# Patient Record
Sex: FEMALE | Race: WHITE | NOT HISPANIC OR LATINO | Employment: OTHER | URBAN - METROPOLITAN AREA
[De-identification: names, ages, dates, MRNs, and addresses within clinical notes are randomized per-mention and may not be internally consistent; named-entity substitution may affect disease eponyms.]

---

## 2017-01-02 ENCOUNTER — APPOINTMENT (OUTPATIENT)
Dept: ULTRASOUND IMAGING | Facility: CLINIC | Age: 52
DRG: 407 | End: 2017-01-02
Attending: TRANSPLANT SURGERY
Payer: COMMERCIAL

## 2017-01-02 ENCOUNTER — APPOINTMENT (OUTPATIENT)
Dept: GENERAL RADIOLOGY | Facility: CLINIC | Age: 52
DRG: 407 | End: 2017-01-02
Attending: TRANSPLANT SURGERY
Payer: COMMERCIAL

## 2017-01-02 ENCOUNTER — APPOINTMENT (OUTPATIENT)
Dept: PHYSICAL THERAPY | Facility: CLINIC | Age: 52
DRG: 407 | End: 2017-01-02
Attending: SURGERY
Payer: COMMERCIAL

## 2017-01-02 PROCEDURE — 40000940 XR ABDOMEN 1 VW

## 2017-01-02 PROCEDURE — 76700 US EXAM ABDOM COMPLETE: CPT

## 2017-01-03 ENCOUNTER — APPOINTMENT (OUTPATIENT)
Dept: PHYSICAL THERAPY | Facility: CLINIC | Age: 52
DRG: 407 | End: 2017-01-03
Attending: SURGERY
Payer: COMMERCIAL

## 2017-01-05 ENCOUNTER — APPOINTMENT (OUTPATIENT)
Dept: PHYSICAL THERAPY | Facility: CLINIC | Age: 52
DRG: 407 | End: 2017-01-05
Attending: SURGERY
Payer: COMMERCIAL

## 2017-01-06 ENCOUNTER — TELEPHONE (OUTPATIENT)
Dept: TRANSPLANT | Facility: CLINIC | Age: 52
End: 2017-01-06

## 2017-01-06 NOTE — TELEPHONE ENCOUNTER
Went to visit Dinora on 7A to review discharge teaching and planning for when she is discharged from acute care. Dinora was c/o feeling groggy and not up to teaching at this time. She requested to wait until her  was present. Will wait for their call when Dinora is feeling better.

## 2017-01-09 ENCOUNTER — APPOINTMENT (OUTPATIENT)
Dept: LAB | Facility: CLINIC | Age: 52
DRG: 407 | End: 2017-01-09
Attending: INTERNAL MEDICINE
Payer: COMMERCIAL

## 2017-01-09 ENCOUNTER — TELEPHONE (OUTPATIENT)
Dept: PHARMACY | Facility: CLINIC | Age: 52
End: 2017-01-09

## 2017-01-09 ENCOUNTER — CARE COORDINATION (OUTPATIENT)
Dept: CARE COORDINATION | Facility: CLINIC | Age: 52
End: 2017-01-09

## 2017-01-09 ENCOUNTER — TELEPHONE (OUTPATIENT)
Dept: TRANSPLANT | Facility: CLINIC | Age: 52
End: 2017-01-09

## 2017-01-09 ENCOUNTER — INFUSION THERAPY VISIT (OUTPATIENT)
Dept: INFUSION THERAPY | Facility: CLINIC | Age: 52
DRG: 407 | End: 2017-01-09
Attending: SURGERY
Payer: COMMERCIAL

## 2017-01-09 VITALS
OXYGEN SATURATION: 100 % | WEIGHT: 143.6 LBS | BODY MASS INDEX: 21.84 KG/M2 | TEMPERATURE: 96.7 F | SYSTOLIC BLOOD PRESSURE: 114 MMHG | DIASTOLIC BLOOD PRESSURE: 63 MMHG

## 2017-01-09 DIAGNOSIS — Z90.410 POST-PANCREATECTOMY DIABETES (H): Primary | ICD-10-CM

## 2017-01-09 DIAGNOSIS — E13.9 POST-PANCREATECTOMY DIABETES (H): Primary | ICD-10-CM

## 2017-01-09 DIAGNOSIS — Z90.410 ACQUIRED TOTAL ABSENCE OF PANCREAS: Primary | ICD-10-CM

## 2017-01-09 DIAGNOSIS — K86.89 PANCREATIC INSUFFICIENCY: ICD-10-CM

## 2017-01-09 DIAGNOSIS — Z90.410 HISTORY OF PANCREATECTOMY: ICD-10-CM

## 2017-01-09 DIAGNOSIS — Z90.81 ACQUIRED ASPLENIA: ICD-10-CM

## 2017-01-09 DIAGNOSIS — E89.1 POST-PANCREATECTOMY DIABETES (H): Primary | ICD-10-CM

## 2017-01-09 DIAGNOSIS — E89.1 POST-PANCREATECTOMY DIABETES (H): ICD-10-CM

## 2017-01-09 DIAGNOSIS — Z48.298 AFTERCARE FOLLOWING ORGAN TRANSPLANT: Primary | ICD-10-CM

## 2017-01-09 DIAGNOSIS — R79.89 ELEVATED PLATELET COUNT: Primary | ICD-10-CM

## 2017-01-09 DIAGNOSIS — E13.9 POST-PANCREATECTOMY DIABETES (H): ICD-10-CM

## 2017-01-09 DIAGNOSIS — Z90.410 POST-PANCREATECTOMY DIABETES (H): ICD-10-CM

## 2017-01-09 LAB
AMYLASE SERPL-CCNC: 28 U/L (ref 30–110)
ANION GAP SERPL CALCULATED.3IONS-SCNC: 7 MMOL/L (ref 3–14)
BASOPHILS # BLD AUTO: 0.1 10E9/L (ref 0–0.2)
BASOPHILS NFR BLD AUTO: 0.9 %
BUN SERPL-MCNC: 26 MG/DL (ref 7–30)
CALCIUM SERPL-MCNC: 9.2 MG/DL (ref 8.5–10.1)
CHLORIDE SERPL-SCNC: 97 MMOL/L (ref 94–109)
CO2 SERPL-SCNC: 33 MMOL/L (ref 20–32)
CREAT SERPL-MCNC: 0.48 MG/DL (ref 0.52–1.04)
DIFFERENTIAL METHOD BLD: ABNORMAL
EOSINOPHIL # BLD AUTO: 0.9 10E9/L (ref 0–0.7)
EOSINOPHIL NFR BLD AUTO: 6.3 %
ERYTHROCYTE [DISTWIDTH] IN BLOOD BY AUTOMATED COUNT: 13.7 % (ref 10–15)
GFR SERPL CREATININE-BSD FRML MDRD: ABNORMAL ML/MIN/1.7M2
GLUCOSE BLDC GLUCOMTR-MCNC: 110 MG/DL (ref 70–99)
GLUCOSE SERPL-MCNC: 144 MG/DL (ref 70–99)
HCT VFR BLD AUTO: 35.8 % (ref 35–47)
HGB BLD-MCNC: 11.3 G/DL (ref 11.7–15.7)
IMM GRANULOCYTES # BLD: 0.1 10E9/L (ref 0–0.4)
IMM GRANULOCYTES NFR BLD: 0.5 %
LIPASE SERPL-CCNC: 36 U/L (ref 73–393)
LYMPHOCYTES # BLD AUTO: 1.9 10E9/L (ref 0.8–5.3)
LYMPHOCYTES NFR BLD AUTO: 13.3 %
MAGNESIUM SERPL-MCNC: 2.8 MG/DL (ref 1.6–2.3)
MCH RBC QN AUTO: 28.9 PG (ref 26.5–33)
MCHC RBC AUTO-ENTMCNC: 31.6 G/DL (ref 31.5–36.5)
MCV RBC AUTO: 92 FL (ref 78–100)
MONOCYTES # BLD AUTO: 1.3 10E9/L (ref 0–1.3)
MONOCYTES NFR BLD AUTO: 8.7 %
NEUTROPHILS # BLD AUTO: 10.1 10E9/L (ref 1.6–8.3)
NEUTROPHILS NFR BLD AUTO: 70.3 %
NRBC # BLD AUTO: 0.1 10*3/UL
NRBC BLD AUTO-RTO: 0 /100
PHOSPHATE SERPL-MCNC: 4.6 MG/DL (ref 2.5–4.5)
PLATELET # BLD AUTO: 1420 10E9/L (ref 150–450)
POTASSIUM SERPL-SCNC: 4.3 MMOL/L (ref 3.4–5.3)
RBC # BLD AUTO: 3.91 10E12/L (ref 3.8–5.2)
SODIUM SERPL-SCNC: 137 MMOL/L (ref 133–144)
WBC # BLD AUTO: 14.3 10E9/L (ref 4–11)

## 2017-01-09 PROCEDURE — 80048 BASIC METABOLIC PNL TOTAL CA: CPT | Performed by: SURGERY

## 2017-01-09 PROCEDURE — 83690 ASSAY OF LIPASE: CPT | Performed by: SURGERY

## 2017-01-09 PROCEDURE — 00000146 ZZHCL STATISTIC GLUCOSE BY METER IP

## 2017-01-09 PROCEDURE — 82150 ASSAY OF AMYLASE: CPT | Performed by: SURGERY

## 2017-01-09 PROCEDURE — 96360 HYDRATION IV INFUSION INIT: CPT | Mod: ZF

## 2017-01-09 PROCEDURE — 99215 OFFICE O/P EST HI 40 MIN: CPT | Mod: 25,ZF

## 2017-01-09 PROCEDURE — 85025 COMPLETE CBC W/AUTO DIFF WBC: CPT | Performed by: SURGERY

## 2017-01-09 PROCEDURE — 96361 HYDRATE IV INFUSION ADD-ON: CPT | Mod: ZF

## 2017-01-09 PROCEDURE — 25000125 ZZHC RX 250: Mod: ZF | Performed by: SURGERY

## 2017-01-09 PROCEDURE — 84100 ASSAY OF PHOSPHORUS: CPT | Performed by: SURGERY

## 2017-01-09 PROCEDURE — 25800025 ZZH RX 258: Mod: ZF | Performed by: SURGERY

## 2017-01-09 PROCEDURE — 25000132 ZZH RX MED GY IP 250 OP 250 PS 637: Mod: ZF | Performed by: SURGERY

## 2017-01-09 PROCEDURE — 83735 ASSAY OF MAGNESIUM: CPT | Performed by: SURGERY

## 2017-01-09 RX ORDER — ASPIRIN 325 MG
325 TABLET ORAL ONCE
Status: COMPLETED | OUTPATIENT
Start: 2017-01-09 | End: 2017-01-09

## 2017-01-09 RX ORDER — HEPARIN SODIUM (PORCINE) LOCK FLUSH IV SOLN 100 UNIT/ML 100 UNIT/ML
5 SOLUTION INTRAVENOUS DAILY PRN
Status: DISCONTINUED | OUTPATIENT
Start: 2017-01-09 | End: 2017-01-09 | Stop reason: HOSPADM

## 2017-01-09 RX ORDER — ASPIRIN 325 MG
325 TABLET ORAL DAILY
Qty: 180 TABLET | Refills: 0 | Status: SHIPPED
Start: 2017-01-09 | End: 2017-01-31 | Stop reason: DRUGHIGH

## 2017-01-09 RX ADMIN — SODIUM CHLORIDE, POTASSIUM CHLORIDE, SODIUM LACTATE AND CALCIUM CHLORIDE 2000 ML: 600; 310; 30; 20 INJECTION, SOLUTION INTRAVENOUS at 12:39

## 2017-01-09 RX ADMIN — SODIUM CHLORIDE, PRESERVATIVE FREE 5 ML: 5 INJECTION INTRAVENOUS at 11:11

## 2017-01-09 RX ADMIN — ASPIRIN 325 MG ORAL TABLET 325 MG: 325 PILL ORAL at 12:10

## 2017-01-09 NOTE — Clinical Note
January 9, 2017      TO: Dinora Mcghee  [address] [facility] [id number]         Dear Ms. Dinora Mcghee,    It was a pleasure speaking with you recently regarding your genetic testing results. You were previously referred for genetic counseling and testing by Dr. Phoenix due to your history of pancreatitis.  We discussed that your genetic testing results were negative, meaning that no mutations or unknown variants were found in the genes examined.     As we reviewed, you had genetic testing for four genes associated with chronic pancreatitis or recurrent acute pancreatitis: the cationic trypsinogen gene (PRSS1), the pancreatic secretory trypsin inhibitor gene (SPINK1), chymotrypcin C (CTRC), and the gene for cystic fibrosis (CFTR). This test is expected to detect 99% of mutations in the PRSS1, SPINK1, and CTRC gene, and 97-98% in the CFTR gene. Since no mutations or variants were detected, this significantly reduces the liklihood that your symptoms are related to mutations in any of these genes. However, while these results reduce the chance that there is a genetic component, they do not eliminate the possibility entirely as we are still learning about the genetics of pancreatitis.  It would still be important for other relatives to be aware of the family history of pancreatitis. We may have better or additional genetic testing for pancreatitis in the future.     If you have any additional questions about these results, please don't hesitate to contact me at [phone]. Best wishes to you.     Sincerely,     April Clifford MS [facility]  Genetic Counselor  [facility]    CC: Alban BURLESON, Ty

## 2017-01-09 NOTE — PROGRESS NOTES
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      Discharge date: 1/8  Transplant coordinator: Mecca   Phone number patient can be reached at: [phone] (M)      Physical Assessment:  See physical assessment located under \"Document Flowsheets\".  Incision site: c/d/i steri-strips. No s/s infection.   Lines: G/J tube. G tube with ~650-700 mls out put since discharge from hospital.   Piedra: N/A  Urine clarity: clear/yellow, reports no issues with urination.   Hydration: 30 ml/hr free water increased to 40/ml per Dr. Phoenix.   Nutrition: TF 60 ml/hr   Last BM: Diarrhea, 3 x since yesterday. Bowel meds adjusted through coordinator, per orders from Alban.  Pain: Upper RQ abd pain, and incisional pain.      Laboratory tests: Standard labs drawn.    Plan of care for today: Labs drawn and pt assessed. Post education checklist reviewed with pt and family present; spouse, and mother. Coordinator Mecca at bedside and in communication with MD. Orders received for 2 L LR bolus. Pt medication card reviewed, specialty pharmacist present and assisting with extensive medication review. Per request, BG was checked with pt's meter and with SIPC meter; pt's meter resulting 93 and SIPC meter resulting 110. Diabetic educators unavailable. Aspirin administered per orders, script sent to pharmacy per Mecca. Port flushed with heparin and deaccessed, pt to follow up thurs with Dr. Phoenix.         Medications administered:  Administrations This Visit     aspirin tablet 325 mg     Admin Date Action Dose Route Administered By             01/09/2017 Given 325 mg Oral Paty Polanco, MONICA                    heparin 100 UNIT/ML injection 5 mL     Admin Date Action Dose Route Administered By             01/09/2017 Given 5 mL Intracatheter Sonja Mcgrath RN                    lactated ringers BOLUS 2,000 mL     Admin Date Action Dose Route Administered By             01/09/2017 New Bag 2000 mL " Intravenous Paty Polanco, MONICA                    sodium chloride (PF) 0.9% PF flush 20 mL     Admin Date Action Dose Route Administered By             01/09/2017 Given 20 mL Intracatheter Sonja Mcgrath RN                          Patient education:    The following teaching topics were addressed: Medications (purposes, doses and times of administration), Phone numbers to call with concenrs (Transplant coordinator, [facility] and [facility]) and Plan of care   Patient, Mother and spouse3. verbalized understanding and all questions answered.      Discharge Plan    Pt will follow up with appt thurs with Dr. Phoenix  Discharge instructions reviewed with patient: YES  Patient/Representative verbalized understanding, all questions answered: YES    Discharged from unit at 1500 with whom: family to home.    Paty Polanco RN

## 2017-01-09 NOTE — TELEPHONE ENCOUNTER
Called to notify patient of her pancreatitis genetic testing results, which were negative for mutations or variants in all four of the tested genes (CFTR, SPINK1, CTRC, PRSS1). No answer, so left message asking that she call me back.     April Clifford MS, [facility]  Genetic Counselor  [facility]

## 2017-01-09 NOTE — TELEPHONE ENCOUNTER
Received call back from patient and shared test results. Answered questions and she expressed understanding. Will send her a copy of the results and results letter for her records.    April Clifford MS, [facility]  Genetic Counselor  [facility]

## 2017-01-09 NOTE — TELEPHONE ENCOUNTER
"Reviewed labs for Dinora with Dr. Phoenix. Bicarb was 33. Dinora c/o 3 bouts of brown watery diarrhea. Dinora is c/o feeling \"wiped out\". She says she slept well. Decision was to discontinue the mineral oil, and MOM 30 mg, and senna/colace 1 time per day. Also to give her 2 liters of LR. Kye right chest port has been accessed so this was used for fluid therapy. Increase free water to 40 cc/hour along with her tube feeds at 60 cc/hour. There was 650-700 cc in the gastric bag.  Mom took the first night caring for Dinora. Mom (Tegan), is asking for a way to document who gave what medication at what time to avoid a potential missed dose or give too much medication. This RN provided several options and printed multiple medication cards. We came up with a solution that they will try. Mother was sharing that the Brand name is written in very small letters and is hard to see. [facility] Pharmacist, Raeann was in the room doing patient teaching and sorting out Dinora's medications.    Family verbalized understanding of medication teaching. Dinora has been concerned that her glucometer isn't working correctly and that she had many highs during the night. We did a comparison blood sugar check with Dinora's meter and the meter from [facility]. Dinora's meter 98, [facility] glucometer 110. Dr. Melissa notified of Dinora's blood sugars since she has been home. Dinora, Shaan, and mother-Tegan all verbalized understanding and felt comfortable with the plan and managing her care at home. No additional questions.  "

## 2017-01-09 NOTE — NURSING NOTE
Chief Complaint   Patient presents with     Port Draw     port accessed by RN, labs collected and sent, line flushed with NS and heparin and left accessed just in case. Only vitals done was pt weight.     Sonja Mcgrath

## 2017-01-09 NOTE — TELEPHONE ENCOUNTER
Dinora Mcghee is a 51 year old TP-IAT patient. This pharmacist saw patient prior to discharge to review medications, perform medication education and offer Specialty Pharmacy program.  Current Outpatient Prescriptions   Medication Sig Dispense Refill     aspirin 325 MG tablet Take 1 tablet (325 mg) by mouth daily Take 1 aspirin, 325 mg. Crush the aspirin and administer via jejunal tube. Flush with 30 cc of free water. 180 tablet 0     mineral oil oil 30 mLs by Per Feeding Tube route daily 420 mL 2     insulin aspart (NOVOLOG PEN) 100 UNIT/ML injection Inject 1-6 Units Subcutaneous every 4 hours 15 mL 1     insulin glargine (LANTUS) 100 UNIT/ML injection Inject 13 Units Subcutaneous every 24 hours 15 mL 1     HYDROmorphone (DILAUDID) 1 MG/ML LIQD liquid 3 mLs (3 mg) by Per Feeding Tube route every 3 hours 336 mL 0     LORazepam (ATIVAN) 2 MG/ML (HIGH CONC) solution Take 0.25 mLs (0.5 mg) by mouth every 6 hours as needed for anxiety, nausea or vomiting 14 mL 1     sodium bicarbonate 325 MG tablet 1 tablet (325 mg) by Per J Tube route every 4 hours 84 tablet 2     amylase-lipase-protease (CREON 24) 06826 UNITS CPEP per EC capsule 1-2 capsules (24,000-48,000 Units) by Per J Tube route every 4 hours 168 capsule 2     pregabalin (LYRICA) 20 MG/ML solution 2.5 mLs (50 mg) by Per J Tube route 2 times daily 70 mL 1     amitriptyline (ELAVIL) 2 MG/ML solution 10 mLs (20 mg) by Per J Tube route At Bedtime 14 mL 2     glucose 40 % GEL gel Take 15-30 g by mouth every 15 minutes as needed for low blood sugar 3 Tube 2     promethazine (PHENERGAN) 6.25 MG/5ML syrup 10 mLs (12.5 mg) by Per Feeding Tube route every 4 hours as needed for nausea 560 mL 2     prochlorperazine (COMPAZINE) 5 MG tablet 1 tablet (5 mg) by Per J Tube route every 6 hours 56 tablet 2     docusate (COLACE) 50 MG/5ML liquid 10 mLs (100 mg) by Per J Tube route 2 times daily 280 mL 2     magnesium hydroxide (MILK OF MAGNESIA) 400 MG/5ML suspension 30 mLs by  Per J Tube route 2 times daily 840 mL 2     sennosides (SENOKOT) 8.8 MG/5ML syrup 10 mLs by Per Feeding Tube route 2 times daily 280 mL 2     multivitamins with minerals (CERTAVITE/CEROVITE) LIQD liquid 15 mLs by Per Feeding Tube route daily (with lunch) 210 mL 2     levothyroxine (SYNTHROID) 25 mcg/mL 5 mLs (125 mcg) by Per J Tube route every morning (before breakfast) 70 mL 2     pantoprazole (PROTONIX) 2 mg/mL suspension 20 mLs (40 mg) by Per Feeding Tube route daily 280 mL 2     Sharps Container (BD SHARPS ) MISC 1 Container as needed 1 each 1     insulin pen needle (BD KEN U/F) 32G X 4 MM Use 4-6 times per day 100 each prn     Alcohol Swabs PADS 1 pad as needed 100 each 3     blood glucose monitoring (ACCU-CHEK MULTICLIX) lancets Use up to 6 times daily 1 Box 3     blood glucose monitoring (ACCU-CHEK EMMY PLUS) meter device kit 1 kit by In Vitro route every 4 hours Use to test blood sugar 6 times daily or as directed.    Or whatever is covered 1 kit 1     blood glucose monitoring (ACCU-CHEK EMMY) test strip Use to test blood sugar 6 times daily or as directed. 200 strip prn     blood glucose monitoring (PAT CONTOUR NEXT) test strip Use to test blood sugar 6-8 times daily or as directed. 200 strip prn     blood glucose monitoring (PAT MICROLET) lancets Use to test blood sugar 6-8 times daily or as directed. 1 Box prn     Date of Transplant:2016  Medication education/ DUR performed, including medication uses, doses and side effects? Yes  How are you feeling today? Tired, thirsty/dehydrated, lots of diarrhea  Are you experiencing any side effects? Yes  Are you experiencing any pain? Yes Pain ratin  Which pain medications are you taking? Hydromorphone Is it working? Yes  Are you taking any other medications (including herbals, vitamins, and OTC products)? No  Taking oral medications? No  Liquids? Yes Solids? No  Glucose monitor name: Contour Next  How often are you checking your blood  glucose? Q4H  What's your blood glucose reading? Low 160's    Medication Adherence Plan  Setup: pillbox  Reminder: smart phone , Mom and  are helping around the clock  Dosing: medication card  Assistance: {Assistance: [id number]) Mom (Tegan) and Norris ()    Patient enrolled in Specialty Pharmacy Program? Yes  Pill  given: Yes    Comments: I visited Tegan Farias (mom), and Norris () in [facility]. Mecca (coordinator) was frequently in the room and part of the conversations also. I demonstrated how to draw up doses with an oral syringe. I informed them how to store each medication, and the insulin expiration dates. (Only one med was being stored incorrectly.) At their request I gave them many more blue caps and oral syringes. I added the brand name to their medication labels. (That was confusing to them.) I verified they have a supply of all medications. I printed out how to dose the sodium bicarbonate and enzymes. I gave information that are specific to when she's taking meds orally. They inquired if some of her tablets and capsules come in a liquid form. I got verbal orders from Mecca for Levothyroxine and Lyrica in liquid form. They seemed pretty comfortable with the medications. I recommended [email or web address] to help chart that doses have been given. They inquired about specific meds being taken together, and how to dose the Aspirin. I reviewed how to reorder medications from [facility]. I informed them of the various pharmacies on and near campus.    Raeann Rowell [facility]  [phone]

## 2017-01-09 NOTE — PROGRESS NOTES
Patient is a transplant patient and will be followed by the transplant team for follow up      Islets (Transplant 1)   Last reviewed by Cora Richardson on 12/29/2016    Phase: Transplanted (12/28/2016)   Status: Active Follow-up    Next review:     POD: 12 days                           Protocols: Auto Islet         Native organs dx:               Phase history: Effective Phase Status Reason Report   12/28/2016 Transplanted Active Follow-up     11/30/2016 Evaluation Active Ready for Committee    12/2/2016 Referral Active                                           Events      Referred: 8/15/2016 Evaluated: 11/30/2016 Committee: 11/30/2016 UNOS qualified:      Center waitlisted:  Admitted:  Transplanted: 12/28/2016

## 2017-01-10 ENCOUNTER — TELEPHONE (OUTPATIENT)
Dept: TRANSPLANT | Facility: CLINIC | Age: 52
End: 2017-01-10

## 2017-01-10 ENCOUNTER — MYC MEDICAL ADVICE (OUTPATIENT)
Dept: TRANSPLANT | Facility: CLINIC | Age: 52
End: 2017-01-10

## 2017-01-10 DIAGNOSIS — Z90.410 HISTORY OF PANCREATECTOMY: Primary | ICD-10-CM

## 2017-01-10 NOTE — TELEPHONE ENCOUNTER
Dinora's blood sugar results for 1/9/a dn early 1/10/17.    Bs from1/9/17 4:00pm 130 , gave 1 unit N; 8:15pm 115 no correction.     1/10/17 12:30am 143, gave 1 unit N; 3:40am 78 called Dr Melissa. Instructed to wait 20 min then retest, if it's over 80 no further action needed. 4:20am 84, no correction.; 8:10am 160 gave 1 unit N.       Please email or call with questions. Thank you!     Dinora Loo   [phone]

## 2017-01-10 NOTE — TELEPHONE ENCOUNTER
Spoke with Dinora. She reports feeling good today. Pain is a 3/10. During the 4:00 AM feeding time, Dinora's mother prepared and hung the feeding bag but had forgotten to open the valve on the free water and formula. Dinora woke up 90 minutes later to the alarm sounding. She checked her blood sugar and it was 78. She called Dr. Melissa who told her to check her blood sugar in 15 minutes and if lower to put some juice down her jejunal port. Her blood sugar had not gone down so she did not administer any additional insulin. Dinora's blood sugar at 8:00 AM today was 160. She began her new Lantus dose of 16 units and also gave herself 1 unit of Novolog.  This RN requested Dinora to e-mail her daily blood sugars to me after she checks her 8 AM blood sugar and to talk with me prior to giving her morning Lantus to make sure no changes need to be made. Dinora verbalized understanding.

## 2017-01-11 ENCOUNTER — RESULTS ONLY (OUTPATIENT)
Dept: LAB | Age: 52
End: 2017-01-11

## 2017-01-11 ENCOUNTER — PRE VISIT (OUTPATIENT)
Dept: GASTROENTEROLOGY | Facility: CLINIC | Age: 52
End: 2017-01-11

## 2017-01-11 ENCOUNTER — TELEPHONE (OUTPATIENT)
Dept: TRANSPLANT | Facility: CLINIC | Age: 52
End: 2017-01-11

## 2017-01-11 DIAGNOSIS — E89.1 POST-PANCREATECTOMY DIABETES (H): Primary | ICD-10-CM

## 2017-01-11 DIAGNOSIS — Z90.410 POST-PANCREATECTOMY DIABETES (H): Primary | ICD-10-CM

## 2017-01-11 DIAGNOSIS — E13.9 POST-PANCREATECTOMY DIABETES (H): Primary | ICD-10-CM

## 2017-01-11 LAB
ALBUMIN SERPL-MCNC: 3.5 G/DL (ref 3.4–5)
ALP SERPL-CCNC: 321 U/L (ref 40–150)
ALT SERPL W P-5'-P-CCNC: 45 U/L (ref 0–50)
ANION GAP SERPL CALCULATED.3IONS-SCNC: 8 MMOL/L (ref 3–14)
AST SERPL W P-5'-P-CCNC: 32 U/L (ref 0–45)
BASOPHILS # BLD AUTO: 0.1 10E9/L (ref 0–0.2)
BASOPHILS NFR BLD AUTO: 1 %
BILIRUB SERPL-MCNC: 0.3 MG/DL (ref 0.2–1.3)
BUN SERPL-MCNC: 27 MG/DL (ref 7–30)
CALCIUM SERPL-MCNC: 9.1 MG/DL (ref 8.5–10.1)
CHLORIDE SERPL-SCNC: 99 MMOL/L (ref 94–109)
CO2 SERPL-SCNC: 33 MMOL/L (ref 20–32)
CREAT SERPL-MCNC: 0.59 MG/DL (ref 0.52–1.04)
D DIMER PPP FEU-MCNC: 3.4 UG/ML FEU (ref 0–0.5)
DIFFERENTIAL METHOD BLD: ABNORMAL
EOSINOPHIL # BLD AUTO: 1.2 10E9/L (ref 0–0.7)
EOSINOPHIL NFR BLD AUTO: 10.8 %
ERYTHROCYTE [DISTWIDTH] IN BLOOD BY AUTOMATED COUNT: 13.8 % (ref 10–15)
GFR SERPL CREATININE-BSD FRML MDRD: ABNORMAL ML/MIN/1.7M2
GLUCOSE SERPL-MCNC: 102 MG/DL (ref 70–99)
HCT VFR BLD AUTO: 36.3 % (ref 35–47)
HGB BLD-MCNC: 11.1 G/DL (ref 11.7–15.7)
IMM GRANULOCYTES # BLD: 0 10E9/L (ref 0–0.4)
IMM GRANULOCYTES NFR BLD: 0.3 %
LYMPHOCYTES # BLD AUTO: 2.1 10E9/L (ref 0.8–5.3)
LYMPHOCYTES NFR BLD AUTO: 18.5 %
MCH RBC QN AUTO: 28.5 PG (ref 26.5–33)
MCHC RBC AUTO-ENTMCNC: 30.6 G/DL (ref 31.5–36.5)
MCV RBC AUTO: 93 FL (ref 78–100)
MONOCYTES # BLD AUTO: 1.1 10E9/L (ref 0–1.3)
MONOCYTES NFR BLD AUTO: 10 %
NEUTROPHILS # BLD AUTO: 6.8 10E9/L (ref 1.6–8.3)
NEUTROPHILS NFR BLD AUTO: 59.4 %
NRBC # BLD AUTO: 0.1 10*3/UL
NRBC BLD AUTO-RTO: 1 /100
PLATELET # BLD AUTO: 1394 10E9/L (ref 150–450)
POTASSIUM SERPL-SCNC: 3.8 MMOL/L (ref 3.4–5.3)
PROT SERPL-MCNC: 7.6 G/DL (ref 6.8–8.8)
RBC # BLD AUTO: 3.89 10E12/L (ref 3.8–5.2)
SODIUM SERPL-SCNC: 140 MMOL/L (ref 133–144)
WBC # BLD AUTO: 11.4 10E9/L (ref 4–11)

## 2017-01-11 NOTE — TELEPHONE ENCOUNTER
Spoke with Dinora and asked her to increase her daily Lantus to 17 units per Dr. Melissa. Dinora verbalized understanding. Dinora reports feeling tired but also hungry for the first time in many years. She began to experience hunger 4 days ago. Continues to have loose stools. Dinora held her bowel regimen med's today due to the travel from [place] to the [place] for appointments. Will take them when she returns home today. Dinora has been stretching out the time between her pain medications. She was able to go 4 hour and 5 hours between doses 1/10/17. She is also requiring less of her nausea medication but did take her compazine/Phenergan with her vitamins as suggested carissa to difficulty tolerating her MVI. She said this helped eliminate the nausea quite well.She reports taking an Ativan to tolerate the long drive here today. She reports 300-400 cc out of her g-tube but was unsure as was in the car and needed to verify exact output.

## 2017-01-12 ENCOUNTER — INFUSION THERAPY VISIT (OUTPATIENT)
Dept: INFUSION THERAPY | Facility: CLINIC | Age: 52
End: 2017-01-12
Attending: SURGERY
Payer: COMMERCIAL

## 2017-01-12 ENCOUNTER — OFFICE VISIT (OUTPATIENT)
Dept: TRANSPLANT | Facility: CLINIC | Age: 52
End: 2017-01-12
Attending: SURGERY
Payer: COMMERCIAL

## 2017-01-12 ENCOUNTER — TELEPHONE (OUTPATIENT)
Dept: TRANSPLANT | Facility: CLINIC | Age: 52
End: 2017-01-12

## 2017-01-12 VITALS
SYSTOLIC BLOOD PRESSURE: 109 MMHG | TEMPERATURE: 98.4 F | HEART RATE: 79 BPM | OXYGEN SATURATION: 100 % | DIASTOLIC BLOOD PRESSURE: 70 MMHG | RESPIRATION RATE: 16 BRPM

## 2017-01-12 VITALS
RESPIRATION RATE: 16 BRPM | OXYGEN SATURATION: 100 % | TEMPERATURE: 98.4 F | DIASTOLIC BLOOD PRESSURE: 76 MMHG | SYSTOLIC BLOOD PRESSURE: 116 MMHG | HEART RATE: 96 BPM

## 2017-01-12 DIAGNOSIS — Z90.410 POST-PANCREATECTOMY DIABETES (H): Primary | ICD-10-CM

## 2017-01-12 DIAGNOSIS — E89.1 POST-PANCREATECTOMY DIABETES (H): Primary | ICD-10-CM

## 2017-01-12 DIAGNOSIS — K86.81 EXOCRINE PANCREATIC INSUFFICIENCY: Primary | ICD-10-CM

## 2017-01-12 DIAGNOSIS — E86.0 DEHYDRATION: Primary | ICD-10-CM

## 2017-01-12 DIAGNOSIS — G89.18 ACUTE POST-OPERATIVE PAIN: ICD-10-CM

## 2017-01-12 DIAGNOSIS — G89.29 CHRONIC ABDOMINAL PAIN: ICD-10-CM

## 2017-01-12 DIAGNOSIS — Z90.410 ACQUIRED TOTAL ABSENCE OF PANCREAS: ICD-10-CM

## 2017-01-12 DIAGNOSIS — R10.9 CHRONIC ABDOMINAL PAIN: ICD-10-CM

## 2017-01-12 DIAGNOSIS — R79.89 ELEVATED PLATELET COUNT: ICD-10-CM

## 2017-01-12 DIAGNOSIS — E13.9 POST-PANCREATECTOMY DIABETES (H): Primary | ICD-10-CM

## 2017-01-12 LAB
A1AT SERPL-MCNC: 248 MG/DL (ref 80–200)
ALBUMIN UR-MCNC: NEGATIVE MG/DL
AMORPH CRY #/AREA URNS HPF: ABNORMAL /HPF
APPEARANCE UR: ABNORMAL
BACTERIA #/AREA URNS HPF: ABNORMAL /HPF
BILIRUB UR QL STRIP: NEGATIVE
COLOR UR AUTO: YELLOW
GLUCOSE UR STRIP-MCNC: NEGATIVE MG/DL
HGB UR QL STRIP: NEGATIVE
KETONES UR STRIP-MCNC: NEGATIVE MG/DL
LEUKOCYTE ESTERASE UR QL STRIP: ABNORMAL
MUCOUS THREADS #/AREA URNS LPF: PRESENT /LPF
NITRATE UR QL: NEGATIVE
PH UR STRIP: 7 PH (ref 5–7)
RBC #/AREA URNS AUTO: 1 /HPF (ref 0–2)
SP GR UR STRIP: 1.02 (ref 1–1.03)
SQUAMOUS #/AREA URNS AUTO: 4 /HPF (ref 0–1)
URN SPEC COLLECT METH UR: ABNORMAL
UROBILINOGEN UR STRIP-MCNC: 0 MG/DL (ref 0–2)
WBC #/AREA URNS AUTO: 5 /HPF (ref 0–2)

## 2017-01-12 PROCEDURE — 96361 HYDRATE IV INFUSION ADD-ON: CPT | Mod: ZF

## 2017-01-12 PROCEDURE — 25800025 ZZH RX 258: Mod: ZF | Performed by: SURGERY

## 2017-01-12 PROCEDURE — 96360 HYDRATION IV INFUSION INIT: CPT | Mod: ZF

## 2017-01-12 PROCEDURE — 81001 URINALYSIS AUTO W/SCOPE: CPT | Performed by: SURGERY

## 2017-01-12 RX ADMIN — SODIUM CHLORIDE, POTASSIUM CHLORIDE, SODIUM LACTATE AND CALCIUM CHLORIDE 2000 ML: 600; 310; 30; 20 INJECTION, SOLUTION INTRAVENOUS at 15:15

## 2017-01-12 NOTE — PROGRESS NOTES
Chronic Pancreatitis Group Progress Note    I had the pleasure of seeing Ms. Dinora Mcghee today. 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   Interval events since last visit with me:   ER visits = 0, reasons: n/a  Inpatient admissions = 0, reasons n/a  The patient reports their current symptoms to be:   GI function:   Nausea:   []  none    [x]  rare    []  often    []  daily  Comment: compazine prn 1-2 x day  Vomiting: [x]  none    []  rare    []  often    []  daily   Comment: GT not clamped.  /day - thin/clear/tf flecked  Last BM: Today.  Stools are liquid, frequency : TID.   Appetite: good - HUNGRY! But still NPO x ice chips  Tube feeds at 60 cc/hr, plus 40 cc/hr H20,  24 hr cycle.  Pancreatic exocrine insufficiency:   Takes (Creon 24), 2 with 4 hr TF cycle  Greasy stools: [x]  none   []  rarely    []  several times/wk   []  daily      Comment:   Diabetes management:   Long acting insulin: Lantus--20 units/day  Short acting insulin: Novolog--3-6 units/day (so increased lantus today from 17 to 20)  Blood sugars typically range from 128 to 150.   A1C      4.5   12/19/2016  A1C      4.5   11/29/2016   Pain management:   Pain rating (0=no pain, 10=unbearable): 6  Long acting agent: none   Short acting agent: dilaudid 3 mg every 3 hr prn, about 5 doses per day. Pain worse at night, but ativan and tylenol helps.  Has not had ibuprofen.  Lyrica 50 BID  Daily 'Uptime':  Napping and some dozing periods.  Walking: distance not regular yet.    Prescription Medications as of 1/12/2017             insulin glargine (LANTUS) 100 UNIT/ML injection Inject 20 units subcutaneously every day at 8:00 AM.    study - etanercept (IDS# [id number]) 25 MG/0.5ML subcutaneous injection Inject 25 mg Subcutaneous once for 1 dose    aspirin 325 MG tablet Take 1 tablet (325 mg) by mouth daily Take 1 aspirin, 325 mg. Crush the aspirin and administer via jejunal tube. Flush with 30 cc of free water.    magnesium  hydroxide (MILK OF MAGNESIA) 400 MG/5ML suspension 30 mLs by Per J Tube route daily    sennosides (SENOKOT) 8.8 MG/5ML syrup 10 mLs by Per Feeding Tube route At Bedtime    pantoprazole (PROTONIX) 2 mg/mL suspension 20 mLs (40 mg) by Per Feeding Tube route 2 times daily    insulin aspart (NOVOLOG PEN) 100 UNIT/ML injection Inject 1-6 Units Subcutaneous every 4 hours    HYDROmorphone (DILAUDID) 1 MG/ML LIQD liquid 3 mLs (3 mg) by Per Feeding Tube route every 3 hours    LORazepam (ATIVAN) 2 MG/ML (HIGH CONC) solution Take 0.25 mLs (0.5 mg) by mouth every 6 hours as needed for anxiety, nausea or vomiting    sodium bicarbonate 325 MG tablet 1 tablet (325 mg) by Per J Tube route every 4 hours    amylase-lipase-protease (CREON 24) 56392 UNITS CPEP per EC capsule 1-2 capsules (24,000-48,000 Units) by Per J Tube route every 4 hours    pregabalin (LYRICA) 20 MG/ML solution 2.5 mLs (50 mg) by Per J Tube route 2 times daily    amitriptyline (ELAVIL) 2 MG/ML solution 10 mLs (20 mg) by Per J Tube route At Bedtime    glucose 40 % GEL gel Take 15-30 g by mouth every 15 minutes as needed for low blood sugar    promethazine (PHENERGAN) 6.25 MG/5ML syrup 10 mLs (12.5 mg) by Per Feeding Tube route every 4 hours as needed for nausea    prochlorperazine (COMPAZINE) 5 MG tablet 1 tablet (5 mg) by Per J Tube route every 6 hours    docusate (COLACE) 50 MG/5ML liquid 10 mLs (100 mg) by Per J Tube route 2 times daily    multivitamins with minerals (CERTAVITE/CEROVITE) LIQD liquid 15 mLs by Per Feeding Tube route daily (with lunch)    levothyroxine (SYNTHROID) 25 mcg/mL 5 mLs (125 mcg) by Per J Tube route every morning (before breakfast)    Sharps Container (BD SHARPS ) MISC 1 Container as needed    insulin pen needle (BD KEN U/F) 32G X 4 MM Use 4-6 times per day    Alcohol Swabs PADS 1 pad as needed    blood glucose monitoring (ACCU-CHEK MULTICLIX) lancets Use up to 6 times daily    blood glucose monitoring (ACCU-CHEK EMMY PLUS)  meter device kit 1 kit by In Vitro route every 4 hours Use to test blood sugar 6 times daily or as directed.    Or whatever is covered    blood glucose monitoring (ACCU-CHEK EMMY) test strip Use to test blood sugar 6 times daily or as directed.    blood glucose monitoring (PAT CONTOUR NEXT) test strip Use to test blood sugar 6-8 times daily or as directed.    blood glucose monitoring (PAT MICROLET) lancets Use to test blood sugar 6-8 times daily or as directed.        Physical exam:   General Appearance: in no apparent distress.   Temp:  [98.4  F (36.9  C)] 98.4  F (36.9  C)  Pulse:  [96] 96  Resp:  [16] 16  BP: (116)/(76) 116/76 mmHg  SpO2:  [100 %] 100 %   Skin: Normal, no rashes or jaundice  Heart: Regular rhythm.  Lungs: no audible wheezes or increased work of breathing.  Abdomen: The abdomen is flat, and the wound is healing well, without hernia.  Tube exit site is clean. The abdomen is non-tender.    Edema: absent.    Chronic Pancreatitis Latest Ref Rng 1/6/2017 1/7/2017 1/8/2017 1/9/2017 1/11/2017   Weight - 68.539 kg 67.813 kg 66.951 kg 65.137 kg -   AMYLASE 30 - 110 U/L - - - 28(L) -   LIPASE 73 - 393 U/L - - - 36(L) -   HEMOGLOBIN A1C 4.3 - 6.0 % - - - - -   C PEPTIDE 0.9 - 6.9 ng/mL - - - - -   GLUCOSE 70 - 99 mg/dL - - - 144(H) 102(H)   HGB 11.7 - 15.7 g/dL - - - 11.3(L) 11.1(L)    - 450 10e9/L - - - 1420(HH) 1394(HH)   ALBUMIN 3.4 - 5.0 g/dL - - - - 3.5   PREALBUMIN 15 - 45 mg/dL - - - - -       Assessment and Plan: She is doing well s/p TP-IAT.  Interval progress has been good.  Postoperative gastric ileus: resolving. Will start clamping and report to [facility] on Saturday morning.  Pancreatic exocrine insufficiency: well controlled  Malnutrition: not present.  Continue TF at current rate.  Continue free water at current rate.  Diabetes mellitus: improving control - will benefit from IV hydration today and will check urine for UA/Cx to r/o cause for recent increased lantus needs, although I  would favor cause to be better TF absorption given decrease in diarrhea.  Abdominal pain management: will decrease dilaudid to 2 mg as needed (may take 2-3mg prn q3) and will interject tylenol at 650 mg QID prn to increase interval between dilaudid.  Casual wean is started, with caution to not sharply reduce daily dose and precipitate more pain - I'd rather have her able to increase activity and get better sleep this week.  Bowel regimen: decrease colace to 50 mg QAM, senna 8.8 mg QAM, and continue 30 ml MOM QAM  Followup: I will see her again in clinic in 1  week (next Tuesday)    Total time: 25 min, Counselling Time: 20 min.        Nestor Phoenix MD  Department of Surgery

## 2017-01-12 NOTE — Clinical Note
1/12/2017      RE: Dinora Mcghee  [address]  [facility] [id number]       Chronic Pancreatitis Group Progress Note    I had the pleasure of seeing Ms. Dinora Mcghee today. 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   Interval events since last visit with me:   ER visits = 0, reasons: n/a  Inpatient admissions = 0, reasons n/a  The patient reports their current symptoms to be:   GI function:   Nausea:   []  none    [x]  rare    []  often    []  daily  Comment: compazine prn 1-2 x day  Vomiting: [x]  none    []  rare    []  often    []  daily   Comment: GT not clamped.  /day - thin/clear/tf flecked  Last BM: Today.  Stools are liquid, frequency : TID.   Appetite: good - HUNGRY! But still NPO x ice chips  Tube feeds at 60 cc/hr, plus 40 cc/hr H20,  24 hr cycle.  Pancreatic exocrine insufficiency:   Takes (Creon 24), 2 with 4 hr TF cycle  Greasy stools: [x]  none   []  rarely    []  several times/wk   []  daily      Comment:   Diabetes management:   Long acting insulin: Lantus--20 units/day  Short acting insulin: Novolog--3-6 units/day (so increased lantus today from 17 to 20)  Blood sugars typically range from 128 to 150.   A1C      4.5   12/19/2016  A1C      4.5   11/29/2016   Pain management:   Pain rating (0=no pain, 10=unbearable): 6  Long acting agent: none   Short acting agent: dilaudid 3 mg every 3 hr prn, about 5 doses per day. Pain worse at night, but ativan and tylenol helps.  Has not had ibuprofen.  Lyrica 50 BID  Daily 'Uptime':  Napping and some dozing periods.  Walking: distance not regular yet.    Prescription Medications as of 1/12/2017             insulin glargine (LANTUS) 100 UNIT/ML injection Inject 20 units subcutaneously every day at 8:00 AM.    study - etanercept (IDS# 4454) 25 MG/0.5ML subcutaneous injection Inject 25 mg Subcutaneous once for 1 dose    aspirin 325 MG tablet Take 1 tablet (325 mg) by mouth daily Take 1 aspirin, 325 mg. Crush the  aspirin and administer via jejunal tube. Flush with 30 cc of free water.    magnesium hydroxide (MILK OF MAGNESIA) 400 MG/5ML suspension 30 mLs by Per J Tube route daily    sennosides (SENOKOT) 8.8 MG/5ML syrup 10 mLs by Per Feeding Tube route At Bedtime    pantoprazole (PROTONIX) 2 mg/mL suspension 20 mLs (40 mg) by Per Feeding Tube route 2 times daily    insulin aspart (NOVOLOG PEN) 100 UNIT/ML injection Inject 1-6 Units Subcutaneous every 4 hours    HYDROmorphone (DILAUDID) 1 MG/ML LIQD liquid 3 mLs (3 mg) by Per Feeding Tube route every 3 hours    LORazepam (ATIVAN) 2 MG/ML (HIGH CONC) solution Take 0.25 mLs (0.5 mg) by mouth every 6 hours as needed for anxiety, nausea or vomiting    sodium bicarbonate 325 MG tablet 1 tablet (325 mg) by Per J Tube route every 4 hours    amylase-lipase-protease (CREON 24) 21111 UNITS CPEP per EC capsule 1-2 capsules (24,000-48,000 Units) by Per J Tube route every 4 hours    pregabalin (LYRICA) 20 MG/ML solution 2.5 mLs (50 mg) by Per J Tube route 2 times daily    amitriptyline (ELAVIL) 2 MG/ML solution 10 mLs (20 mg) by Per J Tube route At Bedtime    glucose 40 % GEL gel Take 15-30 g by mouth every 15 minutes as needed for low blood sugar    promethazine (PHENERGAN) 6.25 MG/5ML syrup 10 mLs (12.5 mg) by Per Feeding Tube route every 4 hours as needed for nausea    prochlorperazine (COMPAZINE) 5 MG tablet 1 tablet (5 mg) by Per J Tube route every 6 hours    docusate (COLACE) 50 MG/5ML liquid 10 mLs (100 mg) by Per J Tube route 2 times daily    multivitamins with minerals (CERTAVITE/CEROVITE) LIQD liquid 15 mLs by Per Feeding Tube route daily (with lunch)    levothyroxine (SYNTHROID) 25 mcg/mL 5 mLs (125 mcg) by Per J Tube route every morning (before breakfast)    Sharps Container (BD SHARPS ) MISC 1 Container as needed    insulin pen needle (BD KEN U/F) 32G X 4 MM Use 4-6 times per day    Alcohol Swabs PADS 1 pad as needed    blood glucose monitoring (ACCU-CHEK MULTICLIX)  lancets Use up to 6 times daily    blood glucose monitoring (ACCU-CHEK EMMY PLUS) meter device kit 1 kit by In Vitro route every 4 hours Use to test blood sugar 6 times daily or as directed.    Or whatever is covered    blood glucose monitoring (ACCU-CHEK EMMY) test strip Use to test blood sugar 6 times daily or as directed.    blood glucose monitoring (PAT CONTOUR NEXT) test strip Use to test blood sugar 6-8 times daily or as directed.    blood glucose monitoring (PAT MICROLET) lancets Use to test blood sugar 6-8 times daily or as directed.        Physical exam:   General Appearance: in no apparent distress.   Temp:  [98.4  F (36.9  C)] 98.4  F (36.9  C)  Pulse:  [96] 96  Resp:  [16] 16  BP: (116)/(76) 116/76 mmHg  SpO2:  [100 %] 100 %   Skin: Normal, no rashes or jaundice  Heart: Regular rhythm.  Lungs: no audible wheezes or increased work of breathing.  Abdomen: The abdomen is flat, and the wound is healing well, without hernia.  Tube exit site is clean. The abdomen is non-tender.    Edema: absent.    Chronic Pancreatitis Latest Ref Rng 1/6/2017 1/7/2017 1/8/2017 1/9/2017 1/11/2017   Weight - 68.539 kg 67.813 kg 66.951 kg 65.137 kg -   AMYLASE 30 - 110 U/L - - - 28(L) -   LIPASE 73 - 393 U/L - - - 36(L) -   HEMOGLOBIN A1C 4.3 - 6.0 % - - - - -   C PEPTIDE 0.9 - 6.9 ng/mL - - - - -   GLUCOSE 70 - 99 mg/dL - - - 144(H) 102(H)   HGB 11.7 - 15.7 g/dL - - - 11.3(L) 11.1(L)    - 450 10e9/L - - - 1420(HH) 1394(HH)   ALBUMIN 3.4 - 5.0 g/dL - - - - 3.5   PREALBUMIN 15 - 45 mg/dL - - - - -       Assessment and Plan: She is doing well s/p TP-IAT.  Interval progress has been good.  Postoperative gastric ileus: resolving. Will start clamping and report to [place] on Saturday morning.  Pancreatic exocrine insufficiency: well controlled  Malnutrition: not present.  Continue TF at current rate.  Continue free water at current rate.  Diabetes mellitus: improving control - will benefit from IV hydration today and will  check urine for UA/Cx to r/o cause for recent increased lantus needs, although I would favor cause to be better TF absorption given decrease in diarrhea.  Abdominal pain management: will decrease dilaudid to 2 mg as needed (may take 2-3mg prn q3) and will interject tylenol at 650 mg QID prn to increase interval between dilaudid.  Casual wean is started, with caution to not sharply reduce daily dose and precipitate more pain - I'd rather have her able to increase activity and get better sleep this week.  Bowel regimen: decrease colace to 50 mg QAM, senna 8.8 mg QAM, and continue 30 ml MOM QAM  Followup: I will see her again in clinic in 1  week (next Tuesday)    Total time: 25 min, Counselling Time: 20 min.        Nestor Phoenix MD  Department of Surgery

## 2017-01-12 NOTE — PROGRESS NOTES
Infusion Nursing Note:  Dinora Mcghee presents today to [facility] for a Lactated Ringers infusion.  During today's [facility] appointment orders from Dr. Phoenix were completed.  Frequency: once    Progress note:  ID verified by name and .  Assessment completed. Vitals were stable throughout time in [facility]. Patient education regarding infusion and possible side effects provided.  Patient verbalized understanding.   Patient reports ongoing diarrhea.    Premedications: were not ordered.    Infusion Rates: Infusion given over approximately 2 hours. Each bag of LR infused over 1 hour.     Labs: Urine was collected as ordered.    Vascular access: Port accessed today, then heparin locked and de accessed at completion of appointment.    Treatment Conditions:   No treatment conditions.    Patient tolerated infusion well.    Discharge Plan:   Follow up plan of care with: Primary Medical Doctor  Discharge instructions were reviewed with patient.  Patient/representative verbalized understanding of discharge instructions and all questions answered.    Patient discharged from [facility] in stable condition.    Ericka Severson, RN    Administrations This Visit     lactated ringers BOLUS 2,000 mL     Admin Date Action Dose Route Administered By             2017 New Bag 2000 mL Intravenous Severson, Ericka, RN                          /76 mmHg  Pulse 96  Temp(Src) 98.4  F (36.9  C) (Oral)  Resp 16  SpO2 100%

## 2017-01-12 NOTE — TELEPHONE ENCOUNTER
Spoke with Dinora to let her know I contacted Celina [phone] at the [facility] for an infusion appointment on Saturday, 1/14 @ 10:30 am for fluids. There are orders in their system for 1,500 ml's of LR.

## 2017-01-12 NOTE — TELEPHONE ENCOUNTER
"Dinora reports feeling dehydrated, 3 diarrhea stools yesterday, that she reports \"is draining me\", urinating large amounts \"400-600\" each void that is a dark karime in color. She reported waking up at 1:00 AM this morning in severe pain. She stated that it was too early for another dose of pain medication so she took ativan and 1,000 mg of Tylenol and was able to get back to sleep. She described the pain as \"surgical\" pain. Dinora has been able to lengthen the time between her pain medications and \"nausea med's\" so was concerned about this event. Further discussion she thinks this may have been due to the long drive yesterday to come here for lab/study appointment. On their way home her formula bag began leaking so they needed to return back here to get an new bag. She said the drive was long and very bumpy and they stopped in [place] on their way home to visit a friend. Upon leaving there was when she noted the leak in her formula bag. She is able to sleep mostly while in the car. Dinora began navigating her stairs at home with assistance and noticed that her heart rate increased. When she returned to rest she said her heart rate was still 98. This was concerning to her. At rest during our morning conversation her HR was 88.   She clamped her gastrostomy tube 1/11/17 at 15 minute intervals without any nausea. During her daily shower she is clamped for 25 minutes.   She is not experiencing severe pain today. She would like to have some fluids while here to her follow up with Dr. Phoenix. Therapy plan for 2 liters of LR has been placed. Lantus dose increased to 20 units by Dr. Melissa this morning.  "

## 2017-01-12 NOTE — NURSING NOTE
"Chief Complaint   Patient presents with     TP-IAT Transplant     POD 15       Initial /76 mmHg  Pulse 96  Temp(Src) 98.4  F (36.9  C) (Oral)  Resp 16  SpO2 100% Estimated body mass index is 21.84 kg/(m^2) as calculated from the following:    Height as of 12/28/16: 1.727 m (5' 8\").    Weight as of 1/9/17: 65.137 kg (143 lb 9.6 oz).  BP completed using cuff size: regular    "

## 2017-01-12 NOTE — TELEPHONE ENCOUNTER
"Message below received through My chart 06.58    Sent to Dr Melissa who advised to increase Lantus to 20 units. Dinora informed and repeated this back. Discussed her low BP( 95/63) and pulse racing when she walks upstairs. She still has diarrhea and feels \"dry : Suggested she plan for possible fluids in ATC after her appt with Dr Phoenix who can make this decision.      1/11/17: gave 17 units long acting per [place].   11:15am 118; 3:10 5am 131 gave 1N; 7:00 107; 11:00 152 HAVE 1N; 3:00 150, gave 1N; 6:45am 136 gave 1N.   "

## 2017-01-13 ENCOUNTER — TELEPHONE (OUTPATIENT)
Dept: TRANSPLANT | Facility: CLINIC | Age: 52
End: 2017-01-13

## 2017-01-13 DIAGNOSIS — Z90.410 HISTORY OF PANCREATECTOMY: Primary | ICD-10-CM

## 2017-01-13 NOTE — TELEPHONE ENCOUNTER
"Dinora's most recent blood sugar values:  Thursday 1/12/17:  3 am: 150. Correction 1 unit Novolog  6:45 AM: 136. Correction 1 unit Novolog  10:56 AM: 128. Correction 1 unit Novolog  3:30 PM: 131. Correction 1 unit Novolog  7:30 PM: 117. No correction  11:30 PM: 112. No correction     Friday 1/13/17:  3:30 AM: 117. No correction  7:30 AM: 115. No correction    Notified Dinora no changes to morning Lantus. Continue 20 units. Dinora had her g-tube capped from 2:30 PM to bedtime at 10:30 1/12/17, where she re-connected to drainage bag. While in the ATC 1/12/17 she was feeling full so did uncap briefly to vent. Total of 100 cc were drained along with a \"large gas bubble\". Dinora re-capped and had no further needs to vent until bedtime. She reports feeling very sore today and had \"a rough night\". She is describing the pain along the right upper abdomen above where the drain site was. She thinks it is rib pain. Stated couldn't get comfortable. She took 650 mg Tylenol and 3 mg Dilaudid. Had 7 total diarrhea stools 1/12/17. Urine remains dark karime. She got up to urinate 3 times during the night. Dinora's formula and water bags have been coming apart at the junction where the tubes meet and have been leaking all over. This RN called [facility], spoke with Candis ANDERSON who will f/u with staff that are managing her tube feeds. Dinora reports feeling better after receiving 2 liters LR in the ATC 1/12/17. Will get additional fluids in [place] 1/14/17.  "

## 2017-01-14 ENCOUNTER — TELEPHONE (OUTPATIENT)
Dept: TRANSPLANT | Facility: CLINIC | Age: 52
End: 2017-01-14

## 2017-01-15 ENCOUNTER — TELEPHONE (OUTPATIENT)
Dept: TRANSPLANT | Facility: CLINIC | Age: 52
End: 2017-01-15

## 2017-01-15 DIAGNOSIS — Z90.410 ACQUIRED TOTAL ABSENCE OF PANCREAS: Primary | ICD-10-CM

## 2017-01-15 RX ORDER — SODIUM BICARBONATE 325 MG/1
325 TABLET ORAL EVERY 4 HOURS
Qty: 84 TABLET | Refills: 2 | Status: SHIPPED | OUTPATIENT
Start: 2017-01-15 | End: 2017-01-16

## 2017-01-15 RX ORDER — PREGABALIN 20 MG/ML
50 SOLUTION ORAL 2 TIMES DAILY
Qty: 70 ML | Refills: 1 | Status: SHIPPED | OUTPATIENT
Start: 2017-01-15 | End: 2017-01-16

## 2017-01-15 NOTE — TELEPHONE ENCOUNTER
Dinora called asking about her Lautus dose. Asked her to stay at 20 units. She remained capped all day until 2 AM today when she vented and got 750 cc of thin brownish-green flecked fluid. Slept well until 4 AM and needed to get up in the chair and fall back to sleep.Reports her pain is being managed with Dilaudid 2 mg every 3 hours and Tylenol 650 mg. Friday night she only took Tylenol and slept well. Dinora reported that due to the dual feed bags being defective she is now flushing water 80 mls every 4 hours. She had 3 episodes where the bags leaked at the junction where the two tubes would connect to the pump. [facility] is managing. Med's reordered for her. She has no complaints or additional questions at this time. Will check with her throughout the day.

## 2017-01-16 DIAGNOSIS — Z90.410 ACQUIRED TOTAL ABSENCE OF PANCREAS: Primary | ICD-10-CM

## 2017-01-16 DIAGNOSIS — K86.89 PANCREATIC INSUFFICIENCY: Primary | ICD-10-CM

## 2017-01-16 RX ORDER — SODIUM BICARBONATE 325 MG/1
325 TABLET ORAL EVERY 4 HOURS
Qty: 84 TABLET | Refills: 2 | Status: SHIPPED | OUTPATIENT
Start: 2017-01-16 | End: 2017-02-10

## 2017-01-16 RX ORDER — PREGABALIN 20 MG/ML
50 SOLUTION ORAL 2 TIMES DAILY
Qty: 70 ML | Refills: 1 | Status: SHIPPED | OUTPATIENT
Start: 2017-01-16 | End: 2017-02-21

## 2017-01-17 ENCOUNTER — OFFICE VISIT (OUTPATIENT)
Dept: TRANSPLANT | Facility: CLINIC | Age: 52
End: 2017-01-17
Attending: SURGERY
Payer: COMMERCIAL

## 2017-01-17 ENCOUNTER — TELEPHONE (OUTPATIENT)
Dept: TRANSPLANT | Facility: CLINIC | Age: 52
End: 2017-01-17

## 2017-01-17 ENCOUNTER — RESULTS ONLY (OUTPATIENT)
Dept: LAB | Age: 52
End: 2017-01-17

## 2017-01-17 VITALS
OXYGEN SATURATION: 97 % | DIASTOLIC BLOOD PRESSURE: 66 MMHG | SYSTOLIC BLOOD PRESSURE: 107 MMHG | HEART RATE: 93 BPM | RESPIRATION RATE: 14 BRPM | TEMPERATURE: 97.9 F

## 2017-01-17 DIAGNOSIS — Z90.410 POST-PANCREATECTOMY DIABETES (H): ICD-10-CM

## 2017-01-17 DIAGNOSIS — Z90.410 HISTORY OF PANCREATECTOMY: ICD-10-CM

## 2017-01-17 DIAGNOSIS — E89.1 POST-PANCREATECTOMY DIABETES (H): ICD-10-CM

## 2017-01-17 DIAGNOSIS — K86.89 PANCREATIC INSUFFICIENCY: ICD-10-CM

## 2017-01-17 DIAGNOSIS — Z90.410 HISTORY OF PANCREATECTOMY: Primary | ICD-10-CM

## 2017-01-17 DIAGNOSIS — E13.9 POST-PANCREATECTOMY DIABETES (H): Primary | ICD-10-CM

## 2017-01-17 DIAGNOSIS — G89.18 ACUTE POST-OPERATIVE PAIN: ICD-10-CM

## 2017-01-17 DIAGNOSIS — Z90.410 ACQUIRED TOTAL ABSENCE OF PANCREAS: Primary | ICD-10-CM

## 2017-01-17 DIAGNOSIS — E89.1 POST-PANCREATECTOMY DIABETES (H): Primary | ICD-10-CM

## 2017-01-17 DIAGNOSIS — K86.89 PANCREATIC INSUFFICIENCY: Primary | ICD-10-CM

## 2017-01-17 DIAGNOSIS — Z90.410 POST-PANCREATECTOMY DIABETES (H): Primary | ICD-10-CM

## 2017-01-17 DIAGNOSIS — E13.9 POST-PANCREATECTOMY DIABETES (H): ICD-10-CM

## 2017-01-17 PROBLEM — K86.1 CHRONIC PANCREATITIS (H): Status: ACTIVE | Noted: 2017-01-17

## 2017-01-17 LAB
ALBUMIN SERPL-MCNC: 3.4 G/DL (ref 3.4–5)
ALP SERPL-CCNC: 350 U/L (ref 40–150)
ALT SERPL W P-5'-P-CCNC: 52 U/L (ref 0–50)
ANION GAP SERPL CALCULATED.3IONS-SCNC: 4 MMOL/L (ref 3–14)
AST SERPL W P-5'-P-CCNC: 43 U/L (ref 0–45)
BASOPHILS # BLD AUTO: 0.1 10E9/L (ref 0–0.2)
BASOPHILS NFR BLD AUTO: 1.3 %
BILIRUB SERPL-MCNC: 0.6 MG/DL (ref 0.2–1.3)
BUN SERPL-MCNC: 27 MG/DL (ref 7–30)
CALCIUM SERPL-MCNC: 9.6 MG/DL (ref 8.5–10.1)
CHLORIDE SERPL-SCNC: 103 MMOL/L (ref 94–109)
CO2 SERPL-SCNC: 36 MMOL/L (ref 20–32)
CREAT SERPL-MCNC: 0.58 MG/DL (ref 0.52–1.04)
D DIMER PPP FEU-MCNC: 2.7 UG/ML FEU (ref 0–0.5)
DIFFERENTIAL METHOD BLD: ABNORMAL
EOSINOPHIL # BLD AUTO: 1.4 10E9/L (ref 0–0.7)
EOSINOPHIL NFR BLD AUTO: 13.6 %
ERYTHROCYTE [DISTWIDTH] IN BLOOD BY AUTOMATED COUNT: 13.7 % (ref 10–15)
GFR SERPL CREATININE-BSD FRML MDRD: ABNORMAL ML/MIN/1.7M2
GLUCOSE SERPL-MCNC: 98 MG/DL (ref 70–99)
HCT VFR BLD AUTO: 35.2 % (ref 35–47)
HGB BLD-MCNC: 10.5 G/DL (ref 11.7–15.7)
IMM GRANULOCYTES # BLD: 0 10E9/L (ref 0–0.4)
IMM GRANULOCYTES NFR BLD: 0.1 %
LYMPHOCYTES # BLD AUTO: 1.9 10E9/L (ref 0.8–5.3)
LYMPHOCYTES NFR BLD AUTO: 18.9 %
MCH RBC QN AUTO: 28 PG (ref 26.5–33)
MCHC RBC AUTO-ENTMCNC: 29.8 G/DL (ref 31.5–36.5)
MCV RBC AUTO: 94 FL (ref 78–100)
MONOCYTES # BLD AUTO: 1.4 10E9/L (ref 0–1.3)
MONOCYTES NFR BLD AUTO: 13.4 %
NEUTROPHILS # BLD AUTO: 5.3 10E9/L (ref 1.6–8.3)
NEUTROPHILS NFR BLD AUTO: 52.7 %
NRBC # BLD AUTO: 0 10*3/UL
NRBC BLD AUTO-RTO: 0 /100
PLATELET # BLD AUTO: 1234 10E9/L (ref 150–450)
POTASSIUM SERPL-SCNC: 3.6 MMOL/L (ref 3.4–5.3)
PREALB SERPL IA-MCNC: 25 MG/DL (ref 15–45)
PROT SERPL-MCNC: 7.8 G/DL (ref 6.8–8.8)
RBC # BLD AUTO: 3.75 10E12/L (ref 3.8–5.2)
SODIUM SERPL-SCNC: 143 MMOL/L (ref 133–144)
WBC # BLD AUTO: 10.1 10E9/L (ref 4–11)

## 2017-01-17 PROCEDURE — 84134 ASSAY OF PREALBUMIN: CPT | Performed by: SURGERY

## 2017-01-17 PROCEDURE — 17250 CHEM CAUT OF GRANLTJ TISSUE: CPT

## 2017-01-17 PROCEDURE — 99211 OFF/OP EST MAY X REQ PHY/QHP: CPT | Mod: ZF

## 2017-01-17 RX ORDER — OXYCODONE HYDROCHLORIDE 5 MG/1
5 TABLET ORAL EVERY 4 HOURS PRN
Qty: 210 TABLET | Refills: 0 | Status: SHIPPED | OUTPATIENT
Start: 2017-01-17 | End: 2017-02-10

## 2017-01-17 RX ORDER — AMITRIPTYLINE HCL 25 MG
20 TABLET ORAL AT BEDTIME
Qty: 80 ML | Refills: 2 | Status: SHIPPED
Start: 2017-01-17 | End: 2017-02-21

## 2017-01-17 NOTE — PROGRESS NOTES
Chronic Pancreatitis Group Progress Note    I had the pleasure of seeing Ms. Dinora Mcghee today. 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    Interval events since last visit with me:   ER visits = 0, reasons: n/a  Inpatient admissions = 0, reasons n/a  The patient reports their current symptoms to be: needed IVF a couple times since last week.  GI function:   Nausea:   []  none    [x]  rare    []  often    []  daily  Comment: previtamin or at night - compazine prn  Vomiting: [x]  none    []  rare    []  often    []  daily   Comment:   Last BM:2 days.  Stools are liquid, frequency : senna/colace 1 QD, MOM QD.   Appetite: good  Tube feeds at 60 cc/hr, 0 hr cycle.  60 cc free water flush  Oral food intake: very little to none per day - ice chips  Pancreatic exocrine insufficiency:   Takes (Creon 24), 2 with 4h TF supply  Greasy stools: [x]  none   []  rarely    []  several times/wk   []  daily      Comment:  Sticky  Diabetes management:   Long acting insulin: Lantus--24 units/day  Short acting insulin: Novolog-- 1-3 units/day  Blood sugars typically range from 90 to 162.   A1C      4.5   12/19/2016  A1C      4.5   11/29/2016   Pain management:   Pain rating (0=no pain, 10=unbearable): 5  Long acting agent: none   Short acting agent: dilaudid 2-3 mg q3hr.  Daily 'Uptime': most of the day, still napping.  Walking: distance 1 mi, 2 times per week    Prescription Medications as of 1/17/2017             insulin glargine (LANTUS) 100 UNIT/ML injection Inject 24 units subcutaneously every day at 8:00 AM.    amitriptyline (ELAVIL) 2 MG/ML solution 10 mLs (20 mg) by Per J Tube route At Bedtime    levothyroxine (SYNTHROID) 25 mcg/mL 5 mLs (125 mcg) by Per J Tube route every morning (before breakfast)    pantoprazole (PROTONIX) 2 mg/mL suspension 20 mLs (40 mg) by Per J Tube route 2 times daily    sodium bicarbonate 325 MG tablet 1 tablet (325 mg) by Per J Tube route every 4 hours    pregabalin  (LYRICA) 20 MG/ML solution 2.5 mLs (50 mg) by Per J Tube route 2 times daily    study - etanercept (IDS# 4454) 25 MG/0.5ML subcutaneous injection Inject 25 mg Subcutaneous once for 1 dose On 1/17/17, day 21 dose    sennosides (SENOKOT) 8.8 MG/5ML syrup 10 mLs by Per Feeding Tube route every morning    docusate (COLACE) 50 MG/5ML liquid 5 mLs (50 mg) by Per J Tube route daily    aspirin 325 MG tablet Take 1 tablet (325 mg) by mouth daily Take 1 aspirin, 325 mg. Crush the aspirin and administer via jejunal tube. Flush with 30 cc of free water.    magnesium hydroxide (MILK OF MAGNESIA) 400 MG/5ML suspension 30 mLs by Per J Tube route daily    insulin aspart (NOVOLOG PEN) 100 UNIT/ML injection Inject 1-6 Units Subcutaneous every 4 hours    HYDROmorphone (DILAUDID) 1 MG/ML LIQD liquid 3 mLs (3 mg) by Per Feeding Tube route every 3 hours    LORazepam (ATIVAN) 2 MG/ML (HIGH CONC) solution Take 0.25 mLs (0.5 mg) by mouth every 6 hours as needed for anxiety, nausea or vomiting    amylase-lipase-protease (CREON 24) 18026 UNITS CPEP per EC capsule 1-2 capsules (24,000-48,000 Units) by Per J Tube route every 4 hours    glucose 40 % GEL gel Take 15-30 g by mouth every 15 minutes as needed for low blood sugar    promethazine (PHENERGAN) 6.25 MG/5ML syrup 10 mLs (12.5 mg) by Per Feeding Tube route every 4 hours as needed for nausea    prochlorperazine (COMPAZINE) 5 MG tablet 1 tablet (5 mg) by Per J Tube route every 6 hours    multivitamins with minerals (CERTAVITE/CEROVITE) LIQD liquid 15 mLs by Per Feeding Tube route daily (with lunch)    Sharps Container (BD SHARPS ) MISC 1 Container as needed    insulin pen needle (BD KEN U/F) 32G X 4 MM Use 4-6 times per day    Alcohol Swabs PADS 1 pad as needed    blood glucose monitoring (ACCU-CHEK MULTICLIX) lancets Use up to 6 times daily    blood glucose monitoring (ACCU-CHEK EMMY PLUS) meter device kit 1 kit by In Vitro route every 4 hours Use to test blood sugar 6 times  daily or as directed.    Or whatever is covered    blood glucose monitoring (ACCU-CHEK EMMY) test strip Use to test blood sugar 6 times daily or as directed.    blood glucose monitoring (PAT CONTOUR NEXT) test strip Use to test blood sugar 6-8 times daily or as directed.    blood glucose monitoring (PAT MICROLET) lancets Use to test blood sugar 6-8 times daily or as directed.        Physical exam:   General Appearance: in no apparent distress.   Temp:  [97.9  F (36.6  C)] 97.9  F (36.6  C)  Pulse:  [93] 93  Resp:  [14] 14  BP: (107)/(66) 107/66 mmHg  SpO2:  [97 %] 97 %   Skin: Normal, no rashes or jaundice  Heart: Regular rhythm.  Lungs: no audible wheezes or increased work of breathing.  Abdomen: The abdomen is rounded, and the wound is Healing well, without hernia.  Tube exit site is clean. The abdomen is non-tender.    Edema: absent.    Chronic Pancreatitis Latest Ref Rng 1/7/2017 1/8/2017 1/9/2017 1/11/2017 1/17/2017   Weight - 67.813 kg 66.951 kg 65.137 kg - -   AMYLASE 30 - 110 U/L - - 28(L) - -   LIPASE 73 - 393 U/L - - 36(L) - -   HEMOGLOBIN A1C 4.3 - 6.0 % - - - - -   C PEPTIDE 0.9 - 6.9 ng/mL - - - - -   GLUCOSE 70 - 99 mg/dL - - 144(H) 102(H) 98   HGB 11.7 - 15.7 g/dL - - 11.3(L) 11.1(L) 10.5(L)    - 450 10e9/L - - 1420(HH) 1394(HH) 1234(HH)   ALBUMIN 3.4 - 5.0 g/dL - - - 3.5 3.4   PREALBUMIN 15 - 45 mg/dL - - - - 25       Assessment and Plan: She is doing well s/p TP-IAT.  Interval progress has been good.  Postoperative gastric ileus: resolving.  Clears to full sugar free liquids. Will keep food diary.  GT site treated with silver nitrate for small hypertrophic granulation tissue.  Will change 2x2 daily or more often to promote dryness.   Zipline removed.   Pancreatic exocrine insufficiency: well controlled but sticky stools.  Continue same creon with TF but take 1-2 oral with po intake  Alkaline phosphatase: elevated, unclear etiology.  Appears to be plateauing, will follow.  Malnutrition:  not present.  Diabetes mellitus: well controlled, appreciate endocrine recs  Abdominal pain management: will change dilaudid to oxycodone for better coverage (based on past experience).  5 mg per dose, Q4-6 prn.  Followup: I will see her again in clinic in 2  weeks    Total time: 25 min, Counselling Time: 20 min.        Nestor Phoenix MD  Department of Surgery

## 2017-01-17 NOTE — Clinical Note
1/17/2017      RE: Dinora Mcghee  [address]  [facility] [address]       Chronic Pancreatitis Group Progress Note    I had the pleasure of seeing Ms. Dinora Mcghee today. 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    Interval events since last visit with me:   ER visits = 0, reasons: n/a  Inpatient admissions = 0, reasons n/a  The patient reports their current symptoms to be: needed IVF a couple times since last week.  GI function:   Nausea:   []  none    [x]  rare    []  often    []  daily  Comment: previtamin or at night - compazine prn  Vomiting: [x]  none    []  rare    []  often    []  daily   Comment:   Last BM:2 days.  Stools are liquid, frequency : senna/colace 1 QD, MOM QD.   Appetite: good  Tube feeds at 60 cc/hr, 0 hr cycle.  60 cc free water flush  Oral food intake: very little to none per day - ice chips  Pancreatic exocrine insufficiency:   Takes (Creon 24), 2 with 4h TF supply  Greasy stools: [x]  none   []  rarely    []  several times/wk   []  daily      Comment:  Sticky  Diabetes management:   Long acting insulin: Lantus--24 units/day  Short acting insulin: Novolog-- 1-3 units/day  Blood sugars typically range from 90 to 162.   A1C      4.5   12/19/2016  A1C      4.5   11/29/2016   Pain management:   Pain rating (0=no pain, 10=unbearable): 5  Long acting agent: none   Short acting agent: dilaudid 2-3 mg q3hr.  Daily 'Uptime': most of the day, still napping.  Walking: distance 1 mi, 2 times per week    Prescription Medications as of 1/17/2017             insulin glargine (LANTUS) 100 UNIT/ML injection Inject 24 units subcutaneously every day at 8:00 AM.    amitriptyline (ELAVIL) 2 MG/ML solution 10 mLs (20 mg) by Per J Tube route At Bedtime    levothyroxine (SYNTHROID) 25 mcg/mL 5 mLs (125 mcg) by Per J Tube route every morning (before breakfast)    pantoprazole (PROTONIX) 2 mg/mL suspension 20 mLs (40 mg) by Per J Tube route 2 times daily    sodium  bicarbonate 325 MG tablet 1 tablet (325 mg) by Per J Tube route every 4 hours    pregabalin (LYRICA) 20 MG/ML solution 2.5 mLs (50 mg) by Per J Tube route 2 times daily    study - etanercept (IDS# 4454) 25 MG/0.5ML subcutaneous injection Inject 25 mg Subcutaneous once for 1 dose On 1/17/17, day 21 dose    sennosides (SENOKOT) 8.8 MG/5ML syrup 10 mLs by Per Feeding Tube route every morning    docusate (COLACE) 50 MG/5ML liquid 5 mLs (50 mg) by Per J Tube route daily    aspirin 325 MG tablet Take 1 tablet (325 mg) by mouth daily Take 1 aspirin, 325 mg. Crush the aspirin and administer via jejunal tube. Flush with 30 cc of free water.    magnesium hydroxide (MILK OF MAGNESIA) 400 MG/5ML suspension 30 mLs by Per J Tube route daily    insulin aspart (NOVOLOG PEN) 100 UNIT/ML injection Inject 1-6 Units Subcutaneous every 4 hours    HYDROmorphone (DILAUDID) 1 MG/ML LIQD liquid 3 mLs (3 mg) by Per Feeding Tube route every 3 hours    LORazepam (ATIVAN) 2 MG/ML (HIGH CONC) solution Take 0.25 mLs (0.5 mg) by mouth every 6 hours as needed for anxiety, nausea or vomiting    amylase-lipase-protease (CREON 24) 54402 UNITS CPEP per EC capsule 1-2 capsules (24,000-48,000 Units) by Per J Tube route every 4 hours    glucose 40 % GEL gel Take 15-30 g by mouth every 15 minutes as needed for low blood sugar    promethazine (PHENERGAN) 6.25 MG/5ML syrup 10 mLs (12.5 mg) by Per Feeding Tube route every 4 hours as needed for nausea    prochlorperazine (COMPAZINE) 5 MG tablet 1 tablet (5 mg) by Per J Tube route every 6 hours    multivitamins with minerals (CERTAVITE/CEROVITE) LIQD liquid 15 mLs by Per Feeding Tube route daily (with lunch)    Sharps Container (BD SHARPS ) MISC 1 Container as needed    insulin pen needle (BD KEN U/F) 32G X 4 MM Use 4-6 times per day    Alcohol Swabs PADS 1 pad as needed    blood glucose monitoring (ACCU-CHEK MULTICLIX) lancets Use up to 6 times daily    blood glucose monitoring (ACCU-CHEK EMMY PLUS)  meter device kit 1 kit by In Vitro route every 4 hours Use to test blood sugar 6 times daily or as directed.    Or whatever is covered    blood glucose monitoring (ACCU-CHEK EMMY) test strip Use to test blood sugar 6 times daily or as directed.    blood glucose monitoring (PAT CONTOUR NEXT) test strip Use to test blood sugar 6-8 times daily or as directed.    blood glucose monitoring (PAT MICROLET) lancets Use to test blood sugar 6-8 times daily or as directed.        Physical exam:   General Appearance: in no apparent distress.   Temp:  [97.9  F (36.6  C)] 97.9  F (36.6  C)  Pulse:  [93] 93  Resp:  [14] 14  BP: (107)/(66) 107/66 mmHg  SpO2:  [97 %] 97 %   Skin: Normal, no rashes or jaundice  Heart: Regular rhythm.  Lungs: no audible wheezes or increased work of breathing.  Abdomen: The abdomen is rounded, and the wound is Healing well, without hernia.  Tube exit site is clean. The abdomen is non-tender.    Edema: absent.    Chronic Pancreatitis Latest Ref Rng 1/7/2017 1/8/2017 1/9/2017 1/11/2017 1/17/2017   Weight - 67.813 kg 66.951 kg 65.137 kg - -   AMYLASE 30 - 110 U/L - - 28(L) - -   LIPASE 73 - 393 U/L - - 36(L) - -   HEMOGLOBIN A1C 4.3 - 6.0 % - - - - -   C PEPTIDE 0.9 - 6.9 ng/mL - - - - -   GLUCOSE 70 - 99 mg/dL - - 144(H) 102(H) 98   HGB 11.7 - 15.7 g/dL - - 11.3(L) 11.1(L) 10.5(L)    - 450 10e9/L - - 1420(HH) 1394(HH) 1234(HH)   ALBUMIN 3.4 - 5.0 g/dL - - - 3.5 3.4   PREALBUMIN 15 - 45 mg/dL - - - - 25       Assessment and Plan: She is doing well s/p TP-IAT.  Interval progress has been good.  Postoperative gastric ileus: resolving.  Clears to full sugar free liquids. Will keep food diary.  GT site treated with silver nitrate for small hypertrophic granulation tissue.  Will change 2x2 daily or more often to promote dryness.   Zipline removed.   Pancreatic exocrine insufficiency: well controlled but sticky stools.  Continue same creon with TF but take 1-2 oral with po intake  Alkaline  phosphatase: elevated, unclear etiology.  Appears to be plateauing, will follow.  Malnutrition: not present.  Diabetes mellitus: well controlled, appreciate endocrine recs  Abdominal pain management: will change dilaudid to oxycodone for better coverage (based on past experience).  5 mg per dose, Q4-6 prn.  Followup: I will see her again in clinic in 2  weeks    Total time: 25 min, Counselling Time: 20 min.        Nestor Phoenix MD  Department of Surgery

## 2017-01-17 NOTE — NURSING NOTE
"Chief Complaint   Patient presents with     TP-IAT Transplant     POD 20       Initial /66 mmHg  Pulse 93  Temp(Src) 97.9  F (36.6  C) (Oral)  Resp 14  SpO2 97% Estimated body mass index is 21.84 kg/(m^2) as calculated from the following:    Height as of 12/28/16: 1.727 m (5' 8\").    Weight as of 1/9/17: 65.137 kg (143 lb 9.6 oz).  BP completed using cuff size: regular    "

## 2017-01-18 ENCOUNTER — TELEPHONE (OUTPATIENT)
Dept: TRANSPLANT | Facility: CLINIC | Age: 52
End: 2017-01-18

## 2017-01-18 DIAGNOSIS — Z90.410 POST-PANCREATECTOMY DIABETES (H): Primary | ICD-10-CM

## 2017-01-18 DIAGNOSIS — E89.1 POST-PANCREATECTOMY DIABETES (H): Primary | ICD-10-CM

## 2017-01-18 DIAGNOSIS — E13.9 POST-PANCREATECTOMY DIABETES (H): Primary | ICD-10-CM

## 2017-01-18 NOTE — TELEPHONE ENCOUNTER
Spoke with Dinora. She reports that changing her med from Dilaudid to Oxycodone helped everyone have a much better night's sleep as she was able to move her treatments to every 4 hours for consistency. She stated that her head is more clear with the oxycodone. She sounded very bright and happy as her son just arrived in [facility] from [place]. He has not seen his mother since before her surgery.  She reported that her tube feeding pump malfunctioned again, 5th time, during the night and that she woke up in a pool of formula. She checked her blood sugar and it was in the 70's..   Dinora has consumed 1/4 cup chicken broth and 1 entire bottle of vitamin sugar-free water. We reviewed her carb coverage ratio of 1 unit to every 15 grams of CHO. Dinora has a very good understanding of how to cover her CHO and how to correct an elevated blood sugar reading. She knows to take 1 enzyme with these small bites. She is craving cream of wheat. Encouraged her to try a few bites and see how she does. She understands she can only manage a few bites at a time or she gets too full.   She describes having a huge formed stool today that coated the toilet bowl, was pasty and sticky like an oil slick. Did not float. Was dark green in color.  She is c/o burning around her GJ tube entrance site that was treated with silver nitrate in clinic 1/17/17.

## 2017-01-19 ENCOUNTER — TELEPHONE (OUTPATIENT)
Dept: TRANSPLANT | Facility: CLINIC | Age: 52
End: 2017-01-19

## 2017-01-19 NOTE — TELEPHONE ENCOUNTER
Dinora had a rough night. Terrible night sweats that drenched her. Not like hormonal night sweats. Blood sugars were normal and no fever. Her 8:20 sugar was 146. Will follow her with this. She is c/o significant pain under her GJ site and along the incision.   No N/V.   Large rigoberto-colored with darker rigoberto color sticky, splattery, formed with some loose stool.   Ate 1/2 cup cream of wheat last night. Felt really full. Burping. Bit bloated.   Had a small pudding cup this morning. Had 1 1/2 cups of vitamin water.   Urine darker today.   Pain with oxy is higher last night. 5-6 level. She is thinking of going back on the dilaudid at noon when due. Decided Tylenol not helping.   No itching.   Thinking maybe related to the 12 hour day her Tuesday. Feels exhausted.   Told her to stick with clears and drink small amounts throughout the day.

## 2017-01-19 NOTE — TELEPHONE ENCOUNTER
"Spoke with Dinora. Asked her to increase her Creon from 2 to 3 capsules per every 4 hour tube feed. She verbalized understanding. She has resumed using the Dilaudid as she is feeling this is providing better pain coverage. She reports one diarrhea stool today, that she continues to have the sweating episodes (she is using a heating pad on her tummy). No fever. She is checking her temperature. She reports that she just \"feels yucky\" today. Will follow her.  "

## 2017-01-20 ENCOUNTER — TELEPHONE (OUTPATIENT)
Dept: TRANSPLANT | Facility: CLINIC | Age: 52
End: 2017-01-20

## 2017-01-22 ENCOUNTER — APPOINTMENT (OUTPATIENT)
Dept: GENERAL RADIOLOGY | Facility: CLINIC | Age: 52
End: 2017-01-22
Attending: FAMILY MEDICINE
Payer: COMMERCIAL

## 2017-01-22 ENCOUNTER — APPOINTMENT (OUTPATIENT)
Dept: GENERAL RADIOLOGY | Facility: CLINIC | Age: 52
End: 2017-01-22
Payer: COMMERCIAL

## 2017-01-22 ENCOUNTER — HOSPITAL ENCOUNTER (OUTPATIENT)
Facility: CLINIC | Age: 52
Setting detail: OBSERVATION
Discharge: HOME | End: 2017-01-23
Attending: FAMILY MEDICINE | Admitting: SURGERY
Payer: COMMERCIAL

## 2017-01-22 ENCOUNTER — APPOINTMENT (OUTPATIENT)
Dept: INTERVENTIONAL RADIOLOGY/VASCULAR | Facility: CLINIC | Age: 52
End: 2017-01-22
Attending: RADIOLOGY
Payer: COMMERCIAL

## 2017-01-22 DIAGNOSIS — Z78.9 ENCOUNTER FOR GASTROJEJUNAL (GJ) TUBE PLACEMENT: ICD-10-CM

## 2017-01-22 DIAGNOSIS — E13.9 POST-PANCREATECTOMY DIABETES (H): Primary | ICD-10-CM

## 2017-01-22 DIAGNOSIS — E89.1 POST-PANCREATECTOMY DIABETES (H): Primary | ICD-10-CM

## 2017-01-22 DIAGNOSIS — Z90.410 POST-PANCREATECTOMY DIABETES (H): Primary | ICD-10-CM

## 2017-01-22 DIAGNOSIS — Z90.410 ACQUIRED TOTAL ABSENCE OF PANCREAS: ICD-10-CM

## 2017-01-22 PROBLEM — K94.13 MALFUNCTIONING JEJUNOSTOMY TUBE (H): Status: ACTIVE | Noted: 2017-01-22

## 2017-01-22 LAB
ALBUMIN SERPL-MCNC: 3.3 G/DL (ref 3.4–5)
ALBUMIN UR-MCNC: NEGATIVE MG/DL
ALP SERPL-CCNC: 427 U/L (ref 40–150)
ALT SERPL W P-5'-P-CCNC: 84 U/L (ref 0–50)
AMYLASE SERPL-CCNC: 54 U/L (ref 30–110)
ANION GAP SERPL CALCULATED.3IONS-SCNC: 5 MMOL/L (ref 3–14)
APPEARANCE UR: CLEAR
AST SERPL W P-5'-P-CCNC: 102 U/L (ref 0–45)
BASOPHILS # BLD AUTO: 0.2 10E9/L (ref 0–0.2)
BASOPHILS NFR BLD AUTO: 1.9 %
BILIRUB SERPL-MCNC: 0.3 MG/DL (ref 0.2–1.3)
BILIRUB UR QL STRIP: NEGATIVE
BUN SERPL-MCNC: 23 MG/DL (ref 7–30)
CALCIUM SERPL-MCNC: 8.7 MG/DL (ref 8.5–10.1)
CHLORIDE SERPL-SCNC: 102 MMOL/L (ref 94–109)
CO2 SERPL-SCNC: 31 MMOL/L (ref 20–32)
COLOR UR AUTO: ABNORMAL
CREAT SERPL-MCNC: 0.52 MG/DL (ref 0.52–1.04)
DIFFERENTIAL METHOD BLD: ABNORMAL
EOSINOPHIL # BLD AUTO: 1.5 10E9/L (ref 0–0.7)
EOSINOPHIL NFR BLD AUTO: 18.9 %
ERYTHROCYTE [DISTWIDTH] IN BLOOD BY AUTOMATED COUNT: 13.4 % (ref 10–15)
GFR SERPL CREATININE-BSD FRML MDRD: ABNORMAL ML/MIN/1.7M2
GLUCOSE BLDC GLUCOMTR-MCNC: 107 MG/DL (ref 70–99)
GLUCOSE BLDC GLUCOMTR-MCNC: 108 MG/DL (ref 70–99)
GLUCOSE BLDC GLUCOMTR-MCNC: 112 MG/DL (ref 70–99)
GLUCOSE BLDC GLUCOMTR-MCNC: 116 MG/DL (ref 70–99)
GLUCOSE BLDC GLUCOMTR-MCNC: 62 MG/DL (ref 70–99)
GLUCOSE BLDC GLUCOMTR-MCNC: 64 MG/DL (ref 70–99)
GLUCOSE BLDC GLUCOMTR-MCNC: 67 MG/DL (ref 70–99)
GLUCOSE SERPL-MCNC: 95 MG/DL (ref 70–99)
GLUCOSE UR STRIP-MCNC: NEGATIVE MG/DL
HCT VFR BLD AUTO: 35.2 % (ref 35–47)
HGB BLD-MCNC: 11.2 G/DL (ref 11.7–15.7)
HGB UR QL STRIP: NEGATIVE
IMM GRANULOCYTES # BLD: 0 10E9/L (ref 0–0.4)
IMM GRANULOCYTES NFR BLD: 0.1 %
KETONES UR STRIP-MCNC: NEGATIVE MG/DL
LEUKOCYTE ESTERASE UR QL STRIP: NEGATIVE
LIPASE SERPL-CCNC: 32 U/L (ref 73–393)
LYMPHOCYTES # BLD AUTO: 2.6 10E9/L (ref 0.8–5.3)
LYMPHOCYTES NFR BLD AUTO: 33.7 %
MAGNESIUM SERPL-MCNC: 2.4 MG/DL (ref 1.6–2.3)
MCH RBC QN AUTO: 28.6 PG (ref 26.5–33)
MCHC RBC AUTO-ENTMCNC: 31.8 G/DL (ref 31.5–36.5)
MCV RBC AUTO: 90 FL (ref 78–100)
MONOCYTES # BLD AUTO: 1 10E9/L (ref 0–1.3)
MONOCYTES NFR BLD AUTO: 12.4 %
NEUTROPHILS # BLD AUTO: 2.6 10E9/L (ref 1.6–8.3)
NEUTROPHILS NFR BLD AUTO: 33 %
NITRATE UR QL: NEGATIVE
NRBC # BLD AUTO: 0 10*3/UL
NRBC BLD AUTO-RTO: 0 /100
PH UR STRIP: 8 PH (ref 5–7)
PLATELET # BLD AUTO: 834 10E9/L (ref 150–450)
POTASSIUM SERPL-SCNC: 4 MMOL/L (ref 3.4–5.3)
PROT SERPL-MCNC: 7.3 G/DL (ref 6.8–8.8)
RBC # BLD AUTO: 3.91 10E12/L (ref 3.8–5.2)
SODIUM SERPL-SCNC: 138 MMOL/L (ref 133–144)
SP GR UR STRIP: 1.01 (ref 1–1.03)
URN SPEC COLLECT METH UR: ABNORMAL
UROBILINOGEN UR STRIP-MCNC: NORMAL MG/DL (ref 0–2)
WBC # BLD AUTO: 7.8 10E9/L (ref 4–11)

## 2017-01-22 PROCEDURE — 49452 REPLACE G-J TUBE PERC: CPT

## 2017-01-22 PROCEDURE — G0378 HOSPITAL OBSERVATION PER HR: HCPCS

## 2017-01-22 PROCEDURE — 83690 ASSAY OF LIPASE: CPT | Performed by: FAMILY MEDICINE

## 2017-01-22 PROCEDURE — 81003 URINALYSIS AUTO W/O SCOPE: CPT | Performed by: FAMILY MEDICINE

## 2017-01-22 PROCEDURE — 82150 ASSAY OF AMYLASE: CPT | Performed by: FAMILY MEDICINE

## 2017-01-22 PROCEDURE — C1769 GUIDE WIRE: HCPCS

## 2017-01-22 PROCEDURE — 80053 COMPREHEN METABOLIC PANEL: CPT | Performed by: FAMILY MEDICINE

## 2017-01-22 PROCEDURE — 25800025 ZZH RX 258: Performed by: STUDENT IN AN ORGANIZED HEALTH CARE EDUCATION/TRAINING PROGRAM

## 2017-01-22 PROCEDURE — 00000146 ZZHCL STATISTIC GLUCOSE BY METER IP

## 2017-01-22 PROCEDURE — 99285 EMERGENCY DEPT VISIT HI MDM: CPT | Mod: Z6 | Performed by: FAMILY MEDICINE

## 2017-01-22 PROCEDURE — 25000125 ZZHC RX 250: Performed by: STUDENT IN AN ORGANIZED HEALTH CARE EDUCATION/TRAINING PROGRAM

## 2017-01-22 PROCEDURE — C1887 CATHETER, GUIDING: HCPCS

## 2017-01-22 PROCEDURE — 96376 TX/PRO/DX INJ SAME DRUG ADON: CPT | Performed by: FAMILY MEDICINE

## 2017-01-22 PROCEDURE — 27210806 ZZH SHEATH CR5

## 2017-01-22 PROCEDURE — 27210905 ZZH KIT CR7

## 2017-01-22 PROCEDURE — 74000 XR ABDOMEN 1 VW: CPT

## 2017-01-22 PROCEDURE — 85025 COMPLETE CBC W/AUTO DIFF WBC: CPT | Performed by: FAMILY MEDICINE

## 2017-01-22 PROCEDURE — 27210995 ZZH RX 272: Performed by: RADIOLOGY

## 2017-01-22 PROCEDURE — 96374 THER/PROPH/DIAG INJ IV PUSH: CPT | Performed by: FAMILY MEDICINE

## 2017-01-22 PROCEDURE — 96361 HYDRATE IV INFUSION ADD-ON: CPT | Performed by: FAMILY MEDICINE

## 2017-01-22 PROCEDURE — 40000940 XR ABDOMEN PORT F1 VW

## 2017-01-22 PROCEDURE — 25000125 ZZHC RX 250: Performed by: RADIOLOGY

## 2017-01-22 PROCEDURE — 25000128 H RX IP 250 OP 636: Performed by: FAMILY MEDICINE

## 2017-01-22 PROCEDURE — 27210814 ZZ H TUBE GASTRO CR12

## 2017-01-22 PROCEDURE — 25000132 ZZH RX MED GY IP 250 OP 250 PS 637: Performed by: STUDENT IN AN ORGANIZED HEALTH CARE EDUCATION/TRAINING PROGRAM

## 2017-01-22 PROCEDURE — 99285 EMERGENCY DEPT VISIT HI MDM: CPT | Mod: 25 | Performed by: FAMILY MEDICINE

## 2017-01-22 PROCEDURE — 83735 ASSAY OF MAGNESIUM: CPT | Performed by: FAMILY MEDICINE

## 2017-01-22 PROCEDURE — 25000125 ZZHC RX 250: Performed by: FAMILY MEDICINE

## 2017-01-22 RX ORDER — NICOTINE POLACRILEX 4 MG
15-30 LOZENGE BUCCAL
Status: DISCONTINUED | OUTPATIENT
Start: 2017-01-22 | End: 2017-01-23

## 2017-01-22 RX ORDER — AMITRIPTYLINE HCL 25 MG
20 TABLET ORAL AT BEDTIME
Status: DISCONTINUED | OUTPATIENT
Start: 2017-01-22 | End: 2017-01-23 | Stop reason: HOSPADM

## 2017-01-22 RX ORDER — HYDROMORPHONE HYDROCHLORIDE 1 MG/ML
0.5 INJECTION, SOLUTION INTRAMUSCULAR; INTRAVENOUS; SUBCUTANEOUS
Status: COMPLETED | OUTPATIENT
Start: 2017-01-22 | End: 2017-01-22

## 2017-01-22 RX ORDER — SODIUM CHLORIDE 9 MG/ML
1000 INJECTION, SOLUTION INTRAVENOUS CONTINUOUS
Status: DISCONTINUED | OUTPATIENT
Start: 2017-01-22 | End: 2017-01-22

## 2017-01-22 RX ORDER — LORAZEPAM 2 MG/ML
0.5 CONCENTRATE ORAL EVERY 6 HOURS PRN
Status: DISCONTINUED | OUTPATIENT
Start: 2017-01-22 | End: 2017-01-23 | Stop reason: HOSPADM

## 2017-01-22 RX ORDER — ASPIRIN 325 MG
325 TABLET ORAL DAILY
Status: DISCONTINUED | OUTPATIENT
Start: 2017-01-22 | End: 2017-01-23 | Stop reason: HOSPADM

## 2017-01-22 RX ORDER — DEXTROSE MONOHYDRATE 25 G/50ML
25-50 INJECTION, SOLUTION INTRAVENOUS
Status: DISCONTINUED | OUTPATIENT
Start: 2017-01-22 | End: 2017-01-23 | Stop reason: HOSPADM

## 2017-01-22 RX ORDER — PREGABALIN 20 MG/ML
50 SOLUTION ORAL 2 TIMES DAILY
Status: DISCONTINUED | OUTPATIENT
Start: 2017-01-22 | End: 2017-01-23 | Stop reason: HOSPADM

## 2017-01-22 RX ORDER — DEXTROSE MONOHYDRATE 100 MG/ML
INJECTION, SOLUTION INTRAVENOUS CONTINUOUS
Status: DISCONTINUED | OUTPATIENT
Start: 2017-01-22 | End: 2017-01-23 | Stop reason: HOSPADM

## 2017-01-22 RX ORDER — SODIUM BICARBONATE 325 MG/1
325 TABLET ORAL EVERY 4 HOURS
Status: DISCONTINUED | OUTPATIENT
Start: 2017-01-22 | End: 2017-01-23 | Stop reason: HOSPADM

## 2017-01-22 RX ORDER — LIDOCAINE 40 MG/G
CREAM TOPICAL
Status: DISCONTINUED | OUTPATIENT
Start: 2017-01-22 | End: 2017-01-23 | Stop reason: HOSPADM

## 2017-01-22 RX ORDER — SODIUM CHLORIDE 9 MG/ML
INJECTION, SOLUTION INTRAVENOUS CONTINUOUS
Status: DISCONTINUED | OUTPATIENT
Start: 2017-01-22 | End: 2017-01-22

## 2017-01-22 RX ORDER — OXYCODONE HYDROCHLORIDE 5 MG/1
5 TABLET ORAL EVERY 4 HOURS PRN
Status: DISCONTINUED | OUTPATIENT
Start: 2017-01-22 | End: 2017-01-23 | Stop reason: HOSPADM

## 2017-01-22 RX ORDER — HYDROMORPHONE HYDROCHLORIDE 1 MG/ML
3 SOLUTION ORAL
Status: DISCONTINUED | OUTPATIENT
Start: 2017-01-22 | End: 2017-01-23 | Stop reason: HOSPADM

## 2017-01-22 RX ORDER — NALOXONE HYDROCHLORIDE 0.4 MG/ML
.1-.4 INJECTION, SOLUTION INTRAMUSCULAR; INTRAVENOUS; SUBCUTANEOUS
Status: DISCONTINUED | OUTPATIENT
Start: 2017-01-22 | End: 2017-01-23 | Stop reason: HOSPADM

## 2017-01-22 RX ORDER — PROCHLORPERAZINE MALEATE 5 MG
5 TABLET ORAL EVERY 6 HOURS
Status: DISCONTINUED | OUTPATIENT
Start: 2017-01-22 | End: 2017-01-23 | Stop reason: HOSPADM

## 2017-01-22 RX ORDER — NICOTINE POLACRILEX 4 MG
15-30 LOZENGE BUCCAL
Status: DISCONTINUED | OUTPATIENT
Start: 2017-01-22 | End: 2017-01-23 | Stop reason: HOSPADM

## 2017-01-22 RX ADMIN — SODIUM CHLORIDE 1000 ML: 9 INJECTION, SOLUTION INTRAVENOUS at 16:18

## 2017-01-22 RX ADMIN — PANTOPRAZOLE SODIUM 40 MG: 40 TABLET, DELAYED RELEASE ORAL at 21:47

## 2017-01-22 RX ADMIN — DEXTROSE 15 G: 15 GEL ORAL at 20:04

## 2017-01-22 RX ADMIN — HYDROMORPHONE HYDROCHLORIDE 0.5 MG: 10 INJECTION, SOLUTION INTRAMUSCULAR; INTRAVENOUS; SUBCUTANEOUS at 18:22

## 2017-01-22 RX ADMIN — LIDOCAINE HYDROCHLORIDE 10 ML: 20 JELLY TOPICAL at 18:08

## 2017-01-22 RX ADMIN — DEXTROSE MONOHYDRATE 25 ML: 500 INJECTION PARENTERAL at 21:30

## 2017-01-22 RX ADMIN — LORAZEPAM 0.5 MG: 2 SOLUTION, CONCENTRATE ORAL at 21:24

## 2017-01-22 RX ADMIN — LIDOCAINE HYDROCHLORIDE 8 ML: 10 INJECTION, SOLUTION EPIDURAL; INFILTRATION; INTRACAUDAL; PERINEURAL at 18:08

## 2017-01-22 RX ADMIN — HYDROMORPHONE HYDROCHLORIDE 0.5 MG: 10 INJECTION, SOLUTION INTRAMUSCULAR; INTRAVENOUS; SUBCUTANEOUS at 16:17

## 2017-01-22 RX ADMIN — ASPIRIN 325 MG ORAL TABLET 325 MG: 325 PILL ORAL at 21:45

## 2017-01-22 RX ADMIN — HYDROMORPHONE HYDROCHLORIDE 0.5 MG: 10 INJECTION, SOLUTION INTRAMUSCULAR; INTRAVENOUS; SUBCUTANEOUS at 20:04

## 2017-01-22 RX ADMIN — Medication 20 MG: at 22:33

## 2017-01-22 RX ADMIN — DEXTROSE MONOHYDRATE: 100 INJECTION, SOLUTION INTRAVENOUS at 23:25

## 2017-01-22 RX ADMIN — DIATRIZOATE MEGLUMINE AND DIATRIZOATE SODIUM 30 ML: 660; 100 SOLUTION ORAL; RECTAL at 20:47

## 2017-01-22 RX ADMIN — PREGABALIN 50 MG: 20 SOLUTION ORAL at 22:32

## 2017-01-22 RX ADMIN — DEXTROSE MONOHYDRATE: 100 INJECTION, SOLUTION INTRAVENOUS at 22:38

## 2017-01-22 RX ADMIN — DEXTROSE AND SODIUM CHLORIDE: 5; 900 INJECTION, SOLUTION INTRAVENOUS at 22:17

## 2017-01-22 ASSESSMENT — ENCOUNTER SYMPTOMS
EYE REDNESS: 0
FEVER: 0
FLANK PAIN: 0
COLOR CHANGE: 0
ABDOMINAL PAIN: 1
DECREASED CONCENTRATION: 0
DIFFICULTY URINATING: 0
VOMITING: 0
NECK STIFFNESS: 0
NAUSEA: 0
SHORTNESS OF BREATH: 0
WOUND: 1
HEADACHES: 0
BRUISES/BLEEDS EASILY: 0
ARTHRALGIAS: 0
CONFUSION: 0
DYSPHORIC MOOD: 0

## 2017-01-22 NOTE — ED NOTES
Arrived to ED d/t need for GJ tube placement check, woke up choking on her tube feeding formula, 3 weeks post-op pancreatectomy and islet cell txp, having abdominal pain, she is followed by Dr. Phoenix and Xavier, was directed to come to ED, VSS upon arrival

## 2017-01-22 NOTE — PROGRESS NOTES
Interventional Radiology Intra-procedural Nursing Note    Patient Name: Dinora Mcghee  Medical Record Number: [id number]  Today's Date: January 22, 2017    Start Time:  1705  End of procedure time:  1811  Procedure:  Gastrojejunostomy tube change  Report given to:  KACEY Bolivar RN  Time pt departs:   1815  :  N/A    Other Notes:   Patient into IR#4 at 1645. Informed consent obtained for procedure by MD Rosalinda.  Patient prepped and draped in supine position with 2% Chlorhexidine.  GJ tube changed by MD.  No sedation used.  8cc injectible Lido used for procedure.  16 Fr tube placed instead of 18 Fr.  Transported back to ED at 1815 by ED staff in stable condition.     Yuliya العلي, RN, BSN, CCRN

## 2017-01-22 NOTE — PHARMACY-ADMISSION MEDICATION HISTORY
"Admission medication history interview status for the 1/22/2017 admission is complete. See Epic admission navigator for allergy information, pharmacy, prior to admission medications and immunization status.     Medication history interview sources:  Patient    Changes made to PTA medication list (reason)  Added: None  Deleted: Multivitamin (Certovite) liquid. Patient confirms she is taking solid form only.  Changed: Lorazepam 2 mg/mL solution - changed from \"by mouth\" to \"Per J-Tube\"    Additional medication history information (including reliability of information, actions taken by pharmacist):  Patient was alert, pleasant to speak with, and an excellent historian of her medications.     Allergy information lists prochlorperazine as an allergy, but also a PTA medication. She states she had an unpleasant twitching reaction but has been using it since without issues, though she sometimes needs to take it with diphenhydramine.    Patient is part of a 2-year study with etanercept 25 mg/0.5 mL sc injections. Study is managed by her endocrinologist, Dr. Gloria Melissa, [phone]. Last dose received was Tuesday, 1/17/17.    Prior to Admission medications    Medication Sig Last Dose Taking? Auth Provider   insulin glargine (LANTUS) 100 UNIT/ML injection Inject 24 units subcutaneously every day at 8:00 AM. 1/21/2017 at am Yes Gloria Melissa MD   amitriptyline (ELAVIL) 2 MG/ML solution 10 mLs (20 mg) by Per J Tube route At Bedtime 1/21/2017 at 2130 Yes Nestor Phoenix MD   levothyroxine (SYNTHROID) 25 mcg/mL 5 mLs (125 mcg) by Per J Tube route every morning (before breakfast) 1/22/2017 at am Yes Nestor Phoenix MD   oxyCODONE (ROXICODONE) 5 MG IR tablet Take 1 tablet (5 mg) by mouth every 4 hours as needed for moderate to severe pain 1/22/2017 at 1330 Yes Nestor Phoenix MD   pantoprazole (PROTONIX) 2 mg/mL suspension 20 mLs (40 mg) by Per J Tube route 2 times daily 1/22/2017 at am Yes Nestor Phoenix MD   sodium " bicarbonate 325 MG tablet 1 tablet (325 mg) by Per J Tube route every 4 hours 1/22/2017 at am Yes Nestor Phoenix MD   pregabalin (LYRICA) 20 MG/ML solution 2.5 mLs (50 mg) by Per J Tube route 2 times daily 1/22/2017 at am Yes Nestor Phoenix MD   sennosides (SENOKOT) 8.8 MG/5ML syrup 10 mLs by Per Feeding Tube route every morning 1/21/2017 at pm Yes Nestor Phoenix MD   docusate (COLACE) 50 MG/5ML liquid 5 mLs (50 mg) by Per J Tube route daily 1/21/2017 at pm Yes Nestor Phoenix MD   aspirin 325 MG tablet Take 1 tablet (325 mg) by mouth daily Take 1 aspirin, 325 mg. Crush the aspirin and administer via jejunal tube. Flush with 30 cc of free water. 1/22/2017 at Unknown time Yes Nestor Phoenix MD   magnesium hydroxide (MILK OF MAGNESIA) 400 MG/5ML suspension 30 mLs by Per J Tube route daily 1/22/2017 at am Yes Nestor Phoenix MD   HYDROmorphone (DILAUDID) 1 MG/ML LIQD liquid 3 mLs (3 mg) by Per Feeding Tube route every 3 hours Past Week at ~2 days Yes Barry Bennett MD   LORazepam (ATIVAN) 2 MG/ML (HIGH CONC) solution Take 0.25 mLs (0.5 mg) by mouth every 6 hours as needed for anxiety, nausea or vomiting  Patient taking differently: 0.5 mg by Per J Tube route every 6 hours as needed for anxiety, nausea or vomiting  1/21/2017 at pm Yes Barry Bennett MD   amylase-lipase-protease (CREON 24) 91758 UNITS CPEP per EC capsule 1-2 capsules (24,000-48,000 Units) by Per J Tube route every 4 hours  Patient taking differently: 3 capsules by Per J Tube route 3 times daily (with meals)  1/22/2017 at am Yes Melissa Sood PA-C   promethazine (PHENERGAN) 6.25 MG/5ML syrup 10 mLs (12.5 mg) by Per Feeding Tube route every 4 hours as needed for nausea Past Week at ~5 days Yes Melissa Sood PA-C   insulin aspart (NOVOLOG PEN) 100 UNIT/ML injection Inject 1-6 Units Subcutaneous every 4 hours Insulin to carbohydrate ratio: 1 unit per 15 grams CHO.   Gloria Melissa MD   multivitamins CF formula (MVW COMPLETE  FORMULATION) CAPS capsule Take 1 capsule by mouth daily   Nestor Phoenix MD   study - etanercept (IDS# 4454) 25 MG/0.5ML subcutaneous injection Inject 25 mg Subcutaneous once for 1 dose On 1/17/17, day 21 dose   Gloria Melissa MD   glucose 40 % GEL gel Take 15-30 g by mouth every 15 minutes as needed for low blood sugar Unknown at never  Melissa Sood PA-C   prochlorperazine (COMPAZINE) 5 MG tablet 1 tablet (5 mg) by Per J Tube route every 6 hours  Patient taking differently: 5 mg by Per J Tube route every 6 hours as needed    Melissa Sood PA-C   Sharps Container (BD SHARPS ) MISC 1 Container as needed   Melissa Sood PA-C   insulin pen needle (BD KEN U/F) 32G X 4 MM Use 4-6 times per day   Melissa Sood PA-C   blood glucose monitoring (ACCU-CHEK MULTICLIX) lancets Use up to 6 times daily   Melissa Sood PA-C   blood glucose monitoring (ACCU-CHEK EMMY PLUS) meter device kit 1 kit by In Vitro route every 4 hours Use to test blood sugar 6 times daily or as directed.    Or whatever is covered   Melissa Sood PA-C   blood glucose monitoring (ACCU-CHEK EMMY) test strip Use to test blood sugar 6 times daily or as directed.   Melissa Sood PA-C   blood glucose monitoring (PAT CONTOUR NEXT) test strip Use to test blood sugar 6-8 times daily or as directed.   Nestor Phoenix MD   blood glucose monitoring (PAT MICROLET) lancets Use to test blood sugar 6-8 times daily or as directed.   Nestor Phoenix MD         Medication history completed by: Darwin العلي PD3, Pharmacy Student

## 2017-01-22 NOTE — LETTER
Transition Communication Hand-off for Care Transitions to Next Level of Care Provider    Name: Dinora Mcghee  MRN #: [id number]  Primary Care Provider: Justyna Lawson     [facility] [address]  [place] [address]     Reason for Hospitalization:  Encounter for gastrojejunal (GJ) tube placement [Z78.9]  Admit Date/Time: 1/22/2017 12:51 PM  Discharge Date: 1.23.17  Payor Source: Payor: BCBS / Plan: [place] / Product Type: Indemnity /       Reason for Communication Hand-off Referral: Other see dx    Discharge Plan:       Concern for non-adherence with plan of care:   Y/N n  Discharge Needs Assessment:        Follow-up specialty is recommended: Yes    Follow-up plan:  Future Appointments  Date Time Provider Department Center   1/31/2017 2:40 PM Nestor Phoenix MD [facility]       Any outstanding tests or procedures:        Referrals     Future Labs/Procedures    Home infusion referral     Comments:    Resumption       Your provider has referred you to: [facility]: [facility] [phone]   [email or web address]  Fax: [phone]  Enteral Feeds    Local Address (if different from home address): N/A    Anticipated Length of Therapy: 2-8 weeks    Home Infusion Pharmacist to adjust therapy based on labs and clinical assessments: No (Home Infusion will call for order)    Labs:  May draw labs from Venous Catheter: No  Home Infusion Pharmacist to order labs based on therapy type and clinical assessments: No     Call/Fax Lab Results to: Outpatient Care Coordinator: Mecca Watkins    Main Ph: [phone]  Ph: [phone]    Agency Staff to assess nursing needs for Infusion Therapy.    Access Device Management:  IV Access Type: n/a  Flush with Heparin and Normal Saline IVP PRN and routine site care (per agency protocol) to maintain access device? No    GJT     Tanya IBANEZ   Main Ph: [phone]  [facility] referral coordinator:  [phone]  Fax: [phone]    RN skilled nursing visits  RN to assess vital signs, nutrition, hydration, elimination, respiratory status  RN to provide continued diabetic education  RN to provide oversight with enteral feeds, medication administration via JT  RN to assess pain management and assist with follow up appointments  RN to provide lab draws, per MD orders, and report results to Outpatient Care Coordinator: Mecca Watkins--see # above               Iraheta Recommendations:    Adult [facility] Follow-up and recommended labs and tests       Follow up with labs and appointments as previously scheduled.   Diabetic eduction will be rescheduled.                Anitra Soria    AVS/Discharge Summary is the source of truth; this is a helpful guide for improved communication of patient story

## 2017-01-22 NOTE — ED PROVIDER NOTES
History     Chief Complaint   Patient presents with     Gtube Problem     HPI  Dinora Mcghee is a 51 year old female who Presents emergency room for concern of GJ tube placement.  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  Patient has had this exchange and replaced because of this they believe last time in October.  Patient noted today having some abdominal pain but noted with feedings that she was choking on formula and also when they drained the G portion of the tube formula was noted concern therefore that the end of the tube is migrated back into the stomach and now presents here for an xray.  Patient really denies any shortness of breath no fevers  His incision is healing well in the mid abdomen from her recent surgery 3 weeks ago.      I have reviewed the Medications, Allergies, Past Medical and Surgical History, and Social History in the Epic system.    Review of Systems   Constitutional: Negative for fever.   HENT: Negative for congestion.    Eyes: Negative for redness.   Respiratory: Negative for shortness of breath.    Cardiovascular: Negative for chest pain.   Gastrointestinal: Positive for abdominal pain (patient with tenderness at GJ tube site without erythema or drainage.). Negative for nausea and vomiting.   Genitourinary: Negative for flank pain and difficulty urinating.   Musculoskeletal: Negative for arthralgias, gait problem and neck stiffness.   Skin: Positive for wound (Incision healing). Negative for color change.   Neurological: Negative for headaches.   Hematological: Does not bruise/bleed easily.   Psychiatric/Behavioral: Negative for confusion, dysphoric mood and decreased concentration.   All other systems reviewed and are negative.      Physical Exam   BP: 109/69 mmHg  Pulse: 102  Temp: 98.2  F (36.8  C)  Resp: 16  Weight: 63.504 kg (140 lb)  SpO2: 99 %  Physical Exam    Constitutional: She is oriented to person, place, and time. She appears well-developed and well-nourished. She appears distressed.   Patient pleasant here with  sitting up in no respiratory distress alert and oriented not confused   HENT:   Head: Normocephalic and atraumatic.   Eyes: Conjunctivae and EOM are normal. Pupils are equal, round, and reactive to light. No scleral icterus.   Neck: Normal range of motion. Neck supple.   Cardiovascular: Regular rhythm.    Pulmonary/Chest: No respiratory distress.   Abdominal: She exhibits no distension and no mass. There is tenderness (Minimal tenderness to the abdomen is soft midline incision healing very well mild tenderness over the GJ tube site on the left but no drainage). There is no guarding.   Musculoskeletal: She exhibits no edema or tenderness.   Neurological: She is alert and oriented to person, place, and time. She has normal reflexes. No cranial nerve deficit. Coordination normal.   Skin: Skin is warm and dry. No rash noted. She is not diaphoretic. No erythema. No pallor.   Psychiatric: She has a normal mood and affect. Her behavior is normal. Judgment and thought content normal.   Nursing note and vitals reviewed.      ED Course     [unfilled]  Procedures           In the ER initially talked interventional radiology they were unable to do the procedure initially today I talked to the pancreas transplant fellow agreed at this point patient needs continued hydration and receives most of her supplementations through the GJ tube.  IV established fluids given normal saline bolus in the ER Dilaudid IV for pain as needed Zofran for nausea.    Laboratory testing done glucose 116 urinalysis negative lipase 32 amylase 54 ALT 84  alk phos 427 total bili 0.3.  White count 7.8 hemoglobin 11.2 platelets a 34.  Patient be admitted to the transfer service.  Family and patient agree with plan.    Noted then later that interventional radiology was able to  reposition the tubeand patient to return back to the ER.  Patient noted a fair amount of discomfort as reported there was a fair amount of inflammation she agrees staying overnight is most appropriate this point with her diabetes etc. We did give her a popsicle to eat initially right now  I talked to the transplant fellow again he is awarethat the tube has been replaced/reposition but with fair amount of pain etc. Patient will be admitted and continue to monitor her symptoms.  Patient and  agree with plan.     Patient Evaluated in the ER.  X-ray ordered.    Critical Care time:  none               Labs Ordered and Resulted from Time of ED Arrival Up to the Time of Departure from the ED   GLUCOSE BY METER - Abnormal; Notable for the following:     Glucose 116 (*)     All other components within normal limits   CBC WITH PLATELETS DIFFERENTIAL - Abnormal; Notable for the following:     Hemoglobin 11.2 (*)     Platelet Count 834 (*)     Absolute Eosinophils 1.5 (*)     All other components within normal limits   COMPREHENSIVE METABOLIC PANEL - Abnormal; Notable for the following:     Albumin 3.3 (*)     Alkaline Phosphatase 427 (*)     ALT 84 (*)      (*)     All other components within normal limits   MAGNESIUM - Abnormal; Notable for the following:     Magnesium 2.4 (*)     All other components within normal limits   LIPASE - Abnormal; Notable for the following:     Lipase 32 (*)     All other components within normal limits   UA MACROSCOPIC WITH REFLEX TO MICRO AND CULTURE - Abnormal; Notable for the following:     pH Urine 8.0 (*)     All other components within normal limits   AMYLASE   BASIC METABOLIC PANEL   MAGNESIUM   PHOSPHORUS   CBC WITH PLATELETS DIFFERENTIAL   GLUCOSE MONITOR NURSING POCT   GLUCOSE MONITOR NURSING POCT   PERIPHERAL IV CATHETER   IP ASSIGN PROVIDER TEAM TO TREATMENT TEAM   OBSERVATION GOALS   ASSESS   MEASURE HEIGHT AND WEIGHT   VITAL SIGNS   ACTIVITY   PATIENT EDUCATION    ASSESS FOR HYPOGLYCEMIA SYMPTOMS     Results for orders placed or performed during the hospital encounter of 01/22/17 (from the past 24 hour(s))   Glucose by meter   Result Value Ref Range    Glucose 116 (H) 70 - 99 mg/dL   XR Abdomen 1 View    Narrative    XR ABDOMEN 1 VW  1/22/2017 2:44 PM      HISTORY: gj tube now with formula in stomach  eval for coiling of GJ  in stomach    COMPARISON: 1/2/2017    FINDINGS: GJ tube coiled in the stomach with tip projecting over the  pylorus. Postsurgical changes with surgical clips over the upper  abdomen. Nonobstructive bowel gas pattern with air in the rectum. No  free air.      Impression    IMPRESSION:    GJ tube with tip projecting over the area of the pylorus.  Coiling in  the stomach. Recommend repositioning.    I have personally reviewed the examination and initial interpretation  and I agree with the findings.    RK TINSLEY MD   CBC with platelets differential   Result Value Ref Range    WBC 7.8 4.0 - 11.0 10e9/L    RBC Count 3.91 3.8 - 5.2 10e12/L    Hemoglobin 11.2 (L) 11.7 - 15.7 g/dL    Hematocrit 35.2 35.0 - 47.0 %    MCV 90 78 - 100 fl    MCH 28.6 26.5 - 33.0 pg    MCHC 31.8 31.5 - 36.5 g/dL    RDW 13.4 10.0 - 15.0 %    Platelet Count 834 (H) 150 - 450 10e9/L    Diff Method Automated Method     % Neutrophils 33.0 %    % Lymphocytes 33.7 %    % Monocytes 12.4 %    % Eosinophils 18.9 %    % Basophils 1.9 %    % Immature Granulocytes 0.1 %    Nucleated RBCs 0 0 /100    Absolute Neutrophil 2.6 1.6 - 8.3 10e9/L    Absolute Lymphocytes 2.6 0.8 - 5.3 10e9/L    Absolute Monocytes 1.0 0.0 - 1.3 10e9/L    Absolute Eosinophils 1.5 (H) 0.0 - 0.7 10e9/L    Absolute Basophils 0.2 0.0 - 0.2 10e9/L    Abs Immature Granulocytes 0.0 0 - 0.4 10e9/L    Absolute Nucleated RBC 0.0    Comprehensive metabolic panel   Result Value Ref Range    Sodium 138 133 - 144 mmol/L    Potassium 4.0 3.4 - 5.3 mmol/L    Chloride 102 94 - 109 mmol/L    Carbon Dioxide 31 20 - 32 mmol/L     Anion Gap 5 3 - 14 mmol/L    Glucose 95 70 - 99 mg/dL    Urea Nitrogen 23 7 - 30 mg/dL    Creatinine 0.52 0.52 - 1.04 mg/dL    GFR Estimate >90  Non  GFR Calc   >60 mL/min/1.7m2    GFR Estimate If Black >90   GFR Calc   >60 mL/min/1.7m2    Calcium 8.7 8.5 - 10.1 mg/dL    Bilirubin Total 0.3 0.2 - 1.3 mg/dL    Albumin 3.3 (L) 3.4 - 5.0 g/dL    Protein Total 7.3 6.8 - 8.8 g/dL    Alkaline Phosphatase 427 (H) 40 - 150 U/L    ALT 84 (H) 0 - 50 U/L     (H) 0 - 45 U/L   Magnesium   Result Value Ref Range    Magnesium 2.4 (H) 1.6 - 2.3 mg/dL   Amylase   Result Value Ref Range    Amylase 54 30 - 110 U/L   Lipase   Result Value Ref Range    Lipase 32 (L) 73 - 393 U/L   UA reflex to Microscopic and Culture   Result Value Ref Range    Color Urine Light Yellow     Appearance Urine Clear     Glucose Urine Negative NEG mg/dL    Bilirubin Urine Negative NEG    Ketones Urine Negative NEG mg/dL    Specific Gravity Urine 1.012 1.003 - 1.035    Blood Urine Negative NEG    pH Urine 8.0 (H) 5.0 - 7.0 pH    Protein Albumin Urine Negative NEG mg/dL    Urobilinogen mg/dL Normal 0.0 - 2.0 mg/dL    Nitrite Urine Negative NEG    Leukocyte Esterase Urine Negative NEG    Source Midstream Urine    IR Gastro Jejunostomy Tube Change    Narrative    Procedure 1/22/2017:  Fluoroscopic guided exchange gastrojejunostomy tube    History: Malposition, retracted gastrojejunostomy tube. 1 month status  post total pancreatectomy and on islet cell transplantation.    Comparison: Abdominal x-ray 1/2/2017    Operators: Ortiz STANLEY, Dr. Blue Dowell, performed the entire procedure with the  assistance of Dr. Gilbert.    Medications:   No intravenous sedation administered.     Fluoroscopy time: 20 minutes.    Findings/procedure:   Prior to the procedure both verbal and written informed consent  obtained from the patient. Patient placed supine on the table. The  existing tube retracted into the stomach. The  existing tube prepped  and draped.    Existing tube balloon deflated and removed over wire. The jejunum  catheterize using a combination of a 7 [place] MPA and 5 [place]ian ELSIE 1  catheter is. There was difficulty passing the wire and catheter  through the duodenum, which appeared narrowed, possibly due to edema.    We attempted to place an 18 [place] x 45 cm gastrojejunostomy tube,  however the last 5 to 10 cm of tubing would not track more distal and  would begin to loop into the stomach. 20 [place]vian peel-away sheath  placed and a 16 Latvian x30 cm gastrojejunostomy tube placed. The tip  was at the location of the ligament of Treitz.     New tube balloon inflated and tube secured. Guidewire removed.  Adequate placement of the jejunal and gastric ports documented with  contrast. Tube flushed with saline. Sterile dressing applied. No  immediate complication.      Impression    Impression:  Existing gastrojejunostomy tube retracted and coiled in the stomach.  Difficulty catheterizing past the duodenum which appears slightly  narrow, possibly due to edema. Eventually, a 16 Latvian by 30 cm  transgastric jejunostomy tube placed.    AGUSTÍN GUERRERO   Glucose by meter   Result Value Ref Range    Glucose 67 (L) 70 - 99 mg/dL   Glucose by meter   Result Value Ref Range    Glucose 64 (L) 70 - 99 mg/dL   Glucose by meter   Result Value Ref Range    Glucose 108 (H) 70 - 99 mg/dL         Assessments & Plan (with Medical Decision Making)  51-year-old female who is 3 weeks status post total pancreatectomywith auto islet cell transplant who has a GJ tube now presents as noting choking with some vomiting of the formula and also noting formula coming out of the gastric portion of her GJ tube.  Concern for coiling of the tube.  X-ray done in the ER showing coiling noted I talked interventional radiology initially were unable to today.  Discussed with transplantIV established patient received IV fluids in ER blood sugar initially was around  100 here in the ER.  Planning to admit to transport service and admission radiology was able to reposition the tube [place] of pain still afterwards patient will be admitted their service did take oral popsicle blood sugar initially was in the 60s now 108 patient admitted to the transplant service.  Patient  agree with plan.         I have reviewed the nursing notes.    I have reviewed the findings, diagnosis, plan and need for follow up with the patient.    Current Discharge Medication List          Final diagnoses:   Encounter for gastrojejunal (GJ) tube placement   Acquired total absence of pancreas       1/22/2017   [facility], EMERGENCY DEPARTMENT      This note was created at least in part by the use of dragon voice dictation system. Inadvertent typographical errors may still exist.  Bakari Ray MD.        Bakari Ray MD  01/22/17 [id number]

## 2017-01-22 NOTE — IP AVS SNAPSHOT
MRN:[id number]                      After Visit Summary   1/22/2017    Dinora Mcghee    MRN: [id number]           Thank you!     Thank you for choosing [facility] for your care. Our goal is always to provide you with excellent care. Hearing back from our patients is one way we can continue to improve our services. Please take a few minutes to complete the written survey that you may receive in the mail after you visit with us. Thank you!        Patient Information     Date Of Birth          1965        About your hospital stay     You were admitted on:  January 22, 2017 You last received care in the:  [facility]    You were discharged on:  January 23, 2017        Reason for your hospital stay       Malpositioned gastric jejunostomy tube  16 [place] by 30 cm transgastric jejunostomy tube placed by IR                  Who to Call     For medical emergencies, please call 911.  For non-urgent questions about your medical care, please call your primary care provider or clinic, [phone]          Attending Provider     Provider    Bakari Ray MD Dunn, Nestor Raines MD       Primary Care Provider Office Phone # Fax #    Justyna Lawson [phone]       [facility] [address]  [place] [facility] [address]         When to contact your care team       Please contact transplant coordinator if you have increased nausea or have vomiting, increased abdominal pain or distension, have signs of dehydration-increased thirst, decreased urine output, lightheaded, unable to medications or have a temperature > 100.5.    Your transplant coordinator is Mecca Watkins. Phone: [phone], Fax: [phone]                  After Care Instructions     Activity       Your activity upon discharge: Continue lifting restriction. Ambulate daily.            Diet       Follow this diet upon discharge:   Adult Formula Drip Feeding: Continuous Impact Peptide 1.5; Jejunostomy;  Goal Rate: 60; mL/hr; Medication  Full Liquid Diet  Free water flush before and after medications.            IV access       You are going home with the following vascular access device: Port-a-Cath.            Tubes and drains       You are going home with the following tubes or drains:     -G tube clamped. Vent to gravity if needed due to nausea, vomiting or abdominal pain, distension.   -Continue continuous tube feeding with hourly free water flushes. -Medications per J tube. Flush with 30 cc NS before and after medications.                  Follow-up Appointments     Adult [facility] Follow-up and recommended labs and tests       Follow up with labs and appointments as previously scheduled.  Diabetic eduction will be rescheduled.     Appointments on [facility] and/or [facility] (with [facility] or [facility] provider or service). Call [phone] if you haven't heard regarding these appointments within 7 days of discharge.                  Your next 10 appointments already scheduled     Jan 31, 2017  2:40 PM   (Arrive by 2:25 PM)   Return [place] with Nestor Phoenix MD   [facility] ([facility])    [address] [place] [address] [phone]              Additional Services     Home infusion referral       Resumption       Your provider has referred you to: [facility]: [facility] [place] [phone]   [email or web address]  Fax: [phone]  Enteral Feeds    Local Address (if different from home address): N/A    Anticipated Length of Therapy: 2-8 weeks    Home Infusion Pharmacist to adjust therapy based on labs and clinical assessments: No (Home Infusion will call for order)    Labs:  May draw labs from Venous Catheter: No  Home Infusion Pharmacist to order labs based on therapy type and clinical assessments: No     Call/Fax Lab Results to: Outpatient Care Coordinator: Mecca Rodriguez Ph:  [phone]  Ph: [phone]    Agency Staff to assess nursing needs for Infusion Therapy.    Access Device Management:  IV Access Type: n/a  Flush with Heparin and Normal Saline IVP PRN and routine site care (per agency protocol) to maintain access device? No    [facility]     Tanya    Main Ph: [phone]  [facility] referral coordinator: [phone]  Fax: [phone]    RN skilled nursing visits  RN to assess vital signs, nutrition, hydration, elimination, respiratory status  RN to provide continued diabetic education  RN to provide oversight with enteral feeds, medication administration via JT  RN to assess pain management and assist with follow up appointments  RN to provide lab draws, per MD orders, and report results to Outpatient Care Coordinator: Mecca Watkins--see # above                     Further instructions from your care team       ________________________________________________________  Discharge RN please fax discharge orders to home care agency: [facility]  ________________________________________________________    Tube Feeding Regimen:  Total pump rate = 60 ml/hr     Store daily TF volume in empty milk jug container or closed pitcher in the refrigerator.      Tube Feeding Administration:  Every 4 hours:    1. Pour 240 ml tube feeding to graduated cylinder.    2. Add prepared enzymes (see below) and mix into solution well.   3. Add mixture to the TF bag.     Overnight -> okay to try doubling the above amounts (for an 8 hour increment).  Monitor for bloating, excessive gas, oily/floating diarrhea.     Enzyme preparation:   1) Crush 325 mg sodium bicarbonate and add/mix into 15 ml (1 tbsp) warm water.  2) Open 3 Creon capsules and add beads to the sodium bicarbonate/water solution.  Let this sit for ~30 minutes.  3) When solution completely dissolved (no clumps/chunks), then add and mix well into the tube feeding formula in the bag    Vitamin/Mineral Recommendations:  Multivitamin with minerals  (Certavite) through feeding tube daily  When okay to switch to oral medications -> okay to take oral forms of these medications.     Recommend checking vitamin A, D, E, K and B12 every 1-2 years following surgery due to risk for deficiencies.     Pending Results     No orders found for last 2 day(s).            Statement of Approval     Ordered          01/23/17 1051  I have reviewed and agree with all the recommendations and orders detailed in this document.     Approved and electronically signed by:  Melissa Sood PA-C             Admission Information        Provider Department Dept Phone    1/22/2017 Nestor Phoenix MD [facility] [phone]      Your Vitals Were     Blood Pressure Pulse Temperature Respirations Weight Pulse Oximetry    128/76 mmHg 64 98  F (36.7  C) (Oral) 16 68.13 kg (150 lb 3.2 oz) 100%      MyChart Information     [employer] gives you secure access to your electronic health record. If you see a primary care provider, you can also send messages to your care team and make appointments. If you have questions, please call your primary care clinic.  If you do not have a primary care provider, please call [phone] and they will assist you.        Care EveryWhere ID     This is your Care EveryWhere ID. This could be used by other organizations to access your [facility] medical records  [id number]           Review of your medicines      CONTINUE these medicines which may have CHANGED, or have new prescriptions. If we are uncertain of the size of tablets/capsules you have at home, strength may be listed as something that might have changed.        Dose / Directions    * amylase-lipase-protease 31334 UNITS Cpep per EC capsule   Commonly known as:  CREON 24   Indication:  Pt dissolves 3 capsules per can of food.   This may have changed:    - how much to take  - when to take this   Used for:  Acquired total absence of pancreas        Dose:  3 capsule   3 capsules (72,000 Units) by Per J Tube route 3  times daily (with meals)   Quantity:  168 capsule   Refills:  2       * amylase-lipase-protease 63409 UNITS Cpep per EC capsule   Commonly known as:  CREON 24   This may have changed:  You were already taking a medication with the same name, and this prescription was added. Make sure you understand how and when to take each.   Used for:  Acquired total absence of pancreas        Dose:  1 capsule   Take 1 capsule (24,000 Units) by mouth as needed   Quantity:  180 capsule   Refills:  3       * insulin aspart 100 UNIT/ML injection   Commonly known as:  NovoLOG PEN   This may have changed:    - how much to take  - how to take this  - when to take this   Used for:  Post-pancreatectomy diabetes (H)        Insulin to carbohydrate ratio: 1 unit per 15 grams CHO.   Quantity:  15 mL   Refills:  1       * insulin aspart 100 UNIT/ML injection   Commonly known as:  NovoLOG PEN   This may have changed:  You were already taking a medication with the same name, and this prescription was added. Make sure you understand how and when to take each.   Used for:  Acquired total absence of pancreas        aspart medium correction 1 unit per 50 > 120 every 4 hours ? For Pre-Meal  - 169 give 1 unit.  For Pre-Meal  - 219 give 2 units.  For Pre-Meal  - 269 give 3 units.  For Pre-Meal  - 319 give 4 units.  For Pre-Meal -369 give 5 units. For Pre-Meal BG > 370 give 6 units.   Refills:  3       insulin glargine 100 UNIT/ML injection   Commonly known as:  LANTUS   This may have changed:    - how much to take  - how to take this  - when to take this  - additional instructions   Used for:  Post-pancreatectomy diabetes (H)        Dose:  22 Units   Inject 22 Units Subcutaneous daily   Quantity:  3 mL   Refills:  3       LORazepam 2 MG/ML (HIGH CONC) solution   Commonly known as:  ATIVAN   This may have changed:  how to take this   Used for:  Acquired total absence of pancreas        Dose:  0.5 mg   Take 0.25 mLs (0.5 mg)  by mouth every 6 hours as needed for anxiety, nausea or vomiting   Quantity:  14 mL   Refills:  1       prochlorperazine 5 MG tablet   Commonly known as:  COMPAZINE   This may have changed:    - when to take this  - reasons to take this   Used for:  Acquired total absence of pancreas        Dose:  5 mg   1 tablet (5 mg) by Per J Tube route every 6 hours   Quantity:  56 tablet   Refills:  2       * Notice:  This list has 4 medication(s) that are the same as other medications prescribed for you. Read the directions carefully, and ask your doctor or other care provider to review them with you.      CONTINUE these medicines which have NOT CHANGED        Dose / Directions    amitriptyline 2 MG/ML solution   Commonly known as:  ELAVIL   Used for:  Acquired total absence of pancreas        Dose:  20 mg   10 mLs (20 mg) by Per J Tube route At Bedtime   Quantity:  80 mL   Refills:  2       aspirin 325 MG tablet   Used for:  Elevated platelet count (H), Acquired asplenia, Post-pancreatectomy diabetes (H), History of pancreatectomy, Pancreatic insufficiency        Dose:  325 mg   Take 1 tablet (325 mg) by mouth daily Take 1 aspirin, 325 mg. Crush the aspirin and administer via jejunal tube. Flush with 30 cc of free water.   Quantity:  180 tablet   Refills:  0       BD SHARPS  Misc   Used for:  Post-pancreatectomy diabetes (H)        Dose:  1 Container   1 Container as needed   Quantity:  1 each   Refills:  1       * blood glucose monitoring lancets   Used for:  Acute on chronic pancreatitis (H)        Use to test blood sugar 6-8 times daily or as directed.   Quantity:  1 Box   Refills:  prn       * blood glucose monitoring lancets   Used for:  Acquired total absence of pancreas        Use up to 6 times daily   Quantity:  1 Box   Refills:  3       blood glucose monitoring meter device kit   Used for:  Post-pancreatectomy diabetes (H)        Dose:  1 kit   1 kit by In Vitro route every 4 hours Use to test blood sugar 6  times daily or as directed.  Or whatever is covered   Quantity:  1 kit   Refills:  1       * blood glucose monitoring test strip   Commonly known as:  PAT CONTOUR NEXT   Used for:  Acute on chronic pancreatitis (H)        Use to test blood sugar 6-8 times daily or as directed.   Quantity:  200 strip   Refills:  prn       * blood glucose monitoring test strip   Commonly known as:  ACCU-CHEK EMMY   Used for:  Post-pancreatectomy diabetes (H)        Use to test blood sugar 6 times daily or as directed.   Quantity:  200 strip   Refills:  prn       docusate 50 MG/5ML liquid   Commonly known as:  COLACE   Used for:  Acquired total absence of pancreas        Dose:  50 mg   5 mLs (50 mg) by Per J Tube route daily   Quantity:  280 mL   Refills:  2       glucose 40 % Gel gel   Used for:  Acquired total absence of pancreas        Dose:  15-30 g   Take 15-30 g by mouth every 15 minutes as needed for low blood sugar   Quantity:  3 Tube   Refills:  2       HYDROmorphone 1 MG/ML Liqd liquid   Commonly known as:  DILAUDID   Used for:  Acquired total absence of pancreas        Dose:  3 mg   3 mLs (3 mg) by Per Feeding Tube route every 3 hours   Quantity:  336 mL   Refills:  0       insulin pen needle 32G X 4 MM   Commonly known as:  BD KEN U/F   Used for:  Acquired total absence of pancreas        Use 4-6 times per day   Quantity:  100 each   Refills:  prn       levothyroxine 25 mcg/mL   Commonly known as:  SYNTHROID   Used for:  Acquired total absence of pancreas        Dose:  125 mcg   5 mLs (125 mcg) by Per J Tube route every morning (before breakfast)   Quantity:  70 mL   Refills:  2       magnesium hydroxide 400 MG/5ML suspension   Commonly known as:  MILK OF MAGNESIA   Used for:  Acquired total absence of pancreas        Dose:  30 mL   30 mLs by Per J Tube route daily   Quantity:  840 mL   Refills:  2       multivitamins CF formula Caps capsule   Used for:  History of pancreatectomy        Dose:  1 capsule   Take 1 capsule  by mouth daily   Quantity:  30 capsule   Refills:  0       oxyCODONE 5 MG IR tablet   Commonly known as:  ROXICODONE   Used for:  Acute post-operative pain        Dose:  5 mg   Take 1 tablet (5 mg) by mouth every 4 hours as needed for moderate to severe pain   Quantity:  210 tablet   Refills:  0       pantoprazole 2 mg/mL suspension   Commonly known as:  PROTONIX   Used for:  Acquired total absence of pancreas        Dose:  40 mg   20 mLs (40 mg) by Per J Tube route 2 times daily   Quantity:  280 mL   Refills:  1       pregabalin 20 MG/ML solution   Commonly known as:  LYRICA   Used for:  Acquired total absence of pancreas        Dose:  50 mg   2.5 mLs (50 mg) by Per J Tube route 2 times daily   Quantity:  70 mL   Refills:  1       promethazine 6.25 MG/5ML syrup   Commonly known as:  PHENERGAN   Used for:  Acquired total absence of pancreas        Dose:  12.5 mg   10 mLs (12.5 mg) by Per Feeding Tube route every 4 hours as needed for nausea   Quantity:  560 mL   Refills:  2       sennosides 8.8 MG/5ML syrup   Commonly known as:  SENOKOT   Used for:  Acquired total absence of pancreas        Dose:  10 mL   10 mLs by Per Feeding Tube route every morning   Quantity:  280 mL   Refills:  2       sodium bicarbonate 325 MG tablet   Used for:  Acquired total absence of pancreas        Dose:  325 mg   1 tablet (325 mg) by Per J Tube route every 4 hours   Quantity:  84 tablet   Refills:  2       * Notice:  This list has 4 medication(s) that are the same as other medications prescribed for you. Read the directions carefully, and ask your doctor or other care provider to review them with you.      STOP taking     study - etanercept 25 MG/0.5ML subcutaneous injection   Commonly known as:  IDS# 4454                Where to get your medicines      These medications were sent to [facility] - [place], [place] - [address]  [address], [facility] [address]     Phone:  [phone] -  amylase-lipase-protease 32782 UNITS Cpep per EC capsule  - amylase-lipase-protease 05274 UNITS Cpep per EC capsule      Some of these will need a paper prescription and others can be bought over the counter. Ask your nurse if you have questions.     You don't need a prescription for these medications    - insulin aspart 100 UNIT/ML injection  - insulin aspart 100 UNIT/ML injection  - insulin glargine 100 UNIT/ML injection             Protect others around you: Learn how to safely use, store and throw away your medicines at www.disposemymeds.org.             Medication List: This is a list of all your medications and when to take them. Check marks below indicate your daily home schedule. Keep this list as a reference.      Medications           Morning Afternoon Evening Bedtime As Needed    amitriptyline 2 MG/ML solution   Commonly known as:  ELAVIL   10 mLs (20 mg) by Per J Tube route At Bedtime   Last time this was given:  20 mg on 1/22/2017 10:33 PM                                * amylase-lipase-protease 33182 UNITS Cpep per EC capsule   Commonly known as:  CREON 24   3 capsules (72,000 Units) by Per J Tube route 3 times daily (with meals)   Last time this was given:  72,000 Units on 1/23/2017 12:31 PM                                * amylase-lipase-protease 42341 UNITS Cpep per EC capsule   Commonly known as:  CREON 24   Take 1 capsule (24,000 Units) by mouth as needed   Last time this was given:  72,000 Units on 1/23/2017 12:31 PM                                aspirin 325 MG tablet   Take 1 tablet (325 mg) by mouth daily Take 1 aspirin, 325 mg. Crush the aspirin and administer via jejunal tube. Flush with 30 cc of free water.   Last time this was given:  325 mg on 1/22/2017  9:45 PM                                BD SHARPS  Misc   1 Container as needed                                * blood glucose monitoring lancets   Use to test blood sugar 6-8 times daily or as directed.                                 * blood glucose monitoring lancets   Use up to 6 times daily                                blood glucose monitoring meter device kit   1 kit by In Vitro route every 4 hours Use to test blood sugar 6 times daily or as directed.  Or whatever is covered                                * blood glucose monitoring test strip   Commonly known as:  PAT CONTOUR NEXT   Use to test blood sugar 6-8 times daily or as directed.                                * blood glucose monitoring test strip   Commonly known as:  ACCU-CHEK EMMY   Use to test blood sugar 6 times daily or as directed.                                docusate 50 MG/5ML liquid   Commonly known as:  COLACE   5 mLs (50 mg) by Per J Tube route daily                                glucose 40 % Gel gel   Take 15-30 g by mouth every 15 minutes as needed for low blood sugar   Last time this was given:  15 g on 1/22/2017  8:04 PM                                HYDROmorphone 1 MG/ML Liqd liquid   Commonly known as:  DILAUDID   3 mLs (3 mg) by Per Feeding Tube route every 3 hours   Last time this was given:  2 mg on 1/23/2017  3:46 AM                                * insulin aspart 100 UNIT/ML injection   Commonly known as:  NovoLOG PEN   Insulin to carbohydrate ratio: 1 unit per 15 grams CHO.                                * insulin aspart 100 UNIT/ML injection   Commonly known as:  NovoLOG PEN   aspart medium correction 1 unit per 50 > 120 every 4 hours ? For Pre-Meal  - 169 give 1 unit.  For Pre-Meal  - 219 give 2 units.  For Pre-Meal  - 269 give 3 units.  For Pre-Meal  - 319 give 4 units.  For Pre-Meal -369 give 5 units. For Pre-Meal BG > 370 give 6 units.                                insulin glargine 100 UNIT/ML injection   Commonly known as:  LANTUS   Inject 22 Units Subcutaneous daily                                insulin pen needle 32G X 4 MM   Commonly known as:  BD KEN U/F   Use 4-6 times per day                                 levothyroxine 25 mcg/mL   Commonly known as:  SYNTHROID   5 mLs (125 mcg) by Per J Tube route every morning (before breakfast)   Last time this was given:  125 mcg on 1/23/2017  8:46 AM                                LORazepam 2 MG/ML (HIGH CONC) solution   Commonly known as:  ATIVAN   Take 0.25 mLs (0.5 mg) by mouth every 6 hours as needed for anxiety, nausea or vomiting   Last time this was given:  0.5 mg on 1/22/2017  9:24 PM                                magnesium hydroxide 400 MG/5ML suspension   Commonly known as:  MILK OF MAGNESIA   30 mLs by Per J Tube route daily   Last time this was given:  30 mLs on 1/23/2017  9:39 AM                                multivitamins CF formula Caps capsule   Take 1 capsule by mouth daily                                oxyCODONE 5 MG IR tablet   Commonly known as:  ROXICODONE   Take 1 tablet (5 mg) by mouth every 4 hours as needed for moderate to severe pain   Last time this was given:  5 mg on 1/23/2017  6:23 AM                                pantoprazole 2 mg/mL suspension   Commonly known as:  PROTONIX   20 mLs (40 mg) by Per J Tube route 2 times daily   Last time this was given:  40 mg on 1/23/2017  8:45 AM                                pregabalin 20 MG/ML solution   Commonly known as:  LYRICA   2.5 mLs (50 mg) by Per J Tube route 2 times daily   Last time this was given:  50 mg on 1/23/2017 11:23 AM                                prochlorperazine 5 MG tablet   Commonly known as:  COMPAZINE   1 tablet (5 mg) by Per J Tube route every 6 hours   Last time this was given:  5 mg on 1/23/2017  7:58 AM                                promethazine 6.25 MG/5ML syrup   Commonly known as:  PHENERGAN   10 mLs (12.5 mg) by Per Feeding Tube route every 4 hours as needed for nausea                                sennosides 8.8 MG/5ML syrup   Commonly known as:  SENOKOT   10 mLs by Per Feeding Tube route every morning                                sodium bicarbonate 325 MG tablet    1 tablet (325 mg) by Per J Tube route every 4 hours   Last time this was given:  325 mg on 1/23/2017 12:31 PM                                * Notice:  This list has 8 medication(s) that are the same as other medications prescribed for you. Read the directions carefully, and ask your doctor or other care provider to review them with you.
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
## 2017-01-22 NOTE — IP AVS SNAPSHOT
[address] [address]    Phone:  [phone]                                       After Visit Summary   1/22/2017    Dinora Mcghee    MRN: [id number]           After Visit Summary Signature Page     I have received my discharge instructions, and my questions have been answered. I have discussed any challenges I see with this plan with the nurse or doctor.    ..........................................................................................................................................  Patient/Patient Representative Signature      ..........................................................................................................................................  Patient Representative Print Name and Relationship to Patient    ..................................................               ................................................  Date                                            Time    ..........................................................................................................................................  Reviewed by Signature/Title    ...................................................              ..............................................  Date                                                            Time

## 2017-01-23 ENCOUNTER — TELEPHONE (OUTPATIENT)
Dept: TRANSPLANT | Facility: CLINIC | Age: 52
End: 2017-01-23

## 2017-01-23 VITALS
OXYGEN SATURATION: 100 % | SYSTOLIC BLOOD PRESSURE: 128 MMHG | HEART RATE: 64 BPM | WEIGHT: 150.2 LBS | TEMPERATURE: 98 F | BODY MASS INDEX: 22.84 KG/M2 | DIASTOLIC BLOOD PRESSURE: 76 MMHG | RESPIRATION RATE: 16 BRPM

## 2017-01-23 LAB
AMYLASE SERPL-CCNC: 45 U/L (ref 30–110)
ANION GAP SERPL CALCULATED.3IONS-SCNC: 6 MMOL/L (ref 3–14)
BUN SERPL-MCNC: 12 MG/DL (ref 7–30)
CALCIUM SERPL-MCNC: 8.2 MG/DL (ref 8.5–10.1)
CHLORIDE SERPL-SCNC: 107 MMOL/L (ref 94–109)
CO2 SERPL-SCNC: 27 MMOL/L (ref 20–32)
CREAT SERPL-MCNC: 0.47 MG/DL (ref 0.52–1.04)
ERYTHROCYTE [DISTWIDTH] IN BLOOD BY AUTOMATED COUNT: 13.4 % (ref 10–15)
GFR SERPL CREATININE-BSD FRML MDRD: ABNORMAL ML/MIN/1.7M2
GLUCOSE BLDC GLUCOMTR-MCNC: 138 MG/DL (ref 70–99)
GLUCOSE BLDC GLUCOMTR-MCNC: 81 MG/DL (ref 70–99)
GLUCOSE BLDC GLUCOMTR-MCNC: 85 MG/DL (ref 70–99)
GLUCOSE SERPL-MCNC: 80 MG/DL (ref 70–99)
HCT VFR BLD AUTO: 30.9 % (ref 35–47)
HGB BLD-MCNC: 9.4 G/DL (ref 11.7–15.7)
LIPASE SERPL-CCNC: 35 U/L (ref 73–393)
MAGNESIUM SERPL-MCNC: 2 MG/DL (ref 1.6–2.3)
MCH RBC QN AUTO: 27.8 PG (ref 26.5–33)
MCHC RBC AUTO-ENTMCNC: 30.4 G/DL (ref 31.5–36.5)
MCV RBC AUTO: 91 FL (ref 78–100)
PHOSPHATE SERPL-MCNC: 3.6 MG/DL (ref 2.5–4.5)
PLATELET # BLD AUTO: 674 10E9/L (ref 150–450)
POTASSIUM SERPL-SCNC: 3.6 MMOL/L (ref 3.4–5.3)
RBC # BLD AUTO: 3.38 10E12/L (ref 3.8–5.2)
SODIUM SERPL-SCNC: 140 MMOL/L (ref 133–144)
WBC # BLD AUTO: 7.2 10E9/L (ref 4–11)

## 2017-01-23 PROCEDURE — 25000125 ZZHC RX 250: Performed by: STUDENT IN AN ORGANIZED HEALTH CARE EDUCATION/TRAINING PROGRAM

## 2017-01-23 PROCEDURE — 25000132 ZZH RX MED GY IP 250 OP 250 PS 637: Performed by: STUDENT IN AN ORGANIZED HEALTH CARE EDUCATION/TRAINING PROGRAM

## 2017-01-23 PROCEDURE — 36415 COLL VENOUS BLD VENIPUNCTURE: CPT | Performed by: STUDENT IN AN ORGANIZED HEALTH CARE EDUCATION/TRAINING PROGRAM

## 2017-01-23 PROCEDURE — 82150 ASSAY OF AMYLASE: CPT | Performed by: STUDENT IN AN ORGANIZED HEALTH CARE EDUCATION/TRAINING PROGRAM

## 2017-01-23 PROCEDURE — 00000146 ZZHCL STATISTIC GLUCOSE BY METER IP

## 2017-01-23 PROCEDURE — 80048 BASIC METABOLIC PNL TOTAL CA: CPT | Performed by: STUDENT IN AN ORGANIZED HEALTH CARE EDUCATION/TRAINING PROGRAM

## 2017-01-23 PROCEDURE — 84100 ASSAY OF PHOSPHORUS: CPT | Performed by: STUDENT IN AN ORGANIZED HEALTH CARE EDUCATION/TRAINING PROGRAM

## 2017-01-23 PROCEDURE — 83690 ASSAY OF LIPASE: CPT | Performed by: STUDENT IN AN ORGANIZED HEALTH CARE EDUCATION/TRAINING PROGRAM

## 2017-01-23 PROCEDURE — 85027 COMPLETE CBC AUTOMATED: CPT | Performed by: STUDENT IN AN ORGANIZED HEALTH CARE EDUCATION/TRAINING PROGRAM

## 2017-01-23 PROCEDURE — 83735 ASSAY OF MAGNESIUM: CPT | Performed by: STUDENT IN AN ORGANIZED HEALTH CARE EDUCATION/TRAINING PROGRAM

## 2017-01-23 PROCEDURE — G0378 HOSPITAL OBSERVATION PER HR: HCPCS

## 2017-01-23 PROCEDURE — 25000132 ZZH RX MED GY IP 250 OP 250 PS 637: Performed by: PHYSICIAN ASSISTANT

## 2017-01-23 RX ORDER — HEPARIN SODIUM (PORCINE) LOCK FLUSH IV SOLN 100 UNIT/ML 100 UNIT/ML
5 SOLUTION INTRAVENOUS
Status: DISCONTINUED | OUTPATIENT
Start: 2017-01-23 | End: 2017-01-23 | Stop reason: HOSPADM

## 2017-01-23 RX ORDER — HEPARIN SODIUM,PORCINE 10 UNIT/ML
5-10 VIAL (ML) INTRAVENOUS
Status: DISCONTINUED | OUTPATIENT
Start: 2017-01-23 | End: 2017-01-23 | Stop reason: HOSPADM

## 2017-01-23 RX ORDER — HEPARIN SODIUM,PORCINE 10 UNIT/ML
5-10 VIAL (ML) INTRAVENOUS EVERY 24 HOURS
Status: DISCONTINUED | OUTPATIENT
Start: 2017-01-23 | End: 2017-01-23 | Stop reason: HOSPADM

## 2017-01-23 RX ORDER — LIDOCAINE 40 MG/G
CREAM TOPICAL
Status: DISCONTINUED | OUTPATIENT
Start: 2017-01-23 | End: 2017-01-23 | Stop reason: HOSPADM

## 2017-01-23 RX ADMIN — LEVOTHYROXINE SODIUM 125 MCG: 300 TABLET ORAL at 08:46

## 2017-01-23 RX ADMIN — DEXTROSE AND SODIUM CHLORIDE: 5; 900 INJECTION, SOLUTION INTRAVENOUS at 08:41

## 2017-01-23 RX ADMIN — PROCHLORPERAZINE MALEATE 5 MG: 5 TABLET, FILM COATED ORAL at 00:15

## 2017-01-23 RX ADMIN — PREGABALIN 50 MG: 20 SOLUTION ORAL at 11:23

## 2017-01-23 RX ADMIN — HYDROMORPHONE HYDROCHLORIDE 2 MG: 1 SOLUTION ORAL at 03:46

## 2017-01-23 RX ADMIN — PROCHLORPERAZINE MALEATE 5 MG: 5 TABLET, FILM COATED ORAL at 07:58

## 2017-01-23 RX ADMIN — PANCRELIPASE 72000 UNITS: 24000; 76000; 120000 CAPSULE, DELAYED RELEASE PELLETS ORAL at 12:31

## 2017-01-23 RX ADMIN — MAGNESIUM HYDROXIDE 30 ML: 400 SUSPENSION ORAL at 09:39

## 2017-01-23 RX ADMIN — OXYCODONE HYDROCHLORIDE 5 MG: 5 TABLET ORAL at 06:23

## 2017-01-23 RX ADMIN — SODIUM BICARBONATE 325 MG: 325 TABLET ORAL at 12:31

## 2017-01-23 RX ADMIN — INSULIN GLARGINE 22 UNITS: 100 INJECTION, SOLUTION SUBCUTANEOUS at 13:14

## 2017-01-23 RX ADMIN — HYDROMORPHONE HYDROCHLORIDE 3 MG: 1 SOLUTION ORAL at 00:15

## 2017-01-23 RX ADMIN — PANTOPRAZOLE SODIUM 40 MG: 40 TABLET, DELAYED RELEASE ORAL at 08:45

## 2017-01-23 RX ADMIN — OXYCODONE HYDROCHLORIDE 5 MG: 5 TABLET ORAL at 02:31

## 2017-01-23 NOTE — PLAN OF CARE
"Problem: Goal Outcome Summary  Goal: Goal Outcome Summary  Outcome: Adequate for Discharge Date Met:  01/23/17  Vital signs stable. Patient alert, oriented and up independently. Complains of \"shearing\" pain in abdomen; declining analgesics at this time. Complains of intermittent nausea; has PRN compazine and phenergan available. Port-a-cath de-accessed. AVS reviewed with patient. Patient left unit ambulating with         "

## 2017-01-23 NOTE — H&P
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 Patient awoke this am choking on her tube feeds. She vented her G tube and had moderate sized output. She did not vomit. She presented to the ED to check GJ tube positioning. IR replaced GJ tube. Patient continued to feel nauseous despite repositioning with proper placement of GJ tube. Has been having normal bowel movements and urinating without dysuria.      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      Choledocholithiasis      long after cholecystectomy     Chronic abdominal pain      Migraines      PONV (postoperative nausea and vomiting)      Gastroparesis      Arthritis      left knee     Allergy to other foods      strawberries, apples, celeries, alice, watermelon     History of pituitary adenoma      s/p resection     Chronic pancreatitis (H)      On tube feeding diet      presence of GJ tube     Mild hyperlipidemia      Hiatal hernia      Chronic nausea      Portacath in place      Chronic constipation      Degeneration of lumbar or  lumbosacral intervertebral disc        Past Surgical History:  Past Surgical History   Procedure Laterality Date     C total abdom hysterectomy       w/ bilateral salpingoophorectomy      Laparoscopic pineda       Appendectomy       C excis pituitary,transnasal/septal       pituitary tumor removed for diabetes insipidus     C  delivery only       C  delivery only       repeat c section with incidental cystotomy with repair     Hc ugi endoscopy diag w biopsy  08     Hc ugi endoscopy w esophageal dilation balloon <30mm  08     C wrist arthroscop,release xvers lig Bilateral 08     Hc ugi endoscopy diag w biopsy  12     Hc ugi endoscopy w eus N/A 2015     Procedure: COMBINED ENDOSCOPIC ULTRASOUND, ESOPHAGOSCOPY, GASTROSCOPY, DUODENOSCOPY (EGD);  Surgeon: Wm Dueñas MD;  Location: [facility]     Endoscopic retrograde cholangiopancreatogram N/A 2015     Procedure: ENDOSCOPIC RETROGRADE CHOLANGIOPANCREATOGRAM;  Surgeon: Mandeep Park MD;  Location: [facility]     Esophagoscopy, gastroscopy, duodenoscopy (egd), combined N/A 2015     Procedure: COMBINED ESOPHAGOSCOPY, GASTROSCOPY, DUODENOSCOPY (EGD), REMOVE FOREIGN BODY;  Surgeon: Mandeep Park MD;  Location: [facility]     Esophagoscopy, gastroscopy, duodenoscopy (egd), dilatation, combined       Esophagoscopy, gastroscopy, duodenoscopy (egd), combined N/A 10/25/2015     Procedure: COMBINED ESOPHAGOSCOPY, GASTROSCOPY, DUODENOSCOPY (EGD);  Surgeon: Sammy Amaro MD;  Location: [facility]     Esophagoscopy, gastroscopy, duodenoscopy (egd), combined N/A 10/25/2015     Procedure: COMBINED ESOPHAGOSCOPY, GASTROSCOPY, DUODENOSCOPY (EGD), BIOPSY SINGLE OR MULTIPLE;  Surgeon: Sammy Amaro MD;  Location: [facility]     Hc esoph/gas reflux test w nasal imped >1 hr N/A 2015     Procedure: ESOPHAGEAL IMPEDENCE FUNCTION TEST WITH 24 HOUR PH GREATER THAN 1 HOUR;  Surgeon: Thiago Apple  MD;  Location: [facility]     Endoscopic retrograde cholangiopancreatogram N/A 2016     Procedure: COMBINED ENDOSCOPIC RETROGRADE CHOLANGIOPANCREATOGRAPHY, PLACE TUBE/STENT;  Surgeon: Mandeep Park MD;  [facility]     Endoscopic retrograde cholangiopancreatogram N/A 3/17/2016     Procedure: COMBINED ENDOSCOPIC RETROGRADE CHOLANGIOPANCREATOGRAPHY, REMOVE FOREIGN BODY OR STENT/TUBE;  Surgeon: Mandeep Park MD;  [facility]     Endoscopic retrograde cholangiopancreatogram N/A 2016     Procedure: COMBINED ENDOSCOPIC RETROGRADE CHOLANGIOPANCREATOGRAPHY, PLACE TUBE/STENT;  Surgeon: Mandeep Park MD;  [facility]     Endoscopic retrograde cholangiopancreatogram N/A 2016     Procedure: COMBINED ENDOSCOPIC RETROGRADE CHOLANGIOPANCREATOGRAPHY, REMOVE FOREIGN BODY OR STENT/TUBE;  Surgeon: Mandeep Park MD;  [facility]     Inject transversus abdominis plane (tap) block bilateral Left 2016     Procedure: INJECT TRANSVERSUS ABDOMINIS PLANE (TAP) BLOCK BILATERAL;  Surgeon: Dickson Corrigan MD;  [facility]     Endoscopic ultrasound upper gastrointestinal tract (gi) N/A 10/3/2016     Procedure: ENDOSCOPIC ULTRASOUND, ESOPHAGOSCOPY / UPPER GASTROINTESTINAL TRACT (GI);  Surgeon: Guru Jose Rodas MD;  Location: [facility]     Abdomen surgery       c sections: 93, 96, 98. endometriosis growth     Colonoscopy       Transphenoidal pituitary resection        section       Laparoscopic pancreatectomy, transplant auto islet cell N/A 2016     Procedure: LAPAROSCOPIC PANCREATECTOMY, TRANSPLANT AUTO ISLET CELL;  Surgeon: Nestor Phoenix MD;  Location: [facility]       Allergies:     Allergies   Allergen Reactions     Apple Anaphylaxis     Depakote [Divalproex Sodium] Other (See Comments)     Chest pain     Zithromax [Azithromycin Dihydrate] Anaphylaxis     Noted in 08 ER     Darvocet [Propoxyphene N-Apap] Itching      Morphine Nausea and Vomiting and Rash     Nalbuphine Hcl Rash     RASH :nubaine     [place] Hives     Zosyn [Piperacillin-Tazobactam In D5w] Rash     Possible allergy, did have a diffuse rash that seemed drug related but could have also been related to soap in the hospital.      Bactrim [Sulfamethoxazole W-Trimethoprim] Other (See Comments) and Nausea and Vomiting     Severely low liver function.     Cats      Compazine [Prochlorperazine] Other (See Comments)     Twitching. Takes Benedryl and is fine     Corticosteroids Other (See Comments)     All oral,IV and injectable steroids are contraindicated (unless in life threatening situations) in Islet Auto transplant recipients. They can cause irreversible loss of islet cell function. Please contact patients transplant care coordinator Mecca Watkins RN BSN @ [phone]/Pager [phone], and Endocrinologist prior to administration.     Dust Mites      Grass      Prednisone Other (See Comments)     Insomnia       Ragweeds      Tape [Adhesive Tape] Blisters     Trees      Zofran [Ondansetron] Other (See Comments)     migraine       Medications:    No current facility-administered medications on file prior to encounter.  Current Outpatient Prescriptions on File Prior to Encounter:  insulin glargine (LANTUS) 100 UNIT/ML injection Inject 24 units subcutaneously every day at 8:00 AM.   amitriptyline (ELAVIL) 2 MG/ML solution 10 mLs (20 mg) by Per J Tube route At Bedtime   levothyroxine (SYNTHROID) 25 mcg/mL 5 mLs (125 mcg) by Per J Tube route every morning (before breakfast)   oxyCODONE (ROXICODONE) 5 MG IR tablet Take 1 tablet (5 mg) by mouth every 4 hours as needed for moderate to severe pain   pantoprazole (PROTONIX) 2 mg/mL suspension 20 mLs (40 mg) by Per J Tube route 2 times daily   sodium bicarbonate 325 MG tablet 1 tablet (325 mg) by Per J Tube route every 4 hours   pregabalin (LYRICA) 20 MG/ML solution 2.5 mLs (50 mg) by Per J Tube route 2 times daily    sennosides (SENOKOT) 8.8 MG/5ML syrup 10 mLs by Per Feeding Tube route every morning   docusate (COLACE) 50 MG/5ML liquid 5 mLs (50 mg) by Per J Tube route daily   aspirin 325 MG tablet Take 1 tablet (325 mg) by mouth daily Take 1 aspirin, 325 mg. Crush the aspirin and administer via jejunal tube. Flush with 30 cc of free water.   magnesium hydroxide (MILK OF MAGNESIA) 400 MG/5ML suspension 30 mLs by Per J Tube route daily   HYDROmorphone (DILAUDID) 1 MG/ML LIQD liquid 3 mLs (3 mg) by Per Feeding Tube route every 3 hours   LORazepam (ATIVAN) 2 MG/ML (HIGH CONC) solution Take 0.25 mLs (0.5 mg) by mouth every 6 hours as needed for anxiety, nausea or vomiting (Patient taking differently: 0.5 mg by Per J Tube route every 6 hours as needed for anxiety, nausea or vomiting )   amylase-lipase-protease (CREON 24) 51828 UNITS CPEP per EC capsule 1-2 capsules (24,000-48,000 Units) by Per J Tube route every 4 hours (Patient taking differently: 3 capsules by Per J Tube route 3 times daily (with meals) )   promethazine (PHENERGAN) 6.25 MG/5ML syrup 10 mLs (12.5 mg) by Per Feeding Tube route every 4 hours as needed for nausea   insulin aspart (NOVOLOG PEN) 100 UNIT/ML injection Inject 1-6 Units Subcutaneous every 4 hours Insulin to carbohydrate ratio: 1 unit per 15 grams CHO.   multivitamins CF formula (MVW COMPLETE FORMULATION) CAPS capsule Take 1 capsule by mouth daily   study - etanercept (IDS# 4454) 25 MG/0.5ML subcutaneous injection Inject 25 mg Subcutaneous once for 1 dose On 1/17/17, day 21 dose   glucose 40 % GEL gel Take 15-30 g by mouth every 15 minutes as needed for low blood sugar   prochlorperazine (COMPAZINE) 5 MG tablet 1 tablet (5 mg) by Per J Tube route every 6 hours (Patient taking differently: 5 mg by Per J Tube route every 6 hours as needed )   Sharps Container (BD SHARPS ) MISC 1 Container as needed   insulin pen needle (BD KEN U/F) 32G X 4 MM Use 4-6 times per day   blood glucose monitoring  (ACCU-CHEK MULTICLIX) lancets Use up to 6 times daily   blood glucose monitoring (ACCU-CHEK EMMY PLUS) meter device kit 1 kit by In Vitro route every 4 hours Use to test blood sugar 6 times daily or as directed.Or whatever is covered   blood glucose monitoring (ACCU-CHEK EMMY) test strip Use to test blood sugar 6 times daily or as directed.   blood glucose monitoring (PAT CONTOUR NEXT) test strip Use to test blood sugar 6-8 times daily or as directed.   blood glucose monitoring (PAT MICROLET) lancets Use to test blood sugar 6-8 times daily or as directed.       Social History:  Social History     Social History     Marital Status:      Spouse Name: Norris     Number of Children: 3     Years of Education: 16     Occupational History     director [employer]     Social History Main Topics     Smoking status: Former Smoker -- 0.50 packs/day for 6 years     Types: Cigarettes     Start date: 09/01/1985     Quit date: 01/01/1992     Smokeless tobacco: Never Used      Comment: no 2nd hand     Alcohol Use: No      Comment: last drink 6/2016  - moderate social     Drug Use: No     Sexual Activity:     Partners: Male     Birth Control/ Protection: None      Comment: Hystectomy 1999     Other Topics Concern     Parent/Sibling W/ Cabg, Mi Or Angioplasty Before 65f 55m? No     Blood Transfusions No     Seat Belt Yes     Social History Narrative     with 3 children and a dog.  No smoking, etoh or drug use.  Worked as a  for [email or web address] in [place] in the past.  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       Family History:  Family History   Problem Relation Age of Onset     Eye Disorder Paternal Grandmother      cataract     Eye Disorder Paternal Grandfather      cataract     Eye Disorder Maternal Grandmother      cataract     Eye Disorder Father      cataract, detached retina     Eye Disorder Son      ptosis     Myocardial Infarction Father 60     Coronary Artery  Disease Sister      Lipids Mother      Lipids Father      Depression Sister      Depression Son      Anxiety Disorder Son      Thyroid Disease Sister      Thyroid Disease Maternal Grandmother      DIABETES Maternal Grandfather      DIABETES Paternal Grandfather      Hypertension Mother      CEREBROVASCULAR DISEASE Father      Depression Father      Depression Nephew      Anxiety Disorder Nephew      Substance Abuse Father      Anesthesia Reaction Father      stroke right after surgery     Thyroid Disease Mother      Thyroid Disease Nephew      Type 2 Diabetes Cousin      paternal cousin       ROS:  The remainder of the complete ROS was negative unless noted in the HPI.    Exam:  /72 mmHg  Pulse 100  Temp(Src) 97.6  F (36.4  C) (Oral)  Resp 16  Wt 63.504 kg (140 lb)  SpO2 100%    General: Alert, interactive, NAD  Resp: CTAB, no crackles or wheezes  Cardiac: RRR, NS1,S2, No m/r/g  Abdomen: Scar from prior incision healing well. Soft, mild tenderness noted along the bilateral upper quadrants, nondistended. No HSM or masses, no rebound or guarding.  Extremities: No LE edema or obvious joint abnormalities  Skin: Warm and dry, no jaundice or rash    Labs:  Labs reviewed  WBC 7.8  Hgb 11.2     Imaging:  Bedside AXR with gastrograffin read pending      Shiraz Trujillo MD PGY-1..................1/22/2017   8:22 PM  General Surgery Resident  P: [id number]    Discussed with Dr. Bennett on 1/22/17

## 2017-01-23 NOTE — PROGRESS NOTES
Observation Goals:  -Repeat imaging completed: Not met  -Improvement in nausea: Partially met; pt states she is slightly nauseated but declines intervention  -Improvement in pain: Not met, prn oxy administered x1 and scheduled dilaudid administered (However pt requested to have 2mg of dilaudid instead of ordered 3mg; pt stated she takes 2mg at home which works well and 3mg makes her groggy)  -Tolerates restart of Tube feeds: Not met- TF not restarted.    -MD to notify when ready to discharge

## 2017-01-23 NOTE — PROGRESS NOTES
Observation Goals:  -Repeat imaging completed: Not met  -Improvement in nausea: WENDI, pt sleeping. Scheduled compazine given.   -Improvement in pain: Not met, scheduled dilaudid given  -Tolerates restart of Tube feeds: Not met- TF not restarted.    -MD to notify when ready to discharge

## 2017-01-23 NOTE — PLAN OF CARE
Problem: Discharge Planning  Goal: Discharge Planning (Adult, OB, Behavioral, Peds)  Repeat imaging completed: YES  Improvement in nausea: NO, ativan given with relief  Tolerates restart of Tube feeds: PENDING, to be started in morning    Pt A&Ox4, VSS. Pt c/o sharp pain around new GJ tube, dilaudid given with relief.  Pt c/o nausea, ativan given with relief.  Pt BS fluctuating, lows of 64 with high 108, Dextrose 15 gel given and D5 IV given with BS increase, physician notified and D5 NS 100mL/hour ordered and Dextrose 10% through J-tube ordered. Physician paged to clarify Dextrose 10% via J-tube order.  Pt to begin tube feedings in morning per physician, pt can receive daily medications through J-tube tonight.     MD to notify when ready to discharge

## 2017-01-23 NOTE — PROGRESS NOTES
"CLINICAL NUTRITION SERVICES - ASSESSMENT NOTE    Nutrition Prescription    RECOMMENDATIONS FOR MDs/PROVIDERS TO ORDER:  None at this time    Malnutrition:    Patient does not meet two of the above criteria necessary for diagnosing malnutrition in the context    Recommendations already ordered by Registered Dietitian (RD):  1. Ordered Impact Peptide @ goal 60 ml/hr (1440 ml/day) to provide 2160 kcals, 135 g PRO, 1109 ml free H2O, 92 g Fat (50% from MCTs), 202 g CHO and no Fiber daily.  - 60 ml H2O Flush q 4 hrs   2.Order Creon 24 3 tabs q 4 hrs with TF     Future/Additional Recommendations:  Tolerance to TF     REASON FOR ASSESSMENT  Dinora Mcghee is a/an 51 year old female assessed by the dietitian for Provider Order - Registered Dietitian to Assess and Order TF per Medical Nutrition protocol  - restart PTA tube feed at goal rate. Continuous Impact Peptide at 60 ml/hr     NUTRITION HISTORY  Per chart review patient is on Impact Peptide at 60 ml/hr with Creon 24 3 tabs q 4 hrs at home. Team increased Creon as patient reported sticky stools. IR replaced GJ tube. Per patient TF was going well at home. Patient reports she was taking clear/full liquids at home PTA. She likes ensure clear but can only take the blueberry pomegranate flavor due to allergies to apple and strawberry    CURRENT NUTRITION ORDERS  Diet:  NPO     LABS  Labs reviewed    MEDICATIONS  Medications reviewed  Creon 24 2 tabs q 4 hr this provides 3130 units of lipase/gm fat/day  Certavite    ANTHROPOMETRICS  Height: 5'8\"  Most Recent Weight: 150 lbs 3.2 oz   IBW: 63.6 kg  BMI: Normal BMI  Weight History:   Wt Readings from Last 10 Encounters:   01/23/17 68.13 kg (150 lb 3.2 oz)   01/09/17 65.137 kg (143 lb 9.6 oz)   01/08/17 66.951 kg (147 lb 9.6 oz)   12/20/16 69.582 kg (153 lb 6.4 oz)   12/07/16 67.586 kg (149 lb)   12/02/16 68.04 kg (150 lb)   11/29/16 68.493 kg (151 lb)   11/29/16 68.493 kg (151 lb)   11/29/16 68.9 kg (151 lb 14.4 oz) "   10/18/16 67.631 kg (149 lb 1.6 oz)   weight appears stable   Dosing Weight: 68 kg (admit wt)    ASSESSED NUTRITION NEEDS  Estimated Energy Needs: 4010-2645 kcals/day (25-30 Kcal/Kg)  Justification: post-op and pending EN absorption post total pancreatectomy  Estimated Protein Needs:  grams protein/day (1.3-1.8 g pro/Kg)  Justification: post-op and pending EN absorption post total pancreatectomy  Estimated Fluid Needs: 1 mL/Kcal   Justification: per provider pending fluid status    PHYSICAL FINDINGS  See malnutrition section below.    MALNUTRITION  % Intake:  Decreased intake does not meet criteria for malnutrition   % Weight Loss:  None noted  Subcutaneous Fat Loss:  None observed  Muscle Loss:  None observed  Fluid Accumulation/Edema:  None noted  Malnutrition Diagnosis: Patient does not meet two of the above criteria necessary for diagnosing malnutrition in the context    NUTRITION DIAGNOSIS  Altered GI function related to post-op total pancreatectomy as evidenced by NPO with TF to meet 100% of estimated nutritional needs      INTERVENTIONS  Implementation  1. Nutrition education: Provided education on TF start   2. EN Composition - Ordered TF  3. Wrote discharge TF instructions as follows:  Tube Feeding Regimen:  Total pump rate = 60 ml/hr     Store daily TF volume in empty milk jug container or closed pitcher in the refrigerator.      Tube Feeding Administration:  Every 4 hours:    1. Pour 240 ml tube feeding to graduated cylinder.    2. Add prepared enzymes (see below) and mix into solution well.   3. Add mixture to the TF bag.     Overnight -> okay to try doubling the above amounts (for an 8 hour increment).  Monitor for bloating, excessive gas, oily/floating diarrhea.     Enzyme preparation:   1) Crush 325 mg sodium bicarbonate and add/mix into 15 ml (1 tbsp) warm water.  2) Open 3 Creon capsules and add beads to the sodium bicarbonate/water solution.  Let this sit for ~30 minutes.  3) When solution  completely dissolved (no clumps/chunks), then add and mix well into the tube feeding formula in the bag    Vitamin/Mineral Recommendations:  Multivitamin with minerals (Certavite) through feeding tube daily  When okay to switch to oral medications -> okay to take oral forms of these medications.     Recommend checking vitamin A, D, E, K and B12 every 1-2 years following surgery due to risk for deficiencies.     Goals  TF to meet a minimum of 25 kcals/kg and 1.3 g/kg protein per dosing weight 68 kg    Monitoring/Evaluation  Progress toward goals will be monitored and evaluated per protocol.    Angelika Feliciano MS/RD/LD/CNSC  [address] RD Pager: [phone]

## 2017-01-23 NOTE — TELEPHONE ENCOUNTER
Received a call from Dinora 1/22/17 that she woke up at 8 AM today gagging and choking on formula. She vented her g-tube for 5 minutes with bile-smelling formula slowly coming out. She recapped her g-tube and was able to take fluids PO. She described belching formula continually despite taking Mylicon. She stated her blood pressure was 92/71. She stated her stomach feels so full. Urine smells like strong formula and is dark karime.She requested this RN to call the [place] ED so she could go and get fluids. She had already taken her Lantus 22 units this morning.  This RN instructed her not to lie down, but to remain sitting up, not to shut her tube feeds off, not take any additional PO fluids, and to begin gathering her things as she needed to be evaluated. Call placed to Dr. Phoenix. Unable to reach. This RN requested she come to the ED at the [facility] to have her G-J tube evaluated for potential misplacement of her jejunal tube. Dinora stated she thinks her jejunal tube is also in her stomach as she is complaining of abdominal pain and that she had this happen with previous G-J tubes.   Dinora verbalized understanding. Dr. Phoenix returned this RN's call and updated on plan. She notified the pancreas fellow and agreed with this RN's plan of care.

## 2017-01-23 NOTE — PLAN OF CARE
Problem: Goal Outcome Summary  Goal: Goal Outcome Summary  Outcome: No Change  -Repeat imaging completed: Not met  -Improvement in nausea: Partially met - patient received compazine before AM meds, and took time between AM meds  -Improvement in pain: Partially met, prn oxy administered @ 0623. Pt declined scheduled dilaudid due at 0900  -Tolerates restart of Tube feeds: Not met- TF not restarted.    -MD to notify when ready to discharge

## 2017-01-23 NOTE — DISCHARGE INSTRUCTIONS
________________________________________________________  Discharge RN please fax discharge orders to home care agency: [facility]  ________________________________________________________    Tube Feeding Regimen:  Total pump rate = 60 ml/hr     Store daily TF volume in empty milk jug container or closed pitcher in the refrigerator.      Tube Feeding Administration:  Every 4 hours:    1. Pour 240 ml tube feeding to graduated cylinder.    2. Add prepared enzymes (see below) and mix into solution well.   3. Add mixture to the TF bag.     Overnight -> okay to try doubling the above amounts (for an 8 hour increment).  Monitor for bloating, excessive gas, oily/floating diarrhea.     Enzyme preparation:   1) Crush 325 mg sodium bicarbonate and add/mix into 15 ml (1 tbsp) warm water.  2) Open 3 Creon capsules and add beads to the sodium bicarbonate/water solution.  Let this sit for ~30 minutes.  3) When solution completely dissolved (no clumps/chunks), then add and mix well into the tube feeding formula in the bag    Vitamin/Mineral Recommendations:  Multivitamin with minerals (Certavite) through feeding tube daily  When okay to switch to oral medications -> okay to take oral forms of these medications.     Recommend checking vitamin A, D, E, K and B12 every 1-2 years following surgery due to risk for deficiencies.

## 2017-01-23 NOTE — PHARMACY-CONSULT NOTE
Pharmacy Tube Feeding Consult    Medication reviewed for administration by feeding tube and for potential food/drug interactions.    Recommendation: No changes are needed at this time.     Pharmacy will continue to follow as new medications are ordered.    Lea Rangel Pharm.D., [facility]  Pager [phone]

## 2017-01-23 NOTE — PLAN OF CARE
Problem: Goal Outcome Summary  Goal: Goal Outcome Summary  Outcome: No Change  -Repeat imaging completed: Not met  -Improvement in nausea: Partially met - patient received compazine before AM meds, and took time between AM meds, planning to take an antiemetic before afternoon meds  -Improvement in pain: Partially met, prn oxy administered @ 0623. Pt declined scheduled dilaudid due at 0900 and 1200  -Tolerates restart of Tube feeds: TF restarted at 1230  -MD to notify when ready to discharge

## 2017-01-23 NOTE — PROGRESS NOTES
-Repeat imaging completed: Not met  -Improvement in nausea: Partially met - pt states she is slightly nauseated but declines intervention. Pt declined scheduled compazine.  -Improvement in pain: Partially met, prn oxy administered @ 0623. Pt declined scheduled dilaudid stating she doesn't like the way it makes her feel.  -Tolerates restart of Tube feeds: Not met- TF not restarted.    -MD to notify when ready to discharge

## 2017-01-23 NOTE — PROVIDER NOTIFICATION
Dr. Trujillo text paged re: pt's BG down to 62 again, RN gave 25 ml of IV D50 per MAR. Pt requesting to have dextrose added to her IV fluids. Will continue to monitor BG closely.     Addendum: New IV fluids ordered with dextrose and Q4 hour BG checks.

## 2017-01-23 NOTE — PROGRESS NOTES
"Care Coordinator  D/I: Pt is known to me from her TPAIT surgery and I met with Dinora, in her room, and she is \"having a headache\". She is glad to be discharging to home today. She has her enteral feeding pump with her. She is happy with [facility] services--I have resumed. She is happy with her follow up care.  P: Resume TF. Bedside nurse, Mamta, to hook her up to TF to discharge to home.  "

## 2017-01-23 NOTE — PLAN OF CARE
Problem: Discharge Planning  Goal: Discharge Planning (Adult, OB, Behavioral, Peds)  Outcome: No Change  Observation Goals:   -Repeat imaging completed: No   -Improvement in nausea: Yes   -Improvement in pain: No   -Tolerates restart of Tube feeds: No    -MD to notify when ready to discharge    Pt transferred from  at 2300. D 10% fluids started in J tube per orders. D 51/2 % NS infusing in IV. Blood glucose was 112 at 2315.

## 2017-01-23 NOTE — DISCHARGE SUMMARY
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 She presented to the ED to check GJ tube positioning due to emesis. IR replaced GJ tube. Patient continued to feel nauseous despite repositioning with proper placement of GJ tube. Has been having normal bowel movements and urinating without dysuria.      Hospital Course  Dinora Mcghee was admitted on 1/22/2017.  The following problems were addressed during her hospitalization:    TP AIT, POD 26    GI/nutrition: GJ tube replaced by IR on 1/22/17. Restart tube feeding regimen of Impact peptide 1.5 continuous at 60 cc/hr. May start full liquids as tolerated. Start Creon 24, 1 capsule with meals/snacks. Continue PTA anti emetic and bowel regimen. G tube clamped, vent as needed.   Post pancreatectomy diabetes: Patient seen with Dr. Melissa. Recommendations from Dr. Melissa, restart lantus 22 units daily  (titrate to am dosing by moving up insulin 3 hours each day). Continue PTA novolog 1 unit per 50 > 120 every 4 hours every 4 hours. Start 1 unit Novolog per 15 grams carbohydrate with meals.   Electrolytes: WNL. Start liquids. Free water pre and post flush.       Melissa Sood PA-C      Code Status  Full Code    Primary Care Physician  Justyna Lawson    Exam:  Constitutional: NAD  Cardiovascular: Well perfused  Respiratory: NLB on RA  Gastrointestinal: soft, benign, mild ttp epigastric. Midline incision well healed. GJ  tube  : no rose  Musculoskeletal: nt x 4  Neurologic: axox3      Time Spent on This Encounter  I, Melissa Sood PA-C, personally saw the patient today and spent less than or equal to 30 minutes discharging this patient.    Discharge Disposition  Discharged to home  Condition at discharge: Stable    Consultations This Hospital Stay  INTERVENTIONAL RADIOLOGY IP CONSULT  MEDICATION HISTORY IP PHARMACY CONSULT  ENDOCRINOLOGY IP CONSULT  NUTRITION SERVICES ADULT IP CONSULT  NUTRITION SERVICES ADULT IP CONSULT    Discharge Orders    Home infusion referral       Discharge Medications  Current Discharge Medication List      CONTINUE these medications which have CHANGED    Details   !! amylase-lipase-protease (CREON 24) 88752 UNITS CPEP per EC capsule 3 capsules (72,000 Units) by Per J Tube route 3 times daily (with meals)  Qty: 168 capsule, Refills: 2    Comments: To be added to tube feeding formula  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      !! 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 insulin aspart (NOVOLOG PEN) 100 UNIT/ML injection Insulin to carbohydrate ratio: 1 unit per 15 grams CHO.  Qty: 15 mL, Refills: 1    Associated Diagnoses: Post-pancreatectomy diabetes (H)      insulin glargine (LANTUS) 100 UNIT/ML injection Inject 22 Units Subcutaneous daily  Qty: 3 mL, Refills: 3    Associated Diagnoses: Post-pancreatectomy diabetes (H)      !! insulin aspart (NOVOLOG PEN) 100 UNIT/ML injection aspart medium correction 1 unit per 50 > 120 every 4 hours    For Pre-Meal  - 169 give 1 unit.   For Pre-Meal  - 219 give 2 units.   For Pre-Meal  - 269 give 3 units.   For Pre-Meal  - 319 give 4 units.   For Pre-Meal -369 give 5 units.  For Pre-Meal BG > 370 give 6 units.  Refills: 3    Associated Diagnoses:  Acquired total absence of pancreas       !! - Potential duplicate medications found. Please discuss with provider.      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      oxyCODONE (ROXICODONE) 5 MG IR tablet Take 1 tablet (5 mg) by mouth every 4 hours as needed for moderate to severe pain  Qty: 210 tablet, Refills: 0    Associated Diagnoses: Acute post-operative pain      pantoprazole (PROTONIX) 2 mg/mL suspension 20 mLs (40 mg) by Per J Tube route 2 times daily  Qty: 280 mL, Refills: 1    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      sodium bicarbonate 325 MG tablet 1 tablet (325 mg) by Per J Tube route every 4 hours  Qty: 84 tablet, Refills: 2    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      pregabalin (LYRICA) 20 MG/ML solution 2.5 mLs (50 mg) by Per J Tube route 2 times daily  Qty: 70 mL, Refills: 1    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      sennosides (SENOKOT) 8.8 MG/5ML syrup 10 mLs by Per Feeding Tube route every morning  Qty: 280 mL, Refills: 2    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      docusate (COLACE) 50 MG/5ML liquid 5 mLs (50 mg) by Per J Tube route daily  Qty: 280 mL, Refills: 2    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      aspirin 325 MG tablet Take 1 tablet (325 mg) by mouth daily Take 1 aspirin, 325 mg. Crush the aspirin and administer via jejunal tube. Flush with 30 cc of free water.  Qty: 180 tablet, Refills: 0    Associated Diagnoses: Elevated platelet count (H); Acquired asplenia; Post-pancreatectomy diabetes (H); History of  pancreatectomy; Pancreatic insufficiency      magnesium hydroxide (MILK OF MAGNESIA) 400 MG/5ML suspension 30 mLs by Per J Tube route daily  Qty: 840 mL, Refills: 2    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      HYDROmorphone (DILAUDID) 1 MG/ML LIQD liquid 3 mLs (3 mg) by Per Feeding Tube route every 3 hours  Qty: 336 mL, Refills: 0    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      LORazepam (ATIVAN) 2 MG/ML (HIGH CONC) solution Take 0.25 mLs (0.5 mg) by mouth every 6 hours as needed for anxiety, nausea or vomiting  Qty: 14 mL, Refills: 1    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      promethazine (PHENERGAN) 6.25 MG/5ML syrup 10 mLs (12.5 mg) by Per Feeding Tube route every 4 hours as needed for nausea  Qty: 560 mL, Refills: 2    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      multivitamins CF formula (MVW COMPLETE FORMULATION) CAPS capsule Take 1 capsule by mouth daily  Qty: 30 capsule, Refills: 0    Associated Diagnoses: History of pancreatectomy      glucose 40 % GEL gel Take 15-30 g by mouth every 15 minutes as needed for low blood sugar  Qty: 3 Tube, Refills: 2    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      prochlorperazine (COMPAZINE) 5 MG tablet 1 tablet (5 mg) by Per J Tube route every 6 hours  Qty: 56 tablet, Refills: 2    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      Sharps Container (BD SHARPS ) MISC 1 Container as needed  Qty: 1 each, Refills: 1    Associated Diagnoses: Post-pancreatectomy diabetes (H)      insulin pen needle (BD KEN U/F) 32G X 4 MM Use 4-6 times per day  Qty: 100 each, Refills: prn    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      !! blood glucose monitoring (ACCU-CHEK MULTICLIX) lancets Use up to 6 times daily  Qty: 1 Box, Refills: 3    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      blood glucose monitoring (ACCU-CHEK EMMY PLUS) meter device kit 1  kit by In Vitro route every 4 hours Use to test blood sugar 6 times daily or as directed.    Or whatever is covered  Qty: 1 kit, Refills: 1    Associated Diagnoses: Post-pancreatectomy diabetes (H)      !! blood glucose monitoring (ACCU-CHEK EMMY) test strip Use to test blood sugar 6 times daily or as directed.  Qty: 200 strip, Refills: prn    Associated Diagnoses: Post-pancreatectomy diabetes (H)      !! blood glucose monitoring (PAT CONTOUR NEXT) test strip Use to test blood sugar 6-8 times daily or as directed.  Qty: 200 strip, Refills: prn    Associated Diagnoses: Acute on chronic pancreatitis (H)      !! blood glucose monitoring (PAT MICROLET) lancets Use to test blood sugar 6-8 times daily or as directed.  Qty: 1 Box, Refills: prn    Associated Diagnoses: Acute on chronic pancreatitis (H)       !! - Potential duplicate medications found. Please discuss with provider.      STOP taking these medications       study - etanercept (IDS# [id number]) 25 MG/0.5ML subcutaneous injection Comments:   Reason for Stopping:         multivitamins with minerals (CERTAVITE/CEROVITE) LIQD liquid Comments:   Reason for Stopping:             Allergies  Allergies   Allergen Reactions     Apple Anaphylaxis     Depakote [Divalproex Sodium] Other (See Comments)     Chest pain     Zithromax [Azithromycin Dihydrate] Anaphylaxis     Noted in 4/7/08 ER     Darvocet [Propoxyphene N-Apap] Itching     Morphine Nausea and Vomiting and Rash     Nalbuphine Hcl Rash     RASH :nubaine     Fentress Hives     Zosyn [Piperacillin-Tazobactam In D5w] Rash     Possible allergy, did have a diffuse rash that seemed drug related but could have also been related to soap in the hospital.      Bactrim [Sulfamethoxazole W-Trimethoprim] Other (See Comments) and Nausea and Vomiting     Severely low liver function.     Cats      Compazine [Prochlorperazine] Other (See Comments)     Twitching. Takes Benedryl and is fine     Corticosteroids Other (See Comments)      All oral,IV and injectable steroids are contraindicated (unless in life threatening situations) in Islet Auto transplant recipients. They can cause irreversible loss of islet cell function. Please contact patients transplant care coordinator Mecca Watkins RN BSN @ [phone]/Pager [phone], and Endocrinologist prior to administration.     Dust Mites      Grass      Prednisone Other (See Comments)     Insomnia       Ragweeds      Tape [Adhesive Tape] Blisters     Trees      Zofran [Ondansetron] Other (See Comments)     migraine     Data  Most Recent 3 CBC's:  Recent Labs   Lab Test  01/23/17   0813  01/22/17   1617  01/17/17   0942   WBC  7.2  7.8  10.1   HGB  9.4*  11.2*  10.5*   MCV  91  90  94   PLT  674*  834*  1234*      Most Recent 3 BMP's:  Recent Labs   Lab Test  01/23/17   0813  01/22/17   1617  01/17/17   0942   NA  140  138  143   POTASSIUM  3.6  4.0  3.6   CHLORIDE  107  102  103   CO2  27  31  36*   BUN  12  23  27   CR  0.47*  0.52  0.58   ANIONGAP  6  5  4   KASSI  8.2*  8.7  9.6   GLC  80  95  98     Most Recent 2 LFT's:  Recent Labs   Lab Test  01/22/17   1617  01/17/17   0942   AST  102*  43   ALT  84*  52*   ALKPHOS  427*  350*   BILITOTAL  0.3  0.6     Most Recent INR's and Anticoagulation Dosing History:  Anticoagulation Dose History     Recent Dosing and Labs Latest Ref Rng 8/2/2016 8/26/2016 9/7/2016 10/3/2016 10/18/2016 12/19/2016 12/28/2016    INR 0.86 - 1.14 1.15(H) 1.11 1.10 1.07 1.22(H) 1.05 1.31(H)        Most Recent 3 Troponin's:  Recent Labs   Lab Test  10/23/15   1554  03/01/10   1952   TROPI  <0.015  The 99th percentile for upper reference range is 0.045 ug/L.  Troponin values in   the range of 0.045 - 0.120 ug/L may be associated with risks of adverse   clinical events.    <0.012     Most Recent Cholesterol Panel:  Recent Labs   Lab Test  12/19/16   0827   CHOL  197   LDL  132*   HDL  52   TRIG  67     Most Recent 6 Bacteria Isolates From Any Culture (See EPIC Reports for  Culture Details):  Recent Labs   Lab Test  12/28/16   1518  12/28/16   1254  12/02/16   1554  06/03/15   2049  06/03/15   2043  04/04/13   1527   CULT  No growth after 14 days  No growth  No growth after 14 days  No growth  No growth  No growth  No growth  No Beta Streptococcus isolated     Most Recent TSH, T4 and A1c Labs:  Recent Labs   Lab Test  12/20/16   1121  12/19/16   0827   TSH  0.13*   --    T4  1.17   --    A1C   --   4.5

## 2017-01-23 NOTE — PLAN OF CARE
Problem: Goal Outcome Summary  Goal: Goal Outcome Summary  Outcome: No Change  BPs soft. OVSS on RA. Alert and oriented. Pt received scheduled dilaudid x2. At 0346 pt requested only 2mg of dilaudid stating that she takes 2mg at home which works well and 3mg makes her feel groggy. Pt declined 0600 dose of dilaudid stating she doesn't like the way it makes her feel. 5mg oxy given x2 for pain. Pt received scheduled compazine x1 for nausea. Pt declined 0600 dose of scheduled compazine stating her nausea is tolerable. Pt requests to receive antiemetic 30 mins before AM med administration.  and 85. Pt requesting to discuss insulin coverage/parameters with MD's. R port with D5NS @ 100ml/hr. G clamped, J with D10 @ 10ml/hr. Voiding, no BM this shift. Will continue to monitor and notify of any changes.

## 2017-01-24 ENCOUNTER — TELEPHONE (OUTPATIENT)
Dept: TRANSPLANT | Facility: CLINIC | Age: 52
End: 2017-01-24

## 2017-01-24 ENCOUNTER — CARE COORDINATION (OUTPATIENT)
Dept: CARDIOLOGY | Facility: CLINIC | Age: 52
End: 2017-01-24

## 2017-01-24 DIAGNOSIS — Z90.410 POST-PANCREATECTOMY DIABETES (H): Primary | ICD-10-CM

## 2017-01-24 DIAGNOSIS — E89.1 POST-PANCREATECTOMY DIABETES (H): Primary | ICD-10-CM

## 2017-01-24 DIAGNOSIS — E13.9 POST-PANCREATECTOMY DIABETES (H): Primary | ICD-10-CM

## 2017-01-24 NOTE — PROGRESS NOTES
Patient is a transplant patient and will be followed by the transplant team for follow up      Islets (Transplant 1)   Last reviewed by Cora Richardson on 12/29/2016    Phase: Transplanted (12/28/2016)   Status: Active Follow-up    Next review:     POD: 27 days             Care team: Mecca Watkins RN (Islet Coord) Self, MD Yves (Referring Physician)        Transplant Center: [facility] ([place], [place])       Protocols: Auto Islet     Forms      SOT EPISODE PROTOCOLS            Native organs dx:               Phase history: Effective Phase Status Reason Report   12/28/2016 Transplanted Active Follow-up  Visit Summary   11/30/2016 Evaluation Active Ready for Committee    12/2/2016 Referral Active  Visit Summary                 Transplant Note      Transplant note is empty.                       Events      Referred: 8/15/2016 Evaluated: 11/30/2016 Committee: 11/30/2016 UNOS qualified:      Center waitlisted:

## 2017-01-25 ENCOUNTER — TELEPHONE (OUTPATIENT)
Dept: TRANSPLANT | Facility: CLINIC | Age: 52
End: 2017-01-25

## 2017-01-25 ENCOUNTER — RESULTS ONLY (OUTPATIENT)
Dept: LAB | Age: 52
End: 2017-01-25

## 2017-01-25 LAB
ALBUMIN SERPL-MCNC: 3.3 G/DL (ref 3.4–5)
ALP SERPL-CCNC: 387 U/L (ref 40–150)
ALT SERPL W P-5'-P-CCNC: 99 U/L (ref 0–50)
ANION GAP SERPL CALCULATED.3IONS-SCNC: 6 MMOL/L (ref 3–14)
AST SERPL W P-5'-P-CCNC: 74 U/L (ref 0–45)
BASOPHILS # BLD AUTO: 0.1 10E9/L (ref 0–0.2)
BASOPHILS NFR BLD AUTO: 1.1 %
BILIRUB SERPL-MCNC: 0.3 MG/DL (ref 0.2–1.3)
BUN SERPL-MCNC: 24 MG/DL (ref 7–30)
CALCIUM SERPL-MCNC: 9 MG/DL (ref 8.5–10.1)
CHLORIDE SERPL-SCNC: 103 MMOL/L (ref 94–109)
CO2 SERPL-SCNC: 30 MMOL/L (ref 20–32)
CREAT SERPL-MCNC: 0.54 MG/DL (ref 0.52–1.04)
D DIMER PPP FEU-MCNC: 1.3 UG/ML FEU (ref 0–0.5)
DIFFERENTIAL METHOD BLD: ABNORMAL
EOSINOPHIL # BLD AUTO: 2.3 10E9/L (ref 0–0.7)
EOSINOPHIL NFR BLD AUTO: 29 %
ERYTHROCYTE [DISTWIDTH] IN BLOOD BY AUTOMATED COUNT: 13.4 % (ref 10–15)
GFR SERPL CREATININE-BSD FRML MDRD: ABNORMAL ML/MIN/1.7M2
GLUCOSE SERPL-MCNC: 103 MG/DL (ref 70–99)
HCT VFR BLD AUTO: 35.4 % (ref 35–47)
HGB BLD-MCNC: 11 G/DL (ref 11.7–15.7)
IMM GRANULOCYTES # BLD: 0 10E9/L (ref 0–0.4)
IMM GRANULOCYTES NFR BLD: 0.1 %
LYMPHOCYTES # BLD AUTO: 2.7 10E9/L (ref 0.8–5.3)
LYMPHOCYTES NFR BLD AUTO: 33.4 %
MCH RBC QN AUTO: 28.2 PG (ref 26.5–33)
MCHC RBC AUTO-ENTMCNC: 31.1 G/DL (ref 31.5–36.5)
MCV RBC AUTO: 91 FL (ref 78–100)
MONOCYTES # BLD AUTO: 0.9 10E9/L (ref 0–1.3)
MONOCYTES NFR BLD AUTO: 11.8 %
NEUTROPHILS # BLD AUTO: 2 10E9/L (ref 1.6–8.3)
NEUTROPHILS NFR BLD AUTO: 24.6 %
NRBC # BLD AUTO: 0 10*3/UL
NRBC BLD AUTO-RTO: 1 /100
PLATELET # BLD AUTO: 689 10E9/L (ref 150–450)
POTASSIUM SERPL-SCNC: 3.7 MMOL/L (ref 3.4–5.3)
PROT SERPL-MCNC: 7 G/DL (ref 6.8–8.8)
RBC # BLD AUTO: 3.9 10E12/L (ref 3.8–5.2)
SODIUM SERPL-SCNC: 140 MMOL/L (ref 133–144)
WBC # BLD AUTO: 8 10E9/L (ref 4–11)

## 2017-01-25 NOTE — TELEPHONE ENCOUNTER
Visited Dinora while she was in the [facility]. Attended to her GJ tube and was able to obtain new connectors from  that eliminated the leaking. Dinora is complaining of not being able to get good sleep at night. She wakes up for the 4 AM feeding and is unable to fall back to sleep. She has tried to solve this but is now requesting some help. Told her she can take Benadryl 25 mg as she says this has helped her in the past. Dinora stated that she wants to be able to lie down flat and stretch out but cannot as her reflux continues to be bothersome. She is currently taking Protonix 40 mg BID. Will talk with Dr. Phoenix for any potential intervention. Dinora reports having eaten a scrambled egg, some green bean, dry cereal that she chewed very well. She is asking if she can have meat. I told her lean meat and just a few bites to start. We reviewed taking enzymes PO with meals. She stated that she feels so much better now with the new GJ in but that she now has pain in her stomach area from the procedure. Explained that this should resolve itself within the next week and to monitor. Lantus dose today was 24 units. Dinora has no additional concerns.

## 2017-01-26 ENCOUNTER — TELEPHONE (OUTPATIENT)
Dept: TRANSPLANT | Facility: CLINIC | Age: 52
End: 2017-01-26

## 2017-01-26 DIAGNOSIS — K85.90 PANCREATITIS: ICD-10-CM

## 2017-01-26 DIAGNOSIS — Z90.410 HISTORY OF PANCREATECTOMY: Primary | ICD-10-CM

## 2017-01-26 LAB — A1AT SERPL-MCNC: 190 MG/DL (ref 80–200)

## 2017-01-27 ENCOUNTER — TELEPHONE (OUTPATIENT)
Dept: TRANSPLANT | Facility: CLINIC | Age: 52
End: 2017-01-27

## 2017-01-27 DIAGNOSIS — Z90.410 POST-PANCREATECTOMY DIABETES (H): Primary | ICD-10-CM

## 2017-01-27 DIAGNOSIS — E13.9 POST-PANCREATECTOMY DIABETES (H): Primary | ICD-10-CM

## 2017-01-27 DIAGNOSIS — E89.1 POST-PANCREATECTOMY DIABETES (H): Primary | ICD-10-CM

## 2017-01-27 DIAGNOSIS — Z90.410 ACQUIRED TOTAL ABSENCE OF PANCREAS: Primary | ICD-10-CM

## 2017-01-27 NOTE — TELEPHONE ENCOUNTER
Spoke with Stanley in the [facility] pharmacy regarding the Echo pen device prescription for Dinora. He will designate the device for Dinora as they have no additional pens.

## 2017-01-30 ENCOUNTER — TELEPHONE (OUTPATIENT)
Dept: TRANSPLANT | Facility: CLINIC | Age: 52
End: 2017-01-30

## 2017-01-30 DIAGNOSIS — E89.1 POST-PANCREATECTOMY DIABETES (H): Primary | ICD-10-CM

## 2017-01-30 DIAGNOSIS — K86.89 PANCREATIC INSUFFICIENCY: ICD-10-CM

## 2017-01-30 DIAGNOSIS — Z90.410 POST-PANCREATECTOMY DIABETES (H): Primary | ICD-10-CM

## 2017-01-30 DIAGNOSIS — E13.9 POST-PANCREATECTOMY DIABETES (H): Primary | ICD-10-CM

## 2017-01-30 NOTE — TELEPHONE ENCOUNTER
Spoke with Dinora to review appointments for tomorrow. She has no questions or concerns and is doing well at home.

## 2017-01-31 ENCOUNTER — OFFICE VISIT (OUTPATIENT)
Dept: TRANSPLANT | Facility: CLINIC | Age: 52
End: 2017-01-31
Attending: SURGERY
Payer: COMMERCIAL

## 2017-01-31 ENCOUNTER — TELEPHONE (OUTPATIENT)
Dept: TRANSPLANT | Facility: CLINIC | Age: 52
End: 2017-01-31

## 2017-01-31 ENCOUNTER — OFFICE VISIT (OUTPATIENT)
Dept: EDUCATION SERVICES | Facility: CLINIC | Age: 52
End: 2017-01-31

## 2017-01-31 VITALS
OXYGEN SATURATION: 99 % | SYSTOLIC BLOOD PRESSURE: 106 MMHG | WEIGHT: 153.2 LBS | DIASTOLIC BLOOD PRESSURE: 72 MMHG | HEIGHT: 68 IN | BODY MASS INDEX: 23.22 KG/M2 | TEMPERATURE: 98.2 F | HEART RATE: 92 BPM | RESPIRATION RATE: 14 BRPM

## 2017-01-31 DIAGNOSIS — Z90.410 POST-PANCREATECTOMY DIABETES (H): Primary | ICD-10-CM

## 2017-01-31 DIAGNOSIS — Z53.9 ERRONEOUS ENCOUNTER--DISREGARD: Primary | ICD-10-CM

## 2017-01-31 DIAGNOSIS — Z90.410 POST-PANCREATECTOMY DIABETES (H): ICD-10-CM

## 2017-01-31 DIAGNOSIS — K86.89 PANCREATIC INSUFFICIENCY: ICD-10-CM

## 2017-01-31 DIAGNOSIS — E89.1 POST-PANCREATECTOMY DIABETES (H): Primary | ICD-10-CM

## 2017-01-31 DIAGNOSIS — K85.90 PANCREATITIS: ICD-10-CM

## 2017-01-31 DIAGNOSIS — Z90.410 HISTORY OF PANCREATECTOMY: ICD-10-CM

## 2017-01-31 DIAGNOSIS — E13.9 POST-PANCREATECTOMY DIABETES (H): Primary | ICD-10-CM

## 2017-01-31 DIAGNOSIS — E13.9 POST-PANCREATECTOMY DIABETES (H): ICD-10-CM

## 2017-01-31 DIAGNOSIS — Z90.81 THROMBOCYTOSIS AFTER SPLENECTOMY: ICD-10-CM

## 2017-01-31 DIAGNOSIS — G89.18 POST-OPERATIVE PAIN: ICD-10-CM

## 2017-01-31 DIAGNOSIS — E89.1 POST-PANCREATECTOMY DIABETES (H): ICD-10-CM

## 2017-01-31 DIAGNOSIS — K86.89 PANCREATIC INSUFFICIENCY: Primary | ICD-10-CM

## 2017-01-31 DIAGNOSIS — R79.89 HIGH PLATELET COUNT: Primary | ICD-10-CM

## 2017-01-31 DIAGNOSIS — D75.838 THROMBOCYTOSIS AFTER SPLENECTOMY: ICD-10-CM

## 2017-01-31 LAB
ALBUMIN SERPL-MCNC: 3.4 G/DL (ref 3.4–5)
ALP SERPL-CCNC: 398 U/L (ref 40–150)
ALT SERPL W P-5'-P-CCNC: 126 U/L (ref 0–50)
ANION GAP SERPL CALCULATED.3IONS-SCNC: 7 MMOL/L (ref 3–14)
AST SERPL W P-5'-P-CCNC: 108 U/L (ref 0–45)
BASOPHILS # BLD AUTO: 0.1 10E9/L (ref 0–0.2)
BASOPHILS NFR BLD AUTO: 1.3 %
BILIRUB SERPL-MCNC: 0.3 MG/DL (ref 0.2–1.3)
BUN SERPL-MCNC: 27 MG/DL (ref 7–30)
CALCIUM SERPL-MCNC: 8.4 MG/DL (ref 8.5–10.1)
CHLORIDE SERPL-SCNC: 102 MMOL/L (ref 94–109)
CO2 SERPL-SCNC: 31 MMOL/L (ref 20–32)
CREAT SERPL-MCNC: 0.53 MG/DL (ref 0.52–1.04)
DIFFERENTIAL METHOD BLD: ABNORMAL
EOSINOPHIL # BLD AUTO: 1.2 10E9/L (ref 0–0.7)
EOSINOPHIL NFR BLD AUTO: 17.1 %
ERYTHROCYTE [DISTWIDTH] IN BLOOD BY AUTOMATED COUNT: 13 % (ref 10–15)
GFR SERPL CREATININE-BSD FRML MDRD: ABNORMAL ML/MIN/1.7M2
GLUCOSE SERPL-MCNC: 116 MG/DL (ref 70–99)
HCT VFR BLD AUTO: 34 % (ref 35–47)
HGB BLD-MCNC: 10.7 G/DL (ref 11.7–15.7)
IMM GRANULOCYTES # BLD: 0 10E9/L (ref 0–0.4)
IMM GRANULOCYTES NFR BLD: 0.1 %
LYMPHOCYTES # BLD AUTO: 2 10E9/L (ref 0.8–5.3)
LYMPHOCYTES NFR BLD AUTO: 29.3 %
MCH RBC QN AUTO: 28.4 PG (ref 26.5–33)
MCHC RBC AUTO-ENTMCNC: 31.5 G/DL (ref 31.5–36.5)
MCV RBC AUTO: 90 FL (ref 78–100)
MONOCYTES # BLD AUTO: 0.7 10E9/L (ref 0–1.3)
MONOCYTES NFR BLD AUTO: 10.3 %
NEUTROPHILS # BLD AUTO: 2.9 10E9/L (ref 1.6–8.3)
NEUTROPHILS NFR BLD AUTO: 41.9 %
NRBC # BLD AUTO: 0 10*3/UL
NRBC BLD AUTO-RTO: 0 /100
PLATELET # BLD AUTO: 476 10E9/L (ref 150–450)
POTASSIUM SERPL-SCNC: 4.2 MMOL/L (ref 3.4–5.3)
PREALB SERPL IA-MCNC: 25 MG/DL (ref 15–45)
PROT SERPL-MCNC: 6.9 G/DL (ref 6.8–8.8)
RBC # BLD AUTO: 3.77 10E12/L (ref 3.8–5.2)
SODIUM SERPL-SCNC: 140 MMOL/L (ref 133–144)
WBC # BLD AUTO: 6.8 10E9/L (ref 4–11)

## 2017-01-31 PROCEDURE — 84134 ASSAY OF PREALBUMIN: CPT | Performed by: SURGERY

## 2017-01-31 PROCEDURE — 82787 IGG 1 2 3 OR 4 EACH: CPT | Performed by: SURGERY

## 2017-01-31 PROCEDURE — 82784 ASSAY IGA/IGD/IGG/IGM EACH: CPT | Performed by: SURGERY

## 2017-01-31 PROCEDURE — 36415 COLL VENOUS BLD VENIPUNCTURE: CPT | Performed by: SURGERY

## 2017-01-31 PROCEDURE — 85025 COMPLETE CBC W/AUTO DIFF WBC: CPT | Performed by: SURGERY

## 2017-01-31 PROCEDURE — 80053 COMPREHEN METABOLIC PANEL: CPT | Performed by: SURGERY

## 2017-01-31 PROCEDURE — 99211 OFF/OP EST MAY X REQ PHY/QHP: CPT | Mod: ZF

## 2017-01-31 PROCEDURE — 17250 CHEM CAUT OF GRANLTJ TISSUE: CPT

## 2017-01-31 RX ORDER — LANSOPRAZOLE 30 MG/1
30 CAPSULE, DELAYED RELEASE ORAL DAILY
Qty: 30 CAPSULE | Refills: 11 | Status: SHIPPED | OUTPATIENT
Start: 2017-01-31 | End: 2018-01-12

## 2017-01-31 NOTE — Clinical Note
1/31/2017      RE: Dinora Mcghee  [address]  [facility] [id number]       Chronic Pancreatitis Group Progress Note    I had the pleasure of seeing Ms. Dinora Mcghee today. 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    Interval events since last visit with me:   ER visits = 1, reasons: GT retroflip  Inpatient admissions = 1, reasons same  The patient reports their current symptoms to be:   GI function:   Nausea:   [x]  none    []  rare    []  often    []  daily  Comment: nothing since Wednesday.  Vomiting: [x]  none    []  rare    []  often    []  daily   Comment:   Last BM: Today.  Stools are formed, frequency : daily.   Appetite: good  Tube feeds at 60 cc/hr, 24 hr cycle.  Oral food intake: 3 per day plus snack.  Unsure of calorie intake.  Yesterday: , soup, pudding, yogurt w fruit, hot cocoa, ensure clear.  Pancreatic exocrine insufficiency:   Takes (Creon 24), 1 with meals, 0 with snacks, 3 with 4 hr TF supply.  Greasy stools: [x]  none   []  rarely    []  several times/wk   []  daily      Comment: very sticky  Diabetes management:   Long acting insulin: Lantus--22 units/day  Short acting insulin: Novolog--1-3 units/day  Blood sugars typically range from 62 to 165.   A1C      4.5   12/19/2016  A1C      4.5   11/29/2016   Pain management:   Pain rating (0=no pain, 10=unbearable): 3 in day, 5 at night  Long acting agent: none   Short acting agent: oxycodone 5 mg every 5-6h prn.  Averaging about 5 per day  Daily 'Uptime': all day - no naps.    Walking: distance 1 miles, 5 times per week.  Learning to pace.      Prescription Medications as of 1/31/2017             insulin glargine (LANTUS) 100 UNIT/ML injection Inject 22 units subcutaneously every 8 AM.    Injection Device for insulin (NOVOPEN ECHO) MARCO 1 each daily as needed    blood glucose monitoring (ACCU-CHEK EMMY) test strip Use to test blood sugar 6 times daily or as directed.    insulin pen needle (BD  KEN U/F) 32G X 4 MM Use 6-8 times per day    insulin aspart (NOVOLOG PENFILL) 100 UNIT/ML injection Administer subcutaneously 0.5 unit per 15 grams of carbohydrates. Total 40-65 grams of carbohydrates per day.    amylase-lipase-protease (CREON 24) 48876 UNITS CPEP per EC capsule 3 capsules (72,000 Units) by Per J Tube route 3 times daily (with meals)    amylase-lipase-protease (CREON 24) 51588 UNITS CPEP per EC capsule Take 1 capsule (24,000 Units) by mouth as needed    insulin aspart (NOVOLOG PEN) 100 UNIT/ML injection Insulin to carbohydrate ratio: 1 unit per 15 grams CHO.    insulin aspart (NOVOLOG PEN) 100 UNIT/ML injection aspart medium correction 1 unit per 50 > 120 every 4 hours    For Pre-Meal  - 169 give 1 unit.   For Pre-Meal  - 219 give 2 units.   For Pre-Meal  - 269 give 3 units.   For Pre-Meal  - 319 give 4 units.   For Pre-Meal -369 give 5 units.  For Pre-Meal BG > 370 give 6 units.    amitriptyline (ELAVIL) 2 MG/ML solution 10 mLs (20 mg) by Per J Tube route At Bedtime    levothyroxine (SYNTHROID) 25 mcg/mL 5 mLs (125 mcg) by Per J Tube route every morning (before breakfast)    multivitamins CF formula (MVW COMPLETE FORMULATION) CAPS capsule Take 1 capsule by mouth daily    pantoprazole (PROTONIX) 2 mg/mL suspension 20 mLs (40 mg) by Per J Tube route 2 times daily    sodium bicarbonate 325 MG tablet 1 tablet (325 mg) by Per J Tube route every 4 hours    pregabalin (LYRICA) 20 MG/ML solution 2.5 mLs (50 mg) by Per J Tube route 2 times daily    sennosides (SENOKOT) 8.8 MG/5ML syrup 10 mLs by Per Feeding Tube route every morning    docusate (COLACE) 50 MG/5ML liquid 5 mLs (50 mg) by Per J Tube route daily    aspirin 325 MG tablet Take 1 tablet (325 mg) by mouth daily Take 1 aspirin, 325 mg. Crush the aspirin and administer via jejunal tube. Flush with 30 cc of free water.    magnesium hydroxide (MILK OF MAGNESIA) 400 MG/5ML suspension 30 mLs by Per J Tube route daily     HYDROmorphone (DILAUDID) 1 MG/ML LIQD liquid 3 mLs (3 mg) by Per Feeding Tube route every 3 hours    LORazepam (ATIVAN) 2 MG/ML (HIGH CONC) solution Take 0.25 mLs (0.5 mg) by mouth every 6 hours as needed for anxiety, nausea or vomiting    glucose 40 % GEL gel Take 15-30 g by mouth every 15 minutes as needed for low blood sugar    promethazine (PHENERGAN) 6.25 MG/5ML syrup 10 mLs (12.5 mg) by Per Feeding Tube route every 4 hours as needed for nausea    prochlorperazine (COMPAZINE) 5 MG tablet 1 tablet (5 mg) by Per J Tube route every 6 hours    Sharps Container (BD SHARPS ) MISC 1 Container as needed    blood glucose monitoring (ACCU-CHEK MULTICLIX) lancets Use up to 6 times daily    blood glucose monitoring (ACCU-CHEK EMMY PLUS) meter device kit 1 kit by In Vitro route every 4 hours Use to test blood sugar 6 times daily or as directed.    Or whatever is covered    blood glucose monitoring ([place] CONTOUR NEXT) test strip Use to test blood sugar 6-8 times daily or as directed.    blood glucose monitoring (PAT MICROLET) lancets Use to test blood sugar 6-8 times daily or as directed.        Physical exam:   General Appearance: in no apparent distress.   Temp:  [98.2  F (36.8  C)] 98.2  F (36.8  C)  Pulse:  [92] 92  Resp:  [14] 14  BP: (106)/(72) 106/72 mmHg  SpO2:  [99 %] 99 %   Skin: Normal, no rashes or jaundice  Heart: Regular rhythm.  Lungs: no audible wheezes or increased work of breathing.  Abdomen: The abdomen is rounded, and the wound is Healing well, without hernia.  Tube exit site is clean. The abdomen is non-tender, generalized.    Edema: absent.    Chronic Pancreatitis Latest Ref Rng 1/17/2017 1/22/2017 1/23/2017 1/25/2017 1/31/2017   Weight - - 63.504 kg 68.13 kg - 69.491 kg   AMYLASE 30 - 110 U/L - 54 45 - -   LIPASE 73 - 393 U/L - 32(L) 35(L) - -   HEMOGLOBIN A1C 4.3 - 6.0 % - - - - -   C PEPTIDE 0.9 - 6.9 ng/mL - - - - -   GLUCOSE 70 - 99 mg/dL 98 95 80 103(H) 116(H)   HGB 11.7 - 15.7 g/dL  10.5(L) 11.2(L) 9.4(L) 11.0(L) 10.7(L)    - 450 10e9/L 1234(HH) 834(H) 674(H) 689(H) 476(H)   ALBUMIN 3.4 - 5.0 g/dL 3.4 3.3(L) - 3.3(L) 3.4   PREALBUMIN 15 - 45 mg/dL 25 - - - 25       Assessment and Plan: She is doing well s/p TP-IAT.  Interval progress has been good.  Postoperative gastric ileus: resolving.  Will advance diet as tolerated.    Pancreatic exocrine insufficiency: fairly controlled.  Will increase oral creon to 2-3 with meals, 1 with snacks, and retain 3 per 4 hr TF cycle.  Malnutrition: not a problem.  Will decrease tube feeds to nocturnal.  Silver nitrate applied to small area of symptomatic hypertrophic granulation tissue.  OK to finish out liquid meds and new refills will be tabs.  Diabetes mellitus: Will start NPH per Dr. Melissa and need to adjust Lantus.  Abdominal pain management: continue oxycodone, but ok to take 2 tabs at bedtime.  Will wean ativan to off if not helpful in combo with oxycodone.  Thrombocytosis: switch to baby aspirin.   Followup: I will see her again in clinic in 2  weeks with chem 20, prealbumin, CBC w platelets.    Total time: 25 min, Counselling Time: 20 min.        Nestor Phoenix MD  Department of Surgery

## 2017-01-31 NOTE — PROGRESS NOTES
[facility] Progress Note    I had the pleasure of seeing Ms. Dinora Mcghee today. 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    Interval events since last visit with me:   ER visits = 1, reasons: GT retroflip  Inpatient admissions = 1, reasons same  The patient reports their current symptoms to be:   GI function:   Nausea:   [x]  none    []  rare    []  often    []  daily  Comment: nothing since Wednesday.  Vomiting: [x]  none    []  rare    []  often    []  daily   Comment:   Last BM: Today.  Stools are formed, frequency : daily.   Appetite: good  Tube feeds at 60 cc/hr, 24 hr cycle.  Oral food intake: 3 per day plus snack.  Unsure of calorie intake.  Yesterday: , soup, pudding, yogurt w fruit, hot cocoa, ensure clear.  Pancreatic exocrine insufficiency:   Takes (Creon 24), 1 with meals, 0 with snacks, 3 with 4 hr TF supply.  Greasy stools: [x]  none   []  rarely    []  several times/wk   []  daily      Comment: very sticky  Diabetes management:   Long acting insulin: Lantus--22 units/day  Short acting insulin: Novolog--1-3 units/day  Blood sugars typically range from 62 to 165.   A1C      4.5   12/19/2016  A1C      4.5   11/29/2016   Pain management:   Pain rating (0=no pain, 10=unbearable): 3 in day, 5 at night  Long acting agent: none   Short acting agent: oxycodone 5 mg every 5-6h prn.  Averaging about 5 per day  Daily 'Uptime': all day - no naps.    Walking: distance 1 miles, 5 times per week.  Learning to pace.      Prescription Medications as of 1/31/2017             insulin glargine (LANTUS) 100 UNIT/ML injection Inject 22 units subcutaneously every 8 AM.    Injection Device for insulin (NOVOPEN ECHO) MARCO 1 each daily as needed    blood glucose monitoring (ACCU-CHEK EMMY) test strip Use to test blood sugar 6 times daily or as directed.    insulin pen needle (BD KEN U/F) 32G X 4 MM Use 6-8 times per day    insulin aspart (NOVOLOG PENFILL) 100  UNIT/ML injection Administer subcutaneously 0.5 unit per 15 grams of carbohydrates. Total 40-65 grams of carbohydrates per day.    amylase-lipase-protease (CREON 24) 49818 UNITS CPEP per EC capsule 3 capsules (72,000 Units) by Per J Tube route 3 times daily (with meals)    amylase-lipase-protease (CREON 24) 32489 UNITS CPEP per EC capsule Take 1 capsule (24,000 Units) by mouth as needed    insulin aspart (NOVOLOG PEN) 100 UNIT/ML injection Insulin to carbohydrate ratio: 1 unit per 15 grams CHO.    insulin aspart (NOVOLOG PEN) 100 UNIT/ML injection aspart medium correction 1 unit per 50 > 120 every 4 hours    For Pre-Meal  - 169 give 1 unit.   For Pre-Meal  - 219 give 2 units.   For Pre-Meal  - 269 give 3 units.   For Pre-Meal  - 319 give 4 units.   For Pre-Meal -369 give 5 units.  For Pre-Meal BG > 370 give 6 units.    amitriptyline (ELAVIL) 2 MG/ML solution 10 mLs (20 mg) by Per J Tube route At Bedtime    levothyroxine (SYNTHROID) 25 mcg/mL 5 mLs (125 mcg) by Per J Tube route every morning (before breakfast)    multivitamins CF formula (MVW COMPLETE FORMULATION) CAPS capsule Take 1 capsule by mouth daily    pantoprazole (PROTONIX) 2 mg/mL suspension 20 mLs (40 mg) by Per J Tube route 2 times daily    sodium bicarbonate 325 MG tablet 1 tablet (325 mg) by Per J Tube route every 4 hours    pregabalin (LYRICA) 20 MG/ML solution 2.5 mLs (50 mg) by Per J Tube route 2 times daily    sennosides (SENOKOT) 8.8 MG/5ML syrup 10 mLs by Per Feeding Tube route every morning    docusate (COLACE) 50 MG/5ML liquid 5 mLs (50 mg) by Per J Tube route daily    aspirin 325 MG tablet Take 1 tablet (325 mg) by mouth daily Take 1 aspirin, 325 mg. Crush the aspirin and administer via jejunal tube. Flush with 30 cc of free water.    magnesium hydroxide (MILK OF MAGNESIA) 400 MG/5ML suspension 30 mLs by Per J Tube route daily    HYDROmorphone (DILAUDID) 1 MG/ML LIQD liquid 3 mLs (3 mg) by Per Feeding Tube route  every 3 hours    LORazepam (ATIVAN) 2 MG/ML (HIGH CONC) solution Take 0.25 mLs (0.5 mg) by mouth every 6 hours as needed for anxiety, nausea or vomiting    glucose 40 % GEL gel Take 15-30 g by mouth every 15 minutes as needed for low blood sugar    promethazine (PHENERGAN) 6.25 MG/5ML syrup 10 mLs (12.5 mg) by Per Feeding Tube route every 4 hours as needed for nausea    prochlorperazine (COMPAZINE) 5 MG tablet 1 tablet (5 mg) by Per J Tube route every 6 hours    Sharps Container (BD SHARPS ) MISC 1 Container as needed    blood glucose monitoring (ACCU-CHEK MULTICLIX) lancets Use up to 6 times daily    blood glucose monitoring (ACCU-CHEK EMMY PLUS) meter device kit 1 kit by In Vitro route every 4 hours Use to test blood sugar 6 times daily or as directed.    Or whatever is covered    blood glucose monitoring ([employer] CONTOUR NEXT) test strip Use to test blood sugar 6-8 times daily or as directed.    blood glucose monitoring (PAT MICROLET) lancets Use to test blood sugar 6-8 times daily or as directed.        Physical exam:   General Appearance: in no apparent distress.   Temp:  [98.2  F (36.8  C)] 98.2  F (36.8  C)  Pulse:  [92] 92  Resp:  [14] 14  BP: (106)/(72) 106/72 mmHg  SpO2:  [99 %] 99 %   Skin: Normal, no rashes or jaundice  Heart: Regular rhythm.  Lungs: no audible wheezes or increased work of breathing.  Abdomen: The abdomen is rounded, and the wound is Healing well, without hernia.  Tube exit site is clean. The abdomen is non-tender, generalized.    Edema: absent.    Chronic Pancreatitis Latest Ref Rng 1/17/2017 1/22/2017 1/23/2017 1/25/2017 1/31/2017   Weight - - 63.504 kg 68.13 kg - 69.491 kg   AMYLASE 30 - 110 U/L - 54 45 - -   LIPASE 73 - 393 U/L - 32(L) 35(L) - -   HEMOGLOBIN A1C 4.3 - 6.0 % - - - - -   C PEPTIDE 0.9 - 6.9 ng/mL - - - - -   GLUCOSE 70 - 99 mg/dL 98 95 80 103(H) 116(H)   HGB 11.7 - 15.7 g/dL 10.5(L) 11.2(L) 9.4(L) 11.0(L) 10.7(L)    - 450 10e9/L 1234(HH) 834(H) 674(H)  689(H) 476(H)   ALBUMIN 3.4 - 5.0 g/dL 3.4 3.3(L) - 3.3(L) 3.4   PREALBUMIN 15 - 45 mg/dL 25 - - - 25       Assessment and Plan: She is doing well s/p TP-IAT.  Interval progress has been good.  Postoperative gastric ileus: resolving.  Will advance diet as tolerated.    Pancreatic exocrine insufficiency: fairly controlled.  Will increase oral creon to 2-3 with meals, 1 with snacks, and retain 3 per 4 hr TF cycle.  Malnutrition: not a problem.  Will decrease tube feeds to nocturnal.  Silver nitrate applied to small area of symptomatic hypertrophic granulation tissue.  OK to finish out liquid meds and new refills will be tabs.  Diabetes mellitus: Will start NPH per Dr. Melissa and need to adjust Lantus.  Abdominal pain management: continue oxycodone, but ok to take 2 tabs at bedtime.  Will wean ativan to off if not helpful in combo with oxycodone.  Thrombocytosis: switch to baby aspirin.   Followup: I will see her again in clinic in 2  weeks with chem 20, prealbumin, CBC w platelets.    Total time: 25 min, Counselling Time: 20 min.        Nestor Phoenix MD  Department of Surgery

## 2017-01-31 NOTE — NURSING NOTE
"Chief Complaint   Patient presents with     TP-IAT Transplant     POD 34       Initial /72 mmHg  Pulse 92  Temp(Src) 98.2  F (36.8  C) (Oral)  Resp 14  Ht 1.727 m (5' 8\")  Wt 69.491 kg (153 lb 3.2 oz)  BMI 23.30 kg/m2  SpO2 99% Estimated body mass index is 23.3 kg/(m^2) as calculated from the following:    Height as of this encounter: 1.727 m (5' 8\").    Weight as of this encounter: 69.491 kg (153 lb 3.2 oz).  BP completed using cuff size: regular    "

## 2017-02-01 ENCOUNTER — TELEPHONE (OUTPATIENT)
Dept: TRANSPLANT | Facility: CLINIC | Age: 52
End: 2017-02-01

## 2017-02-01 NOTE — TELEPHONE ENCOUNTER
Received a call from Dinora. She reports feeling a bit nauseated and clammy thinking it maybe related to her med's being transitioned to oral. She started her new PPI today and adjusted ASA dose from 325 mg to 81 mg. She reports eating her usual breakfast of yogurt, granola, and pears. This RN requested she check her blood sugar, which was 118. She will lie down and take something for nausea to see if this helps. She reports having slept really well last night and woke up feeling refreshed. Will follow. Her  is with her.

## 2017-02-01 NOTE — PROGRESS NOTES
Dinora Mcghee presents today for education related to diabetes secondary to total pancreatectomy performed 5 weeks ago  Dinora Mcghee is accompanied by self    ASSESSMENT:  Patient glucose self monitoring as follows: Every four hours       Current Insulin Regimen:  Lantus:  22units  With Novolog ICR of 0.5 units per 15 grams CHO  Novolog correction scale of 1/50/120        EDUCATION and INSTRUCTION PROVIDED AT THIS VISIT:       Reviewed blood glucoses and answered patient questions  Reviewed injection technique and made corrections as needed.  Discussed how an insulin to carbohydrate ratio works along with a correction scale.    Discussed how to use NPH insulin, which may be prescribed for her night time tube feedings. Shown how to use the Humulin N insulin pen. Explained that once she is switched to night time tube feedings, she will also have her Lantus dose reduced as well.    Reviewed carb counting with her and discussed coverage with insulin.  She is currently taking her insulin after her meal, rather than before, because she is unsure about how much insulin to take.  She was also wondering if she needs to restrict carbohydrates and buy all sugar free stuff.  Discussed healthy diet, and importance of covering carbs eaten, rather than trying to restrict carbs.  She also has a follow up appointment with Diana Gutierrez on Feb 6 to discuss.     Patient-stated goal written and given to Dinora Mcghee.  Verbalized and demonstrated understanding of instructions.     PLAN:  See patient instructions  AVS printed and given to patient    FOLLOW-UP:        Time spent with patient at today's visit was 60 minutes.      Any diabetes medication dose changes were made via the CDE Protocol and Collaborative Practice Agreement with  physicians..  A copy of this encounter was provided to patient's referring provider.

## 2017-02-01 NOTE — TELEPHONE ENCOUNTER
Spoke with Dinora. She is feeling much better after taking a 2 hour nap this morning. She then went to the gym and worked out on a treadmill. She reports no issues or concerns at this time. Felt it may have been related to taking oral medications after transitioning from liquid medications.

## 2017-02-02 DIAGNOSIS — E89.1 POST-PANCREATECTOMY DIABETES (H): Primary | ICD-10-CM

## 2017-02-02 DIAGNOSIS — Z90.410 POST-PANCREATECTOMY DIABETES (H): Primary | ICD-10-CM

## 2017-02-02 DIAGNOSIS — E13.9 POST-PANCREATECTOMY DIABETES (H): Primary | ICD-10-CM

## 2017-02-02 DIAGNOSIS — K86.89 PANCREATIC INSUFFICIENCY: ICD-10-CM

## 2017-02-03 ENCOUNTER — TELEPHONE (OUTPATIENT)
Dept: TRANSPLANT | Facility: CLINIC | Age: 52
End: 2017-02-03

## 2017-02-03 LAB
IGG SERPL-MCNC: 770 MG/DL (ref 695–1620)
IGG1 SER-MCNC: 394 MG/DL (ref 300–856)
IGG2 SER-MCNC: 324 MG/DL (ref 158–761)
IGG3 SER-MCNC: 75 MG/DL (ref 24–192)
IGG4 SER-MCNC: 106 MG/DL (ref 11–86)

## 2017-02-03 NOTE — TELEPHONE ENCOUNTER
LVM for Dinora asking her to call and let me know how her overnight tube feeds/new Insulin/BS were last night. Left call back number.

## 2017-02-03 NOTE — TELEPHONE ENCOUNTER
Return voice mail from Dinora. No issues overnight, she has been in touch with Dr Melissa this am.

## 2017-02-06 ENCOUNTER — OFFICE VISIT (OUTPATIENT)
Dept: EDUCATION SERVICES | Facility: CLINIC | Age: 52
End: 2017-02-06

## 2017-02-06 VITALS — HEIGHT: 68 IN | WEIGHT: 154.5 LBS | BODY MASS INDEX: 23.42 KG/M2

## 2017-02-06 DIAGNOSIS — E13.9 POST-PANCREATECTOMY DIABETES (H): Primary | ICD-10-CM

## 2017-02-06 DIAGNOSIS — E89.1 POST-PANCREATECTOMY DIABETES (H): Primary | ICD-10-CM

## 2017-02-06 DIAGNOSIS — Z90.410 POST-PANCREATECTOMY DIABETES (H): Primary | ICD-10-CM

## 2017-02-06 NOTE — PATIENT INSTRUCTIONS
1. Healthy low saturated/trans fat diet review  2. Carbohydrate counting review  3. Low blood sugar review  4. Exercise and carb replacement review  5. Follow up as needed  Diana Gutierrez RD, LD, CDE  Diabetes Care  [address]  [address] [place], [place] [id number]  Phone: [phone]  Appointment line: [phone]  Email: [email or web address]

## 2017-02-06 NOTE — PROGRESS NOTES
DIABETES EDUCATION NOTE:    Dinora Mcghee presents today for education related to post-pancreatectomy diabetes  Patient is being treated with:  insulin  She is accompanied by self and  Shaan    PATIENT CONCERNS RELATED TO DIABETES SELF MANAGEMENT:     Here for instruction in the use of the Glucagon emergency kit.  She is currently taking 5 units of Lantus in the AM, 8 units of NPH in the evening with tube feeding, and Novolog 1/2 unit per 15 grams CHO, and a correction scale of 1/50/150.                               EDUCATION and INSTRUCTION PROVIDED AT THIS VISIT:      Discussed the following aspects of glucagon administration:        Appropriate situation in which it should be administered        Try to check BG first and call 911 before getting glucagon kit        Drawing up glucagon, mixing diluent and medication        Administration into a large muscle, deltoid or quadriceps        Expected effects and aspiration precautions.    Given instructions for the phone application and given written instructions as well.             Patient-stated goal written and given to Dinora Mcghee.  Verbalized and demonstrated understanding of instructions.     PLAN:  See patient instructions  AVS printed and given to patient    FOLLOW-UP:        Time spent with patient at today's visit was 30 minutes.      Any diabetes medication dose changes were made via the CDE Protocol and Collaborative Practice Agreement with [place] and [facility].  A copy of this encounter was provided to patient's referring provider.

## 2017-02-06 NOTE — PROGRESS NOTES
"  Diabetes Self Management Training: Individual Review Visit    Dinora Mcghee presents today for education related to post pancreatectomy diabetes, s/p TPAIT 12/29/16.    She is accompanied by spouse    Patient Problem List and Family Medical History reviewed for relevant medical history, current medical status, and diabetes risk factors.    Current Diabetes Management per Patient:  Taking diabetes medications?   yes:     Diabetes Medication(s)     Diabetic Other Sig    glucagon (GLUCAGON EMERGENCY) 1 MG kit Inject 1 mg into the muscle once for 1 dose    glucose 40 % GEL gel Take 15-30 g by mouth every 15 minutes as needed for low blood sugar    Insulin Sig    insulin glargine (LANTUS) 100 UNIT/ML injection Starting 2/3/17, inject 7 units Lantus subcutaneously every 8 AM after turning off your tube feeds.    insulin isophane human (HUMULIN N PEN) 100 UNIT/ML injection Starting 2/2/17: Administer 7 units of NPH insulin subcutaneously each night at 8PM, the beginning of your 12 hour tube feeds. Make sure your tube feeds are running properly before giving your 7 units of Humulin N.    insulin aspart (NOVOLOG PENFILL) 100 UNIT/ML injection Administer subcutaneously 0.5 unit per 15 grams of carbohydrates. Total 40-65 grams of carbohydrates per day.    insulin aspart (NOVOLOG PEN) 100 UNIT/ML injection Insulin to carbohydrate ratio: 1 unit per 15 grams CHO.    insulin aspart (NOVOLOG PEN) 100 UNIT/ML injection aspart medium correction 1 unit per 50 > 120 every 4 hours    For Pre-Meal  - 169 give 1 unit.   For Pre-Meal  - 219 give 2 units.   For Pre-Meal  - 269 give 3 units.   For Pre-Meal  - 319 give 4 units.   For Pre-Meal -369 give 5 units.  For Pre-Meal BG > 370 give 6 units.          Past Diabetes Education: Newly diagnosed    Patient glucose self monitoring as follows: All bg results reviewed by Dr. Melissa today, see her note for summary.       Vitals:  Ht 1.727 m (5' 8\")  Wt " "70.081 kg (154 lb 8 oz)  BMI 23.50 kg/m2  Estimated body mass index is 23.5 kg/(m^2) as calculated from the following:    Height as of this encounter: 1.727 m (5' 8\").    Weight as of this encounter: 70.081 kg (154 lb 8 oz).   Last 3 BP:   BP Readings from Last 3 Encounters:   17 106/72   17 128/76   17 107/66     History   Smoking status     Former Smoker -- 0.50 packs/day for 6 years     Types: Cigarettes     Start date: 1985     Quit date: 1992   Smokeless tobacco     Never Used     Comment: no 2nd hand       Labs:  A1C      4.5   2016  GLC      116   2017  LDL      132   2016  HDL CHOLESTEROL   Date Value Ref Range Status   2016 52 >49 mg/dL Final   ]  GFR ESTIMATE   Date Value Ref Range Status   2017 >90  Non  GFR Calc   >60 mL/min/1.7m2 Final     GFR ESTIMATE IF BLACK   Date Value Ref Range Status   2017 >90   GFR Calc   >60 mL/min/1.7m2 Final     CR     0.53   2017  No results found for this basename: microalbumin    Nutrition Review:  MEt with Dinora and her  today for diabetes education for s/p TPAIT 16.Dinora is now on nocturnal tube feedings and is eating a regular diet. She brings in food records for review today. She takes 5 units Lantus, Novolo.5/15 grams carbohydrate and 8 units NPH prior to her tube feeding. She was previously on tube feedings prior to her TPAIT since 2016.    Diet Recall:   Breakfast:granola/pear/yogurt or egg/s ramos/toast or rice chex/eggs  Lunch:bread with pb and jelly or tortilla/turkey/lettuc or soup or Ensure clear , sugar free cranberry juice  PM snack:fruit or saeed crackers/pb  Dinner:steak/potato/sour cream/buttter or veggie burger, cheese curds, turkey taco, cookie, prunes, hot cocoa      Physical Activity:    Walking 40 minutes at the [facility] or outside recently      Gave Dinora written and verbal information on general healthy eating, low sat/trans fat " & carbohydrate counting. Reviewed how to access nutrition information/carbohydrates when eating out in restaurants using phone apps and other web sites. Discussed low fat low fiber diet for gastroparesis if she should need to follow the diet, however currently she tells me she is not having any symptoms since eating again. Reviewed low bg rx and exercise and carbohydrate replacement rec's. Asked many questions.      Education provided today on:  AADE Self-Care Behaviors:  Healthy Eating: carbohydrate counting, heart healthy diet, eating out and label reading  Being Active: relationship to blood glucose, describe appropriate activity program and precautions to take  Problem Solving: low blood glucose - causes, signs/symptoms, treatment and prevention and carrying a carbohydrate source at all times    Pt verbalized understanding of concepts discussed and recommendations provided today.         ASSESSMENT: Verbalized recommendations today. Dinora is tolerating a regular diet without GI symptoms per report and per her report will most likely d/c her nocturnal tube feedings in a few days. Will f/u as needed.       PLAN:  See Patient Instructions for co-developed, patient-stated behavior change goals.  AVS printed and provided to patient today.    FOLLOW-UP:  Follow-up as needed.   Chart routed to referring provider.    Time Spent: 60 minutes  Encounter Type: Individual    Any diabetes medication dose changes were made via the CDE Protocol and Collaborative Practice Agreement with the patient's referring provider. A copy of this encounter was shared with the provider.

## 2017-02-07 ENCOUNTER — TELEPHONE (OUTPATIENT)
Dept: TRANSPLANT | Facility: CLINIC | Age: 52
End: 2017-02-07

## 2017-02-09 ENCOUNTER — TELEPHONE (OUTPATIENT)
Dept: TRANSPLANT | Facility: CLINIC | Age: 52
End: 2017-02-09

## 2017-02-10 ENCOUNTER — OFFICE VISIT (OUTPATIENT)
Dept: TRANSPLANT | Facility: CLINIC | Age: 52
End: 2017-02-10
Attending: SURGERY
Payer: COMMERCIAL

## 2017-02-10 ENCOUNTER — TELEPHONE (OUTPATIENT)
Dept: TRANSPLANT | Facility: CLINIC | Age: 52
End: 2017-02-10

## 2017-02-10 VITALS
DIASTOLIC BLOOD PRESSURE: 65 MMHG | HEART RATE: 92 BPM | RESPIRATION RATE: 16 BRPM | SYSTOLIC BLOOD PRESSURE: 105 MMHG | OXYGEN SATURATION: 100 % | TEMPERATURE: 98 F

## 2017-02-10 DIAGNOSIS — E13.9 POST-PANCREATECTOMY DIABETES (H): ICD-10-CM

## 2017-02-10 DIAGNOSIS — K86.89 PANCREATIC INSUFFICIENCY: ICD-10-CM

## 2017-02-10 DIAGNOSIS — E89.1 POST-PANCREATECTOMY DIABETES (H): Primary | ICD-10-CM

## 2017-02-10 DIAGNOSIS — E13.9 POST-PANCREATECTOMY DIABETES (H): Primary | ICD-10-CM

## 2017-02-10 DIAGNOSIS — Z93.4 JEJUNOSTOMY TUBE PRESENT (H): ICD-10-CM

## 2017-02-10 DIAGNOSIS — Z90.410 POST-PANCREATECTOMY DIABETES (H): ICD-10-CM

## 2017-02-10 DIAGNOSIS — K86.89 PANCREATIC INSUFFICIENCY: Primary | ICD-10-CM

## 2017-02-10 DIAGNOSIS — Z90.410 POST-PANCREATECTOMY DIABETES (H): Primary | ICD-10-CM

## 2017-02-10 DIAGNOSIS — R10.10 PAIN OF UPPER ABDOMEN: ICD-10-CM

## 2017-02-10 DIAGNOSIS — G89.18 ACUTE POST-OPERATIVE PAIN: ICD-10-CM

## 2017-02-10 DIAGNOSIS — E89.1 POST-PANCREATECTOMY DIABETES (H): ICD-10-CM

## 2017-02-10 LAB
ALBUMIN SERPL-MCNC: 3.2 G/DL (ref 3.4–5)
ALP SERPL-CCNC: 546 U/L (ref 40–150)
ALT SERPL W P-5'-P-CCNC: 248 U/L (ref 0–50)
ANION GAP SERPL CALCULATED.3IONS-SCNC: 8 MMOL/L (ref 3–14)
AST SERPL W P-5'-P-CCNC: 175 U/L (ref 0–45)
BASOPHILS # BLD AUTO: 0.1 10E9/L (ref 0–0.2)
BASOPHILS NFR BLD AUTO: 1.3 %
BILIRUB SERPL-MCNC: 0.4 MG/DL (ref 0.2–1.3)
BUN SERPL-MCNC: 21 MG/DL (ref 7–30)
CALCIUM SERPL-MCNC: 9 MG/DL (ref 8.5–10.1)
CHLORIDE SERPL-SCNC: 101 MMOL/L (ref 94–109)
CO2 SERPL-SCNC: 31 MMOL/L (ref 20–32)
CREAT SERPL-MCNC: 0.61 MG/DL (ref 0.52–1.04)
DIFFERENTIAL METHOD BLD: ABNORMAL
EOSINOPHIL # BLD AUTO: 0.7 10E9/L (ref 0–0.7)
EOSINOPHIL NFR BLD AUTO: 10.6 %
ERYTHROCYTE [DISTWIDTH] IN BLOOD BY AUTOMATED COUNT: 12.8 % (ref 10–15)
GFR SERPL CREATININE-BSD FRML MDRD: ABNORMAL ML/MIN/1.7M2
GLUCOSE SERPL-MCNC: 87 MG/DL (ref 70–99)
HCT VFR BLD AUTO: 35 % (ref 35–47)
HGB BLD-MCNC: 10.8 G/DL (ref 11.7–15.7)
IMM GRANULOCYTES # BLD: 0 10E9/L (ref 0–0.4)
IMM GRANULOCYTES NFR BLD: 0.1 %
LYMPHOCYTES # BLD AUTO: 2.3 10E9/L (ref 0.8–5.3)
LYMPHOCYTES NFR BLD AUTO: 34.8 %
MCH RBC QN AUTO: 27.8 PG (ref 26.5–33)
MCHC RBC AUTO-ENTMCNC: 30.9 G/DL (ref 31.5–36.5)
MCV RBC AUTO: 90 FL (ref 78–100)
MONOCYTES # BLD AUTO: 1 10E9/L (ref 0–1.3)
MONOCYTES NFR BLD AUTO: 14.6 %
NEUTROPHILS # BLD AUTO: 2.6 10E9/L (ref 1.6–8.3)
NEUTROPHILS NFR BLD AUTO: 38.6 %
NRBC # BLD AUTO: 0 10*3/UL
NRBC BLD AUTO-RTO: 0 /100
PLATELET # BLD AUTO: 627 10E9/L (ref 150–450)
POTASSIUM SERPL-SCNC: 4 MMOL/L (ref 3.4–5.3)
PREALB SERPL IA-MCNC: 20 MG/DL (ref 15–45)
PROT SERPL-MCNC: 6.9 G/DL (ref 6.8–8.8)
RBC # BLD AUTO: 3.89 10E12/L (ref 3.8–5.2)
SODIUM SERPL-SCNC: 141 MMOL/L (ref 133–144)
WBC # BLD AUTO: 6.7 10E9/L (ref 4–11)

## 2017-02-10 PROCEDURE — 36415 COLL VENOUS BLD VENIPUNCTURE: CPT | Performed by: SURGERY

## 2017-02-10 PROCEDURE — 84134 ASSAY OF PREALBUMIN: CPT | Performed by: SURGERY

## 2017-02-10 PROCEDURE — 80053 COMPREHEN METABOLIC PANEL: CPT | Performed by: SURGERY

## 2017-02-10 PROCEDURE — 85025 COMPLETE CBC W/AUTO DIFF WBC: CPT | Performed by: SURGERY

## 2017-02-10 PROCEDURE — 99211 OFF/OP EST MAY X REQ PHY/QHP: CPT | Mod: ZF

## 2017-02-10 RX ORDER — OXYCODONE HYDROCHLORIDE 5 MG/1
5 TABLET ORAL EVERY 4 HOURS PRN
Qty: 210 TABLET | Refills: 0
Start: 2017-02-10 | End: 2017-02-21

## 2017-02-10 NOTE — TELEPHONE ENCOUNTER
Received a text message from Dinora this morning at 6:11 AM. She felt that her Jejunal tube was in the process of flipping. She was not tasting formula or venting formula yet. She is c/o significant itching and pain in the same upper abdomen and back as the previous occasions. She is c/o nausea and no appetite and a lot of pain. Dinora did contact Shantell the on-call Coordinator at 4:00 AM today and the decision was for Dinora to drive to the [place] with the hope of getting into clinic to see Dr. Phoenix.  This RN notified Dr. Phoenix and the plan was for Dinora to have an abdominal x-ray first, (order placed by Justa Rodriguez NP), then to go to clinic 3A. This RN called Alonzo on 3A. He will obtain a 16 french gastrostomy tube and get her scheduled and roomed.  Her GJ was removed and replaced with the 16 french gastrostomy tube.

## 2017-02-10 NOTE — TELEPHONE ENCOUNTER
Patient called this morning with complaints she thinks her GJ tube has flipped again, experiencing very similar symptoms to when this has happened before. (Increased pain, nausea etc). She said she's had a feeling it has been happening the past couple days. Her options were to either go to the ED or attempt to come to clinic. She thinks she can wait it out and come to clinic knowing that ED is always an option if the pain is too great or if she is not feeling well enough. She's hoping to either just get her GJ tube pulled today, it is due to be removed in 4 days, or at least an x-ray to confirm if it is in the correct position.     For right now she is turning off her tube feeds as Dr. Phoenix has instructed her to do that in the past and she is feeling increasingly nauseated. She will be checking her blood sugar every 30 minutes and treating it as necessary. She will also text coordinator at 7AM to inform her of what is going on.

## 2017-02-10 NOTE — PROGRESS NOTES
[facility] Progress Note    I had the pleasure of seeing Ms. Dinora Mcghee today. 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    Interval events since last visit with me:  GJ tube flipped again with sensation of reflux - came urgently to clinic this morning for symptoms.  ER visits = none, reasons: n/a  Inpatient admissions = 0 reasons    The patient reports their current symptoms to be:   GI function:   Nausea:   [x]  none    []  rare    []  often    []  daily  Comment: except the last couple   Vomiting: [x]  none    []  rare    []  often    []  daily   Comment:   Last BM: 2 days.  Stools are formed, frequency: daily until 2 days ago.   Appetite: good  Tube feeds at 60 cc/hr, 12 hr cycle at night only  Oral food intake: 3 meals per day  Pancreatic exocrine insufficiency:   Takes (Creon 24), 3-5 with meals, 1-2 with snacks.  Greasy stools: [x]  none   []  rarely    []  several times/wk   []  daily      Comment: none  Diabetes management:   Long acting insulin: Lantus--5 units/day, NPH 6  Short acting insulin: minimal units/day  Blood sugars typically range from 46 to 160.   A1C      4.5   12/19/2016  A1C      4.5   11/29/2016   Pain management:   Pain rating (0=no pain, 10=unbearable): 3-5 except a little more in last 2 days.  Long acting agent: none   Short acting agent: oxycodone 5-10 mg 4-5x/day. Night only is 10 mg dose.  Daily 'Uptime': most of the day.   Walking: distance 2227-8760 steps.  Prescription Medications as of 2/10/2017             glucagon (GLUCAGON EMERGENCY) 1 MG kit Inject 1 mg into the muscle once for 1 dose    insulin glargine (LANTUS) 100 UNIT/ML injection Starting 2/3/17, inject 7 units Lantus subcutaneously every 8 AM after turning off your tube feeds.    insulin isophane human (HUMULIN N PEN) 100 UNIT/ML injection Starting 2/2/17: Administer 7 units of NPH insulin subcutaneously each night at 8PM, the beginning of your 12 hour tube feeds. Make  sure your tube feeds are running properly before giving your 7 units of Humulin N.    LANsoprazole (PREVACID) 30 MG CR capsule Take 1 capsule (30 mg) by mouth daily Take 30-60 minutes before a meal.    aspirin 81 MG EC tablet Take 1 tablet (81 mg) by mouth daily    Injection Device for insulin (NOVOPEN ECHO) MARCO 1 each daily as needed    blood glucose monitoring (ACCU-CHEK EMMY) test strip Use to test blood sugar 6 times daily or as directed.    insulin pen needle (BD KEN U/F) 32G X 4 MM Use 6-8 times per day    insulin aspart (NOVOLOG PENFILL) 100 UNIT/ML injection Administer subcutaneously 0.5 unit per 15 grams of carbohydrates. Total 40-65 grams of carbohydrates per day.    amylase-lipase-protease (CREON 24) 10707 UNITS CPEP per EC capsule 3 capsules (72,000 Units) by Per J Tube route 3 times daily (with meals)    amylase-lipase-protease (CREON 24) 05194 UNITS CPEP per EC capsule Take 1 capsule (24,000 Units) by mouth as needed    insulin aspart (NOVOLOG PEN) 100 UNIT/ML injection Insulin to carbohydrate ratio: 1 unit per 15 grams CHO.    insulin aspart (NOVOLOG PEN) 100 UNIT/ML injection aspart medium correction 1 unit per 50 > 120 every 4 hours    For Pre-Meal  - 169 give 1 unit.   For Pre-Meal  - 219 give 2 units.   For Pre-Meal  - 269 give 3 units.   For Pre-Meal  - 319 give 4 units.   For Pre-Meal -369 give 5 units.  For Pre-Meal BG > 370 give 6 units.    amitriptyline (ELAVIL) 2 MG/ML solution 10 mLs (20 mg) by Per J Tube route At Bedtime    levothyroxine (SYNTHROID) 25 mcg/mL 5 mLs (125 mcg) by Per J Tube route every morning (before breakfast)    multivitamins CF formula (MVW COMPLETE FORMULATION) CAPS capsule Take 1 capsule by mouth daily    pantoprazole (PROTONIX) 2 mg/mL suspension 20 mLs (40 mg) by Per J Tube route 2 times daily    sodium bicarbonate 325 MG tablet 1 tablet (325 mg) by Per J Tube route every 4 hours    pregabalin (LYRICA) 20 MG/ML solution 2.5 mLs (50  mg) by Per J Tube route 2 times daily    sennosides (SENOKOT) 8.8 MG/5ML syrup 10 mLs by Per Feeding Tube route every morning    docusate (COLACE) 50 MG/5ML liquid 5 mLs (50 mg) by Per J Tube route daily    magnesium hydroxide (MILK OF MAGNESIA) 400 MG/5ML suspension 30 mLs by Per J Tube route daily    HYDROmorphone (DILAUDID) 1 MG/ML LIQD liquid 3 mLs (3 mg) by Per Feeding Tube route every 3 hours    LORazepam (ATIVAN) 2 MG/ML (HIGH CONC) solution Take 0.25 mLs (0.5 mg) by mouth every 6 hours as needed for anxiety, nausea or vomiting    glucose 40 % GEL gel Take 15-30 g by mouth every 15 minutes as needed for low blood sugar    promethazine (PHENERGAN) 6.25 MG/5ML syrup 10 mLs (12.5 mg) by Per Feeding Tube route every 4 hours as needed for nausea    prochlorperazine (COMPAZINE) 5 MG tablet 1 tablet (5 mg) by Per J Tube route every 6 hours    Sharps Container (BD SHARPS ) MISC 1 Container as needed    blood glucose monitoring (ACCU-CHEK MULTICLIX) lancets Use up to 6 times daily    blood glucose monitoring (ACCU-CHEK EMMY PLUS) meter device kit 1 kit by In Vitro route every 4 hours Use to test blood sugar 6 times daily or as directed.    Or whatever is covered    blood glucose monitoring (PAT CONTOUR NEXT) test strip Use to test blood sugar 6-8 times daily or as directed.    blood glucose monitoring (PAT MICROLET) lancets Use to test blood sugar 6-8 times daily or as directed.        Physical exam:   General Appearance: in no apparent distress.   Temp:  [98  F (36.7  C)] 98  F (36.7  C)  Pulse:  [92] 92  Resp:  [16] 16  BP: (105)/(65) 105/65 mmHg  SpO2:  [100 %] 100 %   Skin: Normal, no rashes or jaundice  Heart: Regular rhythm.  Lungs: no audible wheezes or increased work of breathing.  Abdomen: The abdomen is protuberant, and the wound is well healed, without hernia.  Tube exit site is clean. The abdomen is non-tender, epigastric.    Edema: none    Chronic Pancreatitis Latest Ref Rng 1/22/2017  1/23/2017 1/25/2017 1/31/2017 2/6/2017   Weight - 63.504 kg 68.13 kg - 69.491 kg 70.081 kg   AMYLASE 30 - 110 U/L 54 45 - - -   LIPASE 73 - 393 U/L 32(L) 35(L) - - -   HEMOGLOBIN A1C 4.3 - 6.0 % - - - - -   C PEPTIDE 0.9 - 6.9 ng/mL - - - - -   GLUCOSE 70 - 99 mg/dL 95 80 103(H) 116(H) -   HGB 11.7 - 15.7 g/dL 11.2(L) 9.4(L) 11.0(L) 10.7(L) -    - 450 10e9/L 834(H) 674(H) 689(H) 476(H) -   ALBUMIN 3.4 - 5.0 g/dL 3.3(L) - 3.3(L) 3.4 -   PREALBUMIN 15 - 45 mg/dL - - - 25 -       Assessment and Plan: She is doing fairly well s/p TP-IAT.  Interval progress has been good.  Postoperative gastric ileus: all meds now oral.  Changed GJ to G tube to maintain gastric access.  Pancreatic exocrine insufficiency: well controlled  Malnutrition:  Not present.  Will stop nocturnal feeds due to loss of GT and monitor progress.  Diabetes mellitus: stop NPH.  Will update Dr. Melissa.  Continue Lantus and SS plan.  Abdominal pain management: wean as tolerated this week.  Followup: I will see her again in clinic in 10 days .    Total time: 25 min, Counselling Time: 20 min.        Nestor Phoenix MD  Department of Surgery

## 2017-02-10 NOTE — MR AVS SNAPSHOT
After Visit Summary   2/10/2017    Dinora Mcghee    MRN: [id number]           Patient Information     Date Of Birth          1965        Visit Information        Provider Department      2/10/2017 7:00 AM Nestor Phoenix MD [facility]        Today's Diagnoses     Pancreatic insufficiency    -  1    Jejunostomy tube present (H)        Post-pancreatectomy diabetes (H)        Pain of upper abdomen           Follow-ups after your visit        Follow-up notes from your care team     Return in about 2 weeks (around 2/24/2017).      Your next 10 appointments already scheduled     Feb 21, 2017  1:15 PM CST   Lab with  LAB   [facility] ([facility])    [address] [place] [id number] [phone]            Feb 21, 2017  2:00 PM CST   (Arrive by 1:45 PM)   Return Auto Islet with Nestor Phoenix MD   [facility] ([facility])    [address] [place] [address] [phone]              Who to contact     If you have questions or need follow up information about today's clinic visit or your schedule please contact [facility] directly at [phone].  Normal or non-critical lab and imaging results will be communicated to you by MyChart, letter or phone within 4 business days after the clinic has received the results. If you do not hear from us within 7 days, please contact the clinic through [facility] or phone. If you have a critical or abnormal lab result, we will notify you by phone as soon as possible.  Submit refill requests through [employer] or call your pharmacy and they will forward the refill request to us. Please allow 3 business days for your refill to be completed.          Additional Information About Your Visit        [facility] Information     [employer] gives you secure access to your electronic health record. If you see a  primary care provider, you can also send messages to your care team and make appointments. If you have questions, please call your primary care clinic.  If you do not have a primary care provider, please call [phone] and they will assist you.        Care EveryWhere ID     This is your Care EveryWhere ID. This could be used by other organizations to access your [place] medical records  [id number]        Your Vitals Were     Pulse Temperature Respirations Pulse Oximetry          92 98  F (36.7  C) (Oral) 16 100%         Blood Pressure from Last 3 Encounters:   02/10/17 105/65   01/31/17 106/72   01/23/17 128/76    Weight from Last 3 Encounters:   02/06/17 70.1 kg (154 lb 8 oz)   01/31/17 69.5 kg (153 lb 3.2 oz)   01/23/17 68.1 kg (150 lb 3.2 oz)              Today, you had the following     No orders found for display         Today's Medication Changes          These changes are accurate as of: 2/10/17 11:59 PM.  If you have any questions, ask your nurse or doctor.               Start taking these medicines.        Dose/Directions    oxyCODONE 5 MG IR tablet   Commonly known as:  ROXICODONE   Used for:  Acute post-operative pain   Started by:  Mecca Watkins RN        Dose:  5 mg   Take 1 tablet (5 mg) by mouth every 4 hours as needed for moderate to severe pain   Quantity:  210 tablet   Refills:  0         These medicines have changed or have updated prescriptions.        Dose/Directions    amylase-lipase-protease 93190 UNITS Cpep per EC capsule   Commonly known as:  CREON 24   This may have changed:  Another medication with the same name was removed. Continue taking this medication, and follow the directions you see here.   Used for:  Acquired total absence of pancreas        Dose:  1 capsule   Take 1 capsule (24,000 Units) by mouth as needed   Quantity:  180 capsule   Refills:  3       * insulin aspart 100 UNIT/ML injection   Commonly known as:  NovoLOG PEN   This may have changed:  Another  medication with the same name was removed. Continue taking this medication, and follow the directions you see here.   Used for:  Acquired total absence of pancreas        aspart medium correction 1 unit per 50 > 120 every 4 hours ? For Pre-Meal  - 169 give 1 unit.  For Pre-Meal  - 219 give 2 units.  For Pre-Meal  - 269 give 3 units.  For Pre-Meal  - 319 give 4 units.  For Pre-Meal -369 give 5 units. For Pre-Meal BG > 370 give 6 units.   Refills:  3       * insulin aspart 100 UNIT/ML injection   Commonly known as:  NovoLOG PENFILL   This may have changed:  Another medication with the same name was removed. Continue taking this medication, and follow the directions you see here.   Used for:  Post-pancreatectomy diabetes (H)   Changed by:  Mecca Watkins RN        Administer subcutaneously 0.5 unit per 15 grams of carbohydrates. Total 40-65 grams of carbohydrates per day.   Quantity:  3 mL   Refills:  3       insulin glargine 100 UNIT/ML injection   Commonly known as:  LANTUS   This may have changed:  additional instructions   Used for:  Post-pancreatectomy diabetes (H)   Changed by:  Mecca Watkins RN        Inject 5 units subcutaneously every morning.   Quantity:  3 mL   Refills:  3       LORazepam 2 MG/ML (HIGH CONC) solution   Commonly known as:  ATIVAN   This may have changed:  how to take this   Used for:  Acquired total absence of pancreas        Dose:  0.5 mg   Take 0.25 mLs (0.5 mg) by mouth every 6 hours as needed for anxiety, nausea or vomiting   Quantity:  14 mL   Refills:  1       prochlorperazine 5 MG tablet   Commonly known as:  COMPAZINE   This may have changed:    - when to take this  - reasons to take this   Used for:  Acquired total absence of pancreas        Dose:  5 mg   1 tablet (5 mg) by Per J Tube route every 6 hours   Quantity:  56 tablet   Refills:  2       * Notice:  This list has 2 medication(s) that are the same as other medications prescribed for  you. Read the directions carefully, and ask your doctor or other care provider to review them with you.         Where to get your medicines      Some of these will need a paper prescription and others can be bought over the counter.  Ask your nurse if you have questions.     You don't need a prescription for these medications     insulin glargine 100 UNIT/ML injection    oxyCODONE 5 MG IR tablet                Primary Care Provider Office Phone # Fax #    Justyna Lawson [phone]       [facility] [address]  [place] [facility] [address]        Thank you!     Thank you for choosing [facility]  for your care. Our goal is always to provide you with excellent care. Hearing back from our patients is one way we can continue to improve our services. Please take a few minutes to complete the written survey that you may receive in the mail after your visit with us. Thank you!             Your Updated Medication List - Protect others around you: Learn how to safely use, store and throw away your medicines at www.disposemymeds.org.          This list is accurate as of: 2/10/17 11:59 PM.  Always use your most recent med list.                   Brand Name Dispense Instructions for use    amitriptyline 2 MG/ML solution    ELAVIL    80 mL    10 mLs (20 mg) by Per J Tube route At Bedtime       amylase-lipase-protease 78639 UNITS Cpep per EC capsule    CREON 24    180 capsule    Take 1 capsule (24,000 Units) by mouth as needed       aspirin 81 MG EC tablet     90 tablet    Take 1 tablet (81 mg) by mouth daily       BD SHARPS  Misc     1 each    1 Container as needed       * blood glucose monitoring lancets     1 Box    Use to test blood sugar 6-8 times daily or as directed.       * blood glucose monitoring lancets     1 Box    Use up to 6 times daily       blood glucose monitoring meter device kit     1 kit    1 kit by In Vitro route every 4 hours Use to test blood sugar 6  times daily or as directed.  Or whatever is covered       * blood glucose monitoring test strip    PAT CONTOUR NEXT    200 strip    Use to test blood sugar 6-8 times daily or as directed.       * blood glucose monitoring test strip    ACCU-CHEK EMMY    200 strip    Use to test blood sugar 6 times daily or as directed.       docusate 50 MG/5ML liquid    COLACE    280 mL    5 mLs (50 mg) by Per J Tube route daily       glucagon 1 MG kit    GLUCAGON EMERGENCY    1 mg    Inject 1 mg into the muscle once for 1 dose       glucose 40 % Gel gel     3 Tube    Take 15-30 g by mouth every 15 minutes as needed for low blood sugar       HYDROmorphone 1 MG/ML Liqd liquid    DILAUDID    336 mL    3 mLs (3 mg) by Per Feeding Tube route every 3 hours       Injection Device for insulin Tika    NOVOPEN ECHO    1 each    1 each daily as needed       * insulin aspart 100 UNIT/ML injection    NovoLOG PEN     aspart medium correction 1 unit per 50 > 120 every 4 hours ? For Pre-Meal  - 169 give 1 unit.  For Pre-Meal  - 219 give 2 units.  For Pre-Meal  - 269 give 3 units.  For Pre-Meal  - 319 give 4 units.  For Pre-Meal -369 give 5 units. For Pre-Meal BG > 370 give 6 units.       * insulin aspart 100 UNIT/ML injection    NovoLOG PENFILL    3 mL    Administer subcutaneously 0.5 unit per 15 grams of carbohydrates. Total 40-65 grams of carbohydrates per day.       insulin glargine 100 UNIT/ML injection    LANTUS    3 mL    Inject 5 units subcutaneously every morning.       insulin pen needle 32G X 4 MM    BD KEN U/F    100 each    Use 6-8 times per day       LANsoprazole 30 MG CR capsule    PREVACID    30 capsule    Take 1 capsule (30 mg) by mouth daily Take 30-60 minutes before a meal.       levothyroxine 25 mcg/mL    SYNTHROID    70 mL    5 mLs (125 mcg) by Per J Tube route every morning (before breakfast)       LORazepam 2 MG/ML (HIGH CONC) solution    ATIVAN    14 mL    Take 0.25 mLs (0.5 mg) by mouth  every 6 hours as needed for anxiety, nausea or vomiting       magnesium hydroxide 400 MG/5ML suspension    MILK OF MAGNESIA    840 mL    30 mLs by Per J Tube route daily       multivitamins CF formula Caps capsule     30 capsule    Take 1 capsule by mouth daily       oxyCODONE 5 MG IR tablet    ROXICODONE    210 tablet    Take 1 tablet (5 mg) by mouth every 4 hours as needed for moderate to severe pain       pantoprazole suspension    PROTONIX    280 mL    20 mLs (40 mg) by Per J Tube route 2 times daily       pregabalin 20 MG/ML solution    LYRICA    70 mL    2.5 mLs (50 mg) by Per J Tube route 2 times daily       prochlorperazine 5 MG tablet    COMPAZINE    56 tablet    1 tablet (5 mg) by Per J Tube route every 6 hours       promethazine 6.25 MG/5ML syrup    PHENERGAN    560 mL    10 mLs (12.5 mg) by Per Feeding Tube route every 4 hours as needed for nausea       sennosides 8.8 MG/5ML syrup    SENOKOT    280 mL    10 mLs by Per Feeding Tube route every morning       * Notice:  This list has 6 medication(s) that are the same as other medications prescribed for you. Read the directions carefully, and ask your doctor or other care provider to review them with you.

## 2017-02-10 NOTE — LETTER
2/10/2017      RE: Dinora Mcghee  [address]  [facility] [address]       Chronic Pancreatitis Group Progress Note    I had the pleasure of seeing Ms. Dinora Mcghee today. 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    Interval events since last visit with me:  GJ tube flipped again with sensation of reflux - came urgently to clinic this morning for symptoms.  ER visits = none, reasons: n/a  Inpatient admissions = 0 reasons    The patient reports their current symptoms to be:   GI function:   Nausea:   [x]  none    []  rare    []  often    []  daily  Comment: except the last couple   Vomiting: [x]  none    []  rare    []  often    []  daily   Comment:   Last BM: 2 days.  Stools are formed, frequency: daily until 2 days ago.   Appetite: good  Tube feeds at 60 cc/hr, 12 hr cycle at night only  Oral food intake: 3 meals per day  Pancreatic exocrine insufficiency:   Takes (Creon 24), 3-5 with meals, 1-2 with snacks.  Greasy stools: [x]  none   []  rarely    []  several times/wk   []  daily      Comment: none  Diabetes management:   Long acting insulin: Lantus--5 units/day, NPH 6  Short acting insulin: minimal units/day  Blood sugars typically range from 46 to 160.   A1C      4.5   12/19/2016  A1C      4.5   11/29/2016   Pain management:   Pain rating (0=no pain, 10=unbearable): 3-5 except a little more in last 2 days.  Long acting agent: none   Short acting agent: oxycodone 5-10 mg 4-5x/day. Night only is 10 mg dose.  Daily 'Uptime': most of the day.   Walking: distance [phone] steps.  Prescription Medications as of 2/10/2017             glucagon (GLUCAGON EMERGENCY) 1 MG kit Inject 1 mg into the muscle once for 1 dose    insulin glargine (LANTUS) 100 UNIT/ML injection Starting 2/3/17, inject 7 units Lantus subcutaneously every 8 AM after turning off your tube feeds.    insulin isophane human (HUMULIN N PEN) 100 UNIT/ML injection Starting 2/2/17: Administer 7 units of NPH  insulin subcutaneously each night at 8PM, the beginning of your 12 hour tube feeds. Make sure your tube feeds are running properly before giving your 7 units of Humulin N.    LANsoprazole (PREVACID) 30 MG CR capsule Take 1 capsule (30 mg) by mouth daily Take 30-60 minutes before a meal.    aspirin 81 MG EC tablet Take 1 tablet (81 mg) by mouth daily    Injection Device for insulin (NOVOPEN ECHO) MARCO 1 each daily as needed    blood glucose monitoring (ACCU-CHEK EMMY) test strip Use to test blood sugar 6 times daily or as directed.    insulin pen needle (BD KEN U/F) 32G X 4 MM Use 6-8 times per day    insulin aspart (NOVOLOG PENFILL) 100 UNIT/ML injection Administer subcutaneously 0.5 unit per 15 grams of carbohydrates. Total 40-65 grams of carbohydrates per day.    amylase-lipase-protease (CREON 24) 53175 UNITS CPEP per EC capsule 3 capsules (72,000 Units) by Per J Tube route 3 times daily (with meals)    amylase-lipase-protease (CREON 24) 83710 UNITS CPEP per EC capsule Take 1 capsule (24,000 Units) by mouth as needed    insulin aspart (NOVOLOG PEN) 100 UNIT/ML injection Insulin to carbohydrate ratio: 1 unit per 15 grams CHO.    insulin aspart (NOVOLOG PEN) 100 UNIT/ML injection aspart medium correction 1 unit per 50 > 120 every 4 hours    For Pre-Meal  - 169 give 1 unit.   For Pre-Meal  - 219 give 2 units.   For Pre-Meal  - 269 give 3 units.   For Pre-Meal  - 319 give 4 units.   For Pre-Meal -369 give 5 units.  For Pre-Meal BG > 370 give 6 units.    amitriptyline (ELAVIL) 2 MG/ML solution 10 mLs (20 mg) by Per J Tube route At Bedtime    levothyroxine (SYNTHROID) 25 mcg/mL 5 mLs (125 mcg) by Per J Tube route every morning (before breakfast)    multivitamins CF formula (MVW COMPLETE FORMULATION) CAPS capsule Take 1 capsule by mouth daily    pantoprazole (PROTONIX) 2 mg/mL suspension 20 mLs (40 mg) by Per J Tube route 2 times daily    sodium bicarbonate 325 MG tablet 1 tablet (325 mg)  by Per J Tube route every 4 hours    pregabalin (LYRICA) 20 MG/ML solution 2.5 mLs (50 mg) by Per J Tube route 2 times daily    sennosides (SENOKOT) 8.8 MG/5ML syrup 10 mLs by Per Feeding Tube route every morning    docusate (COLACE) 50 MG/5ML liquid 5 mLs (50 mg) by Per J Tube route daily    magnesium hydroxide (MILK OF MAGNESIA) 400 MG/5ML suspension 30 mLs by Per J Tube route daily    HYDROmorphone (DILAUDID) 1 MG/ML LIQD liquid 3 mLs (3 mg) by Per Feeding Tube route every 3 hours    LORazepam (ATIVAN) 2 MG/ML (HIGH CONC) solution Take 0.25 mLs (0.5 mg) by mouth every 6 hours as needed for anxiety, nausea or vomiting    glucose 40 % GEL gel Take 15-30 g by mouth every 15 minutes as needed for low blood sugar    promethazine (PHENERGAN) 6.25 MG/5ML syrup 10 mLs (12.5 mg) by Per Feeding Tube route every 4 hours as needed for nausea    prochlorperazine (COMPAZINE) 5 MG tablet 1 tablet (5 mg) by Per J Tube route every 6 hours    Sharps Container (BD SHARPS ) MISC 1 Container as needed    blood glucose monitoring (ACCU-CHEK MULTICLIX) lancets Use up to 6 times daily    blood glucose monitoring (ACCU-CHEK EMMY PLUS) meter device kit 1 kit by In Vitro route every 4 hours Use to test blood sugar 6 times daily or as directed.    Or whatever is covered    blood glucose monitoring (PAT CONTOUR NEXT) test strip Use to test blood sugar 6-8 times daily or as directed.    blood glucose monitoring (PAT MICROLET) lancets Use to test blood sugar 6-8 times daily or as directed.        Physical exam:   General Appearance: in no apparent distress.   Temp:  [98  F (36.7  C)] 98  F (36.7  C)  Pulse:  [92] 92  Resp:  [16] 16  BP: (105)/(65) 105/65 mmHg  SpO2:  [100 %] 100 %   Skin: Normal, no rashes or jaundice  Heart: Regular rhythm.  Lungs: no audible wheezes or increased work of breathing.  Abdomen: The abdomen is protuberant, and the wound is well healed, without hernia.  Tube exit site is clean. The abdomen is  non-tender, epigastric.    Edema: none    Chronic Pancreatitis Latest Ref Rng 1/22/2017 1/23/2017 1/25/2017 1/31/2017 2/6/2017   Weight - 63.504 kg 68.13 kg - 69.491 kg 70.081 kg   AMYLASE 30 - 110 U/L 54 45 - - -   LIPASE 73 - 393 U/L 32(L) 35(L) - - -   HEMOGLOBIN A1C 4.3 - 6.0 % - - - - -   C PEPTIDE 0.9 - 6.9 ng/mL - - - - -   GLUCOSE 70 - 99 mg/dL 95 80 103(H) 116(H) -   HGB 11.7 - 15.7 g/dL 11.2(L) 9.4(L) 11.0(L) 10.7(L) -    - 450 10e9/L 834(H) 674(H) 689(H) 476(H) -   ALBUMIN 3.4 - 5.0 g/dL 3.3(L) - 3.3(L) 3.4 -   PREALBUMIN 15 - 45 mg/dL - - - 25 -       Assessment and Plan: She is doing fairly well s/p TP-IAT.  Interval progress has been good.  Postoperative gastric ileus: all meds now oral.  Changed GJ to G tube to maintain gastric access.  Pancreatic exocrine insufficiency: well controlled  Malnutrition:  Not present.  Will stop nocturnal feeds due to loss of GT and monitor progress.  Diabetes mellitus: stop NPH.  Will update Dr. Melissa.  Continue Lantus and SS plan.  Abdominal pain management: wean as tolerated this week.  Followup: I will see her again in clinic in 10 days .    Total time: 25 min, Counselling Time: 20 min.        Nestor Phoenix MD  Department of Surgery

## 2017-02-10 NOTE — NURSING NOTE
"Chief Complaint   Patient presents with     TP-IAT Transplant     POD 44       Initial /65 mmHg  Pulse 92  Temp(Src) 98  F (36.7  C) (Oral)  Resp 16  SpO2 100% Estimated body mass index is 23.50 kg/(m^2) as calculated from the following:    Height as of 2/6/17: 1.727 m (5' 8\").    Weight as of 2/6/17: 70.081 kg (154 lb 8 oz).  Medication Reconciliation: complete    "

## 2017-02-10 NOTE — Clinical Note
2/10/2017       RE: Dinora Mcghee  [address] [facility] [address]     Dear Colleague,    Thank you for referring your patient, Dinora Mcghee, to the [facility] SOLID ORGAN TRANSPLANT at [facility]. Please see a copy of my visit note below.    No notes on file    Again, thank you for allowing me to participate in the care of your patient.      Sincerely,    Nestor Phoenix MD

## 2017-02-13 ENCOUNTER — TELEPHONE (OUTPATIENT)
Dept: TRANSPLANT | Facility: CLINIC | Age: 52
End: 2017-02-13

## 2017-02-15 ENCOUNTER — TELEPHONE (OUTPATIENT)
Dept: TRANSPLANT | Facility: CLINIC | Age: 52
End: 2017-02-15

## 2017-02-15 DIAGNOSIS — E89.1 POST-PANCREATECTOMY DIABETES (H): ICD-10-CM

## 2017-02-15 DIAGNOSIS — Z90.410 POST-PANCREATECTOMY DIABETES (H): ICD-10-CM

## 2017-02-15 DIAGNOSIS — E13.9 POST-PANCREATECTOMY DIABETES (H): ICD-10-CM

## 2017-02-17 ENCOUNTER — TELEPHONE (OUTPATIENT)
Dept: ENDOCRINOLOGY | Facility: CLINIC | Age: 52
End: 2017-02-17

## 2017-02-17 ENCOUNTER — TELEPHONE (OUTPATIENT)
Dept: TRANSPLANT | Facility: CLINIC | Age: 52
End: 2017-02-17

## 2017-02-17 NOTE — TELEPHONE ENCOUNTER
Spoke with Dinora. She is doing well. Describes regaining her stamina and walking at least 30 minutes daily (around 1.5 miles). She is very bothered by the gastrostomy tube and is c/o much blistering around the stoma from silver nitrate treatment. She has noted times where she has gone 7 hours without requiring any oxycodone. For today last dose was at 4 AM. She has taken some Tylenol. She is c/o many hot flashes. She will contact her PCP and she if there are any options for treatment that won't interfere with her current postoperative management.    She asked what she soul do if she develops a fever of flu symptoms. Her mother has contracted the flu. This writer explained that ALL fevers need to be evaluated now that she is asplenic. If she becomes ill this weekend requested she contact the on-call Coordinators. They will facilitate as needed. Dinora has this number. She can also contact me. She has all of my contact information.

## 2017-02-20 ENCOUNTER — TELEPHONE (OUTPATIENT)
Dept: TRANSPLANT | Facility: CLINIC | Age: 52
End: 2017-02-20

## 2017-02-20 DIAGNOSIS — Z90.410 POST-PANCREATECTOMY DIABETES (H): ICD-10-CM

## 2017-02-20 DIAGNOSIS — E89.1 POST-PANCREATECTOMY DIABETES (H): ICD-10-CM

## 2017-02-20 DIAGNOSIS — E13.9 POST-PANCREATECTOMY DIABETES (H): ICD-10-CM

## 2017-02-20 NOTE — TELEPHONE ENCOUNTER
Dinora sent an e-mail describing her waking up today with a head cold, post nasal drip, sore throat, and sinus pressure. She asked if she should be seen today or if tomorrow was soon enough. This writer told her she should be all right unless she spikes a temperature or if her symptoms escalate. Dinora verbalized understanding. She shared that she has discussed with her PCP regarding her hot flashes and hormones if there is anything that can help make her more comfortable. Dinora is also c/o increasing difficulty with her left shoulder which began right after her surgery. She stated that her ROM is significantly decreased due to pain.

## 2017-02-21 ENCOUNTER — OFFICE VISIT (OUTPATIENT)
Dept: TRANSPLANT | Facility: CLINIC | Age: 52
End: 2017-02-21
Attending: SURGERY
Payer: COMMERCIAL

## 2017-02-21 VITALS
HEIGHT: 68 IN | BODY MASS INDEX: 22.81 KG/M2 | DIASTOLIC BLOOD PRESSURE: 75 MMHG | WEIGHT: 150.5 LBS | HEART RATE: 91 BPM | SYSTOLIC BLOOD PRESSURE: 112 MMHG | TEMPERATURE: 97.7 F

## 2017-02-21 DIAGNOSIS — Z90.410 ACQUIRED TOTAL ABSENCE OF PANCREAS: ICD-10-CM

## 2017-02-21 DIAGNOSIS — E13.9 POST-PANCREATECTOMY DIABETES (H): ICD-10-CM

## 2017-02-21 DIAGNOSIS — E89.1 POST-PANCREATECTOMY DIABETES (H): ICD-10-CM

## 2017-02-21 DIAGNOSIS — L29.9 ITCHING: ICD-10-CM

## 2017-02-21 DIAGNOSIS — Z90.410 HISTORY OF PANCREATECTOMY: ICD-10-CM

## 2017-02-21 DIAGNOSIS — K86.89 PANCREATIC INSUFFICIENCY: Primary | ICD-10-CM

## 2017-02-21 DIAGNOSIS — K86.89 PANCREATIC INSUFFICIENCY: ICD-10-CM

## 2017-02-21 DIAGNOSIS — Z90.410 POST-PANCREATECTOMY DIABETES (H): ICD-10-CM

## 2017-02-21 DIAGNOSIS — G47.09 OTHER INSOMNIA: ICD-10-CM

## 2017-02-21 DIAGNOSIS — L29.9 ITCHING: Primary | ICD-10-CM

## 2017-02-21 DIAGNOSIS — G89.18 ACUTE POST-OPERATIVE PAIN: ICD-10-CM

## 2017-02-21 LAB
ALBUMIN SERPL-MCNC: 3.7 G/DL (ref 3.4–5)
ALP SERPL-CCNC: 612 U/L (ref 40–150)
ALT SERPL W P-5'-P-CCNC: 189 U/L (ref 0–50)
ANION GAP SERPL CALCULATED.3IONS-SCNC: 7 MMOL/L (ref 3–14)
AST SERPL W P-5'-P-CCNC: 116 U/L (ref 0–45)
BASOPHILS # BLD AUTO: 0.1 10E9/L (ref 0–0.2)
BASOPHILS NFR BLD AUTO: 1.9 %
BILIRUB SERPL-MCNC: 0.4 MG/DL (ref 0.2–1.3)
BUN SERPL-MCNC: 13 MG/DL (ref 7–30)
CALCIUM SERPL-MCNC: 9.1 MG/DL (ref 8.5–10.1)
CHLORIDE SERPL-SCNC: 104 MMOL/L (ref 94–109)
CO2 SERPL-SCNC: 29 MMOL/L (ref 20–32)
CREAT SERPL-MCNC: 0.72 MG/DL (ref 0.52–1.04)
DIFFERENTIAL METHOD BLD: ABNORMAL
EOSINOPHIL # BLD AUTO: 0.3 10E9/L (ref 0–0.7)
EOSINOPHIL NFR BLD AUTO: 7 %
ERYTHROCYTE [DISTWIDTH] IN BLOOD BY AUTOMATED COUNT: 13 % (ref 10–15)
GFR SERPL CREATININE-BSD FRML MDRD: 86 ML/MIN/1.7M2
GLUCOSE SERPL-MCNC: 122 MG/DL (ref 70–99)
HCT VFR BLD AUTO: 37.1 % (ref 35–47)
HGB BLD-MCNC: 11.6 G/DL (ref 11.7–15.7)
IMM GRANULOCYTES # BLD: 0 10E9/L (ref 0–0.4)
IMM GRANULOCYTES NFR BLD: 0.2 %
LYMPHOCYTES # BLD AUTO: 1.5 10E9/L (ref 0.8–5.3)
LYMPHOCYTES NFR BLD AUTO: 32.1 %
MCH RBC QN AUTO: 27.1 PG (ref 26.5–33)
MCHC RBC AUTO-ENTMCNC: 31.3 G/DL (ref 31.5–36.5)
MCV RBC AUTO: 87 FL (ref 78–100)
MONOCYTES # BLD AUTO: 0.7 10E9/L (ref 0–1.3)
MONOCYTES NFR BLD AUTO: 14.3 %
NEUTROPHILS # BLD AUTO: 2.1 10E9/L (ref 1.6–8.3)
NEUTROPHILS NFR BLD AUTO: 44.5 %
NRBC # BLD AUTO: 0 10*3/UL
NRBC BLD AUTO-RTO: 0 /100
PLATELET # BLD AUTO: 733 10E9/L (ref 150–450)
POTASSIUM SERPL-SCNC: 4.3 MMOL/L (ref 3.4–5.3)
PREALB SERPL IA-MCNC: 16 MG/DL (ref 15–45)
PROT SERPL-MCNC: 7.6 G/DL (ref 6.8–8.8)
RBC # BLD AUTO: 4.28 10E12/L (ref 3.8–5.2)
SODIUM SERPL-SCNC: 140 MMOL/L (ref 133–144)
WBC # BLD AUTO: 4.7 10E9/L (ref 4–11)

## 2017-02-21 PROCEDURE — 36415 COLL VENOUS BLD VENIPUNCTURE: CPT | Performed by: SURGERY

## 2017-02-21 PROCEDURE — 84134 ASSAY OF PREALBUMIN: CPT | Performed by: SURGERY

## 2017-02-21 PROCEDURE — 80053 COMPREHEN METABOLIC PANEL: CPT | Performed by: SURGERY

## 2017-02-21 PROCEDURE — 85025 COMPLETE CBC W/AUTO DIFF WBC: CPT | Performed by: SURGERY

## 2017-02-21 PROCEDURE — 99211 OFF/OP EST MAY X REQ PHY/QHP: CPT

## 2017-02-21 RX ORDER — POLYETHYLENE GLYCOL 3350 17 G/17G
17 POWDER, FOR SOLUTION ORAL DAILY PRN
Qty: 7 PACKET | Refills: 11
Start: 2017-02-21 | End: 2017-12-20

## 2017-02-21 RX ORDER — DOCUSATE SODIUM 100 MG/1
CAPSULE, LIQUID FILLED ORAL
Qty: 60 CAPSULE | Refills: 0
Start: 2017-02-21 | End: 2021-06-16 | Stop reason: ALTCHOICE

## 2017-02-21 RX ORDER — AMOXICILLIN 250 MG
1 CAPSULE ORAL 2 TIMES DAILY PRN
Qty: 100 TABLET
Start: 2017-02-21 | End: 2018-05-15

## 2017-02-21 RX ORDER — LEVOTHYROXINE SODIUM 125 UG/1
TABLET ORAL
Qty: 1 TABLET | Refills: 0
Start: 2017-02-21 | End: 2018-05-15

## 2017-02-21 RX ORDER — OXYCODONE HYDROCHLORIDE 5 MG/1
5-10 TABLET ORAL EVERY 4 HOURS PRN
Qty: 150 TABLET | Refills: 0 | Status: SHIPPED | OUTPATIENT
Start: 2017-02-21 | End: 2017-03-02

## 2017-02-21 RX ORDER — PREGABALIN 75 MG/1
75 CAPSULE ORAL 2 TIMES DAILY
Qty: 150 CAPSULE | Refills: 3 | Status: SHIPPED | OUTPATIENT
Start: 2017-02-21 | End: 2017-06-19

## 2017-02-21 RX ORDER — PREGABALIN 50 MG/1
50 CAPSULE ORAL 2 TIMES DAILY
Qty: 90 CAPSULE | Refills: 3 | Status: SHIPPED | OUTPATIENT
Start: 2017-02-21 | End: 2017-02-21

## 2017-02-21 RX ORDER — DOCUSATE SODIUM 100 MG/1
100 CAPSULE, LIQUID FILLED ORAL 2 TIMES DAILY
Qty: 60 CAPSULE | Refills: 0
Start: 2017-02-21 | End: 2017-02-21

## 2017-02-21 RX ORDER — PREGABALIN 50 MG/1
CAPSULE ORAL
Qty: 90 CAPSULE | Refills: 3
Start: 2017-02-21 | End: 2017-02-21

## 2017-02-21 RX ORDER — LEVOTHYROXINE SODIUM 125 UG/1
125 TABLET ORAL DAILY
Qty: 1 TABLET
Start: 2017-02-21 | End: 2017-02-21

## 2017-02-21 RX ORDER — PROCHLORPERAZINE MALEATE 5 MG
TABLET ORAL
Qty: 90 TABLET | Refills: 3
Start: 2017-02-21 | End: 2018-11-21

## 2017-02-21 RX ORDER — LORAZEPAM 2 MG/ML
0.5 CONCENTRATE ORAL EVERY 6 HOURS PRN
Qty: 14 ML | Refills: 1
Start: 2017-02-21 | End: 2017-02-21

## 2017-02-21 RX ORDER — CELECOXIB 100 MG/1
100 CAPSULE ORAL 2 TIMES DAILY
Qty: 60 CAPSULE | Refills: 1 | Status: SHIPPED | OUTPATIENT
Start: 2017-02-21 | End: 2017-05-03

## 2017-02-21 RX ORDER — LORAZEPAM 0.5 MG/1
0.25 TABLET ORAL EVERY 6 HOURS PRN
Qty: 20 TABLET | Refills: 0 | Status: SHIPPED | OUTPATIENT
Start: 2017-02-21 | End: 2017-03-24

## 2017-02-21 RX ORDER — DIPHENHYDRAMINE HCL 25 MG
25 CAPSULE ORAL EVERY 6 HOURS PRN
Qty: 56 CAPSULE | Refills: 0 | Status: ON HOLD
Start: 2017-02-21 | End: 2023-06-07

## 2017-02-21 RX ORDER — AMITRIPTYLINE HYDROCHLORIDE 10 MG/1
20 TABLET ORAL AT BEDTIME
Qty: 60 TABLET | Refills: 3
Start: 2017-02-21 | End: 2018-01-16

## 2017-02-21 RX ORDER — PROCHLORPERAZINE MALEATE 5 MG
TABLET ORAL
Qty: 56 TABLET | Refills: 2
Start: 2017-02-21 | End: 2017-04-17

## 2017-02-21 NOTE — NURSING NOTE
"Chief Complaint   Patient presents with     TP-IAT Transplant     POD 55       Initial /75 (BP Location: Right arm, Patient Position: Chair, Cuff Size: Adult Regular)  Pulse 91  Temp 97.7  F (36.5  C) (Oral)  Ht 1.727 m (5' 8\")  Wt 68.3 kg (150 lb 8 oz)  BMI 22.88 kg/m2 Estimated body mass index is 22.88 kg/(m^2) as calculated from the following:    Height as of this encounter: 1.727 m (5' 8\").    Weight as of this encounter: 68.3 kg (150 lb 8 oz).  Medication Reconciliation: complete    "

## 2017-02-21 NOTE — PROGRESS NOTES
Chronic Pancreatitis Group Progress Note    I had the pleasure of seeing Ms. Dinora Mcghee today. 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 12/28/2017   Interval events since last visit with me:   ER visits = 0, reasons: NA  Inpatient admissions = 0, reasons NA  The patient reports their current symptoms to be:   GI function:   Nausea:   []  none    [x]  rare    []  often    []  daily  Comment:Resolved.  Likely related to driving.  After removal of J tube.  Vomiting: [x]  none    []  rare    []  often    []  daily   Comment:   Last BM: Today.  Stools are soft, frequency : Brown. Yesterday and today.  Shooting for daily. Has constipation and diarrhea alerting.   Appetite: good    Oral food intake: 3 meals and 2 snacks  Pancreatic exocrine insufficiency:   Takes (Creon 24), 3-4 with large meals 1-2 with snacks.  Greasy stools: []  none   []  rarely    []  several times/wk   []  daily      Comment: Stools are sticky   Diabetes management:   Long acting insulin: Lantus--6 units/day in the am  Short acting insulin: Novolog--2-6 units/day  Blood sugars typically range from 100 to 110.   Lab Results   Component Value Date    A1C 4.5 12/19/2016    A1C 4.5 11/29/2016      Pain management:   Pain rating (0=no pain, 10=unbearable): At night it is bad. Tried ativan and oxycodone at night. Most of the pain is below the left rib cage.  She is unsure if it is the tube.   Long acting agent: oxycodonemg 4 times daily.  Short acting agent: oxycodone 5-10 mg 4 times daily.  Lyrica 50 mg BID  20mg of amitriptyline QHS   Daily 'Uptime': All day.  Up at 7:30am and up until 10  Walking: distance 2.5-3 miles, at least a 30 minute walk , 5 times per week  Prescription Medications as of 2/21/2017             LORazepam (ATIVAN) 2 MG/ML (HIGH CONC) solution Take 0.25 mLs (0.5 mg) by mouth every 6 hours as needed for anxiety, nausea or vomiting    prochlorperazine (COMPAZINE) 5 MG tablet Take 2 tabs by mouth  every 6 hours as needed for nausea.    insulin aspart (NOVOLOG PENFILL) 100 UNIT/ML injection Administer subcutaneously 0.5 unit per 15 grams of carbohydrates.    amylase-lipase-protease (CREON 24) 81216 UNITS CPEP per EC capsule Take 3-4 capsules by mouth with meals and 1-2 with snacks. Maximum 15 capsules per day.    insulin aspart (NOVOLOG PEN) 100 UNIT/ML injection aspart medium correction 1 unit per 50 > 130 every 4 hours    For Pre-Meal  - 179 give 1 unit.   For Pre-Meal  - 230 give 2 units.   For Pre-Meal  - 281 give 3 units.   For Pre-Meal  - 332 give 4 units.    amitriptyline (ELAVIL) 10 MG tablet Take 2 tablets (20 mg) by mouth At Bedtime    senna-docusate (SENOKOT-S;PERICOLACE) 8.6-50 MG per tablet Take 1 tablet by mouth 2 times daily as needed for constipation    prochlorperazine (COMPAZINE) 5 MG tablet Take two 5 mg tablets by mouth every six hours as needed for nausea.    polyethylene glycol (MIRALAX/GLYCOLAX) Packet Take 17 g by mouth daily as needed for constipation    diphenhydrAMINE (BENADRYL ALLERGY) 25 MG capsule Take 1 capsule (25 mg) by mouth every 6 hours as needed for itching or allergies    levothyroxine (SYNTHROID/LEVOTHROID) 125 MCG tablet Take 1 tablet (125 mcg), by mouth daily before breakfast.    docusate sodium (COLACE) 100 MG capsule Take 1 capsule (100 mg) by mouth 2 times daily as needed for constipation,    magnesium hydroxide (MILK OF MAGNESIA) 400 MG/5ML suspension Take 30-60 mLs by mouth daily as needed for constipation.    pregabalin (LYRICA) 50 MG capsule Take 1 capsule (50 mg), by mouth in the morning and at bedtime.    multivitamin CF formula (CHOICEFUL) capsule Take 1 tablet by mouth daily    Blood Glucose Monitoring Suppl (CONTOUR NEXT EZ MONITOR) W/DEVICE KIT 1 Device 6 times daily    insulin glargine (LANTUS) 100 UNIT/ML injection Inject 6 units subcutaneously every morning.    oxyCODONE (ROXICODONE) 5 MG IR tablet Take 1 tablet (5 mg) by mouth  every 4 hours as needed for moderate to severe pain    glucagon (GLUCAGON EMERGENCY) 1 MG kit Inject 1 mg into the muscle once for 1 dose    LANsoprazole (PREVACID) 30 MG CR capsule Take 1 capsule (30 mg) by mouth daily Take 30-60 minutes before a meal.    aspirin 81 MG EC tablet Take 1 tablet (81 mg) by mouth daily    Injection Device for insulin (NOVOPEN ECHO) MARCO 1 each daily as needed    insulin pen needle (BD KEN U/F) 32G X 4 MM Use 6-8 times per day    glucose 40 % GEL gel Take 15-30 g by mouth every 15 minutes as needed for low blood sugar    Sharps Container (BD SHARPS ) MISC 1 Container as needed    blood glucose monitoring (Sanguine CONTOUR NEXT) test strip Use to test blood sugar 6-8 times daily or as directed.    blood glucose monitoring (PAT MICROLET) lancets Use to test blood sugar 6-8 times daily or as directed.        Physical exam:   General Appearance: in no apparent distress.   Temp:  [97.7  F (36.5  C)] 97.7  F (36.5  C)  Pulse:  [91] 91  BP: (112)/(75) 112/75   Skin: Normal, no rashes or jaundice  Heart: Regular rhythm.  Lungs: no audible wheezes or increased work of breathing.  Abdomen: The abdomen is flat, and the wound is Healing well, without hernia.  Tube exit site is clean with small granulation tissue.  GT removed. The abdomen is mildly-tender, epigastric.    Edema: absent.    Chronic Pancreatitis Latest Ref Rng & Units 1/25/2017 1/31/2017 2/6/2017 2/10/2017 2/21/2017   Weight - - 69.491 kg 70.081 kg - 68.266 kg   AMYLASE 30 - 110 U/L - - - - -   LIPASE 73 - 393 U/L - - - - -   GLUCOSE 70 - 99 mg/dL 103(H) 116(H) - 87 122(H)   HGB 11.7 - 15.7 g/dL 11.0(L) 10.7(L) - 10.8(L) 11.6(L)    - 450 10e9/L 689(H) 476(H) - 627(H) 733(H)   ALBUMIN 3.4 - 5.0 g/dL 3.3(L) 3.4 - 3.2(L) 3.7   PREALBUMIN 15 - 45 mg/dL - 25 - 20 16     Assessment and Plan: She is doing well s/p TP-IAT.  Interval progress has been good.  Postoperative gastric ileus: resolved, d/c'd GT today.  Pancreatic  exocrine insufficiency:   Malnutrition: not an issue  Diabetes mellitus: doing well on minimal lantus, jaziel do MMT at 3 mo.  Abdominal pain management: will increase lyrica to 75 bid. Consider starting celebrex at 100 mg BID.   Will refer for physical therapy - to focus on abdominal wall relaxation/stretching now that GT is out, as well as upper extremity ROM.  Followup: I will see her again in clinic in 3-4 weeks    Total time: 25 min, Counselling Time: 20 min.          Nestor Phoenix MD  Department of Surgery

## 2017-02-21 NOTE — MR AVS SNAPSHOT
After Visit Summary   2/21/2017    Dinora Mcghee    MRN: [id number]           Patient Information     Date Of Birth          1965        Visit Information        Provider Department      2/21/2017 2:00 PM Nestor Phoenix MD [facility] Solid Organ Transplant        Today's Diagnoses     Pancreatic insufficiency    -  1    Post-pancreatectomy diabetes (H)        Acute post-operative pain        Itching        History of pancreatectomy        Acquired total absence of pancreas        Other insomnia           Follow-ups after your visit        Follow-up notes from your care team     Return in about 4 weeks (around 3/21/2017).      Your next 10 appointments already scheduled     Mar 21, 2017  7:30 AM CDT   Lab with  LAB   [facility] Lab ([facility])    [address] [place] [address]  [address] [facility] [address]   [phone]            Mar 21, 2017  8:30 AM CDT   (Arrive by 8:15 AM)   New General Liver with Ara Lugo MD   [facility] Hepatology ([facility])    [address] [place] [address]  [address] [facility] [address]   [phone]            Apr 04, 2017  2:20 PM CDT   (Arrive by 2:05 PM)   Return Auto Islet with Nestor Phoenix MD   [facility] Solid Organ Transplant ([facility])    [address] [place] [address]  [address] [facility] [address]   [phone]            Apr 10, 2017 11:00 AM CDT   (Arrive by 10:45 AM)   Return Visit with Sebastian López DO   [facility] ([facility])    [address] [place] [address]  4th Floor  [facility] [id number] [phone]              Future tests that were ordered for you today     Open Future Orders        Priority Expected Expires Ordered    Hepatic panel Routine 2/28/2017 5/29/2017 2/28/2017    Basic metabolic panel Routine 2/28/2017 5/29/2017 2/28/2017    CBC with platelets Routine 2/28/2017 5/29/2017  "2/28/2017    INR Routine 2/28/2017 5/29/2017 2/28/2017    Transplant Hepatology Referral Routine 2/28/2017 5/28/2017 2/28/2017            Who to contact     If you have questions or need follow up information about today's clinic visit or your schedule please contact [facility] SOLID ORGAN TRANSPLANT directly at [phone].  Normal or non-critical lab and imaging results will be communicated to you by [employer], letter or phone within 4 business days after the clinic has received the results. If you do not hear from us within 7 days, please contact the clinic through [employer] or phone. If you have a critical or abnormal lab result, we will notify you by phone as soon as possible.  Submit refill requests through [employer] or call your pharmacy and they will forward the refill request to us. Please allow 3 business days for your refill to be completed.          Additional Information About Your Visit        [employer] Information     [employer] gives you secure access to your electronic health record. If you see a primary care provider, you can also send messages to your care team and make appointments. If you have questions, please call your primary care clinic.  If you do not have a primary care provider, please call [phone] and they will assist you.        Care EveryWhere ID     This is your Care EveryWhere ID. This could be used by other organizations to access your [place] medical records  [id number]        Your Vitals Were     Pulse Temperature Height BMI (Body Mass Index)          91 97.7  F (36.5  C) (Oral) 1.727 m (5' 8\") 22.88 kg/m2         Blood Pressure from Last 3 Encounters:   02/21/17 112/75   02/10/17 105/65   01/31/17 106/72    Weight from Last 3 Encounters:   02/21/17 68.3 kg (150 lb 8 oz)   02/06/17 70.1 kg (154 lb 8 oz)   01/31/17 69.5 kg (153 lb 3.2 oz)              Today, you had the following     No orders found for display         Today's Medication Changes          These changes are accurate as " of: 2/21/17 11:59 PM.  If you have any questions, ask your nurse or doctor.               Start taking these medicines.        Dose/Directions    amitriptyline 10 MG tablet   Commonly known as:  ELAVIL   Used for:  Itching, Post-pancreatectomy diabetes (H), History of pancreatectomy, Pancreatic insufficiency   Started by:  Mecca Watkins RN        Dose:  20 mg   Take 2 tablets (20 mg) by mouth At Bedtime   Quantity:  60 tablet   Refills:  3       celecoxib 100 MG capsule   Commonly known as:  celeBREX   Used for:  Acute post-operative pain   Started by:  Nestor Phoenix MD        Dose:  100 mg   Take 1 capsule (100 mg) by mouth 2 times daily   Quantity:  60 capsule   Refills:  1       CONTOUR NEXT EZ MONITOR W/DEVICE Kit   Used for:  Itching, Post-pancreatectomy diabetes (H), History of pancreatectomy, Pancreatic insufficiency   Started by:  Mecca Watkins RN        Dose:  1 Device   1 Device 6 times daily   Refills:  0       diphenhydrAMINE 25 MG capsule   Commonly known as:  BENADRYL ALLERGY   Used for:  Itching, Post-pancreatectomy diabetes (H), History of pancreatectomy, Pancreatic insufficiency   Started by:  Mecca Watkins RN        Dose:  25 mg   Take 1 capsule (25 mg) by mouth every 6 hours as needed for itching or allergies   Quantity:  56 capsule   Refills:  0       docusate sodium 100 MG capsule   Commonly known as:  COLACE   Used for:  Itching, Post-pancreatectomy diabetes (H), History of pancreatectomy, Pancreatic insufficiency   Started by:  Mecca Watkins RN        Take 1 capsule (100 mg) by mouth 2 times daily as needed for constipation,   Quantity:  60 capsule   Refills:  0       levothyroxine 125 MCG tablet   Commonly known as:  SYNTHROID/LEVOTHROID   Used for:  Itching, Post-pancreatectomy diabetes (H), History of pancreatectomy, Pancreatic insufficiency   Started by:  Mecca Watkins RN        Take 1 tablet (125 mcg), by mouth daily before breakfast.   Quantity:   1 tablet   Refills:  0       LORazepam 0.5 MG tablet   Commonly known as:  ATIVAN   Used for:  History of pancreatectomy   Replaces:  LORazepam 2 MG/ML (HIGH CONC) solution   Started by:  Nestor Phoenix MD        Dose:  0.25 mg   Take 0.5 tablets (0.25 mg) by mouth every 6 hours as needed for anxiety   Quantity:  20 tablet   Refills:  0       magnesium hydroxide 400 MG/5ML suspension   Commonly known as:  MILK OF MAGNESIA   Used for:  Itching, Post-pancreatectomy diabetes (H), History of pancreatectomy, Pancreatic insufficiency   Started by:  Mecca Watkins RN        Take 30-60 mLs by mouth daily as needed for constipation.   Quantity:  105 mL   Refills:  0       multivitamin CF formula capsule   Commonly known as:  CHOICEFUL   Used for:  Itching, Post-pancreatectomy diabetes (H), History of pancreatectomy, Pancreatic insufficiency   Started by:  Mecca Watkins RN        Dose:  1 tablet   Take 1 tablet by mouth daily   Refills:  11       polyethylene glycol Packet   Commonly known as:  MIRALAX/GLYCOLAX   Used for:  Itching, Post-pancreatectomy diabetes (H), History of pancreatectomy, Pancreatic insufficiency   Started by:  Mecca Watkins RN        Dose:  17 g   Take 17 g by mouth daily as needed for constipation   Quantity:  7 packet   Refills:  11       pregabalin 75 MG capsule   Commonly known as:  LYRICA   Used for:  Itching, Post-pancreatectomy diabetes (H), History of pancreatectomy, Pancreatic insufficiency   Started by:  Nestor Phoenix MD        Dose:  75 mg   Take 1 capsule (75 mg) by mouth 2 times daily   Quantity:  150 capsule   Refills:  3       senna-docusate 8.6-50 MG per tablet   Commonly known as:  SENOKOT-S;PERICOLACE   Used for:  Itching, Post-pancreatectomy diabetes (H), History of pancreatectomy, Pancreatic insufficiency   Started by:  Mecca Watkins RN        Dose:  1 tablet   Take 1 tablet by mouth 2 times daily as needed for constipation   Quantity:  100 tablet    Refills:  0         These medicines have changed or have updated prescriptions.        Dose/Directions    amylase-lipase-protease 15606 UNITS Cpep per EC capsule   Commonly known as:  CREON 24   This may have changed:    - how much to take  - how to take this  - when to take this  - reasons to take this  - additional instructions   Used for:  Acquired total absence of pancreas   Changed by:  Mecca Watkins RN        Take 3-4 capsules by mouth with meals and 1-2 with snacks. Maximum 15 capsules per day.   Quantity:  180 capsule   Refills:  3       * insulin aspart 100 UNIT/ML injection   Commonly known as:  NovoLOG PENFILL   This may have changed:  additional instructions   Used for:  Post-pancreatectomy diabetes (H)   Changed by:  Mecca Watkins RN        Administer subcutaneously 0.5 unit per 15 grams of carbohydrates.   Quantity:  3 mL   Refills:  3       * insulin aspart 100 UNIT/ML injection   Commonly known as:  NovoLOG PEN   This may have changed:  additional instructions   Used for:  Acquired total absence of pancreas   Changed by:  Mecca Watkins RN        aspart medium correction 1 unit per 50 > 130 every 4 hours ? For Pre-Meal  - 179 give 1 unit.  For Pre-Meal  - 230 give 2 units.  For Pre-Meal  - 281 give 3 units.  For Pre-Meal  - 332 give 4 units.   Refills:  3       oxyCODONE 5 MG IR tablet   Commonly known as:  ROXICODONE   This may have changed:  how much to take   Used for:  Acute post-operative pain   Changed by:  Nestor Phoenix MD        Dose:  5-10 mg   Take 1-2 tablets (5-10 mg) by mouth every 4 hours as needed for moderate to severe pain   Quantity:  150 tablet   Refills:  0       * prochlorperazine 5 MG tablet   Commonly known as:  COMPAZINE   This may have changed:    - how much to take  - how to take this  - when to take this  - additional instructions   Used for:  Acquired total absence of pancreas   Changed by:  Mecca Watkins RN         Take 2 tabs by mouth every 6 hours as needed for nausea.   Quantity:  56 tablet   Refills:  2       * prochlorperazine 5 MG tablet   Commonly known as:  COMPAZINE   This may have changed:  You were already taking a medication with the same name, and this prescription was added. Make sure you understand how and when to take each.   Used for:  Itching, Post-pancreatectomy diabetes (H), History of pancreatectomy, Pancreatic insufficiency   Changed by:  Mecca Watkins RN        Take two 5 mg tablets by mouth every six hours as needed for nausea.   Quantity:  90 tablet   Refills:  3       * Notice:  This list has 4 medication(s) that are the same as other medications prescribed for you. Read the directions carefully, and ask your doctor or other care provider to review them with you.      Stop taking these medicines if you haven't already. Please contact your care team if you have questions.     LORazepam 2 MG/ML (HIGH CONC) solution   Commonly known as:  ATIVAN   Replaced by:  LORazepam 0.5 MG tablet   Stopped by:  Mecca Watkins RN                Where to get your medicines      Some of these will need a paper prescription and others can be bought over the counter.  Ask your nurse if you have questions.     Bring a paper prescription for each of these medications     celecoxib 100 MG capsule    LORazepam 0.5 MG tablet    oxyCODONE 5 MG IR tablet    pregabalin 75 MG capsule       You don't need a prescription for these medications     amitriptyline 10 MG tablet    amylase-lipase-protease 34054 UNITS Cpep per EC capsule    CONTOUR NEXT EZ MONITOR W/DEVICE Kit    diphenhydrAMINE 25 MG capsule    docusate sodium 100 MG capsule    insulin aspart 100 UNIT/ML injection    insulin aspart 100 UNIT/ML injection    levothyroxine 125 MCG tablet    magnesium hydroxide 400 MG/5ML suspension    multivitamin CF formula capsule    polyethylene glycol Packet    prochlorperazine 5 MG tablet    prochlorperazine 5 MG tablet     senna-docusate 8.6-50 MG per tablet                Primary Care Provider Office Phone # Fax #    Justyna Lawson [phone]       [facility] [address] [facility] [address]  [place] [address]        Thank you!     Thank you for choosing [facility] SOLID ORGAN TRANSPLANT  for your care. Our goal is always to provide you with excellent care. Hearing back from our patients is one way we can continue to improve our services. Please take a few minutes to complete the written survey that you may receive in the mail after your visit with us. Thank you!             Your Updated Medication List - Protect others around you: Learn how to safely use, store and throw away your medicines at www.disposemymeds.org.          This list is accurate as of: 2/21/17 11:59 PM.  Always use your most recent med list.                   Brand Name Dispense Instructions for use    amitriptyline 10 MG tablet    ELAVIL    60 tablet    Take 2 tablets (20 mg) by mouth At Bedtime       amylase-lipase-protease 92533 UNITS Cpep per EC capsule    CREON 24    180 capsule    Take 3-4 capsules by mouth with meals and 1-2 with snacks. Maximum 15 capsules per day.       aspirin 81 MG EC tablet     90 tablet    Take 1 tablet (81 mg) by mouth daily       BD SHARPS  Misc     1 each    1 Container as needed       blood glucose monitoring lancets     1 Box    Use to test blood sugar 6-8 times daily or as directed.       blood glucose monitoring test strip    PAT CONTOUR NEXT    200 strip    Use to test blood sugar 6-8 times daily or as directed.       celecoxib 100 MG capsule    celeBREX    60 capsule    Take 1 capsule (100 mg) by mouth 2 times daily       CONTOUR NEXT EZ MONITOR W/DEVICE Kit      1 Device 6 times daily       diphenhydrAMINE 25 MG capsule    BENADRYL ALLERGY    56 capsule    Take 1 capsule (25 mg) by mouth every 6 hours as needed for itching or allergies       docusate sodium 100 MG capsule    COLACE    60 capsule     Take 1 capsule (100 mg) by mouth 2 times daily as needed for constipation,       glucagon 1 MG kit    GLUCAGON EMERGENCY    1 mg    Inject 1 mg into the muscle once for 1 dose       glucose 40 % Gel gel     3 Tube    Take 15-30 g by mouth every 15 minutes as needed for low blood sugar       Injection Device for insulin Tika    NOVOPEN ECHO    1 each    1 each daily as needed       * insulin aspart 100 UNIT/ML injection    NovoLOG PENFILL    3 mL    Administer subcutaneously 0.5 unit per 15 grams of carbohydrates.       * insulin aspart 100 UNIT/ML injection    NovoLOG PEN     aspart medium correction 1 unit per 50 > 130 every 4 hours ? For Pre-Meal  - 179 give 1 unit.  For Pre-Meal  - 230 give 2 units.  For Pre-Meal  - 281 give 3 units.  For Pre-Meal  - 332 give 4 units.       insulin pen needle 32G X 4 MM    BD KEN U/F    100 each    Use 6-8 times per day       LANsoprazole 30 MG CR capsule    PREVACID    30 capsule    Take 1 capsule (30 mg) by mouth daily Take 30-60 minutes before a meal.       levothyroxine 125 MCG tablet    SYNTHROID/LEVOTHROID    1 tablet    Take 1 tablet (125 mcg), by mouth daily before breakfast.       LORazepam 0.5 MG tablet    ATIVAN    20 tablet    Take 0.5 tablets (0.25 mg) by mouth every 6 hours as needed for anxiety       magnesium hydroxide 400 MG/5ML suspension    MILK OF MAGNESIA    105 mL    Take 30-60 mLs by mouth daily as needed for constipation.       multivitamin CF formula capsule    CHOICEFUL     Take 1 tablet by mouth daily       oxyCODONE 5 MG IR tablet    ROXICODONE    150 tablet    Take 1-2 tablets (5-10 mg) by mouth every 4 hours as needed for moderate to severe pain       polyethylene glycol Packet    MIRALAX/GLYCOLAX    7 packet    Take 17 g by mouth daily as needed for constipation       pregabalin 75 MG capsule    LYRICA    150 capsule    Take 1 capsule (75 mg) by mouth 2 times daily       * prochlorperazine 5 MG tablet    COMPAZINE    56  tablet    Take 2 tabs by mouth every 6 hours as needed for nausea.       * prochlorperazine 5 MG tablet    COMPAZINE    90 tablet    Take two 5 mg tablets by mouth every six hours as needed for nausea.       senna-docusate 8.6-50 MG per tablet    SENOKOT-S;PERICOLACE    100 tablet    Take 1 tablet by mouth 2 times daily as needed for constipation       * Notice:  This list has 4 medication(s) that are the same as other medications prescribed for you. Read the directions carefully, and ask your doctor or other care provider to review them with you.

## 2017-02-21 NOTE — LETTER
2/21/2017       RE: Dinora Mcghee  [address]  [facility] [address]     Dear Colleague,    Thank you for referring your patient, Dinora Mcghee, to the [facility] SOLID ORGAN TRANSPL[facility] at [facility]. Please see a copy of my visit note below.    Chronic Pancreatitis Group Progress Note    I had the pleasure of seeing Ms. Dinora Mcghee today. 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 12/28/2017   Interval events since last visit with me:   ER visits = 0, reasons: NA  Inpatient admissions = 0, reasons NA  The patient reports their current symptoms to be:   GI function:   Nausea:   []  none    [x]  rare    []  often    []  daily  Comment:Resolved.  Likely related to driving.  After removal of J tube.  Vomiting: [x]  none    []  rare    []  often    []  daily   Comment:   Last BM: Today.  Stools are soft, frequency : Brown. Yesterday and today.  Shooting for daily. Has constipation and diarrhea alerting.   Appetite: good    Oral food intake: 3 meals and 2 snacks  Pancreatic exocrine insufficiency:   Takes (Creon 24), 3-4 with large meals 1-2 with snacks.  Greasy stools: []  none   []  rarely    []  several times/wk   []  daily      Comment: Stools are sticky   Diabetes management:   Long acting insulin: Lantus--6 units/day in the am  Short acting insulin: Novolog--2-6 units/day  Blood sugars typically range from 100 to 110.   Lab Results   Component Value Date    A1C 4.5 12/19/2016    A1C 4.5 11/29/2016      Pain management:   Pain rating (0=no pain, 10=unbearable): At night it is bad. Tried ativan and oxycodone at night. Most of the pain is below the left rib cage.  She is unsure if it is the tube.   Long acting agent: oxycodonemg 4 times daily.  Short acting agent: oxycodone 5-10 mg 4 times daily.  Lyrica 50 mg BID  20mg of amitriptyline QHS   Daily 'Uptime': All day.  Up at 7:30am and up until 10  Walking: distance 2.5-3 miles, at  least a 30 minute walk , 5 times per week  Prescription Medications as of 2/21/2017             LORazepam (ATIVAN) 2 MG/ML (HIGH CONC) solution Take 0.25 mLs (0.5 mg) by mouth every 6 hours as needed for anxiety, nausea or vomiting    prochlorperazine (COMPAZINE) 5 MG tablet Take 2 tabs by mouth every 6 hours as needed for nausea.    insulin aspart (NOVOLOG PENFILL) 100 UNIT/ML injection Administer subcutaneously 0.5 unit per 15 grams of carbohydrates.    amylase-lipase-protease (CREON 24) 82929 UNITS CPEP per EC capsule Take 3-4 capsules by mouth with meals and 1-2 with snacks. Maximum 15 capsules per day.    insulin aspart (NOVOLOG PEN) 100 UNIT/ML injection aspart medium correction 1 unit per 50 > 130 every 4 hours    For Pre-Meal  - 179 give 1 unit.   For Pre-Meal  - 230 give 2 units.   For Pre-Meal  - 281 give 3 units.   For Pre-Meal  - 332 give 4 units.    amitriptyline (ELAVIL) 10 MG tablet Take 2 tablets (20 mg) by mouth At Bedtime    senna-docusate (SENOKOT-S;PERICOLACE) 8.6-50 MG per tablet Take 1 tablet by mouth 2 times daily as needed for constipation    prochlorperazine (COMPAZINE) 5 MG tablet Take two 5 mg tablets by mouth every six hours as needed for nausea.    polyethylene glycol (MIRALAX/GLYCOLAX) Packet Take 17 g by mouth daily as needed for constipation    diphenhydrAMINE (BENADRYL ALLERGY) 25 MG capsule Take 1 capsule (25 mg) by mouth every 6 hours as needed for itching or allergies    levothyroxine (SYNTHROID/LEVOTHROID) 125 MCG tablet Take 1 tablet (125 mcg), by mouth daily before breakfast.    docusate sodium (COLACE) 100 MG capsule Take 1 capsule (100 mg) by mouth 2 times daily as needed for constipation,    magnesium hydroxide (MILK OF MAGNESIA) 400 MG/5ML suspension Take 30-60 mLs by mouth daily as needed for constipation.    pregabalin (LYRICA) 50 MG capsule Take 1 capsule (50 mg), by mouth in the morning and at bedtime.    multivitamin CF formula (CHOICEFUL)  capsule Take 1 tablet by mouth daily    Blood Glucose Monitoring Suppl (CONTOUR NEXT EZ MONITOR) W/DEVICE KIT 1 Device 6 times daily    insulin glargine (LANTUS) 100 UNIT/ML injection Inject 6 units subcutaneously every morning.    oxyCODONE (ROXICODONE) 5 MG IR tablet Take 1 tablet (5 mg) by mouth every 4 hours as needed for moderate to severe pain    glucagon (GLUCAGON EMERGENCY) 1 MG kit Inject 1 mg into the muscle once for 1 dose    LANsoprazole (PREVACID) 30 MG CR capsule Take 1 capsule (30 mg) by mouth daily Take 30-60 minutes before a meal.    aspirin 81 MG EC tablet Take 1 tablet (81 mg) by mouth daily    Injection Device for insulin (NOVOPEN ECHO) MARCO 1 each daily as needed    insulin pen needle (BD KEN U/F) 32G X 4 MM Use 6-8 times per day    glucose 40 % GEL gel Take 15-30 g by mouth every 15 minutes as needed for low blood sugar    Sharps Container ([employer] SHARPS ) MISC 1 Container as needed    blood glucose monitoring ([employer] CONTOUR NEXT) test strip Use to test blood sugar 6-8 times daily or as directed.    blood glucose monitoring ([employer] MICROLET) lancets Use to test blood sugar 6-8 times daily or as directed.        Physical exam:   General Appearance: in no apparent distress.   Temp:  [97.7  F (36.5  C)] 97.7  F (36.5  C)  Pulse:  [91] 91  BP: (112)/(75) 112/75   Skin: Normal, no rashes or jaundice  Heart: Regular rhythm.  Lungs: no audible wheezes or increased work of breathing.  Abdomen: The abdomen is flat, and the wound is Healing well, without hernia.  Tube exit site is clean with small granulation tissue.  GT removed. The abdomen is mildly-tender, epigastric.    Edema: absent.    Chronic Pancreatitis Latest Ref Rng & Units 1/25/2017 1/31/2017 2/6/2017 2/10/2017 2/21/2017   Weight - - 69.491 kg 70.081 kg - 68.266 kg   AMYLASE 30 - 110 U/L - - - - -   LIPASE 73 - 393 U/L - - - - -   GLUCOSE 70 - 99 mg/dL 103(H) 116(H) - 87 122(H)   HGB 11.7 - 15.7 g/dL 11.0(L) 10.7(L) - 10.8(L) 11.6(L)     - 450 10e9/L 689(H) 476(H) - 627(H) 733(H)   ALBUMIN 3.4 - 5.0 g/dL 3.3(L) 3.4 - 3.2(L) 3.7   PREALBUMIN 15 - 45 mg/dL - 25 - 20 16     Assessment and Plan: She is doing well s/p TP-IAT.  Interval progress has been good.  Postoperative gastric ileus: resolved, d/c'd GT today.  Pancreatic exocrine insufficiency:   Malnutrition: not an issue  Diabetes mellitus: doing well on minimal lantus, jaziel do MMT at 3 mo.  Abdominal pain management: will increase lyrica to 75 bid. Consider starting celebrex at 100 mg BID.   Will refer for physical therapy - to focus on abdominal wall relaxation/stretching now that GT is out, as well as upper extremity ROM.  Followup: I will see her again in clinic in 3-4 weeks    Total time: 25 min, Counselling Time: 20 min.          Nestor Phoenix MD  Department of Surgery

## 2017-02-27 ENCOUNTER — TELEPHONE (OUTPATIENT)
Dept: TRANSPLANT | Facility: CLINIC | Age: 52
End: 2017-02-27

## 2017-02-27 DIAGNOSIS — E89.1 POST-PANCREATECTOMY DIABETES (H): ICD-10-CM

## 2017-02-27 DIAGNOSIS — Z90.410 POST-PANCREATECTOMY DIABETES (H): ICD-10-CM

## 2017-02-27 DIAGNOSIS — E13.9 POST-PANCREATECTOMY DIABETES (H): ICD-10-CM

## 2017-02-27 NOTE — TELEPHONE ENCOUNTER
E-mail from Dinora sent 2/27/17.    I had a very rough Fri night and Saturday with severe upper abdominal pain and nausea. At 4am Fri I took some a dose of liquid dilaudid and got some relief did that (again without oxy) at bedtime Saturday as well.      Sunday morning things began moving again and I was able to function well all day. Today I feel pretty good so far too!

## 2017-02-28 ENCOUNTER — TELEPHONE (OUTPATIENT)
Dept: TRANSPLANT | Facility: CLINIC | Age: 52
End: 2017-02-28

## 2017-02-28 DIAGNOSIS — Z90.410 HISTORY OF PANCREATECTOMY: ICD-10-CM

## 2017-02-28 DIAGNOSIS — R74.8 ELEVATED LIVER ENZYMES: Primary | ICD-10-CM

## 2017-02-28 DIAGNOSIS — E89.1 POST-PANCREATECTOMY DIABETES (H): ICD-10-CM

## 2017-02-28 DIAGNOSIS — Z90.410 POST-PANCREATECTOMY DIABETES (H): ICD-10-CM

## 2017-02-28 DIAGNOSIS — K86.89 PANCREATIC INSUFFICIENCY: ICD-10-CM

## 2017-02-28 DIAGNOSIS — R94.5 ABNORMAL RESULTS OF LIVER FUNCTION STUDIES: Primary | ICD-10-CM

## 2017-02-28 DIAGNOSIS — E13.9 POST-PANCREATECTOMY DIABETES (H): ICD-10-CM

## 2017-03-02 DIAGNOSIS — G89.18 ACUTE POST-OPERATIVE PAIN: ICD-10-CM

## 2017-03-02 RX ORDER — OXYCODONE HYDROCHLORIDE 5 MG/1
5-10 TABLET ORAL EVERY 4 HOURS PRN
Qty: 150 TABLET | Refills: 0 | Status: SHIPPED | OUTPATIENT
Start: 2017-03-02 | End: 2018-01-16

## 2017-03-03 ENCOUNTER — TELEPHONE (OUTPATIENT)
Dept: TRANSPLANT | Facility: CLINIC | Age: 52
End: 2017-03-03

## 2017-03-03 DIAGNOSIS — M62.838 MUSCLE SPASM: Primary | ICD-10-CM

## 2017-03-03 DIAGNOSIS — M62.838 MUSCLE SPASM: ICD-10-CM

## 2017-03-03 RX ORDER — CYCLOBENZAPRINE HCL 5 MG
TABLET ORAL
Qty: 42 TABLET | Refills: 3 | Status: SHIPPED | OUTPATIENT
Start: 2017-03-03 | End: 2017-03-03

## 2017-03-03 RX ORDER — CYCLOBENZAPRINE HCL 5 MG
TABLET ORAL
Qty: 42 TABLET | Refills: 3 | Status: SHIPPED | OUTPATIENT
Start: 2017-03-03 | End: 2017-08-04

## 2017-03-03 NOTE — TELEPHONE ENCOUNTER
I had terrible pain and intense muscle spasms around the G-tube site. TJ the physical therapist said i has hyper engaged abs in that area. It hurt sooo bad I couldn't sleep and resorted to taking dilaudid. It started around 8:45pm and lasted until around 4am when i was finally able to fall asleep with the meds and heating pad. It is a very sharp relentless pain.  A couple questions for you:    1. Is this normal for the abs to be so engaged even after the tube is removed?    2. If it is, how long does it last?    3. I used all the body-mind tools I've learned to no avail and meds barely touched it. I do have some Flexeril here, is that something I can try if this happens again?    Sorry to barrage you with questions but this was 8/10 pain that nearly sent me to the ED.     Thanks for your help,  Dinora    Spoke with Dinora. She is using a heating pad and taking her pain medications and also meds for nausea with some relief. She has Flexeril 5 mg tabs from a previous provider. She will take a 5 mg tab and see if that helps. This RN will contact Dr. Phoenix to see if there is another medication that may help her. Dinora describes the pain as all within her abdominal muscles, nothing deep. She describes no additional symptoms. Will follow.

## 2017-03-06 ENCOUNTER — TELEPHONE (OUTPATIENT)
Dept: TRANSPLANT | Facility: CLINIC | Age: 52
End: 2017-03-06

## 2017-03-06 NOTE — TELEPHONE ENCOUNTER
Dinora reports that her abdominal wall pain is better with using the heating pad and the additional of Flexeril 5-10 mg as needed. She took the 10 mg dose over the weekend and now the 5 mg dose appears to be adequate. She reports feeling better.

## 2017-03-07 DIAGNOSIS — Z90.410 POST-PANCREATECTOMY DIABETES (H): ICD-10-CM

## 2017-03-07 DIAGNOSIS — E89.1 POST-PANCREATECTOMY DIABETES (H): ICD-10-CM

## 2017-03-07 DIAGNOSIS — E13.9 POST-PANCREATECTOMY DIABETES (H): ICD-10-CM

## 2017-03-14 ENCOUNTER — TELEPHONE (OUTPATIENT)
Dept: TRANSPLANT | Facility: CLINIC | Age: 52
End: 2017-03-14

## 2017-03-16 ENCOUNTER — TELEPHONE (OUTPATIENT)
Dept: TRANSPLANT | Facility: CLINIC | Age: 52
End: 2017-03-16

## 2017-03-16 NOTE — TELEPHONE ENCOUNTER
Mailed Dinora instructions about caring for her power port from the [facility] access device web site. Flushing and heparin instructions included. Explained this to Dinora and she verbalized understanding of normal saline 10 ml flush followed by 5 ml of Heparin 100 units/ml administration. Explained this needs to be completed q/4 weeks. She had her power port flushed and heparin locked at her [facility] in [place].

## 2017-03-20 ENCOUNTER — TELEPHONE (OUTPATIENT)
Dept: TRANSPLANT | Facility: CLINIC | Age: 52
End: 2017-03-20

## 2017-03-24 DIAGNOSIS — E89.1 POST-PANCREATECTOMY DIABETES (H): ICD-10-CM

## 2017-03-24 DIAGNOSIS — Z90.410 POST-PANCREATECTOMY DIABETES (H): Primary | ICD-10-CM

## 2017-03-24 DIAGNOSIS — Z90.410 HISTORY OF PANCREATECTOMY: ICD-10-CM

## 2017-03-24 DIAGNOSIS — E13.9 POST-PANCREATECTOMY DIABETES (H): Primary | ICD-10-CM

## 2017-03-24 DIAGNOSIS — Z90.410 POST-PANCREATECTOMY DIABETES (H): ICD-10-CM

## 2017-03-24 DIAGNOSIS — L29.9 ITCHING: ICD-10-CM

## 2017-03-24 DIAGNOSIS — E13.9 POST-PANCREATECTOMY DIABETES (H): ICD-10-CM

## 2017-03-24 DIAGNOSIS — F41.9 ANXIETY: Primary | ICD-10-CM

## 2017-03-24 DIAGNOSIS — K86.89 PANCREATIC INSUFFICIENCY: ICD-10-CM

## 2017-03-24 DIAGNOSIS — E89.1 POST-PANCREATECTOMY DIABETES (H): Primary | ICD-10-CM

## 2017-03-24 DIAGNOSIS — K59.00 CONSTIPATION: Primary | ICD-10-CM

## 2017-03-24 RX ORDER — LORAZEPAM 0.5 MG/1
0.25 TABLET ORAL EVERY 6 HOURS PRN
Qty: 20 TABLET | Refills: 0 | Status: SHIPPED | OUTPATIENT
Start: 2017-03-24 | End: 2017-04-10

## 2017-03-28 ENCOUNTER — DOCUMENTATION ONLY (OUTPATIENT)
Dept: OTHER | Facility: CLINIC | Age: 52
End: 2017-03-28

## 2017-03-28 ENCOUNTER — TELEPHONE (OUTPATIENT)
Dept: TRANSPLANT | Facility: CLINIC | Age: 52
End: 2017-03-28

## 2017-03-28 DIAGNOSIS — Z71.89 ACP (ADVANCE CARE PLANNING): Chronic | ICD-10-CM

## 2017-03-28 DIAGNOSIS — E89.1 POST-PANCREATECTOMY DIABETES (H): ICD-10-CM

## 2017-03-28 DIAGNOSIS — E13.9 POST-PANCREATECTOMY DIABETES (H): ICD-10-CM

## 2017-03-28 DIAGNOSIS — Z90.410 POST-PANCREATECTOMY DIABETES (H): ICD-10-CM

## 2017-03-29 ENCOUNTER — TRANSFERRED RECORDS (OUTPATIENT)
Dept: HEALTH INFORMATION MANAGEMENT | Facility: CLINIC | Age: 52
End: 2017-03-29

## 2017-03-29 DIAGNOSIS — Z53.9 ERRONEOUS ENCOUNTER--DISREGARD: Primary | ICD-10-CM

## 2017-03-29 ASSESSMENT — ANXIETY QUESTIONNAIRES
7. FEELING AFRAID AS IF SOMETHING AWFUL MIGHT HAPPEN: 0 = NOT AT ALL
GAD7 TOTAL SCORE: 2
GAD7 TOTAL SCORE: 2

## 2017-03-30 ENCOUNTER — TELEPHONE (OUTPATIENT)
Dept: TRANSPLANT | Facility: CLINIC | Age: 52
End: 2017-03-30

## 2017-03-30 ASSESSMENT — ENCOUNTER SYMPTOMS
FATIGUE: 0
STIFFNESS: 1
BACK PAIN: 1
RECTAL PAIN: 0
SINUS PAIN: 0
JAUNDICE: 0
DECREASED LIBIDO: 0
HOARSE VOICE: 0
ABDOMINAL PAIN: 1
MYALGIAS: 1
CONSTIPATION: 1
WEIGHT GAIN: 0
POLYPHAGIA: 0
DECREASED APPETITE: 0
EYE IRRITATION: 0
NIGHT SWEATS: 1
EYE PAIN: 0
JOINT SWELLING: 0
WEIGHT LOSS: 0
EYE REDNESS: 0
POLYDIPSIA: 0
HEARTBURN: 1
VOMITING: 1
INCREASED ENERGY: 0
RECTAL BLEEDING: 1
SMELL DISTURBANCE: 0
NECK PAIN: 1
HOT FLASHES: 1
ARTHRALGIAS: 1
SINUS CONGESTION: 1
MUSCLE WEAKNESS: 1
HALLUCINATIONS: 0
DOUBLE VISION: 0
CHILLS: 0
EYE WATERING: 0
DIARRHEA: 1
SORE THROAT: 0
TROUBLE SWALLOWING: 0
NAUSEA: 1
BLOATING: 1
TASTE DISTURBANCE: 0
FEVER: 0
NECK MASS: 0
BOWEL INCONTINENCE: 0
ALTERED TEMPERATURE REGULATION: 0
MUSCLE CRAMPS: 1
BLOOD IN STOOL: 0

## 2017-03-30 ASSESSMENT — ANXIETY QUESTIONNAIRES: GAD7 TOTAL SCORE: 2

## 2017-03-30 NOTE — TELEPHONE ENCOUNTER
Spoke with Dinora who is in the process of doing the bowel clean out. She reports have 3 bowel movements yesterday, 3/29 and 1 large stool this morning. She took the 2 Dulcolax tabs before bed last night. Dinora stated she didn't feel like eating. She will check her blood glucose every 2 hours until she was finished drinking the clean out solution of Gatorade zero and Miralax 9.6 ounce bottle. She will keep me posted on how she is doing.

## 2017-03-31 ENCOUNTER — TELEPHONE (OUTPATIENT)
Dept: TRANSPLANT | Facility: CLINIC | Age: 52
End: 2017-03-31

## 2017-03-31 NOTE — TELEPHONE ENCOUNTER
"Spoke to Dinora, no pain pills yesterday until 2 am. Probably just due to the \"active belly \"resulting from the bowel clean out.  She has ongoing shoulder pain and RUQ pain around the G tube site. She has F/U with Dr Phoenix Turoseyday and does not feel she needs to be seen before then.  "

## 2017-04-03 ENCOUNTER — TELEPHONE (OUTPATIENT)
Dept: TRANSPLANT | Facility: CLINIC | Age: 52
End: 2017-04-03

## 2017-04-03 DIAGNOSIS — K86.89 PANCREATIC INSUFFICIENCY: Primary | ICD-10-CM

## 2017-04-03 NOTE — TELEPHONE ENCOUNTER
Blood Sugar  Concentration Event Notes Date/Time   165 mg/dL AfterDinner Apr 2, 2017 9:26:52 PM   119 mg/dL BeforeDinner  Apr 2, 2017 5:45:00 PM   103 mg/dL BeforeLunch  Apr 2, 2017 12:31:44 PM   98 mg/dL AfterSleep  Apr 2, 2017 8:24:00 AM   117 mg/dL BeforeSleep  Apr 1, 2017 11:07:16 PM   92 mg/dL BeforeSleep  Apr 1, 2017 9:53:00 PM   102 mg/dL BeforeDinner Apr 1, 2017 7:10:00 PM   92 mg/dL Other Pre snack Apr 1, 2017 4:03:45 PM   103 mg/dL BeforeLunch  Apr 1, 2017 12:40:00 PM   95 mg/dL AfterSleep  Apr 1, 2017 10:10:00 AM   109 mg/dL BeforeSleep  Mar 31, 2017 10:30:00 PM   106 mg/dL BeforeDinner  Mar 31, 2017 6:45:00 PM   114 mg/dL Other Before snack Mar 31, 2017 4:31:53 PM   150 mg/dL BeforeLunch Correction  Mar 31, 2017 12:05:00 PM   110 mg/dL AfterSleep  Mar 31, 2017 8:55:00 AM   155 mg/dL BeforeSleep Corrected  Mar 30, 2017 10:09:44 PM   113 mg/dL BeforeDinner  Mar 30, 2017 6:20:00 PM   134 mg/dL Other Correction. Before snack Mar 30, 2017 2:58:22 PM   95 mg/dL BeforeLunch  Mar 30, 2017 11:54:20 AM   99 mg/dL AfterSleep  Mar 30, 2017 8:10:00 AM   139 mg/dL BeforeSleep Corrected Mar 29, 2017 9:35:00 PM   105 mg/dL BeforeDinner  Mar 29, 2017 6:30:55 PM   90 mg/dL BeforeLunch Post 2.5 mile in 45 min walk Mar 29, 2017 12:11:55 PM   90 mg/dL AfterSleep  Mar 29, 2017 8:24:16 AM   92 mg/dL BeforeSleep  Mar 28, 2017 10:50:45 PM   122 mg/dL BeforeDinner  Mar 28, 2017 6:31:42 PM   77 mg/dL Other Before snack prior to exercise  Mar 28, 2017 3:58:57 PM   82 mg/dL BeforeLunch  Mar 28, 2017 12:24:46 PM   117 mg/dL AfterSleep  Mar 28, 2017 7:40:14 AM   120 mg/dL Other Middle of night- up with pain & nausea Mar 28, 2017 2:55:00 AM   Food  Food Grams Carbs (g) Date/Time Fiber (g) Sugar (g) Notes   hamburger, lettuce, tomato, mushrooms, cheese, bun, chips, unsweetened iced tea, 3 prunes & fat free low carb brownie 1200 95 Apr 2, 2017 6:15:00 PM 0 0 [place], dessert at home   chips-multi grain scoops & tomatillo salsa, 2 slices  turkey 180 23 Apr 2, 2017 12:31:54 PM 2 0    Greek yogurt 1/3 c, fresh fruit 20g, granola 1/8  240 26 Apr 2, 2017 9:48:46 AM 5 0    special k bar & skim milk 320 0 Apr 1, 2017 11:05:37 PM 0 0    walleye, red potato, cabbage salad, cheese & meat appetizer, fat free brownie 1200 0 Apr 1, 2017 7:15:00 PM 0 0    chips-multi grain scoops & tomatillo salsa 160 21 Apr 1, 2017 4:04:05 PM 2 0    [place], 4 Ritz crackers, pistachio nuts 250 0 Apr 1, 2017 12:54:41 PM 0 0    Greek yogurt 1/3 c, protein pwdr, fresh fruit 18g, granola 1T  250 27 Apr 1, 2017 10:10:09 AM 6 0    chips-multi grain scoops & tomatillo salsa 160 21 Mar 31, 2017 4:29:39 PM 2 0    oat Blenders 3/4 cup, Fairlife milk .5 cup 210 28 Mar 31, 2017 12:47:48 PM 0 0    Greek yogurt 1/3 c, protein pwdr, fresh fruit 20g, granola 1/8  350 38 Mar 31, 2017 9:15:57 AM 8 0    sherbet & cake 350 0 Mar 30, 2017 8:00:00 PM 0 0 Covered   egg, cheese, ham 350 0 Mar 30, 2017 6:05:00 PM 0 0    peanut butter fudgre1/2 piece 75 0 Mar 30, 2017 3:00:44 PM 0 0    Greek yogurt, protein, fresh veggies, granola  350 32 Mar 30, 2017 2:59:32 PM 7 0    grilled ham & cheese 350 0 Mar 30, 2017 11:54:31 AM 0 0    humans & kwesi chips 100 28 Mar 29, 2017 10:30:00 PM 0 0    Greek yogurt, fresh berries 110 16 Mar 29, 2017 10:30:00 PM 4 0    bean & tomato broth soup 1 cup 350 0 Mar 29, 2017 6:41:28 PM 0 0    poweraid zero 1 0 Mar 29, 2017 12:20:24 PM 0 0    oatmeal 75 12 Mar 29, 2017 12:18:53 PM 2 0    breakfast essentials with added whey pwdr & Fairlife milk 320 25 Mar 29, 2017 10:03:05 AM 0 25    milk 1 cup 120 0 Mar 28, 2017 10:30:00 PM 0 0    sugar free popsicle 2 20 8 Mar 28, 2017 9:15:00 PM 0 0    green split pea Progresso soup 210 38 Mar 28, 2017 6:31:54 PM 6 3    Greek yogurt, protein, fresh berries, 1T Iss[place] seeds, flax seed ground 2/3 of cup 175 12 Mar 28, 2017 3:59:47 PM 3 0    green split pea Progresso soup, fresh fruit, cheddar cheese 260 47 Mar 28, 2017 12:25:55 PM 7 4    [place]  yogurt, protein, fresh berries, 1T Issa seeds, flax seed ground  320 22 Mar 28, 2017 8:00:00 AM 5 0    Medication  Name Dosage Unit Notes Date/Time   Novalog 1.0 [id number] Correction - after dinner Apr 2, 2017 9:27:19 PM   Novalog 1.0 [id number] Coverage for dinner Apr 2, 2017 6:00:00 PM   Lantus 4.0 [id number]  Apr 2, 2017 8:50:00 AM   Novalog 1.0 [id number] Coverage Apr 1, 2017 10:35:00 PM   Novalog 1.0 [id number] Cover dinner Apr 1, 2017 7:00:00 PM   Lantus 4.0 [id number]  Apr 1, 2017 9:47:19 AM   Novalog 1.0 [id number] Correction  Mar 31, 2017 12:10:00 PM   Lantus 4.0 [id number]  Mar 31, 2017 8:55:00 AM   Novalog 1.0 [id number]  Mar 30, 2017 10:10:03 PM   Novalog 1.0 [id number]  Mar 30, 2017 8:05:00 PM   Novalog 1.0 [id number] Correction 1.0, Coverage. 5 Mar 30, 2017 2:59:00 PM   Lantus 4.0 [id number]  Mar 30, 2017 8:15:00 AM   Novalog 1.0 [id number] 1.0 correction, .5 coverage for snack Mar 29, 2017 10:00:00 PM   Lantus 4.0 [id number]  Mar 29, 2017 8:15:00 AM   Lantus 4.0 [id number]  Mar 28, 2017 7:30:00 AM   Weight  Weight Notes Date/Time   146 lb  Apr 1, 2017 10:11:51 AM   145.7 lb  Mar 31, 2017 9:15:41 AM   148 lb  Mar 29, 2017 8:00:00 AM   148 lb  Mar 28, 2017 7:30:00 AM     Blood Sugar  Concentration Event Notes Date/Time   165 mg/dL AfterDinner  Apr 2, 2017 9:26:52 PM   119 mg/dL BeforeDinner  Apr 2, 2017 5:45:00 PM   103 mg/dL BeforeLunch  Apr 2, 2017 12:31:44 PM   98 mg/dL AfterSleep  Apr 2, 2017 8:24:00 AM   117 mg/dL BeforeSleep  Apr 1, 2017 11:07:16 PM   92 mg/dL BeforeSleep  Apr 1, 2017 9:53:00 PM   102 mg/dL BeforeDinner  Apr 1, 2017 7:10:00 PM   92 mg/dL Other Pre snack Apr 1, 2017 4:03:45 PM   103 mg/dL BeforeLunch  Apr 1, 2017 12:40:00 PM   95 mg/dL AfterSleep  Apr 1, 2017 10:10:00 AM   109 mg/dL BeforeSleep  Mar 31, 2017 10:30:00 PM   106 mg/dL BeforeDinner  Mar 31, 2017 6:45:00 PM   114 mg/dL Other Before snack Mar 31, 2017 4:31:53 PM   150 mg/dL BeforeLunch Correction  Mar 31, 2017 12:05:00  PM   110 mg/dL AfterSleep  Mar 31, 2017 8:55:00 AM   155 mg/dL BeforeSleep Corrected  Mar 30, 2017 10:09:44 PM   113 mg/dL BeforeDinner  Mar 30, 2017 6:20:00 PM   134 mg/dL Other Correction. Before snack Mar 30, 2017 2:58:22 PM   95 mg/dL BeforeLunch  Mar 30, 2017 11:54:20 AM   99 mg/dL AfterSleep  Mar 30, 2017 8:10:00 AM   139 mg/dL BeforeSleep Corrected Mar 29, 2017 9:35:00 PM   105 mg/dL BeforeDinner  Mar 29, 2017 6:30:55 PM   90 mg/dL BeforeLunch Post 2.5 mile in 45 min walk Mar 29, 2017 12:11:55 PM   90 mg/dL AfterSleep  Mar 29, 2017 8:24:16 AM   92 mg/dL BeforeSleep  Mar 28, 2017 10:50:45 PM   122 mg/dL BeforeDinner  Mar 28, 2017 6:31:42 PM   77 mg/dL Other Before snack prior to exercise  Mar 28, 2017 3:58:57 PM   82 mg/dL BeforeLunch  Mar 28, 2017 12:24:46 PM   117 mg/dL AfterSleep  Mar 28, 2017 7:40:14 AM   120 mg/dL Other Middle of night- up with pain & nausea Mar 28, 2017 2:55:00 AM   Food  Food Grams Carbs (g) Date/Time Fiber (g) Sugar (g) Notes   hamburger, lettuce, tomato, mushrooms, cheese, bun, chips, unsweetened iced tea, 3 prunes & fat free low carb brownie 1200 95 Apr 2, 2017 6:15:00 PM 0 0 [place], dessert at home   chips-multi grain scoops & tomatillo salsa, 2 slices turkey 180 23 Apr 2, 2017 12:31:54 PM 2 0    Greek yogurt 1/3 c, fresh fruit 20g, granola 1/8  240 26 Apr 2, 2017 9:48:46 AM 5 0    special k bar & skim milk 320 0 Apr 1, 2017 11:05:37 PM 0 0    walleye, red potato, cabbage salad, cheese & meat appetizer, fat free brownie 1200 0 Apr 1, 2017 7:15:00 PM 0 0    chips-multi grain scoops & tomatillo salsa 160 21 Apr 1, 2017 4:04:05 PM 2 0    [place], 4 Ritz crackers, pistachio nuts 250 0 Apr 1, 2017 12:54:41 PM 0 0    Greek yogurt 1/3 c, protein pwdr, fresh fruit 18g, granola 1T  250 27 Apr 1, 2017 10:10:09 AM 6 0    chips-multi grain scoops & tomatillo salsa 160 21 Mar 31, 2017 4:29:39 PM 2 0    oat Blenders 3/4 cup, Fairlife milk .5 cup 210 28 Mar 31, 2017 12:47:48 PM 0 0    Greek yogurt  1/3 c, protein pwdr, fresh fruit 20g, granola 1/8  350 38 Mar 31, 2017 9:15:57 AM 8 0    sherbet & cake 350 0 Mar 30, 2017 8:00:00 PM 0 0 Covered   egg, cheese, ham 350 0 Mar 30, 2017 6:05:00 PM 0 0    peanut butter fudgre1/2 piece 75 0 Mar 30, 2017 3:00:44 PM 0 0    Greek yogurt, protein, fresh veggies, granola  350 32 Mar 30, 2017 2:59:32 PM 7 0    grilled ham & cheese 350 0 Mar 30, 2017 11:54:31 AM 0 0    humans & kwesi chips 100 28 Mar 29, 2017 10:30:00 PM 0 0    Greek yogurt, fresh berries 110 16 Mar 29, 2017 10:30:00 PM 4 0    bean & tomato broth soup 1 cup 350 0 Mar 29, 2017 6:41:28 PM 0 0    poweraid zero 1 0 Mar 29, 2017 12:20:24 PM 0 0    oatmeal 75 12 Mar 29, 2017 12:18:53 PM 2 0    breakfast essentials with added whey pwdr & Fairlife milk 320 25 Mar 29, 2017 10:03:05 AM 0 25    milk 1 cup 120 0 Mar 28, 2017 10:30:00 PM 0 0    sugar free popsicle 2 20 8 Mar 28, 2017 9:15:00 PM 0 0    green split pea Progresso soup 210 38 Mar 28, 2017 6:31:54 PM 6 3    Greek yogurt, protein, fresh berries, 1T Issa seeds, flax seed ground 2/3 of cup 175 12 Mar 28, 2017 3:59:47 PM 3 0    green split pea Progresso soup, fresh fruit, cheddar cheese 260 47 Mar 28, 2017 12:25:55 PM 7 4    Greek yogurt, protein, fresh berries, 1T Issa seeds, flax seed ground  320 22 Mar 28, 2017 8:00:00 AM 5 0    Medication  Name Dosage Unit Notes Date/Time   Novalog 1.0 [id number] Correction - after dinner Apr 2, 2017 9:27:19 PM   Novalog 1.0 [id number] Coverage for dinner Apr 2, 2017 6:00:00 PM   Lantus 4.0 [id number]  Apr 2, 2017 8:50:00 AM   Novalog 1.0 [id number] Coverage Apr 1, 2017 10:35:00 PM   Novalog 1.0 [id number] Cover dinner Apr 1, 2017 7:00:00 PM   Lantus 4.0 [id number]  Apr 1, 2017 9:47:19 AM   Novalog 1.0 [id number] Correction  Mar 31, 2017 12:10:00 PM   Lantus 4.0 [id number]  Mar 31, 2017 8:55:00 AM   Novalog 1.0 [id number]  Mar 30, 2017 10:10:03 PM   Novalog 1.0 [id number]  Mar 30, 2017 8:05:00 PM   Novalog 1.0 [id number]  Correction 1.0, Coverage. 5 Mar 30, 2017 2:59:00 PM   Lantus 4.0 [id number]  Mar 30, 2017 8:15:00 AM   Novalog 1.0 [id number] 1.0 correction, .5 coverage for snack Mar 29, 2017 10:00:00 PM   Lantus 4.0 [id number]  Mar 29, 2017 8:15:00 AM   Lantus 4.0 [id number]  Mar 28, 2017 7:30:00 AM   Weight  Weight Notes Date/Time   146 lb  Apr 1, 2017 10:11:51 AM   145.7 lb  Mar 31, 2017 9:15:41 AM   148 lb  Mar 29, 2017 8:00:00 AM   148 lb  Mar 28, 2017 7:30:00 AM

## 2017-04-04 ENCOUNTER — RESULTS ONLY (OUTPATIENT)
Dept: LAB | Age: 52
End: 2017-04-04

## 2017-04-04 ENCOUNTER — OFFICE VISIT (OUTPATIENT)
Dept: TRANSPLANT | Facility: CLINIC | Age: 52
End: 2017-04-04
Attending: SURGERY
Payer: COMMERCIAL

## 2017-04-04 VITALS
BODY MASS INDEX: 23.13 KG/M2 | HEART RATE: 63 BPM | DIASTOLIC BLOOD PRESSURE: 70 MMHG | TEMPERATURE: 98.2 F | OXYGEN SATURATION: 96 % | SYSTOLIC BLOOD PRESSURE: 106 MMHG | HEIGHT: 68 IN | RESPIRATION RATE: 16 BRPM | WEIGHT: 152.6 LBS

## 2017-04-04 DIAGNOSIS — R10.9 ABDOMINAL WALL PAIN: Primary | ICD-10-CM

## 2017-04-04 DIAGNOSIS — E13.9 POST-PANCREATECTOMY DIABETES (H): ICD-10-CM

## 2017-04-04 DIAGNOSIS — E89.1 POST-PANCREATECTOMY DIABETES (H): ICD-10-CM

## 2017-04-04 DIAGNOSIS — Z90.410 HISTORY OF PANCREATECTOMY: ICD-10-CM

## 2017-04-04 DIAGNOSIS — Z90.410 POST-PANCREATECTOMY DIABETES (H): ICD-10-CM

## 2017-04-04 DIAGNOSIS — K59.09 CHRONIC CONSTIPATION: ICD-10-CM

## 2017-04-04 DIAGNOSIS — K86.89 PANCREATIC INSUFFICIENCY: ICD-10-CM

## 2017-04-04 DIAGNOSIS — R94.5 ABNORMAL RESULTS OF LIVER FUNCTION STUDIES: ICD-10-CM

## 2017-04-04 DIAGNOSIS — R74.8 ELEVATED LIVER ENZYMES: ICD-10-CM

## 2017-04-04 DIAGNOSIS — R10.9 ABDOMINAL PAIN: Primary | ICD-10-CM

## 2017-04-04 LAB
ALBUMIN SERPL-MCNC: 3.2 G/DL (ref 3.4–5)
ALP SERPL-CCNC: 438 U/L (ref 40–150)
ALT SERPL W P-5'-P-CCNC: 88 U/L (ref 0–50)
ANION GAP SERPL CALCULATED.3IONS-SCNC: 8 MMOL/L (ref 3–14)
AST SERPL W P-5'-P-CCNC: 55 U/L (ref 0–45)
BASOPHILS # BLD AUTO: 0.1 10E9/L (ref 0–0.2)
BASOPHILS NFR BLD AUTO: 2.6 %
BILIRUB DIRECT SERPL-MCNC: 0.1 MG/DL (ref 0–0.2)
BILIRUB SERPL-MCNC: 1 MG/DL (ref 0.2–1.3)
BUN SERPL-MCNC: 17 MG/DL (ref 7–30)
CALCIUM SERPL-MCNC: 8.9 MG/DL (ref 8.5–10.1)
CHLORIDE SERPL-SCNC: 107 MMOL/L (ref 94–109)
CHOLEST SERPL-MCNC: 146 MG/DL
CO2 SERPL-SCNC: 26 MMOL/L (ref 20–32)
CREAT SERPL-MCNC: 0.66 MG/DL (ref 0.52–1.04)
DIFFERENTIAL METHOD BLD: ABNORMAL
EOSINOPHIL # BLD AUTO: 0.6 10E9/L (ref 0–0.7)
EOSINOPHIL NFR BLD AUTO: 11.7 %
ERYTHROCYTE [DISTWIDTH] IN BLOOD BY AUTOMATED COUNT: 15.8 % (ref 10–15)
FERRITIN SERPL-MCNC: 5 NG/ML (ref 8–252)
GFR SERPL CREATININE-BSD FRML MDRD: NORMAL ML/MIN/1.7M2
GLUCOSE SERPL-MCNC: 91 MG/DL (ref 70–99)
HBA1C MFR BLD: 5.6 % (ref 4.3–6)
HCT VFR BLD AUTO: 33.4 % (ref 35–47)
HDLC SERPL-MCNC: 51 MG/DL
HGB BLD-MCNC: 10.4 G/DL (ref 11.7–15.7)
IMM GRANULOCYTES # BLD: 0 10E9/L (ref 0–0.4)
IMM GRANULOCYTES NFR BLD: 0 %
INR PPP: 1.07 (ref 0.86–1.14)
IRON SATN MFR SERPL: 7 % (ref 15–46)
IRON SERPL-MCNC: 22 UG/DL (ref 35–180)
LDLC SERPL CALC-MCNC: 85 MG/DL
LYMPHOCYTES # BLD AUTO: 2.2 10E9/L (ref 0.8–5.3)
LYMPHOCYTES NFR BLD AUTO: 43.3 %
MCH RBC QN AUTO: 25.7 PG (ref 26.5–33)
MCHC RBC AUTO-ENTMCNC: 31.1 G/DL (ref 31.5–36.5)
MCV RBC AUTO: 83 FL (ref 78–100)
MONOCYTES # BLD AUTO: 0.7 10E9/L (ref 0–1.3)
MONOCYTES NFR BLD AUTO: 13.7 %
NEUTROPHILS # BLD AUTO: 1.4 10E9/L (ref 1.6–8.3)
NEUTROPHILS NFR BLD AUTO: 28.7 %
NONHDLC SERPL-MCNC: 96 MG/DL
NRBC # BLD AUTO: 0 10*3/UL
NRBC BLD AUTO-RTO: 0 /100
PLATELET # BLD AUTO: 482 10E9/L (ref 150–450)
POTASSIUM SERPL-SCNC: 3.6 MMOL/L (ref 3.4–5.3)
PREALB SERPL IA-MCNC: 15 MG/DL (ref 15–45)
PROT SERPL-MCNC: 6.5 G/DL (ref 6.8–8.8)
RBC # BLD AUTO: 4.04 10E12/L (ref 3.8–5.2)
SODIUM SERPL-SCNC: 142 MMOL/L (ref 133–144)
TIBC SERPL-MCNC: 332 UG/DL (ref 240–430)
TRIGL SERPL-MCNC: 55 MG/DL
VIT B12 SERPL-MCNC: 1196 PG/ML (ref 193–986)
WBC # BLD AUTO: 5 10E9/L (ref 4–11)

## 2017-04-04 PROCEDURE — 83550 IRON BINDING TEST: CPT | Performed by: INTERNAL MEDICINE

## 2017-04-04 PROCEDURE — 80076 HEPATIC FUNCTION PANEL: CPT | Performed by: INTERNAL MEDICINE

## 2017-04-04 PROCEDURE — 82728 ASSAY OF FERRITIN: CPT | Performed by: INTERNAL MEDICINE

## 2017-04-04 PROCEDURE — 85014 HEMATOCRIT: CPT | Performed by: INTERNAL MEDICINE

## 2017-04-04 PROCEDURE — 84590 ASSAY OF VITAMIN A: CPT | Performed by: INTERNAL MEDICINE

## 2017-04-04 PROCEDURE — 84446 ASSAY OF VITAMIN E: CPT | Performed by: INTERNAL MEDICINE

## 2017-04-04 PROCEDURE — 82747 ASSAY OF FOLIC ACID RBC: CPT | Performed by: INTERNAL MEDICINE

## 2017-04-04 PROCEDURE — 82306 VITAMIN D 25 HYDROXY: CPT | Performed by: INTERNAL MEDICINE

## 2017-04-04 PROCEDURE — 80061 LIPID PANEL: CPT | Performed by: INTERNAL MEDICINE

## 2017-04-04 PROCEDURE — 85025 COMPLETE CBC W/AUTO DIFF WBC: CPT | Performed by: INTERNAL MEDICINE

## 2017-04-04 PROCEDURE — 99211 OFF/OP EST MAY X REQ PHY/QHP: CPT

## 2017-04-04 PROCEDURE — 82607 VITAMIN B-12: CPT | Performed by: INTERNAL MEDICINE

## 2017-04-04 PROCEDURE — 84134 ASSAY OF PREALBUMIN: CPT | Performed by: INTERNAL MEDICINE

## 2017-04-04 PROCEDURE — 83540 ASSAY OF IRON: CPT | Performed by: INTERNAL MEDICINE

## 2017-04-04 PROCEDURE — 85610 PROTHROMBIN TIME: CPT | Performed by: INTERNAL MEDICINE

## 2017-04-04 PROCEDURE — 80048 BASIC METABOLIC PNL TOTAL CA: CPT | Performed by: INTERNAL MEDICINE

## 2017-04-04 PROCEDURE — 84630 ASSAY OF ZINC: CPT | Performed by: INTERNAL MEDICINE

## 2017-04-04 NOTE — PROGRESS NOTES
"Transplant Surgery Progress Note    Medical record number: [id number]  YOB: 1965,   Date of Visit:  04/04/2017    S: Dinora comes to clinic for routine followup, with a specific finding of a tender palpable 'bump' in the left upper quadrant skin.    Transplant History:  Transplant: 12/28/2016 ([facility])   Donor Type: autologous     Transplant Coordinator: Mecca Watkins     Transplant Office Phone Number: [phone]     O: Vitals: /70 (BP Location: Right arm, Patient Position: Chair, Cuff Size: Adult Regular)  Pulse 63  Temp 98.2  F (36.8  C) (Oral)  Resp 16  Ht 1.727 m (5' 8\")  Wt 69.2 kg (152 lb 9.6 oz)  SpO2 96%  BMI 23.2 kg/m2  BMI= Body mass index is 23.2 kg/(m^2).  Focussed exam:    Abdomen: point specific tenderness in the left upper quadrant abdominal wall, localized to area of palpable 'nodule'. Much more apparent on exam when in standing position. Near old G tube exit site.    A/P:   1. LUQ subq nodule - US to determine if foreign body, neuroma or cystic lesion. Treatment will be either nerve block or excision depending on findings.    2. Chronic constipation: Amitiza trial.  Dose escalation q 5 days to max of 24 mcg po BID. Report back to coordinator for side effects/effectiveness.  3. 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  4. Pain and anxiety: will discuss with Dr. López.  May benefit from medication adjustment.    Total time: 25 minutes  Counselling time: 20 minutes            Nestor Phoenix MD  Department of Surgery    "

## 2017-04-04 NOTE — LETTER
"4/4/2017      RE: Dinora Mcghee  [address] [facility] [address]       Transplant Surgery Progress Note    Medical record number: [id number]  YOB: 1965,   Date of Visit:  04/04/2017    S: Dinora comes to clinic for routine followup, with a specific finding of a tender palpable 'bump' in the left upper quadrant skin.    Transplant History:  Transplant: 12/28/2016 ([facility])   Donor Type: autologous     Transplant Coordinator: Mecca Watkins     Transplant Office Phone Number: [phone]     O: Vitals: /70 (BP Location: Right arm, Patient Position: Chair, Cuff Size: Adult Regular)  Pulse 63  Temp 98.2  F (36.8  C) (Oral)  Resp 16  Ht 1.727 m (5' 8\")  Wt 69.2 kg (152 lb 9.6 oz)  SpO2 96%  BMI 23.2 kg/m2  BMI= Body mass index is 23.2 kg/(m^2).  Focussed exam:    Abdomen: point specific tenderness in the left upper quadrant abdominal wall, localized to area of palpable 'nodule'. Much more apparent on exam when in standing position. Near old G tube exit site.    A/P:   1. LUQ subq nodule - US to determine if foreign body, neuroma or cystic lesion. Treatment will be either nerve block or excision depending on findings.    2. Chronic constipation: Amitiza trial.  Dose escalation q 5 days to max of 24 mcg po BID. Report back to coordinator for side effects/effectiveness.  3. 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  4. Pain and anxiety: will discuss with Dr. López.  May benefit from medication adjustment.    Total time: 25 minutes  Counselling time: 20 minutes            Nestor Phoenxi MD  Department of Surgery      "

## 2017-04-04 NOTE — NURSING NOTE
"Chief Complaint   Patient presents with     TP-IAT Transplant     POD 97       Initial /70 (BP Location: Right arm, Patient Position: Chair, Cuff Size: Adult Regular)  Pulse 63  Temp 98.2  F (36.8  C) (Oral)  Resp 16  Ht 1.727 m (5' 8\")  Wt 69.2 kg (152 lb 9.6 oz)  SpO2 96%  BMI 23.2 kg/m2 Estimated body mass index is 23.2 kg/(m^2) as calculated from the following:    Height as of this encounter: 1.727 m (5' 8\").    Weight as of this encounter: 69.2 kg (152 lb 9.6 oz).  Medication Reconciliation: complete    "

## 2017-04-04 NOTE — LETTER
"4/4/2017       RE: Dinora Mcghee  [address] [facility] [address]     Dear Colleague,    Thank you for referring your patient, Dinora Mcghee, to the [facility] at [facility]. Please see a copy of my visit note below.    Transplant Surgery Progress Note    Medical record number: [id number]  YOB: 1965,   Date of Visit:  04/04/2017    S: ***  Transplant History:  Transplant: 12/28/2016 ([facility])   Donor Type:         Transplant Coordinator: Mecca Watkins     Transplant Office Phone Number: [phone]     O: Vitals: /70 (BP Location: Right arm, Patient Position: Chair, Cuff Size: Adult Regular)  Pulse 63  Temp 98.2  F (36.8  C) (Oral)  Resp 16  Ht 1.727 m (5' 8\")  Wt 69.2 kg (152 lb 9.6 oz)  SpO2 96%  BMI 23.2 kg/m2  BMI= Body mass index is 23.2 kg/(m^2).  Focussed exam:    Abdomen: ***    A/P:   1. LUQ subq nodule - US to determine if foreign body, neuroma or cystic lesion. Treatment will be either nerve block or excision depending on findings.    2. Chronic constipation: Amitiza trial.  Dose escalation q 5 days to max of 24 mcg po BID. Report back to coordinator for side effects/effectiveness.  3. 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  4. Pain and anxiety: will discuss with Dr. López.  May benefit from medication adjustment.    Total time: 25 minutes  Counselling time: 20 minutes            Nestor Phoenix MD  Department of Surgery      Again, thank you for allowing me to participate in the care of your patient.      Sincerely,    Nestor Phoenix MD      "

## 2017-04-04 NOTE — MR AVS SNAPSHOT
After Visit Summary   4/4/2017    Dinora Mcghee    MRN: [id number]           Patient Information     Date Of Birth          1965        Visit Information        Provider Department      4/4/2017 2:20 PM Nestor Phoenix MD [facility] Solid Organ Transplant        Today's Diagnoses     Abdominal wall pain    -  1    Pancreatic insufficiency        Post-pancreatectomy diabetes (H)        Chronic constipation           Follow-ups after your visit        Your next 10 appointments already scheduled     May 12, 2017  7:30 AM CDT   (Arrive by 7:15 AM)   New Patient Visit with Jesse Richardson [facility] ([facility])    [address] [place] [address]  3rd [facility] [address]   [phone]            Jun 19, 2017  7:00 AM CDT   (Arrive by 6:45 AM)   Return Visit with Sebastian López [facility] ([facility])    [address] [place] [address]  4th [facility] [address]   [phone]            Jun 20, 2017  8:30 AM CDT   LAB with  LAB   [facility] ([facility])    [address] [place] [address]   [phone]           Patient must bring picture ID.  Patient should be prepared to give a urine specimen  Please do not eat 10-12 hours before your appointment if you are coming in fasting for labs on lipids, cholesterol, or glucose (sugar).  Pregnant women should follow their Care Team instructions. Water with medications is okay. Do not drink coffee or other fluids.   If you have concerns about taking  your medications, please ask at office or if scheduling via [employer], send a message by clicking on Secure Messaging, Message Your Care Team.            Jun 20, 2017  9:00 AM CDT   Nurse Visit with [facility] Nurse   [facility] Solid Organ Transplant ([facility])    [address] [place] [address]  [address]   [phone]            Jun 20, 2017  2:00 PM CDT   (Arrive by 1:45 PM)   Return [place] with Nestor Phoenix MD   [facility] Solid Organ Transplant ([facility])    [address] [place] [address]  3rd [facility] [address]   [phone]            Aug 14, 2017  7:45 AM CDT   Lab with  LAB   [facility] ([facility])    [address] [facility]  1st [facility] [address]   [phone]            Aug 14, 2017  8:50 AM CDT   (Arrive by 8:35 AM)   Return General Liver with Ara Lugo MD   [facility] Hepatology ([facility])    [address] [place] [address]   [phone]              Future tests that were ordered for you today     Open Future Orders        Priority Expected Expires Ordered    Glucose Routine 6/20/2017 7/2/2017 5/2/2017    Glucose Routine 6/20/2017 7/2/2017 5/2/2017    Glucose Routine 6/20/2017 7/2/2017 5/2/2017    C-peptide Level Routine 6/20/2017 7/2/2017 5/2/2017    C-peptide Level Routine 6/20/2017 7/2/2017 5/2/2017    C-peptide Level Routine 6/20/2017 7/2/2017 5/2/2017    Hemoglobin A1c Routine 6/20/2017 7/2/2017 5/2/2017    Vitamin A Routine 6/20/2017 7/2/2017 5/2/2017    Vitamin B12 Routine 6/20/2017 7/2/2017 5/2/2017    Vitamin E Routine 6/20/2017 7/2/2017 5/2/2017    25 Hydroxyvitamin D2 and D3 Routine 6/20/2017 7/2/2017 5/2/2017    Comprehensive Metabolic Panel Routine 6/20/2017 7/2/2017 5/2/2017    CBC with platelets differential Routine 6/20/2017 7/2/2017 5/2/2017    Albumin Level Routine 6/20/2017 7/2/2017 5/2/2017    Prealbumin Routine 6/20/2017 7/2/2017 5/2/2017    IRON Routine 6/20/2017 7/2/2017 5/2/2017    IRON AND IRON BINDING CAPACITY Routine 6/20/2017 7/2/2017 5/2/2017    FERRITIN Routine 6/20/2017 7/2/2017 5/2/2017    Folate RBC Routine 6/20/2017 7/2/2017 5/2/2017    Hematocrit Routine 6/20/2017 7/2/2017 5/2/2017    Zinc Routine 6/20/2017  "7/2/2017 5/2/2017            Who to contact     If you have questions or need follow up information about today's clinic visit or your schedule please contact [facility]GAN TRANSPL[facility] directly at [phone].  Normal or non-critical lab and imaging results will be communicated to you by [employer], letter or phone within 4 business days after the clinic has received the results. If you do not hear from us within 7 days, please contact the clinic through [employer] or phone. If you have a critical or abnormal lab result, we will notify you by phone as soon as possible.  Submit refill requests through [employer] or call your pharmacy and they will forward the refill request to us. Please allow 3 business days for your refill to be completed.          Additional Information About Your Visit        [employer] Information     [employer] gives you secure access to your electronic health record. If you see a primary care provider, you can also send messages to your care team and make appointments. If you have questions, please call your primary care clinic.  If you do not have a primary care provider, please call [phone] and they will assist you.        Care EveryWhere ID     This is your Care EveryWhere ID. This could be used by other organizations to access your [facility] medical records  [id number]        Your Vitals Were     Pulse Temperature Respirations Height Pulse Oximetry BMI (Body Mass Index)    63 98.2  F (36.8  C) (Oral) 16 1.727 m (5' 8\") 96% 23.2 kg/m2       Blood Pressure from Last 3 Encounters:   04/25/17 114/78   04/17/17 107/67   04/10/17 101/62    Weight from Last 3 Encounters:   04/25/17 68.4 kg (150 lb 12.8 oz)   04/17/17 68.3 kg (150 lb 9.6 oz)   04/10/17 68.3 kg (150 lb 9.6 oz)              Today, you had the following     No orders found for display         Today's Medication Changes          These changes are accurate as of: 4/4/17 11:59 PM.  If you have any questions, ask your nurse or doctor.    "            Start taking these medicines.        Dose/Directions    Lubiprostone 8 MCG Caps capsule   Used for:  Chronic constipation   Started by:  Nestor Phoenix MD        Dose:  8 mcg   Take 1 capsule (8 mcg) by mouth 2 times daily   Quantity:  90 capsule   Refills:  3            Where to get your medicines      Some of these will need a paper prescription and others can be bought over the counter.  Ask your nurse if you have questions.     Bring a paper prescription for each of these medications     Lubiprostone 8 MCG Caps capsule                Primary Care Provider Office Phone # Fax #    Justyna Lawson [phone]       [facility] [place] [address]  [place] [address]        Thank you!     Thank you for choosing [facility]ANSPL[facility]  for your care. Our goal is always to provide you with excellent care. Hearing back from our patients is one way we can continue to improve our services. Please take a few minutes to complete the written survey that you may receive in the mail after your visit with us. Thank you!             Your Updated Medication List - Protect others around you: Learn how to safely use, store and throw away your medicines at www.disposemymeds.org.          This list is accurate as of: 4/4/17 11:59 PM.  Always use your most recent med list.                   Brand Name Dispense Instructions for use    amitriptyline 10 MG tablet    ELAVIL    60 tablet    Take 2 tablets (20 mg) by mouth At Bedtime       amylase-lipase-protease 41001 UNITS Cpep per EC capsule    CREON 24    180 capsule    Take 3-4 capsules by mouth with meals and 1-2 with snacks. Maximum 15 capsules per day.       aspirin 81 MG EC tablet     90 tablet    Take 1 tablet (81 mg) by mouth daily       BD SHARPS  Misc     1 each    1 Container as needed       blood glucose monitoring lancets     1 Box    Use to test blood sugar 6-8 times daily or as directed.       blood glucose monitoring  test strip    PAT CONTOUR NEXT    200 strip    Use to test blood sugar 6-8 times daily or as directed.       BOOST HIGH PROTEIN Liqd      After above baseline labs are drawn, give: 6 mL/kg to maximum of 360 mL; the beverage is to be consumed within 5 minutes.       celecoxib 100 MG capsule    celeBREX    60 capsule    Take 1 capsule (100 mg) by mouth 2 times daily       CONTOUR NEXT EZ MONITOR W/DEVICE Kit      1 Device 6 times daily       cyclobenzaprine 5 MG tablet    FLEXERIL    42 tablet    Take 5-10 mg by mouth three times per day as needed for muscle spasms.       diphenhydrAMINE 25 MG capsule    BENADRYL ALLERGY    56 capsule    Take 1 capsule (25 mg) by mouth every 6 hours as needed for itching or allergies       docusate sodium 100 MG capsule    COLACE    60 capsule    Take 1 capsule (100 mg) by mouth 2 times daily as needed for constipation,       glucagon 1 MG kit    GLUCAGON EMERGENCY    1 mg    Inject 1 mg into the muscle once for 1 dose       glucose 40 % Gel gel     3 Tube    Take 15-30 g by mouth every 15 minutes as needed for low blood sugar       Injection Device for insulin Tika    NOVOPEN ECHO    1 each    1 each daily as needed       * insulin aspart 100 UNIT/ML injection    NovoLOG PEN     aspart medium correction 1 unit per 50 > 130 every 4 hours ? For Pre-Meal  - 179 give 1 unit.  For Pre-Meal  - 230 give 2 units.  For Pre-Meal  - 281 give 3 units.  For Pre-Meal  - 332 give 4 units.       * insulin aspart 100 UNIT/ML injection    NovoLOG PENFILL    3 mL    Administer subcutaneously 0.5 unit per 45 grams of carbohydrates.       insulin pen needle 32G X 4 MM    BD KEN U/F    100 each    Use 6-8 times per day       LANsoprazole 30 MG CR capsule    PREVACID    30 capsule    Take 1 capsule (30 mg) by mouth daily Take 30-60 minutes before a meal.       levothyroxine 125 MCG tablet    SYNTHROID/LEVOTHROID    1 tablet    Take 1 tablet (125 mcg), by mouth daily before  breakfast.       Lubiprostone 8 MCG Caps capsule     90 capsule    Take 1 capsule (8 mcg) by mouth 2 times daily       multivitamin CF formula capsule    CHOICEFUL     Take 1 tablet by mouth daily       oxyCODONE 5 MG IR tablet    ROXICODONE    150 tablet    Take 1-2 tablets (5-10 mg) by mouth every 4 hours as needed for moderate to severe pain       polyethylene glycol Packet    MIRALAX/GLYCOLAX    7 packet    Take 17 g by mouth daily as needed for constipation       pregabalin 75 MG capsule    LYRICA    150 capsule    Take 1 capsule (75 mg) by mouth 2 times daily       prochlorperazine 5 MG tablet    COMPAZINE    90 tablet    Take two 5 mg tablets by mouth every six hours as needed for nausea.       senna-docusate 8.6-50 MG per tablet    SENOKOT-S;PERICOLACE    100 tablet    Take 1 tablet by mouth 2 times daily as needed for constipation       * Notice:  This list has 2 medication(s) that are the same as other medications prescribed for you. Read the directions carefully, and ask your doctor or other care provider to review them with you.

## 2017-04-05 ENCOUNTER — HOSPITAL ENCOUNTER (OUTPATIENT)
Facility: CLINIC | Age: 52
Setting detail: SPECIMEN
Discharge: HOME | End: 2017-04-05
Attending: PEDIATRICS | Admitting: PEDIATRICS
Payer: COMMERCIAL

## 2017-04-05 DIAGNOSIS — K59.01 SLOW TRANSIT CONSTIPATION: ICD-10-CM

## 2017-04-05 DIAGNOSIS — E89.1 POST-PANCREATECTOMY DIABETES (H): ICD-10-CM

## 2017-04-05 DIAGNOSIS — K86.89 PANCREATIC INSUFFICIENCY: ICD-10-CM

## 2017-04-05 DIAGNOSIS — E89.1 POST-PANCREATECTOMY DIABETES (H): Primary | ICD-10-CM

## 2017-04-05 DIAGNOSIS — E13.9 POST-PANCREATECTOMY DIABETES (H): ICD-10-CM

## 2017-04-05 DIAGNOSIS — E13.9 POST-PANCREATECTOMY DIABETES (H): Primary | ICD-10-CM

## 2017-04-05 DIAGNOSIS — Z90.410 POST-PANCREATECTOMY DIABETES (H): ICD-10-CM

## 2017-04-05 DIAGNOSIS — K59.00 CONSTIPATION: Primary | ICD-10-CM

## 2017-04-05 DIAGNOSIS — Z90.410 POST-PANCREATECTOMY DIABETES (H): Primary | ICD-10-CM

## 2017-04-05 DIAGNOSIS — Z90.410 HISTORY OF PANCREATECTOMY: ICD-10-CM

## 2017-04-05 LAB
C PEPTIDE SERPL-MCNC: 1.2 NG/ML (ref 0.9–6.9)
C PEPTIDE SERPL-MCNC: 2 NG/ML (ref 0.9–6.9)
C PEPTIDE SERPL-MCNC: 2.4 NG/ML (ref 0.9–6.9)
DEPRECATED CALCIDIOL+CALCIFEROL SERPL-MC: NORMAL UG/L (ref 20–75)
FOLATE RBC-MCNC: 815 NG/ML
GLUCOSE SERPL-MCNC: 100 MG/DL (ref 70–99)
GLUCOSE SERPL-MCNC: 109 MG/DL (ref 70–99)
GLUCOSE SERPL-MCNC: 109 MG/DL (ref 70–99)
HCT VFR BLD CALC: 33.4 %
VITAMIN D2 SERPL-MCNC: <5 UG/L
VITAMIN D3 SERPL-MCNC: 43 UG/L
ZINC SERPL-MCNC: 64 UG/ML

## 2017-04-05 PROCEDURE — 82947 ASSAY GLUCOSE BLOOD QUANT: CPT | Performed by: PEDIATRICS

## 2017-04-05 PROCEDURE — 84681 ASSAY OF C-PEPTIDE: CPT | Mod: 91 | Performed by: PEDIATRICS

## 2017-04-05 RX ORDER — LUBIPROSTONE 8 UG/1
8 CAPSULE ORAL 2 TIMES DAILY
Qty: 60 CAPSULE | Refills: 0 | Status: SHIPPED | OUTPATIENT
Start: 2017-04-05 | End: 2017-04-07 | Stop reason: DRUGHIGH

## 2017-04-06 LAB
A-TOCOPHEROL VIT E SERPL-MCNC: 8.8
ANNOTATION COMMENT IMP: ABNORMAL
BETA+GAMMA TOCOPHEROL SERPL-MCNC: 0.2 MG/DL
RETINYL PALMITATE SERPL-MCNC: ABNORMAL UG/ML
VIT A SERPL-MCNC: 0.24 UG/ML

## 2017-04-06 RX ORDER — LUBIPROSTONE 8 UG/1
8 CAPSULE ORAL 2 TIMES DAILY
Qty: 90 CAPSULE | Refills: 3 | Status: SHIPPED | OUTPATIENT
Start: 2017-04-06 | End: 2017-04-07

## 2017-04-07 ENCOUNTER — TELEPHONE (OUTPATIENT)
Dept: TRANSPLANT | Facility: CLINIC | Age: 52
End: 2017-04-07

## 2017-04-07 DIAGNOSIS — Z90.81 ACQUIRED ASPLENIA: ICD-10-CM

## 2017-04-07 DIAGNOSIS — E13.9 POST-PANCREATECTOMY DIABETES (H): ICD-10-CM

## 2017-04-07 DIAGNOSIS — K59.09 CHRONIC CONSTIPATION: ICD-10-CM

## 2017-04-07 DIAGNOSIS — K86.89 PANCREATIC INSUFFICIENCY: ICD-10-CM

## 2017-04-07 DIAGNOSIS — Z90.410 POST-PANCREATECTOMY DIABETES (H): ICD-10-CM

## 2017-04-07 DIAGNOSIS — K59.01 CONSTIPATION BY DELAYED COLONIC TRANSIT: Primary | ICD-10-CM

## 2017-04-07 DIAGNOSIS — S30.851A FOREIGN BODY OF ABDOMINAL WALL, INITIAL ENCOUNTER: Primary | ICD-10-CM

## 2017-04-07 DIAGNOSIS — E89.1 POST-PANCREATECTOMY DIABETES (H): ICD-10-CM

## 2017-04-07 RX ORDER — LUBIPROSTONE 8 UG/1
CAPSULE ORAL
Qty: 90 CAPSULE | Refills: 3 | Status: SHIPPED | OUTPATIENT
Start: 2017-04-07 | End: 2017-09-01

## 2017-04-07 RX ORDER — LUBIPROSTONE 8 UG/1
8 CAPSULE ORAL 2 TIMES DAILY
Qty: 90 CAPSULE | Refills: 3 | Status: SHIPPED | OUTPATIENT
Start: 2017-04-07 | End: 2017-04-07

## 2017-04-07 NOTE — TELEPHONE ENCOUNTER
Left M and contact information for Dinora to return my call. Also sent e-mail regarding her 4/17/17 [facility] 1:00 surgery time with Dr. Phoenix for removal of the abdominal foreign body. Notified Dr. Melissa as well for insulin/NPO management.

## 2017-04-10 ENCOUNTER — OFFICE VISIT (OUTPATIENT)
Dept: ANESTHESIOLOGY | Facility: CLINIC | Age: 52
End: 2017-04-10

## 2017-04-10 ENCOUNTER — OFFICE VISIT (OUTPATIENT)
Dept: GASTROENTEROLOGY | Facility: CLINIC | Age: 52
End: 2017-04-10
Attending: INTERNAL MEDICINE
Payer: COMMERCIAL

## 2017-04-10 VITALS
SYSTOLIC BLOOD PRESSURE: 101 MMHG | WEIGHT: 150.6 LBS | HEIGHT: 68 IN | OXYGEN SATURATION: 98 % | TEMPERATURE: 97.9 F | DIASTOLIC BLOOD PRESSURE: 62 MMHG | BODY MASS INDEX: 22.82 KG/M2 | HEART RATE: 85 BPM

## 2017-04-10 VITALS
OXYGEN SATURATION: 98 % | HEIGHT: 68 IN | SYSTOLIC BLOOD PRESSURE: 101 MMHG | TEMPERATURE: 97.9 F | BODY MASS INDEX: 22.82 KG/M2 | HEART RATE: 85 BPM | WEIGHT: 150.6 LBS | DIASTOLIC BLOOD PRESSURE: 62 MMHG

## 2017-04-10 DIAGNOSIS — G89.29 CHRONIC ABDOMINAL PAIN: ICD-10-CM

## 2017-04-10 DIAGNOSIS — F41.9 ANXIETY: ICD-10-CM

## 2017-04-10 DIAGNOSIS — G89.29 COPING WITH CHRONIC PAIN: Primary | ICD-10-CM

## 2017-04-10 DIAGNOSIS — Z90.410 HISTORY OF PANCREATECTOMY: ICD-10-CM

## 2017-04-10 DIAGNOSIS — R10.9 CHRONIC ABDOMINAL PAIN: ICD-10-CM

## 2017-04-10 DIAGNOSIS — R74.8 ELEVATED LIVER ENZYMES: Primary | ICD-10-CM

## 2017-04-10 DIAGNOSIS — F11.90 CHRONIC, CONTINUOUS USE OF OPIOIDS: ICD-10-CM

## 2017-04-10 DIAGNOSIS — M75.02 ADHESIVE CAPSULITIS OF LEFT SHOULDER: ICD-10-CM

## 2017-04-10 DIAGNOSIS — R76.8 IGG4 SELECTIVELY HIGH IN PLASMA: ICD-10-CM

## 2017-04-10 DIAGNOSIS — K86.1 CHRONIC PANCREATITIS, UNSPECIFIED PANCREATITIS TYPE (H): ICD-10-CM

## 2017-04-10 DIAGNOSIS — H04.123 DRY EYES: ICD-10-CM

## 2017-04-10 DIAGNOSIS — R68.2 DRY MOUTH: ICD-10-CM

## 2017-04-10 LAB
C3 SERPL-MCNC: 98 MG/DL (ref 76–169)
C4 SERPL-MCNC: 17 MG/DL (ref 15–50)
IGG SERPL-MCNC: 1190 MG/DL (ref 695–1620)
IGG1 SER-MCNC: NORMAL MG/DL (ref 300–856)
IGG2 SER-MCNC: NORMAL MG/DL (ref 158–761)
IGG3 SER-MCNC: NORMAL MG/DL (ref 24–192)
IGG4 SER-MCNC: NORMAL MG/DL (ref 11–86)
RHEUMATOID FACT SER NEPH-ACNC: <20 IU/ML (ref 0–20)

## 2017-04-10 PROCEDURE — 86431 RHEUMATOID FACTOR QUANT: CPT | Performed by: INTERNAL MEDICINE

## 2017-04-10 PROCEDURE — 82787 IGG 1 2 3 OR 4 EACH: CPT | Performed by: INTERNAL MEDICINE

## 2017-04-10 PROCEDURE — 82784 ASSAY IGA/IGD/IGG/IGM EACH: CPT | Performed by: INTERNAL MEDICINE

## 2017-04-10 PROCEDURE — 36415 COLL VENOUS BLD VENIPUNCTURE: CPT | Performed by: INTERNAL MEDICINE

## 2017-04-10 PROCEDURE — 99212 OFFICE O/P EST SF 10 MIN: CPT | Mod: ZF

## 2017-04-10 PROCEDURE — 84165 PROTEIN E-PHORESIS SERUM: CPT | Performed by: INTERNAL MEDICINE

## 2017-04-10 PROCEDURE — 82595 ASSAY OF CRYOGLOBULIN: CPT | Performed by: INTERNAL MEDICINE

## 2017-04-10 PROCEDURE — 86235 NUCLEAR ANTIGEN ANTIBODY: CPT | Performed by: INTERNAL MEDICINE

## 2017-04-10 PROCEDURE — 86039 ANTINUCLEAR ANTIBODIES (ANA): CPT | Performed by: INTERNAL MEDICINE

## 2017-04-10 PROCEDURE — 86160 COMPLEMENT ANTIGEN: CPT | Performed by: INTERNAL MEDICINE

## 2017-04-10 PROCEDURE — 00000402 ZZHCL STATISTIC TOTAL PROTEIN: Performed by: INTERNAL MEDICINE

## 2017-04-10 RX ORDER — LORAZEPAM 0.5 MG/1
0.5 TABLET ORAL EVERY 6 HOURS PRN
Qty: 20 TABLET | Refills: 0 | Status: SHIPPED | OUTPATIENT
Start: 2017-04-10 | End: 2017-08-04

## 2017-04-10 RX ORDER — CETIRIZINE HYDROCHLORIDE 10 MG/1
10 TABLET ORAL DAILY
COMMUNITY
End: 2020-08-26

## 2017-04-10 ASSESSMENT — PAIN SCALES - GENERAL
PAINLEVEL: MILD PAIN (2)
PAINLEVEL: MILD PAIN (2)

## 2017-04-10 NOTE — PROGRESS NOTES
PATIENT CONSULTATION       REASON FOR CONSULTATION:  Abnormal liver biopsy.      REFERRING PROVIDER:  Nestor Phoenix MD.      SUBJECTIVE:  Dinora Loo is a 52-year-old woman who underwent TPAIT on 12/28/2016 for chronic pancreatitis.  She had a previously noted liver lesion that was biopsied during that time, and this showed liver with noticeable proliferation and acute and chronic inflammation and fibrosis.  There was focally increased IgG4-positive plasma cells, consistent with IgG4 pseudotumor.  Her IgG4 level on 01/31/2017 was mildly elevated at 106 (upper limits of normal 86).      Prior to her TPAIT, she had chronic pancreatitis, having ERCPs and MRCPs last year.  ERCP was 08/16/2016.  Her last MRCP was 09/28/2016.  There was mild prominence in the intrahepatic bile ducts related to possible cholangitis previously.  It appeared similar to 04/29/2016. Her liver tests have been elevated, predominately alk phos, and these are improving. Most elevations of ALT and AST have been in the 1.5-2x above normal. Alk phos has been as high as 600s, but mostly 200-300.      Dinora is recovering from her TPAIT.  She had complications after the surgery requiring a feeding tube, which is now out.  There is also apparently some foreign body still embedded, and that needs to be removed.  She is quite fatigued, but is getting a little bit better.  She said she sleep very poorly, as she had some problems with her shoulder due to the surgery.      She also describes that she has had severe and longstanding food and seasonal allergies.  She is also allergic to cats.  She has not seen an allergist and years.  She takes Zyrtec in the summer, because her allergies are worse then.  She has severe dry eyes and dry mouth as well.  She has not seen a rheumatologist.  She said she had some eyedrops for the dry eyes, and she also needs to chew gum constantly.      She has also been diagnosed with hypothyroidism.  In addition, post TPIAT she has  significant constipation.      PAST MEDICAL HISTORY:  Other than what is listed above, status post RORY/BSO, appendectomy, and history of a pituitary adenoma.      MEDICATIONS:  Reviewed.      FAMILY HISTORY:  Parents are both still alive.  Father has some sort of immune joint destructive condition that is not rheumatoid arthritis, psoriatic arthritis or ankylosing spondylitis.  She does not know what it is exactly.  There is thyroid disease in her sister and her mother.  She has healthy children (3).      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  She is .  Her , Dusty, is here with her today.  She does not smoke or drink alcohol.  She was previously a .      REVIEW OF SYSTEMS:  Comprehensive review of systems is otherwise negative, except what is listed above in the history of present illness.      PHYSICAL EXAMINATION:   VITAL SIGNS:  Her blood pressure is 101/62, temperature 97.9, pulse 85, O2 sats 98%.  Weight is 150.  BMI is 23.   GENERAL:  Very pleasant, well-appearing, in no acute distress.   HEENT:  No icterus, no oral lesions.   LYMPH:  No supraclavicular or cervical lymphadenopathy.   CARDIOVASCULAR:  Regular rate and rhythm.   CHEST:  Lungs are clear.   ABDOMEN:  Bowel sounds are present.  Abdomen is soft, nontender, nondistended.  Scars are well-healed.  Liver is not enlarged.   EXTREMITIES:  No edema.   SKIN:  No rash.   NEUROLOGIC:  Speech is fluent and clear.  No asterixis or tremor.      LABORATORY DATA:  Reviewed.  Her liver tests are noted above.  In addition, INR is normal at 1.07.  Kidney function is normal, and UA is unremarkable.      ASSESSMENT AND PLAN:  A 52-year-old woman, status post TPAIT for chronic pancreatitis and recovering from this.      Lesion was seen in the liver and was biopsied, and was found to be consistent with an IgG4 pseudotumor.  Mildly elevated IgG4 level in her serum.  I am going  to recheck it today.  Whether she has other sites of IgG4 disease is questionable.  Certainly, her dry eyes and dry mouth may be related, and I would like her to see Rheumatology for this.  I did talk personally with Dr. Becerra about getting her in to be seen, and I to Dr Becerra talked about blood testing, which I have ordered.      In terms of liver disease,IgG 4 disease can cause a sclerosing cholangitis.  I would like her to have another MRI and MRCP to assess her bile ducts now post-transplant.  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  If she were to require treatment for IgG4 disease,typically  it would be with steroids, but I would discuss first with Dr. Phoenix to ensure that this was okay with her.  Rituximab is another option.  I would first like her to see Rheumatology and help tie some of these things together to determine whether she, in fact, she does have IgG4 disease, which is a highly variable and complex condition.  I will plan to see her back in 3-4 months after she has had the above evaluations.      Dinora's slides were reviewed at our pathology conference on Friday and I discussed her case with my hepatology colleagues     This was a 1 hour visit, over 50% counseling and coordination of care, and also I discussed with multiple other physicians.

## 2017-04-10 NOTE — LETTER
4/10/2017      RE: Dinora Mcghee  [address] [facility] [address]       PATIENT CONSULTATION       REASON FOR CONSULTATION:  Abnormal liver biopsy.      REFERRING PROVIDER:  Nestor Phoenix MD.      SUBJECTIVE:  Dinora Loo is a 52-year-old woman who underwent TPAIT on 12/28/2016 for chronic pancreatitis.  She had a previously noted liver lesion that was biopsied during that time, and this showed liver with noticeable proliferation and acute and chronic inflammation and fibrosis.  There was focally increased IgG4-positive plasma cells, consistent with IgG4 pseudotumor.  Her IgG4 level on 01/31/2017 was mildly elevated at 106 (upper limits of normal 86).      Prior to her TPAIT, she had chronic pancreatitis, having ERCPs and MRCPs last year.  ERCP was 08/16/2016.  Her last MRCP was 09/28/2016.  There was mild prominence in the intrahepatic bile ducts related to possible cholangitis previously.  It appeared similar to 04/29/2016. Her liver tests have been elevated, predominately alk phos, and these are improving. Most elevations of ALT and AST have been in the 1.5-2x above normal. Alk phos has been as high as 600s, but mostly 200-300.      Dinora is recovering from her TPAIT.  She had complications after the surgery requiring a feeding tube, which is now out.  There is also apparently some foreign body still embedded, and that needs to be removed.  She is quite fatigued, but is getting a little bit better.  She said she sleep very poorly, as she had some problems with her shoulder due to the surgery.      She also describes that she has had severe and longstanding food and seasonal allergies.  She is also allergic to cats.  She has not seen an allergist and years.  She takes Zyrtec in the summer, because her allergies are worse then.  She has severe dry eyes and dry mouth as well.  She has not seen a rheumatologist.  She said she had some eyedrops for the dry eyes, and she also needs to chew gum constantly.       She has also been diagnosed with hypothyroidism.  In addition, post TPIAT she has significant constipation.      PAST MEDICAL HISTORY:  Other than what is listed above, status post RORY/BSO, appendectomy, and history of a pituitary adenoma.      MEDICATIONS:  Reviewed.      FAMILY HISTORY:  Parents are both still alive.  Father has some sort of immune joint destructive condition that is not rheumatoid arthritis, psoriatic arthritis or ankylosing spondylitis.  She does not know what it is exactly.  There is thyroid disease in her sister and her mother.  She has healthy children (3).      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  She is .  Her , Dusty, is here with her today.  She does not smoke or drink alcohol.  She was previously a .      REVIEW OF SYSTEMS:  Comprehensive review of systems is otherwise negative, except what is listed above in the history of present illness.      PHYSICAL EXAMINATION:   VITAL SIGNS:  Her blood pressure is 101/62, temperature 97.9, pulse 85, O2 sats 98%.  Weight is 150.  BMI is 23.   GENERAL:  Very pleasant, well-appearing, in no acute distress.   HEENT:  No icterus, no oral lesions.   LYMPH:  No supraclavicular or cervical lymphadenopathy.   CARDIOVASCULAR:  Regular rate and rhythm.   CHEST:  Lungs are clear.   ABDOMEN:  Bowel sounds are present.  Abdomen is soft, nontender, nondistended.  Scars are well-healed.  Liver is not enlarged.   EXTREMITIES:  No edema.   SKIN:  No rash.   NEUROLOGIC:  Speech is fluent and clear.  No asterixis or tremor.      LABORATORY DATA:  Reviewed.  Her liver tests are noted above.  In addition, INR is normal at 1.07.  Kidney function is normal, and UA is unremarkable.      ASSESSMENT AND PLAN:  A 52-year-old woman, status post TPAIT for chronic pancreatitis and recovering from this.      Lesion was seen in the liver and was biopsied, and was found to be  consistent with an IgG4 pseudotumor.  Mildly elevated IgG4 level in her serum.  I am going to recheck it today.  Whether she has other sites of IgG4 disease is questionable.  Certainly, her dry eyes and dry mouth may be related, and I would like her to see Rheumatology for this.  I did talk personally with Dr. Becerra about getting her in to be seen, and I to Dr Becerra talked about blood testing, which I have ordered.      In terms of liver disease,IgG 4 disease can cause a sclerosing cholangitis.  I would like her to have another MRI and MRCP to assess her bile ducts now post-transplant.  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  If she were to require treatment for IgG4 disease,typically  it would be with steroids, but I would discuss first with Dr. Phoenix to ensure that this was okay with her.  Rituximab is another option.  I would first like her to see Rheumatology and help tie some of these things together to determine whether she, in fact, she does have IgG4 disease, which is a highly variable and complex condition.  I will plan to see her back in 3-4 months after she has had the above evaluations.      Dinora's slides were reviewed at our pathology conference on Friday and I discussed her case with my hepatology colleagues     This was a 1 hour visit, over 50% counseling and coordination of care, and also I discussed with multiple other physicians.           Ara Lugo MD

## 2017-04-10 NOTE — NURSING NOTE
LPN reviewed AVS with Pt includin. Trigger point injection to left shoulder. Call us with any concerns for infection: redness and drainage at the injection site, fever, chills, sweats     2. Continue physical therapy.    3. Continue to see psychologist to help manage anxiety.    4.  Recommend lidocaine spray or menthol ointment (Icy Hot) over the counter as needed to left shoulder.    5.  Prescription given for lorazepam 0.5mg every 6 hours as needed for anxiety.    DO NOT TAKE WITH OXYCODONE OR ALCOHOL      Follow up: 2 months     Pt verbalized an understanding of information, and was asked to contact clinic with questions.    Rossi Gomez LPN

## 2017-04-10 NOTE — MR AVS SNAPSHOT
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 [phone]   [email or web address]    Please be aware that coverage of these services is subject to the terms and limitations of your health insurance plan.  Call member services at your health plan with any benefit or coverage questions.      Please bring the following with you to your appointment:    (1) Any X-Rays, CTs or MRIs which have been performed.  Contact the facility where they were done to arrange for  prior to your scheduled appointment.    (2) List of current medications   (3) This referral request   (4) Any documents/labs given to you for this referral                  Your next 10 appointments already scheduled     Apr 17, 2017   Procedure with Nestor Phoenix MD   [facility] ([facility])    [address] [place] [address]  [address]th [place] [address]   [phone]           Located in the Clinics and Surgery Center at [address] [place] [address].   parking is very convenient and highly recommended.  is a $6 flat rate fee.  Both  and self parkers should enter the main arrival plaza from [facility]; parking attendants will direct you based on your parking preference.            Apr 20, 2017  3:00 PM CDT   (Arrive by 2:45 PM)   Return Visit with  Maria Elena Abdalla, PhD   [facility] ([facility])    [address]  4th Floor  [facility] [id number] [phone]            Apr 22, 2017  8:30 AM CDT   (Arrive by 8:15 AM)   MR ABDOMEN MRCP W/O & W CONTRAST with [id number]   [facility] MRI ([facility])    [address]  1st Floor  [facility] [id number] [phone]           Take your medicines as usual, unless your doctor tells you not to. Bring a list of your current medicines to your exam (including vitamins, minerals and over-the-counter drugs). Also bring the results of similar scans you may have had.    The day before your exam, drink extra fluids at least six 8-ounce glasses (unless your doctor tells you to restrict your fluids).   Have a blood test (creatinine test) within 30 days of your exam. Go to your clinic or Diagnostic Imaging Department for this test.   Do not eat or drink for 6 hours prior to exam.  The MRI machine uses a strong magnet. Please wear clothes without metal (snaps, zippers). A sweatsuit works well, or we may give you a hospital gown.  Please remove any body piercings and hair extensions before you arrive. You will also remove watches, jewelry, hairpins, wallets, dentures, partial dental plates and hearing aids. You may wear contact lenses, and you may be able to wear your rings. We have a safe place to keep your personal items, but it is safer to leave them at home.   **IMPORTANT** THE INSTRUCTIONS BELOW ARE ONLY FOR THOSE PATIENTS WHO HAVE BEEN TOLD THEY WILL RECEIVE SEDATION OR GENERAL ANESTHESIA DURING THEIR MRI PROCEDURE:  IF YOU WILL RECEIVE SEDATION (take medicine to help you relax during your exam):   You must get the medicine from your doctor before you arrive. Bring the medicine to the exam. Do not take it at home.   Arrive one hour early. Bring someone who can take you home after the test. Your  medicine will make you sleepy. After the exam, you may not drive, take a bus or take a taxi by yourself.   No eating 8 hours before your exam. You may have clear liquids up until 4 hours before your exam. (Clear liquids include water, clear tea, black coffee and fruit juice without pulp.)  IF YOU WILL RECEIVE ANESTHESIA (be asleep for your exam):   Arrive 1 1/2 hours early. Bring someone who can take you home after the test. You may not drive, take a bus or take a taxi by yourself.   No eating 8 hours before your exam. You may have clear liquids up until 4 hours before your exam. (Clear liquids include water, clear tea, black coffee and fruit juice without pulp.)  If you have any questions, please contact your Imaging Department exam site.            Jun 19, 2017  7:00 AM CDT   (Arrive by 6:45 AM)   Return Visit with Sebastian López DO   [facility] ([facility])    [address] [place] [address]  4th Floor  [facility] [id number] [phone]            Aug 14, 2017  7:45 AM CDT   Lab with  LAB   [facility] ([facility])    [address]  1st [facility] [id number] [phone]            Aug 14, 2017  8:50 AM CDT   (Arrive by 8:35 AM)   Return General Liver with Ara Lugo MD   [facility] Hepatology ([facility])    [address] [place] [address]  3rd [facility] [id number] [phone]              Who to contact     If you have questions or need follow up information about today's clinic visit or your schedule please contact [facility] HEPATOLOGY directly at [phone].  Normal or non-critical lab and imaging results will be communicated to you by MyChart, letter or phone within 4 business days after the clinic has received the results. If you do not hear from us within 7 days, please contact the clinic through MyChart or phone. If you have a critical or  "abnormal lab result, we will notify you by phone as soon as possible.  Submit refill requests through [employer] or call your pharmacy and they will forward the refill request to us. Please allow 3 business days for your refill to be completed.          Additional Information About Your Visit        [employer] Information     [employer] gives you secure access to your electronic health record. If you see a primary care provider, you can also send messages to your care team and make appointments. If you have questions, please call your primary care clinic.  If you do not have a primary care provider, please call [phone] and they will assist you.        Care EveryWhere ID     This is your Care EveryWhere ID. This could be used by other organizations to access your [facility] medical records  [id number]        Your Vitals Were     Pulse Temperature Height Pulse Oximetry BMI (Body Mass Index)       85 97.9  F (36.6  C) (Oral) 1.727 m (5' 8\") 98% 22.9 kg/m2        Blood Pressure from Last 3 Encounters:   04/10/17 101/62   04/10/17 101/62   04/04/17 106/70    Weight from Last 3 Encounters:   04/10/17 68.3 kg (150 lb 9.6 oz)   04/10/17 68.3 kg (150 lb 9.6 oz)   04/04/17 69.2 kg (152 lb 9.6 oz)              We Performed the Following     [facility] Miscellaneous Test     Complement C3     Complement C4     Cryoglobulin quantitative     IgG Subclasses     Protein electrophoresis     Rheumatoid factor     RHEUMATOLOGY REFERRAL          Today's Medication Changes          These changes are accurate as of: 4/10/17 11:59 PM.  If you have any questions, ask your nurse or doctor.               These medicines have changed or have updated prescriptions.        Dose/Directions    LORazepam 0.5 MG tablet   Commonly known as:  ATIVAN   This may have changed:  how much to take   Used for:  History of pancreatectomy, Anxiety   Changed by:  Sebastian López, DO        Dose:  0.5 mg   Take 1 tablet (0.5 mg) by mouth every 6 hours as needed " for anxiety   Quantity:  20 tablet   Refills:  0            Where to get your medicines      Some of these will need a paper prescription and others can be bought over the counter.  Ask your nurse if you have questions.     Bring a paper prescription for each of these medications     LORazepam 0.5 MG tablet                Primary Care Provider Office Phone # Fax #    Justyna Lawson [phone]       [facility] [place] [address]  [place] [address]        Thank you!     Thank you for choosing [facility] HEPATOLOGY  for your care. Our goal is always to provide you with excellent care. Hearing back from our patients is one way we can continue to improve our services. Please take a few minutes to complete the written survey that you may receive in the mail after your visit with us. Thank you!             Your Updated Medication List - Protect others around you: Learn how to safely use, store and throw away your medicines at www.disposemymeds.org.          This list is accurate as of: 4/10/17 11:59 PM.  Always use your most recent med list.                   Brand Name Dispense Instructions for use    amitriptyline 10 MG tablet    ELAVIL    60 tablet    Take 2 tablets (20 mg) by mouth At Bedtime       amylase-lipase-protease 65091 UNITS Cpep per EC capsule    CREON 24    180 capsule    Take 3-4 capsules by mouth with meals and 1-2 with snacks. Maximum 15 capsules per day.       aspirin 81 MG EC tablet     90 tablet    Take 1 tablet (81 mg) by mouth daily       BD SHARPS  Misc     1 each    1 Container as needed       blood glucose monitoring lancets     1 Box    Use to test blood sugar 6-8 times daily or as directed.       blood glucose monitoring test strip    PTA CONTOUR NEXT    200 strip    Use to test blood sugar 6-8 times daily or as directed.       BOOST HIGH PROTEIN Liqd      After above baseline labs are drawn, give: 6 mL/kg to maximum of 360 mL; the beverage is to be consumed  within 5 minutes.       celecoxib 100 MG capsule    celeBREX    60 capsule    Take 1 capsule (100 mg) by mouth 2 times daily       cetirizine 10 MG tablet    zyrTEC     Take 10 mg by mouth daily       CONTOUR NEXT EZ MONITOR W/DEVICE Kit      1 Device 6 times daily       cyclobenzaprine 5 MG tablet    FLEXERIL    42 tablet    Take 5-10 mg by mouth three times per day as needed for muscle spasms.       diphenhydrAMINE 25 MG capsule    BENADRYL ALLERGY    56 capsule    Take 1 capsule (25 mg) by mouth every 6 hours as needed for itching or allergies       docusate sodium 100 MG capsule    COLACE    60 capsule    Take 1 capsule (100 mg) by mouth 2 times daily as needed for constipation,       glucagon 1 MG kit    GLUCAGON EMERGENCY    1 mg    Inject 1 mg into the muscle once for 1 dose       glucose 40 % Gel gel     3 Tube    Take 15-30 g by mouth every 15 minutes as needed for low blood sugar       Injection Device for insulin Tika    NOVOPEN ECHO    1 each    1 each daily as needed       * insulin aspart 100 UNIT/ML injection    NovoLOG PEN     aspart medium correction 1 unit per 50 > 130 every 4 hours ? For Pre-Meal  - 179 give 1 unit.  For Pre-Meal  - 230 give 2 units.  For Pre-Meal  - 281 give 3 units.  For Pre-Meal  - 332 give 4 units.       * insulin aspart 100 UNIT/ML injection    NovoLOG PENFILL    3 mL    Administer subcutaneously 0.5 unit per 45 grams of carbohydrates.       insulin glargine 100 UNIT/ML injection    LANTUS    3 mL    Inject 4 units subcutaneously every morning.       insulin pen needle 32G X 4 MM    BD KEN U/F    100 each    Use 6-8 times per day       LANsoprazole 30 MG CR capsule    PREVACID    30 capsule    Take 1 capsule (30 mg) by mouth daily Take 30-60 minutes before a meal.       levothyroxine 125 MCG tablet    SYNTHROID/LEVOTHROID    1 tablet    Take 1 tablet (125 mcg), by mouth daily before breakfast.       LORazepam 0.5 MG tablet    ATIVAN    20 tablet     Take 1 tablet (0.5 mg) by mouth every 6 hours as needed for anxiety       Lubiprostone 8 MCG Caps capsule     90 capsule    Take 1 capsule (8 mcg) by mouth 2 times daily. Take 1 capsule by mouth po BID x 5 days. If no effect, increase to 2 capsules po BID x 5 days. If no effect, increase to 3 capsules po BID and continue until further instruction.       magnesium hydroxide 400 MG/5ML suspension    MILK OF MAGNESIA    480 mL    Take 30-60 mLs by mouth daily as needed for constipation.       multivitamin CF formula capsule    CHOICEFUL     Take 1 tablet by mouth daily       oxyCODONE 5 MG IR tablet    ROXICODONE    150 tablet    Take 1-2 tablets (5-10 mg) by mouth every 4 hours as needed for moderate to severe pain       polyethylene glycol Packet    MIRALAX/GLYCOLAX    7 packet    Take 17 g by mouth daily as needed for constipation       pregabalin 75 MG capsule    LYRICA    150 capsule    Take 1 capsule (75 mg) by mouth 2 times daily       * prochlorperazine 5 MG tablet    COMPAZINE    56 tablet    Take 2 tabs by mouth every 6 hours as needed for nausea.       * prochlorperazine 5 MG tablet    COMPAZINE    90 tablet    Take two 5 mg tablets by mouth every six hours as needed for nausea.       senna-docusate 8.6-50 MG per tablet    SENOKOT-S;PERICOLACE    100 tablet    Take 1 tablet by mouth 2 times daily as needed for constipation       * Notice:  This list has 4 medication(s) that are the same as other medications prescribed for you. Read the directions carefully, and ask your doctor or other care provider to review them with you.

## 2017-04-10 NOTE — NURSING NOTE
Chief Complaint   Patient presents with     RECHECK     Elevated Liver Count   Pt roomed, vitals, meds, and allergies reviewed with pt. Pt ready for provider.  Douglas Devries, CMA

## 2017-04-10 NOTE — MR AVS SNAPSHOT
After Visit Summary   4/10/2017    Dinora Mcghee    MRN: [id number]           Patient Information     Date Of Birth          1965        Visit Information        Provider Department      4/10/2017 11:00 AM Sebastian López [facility]        Today's Diagnoses     History of pancreatectomy        Anxiety          Care Instructions    1. Trigger point injection to left shoulder. Call us with any concerns for infection: redness and drainage at the injection site, fever, chills, sweats     2. Continue physical therapy.    3. Continue to see psychologist to help manage anxiety.    4.  Recommend lidocaine spray or menthol ointment (Icy Hot) over the counter as needed to left shoulder.    5.  Prescription given for lorazepam 0.5mg every 6 hours as needed for anxiety.    DO NOT TAKE WITH OXYCODONE OR ALCOHOL      Follow up: 2 months       To speak with a nurse, schedule/reschedule/cancel a clinic appointment, or request a medication refill call: [phone]     You can also reach us by [employer]: [email or web address]    For refills, please call on Monday, 1 week before your medication runs out. The doctors are not always in clinic, so this gives us time to get your prescriptions ready.  Please let us know the name of the medication you are requesting a refill of.                                   Follow-ups after your visit        Your next 10 appointments already scheduled     Apr 17, 2017   Procedure with Nestor Phoenix MD   [facility] ([facility])    [address] [place], [place] [id number] [phone]           Located in the Clinics and Surgery Center at [address] [place] [address].   parking is very convenient and highly recommended.  is a $6 flat rate fee.  Both  and self parkers should enter the main arrival plaza from [address]; parking attendants will direct you based on your parking preference.            Apr 22, 2017  8:30 AM CDT   (Arrive by 8:15 AM)   MR ABDOMEN MRCP W/O & W CONTRAST with [id number]   [facility] ([facility])    [address]  1st Floor  [facility] [address] [phone]           Take your medicines as usual, unless your doctor tells you not to. Bring a list of your current medicines to your exam (including vitamins, minerals and over-the-counter drugs). Also bring the results of similar scans you may have had.    The day before your exam, drink extra fluids at least six 8-ounce glasses (unless your doctor tells you to restrict your fluids).   Have a blood test (creatinine test) within 30 days of your exam. Go to your clinic or Diagnostic Imaging Department for this test.   Do not eat or drink for 6 hours prior to exam.  The MRI machine uses a strong magnet. Please wear clothes without metal (snaps, zippers). A sweatsuit works well, or we may give you a hospital gown.  Please remove any body piercings and hair extensions before you arrive. You will also remove watches, jewelry, hairpins, wallets, dentures, partial dental plates and hearing aids. You may wear contact lenses, and you may be able to wear your rings. We have a safe place to keep your personal items, but it is safer to leave them at home.   **IMPORTANT** THE INSTRUCTIONS BELOW ARE ONLY FOR THOSE PATIENTS WHO HAVE BEEN TOLD THEY WILL RECEIVE SEDATION OR GENERAL ANESTHESIA DURING THEIR MRI PROCEDURE:  IF YOU WILL RECEIVE SEDATION (take medicine to help you relax during your exam):   You must get the medicine from your doctor before you arrive. Bring the medicine to the exam. Do not take it at home.   Arrive one hour early. Bring someone who can take you home after the test. Your medicine will make you sleepy. After the exam, you may not drive, take a bus or take a taxi by yourself.   No eating 8  hours before your exam. You may have clear liquids up until 4 hours before your exam. (Clear liquids include water, clear tea, black coffee and fruit juice without pulp.)  IF YOU WILL RECEIVE ANESTHESIA (be asleep for your exam):   Arrive 1 1/2 hours early. Bring someone who can take you home after the test. You may not drive, take a bus or take a taxi by yourself.   No eating 8 hours before your exam. You may have clear liquids up until 4 hours before your exam. (Clear liquids include water, clear tea, black coffee and fruit juice without pulp.)  If you have any questions, please contact your Imaging Department exam site.            Jun 19, 2017  7:00 AM CDT   (Arrive by 6:45 AM)   Return Visit with Sebastian López DO   [facility] ([facility])    [address] [place] [address]  4th [facility] [id number] [phone]            Aug 14, 2017  7:45 AM CDT   Lab with  LAB   [facility] ([facility])    [address] [place] [address]  1st [facility] [id number] [phone]            Aug 14, 2017  8:50 AM CDT   (Arrive by 8:35 AM)   Return General Liver with Ara Lugo MD   [facility] ([facility])    [address] [place] [address]  3rd [facility] [id number] [phone]              Future tests that were ordered for you today     Open Standing Orders        Priority Remaining Interval Expires Ordered    SSA Ro SHANEL Antibody IgG Routine 1/1 AM DRAW  4/10/2017    SSB La SHANEL Antibody IgG Routine 1/1 AM DRAW  4/10/2017    Nuclear Antibody AMARI by IFA IgG Routine 1/1 AM DRAW  4/10/2017          Open Future Orders        Priority Expected Expires Ordered    MRI Abdomen w & w/o contrast mrcp Routine 4/10/2017 5/10/2017 4/10/2017            Who to contact     Please call your clinic at [phone] to:    Ask questions about your health    Make or cancel  "appointments    Discuss your medicines    Learn about your test results    Speak to your doctor   If you have compliments or concerns about an experience at your clinic, or if you wish to file a complaint, please contact [facility] Patient Relations at [phone] or email us at [email or web address]         Additional Information About Your Visit        ilabhart Information     [employer] gives you secure access to your electronic health record. If you see a primary care provider, you can also send messages to your care team and make appointments. If you have questions, please call your primary care clinic.  If you do not have a primary care provider, please call [phone] and they will assist you.      [employer] is an electronic gateway that provides easy, online access to your medical records. With [employer], you can request a clinic appointment, read your test results, renew a prescription or communicate with your care team.     To access your existing account, please contact your [facility] or call [phone] for assistance.        Care EveryWhere ID     This is your Care EveryWhere ID. This could be used by other organizations to access your [facility] medical records  [id number]        Your Vitals Were     Pulse Temperature Height Pulse Oximetry BMI (Body Mass Index)       85 97.9  F (36.6  C) (Oral) 1.727 m (5' 7.99\") 98% 22.9 kg/m2        Blood Pressure from Last 3 Encounters:   04/10/17 101/62   04/10/17 101/62   04/04/17 106/70    Weight from Last 3 Encounters:   04/10/17 68.3 kg (150 lb 9.6 oz)   04/10/17 68.3 kg (150 lb 9.6 oz)   04/04/17 69.2 kg (152 lb 9.6 oz)              Today, you had the following     No orders found for display         Today's Medication Changes          These changes are accurate as of: 4/10/17 12:08 PM.  If you have any questions, ask your nurse or doctor.               These medicines have changed or have " updated prescriptions.        Dose/Directions    LORazepam 0.5 MG tablet   Commonly known as:  ATIVAN   This may have changed:  how much to take   Used for:  History of pancreatectomy, Anxiety   Changed by:  Sebastian López DO        Dose:  0.5 mg   Take 1 tablet (0.5 mg) by mouth every 6 hours as needed for anxiety   Quantity:  20 tablet   Refills:  0            Where to get your medicines      Some of these will need a paper prescription and others can be bought over the counter.  Ask your nurse if you have questions.     Bring a paper prescription for each of these medications     LORazepam 0.5 MG tablet                Primary Care Provider Office Phone # Fax #    Justyna Lawson [phone]       [facility] [address]  [address] [facility] [address]        Thank you!     Thank you for choosing [facility]  for your care. Our goal is always to provide you with excellent care. Hearing back from our patients is one way we can continue to improve our services. Please take a few minutes to complete the written survey that you may receive in the mail after your visit with us. Thank you!             Your Updated Medication List - Protect others around you: Learn how to safely use, store and throw away your medicines at www.disposemymeds.org.          This list is accurate as of: 4/10/17 12:08 PM.  Always use your most recent med list.                   Brand Name Dispense Instructions for use    amitriptyline 10 MG tablet    ELAVIL    60 tablet    Take 2 tablets (20 mg) by mouth At Bedtime       amylase-lipase-protease 01470 UNITS Cpep per EC capsule    CREON 24    180 capsule    Take 3-4 capsules by mouth with meals and 1-2 with snacks. Maximum 15 capsules per day.       aspirin 81 MG EC tablet     90 tablet    Take 1 tablet (81 mg) by mouth daily       BD SHARPS  Misc     1 each    1 Container as needed       blood glucose monitoring  lancets     1 Box    Use to test blood sugar 6-8 times daily or as directed.       blood glucose monitoring test strip    PAT CONTOUR NEXT    200 strip    Use to test blood sugar 6-8 times daily or as directed.       BOOST HIGH PROTEIN Liqd      After above baseline labs are drawn, give: 6 mL/kg to maximum of 360 mL; the beverage is to be consumed within 5 minutes.       celecoxib 100 MG capsule    celeBREX    60 capsule    Take 1 capsule (100 mg) by mouth 2 times daily       cetirizine 10 MG tablet    zyrTEC     Take 10 mg by mouth daily       CONTOUR NEXT EZ MONITOR W/DEVICE Kit      1 Device 6 times daily       cyclobenzaprine 5 MG tablet    FLEXERIL    42 tablet    Take 5-10 mg by mouth three times per day as needed for muscle spasms.       diphenhydrAMINE 25 MG capsule    BENADRYL ALLERGY    56 capsule    Take 1 capsule (25 mg) by mouth every 6 hours as needed for itching or allergies       docusate sodium 100 MG capsule    COLACE    60 capsule    Take 1 capsule (100 mg) by mouth 2 times daily as needed for constipation,       glucagon 1 MG kit    GLUCAGON EMERGENCY    1 mg    Inject 1 mg into the muscle once for 1 dose       glucose 40 % Gel gel     3 Tube    Take 15-30 g by mouth every 15 minutes as needed for low blood sugar       Injection Device for insulin Tika    NOVOPEN ECHO    1 each    1 each daily as needed       * insulin aspart 100 UNIT/ML injection    NovoLOG PEN     aspart medium correction 1 unit per 50 > 130 every 4 hours ? For Pre-Meal  - 179 give 1 unit.  For Pre-Meal  - 230 give 2 units.  For Pre-Meal  - 281 give 3 units.  For Pre-Meal  - 332 give 4 units.       * insulin aspart 100 UNIT/ML injection    NovoLOG PENFILL    3 mL    Administer subcutaneously 0.5 unit per 45 grams of carbohydrates.       insulin glargine 100 UNIT/ML injection    LANTUS    3 mL    Inject 4 units subcutaneously every morning.       insulin pen needle 32G X 4 MM    BD KEN U/F    100 each     Use 6-8 times per day       LANsoprazole 30 MG CR capsule    PREVACID    30 capsule    Take 1 capsule (30 mg) by mouth daily Take 30-60 minutes before a meal.       levothyroxine 125 MCG tablet    SYNTHROID/LEVOTHROID    1 tablet    Take 1 tablet (125 mcg), by mouth daily before breakfast.       LORazepam 0.5 MG tablet    ATIVAN    20 tablet    Take 1 tablet (0.5 mg) by mouth every 6 hours as needed for anxiety       Lubiprostone 8 MCG Caps capsule     90 capsule    Take 1 capsule (8 mcg) by mouth 2 times daily. Take 1 capsule by mouth po BID x 5 days. If no effect, increase to 2 capsules po BID x 5 days. If no effect, increase to 3 capsules po BID and continue until further instruction.       magnesium hydroxide 400 MG/5ML suspension    MILK OF MAGNESIA    480 mL    Take 30-60 mLs by mouth daily as needed for constipation.       multivitamin CF formula capsule    CHOICEFUL     Take 1 tablet by mouth daily       oxyCODONE 5 MG IR tablet    ROXICODONE    150 tablet    Take 1-2 tablets (5-10 mg) by mouth every 4 hours as needed for moderate to severe pain       polyethylene glycol Packet    MIRALAX/GLYCOLAX    7 packet    Take 17 g by mouth daily as needed for constipation       pregabalin 75 MG capsule    LYRICA    150 capsule    Take 1 capsule (75 mg) by mouth 2 times daily       * prochlorperazine 5 MG tablet    COMPAZINE    56 tablet    Take 2 tabs by mouth every 6 hours as needed for nausea.       * prochlorperazine 5 MG tablet    COMPAZINE    90 tablet    Take two 5 mg tablets by mouth every six hours as needed for nausea.       senna-docusate 8.6-50 MG per tablet    SENOKOT-S;PERICOLACE    100 tablet    Take 1 tablet by mouth 2 times daily as needed for constipation       * Notice:  This list has 4 medication(s) that are the same as other medications prescribed for you. Read the directions carefully, and ask your doctor or other care provider to review them with you.

## 2017-04-10 NOTE — PATIENT INSTRUCTIONS
1. Trigger point injection to left shoulder. Call us with any concerns for infection: redness and drainage at the injection site, fever, chills, sweats     2. Continue physical therapy.    3. Continue to see psychologist to help manage anxiety.    4.  Recommend lidocaine spray or menthol ointment (Icy Hot) over the counter as needed to left shoulder.    5.  Prescription given for lorazepam 0.5mg every 6 hours as needed for anxiety.    DO NOT TAKE WITH OXYCODONE OR ALCOHOL      Follow up: 2 months       To speak with a nurse, schedule/reschedule/cancel a clinic appointment, or request a medication refill call: [phone]     You can also reach us by [employer]: [email or web address]    For refills, please call on Monday, 1 week before your medication runs out. The doctors are not always in clinic, so this gives us time to get your prescriptions ready.  Please let us know the name of the medication you are requesting a refill of.

## 2017-04-10 NOTE — PROGRESS NOTES
Chronic Pain Management Outpatient Clinic Progress Note    Interval history:  Dinora Mcghee is a 51 year old female last seen by me on 12/7/16.      Recommendations/plan at the last visit included:  1. Physical Therapy: Continue HEP and abdominal exercises  2. Clinical Health Psychologist to address issues of relaxation, behavioral change, coping style, and other factors important to improvement. She is continuing her therapy.  3. Diagnostic Studies: None  4. Medication Management: Refill Oxycodone - will discuss wean after the perioperative period and recovery. Lyrica increased to 50mg BID, continue celebrex. Can increase Tylenol to 2gm daily to alternate with celebrex.  5. Further procedures recommended: None at this time  6. Recommendations to PCP:Recommend inpatient pain management consult if needed to plan opioid dosing schedule on discharge after surgery.  7. Follow up: Early February      Patient Name:  Dinora Mcghee  Primary Provider:  Justyna Lawson    Chief Complaint: follow up, shoulder pain    Interim History:  Dinora Mcghee 52 year old female returns with her  to discuss pain management. She was last seen in pain clinic 12/7/16 prior to total pancreatectomy w/ islet cell transplantation.    She is doing quite well since her surgery. However, she has struggled with a left frozen shoulder since right after surgery. She's currently in PT (now 5th week). Pain and mobility generally improving, but still having restrictions in primarily abduction and internal rotation. She's using ice, heat, tylenol PRN, oxycodone PRN, and flexeril every once and a while (about 4 times per week). She's not using any topical creams or ointments.    A foreign body was found next to feeding tube site, and this is planned for removal next Monday. She has occasional pain at this site, otherwise having twinges of abdominal pain at incision sites. Overall, her abdominal pain is minimal. She is also  "eating by mouth, no tube feeds! She is having a \"weird pattern\" of bowel movements. Will go to the bathroom many times in a row, then no BM for 3 days. Planning to start lubiprostone soon through Dr. Phoenix.    For pain, she's currently taking on Lyrica 75 mg BID and Celebrex 100 mg BID with tylenol 1000 mg PRN (although she says this isn't that helpful). Last opioid use was 2 nights ago (2.5 mg) for frozen shoulder related pain. She's not taking opioids for stomach pain since over 2 weeks ago.    She is taking lorazepam for anxiety and sleep, 0.25mg once or twice per day. She is anxious about her pain, but also about a liver biopsy result. She's waking up a few nights during the week in \"panic\". Her PCP recently rx an SNRI, but she has not started this yet. She is still seeing a therapist and doing EMDR.     She is still on amitriptyline 20 mg (has been on for years for migraines).      Social History:  Pertinent changes to social history/social situation since last visit: Still not working, trying to get \"back into life\". Exploring other job opportunities.    Social History   Substance Use Topics     Smoking status: Former Smoker     Packs/day: 0.50     Years: 6.00     Types: Cigarettes     Start date: 9/1/1985     Quit date: 1/1/1992     Smokeless tobacco: Never Used      Comment: no 2nd hand     Alcohol use No      Comment: last drink 6/2016  - moderate social         Review of Systems:   ROS: 10 point ROS neg other than the symptoms noted above in the HPI.            Physical Exam:   Vitals were reviewed  /62  Pulse 85  Temp 97.9  F (36.6  C) (Oral)  Ht 1.727 m (5' 7.99\")  Wt 68.3 kg (150 lb 9.6 oz)  SpO2 98%  BMI 22.9 kg/m2  General: Alert, comfortable appearing female in no acute distress.   Eyes: Pupils equal, sclera clear.   ENT: MMM. No ulcerations or plaques.   Cardiac: Normal rate, well perfused  Resp: Unlabored on room air.   Abd: Soft, non-distended, nontender multiple incisional scars   Ext: " Left shoulder- grossly normal appearing, limited passive and active abduction, internal rotation, and flexion. +mild tenderness palpated over AC joint.   Neuro: No facial asymmetry.  Spontaneous movements grossly non-focal.  Normal gait.   Neuropsych: Alert, appropriately interactive, full affect.       Impression & Recommendations & Counselin.) Chronic Abdominal Pain  2.) Chronic Biliary Pancreatitis s/p TP-IATT 2016  3.) Left shoulder pain 2/2 adhesive capsulitis   4.) Anxiety and insomnia     Dinora Mcghee is a 51 year old female who is here for pain management s/p TP-IAT in Dec 2016. She is overall doing very well from her surgery and has minimal abdominal pain. She is, however, having ongoing pain and limited mobility in her left shoulder due to adhesive capsulitis, which is slowly improving. She is also struggling with anxiety and insomnia. Plan today is as follows:    8. Physical Therapy: Continue therapy for shoulder.   9. Clinical Health Psychologist to address issues of relaxation, behavioral change, coping style, and other factors important to improvement. She is continuing her therapy, using EMDR.  10. Diagnostic Studies: None.  11. Medication Management: Refill for Lorazepam 0.5mg q 6 PRN primarily for sleep anxiety (20 tabs provided)- instructed to talk with PCP about SNRI use and long term anxiety management. Instructed to not use opioids while taking lorazepam. Will continue Lyrica, Celebrex, tylenol PRN. Also, instructed to keep using heat and ice.  We discussed that her Lorazepam usage is a temporary therapy until her shoulder improves and her liver work-up is complete - which are the main contributors to her anxiety.  Overall long term management of anxiety would be best achieved by therapy and her SNRI.    12. Procedures: Left Shoulder SA bursa injection today with local only - will avoid steroids in this transplant patient.  13. Recommendations to PCP: Anxiety and insomnia follow  up.  14. Follow up: 2-3 months    Patient seen and examined by Dr. Marshal SOLER  Hospice and Palliative Medicine Fellow  [facility]          Additional information reviewed today:   Allergies   Allergen Reactions     Apple Anaphylaxis     Depakote [Divalproex Sodium] Other (See Comments)     Chest pain     Zithromax [Azithromycin Dihydrate] Anaphylaxis     Noted in 4/7/08 ER     Darvocet [Propoxyphene N-Apap] Itching     Morphine Nausea and Vomiting and Rash     Nalbuphine Hcl Rash     RASH :nubaine     [place] Hives     Zosyn [Piperacillin-Tazobactam In D5w] Rash     Possible allergy, did have a diffuse rash that seemed drug related but could have also been related to soap in the hospital.      Bactrim [Sulfamethoxazole W-Trimethoprim] Other (See Comments) and Nausea and Vomiting     Severely low liver function.     Cats      Compazine [Prochlorperazine] Other (See Comments)     Twitching. Takes Benedryl and is fine     Corticosteroids Other (See Comments)     All oral,IV and injectable steroids are contraindicated (unless in life threatening situations) in Islet Auto transplant recipients. They can cause irreversible loss of islet cell function. Please contact patients transplant care coordinator Mecca Watkins RN BSFRANCHESKA @ [phone]/Pager [phone], and Endocrinologist prior to administration.     Dust Mites      Grass      Prednisone Other (See Comments)     Insomnia       Ragweeds      Tape [Adhesive Tape] Blisters     Trees      Zofran [Ondansetron] Other (See Comments)     migraine     Current Outpatient Prescriptions   Medication Sig Dispense Refill     cetirizine (ZYRTEC) 10 MG tablet Take 10 mg by mouth daily       Lubiprostone 8 MCG CAPS capsule Take 1 capsule (8 mcg) by mouth 2 times daily.  Take 1 capsule by mouth po BID x 5 days.  If no effect, increase to 2 capsules po BID x 5 days.  If no effect, increase to 3 capsules po BID and continue until further  instruction. 90 capsule 3     insulin aspart (NOVOLOG PENFILL) 100 UNIT/ML injection Administer subcutaneously 0.5 unit per 45 grams of carbohydrates. 3 mL 3     Nutritional Supplements (BOOST HIGH PROTEIN) LIQD After above baseline labs are drawn, give: 6 mL/kg to maximum of 360 mL; the beverage is to be consumed within 5 minutes.  0     magnesium hydroxide (MILK OF MAGNESIA) 400 MG/5ML suspension Take 30-60 mLs by mouth daily as needed for constipation. 480 mL 11     LORazepam (ATIVAN) 0.5 MG tablet Take 0.5 tablets (0.25 mg) by mouth every 6 hours as needed for anxiety 20 tablet 0     insulin glargine (LANTUS) 100 UNIT/ML injection Inject 4 units subcutaneously every morning. 3 mL 3     cyclobenzaprine (FLEXERIL) 5 MG tablet Take 5-10 mg by mouth three times per day as needed for muscle spasms. 42 tablet 3     prochlorperazine (COMPAZINE) 5 MG tablet Take 2 tabs by mouth every 6 hours as needed for nausea. 56 tablet 2     amylase-lipase-protease (CREON 24) 38639 UNITS CPEP per EC capsule Take 3-4 capsules by mouth with meals and 1-2 with snacks. Maximum 15 capsules per day. 180 capsule 3     insulin aspart (NOVOLOG PEN) 100 UNIT/ML injection aspart medium correction 1 unit per 50 > 130 every 4 hours    For Pre-Meal  - 179 give 1 unit.   For Pre-Meal  - 230 give 2 units.   For Pre-Meal  - 281 give 3 units.   For Pre-Meal  - 332 give 4 units.  3     amitriptyline (ELAVIL) 10 MG tablet Take 2 tablets (20 mg) by mouth At Bedtime 60 tablet 3     senna-docusate (SENOKOT-S;PERICOLACE) 8.6-50 MG per tablet Take 1 tablet by mouth 2 times daily as needed for constipation 100 tablet      prochlorperazine (COMPAZINE) 5 MG tablet Take two 5 mg tablets by mouth every six hours as needed for nausea. 90 tablet 3     polyethylene glycol (MIRALAX/GLYCOLAX) Packet Take 17 g by mouth daily as needed for constipation 7 packet 11     diphenhydrAMINE (BENADRYL ALLERGY) 25 MG capsule Take 1 capsule (25 mg) by  mouth every 6 hours as needed for itching or allergies 56 capsule 0     levothyroxine (SYNTHROID/LEVOTHROID) 125 MCG tablet Take 1 tablet (125 mcg), by mouth daily before breakfast. 1 tablet 0     docusate sodium (COLACE) 100 MG capsule Take 1 capsule (100 mg) by mouth 2 times daily as needed for constipation, 60 capsule 0     multivitamin CF formula (CHOICEFUL) capsule Take 1 tablet by mouth daily  11     Blood Glucose Monitoring Suppl (CONTOUR NEXT EZ MONITOR) W/DEVICE KIT 1 Device 6 times daily       pregabalin (LYRICA) 75 MG capsule Take 1 capsule (75 mg) by mouth 2 times daily 150 capsule 3     celecoxib (CELEBREX) 100 MG capsule Take 1 capsule (100 mg) by mouth 2 times daily 60 capsule 1     LANsoprazole (PREVACID) 30 MG CR capsule Take 1 capsule (30 mg) by mouth daily Take 30-60 minutes before a meal. 30 capsule 11     aspirin 81 MG EC tablet Take 1 tablet (81 mg) by mouth daily 90 tablet 3     Injection Device for insulin (NOVOPEN ECHO) MARCO 1 each daily as needed 1 each 0     insulin pen needle (Orexo KEN U/F) 32G X 4 MM Use 6-8 times per day 100 each 3     glucose 40 % GEL gel Take 15-30 g by mouth every 15 minutes as needed for low blood sugar 3 Tube 2     Sharps Container (BD SHARPS ) MISC 1 Container as needed 1 each 1     blood glucose monitoring (Nokori CONTOUR NEXT) test strip Use to test blood sugar 6-8 times daily or as directed. 200 strip prn     blood glucose monitoring (PAT MICROLET) lancets Use to test blood sugar 6-8 times daily or as directed. 1 Box prn     oxyCODONE (ROXICODONE) 5 MG IR tablet Take 1-2 tablets (5-10 mg) by mouth every 4 hours as needed for moderate to severe pain (Patient not taking: Reported on 4/10/2017) 150 tablet 0     glucagon (GLUCAGON EMERGENCY) 1 MG kit Inject 1 mg into the muscle once for 1 dose 1 mg 1     Past Medical History:   Diagnosis Date     Allergic rhinitis, cause unspecified      Allergy to other foods     strawberries, apples, celeries, alice,  watermelon     Arthritis     left knee     Choledocholithiasis     long after cholecystectomy     Chronic abdominal pain      Chronic constipation      Chronic nausea      Chronic pancreatitis (H)      Degeneration of lumbar or lumbosacral intervertebral disc      Esophageal reflux     w/ hiatal hernia     Gastroparesis      Hiatal hernia      History of pituitary adenoma     s/p resection     Hypothyroidism      Migraines      Mild hyperlipidemia      On tube feeding diet     presence of GJ tube     Pancreatic disease     PD stricture, 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 endometriosis growth     APPENDECTOMY       C  DELIVERY ONLY       C  DELIVERY ONLY      repeat c section with incidental cystotomy with repair     C EXCIS PITUITARY,TRANSNASAL/SEPTAL      pituitary tumor removed for diabetes insipidus     C TOTAL ABDOM HYSTERECTOMY      w/ bilateral salpingoophorectomy      C WRIST ARTHROSCOP,RELEASE XVERS LIG Bilateral 08      SECTION       COLONOSCOPY       ENDOSCOPIC RETROGRADE CHOLANGIOPANCREATOGRAM N/A 2015    Procedure: ENDOSCOPIC RETROGRADE CHOLANGIOPANCREATOGRAM;  Surgeon: Mandeep Park MD;  [facility]     ENDOSCOPIC RETROGRADE CHOLANGIOPANCREATOGRAM N/A 2016    Procedure: COMBINED ENDOSCOPIC RETROGRADE CHOLANGIOPANCREATOGRAPHY, PLACE TUBE/STENT;  Surgeon: Mandeep Park MD;  Location:  OR     ENDOSCOPIC RETROGRADE CHOLANGIOPANCREATOGRAM N/A 3/17/2016    Procedure: COMBINED ENDOSCOPIC RETROGRADE CHOLANGIOPANCREATOGRAPHY, REMOVE FOREIGN BODY OR STENT/TUBE;  Surgeon: Mandeep Park MD;  Location:  OR     ENDOSCOPIC RETROGRADE CHOLANGIOPANCREATOGRAM N/A 2016     Procedure: COMBINED ENDOSCOPIC RETROGRADE CHOLANGIOPANCREATOGRAPHY, PLACE TUBE/STENT;  Surgeon: Mandeep Park MD;  [facility]     ENDOSCOPIC RETROGRADE CHOLANGIOPANCREATOGRAM N/A 8/26/2016    Procedure: COMBINED ENDOSCOPIC RETROGRADE CHOLANGIOPANCREATOGRAPHY, REMOVE FOREIGN BODY OR STENT/TUBE;  Surgeon: Mandeep Park MD;  [facility]     ENDOSCOPIC ULTRASOUND UPPER GASTROINTESTINAL TRACT (GI) N/A 10/3/2016    Procedure: ENDOSCOPIC ULTRASOUND, ESOPHAGOSCOPY / UPPER GASTROINTESTINAL TRACT (GI);  Surgeon: Guru Joes Rodas MD;  [facility]     ESOPHAGOSCOPY, GASTROSCOPY, DUODENOSCOPY (EGD), COMBINED N/A 6/24/2015    Procedure: COMBINED ESOPHAGOSCOPY, GASTROSCOPY, DUODENOSCOPY (EGD), REMOVE FOREIGN BODY;  Surgeon: Mandeep Park MD;  [facility]     ESOPHAGOSCOPY, GASTROSCOPY, DUODENOSCOPY (EGD), COMBINED N/A 10/25/2015    Procedure: COMBINED ESOPHAGOSCOPY, GASTROSCOPY, DUODENOSCOPY (EGD);  Surgeon: Sammy Amaro MD;  [facility]     ESOPHAGOSCOPY, GASTROSCOPY, DUODENOSCOPY (EGD), COMBINED N/A 10/25/2015    Procedure: COMBINED ESOPHAGOSCOPY, GASTROSCOPY, DUODENOSCOPY (EGD), BIOPSY SINGLE OR MULTIPLE;  Surgeon: Sammy Amaro MD;  [facility]     ESOPHAGOSCOPY, GASTROSCOPY, DUODENOSCOPY (EGD), DILATATION, COMBINED       HC ESOPH/GAS REFLUX TEST W NASAL IMPED >1 HR N/A 11/19/2015    Procedure: ESOPHAGEAL IMPEDENCE FUNCTION TEST WITH 24 HOUR PH GREATER THAN 1 HOUR;  Surgeon: Thiago Apple MD;  [facility]     HC UGI ENDOSCOPY DIAG W BIOPSY  9/17/08     HC UGI ENDOSCOPY DIAG W BIOPSY  9/27/12     HC UGI ENDOSCOPY W ESOPHAGEAL DILATION BALLOON <30MM  9/17/08     HC UGI ENDOSCOPY W EUS N/A 5/5/2015    Procedure: COMBINED ENDOSCOPIC ULTRASOUND, ESOPHAGOSCOPY, GASTROSCOPY, DUODENOSCOPY (EGD);  Surgeon: Wm Dueñas MD;  [facility]     INJECT TRANSVERSUS ABDOMINIS PLANE (TAP) BLOCK BILATERAL Left 9/22/2016    Procedure:  INJECT TRANSVERSUS ABDOMINIS PLANE (TAP) BLOCK BILATERAL;  Surgeon: Dickson Corrigan MD;  [facility]     laparoscopic pineda  1995     LAPAROSCOPIC PANCREATECTOMY, TRANSPLANT AUTO ISLET CELL N/A 12/28/2016    Procedure: LAPAROSCOPIC PANCREATECTOMY, TRANSPLANT AUTO ISLET CELL;  Surgeon: Nestor Phoenix MD;  [facility]     transphenoidal pituitary resection  1990       Key Data Reviewed:  LABS:   Lab Results   Component Value Date    WBC 5.0 04/04/2017    HGB 10.4 (L) 04/04/2017    HCT 33.4 (L) 04/04/2017     (H) 04/04/2017     04/04/2017    POTASSIUM 3.6 04/04/2017    CHLORIDE 107 04/04/2017    CO2 26 04/04/2017    BUN 17 04/04/2017    CR 0.66 04/04/2017     (H) 04/05/2017    SED 11 10/23/2012    DD 1.3 (H) 01/25/2017    TROPI  10/23/2015     <0.015  The 99th percentile for upper reference range is 0.045 ug/L.  Troponin values in   the range of 0.045 - 0.120 ug/L may be associated with risks of adverse   clinical events.      AST 55 (H) 04/04/2017    ALT 88 (H) 04/04/2017    ALKPHOS 438 (H) 04/04/2017    BILITOTAL 1.0 04/04/2017    INR 1.07 04/04/2017     I saw the patient with Dr. Carrion, palliative care fellow and agree with the findings and the plan of care as documented in the  fellow's note.     Total time spent was 20 minutes, and more than 50% of face to face time was spent in counseling and/or coordination of care regarding the above plan.    Sebastian López DO    [facility]  Pain Management  Department of Anesthesiology    SUBACROMIAL BURSA INJECTION    PROCEDURE:  1) LEFT subacromial bursa injections    REASON FOR PROCEDURE:Chronic left shoulder pain secondary to adhesive capsulitis    PHYSICIAN: Sebastian López DO    MEDICATIONS INJECTED: A 3mL solution containing 3mL 0.5% Bupivacaine.  LOCAL ANESTHETIC INJECTED: None    TECHNIQUE: Time-out was taken to identify the correct patient, procedure and side prior to starting the procedure.  The  target site was determined by direct palpation and the left shoulder was prepped with alcohol.  A 25-gauge, 1.5-inch straight needle was then used to advanced to the subacromial space and the above medication was injected after negative aspiration for heme.  She noticed an immediate increase in her range of motion.    The procedure was completed without complications and was tolerated well. The patient was monitored after the procedure. The patient (or responsible party) was given post-procedure and discharge instructions to follow at home. The patient was discharged in stable condition.   A follow-up phone call will be made within 1 week.    Sebastian López DO    [facility]  Pain Management  Department of Anesthesiology

## 2017-04-10 NOTE — LETTER
4/10/2017       RE: Dinora Mcghee  [address]  [facility] [address]     Dear Colleague,    Thank you for referring your patient, Dinora Mcghee, to the [facility] FOR COMPREHENSIVE PAIN MANAGEMENT at [facility]. Please see a copy of my visit note below.    Chronic Pain Management Outpatient Clinic Progress Note    Interval history:  Dinora Mcghee is a 51 year old female last seen by me on 12/7/16.      Recommendations/plan at the last visit included:  1. Physical Therapy: Continue HEP and abdominal exercises  2. Clinical Health Psychologist to address issues of relaxation, behavioral change, coping style, and other factors important to improvement. She is continuing her therapy.  3. Diagnostic Studies: None  4. Medication Management: Refill Oxycodone - will discuss wean after the perioperative period and recovery. Lyrica increased to 50mg BID, continue celebrex. Can increase Tylenol to 2gm daily to alternate with celebrex.  5. Further procedures recommended: None at this time  6. Recommendations to PCP:Recommend inpatient pain management consult if needed to plan opioid dosing schedule on discharge after surgery.  7. Follow up: Early February    Patient Name:  Dinora Mcghee  Primary Provider:  Justyna Lawson    Chief Complaint: follow up, shoulder pain    Interim History:  Dinora Mcghee 52 year old female returns with her  to discuss pain management. She was last seen in pain clinic 12/7/16 prior to total pancreatectomy w/ islet cell transplantation.    She is doing quite well since her surgery. However, she has struggled with a left frozen shoulder since right after surgery. She's currently in PT (now 5th week). Pain and mobility generally improving, but still having restrictions in primarily abduction and internal rotation. She's using ice, heat, tylenol PRN, oxycodone PRN, and flexeril every once and a while (about 4 times per  "week). She's not using any topical creams or ointments.    A foreign body was found next to feeding tube site, and this is planned for removal next Monday. She has occasional pain at this site, otherwise having twinges of abdominal pain at incision sites. Overall, her abdominal pain is minimal. She is also eating by mouth, no tube feeds! She is having a \"weird pattern\" of bowel movements. Will go to the bathroom many times in a row, then no BM for 3 days. Planning to start lubiprostone soon through Dr. Phoenix.    For pain, she's currently taking on Lyrica 75 mg BID and Celebrex 100 mg BID with tylenol 1000 mg PRN (although she says this isn't that helpful). Last opioid use was 2 nights ago (2.5 mg) for frozen shoulder related pain. She's not taking opioids for stomach pain since over 2 weeks ago.    She is taking lorazepam for anxiety and sleep, 0.25mg once or twice per day. She is anxious about her pain, but also about a liver biopsy result. She's waking up a few nights during the week in \"panic\". Her PCP recently rx an SNRI, but she has not started this yet. She is still seeing a therapist and doing EMDR.     She is still on amitriptyline 20 mg (has been on for years for migraines).    Social History:  Pertinent changes to social history/social situation since last visit: Still not working, trying to get \"back into life\". Exploring other job opportunities.    Social History   Substance Use Topics     Smoking status: Former Smoker     Packs/day: 0.50     Years: 6.00     Types: Cigarettes     Start date: 9/1/1985     Quit date: 1/1/1992     Smokeless tobacco: Never Used      Comment: no 2nd hand     Alcohol use No      Comment: last drink 6/2016  - moderate social     Review of Systems:   ROS: 10 point ROS neg other than the symptoms noted above in the HPI.            Physical Exam:   Vitals were reviewed  /62  Pulse 85  Temp 97.9  F (36.6  C) (Oral)  Ht 1.727 m (5' 7.99\")  Wt 68.3 kg (150 lb 9.6 oz)  SpO2 " 98%  BMI 22.9 kg/m2  General: Alert, comfortable appearing female in no acute distress.   Eyes: Pupils equal, sclera clear.   ENT: MMM. No ulcerations or plaques.   Cardiac: Normal rate, well perfused  Resp: Unlabored on room air.   Abd: Soft, non-distended, nontender multiple incisional scars   Ext: Left shoulder- grossly normal appearing, limited passive and active abduction, internal rotation, and flexion. +mild tenderness palpated over AC joint.   Neuro: No facial asymmetry.  Spontaneous movements grossly non-focal.  Normal gait.   Neuropsych: Alert, appropriately interactive, full affect.       Impression & Recommendations & Counselin.) Chronic Abdominal Pain  2.) Chronic Biliary Pancreatitis s/p TP-IATT 2016  3.) Left shoulder pain 2/2 adhesive capsulitis   4.) Anxiety and insomnia     Dinora Mcghee is a 51 year old female who is here for pain management s/p TP-IAT in Dec 2016. She is overall doing very well from her surgery and has minimal abdominal pain. She is, however, having ongoing pain and limited mobility in her left shoulder due to adhesive capsulitis, which is slowly improving. She is also struggling with anxiety and insomnia. Plan today is as follows:    8. Physical Therapy: Continue therapy for shoulder.   9. Clinical Health Psychologist to address issues of relaxation, behavioral change, coping style, and other factors important to improvement. She is continuing her therapy, using EMDR.  10. Diagnostic Studies: None.  11. Medication Management: Refill for Lorazepam 0.5mg q 6 PRN primarily for sleep anxiety (20 tabs provided)- instructed to talk with PCP about SNRI use and long term anxiety management. Instructed to not use opioids while taking lorazepam. Will continue Lyrica, Celebrex, tylenol PRN. Also, instructed to keep using heat and ice.  We discussed that her Lorazepam usage is a temporary therapy until her shoulder improves and her liver work-up is complete - which are the  main contributors to her anxiety.  Overall long term management of anxiety would be best achieved by therapy and her SNRI.    12. Procedures: Left Shoulder SA bursa injection today with local only - will avoid steroids in this transplant patient.  13. Recommendations to PCP: Anxiety and insomnia follow up.  14. Follow up: 2-3 months    Patient seen and examined by Dr. Marshal SOLER  Hospice and Palliative Medicine Fellow  [facility]          Additional information reviewed today:   Allergies   Allergen Reactions     Apple Anaphylaxis     Depakote [Divalproex Sodium] Other (See Comments)     Chest pain     Zithromax [Azithromycin Dihydrate] Anaphylaxis     Noted in 4/7/08 ER     Darvocet [Propoxyphene N-Apap] Itching     Morphine Nausea and Vomiting and Rash     Nalbuphine Hcl Rash     RASH :nubaine     [place] Hives     Zosyn [Piperacillin-Tazobactam In D5w] Rash     Possible allergy, did have a diffuse rash that seemed drug related but could have also been related to soap in the hospital.      Bactrim [Sulfamethoxazole W-Trimethoprim] Other (See Comments) and Nausea and Vomiting     Severely low liver function.     Cats      Compazine [Prochlorperazine] Other (See Comments)     Twitching. Takes Benedryl and is fine     Corticosteroids Other (See Comments)     All oral,IV and injectable steroids are contraindicated (unless in life threatening situations) in Islet Auto transplant recipients. They can cause irreversible loss of islet cell function. Please contact patients transplant care coordinator Mecca ANGEL @ [phone]/Pager [phone], and Endocrinologist prior to administration.     Dust Mites      Grass      Prednisone Other (See Comments)     Insomnia       Ragweeds      Tape [Adhesive Tape] Blisters     Trees      Zofran [Ondansetron] Other (See Comments)     migraine     Current Outpatient Prescriptions   Medication Sig Dispense Refill     cetirizine  (ZYRTEC) 10 MG tablet Take 10 mg by mouth daily       Lubiprostone 8 MCG CAPS capsule Take 1 capsule (8 mcg) by mouth 2 times daily.  Take 1 capsule by mouth po BID x 5 days.  If no effect, increase to 2 capsules po BID x 5 days.  If no effect, increase to 3 capsules po BID and continue until further instruction. 90 capsule 3     insulin aspart (NOVOLOG PENFILL) 100 UNIT/ML injection Administer subcutaneously 0.5 unit per 45 grams of carbohydrates. 3 mL 3     Nutritional Supplements (BOOST HIGH PROTEIN) LIQD After above baseline labs are drawn, give: 6 mL/kg to maximum of 360 mL; the beverage is to be consumed within 5 minutes.  0     magnesium hydroxide (MILK OF MAGNESIA) 400 MG/5ML suspension Take 30-60 mLs by mouth daily as needed for constipation. 480 mL 11     LORazepam (ATIVAN) 0.5 MG tablet Take 0.5 tablets (0.25 mg) by mouth every 6 hours as needed for anxiety 20 tablet 0     insulin glargine (LANTUS) 100 UNIT/ML injection Inject 4 units subcutaneously every morning. 3 mL 3     cyclobenzaprine (FLEXERIL) 5 MG tablet Take 5-10 mg by mouth three times per day as needed for muscle spasms. 42 tablet 3     prochlorperazine (COMPAZINE) 5 MG tablet Take 2 tabs by mouth every 6 hours as needed for nausea. 56 tablet 2     amylase-lipase-protease (CREON 24) 47870 UNITS CPEP per EC capsule Take 3-4 capsules by mouth with meals and 1-2 with snacks. Maximum 15 capsules per day. 180 capsule 3     insulin aspart (NOVOLOG PEN) 100 UNIT/ML injection aspart medium correction 1 unit per 50 > 130 every 4 hours    For Pre-Meal  - 179 give 1 unit.   For Pre-Meal  - 230 give 2 units.   For Pre-Meal  - 281 give 3 units.   For Pre-Meal  - 332 give 4 units.  3     amitriptyline (ELAVIL) 10 MG tablet Take 2 tablets (20 mg) by mouth At Bedtime 60 tablet 3     senna-docusate (SENOKOT-S;PERICOLACE) 8.6-50 MG per tablet Take 1 tablet by mouth 2 times daily as needed for constipation 100 tablet      prochlorperazine  (COMPAZINE) 5 MG tablet Take two 5 mg tablets by mouth every six hours as needed for nausea. 90 tablet 3     polyethylene glycol (MIRALAX/GLYCOLAX) Packet Take 17 g by mouth daily as needed for constipation 7 packet 11     diphenhydrAMINE (BENADRYL ALLERGY) 25 MG capsule Take 1 capsule (25 mg) by mouth every 6 hours as needed for itching or allergies 56 capsule 0     levothyroxine (SYNTHROID/LEVOTHROID) 125 MCG tablet Take 1 tablet (125 mcg), by mouth daily before breakfast. 1 tablet 0     docusate sodium (COLACE) 100 MG capsule Take 1 capsule (100 mg) by mouth 2 times daily as needed for constipation, 60 capsule 0     multivitamin CF formula (CHOICEFUL) capsule Take 1 tablet by mouth daily  11     Blood Glucose Monitoring Suppl (CONTOUR NEXT EZ MONITOR) W/DEVICE KIT 1 Device 6 times daily       pregabalin (LYRICA) 75 MG capsule Take 1 capsule (75 mg) by mouth 2 times daily 150 capsule 3     celecoxib (CELEBREX) 100 MG capsule Take 1 capsule (100 mg) by mouth 2 times daily 60 capsule 1     LANsoprazole (PREVACID) 30 MG CR capsule Take 1 capsule (30 mg) by mouth daily Take 30-60 minutes before a meal. 30 capsule 11     aspirin 81 MG EC tablet Take 1 tablet (81 mg) by mouth daily 90 tablet 3     Injection Device for insulin (NOVOPEN ECHO) MARCO 1 each daily as needed 1 each 0     insulin pen needle (Congo KEN U/F) 32G X 4 MM Use 6-8 times per day 100 each 3     glucose 40 % GEL gel Take 15-30 g by mouth every 15 minutes as needed for low blood sugar 3 Tube 2     Sharps Container (BD SHARPS ) MISC 1 Container as needed 1 each 1     blood glucose monitoring (Bench CONTOUR NEXT) test strip Use to test blood sugar 6-8 times daily or as directed. 200 strip prn     blood glucose monitoring (PAT MICROLET) lancets Use to test blood sugar 6-8 times daily or as directed. 1 Box prn     oxyCODONE (ROXICODONE) 5 MG IR tablet Take 1-2 tablets (5-10 mg) by mouth every 4 hours as needed for moderate to severe pain (Patient  not taking: Reported on 4/10/2017) 150 tablet 0     glucagon (GLUCAGON EMERGENCY) 1 MG kit Inject 1 mg into the muscle once for 1 dose 1 mg 1     Past Medical History:   Diagnosis Date     Allergic rhinitis, cause unspecified      Allergy to other foods     strawberries, apples, celeries, alice, watermelon     Arthritis     left knee     Choledocholithiasis     long after cholecystectomy     Chronic abdominal pain      Chronic constipation      Chronic nausea      Chronic pancreatitis (H)      Degeneration of lumbar or lumbosacral intervertebral disc      Esophageal reflux     w/ hiatal hernia     Gastroparesis      Hiatal hernia      History of pituitary adenoma     s/p resection     Hypothyroidism      Migraines      Mild hyperlipidemia      On tube feeding diet     presence of GJ tube     Pancreatic disease     PD stricture, 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 endometriosis growth     APPENDECTOMY       C  DELIVERY ONLY       C  DELIVERY ONLY      repeat c section with incidental cystotomy with repair     C EXCIS PITUITARY,TRANSNASAL/SEPTAL      pituitary tumor removed for diabetes insipidus     C TOTAL ABDOM HYSTERECTOMY      w/ bilateral salpingoophorectomy      C WRIST ARTHROSCOP,RELEASE XVERS LIG Bilateral 08      SECTION       COLONOSCOPY       ENDOSCOPIC RETROGRADE CHOLANGIOPANCREATOGRAM N/A 2015    Procedure: ENDOSCOPIC RETROGRADE CHOLANGIOPANCREATOGRAM;  Surgeon: Mandeep Park MD;  Location:  OR     ENDOSCOPIC RETROGRADE CHOLANGIOPANCREATOGRAM N/A 2016    Procedure: COMBINED ENDOSCOPIC RETROGRADE CHOLANGIOPANCREATOGRAPHY, PLACE TUBE/STENT;  Surgeon: Mandeep Park MD;  Location:  [facility] OR     ENDOSCOPIC RETROGRADE CHOLANGIOPANCREATOGRAM N/A 3/17/2016    Procedure: COMBINED ENDOSCOPIC RETROGRADE CHOLANGIOPANCREATOGRAPHY, REMOVE FOREIGN BODY OR STENT/TUBE;  Surgeon: Mandeep Park MD;  Location: [facility] OR     ENDOSCOPIC RETROGRADE CHOLANGIOPANCREATOGRAM N/A 8/2/2016    Procedure: COMBINED ENDOSCOPIC RETROGRADE CHOLANGIOPANCREATOGRAPHY, PLACE TUBE/STENT;  Surgeon: Mandeep Park MD;  Location: [facility] OR     ENDOSCOPIC RETROGRADE CHOLANGIOPANCREATOGRAM N/A 8/26/2016    Procedure: COMBINED ENDOSCOPIC RETROGRADE CHOLANGIOPANCREATOGRAPHY, REMOVE FOREIGN BODY OR STENT/TUBE;  Surgeon: Mandeep Park MD;  [facility]: [facility] OR     ENDOSCOPIC ULTRASOUND UPPER GASTROINTESTINAL TRACT (GI) N/A 10/3/2016    Procedure: ENDOSCOPIC ULTRASOUND, ESOPHAGOSCOPY / UPPER GASTROINTESTINAL TRACT (GI);  Surgeon: Guru Jose Rodas MD;  Location: [facility] OR     ESOPHAGOSCOPY, GASTROSCOPY, DUODENOSCOPY (EGD), COMBINED N/A 6/24/2015    Procedure: COMBINED ESOPHAGOSCOPY, GASTROSCOPY, DUODENOSCOPY (EGD), REMOVE FOREIGN BODY;  Surgeon: Mandeep Park MD;  Location: [facility] GI     ESOPHAGOSCOPY, GASTROSCOPY, DUODENOSCOPY (EGD), COMBINED N/A 10/25/2015    Procedure: COMBINED ESOPHAGOSCOPY, GASTROSCOPY, DUODENOSCOPY (EGD);  Surgeon: Sammy Amaro MD;  [facility]: [facility] GI     ESOPHAGOSCOPY, GASTROSCOPY, DUODENOSCOPY (EGD), COMBINED N/A 10/25/2015    Procedure: COMBINED ESOPHAGOSCOPY, GASTROSCOPY, DUODENOSCOPY (EGD), BIOPSY SINGLE OR MULTIPLE;  Surgeon: Sammy Amaro MD;  Location: [facility] GI     ESOPHAGOSCOPY, GASTROSCOPY, DUODENOSCOPY (EGD), DILATATION, COMBINED       HC ESOPH/GAS REFLUX TEST W NASAL IMPED >1 HR N/A 11/19/2015    Procedure: ESOPHAGEAL IMPEDENCE FUNCTION TEST WITH 24 HOUR PH GREATER THAN 1 HOUR;  Surgeon: Thiago Apple MD;  Location: [facility] GI     HC UGI ENDOSCOPY DIAG W BIOPSY  9/17/08     HC UGI ENDOSCOPY DIAG W BIOPSY  9/27/12     HC UGI ENDOSCOPY W ESOPHAGEAL DILATION  BALLOON <30MM  9/17/08     HC UGI ENDOSCOPY W EUS N/A 5/5/2015    Procedure: COMBINED ENDOSCOPIC ULTRASOUND, ESOPHAGOSCOPY, GASTROSCOPY, DUODENOSCOPY (EGD);  Surgeon: Wm Dueñas MD;  [facility] GI     INJECT TRANSVERSUS ABDOMINIS PLANE (TAP) BLOCK BILATERAL Left 9/22/2016    Procedure: INJECT TRANSVERSUS ABDOMINIS PLANE (TAP) BLOCK BILATERAL;  Surgeon: Dickson Corrigan MD;  [facility]: [facility] OR     laparoscopic pineda  1995     LAPAROSCOPIC PANCREATECTOMY, TRANSPLANT AUTO ISLET CELL N/A 12/28/2016    Procedure: LAPAROSCOPIC PANCREATECTOMY, TRANSPLANT AUTO ISLET CELL;  Surgeon: Nestor Phoenix MD;  Location: [facility] OR     transphenoidal pituitary resection  1990       Key Data Reviewed:  LABS:   Lab Results   Component Value Date    WBC 5.0 04/04/2017    HGB 10.4 (L) 04/04/2017    HCT 33.4 (L) 04/04/2017     (H) 04/04/2017     04/04/2017    POTASSIUM 3.6 04/04/2017    CHLORIDE 107 04/04/2017    CO2 26 04/04/2017    BUN 17 04/04/2017    CR 0.66 04/04/2017     (H) 04/05/2017    SED 11 10/23/2012    DD 1.3 (H) 01/25/2017    TROPI  10/23/2015     <0.015  The 99th percentile for upper reference range is 0.045 ug/L.  Troponin values in   the range of 0.045 - 0.120 ug/L may be associated with risks of adverse   clinical events.      AST 55 (H) 04/04/2017    ALT 88 (H) 04/04/2017    ALKPHOS 438 (H) 04/04/2017    BILITOTAL 1.0 04/04/2017    INR 1.07 04/04/2017     I saw the patient with Dr. Carrion, palliative care fellow and agree with the findings and the plan of care as documented in the  fellow's note.     Total time spent was 20 minutes, and more than 50% of face to face time was spent in counseling and/or coordination of care regarding the above plan.    Sebastian López DO    [facility]  Pain Management  Department of Anesthesiology    SUBACROMIAL BURSA INJECTION    PROCEDURE:  1) LEFT subacromial bursa injections    REASON FOR PROCEDURE:Chronic left shoulder pain  secondary to adhesive capsulitis    PHYSICIAN: Sebastian López DO    MEDICATIONS INJECTED: A 3mL solution containing 3mL 0.5% Bupivacaine.  LOCAL ANESTHETIC INJECTED: None    TECHNIQUE: Time-out was taken to identify the correct patient, procedure and side prior to starting the procedure.  The target site was determined by direct palpation and the left shoulder was prepped with alcohol.  A 25-gauge, 1.5-inch straight needle was then used to advanced to the subacromial space and the above medication was injected after negative aspiration for heme.  She noticed an immediate increase in her range of motion.    The procedure was completed without complications and was tolerated well. The patient was monitored after the procedure. The patient (or responsible party) was given post-procedure and discharge instructions to follow at home. The patient was discharged in stable condition.   A follow-up phone call will be made within 1 week.    Sebastian López DO    [facility]  Pain Management  Department of Anesthesiology

## 2017-04-11 LAB
ALBUMIN SERPL ELPH-MCNC: 4.2 G/DL (ref 3.7–5.1)
ALPHA1 GLOB SERPL ELPH-MCNC: 0.3 G/DL (ref 0.2–0.4)
ALPHA2 GLOB SERPL ELPH-MCNC: 0.7 G/DL (ref 0.5–0.9)
B-GLOBULIN SERPL ELPH-MCNC: 1 G/DL (ref 0.6–1)
GAMMA GLOB SERPL ELPH-MCNC: 1.1 G/DL (ref 0.7–1.6)
M PROTEIN SERPL ELPH-MCNC: 0 G/DL
PROT PATTERN SERPL ELPH-IMP: NORMAL
RESULT: NORMAL
SEND OUTS MISC TEST CODE: NORMAL
SEND OUTS MISC TEST SPECIMEN: NORMAL
TEST NAME: NORMAL

## 2017-04-12 ENCOUNTER — TRANSFERRED RECORDS (OUTPATIENT)
Dept: HEALTH INFORMATION MANAGEMENT | Facility: CLINIC | Age: 52
End: 2017-04-12

## 2017-04-12 LAB — NUCLEAR IGG TITR SER IF: NORMAL {TITER}

## 2017-04-13 ENCOUNTER — TELEPHONE (OUTPATIENT)
Dept: TRANSPLANT | Facility: CLINIC | Age: 52
End: 2017-04-13

## 2017-04-13 LAB
ENA SS-A IGG SER IA-ACNC: NORMAL AI (ref 0–0.9)
ENA SS-B IGG SER IA-ACNC: NORMAL AI (ref 0–0.9)

## 2017-04-13 NOTE — TELEPHONE ENCOUNTER
Sent e-mail to Dinora with her 4/25/17 2:40 PM appointment with Dr. Phoenix for follow up post SDS procedure 4/17/17.

## 2017-04-13 NOTE — TELEPHONE ENCOUNTER
Left [facility] and contact information for Dinora to return my call. Wanting to see how she is doing.

## 2017-04-13 NOTE — TELEPHONE ENCOUNTER
Dinora returned my call. She just returned from having PT on her shoulder. She says it helps a lot and that the lidocaine injection done by Dr. Bustos helped for a few days. Her major concern is the pain in her abdomen where the foreign body is located. She reports not being able to get comfortable at night due to this area and her shoulder. She also shared that her anxiety medications were increased by Dr. Bustos and that she is going to resume counseling with Dr. Adballa as she is having difficulty keeping her anxiety under control with the above mentioned and the autoimmune/ liver/rheumatology, and testing as to what is really wrong with her. She notes poor sleep most nights. She also has completed her 3rd Kyrgyz.   We discussed the addition of the Amitiza to her medications. She is still on the 8 mcg/day BID. She stated that the morning after her first dose the night before, that she woke up and had a huge bowel movement. Soft/ formed, with a little loose consistency.   This RN did review Dinora's Monday surgery scheduled at 1:00PM at the [facility] with Dr. Phoenix to remove the foreign body. Explained that she should be receiving a call today or tomorrow from the [facility] SDS unit about preparations for Monday. Explained to be prepared to arrive 2 hours prior and to be NPO, but the SDS will provide their expectations. Her PCP has held her Celebrex until after surgery. Told Dinora not to take any Ibuprofen either.  Dinora is asking when she will receive a call from Rheumatology. This RN directed her back to Dr. Lugo's team to ask about when she will be receiving a call to schedule an appointment.    Dinora verbalized understanding and had no further questions.

## 2017-04-14 ENCOUNTER — TELEPHONE (OUTPATIENT)
Dept: RHEUMATOLOGY | Facility: CLINIC | Age: 52
End: 2017-04-14

## 2017-04-14 NOTE — TELEPHONE ENCOUNTER
----- Message from Anita Becerra MD sent at 4/11/2017 12:36 PM CDT -----  Juan Jose, this is a good pt for fellows' clinic, could you schedule her with fellows on the day i staff? She has IgG-4 disease and there is concern for sjogren's.    Thank you  ----- Message -----     From: Ara Lugo MD     Sent: 4/10/2017   7:38 PM       To: Anita Becerra MD    This is the patient I told you about today in clinic with dry eyes and dry mouth and question of  IgG 4 disease. Thanks for seeing her.

## 2017-04-14 NOTE — TELEPHONE ENCOUNTER
Contacted pt and offered appointment with Dr Richardson on 5/12/17 at 7:30 am, which was accepted. Pt saw a rheumatologist ~30 yrs ago as she had been dx with Fibromyalgia, none since. Her PCP is at the [facility] in [facility]. It appears some notes have been scanned into EMR.    Staff message sent to the pool asking for assistance in scheduling this appointment.    MEDARDO ArmstrongN RN  Rheumatology RN Coordinator   [place]

## 2017-04-17 ENCOUNTER — TELEPHONE (OUTPATIENT)
Dept: TRANSPLANT | Facility: CLINIC | Age: 52
End: 2017-04-17

## 2017-04-17 ENCOUNTER — SURGERY (OUTPATIENT)
Age: 52
End: 2017-04-17

## 2017-04-17 ENCOUNTER — HOSPITAL ENCOUNTER (OUTPATIENT)
Facility: AMBULATORY SURGERY CENTER | Age: 52
End: 2017-04-17
Attending: SURGERY

## 2017-04-17 VITALS
HEIGHT: 68 IN | SYSTOLIC BLOOD PRESSURE: 107 MMHG | OXYGEN SATURATION: 100 % | BODY MASS INDEX: 22.82 KG/M2 | RESPIRATION RATE: 16 BRPM | TEMPERATURE: 97.4 F | DIASTOLIC BLOOD PRESSURE: 67 MMHG | WEIGHT: 150.6 LBS

## 2017-04-17 DIAGNOSIS — S31.131A: Primary | ICD-10-CM

## 2017-04-17 DIAGNOSIS — K85.10 ACUTE BILIARY PANCREATITIS WITHOUT INFECTION OR NECROSIS: ICD-10-CM

## 2017-04-17 LAB — CRYOGLOB SER QL: NORMAL %

## 2017-04-17 RX ORDER — BUPIVACAINE HYDROCHLORIDE 2.5 MG/ML
INJECTION, SOLUTION INFILTRATION; PERINEURAL PRN
Status: DISCONTINUED | OUTPATIENT
Start: 2017-04-17 | End: 2017-04-17 | Stop reason: HOSPADM

## 2017-04-17 RX ORDER — LIDOCAINE HYDROCHLORIDE 10 MG/ML
INJECTION, SOLUTION EPIDURAL; INFILTRATION; INTRACAUDAL; PERINEURAL PRN
Status: DISCONTINUED | OUTPATIENT
Start: 2017-04-17 | End: 2017-04-17 | Stop reason: HOSPADM

## 2017-04-17 RX ORDER — CLINDAMYCIN PHOSPHATE 900 MG/50ML
900 INJECTION, SOLUTION INTRAVENOUS EVERY 8 HOURS
Status: DISCONTINUED | OUTPATIENT
Start: 2017-04-17 | End: 2017-04-18 | Stop reason: HOSPADM

## 2017-04-17 RX ADMIN — LIDOCAINE HYDROCHLORIDE 15 ML: 10 INJECTION, SOLUTION EPIDURAL; INFILTRATION; INTRACAUDAL; PERINEURAL at 13:44

## 2017-04-17 RX ADMIN — BUPIVACAINE HYDROCHLORIDE 15 ML: 2.5 INJECTION, SOLUTION INFILTRATION; PERINEURAL at 13:43

## 2017-04-17 NOTE — IP AVS SNAPSHOT
[facility]    [address] [place] [id number]    Phone:  [phone]    Fax:  [phone]                                       After Visit Summary   4/17/2017    Dinora Mcghee    MRN: [id number]           After Visit Summary Signature Page     I have received my discharge instructions, and my questions have been answered. I have discussed any challenges I see with this plan with the nurse or doctor.    ..........................................................................................................................................  Patient/Patient Representative Signature      ..........................................................................................................................................  Patient Representative Print Name and Relationship to Patient    ..................................................               ................................................  Date                                            Time    ..........................................................................................................................................  Reviewed by Signature/Title    ...................................................              ..............................................  Date                                                            Time

## 2017-04-17 NOTE — TELEPHONE ENCOUNTER
Spoke with Dinora about her procedure today. She has been NPO since last night, has taken her 4 units of Lantus, Levothyroxine, PPI, and Amitiza this morning. She has taken her two showers with dial soap last night and this morning. She is concerned that she never received a call about when to arrive today so she decided to arrive at 10:00. She is concerned about nausea with anesthesia and wants to wear a scopolamine patch. This RN told her that was fine. This RN did tell Dinora that she is scheduled for local anesthesia and that most likely she would not receive any IV sedation or require anesthesia care. She is appropriately NPO.

## 2017-04-17 NOTE — TELEPHONE ENCOUNTER
Food  Food Grams Carbs (g) Date/Time Fiber (g) Sugar (g) Notes   chips-multi grain scoops & tomatillo salsa, 2 T peanut butter, Domo crackers 350 129 Apr 13, 2017 10:30:00 PM 10 0    salad, rice, chicken, veggies 500 50 Apr 13, 2017 6:07:37 PM 0 0    egg salad sandwich & multi grain chips 260 0 Apr 13, 2017 12:58:42 PM 0 0    Greek yogurt 1/2 cup, krystyna seeds, flax & pumpkin seeds, blueberries  320 0 Apr 13, 2017 9:05:48 AM 0 0    granola, then scoops & cheese & topanade  350 30 Apr 13, 2017 2:26:57 AM 0 0 Covered   tomato basil soup 1 cup and buster bar 750 85 Apr 12, 2017 8:01:28 PM 0 0    sugar free popsicle & sourdough toast  195 40 Apr 12, 2017 5:22:23 PM 0 0    salad - culvers 420 0 Apr 12, 2017 12:30:00 PM 0 0    Greek yogurt 1/2 cup, krystyna seeds, flax & pumpkin seeds, blueberries  320 0 Apr 12, 2017 10:15:00 AM 0 0    popcorn 6 cups 300 48 Apr 11, 2017 8:42:47 PM 0 0 Covered   salad, coconut shrimp, rice 450 0 Apr 11, 2017 7:00:42 PM 0 0    protein bar 125 40 Apr 11, 2017 3:44:36 PM 0 0    Greek yogurt 1/2 cup, 1 mandarins orange, 1T & krystyna seeds. pistachio 7 310 40 Apr 11, 2017 11:11:34 AM 7 0    eggs, hardboiled with toast 2 slices ramos 380 0 Apr 11, 2017 8:55:12 AM 0 0    salad 250 0 Apr 10, 2017 8:08:12 PM 0 0    triscuit w/cheddar, peppers & olive toponad  200 164 Apr 10, 2017 5:19:55 PM 0 0    salad - culvers 420 0 Apr 10, 2017 12:58:43 PM 0 0    Medication  Name Dosage Unit Notes Date/Time   Lantus 4.0 [id number]  Apr 14, 2017 7:41:00 AM   Novalog 1.0 [id number] Correction Apr 14, 2017 3:15:00 AM   Novalog 0.5 [id number] Coverage Apr 13, 2017 10:45:00 PM   Novalog 0.5 [id number]  Apr 13, 2017 6:07:29 PM   Lantus 4.0 [id number]  Apr 13, 2017 8:30:00 AM   Novalog 0.5 [id number]  Apr 13, 2017 2:28:05 AM   Novalog 1.0 [id number] Voverage Apr 12, 2017 8:01:09 PM   Lantus 4.0 [id number]  Apr 12, 2017 10:40:00 AM   Novalog 0.5 [id number] Coverage  Apr 11, 2017 8:43:19 PM   Lantus 4.0 [id number]  Apr 11,  2017 8:15:57 AM   Novalog 1.5 [id number]  Apr 10, 2017 9:10:00 PM   Blood Sugar  Concentration Event Notes Date/Time   98 mg/dL BeforeBreakfast  Apr 14, 2017 7:32:00 AM   148 mg/dL Fasting Woke with migraine, nauseated & sweating  Apr 14, 2017 3:11:00 AM   111 mg/dL BeforeSleep  Apr 13, 2017 11:13:13 PM   110 mg/dL BeforeDinner  Apr 13, 2017 6:06:00 PM   111 mg/dL BeforeLunch  Apr 13, 2017 12:38:00 PM   109 mg/dL AfterSleep  Apr 13, 2017 8:20:00 AM   100 mg/dL BeforeSleep  Apr 12, 2017 10:07:04 PM   110 mg/dL BeforeDinner Apr 12, 2017 6:45:00 PM   90 mg/dL Other After exercize  Apr 12, 2017 5:05:00 PM   101 mg/dL BeforeLunch  Apr 12, 2017 12:28:00 PM   83 mg/dL BeforeBreakfast  Apr 12, 2017 10:40:00 AM   103 mg/dL AfterSleep  Apr 12, 2017 5:50:00 AM   120 mg/dL BeforeSleep  Apr 11, 2017 8:42:33 PM   108 mg/dL AfterDinner  Apr 11, 2017 7:00:20 PM   102 mg/dL AfterLunch Pre snack Apr 11, 2017 3:43:41 PM   120 mg/dL BeforeLunch  Apr 11, 2017 11:05:52 AM   123 mg/dL AfterSleep  Apr 11, 2017 8:07:00 AM   147 mg/dL BeforeSleep  Apr 10, 2017 10:27:00 PM   107 mg/dL BeforeDinner  Apr 10, 2017 8:07:57 PM   97 mg/dL Other Recheck pre-exercise Apr 10, 2017 5:22:09 PM   80 mg/dL Other Before exercise. Ate a snack to boost it up. Apr 10, 2017 4:59:57 PM   118 mg/dL BeforeLunch  Apr 10, 2017 12:57:34 PM   Weight  Weight Notes Date/Time   148 lb  Apr 11, 2017 7:45:00 AM

## 2017-04-17 NOTE — DISCHARGE INSTRUCTIONS
Caring for Your Incision    You ll need to care for your incision after surgery and certain medical procedures. To close an incision, your doctor used sutures (stitches), steri-strips, staples, or dermabond. Follow the tips on this sheet to help heal and prevent infection of your incision.   Types of Incision Closure:    Surgical Sutures (stitches) are placed by sewing the edges of an incision together with surgical thread. Sutures are either absorbable or non-absorbable. Absorbable sutures break down in the body over time. Non-absorbable sutures need to be removed.     Steri-strips are made of adhesive (sticky) material to help hold the edges of an incision together. Steri-strips usually fall off by themselves in 7 to 10 days.     Surgical Staples are made or steel or titanium. They are often used to close shallow incisions. They are not used on certain body areas, such as the face and hands. This is because these areas have nerves that are close to the surface. Staples are usually removed within a week.     Dermabond (skin glue) is used to close a cut or small incision. The skin glue is less painful than stitches (sutures). In some cases, a lower layer of skin may be sutured before Dermabond is applied. The skin glue closes the cut/incision within a few minutes. It also provides a water-resistant covering. No bandage is required. Dermabond peels off on its own within 5 to 10 days.  Home Care for Your  Incision:    Keep the incision clean and dry. You should bathe only as directed by the doctor. It is okay to wash around the incision, but don t spray water directly on it.     Check the incision site daily for pain, redness, drainage, swelling, or separation of the incision edges.     If you have a dressing over the incision, change the dressing as directed by the doctor.    Make sure any clothing that touches the incision is loose fitting. This will prevent rubbing. If the incision is on the head, avoid wearing  caps or other head coverings. These may rub against the incision.    Avoid rough play, contact sports, or physical activities. This can put you at risk of opening an incision.     As your incision heals, the skin may appear pink or red. It may also feel slightly bumpy or raised. This is called a healing ridge. Over time, the color should fade and the raised skin will become less noticeable.   Call the doctor right away if you have any of the following:    Increased pain, redness, drainage, swelling or bleeding at the incision site    Numbness, coldness or tingling around the incision site  Fever of 101 F degrees or higher

## 2017-04-17 NOTE — OP NOTE
Transplant Surgery  Operative Note    Preop Dx:  Foreign body, left abdominal wall   Postop Dx: same  Procedure: Excision of left abdominal wall foreign body. Intraoperative ultrasound.  Surgeon: Nestor Phoenix M.D., M.S.  Assistant: NATALY Musa.  Anesthesia: Local  EBL: 1 ml  Fluids: none  UO: not measured  Drains: none  Specimen: subcutaneous tissue with small metal foreign body.  Complications: None  Findings:  Small tubular metal object, c/w hardware associated with percutaneous G tube placement.  Indication: The patient has persistent pain with apparent foreign body in left upper abdomenal wall subcutaneous tissue corresponding with 'nodule' and point tenderness.  After discussing the risks and benefits of surgery and potential complications, the patient provided informed consent.     DETAILS OF PROCEDURE:  The patient was brought to the operating room, placed in a supine position. The left upperabdomen was prepped and draped in the usual sterile fashion.  Time out was performed. Local anesthetic field block with 1% plain lido and 0.25% marcaine 50:50, total 30 ml was given via a skin wheal in the left upper quadrant at the site of her previous GT scar.      An elliptical incision was made around the old G tube scar.  The subcutaneous space was explored and some suspicious subcutaneous fat and scar tissue was excised, with no recovery of the foreign body. I then used intraoperative ultrasound to locate the mass to the left margin of the operative site.  The incision was extended laterally and the foreign body was excised with associated fat.  Hemostasis was obtained and the wound closed in layers after a final application of subfascial local anesthetic was applied.  Dermabond was used for wound dressing.    All needle and sponge counts were correct.  The patient tolerated the procedure well and was transferred to the recovery room for further care.    All needle, sponge, and instrument counts were  accurate.  The patient tolerated the procedure well without apparent complications and was trasfered to the PACU in good condition.  Faculty was present for critical portions of the procedure.

## 2017-04-17 NOTE — IP AVS SNAPSHOT
MRN:[id number]                      After Visit Summary   4/17/2017    Dinora Mcghee    MRN: [id number]           Thank you!     Thank you for choosing [facility] for your care. Our goal is always to provide you with excellent care. Hearing back from our patients is one way we can continue to improve our services. Please take a few minutes to complete the written survey that you may receive in the mail after you visit with us. Thank you!        Patient Information     Date Of Birth          1965        About your hospital stay     You were admitted on:  April 17, 2017 You last received care in the:  [facility]    You were discharged on:  April 17, 2017       Who to Call     For medical emergencies, please call 911.  For non-urgent questions about your medical care, please call your primary care provider or clinic, [phone]  For questions related to your surgery, please call your surgery clinic        Attending Provider     Provider Specialty    Nestor Phoenix MD Transplant       Primary Care Provider Office Phone # Fax #    Justyna Lawson [phone]       [facility] [address]  [facility] [address]        Your next 10 appointments already scheduled     Apr 20, 2017  3:00 PM CDT   (Arrive by 2:45 PM)   Return Visit with Maria Elena Abdalla, PhD   [facility] ([facility])    [address] [place] [address]  4th [facility] [id number] [phone]            Apr 22, 2017  8:30 AM CDT   (Arrive by 8:15 AM)   MR ABDOMEN MRCP W/O & W CONTRAST with [id number]   [facility] ([facility])    [address] [place] [id number] [phone]           Take your medicines as usual, unless your doctor tells you not to. Bring a list of your current medicines to your exam (including vitamins, minerals and  over-the-counter drugs). Also bring the results of similar scans you may have had.    The day before your exam, drink extra fluids at least six 8-ounce glasses (unless your doctor tells you to restrict your fluids).   Have a blood test (creatinine test) within 30 days of your exam. Go to your clinic or Diagnostic Imaging Department for this test.   Do not eat or drink for 6 hours prior to exam.  The MRI machine uses a strong magnet. Please wear clothes without metal (snaps, zippers). A sweatsuit works well, or we may give you a hospital gown.  Please remove any body piercings and hair extensions before you arrive. You will also remove watches, jewelry, hairpins, wallets, dentures, partial dental plates and hearing aids. You may wear contact lenses, and you may be able to wear your rings. We have a safe place to keep your personal items, but it is safer to leave them at home.   **IMPORTANT** THE INSTRUCTIONS BELOW ARE ONLY FOR THOSE PATIENTS WHO HAVE BEEN TOLD THEY WILL RECEIVE SEDATION OR GENERAL ANESTHESIA DURING THEIR MRI PROCEDURE:  IF YOU WILL RECEIVE SEDATION (take medicine to help you relax during your exam):   You must get the medicine from your doctor before you arrive. Bring the medicine to the exam. Do not take it at home.   Arrive one hour early. Bring someone who can take you home after the test. Your medicine will make you sleepy. After the exam, you may not drive, take a bus or take a taxi by yourself.   No eating 8 hours before your exam. You may have clear liquids up until 4 hours before your exam. (Clear liquids include water, clear tea, black coffee and fruit juice without pulp.)  IF YOU WILL RECEIVE ANESTHESIA (be asleep for your exam):   Arrive 1 1/2 hours early. Bring someone who can take you home after the test. You may not drive, take a bus or take a taxi by yourself.   No eating 8 hours before your exam. You may have clear liquids up until 4 hours before your exam. (Clear liquids include  water, clear tea, black coffee and fruit juice without pulp.)  If you have any questions, please contact your Imaging Department exam site.            Apr 25, 2017  2:40 PM CDT   (Arrive by 2:25 PM)   Return [place] with Nestor Phoenix MD   [facility] ([facility])    [address] [place] [id number]   [phone]            May 12, 2017  7:30 AM CDT   (Arrive by 7:15 AM)   New Patient Visit with Jesse Richardson, [facility] ([facility])    [address] [place] [phone]   [phone]            Jun 19, 2017  7:00 AM CDT   (Arrive by 6:45 AM)   Return Visit with Sebastian López [facility] ([facility])    [address] [place] [id number]   [phone]            Aug 14, 2017  7:45 AM CDT   Lab with  LAB   [facility] ([facility])    [address] [place] [phone]   [phone]            Aug 14, 2017  8:50 AM CDT   (Arrive by 8:35 AM)   Return General Liver with Ara Lugo MD   [facility] ([facility])    [address] [place] [phone]   [phone]              Further instructions from your care team       Caring for Your Incision    You ll need to care for your incision after surgery and certain medical procedures. To close an incision, your doctor used sutures (stitches), steri-strips, staples, or dermabond. Follow the tips on this sheet to help heal and prevent infection of your incision.   Types of Incision Closure:    Surgical Sutures (stitches) are placed by sewing the edges of an incision together with surgical thread. Sutures are either absorbable or non-absorbable. Absorbable sutures break down in the body over time.  Non-absorbable sutures need to be removed.     Steri-strips are made of adhesive (sticky) material to help hold the edges of an incision together. Steri-strips usually fall off by themselves in 7 to 10 days.     Surgical Staples are made or steel or titanium. They are often used to close shallow incisions. They are not used on certain body areas, such as the face and hands. This is because these areas have nerves that are close to the surface. Staples are usually removed within a week.     Dermabond (skin glue) is used to close a cut or small incision. The skin glue is less painful than stitches (sutures). In some cases, a lower layer of skin may be sutured before Dermabond is applied. The skin glue closes the cut/incision within a few minutes. It also provides a water-resistant covering. No bandage is required. Dermabond peels off on its own within 5 to 10 days.  Home Care for Your  Incision:    Keep the incision clean and dry. You should bathe only as directed by the doctor. It is okay to wash around the incision, but don t spray water directly on it.     Check the incision site daily for pain, redness, drainage, swelling, or separation of the incision edges.     If you have a dressing over the incision, change the dressing as directed by the doctor.    Make sure any clothing that touches the incision is loose fitting. This will prevent rubbing. If the incision is on the head, avoid wearing caps or other head coverings. These may rub against the incision.    Avoid rough play, contact sports, or physical activities. This can put you at risk of opening an incision.     As your incision heals, the skin may appear pink or red. It may also feel slightly bumpy or raised. This is called a healing ridge. Over time, the color should fade and the raised skin will become less noticeable.   Call the doctor right away if you have any of the following:    Increased pain, redness, drainage, swelling or bleeding at the incision  "site    Numbness, coldness or tingling around the incision site  Fever of 101 F degrees or higher          Pending Results     No orders found from 4/15/2017 to 4/18/2017.            Admission Information     Date & Time Provider Department Dept. Phone    4/17/2017 Nestor Phoenix MD [facility] [phone]      Your Vitals Were     Blood Pressure Temperature Respirations Height Weight Pulse Oximetry    105/67 97.5  F (36.4  C) (Oral) 15 1.727 m (5' 7.99\") 68.3 kg (150 lb 9.6 oz) 99%    BMI (Body Mass Index)                   22.9 kg/m2           [email or web address] Information     [employer] gives you secure access to your electronic health record. If you see a primary care provider, you can also send messages to your care team and make appointments. If you have questions, please call your primary care clinic.  If you do not have a primary care provider, please call [phone] and they will assist you.      [employer] is an electronic gateway that provides easy, online access to your medical records. With [employer], you can request a clinic appointment, read your test results, renew a prescription or communicate with your care team.     To access your existing account, please contact your [facility] or call [phone] for assistance.        Care EveryWhere ID     This is your Care EveryWhere ID. This could be used by other organizations to access your [facility] medical records  [id number]           Review of your medicines      UNREVIEWED medicines. Ask your doctor about these medicines        Dose / Directions    amitriptyline 10 MG tablet   Commonly known as:  ELAVIL   Used for:  Itching, Post-pancreatectomy diabetes (H), History of pancreatectomy, Pancreatic insufficiency        Dose:  20 mg   Take 2 tablets (20 mg) by mouth At Bedtime   Quantity:  60 tablet   Refills:  3       amylase-lipase-protease 64325 UNITS Cpep per EC capsule   Commonly known as:  CREON 24   Used " for:  Acquired total absence of pancreas        Take 3-4 capsules by mouth with meals and 1-2 with snacks. Maximum 15 capsules per day.   Quantity:  180 capsule   Refills:  3       aspirin 81 MG EC tablet   Used for:  High platelet count (H)        Dose:  81 mg   Take 1 tablet (81 mg) by mouth daily   Quantity:  90 tablet   Refills:  3       BOOST HIGH PROTEIN Liqd   Used for:  Post-pancreatectomy diabetes (H), Pancreatic insufficiency, History of pancreatectomy        After above baseline labs are drawn, give: 6 mL/kg to maximum of 360 mL; the beverage is to be consumed within 5 minutes.   Refills:  0       celecoxib 100 MG capsule   Commonly known as:  celeBREX   Used for:  Acute post-operative pain        Dose:  100 mg   Take 1 capsule (100 mg) by mouth 2 times daily   Quantity:  60 capsule   Refills:  1       cetirizine 10 MG tablet   Commonly known as:  zyrTEC   Used for:  Elevated liver enzymes        Dose:  10 mg   Take 10 mg by mouth daily   Refills:  0       cyclobenzaprine 5 MG tablet   Commonly known as:  FLEXERIL   Used for:  Muscle spasm        Take 5-10 mg by mouth three times per day as needed for muscle spasms.   Quantity:  42 tablet   Refills:  3       diphenhydrAMINE 25 MG capsule   Commonly known as:  BENADRYL ALLERGY   Used for:  Itching, Post-pancreatectomy diabetes (H), History of pancreatectomy, Pancreatic insufficiency        Dose:  25 mg   Take 1 capsule (25 mg) by mouth every 6 hours as needed for itching or allergies   Quantity:  56 capsule   Refills:  0       docusate sodium 100 MG capsule   Commonly known as:  COLACE   Used for:  Itching, Post-pancreatectomy diabetes (H), History of pancreatectomy, Pancreatic insufficiency        Take 1 capsule (100 mg) by mouth 2 times daily as needed for constipation,   Quantity:  60 capsule   Refills:  0       glucagon 1 MG kit   Commonly known as:  GLUCAGON EMERGENCY   Used for:  Post-pancreatectomy diabetes (H)        Dose:  1 mg   Inject 1 mg into  the muscle once for 1 dose   Quantity:  1 mg   Refills:  1       glucose 40 % Gel gel   Used for:  Acquired total absence of pancreas        Dose:  15-30 g   Take 15-30 g by mouth every 15 minutes as needed for low blood sugar   Quantity:  3 Tube   Refills:  2       * insulin aspart 100 UNIT/ML injection   Commonly known as:  NovoLOG PEN   Used for:  Acquired total absence of pancreas        aspart medium correction 1 unit per 50 > 130 every 4 hours ? For Pre-Meal  - 179 give 1 unit.  For Pre-Meal  - 230 give 2 units.  For Pre-Meal  - 281 give 3 units.  For Pre-Meal  - 332 give 4 units.   Refills:  3       * insulin aspart 100 UNIT/ML injection   Commonly known as:  NovoLOG PENFILL   Used for:  Post-pancreatectomy diabetes (H)        Administer subcutaneously 0.5 unit per 45 grams of carbohydrates.   Quantity:  3 mL   Refills:  3       insulin glargine 100 UNIT/ML injection   Commonly known as:  LANTUS   Used for:  Post-pancreatectomy diabetes (H)        Inject 4 units subcutaneously every morning.   Quantity:  3 mL   Refills:  3       LANsoprazole 30 MG CR capsule   Commonly known as:  PREVACID   Used for:  Post-pancreatectomy diabetes (H), Pancreatic insufficiency        Dose:  30 mg   Take 1 capsule (30 mg) by mouth daily Take 30-60 minutes before a meal.   Quantity:  30 capsule   Refills:  11       levothyroxine 125 MCG tablet   Commonly known as:  SYNTHROID/LEVOTHROID   Used for:  Itching, Post-pancreatectomy diabetes (H), History of pancreatectomy, Pancreatic insufficiency        Take 1 tablet (125 mcg), by mouth daily before breakfast.   Quantity:  1 tablet   Refills:  0       LORazepam 0.5 MG tablet   Commonly known as:  ATIVAN   Used for:  History of pancreatectomy, Anxiety        Dose:  0.5 mg   Take 1 tablet (0.5 mg) by mouth every 6 hours as needed for anxiety   Quantity:  20 tablet   Refills:  0       Lubiprostone 8 MCG Caps capsule   Used for:  Chronic constipation        Take 1  capsule (8 mcg) by mouth 2 times daily. Take 1 capsule by mouth po BID x 5 days. If no effect, increase to 2 capsules po BID x 5 days. If no effect, increase to 3 capsules po BID and continue until further instruction.   Quantity:  90 capsule   Refills:  3       multivitamin CF formula capsule   Commonly known as:  CHOICEFUL   Used for:  Itching, Post-pancreatectomy diabetes (H), History of pancreatectomy, Pancreatic insufficiency        Dose:  1 tablet   Take 1 tablet by mouth daily   Refills:  11       oxyCODONE 5 MG IR tablet   Commonly known as:  ROXICODONE   Used for:  Acute post-operative pain        Dose:  5-10 mg   Take 1-2 tablets (5-10 mg) by mouth every 4 hours as needed for moderate to severe pain   Quantity:  150 tablet   Refills:  0       polyethylene glycol Packet   Commonly known as:  MIRALAX/GLYCOLAX   Used for:  Itching, Post-pancreatectomy diabetes (H), History of pancreatectomy, Pancreatic insufficiency        Dose:  17 g   Take 17 g by mouth daily as needed for constipation   Quantity:  7 packet   Refills:  11       pregabalin 75 MG capsule   Commonly known as:  LYRICA   Used for:  Itching, Post-pancreatectomy diabetes (H), History of pancreatectomy, Pancreatic insufficiency        Dose:  75 mg   Take 1 capsule (75 mg) by mouth 2 times daily   Quantity:  150 capsule   Refills:  3       prochlorperazine 5 MG tablet   Commonly known as:  COMPAZINE   Used for:  Itching, Post-pancreatectomy diabetes (H), History of pancreatectomy, Pancreatic insufficiency        Take two 5 mg tablets by mouth every six hours as needed for nausea.   Quantity:  90 tablet   Refills:  3       senna-docusate 8.6-50 MG per tablet   Commonly known as:  SENOKOT-S;PERICOLACE   Used for:  Itching, Post-pancreatectomy diabetes (H), History of pancreatectomy, Pancreatic insufficiency        Dose:  1 tablet   Take 1 tablet by mouth 2 times daily as needed for constipation   Quantity:  100 tablet   Refills:  0       * Notice:   This list has 2 medication(s) that are the same as other medications prescribed for you. Read the directions carefully, and ask your doctor or other care provider to review them with you.      CONTINUE these medicines which have NOT CHANGED        Dose / Directions    BD SHARPS  Misc   Used for:  Post-pancreatectomy diabetes (H)        Dose:  1 Container   1 Container as needed   Quantity:  1 each   Refills:  1       blood glucose monitoring lancets   Used for:  Acute on chronic pancreatitis (H)        Use to test blood sugar 6-8 times daily or as directed.   Quantity:  1 Box   Refills:  prn       blood glucose monitoring test strip   Commonly known as:  PAT CONTOUR NEXT   Used for:  Acute on chronic pancreatitis (H)        Use to test blood sugar 6-8 times daily or as directed.   Quantity:  200 strip   Refills:  prn       CONTOUR NEXT EZ MONITOR W/DEVICE Kit   Used for:  Itching, Post-pancreatectomy diabetes (H), History of pancreatectomy, Pancreatic insufficiency        Dose:  1 Device   1 Device 6 times daily   Refills:  0       Injection Device for insulin Tika   Commonly known as:  NOVOPEN ECHO   Used for:  Post-pancreatectomy diabetes (H)        Dose:  1 Device   1 each daily as needed   Quantity:  1 each   Refills:  0       insulin pen needle 32G X 4 MM   Commonly known as:  BD KEN U/F   Used for:  Acquired total absence of pancreas        Use 6-8 times per day   Quantity:  100 each   Refills:  3                Protect others around you: Learn how to safely use, store and throw away your medicines at www.disposemymeds.org.             Medication List: This is a list of all your medications and when to take them. Check marks below indicate your daily home schedule. Keep this list as a reference.      Medications           Morning Afternoon Evening Bedtime As Needed    amitriptyline 10 MG tablet   Commonly known as:  ELAVIL   Take 2 tablets (20 mg) by mouth At Bedtime                                 amylase-lipase-protease 11829 UNITS Cpep per EC capsule   Commonly known as:  CREON 24   Take 3-4 capsules by mouth with meals and 1-2 with snacks. Maximum 15 capsules per day.                                aspirin 81 MG EC tablet   Take 1 tablet (81 mg) by mouth daily                                BD SHARPS  Misc   1 Container as needed                                blood glucose monitoring lancets   Use to test blood sugar 6-8 times daily or as directed.                                blood glucose monitoring test strip   Commonly known as:  ZAP CONTOUR NEXT   Use to test blood sugar 6-8 times daily or as directed.                                BOOST HIGH PROTEIN Liqd   After above baseline labs are drawn, give: 6 mL/kg to maximum of 360 mL; the beverage is to be consumed within 5 minutes.                                celecoxib 100 MG capsule   Commonly known as:  celeBREX   Take 1 capsule (100 mg) by mouth 2 times daily                                cetirizine 10 MG tablet   Commonly known as:  zyrTEC   Take 10 mg by mouth daily                                CONTOUR NEXT EZ MONITOR W/DEVICE Kit   1 Device 6 times daily                                cyclobenzaprine 5 MG tablet   Commonly known as:  FLEXERIL   Take 5-10 mg by mouth three times per day as needed for muscle spasms.                                diphenhydrAMINE 25 MG capsule   Commonly known as:  BENADRYL ALLERGY   Take 1 capsule (25 mg) by mouth every 6 hours as needed for itching or allergies                                docusate sodium 100 MG capsule   Commonly known as:  COLACE   Take 1 capsule (100 mg) by mouth 2 times daily as needed for constipation,                                glucagon 1 MG kit   Commonly known as:  GLUCAGON EMERGENCY   Inject 1 mg into the muscle once for 1 dose                                glucose 40 % Gel gel   Take 15-30 g by mouth every 15 minutes as needed for low blood sugar                                 Injection Device for insulin Tika   Commonly known as:  NOVOPEN ECHO   1 each daily as needed                                * insulin aspart 100 UNIT/ML injection   Commonly known as:  NovoLOG PEN   aspart medium correction 1 unit per 50 > 130 every 4 hours ? For Pre-Meal  - 179 give 1 unit.  For Pre-Meal  - 230 give 2 units.  For Pre-Meal  - 281 give 3 units.  For Pre-Meal  - 332 give 4 units.                                * insulin aspart 100 UNIT/ML injection   Commonly known as:  NovoLOG PENFILL   Administer subcutaneously 0.5 unit per 45 grams of carbohydrates.                                insulin glargine 100 UNIT/ML injection   Commonly known as:  LANTUS   Inject 4 units subcutaneously every morning.                                insulin pen needle 32G X 4 MM   Commonly known as:  BD KEN U/F   Use 6-8 times per day                                LANsoprazole 30 MG CR capsule   Commonly known as:  PREVACID   Take 1 capsule (30 mg) by mouth daily Take 30-60 minutes before a meal.                                levothyroxine 125 MCG tablet   Commonly known as:  SYNTHROID/LEVOTHROID   Take 1 tablet (125 mcg), by mouth daily before breakfast.                                LORazepam 0.5 MG tablet   Commonly known as:  ATIVAN   Take 1 tablet (0.5 mg) by mouth every 6 hours as needed for anxiety                                Lubiprostone 8 MCG Caps capsule   Take 1 capsule (8 mcg) by mouth 2 times daily. Take 1 capsule by mouth po BID x 5 days. If no effect, increase to 2 capsules po BID x 5 days. If no effect, increase to 3 capsules po BID and continue until further instruction.                                multivitamin CF formula capsule   Commonly known as:  CHOICEFUL   Take 1 tablet by mouth daily                                oxyCODONE 5 MG IR tablet   Commonly known as:  ROXICODONE   Take 1-2 tablets (5-10 mg) by mouth every 4 hours as needed for moderate to  severe pain                                polyethylene glycol Packet   Commonly known as:  MIRALAX/GLYCOLAX   Take 17 g by mouth daily as needed for constipation                                pregabalin 75 MG capsule   Commonly known as:  LYRICA   Take 1 capsule (75 mg) by mouth 2 times daily                                prochlorperazine 5 MG tablet   Commonly known as:  COMPAZINE   Take two 5 mg tablets by mouth every six hours as needed for nausea.                                senna-docusate 8.6-50 MG per tablet   Commonly known as:  SENOKOT-S;PERICOLACE   Take 1 tablet by mouth 2 times daily as needed for constipation                                * Notice:  This list has 2 medication(s) that are the same as other medications prescribed for you. Read the directions carefully, and ask your doctor or other care provider to review them with you.

## 2017-04-17 NOTE — LETTER
4/7/2017      RE: Dinora Clement Taz  [address]  [place] [address]       No notes on file    No name on file.

## 2017-04-18 ENCOUNTER — TELEPHONE (OUTPATIENT)
Dept: TRANSPLANT | Facility: CLINIC | Age: 52
End: 2017-04-18

## 2017-04-18 ASSESSMENT — ACTIVITIES OF DAILY LIVING (ADL): I_KEEP_THINKING_ABOUT_HOW_BADLY_I_WANT_THE_PAIN_TO_STOP: 3 = TO A GREAT DEGREE

## 2017-04-18 NOTE — TELEPHONE ENCOUNTER
Dinora is complaining of sever itching from last night. Took Benadryl and slept most of the day. Dinora stated that the reddened area around the incision is hard to the touch. Dr. Phoenix notified.

## 2017-04-19 LAB — COPATH REPORT: NORMAL

## 2017-04-20 ENCOUNTER — OFFICE VISIT (OUTPATIENT)
Dept: ANESTHESIOLOGY | Facility: CLINIC | Age: 52
End: 2017-04-20

## 2017-04-20 DIAGNOSIS — F43.89 ADJUSTMENT REACTION TO CHRONIC STRESS: Primary | ICD-10-CM

## 2017-04-20 NOTE — LETTER
4/20/2017       RE: Dinora Mcghee  [address] [facility] [address]     Dear Colleague,    Thank you for referring your patient, Dinora Mcghee, to the [facility] FOR COMPREHENSIVE PAIN MANAGEMENT at [facility]. Please see a copy of my visit note below.    [facility]   Comprehensive Pain Program   Psychology Progress Note    Patient Name: Dinora Mcghee    YOB: 1965   Medical Record Number: [id number]  Date: 4/20/2017              SUBJECTIVE     Psychology Department of Chronic Pain Clinic Return Patient Questionnaire  Psychology Department of Chronic Pain Clinic Return Patient Questionnaire 4/18/2017   Your exercise has been: better   Your imagery breathing practice has been: better   Your use of bilateral music (auditory EMDR) with headphones has been: regular   Your use of once a day guided imagery is: regular   Your level of pain has been? slightly better   Your activity level has been? slightly better   Your stress level has been? slightly better   Your sleep has been? worse               Interval history: has had a few complications post op and now is being worked up for a liver auto immune disease.  Feels labile and anxious.  Confused about reentering life and making choices that disappoint others       OBJECTIVE:              Length of Visit: 55 minutes        Mental status: Mild Distress and Tearful         Session objective:   Continued behavioral pain management skill improvement         Behavioral inventions and response:                           CBT  On change and boundaries  And people pleasing.                                 ASSESSMENT          Diagnoses: }                               F43.8      Adjustment reaction to chronic stress                                                                 Psychosocial and Contextual Factors: had fluctuating course         Progress toward goals: as expected.     PLAN:                "Next Appointment: Dinoraalma Mcghee will schedule a follow-up appointment in 1 weeks.         Assignment: Utilize  Guided imagery  \"EASE  PAIN\"  at least once a day and auditory EMDR         Objectives / interventions for next session:          Improve pain management skills: Goals include:  Healthy breathing skills  for daily use, Learn to pace and moderate physical activity and Resource Brainspotting/Eye Position therapy       Maria Elena Abdalla, Ph.D., L.P. ...............4/20/2017 3:04 PM                Medical Psychologist     "

## 2017-04-20 NOTE — PROGRESS NOTES
"[facility]   Comprehensive Pain Program   Psychology Progress Note    Patient Name: Dinora Mcghee    YOB: 1965   Medical Record Number: [id number]  Date: 4/20/2017              SUBJECTIVE         Psychology Department of Chronic Pain Clinic Return Patient Questionnaire  Psychology Department of Chronic Pain Clinic Return Patient Questionnaire 4/18/2017   Your exercise has been: better   Your imagery breathing practice has been: better   Your use of bilateral music (auditory EMDR) with headphones has been: regular   Your use of once a day guided imagery is: regular   Your level of pain has been? slightly better   Your activity level has been? slightly better   Your stress level has been? slightly better   Your sleep has been? worse               Interval history: has had a few complications post op and now is being worked up for a liver auto immune disease.  Feels labile and anxious.  Confused about reentering life and making choices that disappoint others       OBJECTIVE:              Length of Visit: 55 minutes        Mental status: Mild Distress and Tearful         Session objective:   Continued behavioral pain management skill improvement         Behavioral inventions and response:                           CBT  On change and boundaries  And people pleasing.                                             ASSESSMENT          Diagnoses: }                               F43.8      Adjustment reaction to chronic stress                                                                 Psychosocial and Contextual Factors: had fluctuating course         Progress toward goals: as expected.                PLAN:               Next Appointment: Dinora Mcghee will schedule a follow-up appointment in 1 weeks.         Assignment: Utilize  Guided imagery  \"EASE  PAIN\"  at least once a day and auditory EMDR         Objectives / interventions for next session:          Improve pain management skills: Goals " include:  Healthy breathing skills  for daily use, Learn to pace and moderate physical activity and Resource Brainspotting/Eye Position therapy               Maria Elena Abdalla, Ph.D., L.P. ...............4/20/2017 3:04 PM                Medical Psychologist

## 2017-04-20 NOTE — MR AVS SNAPSHOT
After Visit Summary   4/20/2017    Dinora Mcghee    MRN: [id number]           Patient Information     Date Of Birth          1965        Visit Information        Provider Department      4/20/2017 3:00 PM Maria Elena Abdalla, PhD [facility] for Comprehensive Pain Management        Today's Diagnoses     Adjustment reaction to chronic stress    -  1       Follow-ups after your visit        Your next 10 appointments already scheduled     Apr 25, 2017  2:40 PM CDT   (Arrive by 2:25 PM)   Return Auto Islet with Nestor Phoenix MD   [facility] Solid Organ Transplant ([facility])    [address] [place] [address]  [address]rd [facility] [address]   [phone]            May 01, 2017  6:00 PM CDT   (Arrive by 5:45 PM)   Return Visit with Maria Elena Abdalla, PhD   [facility] for Comprehensive Pain Management ([facility])    [address] [place] [address]  [address] [facility] [address]   [phone]            May 12, 2017  7:30 AM CDT   (Arrive by 7:15 AM)   New Patient Visit with Jesse Richardson DO   [facility] Rheumatology ([facility])    [address] [place] [address]   [phone]            Jun 19, 2017  7:00 AM CDT   (Arrive by 6:45 AM)   Return Visit with Sebastian López DO   [facility] for Comprehensive Pain Management ([facility])    [address] [place] [address]  4th [facility] [address]   [phone]            Aug 14, 2017  7:45 AM CDT   Lab with  LAB   [facility] Lab ([facility])    [address] [place] [address]  1st [facility] [address]   [phone]            Aug 14, 2017  8:50 AM CDT   (Arrive by 8:35 AM)   Return General Liver with Ara Lugo MD   [facility] Hepatology ([facility])    [address] [place] [address]   [phone]               Who to contact     Please call your clinic at [phone] to:    Ask questions about your health    Make or cancel appointments    Discuss your medicines    Learn about your test results    Speak to your doctor   If you have compliments or concerns about an experience at your clinic, or if you wish to file a complaint, please contact [facility] Patient Relations at [phone] or email us at [email or web address]         Additional Information About Your Visit        MyChart Information     [employer] gives you secure access to your electronic health record. If you see a primary care provider, you can also send messages to your care team and make appointments. If you have questions, please call your primary care clinic.  If you do not have a primary care provider, please call [phone] and they will assist you.      [employer] is an electronic gateway that provides easy, online access to your medical records. With [employer], you can request a clinic appointment, read your test results, renew a prescription or communicate with your care team.     To access your existing account, please contact your [facility] Clinic or call [phone] for assistance.        Care EveryWhere ID     This is your Care EveryWhere ID. This could be used by other organizations to access your [facility] medical records  [id number]         Blood Pressure from Last 3 Encounters:   04/17/17 107/67   04/10/17 101/62   04/10/17 101/62    Weight from Last 3 Encounters:   04/17/17 68.3 kg (150 lb 9.6 oz)   04/10/17 68.3 kg (150 lb 9.6 oz)   04/10/17 68.3 kg (150 lb 9.6 oz)              Today, you had the following     No orders found for display       Primary Care Provider Office Phone # Fax #    Justyna Lawson [phone]       [facility] [address] [facility] [address]  [place] [facility] [address]        Thank you!     Thank you for choosing [facility] FOR  COMPREHENSIVE PAIN MANAGEMENT  for your care. Our goal is always to provide you with excellent care. Hearing back from our patients is one way we can continue to improve our services. Please take a few minutes to complete the written survey that you may receive in the mail after your visit with us. Thank you!             Your Updated Medication List - Protect others around you: Learn how to safely use, store and throw away your medicines at www.disposemymeds.org.          This list is accurate as of: 4/20/17 11:59 PM.  Always use your most recent med list.                   Brand Name Dispense Instructions for use    amitriptyline 10 MG tablet    ELAVIL    60 tablet    Take 2 tablets (20 mg) by mouth At Bedtime       amylase-lipase-protease 98718 UNITS Cpep per EC capsule    CREON 24    180 capsule    Take 3-4 capsules by mouth with meals and 1-2 with snacks. Maximum 15 capsules per day.       aspirin 81 MG EC tablet     90 tablet    Take 1 tablet (81 mg) by mouth daily       BD SHARPS  Misc     1 each    1 Container as needed       blood glucose monitoring lancets     1 Box    Use to test blood sugar 6-8 times daily or as directed.       blood glucose monitoring test strip    PAT CONTOUR NEXT    200 strip    Use to test blood sugar 6-8 times daily or as directed.       BOOST HIGH PROTEIN Liqd      After above baseline labs are drawn, give: 6 mL/kg to maximum of 360 mL; the beverage is to be consumed within 5 minutes.       celecoxib 100 MG capsule    celeBREX    60 capsule    Take 1 capsule (100 mg) by mouth 2 times daily       cetirizine 10 MG tablet    zyrTEC     Take 10 mg by mouth daily       CONTOUR NEXT EZ MONITOR W/DEVICE Kit      1 Device 6 times daily       cyclobenzaprine 5 MG tablet    FLEXERIL    42 tablet    Take 5-10 mg by mouth three times per day as needed for muscle spasms.       diphenhydrAMINE 25 MG capsule    BENADRYL ALLERGY    56 capsule    Take 1 capsule (25 mg) by mouth every 6  hours as needed for itching or allergies       docusate sodium 100 MG capsule    COLACE    60 capsule    Take 1 capsule (100 mg) by mouth 2 times daily as needed for constipation,       glucagon 1 MG kit    GLUCAGON EMERGENCY    1 mg    Inject 1 mg into the muscle once for 1 dose       glucose 40 % Gel gel     3 Tube    Take 15-30 g by mouth every 15 minutes as needed for low blood sugar       Injection Device for insulin Tika    NOVOPEN ECHO    1 each    1 each daily as needed       * insulin aspart 100 UNIT/ML injection    NovoLOG PEN     aspart medium correction 1 unit per 50 > 130 every 4 hours ? For Pre-Meal  - 179 give 1 unit.  For Pre-Meal  - 230 give 2 units.  For Pre-Meal  - 281 give 3 units.  For Pre-Meal  - 332 give 4 units.       * insulin aspart 100 UNIT/ML injection    NovoLOG PENFILL    3 mL    Administer subcutaneously 0.5 unit per 45 grams of carbohydrates.       insulin glargine 100 UNIT/ML injection    LANTUS    3 mL    Inject 4 units subcutaneously every morning.       insulin pen needle 32G X 4 MM    BD KEN U/F    100 each    Use 6-8 times per day       LANsoprazole 30 MG CR capsule    PREVACID    30 capsule    Take 1 capsule (30 mg) by mouth daily Take 30-60 minutes before a meal.       levothyroxine 125 MCG tablet    SYNTHROID/LEVOTHROID    1 tablet    Take 1 tablet (125 mcg), by mouth daily before breakfast.       LORazepam 0.5 MG tablet    ATIVAN    20 tablet    Take 1 tablet (0.5 mg) by mouth every 6 hours as needed for anxiety       Lubiprostone 8 MCG Caps capsule     90 capsule    Take 1 capsule (8 mcg) by mouth 2 times daily. Take 1 capsule by mouth po BID x 5 days. If no effect, increase to 2 capsules po BID x 5 days. If no effect, increase to 3 capsules po BID and continue until further instruction.       multivitamin CF formula capsule    CHOICEFUL     Take 1 tablet by mouth daily       oxyCODONE 5 MG IR tablet    ROXICODONE    150 tablet    Take 1-2 tablets  (5-10 mg) by mouth every 4 hours as needed for moderate to severe pain       polyethylene glycol Packet    MIRALAX/GLYCOLAX    7 packet    Take 17 g by mouth daily as needed for constipation       pregabalin 75 MG capsule    LYRICA    150 capsule    Take 1 capsule (75 mg) by mouth 2 times daily       prochlorperazine 5 MG tablet    COMPAZINE    90 tablet    Take two 5 mg tablets by mouth every six hours as needed for nausea.       senna-docusate 8.6-50 MG per tablet    SENOKOT-S;PERICOLACE    100 tablet    Take 1 tablet by mouth 2 times daily as needed for constipation       * Notice:  This list has 2 medication(s) that are the same as other medications prescribed for you. Read the directions carefully, and ask your doctor or other care provider to review them with you.

## 2017-04-24 ENCOUNTER — TELEPHONE (OUTPATIENT)
Dept: TRANSPLANT | Facility: CLINIC | Age: 52
End: 2017-04-24

## 2017-04-24 DIAGNOSIS — E13.9 POST-PANCREATECTOMY DIABETES (H): ICD-10-CM

## 2017-04-24 DIAGNOSIS — E89.1 POST-PANCREATECTOMY DIABETES (H): ICD-10-CM

## 2017-04-24 DIAGNOSIS — Z90.410 POST-PANCREATECTOMY DIABETES (H): ICD-10-CM

## 2017-04-24 DIAGNOSIS — K86.89 PANCREATIC INSUFFICIENCY: Primary | ICD-10-CM

## 2017-04-24 NOTE — TELEPHONE ENCOUNTER
"Spoke with Dinora and she continues the 38 mcg Amitiza (24 mcg), BID.    Dinora states that about every 5 days her bowels \"just stop\". During this time Dinora will take Miralax BID until she has a bowel movement, usually 3 days after. She stated that she has been like this for years. She has not taken any pain medication since last Wednesday and reports how much clearer she is feeling. Her foreign body surgical site is healing well and is not bothering her any longer. She has also not taken any of her anxiety medication for several days. She stated that if her should would get better than she would feel great. Dinora enjoyed meeting with Dr. Maria Elena Abdalla last week and that this helped her a lot and that she will be seeing her again soon. Dinora is trying not to worry so much about not sleeping. She has no complaints at this time.  "

## 2017-04-24 NOTE — TELEPHONE ENCOUNTER
Food  Food Grams Carbs (g) Date/Time Fiber (g) Sugar (g) Notes   salad - culvers 650 20 Apr 22, 2017 9:10:12 PM 0 0    cheese, crackers, sausage and grapes 375 22 Apr 22, 2017 7:00:00 PM 0 0    sunmaid yogurt raisins 45 0 Apr 22, 2017 4:55:17 PM 0 0    pizza 550 66 Apr 22, 2017 2:35:32 PM 0 0    cheese, crackers, sausage 350 20 Apr 22, 2017 1:42:23 PM 0 0    egg, sausage, cheese muffin 750 28 Apr 22, 2017 10:00:00 AM 0 0    popcorn 6 cups & ice cream cone _tony 450 72 Apr 21, 2017 8:52:00 PM 0 0    beef, bread, butter, salad  150 0 Apr 21, 2017 8:04:26 PM 0 0    greek yogurt & 1/4 scoop protein powder 150 0 Apr 21, 2017 5:34:25 PM 0 0    sugar free popsicle  10 4 Apr 21, 2017 2:25:15 PM 0 0    tomato basil soup 1 cup and crackers & cheddar cheese 300 45 Apr 21, 2017 12:31:50 PM 0 0    Greek yogurt 2/3 cup, krystyna seeds, flax, raspberries  325 23 Apr 21, 2017 8:30:00 AM 0 0    6 crackers & 1T cream cheese 150 18 Apr 20, 2017 11:00:32 PM 0 0    Hamlin salad, cherry pie 1200 120 Apr 20, 2017 5:45:00 PM 0 0    stonefire aydin crisps 120 18 Apr 20, 2017 2:16:42 PM 1 2    tomato basil soup 1 cup and crackers & cream cheese 300 45 Apr 20, 2017 12:29:55 PM 0 0    Greek yogurt 2/3 cup, krystyna seeds, flax, raspberries  325 23 Apr 20, 2017 12:18:30 PM 0 0    cereal 350 45 Apr 19, 2017 11:30:00 PM 0 0 Covered 1.0   sunmaid yogurt raisins 45 0 Apr 19, 2017 10:25:55 PM 0 0    sugar free popsicle  10 4 Apr 19, 2017 10:25:43 PM 0 0    2 beef oh, 1 bun, mustard, big salad, low fat, st dressing 1200 40 Apr 19, 2017 6:51:06 PM 0 0    white bean hummus & chips, 3 whoppers 220 0 Apr 19, 2017 4:00:00 PM 0 0    salad wrap 350 40 Apr 19, 2017 12:40:00 PM 0 0    Greek yogurt 1/2 cup, krystyna seeds, flax, raspberries & protein powder 400 26 Apr 19, 2017 8:05:00 AM 0 0    ice cream & popcorn 350 60 Apr 18, 2017 8:30:00 PM 0 0 Covered   2 beef wieners & 1 bun 350 0 Apr 18, 2017 6:57:03 PM 0 0    chicken soup with rice, carrots & potato 500 35 Apr  18, 2017 6:56:14 PM 0 0    chips & salsa 100 40 Apr 18, 2017 3:30:00 PM 0 0    Greek yogurt 1/2 cup, krystyna seeds, flax, blueberries & protein powder 400 0 Apr 18, 2017 1:50:00 PM 0 0    chicken soup with rice, avocado, carrots & potato 750 0 Apr 17, 2017 10:45:36 PM 0 0    chipotle salad, 1/3 order chips & guacamole 650 125 Apr 17, 2017 3:11:00 PM 0 0    Blood Sugar  Concentration Event Notes Date/Time   119 mg/dL AfterSleep  Apr 23, 2017 9:30:58 AM   136 mg/dL BeforeSleep Corrected Apr 22, 2017 10:53:00 PM   114 mg/dL AfterDinner  Apr 22, 2017 8:59:20 PM   94 mg/dL BeforeDinner Apr 22, 2017 4:54:25 PM   133 mg/dL AfterLunch Corrected Apr 22, 2017 2:34:47 PM   114 mg/dL BeforeLunch  Apr 22, 2017 1:41:14 PM   122 mg/dL AfterSleep  Apr 22, 2017 7:05:00 AM   131 mg/dL BeforeSleep Corrected Apr 21, 2017 11:10:00 PM   104 mg/dL AfterDinner  Apr 21, 2017 8:04:15 PM   166 mg/dL AfterLunch Corrected Apr 21, 2017 2:24:06 PM   105 mg/dL BeforeLunch  Apr 21, 2017 12:34:00 PM   112 mg/dL AfterSleep  Apr 21, 2017 7:57:14 AM   137 mg/dL BeforeSleep Corrected Apr 20, 2017 10:52:55 PM   97 mg/dL AfterDinner  Apr 20, 2017 7:54:00 PM   102 mg/dL BeforeDinner  Apr 20, 2017 5:22:00 PM   123 mg/dL BeforeLunch  Apr 20, 2017 12:18:02 PM   116 mg/dL AfterSleep  Apr 20, 2017 9:37:00 AM   132 mg/dL BeforeSleep  Apr 20, 2017 12:03:50 AM   117 mg/dL AfterDinner  Apr 19, 2017 8:58:00 PM   161 mg/dL BeforeDinner  Apr 19, 2017 5:41:02 PM   115 mg/dL BeforeLunch  Apr 19, 2017 12:40:00 PM   108 mg/dL AfterSleep  Apr 19, 2017 7:41:00 AM   84 mg/dL BeforeSleep  Apr 18, 2017 10:27:00 PM   122 mg/dL Other Recheck Apr 18, 2017 8:57:00 PM   151 mg/dL AfterDinner Corrected for overeating carbs to raise b.g. Novalog 1.0 given Apr 18, 2017 6:59:05 PM   75 mg/dL BeforeDinner  Apr 18, 2017 6:10:00 PM   85 mg/dL BeforeLunch  Apr 18, 2017 1:33:00 PM   114 mg/dL AfterSleep  Apr 18, 2017 6:47:00 AM   110 mg/dL BeforeSleep  Apr 17, 2017 10:43:35 PM   82 mg/dL  BeforeDinner  Apr 17, 2017 6:46:00 PM   89 mg/dL BeforeLunc  Apr 17, 2017 2:05:00 PM   Medication  Name Dosage Unit Notes Date/Time   Lantus 4.0 [id number]  Apr 23, 2017 9:32:24 AM   Novalog 1.0 [id number] Correction Apr 22, 2017 10:55:00 PM   Novalog 3.0 [id number] Correction & coverage Apr 22, 2017 2:35:08 PM   Lantus 4.0 [id number]  Apr 22, 2017 7:05:00 AM   Novalog 1.0 [id number]  Apr 21, 2017 11:10:40 PM   Novalog 1.0 [id number]  Apr 21, 2017 8:51:48 PM   Novalog 2.0 [id number]  per chart 2u novalog Apr 21, 2017 2:24:27 PM   Lantus 4.0 [id number]  Apr 21, 2017 7:57:05 AM   Novalog 1.0 [id number] Corrected  Apr 20, 2017 10:53:49 PM   Novalog 1.5 [id number]  Apr 20, 2017 5:30:00 PM   Lantus 4.0 [id number]  Apr 20, 2017 9:40:00 AM   Novalog 1.0 [id number]  Apr 19, 2017 11:30:00 PM   Novalog 1.0 [id number]  Apr 19, 2017 5:41:11 PM   Lantus 4.0 [id number]  Apr 19, 2017 7:45:00 AM   Novalog 1.0 [id number] Coverage Apr 18, 2017 8:30:00 PM   Novalog 1.0 [id number] Correction Apr 18, 2017 7:00:19 PM   Lantus 4.0 [id number]  Apr 18, 2017 7:00:00 AM   Novalog 1.5 [id number] Lunch coverage Apr 17, 2017 3:19:10 PM   Weight

## 2017-04-25 ENCOUNTER — OFFICE VISIT (OUTPATIENT)
Dept: TRANSPLANT | Facility: CLINIC | Age: 52
End: 2017-04-25
Attending: SURGERY
Payer: COMMERCIAL

## 2017-04-25 VITALS
OXYGEN SATURATION: 99 % | HEART RATE: 101 BPM | RESPIRATION RATE: 16 BRPM | TEMPERATURE: 98.1 F | WEIGHT: 150.8 LBS | DIASTOLIC BLOOD PRESSURE: 78 MMHG | BODY MASS INDEX: 23.67 KG/M2 | SYSTOLIC BLOOD PRESSURE: 114 MMHG | HEIGHT: 67 IN

## 2017-04-25 DIAGNOSIS — K86.89 PANCREATIC INSUFFICIENCY: ICD-10-CM

## 2017-04-25 DIAGNOSIS — E13.9 POST-PANCREATECTOMY DIABETES (H): ICD-10-CM

## 2017-04-25 DIAGNOSIS — E89.1 POST-PANCREATECTOMY DIABETES (H): ICD-10-CM

## 2017-04-25 DIAGNOSIS — R10.9 ABDOMINAL WALL PAIN: Primary | ICD-10-CM

## 2017-04-25 DIAGNOSIS — D50.8 IRON DEFICIENCY ANEMIA DUE TO DIETARY CAUSES: ICD-10-CM

## 2017-04-25 DIAGNOSIS — Z90.410 POST-PANCREATECTOMY DIABETES (H): ICD-10-CM

## 2017-04-25 LAB
ALBUMIN SERPL-MCNC: 3.7 G/DL (ref 3.4–5)
ALP SERPL-CCNC: 363 U/L (ref 40–150)
ALT SERPL W P-5'-P-CCNC: 85 U/L (ref 0–50)
ANION GAP SERPL CALCULATED.3IONS-SCNC: 4 MMOL/L (ref 3–14)
AST SERPL W P-5'-P-CCNC: 58 U/L (ref 0–45)
BASOPHILS # BLD AUTO: 0.1 10E9/L (ref 0–0.2)
BASOPHILS NFR BLD AUTO: 2.5 %
BILIRUB SERPL-MCNC: 0.9 MG/DL (ref 0.2–1.3)
BUN SERPL-MCNC: 16 MG/DL (ref 7–30)
CALCIUM SERPL-MCNC: 9.3 MG/DL (ref 8.5–10.1)
CHLORIDE SERPL-SCNC: 107 MMOL/L (ref 94–109)
CO2 SERPL-SCNC: 31 MMOL/L (ref 20–32)
CREAT SERPL-MCNC: 0.62 MG/DL (ref 0.52–1.04)
DIFFERENTIAL METHOD BLD: ABNORMAL
EOSINOPHIL # BLD AUTO: 0.5 10E9/L (ref 0–0.7)
EOSINOPHIL NFR BLD AUTO: 11.2 %
ERYTHROCYTE [DISTWIDTH] IN BLOOD BY AUTOMATED COUNT: 17.5 % (ref 10–15)
GFR SERPL CREATININE-BSD FRML MDRD: ABNORMAL ML/MIN/1.7M2
GLUCOSE SERPL-MCNC: 111 MG/DL (ref 70–99)
HCT VFR BLD AUTO: 36.1 % (ref 35–47)
HGB BLD-MCNC: 11.1 G/DL (ref 11.7–15.7)
IMM GRANULOCYTES # BLD: 0 10E9/L (ref 0–0.4)
IMM GRANULOCYTES NFR BLD: 0 %
LYMPHOCYTES # BLD AUTO: 2 10E9/L (ref 0.8–5.3)
LYMPHOCYTES NFR BLD AUTO: 44.5 %
MCH RBC QN AUTO: 25.3 PG (ref 26.5–33)
MCHC RBC AUTO-ENTMCNC: 30.7 G/DL (ref 31.5–36.5)
MCV RBC AUTO: 82 FL (ref 78–100)
MONOCYTES # BLD AUTO: 0.7 10E9/L (ref 0–1.3)
MONOCYTES NFR BLD AUTO: 15.1 %
NEUTROPHILS # BLD AUTO: 1.2 10E9/L (ref 1.6–8.3)
NEUTROPHILS NFR BLD AUTO: 26.7 %
NRBC # BLD AUTO: 0 10*3/UL
NRBC BLD AUTO-RTO: 0 /100
PLATELET # BLD AUTO: 543 10E9/L (ref 150–450)
POTASSIUM SERPL-SCNC: 4.4 MMOL/L (ref 3.4–5.3)
PREALB SERPL IA-MCNC: 16 MG/DL (ref 15–45)
PROT SERPL-MCNC: 7.7 G/DL (ref 6.8–8.8)
RBC # BLD AUTO: 4.38 10E12/L (ref 3.8–5.2)
SODIUM SERPL-SCNC: 142 MMOL/L (ref 133–144)
WBC # BLD AUTO: 4.4 10E9/L (ref 4–11)

## 2017-04-25 PROCEDURE — 36415 COLL VENOUS BLD VENIPUNCTURE: CPT | Performed by: SURGERY

## 2017-04-25 PROCEDURE — 85025 COMPLETE CBC W/AUTO DIFF WBC: CPT | Performed by: SURGERY

## 2017-04-25 PROCEDURE — 80053 COMPREHEN METABOLIC PANEL: CPT | Performed by: SURGERY

## 2017-04-25 PROCEDURE — 84134 ASSAY OF PREALBUMIN: CPT | Performed by: SURGERY

## 2017-04-25 NOTE — NURSING NOTE
"Chief Complaint   Patient presents with     TP-IAT Transplant            Initial /78 (BP Location: Right arm, Patient Position: Chair, Cuff Size: Adult Regular)  Pulse 101  Temp 98.1  F (36.7  C) (Oral)  Resp 16  Ht 1.702 m (5' 7\")  Wt 68.4 kg (150 lb 12.8 oz)  SpO2 99%  BMI 23.62 kg/m2 Estimated body mass index is 23.62 kg/(m^2) as calculated from the following:    Height as of this encounter: 1.702 m (5' 7\").    Weight as of this encounter: 68.4 kg (150 lb 12.8 oz).  Medication Reconciliation: complete    "

## 2017-04-25 NOTE — LETTER
4/25/2017      RE: Dinora Mcghee  [address] [facility] [address]       Chronic Pancreatitis Group Progress Note    I had the pleasure of seeing Ms. Dinora Mcghee today. 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 Interval events since last visit with me:   ER visits = 0, reasons: n/a  Inpatient admissions = 0, reasons n/a  The patient reports their current symptoms to be:   GI function:   Nausea:   [x]  none    []  rare    []  often    []  daily  Comment:   Vomiting: [x]  none    []  rare    []  often    []  daily   Comment:   Last BM: mostly regular, now taking amitiza 24 mcg/day.  Miralax most days.  Stools are soft, frequency : mostly daily.  Very large volume.   Appetite: good  Oral food intake: 3 per day, with night time snack.  Pancreatic exocrine insufficiency:   Takes (Creon 24), 3-5 with meals, 1-2 with snacks.  Greasy stools: [x]  none   []  rarely    []  several times/wk   []  daily      Comment:   Diabetes management:   Long acting insulin: Lantus--5 units/day  Short acting insulin: Novolog--1-3 units/day  Blood sugars typically range from 91 to 166.   Lab Results   Component Value Date    A1C 5.6 04/04/2017    A1C 4.5 12/19/2016    A1C 4.5 11/29/2016      Pain management:   Pain rating (0=no pain, 10=unbearable): 0 (except arm)  Long acting agent: none.  Celebrex 100 mg BID. Lyrica BID  Short acting agent: none. Tylenol prn for shoulder.  Daily 'Uptime': most days up but no endurance.     Walking: distance 3 miles per day times per week  Prescription Medications as of 4/25/2017             cetirizine (ZYRTEC) 10 MG tablet Take 10 mg by mouth daily    LORazepam (ATIVAN) 0.5 MG tablet Take 1 tablet (0.5 mg) by mouth every 6 hours as needed for anxiety    Lubiprostone 8 MCG CAPS capsule Take 1 capsule (8 mcg) by mouth 2 times daily.  Take 1 capsule by mouth po BID x 5 days.  If no effect, increase to 2 capsules po BID x 5 days.  If no effect, increase to 3  capsules po BID and continue until further instruction.    insulin aspart (NOVOLOG PENFILL) 100 UNIT/ML injection Administer subcutaneously 0.5 unit per 45 grams of carbohydrates.    Nutritional Supplements (BOOST HIGH PROTEIN) LIQD After above baseline labs are drawn, give: 6 mL/kg to maximum of 360 mL; the beverage is to be consumed within 5 minutes.    insulin glargine (LANTUS) 100 UNIT/ML injection Inject 4 units subcutaneously every morning.    cyclobenzaprine (FLEXERIL) 5 MG tablet Take 5-10 mg by mouth three times per day as needed for muscle spasms.    oxyCODONE (ROXICODONE) 5 MG IR tablet Take 1-2 tablets (5-10 mg) by mouth every 4 hours as needed for moderate to severe pain    amylase-lipase-protease (CREON 24) 96296 UNITS CPEP per EC capsule Take 3-4 capsules by mouth with meals and 1-2 with snacks. Maximum 15 capsules per day.    insulin aspart (NOVOLOG PEN) 100 UNIT/ML injection aspart medium correction 1 unit per 50 > 130 every 4 hours    For Pre-Meal  - 179 give 1 unit.   For Pre-Meal  - 230 give 2 units.   For Pre-Meal  - 281 give 3 units.   For Pre-Meal  - 332 give 4 units.    amitriptyline (ELAVIL) 10 MG tablet Take 2 tablets (20 mg) by mouth At Bedtime    senna-docusate (SENOKOT-S;PERICOLACE) 8.6-50 MG per tablet Take 1 tablet by mouth 2 times daily as needed for constipation    prochlorperazine (COMPAZINE) 5 MG tablet Take two 5 mg tablets by mouth every six hours as needed for nausea.    polyethylene glycol (MIRALAX/GLYCOLAX) Packet Take 17 g by mouth daily as needed for constipation    diphenhydrAMINE (BENADRYL ALLERGY) 25 MG capsule Take 1 capsule (25 mg) by mouth every 6 hours as needed for itching or allergies    levothyroxine (SYNTHROID/LEVOTHROID) 125 MCG tablet Take 1 tablet (125 mcg), by mouth daily before breakfast.    docusate sodium (COLACE) 100 MG capsule Take 1 capsule (100 mg) by mouth 2 times daily as needed for constipation,    multivitamin CF formula  (CHOICEFUL) capsule Take 1 tablet by mouth daily    Blood Glucose Monitoring Suppl (CONTOUR NEXT EZ MONITOR) W/DEVICE KIT 1 Device 6 times daily    pregabalin (LYRICA) 75 MG capsule Take 1 capsule (75 mg) by mouth 2 times daily    celecoxib (CELEBREX) 100 MG capsule Take 1 capsule (100 mg) by mouth 2 times daily    glucagon (GLUCAGON EMERGENCY) 1 MG kit Inject 1 mg into the muscle once for 1 dose    LANsoprazole (PREVACID) 30 MG CR capsule Take 1 capsule (30 mg) by mouth daily Take 30-60 minutes before a meal.    aspirin 81 MG EC tablet Take 1 tablet (81 mg) by mouth daily    Injection Device for insulin (NOVOPEN ECHO) MARCO 1 each daily as needed    insulin pen needle (BD KEN U/F) 32G X 4 MM Use 6-8 times per day    glucose 40 % GEL gel Take 15-30 g by mouth every 15 minutes as needed for low blood sugar    Sharps Container (BD SHARPS ) MISC 1 Container as needed    blood glucose monitoring ([employer] CONTOUR NEXT) test strip Use to test blood sugar 6-8 times daily or as directed.    blood glucose monitoring (PAT MICROLET) lancets Use to test blood sugar 6-8 times daily or as directed.        Physical exam:   General Appearance: in no apparent distress.   Temp:  [98.1  F (36.7  C)] 98.1  F (36.7  C)  Pulse:  [101] 101  Resp:  [16] 16  BP: (114)/(78) 114/78  SpO2:  [99 %] 99 %   Skin: Normal, no rashes or jaundice  Heart: Regular rhythm.  Lungs: no audible wheezes or increased work of breathing.  Abdomen: The abdomen is rounded, and the wound is healing well, without hernia.  Tube exit site isclean. The abdomen is non-tender.  Edema: absent.    Chronic Pancreatitis Latest Ref Rng & Units 4/5/2017 4/10/2017 4/10/2017 4/17/2017 4/25/2017   Weight - - 68.312 kg 68.312 kg 68.312 kg 68.402 kg   AMYLASE 30 - 110 U/L - - - - -   LIPASE 73 - 393 U/L - - - - -   HEMOGLOBIN A1C 4.3 - 6.0 % - - - - -   C PEPTIDE 0.9 - 6.9 ng/mL 2.0 - - - -   GLUCOSE 70 - 99 mg/dL 109(H) - - - 111(H)   HGB 11.7 - 15.7 g/dL - - - -  11.1(L)    - 450 10e9/L - - - - 543(H)   ALBUMIN 3.4 - 5.0 g/dL - - - - 3.7   PREALBUMIN 15 - 45 mg/dL - - - - 16     Assessment and Plan: She is doing well s/p TP-IAT.  Interval progress has been good.  Pancreatic exocrine insufficiency: well controlled.  Malnutrition: not present  Diabetes mellitus: well controlled, making progress.  Abdominal pain management: not an issue. Will continue to work with physical therapy on shoulder pain.    Anxiety/Sleep: working with Maria Elena Abdalla on insomnia, with some recent progress.  Followup: I will see her again in clinic in 2 months    Total time: 25 min, Counselling Time: 20 min.          Nestor Phoenix MD  Department of Surgery

## 2017-04-25 NOTE — MR AVS SNAPSHOT
After Visit Summary   4/25/2017    Dinora Mcghee    MRN: [id number]           Patient Information     Date Of Birth          1965        Visit Information        Provider Department      4/25/2017 2:40 PM Nestor Phoenix MD [facility]        Today's Diagnoses     Abdominal wall pain    -  1    Pancreatic insufficiency        Post-pancreatectomy diabetes (H)        Iron deficiency anemia due to dietary causes           Follow-ups after your visit        Follow-up notes from your care team     Return in about 2 months (around 6/25/2017).      Your next 10 appointments already scheduled     May 12, 2017  7:30 AM CDT   (Arrive by 7:15 AM)   New Patient Visit with Jesse Richardson, [facility] ([facility])    [address] [place] [address]  [address] [facility] [address]   [phone]            May 31, 2017 10:00 AM CDT   (Arrive by 9:45 AM)   Return Visit with Maria Elena Abdalla,    [facility] ([facility])    [address] [place] [address]  [address] [facility] [address]   [phone]            Jun 19, 2017  7:00 AM CDT   (Arrive by 6:45 AM)   Return Visit with Sebastian López [facility] ([facility])    [address] [place] [address]   [phone]            Jun 20, 2017  8:30 AM CDT   LAB with  LAB   [facility] ([facility])    [address] [place] [address]  [address] [facility] [address]   [phone]           Patient must bring picture ID.  Patient should be prepared to give a urine specimen  Please do not eat 10-12 hours before your appointment if you are coming in fasting for labs on lipids, cholesterol, or glucose (sugar).  Pregnant women should follow their Care Team instructions. Water with medications is okay. Do not drink  coffee or other fluids.   If you have concerns about taking  your medications, please ask at office or if scheduling via Synthace, send a message by clicking on Secure Messaging, Message Your Care Team.            Jun 20, 2017  9:00 AM CDT   Nurse Visit with [facility] Nurse   [facility] [facility] ([facility])    [address] [place] [address]  3[address] [facility] [address] [phone]            Jun 20, 2017  2:00 PM CDT   (Arrive by 1:45 PM)   Return Auto Islet with Nestor Phoenix MD   [facility] ([facility])    [address] [place] [address]  [address] [facility] [id number] [phone]            Aug 14, 2017  7:45 AM CDT   Lab with  LAB   [facility] ([facility])    [address] [place] [id number] [phone]            Aug 14, 2017  8:50 AM CDT   (Arrive by 8:35 AM)   Return General Liver with Ara Lugo MD   [facility] ([facility])    [address] [place] [id number] [phone]              Who to contact     If you have questions or need follow up information about today's clinic visit or your schedule please contact [facility] directly at [phone].  Normal or non-critical lab and imaging results will be communicated to you by Cloudstaffhart, letter or phone within 4 business days after the clinic has received the results. If you do not hear from us within 7 days, please contact the clinic through Cloudstaffhart or phone. If you have a critical or abnormal lab result, we will notify you by phone as soon as possible.  Submit refill requests through Synthace or call your pharmacy and they will forward the refill request to us. Please allow 3 business days for your refill to be completed.          Additional Information About Your Visit        Synthace Information     Synthace gives you secure  "access to your electronic health record. If you see a primary care provider, you can also send messages to your care team and make appointments. If you have questions, please call your primary care clinic.  If you do not have a primary care provider, please call [phone] and they will assist you.        Care EveryWhere ID     This is your Care EveryWhere ID. This could be used by other organizations to access your [place] medical records  [id number]        Your Vitals Were     Pulse Temperature Respirations Height Pulse Oximetry BMI (Body Mass Index)    101 98.1  F (36.7  C) (Oral) 16 1.702 m (5' 7\") 99% 23.62 kg/m2       Blood Pressure from Last 3 Encounters:   04/25/17 114/78   04/17/17 107/67   04/10/17 101/62    Weight from Last 3 Encounters:   04/25/17 68.4 kg (150 lb 12.8 oz)   04/17/17 68.3 kg (150 lb 9.6 oz)   04/10/17 68.3 kg (150 lb 9.6 oz)              Today, you had the following     No orders found for display       Primary Care Provider Office Phone # Fax #    Justyna Lawson [phone]       [facility] [address] [facility] [address]  [place] [facility] [address]        Thank you!     Thank you for choosing [facility]  for your care. Our goal is always to provide you with excellent care. Hearing back from our patients is one way we can continue to improve our services. Please take a few minutes to complete the written survey that you may receive in the mail after your visit with us. Thank you!             Your Updated Medication List - Protect others around you: Learn how to safely use, store and throw away your medicines at www.disposemymeds.org.          This list is accurate as of: 4/25/17 11:59 PM.  Always use your most recent med list.                   Brand Name Dispense Instructions for use    amitriptyline 10 MG tablet    ELAVIL    60 tablet    Take 2 tablets (20 mg) by mouth At Bedtime       amylase-lipase-protease 94729 UNITS Cpep per EC capsule    CREON 24 "    180 capsule    Take 3-4 capsules by mouth with meals and 1-2 with snacks. Maximum 15 capsules per day.       aspirin 81 MG EC tablet     90 tablet    Take 1 tablet (81 mg) by mouth daily       BD SHARPS  Misc     1 each    1 Container as needed       blood glucose monitoring lancets     1 Box    Use to test blood sugar 6-8 times daily or as directed.       blood glucose monitoring test strip    PAT CONTOUR NEXT    200 strip    Use to test blood sugar 6-8 times daily or as directed.       BOOST HIGH PROTEIN Liqd      After above baseline labs are drawn, give: 6 mL/kg to maximum of 360 mL; the beverage is to be consumed within 5 minutes.       cetirizine 10 MG tablet    zyrTEC     Take 10 mg by mouth daily       CONTOUR NEXT EZ MONITOR W/DEVICE Kit      1 Device 6 times daily       cyclobenzaprine 5 MG tablet    FLEXERIL    42 tablet    Take 5-10 mg by mouth three times per day as needed for muscle spasms.       diphenhydrAMINE 25 MG capsule    BENADRYL ALLERGY    56 capsule    Take 1 capsule (25 mg) by mouth every 6 hours as needed for itching or allergies       docusate sodium 100 MG capsule    COLACE    60 capsule    Take 1 capsule (100 mg) by mouth 2 times daily as needed for constipation,       glucagon 1 MG kit    GLUCAGON EMERGENCY    1 mg    Inject 1 mg into the muscle once for 1 dose       glucose 40 % Gel gel     3 Tube    Take 15-30 g by mouth every 15 minutes as needed for low blood sugar       Injection Device for insulin Tika    NOVOPEN ECHO    1 each    1 each daily as needed       * insulin aspart 100 UNIT/ML injection    NovoLOG PEN     aspart medium correction 1 unit per 50 > 130 every 4 hours ? For Pre-Meal  - 179 give 1 unit.  For Pre-Meal  - 230 give 2 units.  For Pre-Meal  - 281 give 3 units.  For Pre-Meal  - 332 give 4 units.       * insulin aspart 100 UNIT/ML injection    NovoLOG PENFILL    3 mL    Administer subcutaneously 0.5 unit per 45 grams of  carbohydrates.       insulin pen needle 32G X 4 MM    BD KEN U/F    100 each    Use 6-8 times per day       LANsoprazole 30 MG CR capsule    PREVACID    30 capsule    Take 1 capsule (30 mg) by mouth daily Take 30-60 minutes before a meal.       levothyroxine 125 MCG tablet    SYNTHROID/LEVOTHROID    1 tablet    Take 1 tablet (125 mcg), by mouth daily before breakfast.       LORazepam 0.5 MG tablet    ATIVAN    20 tablet    Take 1 tablet (0.5 mg) by mouth every 6 hours as needed for anxiety       Lubiprostone 8 MCG Caps capsule     90 capsule    Take 1 capsule (8 mcg) by mouth 2 times daily. Take 1 capsule by mouth po BID x 5 days. If no effect, increase to 2 capsules po BID x 5 days. If no effect, increase to 3 capsules po BID and continue until further instruction.       multivitamin CF formula capsule    CHOICEFUL     Take 1 tablet by mouth daily       oxyCODONE 5 MG IR tablet    ROXICODONE    150 tablet    Take 1-2 tablets (5-10 mg) by mouth every 4 hours as needed for moderate to severe pain       polyethylene glycol Packet    MIRALAX/GLYCOLAX    7 packet    Take 17 g by mouth daily as needed for constipation       pregabalin 75 MG capsule    LYRICA    150 capsule    Take 1 capsule (75 mg) by mouth 2 times daily       prochlorperazine 5 MG tablet    COMPAZINE    90 tablet    Take two 5 mg tablets by mouth every six hours as needed for nausea.       senna-docusate 8.6-50 MG per tablet    SENOKOT-S;PERICOLACE    100 tablet    Take 1 tablet by mouth 2 times daily as needed for constipation       * Notice:  This list has 2 medication(s) that are the same as other medications prescribed for you. Read the directions carefully, and ask your doctor or other care provider to review them with you.

## 2017-04-25 NOTE — PROGRESS NOTES
Chronic Pancreatitis Group Progress Note    I had the pleasure of seeing Ms. Dinora Mcghee today. 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 Interval events since last visit with me:   ER visits = 0, reasons: n/a  Inpatient admissions = 0, reasons n/a  The patient reports their current symptoms to be:   GI function:   Nausea:   [x]  none    []  rare    []  often    []  daily  Comment:   Vomiting: [x]  none    []  rare    []  often    []  daily   Comment:   Last BM: mostly regular, now taking amitiza 24 mcg/day.  Miralax most days.  Stools are soft, frequency : mostly daily.  Very large volume.   Appetite: good  Oral food intake: 3 per day, with night time snack.  Pancreatic exocrine insufficiency:   Takes (Creon 24), 3-5 with meals, 1-2 with snacks.  Greasy stools: [x]  none   []  rarely    []  several times/wk   []  daily      Comment:   Diabetes management:   Long acting insulin: Lantus--5 units/day  Short acting insulin: Novolog--1-3 units/day  Blood sugars typically range from 91 to 166.   Lab Results   Component Value Date    A1C 5.6 04/04/2017    A1C 4.5 12/19/2016    A1C 4.5 11/29/2016      Pain management:   Pain rating (0=no pain, 10=unbearable): 0 (except arm)  Long acting agent: none.  Celebrex 100 mg BID. Lyrica BID  Short acting agent: none. Tylenol prn for shoulder.  Daily 'Uptime': most days up but no endurance.     Walking: distance 3 miles per day times per week  Prescription Medications as of 4/25/2017             cetirizine (ZYRTEC) 10 MG tablet Take 10 mg by mouth daily    LORazepam (ATIVAN) 0.5 MG tablet Take 1 tablet (0.5 mg) by mouth every 6 hours as needed for anxiety    Lubiprostone 8 MCG CAPS capsule Take 1 capsule (8 mcg) by mouth 2 times daily.  Take 1 capsule by mouth po BID x 5 days.  If no effect, increase to 2 capsules po BID x 5 days.  If no effect, increase to 3 capsules po BID and continue until further instruction.    insulin aspart (NOVOLOG  PENFILL) 100 UNIT/ML injection Administer subcutaneously 0.5 unit per 45 grams of carbohydrates.    Nutritional Supplements (BOOST HIGH PROTEIN) LIQD After above baseline labs are drawn, give: 6 mL/kg to maximum of 360 mL; the beverage is to be consumed within 5 minutes.    insulin glargine (LANTUS) 100 UNIT/ML injection Inject 4 units subcutaneously every morning.    cyclobenzaprine (FLEXERIL) 5 MG tablet Take 5-10 mg by mouth three times per day as needed for muscle spasms.    oxyCODONE (ROXICODONE) 5 MG IR tablet Take 1-2 tablets (5-10 mg) by mouth every 4 hours as needed for moderate to severe pain    amylase-lipase-protease (CREON 24) 97267 UNITS CPEP per EC capsule Take 3-4 capsules by mouth with meals and 1-2 with snacks. Maximum 15 capsules per day.    insulin aspart (NOVOLOG PEN) 100 UNIT/ML injection aspart medium correction 1 unit per 50 > 130 every 4 hours    For Pre-Meal  - 179 give 1 unit.   For Pre-Meal  - 230 give 2 units.   For Pre-Meal  - 281 give 3 units.   For Pre-Meal  - 332 give 4 units.    amitriptyline (ELAVIL) 10 MG tablet Take 2 tablets (20 mg) by mouth At Bedtime    senna-docusate (SENOKOT-S;PERICOLACE) 8.6-50 MG per tablet Take 1 tablet by mouth 2 times daily as needed for constipation    prochlorperazine (COMPAZINE) 5 MG tablet Take two 5 mg tablets by mouth every six hours as needed for nausea.    polyethylene glycol (MIRALAX/GLYCOLAX) Packet Take 17 g by mouth daily as needed for constipation    diphenhydrAMINE (BENADRYL ALLERGY) 25 MG capsule Take 1 capsule (25 mg) by mouth every 6 hours as needed for itching or allergies    levothyroxine (SYNTHROID/LEVOTHROID) 125 MCG tablet Take 1 tablet (125 mcg), by mouth daily before breakfast.    docusate sodium (COLACE) 100 MG capsule Take 1 capsule (100 mg) by mouth 2 times daily as needed for constipation,    multivitamin CF formula (CHOICEFUL) capsule Take 1 tablet by mouth daily    Blood Glucose Monitoring Suppl  (CONTOUR NEXT EZ MONITOR) W/DEVICE KIT 1 Device 6 times daily    pregabalin (LYRICA) 75 MG capsule Take 1 capsule (75 mg) by mouth 2 times daily    celecoxib (CELEBREX) 100 MG capsule Take 1 capsule (100 mg) by mouth 2 times daily    glucagon (GLUCAGON EMERGENCY) 1 MG kit Inject 1 mg into the muscle once for 1 dose    LANsoprazole (PREVACID) 30 MG CR capsule Take 1 capsule (30 mg) by mouth daily Take 30-60 minutes before a meal.    aspirin 81 MG EC tablet Take 1 tablet (81 mg) by mouth daily    Injection Device for insulin (NOVOPEN ECHO) MARCO 1 each daily as needed    insulin pen needle (BD KEN U/F) 32G X 4 MM Use 6-8 times per day    glucose 40 % GEL gel Take 15-30 g by mouth every 15 minutes as needed for low blood sugar    Sharps Container ([employer] SHARPS ) MISC 1 Container as needed    blood glucose monitoring ([employer] CONTOUR NEXT) test strip Use to test blood sugar 6-8 times daily or as directed.    blood glucose monitoring ([employer] MICROLET) lancets Use to test blood sugar 6-8 times daily or as directed.        Physical exam:   General Appearance: in no apparent distress.   Temp:  [98.1  F (36.7  C)] 98.1  F (36.7  C)  Pulse:  [101] 101  Resp:  [16] 16  BP: (114)/(78) 114/78  SpO2:  [99 %] 99 %   Skin: Normal, no rashes or jaundice  Heart: Regular rhythm.  Lungs: no audible wheezes or increased work of breathing.  Abdomen: The abdomen is rounded, and the wound is healing well, without hernia.  Tube exit site isclean. The abdomen is non-tender.  Edema: absent.    Chronic Pancreatitis Latest Ref Rng & Units 4/5/2017 4/10/2017 4/10/2017 4/17/2017 4/25/2017   Weight - - 68.312 kg 68.312 kg 68.312 kg 68.402 kg   AMYLASE 30 - 110 U/L - - - - -   LIPASE 73 - 393 U/L - - - - -   HEMOGLOBIN A1C 4.3 - 6.0 % - - - - -   C PEPTIDE 0.9 - 6.9 ng/mL 2.0 - - - -   GLUCOSE 70 - 99 mg/dL 109(H) - - - 111(H)   HGB 11.7 - 15.7 g/dL - - - - 11.1(L)    - 450 10e9/L - - - - 543(H)   ALBUMIN 3.4 - 5.0 g/dL - - - - 3.7    PREALBUMIN 15 - 45 mg/dL - - - - 16     Assessment and Plan: She is doing well s/p TP-IAT.  Interval progress has been good.  Pancreatic exocrine insufficiency: well controlled.  Malnutrition: not present  Diabetes mellitus: well controlled, making progress.  Abdominal pain management: not an issue. Will continue to work with physical therapy on shoulder pain.    Anxiety/Sleep: working with Maria Elena Abdalla on insomnia, with some recent progress.  Followup: I will see her again in clinic in 2 months    Total time: 25 min, Counselling Time: 20 min.          Nestor Phoenix MD  Department of Surgery

## 2017-04-27 ENCOUNTER — TELEPHONE (OUTPATIENT)
Dept: TRANSPLANT | Facility: CLINIC | Age: 52
End: 2017-04-27

## 2017-04-27 DIAGNOSIS — E89.1 POST-PANCREATECTOMY DIABETES (H): ICD-10-CM

## 2017-04-27 DIAGNOSIS — Z90.410 POST-PANCREATECTOMY DIABETES (H): ICD-10-CM

## 2017-04-27 DIAGNOSIS — E13.9 POST-PANCREATECTOMY DIABETES (H): ICD-10-CM

## 2017-04-27 DIAGNOSIS — K59.09 CHRONIC CONSTIPATION: ICD-10-CM

## 2017-04-27 RX ORDER — LUBIPROSTONE 24 UG/1
24 CAPSULE ORAL 2 TIMES DAILY WITH MEALS
Qty: 60 CAPSULE | Refills: 3 | Status: SHIPPED | OUTPATIENT
Start: 2017-04-27 | End: 2017-05-27

## 2017-04-27 NOTE — TELEPHONE ENCOUNTER
Called to let Dinora know that her Amitiza is ready for  at the [facility] pharmacy and that iron infusion orders have been faxed to [facility]. Dinora verbalized understanding.

## 2017-04-29 ASSESSMENT — ACTIVITIES OF DAILY LIVING (ADL): I_KEEP_THINKING_ABOUT_HOW_BADLY_I_WANT_THE_PAIN_TO_STOP: 2 = TO A MODERATE DEGREE

## 2017-05-01 ENCOUNTER — TELEPHONE (OUTPATIENT)
Dept: TRANSPLANT | Facility: CLINIC | Age: 52
End: 2017-05-01

## 2017-05-01 ENCOUNTER — OFFICE VISIT (OUTPATIENT)
Dept: ANESTHESIOLOGY | Facility: CLINIC | Age: 52
End: 2017-05-01

## 2017-05-01 DIAGNOSIS — F43.89 ADJUSTMENT REACTION TO CHRONIC STRESS: Primary | ICD-10-CM

## 2017-05-01 NOTE — MR AVS SNAPSHOT
After Visit Summary   5/1/2017    Dinora Mcghee    MRN: [id number]           Patient Information     Date Of Birth          1965        Visit Information        Provider Department      5/1/2017 6:00 PM Maria Elena Abdalla, PhD [facility]        Today's Diagnoses     Adjustment reaction to chronic stress    -  1       Follow-ups after your visit        Your next 10 appointments already scheduled     May 12, 2017  7:30 AM CDT   (Arrive by 7:15 AM)   New Patient Visit with Jesse Richardson [facility] Rheumatology ([facility])    [address] [place] [address]  3rd [facility] [address]   [phone]            Jun 19, 2017  7:00 AM CDT   (Arrive by 6:45 AM)   Return Visit with Sebastian López DO   [facility] ([facility])    [address] [place] [address]  4th [facility] [id number] [phone]            Jun 20, 2017  2:00 PM CDT   (Arrive by 1:45 PM)   Return Auto [place]let with Nestor Phoenix MD   [facility] Solid Organ Transplant ([facility])    [address] [place] [address]  3rd [facility] [id number] [phone]            Aug 14, 2017  7:45 AM CDT   Lab with  LAB   [facility] Lab ([facility])    [address] [place] [id number]   [phone]            Aug 14, 2017  8:50 AM CDT   (Arrive by 8:35 AM)   Return General Liver with Ara Lugo MD   [facility] Hepatology ([facility])    [address] [place] [address] [phone]              Who to contact     Please call your clinic at [phone] to:    Ask questions about your health    Make or cancel appointments    Discuss your medicines    Learn about your test results    Speak to your doctor   If you have compliments or concerns about an  experience at your clinic, or if you wish to file a complaint, please contact [facility] Patient Relations at [phone] or email us at [email or web address]         Additional Information About Your Visit        [employer]atial[employer] Information     [employer] gives you secure access to your electronic health record. If you see a primary care provider, you can also send messages to your care team and make appointments. If you have questions, please call your primary care clinic.  If you do not have a primary care provider, please call [phone] and they will assist you.      [email or web address] is an electronic gateway that provides easy, online access to your medical records. With [email or web address], you can request a clinic appointment, read your test results, renew a prescription or communicate with your care team.     To access your existing account, please contact your [facility] or call [phone] for assistance.        Care EveryWhere ID     This is your Care EveryWhere ID. This could be used by other organizations to access your [facility] medical records  [id number]         Blood Pressure from Last 3 Encounters:   04/25/17 114/78   04/17/17 107/67   04/10/17 101/62    Weight from Last 3 Encounters:   04/25/17 68.4 kg (150 lb 12.8 oz)   04/17/17 68.3 kg (150 lb 9.6 oz)   04/10/17 68.3 kg (150 lb 9.6 oz)              Today, you had the following     No orders found for display       Primary Care Provider Office Phone # Fax #    Justyna Lawson [phone]       [facility] [place] [address]  [facility] [address]        Thank you!     Thank you for choosing [facility]  for your care. Our goal is always to provide you with excellent care. Hearing back from our patients is one way we can continue to improve our services. Please take a few minutes to complete the written survey that you may receive in the mail after  your visit with us. Thank you!             Your Updated Medication List - Protect others around you: Learn how to safely use, store and throw away your medicines at www.disposemymeds.org.          This list is accurate as of: 5/1/17 11:59 PM.  Always use your most recent med list.                   Brand Name Dispense Instructions for use    amitriptyline 10 MG tablet    ELAVIL    60 tablet    Take 2 tablets (20 mg) by mouth At Bedtime       amylase-lipase-protease 50636 UNITS Cpep per EC capsule    CREON 24    180 capsule    Take 3-4 capsules by mouth with meals and 1-2 with snacks. Maximum 15 capsules per day.       aspirin 81 MG EC tablet     90 tablet    Take 1 tablet (81 mg) by mouth daily       BD SHARPS  Misc     1 each    1 Container as needed       blood glucose monitoring lancets     1 Box    Use to test blood sugar 6-8 times daily or as directed.       blood glucose monitoring test strip    PAT CONTOUR NEXT    200 strip    Use to test blood sugar 6-8 times daily or as directed.       BOOST HIGH PROTEIN Liqd      After above baseline labs are drawn, give: 6 mL/kg to maximum of 360 mL; the beverage is to be consumed within 5 minutes.       celecoxib 100 MG capsule    celeBREX    60 capsule    Take 1 capsule (100 mg) by mouth 2 times daily       cetirizine 10 MG tablet    zyrTEC     Take 10 mg by mouth daily       CONTOUR NEXT EZ MONITOR W/DEVICE Kit      1 Device 6 times daily       cyclobenzaprine 5 MG tablet    FLEXERIL    42 tablet    Take 5-10 mg by mouth three times per day as needed for muscle spasms.       diphenhydrAMINE 25 MG capsule    BENADRYL ALLERGY    56 capsule    Take 1 capsule (25 mg) by mouth every 6 hours as needed for itching or allergies       docusate sodium 100 MG capsule    COLACE    60 capsule    Take 1 capsule (100 mg) by mouth 2 times daily as needed for constipation,       glucagon 1 MG kit    GLUCAGON EMERGENCY    1 mg    Inject 1 mg into the muscle once for 1 dose        glucose 40 % Gel gel     3 Tube    Take 15-30 g by mouth every 15 minutes as needed for low blood sugar       Injection Device for insulin Tika    NOVOPEN ECHO    1 each    1 each daily as needed       * insulin aspart 100 UNIT/ML injection    NovoLOG PEN     aspart medium correction 1 unit per 50 > 130 every 4 hours ? For Pre-Meal  - 179 give 1 unit.  For Pre-Meal  - 230 give 2 units.  For Pre-Meal  - 281 give 3 units.  For Pre-Meal  - 332 give 4 units.       * insulin aspart 100 UNIT/ML injection    NovoLOG PENFILL    3 mL    Administer subcutaneously 0.5 unit per 45 grams of carbohydrates.       insulin glargine 100 UNIT/ML injection    LANTUS    3 mL    Inject 5 units subcutaneously every morning.       insulin pen needle 32G X 4 MM    BD KEN U/F    100 each    Use 6-8 times per day       LANsoprazole 30 MG CR capsule    PREVACID    30 capsule    Take 1 capsule (30 mg) by mouth daily Take 30-60 minutes before a meal.       levothyroxine 125 MCG tablet    SYNTHROID/LEVOTHROID    1 tablet    Take 1 tablet (125 mcg), by mouth daily before breakfast.       LORazepam 0.5 MG tablet    ATIVAN    20 tablet    Take 1 tablet (0.5 mg) by mouth every 6 hours as needed for anxiety       * Lubiprostone 8 MCG Caps capsule     90 capsule    Take 1 capsule (8 mcg) by mouth 2 times daily. Take 1 capsule by mouth po BID x 5 days. If no effect, increase to 2 capsules po BID x 5 days. If no effect, increase to 3 capsules po BID and continue until further instruction.       * lubiprostone 24 MCG capsule    AMITIZA    60 capsule    Take 1 capsule (24 mcg) by mouth 2 times daily (with meals)       multivitamin CF formula capsule    CHOICEFUL     Take 1 tablet by mouth daily       oxyCODONE 5 MG IR tablet    ROXICODONE    150 tablet    Take 1-2 tablets (5-10 mg) by mouth every 4 hours as needed for moderate to severe pain       polyethylene glycol Packet    MIRALAX/GLYCOLAX    7 packet    Take 17 g by mouth  daily as needed for constipation       pregabalin 75 MG capsule    LYRICA    150 capsule    Take 1 capsule (75 mg) by mouth 2 times daily       prochlorperazine 5 MG tablet    COMPAZINE    90 tablet    Take two 5 mg tablets by mouth every six hours as needed for nausea.       senna-docusate 8.6-50 MG per tablet    SENOKOT-S;PERICOLACE    100 tablet    Take 1 tablet by mouth 2 times daily as needed for constipation       * Notice:  This list has 4 medication(s) that are the same as other medications prescribed for you. Read the directions carefully, and ask your doctor or other care provider to review them with you.

## 2017-05-01 NOTE — TELEPHONE ENCOUNTER
This RN immediately called Dinora after reading her e-mail. Asked Dinora to check the prescription bottle and read back to me. She had not read the instructions and missed that the dosing was 24 mcg per capsule and not 8 mcg per capsule. She is feeling much better with no more diarrhea. She will skip the two doses today and start back up tomorrow. She stated that she was able to keep herself well hydrated. She verbally stated that she understands the dosage and frequency now. Dinora had no additional questions or concerns.      Ap Wing,    Sorry to write to tell you I had a terrible time increasing the Amitiza dose from 3 twice a day with food to 4 twice a day with food. Lots and lots of loose watery stools and horrible double me over abdominal pain as well as terrible joint pain and nausea. This started after the first increased dose and got increasingly worse with the 2nd and 3rd doses.    I dropped down to 3 capsules with food last night to titrate down but don't see a reason to keep at it with this drug. It did not eliminate those days where everything stops at the lower dose.    After all of your work to get this medication I feel terrible saying this but i want to stop taking it as it makes me sick.    My hope is now that I'm off the opiates and getting more active things may improve and the Colace, Senna, Milk of Mag & Mirilax Taylor will work.    Please advise if I should wean off or can just stop taking the Amatiza.    Thanks for your help and understanding.     Dinora

## 2017-05-02 ENCOUNTER — TELEPHONE (OUTPATIENT)
Dept: TRANSPLANT | Facility: CLINIC | Age: 52
End: 2017-05-02

## 2017-05-02 DIAGNOSIS — E89.1 POST-PANCREATECTOMY DIABETES (H): Primary | ICD-10-CM

## 2017-05-02 DIAGNOSIS — K86.89 PANCREATIC INSUFFICIENCY: ICD-10-CM

## 2017-05-02 DIAGNOSIS — E13.9 POST-PANCREATECTOMY DIABETES (H): Primary | ICD-10-CM

## 2017-05-02 DIAGNOSIS — Z90.410 POST-PANCREATECTOMY DIABETES (H): Primary | ICD-10-CM

## 2017-05-02 NOTE — TELEPHONE ENCOUNTER
Updated Dinora regarding her Celebrex management. Per Dr. Phoenix, she needs to contact Dr. López's office and request assistance with her Celebrex. Dinora verbalized understanding.

## 2017-05-02 NOTE — TELEPHONE ENCOUNTER
Received a call from Dinora regarding her Celebrex. She has not been able to get any refills from [facility]. This RN spoke with Rory at the [facility] [employer] in [place]. The last prescription was from 2/21/17. Dionra would like to stop taking the Celebrex. Will discuss with Dr. Phoenix and then contact Dinora.

## 2017-05-02 NOTE — PROGRESS NOTES
"[facility]   Comprehensive Pain Program   Psychology Progress Note    Patient Name: Dinora Mcghee    YOB: 1965   Medical Record Number: [id number]  Date: 5/2/2017              SUBJECTIVE         Psychology Department of Chronic Pain Clinic Return Patient Questionnaire  Psychology Department of Chronic Pain Clinic Return Patient Questionnaire 4/29/2017   Your exercise has been: better   Your imagery breathing practice has been: better   Your use of bilateral music (auditory EMDR) with headphones has been: regular   Your use of once a day guided imagery is: needs improvement   Your level of pain has been? better   Your activity level has been? better   Your stress level has been? better   Your sleep has been? slightly better               Interval history:  The ad koffi sleeping plan has allowed improved sleep.  6-8 hours     She had another discussion with former boss and they clarified she would not return.  And now she is considering options and trying not to hurry       OBJECTIVE:              Length of Visit:  60 minutes       Mental status: Marked Distress, Tearful and  During processing         Session objective:   Continued behavioral pain management skill improvment         Behavioral inventions and response:                            CBT  On career choices and decisions            Breathing Imagery   Along with                Resourced [place]/Eye position therapy [employer]  is fear of insomnia  And this led to fear of failure which led to large pocket of grief  \"my body failed me\"  And how do I trust it again                                   Resource spot  3 pt body resource   .          Beginning  SUDS =  4 up to 7              Ending SUDS  =  1                                                              ASSESSMENT          Diagnoses:                                F43.8      Other stress related disorder                                Psychosocial and Contextual Factors: " "improved         Progress toward goals: good.              Pain status since onset of pain services: improved           Emotional status since onset of pain services: improved       Medication / chemical use concerns: None        PLAN:               Next Appointment: Dinora Mcghee will schedule a follow-up appointment in 2-3 weeks.         Assignment: Establish a daily routine of stretching and exercise, Healthy breathing skills  for daily use, Utilize  Guided imagery  \"EASE  PAIN\"  at least once a day and auditory EMDR         Objectives / interventions for next session:          Improve pain management skills: Goals include:  Constructively grieve losses which include  Trusting her body and herself               Maria Elena Abdalla, Ph.D., L.P. ...............5/2/2017 10:03 AM                Medical Psychologist                       "

## 2017-05-03 DIAGNOSIS — G89.18 ACUTE POST-OPERATIVE PAIN: ICD-10-CM

## 2017-05-03 RX ORDER — CELECOXIB 100 MG/1
100 CAPSULE ORAL 2 TIMES DAILY
Qty: 60 CAPSULE | Refills: 1 | Status: SHIPPED | OUTPATIENT
Start: 2017-05-03 | End: 2017-08-08

## 2017-05-03 NOTE — TELEPHONE ENCOUNTER
"Call back to pt regarding medication refill and pain management. Pt reported \"feeling great since yesterday\" and inquired about needing to continue celebrex as scheduled medication versus PRN.  RN care coordinator spoke with Dr. López who advised pt to use medication PRN and authorized RN to refill medication.  Pt updated by RN with Dr. López's recommendations. Authorization for 2 refills sent electronically to pt's pharmacy.  Pt advised to contact clinic with questions or concerns.     Ivy Albarran RN, BSN     "

## 2017-05-04 ENCOUNTER — PRE VISIT (OUTPATIENT)
Dept: RHEUMATOLOGY | Facility: CLINIC | Age: 52
End: 2017-05-04

## 2017-05-04 NOTE — TELEPHONE ENCOUNTER
1.  Date/reason for appt:  5/12/17   Dry Eyes/Mouth; Elevated IgG4    2.  Referring provider:  Dr Lugo    3.  Call to patient (Yes / No - short description):  No, established pt; referred.    Records reviewed.  All records are in [facility] and imaging is in PACS.

## 2017-05-12 ENCOUNTER — OFFICE VISIT (OUTPATIENT)
Dept: RHEUMATOLOGY | Facility: CLINIC | Age: 52
End: 2017-05-12
Attending: INTERNAL MEDICINE
Payer: COMMERCIAL

## 2017-05-12 VITALS
HEIGHT: 68 IN | HEART RATE: 92 BPM | WEIGHT: 153 LBS | SYSTOLIC BLOOD PRESSURE: 103 MMHG | OXYGEN SATURATION: 98 % | DIASTOLIC BLOOD PRESSURE: 62 MMHG | TEMPERATURE: 97.8 F | BODY MASS INDEX: 23.19 KG/M2

## 2017-05-12 DIAGNOSIS — D89.84 IGG4-RELATED SCLEROSING DISEASE (H): ICD-10-CM

## 2017-05-12 DIAGNOSIS — M35.00 SICCA SYNDROME (H): Primary | ICD-10-CM

## 2017-05-12 PROCEDURE — 99212 OFFICE O/P EST SF 10 MIN: CPT | Mod: ZF

## 2017-05-12 ASSESSMENT — PAIN SCALES - GENERAL: PAINLEVEL: WORST PAIN (10)

## 2017-05-12 NOTE — PROGRESS NOTES
Rheumatology Clinic Visit-Fellow Note     Dinora Mcghee MRN# [id number]   YOB: 1965 Age: 52 year old     Date of Visit: 05/12/2017  Primary care provider: Justyna Lawson  Referring Provider: Dr. Ara Lugo with GI  Reason for Referral: Further evaluation and management for possible Sjogren's vs IgG4 related disease in patient with sicca symptoms and evidence of isolated? IgG4 in liver/pancreas.           Assessment and Plan:   Diagnosis:  # Sicca Symptoms  # Elevated transaminases with Hepatic Dome Lesion (improving)  # Suspicious for IgG4 related disease, IgG4 elevation in serum and on liver biopsy (> 60 HPF)  # Chronic Pancreatitis s/p pancreatectomy with islet autotransplant 12/2016.   # h/o endometriosis  # h/o pituitary mass with 2/2 DI now s/p transsphenoidal resection in 1990    Discussion:  51 y/o female with longstanding history of chronic pancreatitis s/p pancreatectomy with islet autotransplant 12/2016 who is overall improving but found to have elevated serum and > 60 HPF cells on liver biopsy staining. She has sicca symptoms for the past 5 years and this brings up the possibility of Sjogren's vs IgG4 related disease. No other evidence of IgG4 related activity with no RPF, aortitis, orbital disease, thyroid, pulmonary, or kidney disease. Interestingly, IgG4 disease can impact the hypophysis and she does have a history of pituitary issues back in 1990. Recent meta analysis assessing IgG4 serum sensitivity is 87.2% with 82% specificity (1). Her biopsy containing > 50 IgG4 staining plasma cells is highly suggestive of IgG4 related disease as well and meets recent proposed pathologic recommendations of IgG4 related disease (2). She meets many of the features for IgG4 related disease and given her largely unremarkable Sjogren's serologic evaluation we are most suspicious for IgG4 related disease as her primary etiology of her sicca symptoms. Unfortunately, there is a significant  percentage of patients who are seronegative for Sjogren's and thus further evaluation with lip biopsy is warranted.     1. Daniel Barber et al.  Diagnostic Value of Serum IgG4 for IgG4-Related Disease: A MADELYN-Compliant Systematic Review and [place]-Analysis.  Ed. Mattykelsey Quinn. Medicine 95.21 (2016): e3785. [employer] [facility]. Web. 17 May 2017.  2. Annabel V, Ever Y, Donovan JK, et al. Consensus statement on the pathology of IgG4-RD. Mod Pathol 2012;64:3061-7.    Plan:  -- recommend ENT lip Biopsy with Dian Armijo (stable symptoms no non urgent) to further stain/assess for IgG4 related disease vs Sjogren's (less likely).  -- therapeutic options for IgG4 related disease would normally be Rituximab preferred (but has steroids with infusion) vs MMF (no steroids but not ideal agent).  -- will need to coordinate with GI regarding if isolated GI manifestations or addition of sicca enough to treat vs watching given the SE of the therapies.   -- recommend Hep B core Ab and quantiferon gold in anticipation of possible Rituximab use  -- Patient deferred initiating Evoxac or restasis at this point in time.  -- discussed various lozenges that help sicca symptoms    Follow up:  RTC in 3 months, after lip biopsy and discussion with GI.    Prevention:  Pneumovax PPSV23: 12/2016  PCV13 (Prevnar): not in [facility]  Tdap: 1/7/2008?  Shingles vaccine: not recieved  Reminded if on biologic no live vaccine (flu mist, shingles): n/a  HBVsAg: negative  HBVcore: ordered  HCV: negative   Q gold (or T spot): ordered  Bactrim prophylaxis: n/a  Fungal prophylaxis: n/a  Vitamin D and calcium/bone health: Ca and Vitamin D    Immunizations:  Immunization History   Administered Date(s) Administered     HIB 12/01/2016     Influenza (IIV3) 09/29/2011, 11/15/2014, 10/21/2016     Influenza (intradermal) 11/01/2003, 09/29/2011     Influenza Vaccine IM 3yrs+ 4 Valent IIV4 10/24/2015, 10/21/2016     Influenza Vaccine, 3 YRS +, IM (QUADRIVALENT W/PRESERVATIVES) 11/04/2013      Meningococcal (Bexsero ) 12/09/2016     Meningococcal (Menactra ) 12/01/2016     Pneumococcal 23 valent 12/01/2016     TD (ADULT, 7+) 07/09/1998     TDAP Vaccine (Adacel) 01/07/2008     Discussed with attending Dr. Becerra, who agrees with the above assessment and plan.    Jesse Richardson DO  Rheumatology Fellow  Pager: [phone]    Orders Placed This Encounter   Procedures     Hepatitis B core antibody     M Tuberculosis by Quantiferon Gold     OTOLARYNGOLOGY REFERRAL     Attending Note: I saw and evaluated the patient with Dr. Richardson. I agree with the assessment and plan.    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  Document previously scanned on 1/12/17.  Validation form completed and scanned.  Code Status reflects choices in most recent ACP document..  Confirmed/documented designated decision maker(s).  Added by May Baires   Advance Care Planning Liaison               Acute pancreatitis 06/03/2015     Abdominal pain  "06/02/2015     S/P ERCP 06/02/2015     History of ERCP 04/20/2015     Other type of intractable migraine      Diagnosis updated by automated process. Provider to review and confirm.       GERD (gastroesophageal reflux disease) 12/01/2010     Lumbago 04/18/2005     History of L5-S1 degenerative disk disease.         Sprain and strain of other specified sites of hip and thigh 12/09/2002     Chondromalacia of patella 12/09/2002     Need for prophylactic immunotherapy      trees, grass, srw, dust mites, cat       Allergic rhinitis due to other allergen 12/21/2001     Hypothyroidism      Problem list name updated by automated process. Provider to review       Sensorineural hearing loss 01/10/2005     Problem list name updated by automated process. Provider to review       Vertiginous syndrome and labyrinthine disorder 01/10/2005     Problem list name updated by automated process. Provider to review              History of Present Illness:   Dinora Mcghee is a 52 year old female with a past medical history of hypothyroidism, h/o pituitary mass and secondary DI s/p transphenoidal resection 1990, HLD, left knee OA, lumbar DDD, chronic pancreatitis for 30 years, now s/p islet cell transplantation who presents today for further evaluation of elevated IGG4 found both in serum and liver biopsy as well as sicca symptoms.     She notes that she has had longstanding issues with pancreatitis without any mention of aortitis, RPF, urinary obstruction. She denies any history of cancers, lymphoma or leukemia. No family history of autoimmune conditions other than possible RA in her dad. She had distant DI secondary from pituitary mass (s/p removal and not needing hormones) and joint wise has longstanding OA of left knee. She mentions that for the past 5 years she has had dry eye requiring eye drops. Sometimes related to her allergies she has to now regularly use eye drops. \"Feels like sand\" is rubbing in eyes. She also noticed ~ 5 " years ago dry mouth where a cracker would not dissolve. She has to constantly carry water and drink it to keep her mouth dry. She uses sugar free gum and lozenges. Most of her clinical symptom burden has been related to her pancreas with frequent ERCP and concern for Sphincter of Oddi dysfunction. She had issues with significant pancreatic insuffiencey with malnutrition and weight loss. She underwent islet cell autotransplantation 12/28/2017 and was found to have elevated IgG4 cells in her liver biopsy. Subsequent IgG4 levels were elevated            Review of Systems:   Constitutional: denies fevers/chills, fatigue, patient is having weight gain s/p transplant.  Skin: denies rash, raynauds or alopecia  Eyes: denies hx of uvetitis, double vision, positive dry eyes  Ears/Nose/Throat:  denies recurrent URI or sinusitis, positive dry mouth, denies any oral or nasal ulcers  Respiratory: No shortness of breath, dyspnea on exertion, cough, or hemoptysis  Cardiovascular:  denies chest pain, palpitations, hx pleurisy  Gastrointestinal: denies diarrhea, blood in stool, hx of IBD, positive past pancreatitis.   Genitourinary: denies hematuria. Denies any RPF.   Musculoskeletal: denies red, tender, swollen joints, left shoulder frozen shoulder since surgery.   Neurologic:  denies headaches, seizures, some numbness or tingling s/p abdominal surgery.  Psychiatric:  denies history of depression or mental illness  Hematologic/Lymphatic/Immunologic:  denies history of blood clots, easy bruising, bleeding.  Endocrine:  Positive hypothyroidism.          Past Medical History:     Past Medical History:   Diagnosis Date     Allergic rhinitis, cause unspecified      Allergy to other foods     strawberries, apples, celeries, alice, watermelon     Arthritis     left knee     Choledocholithiasis     long after cholecystectomy     Chronic abdominal pain      Chronic constipation      Chronic nausea      Chronic pancreatitis (H)       Degeneration of lumbar or lumbosacral intervertebral disc      Esophageal reflux     w/ hiatal hernia     Gastroparesis      Hiatal hernia      History of pituitary adenoma     s/p resection     Hypothyroidism      Migraines      Mild hyperlipidemia      On tube feeding diet     presence of GJ tube     Pancreatic disease     PD stricture, 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    Past Surgical History  Past Surgical History:   Procedure Laterality Date     ABDOMEN SURGERY      c sections: 93, 96, 98. endometriosis growth     APPENDECTOMY       C  DELIVERY ONLY       C  DELIVERY ONLY      repeat c section with incidental cystotomy with repair     C EXCIS PITUITARY,TRANSNASAL/SEPTAL      pituitary tumor removed for diabetes insipidus     C TOTAL ABDOM HYSTERECTOMY      w/ bilateral salpingoophorectomy      C WRIST ARTHROSCOP,RELEASE XVERS LIG Bilateral 08      SECTION       COLONOSCOPY       ENDOSCOPIC RETROGRADE CHOLANGIOPANCREATOGRAM N/A 2015    Procedure: ENDOSCOPIC RETROGRADE CHOLANGIOPANCREATOGRAM;  Surgeon: Mandeep Park MD;  Location: [facility]     ENDOSCOPIC RETROGRADE CHOLANGIOPANCREATOGRAM N/A 2016    Procedure: COMBINED ENDOSCOPIC RETROGRADE CHOLANGIOPANCREATOGRAPHY, PLACE TUBE/STENT;  Surgeon: Mandeep Park MD;  Location: [facility]     ENDOSCOPIC RETROGRADE CHOLANGIOPANCREATOGRAM N/A 3/17/2016    Procedure: COMBINED ENDOSCOPIC RETROGRADE CHOLANGIOPANCREATOGRAPHY, REMOVE FOREIGN BODY OR STENT/TUBE;  Surgeon: Mandeep Park MD;  Location: [facility]     ENDOSCOPIC RETROGRADE CHOLANGIOPANCREATOGRAM N/A 2016    Procedure: COMBINED ENDOSCOPIC RETROGRADE CHOLANGIOPANCREATOGRAPHY, PLACE TUBE/STENT;  Surgeon: Mandeep Park MD;  Location: [facility]     ENDOSCOPIC  RETROGRADE CHOLANGIOPANCREATOGRAM N/A 8/26/2016    Procedure: COMBINED ENDOSCOPIC RETROGRADE CHOLANGIOPANCREATOGRAPHY, REMOVE FOREIGN BODY OR STENT/TUBE;  Surgeon: Mandeep Park MD;  Location: [facility]     ENDOSCOPIC ULTRASOUND UPPER GASTROINTESTINAL TRACT (GI) N/A 10/3/2016    Procedure: ENDOSCOPIC ULTRASOUND, ESOPHAGOSCOPY / UPPER GASTROINTESTINAL TRACT (GI);  Surgeon: Guru Jose Rodas MD;  Location: [facility]     ESOPHAGOSCOPY, GASTROSCOPY, DUODENOSCOPY (EGD), COMBINED N/A 6/24/2015    Procedure: COMBINED ESOPHAGOSCOPY, GASTROSCOPY, DUODENOSCOPY (EGD), REMOVE FOREIGN BODY;  Surgeon: Mandeep Park MD;  [facility]: [facility]     ESOPHAGOSCOPY, GASTROSCOPY, DUODENOSCOPY (EGD), COMBINED N/A 10/25/2015    Procedure: COMBINED ESOPHAGOSCOPY, GASTROSCOPY, DUODENOSCOPY (EGD);  Surgeon: Sammy Amaro MD;  [facility]: [facility]     ESOPHAGOSCOPY, GASTROSCOPY, DUODENOSCOPY (EGD), COMBINED N/A 10/25/2015    Procedure: COMBINED ESOPHAGOSCOPY, GASTROSCOPY, DUODENOSCOPY (EGD), BIOPSY SINGLE OR MULTIPLE;  Surgeon: Sammy Amaro MD;  Location: [facility]     ESOPHAGOSCOPY, GASTROSCOPY, DUODENOSCOPY (EGD), DILATATION, COMBINED       EXCISE LESION TRUNK N/A 4/17/2017    Procedure: EXCISE LESION TRUNK;  Removal of Abdominal Foreign Body;  Surgeon: Nestor Phoenix MD;  [facility]     HC ESOPH/GAS REFLUX TEST W NASAL IMPED >1 HR N/A 11/19/2015    Procedure: ESOPHAGEAL IMPEDENCE FUNCTION TEST WITH 24 HOUR PH GREATER THAN 1 HOUR;  Surgeon: Thiago Apple MD;  Location: [facility]     HC [facility] ENDOSCOPY DIAG W BIOPSY  9/17/08     HC UGI ENDOSCOPY DIAG W BIOPSY  9/27/12     HC UGI ENDOSCOPY W ESOPHAGEAL DILATION BALLOON <30MM  9/17/08     HC UGI ENDOSCOPY W EUS N/A 5/5/2015    Procedure: COMBINED ENDOSCOPIC ULTRASOUND, ESOPHAGOSCOPY, GASTROSCOPY, DUODENOSCOPY (EGD);  Surgeon: Wm Dueñas MD;  Location: [facility]     INJECT TRANSVERSUS ABDOMINIS PLANE (TAP) BLOCK BILATERAL Left 9/22/2016    Procedure:  INJECT TRANSVERSUS ABDOMINIS PLANE (TAP) BLOCK BILATERAL;  Surgeon: Dickson Corrigan MD;  [facility]: [facility]     laparoscopic pineda  1995     LAPAROSCOPIC PANCREATECTOMY, TRANSPLANT AUTO ISLET CELL N/A 12/28/2016    Procedure: LAPAROSCOPIC PANCREATECTOMY, TRANSPLANT AUTO ISLET CELL;  Surgeon: Nestor Phoenix MD;  Location: [facility]     transphenoidal pituitary resection  1990            Social History:     Social History     Occupational History     director [employer]     Social History Main Topics     Smoking status: Former Smoker     Packs/day: 0.50     Years: 6.00     Types: Cigarettes     Start date: 9/1/1985     Quit date: 1/1/1992     Smokeless tobacco: Never Used      Comment: no 2nd hand     Alcohol use No      Comment: last drink 6/2016  - moderate social     Drug use: No     Sexual activity: Yes     Partners: Male     Birth control/ protection: None      Comment: Hystectomy 1999   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 6 years of smoking and quit in 1992. Denies any ETOH.  and lives in the [facility].          Family History:     Family History   Problem Relation Age of Onset     Eye Disorder Father      cataract, detached retina     Myocardial Infarction Father 60     Lipids Father      CEREBROVASCULAR DISEASE Father      Depression Father      Substance Abuse Father      Anesthesia Reaction Father      stroke right after surgery     Lipids Mother      Hypertension Mother      Thyroid Disease Mother      Eye Disorder Son      ptosis     Eye Disorder Paternal Grandmother      cataract     Eye Disorder Paternal Grandfather      cataract     DIABETES Paternal Grandfather      Eye Disorder Maternal Grandmother      cataract     Thyroid Disease Maternal Grandmother      Coronary Artery Disease Sister      Depression Sister      Depression Son      Anxiety Disorder Son      Thyroid Disease Sister      DIABETES Maternal Grandfather      Depression Nephew      Anxiety Disorder Nephew       Thyroid Disease Nephew      Type 2 Diabetes Cousin      paternal cousin     Father with likely RA following with multiple rheumatologists. 3 children healthy. 1 sister with thyroid issues. Denies any Sjogren's SLE, Scleroderma.            Allergies:     Allergies   Allergen Reactions     Apple Anaphylaxis     Depakote [Divalproex Sodium] Other (See Comments)     Chest pain     Zithromax [Azithromycin Dihydrate] Anaphylaxis     Noted in 4/7/08 ER     Darvocet [Propoxyphene N-Apap] Itching     Morphine Nausea and Vomiting and Rash     Nalbuphine Hcl Rash     RASH :nubaine     Beacon Hives     Zosyn [Piperacillin-Tazobactam In D5w] Rash     Possible allergy, did have a diffuse rash that seemed drug related but could have also been related to soap in the hospital.      Bactrim [Sulfamethoxazole W-Trimethoprim] Other (See Comments) and Nausea and Vomiting     Severely low liver function.     Cats      Compazine [Prochlorperazine] Other (See Comments)     Twitching. Takes Benedryl and is fine     Corticosteroids Other (See Comments)     All oral,IV and injectable steroids are contraindicated (unless in life threatening situations) in Islet Auto transplant recipients. They can cause irreversible loss of islet cell function. Please contact patients transplant care coordinator Mecca ANGEL @ [phone]/Pager [phone], and Endocrinologist prior to administration.     Dust Mites      Grass      Prednisone Other (See Comments)     Insomnia       Ragweeds      Tape [Adhesive Tape] Blisters     Trees      Zofran [Ondansetron] Other (See Comments)     migraine            Medications:     Current Outpatient Prescriptions   Medication Sig Dispense Refill     Nutritional Supplements (BOOST HIGH PROTEIN) LIQD After above baseline labs are drawn, give: 6 mL/kg to maximum of 360 mL; the beverage is to be consumed within 5 minutes.  0     lubiprostone (AMITIZA) 24 MCG capsule Take 1 capsule (24 mcg) by mouth 2 times  daily (with meals) 60 capsule 3     insulin glargine (LANTUS) 100 UNIT/ML injection Inject 5 units subcutaneously every morning. 3 mL 3     cetirizine (ZYRTEC) 10 MG tablet Take 10 mg by mouth daily       LORazepam (ATIVAN) 0.5 MG tablet Take 1 tablet (0.5 mg) by mouth every 6 hours as needed for anxiety 20 tablet 0     Lubiprostone 8 MCG CAPS capsule Take 1 capsule (8 mcg) by mouth 2 times daily.  Take 1 capsule by mouth po BID x 5 days.  If no effect, increase to 2 capsules po BID x 5 days.  If no effect, increase to 3 capsules po BID and continue until further instruction. 90 capsule 3     insulin aspart (NOVOLOG PENFILL) 100 UNIT/ML injection Administer subcutaneously 0.5 unit per 45 grams of carbohydrates. 3 mL 3     Nutritional Supplements (BOOST HIGH PROTEIN) LIQD After above baseline labs are drawn, give: 6 mL/kg to maximum of 360 mL; the beverage is to be consumed within 5 minutes.  0     cyclobenzaprine (FLEXERIL) 5 MG tablet Take 5-10 mg by mouth three times per day as needed for muscle spasms. 42 tablet 3     amylase-lipase-protease (CREON 24) 19789 UNITS CPEP per EC capsule Take 3-4 capsules by mouth with meals and 1-2 with snacks. Maximum 15 capsules per day. 180 capsule 3     insulin aspart (NOVOLOG PEN) 100 UNIT/ML injection aspart medium correction 1 unit per 50 > 130 every 4 hours    For Pre-Meal  - 179 give 1 unit.   For Pre-Meal  - 230 give 2 units.   For Pre-Meal  - 281 give 3 units.   For Pre-Meal  - 332 give 4 units.  3     amitriptyline (ELAVIL) 10 MG tablet Take 2 tablets (20 mg) by mouth At Bedtime 60 tablet 3     senna-docusate (SENOKOT-S;PERICOLACE) 8.6-50 MG per tablet Take 1 tablet by mouth 2 times daily as needed for constipation 100 tablet      prochlorperazine (COMPAZINE) 5 MG tablet Take two 5 mg tablets by mouth every six hours as needed for nausea. 90 tablet 3     polyethylene glycol (MIRALAX/GLYCOLAX) Packet Take 17 g by mouth daily as needed for  constipation 7 packet 11     diphenhydrAMINE (BENADRYL ALLERGY) 25 MG capsule Take 1 capsule (25 mg) by mouth every 6 hours as needed for itching or allergies 56 capsule 0     levothyroxine (SYNTHROID/LEVOTHROID) 125 MCG tablet Take 1 tablet (125 mcg), by mouth daily before breakfast. 1 tablet 0     docusate sodium (COLACE) 100 MG capsule Take 1 capsule (100 mg) by mouth 2 times daily as needed for constipation, 60 capsule 0     multivitamin CF formula (CHOICEFUL) capsule Take 1 tablet by mouth daily  11     Blood Glucose Monitoring Suppl (CONTOUR NEXT EZ MONITOR) W/DEVICE KIT 1 Device 6 times daily       pregabalin (LYRICA) 75 MG capsule Take 1 capsule (75 mg) by mouth 2 times daily 150 capsule 3     LANsoprazole (PREVACID) 30 MG CR capsule Take 1 capsule (30 mg) by mouth daily Take 30-60 minutes before a meal. 30 capsule 11     aspirin 81 MG EC tablet Take 1 tablet (81 mg) by mouth daily 90 tablet 3     Injection Device for insulin (NOVOPEN ECHO) MARCO 1 each daily as needed 1 each 0     insulin pen needle ([employer] KEN U/F) 32G X 4 MM Use 6-8 times per day 100 each 3     glucose 40 % GEL gel Take 15-30 g by mouth every 15 minutes as needed for low blood sugar 3 Tube 2     Sharps Container (BD SHARPS ) MISC 1 Container as needed 1 each 1     blood glucose monitoring ([employer] CONTOUR NEXT) test strip Use to test blood sugar 6-8 times daily or as directed. 200 strip prn     blood glucose monitoring (PAT MICROLET) lancets Use to test blood sugar 6-8 times daily or as directed. 1 Box prn     celecoxib (CELEBREX) 100 MG capsule Take 1 capsule (100 mg) by mouth 2 times daily (Patient not taking: Reported on 5/12/2017) 60 capsule 1     oxyCODONE (ROXICODONE) 5 MG IR tablet Take 1-2 tablets (5-10 mg) by mouth every 4 hours as needed for moderate to severe pain (Patient not taking: Reported on 4/10/2017) 150 tablet 0     glucagon (GLUCAGON EMERGENCY) 1 MG kit Inject 1 mg into the muscle once for 1 dose 1 mg 1             "Physical Exam:   Height 1.727 m (5' 8\"), weight 69.4 kg (153 lb), not currently breastfeeding.  Wt Readings from Last 4 Encounters:   05/12/17 69.4 kg (153 lb)   04/25/17 68.4 kg (150 lb 12.8 oz)   04/17/17 68.3 kg (150 lb 9.6 oz)   04/10/17 68.3 kg (150 lb 9.6 oz)     Ideal body weight: 63.9 kg (140 lb 14 oz)  Adjusted ideal body weight: 66.1 kg (145 lb 11.6 oz)    Constitutional: well-developed, appearing stated age; cooperative  Eyes: nl EOM, conjunctiva, sclera  ENT: nl external ears, nose, lips, teeth, gums, throat  No mucous membrane lesions, dry saliva pool, no parotid enlargement  Neck: no mass or thyroid enlargement  Resp: lungs clear to auscultation, nl to palpation  CV: RRR, no murmurs, rubs or gallops, no edema  GI: no ABD mass or tenderness  Lymph: no cervical, supraclavicular, epitrochlear nodes    MS: All TMJ, neck, shoulder, elbow, wrist, MCP/PIP/DIP, spine, hip, knee, ankle, and foot MTP/IP joints were examined.     No dactylitis,  tenosynovitis, enthesopathy  Soft tissue exam - No specific tenderness to palpation of soft tissue points on examination today   Neck - normal range of motion  Shoulder -  Right shoulder with normal ROM in full abduction/adduction and internal/external rotation without pain. Left shoulder with decreased abduction and IR and ER on both passive and active ROM. No glenohumeral effusion/warmth, no tenderness bilaterally. SC joints without effusion or tenderness on palpation.  Elbow - full ROM and no joint effusion on exam; nontender bilaterally   Wrist - full ROM and no joint effusion on exam; nontender bilaterally  Hand - T0S0 of bilateral DIPs, PIPs, MCP joints. Normal hand- without pain.   Back - no specific spinal or paraspinal tenderness present  Pelvic - no tenderness along the SI joints, bilaterally.  Hip - full ROM; nontender bilaterally  Knee - no joint effusion or joint line tenderness on exam; normal range of motion.  Ankle -  Left and right ankle joint lines " without swelling or tenderness. Non-tender to palpation  Foot - no focal pain or synovitis on palpation of the MTPs bilaterally    Skin: no nail pitting, alopecia, rash, nodules or lesions  Neuro: nl cranial nerves, strength in both proximal and distal UE and LE.   Psych: nl judgement, orientation, memory, affect.         Data:     Results for orders placed or performed in visit on 04/25/17   CBC with platelets differential   Result Value Ref Range    WBC 4.4 4.0 - 11.0 10e9/L    RBC Count 4.38 3.8 - 5.2 10e12/L    Hemoglobin 11.1 (L) 11.7 - 15.7 g/dL    Hematocrit 36.1 35.0 - 47.0 %    MCV 82 78 - 100 fl    MCH 25.3 (L) 26.5 - 33.0 pg    MCHC 30.7 (L) 31.5 - 36.5 g/dL    RDW 17.5 (H) 10.0 - 15.0 %    Platelet Count 543 (H) 150 - 450 10e9/L    Diff Method Automated Method     % Neutrophils 26.7 %    % Lymphocytes 44.5 %    % Monocytes 15.1 %    % Eosinophils 11.2 %    % Basophils 2.5 %    % Immature Granulocytes 0.0 %    Nucleated RBCs 0 0 /100    Absolute Neutrophil 1.2 (L) 1.6 - 8.3 10e9/L    Absolute Lymphocytes 2.0 0.8 - 5.3 10e9/L    Absolute Monocytes 0.7 0.0 - 1.3 10e9/L    Absolute Eosinophils 0.5 0.0 - 0.7 10e9/L    Absolute Basophils 0.1 0.0 - 0.2 10e9/L    Abs Immature Granulocytes 0.0 0 - 0.4 10e9/L    Absolute Nucleated RBC 0.0    Comprehensive metabolic panel   Result Value Ref Range    Sodium 142 133 - 144 mmol/L    Potassium 4.4 3.4 - 5.3 mmol/L    Chloride 107 94 - 109 mmol/L    Carbon Dioxide 31 20 - 32 mmol/L    Anion Gap 4 3 - 14 mmol/L    Glucose 111 (H) 70 - 99 mg/dL    Urea Nitrogen 16 7 - 30 mg/dL    Creatinine 0.62 0.52 - 1.04 mg/dL    GFR Estimate >90  Non  GFR Calc   >60 mL/min/1.7m2    GFR Estimate If Black >90   GFR Calc   >60 mL/min/1.7m2    Calcium 9.3 8.5 - 10.1 mg/dL    Bilirubin Total 0.9 0.2 - 1.3 mg/dL    Albumin 3.7 3.4 - 5.0 g/dL    Protein Total 7.7 6.8 - 8.8 g/dL    Alkaline Phosphatase 363 (H) 40 - 150 U/L    ALT 85 (H) 0 - 50 U/L    AST 58 (H)  0 - 45 U/L   Prealbumin   Result Value Ref Range    Prealbumin 16 15 - 45 mg/dL       Recent Labs   Lab Test  04/25/17   1355  04/04/17   0757  02/21/17   1315   12/29/16   0620   06/06/15   1903   10/23/12   1451   WBC  4.4  5.0  4.7   < >  10.7   < >   --    < >   --    RBC  4.38  4.04  4.28   < >  3.13*   < >   --    < >   --    HGB  11.1*  10.4*  11.6*   < >  9.1*   < >   --    < >   --    HCT  36.1  33.4*  37.1   < >  27.7*   < >   --    < >   --    MCV  82  83  87   < >  89   < >   --    < >   --    RDW  17.5*  15.8*  13.0   < >  13.2   < >   --    < >   --    PLT  543*  482*  733*   < >  107*   < >   --    < >   --    ALBUMIN  3.7  3.2*  3.7   < >  2.9*   < >  2.8*   < >  4.4   CRP   --    --    --    --   90.0*   --   71.0*   --   7.2   BUN  16  17  13   < >  7   < >  2*   < >   --     < > = values in this interval not displayed.      Recent Labs   Lab Test  12/20/16   1121  10/23/15   1554  10/09/13   0721   06/25/09   1423   TSH  0.13*  0.57  1.86   < >  0.37*   T4  1.17   --    --    --   1.17    < > = values in this interval not displayed.     Hemoglobin   Date Value Ref Range Status   04/25/2017 11.1 (L) 11.7 - 15.7 g/dL Final   04/04/2017 10.4 (L) 11.7 - 15.7 g/dL Final   02/21/2017 11.6 (L) 11.7 - 15.7 g/dL Final     Urea Nitrogen   Date Value Ref Range Status   04/25/2017 16 7 - 30 mg/dL Final   04/04/2017 17 7 - 30 mg/dL Final   02/21/2017 13 7 - 30 mg/dL Final     Sed Rate   Date Value Ref Range Status   10/23/2012 11 0 - 20 mm/h Final   07/27/2006 9 0 - 20 mm/h Final     CRP Inflammation   Date Value Ref Range Status   12/29/2016 90.0 (H) 0.0 - 8.0 mg/L Final   06/06/2015 71.0 (H) 0.0 - 8.0 mg/L Final   10/23/2012 7.2 0.0 - 8.0 mg/L Final     AST   Date Value Ref Range Status   04/25/2017 58 (H) 0 - 45 U/L Final   04/04/2017 55 (H) 0 - 45 U/L Final   02/21/2017 116 (H) 0 - 45 U/L Final     Albumin   Date Value Ref Range Status   04/25/2017 3.7 3.4 - 5.0 g/dL Final   04/04/2017 3.2 (L) 3.4 - 5.0  g/dL Final   02/21/2017 3.7 3.4 - 5.0 g/dL Final     Alkaline Phosphatase   Date Value Ref Range Status   04/25/2017 363 (H) 40 - 150 U/L Final   04/04/2017 438 (H) 40 - 150 U/L Final   02/21/2017 612 (H) 40 - 150 U/L Final     ALT   Date Value Ref Range Status   04/25/2017 85 (H) 0 - 50 U/L Final   04/04/2017 88 (H) 0 - 50 U/L Final   02/21/2017 189 (H) 0 - 50 U/L Final     Rheumatoid Factor   Date Value Ref Range Status   04/10/2017 <20 <20 IU/mL Final     Recent Labs   Lab Test  04/25/17   1355  04/04/17   0757  02/21/17   1315   12/20/16   1121   10/23/15   1554   10/09/13   0721   WBC  4.4  5.0  4.7   < >   --    < >  4.8   < >   --    HGB  11.1*  10.4*  11.6*   < >   --    < >  13.3   < >   --    HCT  36.1  33.4*  37.1   < >   --    < >  39.1   < >   --    MCV  82  83  87   < >   --    < >  89   < >   --    PLT  543*  482*  733*   < >   --    < >  212   < >   --    BUN  16  17  13   < >   --    < >  10   < >   --    TSH   --    --    --    --   0.13*   --   0.57   --   1.86   AST  58*  55*  116*   < >   --    < >  56*   < >  53*   ALT  85*  88*  189*   < >   --    < >  79*   < >  64*   ALKPHOS  363*  438*  612*   < >   --    < >  206*   < >  130    < > = values in this interval not displayed.     AMARI undetected  SSA, SSB, cryoglobulin all negative  Normal C3, C4  RF undetected  IGG subclasses with mild IGG4 elevation to 146H  SPEP with normal pattern and no monoclonal protein seen.    Other studies:   12/28/2016:  FINAL DIAGNOSIS:   A. Spleen, splenectomy:   - Splenic tissue with no significant histologic abnormality     B. Duodenum and pancreas, resection:   - Chronic pancreatitis   - Duodenum with no significant histologic abnormality     C. Pancreas, uncinate process, biopsy:   - Chronic pancreatitis     D. Liver, dome lesion, needle core biopsy:   - Liver with spindle cell proliferation/lesion, acute and chronic   inflammation and fibrosis   - Focally increased IgG4 positive plasma cells (upto 60/HPF)   -  See comment     E. Pancreas, head, biopsy:   - Chronic pancreatitis     F. Pancreas, tail, biopsy:   - Chronic pancreatitis     G. Pancreas, body, biopsy:   - Chronic pancreatitis with fat necrosis     COMMENT:   Sections of the liver lesion show stromal proliferation composed of   bland spindle cell bundle and whorls with associated fibrosis   infiltrated by neutrophils, lymphocytes, plasma cells and macrophages.   Occasional lymphoid aggregates are noted. The surrounding liver   parenchymal tissue show diffuse moderate portal inflammation composed of   lymphocytes and plasma cells.  There is also a mild to moderate   infiltrate of neutrophils.  Reticulin stain shows preserved reticulin   framework.  Trichrome stains highlight the spindle cell proliferation   and show mild portal fibrosis.  Immunostain are performed with   appropriate controls. The spindle cell proliferation is negative for ALK   and show patchy infiltrate of IgG4 positive plasma cells (upto 60/HPF).   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 Possibility of IgG4-related disease cannot be ruled out.   Clinical and radiologic correlation is recommended.     MRI Abdomen:4/22/2017  IMPRESSION:  1. 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  2. No significant change in small wedge-shaped peripheral areas of  persistent enhancement surrounding mildly dilated biliary radicles,  findings most compatible with cholangitis or sequela of previous  infection.  3. Nonspecific subcutaneous fluid collection in the left upper  quadrant amidst the abdominal wall postsurgical changes.    Reviewed Rheumatology lab flowsheet

## 2017-05-12 NOTE — PATIENT INSTRUCTIONS
-- can consider restasis eye drops to help with the eyes  -- evoxac is a medicine to help with the mouth glands, we can   -- biotin can be used to help keep he mouth moist.  -- ACT lozenges are good for mouth dry.  -- to further assess between Sjogren's or IgG4 disease involving eyes and mouth and possibly liver we would need the lip biopsy. We will put a referral to the ENT doctor for this biopsy.   -- we will talk with GI regarding if all this information is enough to treat vs watching and if we want to treat (Rituximab (with prednisone) vs CellCept).  -- we will want to see you after the biopsy results, 3 months or sooner if needed.

## 2017-05-12 NOTE — LETTER
5/12/2017       RE: Dinora Mcghee  [address] [facility] [address]     Dear Colleague,    Thank you for referring your patient, Dinora Mcghee, to the [facility] RHEUMATOLOGY at [facility]. Please see a copy of my visit note below.    Rheumatology Clinic Visit-Fellow Note     Dinora Mcghee MRN# [id number]   YOB: 1965 Age: 52 year old     Date of Visit: 05/12/2017  Primary care provider: Justyna Lawson  Referring Provider: Dr. Ara Lugo with GI  Reason for Referral: Further evaluation and management for possible Sjogren's vs IgG4 related disease in patient with sicca symptoms and evidence of isolated? IgG4 in liver/pancreas.           Assessment and Plan:   Diagnosis:  # Sicca Symptoms  # Elevated transaminases with Hepatic Dome Lesion (improving)  # Suspicious for IgG4 related disease, IgG4 elevation in serum and on liver biopsy (> 60 HPF)  # Chronic Pancreatitis s/p pancreatectomy with islet autotransplant 12/2016.   # h/o endometriosis  # h/o pituitary mass with 2/2 DI now s/p transsphenoidal resection in 1990    Discussion:  53 y/o female with longstanding history of chronic pancreatitis s/p pancreatectomy with islet autotransplant 12/2016 who is overall improving but found to have elevated serum and > 60 HPF cells on liver biopsy staining. She has sicca symptoms for the past 5 years and this brings up the possibility of Sjogren's vs IgG4 related disease. No other evidence of IgG4 related activity with no RPF, aortitis, orbital disease, thyroid, pulmonary, or kidney disease. Interestingly, IgG4 disease can impact the hypophysis and she does have a history of pituitary issues back in 1990. Recent meta analysis assessing IgG4 serum sensitivity is 87.2% with 82% specificity (1). Her biopsy containing > 50 IgG4 staining plasma cells is highly suggestive of IgG4 related disease as well and meets recent proposed pathologic recommendations  of IgG4 related disease (2). She meets many of the features for IgG4 related disease and given her largely unremarkable Sjogren's serologic evaluation we are most suspicious for IgG4 related disease as her primary etiology of her sicca symptoms. Unfortunately, there is a significant percentage of patients who are seronegative for Sjogren's and thus further evaluation with lip biopsy is warranted.     1. Daniel Barber et al.  Diagnostic Value of Serum IgG4 for IgG4-Related Disease: A MADELYN-Compliant Systematic Review and [place]-Analysis.  Ed. Lj Quinn. Medicine 95.21 (2016): e3785. [employer]. Web. 17 May 2017.  2. Annabel V, Ever Y, Donovan JK, et al. Consensus statement on the pathology of IgG4-RD. Mod Pathol 2012;64:3061-7.    Plan:  -- recommend ENT lip Biopsy with Dian Armijo (stable symptoms no non urgent) to further stain/assess for IgG4 related disease vs Sjogren's (less likely).  -- therapeutic options for IgG4 related disease would normally be Rituximab preferred (but has steroids with infusion) vs MMF (no steroids but not ideal agent).  -- will need to coordinate with GI regarding if isolated GI manifestations or addition of sicca enough to treat vs watching given the SE of the therapies.   -- recommend Hep B core Ab and quantiferon gold in anticipation of possible Rituximab use  -- Patient deferred initiating Evoxac or restasis at this point in time.  -- discussed various lozenges that help sicca symptoms    Follow up:  RTC in 3 months, after lip biopsy and discussion with GI.    Prevention:  Pneumovax PPSV23: 12/2016  PCV13 (Prevnar): not in [facility]  Tdap: 1/7/2008?  Shingles vaccine: not recieved  Reminded if on biologic no live vaccine (flu mist, shingles): n/a  HBVsAg: negative  HBVcore: ordered  HCV: negative   Q gold (or T spot): ordered  Bactrim prophylaxis: n/a  Fungal prophylaxis: n/a  Vitamin D and calcium/bone health: Ca and Vitamin D    Immunizations:  Immunization History   Administered Date(s)  Administered     HIB 12/01/2016     Influenza (IIV3) 09/29/2011, 11/15/2014, 10/21/2016     Influenza (intradermal) 11/01/2003, 09/29/2011     Influenza Vaccine IM 3yrs+ 4 Valent IIV4 10/24/2015, 10/21/2016     Influenza Vaccine, 3 YRS +, IM (QUADRIVALENT W/PRESERVATIVES) 11/04/2013     Meningococcal (Bexsero ) 12/09/2016     Meningococcal (Menactra ) 12/01/2016     Pneumococcal 23 valent 12/01/2016     TD (ADULT, 7+) 07/09/1998     TDAP Vaccine (Adacel) 01/07/2008     Discussed with attending Dr. Becerra, who agrees with the above assessment and plan.    Jesse Richardson DO  Rheumatology Fellow  Pager: [phone]    Orders Placed This Encounter   Procedures     Hepatitis B core antibody     M Tuberculosis by Quantiferon Gold     OTOLARYNGOLOGY REFERRAL     Attending Note: I saw and evaluated the patient with Dr. Richardson. I agree with the assessment and plan.    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  Document previously scanned on  1/12/17.  Validation form completed and scanned.  Code Status reflects choices in most recent ACP document..  Confirmed/documented designated decision maker(s).  Added by May Baires   Advance Care Planning Liaison               Acute pancreatitis 06/03/2015     Abdominal pain 06/02/2015     S/P ERCP 06/02/2015     History of ERCP 04/20/2015     Other type of intractable migraine      Diagnosis updated by automated process. Provider to review and confirm.       GERD (gastroesophageal reflux disease) 12/01/2010     Lumbago 04/18/2005     History of L5-S1 degenerative disk disease.         Sprain and strain of other specified sites of hip and thigh 12/09/2002     Chondromalacia of patella 12/09/2002     Need for prophylactic immunotherapy      trees, grass, srw, dust mites, cat       Allergic rhinitis due to other allergen 12/21/2001     Hypothyroidism      Problem list name updated by automated process. Provider to review       Sensorineural hearing loss 01/10/2005     Problem list name updated by automated process. Provider to review       Vertiginous syndrome and labyrinthine disorder 01/10/2005     Problem list name updated by automated process. Provider to review              History of Present Illness:   Dinora Mcghee is a 52 year old female with a past medical history of hypothyroidism, h/o pituitary mass and secondary DI s/p transphenoidal resection 1990, HLD, left knee OA, lumbar DDD, chronic pancreatitis for 30 years, now s/p islet cell transplantation who presents today for further evaluation of elevated IGG4 found both in serum and liver biopsy as well as sicca symptoms.     She notes that she has had longstanding issues with pancreatitis without any mention of aortitis, RPF, urinary obstruction. She denies any history of cancers, lymphoma or leukemia. No family history of autoimmune conditions other than possible RA in her dad. She had distant DI secondary from pituitary mass (s/p removal  "and not needing hormones) and joint wise has longstanding OA of left knee. She mentions that for the past 5 years she has had dry eye requiring eye drops. Sometimes related to her allergies she has to now regularly use eye drops. \"Feels like sand\" is rubbing in eyes. She also noticed ~ 5 years ago dry mouth where a cracker would not dissolve. She has to constantly carry water and drink it to keep her mouth dry. She uses sugar free gum and lozenges. Most of her clinical symptom burden has been related to her pancreas with frequent ERCP and concern for Sphincter of Oddi dysfunction. She had issues with significant pancreatic insuffiencey with malnutrition and weight loss. She underwent islet cell autotransplantation 12/28/2017 and was found to have elevated IgG4 cells in her liver biopsy. Subsequent IgG4 levels were elevated            Review of Systems:   Constitutional: denies fevers/chills, fatigue, patient is having weight gain s/p transplant.  Skin: denies rash, raynauds or alopecia  Eyes: denies hx of uvetitis, double vision, positive dry eyes  Ears/Nose/Throat:  denies recurrent URI or sinusitis, positive dry mouth, denies any oral or nasal ulcers  Respiratory: No shortness of breath, dyspnea on exertion, cough, or hemoptysis  Cardiovascular:  denies chest pain, palpitations, hx pleurisy  Gastrointestinal: denies diarrhea, blood in stool, hx of IBD, positive past pancreatitis.   Genitourinary: denies hematuria. Denies any RPF.   Musculoskeletal: denies red, tender, swollen joints, left shoulder frozen shoulder since surgery.   Neurologic:  denies headaches, seizures, some numbness or tingling s/p abdominal surgery.  Psychiatric:  denies history of depression or mental illness  Hematologic/Lymphatic/Immunologic:  denies history of blood clots, easy bruising, bleeding.  Endocrine:  Positive hypothyroidism.          Past Medical History:     Past Medical History:   Diagnosis Date     Allergic rhinitis, cause " unspecified      Allergy to other foods     strawberries, apples, celeries, alice, watermelon     Arthritis     left knee     Choledocholithiasis     long after cholecystectomy     Chronic abdominal pain      Chronic constipation      Chronic nausea      Chronic pancreatitis (H)      Degeneration of lumbar or lumbosacral intervertebral disc      Esophageal reflux     w/ hiatal hernia     Gastroparesis      Hiatal hernia      History of pituitary adenoma     s/p resection     Hypothyroidism      Migraines      Mild hyperlipidemia      On tube feeding diet     presence of GJ tube     Pancreatic disease     PD stricture, 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    Past Surgical History  Past Surgical History:   Procedure Laterality Date     ABDOMEN SURGERY      c sections: 93, 96, 98. endometriosis growth     APPENDECTOMY       C  DELIVERY ONLY       C  DELIVERY ONLY      repeat c section with incidental cystotomy with repair     C EXCIS PITUITARY,TRANSNASAL/SEPTAL      pituitary tumor removed for diabetes insipidus     C TOTAL ABDOM HYSTERECTOMY      w/ bilateral salpingoophorectomy      C WRIST ARTHROSCOP,RELEASE XVERS LIG Bilateral 08      SECTION       COLONOSCOPY       ENDOSCOPIC RETROGRADE CHOLANGIOPANCREATOGRAM N/A 2015    Procedure: ENDOSCOPIC RETROGRADE CHOLANGIOPANCREATOGRAM;  Surgeon: Mandeep Park MD;  Location: [facility]     ENDOSCOPIC RETROGRADE CHOLANGIOPANCREATOGRAM N/A 2016    Procedure: COMBINED ENDOSCOPIC RETROGRADE CHOLANGIOPANCREATOGRAPHY, PLACE TUBE/STENT;  Surgeon: Mandeep Park MD;  Location: [facility]     ENDOSCOPIC RETROGRADE CHOLANGIOPANCREATOGRAM N/A 3/17/2016    Procedure: COMBINED ENDOSCOPIC RETROGRADE CHOLANGIOPANCREATOGRAPHY, REMOVE FOREIGN BODY OR  STENT/TUBE;  Surgeon: Mandeep Park MD;  Location: [facility]     ENDOSCOPIC RETROGRADE CHOLANGIOPANCREATOGRAM N/A 8/2/2016    Procedure: COMBINED ENDOSCOPIC RETROGRADE CHOLANGIOPANCREATOGRAPHY, PLACE TUBE/STENT;  Surgeon: Mandeep Park MD;  Location: [facility]     ENDOSCOPIC RETROGRADE CHOLANGIOPANCREATOGRAM N/A 8/26/2016    Procedure: COMBINED ENDOSCOPIC RETROGRADE CHOLANGIOPANCREATOGRAPHY, REMOVE FOREIGN BODY OR STENT/TUBE;  Surgeon: Mandeep Park MD;  Location: [facility]     ENDOSCOPIC ULTRASOUND UPPER GASTROINTESTINAL TRACT (GI) N/A 10/3/2016    Procedure: ENDOSCOPIC ULTRASOUND, ESOPHAGOSCOPY / UPPER GASTROINTESTINAL TRACT (GI);  Surgeon: Guru Jose Rodas MD;  Location: [facility]     ESOPHAGOSCOPY, GASTROSCOPY, DUODENOSCOPY (EGD), COMBINED N/A 6/24/2015    Procedure: COMBINED ESOPHAGOSCOPY, GASTROSCOPY, DUODENOSCOPY (EGD), REMOVE FOREIGN BODY;  Surgeon: Mandeep Park MD;  Location: [facility]     ESOPHAGOSCOPY, GASTROSCOPY, DUODENOSCOPY (EGD), COMBINED N/A 10/25/2015    Procedure: COMBINED ESOPHAGOSCOPY, GASTROSCOPY, DUODENOSCOPY (EGD);  Surgeon: Sammy Amaro MD;  Location: [facility]     ESOPHAGOSCOPY, GASTROSCOPY, DUODENOSCOPY (EGD), COMBINED N/A 10/25/2015    Procedure: COMBINED ESOPHAGOSCOPY, GASTROSCOPY, DUODENOSCOPY (EGD), BIOPSY SINGLE OR MULTIPLE;  Surgeon: Sammy Amaro MD;  Location: [facility]     ESOPHAGOSCOPY, GASTROSCOPY, DUODENOSCOPY (EGD), DILATATION, COMBINED       EXCISE LESION TRUNK N/A 4/17/2017    Procedure: EXCISE LESION TRUNK;  Removal of Abdominal Foreign Body;  Surgeon: Nestor Phoenix MD;  [facility]     HC ESOPH/GAS REFLUX TEST W NASAL IMPED >1 HR N/A 11/19/2015    Procedure: ESOPHAGEAL IMPEDENCE FUNCTION TEST WITH 24 HOUR PH GREATER THAN 1 HOUR;  Surgeon: Thiago Apple MD;  Location: [facility]     HC UGI ENDOSCOPY DIAG W BIOPSY  9/17/08     HC UGI ENDOSCOPY DIAG W BIOPSY  9/27/12     HC UGI ENDOSCOPY W ESOPHAGEAL DILATION BALLOON  <30MM  9/17/08     HC UGI ENDOSCOPY W EUS N/A 5/5/2015    Procedure: COMBINED ENDOSCOPIC ULTRASOUND, ESOPHAGOSCOPY, GASTROSCOPY, DUODENOSCOPY (EGD);  Surgeon: Wm Dueñas MD;  Location: [facility]     INJECT TRANSVERSUS ABDOMINIS PLANE (TAP) BLOCK BILATERAL Left 9/22/2016    Procedure: INJECT TRANSVERSUS ABDOMINIS PLANE (TAP) BLOCK BILATERAL;  Surgeon: Dickson Corrigan MD;  Location: [facility]     laparoscopic pineda  1995     LAPAROSCOPIC PANCREATECTOMY, TRANSPLANT AUTO ISLET CELL N/A 12/28/2016    Procedure: LAPAROSCOPIC PANCREATECTOMY, TRANSPLANT AUTO ISLET CELL;  Surgeon: Nestor Phoenix MD;  Location: [facility]     transphenoidal pituitary resection  1990            Social History:     Social History     Occupational History     director [employer]     Social History Main Topics     Smoking status: Former Smoker     Packs/day: 0.50     Years: 6.00     Types: Cigarettes     Start date: 9/1/1985     Quit date: 1/1/1992     Smokeless tobacco: Never Used      Comment: no 2nd hand     Alcohol use No      Comment: last drink 6/2016  - moderate social     Drug use: No     Sexual activity: Yes     Partners: Male     Birth control/ protection: None      Comment: Hystectomy 1999   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, has been on disability since 8/1/2016. 6 years of smoking and quit in 1992. Denies any ETOH.  and lives in the [facility].          Family History:     Family History   Problem Relation Age of Onset     Eye Disorder Father      cataract, detached retina     Myocardial Infarction Father 60     Lipids Father      CEREBROVASCULAR DISEASE Father      Depression Father      Substance Abuse Father      Anesthesia Reaction Father      stroke right after surgery     Lipids Mother      Hypertension Mother      Thyroid Disease Mother      Eye Disorder Son      ptosis     Eye Disorder Paternal Grandmother      cataract     Eye Disorder Paternal Grandfather      cataract     DIABETES Paternal Grandfather      Eye Disorder Maternal  Grandmother      cataract     Thyroid Disease Maternal Grandmother      Coronary Artery Disease Sister      Depression Sister      Depression Son      Anxiety Disorder Son      Thyroid Disease Sister      DIABETES Maternal Grandfather      Depression Nephew      Anxiety Disorder Nephew      Thyroid Disease Nephew      Type 2 Diabetes Cousin      paternal cousin     Father with likely RA following with multiple rheumatologists. 3 children healthy. 1 sister with thyroid issues. Denies any Sjogren's SLE, Scleroderma.            Allergies:     Allergies   Allergen Reactions     Apple Anaphylaxis     Depakote [Divalproex Sodium] Other (See Comments)     Chest pain     Zithromax [Azithromycin Dihydrate] Anaphylaxis     Noted in 4/7/08 ER     Darvocet [Propoxyphene N-Apap] Itching     Morphine Nausea and Vomiting and Rash     Nalbuphine Hcl Rash     RASH :nubaine     Sinclair Hives     Zosyn [Piperacillin-Tazobactam In D5w] Rash     Possible allergy, did have a diffuse rash that seemed drug related but could have also been related to soap in the hospital.      Bactrim [Sulfamethoxazole W-Trimethoprim] Other (See Comments) and Nausea and Vomiting     Severely low liver function.     Cats      Compazine [Prochlorperazine] Other (See Comments)     Twitching. Takes Benedryl and is fine     Corticosteroids Other (See Comments)     All oral,IV and injectable steroids are contraindicated (unless in life threatening situations) in Islet Auto transplant recipients. They can cause irreversible loss of islet cell function. Please contact patients transplant care coordinator Mecca ANGEL @ [phone]/Pager [phone], and Endocrinologist prior to administration.     Dust Mites      Grass      Prednisone Other (See Comments)     Insomnia       Ragweeds      Tape [Adhesive Tape] Blisters     Trees      Zofran [Ondansetron] Other (See Comments)     migraine            Medications:     Current Outpatient Prescriptions    Medication Sig Dispense Refill     Nutritional Supplements (BOOST HIGH PROTEIN) LIQD After above baseline labs are drawn, give: 6 mL/kg to maximum of 360 mL; the beverage is to be consumed within 5 minutes.  0     lubiprostone (AMITIZA) 24 MCG capsule Take 1 capsule (24 mcg) by mouth 2 times daily (with meals) 60 capsule 3     insulin glargine (LANTUS) 100 UNIT/ML injection Inject 5 units subcutaneously every morning. 3 mL 3     cetirizine (ZYRTEC) 10 MG tablet Take 10 mg by mouth daily       LORazepam (ATIVAN) 0.5 MG tablet Take 1 tablet (0.5 mg) by mouth every 6 hours as needed for anxiety 20 tablet 0     Lubiprostone 8 MCG CAPS capsule Take 1 capsule (8 mcg) by mouth 2 times daily.  Take 1 capsule by mouth po BID x 5 days.  If no effect, increase to 2 capsules po BID x 5 days.  If no effect, increase to 3 capsules po BID and continue until further instruction. 90 capsule 3     insulin aspart (NOVOLOG PENFILL) 100 UNIT/ML injection Administer subcutaneously 0.5 unit per 45 grams of carbohydrates. 3 mL 3     Nutritional Supplements (BOOST HIGH PROTEIN) LIQD After above baseline labs are drawn, give: 6 mL/kg to maximum of 360 mL; the beverage is to be consumed within 5 minutes.  0     cyclobenzaprine (FLEXERIL) 5 MG tablet Take 5-10 mg by mouth three times per day as needed for muscle spasms. 42 tablet 3     amylase-lipase-protease (CREON 24) 64939 UNITS CPEP per EC capsule Take 3-4 capsules by mouth with meals and 1-2 with snacks. Maximum 15 capsules per day. 180 capsule 3     insulin aspart (NOVOLOG PEN) 100 UNIT/ML injection aspart medium correction 1 unit per 50 > 130 every 4 hours    For Pre-Meal  - 179 give 1 unit.   For Pre-Meal  - 230 give 2 units.   For Pre-Meal  - 281 give 3 units.   For Pre-Meal  - 332 give 4 units.  3     amitriptyline (ELAVIL) 10 MG tablet Take 2 tablets (20 mg) by mouth At Bedtime 60 tablet 3     senna-docusate (SENOKOT-S;PERICOLACE) 8.6-50 MG per tablet Take  1 tablet by mouth 2 times daily as needed for constipation 100 tablet      prochlorperazine (COMPAZINE) 5 MG tablet Take two 5 mg tablets by mouth every six hours as needed for nausea. 90 tablet 3     polyethylene glycol (MIRALAX/GLYCOLAX) Packet Take 17 g by mouth daily as needed for constipation 7 packet 11     diphenhydrAMINE (BENADRYL ALLERGY) 25 MG capsule Take 1 capsule (25 mg) by mouth every 6 hours as needed for itching or allergies 56 capsule 0     levothyroxine (SYNTHROID/LEVOTHROID) 125 MCG tablet Take 1 tablet (125 mcg), by mouth daily before breakfast. 1 tablet 0     docusate sodium (COLACE) 100 MG capsule Take 1 capsule (100 mg) by mouth 2 times daily as needed for constipation, 60 capsule 0     multivitamin CF formula (CHOICEFUL) capsule Take 1 tablet by mouth daily  11     Blood Glucose Monitoring Suppl (CONTOUR NEXT EZ MONITOR) W/DEVICE KIT 1 Device 6 times daily       pregabalin (LYRICA) 75 MG capsule Take 1 capsule (75 mg) by mouth 2 times daily 150 capsule 3     LANsoprazole (PREVACID) 30 MG CR capsule Take 1 capsule (30 mg) by mouth daily Take 30-60 minutes before a meal. 30 capsule 11     aspirin 81 MG EC tablet Take 1 tablet (81 mg) by mouth daily 90 tablet 3     Injection Device for insulin (NOVOPEN ECHO) MARCO 1 each daily as needed 1 each 0     insulin pen needle (Mobincube KEN U/F) 32G X 4 MM Use 6-8 times per day 100 each 3     glucose 40 % GEL gel Take 15-30 g by mouth every 15 minutes as needed for low blood sugar 3 Tube 2     Sharps Container (BD SHARPS ) MISC 1 Container as needed 1 each 1     blood glucose monitoring (Xierkang CONTOUR NEXT) test strip Use to test blood sugar 6-8 times daily or as directed. 200 strip prn     blood glucose monitoring (PAT MICROLET) lancets Use to test blood sugar 6-8 times daily or as directed. 1 Box prn     celecoxib (CELEBREX) 100 MG capsule Take 1 capsule (100 mg) by mouth 2 times daily (Patient not taking: Reported on 5/12/2017) 60 capsule 1      "oxyCODONE (ROXICODONE) 5 MG IR tablet Take 1-2 tablets (5-10 mg) by mouth every 4 hours as needed for moderate to severe pain (Patient not taking: Reported on 4/10/2017) 150 tablet 0     glucagon (GLUCAGON EMERGENCY) 1 MG kit Inject 1 mg into the muscle once for 1 dose 1 mg 1            Physical Exam:   Height 1.727 m (5' 8\"), weight 69.4 kg (153 lb), not currently breastfeeding.  Wt Readings from Last 4 Encounters:   05/12/17 69.4 kg (153 lb)   04/25/17 68.4 kg (150 lb 12.8 oz)   04/17/17 68.3 kg (150 lb 9.6 oz)   04/10/17 68.3 kg (150 lb 9.6 oz)     Ideal body weight: 63.9 kg (140 lb 14 oz)  Adjusted ideal body weight: 66.1 kg (145 lb 11.6 oz)    Constitutional: well-developed, appearing stated age; cooperative  Eyes: nl EOM, conjunctiva, sclera  ENT: nl external ears, nose, lips, teeth, gums, throat  No mucous membrane lesions, dry saliva pool, no parotid enlargement  Neck: no mass or thyroid enlargement  Resp: lungs clear to auscultation, nl to palpation  CV: RRR, no murmurs, rubs or gallops, no edema  GI: no ABD mass or tenderness  Lymph: no cervical, supraclavicular, epitrochlear nodes    MS: All TMJ, neck, shoulder, elbow, wrist, MCP/PIP/DIP, spine, hip, knee, ankle, and foot MTP/IP joints were examined.     No dactylitis,  tenosynovitis, enthesopathy  Soft tissue exam - No specific tenderness to palpation of soft tissue points on examination today   Neck - normal range of motion  Shoulder -  Right shoulder with normal ROM in full abduction/adduction and internal/external rotation without pain. Left shoulder with decreased abduction and IR and ER on both passive and active ROM. No glenohumeral effusion/warmth, no tenderness bilaterally. SC joints without effusion or tenderness on palpation.  Elbow - full ROM and no joint effusion on exam; nontender bilaterally   Wrist - full ROM and no joint effusion on exam; nontender bilaterally  Hand - T0S0 of bilateral DIPs, PIPs, MCP joints. Normal hand- without " pain.   Back - no specific spinal or paraspinal tenderness present  Pelvic - no tenderness along the SI joints, bilaterally.  Hip - full ROM; nontender bilaterally  Knee - no joint effusion or joint line tenderness on exam; normal range of motion.  Ankle -  Left and right ankle joint lines without swelling or tenderness. Non-tender to palpation  Foot - no focal pain or synovitis on palpation of the MTPs bilaterally    Skin: no nail pitting, alopecia, rash, nodules or lesions  Neuro: nl cranial nerves, strength in both proximal and distal UE and LE.   Psych: nl judgement, orientation, memory, affect.         Data:     Results for orders placed or performed in visit on 04/25/17   CBC with platelets differential   Result Value Ref Range    WBC 4.4 4.0 - 11.0 10e9/L    RBC Count 4.38 3.8 - 5.2 10e12/L    Hemoglobin 11.1 (L) 11.7 - 15.7 g/dL    Hematocrit 36.1 35.0 - 47.0 %    MCV 82 78 - 100 fl    MCH 25.3 (L) 26.5 - 33.0 pg    MCHC 30.7 (L) 31.5 - 36.5 g/dL    RDW 17.5 (H) 10.0 - 15.0 %    Platelet Count 543 (H) 150 - 450 10e9/L    Diff Method Automated Method     % Neutrophils 26.7 %    % Lymphocytes 44.5 %    % Monocytes 15.1 %    % Eosinophils 11.2 %    % Basophils 2.5 %    % Immature Granulocytes 0.0 %    Nucleated RBCs 0 0 /100    Absolute Neutrophil 1.2 (L) 1.6 - 8.3 10e9/L    Absolute Lymphocytes 2.0 0.8 - 5.3 10e9/L    Absolute Monocytes 0.7 0.0 - 1.3 10e9/L    Absolute Eosinophils 0.5 0.0 - 0.7 10e9/L    Absolute Basophils 0.1 0.0 - 0.2 10e9/L    Abs Immature Granulocytes 0.0 0 - 0.4 10e9/L    Absolute Nucleated RBC 0.0    Comprehensive metabolic panel   Result Value Ref Range    Sodium 142 133 - 144 mmol/L    Potassium 4.4 3.4 - 5.3 mmol/L    Chloride 107 94 - 109 mmol/L    Carbon Dioxide 31 20 - 32 mmol/L    Anion Gap 4 3 - 14 mmol/L    Glucose 111 (H) 70 - 99 mg/dL    Urea Nitrogen 16 7 - 30 mg/dL    Creatinine 0.62 0.52 - 1.04 mg/dL    GFR Estimate >90  Non  GFR Calc   >60 mL/min/1.7m2     GFR Estimate If Black >90   GFR Calc   >60 mL/min/1.7m2    Calcium 9.3 8.5 - 10.1 mg/dL    Bilirubin Total 0.9 0.2 - 1.3 mg/dL    Albumin 3.7 3.4 - 5.0 g/dL    Protein Total 7.7 6.8 - 8.8 g/dL    Alkaline Phosphatase 363 (H) 40 - 150 U/L    ALT 85 (H) 0 - 50 U/L    AST 58 (H) 0 - 45 U/L   Prealbumin   Result Value Ref Range    Prealbumin 16 15 - 45 mg/dL       Recent Labs   Lab Test  04/25/17   1355  04/04/17   0757  02/21/17   1315   12/29/16   0620   06/06/15   1903   10/23/12   1451   WBC  4.4  5.0  4.7   < >  10.7   < >   --    < >   --    RBC  4.38  4.04  4.28   < >  3.13*   < >   --    < >   --    HGB  11.1*  10.4*  11.6*   < >  9.1*   < >   --    < >   --    HCT  36.1  33.4*  37.1   < >  27.7*   < >   --    < >   --    MCV  82  83  87   < >  89   < >   --    < >   --    RDW  17.5*  15.8*  13.0   < >  13.2   < >   --    < >   --    PLT  543*  482*  733*   < >  107*   < >   --    < >   --    ALBUMIN  3.7  3.2*  3.7   < >  2.9*   < >  2.8*   < >  4.4   CRP   --    --    --    --   90.0*   --   71.0*   --   7.2   BUN  16  17  13   < >  7   < >  2*   < >   --     < > = values in this interval not displayed.      Recent Labs   Lab Test  12/20/16   1121  10/23/15   1554  10/09/13   0721   06/25/09   1423   TSH  0.13*  0.57  1.86   < >  0.37*   T4  1.17   --    --    --   1.17    < > = values in this interval not displayed.     Hemoglobin   Date Value Ref Range Status   04/25/2017 11.1 (L) 11.7 - 15.7 g/dL Final   04/04/2017 10.4 (L) 11.7 - 15.7 g/dL Final   02/21/2017 11.6 (L) 11.7 - 15.7 g/dL Final     Urea Nitrogen   Date Value Ref Range Status   04/25/2017 16 7 - 30 mg/dL Final   04/04/2017 17 7 - 30 mg/dL Final   02/21/2017 13 7 - 30 mg/dL Final     Sed Rate   Date Value Ref Range Status   10/23/2012 11 0 - 20 mm/h Final   07/27/2006 9 0 - 20 mm/h Final     CRP Inflammation   Date Value Ref Range Status   12/29/2016 90.0 (H) 0.0 - 8.0 mg/L Final   06/06/2015 71.0 (H) 0.0 - 8.0 mg/L Final    10/23/2012 7.2 0.0 - 8.0 mg/L Final     AST   Date Value Ref Range Status   04/25/2017 58 (H) 0 - 45 U/L Final   04/04/2017 55 (H) 0 - 45 U/L Final   02/21/2017 116 (H) 0 - 45 U/L Final     Albumin   Date Value Ref Range Status   04/25/2017 3.7 3.4 - 5.0 g/dL Final   04/04/2017 3.2 (L) 3.4 - 5.0 g/dL Final   02/21/2017 3.7 3.4 - 5.0 g/dL Final     Alkaline Phosphatase   Date Value Ref Range Status   04/25/2017 363 (H) 40 - 150 U/L Final   04/04/2017 438 (H) 40 - 150 U/L Final   02/21/2017 612 (H) 40 - 150 U/L Final     ALT   Date Value Ref Range Status   04/25/2017 85 (H) 0 - 50 U/L Final   04/04/2017 88 (H) 0 - 50 U/L Final   02/21/2017 189 (H) 0 - 50 U/L Final     Rheumatoid Factor   Date Value Ref Range Status   04/10/2017 <20 <20 IU/mL Final     Recent Labs   Lab Test  04/25/17   1355  04/04/17   0757  02/21/17   1315   12/20/16   1121   10/23/15   1554   10/09/13   0721   WBC  4.4  5.0  4.7   < >   --    < >  4.8   < >   --    HGB  11.1*  10.4*  11.6*   < >   --    < >  13.3   < >   --    HCT  36.1  33.4*  37.1   < >   --    < >  39.1   < >   --    MCV  82  83  87   < >   --    < >  89   < >   --    PLT  543*  482*  733*   < >   --    < >  212   < >   --    BUN  16  17  13   < >   --    < >  10   < >   --    TSH   --    --    --    --   0.13*   --   0.57   --   1.86   AST  58*  55*  116*   < >   --    < >  56*   < >  53*   ALT  85*  88*  189*   < >   --    < >  79*   < >  64*   ALKPHOS  363*  438*  612*   < >   --    < >  206*   < >  130    < > = values in this interval not displayed.     AMARI undetected  SSA, SSB, cryoglobulin all negative  Normal C3, C4  RF undetected  IGG subclasses with mild IGG4 elevation to 146H  SPEP with normal pattern and no monoclonal protein seen.    Other studies:   12/28/2016:  FINAL DIAGNOSIS:   A. Spleen, splenectomy:   - Splenic tissue with no significant histologic abnormality     B. Duodenum and pancreas, resection:   - Chronic pancreatitis   - Duodenum with no significant  histologic abnormality     C. Pancreas, uncinate process, biopsy:   - Chronic pancreatitis     D. Liver, dome lesion, needle core biopsy:   - Liver with spindle cell proliferation/lesion, acute and chronic   inflammation and fibrosis   - Focally increased IgG4 positive plasma cells (upto 60/HPF)   - See comment     E. Pancreas, head, biopsy:   - Chronic pancreatitis     F. Pancreas, tail, biopsy:   - Chronic pancreatitis     G. Pancreas, body, biopsy:   - Chronic pancreatitis with fat necrosis     COMMENT:   Sections of the liver lesion show stromal proliferation composed of   bland spindle cell bundle and whorls with associated fibrosis   infiltrated by neutrophils, lymphocytes, plasma cells and macrophages.   Occasional lymphoid aggregates are noted. The surrounding liver   parenchymal tissue show diffuse moderate portal inflammation composed of   lymphocytes and plasma cells.  There is also a mild to moderate   infiltrate of neutrophils.  Reticulin stain shows preserved reticulin   framework.  Trichrome stains highlight the spindle cell proliferation   and show mild portal fibrosis.  Immunostain are performed with   appropriate controls. The spindle cell proliferation is negative for ALK   and show patchy infiltrate of IgG4 positive plasma cells (upto 60/HPF).   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 Possibility of IgG4-related disease cannot be ruled out.   Clinical and radiologic correlation is recommended.     MRI Abdomen:4/22/2017  IMPRESSION:  1. 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  2. No significant change in small wedge-shaped peripheral areas of  persistent enhancement surrounding mildly dilated biliary radicles,  findings most compatible with cholangitis or sequela of previous  infection.  3. Nonspecific subcutaneous fluid collection in the left upper  quadrant amidst the abdominal wall postsurgical  changes.    Reviewed Rheumatology lab flowsheet    Again, thank you for allowing me to participate in the care of your patient.      Sincerely,    Jesse Richardson, DO

## 2017-05-12 NOTE — MR AVS SNAPSHOT
After Visit Summary   5/12/2017    Dinora Mcghee    MRN: [id number]           Patient Information     Date Of Birth          1965        Visit Information        Provider Department      5/12/2017 7:30 AM Jesse Richardson DO M [facility] Rheumatology        Today's Diagnoses     Sicca syndrome (H)    -  1    IgG4-related sclerosing disease (H)        Elevation of level of transaminase or lactic acid dehydrogenase (LDH)          Care Instructions    -- can consider restasis eye drops to help with the eyes  -- evoxac is a medicine to help with the mouth glands, we can   -- biotin can be used to help keep he mouth moist.  -- ACT lozenges are good for mouth dry.  -- to further assess between Sjogren's or IgG4 disease involving eyes and mouth and possibly liver we would need the lip biopsy. We will put a referral to the ENT doctor for this biopsy.   -- we will talk with GI regarding if all this information is enough to treat vs watching and if we want to treat (Rituximab (with prednisone) vs CellCept).  -- we will want to see you after the biopsy results, 3 months or sooner if needed.        Follow-ups after your visit        Additional Services     OTOLARYNGOLOGY REFERRAL       Your provider has referred you to: [facility]: [facility] (Otolaryngology) - [place] [phone]  [email or web address]    Specifically with Dr. Dian roy for IgG4 related disease of mouth vs Sjogren's.    Please be aware that coverage of these services is subject to the terms and limitations of your health insurance plan.  Call member services at your health plan with any benefit or coverage questions.      Please bring the following with you to your appointment:    (1) Any X-Rays, CTs or MRIs which have been performed.  Contact the facility where they were done to arrange for  prior to your scheduled appointment.   (2) List of current  medications  (3) This referral request   (4) Any documents/labs given to you for this referral                  Follow-up notes from your care team     Return in about 3 months (around 8/12/2017).      Your next 10 appointments already scheduled     May 31, 2017 10:00 AM CDT   (Arrive by 9:45 AM)   Return Visit with Maria Elena Abdalla,    [facility] ([facility])    [address] [place] [id number]   [phone]            Jun 19, 2017  7:00 AM CDT   (Arrive by 6:45 AM)   Return Visit with Sebastian López DO   [facility] ([facility])    [address] [place] [id number]   [phone]            Jun 20, 2017  8:30 AM CDT   LAB with  LAB   [facility] ([facility])    [address] [place] [id number] [phone]           Patient must bring picture ID.  Patient should be prepared to give a urine specimen  Please do not eat 10-12 hours before your appointment if you are coming in fasting for labs on lipids, cholesterol, or glucose (sugar).  Pregnant women should follow their Care Team instructions. Water with medications is okay. Do not drink coffee or other fluids.   If you have concerns about taking  your medications, please ask at office or if scheduling via [employer], send a message by clicking on Secure Messaging, Message Your Care Team.            Jun 20, 2017  9:00 AM CDT   Nurse Visit with [facility] Nurse   [facility] Solid Organ Transplant ([facility])    [address] [place] [phone]   [phone]            Jun 20, 2017  2:00 PM CDT   (Arrive by 1:45 PM)   Return Auto Islet with Nestor Phoenix MD   [facility] Solid Organ Transplant ([facility])    [address] [place] [address]  [place] [id number] [phone]            Aug 14, 2017  7:45 AM CDT   Lab with  LAB   [facility] ([facility])    [address]  1st Floor  [facility] [id number] [phone]            Aug 14, 2017  8:50 AM CDT   (Arrive by 8:35 AM)   Return General Liver with Ara Lugo MD   [facility] ([facility])    [address]  3rd Floor  [facility] [id number] [phone]              Future tests that were ordered for you today     Open Future Orders        Priority Expected Expires Ordered    Hepatitis B core antibody Routine  5/12/2018 5/12/2017    M Tuberculosis by Quantiferon Gold Routine  5/12/2018 5/12/2017            Who to contact     If you have questions or need follow up information about today's clinic visit or your schedule please contact [facility] RHEUMATOLOGY directly at [phone].  Normal or non-critical lab and imaging results will be communicated to you by [employer], letter or phone within 4 business days after the clinic has received the results. If you do not hear from us within 7 days, please contact the clinic through [employer] or phone. If you have a critical or abnormal lab result, we will notify you by phone as soon as possible.  Submit refill requests through [employer] or call your pharmacy and they will forward the refill request to us. Please allow 3 business days for your refill to be completed.          Additional Information About Your Visit        [employer] Information     [employer] gives you secure access to your electronic health record. If you see a primary care provider, you can also send messages to your care team and make appointments. If you have questions, please call your primary care clinic.  If you do not have a primary care provider, please call [phone] and they will assist you.        Care EveryWhere ID     This is your Care EveryWhere ID. This could be used by other organizations  "to access your [facility] medical records  [id number]        Your Vitals Were     Pulse Temperature Height Pulse Oximetry BMI (Body Mass Index)       92 97.8  F (36.6  C) (Oral) 1.727 m (5' 8\") 98% 23.26 kg/m2        Blood Pressure from Last 3 Encounters:   05/12/17 103/62   04/25/17 114/78   04/17/17 107/67    Weight from Last 3 Encounters:   05/12/17 69.4 kg (153 lb)   04/25/17 68.4 kg (150 lb 12.8 oz)   04/17/17 68.3 kg (150 lb 9.6 oz)              We Performed the Following     OTOLARYNGOLOGY REFERRAL        Primary Care Provider Office Phone # Fax #    Justyna Lawson [phone]       [facility] [address]  [place] [address]        Thank you!     Thank you for choosing [facility]  for your care. Our goal is always to provide you with excellent care. Hearing back from our patients is one way we can continue to improve our services. Please take a few minutes to complete the written survey that you may receive in the mail after your visit with us. Thank you!             Your Updated Medication List - Protect others around you: Learn how to safely use, store and throw away your medicines at www.disposemymeds.org.          This list is accurate as of: 5/12/17  9:08 AM.  Always use your most recent med list.                   Brand Name Dispense Instructions for use    amitriptyline 10 MG tablet    ELAVIL    60 tablet    Take 2 tablets (20 mg) by mouth At Bedtime       amylase-lipase-protease 42470 UNITS Cpep per EC capsule    CREON 24    180 capsule    Take 3-4 capsules by mouth with meals and 1-2 with snacks. Maximum 15 capsules per day.       aspirin 81 MG EC tablet     90 tablet    Take 1 tablet (81 mg) by mouth daily       BD SHARPS  Misc     1 each    1 Container as needed       blood glucose monitoring lancets     1 Box    Use to test blood sugar 6-8 times daily or as directed.       blood glucose monitoring test strip    PAT CONTOUR NEXT    200 strip "    Use to test blood sugar 6-8 times daily or as directed.       * BOOST HIGH PROTEIN Liqd      After above baseline labs are drawn, give: 6 mL/kg to maximum of 360 mL; the beverage is to be consumed within 5 minutes.       * BOOST HIGH PROTEIN Liqd      After above baseline labs are drawn, give: 6 mL/kg to maximum of 360 mL; the beverage is to be consumed within 5 minutes.       celecoxib 100 MG capsule    celeBREX    60 capsule    Take 1 capsule (100 mg) by mouth 2 times daily       cetirizine 10 MG tablet    zyrTEC     Take 10 mg by mouth daily       CONTOUR NEXT EZ MONITOR W/DEVICE Kit      1 Device 6 times daily       cyclobenzaprine 5 MG tablet    FLEXERIL    42 tablet    Take 5-10 mg by mouth three times per day as needed for muscle spasms.       diphenhydrAMINE 25 MG capsule    BENADRYL ALLERGY    56 capsule    Take 1 capsule (25 mg) by mouth every 6 hours as needed for itching or allergies       docusate sodium 100 MG capsule    COLACE    60 capsule    Take 1 capsule (100 mg) by mouth 2 times daily as needed for constipation,       glucagon 1 MG kit    GLUCAGON EMERGENCY    1 mg    Inject 1 mg into the muscle once for 1 dose       glucose 40 % Gel gel     3 Tube    Take 15-30 g by mouth every 15 minutes as needed for low blood sugar       Injection Device for insulin Tika    NOVOPEN ECHO    1 each    1 each daily as needed       * insulin aspart 100 UNIT/ML injection    NovoLOG PEN     aspart medium correction 1 unit per 50 > 130 every 4 hours ? For Pre-Meal  - 179 give 1 unit.  For Pre-Meal  - 230 give 2 units.  For Pre-Meal  - 281 give 3 units.  For Pre-Meal  - 332 give 4 units.       * insulin aspart 100 UNIT/ML injection    NovoLOG PENFILL    3 mL    Administer subcutaneously 0.5 unit per 45 grams of carbohydrates.       insulin glargine 100 UNIT/ML injection    LANTUS    3 mL    Inject 5 units subcutaneously every morning.       insulin pen needle 32G X 4 MM    BD KEN U/F     100 each    Use 6-8 times per day       LANsoprazole 30 MG CR capsule    PREVACID    30 capsule    Take 1 capsule (30 mg) by mouth daily Take 30-60 minutes before a meal.       levothyroxine 125 MCG tablet    SYNTHROID/LEVOTHROID    1 tablet    Take 1 tablet (125 mcg), by mouth daily before breakfast.       LORazepam 0.5 MG tablet    ATIVAN    20 tablet    Take 1 tablet (0.5 mg) by mouth every 6 hours as needed for anxiety       * Lubiprostone 8 MCG Caps capsule     90 capsule    Take 1 capsule (8 mcg) by mouth 2 times daily. Take 1 capsule by mouth po BID x 5 days. If no effect, increase to 2 capsules po BID x 5 days. If no effect, increase to 3 capsules po BID and continue until further instruction.       * lubiprostone 24 MCG capsule    AMITIZA    60 capsule    Take 1 capsule (24 mcg) by mouth 2 times daily (with meals)       multivitamin CF formula capsule    CHOICEFUL     Take 1 tablet by mouth daily       oxyCODONE 5 MG IR tablet    ROXICODONE    150 tablet    Take 1-2 tablets (5-10 mg) by mouth every 4 hours as needed for moderate to severe pain       polyethylene glycol Packet    MIRALAX/GLYCOLAX    7 packet    Take 17 g by mouth daily as needed for constipation       pregabalin 75 MG capsule    LYRICA    150 capsule    Take 1 capsule (75 mg) by mouth 2 times daily       prochlorperazine 5 MG tablet    COMPAZINE    90 tablet    Take two 5 mg tablets by mouth every six hours as needed for nausea.       senna-docusate 8.6-50 MG per tablet    SENOKOT-S;PERICOLACE    100 tablet    Take 1 tablet by mouth 2 times daily as needed for constipation       * Notice:  This list has 6 medication(s) that are the same as other medications prescribed for you. Read the directions carefully, and ask your doctor or other care provider to review them with you.

## 2017-05-18 DIAGNOSIS — Z90.410 ACQUIRED TOTAL ABSENCE OF PANCREAS: ICD-10-CM

## 2017-05-20 ENCOUNTER — HOSPITAL ENCOUNTER (EMERGENCY)
Facility: CLINIC | Age: 52
Discharge: HOME | End: 2017-05-20
Attending: EMERGENCY MEDICINE | Admitting: EMERGENCY MEDICINE
Payer: COMMERCIAL

## 2017-05-20 ENCOUNTER — TELEPHONE (OUTPATIENT)
Dept: TRANSPLANT | Facility: CLINIC | Age: 52
End: 2017-05-20

## 2017-05-20 ENCOUNTER — APPOINTMENT (OUTPATIENT)
Dept: CT IMAGING | Facility: CLINIC | Age: 52
End: 2017-05-20
Attending: EMERGENCY MEDICINE
Payer: COMMERCIAL

## 2017-05-20 VITALS
OXYGEN SATURATION: 99 % | HEART RATE: 84 BPM | SYSTOLIC BLOOD PRESSURE: 110 MMHG | HEIGHT: 68 IN | WEIGHT: 150 LBS | BODY MASS INDEX: 22.73 KG/M2 | TEMPERATURE: 97.8 F | DIASTOLIC BLOOD PRESSURE: 68 MMHG | RESPIRATION RATE: 18 BRPM

## 2017-05-20 DIAGNOSIS — R10.12 LUQ ABDOMINAL PAIN: ICD-10-CM

## 2017-05-20 LAB
ALBUMIN SERPL-MCNC: 3.5 G/DL (ref 3.4–5)
ALBUMIN UR-MCNC: NEGATIVE MG/DL
ALP SERPL-CCNC: 410 U/L (ref 40–150)
ALT SERPL W P-5'-P-CCNC: 113 U/L (ref 0–50)
ANION GAP SERPL CALCULATED.3IONS-SCNC: 7 MMOL/L (ref 3–14)
APPEARANCE UR: CLEAR
AST SERPL W P-5'-P-CCNC: 55 U/L (ref 0–45)
BASOPHILS # BLD AUTO: 0.1 10E9/L (ref 0–0.2)
BASOPHILS NFR BLD AUTO: 2.6 %
BILIRUB SERPL-MCNC: 1.2 MG/DL (ref 0.2–1.3)
BILIRUB UR QL STRIP: NEGATIVE
BUN SERPL-MCNC: 10 MG/DL (ref 7–30)
CALCIUM SERPL-MCNC: 9.3 MG/DL (ref 8.5–10.1)
CHLORIDE SERPL-SCNC: 105 MMOL/L (ref 94–109)
CO2 SERPL-SCNC: 29 MMOL/L (ref 20–32)
COLOR UR AUTO: NORMAL
CREAT BLD-MCNC: 0.6 MG/DL (ref 0.52–1.04)
CREAT SERPL-MCNC: 0.66 MG/DL (ref 0.52–1.04)
DIFFERENTIAL METHOD BLD: ABNORMAL
EOSINOPHIL # BLD AUTO: 0.3 10E9/L (ref 0–0.7)
EOSINOPHIL NFR BLD AUTO: 6.8 %
ERYTHROCYTE [DISTWIDTH] IN BLOOD BY AUTOMATED COUNT: 21.8 % (ref 10–15)
GFR SERPL CREATININE-BSD FRML MDRD: >90 ML/MIN/1.7M2
GFR SERPL CREATININE-BSD FRML MDRD: ABNORMAL ML/MIN/1.7M2
GLUCOSE SERPL-MCNC: 86 MG/DL (ref 70–99)
GLUCOSE UR STRIP-MCNC: NEGATIVE MG/DL
HCT VFR BLD AUTO: 36.3 % (ref 35–47)
HGB BLD-MCNC: 11.2 G/DL (ref 11.7–15.7)
HGB UR QL STRIP: NEGATIVE
IMM GRANULOCYTES # BLD: 0 10E9/L (ref 0–0.4)
IMM GRANULOCYTES NFR BLD: 0.3 %
INR PPP: 1.05 (ref 0.86–1.14)
KETONES UR STRIP-MCNC: NEGATIVE MG/DL
LEUKOCYTE ESTERASE UR QL STRIP: NEGATIVE
LIPASE SERPL-CCNC: 40 U/L (ref 73–393)
LYMPHOCYTES # BLD AUTO: 1.2 10E9/L (ref 0.8–5.3)
LYMPHOCYTES NFR BLD AUTO: 31.6 %
MCH RBC QN AUTO: 26.5 PG (ref 26.5–33)
MCHC RBC AUTO-ENTMCNC: 30.9 G/DL (ref 31.5–36.5)
MCV RBC AUTO: 86 FL (ref 78–100)
MONOCYTES # BLD AUTO: 0.6 10E9/L (ref 0–1.3)
MONOCYTES NFR BLD AUTO: 14.5 %
NEUTROPHILS # BLD AUTO: 1.7 10E9/L (ref 1.6–8.3)
NEUTROPHILS NFR BLD AUTO: 44.2 %
NITRATE UR QL: NEGATIVE
NRBC # BLD AUTO: 0 10*3/UL
NRBC BLD AUTO-RTO: 0 /100
PH UR STRIP: 7 PH (ref 5–7)
PLATELET # BLD AUTO: 583 10E9/L (ref 150–450)
POTASSIUM SERPL-SCNC: 3.8 MMOL/L (ref 3.4–5.3)
PROT SERPL-MCNC: 7.4 G/DL (ref 6.8–8.8)
RBC # BLD AUTO: 4.22 10E12/L (ref 3.8–5.2)
RBC #/AREA URNS AUTO: <1 /HPF (ref 0–2)
SODIUM SERPL-SCNC: 142 MMOL/L (ref 133–144)
SP GR UR STRIP: 1 (ref 1–1.03)
URN SPEC COLLECT METH UR: NORMAL
UROBILINOGEN UR STRIP-MCNC: NORMAL MG/DL (ref 0–2)
WBC # BLD AUTO: 3.8 10E9/L (ref 4–11)
WBC #/AREA URNS AUTO: 0 /HPF (ref 0–2)

## 2017-05-20 PROCEDURE — 96361 HYDRATE IV INFUSION ADD-ON: CPT | Performed by: EMERGENCY MEDICINE

## 2017-05-20 PROCEDURE — 85610 PROTHROMBIN TIME: CPT | Performed by: EMERGENCY MEDICINE

## 2017-05-20 PROCEDURE — 82565 ASSAY OF CREATININE: CPT

## 2017-05-20 PROCEDURE — 25000128 H RX IP 250 OP 636

## 2017-05-20 PROCEDURE — 25000128 H RX IP 250 OP 636: Performed by: EMERGENCY MEDICINE

## 2017-05-20 PROCEDURE — 25000125 ZZHC RX 250: Performed by: STUDENT IN AN ORGANIZED HEALTH CARE EDUCATION/TRAINING PROGRAM

## 2017-05-20 PROCEDURE — 80053 COMPREHEN METABOLIC PANEL: CPT | Performed by: EMERGENCY MEDICINE

## 2017-05-20 PROCEDURE — 83690 ASSAY OF LIPASE: CPT | Performed by: EMERGENCY MEDICINE

## 2017-05-20 PROCEDURE — 85025 COMPLETE CBC W/AUTO DIFF WBC: CPT | Performed by: EMERGENCY MEDICINE

## 2017-05-20 PROCEDURE — 96360 HYDRATION IV INFUSION INIT: CPT | Performed by: EMERGENCY MEDICINE

## 2017-05-20 PROCEDURE — 25000128 H RX IP 250 OP 636: Performed by: STUDENT IN AN ORGANIZED HEALTH CARE EDUCATION/TRAINING PROGRAM

## 2017-05-20 PROCEDURE — 81001 URINALYSIS AUTO W/SCOPE: CPT | Performed by: EMERGENCY MEDICINE

## 2017-05-20 PROCEDURE — 99285 EMERGENCY DEPT VISIT HI MDM: CPT | Mod: 25 | Performed by: EMERGENCY MEDICINE

## 2017-05-20 PROCEDURE — 74177 CT ABD & PELVIS W/CONTRAST: CPT

## 2017-05-20 PROCEDURE — 99285 EMERGENCY DEPT VISIT HI MDM: CPT | Mod: Z6 | Performed by: EMERGENCY MEDICINE

## 2017-05-20 RX ORDER — HEPARIN SODIUM (PORCINE) LOCK FLUSH IV SOLN 100 UNIT/ML 100 UNIT/ML
5 SOLUTION INTRAVENOUS ONCE
Status: COMPLETED | OUTPATIENT
Start: 2017-05-20 | End: 2017-05-20

## 2017-05-20 RX ORDER — IOPAMIDOL 755 MG/ML
92 INJECTION, SOLUTION INTRAVASCULAR ONCE
Status: COMPLETED | OUTPATIENT
Start: 2017-05-20 | End: 2017-05-20

## 2017-05-20 RX ORDER — HEPARIN SODIUM (PORCINE) LOCK FLUSH IV SOLN 100 UNIT/ML 100 UNIT/ML
SOLUTION INTRAVENOUS
Status: COMPLETED
Start: 2017-05-20 | End: 2017-05-20

## 2017-05-20 RX ADMIN — SODIUM CHLORIDE, PRESERVATIVE FREE 5 ML: 5 INJECTION INTRAVENOUS at 14:46

## 2017-05-20 RX ADMIN — HEPARIN SODIUM (PORCINE) LOCK FLUSH IV SOLN 100 UNIT/ML 5 ML: 100 SOLUTION at 14:46

## 2017-05-20 RX ADMIN — IOPAMIDOL 92 ML: 755 INJECTION, SOLUTION INTRAVENOUS at 12:37

## 2017-05-20 RX ADMIN — SODIUM CHLORIDE 1000 ML: 9 INJECTION, SOLUTION INTRAVENOUS at 11:51

## 2017-05-20 RX ADMIN — SODIUM CHLORIDE, PRESERVATIVE FREE 73 ML: 5 INJECTION INTRAVENOUS at 12:37

## 2017-05-20 ASSESSMENT — ENCOUNTER SYMPTOMS
NAUSEA: 0
APPETITE CHANGE: 1
VOMITING: 0
FEVER: 0
ABDOMINAL PAIN: 1
BACK PAIN: 0
COLOR CHANGE: 0
NECK STIFFNESS: 0
DYSURIA: 0
CONSTIPATION: 0
COUGH: 0
FREQUENCY: 0
LIGHT-HEADEDNESS: 0
HEMATURIA: 0
AGITATION: 0
POLYDIPSIA: 0
NECK PAIN: 0
CHILLS: 0
ADENOPATHY: 0
DIARRHEA: 0

## 2017-05-20 NOTE — ED PROVIDER NOTES
History     Chief Complaint   Patient presents with     Abdominal Pain     HPI  Dinora Mcghee is a 52 year old female with a history of chronic pancreatitis s/p TPAIT (12/28/2016), choledocholithiasis, gastroparesis, GERD and chronic abdominal pain who presents to the emergency department today with complaints of abdominal pain. Patient reported waking up this morning with sharp, stabbing, moderate, non-radiating LUQ abdominal pain. She states the pain is localized to the area of her prior J-tube, which was removed in March, 2017. She denies ever having had pain like this in the past. Patient does report doing some heavy lifting last night. Patient denies any nausea or vomiting, though does report a poor appetite. She denies diarrhea, reports having had a normal bowel movement yesterday afternoon. Movement makes the pain worse, rest improves it. No urinary complaints. Otherwise, no complaints of fever or cough. No other concerns or complaints.     Of note, patient underwent an MRI about a month ago which showed a nonspecific subcutaneous fluid collection in the left upper quadrant amidst the abdominal wall postsurgical changes. She has a past surgical history significant for TPAIT, splenectomy, appendectomy, TAHBSO and Vera-en-Y gastric bypass.     I have reviewed the Medications, Allergies, Past Medical and Surgical History, and Social History in the [facility] system.    Past Medical History:   Diagnosis Date     Allergic rhinitis, cause unspecified      Allergy to other foods     strawberries, apples, celeries, alice, watermelon     Arthritis     left knee     Choledocholithiasis     long after cholecystectomy     Chronic abdominal pain      Chronic constipation      Chronic nausea      Chronic pancreatitis (H)      Degeneration of lumbar or lumbosacral intervertebral disc      Esophageal reflux     w/ hiatal hernia     Gastroparesis      Hiatal hernia      History of pituitary adenoma     s/p resection      Hypothyroidism      Migraines      Mild hyperlipidemia      On tube feeding diet     presence of GJ tube     Pancreatic disease     PD stricture, 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 endometriosis growth     APPENDECTOMY       C  DELIVERY ONLY       C  DELIVERY ONLY      repeat c section with incidental cystotomy with repair     C EXCIS PITUITARY,TRANSNASAL/SEPTAL      pituitary tumor removed for diabetes insipidus     C TOTAL ABDOM HYSTERECTOMY      w/ bilateral salpingoophorectomy      C WRIST ARTHROSCOP,RELEASE XVERS LIG Bilateral 08      SECTION       COLONOSCOPY       ENDOSCOPIC RETROGRADE CHOLANGIOPANCREATOGRAM N/A 2015    Procedure: ENDOSCOPIC RETROGRADE CHOLANGIOPANCREATOGRAM;  Surgeon: Mandeep Park MD;  [facility]     ENDOSCOPIC RETROGRADE CHOLANGIOPANCREATOGRAM N/A 2016    Procedure: COMBINED ENDOSCOPIC RETROGRADE CHOLANGIOPANCREATOGRAPHY, PLACE TUBE/STENT;  Surgeon: Mandeep Park MD;  [facility]     ENDOSCOPIC RETROGRADE CHOLANGIOPANCREATOGRAM N/A 3/17/2016    Procedure: COMBINED ENDOSCOPIC RETROGRADE CHOLANGIOPANCREATOGRAPHY, REMOVE FOREIGN BODY OR STENT/TUBE;  Surgeon: Mandeep Park MD;  [facility]     ENDOSCOPIC RETROGRADE CHOLANGIOPANCREATOGRAM N/A 2016    Procedure: COMBINED ENDOSCOPIC RETROGRADE CHOLANGIOPANCREATOGRAPHY, PLACE TUBE/STENT;  Surgeon: Mandeep Park MD;  [facility]     ENDOSCOPIC RETROGRADE CHOLANGIOPANCREATOGRAM N/A 2016    Procedure: COMBINED ENDOSCOPIC RETROGRADE CHOLANGIOPANCREATOGRAPHY, REMOVE FOREIGN BODY OR STENT/TUBE;  Surgeon: Mandeep Park MD;  [facility]     ENDOSCOPIC ULTRASOUND UPPER  GASTROINTESTINAL TRACT (GI) N/A 10/3/2016    Procedure: ENDOSCOPIC ULTRASOUND, ESOPHAGOSCOPY / UPPER GASTROINTESTINAL TRACT (GI);  Surgeon: Guru Jose Rodas MD;  [facility]     ESOPHAGOSCOPY, GASTROSCOPY, DUODENOSCOPY (EGD), COMBINED N/A 6/24/2015    Procedure: COMBINED ESOPHAGOSCOPY, GASTROSCOPY, DUODENOSCOPY (EGD), REMOVE FOREIGN BODY;  Surgeon: Mandeep Park MD;  [facility]     ESOPHAGOSCOPY, GASTROSCOPY, DUODENOSCOPY (EGD), COMBINED N/A 10/25/2015    Procedure: COMBINED ESOPHAGOSCOPY, GASTROSCOPY, DUODENOSCOPY (EGD);  Surgeon: Sammy Amaro MD;  [facility]     ESOPHAGOSCOPY, GASTROSCOPY, DUODENOSCOPY (EGD), COMBINED N/A 10/25/2015    Procedure: COMBINED ESOPHAGOSCOPY, GASTROSCOPY, DUODENOSCOPY (EGD), BIOPSY SINGLE OR MULTIPLE;  Surgeon: Sammy Amaro MD;  [facility]     ESOPHAGOSCOPY, GASTROSCOPY, DUODENOSCOPY (EGD), DILATATION, COMBINED       EXCISE LESION TRUNK N/A 4/17/2017    Procedure: EXCISE LESION TRUNK;  Removal of Abdominal Foreign Body;  Surgeon: Nestor Phoenix MD;  [facility]     HC ESOPH/GAS REFLUX TEST W NASAL IMPED >1 HR N/A 11/19/2015    Procedure: ESOPHAGEAL IMPEDENCE FUNCTION TEST WITH 24 HOUR PH GREATER THAN 1 HOUR;  Surgeon: Thiago Apple MD;  [facility]     HC UGI ENDOSCOPY DIAG W BIOPSY  9/17/08     HC UGI ENDOSCOPY DIAG W BIOPSY  9/27/12     HC UGI ENDOSCOPY W ESOPHAGEAL DILATION BALLOON <30MM  9/17/08     HC UGI ENDOSCOPY W EUS N/A 5/5/2015    Procedure: COMBINED ENDOSCOPIC ULTRASOUND, ESOPHAGOSCOPY, GASTROSCOPY, DUODENOSCOPY (EGD);  Surgeon: Wm Dueñas MD;  [facility]     INJECT TRANSVERSUS ABDOMINIS PLANE (TAP) BLOCK BILATERAL Left 9/22/2016    Procedure: INJECT TRANSVERSUS ABDOMINIS PLANE (TAP) BLOCK BILATERAL;  Surgeon: Dickson Corrigan MD;  [facility]     laparoscopic pineda  1995     LAPAROSCOPIC PANCREATECTOMY, TRANSPLANT AUTO ISLET CELL N/A 12/28/2016    Procedure: LAPAROSCOPIC  PANCREATECTOMY, TRANSPLANT AUTO ISLET CELL;  Surgeon: Nestor Phoenix MD;  [facility]     transphenoidal pituitary resection  1990       Family History   Problem Relation Age of Onset     Eye Disorder Father      cataract, detached retina     Myocardial Infarction Father 60     Lipids Father      CEREBROVASCULAR DISEASE Father      Depression Father      Substance Abuse Father      Anesthesia Reaction Father      stroke right after surgery     Lipids Mother      Hypertension Mother      Thyroid Disease Mother      Eye Disorder Son      ptosis     Eye Disorder Paternal Grandmother      cataract     Eye Disorder Paternal Grandfather      cataract     DIABETES Paternal Grandfather      Eye Disorder Maternal Grandmother      cataract     Thyroid Disease Maternal Grandmother      Coronary Artery Disease Sister      Depression Sister      Depression Son      Anxiety Disorder Son      Thyroid Disease Sister      DIABETES Maternal Grandfather      Depression Nephew      Anxiety Disorder Nephew      Thyroid Disease Nephew      Type 2 Diabetes Cousin      paternal cousin       Social History   Substance Use Topics     Smoking status: Former Smoker     Packs/day: 0.50     Years: 6.00     Types: Cigarettes     Start date: 9/1/1985     Quit date: 1/1/1992     Smokeless tobacco: Never Used      Comment: no 2nd hand     Alcohol use No      Comment: last drink 6/2016  - moderate social     No current facility-administered medications for this encounter.      Current Outpatient Prescriptions   Medication     insulin pen needle (BD KEN U/F) 32G X 4 MM     celecoxib (CELEBREX) 100 MG capsule     Nutritional Supplements (BOOST HIGH PROTEIN) LIQD     lubiprostone (AMITIZA) 24 MCG capsule     insulin glargine (LANTUS) 100 UNIT/ML injection     cetirizine (ZYRTEC) 10 MG tablet     LORazepam (ATIVAN) 0.5 MG tablet     Lubiprostone 8 MCG CAPS capsule     insulin aspart (NOVOLOG PENFILL) 100 UNIT/ML injection     Nutritional  Supplements (BOOST HIGH PROTEIN) LIQD     cyclobenzaprine (FLEXERIL) 5 MG tablet     oxyCODONE (ROXICODONE) 5 MG IR tablet     amylase-lipase-protease (CREON 24) 13867 UNITS CPEP per EC capsule     insulin aspart (NOVOLOG PEN) 100 UNIT/ML injection     amitriptyline (ELAVIL) 10 MG tablet     senna-docusate (SENOKOT-S;PERICOLACE) 8.6-50 MG per tablet     prochlorperazine (COMPAZINE) 5 MG tablet     polyethylene glycol (MIRALAX/GLYCOLAX) Packet     diphenhydrAMINE (BENADRYL ALLERGY) 25 MG capsule     levothyroxine (SYNTHROID/LEVOTHROID) 125 MCG tablet     docusate sodium (COLACE) 100 MG capsule     multivitamin CF formula (CHOICEFUL) capsule     Blood Glucose Monitoring Suppl (CONTOUR NEXT EZ MONITOR) W/DEVICE KIT     pregabalin (LYRICA) 75 MG capsule     glucagon (GLUCAGON EMERGENCY) 1 MG kit     LANsoprazole (PREVACID) 30 MG CR capsule     aspirin 81 MG EC tablet     Injection Device for insulin (NOVOPEN ECHO) MARCO     glucose 40 % GEL gel     Sharps Container (BD SHARPS ) MISC     blood glucose monitoring ([employer] CONTOUR NEXT) test strip     blood glucose monitoring (PAT MICROLET) lancets        Allergies   Allergen Reactions     Apple Anaphylaxis     Depakote [Divalproex Sodium] Other (See Comments)     Chest pain     Zithromax [Azithromycin Dihydrate] Anaphylaxis     Noted in 4/7/08 ER     Darvocet [Propoxyphene N-Apap] Itching     Morphine Nausea and Vomiting and Rash     Nalbuphine Hcl Rash     RASH :nubaine     [place] Hives     Zosyn [Piperacillin-Tazobactam In D5w] Rash     Possible allergy, did have a diffuse rash that seemed drug related but could have also been related to soap in the hospital.      Bactrim [Sulfamethoxazole W-Trimethoprim] Other (See Comments) and Nausea and Vomiting     Severely low liver function.     Cats      Compazine [Prochlorperazine] Other (See Comments)     Twitching. Takes Benedryl and is fine     Corticosteroids Other (See Comments)     All oral,IV and injectable  "steroids are contraindicated (unless in life threatening situations) in [facility] transplant recipients. They can cause irreversible loss of islet cell function. Please contact patients transplant care coordinator Mecca Watkins RN BSN @ [phone]/Pager [phone], and Endocrinologist prior to administration.     Dust Mites      Grass      Prednisone Other (See Comments)     Insomnia       Ragweeds      Tape [Adhesive Tape] Blisters     Trees      Zofran [Ondansetron] Other (See Comments)     migraine       Review of Systems   Constitutional: Positive for appetite change. Negative for chills and fever.   HENT: Negative for congestion.    Eyes: Negative for visual disturbance.   Respiratory: Negative for cough.    Gastrointestinal: Positive for abdominal pain (LUQ). Negative for constipation, diarrhea, nausea and vomiting.   Endocrine: Negative for polydipsia and polyuria.   Genitourinary: Negative for dysuria, frequency, hematuria and urgency.   Musculoskeletal: Negative for back pain, neck pain and neck stiffness.   Skin: Negative for color change.   Neurological: Negative for light-headedness.   Hematological: Negative for adenopathy.   Psychiatric/Behavioral: Negative for agitation and behavioral problems.   All other systems reviewed and are negative.      Physical Exam   BP: 112/60  Pulse: 84  Temp: 97.8  F (36.6  C)  Resp: 18  Height: 172.7 cm (5' 8\")  Weight: 68 kg (150 lb)  SpO2: 98 %  Physical Exam   Constitutional: She is oriented to person, place, and time. She appears well-developed and well-nourished. No distress.   HENT:   Head: Normocephalic and atraumatic.   Mouth/Throat: Oropharynx is clear and moist. No oropharyngeal exudate.   Eyes: Conjunctivae and EOM are normal. No scleral icterus.   Neck: Normal range of motion.   Cardiovascular: Normal rate, normal heart sounds and intact distal pulses.    Pulmonary/Chest: Effort normal and breath sounds normal. No respiratory distress. She has no " wheezes. She has no rales.   Abdominal: Soft. Bowel sounds are normal. She exhibits no distension. There is tenderness in the left upper quadrant. There is no rebound, no guarding, no CVA tenderness, no tenderness at McBurney's point and negative Campbell's sign.   Musculoskeletal: Normal range of motion. She exhibits no edema or tenderness.   Neurological: She is alert and oriented to person, place, and time. No cranial nerve deficit. She exhibits normal muscle tone. Coordination normal.   Skin: Skin is warm. No rash noted. She is not diaphoretic.   Psychiatric: She has a normal mood and affect. Her behavior is normal. Judgment and thought content normal.   Nursing note and vitals reviewed.      ED Course     ED Course     Procedures   11:23 AM  The patient was seen and examined by Dr. Holman in Room ED07.              Critical Care time:  none               Labs Ordered and Resulted from Time of ED Arrival Up to the Time of Departure from the ED   CBC WITH PLATELETS DIFFERENTIAL - Abnormal; Notable for the following:        Result Value    WBC 3.8 (*)     Hemoglobin 11.2 (*)     MCHC 30.9 (*)     RDW 21.8 (*)     Platelet Count 583 (*)     All other components within normal limits   COMPREHENSIVE METABOLIC PANEL - Abnormal; Notable for the following:     Alkaline Phosphatase 410 (*)      (*)     AST 55 (*)     All other components within normal limits   LIPASE - Abnormal; Notable for the following:     Lipase 40 (*)     All other components within normal limits   INR   ROUTINE [facility] WITH MICROSCOPIC   CREATININE POCT   PERIPHERAL IV CATHETER   ISTAT CREATININE NURSING POCT   CENTRAL VENOUS CATHETER            Assessments & Plan (with Medical Decision Making)   52 year old woman presenting with left upper quadrant pain after heavy lifting. Differential diagnosis: muscle strain, abdominal wall hematoma, ruptured intraabdominal fluid collection.    After thorough history and physical exam patient appears to be in  no acute distress. She declined pain medications and nausea medications at this point stating that she is rather comfortable. I will obtain laboratory studies and CT abdomen and pelvis for further diagnostic evaluation and hydrate the patient with a bolus of IV saline. She and  agree with the plan.     Patient s laboratory studies returned with slight leukopenia with WBC of 3800. There is mild anemia with hemoglobin of 11.2, however, this is at patient s baseline. She also has thrombocytosis with chronically elevated platelets, currently at 583,000. Electrolytes show no evidence of dehydration, creatinine is normal at 0.66. LFTs are elevated which is also not a new finding for the patient. Lipase is not elevated, there is no evidence of chronic pancreatitis. UA shows no evidence of infection.    I reviewed patient s CT of the abdomen/pelvis and I read the radiology report; there is fat stranding of the left anterior abdominal wall which is nonspecific. On reexamination of the patient there is no evidence of abdominal wall cellulitis whatsoever. CT scan also shows no evidence of bowel obstruction. She does have diverticulosis, however, no evidence of diverticulitis. On reexamination patient s abdomen is soft, nontender, nondistended. She and her  were informed about these findings and at this point she is stable for discharge. they agree with the plan to return if her symptoms worsen of she develops a fever.     I have reviewed the nursing notes.    I have reviewed the findings, diagnosis, plan and need for follow up with the patient.    Discharge Medication List as of 5/20/2017  2:38 PM          Final diagnoses:   LUQ abdominal pain   Ghada STANLEY, am serving as a trained medical scribe to document services personally performed by Mika Holman MD, based on the provider's statements to me.      IMika MD, was physically present and have reviewed and verified the accuracy of this note  documented by Ghada Dukes.       5/20/2017   [facility], [place], EMERGENCY DEPARTMENT     Mika Holman MD  05/20/17 5309

## 2017-05-20 NOTE — ED AVS SNAPSHOT
[facility], [place], Emergency Department    [address] [place] [id number]    Phone:  [phone]                                       Dinora Mcghee   MRN: [id number]    Department:  [facility], Emergency Department   Date of Visit:  5/20/2017           After Visit Summary Signature Page     I have received my discharge instructions, and my questions have been answered. I have discussed any challenges I see with this plan with the nurse or doctor.    ..........................................................................................................................................  Patient/Patient Representative Signature      ..........................................................................................................................................  Patient Representative Print Name and Relationship to Patient    ..................................................               ................................................  Date                                            Time    ..........................................................................................................................................  Reviewed by Signature/Title    ...................................................              ..............................................  Date                                                            Time

## 2017-05-20 NOTE — TELEPHONE ENCOUNTER
Received triage call from patient re: new onset sharp abdominal pain, started today.  Denies fevers, nausea, vomiting.  Reports feeling bloated and not able to pass much flatus today.   Did have normal BM x2 yesterday.  Patient reports pain around area of old J-tube site.    Discussed with Dr. Sabrina chicas bring patient to ED for imaging, report called to Yuliya in ED with ETA around 1100.

## 2017-05-20 NOTE — ED NOTES
"Triage Assessment & Note:    /60  Pulse 84  Temp 97.8  F (36.6  C) (Oral)  Resp 18  Ht 1.727 m (5' 8\")  Wt 68 kg (150 lb)  SpO2 98%  BMI 22.81 kg/m2    Patient presents with: LQU abdominal pain. PT reports lifting heavy objects yesterday and now has abdominal pain.     Home Treatments/Remedies: none    Febrile / Afebrile? afebrile    Duration of C/o:  0700 this AM    Neftaly Engle  May 20, 2017      "

## 2017-05-20 NOTE — ED NOTES
Bed: IN02  Expected date: 17  Expected time: 11:00 AM  Means of arrival: Car  Comments:  Dinora Larson [birth year] hx of Islet tx Dec '16.  Pt c/o new onset acute abdominal pain, bloating and decreased passing gas today. Normal BM x 2 yesterday.  Transplant MD requesting imaging.

## 2017-05-20 NOTE — ED AVS SNAPSHOT
[facility], Emergency Department    [address], [place] [address]    Phone:  [phone]                                       Dinora Mcghee   MRN: [id number]    Department:  [facility], Emergency Department   Date of Visit:  5/20/2017           Patient Information     Date Of Birth          1965        Your diagnoses for this visit were:     LUQ abdominal pain        You were seen by Mika Holman MD.        Discharge Instructions       Please make an appointment to follow up with Your Primary Care Provider in 2-3 days if you have any concerns. If your symptoms worsen or you develop a fever of 100.4 degrees Farenheit or higher please come back to the emergency department for evaluation. Please take pain medications that you have at home, if needed.      *Abdominal Pain, Unknown Cause (Female)    The exact cause of your abdominal (stomach) pain is not certain. This does not mean that this is something to worry about, or the right tests were not done. Everyone likes to know the exact cause of the problem, but sometimes with abdominal pain, there is no clear-cut cause, and this could be a good thing. The good news is that your symptoms can be treated, and you will feel better.   Your condition does not seem serious now; however, sometimes the signs of a serious problem may take more time to appear. For this reason, it is important for you to watch for any new symptoms, problems, or worsening of your condition.  Over the next few days, the abdominal pain may come and go, or be continuous. Other common symptoms can include nausea and vomiting. Sometimes it can be difficult to tell if you feel nauseous, you may just feel bad and not associate that feeling with nausea. Constipation, diarrhea, and a fever may go along with the pain.  The pain may continue even if treated correctly over the following days. Depending on how things go, sometimes the cause can become clear and may require further  or different treatment. Additional evaluations, medications, or tests may be needed.  Home care  Your health care provider may prescribe medications for pain, symptoms, or an infection.  Follow the health care provider's instructions for taking these medications.  General care    Rest until your next exam. No strenuous activities.    Try to find positions that ease discomfort. A small pillow placed on the abdomen may help relieve pain.    Something warm on your abdomen (such as a heating pad) may help, but be careful not to burn yourself.  Diet    Do not force yourself to eat, especially if having cramps, vomiting, or diarrhea.    Water is important so you do not get dehydrated. Soup may also be good. Sports drinks may also help, especially if they are not too acidic. Make sure you don't drink sugary drinks as this can make things worse. Take liquids in small amounts. Do not guzzle them.    Caffeine sometimes makes the pain and cramping worse.    Avoid dairy products if you have vomiting or diarrhea.    Don't eat large amounts at a time. Wait a few minutes between bites.    Eat a diet low in fiber (called a low-residue diet). Foods allowed include refined breads, white rice, fruit and vegetable juices without pulp, tender meats. These foods will pass more easily through the intestine.    Avoid fried or fatty foods, dairy, alcohol and spicy foods until your symptoms go away.  Follow-up care  Follow up with your health care provider as instructed, or if your pain does not begin to improve in the next 24 hours.  When to seek medical care  Seek prompt medical care if any of the following occur:    Pain gets worse or moves to the right lower abdomen    New or worsening vomiting or diarrhea    Swelling of the abdomen    Unable to pass stool for more than three days    New fever over 101  F (38.3 C), or rising fever    Blood in vomit or bowel movements (dark red or black color)    Jaundice (yellow color of eyes and  skin)    Weakness, dizziness    Chest, arm, back, neck or jaw pain    Unexpected vaginal bleeding or missed period  Call 911  Call emergency services if any of the following occur:    Trouble breathing    Confusion    Fainting or loss of consciousness    Rapid heart rate    Seizure    [id number] [employer], [address] [place] [address], [place] [address]. All rights reserved. This information is not intended as a substitute for professional medical care. Always follow your healthcare professional's instructions.            Future Appointments        Provider Department Dept Phone Center    5/31/2017 10:00 AM Maria Elena Abdalla PhD [facility] [phone] [facility]    5/31/2017 11:30 AM Dian Armijo MD [facility] Ear Nose and Throat [phone] [facility]    6/19/2017 7:00 AM Sebastian López [facility] [phone] [facility]    6/20/2017 8:30 AM Lab [facility] Lab [phone] [facility]    6/20/2017 9:00 AM Transplant Nurse [facility] Solid Organ Transplant [phone] [facility]    6/20/2017 2:00 PM Nestor [place] MD [facility] Solid Organ Transplant [phone] [facility]    8/4/2017 9:00 AM Jesse Richardson [facility] Rheumatology [phone] [facility]    8/14/2017 7:45 AM Lab [facility] Lab [phone] [facility]    8/14/2017 8:50 AM Ara Lugo MD [facility] Hepatology [phone] [facility]      24 Hour Appointment Hotline       To make an appointment at any [facility], call [phone] ([phone]). If you don't have a family doctor or clinic, we will help you find one. [facility] clinics are conveniently located to serve the needs of you and your family.             Review of your medicines      Our records show that you are taking the medicines listed below. If these are incorrect, please call your family doctor or clinic.        Dose / Directions Last dose taken    amitriptyline 10 MG tablet   Commonly known as:  ELAVIL   Dose:  20 mg    Quantity:  60 tablet        Take 2 tablets (20 mg) by mouth At Bedtime   Refills:  3        amylase-lipase-protease 60177 UNITS Cpep per EC capsule   Commonly known as:  CREON 24   Quantity:  180 capsule        Take 3-4 capsules by mouth with meals and 1-2 with snacks. Maximum 15 capsules per day.   Refills:  3        aspirin 81 MG EC tablet   Dose:  81 mg   Quantity:  90 tablet        Take 1 tablet (81 mg) by mouth daily   Refills:  3        BD SHARPS  Misc   Dose:  1 Container   Quantity:  1 each        1 Container as needed   Refills:  1        blood glucose monitoring lancets   Quantity:  1 Box        Use to test blood sugar 6-8 times daily or as directed.   Refills:  prn        blood glucose monitoring test strip   Commonly known as:  PAT CONTOUR NEXT   Quantity:  200 strip        Use to test blood sugar 6-8 times daily or as directed.   Refills:  prn        * BOOST HIGH PROTEIN Liqd        After above baseline labs are drawn, give: 6 mL/kg to maximum of 360 mL; the beverage is to be consumed within 5 minutes.   Refills:  0        * BOOST HIGH PROTEIN Liqd        After above baseline labs are drawn, give: 6 mL/kg to maximum of 360 mL; the beverage is to be consumed within 5 minutes.   Refills:  0        celecoxib 100 MG capsule   Commonly known as:  celeBREX   Dose:  100 mg   Quantity:  60 capsule        Take 1 capsule (100 mg) by mouth 2 times daily   Refills:  1        cetirizine 10 MG tablet   Commonly known as:  zyrTEC   Dose:  10 mg        Take 10 mg by mouth daily   Refills:  0        CONTOUR NEXT EZ MONITOR W/DEVICE Kit   Dose:  1 Device        1 Device 6 times daily   Refills:  0        cyclobenzaprine 5 MG tablet   Commonly known as:  FLEXERIL   Quantity:  42 tablet        Take 5-10 mg by mouth three times per day as needed for muscle spasms.   Refills:  3        diphenhydrAMINE 25 MG capsule   Commonly known as:  BENADRYL ALLERGY   Dose:  25 mg   Quantity:  56 capsule        Take 1  capsule (25 mg) by mouth every 6 hours as needed for itching or allergies   Refills:  0        docusate sodium 100 MG capsule   Commonly known as:  COLACE   Quantity:  60 capsule        Take 1 capsule (100 mg) by mouth 2 times daily as needed for constipation,   Refills:  0        glucagon 1 MG kit   Commonly known as:  GLUCAGON EMERGENCY   Dose:  1 mg   Quantity:  1 mg        Inject 1 mg into the muscle once for 1 dose   Refills:  1        glucose 40 % Gel gel   Dose:  15-30 g   Quantity:  3 Tube        Take 15-30 g by mouth every 15 minutes as needed for low blood sugar   Refills:  2        Injection Device for insulin Tika   Commonly known as:  NOVOPEN ECHO   Dose:  1 Device   Quantity:  1 each        1 each daily as needed   Refills:  0        * insulin aspart 100 UNIT/ML injection   Commonly known as:  NovoLOG PEN        aspart medium correction 1 unit per 50 > 130 every 4 hours ? For Pre-Meal  - 179 give 1 unit.  For Pre-Meal  - 230 give 2 units.  For Pre-Meal  - 281 give 3 units.  For Pre-Meal  - 332 give 4 units.   Refills:  3        * insulin aspart 100 UNIT/ML injection   Commonly known as:  NovoLOG PENFILL   Quantity:  3 mL        Administer subcutaneously 0.5 unit per 45 grams of carbohydrates.   Refills:  3        insulin glargine 100 UNIT/ML injection   Commonly known as:  LANTUS   Quantity:  3 mL        Inject 5 units subcutaneously every morning.   Refills:  3        insulin pen needle 32G X 4 MM   Commonly known as:  BD KEN U/F   Quantity:  100 each        Use 6-8 times per day   Refills:  11        LANsoprazole 30 MG CR capsule   Commonly known as:  PREVACID   Dose:  30 mg   Quantity:  30 capsule        Take 1 capsule (30 mg) by mouth daily Take 30-60 minutes before a meal.   Refills:  11        levothyroxine 125 MCG tablet   Commonly known as:  SYNTHROID/LEVOTHROID   Quantity:  1 tablet        Take 1 tablet (125 mcg), by mouth daily before breakfast.   Refills:  0         LORazepam 0.5 MG tablet   Commonly known as:  ATIVAN   Dose:  0.5 mg   Quantity:  20 tablet        Take 1 tablet (0.5 mg) by mouth every 6 hours as needed for anxiety   Refills:  0        * Lubiprostone 8 MCG Caps capsule   Quantity:  90 capsule        Take 1 capsule (8 mcg) by mouth 2 times daily. Take 1 capsule by mouth po BID x 5 days. If no effect, increase to 2 capsules po BID x 5 days. If no effect, increase to 3 capsules po BID and continue until further instruction.   Refills:  3        * lubiprostone 24 MCG capsule   Commonly known as:  AMITIZA   Dose:  24 mcg   Quantity:  60 capsule        Take 1 capsule (24 mcg) by mouth 2 times daily (with meals)   Refills:  3        multivitamin CF formula capsule   Commonly known as:  CHOICEFUL   Dose:  1 tablet        Take 1 tablet by mouth daily   Refills:  11        oxyCODONE 5 MG IR tablet   Commonly known as:  ROXICODONE   Dose:  5-10 mg   Quantity:  150 tablet        Take 1-2 tablets (5-10 mg) by mouth every 4 hours as needed for moderate to severe pain   Refills:  0        polyethylene glycol Packet   Commonly known as:  MIRALAX/GLYCOLAX   Dose:  17 g   Quantity:  7 packet        Take 17 g by mouth daily as needed for constipation   Refills:  11        pregabalin 75 MG capsule   Commonly known as:  LYRICA   Dose:  75 mg   Quantity:  150 capsule        Take 1 capsule (75 mg) by mouth 2 times daily   Refills:  3        prochlorperazine 5 MG tablet   Commonly known as:  COMPAZINE   Quantity:  90 tablet        Take two 5 mg tablets by mouth every six hours as needed for nausea.   Refills:  3        senna-docusate 8.6-50 MG per tablet   Commonly known as:  SENOKOT-S;PERICOLACE   Dose:  1 tablet   Quantity:  100 tablet        Take 1 tablet by mouth 2 times daily as needed for constipation   Refills:  0        * Notice:  This list has 6 medication(s) that are the same as other medications prescribed for you. Read the directions carefully, and ask your doctor or other  care provider to review them with you.            Procedures and tests performed during your visit     CBC with platelets differential    CT Abdomen Pelvis w Contrast    Comprehensive metabolic panel    Creatinine POCT    INR    ISTAT creatinine  POCT    Lipase    Peripheral IV catheter    UA with Microscopic      Orders Needing Specimen Collection     None      Pending Results     No orders found from 5/18/2017 to 5/21/2017.            Pending Culture Results     No orders found from 5/18/2017 to 5/21/2017.            Pending Results Instructions     If you had any lab results that were not finalized at the time of your Discharge, you can call the ED Lab Result RN at [phone]. You will be contacted by this team for any positive Lab results or changes in treatment. The nurses are available 7 days a week from 10A to 6:30P.  You can leave a message 24 hours per day and they will return your call.        Thank you for choosing [facility]       Thank you for choosing [facility] for your care. Our goal is always to provide you with excellent care. Hearing back from our patients is one way we can continue to improve our services. Please take a few minutes to complete the written survey that you may receive in the mail after you visit with us. Thank you!        [employer] Information     [employer] gives you secure access to your electronic health record. If you see a primary care provider, you can also send messages to your care team and make appointments. If you have questions, please call your primary care clinic.  If you do not have a primary care provider, please call [phone] and they will assist you.        Care EveryWhere ID     This is your Care EveryWhere ID. This could be used by other organizations to access your [facility] medical records  [id number]        After Visit Summary       This is your record. Keep this with you and show to your community pharmacist(s) and doctor(s) at your next visit.

## 2017-05-20 NOTE — DISCHARGE INSTRUCTIONS
Please make an appointment to follow up with Your Primary Care Provider in 2-3 days if you have any concerns. If your symptoms worsen or you develop a fever of 100.4 degrees Farenheit or higher please come back to the emergency department for evaluation. Please take pain medications that you have at home, if needed.      *Abdominal Pain, Unknown Cause (Female)    The exact cause of your abdominal (stomach) pain is not certain. This does not mean that this is something to worry about, or the right tests were not done. Everyone likes to know the exact cause of the problem, but sometimes with abdominal pain, there is no clear-cut cause, and this could be a good thing. The good news is that your symptoms can be treated, and you will feel better.   Your condition does not seem serious now; however, sometimes the signs of a serious problem may take more time to appear. For this reason, it is important for you to watch for any new symptoms, problems, or worsening of your condition.  Over the next few days, the abdominal pain may come and go, or be continuous. Other common symptoms can include nausea and vomiting. Sometimes it can be difficult to tell if you feel nauseous, you may just feel bad and not associate that feeling with nausea. Constipation, diarrhea, and a fever may go along with the pain.  The pain may continue even if treated correctly over the following days. Depending on how things go, sometimes the cause can become clear and may require further or different treatment. Additional evaluations, medications, or tests may be needed.  Home care  Your health care provider may prescribe medications for pain, symptoms, or an infection.  Follow the health care provider's instructions for taking these medications.  General care    Rest until your next exam. No strenuous activities.    Try to find positions that ease discomfort. A small pillow placed on the abdomen may help relieve pain.    Something warm on your  abdomen (such as a heating pad) may help, but be careful not to burn yourself.  Diet    Do not force yourself to eat, especially if having cramps, vomiting, or diarrhea.    Water is important so you do not get dehydrated. Soup may also be good. Sports drinks may also help, especially if they are not too acidic. Make sure you don't drink sugary drinks as this can make things worse. Take liquids in small amounts. Do not guzzle them.    Caffeine sometimes makes the pain and cramping worse.    Avoid dairy products if you have vomiting or diarrhea.    Don't eat large amounts at a time. Wait a few minutes between bites.    Eat a diet low in fiber (called a low-residue diet). Foods allowed include refined breads, white rice, fruit and vegetable juices without pulp, tender meats. These foods will pass more easily through the intestine.    Avoid fried or fatty foods, dairy, alcohol and spicy foods until your symptoms go away.  Follow-up care  Follow up with your health care provider as instructed, or if your pain does not begin to improve in the next 24 hours.  When to seek medical care  Seek prompt medical care if any of the following occur:    Pain gets worse or moves to the right lower abdomen    New or worsening vomiting or diarrhea    Swelling of the abdomen    Unable to pass stool for more than three days    New fever over 101  F (38.3 C), or rising fever    Blood in vomit or bowel movements (dark red or black color)    Jaundice (yellow color of eyes and skin)    Weakness, dizziness    Chest, arm, back, neck or jaw pain    Unexpected vaginal bleeding or missed period  Call 911  Call emergency services if any of the following occur:    Trouble breathing    Confusion    Fainting or loss of consciousness    Rapid heart rate    Seizure    [id number] [employer], [address] [facility], [place] [address]. All rights reserved. This information is not intended as a substitute for professional medical care. Always follow  your healthcare professional's instructions.

## 2017-05-22 ENCOUNTER — TELEPHONE (OUTPATIENT)
Dept: TRANSPLANT | Facility: CLINIC | Age: 52
End: 2017-05-22

## 2017-05-22 NOTE — TELEPHONE ENCOUNTER
Medication  Name Dosage Unit Notes Date/Time Tags   Lantus 5.0 [id number]  May 20, 2017 8:00:57 AM    Novalog 0.5 [id number] Cover Chinese food May 19, 2017 5:20:00 PM    Lantus 5.0 [id number]  May 19, 2017 9:35:00 AM    Novalog 1.0 [id number] Correction May 19, 2017 12:05:00 AM    Novalog 1.5 [id number] Rice, stir jackson, fruit & dq May 18, 2017 7:30:00 PM    Lantus 5.0 [id number] Slept 10 hours! Missed alarm. May 18, 2017 11:05:00 AM    Lantus 5.0 [id number]  May 17, 2017 8:56:29 AM    Novalog 1.0 [id number] Other 1/2 salad and fresh fruit coverage May 16, 2017 8:39:49 PM    Novalog 0.5 [id number] 1/2 Chipotle Chicken Salad May 16, 2017 5:30:00 PM    Lantus 5.0 [id number]  May 16, 2017 8:37:28 AM    Blood Sugar  Concentration Event Notes Date/Time Tags   116 mg/dL BeforeSleep  May 20, 2017 10:44:59 PM    111 mg/dL BeforeDinner  May 20, 2017 6:51:00 PM    87 mg/dL Fasting After ED visit, before 1st food of the day May 20, 2017 2:52:00 PM    104 mg/dL Fasting  May 20, 2017 11:45:00 AM    111 mg/dL AfterSleep Severe abdominal pain.  May 20, 2017 7:57:00 AM    125 mg/dL BeforeSleep  May 19, 2017 9:31:00 PM    122 mg/dL BeforeDinner  May 19, 2017 5:30:00 PM    90 mg/dL BeforeLunch  May 19, 2017 12:59:00 PM    93 mg/dL AfterSleep  May 19, 2017 9:27:47 AM    143 mg/dL BeforeSleep Correction  May 18, 2017 11:58:00 PM    97 mg/dL BeforeDinner  May 18, 2017 7:37:00 PM    89 mg/dL BeforeLunch  May 18, 2017 3:51:00 PM    93 mg/dL AfterSleep  May 18, 2017 11:10:00 AM    104 mg/dL BeforeSleep  May 17, 2017 9:15:00 PM    115 mg/dL BeforeDinner  May 17, 2017 5:50:00 PM    108 mg/dL BeforeLunch  May 17, 2017 1:03:00 PM    119 mg/dL BeforeBreakfast  May 17, 2017 8:56:19 AM    117 mg/dL BeforeSleep  May 16, 2017 10:08:00 PM    94 mg/dL BeforeDinner  May 16, 2017 5:22:00 PM    89 mg/dL BeforeLunch  May 16, 2017 12:29:00 PM    115 mg/dL BeforeBreakfast  May 16, 2017 8:36:00 AM    114 mg/dL BeforeSleep  May 15, 2017 10:20:00 PM    [facility]  May 15, 2017 6:10:00 PM    125 [facility]  May 15, 2017 11:42:00 AM

## 2017-05-22 NOTE — TELEPHONE ENCOUNTER
Followed up with Dinora this morning to see how she is feeling after her ED visit for abdominal pain. She reports feeling much better and thinks she just lifted too many heavier boxes as she was working in the attic with her . She has felt twinges in the same area but nothing severe. She experienced some twinges in her abdominal area Sunday while doing dishes and decided to go lie down and rest. Flexeril has helped with this pain as does the heating pad.    She decided to limit her activity for a few days to see if this all resolves. She will discuss with her PT Wednesday. This RN suggested she try her abdominal binder when doing activity to see if this helps provide support to her abdominal muscles. She will consider this.     Dinora voiced no additional concerns or questions.

## 2017-05-24 ASSESSMENT — ACTIVITIES OF DAILY LIVING (ADL): I_KEEP_THINKING_ABOUT_HOW_BADLY_I_WANT_THE_PAIN_TO_STOP: 0 = NOT AT ALL

## 2017-05-30 ENCOUNTER — TELEPHONE (OUTPATIENT)
Dept: TRANSPLANT | Facility: CLINIC | Age: 52
End: 2017-05-30

## 2017-05-30 NOTE — TELEPHONE ENCOUNTER
Food  Nothing.  Blood Sugar  Concentration Event Notes Date/Time Tags   97 mg/dL BeforeSleep  May 29, 2017 10:04:00 PM    95 mg/dL BeforeDinner  May 29, 2017 5:56:00 PM    109 mg/dL BeforeLunch  May 29, 2017 1:08:00 PM    103 mg/dL AfterSleep  May 29, 2017 9:29:57 AM    125 mg/dL Other  May 28, 2017 10:01:00 PM    105 mg/dL BeforeDinner  May 28, 2017 5:32:00 PM    129 mg/dL BeforeLunch  May 28, 2017 1:43:07 PM    122 mg/dL AfterSleep  May 28, 2017 9:46:00 AM    107 mg/dL BeforeSleep  May 27, 2017 9:41:00 PM    99 mg/dL BeforeDinner  May 27, 2017 4:09:00 PM    101 mg/dL BeforeLunch  May 27, 2017 1:34:00 PM    110 mg/dL AfterSleep  May 27, 2017 8:59:00 AM    102 mg/dL Other Snack popcorn May 26, 2017 9:31:00 PM    105 mg/dL BeforeDinner  May 26, 2017 4:16:00 PM    122 mg/dL BeforeLunch  May 26, 2017 12:34:00 PM    99 mg/dL AfterSleep  May 26, 2017 8:44:00 AM    127 mg/dL BeforeSleep  May 26, 2017 12:23:00 AM    85 mg/dL BeforeDinner Crackers & cheese, Steak, potato, peas  May 25, 2017 9:41:40 PM    136 mg/dL BeforeDinner  May 25, 2017 4:57:29 PM    123 mg/dL BeforeLunch  May 25, 2017 12:05:00 PM    115 mg/dL AfterSleep  May 25, 2017 8:23:40 AM    150 mg/dL BeforeSleep  May 25, 2017 12:06:59 AM    90 mg/dL BeforeDinner *had dry needling this afternoon, was advised that it can change my chemistry and to watch my blood sugars. May 24, 2017 5:00:00 PM    123 mg/dL BeforeLunch  May 24, 2017 1:24:00 PM    105 mg/dL AfterSleep  May 24, 2017 9:08:48 AM    123 mg/dL BeforeSleep  May 24, 2017 12:37:00 AM    104 mg/dL BeforeDinner  May 23, 2017 7:34:40 PM    102 mg/dL Other Before exercize  May 23, 2017 4:52:00 PM    123 mg/dL BeforeLunch  May 23, 2017 11:49:00 AM    116 mg/dL AfterSleep  May 23, 2017 8:06:00 AM    117 mg/dL BeforeSleep  May 22, 2017 11:36:12 PM    Medication  Name Dosage Unit Notes Date/Time Tags   Lantus 5.0 [id number]  May 29, 2017 9:33:03 AM    Lantus 5.0 [id number]  May 28, 2017 9:40:00 AM    Novalog 1.0  [id number] Boasted chicken, carrots & fruit salad May 27, 2017 7:16:04 PM    Lantus 5.0 [id number]  May 27, 2017 8:57:00 AM    Novalog 0.5 [id number] Cover strawberry & raspberry yogurt parfait  May 26, 2017 11:26:46 PM    Novalog 0.5 [id number] Cover BLT & fruit May 26, 2017 7:30:00 PM    Lantus 5.0 [id number]  May 26, 2017 8:30:00 AM    Novalog 1.0 [id number] Coverage: Crackers & cheese, Steak, potato, peas, carrots, zero water, 2 small coconut almond chocolates May 25, 2017 8:30:00 PM    Novalog 1.0 [id number]  May 25, 2017 4:57:43 PM    Lantus 5.0 [id number]  May 25, 2017 8:23:53 AM    Novalog 1.0 [id number]  May 25, 2017 12:08:42 AM    Lantus 5.0 [id number]  May 24, 2017 9:09:00 AM    Lantus 5.0 [id number]  May 23, 2017 8:09:35 AM    Weight  Weight Notes Date/Time Tags   147 lb  May 29, 2017 9:30:00 AM    147.4 lb  May 25, 2017 8:24:09 AM

## 2017-05-31 ENCOUNTER — OFFICE VISIT (OUTPATIENT)
Dept: ANESTHESIOLOGY | Facility: CLINIC | Age: 52
End: 2017-05-31

## 2017-05-31 ENCOUNTER — OFFICE VISIT (OUTPATIENT)
Dept: OTOLARYNGOLOGY | Facility: CLINIC | Age: 52
End: 2017-05-31

## 2017-05-31 DIAGNOSIS — F43.89 ADJUSTMENT REACTION TO CHRONIC STRESS: Primary | ICD-10-CM

## 2017-05-31 DIAGNOSIS — M35.00 SJOGREN'S SYNDROME (H): Primary | ICD-10-CM

## 2017-05-31 ASSESSMENT — PAIN SCALES - GENERAL: PAINLEVEL: NO PAIN (0)

## 2017-05-31 NOTE — PROGRESS NOTES
Patient seen for lip biopsy to evaluate for IgG4 or Sjogren's syndrome.    Procedure discussed and signed consent obtained.    Lower lip anesthetized with 1% lidocaine with 1:100,000 epinephrine.  Small superficial incision made in inner aspect of lower lip and several salivary glands removed.  Incision closed with vicryl.      Follow up with rheumatologist for results interpretation.

## 2017-05-31 NOTE — LETTER
5/31/2017       RE: Dinora Mcghee  [address] [facility] [address]     Dear Colleague,    Thank you for referring your patient, Dinora Mcghee, to the [facility] at [facility]. Please see a copy of my visit note below.    Patient seen for lip biopsy to evaluate for IgG4 or Sjogren's syndrome.    Procedure discussed and signed consent obtained.    Lower lip anesthetized with 1% lidocaine with 1:100,000 epinephrine.  Small superficial incision made in inner aspect of lower lip and several salivary glands removed.  Incision closed with vicryl.      Follow up with rheumatologist for results interpretation.   Please see results and inform patient    Again, thank you for allowing me to participate in the care of your patient.      Sincerely,    Dian Armijo MD
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
## 2017-05-31 NOTE — PROGRESS NOTES
[facility]   Comprehensive Pain Program   Psychology Progress Note    Patient Name: Dinora Mcghee    YOB: 1965   Medical Record Number: [id number]  Date: 5/31/2017              SUBJECTIVE             Interval history: sleeping much better,  Normal.  Had assertive talk with  about not rushing her return to work or applying for jobs.  Still easily fatigued.      Her islets are working  And she is thrilled.   But  Today has Bx to diagnose  Auto immune disease.  and now worrying about  Her liver as a result.        OBJECTIVE:              Length of Visit: 45 minutes        Mental status: Mild Distress         Session objective:   Continued behavioral pain management skill improvement         Behavioral inventions and response:                            CBT  Pacing and how to achieve in a adrenalin junkie.   Also how to help structure to achieve exercise                                                  ASSESSMENT          Diagnoses:                              F54      Psychological Factors affecting other medical condition                                                                 Psychosocial and Contextual Factors: stayed the same         Progress toward goals: good.              Pain status since onset of pain services: improved           Emotional status since onset of pain services: improved       Medication / chemical use concerns: None        PLAN:               Next Appointment: Dinora Mcghee will schedule a follow-up appointment in 4 weeks.         Assignment: Establish a daily routine of stretching and exercise and Healthy breathing skills  for daily use         Objectives / interventions for next session:          Improve pain management skills: Goals include:  Cognitive therapy: create constructive thought processes and beliefs regarding pain               Maria Elena Abdalla, Ph.D., L.P. ...............5/31/2017 3:40 PM                Medical Psychologist

## 2017-05-31 NOTE — NURSING NOTE
Chief Complaint   Patient presents with     Minor Procedure     Procedure Sjogrens Lip Biopsy      Pt states no pain today.    N Zia EUCEDAN

## 2017-05-31 NOTE — PATIENT INSTRUCTIONS
Today you had a biopsy of your lower lip:  1. The numbness will wear off over the next 60-90 minutes  2. When numb, be careful eating, you could bite your lip   3. The sutures will dissolve in 7-10 days, sometimes longer. If they don't dissolve by then, and they bother you, call and the RN can remove them.  4. The biopsy site (inside your lip) might be uncomfortable, red, sore for a few days-this is normal. Take Tylenol every 3-4 hours as needed  5. Call if redness/swelling persists for more than 5 days  6. The referring doctor will call you with test results    Clinic contact information:  1. To schedule an appointment call [phone], option 1  2. To talk to the Triage RN call [phone], option 3  3. If you need to speak to Maria Elena or get a message to your doctor on a Friday, call the triage RN  4. Maria ElenaRN: [phone]  5. Surgery scheduling:      Ella Macedo: [phone]      Racquel Delgado: [phone]  6. Fax: [phone]  7. [phone]
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
## 2017-06-01 LAB — COPATH REPORT: NORMAL

## 2017-06-02 ENCOUNTER — TELEPHONE (OUTPATIENT)
Dept: TRANSPLANT | Facility: CLINIC | Age: 52
End: 2017-06-02

## 2017-06-02 DIAGNOSIS — E13.9 POST-PANCREATECTOMY DIABETES (H): ICD-10-CM

## 2017-06-02 DIAGNOSIS — E89.1 POST-PANCREATECTOMY DIABETES (H): ICD-10-CM

## 2017-06-02 DIAGNOSIS — Z53.9 ERRONEOUS ENCOUNTER--DISREGARD: Primary | ICD-10-CM

## 2017-06-02 DIAGNOSIS — Z90.410 POST-PANCREATECTOMY DIABETES (H): ICD-10-CM

## 2017-06-14 DIAGNOSIS — Z90.410 ACQUIRED TOTAL ABSENCE OF PANCREAS: ICD-10-CM

## 2017-06-15 DIAGNOSIS — E89.1 POST-PANCREATECTOMY DIABETES (H): Primary | ICD-10-CM

## 2017-06-15 DIAGNOSIS — Z90.410 POST-PANCREATECTOMY DIABETES (H): Primary | ICD-10-CM

## 2017-06-15 DIAGNOSIS — K85.90 ACUTE ON CHRONIC PANCREATITIS (H): ICD-10-CM

## 2017-06-15 DIAGNOSIS — E13.9 POST-PANCREATECTOMY DIABETES (H): Primary | ICD-10-CM

## 2017-06-15 DIAGNOSIS — K86.1 ACUTE ON CHRONIC PANCREATITIS (H): ICD-10-CM

## 2017-06-17 ASSESSMENT — ENCOUNTER SYMPTOMS
EYE REDNESS: 1
DIARRHEA: 1
EXERCISE INTOLERANCE: 1
NAUSEA: 1
HEADACHES: 1
HYPOTENSION: 0
MYALGIAS: 1
SYNCOPE: 0
NECK MASS: 0
HOARSE VOICE: 1
SINUS PAIN: 0
TACHYCARDIA: 1
MUSCLE CRAMPS: 0
EYE PAIN: 1
BLOATING: 1
BACK PAIN: 0
SORE THROAT: 1
MEMORY LOSS: 0
DOUBLE VISION: 0
VOMITING: 0
DIZZINESS: 1
CONSTIPATION: 1
PALPITATIONS: 1
RECTAL BLEEDING: 1
WEAKNESS: 1
STIFFNESS: 1
SWOLLEN GLANDS: 0
BOWEL INCONTINENCE: 0
TREMORS: 0
EYE IRRITATION: 1
ARTHRALGIAS: 1
PARALYSIS: 0
BLOOD IN STOOL: 0
TASTE DISTURBANCE: 0
DISTURBANCES IN COORDINATION: 0
JOINT SWELLING: 0
SMELL DISTURBANCE: 0
EYE WATERING: 0
HYPERTENSION: 0
TROUBLE SWALLOWING: 0
HOT FLASHES: 1
BRUISES/BLEEDS EASILY: 0
CLAUDICATION: 0
NECK PAIN: 1
SINUS CONGESTION: 1
MUSCLE WEAKNESS: 1
TINGLING: 1
SPEECH CHANGE: 0
ABDOMINAL PAIN: 1
RECTAL PAIN: 1
DECREASED LIBIDO: 1
SEIZURES: 0
HEARTBURN: 1
LIGHT-HEADEDNESS: 1
LEG SWELLING: 1
JAUNDICE: 0
LEG PAIN: 0
ORTHOPNEA: 0
LOSS OF CONSCIOUSNESS: 0
SLEEP DISTURBANCES DUE TO BREATHING: 0
NUMBNESS: 1

## 2017-06-17 ASSESSMENT — ANXIETY QUESTIONNAIRES
7. FEELING AFRAID AS IF SOMETHING AWFUL MIGHT HAPPEN: NOT AT ALL
GAD7 TOTAL SCORE: 4
GAD7 TOTAL SCORE: 0
2. NOT BEING ABLE TO STOP OR CONTROL WORRYING: NOT AT ALL
GAD7 TOTAL SCORE: 0
6. BECOMING EASILY ANNOYED OR IRRITABLE: SEVERAL DAYS
1. FEELING NERVOUS, ANXIOUS, OR ON EDGE: SEVERAL DAYS
4. TROUBLE RELAXING: SEVERAL DAYS
7. FEELING AFRAID AS IF SOMETHING AWFUL MIGHT HAPPEN: NOT AT ALL
5. BEING SO RESTLESS THAT IT IS HARD TO SIT STILL: SEVERAL DAYS
3. WORRYING TOO MUCH ABOUT DIFFERENT THINGS: NOT AT ALL

## 2017-06-19 ENCOUNTER — OFFICE VISIT (OUTPATIENT)
Dept: ANESTHESIOLOGY | Facility: CLINIC | Age: 52
End: 2017-06-19

## 2017-06-19 VITALS
OXYGEN SATURATION: 100 % | HEART RATE: 84 BPM | SYSTOLIC BLOOD PRESSURE: 120 MMHG | BODY MASS INDEX: 23.11 KG/M2 | WEIGHT: 152.5 LBS | DIASTOLIC BLOOD PRESSURE: 76 MMHG | HEIGHT: 68 IN

## 2017-06-19 DIAGNOSIS — M75.02 ADHESIVE CAPSULITIS OF LEFT SHOULDER: ICD-10-CM

## 2017-06-19 DIAGNOSIS — M79.2 NEUROPATHIC PAIN: ICD-10-CM

## 2017-06-19 DIAGNOSIS — R10.9 CHRONIC ABDOMINAL PAIN: ICD-10-CM

## 2017-06-19 DIAGNOSIS — G89.29 CHRONIC ABDOMINAL PAIN: ICD-10-CM

## 2017-06-19 DIAGNOSIS — Z90.410 HISTORY OF PANCREATECTOMY: ICD-10-CM

## 2017-06-19 DIAGNOSIS — G89.29 COPING WITH CHRONIC PAIN: Primary | ICD-10-CM

## 2017-06-19 DIAGNOSIS — F41.9 ANXIETY: ICD-10-CM

## 2017-06-19 RX ORDER — PREGABALIN 25 MG/1
75 CAPSULE ORAL 2 TIMES DAILY
Qty: 180 CAPSULE | Refills: 0 | Status: SHIPPED | OUTPATIENT
Start: 2017-06-19 | End: 2017-06-19

## 2017-06-19 RX ORDER — PREGABALIN 25 MG/1
75 CAPSULE ORAL 2 TIMES DAILY
Qty: 180 CAPSULE | Refills: 0 | Status: SHIPPED | OUTPATIENT
Start: 2017-06-19 | End: 2017-08-04

## 2017-06-19 ASSESSMENT — PAIN SCALES - GENERAL: PAINLEVEL: MILD PAIN (2)

## 2017-06-19 NOTE — NURSING NOTE
LPN reviewed AVS with Pt includin. [facility] [phone]  Prabhjot Snowden   [address] [place], [place]    2.Trial a taper of Lyrica 25mg a week.   Week 1: 75 mg in AM, 50 mg in PM  Week 2: 50 mg in AM, 50 mg in PM  Week 3: 50 mg in AM, 25 mg in PM  Week 4: 25 mg in AM, 25 mg in PM  Week 5: 25 mg in AM, 0 in PM  Week 6: Off.     Call the clinic if your pain increases.     3. Continue Physical Therapy.     4.Use Celebrex As needed.     5. We performed a shoulder left shoulder injection, Call us with any concerns for infection: redness and drainage at the injection site, fever, chills, sweats.      Follow up: Schedule an appointment with [facility] MD in 6-8 weeks for Follow up.     Pt verbalized an understanding of information, and was asked to contact clinic with questions.    Rossi Gomez LPN
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
## 2017-06-19 NOTE — PATIENT INSTRUCTIONS
1. [facility] [phone]  Prabhjot Snowden   [address], [place]    2.Trial a taper of Lyrica 25mg a week.   Week 1: 75 mg in AM, 50 mg in PM  Week 2: 50 mg in AM, 50 mg in PM  Week 3: 50 mg in AM, 25 mg in PM  Week 4: 25 mg in AM, 25 mg in PM  Week 5: 25 mg in AM, 0 in PM  Week 6: Off.     Call the clinic if your pain increases.     3. Continue Physical Therapy.     4.Use Celebrex As needed.     Follow up: Schedule an appointment with [facility] MD in 6-8 weeks for Follow up.       To speak with a nurse, schedule/reschedule/cancel a clinic appointment, or request a medication refill call: [phone]     You can also reach us by [place]: [email or web address]    For refills, please call on Monday, 1 week before your medication runs out. The doctors are not always in clinic, so this gives us time to get your prescriptions ready.  Please let us know the name of the medication you are requesting a refill of.

## 2017-06-19 NOTE — PROGRESS NOTES
"Date of visit: 6/19/2017    Chief complaint:   Chief Complaint   Patient presents with     Pain Management     Follow up        Interval history:  Dinora Mcghee is a 52 year old female last seen by me on 4/10/17.      Recommendations/plan at the last visit included:  8. Physical Therapy: Continue therapy for shoulder.   9. Clinical Health Psychologist to address issues of relaxation, behavioral change, coping style, and other factors important to improvement. She is continuing her therapy, using EMDR.  10. Diagnostic Studies: None.  11. Medication Management: Refill for Lorazepam 0.5mg q 6 PRN primarily for sleep anxiety (20 tabs provided)- instructed to talk with PCP about SNRI use and long term anxiety management. Instructed to not use opioids while taking lorazepam. Will continue Lyrica, Celebrex, tylenol PRN. Also, instructed to keep using heat and ice.  We discussed that her Lorazepam usage is a temporary therapy until her shoulder improves and her liver work-up is complete - which are the main contributors to her anxiety.  Overall long term management of anxiety would be best achieved by therapy and her SNRI.    12. Procedures: Left Shoulder SA bursa injection today with local only - will avoid steroids in this transplant patient.  13. Recommendations to PCP: Anxiety and insomnia follow up.  14. Follow up: 2-3 months    Since her last visit, Dinora Mcghee reports:  Still dealing with left shoulder pain 2/2 adhesive capsulitis + minor \"liver pain\".   Roll on lidocaine is not effective.  She is seeing PT for dry needling and myofascial release which is mildly effective.  She would like another bursa injection today.   Her coping skills have improved greatly with the aide of Dr. Abdalla and her sleep is moderately improved as well.  Overall her anxiety is decreased.   She would like to try to wean off of Lyrica as she feels it may harm her liver or be causing nausea.     Pain scores:  Pain intensity on " average is 4 on a scale of 0-10.     Current pain treatments:   Creon Supplements  Flexeril 5-10mg TID prn  Elavil 20mg QHS  Lyrica 75mg BID  Celebrex prn - not taken in several weeks.    Side Effects: no side effect    Medications:  Current Outpatient Prescriptions   Medication Sig Dispense Refill     blood glucose monitoring (PAT CONTOUR NEXT) test strip Use to test blood sugar 6-8 times daily or as directed. 200 strip 3     amylase-lipase-protease (CREON 24) 25489 UNITS CPEP per EC capsule Take 3-4 capsules by mouth with meals and 1-2 with snacks. Maximum 15 capsules per day. 450 capsule 6     insulin glargine (LANTUS) 100 UNIT/ML injection Inject 5 units subcutaneously every morning. 3 mL 3     insulin pen needle (BD KEN U/F) 32G X 4 MM Use 6-8 times per day 100 each 11     celecoxib (CELEBREX) 100 MG capsule Take 1 capsule (100 mg) by mouth 2 times daily 60 capsule 1     Nutritional Supplements (BOOST HIGH PROTEIN) LIQD After above baseline labs are drawn, give: 6 mL/kg to maximum of 360 mL; the beverage is to be consumed within 5 minutes.  0     cetirizine (ZYRTEC) 10 MG tablet Take 10 mg by mouth daily       LORazepam (ATIVAN) 0.5 MG tablet Take 1 tablet (0.5 mg) by mouth every 6 hours as needed for anxiety 20 tablet 0     Lubiprostone 8 MCG CAPS capsule Take 1 capsule (8 mcg) by mouth 2 times daily.  Take 1 capsule by mouth po BID x 5 days.  If no effect, increase to 2 capsules po BID x 5 days.  If no effect, increase to 3 capsules po BID and continue until further instruction. 90 capsule 3     insulin aspart (NOVOLOG PENFILL) 100 UNIT/ML injection Administer subcutaneously 0.5 unit per 45 grams of carbohydrates. 3 mL 3     Nutritional Supplements (BOOST HIGH PROTEIN) LIQD After above baseline labs are drawn, give: 6 mL/kg to maximum of 360 mL; the beverage is to be consumed within 5 minutes.  0     cyclobenzaprine (FLEXERIL) 5 MG tablet Take 5-10 mg by mouth three times per day as needed for muscle  spasms. 42 tablet 3     oxyCODONE (ROXICODONE) 5 MG IR tablet Take 1-2 tablets (5-10 mg) by mouth every 4 hours as needed for moderate to severe pain 150 tablet 0     insulin aspart (NOVOLOG PEN) 100 UNIT/ML injection aspart medium correction 1 unit per 50 > 130 every 4 hours    For Pre-Meal  - 179 give 1 unit.   For Pre-Meal  - 230 give 2 units.   For Pre-Meal  - 281 give 3 units.   For Pre-Meal  - 332 give 4 units.  3     amitriptyline (ELAVIL) 10 MG tablet Take 2 tablets (20 mg) by mouth At Bedtime 60 tablet 3     senna-docusate (SENOKOT-S;PERICOLACE) 8.6-50 MG per tablet Take 1 tablet by mouth 2 times daily as needed for constipation 100 tablet      prochlorperazine (COMPAZINE) 5 MG tablet Take two 5 mg tablets by mouth every six hours as needed for nausea. 90 tablet 3     polyethylene glycol (MIRALAX/GLYCOLAX) Packet Take 17 g by mouth daily as needed for constipation 7 packet 11     diphenhydrAMINE (BENADRYL ALLERGY) 25 MG capsule Take 1 capsule (25 mg) by mouth every 6 hours as needed for itching or allergies 56 capsule 0     levothyroxine (SYNTHROID/LEVOTHROID) 125 MCG tablet Take 1 tablet (125 mcg), by mouth daily before breakfast. 1 tablet 0     docusate sodium (COLACE) 100 MG capsule Take 1 capsule (100 mg) by mouth 2 times daily as needed for constipation, 60 capsule 0     multivitamin CF formula (CHOICEFUL) capsule Take 1 tablet by mouth daily  11     Blood Glucose Monitoring Suppl (CONTOUR NEXT EZ MONITOR) W/DEVICE KIT 1 Device 6 times daily       pregabalin (LYRICA) 75 MG capsule Take 1 capsule (75 mg) by mouth 2 times daily 150 capsule 3     glucagon (GLUCAGON EMERGENCY) 1 MG kit Inject 1 mg into the muscle once for 1 dose 1 mg 1     LANsoprazole (PREVACID) 30 MG CR capsule Take 1 capsule (30 mg) by mouth daily Take 30-60 minutes before a meal. 30 capsule 11     aspirin 81 MG EC tablet Take 1 tablet (81 mg) by mouth daily 90 tablet 3     Injection Device for insulin (NOVOPEN  "ECHO) MARCO 1 each daily as needed 1 each 0     glucose 40 % GEL gel Take 15-30 g by mouth every 15 minutes as needed for low blood sugar 3 Tube 2     Sharps Container (BD SHARPS ) MISC 1 Container as needed 1 each 1     blood glucose monitoring (PAT MICROLET) lancets Use to test blood sugar 6-8 times daily or as directed. 1 Box prn       Medical History: any changes in medical history since they were last seen? foreign body removed in april by Dr. Phoenix    Review of Systems:  The 14 system ROS was reviewed from the intake questionnaire, and is positive for: left shoulder pain, abdominal pain  Any bowel or bladder problems: none  Mood: stable    Physical Exam:  Blood pressure 120/76, pulse 84, height 1.727 m (5' 8\"), weight 69.2 kg (152 lb 8 oz), SpO2 100 %, not currently breastfeeding.  General: NAD  Gait: Normal  MSK exam: Left Shoulder - no visible lesions on inspection, +TTP at anterior GH joint.   ROM limited to about 80 degrees in abduction in both passive and active ROM due to pain.   SILT, 5/5 strength.  No signs of rotator cuff impingement.       Assessment:   1.) Chronic Abdominal Pain  2.) Chronic Biliary Pancreatitis s/p TP-IATT 12/2016  3.) Left shoulder pain 2/2 adhesive capsulitis   4.) Anxiety and insomnia    Dinora Mcghee is a 52 year old female who is seen at the pain clinic for the above complaints.    Plan:  1. Interventions: Left shoulder AC bursa injection today with local only (no steroids secondary to transplant history)  2. Medication Management: wean lyrica 25mg a week until off - I am willing to trial a wean but warned her that her abdominal pain may worsen once off lyrica and that if it does we should restart.   It would be unusual for lyrica to cause continue nausea after months of stable therapy.    3. Physical Therapy:  Continue PT and HEP for her shoulder - I will refer her to acupuncture as well.   She is not interested in surgical options at this time.  4. Need for " Clinical Health Psychologist.continue to follow with Maria Elena Abdalla  5. Diagnostic Studies:  None  6. Follow up: 2 months and then prn.    Total time spent was 30 minutes, and more than 50% of face to face time was spent in counseling and/or coordination of care regarding the above plan.    Sebastian López DO    [facility]  Pain Management  Department of Anesthesiology      SUBACROMIAL BURSA INJECTION     PROCEDURE:  1) LEFT subacromial bursa injections     REASON FOR PROCEDURE:Chronic left shoulder pain secondary to adhesive capsulitis     PHYSICIAN: Sebastian López DO     MEDICATIONS INJECTED: A 3mL solution containing 3mL 0.5% Bupivacaine.  LOCAL ANESTHETIC INJECTED: None    Pre-procedure pain score 5/10  Post-procedure pain score 3/10 with slightly improved ROM.     TECHNIQUE: Time-out was taken to identify the correct patient, procedure and side prior to starting the procedure.  The target site was determined by direct palpation and the left shoulder was prepped with alcohol.  A 25-gauge, 1.5-inch straight needle was then used to advanced to the subacromial space and the above medication was injected after negative aspiration for heme.  She noticed an immediate increase in her range of motion.     The procedure was completed without complications and was tolerated well. The patient was monitored after the procedure. The patient (or responsible party) was given post-procedure and discharge instructions to follow at home. The patient was discharged in stable condition.   A follow-up phone call will be made within 1 week.     Sebastian López DO    [facility]  Pain Management  Department of Anesthesiology      Answers for HPI/ROS submitted by the patient on 6/17/2017   DARCY 7 TOTAL SCORE: 0  PHQ-2 Score: 0  General Symptoms: No  Skin Symptoms: No  HENT Symptoms: Yes  EYE SYMPTOMS: Yes  HEART SYMPTOMS: Yes  LUNG SYMPTOMS: No  INTESTINAL SYMPTOMS:  Yes  URINARY SYMPTOMS: No  GYNECOLOGIC SYMPTOMS: Yes  BREAST SYMPTOMS: No  SKELETAL SYMPTOMS: Yes  BLOOD SYMPTOMS: Yes  NERVOUS SYSTEM SYMPTOMS: Yes  MENTAL HEALTH SYMPTOMS: No  Ear pain: No  Ear discharge: No  Hearing loss: No  Tinnitus: No  Nosebleeds: No  Congestion: Yes  Sinus pain: No  Trouble swallowing: No   Voice hoarseness: Yes  Mouth sores: No  Sore throat: Yes  Tooth pain: No  Gum tenderness: No  Bleeding gums: No  Change in taste: No  Change in sense of smell: No  Dry mouth: Yes  Neck lump: No  Eye pain: Yes  Vision loss: No  Dry eyes: Yes  Watery eyes: No  Eye bulging: No  Double vision: No  Flashing of lights: No  Spots: No  Floaters: No  Redness: Yes  Crossed eyes: No  Tunnel Vision: No  Yellowing of eyes: No  Eye irritation: Yes  Chest pain or pressure: No  Fast or irregular heartbeat: Yes  Pain in legs with walking: No  Swelling in feet or ankles: Yes  Trouble breathing while lying down: No  Fingers or Toes appear blue: No  High blood pressure: No  Low blood pressure: No  Fainting: No  Murmurs: No  Chest pain on exertion: No  Chest pain at rest: No  Cramping pain in leg during exercise: No  Pacemaker: No  Varicose veins: Yes  Edema or swelling: Yes  Fast heart beat: Yes  Wake up at night with shortness of breath: No  Heart flutters: Yes  Light-headedness: Yes  Exercise intolerance: Yes  Heart burn or indigestion: Yes  Nausea: Yes  Vomiting: No  Abdominal pain: Yes  Bloating: Yes  Constipation: Yes  Diarrhea: Yes  Blood in stool: No  Black stools: No  Rectal or Anal pain: Yes  Fecal incontinence: No  Rectal bleeding: Yes  Yellowing of skin or eyes: No  Vomit with blood: No  Change in stools: Yes  Hemorrhoids: No  Back pain: No  Muscle aches: Yes  Neck pain: Yes  Swollen joints: No  Joint pain: Yes  Bone pain: No  Muscle cramps: No  Muscle weakness: Yes  Joint stiffness: Yes  Bone fracture: No  Anemia: Yes  Swollen glands: No  Easy bleeding or bruising: No  Trouble with coordination: No  Dizziness or  trouble with balance: Yes  Fainting or black-out spells: No  Memory loss: No  Headache: Yes  Seizures: No  Speech problems: No  Tingling: Yes  Tremor: No  Weakness: Yes  Difficulty walking: No  Paralysis: No  Numbness: Yes  Bleeding or spotting between periods: No  Heavy or painful periods: No  Irregular periods: No  Vaginal discharge: No  Hot flashes: Yes  Vaginal dryness: Yes  Genital ulcers: No  Reduced libido: Yes  Painful intercourse: Yes  Difficulty with sexual arousal: Yes  Post-menopausal bleeding: No

## 2017-06-19 NOTE — LETTER
"6/19/2017       RE: Dinora Mcghee  [address] [place] [facility] [address]     Dear Colleague,    Thank you for referring your patient, Dinora Mcghee, to the [facility] at [facility]. Please see a copy of my visit note below.    Date of visit: 6/19/2017    Chief complaint:   Chief Complaint   Patient presents with     Pain Management     Follow up        Interval history:  Dinora Mcghee is a 52 year old female last seen by me on 4/10/17.      Recommendations/plan at the last visit included:  8. Physical Therapy: Continue therapy for shoulder.   9. Clinical Health Psychologist to address issues of relaxation, behavioral change, coping style, and other factors important to improvement. She is continuing her therapy, using EMDR.  10. Diagnostic Studies: None.  11. Medication Management: Refill for Lorazepam 0.5mg q 6 PRN primarily for sleep anxiety (20 tabs provided)- instructed to talk with PCP about SNRI use and long term anxiety management. Instructed to not use opioids while taking lorazepam. Will continue Lyrica, Celebrex, tylenol PRN. Also, instructed to keep using heat and ice.  We discussed that her Lorazepam usage is a temporary therapy until her shoulder improves and her liver work-up is complete - which are the main contributors to her anxiety.  Overall long term management of anxiety would be best achieved by therapy and her SNRI.    12. Procedures: Left Shoulder SA bursa injection today with local only - will avoid steroids in this transplant patient.  13. Recommendations to PCP: Anxiety and insomnia follow up.  14. Follow up: 2-3 months    Since her last visit, Dinora Mcghee reports:  Still dealing with left shoulder pain 2/2 adhesive capsulitis + minor \"liver pain\".   Roll on lidocaine is not effective.  She is seeing PT for dry needling and myofascial release which is mildly effective.  She would like " another bursa injection today.   Her coping skills have improved greatly with the aide of Dr. Abdalla and her sleep is moderately improved as well.  Overall her anxiety is decreased.   She would like to try to wean off of Lyrica as she feels it may harm her liver or be causing nausea.     Pain scores:  Pain intensity on average is 4 on a scale of 0-10.     Current pain treatments:   Creon Supplements  Flexeril 5-10mg TID prn  Elavil 20mg QHS  Lyrica 75mg BID  Celebrex prn - not taken in several weeks.    Side Effects: no side effect    Medications:  Current Outpatient Prescriptions   Medication Sig Dispense Refill     blood glucose monitoring ([employer] CONTOUR NEXT) test strip Use to test blood sugar 6-8 times daily or as directed. 200 strip 3     amylase-lipase-protease (CREON 24) 54450 UNITS CPEP per EC capsule Take 3-4 capsules by mouth with meals and 1-2 with snacks. Maximum 15 capsules per day. 450 capsule 6     insulin glargine (LANTUS) 100 UNIT/ML injection Inject 5 units subcutaneously every morning. 3 mL 3     insulin pen needle (BD KEN U/F) 32G X 4 MM Use 6-8 times per day 100 each 11     celecoxib (CELEBREX) 100 MG capsule Take 1 capsule (100 mg) by mouth 2 times daily 60 capsule 1     Nutritional Supplements (BOOST HIGH PROTEIN) LIQD After above baseline labs are drawn, give: 6 mL/kg to maximum of 360 mL; the beverage is to be consumed within 5 minutes.  0     cetirizine (ZYRTEC) 10 MG tablet Take 10 mg by mouth daily       LORazepam (ATIVAN) 0.5 MG tablet Take 1 tablet (0.5 mg) by mouth every 6 hours as needed for anxiety 20 tablet 0     Lubiprostone 8 MCG CAPS capsule Take 1 capsule (8 mcg) by mouth 2 times daily.  Take 1 capsule by mouth po BID x 5 days.  If no effect, increase to 2 capsules po BID x 5 days.  If no effect, increase to 3 capsules po BID and continue until further instruction. 90 capsule 3     insulin aspart (NOVOLOG PENFILL) 100 UNIT/ML injection Administer subcutaneously 0.5 unit per 45  grams of carbohydrates. 3 mL 3     Nutritional Supplements (BOOST HIGH PROTEIN) LIQD After above baseline labs are drawn, give: 6 mL/kg to maximum of 360 mL; the beverage is to be consumed within 5 minutes.  0     cyclobenzaprine (FLEXERIL) 5 MG tablet Take 5-10 mg by mouth three times per day as needed for muscle spasms. 42 tablet 3     oxyCODONE (ROXICODONE) 5 MG IR tablet Take 1-2 tablets (5-10 mg) by mouth every 4 hours as needed for moderate to severe pain 150 tablet 0     insulin aspart (NOVOLOG PEN) 100 UNIT/ML injection aspart medium correction 1 unit per 50 > 130 every 4 hours    For Pre-Meal  - 179 give 1 unit.   For Pre-Meal  - 230 give 2 units.   For Pre-Meal  - 281 give 3 units.   For Pre-Meal  - 332 give 4 units.  3     amitriptyline (ELAVIL) 10 MG tablet Take 2 tablets (20 mg) by mouth At Bedtime 60 tablet 3     senna-docusate (SENOKOT-S;PERICOLACE) 8.6-50 MG per tablet Take 1 tablet by mouth 2 times daily as needed for constipation 100 tablet      prochlorperazine (COMPAZINE) 5 MG tablet Take two 5 mg tablets by mouth every six hours as needed for nausea. 90 tablet 3     polyethylene glycol (MIRALAX/GLYCOLAX) Packet Take 17 g by mouth daily as needed for constipation 7 packet 11     diphenhydrAMINE (BENADRYL ALLERGY) 25 MG capsule Take 1 capsule (25 mg) by mouth every 6 hours as needed for itching or allergies 56 capsule 0     levothyroxine (SYNTHROID/LEVOTHROID) 125 MCG tablet Take 1 tablet (125 mcg), by mouth daily before breakfast. 1 tablet 0     docusate sodium (COLACE) 100 MG capsule Take 1 capsule (100 mg) by mouth 2 times daily as needed for constipation, 60 capsule 0     multivitamin CF formula (CHOICEFUL) capsule Take 1 tablet by mouth daily  11     Blood Glucose Monitoring Suppl (CONTOUR NEXT EZ MONITOR) W/DEVICE KIT 1 Device 6 times daily       pregabalin (LYRICA) 75 MG capsule Take 1 capsule (75 mg) by mouth 2 times daily 150 capsule 3     glucagon (GLUCAGON  "EMERGENCY) 1 MG kit Inject 1 mg into the muscle once for 1 dose 1 mg 1     LANsoprazole (PREVACID) 30 MG CR capsule Take 1 capsule (30 mg) by mouth daily Take 30-60 minutes before a meal. 30 capsule 11     aspirin 81 MG EC tablet Take 1 tablet (81 mg) by mouth daily 90 tablet 3     Injection Device for insulin (NOVOPEN ECHO) MARCO 1 each daily as needed 1 each 0     glucose 40 % GEL gel Take 15-30 g by mouth every 15 minutes as needed for low blood sugar 3 Tube 2     Sharps Container (BD SHARPS ) MISC 1 Container as needed 1 each 1     blood glucose monitoring (PAT MICROLET) lancets Use to test blood sugar 6-8 times daily or as directed. 1 Box prn       Medical History: any changes in medical history since they were last seen? foreign body removed in april by Dr. Phoenix    Review of Systems:  The 14 system ROS was reviewed from the intake questionnaire, and is positive for: left shoulder pain, abdominal pain  Any bowel or bladder problems: none  Mood: stable    Physical Exam:  Blood pressure 120/76, pulse 84, height 1.727 m (5' 8\"), weight 69.2 kg (152 lb 8 oz), SpO2 100 %, not currently breastfeeding.  General: NAD  Gait: Normal  MSK exam: Left Shoulder - no visible lesions on inspection, +TTP at anterior GH joint.   ROM limited to about 80 degrees in abduction in both passive and active ROM due to pain.   SILT, 5/5 strength.  No signs of rotator cuff impingement.       Assessment:   1.) Chronic Abdominal Pain  2.) Chronic Biliary Pancreatitis s/p TP-IATT 12/2016  3.) Left shoulder pain 2/2 adhesive capsulitis   4.) Anxiety and insomnia    Dinora Mcghee is a 52 year old female who is seen at the pain clinic for the above complaints.    Plan:  1. Interventions: Left shoulder AC bursa injection today with local only (no steroids secondary to transplant history)  2. Medication Management: wean lyrica 25mg a week until off - I am willing to trial a wean but warned her that her abdominal pain may worsen " once off lyrica and that if it does we should restart.   It would be unusual for lyrica to cause continue nausea after months of stable therapy.    3. Physical Therapy:  Continue PT and HEP for her shoulder - I will refer her to acupuncture as well.   She is not interested in surgical options at this time.  4. Need for Clinical Health Psychologist.continue to follow with Maria Elena Abdalla  5. Diagnostic Studies:  None  6. Follow up: 2 months and then prn.    Total time spent was 30 minutes, and more than 50% of face to face time was spent in counseling and/or coordination of care regarding the above plan.    Sebastian López DO    [facility]  Pain Management  Department of Anesthesiology      SUBACROMIAL BURSA INJECTION     PROCEDURE:  1) LEFT subacromial bursa injections     REASON FOR PROCEDURE:Chronic left shoulder pain secondary to adhesive capsulitis     PHYSICIAN: Sebastian López DO     MEDICATIONS INJECTED: A 3mL solution containing 3mL 0.5% Bupivacaine.  LOCAL ANESTHETIC INJECTED: None    Pre-procedure pain score 5/10  Post-procedure pain score 3/10 with slightly improved ROM.     TECHNIQUE: Time-out was taken to identify the correct patient, procedure and side prior to starting the procedure.  The target site was determined by direct palpation and the left shoulder was prepped with alcohol.  A 25-gauge, 1.5-inch straight needle was then used to advanced to the subacromial space and the above medication was injected after negative aspiration for heme.  She noticed an immediate increase in her range of motion.     The procedure was completed without complications and was tolerated well. The patient was monitored after the procedure. The patient (or responsible party) was given post-procedure and discharge instructions to follow at home. The patient was discharged in stable condition.   A follow-up phone call will be made within 1 week.     Again, thank you for allowing me to  participate in the care of your patient.      Sincerely,    Sebastian López, DO

## 2017-06-20 ENCOUNTER — ALLIED HEALTH/NURSE VISIT (OUTPATIENT)
Dept: TRANSPLANT | Facility: CLINIC | Age: 52
End: 2017-06-20
Attending: SURGERY
Payer: COMMERCIAL

## 2017-06-20 ENCOUNTER — OFFICE VISIT (OUTPATIENT)
Dept: TRANSPLANT | Facility: CLINIC | Age: 52
End: 2017-06-20
Attending: SURGERY
Payer: COMMERCIAL

## 2017-06-20 VITALS
BODY MASS INDEX: 22.49 KG/M2 | WEIGHT: 148.37 LBS | SYSTOLIC BLOOD PRESSURE: 102 MMHG | DIASTOLIC BLOOD PRESSURE: 71 MMHG | HEIGHT: 68 IN | TEMPERATURE: 97.8 F | OXYGEN SATURATION: 97 % | RESPIRATION RATE: 16 BRPM | HEART RATE: 95 BPM

## 2017-06-20 VITALS — BODY MASS INDEX: 22.48 KG/M2 | WEIGHT: 148.3 LBS | HEIGHT: 68 IN

## 2017-06-20 DIAGNOSIS — Z90.410 POST-PANCREATECTOMY DIABETES (H): ICD-10-CM

## 2017-06-20 DIAGNOSIS — E13.9 POST-PANCREATECTOMY DIABETES (H): ICD-10-CM

## 2017-06-20 DIAGNOSIS — E89.1 POST-PANCREATECTOMY DIABETES (H): ICD-10-CM

## 2017-06-20 DIAGNOSIS — K86.89 PANCREATIC INSUFFICIENCY: ICD-10-CM

## 2017-06-20 DIAGNOSIS — E13.9 POST-PANCREATECTOMY DIABETES (H): Primary | ICD-10-CM

## 2017-06-20 DIAGNOSIS — Z90.81 ACQUIRED ASPLENIA: ICD-10-CM

## 2017-06-20 DIAGNOSIS — S30.851A FOREIGN BODY OF ABDOMINAL WALL, INITIAL ENCOUNTER: ICD-10-CM

## 2017-06-20 DIAGNOSIS — K86.89 PANCREATIC INSUFFICIENCY: Primary | ICD-10-CM

## 2017-06-20 DIAGNOSIS — E89.1 POST-PANCREATECTOMY DIABETES (H): Primary | ICD-10-CM

## 2017-06-20 DIAGNOSIS — Z90.410 POST-PANCREATECTOMY DIABETES (H): Primary | ICD-10-CM

## 2017-06-20 DIAGNOSIS — M35.00 SICCA SYNDROME (H): ICD-10-CM

## 2017-06-20 DIAGNOSIS — K63.89 BACTERIAL OVERGROWTH SYNDROME: ICD-10-CM

## 2017-06-20 DIAGNOSIS — D89.84 IGG4-RELATED SCLEROSING DISEASE (H): ICD-10-CM

## 2017-06-20 LAB
ALBUMIN SERPL-MCNC: 3.7 G/DL (ref 3.4–5)
ALP SERPL-CCNC: 475 U/L (ref 40–150)
ALT SERPL W P-5'-P-CCNC: 216 U/L (ref 0–50)
ANION GAP SERPL CALCULATED.3IONS-SCNC: 7 MMOL/L (ref 3–14)
AST SERPL W P-5'-P-CCNC: 127 U/L (ref 0–45)
BASOPHILS # BLD AUTO: 0.1 10E9/L (ref 0–0.2)
BASOPHILS NFR BLD AUTO: 1.4 %
BILIRUB SERPL-MCNC: 1 MG/DL (ref 0.2–1.3)
BUN SERPL-MCNC: 11 MG/DL (ref 7–30)
C PEPTIDE SERPL-MCNC: 0.6 NG/ML (ref 0.9–6.9)
C PEPTIDE SERPL-MCNC: 3.2 NG/ML (ref 0.9–6.9)
C PEPTIDE SERPL-MCNC: 3.7 NG/ML (ref 0.9–6.9)
CALCIUM SERPL-MCNC: 9.2 MG/DL (ref 8.5–10.1)
CHLORIDE SERPL-SCNC: 105 MMOL/L (ref 94–109)
CO2 SERPL-SCNC: 27 MMOL/L (ref 20–32)
CREAT SERPL-MCNC: 0.59 MG/DL (ref 0.52–1.04)
DIFFERENTIAL METHOD BLD: ABNORMAL
EOSINOPHIL # BLD AUTO: 0.3 10E9/L (ref 0–0.7)
EOSINOPHIL NFR BLD AUTO: 5.9 %
ERYTHROCYTE [DISTWIDTH] IN BLOOD BY AUTOMATED COUNT: 20.4 % (ref 10–15)
FERRITIN SERPL-MCNC: 119 NG/ML (ref 8–252)
GFR SERPL CREATININE-BSD FRML MDRD: ABNORMAL ML/MIN/1.7M2
GLUCOSE SERPL-MCNC: 103 MG/DL (ref 70–99)
GLUCOSE SERPL-MCNC: 178 MG/DL (ref 70–99)
GLUCOSE SERPL-MCNC: 80 MG/DL (ref 70–99)
HBA1C MFR BLD: 5.2 % (ref 4.3–6)
HBV CORE AB SERPL QL IA: NONREACTIVE
HCT VFR BLD AUTO: 40.1 % (ref 35–47)
HGB BLD-MCNC: 12.7 G/DL (ref 11.7–15.7)
IMM GRANULOCYTES # BLD: 0 10E9/L (ref 0–0.4)
IMM GRANULOCYTES NFR BLD: 0.2 %
IRON SATN MFR SERPL: 35 % (ref 15–46)
IRON SERPL-MCNC: 86 UG/DL (ref 35–180)
LYMPHOCYTES # BLD AUTO: 1.8 10E9/L (ref 0.8–5.3)
LYMPHOCYTES NFR BLD AUTO: 41.4 %
MCH RBC QN AUTO: 28.3 PG (ref 26.5–33)
MCHC RBC AUTO-ENTMCNC: 31.7 G/DL (ref 31.5–36.5)
MCV RBC AUTO: 89 FL (ref 78–100)
MONOCYTES # BLD AUTO: 0.6 10E9/L (ref 0–1.3)
MONOCYTES NFR BLD AUTO: 13 %
NEUTROPHILS # BLD AUTO: 1.6 10E9/L (ref 1.6–8.3)
NEUTROPHILS NFR BLD AUTO: 38.1 %
NRBC # BLD AUTO: 0 10*3/UL
NRBC BLD AUTO-RTO: 0 /100
PLATELET # BLD AUTO: 447 10E9/L (ref 150–450)
POTASSIUM SERPL-SCNC: 4.1 MMOL/L (ref 3.4–5.3)
PREALB SERPL IA-MCNC: 18 MG/DL (ref 15–45)
PROT SERPL-MCNC: 7.4 G/DL (ref 6.8–8.8)
RBC # BLD AUTO: 4.49 10E12/L (ref 3.8–5.2)
SODIUM SERPL-SCNC: 139 MMOL/L (ref 133–144)
TIBC SERPL-MCNC: 246 UG/DL (ref 240–430)
VIT B12 SERPL-MCNC: 1454 PG/ML (ref 193–986)
WBC # BLD AUTO: 4.2 10E9/L (ref 4–11)

## 2017-06-20 PROCEDURE — 25000128 H RX IP 250 OP 636: Performed by: SURGERY

## 2017-06-20 PROCEDURE — 83036 HEMOGLOBIN GLYCOSYLATED A1C: CPT | Performed by: PEDIATRICS

## 2017-06-20 PROCEDURE — 85014 HEMATOCRIT: CPT | Performed by: PEDIATRICS

## 2017-06-20 PROCEDURE — 84630 ASSAY OF ZINC: CPT | Performed by: PEDIATRICS

## 2017-06-20 PROCEDURE — 82947 ASSAY GLUCOSE BLOOD QUANT: CPT | Performed by: PEDIATRICS

## 2017-06-20 PROCEDURE — 84446 ASSAY OF VITAMIN E: CPT | Performed by: PEDIATRICS

## 2017-06-20 PROCEDURE — 84590 ASSAY OF VITAMIN A: CPT | Performed by: PEDIATRICS

## 2017-06-20 PROCEDURE — 82728 ASSAY OF FERRITIN: CPT | Performed by: PEDIATRICS

## 2017-06-20 PROCEDURE — 82747 ASSAY OF FOLIC ACID RBC: CPT | Performed by: PEDIATRICS

## 2017-06-20 PROCEDURE — 83550 IRON BINDING TEST: CPT | Performed by: PEDIATRICS

## 2017-06-20 PROCEDURE — 85025 COMPLETE CBC W/AUTO DIFF WBC: CPT | Performed by: PEDIATRICS

## 2017-06-20 PROCEDURE — 86704 HEP B CORE ANTIBODY TOTAL: CPT | Performed by: PEDIATRICS

## 2017-06-20 PROCEDURE — 82306 VITAMIN D 25 HYDROXY: CPT | Performed by: PEDIATRICS

## 2017-06-20 PROCEDURE — 84681 ASSAY OF C-PEPTIDE: CPT | Performed by: PEDIATRICS

## 2017-06-20 PROCEDURE — 82607 VITAMIN B-12: CPT | Performed by: PEDIATRICS

## 2017-06-20 PROCEDURE — 86480 TB TEST CELL IMMUN MEASURE: CPT | Performed by: INTERNAL MEDICINE

## 2017-06-20 PROCEDURE — 83540 ASSAY OF IRON: CPT | Performed by: PEDIATRICS

## 2017-06-20 PROCEDURE — 36415 COLL VENOUS BLD VENIPUNCTURE: CPT | Performed by: PEDIATRICS

## 2017-06-20 PROCEDURE — 84134 ASSAY OF PREALBUMIN: CPT | Performed by: PEDIATRICS

## 2017-06-20 PROCEDURE — 80053 COMPREHEN METABOLIC PANEL: CPT | Performed by: PEDIATRICS

## 2017-06-20 RX ORDER — METRONIDAZOLE 500 MG/1
250 TABLET ORAL 3 TIMES DAILY
Qty: 15 TABLET | Refills: 0 | Status: SHIPPED | OUTPATIENT
Start: 2017-06-20 | End: 2017-10-20

## 2017-06-20 RX ORDER — HEPARIN SODIUM (PORCINE) LOCK FLUSH IV SOLN 100 UNIT/ML 100 UNIT/ML
5 SOLUTION INTRAVENOUS ONCE
Status: COMPLETED | OUTPATIENT
Start: 2017-06-20 | End: 2017-06-20

## 2017-06-20 RX ADMIN — SODIUM CHLORIDE, PRESERVATIVE FREE 5 ML: 5 INJECTION INTRAVENOUS at 08:44

## 2017-06-20 NOTE — NURSING NOTE
Chief Complaint   Patient presents with     Port Draw     Labs collected via port by RN.      Port accessed x2, return slowed mid draw, labs collected, flushed and locked with normal saline and heparin.     Manju sEparza RN

## 2017-06-20 NOTE — NURSING NOTE
Dinora Mcghee arrived fasting for labs, vitals obtained, instructions provided for mixed meal test.  Patient stated understanding of the plan.  Fasting labs drawn, patient consumed 360 ml of BOOST per providers instruction.  Patient instructed to return to the lab at 1hr (10:35 AM) and 2hr (11:35 AM) post prandial and remain fasting until completion of the test with exception of enzymes needed or pain medication.

## 2017-06-20 NOTE — MR AVS SNAPSHOT
After Visit Summary   6/20/2017    Dinora Mcghee    MRN: [id number]           Patient Information     Date Of Birth          1965        Visit Information        Provider Department      6/20/2017 9:00 AM Nurse, [facility] Solid [facility] Transplant        Today's Diagnoses     Post-pancreatectomy diabetes (H)    -  1       Follow-ups after your visit        Your next 10 appointments already scheduled     Jun 20, 2017  2:00 PM CDT   (Arrive by 1:45 PM)   Return Auto Islet with Nestor Phoenix MD   [facility] Solid Organ Transplant ([facility])    [address] [place] [address]  3rd [facility] [address]   [phone]            Jun 26, 2017 12:00 PM CDT   (Arrive by 11:45 AM)   Return Visit with Maria Elena Abdalla,    [facility] ([facility])    [address] [place] [address]   [phone]            Aug 04, 2017  9:00 AM CDT   (Arrive by 8:45 AM)   Return Visit with Jesse Richardsno DO   [facility] Rheumatology ([facility])    [address] [place] [id number]   [phone]            Aug 08, 2017  2:20 PM CDT   (Arrive by 2:05 PM)   Return Visit with Martinez Arteaga MD   [facility] ([facility])    [address] [place] [address]  4th [facility] [address]   [phone]            Aug 14, 2017  7:45 AM CDT   Lab with  LAB   [facility] Lab ([facility])    [address] [place] [address]   [phone]            Aug 14, 2017  8:50 AM CDT   (Arrive by 8:35 AM)   Return General Liver with Ara Lugo MD   [facility] Hepatology ([facility])    [address] [place] [address]   [phone]              Who to contact     If you have questions  "or need follow up information about today's clinic visit or your schedule please contact [facility] SOLID ORGAN TRANSPLANT directly at [phone].  Normal or non-critical lab and imaging results will be communicated to you by MyChart, letter or phone within 4 business days after the clinic has received the results. If you do not hear from us within 7 days, please contact the clinic through [employer] or phone. If you have a critical or abnormal lab result, we will notify you by phone as soon as possible.  Submit refill requests through [employer] or call your pharmacy and they will forward the refill request to us. Please allow 3 business days for your refill to be completed.          Additional Information About Your Visit        [employer] Information     [employer] gives you secure access to your electronic health record. If you see a primary care provider, you can also send messages to your care team and make appointments. If you have questions, please call your primary care clinic.  If you do not have a primary care provider, please call [phone] and they will assist you.        Care EveryWhere ID     This is your Care EveryWhere ID. This could be used by other organizations to access your [facility] medical records  [id number]        Your Vitals Were     Height BMI (Body Mass Index)                1.727 m (5' 8\") 22.55 kg/m2           Blood Pressure from Last 3 Encounters:   06/19/17 120/76   05/20/17 110/68   05/12/17 103/62    Weight from Last 3 Encounters:   06/20/17 67.3 kg (148 lb 4.8 oz)   06/19/17 69.2 kg (152 lb 8 oz)   05/20/17 68 kg (150 lb)              Today, you had the following     No orders found for display       Primary Care Provider Office Phone # Fax #    Justyna Lawson [phone]       [facility] [place] [address]  [place] [facility] [address]        Thank you!     Thank you for choosing [facility] SOL[facility] ORGAN TRANSPL[facility]  for your care. Our goal is always to provide you with " excellent care. Hearing back from our patients is one way we can continue to improve our services. Please take a few minutes to complete the written survey that you may receive in the mail after your visit with us. Thank you!             Your Updated Medication List - Protect others around you: Learn how to safely use, store and throw away your medicines at www.disposemymeds.org.          This list is accurate as of: 6/20/17  9:42 AM.  Always use your most recent med list.                   Brand Name Dispense Instructions for use    amitriptyline 10 MG tablet    ELAVIL    60 tablet    Take 2 tablets (20 mg) by mouth At Bedtime       amylase-lipase-protease 54369 UNITS Cpep per EC capsule    CREON 24    450 capsule    Take 3-4 capsules by mouth with meals and 1-2 with snacks. Maximum 15 capsules per day.       aspirin 81 MG EC tablet     90 tablet    Take 1 tablet (81 mg) by mouth daily       BD SHARPS  Misc     1 each    1 Container as needed       blood glucose monitoring lancets     1 Box    Use to test blood sugar 6-8 times daily or as directed.       blood glucose monitoring test strip    PAT CONTOUR NEXT    200 strip    Use to test blood sugar 6-8 times daily or as directed.       * BOOST HIGH PROTEIN Liqd      After above baseline labs are drawn, give: 6 mL/kg to maximum of 360 mL; the beverage is to be consumed within 5 minutes.       * BOOST HIGH PROTEIN Liqd      After above baseline labs are drawn, give: 6 mL/kg to maximum of 360 mL; the beverage is to be consumed within 5 minutes.       celecoxib 100 MG capsule    celeBREX    60 capsule    Take 1 capsule (100 mg) by mouth 2 times daily       cetirizine 10 MG tablet    zyrTEC     Take 10 mg by mouth daily       CONTOUR NEXT EZ MONITOR W/DEVICE Kit      1 Device 6 times daily       cyclobenzaprine 5 MG tablet    FLEXERIL    42 tablet    Take 5-10 mg by mouth three times per day as needed for muscle spasms.       diphenhydrAMINE 25 MG capsule     BENADRYL ALLERGY    56 capsule    Take 1 capsule (25 mg) by mouth every 6 hours as needed for itching or allergies       docusate sodium 100 MG capsule    COLACE    60 capsule    Take 1 capsule (100 mg) by mouth 2 times daily as needed for constipation,       glucagon 1 MG kit    GLUCAGON EMERGENCY    1 mg    Inject 1 mg into the muscle once for 1 dose       glucose 40 % Gel gel     3 Tube    Take 15-30 g by mouth every 15 minutes as needed for low blood sugar       Injection Device for insulin Tika    NOVOPEN ECHO    1 each    1 each daily as needed       * insulin aspart 100 UNIT/ML injection    NovoLOG PEN     aspart medium correction 1 unit per 50 > 130 every 4 hours ? For Pre-Meal  - 179 give 1 unit.  For Pre-Meal  - 230 give 2 units.  For Pre-Meal  - 281 give 3 units.  For Pre-Meal  - 332 give 4 units.       * insulin aspart 100 UNIT/ML injection    NovoLOG PENFILL    3 mL    Administer subcutaneously 0.5 unit per 45 grams of carbohydrates.       insulin glargine 100 UNIT/ML injection    LANTUS    3 mL    Inject 5 units subcutaneously every morning.       insulin pen needle 32G X 4 MM    BD KEN U/F    100 each    Use 6-8 times per day       LANsoprazole 30 MG CR capsule    PREVACID    30 capsule    Take 1 capsule (30 mg) by mouth daily Take 30-60 minutes before a meal.       levothyroxine 125 MCG tablet    SYNTHROID/LEVOTHROID    1 tablet    Take 1 tablet (125 mcg), by mouth daily before breakfast.       LORazepam 0.5 MG tablet    ATIVAN    20 tablet    Take 1 tablet (0.5 mg) by mouth every 6 hours as needed for anxiety       Lubiprostone 8 MCG Caps capsule     90 capsule    Take 1 capsule (8 mcg) by mouth 2 times daily. Take 1 capsule by mouth po BID x 5 days. If no effect, increase to 2 capsules po BID x 5 days. If no effect, increase to 3 capsules po BID and continue until further instruction.       multivitamin CF formula capsule    CHOICEFUL     Take 1 tablet by mouth daily        oxyCODONE 5 MG IR tablet    ROXICODONE    150 tablet    Take 1-2 tablets (5-10 mg) by mouth every 4 hours as needed for moderate to severe pain       polyethylene glycol Packet    MIRALAX/GLYCOLAX    7 packet    Take 17 g by mouth daily as needed for constipation       pregabalin 25 MG capsule    LYRICA    180 capsule    Take 3 capsules (75 mg) by mouth 2 times daily       prochlorperazine 5 MG tablet    COMPAZINE    90 tablet    Take two 5 mg tablets by mouth every six hours as needed for nausea.       senna-docusate 8.6-50 MG per tablet    SENOKOT-S;PERICOLACE    100 tablet    Take 1 tablet by mouth 2 times daily as needed for constipation       * Notice:  This list has 4 medication(s) that are the same as other medications prescribed for you. Read the directions carefully, and ask your doctor or other care provider to review them with you.

## 2017-06-20 NOTE — PROGRESS NOTES
Chronic Pancreatitis Group Progress Note    I had the pleasure of seeing Ms. Dinora Mcghee today. 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   Interval events since last visit with me:  She has had an uptick in itching (ear, head, and upper arm/back)   ER visits = 1, reasons: abdominal pain.   Inpatient admissions = 0, reasons n/a  The patient reports their current symptoms to be:   GI function:   Nausea:   [x]  none    []  rare    []  often    []  daily  Comment: mornings/x 3 weeks  Vomiting: [x]  none    []  rare    []  often    []  daily   Comment:   Last BM: Today.  Stools are soft, frequency : floaty lately. Undigested greens yesterday.   Appetite: good  Oral food intake: 3 per day  Pancreatic exocrine insufficiency:   Takes (Creon 24), 3-5 with meals, 2 with snacks.  Greasy stools: []  none   [x]  rarely    []  several times/wk   []  daily      Comment: very floaty, hard to flush  Diabetes management:   Long acting insulin: Lantus--3 units/day  Short acting insulin: Novolog--0-2 units/day  Blood sugars typically range from 80 to 188.   Lab Results   Component Value Date    A1C 5.2 06/20/2017    A1C 5.6 04/04/2017    A1C 4.5 12/19/2016      Pain management:   Pain rating (0=no pain, 10=unbearable): 4  Short acting agent: none.  Weaning from Lyrica  Daily 'Uptime': all day, starting to wonder if could do part time work.  Walking: Y exercise, ~5 times per week    Prescription Medications as of 6/20/2017             pregabalin (LYRICA) 25 MG capsule Take 3 capsules (75 mg) by mouth 2 times daily    blood glucose monitoring (PAT CONTOUR NEXT) test strip Use to test blood sugar 6-8 times daily or as directed.    amylase-lipase-protease (CREON 24) 51450 UNITS CPEP per EC capsule Take 3-4 capsules by mouth with meals and 1-2 with snacks. Maximum 15 capsules per day.    insulin glargine (LANTUS) 100 UNIT/ML injection Inject 5 units subcutaneously every morning.    insulin pen needle  (BD KEN U/F) 32G X 4 MM Use 6-8 times per day    celecoxib (CELEBREX) 100 MG capsule Take 1 capsule (100 mg) by mouth 2 times daily    Nutritional Supplements (BOOST HIGH PROTEIN) LIQD After above baseline labs are drawn, give: 6 mL/kg to maximum of 360 mL; the beverage is to be consumed within 5 minutes.    cetirizine (ZYRTEC) 10 MG tablet Take 10 mg by mouth daily    LORazepam (ATIVAN) 0.5 MG tablet Take 1 tablet (0.5 mg) by mouth every 6 hours as needed for anxiety    Lubiprostone 8 MCG CAPS capsule Take 1 capsule (8 mcg) by mouth 2 times daily.  Take 1 capsule by mouth po BID x 5 days.  If no effect, increase to 2 capsules po BID x 5 days.  If no effect, increase to 3 capsules po BID and continue until further instruction.    insulin aspart (NOVOLOG PENFILL) 100 UNIT/ML injection Administer subcutaneously 0.5 unit per 45 grams of carbohydrates.    Nutritional Supplements (BOOST HIGH PROTEIN) LIQD After above baseline labs are drawn, give: 6 mL/kg to maximum of 360 mL; the beverage is to be consumed within 5 minutes.    cyclobenzaprine (FLEXERIL) 5 MG tablet Take 5-10 mg by mouth three times per day as needed for muscle spasms.    oxyCODONE (ROXICODONE) 5 MG IR tablet Take 1-2 tablets (5-10 mg) by mouth every 4 hours as needed for moderate to severe pain    insulin aspart (NOVOLOG PEN) 100 UNIT/ML injection aspart medium correction 1 unit per 50 > 130 every 4 hours    For Pre-Meal  - 179 give 1 unit.   For Pre-Meal  - 230 give 2 units.   For Pre-Meal  - 281 give 3 units.   For Pre-Meal  - 332 give 4 units.    amitriptyline (ELAVIL) 10 MG tablet Take 2 tablets (20 mg) by mouth At Bedtime    senna-docusate (SENOKOT-S;PERICOLACE) 8.6-50 MG per tablet Take 1 tablet by mouth 2 times daily as needed for constipation    prochlorperazine (COMPAZINE) 5 MG tablet Take two 5 mg tablets by mouth every six hours as needed for nausea.    polyethylene glycol (MIRALAX/GLYCOLAX) Packet Take 17 g by mouth  daily as needed for constipation    diphenhydrAMINE (BENADRYL ALLERGY) 25 MG capsule Take 1 capsule (25 mg) by mouth every 6 hours as needed for itching or allergies    levothyroxine (SYNTHROID/LEVOTHROID) 125 MCG tablet Take 1 tablet (125 mcg), by mouth daily before breakfast.    docusate sodium (COLACE) 100 MG capsule Take 1 capsule (100 mg) by mouth 2 times daily as needed for constipation,    multivitamin CF formula (CHOICEFUL) capsule Take 1 tablet by mouth daily    Blood Glucose Monitoring Suppl (CONTOUR NEXT EZ MONITOR) W/DEVICE KIT 1 Device 6 times daily    glucagon (GLUCAGON EMERGENCY) 1 MG kit Inject 1 mg into the muscle once for 1 dose    LANsoprazole (PREVACID) 30 MG CR capsule Take 1 capsule (30 mg) by mouth daily Take 30-60 minutes before a meal.    aspirin 81 MG EC tablet Take 1 tablet (81 mg) by mouth daily    Injection Device for insulin (NOVOPEN ECHO) MARCO 1 each daily as needed    glucose 40 % GEL gel Take 15-30 g by mouth every 15 minutes as needed for low blood sugar    Sharps Container ([email or web address].es SHARPS ) MISC 1 Container as needed    blood glucose monitoring ([employer] MICROLET) lancets Use to test blood sugar 6-8 times daily or as directed.      Facility Administered Medications as of 6/20/2017             heparin 100 UNIT/ML injection 5 mL 5 mLs by Intracatheter route once        Physical exam:   General Appearance: in no apparent distress.   Temp:  [97.8  F (36.6  C)] 97.8  F (36.6  C)  Pulse:  [95] 95  Resp:  [16] 16  BP: (102)/(71) 102/71  SpO2:  [97 %] 97 %   Skin: Normal, no rashes or jaundice  Heart: Regular rhythm.  Lungs: no audible wheezes or increased work of breathing.  Abdomen: The abdomen is flat, and the wound is welll healed, without hernia. The abdomen is mildly tender, RUQ.    Edema: absent.    Chronic Pancreatitis Latest Ref Rng & Units 6/20/2017 6/20/2017 6/20/2017 6/20/2017 6/20/2017   Weight - - 67.268 kg - - 67.3 kg   AMYLASE 30 - 110 U/L - - - - -   LIPASE 73 - 393 U/L -  - - - -   HEMOGLOBIN A1C 4.3 - 6.0 % 5.2 - - - -   C PEPTIDE 0.9 - 6.9 ng/mL 0.6(L) - 3.2 3.7 -   GLUCOSE 70 - 99 mg/dL 80 - 178(H) 103(H) -   HGB 11.7 - 15.7 g/dL 12.7 - - - -    - 450 10e9/L 447 - - - -   ALBUMIN 3.4 - 5.0 g/dL 3.7 - - - -   PREALBUMIN 15 - 45 mg/dL 18 - - - -       Assessment and Plan: She is doing fairly well s/p TP-IAT.  Interval progress has been good.  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   Pancreatic exocrine insufficiency: taking appropriate Creon but emergence of MCKENNA.  Sounds consistent with bacterial overgrowth.  Would do an empiric treatment with flagyl 250 mg TID x 10 days.  Malnutrition: not an issue  Diabetes mellitus: Excellent MMT today.  Further insulin weaning recs per Endo.  Abdominal pain management: working with Dr. Cox, and focussing on L shoulder issues.  Activity: I would be comfortable with a trial of return to work part time (no more than 4 hours per day) as long as no lifting or twisting is required.  Followup: I will see her again in clinic in 6  months, or as needed.    Total time: 25 min, Counselling Time: 20 min.          Nestor Phoenix MD  Department of Surgery

## 2017-06-20 NOTE — LETTER
6/20/2017      RE: Dinora Mcghee  [address]  [facility] [address]       Chronic Pancreatitis Group Progress Note    I had the pleasure of seeing Ms. Dinora Mcghee today. 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   Interval events since last visit with me:  She has had an uptick in itching (ear, head, and upper arm/back)   ER visits = 1, reasons: abdominal pain.   Inpatient admissions = 0, reasons n/a  The patient reports their current symptoms to be:   GI function:   Nausea:   [x]  none    []  rare    []  often    []  daily  Comment: mornings/x 3 weeks  Vomiting: [x]  none    []  rare    []  often    []  daily   Comment:   Last BM: Today.  Stools are soft, frequency : floaty lately. Undigested greens yesterday.   Appetite: good  Oral food intake: 3 per day  Pancreatic exocrine insufficiency:   Takes (Creon 24), 3-5 with meals, 2 with snacks.  Greasy stools: []  none   [x]  rarely    []  several times/wk   []  daily      Comment: very floaty, hard to flush  Diabetes management:   Long acting insulin: Lantus--3 units/day  Short acting insulin: Novolog--0-2 units/day  Blood sugars typically range from 80 to 188.   Lab Results   Component Value Date    A1C 5.2 06/20/2017    A1C 5.6 04/04/2017    A1C 4.5 12/19/2016      Pain management:   Pain rating (0=no pain, 10=unbearable): 4  Short acting agent: none.  Weaning from Lyrica  Daily 'Uptime': all day, starting to wonder if could do part time work.  Walking: Y exercise, ~5 times per week    Prescription Medications as of 6/20/2017             pregabalin (LYRICA) 25 MG capsule Take 3 capsules (75 mg) by mouth 2 times daily    blood glucose monitoring (PAT CONTOUR NEXT) test strip Use to test blood sugar 6-8 times daily or as directed.    amylase-lipase-protease (CREON 24) 49057 UNITS CPEP per EC capsule Take 3-4 capsules by mouth with meals and 1-2 with snacks. Maximum 15 capsules per day.    insulin glargine (LANTUS) 100  UNIT/ML injection Inject 5 units subcutaneously every morning.    insulin pen needle (BD KEN U/F) 32G X 4 MM Use 6-8 times per day    celecoxib (CELEBREX) 100 MG capsule Take 1 capsule (100 mg) by mouth 2 times daily    Nutritional Supplements (BOOST HIGH PROTEIN) LIQD After above baseline labs are drawn, give: 6 mL/kg to maximum of 360 mL; the beverage is to be consumed within 5 minutes.    cetirizine (ZYRTEC) 10 MG tablet Take 10 mg by mouth daily    LORazepam (ATIVAN) 0.5 MG tablet Take 1 tablet (0.5 mg) by mouth every 6 hours as needed for anxiety    Lubiprostone 8 MCG CAPS capsule Take 1 capsule (8 mcg) by mouth 2 times daily.  Take 1 capsule by mouth po BID x 5 days.  If no effect, increase to 2 capsules po BID x 5 days.  If no effect, increase to 3 capsules po BID and continue until further instruction.    insulin aspart (NOVOLOG PENFILL) 100 UNIT/ML injection Administer subcutaneously 0.5 unit per 45 grams of carbohydrates.    Nutritional Supplements (BOOST HIGH PROTEIN) LIQD After above baseline labs are drawn, give: 6 mL/kg to maximum of 360 mL; the beverage is to be consumed within 5 minutes.    cyclobenzaprine (FLEXERIL) 5 MG tablet Take 5-10 mg by mouth three times per day as needed for muscle spasms.    oxyCODONE (ROXICODONE) 5 MG IR tablet Take 1-2 tablets (5-10 mg) by mouth every 4 hours as needed for moderate to severe pain    insulin aspart (NOVOLOG PEN) 100 UNIT/ML injection aspart medium correction 1 unit per 50 > 130 every 4 hours    For Pre-Meal  - 179 give 1 unit.   For Pre-Meal  - 230 give 2 units.   For Pre-Meal  - 281 give 3 units.   For Pre-Meal  - 332 give 4 units.    amitriptyline (ELAVIL) 10 MG tablet Take 2 tablets (20 mg) by mouth At Bedtime    senna-docusate (SENOKOT-S;PERICOLACE) 8.6-50 MG per tablet Take 1 tablet by mouth 2 times daily as needed for constipation    prochlorperazine (COMPAZINE) 5 MG tablet Take two 5 mg tablets by mouth every six hours as  needed for nausea.    polyethylene glycol (MIRALAX/GLYCOLAX) Packet Take 17 g by mouth daily as needed for constipation    diphenhydrAMINE (BENADRYL ALLERGY) 25 MG capsule Take 1 capsule (25 mg) by mouth every 6 hours as needed for itching or allergies    levothyroxine (SYNTHROID/LEVOTHROID) 125 MCG tablet Take 1 tablet (125 mcg), by mouth daily before breakfast.    docusate sodium (COLACE) 100 MG capsule Take 1 capsule (100 mg) by mouth 2 times daily as needed for constipation,    multivitamin CF formula (CHOICEFUL) capsule Take 1 tablet by mouth daily    Blood Glucose Monitoring Suppl (CONTOUR NEXT EZ MONITOR) W/DEVICE KIT 1 Device 6 times daily    glucagon (GLUCAGON EMERGENCY) 1 MG kit Inject 1 mg into the muscle once for 1 dose    LANsoprazole (PREVACID) 30 MG CR capsule Take 1 capsule (30 mg) by mouth daily Take 30-60 minutes before a meal.    aspirin 81 MG EC tablet Take 1 tablet (81 mg) by mouth daily    Injection Device for insulin (NOVOPEN ECHO) MARCO 1 each daily as needed    glucose 40 % GEL gel Take 15-30 g by mouth every 15 minutes as needed for low blood sugar    Sharps Container (BD SHARPS ) MISC 1 Container as needed    blood glucose monitoring (PAT MICROLET) lancets Use to test blood sugar 6-8 times daily or as directed.      Facility Administered Medications as of 6/20/2017             heparin 100 UNIT/ML injection 5 mL 5 mLs by Intracatheter route once        Physical exam:   General Appearance: in no apparent distress.   Temp:  [97.8  F (36.6  C)] 97.8  F (36.6  C)  Pulse:  [95] 95  Resp:  [16] 16  BP: (102)/(71) 102/71  SpO2:  [97 %] 97 %   Skin: Normal, no rashes or jaundice  Heart: Regular rhythm.  Lungs: no audible wheezes or increased work of breathing.  Abdomen: The abdomen is flat, and the wound is welll healed, without hernia. The abdomen is mildly tender, RUQ.    Edema: absent.    Chronic Pancreatitis Latest Ref Rng & Units 6/20/2017 6/20/2017 6/20/2017 6/20/2017 6/20/2017    Weight - - 67.268 kg - - 67.3 kg   AMYLASE 30 - 110 U/L - - - - -   LIPASE 73 - 393 U/L - - - - -   HEMOGLOBIN A1C 4.3 - 6.0 % 5.2 - - - -   C PEPTIDE 0.9 - 6.9 ng/mL 0.6(L) - 3.2 3.7 -   GLUCOSE 70 - 99 mg/dL 80 - 178(H) 103(H) -   HGB 11.7 - 15.7 g/dL 12.7 - - - -    - 450 10e9/L 447 - - - -   ALBUMIN 3.4 - 5.0 g/dL 3.7 - - - -   PREALBUMIN 15 - 45 mg/dL 18 - - - -       Assessment and Plan: She is doing fairly well s/p TP-IAT.  Interval progress has been good.  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   Pancreatic exocrine insufficiency: taking appropriate Creon but emergence of MCKENNA.  Sounds consistent with bacterial overgrowth.  Would do an empiric treatment with flagyl 250 mg TID x 10 days.  Malnutrition: not an issue  Diabetes mellitus: Excellent MMT today.  Further insulin weaning recs per Endo.  Abdominal pain management: working with Dr. Cox, and focussing on L shoulder issues.  Activity: I would be comfortable with a trial of return to work part time (no more than 4 hours per day) as long as no lifting or twisting is required.  Followup: I will see her again in clinic in 6  months, or as needed.    Total time: 25 min, Counselling Time: 20 min.          Nestor Phoenix MD  Department of Surgery

## 2017-06-21 ENCOUNTER — MYC MEDICAL ADVICE (OUTPATIENT)
Dept: RHEUMATOLOGY | Facility: CLINIC | Age: 52
End: 2017-06-21

## 2017-06-21 LAB
FOLATE RBC-MCNC: 993 NG/ML
HCT VFR BLD CALC: NORMAL %
M TB TUBERC IFN-G BLD QL: NEGATIVE
M TB TUBERC IFN-G/MITOGEN IGNF BLD: 0 IU/ML

## 2017-06-21 NOTE — LETTER
July 12, 2017    Dinora Larson  [address] [facility] [address]        To Whom it May Concern:    I am writing to you today to request that Rituximab be approved for Ms. Larson, who is priscila woman who likely has IgG4 disease.   Her biopsy containing > 50 IgG4 staining plasma cells is highly suggestive of IgG4 related disease as well and meets recent proposed pathologic recommendations of IgG4 related disease.  This patient has IgG4 disease affecting liver and pancreas. Unfortunately her liver enzymes are getting worse and we highly recommend rituximab as next step to treat IgG4 disease.     I do have 2 studies that I would like to provide to help you approved the use of Rituximab in this disease.   1. Rituximab therapy leads to rapid decline of serum IgG4 levels and prompt clinical improvement                    in IgG4-related systemic disease.  AU Khosroshahi A, Bloch DB, Annabel HITCHCOCK, Aristides GREEN SO                   Arthritis Rheum. 2010;62(6):1755.     2. Rituximab for the treatment of IgG4-related disease: lessons from 10 consecutive patients.  Guerda French, Annabel HITCHCOCK, Mino S, Bloch DB, Aristides GREEN SO Medicine                        ([facility]. 2012;91(1):57.      Thank you for your time and consideration of use of Rituximab.  I will await your decision.      Sincerely,    Anita Becerra MD    Division of Rheumatic and Autoimmune Diseases  [address] [place] [address] [phone]

## 2017-06-22 LAB — ZINC SERPL-MCNC: 72 UG/ML

## 2017-06-23 LAB
A-TOCOPHEROL VIT E SERPL-MCNC: 11.2
ANNOTATION COMMENT IMP: NORMAL
BETA+GAMMA TOCOPHEROL SERPL-MCNC: 0.3 MG/DL
DEPRECATED CALCIDIOL+CALCIFEROL SERPL-MC: NORMAL UG/L (ref 20–75)
RETINYL PALMITATE SERPL-MCNC: NORMAL UG/ML
VIT A SERPL-MCNC: 0.36 UG/ML
VITAMIN D2 SERPL-MCNC: <5 UG/L
VITAMIN D3 SERPL-MCNC: 46 UG/L

## 2017-06-23 ASSESSMENT — ACTIVITIES OF DAILY LIVING (ADL): I_KEEP_THINKING_ABOUT_HOW_BADLY_I_WANT_THE_PAIN_TO_STOP: 0 = NOT AT ALL

## 2017-06-26 ENCOUNTER — OFFICE VISIT (OUTPATIENT)
Dept: ANESTHESIOLOGY | Facility: CLINIC | Age: 52
End: 2017-06-26

## 2017-06-26 DIAGNOSIS — F43.89 ADJUSTMENT REACTION TO CHRONIC STRESS: Primary | ICD-10-CM

## 2017-06-26 PROBLEM — R76.8 IGG4 SELECTIVELY HIGH IN PLASMA: Status: ACTIVE | Noted: 2017-06-26

## 2017-06-26 PROBLEM — D80.3: Status: ACTIVE | Noted: 2017-06-26

## 2017-06-26 RX ORDER — METHYLPREDNISOLONE SODIUM SUCCINATE 125 MG/2ML
125 INJECTION, POWDER, LYOPHILIZED, FOR SOLUTION INTRAMUSCULAR; INTRAVENOUS ONCE
Status: CANCELLED | OUTPATIENT
Start: 2017-06-26

## 2017-06-26 RX ORDER — ACETAMINOPHEN 325 MG/1
650 TABLET ORAL ONCE
Status: CANCELLED
Start: 2017-06-26

## 2017-06-26 NOTE — TELEPHONE ENCOUNTER
----- Message from Anita Becerra MD sent at 6/23/2017 11:36 PM CDT -----  Regarding: RE: IV Rituximab?  Agree, did you notify Dr. Lugo? Radha, could you please place tx plan for rituximab 1 gram q2wk x 2 doses, Dx IgG-4 disease (we might have hard time approving it by insurance). Thanks   ----- Message -----     From: Jesse Richardson DO     Sent: 6/23/2017  12:18 PM       To: Anita Becerra MD  Subject: IV Rituximab?                                    I have been getting [employer] message about this patient that we saw together in the past.     We had recommended IV rituximab if GI felt that her liver manifestations of IgG4 warranted more than prednisone. It appears that her LFTs went up again and they wanted to escalate therapies.     I dont think she needs to come see us again before setting up a therapy plan for rituximab. We checked her Hep B, core, and TB, Hep C and she is all good to go.    Do you agree? I figured I should ask before starting someone on Rituximab.     Jesse Richardson

## 2017-06-26 NOTE — PROGRESS NOTES
[facility]   Comprehensive Pain Program   Psychology Progress Note    Patient Name: Dinora Mcghee    YOB: 1965   Medical Record Number: [id number]  Date: 6/26/2017              SUBJECTIVE                 Interval history: left shoulder really bothering her but pleased re her post op reports   worried about the infusion for the IGE  syndrome       OBJECTIVE:              Length of Visit: 60 minutes        Mental status: Marked Distress and Tearful         Session objective:   Continued behavioral pain management skill improvement         Behavioral inventions and response:                 Auditory EMDR/Bilateral sound during session   Along with            CBT   On differences in   Coping strategies among people            Breathing Imagery    Taught heart                Resourced Brainspotting/Eye position therapy Target  is  l shoulder pain                                   Resource spot   Sensory pleasure   .          Beginning  SUDS =  5              Ending SUDS  =  1    Processed some traumatic situations where she felt trapped  In her post op period  And loneliness of hospital  And how family has returned to baseline and she is confused about how to ask for help as a person not a patient                                                              ASSESSMENT          Diagnoses:                              F54      Psychological Factors affecting other medical condition                                                                 Psychosocial and Contextual Factors: had fluctuating course         Progress toward goals: as expected.              Pain status since onset of pain services: improved           Emotional status since onset of pain services: improved       Medication / chemical use concerns: None        PLAN:               Next Appointment: Dinora Mcghee will schedule a follow-up appointment in 2 weeks.         Assignment: Establish a daily routine of stretching and  exercise, Healthy breathing skills  for daily use and Utilize Bibliotherapy         Objectives / interventions for next session:          Improve pain management skills: Goals include:  Cognitive therapy: create constructive thought processes and beliefs regarding pain and Resource Brainspotting/Eye Position therapy               Maria Elena Abdalla, Ph.D., L.P. ...............6/26/2017 12:00 PM                Medical Psychologist

## 2017-06-26 NOTE — MR AVS SNAPSHOT
After Visit Summary   6/26/2017    Dinora Mcghee    MRN: [id number]           Patient Information     Date Of Birth          1965        Visit Information        Provider Department      6/26/2017 12:00 PM Maria Elena Abdalla, PhD [facility]        Today's Diagnoses     Adjustment reaction to chronic stress    -  1      Care Instructions       Imagery Breathing        Ocean  Breathing:  To reduce sympathetic nervous system activity while staying alert      Instructions-    Gently,  Gently,  With low intensity    Eyes  Open or closed -- personal preference      Breathe in through your nose to the count of 5    Breathe out through you mouth to the count of 5      do 5 cycles and then scan your body and notice any changes    Repeat as desired      *   Extra credit for placing hands on abdomen and noticing the rise  And fall of your hands  As you shift toward  Abdominal breathing and away from chest breathing    Imagery Breathing for home use   When you need soothing,  Feeling sad,  Tired,  hurt      Gently,  Gently and with soft intensity -    Imagine you are inhaling from the germán and earth ( at the same time)    Breathe in from germán and down into your head and into your chest   AND  Breathe in from the earth, up through your legs and body into the chest.    As the breath ( from germán and earth) reaches your chest see the breath going into your heart,  The 4 chambered organ that pumps blood     see the breath in your pumping heart for 4 seconds    Exhale out the front and back of your heart to the areas of pain  OR  To your whole body.              Follow-ups after your visit        Your next 10 appointments already scheduled     Aug 04, 2017  9:00 AM CDT   (Arrive by 8:45 AM)   Return Visit with Jesse Richardson DO   [facility] ([facility])    [address] [place] [address] [phone]             Aug 08, 2017  2:20 PM CDT   (Arrive by 2:05 PM)   Return Visit with Martinez Arteaga MD   [facility] ([facility])    [address]  4th [address]  [facility] [address] [phone]            Aug 14, 2017  7:45 AM CDT   Lab with [facility]EY[facility]   [facility] ([facility])    [address]  1st Floor  [facility] [address] [phone]            Aug 14, 2017  8:50 AM CDT   (Arrive by 8:35 AM)   Return General Liver with Ara Lugo MD   [facility] Hepat[facility] ([facility])    [address]  3rd Floor  [facility] [address] [phone]              Who to contact     Please call your clinic at [phone] to:    Ask questions about your health    Make or cancel appointments    Discuss your medicines    Learn about your test results    Speak to your doctor   If you have compliments or concerns about an experience at your clinic, or if you wish to file a complaint, please contact [facility] Patient Relations at [phone] or email us at [email or web address]         Additional Information About Your Visit        [employer] Information     [employer] gives you secure access to your electronic health record. If you see a primary care provider, you can also send messages to your care team and make appointments. If you have questions, please call your primary care clinic.  If you do not have a primary care provider, please call [phone] and they will assist you.      [employer] is an electronic gateway that provides easy, online access to your medical records. With [employer], you can request a clinic appointment, read your test results, renew a prescription or communicate with your care team.     To access your existing account, please contact your [facility] or call [phone] for assistance.         Care EveryWhere ID     This is your Care EveryWhere ID. This could be used by other organizations to access your [place] medical records  [id number]         Blood Pressure from Last 3 Encounters:   06/20/17 102/71   06/19/17 120/76   05/20/17 110/68    Weight from Last 3 Encounters:   06/20/17 67.3 kg (148 lb 5.9 oz)   06/20/17 67.3 kg (148 lb 4.8 oz)   06/19/17 69.2 kg (152 lb 8 oz)              Today, you had the following     No orders found for display       Primary Care Provider Office Phone # Fax #    Justyna Lawson [phone]       [facility] [place] [address] [facility] [address] [facility] [address]  [place] [address]        Equal Access to Services     ABHAY HUITRON : Hadii aad ku hadasho Soomaali, waaxda luqadaha, qaybta kaalmada adeegyada, waxay hannain haycandyn adedonavan wallace . So [facility] [phone].    ATENCIÓN: Si habla español, tiene a muñoz disposición servicios gratuitos de asistencia lingüística. Llame al [phone].    We comply with applicable federal civil rights laws and [place] laws. We do not discriminate on the basis of race, color, national origin, age, disability sex, sexual orientation or gender identity.            Thank you!     Thank you for choosing [facility]  for your care. Our goal is always to provide you with excellent care. Hearing back from our patients is one way we can continue to improve our services. Please take a few minutes to complete the written survey that you may receive in the mail after your visit with us. Thank you!             Your Updated Medication List - Protect others around you: Learn how to safely use, store and throw away your medicines at www.disposemymeds.org.          This list is accurate as of: 6/26/17  1:04 PM.  Always use your most recent med list.                   Brand Name Dispense Instructions for use Diagnosis    amitriptyline 10 MG tablet    ELAVIL    60 tablet    Take 2 tablets (20 mg) by mouth At Bedtime     Itching, Post-pancreatectomy diabetes (H), History of pancreatectomy, Pancreatic insufficiency       amylase-lipase-protease 68083 UNITS Cpep per EC capsule    CREON 24    450 capsule    Take 3-4 capsules by mouth with meals and 1-2 with snacks. Maximum 15 capsules per day.    Acquired total absence of pancreas       aspirin 81 MG EC tablet     90 tablet    Take 1 tablet (81 mg) by mouth daily    High platelet count (H)       BD SHARPS  Misc     1 each    1 Container as needed    Post-pancreatectomy diabetes (H)       blood glucose monitoring lancets     1 Box    Use to test blood sugar 6-8 times daily or as directed.    Acute on chronic pancreatitis (H)       blood glucose monitoring test strip    PAT CONTOUR NEXT    200 strip    Use to test blood sugar 6-8 times daily or as directed.    Acute on chronic pancreatitis (H), Post-pancreatectomy diabetes (H)       * BOOST HIGH PROTEIN Liqd      After above baseline labs are drawn, give: 6 mL/kg to maximum of 360 mL; the beverage is to be consumed within 5 minutes.    Post-pancreatectomy diabetes (H), Pancreatic insufficiency, History of pancreatectomy       * BOOST HIGH PROTEIN Liqd      After above baseline labs are drawn, give: 6 mL/kg to maximum of 360 mL; the beverage is to be consumed within 5 minutes.    Post-pancreatectomy diabetes (H), Pancreatic insufficiency       celecoxib 100 MG capsule    celeBREX    60 capsule    Take 1 capsule (100 mg) by mouth 2 times daily    Acute post-operative pain       cetirizine 10 MG tablet    zyrTEC     Take 10 mg by mouth daily    Elevated liver enzymes       CONTOUR NEXT EZ MONITOR W/DEVICE Kit      1 Device 6 times daily    Itching, Post-pancreatectomy diabetes (H), History of pancreatectomy, Pancreatic insufficiency       cyclobenzaprine 5 MG tablet    FLEXERIL    42 tablet    Take 5-10 mg by mouth three times per day as needed for muscle spasms.    Muscle spasm       diphenhydrAMINE 25 MG capsule    BENADRYL  ALLERGY    56 capsule    Take 1 capsule (25 mg) by mouth every 6 hours as needed for itching or allergies    Itching, Post-pancreatectomy diabetes (H), History of pancreatectomy, Pancreatic insufficiency       docusate sodium 100 MG capsule    COLACE    60 capsule    Take 1 capsule (100 mg) by mouth 2 times daily as needed for constipation,    Itching, Post-pancreatectomy diabetes (H), History of pancreatectomy, Pancreatic insufficiency       glucagon 1 MG kit    GLUCAGON EMERGENCY    1 mg    Inject 1 mg into the muscle once for 1 dose    Post-pancreatectomy diabetes (H)       glucose 40 % Gel gel     3 Tube    Take 15-30 g by mouth every 15 minutes as needed for low blood sugar    Acquired total absence of pancreas       Injection Device for insulin Tika    NOVOPEN ECHO    1 each    1 each daily as needed    Post-pancreatectomy diabetes (H)       * insulin aspart 100 UNIT/ML injection    NovoLOG PEN     aspart medium correction 1 unit per 50 > 130 every 4 hours ? For Pre-Meal  - 179 give 1 unit.  For Pre-Meal  - 230 give 2 units.  For Pre-Meal  - 281 give 3 units.  For Pre-Meal  - 332 give 4 units.    Acquired total absence of pancreas       * insulin aspart 100 UNIT/ML injection    NovoLOG PENFILL    3 mL    Administer subcutaneously 0.5 unit per 45 grams of carbohydrates.    Post-pancreatectomy diabetes (H)       insulin glargine 100 UNIT/ML injection    LANTUS    3 mL    Inject 5 units subcutaneously every morning.    Post-pancreatectomy diabetes (H)       insulin pen needle 32G X 4 MM    BD KEN U/F    100 each    Use 6-8 times per day    Acquired total absence of pancreas       LANsoprazole 30 MG CR capsule    PREVACID    30 capsule    Take 1 capsule (30 mg) by mouth daily Take 30-60 minutes before a meal.    Post-pancreatectomy diabetes (H), Pancreatic insufficiency       levothyroxine 125 MCG tablet    SYNTHROID/LEVOTHROID    1 tablet    Take 1 tablet (125 mcg), by mouth daily before  breakfast.    Itching, Post-pancreatectomy diabetes (H), History of pancreatectomy, Pancreatic insufficiency       LORazepam 0.5 MG tablet    ATIVAN    20 tablet    Take 1 tablet (0.5 mg) by mouth every 6 hours as needed for anxiety    History of pancreatectomy, Anxiety       Lubiprostone 8 MCG Caps capsule     90 capsule    Take 1 capsule (8 mcg) by mouth 2 times daily. Take 1 capsule by mouth po BID x 5 days. If no effect, increase to 2 capsules po BID x 5 days. If no effect, increase to 3 capsules po BID and continue until further instruction.    Chronic constipation       metroNIDAZOLE 500 MG tablet    FLAGYL    15 tablet    Take 0.5 tablets (250 mg) by mouth 3 times daily    Bacterial overgrowth syndrome       multivitamin CF formula capsule    CHOICEFUL     Take 1 tablet by mouth daily    Itching, Post-pancreatectomy diabetes (H), History of pancreatectomy, Pancreatic insufficiency       oxyCODONE 5 MG IR tablet    ROXICODONE    150 tablet    Take 1-2 tablets (5-10 mg) by mouth every 4 hours as needed for moderate to severe pain    Acute post-operative pain       polyethylene glycol Packet    MIRALAX/GLYCOLAX    7 packet    Take 17 g by mouth daily as needed for constipation    Itching, Post-pancreatectomy diabetes (H), History of pancreatectomy, Pancreatic insufficiency       pregabalin 25 MG capsule    LYRICA    180 capsule    Take 3 capsules (75 mg) by mouth 2 times daily    Neuropathic pain, History of pancreatectomy, Chronic abdominal pain       prochlorperazine 5 MG tablet    COMPAZINE    90 tablet    Take two 5 mg tablets by mouth every six hours as needed for nausea.    Itching, Post-pancreatectomy diabetes (H), History of pancreatectomy, Pancreatic insufficiency       senna-docusate 8.6-50 MG per tablet    SENOKOT-S;PERICOLACE    100 tablet    Take 1 tablet by mouth 2 times daily as needed for constipation    Itching, Post-pancreatectomy diabetes (H), History of pancreatectomy, Pancreatic  insufficiency       * Notice:  This list has 4 medication(s) that are the same as other medications prescribed for you. Read the directions carefully, and ask your doctor or other care provider to review them with you.

## 2017-06-26 NOTE — TELEPHONE ENCOUNTER
Rituximab infusion plan cued up.  Routed to Dr. Becerra for review and signature.    Radha Morales RN  Rheumatology Clinic

## 2017-07-04 ENCOUNTER — TELEPHONE (OUTPATIENT)
Dept: TRANSPLANT | Facility: CLINIC | Age: 52
End: 2017-07-04

## 2017-07-04 NOTE — TELEPHONE ENCOUNTER
Call yesterday from Dinora. She states she did very well on her course of Flagly . She finished it 2-3 days ago and her sx have returned with increased diarrhea. 6-8 times a day-pale stools.  She would like Dr Laguerre thoughts,

## 2017-07-07 ENCOUNTER — DOCUMENTATION ONLY (OUTPATIENT)
Dept: TRANSPLANT | Facility: CLINIC | Age: 52
End: 2017-07-07

## 2017-07-07 NOTE — PROGRESS NOTES
Dr Phoenix's Epic message today Please repeat LFT's and remind me if she got her Rituximab and what her itching symtpoms are doing.     If persistently itching or increasing LFTs, will rec gettting MRCP (MRI last done in spring), but now need MRCP to eval for any duct obstruction....   Can try ursodiol in the interim - I believe dose is 300 BID      Just spoke to Dinora Franks    She has NOT yet had her rituximab, waiting for insurance approval    She continues to have head & upper body itching   Increased generalized abdominal pain  2 days ago had 17 small, pale , loose . floating stools.  Dr Richardson is aware.  [place] [facility] [place]do you want labs before then?    I will schedule MRCP if further elevated    Ursodiol 300 -BID-I will call in.

## 2017-07-08 ENCOUNTER — TELEPHONE (OUTPATIENT)
Dept: TRANSPLANT | Facility: CLINIC | Age: 52
End: 2017-07-08

## 2017-07-08 ENCOUNTER — DOCUMENTATION ONLY (OUTPATIENT)
Dept: TRANSPLANT | Facility: CLINIC | Age: 52
End: 2017-07-08

## 2017-07-08 DIAGNOSIS — R79.89 ELEVATED LFTS: ICD-10-CM

## 2017-07-08 DIAGNOSIS — R79.89 ELEVATED LFTS: Primary | ICD-10-CM

## 2017-07-08 DIAGNOSIS — Z53.9 ERRONEOUS ENCOUNTER--DISREGARD: Primary | ICD-10-CM

## 2017-07-08 NOTE — TELEPHONE ENCOUNTER
Called Dinora  Plan per Dr Phoenix's emails to get LFT's locally on Monday, will fax orders to her PCP per Dinora's request    MRCP Thursday to evaluate for possible stricture/obstruction in biliary tree,  Port access prior to procedure.    Awaiting response from Dr Phoenix re Actigallrx as it appears there is a cross reaction/relation with pipercillin which she is allergic too.

## 2017-07-08 NOTE — PROGRESS NOTES
E-mail from Dr Phoenix.      yes, let's get labs and MRCP prior to Ritux, even if we have to postop.  Need to make sure there's not a stricture in progress due to the pale stools and recent sharp uptick in LFTs

## 2017-07-10 RX ORDER — URSODIOL 300 MG/1
300 CAPSULE ORAL 2 TIMES DAILY
Qty: 60 CAPSULE | Refills: 0 | Status: SHIPPED | OUTPATIENT
Start: 2017-07-10 | End: 2017-08-09

## 2017-07-11 ASSESSMENT — ACTIVITIES OF DAILY LIVING (ADL): I_KEEP_THINKING_ABOUT_HOW_BADLY_I_WANT_THE_PAIN_TO_STOP: 0 = NOT AT ALL

## 2017-07-12 ENCOUNTER — DOCUMENTATION ONLY (OUTPATIENT)
Dept: TRANSPLANT | Facility: CLINIC | Age: 52
End: 2017-07-12

## 2017-07-12 NOTE — PROGRESS NOTES
Medication  Name Dosage Unit Notes Date/Time Tags   Lantus 3.0 [id number]  Jul 8, 2017 9:30:54 AM    Novalog 2.0 [id number]  Jul 7, 2017 10:56:24 PM    Novalog 1.0 [id number]  Jul 7, 2017 12:53:48 PM    Lantus 3.0 [id number]  Jul 7, 2017 8:55:38 AM    Lantus 3.0 [id number] Jul 6, 2017 9:20:00 AM    Novalog 1.0 [id number] Jul 5, 2017 10:50:00 PM    Lantus 3.0 [id number]  Jul 5, 2017 7:50:00 AM    Lantus 3.0 [id number]  Jul 4, 2017 7:50:00 AM    Lantus 3.0 [id number]  Jul 3, 2017 9:39:38 AM    Weight  Nothing.  Blood Sugar  Concentration Event Notes Date/Time Tags   106 mg/dL BeforeSleep  Jul 9, 2017 1:07:55 AM    102 mg/dL BeforeDinner Jul 8, 2017 6:00:00 PM    108 mg/dL BeforeLunch  Jul 8, 2017 1:59:26 PM    107 mg/dL BeforeBreakfast  Jul 8, 2017 9:46:00 AM    166 mg/dL BeforeSleep corrected Jul 7, 2017 10:56:08 PM    86 mg/dL BeforeDinner Jul 7, 2017 5:48:00 PM    149 mg/dL AfterLunch 1.5 hour after lunch Jul 7, 2017 12:53:54 PM    112 mg/dL BeforeBreakfast  Jul 7, 2017 8:55:30 AM    112 mg/dL Fasting itchy & urq pain woke me up Jul 7, 2017 4:18:00 AM    97 mg/dL BeforeSleep  Jul 7, 2017 12:11:00 AM    126 mg/dL BeforeDinner Jul 6, 2017 8:23:00 PM    128 mg/dL Other  Jul 6, 2017 6:39:00 PM    87 mg/dL Other before exercize Jul 6, 2017 3:35:00 PM    124 mg/dL BeforeLunch  Jul 6, 2017 11:49:00 AM    118 mg/dL BeforeBreakfast  Jul 6, 2017 9:18:00 AM    131 mg/dL BeforeSleep corrected Jul 5, 2017 10:42:00 PM    93 mg/dL BeforeDinner Jul 5, 2017 5:08:00 PM    110 mg/dL AfterLunch  Jul 5, 2017 12:41:00 PM    116 mg/dL BeforeBreakfast  Jul 5, 2017 7:44:00 AM    96 mg/dL Other up in lots of pain Jul 5, 2017 2:35:00 AM    111 mg/dL BeforeSleep  Jul 4, 2017 10:51:00 PM    115 mg/dL BeforeDinner  Jul 4, 2017 5:24:00 PM    126 mg/dL BeforeLunch  Jul 4, 2017 11:56:00 AM    116 mg/dL BeforeBreakfast  Jul 4, 2017 7:48:00 AM    90 mg/dL BeforeSleep  Jul 3, 2017 11:05:39 PM    139 mg/dL BeforeSleep  Jul 3, 2017 9:39:00 PM     99 mg/dL Other  Jul 3, 2017 7:02:15 PM    49 mg/dL BeforeDinner  Jul 3, 2017 6:45:00 PM    99 mg/dL BeforeLunch  Jul 3, 2017 12:43:00 PM    98 mg/dL BeforeBreakfast  Jul 3, 2017 9:39:28 AM

## 2017-07-12 NOTE — PROGRESS NOTES
Blood Sugar  Concentration Event Notes Date/Time Tags   106 mg/dL BeforeBreakfast  Jul 2, 2017 9:15:00 AM    114 mg/dL BeforeSleep  Jul 1, 2017 11:02:00 PM    109 mg/dL BeforeDinner Jul 1, 2017 5:52:00 PM    140 mg/dL Other 3 hours post brun Jul 1, 2017 3:03:00 PM    96 mg/dL BeforeBreakfast  Jul 1, 2017 8:54:00 AM    112 mg/dL BeforeSleep  Jun 30, 2017 11:12:00 PM    106 mg/dL AfterDinner Jun 30, 2017 7:19:58 PM    103 mg/dL BeforeLunch  Jun 30, 2017 12:00:00 PM    108 mg/dL AfterSleep  Jun 30, 2017 9:06:00 AM    102 mg/dL BeforeSleep  Jun 29, 2017 11:39:27 PM    127 mg/dL BeforeDinner Jun 29, 2017 7:17:46 PM    121 mg/dL BeforeLunch Jun 29, 2017 12:33:00 PM    97 mg/dL AfterSleep  Jun 29, 2017 8:45:00 AM    133 mg/dL BeforeSleep 1U Novalog  Jun 28, 2017 9:50:00 PM    105 mg/dL BeforeDinner Jun 28, 2017 6:26:00 PM    92 mg/dL BeforeLunch Jun 28, 2017 1:10:00 PM    116 mg/dL AfterSleep  Jun 28, 2017 8:25:00 AM    124 mg/dL BeforeSleep  Jun 27, 2017 9:33:00 PM    108 mg/dL BeforeDinner Jun 27, 2017 6:54:00 PM    110 mg/dL Other  Jun 27, 2017 3:45:33 PM    125 mg/dL BeforeLunch Jun 27, 2017 1:41:00 PM    117 mg/dL BeforeBreakfast  Jun 27, 2017 7:46:00 AM    107 mg/dL Other  Jun 27, 2017 12:53:00 AM    120 mg/dL BeforeSleep  Jun 26, 2017 10:24:00 PM    107 mg/dL BeforeDinner Jun 26, 2017 5:39:00 PM    121 mg/dL BeforeLunch Jun 26, 2017 11:39:00 AM    Medication  Name Dosage Unit Notes Date/Time Tags   Lantus 3.0 [id number]  Jul 2, 2017 9:20:00 AM    Novalog 1.0 [id number]  Jul 1, 2017 3:05:00 PM    Lantus 3.0 [id number]  Jul 1, 2017 9:00:44 AM    Lantus 3.0 [id number]  Jun 30, 2017 9:10:00 AM    Lantus 3.0 [id number]  Jun 29, 2017 8:50:00 AM    Novalog 1.0 [id number]  Jun 28, 2017 9:53:00 PM    Lantus 3.0 [id number]  Jun 27, 2017 7:49:14 AM    Weight  Weight Notes Date/Time Tags   148 lb  Jun 27, 2017 9:05:56 PM

## 2017-07-12 NOTE — PROGRESS NOTES
Weight  Weight Notes Date/Time Tags   146 lb  Jun 23, 2017 8:12:29 AM    Medication  Name Dosage Unit Notes Date/Time Tags   Lantus 3.0 [id number]  Jun 24, 2017 10:50:00 AM    Lantus 3.0 [id number]  Jun 23, 2017 8:16:47 AM    Lantus 3.0 [id number] Jun 22, 2017 8:30:00 AM    Lantus 3.0 [id number]  Jun 21, 2017 8:00:44 AM    Lantus 3.0 [id number] Jun 20, 2017 9:45:00 AM    Novalog 2.0 [id number]  Jun 19, 2017 9:13:19 PM    Lantus 4.0 [id number]  Jun 19, 2017 8:13:14 AM    Blood Sugar  Concentration Event Notes Date/Time Tags   127 mg/dL BeforeSleep  Jun 24, 2017 10:47:00 PM    119 mg/dL BeforeDinner  Jun 24, 2017 5:05:00 PM    121 mg/dL BeforeLunch Jun 24, 2017 1:51:00 PM    96 mg/dL BeforeBreakfast  Jun 24, 2017 10:48:00 AM    101 mg/dL Other Awake with pain & headache  Jun 24, 2017 4:00:00 AM    118 mg/dL BeforeSleep  Jun 23, 2017 9:51:00 PM    101 mg/dL BeforeDinner  Jun 23, 2017 4:49:00 PM    111 mg/dL BeforeLunch Jun 23, 2017 12:19:00 PM    123 mg/dL BeforeBreakfast  Jun 23, 2017 8:14:09 AM    120 mg/dL BeforeSleep  Jun 22, 2017 9:09:00 PM    95 mg/dL BeforeDinner  Jun 22, 2017 5:35:00 PM    88 mg/dL BeforeLunch Jun 22, 2017 12:49:48 PM    94 mg/dL AfterSleep  Jun 22, 2017 7:57:00 AM    132 mg/dL AfterDinner Going to bed earlyð Jun 21, 2017 7:45:00 PM    101 mg/dL BeforeDinner  Jun 21, 2017 5:23:00 PM    110 mg/dL BeforeLunch  Jun 21, 2017 12:21:00 PM    111 mg/dL BeforeBreakfast  Jun 21, 2017 8:00:33 AM    122 mg/dL BeforeSleep  Jun 20, 2017 10:39:00 PM    168 mg/dL AfterDinner 1U Novalog  Jun 20, 2017 7:26:00 PM    88 mg/dL Other  Jun 20, 2017 3:31:00 PM    99 mg/dL Fasting  Jun 20, 2017 11:55:00 AM    128 mg/dL Other  Jun 20, 2017 10:01:00 AM    98 mg/dL AfterSleep  Jun 20, 2017 6:03:00 AM    188 mg/dL Other 2u novalog  Jun 19, 2017 9:07:00 PM    183 mg/dL BeforeSleep Dinner late? Hot shower? Long day? Painful shoulder? What the heck? Jun 19, 2017 9:06:00 PM    98 mg/dL BeforeDinner  Jun 19, 2017  4:46:00 PM    115 mg/dL BeforeLunch  Jun 19, 2017 10:55:00 AM    131 mg/dL Other  Jun 19, 2017 10:01:00 AM

## 2017-07-13 ENCOUNTER — OFFICE VISIT (OUTPATIENT)
Dept: ANESTHESIOLOGY | Facility: CLINIC | Age: 52
End: 2017-07-13

## 2017-07-13 ENCOUNTER — HOSPITAL ENCOUNTER (OUTPATIENT)
Dept: MRI IMAGING | Facility: CLINIC | Age: 52
Discharge: HOME | End: 2017-07-13
Attending: SURGERY | Admitting: SURGERY
Payer: COMMERCIAL

## 2017-07-13 ENCOUNTER — OFFICE VISIT (OUTPATIENT)
Dept: LAB | Facility: CLINIC | Age: 52
End: 2017-07-13
Attending: SURGERY
Payer: COMMERCIAL

## 2017-07-13 DIAGNOSIS — F43.89 ADJUSTMENT REACTION TO CHRONIC STRESS: Primary | ICD-10-CM

## 2017-07-13 DIAGNOSIS — R74.8 ELEVATED LIVER ENZYMES: Primary | ICD-10-CM

## 2017-07-13 DIAGNOSIS — R79.89 ELEVATED LFTS: ICD-10-CM

## 2017-07-13 PROCEDURE — A9585 GADOBUTROL INJECTION: HCPCS | Performed by: SURGERY

## 2017-07-13 PROCEDURE — 25000128 H RX IP 250 OP 636: Performed by: SURGERY

## 2017-07-13 PROCEDURE — 74183 MRI ABD W/O CNTR FLWD CNTR: CPT

## 2017-07-13 RX ORDER — HEPARIN SODIUM (PORCINE) LOCK FLUSH IV SOLN 100 UNIT/ML 100 UNIT/ML
5 SOLUTION INTRAVENOUS
Status: COMPLETED | OUTPATIENT
Start: 2017-07-13 | End: 2017-07-13

## 2017-07-13 RX ORDER — GADOBUTROL 604.72 MG/ML
7.5 INJECTION INTRAVENOUS ONCE
Status: COMPLETED | OUTPATIENT
Start: 2017-07-13 | End: 2017-07-13

## 2017-07-13 RX ORDER — HEPARIN SODIUM (PORCINE) LOCK FLUSH IV SOLN 100 UNIT/ML 100 UNIT/ML
5 SOLUTION INTRAVENOUS ONCE
Status: COMPLETED | OUTPATIENT
Start: 2017-07-13 | End: 2017-07-13

## 2017-07-13 RX ADMIN — SODIUM CHLORIDE, PRESERVATIVE FREE 5 ML: 5 INJECTION INTRAVENOUS at 09:53

## 2017-07-13 RX ADMIN — SODIUM CHLORIDE, PRESERVATIVE FREE 5 ML: 5 INJECTION INTRAVENOUS at 07:59

## 2017-07-13 RX ADMIN — SODIUM CHLORIDE 50 ML: 9 INJECTION, SOLUTION INTRAVENOUS at 09:02

## 2017-07-13 RX ADMIN — GADOBUTROL 7.5 ML: 604.72 INJECTION INTRAVENOUS at 08:58

## 2017-07-13 NOTE — MR AVS SNAPSHOT
After Visit Summary   7/13/2017    Dinora Mcghee    MRN: [id number]           Patient Information     Date Of Birth          1965        Visit Information        Provider Department      7/13/2017 1:00 PM Maria Elena Abdalla, PhD [facility]        Today's Diagnoses     Adjustment reaction to chronic stress    -  1       Follow-ups after your visit        Follow-up notes from your care team     Return in about 2 weeks (around 7/27/2017).      Your next 10 appointments already scheduled     Jul 28, 2017  2:00 PM CDT   (Arrive by 1:45 PM)   Return Visit with Maria Elena Abdalla, PhD   [facility] ([facility])    [address] [place] [address]  [address] [facility] [address] [phone]            Aug 04, 2017  9:00 AM CDT   (Arrive by 8:45 AM)   Return Visit with Jesse Richardson DO   [facility] ([facility])    [address] [place] [address]  [address] [facility] [id number] [phone]            Aug 08, 2017  2:20 PM CDT   (Arrive by 2:05 PM)   Return Visit with Martinez Arteaga MD   [facility] ([facility])    [address] [place] [address]  [address] [facility] [id number] [phone]            Aug 14, 2017  7:45 AM CDT   Lab with  LAB   [facility] Lab ([facility])    [address] [place] [address]  [address] [facility] [address] [phone]            Aug 14, 2017  8:50 AM CDT   (Arrive by 8:35 AM)   Return General Liver with Ara Lugo MD   [facility] Hepatology ([facility])    [address] [place] [address]  [address] [facility] [id number] [phone]              Who to contact     Please call your clinic at [phone] to:    Ask questions about your health    Make or cancel appointments    Discuss your medicines    Learn  about your test results    Speak to your doctor   If you have compliments or concerns about an experience at your clinic, or if you wish to file a complaint, please contact [facility] Patient Relations at [phone] or email us at [email or web address]         Additional Information About Your Visit        [employer] Information     [employer] gives you secure access to your electronic health record. If you see a primary care provider, you can also send messages to your care team and make appointments. If you have questions, please call your primary care clinic.  If you do not have a primary care provider, please call [phone] and they will assist you.      [employer] is an electronic gateway that provides easy, online access to your medical records. With [employer], you can request a clinic appointment, read your test results, renew a prescription or communicate with your care team.     To access your existing account, please contact your [facility] or call [phone] for assistance.        Care EveryWhere ID     This is your Care EveryWhere ID. This could be used by other organizations to access your [facility] medical records  [id number]         Blood Pressure from Last 3 Encounters:   06/20/17 102/71   06/19/17 120/76   05/20/17 110/68    Weight from Last 3 Encounters:   06/20/17 67.3 kg (148 lb 5.9 oz)   06/20/17 67.3 kg (148 lb 4.8 oz)   06/19/17 69.2 kg (152 lb 8 oz)              Today, you had the following     No orders found for display       Primary Care Provider Office Phone # Fax #    Justyna Lawson [phone]       [facility] [address]  [place] [address]        Equal Access to Services     NIGEL HUITRON : Hadii monty Helton, luci suarez, ivan marina. So [facility] [phone].    ATENCIÓN: Si habla español, tiene a muñoz disposición  servicios gratuitos de asistencia lingüística. Howard yanez [phone].    We comply with applicable federal civil rights laws and [place] laws. We do not discriminate on the basis of race, color, national origin, age, disability sex, sexual orientation or gender identity.            Thank you!     Thank you for choosing [facility]  for your care. Our goal is always to provide you with excellent care. Hearing back from our patients is one way we can continue to improve our services. Please take a few minutes to complete the written survey that you may receive in the mail after your visit with us. Thank you!             Your Updated Medication List - Protect others around you: Learn how to safely use, store and throw away your medicines at www.disposemymeds.org.          This list is accurate as of: 7/13/17  2:18 PM.  Always use your most recent med list.                   Brand Name Dispense Instructions for use Diagnosis    amitriptyline 10 MG tablet    ELAVIL    60 tablet    Take 2 tablets (20 mg) by mouth At Bedtime    Itching, Post-pancreatectomy diabetes (H), History of pancreatectomy, Pancreatic insufficiency       amylase-lipase-protease 79341 UNITS Cpep per EC capsule    CREON 24    450 capsule    Take 3-4 capsules by mouth with meals and 1-2 with snacks. Maximum 15 capsules per day.    Acquired total absence of pancreas       aspirin 81 MG EC tablet     90 tablet    Take 1 tablet (81 mg) by mouth daily    High platelet count (H)       BD SHARPS  Misc     1 each    1 Container as needed    Post-pancreatectomy diabetes (H)       blood glucose monitoring lancets     1 Box    Use to test blood sugar 6-8 times daily or as directed.    Acute on chronic pancreatitis (H)       blood glucose monitoring test strip    PAT CONTOUR NEXT    200 strip    Use to test blood sugar 6-8 times daily or as directed.    Acute on chronic pancreatitis (H), Post-pancreatectomy  diabetes (H)       * BOOST HIGH PROTEIN Liqd      After above baseline labs are drawn, give: 6 mL/kg to maximum of 360 mL; the beverage is to be consumed within 5 minutes.    Post-pancreatectomy diabetes (H), Pancreatic insufficiency, History of pancreatectomy       * BOOST HIGH PROTEIN Liqd      After above baseline labs are drawn, give: 6 mL/kg to maximum of 360 mL; the beverage is to be consumed within 5 minutes.    Post-pancreatectomy diabetes (H), Pancreatic insufficiency       celecoxib 100 MG capsule    celeBREX    60 capsule    Take 1 capsule (100 mg) by mouth 2 times daily    Acute post-operative pain       cetirizine 10 MG tablet    zyrTEC     Take 10 mg by mouth daily    Elevated liver enzymes       CONTOUR NEXT EZ MONITOR W/DEVICE Kit      1 Device 6 times daily    Itching, Post-pancreatectomy diabetes (H), History of pancreatectomy, Pancreatic insufficiency       cyclobenzaprine 5 MG tablet    FLEXERIL    42 tablet    Take 5-10 mg by mouth three times per day as needed for muscle spasms.    Muscle spasm       diphenhydrAMINE 25 MG capsule    BENADRYL ALLERGY    56 capsule    Take 1 capsule (25 mg) by mouth every 6 hours as needed for itching or allergies    Itching, Post-pancreatectomy diabetes (H), History of pancreatectomy, Pancreatic insufficiency       docusate sodium 100 MG capsule    COLACE    60 capsule    Take 1 capsule (100 mg) by mouth 2 times daily as needed for constipation,    Itching, Post-pancreatectomy diabetes (H), History of pancreatectomy, Pancreatic insufficiency       glucagon 1 MG kit    GLUCAGON EMERGENCY    1 mg    Inject 1 mg into the muscle once for 1 dose    Post-pancreatectomy diabetes (H)       glucose 40 % Gel gel     3 Tube    Take 15-30 g by mouth every 15 minutes as needed for low blood sugar    Acquired total absence of pancreas       Injection Device for insulin Tika    NOVOPEN ECHO    1 each    1 each daily as needed    Post-pancreatectomy diabetes (H)       * insulin  aspart 100 UNIT/ML injection    NovoLOG PEN     aspart medium correction 1 unit per 50 > 130 every 4 hours ? For Pre-Meal  - 179 give 1 unit.  For Pre-Meal  - 230 give 2 units.  For Pre-Meal  - 281 give 3 units.  For Pre-Meal  - 332 give 4 units.    Acquired total absence of pancreas       * insulin aspart 100 UNIT/ML injection    NovoLOG PENFILL    3 mL    Administer subcutaneously 0.5 unit per 45 grams of carbohydrates.    Post-pancreatectomy diabetes (H)       insulin glargine 100 UNIT/ML injection    LANTUS    3 mL    Inject 5 units subcutaneously every morning.    Post-pancreatectomy diabetes (H)       insulin pen needle 32G X 4 MM    BD KEN U/F    100 each    Use 6-8 times per day    Acquired total absence of pancreas       LANsoprazole 30 MG CR capsule    PREVACID    30 capsule    Take 1 capsule (30 mg) by mouth daily Take 30-60 minutes before a meal.    Post-pancreatectomy diabetes (H), Pancreatic insufficiency       levothyroxine 125 MCG tablet    SYNTHROID/LEVOTHROID    1 tablet    Take 1 tablet (125 mcg), by mouth daily before breakfast.    Itching, Post-pancreatectomy diabetes (H), History of pancreatectomy, Pancreatic insufficiency       LORazepam 0.5 MG tablet    ATIVAN    20 tablet    Take 1 tablet (0.5 mg) by mouth every 6 hours as needed for anxiety    History of pancreatectomy, Anxiety       Lubiprostone 8 MCG Caps capsule     90 capsule    Take 1 capsule (8 mcg) by mouth 2 times daily. Take 1 capsule by mouth po BID x 5 days. If no effect, increase to 2 capsules po BID x 5 days. If no effect, increase to 3 capsules po BID and continue until further instruction.    Chronic constipation       metroNIDAZOLE 500 MG tablet    FLAGYL    15 tablet    Take 0.5 tablets (250 mg) by mouth 3 times daily    Bacterial overgrowth syndrome       multivitamin CF formula capsule    CHOICEFUL     Take 1 tablet by mouth daily    Itching, Post-pancreatectomy diabetes (H), History of  pancreatectomy, Pancreatic insufficiency       oxyCODONE 5 MG IR tablet    ROXICODONE    150 tablet    Take 1-2 tablets (5-10 mg) by mouth every 4 hours as needed for moderate to severe pain    Acute post-operative pain       polyethylene glycol Packet    MIRALAX/GLYCOLAX    7 packet    Take 17 g by mouth daily as needed for constipation    Itching, Post-pancreatectomy diabetes (H), History of pancreatectomy, Pancreatic insufficiency       pregabalin 25 MG capsule    LYRICA    180 capsule    Take 3 capsules (75 mg) by mouth 2 times daily    Neuropathic pain, History of pancreatectomy, Chronic abdominal pain       prochlorperazine 5 MG tablet    COMPAZINE    90 tablet    Take two 5 mg tablets by mouth every six hours as needed for nausea.    Itching, Post-pancreatectomy diabetes (H), History of pancreatectomy, Pancreatic insufficiency       senna-docusate 8.6-50 MG per tablet    SENOKOT-S;PERICOLACE    100 tablet    Take 1 tablet by mouth 2 times daily as needed for constipation    Itching, Post-pancreatectomy diabetes (H), History of pancreatectomy, Pancreatic insufficiency       ursodiol 300 MG capsule    ACTIGALL    60 capsule    Take 1 capsule (300 mg) by mouth 2 times daily    Elevated LFTs       * Notice:  This list has 4 medication(s) that are the same as other medications prescribed for you. Read the directions carefully, and ask your doctor or other care provider to review them with you.

## 2017-07-13 NOTE — MR AVS SNAPSHOT
After Visit Summary   7/13/2017    Dinora Mcghee    MRN: [id number]           Patient Information     Date Of Birth          1965        Visit Information        Provider Department      7/13/2017 8:00 AM  [facility]        Today's Diagnoses     Elevated liver enzymes    -  1       Follow-ups after your visit        Your next 10 appointments already scheduled     Aug 08, 2017  2:20 PM CDT   (Arrive by 2:05 PM)   Return Visit with Martinez Arteaga MD   [facility] ([facility])    [address] [place] [address]  [address] [facility] [id number] [phone]            Aug 14, 2017  7:45 AM CDT   Lab with  LAB   [facility] Lab ([facility])    [address] [place] [address]  1st [facility] [id number] [phone]            Aug 14, 2017  8:50 AM CDT   (Arrive by 8:35 AM)   Return General Liver with Ara Lugo MD   [facility] Hepatology ([facility])    [address] [place] [address]  3rd [facility] [id number] [phone]              Who to contact     If you have questions or need follow up information about today's clinic visit or your schedule please contact [facility] directly at [phone].  Normal or non-critical lab and imaging results will be communicated to you by MyChart, letter or phone within 4 business days after the clinic has received the results. If you do not hear from us within 7 days, please contact the clinic through MyChart or phone. If you have a critical or abnormal lab result, we will notify you by phone as soon as possible.  Submit refill requests through [employer] or call your pharmacy and they will forward the refill request to us. Please allow 3 business days for your refill to be completed.          Additional Information About Your Visit        [employer] Information     [employer] [employer] gives you secure  access to your electronic health record. If you see a primary care provider, you can also send messages to your care team and make appointments. If you have questions, please call your primary care clinic.  If you do not have a primary care provider, please call [phone] and they will assist you.        Care EveryWhere ID     This is your Care EveryWhere ID. This could be used by other organizations to access your [facility] medical records  [id number]         Blood Pressure from Last 3 Encounters:   08/04/17 103/69   06/20/17 102/71   06/19/17 120/76    Weight from Last 3 Encounters:   08/04/17 67.2 kg (148 lb 3.2 oz)   06/20/17 67.3 kg (148 lb 5.9 oz)   06/20/17 67.3 kg (148 lb 4.8 oz)              Today, you had the following     No orders found for display       Primary Care Provider Office Phone # Fax #    Justyna Lawson [phone]       [facility] [address] [address]  [place] [address]        Equal Access to Services     : Hadii aad ku hadasho Soomaali, waaxda luqadaha, qaybta kaalmada adeegyada, waxdorian wallace . So [facility] [phone].    ATENCIÓN: Si habla español, tiene a muñoz disposición servicios gratuitos de asistencia lingüística. Llame al [phone].    We comply with applicable federal civil rights laws and [place] laws. We do not discriminate on the basis of race, color, national origin, age, disability sex, sexual orientation or gender identity.            Thank you!     Thank you for choosing [facility]  for your care. Our goal is always to provide you with excellent care. Hearing back from our patients is one way we can continue to improve our services. Please take a few minutes to complete the written survey that you may receive in the mail after your visit with us. Thank you!             Your Updated Medication List - Protect others around you: Learn how to safely use, store and throw away your medicines at  www.disposemymeds.org.          This list is accurate as of: 7/13/17 11:59 PM.  Always use your most recent med list.                   Brand Name Dispense Instructions for use Diagnosis    amitriptyline 10 MG tablet    ELAVIL    60 tablet    Take 2 tablets (20 mg) by mouth At Bedtime    Itching, Post-pancreatectomy diabetes (H), History of pancreatectomy, Pancreatic insufficiency       amylase-lipase-protease 65683 UNITS Cpep per EC capsule    CREON 24    450 capsule    Take 3-4 capsules by mouth with meals and 1-2 with snacks. Maximum 15 capsules per day.    Acquired total absence of pancreas       aspirin 81 MG EC tablet     90 tablet    Take 1 tablet (81 mg) by mouth daily    High platelet count (H)       BD SHARPS  Misc     1 each    1 Container as needed    Post-pancreatectomy diabetes (H)       blood glucose monitoring lancets     1 Box    Use to test blood sugar 6-8 times daily or as directed.    Acute on chronic pancreatitis (H)       blood glucose monitoring test strip    PAT CONTOUR NEXT    200 strip    Use to test blood sugar 6-8 times daily or as directed.    Acute on chronic pancreatitis (H), Post-pancreatectomy diabetes (H)       * BOOST HIGH PROTEIN Liqd      After above baseline labs are drawn, give: 6 mL/kg to maximum of 360 mL; the beverage is to be consumed within 5 minutes.    Post-pancreatectomy diabetes (H), Pancreatic insufficiency, History of pancreatectomy       * BOOST HIGH PROTEIN Liqd      After above baseline labs are drawn, give: 6 mL/kg to maximum of 360 mL; the beverage is to be consumed within 5 minutes.    Post-pancreatectomy diabetes (H), Pancreatic insufficiency       celecoxib 100 MG capsule    celeBREX    60 capsule    Take 1 capsule (100 mg) by mouth 2 times daily    Acute post-operative pain       cetirizine 10 MG tablet    zyrTEC     Take 10 mg by mouth daily    Elevated liver enzymes       CONTOUR NEXT EZ MONITOR W/DEVICE Kit      1 Device 6 times daily     Itching, Post-pancreatectomy diabetes (H), History of pancreatectomy, Pancreatic insufficiency       cyclobenzaprine 5 MG tablet    FLEXERIL    42 tablet    Take 5-10 mg by mouth three times per day as needed for muscle spasms.    Muscle spasm       diphenhydrAMINE 25 MG capsule    BENADRYL ALLERGY    56 capsule    Take 1 capsule (25 mg) by mouth every 6 hours as needed for itching or allergies    Itching, Post-pancreatectomy diabetes (H), History of pancreatectomy, Pancreatic insufficiency       docusate sodium 100 MG capsule    COLACE    60 capsule    Take 1 capsule (100 mg) by mouth 2 times daily as needed for constipation,    Itching, Post-pancreatectomy diabetes (H), History of pancreatectomy, Pancreatic insufficiency       glucagon 1 MG kit    GLUCAGON EMERGENCY    1 mg    Inject 1 mg into the muscle once for 1 dose    Post-pancreatectomy diabetes (H)       glucose 40 % Gel gel     3 Tube    Take 15-30 g by mouth every 15 minutes as needed for low blood sugar    Acquired total absence of pancreas       Injection Device for insulin Tika    NOVOPEN ECHO    1 each    1 each daily as needed    Post-pancreatectomy diabetes (H)       * insulin aspart 100 UNIT/ML injection    NovoLOG PEN     aspart medium correction 1 unit per 50 > 130 every 4 hours ? For Pre-Meal  - 179 give 1 unit.  For Pre-Meal  - 230 give 2 units.  For Pre-Meal  - 281 give 3 units.  For Pre-Meal  - 332 give 4 units.    Acquired total absence of pancreas       * insulin aspart 100 UNIT/ML injection    NovoLOG PENFILL    3 mL    Administer subcutaneously 0.5 unit per 45 grams of carbohydrates.    Post-pancreatectomy diabetes (H)       insulin glargine 100 UNIT/ML injection    LANTUS    3 mL    Inject 5 units subcutaneously every morning.    Post-pancreatectomy diabetes (H)       insulin pen needle 32G X 4 MM    BD KEN U/F    100 each    Use 6-8 times per day    Acquired total absence of pancreas       LANsoprazole 30 MG CR  capsule    PREVACID    30 capsule    Take 1 capsule (30 mg) by mouth daily Take 30-60 minutes before a meal.    Post-pancreatectomy diabetes (H), Pancreatic insufficiency       levothyroxine 125 MCG tablet    SYNTHROID/LEVOTHROID    1 tablet    Take 1 tablet (125 mcg), by mouth daily before breakfast.    Itching, Post-pancreatectomy diabetes (H), History of pancreatectomy, Pancreatic insufficiency       LORazepam 0.5 MG tablet    ATIVAN    20 tablet    Take 1 tablet (0.5 mg) by mouth every 6 hours as needed for anxiety    History of pancreatectomy, Anxiety       Lubiprostone 8 MCG Caps capsule     90 capsule    Take 1 capsule (8 mcg) by mouth 2 times daily. Take 1 capsule by mouth po BID x 5 days. If no effect, increase to 2 capsules po BID x 5 days. If no effect, increase to 3 capsules po BID and continue until further instruction.    Chronic constipation       metroNIDAZOLE 500 MG tablet    FLAGYL    15 tablet    Take 0.5 tablets (250 mg) by mouth 3 times daily    Bacterial overgrowth syndrome       multivitamin CF formula capsule    CHOICEFUL     Take 1 tablet by mouth daily    Itching, Post-pancreatectomy diabetes (H), History of pancreatectomy, Pancreatic insufficiency       oxyCODONE 5 MG IR tablet    ROXICODONE    150 tablet    Take 1-2 tablets (5-10 mg) by mouth every 4 hours as needed for moderate to severe pain    Acute post-operative pain       polyethylene glycol Packet    MIRALAX/GLYCOLAX    7 packet    Take 17 g by mouth daily as needed for constipation    Itching, Post-pancreatectomy diabetes (H), History of pancreatectomy, Pancreatic insufficiency       pregabalin 25 MG capsule    LYRICA    180 capsule    Take 3 capsules (75 mg) by mouth 2 times daily    Neuropathic pain, History of pancreatectomy, Chronic abdominal pain       prochlorperazine 5 MG tablet    COMPAZINE    90 tablet    Take two 5 mg tablets by mouth every six hours as needed for nausea.    Itching, Post-pancreatectomy diabetes (H),  History of pancreatectomy, Pancreatic insufficiency       senna-docusate 8.6-50 MG per tablet    SENOKOT-S;PERICOLACE    100 tablet    Take 1 tablet by mouth 2 times daily as needed for constipation    Itching, Post-pancreatectomy diabetes (H), History of pancreatectomy, Pancreatic insufficiency       ursodiol 300 MG capsule    ACTIGALL    60 capsule    Take 1 capsule (300 mg) by mouth 2 times daily    Elevated LFTs       * Notice:  This list has 4 medication(s) that are the same as other medications prescribed for you. Read the directions carefully, and ask your doctor or other care provider to review them with you.

## 2017-07-13 NOTE — NURSING NOTE
Chief Complaint   Patient presents with     Port Flush     Port accessed for MRI     There were no vitals taken for this visit.    Port accessed by RN. Line flushed with NS & Heparin. Pt tolerated well.  Kaylie Ponce RN

## 2017-07-13 NOTE — PROGRESS NOTES
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    And feeling still fragile and abandoned, without a guide.    B/c she has trouble  asserting for herself    OBJECTIVE:              Length of Visit: 60 minutes        Mental status: Marked Distress         Session objective:   Continued behavioral pain management skill improvment         Behavioral inventions and response:                        CBT  On all nothing and reality testing some of her assumptions   Along with             Breathing Imagery                                                                              ASSESSMENT          Diagnoses:                             F54      Psychological Factors affecting other medical condition                                                                 Psychosocial and Contextual Factors: worsened         Progress toward goals: fair.              Pain status since onset of pain services: improved           Emotional status since onset of pain services: had fluctuating course       Medication / chemical use concerns: None        PLAN:               Next Appointment: Dinora Mcghee will schedule a follow-up appointment in 2 weeks.         Assignment: Establish a daily routine of stretching and exercise, Healthy breathing skills  for daily use and auditory EMDR         Objectives / interventions for next session:          Improve pain management skills: Goals include:  Constructively grieve losses which include  Discovering she has  another serious illness               Maria Elena Abdalla, Ph.D., CATRINA.P. ...............7/13/2017 2:06 PM                Medical Psychologist

## 2017-07-24 ENCOUNTER — TELEPHONE (OUTPATIENT)
Dept: RHEUMATOLOGY | Facility: CLINIC | Age: 52
End: 2017-07-24

## 2017-07-24 NOTE — TELEPHONE ENCOUNTER
Received call from Diana from [facility]. Requested clarification on rituximab orders. Needs to know dose and frequency, as well as whether patient was tested for hepatitis B and TB. Reviewed information over the phone. She is requesting documentation (dose / frequency, test results) be faxed to her at [phone].    Adela Quesada RN

## 2017-07-26 ENCOUNTER — TRANSFERRED RECORDS (OUTPATIENT)
Dept: HEALTH INFORMATION MANAGEMENT | Facility: CLINIC | Age: 52
End: 2017-07-26

## 2017-07-26 ASSESSMENT — ANXIETY QUESTIONNAIRES
1. FEELING NERVOUS, ANXIOUS, OR ON EDGE: SEVERAL DAYS
7. FEELING AFRAID AS IF SOMETHING AWFUL MIGHT HAPPEN: SEVERAL DAYS
6. BECOMING EASILY ANNOYED OR IRRITABLE: SEVERAL DAYS
3. WORRYING TOO MUCH ABOUT DIFFERENT THINGS: SEVERAL DAYS
2. NOT BEING ABLE TO STOP OR CONTROL WORRYING: SEVERAL DAYS
GAD7 TOTAL SCORE: 7
7. FEELING AFRAID AS IF SOMETHING AWFUL MIGHT HAPPEN: SEVERAL DAYS
GAD7 TOTAL SCORE: 7
GAD7 TOTAL SCORE: 7
4. TROUBLE RELAXING: SEVERAL DAYS
5. BEING SO RESTLESS THAT IT IS HARD TO SIT STILL: SEVERAL DAYS

## 2017-07-26 ASSESSMENT — ENCOUNTER SYMPTOMS
DECREASED APPETITE: 0
RECTAL PAIN: 0
CHILLS: 0
VOMITING: 0
TREMORS: 0
POLYPHAGIA: 1
ARTHRALGIAS: 1
MUSCLE CRAMPS: 1
DECREASED CONCENTRATION: 1
CONSTIPATION: 1
RECTAL BLEEDING: 0
INCREASED ENERGY: 1
HEARTBURN: 0
WEAKNESS: 0
DIZZINESS: 0
NECK MASS: 0
SPEECH CHANGE: 0
PARALYSIS: 0
NERVOUS/ANXIOUS: 1
ALTERED TEMPERATURE REGULATION: 1
WEIGHT GAIN: 0
EYE REDNESS: 1
EYE PAIN: 1
NIGHT SWEATS: 1
SEIZURES: 0
TINGLING: 1
HEADACHES: 1
JAUNDICE: 0
BLOOD IN STOOL: 0
EYE WATERING: 1
DISTURBANCES IN COORDINATION: 0
POLYDIPSIA: 0
EYE IRRITATION: 1
INSOMNIA: 1
HOT FLASHES: 1
BACK PAIN: 1
SMELL DISTURBANCE: 0
MUSCLE WEAKNESS: 0
DIARRHEA: 1
HALLUCINATIONS: 0
SORE THROAT: 1
NAUSEA: 1
MYALGIAS: 1
PANIC: 0
HOARSE VOICE: 1
DOUBLE VISION: 0
ABDOMINAL PAIN: 1
LOSS OF CONSCIOUSNESS: 0
FATIGUE: 1
FEVER: 0
BOWEL INCONTINENCE: 0
TASTE DISTURBANCE: 0
WEIGHT LOSS: 0
NECK PAIN: 1
SINUS CONGESTION: 1
TROUBLE SWALLOWING: 1
MEMORY LOSS: 1
BLOATING: 1
DEPRESSION: 0
SINUS PAIN: 1
STIFFNESS: 1
NUMBNESS: 1
DECREASED LIBIDO: 1
JOINT SWELLING: 0

## 2017-07-26 ASSESSMENT — ACTIVITIES OF DAILY LIVING (ADL): I_KEEP_THINKING_ABOUT_HOW_BADLY_I_WANT_THE_PAIN_TO_STOP: 0 = NOT AT ALL

## 2017-07-27 ASSESSMENT — ANXIETY QUESTIONNAIRES: GAD7 TOTAL SCORE: 7

## 2017-07-28 ENCOUNTER — OFFICE VISIT (OUTPATIENT)
Dept: ANESTHESIOLOGY | Facility: CLINIC | Age: 52
End: 2017-07-28

## 2017-07-28 DIAGNOSIS — F43.89 ADJUSTMENT REACTION TO CHRONIC STRESS: Primary | ICD-10-CM

## 2017-07-28 DIAGNOSIS — R94.5 ABNORMAL RESULTS OF LIVER FUNCTION STUDIES: Primary | ICD-10-CM

## 2017-07-28 NOTE — MR AVS SNAPSHOT
After Visit Summary   7/28/2017    Dinora Mcghee    MRN: [id number]           Patient Information     Date Of Birth          1965        Visit Information        Provider Department      7/28/2017 2:00 PM Maria Elena Abdalla, PhD [facility]        Today's Diagnoses     Adjustment reaction to chronic stress    -  1       Follow-ups after your visit        Follow-up notes from your care team     Return in about 1 month (around 8/28/2017).      Your next 10 appointments already scheduled     Aug 04, 2017  9:00 AM CDT   (Arrive by 8:45 AM)   Return Visit with Jesse Richardson DO   [facility] ([facility])    [address]  3rd [address]  [facility] [address] [phone]            Aug 08, 2017  2:20 PM CDT   (Arrive by 2:05 PM)   Return Visit with Martinez Arteaga MD   [facility] ([facility])    [address] [place] [address]  4th Floor  [facility] [id number] [phone]            Aug 14, 2017  7:45 AM CDT   Lab with  LAB   [facility] ([facility])    [address]  1st [address]  [facility] [address]   [phone]            Aug 14, 2017  8:50 AM CDT   (Arrive by 8:35 AM)   Return General Liver with Ara Lugo MD   [facility] ([facility])    [address] [place] [address]  3rd [facility] [id number] [phone]              Future tests that were ordered for you today     Open Future Orders        Priority Expected Expires Ordered    Hepatic panel Routine 7/28/2017 10/26/2017 7/28/2017    Basic metabolic panel Routine 7/28/2017 10/26/2017 7/28/2017    CBC with platelets Routine 7/28/2017 10/26/2017 7/28/2017    INR Routine 7/28/2017 10/26/2017 7/28/2017            Who to contact     Please call your clinic at [phone] to:    Ask questions about your  health    Make or cancel appointments    Discuss your medicines    Learn about your test results    Speak to your doctor   If you have compliments or concerns about an experience at your clinic, or if you wish to file a complaint, please contact [facility] Patient Relations at [phone] or email us at [email or web address]         Additional Information About Your Visit        MyChart Information     [employer] gives you secure access to your electronic health record. If you see a primary care provider, you can also send messages to your care team and make appointments. If you have questions, please call your primary care clinic.  If you do not have a primary care provider, please call [phone] and they will assist you.      [employer] is an electronic gateway that provides easy, online access to your medical records. With [employer], you can request a clinic appointment, read your test results, renew a prescription or communicate with your care team.     To access your existing account, please contact your [facility] or call [phone] for assistance.        Care EveryWhere ID     This is your Care EveryWhere ID. This could be used by other organizations to access your [place] medical records  [id number]         Blood Pressure from Last 3 Encounters:   06/20/17 102/71   06/19/17 120/76   05/20/17 110/68    Weight from Last 3 Encounters:   06/20/17 67.3 kg (148 lb 5.9 oz)   06/20/17 67.3 kg (148 lb 4.8 oz)   06/19/17 69.2 kg (152 lb 8 oz)              Today, you had the following     No orders found for display       Primary Care Provider Office Phone # Fax #    Justyna Lawson [phone]       [facility] [address]  [place] [address]        Equal Access to Services     ABHAY HUITRON : Romario Helton, luci suarez, erin hopkins, ivan guerra. So [facility]  [phone].    ATENCIÓN: Si freda champagne, tiene a muñoz disposición servicios gratuitos de asistencia lingüística. Howard yanez [phone].    We comply with applicable federal civil rights laws and [place] laws. We do not discriminate on the basis of race, color, national origin, age, disability sex, sexual orientation or gender identity.            Thank you!     Thank you for choosing [facility]  for your care. Our goal is always to provide you with excellent care. Hearing back from our patients is one way we can continue to improve our services. Please take a few minutes to complete the written survey that you may receive in the mail after your visit with us. Thank you!             Your Updated Medication List - Protect others around you: Learn how to safely use, store and throw away your medicines at www.disposemymeds.org.          This list is accurate as of: 7/28/17  3:01 PM.  Always use your most recent med list.                   Brand Name Dispense Instructions for use Diagnosis    amitriptyline 10 MG tablet    ELAVIL    60 tablet    Take 2 tablets (20 mg) by mouth At Bedtime    Itching, Post-pancreatectomy diabetes (H), History of pancreatectomy, Pancreatic insufficiency       amylase-lipase-protease 78497 UNITS Cpep per EC capsule    CREON 24    450 capsule    Take 3-4 capsules by mouth with meals and 1-2 with snacks. Maximum 15 capsules per day.    Acquired total absence of pancreas       aspirin 81 MG EC tablet     90 tablet    Take 1 tablet (81 mg) by mouth daily    High platelet count (H)       BD SHARPS  Misc     1 each    1 Container as needed    Post-pancreatectomy diabetes (H)       blood glucose monitoring lancets     1 Box    Use to test blood sugar 6-8 times daily or as directed.    Acute on chronic pancreatitis (H)       blood glucose monitoring test strip    PAT CONTOUR NEXT    200 strip    Use to test blood sugar 6-8 times daily or as  directed.    Acute on chronic pancreatitis (H), Post-pancreatectomy diabetes (H)       * BOOST HIGH PROTEIN Liqd      After above baseline labs are drawn, give: 6 mL/kg to maximum of 360 mL; the beverage is to be consumed within 5 minutes.    Post-pancreatectomy diabetes (H), Pancreatic insufficiency, History of pancreatectomy       * BOOST HIGH PROTEIN Liqd      After above baseline labs are drawn, give: 6 mL/kg to maximum of 360 mL; the beverage is to be consumed within 5 minutes.    Post-pancreatectomy diabetes (H), Pancreatic insufficiency       celecoxib 100 MG capsule    celeBREX    60 capsule    Take 1 capsule (100 mg) by mouth 2 times daily    Acute post-operative pain       cetirizine 10 MG tablet    zyrTEC     Take 10 mg by mouth daily    Elevated liver enzymes       CONTOUR NEXT EZ MONITOR W/DEVICE Kit      1 Device 6 times daily    Itching, Post-pancreatectomy diabetes (H), History of pancreatectomy, Pancreatic insufficiency       cyclobenzaprine 5 MG tablet    FLEXERIL    42 tablet    Take 5-10 mg by mouth three times per day as needed for muscle spasms.    Muscle spasm       diphenhydrAMINE 25 MG capsule    BENADRYL ALLERGY    56 capsule    Take 1 capsule (25 mg) by mouth every 6 hours as needed for itching or allergies    Itching, Post-pancreatectomy diabetes (H), History of pancreatectomy, Pancreatic insufficiency       docusate sodium 100 MG capsule    COLACE    60 capsule    Take 1 capsule (100 mg) by mouth 2 times daily as needed for constipation,    Itching, Post-pancreatectomy diabetes (H), History of pancreatectomy, Pancreatic insufficiency       glucagon 1 MG kit    GLUCAGON EMERGENCY    1 mg    Inject 1 mg into the muscle once for 1 dose    Post-pancreatectomy diabetes (H)       glucose 40 % Gel gel     3 Tube    Take 15-30 g by mouth every 15 minutes as needed for low blood sugar    Acquired total absence of pancreas       Injection Device for insulin Tika    NOVOPEN ECHO    1 each    1 each  daily as needed    Post-pancreatectomy diabetes (H)       * insulin aspart 100 UNIT/ML injection    NovoLOG PEN     aspart medium correction 1 unit per 50 > 130 every 4 hours ? For Pre-Meal  - 179 give 1 unit.  For Pre-Meal  - 230 give 2 units.  For Pre-Meal  - 281 give 3 units.  For Pre-Meal  - 332 give 4 units.    Acquired total absence of pancreas       * insulin aspart 100 UNIT/ML injection    NovoLOG PENFILL    3 mL    Administer subcutaneously 0.5 unit per 45 grams of carbohydrates.    Post-pancreatectomy diabetes (H)       insulin glargine 100 UNIT/ML injection    LANTUS    3 mL    Inject 5 units subcutaneously every morning.    Post-pancreatectomy diabetes (H)       insulin pen needle 32G X 4 MM    BD KEN U/F    100 each    Use 6-8 times per day    Acquired total absence of pancreas       LANsoprazole 30 MG CR capsule    PREVACID    30 capsule    Take 1 capsule (30 mg) by mouth daily Take 30-60 minutes before a meal.    Post-pancreatectomy diabetes (H), Pancreatic insufficiency       levothyroxine 125 MCG tablet    SYNTHROID/LEVOTHROID    1 tablet    Take 1 tablet (125 mcg), by mouth daily before breakfast.    Itching, Post-pancreatectomy diabetes (H), History of pancreatectomy, Pancreatic insufficiency       LORazepam 0.5 MG tablet    ATIVAN    20 tablet    Take 1 tablet (0.5 mg) by mouth every 6 hours as needed for anxiety    History of pancreatectomy, Anxiety       Lubiprostone 8 MCG Caps capsule     90 capsule    Take 1 capsule (8 mcg) by mouth 2 times daily. Take 1 capsule by mouth po BID x 5 days. If no effect, increase to 2 capsules po BID x 5 days. If no effect, increase to 3 capsules po BID and continue until further instruction.    Chronic constipation       metroNIDAZOLE 500 MG tablet    FLAGYL    15 tablet    Take 0.5 tablets (250 mg) by mouth 3 times daily    Bacterial overgrowth syndrome       multivitamin CF formula capsule    CHOICEFUL     Take 1 tablet by mouth daily     Itching, Post-pancreatectomy diabetes (H), History of pancreatectomy, Pancreatic insufficiency       oxyCODONE 5 MG IR tablet    ROXICODONE    150 tablet    Take 1-2 tablets (5-10 mg) by mouth every 4 hours as needed for moderate to severe pain    Acute post-operative pain       polyethylene glycol Packet    MIRALAX/GLYCOLAX    7 packet    Take 17 g by mouth daily as needed for constipation    Itching, Post-pancreatectomy diabetes (H), History of pancreatectomy, Pancreatic insufficiency       pregabalin 25 MG capsule    LYRICA    180 capsule    Take 3 capsules (75 mg) by mouth 2 times daily    Neuropathic pain, History of pancreatectomy, Chronic abdominal pain       prochlorperazine 5 MG tablet    COMPAZINE    90 tablet    Take two 5 mg tablets by mouth every six hours as needed for nausea.    Itching, Post-pancreatectomy diabetes (H), History of pancreatectomy, Pancreatic insufficiency       senna-docusate 8.6-50 MG per tablet    SENOKOT-S;PERICOLACE    100 tablet    Take 1 tablet by mouth 2 times daily as needed for constipation    Itching, Post-pancreatectomy diabetes (H), History of pancreatectomy, Pancreatic insufficiency       ursodiol 300 MG capsule    ACTIGALL    60 capsule    Take 1 capsule (300 mg) by mouth 2 times daily    Elevated LFTs       * Notice:  This list has 4 medication(s) that are the same as other medications prescribed for you. Read the directions carefully, and ask your doctor or other care provider to review them with you.

## 2017-07-28 NOTE — PROGRESS NOTES
[facility]   Comprehensive Pain Program   Psychology Progress Note    Patient Name: Dinora Mcghee    YOB: 1965   Medical Record Number: [id number]  Date: 7/28/2017              SUBJECTIVE            Interval history:  Up to walking 5 mi most days.   And off Lyrica 3 weeks and  finally feeling like herself with her usual coping strategies.   Mostly unhappy re her frozen shoulder   Things are better with her son with psych issues    OBJECTIVE:              Length of Visit: 55 minutes        Mental status: No Distress - Normal Mental Status          Session objective:   Continued behavioral pain management skill improvement         Behavioral inventions and response:                     CBT  On pacing and            Disc use of guided imagery for auto immune  benefit                                      ASSESSMENT          Diagnoses:                              F54      Psychological Factors affecting other medical condition                                                                 Psychosocial and Contextual Factors: improved         Progress toward goals: good.              Pain status since onset of pain services: improved           Emotional status since onset of pain services: improved       Medication / chemical use concerns: None        PLAN:               Next Appointment: Dinora Mcghee will schedule a follow-up appointment in 4 weeks.         Assignment:    Guided imagery   And check with Malcolm practioner         Objectives / interventions for next session:          Improve pain management skills: Goals include:  Learn to pace and moderate physical activity           Maria Elena Abdalla, Ph.D., L.P. ...............7/28/2017 1:58 PM                Medical Psychologist

## 2017-08-04 ENCOUNTER — MEDICAL CORRESPONDENCE (OUTPATIENT)
Dept: HEALTH INFORMATION MANAGEMENT | Facility: CLINIC | Age: 52
End: 2017-08-04

## 2017-08-04 ENCOUNTER — OFFICE VISIT (OUTPATIENT)
Dept: RHEUMATOLOGY | Facility: CLINIC | Age: 52
End: 2017-08-04
Attending: INTERNAL MEDICINE
Payer: COMMERCIAL

## 2017-08-04 VITALS
BODY MASS INDEX: 22.46 KG/M2 | HEART RATE: 83 BPM | DIASTOLIC BLOOD PRESSURE: 69 MMHG | WEIGHT: 148.2 LBS | SYSTOLIC BLOOD PRESSURE: 103 MMHG | HEIGHT: 68 IN | OXYGEN SATURATION: 99 %

## 2017-08-04 DIAGNOSIS — D89.84 IGG4-RELATED SCLEROSING DISEASE (H): ICD-10-CM

## 2017-08-04 DIAGNOSIS — D89.84 IGG4-RELATED SCLEROSING DISEASE (H): Primary | ICD-10-CM

## 2017-08-04 PROBLEM — D80.3: Status: RESOLVED | Noted: 2017-06-26 | Resolved: 2017-08-04

## 2017-08-04 LAB
ALBUMIN SERPL-MCNC: 3.9 G/DL (ref 3.4–5)
ALP SERPL-CCNC: 229 U/L (ref 40–150)
ALT SERPL W P-5'-P-CCNC: 28 U/L (ref 0–50)
ANION GAP SERPL CALCULATED.3IONS-SCNC: 6 MMOL/L (ref 3–14)
AST SERPL W P-5'-P-CCNC: 21 U/L (ref 0–45)
BASOPHILS # BLD AUTO: 0.1 10E9/L (ref 0–0.2)
BASOPHILS NFR BLD AUTO: 1.1 %
BILIRUB DIRECT SERPL-MCNC: 0.2 MG/DL (ref 0–0.2)
BILIRUB SERPL-MCNC: 0.8 MG/DL (ref 0.2–1.3)
BUN SERPL-MCNC: 17 MG/DL (ref 7–30)
CALCIUM SERPL-MCNC: 9.8 MG/DL (ref 8.5–10.1)
CHLORIDE SERPL-SCNC: 102 MMOL/L (ref 94–109)
CO2 SERPL-SCNC: 31 MMOL/L (ref 20–32)
CREAT SERPL-MCNC: 0.77 MG/DL (ref 0.52–1.04)
DIFFERENTIAL METHOD BLD: ABNORMAL
EOSINOPHIL # BLD AUTO: 0.2 10E9/L (ref 0–0.7)
EOSINOPHIL NFR BLD AUTO: 3.6 %
ERYTHROCYTE [DISTWIDTH] IN BLOOD BY AUTOMATED COUNT: 15.9 % (ref 10–15)
GFR SERPL CREATININE-BSD FRML MDRD: 79 ML/MIN/1.7M2
GLUCOSE SERPL-MCNC: 95 MG/DL (ref 70–99)
HCT VFR BLD AUTO: 44.1 % (ref 35–47)
HGB BLD-MCNC: 13.9 G/DL (ref 11.7–15.7)
IMM GRANULOCYTES # BLD: 0 10E9/L (ref 0–0.4)
IMM GRANULOCYTES NFR BLD: 0.2 %
INR PPP: 1.13 (ref 0.86–1.14)
LYMPHOCYTES # BLD AUTO: 1.1 10E9/L (ref 0.8–5.3)
LYMPHOCYTES NFR BLD AUTO: 25.2 %
MCH RBC QN AUTO: 29.1 PG (ref 26.5–33)
MCHC RBC AUTO-ENTMCNC: 31.5 G/DL (ref 31.5–36.5)
MCV RBC AUTO: 92 FL (ref 78–100)
MONOCYTES # BLD AUTO: 0.6 10E9/L (ref 0–1.3)
MONOCYTES NFR BLD AUTO: 13.9 %
NEUTROPHILS # BLD AUTO: 2.5 10E9/L (ref 1.6–8.3)
NEUTROPHILS NFR BLD AUTO: 56 %
NRBC # BLD AUTO: 0 10*3/UL
NRBC BLD AUTO-RTO: 0 /100
PLATELET # BLD AUTO: 435 10E9/L (ref 150–450)
POTASSIUM SERPL-SCNC: 4.9 MMOL/L (ref 3.4–5.3)
PROT SERPL-MCNC: 7.8 G/DL (ref 6.8–8.8)
RBC # BLD AUTO: 4.78 10E12/L (ref 3.8–5.2)
SODIUM SERPL-SCNC: 139 MMOL/L (ref 133–144)
WBC # BLD AUTO: 4.5 10E9/L (ref 4–11)

## 2017-08-04 PROCEDURE — 80048 BASIC METABOLIC PNL TOTAL CA: CPT | Performed by: INTERNAL MEDICINE

## 2017-08-04 PROCEDURE — 36415 COLL VENOUS BLD VENIPUNCTURE: CPT | Performed by: INTERNAL MEDICINE

## 2017-08-04 PROCEDURE — 80076 HEPATIC FUNCTION PANEL: CPT | Performed by: INTERNAL MEDICINE

## 2017-08-04 PROCEDURE — 85610 PROTHROMBIN TIME: CPT | Performed by: INTERNAL MEDICINE

## 2017-08-04 PROCEDURE — 85025 COMPLETE CBC W/AUTO DIFF WBC: CPT | Performed by: INTERNAL MEDICINE

## 2017-08-04 PROCEDURE — 99212 OFFICE O/P EST SF 10 MIN: CPT | Mod: ZF

## 2017-08-04 ASSESSMENT — PAIN SCALES - GENERAL: PAINLEVEL: EXTREME PAIN (8)

## 2017-08-04 NOTE — NURSING NOTE
"Chief Complaint   Patient presents with     RECHECK     3 month follow up for dry eyes and mouth        Initial /69  Pulse 83  Ht 1.727 m (5' 8\")  Wt 67.2 kg (148 lb 3.2 oz)  SpO2 99%  BMI 22.53 kg/m2 Estimated body mass index is 22.53 kg/(m^2) as calculated from the following:    Height as of this encounter: 1.727 m (5' 8\").    Weight as of this encounter: 67.2 kg (148 lb 3.2 oz).  Medication Reconciliation: complete   Meli Bruner CMA    "

## 2017-08-04 NOTE — PATIENT INSTRUCTIONS
-- we are continuing the re appeal process and if needed will do a peer to peer discussion with the insurance.  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 This is by mouth.  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

## 2017-08-04 NOTE — MR AVS SNAPSHOT
After Visit Summary   8/4/2017    Dinora Mcghee    MRN: [id number]           Patient Information     Date Of Birth          1965        Visit Information        Provider Department      8/4/2017 9:00 AM Jesse Richardson DO [facility] Rheumatology        Today's Diagnoses     IgG4-related sclerosing disease (H)    -  1    Elevation of level of transaminase or lactic acid dehydrogenase (LDH)          Care Instructions    -- we are continuing the re appeal process and if needed will do a peer to peer discussion with the insurance.  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 This is by mouth.  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          Follow-ups after your visit        Follow-up notes from your care team     Return in about 10 weeks (around 10/13/2017).      Your next 10 appointments already scheduled     Aug 08, 2017  2:20 PM CDT   (Arrive by 2:05 PM)   Return Visit with Martinez Arteaga MD   [facility] ([facility])    [address] [place] [id number] [phone]            Aug 14, 2017  7:45 AM CDT   Lab with  LAB   [facility] Lab ([facility])    [address] [place] [id number] [phone]            Aug 14, 2017  8:50 AM CDT   (Arrive by 8:35 AM)   Return General Liver with Ara Lugo MD   [facility] Hepatology ([facility])    [address] [place] [id number] [phone]            Oct 20, 2017  9:00 AM CDT   (Arrive by 8:45 AM)   Return Visit with Jesse Richardson DO   [facility] Rheumatology ([facility])    [address] [place] [id number] [phone]              Who  "to contact     If you have questions or need follow up information about today's clinic visit or your schedule please contact [facility] RHEUMATOLOGY directly at [phone].  Normal or non-critical lab and imaging results will be communicated to you by [facility], letter or phone within 4 business days after the clinic has received the results. If you do not hear from us within 7 days, please contact the clinic through [facility] or phone. If you have a critical or abnormal lab result, we will notify you by phone as soon as possible.  Submit refill requests through [facility] or call your pharmacy and they will forward the refill request to us. Please allow 3 business days for your refill to be completed.          Additional Information About Your Visit        [facility] Information     [facility] gives you secure access to your electronic health record. If you see a primary care provider, you can also send messages to your care team and make appointments. If you have questions, please call your primary care clinic.  If you do not have a primary care provider, please call [phone] and they will assist you.        Care EveryWhere ID     This is your Care EveryWhere ID. This could be used by other organizations to access your [facility] medical records  [id number]        Your Vitals Were     Pulse Height Pulse Oximetry BMI (Body Mass Index)          83 1.727 m (5' 8\") 99% 22.53 kg/m2         Blood Pressure from Last 3 Encounters:   08/04/17 103/69   06/20/17 102/71   06/19/17 120/76    Weight from Last 3 Encounters:   08/04/17 67.2 kg (148 lb 3.2 oz)   06/20/17 67.3 kg (148 lb 5.9 oz)   06/20/17 67.3 kg (148 lb 4.8 oz)                 Today's Medication Changes          These changes are accurate as of: 8/4/17 11:59 PM.  If you have any questions, ask your nurse or doctor.               Stop taking these medicines if you haven't already. Please contact your care team if you have questions.     BOOST HIGH PROTEIN Liqd   Stopped " by:  Jesse Richardson, DO           cyclobenzaprine 5 MG tablet   Commonly known as:  FLEXERIL   Stopped by:  Jesse Richardson, DO           LORazepam 0.5 MG tablet   Commonly known as:  ATIVAN   Stopped by:  Jesse Richardson, DO           pregabalin 25 MG capsule   Commonly known as:  LYRICA   Stopped by:  Jesse Richardson, DO                    Primary Care Provider Office Phone # Fax #    Justyna Lawson [phone]       [facility] [address] [facility] [address]  [place] [facility] [address]        Equal Access to Services     [facility]: Hadii aad ku hadasho Soomaali, waaxda luqadaha, qaybta kaalmada adeegyada, waxay idiin hayaan adeeg blane wallace . So [facility] [phone].    ATENCIÓN: Si habla español, tiene a muñoz disposición servicios gratuitos de asistencia lingüística. [facility] [phone].    We comply with applicable federal civil rights laws and [place] laws. We do not discriminate on the basis of race, color, national origin, age, disability sex, sexual orientation or gender identity.            Thank you!     Thank you for choosing [facility]  for your care. Our goal is always to provide you with excellent care. Hearing back from our patients is one way we can continue to improve our services. Please take a few minutes to complete the written survey that you may receive in the mail after your visit with us. Thank you!             Your Updated Medication List - Protect others around you: Learn how to safely use, store and throw away your medicines at www.disposemymeds.org.          This list is accurate as of: 8/4/17 11:59 PM.  Always use your most recent med list.                   Brand Name Dispense Instructions for use Diagnosis    amitriptyline 10 MG tablet    ELAVIL    60 tablet    Take 2 tablets (20 mg) by mouth At Bedtime    Itching, Post-pancreatectomy diabetes (H), History of pancreatectomy, Pancreatic insufficiency       amylase-lipase-protease 39501 UNITS  Cpep per EC capsule    CREON 24    450 capsule    Take 3-4 capsules by mouth with meals and 1-2 with snacks. Maximum 15 capsules per day.    Acquired total absence of pancreas       aspirin 81 MG EC tablet     90 tablet    Take 1 tablet (81 mg) by mouth daily    High platelet count (H)       BD SHARPS  Misc     1 each    1 Container as needed    Post-pancreatectomy diabetes (H)       blood glucose monitoring lancets     1 Box    Use to test blood sugar 6-8 times daily or as directed.    Acute on chronic pancreatitis (H)       blood glucose monitoring test strip    PAT CONTOUR NEXT    200 strip    Use to test blood sugar 6-8 times daily or as directed.    Acute on chronic pancreatitis (H), Post-pancreatectomy diabetes (H)       celecoxib 100 MG capsule    celeBREX    60 capsule    Take 1 capsule (100 mg) by mouth 2 times daily    Acute post-operative pain       cetirizine 10 MG tablet    zyrTEC     Take 10 mg by mouth daily    Elevated liver enzymes       CONTOUR NEXT EZ MONITOR W/DEVICE Kit      1 Device 6 times daily    Itching, Post-pancreatectomy diabetes (H), History of pancreatectomy, Pancreatic insufficiency       diphenhydrAMINE 25 MG capsule    BENADRYL ALLERGY    56 capsule    Take 1 capsule (25 mg) by mouth every 6 hours as needed for itching or allergies    Itching, Post-pancreatectomy diabetes (H), History of pancreatectomy, Pancreatic insufficiency       docusate sodium 100 MG capsule    COLACE    60 capsule    Take 1 capsule (100 mg) by mouth 2 times daily as needed for constipation,    Itching, Post-pancreatectomy diabetes (H), History of pancreatectomy, Pancreatic insufficiency       glucagon 1 MG kit    GLUCAGON EMERGENCY    1 mg    Inject 1 mg into the muscle once for 1 dose    Post-pancreatectomy diabetes (H)       glucose 40 % Gel gel     3 Tube    Take 15-30 g by mouth every 15 minutes as needed for low blood sugar    Acquired total absence of pancreas       Injection Device for  insulin Tika    NOVOPEN ECHO    1 each    1 each daily as needed    Post-pancreatectomy diabetes (H)       * insulin aspart 100 UNIT/ML injection    NovoLOG PEN     aspart medium correction 1 unit per 50 > 130 every 4 hours ? For Pre-Meal  - 179 give 1 unit.  For Pre-Meal  - 230 give 2 units.  For Pre-Meal  - 281 give 3 units.  For Pre-Meal  - 332 give 4 units.    Acquired total absence of pancreas       * insulin aspart 100 UNIT/ML injection    NovoLOG PENFILL    3 mL    Administer subcutaneously 0.5 unit per 45 grams of carbohydrates.    Post-pancreatectomy diabetes (H)       insulin glargine 100 UNIT/ML injection    LANTUS    3 mL    Inject 5 units subcutaneously every morning.    Post-pancreatectomy diabetes (H)       insulin pen needle 32G X 4 MM    BD KEN U/F    100 each    Use 6-8 times per day    Acquired total absence of pancreas       LANsoprazole 30 MG CR capsule    PREVACID    30 capsule    Take 1 capsule (30 mg) by mouth daily Take 30-60 minutes before a meal.    Post-pancreatectomy diabetes (H), Pancreatic insufficiency       levothyroxine 125 MCG tablet    SYNTHROID/LEVOTHROID    1 tablet    Take 1 tablet (125 mcg), by mouth daily before breakfast.    Itching, Post-pancreatectomy diabetes (H), History of pancreatectomy, Pancreatic insufficiency       Lubiprostone 8 MCG Caps capsule     90 capsule    Take 1 capsule (8 mcg) by mouth 2 times daily. Take 1 capsule by mouth po BID x 5 days. If no effect, increase to 2 capsules po BID x 5 days. If no effect, increase to 3 capsules po BID and continue until further instruction.    Chronic constipation       metroNIDAZOLE 500 MG tablet    FLAGYL    15 tablet    Take 0.5 tablets (250 mg) by mouth 3 times daily    Bacterial overgrowth syndrome       multivitamin CF formula capsule    CHOICEFUL     Take 1 tablet by mouth daily    Itching, Post-pancreatectomy diabetes (H), History of pancreatectomy, Pancreatic insufficiency        oxyCODONE 5 MG IR tablet    ROXICODONE    150 tablet    Take 1-2 tablets (5-10 mg) by mouth every 4 hours as needed for moderate to severe pain    Acute post-operative pain       polyethylene glycol Packet    MIRALAX/GLYCOLAX    7 packet    Take 17 g by mouth daily as needed for constipation    Itching, Post-pancreatectomy diabetes (H), History of pancreatectomy, Pancreatic insufficiency       prochlorperazine 5 MG tablet    COMPAZINE    90 tablet    Take two 5 mg tablets by mouth every six hours as needed for nausea.    Itching, Post-pancreatectomy diabetes (H), History of pancreatectomy, Pancreatic insufficiency       senna-docusate 8.6-50 MG per tablet    SENOKOT-S;PERICOLACE    100 tablet    Take 1 tablet by mouth 2 times daily as needed for constipation    Itching, Post-pancreatectomy diabetes (H), History of pancreatectomy, Pancreatic insufficiency       ursodiol 300 MG capsule    ACTIGALL    60 capsule    Take 1 capsule (300 mg) by mouth 2 times daily    Elevated LFTs       * Notice:  This list has 2 medication(s) that are the same as other medications prescribed for you. Read the directions carefully, and ask your doctor or other care provider to review them with you.

## 2017-08-04 NOTE — LETTER
8/4/2017      RE: Dinora Mcghee  [address] [facility] [address]       Rheumatology Clinic Visit-Fellow Note     Dinora Mcghee MRN# [id number]   YOB: 1965 Age: 52 year old     Date of Visit: 08/04/2017  Primary care provider: Justyna Lawson  Referring Provider: Dr. Ara Lugo with GI  Reason for Referral: Further evaluation and management for possible Sjogren's vs IgG4 related disease in patient with sicca symptoms and evidence of isolated? IgG4 in liver/pancreas.           Assessment and Plan:   Diagnosis:  # Sicca Symptoms  # Elevated transaminases with Hepatic Dome Lesion (improving)  # Suspicious for IgG4 related disease, IgG4 elevation in serum and on liver biopsy (> 60 HPF)  # Chronic Pancreatitis s/p pancreatectomy with islet autotransplant 12/2016.   # h/o endometriosis  # h/o pituitary mass with 2/2 DI now s/p transsphenoidal resection in 1990    Discussion:  51 y/o female with longstanding history of chronic pancreatitis s/p pancreatectomy with islet autotransplant 12/2016 who is overall improving but found to have elevated serum and > 60 HPF cells on liver biopsy staining. She has sicca symptoms for the past 5 years and this brings up the possibility of Sjogren's vs IgG4 related disease. No other evidence of IgG4 related activity with no RPF, aortitis, orbital disease, thyroid, pulmonary, or kidney disease. Interestingly, IgG4 disease can impact the hypophysis and she does have a history of pituitary issues back in 1990. Recent meta analysis assessing IgG4 serum sensitivity is 87.2% with 82% specificity (1). Her biopsy containing > 50 IgG4 staining plasma cells is highly suggestive of IgG4 related disease as well and meets recent proposed pathologic recommendations of IgG4 related disease (2). She meets many of the features for IgG4 related disease and given her largely unremarkable Sjogren's serologic evaluation we are most suspicious for IgG4 related disease as  her primary etiology of her sicca symptoms. Unfortunately, there is a significant percentage of patients who are seronegative for Sjogren's and thus further evaluation with lip biopsy is warranted.     1. Daniel Barber et al.  Diagnostic Value of Serum IgG4 for IgG4-Related Disease: A MADELYN-Compliant Systematic Review and [place]-Analysis.  Ed. Lj Quinn. Medicine 95.21 (2016): e3785. [employer] [facility]. Web. 17 May 2017.  2. Annabel V, Ever Y, Donovan HOLMANK, et al. Consensus statement on the pathology of IgG4-RD. Mod Pathol 2012;64:3061-7.    Plan:  -- recommend ENT lip Biopsy with Dian Armijo (stable symptoms no non urgent) to further stain/assess for IgG4 related disease vs Sjogren's (less likely).  -- therapeutic options for IgG4 related disease would normally be Rituximab preferred (but has steroids with infusion) vs MMF (no steroids but not ideal agent).  -- will need to coordinate with GI regarding if isolated GI manifestations or addition of sicca enough to treat vs watching given the SE of the therapies.   -- recommend Hep B core Ab and quantiferon gold in anticipation of possible Rituximab use  -- Patient deferred initiating Evoxac or restasis at this point in time.  -- discussed various lozenges that help sicca symptoms    Follow up:  RTC in 3 months, after lip biopsy and discussion with GI.    Prevention:  Pneumovax PPSV23: 12/2016  PCV13 (Prevnar): not in [facility]  Tdap: 1/7/2008?  Shingles vaccine: not recieved  Reminded if on biologic no live vaccine (flu mist, shingles): n/a  HBVsAg: negative  HBVcore: ordered  HCV: negative   Q gold (or T spot): ordered  Bactrim prophylaxis: n/a  Fungal prophylaxis: n/a  Vitamin D and calcium/bone health: Ca and Vitamin D    Immunizations:  Immunization History   Administered Date(s) Administered     HIB 12/01/2016     Influenza (IIV3) 09/29/2011, 11/15/2014, 10/21/2016     Influenza (intradermal) 11/01/2003, 09/29/2011     Influenza Vaccine IM 3yrs+ 4 Valent IIV4 10/24/2015, 10/21/2016      Influenza Vaccine, 3 YRS +, IM (QUADRIVALENT W/PRESERVATIVES) 11/04/2013     Meningococcal (Bexsero ) 12/09/2016     Meningococcal (Menactra ) 12/01/2016     Pneumococcal 23 valent 12/01/2016     TD (ADULT, 7+) 07/09/1998     TDAP Vaccine (Adacel) 01/07/2008     Discussed with attending Dr. Newman, who agrees with the above assessment and plan.    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  Document previously scanned on 1/12/17.  Validation form completed and scanned.  Code Status reflects choices in most recent ACP document..  Confirmed/documented designated decision maker(s).  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     Priority: Medium     Acute on chronic pancreatitis (H) 09/03/2016     Priority: Medium     Acute abdominal pain 08/02/2016     Priority: Medium     Abdominal pain, epigastric 10/23/2015     Priority: Medium     Severe protein-calorie malnutrition (H) 06/09/2015     Priority: Medium     Acute pancreatitis 06/03/2015     Priority: Medium     Abdominal pain 06/02/2015     Priority: Medium     S/P ERCP 06/02/2015     Priority: Medium     Abdominal  pain, generalized 04/20/2015     Priority: Medium     History of ERCP 04/20/2015     Priority: Medium     Other type of intractable migraine      Priority: Medium     Diagnosis updated by automated process. Provider to review and confirm.       GERD (gastroesophageal reflux disease) 12/01/2010     Priority: Medium     Lumbago 04/18/2005     Priority: Medium     History of L5-S1 degenerative disk disease.         Sprain and strain of other specified sites of hip and thigh 12/09/2002     Priority: Medium     Chondromalacia of patella 12/09/2002     Priority: Medium     Need for prophylactic immunotherapy      Priority: Medium     trees, grass, srw, dust mites, cat       Allergic rhinitis due to other allergen 12/21/2001     Priority: Medium     Hypothyroidism      Priority: Medium     Problem list name updated by automated process. Provider to review       Sensorineural hearing loss 01/10/2005     Priority: Medium     Problem list name updated by automated process. Provider to review       Vertiginous syndrome and labyrinthine disorder 01/10/2005     Priority: Medium     Problem list name updated by automated process. Provider to review              History of Present Illness:   Dinora Mcghee is a 52 year old female with a past medical history of hypothyroidism, h/o pituitary mass and secondary DI s/p transphenoidal resection 1990, HLD, left knee OA, lumbar DDD, chronic pancreatitis for 30 years, now s/p islet cell transplantation who presents today for further evaluation of elevated IgG4 found both in serum and liver biopsy as well as sicca symptoms.     She notes that she has had longstanding issues with pancreatitis without any mention of aortitis, RPF, urinary obstruction. She denies any history of cancers, lymphoma or leukemia. No family history of autoimmune conditions other than possible RA in her dad. She had distant DI secondary from pituitary mass (s/p removal and not needing hormones) and joint wise  "has longstanding OA of left knee. She mentions that for the past 5 years she has had dry eye requiring eye drops. Sometimes related to her allergies she has to now regularly use eye drops. \"Feels like sand\" is rubbing in eyes. She also noticed ~ 5 years ago dry mouth where a cracker would not dissolve. She has to constantly carry water and drink it to keep her mouth dry. She uses sugar free gum and lozenges. Most of her clinical symptom burden has been related to her pancreas with frequent ERCP and concern for Sphincter of Oddi dysfunction. She had issues with significant pancreatic insuffiencey with malnutrition and weight loss. She underwent islet cell autotransplantation 12/28/2017 and was found to have elevated IgG4 cells in her liver biopsy. Subsequent IgG4 levels were elevated.    Interim history: 6/20/2017 - 8/4/2017             Review of Systems:   Constitutional: denies fevers/chills, fatigue, patient is having weight gain s/p transplant.  Skin: denies rash, raynauds or alopecia  Eyes: denies hx of uvetitis, double vision, positive dry eyes  Ears/Nose/Throat:  denies recurrent URI or sinusitis, positive dry mouth, denies any oral or nasal ulcers  Respiratory: No shortness of breath, dyspnea on exertion, cough, or hemoptysis  Cardiovascular:  denies chest pain, palpitations, hx pleurisy  Gastrointestinal: denies diarrhea, blood in stool, hx of IBD, positive past pancreatitis.   Genitourinary: denies hematuria. Denies any RPF.   Musculoskeletal: denies red, tender, swollen joints, left shoulder frozen shoulder since surgery.   Neurologic:  denies headaches, seizures, some numbness or tingling s/p abdominal surgery.  Psychiatric:  denies history of depression or mental illness  Hematologic/Lymphatic/Immunologic:  denies history of blood clots, easy bruising, bleeding.  Endocrine:  Positive hypothyroidism.          Past Medical History:     Past Medical History:   Diagnosis Date     Allergic rhinitis, cause " unspecified      Allergy to other foods     strawberries, apples, celeries, alice, watermelon     Arthritis     left knee     Choledocholithiasis     long after cholecystectomy     Chronic abdominal pain      Chronic constipation      Chronic nausea      Chronic pancreatitis (H)      Degeneration of lumbar or lumbosacral intervertebral disc      Esophageal reflux     w/ hiatal hernia     Gastroparesis      Hiatal hernia      History of pituitary adenoma     s/p resection     Hypothyroidism      Migraines      Mild hyperlipidemia      On tube feeding diet     presence of GJ tube     Pancreatic disease     PD stricture, 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    Past Surgical History  Past Surgical History:   Procedure Laterality Date     ABDOMEN SURGERY      c sections: 93, 96, 98. endometriosis growth     APPENDECTOMY       C  DELIVERY ONLY       C  DELIVERY ONLY      repeat c section with incidental cystotomy with repair     C EXCIS PITUITARY,TRANSNASAL/SEPTAL      pituitary tumor removed for diabetes insipidus     C TOTAL ABDOM HYSTERECTOMY      w/ bilateral salpingoophorectomy      C WRIST ARTHROSCOP,RELEASE XVERS LIG Bilateral 08      SECTION       COLONOSCOPY       ENDOSCOPIC RETROGRADE CHOLANGIOPANCREATOGRAM N/A 2015    Procedure: ENDOSCOPIC RETROGRADE CHOLANGIOPANCREATOGRAM;  Surgeon: Mandeep Park MD;  Location: [facility]     ENDOSCOPIC RETROGRADE CHOLANGIOPANCREATOGRAM N/A 2016    Procedure: COMBINED ENDOSCOPIC RETROGRADE CHOLANGIOPANCREATOGRAPHY, PLACE TUBE/STENT;  Surgeon: Mandeep Park MD;  Location: [facility]     ENDOSCOPIC RETROGRADE CHOLANGIOPANCREATOGRAM N/A 3/17/2016    Procedure: COMBINED ENDOSCOPIC RETROGRADE CHOLANGIOPANCREATOGRAPHY, REMOVE FOREIGN BODY OR  STENT/TUBE;  Surgeon: Mandeep Park MD;  Location: [facility]     ENDOSCOPIC RETROGRADE CHOLANGIOPANCREATOGRAM N/A 8/2/2016    Procedure: COMBINED ENDOSCOPIC RETROGRADE CHOLANGIOPANCREATOGRAPHY, PLACE TUBE/STENT;  Surgeon: Mandeep Park MD;  Location: [facility]     ENDOSCOPIC RETROGRADE CHOLANGIOPANCREATOGRAM N/A 8/26/2016    Procedure: COMBINED ENDOSCOPIC RETROGRADE CHOLANGIOPANCREATOGRAPHY, REMOVE FOREIGN BODY OR STENT/TUBE;  Surgeon: Mandeep Park MD;  Location: [facility]     ENDOSCOPIC ULTRASOUND UPPER GASTROINTESTINAL TRACT (GI) N/A 10/3/2016    Procedure: ENDOSCOPIC ULTRASOUND, ESOPHAGOSCOPY / UPPER GASTROINTESTINAL TRACT (GI);  Surgeon: Guru Jose Rodas MD;  Location: [facility]     ESOPHAGOSCOPY, GASTROSCOPY, DUODENOSCOPY (EGD), COMBINED N/A 6/24/2015    Procedure: COMBINED ESOPHAGOSCOPY, GASTROSCOPY, DUODENOSCOPY (EGD), REMOVE FOREIGN BODY;  Surgeon: Mandeep Park MD;  [facility]: [facility]     ESOPHAGOSCOPY, GASTROSCOPY, DUODENOSCOPY (EGD), COMBINED N/A 10/25/2015    Procedure: COMBINED ESOPHAGOSCOPY, GASTROSCOPY, DUODENOSCOPY (EGD);  Surgeon: Sammy Amaro MD;  [facility]: [facility]     ESOPHAGOSCOPY, GASTROSCOPY, DUODENOSCOPY (EGD), COMBINED N/A 10/25/2015    Procedure: COMBINED ESOPHAGOSCOPY, GASTROSCOPY, DUODENOSCOPY (EGD), BIOPSY SINGLE OR MULTIPLE;  Surgeon: Sammy Amaro MD;  Location: [facility]     ESOPHAGOSCOPY, GASTROSCOPY, DUODENOSCOPY (EGD), DILATATION, COMBINED       EXCISE LESION TRUNK N/A 4/17/2017    Procedure: EXCISE LESION TRUNK;  Removal of Abdominal Foreign Body;  Surgeon: Nestor Phoenix MD;  [facility]     HC ESOPH/GAS REFLUX TEST W NASAL IMPED >1 HR N/A 11/19/2015    Procedure: ESOPHAGEAL IMPEDENCE FUNCTION TEST WITH 24 HOUR PH GREATER THAN 1 HOUR;  Surgeon: Thiago Apple MD;  Location: [facility]     HC [facility] ENDOSCOPY DIAG W BIOPSY  9/17/08     HC [facility] ENDOSCOPY DIAG W BIOPSY  9/27/12     HC UGI ENDOSCOPY W ESOPHAGEAL DILATION BALLOON  <30MM  9/17/08     HC UGI ENDOSCOPY W EUS N/A 5/5/2015    Procedure: COMBINED ENDOSCOPIC ULTRASOUND, ESOPHAGOSCOPY, GASTROSCOPY, DUODENOSCOPY (EGD);  Surgeon: Wm Dueñas MD;  Location: [facility]     INJECT TRANSVERSUS ABDOMINIS PLANE (TAP) BLOCK BILATERAL Left 9/22/2016    Procedure: INJECT TRANSVERSUS ABDOMINIS PLANE (TAP) BLOCK BILATERAL;  Surgeon: Dickson Corrigan MD;  [facility]: [facility]     laparoscopic pineda  1995     LAPAROSCOPIC PANCREATECTOMY, TRANSPLANT AUTO ISLET CELL N/A 12/28/2016    Procedure: LAPAROSCOPIC PANCREATECTOMY, TRANSPLANT AUTO ISLET CELL;  Surgeon: Nestor Phoenix MD;  Location: [facility]     transphenoidal pituitary resection  1990            Social History:     Social History     Occupational History     director [employer]     Social History Main Topics     Smoking status: Former Smoker     Packs/day: 0.50     Years: 6.00     Types: Cigarettes     Start date: 9/1/1985     Quit date: 1/1/1992     Smokeless tobacco: Never Used      Comment: no 2nd hand     Alcohol use No      Comment: last drink 6/2016  - moderate social     Drug use: No     Sexual activity: Yes     Partners: Male     Birth control/ protection: None      Comment: Hystectomy 1999   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 6 years of smoking and quit in 1992. Denies any ETOH.  and lives in the [facility].          Family History:     Family History   Problem Relation Age of Onset     Eye Disorder Father      cataract, detached retina     Myocardial Infarction Father 60     Lipids Father      CEREBROVASCULAR DISEASE Father      Depression Father      Substance Abuse Father      Anesthesia Reaction Father      stroke right after surgery     Lipids Mother      Hypertension Mother      Thyroid Disease Mother      Eye Disorder Son      ptosis     Eye Disorder Paternal Grandmother      cataract     Eye Disorder Paternal Grandfather      cataract     DIABETES Paternal Grandfather      Eye Disorder Maternal  Grandmother      cataract     Thyroid Disease Maternal Grandmother      Coronary Artery Disease Sister      Depression Sister      Depression Son      Anxiety Disorder Son      Thyroid Disease Sister      DIABETES Maternal Grandfather      Depression Nephew      Anxiety Disorder Nephew      Thyroid Disease Nephew      Type 2 Diabetes Cousin      paternal cousin     Father with likely RA following with multiple rheumatologists. 3 children healthy. 1 sister with thyroid issues. Denies any Sjogren's SLE, Scleroderma.            Allergies:     Allergies   Allergen Reactions     Apple Anaphylaxis     Depakote [Divalproex Sodium] Other (See Comments)     Chest pain     Zithromax [Azithromycin Dihydrate] Anaphylaxis     Noted in 4/7/08 ER     Darvocet [Propoxyphene N-Apap] Itching     Morphine Nausea and Vomiting and Rash     Nalbuphine Hcl Rash     RASH :nubaine     Zosyn [Piperacillin-Tazobactam In D5w] Rash     Possible allergy, did have a diffuse rash that seemed drug related but could have also been related to soap in the hospital.      Bactrim [Sulfamethoxazole W-Trimethoprim] Other (See Comments) and Nausea and Vomiting     Severely low liver function.     Cats      Compazine [Prochlorperazine] Other (See Comments)     Twitching. Takes Benedryl and is fine     Corticosteroids Other (See Comments)     All oral,IV and injectable steroids are contraindicated (unless in life threatening situations) in Islet Auto transplant recipients. They can cause irreversible loss of islet cell function. Please contact patients transplant care coordinator Mecca ANGEL @ [phone]/Pager [phone], and Endocrinologist prior to administration.     Dust Mites      Grass      Prednisone Other (See Comments)     Insomnia       Ragweeds      Tape [Adhesive Tape] Blisters     Trees      Zofran [Ondansetron] Other (See Comments)     migraine            Medications:     Current Outpatient Prescriptions   Medication Sig Dispense  Refill     ursodiol (ACTIGALL) 300 MG capsule Take 1 capsule (300 mg) by mouth 2 times daily 60 capsule 0     metroNIDAZOLE (FLAGYL) 500 MG tablet Take 0.5 tablets (250 mg) by mouth 3 times daily 15 tablet 0     pregabalin (LYRICA) 25 MG capsule Take 3 capsules (75 mg) by mouth 2 times daily 180 capsule 0     blood glucose monitoring (PAT CONTOUR NEXT) test strip Use to test blood sugar 6-8 times daily or as directed. 200 strip 3     amylase-lipase-protease (CREON 24) 41130 UNITS CPEP per EC capsule Take 3-4 capsules by mouth with meals and 1-2 with snacks. Maximum 15 capsules per day. 450 capsule 6     insulin glargine (LANTUS) 100 UNIT/ML injection Inject 5 units subcutaneously every morning. 3 mL 3     insulin pen needle (BD KEN U/F) 32G X 4 MM Use 6-8 times per day 100 each 11     celecoxib (CELEBREX) 100 MG capsule Take 1 capsule (100 mg) by mouth 2 times daily (Patient not taking: Reported on 6/19/2017) 60 capsule 1     Nutritional Supplements (BOOST HIGH PROTEIN) LIQD After above baseline labs are drawn, give: 6 mL/kg to maximum of 360 mL; the beverage is to be consumed within 5 minutes.  0     cetirizine (ZYRTEC) 10 MG tablet Take 10 mg by mouth daily       LORazepam (ATIVAN) 0.5 MG tablet Take 1 tablet (0.5 mg) by mouth every 6 hours as needed for anxiety (Patient not taking: Reported on 6/19/2017) 20 tablet 0     Lubiprostone 8 MCG CAPS capsule Take 1 capsule (8 mcg) by mouth 2 times daily.  Take 1 capsule by mouth po BID x 5 days.  If no effect, increase to 2 capsules po BID x 5 days.  If no effect, increase to 3 capsules po BID and continue until further instruction. 90 capsule 3     insulin aspart (NOVOLOG PENFILL) 100 UNIT/ML injection Administer subcutaneously 0.5 unit per 45 grams of carbohydrates. 3 mL 3     Nutritional Supplements (BOOST HIGH PROTEIN) LIQD After above baseline labs are drawn, give: 6 mL/kg to maximum of 360 mL; the beverage is to be consumed within 5 minutes.  0      cyclobenzaprine (FLEXERIL) 5 MG tablet Take 5-10 mg by mouth three times per day as needed for muscle spasms. 42 tablet 3     oxyCODONE (ROXICODONE) 5 MG IR tablet Take 1-2 tablets (5-10 mg) by mouth every 4 hours as needed for moderate to severe pain (Patient not taking: Reported on 6/19/2017) 150 tablet 0     insulin aspart (NOVOLOG PEN) 100 UNIT/ML injection aspart medium correction 1 unit per 50 > 130 every 4 hours    For Pre-Meal  - 179 give 1 unit.   For Pre-Meal  - 230 give 2 units.   For Pre-Meal  - 281 give 3 units.   For Pre-Meal  - 332 give 4 units.  3     amitriptyline (ELAVIL) 10 MG tablet Take 2 tablets (20 mg) by mouth At Bedtime 60 tablet 3     senna-docusate (SENOKOT-S;PERICOLACE) 8.6-50 MG per tablet Take 1 tablet by mouth 2 times daily as needed for constipation 100 tablet      prochlorperazine (COMPAZINE) 5 MG tablet Take two 5 mg tablets by mouth every six hours as needed for nausea. 90 tablet 3     polyethylene glycol (MIRALAX/GLYCOLAX) Packet Take 17 g by mouth daily as needed for constipation 7 packet 11     diphenhydrAMINE (BENADRYL ALLERGY) 25 MG capsule Take 1 capsule (25 mg) by mouth every 6 hours as needed for itching or allergies 56 capsule 0     levothyroxine (SYNTHROID/LEVOTHROID) 125 MCG tablet Take 1 tablet (125 mcg), by mouth daily before breakfast. 1 tablet 0     docusate sodium (COLACE) 100 MG capsule Take 1 capsule (100 mg) by mouth 2 times daily as needed for constipation, 60 capsule 0     multivitamin CF formula (CHOICEFUL) capsule Take 1 tablet by mouth daily  11     Blood Glucose Monitoring Suppl (CONTOUR NEXT EZ MONITOR) W/DEVICE KIT 1 Device 6 times daily       glucagon (GLUCAGON EMERGENCY) 1 MG kit Inject 1 mg into the muscle once for 1 dose 1 mg 1     LANsoprazole (PREVACID) 30 MG CR capsule Take 1 capsule (30 mg) by mouth daily Take 30-60 minutes before a meal. 30 capsule 11     aspirin 81 MG EC tablet Take 1 tablet (81 mg) by mouth daily 90  tablet 3     Injection Device for insulin (NOVOPEN ECHO) MARCO 1 each daily as needed 1 each 0     glucose 40 % GEL gel Take 15-30 g by mouth every 15 minutes as needed for low blood sugar 3 Tube 2     Sharps Container (BD SHARPS ) MISC 1 Container as needed 1 each 1     blood glucose monitoring (PAT MICROLET) lancets Use to test blood sugar 6-8 times daily or as directed. 1 Box prn            Physical Exam:   not currently breastfeeding.  Wt Readings from Last 4 Encounters:   06/20/17 67.3 kg (148 lb 5.9 oz)   06/20/17 67.3 kg (148 lb 4.8 oz)   06/19/17 69.2 kg (152 lb 8 oz)   05/20/17 68 kg (150 lb)     Ideal body weight: 63.9 kg (140 lb 14 oz)  Adjusted ideal body weight: 65.3 kg (143 lb 14 oz)    Constitutional: well-developed, appearing stated age; cooperative  Eyes: nl EOM, conjunctiva, sclera  ENT: nl external ears, nose, lips, teeth, gums, throat  No mucous membrane lesions, dry saliva pool, no parotid enlargement  Neck: no mass or thyroid enlargement  Resp: lungs clear to auscultation, nl to palpation  CV: RRR, no murmurs, rubs or gallops, no edema  GI: no ABD mass or tenderness  Lymph: no cervical, supraclavicular, epitrochlear nodes    MS: All TMJ, neck, shoulder, elbow, wrist, MCP/PIP/DIP, spine, hip, knee, ankle, and foot MTP/IP joints were examined.     No dactylitis,  tenosynovitis, enthesopathy  Soft tissue exam - No specific tenderness to palpation of soft tissue points on examination today   Neck - normal range of motion  Shoulder -  Right shoulder with normal ROM in full abduction/adduction and internal/external rotation without pain. Left shoulder with decreased abduction and IR and ER on both passive and active ROM. No glenohumeral effusion/warmth, no tenderness bilaterally. SC joints without effusion or tenderness on palpation.  Elbow - full ROM and no joint effusion on exam; nontender bilaterally   Wrist - full ROM and no joint effusion on exam; nontender bilaterally  Hand - T0S0 of  bilateral DIPs, PIPs, MCP joints. Normal hand- without pain.   Back - no specific spinal or paraspinal tenderness present  Pelvic - no tenderness along the SI joints, bilaterally.  Hip - full ROM; nontender bilaterally  Knee - no joint effusion or joint line tenderness on exam; normal range of motion.  Ankle -  Left and right ankle joint lines without swelling or tenderness. Non-tender to palpation  Foot - no focal pain or synovitis on palpation of the MTPs bilaterally    Skin: no nail pitting, alopecia, rash, nodules or lesions  Neuro: nl cranial nerves, strength in both proximal and distal UE and LE.   Psych: nl judgement, orientation, memory, affect.         Data:     Results for orders placed or performed in visit on 07/17/17   TXP External Lab Result   Result Value Ref Range    Sodium (External) 141 135 - 145 mmol/L    Potassium (External) 4.4 3.6 - 5.2 mmol/L    Chloride (External) 102 98 - 107 mmol/L    CO2 (External) 28 22 - 29 mmol/L    Anion Gap (External) 12 10 - 20 mmol/L    Alk Phosphatase (External) 290 (H) 41 - 108 U/L    Glucose (External) 109 70 - 139 mg/dl    Creatinine (External) 0.73 0.59 - 1.04 mg/dl    GFR Estimated (External) >60 >=60 ml/min/1.73 m(2)    GFR Est if Black (External) >60 >=60 ml/min/1.73 m(2)    Urea Nitrogen (External) 22 (H) 6 - 21 mg/dl    Calcium (External) 9.5 8.6 - 10.3 mg/dl    Protein Total (External) 7.1 6.3 - 7.9 g/dl    Albumin (External) 4.1 3.5 - 5.0 g/dl    AST (External) 62 (H) 8 - 43 U/L    ALT (External) 76 (H) 7 - 45 U/L    Bilirubin Total (External) 0.7 0.1 - 1.0 mg/dl    Narrative    Verified by Monica Villarreal on 07/17/2017.       Recent Labs   Lab Test  04/25/17   1355  04/04/17   0757  02/21/17   1315   12/29/16   0620   06/06/15   1903   10/23/12   1451   WBC  4.4  5.0  4.7   < >  10.7   < >   --    < >   --    RBC  4.38  4.04  4.28   < >  3.13*   < >   --    < >   --    HGB  11.1*  10.4*  11.6*   < >  9.1*   < >   --    < >   --    HCT  36.1   33.4*  37.1   < >  27.7*   < >   --    < >   --    MCV  82  83  87   < >  89   < >   --    < >   --    RDW  17.5*  15.8*  13.0   < >  13.2   < >   --    < >   --    PLT  543*  482*  733*   < >  107*   < >   --    < >   --    ALBUMIN  3.7  3.2*  3.7   < >  2.9*   < >  2.8*   < >  4.4   CRP   --    --    --    --   90.0*   --   71.0*   --   7.2   BUN  16  17  13   < >  7   < >  2*   < >   --     < > = values in this interval not displayed.      Recent Labs   Lab Test  12/20/16   1121  10/23/15   1554  10/09/13   0721   06/25/09   1423   TSH  0.13*  0.57  1.86   < >  0.37*   T4  1.17   --    --    --   1.17    < > = values in this interval not displayed.     Hemoglobin   Date Value Ref Range Status   06/20/2017 12.7 11.7 - 15.7 g/dL Final   05/20/2017 11.2 (L) 11.7 - 15.7 g/dL Final   04/25/2017 11.1 (L) 11.7 - 15.7 g/dL Final     Urea Nitrogen   Date Value Ref Range Status   06/20/2017 11 7 - 30 mg/dL Final   05/20/2017 10 7 - 30 mg/dL Final   04/25/2017 16 7 - 30 mg/dL Final     Creatinine   Date Value Ref Range Status   05/20/2017 0.6 0.52 - 1.04 mg/dL Final     Sed Rate   Date Value Ref Range Status   10/23/2012 11 0 - 20 mm/h Final   07/27/2006 9 0 - 20 mm/h Final     CRP Inflammation   Date Value Ref Range Status   12/29/2016 90.0 (H) 0.0 - 8.0 mg/L Final   06/06/2015 71.0 (H) 0.0 - 8.0 mg/L Final   10/23/2012 7.2 0.0 - 8.0 mg/L Final     AST   Date Value Ref Range Status   06/20/2017 127 (H) 0 - 45 U/L Final   05/20/2017 55 (H) 0 - 45 U/L Final   04/25/2017 58 (H) 0 - 45 U/L Final     Albumin   Date Value Ref Range Status   06/20/2017 3.7 3.4 - 5.0 g/dL Final   05/20/2017 3.5 3.4 - 5.0 g/dL Final   04/25/2017 3.7 3.4 - 5.0 g/dL Final     Alkaline Phosphatase   Date Value Ref Range Status   06/20/2017 475 (H) 40 - 150 U/L Final   05/20/2017 410 (H) 40 - 150 U/L Final   04/25/2017 363 (H) 40 - 150 U/L Final     ALT   Date Value Ref Range Status   06/20/2017 216 (H) 0 - 50 U/L Final   05/20/2017 113 (H) 0 - 50 U/L  Final   04/25/2017 85 (H) 0 - 50 U/L Final     Rheumatoid Factor   Date Value Ref Range Status   04/10/2017 <20 <20 IU/mL Final     Recent Labs   Lab Test  06/20/17   0929  05/20/17   1144  04/25/17   1355   12/20/16   1121   10/23/15   1554   10/09/13   0721   WBC  4.2  3.8*  4.4   < >   --    < >  4.8   < >   --    HGB  12.7  11.2*  11.1*   < >   --    < >  13.3   < >   --    HCT  40.1  36.3  36.1   < >   --    < >  39.1   < >   --    MCV  89  86  82   < >   --    < >  89   < >   --    PLT  447  583*  543*   < >   --    < >  212   < >   --    BUN  11  10  16   < >   --    < >  10   < >   --    TSH   --    --    --    --   0.13*   --   0.57   --   1.86   AST  127*  55*  58*   < >   --    < >  56*   < >  53*   ALT  216*  113*  85*   < >   --    < >  79*   < >  64*   ALKPHOS  475*  410*  363*   < >   --    < >  206*   < >  130    < > = values in this interval not displayed.     AMARI undetected  SSA, SSB, cryoglobulin all negative  Normal C3, C4  RF undetected  IGG subclasses with mild IGG4 elevation to 146H  SPEP with normal pattern and no monoclonal protein seen.    Recent LFT elevation from 100s -> 200s  Alk 400s.     Other studies:   Tissue biopsy 12/28/2016:  FINAL DIAGNOSIS:   A. Spleen, splenectomy:   - Splenic tissue with no significant histologic abnormality     B. Duodenum and pancreas, resection:   - Chronic pancreatitis   - Duodenum with no significant histologic abnormality     C. Pancreas, uncinate process, biopsy:   - Chronic pancreatitis     D. Liver, dome lesion, needle core biopsy:   - Liver with spindle cell proliferation/lesion, acute and chronic   inflammation and fibrosis   - Focally increased IgG4 positive plasma cells (upto 60/HPF)   - See comment     E. Pancreas, head, biopsy:   - Chronic pancreatitis     F. Pancreas, tail, biopsy:   - Chronic pancreatitis     G. Pancreas, body, biopsy:   - Chronic pancreatitis with fat necrosis     COMMENT:   Sections of the liver lesion show stromal  proliferation composed of   bland spindle cell bundle and whorls with associated fibrosis   infiltrated by neutrophils, lymphocytes, plasma cells and macrophages.   Occasional lymphoid aggregates are noted. The surrounding liver   parenchymal tissue show diffuse moderate portal inflammation composed of   lymphocytes and plasma cells.  There is also a mild to moderate   infiltrate of neutrophils.  Reticulin stain shows preserved reticulin   framework.  Trichrome stains highlight the spindle cell proliferation   and show mild portal fibrosis.  Immunostain are performed with   appropriate controls. The spindle cell proliferation is negative for ALK   and show patchy infiltrate of IgG4 positive plasma cells (upto 60/HPF).   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 Possibility of IgG4-related disease cannot be ruled out.   Clinical and radiologic correlation is recommended.     MRI Abdomen:4/22/2017  IMPRESSION:  1. 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  2. No significant change in small wedge-shaped peripheral areas of  persistent enhancement surrounding mildly dilated biliary radicles,  findings most compatible with cholangitis or sequela of previous  infection.  3. Nonspecific subcutaneous fluid collection in the left upper  quadrant amidst the abdominal wall postsurgical changes.    Reviewed Rheumatology lab flowsheet          Rheumatology Clinic Visit-Fellow Note     Dinora Mcghee MRN# [id number]   YOB: 1965 Age: 52 year old     Date of Visit: 08/04/2017  Primary care provider: Justyna Lawson  Referring Provider: Dr. Ara Lugo with GI  Reason for Referral: Further evaluation and management for possible Sjogren's vs IgG4 related disease in patient with sicca symptoms and evidence of isolated? IgG4 in liver/pancreas.           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   # h/o endometriosis  # h/o pituitary mass with 2/2 DI now s/p transsphenoidal resection in 1990    Discussion:  51 y/o female with longstanding history of chronic pancreatitis s/p pancreatectomy with islet autotransplant 12/2016 who is overall improving but found to have elevated serum and > 60 HPF cells on liver biopsy staining. She has sicca symptoms for the past 5 years and this brings up the possibility of Sjogren's vs IgG4 related disease. No other evidence of IgG4 related activity with no RPF, aortitis, orbital disease, thyroid, pulmonary, or kidney disease. Interestingly, IgG4 disease can impact the hypophysis and she does have a history of pituitary issues back in 1990. Recent meta analysis assessing IgG4 serum sensitivity is 87.2% with 82% specificity (1). Her biopsy containing > 50 IgG4 staining plasma cells is highly suggestive of IgG4 related disease as well and meets recent proposed pathologic recommendations of IgG4 related disease (2). She meets many of the features for IgG4 related disease including tissue biopsy. Unfortunately, her serologies and lip biopsy were negative for any evidence of IgG4-Related disease so at least her sicca symptoms are less likely from IgG4 RD involvement of her salivary and/or lacrimal glands.    In regards to management of her IgG4 Related disease there are no randomized control trials and all medicines are investigational. The most recent evidence highlights that the IgG4 antibodies are pathologic and that medications that suppress B lymphocytes like CD-20 cells (Rituximab) creating the antibodies are the preferred therapy. This is especially important if the organ dysfunction can be a critical organ like the liver in a patient with recent surgeries and transplant. If her GI  evaluation regarding her worsening fibrosis and involvement of liver dysfunction is thought to be caused by IgG4 related disease this adds even more impetus due to the potential irreversible damage from steroids on her auto islet cell transplant.     1. Daniel Barber et al.  Diagnostic Value of Serum IgG4 for IgG4-Related Disease: A MADELYN-Compliant Systematic Review and [place]-Analysis.  Ed. Lj Mikejerald. Medicine 95.21 (2016): e3785. [employer]. Web. 17 May 2017.  2. Annabel V, Ever Y, Donovan HOLMANK, et al. Consensus statement on the pathology of IgG4-RD. Mod Pathol 2012;64:3061-7.    Plan:  -- recheck BMP, Hepatic panel, INR, and CBC w/ diff  -- therapeutic options for IgG4 related disease would normally be Rituximab preferred (but has steroids with infusion) vs MMF (no steroids but not ideal agent).  -- do not recommend steroids at this time given the potential irreversible damage that can occur with islet cell transplants. May need to reconsider if liver dysfunction progresses.   -- appreciate GI input regarding worsening liver dysfunction and if thought to be due to IgG4 related disease then more impetus to treat given the potential severe morbidity with liver fibrosis.   -- Patient deferred initiating Evoxac or restasis at this point in time.  -- discussed various lozenges that help sicca symptoms    Follow up:  RTC in 10 weeks, unless new symptoms emerge. If needing to initiate MMF, then would want follow up 1 month after.    Prevention:  Pneumovax PPSV23: 12/2016  PCV13 (Prevnar): not in [facility]  Tdap: 1/7/2008?  Shingles vaccine: not recieved  Reminded if on biologic no live vaccine (flu mist, shingles): n/a  HBVsAg: negative  HBVcore: ordered  HCV: negative   Q gold (or T spot): ordered  Bactrim prophylaxis: n/a  Fungal prophylaxis: n/a  Vitamin D and calcium/bone health: Ca and Vitamin D    Immunizations:  Immunization History   Administered Date(s) Administered     HIB 12/01/2016     Influenza (IIV3) 09/29/2011,  11/15/2014, 10/21/2016     Influenza (intradermal) 11/01/2003, 09/29/2011     Influenza Vaccine IM 3yrs+ 4 Valent IIV4 10/24/2015, 10/21/2016     Influenza Vaccine, 3 YRS +, IM (QUADRIVALENT W/PRESERVATIVES) 11/04/2013     Meningococcal (Bexsero ) 12/09/2016     Meningococcal (Menactra ) 12/01/2016     Pneumococcal 23 valent 12/01/2016     TD (ADULT, 7+) 07/09/1998     TDAP Vaccine (Adacel) 01/07/2008     Discussed with attending Dr. Newman, who agrees with the above assessment and plan.    Jesse Richardson, DO  Rheumatology Fellow  Pager: [phone]    I saw this patient with the Rheumatology Fellow. I agree with the findings and recommendations.    Marlo Newman M.D.  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  Document previously scanned on 1/12/17.  Validation form completed and scanned.  Code Status reflects choices in most recent ACP document..  Confirmed/documented designated decision maker(s).  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     Priority: Medium     Acute on chronic pancreatitis (H) 09/03/2016     Priority: Medium     Acute abdominal pain 08/02/2016      Priority: Medium     Abdominal pain, epigastric 10/23/2015     Priority: Medium     Severe protein-calorie malnutrition (H) 06/09/2015     Priority: Medium     Acute pancreatitis 06/03/2015     Priority: Medium     Abdominal pain 06/02/2015     Priority: Medium     S/P ERCP 06/02/2015     Priority: Medium     Abdominal pain, generalized 04/20/2015     Priority: Medium     History of ERCP 04/20/2015     Priority: Medium     Other type of intractable migraine      Priority: Medium     Diagnosis updated by automated process. Provider to review and confirm.       GERD (gastroesophageal reflux disease) 12/01/2010     Priority: Medium     Lumbago 04/18/2005     Priority: Medium     History of L5-S1 degenerative disk disease.         Sprain and strain of other specified sites of hip and thigh 12/09/2002     Priority: Medium     Chondromalacia of patella 12/09/2002     Priority: Medium     Need for prophylactic immunotherapy      Priority: Medium     trees, grass, srw, dust mites, cat       Allergic rhinitis due to other allergen 12/21/2001     Priority: Medium     Hypothyroidism      Priority: Medium     Problem list name updated by automated process. Provider to review       Sensorineural hearing loss 01/10/2005     Priority: Medium     Problem list name updated by automated process. Provider to review       Vertiginous syndrome and labyrinthine disorder 01/10/2005     Priority: Medium     Problem list name updated by automated process. Provider to review              History of Present Illness:   Dinora Mcghee is a 52 year old female with a past medical history of hypothyroidism, h/o pituitary mass and secondary DI s/p transphenoidal resection 1990, HLD, left knee OA, lumbar DDD, chronic pancreatitis for 30 years, now s/p islet cell transplantation who presents today for further evaluation of elevated IgG4 found both in serum and liver biopsy as well as sicca symptoms.     She notes that she has had longstanding  "issues with pancreatitis without any mention of aortitis, RPF, urinary obstruction. She denies any history of cancers, lymphoma or leukemia. No family history of autoimmune conditions other than possible RA in her dad. She had distant DI secondary from pituitary mass (s/p removal and not needing hormones) and joint wise has longstanding OA of left knee. She mentions that for the past 5 years she has had dry eye requiring eye drops. Sometimes related to her allergies she has to now regularly use eye drops. \"Feels like sand\" is rubbing in eyes. She also noticed ~ 5 years ago dry mouth where a cracker would not dissolve. She has to constantly carry water and drink it to keep her mouth dry. She uses sugar free gum and lozenges. Most of her clinical symptom burden has been related to her pancreas with frequent ERCP and concern for Sphincter of Oddi dysfunction. She had issues with significant pancreatic insuffiencey with malnutrition and weight loss. She underwent islet cell autotransplantation 12/28/2017 and was found to have elevated IgG4 cells in her liver biopsy. Subsequent IgG4 levels were elevated.    Interim history: 6/20/2017 - 8/4/2017  Comes in today frustrated about the multiple denials from the insurance about Rituximab for her IgG4 Related disease. She has had recent elevations of her LFTs and mentions that the last 3 days she has started to feel worse like before her pancreatectomy. Some nausea, no vomiting, but her appetite is lower. She denies any jaundice, but does not itching of her skin. She denies any fevers, chills, but has had 1 day of watery, nonbloody diarrhea. She had a recent MRCP with signs of biliary dilatation at ducts within the liver. She has a new hyperenhancement lesion in her liver with stability of other lesions. She is following with Dr. Lugo within the next week. She mentions that her dry eyes are getting worse.            Review of Systems:   Constitutional: denies fevers/chills, " fatigue, patient is having weight gain s/p transplant.  Skin: denies rash, raynauds or alopecia  Eyes: denies hx of uvetitis, double vision, positive dry eyes  Ears/Nose/Throat:  denies recurrent URI or sinusitis, positive dry mouth, denies any oral or nasal ulcers  Respiratory: No shortness of breath, dyspnea on exertion, cough, or hemoptysis  Cardiovascular:  denies chest pain, palpitations, hx pleurisy  Gastrointestinal: denies diarrhea, blood in stool, hx of IBD, positive past pancreatitis, positive nausea, no vomiting, positive RUQ pain, decreased appetite.   Genitourinary: denies hematuria.  Musculoskeletal: denies red, tender, swollen joints, left shoulder frozen shoulder since surgery improving.  Neurologic:  denies headaches, seizures, some numbness or tingling s/p abdominal surgery.  Psychiatric:  denies history of depression or mental illness  Hematologic/Lymphatic/Immunologic:  denies history of blood clots, easy bruising, bleeding.  Endocrine:  Positive hypothyroidism.          Past Medical History:     Past Medical History:   Diagnosis Date     Allergic rhinitis, cause unspecified      Allergy to other foods     strawberries, apples, celeries, alice, watermelon     Arthritis     left knee     Choledocholithiasis     long after cholecystectomy     Chronic abdominal pain      Chronic constipation      Chronic nausea      Chronic pancreatitis (H)      Degeneration of lumbar or lumbosacral intervertebral disc      Esophageal reflux     w/ hiatal hernia     Gastroparesis      Hiatal hernia      History of pituitary adenoma     s/p resection     Hypothyroidism      Migraines      Mild hyperlipidemia      On tube feeding diet     presence of GJ tube     Pancreatic disease     PD stricture, 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    Past Surgical History  Past  Surgical History:   Procedure Laterality Date     ABDOMEN SURGERY      c sections: 93, 96, 98. endometriosis growth     APPENDECTOMY       C  DELIVERY ONLY       C  DELIVERY ONLY      repeat c section with incidental cystotomy with repair     C EXCIS PITUITARY,TRANSNASAL/SEPTAL      pituitary tumor removed for diabetes insipidus     C TOTAL ABDOM HYSTERECTOMY      w/ bilateral salpingoophorectomy      C WRIST ARTHROSCOP,RELEASE XVERS LIG Bilateral 08      SECTION       COLONOSCOPY       ENDOSCOPIC RETROGRADE CHOLANGIOPANCREATOGRAM N/A 2015    Procedure: ENDOSCOPIC RETROGRADE CHOLANGIOPANCREATOGRAM;  Surgeon: Mandeep Park MD;  Location: [facility]     ENDOSCOPIC RETROGRADE CHOLANGIOPANCREATOGRAM N/A 2016    Procedure: COMBINED ENDOSCOPIC RETROGRADE CHOLANGIOPANCREATOGRAPHY, PLACE TUBE/STENT;  Surgeon: Mandeep Park MD;  Location: [facility]     ENDOSCOPIC RETROGRADE CHOLANGIOPANCREATOGRAM N/A 3/17/2016    Procedure: COMBINED ENDOSCOPIC RETROGRADE CHOLANGIOPANCREATOGRAPHY, REMOVE FOREIGN BODY OR STENT/TUBE;  Surgeon: Mandeep Park MD;  Location: [facility]     ENDOSCOPIC RETROGRADE CHOLANGIOPANCREATOGRAM N/A 2016    Procedure: COMBINED ENDOSCOPIC RETROGRADE CHOLANGIOPANCREATOGRAPHY, PLACE TUBE/STENT;  Surgeon: Mandeep Park MD;  Location: [facility]     ENDOSCOPIC RETROGRADE CHOLANGIOPANCREATOGRAM N/A 2016    Procedure: COMBINED ENDOSCOPIC RETROGRADE CHOLANGIOPANCREATOGRAPHY, REMOVE FOREIGN BODY OR STENT/TUBE;  Surgeon: Mandeep Park MD;  Location: [facility]     ENDOSCOPIC ULTRASOUND UPPER GASTROINTESTINAL TRACT (GI) N/A 10/3/2016    Procedure: ENDOSCOPIC ULTRASOUND, ESOPHAGOSCOPY / UPPER GASTROINTESTINAL TRACT (GI);  Surgeon: Guru Jose Rodas MD;  Location: [facility]     ESOPHAGOSCOPY, GASTROSCOPY, DUODENOSCOPY (EGD), COMBINED N/A 2015    Procedure: COMBINED ESOPHAGOSCOPY, GASTROSCOPY,  DUODENOSCOPY (EGD), REMOVE FOREIGN BODY;  Surgeon: Mandeep Park MD;  Location: [facility]     ESOPHAGOSCOPY, GASTROSCOPY, DUODENOSCOPY (EGD), COMBINED N/A 10/25/2015    Procedure: COMBINED ESOPHAGOSCOPY, GASTROSCOPY, DUODENOSCOPY (EGD);  Surgeon: Sammy Amaro MD;  Location: [facility]     ESOPHAGOSCOPY, GASTROSCOPY, DUODENOSCOPY (EGD), COMBINED N/A 10/25/2015    Procedure: COMBINED ESOPHAGOSCOPY, GASTROSCOPY, DUODENOSCOPY (EGD), BIOPSY SINGLE OR MULTIPLE;  Surgeon: Sammy Amaro MD;  Location: [facility]     ESOPHAGOSCOPY, GASTROSCOPY, DUODENOSCOPY (EGD), DILATATION, COMBINED       EXCISE LESION TRUNK N/A 4/17/2017    Procedure: EXCISE LESION TRUNK;  Removal of Abdominal Foreign Body;  Surgeon: Nestor Phoenix MD;  [facility]     HC ESOPH/GAS REFLUX TEST W NASAL IMPED >1 HR N/A 11/19/2015    Procedure: ESOPHAGEAL IMPEDENCE FUNCTION TEST WITH 24 HOUR PH GREATER THAN 1 HOUR;  Surgeon: Thiago Apple MD;  Location: [facility]     HC UGI ENDOSCOPY DIAG W BIOPSY  9/17/08     HC UGI ENDOSCOPY DIAG W BIOPSY  9/27/12     HC UGI ENDOSCOPY W ESOPHAGEAL DILATION BALLOON <30MM  9/17/08     HC UGI ENDOSCOPY W EUS N/A 5/5/2015    Procedure: COMBINED ENDOSCOPIC ULTRASOUND, ESOPHAGOSCOPY, GASTROSCOPY, DUODENOSCOPY (EGD);  Surgeon: Wm Dueñas MD;  Location: [facility]     INJECT TRANSVERSUS ABDOMINIS PLANE (TAP) BLOCK BILATERAL Left 9/22/2016    Procedure: INJECT TRANSVERSUS ABDOMINIS PLANE (TAP) BLOCK BILATERAL;  Surgeon: Dickson Corrigan MD;  [facility]: [facility]     laparoscopic pineda  1995     LAPAROSCOPIC PANCREATECTOMY, TRANSPLANT AUTO ISLET CELL N/A 12/28/2016    Procedure: LAPAROSCOPIC PANCREATECTOMY, TRANSPLANT AUTO ISLET CELL;  Surgeon: Nestor Phoenix MD;  Location: [facility]     transphenoidal pituitary resection  1990            Social History:     Social History     Occupational History     director [employer]     Social History Main Topics     Smoking status: Former Smoker     Packs/day: 0.50     Years: 6.00      Types: Cigarettes     Start date: 9/1/1985     Quit date: 1/1/1992     Smokeless tobacco: Never Used      Comment: no 2nd hand     Alcohol use No      Comment: last drink 6/2016  - moderate social     Drug use: No     Sexual activity: Yes     Partners: Male     Birth control/ protection: None      Comment: Hystectomy 1999   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 6 years of smoking and quit in 1992. Denies any ETOH.  and lives in the [facility].          Family History:     Family History   Problem Relation Age of Onset     Eye Disorder Father      cataract, detached retina     Myocardial Infarction Father 60     Lipids Father      CEREBROVASCULAR DISEASE Father      Depression Father      Substance Abuse Father      Anesthesia Reaction Father      stroke right after surgery     Lipids Mother      Hypertension Mother      Thyroid Disease Mother      Eye Disorder Son      ptosis     Eye Disorder Paternal Grandmother      cataract     Eye Disorder Paternal Grandfather      cataract     DIABETES Paternal Grandfather      Eye Disorder Maternal Grandmother      cataract     Thyroid Disease Maternal Grandmother      Coronary Artery Disease Sister      Depression Sister      Depression Son      Anxiety Disorder Son      Thyroid Disease Sister      DIABETES Maternal Grandfather      Depression Nephew      Anxiety Disorder Nephew      Thyroid Disease Nephew      Type 2 Diabetes Cousin      paternal cousin     Father with likely RA following with multiple rheumatologists. 3 children healthy. 1 sister with thyroid issues. Denies any Sjogren's SLE, Scleroderma.            Allergies:     Allergies   Allergen Reactions     Apple Anaphylaxis     Depakote [Divalproex Sodium] Other (See Comments)     Chest pain     Zithromax [Azithromycin Dihydrate] Anaphylaxis     Noted in 4/7/08 ER     Darvocet [Propoxyphene N-Apap] Itching     Morphine Nausea and Vomiting and Rash     Nalbuphine Hcl Rash      RASH :nubaine     Zosyn [Piperacillin-Tazobactam In D5w] Rash     Possible allergy, did have a diffuse rash that seemed drug related but could have also been related to soap in the hospital.      Bactrim [Sulfamethoxazole W-Trimethoprim] Other (See Comments) and Nausea and Vomiting     Severely low liver function.     Cats      Compazine [Prochlorperazine] Other (See Comments)     Twitching. Takes Benedryl and is fine     Corticosteroids Other (See Comments)     All oral,IV and injectable steroids are contraindicated (unless in life threatening situations) in Islet Auto transplant recipients. They can cause irreversible loss of islet cell function. Please contact patients transplant care coordinator Mecca Watkins RN BSN @ [phone]/Pager [phone], and Endocrinologist prior to administration.     Dust Mites      Grass      Prednisone Other (See Comments)     Insomnia       Ragweeds      Tape [Adhesive Tape] Blisters     Trees      Zofran [Ondansetron] Other (See Comments)     migraine            Medications:     Current Outpatient Prescriptions   Medication Sig Dispense Refill     ursodiol (ACTIGALL) 300 MG capsule Take 1 capsule (300 mg) by mouth 2 times daily 60 capsule 0     metroNIDAZOLE (FLAGYL) 500 MG tablet Take 0.5 tablets (250 mg) by mouth 3 times daily 15 tablet 0     pregabalin (LYRICA) 25 MG capsule Take 3 capsules (75 mg) by mouth 2 times daily 180 capsule 0     blood glucose monitoring (PAT CONTOUR NEXT) test strip Use to test blood sugar 6-8 times daily or as directed. 200 strip 3     amylase-lipase-protease (CREON 24) 49052 UNITS CPEP per EC capsule Take 3-4 capsules by mouth with meals and 1-2 with snacks. Maximum 15 capsules per day. 450 capsule 6     insulin glargine (LANTUS) 100 UNIT/ML injection Inject 5 units subcutaneously every morning. 3 mL 3     insulin pen needle (BD KEN U/F) 32G X 4 MM Use 6-8 times per day 100 each 11     celecoxib (CELEBREX) 100 MG capsule Take 1 capsule (100  mg) by mouth 2 times daily (Patient not taking: Reported on 6/19/2017) 60 capsule 1     Nutritional Supplements (BOOST HIGH PROTEIN) LIQD After above baseline labs are drawn, give: 6 mL/kg to maximum of 360 mL; the beverage is to be consumed within 5 minutes.  0     cetirizine (ZYRTEC) 10 MG tablet Take 10 mg by mouth daily       LORazepam (ATIVAN) 0.5 MG tablet Take 1 tablet (0.5 mg) by mouth every 6 hours as needed for anxiety (Patient not taking: Reported on 6/19/2017) 20 tablet 0     Lubiprostone 8 MCG CAPS capsule Take 1 capsule (8 mcg) by mouth 2 times daily.  Take 1 capsule by mouth po BID x 5 days.  If no effect, increase to 2 capsules po BID x 5 days.  If no effect, increase to 3 capsules po BID and continue until further instruction. 90 capsule 3     insulin aspart (NOVOLOG PENFILL) 100 UNIT/ML injection Administer subcutaneously 0.5 unit per 45 grams of carbohydrates. 3 mL 3     Nutritional Supplements (BOOST HIGH PROTEIN) LIQD After above baseline labs are drawn, give: 6 mL/kg to maximum of 360 mL; the beverage is to be consumed within 5 minutes.  0     cyclobenzaprine (FLEXERIL) 5 MG tablet Take 5-10 mg by mouth three times per day as needed for muscle spasms. 42 tablet 3     oxyCODONE (ROXICODONE) 5 MG IR tablet Take 1-2 tablets (5-10 mg) by mouth every 4 hours as needed for moderate to severe pain (Patient not taking: Reported on 6/19/2017) 150 tablet 0     insulin aspart (NOVOLOG PEN) 100 UNIT/ML injection aspart medium correction 1 unit per 50 > 130 every 4 hours    For Pre-Meal  - 179 give 1 unit.   For Pre-Meal  - 230 give 2 units.   For Pre-Meal  - 281 give 3 units.   For Pre-Meal  - 332 give 4 units.  3     amitriptyline (ELAVIL) 10 MG tablet Take 2 tablets (20 mg) by mouth At Bedtime 60 tablet 3     senna-docusate (SENOKOT-S;PERICOLACE) 8.6-50 MG per tablet Take 1 tablet by mouth 2 times daily as needed for constipation 100 tablet      prochlorperazine (COMPAZINE) 5 MG  tablet Take two 5 mg tablets by mouth every six hours as needed for nausea. 90 tablet 3     polyethylene glycol (MIRALAX/GLYCOLAX) Packet Take 17 g by mouth daily as needed for constipation 7 packet 11     diphenhydrAMINE (BENADRYL ALLERGY) 25 MG capsule Take 1 capsule (25 mg) by mouth every 6 hours as needed for itching or allergies 56 capsule 0     levothyroxine (SYNTHROID/LEVOTHROID) 125 MCG tablet Take 1 tablet (125 mcg), by mouth daily before breakfast. 1 tablet 0     docusate sodium (COLACE) 100 MG capsule Take 1 capsule (100 mg) by mouth 2 times daily as needed for constipation, 60 capsule 0     multivitamin CF formula (CHOICEFUL) capsule Take 1 tablet by mouth daily  11     Blood Glucose Monitoring Suppl (CONTOUR NEXT EZ MONITOR) W/DEVICE KIT 1 Device 6 times daily       glucagon (GLUCAGON EMERGENCY) 1 MG kit Inject 1 mg into the muscle once for 1 dose 1 mg 1     LANsoprazole (PREVACID) 30 MG CR capsule Take 1 capsule (30 mg) by mouth daily Take 30-60 minutes before a meal. 30 capsule 11     aspirin 81 MG EC tablet Take 1 tablet (81 mg) by mouth daily 90 tablet 3     Injection Device for insulin (NOVOPEN ECHO) MARCO 1 each daily as needed 1 each 0     glucose 40 % GEL gel Take 15-30 g by mouth every 15 minutes as needed for low blood sugar 3 Tube 2     Sharps Container (BD SHARPS ) MISC 1 Container as needed 1 each 1     blood glucose monitoring (PAT MICROLET) lancets Use to test blood sugar 6-8 times daily or as directed. 1 Box prn            Physical Exam:   not currently breastfeeding.  Wt Readings from Last 4 Encounters:   06/20/17 67.3 kg (148 lb 5.9 oz)   06/20/17 67.3 kg (148 lb 4.8 oz)   06/19/17 69.2 kg (152 lb 8 oz)   05/20/17 68 kg (150 lb)     Ideal body weight: 63.9 kg (140 lb 14 oz)  Adjusted ideal body weight: 65.3 kg (143 lb 14 oz)    Constitutional: well-developed, appearing stated age; cooperative  Eyes: nl EOM, conjunctiva, sclera. No icterus.   ENT: nl external ears, nose, lips,  teeth, gums, throat  No mucous membrane lesions, dry saliva pool, no parotid enlargement  Neck: no mass or thyroid enlargement  Resp: lungs clear to auscultation, nl to palpation  CV: RRR, no murmurs, rubs or gallops, no edema  GI: no ABD mass but new RUQ tenderness with palpation as well as rebound tenderness.   Lymph: no cervical, supraclavicular, epitrochlear nodes    MS: All TMJ, neck, shoulder, elbow, wrist, MCP/PIP/DIP, spine, hip, knee, ankle, and foot MTP/IP joints were examined.     No dactylitis,  tenosynovitis, enthesopathy  Soft tissue exam - No specific tenderness to palpation of soft tissue points on examination today   Neck - normal range of motion  Shoulder -  Right shoulder with normal ROM in full abduction/adduction and internal/external rotation without pain. Left shoulder with decreased abduction and IR and ER on both passive and active ROM. No glenohumeral effusion/warmth, no tenderness bilaterally. SC joints without effusion or tenderness on palpation.  Elbow - full ROM and no joint effusion on exam; nontender bilaterally   Wrist - full ROM and no joint effusion on exam; nontender bilaterally  Hand - T0S0 of bilateral DIPs, PIPs, MCP joints. Normal hand- without pain.   Back - no specific spinal or paraspinal tenderness present  Pelvic - no tenderness along the SI joints, bilaterally.  Hip - full ROM; nontender bilaterally  Knee - no joint effusion or joint line tenderness on exam; normal range of motion.  Ankle -  Left and right ankle joint lines without swelling or tenderness. Non-tender to palpation  Foot - no focal pain or synovitis on palpation of the MTPs bilaterally    Skin: no nail pitting, alopecia, rash, nodules or lesions  Neuro: nl cranial nerves, strength in both proximal and distal UE and LE.   Psych: nl judgement, orientation, memory, affect.         Data:     Results for orders placed or performed in visit on 07/17/17   TXP External Lab Result   Result Value Ref Range     Sodium (External) 141 135 - 145 mmol/L    Potassium (External) 4.4 3.6 - 5.2 mmol/L    Chloride (External) 102 98 - 107 mmol/L    CO2 (External) 28 22 - 29 mmol/L    Anion Gap (External) 12 10 - 20 mmol/L    Alk Phosphatase (External) 290 (H) 41 - 108 U/L    Glucose (External) 109 70 - 139 mg/dl    Creatinine (External) 0.73 0.59 - 1.04 mg/dl    GFR Estimated (External) >60 >=60 ml/min/1.73 m(2)    GFR Est if Black (External) >60 >=60 ml/min/1.73 m(2)    Urea Nitrogen (External) 22 (H) 6 - 21 mg/dl    Calcium (External) 9.5 8.6 - 10.3 mg/dl    Protein Total (External) 7.1 6.3 - 7.9 g/dl    Albumin (External) 4.1 3.5 - 5.0 g/dl    AST (External) 62 (H) 8 - 43 U/L    ALT (External) 76 (H) 7 - 45 U/L    Bilirubin Total (External) 0.7 0.1 - 1.0 mg/dl    Narrative    Verified by Monica Villarreal on 07/17/2017.       Recent Labs   Lab Test  04/25/17   1355  04/04/17   0757  02/21/17   1315   12/29/16   0620   06/06/15   1903   10/23/12   1451   WBC  4.4  5.0  4.7   < >  10.7   < >   --    < >   --    RBC  4.38  4.04  4.28   < >  3.13*   < >   --    < >   --    HGB  11.1*  10.4*  11.6*   < >  9.1*   < >   --    < >   --    HCT  36.1  33.4*  37.1   < >  27.7*   < >   --    < >   --    MCV  82  83  87   < >  89   < >   --    < >   --    RDW  17.5*  15.8*  13.0   < >  13.2   < >   --    < >   --    PLT  543*  482*  733*   < >  107*   < >   --    < >   --    ALBUMIN  3.7  3.2*  3.7   < >  2.9*   < >  2.8*   < >  4.4   CRP   --    --    --    --   90.0*   --   71.0*   --   7.2   BUN  16  17  13   < >  7   < >  2*   < >   --     < > = values in this interval not displayed.      Recent Labs   Lab Test  12/20/16   1121  10/23/15   1554  10/09/13   0721   06/25/09   1423   TSH  0.13*  0.57  1.86   < >  0.37*   T4  1.17   --    --    --   1.17    < > = values in this interval not displayed.     Hemoglobin   Date Value Ref Range Status   06/20/2017 12.7 11.7 - 15.7 g/dL Final   05/20/2017 11.2 (L) 11.7 - 15.7 g/dL Final    04/25/2017 11.1 (L) 11.7 - 15.7 g/dL Final     Urea Nitrogen   Date Value Ref Range Status   06/20/2017 11 7 - 30 mg/dL Final   05/20/2017 10 7 - 30 mg/dL Final   04/25/2017 16 7 - 30 mg/dL Final     Creatinine   Date Value Ref Range Status   05/20/2017 0.6 0.52 - 1.04 mg/dL Final     Sed Rate   Date Value Ref Range Status   10/23/2012 11 0 - 20 mm/h Final   07/27/2006 9 0 - 20 mm/h Final     CRP Inflammation   Date Value Ref Range Status   12/29/2016 90.0 (H) 0.0 - 8.0 mg/L Final   06/06/2015 71.0 (H) 0.0 - 8.0 mg/L Final   10/23/2012 7.2 0.0 - 8.0 mg/L Final     AST   Date Value Ref Range Status   06/20/2017 127 (H) 0 - 45 U/L Final   05/20/2017 55 (H) 0 - 45 U/L Final   04/25/2017 58 (H) 0 - 45 U/L Final     Albumin   Date Value Ref Range Status   06/20/2017 3.7 3.4 - 5.0 g/dL Final   05/20/2017 3.5 3.4 - 5.0 g/dL Final   04/25/2017 3.7 3.4 - 5.0 g/dL Final     Alkaline Phosphatase   Date Value Ref Range Status   06/20/2017 475 (H) 40 - 150 U/L Final   05/20/2017 410 (H) 40 - 150 U/L Final   04/25/2017 363 (H) 40 - 150 U/L Final     ALT   Date Value Ref Range Status   06/20/2017 216 (H) 0 - 50 U/L Final   05/20/2017 113 (H) 0 - 50 U/L Final   04/25/2017 85 (H) 0 - 50 U/L Final     Rheumatoid Factor   Date Value Ref Range Status   04/10/2017 <20 <20 IU/mL Final     Recent Labs   Lab Test  06/20/17   0929  05/20/17   1144  04/25/17   1355   12/20/16   1121   10/23/15   1554   10/09/13   0721   WBC  4.2  3.8*  4.4   < >   --    < >  4.8   < >   --    HGB  12.7  11.2*  11.1*   < >   --    < >  13.3   < >   --    HCT  40.1  36.3  36.1   < >   --    < >  39.1   < >   --    MCV  89  86  82   < >   --    < >  89   < >   --    PLT  447  583*  543*   < >   --    < >  212   < >   --    BUN  11  10  16   < >   --    < >  10   < >   --    TSH   --    --    --    --   0.13*   --   0.57   --   1.86   AST  127*  55*  58*   < >   --    < >  56*   < >  53*   ALT  216*  113*  85*   < >   --    < >  79*   < >  64*   ALKPHOS  475*   410*  363*   < >   --    < >  206*   < >  130    < > = values in this interval not displayed.     AMARI undetected  SSA, SSB, cryoglobulin all negative  Normal C3, C4  RF undetected  IGG subclasses with mild IGG4 elevation to 146H  SPEP with normal pattern and no monoclonal protein seen.    Recent LFT elevation from 100s -> 200s  Alk 400s.     Other studies:   Tissue biopsy 12/28/2016:  FINAL DIAGNOSIS:   A. Spleen, splenectomy:   - Splenic tissue with no significant histologic abnormality     B. Duodenum and pancreas, resection:   - Chronic pancreatitis   - Duodenum with no significant histologic abnormality     C. Pancreas, uncinate process, biopsy:   - Chronic pancreatitis     D. Liver, dome lesion, needle core biopsy:   - Liver with spindle cell proliferation/lesion, acute and chronic   inflammation and fibrosis   - Focally increased IgG4 positive plasma cells (upto 60/HPF)   - See comment     E. Pancreas, head, biopsy:   - Chronic pancreatitis     F. Pancreas, tail, biopsy:   - Chronic pancreatitis     G. Pancreas, body, biopsy:   - Chronic pancreatitis with fat necrosis     COMMENT:   Sections of the liver lesion show stromal proliferation composed of   bland spindle cell bundle and whorls with associated fibrosis   infiltrated by neutrophils, lymphocytes, plasma cells and macrophages.   Occasional lymphoid aggregates are noted. The surrounding liver   parenchymal tissue show diffuse moderate portal inflammation composed of   lymphocytes and plasma cells.  There is also a mild to moderate   infiltrate of neutrophils.  Reticulin stain shows preserved reticulin   framework.  Trichrome stains highlight the spindle cell proliferation   and show mild portal fibrosis.  Immunostain are performed with   appropriate controls. The spindle cell proliferation is negative for ALK   and show patchy infiltrate of IgG4 positive plasma cells (upto 60/HPF).   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 Possibility of IgG4-related disease cannot be ruled out.   Clinical and radiologic correlation is recommended.     MRI Abdomen:4/22/2017  IMPRESSION:  1. 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  2. No significant change in small wedge-shaped peripheral areas of  persistent enhancement surrounding mildly dilated biliary radicles,  findings most compatible with cholangitis or sequela of previous  infection.  3. Nonspecific subcutaneous fluid collection in the left upper  quadrant amidst the abdominal wall postsurgical changes.    Reviewed Rheumatology lab flowsheet    MRCP 7/13/2017  IMPRESSION:  1. Findings consistent with hemosiderin deposition within the liver.  2. There is improvement in the heterogeneous enhancement seen on  comparison exam, though patchy areas of increased T2 signal and  persistent enhancement are seen in the liver suggestive of regions of  fibrosis. Mildly dilated bile ducts in the regions of the liver which  appears somewhat fibrotic, overall similar to prior exam.  3. Postsurgical changes of pancreatectomy, splenectomy and  cholecystectomy.    Lip Biopsy 5/31/2017:  FINAL DIAGNOSIS:   Lip biopsy, lower:   -  Benign minor salivary glands with normal lobular architecture, see   comment     COMMENT:   There is focal mild chronic inflammation.  There is no morphologic   evidence of IgG-4 related disease or Sjogren's disease.         Jesse Richardson, DO

## 2017-08-04 NOTE — PROGRESS NOTES
Rheumatology Clinic Visit-Fellow Note     Dinora Mcghee MRN# [id number]   YOB: 1965 Age: 52 year old     Date of Visit: 08/04/2017  Primary care provider: Justyna Lawson  Referring Provider: Dr. Ara Lugo with GI  Reason for Referral: Further evaluation and management for possible Sjogren's vs IgG4 related disease in patient with sicca symptoms and evidence of isolated? IgG4 in liver/pancreas.           Assessment and Plan:   Diagnosis:  # Sicca Symptoms  # Elevated transaminases with Hepatic Dome Lesion (improving)  # Suspicious for IgG4 related disease, IgG4 elevation in serum and on liver biopsy (> 60 HPF)  # Chronic Pancreatitis s/p pancreatectomy with islet autotransplant 12/2016.   # h/o endometriosis  # h/o pituitary mass with 2/2 DI now s/p transsphenoidal resection in 1990    Discussion:  51 y/o female with longstanding history of chronic pancreatitis s/p pancreatectomy with islet autotransplant 12/2016 who is overall improving but found to have elevated serum and > 60 HPF cells on liver biopsy staining. She has sicca symptoms for the past 5 years and this brings up the possibility of Sjogren's vs IgG4 related disease. No other evidence of IgG4 related activity with no RPF, aortitis, orbital disease, thyroid, pulmonary, or kidney disease. Interestingly, IgG4 disease can impact the hypophysis and she does have a history of pituitary issues back in 1990. Recent meta analysis assessing IgG4 serum sensitivity is 87.2% with 82% specificity (1). Her biopsy containing > 50 IgG4 staining plasma cells is highly suggestive of IgG4 related disease as well and meets recent proposed pathologic recommendations of IgG4 related disease (2). She meets many of the features for IgG4 related disease and given her largely unremarkable Sjogren's serologic evaluation we are most suspicious for IgG4 related disease as her primary etiology of her sicca symptoms. Unfortunately, there is a significant  percentage of patients who are seronegative for Sjogren's and thus further evaluation with lip biopsy is warranted.     1. Daniel Barber et al.  Diagnostic Value of Serum IgG4 for IgG4-Related Disease: A MADELYN-Compliant Systematic Review and [place]-Analysis.  Ed. Mattykelsey Quinn. Medicine 95.21 (2016): e3785. Notorious. Web. 17 May 2017.  2. Annabel V, Ever Y, Donovan JK, et al. Consensus statement on the pathology of IgG4-RD. Mod Pathol 2012;64:3061-7.    Plan:  -- recommend ENT lip Biopsy with Dian Armijo (stable symptoms no non urgent) to further stain/assess for IgG4 related disease vs Sjogren's (less likely).  -- therapeutic options for IgG4 related disease would normally be Rituximab preferred (but has steroids with infusion) vs MMF (no steroids but not ideal agent).  -- will need to coordinate with GI regarding if isolated GI manifestations or addition of sicca enough to treat vs watching given the SE of the therapies.   -- recommend Hep B core Ab and quantiferon gold in anticipation of possible Rituximab use  -- Patient deferred initiating Evoxac or restasis at this point in time.  -- discussed various lozenges that help sicca symptoms    Follow up:  RTC in 3 months, after lip biopsy and discussion with GI.    Prevention:  Pneumovax PPSV23: 12/2016  PCV13 (Prevnar): not in [facility]  Tdap: 1/7/2008?  Shingles vaccine: not recieved  Reminded if on biologic no live vaccine (flu mist, shingles): n/a  HBVsAg: negative  HBVcore: ordered  HCV: negative   Q gold (or T spot): ordered  Bactrim prophylaxis: n/a  Fungal prophylaxis: n/a  Vitamin D and calcium/bone health: Ca and Vitamin D    Immunizations:  Immunization History   Administered Date(s) Administered     HIB 12/01/2016     Influenza (IIV3) 09/29/2011, 11/15/2014, 10/21/2016     Influenza (intradermal) 11/01/2003, 09/29/2011     Influenza Vaccine IM 3yrs+ 4 Valent IIV4 10/24/2015, 10/21/2016     Influenza Vaccine, 3 YRS +, IM (QUADRIVALENT W/PRESERVATIVES) 11/04/2013      Meningococcal (Bexsero ) 12/09/2016     Meningococcal (Menactra ) 12/01/2016     Pneumococcal 23 valent 12/01/2016     TD (ADULT, 7+) 07/09/1998     TDAP Vaccine (Adacel) 01/07/2008     Discussed with attending Dr. Newman, who agrees with the above assessment and plan.    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  Document previously scanned on 1/12/17.  Validation form completed and scanned.  Code Status reflects choices in most recent ACP document..  Confirmed/documented designated decision maker(s).  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     Priority: Medium     Acute on chronic pancreatitis (H) 09/03/2016     Priority: Medium     Acute abdominal pain 08/02/2016     Priority: Medium     Abdominal pain, epigastric 10/23/2015     Priority: Medium     Severe protein-calorie malnutrition (H) 06/09/2015     Priority: Medium     Acute pancreatitis 06/03/2015     Priority: Medium     Abdominal pain 06/02/2015     Priority: Medium     S/P ERCP 06/02/2015     Priority: Medium     Abdominal pain, generalized 04/20/2015     Priority: Medium     History of ERCP 04/20/2015      Priority: Medium     Other type of intractable migraine      Priority: Medium     Diagnosis updated by automated process. Provider to review and confirm.       GERD (gastroesophageal reflux disease) 12/01/2010     Priority: Medium     Lumbago 04/18/2005     Priority: Medium     History of L5-S1 degenerative disk disease.         Sprain and strain of other specified sites of hip and thigh 12/09/2002     Priority: Medium     Chondromalacia of patella 12/09/2002     Priority: Medium     Need for prophylactic immunotherapy      Priority: Medium     trees, grass, srw, dust mites, cat       Allergic rhinitis due to other allergen 12/21/2001     Priority: Medium     Hypothyroidism      Priority: Medium     Problem list name updated by automated process. Provider to review       Sensorineural hearing loss 01/10/2005     Priority: Medium     Problem list name updated by automated process. Provider to review       Vertiginous syndrome and labyrinthine disorder 01/10/2005     Priority: Medium     Problem list name updated by automated process. Provider to review              History of Present Illness:   Dinora Mcghee is a 52 year old female with a past medical history of hypothyroidism, h/o pituitary mass and secondary DI s/p transphenoidal resection 1990, HLD, left knee OA, lumbar DDD, chronic pancreatitis for 30 years, now s/p islet cell transplantation who presents today for further evaluation of elevated IgG4 found both in serum and liver biopsy as well as sicca symptoms.     She notes that she has had longstanding issues with pancreatitis without any mention of aortitis, RPF, urinary obstruction. She denies any history of cancers, lymphoma or leukemia. No family history of autoimmune conditions other than possible RA in her dad. She had distant DI secondary from pituitary mass (s/p removal and not needing hormones) and joint wise has longstanding OA of left knee. She mentions that for the past 5 years she has  "had dry eye requiring eye drops. Sometimes related to her allergies she has to now regularly use eye drops. \"Feels like sand\" is rubbing in eyes. She also noticed ~ 5 years ago dry mouth where a cracker would not dissolve. She has to constantly carry water and drink it to keep her mouth dry. She uses sugar free gum and lozenges. Most of her clinical symptom burden has been related to her pancreas with frequent ERCP and concern for Sphincter of Oddi dysfunction. She had issues with significant pancreatic insuffiencey with malnutrition and weight loss. She underwent islet cell autotransplantation 12/28/2017 and was found to have elevated IgG4 cells in her liver biopsy. Subsequent IgG4 levels were elevated.    Interim history: 6/20/2017 - 8/4/2017             Review of Systems:   Constitutional: denies fevers/chills, fatigue, patient is having weight gain s/p transplant.  Skin: denies rash, raynauds or alopecia  Eyes: denies hx of uvetitis, double vision, positive dry eyes  Ears/Nose/Throat:  denies recurrent URI or sinusitis, positive dry mouth, denies any oral or nasal ulcers  Respiratory: No shortness of breath, dyspnea on exertion, cough, or hemoptysis  Cardiovascular:  denies chest pain, palpitations, hx pleurisy  Gastrointestinal: denies diarrhea, blood in stool, hx of IBD, positive past pancreatitis.   Genitourinary: denies hematuria. Denies any RPF.   Musculoskeletal: denies red, tender, swollen joints, left shoulder frozen shoulder since surgery.   Neurologic:  denies headaches, seizures, some numbness or tingling s/p abdominal surgery.  Psychiatric:  denies history of depression or mental illness  Hematologic/Lymphatic/Immunologic:  denies history of blood clots, easy bruising, bleeding.  Endocrine:  Positive hypothyroidism.          Past Medical History:     Past Medical History:   Diagnosis Date     Allergic rhinitis, cause unspecified      Allergy to other foods     strawberries, apples, celeries, alice, " watermelon     Arthritis     left knee     Choledocholithiasis     long after cholecystectomy     Chronic abdominal pain      Chronic constipation      Chronic nausea      Chronic pancreatitis (H)      Degeneration of lumbar or lumbosacral intervertebral disc      Esophageal reflux     w/ hiatal hernia     Gastroparesis      Hiatal hernia      History of pituitary adenoma     s/p resection     Hypothyroidism      Migraines      Mild hyperlipidemia      On tube feeding diet     presence of GJ tube     Pancreatic disease     PD stricture, 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    Past Surgical History  Past Surgical History:   Procedure Laterality Date     ABDOMEN SURGERY      c sections: 93, 96, 98. endometriosis growth     APPENDECTOMY       C  DELIVERY ONLY       C  DELIVERY ONLY      repeat c section with incidental cystotomy with repair     C EXCIS PITUITARY,TRANSNASAL/SEPTAL      pituitary tumor removed for diabetes insipidus     C TOTAL ABDOM HYSTERECTOMY      w/ bilateral salpingoophorectomy      C WRIST ARTHROSCOP,RELEASE XVERS LIG Bilateral 08      SECTION       COLONOSCOPY       ENDOSCOPIC RETROGRADE CHOLANGIOPANCREATOGRAM N/A 2015    Procedure: ENDOSCOPIC RETROGRADE CHOLANGIOPANCREATOGRAM;  Surgeon: Mandeep Park MD;  Location:  OR     ENDOSCOPIC RETROGRADE CHOLANGIOPANCREATOGRAM N/A 2016    Procedure: COMBINED ENDOSCOPIC RETROGRADE CHOLANGIOPANCREATOGRAPHY, PLACE TUBE/STENT;  Surgeon: Mandeep Park MD;  Location:  OR     ENDOSCOPIC RETROGRADE CHOLANGIOPANCREATOGRAM N/A 3/17/2016    Procedure: COMBINED ENDOSCOPIC RETROGRADE CHOLANGIOPANCREATOGRAPHY, REMOVE FOREIGN BODY OR STENT/TUBE;  Surgeon: Mandeep Park MD;  Location:  OR     ENDOSCOPIC  RETROGRADE CHOLANGIOPANCREATOGRAM N/A 8/2/2016    Procedure: COMBINED ENDOSCOPIC RETROGRADE CHOLANGIOPANCREATOGRAPHY, PLACE TUBE/STENT;  Surgeon: Mandeep Park MD;  Location: [facility]     ENDOSCOPIC RETROGRADE CHOLANGIOPANCREATOGRAM N/A 8/26/2016    Procedure: COMBINED ENDOSCOPIC RETROGRADE CHOLANGIOPANCREATOGRAPHY, REMOVE FOREIGN BODY OR STENT/TUBE;  Surgeon: Mandeep Park MD;  Location: [facility]     ENDOSCOPIC ULTRASOUND UPPER GASTROINTESTINAL TRACT (GI) N/A 10/3/2016    Procedure: ENDOSCOPIC ULTRASOUND, ESOPHAGOSCOPY / UPPER GASTROINTESTINAL TRACT (GI);  Surgeon: Guru Jose Rodas MD;  Location: [facility]     ESOPHAGOSCOPY, GASTROSCOPY, DUODENOSCOPY (EGD), COMBINED N/A 6/24/2015    Procedure: COMBINED ESOPHAGOSCOPY, GASTROSCOPY, DUODENOSCOPY (EGD), REMOVE FOREIGN BODY;  Surgeon: Mandeep Park MD;  Location: [facility]     ESOPHAGOSCOPY, GASTROSCOPY, DUODENOSCOPY (EGD), COMBINED N/A 10/25/2015    Procedure: COMBINED ESOPHAGOSCOPY, GASTROSCOPY, DUODENOSCOPY (EGD);  Surgeon: Sammy Amaro MD;  Location: [facility]     ESOPHAGOSCOPY, GASTROSCOPY, DUODENOSCOPY (EGD), COMBINED N/A 10/25/2015    Procedure: COMBINED ESOPHAGOSCOPY, GASTROSCOPY, DUODENOSCOPY (EGD), BIOPSY SINGLE OR MULTIPLE;  Surgeon: Sammy Amaro MD;  Location: [facility]     ESOPHAGOSCOPY, GASTROSCOPY, DUODENOSCOPY (EGD), DILATATION, COMBINED       EXCISE LESION TRUNK N/A 4/17/2017    Procedure: EXCISE LESION TRUNK;  Removal of Abdominal Foreign Body;  Surgeon: Nestor Phoenix MD;  [facility]: [facility]     HC ESOPH/GAS REFLUX TEST W NASAL IMPED >1 HR N/A 11/19/2015    Procedure: ESOPHAGEAL IMPEDENCE FUNCTION TEST WITH 24 HOUR PH GREATER THAN 1 HOUR;  Surgeon: Thiago Apple MD;  Location: [facility]     HC UGI ENDOSCOPY DIAG W BIOPSY  9/17/08     HC UGI ENDOSCOPY DIAG W BIOPSY  9/27/12     HC UGI ENDOSCOPY W ESOPHAGEAL DILATION BALLOON <30MM  9/17/08     HC UGI ENDOSCOPY W EUS N/A 5/5/2015    Procedure: COMBINED  ENDOSCOPIC ULTRASOUND, ESOPHAGOSCOPY, GASTROSCOPY, DUODENOSCOPY (EGD);  Surgeon: Wm Dueñas MD;  Location: [facility]     INJECT TRANSVERSUS ABDOMINIS PLANE (TAP) BLOCK BILATERAL Left 9/22/2016    Procedure: INJECT TRANSVERSUS ABDOMINIS PLANE (TAP) BLOCK BILATERAL;  Surgeon: Dickson Corrigan MD;  Location: [facility]     laparoscopic pineda  1995     LAPAROSCOPIC PANCREATECTOMY, TRANSPLANT AUTO ISLET CELL N/A 12/28/2016    Procedure: LAPAROSCOPIC PANCREATECTOMY, TRANSPLANT AUTO ISLET CELL;  Surgeon: Nestor Phoenix MD;  Location: [facility]     transphenoidal pituitary resection  1990            Social History:     Social History     Occupational History     director [employer]     Social History Main Topics     Smoking status: Former Smoker     Packs/day: 0.50     Years: 6.00     Types: Cigarettes     Start date: 9/1/1985     Quit date: 1/1/1992     Smokeless tobacco: Never Used      Comment: no 2nd hand     Alcohol use No      Comment: last drink 6/2016  - moderate social     Drug use: No     Sexual activity: Yes     Partners: Male     Birth control/ protection: None      Comment: Hystectomy 1999   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, has been on disability since 8/1/2016. 6 years of smoking and quit in 1992. Denies any ETOH.  and lives in the [facility].          Family History:     Family History   Problem Relation Age of Onset     Eye Disorder Father      cataract, detached retina     Myocardial Infarction Father 60     Lipids Father      CEREBROVASCULAR DISEASE Father      Depression Father      Substance Abuse Father      Anesthesia Reaction Father      stroke right after surgery     Lipids Mother      Hypertension Mother      Thyroid Disease Mother      Eye Disorder Son      ptosis     Eye Disorder Paternal Grandmother      cataract     Eye Disorder Paternal Grandfather      cataract     DIABETES Paternal Grandfather      Eye Disorder Maternal Grandmother      cataract     Thyroid Disease Maternal Grandmother       Coronary Artery Disease Sister      Depression Sister      Depression Son      Anxiety Disorder Son      Thyroid Disease Sister      DIABETES Maternal Grandfather      Depression Nephew      Anxiety Disorder Nephew      Thyroid Disease Nephew      Type 2 Diabetes Cousin      paternal cousin     Father with likely RA following with multiple rheumatologists. 3 children healthy. 1 sister with thyroid issues. Denies any Sjogren's SLE, Scleroderma.            Allergies:     Allergies   Allergen Reactions     Apple Anaphylaxis     Depakote [Divalproex Sodium] Other (See Comments)     Chest pain     Zithromax [Azithromycin Dihydrate] Anaphylaxis     Noted in 4/7/08 ER     Darvocet [Propoxyphene N-Apap] Itching     Morphine Nausea and Vomiting and Rash     Nalbuphine Hcl Rash     RASH :nubaine     Zosyn [Piperacillin-Tazobactam In D5w] Rash     Possible allergy, did have a diffuse rash that seemed drug related but could have also been related to soap in the hospital.      Bactrim [Sulfamethoxazole W-Trimethoprim] Other (See Comments) and Nausea and Vomiting     Severely low liver function.     Cats      Compazine [Prochlorperazine] Other (See Comments)     Twitching. Takes Benedryl and is fine     Corticosteroids Other (See Comments)     All oral,IV and injectable steroids are contraindicated (unless in life threatening situations) in Islet Auto transplant recipients. They can cause irreversible loss of islet cell function. Please contact patients transplant care coordinator Mecca ANGEL @ [phone]/Pager [phone], and Endocrinologist prior to administration.     Dust Mites      Grass      Prednisone Other (See Comments)     Insomnia       Ragweeds      Tape [Adhesive Tape] Blisters     Trees      Zofran [Ondansetron] Other (See Comments)     migraine            Medications:     Current Outpatient Prescriptions   Medication Sig Dispense Refill     ursodiol (ACTIGALL) 300 MG capsule Take 1 capsule (300 mg)  by mouth 2 times daily 60 capsule 0     metroNIDAZOLE (FLAGYL) 500 MG tablet Take 0.5 tablets (250 mg) by mouth 3 times daily 15 tablet 0     pregabalin (LYRICA) 25 MG capsule Take 3 capsules (75 mg) by mouth 2 times daily 180 capsule 0     blood glucose monitoring (PAT CONTOUR NEXT) test strip Use to test blood sugar 6-8 times daily or as directed. 200 strip 3     amylase-lipase-protease (CREON 24) 79784 UNITS CPEP per EC capsule Take 3-4 capsules by mouth with meals and 1-2 with snacks. Maximum 15 capsules per day. 450 capsule 6     insulin glargine (LANTUS) 100 UNIT/ML injection Inject 5 units subcutaneously every morning. 3 mL 3     insulin pen needle (BD KEN U/F) 32G X 4 MM Use 6-8 times per day 100 each 11     celecoxib (CELEBREX) 100 MG capsule Take 1 capsule (100 mg) by mouth 2 times daily (Patient not taking: Reported on 6/19/2017) 60 capsule 1     Nutritional Supplements (BOOST HIGH PROTEIN) LIQD After above baseline labs are drawn, give: 6 mL/kg to maximum of 360 mL; the beverage is to be consumed within 5 minutes.  0     cetirizine (ZYRTEC) 10 MG tablet Take 10 mg by mouth daily       LORazepam (ATIVAN) 0.5 MG tablet Take 1 tablet (0.5 mg) by mouth every 6 hours as needed for anxiety (Patient not taking: Reported on 6/19/2017) 20 tablet 0     Lubiprostone 8 MCG CAPS capsule Take 1 capsule (8 mcg) by mouth 2 times daily.  Take 1 capsule by mouth po BID x 5 days.  If no effect, increase to 2 capsules po BID x 5 days.  If no effect, increase to 3 capsules po BID and continue until further instruction. 90 capsule 3     insulin aspart (NOVOLOG PENFILL) 100 UNIT/ML injection Administer subcutaneously 0.5 unit per 45 grams of carbohydrates. 3 mL 3     Nutritional Supplements (BOOST HIGH PROTEIN) LIQD After above baseline labs are drawn, give: 6 mL/kg to maximum of 360 mL; the beverage is to be consumed within 5 minutes.  0     cyclobenzaprine (FLEXERIL) 5 MG tablet Take 5-10 mg by mouth three times per day  as needed for muscle spasms. 42 tablet 3     oxyCODONE (ROXICODONE) 5 MG IR tablet Take 1-2 tablets (5-10 mg) by mouth every 4 hours as needed for moderate to severe pain (Patient not taking: Reported on 6/19/2017) 150 tablet 0     insulin aspart (NOVOLOG PEN) 100 UNIT/ML injection aspart medium correction 1 unit per 50 > 130 every 4 hours    For Pre-Meal  - 179 give 1 unit.   For Pre-Meal  - 230 give 2 units.   For Pre-Meal  - 281 give 3 units.   For Pre-Meal  - 332 give 4 units.  3     amitriptyline (ELAVIL) 10 MG tablet Take 2 tablets (20 mg) by mouth At Bedtime 60 tablet 3     senna-docusate (SENOKOT-S;PERICOLACE) 8.6-50 MG per tablet Take 1 tablet by mouth 2 times daily as needed for constipation 100 tablet      prochlorperazine (COMPAZINE) 5 MG tablet Take two 5 mg tablets by mouth every six hours as needed for nausea. 90 tablet 3     polyethylene glycol (MIRALAX/GLYCOLAX) Packet Take 17 g by mouth daily as needed for constipation 7 packet 11     diphenhydrAMINE (BENADRYL ALLERGY) 25 MG capsule Take 1 capsule (25 mg) by mouth every 6 hours as needed for itching or allergies 56 capsule 0     levothyroxine (SYNTHROID/LEVOTHROID) 125 MCG tablet Take 1 tablet (125 mcg), by mouth daily before breakfast. 1 tablet 0     docusate sodium (COLACE) 100 MG capsule Take 1 capsule (100 mg) by mouth 2 times daily as needed for constipation, 60 capsule 0     multivitamin CF formula (CHOICEFUL) capsule Take 1 tablet by mouth daily  11     Blood Glucose Monitoring Suppl (CONTOUR NEXT EZ MONITOR) W/DEVICE KIT 1 Device 6 times daily       glucagon (GLUCAGON EMERGENCY) 1 MG kit Inject 1 mg into the muscle once for 1 dose 1 mg 1     LANsoprazole (PREVACID) 30 MG CR capsule Take 1 capsule (30 mg) by mouth daily Take 30-60 minutes before a meal. 30 capsule 11     aspirin 81 MG EC tablet Take 1 tablet (81 mg) by mouth daily 90 tablet 3     Injection Device for insulin (NOVOPEN ECHO) MARCO 1 each daily as needed  1 each 0     glucose 40 % GEL gel Take 15-30 g by mouth every 15 minutes as needed for low blood sugar 3 Tube 2     Sharps Container (BD SHARPS ) MISC 1 Container as needed 1 each 1     blood glucose monitoring (PAT MICROLET) lancets Use to test blood sugar 6-8 times daily or as directed. 1 Box prn            Physical Exam:   not currently breastfeeding.  Wt Readings from Last 4 Encounters:   06/20/17 67.3 kg (148 lb 5.9 oz)   06/20/17 67.3 kg (148 lb 4.8 oz)   06/19/17 69.2 kg (152 lb 8 oz)   05/20/17 68 kg (150 lb)     Ideal body weight: 63.9 kg (140 lb 14 oz)  Adjusted ideal body weight: 65.3 kg (143 lb 14 oz)    Constitutional: well-developed, appearing stated age; cooperative  Eyes: nl EOM, conjunctiva, sclera  ENT: nl external ears, nose, lips, teeth, gums, throat  No mucous membrane lesions, dry saliva pool, no parotid enlargement  Neck: no mass or thyroid enlargement  Resp: lungs clear to auscultation, nl to palpation  CV: RRR, no murmurs, rubs or gallops, no edema  GI: no ABD mass or tenderness  Lymph: no cervical, supraclavicular, epitrochlear nodes    MS: All TMJ, neck, shoulder, elbow, wrist, MCP/PIP/DIP, spine, hip, knee, ankle, and foot MTP/IP joints were examined.     No dactylitis,  tenosynovitis, enthesopathy  Soft tissue exam - No specific tenderness to palpation of soft tissue points on examination today   Neck - normal range of motion  Shoulder -  Right shoulder with normal ROM in full abduction/adduction and internal/external rotation without pain. Left shoulder with decreased abduction and IR and ER on both passive and active ROM. No glenohumeral effusion/warmth, no tenderness bilaterally. SC joints without effusion or tenderness on palpation.  Elbow - full ROM and no joint effusion on exam; nontender bilaterally   Wrist - full ROM and no joint effusion on exam; nontender bilaterally  Hand - T0S0 of bilateral DIPs, PIPs, MCP joints. Normal hand- without pain.   Back - no specific  spinal or paraspinal tenderness present  Pelvic - no tenderness along the SI joints, bilaterally.  Hip - full ROM; nontender bilaterally  Knee - no joint effusion or joint line tenderness on exam; normal range of motion.  Ankle -  Left and right ankle joint lines without swelling or tenderness. Non-tender to palpation  Foot - no focal pain or synovitis on palpation of the MTPs bilaterally    Skin: no nail pitting, alopecia, rash, nodules or lesions  Neuro: nl cranial nerves, strength in both proximal and distal UE and LE.   Psych: nl judgement, orientation, memory, affect.         Data:     Results for orders placed or performed in visit on 07/17/17   TXP External Lab Result   Result Value Ref Range    Sodium (External) 141 135 - 145 mmol/L    Potassium (External) 4.4 3.6 - 5.2 mmol/L    Chloride (External) 102 98 - 107 mmol/L    CO2 (External) 28 22 - 29 mmol/L    Anion Gap (External) 12 10 - 20 mmol/L    Alk Phosphatase (External) 290 (H) 41 - 108 U/L    Glucose (External) 109 70 - 139 mg/dl    Creatinine (External) 0.73 0.59 - 1.04 mg/dl    GFR Estimated (External) >60 >=60 ml/min/1.73 m(2)    GFR Est if Black (External) >60 >=60 ml/min/1.73 m(2)    Urea Nitrogen (External) 22 (H) 6 - 21 mg/dl    Calcium (External) 9.5 8.6 - 10.3 mg/dl    Protein Total (External) 7.1 6.3 - 7.9 g/dl    Albumin (External) 4.1 3.5 - 5.0 g/dl    AST (External) 62 (H) 8 - 43 U/L    ALT (External) 76 (H) 7 - 45 U/L    Bilirubin Total (External) 0.7 0.1 - 1.0 mg/dl    Narrative    Verified by Monica Villarreal on 07/17/2017.       Recent Labs   Lab Test  04/25/17   1355  04/04/17   0757  02/21/17   1315   12/29/16   0620   06/06/15   1903   10/23/12   1451   WBC  4.4  5.0  4.7   < >  10.7   < >   --    < >   --    RBC  4.38  4.04  4.28   < >  3.13*   < >   --    < >   --    HGB  11.1*  10.4*  11.6*   < >  9.1*   < >   --    < >   --    HCT  36.1  33.4*  37.1   < >  27.7*   < >   --    < >   --    MCV  82  83  87   < >  89   < >   --     < >   --    RDW  17.5*  15.8*  13.0   < >  13.2   < >   --    < >   --    PLT  543*  482*  733*   < >  107*   < >   --    < >   --    ALBUMIN  3.7  3.2*  3.7   < >  2.9*   < >  2.8*   < >  4.4   CRP   --    --    --    --   90.0*   --   71.0*   --   7.2   BUN  16  17  13   < >  7   < >  2*   < >   --     < > = values in this interval not displayed.      Recent Labs   Lab Test  12/20/16   1121  10/23/15   1554  10/09/13   0721   06/25/09   1423   TSH  0.13*  0.57  1.86   < >  0.37*   T4  1.17   --    --    --   1.17    < > = values in this interval not displayed.     Hemoglobin   Date Value Ref Range Status   06/20/2017 12.7 11.7 - 15.7 g/dL Final   05/20/2017 11.2 (L) 11.7 - 15.7 g/dL Final   04/25/2017 11.1 (L) 11.7 - 15.7 g/dL Final     Urea Nitrogen   Date Value Ref Range Status   06/20/2017 11 7 - 30 mg/dL Final   05/20/2017 10 7 - 30 mg/dL Final   04/25/2017 16 7 - 30 mg/dL Final     Creatinine   Date Value Ref Range Status   05/20/2017 0.6 0.52 - 1.04 mg/dL Final     Sed Rate   Date Value Ref Range Status   10/23/2012 11 0 - 20 mm/h Final   07/27/2006 9 0 - 20 mm/h Final     CRP Inflammation   Date Value Ref Range Status   12/29/2016 90.0 (H) 0.0 - 8.0 mg/L Final   06/06/2015 71.0 (H) 0.0 - 8.0 mg/L Final   10/23/2012 7.2 0.0 - 8.0 mg/L Final     AST   Date Value Ref Range Status   06/20/2017 127 (H) 0 - 45 U/L Final   05/20/2017 55 (H) 0 - 45 U/L Final   04/25/2017 58 (H) 0 - 45 U/L Final     Albumin   Date Value Ref Range Status   06/20/2017 3.7 3.4 - 5.0 g/dL Final   05/20/2017 3.5 3.4 - 5.0 g/dL Final   04/25/2017 3.7 3.4 - 5.0 g/dL Final     Alkaline Phosphatase   Date Value Ref Range Status   06/20/2017 475 (H) 40 - 150 U/L Final   05/20/2017 410 (H) 40 - 150 U/L Final   04/25/2017 363 (H) 40 - 150 U/L Final     ALT   Date Value Ref Range Status   06/20/2017 216 (H) 0 - 50 U/L Final   05/20/2017 113 (H) 0 - 50 U/L Final   04/25/2017 85 (H) 0 - 50 U/L Final     Rheumatoid Factor   Date Value Ref Range  Status   04/10/2017 <20 <20 IU/mL Final     Recent Labs   Lab Test  06/20/17   0929  05/20/17   1144  04/25/17   1355   12/20/16   1121   10/23/15   1554   10/09/13   0721   WBC  4.2  3.8*  4.4   < >   --    < >  4.8   < >   --    HGB  12.7  11.2*  11.1*   < >   --    < >  13.3   < >   --    HCT  40.1  36.3  36.1   < >   --    < >  39.1   < >   --    MCV  89  86  82   < >   --    < >  89   < >   --    PLT  447  583*  543*   < >   --    < >  212   < >   --    BUN  11  10  16   < >   --    < >  10   < >   --    TSH   --    --    --    --   0.13*   --   0.57   --   1.86   AST  127*  55*  58*   < >   --    < >  56*   < >  53*   ALT  216*  113*  85*   < >   --    < >  79*   < >  64*   ALKPHOS  475*  410*  363*   < >   --    < >  206*   < >  130    < > = values in this interval not displayed.     AMARI undetected  SSA, SSB, cryoglobulin all negative  Normal C3, C4  RF undetected  IGG subclasses with mild IGG4 elevation to 146H  SPEP with normal pattern and no monoclonal protein seen.    Recent LFT elevation from 100s -> 200s  Alk 400s.     Other studies:   Tissue biopsy 12/28/2016:  FINAL DIAGNOSIS:   A. Spleen, splenectomy:   - Splenic tissue with no significant histologic abnormality     B. Duodenum and pancreas, resection:   - Chronic pancreatitis   - Duodenum with no significant histologic abnormality     C. Pancreas, uncinate process, biopsy:   - Chronic pancreatitis     D. Liver, dome lesion, needle core biopsy:   - Liver with spindle cell proliferation/lesion, acute and chronic   inflammation and fibrosis   - Focally increased IgG4 positive plasma cells (upto 60/HPF)   - See comment     E. Pancreas, head, biopsy:   - Chronic pancreatitis     F. Pancreas, tail, biopsy:   - Chronic pancreatitis     G. Pancreas, body, biopsy:   - Chronic pancreatitis with fat necrosis     COMMENT:   Sections of the liver lesion show stromal proliferation composed of   bland spindle cell bundle and whorls with associated fibrosis    infiltrated by neutrophils, lymphocytes, plasma cells and macrophages.   Occasional lymphoid aggregates are noted. The surrounding liver   parenchymal tissue show diffuse moderate portal inflammation composed of   lymphocytes and plasma cells.  There is also a mild to moderate   infiltrate of neutrophils.  Reticulin stain shows preserved reticulin   framework.  Trichrome stains highlight the spindle cell proliferation   and show mild portal fibrosis.  Immunostain are performed with   appropriate controls. The spindle cell proliferation is negative for ALK   and show patchy infiltrate of IgG4 positive plasma cells (upto 60/HPF).   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 Possibility of IgG4-related disease cannot be ruled out.   Clinical and radiologic correlation is recommended.     MRI Abdomen:4/22/2017  IMPRESSION:  1. 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  2. No significant change in small wedge-shaped peripheral areas of  persistent enhancement surrounding mildly dilated biliary radicles,  findings most compatible with cholangitis or sequela of previous  infection.  3. Nonspecific subcutaneous fluid collection in the left upper  quadrant amidst the abdominal wall postsurgical changes.    Reviewed Rheumatology lab flowsheet

## 2017-08-04 NOTE — PROGRESS NOTES
Rheumatology Clinic Visit-Fellow Note     Dinora Mcghee MRN# [id number]   YOB: 1965 Age: 52 year old     Date of Visit: 08/04/2017  Primary care provider: Justyna Lawson  Referring Provider: Dr. Ara Lugo with GI  Reason for Referral: Further evaluation and management for possible Sjogren's vs IgG4 related disease in patient with sicca symptoms and evidence of isolated? IgG4 in liver/pancreas.           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   # h/o endometriosis  # h/o pituitary mass with 2/2 DI now s/p transsphenoidal resection in 1990    Discussion:  53 y/o female with longstanding history of chronic pancreatitis s/p pancreatectomy with islet autotransplant 12/2016 who is overall improving but found to have elevated serum and > 60 HPF cells on liver biopsy staining. She has sicca symptoms for the past 5 years and this brings up the possibility of Sjogren's vs IgG4 related disease. No other evidence of IgG4 related activity with no RPF, aortitis, orbital disease, thyroid, pulmonary, or kidney disease. Interestingly, IgG4 disease can impact the hypophysis and she does have a history of pituitary issues back in 1990. Recent meta analysis assessing IgG4 serum sensitivity is 87.2% with 82% specificity (1). Her biopsy containing > 50 IgG4 staining plasma cells is highly suggestive of IgG4 related disease as well and meets recent proposed pathologic recommendations of IgG4 related disease (2). She meets many of the features for IgG4 related disease including tissue biopsy. Unfortunately, her serologies and lip biopsy were negative for any evidence of IgG4-Related disease so at least her sicca symptoms are less likely from IgG4 RD involvement of her salivary  and/or lacrimal glands.    In regards to management of her IgG4 Related disease there are no randomized control trials and all medicines are investigational. The most recent evidence highlights that the IgG4 antibodies are pathologic and that medications that suppress B lymphocytes like CD-20 cells (Rituximab) creating the antibodies are the preferred therapy. This is especially important if the organ dysfunction can be a critical organ like the liver in a patient with recent surgeries and transplant. If her GI evaluation regarding her worsening fibrosis and involvement of liver dysfunction is thought to be caused by IgG4 related disease this adds even more impetus due to the potential irreversible damage from steroids on her auto islet cell transplant.     1. Daniel Barber et al.  Diagnostic Value of Serum IgG4 for IgG4-Related Disease: A MADELYN-Compliant Systematic Review and Rousseau-Analysis.  Ed. Lj Quinn. Medicine 95.21 (2016): e3785. [employer]. Web. 17 May 2017.  2. Annabel V, Ever Y, Donovan HOLMANK, et al. Consensus statement on the pathology of IgG4-RD. Mod Pathol 2012;64:3061-7.    Plan:  -- recheck BMP, Hepatic panel, INR, and CBC w/ diff  -- therapeutic options for IgG4 related disease would normally be Rituximab preferred (but has steroids with infusion) vs MMF (no steroids but not ideal agent).  -- do not recommend steroids at this time given the potential irreversible damage that can occur with islet cell transplants. May need to reconsider if liver dysfunction progresses.   -- appreciate GI input regarding worsening liver dysfunction and if thought to be due to IgG4 related disease then more impetus to treat given the potential severe morbidity with liver fibrosis.   -- Patient deferred initiating Evoxac or restasis at this point in time.  -- discussed various lozenges that help sicca symptoms    Follow up:  RTC in 10 weeks, unless new symptoms emerge. If needing to initiate MMF, then would want follow up 1  month after.    Prevention:  Pneumovax PPSV23: 12/2016  PCV13 (Prevnar): not in [facility]  Tdap: 1/7/2008?  Shingles vaccine: not recieved  Reminded if on biologic no live vaccine (flu mist, shingles): n/a  HBVsAg: negative  HBVcore: ordered  HCV: negative   Q gold (or T spot): ordered  Bactrim prophylaxis: n/a  Fungal prophylaxis: n/a  Vitamin D and calcium/bone health: Ca and Vitamin D    Immunizations:  Immunization History   Administered Date(s) Administered     HIB 12/01/2016     Influenza (IIV3) 09/29/2011, 11/15/2014, 10/21/2016     Influenza (intradermal) 11/01/2003, 09/29/2011     Influenza Vaccine IM 3yrs+ 4 Valent IIV4 10/24/2015, 10/21/2016     Influenza Vaccine, 3 YRS +, IM (QUADRIVALENT W/PRESERVATIVES) 11/04/2013     Meningococcal (Bexsero ) 12/09/2016     Meningococcal (Menactra ) 12/01/2016     Pneumococcal 23 valent 12/01/2016     TD (ADULT, 7+) 07/09/1998     TDAP Vaccine (Adacel) 01/07/2008     Discussed with attending Dr. Newman, who agrees with the above assessment and plan.    Jesse Richardson,   Rheumatology Fellow  Pager: [phone]    I saw this patient with the Rheumatology Fellow. I agree with the findings and recommendations.    Marlo Newman M.D.  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  Document previously scanned on 1/12/17.  Validation form completed and scanned.  Code Status reflects choices in most recent ACP document..  Confirmed/documented designated decision maker(s).  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     Priority: Medium     Acute on chronic pancreatitis (H) 09/03/2016     Priority: Medium     Acute abdominal pain 08/02/2016     Priority: Medium     Abdominal pain, epigastric 10/23/2015     Priority: Medium     Severe protein-calorie malnutrition (H) 06/09/2015     Priority: Medium     Acute pancreatitis 06/03/2015     Priority: Medium     Abdominal pain 06/02/2015     Priority: Medium     S/P ERCP 06/02/2015     Priority: Medium     Abdominal pain, generalized 04/20/2015     Priority: Medium     History of ERCP 04/20/2015     Priority: Medium     Other type of intractable migraine      Priority: Medium     Diagnosis updated by automated process. Provider to review and confirm.       GERD (gastroesophageal reflux disease) 12/01/2010     Priority: Medium     Lumbago 04/18/2005     Priority: Medium     History of L5-S1 degenerative disk disease.         Sprain and strain of other specified sites of hip and thigh 12/09/2002     Priority: Medium     Chondromalacia of patella 12/09/2002     Priority: Medium     Need for prophylactic immunotherapy      Priority: Medium     trees, grass, srw, dust mites, cat       Allergic rhinitis due to other allergen 12/21/2001     Priority: Medium     Hypothyroidism      Priority: Medium     Problem list name updated by automated process. Provider to review       Sensorineural hearing loss 01/10/2005     Priority: Medium     Problem list name updated by automated process. Provider to review       Vertiginous syndrome and labyrinthine disorder 01/10/2005     Priority: Medium     Problem list name updated by  "automated process. Provider to review              History of Present Illness:   Dinora Mcghee is a 52 year old female with a past medical history of hypothyroidism, h/o pituitary mass and secondary DI s/p transphenoidal resection 1990, HLD, left knee OA, lumbar DDD, chronic pancreatitis for 30 years, now s/p islet cell transplantation who presents today for further evaluation of elevated IgG4 found both in serum and liver biopsy as well as sicca symptoms.     She notes that she has had longstanding issues with pancreatitis without any mention of aortitis, RPF, urinary obstruction. She denies any history of cancers, lymphoma or leukemia. No family history of autoimmune conditions other than possible RA in her dad. She had distant DI secondary from pituitary mass (s/p removal and not needing hormones) and joint wise has longstanding OA of left knee. She mentions that for the past 5 years she has had dry eye requiring eye drops. Sometimes related to her allergies she has to now regularly use eye drops. \"Feels like sand\" is rubbing in eyes. She also noticed ~ 5 years ago dry mouth where a cracker would not dissolve. She has to constantly carry water and drink it to keep her mouth dry. She uses sugar free gum and lozenges. Most of her clinical symptom burden has been related to her pancreas with frequent ERCP and concern for Sphincter of Oddi dysfunction. She had issues with significant pancreatic insuffiencey with malnutrition and weight loss. She underwent islet cell autotransplantation 12/28/2017 and was found to have elevated IgG4 cells in her liver biopsy. Subsequent IgG4 levels were elevated.    Interim history: 6/20/2017 - 8/4/2017  Comes in today frustrated about the multiple denials from the insurance about Rituximab for her IgG4 Related disease. She has had recent elevations of her LFTs and mentions that the last 3 days she has started to feel worse like before her pancreatectomy. Some nausea, no " vomiting, but her appetite is lower. She denies any jaundice, but does not itching of her skin. She denies any fevers, chills, but has had 1 day of watery, nonbloody diarrhea. She had a recent MRCP with signs of biliary dilatation at ducts within the liver. She has a new hyperenhancement lesion in her liver with stability of other lesions. She is following with Dr. Lugo within the next week. She mentions that her dry eyes are getting worse.            Review of Systems:   Constitutional: denies fevers/chills, fatigue, patient is having weight gain s/p transplant.  Skin: denies rash, raynauds or alopecia  Eyes: denies hx of uvetitis, double vision, positive dry eyes  Ears/Nose/Throat:  denies recurrent URI or sinusitis, positive dry mouth, denies any oral or nasal ulcers  Respiratory: No shortness of breath, dyspnea on exertion, cough, or hemoptysis  Cardiovascular:  denies chest pain, palpitations, hx pleurisy  Gastrointestinal: denies diarrhea, blood in stool, hx of IBD, positive past pancreatitis, positive nausea, no vomiting, positive RUQ pain, decreased appetite.   Genitourinary: denies hematuria.  Musculoskeletal: denies red, tender, swollen joints, left shoulder frozen shoulder since surgery improving.  Neurologic:  denies headaches, seizures, some numbness or tingling s/p abdominal surgery.  Psychiatric:  denies history of depression or mental illness  Hematologic/Lymphatic/Immunologic:  denies history of blood clots, easy bruising, bleeding.  Endocrine:  Positive hypothyroidism.          Past Medical History:     Past Medical History:   Diagnosis Date     Allergic rhinitis, cause unspecified      Allergy to other foods     strawberries, apples, celeries, alice, watermelon     Arthritis     left knee     Choledocholithiasis     long after cholecystectomy     Chronic abdominal pain      Chronic constipation      Chronic nausea      Chronic pancreatitis (H)      Degeneration of lumbar or lumbosacral  intervertebral disc      Esophageal reflux     w/ hiatal hernia     Gastroparesis      Hiatal hernia      History of pituitary adenoma     s/p resection     Hypothyroidism      Migraines      Mild hyperlipidemia      On tube feeding diet     presence of GJ tube     Pancreatic disease     PD stricture, 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    Past Surgical History  Past Surgical History:   Procedure Laterality Date     ABDOMEN SURGERY      c sections: 93, 96, 98. endometriosis growth     APPENDECTOMY       C  DELIVERY ONLY       C  DELIVERY ONLY      repeat c section with incidental cystotomy with repair     C EXCIS PITUITARY,TRANSNASAL/SEPTAL      pituitary tumor removed for diabetes insipidus     C TOTAL ABDOM HYSTERECTOMY      w/ bilateral salpingoophorectomy      C WRIST ARTHROSCOP,RELEASE XVERS LIG Bilateral 08      SECTION       COLONOSCOPY       ENDOSCOPIC RETROGRADE CHOLANGIOPANCREATOGRAM N/A 2015    Procedure: ENDOSCOPIC RETROGRADE CHOLANGIOPANCREATOGRAM;  Surgeon: Mandeep Park MD;  Location: [facility]     ENDOSCOPIC RETROGRADE CHOLANGIOPANCREATOGRAM N/A 2016    Procedure: COMBINED ENDOSCOPIC RETROGRADE CHOLANGIOPANCREATOGRAPHY, PLACE TUBE/STENT;  Surgeon: Mandeep Park MD;  Location: [facility]     ENDOSCOPIC RETROGRADE CHOLANGIOPANCREATOGRAM N/A 3/17/2016    Procedure: COMBINED ENDOSCOPIC RETROGRADE CHOLANGIOPANCREATOGRAPHY, REMOVE FOREIGN BODY OR STENT/TUBE;  Surgeon: Mandeep Park MD;  Location: [facility]     ENDOSCOPIC RETROGRADE CHOLANGIOPANCREATOGRAM N/A 2016    Procedure: COMBINED ENDOSCOPIC RETROGRADE CHOLANGIOPANCREATOGRAPHY, PLACE TUBE/STENT;  Surgeon: Mandeep Park MD;  Location: [facility]     ENDOSCOPIC RETROGRADE CHOLANGIOPANCREATOGRAM N/A  8/26/2016    Procedure: COMBINED ENDOSCOPIC RETROGRADE CHOLANGIOPANCREATOGRAPHY, REMOVE FOREIGN BODY OR STENT/TUBE;  Surgeon: Mandeep Park MD;  Location: [facility]     ENDOSCOPIC ULTRASOUND UPPER GASTROINTESTINAL TRACT (GI) N/A 10/3/2016    Procedure: ENDOSCOPIC ULTRASOUND, ESOPHAGOSCOPY / UPPER GASTROINTESTINAL TRACT (GI);  Surgeon: Guru Jose Rodas MD;  Location: [facility]     ESOPHAGOSCOPY, GASTROSCOPY, DUODENOSCOPY (EGD), COMBINED N/A 6/24/2015    Procedure: COMBINED ESOPHAGOSCOPY, GASTROSCOPY, DUODENOSCOPY (EGD), REMOVE FOREIGN BODY;  Surgeon: Mandeep Park MD;  Location: [facility]     ESOPHAGOSCOPY, GASTROSCOPY, DUODENOSCOPY (EGD), COMBINED N/A 10/25/2015    Procedure: COMBINED ESOPHAGOSCOPY, GASTROSCOPY, DUODENOSCOPY (EGD);  Surgeon: Sammy Amaro MD;  Location: [facility]     ESOPHAGOSCOPY, GASTROSCOPY, DUODENOSCOPY (EGD), COMBINED N/A 10/25/2015    Procedure: COMBINED ESOPHAGOSCOPY, GASTROSCOPY, DUODENOSCOPY (EGD), BIOPSY SINGLE OR MULTIPLE;  Surgeon: Sammy Amaro MD;  Location: [facility]     ESOPHAGOSCOPY, GASTROSCOPY, DUODENOSCOPY (EGD), DILATATION, COMBINED       EXCISE LESION TRUNK N/A 4/17/2017    Procedure: EXCISE LESION TRUNK;  Removal of Abdominal Foreign Body;  Surgeon: Nestor Phoenix MD;  [facility]     HC ESOPH/GAS REFLUX TEST W NASAL IMPED >1 HR N/A 11/19/2015    Procedure: ESOPHAGEAL IMPEDENCE FUNCTION TEST WITH 24 HOUR PH GREATER THAN 1 HOUR;  Surgeon: Thiago Apple MD;  Location: [facility]     HC U[facility] ENDOSCOPY DIAG W BIOPSY  9/17/08     HC UGI ENDOSCOPY DIAG W BIOPSY  9/27/12     HC UGI ENDOSCOPY W ESOPHAGEAL DILATION BALLOON <30MM  9/17/08     HC UGI ENDOSCOPY W EUS N/A 5/5/2015    Procedure: COMBINED ENDOSCOPIC ULTRASOUND, ESOPHAGOSCOPY, GASTROSCOPY, DUODENOSCOPY (EGD);  Surgeon: Wm Dueñas MD;  Location: [facility]     INJECT TRANSVERSUS ABDOMINIS PLANE (TAP) BLOCK BILATERAL Left 9/22/2016    Procedure: INJECT TRANSVERSUS ABDOMINIS PLANE  (TAP) BLOCK BILATERAL;  Surgeon: Dickson Corrigan MD;  Location: [facility]     laparoscopic pineda  1995     LAPAROSCOPIC PANCREATECTOMY, TRANSPLANT AUTO ISLET CELL N/A 12/28/2016    Procedure: LAPAROSCOPIC PANCREATECTOMY, TRANSPLANT AUTO ISLET CELL;  Surgeon: Nestor Phoenix MD;  Location: [facility]     transphenoidal pituitary resection  1990            Social History:     Social History     Occupational History     director [employer]     Social History Main Topics     Smoking status: Former Smoker     Packs/day: 0.50     Years: 6.00     Types: Cigarettes     Start date: 9/1/1985     Quit date: 1/1/1992     Smokeless tobacco: Never Used      Comment: no 2nd hand     Alcohol use No      Comment: last drink 6/2016  - moderate social     Drug use: No     Sexual activity: Yes     Partners: Male     Birth control/ protection: None      Comment: Hystectomy 1999   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 6 years of smoking and quit in 1992. Denies any ETOH.  and lives in the [facility].          Family History:     Family History   Problem Relation Age of Onset     Eye Disorder Father      cataract, detached retina     Myocardial Infarction Father 60     Lipids Father      CEREBROVASCULAR DISEASE Father      Depression Father      Substance Abuse Father      Anesthesia Reaction Father      stroke right after surgery     Lipids Mother      Hypertension Mother      Thyroid Disease Mother      Eye Disorder Son      ptosis     Eye Disorder Paternal Grandmother      cataract     Eye Disorder Paternal Grandfather      cataract     DIABETES Paternal Grandfather      Eye Disorder Maternal Grandmother      cataract     Thyroid Disease Maternal Grandmother      Coronary Artery Disease Sister      Depression Sister      Depression Son      Anxiety Disorder Son      Thyroid Disease Sister      DIABETES Maternal Grandfather      Depression Nephew      Anxiety Disorder Nephew      Thyroid Disease Nephew      Type 2  Diabetes Cousin      paternal cousin     Father with likely RA following with multiple rheumatologists. 3 children healthy. 1 sister with thyroid issues. Denies any Sjogren's SLE, Scleroderma.            Allergies:     Allergies   Allergen Reactions     Apple Anaphylaxis     Depakote [Divalproex Sodium] Other (See Comments)     Chest pain     Zithromax [Azithromycin Dihydrate] Anaphylaxis     Noted in 4/7/08 ER     Darvocet [Propoxyphene N-Apap] Itching     Morphine Nausea and Vomiting and Rash     Nalbuphine Hcl Rash     RASH :nubaine     Zosyn [Piperacillin-Tazobactam In D5w] Rash     Possible allergy, did have a diffuse rash that seemed drug related but could have also been related to soap in the hospital.      Bactrim [Sulfamethoxazole W-Trimethoprim] Other (See Comments) and Nausea and Vomiting     Severely low liver function.     Cats      Compazine [Prochlorperazine] Other (See Comments)     Twitching. Takes Benedryl and is fine     Corticosteroids Other (See Comments)     All oral,IV and injectable steroids are contraindicated (unless in life threatening situations) in Islet Auto transplant recipients. They can cause irreversible loss of islet cell function. Please contact patients transplant care coordinator Mecca ANGEL @ [phone]/Pager [phone], and Endocrinologist prior to administration.     Dust Mites      Grass      Prednisone Other (See Comments)     Insomnia       Ragweeds      Tape [Adhesive Tape] Blisters     Trees      Zofran [Ondansetron] Other (See Comments)     migraine            Medications:     Current Outpatient Prescriptions   Medication Sig Dispense Refill     ursodiol (ACTIGALL) 300 MG capsule Take 1 capsule (300 mg) by mouth 2 times daily 60 capsule 0     metroNIDAZOLE (FLAGYL) 500 MG tablet Take 0.5 tablets (250 mg) by mouth 3 times daily 15 tablet 0     pregabalin (LYRICA) 25 MG capsule Take 3 capsules (75 mg) by mouth 2 times daily 180 capsule 0     blood glucose  monitoring (PAT CONTOUR NEXT) test strip Use to test blood sugar 6-8 times daily or as directed. 200 strip 3     amylase-lipase-protease (CREON 24) 25438 UNITS CPEP per EC capsule Take 3-4 capsules by mouth with meals and 1-2 with snacks. Maximum 15 capsules per day. 450 capsule 6     insulin glargine (LANTUS) 100 UNIT/ML injection Inject 5 units subcutaneously every morning. 3 mL 3     insulin pen needle (BD KEN U/F) 32G X 4 MM Use 6-8 times per day 100 each 11     celecoxib (CELEBREX) 100 MG capsule Take 1 capsule (100 mg) by mouth 2 times daily (Patient not taking: Reported on 6/19/2017) 60 capsule 1     Nutritional Supplements (BOOST HIGH PROTEIN) LIQD After above baseline labs are drawn, give: 6 mL/kg to maximum of 360 mL; the beverage is to be consumed within 5 minutes.  0     cetirizine (ZYRTEC) 10 MG tablet Take 10 mg by mouth daily       LORazepam (ATIVAN) 0.5 MG tablet Take 1 tablet (0.5 mg) by mouth every 6 hours as needed for anxiety (Patient not taking: Reported on 6/19/2017) 20 tablet 0     Lubiprostone 8 MCG CAPS capsule Take 1 capsule (8 mcg) by mouth 2 times daily.  Take 1 capsule by mouth po BID x 5 days.  If no effect, increase to 2 capsules po BID x 5 days.  If no effect, increase to 3 capsules po BID and continue until further instruction. 90 capsule 3     insulin aspart (NOVOLOG PENFILL) 100 UNIT/ML injection Administer subcutaneously 0.5 unit per 45 grams of carbohydrates. 3 mL 3     Nutritional Supplements (BOOST HIGH PROTEIN) LIQD After above baseline labs are drawn, give: 6 mL/kg to maximum of 360 mL; the beverage is to be consumed within 5 minutes.  0     cyclobenzaprine (FLEXERIL) 5 MG tablet Take 5-10 mg by mouth three times per day as needed for muscle spasms. 42 tablet 3     oxyCODONE (ROXICODONE) 5 MG IR tablet Take 1-2 tablets (5-10 mg) by mouth every 4 hours as needed for moderate to severe pain (Patient not taking: Reported on 6/19/2017) 150 tablet 0     insulin aspart (NOVOLOG  PEN) 100 UNIT/ML injection aspart medium correction 1 unit per 50 > 130 every 4 hours    For Pre-Meal  - 179 give 1 unit.   For Pre-Meal  - 230 give 2 units.   For Pre-Meal  - 281 give 3 units.   For Pre-Meal  - 332 give 4 units.  3     amitriptyline (ELAVIL) 10 MG tablet Take 2 tablets (20 mg) by mouth At Bedtime 60 tablet 3     senna-docusate (SENOKOT-S;PERICOLACE) 8.6-50 MG per tablet Take 1 tablet by mouth 2 times daily as needed for constipation 100 tablet      prochlorperazine (COMPAZINE) 5 MG tablet Take two 5 mg tablets by mouth every six hours as needed for nausea. 90 tablet 3     polyethylene glycol (MIRALAX/GLYCOLAX) Packet Take 17 g by mouth daily as needed for constipation 7 packet 11     diphenhydrAMINE (BENADRYL ALLERGY) 25 MG capsule Take 1 capsule (25 mg) by mouth every 6 hours as needed for itching or allergies 56 capsule 0     levothyroxine (SYNTHROID/LEVOTHROID) 125 MCG tablet Take 1 tablet (125 mcg), by mouth daily before breakfast. 1 tablet 0     docusate sodium (COLACE) 100 MG capsule Take 1 capsule (100 mg) by mouth 2 times daily as needed for constipation, 60 capsule 0     multivitamin CF formula (CHOICEFUL) capsule Take 1 tablet by mouth daily  11     Blood Glucose Monitoring Suppl (CONTOUR NEXT EZ MONITOR) W/DEVICE KIT 1 Device 6 times daily       glucagon (GLUCAGON EMERGENCY) 1 MG kit Inject 1 mg into the muscle once for 1 dose 1 mg 1     LANsoprazole (PREVACID) 30 MG CR capsule Take 1 capsule (30 mg) by mouth daily Take 30-60 minutes before a meal. 30 capsule 11     aspirin 81 MG EC tablet Take 1 tablet (81 mg) by mouth daily 90 tablet 3     Injection Device for insulin (NOVOPEN ECHO) MARCO 1 each daily as needed 1 each 0     glucose 40 % GEL gel Take 15-30 g by mouth every 15 minutes as needed for low blood sugar 3 Tube 2     Sharps Container (BD SHARPS ) MISC 1 Container as needed 1 each 1     blood glucose monitoring (PAT MICROLET) lancets Use to test  blood sugar 6-8 times daily or as directed. 1 Box prn            Physical Exam:   not currently breastfeeding.  Wt Readings from Last 4 Encounters:   06/20/17 67.3 kg (148 lb 5.9 oz)   06/20/17 67.3 kg (148 lb 4.8 oz)   06/19/17 69.2 kg (152 lb 8 oz)   05/20/17 68 kg (150 lb)     Ideal body weight: 63.9 kg (140 lb 14 oz)  Adjusted ideal body weight: 65.3 kg (143 lb 14 oz)    Constitutional: well-developed, appearing stated age; cooperative  Eyes: nl EOM, conjunctiva, sclera. No icterus.   ENT: nl external ears, nose, lips, teeth, gums, throat  No mucous membrane lesions, dry saliva pool, no parotid enlargement  Neck: no mass or thyroid enlargement  Resp: lungs clear to auscultation, nl to palpation  CV: RRR, no murmurs, rubs or gallops, no edema  GI: no ABD mass but new RUQ tenderness with palpation as well as rebound tenderness.   Lymph: no cervical, supraclavicular, epitrochlear nodes    MS: All TMJ, neck, shoulder, elbow, wrist, MCP/PIP/DIP, spine, hip, knee, ankle, and foot MTP/IP joints were examined.     No dactylitis,  tenosynovitis, enthesopathy  Soft tissue exam - No specific tenderness to palpation of soft tissue points on examination today   Neck - normal range of motion  Shoulder -  Right shoulder with normal ROM in full abduction/adduction and internal/external rotation without pain. Left shoulder with decreased abduction and IR and ER on both passive and active ROM. No glenohumeral effusion/warmth, no tenderness bilaterally. SC joints without effusion or tenderness on palpation.  Elbow - full ROM and no joint effusion on exam; nontender bilaterally   Wrist - full ROM and no joint effusion on exam; nontender bilaterally  Hand - T0S0 of bilateral DIPs, PIPs, MCP joints. Normal hand- without pain.   Back - no specific spinal or paraspinal tenderness present  Pelvic - no tenderness along the SI joints, bilaterally.  Hip - full ROM; nontender bilaterally  Knee - no joint effusion or joint line  tenderness on exam; normal range of motion.  Ankle -  Left and right ankle joint lines without swelling or tenderness. Non-tender to palpation  Foot - no focal pain or synovitis on palpation of the MTPs bilaterally    Skin: no nail pitting, alopecia, rash, nodules or lesions  Neuro: nl cranial nerves, strength in both proximal and distal UE and LE.   Psych: nl judgement, orientation, memory, affect.         Data:     Results for orders placed or performed in visit on 07/17/17   TXP External Lab Result   Result Value Ref Range    Sodium (External) 141 135 - 145 mmol/L    Potassium (External) 4.4 3.6 - 5.2 mmol/L    Chloride (External) 102 98 - 107 mmol/L    CO2 (External) 28 22 - 29 mmol/L    Anion Gap (External) 12 10 - 20 mmol/L    Alk Phosphatase (External) 290 (H) 41 - 108 U/L    Glucose (External) 109 70 - 139 mg/dl    Creatinine (External) 0.73 0.59 - 1.04 mg/dl    GFR Estimated (External) >60 >=60 ml/min/1.73 m(2)    GFR Est if Black (External) >60 >=60 ml/min/1.73 m(2)    Urea Nitrogen (External) 22 (H) 6 - 21 mg/dl    Calcium (External) 9.5 8.6 - 10.3 mg/dl    Protein Total (External) 7.1 6.3 - 7.9 g/dl    Albumin (External) 4.1 3.5 - 5.0 g/dl    AST (External) 62 (H) 8 - 43 U/L    ALT (External) 76 (H) 7 - 45 U/L    Bilirubin Total (External) 0.7 0.1 - 1.0 mg/dl    Narrative    Verified by Monica Villarreal on 07/17/2017.       Recent Labs   Lab Test  04/25/17   1355  04/04/17   0757  02/21/17   1315   12/29/16   0620   06/06/15   1903   10/23/12   1451   WBC  4.4  5.0  4.7   < >  10.7   < >   --    < >   --    RBC  4.38  4.04  4.28   < >  3.13*   < >   --    < >   --    HGB  11.1*  10.4*  11.6*   < >  9.1*   < >   --    < >   --    HCT  36.1  33.4*  37.1   < >  27.7*   < >   --    < >   --    MCV  82  83  87   < >  89   < >   --    < >   --    RDW  17.5*  15.8*  13.0   < >  13.2   < >   --    < >   --    PLT  543*  482*  733*   < >  107*   < >   --    < >   --    ALBUMIN  3.7  3.2*  3.7   < >  2.9*   <  >  2.8*   < >  4.4   CRP   --    --    --    --   90.0*   --   71.0*   --   7.2   BUN  16  17  13   < >  7   < >  2*   < >   --     < > = values in this interval not displayed.      Recent Labs   Lab Test  12/20/16   1121  10/23/15   1554  10/09/13   0721   06/25/09   1423   TSH  0.13*  0.57  1.86   < >  0.37*   T4  1.17   --    --    --   1.17    < > = values in this interval not displayed.     Hemoglobin   Date Value Ref Range Status   06/20/2017 12.7 11.7 - 15.7 g/dL Final   05/20/2017 11.2 (L) 11.7 - 15.7 g/dL Final   04/25/2017 11.1 (L) 11.7 - 15.7 g/dL Final     Urea Nitrogen   Date Value Ref Range Status   06/20/2017 11 7 - 30 mg/dL Final   05/20/2017 10 7 - 30 mg/dL Final   04/25/2017 16 7 - 30 mg/dL Final     Creatinine   Date Value Ref Range Status   05/20/2017 0.6 0.52 - 1.04 mg/dL Final     Sed Rate   Date Value Ref Range Status   10/23/2012 11 0 - 20 mm/h Final   07/27/2006 9 0 - 20 mm/h Final     CRP Inflammation   Date Value Ref Range Status   12/29/2016 90.0 (H) 0.0 - 8.0 mg/L Final   06/06/2015 71.0 (H) 0.0 - 8.0 mg/L Final   10/23/2012 7.2 0.0 - 8.0 mg/L Final     AST   Date Value Ref Range Status   06/20/2017 127 (H) 0 - 45 U/L Final   05/20/2017 55 (H) 0 - 45 U/L Final   04/25/2017 58 (H) 0 - 45 U/L Final     Albumin   Date Value Ref Range Status   06/20/2017 3.7 3.4 - 5.0 g/dL Final   05/20/2017 3.5 3.4 - 5.0 g/dL Final   04/25/2017 3.7 3.4 - 5.0 g/dL Final     Alkaline Phosphatase   Date Value Ref Range Status   06/20/2017 475 (H) 40 - 150 U/L Final   05/20/2017 410 (H) 40 - 150 U/L Final   04/25/2017 363 (H) 40 - 150 U/L Final     ALT   Date Value Ref Range Status   06/20/2017 216 (H) 0 - 50 U/L Final   05/20/2017 113 (H) 0 - 50 U/L Final   04/25/2017 85 (H) 0 - 50 U/L Final     Rheumatoid Factor   Date Value Ref Range Status   04/10/2017 <20 <20 IU/mL Final     Recent Labs   Lab Test  06/20/17   0929  05/20/17   1144  04/25/17   1355   12/20/16   1121   10/23/15   1554   10/09/13   0721   WBC   4.2  3.8*  4.4   < >   --    < >  4.8   < >   --    HGB  12.7  11.2*  11.1*   < >   --    < >  13.3   < >   --    HCT  40.1  36.3  36.1   < >   --    < >  39.1   < >   --    MCV  89  86  82   < >   --    < >  89   < >   --    PLT  447  583*  543*   < >   --    < >  212   < >   --    BUN  11  10  16   < >   --    < >  10   < >   --    TSH   --    --    --    --   0.13*   --   0.57   --   1.86   AST  127*  55*  58*   < >   --    < >  56*   < >  53*   ALT  216*  113*  85*   < >   --    < >  79*   < >  64*   ALKPHOS  475*  410*  363*   < >   --    < >  206*   < >  130    < > = values in this interval not displayed.     AMARI undetected  SSA, SSB, cryoglobulin all negative  Normal C3, C4  RF undetected  IGG subclasses with mild IGG4 elevation to 146H  SPEP with normal pattern and no monoclonal protein seen.    Recent LFT elevation from 100s -> 200s  Alk 400s.     Other studies:   Tissue biopsy 12/28/2016:  FINAL DIAGNOSIS:   A. Spleen, splenectomy:   - Splenic tissue with no significant histologic abnormality     B. Duodenum and pancreas, resection:   - Chronic pancreatitis   - Duodenum with no significant histologic abnormality     C. Pancreas, uncinate process, biopsy:   - Chronic pancreatitis     D. Liver, dome lesion, needle core biopsy:   - Liver with spindle cell proliferation/lesion, acute and chronic   inflammation and fibrosis   - Focally increased IgG4 positive plasma cells (upto 60/HPF)   - See comment     E. Pancreas, head, biopsy:   - Chronic pancreatitis     F. Pancreas, tail, biopsy:   - Chronic pancreatitis     G. Pancreas, body, biopsy:   - Chronic pancreatitis with fat necrosis     COMMENT:   Sections of the liver lesion show stromal proliferation composed of   bland spindle cell bundle and whorls with associated fibrosis   infiltrated by neutrophils, lymphocytes, plasma cells and macrophages.   Occasional lymphoid aggregates are noted. The surrounding liver   parenchymal tissue show diffuse moderate  portal inflammation composed of   lymphocytes and plasma cells.  There is also a mild to moderate   infiltrate of neutrophils.  Reticulin stain shows preserved reticulin   framework.  Trichrome stains highlight the spindle cell proliferation   and show mild portal fibrosis.  Immunostain are performed with   appropriate controls. The spindle cell proliferation is negative for ALK   and show patchy infiltrate of IgG4 positive plasma cells (upto 60/HPF).   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 Possibility of IgG4-related disease cannot be ruled out.   Clinical and radiologic correlation is recommended.     MRI Abdomen:4/22/2017  IMPRESSION:  1. 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  2. No significant change in small wedge-shaped peripheral areas of  persistent enhancement surrounding mildly dilated biliary radicles,  findings most compatible with cholangitis or sequela of previous  infection.  3. Nonspecific subcutaneous fluid collection in the left upper  quadrant amidst the abdominal wall postsurgical changes.    Reviewed Rheumatology lab flowsheet    MRCP 7/13/2017  IMPRESSION:  1. Findings consistent with hemosiderin deposition within the liver.  2. There is improvement in the heterogeneous enhancement seen on  comparison exam, though patchy areas of increased T2 signal and  persistent enhancement are seen in the liver suggestive of regions of  fibrosis. Mildly dilated bile ducts in the regions of the liver which  appears somewhat fibrotic, overall similar to prior exam.  3. Postsurgical changes of pancreatectomy, splenectomy and  cholecystectomy.    Lip Biopsy 5/31/2017:  FINAL DIAGNOSIS:   Lip biopsy, lower:   -  Benign minor salivary glands with normal lobular architecture, see   comment     COMMENT:   There is focal mild chronic inflammation.  There is no morphologic   evidence of IgG-4 related disease or Sjogren's disease.

## 2017-08-07 ENCOUNTER — TELEPHONE (OUTPATIENT)
Dept: ANESTHESIOLOGY | Facility: CLINIC | Age: 52
End: 2017-08-07

## 2017-08-08 ENCOUNTER — OFFICE VISIT (OUTPATIENT)
Dept: ANESTHESIOLOGY | Facility: CLINIC | Age: 52
End: 2017-08-08

## 2017-08-08 VITALS
SYSTOLIC BLOOD PRESSURE: 113 MMHG | OXYGEN SATURATION: 99 % | DIASTOLIC BLOOD PRESSURE: 75 MMHG | HEIGHT: 68 IN | HEART RATE: 90 BPM | WEIGHT: 147 LBS | BODY MASS INDEX: 22.28 KG/M2

## 2017-08-08 DIAGNOSIS — M75.102 TEAR OF LEFT ROTATOR CUFF, UNSPECIFIED TEAR EXTENT: Primary | ICD-10-CM

## 2017-08-08 DIAGNOSIS — R10.9 CHRONIC ABDOMINAL PAIN: ICD-10-CM

## 2017-08-08 DIAGNOSIS — G89.29 CHRONIC ABDOMINAL PAIN: ICD-10-CM

## 2017-08-08 RX ORDER — CELECOXIB 100 MG/1
100 CAPSULE ORAL 2 TIMES DAILY
Qty: 60 CAPSULE | Refills: 1 | Status: SHIPPED | OUTPATIENT
Start: 2017-08-08 | End: 2017-11-06

## 2017-08-08 ASSESSMENT — ENCOUNTER SYMPTOMS
DIZZINESS: 0
SORE THROAT: 1
NERVOUS/ANXIOUS: 1
HALLUCINATIONS: 0
DEPRESSION: 0
DIARRHEA: 1
WEIGHT LOSS: 0
SEIZURES: 0
ABDOMINAL PAIN: 1
MEMORY LOSS: 1
MYALGIAS: 1
POLYDIPSIA: 0
FEVER: 0
EYE PAIN: 1
HEARTBURN: 0
TINGLING: 1
CHILLS: 0
NECK PAIN: 1
LOSS OF CONSCIOUSNESS: 0
WEAKNESS: 0
SPEECH CHANGE: 0
EYE REDNESS: 1
TREMORS: 0
CONSTIPATION: 1
INSOMNIA: 1
BLOOD IN STOOL: 0
NAUSEA: 1
DOUBLE VISION: 0
HEADACHES: 1
VOMITING: 0
BACK PAIN: 1

## 2017-08-08 ASSESSMENT — PAIN SCALES - GENERAL: PAINLEVEL: MILD PAIN (3)

## 2017-08-08 NOTE — MR AVS SNAPSHOT
After Visit Summary   8/8/2017    Dinora Mcghee    MRN: [id number]           Patient Information     Date Of Birth          1965        Visit Information        Provider Department      8/8/2017 2:20 PM Martinez Arteaga MD [facility]        Today's Diagnoses     Acute post-operative pain          Care Instructions    1. Your doctor has ordered a physical therapy referral today for Tens unit If the physical therapy department does not contact you 2 business days call [phone] to schedule your appointment.     2. Schedule a left suprascapular nerve block if shoulder pain returns    Follow up:  6 months    To speak with a nurse, schedule/reschedule/cancel a clinic appointment, or request a medication refill call: [phone]     You can also reach us by [employer]: [email or web address]    For refills, please call on Monday, 1 week before your medication runs out. The doctors are not always in clinic, so this gives us time to get your prescriptions ready.  Please let us know the name of the medication you are requesting a refill of.                                   Follow-ups after your visit        Additional Services     PHYSICAL THERAPY REFERRAL       *This therapy referral will be filtered to a centralized scheduling office at [facility] and the patient will receive a call to schedule an appointment at a [facility] location most convenient for them. *     [facility] provides Physical Therapy evaluation and treatment and many specialty services across the [facility] system.  If requesting a specialty program, please choose from the list below.    If you have not heard from the scheduling office within 2 business days, please call [phone] for all locations, with the exception of [place], please call [phone].  Treatment: Evaluation & Treatment  Special Instructions/Modalities: Tens  "unit  Special Programs: {Tens unit evaluation and treatment    Please be aware that coverage of these services is subject to the terms and limitations of your health insurance plan.  Call member services at your health plan with any benefit or coverage questions.      **Note to Provider:  If you are referring outside of [place] for the therapy appointment, please list the name of the location in the \"special instructions\" above, print the referral and give to the patient to schedule the appointment.                  Your next 10 appointments already scheduled     Aug 14, 2017  7:45 AM CDT   Lab with  LAB   [facility] ([facility])    [address] [place] [address] [id number] [phone]            Aug 14, 2017  8:50 AM CDT   (Arrive by 8:35 AM)   Return General Liver with Ara Lugo MD   [facility] Hepatology ([facility])    [address] [place] [address] [id number] [phone]            Oct 20, 2017  9:00 AM CDT   (Arrive by 8:45 AM)   Return Visit with Jesse Richardson DO   [facility] Rheumatology ([facility])    [address] [place] [id number] [phone]              Who to contact     Please call your clinic at [phone] to:    Ask questions about your health    Make or cancel appointments    Discuss your medicines    Learn about your test results    Speak to your doctor   If you have compliments or concerns about an experience at your clinic, or if you wish to file a complaint, please contact [facility] Patient Relations at [phone] or email us at [email or web address]         Additional Information About Your Visit        MyChart Information     [employer] gives you secure access to your electronic health record. If you see a primary care provider, you can also send messages to your care team and make " "appointments. If you have questions, please call your primary care clinic.  If you do not have a primary care provider, please call [phone] and they will assist you.      [employer] is an electronic gateway that provides easy, online access to your medical records. With [employer], you can request a clinic appointment, read your test results, renew a prescription or communicate with your care team.     To access your existing account, please contact your [facility] or call [phone] for assistance.        Care EveryWhere ID     This is your Care EveryWhere ID. This could be used by other organizations to access your [facility] medical records  [id number]        Your Vitals Were     Pulse Height Pulse Oximetry BMI (Body Mass Index)          90 1.727 m (5' 8\") 99% 22.35 kg/m2         Blood Pressure from Last 3 Encounters:   08/08/17 113/75   08/04/17 103/69   06/20/17 102/71    Weight from Last 3 Encounters:   08/08/17 66.7 kg (147 lb)   08/04/17 67.2 kg (148 lb 3.2 oz)   06/20/17 67.3 kg (148 lb 5.9 oz)              We Performed the Following     PHYSICAL THERAPY REFERRAL          Where to get your medicines      These medications were sent to [id number] - [place], [place] - [address]  [address] [facility] [address]     Phone:  [phone]     celecoxib 100 MG capsule          Primary Care Provider Office Phone # Fax #    Justyna Lawson [phone]       [address]  [facility] [address]        Equal Access to Services     [facility]: Hadii aad ku hadasho Soomaali, waaxda luqadaha, qaybta kaalmada adeeggold, ivan wallace . So [facility] [phone].    ATENCIÓN: Si habla español, tiene a muñoz disposición servicios gratuitos de asistencia lingüística. Llame al [phone].    We comply with applicable federal civil rights laws and [place] laws. We do not discriminate on the basis of race, " color, national origin, age, disability sex, sexual orientation or gender identity.            Thank you!     Thank you for choosing [facility]  for your care. Our goal is always to provide you with excellent care. Hearing back from our patients is one way we can continue to improve our services. Please take a few minutes to complete the written survey that you may receive in the mail after your visit with us. Thank you!             Your Updated Medication List - Protect others around you: Learn how to safely use, store and throw away your medicines at www.disposemymeds.org.          This list is accurate as of: 8/8/17  3:31 PM.  Always use your most recent med list.                   Brand Name Dispense Instructions for use Diagnosis    amitriptyline 10 MG tablet    ELAVIL    60 tablet    Take 2 tablets (20 mg) by mouth At Bedtime    Itching, Post-pancreatectomy diabetes (H), History of pancreatectomy, Pancreatic insufficiency       amylase-lipase-protease 92678 UNITS Cpep per EC capsule    CREON 24    450 capsule    Take 3-4 capsules by mouth with meals and 1-2 with snacks. Maximum 15 capsules per day.    Acquired total absence of pancreas       aspirin 81 MG EC tablet     90 tablet    Take 1 tablet (81 mg) by mouth daily    High platelet count (H)       BD SHARPS  Misc     1 each    1 Container as needed    Post-pancreatectomy diabetes (H)       blood glucose monitoring lancets     1 Box    Use to test blood sugar 6-8 times daily or as directed.    Acute on chronic pancreatitis (H)       blood glucose monitoring test strip    PAT CONTOUR NEXT    200 strip    Use to test blood sugar 6-8 times daily or as directed.    Acute on chronic pancreatitis (H), Post-pancreatectomy diabetes (H)       celecoxib 100 MG capsule    celeBREX    60 capsule    Take 1 capsule (100 mg) by mouth 2 times daily    Acute post-operative pain       cetirizine 10 MG tablet    zyrTEC     Take  10 mg by mouth daily    Elevated liver enzymes       CONTOUR NEXT EZ MONITOR W/DEVICE Kit      1 Device 6 times daily    Itching, Post-pancreatectomy diabetes (H), History of pancreatectomy, Pancreatic insufficiency       diphenhydrAMINE 25 MG capsule    BENADRYL ALLERGY    56 capsule    Take 1 capsule (25 mg) by mouth every 6 hours as needed for itching or allergies    Itching, Post-pancreatectomy diabetes (H), History of pancreatectomy, Pancreatic insufficiency       docusate sodium 100 MG capsule    COLACE    60 capsule    Take 1 capsule (100 mg) by mouth 2 times daily as needed for constipation,    Itching, Post-pancreatectomy diabetes (H), History of pancreatectomy, Pancreatic insufficiency       glucagon 1 MG kit    GLUCAGON EMERGENCY    1 mg    Inject 1 mg into the muscle once for 1 dose    Post-pancreatectomy diabetes (H)       glucose 40 % Gel gel     3 Tube    Take 15-30 g by mouth every 15 minutes as needed for low blood sugar    Acquired total absence of pancreas       Injection Device for insulin Tika    NOVOPEN ECHO    1 each    1 each daily as needed    Post-pancreatectomy diabetes (H)       * insulin aspart 100 UNIT/ML injection    NovoLOG PEN     aspart medium correction 1 unit per 50 > 130 every 4 hours ? For Pre-Meal  - 179 give 1 unit.  For Pre-Meal  - 230 give 2 units.  For Pre-Meal  - 281 give 3 units.  For Pre-Meal  - 332 give 4 units.    Acquired total absence of pancreas       * insulin aspart 100 UNIT/ML injection    NovoLOG PENFILL    3 mL    Administer subcutaneously 0.5 unit per 45 grams of carbohydrates.    Post-pancreatectomy diabetes (H)       insulin glargine 100 UNIT/ML injection    LANTUS    3 mL    Inject 5 units subcutaneously every morning.    Post-pancreatectomy diabetes (H)       insulin pen needle 32G X 4 MM    BD KEN U/F    100 each    Use 6-8 times per day    Acquired total absence of pancreas       LANsoprazole 30 MG CR capsule    PREVACID    30  capsule    Take 1 capsule (30 mg) by mouth daily Take 30-60 minutes before a meal.    Post-pancreatectomy diabetes (H), Pancreatic insufficiency       levothyroxine 125 MCG tablet    SYNTHROID/LEVOTHROID    1 tablet    Take 1 tablet (125 mcg), by mouth daily before breakfast.    Itching, Post-pancreatectomy diabetes (H), History of pancreatectomy, Pancreatic insufficiency       Lubiprostone 8 MCG Caps capsule     90 capsule    Take 1 capsule (8 mcg) by mouth 2 times daily. Take 1 capsule by mouth po BID x 5 days. If no effect, increase to 2 capsules po BID x 5 days. If no effect, increase to 3 capsules po BID and continue until further instruction.    Chronic constipation       metroNIDAZOLE 500 MG tablet    FLAGYL    15 tablet    Take 0.5 tablets (250 mg) by mouth 3 times daily    Bacterial overgrowth syndrome       multivitamin CF formula capsule    CHOICEFUL     Take 1 tablet by mouth daily    Itching, Post-pancreatectomy diabetes (H), History of pancreatectomy, Pancreatic insufficiency       oxyCODONE 5 MG IR tablet    ROXICODONE    150 tablet    Take 1-2 tablets (5-10 mg) by mouth every 4 hours as needed for moderate to severe pain    Acute post-operative pain       polyethylene glycol Packet    MIRALAX/GLYCOLAX    7 packet    Take 17 g by mouth daily as needed for constipation    Itching, Post-pancreatectomy diabetes (H), History of pancreatectomy, Pancreatic insufficiency       prochlorperazine 5 MG tablet    COMPAZINE    90 tablet    Take two 5 mg tablets by mouth every six hours as needed for nausea.    Itching, Post-pancreatectomy diabetes (H), History of pancreatectomy, Pancreatic insufficiency       senna-docusate 8.6-50 MG per tablet    SENOKOT-S;PERICOLACE    100 tablet    Take 1 tablet by mouth 2 times daily as needed for constipation    Itching, Post-pancreatectomy diabetes (H), History of pancreatectomy, Pancreatic insufficiency       ursodiol 300 MG capsule    ACTIGALL    60 capsule    Take 1  capsule (300 mg) by mouth 2 times daily    Elevated LFTs       * Notice:  This list has 2 medication(s) that are the same as other medications prescribed for you. Read the directions carefully, and ask your doctor or other care provider to review them with you.

## 2017-08-08 NOTE — LETTER
8/8/2017       RE: Dinora Mcghee  [address] [facility] [address]     Dear Colleague,    Thank you for referring your patient, Dinora Mcghee, to the [facility] at [facility]. Please see a copy of my visit note below.    [facility] : Consultation Note    Patient: Dinora Mcghee Age: 52 year old   MRN: [id number] Referred by:  Patient     Date of Visit: August 8, 2017    Reason for consultation:    Dinora Mcghee is a 52 year old female who is seen in consultation today at the request of her provider,Dr. Verde for a comprehensive evaluation and management of pain.  Primary Care Provider is Justyna Lawson.  Opiate pain medications are being prescribed by - none.    Chief complaints:  Left shoulder pain and abdominal pain    History of Present illness:   The patient has a medical history significant for  chronic pancreatitis s/p pancreatectomy on 4/17/17. She still reports chronic abdominal pain of unknown etiology. In addition, she reports shoulder pain secondary to rotator cuff tear. She has seen my colleague Dr. Soto  for multifocal pain syndromes. She is currently taking Celebrex 200 mg qd. Aspercreme. Her pain is stable. She came today to establish care with me. The patient denies any side effects associated with medications, including hypersomnolence, cognitive changes, pruritis, or constipation. The patients pain is not associated with sensations of numbness and tingling or with weakness with prolonged weight bearing and walking.     [place] Prescription Monitoring Program:   Reviewed. No concerns    Review of Systems:  Review of Systems   Constitutional: Negative for chills, fever and weight loss.   HENT: Positive for ear discharge, ear pain and sore throat. Negative for hearing loss, nosebleeds and tinnitus.    Eyes: Positive  for pain and redness. Negative for double vision.   Gastrointestinal: Positive for abdominal pain, constipation, diarrhea and nausea. Negative for blood in stool, heartburn, melena and vomiting.   Musculoskeletal: Positive for back pain, myalgias and neck pain.   Neurological: Positive for tingling and headaches. Negative for dizziness, tremors, speech change, seizures, loss of consciousness and weakness.   Endo/Heme/Allergies: Negative for polydipsia.   Psychiatric/Behavioral: Positive for memory loss. Negative for depression and hallucinations. The patient is nervous/anxious and has insomnia.        Past Medical History:  Past Medical History:   Diagnosis Date     Allergic rhinitis, cause unspecified      Allergy to other foods     strawberries, apples, celeries, alice, watermelon     Arthritis     left knee     Choledocholithiasis     long after cholecystectomy     Chronic abdominal pain      Chronic constipation      Chronic nausea      Chronic pancreatitis (H)      Degeneration of lumbar or lumbosacral intervertebral disc      Esophageal reflux     w/ hiatal hernia     Gastroparesis      Hiatal hernia      History of pituitary adenoma     s/p resection     Hypothyroidism      Migraines      Mild hyperlipidemia      On tube feeding diet     presence of GJ tube     Pancreatic disease     PD stricture, 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 endometriosis growth     APPENDECTOMY       C  DELIVERY ONLY       C  DELIVERY ONLY      repeat c section with incidental cystotomy with repair     C EXCIS PITUITARY,TRANSNASAL/SEPTAL      pituitary tumor removed for diabetes insipidus     C TOTAL ABDOM HYSTERECTOMY      w/  bilateral salpingoophorectomy      C WRIST ARTHROSCOP,RELEASE XVERS LIG Bilateral 08      SECTION       COLONOSCOPY       ENDOSCOPIC RETROGRADE CHOLANGIOPANCREATOGRAM N/A 2015    Procedure: ENDOSCOPIC RETROGRADE CHOLANGIOPANCREATOGRAM;  Surgeon: Mandeep Park MD;  [facility]: [facility]     ENDOSCOPIC RETROGRADE CHOLANGIOPANCREATOGRAM N/A 2016    Procedure: COMBINED ENDOSCOPIC RETROGRADE CHOLANGIOPANCREATOGRAPHY, PLACE TUBE/STENT;  Surgeon: Mandeep Park MD;  [facility]     ENDOSCOPIC RETROGRADE CHOLANGIOPANCREATOGRAM N/A 3/17/2016    Procedure: COMBINED ENDOSCOPIC RETROGRADE CHOLANGIOPANCREATOGRAPHY, REMOVE FOREIGN BODY OR STENT/TUBE;  Surgeon: Mandeep Park MD;  [facility]     ENDOSCOPIC RETROGRADE CHOLANGIOPANCREATOGRAM N/A 2016    Procedure: COMBINED ENDOSCOPIC RETROGRADE CHOLANGIOPANCREATOGRAPHY, PLACE TUBE/STENT;  Surgeon: Mandeep Park MD;  [facility]     ENDOSCOPIC RETROGRADE CHOLANGIOPANCREATOGRAM N/A 2016    Procedure: COMBINED ENDOSCOPIC RETROGRADE CHOLANGIOPANCREATOGRAPHY, REMOVE FOREIGN BODY OR STENT/TUBE;  Surgeon: Mandeep Park MD;  [facility]     ENDOSCOPIC ULTRASOUND UPPER GASTROINTESTINAL TRACT (GI) N/A 10/3/2016    Procedure: ENDOSCOPIC ULTRASOUND, ESOPHAGOSCOPY / UPPER GASTROINTESTINAL TRACT (GI);  Surgeon: Guru Jose Rodas MD;  [facility]     ESOPHAGOSCOPY, GASTROSCOPY, DUODENOSCOPY (EGD), COMBINED N/A 2015    Procedure: COMBINED ESOPHAGOSCOPY, GASTROSCOPY, DUODENOSCOPY (EGD), REMOVE FOREIGN BODY;  Surgeon: Mandeep Park MD;  [facility]     ESOPHAGOSCOPY, GASTROSCOPY, DUODENOSCOPY (EGD), COMBINED N/A 10/25/2015    Procedure: COMBINED ESOPHAGOSCOPY, GASTROSCOPY, DUODENOSCOPY (EGD);  Surgeon: Sammy Amaro MD;  [facility]     ESOPHAGOSCOPY, GASTROSCOPY, DUODENOSCOPY (EGD), COMBINED N/A 10/25/2015    Procedure: COMBINED ESOPHAGOSCOPY, GASTROSCOPY,  DUODENOSCOPY (EGD), BIOPSY SINGLE OR MULTIPLE;  Surgeon: Sammy Amaro MD;  [facility]     ESOPHAGOSCOPY, GASTROSCOPY, DUODENOSCOPY (EGD), DILATATION, COMBINED       EXCISE LESION TRUNK N/A 4/17/2017    Procedure: EXCISE LESION TRUNK;  Removal of Abdominal Foreign Body;  Surgeon: Nestor Phoenix MD;  [facility]     HC ESOPH/GAS REFLUX TEST W NASAL IMPED >1 HR N/A 11/19/2015    Procedure: ESOPHAGEAL IMPEDENCE FUNCTION TEST WITH 24 HOUR PH GREATER THAN 1 HOUR;  Surgeon: Thiago Apple MD;  Location: [facility]     HC UGI ENDOSCOPY DIAG W BIOPSY  9/17/08     HC UGI ENDOSCOPY DIAG W BIOPSY  9/27/12     HC UGI ENDOSCOPY W ESOPHAGEAL DILATION BALLOON <30MM  9/17/08     HC UGI ENDOSCOPY W EUS N/A 5/5/2015    Procedure: COMBINED ENDOSCOPIC ULTRASOUND, ESOPHAGOSCOPY, GASTROSCOPY, DUODENOSCOPY (EGD);  Surgeon: Wm Dueñas MD;  [facility]     INJECT TRANSVERSUS ABDOMINIS PLANE (TAP) BLOCK BILATERAL Left 9/22/2016    Procedure: INJECT TRANSVERSUS ABDOMINIS PLANE (TAP) BLOCK BILATERAL;  Surgeon: Dickson Corrigan MD;  [facility]     laparoscopic pnieda  1995     LAPAROSCOPIC PANCREATECTOMY, TRANSPLANT AUTO ISLET CELL N/A 12/28/2016    Procedure: LAPAROSCOPIC PANCREATECTOMY, TRANSPLANT AUTO ISLET CELL;  Surgeon: Nestor Phoenix MD;  [facility]: [facility]     transphenoidal pituitary resection  1990     Medications:  Current Outpatient Prescriptions   Medication Sig Dispense Refill     ursodiol (ACTIGALL) 300 MG capsule Take 1 capsule (300 mg) by mouth 2 times daily 60 capsule 0     metroNIDAZOLE (FLAGYL) 500 MG tablet Take 0.5 tablets (250 mg) by mouth 3 times daily 15 tablet 0     blood glucose monitoring (PAT CONTOUR NEXT) test strip Use to test blood sugar 6-8 times daily or as directed. 200 strip 3     amylase-lipase-protease (CREON 24) 90777 UNITS CPEP per EC capsule Take 3-4 capsules by mouth with meals and 1-2 with snacks. Maximum 15 capsules per day. 450 capsule 6     insulin glargine  (LANTUS) 100 UNIT/ML injection Inject 5 units subcutaneously every morning. 3 mL 3     insulin pen needle (BD KEN U/F) 32G X 4 MM Use 6-8 times per day 100 each 11     celecoxib (CELEBREX) 100 MG capsule Take 1 capsule (100 mg) by mouth 2 times daily 60 capsule 1     cetirizine (ZYRTEC) 10 MG tablet Take 10 mg by mouth daily       Lubiprostone 8 MCG CAPS capsule Take 1 capsule (8 mcg) by mouth 2 times daily.  Take 1 capsule by mouth po BID x 5 days.  If no effect, increase to 2 capsules po BID x 5 days.  If no effect, increase to 3 capsules po BID and continue until further instruction. 90 capsule 3     insulin aspart (NOVOLOG PENFILL) 100 UNIT/ML injection Administer subcutaneously 0.5 unit per 45 grams of carbohydrates. 3 mL 3     oxyCODONE (ROXICODONE) 5 MG IR tablet Take 1-2 tablets (5-10 mg) by mouth every 4 hours as needed for moderate to severe pain 150 tablet 0     insulin aspart (NOVOLOG PEN) 100 UNIT/ML injection aspart medium correction 1 unit per 50 > 130 every 4 hours    For Pre-Meal  - 179 give 1 unit.   For Pre-Meal  - 230 give 2 units.   For Pre-Meal  - 281 give 3 units.   For Pre-Meal  - 332 give 4 units.  3     amitriptyline (ELAVIL) 10 MG tablet Take 2 tablets (20 mg) by mouth At Bedtime 60 tablet 3     senna-docusate (SENOKOT-S;PERICOLACE) 8.6-50 MG per tablet Take 1 tablet by mouth 2 times daily as needed for constipation 100 tablet      prochlorperazine (COMPAZINE) 5 MG tablet Take two 5 mg tablets by mouth every six hours as needed for nausea. 90 tablet 3     polyethylene glycol (MIRALAX/GLYCOLAX) Packet Take 17 g by mouth daily as needed for constipation 7 packet 11     diphenhydrAMINE (BENADRYL ALLERGY) 25 MG capsule Take 1 capsule (25 mg) by mouth every 6 hours as needed for itching or allergies 56 capsule 0     levothyroxine (SYNTHROID/LEVOTHROID) 125 MCG tablet Take 1 tablet (125 mcg), by mouth daily before breakfast. 1 tablet 0     docusate sodium (COLACE) 100 MG  capsule Take 1 capsule (100 mg) by mouth 2 times daily as needed for constipation, 60 capsule 0     multivitamin CF formula (CHOICEFUL) capsule Take 1 tablet by mouth daily  11     Blood Glucose Monitoring Suppl (CONTOUR NEXT EZ MONITOR) W/DEVICE KIT 1 Device 6 times daily       LANsoprazole (PREVACID) 30 MG CR capsule Take 1 capsule (30 mg) by mouth daily Take 30-60 minutes before a meal. 30 capsule 11     aspirin 81 MG EC tablet Take 1 tablet (81 mg) by mouth daily 90 tablet 3     Injection Device for insulin (NOVOPEN ECHO) MARCO 1 each daily as needed 1 each 0     glucose 40 % GEL gel Take 15-30 g by mouth every 15 minutes as needed for low blood sugar 3 Tube 2     Sharps Container (Sakti3 SHARPS ) MISC 1 Container as needed 1 each 1     blood glucose monitoring (The [employer] MICROLET) lancets Use to test blood sugar 6-8 times daily or as directed. 1 Box prn     glucagon (GLUCAGON EMERGENCY) 1 MG kit Inject 1 mg into the muscle once for 1 dose 1 mg 1     Allergies:  Allergies   Allergen Reactions     Apple Anaphylaxis     Depakote [Divalproex Sodium] Other (See Comments)     Chest pain     Zithromax [Azithromycin Dihydrate] Anaphylaxis     Noted in 4/7/08 ER     Darvocet [Propoxyphene N-Apap] Itching     Morphine Nausea and Vomiting and Rash     Nalbuphine Hcl Rash     RASH :nubaine     Zosyn [Piperacillin-Tazobactam In D5w] Rash     Possible allergy, did have a diffuse rash that seemed drug related but could have also been related to soap in the hospital.      Bactrim [Sulfamethoxazole W-Trimethoprim] Other (See Comments) and Nausea and Vomiting     Severely low liver function.     Cats      Compazine [Prochlorperazine] Other (See Comments)     Twitching. Takes Benedryl and is fine     Corticosteroids Other (See Comments)     All oral,IV and injectable steroids are contraindicated (unless in life threatening situations) in [facility] transplant recipients. They can cause irreversible loss of islet cell function.  Please contact patients transplant care coordinator Mecca ANGEL @ [phone]/Pager [phone], and Endocrinologist prior to administration.     Dust Mites      Grass      Prednisone Other (See Comments)     Insomnia       Ragweeds      Tape [Adhesive Tape] Blisters     Trees      Zofran [Ondansetron] Other (See Comments)     migraine     Social History:  Social History     Social History     Marital status:      Spouse name: Norris     Number of children: 3     Years of education: 16     Occupational History     director [employer]     Social History Main Topics     Smoking status: Former Smoker     Packs/day: 0.50     Years: 6.00     Types: Cigarettes     Start date: 9/1/1985     Quit date: 1/1/1992     Smokeless tobacco: Never Used      Comment: no 2nd hand     Alcohol use No      Comment: last drink 6/2016  - moderate social     Drug use: No     Sexual activity: Yes     Partners: Male     Birth control/ protection: None      Comment: Hystectomy 1999     Other Topics Concern     Parent/Sibling W/ Cabg, Mi Or Angioplasty Before 65f 55m? No     Blood Transfusions No     Seat Belt Yes     Social History Narrative     with 3 children and a dog.  No smoking, etoh or drug use.  Worked as a  for [employer] in [email or web address] in the past.  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     Social History     Social History Narrative     with 3 children and a dog.  No smoking, etoh or drug use.  Worked as a  for [employer] in [email or web address] in the past.  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     Family history:  Family History   Problem Relation Age of Onset     Eye Disorder Father      cataract, detached retina     Myocardial Infarction Father 60     Lipids Father      CEREBROVASCULAR DISEASE Father      Depression Father      Substance Abuse Father      Anesthesia Reaction Father      stroke right after  "surgery     Lipids Mother      Hypertension Mother      Thyroid Disease Mother      Eye Disorder Son      ptosis     Eye Disorder Paternal Grandmother      cataract     Eye Disorder Paternal Grandfather      cataract     DIABETES Paternal Grandfather      Eye Disorder Maternal Grandmother      cataract     Thyroid Disease Maternal Grandmother      Coronary Artery Disease Sister      Depression Sister      Depression Son      Anxiety Disorder Son      Thyroid Disease Sister      DIABETES Maternal Grandfather      Depression Nephew      Anxiety Disorder Nephew      Thyroid Disease Nephew      Type 2 Diabetes Cousin      paternal cousin   Physical Exam:  Vitals:    08/08/17 1458   BP: 113/75   Pulse: 90   SpO2: 99%   Weight: 66.7 kg (147 lb)   Height: 1.727 m (5' 8\")     General: Awake in no apparent distress. This patient is accompanied in the office by patient's .  Eyes: Sclerae are anicteric. PERRLA, EOMI   Neck: supple, no masses.   Lungs: unlabored.   Heart: regular rate and rhythm   Abdomen: soft non tender.  Extremities: Pulses are well palpable, no peripheral edema.   Musculoskeletal: All muscle groups are normal in bulk and tone. The patient changes position without pain behavior. The patient walks with a normal gait. Posture is normal.   Moderate tenderness noted over left suprascapular region and anterior aspect of left shoulder joint.  Strength 5/5 for bilateral dorsiflexion, plantarflexion, great toe extension, knee extension, hip flexion. Sensation to light touch intact throughout all dermatomes bilateral upper extremities and lower extremities  Psychiatric; Normal affect.   Skin: Warm and Dry.     LABORATORY VALUES:   Recent Labs   Lab Test  08/04/17   1054  06/20/17   1141   06/20/17   0929   NA  139   --    --   139   POTASSIUM  4.9   --    --   4.1   CHLORIDE  102   --    --   105   CO2  31   --    --   27   ANIONGAP  6   --    --   7   GLC  95  103*   < >  80   BUN  17   --    --   11   CR  0.77   -- "    --   0.59   KASSI  9.8   --    --   9.2    < > = values in this interval not displayed.     CBC RESULTS:   Recent Labs   Lab Test  08/04/17   1054   WBC  4.5   RBC  4.78   HGB  13.9   HCT  44.1   MCV  92   MCH  29.1   MCHC  31.5   RDW  15.9*   PLT  435       Most Recent 3 INR's:  Recent Labs   Lab Test  08/04/17   1054  05/20/17   1144  04/04/17   0757   INR  1.13  1.05  1.07     Diagnostic tests:      ASSESSMENT/PLAN:                           ASSESSMENT:  - Tear of left rotator cuff, unspecified tear extent  - Chronic abdominal pain    PLAN:  1. Medications.     a. Continue lidocaine 4% Aspercreme   b. Celebrex 200 mg qd.   c. tyelnol 1 gm tid prn       2. Interventional procedures:  Discussed about US guided suprascapular nerve block with plain lidocaine.     3. Labs and imaging: None needed for pain management.      4. Rehab: Will refer to PT for TENS unit. The patient is encouraged to stay active and to perform exercise as tolerated.      5. Psychology: f/u with dr sinha    6. Integrated medicine: We will refer the patient for acupuncture therapy.     7. Disposition:  The patient is advised to call clinic for follow up appointment in 3 months or earlier clinically indicated. We will see the patient for above mentioned procedure.     Assessment will be ongoing with changes in treatment as indicated.  Benefits/risks/alternatives to treatment have been reviewed and the patient has been instructed to contact this office if they have any questions or concerns.  This plan of care has been discussed with the patient and the patient is in agreement.   TOTAL TIME: I spent  60minutes including greater than 50% face-to-face time counseling her about her diagnosis and treatment options.     Again, thank you for allowing me to participate in the care of your patient.    Sincerely,  Martinez Arteaga MD

## 2017-08-08 NOTE — PATIENT INSTRUCTIONS
1. Your doctor has ordered a physical therapy referral today for Tens unit If the physical therapy department does not contact you 2 business days call [phone] to schedule your appointment.     2. Schedule a left suprascapular nerve block if shoulder pain returns    Follow up:  6 months    To speak with a nurse, schedule/reschedule/cancel a clinic appointment, or request a medication refill call: [phone]     You can also reach us by [employer]: [email or web address]    For refills, please call on Monday, 1 week before your medication runs out. The doctors are not always in clinic, so this gives us time to get your prescriptions ready.  Please let us know the name of the medication you are requesting a refill of.

## 2017-08-08 NOTE — PROGRESS NOTES
[facility] : Consultation Note    Patient: Dinora Mcghee Age: 52 year old   MRN: [id number] Referred by:  Patient     Date of Visit: August 8, 2017    Reason for consultation:    Dinora Mcghee is a 52 year old female who is seen in consultation today at the request of her provider,Dr. Verde for a comprehensive evaluation and management of pain.  Primary Care Provider is Justyna Lawson.  Opiate pain medications are being prescribed by - none.    Chief complaints:  Left shoulder pain and abdominal pain    History of Present illness:   The patient has a medical history significant for  chronic pancreatitis s/p pancreatectomy on 4/17/17. She still reports chronic abdominal pain of unknown etiology. In addition, she reports shoulder pain secondary to rotator cuff tear. She has seen my colleague Dr. Soto  for multifocal pain syndromes. She is currently taking Celebrex 200 mg qd. Aspercreme. Her pain is stable. She came today to establish care with me. The patient denies any side effects associated with medications, including hypersomnolence, cognitive changes, pruritis, or constipation. The patients pain is not associated with sensations of numbness and tingling or with weakness with prolonged weight bearing and walking.     [place] Prescription Monitoring Program:   Reviewed. No concerns    Review of Systems:  Review of Systems   Constitutional: Negative for chills, fever and weight loss.   HENT: Positive for ear discharge, ear pain and sore throat. Negative for hearing loss, nosebleeds and tinnitus.    Eyes: Positive for pain and redness. Negative for double vision.   Gastrointestinal: Positive for abdominal pain, constipation, diarrhea and nausea. Negative for blood in stool, heartburn, melena and vomiting.   Musculoskeletal: Positive for back pain, myalgias and neck pain.   Neurological: Positive for tingling and  headaches. Negative for dizziness, tremors, speech change, seizures, loss of consciousness and weakness.   Endo/Heme/Allergies: Negative for polydipsia.   Psychiatric/Behavioral: Positive for memory loss. Negative for depression and hallucinations. The patient is nervous/anxious and has insomnia.        Past Medical History:  Past Medical History:   Diagnosis Date     Allergic rhinitis, cause unspecified      Allergy to other foods     strawberries, apples, celeries, alice, watermelon     Arthritis     left knee     Choledocholithiasis     long after cholecystectomy     Chronic abdominal pain      Chronic constipation      Chronic nausea      Chronic pancreatitis (H)      Degeneration of lumbar or lumbosacral intervertebral disc      Esophageal reflux     w/ hiatal hernia     Gastroparesis      Hiatal hernia      History of pituitary adenoma     s/p resection     Hypothyroidism      Migraines      Mild hyperlipidemia      On tube feeding diet     presence of GJ tube     Pancreatic disease     PD stricture, 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 endometriosis growth     APPENDECTOMY       C  DELIVERY ONLY       C  DELIVERY ONLY      repeat c section with incidental cystotomy with repair     C EXCIS PITUITARY,TRANSNASAL/SEPTAL      pituitary tumor removed for diabetes insipidus     C TOTAL ABDOM HYSTERECTOMY      w/ bilateral salpingoophorectomy      C WRIST ARTHROSCOP,RELEASE XVERS LIG Bilateral 08      SECTION       COLONOSCOPY       ENDOSCOPIC RETROGRADE CHOLANGIOPANCREATOGRAM N/A 2015    Procedure: ENDOSCOPIC RETROGRADE CHOLANGIOPANCREATOGRAM;  Surgeon: Mandeep Park MD;   Location: [facility]     ENDOSCOPIC RETROGRADE CHOLANGIOPANCREATOGRAM N/A 2/9/2016    Procedure: COMBINED ENDOSCOPIC RETROGRADE CHOLANGIOPANCREATOGRAPHY, PLACE TUBE/STENT;  Surgeon: Mandeep Park MD;  Location: [facility]     ENDOSCOPIC RETROGRADE CHOLANGIOPANCREATOGRAM N/A 3/17/2016    Procedure: COMBINED ENDOSCOPIC RETROGRADE CHOLANGIOPANCREATOGRAPHY, REMOVE FOREIGN BODY OR STENT/TUBE;  Surgeon: Mandeep Park MD;  Location: [facility]     ENDOSCOPIC RETROGRADE CHOLANGIOPANCREATOGRAM N/A 8/2/2016    Procedure: COMBINED ENDOSCOPIC RETROGRADE CHOLANGIOPANCREATOGRAPHY, PLACE TUBE/STENT;  Surgeon: Mandeep Park MD;  [facility]: [facility]     ENDOSCOPIC RETROGRADE CHOLANGIOPANCREATOGRAM N/A 8/26/2016    Procedure: COMBINED ENDOSCOPIC RETROGRADE CHOLANGIOPANCREATOGRAPHY, REMOVE FOREIGN BODY OR STENT/TUBE;  Surgeon: Mandeep Park MD;  Location: [facility]     ENDOSCOPIC ULTRASOUND UPPER GASTROINTESTINAL TRACT (GI) N/A 10/3/2016    Procedure: ENDOSCOPIC ULTRASOUND, ESOPHAGOSCOPY / UPPER GASTROINTESTINAL TRACT (GI);  Surgeon: Guru Jose Rodas MD;  Location: [facility]     ESOPHAGOSCOPY, GASTROSCOPY, DUODENOSCOPY (EGD), COMBINED N/A 6/24/2015    Procedure: COMBINED ESOPHAGOSCOPY, GASTROSCOPY, DUODENOSCOPY (EGD), REMOVE FOREIGN BODY;  Surgeon: Mandeep Park MD;  [facility]: [facility]     ESOPHAGOSCOPY, GASTROSCOPY, DUODENOSCOPY (EGD), COMBINED N/A 10/25/2015    Procedure: COMBINED ESOPHAGOSCOPY, GASTROSCOPY, DUODENOSCOPY (EGD);  Surgeon: Sammy Amaro MD;  [facility]     ESOPHAGOSCOPY, GASTROSCOPY, DUODENOSCOPY (EGD), COMBINED N/A 10/25/2015    Procedure: COMBINED ESOPHAGOSCOPY, GASTROSCOPY, DUODENOSCOPY (EGD), BIOPSY SINGLE OR MULTIPLE;  Surgeon: Sammy Amaro MD;  Location: [facility]     ESOPHAGOSCOPY, GASTROSCOPY, DUODENOSCOPY (EGD), DILATATION, COMBINED       EXCISE LESION TRUNK N/A 4/17/2017    Procedure: EXCISE LESION TRUNK;  Removal of Abdominal Foreign Body;  Surgeon: Alban  Nestor Raines MD;  [facility]     HC ESOPH/GAS REFLUX TEST W NASAL IMPED >1 HR N/A 11/19/2015    Procedure: ESOPHAGEAL IMPEDENCE FUNCTION TEST WITH 24 HOUR PH GREATER THAN 1 HOUR;  Surgeon: Thiago Apple MD;  Location: [facility]     HC UGI ENDOSCOPY DIAG W BIOPSY  9/17/08     HC UGI ENDOSCOPY DIAG W BIOPSY  9/27/12     HC UGI ENDOSCOPY W ESOPHAGEAL DILATION BALLOON <30MM  9/17/08     HC UGI ENDOSCOPY W EUS N/A 5/5/2015    Procedure: COMBINED ENDOSCOPIC ULTRASOUND, ESOPHAGOSCOPY, GASTROSCOPY, DUODENOSCOPY (EGD);  Surgeon: Wm Dueñas MD;  Location: [facility]     INJECT TRANSVERSUS ABDOMINIS PLANE (TAP) BLOCK BILATERAL Left 9/22/2016    Procedure: INJECT TRANSVERSUS ABDOMINIS PLANE (TAP) BLOCK BILATERAL;  Surgeon: Dickson Corrigan MD;  [facility]     laparoscopic pineda  1995     LAPAROSCOPIC PANCREATECTOMY, TRANSPLANT AUTO ISLET CELL N/A 12/28/2016    Procedure: LAPAROSCOPIC PANCREATECTOMY, TRANSPLANT AUTO ISLET CELL;  Surgeon: Nestor Phoenix MD;  Location: [facility]     transphenoidal pituitary resection  1990       Medications:  Current Outpatient Prescriptions   Medication Sig Dispense Refill     ursodiol (ACTIGALL) 300 MG capsule Take 1 capsule (300 mg) by mouth 2 times daily 60 capsule 0     metroNIDAZOLE (FLAGYL) 500 MG tablet Take 0.5 tablets (250 mg) by mouth 3 times daily 15 tablet 0     blood glucose monitoring (PAT CONTOUR NEXT) test strip Use to test blood sugar 6-8 times daily or as directed. 200 strip 3     amylase-lipase-protease (CREON 24) 85636 UNITS CPEP per EC capsule Take 3-4 capsules by mouth with meals and 1-2 with snacks. Maximum 15 capsules per day. 450 capsule 6     insulin glargine (LANTUS) 100 UNIT/ML injection Inject 5 units subcutaneously every morning. 3 mL 3     insulin pen needle (BD KEN U/F) 32G X 4 MM Use 6-8 times per day 100 each 11     celecoxib (CELEBREX) 100 MG capsule Take 1 capsule (100 mg) by mouth 2 times daily 60 capsule 1     cetirizine (ZYRTEC) 10 MG tablet  Take 10 mg by mouth daily       Lubiprostone 8 MCG CAPS capsule Take 1 capsule (8 mcg) by mouth 2 times daily.  Take 1 capsule by mouth po BID x 5 days.  If no effect, increase to 2 capsules po BID x 5 days.  If no effect, increase to 3 capsules po BID and continue until further instruction. 90 capsule 3     insulin aspart (NOVOLOG PENFILL) 100 UNIT/ML injection Administer subcutaneously 0.5 unit per 45 grams of carbohydrates. 3 mL 3     oxyCODONE (ROXICODONE) 5 MG IR tablet Take 1-2 tablets (5-10 mg) by mouth every 4 hours as needed for moderate to severe pain 150 tablet 0     insulin aspart (NOVOLOG PEN) 100 UNIT/ML injection aspart medium correction 1 unit per 50 > 130 every 4 hours    For Pre-Meal  - 179 give 1 unit.   For Pre-Meal  - 230 give 2 units.   For Pre-Meal  - 281 give 3 units.   For Pre-Meal  - 332 give 4 units.  3     amitriptyline (ELAVIL) 10 MG tablet Take 2 tablets (20 mg) by mouth At Bedtime 60 tablet 3     senna-docusate (SENOKOT-S;PERICOLACE) 8.6-50 MG per tablet Take 1 tablet by mouth 2 times daily as needed for constipation 100 tablet      prochlorperazine (COMPAZINE) 5 MG tablet Take two 5 mg tablets by mouth every six hours as needed for nausea. 90 tablet 3     polyethylene glycol (MIRALAX/GLYCOLAX) Packet Take 17 g by mouth daily as needed for constipation 7 packet 11     diphenhydrAMINE (BENADRYL ALLERGY) 25 MG capsule Take 1 capsule (25 mg) by mouth every 6 hours as needed for itching or allergies 56 capsule 0     levothyroxine (SYNTHROID/LEVOTHROID) 125 MCG tablet Take 1 tablet (125 mcg), by mouth daily before breakfast. 1 tablet 0     docusate sodium (COLACE) 100 MG capsule Take 1 capsule (100 mg) by mouth 2 times daily as needed for constipation, 60 capsule 0     multivitamin CF formula (CHOICEFUL) capsule Take 1 tablet by mouth daily  11     Blood Glucose Monitoring Suppl (CONTOUR NEXT EZ MONITOR) W/DEVICE KIT 1 Device 6 times daily       LANsoprazole  (PREVACID) 30 MG CR capsule Take 1 capsule (30 mg) by mouth daily Take 30-60 minutes before a meal. 30 capsule 11     aspirin 81 MG EC tablet Take 1 tablet (81 mg) by mouth daily 90 tablet 3     Injection Device for insulin (NOVOPEN ECHO) MARCO 1 each daily as needed 1 each 0     glucose 40 % GEL gel Take 15-30 g by mouth every 15 minutes as needed for low blood sugar 3 Tube 2     Sharps Container (Targeter App SHARPS ) MISC 1 Container as needed 1 each 1     blood glucose monitoring (PAT MICROLET) lancets Use to test blood sugar 6-8 times daily or as directed. 1 Box prn     glucagon (GLUCAGON EMERGENCY) 1 MG kit Inject 1 mg into the muscle once for 1 dose 1 mg 1       Allergies:       Allergies   Allergen Reactions     Apple Anaphylaxis     Depakote [Divalproex Sodium] Other (See Comments)     Chest pain     Zithromax [Azithromycin Dihydrate] Anaphylaxis     Noted in 4/7/08 ER     Darvocet [Propoxyphene N-Apap] Itching     Morphine Nausea and Vomiting and Rash     Nalbuphine Hcl Rash     RASH :nubaine     Zosyn [Piperacillin-Tazobactam In D5w] Rash     Possible allergy, did have a diffuse rash that seemed drug related but could have also been related to soap in the hospital.      Bactrim [Sulfamethoxazole W-Trimethoprim] Other (See Comments) and Nausea and Vomiting     Severely low liver function.     Cats      Compazine [Prochlorperazine] Other (See Comments)     Twitching. Takes Benedryl and is fine     Corticosteroids Other (See Comments)     All oral,IV and injectable steroids are contraindicated (unless in life threatening situations) in [facility] transplant recipients. They can cause irreversible loss of islet cell function. Please contact patients transplant care coordinator Mecca ANGEL @ [phone]/Pager [phone], and Endocrinologist prior to administration.     Dust Mites      Grass      Prednisone Other (See Comments)     Insomnia       Ragweeds      Tape [Adhesive Tape] Blisters      Trees      Zofran [Ondansetron] Other (See Comments)     migraine       Social History:    Social History     Social History     Marital status:      Spouse name: Norris     Number of children: 3     Years of education: 16     Occupational History     director [employer]     Social History Main Topics     Smoking status: Former Smoker     Packs/day: 0.50     Years: 6.00     Types: Cigarettes     Start date: 9/1/1985     Quit date: 1/1/1992     Smokeless tobacco: Never Used      Comment: no 2nd hand     Alcohol use No      Comment: last drink 6/2016  - moderate social     Drug use: No     Sexual activity: Yes     Partners: Male     Birth control/ protection: None      Comment: Hystectomy 1999     Other Topics Concern     Parent/Sibling W/ Cabg, Mi Or Angioplasty Before 65f 55m? No     Blood Transfusions No     Seat Belt Yes     Social History Narrative     with 3 children and a dog.  No smoking, etoh or drug use.  Worked as a  for [employer] in [place] in the past.  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     Social History     Social History Narrative     with 3 children and a dog.  No smoking, etoh or drug use.  Worked as a  for [employer] in [place] in the past.  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         Family history:  Family History   Problem Relation Age of Onset     Eye Disorder Father      cataract, detached retina     Myocardial Infarction Father 60     Lipids Father      CEREBROVASCULAR DISEASE Father      Depression Father      Substance Abuse Father      Anesthesia Reaction Father      stroke right after surgery     Lipids Mother      Hypertension Mother      Thyroid Disease Mother      Eye Disorder Son      ptosis     Eye Disorder Paternal Grandmother      cataract     Eye Disorder Paternal Grandfather      cataract     DIABETES Paternal Grandfather      Eye Disorder Maternal  "Grandmother      cataract     Thyroid Disease Maternal Grandmother      Coronary Artery Disease Sister      Depression Sister      Depression Son      Anxiety Disorder Son      Thyroid Disease Sister      DIABETES Maternal Grandfather      Depression Nephew      Anxiety Disorder Nephew      Thyroid Disease Nephew      Type 2 Diabetes Cousin      paternal cousin         Physical Exam:  Vitals:    08/08/17 1458   BP: 113/75   Pulse: 90   SpO2: 99%   Weight: 66.7 kg (147 lb)   Height: 1.727 m (5' 8\")       General: Awake in no apparent distress. This patient is accompanied in the office by patient's .  Eyes: Sclerae are anicteric. PERRLA, EOMI   Neck: supple, no masses.   Lungs: unlabored.   Heart: regular rate and rhythm   Abdomen: soft non tender.  Extremities: Pulses are well palpable, no peripheral edema.   Musculoskeletal: All muscle groups are normal in bulk and tone. The patient changes position without pain behavior. The patient walks with a normal gait. Posture is normal.   Moderate tenderness noted over left suprascapular region and anterior aspect of left shoulder joint.  Strength 5/5 for bilateral dorsiflexion, plantarflexion, great toe extension, knee extension, hip flexion. Sensation to light touch intact throughout all dermatomes bilateral upper extremities and lower extremities  Psychiatric; Normal affect.   Skin: Warm and Dry.       LABORATORY VALUES:   Recent Labs   Lab Test  08/04/17   1054  06/20/17   1141   06/20/17   0929   NA  139   --    --   139   POTASSIUM  4.9   --    --   4.1   CHLORIDE  102   --    --   105   CO2  31   --    --   27   ANIONGAP  6   --    --   7   GLC  95  103*   < >  80   BUN  17   --    --   11   CR  0.77   --    --   0.59   KASSI  9.8   --    --   9.2    < > = values in this interval not displayed.       CBC RESULTS:   Recent Labs   Lab Test  08/04/17   1054   WBC  4.5   RBC  4.78   HGB  13.9   HCT  44.1   MCV  92   MCH  29.1   MCHC  31.5   RDW  15.9*   PLT  435       Most " Recent 3 INR's:  Recent Labs   Lab Test  08/04/17   1054  05/20/17   1144  04/04/17   0757   INR  1.13  1.05  1.07         Diagnostic tests:              ASSESSMENT/PLAN:                             ASSESSMENT:       - Tear of left rotator cuff, unspecified tear extent  - Chronic abdominal pain         PLAN:    1. Medications.     a. Continue lidocaine 4% Aspercreme   b. Celebrex 200 mg qd.   c. tyelnol 1 gm tid prn       2. Interventional procedures:  Discussed about US guided suprascapular nerve block with plain lidocaine.     3. Labs and imaging: None needed for pain management.      4. Rehab: Will refer to PT for TENS unit. The patient is encouraged to stay active and to perform exercise as tolerated.      5. Psychology: f/u with dr sinha    6. Integrated medicine: We will refer the patient for acupuncture therapy.     7. Disposition:  The patient is advised to call clinic for follow up appointment in 3 months or earlier clinically indicated. We will see the patient for above mentioned procedure.       Assessment will be ongoing with changes in treatment as indicated.  Benefits/risks/alternatives to treatment have been reviewed and the patient has been instructed to contact this office if they have any questions or concerns.  This plan of care has been discussed with the patient and the patient is in agreement.     Martinez Arteaga MD, PHD      TOTAL TIME: I spent  60minutes including greater than 50% face-to-face time counseling her about her diagnosis and treatment options.

## 2017-08-09 DIAGNOSIS — R79.89 ELEVATED LFTS: ICD-10-CM

## 2017-08-09 RX ORDER — URSODIOL 300 MG/1
300 CAPSULE ORAL 2 TIMES DAILY
Qty: 28 CAPSULE | Refills: 0 | Status: SHIPPED | OUTPATIENT
Start: 2017-08-09 | End: 2017-08-22

## 2017-08-11 ENCOUNTER — MYC MEDICAL ADVICE (OUTPATIENT)
Dept: ANESTHESIOLOGY | Facility: CLINIC | Age: 52
End: 2017-08-11

## 2017-08-11 DIAGNOSIS — M25.519 SHOULDER PAIN: Primary | ICD-10-CM

## 2017-08-14 ENCOUNTER — OFFICE VISIT (OUTPATIENT)
Dept: GASTROENTEROLOGY | Facility: CLINIC | Age: 52
End: 2017-08-14
Attending: INTERNAL MEDICINE
Payer: COMMERCIAL

## 2017-08-14 VITALS
BODY MASS INDEX: 22.43 KG/M2 | HEART RATE: 66 BPM | TEMPERATURE: 97.9 F | HEIGHT: 68 IN | DIASTOLIC BLOOD PRESSURE: 65 MMHG | SYSTOLIC BLOOD PRESSURE: 109 MMHG | WEIGHT: 148 LBS | OXYGEN SATURATION: 99 %

## 2017-08-14 DIAGNOSIS — R79.89 ELEVATED LIVER FUNCTION TESTS: Primary | ICD-10-CM

## 2017-08-14 DIAGNOSIS — R94.5 ABNORMAL RESULTS OF LIVER FUNCTION STUDIES: ICD-10-CM

## 2017-08-14 LAB
ALBUMIN SERPL-MCNC: 3.3 G/DL (ref 3.4–5)
ALP SERPL-CCNC: 198 U/L (ref 40–150)
ALT SERPL W P-5'-P-CCNC: 26 U/L (ref 0–50)
ANION GAP SERPL CALCULATED.3IONS-SCNC: 7 MMOL/L (ref 3–14)
AST SERPL W P-5'-P-CCNC: 20 U/L (ref 0–45)
BILIRUB DIRECT SERPL-MCNC: 0.1 MG/DL (ref 0–0.2)
BILIRUB SERPL-MCNC: 0.6 MG/DL (ref 0.2–1.3)
BUN SERPL-MCNC: 13 MG/DL (ref 7–30)
CALCIUM SERPL-MCNC: 8.6 MG/DL (ref 8.5–10.1)
CHLORIDE SERPL-SCNC: 107 MMOL/L (ref 94–109)
CO2 SERPL-SCNC: 26 MMOL/L (ref 20–32)
CREAT SERPL-MCNC: 0.66 MG/DL (ref 0.52–1.04)
ERYTHROCYTE [DISTWIDTH] IN BLOOD BY AUTOMATED COUNT: 15.2 % (ref 10–15)
GFR SERPL CREATININE-BSD FRML MDRD: NORMAL ML/MIN/1.7M2
GLUCOSE SERPL-MCNC: 85 MG/DL (ref 70–99)
HCT VFR BLD AUTO: 40 % (ref 35–47)
HGB BLD-MCNC: 12.7 G/DL (ref 11.7–15.7)
INR PPP: 1.11 (ref 0.86–1.14)
MCH RBC QN AUTO: 29.2 PG (ref 26.5–33)
MCHC RBC AUTO-ENTMCNC: 31.8 G/DL (ref 31.5–36.5)
MCV RBC AUTO: 92 FL (ref 78–100)
PLATELET # BLD AUTO: 436 10E9/L (ref 150–450)
POTASSIUM SERPL-SCNC: 3.9 MMOL/L (ref 3.4–5.3)
PROT SERPL-MCNC: 7.1 G/DL (ref 6.8–8.8)
RBC # BLD AUTO: 4.35 10E12/L (ref 3.8–5.2)
SODIUM SERPL-SCNC: 140 MMOL/L (ref 133–144)
WBC # BLD AUTO: 6.2 10E9/L (ref 4–11)

## 2017-08-14 PROCEDURE — 85027 COMPLETE CBC AUTOMATED: CPT | Performed by: INTERNAL MEDICINE

## 2017-08-14 PROCEDURE — 80076 HEPATIC FUNCTION PANEL: CPT | Performed by: INTERNAL MEDICINE

## 2017-08-14 PROCEDURE — 99212 OFFICE O/P EST SF 10 MIN: CPT | Mod: ZF

## 2017-08-14 PROCEDURE — 80048 BASIC METABOLIC PNL TOTAL CA: CPT | Performed by: INTERNAL MEDICINE

## 2017-08-14 PROCEDURE — 25000128 H RX IP 250 OP 636: Performed by: INTERNAL MEDICINE

## 2017-08-14 PROCEDURE — 85610 PROTHROMBIN TIME: CPT | Performed by: INTERNAL MEDICINE

## 2017-08-14 RX ORDER — HEPARIN SODIUM (PORCINE) LOCK FLUSH IV SOLN 100 UNIT/ML 100 UNIT/ML
5 SOLUTION INTRAVENOUS EVERY 8 HOURS
Status: DISCONTINUED | OUTPATIENT
Start: 2017-08-14 | End: 2017-08-22 | Stop reason: HOSPADM

## 2017-08-14 RX ADMIN — SODIUM CHLORIDE, PRESERVATIVE FREE 5 ML: 5 INJECTION INTRAVENOUS at 07:51

## 2017-08-14 ASSESSMENT — PAIN SCALES - GENERAL: PAINLEVEL: MILD PAIN (3)

## 2017-08-14 NOTE — MR AVS SNAPSHOT
After Visit Summary   8/14/2017    Dinora Mcghee    MRN: [id number]           Patient Information     Date Of Birth          1965        Visit Information        Provider Department      8/14/2017 8:50 AM Ara Lugo MD [facility] Hepatology        Today's Diagnoses     Elevated liver function tests    -  1       Follow-ups after your visit        Your next 10 appointments already scheduled     Oct 20, 2017  9:00 AM CDT   (Arrive by 8:45 AM)   Return Visit with Jesse Richardson DO   [facility] Rheumatology ([facility])    [address] [place] [address] [phone]              Who to contact     If you have questions or need follow up information about today's clinic visit or your schedule please contact [facility] HEPATOLOGY directly at [phone].  Normal or non-critical lab and imaging results will be communicated to you by [employer], letter or phone within 4 business days after the clinic has received the results. If you do not hear from us within 7 days, please contact the clinic through [employer] or phone. If you have a critical or abnormal lab result, we will notify you by phone as soon as possible.  Submit refill requests through compareit4me or call your pharmacy and they will forward the refill request to us. Please allow 3 business days for your refill to be completed.          Additional Information About Your Visit        MyChart Information     [email or web address] gives you secure access to your electronic health record. If you see a primary care provider, you can also send messages to your care team and make appointments. If you have questions, please call your primary care clinic.  If you do not have a primary care provider, please call [phone] and they will assist you.        Care EveryWhere ID     This is your Care EveryWhere ID. This could be used by other organizations to access your [facility]  "records  [id number]        Your Vitals Were     Pulse Temperature Height Pulse Oximetry BMI (Body Mass Index)       66 97.9  F (36.6  C) (Oral) 1.727 m (5' 8\") 99% 22.5 kg/m2        Blood Pressure from Last 3 Encounters:   08/14/17 109/65   08/08/17 113/75   08/04/17 103/69    Weight from Last 3 Encounters:   08/14/17 67.1 kg (148 lb)   08/08/17 66.7 kg (147 lb)   08/04/17 67.2 kg (148 lb 3.2 oz)              Today, you had the following     No orders found for display       Primary Care Provider Office Phone # Fax #    Justyna Lawson [phone]       [facility] [place] [address]  [place] [address]        Equal Access to Services     [facility]: Hadii monty ku hadasho Sopooja, waaxda luqadaha, qaybta kaalmada adeegyada, ivan yates haybharati wallace . So [facility] [phone].    ATENCIÓN: Si habla español, tiene a muñoz disposición servicios gratuitos de asistencia lingüística. Llame al [phone].    We comply with applicable federal civil rights laws and [place] laws. We do not discriminate on the basis of race, color, national origin, age, disability sex, sexual orientation or gender identity.            Thank you!     Thank you for choosing [facility] HEPATOLOGY  for your care. Our goal is always to provide you with excellent care. Hearing back from our patients is one way we can continue to improve our services. Please take a few minutes to complete the written survey that you may receive in the mail after your visit with us. Thank you!             Your Updated Medication List - Protect others around you: Learn how to safely use, store and throw away your medicines at www.disposemymeds.org.          This list is accurate as of: 8/14/17 11:59 PM.  Always use your most recent med list.                   Brand Name Dispense Instructions for use Diagnosis    amitriptyline 10 MG tablet    ELAVIL    60 tablet    Take 2 tablets (20 mg) by mouth At Bedtime    Itching, " Post-pancreatectomy diabetes (H), History of pancreatectomy, Pancreatic insufficiency       amylase-lipase-protease 54664 UNITS Cpep per EC capsule    CREON 24    450 capsule    Take 3-4 capsules by mouth with meals and 1-2 with snacks. Maximum 15 capsules per day.    Acquired total absence of pancreas       aspirin 81 MG EC tablet     90 tablet    Take 1 tablet (81 mg) by mouth daily    High platelet count (H)       BD SHARPS  Misc     1 each    1 Container as needed    Post-pancreatectomy diabetes (H)       blood glucose monitoring lancets     1 Box    Use to test blood sugar 6-8 times daily or as directed.    Acute on chronic pancreatitis (H)       blood glucose monitoring test strip    PAT CONTOUR NEXT    200 strip    Use to test blood sugar 6-8 times daily or as directed.    Acute on chronic pancreatitis (H), Post-pancreatectomy diabetes (H)       celecoxib 100 MG capsule    celeBREX    60 capsule    Take 1 capsule (100 mg) by mouth 2 times daily        cetirizine 10 MG tablet    zyrTEC     Take 10 mg by mouth daily    Elevated liver enzymes       CONTOUR NEXT EZ MONITOR W/DEVICE Kit      1 Device 6 times daily    Itching, Post-pancreatectomy diabetes (H), History of pancreatectomy, Pancreatic insufficiency       diphenhydrAMINE 25 MG capsule    BENADRYL ALLERGY    56 capsule    Take 1 capsule (25 mg) by mouth every 6 hours as needed for itching or allergies    Itching, Post-pancreatectomy diabetes (H), History of pancreatectomy, Pancreatic insufficiency       docusate sodium 100 MG capsule    COLACE    60 capsule    Take 1 capsule (100 mg) by mouth 2 times daily as needed for constipation,    Itching, Post-pancreatectomy diabetes (H), History of pancreatectomy, Pancreatic insufficiency       glucagon 1 MG kit    GLUCAGON EMERGENCY    1 mg    Inject 1 mg into the muscle once for 1 dose    Post-pancreatectomy diabetes (H)       glucose 40 % Gel gel     3 Tube    Take 15-30 g by mouth every 15 minutes  as needed for low blood sugar    Acquired total absence of pancreas       Injection Device for insulin Tika    NOVOPEN ECHO    1 each    1 each daily as needed    Post-pancreatectomy diabetes (H)       * insulin aspart 100 UNIT/ML injection    NovoLOG PEN     aspart medium correction 1 unit per 50 > 130 every 4 hours ? For Pre-Meal  - 179 give 1 unit.  For Pre-Meal  - 230 give 2 units.  For Pre-Meal  - 281 give 3 units.  For Pre-Meal  - 332 give 4 units.    Acquired total absence of pancreas       * insulin aspart 100 UNIT/ML injection    NovoLOG PENFILL    3 mL    Administer subcutaneously 0.5 unit per 45 grams of carbohydrates.    Post-pancreatectomy diabetes (H)       insulin glargine 100 UNIT/ML injection    LANTUS    3 mL    Inject 5 units subcutaneously every morning.    Post-pancreatectomy diabetes (H)       insulin pen needle 32G X 4 MM    BD KEN U/F    100 each    Use 6-8 times per day    Acquired total absence of pancreas       LANsoprazole 30 MG CR capsule    PREVACID    30 capsule    Take 1 capsule (30 mg) by mouth daily Take 30-60 minutes before a meal.    Post-pancreatectomy diabetes (H), Pancreatic insufficiency       levothyroxine 125 MCG tablet    SYNTHROID/LEVOTHROID    1 tablet    Take 1 tablet (125 mcg), by mouth daily before breakfast.    Itching, Post-pancreatectomy diabetes (H), History of pancreatectomy, Pancreatic insufficiency       Lubiprostone 8 MCG Caps capsule     90 capsule    Take 1 capsule (8 mcg) by mouth 2 times daily. Take 1 capsule by mouth po BID x 5 days. If no effect, increase to 2 capsules po BID x 5 days. If no effect, increase to 3 capsules po BID and continue until further instruction.    Chronic constipation       metroNIDAZOLE 500 MG tablet    FLAGYL    15 tablet    Take 0.5 tablets (250 mg) by mouth 3 times daily    Bacterial overgrowth syndrome       multivitamin CF formula capsule    CHOICEFUL     Take 1 tablet by mouth daily    Itching,  Post-pancreatectomy diabetes (H), History of pancreatectomy, Pancreatic insufficiency       order for DME     1 Device    TENS Unit    Shoulder pain       oxyCODONE 5 MG IR tablet    ROXICODONE    150 tablet    Take 1-2 tablets (5-10 mg) by mouth every 4 hours as needed for moderate to severe pain    Acute post-operative pain       polyethylene glycol Packet    MIRALAX/GLYCOLAX    7 packet    Take 17 g by mouth daily as needed for constipation    Itching, Post-pancreatectomy diabetes (H), History of pancreatectomy, Pancreatic insufficiency       prochlorperazine 5 MG tablet    COMPAZINE    90 tablet    Take two 5 mg tablets by mouth every six hours as needed for nausea.    Itching, Post-pancreatectomy diabetes (H), History of pancreatectomy, Pancreatic insufficiency       senna-docusate 8.6-50 MG per tablet    SENOKOT-S;PERICOLACE    100 tablet    Take 1 tablet by mouth 2 times daily as needed for constipation    Itching, Post-pancreatectomy diabetes (H), History of pancreatectomy, Pancreatic insufficiency       ursodiol 300 MG capsule    ACTIGALL    28 capsule    Take 1 capsule (300 mg) by mouth 2 times daily    Elevated LFTs       * Notice:  This list has 2 medication(s) that are the same as other medications prescribed for you. Read the directions carefully, and ask your doctor or other care provider to review them with you.

## 2017-08-14 NOTE — NURSING NOTE
Chief Complaint   Patient presents with     RECHECK     Elevated liver Enzymes   Pt roomed, vitals, meds, and allergies reviewed with pt. Pt ready for provider.  Douglas Devries, CMA

## 2017-08-14 NOTE — LETTER
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 She is here with her  today.    Recent liver tests (today and 10 days ago) are near normal. Her liver synthetic function has been normal afterTPAIT. ALT is currently 26, AST 20 Alk phos 198, albumin 3.3. Hgb 12.7, Plt 436    Rheumatology (Dr. Richardson and Dr. 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 Considerations for treatments are rituximab vs MMF.    Recent lip biopsy: normal    Multiple physicians have weighed in on her case. Recent exchange of several messages within her care team: Group consensus is currently NO TREATMENT indicated.    Per Dr. Park: \"Dr Guru Garcia has reviewed this carefully. 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 The pancreas showed no signs of AIP. Doubt the hepatic lesion has any connection to her post TPIAT symptoms, which are pretty common.     We feel pretty firmly that no treatment for this is indicated right now. Rituximab, Cellcept and especially any steroids are likely to impair islet function and probably not lead to any symptomatic improvement. Dr BARGER has experience treating IgG4 pancreatitis with Rituximab, is in contact w Dr Vo Chair at [place] who is perhaps the world's leading authority on autoimmune pancreatitis.\"    Dinora has had other issues, chronic abdominal pain and just saw pain clinic, occasional episodes of sweating and nausea followed by and episode of diarrhea and then complete resolution of the feeling of nausea and sweating. Occasional itching. Overall she feels like she is doing better. She is supportive of the group's decision to not pursue any " "treatment. She has had a lot of doctor appointments and she is tired of all of the visits.    Vitals: /65  Pulse 66  Temp 97.9  F (36.6  C) (Oral)  Ht 1.727 m (5' 8\")  Wt 67.1 kg (148 lb)  SpO2 99%  BMI 22.5 kg/m2  BMI= Body mass index is 22.5 kg/(m^2).   GEN: Very well appearing, NAD,   PSYCH: Pleasant, appropriate    A/P   Elevated liver tests, IgG4 hepatic dome lesion. Liver tests have returned to normal. She continues to feel better and better all the time. No treatment for IgG4 disease is indicated at this time. If treatment were pursued, we agreed on MMF as the likley choice.  I have talked today with Dr. Richardson and Dr. Middleton. I summarized our discussions for the patient, and she was already informed and felt this was consistent with what she has heard from her other doctors.  We are all in agreement to just watch her lab-wise and symptomatically.  I will defer lab monitoring to rheumatology and transplant. Dinora and I did not make a follow up appointment, but she will return when needed.     This was a 30 minute visit, over 50% counseling and coordination of care.     Ara Lugo MD      "

## 2017-08-14 NOTE — PROGRESS NOTES
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 She is here with her  today.    Recent liver tests (today and 10 days ago) are near normal. Her liver synthetic function has been normal afterTPAIT. ALT is currently 26, AST 20 Alk phos 198, albumin 3.3. Hgb 12.7, Plt 436    Rheumatology (Dr. Richardson and Dr. 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 Considerations for treatments are rituximab vs MMF.    Recent lip biopsy: normal    Multiple physicians have weighed in on her case. Recent exchange of several messages within her care team: Group consensus is currently NO TREATMENT indicated.    Per Dr. Park: \"Dr Guru Garcia has reviewed this carefully. 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 The pancreas showed no signs of AIP. Doubt the hepatic lesion has any connection to her post TPIAT symptoms, which are pretty common.     We feel pretty firmly that no treatment for this is indicated right now. Rituximab, Cellcept and especially any steroids are likely to impair islet function and probably not lead to any symptomatic improvement. Dr BARGER has experience treating IgG4 pancreatitis with Rituximab, is in contact w Dr Tavo Beck at [place] who is perhaps the world's leading authority on autoimmune pancreatitis.\"    Dinora has had other issues, chronic abdominal pain and just saw pain clinic, occasional episodes of sweating and nausea followed by and episode of diarrhea and then complete resolution of the feeling of nausea and sweating. Occasional itching. Overall she feels like she is doing better. She is supportive of the group's decision to not pursue any treatment. She has had a lot of doctor appointments and she is tired of all of the " "visits.    Vitals: /65  Pulse 66  Temp 97.9  F (36.6  C) (Oral)  Ht 1.727 m (5' 8\")  Wt 67.1 kg (148 lb)  SpO2 99%  BMI 22.5 kg/m2  BMI= Body mass index is 22.5 kg/(m^2).   GEN: Very well appearing, NAD,   PSYCH: Pleasant, appropriate    A/P   Elevated liver tests, IgG4 hepatic dome lesion. Liver tests have returned to normal. She continues to feel better and better all the time. No treatment for IgG4 disease is indicated at this time. If treatment were pursued, we agreed on MMF as the likley choice.  I have talked today with Dr. Richardson and Dr. Middleton. I summarized our discussions for the patient, and she was already informed and felt this was consistent with what she has heard from her other doctors.  We are all in agreement to just watch her lab-wise and symptomatically.  I will defer lab monitoring to rheumatology and transplant. Dinora and I did not make a follow up appointment, but she will return when needed.     This was a 30 minute visit, over 50% counseling and coordination of care.     "

## 2017-08-17 ENCOUNTER — TELEPHONE (OUTPATIENT)
Dept: TRANSPLANT | Facility: CLINIC | Age: 52
End: 2017-08-17

## 2017-08-17 NOTE — TELEPHONE ENCOUNTER
Hi there. I m glad to see that you are doing better and decisions made about this IgG4 diagnosis.     I was reviewing your immunizations and you need to have a few more immunizations to complete the series.  I don t think you have had the Pneumococcal Prevnar 13. You have had the Pneumovax 23.  I sent you an immunization letter 12/1/2016. Do you still have it?    Let me know so we can review and you can review with your PCP. I also sent Dr. Lawson the same letter.    Thank you!    Mecca Watkins RN BSN  Phone [phone]. Pager: [phone]  Toll free [phone]. Fax    Transplant Coordinator for Chronic pancreatitis /Islet Auto Transplant Program  [facility] Room [address]. [address] [address]

## 2017-08-22 DIAGNOSIS — R79.89 ELEVATED LFTS: ICD-10-CM

## 2017-08-22 RX ORDER — URSODIOL 300 MG/1
300 CAPSULE ORAL 2 TIMES DAILY
Qty: 60 CAPSULE | Refills: 1 | Status: SHIPPED | OUTPATIENT
Start: 2017-08-22 | End: 2018-01-16

## 2017-08-24 NOTE — TELEPHONE ENCOUNTER
Providers have decided not to pursue Rituxan at this time.    Juan Jose Wiseman, MEDARDON RN  Rheumatology RN Coordinator   [place]

## 2017-08-30 ENCOUNTER — TELEPHONE (OUTPATIENT)
Dept: TRANSPLANT | Facility: CLINIC | Age: 52
End: 2017-08-30

## 2017-08-30 NOTE — TELEPHONE ENCOUNTER
Dinora is asking for help with her bowel regimen. Currently she is prescribed Amitiza 24 mcg BID. She is also taking 200 mg Colace BID, Senna 2 pills BID, and Miralax IF she has not stooled for 1-2 consecutive days. Lately she has been holding her morning Amitiza dose.   She described having a BM most every day but then about every 4th day she has a major blow out accompanied by major cramping, sweats, nausea. She said these blow outs are worse than a bowel clean out. She will have a huge stool output.   She wants to try and go back to work and is applying at various places but does not want this kind of bowel activity occurring in the work place.   To start with, this RN suggested she only take the Amitiza daily and that I would provide an update to Dr. Phoenix and await her recommendations.   Otherwise she feels good and can do mostly what she wants and is feeling like she is in a good place overall.   She does not want to stop the Colace or Senna. We discussed that Amitiza is also available in smaller doses.

## 2017-09-01 DIAGNOSIS — R79.89 ELEVATED LFTS: ICD-10-CM

## 2017-09-01 DIAGNOSIS — K59.09 CHRONIC CONSTIPATION: ICD-10-CM

## 2017-09-01 RX ORDER — LUBIPROSTONE 8 UG/1
CAPSULE ORAL
Qty: 180 CAPSULE | Refills: 0 | Status: SHIPPED | OUTPATIENT
Start: 2017-09-01 | End: 2017-10-20

## 2017-09-03 ENCOUNTER — TELEPHONE (OUTPATIENT)
Dept: TRANSPLANT | Facility: CLINIC | Age: 52
End: 2017-09-03

## 2017-09-03 NOTE — TELEPHONE ENCOUNTER
"  Dinora called  On Friday 9/1/17,requesting refill of .Amitiza--see previous note by Mecca Watkins. Dinora  said that the daily dose of 24mcg did not work at all. She will resume twice daily dosing but try reducing by 8 mcg per one dose for now. I refilled it for the full dose of 24 BID  with 8 mcg caps so she can make minor tweaks to optimize \"results \"   Above plan approved by Dr Phoenix  "

## 2017-10-06 ENCOUNTER — MYC MEDICAL ADVICE (OUTPATIENT)
Dept: RHEUMATOLOGY | Facility: CLINIC | Age: 52
End: 2017-10-06

## 2017-10-06 DIAGNOSIS — K76.9 LIVER LESION: Primary | ICD-10-CM

## 2017-10-10 ENCOUNTER — TELEPHONE (OUTPATIENT)
Dept: TRANSPLANT | Facility: CLINIC | Age: 52
End: 2017-10-10

## 2017-10-10 NOTE — TELEPHONE ENCOUNTER
Thanks Lidya,    Dr Melissa asked me to talk with you about my continued gut pain.    And, as you may recall I've been playing with the dosage of Amatiza. I take 2 Colace & 2 Senna daily and walk at least 3 miles a day. None of this seems to stop the bouts of watery stool vs days of constipation. No matter what I do I continue to have gut pain up under my ribs. It ranges in discomfort from 1 to 5. Heating pad helps the most.    Additionally, I've been battling nausea again. I've removed uncooked veggies, Used lavender, mint tea, sea bands, acupuncture, mint oil, visualization, TENS, benadryl, compazine and scopalamine patch with moderate success. I only vomit occasionally. Have maintained my weight at 145 and blood glucose is doing well.     I continue taking Creon 3-4 with meals or 12-15 per day. My stool often has undigested food in it and ranges in color from light pale tan to light brown.     The stool issue has been going on for months. The nausea started up about 6 weeks ago or so. Hopefully this gives you a clear picture but if I missed something, please ask.    I see Dr Richardson on Oct 20th. Should I be doing something else? Seeing Dr. Park or another gi?    Thanks,  Dinora

## 2017-10-16 ASSESSMENT — ACTIVITIES OF DAILY LIVING (ADL): I_KEEP_THINKING_ABOUT_HOW_BADLY_I_WANT_THE_PAIN_TO_STOP: 2 = TO A MODERATE DEGREE

## 2017-10-20 ENCOUNTER — OFFICE VISIT (OUTPATIENT)
Dept: RHEUMATOLOGY | Facility: CLINIC | Age: 52
End: 2017-10-20
Attending: INTERNAL MEDICINE
Payer: COMMERCIAL

## 2017-10-20 ENCOUNTER — OFFICE VISIT (OUTPATIENT)
Dept: ANESTHESIOLOGY | Facility: CLINIC | Age: 52
End: 2017-10-20

## 2017-10-20 VITALS
OXYGEN SATURATION: 96 % | WEIGHT: 150.9 LBS | HEIGHT: 68 IN | SYSTOLIC BLOOD PRESSURE: 107 MMHG | DIASTOLIC BLOOD PRESSURE: 70 MMHG | HEART RATE: 81 BPM | BODY MASS INDEX: 22.87 KG/M2

## 2017-10-20 DIAGNOSIS — Z23 NEED FOR INFLUENZA VACCINATION: ICD-10-CM

## 2017-10-20 DIAGNOSIS — K59.09 CHRONIC CONSTIPATION: ICD-10-CM

## 2017-10-20 DIAGNOSIS — R76.8 IGG4 SELECTIVELY HIGH IN PLASMA: ICD-10-CM

## 2017-10-20 DIAGNOSIS — R76.8 IGG4 SELECTIVELY HIGH IN PLASMA: Primary | ICD-10-CM

## 2017-10-20 DIAGNOSIS — F43.89 ADJUSTMENT REACTION TO CHRONIC STRESS: Primary | ICD-10-CM

## 2017-10-20 DIAGNOSIS — K76.9 LIVER LESION: ICD-10-CM

## 2017-10-20 LAB
ALBUMIN SERPL-MCNC: 3.7 G/DL (ref 3.4–5)
ALP SERPL-CCNC: 161 U/L (ref 40–150)
ALT SERPL W P-5'-P-CCNC: 22 U/L (ref 0–50)
AST SERPL W P-5'-P-CCNC: 23 U/L (ref 0–45)
BILIRUB DIRECT SERPL-MCNC: 0.1 MG/DL (ref 0–0.2)
BILIRUB SERPL-MCNC: 0.7 MG/DL (ref 0.2–1.3)
IGG SERPL-MCNC: 1070 MG/DL (ref 695–1620)
IGG1 SER-MCNC: 429 MG/DL (ref 300–856)
IGG2 SER-MCNC: 370 MG/DL (ref 158–761)
IGG3 SER-MCNC: 83 MG/DL (ref 24–192)
IGG4 SER-MCNC: 89 MG/DL (ref 11–86)
INR PPP: 1.04 (ref 0.86–1.14)
PROT SERPL-MCNC: 7.4 G/DL (ref 6.8–8.8)

## 2017-10-20 PROCEDURE — 85610 PROTHROMBIN TIME: CPT | Performed by: INTERNAL MEDICINE

## 2017-10-20 PROCEDURE — 82787 IGG 1 2 3 OR 4 EACH: CPT | Performed by: INTERNAL MEDICINE

## 2017-10-20 PROCEDURE — 25000128 H RX IP 250 OP 636: Mod: ZF | Performed by: INTERNAL MEDICINE

## 2017-10-20 PROCEDURE — 99212 OFFICE O/P EST SF 10 MIN: CPT | Mod: 25,ZF

## 2017-10-20 PROCEDURE — G0008 ADMIN INFLUENZA VIRUS VAC: HCPCS | Mod: ZF

## 2017-10-20 PROCEDURE — 80076 HEPATIC FUNCTION PANEL: CPT | Performed by: INTERNAL MEDICINE

## 2017-10-20 PROCEDURE — 90686 IIV4 VACC NO PRSV 0.5 ML IM: CPT | Mod: ZF | Performed by: INTERNAL MEDICINE

## 2017-10-20 PROCEDURE — 82784 ASSAY IGA/IGD/IGG/IGM EACH: CPT | Performed by: INTERNAL MEDICINE

## 2017-10-20 PROCEDURE — 25000128 H RX IP 250 OP 636: Performed by: INTERNAL MEDICINE

## 2017-10-20 RX ORDER — HEPARIN SODIUM (PORCINE) LOCK FLUSH IV SOLN 100 UNIT/ML 100 UNIT/ML
5 SOLUTION INTRAVENOUS
Status: COMPLETED | OUTPATIENT
Start: 2017-10-20 | End: 2017-10-20

## 2017-10-20 RX ORDER — LUBIPROSTONE 8 UG/1
CAPSULE ORAL
Qty: 180 CAPSULE | Refills: 0 | Status: SHIPPED | OUTPATIENT
Start: 2017-10-20 | End: 2017-11-13

## 2017-10-20 RX ORDER — SCOLOPAMINE TRANSDERMAL SYSTEM 1 MG/1
1 PATCH, EXTENDED RELEASE TRANSDERMAL
COMMUNITY
End: 2018-01-16

## 2017-10-20 RX ADMIN — INFLUENZA A VIRUS A/MICHIGAN/45/2015 X-275 (H1N1) ANTIGEN (FORMALDEHYDE INACTIVATED), INFLUENZA A VIRUS A/HONG KONG/4801/2014 X-263B (H3N2) ANTIGEN (FORMALDEHYDE INACTIVATED), INFLUENZA B VIRUS B/PHUKET/3073/2013 ANTIGEN (FORMALDEHYDE INACTIVATED), AND INFLUENZA B VIRUS B/BRISBANE/60/2008 ANTIGEN (FORMALDEHYDE INACTIVATED) 0.5 ML: 15; 15; 15; 15 INJECTION, SUSPENSION INTRAMUSCULAR at 10:38

## 2017-10-20 RX ADMIN — SODIUM CHLORIDE, PRESERVATIVE FREE 5 ML: 5 INJECTION INTRAVENOUS at 08:32

## 2017-10-20 ASSESSMENT — PAIN SCALES - GENERAL: PAINLEVEL: MODERATE PAIN (4)

## 2017-10-20 NOTE — LETTER
10/20/2017       RE: Dinora Mcghee  [address] [facility] [address]     Dear Colleague,    Thank you for referring your patient, Dinora Mcghee, to the [facility] CLINIC FOR COMPREHENSIVE PAIN MANAGEMENT at [facility]. Please see a copy of my visit note below.    [facility]   Comprehensive Pain Program   Psychology Progress Note    Patient Name: Dinora Mcghee    YOB: 1965   Medical Record Number: [id number]  Date: 10/20/2017              SUBJECTIVE        Interval history:   Shoulder resolved.  No benefit from Lyrica  So weaned  But coming off Lyrica seems to have triggered migraines increased to q day but usually has one every q 2 weeks.    The migraines are associated with N& V  And she knows this but it reminds her CP  And she has flash backs to pancreatitis days.     Dr. Phoenix has recommend RD. Consult re food sensitivities  Its been 12 weeks since last Lyrica dose.    And separate from the migraines she has had some upper abd pain similar to the old pancreatitis pain and ongoing queasiness.  She has been exercising and obtained an exciting new job and the resurgence of sx has really depressed her and triggered anxiety       OBJECTIVE:              Length of Visit: 60 minutes        Mental status: depressed affect         Session objective:   Continued behavioral pain management skill improvement         Behavioral inventions and response:                 Auditory EMDR/Bilateral sound during session   Along with            CBT  On  Trauma and pain patterns in CNS            Breathing Imagery   Along with                Resourced Brainspotting/Eye position therapy [employer]  is pain and fear of relapsing symptoms                                   Resource spot=   Special place and then 5 pt grid   .          Beginning  SUDS =  5              Ending SUDS  =  1  And no nausea                                                              ASSESSMENT               Diagnoses:                                             F43.8      Other stress related disorder                                                               Psychosocial and Contextual Factors: had fluctuating course         Progress toward goals: as expected.              Pain status since onset of pain services: had fluctuating course           Emotional status since onset of pain services: had fluctuating course       Medication / chemical use concerns: None        PLAN:               Next Appointment: Dinora Mcghee will schedule a follow-up appointment in 1 weeks.         Assignment: Establish a daily routine of stretching and exercise, Learn to understand and follow physical limitations  and auditory EMDR         Objectives / interventions for next session:          Improve pain management skills: Goals include:  Cognitive therapy: create constructive thought processes and beliefs regarding pain and Resource Brainspotting/Eye Position therapy       Maria Elena Abdalla, Ph.D., L.P. ...............10/20/2017 10:56 AM                Medical Psychologist       Again, thank you for allowing me to participate in the care of your patient.      Sincerely,    Maria Elena Abdalla, PhD

## 2017-10-20 NOTE — PROGRESS NOTES
Rheumatology Clinic Visit-Fellow Note     Dinora Mcghee MRN# [id number]   YOB: 1965 Age: 52 year old     Date of Visit: 10/20/2017   Last seen: 8/4/2017  Primary care provider: Justyna Lawson  Referring Provider: Dr. Ara Lugo with GI  Reason for Referral: Further evaluation and management for possible Sjogren's vs IgG4 related disease in patient with sicca symptoms and evidence of isolated? IgG4 in liver/pancreas.           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   # h/o endometriosis  # h/o pituitary mass with 2/2 DI now s/p transsphenoidal resection in 1990    Discussion:  53 y/o female with longstanding history of chronic pancreatitis s/p pancreatectomy with islet autotransplant 12/2016 who is overall improving but found to have elevated serum and > 60 HPF cells on liver biopsy staining. She has sicca symptoms for the past 5 years but no evidence of Sjogren's vs IgG4 related disease on lip biopsy. No other evidence of IgG4 related activity with no RPF, aortitis, orbital disease, thyroid, pulmonary, or kidney disease. Interestingly, IgG4 disease can impact the hypophysis and she does have a history of pituitary issues back in 1990. Recent meta analysis assessing IgG4 serum sensitivity is 87.2% with 82% specificity (1). Her biopsy containing > 50 IgG4 staining plasma cells is highly suggestive of IgG4 related disease as well and meets recent proposed pathologic recommendations of IgG4 related disease (2). She meets many of the features for IgG4 related disease including tissue biopsy.     In regards to management of her IgG4 Related disease there are no randomized control trials and all medicines are investigational. The most recent evidence highlights  that the IgG4 antibodies are pathologic and that medications that suppress B lymphocytes like CD-20 cells (Rituximab) creating the antibodies are the preferred therapy. This is especially important if the organ dysfunction can be a critical organ like the liver in a patient with recent surgeries and transplant. Currently her liver dysfunction has normalized and there is no evidence of ongoing IgG4 related disease at this time.     1. Daniel Barber et al.  Diagnostic Value of Serum IgG4 for IgG4-Related Disease: A MADELYN-Compliant Systematic Review and Badger-Analysis.  Ed. Lj Quinn. Medicine 95.21 (2016): e3785. [employer]. Web. 17 May 2017.  2. Annabel V, Ever Y, Donovan JK, et al. Consensus statement on the pathology of IgG4-RD. Mod Pathol 2012;64:3061-7.    Plan:  -- BMP, Hepatic panel, INR, and CBC w/ diff labs reviewed with the patient in the room.  -- agree with plan to continue to monitor off immune suppressives given ongoing stability in liver labs and IgG4 levels.   -- If liver dysfunction returns would need to decide with GI if we suspect related to IgG4 and if so then therapeutic options for IgG4 related disease would normally be Rituximab preferred (but has steroids (can cause irreversible damage to islet cells) with infusion) vs MMF (no steroids but not ideal agent).  -- Patient deferred initiating Evoxac or restasis at this point in time.  -- discussed various lozenges that help sicca symptoms    Follow up:  RTC in 5 months, unless new symptoms emerge. Labs in 2-3 months to monitor for possible IgG4 involvement.    Prevention:  Pneumovax PPSV23: 12/2016  PCV13 (Prevnar): not in [facility]  Tdap: 1/7/2008?  Influenza today received 10/20/17  Shingles vaccine: not recieved  Reminded if on biologic no live vaccine (flu mist, shingles): n/a  HBVsAg: negative  HBVcore: ordered  HCV: negative   Q gold (or T spot): ordered  Bactrim prophylaxis: n/a  Fungal prophylaxis: n/a  Vitamin D and calcium/bone health: Ca and  Vitamin D    Immunizations:  Immunization History   Administered Date(s) Administered     HIB 12/01/2016     Influenza (IIV3) 09/29/2011, 11/15/2014, 10/21/2016     Influenza (intradermal) 11/01/2003, 09/29/2011     Influenza Vaccine IM 3yrs+ 4 Valent IIV4 10/24/2015, 10/21/2016     Influenza Vaccine, 3 YRS +, IM (QUADRIVALENT W/PRESERVATIVES) 11/04/2013     Meningococcal (Bexsero ) 12/09/2016     Meningococcal (Menactra ) 12/01/2016     Pneumococcal 23 valent 12/01/2016     TD (ADULT, 7+) 07/09/1998     TDAP Vaccine (Adacel) 01/07/2008     Discussed with attending Dr. Becerra, who agrees with the above assessment and plan.    Jesse Richardson DO  Rheumatology Fellow  Pager: [phone]    Attending Note: I saw and evaluated the patient with Dylan. I agree with the assessment and plan.    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  Document previously scanned on 1/12/17.  Validation form completed and scanned.  Code Status reflects choices in most recent ACP document..  Confirmed/documented designated decision maker(s).  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     Priority: Medium     Acute on chronic pancreatitis (H) 09/03/2016     Priority: Medium     Acute abdominal  pain 08/02/2016     Priority: Medium     Abdominal pain, epigastric 10/23/2015     Priority: Medium     Severe protein-calorie malnutrition (H) 06/09/2015     Priority: Medium     Acute pancreatitis 06/03/2015     Priority: Medium     Abdominal pain 06/02/2015     Priority: Medium     S/P ERCP 06/02/2015     Priority: Medium     Abdominal pain, generalized 04/20/2015     Priority: Medium     History of ERCP 04/20/2015     Priority: Medium     Other type of intractable migraine      Priority: Medium     Diagnosis updated by automated process. Provider to review and confirm.       GERD (gastroesophageal reflux disease) 12/01/2010     Priority: Medium     Lumbago 04/18/2005     Priority: Medium     History of L5-S1 degenerative disk disease.         Sprain and strain of other specified sites of hip and thigh 12/09/2002     Priority: Medium     Chondromalacia of patella 12/09/2002     Priority: Medium     Need for prophylactic immunotherapy      Priority: Medium     trees, grass, srw, dust mites, cat       Allergic rhinitis due to other allergen 12/21/2001     Priority: Medium     Hypothyroidism      Priority: Medium     Problem list name updated by automated process. Provider to review       Sensorineural hearing loss 01/10/2005     Priority: Medium     Problem list name updated by automated process. Provider to review       Vertiginous syndrome and labyrinthine disorder 01/10/2005     Priority: Medium     Problem list name updated by automated process. Provider to review              History of Present Illness:   Dinora Mcghee is a 52 year old female with a past medical history of hypothyroidism, h/o pituitary mass and secondary DI s/p transphenoidal resection 1990, HLD, left knee OA, lumbar DDD, chronic pancreatitis for 30 years, now s/p islet cell transplantation who presents today for further evaluation of elevated IgG4 found both in serum and liver biopsy as well as sicca symptoms.     She notes that she has  "had longstanding issues with pancreatitis without any mention of aortitis, RPF, urinary obstruction. She denies any history of cancers, lymphoma or leukemia. No family history of autoimmune conditions other than possible RA in her dad. She had distant DI secondary from pituitary mass (s/p removal and not needing hormones) and joint wise has longstanding OA of left knee. She mentions that for the past 5 years she has had dry eye requiring eye drops. Sometimes related to her allergies she has to now regularly use eye drops. \"Feels like sand\" is rubbing in eyes. She also noticed ~ 5 years ago dry mouth where a cracker would not dissolve. She has to constantly carry water and drink it to keep her mouth dry. She uses sugar free gum and lozenges. Most of her clinical symptom burden has been related to her pancreas with frequent ERCP and concern for Sphincter of Oddi dysfunction. She had issues with significant pancreatic insuffiencey with malnutrition and weight loss. She underwent islet cell autotransplantation 12/28/2017 and was found to have elevated IgG4 cells in her liver biopsy. Subsequent IgG4 levels were elevated.    Interim history: 6/20/2017 - 8/4/2017  Comes in today frustrated about the multiple denials from the insurance about Rituximab for her IgG4 Related disease. She has had recent elevations of her LFTs and mentions that the last 3 days she has started to feel worse like before her pancreatectomy. Some nausea, no vomiting, but her appetite is lower. She denies any jaundice, but does not itching of her skin. She denies any fevers, chills, but has had 1 day of watery, nonbloody diarrhea. She had a recent MRCP with signs of biliary dilatation at ducts within the liver. She has a new hyperenhancement lesion in her liver with stability of other lesions. She is following with Dr. Lugo within the next week. She mentions that her dry eyes are getting worse.     Interim history: 8/4/2017 - 10/20/2017  After " further discussion with GI and SOT regarding therapies Dinora's LFTs normalized and the consensus was to monitor and hold on immune suppressive therapies given the side effect profile. She has continued to improve and is now off insulin. She wonders about her known slow emptying an gastroparesis has been acting up and she has had issues with N/V the past 2 weeks particularly after she lays down (worse in AM, but best midday). Otherwise things have been going well, denies any infections and her shoulder is almost back to normal.          Review of Systems:   Constitutional: denies fevers/chills, fatigue, patient is having weight gain s/p transplant.  Skin: denies rash, raynauds or alopecia  Eyes: denies hx of uvetitis, double vision, positive dry eyes  Ears/Nose/Throat:  denies recurrent URI or sinusitis, positive dry mouth, denies any oral or nasal ulcers  Respiratory: No shortness of breath, dyspnea on exertion, cough, or hemoptysis  Cardiovascular:  denies chest pain, palpitations, hx pleurisy  Gastrointestinal: denies diarrhea, blood in stool, hx of IBD, positive past pancreatitis, positive nausea, rare vomiting, positive RUQ pain.  Genitourinary: denies hematuria.  Musculoskeletal: denies red, tender, swollen joints, left shoulder frozen shoulder since surgery almost normal.  Neurologic:  denies headaches, seizures, some numbness or tingling s/p abdominal surgery.  Psychiatric:  denies history of depression or mental illness  Hematologic/Lymphatic/Immunologic:  denies history of blood clots, easy bruising, bleeding.  Endocrine:  Positive hypothyroidism.          Past Medical History:     Past Medical History:   Diagnosis Date     Allergic rhinitis, cause unspecified      Allergy to other foods     strawberries, apples, celeries, alice, watermelon     Arthritis     left knee     Choledocholithiasis     long after cholecystectomy     Chronic abdominal pain      Chronic constipation      Chronic nausea      Chronic  pancreatitis (H)      Degeneration of lumbar or lumbosacral intervertebral disc      Esophageal reflux     w/ hiatal hernia     Gastroparesis      Hiatal hernia      History of pituitary adenoma     s/p resection     Hypothyroidism      Migraines      Mild hyperlipidemia      On tube feeding diet     presence of GJ tube     Pancreatic disease     PD stricture, 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    Past Surgical History  Past Surgical History:   Procedure Laterality Date     ABDOMEN SURGERY      c sections: 93, 96, 98. endometriosis growth     APPENDECTOMY       C  DELIVERY ONLY       C  DELIVERY ONLY      repeat c section with incidental cystotomy with repair     C EXCIS PITUITARY,TRANSNASAL/SEPTAL      pituitary tumor removed for diabetes insipidus     C TOTAL ABDOM HYSTERECTOMY      w/ bilateral salpingoophorectomy      C WRIST ARTHROSCOP,RELEASE XVERS LIG Bilateral 08      SECTION       COLONOSCOPY       ENDOSCOPIC RETROGRADE CHOLANGIOPANCREATOGRAM N/A 2015    Procedure: ENDOSCOPIC RETROGRADE CHOLANGIOPANCREATOGRAM;  Surgeon: Mandeep Park MD;  Location:  OR     ENDOSCOPIC RETROGRADE CHOLANGIOPANCREATOGRAM N/A 2016    Procedure: COMBINED ENDOSCOPIC RETROGRADE CHOLANGIOPANCREATOGRAPHY, PLACE TUBE/STENT;  Surgeon: Mandeep Park MD;  Location:  OR     ENDOSCOPIC RETROGRADE CHOLANGIOPANCREATOGRAM N/A 3/17/2016    Procedure: COMBINED ENDOSCOPIC RETROGRADE CHOLANGIOPANCREATOGRAPHY, REMOVE FOREIGN BODY OR STENT/TUBE;  Surgeon: Mandeep Park MD;  Location:  OR     ENDOSCOPIC RETROGRADE CHOLANGIOPANCREATOGRAM N/A 2016    Procedure: COMBINED ENDOSCOPIC RETROGRADE CHOLANGIOPANCREATOGRAPHY, PLACE TUBE/STENT;  Surgeon: Mandeep Park MD;  Location:   OR     ENDOSCOPIC RETROGRADE CHOLANGIOPANCREATOGRAM N/A 8/26/2016    Procedure: COMBINED ENDOSCOPIC RETROGRADE CHOLANGIOPANCREATOGRAPHY, REMOVE FOREIGN BODY OR STENT/TUBE;  Surgeon: Mandeep Park MD;  Location: [facility]     ENDOSCOPIC ULTRASOUND UPPER GASTROINTESTINAL TRACT (GI) N/A 10/3/2016    Procedure: ENDOSCOPIC ULTRASOUND, ESOPHAGOSCOPY / UPPER GASTROINTESTINAL TRACT (GI);  Surgeon: Guru Jose Rodas MD;  Location: [facility] OR     ESOPHAGOSCOPY, GASTROSCOPY, DUODENOSCOPY (EGD), COMBINED N/A 6/24/2015    Procedure: COMBINED ESOPHAGOSCOPY, GASTROSCOPY, DUODENOSCOPY (EGD), REMOVE FOREIGN BODY;  Surgeon: Mandeep Park MD;  Location: [facility] GI     ESOPHAGOSCOPY, GASTROSCOPY, DUODENOSCOPY (EGD), COMBINED N/A 10/25/2015    Procedure: COMBINED ESOPHAGOSCOPY, GASTROSCOPY, DUODENOSCOPY (EGD);  Surgeon: Sammy Amaro MD;  Location: [facility]     ESOPHAGOSCOPY, GASTROSCOPY, DUODENOSCOPY (EGD), COMBINED N/A 10/25/2015    Procedure: COMBINED ESOPHAGOSCOPY, GASTROSCOPY, DUODENOSCOPY (EGD), BIOPSY SINGLE OR MULTIPLE;  Surgeon: Sammy Amaro MD;  Location: [facility] GI     ESOPHAGOSCOPY, GASTROSCOPY, DUODENOSCOPY (EGD), DILATATION, COMBINED       EXCISE LESION TRUNK N/A 4/17/2017    Procedure: EXCISE LESION TRUNK;  Removal of Abdominal Foreign Body;  Surgeon: Nestor Phoenix MD;  Location: [facility]     HC ESOPH/GAS REFLUX TEST W NASAL IMPED >1 HR N/A 11/19/2015    Procedure: ESOPHAGEAL IMPEDENCE FUNCTION TEST WITH 24 HOUR PH GREATER THAN 1 HOUR;  Surgeon: Thiago Apple MD;  Location: [facility] GI     HC UGI ENDOSCOPY DIAG W BIOPSY  9/17/08     HC UGI ENDOSCOPY DIAG W BIOPSY  9/27/12     HC UGI ENDOSCOPY W ESOPHAGEAL DILATION BALLOON <30MM  9/17/08     HC UGI ENDOSCOPY W EUS N/A 5/5/2015    Procedure: COMBINED ENDOSCOPIC ULTRASOUND, ESOPHAGOSCOPY, GASTROSCOPY, DUODENOSCOPY (EGD);  Surgeon: Wm Dueñas MD;  Location: [facility]     INJECT TRANSVERSUS ABDOMINIS PLANE (TAP) BLOCK BILATERAL Left  9/22/2016    Procedure: INJECT TRANSVERSUS ABDOMINIS PLANE (TAP) BLOCK BILATERAL;  Surgeon: Dickson Corrigan MD;  Location: [facility] OR     laparoscopic pineda  1995     LAPAROSCOPIC PANCREATECTOMY, TRANSPLANT AUTO ISLET CELL N/A 12/28/2016    Procedure: LAPAROSCOPIC PANCREATECTOMY, TRANSPLANT AUTO ISLET CELL;  Surgeon: Nestor Phoenix MD;  Location: [facility] OR     transphenoidal pituitary resection  1990            Social History:     Social History     Occupational History     director [employer]     Social History Main Topics     Smoking status: Former Smoker     Packs/day: 0.50     Years: 6.00     Types: Cigarettes     Start date: 9/1/1985     Quit date: 1/1/1992     Smokeless tobacco: Never Used      Comment: no 2nd hand     Alcohol use No      Comment: last drink 6/2016  - moderate social     Drug use: No     Sexual activity: Yes     Partners: Male     Birth control/ protection: None      Comment: Hystectomy 1999   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 6 years of smoking and quit in 1992. Denies any ETOH.  and lives in the [facility].          Family History:     Family History   Problem Relation Age of Onset     Eye Disorder Father      cataract, detached retina     Myocardial Infarction Father 60     Lipids Father      CEREBROVASCULAR DISEASE Father      Depression Father      Substance Abuse Father      Anesthesia Reaction Father      stroke right after surgery     Lipids Mother      Hypertension Mother      Thyroid Disease Mother      Eye Disorder Son      ptosis     Eye Disorder Paternal Grandmother      cataract     Eye Disorder Paternal Grandfather      cataract     DIABETES Paternal Grandfather      Eye Disorder Maternal Grandmother      cataract     Thyroid Disease Maternal Grandmother      Coronary Artery Disease Sister      Depression Sister      Depression Son      Anxiety Disorder Son      Thyroid Disease Sister      DIABETES Maternal Grandfather      Depression Nephew       Anxiety Disorder Nephew      Thyroid Disease Nephew      Type 2 Diabetes Cousin      paternal cousin     Father with likely RA following with multiple rheumatologists. 3 children healthy. 1 sister with thyroid issues. Denies any Sjogren's SLE, Scleroderma.            Allergies:     Allergies   Allergen Reactions     Apple Anaphylaxis     Depakote [Divalproex Sodium] Other (See Comments)     Chest pain     Zithromax [Azithromycin Dihydrate] Anaphylaxis     Noted in 4/7/08 ER     Darvocet [Propoxyphene N-Apap] Itching     Morphine Nausea and Vomiting and Rash     Nalbuphine Hcl Rash     RASH :nubaine     Zosyn [Piperacillin-Tazobactam In D5w] Rash     Possible allergy, did have a diffuse rash that seemed drug related but could have also been related to soap in the hospital.      Bactrim [Sulfamethoxazole W-Trimethoprim] Other (See Comments) and Nausea and Vomiting     Severely low liver function.     Cats      Compazine [Prochlorperazine] Other (See Comments)     Twitching. Takes Benedryl and is fine     Corticosteroids Other (See Comments)     All oral,IV and injectable steroids are contraindicated (unless in life threatening situations) in Islet Auto transplant recipients. They can cause irreversible loss of islet cell function. Please contact patients transplant care coordinator Mecca ANGEL @ [phone]/Pager [phone], and Endocrinologist prior to administration.     Dust Mites      Grass      Prednisone Other (See Comments)     Insomnia       Ragweeds      Tape [Adhesive Tape] Blisters     Trees      Zofran [Ondansetron] Other (See Comments)     migraine            Medications:     Current Outpatient Prescriptions   Medication Sig Dispense Refill     scopolamine (TRANSDERM-SCOP, 1.5 MG,) 72 hr patch Place 1 patch onto the skin every 72 hours       Lubiprostone 8 MCG CAPS capsule Take 3 capsules (24mcg ) twice a day or as directed. 180 capsule 0     ursodiol (ACTIGALL) 300 MG capsule Take 1 capsule  (300 mg) by mouth 2 times daily 60 capsule 1     order for DME TENS Unit 1 Device 0     celecoxib (CELEBREX) 100 MG capsule Take 1 capsule (100 mg) by mouth 2 times daily 60 capsule 1     blood glucose monitoring (PAT CONTOUR NEXT) test strip Use to test blood sugar 6-8 times daily or as directed. 200 strip 3     amylase-lipase-protease (CREON 24) 00081 UNITS CPEP per EC capsule Take 3-4 capsules by mouth with meals and 1-2 with snacks. Maximum 15 capsules per day. 450 capsule 6     insulin pen needle (BD KEN U/F) 32G X 4 MM Use 6-8 times per day 100 each 11     cetirizine (ZYRTEC) 10 MG tablet Take 10 mg by mouth daily       insulin aspart (NOVOLOG PENFILL) 100 UNIT/ML injection Administer subcutaneously 0.5 unit per 45 grams of carbohydrates. 3 mL 3     insulin aspart (NOVOLOG PEN) 100 UNIT/ML injection aspart medium correction 1 unit per 50 > 130 every 4 hours    For Pre-Meal  - 179 give 1 unit.   For Pre-Meal  - 230 give 2 units.   For Pre-Meal  - 281 give 3 units.   For Pre-Meal  - 332 give 4 units.  3     amitriptyline (ELAVIL) 10 MG tablet Take 2 tablets (20 mg) by mouth At Bedtime 60 tablet 3     senna-docusate (SENOKOT-S;PERICOLACE) 8.6-50 MG per tablet Take 1 tablet by mouth 2 times daily as needed for constipation 100 tablet      prochlorperazine (COMPAZINE) 5 MG tablet Take two 5 mg tablets by mouth every six hours as needed for nausea. 90 tablet 3     polyethylene glycol (MIRALAX/GLYCOLAX) Packet Take 17 g by mouth daily as needed for constipation 7 packet 11     diphenhydrAMINE (BENADRYL ALLERGY) 25 MG capsule Take 1 capsule (25 mg) by mouth every 6 hours as needed for itching or allergies 56 capsule 0     levothyroxine (SYNTHROID/LEVOTHROID) 125 MCG tablet Take 1 tablet (125 mcg), by mouth daily before breakfast. 1 tablet 0     docusate sodium (COLACE) 100 MG capsule Take 1 capsule (100 mg) by mouth 2 times daily as needed for constipation, 60 capsule 0     multivitamin CF  "formula (CHOICEFUL) capsule Take 1 tablet by mouth daily  11     Blood Glucose Monitoring Suppl (CONTOUR NEXT EZ MONITOR) W/DEVICE KIT 1 Device 6 times daily       LANsoprazole (PREVACID) 30 MG CR capsule Take 1 capsule (30 mg) by mouth daily Take 30-60 minutes before a meal. 30 capsule 11     aspirin 81 MG EC tablet Take 1 tablet (81 mg) by mouth daily 90 tablet 3     Injection Device for insulin (NOVOPEN ECHO) MARCO 1 each daily as needed 1 each 0     glucose 40 % GEL gel Take 15-30 g by mouth every 15 minutes as needed for low blood sugar 3 Tube 2     Sharps Container (Chatosity SHARPS ) MISC 1 Container as needed 1 each 1     blood glucose monitoring (PAT MICROLET) lancets Use to test blood sugar 6-8 times daily or as directed. 1 Box prn     insulin glargine (LANTUS) 100 UNIT/ML injection Inject 5 units subcutaneously every morning. (Patient not taking: Reported on 10/20/2017) 3 mL 3     oxyCODONE (ROXICODONE) 5 MG IR tablet Take 1-2 tablets (5-10 mg) by mouth every 4 hours as needed for moderate to severe pain (Patient not taking: Reported on 10/20/2017) 150 tablet 0     glucagon (GLUCAGON EMERGENCY) 1 MG kit Inject 1 mg into the muscle once for 1 dose 1 mg 1            Physical Exam:   Blood pressure 107/70, pulse 81, height 1.727 m (5' 8\"), weight 68.4 kg (150 lb 14.4 oz), SpO2 96 %, not currently breastfeeding.  Wt Readings from Last 4 Encounters:   10/20/17 68.4 kg (150 lb 14.4 oz)   08/14/17 67.1 kg (148 lb)   08/08/17 66.7 kg (147 lb)   08/04/17 67.2 kg (148 lb 3.2 oz)     Ideal body weight: 63.9 kg (140 lb 14 oz)  Adjusted ideal body weight: 65.7 kg (144 lb 14.2 oz)    Constitutional: well-developed, appearing stated age; cooperative  Eyes: nl EOM, conjunctiva, sclera. No icterus.   ENT: nl external ears, nose, lips, teeth, gums, throat  No mucous membrane lesions, dry saliva pool, no parotid enlargement  Neck: no mass or thyroid enlargement  Resp: lungs clear to auscultation,  CV: RRR, no murmurs, " rubs or gallops, no edema  GI: no ABD mass but epigastric and RUQ tenderness with palpation.  Lymph: no cervical, supraclavicular, epitrochlear nodes    MS: All TMJ, neck, shoulder, elbow, wrist, MCP/PIP/DIP, spine, hip, knee, ankle, and foot MTP/IP joints were examined.   Soft tissue exam - No specific tenderness to palpation of soft tissue points on examination today     Neck - normal range of motion  Shoulder -  Right shoulder with normal ROM in full abduction/adduction and internal/external rotation without pain. Left shoulder with improved abduction and IR and ER on both passive and active ROM. No glenohumeral effusion/warmth, no tenderness bilaterally.   Elbow - full ROM and no joint effusion on exam; nontender bilaterally   Wrist - full ROM and no joint effusion on exam; nontender bilaterally  Hand - T0S0 of bilateral DIPs, PIPs, MCP joints.   Back - no specific spinal or paraspinal tenderness present  Hip - full ROM; nontender bilaterally  Knee - no joint effusion or joint line tenderness on exam; normal range of motion.  Ankle -  Left and right ankle joint lines without swelling or tenderness. Non-tender to palpation  Foot - no focal pain or synovitis on palpation of the MTPs bilaterally    Skin: no nail pitting, alopecia, rash, nodules or lesions  Neuro: nl cranial nerves, strength in both proximal and distal UE and LE.   Psych: nl judgement, orientation, memory, affect.         Data:     Results for orders placed or performed in visit on 10/20/17   Hepatic panel   Result Value Ref Range    Bilirubin Direct 0.1 0.0 - 0.2 mg/dL    Bilirubin Total 0.7 0.2 - 1.3 mg/dL    Albumin 3.7 3.4 - 5.0 g/dL    Protein Total 7.4 6.8 - 8.8 g/dL    Alkaline Phosphatase 161 (H) 40 - 150 U/L    ALT 22 0 - 50 U/L    AST 23 0 - 45 U/L   INR   Result Value Ref Range    INR 1.04 0.86 - 1.14     *Note: Due to a large number of results and/or encounters for the requested time period, some results have not been displayed. A  complete set of results can be found in Results Review.       Recent Labs   Lab Test  04/25/17   1355  04/04/17   0757  02/21/17   1315   12/29/16   0620   06/06/15   1903   10/23/12   1451   WBC  4.4  5.0  4.7   < >  10.7   < >   --    < >   --    RBC  4.38  4.04  4.28   < >  3.13*   < >   --    < >   --    HGB  11.1*  10.4*  11.6*   < >  9.1*   < >   --    < >   --    HCT  36.1  33.4*  37.1   < >  27.7*   < >   --    < >   --    MCV  82  83  87   < >  89   < >   --    < >   --    RDW  17.5*  15.8*  13.0   < >  13.2   < >   --    < >   --    PLT  543*  482*  733*   < >  107*   < >   --    < >   --    ALBUMIN  3.7  3.2*  3.7   < >  2.9*   < >  2.8*   < >  4.4   CRP   --    --    --    --   90.0*   --   71.0*   --   7.2   BUN  16  17  13   < >  7   < >  2*   < >   --     < > = values in this interval not displayed.      Recent Labs   Lab Test  12/20/16   1121  10/23/15   1554  10/09/13   0721   06/25/09   1423   TSH  0.13*  0.57  1.86   < >  0.37*   T4  1.17   --    --    --   1.17    < > = values in this interval not displayed.     Hemoglobin   Date Value Ref Range Status   08/14/2017 12.7 11.7 - 15.7 g/dL Final   08/04/2017 13.9 11.7 - 15.7 g/dL Final   06/20/2017 12.7 11.7 - 15.7 g/dL Final     Urea Nitrogen   Date Value Ref Range Status   08/14/2017 13 7 - 30 mg/dL Final   08/04/2017 17 7 - 30 mg/dL Final   06/20/2017 11 7 - 30 mg/dL Final     Creatinine   Date Value Ref Range Status   05/20/2017 0.6 0.52 - 1.04 mg/dL Final     Sed Rate   Date Value Ref Range Status   10/23/2012 11 0 - 20 mm/h Final   07/27/2006 9 0 - 20 mm/h Final     CRP Inflammation   Date Value Ref Range Status   12/29/2016 90.0 (H) 0.0 - 8.0 mg/L Final   06/06/2015 71.0 (H) 0.0 - 8.0 mg/L Final   10/23/2012 7.2 0.0 - 8.0 mg/L Final     AST   Date Value Ref Range Status   10/20/2017 23 0 - 45 U/L Final   08/14/2017 20 0 - 45 U/L Final   08/04/2017 21 0 - 45 U/L Final     Albumin   Date Value Ref Range Status   10/20/2017 3.7 3.4 - 5.0 g/dL  Final   08/14/2017 3.3 (L) 3.4 - 5.0 g/dL Final   08/04/2017 3.9 3.4 - 5.0 g/dL Final     Alkaline Phosphatase   Date Value Ref Range Status   10/20/2017 161 (H) 40 - 150 U/L Final   08/14/2017 198 (H) 40 - 150 U/L Final   08/04/2017 229 (H) 40 - 150 U/L Final     ALT   Date Value Ref Range Status   10/20/2017 22 0 - 50 U/L Final   08/14/2017 26 0 - 50 U/L Final   08/04/2017 28 0 - 50 U/L Final     Rheumatoid Factor   Date Value Ref Range Status   04/10/2017 <20 <20 IU/mL Final     Recent Labs   Lab Test  10/20/17   0823  08/14/17   0749  08/04/17   1054  06/20/17   0929   12/20/16   1121   10/23/15   1554   10/09/13   0721   WBC   --   6.2  4.5  4.2   < >   --    < >  4.8   < >   --    HGB   --   12.7  13.9  12.7   < >   --    < >  13.3   < >   --    HCT   --   40.0  44.1  40.1   < >   --    < >  39.1   < >   --    MCV   --   92  92  89   < >   --    < >  89   < >   --    PLT   --   436  435  447   < >   --    < >  212   < >   --    BUN   --   13  17  11   < >   --    < >  10   < >   --    TSH   --    --    --    --    --   0.13*   --   0.57   --   1.86   AST  23  20  21  127*   < >   --    < >  56*   < >  53*   ALT  22  26  28  216*   < >   --    < >  79*   < >  64*   ALKPHOS  161*  198*  229*  475*   < >   --    < >  206*   < >  130    < > = values in this interval not displayed.     AMARI undetected  SSA, SSB, cryoglobulin all negative  Normal C3, C4  RF undetected  IGG subclasses with mild IGG4 elevation to 146H -> 89  SPEP with normal pattern and no monoclonal protein seen.    Recent LFT normal with declining Alk Phos. Lowest in past year  IgG4 normalizing    Other studies:   Tissue biopsy 12/28/2016:  FINAL DIAGNOSIS:   A. Spleen, splenectomy:   - Splenic tissue with no significant histologic abnormality     B. Duodenum and pancreas, resection:   - Chronic pancreatitis   - Duodenum with no significant histologic abnormality     C. Pancreas, uncinate process, biopsy:   - Chronic pancreatitis     D. Liver, dome  lesion, needle core biopsy:   - Liver with spindle cell proliferation/lesion, acute and chronic   inflammation and fibrosis   - Focally increased IgG4 positive plasma cells (upto 60/HPF)   - See comment     E. Pancreas, head, biopsy:   - Chronic pancreatitis     F. Pancreas, tail, biopsy:   - Chronic pancreatitis     G. Pancreas, body, biopsy:   - Chronic pancreatitis with fat necrosis     COMMENT:   Sections of the liver lesion show stromal proliferation composed of   bland spindle cell bundle and whorls with associated fibrosis   infiltrated by neutrophils, lymphocytes, plasma cells and macrophages.   Occasional lymphoid aggregates are noted. The surrounding liver   parenchymal tissue show diffuse moderate portal inflammation composed of   lymphocytes and plasma cells.  There is also a mild to moderate   infiltrate of neutrophils.  Reticulin stain shows preserved reticulin   framework.  Trichrome stains highlight the spindle cell proliferation   and show mild portal fibrosis.  Immunostain are performed with   appropriate controls. The spindle cell proliferation is negative for ALK   and show patchy infiltrate of IgG4 positive plasma cells (upto 60/HPF).   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 Possibility of IgG4-related disease cannot be ruled out.   Clinical and radiologic correlation is recommended.     MRI Abdomen:4/22/2017  IMPRESSION:  1. 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  2. No significant change in small wedge-shaped peripheral areas of  persistent enhancement surrounding mildly dilated biliary radicles,  findings most compatible with cholangitis or sequela of previous  infection.  3. Nonspecific subcutaneous fluid collection in the left upper  quadrant amidst the abdominal wall postsurgical changes.    Reviewed Rheumatology lab flowsheet    MRCP 7/13/2017  IMPRESSION:  1. 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  2. There is improvement in the heterogeneous enhancement seen on  comparison exam, though patchy areas of increased T2 signal and  persistent enhancement are seen in the liver suggestive of regions of  fibrosis. Mildly dilated bile ducts in the regions of the liver which  appears somewhat fibrotic, overall similar to prior exam.  3. Postsurgical changes of pancreatectomy, splenectomy and  cholecystectomy.    Lip Biopsy 5/31/2017:  FINAL DIAGNOSIS:   Lip biopsy, lower:   -  Benign minor salivary glands with normal lobular architecture, see   comment     COMMENT:   There is focal mild chronic inflammation.  There is no morphologic   evidence of IgG-4 related disease or Sjogren's disease.

## 2017-10-20 NOTE — NURSING NOTE
Dinora Mcghee      1.  Has the patient received the information for the influenza vaccine? YES    2.  Does the patient have any of the following contraindications?     Allergy to eggs? No     Allergic reaction to previous influenza vaccines? No     Any other problems to previous influenza vaccines? No     Paralyzed by Guillain-Lenora syndrome? No     Currently pregnant? NO     Current moderate or severe illness? No     Allergy to contact lens solution? No    3.  The vaccine has been administered in the usual fashion and the patient was instructed to wait 20 minutes before leaving the building in the event of an allergic reaction: YES    Vaccination given by Meli Bruner CMA.  Recorded by Meli Bruner

## 2017-10-20 NOTE — PROGRESS NOTES
[facility]   Comprehensive Pain Program   Psychology Progress Note    Patient Name: Dinora Mcghee    YOB: 1965   Medical Record Number: [id number]  Date: 10/20/2017              SUBJECTIVE        Interval history:   Shoulder resolved.  No benefit from Lyrica  So weaned  But coming off Lyrica seems to have triggered migraines increased to q day but usually has one every q 2 weeks.    The migraines are associated with N& V  And she knows this but it reminds her CP  And she has flash backs to pancreatitis days.     Dr. Phoenix has recommend RD. Consult re food sensitivities  Its been 12 weeks since last Lyrica dose.    And separate from the migraines she has had some upper abd pain similar to the old pancreatitis pain and ongoing queasiness.  She has been exercising and obtained an exciting new job and the resurgence of sx has really depressed her and triggered anxiety       OBJECTIVE:              Length of Visit: 60 minutes        Mental status: depressed affect         Session objective:   Continued behavioral pain management skill improvement         Behavioral inventions and response:                 Auditory EMDR/Bilateral sound during session   Along with            CBT  On  Trauma and pain patterns in CNS            Breathing Imagery   Along with                Resourced Brainspotting/Eye position therapy [employer]  is pain and fear of relapsing symptoms                                   Resource spot=   Special place and then 5 pt grid   .          Beginning  SUDS =  5              Ending SUDS  =  1  And no nausea                                                              ASSESSMENT              Diagnoses:                                             F43.8      Other stress related disorder                                                               Psychosocial and Contextual Factors: had fluctuating course         Progress toward goals: as expected.              Pain status since onset of  pain services: had fluctuating course           Emotional status since onset of pain services: had fluctuating course       Medication / chemical use concerns: None        PLAN:               Next Appointment: Dinora Thorntona Taz will schedule a follow-up appointment in 1 weeks.         Assignment: Establish a daily routine of stretching and exercise, Learn to understand and follow physical limitations  and auditory EMDR         Objectives / interventions for next session:          Improve pain management skills: Goals include:  Cognitive therapy: create constructive thought processes and beliefs regarding pain and Resource Brainspotting/Eye Position therapy               Maria Elena Abdalla, Ph.D., L.P. ...............10/20/2017 10:56 AM                Medical Psychologist

## 2017-10-20 NOTE — LETTER
10/20/2017      RE: Dinora Mcghee  [address]  [facility] [address]       Rheumatology Clinic Visit-Fellow Note     Dinora Mcghee MRN# [id number]   YOB: 1965 Age: 52 year old     Date of Visit: 10/20/2017   Last seen: 8/4/2017  Primary care provider: Justyna Lawson  Referring Provider: Dr. Ara Lugo with GI  Reason for Referral: Further evaluation and management for possible Sjogren's vs IgG4 related disease in patient with sicca symptoms and evidence of isolated? IgG4 in liver/pancreas.           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   # h/o endometriosis  # h/o pituitary mass with 2/2 DI now s/p transsphenoidal resection in 1990    Discussion:  51 y/o female with longstanding history of chronic pancreatitis s/p pancreatectomy with islet autotransplant 12/2016 who is overall improving but found to have elevated serum and > 60 HPF cells on liver biopsy staining. She has sicca symptoms for the past 5 years but no evidence of Sjogren's vs IgG4 related disease on lip biopsy. No other evidence of IgG4 related activity with no RPF, aortitis, orbital disease, thyroid, pulmonary, or kidney disease. Interestingly, IgG4 disease can impact the hypophysis and she does have a history of pituitary issues back in 1990. Recent meta analysis assessing IgG4 serum sensitivity is 87.2% with 82% specificity (1). Her biopsy containing > 50 IgG4 staining plasma cells is highly suggestive of IgG4 related disease as well and meets recent proposed pathologic recommendations of IgG4 related disease (2). She meets many of the features for IgG4 related disease including tissue biopsy.     In regards to management of her IgG4 Related disease there are no randomized control  trials and all medicines are investigational. The most recent evidence highlights that the IgG4 antibodies are pathologic and that medications that suppress B lymphocytes like CD-20 cells (Rituximab) creating the antibodies are the preferred therapy. This is especially important if the organ dysfunction can be a critical organ like the liver in a patient with recent surgeries and transplant. Currently her liver dysfunction has normalized and there is no evidence of ongoing IgG4 related disease at this time.     1. Daniel Barber et al.  Diagnostic Value of Serum IgG4 for IgG4-Related Disease: A MADELYN-Compliant Systematic Review and Carroll-Analysis.  Ed. Lj Quinn. Medicine 95.21 (2016): e3785. [employer]. Web. 17 May 2017.  2. Annabel V, Ever Y, Donovan HOLMANK, et al. Consensus statement on the pathology of IgG4-RD. Mod Pathol 2012;64:3061-7.    Plan:  -- BMP, Hepatic panel, INR, and CBC w/ diff labs reviewed with the patient in the room.  -- agree with plan to continue to monitor off immune suppressives given ongoing stability in liver labs and IgG4 levels.   -- If liver dysfunction returns would need to decide with GI if we suspect related to IgG4 and if so then therapeutic options for IgG4 related disease would normally be Rituximab preferred (but has steroids (can cause irreversible damage to islet cells) with infusion) vs MMF (no steroids but not ideal agent).  -- Patient deferred initiating Evoxac or restasis at this point in time.  -- discussed various lozenges that help sicca symptoms    Follow up:  RTC in 5 months, unless new symptoms emerge. Labs in 2-3 months to monitor for possible IgG4 involvement.    Prevention:  Pneumovax PPSV23: 12/2016  PCV13 (Prevnar): not in [facility]  Tdap: 1/7/2008?  Influenza today received 10/20/17  Shingles vaccine: not recieved  Reminded if on biologic no live vaccine (flu mist, shingles): n/a  HBVsAg: negative  HBVcore: ordered  HCV: negative   Q gold (or T spot): ordered  Bactrim  prophylaxis: n/a  Fungal prophylaxis: n/a  Vitamin D and calcium/bone health: Ca and Vitamin D    Immunizations:  Immunization History   Administered Date(s) Administered     HIB 12/01/2016     Influenza (IIV3) 09/29/2011, 11/15/2014, 10/21/2016     Influenza (intradermal) 11/01/2003, 09/29/2011     Influenza Vaccine IM 3yrs+ 4 Valent IIV4 10/24/2015, 10/21/2016     Influenza Vaccine, 3 YRS +, IM (QUADRIVALENT W/PRESERVATIVES) 11/04/2013     Meningococcal (Bexsero ) 12/09/2016     Meningococcal (Menactra ) 12/01/2016     Pneumococcal 23 valent 12/01/2016     TD (ADULT, 7+) 07/09/1998     TDAP Vaccine (Adacel) 01/07/2008     Discussed with attending Dr. Becerra, who agrees with the above assessment and plan.    Jesse Richardson DO  Rheumatology Fellow  Pager: [phone]    Attending Note: I saw and evaluated the patient with Dylan. I agree with the assessment and plan.    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  Document previously scanned on 1/12/17.  Validation form completed and scanned.  Code Status reflects choices in most recent ACP document..  Confirmed/documented designated decision maker(s).  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     Priority: Medium      Acute on chronic pancreatitis (H) 09/03/2016     Priority: Medium     Acute abdominal pain 08/02/2016     Priority: Medium     Abdominal pain, epigastric 10/23/2015     Priority: Medium     Severe protein-calorie malnutrition (H) 06/09/2015     Priority: Medium     Acute pancreatitis 06/03/2015     Priority: Medium     Abdominal pain 06/02/2015     Priority: Medium     S/P ERCP 06/02/2015     Priority: Medium     Abdominal pain, generalized 04/20/2015     Priority: Medium     History of ERCP 04/20/2015     Priority: Medium     Other type of intractable migraine      Priority: Medium     Diagnosis updated by automated process. Provider to review and confirm.       GERD (gastroesophageal reflux disease) 12/01/2010     Priority: Medium     Lumbago 04/18/2005     Priority: Medium     History of L5-S1 degenerative disk disease.         Sprain and strain of other specified sites of hip and thigh 12/09/2002     Priority: Medium     Chondromalacia of patella 12/09/2002     Priority: Medium     Need for prophylactic immunotherapy      Priority: Medium     trees, grass, srw, dust mites, cat       Allergic rhinitis due to other allergen 12/21/2001     Priority: Medium     Hypothyroidism      Priority: Medium     Problem list name updated by automated process. Provider to review       Sensorineural hearing loss 01/10/2005     Priority: Medium     Problem list name updated by automated process. Provider to review       Vertiginous syndrome and labyrinthine disorder 01/10/2005     Priority: Medium     Problem list name updated by automated process. Provider to review              History of Present Illness:   Dinora Mcghee is a 52 year old female with a past medical history of hypothyroidism, h/o pituitary mass and secondary DI s/p transphenoidal resection 1990, HLD, left knee OA, lumbar DDD, chronic pancreatitis for 30 years, now s/p islet cell transplantation who presents today for further evaluation of elevated IgG4  "found both in serum and liver biopsy as well as sicca symptoms.     She notes that she has had longstanding issues with pancreatitis without any mention of aortitis, RPF, urinary obstruction. She denies any history of cancers, lymphoma or leukemia. No family history of autoimmune conditions other than possible RA in her dad. She had distant DI secondary from pituitary mass (s/p removal and not needing hormones) and joint wise has longstanding OA of left knee. She mentions that for the past 5 years she has had dry eye requiring eye drops. Sometimes related to her allergies she has to now regularly use eye drops. \"Feels like sand\" is rubbing in eyes. She also noticed ~ 5 years ago dry mouth where a cracker would not dissolve. She has to constantly carry water and drink it to keep her mouth dry. She uses sugar free gum and lozenges. Most of her clinical symptom burden has been related to her pancreas with frequent ERCP and concern for Sphincter of Oddi dysfunction. She had issues with significant pancreatic insuffiencey with malnutrition and weight loss. She underwent islet cell autotransplantation 12/28/2017 and was found to have elevated IgG4 cells in her liver biopsy. Subsequent IgG4 levels were elevated.    Interim history: 6/20/2017 - 8/4/2017  Comes in today frustrated about the multiple denials from the insurance about Rituximab for her IgG4 Related disease. She has had recent elevations of her LFTs and mentions that the last 3 days she has started to feel worse like before her pancreatectomy. Some nausea, no vomiting, but her appetite is lower. She denies any jaundice, but does not itching of her skin. She denies any fevers, chills, but has had 1 day of watery, nonbloody diarrhea. She had a recent MRCP with signs of biliary dilatation at ducts within the liver. She has a new hyperenhancement lesion in her liver with stability of other lesions. She is following with Dr. Lugo within the next week. She " mentions that her dry eyes are getting worse.     Interim history: 8/4/2017 - 10/20/2017  After further discussion with GI and SOT regarding therapies Dinora's LFTs normalized and the consensus was to monitor and hold on immune suppressive therapies given the side effect profile. She has continued to improve and is now off insulin. She wonders about her known slow emptying an gastroparesis has been acting up and she has had issues with N/V the past 2 weeks particularly after she lays down (worse in AM, but best midday). Otherwise things have been going well, denies any infections and her shoulder is almost back to normal.          Review of Systems:   Constitutional: denies fevers/chills, fatigue, patient is having weight gain s/p transplant.  Skin: denies rash, raynauds or alopecia  Eyes: denies hx of uvetitis, double vision, positive dry eyes  Ears/Nose/Throat:  denies recurrent URI or sinusitis, positive dry mouth, denies any oral or nasal ulcers  Respiratory: No shortness of breath, dyspnea on exertion, cough, or hemoptysis  Cardiovascular:  denies chest pain, palpitations, hx pleurisy  Gastrointestinal: denies diarrhea, blood in stool, hx of IBD, positive past pancreatitis, positive nausea, rare vomiting, positive RUQ pain.  Genitourinary: denies hematuria.  Musculoskeletal: denies red, tender, swollen joints, left shoulder frozen shoulder since surgery almost normal.  Neurologic:  denies headaches, seizures, some numbness or tingling s/p abdominal surgery.  Psychiatric:  denies history of depression or mental illness  Hematologic/Lymphatic/Immunologic:  denies history of blood clots, easy bruising, bleeding.  Endocrine:  Positive hypothyroidism.          Past Medical History:     Past Medical History:   Diagnosis Date     Allergic rhinitis, cause unspecified      Allergy to other foods     strawberries, apples, celeries, alice, watermelon     Arthritis     left knee     Choledocholithiasis     long after  cholecystectomy     Chronic abdominal pain      Chronic constipation      Chronic nausea      Chronic pancreatitis (H)      Degeneration of lumbar or lumbosacral intervertebral disc      Esophageal reflux     w/ hiatal hernia     Gastroparesis      Hiatal hernia      History of pituitary adenoma     s/p resection     Hypothyroidism      Migraines      Mild hyperlipidemia      On tube feeding diet     presence of GJ tube     Pancreatic disease     PD stricture, 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    Past Surgical History  Past Surgical History:   Procedure Laterality Date     ABDOMEN SURGERY      c sections: 93, 96, 98. endometriosis growth     APPENDECTOMY       C  DELIVERY ONLY       C  DELIVERY ONLY      repeat c section with incidental cystotomy with repair     C EXCIS PITUITARY,TRANSNASAL/SEPTAL      pituitary tumor removed for diabetes insipidus     C TOTAL ABDOM HYSTERECTOMY      w/ bilateral salpingoophorectomy      C WRIST ARTHROSCOP,RELEASE XVERS LIG Bilateral 08      SECTION       COLONOSCOPY       ENDOSCOPIC RETROGRADE CHOLANGIOPANCREATOGRAM N/A 2015    Procedure: ENDOSCOPIC RETROGRADE CHOLANGIOPANCREATOGRAM;  Surgeon: Mandeep Park MD;  [facility]: [facility]     ENDOSCOPIC RETROGRADE CHOLANGIOPANCREATOGRAM N/A 2016    Procedure: COMBINED ENDOSCOPIC RETROGRADE CHOLANGIOPANCREATOGRAPHY, PLACE TUBE/STENT;  Surgeon: Mandeep Park MD;  [facility]     ENDOSCOPIC RETROGRADE CHOLANGIOPANCREATOGRAM N/A 3/17/2016    Procedure: COMBINED ENDOSCOPIC RETROGRADE CHOLANGIOPANCREATOGRAPHY, REMOVE FOREIGN BODY OR STENT/TUBE;  Surgeon: Mandeep Park MD;  [facility]     ENDOSCOPIC RETROGRADE CHOLANGIOPANCREATOGRAM N/A 2016    Procedure: COMBINED ENDOSCOPIC  RETROGRADE CHOLANGIOPANCREATOGRAPHY, PLACE TUBE/STENT;  Surgeon: Mandeep Park MD;  [facility]     ENDOSCOPIC RETROGRADE CHOLANGIOPANCREATOGRAM N/A 8/26/2016    Procedure: COMBINED ENDOSCOPIC RETROGRADE CHOLANGIOPANCREATOGRAPHY, REMOVE FOREIGN BODY OR STENT/TUBE;  Surgeon: Mandeep Park MD;  [facility]     ENDOSCOPIC ULTRASOUND UPPER GASTROINTESTINAL TRACT (GI) N/A 10/3/2016    Procedure: ENDOSCOPIC ULTRASOUND, ESOPHAGOSCOPY / UPPER GASTROINTESTINAL TRACT (GI);  Surgeon: Guru Jose Rodas MD;  [facility]     ESOPHAGOSCOPY, GASTROSCOPY, DUODENOSCOPY (EGD), COMBINED N/A 6/24/2015    Procedure: COMBINED ESOPHAGOSCOPY, GASTROSCOPY, DUODENOSCOPY (EGD), REMOVE FOREIGN BODY;  Surgeon: Mandeep Park MD;  [facility]     ESOPHAGOSCOPY, GASTROSCOPY, DUODENOSCOPY (EGD), COMBINED N/A 10/25/2015    Procedure: COMBINED ESOPHAGOSCOPY, GASTROSCOPY, DUODENOSCOPY (EGD);  Surgeon: Sammy Amaro MD;  [facility]     ESOPHAGOSCOPY, GASTROSCOPY, DUODENOSCOPY (EGD), COMBINED N/A 10/25/2015    Procedure: COMBINED ESOPHAGOSCOPY, GASTROSCOPY, DUODENOSCOPY (EGD), BIOPSY SINGLE OR MULTIPLE;  Surgeon: Sammy Amaro MD;  [facility]: [facility]     ESOPHAGOSCOPY, GASTROSCOPY, DUODENOSCOPY (EGD), DILATATION, COMBINED       EXCISE LESION TRUNK N/A 4/17/2017    Procedure: EXCISE LESION TRUNK;  Removal of Abdominal Foreign Body;  Surgeon: Nestor Phoenix MD;  [facility]     HC ESOPH/GAS REFLUX TEST W NASAL IMPED >1 HR N/A 11/19/2015    Procedure: ESOPHAGEAL IMPEDENCE FUNCTION TEST WITH 24 HOUR PH GREATER THAN 1 HOUR;  Surgeon: Thiago Apple MD;  Location: [facility]     HC [facility] ENDOSCOPY DIAG W BIOPSY  9/17/08     HC UGI ENDOSCOPY DIAG W BIOPSY  9/27/12     HC UGI ENDOSCOPY W ESOPHAGEAL DILATION BALLOON <30MM  9/17/08     HC UGI ENDOSCOPY W EUS N/A 5/5/2015    Procedure: COMBINED ENDOSCOPIC ULTRASOUND, ESOPHAGOSCOPY, GASTROSCOPY, DUODENOSCOPY (EGD);  Surgeon: Spenser  Wm Berg MD;  Location: [facility]     INJECT TRANSVERSUS ABDOMINIS PLANE (TAP) BLOCK BILATERAL Left 9/22/2016    Procedure: INJECT TRANSVERSUS ABDOMINIS PLANE (TAP) BLOCK BILATERAL;  Surgeon: Dickson Corrigan MD;  [facility]     laparoscopic pineda  1995     LAPAROSCOPIC PANCREATECTOMY, TRANSPLANT AUTO ISLET CELL N/A 12/28/2016    Procedure: LAPAROSCOPIC PANCREATECTOMY, TRANSPLANT AUTO ISLET CELL;  Surgeon: Nestor Phoenix MD;  [facility]: [facility]     transphenoidal pituitary resection  1990            Social History:     Social History     Occupational History     director [employer]     Social History Main Topics     Smoking status: Former Smoker     Packs/day: 0.50     Years: 6.00     Types: Cigarettes     Start date: 9/1/1985     Quit date: 1/1/1992     Smokeless tobacco: Never Used      Comment: no 2nd hand     Alcohol use No      Comment: last drink 6/2016  - moderate social     Drug use: No     Sexual activity: Yes     Partners: Male     Birth control/ protection: None      Comment: Hystectomy 1999   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 6 years of smoking and quit in 1992. Denies any ETOH.  and lives in the [facility].          Family History:     Family History   Problem Relation Age of Onset     Eye Disorder Father      cataract, detached retina     Myocardial Infarction Father 60     Lipids Father      CEREBROVASCULAR DISEASE Father      Depression Father      Substance Abuse Father      Anesthesia Reaction Father      stroke right after surgery     Lipids Mother      Hypertension Mother      Thyroid Disease Mother      Eye Disorder Son      ptosis     Eye Disorder Paternal Grandmother      cataract     Eye Disorder Paternal Grandfather      cataract     DIABETES Paternal Grandfather      Eye Disorder Maternal Grandmother      cataract     Thyroid Disease Maternal Grandmother      Coronary Artery Disease Sister      Depression Sister      Depression Son      Anxiety Disorder  Son      Thyroid Disease Sister      DIABETES Maternal Grandfather      Depression Nephew      Anxiety Disorder Nephew      Thyroid Disease Nephew      Type 2 Diabetes Cousin      paternal cousin     Father with likely RA following with multiple rheumatologists. 3 children healthy. 1 sister with thyroid issues. Denies any Sjogren's SLE, Scleroderma.            Allergies:     Allergies   Allergen Reactions     Apple Anaphylaxis     Depakote [Divalproex Sodium] Other (See Comments)     Chest pain     Zithromax [Azithromycin Dihydrate] Anaphylaxis     Noted in 4/7/08 ER     Darvocet [Propoxyphene N-Apap] Itching     Morphine Nausea and Vomiting and Rash     Nalbuphine Hcl Rash     RASH :nubaine     Zosyn [Piperacillin-Tazobactam In D5w] Rash     Possible allergy, did have a diffuse rash that seemed drug related but could have also been related to soap in the hospital.      Bactrim [Sulfamethoxazole W-Trimethoprim] Other (See Comments) and Nausea and Vomiting     Severely low liver function.     Cats      Compazine [Prochlorperazine] Other (See Comments)     Twitching. Takes Benedryl and is fine     Corticosteroids Other (See Comments)     All oral,IV and injectable steroids are contraindicated (unless in life threatening situations) in Islet Auto transplant recipients. They can cause irreversible loss of islet cell function. Please contact patients transplant care coordinator Mecca Watkins RN BSN @ [phone]/Pager [phone], and Endocrinologist prior to administration.     Dust Mites      Grass      Prednisone Other (See Comments)     Insomnia       Ragweeds      Tape [Adhesive Tape] Blisters     Trees      Zofran [Ondansetron] Other (See Comments)     migraine            Medications:     Current Outpatient Prescriptions   Medication Sig Dispense Refill     scopolamine (TRANSDERM-SCOP, 1.5 MG,) 72 hr patch Place 1 patch onto the skin every 72 hours       Lubiprostone 8 MCG CAPS capsule Take 3 capsules (24mcg )  twice a day or as directed. 180 capsule 0     ursodiol (ACTIGALL) 300 MG capsule Take 1 capsule (300 mg) by mouth 2 times daily 60 capsule 1     order for DME TENS Unit 1 Device 0     celecoxib (CELEBREX) 100 MG capsule Take 1 capsule (100 mg) by mouth 2 times daily 60 capsule 1     blood glucose monitoring (PAT CONTOUR NEXT) test strip Use to test blood sugar 6-8 times daily or as directed. 200 strip 3     amylase-lipase-protease (CREON 24) 71202 UNITS CPEP per EC capsule Take 3-4 capsules by mouth with meals and 1-2 with snacks. Maximum 15 capsules per day. 450 capsule 6     insulin pen needle (BD KEN U/F) 32G X 4 MM Use 6-8 times per day 100 each 11     cetirizine (ZYRTEC) 10 MG tablet Take 10 mg by mouth daily       insulin aspart (NOVOLOG PENFILL) 100 UNIT/ML injection Administer subcutaneously 0.5 unit per 45 grams of carbohydrates. 3 mL 3     insulin aspart (NOVOLOG PEN) 100 UNIT/ML injection aspart medium correction 1 unit per 50 > 130 every 4 hours    For Pre-Meal  - 179 give 1 unit.   For Pre-Meal  - 230 give 2 units.   For Pre-Meal  - 281 give 3 units.   For Pre-Meal  - 332 give 4 units.  3     amitriptyline (ELAVIL) 10 MG tablet Take 2 tablets (20 mg) by mouth At Bedtime 60 tablet 3     senna-docusate (SENOKOT-S;PERICOLACE) 8.6-50 MG per tablet Take 1 tablet by mouth 2 times daily as needed for constipation 100 tablet      prochlorperazine (COMPAZINE) 5 MG tablet Take two 5 mg tablets by mouth every six hours as needed for nausea. 90 tablet 3     polyethylene glycol (MIRALAX/GLYCOLAX) Packet Take 17 g by mouth daily as needed for constipation 7 packet 11     diphenhydrAMINE (BENADRYL ALLERGY) 25 MG capsule Take 1 capsule (25 mg) by mouth every 6 hours as needed for itching or allergies 56 capsule 0     levothyroxine (SYNTHROID/LEVOTHROID) 125 MCG tablet Take 1 tablet (125 mcg), by mouth daily before breakfast. 1 tablet 0     docusate sodium (COLACE) 100 MG capsule Take 1  "capsule (100 mg) by mouth 2 times daily as needed for constipation, 60 capsule 0     multivitamin CF formula (CHOICEFUL) capsule Take 1 tablet by mouth daily  11     Blood Glucose Monitoring Suppl (CONTOUR NEXT EZ MONITOR) W/DEVICE KIT 1 Device 6 times daily       LANsoprazole (PREVACID) 30 MG CR capsule Take 1 capsule (30 mg) by mouth daily Take 30-60 minutes before a meal. 30 capsule 11     aspirin 81 MG EC tablet Take 1 tablet (81 mg) by mouth daily 90 tablet 3     Injection Device for insulin (NOVOPEN ECHO) MARCO 1 each daily as needed 1 each 0     glucose 40 % GEL gel Take 15-30 g by mouth every 15 minutes as needed for low blood sugar 3 Tube 2     Sharps Container (Storybird SHARPS ) MISC 1 Container as needed 1 each 1     blood glucose monitoring (Netseer MICROLET) lancets Use to test blood sugar 6-8 times daily or as directed. 1 Box prn     insulin glargine (LANTUS) 100 UNIT/ML injection Inject 5 units subcutaneously every morning. (Patient not taking: Reported on 10/20/2017) 3 mL 3     oxyCODONE (ROXICODONE) 5 MG IR tablet Take 1-2 tablets (5-10 mg) by mouth every 4 hours as needed for moderate to severe pain (Patient not taking: Reported on 10/20/2017) 150 tablet 0     glucagon (GLUCAGON EMERGENCY) 1 MG kit Inject 1 mg into the muscle once for 1 dose 1 mg 1            Physical Exam:   Blood pressure 107/70, pulse 81, height 1.727 m (5' 8\"), weight 68.4 kg (150 lb 14.4 oz), SpO2 96 %, not currently breastfeeding.  Wt Readings from Last 4 Encounters:   10/20/17 68.4 kg (150 lb 14.4 oz)   08/14/17 67.1 kg (148 lb)   08/08/17 66.7 kg (147 lb)   08/04/17 67.2 kg (148 lb 3.2 oz)     Ideal body weight: 63.9 kg (140 lb 14 oz)  Adjusted ideal body weight: 65.7 kg (144 lb 14.2 oz)    Constitutional: well-developed, appearing stated age; cooperative  Eyes: nl EOM, conjunctiva, sclera. No icterus.   ENT: nl external ears, nose, lips, teeth, gums, throat  No mucous membrane lesions, dry saliva pool, no parotid " enlargement  Neck: no mass or thyroid enlargement  Resp: lungs clear to auscultation,  CV: RRR, no murmurs, rubs or gallops, no edema  GI: no ABD mass but epigastric and RUQ tenderness with palpation.  Lymph: no cervical, supraclavicular, epitrochlear nodes    MS: All TMJ, neck, shoulder, elbow, wrist, MCP/PIP/DIP, spine, hip, knee, ankle, and foot MTP/IP joints were examined.   Soft tissue exam - No specific tenderness to palpation of soft tissue points on examination today     Neck - normal range of motion  Shoulder -  Right shoulder with normal ROM in full abduction/adduction and internal/external rotation without pain. Left shoulder with improved abduction and IR and ER on both passive and active ROM. No glenohumeral effusion/warmth, no tenderness bilaterally.   Elbow - full ROM and no joint effusion on exam; nontender bilaterally   Wrist - full ROM and no joint effusion on exam; nontender bilaterally  Hand - T0S0 of bilateral DIPs, PIPs, MCP joints.   Back - no specific spinal or paraspinal tenderness present  Hip - full ROM; nontender bilaterally  Knee - no joint effusion or joint line tenderness on exam; normal range of motion.  Ankle -  Left and right ankle joint lines without swelling or tenderness. Non-tender to palpation  Foot - no focal pain or synovitis on palpation of the MTPs bilaterally    Skin: no nail pitting, alopecia, rash, nodules or lesions  Neuro: nl cranial nerves, strength in both proximal and distal UE and LE.   Psych: nl judgement, orientation, memory, affect.         Data:     Results for orders placed or performed in visit on 10/20/17   Hepatic panel   Result Value Ref Range    Bilirubin Direct 0.1 0.0 - 0.2 mg/dL    Bilirubin Total 0.7 0.2 - 1.3 mg/dL    Albumin 3.7 3.4 - 5.0 g/dL    Protein Total 7.4 6.8 - 8.8 g/dL    Alkaline Phosphatase 161 (H) 40 - 150 U/L    ALT 22 0 - 50 U/L    AST 23 0 - 45 U/L   INR   Result Value Ref Range    INR 1.04 0.86 - 1.14     *Note: Due to a large  number of results and/or encounters for the requested time period, some results have not been displayed. A complete set of results can be found in Results Review.       Recent Labs   Lab Test  04/25/17   1355  04/04/17   0757  02/21/17   1315   12/29/16   0620   06/06/15   1903   10/23/12   1451   WBC  4.4  5.0  4.7   < >  10.7   < >   --    < >   --    RBC  4.38  4.04  4.28   < >  3.13*   < >   --    < >   --    HGB  11.1*  10.4*  11.6*   < >  9.1*   < >   --    < >   --    HCT  36.1  33.4*  37.1   < >  27.7*   < >   --    < >   --    MCV  82  83  87   < >  89   < >   --    < >   --    RDW  17.5*  15.8*  13.0   < >  13.2   < >   --    < >   --    PLT  543*  482*  733*   < >  107*   < >   --    < >   --    ALBUMIN  3.7  3.2*  3.7   < >  2.9*   < >  2.8*   < >  4.4   CRP   --    --    --    --   90.0*   --   71.0*   --   7.2   BUN  16  17  13   < >  7   < >  2*   < >   --     < > = values in this interval not displayed.      Recent Labs   Lab Test  12/20/16   1121  10/23/15   1554  10/09/13   0721   06/25/09   1423   TSH  0.13*  0.57  1.86   < >  0.37*   T4  1.17   --    --    --   1.17    < > = values in this interval not displayed.     Hemoglobin   Date Value Ref Range Status   08/14/2017 12.7 11.7 - 15.7 g/dL Final   08/04/2017 13.9 11.7 - 15.7 g/dL Final   06/20/2017 12.7 11.7 - 15.7 g/dL Final     Urea Nitrogen   Date Value Ref Range Status   08/14/2017 13 7 - 30 mg/dL Final   08/04/2017 17 7 - 30 mg/dL Final   06/20/2017 11 7 - 30 mg/dL Final     Creatinine   Date Value Ref Range Status   05/20/2017 0.6 0.52 - 1.04 mg/dL Final     Sed Rate   Date Value Ref Range Status   10/23/2012 11 0 - 20 mm/h Final   07/27/2006 9 0 - 20 mm/h Final     CRP Inflammation   Date Value Ref Range Status   12/29/2016 90.0 (H) 0.0 - 8.0 mg/L Final   06/06/2015 71.0 (H) 0.0 - 8.0 mg/L Final   10/23/2012 7.2 0.0 - 8.0 mg/L Final     AST   Date Value Ref Range Status   10/20/2017 23 0 - 45 U/L Final   08/14/2017 20 0 - 45 U/L Final    08/04/2017 21 0 - 45 U/L Final     Albumin   Date Value Ref Range Status   10/20/2017 3.7 3.4 - 5.0 g/dL Final   08/14/2017 3.3 (L) 3.4 - 5.0 g/dL Final   08/04/2017 3.9 3.4 - 5.0 g/dL Final     Alkaline Phosphatase   Date Value Ref Range Status   10/20/2017 161 (H) 40 - 150 U/L Final   08/14/2017 198 (H) 40 - 150 U/L Final   08/04/2017 229 (H) 40 - 150 U/L Final     ALT   Date Value Ref Range Status   10/20/2017 22 0 - 50 U/L Final   08/14/2017 26 0 - 50 U/L Final   08/04/2017 28 0 - 50 U/L Final     Rheumatoid Factor   Date Value Ref Range Status   04/10/2017 <20 <20 IU/mL Final     Recent Labs   Lab Test  10/20/17   0823  08/14/17   0749  08/04/17   1054  06/20/17   0929   12/20/16   1121   10/23/15   1554   10/09/13   0721   WBC   --   6.2  4.5  4.2   < >   --    < >  4.8   < >   --    HGB   --   12.7  13.9  12.7   < >   --    < >  13.3   < >   --    HCT   --   40.0  44.1  40.1   < >   --    < >  39.1   < >   --    MCV   --   92  92  89   < >   --    < >  89   < >   --    PLT   --   436  435  447   < >   --    < >  212   < >   --    BUN   --   13  17  11   < >   --    < >  10   < >   --    TSH   --    --    --    --    --   0.13*   --   0.57   --   1.86   AST  23  20  21  127*   < >   --    < >  56*   < >  53*   ALT  22  26  28  216*   < >   --    < >  79*   < >  64*   ALKPHOS  161*  198*  229*  475*   < >   --    < >  206*   < >  130    < > = values in this interval not displayed.     AMARI undetected  SSA, SSB, cryoglobulin all negative  Normal C3, C4  RF undetected  IGG subclasses with mild IGG4 elevation to 146H -> 89  SPEP with normal pattern and no monoclonal protein seen.    Recent LFT normal with declining Alk Phos. Lowest in past year  IgG4 normalizing    Other studies:   Tissue biopsy 12/28/2016:  FINAL DIAGNOSIS:   A. Spleen, splenectomy:   - Splenic tissue with no significant histologic abnormality     B. Duodenum and pancreas, resection:   - Chronic pancreatitis   - Duodenum with no significant  histologic abnormality     C. Pancreas, uncinate process, biopsy:   - Chronic pancreatitis     D. Liver, dome lesion, needle core biopsy:   - Liver with spindle cell proliferation/lesion, acute and chronic   inflammation and fibrosis   - Focally increased IgG4 positive plasma cells (upto 60/HPF)   - See comment     E. Pancreas, head, biopsy:   - Chronic pancreatitis     F. Pancreas, tail, biopsy:   - Chronic pancreatitis     G. Pancreas, body, biopsy:   - Chronic pancreatitis with fat necrosis     COMMENT:   Sections of the liver lesion show stromal proliferation composed of   bland spindle cell bundle and whorls with associated fibrosis   infiltrated by neutrophils, lymphocytes, plasma cells and macrophages.   Occasional lymphoid aggregates are noted. The surrounding liver   parenchymal tissue show diffuse moderate portal inflammation composed of   lymphocytes and plasma cells.  There is also a mild to moderate   infiltrate of neutrophils.  Reticulin stain shows preserved reticulin   framework.  Trichrome stains highlight the spindle cell proliferation   and show mild portal fibrosis.  Immunostain are performed with   appropriate controls. The spindle cell proliferation is negative for ALK   and show patchy infiltrate of IgG4 positive plasma cells (upto 60/HPF).   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 Possibility of IgG4-related disease cannot be ruled out.   Clinical and radiologic correlation is recommended.     MRI Abdomen:4/22/2017  IMPRESSION:  1. 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  2. No significant change in small wedge-shaped peripheral areas of  persistent enhancement surrounding mildly dilated biliary radicles,  findings most compatible with cholangitis or sequela of previous  infection.  3. Nonspecific subcutaneous fluid collection in the left upper  quadrant amidst the abdominal wall postsurgical  changes.    Reviewed Rheumatology lab flowsheet    MRCP 7/13/2017  IMPRESSION:  1. Findings consistent with hemosiderin deposition within the liver.  2. There is improvement in the heterogeneous enhancement seen on  comparison exam, though patchy areas of increased T2 signal and  persistent enhancement are seen in the liver suggestive of regions of  fibrosis. Mildly dilated bile ducts in the regions of the liver which  appears somewhat fibrotic, overall similar to prior exam.  3. Postsurgical changes of pancreatectomy, splenectomy and  cholecystectomy.    Lip Biopsy 5/31/2017:  FINAL DIAGNOSIS:   Lip biopsy, lower:   -  Benign minor salivary glands with normal lobular architecture, see   comment     COMMENT:   There is focal mild chronic inflammation.  There is no morphologic   evidence of IgG-4 related disease or Sjogren's disease.         Jesse Richardson, DO

## 2017-10-20 NOTE — NURSING NOTE
"Chief Complaint   Patient presents with     Port Draw     labs drawn from port by rn     Port accessed with 20 gauge 3/4\" gripper needle and labs drawn by rn.  Port flushed with NS and heparin and port de-accessed.  Pt tolerated well.   Jaja Russell RN    "

## 2017-10-20 NOTE — PATIENT INSTRUCTIONS
-- lets get labs in 2-3 months and see each other again in around 5 months  -- We agree with and recommend the continued GI evaluation for the nausea and vomiting.  -- great news your liver labs are looking the best the past 1 year.

## 2017-10-20 NOTE — NURSING NOTE
"Chief Complaint   Patient presents with     RECHECK     Follow up for dry eyes and mouth/IgG4       Initial /70  Pulse 81  Ht 1.727 m (5' 8\")  Wt 68.4 kg (150 lb 14.4 oz)  SpO2 96%  BMI 22.94 kg/m2 Estimated body mass index is 22.94 kg/(m^2) as calculated from the following:    Height as of this encounter: 1.727 m (5' 8\").    Weight as of this encounter: 68.4 kg (150 lb 14.4 oz).  Medication Reconciliation: complete   Meli Bruner CMA    "

## 2017-10-20 NOTE — MR AVS SNAPSHOT
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  -- great news your liver labs are looking the best the past 1 year.          Follow-ups after your visit        Follow-up notes from your care team     Return in about 5 months (around 3/20/2018).      Your next 10 appointments already scheduled     Oct 20, 2017 11:00 AM CDT   (Arrive by 10:45 AM)   Return Visit with Maria Elena Abdalla, PhD   [facility] ([facility])    [address] [place] [address]  [address] [facility] [address] [phone]            Mar 16, 2018  9:00 AM CDT   (Arrive by 8:45 AM)   Return Visit with Jesse Richardson DO   [facility] Rheumatology ([facility])    [address] [place] [address]  [address] [facility] [address] [phone]              Future tests that were ordered for you today     Open Standing Orders        Priority Remaining Interval Expires Ordered    Immunoglobulin G subclasses Routine 9/9 2 months 10/20/2018 10/20/2017            Who to contact     If you have questions or need follow up information about today's clinic visit or your schedule please contact [facility] RHEUMATOLOGY directly at [phone].  Normal or non-critical lab and imaging results will be communicated to you by MyChart, letter or phone within 4 business days after the clinic has received the results. If you do not hear from us within 7 days, please  "contact the clinic through [facility] or phone. If you have a critical or abnormal lab result, we will notify you by phone as soon as possible.  Submit refill requests through [facility] or call your pharmacy and they will forward the refill request to us. Please allow 3 business days for your refill to be completed.          Additional Information About Your Visit        [facility] Information     [facility] gives you secure access to your electronic health record. If you see a primary care provider, you can also send messages to your care team and make appointments. If you have questions, please call your primary care clinic.  If you do not have a primary care provider, please call [phone] and they will assist you.        Care EveryWhere ID     This is your Care EveryWhere ID. This could be used by other organizations to access your [facility] medical records  [id number]        Your Vitals Were     Pulse Height Pulse Oximetry BMI (Body Mass Index)          81 1.727 m (5' 8\") 96% 22.94 kg/m2         Blood Pressure from Last 3 Encounters:   10/20/17 107/70   08/14/17 109/65   08/08/17 113/75    Weight from Last 3 Encounters:   10/20/17 68.4 kg (150 lb 14.4 oz)   08/14/17 67.1 kg (148 lb)   08/08/17 66.7 kg (147 lb)               Primary Care Provider Office Phone # Fax #    Justyna Lawson [phone]       [facility] [place] [address]  [place] [address]        Equal Access to Services     [facility] AH: Hadii aad ku hadasho Soomaali, waaxda luqadaha, qaybta kaalmada adeegyada, ivan yates haycandyn azucena arguelles la'bharati . So [facility] [phone].    ATENCIÓN: Si habla español, tiene a muñoz disposición servicios gratuitos de asistencia lingüística. Llame al [phone].    We comply with applicable federal civil rights laws and [place] laws. We do not discriminate on the basis of race, color, national origin, age, disability, sex, sexual orientation, or gender identity.            Thank you!     " Thank you for choosing [facility] RHEUMATOLOGY  for your care. Our goal is always to provide you with excellent care. Hearing back from our patients is one way we can continue to improve our services. Please take a few minutes to complete the written survey that you may receive in the mail after your visit with us. Thank you!             Your Updated Medication List - Protect others around you: Learn how to safely use, store and throw away your medicines at www.disposemymeds.org.          This list is accurate as of: 10/20/17 10:09 AM.  Always use your most recent med list.                   Brand Name Dispense Instructions for use Diagnosis    amitriptyline 10 MG tablet    ELAVIL    60 tablet    Take 2 tablets (20 mg) by mouth At Bedtime    Itching, Post-pancreatectomy diabetes (H), History of pancreatectomy, Pancreatic insufficiency       amylase-lipase-protease 97097-97518 UNITS Cpep per EC capsule    CREON 24    450 capsule    Take 3-4 capsules by mouth with meals and 1-2 with snacks. Maximum 15 capsules per day.    Acquired total absence of pancreas       aspirin 81 MG EC tablet     90 tablet    Take 1 tablet (81 mg) by mouth daily    High platelet count (H)       BD SHARPS  Misc     1 each    1 Container as needed    Post-pancreatectomy diabetes (H)       blood glucose monitoring lancets     1 Box    Use to test blood sugar 6-8 times daily or as directed.    Acute on chronic pancreatitis (H)       blood glucose monitoring test strip    PAT CONTOUR NEXT    200 strip    Use to test blood sugar 6-8 times daily or as directed.    Acute on chronic pancreatitis (H), Post-pancreatectomy diabetes (H)       celecoxib 100 MG capsule    celeBREX    60 capsule    Take 1 capsule (100 mg) by mouth 2 times daily        cetirizine 10 MG tablet    zyrTEC     Take 10 mg by mouth daily    Elevated liver enzymes       CONTOUR NEXT EZ MONITOR W/DEVICE Kit      1 Device 6 times daily    Itching, Post-pancreatectomy  diabetes (H), History of pancreatectomy, Pancreatic insufficiency       diphenhydrAMINE 25 MG capsule    BENADRYL ALLERGY    56 capsule    Take 1 capsule (25 mg) by mouth every 6 hours as needed for itching or allergies    Itching, Post-pancreatectomy diabetes (H), History of pancreatectomy, Pancreatic insufficiency       docusate sodium 100 MG capsule    COLACE    60 capsule    Take 1 capsule (100 mg) by mouth 2 times daily as needed for constipation,    Itching, Post-pancreatectomy diabetes (H), History of pancreatectomy, Pancreatic insufficiency       glucagon 1 MG kit    GLUCAGON EMERGENCY    1 mg    Inject 1 mg into the muscle once for 1 dose    Post-pancreatectomy diabetes (H)       glucose 40 % Gel gel     3 Tube    Take 15-30 g by mouth every 15 minutes as needed for low blood sugar    Acquired total absence of pancreas       Injection Device for insulin Tika    NOVOPEN ECHO    1 each    1 each daily as needed    Post-pancreatectomy diabetes (H)       * insulin aspart 100 UNIT/ML injection    NovoLOG PEN     aspart medium correction 1 unit per 50 > 130 every 4 hours ? For Pre-Meal  - 179 give 1 unit.  For Pre-Meal  - 230 give 2 units.  For Pre-Meal  - 281 give 3 units.  For Pre-Meal  - 332 give 4 units.    Acquired total absence of pancreas       * insulin aspart 100 UNIT/ML injection    NovoLOG PENFILL    3 mL    Administer subcutaneously 0.5 unit per 45 grams of carbohydrates.    Post-pancreatectomy diabetes (H)       insulin glargine 100 UNIT/ML injection    LANTUS    3 mL    Inject 5 units subcutaneously every morning.    Post-pancreatectomy diabetes (H)       insulin pen needle 32G X 4 MM    BD KEN U/F    100 each    Use 6-8 times per day    Acquired total absence of pancreas       LANsoprazole 30 MG CR capsule    PREVACID    30 capsule    Take 1 capsule (30 mg) by mouth daily Take 30-60 minutes before a meal.    Post-pancreatectomy diabetes (H), Pancreatic insufficiency        levothyroxine 125 MCG tablet    SYNTHROID/LEVOTHROID    1 tablet    Take 1 tablet (125 mcg), by mouth daily before breakfast.    Itching, Post-pancreatectomy diabetes (H), History of pancreatectomy, Pancreatic insufficiency       Lubiprostone 8 MCG Caps capsule     180 capsule    Take 3 capsules (24mcg ) twice a day or as directed.    Chronic constipation       multivitamin CF formula capsule    CHOICEFUL     Take 1 tablet by mouth daily    Itching, Post-pancreatectomy diabetes (H), History of pancreatectomy, Pancreatic insufficiency       order for DME     1 Device    TENS Unit    Shoulder pain       oxyCODONE 5 MG IR tablet    ROXICODONE    150 tablet    Take 1-2 tablets (5-10 mg) by mouth every 4 hours as needed for moderate to severe pain    Acute post-operative pain       polyethylene glycol Packet    MIRALAX/GLYCOLAX    7 packet    Take 17 g by mouth daily as needed for constipation    Itching, Post-pancreatectomy diabetes (H), History of pancreatectomy, Pancreatic insufficiency       prochlorperazine 5 MG tablet    COMPAZINE    90 tablet    Take two 5 mg tablets by mouth every six hours as needed for nausea.    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 Read the directions carefully, and ask your doctor or  other care provider to review them with you.

## 2017-10-20 NOTE — MR AVS SNAPSHOT
After Visit Summary   10/20/2017    Dinora Mcghee    MRN: [id number]           Patient Information     Date Of Birth          1965        Visit Information        Provider Department      10/20/2017 11:00 AM Maria Elena Abdalla,  [facility]        Today's Diagnoses     Adjustment reaction to chronic stress    -  1       Follow-ups after your visit        Your next 10 appointments already scheduled     Nov 06, 2017  1:00 PM CST   (Arrive by 12:45 PM)   Return Visit with Maria Elena Abdalla PhD   [facility] ([facility])    [address] [place] [address]  [facility] [address]   [phone]            Nov 13, 2017  5:00 PM CST   (Arrive by 4:45 PM)   Return Visit with Maria Elena Abdalla,    [facility] ([facility])    [address] [place] [address]   [phone]            Nov 20, 2017  5:00 PM CST   (Arrive by 4:45 PM)   Return Visit with Maria Elena Abdalla,    [facility] ([facility])    [address] [place] [address]   [phone]            Mar 16, 2018  9:00 AM CDT   (Arrive by 8:45 AM)   Return Visit with Jesse Richardson DO   [facility] ([facility])    [address] [place] [id number] [phone]              Future tests that were ordered for you today     Open Standing Orders        Priority Remaining Interval Expires Ordered    Immunoglobulin G subclasses Routine 9/9 2 months 10/20/2018 10/20/2017            Who to contact     Please call your clinic at [phone] to:    Ask questions about your health    Make or cancel appointments    Discuss your medicines    Learn about your test results    Speak to your  doctor   If you have compliments or concerns about an experience at your clinic, or if you wish to file a complaint, please contact [facility] Patient Relations at [phone] or email us at [email or web address]         Additional Information About Your Visit        MyChart Information     [employer] gives you secure access to your electronic health record. If you see a primary care provider, you can also send messages to your care team and make appointments. If you have questions, please call your primary care clinic.  If you do not have a primary care provider, please call [phone] and they will assist you.      [employer] is an electronic gateway that provides easy, online access to your medical records. With [employer] you can request a clinic appointment, read your test results, renew a prescription or communicate with your care team.     To access your existing account, please contact your [facility] or call [phone] for assistance.        Care EveryWhere ID     This is your Care EveryWhere ID. This could be used by other organizations to access your [place] medical records  [id number]         Blood Pressure from Last 3 Encounters:   10/20/17 107/70   08/14/17 109/65   08/08/17 113/75    Weight from Last 3 Encounters:   10/20/17 68.4 kg (150 lb 14.4 oz)   08/14/17 67.1 kg (148 lb)   08/08/17 66.7 kg (147 lb)              Today, you had the following     No orders found for display         Where to get your medicines      These medications were sent to [address] [place] [address] [address] [address]    Hours:  TRANSPLANT PHONE NUMBER [phone] Phone:  [phone]     Lubiprostone 8 MCG Caps capsule          Primary Care Provider Office Phone # Fax #    Justyna Lawson [phone]       [facility] [place] [address]  [place]  [place] [address]        Equal Access to Services     [facility] : Hadii monty sotelo dinesh [place], waaxda luqadaha, qaybta kaalmada faybatshevaedin, waxdorian trudy cresporuletty guerra. So [facility] [phone].    ATENCIÓN: Si habla español, tiene a muñoz disposición servicios gratuitos de asistencia lingüística. Jomarame al [phone].    We comply with applicable federal civil rights laws and [place] laws. We do not discriminate on the basis of race, color, national origin, age, disability, sex, sexual orientation, or gender identity.            Thank you!     Thank you for choosing [facility]  for your care. Our goal is always to provide you with excellent care. Hearing back from our patients is one way we can continue to improve our services. Please take a few minutes to complete the written survey that you may receive in the mail after your visit with us. Thank you!             Your Updated Medication List - Protect others around you: Learn how to safely use, store and throw away your medicines at www.disposemymeds.org.          This list is accurate as of: 10/20/17  1:20 PM.  Always use your most recent med list.                   Brand Name Dispense Instructions for use Diagnosis    amitriptyline 10 MG tablet    ELAVIL    60 tablet    Take 2 tablets (20 mg) by mouth At Bedtime    Itching, Post-pancreatectomy diabetes (H), History of pancreatectomy, Pancreatic insufficiency       amylase-lipase-protease [address] UNITS Cpep per EC capsule    CREON 24    450 capsule    Take 3-4 capsules by mouth with meals and 1-2 with snacks. Maximum 15 capsules per day.    Acquired total absence of pancreas       aspirin 81 MG EC tablet     90 tablet    Take 1 tablet (81 mg) by mouth daily    High platelet count (H)       BD SHARPS  Misc     1 each    1 Container as needed    Post-pancreatectomy diabetes (H)       blood glucose monitoring lancets     1 Box    Use to test blood sugar 6-8  times daily or as directed.    Acute on chronic pancreatitis (H)       blood glucose monitoring test strip    PAT CONTOUR NEXT    200 strip    Use to test blood sugar 6-8 times daily or as directed.    Acute on chronic pancreatitis (H), Post-pancreatectomy diabetes (H)       celecoxib 100 MG capsule    celeBREX    60 capsule    Take 1 capsule (100 mg) by mouth 2 times daily        cetirizine 10 MG tablet    zyrTEC     Take 10 mg by mouth daily    Elevated liver enzymes       CONTOUR NEXT EZ MONITOR W/DEVICE Kit      1 Device 6 times daily    Itching, Post-pancreatectomy diabetes (H), History of pancreatectomy, Pancreatic insufficiency       diphenhydrAMINE 25 MG capsule    BENADRYL ALLERGY    56 capsule    Take 1 capsule (25 mg) by mouth every 6 hours as needed for itching or allergies    Itching, Post-pancreatectomy diabetes (H), History of pancreatectomy, Pancreatic insufficiency       docusate sodium 100 MG capsule    COLACE    60 capsule    Take 1 capsule (100 mg) by mouth 2 times daily as needed for constipation,    Itching, Post-pancreatectomy diabetes (H), History of pancreatectomy, Pancreatic insufficiency       glucagon 1 MG kit    GLUCAGON EMERGENCY    1 mg    Inject 1 mg into the muscle once for 1 dose    Post-pancreatectomy diabetes (H)       glucose 40 % Gel gel     3 Tube    Take 15-30 g by mouth every 15 minutes as needed for low blood sugar    Acquired total absence of pancreas       Injection Device for insulin Tika    NOVOPEN ECHO    1 each    1 each daily as needed    Post-pancreatectomy diabetes (H)       * insulin aspart 100 UNIT/ML injection    NovoLOG PEN     aspart medium correction 1 unit per 50 > 130 every 4 hours ? For Pre-Meal  - 179 give 1 unit.  For Pre-Meal  - 230 give 2 units.  For Pre-Meal  - 281 give 3 units.  For Pre-Meal  - 332 give 4 units.    Acquired total absence of pancreas       * insulin aspart 100 UNIT/ML injection    NovoLOG PENFILL    3 mL     Administer subcutaneously 0.5 unit per 45 grams of carbohydrates.    Post-pancreatectomy diabetes (H)       insulin glargine 100 UNIT/ML injection    LANTUS    3 mL    Inject 5 units subcutaneously every morning.    Post-pancreatectomy diabetes (H)       insulin pen needle 32G X 4 MM    BD KEN U/F    100 each    Use 6-8 times per day    Acquired total absence of pancreas       LANsoprazole 30 MG CR capsule    PREVACID    30 capsule    Take 1 capsule (30 mg) by mouth daily Take 30-60 minutes before a meal.    Post-pancreatectomy diabetes (H), Pancreatic insufficiency       levothyroxine 125 MCG tablet    SYNTHROID/LEVOTHROID    1 tablet    Take 1 tablet (125 mcg), by mouth daily before breakfast.    Itching, Post-pancreatectomy diabetes (H), History of pancreatectomy, Pancreatic insufficiency       Lubiprostone 8 MCG Caps capsule     180 capsule    Take 3 capsules (24mcg ) twice a day or as directed.    Chronic constipation       multivitamin CF formula capsule    CHOICEFUL     Take 1 tablet by mouth daily    Itching, Post-pancreatectomy diabetes (H), History of pancreatectomy, Pancreatic insufficiency       order for DME     1 Device    TENS Unit    Shoulder pain       oxyCODONE 5 MG IR tablet    ROXICODONE    150 tablet    Take 1-2 tablets (5-10 mg) by mouth every 4 hours as needed for moderate to severe pain    Acute post-operative pain       polyethylene glycol Packet    MIRALAX/GLYCOLAX    7 packet    Take 17 g by mouth daily as needed for constipation    Itching, Post-pancreatectomy diabetes (H), History of pancreatectomy, Pancreatic insufficiency       prochlorperazine 5 MG tablet    COMPAZINE    90 tablet    Take two 5 mg tablets by mouth every six hours as needed for nausea.    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 Read the directions carefully, and ask your doctor or other care provider to review them with you.

## 2017-10-24 ENCOUNTER — TRANSFERRED RECORDS (OUTPATIENT)
Dept: HEALTH INFORMATION MANAGEMENT | Facility: CLINIC | Age: 52
End: 2017-10-24

## 2017-10-25 NOTE — TELEPHONE ENCOUNTER
"APPT INFO    Date /Time: 11/1/17 11:40AM    Reason for Appt: Nausea, mobility ,issues post pancreatectomy..Per DR Park   Ref Provider/Clinic: [facility]   Patient Contact (Y/N) & Call Details: No, pt was referred.    Action: None      RECORDS CLINIC NAME  (\"None\" if no records ) RECEIVED RECS & IMG? Y/N   (may include other helpful notes)   Internal Clinics: [facility] (referring)  Recs & referral in Epic / Images in PACS     [facility] Alban BURLESON Recs are in Epic / Images in PACS    [facility] Pain Management Maren BURLESON & Reese Vaca MD  Recs are in [facility]  ED Note 8/2/16     [facility] GI George Flores MD Recs are in [facility] Renny BURLESON & Milo BURLESON  Recs are in [facility] 9/26/12   External Clinics: None        "

## 2017-10-29 ASSESSMENT — ENCOUNTER SYMPTOMS
BLOATING: 1
FLANK PAIN: 1
BLOOD IN STOOL: 0
POLYDIPSIA: 0
TROUBLE SWALLOWING: 1
HALLUCINATIONS: 0
EYE WATERING: 0
CHILLS: 0
FEVER: 0
BOWEL INCONTINENCE: 0
CONSTIPATION: 1
SPEECH CHANGE: 0
HEADACHES: 1
NERVOUS/ANXIOUS: 1
WEIGHT GAIN: 0
DIARRHEA: 1
LOSS OF CONSCIOUSNESS: 0
DYSURIA: 1
PANIC: 1
TASTE DISTURBANCE: 0
NIGHT SWEATS: 1
EYE REDNESS: 0
WEAKNESS: 0
SMELL DISTURBANCE: 0
RECTAL PAIN: 0
DIZZINESS: 1
MEMORY LOSS: 1
HEMATURIA: 0
SINUS CONGESTION: 1
VOMITING: 1
SINUS PAIN: 0
INSOMNIA: 1
POLYPHAGIA: 0
EYE IRRITATION: 1
JAUNDICE: 0
RECTAL BLEEDING: 0
FATIGUE: 1
DISTURBANCES IN COORDINATION: 0
TINGLING: 0
NAUSEA: 1
HOARSE VOICE: 1
TREMORS: 0
PARALYSIS: 0
INCREASED ENERGY: 1
DIFFICULTY URINATING: 0
HEARTBURN: 1
ABDOMINAL PAIN: 1
NUMBNESS: 0
ALTERED TEMPERATURE REGULATION: 1
DOUBLE VISION: 0
DEPRESSION: 0
DECREASED APPETITE: 0
EYE PAIN: 1
WEIGHT LOSS: 0
SORE THROAT: 1
DECREASED CONCENTRATION: 0
SEIZURES: 0
NECK MASS: 0

## 2017-10-29 ASSESSMENT — ACTIVITIES OF DAILY LIVING (ADL): I_KEEP_THINKING_ABOUT_HOW_BADLY_I_WANT_THE_PAIN_TO_STOP: 1 = TO A SLIGHT DEGREE

## 2017-11-01 ENCOUNTER — OFFICE VISIT (OUTPATIENT)
Dept: GASTROENTEROLOGY | Facility: CLINIC | Age: 52
End: 2017-11-01

## 2017-11-01 VITALS
WEIGHT: 149.4 LBS | HEIGHT: 68 IN | DIASTOLIC BLOOD PRESSURE: 63 MMHG | OXYGEN SATURATION: 100 % | SYSTOLIC BLOOD PRESSURE: 114 MMHG | HEART RATE: 123 BPM | BODY MASS INDEX: 22.64 KG/M2

## 2017-11-01 DIAGNOSIS — K59.00 CONSTIPATION, UNSPECIFIED CONSTIPATION TYPE: ICD-10-CM

## 2017-11-01 DIAGNOSIS — G43.909 MIGRAINE WITHOUT STATUS MIGRAINOSUS, NOT INTRACTABLE, UNSPECIFIED MIGRAINE TYPE: ICD-10-CM

## 2017-11-01 DIAGNOSIS — R11.2 NAUSEA AND VOMITING, INTRACTABILITY OF VOMITING NOT SPECIFIED, UNSPECIFIED VOMITING TYPE: Primary | ICD-10-CM

## 2017-11-01 DIAGNOSIS — R10.11 RUQ ABDOMINAL PAIN: ICD-10-CM

## 2017-11-01 ASSESSMENT — PAIN SCALES - GENERAL: PAINLEVEL: MILD PAIN (3)

## 2017-11-01 NOTE — NURSING NOTE
"Chief Complaint   Patient presents with     Clinic Care Coordination - Initial     abdominal pain       Vitals:    11/01/17 1138   BP: 114/63   BP Location: Left arm   Patient Position: Chair   Cuff Size: Adult Regular   Pulse: 123   SpO2: 100%   Weight: 67.8 kg (149 lb 6.4 oz)   Height: 1.727 m (5' 8\")       Body mass index is 22.72 kg/(m^2).    Missy Vee RN                          "

## 2017-11-01 NOTE — PROGRESS NOTES
GI CLINIC VISIT    CC/REFERRING MD:  Dr. Park  REASON FOR CONSULTATION:   Ms. Loo is a 52 year old female who I was asked to see in consultation at the request of Dr. Park for   Chief Complaint   Patient presents with     Clinic Care Coordination - Initial     abdominal pain       ASSESSMENT/PLAN:  (R11.2) Nausea and vomiting, intractability of vomiting not specified, unspecified vomiting type  (primary encounter diagnosis)  (G43.909) Migraine without status migrainosus, not intractable, unspecified migraine type  Comment:     Currently n/v which began with severe migraine and reported increase in migraine frequency despite prophylactic medications. Failure of abortive medications as well necessitating re-evaluation of current interventions. Will refer to neurology.    Unclear if any GI contributors to nausea, will need to optimize migraine management and continue to follow. Unclear if true diagnosis of gastroparesis with study done potentially on pain meds. Patient also tolerating a full regular diet without issue through the spring and summer until the past 6 weeks.    Nausea certainly can be worsened by constipation and a number of her medications. Will attempt to stabilize stooling pattern as outlined below.    Noted pt concern that pain episodes are a return to her pre-TPIAT baseline and anxiety that this represents something more sinister. Reviewed recent imaging and discussion from her care team members regarding hepatic dome lesion. Difficult for her to characterize her pain given infrequency (3-4 episodes over the last 6 months) and we discussed symptom tracking as well as concerning signs to prompt repeat ED visit.   Plan:   - Referral to neurology for further migraine management.  - Symptom tracking for severe pain episodes - document symptoms, what you were doing at the time, diet in prior 24 hours, what bowels were doing, etc    (K59.00) Constipation, unspecified constipation type  (R10.11)  "RUQ abdominal pain  Comment:     As above, unclear if any particular triggers for her pain and if it tracks with periods of constipation. She is stooling well with Amitiza though her irregular pattern causes some distress. Unclear if constipation driven by backing off of meds on loose stool days versus emptying of bowels on loose stool days and not needing to have a BM the following day. Pt feels she has some \"bloating\" and, perhaps, nausea on day 1 of no BM, which prompt her to then aggressively treat with additional laxatives when its been only 24 hours between BMs.     Will try to maintain a steady dose of Amitiza with titration of her other medications (particularly senna which can lead to cramping, pain, and more pronounced nausea). Will stop Miralax which seems to worsen bloating at treat with prn bisacodyl. Bowel pattern complicated by need for pancreatic enzymes.   Plan:   - On loose days, hold your senna and colace. Continue your base dose of Amitiza (3 tabs in the morning and 3 at night).   - Stop Miralax altogether.  - On constipated days, use 2 bisacodyl as opposed to Miralax     **    Check with your father about what types of colon polyps he had and what ages they were found as it may affect when you need a colonoscopy.     RTC 4 months    Thank you for this consultation.  It was a pleasure to participate in the care of this patient; please contact us with any further questions.  A total of 60 face-to-face minutes was spent with this patient, >50% of which was counseling regarding the above delineated issues.    Vijaya Genao MD   of Medicine  Division of Gastroenterology, Hepatology and Nutrition  [facility]    ---------------------------------------------------------------------------------------------------  HPI:   Ms. Loo is a 52 year old female with chronic pancreatitis disease s/p TPIAT (12/2016), with prior elevated LFTs and hepatic dome lesion with " "focally increased IgG 4 followed in pancreas txplt clinic, rheumatology, and consulted with hepatology (with no current treatment and improvement on recent imaging) reported gastroparesis, migraine HAs, hypothyroidism, and history of pituitary adenoma s/p resection who presents to luminal GI clinic for management of her other GI concerns as outlined below.      1. Abdominal pain    Ms. Loo had issues with severe daily abdominal pain prior to her TPIAT. Overall she felt she was doing well after surgery and was able to be weaned off of narcotics though she states she still follows in the pain clinic. Three or four times since April 2017 (about once every other month), she has experienced pronounced abdominal pain similar in nature to her prior chronic pancreatic pain. The pain is localized in the epigastric region and just under her right 12th rib; the pain does not radiate beyond that point and is described as \"sharp.\" It did wake her from sleep on one occasion prompting an ED visit on 5/20/17. At that time pain was reported in the LUQ and she was seen to on CT have left abdominal wall fat stranding but with no associated cellulitis or intra-abdominal pathology. An MR abd done a month prior demonstrated a subQ fluid collection in this area. She was discharged to home from the ED. An MRI done thereafter in July 2017 did not show persistance of this finding and heterogenous area of enhancement in the dome of the liver (initially seen on 5/20/17 CT) was improved.    She expresses much concern that this is a recurrence of her prior pancreas pain, though she feels it shouldn't happen since \"my pancreas is gone.\" In our discussion today, she shares that she has been working with her psychologist in pain clinic on mind-body interventions; they have apparently discussed that this could be a reactivation of prior pain pathways. Ms. Loo finds this interventions helpful sometimes but not always. She cannot provide much " "more in the way of details of the pain during episodes, what she'd been doing, eating, or how it related to her stool pattern.       Ms. Loo also notes a much less pronounced pain which comes on in the same area, but only with intense exercise. This seems to get better with stopping or walking and has not occurred at other times.    2. Gastroparesis  3. Nausea, vomiting    GES pursued in the setting of chronic nausea and vomiting during her chronic pancreatitis and TPIAT evaluation. She states she had two-hour study at [facility] that may have been consistent with gastroparesis. A follow-up four-hour study was done here on 6/27/16 with 49% seen remaining at 4 hours. Ms. Loo is not sure if she was on narcotics at that time. Per her recollection she didn't start daily pain medications until Fall 2016, but she did use them intermittently prior to that time. A G-J tube was placed in fall 2016 with G-tube used for venting and jejunal extension for tube feeds. After her TPIAT she was able to wean off her TFs and felt she no longer required venting. By spring 2017 she was tolerating a regular diet with no nausea or vomiting and in March 2017 her G-J tube was able to be removed.  Throughout the spring and summer she was on a regular diet and had no bloating, nausea, or vomiting.     Unfortunately about 6 weeks ago she had a \"severe\" migraine HA with associated vomiting. She describes 12 hours of emesis and retching with 12+ hours of HA pain similar to her prior migraines. Since then has had frequent HAs with both prolonged migraines (lasting 3 days per report) and frequent migraines (up to 3-5 days out of a week). Since that time she feels she has nausea every day, often waking with it. While nauseated she isn't hungry, but by evening feels well and is able to eat an evening meal.  Ms. Loo reports 5 episodes of nonbloody, nonbilious emesis in the last 6 weeks, each of these episodes has occurred during a " migraine HA.     She is on chronic amitriptyline for migraine prophylaxis and wonders if her dose should be changed. She has had previously used sumatriptan for migraines, but this last efficacy and some time ago she was switched to zolmitriptan. Unfortunately she does not feel the zolmitriptan is working any longer. She has seen neurology in the past (she thinks several years). For her nausea and vomiting she uses scopolamine patch, procholorperazine, or promethazine all with equal effect. She likes the scopolamine but limits it use due to side effects.        4. Constipation   Ms. Loo reports that she's had constipation ongoing for years which began after she had her first child (about 24 years ago). She denies any issues with her bowels as an infant or child, and reports her baseline pattern was moving her bowels every day to every other day up until her pregnancy.     She had a bowel cleanout around that time and has been on Miralax intermittently since then. She began more regular senna use about 18 months ago and then colace was started a couple months before her TPIAT (this was in the setting of increased pain medication dosing). She has intermittently done bowel cleanouts through her primary care clinic or as recommended by Dr. Phoenix; these cleanouts were generally with Miralax/Gatorade prep and she reported good results.  Post-TPIAT she did require intermittent enemas and suppositories while still on opiates but has not continued these medications.     Currently she is on senna twice daily and colace twice daily since her TPIAT. She had been on Milk of Mg BID and Miralax as needed, but was still experiencing constipation. A few months after her surgery she was started on Linzess. She reported severed diarrhea after 1-2 doses and thus discontinued the medication. She was then placed, ultimately titrating to 24 mcg in morning and night. Intially she was having BMs daily, but has since had longer periods of  "time without BMs.     Her current stooling pattern is as follows:  On loose days will have a cluster of 1-2 stools before breakfast and another 1-3 BMs until noon, McClure 6-7. On these days she has a lot of urgency which is quite bothersome.  On good days will have just two BMs in the morning, McClure 4-5.   On constipated days will have no BMs.      Her pattern is typically several \"good\" days in a row (4-7 good days), then 1-2 loose days (during which she may take 16 mcg Amitiza in the morning as opposed to 24 mcg), and then several constipated days. The first day she has no BM in the morning she will take Miralax in the afternoon, this never works, then she takes a second one at night. She will typically need to take 3-5 doses of Miralax over a couple days and a bisacodyl before she stools. She experiences bloating and increased nausea with Miralax use. She doesn't feel its worked on its own and feels bisacodyl is always needed. When she ultimately moves her bowels, she will have a very large amount of soft stool, with return to her baseline pattern (good day --> loose day --> constipation). Constipated days are most bothersome (compared with loose stools) as she feels pain in RUQ, back, bloating, and nausea (though she's not sure how much nausea is related to her migraines as detailed above).      She denies any issues with nocturnal stools or overt GI bleeding. She notes that she works hard to stay hydrated. She is on Creon, typically 15 tabs per day. On more loose or constipated days she has not done any titrating of her Creon. She denies noting fatty stools/grease on loose stool days.      Her last colonoscopy was in 2014 with minimal diverticulosis and no polyps. She denies any prior hx of polyps and was told she needed a repeat colonoscopy in 2024. There is no known family history of colon CA though she states her father had an unknown type of polyp in the setting of colitis, found, possibly, when he was " in his 40s. She has never undergone any prior anorectal testing.    GERD  Dysphagia    Her GERD is experienced as substernal and throat burning as well as brining up off nocturnal refluxant (would awaken choking) and refluxant with bending over. Her initial evaluation was in, then eventually with Dr. George Flores here. She reports a prior Schatzki's ring which required dilation in the past. Esophageal manometry done here was normal; pH impedence revealed a DeMeester of 24 with positive symptom association.  Recommendations at her most recent esophageal clinic visit in Jan 2016 was for BID PPI with consideration of BID H2 blocker if still symptomatic. A trial of carafate did not improve her GERD. Ms. Loo states that pursuit of Linx procedure was discussed with Dr. George Flores. She has done well with daily lansoprazole and lifestyle/behavioural changes and thus has not pursued it yet.       ROS:    Please see bottom of this note.     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  Document previously scanned on 1/12/17.  Validation form completed and scanned.  Code Status reflects choices in most recent ACP document..  Confirmed/documented designated decision maker(s).  Added by May Baires   Advance Care Planning Liaison               Malfunctioning jejunostomy tube (H) 01/22/2017     Priority: Medium     Acquired total absence of pancreas 01/17/2017     Priority: Medium     Acute post-operative pain 01/17/2017     Priority: Medium     Chronic pancreatitis (H) 01/17/2017     Priority: Medium     Dehydration 01/12/2017     Priority: Medium     Post-pancreatectomy diabetes (H)  12/28/2016     Priority: Medium     Acute on chronic pancreatitis (H) 09/03/2016     Priority: Medium     Acute abdominal pain 08/02/2016     Priority: Medium     Abdominal pain, epigastric 10/23/2015     Priority: Medium     Severe protein-calorie malnutrition (H) 06/09/2015     Priority: Medium     Acute pancreatitis 06/03/2015     Priority: Medium     Abdominal pain 06/02/2015     Priority: Medium     S/P ERCP 06/02/2015     Priority: Medium     Abdominal pain, generalized 04/20/2015     Priority: Medium     History of ERCP 04/20/2015     Priority: Medium     Other type of intractable migraine      Priority: Medium     Diagnosis updated by automated process. Provider to review and confirm.       GERD (gastroesophageal reflux disease) 12/01/2010     Priority: Medium     Lumbago 04/18/2005     Priority: Medium     History of L5-S1 degenerative disk disease.         Sprain and strain of other specified sites of hip and thigh 12/09/2002     Priority: Medium     Chondromalacia of patella 12/09/2002     Priority: Medium     Need for prophylactic immunotherapy      Priority: Medium     trees, grass, srw, dust mites, cat       Allergic rhinitis due to other allergen 12/21/2001     Priority: Medium     Hypothyroidism      Priority: Medium     Problem list name updated by automated process. Provider to review       Sensorineural hearing loss 01/10/2005     Priority: Medium     Problem list name updated by automated process. Provider to review       Vertiginous syndrome and labyrinthine disorder 01/10/2005     Priority: Medium     Problem list name updated by automated process. Provider to review         PERTINENT PAST MEDICAL HISTORY:  Past Medical History:   Diagnosis Date     Allergic rhinitis, cause unspecified      Allergy to other foods     strawberries, apples, celeries, alice, watermelon     Arthritis     left knee     Choledocholithiasis     long after cholecystectomy     Chronic abdominal pain      Chronic  constipation      Chronic nausea      Chronic pancreatitis (H)      Degeneration of lumbar or lumbosacral intervertebral disc      Esophageal reflux     w/ hiatal hernia     Gastroparesis      Hiatal hernia      History of pituitary adenoma     s/p resection     Hypothyroidism      Migraines      Mild hyperlipidemia      On tube feeding diet     presence of GJ tube     Pancreatic disease     PD stricture, 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 endometriosis growth     APPENDECTOMY       C  DELIVERY ONLY       C  DELIVERY ONLY      repeat c section with incidental cystotomy with repair     C EXCIS PITUITARY,TRANSNASAL/SEPTAL      pituitary tumor removed for diabetes insipidus     C TOTAL ABDOM HYSTERECTOMY      w/ bilateral salpingoophorectomy      C WRIST ARTHROSCOP,RELEASE XVERS LIG Bilateral 08      SECTION       COLONOSCOPY       ENDOSCOPIC RETROGRADE CHOLANGIOPANCREATOGRAM N/A 2015    Procedure: ENDOSCOPIC RETROGRADE CHOLANGIOPANCREATOGRAM;  Surgeon: Mandeep Park MD;  Location: [facility]     ENDOSCOPIC RETROGRADE CHOLANGIOPANCREATOGRAM N/A 2016    Procedure: COMBINED ENDOSCOPIC RETROGRADE CHOLANGIOPANCREATOGRAPHY, PLACE TUBE/STENT;  Surgeon: Mandeep Park MD;  Location: [facility]     ENDOSCOPIC RETROGRADE CHOLANGIOPANCREATOGRAM N/A 3/17/2016    Procedure: COMBINED ENDOSCOPIC RETROGRADE CHOLANGIOPANCREATOGRAPHY, REMOVE FOREIGN BODY OR STENT/TUBE;  Surgeon: Mandeep Park MD;  Location: [facility]     ENDOSCOPIC RETROGRADE CHOLANGIOPANCREATOGRAM N/A 2016    Procedure: COMBINED ENDOSCOPIC RETROGRADE CHOLANGIOPANCREATOGRAPHY, PLACE TUBE/STENT;  Surgeon: Mandeep Park  MD Eduardo;  Location: [facility]     ENDOSCOPIC RETROGRADE CHOLANGIOPANCREATOGRAM N/A 8/26/2016    Procedure: COMBINED ENDOSCOPIC RETROGRADE CHOLANGIOPANCREATOGRAPHY, REMOVE FOREIGN BODY OR STENT/TUBE;  Surgeon: Mandeep Park MD;  Location: [facility]     ENDOSCOPIC ULTRASOUND UPPER GASTROINTESTINAL TRACT (GI) N/A 10/3/2016    Procedure: ENDOSCOPIC ULTRASOUND, ESOPHAGOSCOPY / UPPER GASTROINTESTINAL TRACT (GI);  Surgeon: Guru Jose Rodas MD;  Location: [facility]     ESOPHAGOSCOPY, GASTROSCOPY, DUODENOSCOPY (EGD), COMBINED N/A 6/24/2015    Procedure: COMBINED ESOPHAGOSCOPY, GASTROSCOPY, DUODENOSCOPY (EGD), REMOVE FOREIGN BODY;  Surgeon: Mandeep Park MD;  Location: [facility]     ESOPHAGOSCOPY, GASTROSCOPY, DUODENOSCOPY (EGD), COMBINED N/A 10/25/2015    Procedure: COMBINED ESOPHAGOSCOPY, GASTROSCOPY, DUODENOSCOPY (EGD);  Surgeon: Sammy Amaro MD;  [facility]: [facility]     ESOPHAGOSCOPY, GASTROSCOPY, DUODENOSCOPY (EGD), COMBINED N/A 10/25/2015    Procedure: COMBINED ESOPHAGOSCOPY, GASTROSCOPY, DUODENOSCOPY (EGD), BIOPSY SINGLE OR MULTIPLE;  Surgeon: Sammy Amaro MD;  Location: [facility]     ESOPHAGOSCOPY, GASTROSCOPY, DUODENOSCOPY (EGD), DILATATION, COMBINED       EXCISE LESION TRUNK N/A 4/17/2017    Procedure: EXCISE LESION TRUNK;  Removal of Abdominal Foreign Body;  Surgeon: Nestor Phoenix MD;  [facility]     HC ESOPH/GAS REFLUX TEST W NASAL IMPED >1 HR N/A 11/19/2015    Procedure: ESOPHAGEAL IMPEDENCE FUNCTION TEST WITH 24 HOUR PH GREATER THAN 1 HOUR;  Surgeon: Thiago Apple MD;  Location: [facility]     HC UGI ENDOSCOPY DIAG W BIOPSY  9/17/08     HC UGI ENDOSCOPY DIAG W BIOPSY  9/27/12     HC UGI ENDOSCOPY W ESOPHAGEAL DILATION BALLOON <30MM  9/17/08     HC UGI ENDOSCOPY W EUS N/A 5/5/2015    Procedure: COMBINED ENDOSCOPIC ULTRASOUND, ESOPHAGOSCOPY, GASTROSCOPY, DUODENOSCOPY (EGD);  Surgeon: Wm Dueñas MD;  Location: [facility]     INJECT TRANSVERSUS ABDOMINIS PLANE (TAP)  BLOCK BILATERAL Left 9/22/2016    Procedure: INJECT TRANSVERSUS ABDOMINIS PLANE (TAP) BLOCK BILATERAL;  Surgeon: Dickson Corrigan MD;  [facility]     laparoscopic pineda  1995     LAPAROSCOPIC PANCREATECTOMY, TRANSPLANT AUTO ISLET CELL N/A 12/28/2016    Procedure: LAPAROSCOPIC PANCREATECTOMY, TRANSPLANT AUTO ISLET CELL;  Surgeon: Nestor Phoenix MD;  Location: [facility]     transphenoidal pituitary resection  1990       ALLERGIES:  Allergies   Allergen Reactions     Apple Anaphylaxis     Depakote [Divalproex Sodium] Other (See Comments)     Chest pain     Zithromax [Azithromycin Dihydrate] Anaphylaxis     Noted in 4/7/08 ER     Darvocet [Propoxyphene N-Apap] Itching     Morphine Nausea and Vomiting and Rash     Nalbuphine Hcl Rash     RASH :nubaine     Zosyn [Piperacillin-Tazobactam In D5w] Rash     Possible allergy, did have a diffuse rash that seemed drug related but could have also been related to soap in the hospital.      Bactrim [Sulfamethoxazole W-Trimethoprim] Other (See Comments) and Nausea and Vomiting     Severely low liver function.     Cats      Compazine [Prochlorperazine] Other (See Comments)     Twitching. Takes Benedryl and is fine     Corticosteroids Other (See Comments)     All oral,IV and injectable steroids are contraindicated (unless in life threatening situations) in [facility] transplant recipients. They can cause irreversible loss of islet cell function. Please contact patients transplant care coordinator Mecca ANGEL @ [phone]/Pager [phone], and Endocrinologist prior to administration.     Dust Mites      Grass      Prednisone Other (See Comments)     Insomnia       Ragweeds      Tape [Adhesive Tape] Blisters     Trees      Zofran [Ondansetron] Other (See Comments)     migraine       PERTINENT MEDICATIONS:  Current Outpatient Prescriptions   Medication     scopolamine (TRANSDERM-SCOP, 1.5 MG,) 72 hr patch     Lubiprostone 8 MCG CAPS capsule     ursodiol (ACTIGALL) 300 MG  capsule     order for DME     celecoxib (CELEBREX) 100 MG capsule     blood glucose monitoring (PAT CONTOUR NEXT) test strip     amylase-lipase-protease (CREON 24) 03953 UNITS CPEP per EC capsule     insulin glargine (LANTUS) 100 UNIT/ML injection     insulin pen needle (BD KEN U/F) 32G X 4 MM     cetirizine (ZYRTEC) 10 MG tablet     insulin aspart (NOVOLOG PENFILL) 100 UNIT/ML injection     oxyCODONE (ROXICODONE) 5 MG IR tablet     insulin aspart (NOVOLOG PEN) 100 UNIT/ML injection     amitriptyline (ELAVIL) 10 MG tablet     senna-docusate (SENOKOT-S;PERICOLACE) 8.6-50 MG per tablet     prochlorperazine (COMPAZINE) 5 MG tablet     polyethylene glycol (MIRALAX/GLYCOLAX) Packet     diphenhydrAMINE (BENADRYL ALLERGY) 25 MG capsule     levothyroxine (SYNTHROID/LEVOTHROID) 125 MCG tablet     docusate sodium (COLACE) 100 MG capsule     multivitamin CF formula (CHOICEFUL) capsule     Blood Glucose Monitoring Suppl (CONTOUR NEXT EZ MONITOR) W/DEVICE KIT     LANsoprazole (PREVACID) 30 MG CR capsule     aspirin 81 MG EC tablet     Injection Device for insulin (NOVOPEN ECHO) MARCO     glucose 40 % GEL gel     Sharps Container (BD SHARPS ) MISC     blood glucose monitoring (PAT MICROLET) lancets     glucagon (GLUCAGON EMERGENCY) 1 MG kit     No current facility-administered medications for this visit.        SOCIAL HISTORY:  Social History     Social History     Marital status:      Spouse name: Norris     Number of children: 3     Years of education: 16     Occupational History     director [employer]     Social History Main Topics     Smoking status: Former Smoker     Packs/day: 0.50     Years: 6.00     Types: Cigarettes     Start date: 9/1/1985     Quit date: 1/1/1992     Smokeless tobacco: Never Used      Comment: no 2nd hand     Alcohol use No      Comment: last drink 6/2016  - moderate social     Drug use: No     Sexual activity: Yes     Partners: Male     Birth control/ protection: None      Comment:  "Hystectomy 1999     Other Topics Concern     Parent/Sibling W/ Cabg, Mi Or Angioplasty Before 65f 55m? No     Blood Transfusions No     Seat Belt Yes     Social History Narrative     with 3 children and a dog.  No smoking, etoh or drug use.  Worked as a  for [employer] in [place] in the past.  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       FAMILY HISTORY:  Family History   Problem Relation Age of Onset     Eye Disorder Father      cataract, detached retina     Myocardial Infarction Father 60     Lipids Father      CEREBROVASCULAR DISEASE Father      Depression Father      Substance Abuse Father      Anesthesia Reaction Father      stroke right after surgery     Lipids Mother      Hypertension Mother      Thyroid Disease Mother      Eye Disorder Son      ptosis     Eye Disorder Paternal Grandmother      cataract     Eye Disorder Paternal Grandfather      cataract     DIABETES Paternal Grandfather      Eye Disorder Maternal Grandmother      cataract     Thyroid Disease Maternal Grandmother      Coronary Artery Disease Sister      Depression Sister      Depression Son      Anxiety Disorder Son      Thyroid Disease Sister      DIABETES Maternal Grandfather      Depression Nephew      Anxiety Disorder Nephew      Thyroid Disease Nephew      Type 2 Diabetes Cousin      paternal cousin     Past/family/social history reviewed and no changes    PHYSICAL EXAMINATION:  Vitals /63 (BP Location: Left arm, Patient Position: Chair, Cuff Size: Adult Regular)  Pulse 123  Ht 1.727 m (5' 8\")  Wt 67.8 kg (149 lb 6.4 oz)  SpO2 100%  BMI 22.72 kg/m2   Wt   Wt Readings from Last 2 Encounters:   11/01/17 67.8 kg (149 lb 6.4 oz)   10/20/17 68.4 kg (150 lb 14.4 oz)   Gen: Pt sitting in NAD, interactive and cooperative on exam  Eyes: sclerae anicteric, no injection  ENT:  OP clear, MMM  Cardiac: RRR, nl S1, S2, no peripheral edema  Resp: Clear on anterior exam  GI: " Normoactive BS, abd soft, flat, nontender  Skin: Warm, dry, no jaundice, nails appear healthy  Lymph: no submandibular, no cervical, and no supraclavicular LAD  Neuro: alert, oriented, answers questions appropriately      PERTINENT STUDIES:    Orders Only on 10/20/2017   Component Date Value Ref Range Status     Bilirubin Direct 10/20/2017 0.1  0.0 - 0.2 mg/dL Final     Bilirubin Total 10/20/2017 0.7  0.2 - 1.3 mg/dL Final     Albumin 10/20/2017 3.7  3.4 - 5.0 g/dL Final     Protein Total 10/20/2017 7.4  6.8 - 8.8 g/dL Final     Alkaline Phosphatase 10/20/2017 161* 40 - 150 U/L Final     ALT 10/20/2017 22  0 - 50 U/L Final     AST 10/20/2017 23  0 - 45 U/L Final     INR 10/20/2017 1.04  0.86 - 1.14 Final     IGG 10/20/2017 1070  695 - 1620 mg/dL Final     IgG1 10/20/2017 429  300 - 856 mg/dL Final     IgG2 10/20/2017 370  158 - 761 mg/dL Final     IgG3 10/20/2017 83  24 - 192 mg/dL Final     IgG4 10/20/2017 89* 11 - 86 mg/dL Final         Answers for HPI/ROS submitted by the patient on 10/29/2017   General Symptoms: Yes  Skin Symptoms: No  HENT Symptoms: Yes  EYE SYMPTOMS: Yes  HEART SYMPTOMS: No  LUNG SYMPTOMS: No  INTESTINAL SYMPTOMS: Yes  URINARY SYMPTOMS: Yes  GYNECOLOGIC SYMPTOMS: No  BREAST SYMPTOMS: No  SKELETAL SYMPTOMS: No  BLOOD SYMPTOMS: No  NERVOUS SYSTEM SYMPTOMS: Yes  MENTAL HEALTH SYMPTOMS: Yes  Fever: No  Loss of appetite: No  Weight loss: No  Weight gain: No  Fatigue: Yes  Night sweats: Yes  Chills: No  Increased stress: Yes  Excessive hunger: No  Excessive thirst: No  Feeling hot or cold when others believe the temperature is normal: Yes  Loss of height: No  Post-operative complications: No  Surgical site pain: No  Hallucinations: No  Change in or Loss of Energy: Yes  Hyperactivity: No  Confusion: No  Ear pain: Yes  Ear discharge: Yes  Hearing loss: No  Tinnitus: No  Nosebleeds: No  Congestion: Yes  Sinus pain: No  Trouble swallowing: Yes   Voice hoarseness: Yes  Mouth sores: No  Sore throat:  Yes  Tooth pain: No  Gum tenderness: No  Bleeding gums: No  Change in taste: No  Change in sense of smell: No  Dry mouth: Yes  Hearing aid used: No  Neck lump: No  Eye pain: Yes  Vision loss: No  Dry eyes: Yes  Watery eyes: No  Eye bulging: No  Double vision: No  Flashing of lights: No  Spots: No  Floaters: No  Redness: No  Crossed eyes: No  Tunnel Vision: No  Yellowing of eyes: No  Eye irritation: Yes  Heart burn or indigestion: Yes  Nausea: Yes  Vomiting: Yes  Abdominal pain: Yes  Bloating: Yes  Constipation: Yes  Diarrhea: Yes  Blood in stool: No  Black stools: No  Rectal or Anal pain: No  Fecal incontinence: No  Rectal bleeding: No  Yellowing of skin or eyes: No  Vomit with blood: No  Change in stools: No  Hemorrhoids: No  Trouble holding urine or incontinence: No  Pain or burning: Yes  Trouble starting or stopping: Yes  Increased frequency of urination: No  Blood in urine: No  Decreased frequency of urination: No  Frequent nighttime urination: Yes  Flank pain: Yes  Difficulty emptying bladder: No  Trouble with coordination: No  Dizziness or trouble with balance: Yes  Fainting or black-out spells: No  Memory loss: Yes  Headache: Yes  Seizures: No  Speech problems: No  Tingling: No  Tremor: No  Weakness: No  Difficulty walking: No  Paralysis: No  Numbness: No  Nervous or Anxious: Yes  Depression: No  Trouble sleeping: Yes  Trouble thinking or concentrating: No  Mood changes: Yes  Panic attacks: Yes

## 2017-11-01 NOTE — PATIENT INSTRUCTIONS
1. Check with your father about what types of colon polyps he had and what ages they were found as it may affect when you need a colonoscopy.   2. On loose days, hold your senna and colace. Continue your base dose of Amitiza (3 tabs in the morning and 3 at night).   3. Stop Miralax altogether.  4. On constipated days, use 2 bisacodyl.   5. Referral to neurology for further migraine management.  6. Symptom tracking for severe pain episodes - document symptoms, what you were doing at the time, diet in prior 24 hours, what bowels were doing, etc  7  If you have any questions, please don't hesitate to contact our GI RN Clinic Coordinators at [phone] (select option #3 for nurse line).   7. Follow-up in GI clinic in ~ 4 months

## 2017-11-01 NOTE — LETTER
11/1/2017       RE: Dinora Mcghee  [address] [facility] [address]     Dear Colleague,    Thank you for referring your patient, Dinora Mcghee, to the [facility] at [facility]. Please see a copy of my visit note below.    GI CLINIC VISIT    CC/REFERRING MD:  Dr. Park  REASON FOR CONSULTATION:   Ms. Loo is a 52 year old female who I was asked to see in consultation at the request of Dr. Park for   Chief Complaint   Patient presents with     Clinic Care Coordination - Initial     abdominal pain       ASSESSMENT/PLAN:  (R11.2) Nausea and vomiting, intractability of vomiting not specified, unspecified vomiting type  (primary encounter diagnosis)  (G43.709) Migraine without status migrainosus, not intractable, unspecified migraine type  Comment:     Currently n/v which began with severe migraine and reported increase in migraine frequency despite prophylactic medications. Failure of abortive medications as well necessitating re-evaluation of current interventions. Will refer to neurology.    Unclear if any GI contributors to nausea, will need to optimize migraine management and continue to follow. Unclear if true diagnosis of gastroparesis with study done potentially on pain meds. Patient also tolerating a full regular diet without issue through the spring and summer until the past 6 weeks.    Nausea certainly can be worsened by constipation and a number of her medications. Will attempt to stabilize stooling pattern as outlined below.    Noted pt concern that pain episodes are a return to her pre-TPIAT baseline and anxiety that this represents something more sinister. Reviewed recent imaging and discussion from her care team members regarding hepatic dome lesion. Difficult for her to characterize her pain given infrequency (3-4 episodes over the last 6 months) and we discussed symptom tracking as well as concerning signs to  "prompt repeat ED visit.   Plan:   - Referral to neurology for further migraine management.  - Symptom tracking for severe pain episodes - document symptoms, what you were doing at the time, diet in prior 24 hours, what bowels were doing, etc    (K59.00) Constipation, unspecified constipation type  (R10.11) RUQ abdominal pain  Comment:     As above, unclear if any particular triggers for her pain and if it tracks with periods of constipation. She is stooling well with Amitiza though her irregular pattern causes some distress. Unclear if constipation driven by backing off of meds on loose stool days versus emptying of bowels on loose stool days and not needing to have a BM the following day. Pt feels she has some \"bloating\" and, perhaps, nausea on day 1 of no BM, which prompt her to then aggressively treat with additional laxatives when its been only 24 hours between BMs.     Will try to maintain a steady dose of Amitiza with titration of her other medications (particularly senna which can lead to cramping, pain, and more pronounced nausea). Will stop Miralax which seems to worsen bloating at treat with prn bisacodyl. Bowel pattern complicated by need for pancreatic enzymes.   Plan:   - On loose days, hold your senna and colace. Continue your base dose of Amitiza (3 tabs in the morning and 3 at night).   - Stop Miralax altogether.  - On constipated days, use 2 bisacodyl as opposed to Miralax     **    Check with your father about what types of colon polyps he had and what ages they were found as it may affect when you need a colonoscopy.     RTC 4 months    Thank you for this consultation.  It was a pleasure to participate in the care of this patient; please contact us with any further questions.  A total of 60 face-to-face minutes was spent with this patient, >50% of which was counseling regarding the above delineated issues.    Vijaya Genao MD   of Medicine  Division of Gastroenterology, " "Hepatology and Nutrition  [facility]    ---------------------------------------------------------------------------------------------------  HPI:   Ms. 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      1. Abdominal pain    Ms. Loo had issues with severe daily abdominal pain prior to her TPIAT. Overall she felt she was doing well after surgery and was able to be weaned off of narcotics though she states she still follows in the pain clinic. Three or four times since April 2017 (about once every other month), she has experienced pronounced abdominal pain similar in nature to her prior chronic pancreatic pain. The pain is localized in the epigastric region and just under her right 12th rib; the pain does not radiate beyond that point and is described as \"sharp.\" It did wake her from sleep on one occasion prompting an ED visit on 5/20/17. At that time pain was reported in the LUQ and she was seen to on CT have left abdominal wall fat stranding but with no associated cellulitis or intra-abdominal pathology. An MR abd done a month prior demonstrated a subQ fluid collection in this area. She was discharged to home from the ED. An MRI done thereafter in July 2017 did not show persistance of this finding and heterogenous area of enhancement in the dome of the liver (initially seen on 5/20/17 CT) was improved.    She expresses much concern that this is a recurrence of her prior pancreas pain, though she feels it shouldn't happen since \"my pancreas is gone.\" In " "our discussion today, she shares that she has been working with her psychologist in pain clinic on mind-body interventions; they have apparently discussed that this could be a reactivation of prior pain pathways. Ms. Loo finds this interventions helpful sometimes but not always. She cannot provide much more in the way of details of the pain during episodes, what she'd been doing, eating, or how it related to her stool pattern.       Ms. Loo also notes a much less pronounced pain which comes on in the same area, but only with intense exercise. This seems to get better with stopping or walking and has not occurred at other times.    2. Gastroparesis  3. Nausea, vomiting    GES pursued in the setting of chronic nausea and vomiting during her chronic pancreatitis and TPIAT evaluation. She states she had two-hour study at [facility] that may have been consistent with gastroparesis. A follow-up four-hour study was done here on 6/27/16 with 49% seen remaining at 4 hours. Ms. Loo is not sure if she was on narcotics at that time. Per her recollection she didn't start daily pain medications until Fall 2016, but she did use them intermittently prior to that time. A G-J tube was placed in fall 2016 with G-tube used for venting and jejunal extension for tube feeds. After her TPIAT she was able to wean off her TFs and felt she no longer required venting. By spring 2017 she was tolerating a regular diet with no nausea or vomiting and in March 2017 her G-J tube was able to be removed.  Throughout the spring and summer she was on a regular diet and had no bloating, nausea, or vomiting.     Unfortunately about 6 weeks ago she had a \"severe\" migraine HA with associated vomiting. She describes 12 hours of emesis and retching with 12+ hours of HA pain similar to her prior migraines. Since then has had frequent HAs with both prolonged migraines (lasting 3 days per report) and frequent migraines (up to 3-5 days out of a " week). Since that time she feels she has nausea every day, often waking with it. While nauseated she isn't hungry, but by evening feels well and is able to eat an evening meal.  Ms. Loo reports 5 episodes of nonbloody, nonbilious emesis in the last 6 weeks, each of these episodes has occurred during a migraine HA.     She is on chronic amitriptyline for migraine prophylaxis and wonders if her dose should be changed. She has had previously used sumatriptan for migraines, but this last efficacy and some time ago she was switched to zolmitriptan. Unfortunately she does not feel the zolmitriptan is working any longer. She has seen neurology in the past (she thinks several years). For her nausea and vomiting she uses scopolamine patch, procholorperazine, or promethazine all with equal effect. She likes the scopolamine but limits it use due to side effects.        4. Constipation   Ms. Loo reports that she's had constipation ongoing for years which began after she had her first child (about 24 years ago). She denies any issues with her bowels as an infant or child, and reports her baseline pattern was moving her bowels every day to every other day up until her pregnancy.     She had a bowel cleanout around that time and has been on Miralax intermittently since then. She began more regular senna use about 18 months ago and then colace was started a couple months before her TPIAT (this was in the setting of increased pain medication dosing). She has intermittently done bowel cleanouts through her primary care clinic or as recommended by Dr. Phoenix; these cleanouts were generally with Miralax/Gatorade prep and she reported good results.  Post-TPIAT she did require intermittent enemas and suppositories while still on opiates but has not continued these medications.     Currently she is on senna twice daily and colace twice daily since her TPIAT. She had been on Milk of Mg BID and Miralax as needed, but was still  "experiencing constipation. A few months after her surgery she was started on Linzess. She reported severed diarrhea after 1-2 doses and thus discontinued the medication. She was then placed, ultimately titrating to 24 mcg in morning and night. Intially she was having BMs daily, but has since had longer periods of time without BMs.     Her current stooling pattern is as follows:  On loose days will have a cluster of 1-2 stools before breakfast and another 1-3 BMs until noon, Cerrillos 6-7. On these days she has a lot of urgency which is quite bothersome.  On good days will have just two BMs in the morning, Cerrillos 4-5.   On constipated days will have no BMs.      Her pattern is typically several \"good\" days in a row (4-7 good days), then 1-2 loose days (during which she may take 16 mcg Amitiza in the morning as opposed to 24 mcg), and then several constipated days. The first day she has no BM in the morning she will take Miralax in the afternoon, this never works, then she takes a second one at night. She will typically need to take 3-5 doses of Miralax over a couple days and a bisacodyl before she stools. She experiences bloating and increased nausea with Miralax use. She doesn't feel its worked on its own and feels bisacodyl is always needed. When she ultimately moves her bowels, she will have a very large amount of soft stool, with return to her baseline pattern (good day --> loose day --> constipation). Constipated days are most bothersome (compared with loose stools) as she feels pain in RUQ, back, bloating, and nausea (though she's not sure how much nausea is related to her migraines as detailed above).      She denies any issues with nocturnal stools or overt GI bleeding. She notes that she works hard to stay hydrated. She is on Creon, typically 15 tabs per day. On more loose or constipated days she has not done any titrating of her Creon. She denies noting fatty stools/grease on loose stool days.      Her last " colonoscopy was in 2014 with minimal diverticulosis and no polyps. She denies any prior hx of polyps and was told she needed a repeat colonoscopy in 2024. There is no known family history of colon CA though she states her father had an unknown type of polyp in the setting of colitis, found, possibly, when he was in his 40s. She has never undergone any prior anorectal testing.    GERD  Dysphagia    Her GERD is experienced as substernal and throat burning as well as brining up off nocturnal refluxant (would awaken choking) and refluxant with bending over. Her initial evaluation was in, then eventually with Dr. George Flores here. She reports a prior Schatzki's ring which required dilation in the past. Esophageal manometry done here was normal; pH impedence revealed a DeMeester of 24 with positive symptom association.  Recommendations at her most recent esophageal clinic visit in Jan 2016 was for BID PPI with consideration of BID H2 blocker if still symptomatic. A trial of carafate did not improve her GERD. Ms. Loo states that pursuit of Linx procedure was discussed with Dr. George Flores. She has done well with daily lansoprazole and lifestyle/behavioural changes and thus has not pursued it yet.       ROS:    Please see bottom of this note.     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  Document previously scanned on 1/12/17.  Validation form completed and scanned.  Code Status reflects choices in most recent ACP document..  Confirmed/documented designated decision maker(s).  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     Priority: Medium     Acute on chronic pancreatitis (H) 09/03/2016     Priority: Medium     Acute abdominal pain 08/02/2016     Priority: Medium     Abdominal pain, epigastric 10/23/2015     Priority: Medium     Severe protein-calorie malnutrition (H) 06/09/2015     Priority: Medium     Acute pancreatitis 06/03/2015     Priority: Medium     Abdominal pain 06/02/2015     Priority: Medium     S/P ERCP 06/02/2015     Priority: Medium     Abdominal pain, generalized 04/20/2015     Priority: Medium     History of ERCP 04/20/2015     Priority: Medium     Other type of intractable migraine      Priority: Medium     Diagnosis updated by automated process. Provider to review and confirm.       GERD (gastroesophageal reflux disease) 12/01/2010     Priority: Medium     Lumbago 04/18/2005     Priority: Medium     History of L5-S1 degenerative disk disease.         Sprain and strain of other specified sites of hip and thigh 12/09/2002     Priority: Medium     Chondromalacia of patella 12/09/2002     Priority: Medium     Need for prophylactic immunotherapy      Priority: Medium     trees, grass, srw, dust mites, cat       Allergic rhinitis due to other allergen 12/21/2001     Priority: Medium     Hypothyroidism      Priority: Medium     Problem list name updated by automated process. Provider to review       Sensorineural hearing loss 01/10/2005     Priority: Medium     Problem list name updated by automated process. Provider to review       Vertiginous syndrome and labyrinthine disorder 01/10/2005     Priority: Medium     Problem list name updated by automated process. Provider to review         PERTINENT PAST  MEDICAL HISTORY:  Past Medical History:   Diagnosis Date     Allergic rhinitis, cause unspecified      Allergy to other foods     strawberries, apples, celeries, alice, watermelon     Arthritis     left knee     Choledocholithiasis     long after cholecystectomy     Chronic abdominal pain      Chronic constipation      Chronic nausea      Chronic pancreatitis (H)      Degeneration of lumbar or lumbosacral intervertebral disc      Esophageal reflux     w/ hiatal hernia     Gastroparesis      Hiatal hernia      History of pituitary adenoma     s/p resection     Hypothyroidism      Migraines      Mild hyperlipidemia      On tube feeding diet     presence of GJ tube     Pancreatic disease     PD stricture, 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 endometriosis growth     APPENDECTOMY       C  DELIVERY ONLY       C  DELIVERY ONLY      repeat c section with incidental cystotomy with repair     C EXCIS PITUITARY,TRANSNASAL/SEPTAL      pituitary tumor removed for diabetes insipidus     C TOTAL ABDOM HYSTERECTOMY      w/ bilateral salpingoophorectomy      C WRIST ARTHROSCOP,RELEASE XVERS LIG Bilateral 08      SECTION       COLONOSCOPY       ENDOSCOPIC RETROGRADE CHOLANGIOPANCREATOGRAM N/A 2015    Procedure: ENDOSCOPIC RETROGRADE CHOLANGIOPANCREATOGRAM;  Surgeon: Mandeep Park MD;  Location: [facility] OR     ENDOSCOPIC RETROGRADE CHOLANGIOPANCREATOGRAM N/A 2016    Procedure: COMBINED ENDOSCOPIC RETROGRADE CHOLANGIOPANCREATOGRAPHY, PLACE TUBE/STENT;  Surgeon: Mandeep Park MD;  Location:  OR     ENDOSCOPIC RETROGRADE CHOLANGIOPANCREATOGRAM N/A 3/17/2016    Procedure: COMBINED ENDOSCOPIC  RETROGRADE CHOLANGIOPANCREATOGRAPHY, REMOVE FOREIGN BODY OR STENT/TUBE;  Surgeon: Mandeep Park MD;  Location: [facility]     ENDOSCOPIC RETROGRADE CHOLANGIOPANCREATOGRAM N/A 8/2/2016    Procedure: COMBINED ENDOSCOPIC RETROGRADE CHOLANGIOPANCREATOGRAPHY, PLACE TUBE/STENT;  Surgeon: Mandeep Park MD;  Location: [facility]     ENDOSCOPIC RETROGRADE CHOLANGIOPANCREATOGRAM N/A 8/26/2016    Procedure: COMBINED ENDOSCOPIC RETROGRADE CHOLANGIOPANCREATOGRAPHY, REMOVE FOREIGN BODY OR STENT/TUBE;  Surgeon: Mandeep Park MD;  Location: [facility]     ENDOSCOPIC ULTRASOUND UPPER GASTROINTESTINAL TRACT (GI) N/A 10/3/2016    Procedure: ENDOSCOPIC ULTRASOUND, ESOPHAGOSCOPY / UPPER GASTROINTESTINAL TRACT (GI);  Surgeon: Guru Jose Rodas MD;  Location: [facility] OR     ESOPHAGOSCOPY, GASTROSCOPY, DUODENOSCOPY (EGD), COMBINED N/A 6/24/2015    Procedure: COMBINED ESOPHAGOSCOPY, GASTROSCOPY, DUODENOSCOPY (EGD), REMOVE FOREIGN BODY;  Surgeon: Mandeep Park MD;  Location: [facility]     ESOPHAGOSCOPY, GASTROSCOPY, DUODENOSCOPY (EGD), COMBINED N/A 10/25/2015    Procedure: COMBINED ESOPHAGOSCOPY, GASTROSCOPY, DUODENOSCOPY (EGD);  Surgeon: Sammy Amaro MD;  Location: [facility]     ESOPHAGOSCOPY, GASTROSCOPY, DUODENOSCOPY (EGD), COMBINED N/A 10/25/2015    Procedure: COMBINED ESOPHAGOSCOPY, GASTROSCOPY, DUODENOSCOPY (EGD), BIOPSY SINGLE OR MULTIPLE;  Surgeon: Sammy Amaro MD;  Location: [facility]     ESOPHAGOSCOPY, GASTROSCOPY, DUODENOSCOPY (EGD), DILATATION, COMBINED       EXCISE LESION TRUNK N/A 4/17/2017    Procedure: EXCISE LESION TRUNK;  Removal of Abdominal Foreign Body;  Surgeon: Nestor Phoenix MD;  [facility]     HC ESOPH/GAS REFLUX TEST W NASAL IMPED >1 HR N/A 11/19/2015    Procedure: ESOPHAGEAL IMPEDENCE FUNCTION TEST WITH 24 HOUR PH GREATER THAN 1 HOUR;  Surgeon: Thiago Apple MD;  Location: [facility] GI     HC UGI ENDOSCOPY DIAG W BIOPSY  9/17/08     HC UGI ENDOSCOPY DIAG W BIOPSY   9/27/12     HC UGI ENDOSCOPY W ESOPHAGEAL DILATION BALLOON <30MM  9/17/08     HC UGI ENDOSCOPY W EUS N/A 5/5/2015    Procedure: COMBINED ENDOSCOPIC ULTRASOUND, ESOPHAGOSCOPY, GASTROSCOPY, DUODENOSCOPY (EGD);  Surgeon: Wm Dueñas MD;  Location: [facility]     INJECT TRANSVERSUS ABDOMINIS PLANE (TAP) BLOCK BILATERAL Left 9/22/2016    Procedure: INJECT TRANSVERSUS ABDOMINIS PLANE (TAP) BLOCK BILATERAL;  Surgeon: Dickson Corrigan MD;  [facility]: [facility]     laparoscopic pineda  1995     LAPAROSCOPIC PANCREATECTOMY, TRANSPLANT AUTO ISLET CELL N/A 12/28/2016    Procedure: LAPAROSCOPIC PANCREATECTOMY, TRANSPLANT AUTO ISLET CELL;  Surgeon: Nestor Phoenix MD;  Location: [facility]     transphenoidal pituitary resection  1990       ALLERGIES:  Allergies   Allergen Reactions     Apple Anaphylaxis     Depakote [Divalproex Sodium] Other (See Comments)     Chest pain     Zithromax [Azithromycin Dihydrate] Anaphylaxis     Noted in 4/7/08 ER     Darvocet [Propoxyphene N-Apap] Itching     Morphine Nausea and Vomiting and Rash     Nalbuphine Hcl Rash     RASH :nubaine     Zosyn [Piperacillin-Tazobactam In D5w] Rash     Possible allergy, did have a diffuse rash that seemed drug related but could have also been related to soap in the hospital.      Bactrim [Sulfamethoxazole W-Trimethoprim] Other (See Comments) and Nausea and Vomiting     Severely low liver function.     Cats      Compazine [Prochlorperazine] Other (See Comments)     Twitching. Takes Benedryl and is fine     Corticosteroids Other (See Comments)     All oral,IV and injectable steroids are contraindicated (unless in life threatening situations) in [facility] [employer] transplant recipients. They can cause irreversible loss of islet cell function. Please contact patients transplant care coordinator Mecca Watkins RN BSN @ [phone]/Pager [phone], and Endocrinologist prior to administration.     Dust Mites      Grass      Prednisone Other (See Comments)     Insomnia        Ragweeds      Tape [Adhesive Tape] Blisters     Trees      Zofran [Ondansetron] Other (See Comments)     migraine       PERTINENT MEDICATIONS:  Current Outpatient Prescriptions   Medication     scopolamine (TRANSDERM-SCOP, 1.5 MG,) 72 hr patch     Lubiprostone 8 MCG CAPS capsule     ursodiol (ACTIGALL) 300 MG capsule     order for DME     celecoxib (CELEBREX) 100 MG capsule     blood glucose monitoring (PAT CONTOUR NEXT) test strip     amylase-lipase-protease (CREON 24) 39622 UNITS CPEP per EC capsule     insulin glargine (LANTUS) 100 UNIT/ML injection     insulin pen needle (BD KEN U/F) 32G X 4 MM     cetirizine (ZYRTEC) 10 MG tablet     insulin aspart (NOVOLOG PENFILL) 100 UNIT/ML injection     oxyCODONE (ROXICODONE) 5 MG IR tablet     insulin aspart (NOVOLOG PEN) 100 UNIT/ML injection     amitriptyline (ELAVIL) 10 MG tablet     senna-docusate (SENOKOT-S;PERICOLACE) 8.6-50 MG per tablet     prochlorperazine (COMPAZINE) 5 MG tablet     polyethylene glycol (MIRALAX/GLYCOLAX) Packet     diphenhydrAMINE (BENADRYL ALLERGY) 25 MG capsule     levothyroxine (SYNTHROID/LEVOTHROID) 125 MCG tablet     docusate sodium (COLACE) 100 MG capsule     multivitamin CF formula (CHOICEFUL) capsule     Blood Glucose Monitoring Suppl (CONTOUR NEXT EZ MONITOR) W/DEVICE KIT     LANsoprazole (PREVACID) 30 MG CR capsule     aspirin 81 MG EC tablet     Injection Device for insulin (NOVOPEN ECHO) MARCO     glucose 40 % GEL gel     Sharps Container (BD SHARPS ) MISC     blood glucose monitoring (PAT MICROLET) lancets     glucagon (GLUCAGON EMERGENCY) 1 MG kit     No current facility-administered medications for this visit.        SOCIAL HISTORY:  Social History     Social History     Marital status:      Spouse name: Norris     Number of children: 3     Years of education: 16     Occupational History     director [employer]     Social History Main Topics     Smoking status: Former Smoker     Packs/day: 0.50     Years: 6.00      "Types: Cigarettes     Start date: 9/1/1985     Quit date: 1/1/1992     Smokeless tobacco: Never Used      Comment: no 2nd hand     Alcohol use No      Comment: last drink 6/2016  - moderate social     Drug use: No     Sexual activity: Yes     Partners: Male     Birth control/ protection: None      Comment: Hystectomy 1999     Other Topics Concern     Parent/Sibling W/ Cabg, Mi Or Angioplasty Before 65f 55m? No     Blood Transfusions No     Seat Belt Yes     Social History Narrative     with 3 children and a dog.  No smoking, etoh or drug use.  Worked as a  for [employer] in August in the past.  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       FAMILY HISTORY:  Family History   Problem Relation Age of Onset     Eye Disorder Father      cataract, detached retina     Myocardial Infarction Father 60     Lipids Father      CEREBROVASCULAR DISEASE Father      Depression Father      Substance Abuse Father      Anesthesia Reaction Father      stroke right after surgery     Lipids Mother      Hypertension Mother      Thyroid Disease Mother      Eye Disorder Son      ptosis     Eye Disorder Paternal Grandmother      cataract     Eye Disorder Paternal Grandfather      cataract     DIABETES Paternal Grandfather      Eye Disorder Maternal Grandmother      cataract     Thyroid Disease Maternal Grandmother      Coronary Artery Disease Sister      Depression Sister      Depression Son      Anxiety Disorder Son      Thyroid Disease Sister      DIABETES Maternal Grandfather      Depression Nephew      Anxiety Disorder Nephew      Thyroid Disease Nephew      Type 2 Diabetes Cousin      paternal cousin     Past/family/social history reviewed and no changes    PHYSICAL EXAMINATION:  Vitals /63 (BP Location: Left arm, Patient Position: Chair, Cuff Size: Adult Regular)  Pulse 123  Ht 1.727 m (5' 8\")  Wt 67.8 kg (149 lb 6.4 oz)  SpO2 100%  BMI 22.72 kg/m2   Wt   Wt " Readings from Last 2 Encounters:   11/01/17 67.8 kg (149 lb 6.4 oz)   10/20/17 68.4 kg (150 lb 14.4 oz)   Gen: Pt sitting in NAD, interactive and cooperative on exam  Eyes: sclerae anicteric, no injection  ENT:  OP clear, MMM  Cardiac: RRR, nl S1, S2, no peripheral edema  Resp: Clear on anterior exam  GI: Normoactive BS, abd soft, flat, nontender  Skin: Warm, dry, no jaundice, nails appear healthy  Lymph: no submandibular, no cervical, and no supraclavicular LAD  Neuro: alert, oriented, answers questions appropriately      PERTINENT STUDIES:    Orders Only on 10/20/2017   Component Date Value Ref Range Status     Bilirubin Direct 10/20/2017 0.1  0.0 - 0.2 mg/dL Final     Bilirubin Total 10/20/2017 0.7  0.2 - 1.3 mg/dL Final     Albumin 10/20/2017 3.7  3.4 - 5.0 g/dL Final     Protein Total 10/20/2017 7.4  6.8 - 8.8 g/dL Final     Alkaline Phosphatase 10/20/2017 161* 40 - 150 U/L Final     ALT 10/20/2017 22  0 - 50 U/L Final     AST 10/20/2017 23  0 - 45 U/L Final     INR 10/20/2017 1.04  0.86 - 1.14 Final     IGG 10/20/2017 1070  695 - 1620 mg/dL Final     IgG1 10/20/2017 429  300 - 856 mg/dL Final     IgG2 10/20/2017 370  158 - 761 mg/dL Final     IgG3 10/20/2017 83  24 - 192 mg/dL Final     IgG4 10/20/2017 89* 11 - 86 mg/dL Final         Again, thank you for allowing me to participate in the care of your patient.      Sincerely,    Vijaya Genao MD

## 2017-11-01 NOTE — MR AVS SNAPSHOT
After Visit Summary   11/1/2017    Dinora Mcghee    MRN: [id number]           Patient Information     Date Of Birth          1965        Visit Information        Provider Department      11/1/2017 11:40 AM Vijaya Genao MD [facility]        Today's Diagnoses     Migraine without status migrainosus, not intractable, unspecified migraine type    -  1      Care Instructions    1. Check with your father about what types of colon polyps he had and what ages they were found as it may affect when you need a colonoscopy.   2. On loose days, hold your senna and colace. Continue your base dose of Amitiza (3 tabs in the morning and 3 at night).   3. Stop Miralax altogether.  4. On constipated days, use 2 bisacodyl.   5. Referral to neurology for further migraine management.  6. Symptom tracking for severe pain episodes - document symptoms, what you were doing at the time, diet in prior 24 hours, what bowels were doing, etc  7  If you have any questions, please don't hesitate to contact our GI RN Clinic Coordinators at [phone] (select option #3 for nurse line).   7. Follow-up in GI clinic in ~ 4 months            Follow-ups after your visit        Additional Services     NEUROLOGY ADULT REFERRAL       Your provider has referred you for the following:   Consult at [facility]: Neurology Clinic [facility] [phone]   [email or web address]  Migraine Management    Please be aware that coverage of these services is subject to the terms and limitations of your health insurance plan.  Call member services at your health plan with any benefit or coverage questions.      Please bring the following with you to your appointment:    (1) Any X-Rays, CTs or MRIs which have been performed.  Contact the facility where they were done to arrange for  prior to your scheduled appointment.    (2) List of current medications  (3) This  referral request   (4) Any documents/labs given to you for this referral                  Your next 10 appointments already scheduled     Nov 06, 2017  1:00 PM CST   (Arrive by 12:45 PM)   Return Visit with Maria Elena Abdalla, PhD   [facility] ([facility])    [address] [place] [id number]   [phone]            Nov 13, 2017  5:00 PM CST   (Arrive by 4:45 PM)   Return Visit with Maria Elena Abdalla, PhD   [facility] ([facility])    [address] [place] [id number]   [phone]            Nov 20, 2017  5:00 PM CST   (Arrive by 4:45 PM)   Return Visit with Maria Elena Abdalla,    [facility] ([facility])    [address] [place] [address]   [phone]            Jan 16, 2018  2:00 PM CST   (Arrive by 1:45 PM)   Return [place] with Nestor Phoenix MD   [facility] ([facility])    [address] [place] [id number]   [phone]            Feb 21, 2018  9:40 AM CST   (Arrive by 9:25 AM)   Return Visit with Vijaya Genao MD   [facility] ([facility])    [address] [place] [id number]   [phone]            Mar 16, 2018  9:00 AM CDT   (Arrive by 8:45 AM)   Return Visit with Jesse Richardson DO   [facility]umat[facility] ([facility])    [address] [place] [id number] [phone]              Who to contact     Please call your clinic at [phone] to:    Ask questions about your health    Make or cancel appointments    Discuss your medicines    Learn about your test results    Speak to your doctor   If you have  "compliments or concerns about an experience at your clinic, or if you wish to file a complaint, please contact [facility] Patient Relations at [phone] or email us at [email or web address]         Additional Information About Your Visit        MyChart Information     i[employer] gives you secure access to your electronic health record. If you see a primary care provider, you can also send messages to your care team and make appointments. If you have questions, please call your primary care clinic.  If you do not have a primary care provider, please call [phone] and they will assist you.      Vet[employer] is an electronic gateway that provides easy, online access to your medical records. With [employer]et[employer], you can request a clinic appointment, read your test results, renew a prescription or communicate with your care team.     To access your existing account, please contact your [facility] or call [phone] for assistance.        Care EveryWhere ID     This is your Care EveryWhere ID. This could be used by other organizations to access your [facility] medical records  [id number]        Your Vitals Were     Pulse Height Pulse Oximetry BMI (Body Mass Index)          123 1.727 m (5' 8\") 100% 22.72 kg/m2         Blood Pressure from Last 3 Encounters:   11/01/17 114/63   10/20/17 107/70   08/14/17 109/65    Weight from Last 3 Encounters:   11/01/17 67.8 kg (149 lb 6.4 oz)   10/20/17 68.4 kg (150 lb 14.4 oz)   08/14/17 67.1 kg (148 lb)              We Performed the Following     NEUROLOGY ADULT REFERRAL        Primary Care Provider Office Phone # Fax #    Justyna Lawson [phone]       [facility] [address]  [place] [address]        Equal Access to Services     ABHAY HUITRON AH: Hadii monty Helton, waaxda ayalaadaha, qaybta kaalmaedin hopkins, ivan guerra. So wa " [phone].    ATENCIÓN: Si freda champagne, tiene a muñoz disposición servicios gratuitos de asistencia lingüística. Howard yanez [phone].    We comply with applicable federal civil rights laws and [place] laws. We do not discriminate on the basis of race, color, national origin, age, disability, sex, sexual orientation, or gender identity.            Thank you!     Thank you for choosing [facility]  for your care. Our goal is always to provide you with excellent care. Hearing back from our patients is one way we can continue to improve our services. Please take a few minutes to complete the written survey that you may receive in the mail after your visit with us. Thank you!             Your Updated Medication List - Protect others around you: Learn how to safely use, store and throw away your medicines at www.disposemymeds.org.          This list is accurate as of: 11/1/17 12:58 PM.  Always use your most recent med list.                   Brand Name Dispense Instructions for use Diagnosis    amitriptyline 10 MG tablet    ELAVIL    60 tablet    Take 2 tablets (20 mg) by mouth At Bedtime    Itching, Post-pancreatectomy diabetes (H), History of pancreatectomy, Pancreatic insufficiency       amylase-lipase-protease 62738-63926 UNITS Cpep per EC capsule    CREON 24    450 capsule    Take 3-4 capsules by mouth with meals and 1-2 with snacks. Maximum 15 capsules per day.    Acquired total absence of pancreas       aspirin 81 MG EC tablet     90 tablet    Take 1 tablet (81 mg) by mouth daily    High platelet count (H)       BD SHARPS  Misc     1 each    1 Container as needed    Post-pancreatectomy diabetes (H)       blood glucose monitoring lancets     1 Box    Use to test blood sugar 6-8 times daily or as directed.    Acute on chronic pancreatitis (H)       blood glucose monitoring test strip    PAT CONTOUR NEXT    200 strip    Use to test blood sugar 6-8 times daily or as  directed.    Acute on chronic pancreatitis (H), Post-pancreatectomy diabetes (H)       celecoxib 100 MG capsule    celeBREX    60 capsule    Take 1 capsule (100 mg) by mouth 2 times daily        cetirizine 10 MG tablet    zyrTEC     Take 10 mg by mouth daily    Elevated liver enzymes       CONTOUR NEXT EZ MONITOR W/DEVICE Kit      1 Device 6 times daily    Itching, Post-pancreatectomy diabetes (H), History of pancreatectomy, Pancreatic insufficiency       diphenhydrAMINE 25 MG capsule    BENADRYL ALLERGY    56 capsule    Take 1 capsule (25 mg) by mouth every 6 hours as needed for itching or allergies    Itching, Post-pancreatectomy diabetes (H), History of pancreatectomy, Pancreatic insufficiency       docusate sodium 100 MG capsule    COLACE    60 capsule    Take 1 capsule (100 mg) by mouth 2 times daily as needed for constipation,    Itching, Post-pancreatectomy diabetes (H), History of pancreatectomy, Pancreatic insufficiency       glucagon 1 MG kit    GLUCAGON EMERGENCY    1 mg    Inject 1 mg into the muscle once for 1 dose    Post-pancreatectomy diabetes (H)       glucose 40 % Gel gel     3 Tube    Take 15-30 g by mouth every 15 minutes as needed for low blood sugar    Acquired total absence of pancreas       Injection Device for insulin Tika    NOVOPEN ECHO    1 each    1 each daily as needed    Post-pancreatectomy diabetes (H)       * insulin aspart 100 UNIT/ML injection    NovoLOG PEN     aspart medium correction 1 unit per 50 > 130 every 4 hours ? For Pre-Meal  - 179 give 1 unit.  For Pre-Meal  - 230 give 2 units.  For Pre-Meal  - 281 give 3 units.  For Pre-Meal  - 332 give 4 units.    Acquired total absence of pancreas       * insulin aspart 100 UNIT/ML injection    NovoLOG PENFILL    3 mL    Administer subcutaneously 0.5 unit per 45 grams of carbohydrates.    Post-pancreatectomy diabetes (H)       insulin glargine 100 UNIT/ML injection    LANTUS    3 mL    Inject 5 units  subcutaneously every morning.    Post-pancreatectomy diabetes (H)       insulin pen needle 32G X 4 MM    BD KEN U/F    100 each    Use 6-8 times per day    Acquired total absence of pancreas       LANsoprazole 30 MG CR capsule    PREVACID    30 capsule    Take 1 capsule (30 mg) by mouth daily Take 30-60 minutes before a meal.    Post-pancreatectomy diabetes (H), Pancreatic insufficiency       levothyroxine 125 MCG tablet    SYNTHROID/LEVOTHROID    1 tablet    Take 1 tablet (125 mcg), by mouth daily before breakfast.    Itching, Post-pancreatectomy diabetes (H), History of pancreatectomy, Pancreatic insufficiency       Lubiprostone 8 MCG Caps capsule     180 capsule    Take 3 capsules (24mcg ) twice a day or as directed.    Chronic constipation       multivitamin CF formula capsule    CHOICEFUL     Take 1 tablet by mouth daily    Itching, Post-pancreatectomy diabetes (H), History of pancreatectomy, Pancreatic insufficiency       order for DME     1 Device    TENS Unit    Shoulder pain       oxyCODONE 5 MG IR tablet    ROXICODONE    150 tablet    Take 1-2 tablets (5-10 mg) by mouth every 4 hours as needed for moderate to severe pain    Acute post-operative pain       polyethylene glycol Packet    MIRALAX/GLYCOLAX    7 packet    Take 17 g by mouth daily as needed for constipation    Itching, Post-pancreatectomy diabetes (H), History of pancreatectomy, Pancreatic insufficiency       prochlorperazine 5 MG tablet    COMPAZINE    90 tablet    Take two 5 mg tablets by mouth every six hours as needed for nausea.    Itching, Post-pancreatectomy diabetes (H), History of pancreatectomy, Pancreatic insufficiency       senna-docusate 8.6-50 MG per tablet    SENOKOT-S;PERICOLACE    100 tablet    Take 1 tablet by mouth 2 times daily as needed for constipation    Itching, Post-pancreatectomy diabetes (H), History of pancreatectomy, Pancreatic insufficiency       TRANSDERM-SCOP (1.5 MG) 72 hr patch   Generic drug:  scopolamine       Place 1 patch onto the skin every 72 hours    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, Need for influenza vaccination       ursodiol 300 MG capsule    ACTIGALL    60 capsule    Take 1 capsule (300 mg) by mouth 2 times daily    Elevated LFTs       * Notice:  This list has 2 medication(s) that are the same as other medications prescribed for you. Read the directions carefully, and ask your doctor or other care provider to review them with you.

## 2017-11-03 ASSESSMENT — ACTIVITIES OF DAILY LIVING (ADL): I_KEEP_THINKING_ABOUT_HOW_BADLY_I_WANT_THE_PAIN_TO_STOP: 1 = TO A SLIGHT DEGREE

## 2017-11-06 ENCOUNTER — OFFICE VISIT (OUTPATIENT)
Dept: ANESTHESIOLOGY | Facility: CLINIC | Age: 52
End: 2017-11-06

## 2017-11-06 DIAGNOSIS — G89.29 CHRONIC ABDOMINAL PAIN: Primary | ICD-10-CM

## 2017-11-06 DIAGNOSIS — R10.9 CHRONIC ABDOMINAL PAIN: Primary | ICD-10-CM

## 2017-11-06 DIAGNOSIS — F43.89 ADJUSTMENT REACTION TO CHRONIC STRESS: Primary | ICD-10-CM

## 2017-11-06 RX ORDER — CELECOXIB 100 MG/1
100 CAPSULE ORAL 2 TIMES DAILY
Qty: 60 CAPSULE | Refills: 1 | Status: SHIPPED | OUTPATIENT
Start: 2017-11-06 | End: 2017-12-22

## 2017-11-06 NOTE — MR AVS SNAPSHOT
After Visit Summary   11/6/2017    Dinora Mcghee    MRN: [id number]           Patient Information     Date Of Birth          1965        Visit Information        Provider Department      11/6/2017 1:00 PM Maria Elena Abdalla,  [facility]        Today's Diagnoses     Adjustment reaction to chronic stress    -  1       Follow-ups after your visit        Follow-up notes from your care team     Return in about 1 week (around 11/13/2017) for pain management psychotherapy.      Your next 10 appointments already scheduled     Nov 13, 2017  5:00 PM CST   (Arrive by 4:45 PM)   Return Visit with Maria Elena Abdalla PhD   [facility] ([facility])    [address] [place] [address]   [phone]            Nov 20, 2017  5:00 PM CST   (Arrive by 4:45 PM)   Return Visit with Maria Elena Abdalla PhD   [facility] ([facility])    [address] [place] [address]   [phone]            Dec 20, 2017  8:00 AM CST   (Arrive by 7:45 AM)   New Patient Visit with Ruiz Canchola MD   [facility] Neurology ([facility])    [address] [place] [address]   [phone]            Jan 16, 2018  2:00 PM CST   (Arrive by 1:45 PM)   Return [place] with Nestor Phoenix MD   [facility] Solid Organ Transplant ([facility])    [address] [place] [address]   [phone]            Feb 21, 2018  9:40 AM CST   (Arrive by 9:25 AM)   Return Visit with Vijaya Genao MD   [facility] Gastroenterology [facility] IBD [facility] ([facility])    [address] [place] [address]   [phone]            Mar 16, 2018  9:00 AM CDT   (Arrive by  8:45 AM)   Return Visit with Jesse Richardson,    [facility] Rheumatology ([facility])    [address] [facility]  [address] [facility] [address] [phone]              Who to contact     Please call your clinic at [phone] to:    Ask questions about your health    Make or cancel appointments    Discuss your medicines    Learn about your test results    Speak to your doctor   If you have compliments or concerns about an experience at your clinic, or if you wish to file a complaint, please contact [facility] Patient Relations at [phone] or email us at [email or web address]         Additional Information About Your Visit        Responsible [employer] Information     [employer] gives you secure access to your electronic health record. If you see a primary care provider, you can also send messages to your care team and make appointments. If you have questions, please call your primary care clinic.  If you do not have a primary care provider, please call [phone] and they will assist you.      [employer] is an electronic gateway that provides easy, online access to your medical records. With [employer], you can request a clinic appointment, read your test results, renew a prescription or communicate with your care team.     To access your existing account, please contact your [facility] or call [phone] for assistance.        Care EveryWhere ID     This is your Care EveryWhere ID. This could be used by other organizations to access your [place] medical records  [id number]         Blood Pressure from Last 3 Encounters:   11/01/17 114/63   10/20/17 107/70   08/14/17 109/65    Weight from Last 3 Encounters:   11/01/17 67.8 kg (149 lb 6.4 oz)   10/20/17 68.4 kg (150 lb 14.4 oz)   08/14/17 67.1 kg (148 lb)              Today, you had the following     No orders found for display         Where to get your medicines      These  medications were sent to [id number] - [place], [place] - [address]  [address] [facility], [place] [address]     Phone:  [phone]     celecoxib 100 MG capsule          Primary Care Provider Office Phone # Fax #    Justyna Lawson [phone]       [facility] [address]  [facility] [address]        Equal Access to Services     [facility]: Hadii aad ku hadasho Soomaali, waaxda luqadaha, qaybta kaalmada adeegyada, waxay idiin hayaan adeeg kharash la'aan ah. So [facility] [phone].    ATENCIÓN: Si ismala espcyrus, tiene a muñoz disposición servicios gratuitos de asistencia lingüística. Llame al [phone].    We comply with applicable federal civil rights laws and [place] laws. We do not discriminate on the basis of race, color, national origin, age, disability, sex, sexual orientation, or gender identity.            Thank you!     Thank you for choosing [facility]  for your care. Our goal is always to provide you with excellent care. Hearing back from our patients is one way we can continue to improve our services. Please take a few minutes to complete the written survey that you may receive in the mail after your visit with us. Thank you!             Your Updated Medication List - Protect others around you: Learn how to safely use, store and throw away your medicines at www.disposemymeds.org.          This list is accurate as of: 11/6/17  4:59 PM.  Always use your most recent med list.                   Brand Name Dispense Instructions for use Diagnosis    amitriptyline 10 MG tablet    ELAVIL    60 tablet    Take 2 tablets (20 mg) by mouth At Bedtime    Itching, Post-pancreatectomy diabetes (H), History of pancreatectomy, Pancreatic insufficiency       amylase-lipase-protease 39943-[address] UNITS Cpep per EC capsule    CREON 24    450 capsule    Take 3-4 capsules by mouth with meals and 1-2 with snacks. Maximum 15 capsules per day.     Acquired total absence of pancreas       aspirin 81 MG EC tablet     90 tablet    Take 1 tablet (81 mg) by mouth daily    High platelet count (H)       BD SHARPS  Misc     1 each    1 Container as needed    Post-pancreatectomy diabetes (H)       blood glucose monitoring lancets     1 Box    Use to test blood sugar 6-8 times daily or as directed.    Acute on chronic pancreatitis (H)       blood glucose monitoring test strip    PAT CONTOUR NEXT    200 strip    Use to test blood sugar 6-8 times daily or as directed.    Acute on chronic pancreatitis (H), Post-pancreatectomy diabetes (H)       celecoxib 100 MG capsule    celeBREX    60 capsule    Take 1 capsule (100 mg) by mouth 2 times daily    Chronic abdominal pain       cetirizine 10 MG tablet    zyrTEC     Take 10 mg by mouth daily    Elevated liver enzymes       CONTOUR NEXT EZ MONITOR W/DEVICE Kit      1 Device 6 times daily    Itching, Post-pancreatectomy diabetes (H), History of pancreatectomy, Pancreatic insufficiency       diphenhydrAMINE 25 MG capsule    BENADRYL ALLERGY    56 capsule    Take 1 capsule (25 mg) by mouth every 6 hours as needed for itching or allergies    Itching, Post-pancreatectomy diabetes (H), History of pancreatectomy, Pancreatic insufficiency       docusate sodium 100 MG capsule    COLACE    60 capsule    Take 1 capsule (100 mg) by mouth 2 times daily as needed for constipation,    Itching, Post-pancreatectomy diabetes (H), History of pancreatectomy, Pancreatic insufficiency       glucagon 1 MG kit    GLUCAGON EMERGENCY    1 mg    Inject 1 mg into the muscle once for 1 dose    Post-pancreatectomy diabetes (H)       glucose 40 % Gel gel     3 Tube    Take 15-30 g by mouth every 15 minutes as needed for low blood sugar    Acquired total absence of pancreas       Injection Device for insulin Tika    NOVOPEN ECHO    1 each    1 each daily as needed    Post-pancreatectomy diabetes (H)       * insulin aspart 100 UNIT/ML injection     NovoLOG PEN     aspart medium correction 1 unit per 50 > 130 every 4 hours ? For Pre-Meal  - 179 give 1 unit.  For Pre-Meal  - 230 give 2 units.  For Pre-Meal  - 281 give 3 units.  For Pre-Meal  - 332 give 4 units.    Acquired total absence of pancreas       * insulin aspart 100 UNIT/ML injection    NovoLOG PENFILL    3 mL    Administer subcutaneously 0.5 unit per 45 grams of carbohydrates.    Post-pancreatectomy diabetes (H)       insulin glargine 100 UNIT/ML injection    LANTUS    3 mL    Inject 5 units subcutaneously every morning.    Post-pancreatectomy diabetes (H)       insulin pen needle 32G X 4 MM    BD KEN U/F    100 each    Use 6-8 times per day    Acquired total absence of pancreas       LANsoprazole 30 MG CR capsule    PREVACID    30 capsule    Take 1 capsule (30 mg) by mouth daily Take 30-60 minutes before a meal.    Post-pancreatectomy diabetes (H), Pancreatic insufficiency       levothyroxine 125 MCG tablet    SYNTHROID/LEVOTHROID    1 tablet    Take 1 tablet (125 mcg), by mouth daily before breakfast.    Itching, Post-pancreatectomy diabetes (H), History of pancreatectomy, Pancreatic insufficiency       Lubiprostone 8 MCG Caps capsule     180 capsule    Take 3 capsules (24mcg ) twice a day or as directed.    Chronic constipation       multivitamin CF formula capsule    CHOICEFUL     Take 1 tablet by mouth daily    Itching, Post-pancreatectomy diabetes (H), History of pancreatectomy, Pancreatic insufficiency       order for DME     1 Device    TENS Unit    Shoulder pain       oxyCODONE IR 5 MG tablet    ROXICODONE    150 tablet    Take 1-2 tablets (5-10 mg) by mouth every 4 hours as needed for moderate to severe pain    Acute post-operative pain       polyethylene glycol Packet    MIRALAX/GLYCOLAX    7 packet    Take 17 g by mouth daily as needed for constipation    Itching, Post-pancreatectomy diabetes (H), History of pancreatectomy, Pancreatic insufficiency        prochlorperazine 5 MG tablet    COMPAZINE    90 tablet    Take two 5 mg tablets by mouth every six hours as needed for nausea.    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 Read the directions carefully, and ask your doctor or other care provider to review them with you.

## 2017-11-06 NOTE — PROGRESS NOTES
[facility]   Comprehensive Pain Program   Psychology Progress Note    Patient Name: Dinora Mcghee    YOB: 1965   Medical Record Number: [id number]  Date: 11/6/2017              SUBJECTIVE              Interval history:  Relieved by GI visit but still anxious and  mental about herself and her body.   Why am I not OK  Now!   Identified anger at her body.   Stress/ guilt due to sons mental illness  And stress to deaths of several acquaintances    had alternating constipation and diarrhea    OBJECTIVE:              Length of Visit: 60 minutes        Mental status: Mild Distress         Session objective:   Continued behavioral pain management skill improvement         Behavioral inventions and response:                          CBT    On her negative self talk   And its role     And psycho ed re SNS and gut ( in light of recent work ups)   Likely  IBS            Breathing Imagery                                 reframe that body is supporting a change away from workaholism                                                ASSESSMENT              Diagnoses:                                               F43.8      Other stress related disorder                                                               Psychosocial and Contextual Factors: worsened         Progress toward goals: as expected.              Pain status since onset of pain services: had fluctuating course           Emotional status since onset of pain services: had fluctuating course       Medication / chemical use concerns: None        PLAN:               Next Appointment: Dinora Mcghee will schedule a follow-up appointment in 1 weeks.         Assignment: Establish a daily routine of stretching and exercise and Healthy breathing skills  for daily use         Objectives / interventions for next session:          Improve pain management skills: Goals include:  Learn constructive cognitive response to pain triggers and Cognitive  therapy: create constructive thought processes and beliefs regarding pain               Maria Elena Abdalla, Ph.D., L.P. ...............11/6/2017 1:05 PM                Medical Psychologist

## 2017-11-13 ENCOUNTER — OFFICE VISIT (OUTPATIENT)
Dept: ANESTHESIOLOGY | Facility: CLINIC | Age: 52
End: 2017-11-13

## 2017-11-13 DIAGNOSIS — K59.09 CHRONIC CONSTIPATION: ICD-10-CM

## 2017-11-13 DIAGNOSIS — F43.89 ADJUSTMENT REACTION TO CHRONIC STRESS: Primary | ICD-10-CM

## 2017-11-13 NOTE — PROGRESS NOTES
[facility]   Comprehensive Pain Program   Psychology Progress Note    Patient Name: Dinora Mcghee    YOB: 1965   Medical Record Number: [id number]  Date: 11/13/2017              SUBJECTIVE                 Interval history:  Only had one migraine this week and colon is behaving better.    And was able to eat most things.   No nausea med in 2 weeks    Realized this was time frame is 3 year anniversary of severe pancreatitis attack which was the beginning of her CP.   That awareness triggers some increase in pain  Parents moving back in    OBJECTIVE:              Length of Visit: 55 minutes        Mental status: No Distress - Normal Mental Status upon arrival         Session objective:   Continued behavioral pain management skill improvement         Behavioral inventions and response:                           CBT  Reminders re neg self talk            Breathing Imagery   Along with                Resourced Brainspotting/Eye position therapy [facility]  is memory of ED arrival and 20, 000 Lipase.   And she thought she was dying                                   Resource spot   Special place  And then mini attachment with special person   .          Beginning  SUDS = re memory-  8   General distress   But as she processed her abdominal pain with radiation to back appeared and she processed this and then intense nausea appeared  And she processed              Ending SUDS  =  1       And able to remember that night without any physical distress and minimal emotional distress    She was shocked to discover how much a memory could trigger intense physical experiences.   Happy with the change                                                              ASSESSMENT              Diagnoses:                                                F43.8      Other stress related disorder                                                               Psychosocial and Contextual Factors: had fluctuating course         Progress  "toward goals: good.              Pain status since onset of pain services: improved           Emotional status since onset of pain services: improved       Medication / chemical use concerns: None        PLAN:               Next Appointment: Dinora Mcghee will schedule a follow-up appointment in 1-2 weeks.         Assignment: Establish a daily routine of stretching and exercise, Healthy breathing skills  for daily use, Utilize  Guided imagery  \"EASE  PAIN\"  at least once a day and Utilize Bibliotherapy    Self Esteem by Patel         Objectives / interventions for next session:          Improve pain management skills: Goals include:  Resource Brainspotting/Eye Position therapy               Maria Elena Abdalla, Ph.D., L.P. ...............11/13/2017 5:02 PM                Medical Psychologist                       "
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
## 2017-11-14 RX ORDER — LUBIPROSTONE 8 UG/1
CAPSULE ORAL
Qty: 180 CAPSULE | Refills: 0 | Status: SHIPPED | OUTPATIENT
Start: 2017-11-14 | End: 2017-12-26

## 2017-11-16 DIAGNOSIS — L29.9 ITCHING: ICD-10-CM

## 2017-11-16 DIAGNOSIS — K86.89 PANCREATIC INSUFFICIENCY: ICD-10-CM

## 2017-11-16 DIAGNOSIS — E89.1 POST-PANCREATECTOMY DIABETES (H): ICD-10-CM

## 2017-11-16 DIAGNOSIS — E13.9 POST-PANCREATECTOMY DIABETES (H): ICD-10-CM

## 2017-11-16 DIAGNOSIS — Z90.410 HISTORY OF PANCREATECTOMY: ICD-10-CM

## 2017-11-16 DIAGNOSIS — Z90.410 POST-PANCREATECTOMY DIABETES (H): ICD-10-CM

## 2017-11-17 ENCOUNTER — MYC MEDICAL ADVICE (OUTPATIENT)
Dept: GASTROENTEROLOGY | Facility: CLINIC | Age: 52
End: 2017-11-17

## 2017-11-17 ASSESSMENT — ACTIVITIES OF DAILY LIVING (ADL): I_KEEP_THINKING_ABOUT_HOW_BADLY_I_WANT_THE_PAIN_TO_STOP: 1 = TO A SLIGHT DEGREE

## 2017-11-20 ENCOUNTER — OFFICE VISIT (OUTPATIENT)
Dept: ANESTHESIOLOGY | Facility: CLINIC | Age: 52
End: 2017-11-20

## 2017-11-20 DIAGNOSIS — F43.89 ADJUSTMENT REACTION TO CHRONIC STRESS: Primary | ICD-10-CM
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
## 2017-11-30 ASSESSMENT — ACTIVITIES OF DAILY LIVING (ADL): I_KEEP_THINKING_ABOUT_HOW_BADLY_I_WANT_THE_PAIN_TO_STOP: 0 = NOT AT ALL

## 2017-12-01 ENCOUNTER — OFFICE VISIT (OUTPATIENT)
Dept: ANESTHESIOLOGY | Facility: CLINIC | Age: 52
End: 2017-12-01

## 2017-12-01 DIAGNOSIS — F43.89 ADJUSTMENT REACTION TO CHRONIC STRESS: Primary | ICD-10-CM

## 2017-12-01 NOTE — PROGRESS NOTES
[facility]   Comprehensive Pain Program   Psychology Progress Note    Patient Name: Dinora Mcghee    YOB: 1965   Medical Record Number: [id number]  Date: 12/1/2017              SUBJECTIVE              Interval history: mostly pleased with her progress.   Still a struggle to juggle her part time  job and the  Needs of her body.   Still has occasional bouts of diarrhea and  In midst of a community speech had to cope with a migraine.    And is exhausted when she has mandatory longer days at work.  She learning to have healthy boundaries, but saying no is a new thing for her.  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    Good communication with       OBJECTIVE:              Length of Visit: 55 minutes        Mental status: No Distress - Normal Mental Status          Session objective:   Continued behavioral pain management skill improvement         Behavioral inventions and response:                          CBT  On boundaries and positive self talk and change from her former workaholism            Breathing Imagery   Taught abdominal breathing to use                                         ASSESSMENT              Diagnoses:                                                F43.8      Other stress related disorder                                                               Psychosocial and Contextual Factors: had fluctuating course         Progress toward goals: satisfactory.              Pain status since onset of pain services: improved           Emotional status since onset of pain services: improved       Medication / chemical use concerns: None        PLAN:               Next Appointment: Dinora Mcghee will schedule a follow-up appointment in 6 weeks.         Assignment: Establish a daily routine of stretching and exercise, Healthy breathing skills  for daily use and auditory EMDR         Objectives / interventions for next session:          Improve pain management skills:  Goals include:  Learn to pace and moderate physical activity, Learn constructive cognitive response to pain triggers and Cognitive therapy: create constructive thought processes and beliefs regarding pain    I discussed that I think she needs a year of adjustment to her part time job before she goes full time.   She needs slow adjustments or she sill have excess SNS reactivity and her body will respond negatively.           Maria Elena Abdalla, Ph.D., L.P. ...............12/1/2017 2:03 PM                Medical Psychologist

## 2017-12-01 NOTE — MR AVS SNAPSHOT
After Visit Summary   12/1/2017    Dinora Mcghee    MRN: [id number]           Patient Information     Date Of Birth          1965        Visit Information        Provider Department      12/1/2017 2:00 PM Maria Elena Abdalla, PhD [facility] for Comprehensive Pain Management        Today's Diagnoses     Adjustment reaction to chronic stress    -  1       Follow-ups after your visit        Follow-up notes from your care team     Return in about 1 month (around 1/1/2018).      Your next 10 appointments already scheduled     Dec 20, 2017  8:00 AM CST   (Arrive by 7:45 AM)   New Patient Visit with Ruiz Canchola MD   [facility] ([facility])    [address] [place] [address]  [facility] [id number] [phone]            Jan 16, 2018  2:00 PM CST   (Arrive by 1:45 PM)   Return [place] with Nestor Phoenix MD   [facility] ([facility])    [address] [place] [address]  [facility] [address]   [phone]            Feb 28, 2018 11:00 AM CST   (Arrive by 10:45 AM)   Return Visit with Vijaya Genao MD   [facility] ([facility])    [address] [place] [address]  [facility] [address] [phone]            Mar 16, 2018  9:00 AM CDT   (Arrive by 8:45 AM)   Return Visit with Jesse Richardson DO   [facility] ([facility])    [address] [place] [address]  [facility] [id number] [phone]              Who to contact     Please call your clinic at [phone] to:    Ask questions about your health    Make or cancel appointments    Discuss your medicines    Learn about your test results    Speak to your doctor   If you have compliments or concerns about an experience at your clinic, or if you wish to file a complaint, please contact [facility] Patient  Relations at [phone] or email us at [email or web address]         Additional Information About Your Visit        [employer] Information     GRAY[employer] gives you secure access to your electronic health record. If you see a primary care provider, you can also send messages to your care team and make appointments. If you have questions, please call your primary care clinic.  If you do not have a primary care provider, please call [phone] and they will assist you.      G[employer]L is an electronic gateway that provides easy, online access to your medical records. With GRAYL, you can request a clinic appointment, read your test results, renew a prescription or communicate with your care team.     To access your existing account, please contact your [facility] or call [phone] for assistance.        Care EveryWhere ID     This is your Care EveryWhere ID. This could be used by other organizations to access your [place] medical records  [id number]         Blood Pressure from Last 3 Encounters:   11/01/17 114/63   10/20/17 107/70   08/14/17 109/65    Weight from Last 3 Encounters:   11/01/17 67.8 kg (149 lb 6.4 oz)   10/20/17 68.4 kg (150 lb 14.4 oz)   08/14/17 67.1 kg (148 lb)              Today, you had the following     No orders found for display       Primary Care Provider Office Phone # Fax #    Justyna Lawson [phone]       [facility] [place] [address]  [place] [address]        Equal Access to Services     NIGEL HUITRON AH: Hadii aad ku hadasho Soomaali, waaxda luqadaha, qaybta kaalmada adeegyada, ivan yates haybharati wallace . So [facility] [phone].    ATENCIÓN: Si habla español, tiene a muoñz disposición servicios gratuitos de asistencia lingüística. Llame al [phone].    We comply with applicable federal civil rights laws and [place] laws. We do not discriminate on the basis of race, color, national origin, age,  disability, sex, sexual orientation, or gender identity.            Thank you!     Thank you for choosing [facility]  for your care. Our goal is always to provide you with excellent care. Hearing back from our patients is one way we can continue to improve our services. Please take a few minutes to complete the written survey that you may receive in the mail after your visit with us. Thank you!             Your Updated Medication List - Protect others around you: Learn how to safely use, store and throw away your medicines at www.disposemymeds.org.          This list is accurate as of: 12/1/17  3:16 PM.  Always use your most recent med list.                   Brand Name Dispense Instructions for use Diagnosis    amitriptyline 10 MG tablet    ELAVIL    60 tablet    Take 2 tablets (20 mg) by mouth At Bedtime    Itching, Post-pancreatectomy diabetes (H), History of pancreatectomy, Pancreatic insufficiency       amylase-lipase-protease 24825-[address] UNITS Cpep per EC capsule    CREON 24    450 capsule    Take 3-4 capsules by mouth with meals and 1-2 with snacks. Maximum 15 capsules per day.    Acquired total absence of pancreas       aspirin 81 MG EC tablet     90 tablet    Take 1 tablet (81 mg) by mouth daily    High platelet count (H)       BD SHARPS  Misc     1 each    1 Container as needed    Post-pancreatectomy diabetes (H)       blood glucose monitoring lancets     1 Box    Use to test blood sugar 6-8 times daily or as directed.    Acute on chronic pancreatitis (H)       * blood glucose monitoring test strip    PAT CONTOUR NEXT    200 strip    Use to test blood sugar 6-8 times daily or as directed.    Acute on chronic pancreatitis (H), Post-pancreatectomy diabetes (H)       * blood glucose monitoring test strip    PAT CONTOUR NEXT    2 Box    Use to test blood sugar 6 times daily or as directed.    Post-pancreatectomy diabetes (H)       celecoxib 100 MG capsule     celeBREX    60 capsule    Take 1 capsule (100 mg) by mouth 2 times daily    Chronic abdominal pain       cetirizine 10 MG tablet    zyrTEC     Take 10 mg by mouth daily    Elevated liver enzymes       CONTOUR NEXT EZ MONITOR W/DEVICE Kit      1 Device 6 times daily    Itching, Post-pancreatectomy diabetes (H), History of pancreatectomy, Pancreatic insufficiency       diphenhydrAMINE 25 MG capsule    BENADRYL ALLERGY    56 capsule    Take 1 capsule (25 mg) by mouth every 6 hours as needed for itching or allergies    Itching, Post-pancreatectomy diabetes (H), History of pancreatectomy, Pancreatic insufficiency       docusate sodium 100 MG capsule    COLACE    60 capsule    Take 1 capsule (100 mg) by mouth 2 times daily as needed for constipation,    Itching, Post-pancreatectomy diabetes (H), History of pancreatectomy, Pancreatic insufficiency       glucagon 1 MG kit    GLUCAGON EMERGENCY    1 mg    Inject 1 mg into the muscle once for 1 dose    Post-pancreatectomy diabetes (H)       glucose 40 % Gel gel     3 Tube    Take 15-30 g by mouth every 15 minutes as needed for low blood sugar    Acquired total absence of pancreas       Injection Device for insulin Tika    NOVOPEN ECHO    1 each    1 each daily as needed    Post-pancreatectomy diabetes (H)       * insulin aspart 100 UNIT/ML injection    NovoLOG PEN     aspart medium correction 1 unit per 50 > 130 every 4 hours ? For Pre-Meal  - 179 give 1 unit.  For Pre-Meal  - 230 give 2 units.  For Pre-Meal  - 281 give 3 units.  For Pre-Meal  - 332 give 4 units.    Acquired total absence of pancreas       * insulin aspart 100 UNIT/ML injection    NovoLOG PENFILL    3 mL    Administer subcutaneously 0.5 unit per 45 grams of carbohydrates.    Post-pancreatectomy diabetes (H)       insulin glargine 100 UNIT/ML injection    LANTUS    3 mL    Inject 5 units subcutaneously every morning.    Post-pancreatectomy diabetes (H)       insulin pen needle 32G X 4  MM    BD KEN U/F    100 each    Use 6-8 times per day    Acquired total absence of pancreas       LANsoprazole 30 MG CR capsule    PREVACID    30 capsule    Take 1 capsule (30 mg) by mouth daily Take 30-60 minutes before a meal.    Post-pancreatectomy diabetes (H), Pancreatic insufficiency       levothyroxine 125 MCG tablet    SYNTHROID/LEVOTHROID    1 tablet    Take 1 tablet (125 mcg), by mouth daily before breakfast.    Itching, Post-pancreatectomy diabetes (H), History of pancreatectomy, Pancreatic insufficiency       Lubiprostone 8 MCG Caps capsule     180 capsule    Take 3 capsules (24mcg ) twice a day or as directed.    Chronic constipation       multivitamin CF formula capsule    CHOICEFUL     Take 1 tablet by mouth daily    Itching, Post-pancreatectomy diabetes (H), History of pancreatectomy, Pancreatic insufficiency       order for DME     1 Device    TENS Unit    Shoulder pain       oxyCODONE IR 5 MG tablet    ROXICODONE    150 tablet    Take 1-2 tablets (5-10 mg) by mouth every 4 hours as needed for moderate to severe pain    Acute post-operative pain       polyethylene glycol Packet    MIRALAX/GLYCOLAX    7 packet    Take 17 g by mouth daily as needed for constipation    Itching, Post-pancreatectomy diabetes (H), History of pancreatectomy, Pancreatic insufficiency       prochlorperazine 5 MG tablet    COMPAZINE    90 tablet    Take two 5 mg tablets by mouth every six hours as needed for nausea.    Itching, Post-pancreatectomy diabetes (H), History of pancreatectomy, Pancreatic insufficiency       senna-docusate 8.6-50 MG per tablet    SENOKOT-S;PERICOLACE    100 tablet    Take 1 tablet by mouth 2 times daily as needed for constipation    Itching, Post-pancreatectomy diabetes (H), History of pancreatectomy, Pancreatic insufficiency       TRANSDERM-SCOP (1.5 MG) 72 hr patch   Generic drug:  scopolamine      Place 1 patch onto the skin every 72 hours    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, Need for influenza  vaccination       ursodiol 300 MG capsule    ACTIGALL    60 capsule    Take 1 capsule (300 mg) by mouth 2 times daily    Elevated LFTs       * Notice:  This list has 4 medication(s) that are the same as other medications prescribed for you. Read the directions carefully, and ask your doctor or other care provider to review them with you.

## 2017-12-15 NOTE — TELEPHONE ENCOUNTER
APPT INFO    Date /Time: 12/20/17,8am    Reason for Appt: Migraines    Ref Provider/Clinic: [facility]   Are there internal records? Yes/No?  IF YES, list clinic names:  GI Medicine    Are there outside records? Yes/No? No   Patient Contact (Y/N) & Call Details: No, referred    Action:      OUTSIDE RECORDS CHECKLIST     CLINIC NAME COMMENTS REC (x) IMG (x)

## 2017-12-18 ASSESSMENT — ENCOUNTER SYMPTOMS
SNORES LOUDLY: 0
INCREASED ENERGY: 1
VOMITING: 0
HYPOTENSION: 0
SEIZURES: 0
ORTHOPNEA: 1
HALLUCINATIONS: 0
CHILLS: 1
NUMBNESS: 0
EYE PAIN: 1
SPUTUM PRODUCTION: 1
CONSTIPATION: 1
EXERCISE INTOLERANCE: 1
LEG PAIN: 0
JAUNDICE: 0
NIGHT SWEATS: 1
EYE WATERING: 0
BLOATING: 1
COUGH DISTURBING SLEEP: 1
ALTERED TEMPERATURE REGULATION: 1
DECREASED APPETITE: 1
WEIGHT GAIN: 0
TREMORS: 0
SHORTNESS OF BREATH: 1
NERVOUS/ANXIOUS: 1
NAUSEA: 1
PARALYSIS: 0
DECREASED CONCENTRATION: 1
POLYDIPSIA: 0
PALPITATIONS: 0
TASTE DISTURBANCE: 0
NECK MASS: 0
BOWEL INCONTINENCE: 0
BLOOD IN STOOL: 0
WEAKNESS: 0
INSOMNIA: 1
FATIGUE: 1
POSTURAL DYSPNEA: 1
ABDOMINAL PAIN: 1
MEMORY LOSS: 1
LIGHT-HEADEDNESS: 1
DIARRHEA: 1
WEIGHT LOSS: 0
HOARSE VOICE: 1
RECTAL PAIN: 0
SPEECH CHANGE: 0
COUGH: 1
POLYPHAGIA: 0
TROUBLE SWALLOWING: 1
DISTURBANCES IN COORDINATION: 0
SYNCOPE: 0
SLEEP DISTURBANCES DUE TO BREATHING: 0
EYE REDNESS: 0
EYE IRRITATION: 0
HEADACHES: 1
PANIC: 0
DIZZINESS: 0
SORE THROAT: 1
LOSS OF CONSCIOUSNESS: 0
DOUBLE VISION: 0
TINGLING: 0
FEVER: 0
DYSPNEA ON EXERTION: 0
WHEEZING: 0
SINUS PAIN: 1
SINUS CONGESTION: 1
HYPERTENSION: 0
DEPRESSION: 1
HEARTBURN: 0
SMELL DISTURBANCE: 0
HEMOPTYSIS: 0

## 2017-12-20 ENCOUNTER — PRE VISIT (OUTPATIENT)
Dept: NEUROLOGY | Facility: CLINIC | Age: 52
End: 2017-12-20

## 2017-12-20 ENCOUNTER — OFFICE VISIT (OUTPATIENT)
Dept: NEUROLOGY | Facility: CLINIC | Age: 52
End: 2017-12-20
Payer: COMMERCIAL

## 2017-12-20 VITALS — HEART RATE: 92 BPM | DIASTOLIC BLOOD PRESSURE: 74 MMHG | SYSTOLIC BLOOD PRESSURE: 115 MMHG | HEIGHT: 68 IN

## 2017-12-20 DIAGNOSIS — R51.9 HEADACHE DISORDER: Primary | ICD-10-CM

## 2017-12-20 RX ORDER — ESTRADIOL 10 UG/1
INSERT VAGINAL
COMMUNITY
Start: 2017-07-27 | End: 2019-09-17

## 2017-12-20 RX ORDER — AMITRIPTYLINE HYDROCHLORIDE 10 MG/1
10 TABLET ORAL AT BEDTIME
Qty: 120 TABLET | Refills: 3 | Status: SHIPPED | OUTPATIENT
Start: 2017-12-20 | End: 2018-02-02

## 2017-12-20 NOTE — LETTER
2017       RE: Dinora Mcghee  [address] [facility] [address]     Dear Colleague,    Thank you for referring your patient, Dinora Mcghee, to the [facility] at [facility]. Please see a copy of my visit note below.    2017      RE: Dinora Loo   MRN: [id number]   : 1965      Dear Dr. Genao:      Thank you for referring Dinora Loo.  The patient's chief complaint is that of chronic headache.      The patient has had headaches for over 25 years.  This came with one of her pregnancies.  She said that in the last 2 years, her headaches have become much worse.  She has had a general headache for at least 20 years that she has most days.  In November, she had what she described as 6 migraines and so far this month she has had 4.  She had worsening headaches about 10 years ago.  She was given amitriptyline and Imitrex.  She was having up to 4 headaches per week and that did decrease the frequency to 1-2 per month.  She ended up having a followup with another neurologist here, probably sometime after that and she was switched to Zomig which seemed to work better.  She was told to continue amitriptyline.  The patient has had aura before most of her migraines.  She said she has had scintillating scotomata off to the right.  She does not recall it to the left.  Most of the time she has severe right retro-orbital headache but sometimes the left.  She has had a number of inciting factors.  In particular, she has had trouble with glare and fluorescent lights.  She does wear sunglasses in general outside.  She was not aware of the FL-40 type of filters that can be purchased now, but will be speaking with her ophthalmologist about this.  Flashing lights will also bring on a headache.  She has noted headaches with full moon but also with change in barometric pressure.  She says patterns will set off her headache.  She has probably  "had it with poor sleep in general and that has been an ongoing issue with her her entire adult life.  She has had headaches with change in schedule and with stress.  She did not think that smoke and perfume would bring on headaches.  She has generally drank 1 cup of caffeinated coffee per day but can drink decaf.  The patient went through foods and beverages that could lead to headache and does not use any of them.  She notes that she will awaken at least once during the night.  She feels rested much of the time getting up in the morning.  She does not snore herself.  Her  snores and refuses to use his CPAP machine.  She was exercising more, but she has returned to work the last 9 weeks.  She said that her headache can just awaken her in the morning.  She has never had any cough, sneeze or strain effect to her headache, nor with lifting or intercourse as well as bending.  She gets nauseated and vomits with her headaches.  She has suffered from chronic pain and has had a pancreatectomy for chronic pancreatitis.  She did use up to 1500 mg of gabapentin in the evening in 2016.  She said she hated it and just felt \"terrible.\"  She tried Lyrica and had the same experience.  When she did have her pancreatectomy, she ended up having a Vera-en-Y anastomosis.  She has had surgical followup and what sounds like nutritional followup and has received iron infusions.  The patient did have voluminous records which I reviewed with her.  She has had multiple issues including known gastric paresis diagnosed about a year and a half ago, chronic constipation for years, as well as esophagitis and dysphasia.  She has had ERCPs done at least 13 times.  She also has had a Schatzki's ring, which was dilated.  She has not really had significant issues with swallowing since.  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  This has been associated evidently with pancreatitis and liver issues.  She has " "had post-pancreatectomy diabetes.  The patient also has had previous history of low back problems and L5-S1, but it is not a current problem.  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  The patient has had treated hypothyroidism for years.  She has some hearing loss in the right ear many years ago.  It has not gotten worse.  She had vertigo diagnosed then also, but that has not been a current problem.  She did have a cholecystectomy and then had gallstones following it.  Cholecystectomy was done in 1995 and she ended up having stones in 2014.  She does suffer from arthritis of her left knee.  The patient has had a hiatal hernia.  She had a pituitary adenoma resected in 1989.  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  She recovered and has pituitary function.  The patient has had 3 caesarean sections.  She has had a total abdominal hysterectomy in 1999 for endometriosis.  In addition, she has had carpal tunnel release.        ALLERGIES:   She has multiple allergies or intolerance to apples, Depakote, Zithromax, Darvocet, morphine, nalbuphine, Zosyn, Bactrim, cats, corticosteroids, dust mites, grass, prednisone causing insomnia, ragweed, tape, trees and Zofran.  She said Zofran leads to blinding headaches.  The patient has had some intolerance to Compazine, but can take it when she uses Benadryl.  She has listed \"twitching\" from it.        CURRENT MEDICATIONS:      1.  She has used scopolamine skin patch but not in the last 6 weeks.  This has helped her chronic nausea.     2.  She is on lubiprostone for gastric paresis.     3.  Actigall.     4.  Celebrex 100 mg for pain management 4 out of 7 days per week.     5.  Creon.     6.  Insulin.     7.  Zyrtec.   8.  Roxicodone 5 mg IR tablets.   9.  Amitriptyline 20 mg daily.   10.  Compazine 5 mg, which she has not taken for 6 weeks; she takes it with Benadryl but sometimes uses Benadryl, otherwise, up to 2 times per week.     11.  She is on levothyroxine.   "   12.  Colace.     13.  Multivitamin.     14.  Prevacid.     15.  Aspirin.   16.  Has glucagon available, which she has not used.       She said that her pain specialist increased her amitriptyline to 25 mg but it did not help her stomach.  She does not recall having any issues with it.  She worked as a  years ago.  She has worked at the [employer].  She did discuss with me her most recent work.      The patient quit smoking in 1992.  She did note she had blinding headaches when she used red wine and has not drank for years.  The patient reviewed her family history.  Her father is alive at 74, but has had myocardial infarctions.  Her mother is alive at 72 and has had thyroid disease.  Both had migraines.  She has one sister who has had migraines and thyroid problems.  Her 3 children are fine.  One does take gabapentin and tolerates it well.  She has had a number of scans done.  She had these for followup and had them after her pituitary surgery.  Her last scan of her brain appears to be an MRI scan done on 01/19/2005.  There is no evidence of a pituitary cyst.  The pituitary gland was relatively small.  There were no abnormalities noted in the brain or brainstem.  She had an MRA that showed bilateral fetal origin of the posterior cerebral arteries from the internal carotids, a normal variant, and was thought to have a normal angiogram of the head.      The patient reviewed with me her prior pituitary surgery and she said she never had a visual field deficit and no endocrine malfunction from and after the surgery.      Neurologic examination revealed a pleasant woman.  Her blood pressure is 115/74 with a pulse of 92.  Gait, station, cerebellar testing, muscle stretch reflexes, plantar stimulation, strength, cranial nerve examination, superficial and cortical sensory testing are unremarkable.  I could not auscultate cervical or cerebral bruits.  She had a regular cardiac rhythm without gallops or murmurs.       IMPRESSION:  Classic migraine.      The patient has classic migraine and has had chronic daily migraine for years.  I have talked with her about various treatments that could be tried.  She is first going to increase her dose of amitriptyline, possibly up to 50 mg a day.  I went over cardiovascular side effects and dry mouth that could lead to caries.  I did review other medicines that could be tried.  She is going to have neurologic followup with me in the next 8 weeks and on a p.r.n. basis.  I went over the use of the Cefaly.  I went over injections such as Botox that could be tried, but she would first have to try other medicines for prophylaxis.  I described lifestyle changes that may be helpful.  I went over gabapentin that could be used for pain to help avoid analgesic rebound.  I did tell her to try to avoid taking more than 8 Zomig tablets per month if possible.      Sincerely yours,      Gary Canchola MD      I spent 1 hour with the patient.  Over 50% of the time this involved counseling and coordination of care.  A complete review of medical systems was done and a positive review is listed in the report above.         GARY CANCHOLA MD             D: 2017 18:23   T: 2017 14:11   MT: AKA      Name:     MALINI DAVIS   MRN:      [id number]        Account:      [id number]   :      1965           Service Date: 2017      Document: [id number]       Again, thank you for allowing me to participate in the care of your patient.      Sincerely,    Gary Canchola MD    cc:   Justyna Lawson MD   [facility]   [address]   [place] [address]     Vijaya Genao MD   [facility]    [address]   [place] [address]

## 2017-12-20 NOTE — MR AVS SNAPSHOT
After Visit Summary   12/20/2017    Dinora Mcghee    MRN: [id number]           Patient Information     Date Of Birth          1965        Visit Information        Provider Department      12/20/2017 8:00 AM Ruiz Canchola MD [facility] Neurology        Today's Diagnoses     Headache disorder    -  1       Follow-ups after your visit        Follow-up notes from your care team     Return in about 2 months (around 2/28/2018).      Your next 10 appointments already scheduled     Jan 16, 2018  2:00 PM CST   (Arrive by 1:45 PM)   Return [place] with Nestor Phoenix MD   [facility] Solid Organ Transplant ([facility])    [address]  [address]  [facility] [address]   [phone]            Jan 23, 2018  8:00 AM CST   (Arrive by 7:45 AM)   Return Visit with Martinez Arteaga MD   [facility] ([facility])    [address]  4th Floor  [place] [facility] [address]   [phone]            Jan 25, 2018 11:00 AM CST   (Arrive by 10:45 AM)   Return Visit with Maria Elena Abdalla,    [facility] ([facility])    [address]  4th Floor  [place] [facility] [address]   [phone]            Feb 21, 2018  1:30 PM CST   (Arrive by 1:15 PM)   Return Visit with Ruiz Canchola MD   [facility] Neurology ([facility])    [address]  3rd Floor  [facility] [address]   [phone]            Feb 28, 2018 11:00 AM CST   (Arrive by 10:45 AM)   Return Visit with Vijaya Genao MD   [facility] Gastroenterology and IBD Clinic ([facility])    [address]  4th Floor  [facility] [address]   [phone]            Mar 16, 2018  9:00 AM CDT   (Arrive by 8:45 AM)   Return Visit with Jesse Richardson DO   [facility] Rheumatology ([facility])    [address]  [address]  [facility] [address] [phone]              Who to contact     Please call your clinic at [phone] to:    Ask questions about your health    Make or cancel appointments    Discuss your medicines    Learn about your test results    Speak to your doctor   If you have compliments or concerns about an experience at your clinic, or if you wish to file a complaint, please contact [facility] Patient Relations at [phone] or email us at [email or web address]         Additional Information About Your Visit        [email or web address] Information     [employer] gives you secure access to your electronic health record. If you see a primary care provider, you can also send messages to your care team and make appointments. If you have questions, please call your primary care clinic.  If you do not have a primary care provider, please call [phone] and they will assist you.      [employer] is an electronic gateway that provides easy, online access to your medical records. With [employer], you can request a clinic appointment, read your test results, renew a prescription or communicate with your care team.     To access your existing account, please contact your [facility] or call [phone] for assistance.        Care EveryWhere ID     This is your Care EveryWhere ID. This could be used by other organizations to access your [place] medical records  [id number]        Your Vitals Were     Pulse Height                92 1.727 m (5' 8\")           Blood Pressure from Last 3 Encounters:   12/20/17 115/74   11/01/17 114/63   10/20/17 107/70    Weight from Last 3 Encounters:   11/01/17 67.8 kg (149 lb 6.4 oz)   10/20/17 68.4 kg (150 lb 14.4 oz)   08/14/17 67.1 kg (148 lb)              Today, you had the following     No orders found for display         Today's Medication Changes          These changes are accurate " as of: 12/20/17 11:59 PM.  If you have any questions, ask your nurse or doctor.               These medicines have changed or have updated prescriptions.        Dose/Directions    * amitriptyline 10 MG tablet   Commonly known as:  ELAVIL   This may have changed:  Another medication with the same name was added. Make sure you understand how and when to take each.   Used for:  Itching, Post-pancreatectomy diabetes (H), History of pancreatectomy, Pancreatic insufficiency   Changed by:  Mecca Watkins RN        Dose:  20 mg   Take 2 tablets (20 mg) by mouth At Bedtime   Quantity:  60 tablet   Refills:  3       * amitriptyline 10 MG tablet   Commonly known as:  ELAVIL   This may have changed:  You were already taking a medication with the same name, and this prescription was added. Make sure you understand how and when to take each.   Used for:  Headache disorder   Changed by:  Ruiz Canchola MD        Dose:  10 mg   Take 1 tablet (10 mg) by mouth At Bedtime   Quantity:  120 tablet   Refills:  3       insulin aspart 100 UNIT/ML injection   Commonly known as:  NovoLOG PENFILL   This may have changed:  Another medication with the same name was removed. Continue taking this medication, and follow the directions you see here.   Used for:  Post-pancreatectomy diabetes (H)   Changed by:  Mecca Watkins RN        Administer subcutaneously 0.5 unit per 45 grams of carbohydrates.   Quantity:  3 mL   Refills:  3       * Notice:  This list has 2 medication(s) that are the same as other medications prescribed for you. Read the directions carefully, and ask your doctor or other care provider to review them with you.      Stop taking these medicines if you haven't already. Please contact your care team if you have questions.     polyethylene glycol Packet   Commonly known as:  MIRALAX/GLYCOLAX   Stopped by:  Ruiz Canchola MD                Where to get your medicines      These medications were sent to  [id number] - [place], [place] - [address]  [address] [facility] [address]     Phone:  [phone]     amitriptyline 10 MG tablet                Primary Care Provider Office Phone # Fax #    Justyna Lawson [phone]       [facility] [address]  [facility] [address]        Equal Access to Services     [facility] CenterSRINIVAS : Hadii aad ku hadasho Soomaali, waaxda luqadaha, qaybta kaalmada adeegyada, waxay idiin hayaan adeeg kharash la'aan ah. So [facility] [phone].    ATENCIÓN: Si habla español, tiene a muñoz disposición servicios gratuitos de asistencia lingüística. [facility] [phone].    We comply with applicable federal civil rights laws and [place] laws. We do not discriminate on the basis of race, color, national origin, age, disability, sex, sexual orientation, or gender identity.            Thank you!     Thank you for choosing [facility] NEUROLOGY  for your care. Our goal is always to provide you with excellent care. Hearing back from our patients is one way we can continue to improve our services. Please take a few minutes to complete the written survey that you may receive in the mail after your visit with us. Thank you!             Your Updated Medication List - Protect others around you: Learn how to safely use, store and throw away your medicines at www.disposemymeds.org.          This list is accurate as of: 12/20/17 11:59 PM.  Always use your most recent med list.                   Brand Name Dispense Instructions for use Diagnosis    * amitriptyline 10 MG tablet    ELAVIL    60 tablet    Take 2 tablets (20 mg) by mouth At Bedtime    Itching, Post-pancreatectomy diabetes (H), History of pancreatectomy, Pancreatic insufficiency       * amitriptyline 10 MG tablet    ELAVIL    120 tablet    Take 1 tablet (10 mg) by mouth At Bedtime    Headache disorder       amylase-lipase-protease [address]9727-[address] UNITS Cpep per EC capsule    CREON 24    450 capsule    Take 3-4  capsules by mouth with meals and 1-2 with snacks. Maximum 15 capsules per day.    Acquired total absence of pancreas       aspirin 81 MG EC tablet     90 tablet    Take 1 tablet (81 mg) by mouth daily    High platelet count (H)       BD SHARPS  Misc     1 each    1 Container as needed    Post-pancreatectomy diabetes (H)       blood glucose monitoring lancets     1 Box    Use to test blood sugar 6-8 times daily or as directed.    Acute on chronic pancreatitis (H)       * blood glucose monitoring test strip    PAT CONTOUR NEXT    200 strip    Use to test blood sugar 6-8 times daily or as directed.    Acute on chronic pancreatitis (H), Post-pancreatectomy diabetes (H)       * blood glucose monitoring test strip    PAT CONTOUR NEXT    2 Box    Use to test blood sugar 6 times daily or as directed.    Post-pancreatectomy diabetes (H)       cetirizine 10 MG tablet    zyrTEC     Take 10 mg by mouth daily    Elevated liver enzymes       CONTOUR NEXT EZ MONITOR W/DEVICE Kit      1 Device 6 times daily    Itching, Post-pancreatectomy diabetes (H), History of pancreatectomy, Pancreatic insufficiency       diphenhydrAMINE 25 MG capsule    BENADRYL ALLERGY    56 capsule    Take 1 capsule (25 mg) by mouth every 6 hours as needed for itching or allergies    Itching, Post-pancreatectomy diabetes (H), History of pancreatectomy, Pancreatic insufficiency       docusate sodium 100 MG capsule    COLACE    60 capsule    Take 1 capsule (100 mg) by mouth 2 times daily as needed for constipation,    Itching, Post-pancreatectomy diabetes (H), History of pancreatectomy, Pancreatic insufficiency       glucagon 1 MG kit    GLUCAGON EMERGENCY    1 mg    Inject 1 mg into the muscle once for 1 dose    Post-pancreatectomy diabetes (H)       glucose 40 % Gel gel     3 Tube    Take 15-30 g by mouth every 15 minutes as needed for low blood sugar    Acquired total absence of pancreas       Injection Device for insulin Tika    NOVOPEN ECHO     1 each    1 each daily as needed    Post-pancreatectomy diabetes (H)       insulin aspart 100 UNIT/ML injection    NovoLOG PENFILL    3 mL    Administer subcutaneously 0.5 unit per 45 grams of carbohydrates.    Post-pancreatectomy diabetes (H)       insulin glargine 100 UNIT/ML injection    LANTUS    3 mL    Inject 5 units subcutaneously every morning.    Post-pancreatectomy diabetes (H)       insulin pen needle 32G X 4 MM    BD KEN U/F    100 each    Use 6-8 times per day    Acquired total absence of pancreas       LANsoprazole 30 MG CR capsule    PREVACID    30 capsule    Take 1 capsule (30 mg) by mouth daily Take 30-60 minutes before a meal.    Post-pancreatectomy diabetes (H), Pancreatic insufficiency       levothyroxine 125 MCG tablet    SYNTHROID/LEVOTHROID    1 tablet    Take 1 tablet (125 mcg), by mouth daily before breakfast.    Itching, Post-pancreatectomy diabetes (H), History of pancreatectomy, Pancreatic insufficiency       multivitamin CF formula capsule    CHOICEFUL     Take 1 tablet by mouth daily    Itching, Post-pancreatectomy diabetes (H), History of pancreatectomy, Pancreatic insufficiency       order for DME     1 Device    TENS Unit    Shoulder pain       oxyCODONE IR 5 MG tablet    ROXICODONE    150 tablet    Take 1-2 tablets (5-10 mg) by mouth every 4 hours as needed for moderate to severe pain    Acute post-operative pain       prochlorperazine 5 MG tablet    COMPAZINE    90 tablet    Take two 5 mg tablets by mouth every six hours as needed for nausea.    Itching, Post-pancreatectomy diabetes (H), History of pancreatectomy, Pancreatic insufficiency       senna-docusate 8.6-50 MG per tablet    SENOKOT-S;PERICOLACE    100 tablet    Take 1 tablet by mouth 2 times daily as needed for constipation    Itching, Post-pancreatectomy diabetes (H), History of pancreatectomy, Pancreatic insufficiency       TRANSDERM-SCOP (1.5 MG) 72 hr patch   Generic drug:  scopolamine      Place 1 patch onto the  skin every 72 hours    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, Need for influenza vaccination       ursodiol 300 MG capsule    ACTIGALL    60 capsule    Take 1 capsule (300 mg) by mouth 2 times daily    Elevated LFTs       VAGIFEM 10 MCG Tabs vaginal tablet   Generic drug:  estradiol           * Notice:  This list has 4 medication(s) that are the same as other medications prescribed for you. Read the directions carefully, and ask your doctor or other care provider to review them with you.

## 2017-12-20 NOTE — NURSING NOTE
Chief Complaint   Patient presents with     Consult     Consult migraines     Antoinette ESPINO MA

## 2017-12-21 NOTE — PROGRESS NOTES
2017      Vijaya Genao MD   [facility]    [address]   [place] [address]      RE: Dinora Loo   MRN: [id number]   : 1965      Dear Dr. Genao:      Thank you for referring Dinora Loo.  The patient's chief complaint is that of chronic headache.      The patient has had headaches for over 25 years.  This came with one of her pregnancies.  She said that in the last 2 years, her headaches have become much worse.  She has had a general headache for at least 20 years that she has most days.  In November, she had what she described as 6 migraines and so far this month she has had 4.  She had worsening headaches about 10 years ago.  She was given amitriptyline and Imitrex.  She was having up to 4 headaches per week and that did decrease the frequency to 1-2 per month.  She ended up having a followup with another neurologist here, probably sometime after that and she was switched to Zomig which seemed to work better.  She was told to continue amitriptyline.  The patient has had aura before most of her migraines.  She said she has had scintillating scotomata off to the right.  She does not recall it to the left.  Most of the time she has severe right retro-orbital headache but sometimes the left.  She has had a number of inciting factors.  In particular, she has had trouble with glare and fluorescent lights.  She does wear sunglasses in general outside.  She was not aware of the FL-40 type of filters that can be purchased now, but will be speaking with her ophthalmologist about this.  Flashing lights will also bring on a headache.  She has noted headaches with full moon but also with change in barometric pressure.  She says patterns will set off her headache.  She has probably had it with poor sleep in general and that has been an ongoing issue with her her entire adult life.  She has had headaches with change in schedule and with stress.  She did not think that  "smoke and perfume would bring on headaches.  She has generally drank 1 cup of caffeinated coffee per day but can drink decaf.  The patient went through foods and beverages that could lead to headache and does not use any of them.  She notes that she will awaken at least once during the night.  She feels rested much of the time getting up in the morning.  She does not snore herself.  Her  snores and refuses to use his CPAP machine.  She was exercising more, but she has returned to work the last 9 weeks.  She said that her headache can just awaken her in the morning.  She has never had any cough, sneeze or strain effect to her headache, nor with lifting or intercourse as well as bending.  She gets nauseated and vomits with her headaches.  She has suffered from chronic pain and has had a pancreatectomy for chronic pancreatitis.  She did use up to 1500 mg of gabapentin in the evening in 2016.  She said she hated it and just felt \"terrible.\"  She tried Lyrica and had the same experience.  When she did have her pancreatectomy, she ended up having a Vera-en-Y anastomosis.  She has had surgical followup and what sounds like nutritional followup and has received iron infusions.  The patient did have voluminous records which I reviewed with her.  She has had multiple issues including known gastric paresis diagnosed about a year and a half ago, chronic constipation for years, as well as esophagitis and dysphasia.  She has had ERCPs done at least 13 times.  She also has had a Schatzki's ring, which was dilated.  She has not really had significant issues with swallowing since.  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  This has been associated evidently with pancreatitis and liver issues.  She has had post-pancreatectomy diabetes.  The patient also has had previous history of low back problems and L5-S1, but it is not a current problem.  She has had chondromalacia of the patella in " "the left knee.  The patient has had treated hypothyroidism for years.  She has some hearing loss in the right ear many years ago.  It has not gotten worse.  She had vertigo diagnosed then also, but that has not been a current problem.  She did have a cholecystectomy and then had gallstones following it.  Cholecystectomy was done in 1995 and she ended up having stones in 2014.  She does suffer from arthritis of her left knee.  The patient has had a hiatal hernia.  She had a pituitary adenoma resected in 1989.  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  She recovered and has pituitary function.  The patient has had 3 caesarean sections.  She has had a total abdominal hysterectomy in 1999 for endometriosis.  In addition, she has had carpal tunnel release.        ALLERGIES:   She has multiple allergies or intolerance to apples, Depakote, Zithromax, Darvocet, morphine, nalbuphine, Zosyn, Bactrim, cats, corticosteroids, dust mites, grass, prednisone causing insomnia, ragweed, tape, trees and Zofran.  She said Zofran leads to blinding headaches.  The patient has had some intolerance to Compazine, but can take it when she uses Benadryl.  She has listed \"twitching\" from it.        CURRENT MEDICATIONS:      1.  She has used scopolamine skin patch but not in the last 6 weeks.  This has helped her chronic nausea.     2.  She is on lubiprostone for gastric paresis.     3.  Actigall.     4.  Celebrex 100 mg for pain management 4 out of 7 days per week.     5.  Creon.     6.  Insulin.     7.  Zyrtec.   8.  Roxicodone 5 mg IR tablets.   9.  Amitriptyline 20 mg daily.   10.  Compazine 5 mg, which she has not taken for 6 weeks; she takes it with Benadryl but sometimes uses Benadryl, otherwise, up to 2 times per week.     11.  She is on levothyroxine.     12.  Colace.     13.  Multivitamin.     14.  Prevacid.     15.  Aspirin.   16.  Has glucagon available, which she has not used.       She said that her pain specialist increased her " amitriptyline to 25 mg but it did not help her stomach.  She does not recall having any issues with it.  She worked as a  years ago.  She has worked at the [employer].  She did discuss with me her most recent work.      The patient quit smoking in 1992.  She did note she had blinding headaches when she used red wine and has not drank for years.  The patient reviewed her family history.  Her father is alive at 74, but has had myocardial infarctions.  Her mother is alive at 72 and has had thyroid disease.  Both had migraines.  She has one sister who has had migraines and thyroid problems.  Her 3 children are fine.  One does take gabapentin and tolerates it well.  She has had a number of scans done.  She had these for followup and had them after her pituitary surgery.  Her last scan of her brain appears to be an MRI scan done on 01/19/2005.  There is no evidence of a pituitary cyst.  The pituitary gland was relatively small.  There were no abnormalities noted in the brain or brainstem.  She had an MRA that showed bilateral fetal origin of the posterior cerebral arteries from the internal carotids, a normal variant, and was thought to have a normal angiogram of the head.      The patient reviewed with me her prior pituitary surgery and she said she never had a visual field deficit and no endocrine malfunction from and after the surgery.      Neurologic examination revealed a pleasant woman.  Her blood pressure is 115/74 with a pulse of 92.  Gait, station, cerebellar testing, muscle stretch reflexes, plantar stimulation, strength, cranial nerve examination, superficial and cortical sensory testing are unremarkable.  I could not auscultate cervical or cerebral bruits.  She had a regular cardiac rhythm without gallops or murmurs.      IMPRESSION:  Classic migraine.      The patient has classic migraine and has had chronic daily migraine for years.  I have talked with her about various treatments that could be tried.   She is first going to increase her dose of amitriptyline, possibly up to 50 mg a day.  I went over cardiovascular side effects and dry mouth that could lead to caries.  I did review other medicines that could be tried.  She is going to have neurologic followup with me in the next 8 weeks and on a p.r.n. basis.  I went over the use of the Cefaly.  I went over injections such as Botox that could be tried, but she would first have to try other medicines for prophylaxis.  I described lifestyle changes that may be helpful.  I went over gabapentin that could be used for pain to help avoid analgesic rebound.  I did tell her to try to avoid taking more than 8 Zomig tablets per month if possible.      Sincerely yours,      Gary Canchola MD      I spent 1 hour with the patient.  Over 50% of the time this involved counseling and coordination of care.  A complete review of medical systems was done and a positive review is listed in the report above.      cc:   Justyna Lawson MD   [address]   [place] [address]         GARY CANCHOLA MD             D: 2017 18:23   T: 2017 14:11   MT: AKA      Name:     MALINI DAVIS   MRN:      [id number]        Account:      [id number]   :      1965           Service Date: 2017      Document: [id number]

## 2017-12-22 DIAGNOSIS — R10.9 CHRONIC ABDOMINAL PAIN: ICD-10-CM

## 2017-12-22 DIAGNOSIS — G89.29 CHRONIC ABDOMINAL PAIN: ICD-10-CM

## 2017-12-22 RX ORDER — CELECOXIB 100 MG/1
100 CAPSULE ORAL 2 TIMES DAILY
Qty: 60 CAPSULE | Refills: 1 | Status: SHIPPED | OUTPATIENT
Start: 2017-12-22 | End: 2018-07-31

## 2017-12-22 NOTE — TELEPHONE ENCOUNTER
Fax received from: [facility] in [place], [place]  Requesting refill for: Celebrex 100 mg BID  Medication is currently being prescribed by Doctor Chandler GOSS reviewed Patient's chart.  This is a standard refill request with no adjustments made to the pt's dose.  Medication refilled and E-prescribed to pharmacy.  Last seen in clinic on 8/8/17 by Dr. Arteaga, who stated in note to continue medications.   -0- follow up appointment scheduled.  Pt was called and asked to Schedule a follow up appointment.       Rossi Gomez LPN

## 2017-12-26 DIAGNOSIS — K59.09 CHRONIC CONSTIPATION: ICD-10-CM

## 2017-12-26 RX ORDER — LUBIPROSTONE 8 UG/1
CAPSULE ORAL
Qty: 180 CAPSULE | Refills: 3 | Status: SHIPPED | OUTPATIENT
Start: 2017-12-26 | End: 2018-02-21

## 2018-01-10 DIAGNOSIS — K86.89 PANCREATIC INSUFFICIENCY: Primary | ICD-10-CM

## 2018-01-12 DIAGNOSIS — E13.9 POST-PANCREATECTOMY DIABETES (H): ICD-10-CM

## 2018-01-12 DIAGNOSIS — Z90.410 POST-PANCREATECTOMY DIABETES (H): ICD-10-CM

## 2018-01-12 DIAGNOSIS — E89.1 POST-PANCREATECTOMY DIABETES (H): ICD-10-CM

## 2018-01-12 DIAGNOSIS — K86.89 PANCREATIC INSUFFICIENCY: ICD-10-CM

## 2018-01-12 RX ORDER — LANSOPRAZOLE 30 MG/1
30 CAPSULE, DELAYED RELEASE ORAL DAILY
Qty: 30 CAPSULE | Refills: 11 | Status: SHIPPED | OUTPATIENT
Start: 2018-01-12 | End: 2018-07-31

## 2018-01-16 ENCOUNTER — OFFICE VISIT (OUTPATIENT)
Dept: TRANSPLANT | Facility: CLINIC | Age: 53
End: 2018-01-16
Attending: SURGERY
Payer: COMMERCIAL

## 2018-01-16 VITALS
WEIGHT: 149.1 LBS | SYSTOLIC BLOOD PRESSURE: 119 MMHG | TEMPERATURE: 98.1 F | HEART RATE: 96 BPM | BODY MASS INDEX: 22.6 KG/M2 | HEIGHT: 68 IN | RESPIRATION RATE: 14 BRPM | DIASTOLIC BLOOD PRESSURE: 72 MMHG | OXYGEN SATURATION: 97 %

## 2018-01-16 DIAGNOSIS — Z23 NEED FOR INFLUENZA VACCINATION: ICD-10-CM

## 2018-01-16 DIAGNOSIS — K76.9 LIVER LESION: ICD-10-CM

## 2018-01-16 DIAGNOSIS — K86.89 PANCREATIC INSUFFICIENCY: ICD-10-CM

## 2018-01-16 DIAGNOSIS — R76.8 IGG4 SELECTIVELY HIGH IN PLASMA: ICD-10-CM

## 2018-01-16 DIAGNOSIS — Z90.410 HISTORY OF PANCREATECTOMY: ICD-10-CM

## 2018-01-16 DIAGNOSIS — K63.89 BACTERIAL OVERGROWTH SYNDROME: Primary | ICD-10-CM

## 2018-01-16 LAB
ALBUMIN SERPL-MCNC: 3.5 G/DL (ref 3.4–5)
ALP SERPL-CCNC: 113 U/L (ref 40–150)
ALT SERPL W P-5'-P-CCNC: 21 U/L (ref 0–50)
ANION GAP SERPL CALCULATED.3IONS-SCNC: 7 MMOL/L (ref 3–14)
AST SERPL W P-5'-P-CCNC: 14 U/L (ref 0–45)
BASOPHILS # BLD AUTO: 0.1 10E9/L (ref 0–0.2)
BASOPHILS NFR BLD AUTO: 1.6 %
BILIRUB SERPL-MCNC: 0.8 MG/DL (ref 0.2–1.3)
BUN SERPL-MCNC: 13 MG/DL (ref 7–30)
C PEPTIDE SERPL-MCNC: 1.1 NG/ML (ref 0.9–6.9)
C PEPTIDE SERPL-MCNC: 2.3 NG/ML (ref 0.9–6.9)
C PEPTIDE SERPL-MCNC: 4.3 NG/ML (ref 0.9–6.9)
CALCIUM SERPL-MCNC: 9.1 MG/DL (ref 8.5–10.1)
CHLORIDE SERPL-SCNC: 105 MMOL/L (ref 94–109)
CO2 SERPL-SCNC: 29 MMOL/L (ref 20–32)
CREAT SERPL-MCNC: 0.64 MG/DL (ref 0.52–1.04)
DIFFERENTIAL METHOD BLD: NORMAL
EOSINOPHIL # BLD AUTO: 0.1 10E9/L (ref 0–0.7)
EOSINOPHIL NFR BLD AUTO: 2.6 %
ERYTHROCYTE [DISTWIDTH] IN BLOOD BY AUTOMATED COUNT: 14.2 % (ref 10–15)
FERRITIN SERPL-MCNC: 17 NG/ML (ref 8–252)
GFR SERPL CREATININE-BSD FRML MDRD: >90 ML/MIN/1.7M2
GLUCOSE SERPL-MCNC: 134 MG/DL (ref 70–99)
GLUCOSE SERPL-MCNC: 79 MG/DL (ref 70–99)
GLUCOSE SERPL-MCNC: 96 MG/DL (ref 70–99)
GLUCOSE SERPL-MCNC: 97 MG/DL (ref 70–99)
HBA1C MFR BLD: 5.9 % (ref 4.3–6)
HCT VFR BLD AUTO: 40.1 % (ref 35–47)
HGB BLD-MCNC: 13.3 G/DL (ref 11.7–15.7)
IGG SERPL-MCNC: 970 MG/DL (ref 695–1620)
IGG1 SER-MCNC: 410 MG/DL (ref 300–856)
IGG2 SER-MCNC: 350 MG/DL (ref 158–761)
IGG3 SER-MCNC: 79 MG/DL (ref 24–192)
IGG4 SER-MCNC: 73 MG/DL (ref 11–86)
IMM GRANULOCYTES # BLD: 0 10E9/L (ref 0–0.4)
IMM GRANULOCYTES NFR BLD: 0.2 %
INR PPP: 1.05 (ref 0.86–1.14)
IRON SATN MFR SERPL: 20 % (ref 15–46)
IRON SERPL-MCNC: 60 UG/DL (ref 35–180)
LYMPHOCYTES # BLD AUTO: 2.3 10E9/L (ref 0.8–5.3)
LYMPHOCYTES NFR BLD AUTO: 45.6 %
MCH RBC QN AUTO: 31.3 PG (ref 26.5–33)
MCHC RBC AUTO-ENTMCNC: 33.2 G/DL (ref 31.5–36.5)
MCV RBC AUTO: 94 FL (ref 78–100)
MONOCYTES # BLD AUTO: 0.7 10E9/L (ref 0–1.3)
MONOCYTES NFR BLD AUTO: 15 %
NEUTROPHILS # BLD AUTO: 1.7 10E9/L (ref 1.6–8.3)
NEUTROPHILS NFR BLD AUTO: 35 %
NRBC # BLD AUTO: 0 10*3/UL
NRBC BLD AUTO-RTO: 0 /100
PLATELET # BLD AUTO: 372 10E9/L (ref 150–450)
PLATELET # BLD EST: NORMAL 10*3/UL
POTASSIUM SERPL-SCNC: 4 MMOL/L (ref 3.4–5.3)
PREALB SERPL IA-MCNC: 16 MG/DL (ref 15–45)
PROT SERPL-MCNC: 7.1 G/DL (ref 6.8–8.8)
RBC # BLD AUTO: 4.25 10E12/L (ref 3.8–5.2)
SODIUM SERPL-SCNC: 140 MMOL/L (ref 133–144)
TIBC SERPL-MCNC: 294 UG/DL (ref 240–430)
VIT B12 SERPL-MCNC: 932 PG/ML (ref 193–986)
WBC # BLD AUTO: 4.9 10E9/L (ref 4–11)

## 2018-01-16 PROCEDURE — 83540 ASSAY OF IRON: CPT | Performed by: PEDIATRICS

## 2018-01-16 PROCEDURE — 84446 ASSAY OF VITAMIN E: CPT | Performed by: PEDIATRICS

## 2018-01-16 PROCEDURE — 82306 VITAMIN D 25 HYDROXY: CPT | Performed by: PEDIATRICS

## 2018-01-16 PROCEDURE — 83550 IRON BINDING TEST: CPT | Performed by: PEDIATRICS

## 2018-01-16 PROCEDURE — 82784 ASSAY IGA/IGD/IGG/IGM EACH: CPT | Performed by: PEDIATRICS

## 2018-01-16 PROCEDURE — 85610 PROTHROMBIN TIME: CPT | Performed by: PEDIATRICS

## 2018-01-16 PROCEDURE — 82728 ASSAY OF FERRITIN: CPT | Performed by: PEDIATRICS

## 2018-01-16 PROCEDURE — 83036 HEMOGLOBIN GLYCOSYLATED A1C: CPT | Performed by: PEDIATRICS

## 2018-01-16 PROCEDURE — 84630 ASSAY OF ZINC: CPT | Performed by: PEDIATRICS

## 2018-01-16 PROCEDURE — 85025 COMPLETE CBC W/AUTO DIFF WBC: CPT | Performed by: PEDIATRICS

## 2018-01-16 PROCEDURE — 80053 COMPREHEN METABOLIC PANEL: CPT | Performed by: PEDIATRICS

## 2018-01-16 PROCEDURE — 82747 ASSAY OF FOLIC ACID RBC: CPT | Performed by: PEDIATRICS

## 2018-01-16 PROCEDURE — 82607 VITAMIN B-12: CPT | Performed by: PEDIATRICS

## 2018-01-16 PROCEDURE — 84134 ASSAY OF PREALBUMIN: CPT | Performed by: PEDIATRICS

## 2018-01-16 PROCEDURE — 84590 ASSAY OF VITAMIN A: CPT | Performed by: PEDIATRICS

## 2018-01-16 PROCEDURE — 82787 IGG 1 2 3 OR 4 EACH: CPT | Performed by: PEDIATRICS

## 2018-01-16 PROCEDURE — 82947 ASSAY GLUCOSE BLOOD QUANT: CPT | Performed by: PEDIATRICS

## 2018-01-16 PROCEDURE — 84681 ASSAY OF C-PEPTIDE: CPT | Mod: 91 | Performed by: PEDIATRICS

## 2018-01-16 RX ORDER — AZELASTINE 1 MG/ML
1 SPRAY, METERED NASAL 2 TIMES DAILY
COMMUNITY
End: 2019-09-17

## 2018-01-16 RX ORDER — ZOLMITRIPTAN 5 MG/1
5 TABLET, FILM COATED ORAL
COMMUNITY
End: 2019-02-01

## 2018-01-16 RX ORDER — METRONIDAZOLE 250 MG/1
250 TABLET ORAL 3 TIMES DAILY
Qty: 30 TABLET | Refills: 0 | Status: SHIPPED | OUTPATIENT
Start: 2018-01-16 | End: 2018-02-21

## 2018-01-16 ASSESSMENT — PAIN SCALES - GENERAL: PAINLEVEL: NO PAIN (0)

## 2018-01-16 NOTE — MR AVS SNAPSHOT
After Visit Summary   1/16/2018    Dinora Mcghee    MRN: [id number]           Patient Information     Date Of Birth          1965        Visit Information        Provider Department      1/16/2018 2:00 PM Nestor Phoenix MD [facility] Solid Organ Transplant        Today's Diagnoses     Bacterial overgrowth syndrome    -  1    Pancreatic insufficiency        History of pancreatectomy           Follow-ups after your visit        Follow-up notes from your care team     Return in about 1 year (around 1/16/2019).      Your next 10 appointments already scheduled     Jan 23, 2018  8:00 AM CST   (Arrive by 7:45 AM)   Return Visit with Martinez Arteaga MD   [facility] ([facility])    [address]  4th Floor  [facility] [address]   [phone]            Jan 25, 2018 11:00 AM CST   (Arrive by 10:45 AM)   Return Visit with Maria Elena Abdalla,    [facility] ([facility])    [address] [place] [address]  4th Floor  [facility] [address]   [phone]            Feb 21, 2018  1:30 PM CST   (Arrive by 1:15 PM)   Return Visit with Ruiz Canchola MD   [facility] Neurology ([facility])    [address]  3rd Floor  [facility] [address]   [phone]            Feb 28, 2018 11:00 AM CST   (Arrive by 10:45 AM)   Return Visit with Vijaya Genao MD   [facility] ([facility])    [address] [place] [address]  4th Floor  [facility] [address]   [phone]            Mar 16, 2018  9:00 AM CDT   (Arrive by 8:45 AM)   Return Visit with Jesse Richardson DO   [facility] Rheumatology ([facility])    [address] [place] [address]  [address]  [facility] [address] [phone]              Who to contact     If you have questions or need follow up  "information about today's clinic visit or your schedule please contact [facility] directly at [phone].  Normal or non-critical lab and imaging results will be communicated to you by Tilerahart, letter or phone within 4 business days after the clinic has received the results. If you do not hear from us within 7 days, please contact the clinic through iZocat or phone. If you have a critical or abnormal lab result, we will notify you by phone as soon as possible.  Submit refill requests through PlaceFull or call your pharmacy and they will forward the refill request to us. Please allow 3 business days for your refill to be completed.          Additional Information About Your Visit        [employer] Information     PlaceFull gives you secure access to your electronic health record. If you see a primary care provider, you can also send messages to your care team and make appointments. If you have questions, please call your primary care clinic.  If you do not have a primary care provider, please call [phone] and they will assist you.        Care EveryWhere ID     This is your Care EveryWhere ID. This could be used by other organizations to access your [place] medical records  [id number]        Your Vitals Were     Pulse Temperature Respirations Height Pulse Oximetry BMI (Body Mass Index)    96 98.1  F (36.7  C) (Oral) 14 1.727 m (5' 8\") 97% 22.67 kg/m2       Blood Pressure from Last 3 Encounters:   01/16/18 119/72   12/20/17 115/74   11/01/17 114/63    Weight from Last 3 Encounters:   01/16/18 67.6 kg (149 lb 1.6 oz)   11/01/17 67.8 kg (149 lb 6.4 oz)   10/20/17 68.4 kg (150 lb 14.4 oz)              Today, you had the following     No orders found for display         Today's Medication Changes          These changes are accurate as of: 1/16/18 11:59 PM.  If you have any questions, ask your nurse or doctor.               Start taking these medicines.        Dose/Directions    metroNIDAZOLE 250 MG " tablet   Commonly known as:  FLAGYL   Used for:  Bacterial overgrowth syndrome   Started by:  Nestor Phoenix MD        Dose:  250 mg   Take 1 tablet (250 mg) by mouth 3 times daily   Quantity:  30 tablet   Refills:  0         These medicines have changed or have updated prescriptions.        Dose/Directions    amitriptyline 10 MG tablet   Commonly known as:  ELAVIL   This may have changed:  how much to take   Used for:  Headache disorder        Dose:  10 mg   Take 1 tablet (10 mg) by mouth At Bedtime   Quantity:  120 tablet   Refills:  3       levothyroxine 125 MCG tablet   Commonly known as:  SYNTHROID/LEVOTHROID   This may have changed:    - how much to take  - how to take this  - additional instructions   Used for:  Itching, Post-pancreatectomy diabetes (H), History of pancreatectomy, Pancreatic insufficiency        Take 1 tablet (125 mcg), by mouth daily before breakfast.   Quantity:  1 tablet   Refills:  0         Stop taking these medicines if you haven't already. Please contact your care team if you have questions.     oxyCODONE IR 5 MG tablet   Commonly known as:  ROXICODONE   Stopped by:  Nestor Phoenix MD           TRANSDERM-SCOP (1.5 MG) 72 hr patch   Generic drug:  scopolamine   Stopped by:  Nestor Phoenix MD           ursodiol 300 MG capsule   Commonly known as:  ACTIGALL   Stopped by:  Nestor Phoenix MD                Where to get your medicines      These medications were sent to [id number] - [place], [place] - [address]  [address] [facility] [address]     Phone:  [phone]     metroNIDAZOLE 250 MG tablet                Primary Care Provider Office Phone # Fax #    Justyna Lawson [phone]       [facility] [address]  [facility] [address]        Equal Access to Services     [facility] CenterSRINIVAS AH: Hadii monty montiel Sopooja, waaxda luqadaha, qaybta kaalvianey hopkins, ivan wallace ah. So [facility]  [phone].    ATENCIÓN: Si freda champagne, tiene a muñoz disposición servicios gratuitos de asistencia lingüística. Howard yanez [phone].    We comply with applicable federal civil rights laws and [place] laws. We do not discriminate on the basis of race, color, national origin, age, disability, sex, sexual orientation, or gender identity.            Thank you!     Thank you for choosing [facility]  for your care. Our goal is always to provide you with excellent care. Hearing back from our patients is one way we can continue to improve our services. Please take a few minutes to complete the written survey that you may receive in the mail after your visit with us. Thank you!             Your Updated Medication List - Protect others around you: Learn how to safely use, store and throw away your medicines at www.disposemymeds.org.          This list is accurate as of: 1/16/18 11:59 PM.  Always use your most recent med list.                   Brand Name Dispense Instructions for use Diagnosis    acetaminophen 500 MG Caps      Take 2 mg by mouth 2 times daily        amitriptyline 10 MG tablet    ELAVIL    120 tablet    Take 1 tablet (10 mg) by mouth At Bedtime    Headache disorder       amylase-lipase-protease 85990-71042 UNITS Cpep per EC capsule    CREON 24    450 capsule    Take 3-4 capsules by mouth with meals and 1-2 with snacks. Maximum 15 capsules per day.    Acquired total absence of pancreas       aspirin 81 MG EC tablet     90 tablet    Take 1 tablet (81 mg) by mouth daily    High platelet count (H)       azelastine 0.1 % spray    ASTELIN     Spray 1 spray into both nostrils 2 times daily        BD SHARPS  Misc     1 each    1 Container as needed    Post-pancreatectomy diabetes (H)       blood glucose monitoring lancets     1 Box    Use to test blood sugar 6-8 times daily or as directed.    Acute on chronic pancreatitis (H)       * blood glucose monitoring test strip    PAT  CONTOUR NEXT    200 strip    Use to test blood sugar 6-8 times daily or as directed.    Acute on chronic pancreatitis (H), Post-pancreatectomy diabetes (H)       * blood glucose monitoring test strip    PAT CONTOUR NEXT    2 Box    Use to test blood sugar 6 times daily or as directed.    Post-pancreatectomy diabetes (H)       celecoxib 100 MG capsule    celeBREX    60 capsule    Take 1 capsule (100 mg) by mouth 2 times daily    Chronic abdominal pain       cetirizine 10 MG tablet    zyrTEC     Take 10 mg by mouth daily    Elevated liver enzymes       CONTOUR NEXT EZ MONITOR W/DEVICE Kit      1 Device 6 times daily    Itching, Post-pancreatectomy diabetes (H), History of pancreatectomy, Pancreatic insufficiency       diphenhydrAMINE 25 MG capsule    BENADRYL ALLERGY    56 capsule    Take 1 capsule (25 mg) by mouth every 6 hours as needed for itching or allergies    Itching, Post-pancreatectomy diabetes (H), History of pancreatectomy, Pancreatic insufficiency       docusate sodium 100 MG capsule    COLACE    60 capsule    Take 1 capsule (100 mg) by mouth 2 times daily as needed for constipation,    Itching, Post-pancreatectomy diabetes (H), History of pancreatectomy, Pancreatic insufficiency       glucagon 1 MG kit    GLUCAGON EMERGENCY    1 mg    Inject 1 mg into the muscle once for 1 dose    Post-pancreatectomy diabetes (H)       glucose 40 % Gel gel     3 Tube    Take 15-30 g by mouth every 15 minutes as needed for low blood sugar    Acquired total absence of pancreas       Injection Device for insulin Tika    NOVOPEN ECHO    1 each    1 each daily as needed    Post-pancreatectomy diabetes (H)       insulin aspart 100 UNIT/ML injection    NovoLOG PENFILL    3 mL    Administer subcutaneously 0.5 unit per 45 grams of carbohydrates.    Post-pancreatectomy diabetes (H)       insulin pen needle 32G X 4 MM    BD KEN U/F    100 each    Use 6-8 times per day    Acquired total absence of pancreas       LANsoprazole 30 MG  CR capsule    PREVACID    30 capsule    Take 1 capsule (30 mg) by mouth daily Take 30-60 minutes before a meal.    Post-pancreatectomy diabetes (H), Pancreatic insufficiency       levothyroxine 125 MCG tablet    SYNTHROID/LEVOTHROID    1 tablet    Take 1 tablet (125 mcg), by mouth daily before breakfast.    Itching, Post-pancreatectomy diabetes (H), History of pancreatectomy, Pancreatic insufficiency       Lubiprostone 8 MCG Caps capsule     180 capsule    Take 3 capsules (24mcg ) twice a day or as directed.    Chronic constipation       metroNIDAZOLE 250 MG tablet    FLAGYL    30 tablet    Take 1 tablet (250 mg) by mouth 3 times daily    Bacterial overgrowth syndrome       multivitamin CF formula capsule    CHOICEFUL     Take 1 tablet by mouth daily    Itching, Post-pancreatectomy diabetes (H), History of pancreatectomy, Pancreatic insufficiency       order for DME     1 Device    TENS Unit    Shoulder pain       prochlorperazine 5 MG tablet    COMPAZINE    90 tablet    Take two 5 mg tablets by mouth every six hours as needed for nausea.    Itching, Post-pancreatectomy diabetes (H), History of pancreatectomy, Pancreatic insufficiency       senna-docusate 8.6-50 MG per tablet    SENOKOT-S;PERICOLACE    100 tablet    Take 1 tablet by mouth 2 times daily as needed for constipation    Itching, Post-pancreatectomy diabetes (H), History of pancreatectomy, Pancreatic insufficiency       VAGIFEM 10 MCG Tabs vaginal tablet   Generic drug:  estradiol           ZOMIG 5 MG tablet   Generic drug:  ZOLMitriptan      Take 5 mg by mouth at onset of headache for migraine        * Notice:  This list has 2 medication(s) that are the same as other medications prescribed for you. Read the directions carefully, and ask your doctor or other care provider to review them with you.

## 2018-01-16 NOTE — PROGRESS NOTES
Chronic Pancreatitis Group Progress Note    I had the pleasure of seeing Ms. Dinora Mcghee today. 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    Interval events since last visit with me:   ER visits = 1, reasons: in June for abdominal pain though to be muscular.  Inpatient admissions = 0, reasons n/a  The patient reports their current symptoms to be:   GI function:   Nausea:   [x]  none    []  rare    []  often    []  daily  Comment: not for about a month  Vomiting: [x]  none    []  rare    []  often    []  daily   Comment:   Last BM: Today.  Stools are soft, frequency : about daily, sometimes 5 x per day.   On amitiza 24 QHS, 16 QAM.  Senna / Colace 1 BID.  Appetite: good.  Weight stable.  Oral food intake: 3 per day, plus evening snack.  Pancreatic exocrine insufficiency:   Takes (Creon 24), 3-4 with meals, 2-3 with snacks.  Greasy stools: []  none   [x]  rarely    []  several times/wk   []  daily      Comment: with dietary indiscretion.   Has flatulence.   Diabetes management:   Long acting insulin: Lantus--0 units/day  Short acting insulin: Novolog--1 units every 7-10 days on average.  Blood sugars typically range from 70 to 150 (rarely).  Senses lows with emotional lability.  Usually in afternoon.  Lab Results   Component Value Date    A1C 5.9 01/16/2018    A1C 5.2 06/20/2017    A1C 5.6 04/04/2017    Pain management:   Pain rating (0=no pain, 10=unbearable): 0 pancreas pain.  Has some shoulder pain, some migraine issues.  Ran a 5 K.  Naps regularly, with tired in early evening.  Long acting agent:  0  Short acting agent: tylenol and celebrex (for shoulder) no opioids.   Daily 'Uptime': most of the day, takes naps.     Walking: distance about 1-3 miles, most every day per week.  Takes weight lifting class.     Prescription Medications as of 1/16/2018             ZOLMitriptan (ZOMIG) 5 MG tablet Take 5 mg by mouth at onset of headache for migraine    azelastine (ASTELIN) 0.1 %  spray Spray 1 spray into both nostrils 2 times daily    acetaminophen 500 MG CAPS Take 2 mg by mouth 2 times daily    amitriptyline (ELAVIL) 10 MG tablet Take 1 tablet (10 mg) by mouth At Bedtime    amylase-lipase-protease (CREON 24) 47033 UNITS CPEP per EC capsule Take 3-4 capsules by mouth with meals and 1-2 with snacks. Maximum 15 capsules per day.    levothyroxine (SYNTHROID/LEVOTHROID) 125 MCG tablet Take 1 tablet (125 mcg), by mouth daily before breakfast.    aspirin 81 MG EC tablet Take 1 tablet (81 mg) by mouth daily    LANsoprazole (PREVACID) 30 MG CR capsule Take 1 capsule (30 mg) by mouth daily Take 30-60 minutes before a meal.    Lubiprostone 8 MCG CAPS capsule Take 3 capsules (24mcg ) twice a day or as directed.    celecoxib (CELEBREX) 100 MG capsule Take 1 capsule (100 mg) by mouth 2 times daily    estradiol (VAGIFEM) 10 MCG TABS vaginal tablet     blood glucose monitoring (PAT CONTOUR NEXT) test strip Use to test blood sugar 6 times daily or as directed.    scopolamine (TRANSDERM-SCOP, 1.5 MG,) 72 hr patch Place 1 patch onto the skin every 72 hours    ursodiol (ACTIGALL) 300 MG capsule Take 1 capsule (300 mg) by mouth 2 times daily    order for DME TENS Unit    blood glucose monitoring (PAT CONTOUR NEXT) test strip Use to test blood sugar 6-8 times daily or as directed.    insulin pen needle (BD KEN U/F) 32G X 4 MM Use 6-8 times per day    cetirizine (ZYRTEC) 10 MG tablet Take 10 mg by mouth daily    insulin aspart (NOVOLOG PENFILL) 100 UNIT/ML injection Administer subcutaneously 0.5 unit per 45 grams of carbohydrates.    senna-docusate (SENOKOT-S;PERICOLACE) 8.6-50 MG per tablet Take 1 tablet by mouth 2 times daily as needed for constipation    prochlorperazine (COMPAZINE) 5 MG tablet Take two 5 mg tablets by mouth every six hours as needed for nausea.    diphenhydrAMINE (BENADRYL ALLERGY) 25 MG capsule Take 1 capsule (25 mg) by mouth every 6 hours as needed for itching or allergies    docusate  sodium (COLACE) 100 MG capsule Take 1 capsule (100 mg) by mouth 2 times daily as needed for constipation,    multivitamin CF formula (CHOICEFUL) capsule Take 1 tablet by mouth daily    Blood Glucose Monitoring Suppl (CONTOUR NEXT EZ MONITOR) W/DEVICE KIT 1 Device 6 times daily    glucagon (GLUCAGON EMERGENCY) 1 MG kit Inject 1 mg into the muscle once for 1 dose    Injection Device for insulin (NOVOPEN ECHO) MARCO 1 each daily as needed    glucose 40 % GEL gel Take 15-30 g by mouth every 15 minutes as needed for low blood sugar    Sharps Container ([employer] SHARPS ) MISC 1 Container as needed    blood glucose monitoring (PAT MICROLET) lancets Use to test blood sugar 6-8 times daily or as directed.        Physical exam:   General Appearance: in no apparent distress.   Temp:  [98.1  F (36.7  C)] 98.1  F (36.7  C)  Pulse:  [96] 96  Resp:  [14] 14  BP: (119)/(72) 119/72  SpO2:  [97 %] 97 %   Skin: Normal, no rashes or jaundice  Heart: Regular rhythm.  Lungs: no audible wheezes or increased work of breathing.  Abdomen: The abdomen is rounded, and the wound is well healed, without hernia. The abdomen is non-tender, epigastric.    Edema: absent.    Chronic Pancreatitis Latest Ref Rng & Units 1/16/2018 1/16/2018 1/16/2018 1/16/2018 1/16/2018   Weight - - - - - 67.631 kg   AMYLASE 30 - 110 U/L - - - - -   LIPASE 73 - 393 U/L - - - - -   HEMOGLOBIN A1C 4.3 - 6.0 % 5.9 - - - -   C PEPTIDE 0.9 - 6.9 ng/mL 1.1 - 4.3 2.3 -   GLUCOSE 70 - 99 mg/dL 96 97 134(H) 79 -   HGB 11.7 - 15.7 g/dL 13.3 - - - -    - 450 10e9/L 372 - - - -   ALBUMIN 3.4 - 5.0 g/dL 3.5 - - - -   PREALBUMIN 15 - 45 mg/dL - - - - -       Assessment and Plan: She is doing well s/p TP-IAT.  Interval progress has been good.  Pancreatic exocrine insufficiency: seems very well controlled.  Probable bacterial overgrowth: will treat with flagyl 250 TID x 10 days.   Malnutrition: not present.  Diabetes mellitus: well controlled.  Excellent C peptide response.  A1C in range.  Not at risk for diabetic complications unless islet function decreases.  Abdominal pain management: not on narcotics.  Fatigue/Migraine: significant contributer to decreased sense of well being and sleep disturbances.  While she is working half time, she is totally maxed out as a result of participating in her recommended activities (daily exercise and walking plan).  I do not think that she will do well increasing her work hours at this time.  Further recommendations I would defer to her pain management and medical team.    Hypothyroidism: recent increase in synthroid, further management by PCP.  Implanted venous access port. OK to schedule removal.   Hepatic pseudotumor: alkaline phos and transaminases are totally normal, so we will stop ursodiol and observe.  Will get repeat chem 20 in 1-2 months.   Followup: I will see her again in clinic in 1 year    Total time: 25 min, Counselling Time: 20 min.          Nestor Phoenix MD  Department of Surgery

## 2018-01-16 NOTE — LETTER
1/16/2018       RE: Dinora Mcghee  [address]  [facility] [address]       Chronic Pancreatitis Group Progress Note    I had the pleasure of seeing Ms. Dinora Mcghee today. 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    Interval events since last visit with me:   ER visits = 1, reasons: in June for abdominal pain though to be muscular.  Inpatient admissions = 0, reasons n/a  The patient reports their current symptoms to be:   GI function:   Nausea:   [x]  none    []  rare    []  often    []  daily  Comment: not for about a month  Vomiting: [x]  none    []  rare    []  often    []  daily   Comment:   Last BM: Today.  Stools are soft, frequency : about daily, sometimes 5 x per day.   On amitiza 24 QHS, 16 QAM.  Senna / Colace 1 BID.  Appetite: good.  Weight stable.  Oral food intake: 3 per day, plus evening snack.  Pancreatic exocrine insufficiency:   Takes (Creon 24), 3-4 with meals, 2-3 with snacks.  Greasy stools: []  none   [x]  rarely    []  several times/wk   []  daily      Comment: with dietary indiscretion.   Has flatulence.   Diabetes management:   Long acting insulin: Lantus--0 units/day  Short acting insulin: Novolog--1 units every 7-10 days on average.  Blood sugars typically range from 70 to 150 (rarely).  Senses lows with emotional lability.  Usually in afternoon.  Lab Results   Component Value Date    A1C 5.9 01/16/2018    A1C 5.2 06/20/2017    A1C 5.6 04/04/2017    Pain management:   Pain rating (0=no pain, 10=unbearable): 0 pancreas pain.  Has some shoulder pain, some migraine issues.  Ran a 5 K.  Naps regularly, with tired in early evening.  Long acting agent:  0  Short acting agent: tylenol and celebrex (for shoulder) no opioids.   Daily 'Uptime': most of the day, takes naps.     Walking: distance about 1-3 miles, most every day per week.  Takes weight lifting class.     Prescription Medications as of 1/16/2018             ZOLMitriptan (ZOMIG) 5 MG tablet  Take 5 mg by mouth at onset of headache for migraine    azelastine (ASTELIN) 0.1 % spray Spray 1 spray into both nostrils 2 times daily    acetaminophen 500 MG CAPS Take 2 mg by mouth 2 times daily    amitriptyline (ELAVIL) 10 MG tablet Take 1 tablet (10 mg) by mouth At Bedtime    amylase-lipase-protease (CREON 24) 35958 UNITS CPEP per EC capsule Take 3-4 capsules by mouth with meals and 1-2 with snacks. Maximum 15 capsules per day.    levothyroxine (SYNTHROID/LEVOTHROID) 125 MCG tablet Take 1 tablet (125 mcg), by mouth daily before breakfast.    aspirin 81 MG EC tablet Take 1 tablet (81 mg) by mouth daily    LANsoprazole (PREVACID) 30 MG CR capsule Take 1 capsule (30 mg) by mouth daily Take 30-60 minutes before a meal.    Lubiprostone 8 MCG CAPS capsule Take 3 capsules (24mcg ) twice a day or as directed.    celecoxib (CELEBREX) 100 MG capsule Take 1 capsule (100 mg) by mouth 2 times daily    estradiol (VAGIFEM) 10 MCG TABS vaginal tablet     blood glucose monitoring (PAT CONTOUR NEXT) test strip Use to test blood sugar 6 times daily or as directed.    scopolamine (TRANSDERM-SCOP, 1.5 MG,) 72 hr patch Place 1 patch onto the skin every 72 hours    ursodiol (ACTIGALL) 300 MG capsule Take 1 capsule (300 mg) by mouth 2 times daily    order for DME TENS Unit    blood glucose monitoring (PAT CONTOUR NEXT) test strip Use to test blood sugar 6-8 times daily or as directed.    insulin pen needle (BD KEN U/F) 32G X 4 MM Use 6-8 times per day    cetirizine (ZYRTEC) 10 MG tablet Take 10 mg by mouth daily    insulin aspart (NOVOLOG PENFILL) 100 UNIT/ML injection Administer subcutaneously 0.5 unit per 45 grams of carbohydrates.    senna-docusate (SENOKOT-S;PERICOLACE) 8.6-50 MG per tablet Take 1 tablet by mouth 2 times daily as needed for constipation    prochlorperazine (COMPAZINE) 5 MG tablet Take two 5 mg tablets by mouth every six hours as needed for nausea.    diphenhydrAMINE (BENADRYL ALLERGY) 25 MG capsule Take 1  capsule (25 mg) by mouth every 6 hours as needed for itching or allergies    docusate sodium (COLACE) 100 MG capsule Take 1 capsule (100 mg) by mouth 2 times daily as needed for constipation,    multivitamin CF formula (CHOICEFUL) capsule Take 1 tablet by mouth daily    Blood Glucose Monitoring Suppl (CONTOUR NEXT EZ MONITOR) W/DEVICE KIT 1 Device 6 times daily    glucagon (GLUCAGON EMERGENCY) 1 MG kit Inject 1 mg into the muscle once for 1 dose    Injection Device for insulin (NOVOPEN ECHO) MARCO 1 each daily as needed    glucose 40 % GEL gel Take 15-30 g by mouth every 15 minutes as needed for low blood sugar    Sharps Container (Biomode - Biomolecular Determination SHARPS ) MISC 1 Container as needed    blood glucose monitoring (PAT MICROLET) lancets Use to test blood sugar 6-8 times daily or as directed.        Physical exam:   General Appearance: in no apparent distress.   Temp:  [98.1  F (36.7  C)] 98.1  F (36.7  C)  Pulse:  [96] 96  Resp:  [14] 14  BP: (119)/(72) 119/72  SpO2:  [97 %] 97 %   Skin: Normal, no rashes or jaundice  Heart: Regular rhythm.  Lungs: no audible wheezes or increased work of breathing.  Abdomen: The abdomen is rounded, and the wound is well healed, without hernia. The abdomen is non-tender, epigastric.    Edema: absent.    Chronic Pancreatitis Latest Ref Rng & Units 1/16/2018 1/16/2018 1/16/2018 1/16/2018 1/16/2018   Weight - - - - - 67.631 kg   AMYLASE 30 - 110 U/L - - - - -   LIPASE 73 - 393 U/L - - - - -   HEMOGLOBIN A1C 4.3 - 6.0 % 5.9 - - - -   C PEPTIDE 0.9 - 6.9 ng/mL 1.1 - 4.3 2.3 -   GLUCOSE 70 - 99 mg/dL 96 97 134(H) 79 -   HGB 11.7 - 15.7 g/dL 13.3 - - - -    - 450 10e9/L 372 - - - -   ALBUMIN 3.4 - 5.0 g/dL 3.5 - - - -   PREALBUMIN 15 - 45 mg/dL - - - - -       Assessment and Plan: She is doing well s/p TP-IAT.  Interval progress has been good.  Pancreatic exocrine insufficiency: seems very well controlled.  Probable bacterial overgrowth: will treat with flagyl 250 TID x 10 days.    Malnutrition: not present.  Diabetes mellitus: well controlled.  Excellent C peptide response. A1C in range.  Not at risk for diabetic complications unless islet function decreases.  Abdominal pain management: not on narcotics.  Fatigue/Migraine: significant contributer to decreased sense of well being and sleep disturbances.  While she is working half time, she is totally maxed out as a result of participating in her recommended activities (daily exercise and walking plan).  I do not think that she will do well increasing her work hours at this time.  Further recommendations I would defer to her pain management and medical team.    Hypothyroidism: recent increase in synthroid, further management by PCP.  Implanted venous access port. OK to schedule removal.   Hepatic pseudotumor: alkaline phos and transaminases are totally normal, so we will stop ursodiol and observe.  Will get repeat chem 20 in 1-2 months.   Followup: I will see her again in clinic in 1 year    Total time: 25 min, Counselling Time: 20 min.          Nestor Phoenix MD  Department of Surgery      =

## 2018-01-17 LAB
FOLATE RBC-MCNC: 665 NG/ML
HCT VFR BLD CALC: 40.1 %
ZINC SERPL-MCNC: 55 UG/DL (ref 60–120)

## 2018-01-18 LAB
A-TOCOPHEROL VIT E SERPL-MCNC: 12.2 MG/L (ref 5.5–18)
ANNOTATION COMMENT IMP: NORMAL
BETA+GAMMA TOCOPHEROL SERPL-MCNC: 0.4 MG/L (ref 0–6)
RETINYL PALMITATE SERPL-MCNC: <0.02 MG/L (ref 0–0.1)
VIT A SERPL-MCNC: 0.35 MG/L (ref 0.3–1.2)

## 2018-01-18 ASSESSMENT — ACTIVITIES OF DAILY LIVING (ADL): I_KEEP_THINKING_ABOUT_HOW_BADLY_I_WANT_THE_PAIN_TO_STOP: 1 = TO A SLIGHT DEGREE

## 2018-01-19 LAB
DEPRECATED CALCIDIOL+CALCIFEROL SERPL-MC: <47 UG/L (ref 20–75)
VITAMIN D2 SERPL-MCNC: <5 UG/L
VITAMIN D3 SERPL-MCNC: 42 UG/L

## 2018-01-25 ENCOUNTER — MYC MEDICAL ADVICE (OUTPATIENT)
Dept: TRANSPLANT | Facility: CLINIC | Age: 53
End: 2018-01-25

## 2018-01-25 ENCOUNTER — OFFICE VISIT (OUTPATIENT)
Dept: ANESTHESIOLOGY | Facility: CLINIC | Age: 53
End: 2018-01-25
Payer: COMMERCIAL

## 2018-01-25 DIAGNOSIS — F43.89 ADJUSTMENT REACTION TO CHRONIC STRESS: Primary | ICD-10-CM

## 2018-01-25 NOTE — MR AVS SNAPSHOT
After Visit Summary   1/25/2018    Dinora Mcghee    MRN: [id number]           Patient Information     Date Of Birth          1965        Visit Information        Provider Department      1/25/2018 11:00 AM Maria Elena Abdalla,  [facility]        Today's Diagnoses     Adjustment reaction to chronic stress    -  1       Follow-ups after your visit        Follow-up notes from your care team     Return in about 2 weeks (around 2/8/2018).      Your next 10 appointments already scheduled     Feb 05, 2018  9:00 AM CST   (Arrive by 8:45 AM)   Return Visit with Martinez Arteaga MD   [facility] ([facility])    [address] [place] [address]  4th Floor  [facility] [id number] [phone]            Feb 21, 2018  1:30 PM CST   (Arrive by 1:15 PM)   Return Visit with Ruiz Canchola MD   [facility] Neurology ([facility])    [address] [place] [address]  3rd Floor  [facility] [id number] [phone]            Feb 28, 2018 11:00 AM CST   (Arrive by 10:45 AM)   Return Visit with Vijaya Genao MD   [facility] ([facility])    [address] [place] [address]  4th Floor  [facility] [id number] [phone]            Mar 16, 2018  9:00 AM CDT   (Arrive by 8:45 AM)   Return Visit with Jesse Richardson DO   [facility] Rheumatology ([facility])    [address] [place] [address]  [address]  [facility] [id number] [phone]              Who to contact     Please call your clinic at [phone] to:    Ask questions about your health    Make or cancel appointments    Discuss your medicines    Learn about your test results    Speak to your doctor   If you have compliments or concerns about an experience at your clinic, or if you wish to file a complaint, please contact [facility] Patient Relations at [phone] or email us at [email or web address]         Additional Information About Your Visit        [employer] Information     [employer] gives you secure access to your electronic health record. If you see a primary care provider, you can also send messages to your care team and make appointments. If you have questions, please call your primary care clinic.  If you do not have a primary care provider, please call [phone] and they will assist you.      [employer] is an electronic gateway that provides easy, online access to your medical records. With [employer], you can request a clinic appointment, read your test results, renew a prescription or communicate with your care team.     To access your existing account, please contact your [facility] or call [phone] for assistance.        Care EveryWhere ID     This is your Care EveryWhere ID. This could be used by other organizations to access your [place] medical records  [id number]         Blood Pressure from Last 3 Encounters:   01/16/18 119/72   12/20/17 115/74   11/01/17 114/63    Weight from Last 3 Encounters:   01/16/18 67.6 kg (149 lb 1.6 oz)   11/01/17 67.8 kg (149 lb 6.4 oz)   10/20/17 68.4 kg (150 lb 14.4 oz)              Today, you had the following     No orders found for display         Today's Medication Changes          These changes are accurate as of 1/25/18  1:53 PM.  If you have any questions, ask your nurse or doctor.               These medicines have changed or have updated prescriptions.        Dose/Directions    amitriptyline 10 MG tablet   Commonly known as:  ELAVIL   This may have changed:  how much to take   Used for:  Headache disorder        Dose:  10 mg   Take 1 tablet (10 mg) by mouth At Bedtime   Quantity:  120 tablet   Refills:  3       levothyroxine 125 MCG tablet   Commonly known as:  SYNTHROID/LEVOTHROID   This may have changed:    - how much to take  -  how to take this  - additional instructions   Used for:  Itching, Post-pancreatectomy diabetes (H), History of pancreatectomy, Pancreatic insufficiency        Take 1 tablet (125 mcg), by mouth daily before breakfast.   Quantity:  1 tablet   Refills:  0                Primary Care Provider Office Phone # Fax #    Justyna Lawson [phone]       [facility] [place] [address] [address]  [place] [facility] [address]        Equal Access to Services     [facility] : Hadii aad ku hadasho Soomaali, waaxda luqadaha, qaybta kaalmada adeegyada, waxay idiin hayaan adeeg kharash laaaron . So [facility] [phone].    ATENCIÓN: Si ismala espcyrus, tiene a muñoz disposición servicios gratuitos de asistencia lingüística. LlSelect [facility] - [facility]ull [phone].    We comply with applicable federal civil rights laws and [place] laws. We do not discriminate on the basis of race, color, national origin, age, disability, sex, sexual orientation, or gender identity.            Thank you!     Thank you for choosing [facility] [[email or web address]] [facility]  for your care. Our goal is always to provide you with excellent care. Hearing back from our patients is one way we can continue to improve our services. Please take a few minutes to complete the written survey that you may receive in the mail after your visit with us. Thank you!             Your Updated Medication List - Protect others around you: Learn how to safely use, store and throw away your medicines at www.disposemymeds.org.          This list is accurate as of 1/25/18  1:53 PM.  Always use your most recent med list.                   Brand Name Dispense Instructions for use Diagnosis    acetaminophen 500 MG Caps      Take 2 mg by mouth 2 times daily        amitriptyline 10 MG tablet    ELAVIL    120 tablet    Take 1 tablet (10 mg) by mouth At Bedtime    Headache disorder       amylase-lipase-protease 70410-14721 UNITS Cpep per EC capsule    CREON 24    450 capsule    Take 3-4  capsules by mouth with meals and 1-2 with snacks. Maximum 15 capsules per day.    Acquired total absence of pancreas       aspirin 81 MG EC tablet     90 tablet    Take 1 tablet (81 mg) by mouth daily    High platelet count (H)       azelastine 0.1 % spray    ASTELIN     Spray 1 spray into both nostrils 2 times daily        BD SHARPS  Misc     1 each    1 Container as needed    Post-pancreatectomy diabetes (H)       blood glucose monitoring lancets     1 Box    Use to test blood sugar 6-8 times daily or as directed.    Acute on chronic pancreatitis (H)       * blood glucose monitoring test strip    PAT CONTOUR NEXT    200 strip    Use to test blood sugar 6-8 times daily or as directed.    Acute on chronic pancreatitis (H), Post-pancreatectomy diabetes (H)       * blood glucose monitoring test strip    PAT CONTOUR NEXT    2 Box    Use to test blood sugar 6 times daily or as directed.    Post-pancreatectomy diabetes (H)       celecoxib 100 MG capsule    celeBREX    60 capsule    Take 1 capsule (100 mg) by mouth 2 times daily    Chronic abdominal pain       cetirizine 10 MG tablet    zyrTEC     Take 10 mg by mouth daily    Elevated liver enzymes       CONTOUR NEXT EZ MONITOR W/DEVICE Kit      1 Device 6 times daily    Itching, Post-pancreatectomy diabetes (H), History of pancreatectomy, Pancreatic insufficiency       diphenhydrAMINE 25 MG capsule    BENADRYL ALLERGY    56 capsule    Take 1 capsule (25 mg) by mouth every 6 hours as needed for itching or allergies    Itching, Post-pancreatectomy diabetes (H), History of pancreatectomy, Pancreatic insufficiency       docusate sodium 100 MG capsule    COLACE    60 capsule    Take 1 capsule (100 mg) by mouth 2 times daily as needed for constipation,    Itching, Post-pancreatectomy diabetes (H), History of pancreatectomy, Pancreatic insufficiency       glucagon 1 MG kit    GLUCAGON EMERGENCY    1 mg    Inject 1 mg into the muscle once for 1 dose     Post-pancreatectomy diabetes (H)       glucose 40 % Gel gel     3 Tube    Take 15-30 g by mouth every 15 minutes as needed for low blood sugar    Acquired total absence of pancreas       Injection Device for insulin Tika    NOVOPEN ECHO    1 each    1 each daily as needed    Post-pancreatectomy diabetes (H)       insulin aspart 100 UNIT/ML injection    NovoLOG PENFILL    3 mL    Administer subcutaneously 0.5 unit per 45 grams of carbohydrates.    Post-pancreatectomy diabetes (H)       insulin pen needle 32G X 4 MM    BD KEN U/F    100 each    Use 6-8 times per day    Acquired total absence of pancreas       LANsoprazole 30 MG CR capsule    PREVACID    30 capsule    Take 1 capsule (30 mg) by mouth daily Take 30-60 minutes before a meal.    Post-pancreatectomy diabetes (H), Pancreatic insufficiency       levothyroxine 125 MCG tablet    SYNTHROID/LEVOTHROID    1 tablet    Take 1 tablet (125 mcg), by mouth daily before breakfast.    Itching, Post-pancreatectomy diabetes (H), History of pancreatectomy, Pancreatic insufficiency       Lubiprostone 8 MCG Caps capsule     180 capsule    Take 3 capsules (24mcg ) twice a day or as directed.    Chronic constipation       metroNIDAZOLE 250 MG tablet    FLAGYL    30 tablet    Take 1 tablet (250 mg) by mouth 3 times daily    Bacterial overgrowth syndrome       multivitamin CF formula capsule    CHOICEFUL     Take 1 tablet by mouth daily    Itching, Post-pancreatectomy diabetes (H), History of pancreatectomy, Pancreatic insufficiency       order for DME     1 Device    TENS Unit    Shoulder pain       prochlorperazine 5 MG tablet    COMPAZINE    90 tablet    Take two 5 mg tablets by mouth every six hours as needed for nausea.    Itching, Post-pancreatectomy diabetes (H), History of pancreatectomy, Pancreatic insufficiency       senna-docusate 8.6-50 MG per tablet    SENOKOT-S;PERICOLACE    100 tablet    Take 1 tablet by mouth 2 times daily as needed for constipation    Itching,  Post-pancreatectomy diabetes (H), History of pancreatectomy, Pancreatic insufficiency       VAGIFEM 10 MCG Tabs vaginal tablet   Generic drug:  estradiol           ZOMIG 5 MG tablet   Generic drug:  ZOLMitriptan      Take 5 mg by mouth at onset of headache for migraine        * Notice:  This list has 2 medication(s) that are the same as other medications prescribed for you. Read the directions carefully, and ask your doctor or other care provider to review them with you.

## 2018-01-25 NOTE — PROGRESS NOTES
"[facility]   Comprehensive Pain Program   Psychology Progress Note    Patient Name: Dinora Mcghee    YOB: 1965   Medical Record Number: [id number]  Date: 1/25/2018              SUBJECTIVE              Interval history:   Doing well with post op issues  Though still with fatigue.   Still frequent migraines,  That being her primary problem at this point.   Has had increased stress dealing with parents who have moved back to live near by  and are difficult people    OBJECTIVE:              Length of Visit: 75 minutes        Mental status: No Distress - Normal Mental Status          Session objective:   Continued behavioral pain management skill improvement         Behavioral inventions and response:                          CBT   On guilt and boundaries and fears of being like mom and repeating those patterns with her daughter              Breathing Imagery   Taught earth               Resourced Brainspotting/Eye position therapy [employer]  is  \"pushing through\"  The guilt push to ignore pain and her body and  Please others                                   Resource spot   7 pt grid  And did  ment spot with rabbit imagery   .          Beginning  SUDS =  7              Ending SUDS  =  2       And processed several layers of shame b/c she does not like her parents and is furious at her mothers  Rude behaviors                                                 [place]  I am not like my mother and I deserve to be loved                              ASSESSMENT              Diagnoses:                                                F43.8      Other stress related disorder                                                               Psychosocial and Contextual Factors: had fluctuating course         Progress toward goals: as expected.              Pain status since onset of pain services: improved           Emotional status since onset of pain services: improved       Medication / chemical use concerns: " "None        PLAN:               Next Appointment: Dinora Mcghee will schedule a follow-up appointment in 2 weeks.         Assignment: Establish a daily routine of stretching and exercise, Healthy breathing skills  for daily use, Constructively grieve losses which include   \"good mom\" and auditory EMDR         Objectives / interventions for next session:          Improve pain management skills: Goals include:  Resource Brainspotting/Eye Position therapy   To focus on issues that trigger and support migraines/ SNS activation    Do imagery guide for attachment next time           Maria Elena Abdalla, Ph.D., L.P. ...............1/25/2018 10:59 AM                Medical Psychologist                       "

## 2018-02-02 DIAGNOSIS — R51.9 HEADACHE DISORDER: ICD-10-CM

## 2018-02-02 RX ORDER — AMITRIPTYLINE HYDROCHLORIDE 10 MG/1
50 TABLET ORAL AT BEDTIME
Qty: 150 TABLET | Refills: 3 | Status: SHIPPED | OUTPATIENT
Start: 2018-02-02 | End: 2018-05-15

## 2018-02-12 ASSESSMENT — ENCOUNTER SYMPTOMS
EYE REDNESS: 1
SPEECH CHANGE: 0
DISTURBANCES IN COORDINATION: 0
BOWEL INCONTINENCE: 0
JAUNDICE: 0
MEMORY LOSS: 0
INCREASED ENERGY: 0
BLOOD IN STOOL: 0
EYE WATERING: 0
DIZZINESS: 1
DIARRHEA: 1
NERVOUS/ANXIOUS: 0
TREMORS: 0
NECK MASS: 0
SINUS CONGESTION: 1
POLYPHAGIA: 0
ALTERED TEMPERATURE REGULATION: 0
DEPRESSION: 1
FATIGUE: 1
VOMITING: 1
DOUBLE VISION: 0
CHILLS: 0
PARALYSIS: 0
HEARTBURN: 0
TROUBLE SWALLOWING: 0
WEIGHT GAIN: 0
HEADACHES: 1
DECREASED APPETITE: 0
SINUS PAIN: 1
TASTE DISTURBANCE: 0
PANIC: 0
WEAKNESS: 0
FEVER: 0
SMELL DISTURBANCE: 0
EYE PAIN: 1
RECTAL PAIN: 0
POLYDIPSIA: 0
SEIZURES: 0
ABDOMINAL PAIN: 1
EYE IRRITATION: 1
LOSS OF CONSCIOUSNESS: 0
TINGLING: 1
HOARSE VOICE: 0
NUMBNESS: 0
BLOATING: 1
WEIGHT LOSS: 0
INSOMNIA: 1
SORE THROAT: 0
CONSTIPATION: 1
DECREASED CONCENTRATION: 1
HALLUCINATIONS: 0
NAUSEA: 1
NIGHT SWEATS: 1

## 2018-02-12 ASSESSMENT — ACTIVITIES OF DAILY LIVING (ADL): I_KEEP_THINKING_ABOUT_HOW_BADLY_I_WANT_THE_PAIN_TO_STOP: 0 = NOT AT ALL

## 2018-02-13 ENCOUNTER — OFFICE VISIT (OUTPATIENT)
Dept: ANESTHESIOLOGY | Facility: CLINIC | Age: 53
End: 2018-02-13
Payer: COMMERCIAL

## 2018-02-13 DIAGNOSIS — F43.89 ADJUSTMENT REACTION TO CHRONIC STRESS: Primary | ICD-10-CM

## 2018-02-13 NOTE — MR AVS SNAPSHOT
After Visit Summary   2/13/2018    Dinora Mcghee    MRN: [id number]           Patient Information     Date Of Birth          1965        Visit Information        Provider Department      2/13/2018 9:00 AM Maria Elena Abdalla,  [facility]        Today's Diagnoses     Adjustment reaction to chronic stress    -  1       Follow-ups after your visit        Follow-up notes from your care team     Return in about 2 weeks (around 2/27/2018).      Your next 10 appointments already scheduled     Feb 21, 2018  1:30 PM CST   (Arrive by 1:15 PM)   Return Visit with Ruiz Canchola MD   [facility] ([facility])    [address]  3rd Floor  [place] [facility] [place] [id number] [phone]            Feb 28, 2018 11:00 AM CST   (Arrive by 10:45 AM)   Return Visit with Vijaya Genao MD   [facility] ([facility])    [address]  4th Floor  [place] [id number] [phone]            Mar 23, 2018  8:30 AM CDT   (Arrive by 8:15 AM)   Return Visit with Jesse Richardson DO   [facility] ([facility])    [address]  [address]  [place] [id number] [phone]              Who to contact     Please call your clinic at [phone] to:    Ask questions about your health    Make or cancel appointments    Discuss your medicines    Learn about your test results    Speak to your doctor            Additional Information About Your Visit        MyChart Information     [employer] gives you secure access to your electronic health record. If you see a primary care provider, you can also send messages to your care team and make appointments. If you have questions, please call your primary care clinic.  If you do not have a primary care provider, please call [phone] and they will assist you.      [employer]  is an electronic gateway that provides easy, online access to your medical records. With [employer], you can request a clinic appointment, read your test results, renew a prescription or communicate with your care team.     To access your existing account, please contact your [facility] or call [phone] for assistance.        Care EveryWhere ID     This is your Care EveryWhere ID. This could be used by other organizations to access your [place] medical records  [id number]         Blood Pressure from Last 3 Encounters:   01/16/18 119/72   12/20/17 115/74   11/01/17 114/63    Weight from Last 3 Encounters:   01/16/18 67.6 kg (149 lb 1.6 oz)   11/01/17 67.8 kg (149 lb 6.4 oz)   10/20/17 68.4 kg (150 lb 14.4 oz)              Today, you had the following     No orders found for display         Today's Medication Changes          These changes are accurate as of 2/13/18 10:48 AM.  If you have any questions, ask your nurse or doctor.               These medicines have changed or have updated prescriptions.        Dose/Directions    levothyroxine 125 MCG tablet   Commonly known as:  SYNTHROID/LEVOTHROID   This may have changed:    - how much to take  - how to take this  - additional instructions   Used for:  Itching, Post-pancreatectomy diabetes (H), History of pancreatectomy, Pancreatic insufficiency        Take 1 tablet (125 mcg), by mouth daily before breakfast.   Quantity:  1 tablet   Refills:  0                Primary Care Provider Office Phone # Fax #    Justyna Lawson [phone]       [address] [address]        Equal Access to Services     [facility] AH: Hadii monty sotelo hadasho Soomaali, waaxda luqadaha, qaybta kaalmada kellie, ivan guerra. So [facility] [place] [facility] [phone].    ATENCIÓN: Si habla español, tiene a muñoz disposición servicios gratuitos de asistencia lingüística. Llame al [phone].    We comply  with applicable federal civil rights laws and [place] laws. We do not discriminate on the basis of race, color, national origin, age, disability, sex, sexual orientation, or gender identity.            Thank you!     Thank you for choosing [facility]  for your care. Our goal is always to provide you with excellent care. Hearing back from our patients is one way we can continue to improve our services. Please take a few minutes to complete the written survey that you may receive in the mail after your visit with us. Thank you!             Your Updated Medication List - Protect others around you: Learn how to safely use, store and throw away your medicines at www.disposemymeds.org.          This list is accurate as of 2/13/18 10:48 AM.  Always use your most recent med list.                   Brand Name Dispense Instructions for use Diagnosis    acetaminophen 500 MG Caps      Take 2 mg by mouth 2 times daily        amitriptyline 10 MG tablet    ELAVIL    150 tablet    Take 5 tablets (50 mg) by mouth At Bedtime    Headache disorder       amylase-lipase-protease [id number] UNITS Cpep per EC capsule    CREON 24    450 capsule    Take 3-4 capsules by mouth with meals and 1-2 with snacks. Maximum 15 capsules per day.    Acquired total absence of pancreas       aspirin 81 MG EC tablet     90 tablet    Take 1 tablet (81 mg) by mouth daily    High platelet count (H)       azelastine 0.1 % spray    ASTELIN     Spray 1 spray into both nostrils 2 times daily        BD SHARPS  Misc     1 each    1 Container as needed    Post-pancreatectomy diabetes (H)       blood glucose monitoring lancets     1 Box    Use to test blood sugar 6-8 times daily or as directed.    Acute on chronic pancreatitis (H)       * blood glucose monitoring test strip    PAT CONTOUR NEXT    200 strip    Use to test blood sugar 6-8 times daily or as directed.    Acute on chronic pancreatitis (H),  Post-pancreatectomy diabetes (H)       * blood glucose monitoring test strip    PAT CONTOUR NEXT    2 Box    Use to test blood sugar 6 times daily or as directed.    Post-pancreatectomy diabetes (H)       celecoxib 100 MG capsule    celeBREX    60 capsule    Take 1 capsule (100 mg) by mouth 2 times daily    Chronic abdominal pain       cetirizine 10 MG tablet    zyrTEC     Take 10 mg by mouth daily    Elevated liver enzymes       CONTOUR NEXT EZ MONITOR W/DEVICE Kit      1 Device 6 times daily    Itching, Post-pancreatectomy diabetes (H), History of pancreatectomy, Pancreatic insufficiency       diphenhydrAMINE 25 MG capsule    BENADRYL ALLERGY    56 capsule    Take 1 capsule (25 mg) by mouth every 6 hours as needed for itching or allergies    Itching, Post-pancreatectomy diabetes (H), History of pancreatectomy, Pancreatic insufficiency       docusate sodium 100 MG capsule    COLACE    60 capsule    Take 1 capsule (100 mg) by mouth 2 times daily as needed for constipation,    Itching, Post-pancreatectomy diabetes (H), History of pancreatectomy, Pancreatic insufficiency       glucagon 1 MG kit    GLUCAGON EMERGENCY    1 mg    Inject 1 mg into the muscle once for 1 dose    Post-pancreatectomy diabetes (H)       glucose 40 % Gel gel     3 Tube    Take 15-30 g by mouth every 15 minutes as needed for low blood sugar    Acquired total absence of pancreas       Injection Device for insulin Tika    NOVOPEN ECHO    1 each    1 each daily as needed    Post-pancreatectomy diabetes (H)       insulin aspart 100 UNIT/ML injection    NovoLOG PENFILL    3 mL    Administer subcutaneously 0.5 unit per 45 grams of carbohydrates.    Post-pancreatectomy diabetes (H)       insulin pen needle 32G X 4 MM    BD KEN U/F    100 each    Use 6-8 times per day    Acquired total absence of pancreas       LANsoprazole 30 MG CR capsule    PREVACID    30 capsule    Take 1 capsule (30 mg) by mouth daily Take 30-60 minutes before a meal.     Post-pancreatectomy diabetes (H), Pancreatic insufficiency       levothyroxine 125 MCG tablet    SYNTHROID/LEVOTHROID    1 tablet    Take 1 tablet (125 mcg), by mouth daily before breakfast.    Itching, Post-pancreatectomy diabetes (H), History of pancreatectomy, Pancreatic insufficiency       Lubiprostone 8 MCG Caps capsule     180 capsule    Take 3 capsules (24mcg ) twice a day or as directed.    Chronic constipation       metroNIDAZOLE 250 MG tablet    FLAGYL    30 tablet    Take 1 tablet (250 mg) by mouth 3 times daily    Bacterial overgrowth syndrome       multivitamin CF formula capsule    CHOICEFUL     Take 1 tablet by mouth daily    Itching, Post-pancreatectomy diabetes (H), History of pancreatectomy, Pancreatic insufficiency       order for DME     1 Device    TENS Unit    Shoulder pain       prochlorperazine 5 MG tablet    COMPAZINE    90 tablet    Take two 5 mg tablets by mouth every six hours as needed for nausea.    Itching, Post-pancreatectomy diabetes (H), History of pancreatectomy, Pancreatic insufficiency       senna-docusate 8.6-50 MG per tablet    SENOKOT-S;PERICOLACE    100 tablet    Take 1 tablet by mouth 2 times daily as needed for constipation    Itching, Post-pancreatectomy diabetes (H), History of pancreatectomy, Pancreatic insufficiency       VAGIFEM 10 MCG Tabs vaginal tablet   Generic drug:  estradiol           ZOMIG 5 MG tablet   Generic drug:  ZOLMitriptan      Take 5 mg by mouth at onset of headache for migraine        * Notice:  This list has 2 medication(s) that are the same as other medications prescribed for you. Read the directions carefully, and ask your doctor or other care provider to review them with you.
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
## 2018-02-20 ASSESSMENT — ENCOUNTER SYMPTOMS
INCREASED ENERGY: 0
EYE REDNESS: 1
NIGHT SWEATS: 1
ABDOMINAL PAIN: 1
ALTERED TEMPERATURE REGULATION: 0
EYE PAIN: 1
INSOMNIA: 1
DIZZINESS: 0
DOUBLE VISION: 0
WEIGHT LOSS: 1
NERVOUS/ANXIOUS: 1
PARALYSIS: 0
VOMITING: 1
NAUSEA: 1
CHILLS: 0
DISTURBANCES IN COORDINATION: 0
SINUS PAIN: 1
POLYDIPSIA: 0
DEPRESSION: 1
SPEECH CHANGE: 0
FATIGUE: 1
BLOATING: 1
TROUBLE SWALLOWING: 0
TINGLING: 1
HEARTBURN: 0
TREMORS: 0
TASTE DISTURBANCE: 0
NECK MASS: 0
DIARRHEA: 1
DECREASED CONCENTRATION: 1
WEAKNESS: 0
FEVER: 0
POLYPHAGIA: 0
HEADACHES: 1
WEIGHT GAIN: 0
SORE THROAT: 0
SMELL DISTURBANCE: 0
HOARSE VOICE: 0
EYE IRRITATION: 1
CONSTIPATION: 1
HALLUCINATIONS: 0
NUMBNESS: 0
SEIZURES: 0
PANIC: 0
BOWEL INCONTINENCE: 0
EYE WATERING: 1
LOSS OF CONSCIOUSNESS: 0
RECTAL PAIN: 0
SINUS CONGESTION: 1
DECREASED APPETITE: 0
MEMORY LOSS: 0
JAUNDICE: 0
BLOOD IN STOOL: 0

## 2018-02-21 ENCOUNTER — OFFICE VISIT (OUTPATIENT)
Dept: NEUROLOGY | Facility: CLINIC | Age: 53
End: 2018-02-21
Payer: COMMERCIAL

## 2018-02-21 VITALS
HEART RATE: 72 BPM | WEIGHT: 148.1 LBS | HEIGHT: 68 IN | BODY MASS INDEX: 22.45 KG/M2 | DIASTOLIC BLOOD PRESSURE: 70 MMHG | SYSTOLIC BLOOD PRESSURE: 116 MMHG

## 2018-02-21 DIAGNOSIS — H51.11 CONVERGENCE INSUFFICIENCY: ICD-10-CM

## 2018-02-21 DIAGNOSIS — R51.9 HEADACHE DISORDER: ICD-10-CM

## 2018-02-21 DIAGNOSIS — H53.8 BLURRED VISION: ICD-10-CM

## 2018-02-21 DIAGNOSIS — H04.123 DRY EYES: Primary | ICD-10-CM

## 2018-02-21 RX ORDER — AMITRIPTYLINE HYDROCHLORIDE 10 MG/1
5 TABLET ORAL
COMMUNITY
Start: 2017-07-26 | End: 2018-05-15

## 2018-02-21 RX ORDER — AMITRIPTYLINE HYDROCHLORIDE 10 MG/1
10 TABLET ORAL AT BEDTIME
Qty: 60 TABLET | Refills: 3 | Status: ON HOLD | OUTPATIENT
Start: 2018-02-21 | End: 2018-08-24

## 2018-02-21 RX ORDER — LUBIPROSTONE 24 UG/1
2-3 CAPSULE ORAL
COMMUNITY
Start: 2017-05-04 | End: 2018-05-15

## 2018-02-21 RX ORDER — AMITRIPTYLINE HYDROCHLORIDE 50 MG/1
50 TABLET ORAL AT BEDTIME
Qty: 60 TABLET | Refills: 3 | Status: SHIPPED | OUTPATIENT
Start: 2018-02-21 | End: 2019-09-17

## 2018-02-21 ASSESSMENT — PAIN SCALES - GENERAL: PAINLEVEL: MODERATE PAIN (4)

## 2018-02-21 NOTE — LETTER
2018       RE: Dinora Mcghee  [address]  [place] [address]     Dear Colleague,    Thank you for referring your patient, Dinora Mcghee, to the [facility] at [facility]. Please see a copy of my visit note below.    Service Date: 2018      Justyna Lawson MD    [facility] [place]   [address]   [place] [address]      RE: Dinora Loo   MRN: [id number]   : 1965      Dear Dr. Lawson:      Thank you for referring Dinora Loo back for neurologic followup on 2018.  The patient comes with a chief complaint of classic migraine.      The patient was earlier seen on 2017 for migraine.  She increased her dose of amitriptyline at my suggestion.  She said she has had a good response from the higher dose.  She said that she has had probably 1 or 2 headaches per week  since that time.  They have been less intense.  The patient has had some dry mouth, but she has been able to keep her mouth moist and she had dry mouth before starting the medication.  She has been chewing sugarless gum.  She did discuss with me bloating and gas issues as well as diarrhea that came for a few days.  She had earlier been started on Flagyl for this.  She had a rash with it and stopped it and rash left.  Her symptoms improved.  She has not gone ahead to consider FL-40 lenses to see if this would help her chronic headaches.  Glare has been a major issue as well as fluorescent light.  The patient does have an interest regarding this.  She has also noted her headaches do occur when she has been attempting to focus.  She did note that she has had chronic dry eye issues and she does use hot packs.  She has not had any orthostatic complaints nor has she had any worsening of chronic constipation.  She has a prescription for Amitiza if needed. We discussed amitriptyline dosage and side effects.  She would like to  consider going to a higher dosage if possible.  She is on the 50 mg dose.  She did have a normal EKG on 01/17/2018.  The patient did discuss with me the use of Cefaly device today.  She is interested in having formal neural eye examination to consider special lenses to help with her light sensitivity but also because of her problems with near vision.  She has interest in purchasing the Cefaly machine and I did go over its use and its treatment, chronic migraine.  She is aware that if she fails treatment that has been recommended she may be a candidate for Botox injections.  She could conceivably try other medications that are preventative orally.      Neurologic examination showed the patient's blood pressure was 116/70 with a pulse of 72.  She had normal cardiac rhythm without any gallops or murmurs.  She did have a supple neck.  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  She had good extraocular movements.      IMPRESSION:  Chronic migraine.      The patient has had chronic migraine and I did give her further suggestions for treatment.  She will cautiously increase her dose of amitriptyline to 60 mg.  I have warned her about worsening dry mouth as well as cardiovascular issues including orthostatic hypotension.  The patient is going to be seen in followup in the Neurology Department in 6-12 months and on a p.r.n. basis.  She said she would keep in touch by phone.  I have referred her to the Neuro-Ophthalmologist here.      Thank you for allowing me to see this patient.      Sincerely yours,      Ruiz Canchola MD      I spent 20 minutes with the patient.  Over 50% of the time this involved counseling and coordination of care.      cc:   Vijaya Wilcox MD   [address]   [place] [address]     D: 03/05/2018   T: 03/05/2018   MT: AKA      Name:     SUSANMALINI   MRN:       [id number]        Account:      [id number]   :      1965           Service Date: 2018      Document: [id number]

## 2018-02-21 NOTE — MR AVS SNAPSHOT
After Visit Summary   2/21/2018    Dinora Mcghee    MRN: [id number]           Patient Information     Date Of Birth          1965        Visit Information        Provider Department      2/21/2018 1:30 PM Ruiz Canchola MD [facility] Neurology        Today's Diagnoses     Dry eyes    -  1    Blurred vision        Convergence insufficiency        Headache disorder           Follow-ups after your visit        Additional Services     OPHTHALMOLOGY ADULT REFERRAL       Your provider has referred you to: [facility]: Eye Clinic - [place] [phone]   [email or web address] [email or web address]    Please be aware that coverage of these services is subject to the terms and limitations of your health insurance plan.  Call member services at your health plan with any benefit or coverage questions.      Please bring the following with you to your appointment:    (1) Any X-Rays, CTs or MRIs which have been performed.  Contact the facility where they were done to arrange for  prior to your scheduled appointment.    (2) List of current medications  (3) This referral request   (4) Any documents/labs given to you for this referral                  Follow-up notes from your care team     Return in about 1 year (around 2/21/2019).      Your next 10 appointments already scheduled     Mar 23, 2018  8:30 AM CDT   (Arrive by 8:15 AM)   Return Visit with Jesse Richardson DO   [facility] Rheumatology ([facility] [place])    [address] [place] [address]  [address] [place] [address] [phone]            Jun 06, 2018  4:20 PM CDT   (Arrive by 4:05 PM)   Return Visit with Vijaya Genao MD   [facility]astroenterology [facility] ([facility])    [address] [place] [address]  4th Floor  [facility] [address] [phone]              Who to contact     Please call your clinic at [phone] to:    Ask questions about  "your health    Make or cancel appointments    Discuss your medicines    Learn about your test results    Speak to your doctor            Additional Information About Your Visit        [employer] Information     [employer] gives you secure access to your electronic health record. If you see a primary care provider, you can also send messages to your care team and make appointments. If you have questions, please call your primary care clinic.  If you do not have a primary care provider, please call [phone] and they will assist you.      [employer] is an electronic gateway that provides easy, online access to your medical records. With [employer], you can request a clinic appointment, read your test results, renew a prescription or communicate with your care team.     To access your existing account, please contact your [facility] or call [phone] for assistance.        Care EveryWhere ID     This is your Care EveryWhere ID. This could be used by other organizations to access your [facility] medical records  [id number]        Your Vitals Were     Pulse Height BMI (Body Mass Index)             72 1.727 m (5' 8\") 22.52 kg/m2          Blood Pressure from Last 3 Encounters:   02/28/18 111/64   02/21/18 116/70   01/16/18 119/72    Weight from Last 3 Encounters:   02/28/18 66.8 kg (147 lb 3.2 oz)   02/21/18 67.2 kg (148 lb 1.6 oz)   01/16/18 67.6 kg (149 lb 1.6 oz)              We Performed the Following     OPHTHALMOLOGY ADULT REFERRAL          Today's Medication Changes          These changes are accurate as of 2/21/18 11:59 PM.  If you have any questions, ask your nurse or doctor.               These medicines have changed or have updated prescriptions.        Dose/Directions    * amitriptyline 10 MG tablet   Commonly known as:  ELAVIL   This may have changed:  Another medication with the same name was added. Make sure you understand how and when to take each.   Changed by:  Ruiz Canchola " MD Eugene        Dose:  5 tablet   Take 5 tablets by mouth   Refills:  0       * amitriptyline 10 MG tablet   Commonly known as:  ELAVIL   This may have changed:  Another medication with the same name was added. Make sure you understand how and when to take each.   Used for:  Headache disorder   Changed by:  Ruiz Canchola MD        Dose:  50 mg   Take 5 tablets (50 mg) by mouth At Bedtime   Quantity:  150 tablet   Refills:  3       * amitriptyline 50 MG tablet   Commonly known as:  ELAVIL   This may have changed:  You were already taking a medication with the same name, and this prescription was added. Make sure you understand how and when to take each.   Used for:  Headache disorder   Changed by:  Ruiz Canchola MD        Dose:  50 mg   Take 1 tablet (50 mg) by mouth At Bedtime   Quantity:  60 tablet   Refills:  3       * amitriptyline 10 MG tablet   Commonly known as:  ELAVIL   This may have changed:  You were already taking a medication with the same name, and this prescription was added. Make sure you understand how and when to take each.   Used for:  Headache disorder   Changed by:  Ruiz Canchola MD        Dose:  10 mg   Take 1 tablet (10 mg) by mouth At Bedtime   Quantity:  60 tablet   Refills:  3       blood glucose monitoring test strip   Commonly known as:  PAT CONTOUR NEXT   This may have changed:  Another medication with the same name was removed. Continue taking this medication, and follow the directions you see here.   Used for:  Post-pancreatectomy diabetes (H)   Changed by:  Ruiz Canchola MD        Use to test blood sugar 6 times daily or as directed.   Quantity:  2 Box   Refills:  11       levothyroxine 125 MCG tablet   Commonly known as:  SYNTHROID/LEVOTHROID   This may have changed:    - how much to take  - how to take this  - additional instructions   Used for:  Itching, Post-pancreatectomy diabetes (H), History of pancreatectomy, Pancreatic insufficiency         Take 1 tablet (125 mcg), by mouth daily before breakfast.   Quantity:  1 tablet   Refills:  0       lubiprostone 24 MCG capsule   Commonly known as:  AMITIZA   This may have changed:  Another medication with the same name was removed. Continue taking this medication, and follow the directions you see here.   Changed by:  Ruiz Canchola MD        Dose:  2-3 capsule   Take 2-3 capsules by mouth   Refills:  0       order for DME   This may have changed:  additional instructions   Used for:  Headache disorder   Changed by:  Ruiz Canchola MD        Equipment being ordered:Cefaly   Quantity:  1 Device   Refills:  0       * Notice:  This list has 4 medication(s) that are the same as other medications prescribed for you. Read the directions carefully, and ask your doctor or other care provider to review them with you.      Stop taking these medicines if you haven't already. Please contact your care team if you have questions.     metroNIDAZOLE 250 MG tablet   Commonly known as:  FLAGYL   Stopped by:  Ruiz Canchola MD                Where to get your medicines      These medications were sent to [id number] - [place], [place] - [address]  [address] [facility] [address]     Phone:  [phone]     amitriptyline 10 MG tablet    amitriptyline 50 MG tablet         Some of these will need a paper prescription and others can be bought over the counter.  Ask your nurse if you have questions.     Bring a paper prescription for each of these medications     order for DME                Primary Care Provider Office Phone # Fax #    Justyna Lawson [phone]       [facility] [address] [place] [address]  [facility] [address]        Equal Access to Services     [facility]: Hadii monty sotelo hademilieo Sojesseali, waaxda luqadaha, qaybta kaalmada adekarla, ivan wallace . [facility] [phone].    ATENCIÓN: claudia Samuels  disposición servicios gratuitos de asistencia lingüística. Howard yanez [phone].    We comply with applicable federal civil rights laws and [place] laws. We do not discriminate on the basis of race, color, national origin, age, disability, sex, sexual orientation, or gender identity.            Thank you!     Thank you for choosing [facility] NEUROLOGY  for your care. Our goal is always to provide you with excellent care. Hearing back from our patients is one way we can continue to improve our services. Please take a few minutes to complete the written survey that you may receive in the mail after your visit with us. Thank you!             Your Updated Medication List - Protect others around you: Learn how to safely use, store and throw away your medicines at www.disposemymeds.org.          This list is accurate as of 2/21/18 11:59 PM.  Always use your most recent med list.                   Brand Name Dispense Instructions for use Diagnosis    acetaminophen 500 MG Caps      Take 2 mg by mouth 2 times daily        * amitriptyline 10 MG tablet    ELAVIL     Take 5 tablets by mouth        * amitriptyline 10 MG tablet    ELAVIL    150 tablet    Take 5 tablets (50 mg) by mouth At Bedtime    Headache disorder       * amitriptyline 50 MG tablet    ELAVIL    60 tablet    Take 1 tablet (50 mg) by mouth At Bedtime    Headache disorder       * amitriptyline 10 MG tablet    ELAVIL    60 tablet    Take 1 tablet (10 mg) by mouth At Bedtime    Headache disorder       amylase-lipase-protease 45469-65809 UNITS Cpep per EC capsule    CREON 24    450 capsule    Take 3-4 capsules by mouth with meals and 1-2 with snacks. Maximum 15 capsules per day.    Acquired total absence of pancreas       aspirin 81 MG EC tablet     90 tablet    Take 1 tablet (81 mg) by mouth daily    High platelet count (H)       azelastine 0.1 % spray    ASTELIN     Spray 1 spray into both nostrils 2 times daily        BD SHARPS  Misc     1 each    1  Container as needed    Post-pancreatectomy diabetes (H)       blood glucose monitoring lancets     1 Box    Use to test blood sugar 6-8 times daily or as directed.    Acute on chronic pancreatitis (H)       blood glucose monitoring test strip    PAT CONTOUR NEXT    2 Box    Use to test blood sugar 6 times daily or as directed.    Post-pancreatectomy diabetes (H)       celecoxib 100 MG capsule    celeBREX    60 capsule    Take 1 capsule (100 mg) by mouth 2 times daily    Chronic abdominal pain       cetirizine 10 MG tablet    zyrTEC     Take 10 mg by mouth daily    Elevated liver enzymes       CONTOUR NEXT EZ MONITOR W/DEVICE Kit      1 Device 6 times daily    Itching, Post-pancreatectomy diabetes (H), History of pancreatectomy, Pancreatic insufficiency       diphenhydrAMINE 25 MG capsule    BENADRYL ALLERGY    56 capsule    Take 1 capsule (25 mg) by mouth every 6 hours as needed for itching or allergies    Itching, Post-pancreatectomy diabetes (H), History of pancreatectomy, Pancreatic insufficiency       docusate sodium 100 MG capsule    COLACE    60 capsule    Take 1 capsule (100 mg) by mouth 2 times daily as needed for constipation,    Itching, Post-pancreatectomy diabetes (H), History of pancreatectomy, Pancreatic insufficiency       glucagon 1 MG kit    GLUCAGON EMERGENCY    1 mg    Inject 1 mg into the muscle once for 1 dose    Post-pancreatectomy diabetes (H)       glucose 40 % Gel gel     3 Tube    Take 15-30 g by mouth every 15 minutes as needed for low blood sugar    Acquired total absence of pancreas       Injection Device for insulin Tika    NOVOPEN ECHO    1 each    1 each daily as needed    Post-pancreatectomy diabetes (H)       insulin aspart 100 UNIT/ML injection    NovoLOG PENFILL    3 mL    Administer subcutaneously 0.5 unit per 45 grams of carbohydrates.    Post-pancreatectomy diabetes (H)       insulin pen needle 32G X 4 MM    BD KEN U/F    100 each    Use 6-8 times per day    Acquired total  absence of pancreas       LANsoprazole 30 MG CR capsule    PREVACID    30 capsule    Take 1 capsule (30 mg) by mouth daily Take 30-60 minutes before a meal.    Post-pancreatectomy diabetes (H), Pancreatic insufficiency       levothyroxine 125 MCG tablet    SYNTHROID/LEVOTHROID    1 tablet    Take 1 tablet (125 mcg), by mouth daily before breakfast.    Itching, Post-pancreatectomy diabetes (H), History of pancreatectomy, Pancreatic insufficiency       lubiprostone 24 MCG capsule    AMITIZA     Take 2-3 capsules by mouth        multivitamin CF formula capsule    CHOICEFUL     Take 1 tablet by mouth daily    Itching, Post-pancreatectomy diabetes (H), History of pancreatectomy, Pancreatic insufficiency       order for DME     1 Device    Equipment being ordered:Cefaly    Headache disorder       prochlorperazine 5 MG tablet    COMPAZINE    90 tablet    Take two 5 mg tablets by mouth every six hours as needed for nausea.    Itching, Post-pancreatectomy diabetes (H), History of pancreatectomy, Pancreatic insufficiency       senna-docusate 8.6-50 MG per tablet    SENOKOT-S;PERICOLACE    100 tablet    Take 1 tablet by mouth 2 times daily as needed for constipation    Itching, Post-pancreatectomy diabetes (H), History of pancreatectomy, Pancreatic insufficiency       VAGIFEM 10 MCG Tabs vaginal tablet   Generic drug:  estradiol           ZOMIG 5 MG tablet   Generic drug:  ZOLMitriptan      Take 5 mg by mouth at onset of headache for migraine        * Notice:  This list has 4 medication(s) that are the same as other medications prescribed for you. Read the directions carefully, and ask your doctor or other care provider to review them with you.

## 2018-02-23 ENCOUNTER — TELEPHONE (OUTPATIENT)
Dept: GASTROENTEROLOGY | Facility: CLINIC | Age: 53
End: 2018-02-23

## 2018-02-28 ENCOUNTER — TELEPHONE (OUTPATIENT)
Dept: GASTROENTEROLOGY | Facility: CLINIC | Age: 53
End: 2018-02-28

## 2018-02-28 ENCOUNTER — OFFICE VISIT (OUTPATIENT)
Dept: GASTROENTEROLOGY | Facility: CLINIC | Age: 53
End: 2018-02-28
Payer: COMMERCIAL

## 2018-02-28 VITALS
SYSTOLIC BLOOD PRESSURE: 111 MMHG | WEIGHT: 147.2 LBS | HEIGHT: 68 IN | DIASTOLIC BLOOD PRESSURE: 64 MMHG | TEMPERATURE: 98.4 F | OXYGEN SATURATION: 97 % | HEART RATE: 90 BPM | BODY MASS INDEX: 22.31 KG/M2

## 2018-02-28 DIAGNOSIS — Z90.410 HISTORY OF PANCREATECTOMY: ICD-10-CM

## 2018-02-28 DIAGNOSIS — K63.8219 SMALL INTESTINAL BACTERIAL OVERGROWTH: Primary | ICD-10-CM

## 2018-02-28 DIAGNOSIS — K59.09 OTHER CONSTIPATION: ICD-10-CM

## 2018-02-28 DIAGNOSIS — R11.2 NON-INTRACTABLE VOMITING WITH NAUSEA, UNSPECIFIED VOMITING TYPE: ICD-10-CM

## 2018-02-28 RX ORDER — LUBIPROSTONE 8 UG/1
5 CAPSULE ORAL DAILY
Qty: 150 CAPSULE | Refills: 3 | Status: SHIPPED | OUTPATIENT
Start: 2018-02-28 | End: 2018-02-28

## 2018-02-28 RX ORDER — LUBIPROSTONE 8 UG/1
5 CAPSULE ORAL DAILY
Qty: 150 CAPSULE | Refills: 3 | Status: SHIPPED | OUTPATIENT
Start: 2018-02-28 | End: 2018-07-11

## 2018-02-28 ASSESSMENT — PAIN SCALES - GENERAL: PAINLEVEL: MILD PAIN (2)

## 2018-02-28 NOTE — TELEPHONE ENCOUNTER
Central Prior Authorization Team   Phone: [phone]      Prior Authorization Not Needed per Insurance    Medication: rifaximin 550mg-PA not needed  Insurance Company: [employer] - Phone [phone] Fax [phone]  Expected CoPay: $0.00    Pharmacy Filling the Rx: [facility] [place]  Pharmacy Notified: Yes  Patient Notified: No    Per pharmacy, they were able to process script through insurance with $0 co-pay.

## 2018-02-28 NOTE — MR AVS SNAPSHOT
After Visit Summary   2/28/2018    Dinora Mcghee    MRN: [id number]           Patient Information     Date Of Birth          1965        Visit Information        Provider Department      2/28/2018 11:00 AM Vijaya Genao MD [facility]        Today's Diagnoses     Small intestinal bacterial overgrowth    -  1    History of pancreatectomy          Care Instructions    1. Schedule a meeting with our dietitian for a refresher on Creon dosing.   2. Trial of rifaximin for SIBO.   3. Recommend low gas diet  4. Recommend lifestyle changes to limit aerophagia (no carbonated beverages, no straws, no gum, no hard candy). Eat slowly.   5. Continue your efforts for daily exercise, goal of walking at least 3 miles a day.   6. Continue your Amitiza 3 tabs at night and 2 tabs in the morning.   7. Continue daily senna and colace.   8. Continue bisacodyl as needed if three days without a BM.   9. Continue your efforts to have 5-6 small meals/snacks a day.   10. Limit high fiber and high fat loads to your stomach.   11. Follow-up in GI clinic in 3-4 months with Dr. Genao or ANA Rojas.       Excess Gas  Certain foods produce gas when digested. In some people, these foods make an excessive amount of gas. This may cause bloating, burping, or increased gas passing through the rectum (flatulence).     Foods that cause gas  The following foods are more likely to cause this problem. Limit them, or remove them from your diet:    Broccoli    Cauliflower    [place] sprouts    Cabbage    Cooked dried beans    Fizzy (carbonated) drinks, such as sparkling water, soda, beer, and champagne  Other causes  Other causes of excess gas include:    Eating too fast or talking while you chew. This may cause you to swallow air. This increases the amount of gas in your stomach. And it may make your symptoms worse. Chew each mouthful completely before you swallow. Take your  time.    Chewing on gum or sucking on hard candy. These cause you to swallow more often. And some of what you are swallowing is air. This leads to more gas in your stomach. Avoid chewing gum and hard candy.    Overeating. This may increase the feeling of being bloated and cause more gas. When you are full, stop eating.     Being constipated. This can increase the amount of normal intestinal gas. Avoid constipation by getting more fiber in your diet. Good sources of fiber include whole-grain cereal, fresh vegetables (except those in the above list), and fresh fruits. High-fiber foods absorb water and carry it out of the body. When adding more fiber to your diet, you also need to drink more water. You should drink at least 8, 8-ounce glasses of water (2 quarts) per day.  Date Last Reviewed: 8/1/2016 2000-2017 [employer]. [address] [place] [address]. All rights reserved. This information is not intended as a substitute for professional medical care. Always follow your healthcare professional's instructions.                Follow-ups after your visit        Additional Services     NUTRITION REFERRAL                 Your next 10 appointments already scheduled     Mar 09, 2018 10:00 AM CST   (Arrive by 9:45 AM)   Return Visit with Maria Elena Abdalla,    [facility] ([facility])    [address] [place] [id number]   [phone]            Mar 23, 2018  8:30 AM CDT   (Arrive by 8:15 AM)   Return Visit with Jesse Richardson DO   [facility] ([facility])    [address] [place] [id number]   [phone]            Jun 06, 2018  4:20 PM CDT   (Arrive by 4:05 PM)   Return Visit with Vijaya Genao MD   [facility] ([facility])    [address] [place] [address]  "[facility] [address] [phone]              Who to contact     Please call your clinic at [phone] to:    Ask questions about your health    Make or cancel appointments    Discuss your medicines    Learn about your test results    Speak to your doctor            Additional Information About Your Visit        MyChart Information     [facility] gives you secure access to your electronic health record. If you see a primary care provider, you can also send messages to your care team and make appointments. If you have questions, please call your primary care clinic.  If you do not have a primary care provider, please call [phone] and they will assist you.      [employer] is an electronic gateway that provides easy, online access to your medical records. With [employer], you can request a clinic appointment, read your test results, renew a prescription or communicate with your care team.     To access your existing account, please contact your [facility] or call [phone] for assistance.        Care EveryWhere ID     This is your Care EveryWhere ID. This could be used by other organizations to access your [facility] medical records  [id number]        Your Vitals Were     Pulse Temperature Height Pulse Oximetry BMI (Body Mass Index)       90 98.4  F (36.9  C) (Oral) 1.727 m (5' 8\") 97% 22.38 kg/m2        Blood Pressure from Last 3 Encounters:   02/28/18 111/64   02/21/18 116/70   01/16/18 119/72    Weight from Last 3 Encounters:   02/28/18 66.8 kg (147 lb 3.2 oz)   02/21/18 67.2 kg (148 lb 1.6 oz)   01/16/18 67.6 kg (149 lb 1.6 oz)              We Performed the Following     NUTRITION REFERRAL          Today's Medication Changes          These changes are accurate as of 2/28/18 11:57 AM.  If you have any questions, ask your nurse or doctor.               Start taking these medicines.        Dose/Directions    rifaximin 550 MG Tabs tablet   Commonly known as:  XIFAXAN "   Used for:  Small intestinal bacterial overgrowth   Started by:  Vijaya Genao MD        Dose:  550 mg   Take 1 tablet (550 mg) by mouth 3 times daily for 10 days   Quantity:  30 tablet   Refills:  0         These medicines have changed or have updated prescriptions.        Dose/Directions    levothyroxine 125 MCG tablet   Commonly known as:  SYNTHROID/LEVOTHROID   This may have changed:    - how much to take  - how to take this  - additional instructions   Used for:  Itching, Post-pancreatectomy diabetes (H), History of pancreatectomy, Pancreatic insufficiency        Take 1 tablet (125 mcg), by mouth daily before breakfast.   Quantity:  1 tablet   Refills:  0            Where to get your medicines      These medications were sent to [id number] - [place], [place] - [address]  [address] [facility] [address]     Phone:  [phone]     rifaximin 550 MG Tabs tablet                Primary Care Provider Office Phone # Fax #    Justyna Lawson [phone]       [facility] [address] [facility] [address]  [facility] [address]        Equal Access to Services     [facility]: Hadii aad ku hadasho Soomaali, waaxda luqadaha, qaybta kaalmada adeegyada, waxay idiin haybharati wallace . So [facility] [phone].    ATENCIÓN: Si habla español, tiene a muñoz disposición servicios gratuitos de asistencia lingüística. [facility] [phone].    We comply with applicable federal civil rights laws and [place] laws. We do not discriminate on the basis of race, color, national origin, age, disability, sex, sexual orientation, or gender identity.            Thank you!     Thank you for choosing [facility]  for your care. Our goal is always to provide you with excellent care. Hearing back from our patients is one way we can continue to improve our services. Please take a few minutes to complete the written survey that you may receive in the mail after  your visit with us. Thank you!             Your Updated Medication List - Protect others around you: Learn how to safely use, store and throw away your medicines at www.disposemymeds.org.          This list is accurate as of 2/28/18 11:57 AM.  Always use your most recent med list.                   Brand Name Dispense Instructions for use Diagnosis    acetaminophen 500 MG Caps      Take 2 mg by mouth 2 times daily        * amitriptyline 10 MG tablet    ELAVIL     Take 5 tablets by mouth        * amitriptyline 10 MG tablet    ELAVIL    150 tablet    Take 5 tablets (50 mg) by mouth At Bedtime    Headache disorder       * amitriptyline 50 MG tablet    ELAVIL    60 tablet    Take 1 tablet (50 mg) by mouth At Bedtime    Headache disorder       * amitriptyline 10 MG tablet    ELAVIL    60 tablet    Take 1 tablet (10 mg) by mouth At Bedtime    Headache disorder       amylase-lipase-protease [address]288-96855 UNITS Cpep per EC capsule    CREON 24    450 capsule    Take 3-4 capsules by mouth with meals and 1-2 with snacks. Maximum 15 capsules per day.    Acquired total absence of pancreas       aspirin 81 MG EC tablet     90 tablet    Take 1 tablet (81 mg) by mouth daily    High platelet count (H)       azelastine 0.1 % spray    ASTELIN     Spray 1 spray into both nostrils 2 times daily        BD SHARPS  Misc     1 each    1 Container as needed    Post-pancreatectomy diabetes (H)       blood glucose monitoring lancets     1 Box    Use to test blood sugar 6-8 times daily or as directed.    Acute on chronic pancreatitis (H)       blood glucose monitoring test strip    PAT CONTOUR NEXT    2 Box    Use to test blood sugar 6 times daily or as directed.    Post-pancreatectomy diabetes (H)       celecoxib 100 MG capsule    celeBREX    60 capsule    Take 1 capsule (100 mg) by mouth 2 times daily    Chronic abdominal pain       cetirizine 10 MG tablet    zyrTEC     Take 10 mg by mouth daily    Elevated liver enzymes        CONTOUR NEXT EZ MONITOR W/DEVICE Kit      1 Device 6 times daily    Itching, Post-pancreatectomy diabetes (H), History of pancreatectomy, Pancreatic insufficiency       diphenhydrAMINE 25 MG capsule    BENADRYL ALLERGY    56 capsule    Take 1 capsule (25 mg) by mouth every 6 hours as needed for itching or allergies    Itching, Post-pancreatectomy diabetes (H), History of pancreatectomy, Pancreatic insufficiency       docusate sodium 100 MG capsule    COLACE    60 capsule    Take 1 capsule (100 mg) by mouth 2 times daily as needed for constipation,    Itching, Post-pancreatectomy diabetes (H), History of pancreatectomy, Pancreatic insufficiency       glucagon 1 MG kit    GLUCAGON EMERGENCY    1 mg    Inject 1 mg into the muscle once for 1 dose    Post-pancreatectomy diabetes (H)       glucose 40 % Gel gel     3 Tube    Take 15-30 g by mouth every 15 minutes as needed for low blood sugar    Acquired total absence of pancreas       Injection Device for insulin Tika    NOVOPEN ECHO    1 each    1 each daily as needed    Post-pancreatectomy diabetes (H)       insulin aspart 100 UNIT/ML injection    NovoLOG PENFILL    3 mL    Administer subcutaneously 0.5 unit per 45 grams of carbohydrates.    Post-pancreatectomy diabetes (H)       insulin pen needle 32G X 4 MM    BD KEN U/F    100 each    Use 6-8 times per day    Acquired total absence of pancreas       LANsoprazole 30 MG CR capsule    PREVACID    30 capsule    Take 1 capsule (30 mg) by mouth daily Take 30-60 minutes before a meal.    Post-pancreatectomy diabetes (H), Pancreatic insufficiency       levothyroxine 125 MCG tablet    SYNTHROID/LEVOTHROID    1 tablet    Take 1 tablet (125 mcg), by mouth daily before breakfast.    Itching, Post-pancreatectomy diabetes (H), History of pancreatectomy, Pancreatic insufficiency       lubiprostone 24 MCG capsule    AMITIZA     Take 2-3 capsules by mouth        multivitamin CF formula capsule    CHOICEFUL     Take 1 tablet by mouth  daily    Itching, Post-pancreatectomy diabetes (H), History of pancreatectomy, Pancreatic insufficiency       order for DME     1 Device    Equipment being ordered:Cefaly    Headache disorder       prochlorperazine 5 MG tablet    COMPAZINE    90 tablet    Take two 5 mg tablets by mouth every six hours as needed for nausea.    Itching, Post-pancreatectomy diabetes (H), History of pancreatectomy, Pancreatic insufficiency       rifaximin 550 MG Tabs tablet    XIFAXAN    30 tablet    Take 1 tablet (550 mg) by mouth 3 times daily for 10 days    Small intestinal bacterial overgrowth       senna-docusate 8.6-50 MG per tablet    SENOKOT-S;PERICOLACE    100 tablet    Take 1 tablet by mouth 2 times daily as needed for constipation    Itching, Post-pancreatectomy diabetes (H), History of pancreatectomy, Pancreatic insufficiency       VAGIFEM 10 MCG Tabs vaginal tablet   Generic drug:  estradiol           ZOMIG 5 MG tablet   Generic drug:  ZOLMitriptan      Take 5 mg by mouth at onset of headache for migraine        * Notice:  This list has 4 medication(s) that are the same as other medications prescribed for you. Read the directions carefully, and ask your doctor or other care provider to review them with you.

## 2018-02-28 NOTE — PATIENT INSTRUCTIONS
1. Schedule a meeting with our dietitian for a refresher on Creon dosing.   2. Trial of rifaximin for SIBO.   3. Recommend low gas diet  4. Recommend lifestyle changes to limit aerophagia (no carbonated beverages, no straws, no gum, no hard candy). Eat slowly.   5. Continue your efforts for daily exercise, goal of walking at least 3 miles a day.   6. Continue your Amitiza 3 tabs at night and 2 tabs in the morning.   7. Continue daily senna and colace.   8. Continue bisacodyl as needed if three days without a BM.   9. Continue your efforts to have 5-6 small meals/snacks a day.   10. Limit high fiber and high fat loads to your stomach.   11. Follow-up in GI clinic in 3-4 months with Dr. Genao or ANA Rojas.       Excess Gas  Certain foods produce gas when digested. In some people, these foods make an excessive amount of gas. This may cause bloating, burping, or increased gas passing through the rectum (flatulence).     Foods that cause gas  The following foods are more likely to cause this problem. Limit them, or remove them from your diet:    Broccoli    Cauliflower    [place] sprouts    Cabbage    Cooked dried beans    Fizzy (carbonated) drinks, such as sparkling water, soda, beer, and champagne  Other causes  Other causes of excess gas include:    Eating too fast or talking while you chew. This may cause you to swallow air. This increases the amount of gas in your stomach. And it may make your symptoms worse. Chew each mouthful completely before you swallow. Take your time.    Chewing on gum or sucking on hard candy. These cause you to swallow more often. And some of what you are swallowing is air. This leads to more gas in your stomach. Avoid chewing gum and hard candy.    Overeating. This may increase the feeling of being bloated and cause more gas. When you are full, stop eating.     Being constipated. This can increase the amount of normal intestinal gas. Avoid constipation by getting more fiber in  your diet. Good sources of fiber include whole-grain cereal, fresh vegetables (except those in the above list), and fresh fruits. High-fiber foods absorb water and carry it out of the body. When adding more fiber to your diet, you also need to drink more water. You should drink at least 8, 8-ounce glasses of water (2 quarts) per day.  Date Last Reviewed: 8/1/2016 2000-2017 [employer]. [address] [place], [place] [address]. All rights reserved. This information is not intended as a substitute for professional medical care. Always follow your healthcare professional's instructions.

## 2018-02-28 NOTE — NURSING NOTE
"Chief Complaint   Patient presents with     Clinic Care Coordination - Follow-up     return       Vitals:    02/28/18 1059   BP: 111/64   Pulse: 90   Temp: 98.4  F (36.9  C)   TempSrc: Oral   SpO2: 97%   Weight: 147 lb 3.2 oz   Height: 5' 8\"       Body mass index is 22.38 kg/(m^2).    Maria Elena GARCIA LPN                          "

## 2018-02-28 NOTE — PROGRESS NOTES
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 Still with some bringing up of liquid (refluxant vs vomiting) with overfilling of stomach - discussed some dietary modifications   Bowel pattern evening out - far less constipation days, though still with periods of loose stools. May need to consider extra Creon with certain meals. Will re-refer to GI dietitian to address her many questions.     Increased bloating, flatus, and continued periods of loose stools --> rifaximin for SIBO (reaction to previously tried abx). Will also institute some other interventions to limit luminal gas distention and related discomfort.   Plan:   1. Schedule a meeting with our dietitian for a refresher on Creon dosing.   2. Trial of rifaximin for SIBO.   3. Recommend low gas diet  4. Recommend lifestyle changes to limit aerophagia (no carbonated beverages, no straws, no gum, no hard candy). Eat slowly.   5. Continue your efforts for daily exercise, goal of walking at least 3 miles a day.   6. Continue your Amitiza 3 tabs at night and 2 tabs in the morning.   7. Continue daily senna and colace.   8. Continue bisacodyl as needed if three days without a BM.   9. Continue your efforts to have 5-6 small meals/snacks a day.   10. Limit high fiber and high fat loads to your stomach.   11. Follow-up in GI clinic in 3-4 months with Dr. Genao or ANA Rojas       It was a pleasure to participate in the care of this patient; please contact us with any further questions.  A total of 45 face-to-face minutes was spent with this patient, >50% of which was counseling regarding the above delineated issues.    Vijaya Genao MD  Assistant  "Professor of Medicine  Division of Gastroenterology, Hepatology and Nutrition  [facility]    ---------------------------------------------------------------------------------------------------  HPI:  Ms. 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 She returns today for follow-up.     Overall she feels she has been doing well. Her primary concern today is abdominal bloating (see Bloating below).       1. Abdominal pain  Taken/summarized from my prior documentation:    Ms. Loo had issues with severe daily abdominal pain prior to her TPIAT. Overall she felt she was doing well after surgery and was able to be weaned off of narcotics though she states she still follows in the pain clinic. Three or four times since April 2017 (about once every other month), she has experienced pronounced abdominal pain similar in nature to her prior chronic pancreatic pain. The pain is localized in the epigastric region and just under her right 12th rib; the pain does not radiate beyond that point and is described as \"sharp.\" It did wake her from sleep on one occasion prompting an ED visit on 5/20/17. At that time pain was reported in the LUQ and she was seen to on CT have left abdominal wall fat stranding but with no associated cellulitis or intra-abdominal pathology. An MR abd done a month prior demonstrated a subQ fluid collection in this area. She was discharged to home from the ED. An MRI done thereafter in July 2017 did not show persistance of this finding and heterogenous " "area of enhancement in the dome of the liver (initially seen on 5/20/17 CT) was improved.    She expresses much concern that this is a recurrence of her prior pancreas pain, though she feels it shouldn't happen since \"my pancreas is gone.\" She shares that she has been working with her psychologist in pain clinic on mind-body interventions; they have apparently discussed that this could be a reactivation of prior pain pathways. She cannot provide much more in the way of details of the pain during episodes, what she'd been doing, eating, or how it related to her stool pattern.        Ms. Loo also notes a much less pronounced pain which comes on in the same area, but only with intense exercise. This seems to get better with stopping or walking and has not occurred at other times.     Today,  Ms. Loo shares she hasn't had any recent episodes of her \"deep\" pain which she equates with her prior pancreas pain. She finds this does improve with breathing exercises and relaxation techniques that she's been working on.      2. Gastroparesis  3. Nausea, vomiting  Taken/summarized from my prior documentation:    GES pursued in the setting of chronic nausea and vomiting during her chronic pancreatitis and TPIAT evaluation. She states she had two-hour study at [facility] that may have been consistent with gastroparesis. A follow-up four-hour study was done here on 6/27/16 with 49% seen remaining at 4 hours. Ms. Loo is not sure if she was on narcotics at that time. Per her recollection she didn't start daily pain medications until Fall 2016, but she did use them intermittently prior to that time. A G-J tube was placed in fall 2016 with G-tube used for venting and jejunal extension for tube feeds. After her TPIAT she was able to wean off her TFs and felt she no longer required venting. By spring 2017 she was tolerating a regular diet with no nausea or vomiting and in March 2017 her G-J tube was able to be removed.  " "Throughout the spring and summer she was on a regular diet and had no bloating, nausea, or vomiting.     Prior to her initial GI consultation she developed \"severe\" migraine HAs with associated vomiting. She describes 12 hours of emesis and retching with 12+ hours of HA pain similar to her prior migraines. Since then has had frequent HAs with both prolonged migraines (lasting 3 days per report) and frequent migraines (up to 3-5 days out of a week).    She is on chronic amitriptyline for migraine prophylaxis. She has had previously used sumatriptan for migraines, but this last efficacy and some time ago she was switched to zolmitriptan. Unfortunately she does not feel the zolmitriptan is working any longer. She had not seen a neurologist for several years. For her nausea and vomiting she uses scopolamine patch, procholorperazine, or promethazine all with equal effect. She likes the scopolamine but limits it use due to side effects. We did refer her to neurology for additional evaluation.     Today, Ms. Loo feels this is markedly improved. She states she's only vomited 6 times in the last four months (since her last GI visit). Of these episode at least a few were noted that after large liquid ingestions where she will feel the need to burp and will bring up some of the liquid with eructation.     She was able to see a neurologist recently who recommended a dose increase in her amitriptyline. Ms. Loo feels her migraines have decreased from 10-12 each month to 4-5 in a month.     4. Constipation  5. Bloating  Taken/summarized from my prior documentation:   Ms. Loo reports that she's had constipation ongoing for years which began after she had her first child (20+ years ago). She denies any issues with her bowels as an infant or child, and reports her baseline pattern was moving her bowels every day to every other day up until her pregnancy.      She had a bowel cleanout around the time her symptoms began and " "has been on Miralax intermittently since then. She began more regular senna use around 2015/2016 and then colace was started a couple months before her TPIAT (2016, in the setting of increased pain medication dosing). She has intermittently done bowel cleanouts through her primary care clinic or as recommended by Dr. Phoenix; these cleanouts were generally with Miralax/Gatorade prep and she reported good results.  Post-TPIAT she did require intermittent enemas and suppositories while still on opiates but has not continued these medications.      By Nov 2017 she was on senna twice daily and colace twice daily since her TPIAT. She had been on Milk of Mg BID and Miralax as needed, but was still experiencing constipation. A few months after her surgery she was started on Linzess. She reported severed diarrhea after 1-2 doses and thus discontinued the medication. She was then placed on Amitiza, ultimately titrating to 24 mcg in morning and night. Intially she was having BMs daily, but has since had longer periods of time without BMs.     When we first met,  her pattern was quite variable with she treated with varying her Amitiza dose which we felt was perpetuating the pattern (please see initial GI consultation Nov 2017 for further details). We worked on a steady Amitiza dose with decreasing amounts of senna and colace on days of looser stools. Her Creon dose has remained stable at about 15 capsules each day, with no noted steatorrhea.       Since her last GI clinic visit, Ms. Loo feels her pattern has improved with fewer \"loose\" days and fewer \"constipated\" days. She is taking Amitiza 3 tabs, 1 senna, and 1 colace before bed. In the morning she takes another senna and colace. Currently she stools 6 days each week. Generally she will have a morning cluster of 2-3 stools, [place] 3-4. She may have some urgency, but no fecal incontinence. She may have another stool after exercising. She has only required bisacodyl a few " "times for \"constipated\" days/unsatisfactory BMs.     About 2-3 days each month she notes looser stools. Stools are passed as a few clusters during the day, [place] 5-6 with urgency. The stools are \"floaty\" but she hasn't noted any grease in the toilet bowl. She does have increased flatus. She has not tried increasing her Creon on these days and expresses a lot of concern about adjusting Creon dosing. She also shares concern about bloating discomfort with increased belching and flatus. This bloating does improve after passing flatus or stooling. Through Dr. Phoenix in pancreas txplt clinic, Ms. Loo was given metronidazole for SIBO to see if this led to any improvement. Unfortunately, Ms. Loo developed a \"severe rash\" while on the metronidazole. This resolved shortly after the medication was discontinued.        6. GERD  7. Dysphagia  Taken/summarized from my prior documentation:    Her GERD is experienced as substernal and throat burning as well as bringing up of nocturnal refluxant (would awaken choking) and refluxant with bending over. This was worked up by several GI providers and, most recently, she was seen by Dr. George Flores here at [facility]. She reports a prior Schatzki's ring which required dilation in the past. Esophageal manometry done here was normal; pH impedence revealed a [place] of 24 with positive symptom association.  Recommendations at her most recent esophageal clinic visit in Jan 2016 were for BID PPI with consideration of BID H2 blocker if still symptomatic. A trial of carafate did not improve her GERD. Ms. Loo states that pursuit of Linx procedure was discussed with Dr. George Flores. She has done well with daily lansoprazole and lifestyle/behavioural changes and thus has not pursued it yet.     Today, she continues to feel that her GERD is under good control. She states its \"fine\" with no marked changes and no current issues with dysphagia.         ROS:    Please see bottom of this " note.    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  Document previously scanned on 1/12/17.  Validation form completed and scanned.  Code Status reflects choices in most recent ACP document..  Confirmed/documented designated decision maker(s).  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     Priority: Medium     Acute on chronic pancreatitis (H) 09/03/2016     Priority: Medium     Acute abdominal pain 08/02/2016     Priority: Medium     Abdominal pain, epigastric 10/23/2015     Priority: Medium     Severe protein-calorie malnutrition (H) 06/09/2015     Priority: Medium     Acute pancreatitis 06/03/2015     Priority: Medium     Abdominal pain 06/02/2015     Priority: Medium     S/P ERCP 06/02/2015     Priority: Medium     Abdominal pain, generalized 04/20/2015     Priority: Medium     History of ERCP 04/20/2015     Priority: Medium     Other type of intractable migraine      Priority: Medium     Diagnosis updated by automated process. Provider to review and confirm.       GERD (gastroesophageal reflux disease) 12/01/2010     Priority: Medium     Lumbago 04/18/2005     Priority: Medium     History of L5-S1 degenerative disk disease.         Sprain and strain of other specified sites of hip and thigh  12/09/2002     Priority: Medium     Chondromalacia of patella 12/09/2002     Priority: Medium     Need for prophylactic immunotherapy      Priority: Medium     trees, grass, srw, dust mites, cat       Allergic rhinitis due to other allergen 12/21/2001     Priority: Medium     Hypothyroidism      Priority: Medium     Problem list name updated by automated process. Provider to review       Sensorineural hearing loss 01/10/2005     Priority: Medium     Problem list name updated by automated process. Provider to review       Vertiginous syndrome and labyrinthine disorder 01/10/2005     Priority: Medium     Problem list name updated by automated process. Provider to review         PERTINENT MEDICATIONS:  Current Outpatient Prescriptions   Medication     amitriptyline (ELAVIL) 10 MG tablet     lubiprostone (AMITIZA) 24 MCG capsule     amitriptyline (ELAVIL) 50 MG tablet     amitriptyline (ELAVIL) 10 MG tablet     order for DME     amitriptyline (ELAVIL) 10 MG tablet     ZOLMitriptan (ZOMIG) 5 MG tablet     azelastine (ASTELIN) 0.1 % spray     acetaminophen 500 MG CAPS     LANsoprazole (PREVACID) 30 MG CR capsule     celecoxib (CELEBREX) 100 MG capsule     estradiol (VAGIFEM) 10 MCG TABS vaginal tablet     blood glucose monitoring (PAT CONTOUR NEXT) test strip     amylase-lipase-protease (CREON 24) 70144 UNITS CPEP per EC capsule     insulin pen needle (BD KEN U/F) 32G X 4 MM     cetirizine (ZYRTEC) 10 MG tablet     insulin aspart (NOVOLOG PENFILL) 100 UNIT/ML injection     senna-docusate (SENOKOT-S;PERICOLACE) 8.6-50 MG per tablet     prochlorperazine (COMPAZINE) 5 MG tablet     diphenhydrAMINE (BENADRYL ALLERGY) 25 MG capsule     levothyroxine (SYNTHROID/LEVOTHROID) 125 MCG tablet     docusate sodium (COLACE) 100 MG capsule     multivitamin CF formula (CHOICEFUL) capsule     Blood Glucose Monitoring Suppl (CONTOUR NEXT EZ MONITOR) W/DEVICE KIT     aspirin 81 MG EC tablet     Injection Device for insulin (NOVOPEN ECHO)  "MARCO     glucose 40 % GEL gel     Sharps Container (BD SHARPS ) MISC     blood glucose monitoring (PAT MICROLET) lancets     glucagon (GLUCAGON EMERGENCY) 1 MG kit     No current facility-administered medications for this visit.          PHYSICAL EXAMINATION:  Vitals /64  Pulse 90  Temp 98.4  F (36.9  C) (Oral)  Ht 1.727 m (5' 8\")  Wt 66.8 kg (147 lb 3.2 oz)  SpO2 97%  BMI 22.38 kg/m2   Wt   Wt Readings from Last 2 Encounters:   02/28/18 66.8 kg (147 lb 3.2 oz)   02/21/18 67.2 kg (148 lb 1.6 oz)   Gen: Pt sitting up in NAD, interactive and cooperative on exam  Eyes: sclerae anicteric, no injection  ENT: MMM  Cardiac: RRR, nl S1, S2  Resp: Clear on anterior exam  GI: Normoactive BS, abd soft,nontender  Skin: Warm, dry, no jaundice  Neuro: alert, oriented, answers questions appropriately      PERTINENT STUDIES:    Orders Only on 01/16/2018   Component Date Value Ref Range Status     Glucose 01/16/2018 97  70 - 99 mg/dL Final     C Peptide 01/16/2018 1.1  0.9 - 6.9 ng/mL Final     Glucose 01/16/2018 134* 70 - 99 mg/dL Final     C Peptide 01/16/2018 4.3  0.9 - 6.9 ng/mL Final     Glucose 01/16/2018 79  70 - 99 mg/dL Final     C Peptide 01/16/2018 2.3  0.9 - 6.9 ng/mL Final     Answers for HPI/ROS submitted by the patient on 2/20/2018   General Symptoms: Yes  Skin Symptoms: No  HENT Symptoms: Yes  EYE SYMPTOMS: Yes  HEART SYMPTOMS: No  LUNG SYMPTOMS: No  INTESTINAL SYMPTOMS: Yes  URINARY SYMPTOMS: No  GYNECOLOGIC SYMPTOMS: No  BREAST SYMPTOMS: No  SKELETAL SYMPTOMS: No  BLOOD SYMPTOMS: No  NERVOUS SYSTEM SYMPTOMS: Yes  MENTAL HEALTH SYMPTOMS: Yes  Fever: No  Loss of appetite: No  Weight loss: Yes  Weight gain: No  Fatigue: Yes  Night sweats: Yes  Chills: No  Increased stress: Yes  Excessive hunger: No  Excessive thirst: No  Feeling hot or cold when others believe the temperature is normal: No  Loss of height: No  Post-operative complications: No  Surgical site pain: No  Hallucinations: No  Change " in or Loss of Energy: No  Hyperactivity: No  Confusion: No  Ear pain: Yes  Ear discharge: No  Hearing loss: No  Tinnitus: Yes  Nosebleeds: No  Congestion: Yes  Sinus pain: Yes  Trouble swallowing: No   Voice hoarseness: No  Mouth sores: No  Sore throat: No  Tooth pain: No  Gum tenderness: No  Bleeding gums: No  Change in taste: No  Change in sense of smell: No  Dry mouth: No  Hearing aid used: No  Neck lump: No  Eye pain: Yes  Vision loss: No  Dry eyes: Yes  Watery eyes: Yes  Eye bulging: No  Double vision: No  Flashing of lights: Yes  Spots: Yes  Floaters: No  Redness: Yes  Crossed eyes: No  Tunnel Vision: No  Yellowing of eyes: No  Eye irritation: Yes  Heart burn or indigestion: No  Nausea: Yes  Vomiting: Yes  Abdominal pain: Yes  Bloating: Yes  Constipation: Yes  Diarrhea: Yes  Blood in stool: No  Black stools: No  Rectal or Anal pain: No  Fecal incontinence: No  Yellowing of skin or eyes: No  Vomit with blood: No  Change in stools: Yes  Trouble with coordination: No  Dizziness or trouble with balance: No  Fainting or black-out spells: No  Memory loss: No  Headache: Yes  Seizures: No  Speech problems: No  Tingling: Yes  Tremor: No  Weakness: No  Difficulty walking: No  Paralysis: No  Numbness: No  Nervous or Anxious: Yes  Depression: Yes  Trouble sleeping: Yes  Trouble thinking or concentrating: Yes  Mood changes: Yes  Panic attacks: No

## 2018-02-28 NOTE — TELEPHONE ENCOUNTER
Prior Authorization Specialty Medication Request    Medication/Dose: rifaximin   Diagnosis and ICD: SIBO  New/Renewal/Insurance Change PA: NEW    Important Lab Values:     Previously Tried and Failed Therapies: Allergies to Flagyl, bactrim, azithromycin,zosyn    Rationale: LASHAY    Would you like to include any research articles?    If yes please include the hyperlink(s) below or fax @ [phone].    (Include Name and MRN)    If you received a fax notification from an outside Pharmacy;

## 2018-02-28 NOTE — LETTER
2/28/2018       RE: Dinora Mcghee  [address] [facility] [address]     Dear Colleague,    Thank you for referring your patient, Dinora Mcghee, to the [facility] GASTROENTEROLOGY AND IBD CLINIC at [facility]. Please see a copy of my visit note below.    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 Still with some bringing up of liquid (refluxant vs vomiting) with overfilling of stomach - discussed some dietary modifications   Bowel pattern evening out - far less constipation days, though still with periods of loose stools. May need to consider extra Creon with certain meals. Will re-refer to GI dietitian to address her many questions.     Increased bloating, flatus, and continued periods of loose stools --> rifaximin for SIBO (reaction to previously tried abx). Will also institute some other interventions to limit luminal gas distention and related discomfort.   Plan:   1. Schedule a meeting with our dietitian for a refresher on Creon dosing.   2. Trial of rifaximin for SIBO.   3. Recommend low gas diet  4. Recommend lifestyle changes to limit aerophagia (no carbonated beverages, no straws, no gum, no hard candy). Eat slowly.   5. Continue your efforts for daily exercise, goal of walking at least 3 miles a day.   6. Continue your Amitiza 3 tabs at night and 2 tabs in the morning.   7. Continue daily senna and colace.   8. Continue bisacodyl as needed if three days without a BM.   9. Continue your efforts to have 5-6 small meals/snacks a day.   10. Limit high fiber and high fat loads to your stomach.   11. Follow-up in GI clinic in 3-4 months with Dr. Genao  "or ANA Rojas       It was a pleasure to participate in the care of this patient; please contact us with any further questions.  A total of 45 face-to-face minutes was spent with this patient, >50% of which was counseling regarding the above delineated issues.    Vijaya Genao MD   of Medicine  Division of Gastroenterology, Hepatology and Nutrition  [facility]    ---------------------------------------------------------------------------------------------------  HPI:  Ms. 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 She returns today for follow-up.     Overall she feels she has been doing well. Her primary concern today is abdominal bloating (see Bloating below).       1. Abdominal pain  Taken/summarized from my prior documentation:    Ms. Loo had issues with severe daily abdominal pain prior to her TPIAT. Overall she felt she was doing well after surgery and was able to be weaned off of narcotics though she states she still follows in the pain clinic. Three or four times since April 2017 (about once every other month), she has experienced pronounced abdominal pain similar in nature to her prior chronic pancreatic pain. The pain is localized in the epigastric region and just under her right 12th rib; the pain does not radiate beyond that point and is described as \"sharp.\" It did wake her from sleep on one occasion prompting an ED visit on 5/20/17. At that time pain was reported in the LUQ and she was seen to on CT have left " "abdominal wall fat stranding but with no associated cellulitis or intra-abdominal pathology. An MR abd done a month prior demonstrated a subQ fluid collection in this area. She was discharged to home from the ED. An MRI done thereafter in July 2017 did not show persistance of this finding and heterogenous area of enhancement in the dome of the liver (initially seen on 5/20/17 CT) was improved.    She expresses much concern that this is a recurrence of her prior pancreas pain, though she feels it shouldn't happen since \"my pancreas is gone.\" She shares that she has been working with her psychologist in pain clinic on mind-body interventions; they have apparently discussed that this could be a reactivation of prior pain pathways. She cannot provide much more in the way of details of the pain during episodes, what she'd been doing, eating, or how it related to her stool pattern.        Ms. Loo also notes a much less pronounced pain which comes on in the same area, but only with intense exercise. This seems to get better with stopping or walking and has not occurred at other times.     Today,  Ms. Loo shares she hasn't had any recent episodes of her \"deep\" pain which she equates with her prior pancreas pain. She finds this does improve with breathing exercises and relaxation techniques that she's been working on.      2. Gastroparesis  3. Nausea, vomiting  Taken/summarized from my prior documentation:    GES pursued in the setting of chronic nausea and vomiting during her chronic pancreatitis and TPIAT evaluation. She states she had two-hour study at [facility] that may have been consistent with gastroparesis. A follow-up four-hour study was done here on 6/27/16 with 49% seen remaining at 4 hours. Ms. Loo is not sure if she was on narcotics at that time. Per her recollection she didn't start daily pain medications until Fall 2016, but she did use them intermittently prior to that time. A G-J tube was placed " "in fall 2016 with G-tube used for venting and jejunal extension for tube feeds. After her TPIAT she was able to wean off her TFs and felt she no longer required venting. By spring 2017 she was tolerating a regular diet with no nausea or vomiting and in March 2017 her G-J tube was able to be removed.  Throughout the spring and summer she was on a regular diet and had no bloating, nausea, or vomiting.      Prior to her initial GI consultation she developed \"severe\" migraine HAs with associated vomiting. She describes 12 hours of emesis and retching with 12+ hours of HA pain similar to her prior migraines. Since then has had frequent HAs with both prolonged migraines (lasting 3 days per report) and frequent migraines (up to 3-5 days out of a week).    She is on chronic amitriptyline for migraine prophylaxis. She has had previously used sumatriptan for migraines, but this last efficacy and some time ago she was switched to zolmitriptan. Unfortunately she does not feel the zolmitriptan is working any longer. She had not seen a neurologist for several years. For her nausea and vomiting she uses scopolamine patch, procholorperazine, or promethazine all with equal effect. She likes the scopolamine but limits it use due to side effects. We did refer her to neurology for additional evaluation.     Today, Ms. Loo feels this is markedly improved. She states she's only vomited 6 times in the last four months (since her last GI visit). Of these episode at least a few were noted that after large liquid ingestions where she will feel the need to burp and will bring up some of the liquid with eructation.     She was able to see a neurologist recently who recommended a dose increase in her amitriptyline. Ms. Loo feels her migraines have decreased from 10-12 each month to 4-5 in a month.     4. Constipation  5. Bloating  Taken/summarized from my prior documentation:   Ms. Loo reports that she's had constipation ongoing for " "years which began after she had her first child (20+ years ago). She denies any issues with her bowels as an infant or child, and reports her baseline pattern was moving her bowels every day to every other day up until her pregnancy.      She had a bowel cleanout around the time her symptoms began and has been on Miralax intermittently since then. She began more regular senna use around 2015/2016 and then colace was started a couple months before her TPIAT (2016, in the setting of increased pain medication dosing). She has intermittently done bowel cleanouts through her primary care clinic or as recommended by Dr. Phoenix; these cleanouts were generally with Miralax/Gatorade prep and she reported good results.  Post-TPIAT she did require intermittent enemas and suppositories while still on opiates but has not continued these medications.       By Nov 2017 she was on senna twice daily and colace twice daily since her TPIAT. She had been on Milk of Mg BID and Miralax as needed, but was still experiencing constipation. A few months after her surgery she was started on Linzess. She reported severed diarrhea after 1-2 doses and thus discontinued the medication. She was then placed on Amitiza, ultimately titrating to 24 mcg in morning and night. Intially she was having BMs daily, but has since had longer periods of time without BMs.     When we first met,  h er pattern was quite variable with she treated with varying her Amitiza dose which we felt was perpetuating the pattern (please see initial GI consultation Nov 2017 for further details). We worked on a steady Amitiza dose with decreasing amounts of senna and colace on days of looser stools. Her Creon dose has remained stable at about 15 capsules each day, with no noted steatorrhea.       Since her last GI clinic visit, Ms. Loo feels her pattern has improved with fewer \"loose\" days and fewer \"constipated\" days. She is taking Amitiza 3 tabs, 1 senna, and 1 colace " "before bed. In the morning she takes another senna and colace. Currently she stools 6 days each week. Generally she will have a morning cluster of 2-3 stools, [place] 3-4. She may have some urgency, but no fecal incontinence. She may have another stool after exercising. She has only required bisacodyl a few times for \"constipated\" days/unsatisfactory BMs.     About 2-3 days each month she notes looser stools. Stools are passed as a few clusters during the day, [place] 5-6 with urgency. The stools are \"floaty\" but she hasn't noted any grease in the toilet bowl. She does have increased flatus. She has not tried increasing her Creon on these days and expresses a lot of concern about adjusting Creon dosing. She also shares concern about bloating discomfort with increased belching and flatus. This bloating does improve after passing flatus or stooling. Through Dr. Phoenix in pancreas txplt clinic, Ms. Loo was given metronidazole for SIBO to see if this led to any improvement. Unfortunately, Ms. Loo developed a \"severe rash\" while on the metronidazole. This resolved shortly after the medication was discontinued.        6. GERD  7. Dysphagia  Taken/summarized from my prior documentation:    Her GERD is experienced as substernal and throat burning as well as bringing up of nocturnal refluxant (would awaken choking) and refluxant with bending over. This was worked up by several GI providers and, most recently, she was seen by Dr. George Flores here at [facility]. She reports a prior Schatzki's ring which required dilation in the past. Esophageal manometry done here was normal; pH impedence revealed a [place] of 24 with positive symptom association.  Recommendations at her most recent esophageal clinic visit in Jan 2016 were for BID PPI with consideration of BID H2 blocker if still symptomatic. A trial of carafate did not improve her GERD. Ms. Loo states that pursuit of Linx procedure was discussed with Dr. George Flores. " "She has done well with daily lansoprazole and lifestyle/behavioural changes and thus has not pursued it yet.     Today, she continues to feel that her GERD is under good control. She states its \"fine\" with no marked changes and no current issues with dysphagia.         ROS:    Please see bottom of this note.    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  Document previously scanned on 1/12/17.  Validation form completed and scanned.  Code Status reflects choices in most recent ACP document..  Confirmed/documented designated decision maker(s).  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     Priority: Medium     Acute on chronic pancreatitis (H) 09/03/2016     Priority: Medium     Acute abdominal pain 08/02/2016     Priority: Medium     Abdominal pain, epigastric 10/23/2015     Priority: Medium     Severe protein-calorie malnutrition (H) 06/09/2015     Priority: Medium     Acute pancreatitis 06/03/2015     Priority: Medium     Abdominal pain 06/02/2015     Priority: Medium     S/P ERCP 06/02/2015     Priority: Medium     Abdominal pain, generalized 04/20/2015     Priority: Medium     History of ERCP 04/20/2015     Priority: Medium     Other type of intractable migraine      Priority: Medium     Diagnosis " updated by automated process. Provider to review and confirm.       GERD (gastroesophageal reflux disease) 12/01/2010     Priority: Medium     Lumbago 04/18/2005     Priority: Medium     History of L5-S1 degenerative disk disease.         Sprain and strain of other specified sites of hip and thigh 12/09/2002     Priority: Medium     Chondromalacia of patella 12/09/2002     Priority: Medium     Need for prophylactic immunotherapy      Priority: Medium     trees, grass, srw, dust mites, cat       Allergic rhinitis due to other allergen 12/21/2001     Priority: Medium     Hypothyroidism      Priority: Medium     Problem list name updated by automated process. Provider to review       Sensorineural hearing loss 01/10/2005     Priority: Medium     Problem list name updated by automated process. Provider to review       Vertiginous syndrome and labyrinthine disorder 01/10/2005     Priority: Medium     Problem list name updated by automated process. Provider to review         PERTINENT MEDICATIONS:  Current Outpatient Prescriptions   Medication     amitriptyline (ELAVIL) 10 MG tablet     lubiprostone (AMITIZA) 24 MCG capsule     amitriptyline (ELAVIL) 50 MG tablet     amitriptyline (ELAVIL) 10 MG tablet     order for DME     amitriptyline (ELAVIL) 10 MG tablet     ZOLMitriptan (ZOMIG) 5 MG tablet     azelastine (ASTELIN) 0.1 % spray     acetaminophen 500 MG CAPS     LANsoprazole (PREVACID) 30 MG CR capsule     celecoxib (CELEBREX) 100 MG capsule     estradiol (VAGIFEM) 10 MCG TABS vaginal tablet     blood glucose monitoring (PAT CONTOUR NEXT) test strip     amylase-lipase-protease (CREON 24) 93344 UNITS CPEP per EC capsule     insulin pen needle (BD KEN U/F) 32G X 4 MM     cetirizine (ZYRTEC) 10 MG tablet     insulin aspart (NOVOLOG PENFILL) 100 UNIT/ML injection     senna-docusate (SENOKOT-S;PERICOLACE) 8.6-50 MG per tablet     prochlorperazine (COMPAZINE) 5 MG tablet     diphenhydrAMINE (BENADRYL ALLERGY) 25 MG  "capsule     levothyroxine (SYNTHROID/LEVOTHROID) 125 MCG tablet     docusate sodium (COLACE) 100 MG capsule     multivitamin CF formula (CHOICEFUL) capsule     Blood Glucose Monitoring Suppl (CONTOUR NEXT EZ MONITOR) W/DEVICE KIT     aspirin 81 MG EC tablet     Injection Device for insulin (NOVOPEN ECHO) MARCO     glucose 40 % GEL gel     Sharps Container (BD SHARPS ) MISC     blood glucose monitoring (PAT MICROLET) lancets     glucagon (GLUCAGON EMERGENCY) 1 MG kit     No current facility-administered medications for this visit.          PHYSICAL EXAMINATION:  Vitals /64  Pulse 90  Temp 98.4  F (36.9  C) (Oral)  Ht 1.727 m (5' 8\")  Wt 66.8 kg (147 lb 3.2 oz)  SpO2 97%  BMI 22.38 kg/m2   Wt   Wt Readings from Last 2 Encounters:   02/28/18 66.8 kg (147 lb 3.2 oz)   02/21/18 67.2 kg (148 lb 1.6 oz)   Gen: Pt sitting up in NAD, interactive and cooperative on exam  Eyes: sclerae anicteric, no injection  ENT: MMM  Cardiac: RRR, nl S1, S2  Resp: Clear on anterior exam  GI: Normoactive BS, abd soft,nontender  Skin: Warm, dry, no jaundice  Neuro: alert, oriented, answers questions appropriately      PERTINENT STUDIES:    Orders Only on 01/16/2018   Component Date Value Ref Range Status     Glucose 01/16/2018 97  70 - 99 mg/dL Final     C Peptide 01/16/2018 1.1  0.9 - 6.9 ng/mL Final     Glucose 01/16/2018 134* 70 - 99 mg/dL Final     C Peptide 01/16/2018 4.3  0.9 - 6.9 ng/mL Final     Glucose 01/16/2018 79  70 - 99 mg/dL Final     C Peptide 01/16/2018 2.3  0.9 - 6.9 ng/mL Final     Again, thank you for allowing me to participate in the care of your patient.      Sincerely,    Vijaya Genao MD      "

## 2018-03-05 NOTE — PROGRESS NOTES
Service Date: 2018      Justyna Lawson MD    [facility] [place]   [address],  [address]   [place] [address]      RE: Dinora Loo   MRN: [id number]   : 1965      Dear Dr. Lawson:      Thank you for referring Dinora Loo back for neurologic followup on 2018.  The patient comes with a chief complaint of classic migraine.      The patient was earlier seen on 2017 for migraine.  She increased her dose of amitriptyline at my suggestion.  She said she has had a good response from the higher dose.  She said that she has had probably 1 or 2 headaches per week  since that time.  They have been less intense.  The patient has had some dry mouth, but she has been able to keep her mouth moist and she had dry mouth before starting the medication.  She has been chewing sugarless gum.  She did discuss with me bloating and gas issues as well as diarrhea that came for a few days.  She had earlier been started on Flagyl for this.  She had a rash with it and stopped it and rash left.  Her symptoms improved.  She has not gone ahead to consider FL-40 lenses to see if this would help her chronic headaches.  Glare has been a major issue as well as fluorescent light.  The patient does have an interest regarding this.  She has also noted her headaches do occur when she has been attempting to focus.  She did note that she has had chronic dry eye issues and she does use hot packs.  She has not had any orthostatic complaints nor has she had any worsening of chronic constipation.  She has a prescription for Amitiza if needed. We discussed amitriptyline dosage and side effects.  She would like to consider going to a higher dosage if possible.  She is on the 50 mg dose.  She did have a normal EKG on 2018.  The patient did discuss with me the use of Cefaly device today.  She is interested in having formal neural eye examination to consider special lenses to help with her  light sensitivity but also because of her problems with near vision.  She has interest in purchasing the Cefaly machine and I did go over its use and its treatment, chronic migraine.  She is aware that if she fails treatment that has been recommended she may be a candidate for Botox injections.  She could conceivably try other medications that are preventative orally.      Neurologic examination showed the patient's blood pressure was 116/70 with a pulse of 72.  She had normal cardiac rhythm without any gallops or murmurs.  She did have a supple neck.  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  She had good extraocular movements.      IMPRESSION:  Chronic migraine.      The patient has had chronic migraine and I did give her further suggestions for treatment.  She will cautiously increase her dose of amitriptyline to 60 mg.  I have warned her about worsening dry mouth as well as cardiovascular issues including orthostatic hypotension.  The patient is going to be seen in followup in the Neurology Department in 6-12 months and on a p.r.n. basis.  She said she would keep in touch by phone.  I have referred her to the Neuro-Ophthalmologist here.      Thank you for allowing me to see this patient.      Sincerely yours,      Gary Canchola MD      I spent 20 minutes with the patient.  Over 50% of the time this involved counseling and coordination of care.      cc:   Vijaya Wilcox MD   [address] [address]         GARY CANCHOLA MD             D: 2018   T: 2018   MT: AKA      Name:     MALINI DAVIS   MRN:      [id number]        Account:      [id number]   :      1965           Service Date: 2018      Document: [id number]

## 2018-03-06 ENCOUNTER — CARE COORDINATION (OUTPATIENT)
Dept: NEUROLOGY | Facility: CLINIC | Age: 53
End: 2018-03-06

## 2018-03-06 NOTE — PROGRESS NOTES
----- Message -----      From: Gina Ervin RN      Sent: 3/5/2018        To: Gina Ervin RN     Patient would like last 2 office visit notes mailed to her. Waiting for 2/21 note to be dictated/transcribed. Mail both to her when ready.                     3/6/18:  Last two clinic notes mailed to the patient as requested.

## 2018-03-08 ASSESSMENT — ACTIVITIES OF DAILY LIVING (ADL): I_KEEP_THINKING_ABOUT_HOW_BADLY_I_WANT_THE_PAIN_TO_STOP: 1 = TO A SLIGHT DEGREE

## 2018-03-09 ENCOUNTER — OFFICE VISIT (OUTPATIENT)
Dept: ANESTHESIOLOGY | Facility: CLINIC | Age: 53
End: 2018-03-09
Payer: COMMERCIAL

## 2018-03-09 DIAGNOSIS — F43.89 ADJUSTMENT REACTION TO CHRONIC STRESS: Primary | ICD-10-CM

## 2018-03-09 DIAGNOSIS — Z90.410 ACQUIRED TOTAL ABSENCE OF PANCREAS: ICD-10-CM

## 2018-03-09 RX ORDER — PANCRELIPASE 24000; 76000; 120000 [USP'U]/1; [USP'U]/1; [USP'U]/1
CAPSULE, DELAYED RELEASE PELLETS ORAL
Qty: 450 CAPSULE | Refills: 6 | OUTPATIENT
Start: 2018-03-09

## 2018-03-09 NOTE — MR AVS SNAPSHOT
After Visit Summary   3/9/2018    Dinora Mcghee    MRN: [id number]           Patient Information     Date Of Birth          1965        Visit Information        Provider Department      3/9/2018 10:00 AM Maria Elena Abdalla, PhD [facility]        Today's Diagnoses     Adjustment reaction to chronic stress    -  1       Follow-ups after your visit        Follow-up notes from your care team     Return in about 2 weeks (around 3/23/2018).      Your next 10 appointments already scheduled     Mar 23, 2018  8:30 AM CDT   (Arrive by 8:15 AM)   Return Visit with Jesse Richardson DO   [facility] ([facility])    [address]  [address]  [place] [address] [phone]            Jun 06, 2018  4:20 PM CDT   (Arrive by 4:05 PM)   Return Visit with Vijaya Genao MD   [facility] ([facility])    [address]  4th [address]  [place] [id number] [phone]              Who to contact     Please call your clinic at [phone] to:    Ask questions about your health    Make or cancel appointments    Discuss your medicines    Learn about your test results    Speak to your doctor            Additional Information About Your Visit        [employer] Information     [employer] gives you secure access to your electronic health record. If you see a primary care provider, you can also send messages to your care team and make appointments. If you have questions, please call your primary care clinic.  If you do not have a primary care provider, please call [phone] and they will assist you.      [employer] is an electronic gateway that provides easy, online access to your medical records. With [employer], you can request a clinic appointment, read your test results, renew a prescription or communicate with your care team.     To access your  existing account, please contact your [facility] or call [phone] for assistance.        Care EveryWhere ID     This is your Care EveryWhere ID. This could be used by other organizations to access your [facility] medical records  [id number]         Blood Pressure from Last 3 Encounters:   02/28/18 111/64   02/21/18 116/70   01/16/18 119/72    Weight from Last 3 Encounters:   02/28/18 66.8 kg (147 lb 3.2 oz)   02/21/18 67.2 kg (148 lb 1.6 oz)   01/16/18 67.6 kg (149 lb 1.6 oz)              Today, you had the following     No orders found for display         Today's Medication Changes          These changes are accurate as of 3/9/18 11:59 PM.  If you have any questions, ask your nurse or doctor.               These medicines have changed or have updated prescriptions.        Dose/Directions    levothyroxine 125 MCG tablet   Commonly known as:  SYNTHROID/LEVOTHROID   This may have changed:    - how much to take  - how to take this  - additional instructions   Used for:  Itching, Post-pancreatectomy diabetes (H), History of pancreatectomy, Pancreatic insufficiency        Take 1 tablet (125 mcg), by mouth daily before breakfast.   Quantity:  1 tablet   Refills:  0                Primary Care Provider Office Phone # Fax #    Justyna Lawson [phone]       [facility] [place] [address]  [place] [facility] [address]        Equal Access to Services     [facility] : Hadii monty johnsono Sopooja, waaxda luqadaha, qaybta kaalmada kellie, ivan wallace . So [facility] [phone].    ATENCIÓN: Si habla español, tiene a muñoz disposición servicios gratuitos de asistencia lingüística. Llame al [phone].    We comply with applicable federal civil rights laws and [place] laws. We do not discriminate on the basis of race, color, national origin, age, disability, sex, sexual orientation, or gender identity.            Thank you!     Thank you for  [facility]  for your care. Our goal is always to provide you with excellent care. Hearing back from our patients is one way we can continue to improve our services. Please take a few minutes to complete the written survey that you may receive in the mail after your visit with us. Thank you!             Your Updated Medication List - Protect others around you: Learn how to safely use, store and throw away your medicines at www.disposemymeds.org.          This list is accurate as of 3/9/18 11:59 PM.  Always use your most recent med list.                   Brand Name Dispense Instructions for use Diagnosis    acetaminophen 500 MG Caps      Take 2 mg by mouth 2 times daily        * amitriptyline 10 MG tablet    ELAVIL     Take 5 tablets by mouth        * amitriptyline 10 MG tablet    ELAVIL    150 tablet    Take 5 tablets (50 mg) by mouth At Bedtime    Headache disorder       * amitriptyline 50 MG tablet    ELAVIL    60 tablet    Take 1 tablet (50 mg) by mouth At Bedtime    Headache disorder       * amitriptyline 10 MG tablet    ELAVIL    60 tablet    Take 1 tablet (10 mg) by mouth At Bedtime    Headache disorder       amylase-lipase-protease [id number] UNITS Cpep per EC capsule    CREON 24    450 capsule    Take 3-4 capsules by mouth with meals and 1-2 with snacks. Maximum 15 capsules per day.    Acquired total absence of pancreas       aspirin 81 MG EC tablet     90 tablet    Take 1 tablet (81 mg) by mouth daily    High platelet count (H)       azelastine 0.1 % spray    ASTELIN     Spray 1 spray into both nostrils 2 times daily        BD SHARPS  Misc     1 each    1 Container as needed    Post-pancreatectomy diabetes (H)       blood glucose monitoring lancets     1 Box    Use to test blood sugar 6-8 times daily or as directed.    Acute on chronic pancreatitis (H)       blood glucose monitoring test strip    PAT CONTOUR NEXT    2 Box    Use to test blood sugar  6 times daily or as directed.    Post-pancreatectomy diabetes (H)       celecoxib 100 MG capsule    celeBREX    60 capsule    Take 1 capsule (100 mg) by mouth 2 times daily    Chronic abdominal pain       cetirizine 10 MG tablet    zyrTEC     Take 10 mg by mouth daily    Elevated liver enzymes       CONTOUR NEXT EZ MONITOR W/DEVICE Kit      1 Device 6 times daily    Itching, Post-pancreatectomy diabetes (H), History of pancreatectomy, Pancreatic insufficiency       diphenhydrAMINE 25 MG capsule    BENADRYL ALLERGY    56 capsule    Take 1 capsule (25 mg) by mouth every 6 hours as needed for itching or allergies    Itching, Post-pancreatectomy diabetes (H), History of pancreatectomy, Pancreatic insufficiency       docusate sodium 100 MG capsule    COLACE    60 capsule    Take 1 capsule (100 mg) by mouth 2 times daily as needed for constipation,    Itching, Post-pancreatectomy diabetes (H), History of pancreatectomy, Pancreatic insufficiency       glucagon 1 MG kit    GLUCAGON EMERGENCY    1 mg    Inject 1 mg into the muscle once for 1 dose    Post-pancreatectomy diabetes (H)       glucose 40 % Gel gel     3 Tube    Take 15-30 g by mouth every 15 minutes as needed for low blood sugar    Acquired total absence of pancreas       Injection Device for insulin Tika    NOVOPEN ECHO    1 each    1 each daily as needed    Post-pancreatectomy diabetes (H)       insulin aspart 100 UNIT/ML injection    NovoLOG PENFILL    3 mL    Administer subcutaneously 0.5 unit per 45 grams of carbohydrates.    Post-pancreatectomy diabetes (H)       insulin pen needle 32G X 4 MM    BD KEN U/F    100 each    Use 6-8 times per day    Acquired total absence of pancreas       LANsoprazole 30 MG CR capsule    PREVACID    30 capsule    Take 1 capsule (30 mg) by mouth daily Take 30-60 minutes before a meal.    Post-pancreatectomy diabetes (H), Pancreatic insufficiency       levothyroxine 125 MCG tablet    SYNTHROID/LEVOTHROID    1 tablet    Take 1  tablet (125 mcg), by mouth daily before breakfast.    Itching, Post-pancreatectomy diabetes (H), History of pancreatectomy, Pancreatic insufficiency       * lubiprostone 24 MCG capsule    AMITIZA     Take 2-3 capsules by mouth        * Lubiprostone 8 MCG Caps capsule    AMITIZA    150 capsule    Take 5 capsules (40 mcg) by mouth daily Take 3 capsules in AM and 2 capsules in PM    Other constipation       multivitamin CF formula capsule    CHOICEFUL     Take 1 tablet by mouth daily    Itching, Post-pancreatectomy diabetes (H), History of pancreatectomy, Pancreatic insufficiency       order for DME     1 Device    Equipment being ordered:Cefaly    Headache disorder       prochlorperazine 5 MG tablet    COMPAZINE    90 tablet    Take two 5 mg tablets by mouth every six hours as needed for nausea.    Itching, Post-pancreatectomy diabetes (H), History of pancreatectomy, Pancreatic insufficiency       rifaximin 550 MG Tabs tablet    XIFAXAN    30 tablet    Take 1 tablet (550 mg) by mouth 3 times daily for 10 days    Small intestinal bacterial overgrowth       senna-docusate 8.6-50 MG per tablet    SENOKOT-S;PERICOLACE    100 tablet    Take 1 tablet by mouth 2 times daily as needed for constipation    Itching, Post-pancreatectomy diabetes (H), History of pancreatectomy, Pancreatic insufficiency       VAGIFEM 10 MCG Tabs vaginal tablet   Generic drug:  estradiol           ZOMIG 5 MG tablet   Generic drug:  ZOLMitriptan      Take 5 mg by mouth at onset of headache for migraine        * Notice:  This list has 6 medication(s) that are the same as other medications prescribed for you. Read the directions carefully, and ask your doctor or other care provider to review them with you.
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
## 2018-03-13 ENCOUNTER — TELEPHONE (OUTPATIENT)
Dept: NEUROLOGY | Facility: CLINIC | Age: 53
End: 2018-03-13

## 2018-03-13 NOTE — TELEPHONE ENCOUNTER
Better taking Elavil; sleeping up to 7 and half hours per night ; discussed convergence insuffieciency; to see Amna

## 2018-03-19 ENCOUNTER — MYC MEDICAL ADVICE (OUTPATIENT)
Dept: GASTROENTEROLOGY | Facility: CLINIC | Age: 53
End: 2018-03-19

## 2018-03-19 DIAGNOSIS — Z90.410 ACQUIRED TOTAL ABSENCE OF PANCREAS: ICD-10-CM

## 2018-03-23 ENCOUNTER — OFFICE VISIT (OUTPATIENT)
Dept: RHEUMATOLOGY | Facility: CLINIC | Age: 53
End: 2018-03-23
Attending: INTERNAL MEDICINE
Payer: COMMERCIAL

## 2018-03-23 VITALS
BODY MASS INDEX: 22.46 KG/M2 | TEMPERATURE: 97.5 F | OXYGEN SATURATION: 99 % | DIASTOLIC BLOOD PRESSURE: 70 MMHG | WEIGHT: 148.2 LBS | HEIGHT: 68 IN | HEART RATE: 78 BPM | SYSTOLIC BLOOD PRESSURE: 114 MMHG

## 2018-03-23 DIAGNOSIS — R07.89 CHEST WALL PAIN: ICD-10-CM

## 2018-03-23 DIAGNOSIS — E03.9 HYPOTHYROIDISM, UNSPECIFIED TYPE: ICD-10-CM

## 2018-03-23 DIAGNOSIS — R76.8 IGG4 SELECTIVELY HIGH IN PLASMA: Primary | ICD-10-CM

## 2018-03-23 DIAGNOSIS — R76.8 IGG4 SELECTIVELY HIGH IN PLASMA: ICD-10-CM

## 2018-03-23 DIAGNOSIS — K76.9 LIVER LESION: ICD-10-CM

## 2018-03-23 LAB
ALBUMIN SERPL-MCNC: 3.9 G/DL (ref 3.4–5)
ALP SERPL-CCNC: 160 U/L (ref 40–150)
ALT SERPL W P-5'-P-CCNC: 64 U/L (ref 0–50)
ANION GAP SERPL CALCULATED.3IONS-SCNC: 6 MMOL/L (ref 3–14)
AST SERPL W P-5'-P-CCNC: 44 U/L (ref 0–45)
BASOPHILS # BLD AUTO: 0.1 10E9/L (ref 0–0.2)
BASOPHILS NFR BLD AUTO: 2.9 %
BILIRUB DIRECT SERPL-MCNC: 0.2 MG/DL (ref 0–0.2)
BILIRUB SERPL-MCNC: 0.8 MG/DL (ref 0.2–1.3)
BUN SERPL-MCNC: 12 MG/DL (ref 7–30)
CALCIUM SERPL-MCNC: 9 MG/DL (ref 8.5–10.1)
CHLORIDE SERPL-SCNC: 104 MMOL/L (ref 94–109)
CO2 SERPL-SCNC: 29 MMOL/L (ref 20–32)
CREAT SERPL-MCNC: 0.68 MG/DL (ref 0.52–1.04)
DIFFERENTIAL METHOD BLD: ABNORMAL
EOSINOPHIL # BLD AUTO: 0.2 10E9/L (ref 0–0.7)
EOSINOPHIL NFR BLD AUTO: 4.6 %
ERYTHROCYTE [DISTWIDTH] IN BLOOD BY AUTOMATED COUNT: 13.8 % (ref 10–15)
GFR SERPL CREATININE-BSD FRML MDRD: >90 ML/MIN/1.7M2
GLUCOSE SERPL-MCNC: 101 MG/DL (ref 70–99)
HCT VFR BLD AUTO: 41.7 % (ref 35–47)
HGB BLD-MCNC: 13.9 G/DL (ref 11.7–15.7)
IGG SERPL-MCNC: 1200 MG/DL (ref 695–1620)
IGG1 SER-MCNC: 408 MG/DL (ref 300–856)
IGG2 SER-MCNC: 397 MG/DL (ref 158–761)
IGG3 SER-MCNC: 92 MG/DL (ref 24–192)
IGG4 SER-MCNC: 80 MG/DL (ref 11–86)
IMM GRANULOCYTES # BLD: 0 10E9/L (ref 0–0.4)
IMM GRANULOCYTES NFR BLD: 0 %
LYMPHOCYTES # BLD AUTO: 1.7 10E9/L (ref 0.8–5.3)
LYMPHOCYTES NFR BLD AUTO: 41.8 %
MCH RBC QN AUTO: 31.2 PG (ref 26.5–33)
MCHC RBC AUTO-ENTMCNC: 33.3 G/DL (ref 31.5–36.5)
MCV RBC AUTO: 94 FL (ref 78–100)
MONOCYTES # BLD AUTO: 0.6 10E9/L (ref 0–1.3)
MONOCYTES NFR BLD AUTO: 14.8 %
NEUTROPHILS # BLD AUTO: 1.5 10E9/L (ref 1.6–8.3)
NEUTROPHILS NFR BLD AUTO: 35.9 %
NRBC # BLD AUTO: 0 10*3/UL
NRBC BLD AUTO-RTO: 0 /100
PLATELET # BLD AUTO: 382 10E9/L (ref 150–450)
POTASSIUM SERPL-SCNC: 4.1 MMOL/L (ref 3.4–5.3)
PROT SERPL-MCNC: 7.7 G/DL (ref 6.8–8.8)
RBC # BLD AUTO: 4.46 10E12/L (ref 3.8–5.2)
SODIUM SERPL-SCNC: 138 MMOL/L (ref 133–144)
T4 FREE SERPL-MCNC: 1.13 NG/DL (ref 0.76–1.46)
TSH SERPL DL<=0.005 MIU/L-ACNC: 0.74 MU/L (ref 0.4–4)
WBC # BLD AUTO: 4.1 10E9/L (ref 4–11)

## 2018-03-23 PROCEDURE — 82784 ASSAY IGA/IGD/IGG/IGM EACH: CPT | Performed by: INTERNAL MEDICINE

## 2018-03-23 PROCEDURE — G0463 HOSPITAL OUTPT CLINIC VISIT: HCPCS | Mod: ZF

## 2018-03-23 PROCEDURE — 80048 BASIC METABOLIC PNL TOTAL CA: CPT | Performed by: INTERNAL MEDICINE

## 2018-03-23 PROCEDURE — 84443 ASSAY THYROID STIM HORMONE: CPT | Performed by: INTERNAL MEDICINE

## 2018-03-23 PROCEDURE — 85025 COMPLETE CBC W/AUTO DIFF WBC: CPT | Performed by: INTERNAL MEDICINE

## 2018-03-23 PROCEDURE — 80076 HEPATIC FUNCTION PANEL: CPT | Performed by: INTERNAL MEDICINE

## 2018-03-23 PROCEDURE — 82787 IGG 1 2 3 OR 4 EACH: CPT | Performed by: INTERNAL MEDICINE

## 2018-03-23 PROCEDURE — 36415 COLL VENOUS BLD VENIPUNCTURE: CPT | Performed by: INTERNAL MEDICINE

## 2018-03-23 PROCEDURE — 84439 ASSAY OF FREE THYROXINE: CPT | Performed by: INTERNAL MEDICINE

## 2018-03-23 ASSESSMENT — PAIN SCALES - GENERAL: PAINLEVEL: MILD PAIN (2)

## 2018-03-23 NOTE — PROGRESS NOTES
Rheumatology Clinic Visit-Fellow Note     Dinora Mcghee MRN# [id number]   YOB: 1965 Age: 52 year old     Date of Visit: 03/23/2018   Last seen: 10/20/2017  Primary care provider: Justyna Lawson  Referring Provider: Dr. Ara Lugo with GI  Reason for Referral: Further evaluation and management for possible Sjogren's vs IgG4 related disease in patient with sicca symptoms and evidence of isolated? IgG4 in liver/pancreas.           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   # h/o endometriosis  # h/o pituitary mass with 2/2 DI now s/p transsphenoidal resection in 1990  # hypothyroidism on synthroid.  # Fatigue in ill state  # left chest wall pain    Discussion:  53 y/o female with longstanding history of chronic pancreatitis s/p pancreatectomy with islet autotransplant 12/2016 who is overall improving but found to have elevated serum and > 60 HPF cells on liver biopsy staining. She has sicca symptoms for the past 5 years but no evidence of Sjogren's vs IgG4 related disease on lip biopsy. No other evidence of IgG4 related activity with no RPF, aortitis, orbital disease, thyroid, pulmonary, or kidney disease. Interestingly, IgG4 disease can impact the hypophysis and she does have a history of pituitary issues back in 1990. Recent meta analysis assessing IgG4 serum sensitivity is 87.2% with 82% specificity (1). Her biopsy containing > 50 IgG4 staining plasma cells is highly suggestive of IgG4 related disease as well and meets recent proposed pathologic recommendations of IgG4 related disease (2). She meets many of the features for IgG4 related disease including tissue biopsy.     In regards to management of her IgG4 Related disease there are no randomized control  trials and all medicines are investigational. The most recent evidence highlights that the IgG4 antibodies are pathologic and that medications that suppress B lymphocytes like CD-20 cells (Rituximab) creating the antibodies are the preferred therapy. This is especially important if the organ dysfunction can be a critical organ like the liver in a patient with recent surgeries and transplant. Currently her liver dysfunction has normalized and there is no evidence of ongoing IgG4 related disease at this time.     Overall, we agree with the high levels of stress and degree of fatigue with worsening HA during active work we recommend she go very slow with her part time work. She has been severely ill and it will take time to regain and retrain her stamina. She has ongoing left chest wall pain that has been tolerable.     1. Daniel Barber et al.  Diagnostic Value of Serum IgG4 for IgG4-Related Disease: A MADELYN-Compliant Systematic Review and [place]-Analysis.  Ed. Lj Quinn. Medicine 95.21 (2016): e3785. [employer]. Web. 17 May 2017.  2. Annabel V, Ever Y, Donovan HOLMANK, et al. Consensus statement on the pathology of IgG4-RD. Mod Pathol 2012;64:3061-7.    Plan:  -- LFTs, TSH, T4, and IgG subclasses  -- agree with plan to continue to monitor off immune suppressives given ongoing stability in liver labs and IgG4 levels.   -- If liver dysfunction returns would need to decide with GI if we suspect related to IgG4 and if so then therapeutic options for IgG4 related disease would normally be Rituximab preferred (but has steroids (can cause irreversible damage to islet cells) with infusion) vs MMF (no steroids but not ideal agent).  -- Patient deferred initiating Evoxac or restasis at this point in time.  -- discussed various lozenges that help sicca symptoms  -- for her left chest wall, if persistent then agents to aid could be lidocaine patch, topical voltaren, or topical lidocaine.  -- she will need slow reintroduction into work as  she was severely ill.    Follow up:  RTC in 6 months, unless new symptoms emerge. Dr. Becerra clinic    Prevention:  Pneumovax PPSV23: 12/2016  PCV13 (Prevnar): not in [facility]  Tdap: 1/7/2008?  Influenza received 10/20/17  Shingles vaccine: not recieved  Reminded if on biologic no live vaccine (flu mist, shingles): n/a  HBVsAg: negative  HBVcore: ordered  HCV: negative   Q gold (or T spot): ordered  Bactrim prophylaxis: n/a  Fungal prophylaxis: n/a  Vitamin D and calcium/bone health: Ca and Vitamin D    Immunizations:  Immunization History   Administered Date(s) Administered     Hib (PRP-T) 12/01/2016     Influenza (IIV3) PF 09/29/2011, 11/15/2014, 10/21/2016     Influenza (intradermal) 11/01/2003, 09/29/2011     Influenza Vaccine IM 3yrs+ 4 Valent IIV4 10/24/2015, 10/21/2016, 10/20/2017     Influenza Vaccine, 3 YRS +, IM (QUADRIVALENT W/PRESERVATIVES) 11/04/2013     Meningococcal (Bexsero ) 12/09/2016     Meningococcal (Menactra ) 12/01/2016     Pneumococcal 23 valent 12/01/2016     TD (ADULT, 7+) 07/09/1998     TDAP Vaccine (Adacel) 01/07/2008     Discussed with attending Dr. Becerra, who agrees with the above assessment and plan.    Jesse Richardson DO  Rheumatology Fellow  Pager: [phone]      Attending Note: I saw and evaluated the patient with Dr. Richardson. I agree with the assessment and plan.    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  Document previously scanned on 1/12/17.  Validation form completed and scanned.  Code Status reflects choices in most recent ACP document..  Confirmed/documented designated decision maker(s).  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     Priority: Medium     Acute on chronic pancreatitis (H) 09/03/2016     Priority: Medium     Acute abdominal pain 08/02/2016     Priority: Medium     Abdominal pain, epigastric 10/23/2015     Priority: Medium     Severe protein-calorie malnutrition (H) 06/09/2015     Priority: Medium     Acute pancreatitis 06/03/2015     Priority: Medium     Abdominal pain 06/02/2015     Priority: Medium     S/P ERCP 06/02/2015     Priority: Medium     Abdominal pain, generalized 04/20/2015     Priority: Medium     History of ERCP 04/20/2015     Priority: Medium     Other type of intractable migraine      Priority: Medium     Diagnosis updated by automated process. Provider to review and confirm.       GERD (gastroesophageal reflux disease) 12/01/2010     Priority: Medium     Lumbago 04/18/2005     Priority: Medium     History of L5-S1 degenerative disk disease.         Sprain and strain of other specified sites of hip and thigh 12/09/2002     Priority: Medium     Chondromalacia of patella 12/09/2002     Priority: Medium     Need for prophylactic immunotherapy      Priority: Medium     trees, grass, srw, dust mites, cat       Allergic rhinitis due to other allergen 12/21/2001     Priority: Medium     Hypothyroidism      Priority: Medium     Problem list name updated by automated process. Provider to review       Sensorineural hearing loss 01/10/2005     Priority: Medium     Problem list name updated by automated process. Provider to review       Vertiginous syndrome and labyrinthine disorder 01/10/2005     Priority: Medium      "Problem list name updated by automated process. Provider to review              History of Present Illness:   Dinora Mcghee is a 52 year old female with a past medical history of hypothyroidism, h/o pituitary mass and secondary DI s/p transphenoidal resection 1990, HLD, left knee OA, lumbar DDD, chronic pancreatitis for 30 years, now s/p islet cell transplantation who presents today for further evaluation of elevated IgG4 found both in serum and liver biopsy as well as sicca symptoms.     She notes that she has had longstanding issues with pancreatitis without any mention of aortitis, RPF, urinary obstruction. She denies any history of cancers, lymphoma or leukemia. No family history of autoimmune conditions other than possible RA in her dad. She had distant DI secondary from pituitary mass (s/p removal and not needing hormones) and joint wise has longstanding OA of left knee. She mentions that for the past 5 years she has had dry eye requiring eye drops. Sometimes related to her allergies she has to now regularly use eye drops. \"Feels like sand\" is rubbing in eyes. She also noticed ~ 5 years ago dry mouth where a cracker would not dissolve. She has to constantly carry water and drink it to keep her mouth dry. She uses sugar free gum and lozenges. Most of her clinical symptom burden has been related to her pancreas with frequent ERCP and concern for Sphincter of Oddi dysfunction. She had issues with significant pancreatic insuffiencey with malnutrition and weight loss. She underwent islet cell autotransplantation 12/28/2017 and was found to have elevated IgG4 cells in her liver biopsy. Subsequent IgG4 levels were elevated.    Interim history: 6/20/2017 - 8/4/2017  Comes in today frustrated about the multiple denials from the insurance about Rituximab for her IgG4 Related disease. She has had recent elevations of her LFTs and mentions that the last 3 days she has started to feel worse like before her " pancreatectomy. Some nausea, no vomiting, but her appetite is lower. She denies any jaundice, but does not itching of her skin. She denies any fevers, chills, but has had 1 day of watery, nonbloody diarrhea. She had a recent MRCP with signs of biliary dilatation at ducts within the liver. She has a new hyperenhancement lesion in her liver with stability of other lesions. She is following with Dr. Lugo within the next week. She mentions that her dry eyes are getting worse.     Interim history: 8/4/2017 - 10/20/2017  After further discussion with GI and SOT regarding therapies Dinora's LFTs normalized and the consensus was to monitor and hold on immune suppressive therapies given the side effect profile. She has continued to improve and is now off insulin. She wonders about her known slow emptying an gastroparesis has been acting up and she has had issues with N/V the past 2 weeks particularly after she lays down (worse in AM, but best midday). Otherwise things have been going well, denies any infections and her shoulder is almost back to normal.     Interim History: 10/20/2017 - 3/23/2018  Returns today feeling better. She has adjusted her diet with great improvement. Has noticed some fatigue the last several weeks, but denies any infectious symptoms. Has intermittent sharp pains in her left lower rib chest wall. This has been responding to tylenol. Denies any fevers or chills. She has noticed fatigue and tries to be very active with walking 3 miles. She mentions that she has to take naps now she never had before. She has been back part time as well that has worsened her control over her HA as well as her stamina levels.          Review of Systems:   Constitutional: denies fevers/chills, positive fatigue, patient is having weight gain s/p transplant.  Skin: denies rash, raynauds or alopecia  Eyes: denies hx of uvetitis, double vision, positive dry eyes  Ears/Nose/Throat:  denies recurrent URI or sinusitis,  positive dry mouth, denies any oral or nasal ulcers  Respiratory: No shortness of breath, dyspnea on exertion, cough, or hemoptysis  Cardiovascular:  denies chest pain, palpitations, hx pleurisy  Gastrointestinal: denies diarrhea, blood in stool, hx of IBD, positive past pancreatitis, positive nausea, rare vomiting, positive RUQ pain.  Genitourinary: denies hematuria.  Musculoskeletal: denies red, tender, swollen joints, left shoulder frozen shoulder since surgery almost normal.  Neurologic:  denies headaches, seizures, some numbness or tingling s/p abdominal surgery.  Psychiatric:  denies history of depression or mental illness  Hematologic/Lymphatic/Immunologic:  denies history of blood clots, easy bruising, bleeding.  Endocrine:  Positive hypothyroidism.          Past Medical History:     Past Medical History:   Diagnosis Date     Allergic rhinitis, cause unspecified      Allergy to other foods     strawberries, apples, celeries, alice, watermelon     Arthritis     left knee     Choledocholithiasis     long after cholecystectomy     Chronic abdominal pain      Chronic constipation      Chronic nausea      Chronic pancreatitis (H)      Degeneration of lumbar or lumbosacral intervertebral disc      Esophageal reflux     w/ hiatal hernia     Gastroparesis      Hiatal hernia      History of pituitary adenoma     s/p resection     Hypothyroidism      Migraines      Mild hyperlipidemia      On tube feeding diet     presence of GJ tube     Pancreatic disease     PD stricture, 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    Past Surgical History  Past Surgical History:   Procedure Laterality Date     ABDOMEN SURGERY      c sections: 93, 96, 98. endometriosis growth     APPENDECTOMY       C  DELIVERY ONLY       C  DELIVERY ONLY       repeat c section with incidental cystotomy with repair     C EXCIS PITUITARY,TRANSNASAL/SEPTAL      pituitary tumor removed for diabetes insipidus     C TOTAL ABDOM HYSTERECTOMY      w/ bilateral salpingoophorectomy      C WRIST ARTHROSCOP,RELEASE XVERS LIG Bilateral 08      SECTION       COLONOSCOPY       ENDOSCOPIC RETROGRADE CHOLANGIOPANCREATOGRAM N/A 2015    Procedure: ENDOSCOPIC RETROGRADE CHOLANGIOPANCREATOGRAM;  Surgeon: Mandeep Park MD;  Location: [facility]     ENDOSCOPIC RETROGRADE CHOLANGIOPANCREATOGRAM N/A 2016    Procedure: COMBINED ENDOSCOPIC RETROGRADE CHOLANGIOPANCREATOGRAPHY, PLACE TUBE/STENT;  Surgeon: Mandeep Park MD;  Location: [facility]     ENDOSCOPIC RETROGRADE CHOLANGIOPANCREATOGRAM N/A 3/17/2016    Procedure: COMBINED ENDOSCOPIC RETROGRADE CHOLANGIOPANCREATOGRAPHY, REMOVE FOREIGN BODY OR STENT/TUBE;  Surgeon: Mandeep Park MD;  Location: [facility]     ENDOSCOPIC RETROGRADE CHOLANGIOPANCREATOGRAM N/A 2016    Procedure: COMBINED ENDOSCOPIC RETROGRADE CHOLANGIOPANCREATOGRAPHY, PLACE TUBE/STENT;  Surgeon: Mandeep Park MD;  [facility]     ENDOSCOPIC RETROGRADE CHOLANGIOPANCREATOGRAM N/A 2016    Procedure: COMBINED ENDOSCOPIC RETROGRADE CHOLANGIOPANCREATOGRAPHY, REMOVE FOREIGN BODY OR STENT/TUBE;  Surgeon: Mandeep Park MD;  Location: [facility]     ENDOSCOPIC ULTRASOUND UPPER GASTROINTESTINAL TRACT (GI) N/A 10/3/2016    Procedure: ENDOSCOPIC ULTRASOUND, ESOPHAGOSCOPY / UPPER GASTROINTESTINAL TRACT (GI);  Surgeon: Guru Jose Rodas MD;  Location: [facility]     ESOPHAGOSCOPY, GASTROSCOPY, DUODENOSCOPY (EGD), COMBINED N/A 2015    Procedure: COMBINED ESOPHAGOSCOPY, GASTROSCOPY, DUODENOSCOPY (EGD), REMOVE FOREIGN BODY;  Surgeon: Mandeep Park MD;  [facility]     ESOPHAGOSCOPY, GASTROSCOPY, DUODENOSCOPY (EGD), COMBINED N/A 10/25/2015    Procedure: COMBINED ESOPHAGOSCOPY, GASTROSCOPY,  DUODENOSCOPY (EGD);  Surgeon: Sammy Amaro MD;  [facility]     ESOPHAGOSCOPY, GASTROSCOPY, DUODENOSCOPY (EGD), COMBINED N/A 10/25/2015    Procedure: COMBINED ESOPHAGOSCOPY, GASTROSCOPY, DUODENOSCOPY (EGD), BIOPSY SINGLE OR MULTIPLE;  Surgeon: Sammy Amaro MD;  Location: [facility]     ESOPHAGOSCOPY, GASTROSCOPY, DUODENOSCOPY (EGD), DILATATION, COMBINED       EXCISE LESION TRUNK N/A 4/17/2017    Procedure: EXCISE LESION TRUNK;  Removal of Abdominal Foreign Body;  Surgeon: Nestor Phoenix MD;  [facility]     HC ESOPH/GAS REFLUX TEST W NASAL IMPED >1 HR N/A 11/19/2015    Procedure: ESOPHAGEAL IMPEDENCE FUNCTION TEST WITH 24 HOUR PH GREATER THAN 1 HOUR;  Surgeon: Thiago Apple MD;  Location: [facility]     HC UGI ENDOSCOPY DIAG W BIOPSY  9/17/08     HC UGI ENDOSCOPY DIAG W BIOPSY  9/27/12     HC UGI ENDOSCOPY W ESOPHAGEAL DILATION BALLOON <30MM  9/17/08     HC UGI ENDOSCOPY W EUS N/A 5/5/2015    Procedure: COMBINED ENDOSCOPIC ULTRASOUND, ESOPHAGOSCOPY, GASTROSCOPY, DUODENOSCOPY (EGD);  Surgeon: Wm Dueñas MD;  Location: [facility]     INJECT TRANSVERSUS ABDOMINIS PLANE (TAP) BLOCK BILATERAL Left 9/22/2016    Procedure: INJECT TRANSVERSUS ABDOMINIS PLANE (TAP) BLOCK BILATERAL;  Surgeon: Dickson Corrigan MD;  [facility]     laparoscopic pineda  1995     LAPAROSCOPIC PANCREATECTOMY, TRANSPLANT AUTO ISLET CELL N/A 12/28/2016    Procedure: LAPAROSCOPIC PANCREATECTOMY, TRANSPLANT AUTO ISLET CELL;  Surgeon: Nestor Phoenix MD;  Location: [facility]     transphenoidal pituitary resection  1990            Social History:     Social History     Occupational History     director [employer]     Social History Main Topics     Smoking status: Former Smoker     Packs/day: 0.50     Years: 6.00     Types: Cigarettes     Start date: 9/1/1985     Quit date: 1/1/1992     Smokeless tobacco: Never Used      Comment: no 2nd hand     Alcohol use No      Comment: last drink 6/2016  - moderate social     Drug use: No      Sexual activity: Yes     Partners: Male     Birth control/ protection: None      Comment: Hystectomy 1999   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 6 years of smoking and quit in 1992. Denies any ETOH.  and lives in the [facility].          Family History:     Family History   Problem Relation Age of Onset     Eye Disorder Father      cataract, detached retina     Myocardial Infarction Father 60     Lipids Father      CEREBROVASCULAR DISEASE Father      Depression Father      Substance Abuse Father      Anesthesia Reaction Father      stroke right after surgery     Lipids Mother      Hypertension Mother      Thyroid Disease Mother      Eye Disorder Son      ptosis     Eye Disorder Paternal Grandmother      cataract     Eye Disorder Paternal Grandfather      cataract     DIABETES Paternal Grandfather      Eye Disorder Maternal Grandmother      cataract     Thyroid Disease Maternal Grandmother      Coronary Artery Disease Sister      Depression Sister      Depression Son      Anxiety Disorder Son      Thyroid Disease Sister      DIABETES Maternal Grandfather      Depression Nephew      Anxiety Disorder Nephew      Thyroid Disease Nephew      Type 2 Diabetes Cousin      paternal cousin     Father with likely RA following with multiple rheumatologists. 3 children healthy. 1 sister with thyroid issues. Denies any Sjogren's SLE, Scleroderma.            Allergies:     Allergies   Allergen Reactions     Apple Anaphylaxis     Depakote [Divalproex Sodium] Other (See Comments)     Chest pain     Zithromax [Azithromycin Dihydrate] Anaphylaxis     Noted in 4/7/08 ER     Darvocet [Propoxyphene N-Apap] Itching     Morphine Nausea and Vomiting and Rash     Nalbuphine Hcl Rash     RASH :nubaine     Zosyn [Piperacillin-Tazobactam In D5w] Rash     Possible allergy, did have a diffuse rash that seemed drug related but could have also been related to soap in the hospital.      Bactrim [Sulfamethoxazole  W-Trimethoprim] Other (See Comments) and Nausea and Vomiting     Severely low liver function.     Cats      Compazine [Prochlorperazine] Other (See Comments)     Twitching. Takes Benedryl and is fine     Corticosteroids Other (See Comments)     All oral,IV and injectable steroids are contraindicated (unless in life threatening situations) in Islet Auto transplant recipients. They can cause irreversible loss of islet cell function. Please contact patients transplant care coordinator Mecca Watkins RN BSN @ [phone]/Pager [phone], and Endocrinologist prior to administration.     Dust Mites      Grass      Prednisone Other (See Comments)     Insomnia       Ragweeds      Tape [Adhesive Tape] Blisters     Trees      Zofran [Ondansetron] Other (See Comments)     migraine     Flagyl [Metronidazole] Hives and Rash            Medications:     Current Outpatient Prescriptions   Medication Sig Dispense Refill     amylase-lipase-protease (CREON 24) 84043-43777 UNITS CPEP per EC capsule Take 3-4 capsules by mouth with meals and 1-2 with snacks. Maximum 15 capsules per day. 450 capsule 6     Lubiprostone (AMITIZA) 8 MCG CAPS capsule Take 5 capsules (40 mcg) by mouth daily Take 3 capsules in AM and 2 capsules in  capsule 3     amitriptyline (ELAVIL) 10 MG tablet Take 5 tablets by mouth       lubiprostone (AMITIZA) 24 MCG capsule Take 2-3 capsules by mouth       amitriptyline (ELAVIL) 50 MG tablet Take 1 tablet (50 mg) by mouth At Bedtime 60 tablet 3     amitriptyline (ELAVIL) 10 MG tablet Take 1 tablet (10 mg) by mouth At Bedtime 60 tablet 3     order for DME Equipment being ordered:Cefaly 1 Device 0     amitriptyline (ELAVIL) 10 MG tablet Take 5 tablets (50 mg) by mouth At Bedtime 150 tablet 3     ZOLMitriptan (ZOMIG) 5 MG tablet Take 5 mg by mouth at onset of headache for migraine       azelastine (ASTELIN) 0.1 % spray Spray 1 spray into both nostrils 2 times daily       acetaminophen 500 MG CAPS Take 2 mg by  mouth 2 times daily       LANsoprazole (PREVACID) 30 MG CR capsule Take 1 capsule (30 mg) by mouth daily Take 30-60 minutes before a meal. 30 capsule 11     celecoxib (CELEBREX) 100 MG capsule Take 1 capsule (100 mg) by mouth 2 times daily 60 capsule 1     estradiol (VAGIFEM) 10 MCG TABS vaginal tablet        blood glucose monitoring (PAT CONTOUR NEXT) test strip Use to test blood sugar 6 times daily or as directed. 2 Box 11     insulin pen needle (BD KEN U/F) 32G X 4 MM Use 6-8 times per day 100 each 11     cetirizine (ZYRTEC) 10 MG tablet Take 10 mg by mouth daily       insulin aspart (NOVOLOG PENFILL) 100 UNIT/ML injection Administer subcutaneously 0.5 unit per 45 grams of carbohydrates. 3 mL 3     senna-docusate (SENOKOT-S;PERICOLACE) 8.6-50 MG per tablet Take 1 tablet by mouth 2 times daily as needed for constipation 100 tablet      prochlorperazine (COMPAZINE) 5 MG tablet Take two 5 mg tablets by mouth every six hours as needed for nausea. 90 tablet 3     diphenhydrAMINE (BENADRYL ALLERGY) 25 MG capsule Take 1 capsule (25 mg) by mouth every 6 hours as needed for itching or allergies 56 capsule 0     levothyroxine (SYNTHROID/LEVOTHROID) 125 MCG tablet Take 1 tablet (125 mcg), by mouth daily before breakfast. (Patient taking differently: Take 137 mcg by mouth Take 1 tablet (125 mcg), by mouth daily before breakfast.) 1 tablet 0     docusate sodium (COLACE) 100 MG capsule Take 1 capsule (100 mg) by mouth 2 times daily as needed for constipation, 60 capsule 0     multivitamin CF formula (CHOICEFUL) capsule Take 1 tablet by mouth daily  11     Blood Glucose Monitoring Suppl (CONTOUR NEXT EZ MONITOR) W/DEVICE KIT 1 Device 6 times daily       aspirin 81 MG EC tablet Take 1 tablet (81 mg) by mouth daily 90 tablet 3     Injection Device for insulin (NOVOPEN ECHO) MARCO 1 each daily as needed 1 each 0     glucose 40 % GEL gel Take 15-30 g by mouth every 15 minutes as needed for low blood sugar 3 Tube 2     Sharps  "Container (BD SHARPS ) MISC 1 Container as needed 1 each 1     blood glucose monitoring (PAT MICROLET) lancets Use to test blood sugar 6-8 times daily or as directed. 1 Box prn     glucagon (GLUCAGON EMERGENCY) 1 MG kit Inject 1 mg into the muscle once for 1 dose 1 mg 1            Physical Exam:   Blood pressure 114/70, pulse 78, temperature 97.5  F (36.4  C), temperature source Oral, height 1.727 m (5' 8\"), weight 67.2 kg (148 lb 3.2 oz), SpO2 99 %, not currently breastfeeding.  Wt Readings from Last 4 Encounters:   03/23/18 67.2 kg (148 lb 3.2 oz)   02/28/18 66.8 kg (147 lb 3.2 oz)   02/21/18 67.2 kg (148 lb 1.6 oz)   01/16/18 67.6 kg (149 lb 1.6 oz)     Ideal body weight: 63.9 kg (140 lb 14 oz)  Adjusted ideal body weight: 65.2 kg (143 lb 12.9 oz)    Constitutional: well-developed, appearing stated age; cooperative  Eyes: nl EOM, conjunctiva, sclera. No icterus.   ENT: nl external ears, nose, lips, teeth, gums, throat  No mucous membrane lesions, dry saliva pool, no parotid enlargement  Neck: no mass or thyroid enlargement  Resp: lungs clear to auscultation,  CV: RRR, no murmurs, rubs or gallops, no edema  GI: no ABD mass but Left chest wall pain per MSK below. No Right sided or Lower quadrant pain.  Lymph: no cervical, supraclavicular, epitrochlear nodes    MS: All TMJ, neck, shoulder, elbow, wrist, MCP/PIP/DIP, spine, hip, knee, ankle, and foot MTP/IP joints were examined.   Soft tissue exam - No specific tenderness to palpation of soft tissue points on examination today     Neck - normal range of motion  Shoulder -  Right shoulder with normal ROM in full abduction/adduction and internal/external rotation without pain. Left shoulder much improved abduction and IR and ER on both passive and active ROM. No glenohumeral effusion/warmth, no tenderness bilaterally.   Chest Wall: left inner rib chest wall pain with reproducible, sharp tenderness over healed incision site.   Elbow - full ROM and no joint " effusion on exam; nontender bilaterally   Wrist - full ROM and no joint effusion on exam; nontender bilaterally  Hand - T0S0 of bilateral DIPs, PIPs, MCP joints.   Back - no specific spinal or paraspinal tenderness present  Hip - full ROM; nontender bilaterally  Knee - no joint effusion or joint line tenderness on exam; normal range of motion.  Ankle -  Left and right ankle joint lines without swelling or tenderness. Non-tender to palpation  Foot - no focal pain or synovitis on palpation of the MTPs bilaterally    Skin: no nail pitting, alopecia, rash, nodules or lesions  Neuro: nl cranial nerves, strength in both proximal and distal UE and LE.   Psych: nl judgement, orientation, memory, affect.         Data:     Results for orders placed or performed in visit on 01/16/18   Glucose   Result Value Ref Range    Glucose 97 70 - 99 mg/dL   C-peptide Level   Result Value Ref Range    C Peptide 1.1 0.9 - 6.9 ng/mL   Glucose   Result Value Ref Range    Glucose 134 (H) 70 - 99 mg/dL   C-peptide Level   Result Value Ref Range    C Peptide 4.3 0.9 - 6.9 ng/mL   Glucose   Result Value Ref Range    Glucose 79 70 - 99 mg/dL   C-peptide Level   Result Value Ref Range    C Peptide 2.3 0.9 - 6.9 ng/mL     *Note: Due to a large number of results and/or encounters for the requested time period, some results have not been displayed. A complete set of results can be found in Results Review.       Recent Labs   Lab Test  04/25/17   1355  04/04/17   0757  02/21/17   1315   12/29/16   0620   06/06/15   1903   10/23/12   1451   WBC  4.4  5.0  4.7   < >  10.7   < >   --    < >   --    RBC  4.38  4.04  4.28   < >  3.13*   < >   --    < >   --    HGB  11.1*  10.4*  11.6*   < >  9.1*   < >   --    < >   --    HCT  36.1  33.4*  37.1   < >  27.7*   < >   --    < >   --    MCV  82  83  87   < >  89   < >   --    < >   --    RDW  17.5*  15.8*  13.0   < >  13.2   < >   --    < >   --    PLT  543*  482*  733*   < >  107*   < >   --    < >   --     ALBUMIN  3.7  3.2*  3.7   < >  2.9*   < >  2.8*   < >  4.4   CRP   --    --    --    --   90.0*   --   71.0*   --   7.2   BUN  16  17  13   < >  7   < >  2*   < >   --     < > = values in this interval not displayed.      Recent Labs   Lab Test  12/20/16   1121  10/23/15   1554  10/09/13   0721   06/25/09   1423   TSH  0.13*  0.57  1.86   < >  0.37*   T4  1.17   --    --    --   1.17    < > = values in this interval not displayed.     Hemoglobin   Date Value Ref Range Status   01/16/2018 13.3 11.7 - 15.7 g/dL Final   08/14/2017 12.7 11.7 - 15.7 g/dL Final   08/04/2017 13.9 11.7 - 15.7 g/dL Final     Urea Nitrogen   Date Value Ref Range Status   01/16/2018 13 7 - 30 mg/dL Final   08/14/2017 13 7 - 30 mg/dL Final   08/04/2017 17 7 - 30 mg/dL Final     Creatinine   Date Value Ref Range Status   05/20/2017 0.6 0.52 - 1.04 mg/dL Final     Sed Rate   Date Value Ref Range Status   10/23/2012 11 0 - 20 mm/h Final   07/27/2006 9 0 - 20 mm/h Final     CRP Inflammation   Date Value Ref Range Status   12/29/2016 90.0 (H) 0.0 - 8.0 mg/L Final   06/06/2015 71.0 (H) 0.0 - 8.0 mg/L Final   10/23/2012 7.2 0.0 - 8.0 mg/L Final     AST   Date Value Ref Range Status   01/16/2018 14 0 - 45 U/L Final   10/20/2017 23 0 - 45 U/L Final   08/14/2017 20 0 - 45 U/L Final     Albumin   Date Value Ref Range Status   01/16/2018 3.5 3.4 - 5.0 g/dL Final   10/20/2017 3.7 3.4 - 5.0 g/dL Final   08/14/2017 3.3 (L) 3.4 - 5.0 g/dL Final     Alkaline Phosphatase   Date Value Ref Range Status   01/16/2018 113 40 - 150 U/L Final   10/20/2017 161 (H) 40 - 150 U/L Final   08/14/2017 198 (H) 40 - 150 U/L Final     ALT   Date Value Ref Range Status   01/16/2018 21 0 - 50 U/L Final   10/20/2017 22 0 - 50 U/L Final   08/14/2017 26 0 - 50 U/L Final     Rheumatoid Factor   Date Value Ref Range Status   04/10/2017 <20 <20 IU/mL Final     Recent Labs   Lab Test  01/16/18   0816  10/20/17   0823  08/14/17   0749  08/04/17   1054   12/20/16   1121   10/23/15   1554    10/09/13   0721   WBC  4.9   --   6.2  4.5   < >   --    < >  4.8   < >   --    HGB  13.3   --   12.7  13.9   < >   --    < >  13.3   < >   --    HCT  40.1   --   40.0  44.1   < >   --    < >  39.1   < >   --    MCV  94   --   92  92   < >   --    < >  89   < >   --    PLT  372   --   436  435   < >   --    < >  212   < >   --    BUN  13   --   13  17   < >   --    < >  10   < >   --    TSH   --    --    --    --    --   0.13*   --   0.57   --   1.86   AST  14  23  20  21   < >   --    < >  56*   < >  53*   ALT  21  22  26  28   < >   --    < >  79*   < >  64*   ALKPHOS  113  161*  198*  229*   < >   --    < >  206*   < >  130    < > = values in this interval not displayed.     AMARI undetected  SSA, SSB, cryoglobulin all negative  Normal C3, C4  RF undetected  IGG subclasses with mild IGG4 elevation to 146H -> 89 -> 70s  SPEP with normal pattern and no monoclonal protein seen.    Recent LFT normal with declining Alk Phos. Lowest in past year  IgG4 normalizing    LFTs, CBC, IgG subclasses normal 1/2018.    Other studies:   Tissue biopsy 12/28/2016:  FINAL DIAGNOSIS:   A. Spleen, splenectomy:   - Splenic tissue with no significant histologic abnormality     B. Duodenum and pancreas, resection:   - Chronic pancreatitis   - Duodenum with no significant histologic abnormality     C. Pancreas, uncinate process, biopsy:   - Chronic pancreatitis     D. Liver, dome lesion, needle core biopsy:   - Liver with spindle cell proliferation/lesion, acute and chronic   inflammation and fibrosis   - Focally increased IgG4 positive plasma cells (upto 60/HPF)   - See comment     E. Pancreas, head, biopsy:   - Chronic pancreatitis     F. Pancreas, tail, biopsy:   - Chronic pancreatitis     G. Pancreas, body, biopsy:   - Chronic pancreatitis with fat necrosis     COMMENT:   Sections of the liver lesion show stromal proliferation composed of   bland spindle cell bundle and whorls with associated fibrosis   infiltrated by neutrophils,  lymphocytes, plasma cells and macrophages.   Occasional lymphoid aggregates are noted. The surrounding liver   parenchymal tissue show diffuse moderate portal inflammation composed of   lymphocytes and plasma cells.  There is also a mild to moderate   infiltrate of neutrophils.  Reticulin stain shows preserved reticulin   framework.  Trichrome stains highlight the spindle cell proliferation   and show mild portal fibrosis.  Immunostain are performed with   appropriate controls. The spindle cell proliferation is negative for ALK   and show patchy infiltrate of IgG4 positive plasma cells (upto 60/HPF).   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 Possibility of IgG4-related disease cannot be ruled out.   Clinical and radiologic correlation is recommended.     MRI Abdomen:4/22/2017  IMPRESSION:  1. 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  2. No significant change in small wedge-shaped peripheral areas of  persistent enhancement surrounding mildly dilated biliary radicles,  findings most compatible with cholangitis or sequela of previous  infection.  3. Nonspecific subcutaneous fluid collection in the left upper  quadrant amidst the abdominal wall postsurgical changes.    Reviewed Rheumatology lab flowsheet    MRCP 7/13/2017  IMPRESSION:  1. Findings consistent with hemosiderin deposition within the liver.  2. There is improvement in the heterogeneous enhancement seen on  comparison exam, though patchy areas of increased T2 signal and  persistent enhancement are seen in the liver suggestive of regions of  fibrosis. Mildly dilated bile ducts in the regions of the liver which  appears somewhat fibrotic, overall similar to prior exam.  3. Postsurgical changes of pancreatectomy, splenectomy and  cholecystectomy.    Lip Biopsy 5/31/2017:  FINAL DIAGNOSIS:   Lip biopsy, lower:   -  Benign minor salivary glands with normal lobular architecture,  see   comment     COMMENT:   There is focal mild chronic inflammation.  There is no morphologic   evidence of IgG-4 related disease or Sjogren's disease.

## 2018-03-23 NOTE — MR AVS SNAPSHOT
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 We suspect chest wall pain.   -- see you again in 6 months. Dr. Becerra clinic          Follow-ups after your visit        Follow-up notes from your care team     Return in about 6 months (around 9/23/2018) for IgG4 return.      Your next 10 appointments already scheduled     Mar 28, 2018  9:00 AM CDT   (Arrive by 8:45 AM)   Return Visit with Maria Elena Abdalla, PhD   [facility] ([facility])    [address] [place] [address] [id number] [phone]            Jun 06, 2018  4:20 PM CDT   (Arrive by 4:05 PM)   Return Visit with Vijaya Genao MD   [facility] ([facility])    [address] [place] [address] [id number] [phone]              Who to contact     If you have questions or need follow up information about today's clinic visit or your schedule please contact [facility] RHEUMATOLOGY directly at [phone].  Normal or non-critical lab and imaging results will be communicated to you by MyChart, letter or phone within 4 business days after the clinic has received the results. If you do not hear from us within 7 days, please contact the clinic through MyChart or phone. If you have a critical or abnormal lab result, we will notify you by phone as soon as possible.  Submit refill requests through [employer] or call your pharmacy and they will forward the refill  "request to us. Please allow 3 business days for your refill to be completed.          Additional Information About Your Visit        [employer] Information     [employer] gives you secure access to your electronic health record. If you see a primary care provider, you can also send messages to your care team and make appointments. If you have questions, please call your primary care clinic.  If you do not have a primary care provider, please call [phone] and they will assist you.        Care EveryWhere ID     This is your Care EveryWhere ID. This could be used by other organizations to access your [facility] medical records  [id number]        Your Vitals Were     Pulse Temperature Height Pulse Oximetry BMI (Body Mass Index)       78 97.5  F (36.4  C) (Oral) 1.727 m (5' 8\") 99% 22.53 kg/m2        Blood Pressure from Last 3 Encounters:   03/23/18 114/70   02/28/18 111/64   02/21/18 116/70    Weight from Last 3 Encounters:   03/23/18 67.2 kg (148 lb 3.2 oz)   02/28/18 66.8 kg (147 lb 3.2 oz)   02/21/18 67.2 kg (148 lb 1.6 oz)                 Today's Medication Changes          These changes are accurate as of 3/23/18 10:43 AM.  If you have any questions, ask your nurse or doctor.               These medicines have changed or have updated prescriptions.        Dose/Directions    levothyroxine 125 MCG tablet   Commonly known as:  SYNTHROID/LEVOTHROID   This may have changed:    - how much to take  - how to take this  - additional instructions   Used for:  Itching, Post-pancreatectomy diabetes (H), History of pancreatectomy, Pancreatic insufficiency        Take 1 tablet (125 mcg), by mouth daily before breakfast.   Quantity:  1 tablet   Refills:  0                Primary Care Provider Office Phone # Fax #    Justyna Lawson [phone]       [facility] [place] [address]  [place] [facility] [address]        Equal Access to Services     ABHAY HUITRON AH: Hadii monty Helton, luci suarez, qaybta " ivan seodonavan wallace ah. Rosenda [facility] [phone].    ATENCIÓN: Si freda champagne, tiene a muñoz disposición servicios gratuitos de asistencia lingüística. Howard al [phone].    We comply with applicable federal civil rights laws and [place] laws. We do not discriminate on the basis of race, color, national origin, age, disability, sex, sexual orientation, or gender identity.            Thank you!     Thank you for choosing [facility] RHEUMATOLOGY  for your care. Our goal is always to provide you with excellent care. Hearing back from our patients is one way we can continue to improve our services. Please take a few minutes to complete the written survey that you may receive in the mail after your visit with us. Thank you!             Your Updated Medication List - Protect others around you: Learn how to safely use, store and throw away your medicines at www.disposemymeds.org.          This list is accurate as of 3/23/18 10:43 AM.  Always use your most recent med list.                   Brand Name Dispense Instructions for use Diagnosis    acetaminophen 500 MG Caps      Take 2 mg by mouth 2 times daily        * amitriptyline 10 MG tablet    ELAVIL     Take 5 tablets by mouth        * amitriptyline 10 MG tablet    ELAVIL    150 tablet    Take 5 tablets (50 mg) by mouth At Bedtime    Headache disorder       * amitriptyline 50 MG tablet    ELAVIL    60 tablet    Take 1 tablet (50 mg) by mouth At Bedtime    Headache disorder       * amitriptyline 10 MG tablet    ELAVIL    60 tablet    Take 1 tablet (10 mg) by mouth At Bedtime    Headache disorder       amylase-lipase-protease [id number] UNITS Cpep per EC capsule    CREON 24    450 capsule    Take 3-4 capsules by mouth with meals and 1-2 with snacks. Maximum 15 capsules per day.    Acquired total absence of pancreas       aspirin 81 MG EC tablet     90 tablet    Take 1 tablet (81 mg) by mouth daily    High platelet count (H)        azelastine 0.1 % spray    ASTELIN     Spray 1 spray into both nostrils 2 times daily        BD SHARPS  Misc     1 each    1 Container as needed    Post-pancreatectomy diabetes (H)       blood glucose monitoring lancets     1 Box    Use to test blood sugar 6-8 times daily or as directed.    Acute on chronic pancreatitis (H)       blood glucose monitoring test strip    PAT CONTOUR NEXT    2 Box    Use to test blood sugar 6 times daily or as directed.    Post-pancreatectomy diabetes (H)       celecoxib 100 MG capsule    celeBREX    60 capsule    Take 1 capsule (100 mg) by mouth 2 times daily    Chronic abdominal pain       cetirizine 10 MG tablet    zyrTEC     Take 10 mg by mouth daily    Elevated liver enzymes       CONTOUR NEXT EZ MONITOR W/DEVICE Kit      1 Device 6 times daily    Itching, Post-pancreatectomy diabetes (H), History of pancreatectomy, Pancreatic insufficiency       diphenhydrAMINE 25 MG capsule    BENADRYL ALLERGY    56 capsule    Take 1 capsule (25 mg) by mouth every 6 hours as needed for itching or allergies    Itching, Post-pancreatectomy diabetes (H), History of pancreatectomy, Pancreatic insufficiency       docusate sodium 100 MG capsule    COLACE    60 capsule    Take 1 capsule (100 mg) by mouth 2 times daily as needed for constipation,    Itching, Post-pancreatectomy diabetes (H), History of pancreatectomy, Pancreatic insufficiency       glucagon 1 MG kit    GLUCAGON EMERGENCY    1 mg    Inject 1 mg into the muscle once for 1 dose    Post-pancreatectomy diabetes (H)       glucose 40 % Gel gel     3 Tube    Take 15-30 g by mouth every 15 minutes as needed for low blood sugar    Acquired total absence of pancreas       Injection Device for insulin Tika    NOVOPEN ECHO    1 each    1 each daily as needed    Post-pancreatectomy diabetes (H)       insulin aspart 100 UNIT/ML injection    NovoLOG PENFILL    3 mL    Administer subcutaneously 0.5 unit per 45 grams of carbohydrates.     Post-pancreatectomy diabetes (H)       insulin pen needle 32G X 4 MM    BD KEN U/F    100 each    Use 6-8 times per day    Acquired total absence of pancreas       LANsoprazole 30 MG CR capsule    PREVACID    30 capsule    Take 1 capsule (30 mg) by mouth daily Take 30-60 minutes before a meal.    Post-pancreatectomy diabetes (H), Pancreatic insufficiency       levothyroxine 125 MCG tablet    SYNTHROID/LEVOTHROID    1 tablet    Take 1 tablet (125 mcg), by mouth daily before breakfast.    Itching, Post-pancreatectomy diabetes (H), History of pancreatectomy, Pancreatic insufficiency       * lubiprostone 24 MCG capsule    AMITIZA     Take 2-3 capsules by mouth        * Lubiprostone 8 MCG Caps capsule    AMITIZA    150 capsule    Take 5 capsules (40 mcg) by mouth daily Take 3 capsules in AM and 2 capsules in PM    Other constipation       multivitamin CF formula capsule    CHOICEFUL     Take 1 tablet by mouth daily    Itching, Post-pancreatectomy diabetes (H), History of pancreatectomy, Pancreatic insufficiency       order for DME     1 Device    Equipment being ordered:Cefaly    Headache disorder       prochlorperazine 5 MG tablet    COMPAZINE    90 tablet    Take two 5 mg tablets by mouth every six hours as needed for nausea.    Itching, Post-pancreatectomy diabetes (H), History of pancreatectomy, Pancreatic insufficiency       senna-docusate 8.6-50 MG per tablet    SENOKOT-S;PERICOLACE    100 tablet    Take 1 tablet by mouth 2 times daily as needed for constipation    Itching, Post-pancreatectomy diabetes (H), History of pancreatectomy, Pancreatic insufficiency       VAGIFEM 10 MCG Tabs vaginal tablet   Generic drug:  estradiol           ZOMIG 5 MG tablet   Generic drug:  ZOLMitriptan      Take 5 mg by mouth at onset of headache for migraine        * Notice:  This list has 6 medication(s) that are the same as other medications prescribed for you. Read the directions carefully, and ask your doctor or other care  provider to review them with you.

## 2018-03-23 NOTE — LETTER
3/23/2018       RE: Dinora Mcghee  [address] [facility] [address]     Dear Colleague,    Thank you for referring your patient, Dinora Mcghee, to the [facility] RHEUMATOLOGY at [facility]. Please see a copy of my visit note below.    Rheumatology Clinic Visit-Fellow Note     Dinora Mcghee MRN# [id number]   YOB: 1965 Age: 52 year old     Date of Visit: 03/23/2018   Last seen: 10/20/2017  Primary care provider: Justyna Lawson  Referring Provider: Dr. Ara Lugo with GI  Reason for Referral: Further evaluation and management for possible Sjogren's vs IgG4 related disease in patient with sicca symptoms and evidence of isolated? IgG4 in liver/pancreas.           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   # h/o endometriosis  # h/o pituitary mass with 2/2 DI now s/p transsphenoidal resection in 1990  # hypothyroidism on synthroid.  # Fatigue in ill state  # left chest wall pain    Discussion:  53 y/o female with longstanding history of chronic pancreatitis s/p pancreatectomy with islet autotransplant 12/2016 who is overall improving but found to have elevated serum and > 60 HPF cells on liver biopsy staining. She has sicca symptoms for the past 5 years but no evidence of Sjogren's vs IgG4 related disease on lip biopsy. No other evidence of IgG4 related activity with no RPF, aortitis, orbital disease, thyroid, pulmonary, or kidney disease. Interestingly, IgG4 disease can impact the hypophysis and she does have a history of pituitary issues back in 1990. Recent meta analysis assessing IgG4 serum sensitivity is 87.2% with 82% specificity (1). Her biopsy containing > 50 IgG4 staining plasma cells is highly suggestive  of IgG4 related disease as well and meets recent proposed pathologic recommendations of IgG4 related disease (2). She meets many of the features for IgG4 related disease including tissue biopsy.     In regards to management of her IgG4 Related disease there are no randomized control trials and all medicines are investigational. The most recent evidence highlights that the IgG4 antibodies are pathologic and that medications that suppress B lymphocytes like CD-20 cells (Rituximab) creating the antibodies are the preferred therapy. This is especially important if the organ dysfunction can be a critical organ like the liver in a patient with recent surgeries and transplant. Currently her liver dysfunction has normalized and there is no evidence of ongoing IgG4 related disease at this time.     Overall, we agree with the high levels of stress and degree of fatigue with worsening HA during active work we recommend she go very slow with her part time work. She has been severely ill and it will take time to regain and retrain her stamina. She has ongoing left chest wall pain that has been tolerable.     1. Daniel Barber et al.  Diagnostic Value of Serum IgG4 for IgG4-Related Disease: A MADELYN-Compliant Systematic Review and [place]-Analysis.  Ed. Lj Quinn. Medicine 95.21 (2016): e3785. MyClasses. Web. 17 May 2017.  2. Annabel V, Ever Y, Donovan JK, et al. Consensus statement on the pathology of IgG4-RD. Mod Pathol 2012;64:3061-7.    Plan:  -- LFTs, TSH, T4, and IgG subclasses  -- agree with plan to continue to monitor off immune suppressives given ongoing stability in liver labs and IgG4 levels.   -- If liver dysfunction returns would need to decide with GI if we suspect related to IgG4 and if so then therapeutic options for IgG4 related disease would normally be Rituximab preferred (but has steroids (can cause irreversible damage to islet cells) with infusion) vs MMF (no steroids but not ideal agent).  -- Patient deferred  initiating Evoxac or restasis at this point in time.  -- discussed various lozenges that help sicca symptoms  -- for her left chest wall, if persistent then agents to aid could be lidocaine patch, topical voltaren, or topical lidocaine.  -- she will need slow reintroduction into work as she was severely ill.    Follow up:  RTC in 6 months, unless new symptoms emerge. Dr. Becerra clinic    Prevention:  Pneumovax PPSV23: 12/2016  PCV13 (Prevnar): not in [facility]  Tdap: 1/7/2008?  Influenza received 10/20/17  Shingles vaccine: not recieved  Reminded if on biologic no live vaccine (flu mist, shingles): n/a  HBVsAg: negative  HBVcore: ordered  HCV: negative   Q gold (or T spot): ordered  Bactrim prophylaxis: n/a  Fungal prophylaxis: n/a  Vitamin D and calcium/bone health: Ca and Vitamin D    Immunizations:  Immunization History   Administered Date(s) Administered     Hib (PRP-T) 12/01/2016     Influenza (IIV3) PF 09/29/2011, 11/15/2014, 10/21/2016     Influenza (intradermal) 11/01/2003, 09/29/2011     Influenza Vaccine IM 3yrs+ 4 Valent IIV4 10/24/2015, 10/21/2016, 10/20/2017     Influenza Vaccine, 3 YRS +, IM (QUADRIVALENT W/PRESERVATIVES) 11/04/2013     Meningococcal (Bexsero ) 12/09/2016     Meningococcal (Menactra ) 12/01/2016     Pneumococcal 23 valent 12/01/2016     TD (ADULT, 7+) 07/09/1998     TDAP Vaccine (Adacel) 01/07/2008     Discussed with attending Dr. Becerra, who agrees with the above assessment and plan.    Jesse Richardson DO  Rheumatology Fellow  Pager: [phone]      Attending Note: I saw and evaluated the patient with Dr. Richardson. I agree with the assessment and plan.    Anita Becerra MD            Active Problem List:     Patient Active Problem List    Diagnosis Date Noted     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 06/26/2017     Priority: Medium     Gastroparesis      Priority: Medium     ACP (advance care planning) 03/28/2017     Priority: Medium     Advance Care Planning 3/28/2017: Receipt of ACP document:   Received: Health Care Directive which was witnessed or notarized on 12/8/16.  Document previously scanned on 1/12/17.  Validation form completed and scanned.  Code Status reflects choices in most recent ACP document..  Confirmed/documented designated decision maker(s).  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     Priority: Medium     Acute on chronic pancreatitis (H) 09/03/2016     Priority: Medium     Acute abdominal pain 08/02/2016     Priority: Medium     Abdominal pain, epigastric 10/23/2015     Priority: Medium     Severe protein-calorie malnutrition (H) 06/09/2015     Priority: Medium     Acute pancreatitis 06/03/2015     Priority: Medium     Abdominal pain 06/02/2015     Priority: Medium     S/P ERCP 06/02/2015     Priority: Medium     Abdominal pain, generalized 04/20/2015     Priority: Medium     History of ERCP 04/20/2015     Priority: Medium     Other type of intractable migraine      Priority: Medium     Diagnosis updated by automated process. Provider to review and confirm.       GERD (gastroesophageal reflux disease) 12/01/2010     Priority: Medium     Lumbago 04/18/2005     Priority: Medium     History of L5-S1 degenerative disk disease.         Sprain and strain of other specified sites of hip and thigh 12/09/2002     Priority: Medium     Chondromalacia of patella 12/09/2002     Priority: Medium     Need for prophylactic immunotherapy      Priority: Medium     trees, grass, srw, dust mites, cat       Allergic rhinitis due to other allergen 12/21/2001     Priority: Medium     Hypothyroidism      Priority: Medium      "Problem list name updated by automated process. Provider to review       Sensorineural hearing loss 01/10/2005     Priority: Medium     Problem list name updated by automated process. Provider to review       Vertiginous syndrome and labyrinthine disorder 01/10/2005     Priority: Medium     Problem list name updated by automated process. Provider to review              History of Present Illness:   Dinora Mcghee is a 52 year old female with a past medical history of hypothyroidism, h/o pituitary mass and secondary DI s/p transphenoidal resection 1990, HLD, left knee OA, lumbar DDD, chronic pancreatitis for 30 years, now s/p islet cell transplantation who presents today for further evaluation of elevated IgG4 found both in serum and liver biopsy as well as sicca symptoms.     She notes that she has had longstanding issues with pancreatitis without any mention of aortitis, RPF, urinary obstruction. She denies any history of cancers, lymphoma or leukemia. No family history of autoimmune conditions other than possible RA in her dad. She had distant DI secondary from pituitary mass (s/p removal and not needing hormones) and joint wise has longstanding OA of left knee. She mentions that for the past 5 years she has had dry eye requiring eye drops. Sometimes related to her allergies she has to now regularly use eye drops. \"Feels like sand\" is rubbing in eyes. She also noticed ~ 5 years ago dry mouth where a cracker would not dissolve. She has to constantly carry water and drink it to keep her mouth dry. She uses sugar free gum and lozenges. Most of her clinical symptom burden has been related to her pancreas with frequent ERCP and concern for Sphincter of Oddi dysfunction. She had issues with significant pancreatic insuffiencey with malnutrition and weight loss. She underwent islet cell autotransplantation 12/28/2017 and was found to have elevated IgG4 cells in her liver biopsy. Subsequent IgG4 levels were " elevated.    Interim history: 6/20/2017 - 8/4/2017  Comes in today frustrated about the multiple denials from the insurance about Rituximab for her IgG4 Related disease. She has had recent elevations of her LFTs and mentions that the last 3 days she has started to feel worse like before her pancreatectomy. Some nausea, no vomiting, but her appetite is lower. She denies any jaundice, but does not itching of her skin. She denies any fevers, chills, but has had 1 day of watery, nonbloody diarrhea. She had a recent MRCP with signs of biliary dilatation at ducts within the liver. She has a new hyperenhancement lesion in her liver with stability of other lesions. She is following with Dr. Lugo within the next week. She mentions that her dry eyes are getting worse.     Interim history: 8/4/2017 - 10/20/2017  After further discussion with GI and SOT regarding therapies Dinora's LFTs normalized and the consensus was to monitor and hold on immune suppressive therapies given the side effect profile. She has continued to improve and is now off insulin. She wonders about her known slow emptying an gastroparesis has been acting up and she has had issues with N/V the past 2 weeks particularly after she lays down (worse in AM, but best midday). Otherwise things have been going well, denies any infections and her shoulder is almost back to normal.     Interim History: 10/20/2017 - 3/23/2018  Returns today feeling better. She has adjusted her diet with great improvement. Has noticed some fatigue the last several weeks, but denies any infectious symptoms. Has intermittent sharp pains in her left lower rib chest wall. This has been responding to tylenol. Denies any fevers or chills. She has noticed fatigue and tries to be very active with walking 3 miles. She mentions that she has to take naps now she never had before. She has been back part time as well that has worsened her control over her HA as well as her stamina levels.           Review of Systems:   Constitutional: denies fevers/chills, positive fatigue, patient is having weight gain s/p transplant.  Skin: denies rash, raynauds or alopecia  Eyes: denies hx of uvetitis, double vision, positive dry eyes  Ears/Nose/Throat:  denies recurrent URI or sinusitis, positive dry mouth, denies any oral or nasal ulcers  Respiratory: No shortness of breath, dyspnea on exertion, cough, or hemoptysis  Cardiovascular:  denies chest pain, palpitations, hx pleurisy  Gastrointestinal: denies diarrhea, blood in stool, hx of IBD, positive past pancreatitis, positive nausea, rare vomiting, positive RUQ pain.  Genitourinary: denies hematuria.  Musculoskeletal: denies red, tender, swollen joints, left shoulder frozen shoulder since surgery almost normal.  Neurologic:  denies headaches, seizures, some numbness or tingling s/p abdominal surgery.  Psychiatric:  denies history of depression or mental illness  Hematologic/Lymphatic/Immunologic:  denies history of blood clots, easy bruising, bleeding.  Endocrine:  Positive hypothyroidism.          Past Medical History:     Past Medical History:   Diagnosis Date     Allergic rhinitis, cause unspecified      Allergy to other foods     strawberries, apples, celeries, alice, watermelon     Arthritis     left knee     Choledocholithiasis     long after cholecystectomy     Chronic abdominal pain      Chronic constipation      Chronic nausea      Chronic pancreatitis (H)      Degeneration of lumbar or lumbosacral intervertebral disc      Esophageal reflux     w/ hiatal hernia     Gastroparesis      Hiatal hernia      History of pituitary adenoma     s/p resection     Hypothyroidism      Migraines      Mild hyperlipidemia      On tube feeding diet     presence of GJ tube     Pancreatic disease     PD stricture, 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    Past Surgical History  Past Surgical History:   Procedure Laterality Date     ABDOMEN SURGERY      c sections: 93, 96, 98. endometriosis growth     APPENDECTOMY       C  DELIVERY ONLY       C  DELIVERY ONLY      repeat c section with incidental cystotomy with repair     C EXCIS PITUITARY,TRANSNASAL/SEPTAL      pituitary tumor removed for diabetes insipidus     C TOTAL ABDOM HYSTERECTOMY      w/ bilateral salpingoophorectomy      C WRIST ARTHROSCOP,RELEASE XVERS LIG Bilateral 08      SECTION       COLONOSCOPY       ENDOSCOPIC RETROGRADE CHOLANGIOPANCREATOGRAM N/A 2015    Procedure: ENDOSCOPIC RETROGRADE CHOLANGIOPANCREATOGRAM;  Surgeon: Mandeep Park MD;  Location: [facility]     ENDOSCOPIC RETROGRADE CHOLANGIOPANCREATOGRAM N/A 2016    Procedure: COMBINED ENDOSCOPIC RETROGRADE CHOLANGIOPANCREATOGRAPHY, PLACE TUBE/STENT;  Surgeon: Mandeep Park MD;  Location: [facility]     ENDOSCOPIC RETROGRADE CHOLANGIOPANCREATOGRAM N/A 3/17/2016    Procedure: COMBINED ENDOSCOPIC RETROGRADE CHOLANGIOPANCREATOGRAPHY, REMOVE FOREIGN BODY OR STENT/TUBE;  Surgeon: Mandeep Park MD;  Location: [facility]     ENDOSCOPIC RETROGRADE CHOLANGIOPANCREATOGRAM N/A 2016    Procedure: COMBINED ENDOSCOPIC RETROGRADE CHOLANGIOPANCREATOGRAPHY, PLACE TUBE/STENT;  Surgeon: Mandeep Park MD;  Location: [facility]     ENDOSCOPIC RETROGRADE CHOLANGIOPANCREATOGRAM N/A 2016    Procedure: COMBINED ENDOSCOPIC RETROGRADE CHOLANGIOPANCREATOGRAPHY, REMOVE FOREIGN BODY OR STENT/TUBE;  Surgeon: Mandeep Park MD;  Location: [facility]     ENDOSCOPIC ULTRASOUND UPPER GASTROINTESTINAL TRACT (GI) N/A 10/3/2016    Procedure: ENDOSCOPIC ULTRASOUND, ESOPHAGOSCOPY / UPPER GASTROINTESTINAL TRACT (GI);  Surgeon: Guru Jose Rodas MD;  Location: [facility]     ESOPHAGOSCOPY, GASTROSCOPY, DUODENOSCOPY (EGD), COMBINED N/A  6/24/2015    Procedure: COMBINED ESOPHAGOSCOPY, GASTROSCOPY, DUODENOSCOPY (EGD), REMOVE FOREIGN BODY;  Surgeon: Mandeep Park MD;  Location: [facility]     ESOPHAGOSCOPY, GASTROSCOPY, DUODENOSCOPY (EGD), COMBINED N/A 10/25/2015    Procedure: COMBINED ESOPHAGOSCOPY, GASTROSCOPY, DUODENOSCOPY (EGD);  Surgeon: Sammy Amaro MD;  Location: [facility]     ESOPHAGOSCOPY, GASTROSCOPY, DUODENOSCOPY (EGD), COMBINED N/A 10/25/2015    Procedure: COMBINED ESOPHAGOSCOPY, GASTROSCOPY, DUODENOSCOPY (EGD), BIOPSY SINGLE OR MULTIPLE;  Surgeon: Sammy Amaro MD;  Location: [facility]     ESOPHAGOSCOPY, GASTROSCOPY, DUODENOSCOPY (EGD), DILATATION, COMBINED       EXCISE LESION TRUNK N/A 4/17/2017    Procedure: EXCISE LESION TRUNK;  Removal of Abdominal Foreign Body;  Surgeon: Nestor Phoenix MD;  [facility]     HC ESOPH/GAS REFLUX TEST W NASAL IMPED >1 HR N/A 11/19/2015    Procedure: ESOPHAGEAL IMPEDENCE FUNCTION TEST WITH 24 HOUR PH GREATER THAN 1 HOUR;  Surgeon: Thiago Apple MD;  Location: [facility]     HC U[facility] ENDOSCOPY DIAG W BIOPSY  9/17/08     HC UGI ENDOSCOPY DIAG W BIOPSY  9/27/12     HC UGI ENDOSCOPY W ESOPHAGEAL DILATION BALLOON <30MM  9/17/08     HC UGI ENDOSCOPY W EUS N/A 5/5/2015    Procedure: COMBINED ENDOSCOPIC ULTRASOUND, ESOPHAGOSCOPY, GASTROSCOPY, DUODENOSCOPY (EGD);  Surgeon: Wm Dueñas MD;  Location: [facility]     INJECT TRANSVERSUS ABDOMINIS PLANE (TAP) BLOCK BILATERAL Left 9/22/2016    Procedure: INJECT TRANSVERSUS ABDOMINIS PLANE (TAP) BLOCK BILATERAL;  Surgeon: Dickson Corrigan MD;  [facility]     laparoscopic pineda  1995     LAPAROSCOPIC PANCREATECTOMY, TRANSPLANT AUTO ISLET CELL N/A 12/28/2016    Procedure: LAPAROSCOPIC PANCREATECTOMY, TRANSPLANT AUTO ISLET CELL;  Surgeon: Nestor Phoenix MD;  Location: [facility]     transphenoidal pituitary resection  1990            Social History:     Social History     Occupational History     director [employer]     Social History Main Topics      Smoking status: Former Smoker     Packs/day: 0.50     Years: 6.00     Types: Cigarettes     Start date: 9/1/1985     Quit date: 1/1/1992     Smokeless tobacco: Never Used      Comment: no 2nd hand     Alcohol use No      Comment: last drink 6/2016  - moderate social     Drug use: No     Sexual activity: Yes     Partners: Male     Birth control/ protection: None      Comment: Hystectomy 1999   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 6 years of smoking and quit in 1992. Denies any ETOH.  and lives in the [facility].          Family History:     Family History   Problem Relation Age of Onset     Eye Disorder Father      cataract, detached retina     Myocardial Infarction Father 60     Lipids Father      CEREBROVASCULAR DISEASE Father      Depression Father      Substance Abuse Father      Anesthesia Reaction Father      stroke right after surgery     Lipids Mother      Hypertension Mother      Thyroid Disease Mother      Eye Disorder Son      ptosis     Eye Disorder Paternal Grandmother      cataract     Eye Disorder Paternal Grandfather      cataract     DIABETES Paternal Grandfather      Eye Disorder Maternal Grandmother      cataract     Thyroid Disease Maternal Grandmother      Coronary Artery Disease Sister      Depression Sister      Depression Son      Anxiety Disorder Son      Thyroid Disease Sister      DIABETES Maternal Grandfather      Depression Nephew      Anxiety Disorder Nephew      Thyroid Disease Nephew      Type 2 Diabetes Cousin      paternal cousin     Father with likely RA following with multiple rheumatologists. 3 children healthy. 1 sister with thyroid issues. Denies any Sjogren's SLE, Scleroderma.            Allergies:     Allergies   Allergen Reactions     Apple Anaphylaxis     Depakote [Divalproex Sodium] Other (See Comments)     Chest pain     Zithromax [Azithromycin Dihydrate] Anaphylaxis     Noted in 4/7/08 ER     Darvocet [Propoxyphene N-Apap] Itching      Morphine Nausea and Vomiting and Rash     Nalbuphine Hcl Rash     RASH :nubaine     Zosyn [Piperacillin-Tazobactam In D5w] Rash     Possible allergy, did have a diffuse rash that seemed drug related but could have also been related to soap in the hospital.      Bactrim [Sulfamethoxazole W-Trimethoprim] Other (See Comments) and Nausea and Vomiting     Severely low liver function.     Cats      Compazine [Prochlorperazine] Other (See Comments)     Twitching. Takes Benedryl and is fine     Corticosteroids Other (See Comments)     All oral,IV and injectable steroids are contraindicated (unless in life threatening situations) in Islet Auto transplant recipients. They can cause irreversible loss of islet cell function. Please contact patients transplant care coordinator Mecca Watkins RN BSN @ [phone]/Pager [phone], and Endocrinologist prior to administration.     Dust Mites      Grass      Prednisone Other (See Comments)     Insomnia       Ragweeds      Tape [Adhesive Tape] Blisters     Trees      Zofran [Ondansetron] Other (See Comments)     migraine     Flagyl [Metronidazole] Hives and Rash            Medications:     Current Outpatient Prescriptions   Medication Sig Dispense Refill     amylase-lipase-protease (CREON 24) 67342-28642 UNITS CPEP per EC capsule Take 3-4 capsules by mouth with meals and 1-2 with snacks. Maximum 15 capsules per day. 450 capsule 6     Lubiprostone (AMITIZA) 8 MCG CAPS capsule Take 5 capsules (40 mcg) by mouth daily Take 3 capsules in AM and 2 capsules in  capsule 3     amitriptyline (ELAVIL) 10 MG tablet Take 5 tablets by mouth       lubiprostone (AMITIZA) 24 MCG capsule Take 2-3 capsules by mouth       amitriptyline (ELAVIL) 50 MG tablet Take 1 tablet (50 mg) by mouth At Bedtime 60 tablet 3     amitriptyline (ELAVIL) 10 MG tablet Take 1 tablet (10 mg) by mouth At Bedtime 60 tablet 3     order for DME Equipment being ordered:Cefaly 1 Device 0     amitriptyline (ELAVIL) 10 MG  tablet Take 5 tablets (50 mg) by mouth At Bedtime 150 tablet 3     ZOLMitriptan (ZOMIG) 5 MG tablet Take 5 mg by mouth at onset of headache for migraine       azelastine (ASTELIN) 0.1 % spray Spray 1 spray into both nostrils 2 times daily       acetaminophen 500 MG CAPS Take 2 mg by mouth 2 times daily       LANsoprazole (PREVACID) 30 MG CR capsule Take 1 capsule (30 mg) by mouth daily Take 30-60 minutes before a meal. 30 capsule 11     celecoxib (CELEBREX) 100 MG capsule Take 1 capsule (100 mg) by mouth 2 times daily 60 capsule 1     estradiol (VAGIFEM) 10 MCG TABS vaginal tablet        blood glucose monitoring (PAT CONTOUR NEXT) test strip Use to test blood sugar 6 times daily or as directed. 2 Box 11     insulin pen needle (BD KEN U/F) 32G X 4 MM Use 6-8 times per day 100 each 11     cetirizine (ZYRTEC) 10 MG tablet Take 10 mg by mouth daily       insulin aspart (NOVOLOG PENFILL) 100 UNIT/ML injection Administer subcutaneously 0.5 unit per 45 grams of carbohydrates. 3 mL 3     senna-docusate (SENOKOT-S;PERICOLACE) 8.6-50 MG per tablet Take 1 tablet by mouth 2 times daily as needed for constipation 100 tablet      prochlorperazine (COMPAZINE) 5 MG tablet Take two 5 mg tablets by mouth every six hours as needed for nausea. 90 tablet 3     diphenhydrAMINE (BENADRYL ALLERGY) 25 MG capsule Take 1 capsule (25 mg) by mouth every 6 hours as needed for itching or allergies 56 capsule 0     levothyroxine (SYNTHROID/LEVOTHROID) 125 MCG tablet Take 1 tablet (125 mcg), by mouth daily before breakfast. (Patient taking differently: Take 137 mcg by mouth Take 1 tablet (125 mcg), by mouth daily before breakfast.) 1 tablet 0     docusate sodium (COLACE) 100 MG capsule Take 1 capsule (100 mg) by mouth 2 times daily as needed for constipation, 60 capsule 0     multivitamin CF formula (CHOICEFUL) capsule Take 1 tablet by mouth daily  11     Blood Glucose Monitoring Suppl (CONTOUR NEXT EZ MONITOR) W/DEVICE KIT 1 Device 6 times  "daily       aspirin 81 MG EC tablet Take 1 tablet (81 mg) by mouth daily 90 tablet 3     Injection Device for insulin (NOVOPEN ECHO) MARCO 1 each daily as needed 1 each 0     glucose 40 % GEL gel Take 15-30 g by mouth every 15 minutes as needed for low blood sugar 3 Tube 2     Sharps Container (BD SHARPS ) MISC 1 Container as needed 1 each 1     blood glucose monitoring (PAT MICROLET) lancets Use to test blood sugar 6-8 times daily or as directed. 1 Box prn     glucagon (GLUCAGON EMERGENCY) 1 MG kit Inject 1 mg into the muscle once for 1 dose 1 mg 1            Physical Exam:   Blood pressure 114/70, pulse 78, temperature 97.5  F (36.4  C), temperature source Oral, height 1.727 m (5' 8\"), weight 67.2 kg (148 lb 3.2 oz), SpO2 99 %, not currently breastfeeding.  Wt Readings from Last 4 Encounters:   03/23/18 67.2 kg (148 lb 3.2 oz)   02/28/18 66.8 kg (147 lb 3.2 oz)   02/21/18 67.2 kg (148 lb 1.6 oz)   01/16/18 67.6 kg (149 lb 1.6 oz)     Ideal body weight: 63.9 kg (140 lb 14 oz)  Adjusted ideal body weight: 65.2 kg (143 lb 12.9 oz)    Constitutional: well-developed, appearing stated age; cooperative  Eyes: nl EOM, conjunctiva, sclera. No icterus.   ENT: nl external ears, nose, lips, teeth, gums, throat  No mucous membrane lesions, dry saliva pool, no parotid enlargement  Neck: no mass or thyroid enlargement  Resp: lungs clear to auscultation,  CV: RRR, no murmurs, rubs or gallops, no edema  GI: no ABD mass but Left chest wall pain per MSK below. No Right sided or Lower quadrant pain.  Lymph: no cervical, supraclavicular, epitrochlear nodes    MS: All TMJ, neck, shoulder, elbow, wrist, MCP/PIP/DIP, spine, hip, knee, ankle, and foot MTP/IP joints were examined.   Soft tissue exam - No specific tenderness to palpation of soft tissue points on examination today     Neck - normal range of motion  Shoulder -  Right shoulder with normal ROM in full abduction/adduction and internal/external rotation without pain. " Left shoulder much improved abduction and IR and ER on both passive and active ROM. No glenohumeral effusion/warmth, no tenderness bilaterally.   Chest Wall: left inner rib chest wall pain with reproducible, sharp tenderness over healed incision site.   Elbow - full ROM and no joint effusion on exam; nontender bilaterally   Wrist - full ROM and no joint effusion on exam; nontender bilaterally  Hand - T0S0 of bilateral DIPs, PIPs, MCP joints.   Back - no specific spinal or paraspinal tenderness present  Hip - full ROM; nontender bilaterally  Knee - no joint effusion or joint line tenderness on exam; normal range of motion.  Ankle -  Left and right ankle joint lines without swelling or tenderness. Non-tender to palpation  Foot - no focal pain or synovitis on palpation of the MTPs bilaterally    Skin: no nail pitting, alopecia, rash, nodules or lesions  Neuro: nl cranial nerves, strength in both proximal and distal UE and LE.   Psych: nl judgement, orientation, memory, affect.         Data:     Results for orders placed or performed in visit on 01/16/18   Glucose   Result Value Ref Range    Glucose 97 70 - 99 mg/dL   C-peptide Level   Result Value Ref Range    C Peptide 1.1 0.9 - 6.9 ng/mL   Glucose   Result Value Ref Range    Glucose 134 (H) 70 - 99 mg/dL   C-peptide Level   Result Value Ref Range    C Peptide 4.3 0.9 - 6.9 ng/mL   Glucose   Result Value Ref Range    Glucose 79 70 - 99 mg/dL   C-peptide Level   Result Value Ref Range    C Peptide 2.3 0.9 - 6.9 ng/mL     *Note: Due to a large number of results and/or encounters for the requested time period, some results have not been displayed. A complete set of results can be found in Results Review.       Recent Labs   Lab Test  04/25/17   1355  04/04/17   0757  02/21/17   1315   12/29/16   0620   06/06/15   1903   10/23/12   1451   WBC  4.4  5.0  4.7   < >  10.7   < >   --    < >   --    RBC  4.38  4.04  4.28   < >  3.13*   < >   --    < >   --    HGB  11.1*  10.4*   11.6*   < >  9.1*   < >   --    < >   --    HCT  36.1  33.4*  37.1   < >  27.7*   < >   --    < >   --    MCV  82  83  87   < >  89   < >   --    < >   --    RDW  17.5*  15.8*  13.0   < >  13.2   < >   --    < >   --    PLT  543*  482*  733*   < >  107*   < >   --    < >   --    ALBUMIN  3.7  3.2*  3.7   < >  2.9*   < >  2.8*   < >  4.4   CRP   --    --    --    --   90.0*   --   71.0*   --   7.2   BUN  16  17  13   < >  7   < >  2*   < >   --     < > = values in this interval not displayed.      Recent Labs   Lab Test  12/20/16   1121  10/23/15   1554  10/09/13   0721   06/25/09   1423   TSH  0.13*  0.57  1.86   < >  0.37*   T4  1.17   --    --    --   1.17    < > = values in this interval not displayed.     Hemoglobin   Date Value Ref Range Status   01/16/2018 13.3 11.7 - 15.7 g/dL Final   08/14/2017 12.7 11.7 - 15.7 g/dL Final   08/04/2017 13.9 11.7 - 15.7 g/dL Final     Urea Nitrogen   Date Value Ref Range Status   01/16/2018 13 7 - 30 mg/dL Final   08/14/2017 13 7 - 30 mg/dL Final   08/04/2017 17 7 - 30 mg/dL Final     Creatinine   Date Value Ref Range Status   05/20/2017 0.6 0.52 - 1.04 mg/dL Final     Sed Rate   Date Value Ref Range Status   10/23/2012 11 0 - 20 mm/h Final   07/27/2006 9 0 - 20 mm/h Final     CRP Inflammation   Date Value Ref Range Status   12/29/2016 90.0 (H) 0.0 - 8.0 mg/L Final   06/06/2015 71.0 (H) 0.0 - 8.0 mg/L Final   10/23/2012 7.2 0.0 - 8.0 mg/L Final     AST   Date Value Ref Range Status   01/16/2018 14 0 - 45 U/L Final   10/20/2017 23 0 - 45 U/L Final   08/14/2017 20 0 - 45 U/L Final     Albumin   Date Value Ref Range Status   01/16/2018 3.5 3.4 - 5.0 g/dL Final   10/20/2017 3.7 3.4 - 5.0 g/dL Final   08/14/2017 3.3 (L) 3.4 - 5.0 g/dL Final     Alkaline Phosphatase   Date Value Ref Range Status   01/16/2018 113 40 - 150 U/L Final   10/20/2017 161 (H) 40 - 150 U/L Final   08/14/2017 198 (H) 40 - 150 U/L Final     ALT   Date Value Ref Range Status   01/16/2018 21 0 - 50 U/L Final    10/20/2017 22 0 - 50 U/L Final   08/14/2017 26 0 - 50 U/L Final     Rheumatoid Factor   Date Value Ref Range Status   04/10/2017 <20 <20 IU/mL Final     Recent Labs   Lab Test  01/16/18   0816  10/20/17   0823  08/14/17   0749  08/04/17   1054   12/20/16   1121   10/23/15   1554   10/09/13   0721   WBC  4.9   --   6.2  4.5   < >   --    < >  4.8   < >   --    HGB  13.3   --   12.7  13.9   < >   --    < >  13.3   < >   --    HCT  40.1   --   40.0  44.1   < >   --    < >  39.1   < >   --    MCV  94   --   92  92   < >   --    < >  89   < >   --    PLT  372   --   436  435   < >   --    < >  212   < >   --    BUN  13   --   13  17   < >   --    < >  10   < >   --    TSH   --    --    --    --    --   0.13*   --   0.57   --   1.86   AST  14  23  20  21   < >   --    < >  56*   < >  53*   ALT  21  22  26  28   < >   --    < >  79*   < >  64*   ALKPHOS  113  161*  198*  229*   < >   --    < >  206*   < >  130    < > = values in this interval not displayed.     AMARI undetected  SSA, SSB, cryoglobulin all negative  Normal C3, C4  RF undetected  IGG subclasses with mild IGG4 elevation to 146H -> 89 -> 70s  SPEP with normal pattern and no monoclonal protein seen.    Recent LFT normal with declining Alk Phos. Lowest in past year  IgG4 normalizing    LFTs, CBC, IgG subclasses normal 1/2018.    Other studies:   Tissue biopsy 12/28/2016:  FINAL DIAGNOSIS:   A. Spleen, splenectomy:   - Splenic tissue with no significant histologic abnormality     B. Duodenum and pancreas, resection:   - Chronic pancreatitis   - Duodenum with no significant histologic abnormality     C. Pancreas, uncinate process, biopsy:   - Chronic pancreatitis     D. Liver, dome lesion, needle core biopsy:   - Liver with spindle cell proliferation/lesion, acute and chronic   inflammation and fibrosis   - Focally increased IgG4 positive plasma cells (upto 60/HPF)   - See comment     E. Pancreas, head, biopsy:   - Chronic pancreatitis     F. Pancreas, tail,  biopsy:   - Chronic pancreatitis     G. Pancreas, body, biopsy:   - Chronic pancreatitis with fat necrosis     COMMENT:   Sections of the liver lesion show stromal proliferation composed of   bland spindle cell bundle and whorls with associated fibrosis   infiltrated by neutrophils, lymphocytes, plasma cells and macrophages.   Occasional lymphoid aggregates are noted. The surrounding liver   parenchymal tissue show diffuse moderate portal inflammation composed of   lymphocytes and plasma cells.  There is also a mild to moderate   infiltrate of neutrophils.  Reticulin stain shows preserved reticulin   framework.  Trichrome stains highlight the spindle cell proliferation   and show mild portal fibrosis.  Immunostain are performed with   appropriate controls. The spindle cell proliferation is negative for ALK   and show patchy infiltrate of IgG4 positive plasma cells (upto 60/HPF).   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 Possibility of IgG4-related disease cannot be ruled out.   Clinical and radiologic correlation is recommended.     MRI Abdomen:4/22/2017  IMPRESSION:  1. 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  2. No significant change in small wedge-shaped peripheral areas of  persistent enhancement surrounding mildly dilated biliary radicles,  findings most compatible with cholangitis or sequela of previous  infection.  3. Nonspecific subcutaneous fluid collection in the left upper  quadrant amidst the abdominal wall postsurgical changes.    Reviewed Rheumatology lab flowsheet    MRCP 7/13/2017  IMPRESSION:  1. Findings consistent with hemosiderin deposition within the liver.  2. There is improvement in the heterogeneous enhancement seen on  comparison exam, though patchy areas of increased T2 signal and  persistent enhancement are seen in the liver suggestive of regions of  fibrosis. Mildly dilated bile ducts in the regions of the  liver which  appears somewhat fibrotic, overall similar to prior exam.  3. Postsurgical changes of pancreatectomy, splenectomy and  cholecystectomy.    Lip Biopsy 5/31/2017:  FINAL DIAGNOSIS:   Lip biopsy, lower:   -  Benign minor salivary glands with normal lobular architecture, see   comment     COMMENT:   There is focal mild chronic inflammation.  There is no morphologic   evidence of IgG-4 related disease or Sjogren's disease.       Sincerely,    Jesse Richardson, DO

## 2018-03-23 NOTE — NURSING NOTE
"Chief Complaint   Patient presents with     RECHECK     IgG4       Initial /70 (BP Location: Right arm, Patient Position: Sitting, Cuff Size: Adult Regular)  Pulse 78  Temp 97.5  F (36.4  C) (Oral)  Ht 1.727 m (5' 8\")  Wt 67.2 kg (148 lb 3.2 oz)  SpO2 99%  BMI 22.53 kg/m2 Estimated body mass index is 22.53 kg/(m^2) as calculated from the following:    Height as of this encounter: 1.727 m (5' 8\").    Weight as of this encounter: 67.2 kg (148 lb 3.2 oz).  Medication Reconciliation: complete     Chun Kearns MA    "

## 2018-03-23 NOTE — PATIENT INSTRUCTIONS
-- labs today  -- continue with the cares you are doing. We suspect chest wall pain.   -- see you again in 6 months. Dr. Becerra clinic

## 2018-03-28 ENCOUNTER — OFFICE VISIT (OUTPATIENT)
Dept: ANESTHESIOLOGY | Facility: CLINIC | Age: 53
End: 2018-03-28
Payer: COMMERCIAL

## 2018-03-28 DIAGNOSIS — F43.89 ADJUSTMENT REACTION TO CHRONIC STRESS: Primary | ICD-10-CM

## 2018-03-28 ASSESSMENT — ACTIVITIES OF DAILY LIVING (ADL): I_KEEP_THINKING_ABOUT_HOW_BADLY_I_WANT_THE_PAIN_TO_STOP: 0 = NOT AT ALL

## 2018-03-28 NOTE — PROGRESS NOTES
"[facility]   Comprehensive Pain Program   Psychology Progress Note    Patient Name: Dinora Mcghee    YOB: 1965   Medical Record Number: [id number]  Date: 3/28/2018              SUBJECTIVE              Interval history:   No migraines for 2 weeks on vacation ,  But has had 2 since she has returned to work and is feeling the beginnings of one since last night.  Using breathing to help keep it away but is persisting.   Recent rheumatology visit validated her need to allow body to recover longer and the job is not a good match,  To many hours.    She did talk with boss and reduce the hours to original contract but the pressure is still there and she also has long history of believing she should never surrender.      OBJECTIVE:              Length of Visit: 60 minutes        Mental status: Mild Distress and sad         Session objective:   Continued behavioral pain management skill improvement         Behavioral inventions and response:                 Auditory EMDR/Bilateral sound during session   Along with            CBT   On guilt and shame            Breathing Imagery   Along with                Resourced Brainspotting/Eye position therapy [employer]  is  Migraine pain and tight TMJ                                    Resource spot   Nature place -  Grid  - imagery attachment   .          Beginning  SUDS =  5              Ending S  UDS  =  .5       But processed issues around survivors guilt and [facility]        Also noted jaws better but body \"numb\"                                                              ASSESSMENT              Diagnoses:                                             F43.8      Other stress related disorder                                                               Psychosocial and Contextual Factors: had fluctuating course         Progress toward goals: as expected.              Pain status since onset of pain services: improved           Emotional status " "since onset of pain services: improved       Medication / chemical use concerns: None        PLAN:               Next Appointment: Dinora Mcghee will schedule a follow-up appointment in 2 weeks.         Assignment: Establish a daily routine of stretching and exercise, Healthy breathing skills  for daily use, Utilize  Guided imagery  \"EASE  PAIN\"  at least once a day and auditory EMDR         Objectives / interventions for next session:          Improve pain management skills: Goals include:  Learn constructive cognitive response to pain triggers and Cognitive therapy: create constructive thought processes and beliefs regarding pain               Maria Elena Abdalla, Ph.D., L.P. ...............3/28/2018 9:01 AM                Medical Psychologist                       "

## 2018-03-28 NOTE — MR AVS SNAPSHOT
After Visit Summary   3/28/2018    Dinora Mcghee    MRN: [id number]           Patient Information     Date Of Birth          1965        Visit Information        Provider Department      3/28/2018 9:00 AM Maria Elena Abdalla, PhD [facility]        Today's Diagnoses     Adjustment reaction to chronic stress    -  1       Follow-ups after your visit        Follow-up notes from your care team     Return in about 2 weeks (around 4/11/2018).      Your next 10 appointments already scheduled     Jun 06, 2018  4:20 PM CDT   (Arrive by 4:05 PM)   Return Visit with Vijaya Genao MD   [facility] ([facility])    [address]  [address]th [address]  [facility] [id number] [phone]            Sep 14, 2018 11:30 AM CDT   (Arrive by 11:15 AM)   Return Visit with Anita Becerra MD   [facility] ([facility])    [address]  [address]  [facility] [address] [phone]              Who to contact     Please call your clinic at [phone] to:    Ask questions about your health    Make or cancel appointments    Discuss your medicines    Learn about your test results    Speak to your doctor            Additional Information About Your Visit        [employer] Information     [facility] gives you secure access to your electronic health record. If you see a primary care provider, you can also send messages to your care team and make appointments. If you have questions, please call your primary care clinic.  If you do not have a primary care provider, please call [phone] and they will assist you.      [facility] is an electronic gateway that provides easy, online access to your medical records. With [facility], you can request a clinic appointment, read your test results, renew a prescription or communicate with your care team.     To access your existing  account, please contact your [facility] or call [phone] for assistance.        Care EveryWhere ID     This is your Care EveryWhere ID. This could be used by other organizations to access your [facility] medical records  [id number]         Blood Pressure from Last 3 Encounters:   03/23/18 114/70   02/28/18 111/64   02/21/18 116/70    Weight from Last 3 Encounters:   03/23/18 67.2 kg (148 lb 3.2 oz)   02/28/18 66.8 kg (147 lb 3.2 oz)   02/21/18 67.2 kg (148 lb 1.6 oz)              Today, you had the following     No orders found for display         Today's Medication Changes          These changes are accurate as of 3/28/18 11:02 AM.  If you have any questions, ask your nurse or doctor.               These medicines have changed or have updated prescriptions.        Dose/Directions    levothyroxine 125 MCG tablet   Commonly known as:  SYNTHROID/LEVOTHROID   This may have changed:    - how much to take  - how to take this  - additional instructions   Used for:  Itching, Post-pancreatectomy diabetes (H), History of pancreatectomy, Pancreatic insufficiency        Take 1 tablet (125 mcg), by mouth daily before breakfast.   Quantity:  1 tablet   Refills:  0                Primary Care Provider Office Phone # Fax #    Justyna Lawson [phone]       [facility] [place] [address]  [facility] [address]        Equal Access to Services     [facility] : Hadii monty johnsono Sopooja, waaxda luqadaha, qaybta kaalmada kellie, ivan wallace . So [facility] [phone].    ATENCIÓN: Si habla español, tiene a muñoz disposición servicios gratuitos de asistencia lingüística. Llame al [phone].    We comply with applicable federal civil rights laws and [place] laws. We do not discriminate on the basis of race, color, national origin, age, disability, sex, sexual orientation, or gender identity.            Thank you!     Thank you for choosing [facility]  for your care. Our goal is always to provide you with excellent care. Hearing back from our patients is one way we can continue to improve our services. Please take a few minutes to complete the written survey that you may receive in the mail after your visit with us. Thank you!             Your Updated Medication List - Protect others around you: Learn how to safely use, store and throw away your medicines at www.disposemymeds.org.          This list is accurate as of 3/28/18 11:02 AM.  Always use your most recent med list.                   Brand Name Dispense Instructions for use Diagnosis    acetaminophen 500 MG Caps      Take 2 mg by mouth 2 times daily        * amitriptyline 10 MG tablet    ELAVIL     Take 5 tablets by mouth        * amitriptyline 10 MG tablet    ELAVIL    150 tablet    Take 5 tablets (50 mg) by mouth At Bedtime    Headache disorder       * amitriptyline 50 MG tablet    ELAVIL    60 tablet    Take 1 tablet (50 mg) by mouth At Bedtime    Headache disorder       * amitriptyline 10 MG tablet    ELAVIL    60 tablet    Take 1 tablet (10 mg) by mouth At Bedtime    Headache disorder       amylase-lipase-protease 12454-24109 UNITS Cpep per EC capsule    CREON 24    450 capsule    Take 3-4 capsules by mouth with meals and 1-2 with snacks. Maximum 15 capsules per day.    Acquired total absence of pancreas       aspirin 81 MG EC tablet     90 tablet    Take 1 tablet (81 mg) by mouth daily    High platelet count (H)       azelastine 0.1 % spray    ASTELIN     Spray 1 spray into both nostrils 2 times daily        BD SHARPS  Misc     1 each    1 Container as needed    Post-pancreatectomy diabetes (H)       blood glucose monitoring lancets     1 Box    Use to test blood sugar 6-8 times daily or as directed.    Acute on chronic pancreatitis (H)       blood glucose monitoring test strip    PAT CONTOUR NEXT    2 Box    Use to test blood sugar 6 times  daily or as directed.    Post-pancreatectomy diabetes (H)       celecoxib 100 MG capsule    celeBREX    60 capsule    Take 1 capsule (100 mg) by mouth 2 times daily    Chronic abdominal pain       cetirizine 10 MG tablet    zyrTEC     Take 10 mg by mouth daily    Elevated liver enzymes       CONTOUR NEXT EZ MONITOR W/DEVICE Kit      1 Device 6 times daily    Itching, Post-pancreatectomy diabetes (H), History of pancreatectomy, Pancreatic insufficiency       diphenhydrAMINE 25 MG capsule    BENADRYL ALLERGY    56 capsule    Take 1 capsule (25 mg) by mouth every 6 hours as needed for itching or allergies    Itching, Post-pancreatectomy diabetes (H), History of pancreatectomy, Pancreatic insufficiency       docusate sodium 100 MG capsule    COLACE    60 capsule    Take 1 capsule (100 mg) by mouth 2 times daily as needed for constipation,    Itching, Post-pancreatectomy diabetes (H), History of pancreatectomy, Pancreatic insufficiency       glucagon 1 MG kit    GLUCAGON EMERGENCY    1 mg    Inject 1 mg into the muscle once for 1 dose    Post-pancreatectomy diabetes (H)       glucose 40 % Gel gel     3 Tube    Take 15-30 g by mouth every 15 minutes as needed for low blood sugar    Acquired total absence of pancreas       Injection Device for insulin Tika    NOVOPEN ECHO    1 each    1 each daily as needed    Post-pancreatectomy diabetes (H)       insulin aspart 100 UNIT/ML injection    NovoLOG PENFILL    3 mL    Administer subcutaneously 0.5 unit per 45 grams of carbohydrates.    Post-pancreatectomy diabetes (H)       insulin pen needle 32G X 4 MM    BD KEN U/F    100 each    Use 6-8 times per day    Acquired total absence of pancreas       LANsoprazole 30 MG CR capsule    PREVACID    30 capsule    Take 1 capsule (30 mg) by mouth daily Take 30-60 minutes before a meal.    Post-pancreatectomy diabetes (H), Pancreatic insufficiency       levothyroxine 125 MCG tablet    SYNTHROID/LEVOTHROID    1 tablet    Take 1 tablet  (125 mcg), by mouth daily before breakfast.    Itching, Post-pancreatectomy diabetes (H), History of pancreatectomy, Pancreatic insufficiency       * lubiprostone 24 MCG capsule    AMITIZA     Take 2-3 capsules by mouth        * Lubiprostone 8 MCG Caps capsule    AMITIZA    150 capsule    Take 5 capsules (40 mcg) by mouth daily Take 3 capsules in AM and 2 capsules in PM    Other constipation       multivitamin CF formula capsule    CHOICEFUL     Take 1 tablet by mouth daily    Itching, Post-pancreatectomy diabetes (H), History of pancreatectomy, Pancreatic insufficiency       order for DME     1 Device    Equipment being ordered:Cefaly    Headache disorder       prochlorperazine 5 MG tablet    COMPAZINE    90 tablet    Take two 5 mg tablets by mouth every six hours as needed for nausea.    Itching, Post-pancreatectomy diabetes (H), History of pancreatectomy, Pancreatic insufficiency       senna-docusate 8.6-50 MG per tablet    SENOKOT-S;PERICOLACE    100 tablet    Take 1 tablet by mouth 2 times daily as needed for constipation    Itching, Post-pancreatectomy diabetes (H), History of pancreatectomy, Pancreatic insufficiency       VAGIFEM 10 MCG Tabs vaginal tablet   Generic drug:  estradiol           ZOMIG 5 MG tablet   Generic drug:  ZOLMitriptan      Take 5 mg by mouth at onset of headache for migraine        * Notice:  This list has 6 medication(s) that are the same as other medications prescribed for you. Read the directions carefully, and ask your doctor or other care provider to review them with you.

## 2018-04-09 ENCOUNTER — TELEPHONE (OUTPATIENT)
Dept: GASTROENTEROLOGY | Facility: CLINIC | Age: 53
End: 2018-04-09

## 2018-04-09 NOTE — TELEPHONE ENCOUNTER
Medication: Amitiza 8mcg Caps      Other Information:        [facility]  Phone: [phone]  Fax: [phone]

## 2018-04-10 NOTE — TELEPHONE ENCOUNTER
Central Prior Authorization Team   Phone: [phone]      PA Initiation    Medication: Amitiza 8mcg Caps  Insurance Company: [employer] - Phone [phone] Fax [phone]  Pharmacy Filling the Rx: [facility] - [place], [place] - [address]  Filling Pharmacy Phone: [phone]  Filling Pharmacy Fax:   Start Date: 4/10/2018

## 2018-04-11 NOTE — TELEPHONE ENCOUNTER
Central Prior Authorization Team   Phone: [phone]    Patient called following up on PA. She would like this PA expedited due to she is almost out of medication.  Patient would also like a call back with update, thanks.

## 2018-04-11 NOTE — TELEPHONE ENCOUNTER
Called [facility] [phone], asked if the P/A process could be expedited as patient is almost out of medication. They have moved the P/A request up in their queue.

## 2018-04-12 NOTE — TELEPHONE ENCOUNTER
Prior Authorization Approval    Authorization Effective Date: 4/9/2018  Authorization Expiration Date: 4/9/2019  Medication: Amitiza 8mcg Caps - P/A APPROVED  Approved Dose/Quantity: 150  Reference #: [id number] [id number]   Insurance Company: [employer] - Phone [phone] Fax [phone]  Expected CoPay: No Copay     CoPay Card Available:      Foundation Assistance Needed:    Which Pharmacy is filling the prescription (Not needed for infusion/clinic administered): [facility] - [place], [place] - [facility] [address]   Pharmacy Notified: Yes  Patient Notified: No - Mail Order pharmacy will contact patient and send out asap

## 2018-05-02 ENCOUNTER — OFFICE VISIT (OUTPATIENT)
Dept: PSYCHOLOGY | Facility: CLINIC | Age: 53
End: 2018-05-02
Payer: COMMERCIAL

## 2018-05-02 DIAGNOSIS — F43.89 STRESS-RELATED PHYSIOLOGICAL RESPONSE AFFECTING PHYSICAL CONDITION: Primary | ICD-10-CM

## 2018-05-02 NOTE — PROGRESS NOTES
[facility]   Comprehensive Pain Program   Psychology Progress Note    Patient Name: Dinora Mcghee    YOB: 1965   Medical Record Number: [id number]  Date: 5/2/2018              SUBJECTIVE            Interval history:  Migraine reduced by half.   Hay  Fever  Struggles and that is causing some HA  Today.  Has been more assertive with boundaries and has worked out better work environment and having effective compromises.  Setting limits with parents and overall feeling better and not guilt.      Having early migraine now  OBJECTIVE:              Length of Visit: 55 minutes        Mental status: Mild Distress         Session objective:   Continued behavioral pain management skill improvment         Behavioral inventions and response:                        CBT  On perfectionism             Breathing Imagery   Along with                Resourced Brainspotting/Eye position therapy [employer]  is  Pain mingraine                                   Resource spot:  Beach  And color   .          Beginning  SUDS =  6              Ending SUDS  =  1     Processing some shame  And jaw pain   And then gave herself permission for fire breaths                                                                 ASSESSMENT              Diagnoses:                                                F43.8      Other stress related disorder                                                               Psychosocial and Contextual Factors: stayed the same         Progress toward goals: satisfactory.              Pain status since onset of pain services: improved           Emotional status since onset of pain services: improved       Medication / chemical use concerns: None        PLAN:               Next Appointment: Dinora Mcghee will schedule a follow-up appointment in 1 weeks.         Assignment: Establish a daily routine of stretching and exercise and Healthy breathing skills  for daily use         Objectives / interventions  for next session:          Improve pain management skills: Goals include:  Learn to pace and moderate physical activity and Learn constructive cognitive response to pain triggers               Maria Elena Abdalla, Ph.D., L.P. ...............5/2/2018 9:02 AM                Medical Psychologist

## 2018-05-02 NOTE — MR AVS SNAPSHOT
After Visit Summary   5/2/2018    Dinora Mcghee    MRN: [id number]           Patient Information     Date Of Birth          1965        Visit Information        Provider Department      5/2/2018 9:00 AM Maria Elena Abdalla, PhD [facility]        Today's Diagnoses     Stress-related physiological response affecting physical condition    -  1       Follow-ups after your visit        Follow-up notes from your care team     Return in about 2 weeks (around 5/16/2018).      Your next 10 appointments already scheduled     Jun 06, 2018  4:20 PM CDT   (Arrive by 4:05 PM)   Return Visit with Vijaya Genao MD   [facility] ([facility])    [address]  4th Floor  [facility] [id number] [phone]            Sep 14, 2018 11:30 AM CDT   (Arrive by 11:15 AM)   Return Visit with Anita Becerra MD   [facility] ([facility])    [address]  [address]  [facility] [address] [phone]              Who to contact     Please call your clinic at [phone] to:    Ask questions about your health    Make or cancel appointments    Discuss your medicines    Learn about your test results    Speak to your doctor            Additional Information About Your Visit        [employer] Information     irisnote gives you secure access to your electronic health record. If you see a primary care provider, you can also send messages to your care team and make appointments. If you have questions, please call your primary care clinic.  If you do not have a primary care provider, please call [phone] and they will assist you.      irisnote is an electronic gateway that provides easy, online access to your medical records. With irisnote, you can request a clinic appointment, read your test results, renew a prescription or communicate with your care team.     To access your  existing account, please contact your [facility] or call [phone] for assistance.        Care EveryWhere ID     This is your Care EveryWhere ID. This could be used by other organizations to access your [facility] medical records  [id number]         Blood Pressure from Last 3 Encounters:   03/23/18 114/70   02/28/18 111/64   02/21/18 116/70    Weight from Last 3 Encounters:   03/23/18 67.2 kg (148 lb 3.2 oz)   02/28/18 66.8 kg (147 lb 3.2 oz)   02/21/18 67.2 kg (148 lb 1.6 oz)              Today, you had the following     No orders found for display         Today's Medication Changes          These changes are accurate as of 5/2/18 10:00 AM.  If you have any questions, ask your nurse or doctor.               These medicines have changed or have updated prescriptions.        Dose/Directions    levothyroxine 125 MCG tablet   Commonly known as:  SYNTHROID/LEVOTHROID   This may have changed:    - how much to take  - how to take this  - additional instructions   Used for:  Itching, Post-pancreatectomy diabetes (H), History of pancreatectomy, Pancreatic insufficiency        Take 1 tablet (125 mcg), by mouth daily before breakfast.   Quantity:  1 tablet   Refills:  0                Primary Care Provider Office Phone # Fax #    Justyna Lawson [phone]       [facility] [place] [address]  [place] [address]        Equal Access to Services     [facility] Center[facility] : Hadii monty johnsono Sopooja, waaxda luqadaha, qaybta kaalmada kellie, ivan wallace . So [facility] [phone].    ATENCIÓN: Si habla español, tiene a muñoz disposición servicios gratuitos de asistencia lingüística. Llame al [phone].    We comply with applicable federal civil rights laws and [place] laws. We do not discriminate on the basis of race, color, national origin, age, disability, sex, sexual orientation, or gender identity.            Thank you!     Thank you for  [facility]  for your care. Our goal is always to provide you with excellent care. Hearing back from our patients is one way we can continue to improve our services. Please take a few minutes to complete the written survey that you may receive in the mail after your visit with us. Thank you!             Your Updated Medication List - Protect others around you: Learn how to safely use, store and throw away your medicines at www.disposemymeds.org.          This list is accurate as of 5/2/18 10:00 AM.  Always use your most recent med list.                   Brand Name Dispense Instructions for use Diagnosis    acetaminophen 500 MG Caps      Take 2 mg by mouth 2 times daily        * amitriptyline 10 MG tablet    ELAVIL     Take 5 tablets by mouth        * amitriptyline 10 MG tablet    ELAVIL    150 tablet    Take 5 tablets (50 mg) by mouth At Bedtime    Headache disorder       * amitriptyline 50 MG tablet    ELAVIL    60 tablet    Take 1 tablet (50 mg) by mouth At Bedtime    Headache disorder       * amitriptyline 10 MG tablet    ELAVIL    60 tablet    Take 1 tablet (10 mg) by mouth At Bedtime    Headache disorder       amylase-lipase-protease 64589-26822 units Cpep per EC capsule    CREON 24    450 capsule    Take 3-4 capsules by mouth with meals and 1-2 with snacks. Maximum 15 capsules per day.    Acquired total absence of pancreas       aspirin 81 MG EC tablet     90 tablet    Take 1 tablet (81 mg) by mouth daily    High platelet count (H)       azelastine 0.1 % spray    ASTELIN     Spray 1 spray into both nostrils 2 times daily        BD SHARPS  Misc     1 each    1 Container as needed    Post-pancreatectomy diabetes (H)       blood glucose monitoring lancets     1 Box    Use to test blood sugar 6-8 times daily or as directed.    Acute on chronic pancreatitis (H)       blood glucose monitoring test strip    PAT CONTOUR NEXT    2 Box    Use to test blood sugar 6 times daily or as  directed.    Post-pancreatectomy diabetes (H)       celecoxib 100 MG capsule    celeBREX    60 capsule    Take 1 capsule (100 mg) by mouth 2 times daily    Chronic abdominal pain       cetirizine 10 MG tablet    zyrTEC     Take 10 mg by mouth daily    Elevated liver enzymes       CONTOUR NEXT EZ MONITOR w/Device Kit      1 Device 6 times daily    Itching, Post-pancreatectomy diabetes (H), History of pancreatectomy, Pancreatic insufficiency       diphenhydrAMINE 25 MG capsule    BENADRYL ALLERGY    56 capsule    Take 1 capsule (25 mg) by mouth every 6 hours as needed for itching or allergies    Itching, Post-pancreatectomy diabetes (H), History of pancreatectomy, Pancreatic insufficiency       docusate sodium 100 MG capsule    COLACE    60 capsule    Take 1 capsule (100 mg) by mouth 2 times daily as needed for constipation,    Itching, Post-pancreatectomy diabetes (H), History of pancreatectomy, Pancreatic insufficiency       glucagon 1 MG kit    GLUCAGON EMERGENCY    1 mg    Inject 1 mg into the muscle once for 1 dose    Post-pancreatectomy diabetes (H)       glucose 40 % Gel gel     3 Tube    Take 15-30 g by mouth every 15 minutes as needed for low blood sugar    Acquired total absence of pancreas       Injection Device for insulin Tika    NOVOPEN ECHO    1 each    1 each daily as needed    Post-pancreatectomy diabetes (H)       insulin aspart 100 UNIT/ML injection    NovoLOG PENFILL    3 mL    Administer subcutaneously 0.5 unit per 45 grams of carbohydrates.    Post-pancreatectomy diabetes (H)       insulin pen needle 32G X 4 MM    BD KEN U/F    100 each    Use 6-8 times per day    Acquired total absence of pancreas       LANsoprazole 30 MG CR capsule    PREVACID    30 capsule    Take 1 capsule (30 mg) by mouth daily Take 30-60 minutes before a meal.    Post-pancreatectomy diabetes (H), Pancreatic insufficiency       levothyroxine 125 MCG tablet    SYNTHROID/LEVOTHROID    1 tablet    Take 1 tablet (125 mcg), by  mouth daily before breakfast.    Itching, Post-pancreatectomy diabetes (H), History of pancreatectomy, Pancreatic insufficiency       * lubiprostone 24 MCG capsule    AMITIZA     Take 2-3 capsules by mouth        * Lubiprostone 8 MCG Caps capsule    AMITIZA    150 capsule    Take 5 capsules (40 mcg) by mouth daily Take 3 capsules in AM and 2 capsules in PM    Other constipation       multivitamin CF formula capsule    CHOICEFUL     Take 1 tablet by mouth daily    Itching, Post-pancreatectomy diabetes (H), History of pancreatectomy, Pancreatic insufficiency       order for DME     1 Device    Equipment being ordered:Cefaly    Headache disorder       prochlorperazine 5 MG tablet    COMPAZINE    90 tablet    Take two 5 mg tablets by mouth every six hours as needed for nausea.    Itching, Post-pancreatectomy diabetes (H), History of pancreatectomy, Pancreatic insufficiency       senna-docusate 8.6-50 MG per tablet    SENOKOT-S;PERICOLACE    100 tablet    Take 1 tablet by mouth 2 times daily as needed for constipation    Itching, Post-pancreatectomy diabetes (H), History of pancreatectomy, Pancreatic insufficiency       VAGIFEM 10 MCG Tabs vaginal tablet   Generic drug:  estradiol           ZOMIG 5 MG tablet   Generic drug:  ZOLMitriptan      Take 5 mg by mouth at onset of headache for migraine        * Notice:  This list has 6 medication(s) that are the same as other medications prescribed for you. Read the directions carefully, and ask your doctor or other care provider to review them with you.

## 2018-05-11 ENCOUNTER — TELEPHONE (OUTPATIENT)
Dept: TRANSPLANT | Facility: CLINIC | Age: 53
End: 2018-05-11

## 2018-05-11 NOTE — TELEPHONE ENCOUNTER
"Spoke to Dinora today. She has a possible umbilical hernia. Has a bulge \" bigger than a marble, smaller than a golf ball \" protruding on the lower end of her surgical scar.It is mildly painful, she can push it back and it falls back when she lies down. Scheduled to see dr Phoenix for assessment 5/15/2018. She is aware to seek emergency attention if it becomes painful and /or does not retract.  "

## 2018-05-15 ENCOUNTER — OFFICE VISIT (OUTPATIENT)
Dept: TRANSPLANT | Facility: CLINIC | Age: 53
End: 2018-05-15
Attending: SURGERY
Payer: COMMERCIAL

## 2018-05-15 VITALS
HEIGHT: 68 IN | BODY MASS INDEX: 22.88 KG/M2 | RESPIRATION RATE: 20 BRPM | OXYGEN SATURATION: 99 % | SYSTOLIC BLOOD PRESSURE: 114 MMHG | TEMPERATURE: 98.2 F | WEIGHT: 151 LBS | DIASTOLIC BLOOD PRESSURE: 76 MMHG | HEART RATE: 94 BPM

## 2018-05-15 DIAGNOSIS — K86.89 PANCREATIC INSUFFICIENCY: ICD-10-CM

## 2018-05-15 DIAGNOSIS — Z90.410 POST-PANCREATECTOMY DIABETES (H): ICD-10-CM

## 2018-05-15 DIAGNOSIS — K43.2 INCISIONAL HERNIA, WITHOUT OBSTRUCTION OR GANGRENE: Primary | ICD-10-CM

## 2018-05-15 DIAGNOSIS — Z90.410 ACQUIRED TOTAL ABSENCE OF PANCREAS: ICD-10-CM

## 2018-05-15 DIAGNOSIS — E89.1 POST-PANCREATECTOMY DIABETES (H): ICD-10-CM

## 2018-05-15 DIAGNOSIS — E13.9 POST-PANCREATECTOMY DIABETES (H): ICD-10-CM

## 2018-05-15 PROCEDURE — G0463 HOSPITAL OUTPT CLINIC VISIT: HCPCS | Mod: ZF

## 2018-05-15 RX ORDER — LEVOTHYROXINE SODIUM 137 UG/1
137 TABLET ORAL DAILY
COMMUNITY
End: 2019-02-19

## 2018-05-15 ASSESSMENT — PATIENT HEALTH QUESTIONNAIRE - PHQ9
10. IF YOU CHECKED OFF ANY PROBLEMS, HOW DIFFICULT HAVE THESE PROBLEMS MADE IT FOR YOU TO DO YOUR WORK, TAKE CARE OF THINGS AT HOME, OR GET ALONG WITH OTHER PEOPLE: SOMEWHAT DIFFICULT
SUM OF ALL RESPONSES TO PHQ QUESTIONS 1-9: 4
SUM OF ALL RESPONSES TO PHQ QUESTIONS 1-9: 4

## 2018-05-15 ASSESSMENT — PAIN SCALES - GENERAL: PAINLEVEL: NO PAIN (0)

## 2018-05-15 NOTE — LETTER
"5/15/2018      RE: Dinora Mcghee  [address] [facility] [address]       Chronic Pancreatitis Group Progress Note    I had the pleasure of seeing Ms. Dinora Mcghee today. 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    Interval events since last visit with me:   ER visits = 0, reasons: na  Inpatient admissions = 0, reasons na  The patient reports their current symptoms to be: small \"squishy\" area noted on patients abdomen.  She states she noticed it when she had to go to the bathroom.  The area became hard and uncomfortable to her.  She states it bulges out when she sneezes.  She is able to push the area back in  when she lies flat   GI function:   Nausea:   [x]  none    []  rare    []  often    []  daily  Comment:   Vomiting: [x]  none    []  rare    []  often    []  daily   Comment:   Last BM: 1-2 days.  Stools are soft, frequency : 1.   Appetite: good  Tube feeds: NA  Oral food intake: 4-6 per day  Pancreatic exocrine insufficiency:   Takes (Creon 24), 12-15 per day  Greasy stools: [x]  none   []  rarely    []  several times/wk   []  daily      Comment:   Diabetes management:   Long acting insulin: none units/day  Short acting insulin: none units/day  Blood sugars typically range from 110 average   Lab Results   Component Value Date    A1C 5.9 01/16/2018    A1C 5.2 06/20/2017    A1C 5.6 04/04/2017      Pain management:   Pain rating (0=no pain, 10=unbearable): 0;  On no pain medications    Daily 'Uptime': all day  Walking: distance 3 miles, 7 times per week  Prescription Medications as of 5/15/2018             acetaminophen 500 MG CAPS Take 2 mg by mouth 2 times daily    amitriptyline (ELAVIL) 10 MG tablet Take 1 tablet (10 mg) by mouth At Bedtime    amitriptyline (ELAVIL) 50 MG tablet Take 1 tablet (50 mg) by mouth At Bedtime    amylase-lipase-protease (CREON 24) 53899-21882 UNITS CPEP per EC capsule Take 3-4 capsules by mouth with meals and 1-2 with snacks. Maximum 15 " capsules per day.    aspirin 81 MG EC tablet Take 1 tablet (81 mg) by mouth daily    azelastine (ASTELIN) 0.1 % spray Spray 1 spray into both nostrils 2 times daily    blood glucose monitoring (PAT CONTOUR NEXT) test strip Use to test blood sugar 6 times daily or as directed.    blood glucose monitoring (PAT MICROLET) lancets Use to test blood sugar 6-8 times daily or as directed.    cetirizine (ZYRTEC) 10 MG tablet Take 10 mg by mouth daily    diphenhydrAMINE (BENADRYL ALLERGY) 25 MG capsule Take 1 capsule (25 mg) by mouth every 6 hours as needed for itching or allergies    docusate sodium (COLACE) 100 MG capsule Take 1 capsule (100 mg) by mouth 2 times daily as needed for constipation,    estradiol (VAGIFEM) 10 MCG TABS vaginal tablet     FOLIC ACID PO Take 1 mg by mouth daily    glucose 40 % GEL gel Take 15-30 g by mouth every 15 minutes as needed for low blood sugar    insulin aspart (NOVOLOG PENFILL) 100 UNIT/ML injection Administer subcutaneously 0.5 unit per 45 grams of carbohydrates.    insulin pen needle (BD KEN U/F) 32G X 4 MM Use 6-8 times per day    LANsoprazole (PREVACID) 30 MG CR capsule Take 1 capsule (30 mg) by mouth daily Take 30-60 minutes before a meal.    levothyroxine (SYNTHROID) 137 MCG tablet Take 137 mcg by mouth daily    Lubiprostone (AMITIZA) 8 MCG CAPS capsule Take 5 capsules (40 mcg) by mouth daily Take 3 capsules in AM and 2 capsules in PM    multivitamin CF formula (CHOICEFUL) capsule Take 1 tablet by mouth daily    order for DME Equipment being ordered:Cefaly    prochlorperazine (COMPAZINE) 5 MG tablet Take two 5 mg tablets by mouth every six hours as needed for nausea.    Sharps Container (BD SHARPS ) MISC 1 Container as needed    ZOLMitriptan (ZOMIG) 5 MG tablet Take 5 mg by mouth at onset of headache for migraine    celecoxib (CELEBREX) 100 MG capsule Take 1 capsule (100 mg) by mouth 2 times daily        Physical exam:   General Appearance: in no apparent distress.    Temp:  [98.2  F (36.8  C)] 98.2  F (36.8  C)  Pulse:  [94] 94  Resp:  [20] 20  BP: (114)/(76) 114/76  SpO2:  [99 %] 99 %   Skin: Normal, no rashes or jaundice  Heart: Regular rhythm.  Lungs: no audible wheezes or increased work of breathing.  Abdomen: The abdomen is flat, and the wound is healed, with small (1 cm) incisional reducible hernia at the lower extent of her upper midline incision.The hernia contains only omentum.The abdomen is mildly tender around the umbilicus to palpation.    Edema: absent.    Chronic Pancreatitis Latest Ref Rng & Units 1/16/2018 2/21/2018 2/28/2018 3/23/2018 5/15/2018   Weight - 67.631 kg 67.178 kg 66.769 kg 67.223 kg 68.493 kg   AMYLASE 30 - 110 U/L - - - - -   LIPASE 73 - 393 U/L - - - - -   HEMOGLOBIN A1C 4.3 - 6.0 % - - - - -   C PEPTIDE 0.9 - 6.9 ng/mL - - - - -   GLUCOSE 70 - 99 mg/dL - - - 101(H) -   HGB 11.7 - 15.7 g/dL - - - 13.9 -    - 450 10e9/L - - - 382 -   ALBUMIN 3.4 - 5.0 g/dL - - - 3.9 -   PREALBUMIN 15 - 45 mg/dL - - - - -       Assessment and Plan: She is doing well s/p TP-IAT.  Interval progress has been good.  Postoperative gastric ileus: having regular bowel movements  Pancreatic exocrine insufficiency: continue on creon 24, as she seems to have good control.  Malnutrition: none  Diabetes mellitus:  On no insulin  Abdominal pain management: not on opioids.   Small incisional hernia can be repaired electively.  Patient wishes to avoid mesh due to allergies.  We discussed indications for mesh, durability of repair with and without.  She would like to defer repair until after some family events this summer.  Educated regarding signs/symptoms of incarceration.  Followup: I will see her again in clinic in prn      Total time: 25 min, Counselling Time: 20 min.          Nestor Phoenix MD  Department of Surgery    Nestor Phoenix MD

## 2018-05-15 NOTE — PROGRESS NOTES
"[facility] Progress Note    I had the pleasure of seeing Ms. Dinora Mcghee today. 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    Interval events since last visit with me:   ER visits = 0, reasons: na  Inpatient admissions = 0, reasons na  The patient reports their current symptoms to be: small \"squishy\" area noted on patients abdomen.  She states she noticed it when she had to go to the bathroom.  The area became hard and uncomfortable to her.  She states it bulges out when she sneezes.  She is able to push the area back in  when she lies flat   GI function:   Nausea:   [x]  none    []  rare    []  often    []  daily  Comment:   Vomiting: [x]  none    []  rare    []  often    []  daily   Comment:   Last BM: 1-2 days.  Stools are soft, frequency : 1.   Appetite: good  Tube feeds: NA  Oral food intake: 4-6 per day  Pancreatic exocrine insufficiency:   Takes (Creon 24), 12-15 per day  Greasy stools: [x]  none   []  rarely    []  several times/wk   []  daily      Comment:   Diabetes management:   Long acting insulin: none units/day  Short acting insulin: none units/day  Blood sugars typically range from 110 average   Lab Results   Component Value Date    A1C 5.9 01/16/2018    A1C 5.2 06/20/2017    A1C 5.6 04/04/2017      Pain management:   Pain rating (0=no pain, 10=unbearable): 0;  On no pain medications    Daily 'Uptime': all day  Walking: distance 3 miles, 7 times per week  Prescription Medications as of 5/15/2018             acetaminophen 500 MG CAPS Take 2 mg by mouth 2 times daily    amitriptyline (ELAVIL) 10 MG tablet Take 1 tablet (10 mg) by mouth At Bedtime    amitriptyline (ELAVIL) 50 MG tablet Take 1 tablet (50 mg) by mouth At Bedtime    amylase-lipase-protease (CREON 24) 57289-66618 UNITS CPEP per EC capsule Take 3-4 capsules by mouth with meals and 1-2 with snacks. Maximum 15 capsules per day.    aspirin 81 MG EC tablet Take 1 tablet (81 mg) by mouth daily    " azelastine (ASTELIN) 0.1 % spray Spray 1 spray into both nostrils 2 times daily    blood glucose monitoring (PAT CONTOUR NEXT) test strip Use to test blood sugar 6 times daily or as directed.    blood glucose monitoring (PAT MICROLET) lancets Use to test blood sugar 6-8 times daily or as directed.    cetirizine (ZYRTEC) 10 MG tablet Take 10 mg by mouth daily    diphenhydrAMINE (BENADRYL ALLERGY) 25 MG capsule Take 1 capsule (25 mg) by mouth every 6 hours as needed for itching or allergies    docusate sodium (COLACE) 100 MG capsule Take 1 capsule (100 mg) by mouth 2 times daily as needed for constipation,    estradiol (VAGIFEM) 10 MCG TABS vaginal tablet     FOLIC ACID PO Take 1 mg by mouth daily    glucose 40 % GEL gel Take 15-30 g by mouth every 15 minutes as needed for low blood sugar    insulin aspart (NOVOLOG PENFILL) 100 UNIT/ML injection Administer subcutaneously 0.5 unit per 45 grams of carbohydrates.    insulin pen needle (BD KEN U/F) 32G X 4 MM Use 6-8 times per day    LANsoprazole (PREVACID) 30 MG CR capsule Take 1 capsule (30 mg) by mouth daily Take 30-60 minutes before a meal.    levothyroxine (SYNTHROID) 137 MCG tablet Take 137 mcg by mouth daily    Lubiprostone (AMITIZA) 8 MCG CAPS capsule Take 5 capsules (40 mcg) by mouth daily Take 3 capsules in AM and 2 capsules in PM    multivitamin CF formula (CHOICEFUL) capsule Take 1 tablet by mouth daily    order for DME Equipment being ordered:Cefaly    prochlorperazine (COMPAZINE) 5 MG tablet Take two 5 mg tablets by mouth every six hours as needed for nausea.    Sharps Container (BD SHARPS ) MISC 1 Container as needed    ZOLMitriptan (ZOMIG) 5 MG tablet Take 5 mg by mouth at onset of headache for migraine    celecoxib (CELEBREX) 100 MG capsule Take 1 capsule (100 mg) by mouth 2 times daily        Physical exam:   General Appearance: in no apparent distress.   Temp:  [98.2  F (36.8  C)] 98.2  F (36.8  C)  Pulse:  [94] 94  Resp:  [20] 20  BP:  (114)/(76) 114/76  SpO2:  [99 %] 99 %   Skin: Normal, no rashes or jaundice  Heart: Regular rhythm.  Lungs: no audible wheezes or increased work of breathing.  Abdomen: The abdomen is flat, and the wound is healed, with small (1 cm) incisional reducible hernia at the lower extent of her upper midline incision.The hernia contains only omentum.The abdomen is mildly tender around the umbilicus to palpation.    Edema: absent.    Chronic Pancreatitis Latest Ref Rng & Units 1/16/2018 2/21/2018 2/28/2018 3/23/2018 5/15/2018   Weight - 67.631 kg 67.178 kg 66.769 kg 67.223 kg 68.493 kg   AMYLASE 30 - 110 U/L - - - - -   LIPASE 73 - 393 U/L - - - - -   HEMOGLOBIN A1C 4.3 - 6.0 % - - - - -   C PEPTIDE 0.9 - 6.9 ng/mL - - - - -   GLUCOSE 70 - 99 mg/dL - - - 101(H) -   HGB 11.7 - 15.7 g/dL - - - 13.9 -    - 450 10e9/L - - - 382 -   ALBUMIN 3.4 - 5.0 g/dL - - - 3.9 -   PREALBUMIN 15 - 45 mg/dL - - - - -       Assessment and Plan: She is doing well s/p TP-IAT.  Interval progress has been good.  Postoperative gastric ileus: having regular bowel movements  Pancreatic exocrine insufficiency: continue on creon 24, as she seems to have good control.  Malnutrition: none  Diabetes mellitus:  On no insulin  Abdominal pain management: not on opioids.   Small incisional hernia can be repaired electively.  Patient wishes to avoid mesh due to allergies.  We discussed indications for mesh, durability of repair with and without.  She would like to defer repair until after some family events this summer.  Educated regarding signs/symptoms of incarceration.  Followup: I will see her again in clinic in prn      Total time: 25 min, Counselling Time: 20 min.          Nestor Phoenix MD  Department of Surgery

## 2018-05-15 NOTE — MR AVS SNAPSHOT
After Visit Summary   5/15/2018    Dinora Mcghee    MRN: [id number]           Patient Information     Date Of Birth          1965        Visit Information        Provider Department      5/15/2018 1:40 PM Nestor Phoenix MD [facility]        Today's Diagnoses     Incisional hernia, without obstruction or gangrene    -  1    Acquired total absence of pancreas        Post-pancreatectomy diabetes (H)        Pancreatic insufficiency           Follow-ups after your visit        Your next 10 appointments already scheduled     Jun 06, 2018  4:20 PM CDT   (Arrive by 4:05 PM)   Return Visit with Vijaya Genao MD   [facility] ([facility])    [address] [place] [address]  4th Floor  [facility] [address] [phone]            Sep 14, 2018 11:30 AM CDT   (Arrive by 11:15 AM)   Return Visit with Anita Becerra MD   [facility] ([facility])    [address] [place] [address]  [address]  [facility] [address] [phone]              Who to contact     If you have questions or need follow up information about today's clinic visit or your schedule please contact [facility] directly at [phone].  Normal or non-critical lab and imaging results will be communicated to you by [employer], letter or phone within 4 business days after the clinic has received the results. If you do not hear from us within 7 days, please contact the clinic through [employer] or phone. If you have a critical or abnormal lab result, we will notify you by phone as soon as possible.  Submit refill requests through [employer] or call your pharmacy and they will forward the refill request to us. Please allow 3 business days for your refill to be completed.          Additional Information About Your Visit        [employer] Information     [employer] gives you secure access to your electronic health record.  "If you see a primary care provider, you can also send messages to your care team and make appointments. If you have questions, please call your primary care clinic.  If you do not have a primary care provider, please call [phone] and they will assist you.        Care EveryWhere ID     This is your Care EveryWhere ID. This could be used by other organizations to access your [facility] medical records  [id number]        Your Vitals Were     Pulse Temperature Respirations Height Pulse Oximetry BMI (Body Mass Index)    94 98.2  F (36.8  C) (Oral) 20 1.727 m (5' 8\") 99% 22.96 kg/m2       Blood Pressure from Last 3 Encounters:   05/15/18 114/76   03/23/18 114/70   02/28/18 111/64    Weight from Last 3 Encounters:   05/15/18 68.5 kg (151 lb)   03/23/18 67.2 kg (148 lb 3.2 oz)   02/28/18 66.8 kg (147 lb 3.2 oz)              Today, you had the following     No orders found for display         Today's Medication Changes          These changes are accurate as of 5/15/18 11:59 PM.  If you have any questions, ask your nurse or doctor.               These medicines have changed or have updated prescriptions.        Dose/Directions    * amitriptyline 50 MG tablet   Commonly known as:  ELAVIL   This may have changed:  Another medication with the same name was removed. Continue taking this medication, and follow the directions you see here.   Used for:  Headache disorder   Changed by:  Nestor Phoenix MD        Dose:  50 mg   Take 1 tablet (50 mg) by mouth At Bedtime   Quantity:  60 tablet   Refills:  3       * amitriptyline 10 MG tablet   Commonly known as:  ELAVIL   This may have changed:  Another medication with the same name was removed. Continue taking this medication, and follow the directions you see here.   Used for:  Headache disorder   Changed by:  Nestor Phoenix MD        Dose:  10 mg   Take 1 tablet (10 mg) by mouth At Bedtime   Quantity:  60 tablet   Refills:  3       Lubiprostone 8 MCG Caps capsule   Commonly " known as:  AMITIZA   This may have changed:  Another medication with the same name was removed. Continue taking this medication, and follow the directions you see here.   Used for:  Other constipation   Changed by:  Nestor Phoenix MD        Dose:  5 capsule   Take 5 capsules (40 mcg) by mouth daily Take 3 capsules in AM and 2 capsules in PM   Quantity:  150 capsule   Refills:  3       SYNTHROID 137 MCG tablet   This may have changed:  Another medication with the same name was removed. Continue taking this medication, and follow the directions you see here.   Generic drug:  levothyroxine   Changed by:  Nestor Phoenix MD        Dose:  137 mcg   Take 137 mcg by mouth daily   Refills:  0       * Notice:  This list has 2 medication(s) that are the same as other medications prescribed for you. Read the directions carefully, and ask your doctor or other care provider to review them with you.      Stop taking these medicines if you haven't already. Please contact your care team if you have questions.     CONTOUR NEXT EZ MONITOR w/Device Kit   Stopped by:  Nestor Phoenix MD           glucagon 1 MG kit   Commonly known as:  GLUCAGON EMERGENCY   Stopped by:  Nestor Phoenix MD           Injection Device for insulin Tika   Commonly known as:  NOVOPEN ECHO   Stopped by:  Nestor Phoenix MD           senna-docusate 8.6-50 MG per tablet   Commonly known as:  SENOKOT-S;PERICOLACE   Stopped by:  Nestor Phoenix MD                    Primary Care Provider Office Phone # Fax #    Justyna Lawson [phone]       [facility] [address] [address]  [place] [facility] [address]        Equal Access to Services     [facility]: Hadii monty sotelo hadasho Soomaali, waaxda luqadaha, qaybta kaalmada adeegyada, ivan wallace . So [facility] [phone].    ATENCIÓN: Si habla español, tiene a muñoz disposición servicios gratuitos de asistencia lingüística. Llame al [phone].    We comply with applicable  federal civil rights laws and [place] laws. We do not discriminate on the basis of race, color, national origin, age, disability, sex, sexual orientation, or gender identity.            Thank you!     Thank you for choosing [facility]  for your care. Our goal is always to provide you with excellent care. Hearing back from our patients is one way we can continue to improve our services. Please take a few minutes to complete the written survey that you may receive in the mail after your visit with us. Thank you!             Your Updated Medication List - Protect others around you: Learn how to safely use, store and throw away your medicines at www.disposemymeds.org.          This list is accurate as of 5/15/18 11:59 PM.  Always use your most recent med list.                   Brand Name Dispense Instructions for use Diagnosis    acetaminophen 500 MG Caps      Take 2 mg by mouth 2 times daily        * amitriptyline 50 MG tablet    ELAVIL    60 tablet    Take 1 tablet (50 mg) by mouth At Bedtime    Headache disorder       * amitriptyline 10 MG tablet    ELAVIL    60 tablet    Take 1 tablet (10 mg) by mouth At Bedtime    Headache disorder       amylase-lipase-protease 67991-61884 units Cpep per EC capsule    CREON 24    450 capsule    Take 3-4 capsules by mouth with meals and 1-2 with snacks. Maximum 15 capsules per day.    Acquired total absence of pancreas       aspirin 81 MG EC tablet     90 tablet    Take 1 tablet (81 mg) by mouth daily    High platelet count (H)       azelastine 0.1 % spray    ASTELIN     Spray 1 spray into both nostrils 2 times daily        BD SHARPS  Misc     1 each    1 Container as needed    Post-pancreatectomy diabetes (H)       blood glucose monitoring lancets     1 Box    Use to test blood sugar 6-8 times daily or as directed.    Acute on chronic pancreatitis (H)       blood glucose monitoring test strip    PAT CONTOUR NEXT    2 Box    Use to test blood sugar  6 times daily or as directed.    Post-pancreatectomy diabetes (H)       celecoxib 100 MG capsule    celeBREX    60 capsule    Take 1 capsule (100 mg) by mouth 2 times daily    Chronic abdominal pain       cetirizine 10 MG tablet    zyrTEC     Take 10 mg by mouth daily    Elevated liver enzymes       diphenhydrAMINE 25 MG capsule    BENADRYL ALLERGY    56 capsule    Take 1 capsule (25 mg) by mouth every 6 hours as needed for itching or allergies    Itching, Post-pancreatectomy diabetes (H), History of pancreatectomy, Pancreatic insufficiency       docusate sodium 100 MG capsule    COLACE    60 capsule    Take 1 capsule (100 mg) by mouth 2 times daily as needed for constipation,    Itching, Post-pancreatectomy diabetes (H), History of pancreatectomy, Pancreatic insufficiency       FOLIC ACID PO      Take 1 mg by mouth daily        glucose 40 % Gel gel     3 Tube    Take 15-30 g by mouth every 15 minutes as needed for low blood sugar    Acquired total absence of pancreas       insulin aspart 100 UNIT/ML injection    NovoLOG PENFILL    3 mL    Administer subcutaneously 0.5 unit per 45 grams of carbohydrates.    Post-pancreatectomy diabetes (H)       insulin pen needle 32G X 4 MM    BD KEN U/F    100 each    Use 6-8 times per day    Acquired total absence of pancreas       LANsoprazole 30 MG CR capsule    PREVACID    30 capsule    Take 1 capsule (30 mg) by mouth daily Take 30-60 minutes before a meal.    Post-pancreatectomy diabetes (H), Pancreatic insufficiency       Lubiprostone 8 MCG Caps capsule    AMITIZA    150 capsule    Take 5 capsules (40 mcg) by mouth daily Take 3 capsules in AM and 2 capsules in PM    Other constipation       multivitamin CF formula capsule    CHOICEFUL     Take 1 tablet by mouth daily    Itching, Post-pancreatectomy diabetes (H), History of pancreatectomy, Pancreatic insufficiency       order for DME     1 Device    Equipment being ordered:Cefaly    Headache disorder       prochlorperazine 5  MG tablet    COMPAZINE    90 tablet    Take two 5 mg tablets by mouth every six hours as needed for nausea.    Itching, Post-pancreatectomy diabetes (H), History of pancreatectomy, Pancreatic insufficiency       SYNTHROID 137 MCG tablet   Generic drug:  levothyroxine      Take 137 mcg by mouth daily        VAGIFEM 10 MCG Tabs vaginal tablet   Generic drug:  estradiol           ZOMIG 5 MG tablet   Generic drug:  ZOLMitriptan      Take 5 mg by mouth at onset of headache for migraine        * Notice:  This list has 2 medication(s) that are the same as other medications prescribed for you. Read the directions carefully, and ask your doctor or other care provider to review them with you.

## 2018-05-16 ENCOUNTER — OFFICE VISIT (OUTPATIENT)
Dept: PSYCHOLOGY | Facility: CLINIC | Age: 53
End: 2018-05-16
Payer: COMMERCIAL

## 2018-05-16 DIAGNOSIS — F43.89 STRESS-RELATED PHYSIOLOGICAL RESPONSE AFFECTING PHYSICAL CONDITION: Primary | ICD-10-CM

## 2018-05-16 ASSESSMENT — PATIENT HEALTH QUESTIONNAIRE - PHQ9: SUM OF ALL RESPONSES TO PHQ QUESTIONS 1-9: 4

## 2018-05-16 NOTE — MR AVS SNAPSHOT
After Visit Summary   5/16/2018    Dinora Mcghee    MRN: [id number]           Patient Information     Date Of Birth          1965        Visit Information        Provider Department      5/16/2018 10:00 AM Maria Elena Abdalla, PhD [facility]        Today's Diagnoses     Stress-related physiological response affecting physical condition    -  1       Follow-ups after your visit        Follow-up notes from your care team     Return in about 3 weeks (around 6/6/2018).      Your next 10 appointments already scheduled     Jun 06, 2018  4:20 PM CDT   (Arrive by 4:05 PM)   Return Visit with Vijaya Genao MD   [facility] ([facility])    [address]  4th Floor  [facility] [id number] [phone]            Sep 14, 2018 11:30 AM CDT   (Arrive by 11:15 AM)   Return Visit with Anita Becerra MD   [facility] Rhe[facility] ([facility])    [address]  [address]  [place] [facility] [address] [phone]              Who to contact     Please call your clinic at [phone] to:    Ask questions about your health    Make or cancel appointments    Discuss your medicines    Learn about your test results    Speak to your doctor            Additional Information About Your Visit        [employer] Information     [employer] gives you secure access to your electronic health record. If you see a primary care provider, you can also send messages to your care team and make appointments. If you have questions, please call your primary care clinic.  If you do not have a primary care provider, please call [phone] and they will assist you.      [employer] is an electronic gateway that provides easy, online access to your medical records. With [employer], you can request a clinic appointment, read your test results, renew a prescription or communicate with your care team.     To access your  existing account, please contact your [facility] or call [phone] for assistance.        Care EveryWhere ID     This is your Care EveryWhere ID. This could be used by other organizations to access your [place] medical records  [id number]         Blood Pressure from Last 3 Encounters:   05/15/18 114/76   03/23/18 114/70   02/28/18 111/64    Weight from Last 3 Encounters:   05/15/18 68.5 kg (151 lb)   03/23/18 67.2 kg (148 lb 3.2 oz)   02/28/18 66.8 kg (147 lb 3.2 oz)              Today, you had the following     No orders found for display       Primary Care Provider Office Phone # Fax #    Justyna Lawson [phone]       [facility] [address] [facility] [address]  [place] [address]        Equal Access to Services     [facility]: Hadii aad ku hadasho Soomaali, waaxda luqadaha, qaybta kaalmada adeegyada, waxay idiin hayaan adeeg kharaletty wallace . So [facility] [phone].    ATENCIÓN: Si habla español, tiene a muñoz disposición servicios gratuitos de asistencia lingüística. Llame al [phone].    We comply with applicable federal civil rights laws and [place] laws. We do not discriminate on the basis of race, color, national origin, age, disability, sex, sexual orientation, or gender identity.            Thank you!     Thank you for choosing [facility]  for your care. Our goal is always to provide you with excellent care. Hearing back from our patients is one way we can continue to improve our services. Please take a few minutes to complete the written survey that you may receive in the mail after your visit with us. Thank you!             Your Updated Medication List - Protect others around you: Learn how to safely use, store and throw away your medicines at www.disposemymeds.org.          This list is accurate as of 5/16/18 11:23 AM.  Always use your most recent med list.                   Brand Name Dispense Instructions for use Diagnosis     acetaminophen 500 MG Caps      Take 2 mg by mouth 2 times daily        * amitriptyline 50 MG tablet    ELAVIL    60 tablet    Take 1 tablet (50 mg) by mouth At Bedtime    Headache disorder       * amitriptyline 10 MG tablet    ELAVIL    60 tablet    Take 1 tablet (10 mg) by mouth At Bedtime    Headache disorder       amylase-lipase-protease 75656-95785 units Cpep per EC capsule    CREON 24    450 capsule    Take 3-4 capsules by mouth with meals and 1-2 with snacks. Maximum 15 capsules per day.    Acquired total absence of pancreas       aspirin 81 MG EC tablet     90 tablet    Take 1 tablet (81 mg) by mouth daily    High platelet count (H)       azelastine 0.1 % spray    ASTELIN     Spray 1 spray into both nostrils 2 times daily        BD SHARPS  Misc     1 each    1 Container as needed    Post-pancreatectomy diabetes (H)       blood glucose monitoring lancets     1 Box    Use to test blood sugar 6-8 times daily or as directed.    Acute on chronic pancreatitis (H)       blood glucose monitoring test strip    PAT CONTOUR NEXT    2 Box    Use to test blood sugar 6 times daily or as directed.    Post-pancreatectomy diabetes (H)       celecoxib 100 MG capsule    celeBREX    60 capsule    Take 1 capsule (100 mg) by mouth 2 times daily    Chronic abdominal pain       cetirizine 10 MG tablet    zyrTEC     Take 10 mg by mouth daily    Elevated liver enzymes       diphenhydrAMINE 25 MG capsule    BENADRYL ALLERGY    56 capsule    Take 1 capsule (25 mg) by mouth every 6 hours as needed for itching or allergies    Itching, Post-pancreatectomy diabetes (H), History of pancreatectomy, Pancreatic insufficiency       docusate sodium 100 MG capsule    COLACE    60 capsule    Take 1 capsule (100 mg) by mouth 2 times daily as needed for constipation,    Itching, Post-pancreatectomy diabetes (H), History of pancreatectomy, Pancreatic insufficiency       FOLIC ACID PO      Take 1 mg by mouth daily        glucose 40 % Gel gel      3 Tube    Take 15-30 g by mouth every 15 minutes as needed for low blood sugar    Acquired total absence of pancreas       insulin aspart 100 UNIT/ML injection    NovoLOG PENFILL    3 mL    Administer subcutaneously 0.5 unit per 45 grams of carbohydrates.    Post-pancreatectomy diabetes (H)       insulin pen needle 32G X 4 MM    BD KEN U/F    100 each    Use 6-8 times per day    Acquired total absence of pancreas       LANsoprazole 30 MG CR capsule    PREVACID    30 capsule    Take 1 capsule (30 mg) by mouth daily Take 30-60 minutes before a meal.    Post-pancreatectomy diabetes (H), Pancreatic insufficiency       Lubiprostone 8 MCG Caps capsule    AMITIZA    150 capsule    Take 5 capsules (40 mcg) by mouth daily Take 3 capsules in AM and 2 capsules in PM    Other constipation       multivitamin CF formula capsule    CHOICEFUL     Take 1 tablet by mouth daily    Itching, Post-pancreatectomy diabetes (H), History of pancreatectomy, Pancreatic insufficiency       order for DME     1 Device    Equipment being ordered:Cefaly    Headache disorder       prochlorperazine 5 MG tablet    COMPAZINE    90 tablet    Take two 5 mg tablets by mouth every six hours as needed for nausea.    Itching, Post-pancreatectomy diabetes (H), History of pancreatectomy, Pancreatic insufficiency       SYNTHROID 137 MCG tablet   Generic drug:  levothyroxine      Take 137 mcg by mouth daily        VAGIFEM 10 MCG Tabs vaginal tablet   Generic drug:  estradiol           ZOMIG 5 MG tablet   Generic drug:  ZOLMitriptan      Take 5 mg by mouth at onset of headache for migraine        * Notice:  This list has 2 medication(s) that are the same as other medications prescribed for you. Read the directions carefully, and ask your doctor or other care provider to review them with you.

## 2018-05-16 NOTE — PROGRESS NOTES
[facility]   Medical    Psychology Progress Note    Patient Name: Dinora Mcghee    YOB: 1965   Medical Record Number: [id number]  Date: 5/16/2018              SUBJECTIVE            Interval history:   RD  Recommended adding folate and since then her migraines  Markedly  reduced  And GI  Is better and more energy    Learning more re micro nutrients   Overall doing so much better setting boundaries.  Still trouble sleeping  With initial insomnia and waking up frequently.  Attributes this to worry.    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      OBJECTIVE:              Length of Visit: 45 minutes        Mental status: No Distress - Normal Mental Status          Session objective:   Continued behavioral pain management skill improvement         Behavioral inventions and response:                            CBT   On classical conditioning patterns    And self talk      Re worry situations                                                                ASSESSMENT              Diagnoses:                                                F43.8      Other stress related disorder                                                               Psychosocial and Contextual Factors: had fluctuating course         Progress toward goals: good.            2  PLAN:               Next Appointment: Dinora Mcghee will schedule a follow-up appointment in 3 weeks.         Assignment: Establish a daily routine of stretching and exercise, Healthy breathing skills  for daily use and auditory EMDR         Objectives / interventions for next session:          Management of Anxiety. Goals include: CBT (adapt constructive thought processes and beliefs regarding fears) and Stress management: address sleep and patterns of anticipatory anxiety re insomnia        Last treatment plan signed:  Today   Treatment plan due:          August 16                        Maria Elena Abdalla, Ph.D., LZAHEER. ...............5/16/2018 10:06 AM   Time out  10: 52                Medical Psychologist

## 2018-05-20 PROBLEM — K94.13 MALFUNCTIONING JEJUNOSTOMY TUBE (H): Status: RESOLVED | Noted: 2017-01-22 | Resolved: 2018-05-20

## 2018-05-20 PROBLEM — K86.1 CHRONIC PANCREATITIS (H): Status: RESOLVED | Noted: 2017-01-17 | Resolved: 2018-05-20

## 2018-05-20 PROBLEM — K86.89 PANCREATIC INSUFFICIENCY: Status: ACTIVE | Noted: 2017-01-17

## 2018-05-20 PROBLEM — K43.2 INCISIONAL HERNIA, WITHOUT OBSTRUCTION OR GANGRENE: Status: ACTIVE | Noted: 2018-05-20

## 2018-05-20 PROBLEM — G89.18 ACUTE POST-OPERATIVE PAIN: Status: RESOLVED | Noted: 2017-01-17 | Resolved: 2018-05-20

## 2018-05-20 PROBLEM — E86.0 DEHYDRATION: Status: RESOLVED | Noted: 2017-01-12 | Resolved: 2018-05-20

## 2018-06-04 ASSESSMENT — ENCOUNTER SYMPTOMS
NAUSEA: 1
VOMITING: 1
SORE THROAT: 0
TASTE DISTURBANCE: 0
SINUS CONGESTION: 1
NIGHT SWEATS: 1
DECREASED CONCENTRATION: 1
HEADACHES: 1
POLYDIPSIA: 0
SMELL DISTURBANCE: 0
SINUS PAIN: 1
WEIGHT LOSS: 0
NECK MASS: 0
NUMBNESS: 0
INCREASED ENERGY: 0
MEMORY LOSS: 0
CHILLS: 0
CONSTIPATION: 0
NERVOUS/ANXIOUS: 1
DIARRHEA: 1
JAUNDICE: 0
DISTURBANCES IN COORDINATION: 0
TINGLING: 1
RECTAL PAIN: 0
ALTERED TEMPERATURE REGULATION: 1
LOSS OF CONSCIOUSNESS: 0
ABDOMINAL PAIN: 1
FATIGUE: 1
WEIGHT GAIN: 1
PARALYSIS: 0
HALLUCINATIONS: 0
TREMORS: 0
WEAKNESS: 0
FEVER: 0
DECREASED APPETITE: 0
EYE REDNESS: 1
POLYPHAGIA: 0
EYE IRRITATION: 1
DEPRESSION: 0
DIZZINESS: 0
PANIC: 0
BOWEL INCONTINENCE: 0
TROUBLE SWALLOWING: 0
BLOOD IN STOOL: 0
DOUBLE VISION: 0
SEIZURES: 0
HOARSE VOICE: 0
EYE WATERING: 0
EYE PAIN: 1
BLOATING: 1
HEARTBURN: 0
INSOMNIA: 1
SPEECH CHANGE: 0

## 2018-06-05 ENCOUNTER — TELEPHONE (OUTPATIENT)
Dept: GASTROENTEROLOGY | Facility: CLINIC | Age: 53
End: 2018-06-05

## 2018-06-05 NOTE — TELEPHONE ENCOUNTER
Left message for patient reminding her of her appt with Dr. Genao in GI clinic on 6/6/18 @ 4:20pm. Left clinic number for patient to call back if she has any questions or needs to cancel/reschedule appt.    Martha WRIGHT LPN

## 2018-06-06 ENCOUNTER — OFFICE VISIT (OUTPATIENT)
Dept: GASTROENTEROLOGY | Facility: CLINIC | Age: 53
End: 2018-06-06
Payer: COMMERCIAL

## 2018-06-06 VITALS
BODY MASS INDEX: 22.78 KG/M2 | WEIGHT: 150.3 LBS | TEMPERATURE: 98.5 F | DIASTOLIC BLOOD PRESSURE: 74 MMHG | HEART RATE: 106 BPM | SYSTOLIC BLOOD PRESSURE: 107 MMHG | HEIGHT: 68 IN | OXYGEN SATURATION: 100 %

## 2018-06-06 DIAGNOSIS — K21.9 GASTROESOPHAGEAL REFLUX DISEASE WITHOUT ESOPHAGITIS: Primary | ICD-10-CM

## 2018-06-06 DIAGNOSIS — R14.0 BLOATING: ICD-10-CM

## 2018-06-06 DIAGNOSIS — K59.09 OTHER CONSTIPATION: ICD-10-CM

## 2018-06-06 DIAGNOSIS — K31.84 GASTROPARESIS: ICD-10-CM

## 2018-06-06 DIAGNOSIS — R11.2 NON-INTRACTABLE VOMITING WITH NAUSEA, UNSPECIFIED VOMITING TYPE: ICD-10-CM

## 2018-06-06 ASSESSMENT — PAIN SCALES - GENERAL: PAINLEVEL: NO PAIN (0)

## 2018-06-06 NOTE — NURSING NOTE
"Chief Complaint   Patient presents with     Gastrointestinal Problem     f/u        Vitals:    06/06/18 1616   BP: 107/74   Pulse: 106   Temp: 98.5  F (36.9  C)   TempSrc: Oral   SpO2: 100%   Weight: 68.2 kg (150 lb 4.8 oz)   Height: 1.727 m (5' 8\")       Body mass index is 22.85 kg/(m^2).      WOUND EVALUATION:                        "

## 2018-06-06 NOTE — LETTER
6/6/2018       RE: Dinora Mcghee  [address] [facility] [address]     Dear Colleague,    Thank you for referring your patient, Dinora Mcghee, to the [facility] GASTRO[facility]OLOGY AND IBD CLINIC at [facility]. Please see a copy of my visit note below.    GI CLINIC VISIT    CC:  Follow-up      ASSESSMENT/PLAN:  (K21.9) Gastroesophageal reflux disease without esophagitis  (primary encounter diagnosis)  (K31.84) Gastroparesis  (R11.2) Non-intractable vomiting with nausea, unspecified vomiting type  (K59.09) Other constipation  (R14.0) Bloating  Comment: **  Plan:   1. Continue on the low gas diet  2. Continue lifestyle changes to limit aerophagia (no carbonated beverages, no straws, no gum, no hard candy). Eat slowly.   3. Continue your efforts for daily exercise, goal of walking at least 3 miles a day. Continue your efforts to work on improving your core strength.   4. Continue your Amitiza 3 tabs at night and 2 tabs in the morning.   5. Continue daily senna and colace.   6 Continue bisacodyl as needed if three days without a BM.   7. Continue your efforts to have 5-6 small meals/snacks a day.   8. Limit high fiber and high fat loads to your stomach.   9. Continue your pancreatic enzymes.  10. Ok for a trial off of lansoprazole. Ok to stop this and start ranitidine 150 mg daily. If having increased heartburn immediately after the switch, ok for Tums during that time.   11. Update GI clinic in 2-3 weeks on your reflux.   12. Follow-up in GI clinic in 3-4 months with Dr. Genao or ANA Rojas.        It was a pleasure to participate in the care of this patient; please contact us with any further questions.  A total of 30 face-to-face minutes was spent with this patient, >50% of which was counseling regarding the above delineated issues.    Vijaya Genao MD   of Medicine  Division of Gastroenterology, Hepatology and Nutrition  [facility]  "[place]    ---------------------------------------------------------------------------------------------------  HPI:    Ms. 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 She returns today for follow-up.          1. Abdominal pain  Taken/summarized from my prior documentation:    Ms. Loo had issues with severe daily abdominal pain prior to her TPIAT. Overall she felt she was doing well after surgery and was able to be weaned off of narcotics though she states she still follows in the pain clinic. Three or four times since April 2017 (about once every other month), she has experienced pronounced abdominal pain similar in nature to her prior chronic pancreatic pain. The pain is localized in the epigastric region and just under her right 12th rib; the pain does not radiate beyond that point and is described as \"sharp.\" It did wake her from sleep on one occasion prompting an ED visit on 5/20/17. At that time pain was reported in the LUQ and she was seen to on CT have left abdominal wall fat stranding but with no associated cellulitis or intra-abdominal pathology. An MR abd done a month prior demonstrated a subQ fluid collection in this area. She was discharged to home from the ED. An MRI done thereafter in July 2017 did not show persistance of this finding and heterogenous area of enhancement in the dome of the liver (initially seen on 5/20/17 CT) was improved.    She expresses much concern that this is a recurrence of her prior pancreas pain, though she feels it shouldn't " "happen since \"my pancreas is gone.\" She shares that she has been working with her psychologist in pain clinic on mind-body interventions; they have apparently discussed that this could be a reactivation of prior pain pathways. She cannot provide much more in the way of details of the pain during episodes, what she'd been doing, eating, or how it related to her stool pattern.         Ms. Loo also notes a much less pronounced pain which comes on in the same area, but only with intense exercise. This seems to get better with stopping or walking and has not occurred at other times.       At her last visit she reported some improvement in her deep pain with breathing exercises and relaxation techniques that she's been working on. Today, she feels her pain is stable with no marked change.        2. Gastroparesis  3. Nausea, vomiting  Taken/summarized from my prior documentation:    GES pursued in the setting of chronic nausea and vomiting during her chronic pancreatitis and TPIAT evaluation. She states she had two-hour study at [facility] that may have been consistent with gastroparesis. A follow-up four-hour study was done here on 6/27/16 with 49% seen remaining at 4 hours. Ms. Loo is not sure if she was on narcotics at that time. Per her recollection she didn't start daily pain medications until Fall 2016, but she did use them intermittently prior to that time. A G-J tube was placed in fall 2016 with G-tube used for venting and jejunal extension for tube feeds. After her TPIAT she was able to wean off her TFs and felt she no longer required venting. By spring 2017 she was tolerating a regular diet with no nausea or vomiting and in March 2017 her G-J tube was able to be removed.  Throughout the spring and summer she was on a regular diet and had no bloating, nausea, or vomiting.     Prior to her initial GI consultation she developed \"severe\" migraine HAs with associated vomiting. She describes 12 hours of emesis " and retching with 12+ hours of HA pain similar to her prior migraines. Since then has had frequent HAs with both prolonged migraines (lasting 3 days per report) and frequent migraines (up to 3-5 days out of a week).    She is on chronic amitriptyline for migraine prophylaxis. She has had previously used sumatriptan for migraines, but this last efficacy and some time ago she was switched to zolmitriptan. Unfortunately she does not feel the zolmitriptan is working any longer. She had not seen a neurologist for several years. For her nausea and vomiting she uses scopolamine patch, procholorperazine, or promethazine all with equal effect. She likes the scopolamine but limits it use due to side effects. We did refer her to neurology for additional evaluation.     At her last GI visit Ms. Loo reported 6 episodes of emesis in the preceding four months, Of these episode at least a few were noted that after large liquid ingestions where she will feel the need to burp and will bring up some of the liquid with eructation.     She was able to see a neurologist recently who recommended a dose increase in her amitriptyline. Ms. Loo feels her migraines have decreased from 10-12 each month to 4-5 in a month.      Today, Maybe vomited three times since last visit. Reports this was mostly with migraines. Migraines much better - now on 60 of amitriptyline. Sleep is still difficult - continues to work with Dr. Canchola of neurology and Maria Elena Abdalla psych with pain clinic on this. She worries that maybe fear of pain keeps her up.    Energy is somewhat better.      4. Constipation  5. Bloating  Taken/summarized from my prior documentation:   Ms. Loo reports that she's had constipation ongoing for years which began after she had her first child (20+ years ago). She denies any issues with her bowels as an infant or child, and reports her baseline pattern was moving her bowels every day to every other day up until her  "pregnancy.       She had a bowel cleanout around the time her symptoms began and has been on Miralax intermittently since then. She began more regular senna use around 2015/2016 and then colace was started a couple months before her TPIAT (2016, in the setting of increased pain medication dosing). She has intermittently done bowel cleanouts through her primary care clinic or as recommended by Dr. Phoenix; these cleanouts were generally with Miralax/Gatorade prep and she reported good results.  Post-TPIAT she did require intermittent enemas and suppositories while still on opiates but has not continued these medications.       By Nov 2017 she was on senna twice daily and colace twice daily since her TPIAT. She had been on Milk of Mg BID and Miralax as needed, but was still experiencing constipation. A few months after her surgery she was started on Linzess. She reported severed diarrhea after 1-2 doses and thus discontinued the medication. She was then placed on Amitiza, ultimately titrating to 24 mcg in morning and night. Intially she was having BMs daily, but has since had longer periods of time without BMs.      When we first met,  her pattern was quite variable with she treated with varying her Amitiza dose which we felt was perpetuating the pattern (please see initial GI consultation Nov 2017 for further details). We worked on a steady Amitiza dose with decreasing amounts of senna and colace on days of looser stools. Her Creon dose has remained stable at about 15 capsules each day, with no noted steatorrhea.        Since her last GI clinic visit, Ms. Loo feels her pattern has improved with fewer \"loose\" days and fewer \"constipated\" days. She is taking Amitiza 3 tabs, 1 senna, and 1 colace before bed. In the morning she takes another senna and colace. Currently she stools 6 days each week. Generally she will have a morning cluster of 2-3 stools, [place] 3-4. She may have some urgency, but no fecal incontinence. " "She may have another stool after exercising. She has only required bisacodyl a few times for \"constipated\" days/unsatisfactory BMs.     About 2-3 days each month she notes looser stools. Stools are passed as a few clusters during the day, Butler 5-6 with urgency. The stools are \"floaty\" but she hasn't noted any grease in the toilet bowl. She does have increased flatus. She has not tried increasing her Creon on these days and expresses a lot of concern about adjusting Creon dosing. She also shares concern about bloating discomfort with increased belching and flatus. This bloating does improve after passing flatus or stooling. Through Dr. Phoenix in pancreas txplt clinic, Ms. Loo was given metronidazole for SIBO to see if this led to any improvement. Unfortunately, Ms. Loo developed a \"severe rash\" while on the metronidazole. This resolved shortly after the medication was discontinued.       **Today  Saw RD at [place], doubled-multivitamin and started folate.  Wondering about testing for micronutrients.       Took rifaximin. Bloating improved right away, felt stool improved towards end of course (less floaty, less greasy, more formed, brown color).  OVerall feels much better. Has had intermittent bloating since then - much shorter lastly.  Stools, typically 3 daily (right away in morning, after breakfast, and after exercise). No stress incontinence - not having leakage. Still on Amitiza and colace. Did stop nightly senna, still on a.m. Senna.     Feeling heaviness, like things shift into low abd/low pelvis. This happens at end of day. Wonders if full of stool. Doesn't really have rectal symptoms, may infrequently have rectal fullness. Sometimes passes flatus and this improves rectal pressure. Hasn't felt need to use suppositories. Feels low gas diet does really help with bloating.       Has incisional hernia - planned for repair with Dr. Phoenix        6. GERD  7. Dysphagia  Taken/summarized from my prior " documentation:    Her GERD is experienced as substernal and throat burning as well as bringing up of nocturnal refluxant (would awaken choking) and refluxant with bending over. This was worked up by several GI providers and, most recently, she was seen by Dr. George Flores here at [facility]. She reports a prior Schatzki's ring which required dilation in the past. Esophageal manometry done here was normal; pH impedence revealed a DeMeester of 24 with positive symptom association.  Recommendations at her most recent esophageal clinic visit in Jan 2016 were for BID PPI with consideration of BID H2 blocker if still symptomatic. A trial of carafate did not improve her GERD. Ms. Loo states that pursuit of Linx procedure was discussed with Dr. George Flores. She has done well with daily lansoprazole and lifestyle/behavioural changes and thus has not pursued it yet.a.      Today, doesn't feel like had symptoms since TPIAT. Wonders if needs to continue lifelong.       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  Document previously scanned on 1/12/17.  Validation form completed and scanned.  Code Status reflects choices in most recent ACP document..  Confirmed/documented designated decision maker(s).  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 Provider to review and confirm.       GERD (gastroesophageal reflux disease) 12/01/2010     Priority: Medium     Lumbago 04/18/2005     Priority: Medium     History of L5-S1 degenerative disk disease.         Sprain and strain of other specified sites of hip and thigh 12/09/2002     Priority: Medium     Chondromalacia of patella 12/09/2002     Priority: Medium     Need for prophylactic immunotherapy      Priority: Medium     trees, grass, srw, dust mites, cat       Allergic rhinitis due to other allergen 12/21/2001     Priority: Medium     Hypothyroidism      Priority: Medium     Problem list name updated by automated process. Provider to review       Sensorineural hearing loss 01/10/2005     Priority: Medium     Problem list name updated by automated process. Provider to review       Vertiginous syndrome and labyrinthine disorder 01/10/2005     Priority: Medium     Problem list name updated by automated process. Provider to review         PERTINENT MEDICATIONS:  Current Outpatient Prescriptions   Medication     acetaminophen 500 MG CAPS     amitriptyline (ELAVIL) 10 MG tablet     amitriptyline (ELAVIL) 50 MG tablet     amylase-lipase-protease (CREON 24) 06816-44040 UNITS CPEP per EC capsule     aspirin 81 MG EC tablet     azelastine (ASTELIN) 0.1 % spray     blood glucose monitoring (PAT CONTOUR NEXT) test strip     blood glucose monitoring (PAT MICROLET) lancets     celecoxib (CELEBREX) 100 MG capsule     cetirizine (ZYRTEC) 10 MG tablet     diphenhydrAMINE (BENADRYL ALLERGY) 25 MG capsule     docusate sodium (COLACE) 100 MG capsule     estradiol (VAGIFEM) 10 MCG TABS vaginal tablet     FOLIC ACID PO     glucose 40 % GEL gel     insulin aspart (NOVOLOG PENFILL) 100 UNIT/ML injection     insulin pen needle (BD KEN  "U/F) 32G X 4 MM     LANsoprazole (PREVACID) 30 MG CR capsule     levothyroxine (SYNTHROID) 137 MCG tablet     Lubiprostone (AMITIZA) 8 MCG CAPS capsule     multivitamin CF formula (CHOICEFUL) capsule     order for DME     prochlorperazine (COMPAZINE) 5 MG tablet     Sharps Container (BD SHARPS ) MISC     ZOLMitriptan (ZOMIG) 5 MG tablet     No current facility-administered medications for this visit.          PHYSICAL EXAMINATION:  Vitals /74  Pulse 106  Temp 98.5  F (36.9  C) (Oral)  Ht 1.727 m (5' 8\")  Wt 68.2 kg (150 lb 4.8 oz)  SpO2 100%  BMI 22.85 kg/m2   Wt   Wt Readings from Last 2 Encounters:   06/06/18 68.2 kg (150 lb 4.8 oz)   05/15/18 68.5 kg (151 lb)   Gen: Pt sitting up in NAD, interactive and cooperative on exam  Eyes: sclerae anicteric, no injection  ENT:  OP clear, MMM  Cardiac: RRR, nl S1, S2  Resp: Clear on anterior exam  GI: Normoactive BS, abd soft, flat, nontender  Skin: Warm, dry, no jaundice  Neuro: alert, oriented, answers questions appropriately      PERTINENT STUDIES:    Orders Only on 03/23/2018   Component Date Value Ref Range Status     WBC 03/23/2018 4.1  4.0 - 11.0 10e9/L Final     RBC Count 03/23/2018 4.46  3.8 - 5.2 10e12/L Final     Hemoglobin 03/23/2018 13.9  11.7 - 15.7 g/dL Final     Hematocrit 03/23/2018 41.7  35.0 - 47.0 % Final     MCV 03/23/2018 94  78 - 100 fl Final     MCH 03/23/2018 31.2  26.5 - 33.0 pg Final     MCHC 03/23/2018 33.3  31.5 - 36.5 g/dL Final     RDW 03/23/2018 13.8  10.0 - 15.0 % Final     Platelet Count 03/23/2018 382  150 - 450 10e9/L Final     Diff Method 03/23/2018 Automated Method   Final     % Neutrophils 03/23/2018 35.9  % Final     % Lymphocytes 03/23/2018 41.8  % Final     % Monocytes 03/23/2018 14.8  % Final     % Eosinophils 03/23/2018 4.6  % Final     % Basophils 03/23/2018 2.9  % Final     % Immature Granulocytes 03/23/2018 0.0  % Final     Nucleated RBCs 03/23/2018 0  0 /100 Final     Absolute Neutrophil 03/23/2018 1.5* " 1.6 - 8.3 10e9/L Final     Absolute Lymphocytes 03/23/2018 1.7  0.8 - 5.3 10e9/L Final     Absolute Monocytes 03/23/2018 0.6  0.0 - 1.3 10e9/L Final     Absolute Eosinophils 03/23/2018 0.2  0.0 - 0.7 10e9/L Final     Absolute Basophils 03/23/2018 0.1  0.0 - 0.2 10e9/L Final     Abs Immature Granulocytes 03/23/2018 0.0  0 - 0.4 10e9/L Final     Absolute Nucleated RBC 03/23/2018 0.0   Final     Sodium 03/23/2018 138  133 - 144 mmol/L Final     Potassium 03/23/2018 4.1  3.4 - 5.3 mmol/L Final     Chloride 03/23/2018 104  94 - 109 mmol/L Final     Carbon Dioxide 03/23/2018 29  20 - 32 mmol/L Final     Anion Gap 03/23/2018 6  3 - 14 mmol/L Final     Glucose 03/23/2018 101* 70 - 99 mg/dL Final     Urea Nitrogen 03/23/2018 12  7 - 30 mg/dL Final     Creatinine 03/23/2018 0.68  0.52 - 1.04 mg/dL Final     GFR Estimate 03/23/2018 >90  >60 mL/min/1.7m2 Final     GFR Estimate If Black 03/23/2018 >90  >60 mL/min/1.7m2 Final     Calcium 03/23/2018 9.0  8.5 - 10.1 mg/dL Final     Bilirubin Direct 03/23/2018 0.2  0.0 - 0.2 mg/dL Final     Bilirubin Total 03/23/2018 0.8  0.2 - 1.3 mg/dL Final     Albumin 03/23/2018 3.9  3.4 - 5.0 g/dL Final     Protein Total 03/23/2018 7.7  6.8 - 8.8 g/dL Final     Alkaline Phosphatase 03/23/2018 160* 40 - 150 U/L Final     ALT 03/23/2018 64* 0 - 50 U/L Final     AST 03/23/2018 44  0 - 45 U/L Final     IGG 03/23/2018 1200  695 - 1620 mg/dL Final     IgG1 03/23/2018 408  300 - 856 mg/dL Final     IgG2 03/23/2018 397  158 - 761 mg/dL Final     IgG3 03/23/2018 92  24 - 192 mg/dL Final     IgG4 03/23/2018 80  11 - 86 mg/dL Final     TSH 03/23/2018 0.74  0.40 - 4.00 mU/L Final     T4 Free 03/23/2018 1.13  0.76 - 1.46 ng/dL Final       Again, thank you for allowing me to participate in the care of your patient.      Sincerely,    Vijaya Genao MD

## 2018-06-06 NOTE — MR AVS SNAPSHOT
After Visit Summary   6/6/2018    Dinora Mcghee    MRN: [id number]           Patient Information     Date Of Birth          1965        Visit Information        Provider Department      6/6/2018 4:20 PM Vijaya Genao MD [facility]        Today's Diagnoses     Gastroesophageal reflux disease without esophagitis    -  1      Care Instructions    1. Continue on the low gas diet  2. Continue lifestyle changes to limit aerophagia (no carbonated beverages, no straws, no gum, no hard candy). Eat slowly.   3. Continue your efforts for daily exercise, goal of walking at least 3 miles a day. Continue your efforts to work on improving your core strength.   4. Continue your Amitiza 3 tabs at night and 2 tabs in the morning.   5. Continue daily senna and colace.   6 Continue bisacodyl as needed if three days without a BM.   7. Continue your efforts to have 5-6 small meals/snacks a day.   8. Limit high fiber and high fat loads to your stomach.   9. Continue your pancreatic enzymes.  10. Ok for a trial off of lansoprazole. Ok to stop this and start ranitidine 150 mg daily. If having increased heartburn immediately after the switch, ok for Tums during that time.   11. Update GI clinic in 2-3 weeks on your reflux.   12. Follow-up in GI clinic in 3-4 months with Dr. Genao or ANA Rojas.     GI clinic fax- [phone]    Just[facility]- [phone]          Follow-ups after your visit        Your next 10 appointments already scheduled     Cong 15, 2018  4:00 PM CDT   (Arrive by 3:45 PM)   Return Visit with Maria Elena Abdalla, PhD   [facility] ([facility])    [address]  [address] [place] [address] [phone]            Aug 23, 2018   Procedure with Nestor Phoenix MD   [facility], [place], Same Day Surgery (--)    [address] [id number] [phone]            Sep 05, 2018  4:20 PM CDT  "  (Arrive by 4:05 PM)   Return Visit with Vijaya Genao MD   [facility] ([facility])    [address]  4th [address]  [place] [facility] [id number] [phone]            Sep 14, 2018 11:30 AM CDT   (Arrive by 11:15 AM)   Return Visit with Anita Becerra MD   [facility] ([facility])    [address]  [address]  [place] [id number] [phone]              Who to contact     Please call your clinic at [phone] to:    Ask questions about your health    Make or cancel appointments    Discuss your medicines    Learn about your test results    Speak to your doctor            Additional Information About Your Visit        [employer] Information     [email or web address] gives you secure access to your electronic health record. If you see a primary care provider, you can also send messages to your care team and make appointments. If you have questions, please call your primary care clinic.  If you do not have a primary care provider, please call [phone] and they will assist you.      [email or web address] is an electronic gateway that provides easy, online access to your medical records. With [email or web address], you can request a clinic appointment, read your test results, renew a prescription or communicate with your care team.     To access your existing account, please contact your [facility] or call [phone] for assistance.        Care EveryWhere ID     This is your Care EveryWhere ID. This could be used by other organizations to access your [facility] medical records  [id number]        Your Vitals Were     Pulse Temperature Height Pulse Oximetry BMI (Body Mass Index)       106 98.5  F (36.9  C) (Oral) 1.727 m (5' 8\") 100% 22.85 kg/m2        Blood Pressure from Last 3 Encounters:   06/06/18 107/74   05/15/18 114/76   03/23/18 114/70    Weight from Last 3 Encounters:   06/06/18 68.2 kg (150 lb " 4.8 oz)   05/15/18 68.5 kg (151 lb)   03/23/18 67.2 kg (148 lb 3.2 oz)              Today, you had the following     No orders found for display         Today's Medication Changes          These changes are accurate as of 6/6/18  5:25 PM.  If you have any questions, ask your nurse or doctor.               Start taking these medicines.        Dose/Directions    ranitidine 150 MG tablet   Commonly known as:  ZANTAC   Used for:  Gastroesophageal reflux disease without esophagitis   Started by:  Vijaya Genao MD        Dose:  150 mg   Take 1 tablet (150 mg) by mouth At Bedtime   Quantity:  90 tablet   Refills:  3            Where to get your medicines      These medications were sent to [id number] - [place], [place] - [address]  [address] [facility] [address]     Phone:  [phone]     ranitidine 150 MG tablet                Primary Care Provider Office Phone # Fax #    Justyna Lawson [phone]       [facility] [address]  [place] [address]        Equal Access to Services     [facility] AH: Hadii aad ku hadasho Soomaali, waaxda luqadaha, qaybta kaalmada adeegyada, waxdorian wallace . So [facility] [phone].    ATENCIÓN: Si habla español, tiene a muñoz disposición servicios gratuitos de asistencia lingüística. [facility] [phone].    We comply with applicable federal civil rights laws and [place] laws. We do not discriminate on the basis of race, color, national origin, age, disability, sex, sexual orientation, or gender identity.            Thank you!     Thank you for choosing [facility]  for your care. Our goal is always to provide you with excellent care. Hearing back from our patients is one way we can continue to improve our services. Please take a few minutes to complete the written survey that you may receive in the mail after your visit with us. Thank you!             Your Updated  Medication List - Protect others around you: Learn how to safely use, store and throw away your medicines at www.disposemymeds.org.          This list is accurate as of 6/6/18  5:25 PM.  Always use your most recent med list.                   Brand Name Dispense Instructions for use Diagnosis    acetaminophen 500 MG Caps      Take 2 mg by mouth 2 times daily        * amitriptyline 50 MG tablet    ELAVIL    60 tablet    Take 1 tablet (50 mg) by mouth At Bedtime    Headache disorder       * amitriptyline 10 MG tablet    ELAVIL    60 tablet    Take 1 tablet (10 mg) by mouth At Bedtime    Headache disorder       amylase-lipase-protease 07608-85532 units Cpep per EC capsule    CREON 24    450 capsule    Take 3-4 capsules by mouth with meals and 1-2 with snacks. Maximum 15 capsules per day.    Acquired total absence of pancreas       aspirin 81 MG EC tablet     90 tablet    Take 1 tablet (81 mg) by mouth daily    High platelet count (H)       azelastine 0.1 % spray    ASTELIN     Spray 1 spray into both nostrils 2 times daily        BD SHARPS  Misc     1 each    1 Container as needed    Post-pancreatectomy diabetes (H)       blood glucose monitoring lancets     1 Box    Use to test blood sugar 6-8 times daily or as directed.    Acute on chronic pancreatitis (H)       blood glucose monitoring test strip    PAT CONTOUR NEXT    2 Box    Use to test blood sugar 6 times daily or as directed.    Post-pancreatectomy diabetes (H)       celecoxib 100 MG capsule    celeBREX    60 capsule    Take 1 capsule (100 mg) by mouth 2 times daily    Chronic abdominal pain       cetirizine 10 MG tablet    zyrTEC     Take 10 mg by mouth daily    Elevated liver enzymes       diphenhydrAMINE 25 MG capsule    BENADRYL ALLERGY    56 capsule    Take 1 capsule (25 mg) by mouth every 6 hours as needed for itching or allergies    Itching, Post-pancreatectomy diabetes (H), History of pancreatectomy, Pancreatic insufficiency       docusate  sodium 100 MG capsule    COLACE    60 capsule    Take 1 capsule (100 mg) by mouth 2 times daily as needed for constipation,    Itching, Post-pancreatectomy diabetes (H), History of pancreatectomy, Pancreatic insufficiency       FOLIC ACID PO      Take 1 mg by mouth daily        glucose 40 % Gel gel     3 Tube    Take 15-30 g by mouth every 15 minutes as needed for low blood sugar    Acquired total absence of pancreas       insulin aspart 100 UNIT/ML injection    NovoLOG PENFILL    3 mL    Administer subcutaneously 0.5 unit per 45 grams of carbohydrates.    Post-pancreatectomy diabetes (H)       insulin pen needle 32G X 4 MM    BD KEN U/F    100 each    Use 6-8 times per day    Acquired total absence of pancreas       LANsoprazole 30 MG CR capsule    PREVACID    30 capsule    Take 1 capsule (30 mg) by mouth daily Take 30-60 minutes before a meal.    Post-pancreatectomy diabetes (H), Pancreatic insufficiency       Lubiprostone 8 MCG Caps capsule    AMITIZA    150 capsule    Take 5 capsules (40 mcg) by mouth daily Take 3 capsules in AM and 2 capsules in PM    Other constipation       multivitamin CF formula capsule    CHOICEFUL     Take 1 tablet by mouth daily    Itching, Post-pancreatectomy diabetes (H), History of pancreatectomy, Pancreatic insufficiency       order for DME     1 Device    Equipment being ordered:Cefaly    Headache disorder       prochlorperazine 5 MG tablet    COMPAZINE    90 tablet    Take two 5 mg tablets by mouth every six hours as needed for nausea.    Itching, Post-pancreatectomy diabetes (H), History of pancreatectomy, Pancreatic insufficiency       ranitidine 150 MG tablet    ZANTAC    90 tablet    Take 1 tablet (150 mg) by mouth At Bedtime    Gastroesophageal reflux disease without esophagitis       SYNTHROID 137 MCG tablet   Generic drug:  levothyroxine      Take 137 mcg by mouth daily        VAGIFEM 10 MCG Tabs vaginal tablet   Generic drug:  estradiol           ZOMIG 5 MG tablet    Generic drug:  ZOLMitriptan      Take 5 mg by mouth at onset of headache for migraine        * Notice:  This list has 2 medication(s) that are the same as other medications prescribed for you. Read the directions carefully, and ask your doctor or other care provider to review them with you.

## 2018-06-06 NOTE — PROGRESS NOTES
GI CLINIC VISIT    CC:  Follow-up      ASSESSMENT/PLAN:  (K21.9) Gastroesophageal reflux disease without esophagitis  (primary encounter diagnosis)  (K31.84) Gastroparesis  (R11.2) Non-intractable vomiting with nausea, unspecified vomiting type  (K59.09) Other constipation  (R14.0) Bloating  Comment: **  Plan:   1. Continue on the low gas diet  2. Continue lifestyle changes to limit aerophagia (no carbonated beverages, no straws, no gum, no hard candy). Eat slowly.   3. Continue your efforts for daily exercise, goal of walking at least 3 miles a day. Continue your efforts to work on improving your core strength.   4. Continue your Amitiza 3 tabs at night and 2 tabs in the morning.   5. Continue daily senna and colace.   6 Continue bisacodyl as needed if three days without a BM.   7. Continue your efforts to have 5-6 small meals/snacks a day.   8. Limit high fiber and high fat loads to your stomach.   9. Continue your pancreatic enzymes.  10. Ok for a trial off of lansoprazole. Ok to stop this and start ranitidine 150 mg daily. If having increased heartburn immediately after the switch, ok for Tums during that time.   11. Update GI clinic in 2-3 weeks on your reflux.   12. Follow-up in GI clinic in 3-4 months with Dr. Genao or ANA Rojas.        It was a pleasure to participate in the care of this patient; please contact us with any further questions.  A total of 30 face-to-face minutes was spent with this patient, >50% of which was counseling regarding the above delineated issues.    Vijaya Genao MD   of Medicine  Division of Gastroenterology, Hepatology and Nutrition  [facility]    ---------------------------------------------------------------------------------------------------  HPI:    Ms. 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 She returns today for follow-up.          1. Abdominal pain  Taken/summarized from my prior documentation:    Ms. Loo had issues with severe daily abdominal pain prior to her TPIAT. Overall she felt she was doing well after surgery and was able to be weaned off of narcotics though she states she still follows in the pain clinic. Three or four times since April 2017 (about once every other month), she has experienced pronounced abdominal pain similar in nature to her prior chronic pancreatic pain. The pain is localized in the epigastric region and just under her right 12th rib; the pain does not radiate beyond that point and is described as \"sharp.\" It did wake her from sleep on one occasion prompting an ED visit on 5/20/17. At that time pain was reported in the LUQ and she was seen to on CT have left abdominal wall fat stranding but with no associated cellulitis or intra-abdominal pathology. An MR abd done a month prior demonstrated a subQ fluid collection in this area. She was discharged to home from the ED. An MRI done thereafter in July 2017 did not show persistance of this finding and heterogenous area of enhancement in the dome of the liver (initially seen on 5/20/17 CT) was improved.    She expresses much concern that this is a recurrence of her prior pancreas pain, though she feels it shouldn't happen since \"my pancreas is gone.\" She shares that she has been working with her psychologist in pain clinic on mind-body interventions; they have apparently discussed that this could be a reactivation of prior pain pathways. She cannot provide much more in the way of details of the pain during " "episodes, what she'd been doing, eating, or how it related to her stool pattern.         Ms. Loo also notes a much less pronounced pain which comes on in the same area, but only with intense exercise. This seems to get better with stopping or walking and has not occurred at other times.      At her last visit she reported some improvement in her deep pain with breathing exercises and relaxation techniques that she's been working on. Today, she feels her pain is stable with no marked change.        2. Gastroparesis  3. Nausea, vomiting  Taken/summarized from my prior documentation:    GES pursued in the setting of chronic nausea and vomiting during her chronic pancreatitis and TPIAT evaluation. She states she had two-hour study at [facility] that may have been consistent with gastroparesis. A follow-up four-hour study was done here on 6/27/16 with 49% seen remaining at 4 hours. Ms. Loo is not sure if she was on narcotics at that time. Per her recollection she didn't start daily pain medications until Fall 2016, but she did use them intermittently prior to that time. A G-J tube was placed in fall 2016 with G-tube used for venting and jejunal extension for tube feeds. After her TPIAT she was able to wean off her TFs and felt she no longer required venting. By spring 2017 she was tolerating a regular diet with no nausea or vomiting and in March 2017 her G-J tube was able to be removed.  Throughout the spring and summer she was on a regular diet and had no bloating, nausea, or vomiting.     Prior to her initial GI consultation she developed \"severe\" migraine HAs with associated vomiting. She describes 12 hours of emesis and retching with 12+ hours of HA pain similar to her prior migraines. Since then has had frequent HAs with both prolonged migraines (lasting 3 days per report) and frequent migraines (up to 3-5 days out of a week).    She is on chronic amitriptyline for migraine prophylaxis. She has had " previously used sumatriptan for migraines, but this last efficacy and some time ago she was switched to zolmitriptan. Unfortunately she does not feel the zolmitriptan is working any longer. She had not seen a neurologist for several years. For her nausea and vomiting she uses scopolamine patch, procholorperazine, or promethazine all with equal effect. She likes the scopolamine but limits it use due to side effects. We did refer her to neurology for additional evaluation.     At her last GI visit Ms. Loo reported 6 episodes of emesis in the preceding four months, Of these episode at least a few were noted that after large liquid ingestions where she will feel the need to burp and will bring up some of the liquid with eructation.     She was able to see a neurologist recently who recommended a dose increase in her amitriptyline. Ms. Loo feels her migraines have decreased from 10-12 each month to 4-5 in a month.      Today, Maybe vomited three times since last visit. Reports this was mostly with migraines. Migraines much better - now on 60 of amitriptyline. Sleep is still difficult - continues to work with Dr. Canchola of neurology and Maria Elena Abdalla psych with pain clinic on this. She worries that maybe fear of pain keeps her up.    Energy is somewhat better.      4. Constipation  5. Bloating  Taken/summarized from my prior documentation:   Ms. Loo reports that she's had constipation ongoing for years which began after she had her first child (20+ years ago). She denies any issues with her bowels as an infant or child, and reports her baseline pattern was moving her bowels every day to every other day up until her pregnancy.       She had a bowel cleanout around the time her symptoms began and has been on Miralax intermittently since then. She began more regular senna use around 2015/2016 and then colace was started a couple months before her TPIAT (2016, in the setting of increased pain medication dosing).  "She has intermittently done bowel cleanouts through her primary care clinic or as recommended by Dr. Phoenix; these cleanouts were generally with Miralax/Gatorade prep and she reported good results.  Post-TPIAT she did require intermittent enemas and suppositories while still on opiates but has not continued these medications.       By Nov 2017 she was on senna twice daily and colace twice daily since her TPIAT. She had been on Milk of Mg BID and Miralax as needed, but was still experiencing constipation. A few months after her surgery she was started on Linzess. She reported severed diarrhea after 1-2 doses and thus discontinued the medication. She was then placed on Amitiza, ultimately titrating to 24 mcg in morning and night. Intially she was having BMs daily, but has since had longer periods of time without BMs.      When we first met,  her pattern was quite variable with she treated with varying her Amitiza dose which we felt was perpetuating the pattern (please see initial GI consultation Nov 2017 for further details). We worked on a steady Amitiza dose with decreasing amounts of senna and colace on days of looser stools. Her Creon dose has remained stable at about 15 capsules each day, with no noted steatorrhea.        Since her last GI clinic visit, Ms. Loo feels her pattern has improved with fewer \"loose\" days and fewer \"constipated\" days. She is taking Amitiza 3 tabs, 1 senna, and 1 colace before bed. In the morning she takes another senna and colace. Currently she stools 6 days each week. Generally she will have a morning cluster of 2-3 stools, Alcorn 3-4. She may have some urgency, but no fecal incontinence. She may have another stool after exercising. She has only required bisacodyl a few times for \"constipated\" days/unsatisfactory BMs.     About 2-3 days each month she notes looser stools. Stools are passed as a few clusters during the day, Alcorn 5-6 with urgency. The stools are \"floaty\" but she " "hasn't noted any grease in the toilet bowl. She does have increased flatus. She has not tried increasing her Creon on these days and expresses a lot of concern about adjusting Creon dosing. She also shares concern about bloating discomfort with increased belching and flatus. This bloating does improve after passing flatus or stooling. Through Dr. Phoenix in pancreas txplt clinic, Ms. Loo was given metronidazole for SIBO to see if this led to any improvement. Unfortunately, Ms. Loo developed a \"severe rash\" while on the metronidazole. This resolved shortly after the medication was discontinued.       **Today  Saw RD at [facility], doubled-multivitamin and started folate.  Wondering about testing for micronutrients.       Took rifaximin. Bloating improved right away, felt stool improved towards end of course (less floaty, less greasy, more formed, brown color).  OVerall feels much better. Has had intermittent bloating since then - much shorter lastly.  Stools, typically 3 daily (right away in morning, after breakfast, and after exercise). No stress incontinence - not having leakage. Still on Amitiza and colace. Did stop nightly senna, still on a.m. Senna.     Feeling heaviness, like things shift into low abd/low pelvis. This happens at end of day. Wonders if full of stool. Doesn't really have rectal symptoms, may infrequently have rectal fullness. Sometimes passes flatus and this improves rectal pressure. Hasn't felt need to use suppositories. Feels low gas diet does really help with bloating.       Has incisional hernia - planned for repair with Dr. Phoenix        6. GERD  7. Dysphagia  Taken/summarized from my prior documentation:    Her GERD is experienced as substernal and throat burning as well as bringing up of nocturnal refluxant (would awaken choking) and refluxant with bending over. This was worked up by several GI providers and, most recently, she was seen by Dr. George Flores here at [facility]. She reports a " prior Schatzki's ring which required dilation in the past. Esophageal manometry done here was normal; pH impedence revealed a DeMeester of 24 with positive symptom association.  Recommendations at her most recent esophageal clinic visit in Jan 2016 were for BID PPI with consideration of BID H2 blocker if still symptomatic. A trial of carafate did not improve her GERD. Ms. Loo states that pursuit of Linx procedure was discussed with Dr. George Flores. She has done well with daily lansoprazole and lifestyle/behavioural changes and thus has not pursued it yet.a.      Today, doesn't feel like had symptoms since TPIAT. Wonders if needs to continue lifelong.       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  Document previously scanned on 1/12/17.  Validation form completed and scanned.  Code Status reflects choices in most recent ACP document..  Confirmed/documented designated decision maker(s).  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 Provider to review and confirm.       GERD (gastroesophageal reflux disease) 12/01/2010     Priority:  Medium     Lumbago 04/18/2005     Priority: Medium     History of L5-S1 degenerative disk disease.         Sprain and strain of other specified sites of hip and thigh 12/09/2002     Priority: Medium     Chondromalacia of patella 12/09/2002     Priority: Medium     Need for prophylactic immunotherapy      Priority: Medium     trees, grass, srw, dust mites, cat       Allergic rhinitis due to other allergen 12/21/2001     Priority: Medium     Hypothyroidism      Priority: Medium     Problem list name updated by automated process. Provider to review       Sensorineural hearing loss 01/10/2005     Priority: Medium     Problem list name updated by automated process. Provider to review       Vertiginous syndrome and labyrinthine disorder 01/10/2005     Priority: Medium     Problem list name updated by automated process. Provider to review         PERTINENT MEDICATIONS:  Current Outpatient Prescriptions   Medication     acetaminophen 500 MG CAPS     amitriptyline (ELAVIL) 10 MG tablet     amitriptyline (ELAVIL) 50 MG tablet     amylase-lipase-protease (CREON 24) 35318-19803 UNITS CPEP per EC capsule     aspirin 81 MG EC tablet     azelastine (ASTELIN) 0.1 % spray     blood glucose monitoring (PAT CONTOUR NEXT) test strip     blood glucose monitoring (PAT MICROLET) lancets     celecoxib (CELEBREX) 100 MG capsule     cetirizine (ZYRTEC) 10 MG tablet     diphenhydrAMINE (BENADRYL ALLERGY) 25 MG capsule     docusate sodium (COLACE) 100 MG capsule     estradiol (VAGIFEM) 10 MCG TABS vaginal tablet     FOLIC ACID PO     glucose 40 % GEL gel     insulin aspart (NOVOLOG PENFILL) 100 UNIT/ML injection     insulin pen needle (BD KEN U/F) 32G X 4 MM     LANsoprazole (PREVACID) 30 MG CR capsule     levothyroxine (SYNTHROID) 137 MCG tablet     Lubiprostone (AMITIZA) 8 MCG CAPS capsule     multivitamin CF formula (CHOICEFUL) capsule     order for DME     prochlorperazine (COMPAZINE) 5 MG tablet     Sharps Container (BD SHARPS  ") MISC     ZOLMitriptan (ZOMIG) 5 MG tablet     No current facility-administered medications for this visit.          PHYSICAL EXAMINATION:  Vitals /74  Pulse 106  Temp 98.5  F (36.9  C) (Oral)  Ht 1.727 m (5' 8\")  Wt 68.2 kg (150 lb 4.8 oz)  SpO2 100%  BMI 22.85 kg/m2   Wt   Wt Readings from Last 2 Encounters:   06/06/18 68.2 kg (150 lb 4.8 oz)   05/15/18 68.5 kg (151 lb)   Gen: Pt sitting up in NAD, interactive and cooperative on exam  Eyes: sclerae anicteric, no injection  ENT:  OP clear, MMM  Cardiac: RRR, nl S1, S2  Resp: Clear on anterior exam  GI: Normoactive BS, abd soft, flat, nontender  Skin: Warm, dry, no jaundice  Neuro: alert, oriented, answers questions appropriately      PERTINENT STUDIES:    Orders Only on 03/23/2018   Component Date Value Ref Range Status     WBC 03/23/2018 4.1  4.0 - 11.0 10e9/L Final     RBC Count 03/23/2018 4.46  3.8 - 5.2 10e12/L Final     Hemoglobin 03/23/2018 13.9  11.7 - 15.7 g/dL Final     Hematocrit 03/23/2018 41.7  35.0 - 47.0 % Final     MCV 03/23/2018 94  78 - 100 fl Final     MCH 03/23/2018 31.2  26.5 - 33.0 pg Final     MCHC 03/23/2018 33.3  31.5 - 36.5 g/dL Final     RDW 03/23/2018 13.8  10.0 - 15.0 % Final     Platelet Count 03/23/2018 382  150 - 450 10e9/L Final     Diff Method 03/23/2018 Automated Method   Final     % Neutrophils 03/23/2018 35.9  % Final     % Lymphocytes 03/23/2018 41.8  % Final     % Monocytes 03/23/2018 14.8  % Final     % Eosinophils 03/23/2018 4.6  % Final     % Basophils 03/23/2018 2.9  % Final     % Immature Granulocytes 03/23/2018 0.0  % Final     Nucleated RBCs 03/23/2018 0  0 /100 Final     Absolute Neutrophil 03/23/2018 1.5* 1.6 - 8.3 10e9/L Final     Absolute Lymphocytes 03/23/2018 1.7  0.8 - 5.3 10e9/L Final     Absolute Monocytes 03/23/2018 0.6  0.0 - 1.3 10e9/L Final     Absolute Eosinophils 03/23/2018 0.2  0.0 - 0.7 10e9/L Final     Absolute Basophils 03/23/2018 0.1  0.0 - 0.2 10e9/L Final     Abs Immature " Granulocytes 03/23/2018 0.0  0 - 0.4 10e9/L Final     Absolute Nucleated RBC 03/23/2018 0.0   Final     Sodium 03/23/2018 138  133 - 144 mmol/L Final     Potassium 03/23/2018 4.1  3.4 - 5.3 mmol/L Final     Chloride 03/23/2018 104  94 - 109 mmol/L Final     Carbon Dioxide 03/23/2018 29  20 - 32 mmol/L Final     Anion Gap 03/23/2018 6  3 - 14 mmol/L Final     Glucose 03/23/2018 101* 70 - 99 mg/dL Final     Urea Nitrogen 03/23/2018 12  7 - 30 mg/dL Final     Creatinine 03/23/2018 0.68  0.52 - 1.04 mg/dL Final     GFR Estimate 03/23/2018 >90  >60 mL/min/1.7m2 Final     GFR Estimate If Black 03/23/2018 >90  >60 mL/min/1.7m2 Final     Calcium 03/23/2018 9.0  8.5 - 10.1 mg/dL Final     Bilirubin Direct 03/23/2018 0.2  0.0 - 0.2 mg/dL Final     Bilirubin Total 03/23/2018 0.8  0.2 - 1.3 mg/dL Final     Albumin 03/23/2018 3.9  3.4 - 5.0 g/dL Final     Protein Total 03/23/2018 7.7  6.8 - 8.8 g/dL Final     Alkaline Phosphatase 03/23/2018 160* 40 - 150 U/L Final     ALT 03/23/2018 64* 0 - 50 U/L Final     AST 03/23/2018 44  0 - 45 U/L Final     IGG 03/23/2018 1200  695 - 1620 mg/dL Final     IgG1 03/23/2018 408  300 - 856 mg/dL Final     IgG2 03/23/2018 397  158 - 761 mg/dL Final     IgG3 03/23/2018 92  24 - 192 mg/dL Final     IgG4 03/23/2018 80  11 - 86 mg/dL Final     TSH 03/23/2018 0.74  0.40 - 4.00 mU/L Final     T4 Free 03/23/2018 1.13  0.76 - 1.46 ng/dL Final         Answers for HPI/ROS submitted by the patient on 6/4/2018   General Symptoms: Yes  Skin Symptoms: No  HENT Symptoms: Yes  EYE SYMPTOMS: Yes  HEART SYMPTOMS: No  LUNG SYMPTOMS: No  INTESTINAL SYMPTOMS: Yes  URINARY SYMPTOMS: No  GYNECOLOGIC SYMPTOMS: No  BREAST SYMPTOMS: No  SKELETAL SYMPTOMS: No  BLOOD SYMPTOMS: No  NERVOUS SYSTEM SYMPTOMS: Yes  MENTAL HEALTH SYMPTOMS: Yes  Fever: No  Loss of appetite: No  Weight loss: No  Weight gain: Yes  Fatigue: Yes  Night sweats: Yes  Chills: No  Increased stress: Yes  Excessive hunger: No  Excessive thirst: No  Feeling  hot or cold when others believe the temperature is normal: Yes  Loss of height: No  Post-operative complications: No  Surgical site pain: No  Hallucinations: No  Change in or Loss of Energy: No  Hyperactivity: No  Confusion: No  Ear pain: Yes  Ear discharge: Yes  Hearing loss: No  Tinnitus: Yes  Nosebleeds: No  Congestion: Yes  Sinus pain: Yes  Trouble swallowing: No   Voice hoarseness: No  Mouth sores: No  Sore throat: No  Tooth pain: No  Gum tenderness: No  Bleeding gums: No  Change in taste: No  Change in sense of smell: No  Dry mouth: Yes  Hearing aid used: No  Neck lump: No  Eye pain: Yes  Vision loss: No  Dry eyes: Yes  Watery eyes: No  Eye bulging: No  Double vision: No  Flashing of lights: Yes  Spots: No  Floaters: No  Redness: Yes  Crossed eyes: No  Tunnel Vision: No  Yellowing of eyes: No  Eye irritation: Yes  Heart burn or indigestion: No  Nausea: Yes  Vomiting: Yes  Abdominal pain: Yes  Bloating: Yes  Constipation: No  Diarrhea: Yes  Blood in stool: No  Black stools: No  Rectal or Anal pain: No  Fecal incontinence: No  Yellowing of skin or eyes: No  Vomit with blood: No  Change in stools: Yes  Trouble with coordination: No  Dizziness or trouble with balance: No  Fainting or black-out spells: No  Memory loss: No  Headache: Yes  Seizures: No  Speech problems: No  Tingling: Yes  Tremor: No  Weakness: No  Difficulty walking: No  Paralysis: No  Numbness: No  Nervous or Anxious: Yes  Depression: No  Trouble sleeping: Yes  Trouble thinking or concentrating: Yes  Mood changes: No  Panic attacks: No

## 2018-06-06 NOTE — PATIENT INSTRUCTIONS
1. Continue on the low gas diet  2. Continue lifestyle changes to limit aerophagia (no carbonated beverages, no straws, no gum, no hard candy). Eat slowly.   3. Continue your efforts for daily exercise, goal of walking at least 3 miles a day. Continue your efforts to work on improving your core strength.   4. Continue your Amitiza 3 tabs at night and 2 tabs in the morning.   5. Continue daily senna and colace.   6 Continue bisacodyl as needed if three days without a BM.   7. Continue your efforts to have 5-6 small meals/snacks a day.   8. Limit high fiber and high fat loads to your stomach.   9. Continue your pancreatic enzymes.  10. Ok for a trial off of lansoprazole. Ok to stop this and start ranitidine 150 mg daily. If having increased heartburn immediately after the switch, ok for Tums during that time.   11. Update GI clinic in 2-3 weeks on your reflux.   12. Follow-up in GI clinic in 3-4 months with Dr. Genao or ANA Rojas.     GI clinic fax- [phone]    [facility]- [phone]

## 2018-06-15 ENCOUNTER — OFFICE VISIT (OUTPATIENT)
Dept: PSYCHOLOGY | Facility: CLINIC | Age: 53
End: 2018-06-15
Payer: COMMERCIAL

## 2018-06-15 DIAGNOSIS — F43.89 STRESS-RELATED PHYSIOLOGICAL RESPONSE AFFECTING PHYSICAL CONDITION: Primary | ICD-10-CM

## 2018-06-15 NOTE — MR AVS SNAPSHOT
After Visit Summary   6/15/2018    Dinora Mcghee    MRN: [id number]           Patient Information     Date Of Birth          1965        Visit Information        Provider Department      6/15/2018 4:00 PM Maria Elena Abdalla, PhD [facility]        Today's Diagnoses     Stress-related physiological response affecting physical condition    -  1       Follow-ups after your visit        Your next 10 appointments already scheduled     Aug 23, 2018   Procedure with Nestor Phoenix MD   [facility] [place], Same Day Surgery (--)    [address]  [facility] [address]   [phone]            Sep 05, 2018  4:20 PM CDT   (Arrive by 4:05 PM)   Return Visit with Vijaya Genao MD   [facility] ([facility])    [address] [place] [address]  4th Floor  [facility] [id number] [phone]            Sep 14, 2018 11:30 AM CDT   (Arrive by 11:15 AM)   Return Visit with Anita Becerra MD   [facility] ([facility])    [address] [place] [address]  [address]  [facility] [id number] [phone]              Who to contact     Please call your clinic at [phone] to:    Ask questions about your health    Make or cancel appointments    Discuss your medicines    Learn about your test results    Speak to your doctor            Additional Information About Your Visit        [employer] Information     [employer] gives you secure access to your electronic health record. If you see a primary care provider, you can also send messages to your care team and make appointments. If you have questions, please call your primary care clinic.  If you do not have a primary care provider, please call [phone] and they will assist you.      [employer] is an electronic gateway that provides easy, online access to your medical records. With [employer], you can request a clinic appointment, read your test results, renew a  prescription or communicate with your care team.     To access your existing account, please contact your [facility] or call [phone] for assistance.        Care EveryWhere ID     This is your Care EveryWhere ID. This could be used by other organizations to access your [facility] medical records  [id number]         Blood Pressure from Last 3 Encounters:   06/06/18 107/74   05/15/18 114/76   03/23/18 114/70    Weight from Last 3 Encounters:   06/06/18 68.2 kg (150 lb 4.8 oz)   05/15/18 68.5 kg (151 lb)   03/23/18 67.2 kg (148 lb 3.2 oz)              Today, you had the following     No orders found for display       Primary Care Provider Office Phone # Fax #    Justyna Lawson [phone]       [facility] [address]  [place] [address]        Equal Access to Services     [facility]: Hadii aad ku hadasho Soomaali, waaxda luqadaha, qaybta kaalmada adeegyada, waxay idiin hayaan adeeg kharaletty la'candyn . So [facility] [phone].    ATENCIÓN: Si habla español, tiene a muñoz disposición servicios gratuitos de asistencia lingüística. [facility] [phone].    We comply with applicable federal civil rights laws and [place] laws. We do not discriminate on the basis of race, color, national origin, age, disability, sex, sexual orientation, or gender identity.            Thank you!     Thank you for choosing [facility]  for your care. Our goal is always to provide you with excellent care. Hearing back from our patients is one way we can continue to improve our services. Please take a few minutes to complete the written survey that you may receive in the mail after your visit with us. Thank you!             Your Updated Medication List - Protect others around you: Learn how to safely use, store and throw away your medicines at www.disposemymeds.org.          This list is accurate as of 6/15/18 11:59 PM.  Always use your most recent med list.                    Brand Name Dispense Instructions for use Diagnosis    acetaminophen 500 MG Caps      Take 2 mg by mouth 2 times daily        * amitriptyline 50 MG tablet    ELAVIL    60 tablet    Take 1 tablet (50 mg) by mouth At Bedtime    Headache disorder       * amitriptyline 10 MG tablet    ELAVIL    60 tablet    Take 1 tablet (10 mg) by mouth At Bedtime    Headache disorder       amylase-lipase-protease 35596-04880 units Cpep per EC capsule    CREON 24    450 capsule    Take 3-4 capsules by mouth with meals and 1-2 with snacks. Maximum 15 capsules per day.    Acquired total absence of pancreas       aspirin 81 MG EC tablet     90 tablet    Take 1 tablet (81 mg) by mouth daily    High platelet count (H)       azelastine 0.1 % spray    ASTELIN     Spray 1 spray into both nostrils 2 times daily        BD SHARPS  Misc     1 each    1 Container as needed    Post-pancreatectomy diabetes (H)       blood glucose monitoring lancets     1 Box    Use to test blood sugar 6-8 times daily or as directed.    Acute on chronic pancreatitis (H)       blood glucose monitoring test strip    PAT CONTOUR NEXT    2 Box    Use to test blood sugar 6 times daily or as directed.    Post-pancreatectomy diabetes (H)       celecoxib 100 MG capsule    celeBREX    60 capsule    Take 1 capsule (100 mg) by mouth 2 times daily    Chronic abdominal pain       cetirizine 10 MG tablet    zyrTEC     Take 10 mg by mouth daily    Elevated liver enzymes       diphenhydrAMINE 25 MG capsule    BENADRYL ALLERGY    56 capsule    Take 1 capsule (25 mg) by mouth every 6 hours as needed for itching or allergies    Itching, Post-pancreatectomy diabetes (H), History of pancreatectomy, Pancreatic insufficiency       docusate sodium 100 MG capsule    COLACE    60 capsule    Take 1 capsule (100 mg) by mouth 2 times daily as needed for constipation,    Itching, Post-pancreatectomy diabetes (H), History of pancreatectomy, Pancreatic insufficiency       FOLIC  ACID PO      Take 1 mg by mouth daily        glucose 40 % Gel gel     3 Tube    Take 15-30 g by mouth every 15 minutes as needed for low blood sugar    Acquired total absence of pancreas       insulin aspart 100 UNIT/ML injection    NovoLOG PENFILL    3 mL    Administer subcutaneously 0.5 unit per 45 grams of carbohydrates.    Post-pancreatectomy diabetes (H)       insulin pen needle 32G X 4 MM    BD KEN U/F    100 each    Use 6-8 times per day    Acquired total absence of pancreas       LANsoprazole 30 MG CR capsule    PREVACID    30 capsule    Take 1 capsule (30 mg) by mouth daily Take 30-60 minutes before a meal.    Post-pancreatectomy diabetes (H), Pancreatic insufficiency       Lubiprostone 8 MCG Caps capsule    AMITIZA    150 capsule    Take 5 capsules (40 mcg) by mouth daily Take 3 capsules in AM and 2 capsules in PM    Other constipation       multivitamin CF formula capsule    CHOICEFUL     Take 1 tablet by mouth daily    Itching, Post-pancreatectomy diabetes (H), History of pancreatectomy, Pancreatic insufficiency       order for DME     1 Device    Equipment being ordered:Cefaly    Headache disorder       prochlorperazine 5 MG tablet    COMPAZINE    90 tablet    Take two 5 mg tablets by mouth every six hours as needed for nausea.    Itching, Post-pancreatectomy diabetes (H), History of pancreatectomy, Pancreatic insufficiency       ranitidine 150 MG tablet    ZANTAC    90 tablet    Take 1 tablet (150 mg) by mouth At Bedtime    Gastroesophageal reflux disease without esophagitis       SYNTHROID 137 MCG tablet   Generic drug:  levothyroxine      Take 137 mcg by mouth daily        VAGIFEM 10 MCG Tabs vaginal tablet   Generic drug:  estradiol           ZOMIG 5 MG tablet   Generic drug:  ZOLMitriptan      Take 5 mg by mouth at onset of headache for migraine        * Notice:  This list has 2 medication(s) that are the same as other medications prescribed for you. Read the directions carefully, and ask your  doctor or other care provider to review them with you.

## 2018-06-15 NOTE — PROGRESS NOTES
[facility]   Medical    Psychology Progress Note    Patient Name: Dinora Mcghee    YOB: 1965   Medical Record Number: [id number]  Date: 6/15/2018              SUBJECTIVE            Interval history:  Having recurrence of the pancreas flares she had prior to surgery,  These are waking her up in middle of the night, with severe  chest pain radiating around her R rib cage and through to her back.   Disc with GI provider last week who agreed likely phantom pain process   also has residual migraine right now    OBJECTIVE:              Length of Visit: 60 minutes        Mental status: Marked Distress and Tearful         Session objective:  Pain mangement         Behavioral inventions and response:                            CBT   On review of trauma and phantom pain.  The event that she is relieving is one of the inpatients events before the feeding tube.            Breathing Imagery   Along with                Resourced Brainspotting/Eye position therapy [employer]  is trauma of pancreas pain                                     Resource spot     Body grid   .          Beginning  SUDS =   3  But went up to 9 as she recalled the pain episodes and remembered the trauma              Ending SUDS  =  1      Was able to recall the trauma with out distress                                                              ASSESSMENT              Diagnoses:                                                                 F45.42   Pain condition with medical and psychological factors                                                                          Psychosocial and Contextual Factors: stayed the same         Progress toward goals: satisfactory.              Pain status since onset of services: had fluctuating course           Emotional status since onset of services: had fluctuating course       Medication / chemical use concerns: None        PLAN:               Next Appointment: Dinora Mcghee will schedule a  "follow-up appointment in 2 weeks.         Assignment: Establish a daily routine of stretching and exercise, Healthy breathing skills  for daily use, Utilize  Guided imagery  \"EASE  PAIN\"  at least once a day and auditory EMDR         Objectives / interventions for next session:          Improve pain management skills: Goals include:  Resource Brainspotting/Eye Position therapy               Maria Elena Abdalla, Ph.D., L.P. ...............6/15/2018 4:04 PM                Medical Psychologist                       "

## 2018-06-29 ENCOUNTER — OFFICE VISIT (OUTPATIENT)
Dept: PSYCHOLOGY | Facility: CLINIC | Age: 53
End: 2018-06-29
Payer: COMMERCIAL

## 2018-06-29 DIAGNOSIS — F43.89 STRESS-RELATED PHYSIOLOGICAL RESPONSE AFFECTING PHYSICAL CONDITION: Primary | ICD-10-CM

## 2018-06-29 NOTE — MR AVS SNAPSHOT
After Visit Summary   6/29/2018    Dinora Mcghee    MRN: [id number]           Patient Information     Date Of Birth          1965        Visit Information        Provider Department      6/29/2018 3:00 PM Maria Elena Abdalla, PhD [facility]        Today's Diagnoses     Stress-related physiological response affecting physical condition    -  1       Follow-ups after your visit        Follow-up notes from your care team     Return in about 1 month (around 7/29/2018).      Your next 10 appointments already scheduled     Aug 23, 2018   Procedure with Nestor Phoenix MD   [facility] [place], Same Day Surgery (--)    [address] [facility] [address]   [phone]            Sep 05, 2018  4:20 PM CDT   (Arrive by 4:05 PM)   Return Visit with Vijaya Genao MD   [facility] ([facility])    [address]  4th Floor  [facility] [place] [facility] [address] [phone]            Sep 14, 2018 11:30 AM CDT   (Arrive by 11:15 AM)   Return Visit with Anita Becerra MD   [facility] ([facility])    [address] [facility] [address]  [address]  [facility] [place] [facility] [address] [phone]              Who to contact     Please call your clinic at [phone] to:    Ask questions about your health    Make or cancel appointments    Discuss your medicines    Learn about your test results    Speak to your doctor            Additional Information About Your Visit        MyChart Information     [employer] gives you secure access to your electronic health record. If you see a primary care provider, you can also send messages to your care team and make appointments. If you have questions, please call your primary care clinic.  If you do not have a primary care provider, please call [phone] and they will assist you.      [employer] is an electronic gateway that provides easy, online access to your medical records.  With [employer], you can request a clinic appointment, read your test results, renew a prescription or communicate with your care team.     To access your existing account, please contact your [facility] or call [phone] for assistance.        Care EveryWhere ID     This is your Care EveryWhere ID. This could be used by other organizations to access your [facility] medical records  [id number]         Blood Pressure from Last 3 Encounters:   06/06/18 107/74   05/15/18 114/76   03/23/18 114/70    Weight from Last 3 Encounters:   06/06/18 68.2 kg (150 lb 4.8 oz)   05/15/18 68.5 kg (151 lb)   03/23/18 67.2 kg (148 lb 3.2 oz)              Today, you had the following     No orders found for display       Primary Care Provider Office Phone # Fax #    Justyna Lawson [phone]       [facility] [address]  [place] [address]        Equal Access to Services     [facility] : Hadii aad ku hadasho Soomaali, waaxda luqadaha, qaybta kaalmada adeegyada, waxay idiin hayaan adeeg kharaletty la'bharati . So [facility] [phone].    ATENCIÓN: Si habla español, tiene a muñoz disposición servicios gratuitos de asistencia lingüística. [facility] [phone].    We comply with applicable federal civil rights laws and [place] laws. We do not discriminate on the basis of race, color, national origin, age, disability, sex, sexual orientation, or gender identity.            Thank you!     Thank you for choosing [facility]  for your care. Our goal is always to provide you with excellent care. Hearing back from our patients is one way we can continue to improve our services. Please take a few minutes to complete the written survey that you may receive in the mail after your visit with us. Thank you!             Your Updated Medication List - Protect others around you: Learn how to safely use, store and throw away your medicines at www.disposemymeds.org.          This  list is accurate as of 6/29/18 11:59 PM.  Always use your most recent med list.                   Brand Name Dispense Instructions for use Diagnosis    acetaminophen 500 MG Caps      Take 2 mg by mouth 2 times daily        * amitriptyline 50 MG tablet    ELAVIL    60 tablet    Take 1 tablet (50 mg) by mouth At Bedtime    Headache disorder       * amitriptyline 10 MG tablet    ELAVIL    60 tablet    Take 1 tablet (10 mg) by mouth At Bedtime    Headache disorder       amylase-lipase-protease 97272-04767 units Cpep per EC capsule    CREON 24    450 capsule    Take 3-4 capsules by mouth with meals and 1-2 with snacks. Maximum 15 capsules per day.    Acquired total absence of pancreas       aspirin 81 MG EC tablet     90 tablet    Take 1 tablet (81 mg) by mouth daily    High platelet count (H)       azelastine 0.1 % spray    ASTELIN     Spray 1 spray into both nostrils 2 times daily        BD SHARPS  Misc     1 each    1 Container as needed    Post-pancreatectomy diabetes (H)       blood glucose monitoring lancets     1 Box    Use to test blood sugar 6-8 times daily or as directed.    Acute on chronic pancreatitis (H)       blood glucose monitoring test strip    PAT CONTOUR NEXT    2 Box    Use to test blood sugar 6 times daily or as directed.    Post-pancreatectomy diabetes (H)       celecoxib 100 MG capsule    celeBREX    60 capsule    Take 1 capsule (100 mg) by mouth 2 times daily    Chronic abdominal pain       cetirizine 10 MG tablet    zyrTEC     Take 10 mg by mouth daily    Elevated liver enzymes       diphenhydrAMINE 25 MG capsule    BENADRYL ALLERGY    56 capsule    Take 1 capsule (25 mg) by mouth every 6 hours as needed for itching or allergies    Itching, Post-pancreatectomy diabetes (H), History of pancreatectomy, Pancreatic insufficiency       docusate sodium 100 MG capsule    COLACE    60 capsule    Take 1 capsule (100 mg) by mouth 2 times daily as needed for constipation,    Itching,  Post-pancreatectomy diabetes (H), History of pancreatectomy, Pancreatic insufficiency       FOLIC ACID PO      Take 1 mg by mouth daily        glucose 40 % (400 mg/mL) Gel gel     3 Tube    Take 15-30 g by mouth every 15 minutes as needed for low blood sugar    Acquired total absence of pancreas       insulin aspart 100 UNIT/ML injection    NovoLOG PENFILL    3 mL    Administer subcutaneously 0.5 unit per 45 grams of carbohydrates.    Post-pancreatectomy diabetes (H)       insulin pen needle 32G X 4 MM    BD KEN U/F    100 each    Use 6-8 times per day    Acquired total absence of pancreas       LANsoprazole 30 MG CR capsule    PREVACID    30 capsule    Take 1 capsule (30 mg) by mouth daily Take 30-60 minutes before a meal.    Post-pancreatectomy diabetes (H), Pancreatic insufficiency       Lubiprostone 8 MCG Caps capsule    AMITIZA    150 capsule    Take 5 capsules (40 mcg) by mouth daily Take 3 capsules in AM and 2 capsules in PM    Other constipation       multivitamin CF formula capsule    CHOICEFUL     Take 1 tablet by mouth daily    Itching, Post-pancreatectomy diabetes (H), History of pancreatectomy, Pancreatic insufficiency       order for DME     1 Device    Equipment being ordered:Cefaly    Headache disorder       prochlorperazine 5 MG tablet    COMPAZINE    90 tablet    Take two 5 mg tablets by mouth every six hours as needed for nausea.    Itching, Post-pancreatectomy diabetes (H), History of pancreatectomy, Pancreatic insufficiency       ranitidine 150 MG tablet    ZANTAC    90 tablet    Take 1 tablet (150 mg) by mouth At Bedtime    Gastroesophageal reflux disease without esophagitis       SYNTHROID 137 MCG tablet   Generic drug:  levothyroxine      Take 137 mcg by mouth daily        VAGIFEM 10 MCG Tabs vaginal tablet   Generic drug:  estradiol           ZOMIG 5 MG tablet   Generic drug:  ZOLMitriptan      Take 5 mg by mouth at onset of headache for migraine        * Notice:  This list has 2  medication(s) that are the same as other medications prescribed for you. Read the directions carefully, and ask your doctor or other care provider to review them with you.

## 2018-06-29 NOTE — PROGRESS NOTES
[facility]   Medical    Psychology Progress Note    Patient Name: Dinora Mcghee    YOB: 1965   Medical Record Number: [id number]  Date: 6/29/2018              SUBJECTIVE            Interval history:  Yesterday had best day in 5 years.   Nighttime panic/pain has stopped and  Also no abd daytime pain.  Migraines  still  a problem.  But overall feeling better      OBJECTIVE:              Length of Visit: 45 minutes        Mental status: No Distress - Normal Mental Status          Session objective:  Pain management         Behavioral inventions and response:                         CBT   On use of pacing and tools to help reduce migraines   And self talk        And coping with changes in her treatment team     Dr. Phoenix leaving                                               ASSESSMENT              Diagnoses:                                                             F45.42   Pain condition with medical and psychological factors     PLAN:               Next Appointment: Dinora Mcghee will schedule a follow-up appointment in 4 weeks.         Assignment: Establish a daily routine of stretching and exercise, Healthy breathing skills  for daily use and Learn to pace and moderate physical activity         Objectives / interventions for next session:          Improve pain management skills: Goals include:  Learn to pace and moderate physical activity, Learn constructive cognitive response to pain triggers and Cognitive therapy: create constructive thought processes and beliefs regarding pain               Maria Elena Abdalla, Ph.D., L.P. ...............6/29/2018 3:01 PM                Medical Psychologist

## 2018-07-02 ENCOUNTER — TELEPHONE (OUTPATIENT)
Dept: TRANSPLANT | Facility: CLINIC | Age: 53
End: 2018-07-02

## 2018-07-02 NOTE — TELEPHONE ENCOUNTER
I called [facility] to let them know that I will be moving to the [facility] at the end of August. The patient thanked me for personally letting them know.    She is going to need a referral for a new PCP at the , and most likely for PAC for preop. Her Incisional hernia repair will be Aug 23rd. She knows that Yinka will be her transition surgeon if any needs, and remembers her fondly from tpiat postop care.

## 2018-07-11 DIAGNOSIS — K59.09 OTHER CONSTIPATION: ICD-10-CM

## 2018-07-12 RX ORDER — LUBIPROSTONE 8 UG/1
5 CAPSULE ORAL DAILY
Qty: 150 CAPSULE | Refills: 3 | Status: SHIPPED | OUTPATIENT
Start: 2018-07-12 | End: 2018-11-12

## 2018-07-12 NOTE — TELEPHONE ENCOUNTER
Lubiprostone (AMITIZA) 8 MCG CAPS capsule   Last Written Prescription Date:  2/28/18  Last Fill Quantity: 150,   # refills: 3  Last Office Visit : 6/6/18  Future Office visit:  9/5/18    Routed because: dosing alert

## 2018-07-31 ENCOUNTER — OFFICE VISIT (OUTPATIENT)
Dept: INTERNAL MEDICINE | Facility: CLINIC | Age: 53
End: 2018-07-31
Payer: COMMERCIAL

## 2018-07-31 VITALS
DIASTOLIC BLOOD PRESSURE: 72 MMHG | WEIGHT: 150.6 LBS | HEIGHT: 68 IN | HEART RATE: 83 BPM | SYSTOLIC BLOOD PRESSURE: 106 MMHG | BODY MASS INDEX: 22.82 KG/M2 | OXYGEN SATURATION: 98 %

## 2018-07-31 DIAGNOSIS — E23.2 DIABETES INSIPIDUS (H): ICD-10-CM

## 2018-07-31 DIAGNOSIS — Z23 NEED FOR TDAP VACCINATION: ICD-10-CM

## 2018-07-31 DIAGNOSIS — Z12.31 ENCOUNTER FOR SCREENING MAMMOGRAM FOR BREAST CANCER: ICD-10-CM

## 2018-07-31 DIAGNOSIS — M75.00 ADHESIVE CAPSULITIS OF SHOULDER, UNSPECIFIED LATERALITY: ICD-10-CM

## 2018-07-31 DIAGNOSIS — Z01.810 PRE-OPERATIVE CARDIOVASCULAR EXAMINATION: Primary | ICD-10-CM

## 2018-07-31 DIAGNOSIS — Z01.810 PRE-OPERATIVE CARDIOVASCULAR EXAMINATION: ICD-10-CM

## 2018-07-31 LAB
ALBUMIN SERPL-MCNC: 3.7 G/DL (ref 3.4–5)
ALBUMIN UR-MCNC: NEGATIVE MG/DL
ALP SERPL-CCNC: 170 U/L (ref 40–150)
ALT SERPL W P-5'-P-CCNC: 77 U/L (ref 0–50)
ANION GAP SERPL CALCULATED.3IONS-SCNC: 5 MMOL/L (ref 3–14)
APPEARANCE UR: CLEAR
AST SERPL W P-5'-P-CCNC: 69 U/L (ref 0–45)
BACTERIA #/AREA URNS HPF: ABNORMAL /HPF
BILIRUB SERPL-MCNC: 0.7 MG/DL (ref 0.2–1.3)
BILIRUB UR QL STRIP: NEGATIVE
BUN SERPL-MCNC: 14 MG/DL (ref 7–30)
CALCIUM SERPL-MCNC: 9 MG/DL (ref 8.5–10.1)
CHLORIDE SERPL-SCNC: 105 MMOL/L (ref 94–109)
CO2 SERPL-SCNC: 28 MMOL/L (ref 20–32)
COLOR UR AUTO: ABNORMAL
CREAT SERPL-MCNC: 0.68 MG/DL (ref 0.52–1.04)
ERYTHROCYTE [DISTWIDTH] IN BLOOD BY AUTOMATED COUNT: 13.9 % (ref 10–15)
GFR SERPL CREATININE-BSD FRML MDRD: >90 ML/MIN/1.7M2
GLUCOSE SERPL-MCNC: 91 MG/DL (ref 70–99)
GLUCOSE UR STRIP-MCNC: NEGATIVE MG/DL
HCT VFR BLD AUTO: 41.7 % (ref 35–47)
HGB BLD-MCNC: 13.7 G/DL (ref 11.7–15.7)
HGB UR QL STRIP: NEGATIVE
INTERPRETATION ECG - MUSE: NORMAL
KETONES UR STRIP-MCNC: NEGATIVE MG/DL
LEUKOCYTE ESTERASE UR QL STRIP: NEGATIVE
MCH RBC QN AUTO: 30.9 PG (ref 26.5–33)
MCHC RBC AUTO-ENTMCNC: 32.9 G/DL (ref 31.5–36.5)
MCV RBC AUTO: 94 FL (ref 78–100)
MUCOUS THREADS #/AREA URNS LPF: PRESENT /LPF
NITRATE UR QL: NEGATIVE
PH UR STRIP: 7 PH (ref 5–7)
PLATELET # BLD AUTO: 377 10E9/L (ref 150–450)
POTASSIUM SERPL-SCNC: 3.9 MMOL/L (ref 3.4–5.3)
PROT SERPL-MCNC: 7.6 G/DL (ref 6.8–8.8)
RBC # BLD AUTO: 4.43 10E12/L (ref 3.8–5.2)
RBC #/AREA URNS AUTO: <1 /HPF (ref 0–2)
SODIUM SERPL-SCNC: 138 MMOL/L (ref 133–144)
SOURCE: ABNORMAL
SP GR UR STRIP: 1 (ref 1–1.03)
UROBILINOGEN UR STRIP-MCNC: 0 MG/DL (ref 0–2)
WBC # BLD AUTO: 5.6 10E9/L (ref 4–11)
WBC #/AREA URNS AUTO: <1 /HPF (ref 0–5)

## 2018-07-31 ASSESSMENT — ENCOUNTER SYMPTOMS
SINUS CONGESTION: 1
MUSCLE WEAKNESS: 0
SINUS PAIN: 1
SPEECH CHANGE: 0
RECTAL PAIN: 1
BLOOD IN STOOL: 1
BREAST PAIN: 0
JOINT SWELLING: 1
EYE REDNESS: 0
TASTE DISTURBANCE: 0
BRUISES/BLEEDS EASILY: 1
HEARTBURN: 1
WEIGHT GAIN: 0
EYE WATERING: 0
TREMORS: 0
CONSTIPATION: 1
MEMORY LOSS: 0
CHILLS: 0
DECREASED CONCENTRATION: 1
NECK MASS: 0
STIFFNESS: 1
HEADACHES: 1
ALTERED TEMPERATURE REGULATION: 1
PANIC: 0
BACK PAIN: 0
WEAKNESS: 0
FEVER: 0
SWOLLEN GLANDS: 0
NERVOUS/ANXIOUS: 1
MYALGIAS: 1
INCREASED ENERGY: 0
FATIGUE: 1
TINGLING: 1
NECK PAIN: 1
SEIZURES: 0
POLYDIPSIA: 0
DOUBLE VISION: 0
NUMBNESS: 0
EYE IRRITATION: 1
JAUNDICE: 0
ARTHRALGIAS: 1
POLYPHAGIA: 1
SMELL DISTURBANCE: 0
INSOMNIA: 1
HALLUCINATIONS: 0
BLOATING: 1
BOWEL INCONTINENCE: 0
HOARSE VOICE: 1
VOMITING: 0
DIARRHEA: 1
SORE THROAT: 0
TROUBLE SWALLOWING: 0
NAUSEA: 1
EYE PAIN: 0
PARALYSIS: 0
MUSCLE CRAMPS: 1
NIGHT SWEATS: 1
LOSS OF CONSCIOUSNESS: 0
DISTURBANCES IN COORDINATION: 0
WEIGHT LOSS: 0
BREAST MASS: 0
DIZZINESS: 0
DECREASED APPETITE: 0
DEPRESSION: 0
ABDOMINAL PAIN: 1

## 2018-07-31 ASSESSMENT — PAIN SCALES - GENERAL: PAINLEVEL: NO PAIN (0)

## 2018-07-31 NOTE — PATIENT INSTRUCTIONS
Please go to the lab on the first floor before you leave today.     Breast Center ([facility]) [phone] ([facility] 2nd Floor)  Breast Center ([facility]) [phone] ([address])     Primary Care Center [phone] ([facility], 4th Floor N)       --Approval given to proceed with proposed procedure, without further diagnostic evaluation    ASA class 2 - Mild systemic disease  No evidence of functionally compromising cardiac or pulmonary status  Clear for anesthesia and surgery  The patient is recommended to hold aspirin or NSAIDS for 10 days prior to surgery.  The patient is instructed as to which medications to take with sips of water the morning of surgery    Pre-Op Plan:   Proceed with surgery as planned.  No food for 8 hours before surgery.  No liquid for 2 hours before surgery.   Call surgeon  If you develop a fever, respiratory illness or other symptoms.  Hold the following medications the morning of  Surgery:  All meds    Take the following medications the morning of surgery with a sip of water: none      Letter sent to requesting surgeon  listed above  Written pre-operative instructions given.    Laboratory studies:  Dinora was seen today for pre-op exam.    Diagnoses and all orders for this visit:    Pre-operative cardiovascular examination  -     EKG Performed in Clinic w/ Provider Reading Fee  -     CBC with platelets; Future  -     Comprehensive metabolic panel; Future  -     UA with Micro reflex to Culture; Future    Need for Tdap vaccination  -     TDAP VACCINE (BOOSTRIX)    Encounter for screening mammogram for breast cancer  -     Mammogram, routine screening; Future          Cardiovascular: EKG was indicated based on risk assessment.  Pt over age 50.     Total time spent 25 minutes.  More than 50% of the time spent with Ms. Mcghee on counseling / coordinating her care      Please contact our office if there are any further questions or information required about this  patient.

## 2018-07-31 NOTE — PROGRESS NOTES
"[facility]   Latasha Paul, APRN CNP  07/31/2018     Chief Complaint:   Pre-Op Exam     History of Present Illness:   Dinora Mcghee is 53 year old female here at the request of Dr. Nestor Phoenix for cardiovascular, pulmonary, and perioperative risk assessment prior to surgery.  The intended surgical procedure is a laparoscopic herniorrhaphy incisional.  A copy of this note will be sent to the surgeon.     Reason for surgery:   The patient originally noticed a \"small, squishy\" area on her abdomen when she had to get up to go to the bathroom. It has become more hard and uncomfortable over time, and it bulges when she sneezes and when she is up on her feet for prolonged time.The area is reducible. She has had no vomiting or nausea with this, and it is not painful. Per surgeon's note, her pain was rated at a 0/10.    Other concerns discussed:  1) New PCP - She would like to establish care here; she was a patient of the [facility], and was unhappy with her care there, as they have not understood her surgeries or medical history well, she states. For example, she is no longer taking insulin and they \"cannot seem to wrap their heads around this.\" She has not had to use insulin since January, and checks her sugars daily.     2) Migraines - She has had nine migraines this month. Her Amitriptyline was increased, but it has not seemed to work for her. She has had migraines for 25 years.     3) Exercise and Diet - She is on the FODMAP diet, and walks and lifts weights a lot for exercise. She walks to work, for shopping, to get to [place], etc. This is easy for her, considering where she lives.     Cardiovascular Risk:  This patient does not have chest pain with ambulation or walking up a flight of stairs.  There is not any chest pain with exercise.  She does have a history of smoker and high cholesterol.  There is a history of stroke or valvular disease.    Pulmonary Risk:  The patient does not " have any history of lung problems.    Perioperative Complications:  The patient does not have a history of bleeding or clotting problems in the past, specifically has no history of DVT, PE, or bleeding disorder.  They are not currently anticoagulated.  She has not had complications from past surgeries.  The patient does have a family history of any anesthesia or surgical complications; her father had a stroke secondary to anesthesia. She has a history of post-operative nausea and vomiting, but wears a scopolamine patch prior to surgery now, and this is no longer a problem.    Review of Systems:   Pertinent items are noted in HPI and below, remainder of complete ROS is negative.    Answers for HPI/ROS submitted by the patient on 7/31/2018   General Symptoms: Yes  Skin Symptoms: No  HENT Symptoms: Yes  EYE SYMPTOMS: Yes  HEART SYMPTOMS: No  LUNG SYMPTOMS: No  INTESTINAL SYMPTOMS: Yes  URINARY SYMPTOMS: No  GYNECOLOGIC SYMPTOMS: No  BREAST SYMPTOMS: Yes  SKELETAL SYMPTOMS: Yes  BLOOD SYMPTOMS: Yes  NERVOUS SYSTEM SYMPTOMS: Yes  MENTAL HEALTH SYMPTOMS: Yes  Fever: No  Loss of appetite: No  Weight loss: No  Weight gain: No  Fatigue: Yes  Night sweats: Yes  Chills: No  Increased stress: Yes  Excessive hunger: Yes  Excessive thirst: No  Feeling hot or cold when others believe the temperature is normal: Yes  Loss of height: No  Post-operative complications: Yes  Surgical site pain: Yes  Hallucinations: No  Change in or Loss of Energy: No  Hyperactivity: No  Confusion: No  Ear pain: Yes  Ear discharge: No  Hearing loss: No  Tinnitus: Yes  Nosebleeds: No  Congestion: Yes  Sinus pain: Yes  Trouble swallowing: No   Voice hoarseness: Yes  Mouth sores: No  Sore throat: No  Tooth pain: Yes  Gum tenderness: No  Bleeding gums: No  Change in taste: No  Change in sense of smell: No  Dry mouth: Yes  Hearing aid used: No  Neck lump: No  Eye pain: No  Vision loss: No  Dry eyes: Yes  Watery eyes: No  Eye bulging: No  Double vision:  No  Flashing of lights: Yes  Spots: No  Floaters: No  Redness: No  Crossed eyes: No  Tunnel Vision: No  Yellowing of eyes: No  Eye irritation: Yes  Heart burn or indigestion: Yes  Nausea: Yes  Vomiting: No  Abdominal pain: Yes  Bloating: Yes  Constipation: Yes  Diarrhea: Yes  Blood in stool: Yes  Black stools: No  Rectal or Anal pain: Yes  Fecal incontinence: No  Yellowing of skin or eyes: No  Vomit with blood: No  Change in stools: No  Back pain: No  Muscle aches: Yes  Neck pain: Yes  Swollen joints: Yes  Joint pain: Yes  Bone pain: No  Muscle cramps: Yes  Muscle weakness: No  Joint stiffness: Yes  Bone fracture: No  Anemia: No  Swollen glands: No  Easy bleeding or bruising: Yes  Edema or swelling: Yes  Trouble with coordination: No  Dizziness or trouble with balance: No  Fainting or black-out spells: No  Memory loss: No  Headache: Yes  Seizures: No  Speech problems: No  Tingling: Yes  Tremor: No  Weakness: No  Difficulty walking: No  Paralysis: No  Numbness: No  Discharge: No  Lumps: No  Pain: No  Nipple retraction: No  Nervous or Anxious: Yes  Depression: No  Trouble sleeping: Yes  Trouble thinking or concentrating: Yes  Mood changes: No  Panic attacks: No    Current Outpatient Prescriptions   Medication     acetaminophen 500 MG CAPS     amitriptyline (ELAVIL) 10 MG tablet     amitriptyline (ELAVIL) 50 MG tablet     amylase-lipase-protease (CREON 24) 16116-77488 UNITS CPEP per EC capsule     aspirin 81 MG EC tablet     azelastine (ASTELIN) 0.1 % spray     blood glucose monitoring (PAT CONTOUR NEXT) test strip     blood glucose monitoring (PAT MICROLET) lancets     cetirizine (ZYRTEC) 10 MG tablet     diphenhydrAMINE (BENADRYL ALLERGY) 25 MG capsule     docusate sodium (COLACE) 100 MG capsule     estradiol (VAGIFEM) 10 MCG TABS vaginal tablet     FOLIC ACID PO     glucose 40 % GEL gel     levothyroxine (SYNTHROID) 137 MCG tablet     Lubiprostone (AMITIZA) 8 MCG CAPS capsule     multivitamin CF formula  (CHOICEFUL) capsule     order for DME     prochlorperazine (COMPAZINE) 5 MG tablet     ranitidine (ZANTAC) 150 MG tablet     Sharps Container (BD SHARPS ) MISC     ZOLMitriptan (ZOMIG) 5 MG tablet     No current facility-administered medications for this visit.         Allergies:   Apple; Depakote [divalproex sodium]; Zithromax [azithromycin dihydrate]; Darvocet [propoxyphene n-apap]; Morphine; Nalbuphine hcl; Zosyn [piperacillin-tazobactam in d5w]; Bactrim [sulfamethoxazole w-trimethoprim]; Cats; Compazine [prochlorperazine]; Corticosteroids; Dust mites; Grass; Prednisone; Ragweeds; Tape [adhesive tape]; Trees; Zofran [ondansetron]; and Flagyl [metronidazole]      Past Medical History:  Allergic rhinitis  Allergy to other foods  Arthritis  Choledocholithiasis  History of chronic abdominal pain  Chronic constipation  History of chronic nausea   History of chronic pancreatitis  Degeneration of lumbar or lumbosacral intervertebral disc  Esophageal reflux   Hiatal hernia  History of pituitary adenoma  Hypothyroidism  Migraines  Hyperlipidemia   History of being on tube feeding diet; no longer  Pancreatic disease  Post-operative nausea and vomiting   Portacath in place  Hearing loss  Need for prophylactic immunotherapy  Chondromalacia of patella  Vertiginous syndrome and labyrinthine disorder  Lumbago  Gastroesophageal reflux disease  History of ERCP  Post-pancreatectomy diabetes   Gastroparesis  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      Past Surgical History:  Total abdominal hysterectomy  Appendectomy   Laparoscopic cholecystectomy   Excise pituitary, transnasal septal   delivery only x2  UGI endoscopy Diag with Biospy  UGI Endoscopy w EUS  Endoscopic retrograde cholangiopancreatogram x5  EGD Combined x4  Esoph/gas reflux test with nasal imped >1 hr  Inject transversus abdominis plane TAP block bilateral   Endoscopic ultrasound upper gastrointestinal tract   Abdomen surgery   Colonoscopy  Transphenoidal  "pituitary resection   section  Laparoscopic pancreatectomy, transplant auto islet cell  Excise lesion trunk     Family History:   Eye disorder - Father   Myocardial infarction  - Father   Lipids - Father, Mother   Cerebrovascular disease - Father   Depression - Father, Sister, Son, Nephew   Substance abuse - Father   Anesthesia reaction, stroke - Father   Hypertension - Mother   Thyroid disease - Mother, Maternal grandmother, Sister, Nephew   Eye disorder - Son, Paternal grandmother, Paternal grandfather, Maternal grandmother   Diabetes - Paternal grandfather, Maternal grandfather   Coronary artery disease  - Sister  Anxiety disorder - Nephew       Social History:   The patient was alone.  Smoking Status: Former, 0.5 PPD for 6 years   Smokeless Tobacco: Never   Alcohol Use: No       Physical Exam:   /72  Pulse 83  Ht 1.727 m (5' 8\")  Wt 68.3 kg (150 lb 9.6 oz)  SpO2 98%  BMI 22.9 kg/m2     Wt Readings from Last 1 Encounters:   18 68.3 kg (150 lb 9.6 oz)     Constitutional: no distress, comfortable, pleasant   Eyes: anicteric, conjunctiva pink, normal extra-ocular movements   Ears, Nose and Throat: tympanic membranes clear, nose clear and free of lesions, throat clear.   Neck: supple with full range of motion, no thyromegaly.   Cardiovascular: regular rate and rhythm, normal S1 and S2, no murmurs, rubs or gallops, peripheral pulses full and symmetric   Respiratory: clear to auscultation, no wheezes or crackles, normal breath sounds   Gastrointestinal: positive bowel sounds, nontender, no hepatosplenomegaly, no masses   Musculoskeletal: full range of motion, no edema.  Hernia not palpable.   Skin: no concerning lesions, no jaundice, temp normal   Neurological: normal gait, normal speech, no tremor. A and O x 3, good historian.  Psychological: appropriate mood, good eye contact, normal affect   Lymph: no  cervical,  supraclavicular, infraclavicular  nodes.     Immunizations:  Immunization " History   Administered Date(s) Administered     Hib (PRP-T) 12/01/2016     Influenza (IIV3) PF 09/29/2011, 11/15/2014, 10/21/2016     Influenza (intradermal) 11/01/2003, 09/29/2011     Influenza Vaccine IM 3yrs+ 4 Valent IIV4 10/24/2015, 10/21/2016, 10/20/2017     Influenza Vaccine, 3 YRS +, IM (QUADRIVALENT W/PRESERVATIVES) 11/04/2013     Meningococcal (Bexsero ) 12/09/2016     Meningococcal (Menactra ) 12/01/2016     Pneumococcal 23 valent 12/01/2016     TD (ADULT, 7+) 07/09/1998     TDAP Vaccine (Adacel) 01/07/2008     Future Labs:  - CBC with platelets  - Comprehensive metabolic panel  - UA with Micro reflex to Culture    EKG:  EKG was indicated based on risk assessment. NSR    Pregnancy test needed: No     Assessment and Plan:  Pre-operative cardiovascular examination  - EKG Performed in Clinic w/ Provider Reading Fee  - CBC with platelets  - Comprehensive metabolic panel  - UA with Micro reflex to Culture    Need for Tdap vaccination  She is due for a tetanus, so she will receive this today in clinic.   - TDAP VACCINE (BOOSTRIX)    Encounter for screening mammogram for breast cancer  Dinora is due for a mammogram, so this will be ordered for her today.   - Mammogram, routine screening  Results for DINORA DAVIS (MRN [id number]) as of 8/2/2018 14:42   Ref. Range 7/31/2018 15:09 7/31/2018 15:52 7/31/2018 15:53   Sodium Latest Ref Range: 133 - 144 mmol/L  138    Potassium Latest Ref Range: 3.4 - 5.3 mmol/L  3.9    Chloride Latest Ref Range: 94 - 109 mmol/L  105    Carbon Dioxide Latest Ref Range: 20 - 32 mmol/L  28    Urea Nitrogen Latest Ref Range: 7 - 30 mg/dL  14    Creatinine Latest Ref Range: 0.52 - 1.04 mg/dL  0.68    GFR Estimate Latest Ref Range: >60 mL/min/1.7m2  >90    GFR Estimate If Black Latest Ref Range: >60 mL/min/1.7m2  >90    Calcium Latest Ref Range: 8.5 - 10.1 mg/dL  9.0    Anion Gap Latest Ref Range: 3 - 14 mmol/L  5    Albumin Latest Ref Range: 3.4 - 5.0 g/dL  3.7    Protein Total  Latest Ref Range: 6.8 - 8.8 g/dL  7.6    Bilirubin Total Latest Ref Range: 0.2 - 1.3 mg/dL  0.7    Alkaline Phosphatase Latest Ref Range: 40 - 150 U/L  170 (H)    ALT Latest Ref Range: 0 - 50 U/L  77 (H)    AST Latest Ref Range: 0 - 45 U/L  69 (H)    Glucose Latest Ref Range: 70 - 99 mg/dL  91    WBC Latest Ref Range: 4.0 - 11.0 10e9/L  5.6    Hemoglobin Latest Ref Range: 11.7 - 15.7 g/dL  13.7    Hematocrit Latest Ref Range: 35.0 - 47.0 %  41.7    Platelet Count Latest Ref Range: 150 - 450 10e9/L  377    RBC Count Latest Ref Range: 3.8 - 5.2 10e12/L  4.43    MCV Latest Ref Range: 78 - 100 fl  94    MCH Latest Ref Range: 26.5 - 33.0 pg  30.9    MCHC Latest Ref Range: 31.5 - 36.5 g/dL  32.9    RDW Latest Ref Range: 10.0 - 15.0 %  13.9    Color Urine Unknown   Straw   Appearance Urine Unknown   Clear   Glucose Urine Latest Ref Range: NEG^Negative mg/dL   Negative   Bilirubin Urine Latest Ref Range: NEG^Negative    Negative   Ketones Urine Latest Ref Range: NEG^Negative mg/dL   Negative   Specific Gravity Urine Latest Ref Range: 1.003 - 1.035    1.003   pH Urine Latest Ref Range: 5.0 - 7.0 pH   7.0   Protein Albumin Urine Latest Ref Range: NEG^Negative mg/dL   Negative   Urobilinogen mg/dL Latest Ref Range: 0.0 - 2.0 mg/dL   0.0   Nitrite Urine Latest Ref Range: NEG^Negative    Negative   Blood Urine Latest Ref Range: NEG^Negative    Negative   Leukocyte Esterase Urine Latest Ref Range: NEG^Negative    Negative   Source Unknown   Midstream Urine   WBC Urine Latest Ref Range: 0 - 5 /HPF   <1   RBC Urine Latest Ref Range: 0 - 2 /HPF   <1   Bacteria Urine Latest Ref Range: NEG^Negative /HPF   Few (A)   Mucous Urine Latest Ref Range: NEG^Negative /LPF   Present (A)   EKG 12-LEAD CLINIC READ Unknown Rpt         1. Pre-operative assessment for a laparoscopic herniorrhaphy   The patient is at LOW risk for cardiovascular complications and at LOW risk for pulmonary complications of this LOW risk surgery.    --Approval given to  proceed with proposed procedure, without further diagnostic evaluation    The patient has been told to not take any aspirin or NSAIDs for 10 days prior to surgery. The patient has been instructed as to what to do with medications prior to surgery.        Scribe Disclosure:   I, Yovana Hunt, am serving as a scribe to document services personally performed by NATALY Somers CNP at this visit, based upon the provider's statements to me. All documentation has been reviewed by the aforementioned provider prior to being entered into the official medical record.     Portions of this medical record were completed by a scribe. UPON MY REVIEW AND AUTHENTICATION BY ELECTRONIC SIGNATURE, this confirms (a) I performed the applicable clinical services, and (b) the record is accurate.      Total time spent 25 minutes.  More than 50% of the time spent with Ms. Mcghee on counseling / coordinating her care.    Latasha INGRAM, SHANIQUE

## 2018-07-31 NOTE — LETTER
"7/31/2018      RE: Dinora Mcghee  [address] [facility] [address]       [facility]  Primary Care Center   Latasha Paul, NATALY BAY  07/31/2018     Chief Complaint:   Pre-Op Exam     History of Present Illness:   Dinora Mcghee is 53 year old female here at the request of Dr. Nestor Phoenix for cardiovascular, pulmonary, and perioperative risk assessment prior to surgery.  The intended surgical procedure is a laparoscopic herniorrhaphy incisional.  A copy of this note will be sent to the surgeon.     Reason for surgery:   The patient originally noticed a \"small, squishy\" area on her abdomen when she had to get up to go to the bathroom. It has become more hard and uncomfortable over time, and it bulges when she sneezes and when she is up on her feet for prolonged time.The area is reducible. She has had no vomiting or nausea with this, and it is not painful. Per surgeon's note, her pain was rated at a 0/10.    Other concerns discussed:  1) New PCP - She would like to establish care here; she was a patient of the [facility], and was unhappy with her care there, as they have not understood her surgeries or medical history well, she states. For example, she is no longer taking insulin and they \"cannot seem to wrap their heads around this.\" She has not had to use insulin since January, and checks her sugars daily.     2) Migraines - She has had nine migraines this month. Her Amitriptyline was increased, but it has not seemed to work for her. She has had migraines for 25 years.     3) Exercise and Diet - She is on the FODMAP diet, and walks and lifts weights a lot for exercise. She walks to work, for shopping, to get to [employer], etc. This is easy for her, considering where she lives.     Cardiovascular Risk:  This patient does not have chest pain with ambulation or walking up a flight of stairs.  There is not any chest pain with exercise.  She does have a history of smoker and high cholesterol.  There is " a history of stroke or valvular disease.    Pulmonary Risk:  The patient does not have any history of lung problems.    Perioperative Complications:  The patient does not have a history of bleeding or clotting problems in the past, specifically has no history of DVT, PE, or bleeding disorder.  They are not currently anticoagulated.  She has not had complications from past surgeries.  The patient does have a family history of any anesthesia or surgical complications; her father had a stroke secondary to anesthesia. She has a history of post-operative nausea and vomiting, but wears a scopolamine patch prior to surgery now, and this is no longer a problem.    Review of Systems:   Pertinent items are noted in HPI and below, remainder of complete ROS is negative.    Answers for HPI/ROS submitted by the patient on 7/31/2018   General Symptoms: Yes  Skin Symptoms: No  HENT Symptoms: Yes  EYE SYMPTOMS: Yes  HEART SYMPTOMS: No  LUNG SYMPTOMS: No  INTESTINAL SYMPTOMS: Yes  URINARY SYMPTOMS: No  GYNECOLOGIC SYMPTOMS: No  BREAST SYMPTOMS: Yes  SKELETAL SYMPTOMS: Yes  BLOOD SYMPTOMS: Yes  NERVOUS SYSTEM SYMPTOMS: Yes  MENTAL HEALTH SYMPTOMS: Yes  Fever: No  Loss of appetite: No  Weight loss: No  Weight gain: No  Fatigue: Yes  Night sweats: Yes  Chills: No  Increased stress: Yes  Excessive hunger: Yes  Excessive thirst: No  Feeling hot or cold when others believe the temperature is normal: Yes  Loss of height: No  Post-operative complications: Yes  Surgical site pain: Yes  Hallucinations: No  Change in or Loss of Energy: No  Hyperactivity: No  Confusion: No  Ear pain: Yes  Ear discharge: No  Hearing loss: No  Tinnitus: Yes  Nosebleeds: No  Congestion: Yes  Sinus pain: Yes  Trouble swallowing: No   Voice hoarseness: Yes  Mouth sores: No  Sore throat: No  Tooth pain: Yes  Gum tenderness: No  Bleeding gums: No  Change in taste: No  Change in sense of smell: No  Dry mouth: Yes  Hearing aid used: No  Neck lump: No  Eye pain:  No  Vision loss: No  Dry eyes: Yes  Watery eyes: No  Eye bulging: No  Double vision: No  Flashing of lights: Yes  Spots: No  Floaters: No  Redness: No  Crossed eyes: No  Tunnel Vision: No  Yellowing of eyes: No  Eye irritation: Yes  Heart burn or indigestion: Yes  Nausea: Yes  Vomiting: No  Abdominal pain: Yes  Bloating: Yes  Constipation: Yes  Diarrhea: Yes  Blood in stool: Yes  Black stools: No  Rectal or Anal pain: Yes  Fecal incontinence: No  Yellowing of skin or eyes: No  Vomit with blood: No  Change in stools: No  Back pain: No  Muscle aches: Yes  Neck pain: Yes  Swollen joints: Yes  Joint pain: Yes  Bone pain: No  Muscle cramps: Yes  Muscle weakness: No  Joint stiffness: Yes  Bone fracture: No  Anemia: No  Swollen glands: No  Easy bleeding or bruising: Yes  Edema or swelling: Yes  Trouble with coordination: No  Dizziness or trouble with balance: No  Fainting or black-out spells: No  Memory loss: No  Headache: Yes  Seizures: No  Speech problems: No  Tingling: Yes  Tremor: No  Weakness: No  Difficulty walking: No  Paralysis: No  Numbness: No  Discharge: No  Lumps: No  Pain: No  Nipple retraction: No  Nervous or Anxious: Yes  Depression: No  Trouble sleeping: Yes  Trouble thinking or concentrating: Yes  Mood changes: No  Panic attacks: No    Current Outpatient Prescriptions   Medication     acetaminophen 500 MG CAPS     amitriptyline (ELAVIL) 10 MG tablet     amitriptyline (ELAVIL) 50 MG tablet     amylase-lipase-protease (CREON 24) 19081-96813 UNITS CPEP per EC capsule     aspirin 81 MG EC tablet     azelastine (ASTELIN) 0.1 % spray     blood glucose monitoring (PAT CONTOUR NEXT) test strip     blood glucose monitoring (PAT MICROLET) lancets     cetirizine (ZYRTEC) 10 MG tablet     diphenhydrAMINE (BENADRYL ALLERGY) 25 MG capsule     docusate sodium (COLACE) 100 MG capsule     estradiol (VAGIFEM) 10 MCG TABS vaginal tablet     FOLIC ACID PO     glucose 40 % GEL gel     levothyroxine (SYNTHROID) 137 MCG  tablet     Lubiprostone (AMITIZA) 8 MCG CAPS capsule     multivitamin CF formula (CHOICEFUL) capsule     order for DME     prochlorperazine (COMPAZINE) 5 MG tablet     ranitidine (ZANTAC) 150 MG tablet     Sharps Container (BD SHARPS ) MISC     ZOLMitriptan (ZOMIG) 5 MG tablet     No current facility-administered medications for this visit.         Allergies:   Apple; Depakote [divalproex sodium]; Zithromax [azithromycin dihydrate]; Darvocet [propoxyphene n-apap]; Morphine; Nalbuphine hcl; Zosyn [piperacillin-tazobactam in d5w]; Bactrim [sulfamethoxazole w-trimethoprim]; Cats; Compazine [prochlorperazine]; Corticosteroids; Dust mites; Grass; Prednisone; Ragweeds; Tape [adhesive tape]; Trees; Zofran [ondansetron]; and Flagyl [metronidazole]      Past Medical History:  Allergic rhinitis  Allergy to other foods  Arthritis  Choledocholithiasis  History of chronic abdominal pain  Chronic constipation  History of chronic nausea   History of chronic pancreatitis  Degeneration of lumbar or lumbosacral intervertebral disc  Esophageal reflux   Hiatal hernia  History of pituitary adenoma  Hypothyroidism  Migraines  Hyperlipidemia   History of being on tube feeding diet; no longer  Pancreatic disease  Post-operative nausea and vomiting   Portacath in place  Hearing loss  Need for prophylactic immunotherapy  Chondromalacia of patella  Vertiginous syndrome and labyrinthine disorder  Lumbago  Gastroesophageal reflux disease  History of ERCP  Post-pancreatectomy diabetes   Gastroparesis  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      Past Surgical History:  Total abdominal hysterectomy  Appendectomy   Laparoscopic cholecystectomy   Excise pituitary, transnasal septal   delivery only x2  UGI endoscopy Diag with Biospy  UGI Endoscopy w EUS  Endoscopic retrograde cholangiopancreatogram x5  EGD Combined x4  Esoph/gas reflux test with nasal imped >1 hr  Inject transversus abdominis plane TAP block bilateral   Endoscopic  "ultrasound upper gastrointestinal tract   Abdomen surgery   Colonoscopy  Transphenoidal pituitary resection   section  Laparoscopic pancreatectomy, transplant auto islet cell  Excise lesion trunk     Family History:   Eye disorder - Father   Myocardial infarction  - Father   Lipids - Father, Mother   Cerebrovascular disease - Father   Depression - Father, Sister, Son, Nephew   Substance abuse - Father   Anesthesia reaction, stroke - Father   Hypertension - Mother   Thyroid disease - Mother, Maternal grandmother, Sister, Nephew   Eye disorder - Son, Paternal grandmother, Paternal grandfather, Maternal grandmother   Diabetes - Paternal grandfather, Maternal grandfather   Coronary artery disease  - Sister  Anxiety disorder - Nephew       Social History:   The patient was alone.  Smoking Status: Former, 0.5 PPD for 6 years   Smokeless Tobacco: Never   Alcohol Use: No       Physical Exam:   /72  Pulse 83  Ht 1.727 m (5' 8\")  Wt 68.3 kg (150 lb 9.6 oz)  SpO2 98%  BMI 22.9 kg/m2     Wt Readings from Last 1 Encounters:   18 68.3 kg (150 lb 9.6 oz)     Constitutional: no distress, comfortable, pleasant   Eyes: anicteric, conjunctiva pink, normal extra-ocular movements   Ears, Nose and Throat: tympanic membranes clear, nose clear and free of lesions, throat clear.   Neck: supple with full range of motion, no thyromegaly.   Cardiovascular: regular rate and rhythm, normal S1 and S2, no murmurs, rubs or gallops, peripheral pulses full and symmetric   Respiratory: clear to auscultation, no wheezes or crackles, normal breath sounds   Gastrointestinal: positive bowel sounds, nontender, no hepatosplenomegaly, no masses   Musculoskeletal: full range of motion, no edema.  Hernia not palpable.   Skin: no concerning lesions, no jaundice, temp normal   Neurological: normal gait, normal speech, no tremor. A and O x 3, good historian.  Psychological: appropriate mood, good eye contact, normal affect   Lymph: no  " cervical,  supraclavicular, infraclavicular  nodes.     Immunizations:  Immunization History   Administered Date(s) Administered     Hib (PRP-T) 12/01/2016     Influenza (IIV3) PF 09/29/2011, 11/15/2014, 10/21/2016     Influenza (intradermal) 11/01/2003, 09/29/2011     Influenza Vaccine IM 3yrs+ 4 Valent IIV4 10/24/2015, 10/21/2016, 10/20/2017     Influenza Vaccine, 3 YRS +, IM (QUADRIVALENT W/PRESERVATIVES) 11/04/2013     Meningococcal (Bexsero ) 12/09/2016     Meningococcal (Menactra ) 12/01/2016     Pneumococcal 23 valent 12/01/2016     TD (ADULT, 7+) 07/09/1998     TDAP Vaccine (Adacel) 01/07/2008     Future Labs:  - CBC with platelets  - Comprehensive metabolic panel  - UA with Micro reflex to Culture    EKG:  EKG was indicated based on risk assessment. NSR    Pregnancy test needed: No     Assessment and Plan:  Pre-operative cardiovascular examination  - EKG Performed in Clinic w/ Provider Reading Fee  - CBC with platelets  - Comprehensive metabolic panel  - UA with Micro reflex to Culture    Need for Tdap vaccination  She is due for a tetanus, so she will receive this today in clinic.   - TDAP VACCINE (BOOSTRIX)    Encounter for screening mammogram for breast cancer  Dinora is due for a mammogram, so this will be ordered for her today.   - Mammogram, routine screening  Results for DINORA DAVIS (MRN [id number]) as of 8/2/2018 14:42   Ref. Range 7/31/2018 15:09 7/31/2018 15:52 7/31/2018 15:53   Sodium Latest Ref Range: 133 - 144 mmol/L  138    Potassium Latest Ref Range: 3.4 - 5.3 mmol/L  3.9    Chloride Latest Ref Range: 94 - 109 mmol/L  105    Carbon Dioxide Latest Ref Range: 20 - 32 mmol/L  28    Urea Nitrogen Latest Ref Range: 7 - 30 mg/dL  14    Creatinine Latest Ref Range: 0.52 - 1.04 mg/dL  0.68    GFR Estimate Latest Ref Range: >60 mL/min/1.7m2  >90    GFR Estimate If Black Latest Ref Range: >60 mL/min/1.7m2  >90    Calcium Latest Ref Range: 8.5 - 10.1 mg/dL  9.0    Anion Gap Latest Ref Range: 3  - 14 mmol/L  5    Albumin Latest Ref Range: 3.4 - 5.0 g/dL  3.7    Protein Total Latest Ref Range: 6.8 - 8.8 g/dL  7.6    Bilirubin Total Latest Ref Range: 0.2 - 1.3 mg/dL  0.7    Alkaline Phosphatase Latest Ref Range: 40 - 150 U/L  170 (H)    ALT Latest Ref Range: 0 - 50 U/L  77 (H)    AST Latest Ref Range: 0 - 45 U/L  69 (H)    Glucose Latest Ref Range: 70 - 99 mg/dL  91    WBC Latest Ref Range: 4.0 - 11.0 10e9/L  5.6    Hemoglobin Latest Ref Range: 11.7 - 15.7 g/dL  13.7    Hematocrit Latest Ref Range: 35.0 - 47.0 %  41.7    Platelet Count Latest Ref Range: 150 - 450 10e9/L  377    RBC Count Latest Ref Range: 3.8 - 5.2 10e12/L  4.43    MCV Latest Ref Range: 78 - 100 fl  94    MCH Latest Ref Range: 26.5 - 33.0 pg  30.9    MCHC Latest Ref Range: 31.5 - 36.5 g/dL  32.9    RDW Latest Ref Range: 10.0 - 15.0 %  13.9    Color Urine Unknown   Straw   Appearance Urine Unknown   Clear   Glucose Urine Latest Ref Range: NEG^Negative mg/dL   Negative   Bilirubin Urine Latest Ref Range: NEG^Negative    Negative   Ketones Urine Latest Ref Range: NEG^Negative mg/dL   Negative   Specific Gravity Urine Latest Ref Range: 1.003 - 1.035    1.003   pH Urine Latest Ref Range: 5.0 - 7.0 pH   7.0   Protein Albumin Urine Latest Ref Range: NEG^Negative mg/dL   Negative   Urobilinogen mg/dL Latest Ref Range: 0.0 - 2.0 mg/dL   0.0   Nitrite Urine Latest Ref Range: NEG^Negative    Negative   Blood Urine Latest Ref Range: NEG^Negative    Negative   Leukocyte Esterase Urine Latest Ref Range: NEG^Negative    Negative   Source Unknown   Midstream Urine   WBC Urine Latest Ref Range: 0 - 5 /HPF   <1   RBC Urine Latest Ref Range: 0 - 2 /HPF   <1   Bacteria Urine Latest Ref Range: NEG^Negative /HPF   Few (A)   Mucous Urine Latest Ref Range: NEG^Negative /LPF   Present (A)   EKG 12-LEAD CLINIC READ Unknown Rpt         1. Pre-operative assessment for a laparoscopic herniorrhaphy   The patient is at LOW risk for cardiovascular complications and at LOW  risk for pulmonary complications of this LOW risk surgery.    --Approval given to proceed with proposed procedure, without further diagnostic evaluation    The patient has been told to not take any aspirin or NSAIDs for 10 days prior to surgery. The patient has been instructed as to what to do with medications prior to surgery.        Scribe Disclosure:   I, Yovana Hunt, am serving as a scribe to document services personally performed by NATALY Somers CNP at this visit, based upon the provider's statements to me. All documentation has been reviewed by the aforementioned provider prior to being entered into the official medical record.     Portions of this medical record were completed by a scribe. UPON MY REVIEW AND AUTHENTICATION BY ELECTRONIC SIGNATURE, this confirms (a) I performed the applicable clinical services, and (b) the record is accurate.      Total time spent 25 minutes.  More than 50% of the time spent with Ms. Mcghee on counseling / coordinating her care.    Latasha INGRAM, NATALY Rachel CNP

## 2018-07-31 NOTE — MR AVS SNAPSHOT
After Visit Summary   7/31/2018    Dinora Mcghee    MRN: [id number]           Patient Information     Date Of Birth          1965        Visit Information        Provider Department      7/31/2018 2:15 PM Latasha Paul, APRN CNP [facility]        Today's Diagnoses     Pre-operative cardiovascular examination    -  1    Need for Tdap vaccination        Encounter for screening mammogram for breast cancer          Care Instructions    Please go to the lab on the first floor before you leave today.     Breast Center ([facility]) [phone] ([facility] 2nd Floor)  Breast Center ([facility]) [phone] ([address])     Primary Care Center [phone] ([facility], 4th Floor N)       --Approval given to proceed with proposed procedure, without further diagnostic evaluation    ASA class 2 - Mild systemic disease  No evidence of functionally compromising cardiac or pulmonary status  Clear for anesthesia and surgery  The patient is recommended to hold aspirin or NSAIDS for 10 days prior to surgery.  The patient is instructed as to which medications to take with sips of water the morning of surgery    Pre-Op Plan:   Proceed with surgery as planned.  No food for 8 hours before surgery.  No liquid for 2 hours before surgery.   Call surgeon  If you develop a fever, respiratory illness or other symptoms.  Hold the following medications the morning of  Surgery:  All meds    Take the following medications the morning of surgery with a sip of water: none      Letter sent to requesting surgeon  listed above  Written pre-operative instructions given.    Laboratory studies:  Dinora was seen today for pre-op exam.    Diagnoses and all orders for this visit:    Pre-operative cardiovascular examination  -     EKG Performed in Clinic w/ Provider Reading Fee  -     CBC with platelets; Future  -     Comprehensive metabolic panel; Future  -     UA with Micro reflex to Culture;  Future    Need for Tdap vaccination  -     TDAP VACCINE (BOOSTRIX)    Encounter for screening mammogram for breast cancer  -     Mammogram, routine screening; Future          Cardiovascular: EKG was indicated based on risk assessment.  Pt over age 50.     Total time spent 25 minutes.  More than 50% of the time spent with Ms. Mcghee on counseling / coordinating her care      Please contact our office if there are any further questions or information required about this patient.            Follow-ups after your visit        Your next 10 appointments already scheduled     Aug 23, 2018   Procedure with Nestor Phoenix MD   [facility], [place], Same Day Surgery (--)    [address]  [facility] [address]   [phone]            Sep 05, 2018  4:20 PM CDT   (Arrive by 4:05 PM)   Return Visit with Vijaya Genao MD   [facility] ([facility])    [address]  4th Floor  [facility] [id number] [phone]            Sep 14, 2018 11:30 AM CDT   (Arrive by 11:15 AM)   Return Visit with Anita Becerra MD   [facility] Rheumatology ([facility])    [address]  [address]  [facility] [id number] [phone]              Future tests that were ordered for you today     Open Future Orders        Priority Expected Expires Ordered    Mammogram, routine screening Routine  7/31/2019 7/31/2018    CBC with platelets Routine 7/31/2018 8/14/2018 7/31/2018    Comprehensive metabolic panel Routine 7/31/2018 8/14/2018 7/31/2018    UA with Micro reflex to Culture Routine 7/31/2018 7/31/2019 7/31/2018            Who to contact     Please call your clinic at [phone] to:    Ask questions about your health    Make or cancel appointments    Discuss your medicines    Learn about your test results    Speak to your doctor            Additional Information About Your Visit        [employer] Information     [employer] gives you secure access  "to your electronic health record. If you see a primary care provider, you can also send messages to your care team and make appointments. If you have questions, please call your primary care clinic.  If you do not have a primary care provider, please call [phone] and they will assist you.      [employer] is an electronic gateway that provides easy, online access to your medical records. With [employer], you can request a clinic appointment, read your test results, renew a prescription or communicate with your care team.     To access your existing account, please contact your [facility] or call [phone] for assistance.        Care EveryWhere ID     This is your Care EveryWhere ID. This could be used by other organizations to access your [place] medical records  [id number]        Your Vitals Were     Pulse Height Pulse Oximetry BMI (Body Mass Index)          83 1.727 m (5' 8\") 98% 22.9 kg/m2         Blood Pressure from Last 3 Encounters:   07/31/18 106/72   06/06/18 107/74   05/15/18 114/76    Weight from Last 3 Encounters:   07/31/18 68.3 kg (150 lb 9.6 oz)   06/06/18 68.2 kg (150 lb 4.8 oz)   05/15/18 68.5 kg (151 lb)              We Performed the Following     EKG Performed in Clinic w/ Provider Reading Fee     TDAP VACCINE (BOOSTRIX)          Today's Medication Changes          These changes are accurate as of 7/31/18  3:24 PM.  If you have any questions, ask your nurse or doctor.               Stop taking these medicines if you haven't already. Please contact your care team if you have questions.     LANsoprazole 30 MG CR capsule   Commonly known as:  PREVACID   Stopped by:  Latasha Paul APRN CNP                    Primary Care Provider Office Phone # Fax #    Justyna Lawson [phone]       [facility] [address] [address]  [place] [facility] [address]        Equal Access to Services     ABHAY HUITRON AH: luci Jones, " ivan marina. So [facility] [phone].    ATENCIÓN: Si freda champagne, tiene a muñoz disposición servicios gratuitos de asistencia lingüística. [facility] al [phone].    We comply with applicable federal civil rights laws and [place] laws. We do not discriminate on the basis of race, color, national origin, age, disability, sex, sexual orientation, or gender identity.            Thank you!     Thank you for choosing [facility]  for your care. Our goal is always to provide you with excellent care. Hearing back from our patients is one way we can continue to improve our services. Please take a few minutes to complete the written survey that you may receive in the mail after your visit with us. Thank you!             Your Updated Medication List - Protect others around you: Learn how to safely use, store and throw away your medicines at www.disposemymeds.org.          This list is accurate as of 7/31/18  3:24 PM.  Always use your most recent med list.                   Brand Name Dispense Instructions for use Diagnosis    acetaminophen 500 MG Caps      Take 2 mg by mouth 2 times daily        * amitriptyline 50 MG tablet    ELAVIL    60 tablet    Take 1 tablet (50 mg) by mouth At Bedtime    Headache disorder       * amitriptyline 10 MG tablet    ELAVIL    60 tablet    Take 1 tablet (10 mg) by mouth At Bedtime    Headache disorder       amylase-lipase-protease 32061-27418 units Cpep per EC capsule    CREON 24    450 capsule    Take 3-4 capsules by mouth with meals and 1-2 with snacks. Maximum 15 capsules per day.    Acquired total absence of pancreas       aspirin 81 MG EC tablet     90 tablet    Take 1 tablet (81 mg) by mouth daily    High platelet count (H)       azelastine 0.1 % spray    ASTELIN     Spray 1 spray into both nostrils 2 times daily        BD SHARPS  Misc     1 each    1 Container as needed    Post-pancreatectomy diabetes (H)        blood glucose monitoring lancets     1 Box    Use to test blood sugar 6-8 times daily or as directed.    Acute on chronic pancreatitis (H)       blood glucose monitoring test strip    PAT CONTOUR NEXT    2 Box    Use to test blood sugar 6 times daily or as directed.    Post-pancreatectomy diabetes (H)       cetirizine 10 MG tablet    zyrTEC     Take 10 mg by mouth daily    Elevated liver enzymes       diphenhydrAMINE 25 MG capsule    BENADRYL ALLERGY    56 capsule    Take 1 capsule (25 mg) by mouth every 6 hours as needed for itching or allergies    Itching, Post-pancreatectomy diabetes (H), History of pancreatectomy, Pancreatic insufficiency       docusate sodium 100 MG capsule    COLACE    60 capsule    Take 1 capsule (100 mg) by mouth 2 times daily as needed for constipation,    Itching, Post-pancreatectomy diabetes (H), History of pancreatectomy, Pancreatic insufficiency       FOLIC ACID PO      Take 1 mg by mouth daily        glucose 40 % (400 mg/mL) Gel gel     3 Tube    Take 15-30 g by mouth every 15 minutes as needed for low blood sugar    Acquired total absence of pancreas       insulin aspart 100 UNIT/ML injection    NovoLOG PENFILL    3 mL    Administer subcutaneously 0.5 unit per 45 grams of carbohydrates.    Post-pancreatectomy diabetes (H)       insulin pen needle 32G X 4 MM    BD KEN U/F    100 each    Use 6-8 times per day    Acquired total absence of pancreas       Lubiprostone 8 MCG Caps capsule    AMITIZA    150 capsule    Take 5 capsules (40 mcg) by mouth daily Take 3 capsules in AM and 2 capsules in PM    Other constipation       multivitamin CF formula capsule    CHOICEFUL     Take 1 tablet by mouth daily    Itching, Post-pancreatectomy diabetes (H), History of pancreatectomy, Pancreatic insufficiency       order for DME     1 Device    Equipment being ordered:Cefaly    Headache disorder       prochlorperazine 5 MG tablet    COMPAZINE    90 tablet    Take two 5 mg tablets by mouth every  six hours as needed for nausea.    Itching, Post-pancreatectomy diabetes (H), History of pancreatectomy, Pancreatic insufficiency       ranitidine 150 MG tablet    ZANTAC    90 tablet    Take 1 tablet (150 mg) by mouth At Bedtime    Gastroesophageal reflux disease without esophagitis       SYNTHROID 137 MCG tablet   Generic drug:  levothyroxine      Take 137 mcg by mouth daily        VAGIFEM 10 MCG Tabs vaginal tablet   Generic drug:  estradiol           ZOMIG 5 MG tablet   Generic drug:  ZOLMitriptan      Take 5 mg by mouth at onset of headache for migraine        * Notice:  This list has 2 medication(s) that are the same as other medications prescribed for you. Read the directions carefully, and ask your doctor or other care provider to review them with you.

## 2018-07-31 NOTE — NURSING NOTE
Surgeon (please enter first and last name):  Dr Nestor Phoenix  Fax number for Preop Evaluation:  [facility]  Location of Surgery: [facility]  Date of Surgery:  8.23.18  Procedure:  Laparoscopic Incisional Hernia Repair, Anesthesia Block  History of reaction to anesthesia?  Yes, migraines

## 2018-08-02 PROBLEM — E23.2 DIABETES INSIPIDUS (H): Status: ACTIVE | Noted: 2018-08-02

## 2018-08-02 PROBLEM — M75.00 ADHESIVE CAPSULITIS OF SHOULDER, UNSPECIFIED LATERALITY: Status: ACTIVE | Noted: 2018-08-02

## 2018-08-17 ENCOUNTER — OFFICE VISIT (OUTPATIENT)
Dept: PSYCHOLOGY | Facility: CLINIC | Age: 53
End: 2018-08-17
Payer: COMMERCIAL

## 2018-08-17 ENCOUNTER — TELEPHONE (OUTPATIENT)
Dept: INTERNAL MEDICINE | Facility: CLINIC | Age: 53
End: 2018-08-17

## 2018-08-17 DIAGNOSIS — F43.22 ADJUSTMENT DISORDER WITH ANXIOUS MOOD: Primary | ICD-10-CM

## 2018-08-17 DIAGNOSIS — F43.89 STRESS-RELATED PHYSIOLOGICAL RESPONSE AFFECTING PHYSICAL CONDITION: Primary | ICD-10-CM

## 2018-08-17 NOTE — MR AVS SNAPSHOT
"              After Visit Summary   8/17/2018    Dinora Mcghee    MRN: [id number]           Patient Information     Date Of Birth          1965        Visit Information        Provider Department      8/17/2018 4:00 PM Maria Elena Abdalla, PhD [facility]        Today's Diagnoses     Stress-related physiological response affecting physical condition    -  1       Follow-ups after your visit        Follow-up notes from your care team     Return in about 3 weeks (around 9/7/2018).      Your next 10 appointments already scheduled     Aug 23, 2018   Procedure with Nestor Phoenix MD   [facility], [place], Same Day Surgery (--)    [address] [address]   [phone]            Sep 05, 2018  2:30 PM CDT   MA SCREENING DIGITAL BILATERAL with [id number]   [facility] ([facility])    [address] [id number] [phone]           Do not use any powder, lotion or deodorant under your arms or on your breast. If you do, we will ask you to remove it before your exam.  Wear comfortable, two-piece clothing.  If you have any allergies, tell your care team.  Bring any previous mammograms from other facilities or have them mailed to the breast center. Three-dimensional (3D) mammograms are available at [place] locations in [facility], [facility], [place], [place], and [place]. [facility] locations include [place] and [facility] in [place]. Benefits of 3D mammograms include: - Improved rate of cancer detection - Decreases your chance of having to go back for more tests, which means fewer: - \"False-positive\" results (This means that there is an abnormal area but it isn't cancer.) - Invasive testing procedures, such as a biopsy or surgery - Can provide clearer images of the breast if you have dense breast tissue. 3D mammography is an optional exam that anyone can have " with a 2D mammogram. It doesn't replace or take the place of a 2D mammogram. 2D mammograms remain an effective screening test for all women.  Not all insurance companies cover the cost of a 3D mammogram. Check with your insurance.            Sep 05, 2018  4:20 PM CDT   (Arrive by 4:05 PM)   Return Visit with Vijaya Genao MD   [facility] ([facility])    [address]  4th Floor  [facility] [id number] [phone]            Sep 14, 2018 11:30 AM CDT   (Arrive by 11:15 AM)   Return Visit with Anita Becerra MD   [facility] Rheumatology ([facility])    [address]  [address]  [facility] [id number] [phone]              Who to contact     Please call your clinic at [phone] to:    Ask questions about your health    Make or cancel appointments    Discuss your medicines    Learn about your test results    Speak to your doctor            Additional Information About Your Visit        [employer] Information     [employer] gives you secure access to your electronic health record. If you see a primary care provider, you can also send messages to your care team and make appointments. If you have questions, please call your primary care clinic.  If you do not have a primary care provider, please call [phone] and they will assist you.      [employer] is an electronic gateway that provides easy, online access to your medical records. With [employer], you can request a clinic appointment, read your test results, renew a prescription or communicate with your care team.     To access your existing account, please contact your [facility] or call [phone] for assistance.        Care EveryWhere ID     This is your Care EveryWhere ID. This could be used by other organizations to access your [place] medical records  [id number]         Blood Pressure from Last 3 Encounters:    07/31/18 106/72   06/06/18 107/74   05/15/18 114/76    Weight from Last 3 Encounters:   07/31/18 68.3 kg (150 lb 9.6 oz)   06/06/18 68.2 kg (150 lb 4.8 oz)   05/15/18 68.5 kg (151 lb)              Today, you had the following     No orders found for display       Primary Care Provider Office Phone # Fax #    Justyna Lawson [phone]       [facility] [address]  [place] [address]        Equal Access to Services     [facility]: Hadii aad ku hadasho Soomaali, waaxda luqadaha, qaybta kaalmada adeegyada, ivan wallace . So [facility] [phone].    ATENCIÓN: Si habla español, tiene a muñoz disposición servicios gratuitos de asistencia lingüística. [facility] [phone].    We comply with applicable federal civil rights laws and [place] laws. We do not discriminate on the basis of race, color, national origin, age, disability, sex, sexual orientation, or gender identity.            Thank you!     Thank you for choosing [facility]  for your care. Our goal is always to provide you with excellent care. Hearing back from our patients is one way we can continue to improve our services. Please take a few minutes to complete the written survey that you may receive in the mail after your visit with us. Thank you!             Your Updated Medication List - Protect others around you: Learn how to safely use, store and throw away your medicines at www.disposemymeds.org.          This list is accurate as of 8/17/18  5:10 PM.  Always use your most recent med list.                   Brand Name Dispense Instructions for use Diagnosis    acetaminophen 500 MG Caps      Take 2 mg by mouth 2 times daily        * amitriptyline 50 MG tablet    ELAVIL    60 tablet    Take 1 tablet (50 mg) by mouth At Bedtime    Headache disorder       * amitriptyline 10 MG tablet    ELAVIL    60 tablet    Take 1 tablet (10 mg) by mouth At Bedtime    Headache disorder        amylase-lipase-protease 76606-78478 units Cpep per EC capsule    CREON 24    450 capsule    Take 3-4 capsules by mouth with meals and 1-2 with snacks. Maximum 15 capsules per day.    Acquired total absence of pancreas       aspirin 81 MG EC tablet     90 tablet    Take 1 tablet (81 mg) by mouth daily    High platelet count (H)       azelastine 0.1 % nasal spray    ASTELIN     Spray 1 spray into both nostrils 2 times daily        BD SHARPS  Misc     1 each    1 Container as needed    Post-pancreatectomy diabetes (H)       blood glucose monitoring lancets     1 Box    Use to test blood sugar 6-8 times daily or as directed.    Acute on chronic pancreatitis (H)       blood glucose monitoring test strip    APT CONTOUR NEXT    2 Box    Use to test blood sugar 6 times daily or as directed.    Post-pancreatectomy diabetes (H)       cetirizine 10 MG tablet    zyrTEC     Take 10 mg by mouth daily    Elevated liver enzymes       diphenhydrAMINE 25 MG capsule    BENADRYL ALLERGY    56 capsule    Take 1 capsule (25 mg) by mouth every 6 hours as needed for itching or allergies    Itching, Post-pancreatectomy diabetes (H), History of pancreatectomy, Pancreatic insufficiency       docusate sodium 100 MG capsule    COLACE    60 capsule    Take 1 capsule (100 mg) by mouth 2 times daily as needed for constipation,    Itching, Post-pancreatectomy diabetes (H), History of pancreatectomy, Pancreatic insufficiency       FOLIC ACID PO      Take 1 mg by mouth daily        glucose 40 % (400 mg/mL) Gel gel     3 Tube    Take 15-30 g by mouth every 15 minutes as needed for low blood sugar    Acquired total absence of pancreas       Lubiprostone 8 MCG Caps capsule    AMITIZA    150 capsule    Take 5 capsules (40 mcg) by mouth daily Take 3 capsules in AM and 2 capsules in PM    Other constipation       multivitamin CF formula capsule    CHOICEFUL     Take 1 tablet by mouth daily    Itching, Post-pancreatectomy diabetes (H), History of  pancreatectomy, Pancreatic insufficiency       order for DME     1 Device    Equipment being ordered:Cefaly    Headache disorder       prochlorperazine 5 MG tablet    COMPAZINE    90 tablet    Take two 5 mg tablets by mouth every six hours as needed for nausea.    Itching, Post-pancreatectomy diabetes (H), History of pancreatectomy, Pancreatic insufficiency       ranitidine 150 MG tablet    ZANTAC    90 tablet    Take 1 tablet (150 mg) by mouth At Bedtime    Gastroesophageal reflux disease without esophagitis       SYNTHROID 137 MCG tablet   Generic drug:  levothyroxine      Take 137 mcg by mouth daily        VAGIFEM 10 MCG Tabs vaginal tablet   Generic drug:  estradiol           ZOMIG 5 MG tablet   Generic drug:  ZOLMitriptan      Take 5 mg by mouth at onset of headache for migraine        * Notice:  This list has 2 medication(s) that are the same as other medications prescribed for you. Read the directions carefully, and ask your doctor or other care provider to review them with you.

## 2018-08-17 NOTE — TELEPHONE ENCOUNTER
Shahzad Pagan walked in to the clinic after she had an appt with psychology. She states that she has not been sleeping well due to the anxiety from upcoming surgery on 8/23/18. She was recommended by the psychologist to ask you for lorazepam, wondering if she could have Rx from you. Please advise. Thanks.    Soon-Mi

## 2018-08-17 NOTE — Clinical Note
Suggest you consider a  Few ativan for her this week prior to surgery.   Its triggering insomnia and worry due to previous medical procedures that did not go as planned.

## 2018-08-17 NOTE — PROGRESS NOTES
"[facility]   Medical    Psychology Progress Note    Patient Name: Dinora Mcghee    YOB: 1965   Medical Record Number: [id number]  Date: 8/17/2018              SUBJECTIVE   Reviewed treatment plan            Interval history:  Up to 30 hours a week.     Still 2 or more migraines a week   And lots of anxiety  And insomnia  re upcoming surgery has been triggered.   reminds of the \"simple\" ERCP that resulted in 10 day inpatient and another occasion with atypical  complications  from another simple surgery.     She is using her tools,  Breathing and aud EMDR.      OBJECTIVE:              Length of Visit: 58 minutes        Mental status: Anxious         Session objective:  Anxiety management and pain         Behavioral inventions and response:                          CBT  On SNS  And pain and migraines   And   Tools for helping sleep            Rec write  down worry thoughts                                        ASSESSMENT              Diagnoses:                                                            F45.42   Pain condition with medical and psychological factors                                                                            PLAN:               Next Appointment: Dinora Mcghee will schedule a follow-up appointment in 3 weeks.         Assignment: Establish a daily routine of stretching and exercise, Healthy breathing skills  for daily use, Utilize  Guided imagery  \"EASE  PAIN\"  at least once a day and auditory EMDR         Objectives / interventions for next session:          Management of Anxiety. Goals include: Awareness of and desensitization of triggering events and CBT (adapt constructive thought processes and beliefs regarding fears)   Meanwhile surgery next week.  Suggest provider RX a few benzodiazapine  Q HS prior to surgery so as to improve sleep and reduce tensions    Also get weighted blanket   And order temp biofeedback device for home use for migraine prevention  Read " Russ the Wild elephant                Maria Elena Abdalla, Ph.D., L.P. ...............8/17/2018 4:05 PM                Medical Psychologist

## 2018-08-18 RX ORDER — LORAZEPAM 1 MG/1
1 TABLET ORAL EVERY 6 HOURS PRN
Qty: 3 TABLET | Refills: 0 | Status: ON HOLD | OUTPATIENT
Start: 2018-08-18 | End: 2018-08-24

## 2018-08-21 NOTE — TELEPHONE ENCOUNTER
For some reason, this encounter was closed and it was removed from my inbasket & Erna's inbasket in [facility], so no one could take care of this till I just remembered today. I called the pt and she already got the Rx of Lorazepam from Dr. Phoenix and it helped her sleep.

## 2018-08-22 ENCOUNTER — ANESTHESIA EVENT (OUTPATIENT)
Dept: SURGERY | Facility: CLINIC | Age: 53
End: 2018-08-22
Payer: COMMERCIAL

## 2018-08-23 ENCOUNTER — HOSPITAL ENCOUNTER (OUTPATIENT)
Facility: CLINIC | Age: 53
Setting detail: OBSERVATION
Discharge: HOME | End: 2018-08-24
Attending: SURGERY | Admitting: SURGERY
Payer: COMMERCIAL

## 2018-08-23 ENCOUNTER — ANESTHESIA (OUTPATIENT)
Dept: SURGERY | Facility: CLINIC | Age: 53
End: 2018-08-23
Payer: COMMERCIAL

## 2018-08-23 DIAGNOSIS — E89.1 POST-PANCREATECTOMY DIABETES (H): ICD-10-CM

## 2018-08-23 DIAGNOSIS — F43.22 ADJUSTMENT DISORDER WITH ANXIOUS MOOD: ICD-10-CM

## 2018-08-23 DIAGNOSIS — Z90.410 POST-PANCREATECTOMY DIABETES (H): ICD-10-CM

## 2018-08-23 DIAGNOSIS — K43.2 INCISIONAL HERNIA, WITHOUT OBSTRUCTION OR GANGRENE: Primary | ICD-10-CM

## 2018-08-23 DIAGNOSIS — G89.18 ACUTE POST-OPERATIVE PAIN: ICD-10-CM

## 2018-08-23 DIAGNOSIS — Z71.89 ACP (ADVANCE CARE PLANNING): Chronic | ICD-10-CM

## 2018-08-23 DIAGNOSIS — K21.00 GASTROESOPHAGEAL REFLUX DISEASE WITH ESOPHAGITIS: ICD-10-CM

## 2018-08-23 DIAGNOSIS — E13.9 POST-PANCREATECTOMY DIABETES (H): ICD-10-CM

## 2018-08-23 PROBLEM — Z98.890 S/P HERNIA REPAIR: Status: ACTIVE | Noted: 2018-08-23

## 2018-08-23 PROBLEM — Z87.19 S/P HERNIA REPAIR: Status: ACTIVE | Noted: 2018-08-23

## 2018-08-23 LAB
GLUCOSE BLDC GLUCOMTR-MCNC: 104 MG/DL (ref 70–99)
GLUCOSE BLDC GLUCOMTR-MCNC: 87 MG/DL (ref 70–99)
GLUCOSE BLDC GLUCOMTR-MCNC: 94 MG/DL (ref 70–99)
HCT VFR BLD AUTO: 39.6 % (ref 35–47)
HCT VFR BLD AUTO: 39.7 % (ref 35–47)
HGB BLD-MCNC: 13 G/DL (ref 11.7–15.7)
HGB BLD-MCNC: 13 G/DL (ref 11.7–15.7)

## 2018-08-23 PROCEDURE — 71000014 ZZH RECOVERY PHASE 1 LEVEL 2 FIRST HR: Performed by: SURGERY

## 2018-08-23 PROCEDURE — 25000128 H RX IP 250 OP 636: Performed by: ANESTHESIOLOGY

## 2018-08-23 PROCEDURE — 25000132 ZZH RX MED GY IP 250 OP 250 PS 637: Performed by: PHYSICIAN ASSISTANT

## 2018-08-23 PROCEDURE — 36000057 ZZH SURGERY LEVEL 3 1ST 30 MIN - UMMC: Performed by: SURGERY

## 2018-08-23 PROCEDURE — 71000015 ZZH RECOVERY PHASE 1 LEVEL 2 EA ADDTL HR: Performed by: SURGERY

## 2018-08-23 PROCEDURE — 37000008 ZZH ANESTHESIA TECHNICAL FEE, 1ST 30 MIN: Performed by: SURGERY

## 2018-08-23 PROCEDURE — 85018 HEMOGLOBIN: CPT | Performed by: STUDENT IN AN ORGANIZED HEALTH CARE EDUCATION/TRAINING PROGRAM

## 2018-08-23 PROCEDURE — C9399 UNCLASSIFIED DRUGS OR BIOLOG: HCPCS | Performed by: NURSE ANESTHETIST, CERTIFIED REGISTERED

## 2018-08-23 PROCEDURE — 27210794 ZZH OR GENERAL SUPPLY STERILE: Performed by: SURGERY

## 2018-08-23 PROCEDURE — 25000128 H RX IP 250 OP 636: Performed by: SURGERY

## 2018-08-23 PROCEDURE — 12000001 ZZH R&B MED SURG/OB UMMC

## 2018-08-23 PROCEDURE — 25000125 ZZHC RX 250: Performed by: NURSE ANESTHETIST, CERTIFIED REGISTERED

## 2018-08-23 PROCEDURE — 36415 COLL VENOUS BLD VENIPUNCTURE: CPT | Performed by: STUDENT IN AN ORGANIZED HEALTH CARE EDUCATION/TRAINING PROGRAM

## 2018-08-23 PROCEDURE — 25000132 ZZH RX MED GY IP 250 OP 250 PS 637: Performed by: STUDENT IN AN ORGANIZED HEALTH CARE EDUCATION/TRAINING PROGRAM

## 2018-08-23 PROCEDURE — 25000125 ZZHC RX 250: Performed by: ANESTHESIOLOGY

## 2018-08-23 PROCEDURE — 37000009 ZZH ANESTHESIA TECHNICAL FEE, EACH ADDTL 15 MIN: Performed by: SURGERY

## 2018-08-23 PROCEDURE — 00000146 ZZHCL STATISTIC GLUCOSE BY METER IP

## 2018-08-23 PROCEDURE — 85014 HEMATOCRIT: CPT | Performed by: STUDENT IN AN ORGANIZED HEALTH CARE EDUCATION/TRAINING PROGRAM

## 2018-08-23 PROCEDURE — 25000125 ZZHC RX 250: Performed by: NURSE PRACTITIONER

## 2018-08-23 PROCEDURE — C9290 INJ, BUPIVACAINE LIPOSOME: HCPCS | Performed by: SURGERY

## 2018-08-23 PROCEDURE — 40000141 ZZH STATISTIC PERIPHERAL IV START W/O US GUIDANCE

## 2018-08-23 PROCEDURE — 36000059 ZZH SURGERY LEVEL 3 EA 15 ADDTL MIN UMMC: Performed by: SURGERY

## 2018-08-23 PROCEDURE — 25000565 ZZH ISOFLURANE, EA 15 MIN: Performed by: SURGERY

## 2018-08-23 PROCEDURE — 25000128 H RX IP 250 OP 636: Performed by: STUDENT IN AN ORGANIZED HEALTH CARE EDUCATION/TRAINING PROGRAM

## 2018-08-23 PROCEDURE — 25000128 H RX IP 250 OP 636: Performed by: PHYSICIAN ASSISTANT

## 2018-08-23 PROCEDURE — 25000128 H RX IP 250 OP 636

## 2018-08-23 PROCEDURE — 25000128 H RX IP 250 OP 636: Performed by: NURSE ANESTHETIST, CERTIFIED REGISTERED

## 2018-08-23 PROCEDURE — G0378 HOSPITAL OBSERVATION PER HR: HCPCS

## 2018-08-23 PROCEDURE — 25000128 H RX IP 250 OP 636: Performed by: NURSE PRACTITIONER

## 2018-08-23 PROCEDURE — C1781 MESH (IMPLANTABLE): HCPCS | Performed by: SURGERY

## 2018-08-23 PROCEDURE — 40000171 ZZH STATISTIC PRE-PROCEDURE ASSESSMENT III: Performed by: SURGERY

## 2018-08-23 PROCEDURE — 25000132 ZZH RX MED GY IP 250 OP 250 PS 637: Performed by: SURGERY

## 2018-08-23 DEVICE — MESH SYMBOTEX COMPOSITE STEX ROUND 12CM SYM12: Type: IMPLANTABLE DEVICE | Site: UMBILICAL | Status: FUNCTIONAL

## 2018-08-23 RX ORDER — ONDANSETRON 4 MG/1
4 TABLET, ORALLY DISINTEGRATING ORAL EVERY 6 HOURS PRN
Status: DISCONTINUED | OUTPATIENT
Start: 2018-08-23 | End: 2018-08-24 | Stop reason: HOSPADM

## 2018-08-23 RX ORDER — SODIUM CHLORIDE, SODIUM LACTATE, POTASSIUM CHLORIDE, CALCIUM CHLORIDE 600; 310; 30; 20 MG/100ML; MG/100ML; MG/100ML; MG/100ML
INJECTION, SOLUTION INTRAVENOUS CONTINUOUS
Status: DISCONTINUED | OUTPATIENT
Start: 2018-08-23 | End: 2018-08-23 | Stop reason: HOSPADM

## 2018-08-23 RX ORDER — NALOXONE HYDROCHLORIDE 0.4 MG/ML
.1-.4 INJECTION, SOLUTION INTRAMUSCULAR; INTRAVENOUS; SUBCUTANEOUS
Status: ACTIVE | OUTPATIENT
Start: 2018-08-23 | End: 2018-08-24

## 2018-08-23 RX ORDER — HYDROMORPHONE HYDROCHLORIDE 2 MG/1
2 TABLET ORAL
Status: DISCONTINUED | OUTPATIENT
Start: 2018-08-23 | End: 2018-08-23

## 2018-08-23 RX ORDER — HYDROMORPHONE HYDROCHLORIDE 1 MG/ML
0.3 INJECTION, SOLUTION INTRAMUSCULAR; INTRAVENOUS; SUBCUTANEOUS
Status: DISCONTINUED | OUTPATIENT
Start: 2018-08-23 | End: 2018-08-23

## 2018-08-23 RX ORDER — PEDIATRIC MULTIVIT 61/D3/VIT K 1500-800
1 CAPSULE ORAL DAILY
Status: DISCONTINUED | OUTPATIENT
Start: 2018-08-23 | End: 2018-08-24 | Stop reason: HOSPADM

## 2018-08-23 RX ORDER — LUBIPROSTONE 8 UG/1
5 CAPSULE ORAL DAILY
Status: DISCONTINUED | OUTPATIENT
Start: 2018-08-23 | End: 2018-08-24 | Stop reason: HOSPADM

## 2018-08-23 RX ORDER — LIDOCAINE 40 MG/G
CREAM TOPICAL
Status: DISCONTINUED | OUTPATIENT
Start: 2018-08-23 | End: 2018-08-23 | Stop reason: HOSPADM

## 2018-08-23 RX ORDER — ZOLMITRIPTAN 5 MG/1
5 TABLET, FILM COATED ORAL
Status: DISCONTINUED | OUTPATIENT
Start: 2018-08-23 | End: 2018-08-24 | Stop reason: HOSPADM

## 2018-08-23 RX ORDER — SODIUM CHLORIDE, SODIUM LACTATE, POTASSIUM CHLORIDE, CALCIUM CHLORIDE 600; 310; 30; 20 MG/100ML; MG/100ML; MG/100ML; MG/100ML
INJECTION, SOLUTION INTRAVENOUS CONTINUOUS
Status: DISCONTINUED | OUTPATIENT
Start: 2018-08-23 | End: 2018-08-24 | Stop reason: HOSPADM

## 2018-08-23 RX ORDER — AZELASTINE 1 MG/ML
1 SPRAY, METERED NASAL 2 TIMES DAILY
Status: DISCONTINUED | OUTPATIENT
Start: 2018-08-23 | End: 2018-08-24 | Stop reason: HOSPADM

## 2018-08-23 RX ORDER — LEVOFLOXACIN 5 MG/ML
500 INJECTION, SOLUTION INTRAVENOUS EVERY 24 HOURS
Status: DISCONTINUED | OUTPATIENT
Start: 2018-08-23 | End: 2018-08-23 | Stop reason: HOSPADM

## 2018-08-23 RX ORDER — AMOXICILLIN 250 MG
2 CAPSULE ORAL 2 TIMES DAILY
Status: DISCONTINUED | OUTPATIENT
Start: 2018-08-23 | End: 2018-08-24 | Stop reason: HOSPADM

## 2018-08-23 RX ORDER — HYDROMORPHONE HYDROCHLORIDE 1 MG/ML
INJECTION, SOLUTION INTRAMUSCULAR; INTRAVENOUS; SUBCUTANEOUS
Status: COMPLETED
Start: 2018-08-23 | End: 2018-08-23

## 2018-08-23 RX ORDER — PROCHLORPERAZINE MALEATE 5 MG
5-10 TABLET ORAL EVERY 6 HOURS PRN
Status: DISCONTINUED | OUTPATIENT
Start: 2018-08-23 | End: 2018-08-24 | Stop reason: HOSPADM

## 2018-08-23 RX ORDER — BUPIVACAINE HYDROCHLORIDE 2.5 MG/ML
INJECTION, SOLUTION EPIDURAL; INFILTRATION; INTRACAUDAL PRN
Status: DISCONTINUED | OUTPATIENT
Start: 2018-08-23 | End: 2018-08-23

## 2018-08-23 RX ORDER — GLYCOPYRROLATE 0.2 MG/ML
INJECTION, SOLUTION INTRAMUSCULAR; INTRAVENOUS PRN
Status: DISCONTINUED | OUTPATIENT
Start: 2018-08-23 | End: 2018-08-23

## 2018-08-23 RX ORDER — DIPHENHYDRAMINE HCL 25 MG
25 CAPSULE ORAL EVERY 6 HOURS PRN
Status: DISCONTINUED | OUTPATIENT
Start: 2018-08-23 | End: 2018-08-24 | Stop reason: HOSPADM

## 2018-08-23 RX ORDER — ONDANSETRON 2 MG/ML
4 INJECTION INTRAMUSCULAR; INTRAVENOUS EVERY 6 HOURS PRN
Status: DISCONTINUED | OUTPATIENT
Start: 2018-08-23 | End: 2018-08-24 | Stop reason: HOSPADM

## 2018-08-23 RX ORDER — LABETALOL HYDROCHLORIDE 5 MG/ML
10 INJECTION, SOLUTION INTRAVENOUS
Status: DISCONTINUED | OUTPATIENT
Start: 2018-08-23 | End: 2018-08-23 | Stop reason: HOSPADM

## 2018-08-23 RX ORDER — NALOXONE HYDROCHLORIDE 0.4 MG/ML
.1-.4 INJECTION, SOLUTION INTRAMUSCULAR; INTRAVENOUS; SUBCUTANEOUS
Status: DISCONTINUED | OUTPATIENT
Start: 2018-08-23 | End: 2018-08-24 | Stop reason: HOSPADM

## 2018-08-23 RX ORDER — FLUMAZENIL 0.1 MG/ML
0.2 INJECTION, SOLUTION INTRAVENOUS
Status: DISCONTINUED | OUTPATIENT
Start: 2018-08-23 | End: 2018-08-23 | Stop reason: HOSPADM

## 2018-08-23 RX ORDER — NICOTINE POLACRILEX 4 MG
15-30 LOZENGE BUCCAL
Status: DISCONTINUED | OUTPATIENT
Start: 2018-08-23 | End: 2018-08-24 | Stop reason: HOSPADM

## 2018-08-23 RX ORDER — PROMETHAZINE HYDROCHLORIDE 25 MG/ML
25 INJECTION, SOLUTION INTRAMUSCULAR; INTRAVENOUS EVERY 6 HOURS PRN
Status: DISCONTINUED | OUTPATIENT
Start: 2018-08-23 | End: 2018-08-23 | Stop reason: HOSPADM

## 2018-08-23 RX ORDER — AMITRIPTYLINE HYDROCHLORIDE 50 MG/1
50 TABLET ORAL AT BEDTIME
Status: DISCONTINUED | OUTPATIENT
Start: 2018-08-23 | End: 2018-08-24 | Stop reason: HOSPADM

## 2018-08-23 RX ORDER — CLINDAMYCIN PHOSPHATE 900 MG/50ML
900 INJECTION, SOLUTION INTRAVENOUS EVERY 8 HOURS
Status: DISCONTINUED | OUTPATIENT
Start: 2018-08-23 | End: 2018-08-23 | Stop reason: HOSPADM

## 2018-08-23 RX ORDER — NALOXONE HYDROCHLORIDE 0.4 MG/ML
.1-.4 INJECTION, SOLUTION INTRAMUSCULAR; INTRAVENOUS; SUBCUTANEOUS
Status: DISCONTINUED | OUTPATIENT
Start: 2018-08-23 | End: 2018-08-23 | Stop reason: HOSPADM

## 2018-08-23 RX ORDER — CETIRIZINE HYDROCHLORIDE 10 MG/1
10 TABLET ORAL DAILY
Status: DISCONTINUED | OUTPATIENT
Start: 2018-08-23 | End: 2018-08-24 | Stop reason: HOSPADM

## 2018-08-23 RX ORDER — ASPIRIN 81 MG/1
81 TABLET ORAL DAILY
Status: DISCONTINUED | OUTPATIENT
Start: 2018-08-23 | End: 2018-08-24 | Stop reason: HOSPADM

## 2018-08-23 RX ORDER — HYDROMORPHONE HYDROCHLORIDE 1 MG/ML
.3-.5 INJECTION, SOLUTION INTRAMUSCULAR; INTRAVENOUS; SUBCUTANEOUS
Status: DISCONTINUED | OUTPATIENT
Start: 2018-08-23 | End: 2018-08-23 | Stop reason: HOSPADM

## 2018-08-23 RX ORDER — SCOLOPAMINE TRANSDERMAL SYSTEM 1 MG/1
PATCH, EXTENDED RELEASE TRANSDERMAL PRN
Status: DISCONTINUED | OUTPATIENT
Start: 2018-08-23 | End: 2018-08-23

## 2018-08-23 RX ORDER — AMOXICILLIN 250 MG
1 CAPSULE ORAL 2 TIMES DAILY
Status: DISCONTINUED | OUTPATIENT
Start: 2018-08-23 | End: 2018-08-24 | Stop reason: HOSPADM

## 2018-08-23 RX ORDER — LORAZEPAM 1 MG/1
1 TABLET ORAL EVERY 6 HOURS PRN
Status: DISCONTINUED | OUTPATIENT
Start: 2018-08-23 | End: 2018-08-24 | Stop reason: HOSPADM

## 2018-08-23 RX ORDER — FENTANYL CITRATE 50 UG/ML
25-50 INJECTION, SOLUTION INTRAMUSCULAR; INTRAVENOUS
Status: DISCONTINUED | OUTPATIENT
Start: 2018-08-23 | End: 2018-08-23 | Stop reason: HOSPADM

## 2018-08-23 RX ORDER — PROPOFOL 10 MG/ML
INJECTION, EMULSION INTRAVENOUS PRN
Status: DISCONTINUED | OUTPATIENT
Start: 2018-08-23 | End: 2018-08-23

## 2018-08-23 RX ORDER — KETOROLAC TROMETHAMINE 30 MG/ML
INJECTION, SOLUTION INTRAMUSCULAR; INTRAVENOUS PRN
Status: DISCONTINUED | OUTPATIENT
Start: 2018-08-23 | End: 2018-08-23

## 2018-08-23 RX ORDER — HYDROMORPHONE HCL/0.9% NACL/PF 0.2MG/0.2
0.2 SYRINGE (ML) INTRAVENOUS
Status: DISCONTINUED | OUTPATIENT
Start: 2018-08-23 | End: 2018-08-24 | Stop reason: HOSPADM

## 2018-08-23 RX ORDER — KETOROLAC TROMETHAMINE 30 MG/ML
30 INJECTION, SOLUTION INTRAMUSCULAR; INTRAVENOUS EVERY 6 HOURS PRN
Status: DISCONTINUED | OUTPATIENT
Start: 2018-08-23 | End: 2018-08-24

## 2018-08-23 RX ORDER — EPHEDRINE SULFATE 50 MG/ML
INJECTION, SOLUTION INTRAMUSCULAR; INTRAVENOUS; SUBCUTANEOUS PRN
Status: DISCONTINUED | OUTPATIENT
Start: 2018-08-23 | End: 2018-08-23

## 2018-08-23 RX ORDER — KETOROLAC TROMETHAMINE 30 MG/ML
15 INJECTION, SOLUTION INTRAMUSCULAR; INTRAVENOUS EVERY 8 HOURS
Status: DISCONTINUED | OUTPATIENT
Start: 2018-08-23 | End: 2018-08-23

## 2018-08-23 RX ORDER — FENTANYL CITRATE 50 UG/ML
INJECTION, SOLUTION INTRAMUSCULAR; INTRAVENOUS PRN
Status: DISCONTINUED | OUTPATIENT
Start: 2018-08-23 | End: 2018-08-23

## 2018-08-23 RX ADMIN — FENTANYL CITRATE 50 MCG: 50 INJECTION, SOLUTION INTRAMUSCULAR; INTRAVENOUS at 06:41

## 2018-08-23 RX ADMIN — ROCURONIUM BROMIDE 20 MG: 10 INJECTION INTRAVENOUS at 08:21

## 2018-08-23 RX ADMIN — BUPIVACAINE HYDROCHLORIDE 20 ML: 2.5 INJECTION, SOLUTION EPIDURAL; INFILTRATION; INTRACAUDAL at 06:40

## 2018-08-23 RX ADMIN — MIDAZOLAM 2 MG: 1 INJECTION INTRAMUSCULAR; INTRAVENOUS at 07:24

## 2018-08-23 RX ADMIN — CLINDAMYCIN PHOSPHATE 900 MG: 18 INJECTION, SOLUTION INTRAVENOUS at 07:50

## 2018-08-23 RX ADMIN — Medication 0.3 MG: at 14:39

## 2018-08-23 RX ADMIN — PROMETHAZINE HYDROCHLORIDE 25 MG: 25 INJECTION INTRAMUSCULAR; INTRAVENOUS at 09:35

## 2018-08-23 RX ADMIN — LEVOTHYROXINE SODIUM 137 MCG: 25 TABLET ORAL at 14:37

## 2018-08-23 RX ADMIN — SCOLOPAMINE TRANSDERMAL SYSTEM 1 PATCH: 1 PATCH, EXTENDED RELEASE TRANSDERMAL at 07:45

## 2018-08-23 RX ADMIN — SODIUM CHLORIDE, POTASSIUM CHLORIDE, SODIUM LACTATE AND CALCIUM CHLORIDE: 600; 310; 30; 20 INJECTION, SOLUTION INTRAVENOUS at 19:13

## 2018-08-23 RX ADMIN — Medication 1 MG: at 18:07

## 2018-08-23 RX ADMIN — PHENYLEPHRINE HYDROCHLORIDE 200 MCG: 10 INJECTION, SOLUTION INTRAMUSCULAR; INTRAVENOUS; SUBCUTANEOUS at 09:13

## 2018-08-23 RX ADMIN — SODIUM CHLORIDE, POTASSIUM CHLORIDE, SODIUM LACTATE AND CALCIUM CHLORIDE: 600; 310; 30; 20 INJECTION, SOLUTION INTRAVENOUS at 23:43

## 2018-08-23 RX ADMIN — KETOROLAC TROMETHAMINE 30 MG: 30 INJECTION, SOLUTION INTRAMUSCULAR at 23:50

## 2018-08-23 RX ADMIN — PHENYLEPHRINE HYDROCHLORIDE 50 MCG: 10 INJECTION, SOLUTION INTRAMUSCULAR; INTRAVENOUS; SUBCUTANEOUS at 08:26

## 2018-08-23 RX ADMIN — ROCURONIUM BROMIDE 40 MG: 10 INJECTION INTRAVENOUS at 07:34

## 2018-08-23 RX ADMIN — FENTANYL CITRATE 50 MCG: 50 INJECTION, SOLUTION INTRAMUSCULAR; INTRAVENOUS at 11:30

## 2018-08-23 RX ADMIN — Medication 0.5 MG: at 11:00

## 2018-08-23 RX ADMIN — ROCURONIUM BROMIDE 10 MG: 10 INJECTION INTRAVENOUS at 08:32

## 2018-08-23 RX ADMIN — MIDAZOLAM 1 MG: 1 INJECTION INTRAMUSCULAR; INTRAVENOUS at 06:41

## 2018-08-23 RX ADMIN — ROCURONIUM BROMIDE 10 MG: 10 INJECTION INTRAVENOUS at 08:00

## 2018-08-23 RX ADMIN — SODIUM CHLORIDE, POTASSIUM CHLORIDE, SODIUM LACTATE AND CALCIUM CHLORIDE: 600; 310; 30; 20 INJECTION, SOLUTION INTRAVENOUS at 07:24

## 2018-08-23 RX ADMIN — LEVOFLOXACIN 500 MG: 5 INJECTION, SOLUTION INTRAVENOUS at 06:24

## 2018-08-23 RX ADMIN — PHENYLEPHRINE HYDROCHLORIDE 100 MCG: 10 INJECTION, SOLUTION INTRAMUSCULAR; INTRAVENOUS; SUBCUTANEOUS at 08:12

## 2018-08-23 RX ADMIN — SODIUM CHLORIDE, POTASSIUM CHLORIDE, SODIUM LACTATE AND CALCIUM CHLORIDE: 600; 310; 30; 20 INJECTION, SOLUTION INTRAVENOUS at 11:14

## 2018-08-23 RX ADMIN — GLYCOPYRROLATE 0.2 MG: 0.2 INJECTION, SOLUTION INTRAMUSCULAR; INTRAVENOUS at 08:13

## 2018-08-23 RX ADMIN — SENNOSIDES AND DOCUSATE SODIUM 1 TABLET: 8.6; 5 TABLET ORAL at 20:51

## 2018-08-23 RX ADMIN — KETOROLAC TROMETHAMINE 15 MG: 30 INJECTION, SOLUTION INTRAMUSCULAR at 09:45

## 2018-08-23 RX ADMIN — SODIUM CHLORIDE 500 ML: 9 INJECTION, SOLUTION INTRAVENOUS at 17:20

## 2018-08-23 RX ADMIN — FENTANYL CITRATE 25 MCG: 50 INJECTION, SOLUTION INTRAMUSCULAR; INTRAVENOUS at 10:53

## 2018-08-23 RX ADMIN — ROCURONIUM BROMIDE 10 MG: 10 INJECTION INTRAVENOUS at 08:05

## 2018-08-23 RX ADMIN — LUBIPROSTONE 3 CAPSULE: 8 CAPSULE, GELATIN COATED ORAL at 17:16

## 2018-08-23 RX ADMIN — Medication 0.3 MG: at 12:45

## 2018-08-23 RX ADMIN — PHENYLEPHRINE HYDROCHLORIDE 150 MCG: 10 INJECTION, SOLUTION INTRAMUSCULAR; INTRAVENOUS; SUBCUTANEOUS at 08:54

## 2018-08-23 RX ADMIN — PHENYLEPHRINE HYDROCHLORIDE 200 MCG: 10 INJECTION, SOLUTION INTRAMUSCULAR; INTRAVENOUS; SUBCUTANEOUS at 08:56

## 2018-08-23 RX ADMIN — ROCURONIUM BROMIDE 10 MG: 10 INJECTION INTRAVENOUS at 08:49

## 2018-08-23 RX ADMIN — ZOLMITRIPTAN 5 MG: 5 TABLET, FILM COATED ORAL at 22:59

## 2018-08-23 RX ADMIN — Medication 5 MG: at 08:58

## 2018-08-23 RX ADMIN — FENTANYL CITRATE 100 MCG: 50 INJECTION, SOLUTION INTRAMUSCULAR; INTRAVENOUS at 07:34

## 2018-08-23 RX ADMIN — RANITIDINE 150 MG: 150 TABLET, FILM COATED ORAL at 20:52

## 2018-08-23 RX ADMIN — AMITRIPTYLINE HYDROCHLORIDE 50 MG: 50 TABLET, FILM COATED ORAL at 20:52

## 2018-08-23 RX ADMIN — PANCRELIPASE 3 CAPSULE: 24000; 76000; 120000 CAPSULE, DELAYED RELEASE PELLETS ORAL at 17:15

## 2018-08-23 RX ADMIN — KETOROLAC TROMETHAMINE 30 MG: 30 INJECTION, SOLUTION INTRAMUSCULAR at 15:58

## 2018-08-23 RX ADMIN — SUGAMMADEX 200 MG: 100 INJECTION, SOLUTION INTRAVENOUS at 09:39

## 2018-08-23 RX ADMIN — BUPIVACAINE 20 ML: 13.3 INJECTION, SUSPENSION, LIPOSOMAL INFILTRATION at 06:40

## 2018-08-23 RX ADMIN — ROCURONIUM BROMIDE 10 MG: 10 INJECTION INTRAVENOUS at 09:09

## 2018-08-23 RX ADMIN — SODIUM CHLORIDE, POTASSIUM CHLORIDE, SODIUM LACTATE AND CALCIUM CHLORIDE: 600; 310; 30; 20 INJECTION, SOLUTION INTRAVENOUS at 11:08

## 2018-08-23 RX ADMIN — PROPOFOL 80 MG: 10 INJECTION, EMULSION INTRAVENOUS at 07:34

## 2018-08-23 RX ADMIN — Medication 2 MG: at 21:03

## 2018-08-23 RX ADMIN — PROCHLORPERAZINE MALEATE 5 MG: 5 TABLET, FILM COATED ORAL at 13:43

## 2018-08-23 RX ADMIN — PROPOFOL 30 MG: 10 INJECTION, EMULSION INTRAVENOUS at 09:40

## 2018-08-23 ASSESSMENT — PAIN DESCRIPTION - DESCRIPTORS
DESCRIPTORS: SHARP

## 2018-08-23 ASSESSMENT — ACTIVITIES OF DAILY LIVING (ADL)
CHANGE_IN_FUNCTIONAL_STATUS_SINCE_ONSET_OF_CURRENT_ILLNESS/INJURY: NO
FALL_HISTORY_WITHIN_LAST_SIX_MONTHS: NO
RETIRED_EATING: 0-->INDEPENDENT
DRESS: 0 - INDEPENDENT
AMBULATION: 0-->INDEPENDENT
AMBULATION: 2 - ASSISTIVE PERSON
ADLS_ACUITY_SCORE: 12
EATING: 0 - INDEPENDENT
COMMUNICATION: 0 - UNDERSTANDS/COMMUNICATES WITHOUT DIFFICULTY
DRESS: 0-->INDEPENDENT
SWALLOWING: 0 - SWALLOWS FOODS/LIQUIDS WITHOUT DIFFICULTY
TOILETING: 2 - ASSISTIVE PERSON
BATHING: 0-->INDEPENDENT
RETIRED_COMMUNICATION: 0-->UNDERSTANDS/COMMUNICATES WITHOUT DIFFICULTY
BATHING: 0 - INDEPENDENT
COGNITION: 0 - NO COGNITION ISSUES REPORTED
TOILETING: 0-->INDEPENDENT
TRANSFERRING: 2 - ASSISTIVE PERSON
TRANSFERRING: 0-->INDEPENDENT
ADLS_ACUITY_SCORE: 12
SWALLOWING: 0-->SWALLOWS FOODS/LIQUIDS WITHOUT DIFFICULTY
ADLS_ACUITY_SCORE: 12

## 2018-08-23 NOTE — IP AVS SNAPSHOT
[address] [address]    Phone:  [phone]                                       After Visit Summary   8/23/2018    Dinora Mcghee    MRN: [id number]           After Visit Summary Signature Page     I have received my discharge instructions, and my questions have been answered. I have discussed any challenges I see with this plan with the nurse or doctor.    ..........................................................................................................................................  Patient/Patient Representative Signature      ..........................................................................................................................................  Patient Representative Print Name and Relationship to Patient    ..................................................               ................................................  Date                                            Time    ..........................................................................................................................................  Reviewed by Signature/Title    ...................................................              ..............................................  Date                                                            Time          22EPIC Rev 08/18

## 2018-08-23 NOTE — ANESTHESIA CARE TRANSFER NOTE
Patient: Dinora Mcghee    Procedure(s):  Laparoscopic Incisional Hernia Repair with Symbotex Mesh Implant - Wound Class: I-Clean    Diagnosis: Incisional Hernia   Diagnosis Additional Information: No value filed.    Anesthesia Type:   General     Note:  Airway :Face Mask  Patient transferred to:PACU  Comments: To PACU on supplemental O2 with + air exchange, placed on monitor. Report given to RN, questions answered, patient sleepy, VSS, SpO2 100%.Handoff Report: Identifed the Patient, Identified the Reponsible Provider, Reviewed the pertinent medical history, Discussed the surgical course, Reviewed Intra-OP anesthesia mangement and issues during anesthesia, Set expectations for post-procedure period and Allowed opportunity for questions and acknowledgement of understanding      Vitals: (Last set prior to Anesthesia Care Transfer)    CRNA VITALS  8/23/2018 0924 - 8/23/2018 1000      8/23/2018             Pulse: 85    SpO2: 100 %                Electronically Signed By: NATALY Bass CRNA  August 23, 2018  10:00 AM

## 2018-08-23 NOTE — PLAN OF CARE
Problem: Patient Care Overview  Goal: Plan of Care/Patient Progress Review  Outcome: No Change  Patient arrived on 7C from PACU at appx noon. Patient was teary eyed due to right abdominal pain. Gave IV dilaudid and ice packs with relief, pain increases with activity. Ate a small amount of a popcycle and got nauseous, compazine was effective. [place] has good urine volumes. Spouse at bedside. Continue post op cares, capno in place.

## 2018-08-23 NOTE — ANESTHESIA PREPROCEDURE EVALUATION
Anesthesia Evaluation     . Pt has had prior anesthetic. Type: General    History of anesthetic complications   - PONV        ROS/MED HX    ENT/Pulmonary:  - neg pulmonary ROS     Neurologic:     (+)migraines, other neuro hearing loss, vertigo    Cardiovascular:  - neg cardiovascular ROS       METS/Exercise Tolerance:     Hematologic:  - neg hematologic  ROS       Musculoskeletal:   (+) , , other musculoskeletal- lumbago, chrondromalacia, stiff shoulder      GI/Hepatic:     (+) GERD Asymptomatic on medication, hiatal hernia, Other GI/Hepatic gastroparesis      Renal/Genitourinary:  - ROS Renal section negative       Endo:     (+) thyroid problem hypothyroidism, Other Endocrine Disorder post-pancreatectomy diabetes; pancreatic insufficiency.      Psychiatric:  - neg psychiatric ROS       Infectious Disease:  - neg infectious disease ROS       Malignancy:      - no malignancy   Other:    (+) No chance of pregnancy C-spine cleared: N/A, other significant disability Other (comment)                   Physical Exam  Normal systems: cardiovascular, pulmonary and dental    Airway   Mallampati: II  TM distance: >3 FB  Neck ROM: full    Dental     Cardiovascular       Pulmonary                     Anesthesia Plan      History & Physical Review  History and physical reviewed and following examination; no interval change.    ASA Status:  3 .    NPO Status:  > 8 hours    Plan for General with Intravenous induction. Maintenance will be Balanced.    PONV prophylaxis:  Scopolamine patch and Promethazine or metoclopramide  Additional equipment: 2nd IV      Postoperative Care  Postoperative pain management:  IV analgesics and Multi-modal analgesia.      Consents  Anesthetic plan, risks, benefits and alternatives discussed with:  Patient..                          .

## 2018-08-23 NOTE — ANESTHESIA POSTPROCEDURE EVALUATION
Patient: Dinora Mcghee    Procedure(s):  Laparoscopic Incisional Hernia Repair with Symbotex Mesh Implant - Wound Class: I-Clean    Diagnosis:Incisional Hernia   Diagnosis Additional Information: No value filed.    Anesthesia Type:  General    Note:  Anesthesia Post Evaluation    Patient location during evaluation: PACU  Patient participation: Able to fully participate in evaluation  Level of consciousness: awake  Pain management: adequate  Airway patency: patent  Cardiovascular status: acceptable  Respiratory status: acceptable  Hydration status: acceptable  PONV: none     Anesthetic complications: None          Last vitals:  Vitals:    08/23/18 1000 08/23/18 1015 08/23/18 1035   BP: 91/55 (!) 89/54 98/50   Pulse:      Resp: 8 10 12   Temp: 35.4  C (95.7  F) 35.3  C (95.5  F) 35.4  C (95.7  F)   SpO2: 100% 100% 100%         Electronically Signed By: Sebastian Howe MD  August 23, 2018  11:04 AM   60

## 2018-08-23 NOTE — BRIEF OP NOTE
[facility], [place]    Pre-operative diagnosis: Incisional Hernia    Post-operative diagnosis Same   Procedure: Procedure(s):  Laparoscopic Incisional Hernia Repair with Parietex Mesh Implant - Wound Class: I-Clean   Surgeon: Nestor Phoenix   Assistants(s): Joselo Evans   Anesthesia: Combined General with Block    Estimated blood loss: Less than 50 ml    Total IV fluids: (See anesthesia record)  Minimal   Blood transfusion: No transfusion was given during surgery   Total urine output: (See anesthesia record)  600ml   Drains: None   Specimens: None   Implants: Parietex mesh 12 cm    Findings: umbilical hernia 2 cm defect .   Complications: None.   Condition: Extubated  Transferred to post-anesthesia recovery   Comments: See dictated operative report for full details

## 2018-08-23 NOTE — IP AVS SNAPSHOT
"                  MRN:[id number]                      After Visit Summary   8/23/2018    Dinora Mcghee    MRN: [id number]           Thank you!     Thank you for choosing [facility] for your care. Our goal is always to provide you with excellent care. Hearing back from our patients is one way we can continue to improve our services. Please take a few minutes to complete the written survey that you may receive in the mail after you visit with us. Thank you!        Patient Information     Date Of Birth          1965        Designated Caregiver       Most Recent Value    Caregiver    Will someone help with your care after discharge? yes    Name of designated caregiver Yoselyn \"daughter\"    Phone number of caregiver [phone]    Caregiver address daughter will be living with pt      About your hospital stay     You were admitted on:  August 23, 2018 You last received care in the:  [address] [place] [address]    You were discharged on:  August 24, 2018        Reason for your hospital stay       Patient underwent a laparoscopic incisional hernia with mesh on 8/23/18.                  Who to Call     For medical emergencies, please call 911.  For non-urgent questions about your medical care, please call your primary care provider or clinic, [phone]  For questions related to your surgery, please call your surgery clinic        Attending Provider     Provider Specialty    Nestor Phoenix MD Transplant       Primary Care Provider Office Phone # Fax #    NATALY Pelaez [phone]       When to contact your care team       Call your transplant coordinator at [phone] if you have any of the following: sustained temperature greater than 100.4 or isolated fever spike to 101.5, increased pain or redness at surgical site.    If it is outside of office hours, please call the hospital switchboard at [phone] and ask to have the transplant surgery fellow paged for urgent medical questions.   "                After Care Instructions     Activity       Your activity upon discharge: Walk at least four times a day, lift no greater than 10 pounds for 6 weeks from the time of surgery.  After 6 weeks time please return to your normal exercise routine.  No driving while taking narcotics or until 3 weeks after surgery.            Diet       Follow this diet upon discharge: Regular Diet Adult            Wound care and dressings       Instructions to care for your wound at home: Keep wound clean and dry.  Pt may shower but do not soak incision in pool, hot tub, or bath for 3 weeks                  Follow-up Appointments     Adult [facility] Follow-up and recommended labs and tests       Resume previously scheduled appointments with GI.    Will not schedule routine post-op follow up, If not healing well, or increased pain call your coordinator to see Dr. Honeycutt in clinic.    Appointments on [place] and/or [facility] (with [facility] or [facility] provider or service). Call [phone] if you haven't heard regarding these appointments within 7 days of discharge.                  Your next 10 appointments already scheduled     Sep 05, 2018  2:30 PM CDT   MA SCREENING DIGITAL BILATERAL with [id number]   [facility] ([facility])    [address] [place] [id number] [phone]           Do not use any powder, lotion or deodorant under your arms or on your breast. If you do, we will ask you to remove it before your exam.  Wear comfortable, two-piece clothing.  If you have any allergies, tell your care team.  Bring any previous mammograms from other facilities or have them mailed to the breast center. Three-dimensional (3D) mammograms are available at [facility] locations in [facility], [facility], [facility], and [place]. [facility] locations include [facility] in [place]. Benefits of 3D  "mammograms include: - Improved rate of cancer detection - Decreases your chance of having to go back for more tests, which means fewer: - \"False-positive\" results (This means that there is an abnormal area but it isn't cancer.) - Invasive testing procedures, such as a biopsy or surgery - Can provide clearer images of the breast if you have dense breast tissue. 3D mammography is an optional exam that anyone can have with a 2D mammogram. It doesn't replace or take the place of a 2D mammogram. 2D mammograms remain an effective screening test for all women.  Not all insurance companies cover the cost of a 3D mammogram. Check with your insurance.            Sep 05, 2018  4:20 PM CDT   (Arrive by 4:05 PM)   Return Visit with Vijaya Genao MD   [facility] ([facility])    [address] [place] [address]  4th Floor  [facility] [id number] [phone]            Sep 14, 2018 11:30 AM CDT   (Arrive by 11:15 AM)   Return Visit with Anita Becerra MD   [facility] ([facility])    [address] [place] [address]  [address]  [facility] [id number] [phone]              Additional Information     If you use hormonal birth control (such as the pill, patch, ring or implants): You'll need a second form of birth control for 7 days (condoms, a diaphragm or contraceptive foam). While in the hospital, you received a medicine called Bridion. Your normal birth control will not work as well for a week after taking this medicine.          Pending Results     No orders found for last 3 day(s).            Statement of Approval     Ordered          08/24/18 9262  I have reviewed and agree with all the recommendations and orders detailed in this document.  EFFECTIVE NOW     Approved and electronically signed by:  Altagracia Rush PA-C             Admission Information     Date & Time Provider Department Dept. Phone    8/23/2018 Nestor Phoenix, " MD [address] [facility] [phone]      Your Vitals Were     Blood Pressure Pulse Temperature Respirations Weight Pulse Oximetry    97/50 (BP Location: Left arm) 75 99.1  F (37.3  C) (Oral) 16 70.4 kg (155 lb 3.2 oz) 98%    BMI (Body Mass Index)                   23.6 kg/m2           [employer] Information     [facility] gives you secure access to your electronic health record. If you see a primary care provider, you can also send messages to your care team and make appointments. If you have questions, please call your primary care clinic.  If you do not have a primary care provider, please call [phone] and they will assist you.        Care EveryWhere ID     This is your Care EveryWhere ID. This could be used by other organizations to access your [facility] medical records  [id number]        Equal Access to Services     ABHAY HUITRON : Romario Helton, luci suarez, ivan marina. So [facility] [phone].    ATENCIÓN: Si habla español, tiene a muñoz disposición servicios gratuitos de asistencia lingüística. Howard al [phone].    We comply with applicable federal civil rights laws and [place] laws. We do not discriminate on the basis of race, color, national origin, age, disability, sex, sexual orientation, or gender identity.               Review of your medicines      START taking        Dose / Directions    HYDROmorphone 2 MG tablet   Commonly known as:  DILAUDID   Used for:  Acute post-operative pain        Dose:  1-2 mg   Take 0.5-1 tablets (1-2 mg) by mouth every 3 hours as needed for moderate to severe pain   Quantity:  15 tablet   Refills:  0       ibuprofen 400 MG tablet   Commonly known as:  ADVIL/MOTRIN   Used for:  Acute post-operative pain        Dose:  400 mg   Take 1 tablet (400 mg) by mouth every 6 hours as needed for moderate pain   Quantity:  30 tablet   Refills:  0       senna-docusate 8.6-50 MG per tablet   Commonly known  as:  SENOKOT-S;PERICOLACE   Used for:  Incisional hernia, without obstruction or gangrene        Dose:  1-2 tablet   Take 1-2 tablets by mouth 2 times daily   Quantity:  100 tablet   Refills:  0         CONTINUE these medicines which may have CHANGED, or have new prescriptions. If we are uncertain of the size of tablets/capsules you have at home, strength may be listed as something that might have changed.        Dose / Directions    amitriptyline 50 MG tablet   Commonly known as:  ELAVIL   This may have changed:  Another medication with the same name was removed. Continue taking this medication, and follow the directions you see here.   Used for:  Headache disorder        Dose:  50 mg   Take 1 tablet (50 mg) by mouth At Bedtime   Quantity:  60 tablet   Refills:  3         CONTINUE these medicines which have NOT CHANGED        Dose / Directions    acetaminophen 500 MG Caps        Dose:  2 mg   Take 2 mg by mouth 2 times daily   Refills:  0       amylase-lipase-protease 33436-09962 units Cpep per EC capsule   Commonly known as:  CREON 24   Used for:  Acquired total absence of pancreas        Take 3-4 capsules by mouth with meals and 1-2 with snacks. Maximum 15 capsules per day.   Quantity:  450 capsule   Refills:  6       aspirin 81 MG EC tablet   Used for:  High platelet count (H)        Dose:  81 mg   Take 1 tablet (81 mg) by mouth daily   Quantity:  90 tablet   Refills:  3       azelastine 0.1 % nasal spray   Commonly known as:  ASTELIN        Dose:  1 spray   Spray 1 spray into both nostrils 2 times daily   Refills:  0       BD SHARPS  Misc   Used for:  Post-pancreatectomy diabetes (H)        Dose:  1 Container   1 Container as needed   Quantity:  1 each   Refills:  1       blood glucose monitoring lancets   Used for:  Acute on chronic pancreatitis (H)        Use to test blood sugar 6-8 times daily or as directed.   Quantity:  1 Box   Refills:  prn       blood glucose monitoring test strip   Commonly  known as:  PAT CONTOUR NEXT   Used for:  Post-pancreatectomy diabetes (H)        Use to test blood sugar 6 times daily or as directed.   Quantity:  2 Box   Refills:  11       cetirizine 10 MG tablet   Commonly known as:  zyrTEC   Used for:  Elevated liver enzymes        Dose:  10 mg   Take 10 mg by mouth daily   Refills:  0       diphenhydrAMINE 25 MG capsule   Commonly known as:  BENADRYL ALLERGY   Used for:  Itching, Post-pancreatectomy diabetes (H), History of pancreatectomy, Pancreatic insufficiency        Dose:  25 mg   Take 1 capsule (25 mg) by mouth every 6 hours as needed for itching or allergies   Quantity:  56 capsule   Refills:  0       docusate sodium 100 MG capsule   Commonly known as:  COLACE   Used for:  Itching, Post-pancreatectomy diabetes (H), History of pancreatectomy, Pancreatic insufficiency        Take 1 capsule (100 mg) by mouth 2 times daily as needed for constipation,   Quantity:  60 capsule   Refills:  0       FOLIC ACID PO        Dose:  1 mg   Take 1 mg by mouth daily   Refills:  0       glucose 40 % (400 mg/mL) Gel gel   Used for:  Acquired total absence of pancreas        Dose:  15-30 g   Take 15-30 g by mouth every 15 minutes as needed for low blood sugar   Quantity:  3 Tube   Refills:  2       LORazepam 1 MG tablet   Commonly known as:  ATIVAN   Used for:  Adjustment disorder with anxious mood        Dose:  1 mg   Take 1 tablet (1 mg) by mouth every 6 hours as needed for anxiety   Quantity:  10 tablet   Refills:  0       Lubiprostone 8 MCG Caps capsule   Commonly known as:  AMITIZA   Used for:  Other constipation        Dose:  5 capsule   Take 5 capsules (40 mcg) by mouth daily Take 3 capsules in AM and 2 capsules in PM   Quantity:  150 capsule   Refills:  3       multivitamin CF formula capsule   Commonly known as:  CHOICEFUL   Used for:  Itching, Post-pancreatectomy diabetes (H), History of pancreatectomy, Pancreatic insufficiency        Dose:  1 tablet   Take 1 tablet by mouth  "daily   Refills:  11       order for DME   Used for:  Headache disorder        Equipment being ordered:Cefaly   Quantity:  1 Device   Refills:  0       prochlorperazine 5 MG tablet   Commonly known as:  COMPAZINE   Used for:  Itching, Post-pancreatectomy diabetes (H), History of pancreatectomy, Pancreatic insufficiency        Take two 5 mg tablets by mouth every six hours as needed for nausea.   Quantity:  90 tablet   Refills:  3       ranitidine 150 MG tablet   Commonly known as:  ZANTAC   Used for:  Gastroesophageal reflux disease without esophagitis        Dose:  150 mg   Take 1 tablet (150 mg) by mouth At Bedtime   Quantity:  90 tablet   Refills:  3       SYNTHROID 137 MCG tablet   Generic drug:  levothyroxine        Dose:  137 mcg   Take 137 mcg by mouth daily   Refills:  0       VAGIFEM 10 MCG Tabs vaginal tablet   Generic drug:  estradiol        Refills:  0       ZOMIG 5 MG tablet   Generic drug:  ZOLMitriptan        Dose:  5 mg   Take 5 mg by mouth at onset of headache for migraine   Refills:  0            Where to get your medicines      These medications were sent to [facility] [address] [address]     Phone:  [phone]     ibuprofen 400 MG tablet    senna-docusate 8.6-50 MG per tablet         Some of these will need a paper prescription and others can be bought over the counter. Ask your nurse if you have questions.     Bring a paper prescription for each of these medications     HYDROmorphone 2 MG tablet    LORazepam 1 MG tablet                Protect others around you: Learn how to safely use, store and throw away your medicines at www.disposemymeds.org.        Information about your nerve block     Today you received a block to numb the nerves near your surgery site.    This is a block using local anesthetic or \"numbing\" medication injected around the nerves to anesthetize or \"numb\" the area supplied by those nerves. " This block is injected into the muscle layer near your surgical site. The type of anesthesia (Exparel) your anesthesia team used to numb your abdomen may give you relief for up to 72 hours.     Diet: There are no diet restrictions, but you should drink plenty of fluids, unless you are on a fluid-restricted diet.     Activity: If your surgical site is an arm or leg you should be careful with your affected limb, since it is possible to injure your limb without being aware of it due to the numbing. Until full feeling returns, you should guard against bumping or hitting your limb, and avoid extreme hot or cold temperatures on the skin.    Pain Medication: As the block wears off, the feeling will return as a tingling or prickly sensation near your surgical site. You will experience more discomfort from your incisions as the feeling returns. You may want to take a pain pill (a narcotic or Tylenol if this was prescribed by your surgeon) when you start to experience mild pain, before the pain becomes more severe. If your pain medications do not control your pain, you should notify your surgeon. If you are taking narcotics for pain management, do not drink alcohol, drive a car, or perform hazardous activities.  If you have questions or concerns you may call your surgeon at the number provided with your discharge instructions.     Call your surgeon if you experience blurry vision, ringing in the ears or metallic taste in your mouth.         Information about OPIOIDS     PRESCRIPTION OPIOIDS: WHAT YOU NEED TO KNOW   We gave you an opioid (narcotic) pain medicine. It is important to manage your pain, but opioids are not always the best choice. You should first try all the other options your care team gave you. Take this medicine for as short a time (and as few doses) as possible.    Some activities can increase your pain, such as bandage changes or therapy sessions. It may help to take your pain medicine 30 to 60 minutes before  these activities. Reduce your stress by getting enough sleep, working on hobbies you enjoy and practicing relaxation or meditation. Talk to your care team about ways to manage your pain beyond prescription opioids.    These medicines have risks:    DO NOT drive when on new or higher doses of pain medicine. These medicines can affect your alertness and reaction times, and you could be arrested for driving under the influence (DUI). If you need to use opioids long-term, talk to your care team about driving.    DO NOT operate heavy machinery    DO NOT do any other dangerous activities while taking these medicines.    DO NOT drink any alcohol while taking these medicines.     If the opioid prescribed includes acetaminophen, DO NOT take with any other medicines that contain acetaminophen. Read all labels carefully. Look for the word  acetaminophen  or  Tylenol.  Ask your pharmacist if you have questions or are unsure.    You can get addicted to pain medicines, especially if you have a history of addiction (chemical, alcohol or substance dependence). Talk to your care team about ways to reduce this risk.    All opioids tend to cause constipation. Drink plenty of water and eat foods that have a lot of fiber, such as fruits, vegetables, prune juice, apple juice and high-fiber cereal. Take a laxative (Miralax, milk of magnesia, Colace, Senna) if you don t move your bowels at least every other day. Other side effects include upset stomach, sleepiness, dizziness, throwing up, tolerance (needing more of the medicine to have the same effect), physical dependence and slowed breathing.    Store your pills in a secure place, locked if possible. We will not replace any lost or stolen medicine. If you don t finish your medicine, please throw away (dispose) as directed by your pharmacist. The [place] Pollution Control Agency has more information about safe disposal:  https://www.pca.[facility].mn.us/living-green/managing-unwanted-medications             Medication List: This is a list of all your medications and when to take them. Check marks below indicate your daily home schedule. Keep this list as a reference.      Medications           Morning Afternoon Evening Bedtime As Needed    acetaminophen 500 MG Caps   Take 2 mg by mouth 2 times daily                                amitriptyline 50 MG tablet   Commonly known as:  ELAVIL   Take 1 tablet (50 mg) by mouth At Bedtime   Last time this was given:  50 mg on 8/23/2018  8:52 PM                                amylase-lipase-protease 27741-38013 units Cpep per EC capsule   Commonly known as:  CREON 24   Take 3-4 capsules by mouth with meals and 1-2 with snacks. Maximum 15 capsules per day.   Last time this was given:  72,000 Units on 8/24/2018 11:54 AM                                aspirin 81 MG EC tablet   Take 1 tablet (81 mg) by mouth daily   Last time this was given:  81 mg on 8/24/2018  7:37 AM                                azelastine 0.1 % nasal spray   Commonly known as:  ASTELIN   Spray 1 spray into both nostrils 2 times daily                                BD SHARPS  Misc   1 Container as needed                                blood glucose monitoring lancets   Use to test blood sugar 6-8 times daily or as directed.                                blood glucose monitoring test strip   Commonly known as:  PAT CONTOUR NEXT   Use to test blood sugar 6 times daily or as directed.                                cetirizine 10 MG tablet   Commonly known as:  zyrTEC   Take 10 mg by mouth daily                                diphenhydrAMINE 25 MG capsule   Commonly known as:  BENADRYL ALLERGY   Take 1 capsule (25 mg) by mouth every 6 hours as needed for itching or allergies   Last time this was given:  25 mg on 8/24/2018 10:30 AM                                docusate sodium 100 MG capsule   Commonly known as:  COLACE    Take 1 capsule (100 mg) by mouth 2 times daily as needed for constipation,   Last time this was given:  100 mg on 8/24/2018 10:30 AM                                FOLIC ACID PO   Take 1 mg by mouth daily                                glucose 40 % (400 mg/mL) Gel gel   Take 15-30 g by mouth every 15 minutes as needed for low blood sugar                                HYDROmorphone 2 MG tablet   Commonly known as:  DILAUDID   Take 0.5-1 tablets (1-2 mg) by mouth every 3 hours as needed for moderate to severe pain   Last time this was given:  1 mg on 8/24/2018  2:23 PM                                ibuprofen 400 MG tablet   Commonly known as:  ADVIL/MOTRIN   Take 1 tablet (400 mg) by mouth every 6 hours as needed for moderate pain                                LORazepam 1 MG tablet   Commonly known as:  ATIVAN   Take 1 tablet (1 mg) by mouth every 6 hours as needed for anxiety                                Lubiprostone 8 MCG Caps capsule   Commonly known as:  AMITIZA   Take 5 capsules (40 mcg) by mouth daily Take 3 capsules in AM and 2 capsules in PM   Last time this was given:  3 capsules on 8/23/2018  5:16 PM                                multivitamin CF formula capsule   Commonly known as:  CHOICEFUL   Take 1 tablet by mouth daily                                order for DME   Equipment being ordered:Cefaly                                prochlorperazine 5 MG tablet   Commonly known as:  COMPAZINE   Take two 5 mg tablets by mouth every six hours as needed for nausea.   Last time this was given:  5 mg on 8/23/2018  1:43 PM                                ranitidine 150 MG tablet   Commonly known as:  ZANTAC   Take 1 tablet (150 mg) by mouth At Bedtime   Last time this was given:  150 mg on 8/23/2018  8:52 PM                                senna-docusate 8.6-50 MG per tablet   Commonly known as:  SENOKOT-S;PERICOLACE   Take 1-2 tablets by mouth 2 times daily   Last time this was given:  2 tablets on 8/24/2018   7:36 AM                                SYNTHROID 137 MCG tablet   Take 137 mcg by mouth daily   Last time this was given:  137 mcg on 8/24/2018  7:36 AM   Generic drug:  levothyroxine                                VAGIFEM 10 MCG Tabs vaginal tablet   Generic drug:  estradiol                                ZOMIG 5 MG tablet   Take 5 mg by mouth at onset of headache for migraine   Last time this was given:  5 mg on 8/23/2018 10:59 PM   Generic drug:  ZOLMitriptan

## 2018-08-23 NOTE — ANESTHESIA PROCEDURE NOTES
Peripheral Nerve Block Procedure Note    Staff:     Anesthesiologist:  ARELY CROWE    Resident/CRNA:  RINA FAROOQ    Block performed by resident/CRNA in the presence of a teaching physician    Location: Pre-op  Procedure Start/Stop TImes:      8/23/2018 6:35 AM     8/23/2018 6:47 AM    patient identified, IV checked, site marked, risks and benefits discussed, informed consent, monitors and equipment checked, pre-op evaluation, at physician/surgeon's request and post-op pain management      Correct Patient: Yes      Correct Position: Yes      Correct Site: Yes      Correct Procedure: Yes      Correct Laterality:  Yes    Site Marked:  Yes  Procedure details:     Procedure:  TAP    ASA:  3    Diagnosis:  Post operative pain control    Laterality:  Bilateral    Position:  Supine    Sterile Prep: chloraprep, mask and sterile gloves      Needle:  Short bevel    Needle gauge:  21    Needle length (mm):  110    Ultrasound: Yes      Ultrasound used to identify targeted nerve, plexus, or vascular structure and placed a needle adjacent to it      Permanent Image entered into patiient's record      Abnormal pain on injection: No      Blood Aspirated: No      Paresthesias:  No    Bleeding at site: No      Infusion Method:  Single Shot    Complications:  None

## 2018-08-23 NOTE — OP NOTE
Transplant Surgery  Operative Note    Preop Dx:  Symptomatic Incisional Hernia  Postop Dx: Same  Procedure: Laparoscopic incisional hernia repair with mesh  Surgeon: Nestor Phoenix M.D., M.S.  ASSISTANT:  Joselo Evans fellow. There was no qualified general surgery resident available to assist during this procedure.   Anesthesia: General  EBL: 15 ml  Fluids: 800 ml  UO: 890 ml  Drains: no drain  Specimen: none.  Complications: None  Findings:  2 cm umbilical hernia, omentum adherent to anterior abdominal wall  Complications: None.    Indication: The patient has had a TPAIT in 2016. She complains of umbilical pain and a mass getting larger with physical activity. After discussing the risks and benefits of surgery and potential complications, the patient provided informed consent.     DETAILS OF PROCEDURE:  The patient was brought to the operating room, placed in a supine position.  Perioperative prophylactic IV antibiotics were given.  Anesthesia was adminisitered. The abdomen was prepped and draped in the usual sterile fashion.  Time out was performed.    Under general anesthesia, a LUQ incision was made and a Veress needle was inserted and pneumoperitoneum was established using carbon dioxide gas to a maximum pressure of 15 mmHg. A 10 mm port was inserted. 3 additional 5 mm ports were placed under direct vision. Abdominal inspection revealed transverse colon and omentum adherent to anterior abdominal wall. The hernial defect was inspected and measured to be 2 cm in diameter close to umbilicum with an increase area of transillumination just cephalad to the umbilicus. Therefore we planned to cover the defect with a 12 cm round Parietex mesh to provide good coverage of the defect and potential defect. The mesh was prepared after placing 1-0 PDS stitches in the mesh at 3,6,9, and 12 o'clock. Skin was marked in the areas identified to fix the mesh on to the fascia. The mesh was then rolled and introduced through the 10 mm  port and positioned in place. Stab incisons were placed on the marked areas on the skin and a PMI grasper was used to grab the stay sutures on the mesh and pulled through the stab incision in the skin. We were satisfied with the lay of the mesh when the sutures were tied. Additional absorbable tackers were placed circumferentially and in a quilting pattern to affix the mesh to the fascia. The mesh lay was good. Hemostasis was confirmed. The abdomen was defalted and all ports were removed under direct vision. All skin incisions were closed using 4-0 monocryl suture and dermabond was applied. Faculty was present for all critical components of the operation    All needle, sponge, and instrument counts were accurate.  The patient tolerated the procedure well without apparent complications and was trasfered to the PACU in good condition.  Faculty was present for critical portions of the procedure.    Physician Attestation   I was present for the entire procedure between opening and closing.    Nestor Phoenix  Date of Service (when I saw the patient): 08/23/18

## 2018-08-24 VITALS
BODY MASS INDEX: 23.6 KG/M2 | DIASTOLIC BLOOD PRESSURE: 50 MMHG | WEIGHT: 155.2 LBS | TEMPERATURE: 99.1 F | OXYGEN SATURATION: 98 % | SYSTOLIC BLOOD PRESSURE: 97 MMHG | RESPIRATION RATE: 16 BRPM | HEART RATE: 75 BPM

## 2018-08-24 LAB
GLUCOSE BLDC GLUCOMTR-MCNC: 85 MG/DL (ref 70–99)
HCT VFR BLD AUTO: 41.7 % (ref 35–47)
HGB BLD-MCNC: 13.5 G/DL (ref 11.7–15.7)

## 2018-08-24 PROCEDURE — G0378 HOSPITAL OBSERVATION PER HR: HCPCS

## 2018-08-24 PROCEDURE — 25000128 H RX IP 250 OP 636: Performed by: SURGERY

## 2018-08-24 PROCEDURE — 00000146 ZZHCL STATISTIC GLUCOSE BY METER IP

## 2018-08-24 PROCEDURE — 85018 HEMOGLOBIN: CPT | Performed by: STUDENT IN AN ORGANIZED HEALTH CARE EDUCATION/TRAINING PROGRAM

## 2018-08-24 PROCEDURE — 25000132 ZZH RX MED GY IP 250 OP 250 PS 637: Performed by: STUDENT IN AN ORGANIZED HEALTH CARE EDUCATION/TRAINING PROGRAM

## 2018-08-24 PROCEDURE — 25000132 ZZH RX MED GY IP 250 OP 250 PS 637: Performed by: PHYSICIAN ASSISTANT

## 2018-08-24 PROCEDURE — 25000128 H RX IP 250 OP 636: Performed by: PHYSICIAN ASSISTANT

## 2018-08-24 PROCEDURE — 99231 SBSQ HOSP IP/OBS SF/LOW 25: CPT | Performed by: NURSE PRACTITIONER

## 2018-08-24 PROCEDURE — 85014 HEMATOCRIT: CPT | Performed by: STUDENT IN AN ORGANIZED HEALTH CARE EDUCATION/TRAINING PROGRAM

## 2018-08-24 PROCEDURE — 25000128 H RX IP 250 OP 636: Performed by: STUDENT IN AN ORGANIZED HEALTH CARE EDUCATION/TRAINING PROGRAM

## 2018-08-24 PROCEDURE — 36415 COLL VENOUS BLD VENIPUNCTURE: CPT | Performed by: STUDENT IN AN ORGANIZED HEALTH CARE EDUCATION/TRAINING PROGRAM

## 2018-08-24 RX ORDER — LORAZEPAM 1 MG/1
1 TABLET ORAL EVERY 6 HOURS PRN
Qty: 10 TABLET | Refills: 0 | Status: SHIPPED | OUTPATIENT
Start: 2018-08-24 | End: 2019-02-19

## 2018-08-24 RX ORDER — HYDROMORPHONE HYDROCHLORIDE 2 MG/1
1-2 TABLET ORAL
Qty: 15 TABLET | Refills: 0 | Status: SHIPPED | OUTPATIENT
Start: 2018-08-24 | End: 2019-02-19

## 2018-08-24 RX ORDER — DOCUSATE SODIUM 100 MG/1
100 CAPSULE, LIQUID FILLED ORAL 2 TIMES DAILY
Status: DISCONTINUED | OUTPATIENT
Start: 2018-08-24 | End: 2018-08-24 | Stop reason: HOSPADM

## 2018-08-24 RX ORDER — IBUPROFEN 200 MG
400 TABLET ORAL EVERY 6 HOURS PRN
Status: DISCONTINUED | OUTPATIENT
Start: 2018-08-24 | End: 2018-08-24 | Stop reason: HOSPADM

## 2018-08-24 RX ORDER — IBUPROFEN 400 MG/1
400 TABLET, FILM COATED ORAL EVERY 6 HOURS PRN
Qty: 30 TABLET | Refills: 0 | Status: ON HOLD | OUTPATIENT
Start: 2018-08-24 | End: 2023-06-07

## 2018-08-24 RX ORDER — AMOXICILLIN 250 MG
1-2 CAPSULE ORAL 2 TIMES DAILY
Qty: 100 TABLET | Refills: 0 | Status: SHIPPED | OUTPATIENT
Start: 2018-08-24 | End: 2022-09-08

## 2018-08-24 RX ADMIN — Medication 2 MG: at 17:09

## 2018-08-24 RX ADMIN — PANCRELIPASE 72000 UNITS: 24000; 76000; 120000 CAPSULE, DELAYED RELEASE PELLETS ORAL at 11:54

## 2018-08-24 RX ADMIN — Medication 2 MG: at 07:36

## 2018-08-24 RX ADMIN — SENNOSIDES AND DOCUSATE SODIUM 2 TABLET: 8.6; 5 TABLET ORAL at 07:36

## 2018-08-24 RX ADMIN — DIPHENHYDRAMINE HYDROCHLORIDE 25 MG: 25 CAPSULE ORAL at 10:30

## 2018-08-24 RX ADMIN — Medication 1 MG: at 12:51

## 2018-08-24 RX ADMIN — ASPIRIN 81 MG: 81 TABLET, COATED ORAL at 07:37

## 2018-08-24 RX ADMIN — KETOROLAC TROMETHAMINE 30 MG: 30 INJECTION, SOLUTION INTRAMUSCULAR at 11:54

## 2018-08-24 RX ADMIN — SODIUM CHLORIDE, POTASSIUM CHLORIDE, SODIUM LACTATE AND CALCIUM CHLORIDE: 600; 310; 30; 20 INJECTION, SOLUTION INTRAVENOUS at 09:02

## 2018-08-24 RX ADMIN — Medication 2 MG: at 02:06

## 2018-08-24 RX ADMIN — DOCUSATE SODIUM 100 MG: 100 CAPSULE, LIQUID FILLED ORAL at 10:30

## 2018-08-24 RX ADMIN — Medication 1 CAPSULE: at 07:36

## 2018-08-24 RX ADMIN — SODIUM CHLORIDE 500 ML: 9 INJECTION, SOLUTION INTRAVENOUS at 10:31

## 2018-08-24 RX ADMIN — PANCRELIPASE 72000 UNITS: 24000; 76000; 120000 CAPSULE, DELAYED RELEASE PELLETS ORAL at 07:36

## 2018-08-24 RX ADMIN — LEVOTHYROXINE SODIUM 137 MCG: 25 TABLET ORAL at 07:36

## 2018-08-24 RX ADMIN — Medication 1 MG: at 14:23

## 2018-08-24 RX ADMIN — KETOROLAC TROMETHAMINE 30 MG: 30 INJECTION, SOLUTION INTRAMUSCULAR at 06:02

## 2018-08-24 ASSESSMENT — ACTIVITIES OF DAILY LIVING (ADL)
ADLS_ACUITY_SCORE: 11

## 2018-08-24 ASSESSMENT — PAIN DESCRIPTION - DESCRIPTORS
DESCRIPTORS: ACHING
DESCRIPTORS: SHARP
DESCRIPTORS: ACHING
DESCRIPTORS: SHARP
DESCRIPTORS: SHARP
DESCRIPTORS: ACHING

## 2018-08-24 NOTE — PLAN OF CARE
Problem: Patient Care Overview  Goal: Plan of Care/Patient Progress Review  Outcome: Adequate for Discharge Date Met: 08/24/18  VSS. Pain managed with PO dilaudid, Toradol, and cold packs. PIV infusing. Passing flatus. Ambulating independently and voiding spontaneously. Tolerating diet. Requesting to discharge today. Waiting for orders,     Adequate for discharge. Discharge education and paperwork complete, no further questions. All belongings with patient. PIV removed. Pt refused transport and left unit at 1715 for discharge pharmacy and then lobby.

## 2018-08-24 NOTE — PROGRESS NOTES
REGIONAL ANESTHESIA PAIN SERVICE  SUBJECTIVE  Interval history: Patient reports pain much better than after TPIAT, but only moderately controlled with current analgesics (see below) and nerve block.  Currently rating pain 8/10 at rest and 8/10 with activity.  Tolerating regular diet,  denies nausea.  Ambulating in halls today.  Denies any weakness, paresthesias, circumoral numbness, metallic taste or tinnitus.  voiding without difficulty.      Medications related to Pain Management (Future)    Start     Dose/Rate Route Frequency Ordered Stop    08/24/18 1800  ibuprofen (ADVIL/MOTRIN) tablet 400 mg      400 mg Oral EVERY 6 HOURS PRN 08/24/18 1156      08/24/18 1030  docusate sodium (COLACE) capsule 100 mg      100 mg Oral 2 TIMES DAILY 08/24/18 1023      08/24/18 0000  senna-docusate (SENOKOT-S;PERICOLACE) 8.6-50 MG per tablet      1-2 tablet Oral 2 TIMES DAILY 08/24/18 1214      08/24/18 0000  HYDROmorphone (DILAUDID) 2 MG tablet      1-2 mg Oral EVERY 3 HOURS PRN 08/24/18 1214      08/24/18 0000  ibuprofen (ADVIL/MOTRIN) 400 MG tablet      400 mg Oral EVERY 6 HOURS PRN 08/24/18 1214      08/24/18 0000  LORazepam (ATIVAN) 1 MG tablet      1 mg Oral EVERY 6 HOURS PRN 08/24/18 1315      08/23/18 2200  amitriptyline (ELAVIL) tablet 50 mg      50 mg Oral AT BEDTIME 08/23/18 1209      08/23/18 1706  HYDROmorphone (DILAUDID) injection 0.2 mg      0.2 mg Intravenous EVERY 2 HOURS PRN 08/23/18 1706      08/23/18 1706  HYDROmorphone (DILAUDID) half-tab 1-2 mg      1-2 mg Oral EVERY 3 HOURS PRN 08/23/18 1706      08/23/18 1215  aspirin EC tablet 81 mg      81 mg Oral DAILY 08/23/18 1209      08/23/18 1209  LORazepam (ATIVAN) tablet 1 mg      1 mg Oral EVERY 6 HOURS PRN 08/23/18 1209      08/23/18 1209  diphenhydrAMINE (BENADRYL) capsule 25 mg      25 mg Oral EVERY 6 HOURS PRN 08/23/18 1209      08/23/18 1208  bupivacaine liposome (EXPAREL) LONG ACTING injection was administered into the infiltration site to produce  "postsurgical analgesia. Duration of action is up to 72 hours, and other \"sy\" medications should not be given for 96 hours with the exception of the lidocaine 5% patch (LIDODERM) and the lidocaine 10mg in potassium infusions. This entry is for INFORMATION ONLY.       Does not apply CONTINUOUS PRN 08/23/18 1208 08/27/18 1207    08/23/18 1208  senna-docusate (SENOKOT-S;PERICOLACE) 8.6-50 MG per tablet 1 tablet      1 tablet Oral 2 TIMES DAILY 08/23/18 1208      08/23/18 1208  senna-docusate (SENOKOT-S;PERICOLACE) 8.6-50 MG per tablet 2 tablet      2 tablet Oral 2 TIMES DAILY 08/23/18 1208            OBJECTIVE:    /48 (BP Location: Left arm)  Pulse 75  Temp 97  F (36.1  C) (Oral)  Resp 16  Wt 70.4 kg (155 lb 3.2 oz)  SpO2 98%  BMI 23.6 kg/m2  Exam:  General: alert and no distress  Neuro: Strength 5/5 BLE    ASSESSMENT/PLAN:    Dinora Mcghee is a 53 year old female with incisional hernia, now POD #1 s/p  LAPAROSCOPIC HERNIORRHAPHY INCISIONAL with single shot injection bilateral transversus abdominis plane (TAP) nerve block.  Total bupivacaine 0.25% 20 mL and liposomal (long-acting) bupivacaine (Exparel) 1.3% 20 mL administered 8/23/18 for postop pain control.  Pt is ambulating without difficulty.  No weakness or paresthesias.  No evidence of adverse side effects associated with nerve block injections.  Pt acheiving moderate pain control, with nerve block, opioid and nonopioid analgesics.  Anticipate 48-72 hours of pain control with long-acting local anesthetic. Additionally, pt will continue to require multimodal analgesia for visceral/muscle/musculoskeletal pain not controlled with long-acting local anesthetic.      - NO other local anesthetic use within 96 hours of liposomal bupivacaine (Exparel), unless approved by [facility]  - patient received verbal and written instructions about liposomal bupivacaine and counseling about pharmacologic and nonpharmacologic measures for acute postoperative pain " management  - please call [facility] if questions or concerns    NATALY Paul [facility]  Regional Anesthesia Pain Service ([facility])  8/24/2018 1:43 PM    [facility] Contact Info (for in-house use only):  Job code ID: [id number]   [employer] [id number]  [facility] 0602  [employer] phone: dial [phone], enter jobcode ID, then enter call-back number.    Text: Use [employer] on the Intranet <Paging/Directory> tab and enter Jobcode ID.   If no call back at any time, contact the hospital  and ask for [facility] attending or backup

## 2018-08-24 NOTE — PLAN OF CARE
Problem: Patient Care Overview  Goal: Plan of Care/Patient Progress Review  Outcome: Therapy, progress towards functional goals is fair  Pain meds were given around the clock and pain control was better than in the evening, BPs in the upper 80s systolic this morning and CO2 spikes to the lower 50s intermittently, MD notified. Patient is asymptomatic, urine output is adequate, no new orders placed. Migrane headache resolved overnight after prn Zomig was given @[facility]. Incisions CDI, tolerating diet.

## 2018-08-24 NOTE — PLAN OF CARE
Problem: Patient Care Overview  Goal: Plan of Care/Patient Progress Review  Outcome: No Change    POD# 0 s/p Hernioplasty. Patient reports good pain control with PO Dilaudid and Toradol, intermittent nausea with relief after intervention, tolerating regular diet, she ate 80% of her dinner meal. Pt hypotensive during the shift, MD notified and IVF bolus given with good results. Pt has new PIV, IVF infusing. Patient dangled and stood at bedside, up in chair x 1, ambulated in the hallway, pt tolerated well. Piedra catheter patent with good urine output. Encouraged to use IS, PCDs in place, pt on continuous capnography,call light in reach.     Update: Admission profile completed.

## 2018-08-24 NOTE — DISCHARGE SUMMARY
[facility], [place]    Discharge Summary  Transplant Surgery    Date of Admission:  8/23/2018  Date of Discharge:  8/24/2018  5:30 PM  Discharging Provider: Altagracia Rush  Date of Service (when I saw the patient): 8/24/18    Discharge Diagnoses   Active Problems:    S/P hernia repair      Procedure/Surgery Information   Procedure: Procedure(s):  Laparoscopic Incisional Hernia Repair with Symbotex Mesh Implant - Wound Class: I-Clean   Surgeon(s): Surgeon(s) and Role:     * Nestor Phoenix MD - Primary     * Joselo Evans MD - Assisting         History of Present Illness   Dinora Mcghee is a 53 year old female who presents for a laparoscopic incisional hernia repair with mesh.    Hospital Course   Dinora Mcghee underwent a laparoscopic incisional hernia repair with mesh on 8/23/2018.  She was admitted afterwards for post-op monitoring.  By the time of discharge, she had adequate pain control on oral medications, was voiding s/p rose removal, ambulating well and passing flatus.    Post-operative pain control: included oral dialudid, ibuprofen and tylenol on discharge    Discharge Disposition   Discharged to home   Condition at discharge: Stable    Pending Results   None    Primary Care Physician   Latasha Paul    Physical Exam                      Vitals:    08/24/18 0930   Weight: 70.4 kg (155 lb 3.2 oz)     Constitutional: Awake, alert, cooperative, no apparent distress, and appears stated age.  Eyes: Lids and lashes normal, pupils equal, round and reactive to light, extra ocular muscles intact, sclera clear, conjunctiva normal.  ENT: Normocephalic, without obvious abnormality, atraumatic  Respiratory: Nonlabored resps on RA  Cardiovascular: RRR  GI: Soft, non-distended, incisions c/d/i, healing well  Genitourinary:  Voiding prior to discharge  Skin: No bruising or bleeding, normal skin color, texture  Musculoskeletal: There is no redness, warmth, or swelling of  the joints.   Neurologic: Awake, alert, oriented to name, place and time.   Neuropsychiatric: Calm, normal eye contact, alert, normal affect, oriented to self, place, time and situation, memory for past and recent events intact and thought process normal.    Consultations This Hospital Stay   ANESTHESIOLOGY IP CONSULT  PHARMACY IP CONSULT  VASCULAR ACCESS CARE ADULT IP CONSULT    Time Spent on this Encounter   I have spent less than 30 minutes on this discharge.    I evaluated the patient today.  I reviewed the discharge plan with the fellow, and agree with the final assessment and plan for discharge as noted in the discharge summary.  I personally spent  30 minutes or less  today on discharge activities for this patient.            Nestor Phoenix MD  Department of Surgery      Discharge Orders   Discharge Medications   Current Discharge Medication List      START taking these medications    Details   HYDROmorphone (DILAUDID) 2 MG tablet Take 0.5-1 tablets (1-2 mg) by mouth every 3 hours as needed for moderate to severe pain  Qty: 15 tablet, Refills: 0    Associated Diagnoses: Acute post-operative pain      ibuprofen (ADVIL/MOTRIN) 400 MG tablet Take 1 tablet (400 mg) by mouth every 6 hours as needed for moderate pain  Qty: 30 tablet, Refills: 0    Associated Diagnoses: Acute post-operative pain      senna-docusate (SENOKOT-S;PERICOLACE) 8.6-50 MG per tablet Take 1-2 tablets by mouth 2 times daily  Qty: 100 tablet, Refills: 0    Associated Diagnoses: Incisional hernia, without obstruction or gangrene         CONTINUE these medications which have CHANGED    Details   LORazepam (ATIVAN) 1 MG tablet Take 1 tablet (1 mg) by mouth every 6 hours as needed for anxiety  Qty: 10 tablet, Refills: 0    Associated Diagnoses: Adjustment disorder with anxious mood         CONTINUE these medications which have NOT CHANGED    Details   acetaminophen 500 MG CAPS Take 2 mg by mouth 2 times daily      amitriptyline (ELAVIL) 50 MG tablet  Take 1 tablet (50 mg) by mouth At Bedtime  Qty: 60 tablet, Refills: 3    Associated Diagnoses: Headache disorder      amylase-lipase-protease (CREON 24) 13167-52982 UNITS CPEP per EC capsule Take 3-4 capsules by mouth with meals and 1-2 with snacks. Maximum 15 capsules per day.  Qty: 450 capsule, Refills: 6    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      azelastine (ASTELIN) 0.1 % spray Spray 1 spray into both nostrils 2 times daily      docusate sodium (COLACE) 100 MG capsule Take 1 capsule (100 mg) by mouth 2 times daily as needed for constipation,  Qty: 60 capsule, Refills: 0    Associated Diagnoses: Itching; Post-pancreatectomy diabetes (H); History of pancreatectomy; Pancreatic insufficiency      estradiol (VAGIFEM) 10 MCG TABS vaginal tablet       FOLIC ACID PO Take 1 mg by mouth daily      levothyroxine (SYNTHROID) 137 MCG tablet Take 137 mcg by mouth daily      Lubiprostone (AMITIZA) 8 MCG CAPS capsule Take 5 capsules (40 mcg) by mouth daily Take 3 capsules in AM and 2 capsules in PM  Qty: 150 capsule, Refills: 3    Associated Diagnoses: Other constipation      multivitamin CF formula (CHOICEFUL) capsule Take 1 tablet by mouth daily  Refills: 11    Associated Diagnoses: Itching; Post-pancreatectomy diabetes (H); History of pancreatectomy; Pancreatic insufficiency      ranitidine (ZANTAC) 150 MG tablet Take 1 tablet (150 mg) by mouth At Bedtime  Qty: 90 tablet, Refills: 3    Associated Diagnoses: Gastroesophageal reflux disease without esophagitis      aspirin 81 MG EC tablet Take 1 tablet (81 mg) by mouth daily  Qty: 90 tablet, Refills: 3    Associated Diagnoses: High platelet count (H)      blood glucose monitoring (PAT CONTOUR NEXT) test strip Use to test blood sugar 6 times daily or as directed.  Qty: 2 Box, Refills: 11    Associated Diagnoses: Post-pancreatectomy diabetes (H)      blood glucose monitoring (PAT MICROLET) lancets Use to test blood sugar 6-8 times daily or as  directed.  Qty: 1 Box, Refills: prn    Associated Diagnoses: Acute on chronic pancreatitis (H)      cetirizine (ZYRTEC) 10 MG tablet Take 10 mg by mouth daily    Associated Diagnoses: Elevated liver enzymes      diphenhydrAMINE (BENADRYL ALLERGY) 25 MG capsule Take 1 capsule (25 mg) by mouth every 6 hours as needed for itching or allergies  Qty: 56 capsule, Refills: 0    Associated Diagnoses: Itching; Post-pancreatectomy diabetes (H); History of pancreatectomy; Pancreatic insufficiency      glucose 40 % GEL gel Take 15-30 g by mouth every 15 minutes as needed for low blood sugar  Qty: 3 Tube, Refills: 2    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  Qty: 90 tablet, Refills: 3    Associated Diagnoses: Itching; Post-pancreatectomy diabetes (H); History of pancreatectomy; Pancreatic insufficiency      Sharps Container (BD SHARPS ) MISC 1 Container as needed  Qty: 1 each, Refills: 1    Associated Diagnoses: Post-pancreatectomy diabetes (H)      ZOLMitriptan (ZOMIG) 5 MG tablet Take 5 mg by mouth at onset of headache for migraine               Reason for your hospital stay   Patient underwent a laparoscopic incisional hernia with mesh on 8/23/18.     Adult [facility] Follow-up and recommended labs and tests   Resume previously scheduled appointments with GI.    Will not schedule routine post-op follow up, If not healing well, or increased pain call your coordinator to see Dr. Honeycutt in clinic.    Appointments on [place] and/or [facility] (with [facility] or [facility] provider or service). Call [phone] if you haven't heard regarding these appointments within 7 days of discharge.     Activity   Your activity upon discharge: Walk at least four times a day, lift no  greater than 10 pounds for 6 weeks from the time of surgery.  After 6 weeks time please return to your normal exercise routine.  No driving while taking narcotics or until 3 weeks after surgery.     When to contact your care team   Call your transplant coordinator at [phone] if you have any of the following: sustained temperature greater than 100.4 or isolated fever spike to 101.5, increased pain or redness at surgical site.    If it is outside of office hours, please call the hospital switchboard at [phone] and ask to have the transplant surgery fellow paged for urgent medical questions.     Wound care and dressings   Instructions to care for your wound at home: Keep wound clean and dry.  Pt may shower but do not soak incision in pool, hot tub, or bath for 3 weeks     Diet   Follow this diet upon discharge: Regular Diet Adult

## 2018-09-04 ENCOUNTER — TELEPHONE (OUTPATIENT)
Dept: GASTROENTEROLOGY | Facility: CLINIC | Age: 53
End: 2018-09-04

## 2018-09-04 ASSESSMENT — ENCOUNTER SYMPTOMS
ALTERED TEMPERATURE REGULATION: 1
WEIGHT GAIN: 0
DIARRHEA: 0
EYE REDNESS: 0
PALPITATIONS: 0
TINGLING: 0
EXERCISE INTOLERANCE: 0
DIZZINESS: 0
TREMORS: 0
NERVOUS/ANXIOUS: 1
BLOATING: 1
RECTAL PAIN: 0
LOSS OF CONSCIOUSNESS: 0
EYE WATERING: 0
DECREASED CONCENTRATION: 1
HYPOTENSION: 1
LIGHT-HEADEDNESS: 0
DOUBLE VISION: 0
NIGHT SWEATS: 1
MEMORY LOSS: 0
BOWEL INCONTINENCE: 0
SEIZURES: 0
SPEECH CHANGE: 0
PANIC: 0
FEVER: 0
CHILLS: 0
CONSTIPATION: 0
SLEEP DISTURBANCES DUE TO BREATHING: 0
HEARTBURN: 1
NUMBNESS: 0
HYPERTENSION: 0
VOMITING: 1
DECREASED LIBIDO: 0
HALLUCINATIONS: 0
JAUNDICE: 0
WEIGHT LOSS: 0
DEPRESSION: 1
ABDOMINAL PAIN: 0
SYNCOPE: 0
FATIGUE: 1
INSOMNIA: 1
BLOOD IN STOOL: 0
POLYPHAGIA: 0
ORTHOPNEA: 0
HOT FLASHES: 1
DECREASED APPETITE: 0
EYE PAIN: 0
INCREASED ENERGY: 0
POLYDIPSIA: 0
LEG PAIN: 0
HEADACHES: 1
DISTURBANCES IN COORDINATION: 0
NAUSEA: 1
WEAKNESS: 0
PARALYSIS: 0
EYE IRRITATION: 1

## 2018-09-05 ENCOUNTER — OFFICE VISIT (OUTPATIENT)
Dept: GASTROENTEROLOGY | Facility: CLINIC | Age: 53
End: 2018-09-05
Payer: COMMERCIAL

## 2018-09-05 ENCOUNTER — RADIANT APPOINTMENT (OUTPATIENT)
Dept: MAMMOGRAPHY | Facility: CLINIC | Age: 53
End: 2018-09-05
Payer: COMMERCIAL

## 2018-09-05 VITALS
WEIGHT: 151.4 LBS | SYSTOLIC BLOOD PRESSURE: 110 MMHG | OXYGEN SATURATION: 97 % | RESPIRATION RATE: 16 BRPM | HEIGHT: 68 IN | BODY MASS INDEX: 22.94 KG/M2 | DIASTOLIC BLOOD PRESSURE: 71 MMHG | TEMPERATURE: 98.3 F | HEART RATE: 94 BPM

## 2018-09-05 DIAGNOSIS — R11.2 NON-INTRACTABLE VOMITING WITH NAUSEA, UNSPECIFIED VOMITING TYPE: ICD-10-CM

## 2018-09-05 DIAGNOSIS — K59.00 CONSTIPATION, UNSPECIFIED CONSTIPATION TYPE: ICD-10-CM

## 2018-09-05 DIAGNOSIS — Z12.31 ENCOUNTER FOR SCREENING MAMMOGRAM FOR BREAST CANCER: ICD-10-CM

## 2018-09-05 DIAGNOSIS — R14.0 BLOATING: ICD-10-CM

## 2018-09-05 DIAGNOSIS — R10.84 ABDOMINAL PAIN, GENERALIZED: Primary | ICD-10-CM

## 2018-09-05 DIAGNOSIS — K21.9 GASTROESOPHAGEAL REFLUX DISEASE WITHOUT ESOPHAGITIS: ICD-10-CM

## 2018-09-05 DIAGNOSIS — Z12.31 VISIT FOR SCREENING MAMMOGRAM: ICD-10-CM

## 2018-09-05 RX ORDER — OMEPRAZOLE 40 MG/1
40 CAPSULE, DELAYED RELEASE ORAL DAILY
Qty: 90 CAPSULE | Refills: 3 | Status: SHIPPED | OUTPATIENT
Start: 2018-09-05 | End: 2019-09-17

## 2018-09-05 RX ORDER — AMITRIPTYLINE HYDROCHLORIDE 10 MG/1
TABLET ORAL
Refills: 0 | COMMUNITY
Start: 2018-08-21 | End: 2019-01-29

## 2018-09-05 ASSESSMENT — PAIN SCALES - GENERAL: PAINLEVEL: NO PAIN (0)

## 2018-09-05 NOTE — PROGRESS NOTES
GI CLINIC VISIT    CC:   Follow-up       ASSESSMENT/PLAN:    1. Continue on the low gas diet  2. Continue lifestyle changes to limit aerophagia (no carbonated beverages, no straws, no gum, no hard candy). Eat slowly.   3.Continue your Amitiza  2 tabs in the morning and 3 tabs at night.   4. Continue daily colace.   5. Ok for senna as needed, agree with slowly backing off this medication as able.   6. Continue bisacodyl as needed if three days without a BM.   7. Continue your efforts to have 5-6 small meals/snacks a day.   8. Limit high fiber and high fat loads to your stomach.   9. Continue your pancreatic enzymes.  10. Will restart PPI (omeprazole) given your heartburn symptoms. Stop ranitidine.   11. If having issues with pronounced bloating, increased flatus, fatty stools, etc despite diet changes, contact GI clinic. We can consider whether a course of rifaximin would be useful.   12. Follow-up in GI clinic in 6 months with Dr. Genao.       It was a pleasure to participate in the care of this patient; please contact us with any further questions.  A total of 20 face-to-face minutes was spent with this patient, >50% of which was counseling regarding the above delineated issues.    Vijaya Genao MD   of Medicine  Division of Gastroenterology, Hepatology and Nutrition  [facility] [place]    ---------------------------------------------------------------------------------------------------  HPI:        Hernia repair last week - ended up having to be in hospital for 24 hours due to BP. Was quite sleepy after anesthesia. Now behind on work. Incisions are healing well.  Weight restrictions, so not exercising/daily walking      Ms. 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 She returns today for follow-up.          1. Abdominal pain  Taken/summarized from my prior documentation:    Ms. Loo had issues with severe daily abdominal pain prior to her TPIAT. Overall she felt she was doing well after surgery and was able to be weaned off of narcotics though she states she still follows in the pain clinic. Three or four times since April 2017 (about once every other month), she has experienced pronounced abdominal pain similar in nature to her prior chronic pancreatic pain. The pain is localized in the epigastric region and just under her right 12th rib; the pain does not radiate beyond that point and is described as \"sharp.\" It did wake her from sleep on one occasion prompting an ED visit on 5/20/17. At that time pain was reported in the LUQ and she was seen to on CT have left abdominal wall fat stranding but with no associated cellulitis or intra-abdominal pathology. An MR abd done a month prior demonstrated a subQ fluid collection in this area. She was discharged to home from the ED. An MRI done thereafter in July 2017 did not show persistance of this finding and heterogenous area of enhancement in the dome of the liver (initially seen on 5/20/17 CT) was improved.    She expresses much concern that this is a recurrence of her prior pancreas pain, though she feels it shouldn't happen since \"my pancreas is gone.\" She shares that she has been working with her psychologist in pain clinic on mind-body interventions; they have apparently discussed that this could be a reactivation of prior pain pathways. She cannot provide much more in the way of details of the pain during episodes, what she'd been doing, eating, or how it related to her stool pattern. " "        Ms. Loo also notes a much less pronounced pain which comes on in the same area, but only with intense exercise. This seems to get better with stopping or walking and has not occurred at other times.       At her last visit she reported some improvement in her deep pain with breathing exercises and relaxation techniques that she's been working on. Today, she feels her pain is stable with no marked change.    Today,    abdominal pain is \"zero\" even nothing from her recent surgery          2. Gastroparesis  3. Nausea, vomiting  Taken/summarized from my prior documentation:    GES pursued in the setting of chronic nausea and vomiting during her chronic pancreatitis and TPIAT evaluation. She states she had two-hour study at [facility] that may have been consistent with gastroparesis. A follow-up four-hour study was done here on 6/27/16 with 49% seen remaining at 4 hours. Ms. Loo is not sure if she was on narcotics at that time. Per her recollection she didn't start daily pain medications until Fall 2016, but she did use them intermittently prior to that time. A G-J tube was placed in fall 2016 with G-tube used for venting and jejunal extension for tube feeds. After her TPIAT she was able to wean off her TFs and felt she no longer required venting. By spring 2017 she was tolerating a regular diet with no nausea or vomiting and in March 2017 her G-J tube was able to be removed.  Throughout the spring and summer she was on a regular diet and had no bloating, nausea, or vomiting.     Prior to her initial GI consultation she developed \"severe\" migraine HAs with associated vomiting. She describes 12 hours of emesis and retching with 12+ hours of HA pain similar to her prior migraines. Since then has had frequent HAs with both prolonged migraines (lasting 3 days per report) and frequent migraines (up to 3-5 days out of a week).    She is on chronic amitriptyline for migraine prophylaxis. She has had previously " used sumatriptan for migraines, but this last efficacy and some time ago she was switched to zolmitriptan. Unfortunately she does not feel the zolmitriptan is working any longer. She had not seen a neurologist for several years. For her nausea and vomiting she uses scopolamine patch, procholorperazine, or promethazine all with equal effect. She likes the scopolamine but limits it use due to side effects. We did refer her to neurology for additional evaluation.     At her last GI visit Ms. Loo reported 6 episodes of emesis in the preceding four months, Of these episode at least a few were noted that after large liquid ingestions where she will feel the need to burp and will bring up some of the liquid with eructation.     She was able to see a neurologist recently who recommended a dose increase in her amitriptyline. Ms. Loo feels her migraines have decreased from 10-12 each month to 4-5 in a month.       Today, Maybe vomited three times since last visit. Reports this was mostly with migraines. Migraines much better - now on 60 of amitriptyline. Sleep is still difficult - continues to work with Dr. Canchola of neurology and Maria Elena Abdalla psych with pain clinic on this. She worries that maybe fear of pain keeps her up.    Energy is somewhat better.   Today,    has worked with dietitian to increase caloric intake and now eating 6 meals a day and now maintaining weight around 150 lbs, no longer losing       Just occasional nausea, but no real vomiting. Maybe had an episode last month (reports a lot of anxiety at that time regarding surgery). GOt ativan and that helped.       4. Constipation  5. Bloating  Taken/summarized from my prior documentation:   Ms. Loo reports that she's had constipation ongoing for years which began after she had her first child (20+ years ago). She denies any issues with her bowels as an infant or child, and reports her baseline pattern was moving her bowels every day to every other  "day up until her pregnancy.       She had a bowel cleanout around the time her symptoms began and has been on Miralax intermittently since then. She began more regular senna use around 2015/2016 and then colace was started a couple months before her TPIAT (2016, in the setting of increased pain medication dosing). She has intermittently done bowel cleanouts through her primary care clinic or as recommended by Dr. Phoenix; these cleanouts were generally with Miralax/Gatorade prep and she reported good results.  Post-TPIAT she did require intermittent enemas and suppositories while still on opiates but has not continued these medications.       By Nov 2017 she was on senna twice daily and colace twice daily since her TPIAT. She had been on Milk of Mg BID and Miralax as needed, but was still experiencing constipation. A few months after her surgery she was started on Linzess. She reported severed diarrhea after 1-2 doses and thus discontinued the medication. She was then placed on Amitiza, ultimately titrating to 24 mcg in morning and night. Intially she was having BMs daily, but has since had longer periods of time without BMs.       When we first met,  her pattern was quite variable with she treated with varying her Amitiza dose which we felt was perpetuating the pattern (please see initial GI consultation Nov 2017 for further details). We worked on a steady Amitiza dose with decreasing amounts of senna and colace on days of looser stools. Her Creon dose has remained stable at about 15 capsules each day, with no noted steatorrhea.         Since her last GI clinic visit, Ms. Loo feels her pattern has improved with fewer \"loose\" days and fewer \"constipated\" days. She is taking Amitiza 3 tabs, 1 senna, and 1 colace before bed. In the morning she takes another senna and colace. Currently she stools 6 days each week. Generally she will have a morning cluster of 2-3 stools, [place] 3-4. She may have some urgency, but no " "fecal incontinence. She may have another stool after exercising. She has only required bisacodyl a few times for \"constipated\" days/unsatisfactory BMs.     About 2-3 days each month she notes looser stools. Stools are passed as a few clusters during the day, [place] 5-6 with urgency. The stools are \"floaty\" but she hasn't noted any grease in the toilet bowl. She does have increased flatus. She has not tried increasing her Creon on these days and expresses a lot of concern about adjusting Creon dosing. She also shares concern about bloating discomfort with increased belching and flatus. This bloating does improve after passing flatus or stooling. Through Dr. Phoenix in pancreas txplt clinic, Ms. Loo was given metronidazole for SIBO to see if this led to any improvement. Unfortunately, Ms. Loo developed a \"severe rash\" while on the metronidazole. This resolved shortly after the medication was discontinued.        **Today  Saw RD at [facility], doubled-multivitamin and started folate.  Wondering about testing for micronutrients.         Took rifaximin. Bloating improved right away, felt stool improved towards end of course (less floaty, less greasy, more formed, brown color).  OVerall feels much better. Has had intermittent bloating since then - much shorter lastly.  Stools, typically 3 daily (right away in morning, after breakfast, and after exercise). No stress incontinence - not having leakage. Still on Amitiza and colace. Did stop nightly senna, still on a.m. Senna.      Feeling heaviness, like things shift into low abd/low pelvis. This happens at end of day. Wonders if full of stool. Doesn't really have rectal symptoms, may infrequently have rectal fullness. Sometimes passes flatus and this improves rectal pressure. Hasn't felt need to use suppositories. Feels low gas diet does really help with bloating.        Has incisional hernia - planned for repair with Dr. Phoenix    Today,   Bloating:  Did a SIBO/FODMAP " diet with her friend a [place] dietitian.     Does find avoiding fermented foods (krystyna seeds) and gas foods (broccoli) is very helpful. Feels this has really helped. Has started liberalizing the diet - eating some fresh fruit, not eating raw vegetables, but tolerated cooked vegetables.      Felt the rifaximin previously helped, and hasn't had symptoms enough to require it again. Knows to call if needed.    Constipation:  Still on Amitiza - 2 in the morning, 3 in evening  Daily colace  Decreased senna down to twice a week. Uses if no BM that day, uses it at night or if BM is small.  Uses bisacodyl only if more than 3 days without a BM - estimates has only needed this a few times since last visit.  Moving bowels nearly most days - stools are generally [place] 4         6. GERD  7. Dysphagia  Taken/summarized from my prior documentation:    Her GERD is experienced as substernal and throat burning as well as bringing up of nocturnal refluxant (would awaken choking) and refluxant with bending over. This was worked up by several GI providers and, most recently, she was seen by Dr. George Flores here at [facility]. She reports a prior Schatzki's ring which required dilation in the past. Esophageal manometry done here was normal; pH impedence revealed a [place] of 24 with positive symptom association.  Recommendations at her most recent esophageal clinic visit in Jan 2016 were for BID PPI with consideration of BID H2 blocker if still symptomatic. A trial of carafate did not improve her GERD. Ms. Loo states that pursuit of Linx procedure was discussed with Dr. George Flores. She has done well with daily lansoprazole and lifestyle/behavioural changes and thus has not pursued it yet.a.      Today, doesn't feel like had symptoms since TPIAT. Wonders if needs to continue lifelong.       Today,   Stopped PPI - didn't want to be on med longterm.   Started H2 blocker, ranitidine 150 mg at bedtime.   Takes Tums 2-3 times a day for  breakthrough heartburn/substernal burning.         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  Document previously scanned on 1/12/17.  Validation form completed and scanned.  Code Status reflects choices in most recent ACP document..  Confirmed/documented designated decision maker(s).  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 Provider to review and confirm.       GERD (gastroesophageal reflux disease) 12/01/2010     Priority: Medium     Lumbago 04/18/2005     Priority: Medium     History of L5-S1 degenerative disk disease.         Sprain and strain of other specified sites of hip and thigh 12/09/2002     Priority: Medium     Chondromalacia of patella 12/09/2002     Priority: Medium     Need for prophylactic immunotherapy      Priority: Medium     trees, grass, srw, dust mites, cat       Allergic rhinitis due to other allergen 12/21/2001     Priority: Medium     Hypothyroidism      Priority: Medium     Problem list name updated by automated process.  "Provider to review       Sensorineural hearing loss 01/10/2005     Priority: Medium     Problem list name updated by automated process. Provider to review       Vertiginous syndrome and labyrinthine disorder 01/10/2005     Priority: Medium     Problem list name updated by automated process. Provider to review         PERTINENT MEDICATIONS:  Current Outpatient Prescriptions   Medication     acetaminophen 500 MG CAPS     amitriptyline (ELAVIL) 50 MG tablet     amylase-lipase-protease (CREON 24) 53883-27861 UNITS CPEP per EC capsule     aspirin 81 MG EC tablet     azelastine (ASTELIN) 0.1 % spray     blood glucose monitoring (PAT CONTOUR NEXT) test strip     blood glucose monitoring (PAT MICROLET) lancets     cetirizine (ZYRTEC) 10 MG tablet     diphenhydrAMINE (BENADRYL ALLERGY) 25 MG capsule     docusate sodium (COLACE) 100 MG capsule     estradiol (VAGIFEM) 10 MCG TABS vaginal tablet     FOLIC ACID PO     glucose 40 % GEL gel     ibuprofen (ADVIL/MOTRIN) 400 MG tablet     levothyroxine (SYNTHROID) 137 MCG tablet     Lubiprostone (AMITIZA) 8 MCG CAPS capsule     multivitamin CF formula (CHOICEFUL) capsule     order for DME     prochlorperazine (COMPAZINE) 5 MG tablet     ranitidine (ZANTAC) 150 MG tablet     senna-docusate (SENOKOT-S;PERICOLACE) 8.6-50 MG per tablet     Sharps Container (BD SHARPS ) MISC     ZOLMitriptan (ZOMIG) 5 MG tablet     amitriptyline (ELAVIL) 10 MG tablet     HYDROmorphone (DILAUDID) 2 MG tablet     LORazepam (ATIVAN) 1 MG tablet     No current facility-administered medications for this visit.          PHYSICAL EXAMINATION:  Vitals /71 (BP Location: Left arm, Patient Position: Sitting, Cuff Size: Adult Regular)  Pulse 94  Temp 98.3  F (36.8  C) (Oral)  Resp 16  Ht 1.727 m (5' 8\")  Wt 68.7 kg (151 lb 6.4 oz)  SpO2 97%  BMI 23.02 kg/m2   Wt   Wt Readings from Last 2 Encounters:   09/05/18 68.7 kg (151 lb 6.4 oz)   08/24/18 70.4 kg (155 lb 3.2 oz)   Gen: Pt sitting up  " in NAD, interactive and cooperative on exam  Eyes: sclerae anicteric, no injection  ENT:  MMM  Cardiac: RRR, nl S1, S2  Resp: Clear on anterior exam  GI: Normoactive BS, abd soft, nontender   Incisions well-healed with small scabs, no surrounding erythema, no tenderness  Skin: Warm, dry, no jaundice  Neuro: alert, oriented, answers questions appropriately      PERTINENT STUDIES:    Orders Only on 07/31/2018   Component Date Value Ref Range Status     WBC 07/31/2018 5.6  4.0 - 11.0 10e9/L Final     RBC Count 07/31/2018 4.43  3.8 - 5.2 10e12/L Final     Hemoglobin 07/31/2018 13.7  11.7 - 15.7 g/dL Final     Hematocrit 07/31/2018 41.7  35.0 - 47.0 % Final     MCV 07/31/2018 94  78 - 100 fl Final     MCH 07/31/2018 30.9  26.5 - 33.0 pg Final     MCHC 07/31/2018 32.9  31.5 - 36.5 g/dL Final     RDW 07/31/2018 13.9  10.0 - 15.0 % Final     Platelet Count 07/31/2018 377  150 - 450 10e9/L Final     Sodium 07/31/2018 138  133 - 144 mmol/L Final     Potassium 07/31/2018 3.9  3.4 - 5.3 mmol/L Final     Chloride 07/31/2018 105  94 - 109 mmol/L Final     Carbon Dioxide 07/31/2018 28  20 - 32 mmol/L Final     Anion Gap 07/31/2018 5  3 - 14 mmol/L Final     Glucose 07/31/2018 91  70 - 99 mg/dL Final     Urea Nitrogen 07/31/2018 14  7 - 30 mg/dL Final     Creatinine 07/31/2018 0.68  0.52 - 1.04 mg/dL Final     GFR Estimate 07/31/2018 >90  >60 mL/min/1.7m2 Final     GFR Estimate If Black 07/31/2018 >90  >60 mL/min/1.7m2 Final     Calcium 07/31/2018 9.0  8.5 - 10.1 mg/dL Final     Bilirubin Total 07/31/2018 0.7  0.2 - 1.3 mg/dL Final     Albumin 07/31/2018 3.7  3.4 - 5.0 g/dL Final     Protein Total 07/31/2018 7.6  6.8 - 8.8 g/dL Final     Alkaline Phosphatase 07/31/2018 170* 40 - 150 U/L Final     ALT 07/31/2018 77* 0 - 50 U/L Final     AST 07/31/2018 69* 0 - 45 U/L Final     Color Urine 07/31/2018 Straw   Final     Appearance Urine 07/31/2018 Clear   Final     Glucose Urine 07/31/2018 Negative  NEG^Negative mg/dL Final      Bilirubin Urine 07/31/2018 Negative  NEG^Negative Final     Ketones Urine 07/31/2018 Negative  NEG^Negative mg/dL Final     Specific Gravity Urine 07/31/2018 1.003  1.003 - 1.035 Final     Blood Urine 07/31/2018 Negative  NEG^Negative Final     pH Urine 07/31/2018 7.0  5.0 - 7.0 pH Final     Protein Albumin Urine 07/31/2018 Negative  NEG^Negative mg/dL Final     Urobilinogen mg/dL 07/31/2018 0.0  0.0 - 2.0 mg/dL Final     Nitrite Urine 07/31/2018 Negative  NEG^Negative Final     Leukocyte Esterase Urine 07/31/2018 Negative  NEG^Negative Final     Source 07/31/2018 Midstream Urine   Final     WBC Urine 07/31/2018 <1  0 - 5 /HPF Final     RBC Urine 07/31/2018 <1  0 - 2 /HPF Final     Bacteria Urine 07/31/2018 Few* NEG^Negative /HPF Final     Mucous Urine 07/31/2018 Present* NEG^Negative /LPF Final           Answers for HPI/ROS submitted by the patient on 9/4/2018   General Symptoms: Yes  Skin Symptoms: No  HENT Symptoms: No  EYE SYMPTOMS: Yes  HEART SYMPTOMS: Yes  LUNG SYMPTOMS: No  INTESTINAL SYMPTOMS: Yes  URINARY SYMPTOMS: No  GYNECOLOGIC SYMPTOMS: Yes  BREAST SYMPTOMS: No  SKELETAL SYMPTOMS: No  BLOOD SYMPTOMS: No  NERVOUS SYSTEM SYMPTOMS: Yes  MENTAL HEALTH SYMPTOMS: Yes  Fever: No  Loss of appetite: No  Weight loss: No  Weight gain: No  Fatigue: Yes  Night sweats: Yes  Chills: No  Increased stress: Yes  Excessive hunger: No  Excessive thirst: No  Feeling hot or cold when others believe the temperature is normal: Yes  Loss of height: No  Post-operative complications: No  Surgical site pain: Yes  Hallucinations: No  Change in or Loss of Energy: No  Hyperactivity: No  Confusion: No  Eye pain: No  Vision loss: No  Dry eyes: Yes  Watery eyes: No  Eye bulging: No  Double vision: No  Flashing of lights: No  Spots: No  Floaters: No  Redness: No  Crossed eyes: No  Tunnel Vision: No  Yellowing of eyes: No  Eye irritation: Yes  Chest pain or pressure: No  Fast or irregular heartbeat: No  Pain in legs with walking:  No  Trouble breathing while lying down: No  Fingers or toes appear blue: No  High blood pressure: No  Low blood pressure: Yes  Fainting: No  Murmurs: No  Pacemaker: No  Varicose veins: No  Edema or swelling: No  Wake up at night with shortness of breath: No  Light-headedness: No  Exercise intolerance: No  Heart burn or indigestion: Yes  Nausea: Yes  Vomiting: Yes  Abdominal pain: No  Bloating: Yes  Constipation: No  Diarrhea: No  Blood in stool: No  Black stools: No  Rectal or Anal pain: No  Fecal incontinence: No  Yellowing of skin or eyes: No  Vomit with blood: No  Change in stools: No  Trouble with coordination: No  Dizziness or trouble with balance: No  Fainting or black-out spells: No  Memory loss: No  Headache: Yes  Seizures: No  Speech problems: No  Tingling: No  Tremor: No  Weakness: No  Difficulty walking: No  Paralysis: No  Numbness: No  Bleeding or spotting between periods: No  Heavy or painful periods: No  Irregular periods: No  Vaginal discharge: No  Hot flashes: Yes  Vaginal dryness: No  Genital ulcers: No  Reduced libido: No  Painful intercourse: No  Difficulty with sexual arousal: No  Post-menopausal bleeding: No  Nervous or Anxious: Yes  Depression: Yes  Trouble sleeping: Yes  Trouble thinking or concentrating: Yes  Mood changes: Yes  Panic attacks: No

## 2018-09-05 NOTE — LETTER
9/5/2018       RE: Dinora Mcghee  [address] [facility] [address]     Dear Colleague,    Thank you for referring your patient, Dinora Mcghee, to the [facility] at [facility]. Please see a copy of my visit note below.    GI CLINIC VISIT    CC:   Follow-up       ASSESSMENT/PLAN:    1. Continue on the low gas diet  2. Continue lifestyle changes to limit aerophagia (no carbonated beverages, no straws, no gum, no hard candy). Eat slowly.   3.Continue your Amitiza  2 tabs in the morning and 3 tabs at night.   4. Continue daily colace.   5. Ok for senna as needed, agree with slowly backing off this medication as able.   6. Continue bisacodyl as needed if three days without a BM.   7. Continue your efforts to have 5-6 small meals/snacks a day.   8. Limit high fiber and high fat loads to your stomach.   9. Continue your pancreatic enzymes.  10. Will restart PPI (omeprazole) given your heartburn symptoms. Stop ranitidine.   11. If having issues with pronounced bloating, increased flatus, fatty stools, etc despite diet changes, contact GI clinic. We can consider whether a course of rifaximin would be useful.   12. Follow-up in GI clinic in 6 months with Dr. Genao.       It was a pleasure to participate in the care of this patient; please contact us with any further questions.  A total of 20 face-to-face minutes was spent with this patient, >50% of which was counseling regarding the above delineated issues.    Vijaya Genao MD   of Medicine  Division of Gastroenterology, Hepatology and Nutrition  [facility]    ---------------------------------------------------------------------------------------------------  HPI:        Hernia repair last week - ended up having to be in hospital for 24 hours due to BP. Was quite sleepy after anesthesia. Now behind on work. Incisions are healing well.  Weight restrictions,  "so not exercising/daily walking      Ms. 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 She returns today for follow-up.          1. Abdominal pain  Taken/summarized from my prior documentation:    Ms. Loo had issues with severe daily abdominal pain prior to her TPIAT. Overall she felt she was doing well after surgery and was able to be weaned off of narcotics though she states she still follows in the pain clinic. Three or four times since April 2017 (about once every other month), she has experienced pronounced abdominal pain similar in nature to her prior chronic pancreatic pain. The pain is localized in the epigastric region and just under her right 12th rib; the pain does not radiate beyond that point and is described as \"sharp.\" It did wake her from sleep on one occasion prompting an ED visit on 5/20/17. At that time pain was reported in the LUQ and she was seen to on CT have left abdominal wall fat stranding but with no associated cellulitis or intra-abdominal pathology. An MR abd done a month prior demonstrated a subQ fluid collection in this area. She was discharged to home from the ED. An MRI done thereafter in July 2017 did not show persistance of this finding and heterogenous area of enhancement in the dome of the liver (initially seen on 5/20/17 CT) was improved.    She expresses much concern that this is a recurrence of her prior pancreas pain, though she feels it shouldn't happen since \"my pancreas is gone.\" She shares that she has been working with her " "psychologist in pain clinic on mind-body interventions; they have apparently discussed that this could be a reactivation of prior pain pathways. She cannot provide much more in the way of details of the pain during episodes, what she'd been doing, eating, or how it related to her stool pattern.         Ms. Loo also notes a much less pronounced pain which comes on in the same area, but only with intense exercise. This seems to get better with stopping or walking and has not occurred at other times.       At her last visit she reported some improvement in her deep pain with breathing exercises and relaxation techniques that she's been working on. Today, she feels her pain is stable with no marked change.    Today,    abdominal pain is \"zero\" even nothing from her recent surgery          2. Gastroparesis  3. Nausea, vomiting  Taken/summarized from my prior documentation:    GES pursued in the setting of chronic nausea and vomiting during her chronic pancreatitis and TPIAT evaluation. She states she had two-hour study at [facility] that may have been consistent with gastroparesis. A follow-up four-hour study was done here on 6/27/16 with 49% seen remaining at 4 hours. Ms. Loo is not sure if she was on narcotics at that time. Per her recollection she didn't start daily pain medications until Fall 2016, but she did use them intermittently prior to that time. A G-J tube was placed in fall 2016 with G-tube used for venting and jejunal extension for tube feeds. After her TPIAT she was able to wean off her TFs and felt she no longer required venting. By spring 2017 she was tolerating a regular diet with no nausea or vomiting and in March 2017 her G-J tube was able to be removed.  Throughout the spring and summer she was on a regular diet and had no bloating, nausea, or vomiting.     Prior to her initial GI consultation she developed \"severe\" migraine HAs with associated vomiting. She describes 12 hours of emesis and " retching with 12+ hours of HA pain similar to her prior migraines. Since then has had frequent HAs with both prolonged migraines (lasting 3 days per report) and frequent migraines (up to 3-5 days out of a week).    She is on chronic amitriptyline for migraine prophylaxis. She has had previously used sumatriptan for migraines, but this last efficacy and some time ago she was switched to zolmitriptan. Unfortunately she does not feel the zolmitriptan is working any longer. She had not seen a neurologist for several years. For her nausea and vomiting she uses scopolamine patch, procholorperazine, or promethazine all with equal effect. She likes the scopolamine but limits it use due to side effects. We did refer her to neurology for additional evaluation.     At her last GI visit Ms. Loo reported 6 episodes of emesis in the preceding four months, Of these episode at least a few were noted that after large liquid ingestions where she will feel the need to burp and will bring up some of the liquid with eructation.     She was able to see a neurologist recently who recommended a dose increase in her amitriptyline. Ms. Loo feels her migraines have decreased from 10-12 each month to 4-5 in a month.       Today, Maybe vomited three times since last visit. Reports this was mostly with migraines. Migraines much better - now on 60 of amitriptyline. Sleep is still difficult - continues to work with Dr. Canchola of neurology and Maria Elena Abdalla psych with pain clinic on this. She worries that maybe fear of pain keeps her up.    Energy is somewhat better.   Today,    has worked with dietitian to increase caloric intake and now eating 6 meals a day and now maintaining weight around 150 lbs, no longer losing       Just occasional nausea, but no real vomiting. Maybe had an episode last month (reports a lot of anxiety at that time regarding surgery). GOt ativan and that helped.       4. Constipation  5. Bloating  Taken/summarized  from my prior documentation:   Ms. Loo reports that she's had constipation ongoing for years which began after she had her first child (20+ years ago). She denies any issues with her bowels as an infant or child, and reports her baseline pattern was moving her bowels every day to every other day up until her pregnancy.       She had a bowel cleanout around the time her symptoms began and has been on Miralax intermittently since then. She began more regular senna use around 2015/2016 and then colace was started a couple months before her TPIAT (2016, in the setting of increased pain medication dosing). She has intermittently done bowel cleanouts through her primary care clinic or as recommended by Dr. Phoenix; these cleanouts were generally with Miralax/Gatorade prep and she reported good results.  Post-TPIAT she did require intermittent enemas and suppositories while still on opiates but has not continued these medications.       By Nov 2017 she was on senna twice daily and colace twice daily since her TPIAT. She had been on Milk of Mg BID and Miralax as needed, but was still experiencing constipation. A few months after her surgery she was started on Linzess. She reported severed diarrhea after 1-2 doses and thus discontinued the medication. She was then placed on Amitiza, ultimately titrating to 24 mcg in morning and night. Intially she was having BMs daily, but has since had longer periods of time without BMs.       When we first met,  her pattern was quite variable with she treated with varying her Amitiza dose which we felt was perpetuating the pattern (please see initial GI consultation Nov 2017 for further details). We worked on a steady Amitiza dose with decreasing amounts of senna and colace on days of looser stools. Her Creon dose has remained stable at about 15 capsules each day, with no noted steatorrhea.         Since her last GI clinic visit, Ms. Loo feels her pattern has improved with fewer  "\"loose\" days and fewer \"constipated\" days. She is taking Amitiza 3 tabs, 1 senna, and 1 colace before bed. In the morning she takes another senna and colace. Currently she stools 6 days each week. Generally she will have a morning cluster of 2-3 stools, [place] 3-4. She may have some urgency, but no fecal incontinence. She may have another stool after exercising. She has only required bisacodyl a few times for \"constipated\" days/unsatisfactory BMs.     About 2-3 days each month she notes looser stools. Stools are passed as a few clusters during the day, [place] 5-6 with urgency. The stools are \"floaty\" but she hasn't noted any grease in the toilet bowl. She does have increased flatus. She has not tried increasing her Creon on these days and expresses a lot of concern about adjusting Creon dosing. She also shares concern about bloating discomfort with increased belching and flatus. This bloating does improve after passing flatus or stooling. Through Dr. Phoenix in pancreas txplt clinic, Ms. Loo was given metronidazole for SIBO to see if this led to any improvement. Unfortunately, Ms. Loo developed a \"severe rash\" while on the metronidazole. This resolved shortly after the medication was discontinued.        **Today  Saw RD at [place], doubled-multivitamin and started folate.  Wondering about testing for micronutrients.         Took rifaximin. Bloating improved right away, felt stool improved towards end of course (less floaty, less greasy, more formed, brown color).  OVerall feels much better. Has had intermittent bloating since then - much shorter lastly.  Stools, typically 3 daily (right away in morning, after breakfast, and after exercise). No stress incontinence - not having leakage. Still on Amitiza and colace. Did stop nightly senna, still on a.m. Senna.      Feeling heaviness, like things shift into low abd/low pelvis. This happens at end of day. Wonders if full of stool. Doesn't really have rectal " symptoms, may infrequently have rectal fullness. Sometimes passes flatus and this improves rectal pressure. Hasn't felt need to use suppositories. Feels low gas diet does really help with bloating.        Has incisional hernia - planned for repair with Dr. Phoenix    Today,   Bloating:  Did a SIBO/FODMAP diet with her friend a [place] dietitian.     Does find avoiding fermented foods (krystyna seeds) and gas foods (broccoli) is very helpful. Feels this has really helped. Has started liberalizing the diet - eating some fresh fruit, not eating raw vegetables, but tolerated cooked vegetables.      Felt the rifaximin previously helped, and hasn't had symptoms enough to require it again. Knows to call if needed.    Constipation:  Still on Amitiza - 2 in the morning, 3 in evening  Daily colace  Decreased senna down to twice a week. Uses if no BM that day, uses it at night or if BM is small.  Uses bisacodyl only if more than 3 days without a BM - estimates has only needed this a few times since last visit.  Moving bowels nearly most days - stools are generally [place] 4         6. GERD  7. Dysphagia  Taken/summarized from my prior documentation:    Her GERD is experienced as substernal and throat burning as well as bringing up of nocturnal refluxant (would awaken choking) and refluxant with bending over. This was worked up by several GI providers and, most recently, she was seen by Dr. George Flores here at [facility]. She reports a prior Schatzki's ring which required dilation in the past. Esophageal manometry done here was normal; pH impedence revealed a [place]ter of 24 with positive symptom association.  Recommendations at her most recent esophageal clinic visit in Jan 2016 were for BID PPI with consideration of BID H2 blocker if still symptomatic. A trial of carafate did not improve her GERD. Ms. Loo states that pursuit of Linx procedure was discussed with Dr. George Flores. She has done well with daily lansoprazole and  lifestyle/behavioural changes and thus has not pursued it yet.a.      Today, doesn't feel like had symptoms since TPIAT. Wonders if needs to continue lifelong.       Today,   Stopped PPI - didn't want to be on med longterm.   Started H2 blocker, ranitidine 150 mg at bedtime.   Takes Tums 2-3 times a day for breakthrough heartburn/substernal burning.         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  Document previously scanned on 1/12/17.  Validation form completed and scanned.  Code Status reflects choices in most recent ACP document..  Confirmed/documented designated decision maker(s).  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 Provider to review and confirm.       GERD (gastroesophageal reflux disease) 12/01/2010     Priority: Medium     Lumbago 04/18/2005     Priority: Medium     History of L5-S1 degenerative disk disease.         Sprain and strain of other specified sites of hip and thigh 12/09/2002     Priority: Medium     Chondromalacia of patella  12/09/2002     Priority: Medium     Need for prophylactic immunotherapy      Priority: Medium     trees, grass, srw, dust mites, cat       Allergic rhinitis due to other allergen 12/21/2001     Priority: Medium     Hypothyroidism      Priority: Medium     Problem list name updated by automated process. Provider to review       Sensorineural hearing loss 01/10/2005     Priority: Medium     Problem list name updated by automated process. Provider to review       Vertiginous syndrome and labyrinthine disorder 01/10/2005     Priority: Medium     Problem list name updated by automated process. Provider to review         PERTINENT MEDICATIONS:  Current Outpatient Prescriptions   Medication     acetaminophen 500 MG CAPS     amitriptyline (ELAVIL) 50 MG tablet     amylase-lipase-protease (CREON 24) 83997-75223 UNITS CPEP per EC capsule     aspirin 81 MG EC tablet     azelastine (ASTELIN) 0.1 % spray     blood glucose monitoring (PAT CONTOUR NEXT) test strip     blood glucose monitoring (PAT MICROLET) lancets     cetirizine (ZYRTEC) 10 MG tablet     diphenhydrAMINE (BENADRYL ALLERGY) 25 MG capsule     docusate sodium (COLACE) 100 MG capsule     estradiol (VAGIFEM) 10 MCG TABS vaginal tablet     FOLIC ACID PO     glucose 40 % GEL gel     ibuprofen (ADVIL/MOTRIN) 400 MG tablet     levothyroxine (SYNTHROID) 137 MCG tablet     Lubiprostone (AMITIZA) 8 MCG CAPS capsule     multivitamin CF formula (CHOICEFUL) capsule     order for DME     prochlorperazine (COMPAZINE) 5 MG tablet     ranitidine (ZANTAC) 150 MG tablet     senna-docusate (SENOKOT-S;PERICOLACE) 8.6-50 MG per tablet     Sharps Container (BD SHARPS ) MISC     ZOLMitriptan (ZOMIG) 5 MG tablet     amitriptyline (ELAVIL) 10 MG tablet     HYDROmorphone (DILAUDID) 2 MG tablet     LORazepam (ATIVAN) 1 MG tablet     No current facility-administered medications for this visit.          PHYSICAL EXAMINATION:  Vitals /71 (BP Location: Left arm, Patient  "Position: Sitting, Cuff Size: Adult Regular)  Pulse 94  Temp 98.3  F (36.8  C) (Oral)  Resp 16  Ht 1.727 m (5' 8\")  Wt 68.7 kg (151 lb 6.4 oz)  SpO2 97%  BMI 23.02 kg/m2   Wt   Wt Readings from Last 2 Encounters:   09/05/18 68.7 kg (151 lb 6.4 oz)   08/24/18 70.4 kg (155 lb 3.2 oz)   Gen: Pt sitting up  in NAD, interactive and cooperative on exam  Eyes: sclerae anicteric, no injection  ENT:  MMM  Cardiac: RRR, nl S1, S2  Resp: Clear on anterior exam  GI: Normoactive BS, abd soft, nontender   Incisions well-healed with small scabs, no surrounding erythema, no tenderness  Skin: Warm, dry, no jaundice  Neuro: alert, oriented, answers questions appropriately      PERTINENT STUDIES:    Orders Only on 07/31/2018   Component Date Value Ref Range Status     WBC 07/31/2018 5.6  4.0 - 11.0 10e9/L Final     RBC Count 07/31/2018 4.43  3.8 - 5.2 10e12/L Final     Hemoglobin 07/31/2018 13.7  11.7 - 15.7 g/dL Final     Hematocrit 07/31/2018 41.7  35.0 - 47.0 % Final     MCV 07/31/2018 94  78 - 100 fl Final     MCH 07/31/2018 30.9  26.5 - 33.0 pg Final     MCHC 07/31/2018 32.9  31.5 - 36.5 g/dL Final     RDW 07/31/2018 13.9  10.0 - 15.0 % Final     Platelet Count 07/31/2018 377  150 - 450 10e9/L Final     Sodium 07/31/2018 138  133 - 144 mmol/L Final     Potassium 07/31/2018 3.9  3.4 - 5.3 mmol/L Final     Chloride 07/31/2018 105  94 - 109 mmol/L Final     Carbon Dioxide 07/31/2018 28  20 - 32 mmol/L Final     Anion Gap 07/31/2018 5  3 - 14 mmol/L Final     Glucose 07/31/2018 91  70 - 99 mg/dL Final     Urea Nitrogen 07/31/2018 14  7 - 30 mg/dL Final     Creatinine 07/31/2018 0.68  0.52 - 1.04 mg/dL Final     GFR Estimate 07/31/2018 >90  >60 mL/min/1.7m2 Final     GFR Estimate If Black 07/31/2018 >90  >60 mL/min/1.7m2 Final     Calcium 07/31/2018 9.0  8.5 - 10.1 mg/dL Final     Bilirubin Total 07/31/2018 0.7  0.2 - 1.3 mg/dL Final     Albumin 07/31/2018 3.7  3.4 - 5.0 g/dL Final     Protein Total 07/31/2018 7.6  6.8 - 8.8 " g/dL Final     Alkaline Phosphatase 07/31/2018 170* 40 - 150 U/L Final     ALT 07/31/2018 77* 0 - 50 U/L Final     AST 07/31/2018 69* 0 - 45 U/L Final     Color Urine 07/31/2018 Straw   Final     Appearance Urine 07/31/2018 Clear   Final     Glucose Urine 07/31/2018 Negative  NEG^Negative mg/dL Final     Bilirubin Urine 07/31/2018 Negative  NEG^Negative Final     Ketones Urine 07/31/2018 Negative  NEG^Negative mg/dL Final     Specific Gravity Urine 07/31/2018 1.003  1.003 - 1.035 Final     Blood Urine 07/31/2018 Negative  NEG^Negative Final     pH Urine 07/31/2018 7.0  5.0 - 7.0 pH Final     Protein Albumin Urine 07/31/2018 Negative  NEG^Negative mg/dL Final     Urobilinogen mg/dL 07/31/2018 0.0  0.0 - 2.0 mg/dL Final     Nitrite Urine 07/31/2018 Negative  NEG^Negative Final     Leukocyte Esterase Urine 07/31/2018 Negative  NEG^Negative Final     Source 07/31/2018 Midstream Urine   Final     WBC Urine 07/31/2018 <1  0 - 5 /HPF Final     RBC Urine 07/31/2018 <1  0 - 2 /HPF Final     Bacteria Urine 07/31/2018 Few* NEG^Negative /HPF Final     Mucous Urine 07/31/2018 Present* NEG^Negative /LPF Final       Again, thank you for allowing me to participate in the care of your patient.      Sincerely,    Vijaya Genoa MD

## 2018-09-05 NOTE — MR AVS SNAPSHOT
After Visit Summary   9/5/2018    Dinora Mcghee    MRN: [id number]           Patient Information     Date Of Birth          1965        Visit Information        Provider Department      9/5/2018 4:20 PM Vijaya Genao MD [facility]        Today's Diagnoses     Gastroesophageal reflux disease without esophagitis    -  1      Care Instructions    1. Continue on the low gas diet  2. Continue lifestyle changes to limit aerophagia (no carbonated beverages, no straws, no gum, no hard candy). Eat slowly.   3.Continue your Amitiza  2 tabs in the morning and 3 tabs at night.   4. Continue daily colace.   5. Ok for senna as needed, agree with slowly backing off this medication as able.   6. Continue bisacodyl as needed if three days without a BM.   7. Continue your efforts to have 5-6 small meals/snacks a day.   8. Limit high fiber and high fat loads to your stomach.   9. Continue your pancreatic enzymes.  10. Will restart PPI (omeprazole) given your heartburn symptoms. Stop ranitidine.   11. If having issues with pronounced bloating, increased flatus, fatty stools, etc despite diet changes, contact GI clinic. We can consider whether a course of rifaximin would be useful.   12. Follow-up in GI clinic in 6 months with Dr. Genao.     If you have any questions, please don't hesitate to contact our GI RN Clinic Coordinators at [phone] (select option #3 for nurse line).               Follow-ups after your visit        Your next 10 appointments already scheduled     Sep 14, 2018 11:30 AM CDT   (Arrive by 11:15 AM)   Return Visit with Anita Becerra MD   [facility] Rheumatology ([facility])    [address] [place] [address]  [address]  [facility] [address] [phone]            Sep 21, 2018  3:00 PM CDT   (Arrive by 2:45 PM)   Return Visit with Maria Elena Abdalla, PhD   [facility] ([facility]  "[facility])    [address] [address] [phone]              Who to contact     Please call your clinic at [phone] to:    Ask questions about your health    Make or cancel appointments    Discuss your medicines    Learn about your test results    Speak to your doctor            Additional Information About Your Visit        [facility] Information     [employer] gives you secure access to your electronic health record. If you see a primary care provider, you can also send messages to your care team and make appointments. If you have questions, please call your primary care clinic.  If you do not have a primary care provider, please call [phone] and they will assist you.      [employer] is an electronic gateway that provides easy, online access to your medical records. With [employer], you can request a clinic appointment, read your test results, renew a prescription or communicate with your care team.     To access your existing account, please contact your [facility] or call [phone] for assistance.        Care EveryWhere ID     This is your Care EveryWhere ID. This could be used by other organizations to access your [facility] medical records  [id number]        Your Vitals Were     Pulse Temperature Respirations Height Pulse Oximetry BMI (Body Mass Index)    94 98.3  F (36.8  C) (Oral) 16 1.727 m (5' 8\") 97% 23.02 kg/m2       Blood Pressure from Last 3 Encounters:   09/05/18 110/71   08/24/18 97/50   07/31/18 106/72    Weight from Last 3 Encounters:   09/05/18 68.7 kg (151 lb 6.4 oz)   08/24/18 70.4 kg (155 lb 3.2 oz)   07/31/18 68.3 kg (150 lb 9.6 oz)              Today, you had the following     No orders found for display         Today's Medication Changes          These changes are accurate as of 9/5/18  4:53 PM.  If you have any questions, ask your nurse or doctor.               Start taking these medicines.        Dose/Directions    " omeprazole 40 MG capsule   Commonly known as:  priLOSEC   Used for:  Gastroesophageal reflux disease without esophagitis   Started by:  Vijaya Genao MD        Dose:  40 mg   Take 1 capsule (40 mg) by mouth daily   Quantity:  90 capsule   Refills:  3            Where to get your medicines      These medications were sent to [id number] - [place], [place] - [address] [facility]  [address] [facility] [address]     Phone:  [phone]     omeprazole 40 MG capsule                Primary Care Provider Office Phone # Fax #    Latasha Paul, APRN [phone]       [address] [place] [address] [address]  [facility] [address]        Equal Access to Services     ABHAY HUITRON : Hadii monty johnsono Sopooja, waaxda luqadaha, qaybta kaalmada adedonavanyada, ivan wallace . So [facility] [phone].    ATENCIÓN: Si habla español, tiene a muñoz disposición servicios gratuitos de asistencia lingüística. Llame al [phone].    We comply with applicable federal civil rights laws and [place] laws. We do not discriminate on the basis of race, color, national origin, age, disability, sex, sexual orientation, or gender identity.            Thank you!     Thank you for choosing [facility]STROENTEROLOGY [facility] CLINIC  for your care. Our goal is always to provide you with excellent care. Hearing back from our patients is one way we can continue to improve our services. Please take a few minutes to complete the written survey that you may receive in the mail after your visit with us. Thank you!             Your Updated Medication List - Protect others around you: Learn how to safely use, store and throw away your medicines at www.disposemymeds.org.          This list is accurate as of 9/5/18  4:53 PM.  Always use your most recent med list.                   Brand Name Dispense Instructions for use Diagnosis    acetaminophen 500 MG Caps      Take 2 mg by mouth 2 times daily        *  amitriptyline 50 MG tablet    ELAVIL    60 tablet    Take 1 tablet (50 mg) by mouth At Bedtime    Headache disorder       * amitriptyline 10 MG tablet    ELAVIL          amylase-lipase-protease 20159-65926 units Cpep per EC capsule    CREON 24    450 capsule    Take 3-4 capsules by mouth with meals and 1-2 with snacks. Maximum 15 capsules per day.    Acquired total absence of pancreas       aspirin 81 MG EC tablet     90 tablet    Take 1 tablet (81 mg) by mouth daily    High platelet count (H)       azelastine 0.1 % nasal spray    ASTELIN     Spray 1 spray into both nostrils 2 times daily        BD SHARPS  Misc     1 each    1 Container as needed    Post-pancreatectomy diabetes (H)       blood glucose monitoring lancets     1 Box    Use to test blood sugar 6-8 times daily or as directed.    Acute on chronic pancreatitis (H)       blood glucose monitoring test strip    PAT CONTOUR NEXT    2 Box    Use to test blood sugar 6 times daily or as directed.    Post-pancreatectomy diabetes (H)       cetirizine 10 MG tablet    zyrTEC     Take 10 mg by mouth daily    Elevated liver enzymes       diphenhydrAMINE 25 MG capsule    BENADRYL ALLERGY    56 capsule    Take 1 capsule (25 mg) by mouth every 6 hours as needed for itching or allergies    Itching, Post-pancreatectomy diabetes (H), History of pancreatectomy, Pancreatic insufficiency       docusate sodium 100 MG capsule    COLACE    60 capsule    Take 1 capsule (100 mg) by mouth 2 times daily as needed for constipation,    Itching, Post-pancreatectomy diabetes (H), History of pancreatectomy, Pancreatic insufficiency       FOLIC ACID PO      Take 1 mg by mouth daily        glucose 40 % (400 mg/mL) Gel gel     3 Tube    Take 15-30 g by mouth every 15 minutes as needed for low blood sugar    Acquired total absence of pancreas       HYDROmorphone 2 MG tablet    DILAUDID    15 tablet    Take 0.5-1 tablets (1-2 mg) by mouth every 3 hours as needed for moderate to  severe pain    Acute post-operative pain       ibuprofen 400 MG tablet    ADVIL/MOTRIN    30 tablet    Take 1 tablet (400 mg) by mouth every 6 hours as needed for moderate pain    Acute post-operative pain       LORazepam 1 MG tablet    ATIVAN    10 tablet    Take 1 tablet (1 mg) by mouth every 6 hours as needed for anxiety    Adjustment disorder with anxious mood       Lubiprostone 8 MCG Caps capsule    AMITIZA    150 capsule    Take 5 capsules (40 mcg) by mouth daily Take 3 capsules in AM and 2 capsules in PM    Other constipation       multivitamin CF formula capsule    CHOICEFUL     Take 1 tablet by mouth daily    Itching, Post-pancreatectomy diabetes (H), History of pancreatectomy, Pancreatic insufficiency       omeprazole 40 MG capsule    priLOSEC    90 capsule    Take 1 capsule (40 mg) by mouth daily    Gastroesophageal reflux disease without esophagitis       order for DME     1 Device    Equipment being ordered:Cefaly    Headache disorder       prochlorperazine 5 MG tablet    COMPAZINE    90 tablet    Take two 5 mg tablets by mouth every six hours as needed for nausea.    Itching, Post-pancreatectomy diabetes (H), History of pancreatectomy, Pancreatic insufficiency       ranitidine 150 MG tablet    ZANTAC    90 tablet    Take 1 tablet (150 mg) by mouth At Bedtime    Gastroesophageal reflux disease without esophagitis       senna-docusate 8.6-50 MG per tablet    SENOKOT-S;PERICOLACE    100 tablet    Take 1-2 tablets by mouth 2 times daily    Incisional hernia, without obstruction or gangrene       SYNTHROID 137 MCG tablet   Generic drug:  levothyroxine      Take 137 mcg by mouth daily        VAGIFEM 10 MCG Tabs vaginal tablet   Generic drug:  estradiol           ZOMIG 5 MG tablet   Generic drug:  ZOLMitriptan      Take 5 mg by mouth at onset of headache for migraine        * Notice:  This list has 2 medication(s) that are the same as other medications prescribed for you. Read the directions carefully, and  ask your doctor or other care provider to review them with you.

## 2018-09-05 NOTE — PATIENT INSTRUCTIONS
1. Continue on the low gas diet  2. Continue lifestyle changes to limit aerophagia (no carbonated beverages, no straws, no gum, no hard candy). Eat slowly.   3.Continue your Amitiza  2 tabs in the morning and 3 tabs at night.   4. Continue daily colace.   5. Ok for senna as needed, agree with slowly backing off this medication as able.   6. Continue bisacodyl as needed if three days without a BM.   7. Continue your efforts to have 5-6 small meals/snacks a day.   8. Limit high fiber and high fat loads to your stomach.   9. Continue your pancreatic enzymes.  10. Will restart PPI (omeprazole) given your heartburn symptoms. Stop ranitidine.   11. If having issues with pronounced bloating, increased flatus, fatty stools, etc despite diet changes, contact GI clinic. We can consider whether a course of rifaximin would be useful.   12. Follow-up in GI clinic in 6 months with Dr. Genao.     If you have any questions, please don't hesitate to contact our GI RN Clinic Coordinators at [phone] (select option #3 for nurse line).

## 2018-09-05 NOTE — NURSING NOTE
"Chief Complaint   Patient presents with     Gastrointestinal Problem     GERD and abdominal pain       Vitals:    09/05/18 1558   BP: 110/71   BP Location: Left arm   Patient Position: Sitting   Cuff Size: Adult Regular   Pulse: 94   Resp: 16   Temp: 98.3  F (36.8  C)   TempSrc: Oral   SpO2: 97%   Weight: 68.7 kg (151 lb 6.4 oz)   Height: 1.727 m (5' 8\")       Body mass index is 23.02 kg/(m^2).      Elsie Cunningham LPN                          "

## 2018-09-10 ENCOUNTER — TELEPHONE (OUTPATIENT)
Dept: TRANSPLANT | Facility: CLINIC | Age: 53
End: 2018-09-10

## 2018-09-11 NOTE — TELEPHONE ENCOUNTER
Call from Dinora at 16:30. She was feeling wonderful until this weekend.She was walking 3 miles a day, eating well . Since Saturday she has had a burning,stabbing , patricia horse type pain. At times she can not even roll over, The pain is not constant but comes and goes. She feels flu like,crummy , bloated ,  No BM since Sunday and she is not sure she is passing gas.She ate a few carrot bites and a popsicle today. Advised we would try and see her in clinic for assessment in the day day or so. She will go to ER if things change overnight or she starts vomiting.

## 2018-09-12 ENCOUNTER — TELEPHONE (OUTPATIENT)
Dept: TRANSPLANT | Facility: CLINIC | Age: 53
End: 2018-09-12

## 2018-09-12 NOTE — TELEPHONE ENCOUNTER
Dinora called and has resumption of symptoms described in previous telephone encountyer. Will try and get her assessed on clinic tomorrow or Friday.

## 2018-09-13 ENCOUNTER — RADIANT APPOINTMENT (OUTPATIENT)
Dept: GENERAL RADIOLOGY | Facility: CLINIC | Age: 53
End: 2018-09-13
Payer: COMMERCIAL

## 2018-09-13 DIAGNOSIS — R10.9 ABDOMINAL PAIN: ICD-10-CM

## 2018-09-13 DIAGNOSIS — R76.8 IGG4 SELECTIVELY HIGH IN PLASMA: Primary | ICD-10-CM

## 2018-09-13 DIAGNOSIS — R76.8 IGG4 SELECTIVELY HIGH IN PLASMA: ICD-10-CM

## 2018-09-13 DIAGNOSIS — K76.9 LIVER LESION: ICD-10-CM

## 2018-09-13 DIAGNOSIS — R10.9 ABDOMINAL PAIN: Primary | ICD-10-CM

## 2018-09-13 LAB
ALBUMIN SERPL-MCNC: 3.9 G/DL (ref 3.4–5)
ALP SERPL-CCNC: 145 U/L (ref 40–150)
ALT SERPL W P-5'-P-CCNC: 59 U/L (ref 0–50)
ANION GAP SERPL CALCULATED.3IONS-SCNC: 5 MMOL/L (ref 3–14)
AST SERPL W P-5'-P-CCNC: 39 U/L (ref 0–45)
BASOPHILS # BLD AUTO: 0.1 10E9/L (ref 0–0.2)
BASOPHILS NFR BLD AUTO: 2.1 %
BILIRUB DIRECT SERPL-MCNC: 0.2 MG/DL (ref 0–0.2)
BILIRUB SERPL-MCNC: 0.8 MG/DL (ref 0.2–1.3)
BUN SERPL-MCNC: 12 MG/DL (ref 7–30)
CALCIUM SERPL-MCNC: 9.2 MG/DL (ref 8.5–10.1)
CHLORIDE SERPL-SCNC: 102 MMOL/L (ref 94–109)
CO2 SERPL-SCNC: 30 MMOL/L (ref 20–32)
CREAT SERPL-MCNC: 0.83 MG/DL (ref 0.52–1.04)
DIFFERENTIAL METHOD BLD: NORMAL
EOSINOPHIL # BLD AUTO: 0.6 10E9/L (ref 0–0.7)
EOSINOPHIL NFR BLD AUTO: 9.8 %
ERYTHROCYTE [DISTWIDTH] IN BLOOD BY AUTOMATED COUNT: 13.2 % (ref 10–15)
GFR SERPL CREATININE-BSD FRML MDRD: 72 ML/MIN/1.7M2
GLUCOSE SERPL-MCNC: 119 MG/DL (ref 70–99)
HCT VFR BLD AUTO: 42.1 % (ref 35–47)
HGB BLD-MCNC: 14.1 G/DL (ref 11.7–15.7)
IMM GRANULOCYTES # BLD: 0 10E9/L (ref 0–0.4)
IMM GRANULOCYTES NFR BLD: 0 %
LYMPHOCYTES # BLD AUTO: 2.4 10E9/L (ref 0.8–5.3)
LYMPHOCYTES NFR BLD AUTO: 40.5 %
MCH RBC QN AUTO: 31.3 PG (ref 26.5–33)
MCHC RBC AUTO-ENTMCNC: 33.5 G/DL (ref 31.5–36.5)
MCV RBC AUTO: 93 FL (ref 78–100)
MONOCYTES # BLD AUTO: 0.6 10E9/L (ref 0–1.3)
MONOCYTES NFR BLD AUTO: 10.5 %
NEUTROPHILS # BLD AUTO: 2.2 10E9/L (ref 1.6–8.3)
NEUTROPHILS NFR BLD AUTO: 37.1 %
NRBC # BLD AUTO: 0 10*3/UL
NRBC BLD AUTO-RTO: 0 /100
PLATELET # BLD AUTO: 430 10E9/L (ref 150–450)
POTASSIUM SERPL-SCNC: 4.3 MMOL/L (ref 3.4–5.3)
PROT SERPL-MCNC: 7.8 G/DL (ref 6.8–8.8)
RBC # BLD AUTO: 4.51 10E12/L (ref 3.8–5.2)
SODIUM SERPL-SCNC: 138 MMOL/L (ref 133–144)
WBC # BLD AUTO: 5.8 10E9/L (ref 4–11)

## 2018-09-14 ENCOUNTER — OFFICE VISIT (OUTPATIENT)
Dept: RHEUMATOLOGY | Facility: CLINIC | Age: 53
End: 2018-09-14
Attending: INTERNAL MEDICINE
Payer: COMMERCIAL

## 2018-09-14 VITALS
HEART RATE: 80 BPM | WEIGHT: 148.8 LBS | HEIGHT: 68 IN | OXYGEN SATURATION: 99 % | TEMPERATURE: 98.1 F | SYSTOLIC BLOOD PRESSURE: 109 MMHG | BODY MASS INDEX: 22.55 KG/M2 | DIASTOLIC BLOOD PRESSURE: 73 MMHG

## 2018-09-14 DIAGNOSIS — D89.84 IGG4-RELATED SCLEROSING DISEASE (H): Primary | ICD-10-CM

## 2018-09-14 LAB
IGG SERPL-MCNC: 1160 MG/DL (ref 695–1620)
IGG1 SER-MCNC: 416 MG/DL (ref 300–856)
IGG2 SER-MCNC: 384 MG/DL (ref 158–761)
IGG3 SER-MCNC: 83 MG/DL (ref 24–192)
IGG4 SER-MCNC: 95 MG/DL (ref 11–86)

## 2018-09-14 PROCEDURE — G0463 HOSPITAL OUTPT CLINIC VISIT: HCPCS | Mod: ZF

## 2018-09-14 ASSESSMENT — PAIN SCALES - GENERAL: PAINLEVEL: MODERATE PAIN (4)

## 2018-09-14 NOTE — MR AVS SNAPSHOT
After Visit Summary   9/14/2018    Dinora Mcghee    MRN: [id number]           Patient Information     Date Of Birth          1965        Visit Information        Provider Department      9/14/2018 11:30 AM Anita Becerra MD [facility] Rheumatology        Today's Diagnoses     IgG4-related sclerosing disease (H)    -  1      Care Instructions    Labs every 3 months, next due in December 2018    Recommend shingrix vaccine    Hydris colgate for dry mouth    Return in a year          Follow-ups after your visit        Follow-up notes from your care team     Return in about 1 year (around 9/14/2019).      Your next 10 appointments already scheduled     Sep 21, 2018  3:00 PM CDT   (Arrive by 2:45 PM)   Return Visit with Maria Elena Abdalla, PhD   [facility] ([facility])    [address] [facility]  3rd Floor  [facility] [id number] [phone]            Sep 13, 2019  2:00 PM CDT   (Arrive by 1:45 PM)   Return Visit with Anita Becerra MD   [facility] Rheumatology ([facility])    [address] [place] [address]  [address]  [facility] [address] [phone]              Future tests that were ordered for you today     Open Standing Orders        Priority Remaining Interval Expires Ordered    Comprehensive metabolic panel Routine 5/5 [id number] 9/14/2019 9/14/2018    CBC with platelets differential Routine 5/5 [id number] 9/14/2019 9/14/2018    Immunoglobulin G subclasses Routine 5/5 [id number] 9/14/2019 9/14/2018            Who to contact     If you have questions or need follow up information about today's clinic visit or your schedule please contact [facility] RHEUMATOLOGY directly at [phone].  Normal or non-critical lab and imaging results will be communicated to you by MyChart, letter or phone within 4 business days after the clinic has received the results. If you do not hear from us within 7 days, please contact the clinic  "through [email or web address] or phone. If you have a critical or abnormal lab result, we will notify you by phone as soon as possible.  Submit refill requests through [employer] or call your pharmacy and they will forward the refill request to us. Please allow 3 business days for your refill to be completed.          Additional Information About Your Visit        [email or web address] Information     [employer] gives you secure access to your electronic health record. If you see a primary care provider, you can also send messages to your care team and make appointments. If you have questions, please call your primary care clinic.  If you do not have a primary care provider, please call [phone] and they will assist you.        Care EveryWhere ID     This is your Care EveryWhere ID. This could be used by other organizations to access your [facility] medical records  [id number]        Your Vitals Were     Pulse Temperature Height Pulse Oximetry BMI (Body Mass Index)       80 98.1  F (36.7  C) (Oral) 1.727 m (5' 8\") 99% 22.62 kg/m2        Blood Pressure from Last 3 Encounters:   09/14/18 109/73   09/05/18 110/71   08/24/18 97/50    Weight from Last 3 Encounters:   09/14/18 67.5 kg (148 lb 12.8 oz)   09/05/18 68.7 kg (151 lb 6.4 oz)   08/24/18 70.4 kg (155 lb 3.2 oz)               Primary Care Provider Office Phone # Fax #    Latasha Paul, APRN [phone]       [address] [place] [address] [address] [place] [id number]        Equal Access to Services     ABHAY HUITRON : Hadii monty johnsono Sopooja, waaxda luqadaha, qaybta kaalmada kellie, ivan wallace . So [facility] [phone].    ATENCIÓN: Si habla español, tiene a muñoz disposición servicios gratuitos de asistencia lingüística. Llame al [phone].    We comply with applicable federal civil rights laws and [place] laws. We do not discriminate on the basis of race, color, national origin, age, disability, sex, sexual orientation, or gender identity.       "      Thank you!     Thank you for choosing [facility] RHEUMATOLOGY  for your care. Our goal is always to provide you with excellent care. Hearing back from our patients is one way we can continue to improve our services. Please take a few minutes to complete the written survey that you may receive in the mail after your visit with us. Thank you!             Your Updated Medication List - Protect others around you: Learn how to safely use, store and throw away your medicines at www.disposemymeds.org.          This list is accurate as of 9/14/18 12:43 PM.  Always use your most recent med list.                   Brand Name Dispense Instructions for use Diagnosis    acetaminophen 500 MG Caps      Take 2 mg by mouth 2 times daily        * amitriptyline 50 MG tablet    ELAVIL    60 tablet    Take 1 tablet (50 mg) by mouth At Bedtime    Headache disorder       * amitriptyline 10 MG tablet    ELAVIL          amylase-lipase-protease 07004-10857 units Cpep per EC capsule    CREON 24    450 capsule    Take 3-4 capsules by mouth with meals and 1-2 with snacks. Maximum 15 capsules per day.    Acquired total absence of pancreas       aspirin 81 MG EC tablet     90 tablet    Take 1 tablet (81 mg) by mouth daily    High platelet count (H)       azelastine 0.1 % nasal spray    ASTELIN     Spray 1 spray into both nostrils 2 times daily        BD SHARPS  Misc     1 each    1 Container as needed    Post-pancreatectomy diabetes (H)       blood glucose monitoring lancets     1 Box    Use to test blood sugar 6-8 times daily or as directed.    Acute on chronic pancreatitis (H)       blood glucose monitoring test strip    PAT CONTOUR NEXT    2 Box    Use to test blood sugar 6 times daily or as directed.    Post-pancreatectomy diabetes (H)       cetirizine 10 MG tablet    zyrTEC     Take 10 mg by mouth daily    Elevated liver enzymes       diphenhydrAMINE 25 MG capsule    BENADRYL ALLERGY    56 capsule    Take 1 capsule (25 mg) by  mouth every 6 hours as needed for itching or allergies    Itching, Post-pancreatectomy diabetes (H), History of pancreatectomy, Pancreatic insufficiency       docusate sodium 100 MG capsule    COLACE    60 capsule    Take 1 capsule (100 mg) by mouth 2 times daily as needed for constipation,    Itching, Post-pancreatectomy diabetes (H), History of pancreatectomy, Pancreatic insufficiency       FOLIC ACID PO      Take 1 mg by mouth daily        glucose 40 % (400 mg/mL) Gel gel     3 Tube    Take 15-30 g by mouth every 15 minutes as needed for low blood sugar    Acquired total absence of pancreas       HYDROmorphone 2 MG tablet    DILAUDID    15 tablet    Take 0.5-1 tablets (1-2 mg) by mouth every 3 hours as needed for moderate to severe pain    Acute post-operative pain       ibuprofen 400 MG tablet    ADVIL/MOTRIN    30 tablet    Take 1 tablet (400 mg) by mouth every 6 hours as needed for moderate pain    Acute post-operative pain       LORazepam 1 MG tablet    ATIVAN    10 tablet    Take 1 tablet (1 mg) by mouth every 6 hours as needed for anxiety    Adjustment disorder with anxious mood       Lubiprostone 8 MCG Caps capsule    AMITIZA    150 capsule    Take 5 capsules (40 mcg) by mouth daily Take 3 capsules in AM and 2 capsules in PM    Other constipation       multivitamin CF formula capsule    CHOICEFUL     Take 1 tablet by mouth daily    Itching, Post-pancreatectomy diabetes (H), History of pancreatectomy, Pancreatic insufficiency       omeprazole 40 MG capsule    priLOSEC    90 capsule    Take 1 capsule (40 mg) by mouth daily    Gastroesophageal reflux disease without esophagitis       order for DME     1 Device    Equipment being ordered:Cefaly    Headache disorder       prochlorperazine 5 MG tablet    COMPAZINE    90 tablet    Take two 5 mg tablets by mouth every six hours as needed for nausea.    Itching, Post-pancreatectomy diabetes (H), History of pancreatectomy, Pancreatic insufficiency       ranitidine  150 MG tablet    ZANTAC    90 tablet    Take 1 tablet (150 mg) by mouth At Bedtime    Gastroesophageal reflux disease without esophagitis       senna-docusate 8.6-50 MG per tablet    SENOKOT-S;PERICOLACE    100 tablet    Take 1-2 tablets by mouth 2 times daily    Incisional hernia, without obstruction or gangrene       SYNTHROID 137 MCG tablet   Generic drug:  levothyroxine      Take 137 mcg by mouth daily        VAGIFEM 10 MCG Tabs vaginal tablet   Generic drug:  estradiol           ZOMIG 5 MG tablet   Generic drug:  ZOLMitriptan      Take 5 mg by mouth at onset of headache for migraine        * Notice:  This list has 2 medication(s) that are the same as other medications prescribed for you. Read the directions carefully, and ask your doctor or other care provider to review them with you.

## 2018-09-14 NOTE — LETTER
9/14/2018      RE: Dinora Mcghee  [address] [facility] [address]       Rheumatology Clinic Visit-Fellow Note     Dinora Mcghee MRN# [id number]   YOB: 1965 Age: 53 year old     Date of Visit: 09/14/2018   Last seen: 3/23/2018  Primary care provider: Justyna Lawson  Referring Provider: Dr. Ara Lugo with GI  Reason for Referral: Further evaluation and management for possible Sjogren's vs IgG4 related disease in patient with sicca symptoms and evidence of isolated? IgG4 in liver/pancreas.           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   # h/o endometriosis  # h/o pituitary mass with 2/2 DI now s/p transsphenoidal resection in 1990  # hypothyroidism on synthroid.  # Fatigue in ill state    Discussion:  52 y/o  female with longstanding history of chronic pancreatitis s/p pancreatectomy with islet autotransplant 12/2016 who is overall improving but found to have elevated serum and > 60 HPF cells on liver biopsy staining. She has sicca symptoms for the past 5 years but no evidence of Sjogren's vs IgG4 related disease on lip biopsy. No other evidence of IgG4 related activity with no RPF, aortitis, orbital disease, thyroid, pulmonary, or kidney disease. Interestingly, IgG4 disease can impact the hypophysis and she does have a history of pituitary issues back in 1990. Recent meta analysis assessing IgG4 serum sensitivity is 87.2% with 82% specificity (1). Her biopsy containing > 50 IgG4 staining plasma cells is highly suggestive of IgG4 related disease as well and meets recent proposed pathologic recommendations of IgG4 related disease (2). She meets many of the features for IgG4 related disease including tissue biopsy.     In regards to  management of her IgG4 Related disease there are no randomized control trials and all medicines are investigational. The most recent evidence highlights that the IgG4 antibodies are pathologic and that medications that suppress B lymphocytes like CD-20 cells (Rituximab) creating the antibodies are the preferred therapy. This is especially important if the organ dysfunction can be a critical organ like the liver in a patient with recent surgeries and transplant. Currently her liver dysfunction has normalized and there is no evidence of ongoing IgG4 related disease at this time.     Overall, we agree with the high levels of stress and degree of fatigue with worsening HA during active work we recommend she go very slow with her part time work. She has been severely ill and it will take time to regain and retrain her stamina.        Plan:  -- labs q3mo, due in Dec 2018, LFTs much improved from July to September 2018  -- agree with plan to continue to monitor off immune suppressives given ongoing stability in liver labs and IgG4 levels.   -- If liver dysfunction returns would need to decide with GI if we suspect related to IgG4 and if so then therapeutic options for IgG4 related disease would normally be Rituximab preferred (but has steroids (can cause irreversible damage to islet cells) with infusion) vs MMF (no steroids but not ideal agent).  -- Patient deferred initiating Evoxac or restasis at this point in time.  -- discussed various lozenges that help sicca symptoms, was advised to try hydris colgate toothpaste  -- f/u with GI for N/V  -- she will need slow reintroduction into work as she was severely ill.  --Shingrix vaccine. Recommended shingrix. CDC recommends this vaccine 2 doses,  by 2-6 months for adults 50 years and older. It is killed virus and ok to be used in immunocompromised patients. Shingrix reduces the risk of shingles and PHN by more than 90% in people 50 and older.     AE include: pain,  redness and swelling at the inj site, myalgia, fatigue, headaches, shivering, fever and stomach upset.    Follow up:  RTC in 12 months, unless new symptoms emerge.    Prevention:  Pneumovax PPSV23: 12/2016  PCV13 (Prevnar): not in [facility]  Tdap: 1/7/2008?  Influenza received 10/20/17  Shingles vaccine: not recieved  Reminded if on biologic no live vaccine (flu mist, shingles): n/a  HBVsAg: negative  HBVcore: ordered  HCV: negative   Q gold (or T spot): ordered  Bactrim prophylaxis: n/a  Fungal prophylaxis: n/a  Vitamin D and calcium/bone health: Ca and Vitamin D    Immunizations:  Immunization History   Administered Date(s) Administered     Hib (PRP-T) 12/01/2016     Influenza (IIV3) PF 09/29/2011, 11/15/2014, 10/21/2016     Influenza (intradermal) 11/01/2003, 09/29/2011     Influenza Vaccine IM 3yrs+ 4 Valent IIV4 10/24/2015, 10/21/2016, 10/20/2017     Influenza Vaccine, 3 YRS +, IM (QUADRIVALENT W/PRESERVATIVES) 11/04/2013     Meningococcal (Bexsero ) 12/09/2016     Meningococcal (Menactra ) 12/01/2016     Pneumo Conj 13-V (2010&after) 09/06/2017     Pneumococcal 23 valent 12/01/2016     TD (ADULT, 7+) 07/09/1998     TDAP Vaccine (Adacel) 01/07/2008     TDAP Vaccine (Boostrix) 07/31/2018       Orders Placed This Encounter   Procedures     Comprehensive metabolic panel     CBC with platelets differential     Immunoglobulin G subclasses               Active Problem List:     Patient Active Problem List    Diagnosis Date Noted     S/P hernia repair 08/23/2018     Priority: Medium     Adhesive capsulitis of shoulder, unspecified laterality 08/02/2018     Priority: Medium     Incisional hernia, without obstruction or gangrene 05/20/2018     Priority: Medium     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 06/26/2017     Priority: Medium     Gastroparesis      Priority: Medium     ACP (advance care planning) 03/28/2017     Priority: Medium     Advance Care Planning 3/28/2017: Receipt of ACP document:  Received: Health Care Directive  which was witnessed or notarized on 12/8/16.  Document previously scanned on 1/12/17.  Validation form completed and scanned.  Code Status reflects choices in most recent ACP document..  Confirmed/documented designated decision maker(s).  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 Provider to review and confirm.       GERD (gastroesophageal reflux disease) 12/01/2010     Priority: Medium     Lumbago 04/18/2005     Priority: Medium     History of L5-S1 degenerative disk disease.         Sprain and strain of other specified sites of hip and thigh 12/09/2002     Priority: Medium     Chondromalacia of patella 12/09/2002     Priority: Medium     Need for prophylactic immunotherapy      Priority: Medium     trees, grass, srw, dust mites, cat       Allergic rhinitis due to other allergen 12/21/2001     Priority: Medium     Hypothyroidism      Priority: Medium     Problem list name updated by automated process. Provider to review       Sensorineural hearing loss 01/10/2005     Priority: Medium     Problem list name updated by automated process. Provider to review       Vertiginous syndrome and labyrinthine disorder 01/10/2005     Priority: Medium     Problem list name updated by automated process. Provider to review              History of Present Illness:   Dinora Mcghee is a 52 year old female with a past medical history of hypothyroidism, h/o pituitary mass and secondary DI s/p transphenoidal resection 1990, HLD, left knee OA, lumbar DDD, chronic pancreatitis for 30 years, now s/p islet cell transplantation who  "presents today for further evaluation of elevated IgG4 found both in serum and liver biopsy as well as sicca symptoms.     She notes that she has had longstanding issues with pancreatitis without any mention of aortitis, RPF, urinary obstruction. She denies any history of cancers, lymphoma or leukemia. No family history of autoimmune conditions other than possible RA in her dad. She had distant DI secondary from pituitary mass (s/p removal and not needing hormones) and joint wise has longstanding OA of left knee. She mentions that for the past 5 years she has had dry eye requiring eye drops. Sometimes related to her allergies she has to now regularly use eye drops. \"Feels like sand\" is rubbing in eyes. She also noticed ~ 5 years ago dry mouth where a cracker would not dissolve. She has to constantly carry water and drink it to keep her mouth dry. She uses sugar free gum and lozenges. Most of her clinical symptom burden has been related to her pancreas with frequent ERCP and concern for Sphincter of Oddi dysfunction. She had issues with significant pancreatic insuffiencey with malnutrition and weight loss. She underwent islet cell autotransplantation 12/28/2017 and was found to have elevated IgG4 cells in her liver biopsy. Subsequent IgG4 levels were elevated.    Interim history: 6/20/2017 - 8/4/2017  Comes in today frustrated about the multiple denials from the insurance about Rituximab for her IgG4 Related disease. She has had recent elevations of her LFTs and mentions that the last 3 days she has started to feel worse like before her pancreatectomy. Some nausea, no vomiting, but her appetite is lower. She denies any jaundice, but does not itching of her skin. She denies any fevers, chills, but has had 1 day of watery, nonbloody diarrhea. She had a recent MRCP with signs of biliary dilatation at ducts within the liver. She has a new hyperenhancement lesion in her liver with stability of other lesions. She is " following with Dr. Lugo within the next week. She mentions that her dry eyes are getting worse.     Interim history: 8/4/2017 - 10/20/2017  After further discussion with GI and SOT regarding therapies Dinora's LFTs normalized and the consensus was to monitor and hold on immune suppressive therapies given the side effect profile. She has continued to improve and is now off insulin. She wonders about her known slow emptying an gastroparesis has been acting up and she has had issues with N/V the past 2 weeks particularly after she lays down (worse in AM, but best midday). Otherwise things have been going well, denies any infections and her shoulder is almost back to normal.     Interim History: 10/20/2017 - 3/23/2018  Returns today feeling better. She has adjusted her diet with great improvement. Has noticed some fatigue the last several weeks, but denies any infectious symptoms. Has intermittent sharp pains in her left lower rib chest wall. This has been responding to tylenol. Denies any fevers or chills. She has noticed fatigue and tries to be very active with walking 3 miles. She mentions that she has to take naps now she never had before. She has been back part time as well that has worsened her control over her HA as well as her stamina levels.     Today: Sicca sx are managable on biotene and systane.    Has RUQ pain and nausea x 7days. Has migraines today.          Review of Systems:   Constitutional: denies fevers/chills, positive fatigue, patient is having weight gain s/p transplant.  Skin: denies rash, raynauds or alopecia  Eyes: denies hx of uvetitis, double vision, positive dry eyes  Ears/Nose/Throat:  denies recurrent URI or sinusitis, positive dry mouth, denies any oral or nasal ulcers  Respiratory: No shortness of breath, dyspnea on exertion, cough, or hemoptysis  Cardiovascular:  denies chest pain, palpitations, hx pleurisy  Gastrointestinal: denies diarrhea, blood in stool, hx of IBD, positive past  pancreatitis, positive nausea, rare vomiting, positive RUQ pain.  Genitourinary: denies hematuria.  Musculoskeletal: denies red, tender, swollen joints, left shoulder frozen shoulder since surgery almost normal.  Neurologic:  denies headaches, seizures, some numbness or tingling s/p abdominal surgery.  Psychiatric:  denies history of depression or mental illness  Hematologic/Lymphatic/Immunologic:  denies history of blood clots, easy bruising, bleeding.  Endocrine:  Positive hypothyroidism.          Past Medical History:     Past Medical History:   Diagnosis Date     Allergic rhinitis, cause unspecified      Allergy to other foods     strawberries, apples, celeries, alice, watermelon     Arthritis     left knee     Choledocholithiasis     long after cholecystectomy     Chronic abdominal pain      Chronic constipation      Chronic nausea      Chronic pancreatitis (H)      Degeneration of lumbar or lumbosacral intervertebral disc      Esophageal reflux     w/ hiatal hernia     Gastroparesis      Hiatal hernia      History of pituitary adenoma     s/p resection     Hypothyroidism      Migraines      Mild hyperlipidemia      On tube feeding diet     presence of GJ tube     Pancreatic disease     PD stricture, 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    Past Surgical History  Past Surgical History:   Procedure Laterality Date     ABDOMEN SURGERY      c sections: 93, 96, 98. endometriosis growth     APPENDECTOMY       C  DELIVERY ONLY       C  DELIVERY ONLY      repeat c section with incidental cystotomy with repair     C EXCIS PITUITARY,TRANSNASAL/SEPTAL      pituitary tumor removed for diabetes insipidus     C TOTAL ABDOM HYSTERECTOMY      w/ bilateral salpingoophorectomy      C WRIST ARTHROSCOP,RELEASE XVERS LIG Bilateral  08      SECTION  1993     COLONOSCOPY       ENDOSCOPIC RETROGRADE CHOLANGIOPANCREATOGRAM N/A 2015    Procedure: ENDOSCOPIC RETROGRADE CHOLANGIOPANCREATOGRAM;  Surgeon: Mandeep Park MD;  Location: [facility]     ENDOSCOPIC RETROGRADE CHOLANGIOPANCREATOGRAM N/A 2016    Procedure: COMBINED ENDOSCOPIC RETROGRADE CHOLANGIOPANCREATOGRAPHY, PLACE TUBE/STENT;  Surgeon: Mandeep Park MD;  [facility]: [facility]     ENDOSCOPIC RETROGRADE CHOLANGIOPANCREATOGRAM N/A 3/17/2016    Procedure: COMBINED ENDOSCOPIC RETROGRADE CHOLANGIOPANCREATOGRAPHY, REMOVE FOREIGN BODY OR STENT/TUBE;  Surgeon: Mandeep Park MD;  Location: [facility]     ENDOSCOPIC RETROGRADE CHOLANGIOPANCREATOGRAM N/A 2016    Procedure: COMBINED ENDOSCOPIC RETROGRADE CHOLANGIOPANCREATOGRAPHY, PLACE TUBE/STENT;  Surgeon: Mandeep Park MD;  [facility]: [facility]     ENDOSCOPIC RETROGRADE CHOLANGIOPANCREATOGRAM N/A 2016    Procedure: COMBINED ENDOSCOPIC RETROGRADE CHOLANGIOPANCREATOGRAPHY, REMOVE FOREIGN BODY OR STENT/TUBE;  Surgeon: Mandeep Park MD;  Location: [facility]     ENDOSCOPIC ULTRASOUND UPPER GASTROINTESTINAL TRACT (GI) N/A 10/3/2016    Procedure: ENDOSCOPIC ULTRASOUND, ESOPHAGOSCOPY / UPPER GASTROINTESTINAL TRACT (GI);  Surgeon: Guru Jose Rodas MD;  Location: [facility]     ESOPHAGOSCOPY, GASTROSCOPY, DUODENOSCOPY (EGD), COMBINED N/A 2015    Procedure: COMBINED ESOPHAGOSCOPY, GASTROSCOPY, DUODENOSCOPY (EGD), REMOVE FOREIGN BODY;  Surgeon: Mandeep Park MD;  [facility]     ESOPHAGOSCOPY, GASTROSCOPY, DUODENOSCOPY (EGD), COMBINED N/A 10/25/2015    Procedure: COMBINED ESOPHAGOSCOPY, GASTROSCOPY, DUODENOSCOPY (EGD);  Surgeon: Sammy Amaro MD;  [facility]     ESOPHAGOSCOPY, GASTROSCOPY, DUODENOSCOPY (EGD), COMBINED N/A 10/25/2015    Procedure: COMBINED ESOPHAGOSCOPY, GASTROSCOPY, DUODENOSCOPY (EGD), BIOPSY SINGLE OR MULTIPLE;  Surgeon: Sammy Amaro,  MD;  Location: [facility]     ESOPHAGOSCOPY, GASTROSCOPY, DUODENOSCOPY (EGD), DILATATION, COMBINED       EXCISE LESION TRUNK N/A 4/17/2017    Procedure: EXCISE LESION TRUNK;  Removal of Abdominal Foreign Body;  Surgeon: Nestor Phoenix MD;  [facility]     HC ESOPH/GAS REFLUX TEST W NASAL IMPED >1 HR N/A 11/19/2015    Procedure: ESOPHAGEAL IMPEDENCE FUNCTION TEST WITH 24 HOUR PH GREATER THAN 1 HOUR;  Surgeon: Thiago Apple MD;  Location: [facility]     HC [facility] ENDOSCOPY DIAG W BIOPSY  9/17/08     HC UGI ENDOSCOPY DIAG W BIOPSY  9/27/12     HC UGI ENDOSCOPY W ESOPHAGEAL DILATION BALLOON <30MM  9/17/08     HC UGI ENDOSCOPY W EUS N/A 5/5/2015    Procedure: COMBINED ENDOSCOPIC ULTRASOUND, ESOPHAGOSCOPY, GASTROSCOPY, DUODENOSCOPY (EGD);  Surgeon: Wm Dueñas MD;  Location: [facility]     INJECT TRANSVERSUS ABDOMINIS PLANE (TAP) BLOCK BILATERAL Left 9/22/2016    Procedure: INJECT TRANSVERSUS ABDOMINIS PLANE (TAP) BLOCK BILATERAL;  Surgeon: Dickson Corrigan MD;  [facility]     laparoscopic pineda  1995     LAPAROSCOPIC HERNIORRHAPHY INCISIONAL N/A 8/23/2018    Procedure: LAPAROSCOPIC HERNIORRHAPHY INCISIONAL;  Laparoscopic Incisional Hernia Repair with Symbotex Mesh Implant;  Surgeon: Nestor Phoenix MD;  [facility]     LAPAROSCOPIC PANCREATECTOMY, TRANSPLANT AUTO ISLET CELL N/A 12/28/2016    Procedure: LAPAROSCOPIC PANCREATECTOMY, TRANSPLANT AUTO ISLET CELL;  Surgeon: Nestor Phoenix MD;  [facility]     transphenoidal pituitary resection  1990            Social History:     Social History     Occupational History     director [employer]     Social History Main Topics     Smoking status: Former Smoker     Packs/day: 0.50     Years: 6.00     Types: Cigarettes     Start date: 9/1/1985     Quit date: 1/1/1992     Smokeless tobacco: Never Used      Comment: no 2nd hand     Alcohol use 1.8 - 3.6 oz/week     0 Standard drinks or equivalent, 1 - 2 Glasses of wine, 1 - 2 Cans of beer, 1 - 2 Shots of liquor per week       Comment: occasional     Drug use: No     Sexual activity: Yes     Partners: Male     Birth control/ protection: None      Comment: Hystectomy 1999   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 6 years of smoking and quit in 1992. Denies any ETOH.  and lives in the [facility].          Family History:     Family History   Problem Relation Age of Onset     Eye Disorder Father      cataract, detached retina     Myocardial Infarction Father 60     Lipids Father      Cerebrovascular Disease Father      Depression Father      Substance Abuse Father      Anesthesia Reaction Father      stroke right after surgery     Lipids Mother      Hypertension Mother      Thyroid Disease Mother      Eye Disorder Son      ptosis     Eye Disorder Paternal Grandmother      cataract     Eye Disorder Paternal Grandfather      cataract     Diabetes Paternal Grandfather      Eye Disorder Maternal Grandmother      cataract     Thyroid Disease Maternal Grandmother      Coronary Artery Disease Sister      Depression Sister      Depression Son      Anxiety Disorder Son      Thyroid Disease Sister      Diabetes Maternal Grandfather      Depression Nephew      Anxiety Disorder Nephew      Thyroid Disease Nephew      Type 2 Diabetes Cousin      paternal cousin     Father with likely RA following with multiple rheumatologists. 3 children healthy. 1 sister with thyroid issues. Denies any Sjogren's SLE, Scleroderma.            Allergies:     Allergies   Allergen Reactions     Apple Anaphylaxis     Depakote [Divalproex Sodium] Other (See Comments)     Chest pain     Zithromax [Azithromycin Dihydrate] Anaphylaxis     Noted in 4/7/08 ER     Darvocet [Propoxyphene N-Apap] Itching     Morphine Nausea and Vomiting and Rash     Nalbuphine Hcl Rash     RASH :nubaine     Zosyn [Piperacillin-Tazobactam In D5w] Rash     Possible allergy, did have a diffuse rash that seemed drug related but could have also been related to soap in  the hospital.      Bactrim [Sulfamethoxazole W-Trimethoprim] Other (See Comments) and Nausea and Vomiting     Severely low liver function.     Cats      Compazine [Prochlorperazine] Other (See Comments)     Twitching. Takes Benedryl and is fine     Corticosteroids Other (See Comments)     All oral,IV and injectable steroids are contraindicated (unless in life threatening situations) in Islet Auto transplant recipients. They can cause irreversible loss of islet cell function. Please contact patients transplant care coordinator Mecca Watkins RN BSN @ [phone]/Pager [phone], and Endocrinologist prior to administration.     Dust Mites      Grass      Prednisone Other (See Comments)     Insomnia       Ragweeds      Tape [Adhesive Tape] Blisters     Tramadol      Trees      Zofran [Ondansetron] Other (See Comments)     migraine     Flagyl [Metronidazole] Hives and Rash            Medications:     Current Outpatient Prescriptions   Medication Sig Dispense Refill     acetaminophen 500 MG CAPS Take 2 mg by mouth 2 times daily       amitriptyline (ELAVIL) 10 MG tablet   0     amitriptyline (ELAVIL) 50 MG tablet Take 1 tablet (50 mg) by mouth At Bedtime 60 tablet 3     amylase-lipase-protease (CREON 24) 73724-75955 UNITS CPEP per EC capsule Take 3-4 capsules by mouth with meals and 1-2 with snacks. Maximum 15 capsules per day. 450 capsule 6     aspirin 81 MG EC tablet Take 1 tablet (81 mg) by mouth daily 90 tablet 3     azelastine (ASTELIN) 0.1 % spray Spray 1 spray into both nostrils 2 times daily       blood glucose monitoring (PAT CONTOUR NEXT) test strip Use to test blood sugar 6 times daily or as directed. 2 Box 11     blood glucose monitoring (PAT MICROLET) lancets Use to test blood sugar 6-8 times daily or as directed. 1 Box prn     cetirizine (ZYRTEC) 10 MG tablet Take 10 mg by mouth daily       diphenhydrAMINE (BENADRYL ALLERGY) 25 MG capsule Take 1 capsule (25 mg) by mouth every 6 hours as needed for  "itching or allergies 56 capsule 0     docusate sodium (COLACE) 100 MG capsule Take 1 capsule (100 mg) by mouth 2 times daily as needed for constipation, 60 capsule 0     estradiol (VAGIFEM) 10 MCG TABS vaginal tablet        FOLIC ACID PO Take 1 mg by mouth daily       glucose 40 % GEL gel Take 15-30 g by mouth every 15 minutes as needed for low blood sugar 3 Tube 2     ibuprofen (ADVIL/MOTRIN) 400 MG tablet Take 1 tablet (400 mg) by mouth every 6 hours as needed for moderate pain 30 tablet 0     levothyroxine (SYNTHROID) 137 MCG tablet Take 137 mcg by mouth daily       Lubiprostone (AMITIZA) 8 MCG CAPS capsule Take 5 capsules (40 mcg) by mouth daily Take 3 capsules in AM and 2 capsules in  capsule 3     multivitamin CF formula (CHOICEFUL) capsule Take 1 tablet by mouth daily  11     omeprazole (PRILOSEC) 40 MG capsule Take 1 capsule (40 mg) by mouth daily 90 capsule 3     order for DME Equipment being ordered:Cefaly 1 Device 0     prochlorperazine (COMPAZINE) 5 MG tablet Take two 5 mg tablets by mouth every six hours as needed for nausea. 90 tablet 3     ranitidine (ZANTAC) 150 MG tablet Take 1 tablet (150 mg) by mouth At Bedtime 90 tablet 3     senna-docusate (SENOKOT-S;PERICOLACE) 8.6-50 MG per tablet Take 1-2 tablets by mouth 2 times daily 100 tablet 0     Sharps Container (BD SHARPS ) MISC 1 Container as needed 1 each 1     ZOLMitriptan (ZOMIG) 5 MG tablet Take 5 mg by mouth at onset of headache for migraine       HYDROmorphone (DILAUDID) 2 MG tablet Take 0.5-1 tablets (1-2 mg) by mouth every 3 hours as needed for moderate to severe pain (Patient not taking: Reported on 9/5/2018) 15 tablet 0     LORazepam (ATIVAN) 1 MG tablet Take 1 tablet (1 mg) by mouth every 6 hours as needed for anxiety (Patient not taking: Reported on 9/5/2018) 10 tablet 0            Physical Exam:   Blood pressure 109/73, pulse 80, temperature 98.1  F (36.7  C), temperature source Oral, height 1.727 m (5' 8\"), weight 67.5 kg " (148 lb 12.8 oz), SpO2 99 %, not currently breastfeeding.  Wt Readings from Last 4 Encounters:   09/14/18 67.5 kg (148 lb 12.8 oz)   09/05/18 68.7 kg (151 lb 6.4 oz)   08/24/18 70.4 kg (155 lb 3.2 oz)   07/31/18 68.3 kg (150 lb 9.6 oz)     Ideal body weight: 63.9 kg (140 lb 14 oz)  Adjusted ideal body weight: 65.3 kg (144 lb 0.7 oz)    Constitutional: well-developed, appearing stated age; cooperative  Eyes: nl EOM, conjunctiva, sclera. No icterus.   ENT: nl external ears, nose, lips, teeth, gums, throat  No mucous membrane lesions, dry saliva pool, no parotid enlargement  Neck: no mass or thyroid enlargement  Resp: lungs clear to auscultation,  CV: RRR, no murmurs, rubs or gallops, no edema  GI: No Right sided or Lower quadrant tenderness.  Lymph: no cervical, supraclavicular nodes  MS: no active synovitis or joint tenderness  Skin: no nail pitting, alopecia, rash, nodules or lesions  Neuro: nl cranial nerves, strength in both proximal and distal UE and LE.   Psych: nl affect.         Data:     Results for orders placed or performed in visit on 09/13/18   XR Abdomen 2 Views    Narrative    Exam: XR ABDOMEN 2 VW, 9/13/2018 3:23 PM    Indication: ; Abdominal pain    Comparison: Abdominal MRI 7/13/2017    Findings:   Supine and upright views of the abdomen. Numerous surgical clips in  the upper abdomen. Surgical sutures noted in the right hemiabdomen  adjacent to the spine as well as the left upper quadrant. Moderate  colonic stool burden. Bowel gas pattern is nonobstructive. No  pneumatosis. No portal venous gas. Pelvic phleboliths. Lung bases are  clear.      Impression    Impression: Moderate colonic stool burden. Nonobstructive bowel gas  pattern.    I have personally reviewed the examination and initial interpretation  and I agree with the findings.    AMARI OTERO MD     *Note: Due to a large number of results and/or encounters for the requested time period, some results have not been displayed. A complete  set of results can be found in Results Review.       Recent Labs   Lab Test  04/25/17   1355  04/04/17   0757  02/21/17   1315   12/29/16   0620   06/06/15   1903   10/23/12   1451   WBC  4.4  5.0  4.7   < >  10.7   < >   --    < >   --    RBC  4.38  4.04  4.28   < >  3.13*   < >   --    < >   --    HGB  11.1*  10.4*  11.6*   < >  9.1*   < >   --    < >   --    HCT  36.1  33.4*  37.1   < >  27.7*   < >   --    < >   --    MCV  82  83  87   < >  89   < >   --    < >   --    RDW  17.5*  15.8*  13.0   < >  13.2   < >   --    < >   --    PLT  543*  482*  733*   < >  107*   < >   --    < >   --    ALBUMIN  3.7  3.2*  3.7   < >  2.9*   < >  2.8*   < >  4.4   CRP   --    --    --    --   90.0*   --   71.0*   --   7.2   BUN  16  17  13   < >  7   < >  2*   < >   --     < > = values in this interval not displayed.      Recent Labs   Lab Test  12/20/16   1121  10/23/15   1554  10/09/13   0721   06/25/09   1423   TSH  0.13*  0.57  1.86   < >  0.37*   T4  1.17   --    --    --   1.17    < > = values in this interval not displayed.     Hemoglobin   Date Value Ref Range Status   09/13/2018 14.1 11.7 - 15.7 g/dL Final   08/24/2018 13.5 11.7 - 15.7 g/dL Final   08/23/2018 13.0 11.7 - 15.7 g/dL Final     Urea Nitrogen   Date Value Ref Range Status   09/13/2018 12 7 - 30 mg/dL Final   07/31/2018 14 7 - 30 mg/dL Final   03/23/2018 12 7 - 30 mg/dL Final     Creatinine   Date Value Ref Range Status   05/20/2017 0.6 0.52 - 1.04 mg/dL Final     Sed Rate   Date Value Ref Range Status   10/23/2012 11 0 - 20 mm/h Final   07/27/2006 9 0 - 20 mm/h Final     CRP Inflammation   Date Value Ref Range Status   12/29/2016 90.0 (H) 0.0 - 8.0 mg/L Final   06/06/2015 71.0 (H) 0.0 - 8.0 mg/L Final   10/23/2012 7.2 0.0 - 8.0 mg/L Final     AST   Date Value Ref Range Status   09/13/2018 39 0 - 45 U/L Final   07/31/2018 69 (H) 0 - 45 U/L Final   03/23/2018 44 0 - 45 U/L Final     Albumin   Date Value Ref Range Status   09/13/2018 3.9 3.4 - 5.0 g/dL Final    07/31/2018 3.7 3.4 - 5.0 g/dL Final   03/23/2018 3.9 3.4 - 5.0 g/dL Final     Alkaline Phosphatase   Date Value Ref Range Status   09/13/2018 145 40 - 150 U/L Final   07/31/2018 170 (H) 40 - 150 U/L Final   03/23/2018 160 (H) 40 - 150 U/L Final     ALT   Date Value Ref Range Status   09/13/2018 59 (H) 0 - 50 U/L Final   07/31/2018 77 (H) 0 - 50 U/L Final   03/23/2018 64 (H) 0 - 50 U/L Final     Rheumatoid Factor   Date Value Ref Range Status   04/10/2017 <20 <20 IU/mL Final     Recent Labs   Lab Test  09/13/18   1449  08/24/18   0828  08/23/18   1238   07/31/18   1552  03/23/18   0958   12/20/16   1121   10/23/15   1554   WBC  5.8   --    --    --   5.6  4.1   < >   --    < >  4.8   HGB  14.1  13.5  13.0   < >  13.7  13.9   < >   --    < >  13.3   HCT  42.1  41.7  39.6   < >  41.7  41.7   < >   --    < >  39.1   MCV  93   --    --    --   94  94   < >   --    < >  89   PLT  430   --    --    --   377  382   < >   --    < >  212   BUN  12   --    --    --   14  12   < >   --    < >  10   TSH   --    --    --    --    --   0.74   --   0.13*   --   0.57   AST  39   --    --    --   69*  44   < >   --    < >  56*   ALT  59*   --    --    --   77*  64*   < >   --    < >  79*   ALKPHOS  145   --    --    --   170*  160*   < >   --    < >  206*    < > = values in this interval not displayed.     AMARI undetected  SSA, SSB, cryoglobulin all negative  Normal C3, C4  RF undetected  IGG subclasses with mild IGG4 elevation to 146H -> 89 -> 70s  SPEP with normal pattern and no monoclonal protein seen.    Recent LFT normal with declining Alk Phos. Lowest in past year  IgG4 normalizing    LFTs, CBC, IgG subclasses normal 1/2018.    Other studies:   Tissue biopsy 12/28/2016:  FINAL DIAGNOSIS:   A. Spleen, splenectomy:   - Splenic tissue with no significant histologic abnormality     B. Duodenum and pancreas, resection:   - Chronic pancreatitis   - Duodenum with no significant histologic abnormality     C. Pancreas, uncinate  process, biopsy:   - Chronic pancreatitis     D. Liver, dome lesion, needle core biopsy:   - Liver with spindle cell proliferation/lesion, acute and chronic   inflammation and fibrosis   - Focally increased IgG4 positive plasma cells (upto 60/HPF)   - See comment     E. Pancreas, head, biopsy:   - Chronic pancreatitis     F. Pancreas, tail, biopsy:   - Chronic pancreatitis     G. Pancreas, body, biopsy:   - Chronic pancreatitis with fat necrosis     COMMENT:   Sections of the liver lesion show stromal proliferation composed of   bland spindle cell bundle and whorls with associated fibrosis   infiltrated by neutrophils, lymphocytes, plasma cells and macrophages.   Occasional lymphoid aggregates are noted. The surrounding liver   parenchymal tissue show diffuse moderate portal inflammation composed of   lymphocytes and plasma cells.  There is also a mild to moderate   infiltrate of neutrophils.  Reticulin stain shows preserved reticulin   framework.  Trichrome stains highlight the spindle cell proliferation   and show mild portal fibrosis.  Immunostain are performed with   appropriate controls. The spindle cell proliferation is negative for ALK   and show patchy infiltrate of IgG4 positive plasma cells (upto 60/HPF).   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 Possibility of IgG4-related disease cannot be ruled out.   Clinical and radiologic correlation is recommended.     MRI Abdomen:4/22/2017  IMPRESSION:  1. 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  2. No significant change in small wedge-shaped peripheral areas of  persistent enhancement surrounding mildly dilated biliary radicles,  findings most compatible with cholangitis or sequela of previous  infection.  3. Nonspecific subcutaneous fluid collection in the left upper  quadrant amidst the abdominal wall postsurgical changes.    Reviewed Rheumatology lab flowsheet    MRCP  7/13/2017  IMPRESSION:  1. Findings consistent with hemosiderin deposition within the liver.  2. There is improvement in the heterogeneous enhancement seen on  comparison exam, though patchy areas of increased T2 signal and  persistent enhancement are seen in the liver suggestive of regions of  fibrosis. Mildly dilated bile ducts in the regions of the liver which  appears somewhat fibrotic, overall similar to prior exam.  3. Postsurgical changes of pancreatectomy, splenectomy and  cholecystectomy.    Lip Biopsy 5/31/2017:  FINAL DIAGNOSIS:   Lip biopsy, lower:   -  Benign minor salivary glands with normal lobular architecture, see   comment     COMMENT:   There is focal mild chronic inflammation.  There is no morphologic   evidence of IgG-4 related disease or Sjogren's disease.     Component      Latest Ref Rng & Units 7/31/2018 8/23/2018 8/23/2018 8/23/2018            5:30 AM 10:32 AM 10:35 AM   WBC      4.0 - 11.0 10e9/L 5.6      RBC Count      3.8 - 5.2 10e12/L 4.43      Hemoglobin      11.7 - 15.7 g/dL 13.7  13.0    Hematocrit      35.0 - 47.0 % 41.7  39.7    MCV      78 - 100 fl 94      MCH      26.5 - 33.0 pg 30.9      MCHC      31.5 - 36.5 g/dL 32.9      RDW      10.0 - 15.0 % 13.9      Platelet Count      150 - 450 10e9/L 377      Diff Method             % Neutrophils      %       % Lymphocytes      %       % Monocytes      %       % Eosinophils      %       % Basophils      %       % Immature Granulocytes      %       Nucleated RBCs      0 /100       Absolute Neutrophil      1.6 - 8.3 10e9/L       Absolute Lymphocytes      0.8 - 5.3 10e9/L       Absolute Monocytes      0.0 - 1.3 10e9/L       Absolute Eosinophils      0.0 - 0.7 10e9/L       Absolute Basophils      0.0 - 0.2 10e9/L       Abs Immature Granulocytes      0 - 0.4 10e9/L       Absolute Nucleated RBC             Sodium      133 - 144 mmol/L 138      Potassium      3.4 - 5.3 mmol/L 3.9      Chloride      94 - 109 mmol/L 105      Carbon Dioxide       20 - 32 mmol/L 28      Anion Gap      3 - 14 mmol/L 5      Glucose      70 - 99 mg/dL 91 87  94   Urea Nitrogen      7 - 30 mg/dL 14      Creatinine      0.52 - 1.04 mg/dL 0.68      GFR Estimate      >60 mL/min/1.7m2 >90      GFR Estimate If Black      >60 mL/min/1.7m2 >90      Calcium      8.5 - 10.1 mg/dL 9.0      Bilirubin Total      0.2 - 1.3 mg/dL 0.7      Albumin      3.4 - 5.0 g/dL 3.7      Protein Total      6.8 - 8.8 g/dL 7.6      Alkaline Phosphatase      40 - 150 U/L 170 (H)      ALT      0 - 50 U/L 77 (H)      AST      0 - 45 U/L 69 (H)      Color Urine       Straw      Appearance Urine       Clear      Glucose Urine      NEG:Negative mg/dL Negative      Bilirubin Urine      NEG:Negative Negative      Ketones Urine      NEG:Negative mg/dL Negative      Specific Gravity Urine      1.003 - 1.035 1.003      Blood Urine      NEG:Negative Negative      pH Urine      5.0 - 7.0 pH 7.0      Protein Albumin Urine      NEG:Negative mg/dL Negative      Urobilinogen mg/dL      0.0 - 2.0 mg/dL 0.0      Nitrite Urine      NEG:Negative Negative      Leukocyte Esterase Urine      NEG:Negative Negative      Source       Midstream Urine      WBC Urine      0 - 5 /HPF <1      RBC Urine      0 - 2 /HPF <1      Bacteria Urine      NEG:Negative /HPF Few (A)      Mucous Urine      NEG:Negative /LPF Present (A)      Bilirubin Direct      0.0 - 0.2 mg/dL       IGG      695 - 1620 mg/dL       IgG1      300 - 856 mg/dL       IgG2      158 - 761 mg/dL       IgG3      24 - 192 mg/dL       IgG4      11 - 86 mg/dL         Component      Latest Ref Rng & Units 8/23/2018 8/23/2018 8/24/2018          12:38 PM  2:46 PM    WBC      4.0 - 11.0 10e9/L      RBC Count      3.8 - 5.2 10e12/L      Hemoglobin      11.7 - 15.7 g/dL 13.0  13.5   Hematocrit      35.0 - 47.0 % 39.6  41.7   MCV      78 - 100 fl      MCH      26.5 - 33.0 pg      MCHC      31.5 - 36.5 g/dL      RDW      10.0 - 15.0 %      Platelet Count      150 - 450 10e9/L      Diff  Method            % Neutrophils      %      % Lymphocytes      %      % Monocytes      %      % Eosinophils      %      % Basophils      %      % Immature Granulocytes      %      Nucleated RBCs      0 /100      Absolute Neutrophil      1.6 - 8.3 10e9/L      Absolute Lymphocytes      0.8 - 5.3 10e9/L      Absolute Monocytes      0.0 - 1.3 10e9/L      Absolute Eosinophils      0.0 - 0.7 10e9/L      Absolute Basophils      0.0 - 0.2 10e9/L      Abs Immature Granulocytes      0 - 0.4 10e9/L      Absolute Nucleated RBC            Sodium      133 - 144 mmol/L      Potassium      3.4 - 5.3 mmol/L      Chloride      94 - 109 mmol/L      Carbon Dioxide      20 - 32 mmol/L      Anion Gap      3 - 14 mmol/L      Glucose      70 - 99 mg/dL  104 (H) 85   Urea Nitrogen      7 - 30 mg/dL      Creatinine      0.52 - 1.04 mg/dL      GFR Estimate      >60 mL/min/1.7m2      GFR Estimate If Black      >60 mL/min/1.7m2      Calcium      8.5 - 10.1 mg/dL      Bilirubin Total      0.2 - 1.3 mg/dL      Albumin      3.4 - 5.0 g/dL      Protein Total      6.8 - 8.8 g/dL      Alkaline Phosphatase      40 - 150 U/L      ALT      0 - 50 U/L      AST      0 - 45 U/L      Color Urine            Appearance Urine            Glucose Urine      NEG:Negative mg/dL      Bilirubin Urine      NEG:Negative      Ketones Urine      NEG:Negative mg/dL      Specific Gravity Urine      1.003 - 1.035      Blood Urine      NEG:Negative      pH Urine      5.0 - 7.0 pH      Protein Albumin Urine      NEG:Negative mg/dL      Urobilinogen mg/dL      0.0 - 2.0 mg/dL      Nitrite Urine      NEG:Negative      Leukocyte Esterase Urine      NEG:Negative      Source            WBC Urine      0 - 5 /HPF      RBC Urine      0 - 2 /HPF      Bacteria Urine      NEG:Negative /HPF      Mucous Urine      NEG:Negative /LPF      Bilirubin Direct      0.0 - 0.2 mg/dL      IGG      695 - 1620 mg/dL      IgG1      300 - 856 mg/dL      IgG2      158 - 761 mg/dL      IgG3      24 -  192 mg/dL      IgG4      11 - 86 mg/dL        Component      Latest Ref Rng & Units 9/13/2018             WBC      4.0 - 11.0 10e9/L 5.8   RBC Count      3.8 - 5.2 10e12/L 4.51   Hemoglobin      11.7 - 15.7 g/dL 14.1   Hematocrit      35.0 - 47.0 % 42.1   MCV      78 - 100 fl 93   MCH      26.5 - 33.0 pg 31.3   MCHC      31.5 - 36.5 g/dL 33.5   RDW      10.0 - 15.0 % 13.2   Platelet Count      150 - 450 10e9/L 430   Diff Method       Automated Method   % Neutrophils      % 37.1   % Lymphocytes      % 40.5   % Monocytes      % 10.5   % Eosinophils      % 9.8   % Basophils      % 2.1   % Immature Granulocytes      % 0.0   Nucleated RBCs      0 /100 0   Absolute Neutrophil      1.6 - 8.3 10e9/L 2.2   Absolute Lymphocytes      0.8 - 5.3 10e9/L 2.4   Absolute Monocytes      0.0 - 1.3 10e9/L 0.6   Absolute Eosinophils      0.0 - 0.7 10e9/L 0.6   Absolute Basophils      0.0 - 0.2 10e9/L 0.1   Abs Immature Granulocytes      0 - 0.4 10e9/L 0.0   Absolute Nucleated RBC       0.0   Sodium      133 - 144 mmol/L 138   Potassium      3.4 - 5.3 mmol/L 4.3   Chloride      94 - 109 mmol/L 102   Carbon Dioxide      20 - 32 mmol/L 30   Anion Gap      3 - 14 mmol/L 5   Glucose      70 - 99 mg/dL 119 (H)   Urea Nitrogen      7 - 30 mg/dL 12   Creatinine      0.52 - 1.04 mg/dL 0.83   GFR Estimate      >60 mL/min/1.7m2 72   GFR Estimate If Black      >60 mL/min/1.7m2 87   Calcium      8.5 - 10.1 mg/dL 9.2   Bilirubin Total      0.2 - 1.3 mg/dL 0.8   Albumin      3.4 - 5.0 g/dL 3.9   Protein Total      6.8 - 8.8 g/dL 7.8   Alkaline Phosphatase      40 - 150 U/L 145   ALT      0 - 50 U/L 59 (H)   AST      0 - 45 U/L 39   Color Urine          Appearance Urine          Glucose Urine      NEG:Negative mg/dL    Bilirubin Urine      NEG:Negative    Ketones Urine      NEG:Negative mg/dL    Specific Gravity Urine      1.003 - 1.035    Blood Urine      NEG:Negative    pH Urine      5.0 - 7.0 pH    Protein Albumin Urine      NEG:Negative mg/dL     Urobilinogen mg/dL      0.0 - 2.0 mg/dL    Nitrite Urine      NEG:Negative    Leukocyte Esterase Urine      NEG:Negative    Source          WBC Urine      0 - 5 /HPF    RBC Urine      0 - 2 /HPF    Bacteria Urine      NEG:Negative /HPF    Mucous Urine      NEG:Negative /LPF    Bilirubin Direct      0.0 - 0.2 mg/dL 0.2   IGG      695 - 1620 mg/dL 1160   IgG1      300 - 856 mg/dL 416   IgG2      158 - 761 mg/dL 384   IgG3      24 - 192 mg/dL 83   IgG4      11 - 86 mg/dL 95 (H)       Anita Becerra MD

## 2018-09-14 NOTE — PATIENT INSTRUCTIONS
Labs every 3 months, next due in December 2018    Recommend shingrix vaccine    Hydris [facility]gate for dry mouth    Return in a year

## 2018-09-14 NOTE — PROGRESS NOTES
Rheumatology Clinic Visit-Fellow Note     Dinora Mcghee MRN# [id number]   YOB: 1965 Age: 53 year old     Date of Visit: 09/14/2018   Last seen: 3/23/2018  Primary care provider: Justyna Lawson  Referring Provider: Dr. Ara Lugo with GI  Reason for Referral: Further evaluation and management for possible Sjogren's vs IgG4 related disease in patient with sicca symptoms and evidence of isolated? IgG4 in liver/pancreas.           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   # h/o endometriosis  # h/o pituitary mass with 2/2 DI now s/p transsphenoidal resection in 1990  # hypothyroidism on synthroid.  # Fatigue in ill state    Discussion:  52 y/o  female with longstanding history of chronic pancreatitis s/p pancreatectomy with islet autotransplant 12/2016 who is overall improving but found to have elevated serum and > 60 HPF cells on liver biopsy staining. She has sicca symptoms for the past 5 years but no evidence of Sjogren's vs IgG4 related disease on lip biopsy. No other evidence of IgG4 related activity with no RPF, aortitis, orbital disease, thyroid, pulmonary, or kidney disease. Interestingly, IgG4 disease can impact the hypophysis and she does have a history of pituitary issues back in 1990. Recent meta analysis assessing IgG4 serum sensitivity is 87.2% with 82% specificity (1). Her biopsy containing > 50 IgG4 staining plasma cells is highly suggestive of IgG4 related disease as well and meets recent proposed pathologic recommendations of IgG4 related disease (2). She meets many of the features for IgG4 related disease including tissue biopsy.     In regards to management of her IgG4 Related disease there are no randomized control trials and all  medicines are investigational. The most recent evidence highlights that the IgG4 antibodies are pathologic and that medications that suppress B lymphocytes like CD-20 cells (Rituximab) creating the antibodies are the preferred therapy. This is especially important if the organ dysfunction can be a critical organ like the liver in a patient with recent surgeries and transplant. Currently her liver dysfunction has normalized and there is no evidence of ongoing IgG4 related disease at this time.     Overall, we agree with the high levels of stress and degree of fatigue with worsening HA during active work we recommend she go very slow with her part time work. She has been severely ill and it will take time to regain and retrain her stamina.        Plan:  -- labs q3mo, due in Dec 2018, LFTs much improved from July to September 2018  -- agree with plan to continue to monitor off immune suppressives given ongoing stability in liver labs and IgG4 levels.   -- If liver dysfunction returns would need to decide with GI if we suspect related to IgG4 and if so then therapeutic options for IgG4 related disease would normally be Rituximab preferred (but has steroids (can cause irreversible damage to islet cells) with infusion) vs MMF (no steroids but not ideal agent).  -- Patient deferred initiating Evoxac or restasis at this point in time.  -- discussed various lozenges that help sicca symptoms, was advised to try hydris colgate toothpaste  -- f/u with GI for N/V  -- she will need slow reintroduction into work as she was severely ill.  --Shingrix vaccine. Recommended shingrix. CDC recommends this vaccine 2 doses,  by 2-6 months for adults 50 years and older. It is killed virus and ok to be used in immunocompromised patients. Shingrix reduces the risk of shingles and PHN by more than 90% in people 50 and older.     AE include: pain, redness and swelling at the inj site, myalgia, fatigue, headaches, shivering, fever and  stomach upset.    Follow up:  RTC in 12 months, unless new symptoms emerge.    Prevention:  Pneumovax PPSV23: 12/2016  PCV13 (Prevnar): not in [facility]  Tdap: 1/7/2008?  Influenza received 10/20/17  Shingles vaccine: not recieved  Reminded if on biologic no live vaccine (flu mist, shingles): n/a  HBVsAg: negative  HBVcore: ordered  HCV: negative   Q gold (or T spot): ordered  Bactrim prophylaxis: n/a  Fungal prophylaxis: n/a  Vitamin D and calcium/bone health: Ca and Vitamin D    Immunizations:  Immunization History   Administered Date(s) Administered     Hib (PRP-T) 12/01/2016     Influenza (IIV3) PF 09/29/2011, 11/15/2014, 10/21/2016     Influenza (intradermal) 11/01/2003, 09/29/2011     Influenza Vaccine IM 3yrs+ 4 Valent IIV4 10/24/2015, 10/21/2016, 10/20/2017     Influenza Vaccine, 3 YRS +, IM (QUADRIVALENT W/PRESERVATIVES) 11/04/2013     Meningococcal (Bexsero ) 12/09/2016     Meningococcal (Menactra ) 12/01/2016     Pneumo Conj 13-V (2010&after) 09/06/2017     Pneumococcal 23 valent 12/01/2016     TD (ADULT, 7+) 07/09/1998     TDAP Vaccine (Adacel) 01/07/2008     TDAP Vaccine (Boostrix) 07/31/2018       Orders Placed This Encounter   Procedures     Comprehensive metabolic panel     CBC with platelets differential     Immunoglobulin G subclasses               Active Problem List:     Patient Active Problem List    Diagnosis Date Noted     S/P hernia repair 08/23/2018     Priority: Medium     Adhesive capsulitis of shoulder, unspecified laterality 08/02/2018     Priority: Medium     Incisional hernia, without obstruction or gangrene 05/20/2018     Priority: Medium     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  Document previously scanned on 1/12/17.   Validation form completed and scanned.  Code Status reflects choices in most recent ACP document..  Confirmed/documented designated decision maker(s).  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 Provider to review and confirm.       GERD (gastroesophageal reflux disease) 12/01/2010     Priority: Medium     Lumbago 04/18/2005     Priority: Medium     History of L5-S1 degenerative disk disease.         Sprain and strain of other specified sites of hip and thigh 12/09/2002     Priority: Medium     Chondromalacia of patella 12/09/2002     Priority: Medium     Need for prophylactic immunotherapy      Priority: Medium     trees, grass, srw, dust mites, cat       Allergic rhinitis due to other allergen 12/21/2001     Priority: Medium     Hypothyroidism      Priority: Medium     Problem list name updated by automated process. Provider to review       Sensorineural hearing loss 01/10/2005     Priority: Medium     Problem list name updated by automated process. Provider to review       Vertiginous syndrome and labyrinthine disorder 01/10/2005     Priority: Medium     Problem list name updated by automated process. Provider to review              History of Present Illness:   Dinora Mcghee is a 52 year old female with a past medical history of hypothyroidism, h/o pituitary mass and secondary DI s/p transphenoidal resection 1990, HLD, left knee OA, lumbar DDD, chronic pancreatitis for 30 years, now s/p islet cell transplantation who presents today for further evaluation of elevated IgG4 found both in serum and liver  "biopsy as well as sicca symptoms.     She notes that she has had longstanding issues with pancreatitis without any mention of aortitis, RPF, urinary obstruction. She denies any history of cancers, lymphoma or leukemia. No family history of autoimmune conditions other than possible RA in her dad. She had distant DI secondary from pituitary mass (s/p removal and not needing hormones) and joint wise has longstanding OA of left knee. She mentions that for the past 5 years she has had dry eye requiring eye drops. Sometimes related to her allergies she has to now regularly use eye drops. \"Feels like sand\" is rubbing in eyes. She also noticed ~ 5 years ago dry mouth where a cracker would not dissolve. She has to constantly carry water and drink it to keep her mouth dry. She uses sugar free gum and lozenges. Most of her clinical symptom burden has been related to her pancreas with frequent ERCP and concern for Sphincter of Oddi dysfunction. She had issues with significant pancreatic insuffiencey with malnutrition and weight loss. She underwent islet cell autotransplantation 12/28/2017 and was found to have elevated IgG4 cells in her liver biopsy. Subsequent IgG4 levels were elevated.    Interim history: 6/20/2017 - 8/4/2017  Comes in today frustrated about the multiple denials from the insurance about Rituximab for her IgG4 Related disease. She has had recent elevations of her LFTs and mentions that the last 3 days she has started to feel worse like before her pancreatectomy. Some nausea, no vomiting, but her appetite is lower. She denies any jaundice, but does not itching of her skin. She denies any fevers, chills, but has had 1 day of watery, nonbloody diarrhea. She had a recent MRCP with signs of biliary dilatation at ducts within the liver. She has a new hyperenhancement lesion in her liver with stability of other lesions. She is following with Dr. Lugo within the next week. She mentions that her dry eyes are " getting worse.     Interim history: 8/4/2017 - 10/20/2017  After further discussion with GI and SOT regarding therapies Dinora's LFTs normalized and the consensus was to monitor and hold on immune suppressive therapies given the side effect profile. She has continued to improve and is now off insulin. She wonders about her known slow emptying an gastroparesis has been acting up and she has had issues with N/V the past 2 weeks particularly after she lays down (worse in AM, but best midday). Otherwise things have been going well, denies any infections and her shoulder is almost back to normal.     Interim History: 10/20/2017 - 3/23/2018  Returns today feeling better. She has adjusted her diet with great improvement. Has noticed some fatigue the last several weeks, but denies any infectious symptoms. Has intermittent sharp pains in her left lower rib chest wall. This has been responding to tylenol. Denies any fevers or chills. She has noticed fatigue and tries to be very active with walking 3 miles. She mentions that she has to take naps now she never had before. She has been back part time as well that has worsened her control over her HA as well as her stamina levels.     Today: Sicca sx are managable on biotene and systane.    Has RUQ pain and nausea x 7days. Has migraines today.          Review of Systems:   Constitutional: denies fevers/chills, positive fatigue, patient is having weight gain s/p transplant.  Skin: denies rash, raynauds or alopecia  Eyes: denies hx of uvetitis, double vision, positive dry eyes  Ears/Nose/Throat:  denies recurrent URI or sinusitis, positive dry mouth, denies any oral or nasal ulcers  Respiratory: No shortness of breath, dyspnea on exertion, cough, or hemoptysis  Cardiovascular:  denies chest pain, palpitations, hx pleurisy  Gastrointestinal: denies diarrhea, blood in stool, hx of IBD, positive past pancreatitis, positive nausea, rare vomiting, positive RUQ pain.  Genitourinary:  denies hematuria.  Musculoskeletal: denies red, tender, swollen joints, left shoulder frozen shoulder since surgery almost normal.  Neurologic:  denies headaches, seizures, some numbness or tingling s/p abdominal surgery.  Psychiatric:  denies history of depression or mental illness  Hematologic/Lymphatic/Immunologic:  denies history of blood clots, easy bruising, bleeding.  Endocrine:  Positive hypothyroidism.          Past Medical History:     Past Medical History:   Diagnosis Date     Allergic rhinitis, cause unspecified      Allergy to other foods     strawberries, apples, celeries, alice, watermelon     Arthritis     left knee     Choledocholithiasis     long after cholecystectomy     Chronic abdominal pain      Chronic constipation      Chronic nausea      Chronic pancreatitis (H)      Degeneration of lumbar or lumbosacral intervertebral disc      Esophageal reflux     w/ hiatal hernia     Gastroparesis      Hiatal hernia      History of pituitary adenoma     s/p resection     Hypothyroidism      Migraines      Mild hyperlipidemia      On tube feeding diet     presence of GJ tube     Pancreatic disease     PD stricture, 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    Past Surgical History  Past Surgical History:   Procedure Laterality Date     ABDOMEN SURGERY      c sections: 93, 96, 98. endometriosis growth     APPENDECTOMY       C  DELIVERY ONLY       C  DELIVERY ONLY      repeat c section with incidental cystotomy with repair     C EXCIS PITUITARY,TRANSNASAL/SEPTAL      pituitary tumor removed for diabetes insipidus     C TOTAL ABDOM HYSTERECTOMY      w/ bilateral salpingoophorectomy      C WRIST ARTHROSCOP,RELEASE XVERS LIG Bilateral 08      SECTION       COLONOSCOPY       ENDOSCOPIC  RETROGRADE CHOLANGIOPANCREATOGRAM N/A 6/2/2015    Procedure: ENDOSCOPIC RETROGRADE CHOLANGIOPANCREATOGRAM;  Surgeon: Mandeep Park MD;  Location: [facility]     ENDOSCOPIC RETROGRADE CHOLANGIOPANCREATOGRAM N/A 2/9/2016    Procedure: COMBINED ENDOSCOPIC RETROGRADE CHOLANGIOPANCREATOGRAPHY, PLACE TUBE/STENT;  Surgeon: Mandeep Park MD;  Location: [facility]     ENDOSCOPIC RETROGRADE CHOLANGIOPANCREATOGRAM N/A 3/17/2016    Procedure: COMBINED ENDOSCOPIC RETROGRADE CHOLANGIOPANCREATOGRAPHY, REMOVE FOREIGN BODY OR STENT/TUBE;  Surgeon: Mandeep Park MD;  Location: [facility]     ENDOSCOPIC RETROGRADE CHOLANGIOPANCREATOGRAM N/A 8/2/2016    Procedure: COMBINED ENDOSCOPIC RETROGRADE CHOLANGIOPANCREATOGRAPHY, PLACE TUBE/STENT;  Surgeon: Mandeep Park MD;  Location: [facility]     ENDOSCOPIC RETROGRADE CHOLANGIOPANCREATOGRAM N/A 8/26/2016    Procedure: COMBINED ENDOSCOPIC RETROGRADE CHOLANGIOPANCREATOGRAPHY, REMOVE FOREIGN BODY OR STENT/TUBE;  Surgeon: Mandeep Park MD;  Location: [facility]     ENDOSCOPIC ULTRASOUND UPPER GASTROINTESTINAL TRACT (GI) N/A 10/3/2016    Procedure: ENDOSCOPIC ULTRASOUND, ESOPHAGOSCOPY / UPPER GASTROINTESTINAL TRACT (GI);  Surgeon: Guru Jose Rodas MD;  Location: [facility]     ESOPHAGOSCOPY, GASTROSCOPY, DUODENOSCOPY (EGD), COMBINED N/A 6/24/2015    Procedure: COMBINED ESOPHAGOSCOPY, GASTROSCOPY, DUODENOSCOPY (EGD), REMOVE FOREIGN BODY;  Surgeon: Mandeep Park MD;  Location: [facility]     ESOPHAGOSCOPY, GASTROSCOPY, DUODENOSCOPY (EGD), COMBINED N/A 10/25/2015    Procedure: COMBINED ESOPHAGOSCOPY, GASTROSCOPY, DUODENOSCOPY (EGD);  Surgeon: Sammy Amaro MD;  [facility]: [facility]     ESOPHAGOSCOPY, GASTROSCOPY, DUODENOSCOPY (EGD), COMBINED N/A 10/25/2015    Procedure: COMBINED ESOPHAGOSCOPY, GASTROSCOPY, DUODENOSCOPY (EGD), BIOPSY SINGLE OR MULTIPLE;  Surgeon: Sammy Amaro MD;  Location: [facility]     ESOPHAGOSCOPY, GASTROSCOPY, DUODENOSCOPY (EGD),  DILATATION, COMBINED       EXCISE LESION TRUNK N/A 4/17/2017    Procedure: EXCISE LESION TRUNK;  Removal of Abdominal Foreign Body;  Surgeon: Nestor Phoenix MD;  [facility]     HC ESOPH/GAS REFLUX TEST W NASAL IMPED >1 HR N/A 11/19/2015    Procedure: ESOPHAGEAL IMPEDENCE FUNCTION TEST WITH 24 HOUR PH GREATER THAN 1 HOUR;  Surgeon: Thiago Apple MD;  Location: [facility]     HC [facility] ENDOSCOPY DIAG W BIOPSY  9/17/08     HC [facility] ENDOSCOPY DIAG W BIOPSY  9/27/12     HC UGI ENDOSCOPY W ESOPHAGEAL DILATION BALLOON <30MM  9/17/08     HC UGI ENDOSCOPY W EUS N/A 5/5/2015    Procedure: COMBINED ENDOSCOPIC ULTRASOUND, ESOPHAGOSCOPY, GASTROSCOPY, DUODENOSCOPY (EGD);  Surgeon: Wm Dueñas MD;  Location: [facility]     INJECT TRANSVERSUS ABDOMINIS PLANE (TAP) BLOCK BILATERAL Left 9/22/2016    Procedure: INJECT TRANSVERSUS ABDOMINIS PLANE (TAP) BLOCK BILATERAL;  Surgeon: Dickson Corrigan MD;  [facility]     laparoscopic pineda  1995     LAPAROSCOPIC HERNIORRHAPHY INCISIONAL N/A 8/23/2018    Procedure: LAPAROSCOPIC HERNIORRHAPHY INCISIONAL;  Laparoscopic Incisional Hernia Repair with Symbotex Mesh Implant;  Surgeon: Nestor Phoenix MD;  [facility]: [facility]     LAPAROSCOPIC PANCREATECTOMY, TRANSPLANT AUTO ISLET CELL N/A 12/28/2016    Procedure: LAPAROSCOPIC PANCREATECTOMY, TRANSPLANT AUTO ISLET CELL;  Surgeon: Nestor Phoenix MD;  Location: [facility]     transphenoidal pituitary resection  1990            Social History:     Social History     Occupational History     director [employer]     Social History Main Topics     Smoking status: Former Smoker     Packs/day: 0.50     Years: 6.00     Types: Cigarettes     Start date: 9/1/1985     Quit date: 1/1/1992     Smokeless tobacco: Never Used      Comment: no 2nd hand     Alcohol use 1.8 - 3.6 oz/week     0 Standard drinks or equivalent, 1 - 2 Glasses of wine, 1 - 2 Cans of beer, 1 - 2 Shots of liquor per week      Comment: occasional     Drug use: No     Sexual activity: Yes      Partners: Male     Birth control/ protection: None      Comment: Hystectomy 1999   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 6 years of smoking and quit in 1992. Denies any ETOH.  and lives in the [facility].          Family History:     Family History   Problem Relation Age of Onset     Eye Disorder Father      cataract, detached retina     Myocardial Infarction Father 60     Lipids Father      Cerebrovascular Disease Father      Depression Father      Substance Abuse Father      Anesthesia Reaction Father      stroke right after surgery     Lipids Mother      Hypertension Mother      Thyroid Disease Mother      Eye Disorder Son      ptosis     Eye Disorder Paternal Grandmother      cataract     Eye Disorder Paternal Grandfather      cataract     Diabetes Paternal Grandfather      Eye Disorder Maternal Grandmother      cataract     Thyroid Disease Maternal Grandmother      Coronary Artery Disease Sister      Depression Sister      Depression Son      Anxiety Disorder Son      Thyroid Disease Sister      Diabetes Maternal Grandfather      Depression Nephew      Anxiety Disorder Nephew      Thyroid Disease Nephew      Type 2 Diabetes Cousin      paternal cousin     Father with likely RA following with multiple rheumatologists. 3 children healthy. 1 sister with thyroid issues. Denies any Sjogren's SLE, Scleroderma.            Allergies:     Allergies   Allergen Reactions     Apple Anaphylaxis     Depakote [Divalproex Sodium] Other (See Comments)     Chest pain     Zithromax [Azithromycin Dihydrate] Anaphylaxis     Noted in 4/7/08 ER     Darvocet [Propoxyphene N-Apap] Itching     Morphine Nausea and Vomiting and Rash     Nalbuphine Hcl Rash     RASH :nubaine     Zosyn [Piperacillin-Tazobactam In D5w] Rash     Possible allergy, did have a diffuse rash that seemed drug related but could have also been related to soap in the hospital.      Bactrim [Sulfamethoxazole W-Trimethoprim] Other  (See Comments) and Nausea and Vomiting     Severely low liver function.     Cats      Compazine [Prochlorperazine] Other (See Comments)     Twitching. Takes Benedryl and is fine     Corticosteroids Other (See Comments)     All oral,IV and injectable steroids are contraindicated (unless in life threatening situations) in Islet Auto transplant recipients. They can cause irreversible loss of islet cell function. Please contact patients transplant care coordinator Mecca Watkins RN BSN @ [phone]/Pager [phone], and Endocrinologist prior to administration.     Dust Mites      Grass      Prednisone Other (See Comments)     Insomnia       Ragweeds      Tape [Adhesive Tape] Blisters     Tramadol      Trees      Zofran [Ondansetron] Other (See Comments)     migraine     Flagyl [Metronidazole] Hives and Rash            Medications:     Current Outpatient Prescriptions   Medication Sig Dispense Refill     acetaminophen 500 MG CAPS Take 2 mg by mouth 2 times daily       amitriptyline (ELAVIL) 10 MG tablet   0     amitriptyline (ELAVIL) 50 MG tablet Take 1 tablet (50 mg) by mouth At Bedtime 60 tablet 3     amylase-lipase-protease (CREON 24) 95261-03201 UNITS CPEP per EC capsule Take 3-4 capsules by mouth with meals and 1-2 with snacks. Maximum 15 capsules per day. 450 capsule 6     aspirin 81 MG EC tablet Take 1 tablet (81 mg) by mouth daily 90 tablet 3     azelastine (ASTELIN) 0.1 % spray Spray 1 spray into both nostrils 2 times daily       blood glucose monitoring (PAT CONTOUR NEXT) test strip Use to test blood sugar 6 times daily or as directed. 2 Box 11     blood glucose monitoring (PAT MICROLET) lancets Use to test blood sugar 6-8 times daily or as directed. 1 Box prn     cetirizine (ZYRTEC) 10 MG tablet Take 10 mg by mouth daily       diphenhydrAMINE (BENADRYL ALLERGY) 25 MG capsule Take 1 capsule (25 mg) by mouth every 6 hours as needed for itching or allergies 56 capsule 0     docusate sodium (COLACE) 100  "MG capsule Take 1 capsule (100 mg) by mouth 2 times daily as needed for constipation, 60 capsule 0     estradiol (VAGIFEM) 10 MCG TABS vaginal tablet        FOLIC ACID PO Take 1 mg by mouth daily       glucose 40 % GEL gel Take 15-30 g by mouth every 15 minutes as needed for low blood sugar 3 Tube 2     ibuprofen (ADVIL/MOTRIN) 400 MG tablet Take 1 tablet (400 mg) by mouth every 6 hours as needed for moderate pain 30 tablet 0     levothyroxine (SYNTHROID) 137 MCG tablet Take 137 mcg by mouth daily       Lubiprostone (AMITIZA) 8 MCG CAPS capsule Take 5 capsules (40 mcg) by mouth daily Take 3 capsules in AM and 2 capsules in  capsule 3     multivitamin CF formula (CHOICEFUL) capsule Take 1 tablet by mouth daily  11     omeprazole (PRILOSEC) 40 MG capsule Take 1 capsule (40 mg) by mouth daily 90 capsule 3     order for DME Equipment being ordered:Cefaly 1 Device 0     prochlorperazine (COMPAZINE) 5 MG tablet Take two 5 mg tablets by mouth every six hours as needed for nausea. 90 tablet 3     ranitidine (ZANTAC) 150 MG tablet Take 1 tablet (150 mg) by mouth At Bedtime 90 tablet 3     senna-docusate (SENOKOT-S;PERICOLACE) 8.6-50 MG per tablet Take 1-2 tablets by mouth 2 times daily 100 tablet 0     Sharps Container (BD SHARPS ) MISC 1 Container as needed 1 each 1     ZOLMitriptan (ZOMIG) 5 MG tablet Take 5 mg by mouth at onset of headache for migraine       HYDROmorphone (DILAUDID) 2 MG tablet Take 0.5-1 tablets (1-2 mg) by mouth every 3 hours as needed for moderate to severe pain (Patient not taking: Reported on 9/5/2018) 15 tablet 0     LORazepam (ATIVAN) 1 MG tablet Take 1 tablet (1 mg) by mouth every 6 hours as needed for anxiety (Patient not taking: Reported on 9/5/2018) 10 tablet 0            Physical Exam:   Blood pressure 109/73, pulse 80, temperature 98.1  F (36.7  C), temperature source Oral, height 1.727 m (5' 8\"), weight 67.5 kg (148 lb 12.8 oz), SpO2 99 %, not currently breastfeeding.  Wt " Readings from Last 4 Encounters:   09/14/18 67.5 kg (148 lb 12.8 oz)   09/05/18 68.7 kg (151 lb 6.4 oz)   08/24/18 70.4 kg (155 lb 3.2 oz)   07/31/18 68.3 kg (150 lb 9.6 oz)     Ideal body weight: 63.9 kg (140 lb 14 oz)  Adjusted ideal body weight: 65.3 kg (144 lb 0.7 oz)    Constitutional: well-developed, appearing stated age; cooperative  Eyes: nl EOM, conjunctiva, sclera. No icterus.   ENT: nl external ears, nose, lips, teeth, gums, throat  No mucous membrane lesions, dry saliva pool, no parotid enlargement  Neck: no mass or thyroid enlargement  Resp: lungs clear to auscultation,  CV: RRR, no murmurs, rubs or gallops, no edema  GI: No Right sided or Lower quadrant tenderness.  Lymph: no cervical, supraclavicular nodes  MS: no active synovitis or joint tenderness  Skin: no nail pitting, alopecia, rash, nodules or lesions  Neuro: nl cranial nerves, strength in both proximal and distal UE and LE.   Psych: nl affect.         Data:     Results for orders placed or performed in visit on 09/13/18   XR Abdomen 2 Views    Narrative    Exam: XR ABDOMEN 2 VW, 9/13/2018 3:23 PM    Indication: ; Abdominal pain    Comparison: Abdominal MRI 7/13/2017    Findings:   Supine and upright views of the abdomen. Numerous surgical clips in  the upper abdomen. Surgical sutures noted in the right hemiabdomen  adjacent to the spine as well as the left upper quadrant. Moderate  colonic stool burden. Bowel gas pattern is nonobstructive. No  pneumatosis. No portal venous gas. Pelvic phleboliths. Lung bases are  clear.      Impression    Impression: Moderate colonic stool burden. Nonobstructive bowel gas  pattern.    I have personally reviewed the examination and initial interpretation  and I agree with the findings.    AMARI OTERO MD     *Note: Due to a large number of results and/or encounters for the requested time period, some results have not been displayed. A complete set of results can be found in Results Review.       Recent  Labs   Lab Test  04/25/17   1355  04/04/17   0757  02/21/17   1315   12/29/16   0620   06/06/15   1903   10/23/12   1451   WBC  4.4  5.0  4.7   < >  10.7   < >   --    < >   --    RBC  4.38  4.04  4.28   < >  3.13*   < >   --    < >   --    HGB  11.1*  10.4*  11.6*   < >  9.1*   < >   --    < >   --    HCT  36.1  33.4*  37.1   < >  27.7*   < >   --    < >   --    MCV  82  83  87   < >  89   < >   --    < >   --    RDW  17.5*  15.8*  13.0   < >  13.2   < >   --    < >   --    PLT  543*  482*  733*   < >  107*   < >   --    < >   --    ALBUMIN  3.7  3.2*  3.7   < >  2.9*   < >  2.8*   < >  4.4   CRP   --    --    --    --   90.0*   --   71.0*   --   7.2   BUN  16  17  13   < >  7   < >  2*   < >   --     < > = values in this interval not displayed.      Recent Labs   Lab Test  12/20/16   1121  10/23/15   1554  10/09/13   0721   06/25/09   1423   TSH  0.13*  0.57  1.86   < >  0.37*   T4  1.17   --    --    --   1.17    < > = values in this interval not displayed.     Hemoglobin   Date Value Ref Range Status   09/13/2018 14.1 11.7 - 15.7 g/dL Final   08/24/2018 13.5 11.7 - 15.7 g/dL Final   08/23/2018 13.0 11.7 - 15.7 g/dL Final     Urea Nitrogen   Date Value Ref Range Status   09/13/2018 12 7 - 30 mg/dL Final   07/31/2018 14 7 - 30 mg/dL Final   03/23/2018 12 7 - 30 mg/dL Final     Creatinine   Date Value Ref Range Status   05/20/2017 0.6 0.52 - 1.04 mg/dL Final     Sed Rate   Date Value Ref Range Status   10/23/2012 11 0 - 20 mm/h Final   07/27/2006 9 0 - 20 mm/h Final     CRP Inflammation   Date Value Ref Range Status   12/29/2016 90.0 (H) 0.0 - 8.0 mg/L Final   06/06/2015 71.0 (H) 0.0 - 8.0 mg/L Final   10/23/2012 7.2 0.0 - 8.0 mg/L Final     AST   Date Value Ref Range Status   09/13/2018 39 0 - 45 U/L Final   07/31/2018 69 (H) 0 - 45 U/L Final   03/23/2018 44 0 - 45 U/L Final     Albumin   Date Value Ref Range Status   09/13/2018 3.9 3.4 - 5.0 g/dL Final   07/31/2018 3.7 3.4 - 5.0 g/dL Final   03/23/2018 3.9 3.4 -  5.0 g/dL Final     Alkaline Phosphatase   Date Value Ref Range Status   09/13/2018 145 40 - 150 U/L Final   07/31/2018 170 (H) 40 - 150 U/L Final   03/23/2018 160 (H) 40 - 150 U/L Final     ALT   Date Value Ref Range Status   09/13/2018 59 (H) 0 - 50 U/L Final   07/31/2018 77 (H) 0 - 50 U/L Final   03/23/2018 64 (H) 0 - 50 U/L Final     Rheumatoid Factor   Date Value Ref Range Status   04/10/2017 <20 <20 IU/mL Final     Recent Labs   Lab Test  09/13/18   1449  08/24/18   0828  08/23/18   1238   07/31/18   1552  03/23/18   0958   12/20/16   1121   10/23/15   1554   WBC  5.8   --    --    --   5.6  4.1   < >   --    < >  4.8   HGB  14.1  13.5  13.0   < >  13.7  13.9   < >   --    < >  13.3   HCT  42.1  41.7  39.6   < >  41.7  41.7   < >   --    < >  39.1   MCV  93   --    --    --   94  94   < >   --    < >  89   PLT  430   --    --    --   377  382   < >   --    < >  212   BUN  12   --    --    --   14  12   < >   --    < >  10   TSH   --    --    --    --    --   0.74   --   0.13*   --   0.57   AST  39   --    --    --   69*  44   < >   --    < >  56*   ALT  59*   --    --    --   77*  64*   < >   --    < >  79*   ALKPHOS  145   --    --    --   170*  160*   < >   --    < >  206*    < > = values in this interval not displayed.     AMARI undetected  SSA, SSB, cryoglobulin all negative  Normal C3, C4  RF undetected  IGG subclasses with mild IGG4 elevation to 146H -> 89 -> 70s  SPEP with normal pattern and no monoclonal protein seen.    Recent LFT normal with declining Alk Phos. Lowest in past year  IgG4 normalizing    LFTs, CBC, IgG subclasses normal 1/2018.    Other studies:   Tissue biopsy 12/28/2016:  FINAL DIAGNOSIS:   A. Spleen, splenectomy:   - Splenic tissue with no significant histologic abnormality     B. Duodenum and pancreas, resection:   - Chronic pancreatitis   - Duodenum with no significant histologic abnormality     C. Pancreas, uncinate process, biopsy:   - Chronic pancreatitis     D. Liver, dome lesion,  needle core biopsy:   - Liver with spindle cell proliferation/lesion, acute and chronic   inflammation and fibrosis   - Focally increased IgG4 positive plasma cells (upto 60/HPF)   - See comment     E. Pancreas, head, biopsy:   - Chronic pancreatitis     F. Pancreas, tail, biopsy:   - Chronic pancreatitis     G. Pancreas, body, biopsy:   - Chronic pancreatitis with fat necrosis     COMMENT:   Sections of the liver lesion show stromal proliferation composed of   bland spindle cell bundle and whorls with associated fibrosis   infiltrated by neutrophils, lymphocytes, plasma cells and macrophages.   Occasional lymphoid aggregates are noted. The surrounding liver   parenchymal tissue show diffuse moderate portal inflammation composed of   lymphocytes and plasma cells.  There is also a mild to moderate   infiltrate of neutrophils.  Reticulin stain shows preserved reticulin   framework.  Trichrome stains highlight the spindle cell proliferation   and show mild portal fibrosis.  Immunostain are performed with   appropriate controls. The spindle cell proliferation is negative for ALK   and show patchy infiltrate of IgG4 positive plasma cells (upto 60/HPF).   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 Possibility of IgG4-related disease cannot be ruled out.   Clinical and radiologic correlation is recommended.     MRI Abdomen:4/22/2017  IMPRESSION:  1. 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  2. No significant change in small wedge-shaped peripheral areas of  persistent enhancement surrounding mildly dilated biliary radicles,  findings most compatible with cholangitis or sequela of previous  infection.  3. Nonspecific subcutaneous fluid collection in the left upper  quadrant amidst the abdominal wall postsurgical changes.    Reviewed Rheumatology lab flowsheet    MRCP 7/13/2017  IMPRESSION:  1. Findings consistent with hemosiderin deposition  within the liver.  2. There is improvement in the heterogeneous enhancement seen on  comparison exam, though patchy areas of increased T2 signal and  persistent enhancement are seen in the liver suggestive of regions of  fibrosis. Mildly dilated bile ducts in the regions of the liver which  appears somewhat fibrotic, overall similar to prior exam.  3. Postsurgical changes of pancreatectomy, splenectomy and  cholecystectomy.    Lip Biopsy 5/31/2017:  FINAL DIAGNOSIS:   Lip biopsy, lower:   -  Benign minor salivary glands with normal lobular architecture, see   comment     COMMENT:   There is focal mild chronic inflammation.  There is no morphologic   evidence of IgG-4 related disease or Sjogren's disease.     Component      Latest Ref Rng & Units 7/31/2018 8/23/2018 8/23/2018 8/23/2018            5:30 AM 10:32 AM 10:35 AM   WBC      4.0 - 11.0 10e9/L 5.6      RBC Count      3.8 - 5.2 10e12/L 4.43      Hemoglobin      11.7 - 15.7 g/dL 13.7  13.0    Hematocrit      35.0 - 47.0 % 41.7  39.7    MCV      78 - 100 fl 94      MCH      26.5 - 33.0 pg 30.9      MCHC      31.5 - 36.5 g/dL 32.9      RDW      10.0 - 15.0 % 13.9      Platelet Count      150 - 450 10e9/L 377      Diff Method             % Neutrophils      %       % Lymphocytes      %       % Monocytes      %       % Eosinophils      %       % Basophils      %       % Immature Granulocytes      %       Nucleated RBCs      0 /100       Absolute Neutrophil      1.6 - 8.3 10e9/L       Absolute Lymphocytes      0.8 - 5.3 10e9/L       Absolute Monocytes      0.0 - 1.3 10e9/L       Absolute Eosinophils      0.0 - 0.7 10e9/L       Absolute Basophils      0.0 - 0.2 10e9/L       Abs Immature Granulocytes      0 - 0.4 10e9/L       Absolute Nucleated RBC             Sodium      133 - 144 mmol/L 138      Potassium      3.4 - 5.3 mmol/L 3.9      Chloride      94 - 109 mmol/L 105      Carbon Dioxide      20 - 32 mmol/L 28      Anion Gap      3 - 14 mmol/L 5      Glucose      70  - 99 mg/dL 91 87  94   Urea Nitrogen      7 - 30 mg/dL 14      Creatinine      0.52 - 1.04 mg/dL 0.68      GFR Estimate      >60 mL/min/1.7m2 >90      GFR Estimate If Black      >60 mL/min/1.7m2 >90      Calcium      8.5 - 10.1 mg/dL 9.0      Bilirubin Total      0.2 - 1.3 mg/dL 0.7      Albumin      3.4 - 5.0 g/dL 3.7      Protein Total      6.8 - 8.8 g/dL 7.6      Alkaline Phosphatase      40 - 150 U/L 170 (H)      ALT      0 - 50 U/L 77 (H)      AST      0 - 45 U/L 69 (H)      Color Urine       Straw      Appearance Urine       Clear      Glucose Urine      NEG:Negative mg/dL Negative      Bilirubin Urine      NEG:Negative Negative      Ketones Urine      NEG:Negative mg/dL Negative      Specific Gravity Urine      1.003 - 1.035 1.003      Blood Urine      NEG:Negative Negative      pH Urine      5.0 - 7.0 pH 7.0      Protein Albumin Urine      NEG:Negative mg/dL Negative      Urobilinogen mg/dL      0.0 - 2.0 mg/dL 0.0      Nitrite Urine      NEG:Negative Negative      Leukocyte Esterase Urine      NEG:Negative Negative      Source       Midstream Urine      WBC Urine      0 - 5 /HPF <1      RBC Urine      0 - 2 /HPF <1      Bacteria Urine      NEG:Negative /HPF Few (A)      Mucous Urine      NEG:Negative /LPF Present (A)      Bilirubin Direct      0.0 - 0.2 mg/dL       IGG      695 - 1620 mg/dL       IgG1      300 - 856 mg/dL       IgG2      158 - 761 mg/dL       IgG3      24 - 192 mg/dL       IgG4      11 - 86 mg/dL         Component      Latest Ref Rng & Units 8/23/2018 8/23/2018 8/24/2018          12:38 PM  2:46 PM    WBC      4.0 - 11.0 10e9/L      RBC Count      3.8 - 5.2 10e12/L      Hemoglobin      11.7 - 15.7 g/dL 13.0  13.5   Hematocrit      35.0 - 47.0 % 39.6  41.7   MCV      78 - 100 fl      MCH      26.5 - 33.0 pg      MCHC      31.5 - 36.5 g/dL      RDW      10.0 - 15.0 %      Platelet Count      150 - 450 10e9/L      Diff Method            % Neutrophils      %      % Lymphocytes      %      %  Monocytes      %      % Eosinophils      %      % Basophils      %      % Immature Granulocytes      %      Nucleated RBCs      0 /100      Absolute Neutrophil      1.6 - 8.3 10e9/L      Absolute Lymphocytes      0.8 - 5.3 10e9/L      Absolute Monocytes      0.0 - 1.3 10e9/L      Absolute Eosinophils      0.0 - 0.7 10e9/L      Absolute Basophils      0.0 - 0.2 10e9/L      Abs Immature Granulocytes      0 - 0.4 10e9/L      Absolute Nucleated RBC            Sodium      133 - 144 mmol/L      Potassium      3.4 - 5.3 mmol/L      Chloride      94 - 109 mmol/L      Carbon Dioxide      20 - 32 mmol/L      Anion Gap      3 - 14 mmol/L      Glucose      70 - 99 mg/dL  104 (H) 85   Urea Nitrogen      7 - 30 mg/dL      Creatinine      0.52 - 1.04 mg/dL      GFR Estimate      >60 mL/min/1.7m2      GFR Estimate If Black      >60 mL/min/1.7m2      Calcium      8.5 - 10.1 mg/dL      Bilirubin Total      0.2 - 1.3 mg/dL      Albumin      3.4 - 5.0 g/dL      Protein Total      6.8 - 8.8 g/dL      Alkaline Phosphatase      40 - 150 U/L      ALT      0 - 50 U/L      AST      0 - 45 U/L      Color Urine            Appearance Urine            Glucose Urine      NEG:Negative mg/dL      Bilirubin Urine      NEG:Negative      Ketones Urine      NEG:Negative mg/dL      Specific Gravity Urine      1.003 - 1.035      Blood Urine      NEG:Negative      pH Urine      5.0 - 7.0 pH      Protein Albumin Urine      NEG:Negative mg/dL      Urobilinogen mg/dL      0.0 - 2.0 mg/dL      Nitrite Urine      NEG:Negative      Leukocyte Esterase Urine      NEG:Negative      Source            WBC Urine      0 - 5 /HPF      RBC Urine      0 - 2 /HPF      Bacteria Urine      NEG:Negative /HPF      Mucous Urine      NEG:Negative /LPF      Bilirubin Direct      0.0 - 0.2 mg/dL      IGG      695 - 1620 mg/dL      IgG1      300 - 856 mg/dL      IgG2      158 - 761 mg/dL      IgG3      24 - 192 mg/dL      IgG4      11 - 86 mg/dL        Component      Latest Ref  Rng & Units 9/13/2018             WBC      4.0 - 11.0 10e9/L 5.8   RBC Count      3.8 - 5.2 10e12/L 4.51   Hemoglobin      11.7 - 15.7 g/dL 14.1   Hematocrit      35.0 - 47.0 % 42.1   MCV      78 - 100 fl 93   MCH      26.5 - 33.0 pg 31.3   MCHC      31.5 - 36.5 g/dL 33.5   RDW      10.0 - 15.0 % 13.2   Platelet Count      150 - 450 10e9/L 430   Diff Method       Automated Method   % Neutrophils      % 37.1   % Lymphocytes      % 40.5   % Monocytes      % 10.5   % Eosinophils      % 9.8   % Basophils      % 2.1   % Immature Granulocytes      % 0.0   Nucleated RBCs      0 /100 0   Absolute Neutrophil      1.6 - 8.3 10e9/L 2.2   Absolute Lymphocytes      0.8 - 5.3 10e9/L 2.4   Absolute Monocytes      0.0 - 1.3 10e9/L 0.6   Absolute Eosinophils      0.0 - 0.7 10e9/L 0.6   Absolute Basophils      0.0 - 0.2 10e9/L 0.1   Abs Immature Granulocytes      0 - 0.4 10e9/L 0.0   Absolute Nucleated RBC       0.0   Sodium      133 - 144 mmol/L 138   Potassium      3.4 - 5.3 mmol/L 4.3   Chloride      94 - 109 mmol/L 102   Carbon Dioxide      20 - 32 mmol/L 30   Anion Gap      3 - 14 mmol/L 5   Glucose      70 - 99 mg/dL 119 (H)   Urea Nitrogen      7 - 30 mg/dL 12   Creatinine      0.52 - 1.04 mg/dL 0.83   GFR Estimate      >60 mL/min/1.7m2 72   GFR Estimate If Black      >60 mL/min/1.7m2 87   Calcium      8.5 - 10.1 mg/dL 9.2   Bilirubin Total      0.2 - 1.3 mg/dL 0.8   Albumin      3.4 - 5.0 g/dL 3.9   Protein Total      6.8 - 8.8 g/dL 7.8   Alkaline Phosphatase      40 - 150 U/L 145   ALT      0 - 50 U/L 59 (H)   AST      0 - 45 U/L 39   Color Urine          Appearance Urine          Glucose Urine      NEG:Negative mg/dL    Bilirubin Urine      NEG:Negative    Ketones Urine      NEG:Negative mg/dL    Specific Gravity Urine      1.003 - 1.035    Blood Urine      NEG:Negative    pH Urine      5.0 - 7.0 pH    Protein Albumin Urine      NEG:Negative mg/dL    Urobilinogen mg/dL      0.0 - 2.0 mg/dL    Nitrite Urine      NEG:Negative     Leukocyte Esterase Urine      NEG:Negative    Source          WBC Urine      0 - 5 /HPF    RBC Urine      0 - 2 /HPF    Bacteria Urine      NEG:Negative /HPF    Mucous Urine      NEG:Negative /LPF    Bilirubin Direct      0.0 - 0.2 mg/dL 0.2   IGG      695 - 1620 mg/dL 1160   IgG1      300 - 856 mg/dL 416   IgG2      158 - 761 mg/dL 384   IgG3      24 - 192 mg/dL 83   IgG4      11 - 86 mg/dL 95 (H)

## 2018-09-15 NOTE — NURSING NOTE
"Chief Complaint   Patient presents with     Consult     Follow up for dry eyes and mouth/elevated IGg4       Initial /62  Pulse 92  Temp 97.8  F (36.6  C) (Oral)  Ht 1.727 m (5' 8\")  Wt 69.4 kg (153 lb)  SpO2 98%  BMI 23.26 kg/m2 Estimated body mass index is 23.26 kg/(m^2) as calculated from the following:    Height as of this encounter: 1.727 m (5' 8\").    Weight as of this encounter: 69.4 kg (153 lb).  Medication Reconciliation: complete   Meli Bruner CMA    " Private car

## 2018-09-18 PROBLEM — M75.00 ADHESIVE CAPSULITIS OF SHOULDER, UNSPECIFIED LATERALITY: Status: ACTIVE | Noted: 2017-11-14

## 2018-09-18 PROBLEM — G89.4 CHRONIC PAIN SYNDROME: Status: ACTIVE | Noted: 2018-09-18

## 2018-09-18 PROBLEM — G43.709 HEADACHE, CHRONIC MIGRAINE WITHOUT AURA: Status: ACTIVE | Noted: 2018-09-18

## 2018-09-21 ENCOUNTER — TELEPHONE (OUTPATIENT)
Dept: GASTROENTEROLOGY | Facility: CLINIC | Age: 53
End: 2018-09-21

## 2018-09-21 NOTE — TELEPHONE ENCOUNTER
Patient called due to symptoms that she was experiencing over the past 12 hours.  Patient states that last night she ate dinner and threw up then she threw up about 4 or 5 more times throughout the evening patient states that she feels as though her bones hurt in her legs feel very heavy.  Patient states that she cannot get warm but does not think she is running a fever.  Patient has not had diarrhea at this time but she is having burning in her abdomen consistent with stomach pain.  Patient states that she is very fatigued.    After conversing with patient it sounds like she is experiencing illness she is can monitor herself for the next 24-48 hours and if she has increasing abdominal pain does not have a bowel movement or continues to vomit and unable to keep down fluids she will report to her local ED.  Patient understands and agrees with plan of care.                                                                                                                                                                                                                                                                                                                                                                                                                                                                                                        \

## 2018-10-18 DIAGNOSIS — Z90.410 ACQUIRED TOTAL ABSENCE OF PANCREAS: ICD-10-CM

## 2018-10-19 NOTE — TELEPHONE ENCOUNTER
amylase-lipase-protease (CREON 24) [id number] UNITS CPEP per EC capsule  Last Written Prescription Date:  3/10/18  Last Fill Quantity: 450,   # refills: 6  Last Office Visit : 9/5/18  Future Office visit:  None

## 2018-10-22 ENCOUNTER — TELEPHONE (OUTPATIENT)
Dept: PSYCHOLOGY | Facility: CLINIC | Age: 53
End: 2018-10-22

## 2018-10-24 ENCOUNTER — TELEPHONE (OUTPATIENT)
Dept: INTERNAL MEDICINE | Facility: CLINIC | Age: 53
End: 2018-10-24

## 2018-10-31 ENCOUNTER — TELEPHONE (OUTPATIENT)
Dept: INTERNAL MEDICINE | Facility: CLINIC | Age: 53
End: 2018-10-31

## 2018-10-31 NOTE — TELEPHONE ENCOUNTER
[facility] Call Center    Phone Message    May a detailed message be left on voicemail: yes    Reason for Call: Other: Wants to know if Dr. Paul recieved disability form for pt     Action Taken: Message routed to:  [facility] ([facility]): SHAMIKA

## 2018-11-01 ENCOUNTER — TELEPHONE (OUTPATIENT)
Dept: PSYCHOLOGY | Facility: CLINIC | Age: 53
End: 2018-11-01

## 2018-11-12 DIAGNOSIS — K59.09 OTHER CONSTIPATION: ICD-10-CM

## 2018-11-13 ENCOUNTER — MYC REFILL (OUTPATIENT)
Dept: TRANSPLANT | Facility: CLINIC | Age: 53
End: 2018-11-13

## 2018-11-13 DIAGNOSIS — Z90.410 POST-PANCREATECTOMY DIABETES (H): ICD-10-CM

## 2018-11-13 DIAGNOSIS — L29.9 ITCHING: ICD-10-CM

## 2018-11-13 DIAGNOSIS — Z90.410 HISTORY OF PANCREATECTOMY: ICD-10-CM

## 2018-11-13 DIAGNOSIS — E89.1 POST-PANCREATECTOMY DIABETES (H): ICD-10-CM

## 2018-11-13 DIAGNOSIS — K86.89 PANCREATIC INSUFFICIENCY: ICD-10-CM

## 2018-11-13 DIAGNOSIS — E13.9 POST-PANCREATECTOMY DIABETES (H): ICD-10-CM

## 2018-11-13 RX ORDER — PROCHLORPERAZINE MALEATE 5 MG
TABLET ORAL
Qty: 90 TABLET | Refills: 3 | Status: CANCELLED | OUTPATIENT
Start: 2018-11-13

## 2018-11-15 RX ORDER — LUBIPROSTONE 8 UG/1
5 CAPSULE ORAL DAILY
Qty: 150 CAPSULE | Refills: 3 | Status: SHIPPED | OUTPATIENT
Start: 2018-11-15 | End: 2019-02-19

## 2018-11-19 ENCOUNTER — PATIENT OUTREACH (OUTPATIENT)
Dept: CARE COORDINATION | Facility: CLINIC | Age: 53
End: 2018-11-19

## 2018-11-20 ASSESSMENT — ENCOUNTER SYMPTOMS
FATIGUE: 1
DISTURBANCES IN COORDINATION: 0
ABDOMINAL PAIN: 1
SEIZURES: 0
DIZZINESS: 0
SPEECH CHANGE: 0
SLEEP DISTURBANCES DUE TO BREATHING: 0
SYNCOPE: 0
JOINT SWELLING: 0
POLYDIPSIA: 0
PALPITATIONS: 0
BACK PAIN: 0
EXERCISE INTOLERANCE: 0
BLOATING: 1
BOWEL INCONTINENCE: 0
INCREASED ENERGY: 1
ALTERED TEMPERATURE REGULATION: 1
STIFFNESS: 0
MYALGIAS: 1
HOARSE VOICE: 1
MEMORY LOSS: 0
ORTHOPNEA: 0
MUSCLE CRAMPS: 1
LEG PAIN: 1
MUSCLE WEAKNESS: 0
BLOOD IN STOOL: 0
EYE WATERING: 0
RECTAL PAIN: 0
TASTE DISTURBANCE: 0
WEAKNESS: 0
POLYPHAGIA: 0
WEIGHT GAIN: 0
HEARTBURN: 1
FEVER: 0
NECK PAIN: 1
NUMBNESS: 0
DIARRHEA: 1
ARTHRALGIAS: 1
VOMITING: 1
EYE PAIN: 1
HEADACHES: 1
LOSS OF CONSCIOUSNESS: 0
EYE IRRITATION: 1
EYE REDNESS: 0
LIGHT-HEADEDNESS: 1
CONSTIPATION: 1
DECREASED APPETITE: 1
DOUBLE VISION: 0
SORE THROAT: 1
TINGLING: 0
SMELL DISTURBANCE: 0
JAUNDICE: 0
HALLUCINATIONS: 0
NIGHT SWEATS: 1
NECK MASS: 0
SINUS CONGESTION: 1
WEIGHT LOSS: 0
NAUSEA: 1
SINUS PAIN: 1
HYPERTENSION: 0
TREMORS: 0
HYPOTENSION: 0
PARALYSIS: 0
CHILLS: 1
TROUBLE SWALLOWING: 1

## 2018-11-21 ENCOUNTER — OFFICE VISIT (OUTPATIENT)
Dept: GASTROENTEROLOGY | Facility: CLINIC | Age: 53
End: 2018-11-21
Payer: COMMERCIAL

## 2018-11-21 VITALS
HEIGHT: 68 IN | SYSTOLIC BLOOD PRESSURE: 104 MMHG | BODY MASS INDEX: 23.25 KG/M2 | TEMPERATURE: 98.5 F | HEART RATE: 80 BPM | WEIGHT: 153.4 LBS | DIASTOLIC BLOOD PRESSURE: 68 MMHG | OXYGEN SATURATION: 99 %

## 2018-11-21 DIAGNOSIS — K21.9 GASTROESOPHAGEAL REFLUX DISEASE, ESOPHAGITIS PRESENCE NOT SPECIFIED: Primary | ICD-10-CM

## 2018-11-21 DIAGNOSIS — Z90.410 POST-PANCREATECTOMY DIABETES (H): ICD-10-CM

## 2018-11-21 DIAGNOSIS — R11.0 NAUSEA: ICD-10-CM

## 2018-11-21 DIAGNOSIS — Z90.410 HISTORY OF PANCREATECTOMY: ICD-10-CM

## 2018-11-21 DIAGNOSIS — K86.89 PANCREATIC INSUFFICIENCY: ICD-10-CM

## 2018-11-21 DIAGNOSIS — L29.9 ITCHING: ICD-10-CM

## 2018-11-21 DIAGNOSIS — E13.9 POST-PANCREATECTOMY DIABETES (H): ICD-10-CM

## 2018-11-21 DIAGNOSIS — E89.1 POST-PANCREATECTOMY DIABETES (H): ICD-10-CM

## 2018-11-21 RX ORDER — PROMETHAZINE HYDROCHLORIDE 25 MG/1
25 TABLET ORAL AT BEDTIME
Qty: 60 TABLET | Refills: 3 | Status: SHIPPED | OUTPATIENT
Start: 2018-11-21 | End: 2019-07-02

## 2018-11-21 RX ORDER — PROCHLORPERAZINE MALEATE 5 MG
TABLET ORAL
Qty: 90 TABLET | Refills: 3 | Status: SHIPPED | OUTPATIENT
Start: 2018-11-21 | End: 2019-07-02

## 2018-11-21 ASSESSMENT — PAIN SCALES - GENERAL: PAINLEVEL: MILD PAIN (3)

## 2018-11-21 NOTE — PATIENT INSTRUCTIONS
It was a pleasure taking care of you today.  I've included a brief summary of our discussion and care plan from today's visit below.  Please review this information with your primary care provider.  ______________________________________________________________________    My recommendations are summarized as follows:    -- continue compazine as needed up 2 5mg tablets every 6 hours     -- continue phenergan at night (25 mg)    -- continue Amitiza (5 tablets a day)    -- continue Senna for the time being     -- continue Colace daily    -- can do clean-out or partial clean out with magnesium citrate or golytley     -- continue Creon     -- let us know if you have worsening constipation or loose stools     -- omeprazole 40 a day on an empty stomach    -- start 300 mg of zantac a day     Return to GI Clinic in 3 months to review your progress.    ______________________________________________________________________    Who do I call with any questions after my visit?  Please be in touch if there are any further questions that arise following today's visit.  There are multiple ways to contact your gastroenterology care team.        During business hours, you may reach a Gastroenterology nurse at [phone]      To schedule or reschedule an appointment, please call [phone].       You can always send a secure message through [employer].  [employer] messages are answered by your nurse or doctor typically within 24 hours.  Please allow extra time on weekends and holidays.        For urgent/emergent questions after business hours, you may reach the on-call GI Fellow by contacting the [facility] at [phone].     How will I get the results of any tests ordered?    You will receive all of your results.  If you have signed up for [employer], any tests ordered at your visit will be available to you after your physician reviews them.  Typically this takes 1-2 weeks.  If there are urgent results that require a  change in your care plan, your physician or nurse will call you to discuss the next steps.      What is [employer]?  Swype is a secure way for you to access all of your healthcare records from the [facility].  It is a web based computer program, so you can sign on to it from any location.  It also allows you to send secure messages to your care team.  I recommend signing up for Swype access if you have not already done so and are comfortable with using a computer.      How to I schedule a follow-up visit?  If you did not schedule a follow-up visit today, please call [phone] to schedule a follow-up office visit.        Sincerely,    Rosanne Marti PA-C  Division of Gastroenterology, Hepatology & Nutrition  [facility]

## 2018-11-21 NOTE — MR AVS SNAPSHOT
After Visit Summary   11/21/2018    Dinora Mcghee    MRN: [id number]           Patient Information     Date Of Birth          1965        Visit Information        Provider Department      11/21/2018 10:00 AM Rosanne Marti PA-C [facility]        Today's Diagnoses     Gastroesophageal reflux disease, esophagitis presence not specified    -  1    Itching        Post-pancreatectomy diabetes (H)        History of pancreatectomy        Pancreatic insufficiency        Nausea          Care Instructions    It was a pleasure taking care of you today.  I've included a brief summary of our discussion and care plan from today's visit below.  Please review this information with your primary care provider.  ______________________________________________________________________    My recommendations are summarized as follows:    -- continue compazine as needed up 2 5mg tablets every 6 hours     -- continue phenergan at night (25 mg)    -- continue Amitiza (5 tablets a day)    -- continue Senna for the time being     -- continue Colace daily    -- can do clean-out or partial clean out with magnesium citrate or golytley     -- continue Creon     -- let us know if you have worsening constipation or loose stools     -- omeprazole 40 a day on an empty stomach    -- start 300 mg of zantac a day     Return to GI Clinic in 3 months to review your progress.    ______________________________________________________________________    Who do I call with any questions after my visit?  Please be in touch if there are any further questions that arise following today's visit.  There are multiple ways to contact your gastroenterology care team.        During business hours, you may reach a Gastroenterology nurse at [phone]      To schedule or reschedule an appointment, please call [phone].       You can always send a secure message through [employer]ilix.  [employer] messages are  answered by your nurse or doctor typically within 24 hours.  Please allow extra time on weekends and holidays.        For urgent/emergent questions after business hours, you may reach the on-call GI Fellow by contacting the [facility]  at [phone].     How will I get the results of any tests ordered?    You will receive all of your results.  If you have signed up for Braintechhart, any tests ordered at your visit will be available to you after your physician reviews them.  Typically this takes 1-2 weeks.  If there are urgent results that require a change in your care plan, your physician or nurse will call you to discuss the next steps.      What is Braintechhart?  My[employer]Place is a secure way for you to access all of your healthcare records from the [facility].  It is a web based computer program, so you can sign on to it from any location.  It also allows you to send secure messages to your care team.  I recommend signing up for [employer]winPlace access if you have not already done so and are comfortable with using a computer.      How to I schedule a follow-up visit?  If you did not schedule a follow-up visit today, please call [phone] to schedule a follow-up office visit.        Sincerely,    Rosanne Marti PA-C  Division of Gastroenterology, Hepatology & Nutrition  [facility]              Follow-ups after your visit        Your next 10 appointments already scheduled     Sep 13, 2019  2:00 PM CDT   (Arrive by 1:45 PM)   Return Visit with Anita Becerra MD   [facility] ([facility])    [address]  [address]  [place] [address] [phone]              Who to contact     Please call your clinic at [phone] to:    Ask questions about your health    Make or cancel appointments    Discuss your medicines    Learn about your test results    Speak to your doctor            Additional Information About Your Visit        amaysimt  "Information     Frontback gives you secure access to your electronic health record. If you see a primary care provider, you can also send messages to your care team and make appointments. If you have questions, please call your primary care clinic.  If you do not have a primary care provider, please call [phone] and they will assist you.      Frontback is an electronic gateway that provides easy, online access to your medical records. With Frontback, you can request a clinic appointment, read your test results, renew a prescription or communicate with your care team.     To access your existing account, please contact your [facility] or call [phone] for assistance.        Care EveryWhere ID     This is your Care EveryWhere ID. This could be used by other organizations to access your [facility] medical records  [id number]        Your Vitals Were     Pulse Temperature Height Pulse Oximetry BMI (Body Mass Index)       80 98.5  F (36.9  C) (Oral) 1.727 m (5' 8\") 99% 23.32 kg/m2        Blood Pressure from Last 3 Encounters:   11/21/18 104/68   09/14/18 109/73   09/05/18 110/71    Weight from Last 3 Encounters:   11/21/18 69.6 kg (153 lb 6.4 oz)   09/14/18 67.5 kg (148 lb 12.8 oz)   09/05/18 68.7 kg (151 lb 6.4 oz)              Today, you had the following     No orders found for display         Today's Medication Changes          These changes are accurate as of 11/21/18 11:34 AM.  If you have any questions, ask your nurse or doctor.               Start taking these medicines.        Dose/Directions    promethazine 25 MG tablet   Commonly known as:  PHENERGAN   Used for:  Nausea   Started by:  Rosanne Marti PA-C        Dose:  25 mg   Take 1 tablet (25 mg) by mouth At Bedtime   Quantity:  60 tablet   Refills:  3         These medicines have changed or have updated prescriptions.        Dose/Directions    * ranitidine 150 MG tablet   Commonly known as:  ZANTAC   This may have " changed:  Another medication with the same name was added. Make sure you understand how and when to take each.   Used for:  Gastroesophageal reflux disease without esophagitis   Changed by:  Rosanne Marti PA-C        Dose:  150 mg   Take 1 tablet (150 mg) by mouth At Bedtime   Quantity:  90 tablet   Refills:  3       * ranitidine 150 MG tablet   Commonly known as:  ZANTAC   This may have changed:  You were already taking a medication with the same name, and this prescription was added. Make sure you understand how and when to take each.   Used for:  Gastroesophageal reflux disease, esophagitis presence not specified   Changed by:  Rosanne Marti PA-C        Dose:  300 mg   Take 2 tablets (300 mg) by mouth daily   Quantity:  60 tablet   Refills:  4       * Notice:  This list has 2 medication(s) that are the same as other medications prescribed for you. Read the directions carefully, and ask your doctor or other care provider to review them with you.         Where to get your medicines      These medications were sent to [id number] - [place], [place] - [address]  [address] [facility] [address]     Phone:  [phone]     prochlorperazine 5 MG tablet    promethazine 25 MG tablet    ranitidine 150 MG tablet                Primary Care Provider Office Phone # Fax #    Latasha AVILA Beverly, APRN [phone]       [address] [place] [address]  [facility] [address]        Equal Access to Services     ABHAY HUITRON AH: Hadii monty sotelo hadasho [place], waaxda luqadaha, qaybta kaalmada adekarla, ivan guerra. So [facility] [phone].    ATENCIÓN: Si habla español, tiene a muñoz disposición servicios gratuitos de asistencia lingüística. Howard al [phone].    We comply with applicable federal civil rights laws and [place] laws. We do not discriminate on the basis of race, color, national origin, age, disability, sex, sexual orientation, or gender  identity.            Thank you!     Thank you for choosing [facility]  for your care. Our goal is always to provide you with excellent care. Hearing back from our patients is one way we can continue to improve our services. Please take a few minutes to complete the written survey that you may receive in the mail after your visit with us. Thank you!             Your Updated Medication List - Protect others around you: Learn how to safely use, store and throw away your medicines at www.disposemymeds.org.          This list is accurate as of 11/21/18 11:34 AM.  Always use your most recent med list.                   Brand Name Dispense Instructions for use Diagnosis    acetaminophen 500 MG Caps      Take 2 mg by mouth 2 times daily        * amitriptyline 50 MG tablet    ELAVIL    60 tablet    Take 1 tablet (50 mg) by mouth At Bedtime    Headache disorder       * amitriptyline 10 MG tablet    ELAVIL          amylase-lipase-protease 58796-31780 units Cpep per EC capsule    CREON 24    450 capsule    Take 3-4 capsules by mouth with meals and 1-2 with snacks. Maximum 15 capsules per day.    Acquired total absence of pancreas       aspirin 81 MG EC tablet     90 tablet    Take 1 tablet (81 mg) by mouth daily    High platelet count       azelastine 0.1 % nasal spray    ASTELIN     Spray 1 spray into both nostrils 2 times daily        BD SHARPS  Misc     1 each    1 Container as needed    Post-pancreatectomy diabetes (H)       blood glucose monitoring lancets     1 Box    Use to test blood sugar 6-8 times daily or as directed.    Acute on chronic pancreatitis (H)       blood glucose monitoring test strip    PAT CONTOUR NEXT    2 Box    Use to test blood sugar 6 times daily or as directed.    Post-pancreatectomy diabetes (H)       cetirizine 10 MG tablet    zyrTEC     Take 10 mg by mouth daily    Elevated liver enzymes       diphenhydrAMINE 25 MG capsule    BENADRYL ALLERGY    56  capsule    Take 1 capsule (25 mg) by mouth every 6 hours as needed for itching or allergies    Itching, Post-pancreatectomy diabetes (H), History of pancreatectomy, Pancreatic insufficiency       docusate sodium 100 MG capsule    COLACE    60 capsule    Take 1 capsule (100 mg) by mouth 2 times daily as needed for constipation,    Itching, Post-pancreatectomy diabetes (H), History of pancreatectomy, Pancreatic insufficiency       FOLIC ACID PO      Take 1 mg by mouth daily        glucose 40 % (400 mg/mL) Gel gel     3 Tube    Take 15-30 g by mouth every 15 minutes as needed for low blood sugar    Acquired total absence of pancreas       HYDROmorphone 2 MG tablet    DILAUDID    15 tablet    Take 0.5-1 tablets (1-2 mg) by mouth every 3 hours as needed for moderate to severe pain    Acute post-operative pain       ibuprofen 400 MG tablet    ADVIL/MOTRIN    30 tablet    Take 1 tablet (400 mg) by mouth every 6 hours as needed for moderate pain    Acute post-operative pain       LORazepam 1 MG tablet    ATIVAN    10 tablet    Take 1 tablet (1 mg) by mouth every 6 hours as needed for anxiety    Adjustment disorder with anxious mood       Lubiprostone 8 MCG Caps capsule    AMITIZA    150 capsule    Take 5 capsules (40 mcg) by mouth daily Take 3 capsules in AM and 2 capsules in PM    Other constipation       multivitamin CF formula capsule    CHOICEFUL     Take 1 tablet by mouth daily    Itching, Post-pancreatectomy diabetes (H), History of pancreatectomy, Pancreatic insufficiency       omeprazole 40 MG capsule    priLOSEC    90 capsule    Take 1 capsule (40 mg) by mouth daily    Gastroesophageal reflux disease without esophagitis       order for DME     1 Device    Equipment being ordered:Cefaly    Headache disorder       prochlorperazine 5 MG tablet    COMPAZINE    90 tablet    Take two 5 mg tablets by mouth every six hours as needed for nausea.    Itching, Post-pancreatectomy diabetes (H), History of pancreatectomy,  Pancreatic insufficiency       promethazine 25 MG tablet    PHENERGAN    60 tablet    Take 1 tablet (25 mg) by mouth At Bedtime    Nausea       * ranitidine 150 MG tablet    ZANTAC    90 tablet    Take 1 tablet (150 mg) by mouth At Bedtime    Gastroesophageal reflux disease without esophagitis       * ranitidine 150 MG tablet    ZANTAC    60 tablet    Take 2 tablets (300 mg) by mouth daily    Gastroesophageal reflux disease, esophagitis presence not specified       senna-docusate 8.6-50 MG per tablet    SENOKOT-S;PERICOLACE    100 tablet    Take 1-2 tablets by mouth 2 times daily    Incisional hernia, without obstruction or gangrene       SYNTHROID 137 MCG tablet   Generic drug:  levothyroxine      Take 137 mcg by mouth daily        VAGIFEM 10 MCG Tabs vaginal tablet   Generic drug:  estradiol           ZOMIG 5 MG tablet   Generic drug:  ZOLMitriptan      Take 5 mg by mouth at onset of headache for migraine        * Notice:  This list has 4 medication(s) that are the same as other medications prescribed for you. Read the directions carefully, and ask your doctor or other care provider to review them with you.

## 2018-11-21 NOTE — PROGRESS NOTES
GI CLINIC VISIT    CC/REFERRING MD:  Vijaya Glynn*  REASON FOR CONSULTATION: follow-up     ASSESSMENT/PLAN:  1.  Constipation, nausea, vomiting, and increased bloating  Patient's constipation acutely worsened due to going 4 days without any Amitiza.  It is likely that worsening constipation led to worsening upper GI symptoms including nausea, and vomiting and GERD.  We discussed that she should be aggressive in managing her bowel movements with using Amitiza at 5 tablets a day, and continuing senna as she had been taking in addition to Colace.  We also discussed that partial cleanout with magnesium citrate or GoLYTELY may help improve symptoms.  If patient has increased episodes of loose stools associated with severe bloating, she was instructed to contact our clinic and we can consider retreating for SIBO with rifaximin which has improved symptoms in the past.  She should also continue to take Creon as she has been taking.  For upper GI symptoms, we discussed taking omeprazole 40 mg a day on an empty stomach 30-60 minutes before eating while also taking Zantac as needed.  Symptoms will hopefully improve with adequate regulation of bowels.  Can also optimize anti-nausea regimen with Compazine and Phenergan 25 mg at night.  Would recommend avoiding scopolamine as she experiences side effects with this including drowsiness and dry mouth.  -- continue compazine as needed up 2 5mg tablets every 6 hours   -- continue phenergan at night (25 mg)  -- continue Amitiza (5 tablets a day)  -- continue Senna for the time being   -- continue Colace daily  -- can do clean-out or partial clean out with magnesium citrate or golytley   -- continue Creon   -- let us know if you have worsening constipation or loose stools   -- omeprazole 40 a day on an empty stomach  -- start 300 mg of zantac a day     RTC 3 months     Thank you for this consultation. Patient discussed in brief with Dr. Genao. It was a pleasure to  participate in the care of this patient; please contact us with any further questions.     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     Summarized from previous documentation with Dr. Genao, please see her notes (most recently 9/5/18) for additional details     Abdominal Pain   Patient previously had severe daily abdominal pain prior to TPIAT which she previously followed with the pain clinic and requiring ED visits.  One CT scan showed abdominal wall fat stranding without cellulitis or pathology, and MR abdomen from showed fluid collection in the area.  MRI in July 2017 did not show persistence of this finding but showed heterogeneous area of enhancement in the dome of the liver which had improved.  This specific pain has improved significantly and is not currently bothering the patient.    Gastroparesis  Gastric emptying study with 2-hour study at [facility] have been consistent with gastroparesis, patient had follow-up study here 6/27/2016 with 49% remaining at 4 hours however the patient was not aware she was on narcotics at the time.  Patient had previous GJ tubes in fall 2016, NG tube used for venting with tube feeds and was able to wean off of tube feeds after TPA T.  At initial visit she reported severe migraine headaches associated with vomiting and is on amitriptyline for migraine prophylaxis.  Patient has used several medication for the symptoms including scopolamine patch, pro-chloro para Rufino, promethazine which helped  symptoms.  She has also been seen by neurology and psychology due to concern that she has a difficult time sleeping and with migraines.  At her last visit, patient had been working with dietition to increase caloric intake and was maintaining weight around 150 lbs. She reported occasional nausea without vomiting, with 1 episode in the last month. Symptoms may have been worsened due to anxiety regarding surgery and ativan had helped.      Today the patient reports right that she continues to have dry heaving in the morning occurring after breakfast, occasionally after lunch, and oftentimes after dinner.  She is vomiting on average 1-2 times a week which can occur in the morning with waking up or in the evenings with medications.  She feels nauseated on average 3-5 days a week.  She has been conscientious about following diet including eating less raw vegetables, more cooked vegetables, 5-6 meals a day and limiting fat and sugar.  She notes that symptoms seem to be worse than last visit and worsening symptoms occurred in conjunction with worsening constipation.  She has been using scopolamine patches but has increased dry mouth and drowsiness with this medication.  She is also taking Compazine 5 times a week in addition to Phenergan at night about 4 times a week.  She is working on becoming more active and getting back into movement and exercise.     Constipation/irregular bowel movements/bloating  Patient reports history of ongoing constipation for over 20 years after having her first child.  She has tried multiple interventions including a bowel cleanout, MiraLAX, senna, milk of magnesium, Linzess, and Amitiza for which she was on when she first came to [facility] and  GI clinic.  Patient also takes creon.  Patient has done a course of rifaximin in the past with with improvement in bloating and stool consistency.  Patient has also been on specific SIBO/FODMAP diets to the [facility] while avoiding certain food  triggers.    Today She had stopped nightly senna but notes continued abdomen discomfort which is more painful when she experiences stomach gurgling. Three weeks ago she became very constipated and vomited. She then completed a moviprep which she has done 3 times in the past year. She notes that last week, she ran out of amitiza and was off this medication for four days. When she did not have this, she said stools appeared greasy and loose and occurred up to 20 times a day. She then became constipated and did not have a bowel movement for three days. After she restarted amitiza, she did not have a bowel movement for three days which was associated with bloating, visible distension of her abdomen, and gas. Today she had a substantial bowel movement described as Seattle Scale 4. Patient had also continues senna 1-2 times a week and continued colace BID, and took one bisacodyl pill last night. She notes occasional bright red blood per rectum but states this is rare.     GERD, Dysphagia   Summarized/taken from prior documentation   Patient experiences GERD as substernal and throat burning with nocturnal relaxant causing choking. She had previously followed with Dr. George Flores in out clinic and reports a prior Schatzki's ring requiring previous dilation. Manometry was noral, and pH impedence had a DeMeester of 24 with positive symptoms association. Patient has treated GERD symptoms with BID PPI, Carafate, H2 blocker, and tums.     Today she notes that since her previous visit, she has been experiencing heartburn and has restarted omeprazole 40 mg daily. She notes symptoms are at their worst 2 days a week usually tied to a specific food trigger such as onions. At least once a week, she will feel as through food is stuck and will have to drink water to try and wash it down. Symptoms are worse in the morning after laying down.       ROS:    No fevers or chills  No weight loss  No blurry vision, double vision or change in  vision  No sore throat  No lymphadenopathy  No headache, paraesthesias, or weakness in a limb  No shortness of breath or wheezing  No chest pain or pressure  No arthralgias or myalgias  No rashes or skin changes  No odynophagia or dysphagia  No BRBPR, hematochezia, melena  No dysuria, frequency or urgency  No hot/cold intolerance or polyria  No anxiety or depression    + fingers and feet are swollen     PROBLEM LIST  Patient Active Problem List    Diagnosis Date Noted     Headache, chronic migraine without aura 09/18/2018     Priority: Medium     Chronic pain syndrome 09/18/2018     Priority: Medium     S/P hernia repair 08/23/2018     Priority: Medium     Incisional hernia, without obstruction or gangrene 05/20/2018     Priority: Medium     Adhesive capsulitis of shoulder, unspecified laterality 11/14/2017     Priority: Medium     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  Document previously scanned on 1/12/17.  Validation form completed and scanned.  Code Status reflects choices in most recent ACP document..  Confirmed/documented designated decision maker(s).  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 Provider to review and confirm.       GERD (gastroesophageal reflux disease) 12/01/2010     Priority: Medium     Lumbago 04/18/2005     Priority:  Medium     History of L5-S1 degenerative disk disease.         Sprain and strain of other specified sites of hip and thigh 2002     Priority: Medium     Chondromalacia of patella 2002     Priority: Medium     Need for prophylactic immunotherapy      Priority: Medium     trees, grass, srw, dust mites, cat       Allergic rhinitis due to other allergen 2001     Priority: Medium     Hypothyroidism      Priority: Medium     Problem list name updated by automated process. Provider to review       Sensorineural hearing loss 01/10/2005     Priority: Medium     Problem list name updated by automated process. Provider to review       Vertiginous syndrome and labyrinthine disorder 01/10/2005     Priority: Medium     Problem list name updated by automated process. Provider to review         PERTINENT PAST MEDICAL HISTORY:  Past Medical History:   Diagnosis Date     Allergic rhinitis, cause unspecified      Allergy to other foods     strawberries, apples, celeries, alice, watermelon     Arthritis     left knee     Choledocholithiasis     long after cholecystectomy     Chronic abdominal pain      Chronic constipation      Chronic nausea      Chronic pancreatitis (H)      Degeneration of lumbar or lumbosacral intervertebral disc      Esophageal reflux     w/ hiatal hernia     Gastroparesis      Hiatal hernia      History of pituitary adenoma     s/p resection     Hypothyroidism      Migraines      Mild hyperlipidemia      On tube feeding diet     presence of GJ tube     Pancreatic disease     PD stricture, 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 endometriosis growth     APPENDECTOMY       C  DELIVERY ONLY       C  DELIVERY ONLY       repeat c section with incidental cystotomy with repair     C EXCIS PITUITARY,TRANSNASAL/SEPTAL  1980    pituitary tumor removed for diabetes insipidus     C TOTAL ABDOM HYSTERECTOMY      w/ bilateral salpingoophorectomy      C WRIST ARTHROSCOP,RELEASE XVERS LIG Bilateral 08      SECTION       COLONOSCOPY       ENDOSCOPIC RETROGRADE CHOLANGIOPANCREATOGRAM N/A 2015    Procedure: ENDOSCOPIC RETROGRADE CHOLANGIOPANCREATOGRAM;  Surgeon: Mandeep Park MD;  [facility]     ENDOSCOPIC RETROGRADE CHOLANGIOPANCREATOGRAM N/A 2016    Procedure: COMBINED ENDOSCOPIC RETROGRADE CHOLANGIOPANCREATOGRAPHY, PLACE TUBE/STENT;  Surgeon: Mandeep Park MD;  [facility]     ENDOSCOPIC RETROGRADE CHOLANGIOPANCREATOGRAM N/A 3/17/2016    Procedure: COMBINED ENDOSCOPIC RETROGRADE CHOLANGIOPANCREATOGRAPHY, REMOVE FOREIGN BODY OR STENT/TUBE;  Surgeon: Mandeep Park MD;  [facility]     ENDOSCOPIC RETROGRADE CHOLANGIOPANCREATOGRAM N/A 2016    Procedure: COMBINED ENDOSCOPIC RETROGRADE CHOLANGIOPANCREATOGRAPHY, PLACE TUBE/STENT;  Surgeon: Mandeep Park MD;  [facility]     ENDOSCOPIC RETROGRADE CHOLANGIOPANCREATOGRAM N/A 2016    Procedure: COMBINED ENDOSCOPIC RETROGRADE CHOLANGIOPANCREATOGRAPHY, REMOVE FOREIGN BODY OR STENT/TUBE;  Surgeon: Mandeep Park MD;  [facility]     ENDOSCOPIC ULTRASOUND UPPER GASTROINTESTINAL TRACT (GI) N/A 10/3/2016    Procedure: ENDOSCOPIC ULTRASOUND, ESOPHAGOSCOPY / UPPER GASTROINTESTINAL TRACT (GI);  Surgeon: Guru Jose Rodas MD;  [facility]     ESOPHAGOSCOPY, GASTROSCOPY, DUODENOSCOPY (EGD), COMBINED N/A 2015    Procedure: COMBINED ESOPHAGOSCOPY, GASTROSCOPY, DUODENOSCOPY (EGD), REMOVE FOREIGN BODY;  Surgeon: Mandeep Park MD;  [facility]     ESOPHAGOSCOPY, GASTROSCOPY, DUODENOSCOPY (EGD), COMBINED N/A 10/25/2015    Procedure: COMBINED ESOPHAGOSCOPY,  GASTROSCOPY, DUODENOSCOPY (EGD);  Surgeon: Sammy Amaro MD;  [facility]     ESOPHAGOSCOPY, GASTROSCOPY, DUODENOSCOPY (EGD), COMBINED N/A 10/25/2015    Procedure: COMBINED ESOPHAGOSCOPY, GASTROSCOPY, DUODENOSCOPY (EGD), BIOPSY SINGLE OR MULTIPLE;  Surgeon: Sammy Amaro MD;  [facility]     ESOPHAGOSCOPY, GASTROSCOPY, DUODENOSCOPY (EGD), DILATATION, COMBINED       EXCISE LESION TRUNK N/A 4/17/2017    Procedure: EXCISE LESION TRUNK;  Removal of Abdominal Foreign Body;  Surgeon: Nestor Phoenix MD;  [facility]     HC ESOPH/GAS REFLUX TEST W NASAL IMPED >1 HR N/A 11/19/2015    Procedure: ESOPHAGEAL IMPEDENCE FUNCTION TEST WITH 24 HOUR PH GREATER THAN 1 HOUR;  Surgeon: Thiago Apple MD;  Location: [facility]     HC UGI ENDOSCOPY DIAG W BIOPSY  9/17/08     HC UGI ENDOSCOPY DIAG W BIOPSY  9/27/12     HC UGI ENDOSCOPY W ESOPHAGEAL DILATION BALLOON <30MM  9/17/08     HC UGI ENDOSCOPY W EUS N/A 5/5/2015    Procedure: COMBINED ENDOSCOPIC ULTRASOUND, ESOPHAGOSCOPY, GASTROSCOPY, DUODENOSCOPY (EGD);  Surgeon: Wm Dueñas MD;  [facility]     INJECT TRANSVERSUS ABDOMINIS PLANE (TAP) BLOCK BILATERAL Left 9/22/2016    Procedure: INJECT TRANSVERSUS ABDOMINIS PLANE (TAP) BLOCK BILATERAL;  Surgeon: Dickson Corrigan MD;  [facility]     laparoscopic pineda  1995     LAPAROSCOPIC HERNIORRHAPHY INCISIONAL N/A 8/23/2018    Procedure: LAPAROSCOPIC HERNIORRHAPHY INCISIONAL;  Laparoscopic Incisional Hernia Repair with Symbotex Mesh Implant;  Surgeon: Nestor Phoenix MD;  [facility]     LAPAROSCOPIC PANCREATECTOMY, TRANSPLANT AUTO ISLET CELL N/A 12/28/2016    Procedure: LAPAROSCOPIC PANCREATECTOMY, TRANSPLANT AUTO ISLET CELL;  Surgeon: Nestor Phoenix MD;  [facility]     transphenoidal pituitary resection  1990       ALLERGIES:  Allergies   Allergen Reactions     Apple Anaphylaxis     Depakote [Divalproex Sodium] Other (See Comments)     Chest pain     Zithromax [Azithromycin  Dihydrate] Anaphylaxis     Noted in 4/7/08 ER     Darvocet [Propoxyphene N-Apap] Itching     Morphine Nausea and Vomiting and Rash     Nalbuphine Hcl Rash     RASH :nubaine     Zosyn [Piperacillin-Tazobactam In D5w] Rash     Possible allergy, did have a diffuse rash that seemed drug related but could have also been related to soap in the hospital.      Bactrim [Sulfamethoxazole W-Trimethoprim] Other (See Comments) and Nausea and Vomiting     Severely low liver function.     Cats      Compazine [Prochlorperazine] Other (See Comments)     Twitching. Takes Benedryl and is fine     Corticosteroids Other (See Comments)     All oral,IV and injectable steroids are contraindicated (unless in life threatening situations) in [facility] transplant recipients. They can cause irreversible loss of islet cell function. Please contact patients transplant care coordinator Mecca Watkins RN BSN @ [phone]/Pager [phone], and Endocrinologist prior to administration.     Dust Mites      Grass      Prednisone Other (See Comments)     Insomnia       Ragweeds      Tape [Adhesive Tape] Blisters     Tramadol      Trees      Zofran [Ondansetron] Other (See Comments)     migraine     Flagyl [Metronidazole] Hives and Rash       PERTINENT MEDICATIONS:    Current Outpatient Prescriptions:      acetaminophen 500 MG CAPS, Take 2 mg by mouth 2 times daily, Disp: , Rfl:      amitriptyline (ELAVIL) 10 MG tablet, , Disp: , Rfl: 0     amitriptyline (ELAVIL) 50 MG tablet, Take 1 tablet (50 mg) by mouth At Bedtime, Disp: 60 tablet, Rfl: 3     amylase-lipase-protease (CREON 24) 42049-16946 units CPEP per EC capsule, Take 3-4 capsules by mouth with meals and 1-2 with snacks. Maximum 15 capsules per day., Disp: 450 capsule, Rfl: 11     aspirin 81 MG EC tablet, Take 1 tablet (81 mg) by mouth daily, Disp: 90 tablet, Rfl: 3     azelastine (ASTELIN) 0.1 % spray, Spray 1 spray into both nostrils 2 times daily, Disp: , Rfl:      blood glucose monitoring  (PAT CONTOUR NEXT) test strip, Use to test blood sugar 6 times daily or as directed., Disp: 2 Box, Rfl: 11     blood glucose monitoring (PAT MICROLET) lancets, Use to test blood sugar 6-8 times daily or as directed., Disp: 1 Box, Rfl: prn     cetirizine (ZYRTEC) 10 MG tablet, Take 10 mg by mouth daily, Disp: , Rfl:      diphenhydrAMINE (BENADRYL ALLERGY) 25 MG capsule, Take 1 capsule (25 mg) by mouth every 6 hours as needed for itching or allergies, Disp: 56 capsule, Rfl: 0     docusate sodium (COLACE) 100 MG capsule, Take 1 capsule (100 mg) by mouth 2 times daily as needed for constipation,, Disp: 60 capsule, Rfl: 0     estradiol (VAGIFEM) 10 MCG TABS vaginal tablet, , Disp: , Rfl:      FOLIC ACID PO, Take 1 mg by mouth daily, Disp: , Rfl:      glucose 40 % GEL gel, Take 15-30 g by mouth every 15 minutes as needed for low blood sugar, Disp: 3 Tube, Rfl: 2     HYDROmorphone (DILAUDID) 2 MG tablet, Take 0.5-1 tablets (1-2 mg) by mouth every 3 hours as needed for moderate to severe pain (Patient not taking: Reported on 9/5/2018), Disp: 15 tablet, Rfl: 0     ibuprofen (ADVIL/MOTRIN) 400 MG tablet, Take 1 tablet (400 mg) by mouth every 6 hours as needed for moderate pain, Disp: 30 tablet, Rfl: 0     levothyroxine (SYNTHROID) 137 MCG tablet, Take 137 mcg by mouth daily, Disp: , Rfl:      LORazepam (ATIVAN) 1 MG tablet, Take 1 tablet (1 mg) by mouth every 6 hours as needed for anxiety (Patient not taking: Reported on 9/5/2018), Disp: 10 tablet, Rfl: 0     Lubiprostone (AMITIZA) 8 MCG CAPS capsule, Take 5 capsules (40 mcg) by mouth daily Take 3 capsules in AM and 2 capsules in PM, Disp: 150 capsule, Rfl: 3     multivitamin CF formula (CHOICEFUL) capsule, Take 1 tablet by mouth daily, Disp: , Rfl: 11     omeprazole (PRILOSEC) 40 MG capsule, Take 1 capsule (40 mg) by mouth daily, Disp: 90 capsule, Rfl: 3     order for DME, Equipment being ordered:Cefaly, Disp: 1 Device, Rfl: 0     prochlorperazine (COMPAZINE) 5 MG  tablet, Take two 5 mg tablets by mouth every six hours as needed for nausea., Disp: 90 tablet, Rfl: 3     ranitidine (ZANTAC) 150 MG tablet, Take 1 tablet (150 mg) by mouth At Bedtime, Disp: 90 tablet, Rfl: 3     senna-docusate (SENOKOT-S;PERICOLACE) 8.6-50 MG per tablet, Take 1-2 tablets by mouth 2 times daily, Disp: 100 tablet, Rfl: 0     Sharps Container (BD SHARPS ) MISC, 1 Container as needed, Disp: 1 each, Rfl: 1     ZOLMitriptan (ZOMIG) 5 MG tablet, Take 5 mg by mouth at onset of headache for migraine, Disp: , Rfl:     SOCIAL HISTORY:  Social History     Social History     Marital status:      Spouse name: Norris     Number of children: 3     Years of education: 16     Occupational History     director [employer]     Social History Main Topics     Smoking status: Former Smoker     Packs/day: 0.50     Years: 6.00     Types: Cigarettes     Start date: 9/1/1985     Quit date: 1/1/1992     Smokeless tobacco: Never Used      Comment: no 2nd hand     Alcohol use 1.8 - 3.6 oz/week     0 Standard drinks or equivalent, 1 - 2 Glasses of wine, 1 - 2 Cans of beer, 1 - 2 Shots of liquor per week      Comment: occasional     Drug use: No     Sexual activity: Yes     Partners: Male     Birth control/ protection: None      Comment: Hystectomy 1999     Other Topics Concern     Parent/Sibling W/ Cabg, Mi Or Angioplasty Before 65f 55m? No     Blood Transfusions No     Seat Belt Yes     Social History Narrative     with 3 children and a dog.  No smoking, etoh or drug use.  Worked as a  for [employer] in [employer] in the past.  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       FAMILY HISTORY:  Family History   Problem Relation Age of Onset     Eye Disorder Father      cataract, detached retina     Myocardial Infarction Father 60     Lipids Father      Cerebrovascular Disease Father      Depression Father      Substance Abuse Father      Anesthesia Reaction Father   "    stroke right after surgery     Cataracts Father      Osteoarthritis Father      Ulcerative Colitis Father      Lipids Mother      Hypertension Mother      Thyroid Disease Mother      Depression Mother      Angina Mother      GERD Mother      Skin Cancer Mother      Eye Disorder Son      ptosis     Eye Disorder Paternal Grandmother      cataract     Eye Disorder Paternal Grandfather      cataract     Diabetes Paternal Grandfather      Eye Disorder Maternal Grandmother      cataract     Thyroid Disease Maternal Grandmother      Coronary Artery Disease Sister      angioplasty     GERD Sister      Substance Abuse Sister      Depression Sister      Depression Son      Anxiety Disorder Son      Thyroid Disease Sister      Diabetes Maternal Grandfather      Depression Nephew      Anxiety Disorder Nephew      Thyroid Disease Nephew      Type 2 Diabetes Cousin      paternal cousin     HEART DISEASE Paternal Aunt      Diabetes Paternal Aunt      Diabetes Paternal Uncle      HEART DISEASE Paternal Uncle        Past/family/social history reviewed and no changes    PHYSICAL EXAMINATION:  Constitutional: aaox3, cooperative, pleasant, not dyspneic/diaphoretic, no acute distress  Vitals reviewed: /68  Pulse 80  Temp 98.5  F (36.9  C) (Oral)  Ht 1.727 m (5' 8\")  Wt 69.6 kg (153 lb 6.4 oz)  SpO2 99%  BMI 23.32 kg/m2  Wt:   Wt Readings from Last 2 Encounters:   11/21/18 69.6 kg (153 lb 6.4 oz)   09/14/18 67.5 kg (148 lb 12.8 oz)      Eyes: Sclera anicteric/injected  Ears/nose/mouth/throat: Normal oropharynx without ulcers or exudate, mucus membranes moist, hearing intact  Neck: supple, thyroid normal size  CV: No edema  Respiratory: Unlabored breathing  Skin: warm, perfused, no jaundice  Psych: Normal affect  MSK: Normal gait      PERTINENT STUDIES:  Orders Only on 09/13/2018   Component Date Value Ref Range Status     WBC 09/13/2018 5.8  4.0 - 11.0 10e9/L Final     RBC Count 09/13/2018 4.51  3.8 - 5.2 10e12/L Final "     Hemoglobin 09/13/2018 14.1  11.7 - 15.7 g/dL Final     Hematocrit 09/13/2018 42.1  35.0 - 47.0 % Final     MCV 09/13/2018 93  78 - 100 fl Final     MCH 09/13/2018 31.3  26.5 - 33.0 pg Final     MCHC 09/13/2018 33.5  31.5 - 36.5 g/dL Final     RDW 09/13/2018 13.2  10.0 - 15.0 % Final     Platelet Count 09/13/2018 430  150 - 450 10e9/L Final     Diff Method 09/13/2018 Automated Method   Final     % Neutrophils 09/13/2018 37.1  % Final     % Lymphocytes 09/13/2018 40.5  % Final     % Monocytes 09/13/2018 10.5  % Final     % Eosinophils 09/13/2018 9.8  % Final     % Basophils 09/13/2018 2.1  % Final     % Immature Granulocytes 09/13/2018 0.0  % Final     Nucleated RBCs 09/13/2018 0  0 /100 Final     Absolute Neutrophil 09/13/2018 2.2  1.6 - 8.3 10e9/L Final     Absolute Lymphocytes 09/13/2018 2.4  0.8 - 5.3 10e9/L Final     Absolute Monocytes 09/13/2018 0.6  0.0 - 1.3 10e9/L Final     Absolute Eosinophils 09/13/2018 0.6  0.0 - 0.7 10e9/L Final     Absolute Basophils 09/13/2018 0.1  0.0 - 0.2 10e9/L Final     Abs Immature Granulocytes 09/13/2018 0.0  0 - 0.4 10e9/L Final     Absolute Nucleated RBC 09/13/2018 0.0   Final     Sodium 09/13/2018 138  133 - 144 mmol/L Final     Potassium 09/13/2018 4.3  3.4 - 5.3 mmol/L Final     Chloride 09/13/2018 102  94 - 109 mmol/L Final     Carbon Dioxide 09/13/2018 30  20 - 32 mmol/L Final     Anion Gap 09/13/2018 5  3 - 14 mmol/L Final     Glucose 09/13/2018 119* 70 - 99 mg/dL Final     Urea Nitrogen 09/13/2018 12  7 - 30 mg/dL Final     Creatinine 09/13/2018 0.83  0.52 - 1.04 mg/dL Final     GFR Estimate 09/13/2018 72  >60 mL/min/1.7m2 Final     GFR Estimate If Black 09/13/2018 87  >60 mL/min/1.7m2 Final     Calcium 09/13/2018 9.2  8.5 - 10.1 mg/dL Final     Bilirubin Direct 09/13/2018 0.2  0.0 - 0.2 mg/dL Final     Bilirubin Total 09/13/2018 0.8  0.2 - 1.3 mg/dL Final     Albumin 09/13/2018 3.9  3.4 - 5.0 g/dL Final     Protein Total 09/13/2018 7.8  6.8 - 8.8 g/dL Final      Alkaline Phosphatase 09/13/2018 145  40 - 150 U/L Final     ALT 09/13/2018 59* 0 - 50 U/L Final     AST 09/13/2018 39  0 - 45 U/L Final     IGG 09/13/2018 1160  695 - 1620 mg/dL Final     IgG1 09/13/2018 416  300 - 856 mg/dL Final     IgG2 09/13/2018 384  158 - 761 mg/dL Final     IgG3 09/13/2018 83  24 - 192 mg/dL Final     IgG4 09/13/2018 95* 11 - 86 mg/dL Final         Answers for HPI/ROS submitted by the patient on 11/20/2018   General Symptoms: Yes  Skin Symptoms: No  HENT Symptoms: Yes  EYE SYMPTOMS: Yes  HEART SYMPTOMS: Yes  LUNG SYMPTOMS: No  INTESTINAL SYMPTOMS: Yes  URINARY SYMPTOMS: No  GYNECOLOGIC SYMPTOMS: No  BREAST SYMPTOMS: No  SKELETAL SYMPTOMS: Yes  BLOOD SYMPTOMS: No  NERVOUS SYSTEM SYMPTOMS: Yes  MENTAL HEALTH SYMPTOMS: No  Fever: No  Loss of appetite: Yes  Weight loss: No  Weight gain: No  Fatigue: Yes  Night sweats: Yes  Chills: Yes  Increased stress: Yes  Excessive hunger: No  Excessive thirst: No  Feeling hot or cold when others believe the temperature is normal: Yes  Loss of height: No  Post-operative complications: No  Surgical site pain: No  Hallucinations: No  Change in or Loss of Energy: Yes  Hyperactivity: No  Confusion: No  Ear pain: Yes  Ear discharge: No  Hearing loss: No  Tinnitus: No  Nosebleeds: No  Congestion: Yes  Sinus pain: Yes  Trouble swallowing: Yes   Voice hoarseness: Yes  Mouth sores: No  Sore throat: Yes  Tooth pain: No  Gum tenderness: No  Bleeding gums: No  Change in taste: No  Change in sense of smell: No  Dry mouth: Yes  Hearing aid used: No  Neck lump: No  Eye pain: Yes  Vision loss: No  Dry eyes: Yes  Watery eyes: No  Eye bulging: No  Double vision: No  Flashing of lights: No  Spots: No  Floaters: No  Redness: No  Crossed eyes: No  Tunnel Vision: No  Yellowing of eyes: No  Eye irritation: Yes  Chest pain or pressure: No  Fast or irregular heartbeat: No  Pain in legs with walking: Yes  Trouble breathing while lying down: No  Fingers or toes appear blue: No  High  blood pressure: No  Low blood pressure: No  Fainting: No  Murmurs: No  Pacemaker: No  Varicose veins: No  Edema or swelling: Yes  Wake up at night with shortness of breath: No  Light-headedness: Yes  Exercise intolerance: No  Heart burn or indigestion: Yes  Nausea: Yes  Vomiting: Yes  Abdominal pain: Yes  Bloating: Yes  Constipation: Yes  Diarrhea: Yes  Blood in stool: No  Black stools: No  Rectal or Anal pain: No  Fecal incontinence: No  Yellowing of skin or eyes: No  Vomit with blood: No  Change in stools: Yes  Back pain: No  Muscle aches: Yes  Neck pain: Yes  Swollen joints: No  Joint pain: Yes  Bone pain: No  Muscle cramps: Yes  Muscle weakness: No  Joint stiffness: No  Bone fracture: No  Trouble with coordination: No  Dizziness or trouble with balance: No  Fainting or black-out spells: No  Memory loss: No  Headache: Yes  Seizures: No  Speech problems: No  Tingling: No  Tremor: No  Weakness: No  Difficulty walking: No  Paralysis: No  Numbness: No

## 2018-11-21 NOTE — NURSING NOTE
"Chief Complaint   Patient presents with     RECHECK     follow up constipation       Vitals:    11/21/18 0957   BP: 104/68   Pulse: 80   Temp: 98.5  F (36.9  C)   TempSrc: Oral   SpO2: 99%   Weight: 69.6 kg (153 lb 6.4 oz)   Height: 1.727 m (5' 8\")       Body mass index is 23.32 kg/(m^2).  BLANCA Verdugo                          "

## 2018-11-21 NOTE — LETTER
11/21/2018       RE: Dinora Mcghee  [address] [facility] [address]     Dear Colleague,    Thank you for referring your patient, Dinora Mcghee, to the [facility] GASTROENTEROLOGY AND IBD CLINIC at [facility]. Please see a copy of my visit note below.    GI CLINIC VISIT    CC/REFERRING MD:  Vijaya Glynn*  REASON FOR CONSULTATION: follow-up     ASSESSMENT/PLAN:  1.  Constipation, nausea, vomiting, and increased bloating  Patient's constipation acutely worsened due to going 4 days without any Amitiza.  It is likely that worsening constipation led to worsening upper GI symptoms including nausea, and vomiting and GERD.  We discussed that she should be aggressive in managing her bowel movements with using Amitiza at 5 tablets a day, and continuing senna as she had been taking in addition to Colace.  We also discussed that partial cleanout with magnesium citrate or GoLYTELY may help improve symptoms.  If patient has increased episodes of loose stools associated with severe bloating, she was instructed to contact our clinic and we can consider retreating for SIBO with rifaximin which has improved symptoms in the past.  She should also continue to take Creon as she has been taking.  For upper GI symptoms, we discussed taking omeprazole 40 mg a day on an empty stomach 30-60 minutes before eating while also taking Zantac as needed.  Symptoms will hopefully improve with adequate regulation of bowels.  Can also optimize anti-nausea regimen with Compazine and Phenergan 25 mg at night.  Would recommend avoiding scopolamine as she experiences side effects with this including drowsiness and dry mouth.  -- continue compazine as needed up 2 5mg tablets every 6 hours   -- continue phenergan at night (25 mg)  -- continue Amitiza (5 tablets a day)  -- continue Senna for the time being   -- continue Colace daily  -- can do clean-out or partial clean out with magnesium citrate  or brian   -- continue Creon   -- let us know if you have worsening constipation or loose stools   -- omeprazole 40 a day on an empty stomach  -- start 300 mg of zantac a day     RTC 3 months     Thank you for this consultation. Patient discussed in brief with Dr. Genao. It was a pleasure to participate in the care of this patient; please contact us with any further questions.     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     Summarized from previous documentation with Dr. Genao, please see her notes (most recently 9/5/18) for additional details     Abdominal Pain   Patient previously had severe daily abdominal pain prior to TPIAT which she previously followed with the pain clinic and requiring ED visits.  One CT scan showed abdominal wall fat stranding without cellulitis or pathology, and MR abdomen from showed fluid collection in the area.  MRI in July 2017 did not show persistence of this finding but showed heterogeneous area of enhancement in the dome of the liver which had improved.  This specific pain has improved significantly and is not currently bothering the patient.    Gastroparesis  Gastric emptying study with 2-hour study at [facility] have been consistent with gastroparesis, patient had follow-up study here 6/27/2016 with 49% remaining at 4 hours however the patient was not aware she was on narcotics at the time.  Patient had previous GJ tubes in fall 2016, NG tube used for venting with tube feeds and was able to wean  off of tube feeds after TPA T.  At initial visit she reported severe migraine headaches associated with vomiting and is on amitriptyline for migraine prophylaxis.  Patient has used several medication for the symptoms including scopolamine patch, pro-chloro para Rufino, promethazine which helped symptoms.  She has also been seen by neurology and psychology due to concern that she has a difficult time sleeping and with migraines.  At her last visit, patient had been working with dietition to increase caloric intake and was maintaining weight around 150 lbs. She reported occasional nausea without vomiting, with 1 episode in the last month. Symptoms may have been worsened due to anxiety regarding surgery and ativan had helped.      Today the patient reports right that she continues to have dry heaving in the morning occurring after breakfast, occasionally after lunch, and oftentimes after dinner.  She is vomiting on average 1-2 times a week which can occur in the morning with waking up or in the evenings with medications.  She feels nauseated on average 3-5 days a week.  She has been conscientious about following diet including eating less raw vegetables, more cooked vegetables, 5-6 meals a day and limiting fat and sugar.  She notes that symptoms seem to be worse than last visit and worsening symptoms occurred in conjunction with worsening constipation.  She has been using scopolamine patches but has increased dry mouth and drowsiness with this medication.  She is also taking Compazine 5 times a week in addition to Phenergan at night about 4 times a week.  She is working on becoming more active and getting back into movement and exercise.     Constipation/irregular bowel movements/bloating  Patient reports history of ongoing constipation for over 20 years after having her first child.  She has tried multiple interventions including a bowel cleanout, MiraLAX, senna, milk of magnesium, Linzess, and Amitiza for which she was  on when she first came to [facility] and  GI clinic.  Patient also takes creon.  Patient has done a course of rifaximin in the past with with improvement in bloating and stool consistency.  Patient has also been on specific SIBO/FODMAP diets to the [facility] while avoiding certain food triggers.    Today She had stopped nightly senna but notes continued abdomen discomfort which is more painful when she experiences stomach gurgling. Three weeks ago she became very constipated and vomited. She then completed a moviprep which she has done 3 times in the past year. She notes that last week, she ran out of amitiza and was off this medication for four days. When she did not have this, she said stools appeared greasy and loose and occurred up to 20 times a day. She then became constipated and did not have a bowel movement for three days. After she restarted amitiza, she did not have a bowel movement for three days which was associated with bloating, visible distension of her abdomen, and gas. Today she had a substantial bowel movement described as Sparta Scale 4. Patient had also continues senna 1-2 times a week and continued colace BID, and took one bisacodyl pill last night. She notes occasional bright red blood per rectum but states this is rare.     GERD, Dysphagia   Summarized/taken from prior documentation   Patient experiences GERD as substernal and throat burning with nocturnal relaxant causing choking. She had previously followed with Dr. George Flores in out clinic and reports a prior Schatzki's ring requiring previous dilation. Manometry was noral, and pH impedence had a DeMeester of 24 with positive symptoms association. Patient has treated GERD symptoms with BID PPI, Carafate, H2 blocker, and tums.     Today she notes that since her previous visit, she has been experiencing heartburn and has restarted omeprazole 40 mg daily. She notes symptoms are at their worst 2 days a week usually tied to a specific food  trigger such as onions. At least once a week, she will feel as through food is stuck and will have to drink water to try and wash it down. Symptoms are worse in the morning after laying down.       ROS:    No fevers or chills  No weight loss  No blurry vision, double vision or change in vision  No sore throat  No lymphadenopathy  No headache, paraesthesias, or weakness in a limb  No shortness of breath or wheezing  No chest pain or pressure  No arthralgias or myalgias  No rashes or skin changes  No odynophagia or dysphagia  No BRBPR, hematochezia, melena  No dysuria, frequency or urgency  No hot/cold intolerance or polyria  No anxiety or depression    + fingers and feet are swollen     PROBLEM LIST  Patient Active Problem List    Diagnosis Date Noted     Headache, chronic migraine without aura 09/18/2018     Priority: Medium     Chronic pain syndrome 09/18/2018     Priority: Medium     S/P hernia repair 08/23/2018     Priority: Medium     Incisional hernia, without obstruction or gangrene 05/20/2018     Priority: Medium     Adhesive capsulitis of shoulder, unspecified laterality 11/14/2017     Priority: Medium     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  Document previously scanned on 1/12/17.  Validation form completed and scanned.  Code Status reflects choices in most recent ACP document..  Confirmed/documented designated decision maker(s).  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 Provider to review and confirm.       GERD (gastroesophageal reflux disease) 12/01/2010     Priority: Medium     Lumbago 04/18/2005     Priority: Medium     History of L5-S1 degenerative disk disease.         Sprain and strain of other specified sites of hip and thigh 12/09/2002     Priority: Medium     Chondromalacia of patella 12/09/2002     Priority: Medium     Need for prophylactic immunotherapy      Priority: Medium     trees, grass, srw, dust mites, cat       Allergic rhinitis due to other allergen 12/21/2001     Priority: Medium     Hypothyroidism      Priority: Medium     Problem list name updated by automated process. Provider to review       Sensorineural hearing loss 01/10/2005     Priority: Medium     Problem list name updated by automated process. Provider to review       Vertiginous syndrome and labyrinthine disorder 01/10/2005     Priority: Medium     Problem list name updated by automated process. Provider to review         PERTINENT PAST MEDICAL HISTORY:  Past Medical History:   Diagnosis Date     Allergic rhinitis, cause unspecified      Allergy to other foods     strawberries, apples, celeries, alice, watermelon     Arthritis     left knee     Choledocholithiasis     long after cholecystectomy     Chronic abdominal pain      Chronic constipation      Chronic nausea      Chronic pancreatitis (H)      Degeneration of lumbar or lumbosacral intervertebral disc      Esophageal reflux     w/ hiatal hernia     Gastroparesis      Hiatal hernia      History of pituitary adenoma     s/p resection     Hypothyroidism      Migraines      Mild hyperlipidemia      On tube feeding diet     presence of GJ tube     Pancreatic disease     PD stricture, 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 endometriosis growth     APPENDECTOMY       C  DELIVERY ONLY       C  DELIVERY ONLY      repeat c section with incidental cystotomy with repair     C EXCIS PITUITARY,TRANSNASAL/SEPTAL  1980    pituitary tumor removed for diabetes insipidus     C TOTAL ABDOM HYSTERECTOMY      w/ bilateral salpingoophorectomy      C WRIST ARTHROSCOP,RELEASE XVERS LIG Bilateral 08      SECTION       COLONOSCOPY       ENDOSCOPIC RETROGRADE CHOLANGIOPANCREATOGRAM N/A 2015    Procedure: ENDOSCOPIC RETROGRADE CHOLANGIOPANCREATOGRAM;  Surgeon: Mandeep Park MD;  [facility]: [facility]     ENDOSCOPIC RETROGRADE CHOLANGIOPANCREATOGRAM N/A 2016    Procedure: COMBINED ENDOSCOPIC RETROGRADE CHOLANGIOPANCREATOGRAPHY, PLACE TUBE/STENT;  Surgeon: Mandeep Park MD;  [facility]     ENDOSCOPIC RETROGRADE CHOLANGIOPANCREATOGRAM N/A 3/17/2016    Procedure: COMBINED ENDOSCOPIC RETROGRADE CHOLANGIOPANCREATOGRAPHY, REMOVE FOREIGN BODY OR STENT/TUBE;  Surgeon: Mandeep Park MD;  [facility]     ENDOSCOPIC RETROGRADE CHOLANGIOPANCREATOGRAM N/A 2016    Procedure: COMBINED ENDOSCOPIC RETROGRADE CHOLANGIOPANCREATOGRAPHY, PLACE TUBE/STENT;  Surgeon: Mandeep Park MD;  [facility]     ENDOSCOPIC RETROGRADE CHOLANGIOPANCREATOGRAM N/A 2016    Procedure: COMBINED ENDOSCOPIC RETROGRADE CHOLANGIOPANCREATOGRAPHY, REMOVE FOREIGN BODY OR STENT/TUBE;  Surgeon: Mandeep Park MD;  [facility]     ENDOSCOPIC ULTRASOUND UPPER GASTROINTESTINAL TRACT (GI) N/A 10/3/2016    Procedure: ENDOSCOPIC ULTRASOUND, ESOPHAGOSCOPY / UPPER GASTROINTESTINAL TRACT (GI);  Surgeon: Guru Jose Rodas MD;  [facility]     ESOPHAGOSCOPY, GASTROSCOPY, DUODENOSCOPY (EGD),  COMBINED N/A 6/24/2015    Procedure: COMBINED ESOPHAGOSCOPY, GASTROSCOPY, DUODENOSCOPY (EGD), REMOVE FOREIGN BODY;  Surgeon: Mandeep Park MD;  [facility]     ESOPHAGOSCOPY, GASTROSCOPY, DUODENOSCOPY (EGD), COMBINED N/A 10/25/2015    Procedure: COMBINED ESOPHAGOSCOPY, GASTROSCOPY, DUODENOSCOPY (EGD);  Surgeon: Sammy Amaro MD;  [facility]     ESOPHAGOSCOPY, GASTROSCOPY, DUODENOSCOPY (EGD), COMBINED N/A 10/25/2015    Procedure: COMBINED ESOPHAGOSCOPY, GASTROSCOPY, DUODENOSCOPY (EGD), BIOPSY SINGLE OR MULTIPLE;  Surgeon: Sammy Amaro MD;  [facility]     ESOPHAGOSCOPY, GASTROSCOPY, DUODENOSCOPY (EGD), DILATATION, COMBINED       EXCISE LESION TRUNK N/A 4/17/2017    Procedure: EXCISE LESION TRUNK;  Removal of Abdominal Foreign Body;  Surgeon: Nestor Phoenix MD;  [facility]     HC ESOPH/GAS REFLUX TEST W NASAL IMPED >1 HR N/A 11/19/2015    Procedure: ESOPHAGEAL IMPEDENCE FUNCTION TEST WITH 24 HOUR PH GREATER THAN 1 HOUR;  Surgeon: Thiago Apple MD;  Location: [facility]     HC UGI ENDOSCOPY DIAG W BIOPSY  9/17/08     HC UGI ENDOSCOPY DIAG W BIOPSY  9/27/12     HC UGI ENDOSCOPY W ESOPHAGEAL DILATION BALLOON <30MM  9/17/08     HC UGI ENDOSCOPY W EUS N/A 5/5/2015    Procedure: COMBINED ENDOSCOPIC ULTRASOUND, ESOPHAGOSCOPY, GASTROSCOPY, DUODENOSCOPY (EGD);  Surgeon: Wm Dueñas MD;  [facility]: [facility]     INJECT TRANSVERSUS ABDOMINIS PLANE (TAP) BLOCK BILATERAL Left 9/22/2016    Procedure: INJECT TRANSVERSUS ABDOMINIS PLANE (TAP) BLOCK BILATERAL;  Surgeon: Dickson Corrigan MD;  [facility]     laparoscopic pineda  1995     LAPAROSCOPIC HERNIORRHAPHY INCISIONAL N/A 8/23/2018    Procedure: LAPAROSCOPIC HERNIORRHAPHY INCISIONAL;  Laparoscopic Incisional Hernia Repair with Symbotex Mesh Implant;  Surgeon: Nestor Phoenix MD;  [facility]     LAPAROSCOPIC PANCREATECTOMY, TRANSPLANT AUTO ISLET CELL N/A 12/28/2016    Procedure: LAPAROSCOPIC PANCREATECTOMY, TRANSPLANT  AUTO ISLET CELL;  Surgeon: Nestor Phoenix MD;  Location: [facility]     transphenoidal pituitary resection  1990       ALLERGIES:  Allergies   Allergen Reactions     Apple Anaphylaxis     Depakote [Divalproex Sodium] Other (See Comments)     Chest pain     Zithromax [Azithromycin Dihydrate] Anaphylaxis     Noted in 4/7/08 ER     Darvocet [Propoxyphene N-Apap] Itching     Morphine Nausea and Vomiting and Rash     Nalbuphine Hcl Rash     RASH :nubaine     Zosyn [Piperacillin-Tazobactam In D5w] Rash     Possible allergy, did have a diffuse rash that seemed drug related but could have also been related to soap in the hospital.      Bactrim [Sulfamethoxazole W-Trimethoprim] Other (See Comments) and Nausea and Vomiting     Severely low liver function.     Cats      Compazine [Prochlorperazine] Other (See Comments)     Twitching. Takes Benedryl and is fine     Corticosteroids Other (See Comments)     All oral,IV and injectable steroids are contraindicated (unless in life threatening situations) in [facility] transplant recipients. They can cause irreversible loss of islet cell function. Please contact patients transplant care coordinator Mecca Watkins RN BSN @ [phone]/Pager [phone], and Endocrinologist prior to administration.     Dust Mites      Grass      Prednisone Other (See Comments)     Insomnia       Ragweeds      Tape [Adhesive Tape] Blisters     Tramadol      Trees      Zofran [Ondansetron] Other (See Comments)     migraine     Flagyl [Metronidazole] Hives and Rash       PERTINENT MEDICATIONS:    Current Outpatient Prescriptions:      acetaminophen 500 MG CAPS, Take 2 mg by mouth 2 times daily, Disp: , Rfl:      amitriptyline (ELAVIL) 10 MG tablet, , Disp: , Rfl: 0     amitriptyline (ELAVIL) 50 MG tablet, Take 1 tablet (50 mg) by mouth At Bedtime, Disp: 60 tablet, Rfl: 3     amylase-lipase-protease (CREON 24) 41448-58077 units CPEP per EC capsule, Take 3-4 capsules by mouth with meals and 1-2 with snacks.  Maximum 15 capsules per day., Disp: 450 capsule, Rfl: 11     aspirin 81 MG EC tablet, Take 1 tablet (81 mg) by mouth daily, Disp: 90 tablet, Rfl: 3     azelastine (ASTELIN) 0.1 % spray, Spray 1 spray into both nostrils 2 times daily, Disp: , Rfl:      blood glucose monitoring (PAT CONTOUR NEXT) test strip, Use to test blood sugar 6 times daily or as directed., Disp: 2 Box, Rfl: 11     blood glucose monitoring (PAT MICROLET) lancets, Use to test blood sugar 6-8 times daily or as directed., Disp: 1 Box, Rfl: prn     cetirizine (ZYRTEC) 10 MG tablet, Take 10 mg by mouth daily, Disp: , Rfl:      diphenhydrAMINE (BENADRYL ALLERGY) 25 MG capsule, Take 1 capsule (25 mg) by mouth every 6 hours as needed for itching or allergies, Disp: 56 capsule, Rfl: 0     docusate sodium (COLACE) 100 MG capsule, Take 1 capsule (100 mg) by mouth 2 times daily as needed for constipation,, Disp: 60 capsule, Rfl: 0     estradiol (VAGIFEM) 10 MCG TABS vaginal tablet, , Disp: , Rfl:      FOLIC ACID PO, Take 1 mg by mouth daily, Disp: , Rfl:      glucose 40 % GEL gel, Take 15-30 g by mouth every 15 minutes as needed for low blood sugar, Disp: 3 Tube, Rfl: 2     HYDROmorphone (DILAUDID) 2 MG tablet, Take 0.5-1 tablets (1-2 mg) by mouth every 3 hours as needed for moderate to severe pain (Patient not taking: Reported on 9/5/2018), Disp: 15 tablet, Rfl: 0     ibuprofen (ADVIL/MOTRIN) 400 MG tablet, Take 1 tablet (400 mg) by mouth every 6 hours as needed for moderate pain, Disp: 30 tablet, Rfl: 0     levothyroxine (SYNTHROID) 137 MCG tablet, Take 137 mcg by mouth daily, Disp: , Rfl:      LORazepam (ATIVAN) 1 MG tablet, Take 1 tablet (1 mg) by mouth every 6 hours as needed for anxiety (Patient not taking: Reported on 9/5/2018), Disp: 10 tablet, Rfl: 0     Lubiprostone (AMITIZA) 8 MCG CAPS capsule, Take 5 capsules (40 mcg) by mouth daily Take 3 capsules in AM and 2 capsules in PM, Disp: 150 capsule, Rfl: 3     multivitamin CF formula (CHOICEFUL)  capsule, Take 1 tablet by mouth daily, Disp: , Rfl: 11     omeprazole (PRILOSEC) 40 MG capsule, Take 1 capsule (40 mg) by mouth daily, Disp: 90 capsule, Rfl: 3     order for DME, Equipment being ordered:Cefaly, Disp: 1 Device, Rfl: 0     prochlorperazine (COMPAZINE) 5 MG tablet, Take two 5 mg tablets by mouth every six hours as needed for nausea., Disp: 90 tablet, Rfl: 3     ranitidine (ZANTAC) 150 MG tablet, Take 1 tablet (150 mg) by mouth At Bedtime, Disp: 90 tablet, Rfl: 3     senna-docusate (SENOKOT-S;PERICOLACE) 8.6-50 MG per tablet, Take 1-2 tablets by mouth 2 times daily, Disp: 100 tablet, Rfl: 0     Sharps Container (BD SHARPS ) MISC, 1 Container as needed, Disp: 1 each, Rfl: 1     ZOLMitriptan (ZOMIG) 5 MG tablet, Take 5 mg by mouth at onset of headache for migraine, Disp: , Rfl:     SOCIAL HISTORY:  Social History     Social History     Marital status:      Spouse name: Norris     Number of children: 3     Years of education: 16     Occupational History     director [employer]     Social History Main Topics     Smoking status: Former Smoker     Packs/day: 0.50     Years: 6.00     Types: Cigarettes     Start date: 9/1/1985     Quit date: 1/1/1992     Smokeless tobacco: Never Used      Comment: no 2nd hand     Alcohol use 1.8 - 3.6 oz/week     0 Standard drinks or equivalent, 1 - 2 Glasses of wine, 1 - 2 Cans of beer, 1 - 2 Shots of liquor per week      Comment: occasional     Drug use: No     Sexual activity: Yes     Partners: Male     Birth control/ protection: None      Comment: Hystectomy 1999     Other Topics Concern     Parent/Sibling W/ Cabg, Mi Or Angioplasty Before 65f 55m? No     Blood Transfusions No     Seat Belt Yes     Social History Narrative     with 3 children and a dog.  No smoking, etoh or drug use.  Worked as a  for [employer] in [employer] in the past.  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[place], but currently on [employer]  "HISTORY:  Family History   Problem Relation Age of Onset     Eye Disorder Father      cataract, detached retina     Myocardial Infarction Father 60     Lipids Father      Cerebrovascular Disease Father      Depression Father      Substance Abuse Father      Anesthesia Reaction Father      stroke right after surgery     Cataracts Father      Osteoarthritis Father      Ulcerative Colitis Father      Lipids Mother      Hypertension Mother      Thyroid Disease Mother      Depression Mother      Angina Mother      GERD Mother      Skin Cancer Mother      Eye Disorder Son      ptosis     Eye Disorder Paternal Grandmother      cataract     Eye Disorder Paternal Grandfather      cataract     Diabetes Paternal Grandfather      Eye Disorder Maternal Grandmother      cataract     Thyroid Disease Maternal Grandmother      Coronary Artery Disease Sister      angioplasty     GERD Sister      Substance Abuse Sister      Depression Sister      Depression Son      Anxiety Disorder Son      Thyroid Disease Sister      Diabetes Maternal Grandfather      Depression Nephew      Anxiety Disorder Nephew      Thyroid Disease Nephew      Type 2 Diabetes Cousin      paternal cousin     HEART DISEASE Paternal Aunt      Diabetes Paternal Aunt      Diabetes Paternal Uncle      HEART DISEASE Paternal Uncle        Past/family/social history reviewed and no changes    PHYSICAL EXAMINATION:  Constitutional: aaox3, cooperative, pleasant, not dyspneic/diaphoretic, no acute distress  Vitals reviewed: /68  Pulse 80  Temp 98.5  F (36.9  C) (Oral)  Ht 1.727 m (5' 8\")  Wt 69.6 kg (153 lb 6.4 oz)  SpO2 99%  BMI 23.32 kg/m2  Wt:   Wt Readings from Last 2 Encounters:   11/21/18 69.6 kg (153 lb 6.4 oz)   09/14/18 67.5 kg (148 lb 12.8 oz)      Eyes: Sclera anicteric/injected  Ears/nose/mouth/throat: Normal oropharynx without ulcers or exudate, mucus membranes moist, hearing intact  Neck: supple, thyroid normal size  CV: No edema  Respiratory: " Unlabored breathing  Skin: warm, perfused, no jaundice  Psych: Normal affect  MSK: Normal gait      PERTINENT STUDIES:  Orders Only on 09/13/2018   Component Date Value Ref Range Status     WBC 09/13/2018 5.8  4.0 - 11.0 10e9/L Final     RBC Count 09/13/2018 4.51  3.8 - 5.2 10e12/L Final     Hemoglobin 09/13/2018 14.1  11.7 - 15.7 g/dL Final     Hematocrit 09/13/2018 42.1  35.0 - 47.0 % Final     MCV 09/13/2018 93  78 - 100 fl Final     MCH 09/13/2018 31.3  26.5 - 33.0 pg Final     MCHC 09/13/2018 33.5  31.5 - 36.5 g/dL Final     RDW 09/13/2018 13.2  10.0 - 15.0 % Final     Platelet Count 09/13/2018 430  150 - 450 10e9/L Final     Diff Method 09/13/2018 Automated Method   Final     % Neutrophils 09/13/2018 37.1  % Final     % Lymphocytes 09/13/2018 40.5  % Final     % Monocytes 09/13/2018 10.5  % Final     % Eosinophils 09/13/2018 9.8  % Final     % Basophils 09/13/2018 2.1  % Final     % Immature Granulocytes 09/13/2018 0.0  % Final     Nucleated RBCs 09/13/2018 0  0 /100 Final     Absolute Neutrophil 09/13/2018 2.2  1.6 - 8.3 10e9/L Final     Absolute Lymphocytes 09/13/2018 2.4  0.8 - 5.3 10e9/L Final     Absolute Monocytes 09/13/2018 0.6  0.0 - 1.3 10e9/L Final     Absolute Eosinophils 09/13/2018 0.6  0.0 - 0.7 10e9/L Final     Absolute Basophils 09/13/2018 0.1  0.0 - 0.2 10e9/L Final     Abs Immature Granulocytes 09/13/2018 0.0  0 - 0.4 10e9/L Final     Absolute Nucleated RBC 09/13/2018 0.0   Final     Sodium 09/13/2018 138  133 - 144 mmol/L Final     Potassium 09/13/2018 4.3  3.4 - 5.3 mmol/L Final     Chloride 09/13/2018 102  94 - 109 mmol/L Final     Carbon Dioxide 09/13/2018 30  20 - 32 mmol/L Final     Anion Gap 09/13/2018 5  3 - 14 mmol/L Final     Glucose 09/13/2018 119* 70 - 99 mg/dL Final     Urea Nitrogen 09/13/2018 12  7 - 30 mg/dL Final     Creatinine 09/13/2018 0.83  0.52 - 1.04 mg/dL Final     GFR Estimate 09/13/2018 72  >60 mL/min/1.7m2 Final     GFR Estimate If Black 09/13/2018 87  >60  mL/min/1.7m2 Final     Calcium 09/13/2018 9.2  8.5 - 10.1 mg/dL Final     Bilirubin Direct 09/13/2018 0.2  0.0 - 0.2 mg/dL Final     Bilirubin Total 09/13/2018 0.8  0.2 - 1.3 mg/dL Final     Albumin 09/13/2018 3.9  3.4 - 5.0 g/dL Final     Protein Total 09/13/2018 7.8  6.8 - 8.8 g/dL Final     Alkaline Phosphatase 09/13/2018 145  40 - 150 U/L Final     ALT 09/13/2018 59* 0 - 50 U/L Final     AST 09/13/2018 39  0 - 45 U/L Final     IGG 09/13/2018 1160  695 - 1620 mg/dL Final     IgG1 09/13/2018 416  300 - 856 mg/dL Final     IgG2 09/13/2018 384  158 - 761 mg/dL Final     IgG3 09/13/2018 83  24 - 192 mg/dL Final     IgG4 09/13/2018 95* 11 - 86 mg/dL Final       Again, thank you for allowing me to participate in the care of your patient.      Sincerely,    Rosanne Marti PA-C

## 2018-11-29 ENCOUNTER — TELEPHONE (OUTPATIENT)
Dept: GASTROENTEROLOGY | Facility: CLINIC | Age: 53
End: 2018-11-29

## 2018-11-29 NOTE — TELEPHONE ENCOUNTER
LVM for patient in regards to message received from call center. Left call back number for patient to call and schedule appointment.

## 2018-11-29 NOTE — TELEPHONE ENCOUNTER
[facility] Call Center    Phone Message    May a detailed message be left on voicemail: yes    Reason for Call: Other: PT:  Pt would like to fletcher a follow up appt with Figueroa MAYER, please call pt to further assist thank you. Per pt's req. Please call pt's home phone.    Action Taken: Message routed to:  [facility] ([facility]): TANJA

## 2018-12-17 DIAGNOSIS — D89.84 IGG4-RELATED SCLEROSING DISEASE (H): ICD-10-CM

## 2018-12-17 LAB
ALBUMIN SERPL-MCNC: 3.8 G/DL (ref 3.4–5)
ALP SERPL-CCNC: 142 U/L (ref 40–150)
ALT SERPL W P-5'-P-CCNC: 60 U/L (ref 0–50)
ANION GAP SERPL CALCULATED.3IONS-SCNC: 6 MMOL/L (ref 3–14)
AST SERPL W P-5'-P-CCNC: 42 U/L (ref 0–45)
BASOPHILS # BLD AUTO: 0.1 10E9/L (ref 0–0.2)
BASOPHILS NFR BLD AUTO: 1.7 %
BILIRUB SERPL-MCNC: 0.6 MG/DL (ref 0.2–1.3)
BUN SERPL-MCNC: 9 MG/DL (ref 7–30)
CALCIUM SERPL-MCNC: 8.6 MG/DL (ref 8.5–10.1)
CHLORIDE SERPL-SCNC: 102 MMOL/L (ref 94–109)
CO2 SERPL-SCNC: 31 MMOL/L (ref 20–32)
CREAT SERPL-MCNC: 0.65 MG/DL (ref 0.52–1.04)
DIFFERENTIAL METHOD BLD: NORMAL
EOSINOPHIL # BLD AUTO: 0.4 10E9/L (ref 0–0.7)
EOSINOPHIL NFR BLD AUTO: 6.3 %
ERYTHROCYTE [DISTWIDTH] IN BLOOD BY AUTOMATED COUNT: 13.9 % (ref 10–15)
GFR SERPL CREATININE-BSD FRML MDRD: >90 ML/MIN/1.7M2
GLUCOSE SERPL-MCNC: 140 MG/DL (ref 70–99)
HCT VFR BLD AUTO: 40.6 % (ref 35–47)
HGB BLD-MCNC: 13.1 G/DL (ref 11.7–15.7)
IMM GRANULOCYTES # BLD: 0 10E9/L (ref 0–0.4)
IMM GRANULOCYTES NFR BLD: 0.2 %
LYMPHOCYTES # BLD AUTO: 2.5 10E9/L (ref 0.8–5.3)
LYMPHOCYTES NFR BLD AUTO: 42 %
MCH RBC QN AUTO: 30.4 PG (ref 26.5–33)
MCHC RBC AUTO-ENTMCNC: 32.3 G/DL (ref 31.5–36.5)
MCV RBC AUTO: 94 FL (ref 78–100)
MONOCYTES # BLD AUTO: 0.7 10E9/L (ref 0–1.3)
MONOCYTES NFR BLD AUTO: 11.3 %
NEUTROPHILS # BLD AUTO: 2.2 10E9/L (ref 1.6–8.3)
NEUTROPHILS NFR BLD AUTO: 38.5 %
NRBC # BLD AUTO: 0 10*3/UL
NRBC BLD AUTO-RTO: 0 /100
PLATELET # BLD AUTO: 377 10E9/L (ref 150–450)
POTASSIUM SERPL-SCNC: 3.5 MMOL/L (ref 3.4–5.3)
PROT SERPL-MCNC: 7.3 G/DL (ref 6.8–8.8)
RBC # BLD AUTO: 4.31 10E12/L (ref 3.8–5.2)
SODIUM SERPL-SCNC: 139 MMOL/L (ref 133–144)
WBC # BLD AUTO: 5.8 10E9/L (ref 4–11)

## 2018-12-17 NOTE — LETTER
Patient:  Dinora Mcghee  :   1965  MRN:     [id number]        Ms.Elaine Racquel Mcghee  [address] [place] [address]        2019    Dear ,    We are writing to inform you of your test results. Slight increase in IgG-4 level; otherwise stable labs.      Results for orders placed or performed in visit on 18   Immunoglobulin G subclasses   Result Value Ref Range    IGG 1,060 695 - 1,620 mg/dL    IgG1 458 300 - 856 mg/dL    IgG2 368 158 - 761 mg/dL    IgG3 72 24 - 192 mg/dL    IgG4 107 (H) 11 - 86 mg/dL   CBC with platelets differential   Result Value Ref Range    WBC 5.8 4.0 - 11.0 10e9/L    RBC Count 4.31 3.8 - 5.2 10e12/L    Hemoglobin 13.1 11.7 - 15.7 g/dL    Hematocrit 40.6 35.0 - 47.0 %    MCV 94 78 - 100 fl    MCH 30.4 26.5 - 33.0 pg    MCHC 32.3 31.5 - 36.5 g/dL    RDW 13.9 10.0 - 15.0 %    Platelet Count 377 150 - 450 10e9/L    Diff Method Automated Method     % Neutrophils 38.5 %    % Lymphocytes 42.0 %    % Monocytes 11.3 %    % Eosinophils 6.3 %    % Basophils 1.7 %    % Immature Granulocytes 0.2 %    Nucleated RBCs 0 0 /100    Absolute Neutrophil 2.2 1.6 - 8.3 10e9/L    Absolute Lymphocytes 2.5 0.8 - 5.3 10e9/L    Absolute Monocytes 0.7 0.0 - 1.3 10e9/L    Absolute Eosinophils 0.4 0.0 - 0.7 10e9/L    Absolute Basophils 0.1 0.0 - 0.2 10e9/L    Abs Immature Granulocytes 0.0 0 - 0.4 10e9/L    Absolute Nucleated RBC 0.0    Comprehensive metabolic panel   Result Value Ref Range    Sodium 139 133 - 144 mmol/L    Potassium 3.5 3.4 - 5.3 mmol/L    Chloride 102 94 - 109 mmol/L    Carbon Dioxide 31 20 - 32 mmol/L    Anion Gap 6 3 - 14 mmol/L    Glucose 140 (H) 70 - 99 mg/dL    Urea Nitrogen 9 7 - 30 mg/dL    Creatinine 0.65 0.52 - 1.04 mg/dL    GFR Estimate >90 >60 mL/min/1.7m2    GFR Estimate If Black >90 >60 mL/min/1.7m2    Calcium 8.6 8.5 - 10.1 mg/dL    Bilirubin Total 0.6 0.2 - 1.3 mg/dL    Albumin 3.8 3.4 - 5.0 g/dL    Protein Total 7.3 6.8 - 8.8 g/dL    Alkaline  Phosphatase 142 40 - 150 U/L    ALT 60 (H) 0 - 50 U/L    AST 42 0 - 45 U/L     *Note: Due to a large number of results and/or encounters for the requested time period, some results have not been displayed. A complete set of results can be found in Results Review.     Anita Becerra MD

## 2018-12-18 LAB
IGG SERPL-MCNC: 1060 MG/DL (ref 695–1620)
IGG1 SER-MCNC: 458 MG/DL (ref 300–856)
IGG2 SER-MCNC: 368 MG/DL (ref 158–761)
IGG3 SER-MCNC: 72 MG/DL (ref 24–192)
IGG4 SER-MCNC: 107 MG/DL (ref 11–86)

## 2018-12-19 DIAGNOSIS — K21.9 GASTROESOPHAGEAL REFLUX DISEASE, ESOPHAGITIS PRESENCE NOT SPECIFIED: ICD-10-CM

## 2019-01-14 DIAGNOSIS — E13.9 POST-PANCREATECTOMY DIABETES (H): ICD-10-CM

## 2019-01-14 DIAGNOSIS — K86.89 PANCREATIC INSUFFICIENCY: Primary | ICD-10-CM

## 2019-01-14 DIAGNOSIS — Z90.410 POST-PANCREATECTOMY DIABETES (H): ICD-10-CM

## 2019-01-14 DIAGNOSIS — E89.1 POST-PANCREATECTOMY DIABETES (H): ICD-10-CM

## 2019-01-18 ASSESSMENT — ENCOUNTER SYMPTOMS
BACK PAIN: 0
NAUSEA: 1
BOWEL INCONTINENCE: 0
BLOOD IN STOOL: 0
EYE REDNESS: 0
EYE WATERING: 0
TROUBLE SWALLOWING: 1
TASTE DISTURBANCE: 0
JOINT SWELLING: 0
MUSCLE CRAMPS: 1
DIARRHEA: 1
SINUS PAIN: 0
TASTE DISTURBANCE: 0
JAUNDICE: 0
DOUBLE VISION: 0
ARTHRALGIAS: 0
BOWEL INCONTINENCE: 0
NECK MASS: 0
NECK PAIN: 1
ARTHRALGIAS: 0
SINUS CONGESTION: 1
SORE THROAT: 0
SMELL DISTURBANCE: 0
DOUBLE VISION: 0
MUSCLE CRAMPS: 1
HEARTBURN: 0
EYE PAIN: 1
HEARTBURN: 0
BACK PAIN: 0
ABDOMINAL PAIN: 1
RECTAL PAIN: 0
JOINT SWELLING: 0
SINUS CONGESTION: 1
DIARRHEA: 1
NAUSEA: 1
VOMITING: 1
RECTAL PAIN: 0
EYE IRRITATION: 0
MUSCLE WEAKNESS: 0
MYALGIAS: 0
EYE IRRITATION: 0
BLOOD IN STOOL: 0
HOARSE VOICE: 0
SORE THROAT: 0
ABDOMINAL PAIN: 1
MUSCLE WEAKNESS: 0
NECK MASS: 0
BLOATING: 1
NECK PAIN: 1
STIFFNESS: 0
MYALGIAS: 0
JAUNDICE: 0
HOARSE VOICE: 0
EYE REDNESS: 0
BLOATING: 1
TROUBLE SWALLOWING: 1
SINUS PAIN: 0
CONSTIPATION: 1
EYE PAIN: 1
EYE WATERING: 0
STIFFNESS: 0
SMELL DISTURBANCE: 0
VOMITING: 1
CONSTIPATION: 1

## 2019-01-24 ENCOUNTER — TELEPHONE (OUTPATIENT)
Dept: GASTROENTEROLOGY | Facility: CLINIC | Age: 54
End: 2019-01-24

## 2019-01-24 DIAGNOSIS — K86.89 PANCREATIC INSUFFICIENCY: ICD-10-CM

## 2019-01-24 DIAGNOSIS — E13.9 POST-PANCREATECTOMY DIABETES (H): ICD-10-CM

## 2019-01-24 DIAGNOSIS — E89.1 POST-PANCREATECTOMY DIABETES (H): ICD-10-CM

## 2019-01-24 DIAGNOSIS — Z90.410 POST-PANCREATECTOMY DIABETES (H): ICD-10-CM

## 2019-01-24 LAB
ALBUMIN SERPL-MCNC: 3.4 G/DL (ref 3.4–5)
ALP SERPL-CCNC: 110 U/L (ref 40–150)
ALT SERPL W P-5'-P-CCNC: 48 U/L (ref 0–50)
ANION GAP SERPL CALCULATED.3IONS-SCNC: 9 MMOL/L (ref 3–14)
AST SERPL W P-5'-P-CCNC: 31 U/L (ref 0–45)
BASOPHILS # BLD AUTO: 0.1 10E9/L (ref 0–0.2)
BASOPHILS NFR BLD AUTO: 2 %
BILIRUB SERPL-MCNC: 0.5 MG/DL (ref 0.2–1.3)
BUN SERPL-MCNC: 17 MG/DL (ref 7–30)
CALCIUM SERPL-MCNC: 8.3 MG/DL (ref 8.5–10.1)
CHLORIDE SERPL-SCNC: 107 MMOL/L (ref 94–109)
CO2 SERPL-SCNC: 26 MMOL/L (ref 20–32)
CREAT SERPL-MCNC: 0.81 MG/DL (ref 0.52–1.04)
DIFFERENTIAL METHOD BLD: NORMAL
EOSINOPHIL # BLD AUTO: 0.4 10E9/L (ref 0–0.7)
EOSINOPHIL NFR BLD AUTO: 7 %
ERYTHROCYTE [DISTWIDTH] IN BLOOD BY AUTOMATED COUNT: 14.3 % (ref 10–15)
FERRITIN SERPL-MCNC: 17 NG/ML (ref 8–252)
GFR SERPL CREATININE-BSD FRML MDRD: 83 ML/MIN/{1.73_M2}
GLUCOSE SERPL-MCNC: 103 MG/DL (ref 70–99)
HBA1C MFR BLD: 6 % (ref 0–5.6)
HCT VFR BLD AUTO: 39 % (ref 35–47)
HGB BLD-MCNC: 12.4 G/DL (ref 11.7–15.7)
IRON SATN MFR SERPL: 24 % (ref 15–46)
IRON SERPL-MCNC: 61 UG/DL (ref 35–180)
LYMPHOCYTES # BLD AUTO: 2.1 10E9/L (ref 0.8–5.3)
LYMPHOCYTES NFR BLD AUTO: 38 %
MCH RBC QN AUTO: 30.5 PG (ref 26.5–33)
MCHC RBC AUTO-ENTMCNC: 31.8 G/DL (ref 31.5–36.5)
MCV RBC AUTO: 96 FL (ref 78–100)
MONOCYTES # BLD AUTO: 0.8 10E9/L (ref 0–1.3)
MONOCYTES NFR BLD AUTO: 15 %
NEUTROPHILS # BLD AUTO: 2.1 10E9/L (ref 1.6–8.3)
NEUTROPHILS NFR BLD AUTO: 38 %
PLATELET # BLD AUTO: 399 10E9/L (ref 150–450)
POTASSIUM SERPL-SCNC: 4 MMOL/L (ref 3.4–5.3)
PROT SERPL-MCNC: 6.6 G/DL (ref 6.8–8.8)
RBC # BLD AUTO: 4.07 10E12/L (ref 3.8–5.2)
SODIUM SERPL-SCNC: 141 MMOL/L (ref 133–144)
TIBC SERPL-MCNC: 260 UG/DL (ref 240–430)
VIT B12 SERPL-MCNC: 885 PG/ML (ref 193–986)
WBC # BLD AUTO: 5.5 10E9/L (ref 4–11)

## 2019-01-24 PROCEDURE — 82607 VITAMIN B-12: CPT | Performed by: PEDIATRICS

## 2019-01-24 PROCEDURE — 85014 HEMATOCRIT: CPT | Performed by: PEDIATRICS

## 2019-01-24 PROCEDURE — 83540 ASSAY OF IRON: CPT | Performed by: PEDIATRICS

## 2019-01-24 PROCEDURE — 84590 ASSAY OF VITAMIN A: CPT | Performed by: PEDIATRICS

## 2019-01-24 PROCEDURE — 82747 ASSAY OF FOLIC ACID RBC: CPT | Performed by: PEDIATRICS

## 2019-01-24 PROCEDURE — 82728 ASSAY OF FERRITIN: CPT | Performed by: PEDIATRICS

## 2019-01-24 PROCEDURE — 84630 ASSAY OF ZINC: CPT | Performed by: PEDIATRICS

## 2019-01-24 PROCEDURE — 84134 ASSAY OF PREALBUMIN: CPT | Performed by: PEDIATRICS

## 2019-01-24 PROCEDURE — 83036 HEMOGLOBIN GLYCOSYLATED A1C: CPT | Performed by: PEDIATRICS

## 2019-01-24 PROCEDURE — 85025 COMPLETE CBC W/AUTO DIFF WBC: CPT | Performed by: PEDIATRICS

## 2019-01-24 PROCEDURE — 83550 IRON BINDING TEST: CPT | Performed by: PEDIATRICS

## 2019-01-24 PROCEDURE — 82306 VITAMIN D 25 HYDROXY: CPT | Performed by: PEDIATRICS

## 2019-01-24 PROCEDURE — 84446 ASSAY OF VITAMIN E: CPT | Performed by: PEDIATRICS

## 2019-01-24 PROCEDURE — 80053 COMPREHEN METABOLIC PANEL: CPT | Performed by: PEDIATRICS

## 2019-01-25 DIAGNOSIS — K86.89 PANCREATIC INSUFFICIENCY: ICD-10-CM

## 2019-01-25 DIAGNOSIS — E89.1 POST-PANCREATECTOMY DIABETES (H): ICD-10-CM

## 2019-01-25 DIAGNOSIS — E13.9 POST-PANCREATECTOMY DIABETES (H): ICD-10-CM

## 2019-01-25 DIAGNOSIS — Z90.410 POST-PANCREATECTOMY DIABETES (H): ICD-10-CM

## 2019-01-25 LAB
C PEPTIDE SERPL-MCNC: 1 NG/ML (ref 0.9–6.9)
C PEPTIDE SERPL-MCNC: 2.7 NG/ML (ref 0.9–6.9)
C PEPTIDE SERPL-MCNC: 3.2 NG/ML (ref 0.9–6.9)
DEPRECATED CALCIDIOL+CALCIFEROL SERPL-MC: <38 UG/L (ref 20–75)
GLUCOSE SERPL-MCNC: 103 MG/DL (ref 70–99)
GLUCOSE SERPL-MCNC: 104 MG/DL (ref 70–99)
GLUCOSE SERPL-MCNC: 88 MG/DL (ref 70–99)
PREALB SERPL IA-MCNC: 17 MG/DL (ref 15–45)
VITAMIN D2 SERPL-MCNC: <5 UG/L
VITAMIN D3 SERPL-MCNC: 33 UG/L
ZINC SERPL-MCNC: 60 UG/DL (ref 60–120)

## 2019-01-25 PROCEDURE — 82947 ASSAY GLUCOSE BLOOD QUANT: CPT | Performed by: PEDIATRICS

## 2019-01-25 PROCEDURE — 82947 ASSAY GLUCOSE BLOOD QUANT: CPT | Mod: 91 | Performed by: PEDIATRICS

## 2019-01-25 PROCEDURE — 84681 ASSAY OF C-PEPTIDE: CPT | Performed by: PEDIATRICS

## 2019-01-27 LAB
A-TOCOPHEROL VIT E SERPL-MCNC: 10.1 MG/L (ref 5.5–18)
ANNOTATION COMMENT IMP: NORMAL
BETA+GAMMA TOCOPHEROL SERPL-MCNC: 0.6 MG/L (ref 0–6)
RETINYL PALMITATE SERPL-MCNC: <0.02 MG/L (ref 0–0.1)
VIT A SERPL-MCNC: 0.43 MG/L (ref 0.3–1.2)

## 2019-01-29 DIAGNOSIS — G89.4 CHRONIC PAIN SYNDROME: Primary | ICD-10-CM

## 2019-01-30 RX ORDER — AMITRIPTYLINE HYDROCHLORIDE 10 MG/1
TABLET ORAL
Qty: 150 TABLET | Refills: 0 | Status: SHIPPED | OUTPATIENT
Start: 2019-01-30 | End: 2019-02-01

## 2019-02-01 ENCOUNTER — OFFICE VISIT (OUTPATIENT)
Dept: GASTROENTEROLOGY | Facility: CLINIC | Age: 54
End: 2019-02-01
Payer: COMMERCIAL

## 2019-02-01 ENCOUNTER — OFFICE VISIT (OUTPATIENT)
Dept: NEUROLOGY | Facility: CLINIC | Age: 54
End: 2019-02-01
Payer: COMMERCIAL

## 2019-02-01 VITALS
WEIGHT: 153.9 LBS | SYSTOLIC BLOOD PRESSURE: 104 MMHG | BODY MASS INDEX: 23.33 KG/M2 | HEART RATE: 98 BPM | DIASTOLIC BLOOD PRESSURE: 65 MMHG | HEIGHT: 68 IN | OXYGEN SATURATION: 98 %

## 2019-02-01 VITALS
SYSTOLIC BLOOD PRESSURE: 104 MMHG | HEART RATE: 98 BPM | OXYGEN SATURATION: 98 % | HEIGHT: 68 IN | DIASTOLIC BLOOD PRESSURE: 65 MMHG | WEIGHT: 153.9 LBS | BODY MASS INDEX: 23.33 KG/M2

## 2019-02-01 DIAGNOSIS — R51.9 HEADACHE DISORDER: Primary | ICD-10-CM

## 2019-02-01 DIAGNOSIS — Z98.84 BARIATRIC SURGERY STATUS: ICD-10-CM

## 2019-02-01 DIAGNOSIS — R11.0 NAUSEA: ICD-10-CM

## 2019-02-01 DIAGNOSIS — G89.4 CHRONIC PAIN SYNDROME: ICD-10-CM

## 2019-02-01 DIAGNOSIS — Z91.09 SENSITIVITY TO SUNLIGHT: ICD-10-CM

## 2019-02-01 DIAGNOSIS — K86.1 OTHER CHRONIC PANCREATITIS (H): ICD-10-CM

## 2019-02-01 DIAGNOSIS — K59.00 CONSTIPATION, UNSPECIFIED CONSTIPATION TYPE: Primary | ICD-10-CM

## 2019-02-01 DIAGNOSIS — Z23 NEED FOR PROPHYLACTIC VACCINATION AND INOCULATION AGAINST INFLUENZA: ICD-10-CM

## 2019-02-01 RX ORDER — ZOLMITRIPTAN 5 MG/1
5 TABLET, FILM COATED ORAL
Qty: 9 TABLET | Refills: 3 | Status: SHIPPED | OUTPATIENT
Start: 2019-02-01 | End: 2019-09-17

## 2019-02-01 RX ORDER — AMITRIPTYLINE HYDROCHLORIDE 10 MG/1
40 TABLET ORAL AT BEDTIME
Qty: 120 TABLET | Refills: 11 | Status: SHIPPED | OUTPATIENT
Start: 2019-02-01 | End: 2019-09-17

## 2019-02-01 ASSESSMENT — PAIN SCALES - GENERAL
PAINLEVEL: NO PAIN (0)
PAINLEVEL: NO PAIN (0)

## 2019-02-01 ASSESSMENT — MIFFLIN-ST. JEOR
SCORE: 1356.59
SCORE: 1356.59

## 2019-02-01 NOTE — NURSING NOTE
Chief Complaint   Patient presents with     RECHECK     [facility] RETURN - ANNUAL FOLLOW UP     Preventive Care:    Eye Exam Screening: During our visit today, we discussed that it is recommended you receive eye exam screening. Please call or make an appointment with your primary care provider to discuss this with them. You may also call the [facility] scheduling line ([phone]) to set up an eye exam at one of the [facility].        Stanley Quinteros, EMT

## 2019-02-01 NOTE — LETTER
"2019       RE: Dinora Mcghee  [address] [place] [address]     Dear Colleague,    Thank you for referring your patient, Dinora Mcghee, to the [facility] NEUROLOGY at [facility]. Please see a copy of my visit note below.      Injectable Influenza Immunization Documentation    1.  Has the patient received the information for the injectable influenza vaccine? NO     2. Is the patient 6 months of age or older? YES     3. Does the patient have any of the following contraindications?      Severe allergy to eggs?  No  Egg Allergy Algorithm Link    Severe allergic reaction to previous influenza vaccines?  No    Severe allergy to latex?  No    History of Guillain-[place] syndrome?  No  Currently have a temperature greater than 100.4F?  Did not check      4. Severely egg allergic patients should have flu vaccine eligibility assessed by an MD, RN, or pharmacist, and those who received flu vaccine should be observed for 15 min by an MD, RN, Pharmacist, Medical Technician, or member of clinic staff.\": NO    5. Latex-allergic patients should be given latex-free influenza vaccine No. Please reference the Vaccine latex table to determine if your clinic's product is latex-containing.       Vaccination given by CJB    Service Date: 2019      Latasha Paul, CANDIDO   Primary Care Center    [address] [place] [address], [facility] [address]   [place] [address]      RE: Dinora Loo   MRN: [id number]   : 1965      Dear Ms. Paul:      This is in regard to followup on Dinora Loo.  The patient returned today with a chief complaint of headache, light sensitivity, and restless legs syndrome.      The patient said that her headaches seem to be much better controlled now.  She is taking amitriptyline and her highest dose was 60 mg.  She was able to decrease the dose to 40 mg and she feels her headaches have stabilized on that current prescription.  She denied any " significant problems taking the medication.  She does have some mild orthostatic complaints.  I have talked with her in the past about squeezing her hands and also her legs together.  She said that definitely helps and she has not any adin orthostasis.  She said that she averages now about 1 major headache every 2 weeks.  This is a marked decrease in her prior frequency.  They are not as intense.  She does not feel she has had a full blown headache for at least 4 months.  She still notes that she has visual aura.  She has what she described as sparkles.  She can have these for 10-20 minutes and sometimes they do repeat.  Generally, they herald the headache, although she can wake up with them.  She typically has been using 5 mg Zomig tablets up to 4 times per month.  She did review with me her other issue with restless legs syndrome.  I did go over with her her low ferritin level of 17 from 01/24/2019.  I pointed out to her that patients with restless legs should probably have iron replacement if the level is under 75.  She did review with me her previous history of chronic pancreatitis and her Vera-en-Y anastomosis.  I went over with her her various blood tests that have been done and I do not see a recent copper level, although her zinc level was checked and it was 60, which is normal.  Her last A1C hemoglobin was 6 and her vitamin E level was 10.1 and her E gamma level 0.9.  The patient's vitamin A level was 0.43 and her B12 level 885 picograms.  Her vitamin D3 level was 33.  The patient is going to review with you and her gastroenterologist whether she should have now further oral iron therapy or receive IV iron.  The patient did tell me that her restless legs will keep her up at night and sometimes she has poor sleep because of it.  She is aware that poor sleep can also trigger her headaches.  She reviewed with me too her chronic issues with sun sensitivity.  This has been an inciting factor for her headaches  in the past.  I had reviewed with her in the past FL-41 lenses.  She is interested in having prescription lenses and would like to see one of our ophthalmologists concerning her visual aura as well as sun sensitivity and explore the possibility of these lenses.  She denied any paresthesias nor any trouble controlling urine or stool.  She did not have any other complaints except for dry mouth.  She relates this to her medications including amitriptyline.  She is using Biotene and keeping her mouth moist with liquids.  She has not had any increase in cavities.      Neurologic examination revealed a pleasant woman.  Her blood pressure is 104/65 with a pulse of 98.  She had a regular cardiac rhythm without gallops or murmurs.  She did not have cervical bruits.  Muscle stretch reflexes were present and symmetric except for absent ankle jerks.  Her toes were downward going to plantar stimulation.  Strength testing was normal.  She had normal vibratory and position sense testing as well as cranial nerve examination.      IMPRESSION:   1.  Stable migraine.   2.  Sun sensitivity.   3.  Restless leg syndrome, probably related to chronically low iron stores.      I reviewed with the patient her different issues including her restless leg syndrome.  She is going to be reviewing with you and her gastroenterologist whether she should have further workup for iron deficiency and treatment.  The patient will be referred to one of our neuro-ophthalmologists.  I refilled her medication and she will be seen in this office now on a p.r.n. basis.  I have talked with her about the possibility of trying to taper amitriptyline further if she is headache free over the last year, although she has had headaches for many years and finds that the current dose of amitriptyline is quite helpful.      I have asked that she have followup copper level done if she has further blood tests soon.      Thank you again for allowing me to see this patient.   If you should have any questions, please feel free to contact me.        Sincerely yours,       Ruiz Canchola MD      I spent 30 minutes with the patient.  Over 50% of the time this involved counseling and coordination of care.       D: 2019   T: 2019   MT: AKA      Name:     MALINI DAVIS   MRN:      [id number]        Account:      [id number]   :      1965           Service Date: 2019      Document: [id number]

## 2019-02-01 NOTE — LETTER
"2/1/2019       RE: Dinora Mcghee  [address] [facility] [address]     Dear Colleague,    Thank you for referring your patient, Dinora Mcghee, to the [facility] [place] [facility] IBD CLINIC at [facility]. Please see a copy of my visit note below.    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     1.  Constipation, nausea, vomiting,   Patient reports that she continues to have formed bowel movements followed by 1 day a week in which she will have multiple loose and urgent bowel movements.  Symptoms have improved from last visit and she continues with and Amitiza 5 tablets a day and Colace.  She has been able to cut back on her senna and is not taking on a regular basis.  We discussed that symptoms of \"clearing out \"every couple of days may be due to constipation with overflow loose stools.  Will add magnesium oxide at a low dose of bowel regimen to see if this helps the patient have more satisfying bowel movements and reduces episodes of clearing out.  Upper GI symptoms of nausea most recently well controlled with Phenergan at night, and Compazine as needed. Patient should take anti-nausea medications sparingly due to increased risk of constipation. GERD is well managed with Zantac 150 daily and Prilosec 40 mg daily before meals which she can continue at this point. If her symptoms are controlled in the future, can consider decreasing Prilosec to 20 mg or taper completely.  -- start " magnesium oxide 200 mg daily   -- continue Amitiza, colace as you have been   -- continue zantac 150 mg daily, Prilosec 40 mg daily 3-60 minutes before eating  -- continue phenergan at night as needed for nausea  -- let us know if your symptoms change or worsen  -- can consider calcium supplementation (500 mg)- can discuss this again with primary care doctor     RTC 4-6 months     Thank you for this consultation. Patient discussed in brief with Dr. Genao. It was a pleasure to participate in the care of this patient; please contact us with any further questions.     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     Summarized from previous documentation with Dr. Genao, please see her notes (most recently 9/5/18) for additional details     Gastroparesis  Gastric emptying study with 2-hour study at [facility] have been consistent with gastroparesis, patient had follow-up study here 6/27/2016 with 49% remaining at 4 hours however the patient was not aware she was on narcotics at the time.  Patient had previous GJ tubes in fall 2016, NG tube used for venting with tube feeds and was able to wean off of tube feeds after TPA T.  At initial visit she reported severe migraine headaches associated with vomiting and is on amitriptyline for migraine prophylaxis.  Patient has used several medication for the symptoms including scopolamine patch, pro-chloro para Rufino, promethazine which helped symptoms.  She has also been seen by neurology and psychology  due to concern that she has a difficult time sleeping and with migraines.  At her last visit, patient had been working with dietition to increase caloric intake and was maintaining weight around 150 lbs. She reported occasional nausea without vomiting, with 1 episode in the last month. Symptoms may have been worsened due to anxiety regarding surgery and ativan had helped.  At the patient's last visit: the patient reports right that she continues to have dry heaving in the morning occurring after breakfast, occasionally after lunch, and oftentimes after dinner.  She is vomiting on average 1-2 times a week which can occur in the morning with waking up or in the evenings with medications.  She feels nauseated on average 3-5 days a week.  She has been conscientious about following diet including eating less raw vegetables, more cooked vegetables, 5-6 meals a day and limiting fat and sugar.  She notes that symptoms seem to be worse than last visit and worsening symptoms occurred in conjunction with worsening constipation.  She has been using scopolamine patches but has increased dry mouth and drowsiness with this medication.  She is also taking Compazine 5 times a week in addition to Phenergan at night about 4 times a week.  She is working on becoming more active and getting back into movement and exercise.     Today the patient reports that since taking the Phenergan at night, her nausea and vomiting has improved significantly and she is not waking up as often.  She does report an episode around the holidays in which he vomited 5-6 times however thinks this may be due to a GI bug.  Now she reports that she has nausea about 2 times a week depending on the week.  Is also taking Compazine as needed and no longer taking Zofran.  She also understands that blood sugar may play a role in her nausea therefore she will check her blood sugar when nauseous.    Constipation/irregular bowel movements/bloating  Patient reports  "history of ongoing constipation for over 20 years after having her first child.  She has tried multiple interventions including a bowel cleanout, MiraLAX, senna, milk of magnesium, Linzess, and Amitiza for which she was on when she first came to [facility] and  GI clinic.  Patient also takes creon.  Patient has done a course of rifaximin in the past with with improvement in bloating and stool consistency.  Patient has also been on specific SIBO/FODMAP diets to the [facility] while avoiding certain food triggers. At the patient's last visit: She had stopped nightly senna but notes continued abdomen discomfort which is more painful when she experiences stomach gurgling. Three weeks ago she became very constipated and vomited. She then completed a moviprep which she has done 3 times in the past year. She notes that last week, she ran out of amitiza and was off this medication for four days. When she did not have this, she said stools appeared greasy and loose and occurred up to 20 times a day. She then became constipated and did not have a bowel movement for three days. After she restarted amitiza, she did not have a bowel movement for three days which was associated with bloating, visible distension of her abdomen, and gas. Today she had a substantial bowel movement described as [place] Scale 4. Patient had also continues senna 1-2 times a week and continued colace BID, and took one bisacodyl pill last night. She notes occasional bright red blood per rectum but states this is rare.     Today the patient notes that she continues to have occasional watery stools which she describes occurs on \"clearing days \".  This will happen every couple of days in which she describes urgent bowel movements with a large amount of stool requiring multiple trips to the bathroom.  When this occurs, she feels as though it has drained her energy.  Will also have abdominal discomfort associated with this which is slightly alleviated with a " heating pad.  On other days, she will have a more solid bowel movement which she describes as a coil.  She plans to reschedule around having a bowel movement and her daily routine that involves exercise, drinking hot water, and guided imagery in order to have 1-2 satisfying bowel movements.  Continues to follow low FODMAP diet.  She continues to take amities of 5 tablets a day, occasional senna, and daily Colace. On average, the patient will take 12-15 creon tablets a day.     GERD, Dysphagia   Summarized/taken from prior documentation   Patient experiences GERD as substernal and throat burning with nocturnal relaxant causing choking. She had previously followed with Dr. George Flores in out clinic and reports a prior Schatzki's ring requiring previous dilation. Manometry was noral, and pH impedence had a DeMeester of 24 with positive symptoms association. Patient has treated GERD symptoms with BID PPI, Carafate, H2 blocker, and tums. At her last visit, she had been experiencing heartburn and has restarted omeprazole 40 mg daily with continued symptoms with specific food triggers. At the patient's last visit, she was instructed to start zantac 300 mg a day. Today she notes that since adding zantac, she has no breakthrough symptoms.     ROS:    No fevers or chills  No weight loss, + biking   No blurry vision, double vision or change in vision + dry eyes   No sore throat  No lymphadenopathy  + headache,   paraesthesias, or weakness in a limb  No shortness of breath or wheezing  No chest pain or pressure  No arthralgias or myalgias  No rashes or skin changes  No odynophagia or dysphagia  No BRBPR, hematochezia, melena  No dysuria, frequency or urgency  No hot/cold intolerance or polyria  No anxiety or depression    PROBLEM LIST  Patient Active Problem List    Diagnosis Date Noted     Headache, chronic migraine without aura 09/18/2018     Priority: Medium     Chronic pain syndrome 09/18/2018     Priority: Medium     S/P  hernia repair 08/23/2018     Priority: Medium     Incisional hernia, without obstruction or gangrene 05/20/2018     Priority: Medium     Adhesive capsulitis of shoulder, unspecified laterality 11/14/2017     Priority: Medium     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  Document previously scanned on 1/12/17.  Validation form completed and scanned.  Code Status reflects choices in most recent ACP document..  Confirmed/documented designated decision maker(s).  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 Provider to review and confirm.       GERD (gastroesophageal reflux disease) 12/01/2010     Priority: Medium     Lumbago 04/18/2005     Priority: Medium     History of L5-S1 degenerative disk disease.         Sprain and strain of other specified sites of hip and thigh 12/09/2002     Priority: Medium     Chondromalacia of patella 12/09/2002     Priority: Medium     Need for prophylactic immunotherapy      Priority: Medium     trees, grass, srw, dust mites, cat       Allergic rhinitis due to other allergen 12/21/2001     Priority: Medium     Hypothyroidism      Priority: Medium     Problem list name updated by automated process. Provider to review       Sensorineural hearing loss 01/10/2005     Priority: Medium     Problem list name updated by automated  process. Provider to review       Vertiginous syndrome and labyrinthine disorder 01/10/2005     Priority: Medium     Problem list name updated by automated process. Provider to review         PERTINENT PAST MEDICAL HISTORY:  Past Medical History:   Diagnosis Date     Allergic rhinitis, cause unspecified      Allergy to other foods     strawberries, apples, celeries, alice, watermelon     Arthritis     left knee     Choledocholithiasis     long after cholecystectomy     Chronic abdominal pain      Chronic constipation      Chronic nausea      Chronic pancreatitis (H)      Degeneration of lumbar or lumbosacral intervertebral disc      Esophageal reflux     w/ hiatal hernia     Gastroparesis      Hiatal hernia      History of pituitary adenoma     s/p resection     Hypothyroidism      Migraines      Mild hyperlipidemia      On tube feeding diet     presence of GJ tube     Pancreatic disease     PD stricture, 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 endometriosis growth     APPENDECTOMY       C  DELIVERY ONLY       C  DELIVERY ONLY      repeat c section with incidental cystotomy with repair     C EXCIS PITUITARY,TRANSNASAL/SEPTAL      pituitary tumor removed for diabetes insipidus     C TOTAL ABDOM HYSTERECTOMY      w/ bilateral salpingoophorectomy      C WRIST ARTHROSCOP,RELEASE XVERS LIG Bilateral 08      SECTION       COLONOSCOPY       ENDOSCOPIC RETROGRADE CHOLANGIOPANCREATOGRAM N/A 2015    Procedure: ENDOSCOPIC RETROGRADE CHOLANGIOPANCREATOGRAM;  Surgeon: Mandeep Park MD;  Location: [facility]     ENDOSCOPIC RETROGRADE CHOLANGIOPANCREATOGRAM N/A 2016    Procedure: COMBINED ENDOSCOPIC  RETROGRADE CHOLANGIOPANCREATOGRAPHY, PLACE TUBE/STENT;  Surgeon: Mandeep Park MD;  Location: [facility]     ENDOSCOPIC RETROGRADE CHOLANGIOPANCREATOGRAM N/A 3/17/2016    Procedure: COMBINED ENDOSCOPIC RETROGRADE CHOLANGIOPANCREATOGRAPHY, REMOVE FOREIGN BODY OR STENT/TUBE;  Surgeon: Mandeep Park MD;  Location: [facility]     ENDOSCOPIC RETROGRADE CHOLANGIOPANCREATOGRAM N/A 8/2/2016    Procedure: COMBINED ENDOSCOPIC RETROGRADE CHOLANGIOPANCREATOGRAPHY, PLACE TUBE/STENT;  Surgeon: Mandeep Park MD;  Location: [facility]     ENDOSCOPIC RETROGRADE CHOLANGIOPANCREATOGRAM N/A 8/26/2016    Procedure: COMBINED ENDOSCOPIC RETROGRADE CHOLANGIOPANCREATOGRAPHY, REMOVE FOREIGN BODY OR STENT/TUBE;  Surgeon: Mandeep Park MD;  Location: [facility]     ENDOSCOPIC ULTRASOUND UPPER GASTROINTESTINAL TRACT (GI) N/A 10/3/2016    Procedure: ENDOSCOPIC ULTRASOUND, ESOPHAGOSCOPY / UPPER GASTROINTESTINAL TRACT (GI);  Surgeon: Guru Jose Rodas MD;  Location: [facility]     ESOPHAGOSCOPY, GASTROSCOPY, DUODENOSCOPY (EGD), COMBINED N/A 6/24/2015    Procedure: COMBINED ESOPHAGOSCOPY, GASTROSCOPY, DUODENOSCOPY (EGD), REMOVE FOREIGN BODY;  Surgeon: Mandeep Park MD;  [facility]: [facility]     ESOPHAGOSCOPY, GASTROSCOPY, DUODENOSCOPY (EGD), COMBINED N/A 10/25/2015    Procedure: COMBINED ESOPHAGOSCOPY, GASTROSCOPY, DUODENOSCOPY (EGD);  Surgeon: Sammy Amaro MD;  [facility]     ESOPHAGOSCOPY, GASTROSCOPY, DUODENOSCOPY (EGD), COMBINED N/A 10/25/2015    Procedure: COMBINED ESOPHAGOSCOPY, GASTROSCOPY, DUODENOSCOPY (EGD), BIOPSY SINGLE OR MULTIPLE;  Surgeon: Sammy Amaro MD;  Location: [facility]     ESOPHAGOSCOPY, GASTROSCOPY, DUODENOSCOPY (EGD), DILATATION, COMBINED       EXCISE LESION TRUNK N/A 4/17/2017    Procedure: EXCISE LESION TRUNK;  Removal of Abdominal Foreign Body;  Surgeon: Nestor Phoenix MD;  [facility]     HC ESOPH/GAS REFLUX TEST W NASAL IMPED >1 HR N/A 11/19/2015    Procedure:  ESOPHAGEAL IMPEDENCE FUNCTION TEST WITH 24 HOUR PH GREATER THAN 1 HOUR;  Surgeon: Thiago Apple MD;  Location: [facility] GI     HC UGI ENDOSCOPY DIAG W BIOPSY  9/17/08     HC UGI ENDOSCOPY DIAG W BIOPSY  9/27/12     HC UGI ENDOSCOPY W ESOPHAGEAL DILATION BALLOON <30MM  9/17/08     HC UGI ENDOSCOPY W EUS N/A 5/5/2015    Procedure: COMBINED ENDOSCOPIC ULTRASOUND, ESOPHAGOSCOPY, GASTROSCOPY, DUODENOSCOPY (EGD);  Surgeon: Wm Dueñas MD;  Location: [facility]     INJECT TRANSVERSUS ABDOMINIS PLANE (TAP) BLOCK BILATERAL Left 9/22/2016    Procedure: INJECT TRANSVERSUS ABDOMINIS PLANE (TAP) BLOCK BILATERAL;  Surgeon: Dickson Corrigan MD;  Location: [facility]     laparoscopic pineda  1995     LAPAROSCOPIC HERNIORRHAPHY INCISIONAL N/A 8/23/2018    Procedure: LAPAROSCOPIC HERNIORRHAPHY INCISIONAL;  Laparoscopic Incisional Hernia Repair with Symbotex Mesh Implant;  Surgeon: Nestor Phoenix MD;  Location: [facility]     LAPAROSCOPIC PANCREATECTOMY, TRANSPLANT AUTO ISLET CELL N/A 12/28/2016    Procedure: LAPAROSCOPIC PANCREATECTOMY, TRANSPLANT AUTO ISLET CELL;  Surgeon: Nestor Phoenix MD;  Location: [facility]     transphenoidal pituitary resection  1990       ALLERGIES:  Allergies   Allergen Reactions     Apple Anaphylaxis     Depakote [Divalproex Sodium] Other (See Comments)     Chest pain     Zithromax [Azithromycin Dihydrate] Anaphylaxis     Noted in 4/7/08 ER     Darvocet [Propoxyphene N-Apap] Itching     Morphine Nausea and Vomiting and Rash     Nalbuphine Hcl Rash     RASH :nubaine     Zosyn [Piperacillin-Tazobactam In D5w] Rash     Possible allergy, did have a diffuse rash that seemed drug related but could have also been related to soap in the hospital.      Bactrim [Sulfamethoxazole W-Trimethoprim] Other (See Comments) and Nausea and Vomiting     Severely low liver function.     Cats      Compazine [Prochlorperazine] Other (See Comments)     Twitching. Takes Benedryl and is fine     Corticosteroids Other (See Comments)     All  oral,IV and injectable steroids are contraindicated (unless in life threatening situations) in [facility] transplant recipients. They can cause irreversible loss of islet cell function. Please contact patients transplant care coordinator Mecca BATRESN @ [phone]/Pager [phone], and Endocrinologist prior to administration.     Dust Mites      Grass      Prednisone Other (See Comments)     Insomnia       Ragweeds      Tape [Adhesive Tape] Blisters     Tramadol      Trees      Zofran [Ondansetron] Other (See Comments)     migraine     Flagyl [Metronidazole] Hives and Rash       PERTINENT MEDICATIONS:    Current Outpatient Medications:      acetaminophen 500 MG CAPS, Take 2 mg by mouth 2 times daily, Disp: , Rfl:      amitriptyline (ELAVIL) 50 MG tablet, Take 1 tablet (50 mg) by mouth At Bedtime (Patient taking differently: Take 40 mg by mouth At Bedtime ), Disp: 60 tablet, Rfl: 3     amylase-lipase-protease (CREON 24) 73965-64875 units CPEP per EC capsule, Take 3-4 capsules by mouth with meals and 1-2 with snacks. Maximum 15 capsules per day., Disp: 450 capsule, Rfl: 11     aspirin 81 MG EC tablet, Take 1 tablet (81 mg) by mouth daily, Disp: 90 tablet, Rfl: 3     azelastine (ASTELIN) 0.1 % spray, Spray 1 spray into both nostrils 2 times daily, Disp: , Rfl:      blood glucose monitoring (PAT CONTOUR NEXT) test strip, Use to test blood sugar 6 times daily or as directed., Disp: 2 Box, Rfl: 11     blood glucose monitoring (PAT MICROLET) lancets, Use to test blood sugar 6-8 times daily or as directed., Disp: 1 Box, Rfl: prn     cetirizine (ZYRTEC) 10 MG tablet, Take 10 mg by mouth daily, Disp: , Rfl:      diphenhydrAMINE (BENADRYL ALLERGY) 25 MG capsule, Take 1 capsule (25 mg) by mouth every 6 hours as needed for itching or allergies, Disp: 56 capsule, Rfl: 0     docusate sodium (COLACE) 100 MG capsule, Take 1 capsule (100 mg) by mouth 2 times daily as needed for constipation,, Disp: 60 capsule, Rfl:  0     estradiol (VAGIFEM) 10 MCG TABS vaginal tablet, , Disp: , Rfl:      FOLIC ACID PO, Take 1 mg by mouth daily, Disp: , Rfl:      glucose 40 % GEL gel, Take 15-30 g by mouth every 15 minutes as needed for low blood sugar, Disp: 3 Tube, Rfl: 2     ibuprofen (ADVIL/MOTRIN) 400 MG tablet, Take 1 tablet (400 mg) by mouth every 6 hours as needed for moderate pain, Disp: 30 tablet, Rfl: 0     levothyroxine (SYNTHROID) 137 MCG tablet, Take 137 mcg by mouth daily, Disp: , Rfl:      LORazepam (ATIVAN) 1 MG tablet, Take 1 tablet (1 mg) by mouth every 6 hours as needed for anxiety, Disp: 10 tablet, Rfl: 0     Lubiprostone (AMITIZA) 8 MCG CAPS capsule, Take 5 capsules (40 mcg) by mouth daily Take 3 capsules in AM and 2 capsules in PM, Disp: 150 capsule, Rfl: 3     lubiprostone (AMITIZA) 8 MCG capsule, Take 5 capsules (40 mcg) by mouth daily Take 3 capsules in AM and 2 capsules in PM, Disp: 150 capsule, Rfl: 3     multivitamin CF formula (CHOICEFUL) capsule, Take 1 tablet by mouth daily, Disp: , Rfl: 11     omeprazole (PRILOSEC) 40 MG capsule, Take 1 capsule (40 mg) by mouth daily, Disp: 90 capsule, Rfl: 3     order for DME, Equipment being ordered:Cefaly, Disp: 1 Device, Rfl: 0     prochlorperazine (COMPAZINE) 5 MG tablet, Take two 5 mg tablets by mouth every six hours as needed for nausea., Disp: 90 tablet, Rfl: 3     promethazine (PHENERGAN) 25 MG tablet, Take 1 tablet (25 mg) by mouth At Bedtime, Disp: 60 tablet, Rfl: 3     ranitidine (ZANTAC) 150 MG tablet, Take 2 tablets (300 mg) by mouth daily, Disp: 180 tablet, Rfl: 3     senna-docusate (SENOKOT-S;PERICOLACE) 8.6-50 MG per tablet, Take 1-2 tablets by mouth 2 times daily, Disp: 100 tablet, Rfl: 0     Sharps Container (BD SHARPS ) MISC, 1 Container as needed, Disp: 1 each, Rfl: 1     ZOLMitriptan (ZOMIG) 5 MG tablet, Take 5 mg by mouth at onset of headache for migraine, Disp: , Rfl:      amitriptyline (ELAVIL) 10 MG tablet, TAKE 5 TABLETS BY MOUTH EVERY NIGHT  AT BEDTIME (Patient not taking: Reported on 2019), Disp: 150 tablet, Rfl: 0     HYDROmorphone (DILAUDID) 2 MG tablet, Take 0.5-1 tablets (1-2 mg) by mouth every 3 hours as needed for moderate to severe pain (Patient not taking: Reported on 2018), Disp: 15 tablet, Rfl: 0     ranitidine (ZANTAC) 150 MG tablet, Take 1 tablet (150 mg) by mouth At Bedtime (Patient not taking: Reported on 2018), Disp: 90 tablet, Rfl: 3    SOCIAL HISTORY:  Social History     Socioeconomic History     Marital status:      Spouse name: Norris     Number of children: 3     Years of education: 16     Highest education level: Not on file   Social Needs     Financial resource strain: Not on file     Food insecurity - worry: Not on file     Food insecurity - inability: Not on file     Transportation needs - medical: Not on file     Transportation needs - non-medical: Not on file   Occupational History     Occupation: director     Employer: [employer]   Tobacco Use     Smoking status: Former Smoker     Packs/day: 0.50     Years: 6.00     Pack years: 3.00     Types: Cigarettes     Start date: 1985     Last attempt to quit: 1992     Years since quittin.1     Smokeless tobacco: Never Used     Tobacco comment: no 2nd hand   Substance and Sexual Activity     Alcohol use: Yes     Alcohol/week: 1.8 - 3.6 oz     Types: 1 - 2 Glasses of wine, 1 - 2 Cans of beer, 1 - 2 Shots of liquor per week     Comment: occasional     Drug use: No     Sexual activity: Yes     Partners: Male     Birth control/protection: None     Comment: Hystectomy    Other Topics Concern     Parent/sibling w/ CABG, MI or angioplasty before 65F 55M? No      Service Not Asked     Blood Transfusions No     Caffeine Concern Not Asked     Occupational Exposure Not Asked     Hobby Hazards Not Asked     Sleep Concern Not Asked     Stress Concern Not Asked     Weight Concern Not Asked     Special Diet Not Asked     Back Care Not Asked     Exercise Not  "Asked     Bike Helmet Not Asked     Seat Belt Yes     Self-Exams Not Asked   Social History Narrative     with 3 children and a dog.  No smoking, etoh or drug use.  Worked as a  for [employer] in [place] in the past.  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       FAMILY HISTORY:  Family History   Problem Relation Age of Onset     Eye Disorder Father         cataract, detached retina     Myocardial Infarction Father 60     Lipids Father      Cerebrovascular Disease Father      Depression Father      Substance Abuse Father      Anesthesia Reaction Father         stroke right after surgery     Cataracts Father      Osteoarthritis Father      Ulcerative Colitis Father      Lipids Mother      Hypertension Mother      Thyroid Disease Mother      Depression Mother      Angina Mother      GERD Mother      Skin Cancer Mother      Eye Disorder Son         ptosis     Eye Disorder Paternal Grandmother         cataract     Eye Disorder Paternal Grandfather         cataract     Diabetes Paternal Grandfather      Eye Disorder Maternal Grandmother         cataract     Thyroid Disease Maternal Grandmother      Coronary Artery Disease Sister         angioplasty     GERD Sister      Substance Abuse Sister      Depression Sister      Depression Son      Anxiety Disorder Son      Thyroid Disease Sister      Diabetes Maternal Grandfather      Depression Nephew      Anxiety Disorder Nephew      Thyroid Disease Nephew      Diabetes Type 2  Cousin         paternal cousin     Heart Disease Paternal Aunt      Diabetes Paternal Aunt      Diabetes Paternal Uncle      Heart Disease Paternal Uncle      Past/family/social history reviewed and no changes    PHYSICAL EXAMINATION:  Constitutional: aaox3, cooperative, pleasant, not dyspneic/diaphoretic, no acute distress  Vitals reviewed: /65   Pulse 98   Ht 1.727 m (5' 8\")   Wt 69.8 kg (153 lb 14.4 oz)   SpO2 98%   BMI 23.40 kg/m  "    Wt:   Wt Readings from Last 2 Encounters:   02/01/19 69.8 kg (153 lb 14.4 oz)   11/21/18 69.6 kg (153 lb 6.4 oz)      Eyes: Sclera anicteric/injected  Ears/nose/mouth/throat: Normal oropharynx without ulcers or exudate, mucus membranes moist, hearing intact  Neck: supple, thyroid normal size  CV: No edema  Respiratory: Unlabored breathing  Abdominal: soft non-tender to palpation, no hepatosplenomegaly   Skin: warm, perfused, no jaundice  Psych: Normal affect  MSK: Normal gait      PERTINENT STUDIES:  Orders Only on 09/13/2018   Component Date Value Ref Range Status     WBC 09/13/2018 5.8  4.0 - 11.0 10e9/L Final     RBC Count 09/13/2018 4.51  3.8 - 5.2 10e12/L Final     Hemoglobin 09/13/2018 14.1  11.7 - 15.7 g/dL Final     Hematocrit 09/13/2018 42.1  35.0 - 47.0 % Final     MCV 09/13/2018 93  78 - 100 fl Final     MCH 09/13/2018 31.3  26.5 - 33.0 pg Final     MCHC 09/13/2018 33.5  31.5 - 36.5 g/dL Final     RDW 09/13/2018 13.2  10.0 - 15.0 % Final     Platelet Count 09/13/2018 430  150 - 450 10e9/L Final     Diff Method 09/13/2018 Automated Method   Final     % Neutrophils 09/13/2018 37.1  % Final     % Lymphocytes 09/13/2018 40.5  % Final     % Monocytes 09/13/2018 10.5  % Final     % Eosinophils 09/13/2018 9.8  % Final     % Basophils 09/13/2018 2.1  % Final     % Immature Granulocytes 09/13/2018 0.0  % Final     Nucleated RBCs 09/13/2018 0  0 /100 Final     Absolute Neutrophil 09/13/2018 2.2  1.6 - 8.3 10e9/L Final     Absolute Lymphocytes 09/13/2018 2.4  0.8 - 5.3 10e9/L Final     Absolute Monocytes 09/13/2018 0.6  0.0 - 1.3 10e9/L Final     Absolute Eosinophils 09/13/2018 0.6  0.0 - 0.7 10e9/L Final     Absolute Basophils 09/13/2018 0.1  0.0 - 0.2 10e9/L Final     Abs Immature Granulocytes 09/13/2018 0.0  0 - 0.4 10e9/L Final     Absolute Nucleated RBC 09/13/2018 0.0   Final     Sodium 09/13/2018 138  133 - 144 mmol/L Final     Potassium 09/13/2018 4.3  3.4 - 5.3 mmol/L Final     Chloride 09/13/2018 102  94  - 109 mmol/L Final     Carbon Dioxide 09/13/2018 30  20 - 32 mmol/L Final     Anion Gap 09/13/2018 5  3 - 14 mmol/L Final     Glucose 09/13/2018 119* 70 - 99 mg/dL Final     Urea Nitrogen 09/13/2018 12  7 - 30 mg/dL Final     Creatinine 09/13/2018 0.83  0.52 - 1.04 mg/dL Final     GFR Estimate 09/13/2018 72  >60 mL/min/1.7m2 Final     GFR Estimate If Black 09/13/2018 87  >60 mL/min/1.7m2 Final     Calcium 09/13/2018 9.2  8.5 - 10.1 mg/dL Final     Bilirubin Direct 09/13/2018 0.2  0.0 - 0.2 mg/dL Final     Bilirubin Total 09/13/2018 0.8  0.2 - 1.3 mg/dL Final     Albumin 09/13/2018 3.9  3.4 - 5.0 g/dL Final     Protein Total 09/13/2018 7.8  6.8 - 8.8 g/dL Final     Alkaline Phosphatase 09/13/2018 145  40 - 150 U/L Final     ALT 09/13/2018 59* 0 - 50 U/L Final     AST 09/13/2018 39  0 - 45 U/L Final     IGG 09/13/2018 1160  695 - 1620 mg/dL Final     IgG1 09/13/2018 416  300 - 856 mg/dL Final     IgG2 09/13/2018 384  158 - 761 mg/dL Final     IgG3 09/13/2018 83  24 - 192 mg/dL Final     IgG4 09/13/2018 95* 11 - 86 mg/dL Final     Rosanne Marti PA-C

## 2019-02-01 NOTE — PATIENT INSTRUCTIONS
It was a pleasure taking care of you today.  I've included a brief summary of our discussion and care plan from today's visit below.  Please review this information with your primary care provider.  ______________________________________________________________________    My recommendations are summarized as follows:    -- start magnesium oxide 200 mg daily     -- continue Amitiza, colace as you have been     -- continue zantac 150 mg daily, Prilosec 40 mg daily 3-60 minutes before eating    -- continue phenergan at night as needed for nausea    -- let us know if your symptoms change or worsen    -- can consider calcium supplementation (500 mg)- can discuss this again with primary care doctor     Return to GI Clinic in 4-6 months to review your progress.    ______________________________________________________________________    Who do I call with any questions after my visit?  Please be in touch if there are any further questions that arise following today's visit.  There are multiple ways to contact your gastroenterology care team.        During business hours, you may reach a Gastroenterology nurse at [phone]      To schedule or reschedule an appointment, please call [phone].       You can always send a secure message through [email or web address]  [email or web address] messages are answered by your nurse or doctor typically within 24 hours.  Please allow extra time on weekends and holidays.        For urgent/emergent questions after business hours, you may reach the on-call GI Fellow by contacting the [facility] at [phone].     How will I get the results of any tests ordered?    You will receive all of your results.  If you have signed up for [email or web address], any tests ordered at your visit will be available to you after your physician reviews them.  Typically this takes 1-2 weeks.  If there are urgent results that require a change in your care plan, your physician or nurse will call you to discuss the next  steps.      What is [employer]?  [employer] is a secure way for you to access all of your healthcare records from the [facility].  It is a web based computer program, so you can sign on to it from any location.  It also allows you to send secure messages to your care team.  I recommend signing up for [employer] access if you have not already done so and are comfortable with using a computer.      How to I schedule a follow-up visit?  If you did not schedule a follow-up visit today, please call [phone] to schedule a follow-up office visit.        Sincerely,    Rosanne Marti PA-C  Division of Gastroenterology, Hepatology & Nutrition  [facility]

## 2019-02-01 NOTE — NURSING NOTE
"Chief Complaint   Patient presents with     RECHECK     Return patient, follow up       Vitals:    02/01/19 0851   BP: 104/65   Pulse: 98   SpO2: 98%   Weight: 69.8 kg (153 lb 14.4 oz)   Height: 1.727 m (5' 8\")       Body mass index is 23.4 kg/m .  BLANCA Verdugo                          "

## 2019-02-01 NOTE — PROGRESS NOTES
"  Injectable Influenza Immunization Documentation    1.  Has the patient received the information for the injectable influenza vaccine? NO     2. Is the patient 6 months of age or older? YES     3. Does the patient have any of the following contraindications?      Severe allergy to eggs?  No  Egg Allergy Algorithm Link    Severe allergic reaction to previous influenza vaccines?  No    Severe allergy to latex?  No    History of Guillain-[place] syndrome?  No  Currently have a temperature greater than 100.4F?  Did not check      4. Severely egg allergic patients should have flu vaccine eligibility assessed by an MD, RN, or pharmacist, and those who received flu vaccine should be observed for 15 min by an MD, RN, Pharmacist, Medical Technician, or member of clinic staff.\": NO    5. Latex-allergic patients should be given latex-free influenza vaccine No. Please reference the Vaccine latex table to determine if your clinic's product is latex-containing.       Vaccination given by CJB  "

## 2019-02-01 NOTE — PROGRESS NOTES
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     1.  Constipation, nausea, vomiting,   Patient reports that she continues to have formed bowel movements followed by 1 day a week in which she will have multiple loose and urgent bowel movements.  Symptoms have improved from last visit and she continues with and Amitiza 5 tablets a day and Colace.  She has been able to cut back on her senna and is not taking on a regular basis.  We discussed that symptoms of \"clearing out \"every couple of days may be due to constipation with overflow loose stools.  Will add magnesium oxide at a low dose of bowel regimen to see if this helps the patient have more satisfying bowel movements and reduces episodes of clearing out.  Upper GI symptoms of nausea most recently well controlled with Phenergan at night, and Compazine as needed. Patient should take anti-nausea medications sparingly due to increased risk of constipation. GERD is well managed with Zantac 150 daily and Prilosec 40 mg daily before meals which she can continue at this point. If her symptoms are controlled in the future, can consider decreasing Prilosec to 20 mg or taper completely.  -- start magnesium oxide 200 mg daily   -- continue Amitiza, colace as you have been   -- continue zantac 150 mg daily, Prilosec 40 mg daily 3-60 minutes before eating  -- continue phenergan at night as needed for nausea  -- let us know if your symptoms change or worsen  -- can consider calcium supplementation " (500 mg)- can discuss this again with primary care doctor     RTC 4-6 months     Thank you for this consultation. Patient discussed in brief with Dr. Genao. It was a pleasure to participate in the care of this patient; please contact us with any further questions.     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     Summarized from previous documentation with Dr. Genao, please see her notes (most recently 9/5/18) for additional details     Gastroparesis  Gastric emptying study with 2-hour study at [facility] have been consistent with gastroparesis, patient had follow-up study here 6/27/2016 with 49% remaining at 4 hours however the patient was not aware she was on narcotics at the time.  Patient had previous GJ tubes in fall 2016, NG tube used for venting with tube feeds and was able to wean off of tube feeds after TPA T.  At initial visit she reported severe migraine headaches associated with vomiting and is on amitriptyline for migraine prophylaxis.  Patient has used several medication for the symptoms including scopolamine patch, pro-chloro para Rufino, promethazine which helped symptoms.  She has also been seen by neurology and psychology due to concern that she has a difficult time sleeping and with migraines.  At her last visit, patient had been working with dietition to increase caloric intake and was maintaining weight around 150 lbs. She reported occasional nausea without vomiting, with 1 episode in the last month. Symptoms may  have been worsened due to anxiety regarding surgery and ativan had helped.  At the patient's last visit: the patient reports right that she continues to have dry heaving in the morning occurring after breakfast, occasionally after lunch, and oftentimes after dinner.  She is vomiting on average 1-2 times a week which can occur in the morning with waking up or in the evenings with medications.  She feels nauseated on average 3-5 days a week.  She has been conscientious about following diet including eating less raw vegetables, more cooked vegetables, 5-6 meals a day and limiting fat and sugar.  She notes that symptoms seem to be worse than last visit and worsening symptoms occurred in conjunction with worsening constipation.  She has been using scopolamine patches but has increased dry mouth and drowsiness with this medication.  She is also taking Compazine 5 times a week in addition to Phenergan at night about 4 times a week.  She is working on becoming more active and getting back into movement and exercise.     Today the patient reports that since taking the Phenergan at night, her nausea and vomiting has improved significantly and she is not waking up as often.  She does report an episode around the holidays in which he vomited 5-6 times however thinks this may be due to a GI bug.  Now she reports that she has nausea about 2 times a week depending on the week.  Is also taking Compazine as needed and no longer taking Zofran.  She also understands that blood sugar may play a role in her nausea therefore she will check her blood sugar when nauseous.    Constipation/irregular bowel movements/bloating  Patient reports history of ongoing constipation for over 20 years after having her first child.  She has tried multiple interventions including a bowel cleanout, MiraLAX, senna, milk of magnesium, Linzess, and Amitiza for which she was on when she first came to [facility] and  GI clinic.  Patient also takes creon.  Patient  "has done a course of rifaximin in the past with with improvement in bloating and stool consistency.  Patient has also been on specific SIBO/FODMAP diets to the [facility] while avoiding certain food triggers. At the patient's last visit: She had stopped nightly senna but notes continued abdomen discomfort which is more painful when she experiences stomach gurgling. Three weeks ago she became very constipated and vomited. She then completed a moviprep which she has done 3 times in the past year. She notes that last week, she ran out of amitiza and was off this medication for four days. When she did not have this, she said stools appeared greasy and loose and occurred up to 20 times a day. She then became constipated and did not have a bowel movement for three days. After she restarted amitiza, she did not have a bowel movement for three days which was associated with bloating, visible distension of her abdomen, and gas. Today she had a substantial bowel movement described as Whiting Scale 4. Patient had also continues senna 1-2 times a week and continued colace BID, and took one bisacodyl pill last night. She notes occasional bright red blood per rectum but states this is rare.     Today the patient notes that she continues to have occasional watery stools which she describes occurs on \"clearing days \".  This will happen every couple of days in which she describes urgent bowel movements with a large amount of stool requiring multiple trips to the bathroom.  When this occurs, she feels as though it has drained her energy.  Will also have abdominal discomfort associated with this which is slightly alleviated with a heating pad.  On other days, she will have a more solid bowel movement which she describes as a coil.  She plans to reschedule around having a bowel movement and her daily routine that involves exercise, drinking hot water, and guided imagery in order to have 1-2 satisfying bowel movements.  Continues to " follow low FODMAP diet.  She continues to take amities of 5 tablets a day, occasional senna, and daily Colace. On average, the patient will take 12-15 creon tablets a day.     GERD, Dysphagia   Summarized/taken from prior documentation   Patient experiences GERD as substernal and throat burning with nocturnal relaxant causing choking. She had previously followed with Dr. George Flores in out clinic and reports a prior Schatzki's ring requiring previous dilation. Manometry was noral, and pH impedence had a DeMeester of 24 with positive symptoms association. Patient has treated GERD symptoms with BID PPI, Carafate, H2 blocker, and tums. At her last visit, she had been experiencing heartburn and has restarted omeprazole 40 mg daily with continued symptoms with specific food triggers. At the patient's last visit, she was instructed to start zantac 300 mg a day. Today she notes that since adding zantac, she has no breakthrough symptoms.     ROS:    No fevers or chills  No weight loss, + biking   No blurry vision, double vision or change in vision + dry eyes   No sore throat  No lymphadenopathy  + headache,   paraesthesias, or weakness in a limb  No shortness of breath or wheezing  No chest pain or pressure  No arthralgias or myalgias  No rashes or skin changes  No odynophagia or dysphagia  No BRBPR, hematochezia, melena  No dysuria, frequency or urgency  No hot/cold intolerance or polyria  No anxiety or depression    PROBLEM LIST  Patient Active Problem List    Diagnosis Date Noted     Headache, chronic migraine without aura 09/18/2018     Priority: Medium     Chronic pain syndrome 09/18/2018     Priority: Medium     S/P hernia repair 08/23/2018     Priority: Medium     Incisional hernia, without obstruction or gangrene 05/20/2018     Priority: Medium     Adhesive capsulitis of shoulder, unspecified laterality 11/14/2017     Priority: Medium     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 06/26/2017     Priority: Medium      Gastroparesis      Priority: Medium     ACP (advance care planning) 03/28/2017     Priority: Medium     Advance Care Planning 3/28/2017: Receipt of ACP document:  Received: Health Care Directive which was witnessed or notarized on 12/8/16.  Document previously scanned on 1/12/17.  Validation form completed and scanned.  Code Status reflects choices in most recent ACP document..  Confirmed/documented designated decision maker(s).  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 Provider to review and confirm.       GERD (gastroesophageal reflux disease) 12/01/2010     Priority: Medium     Lumbago 04/18/2005     Priority: Medium     History of L5-S1 degenerative disk disease.         Sprain and strain of other specified sites of hip and thigh 12/09/2002     Priority: Medium     Chondromalacia of patella 12/09/2002     Priority: Medium     Need for prophylactic immunotherapy      Priority: Medium     trees, grass, srw, dust mites, cat       Allergic rhinitis due to other allergen 12/21/2001     Priority: Medium     Hypothyroidism      Priority: Medium     Problem list name updated by automated process. Provider to review       Sensorineural hearing loss 01/10/2005     Priority: Medium     Problem list name updated by automated process. Provider to review       Vertiginous syndrome and labyrinthine disorder 01/10/2005     Priority: Medium     Problem list name updated by automated process. Provider to review         PERTINENT PAST MEDICAL HISTORY:  Past Medical History:   Diagnosis Date     Allergic rhinitis, cause  unspecified      Allergy to other foods     strawberries, apples, celeries, alice, watermelon     Arthritis     left knee     Choledocholithiasis     long after cholecystectomy     Chronic abdominal pain      Chronic constipation      Chronic nausea      Chronic pancreatitis (H)      Degeneration of lumbar or lumbosacral intervertebral disc      Esophageal reflux     w/ hiatal hernia     Gastroparesis      Hiatal hernia      History of pituitary adenoma     s/p resection     Hypothyroidism      Migraines      Mild hyperlipidemia      On tube feeding diet     presence of GJ tube     Pancreatic disease     PD stricture, 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 endometriosis growth     APPENDECTOMY       C  DELIVERY ONLY       C  DELIVERY ONLY      repeat c section with incidental cystotomy with repair     C EXCIS PITUITARY,TRANSNASAL/SEPTAL      pituitary tumor removed for diabetes insipidus     C TOTAL ABDOM HYSTERECTOMY      w/ bilateral salpingoophorectomy      C WRIST ARTHROSCOP,RELEASE XVERS LIG Bilateral 08      SECTION       COLONOSCOPY       ENDOSCOPIC RETROGRADE CHOLANGIOPANCREATOGRAM N/A 2015    Procedure: ENDOSCOPIC RETROGRADE CHOLANGIOPANCREATOGRAM;  Surgeon: Mandeep Park MD;  [facility]     ENDOSCOPIC RETROGRADE CHOLANGIOPANCREATOGRAM N/A 2016    Procedure: COMBINED ENDOSCOPIC RETROGRADE CHOLANGIOPANCREATOGRAPHY, PLACE TUBE/STENT;  Surgeon: Mandeep Park MD;  [facility]     ENDOSCOPIC RETROGRADE CHOLANGIOPANCREATOGRAM N/A 3/17/2016    Procedure: COMBINED ENDOSCOPIC RETROGRADE CHOLANGIOPANCREATOGRAPHY, REMOVE FOREIGN BODY OR STENT/TUBE;  Surgeon:  Mandeep Park MD;  [facility]     ENDOSCOPIC RETROGRADE CHOLANGIOPANCREATOGRAM N/A 8/2/2016    Procedure: COMBINED ENDOSCOPIC RETROGRADE CHOLANGIOPANCREATOGRAPHY, PLACE TUBE/STENT;  Surgeon: Mandeep Park MD;  [facility]     ENDOSCOPIC RETROGRADE CHOLANGIOPANCREATOGRAM N/A 8/26/2016    Procedure: COMBINED ENDOSCOPIC RETROGRADE CHOLANGIOPANCREATOGRAPHY, REMOVE FOREIGN BODY OR STENT/TUBE;  Surgeon: aMndeep Park MD;  [facility]     ENDOSCOPIC ULTRASOUND UPPER GASTROINTESTINAL TRACT (GI) N/A 10/3/2016    Procedure: ENDOSCOPIC ULTRASOUND, ESOPHAGOSCOPY / UPPER GASTROINTESTINAL TRACT (GI);  Surgeon: Guru Jose Rodas MD;  [facility]     ESOPHAGOSCOPY, GASTROSCOPY, DUODENOSCOPY (EGD), COMBINED N/A 6/24/2015    Procedure: COMBINED ESOPHAGOSCOPY, GASTROSCOPY, DUODENOSCOPY (EGD), REMOVE FOREIGN BODY;  Surgeon: Mandeep Park MD;  [facility]     ESOPHAGOSCOPY, GASTROSCOPY, DUODENOSCOPY (EGD), COMBINED N/A 10/25/2015    Procedure: COMBINED ESOPHAGOSCOPY, GASTROSCOPY, DUODENOSCOPY (EGD);  Surgeon: Sammy Amaro MD;  [facility]     ESOPHAGOSCOPY, GASTROSCOPY, DUODENOSCOPY (EGD), COMBINED N/A 10/25/2015    Procedure: COMBINED ESOPHAGOSCOPY, GASTROSCOPY, DUODENOSCOPY (EGD), BIOPSY SINGLE OR MULTIPLE;  Surgeon: Sammy Amaro MD;  Location: [facility]     ESOPHAGOSCOPY, GASTROSCOPY, DUODENOSCOPY (EGD), DILATATION, COMBINED       EXCISE LESION TRUNK N/A 4/17/2017    Procedure: EXCISE LESION TRUNK;  Removal of Abdominal Foreign Body;  Surgeon: Nestor Phoenix MD;  [facility]     HC ESOPH/GAS REFLUX TEST W NASAL IMPED >1 HR N/A 11/19/2015    Procedure: ESOPHAGEAL IMPEDENCE FUNCTION TEST WITH 24 HOUR PH GREATER THAN 1 HOUR;  Surgeon: Thiago Apple MD;  Location: [facility]     HC UGI ENDOSCOPY DIAG W BIOPSY  9/17/08     HC UGI ENDOSCOPY DIAG W BIOPSY  9/27/12     HC UGI ENDOSCOPY W ESOPHAGEAL DILATION BALLOON <30MM  9/17/08      [facility]  ENDOSCOPY W EUS N/A 5/5/2015    Procedure: COMBINED ENDOSCOPIC ULTRASOUND, ESOPHAGOSCOPY, GASTROSCOPY, DUODENOSCOPY (EGD);  Surgeon: Wm Dueñas MD;  Location: [facility]     INJECT TRANSVERSUS ABDOMINIS PLANE (TAP) BLOCK BILATERAL Left 9/22/2016    Procedure: INJECT TRANSVERSUS ABDOMINIS PLANE (TAP) BLOCK BILATERAL;  Surgeon: Dickson Corrigan MD;  [facility]     laparoscopic pineda  1995     LAPAROSCOPIC HERNIORRHAPHY INCISIONAL N/A 8/23/2018    Procedure: LAPAROSCOPIC HERNIORRHAPHY INCISIONAL;  Laparoscopic Incisional Hernia Repair with Symbotex Mesh Implant;  Surgeon: Nestor Phoenix MD;  [facility]     LAPAROSCOPIC PANCREATECTOMY, TRANSPLANT AUTO ISLET CELL N/A 12/28/2016    Procedure: LAPAROSCOPIC PANCREATECTOMY, TRANSPLANT AUTO ISLET CELL;  Surgeon: Nestor Phoenix MD;  Location: [facility]     transphenoidal pituitary resection  1990       ALLERGIES:  Allergies   Allergen Reactions     Apple Anaphylaxis     Depakote [Divalproex Sodium] Other (See Comments)     Chest pain     Zithromax [Azithromycin Dihydrate] Anaphylaxis     Noted in 4/7/08 ER     Darvocet [Propoxyphene N-Apap] Itching     Morphine Nausea and Vomiting and Rash     Nalbuphine Hcl Rash     RASH :nubaine     Zosyn [Piperacillin-Tazobactam In D5w] Rash     Possible allergy, did have a diffuse rash that seemed drug related but could have also been related to soap in the hospital.      Bactrim [Sulfamethoxazole W-Trimethoprim] Other (See Comments) and Nausea and Vomiting     Severely low liver function.     Cats      Compazine [Prochlorperazine] Other (See Comments)     Twitching. Takes Benedryl and is fine     Corticosteroids Other (See Comments)     All oral,IV and injectable steroids are contraindicated (unless in life threatening situations) in [facility] transplant recipients. They can cause irreversible loss of islet cell function. Please contact patients transplant care coordinator Mecca ANGEL @ [phone]/Pager  [phone], and Endocrinologist prior to administration.     Dust Mites      Grass      Prednisone Other (See Comments)     Insomnia       Ragweeds      Tape [Adhesive Tape] Blisters     Tramadol      Trees      Zofran [Ondansetron] Other (See Comments)     migraine     Flagyl [Metronidazole] Hives and Rash       PERTINENT MEDICATIONS:    Current Outpatient Medications:      acetaminophen 500 MG CAPS, Take 2 mg by mouth 2 times daily, Disp: , Rfl:      amitriptyline (ELAVIL) 50 MG tablet, Take 1 tablet (50 mg) by mouth At Bedtime (Patient taking differently: Take 40 mg by mouth At Bedtime ), Disp: 60 tablet, Rfl: 3     amylase-lipase-protease (CREON 24) 03347-29986 units CPEP per EC capsule, Take 3-4 capsules by mouth with meals and 1-2 with snacks. Maximum 15 capsules per day., Disp: 450 capsule, Rfl: 11     aspirin 81 MG EC tablet, Take 1 tablet (81 mg) by mouth daily, Disp: 90 tablet, Rfl: 3     azelastine (ASTELIN) 0.1 % spray, Spray 1 spray into both nostrils 2 times daily, Disp: , Rfl:      blood glucose monitoring (PAT CONTOUR NEXT) test strip, Use to test blood sugar 6 times daily or as directed., Disp: 2 Box, Rfl: 11     blood glucose monitoring (PAT MICROLET) lancets, Use to test blood sugar 6-8 times daily or as directed., Disp: 1 Box, Rfl: prn     cetirizine (ZYRTEC) 10 MG tablet, Take 10 mg by mouth daily, Disp: , Rfl:      diphenhydrAMINE (BENADRYL ALLERGY) 25 MG capsule, Take 1 capsule (25 mg) by mouth every 6 hours as needed for itching or allergies, Disp: 56 capsule, Rfl: 0     docusate sodium (COLACE) 100 MG capsule, Take 1 capsule (100 mg) by mouth 2 times daily as needed for constipation,, Disp: 60 capsule, Rfl: 0     estradiol (VAGIFEM) 10 MCG TABS vaginal tablet, , Disp: , Rfl:      FOLIC ACID PO, Take 1 mg by mouth daily, Disp: , Rfl:      glucose 40 % GEL gel, Take 15-30 g by mouth every 15 minutes as needed for low blood sugar, Disp: 3 Tube, Rfl: 2     ibuprofen (ADVIL/MOTRIN) 400 MG  tablet, Take 1 tablet (400 mg) by mouth every 6 hours as needed for moderate pain, Disp: 30 tablet, Rfl: 0     levothyroxine (SYNTHROID) 137 MCG tablet, Take 137 mcg by mouth daily, Disp: , Rfl:      LORazepam (ATIVAN) 1 MG tablet, Take 1 tablet (1 mg) by mouth every 6 hours as needed for anxiety, Disp: 10 tablet, Rfl: 0     Lubiprostone (AMITIZA) 8 MCG CAPS capsule, Take 5 capsules (40 mcg) by mouth daily Take 3 capsules in AM and 2 capsules in PM, Disp: 150 capsule, Rfl: 3     lubiprostone (AMITIZA) 8 MCG capsule, Take 5 capsules (40 mcg) by mouth daily Take 3 capsules in AM and 2 capsules in PM, Disp: 150 capsule, Rfl: 3     multivitamin CF formula (CHOICEFUL) capsule, Take 1 tablet by mouth daily, Disp: , Rfl: 11     omeprazole (PRILOSEC) 40 MG capsule, Take 1 capsule (40 mg) by mouth daily, Disp: 90 capsule, Rfl: 3     order for DME, Equipment being ordered:Cefaly, Disp: 1 Device, Rfl: 0     prochlorperazine (COMPAZINE) 5 MG tablet, Take two 5 mg tablets by mouth every six hours as needed for nausea., Disp: 90 tablet, Rfl: 3     promethazine (PHENERGAN) 25 MG tablet, Take 1 tablet (25 mg) by mouth At Bedtime, Disp: 60 tablet, Rfl: 3     ranitidine (ZANTAC) 150 MG tablet, Take 2 tablets (300 mg) by mouth daily, Disp: 180 tablet, Rfl: 3     senna-docusate (SENOKOT-S;PERICOLACE) 8.6-50 MG per tablet, Take 1-2 tablets by mouth 2 times daily, Disp: 100 tablet, Rfl: 0     Sharps Container (BD SHARPS ) MISC, 1 Container as needed, Disp: 1 each, Rfl: 1     ZOLMitriptan (ZOMIG) 5 MG tablet, Take 5 mg by mouth at onset of headache for migraine, Disp: , Rfl:      amitriptyline (ELAVIL) 10 MG tablet, TAKE 5 TABLETS BY MOUTH EVERY NIGHT AT BEDTIME (Patient not taking: Reported on 2/1/2019), Disp: 150 tablet, Rfl: 0     HYDROmorphone (DILAUDID) 2 MG tablet, Take 0.5-1 tablets (1-2 mg) by mouth every 3 hours as needed for moderate to severe pain (Patient not taking: Reported on 9/5/2018), Disp: 15 tablet, Rfl: 0      ranitidine (ZANTAC) 150 MG tablet, Take 1 tablet (150 mg) by mouth At Bedtime (Patient not taking: Reported on 2018), Disp: 90 tablet, Rfl: 3    SOCIAL HISTORY:  Social History     Socioeconomic History     Marital status:      Spouse name: Norris     Number of children: 3     Years of education: 16     Highest education level: Not on file   Social Needs     Financial resource strain: Not on file     Food insecurity - worry: Not on file     Food insecurity - inability: Not on file     Transportation needs - medical: Not on file     Transportation needs - non-medical: Not on file   Occupational History     Occupation: director     Employer: [employer]   Tobacco Use     Smoking status: Former Smoker     Packs/day: 0.50     Years: 6.00     Pack years: 3.00     Types: Cigarettes     Start date: 1985     Last attempt to quit: 1992     Years since quittin.1     Smokeless tobacco: Never Used     Tobacco comment: no 2nd hand   Substance and Sexual Activity     Alcohol use: Yes     Alcohol/week: 1.8 - 3.6 oz     Types: 1 - 2 Glasses of wine, 1 - 2 Cans of beer, 1 - 2 Shots of liquor per week     Comment: occasional     Drug use: No     Sexual activity: Yes     Partners: Male     Birth control/protection: None     Comment: Hystectomy    Other Topics Concern     Parent/sibling w/ CABG, MI or angioplasty before 65F 55M? No      Service Not Asked     Blood Transfusions No     Caffeine Concern Not Asked     Occupational Exposure Not Asked     Hobby Hazards Not Asked     Sleep Concern Not Asked     Stress Concern Not Asked     Weight Concern Not Asked     Special Diet Not Asked     Back Care Not Asked     Exercise Not Asked     Bike Helmet Not Asked     Seat Belt Yes     Self-Exams Not Asked   Social History Narrative     with 3 children and a dog.  No smoking, etoh or drug use.  Worked as a  for [employer] in the past.  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       FAMILY HISTORY:  Family History   Problem Relation Age of Onset     Eye Disorder Father         cataract, detached retina     Myocardial Infarction Father 60     Lipids Father      Cerebrovascular Disease Father      Depression Father      Substance Abuse Father      Anesthesia Reaction Father         stroke right after surgery     Cataracts Father      Osteoarthritis Father      Ulcerative Colitis Father      Lipids Mother      Hypertension Mother      Thyroid Disease Mother      Depression Mother      Angina Mother      GERD Mother      Skin Cancer Mother      Eye Disorder Son         ptosis     Eye Disorder Paternal Grandmother         cataract     Eye Disorder Paternal Grandfather         cataract     Diabetes Paternal Grandfather      Eye Disorder Maternal Grandmother         cataract     Thyroid Disease Maternal Grandmother      Coronary Artery Disease Sister         angioplasty     GERD Sister      Substance Abuse Sister      Depression Sister      Depression Son      Anxiety Disorder Son      Thyroid Disease Sister      Diabetes Maternal Grandfather      Depression Nephew      Anxiety Disorder Nephew      Thyroid Disease Nephew      Diabetes Type 2  Cousin         paternal cousin     Heart Disease Paternal Aunt      Diabetes Paternal Aunt      Diabetes Paternal Uncle      Heart Disease Paternal Uncle      Past/family/social history reviewed and no changes    PHYSICAL EXAMINATION:  Constitutional: aaox3, cooperative, pleasant, not dyspneic/diaphoretic, no acute distress  Vitals reviewed: /65   Pulse 98   Ht 1.727 m (5' 8\")   Wt 69.8 kg (153 lb 14.4 oz)   SpO2 98%   BMI 23.40 kg/m    Wt:   Wt Readings from Last 2 Encounters:   02/01/19 69.8 kg (153 lb 14.4 oz)   11/21/18 69.6 kg (153 lb 6.4 oz)      Eyes: Sclera anicteric/injected  Ears/nose/mouth/throat: Normal oropharynx without ulcers or exudate, mucus membranes moist, hearing intact  Neck: supple, thyroid " normal size  CV: No edema  Respiratory: Unlabored breathing  Abdominal: soft non-tender to palpation, no hepatosplenomegaly   Skin: warm, perfused, no jaundice  Psych: Normal affect  MSK: Normal gait      PERTINENT STUDIES:  Orders Only on 09/13/2018   Component Date Value Ref Range Status     WBC 09/13/2018 5.8  4.0 - 11.0 10e9/L Final     RBC Count 09/13/2018 4.51  3.8 - 5.2 10e12/L Final     Hemoglobin 09/13/2018 14.1  11.7 - 15.7 g/dL Final     Hematocrit 09/13/2018 42.1  35.0 - 47.0 % Final     MCV 09/13/2018 93  78 - 100 fl Final     MCH 09/13/2018 31.3  26.5 - 33.0 pg Final     MCHC 09/13/2018 33.5  31.5 - 36.5 g/dL Final     RDW 09/13/2018 13.2  10.0 - 15.0 % Final     Platelet Count 09/13/2018 430  150 - 450 10e9/L Final     Diff Method 09/13/2018 Automated Method   Final     % Neutrophils 09/13/2018 37.1  % Final     % Lymphocytes 09/13/2018 40.5  % Final     % Monocytes 09/13/2018 10.5  % Final     % Eosinophils 09/13/2018 9.8  % Final     % Basophils 09/13/2018 2.1  % Final     % Immature Granulocytes 09/13/2018 0.0  % Final     Nucleated RBCs 09/13/2018 0  0 /100 Final     Absolute Neutrophil 09/13/2018 2.2  1.6 - 8.3 10e9/L Final     Absolute Lymphocytes 09/13/2018 2.4  0.8 - 5.3 10e9/L Final     Absolute Monocytes 09/13/2018 0.6  0.0 - 1.3 10e9/L Final     Absolute Eosinophils 09/13/2018 0.6  0.0 - 0.7 10e9/L Final     Absolute Basophils 09/13/2018 0.1  0.0 - 0.2 10e9/L Final     Abs Immature Granulocytes 09/13/2018 0.0  0 - 0.4 10e9/L Final     Absolute Nucleated RBC 09/13/2018 0.0   Final     Sodium 09/13/2018 138  133 - 144 mmol/L Final     Potassium 09/13/2018 4.3  3.4 - 5.3 mmol/L Final     Chloride 09/13/2018 102  94 - 109 mmol/L Final     Carbon Dioxide 09/13/2018 30  20 - 32 mmol/L Final     Anion Gap 09/13/2018 5  3 - 14 mmol/L Final     Glucose 09/13/2018 119* 70 - 99 mg/dL Final     Urea Nitrogen 09/13/2018 12  7 - 30 mg/dL Final     Creatinine 09/13/2018 0.83  0.52 - 1.04 mg/dL Final      GFR Estimate 09/13/2018 72  >60 mL/min/1.7m2 Final     GFR Estimate If Black 09/13/2018 87  >60 mL/min/1.7m2 Final     Calcium 09/13/2018 9.2  8.5 - 10.1 mg/dL Final     Bilirubin Direct 09/13/2018 0.2  0.0 - 0.2 mg/dL Final     Bilirubin Total 09/13/2018 0.8  0.2 - 1.3 mg/dL Final     Albumin 09/13/2018 3.9  3.4 - 5.0 g/dL Final     Protein Total 09/13/2018 7.8  6.8 - 8.8 g/dL Final     Alkaline Phosphatase 09/13/2018 145  40 - 150 U/L Final     ALT 09/13/2018 59* 0 - 50 U/L Final     AST 09/13/2018 39  0 - 45 U/L Final     IGG 09/13/2018 1160  695 - 1620 mg/dL Final     IgG1 09/13/2018 416  300 - 856 mg/dL Final     IgG2 09/13/2018 384  158 - 761 mg/dL Final     IgG3 09/13/2018 83  24 - 192 mg/dL Final     IgG4 09/13/2018 95* 11 - 86 mg/dL Final       Answers for HPI/ROS submitted by the patient on 1/18/2019   General Symptoms: No  Skin Symptoms: No  HENT Symptoms: Yes  EYE SYMPTOMS: Yes  HEART SYMPTOMS: No  LUNG SYMPTOMS: No  INTESTINAL SYMPTOMS: Yes  URINARY SYMPTOMS: No  GYNECOLOGIC SYMPTOMS: No  BREAST SYMPTOMS: No  SKELETAL SYMPTOMS: Yes  BLOOD SYMPTOMS: No  NERVOUS SYSTEM SYMPTOMS: No  MENTAL HEALTH SYMPTOMS: No  Ear pain: No  Ear discharge: No  Hearing loss: No  Tinnitus: No  Nosebleeds: No  Congestion: Yes  Sinus pain: No  Trouble swallowing: Yes   Voice hoarseness: No  Mouth sores: No  Sore throat: No  Tooth pain: No  Gum tenderness: No  Bleeding gums: No  Change in taste: No  Change in sense of smell: No  Dry mouth: Yes  Hearing aid used: No  Neck lump: No  Eye pain: Yes  Vision loss: No  Dry eyes: Yes  Watery eyes: No  Eye bulging: No  Double vision: No  Flashing of lights: Yes  Spots: No  Floaters: No  Redness: No  Crossed eyes: No  Tunnel Vision: No  Yellowing of eyes: No  Eye irritation: No  Heart burn or indigestion: No  Nausea: Yes  Vomiting: Yes  Abdominal pain: Yes  Bloating: Yes  Constipation: Yes  Diarrhea: Yes  Blood in stool: No  Black stools: No  Rectal or Anal pain: No  Fecal incontinence:  No  Yellowing of skin or eyes: No  Vomit with blood: No  Change in stools: Yes  Back pain: No  Muscle aches: No  Neck pain: Yes  Swollen joints: No  Joint pain: No  Bone pain: Yes  Muscle cramps: Yes  Muscle weakness: No  Joint stiffness: No  Bone fracture: No

## 2019-02-05 LAB — COPPER SERPL-MCNC: 129 UG/DL (ref 80–155)

## 2019-02-05 NOTE — PROGRESS NOTES
Service Date: 2019      Latasha Paul, CANDIDO   [facility]    [address] [facility] [facility] [address]   [place] [address]      RE: Dinora Loo   MRN: [id number]   : 1965      Dear Ms. Beverly:      This is in regard to followup on Dinora Loo.  The patient returned today with a chief complaint of headache, light sensitivity, and restless legs syndrome.      The patient said that her headaches seem to be much better controlled now.  She is taking amitriptyline and her highest dose was 60 mg.  She was able to decrease the dose to 40 mg and she feels her headaches have stabilized on that current prescription.  She denied any significant problems taking the medication.  She does have some mild orthostatic complaints.  I have talked with her in the past about squeezing her hands and also her legs together.  She said that definitely helps and she has not any adin orthostasis.  She said that she averages now about 1 major headache every 2 weeks.  This is a marked decrease in her prior frequency.  They are not as intense.  She does not feel she has had a full blown headache for at least 4 months.  She still notes that she has visual aura.  She has what she described as sparkles.  She can have these for 10-20 minutes and sometimes they do repeat.  Generally, they herald the headache, although she can wake up with them.  She typically has been using 5 mg Zomig tablets up to 4 times per month.  She did review with me her other issue with restless legs syndrome.  I did go over with her her low ferritin level of 17 from 2019.  I pointed out to her that patients with restless legs should probably have iron replacement if the level is under 75.  She did review with me her previous history of chronic pancreatitis and her Vera-en-Y anastomosis.  I went over with her her various blood tests that have been done and I do not see a recent copper level, although her zinc level was checked and it  was 60, which is normal.  Her last A1C hemoglobin was 6 and her vitamin E level was 10.1 and her E gamma level 0.9.  The patient's vitamin A level was 0.43 and her B12 level 885 picograms.  Her vitamin D3 level was 33.  The patient is going to review with you and her gastroenterologist whether she should have now further oral iron therapy or receive IV iron.  The patient did tell me that her restless legs will keep her up at night and sometimes she has poor sleep because of it.  She is aware that poor sleep can also trigger her headaches.  She reviewed with me too her chronic issues with sun sensitivity.  This has been an inciting factor for her headaches in the past.  I had reviewed with her in the past FL-41 lenses.  She is interested in having prescription lenses and would like to see one of our ophthalmologists concerning her visual aura as well as sun sensitivity and explore the possibility of these lenses.  She denied any paresthesias nor any trouble controlling urine or stool.  She did not have any other complaints except for dry mouth.  She relates this to her medications including amitriptyline.  She is using Biotene and keeping her mouth moist with liquids.  She has not had any increase in cavities.      Neurologic examination revealed a pleasant woman.  Her blood pressure is 104/65 with a pulse of 98.  She had a regular cardiac rhythm without gallops or murmurs.  She did not have cervical bruits.  Muscle stretch reflexes were present and symmetric except for absent ankle jerks.  Her toes were downward going to plantar stimulation.  Strength testing was normal.  She had normal vibratory and position sense testing as well as cranial nerve examination.      IMPRESSION:   1.  Stable migraine.   2.  Sun sensitivity.   3.  Restless leg syndrome, probably related to chronically low iron stores.      I reviewed with the patient her different issues including her restless leg syndrome.  She is going to be  reviewing with you and her gastroenterologist whether she should have further workup for iron deficiency and treatment.  The patient will be referred to one of our neuro-ophthalmologists.  I refilled her medication and she will be seen in this office now on a p.r.n. basis.  I have talked with her about the possibility of trying to taper amitriptyline further if she is headache free over the last year, although she has had headaches for many years and finds that the current dose of amitriptyline is quite helpful.      I have asked that she have followup copper level done if she has further blood tests soon.      Thank you again for allowing me to see this patient.  If you should have any questions, please feel free to contact me.        Sincerely yours,       Gary Canchola MD      I spent 30 minutes with the patient.  Over 50% of the time this involved counseling and coordination of care.         GARY CANCHOLA MD             D: 2019   T: 2019   MT: AKA      Name:     MALINI ADVIS   MRN:      [id number]        Account:      [id number]   :      1965           Service Date: 2019      Document: [id number]

## 2019-02-13 LAB
FOLATE RBC-MCNC: 671 NG/ML
HCT VFR BLD CALC: 39 %

## 2019-02-18 ASSESSMENT — ENCOUNTER SYMPTOMS
HEARTBURN: 0
TREMORS: 0
EYE WATERING: 0
JOINT SWELLING: 0
MYALGIAS: 0
EYE REDNESS: 0
LOSS OF CONSCIOUSNESS: 0
MUSCLE WEAKNESS: 0
TINGLING: 0
WEAKNESS: 0
NECK PAIN: 0
BLOOD IN STOOL: 0
HEADACHES: 1
DIZZINESS: 0
DIARRHEA: 1
ABDOMINAL PAIN: 1
JAUNDICE: 0
DISTURBANCES IN COORDINATION: 0
RECTAL PAIN: 0
NAUSEA: 1
STIFFNESS: 1
SPEECH CHANGE: 0
SEIZURES: 0
VOMITING: 1
BACK PAIN: 0
HOT FLASHES: 1
CONSTIPATION: 0
NUMBNESS: 0
EYE PAIN: 1
BOWEL INCONTINENCE: 0
ARTHRALGIAS: 1
DOUBLE VISION: 0
MUSCLE CRAMPS: 0
DECREASED LIBIDO: 1
MEMORY LOSS: 0
BLOATING: 1
EYE IRRITATION: 1
PARALYSIS: 0

## 2019-02-19 ENCOUNTER — OFFICE VISIT (OUTPATIENT)
Dept: OPHTHALMOLOGY | Facility: CLINIC | Age: 54
End: 2019-02-19
Attending: OPHTHALMOLOGY
Payer: COMMERCIAL

## 2019-02-19 ENCOUNTER — OFFICE VISIT (OUTPATIENT)
Dept: INTERNAL MEDICINE | Facility: CLINIC | Age: 54
End: 2019-02-19
Payer: COMMERCIAL

## 2019-02-19 VITALS
BODY MASS INDEX: 24.17 KG/M2 | HEART RATE: 111 BPM | WEIGHT: 154 LBS | HEIGHT: 67 IN | SYSTOLIC BLOOD PRESSURE: 127 MMHG | DIASTOLIC BLOOD PRESSURE: 82 MMHG

## 2019-02-19 DIAGNOSIS — H53.10 SUBJECTIVE VISUAL DISTURBANCE: ICD-10-CM

## 2019-02-19 DIAGNOSIS — E89.1 POST-PANCREATECTOMY DIABETES (H): ICD-10-CM

## 2019-02-19 DIAGNOSIS — R51.9 HEADACHE DISORDER: ICD-10-CM

## 2019-02-19 DIAGNOSIS — E03.9 HYPOTHYROIDISM, UNSPECIFIED TYPE: Primary | ICD-10-CM

## 2019-02-19 DIAGNOSIS — K86.89 PANCREATIC INSUFFICIENCY: ICD-10-CM

## 2019-02-19 DIAGNOSIS — Z90.410 POST-PANCREATECTOMY DIABETES (H): ICD-10-CM

## 2019-02-19 DIAGNOSIS — E13.9 POST-PANCREATECTOMY DIABETES (H): ICD-10-CM

## 2019-02-19 DIAGNOSIS — H53.10 SUBJECTIVE VISUAL DISTURBANCE: Primary | ICD-10-CM

## 2019-02-19 DIAGNOSIS — Z91.09 SENSITIVITY TO SUNLIGHT: ICD-10-CM

## 2019-02-19 DIAGNOSIS — E03.9 HYPOTHYROIDISM, UNSPECIFIED TYPE: ICD-10-CM

## 2019-02-19 LAB — TSH SERPL DL<=0.005 MIU/L-ACNC: 1.83 MU/L (ref 0.4–4)

## 2019-02-19 PROCEDURE — 92015 DETERMINE REFRACTIVE STATE: CPT | Mod: ZF

## 2019-02-19 PROCEDURE — G0463 HOSPITAL OUTPT CLINIC VISIT: HCPCS | Mod: ZF

## 2019-02-19 RX ORDER — CHLORAL HYDRATE 500 MG
1 CAPSULE ORAL DAILY
COMMUNITY
Start: 2019-02-02 | End: 2021-06-23

## 2019-02-19 RX ORDER — LEVOTHYROXINE SODIUM 137 UG/1
137 TABLET ORAL DAILY
Qty: 90 TABLET | Refills: 3 | Status: SHIPPED | OUTPATIENT
Start: 2019-02-19 | End: 2019-09-17

## 2019-02-19 ASSESSMENT — REFRACTION_WEARINGRX
OD_SPHERE: +3.00
OD_AXIS: 110
OS_CYLINDER: +1.25
OD_ADD: +2.50
OS_AXIS: 060
OD_CYLINDER: +0.75
OS_ADD: +2.50
OS_SPHERE: +3.00

## 2019-02-19 ASSESSMENT — SLIT LAMP EXAM - LIDS
COMMENTS: NORMAL
COMMENTS: NORMAL

## 2019-02-19 ASSESSMENT — VISUAL ACUITY
METHOD: SNELLEN - LINEAR
OD_CC: 20/20
OS_CC: 20/25
CORRECTION_TYPE: GLASSES
OS_CC+: +2

## 2019-02-19 ASSESSMENT — CONF VISUAL FIELD
OD_NORMAL: 1
OS_NORMAL: 1

## 2019-02-19 ASSESSMENT — REFRACTION_MANIFEST
OS_AXIS: 055
OD_SPHERE: +3.75
OS_CYLINDER: +1.50
OD_CYLINDER: +0.50
OS_SPHERE: +3.50
OD_ADD: +2.25
OS_ADD: +2.25
OD_AXIS: 115

## 2019-02-19 ASSESSMENT — TONOMETRY
OS_IOP_MMHG: 16
OD_IOP_MMHG: 15
IOP_METHOD: ICARE

## 2019-02-19 ASSESSMENT — CUP TO DISC RATIO
OS_RATIO: 0.25
OD_RATIO: 0.3

## 2019-02-19 ASSESSMENT — PAIN SCALES - GENERAL: PAINLEVEL: MODERATE PAIN (5)

## 2019-02-19 ASSESSMENT — EXTERNAL EXAM - RIGHT EYE: OD_EXAM: NORMAL

## 2019-02-19 ASSESSMENT — MIFFLIN-ST. JEOR: SCORE: 1348.17

## 2019-02-19 ASSESSMENT — EXTERNAL EXAM - LEFT EYE: OS_EXAM: NORMAL

## 2019-02-19 NOTE — NURSING NOTE
Chief Complaints and History of Present Illnesses   Patient presents with     Eye Exam For Headaches     Chief Complaint(s) and History of Present Illness(es)     Dinora Mcghee is a 53 year old female who presents today for  Headaches  Photophobia    Longstanding migraines. Onset 25 years ago since her pregnancy.   Headaches typically start with visual auras hours in advance.   Time of day: typically in the middle of the night. Can last up to 2.5 days.   Takes Zolmitriptan as needed.     Evangelist JEFFERSON 9:32 AM February 19, 2019

## 2019-02-19 NOTE — LETTER
"2019         RE:  :  MRN: Dinora Mcghee  1965  [id number]     Dear Dr. Ruiz Canchola,    Thank you for asking me to see your very pleasant patient, Dinora Mcghee, in neuro-ophthalmic consultation.  I would like to thank you for sending your records and I have summarized them in the history of present illness. She presented with her {Family Member:[id number]} who provided additional history.  My assessment and plan are below.  For further details, please see my attached clinic note.           Assessment & Plan     Dinora Mcghee is a 53 year old female with the following diagnoses:   1. Headache disorder    2. Sensitivity to sunlight    3. Subjective visual disturbance       Referred by Dr. Canchola for light sensitivity in setting of chronic migraine headaches (in which she sees Dr. Canchola for). Has had migraine headaches x 25 years. Most of the time she gets a visual aura. She gets extreme light sensitivity. Baseline, she is light sensitive as well. She also gets pressure sensation (perorbital right > left). One \"big migraine\" in the past 3 months. Significantly improved.     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     Meds: amitriptyline, creon, aspirin 81, levothyroxine, lorazepam, lubiprostone, omeprazole     Past ocular history: refractive error     On examination, her visual acuity is 20/20 right eye and 20/25+ left eye. Color plates full both eyes. Anterior segment examination and funduscopic examination unremarkable.     It is my impression that Dinora has photosensitivity. She is a chronic migraineur. We discussed FL 41 tinted lenses, smart light bulbs, and accommodations at work. Her eye examination is otherwise unremarkable. Return to clinic as needed.         Again, thank " you for allowing me to participate in the care of your patient.      Sincerely,    Watson Ingram MD  Professor  Ophthalmology Residency   Director of Neuro-Ophthalmology  [employer] Endowed Chair  Departments of Ophthalmology, Neurology, and Neurosurgery  [facility] [address]  [address] [facility], [place]  [address]  T - [phone]  F - [phone]  JOSE RAMON [email or web address]      CC: Martinez Arteaga MD  [address] [facility] [address]  VIA In Basket     Ruiz Canchola MD  [facility]  [address] [facility] [address]  [facility] [address]  VIA In Basket     Latasha Paul APRN CNP  [address] [facility]  [facility] [address]  VIA In Basket     Mandeep Park MD  [address] [facility] [address]  [facility] [address]  VIA In Basket     Gato Wisdom, OD  [facility] [place] [facility]  [address]  [place] [address]  VIA Facsimile: [phone]     Gloria Melissa MD  [address]  [facility] [address]  VIA In Basket     Vijaya Genao MD  [address] [facility] [address]  [facility] [address]  VIA In Basket     Gina Ervin RN  VIA In Basket       DX = photophobia

## 2019-02-19 NOTE — PATIENT INSTRUCTIONS
[facility] Medication Refill Request Information:  * Please contact your pharmacy regarding ANY request for medication refills.  ** [facility] Prescription Fax = [phone]  * Please allow 3 business days for routine medication refills.  * Please allow 5 business days for controlled substance medication refills.     [facility] Test Result notification information:  *You will be notified with in 7-10 days of your appointment day regarding the results of your test.  If you are on MyChart you will be notified as soon as the provider has reviewed the results and signed off on them.    [facility]: [phone]

## 2019-02-19 NOTE — NURSING NOTE
Chief Complaint   Patient presents with     Physical     pt here for physical     Medication Request     pt would like to discuss medications       Gauri Mckenna CMA at 3:09 PM on 2/19/2019.

## 2019-02-19 NOTE — PROGRESS NOTES
"       Assessment & Plan     Dinora Mcghee is a 53 year old female with the following diagnoses:   1. Headache disorder    2. Sensitivity to sunlight    3. Subjective visual disturbance       Referred by Dr. Canchola for light sensitivity in setting of chronic migraine headaches (in which she sees Dr. Canchola for). Has had migraine headaches x 25 years. Most of the time she gets a visual aura. She gets extreme light sensitivity. Baseline, she is light sensitive as well. She also gets pressure sensation (perorbital right > left). One \"big migraine\" in the past 3 months. Significantly improved.     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     Meds: amitriptyline, creon, aspirin 81, levothyroxine, lorazepam, lubiprostone, omeprazole     Past ocular history: refractive error     On examination, her visual acuity is 20/20 right eye and 20/25+ left eye. Color plates full both eyes. Anterior segment examination and funduscopic examination unremarkable.     It is my impression that Dinora has photosensitivity. She is a chronic migraineur and this is the most likely cause.  I explained that migraine patients tend to be much more light sensitive than normal individuals.  We discussed FL 41 tinted lenses, smart light bulbs, and accommodations at work. Could consider tinted contact lenses.  Her eye examination is otherwise unremarkable. Return to clinic as needed.          Attending Physician Attestation:  Complete documentation of historical and exam elements from today's encounter can be found in the full encounter summary report (not reduplicated in this progress note).  I personally obtained the chief complaint(s) and history of present illness.  I confirmed and edited as necessary the review of systems, past " medical/surgical history, family history, social history, and examination findings as documented by others; and I examined the patient myself.  I personally reviewed the relevant tests, images, and reports as documented above.  I formulated and edited as necessary the assessment and plan and discussed the findings and management plan with the patient and family. - Watson Potts MD  Neuro-ophthalmology Fellow

## 2019-02-19 NOTE — PATIENT INSTRUCTIONS
You are sensitive to the light.  There is generally no known cause for this.  There is a specialized tint called FL-41 that blocks a certain wavelength of light known to cause light sensitivity. Your eyeglass lenses can be tinted with this at just about any eyeglass store but not all stores carry this tint.  You should call before visiting the store.  The store may try to substitute with a tint that they have in stock, but I would recommend against it.  There is also a company that will sell FL-41 tinted lenses online at [employer] or [employer].      Generally speaking, when you are at home, try not to wear sunglasses inside, especially the evenings.  The more you wear them, the less tolerant of light you become.  This does not apply to the FL-41 lenses since they are not that dark.  There are also smart LED light bulbs that can be controlled from your smart phone.  They can be made any color and any brightness.  You may find that there is a particular color and brightness that helps you.      There are tinted contact lenses.  These can be made using the FL-41 tint or another color.  They are very custom made and so they tend to cost hundreds of dollars.      You can also consider wearing a hat, tinting your windshield, get a shield above your cubicle, tinting the windows in your home and change the bulbs in your office.

## 2019-02-19 NOTE — LETTER
"2019         RE:  :  MRN: Dinora Mcghee  1965  [id number]     Dear Dr. Canchola,    Thank you for asking me to see your very pleasant patient, Dinora Mcghee, in neuro-ophthalmic consultation.  I would like to thank you for sending your records and I have summarized them in the history of present illness.  My assessment and plan are below.  For further details, please see my attached clinic note.      Assessment & Plan   Dinora Mcghee is a 53 year old female with the following diagnoses:   1. Headache disorder    2. Sensitivity to sunlight    3. Subjective visual disturbance       Referred by Dr. Canchola for light sensitivity in setting of chronic migraine headaches (in which she sees Dr. Canchola for). Has had migraine headaches x 25 years. Most of the time she gets a visual aura. She gets extreme light sensitivity. Baseline, she is light sensitive as well. She also gets pressure sensation (perorbital right > left). One \"big migraine\" in the past 3 months. Significantly improved.     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     Meds: amitriptyline, creon, aspirin 81, levothyroxine, lorazepam, lubiprostone, omeprazole     Past ocular history: refractive error     On examination, her visual acuity is 20/20 right eye and 20/25+ left eye. Color plates full both eyes. Anterior segment examination and funduscopic examination unremarkable.     It is my impression that Dinora has photosensitivity. She is a chronic migraineur and this is the most likely cause.  I explained that migraine patients tend to be much more light sensitive than normal individuals.  We discussed FL 41 tinted lenses, smart light bulbs, and accommodations at work. Could consider tinted contact lenses.  Her eye examination is " otherwise unremarkable. Return to clinic as needed.     Again, thank you for allowing me to participate in the care of your patient.      Sincerely,    Watson Ingram MD  Professor  Ophthalmology Residency   Director of Neuro-Ophthalmology  [employer] Endow Chair  Departments of Ophthalmology, Neurology, and Neurosurgery  [facility] [address]  [address] [place]  [address]  T - [phone]  F - [phone]  MUNIRA [email or web address]      CC: Martinez Arteaga MD  VIA In Basket     Ruiz Canchola MD  VIA In Basket     NATALY Pelaez CNP  VIA In Basket     Mandeep Park MD  VIA In Basket     Gato Wisdom, SANDY  [facility] [place]  [address] [facility] [address]  [place] [address]  VIA Facsimile: [phone]     Gloria Melissa MD  VIA In Basket     Vijaya Genao MD  VIA In Basket     Gina Ervin, MONICA  VIA In Basket       DX = photophobia

## 2019-02-19 NOTE — PROGRESS NOTES
"[facility]   Latasha Paul, NATALY CNP  02/19/2019      Chief Complaint:   Physical and Medication Request       History of Present Illness:   Dinora Mcghee is a 53 year old female with a history of pancreatitis disease s/p TPIAT, GERD, hypothyroidism, migraines, hyperlipidemia, choledocholithiasis and gastroparesis, who presents for an annual physical exam with iron and thyroid check.    The patient states that her other physicians would like her PCP to manage her synthroid, seasonal allergies, and plantar fascitis.    Migraines  40 mg QID Elavil with Zomig for breakthrough. The Zomig does not help significantly. She is working with Dr. Ingram, an ophthalmologist, on ways to avoid visual triggers. They discussed ways to avoid overhead fluorescent lighting and screens for her computer. She is also triggered by smells and noise.    Allergies  She takes Zyrtec for season allergies.    Plantar fascitis  She has left foot plantar fascitis. She treats it with an insert for her shoes and a boot to wear at night. She does ankle flexing exercises and wonders if there are any other suggestions for pain relief.    Vaginal dryness  She states that she experiences vaginal dryness, even with estradiol suppository use every third day. Without use, she says she has \"no moisture\" and has pain. She is concerned about the safety of long-term use, but does not wish to discontinue use.    Healthcare maintenance   Mammogram (females age 40 - 75): yearly or every 2 years? - Up to Date   Immunizations (Tetanus every 10 years, Influenza yearly, Pneumonia PPV-23 and PCV-13 age ? 65 or sooner if risk factors) - Recommended Shingrix    Review of Systems:   Pertinent items are noted in HPI and below, remainder of complete ROS is negative.    Answers for HPI/ROS submitted by the patient on 2/18/2019   General Symptoms: No  Skin Symptoms: No  HENT Symptoms: No  EYE SYMPTOMS: Yes  HEART SYMPTOMS: No  LUNG SYMPTOMS: " No  INTESTINAL SYMPTOMS: Yes  URINARY SYMPTOMS: No  GYNECOLOGIC SYMPTOMS: Yes  BREAST SYMPTOMS: No  SKELETAL SYMPTOMS: Yes  BLOOD SYMPTOMS: No  NERVOUS SYSTEM SYMPTOMS: Yes  MENTAL HEALTH SYMPTOMS: No  Eye pain: Yes  Vision loss: Yes  Dry eyes: Yes  Watery eyes: No  Eye bulging: No  Double vision: No  Flashing of lights: Yes  Spots: No  Floaters: No  Redness: No  Crossed eyes: No  Tunnel Vision: No  Yellowing of eyes: No  Eye irritation: Yes  Heart burn or indigestion: No  Nausea: Yes  Vomiting: Yes  Abdominal pain: Yes  Bloating: Yes  Constipation: No  Diarrhea: Yes  Blood in stool: No  Black stools: No  Rectal or Anal pain: No  Fecal incontinence: No  Yellowing of skin or eyes: No  Vomit with blood: No  Change in stools: No  Back pain: No  Muscle aches: No  Neck pain: No  Swollen joints: No  Joint pain: Yes  Bone pain: Yes  Muscle cramps: No  Muscle weakness: No  Joint stiffness: Yes  Bone fracture: No  Trouble with coordination: No  Dizziness or trouble with balance: No  Fainting or black-out spells: No  Memory loss: No  Headache: Yes  Seizures: No  Speech problems: No  Tingling: No  Tremor: No  Weakness: No  Difficulty walking: No  Paralysis: No  Numbness: No  Bleeding or spotting between periods: No  Heavy or painful periods: No  Irregular periods: No  Vaginal discharge: No  Hot flashes: Yes  Vaginal dryness: Yes  Genital ulcers: No  Reduced libido: Yes  Painful intercourse: Yes  Difficulty with sexual arousal: Yes  Post-menopausal bleeding: No    Active Medications:      acetaminophen 500 MG CAPS, Take 2 mg by mouth 2 times daily, Disp: , Rfl:      amitriptyline (ELAVIL) 10 MG tablet, Take 4 tablets (40 mg) by mouth At Bedtime Take 40 mg at bedtime, Disp: 120 tablet, Rfl: 11     amitriptyline (ELAVIL) 50 MG tablet, Take 1 tablet (50 mg) by mouth At Bedtime (Patient taking differently: Take 40 mg by mouth At Bedtime ), Disp: 60 tablet, Rfl: 3     amylase-lipase-protease (CREON 24) 90473-13916 units CPEP per EC  capsule, Take 3-4 capsules by mouth with meals and 1-2 with snacks. Maximum 15 capsules per day., Disp: 450 capsule, Rfl: 11     aspirin 81 MG EC tablet, Take 1 tablet (81 mg) by mouth daily, Disp: 90 tablet, Rfl: 3     azelastine (ASTELIN) 0.1 % spray, Spray 1 spray into both nostrils 2 times daily, Disp: , Rfl:      calcium carbonate-vitamin D (CALCIUM 500/D) 500-200 MG-UNIT tablet, , Disp: , Rfl:      cetirizine (ZYRTEC) 10 MG tablet, Take 10 mg by mouth daily, Disp: , Rfl:      diphenhydrAMINE (BENADRYL ALLERGY) 25 MG capsule, Take 1 capsule (25 mg) by mouth every 6 hours as needed for itching or allergies, Disp: 56 capsule, Rfl: 0     docusate sodium (COLACE) 100 MG capsule, Take 1 capsule (100 mg) by mouth 2 times daily as needed for constipation,, Disp: 60 capsule, Rfl: 0     estradiol (VAGIFEM) 10 MCG TABS vaginal tablet, , Disp: , Rfl:      fish oil-omega-3 fatty acids 1000 MG capsule, , Disp: , Rfl:      FOLIC ACID PO, Take 1 mg by mouth daily, Disp: , Rfl:      glucose 40 % GEL gel, Take 15-30 g by mouth every 15 minutes as needed for low blood sugar, Disp: 3 Tube, Rfl: 2     ibuprofen (ADVIL/MOTRIN) 400 MG tablet, Take 1 tablet (400 mg) by mouth every 6 hours as needed for moderate pain, Disp: 30 tablet, Rfl: 0     levothyroxine (SYNTHROID) 137 MCG tablet, Take 137 mcg by mouth daily, Disp: , Rfl:      lubiprostone (AMITIZA) 8 MCG capsule, Take 5 capsules (40 mcg) by mouth daily Take 3 capsules in AM and 2 capsules in PM, Disp: 150 capsule, Rfl: 3     magnesium oxide 200 MG TABS, , Disp: , Rfl:      multivitamin CF formula (CHOICEFUL) capsule, Take 1 tablet by mouth daily, Disp: , Rfl: 11     omeprazole (PRILOSEC) 40 MG capsule, Take 1 capsule (40 mg) by mouth daily, Disp: 90 capsule, Rfl: 3     prochlorperazine (COMPAZINE) 5 MG tablet, Take two 5 mg tablets by mouth every six hours as needed for nausea., Disp: 90 tablet, Rfl: 3     promethazine (PHENERGAN) 25 MG tablet, Take 1 tablet (25 mg) by mouth At  Bedtime, Disp: 60 tablet, Rfl: 3     ranitidine (ZANTAC) 150 MG tablet, Take 1 tablet (150 mg) by mouth At Bedtime, Disp: 90 tablet, Rfl: 3     senna-docusate (SENOKOT-S;PERICOLACE) 8.6-50 MG per tablet, Take 1-2 tablets by mouth 2 times daily, Disp: 100 tablet, Rfl: 0     ZOLMitriptan (ZOMIG) 5 MG tablet, Take 1 tablet (5 mg) by mouth at onset of headache for migraine, Disp: 9 tablet, Rfl: 3     ranitidine (ZANTAC) 150 MG tablet, Take 2 tablets (300 mg) by mouth daily (Patient not taking: Reported on 2/19/2019), Disp: 180 tablet, Rfl: 3      Component      Latest Ref Rng & Units 9/13/2018   IgG4      11 - 86 mg/dL 95 (H)     Component      Latest Ref Rng & Units 1/24/2019   Iron      35 - 180 ug/dL 61   Iron Binding Cap      240 - 430 ug/dL 260   Iron Saturation Index      15 - 46 % 24   Ferritin      8 - 252 ng/mL 17     03/23/2018  TSH .74     Allergies:   Apple; Depakote [divalproex sodium]; Zithromax [azithromycin dihydrate]; Darvocet [propoxyphene n-apap]; Morphine; Nalbuphine hcl; Zosyn [piperacillin-tazobactam in d5w]; Bactrim [sulfamethoxazole w-trimethoprim]; Cats; Compazine [prochlorperazine]; Corticosteroids; Dust mites; Grass; Prednisone; Ragweeds; Tape [adhesive tape]; Tramadol; Trees; Zofran [ondansetron]; and Flagyl [metronidazole]      Past Medical History:  Arthritis, left knee  Choledocholithiasis  Chronic abdominal pain  Chronic constipation  Chronic nausea  Chronic pancreatitis  Degeneration of lumbar or lumbosacral intervertebral disc  Hypothyroidism  Need for prophylactic immunotherapy  Sprain and strain of other specified sites of hip and thigh  Chondromalacia of patella  Sensorineural hearing loss  Vertiginous syndrome and labyrinthine disorder  Lumbago  Allergic rhinitis due to other allergen  GERD  Hiatal hernia  Pituitary adenoma s/p resection  Hypothyroidism  Mild hyperlipidemia  Presence of GJ tube  Pancreatic disease, stricture, 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  Postoperative  "nausea and vomiting  Portacath in place  Unspecified hearing loss, 25% high frequency right  Other type of intractable migraine  Post-pancreatectomy diabetes  Pancreatic insufficiency  Advance care planning  Gastroparesis  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  Incisional hernia, without obstruction or gangrene  Adhesive capsulitis of shoulder  Headache, chronic migraine without aura  Chronic pain syndrome    Past Surgical History:  Laparoscopic herniorrhaphy incisional  Excise lesion trunk  Laparoscopic pancreatectomy, transplant auto islet cell  Endoscopic ultrasound upper gastrointestinal tract  Inject transversus abdominis plane (tap) block bilateral  Endoscopic retrograde cholangiopancreatogram x4  Esophageal/gas reflux test with nasal imped >1 hr  EGD, combined x4  UGI endoscopy diagnostic with biopsy x2  Wrist antroscopy, release ligament  UGI endoscopy with esophageal dilation balloon <30 mm  Total abdominal hysterectomy  Abdomen surgery   section delivery x3  Laparoscopic pineda  Transphenoidal pituitary resection  Excise pituitary, transnasal/septal  Appendectomy    FH reviewed.    Immunizations: reviewed.    Social History:   The patient was alone.  Smoking Status: Former, .5 packs/day for 6 years, quit in   Smokeless Tobacco: Never   Alcohol Use: Yes, 1 - 2 Glasses of wine, 1 - 2 Cans of beer, 1 - 2 Shots of liquor per week      Physical Exam:   /82   Pulse 111   Ht 1.713 m (5' 7.44\")   Wt 69.9 kg (154 lb)   BMI 23.81 kg/m       Wt Readings from Last 1 Encounters:   19 69.9 kg (154 lb)     Constitutional: no distress, comfortable, pleasant   Eyes: anicteric  Neck: supple with full range of motion, no thyromegaly.   Cardiovascular: regular rate and rhythm, normal S1 and S2, no murmurs, rubs or gallops  Respiratory: clear to auscultation, no wheezes or crackles, normal breath sounds   Musculoskeletal: full range of motion, no edema.  Pain with palpation of the left plantar calcaneus " and plantar metatarsal joints. No swelling or areas of inflammation.  Skin: no concerning lesions, no jaundice, temp normal   Neurological: normal speech, no tremor. A and O x 3, good historian.  Psychological: appropriate mood, good eye contact, normal affect   Lymph: no  cervical,  supraclavicular, infraclavicular  nodes.    Assessment and Plan:  Hypothyroidism, unspecified type  Latest available TSH values measured were in March 2018.  - TSH    Vaginal dryness  We discussed the safety of continuing to use her estradiol vaginal suppository as needed, as the effects are local.    Ferritin and iron levels  We discussed the benefit of supplementing with 325 mg Ferrous sulfate a couple times a week with food and a glass of water to raise ferritin and iron levels and to help with energy levels.    Plantar fascitis  We discussed exercise techniques to try. The patient plans to ask Dr. Becerra about her ability to take NSAIDs    Total time spent 25 minutes.  More than 50% of the time spent with Ms. Mcghee on counseling / coordinating her care.    Latasha INGRAM CNP         Scribe Disclosure:   I, Husam Ferro, am serving as a scribe to document services personally performed by NATALY Somers CNP at this visit, based upon the provider's statements to me. All documentation has been reviewed by the aforementioned provider prior to being entered into the official medical record.     Portions of this medical record were completed by a scribe. UPON MY REVIEW AND AUTHENTICATION BY ELECTRONIC SIGNATURE, this confirms (a) I performed the applicable clinical services, and (b) the record is accurate.

## 2019-02-22 LAB
A-LINOLENATE SERPL-SCNC: 70 NMOL/ML (ref 50–130)
AA SERPL-SCNC: 597 NMOL/ML (ref 520–1490)
ARACHIDATE SERPL-SCNC: 32 NMOL/ML (ref 50–90)
CLINICAL BIOCHEMIST REVIEW: ABNORMAL
DHA SERPL-SCNC: 146 NMOL/ML (ref 30–250)
DOCOSAPENTAENATE W6 SERPL-SCNC: 26 NMOL/ML (ref 10–70)
DOCOSATETRAENOATE SERPL-SCNC: 28 NMOL/ML (ref 10–80)
DOCOSENOATE SERPL-SCNC: 5 NMOL/ML (ref 4–13)
DPA SERPL-SCNC: 74 NMOL/ML (ref 20–210)
EPA SERPL-SCNC: 136 NMOL/ML (ref 14–100)
FA SERPL-SCNC: 10.1 MMOL/L (ref 7.3–16.8)
G-LINOLENATE SERPL-SCNC: 45 NMOL/ML (ref 16–150)
HEXADECENOATE SERPL-SCNC: 25 NMOL/ML (ref 25–105)
HOMO-G LINOLENATE SERPL-SCNC: 148 NMOL/ML (ref 50–250)
LAURATE SERPL-SCNC: 23 NMOL/ML (ref 6–90)
LINOLEATE SERPL-SCNC: 2846 NMOL/ML (ref 2270–3850)
MEAD ACID SERPL-SCNC: 21 NMOL/ML (ref 7–30)
MONOUNSAT FA SERPL-SCNC: 2.4 MMOL/L (ref 1.3–5.8)
MYRISTATE SERPL-SCNC: 136 NMOL/ML (ref 30–450)
NERVONATE SERPL-SCNC: 94 NMOL/ML (ref 60–100)
OCTADECANOATE SERPL-SCNC: 1006 NMOL/ML (ref 590–1170)
OLEATE SERPL-SCNC: 1834 NMOL/ML (ref 650–3500)
PALMITATE SERPL-SCNC: 2210 NMOL/ML (ref 1480–3730)
PALMITOLEATE SERPL-SCNC: 226 NMOL/ML (ref 110–1130)
POLYUNSAT FA SERPL-SCNC: 4.1 MMOL/L (ref 3.2–5.8)
SAT FA SERPL-SCNC: 3.6 MMOL/L (ref 2.5–5.5)
TRIENOATE/AA SERPL-SRTO: 0.04 {RATIO} (ref 0.01–0.04)
VACCENATE SERPL-SCNC: 194 NMOL/ML (ref 280–740)
W3 FA SERPL-SCNC: 0.4 MMOL/L (ref 0.2–0.5)
W6 FA SERPL-SCNC: 3.7 MMOL/L (ref 3–5.4)

## 2019-03-07 ENCOUNTER — MYC REFILL (OUTPATIENT)
Dept: GASTROENTEROLOGY | Facility: CLINIC | Age: 54
End: 2019-03-07

## 2019-03-07 DIAGNOSIS — K59.09 OTHER CONSTIPATION: ICD-10-CM

## 2019-03-14 ENCOUNTER — TELEPHONE (OUTPATIENT)
Dept: GASTROENTEROLOGY | Facility: CLINIC | Age: 54
End: 2019-03-14

## 2019-03-14 NOTE — TELEPHONE ENCOUNTER
Central Prior Authorization Team   Phone: [phone]      PA Initiation    Medication: lubiprostone (AMITIZA) 8 MCG capsule-PA Pending  Insurance Company: [employer] [place] - Phone [phone] Fax [phone]  Pharmacy Filling the Rx: [facility] - [place], [place] - [address]  Filling Pharmacy Phone: [phone]  Filling Pharmacy Fax: [phone]  Start Date: 3/14/2019

## 2019-03-14 NOTE — TELEPHONE ENCOUNTER
Prior Authorization Specialty Medication Request    Medication/Dose:   lubiprostone (AMITIZA) 8 MCG capsule 150 capsule 3 11/28/2018  No   Sig - Route: Take 5 capsules (40 mcg) by mouth daily Take 3 capsules in AM and 2 capsules in PM - Oral         THIS IS NOT A NEW RX      ICD code (if different than what is on RX):    Previously Tried and Failed:  Currently on amitiza     Important Lab Values:   Rationale: to have formed bowel movements followed by 1 day a week in which she will have multiple loose and urgent bowel movements.  Symptoms have improved from last visit and she continues with and Amitiza 5 tablets a day and Colace.  She has been able to cut back on her senna and is not taking on a regular basis      Insurance Name:   Coverage information:     Subscriber: [id number] HUDSON DAVIS     Rel to sub: 02 - Spouse     Member ID: [id number]     Payor: [employer] Ph: [phone]     Benefit plan: [id number] [place] Ph: [phone]     Group number: [id number]     Member effective dates: from 01/01/17        Insurance ID:   Insurance Phone Number:     Pharmacy Information (if different than what is on RX)  Name:    Phone:

## 2019-03-15 NOTE — TELEPHONE ENCOUNTER
PRIOR AUTHORIZATION DENIED    Medication: lubiprostone (AMITIZA) 8 MCG capsule-DENIED    Denial Date: 3/15/2019    Denial Rational:              Appeal Information:

## 2019-03-22 ENCOUNTER — MYC MEDICAL ADVICE (OUTPATIENT)
Dept: GASTROENTEROLOGY | Facility: CLINIC | Age: 54
End: 2019-03-22

## 2019-03-22 ENCOUNTER — TELEPHONE (OUTPATIENT)
Dept: GASTROENTEROLOGY | Facility: CLINIC | Age: 54
End: 2019-03-22

## 2019-03-22 DIAGNOSIS — D50.9 IRON DEFICIENCY ANEMIA: ICD-10-CM

## 2019-03-22 NOTE — TELEPHONE ENCOUNTER
PA Initiation    Medication: lubiprostone (AMITIZA) 8 MCG capsule  Insurance Company: [employer] [place] - Phone [phone] Fax [phone]  Pharmacy Filling the Rx: [facility] - [place], [place] - [facility] [address]  Filling Pharmacy Phone: [phone]  Filling Pharmacy Fax: [phone]  Start Date: 3/22/2019

## 2019-03-22 NOTE — PROGRESS NOTES
Order for two doses of iron placed for patient per Rosanne Marti Request. Rosanne called the patient and updated her on the plan of care and gave the patient the phone number to call and schedule the injections.

## 2019-03-22 NOTE — TELEPHONE ENCOUNTER
Health Call Center    Phone Message    May a detailed message be left on voicemail: yes    Reason for Call: Other: PT:  Pt requesting a call back from Figueroa MAYER, please advise. Per pt OK to lm on her phone     Action Taken: Message routed to:  [facility] GI     Patient called. We discussed that Amitiza has been approved, and she should be able to refill prescriptions. Will plan for 2 Injectefer infusions (followed by checking her levels) as her ferritin is borderline low and this may be contributing to symptoms of restless legs syndrome. Patient reports increased muscle cramping lately, unlikely related to GI and can discuss with her PCP.    Rosanne Marti PA-C  Division of Gastroenterology, Hepatology & Nutrition  [facility]

## 2019-03-22 NOTE — TELEPHONE ENCOUNTER
Prior Authorization Approval    Authorization Effective Date: 4/10/2019  Authorization Expiration Date: 4/10/2020  Medication: lubiprostone (AMITIZA) 8 MCG capsule - Approved  Approved Dose/Quantity:  150/30 DAYS  Reference #:  [id number]   Insurance Company: [employer] [place] - Phone [phone] Fax [phone]  Expected CoPay: $92     CoPay Card Available:      Foundation Assistance Needed:    Which Pharmacy is filling the prescription (Not needed for infusion/clinic administered): [facility] - [place], [place] - [address]  Pharmacy Notified: No  Patient Notified:  No

## 2019-03-22 NOTE — TELEPHONE ENCOUNTER
Patient called and voicemail left instructing her to check her MyChart message. She was also given our clinic number, and can call if she has further questions.     Rosanne Marti PA-C  Division of Gastroenterology, Hepatology & Nutrition  [facility]

## 2019-03-27 RX ORDER — LUBIPROSTONE 8 UG/1
5 CAPSULE ORAL DAILY
Qty: 150 CAPSULE | Refills: 3 | Status: SHIPPED | OUTPATIENT
Start: 2019-03-27 | End: 2019-07-02

## 2019-03-29 ENCOUNTER — INFUSION THERAPY VISIT (OUTPATIENT)
Dept: INFUSION THERAPY | Facility: CLINIC | Age: 54
End: 2019-03-29
Attending: PHYSICIAN ASSISTANT
Payer: COMMERCIAL

## 2019-03-29 VITALS
DIASTOLIC BLOOD PRESSURE: 77 MMHG | HEART RATE: 78 BPM | TEMPERATURE: 98.2 F | SYSTOLIC BLOOD PRESSURE: 117 MMHG | RESPIRATION RATE: 16 BRPM

## 2019-03-29 DIAGNOSIS — D50.9 IRON DEFICIENCY ANEMIA: Primary | ICD-10-CM

## 2019-03-29 PROCEDURE — 25000128 H RX IP 250 OP 636: Mod: ZF | Performed by: PHYSICIAN ASSISTANT

## 2019-03-29 PROCEDURE — 25800030 ZZH RX IP 258 OP 636: Mod: ZF | Performed by: PHYSICIAN ASSISTANT

## 2019-03-29 PROCEDURE — 96365 THER/PROPH/DIAG IV INF INIT: CPT

## 2019-03-29 RX ADMIN — FERRIC CARBOXYMALTOSE INJECTION 750 MG: 50 INJECTION, SOLUTION INTRAVENOUS at 12:45

## 2019-03-29 NOTE — PATIENT INSTRUCTIONS
Dear Dinora Mcghee    Thank you for choosing [facility] ([facility]) for your infusion.  The following information is a summary of our appointment as well as important reminders.          We look forward in seeing you on your next appointment here at [facility].  Please don t hesitate to call us at [phone] to reschedule any of your appointments or to speak with one of the [facility] registered nurses.  It was a pleasure taking care of you today.    Sincerely,    [facility]  [facility]  [address] [place] [address]  [id number]  Phone:  [phone]

## 2019-03-29 NOTE — PROGRESS NOTES
Nursing Note  Dinora Mcghee [facility] for:   Chief Complaint   Patient presents with     Infusion     Injectafer infusion     During today's [facility] appointment, orders from Rosanne Marti PA-C were completed.  Frequency: today is dose 1 of 2 total.    Progress note:  Patient identification verified by name and date of birth.  Assessment completed.  Vitals recorded in Doc Flowsheets.  Patient was provided with education regarding infusion and possible side effects.  Patient verbalized understanding.     present during visit today: Not Applicable.    Premedications: were not ordered.    Infusion length and rate:  500 ml/hr.  15 minutes    Pt monitored for 30 minutes post infusion.    Labs: were not ordered for this appointment.    Vascular access: peripheral IV placed today.    Treatment Conditions: non-applicable.    Post Infusion Assessment:  Patient tolerated infusion without incident.     Administrations This Visit     ferric carboxymaltose (INJECTAFER) 750 mg in sodium chloride 0.9 % 100 mL intermittent infusion     Admin Date  03/29/2019 Action  New Bag Dose  750 mg Rate  500 mL/hr Route  Intravenous Administered By  Magi Krueger RN                  Discharge Plan:   Follow up plan of care with: ongoing infusions at [facility].  Discharge instructions were reviewed with patient.  Patient/representative verbalized understanding of discharge instructions and all questions answered.  Patient discharged from [facility] in stable condition.    Magi Krueger RN        /76 (BP Location: Left arm)   Pulse 84   Temp 98.2  F (36.8  C) (Oral)   Resp 16

## 2019-04-05 ENCOUNTER — INFUSION THERAPY VISIT (OUTPATIENT)
Dept: INFUSION THERAPY | Facility: CLINIC | Age: 54
End: 2019-04-05
Attending: PHYSICIAN ASSISTANT
Payer: COMMERCIAL

## 2019-04-05 ENCOUNTER — APPOINTMENT (OUTPATIENT)
Dept: LAB | Facility: CLINIC | Age: 54
End: 2019-04-05
Attending: INTERNAL MEDICINE
Payer: COMMERCIAL

## 2019-04-05 VITALS
WEIGHT: 156 LBS | BODY MASS INDEX: 24.11 KG/M2 | RESPIRATION RATE: 16 BRPM | DIASTOLIC BLOOD PRESSURE: 82 MMHG | TEMPERATURE: 97.9 F | SYSTOLIC BLOOD PRESSURE: 117 MMHG | OXYGEN SATURATION: 100 % | HEART RATE: 74 BPM

## 2019-04-05 DIAGNOSIS — D50.9 IRON DEFICIENCY ANEMIA: ICD-10-CM

## 2019-04-05 DIAGNOSIS — D89.84 IGG4-RELATED SCLEROSING DISEASE (H): Primary | ICD-10-CM

## 2019-04-05 LAB
ALBUMIN SERPL-MCNC: 3.9 G/DL (ref 3.4–5)
ALP SERPL-CCNC: 121 U/L (ref 40–150)
ALT SERPL W P-5'-P-CCNC: 59 U/L (ref 0–50)
ANION GAP SERPL CALCULATED.3IONS-SCNC: 6 MMOL/L (ref 3–14)
AST SERPL W P-5'-P-CCNC: 46 U/L (ref 0–45)
BASOPHILS # BLD AUTO: 0.1 10E9/L (ref 0–0.2)
BASOPHILS NFR BLD AUTO: 1.6 %
BILIRUB SERPL-MCNC: 0.8 MG/DL (ref 0.2–1.3)
BUN SERPL-MCNC: 17 MG/DL (ref 7–30)
CALCIUM SERPL-MCNC: 8.8 MG/DL (ref 8.5–10.1)
CHLORIDE SERPL-SCNC: 106 MMOL/L (ref 94–109)
CO2 SERPL-SCNC: 26 MMOL/L (ref 20–32)
CREAT SERPL-MCNC: 0.62 MG/DL (ref 0.52–1.04)
DIFFERENTIAL METHOD BLD: NORMAL
EOSINOPHIL # BLD AUTO: 0.1 10E9/L (ref 0–0.7)
EOSINOPHIL NFR BLD AUTO: 2.3 %
ERYTHROCYTE [DISTWIDTH] IN BLOOD BY AUTOMATED COUNT: 14.1 % (ref 10–15)
GFR SERPL CREATININE-BSD FRML MDRD: >90 ML/MIN/{1.73_M2}
GLUCOSE SERPL-MCNC: 142 MG/DL (ref 70–99)
HCT VFR BLD AUTO: 42.4 % (ref 35–47)
HGB BLD-MCNC: 14 G/DL (ref 11.7–15.7)
IMM GRANULOCYTES # BLD: 0 10E9/L (ref 0–0.4)
IMM GRANULOCYTES NFR BLD: 0.2 %
LYMPHOCYTES # BLD AUTO: 2.7 10E9/L (ref 0.8–5.3)
LYMPHOCYTES NFR BLD AUTO: 44.4 %
MCH RBC QN AUTO: 31 PG (ref 26.5–33)
MCHC RBC AUTO-ENTMCNC: 33 G/DL (ref 31.5–36.5)
MCV RBC AUTO: 94 FL (ref 78–100)
MONOCYTES # BLD AUTO: 0.7 10E9/L (ref 0–1.3)
MONOCYTES NFR BLD AUTO: 11.5 %
NEUTROPHILS # BLD AUTO: 2.5 10E9/L (ref 1.6–8.3)
NEUTROPHILS NFR BLD AUTO: 40 %
NRBC # BLD AUTO: 0 10*3/UL
NRBC BLD AUTO-RTO: 0 /100
PLATELET # BLD AUTO: 377 10E9/L (ref 150–450)
POTASSIUM SERPL-SCNC: 3.8 MMOL/L (ref 3.4–5.3)
PROT SERPL-MCNC: 7.6 G/DL (ref 6.8–8.8)
RBC # BLD AUTO: 4.52 10E12/L (ref 3.8–5.2)
SODIUM SERPL-SCNC: 137 MMOL/L (ref 133–144)
WBC # BLD AUTO: 6.2 10E9/L (ref 4–11)

## 2019-04-05 PROCEDURE — 80053 COMPREHEN METABOLIC PANEL: CPT | Performed by: INTERNAL MEDICINE

## 2019-04-05 PROCEDURE — 85025 COMPLETE CBC W/AUTO DIFF WBC: CPT | Performed by: INTERNAL MEDICINE

## 2019-04-05 PROCEDURE — 82784 ASSAY IGA/IGD/IGG/IGM EACH: CPT | Performed by: INTERNAL MEDICINE

## 2019-04-05 PROCEDURE — 25000128 H RX IP 250 OP 636: Mod: ZF | Performed by: PHYSICIAN ASSISTANT

## 2019-04-05 PROCEDURE — 82787 IGG 1 2 3 OR 4 EACH: CPT | Performed by: INTERNAL MEDICINE

## 2019-04-05 PROCEDURE — 25800030 ZZH RX IP 258 OP 636: Mod: ZF | Performed by: PHYSICIAN ASSISTANT

## 2019-04-05 PROCEDURE — 96365 THER/PROPH/DIAG IV INF INIT: CPT

## 2019-04-05 RX ADMIN — FERRIC CARBOXYMALTOSE INJECTION 750 MG: 50 INJECTION, SOLUTION INTRAVENOUS at 13:42

## 2019-04-05 ASSESSMENT — PAIN SCALES - GENERAL: PAINLEVEL: NO PAIN (0)

## 2019-04-05 NOTE — NURSING NOTE
Chief Complaint   Patient presents with     Blood Draw     Labs drawn via PIV placed by RN in lab. VS taken. Pt checked into next appt.     Labs drawn via PIV placed by RN in lab. Line flushed and locked with saline. VS taken. Patient checked into next appointment.    Chikis Young RN

## 2019-04-05 NOTE — PROGRESS NOTES
Nursing Note  Dinora Mcghee presents [facility] for:   Chief Complaint   Patient presents with     Blood Draw     Labs drawn via PIV placed by RN in lab. VS taken. Pt checked into next appt.     Infusion     IV Injectafer     During today's [facility] appointment, orders from Rosanne Marti PA-C were completed.  Frequency: today is dose 2 of 2 total.    Progress note:  Patient identification verified by name and date of birth.  Assessment completed.  Vitals recorded in Doc Flowsheets.  Patient was provided with education regarding infusion and possible side effects.  Patient verbalized understanding.     present during visit today: Not Applicable.    Premedications: were not ordered.    Infusion length and rate:  infusion given over approximately 20 minutes.    Labs: were not ordered for this appointment.    Vascular access: peripheral IV placed today by lab prior to [facility] appointment.    Treatment Conditions: patient monitored for 30 minutes after medication was infused.    Post Infusion Assessment:  Patient tolerated infusion without incident.  Blood return noted pre and post infusion.  Site patent and intact, free from redness, edema or discomfort.  No evidence of extravasations.  Access discontinued per protocol.     Discharge Plan:   Follow up plan of care with: Rosanne Marti PA-C.  Discharge instructions were reviewed with patient.  Patient/representative verbalized understanding of discharge instructions and all questions answered.  Patient discharged from [facility] in stable condition.    Isaac Ramesh RN    Administrations This Visit     ferric carboxymaltose (INJECTAFER) 750 mg in sodium chloride 0.9 % 100 mL intermittent infusion     Admin Date  04/05/2019 Action  New Bag Dose  750 mg Route  Intravenous Administered By  Isaac Ramesh RN                /80 (BP Location:  Left arm)   Pulse 86   Temp 98.3  F (36.8  C) (Oral)   Resp 16   Wt 70.8 kg (156 lb)   SpO2 99%   BMI 24.11 kg/m

## 2019-04-05 NOTE — LETTER
Patient:  Dinora Mcghee  :   1965  MRN:     [id number]        Ms.Elaine Racquel Mcghee  [address] [facility] [address]        2019    Dear ,    We are writing to inform you of your test results. Overall stable labs. Recommend re-check in 2 months.      Results for orders placed or performed in visit on 19   Immunoglobulin G subclasses   Result Value Ref Range    IGG 1,060 695 - 1,620 mg/dL    IgG1 449 300 - 856 mg/dL    IgG2 388 158 - 761 mg/dL    IgG3 77 24 - 192 mg/dL    IgG4 104 (H) 11 - 86 mg/dL   CBC with platelets differential   Result Value Ref Range    WBC 6.2 4.0 - 11.0 10e9/L    RBC Count 4.52 3.8 - 5.2 10e12/L    Hemoglobin 14.0 11.7 - 15.7 g/dL    Hematocrit 42.4 35.0 - 47.0 %    MCV 94 78 - 100 fl    MCH 31.0 26.5 - 33.0 pg    MCHC 33.0 31.5 - 36.5 g/dL    RDW 14.1 10.0 - 15.0 %    Platelet Count 377 150 - 450 10e9/L    Diff Method Automated Method     % Neutrophils 40.0 %    % Lymphocytes 44.4 %    % Monocytes 11.5 %    % Eosinophils 2.3 %    % Basophils 1.6 %    % Immature Granulocytes 0.2 %    Nucleated RBCs 0 0 /100    Absolute Neutrophil 2.5 1.6 - 8.3 10e9/L    Absolute Lymphocytes 2.7 0.8 - 5.3 10e9/L    Absolute Monocytes 0.7 0.0 - 1.3 10e9/L    Absolute Eosinophils 0.1 0.0 - 0.7 10e9/L    Absolute Basophils 0.1 0.0 - 0.2 10e9/L    Abs Immature Granulocytes 0.0 0 - 0.4 10e9/L    Absolute Nucleated RBC 0.0    Comprehensive metabolic panel   Result Value Ref Range    Sodium 137 133 - 144 mmol/L    Potassium 3.8 3.4 - 5.3 mmol/L    Chloride 106 94 - 109 mmol/L    Carbon Dioxide 26 20 - 32 mmol/L    Anion Gap 6 3 - 14 mmol/L    Glucose 142 (H) 70 - 99 mg/dL    Urea Nitrogen 17 7 - 30 mg/dL    Creatinine 0.62 0.52 - 1.04 mg/dL    GFR Estimate >90 >60 mL/min/[1.73_m2]    GFR Estimate If Black >90 >60 mL/min/[1.73_m2]    Calcium 8.8 8.5 - 10.1 mg/dL    Bilirubin Total 0.8 0.2 - 1.3 mg/dL    Albumin 3.9 3.4 - 5.0 g/dL    Protein Total 7.6 6.8 - 8.8 g/dL     Alkaline Phosphatase 121 40 - 150 U/L    ALT 59 (H) 0 - 50 U/L    AST 46 (H) 0 - 45 U/L     *Note: Due to a large number of results and/or encounters for the requested time period, some results have not been displayed. A complete set of results can be found in Results Review.       Aniat Becerra MD

## 2019-04-09 DIAGNOSIS — R76.8 IGG4 SELECTIVELY HIGH IN PLASMA: Primary | ICD-10-CM

## 2019-04-09 LAB
IGG SERPL-MCNC: 1060 MG/DL (ref 695–1620)
IGG1 SER-MCNC: 449 MG/DL (ref 300–856)
IGG2 SER-MCNC: 388 MG/DL (ref 158–761)
IGG3 SER-MCNC: 77 MG/DL (ref 24–192)
IGG4 SER-MCNC: 104 MG/DL (ref 11–86)

## 2019-06-23 DIAGNOSIS — R11.0 NAUSEA: ICD-10-CM

## 2019-06-25 DIAGNOSIS — R76.8 IGG4 SELECTIVELY HIGH IN PLASMA: ICD-10-CM

## 2019-06-25 LAB
ALBUMIN SERPL-MCNC: 4 G/DL (ref 3.4–5)
ALP SERPL-CCNC: 123 U/L (ref 40–150)
ALT SERPL W P-5'-P-CCNC: 65 U/L (ref 0–50)
AST SERPL W P-5'-P-CCNC: 56 U/L (ref 0–45)
BILIRUB DIRECT SERPL-MCNC: 0.2 MG/DL (ref 0–0.2)
BILIRUB SERPL-MCNC: 0.7 MG/DL (ref 0.2–1.3)
PROT SERPL-MCNC: 7.4 G/DL (ref 6.8–8.8)

## 2019-06-25 NOTE — LETTER
Patient:  Dinora Mcghee  :   1965  MRN:     [id number]        Ms.Elaine Racquel Mcghee  [address] [facility] [address]        2019    Dear ,    We are writing to inform you of your test results. Slightly elevated liver tests but IgG-4 level improved.      Results for orders placed or performed in visit on 19   Immunoglobulin G subclasses   Result Value Ref Range     695 - 1,620 mg/dL    IgG1 417 300 - 856 mg/dL    IgG2 364 158 - 761 mg/dL    IgG3 67 24 - 192 mg/dL    IgG4 89 (H) 11 - 86 mg/dL   Hepatic panel   Result Value Ref Range    Bilirubin Direct 0.2 0.0 - 0.2 mg/dL    Bilirubin Total 0.7 0.2 - 1.3 mg/dL    Albumin 4.0 3.4 - 5.0 g/dL    Protein Total 7.4 6.8 - 8.8 g/dL    Alkaline Phosphatase 123 40 - 150 U/L    ALT 65 (H) 0 - 50 U/L    AST 56 (H) 0 - 45 U/L     *Note: Due to a large number of results and/or encounters for the requested time period, some results have not been displayed. A complete set of results can be found in Results Review.       Anita Becerra MD

## 2019-06-27 RX ORDER — PROMETHAZINE HYDROCHLORIDE 25 MG/1
25 TABLET ORAL AT BEDTIME
Qty: 90 TABLET | Refills: 1 | Status: CANCELLED | OUTPATIENT
Start: 2019-06-27

## 2019-06-27 NOTE — TELEPHONE ENCOUNTER
promethazine (PHENERGAN) 25 MG tablet   Last Written Prescription Date:  11/21/18  Last Fill Quantity: 60,   # refills: 3  Last Office Visit : 2/19/19  Future Office visit:  7/2/19      Routing refill request to provider for review/approval because:  Not on protocol

## 2019-06-28 ENCOUNTER — TELEPHONE (OUTPATIENT)
Dept: GASTROENTEROLOGY | Facility: CLINIC | Age: 54
End: 2019-06-28

## 2019-06-28 LAB
IGG SERPL-MCNC: 963 MG/DL (ref 695–1620)
IGG1 SER-MCNC: 417 MG/DL (ref 300–856)
IGG2 SER-MCNC: 364 MG/DL (ref 158–761)
IGG3 SER-MCNC: 67 MG/DL (ref 24–192)
IGG4 SER-MCNC: 89 MG/DL (ref 11–86)

## 2019-07-01 ASSESSMENT — ENCOUNTER SYMPTOMS
RECTAL PAIN: 0
BACK PAIN: 1
HEARTBURN: 1
NIGHT SWEATS: 1
HOARSE VOICE: 1
MUSCLE WEAKNESS: 0
FEVER: 0
WEIGHT GAIN: 0
VOMITING: 1
SINUS PAIN: 0
INCREASED ENERGY: 1
MUSCLE CRAMPS: 1
TROUBLE SWALLOWING: 0
DIARRHEA: 1
SORE THROAT: 0
TASTE DISTURBANCE: 0
MYALGIAS: 1
BLOATING: 1
HALLUCINATIONS: 0
NECK MASS: 0
NAUSEA: 1
JOINT SWELLING: 0
BOWEL INCONTINENCE: 0
WEIGHT LOSS: 0
CHILLS: 0
ABDOMINAL PAIN: 1
CONSTIPATION: 0
BLOOD IN STOOL: 0
SINUS CONGESTION: 1
JAUNDICE: 0
FATIGUE: 1
NECK PAIN: 0
SMELL DISTURBANCE: 0
STIFFNESS: 1
POLYDIPSIA: 0
ARTHRALGIAS: 0
ALTERED TEMPERATURE REGULATION: 0
POLYPHAGIA: 0
DECREASED APPETITE: 1

## 2019-07-01 ASSESSMENT — PATIENT HEALTH QUESTIONNAIRE - PHQ9
SUM OF ALL RESPONSES TO PHQ QUESTIONS 1-9: 3
SUM OF ALL RESPONSES TO PHQ QUESTIONS 1-9: 3
10. IF YOU CHECKED OFF ANY PROBLEMS, HOW DIFFICULT HAVE THESE PROBLEMS MADE IT FOR YOU TO DO YOUR WORK, TAKE CARE OF THINGS AT HOME, OR GET ALONG WITH OTHER PEOPLE: NOT DIFFICULT AT ALL

## 2019-07-02 ENCOUNTER — OFFICE VISIT (OUTPATIENT)
Dept: GASTROENTEROLOGY | Facility: CLINIC | Age: 54
End: 2019-07-02
Payer: COMMERCIAL

## 2019-07-02 VITALS
DIASTOLIC BLOOD PRESSURE: 67 MMHG | HEART RATE: 96 BPM | OXYGEN SATURATION: 97 % | BODY MASS INDEX: 24.63 KG/M2 | HEIGHT: 67 IN | TEMPERATURE: 98.5 F | WEIGHT: 156.9 LBS | SYSTOLIC BLOOD PRESSURE: 101 MMHG

## 2019-07-02 DIAGNOSIS — Z90.410 HISTORY OF PANCREATECTOMY: ICD-10-CM

## 2019-07-02 DIAGNOSIS — K86.89 PANCREATIC INSUFFICIENCY: ICD-10-CM

## 2019-07-02 DIAGNOSIS — K59.09 OTHER CONSTIPATION: ICD-10-CM

## 2019-07-02 DIAGNOSIS — L29.9 ITCHING: ICD-10-CM

## 2019-07-02 DIAGNOSIS — R11.0 NAUSEA: ICD-10-CM

## 2019-07-02 DIAGNOSIS — E13.9 POST-PANCREATECTOMY DIABETES (H): ICD-10-CM

## 2019-07-02 DIAGNOSIS — Z90.410 POST-PANCREATECTOMY DIABETES (H): ICD-10-CM

## 2019-07-02 DIAGNOSIS — E89.1 POST-PANCREATECTOMY DIABETES (H): ICD-10-CM

## 2019-07-02 RX ORDER — LUBIPROSTONE 8 UG/1
5 CAPSULE ORAL DAILY
Qty: 150 CAPSULE | Refills: 3 | Status: SHIPPED | OUTPATIENT
Start: 2019-07-02 | End: 2019-12-06

## 2019-07-02 RX ORDER — PROMETHAZINE HYDROCHLORIDE 25 MG/1
25 TABLET ORAL AT BEDTIME
Qty: 60 TABLET | Refills: 3 | Status: SHIPPED | OUTPATIENT
Start: 2019-07-02 | End: 2020-07-10

## 2019-07-02 RX ORDER — PROCHLORPERAZINE MALEATE 5 MG
TABLET ORAL
Qty: 90 TABLET | Refills: 3 | Status: SHIPPED | OUTPATIENT
Start: 2019-07-02 | End: 2020-07-30

## 2019-07-02 ASSESSMENT — PAIN SCALES - GENERAL: PAINLEVEL: NO PAIN (0)

## 2019-07-02 ASSESSMENT — MIFFLIN-ST. JEOR: SCORE: 1355.67

## 2019-07-02 ASSESSMENT — PATIENT HEALTH QUESTIONNAIRE - PHQ9: SUM OF ALL RESPONSES TO PHQ QUESTIONS 1-9: 3

## 2019-07-02 NOTE — PROGRESS NOTES
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     1.  Constipation, nausea, vomiting,   Patient reports she has not had symptoms of constipation and will have regular soft bowel movements 6 days a week, with continued episodes of loose stools one day a week. May represent continued constipation with over-flow diarrhea, food insensitivity, or IBS. Discussed that she should track symptoms on a blank calender to try and identify particular food/life triggers of symptoms. She continues to take Amitiza 5 tablets a day, Colace, magnesium oxide 200 mg, in addition to Creon (his discontinued senna)-- was recommended to continue this bowel regimen at this time.Can also try OTC IbGard for abdominal discomfort/cramping.     If worsening constipation, can increase magnesium oxide or do partial clean-out with magnesium citrate.     Upper GI symptoms of nausea most recently well controlled with Phenergan at night, and Compazine as needed. Patient should take anti-nausea medications sparingly due to increased risk of constipation. GERD is well managed with Zantac 150 daily and Prilosec 40 mg daily before at night which she can continue at this point. If her symptoms are controlled in the future, can consider decreasing Prilosec to 20 mg.     -- could consider trying IBGard for cramping (while monitoring GERD symptoms)  -- track symptoms and possible triggers   -- magnesium oxide 200 mg daily   -- continue Amitiza, colace as you have been   -- continue  zantac 150 mg daily, Prilosec 40 mg daily 3-60 minutes before eating  -- continue phenergan at night as needed for nausea  -- let us know if your symptoms change or worsen    RTC 4-6 months     Thank you for this consultation. Patient discussed in brief with Dr. Genao. It was a pleasure to participate in the care of this patient; please contact us with any further questions.     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     Summarized from previous documentation with Dr. Genao and I, please see previous notes for further details     Gastroparesis  Gastric emptying study with 2-hour study at [facility] have been consistent with gastroparesis, patient had follow-up study here 6/27/2016 with 49% remaining at 4 hours however the patient was not aware she was on narcotics at the time.  Patient had previous GJ tubes in fall 2016, NG tube used for venting with tube feeds and was able to wean off of tube feeds after TPA T.  At initial visit she reported severe migraine headaches associated with vomiting and is on amitriptyline for migraine prophylaxis.  Patient has used several medication for the symptoms including scopolamine patch, pro-chloro para Rufino, promethazine which helped symptoms.  She has also been seen by neurology and psychology due to concern that she has a difficult time sleeping and with migraines.  At her last visit, patient had been working with dietition to increase caloric intake and was maintaining weight around 150 lbs.  She reported occasional nausea without vomiting, with 1 episode in the last month. Symptoms may have been worsened due to anxiety regarding surgery and ativan had helped.  At the patient's last visit:  the patient reports that since taking the Phenergan at night, her nausea and vomiting has improved significantly and she is not waking up as often.  She does report an episode around the holidays in which he vomited 5-6 times however thinks this may be due to a GI bug.  Now she reports that she has nausea about 2 times a week depending on the week.  Is also taking Compazine as needed and no longer taking Zofran.  She also understands that blood sugar may play a role in her nausea therefore she will check her blood sugar when nauseous.    Today: The patient notes improvement in her symptoms if she waits to eat breakfast until 10-11 in the morning. She continues to take compazine in the morning, and phenergan at night. Still will experience vomiting, especially if she does not take phenergan at night. Nausea/vomiting worsens with migraines which have improved while avoiding gluten and increasing fruits and vegetables (2 days a month instead of 4 times a week). Also has cut back on meat and aims to eat more chicken and fish.     Constipation/irregular bowel movements/bloating  Patient reports history of ongoing constipation for over 20 years after having her first child.  She has tried multiple interventions including a bowel cleanout, MiraLAX, senna, milk of magnesium, Linzess, and Amitiza for which she was on when she first came to [facility] and  GI clinic.  Patient also takes creon.  Patient has done a course of rifaximin in the past with with improvement in bloating and stool consistency.  Patient has also been on specific SIBO/FODMAP diets to the [facility] while avoiding certain food triggers. At the patient's last visit: the patient notes that she continues to have occasional watery stools which she describes occurs  "on \"clearing days \".  This will happen every couple of days in which she describes urgent bowel movements with a large amount of stool requiring multiple trips to the bathroom.  When this occurs, she feels as though it has drained her energy.  Will also have abdominal discomfort associated with this which is slightly alleviated with a heating pad.  On other days, she will have a more solid bowel movement which she describes as a coil.  She plans to reschedule around having a bowel movement and her daily routine that involves exercise, drinking hot water, and guided imagery in order to have 1-2 satisfying bowel movements.  Continues to follow low FODMAP diet.  She continues to take amities of 5 tablets a day, occasional senna, and daily Colace. On average, the patient will take 12-15 creon tablets a day.     Today she reports she typically has 2 bowel movements a day and denies significant constipation. Has cut out Senna, and is taking colace, Amitiza (5 tablets a day), magnesium oxide 200 mg. Is also taking Creon 12-15 tablets daily. Will have 1 episode of diarrhea a week which is stable from before- when this happens she will have very loose stools and urgency. Also notes increased bloating when consuming gluten.     GERD, Dysphagia   Summarized/taken from prior documentation   Patient experiences GERD as substernal and throat burning with nocturnal relaxant causing choking. She had previously followed with Dr. George Flores in out clinic and reports a prior Schatzki's ring requiring previous dilation. Manometry was noral, and pH impedence had a DeMeester of 24 with positive symptoms association. Patient has treated GERD symptoms with BID PPI, Carafate, H2 blocker, and tums. At her last visit, she had been experiencing heartburn and has restarted omeprazole 40 mg daily with continued symptoms with specific food triggers. At the patient's last visit, she was instructed to start zantac 300 mg a day.     Today she " notes that since adding zantac, GERD has been well controlled. Is also taking daily omeprazole. Sometimes will note a deep/hoarse voice, this happens in the morning.      ROS:    Please see bottom of note     PROBLEM LIST  Patient Active Problem List    Diagnosis Date Noted     Iron deficiency anemia 03/22/2019     Priority: Medium     Headache, chronic migraine without aura 09/18/2018     Priority: Medium     Chronic pain syndrome 09/18/2018     Priority: Medium     S/P hernia repair 08/23/2018     Priority: Medium     Incisional hernia, without obstruction or gangrene 05/20/2018     Priority: Medium     Adhesive capsulitis of shoulder, unspecified laterality 11/14/2017     Priority: Medium     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  Document previously scanned on 1/12/17.  Validation form completed and scanned.  Code Status reflects choices in most recent ACP document..  Confirmed/documented designated decision maker(s).  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 Provider to review and confirm.       GERD (gastroesophageal reflux disease) 12/01/2010     Priority: Medium     Lumbago 04/18/2005     Priority: Medium     History of L5-S1 degenerative disk disease.         Sprain and strain of other specified sites of hip and thigh 12/09/2002      Priority: Medium     Chondromalacia of patella 2002     Priority: Medium     Need for prophylactic immunotherapy      Priority: Medium     trees, grass, srw, dust mites, cat       Allergic rhinitis due to other allergen 2001     Priority: Medium     Hypothyroidism      Priority: Medium     Problem list name updated by automated process. Provider to review       Sensorineural hearing loss 01/10/2005     Priority: Medium     Problem list name updated by automated process. Provider to review       Vertiginous syndrome and labyrinthine disorder 01/10/2005     Priority: Medium     Problem list name updated by automated process. Provider to review         PERTINENT PAST MEDICAL HISTORY:  Past Medical History:   Diagnosis Date     Allergic rhinitis, cause unspecified      Allergy to other foods     strawberries, apples, celeries, alice, watermelon     Arthritis     left knee     Choledocholithiasis     long after cholecystectomy     Chronic abdominal pain      Chronic constipation      Chronic nausea      Chronic pancreatitis (H)      Degeneration of lumbar or lumbosacral intervertebral disc      Esophageal reflux     w/ hiatal hernia     Gastroparesis      Hiatal hernia      History of pituitary adenoma     s/p resection     Hypothyroidism      Migraines      Mild hyperlipidemia      On tube feeding diet     presence of GJ tube     Pancreatic disease     PD stricture, 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 endometriosis growth     APPENDECTOMY       C  DELIVERY ONLY       C  DELIVERY ONLY      repeat c section with incidental cystotomy with repair     C EXCIS PITUITARY,TRANSNASAL/SEPTAL      pituitary tumor removed  for diabetes insipidus     C TOTAL ABDOM HYSTERECTOMY      w/ bilateral salpingoophorectomy      C WRIST ARTHROSCOP,RELEASE XVERS LIG Bilateral 08      SECTION       COLONOSCOPY       ENDOSCOPIC RETROGRADE CHOLANGIOPANCREATOGRAM N/A 2015    Procedure: ENDOSCOPIC RETROGRADE CHOLANGIOPANCREATOGRAM;  Surgeon: Mandeep Park MD;  [facility]     ENDOSCOPIC RETROGRADE CHOLANGIOPANCREATOGRAM N/A 2016    Procedure: COMBINED ENDOSCOPIC RETROGRADE CHOLANGIOPANCREATOGRAPHY, PLACE TUBE/STENT;  Surgeon: Mandeep Park MD;  [facility]     ENDOSCOPIC RETROGRADE CHOLANGIOPANCREATOGRAM N/A 3/17/2016    Procedure: COMBINED ENDOSCOPIC RETROGRADE CHOLANGIOPANCREATOGRAPHY, REMOVE FOREIGN BODY OR STENT/TUBE;  Surgeon: Mandeep Park MD;  [facility]     ENDOSCOPIC RETROGRADE CHOLANGIOPANCREATOGRAM N/A 2016    Procedure: COMBINED ENDOSCOPIC RETROGRADE CHOLANGIOPANCREATOGRAPHY, PLACE TUBE/STENT;  Surgeon: Mandeep Park MD;  [facility]     ENDOSCOPIC RETROGRADE CHOLANGIOPANCREATOGRAM N/A 2016    Procedure: COMBINED ENDOSCOPIC RETROGRADE CHOLANGIOPANCREATOGRAPHY, REMOVE FOREIGN BODY OR STENT/TUBE;  Surgeon: Mandeep Park MD;  [facility]     ENDOSCOPIC ULTRASOUND UPPER GASTROINTESTINAL TRACT (GI) N/A 10/3/2016    Procedure: ENDOSCOPIC ULTRASOUND, ESOPHAGOSCOPY / UPPER GASTROINTESTINAL TRACT (GI);  Surgeon: Guru Jose Rodas MD;  [facility]     ESOPHAGOSCOPY, GASTROSCOPY, DUODENOSCOPY (EGD), COMBINED N/A 2015    Procedure: COMBINED ESOPHAGOSCOPY, GASTROSCOPY, DUODENOSCOPY (EGD), REMOVE FOREIGN BODY;  Surgeon: Mandeep Park MD;  [facility]     ESOPHAGOSCOPY, GASTROSCOPY, DUODENOSCOPY (EGD), COMBINED N/A 10/25/2015    Procedure: COMBINED ESOPHAGOSCOPY, GASTROSCOPY, DUODENOSCOPY (EGD);  Surgeon: Sammy Amaro MD;  [facility]     ESOPHAGOSCOPY, GASTROSCOPY, DUODENOSCOPY (EGD), COMBINED  N/A 10/25/2015    Procedure: COMBINED ESOPHAGOSCOPY, GASTROSCOPY, DUODENOSCOPY (EGD), BIOPSY SINGLE OR MULTIPLE;  Surgeon: Sammy Amaro MD;  [facility]     ESOPHAGOSCOPY, GASTROSCOPY, DUODENOSCOPY (EGD), DILATATION, COMBINED       EXCISE LESION TRUNK N/A 4/17/2017    Procedure: EXCISE LESION TRUNK;  Removal of Abdominal Foreign Body;  Surgeon: Nestor Phoenix MD;  [facility]     HC ESOPH/GAS REFLUX TEST W NASAL IMPED >1 HR N/A 11/19/2015    Procedure: ESOPHAGEAL IMPEDENCE FUNCTION TEST WITH 24 HOUR PH GREATER THAN 1 HOUR;  Surgeon: Thiago Apple MD;  Location: [facility]     HC UGI ENDOSCOPY DIAG W BIOPSY  9/17/08     HC UGI ENDOSCOPY DIAG W BIOPSY  9/27/12     HC UGI ENDOSCOPY W ESOPHAGEAL DILATION BALLOON <30MM  9/17/08     HC UGI ENDOSCOPY W EUS N/A 5/5/2015    Procedure: COMBINED ENDOSCOPIC ULTRASOUND, ESOPHAGOSCOPY, GASTROSCOPY, DUODENOSCOPY (EGD);  Surgeon: Wm Dueñas MD;  [facility]     INJECT TRANSVERSUS ABDOMINIS PLANE (TAP) BLOCK BILATERAL Left 9/22/2016    Procedure: INJECT TRANSVERSUS ABDOMINIS PLANE (TAP) BLOCK BILATERAL;  Surgeon: Dickson Corrigan MD;  [facility]     laparoscopic pineda  1995     LAPAROSCOPIC HERNIORRHAPHY INCISIONAL N/A 8/23/2018    Procedure: LAPAROSCOPIC HERNIORRHAPHY INCISIONAL;  Laparoscopic Incisional Hernia Repair with Symbotex Mesh Implant;  Surgeon: Nestor Phoenix MD;  [facility]     LAPAROSCOPIC PANCREATECTOMY, TRANSPLANT AUTO ISLET CELL N/A 12/28/2016    Procedure: LAPAROSCOPIC PANCREATECTOMY, TRANSPLANT AUTO ISLET CELL;  Surgeon: Nestor Phoenix MD;  [facility]     transphenoidal pituitary resection  1990       ALLERGIES:  Allergies   Allergen Reactions     Apple Anaphylaxis     Depakote [Divalproex Sodium] Other (See Comments)     Chest pain     Zithromax [Azithromycin Dihydrate] Anaphylaxis     Noted in 4/7/08 ER     Darvocet [Propoxyphene N-Apap] Itching     Morphine Nausea and Vomiting and Rash     Nalbuphine Hcl Rash      RASH :nubaine     Zosyn [Piperacillin-Tazobactam In D5w] Rash     Possible allergy, did have a diffuse rash that seemed drug related but could have also been related to soap in the hospital.      Bactrim [Sulfamethoxazole W-Trimethoprim] Other (See Comments) and Nausea and Vomiting     Severely low liver function.     Cats      Compazine [Prochlorperazine] Other (See Comments)     Twitching. Takes Benedryl and is fine     Corticosteroids Other (See Comments)     All oral,IV and injectable steroids are contraindicated (unless in life threatening situations) in [facility] transplant recipients. They can cause irreversible loss of islet cell function. Please contact patients transplant care coordinator Mecca Watkins RN BSN @ [phone]/Pager [phone], and Endocrinologist prior to administration.     Dust Mites      Grass      Prednisone Other (See Comments)     Insomnia       Ragweeds      Tape [Adhesive Tape] Blisters     Tramadol      Trees      Zofran [Ondansetron] Other (See Comments)     migraine     Flagyl [Metronidazole] Hives and Rash       PERTINENT MEDICATIONS:    Current Outpatient Medications:      acetaminophen 500 MG CAPS, Take 2 mg by mouth 2 times daily, Disp: , Rfl:      amitriptyline (ELAVIL) 10 MG tablet, Take 4 tablets (40 mg) by mouth At Bedtime Take 40 mg at bedtime, Disp: 120 tablet, Rfl: 11     amitriptyline (ELAVIL) 50 MG tablet, Take 1 tablet (50 mg) by mouth At Bedtime (Patient taking differently: Take 40 mg by mouth At Bedtime ), Disp: 60 tablet, Rfl: 3     amylase-lipase-protease (CREON 24) 85752-11180 units CPEP per EC capsule, Take 3-4 capsules by mouth with meals and 1-2 with snacks. Maximum 15 capsules per day., Disp: 450 capsule, Rfl: 11     aspirin 81 MG EC tablet, Take 1 tablet (81 mg) by mouth daily, Disp: 90 tablet, Rfl: 3     azelastine (ASTELIN) 0.1 % spray, Spray 1 spray into both nostrils 2 times daily, Disp: , Rfl:      blood glucose monitoring (PAT CONTOUR NEXT)  test strip, Use to test blood sugar 6 times daily or as directed., Disp: 2 Box, Rfl: 11     blood glucose monitoring (PAT MICROLET) lancets, Use to test blood sugar 6-8 times daily or as directed., Disp: 1 Box, Rfl: prn     calcium carbonate-vitamin D (CALCIUM 500/D) 500-200 MG-UNIT tablet, , Disp: , Rfl:      cetirizine (ZYRTEC) 10 MG tablet, Take 10 mg by mouth daily, Disp: , Rfl:      diphenhydrAMINE (BENADRYL ALLERGY) 25 MG capsule, Take 1 capsule (25 mg) by mouth every 6 hours as needed for itching or allergies, Disp: 56 capsule, Rfl: 0     docusate sodium (COLACE) 100 MG capsule, Take 1 capsule (100 mg) by mouth 2 times daily as needed for constipation,, Disp: 60 capsule, Rfl: 0     estradiol (VAGIFEM) 10 MCG TABS vaginal tablet, , Disp: , Rfl:      fish oil-omega-3 fatty acids 1000 MG capsule, , Disp: , Rfl:      FOLIC ACID PO, Take 1 mg by mouth daily, Disp: , Rfl:      glucose 40 % GEL gel, Take 15-30 g by mouth every 15 minutes as needed for low blood sugar, Disp: 3 Tube, Rfl: 2     ibuprofen (ADVIL/MOTRIN) 400 MG tablet, Take 1 tablet (400 mg) by mouth every 6 hours as needed for moderate pain, Disp: 30 tablet, Rfl: 0     levothyroxine (SYNTHROID) 137 MCG tablet, Take 1 tablet (137 mcg) by mouth daily, Disp: 90 tablet, Rfl: 3     lubiprostone (AMITIZA) 8 MCG capsule, Take 5 capsules (40 mcg) by mouth daily Take 3 capsules in AM and 2 capsules in PM, Disp: 150 capsule, Rfl: 3     magnesium oxide 200 MG TABS, , Disp: , Rfl:      multivitamin CF formula (CHOICEFUL) capsule, Take 1 tablet by mouth daily, Disp: , Rfl: 11     omeprazole (PRILOSEC) 40 MG capsule, Take 1 capsule (40 mg) by mouth daily, Disp: 90 capsule, Rfl: 3     order for DME, Equipment being ordered:Cefaly, Disp: 1 Device, Rfl: 0     prochlorperazine (COMPAZINE) 5 MG tablet, Take two 5 mg tablets by mouth every six hours as needed for nausea., Disp: 90 tablet, Rfl: 3     promethazine (PHENERGAN) 25 MG tablet, Take 1 tablet (25 mg) by mouth  At Bedtime, Disp: 60 tablet, Rfl: 3     ranitidine (ZANTAC) 150 MG tablet, Take 2 tablets (300 mg) by mouth daily (Patient not taking: Reported on 2019), Disp: 180 tablet, Rfl: 3     ranitidine (ZANTAC) 150 MG tablet, Take 1 tablet (150 mg) by mouth At Bedtime, Disp: 90 tablet, Rfl: 3     senna-docusate (SENOKOT-S;PERICOLACE) 8.6-50 MG per tablet, Take 1-2 tablets by mouth 2 times daily, Disp: 100 tablet, Rfl: 0     Sharps Container (BD SHARPS ) MISC, 1 Container as needed, Disp: 1 each, Rfl: 1     ZOLMitriptan (ZOMIG) 5 MG tablet, Take 1 tablet (5 mg) by mouth at onset of headache for migraine, Disp: 9 tablet, Rfl: 3    SOCIAL HISTORY:  Social History     Socioeconomic History     Marital status:      Spouse name: Norris     Number of children: 3     Years of education: 16     Highest education level: Not on file   Occupational History     Occupation: director     Employer: [employer]   Social Needs     Financial resource strain: Not on file     Food insecurity:     Worry: Not on file     Inability: Not on file     Transportation needs:     Medical: Not on file     Non-medical: Not on file   Tobacco Use     Smoking status: Former Smoker     Packs/day: 0.50     Years: 6.00     Pack years: 3.00     Types: Cigarettes     Start date: 1985     Last attempt to quit: 1992     Years since quittin.5     Smokeless tobacco: Never Used     Tobacco comment: no 2nd hand   Substance and Sexual Activity     Alcohol use: Yes     Alcohol/week: 1.8 - 3.6 oz     Types: 1 - 2 Glasses of wine, 1 - 2 Cans of beer, 1 - 2 Shots of liquor per week     Comment: occasional     Drug use: No     Sexual activity: Yes     Partners: Male     Birth control/protection: None     Comment: Hystectomy 1999   Lifestyle     Physical activity:     Days per week: Not on file     Minutes per session: Not on file     Stress: Not on file   Relationships     Social connections:     Talks on phone: Not on file     Gets together: Not  on file     Attends Jehovah's witness service: Not on file     Active member of club or organization: Not on file     Attends meetings of clubs or organizations: Not on file     Relationship status: Not on file     Intimate partner violence:     Fear of current or ex partner: Not on file     Emotionally abused: Not on file     Physically abused: Not on file     Forced sexual activity: Not on file   Other Topics Concern     Parent/sibling w/ CABG, MI or angioplasty before 65F 55M? No      Service Not Asked     Blood Transfusions No     Caffeine Concern Not Asked     Occupational Exposure Not Asked     Hobby Hazards Not Asked     Sleep Concern Not Asked     Stress Concern Not Asked     Weight Concern Not Asked     Special Diet Not Asked     Back Care Not Asked     Exercise Not Asked     Bike Helmet Not Asked     Seat Belt Yes     Self-Exams Not Asked   Social History Narrative     with 3 children and a dog.  No smoking, etoh or drug use.  Worked as a  for [employer] in [place] in the past.  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       FAMILY HISTORY:  Family History   Problem Relation Age of Onset     Eye Disorder Father         cataract, detached retina     Myocardial Infarction Father 60     Lipids Father      Cerebrovascular Disease Father      Depression Father      Substance Abuse Father      Anesthesia Reaction Father         stroke right after surgery     Cataracts Father      Osteoarthritis Father      Ulcerative Colitis Father      Lipids Mother      Hypertension Mother      Thyroid Disease Mother      Depression Mother      Angina Mother      GERD Mother      Skin Cancer Mother      Eye Disorder Son         ptosis     Eye Disorder Paternal Grandmother         cataract     Eye Disorder Paternal Grandfather         cataract     Diabetes Paternal Grandfather      Eye Disorder Maternal Grandmother         cataract     Thyroid Disease Maternal  "Grandmother      Coronary Artery Disease Sister         angioplasty     GERD Sister      Substance Abuse Sister      Depression Sister      Depression Son      Anxiety Disorder Son      Thyroid Disease Sister      Diabetes Maternal Grandfather      Depression Nephew      Anxiety Disorder Nephew      Thyroid Disease Nephew      Diabetes Type 2  Cousin         paternal cousin     Heart Disease Paternal Aunt      Diabetes Paternal Aunt      Diabetes Paternal Uncle      Heart Disease Paternal Uncle      Past/family/social history reviewed and no changes    PHYSICAL EXAMINATION:  Constitutional: aaox3, cooperative, pleasant, not dyspneic/diaphoretic, no acute distress  Vitals reviewed: /67 (BP Location: Left arm, Patient Position: Sitting, Cuff Size: Adult Regular)   Pulse 96   Temp 98.5  F (36.9  C) (Oral)   Ht 1.712 m (5' 7.4\")   Wt 71.2 kg (156 lb 14.4 oz)   SpO2 97%   BMI 24.28 kg/m    Wt:   Wt Readings from Last 2 Encounters:   07/02/19 71.2 kg (156 lb 14.4 oz)   04/05/19 70.8 kg (156 lb)      Eyes: Sclera anicteric/injected  Ears/nose/mouth/throat: Normal oropharynx without ulcers or exudate, mucus membranes moist, hearing intact  Neck: supple, thyroid normal size  CV: No edema  Respiratory: Unlabored breathing  Abdominal: soft non-tender to palpation, no hepatosplenomegaly   Skin: warm, perfused, no jaundice  Psych: Normal affect  MSK: Normal gait      PERTINENT STUDIES:  Orders Only on 09/13/2018   Component Date Value Ref Range Status     WBC 09/13/2018 5.8  4.0 - 11.0 10e9/L Final     RBC Count 09/13/2018 4.51  3.8 - 5.2 10e12/L Final     Hemoglobin 09/13/2018 14.1  11.7 - 15.7 g/dL Final     Hematocrit 09/13/2018 42.1  35.0 - 47.0 % Final     MCV 09/13/2018 93  78 - 100 fl Final     MCH 09/13/2018 31.3  26.5 - 33.0 pg Final     MCHC 09/13/2018 33.5  31.5 - 36.5 g/dL Final     RDW 09/13/2018 13.2  10.0 - 15.0 % Final     Platelet Count 09/13/2018 430  150 - 450 10e9/L Final     Diff Method " 09/13/2018 Automated Method   Final     % Neutrophils 09/13/2018 37.1  % Final     % Lymphocytes 09/13/2018 40.5  % Final     % Monocytes 09/13/2018 10.5  % Final     % Eosinophils 09/13/2018 9.8  % Final     % Basophils 09/13/2018 2.1  % Final     % Immature Granulocytes 09/13/2018 0.0  % Final     Nucleated RBCs 09/13/2018 0  0 /100 Final     Absolute Neutrophil 09/13/2018 2.2  1.6 - 8.3 10e9/L Final     Absolute Lymphocytes 09/13/2018 2.4  0.8 - 5.3 10e9/L Final     Absolute Monocytes 09/13/2018 0.6  0.0 - 1.3 10e9/L Final     Absolute Eosinophils 09/13/2018 0.6  0.0 - 0.7 10e9/L Final     Absolute Basophils 09/13/2018 0.1  0.0 - 0.2 10e9/L Final     Abs Immature Granulocytes 09/13/2018 0.0  0 - 0.4 10e9/L Final     Absolute Nucleated RBC 09/13/2018 0.0   Final     Sodium 09/13/2018 138  133 - 144 mmol/L Final     Potassium 09/13/2018 4.3  3.4 - 5.3 mmol/L Final     Chloride 09/13/2018 102  94 - 109 mmol/L Final     Carbon Dioxide 09/13/2018 30  20 - 32 mmol/L Final     Anion Gap 09/13/2018 5  3 - 14 mmol/L Final     Glucose 09/13/2018 119* 70 - 99 mg/dL Final     Urea Nitrogen 09/13/2018 12  7 - 30 mg/dL Final     Creatinine 09/13/2018 0.83  0.52 - 1.04 mg/dL Final     GFR Estimate 09/13/2018 72  >60 mL/min/1.7m2 Final     GFR Estimate If Black 09/13/2018 87  >60 mL/min/1.7m2 Final     Calcium 09/13/2018 9.2  8.5 - 10.1 mg/dL Final     Bilirubin Direct 09/13/2018 0.2  0.0 - 0.2 mg/dL Final     Bilirubin Total 09/13/2018 0.8  0.2 - 1.3 mg/dL Final     Albumin 09/13/2018 3.9  3.4 - 5.0 g/dL Final     Protein Total 09/13/2018 7.8  6.8 - 8.8 g/dL Final     Alkaline Phosphatase 09/13/2018 145  40 - 150 U/L Final     ALT 09/13/2018 59* 0 - 50 U/L Final     AST 09/13/2018 39  0 - 45 U/L Final     IGG 09/13/2018 1160  695 - 1620 mg/dL Final     IgG1 09/13/2018 416  300 - 856 mg/dL Final     IgG2 09/13/2018 384  158 - 761 mg/dL Final     IgG3 09/13/2018 83  24 - 192 mg/dL Final     IgG4 09/13/2018 95* 11 - 86 mg/dL Final        Answers for HPI/ROS submitted by the patient on 7/1/2019   If you checked off any problems, how difficult have these problems made it for you to do your work, take care of things at home, or get along with other people?: Not difficult at all  PHQ9 TOTAL SCORE: 3  General Symptoms: Yes  Skin Symptoms: No  HENT Symptoms: Yes  EYE SYMPTOMS: No  HEART SYMPTOMS: No  LUNG SYMPTOMS: No  INTESTINAL SYMPTOMS: Yes  URINARY SYMPTOMS: No  GYNECOLOGIC SYMPTOMS: No  BREAST SYMPTOMS: No  SKELETAL SYMPTOMS: Yes  BLOOD SYMPTOMS: No  NERVOUS SYSTEM SYMPTOMS: No  MENTAL HEALTH SYMPTOMS: No  Fever: No  Loss of appetite: Yes  Weight loss: No  Weight gain: No  Fatigue: Yes  Night sweats: Yes  Chills: No  Increased stress: No  Excessive hunger: No  Excessive thirst: No  Feeling hot or cold when others believe the temperature is normal: No  Loss of height: No  Post-operative complications: No  Surgical site pain: No  Hallucinations: No  Change in or Loss of Energy: Yes  Hyperactivity: No  Confusion: No  Ear pain: No  Ear discharge: No  Hearing loss: No  Tinnitus: No  Nosebleeds: No  Congestion: Yes  Sinus pain: No  Trouble swallowing: No   Voice hoarseness: Yes  Mouth sores: No  Sore throat: No  Tooth pain: No  Gum tenderness: No  Bleeding gums: No  Change in taste: No  Change in sense of smell: No  Dry mouth: Yes  Hearing aid used: No  Neck lump: No  Heart burn or indigestion: Yes  Nausea: Yes  Vomiting: Yes  Abdominal pain: Yes  Bloating: Yes  Constipation: No  Diarrhea: Yes  Blood in stool: No  Black stools: No  Rectal or Anal pain: No  Fecal incontinence: No  Yellowing of skin or eyes: No  Vomit with blood: No  Change in stools: Yes  Back pain: Yes  Muscle aches: Yes  Neck pain: No  Swollen joints: No  Joint pain: No  Bone pain: No  Muscle cramps: Yes  Muscle weakness: No  Joint stiffness: Yes  Bone fracture: No

## 2019-07-02 NOTE — PATIENT INSTRUCTIONS
It was a pleasure taking care of you today.  I've included a brief summary of our discussion and care plan from today's visit below.  Please review this information with your primary care provider.  ______________________________________________________________________    My recommendations are summarized as follows:    -- could consider trying IBGard for cramping (while monitoring GERD symptoms)    -- track symptoms and possible triggers     -- magnesium oxide 200 mg daily     -- continue Amitiza, colace as you have been     -- continue zantac 150 mg daily, Prilosec 40 mg daily 3-60 minutes before eating    -- continue phenergan at night as needed for nausea    -- let us know if your symptoms change or worsen    Return to GI Clinic in 4-6 months to review your progress.    ______________________________________________________________________    Who do I call with any questions after my visit?  Please be in touch if there are any further questions that arise following today's visit.  There are multiple ways to contact your gastroenterology care team.        During business hours, you may reach a Gastroenterology nurse at [phone]      To schedule or reschedule an appointment, please call [phone].       You can always send a secure message through [employer].  [employer] messages are answered by your nurse or doctor typically within 24 hours.  Please allow extra time on weekends and holidays.        For urgent/emergent questions after business hours, you may reach the on-call GI Fellow by contacting the [facility] at [phone].     How will I get the results of any tests ordered?    You will receive all of your results.  If you have signed up for [employer], any tests ordered at your visit will be available to you after your physician reviews them.  Typically this takes 1-2 weeks.  If there are urgent results that require a change in your care plan, your physician or nurse will call you to  discuss the next steps.      What is [employer]?  [employer] is a secure way for you to access all of your healthcare records from the [facility].  It is a web based computer program, so you can sign on to it from any location.  It also allows you to send secure messages to your care team.  I recommend signing up for [employer] access if you have not already done so and are comfortable with using a computer.      How to I schedule a follow-up visit?  If you did not schedule a follow-up visit today, please call [phone] to schedule a follow-up office visit.        Sincerely,    Rosanne Marti PA-C  Division of Gastroenterology, Hepatology & Nutrition  [facility]

## 2019-07-02 NOTE — LETTER
7/2/2019       RE: Dinora Mcghee  [address] [facility] [address]     Dear Colleague,    Thank you for referring your patient, Dinora Mcghee, to the [facility] GASTRO[facility]OLOG[facility] IBD [facility]. Please see a copy of my visit note below.    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     1.  Constipation, nausea, vomiting,   Patient reports she has not had symptoms of constipation and will have regular soft bowel movements 6 days a week, with continued episodes of loose stools one day a week. May represent continued constipation with over-flow diarrhea, food insensitivity, or IBS. Discussed that she should track symptoms on a blank calender to try and identify particular food/life triggers of symptoms. She continues to take Amitiza 5 tablets a day, Colace, magnesium oxide 200 mg, in addition to Creon (his discontinued senna)-- was recommended to continue this bowel regimen at this time.Can also try OTC IbGard for abdominal discomfort/cramping.     If worsening constipation, can increase magnesium oxide or do partial clean-out with magnesium citrate.     Upper GI symptoms of nausea most recently well controlled with Phenergan at night, and Compazine as needed. Patient should take anti-nausea medications sparingly due to increased risk of constipation. GERD is well managed with Zantac 150 daily and Prilosec 40 mg daily before at night which she can continue at this point. If  her symptoms are controlled in the future, can consider decreasing Prilosec to 20 mg.     -- could consider trying IBGard for cramping (while monitoring GERD symptoms)  -- track symptoms and possible triggers   -- magnesium oxide 200 mg daily   -- continue Amitiza, colace as you have been   -- continue zantac 150 mg daily, Prilosec 40 mg daily 3-60 minutes before eating  -- continue phenergan at night as needed for nausea  -- let us know if your symptoms change or worsen    RTC 4-6 months     Thank you for this consultation. Patient discussed in brief with Dr. Genao. It was a pleasure to participate in the care of this patient; please contact us with any further questions.     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     Summarized from previous documentation with Dr. Genao and I, please see previous notes for further details     Gastroparesis  Gastric emptying study with 2-hour study at [facility] have been consistent with gastroparesis, patient had follow-up study here 6/27/2016 with 49% remaining at 4 hours however the patient was not aware she was on narcotics at the time.  Patient had previous GJ tubes in fall 2016, NG tube used for venting with tube feeds and was able to wean off of tube feeds after TPA T.  At initial visit she reported severe migraine headaches associated with vomiting and is on amitriptyline for migraine prophylaxis.  Patient has used several medication for the symptoms including scopolamine patch, pro-chloro  ervin Joy, promethazine which helped symptoms.  She has also been seen by neurology and psychology due to concern that she has a difficult time sleeping and with migraines.  At her last visit, patient had been working with dietition to increase caloric intake and was maintaining weight around 150 lbs. She reported occasional nausea without vomiting, with 1 episode in the last month. Symptoms may have been worsened due to anxiety regarding surgery and ativan had helped.  At the patient's last visit:  the patient reports that since taking the Phenergan at night, her nausea and vomiting has improved significantly and she is not waking up as often.  She does report an episode around the holidays in which he vomited 5-6 times however thinks this may be due to a GI bug.  Now she reports that she has nausea about 2 times a week depending on the week.  Is also taking Compazine as needed and no longer taking Zofran.  She also understands that blood sugar may play a role in her nausea therefore she will check her blood sugar when nauseous.    Today: The patient notes improvement in her symptoms if she waits to eat breakfast until 10-11 in the morning. She continues to take compazine in the morning, and phenergan at night. Still will experience vomiting, especially if she does not take phenergan at night. Nausea/vomiting worsens with migraines which have improved while avoiding gluten and increasing fruits and vegetables (2 days a month instead of 4 times a week). Also has cut back on meat and aims to eat more chicken and fish.     Constipation/irregular bowel movements/bloating  Patient reports history of ongoing constipation for over 20 years after having her first child.  She has tried multiple interventions including a bowel cleanout, MiraLAX, senna, milk of magnesium, Linzess, and Amitiza for which she was on when she first came to [facility] and  GI clinic.  Patient also takes creon.  Patient has done a course of rifaximin in  "the past with with improvement in bloating and stool consistency.  Patient has also been on specific SIBO/FODMAP diets to the [facility] while avoiding certain food triggers. At the patient's last visit: the patient notes that she continues to have occasional watery stools which she describes occurs on \"clearing days \".  This will happen every couple of days in which she describes urgent bowel movements with a large amount of stool requiring multiple trips to the bathroom.  When this occurs, she feels as though it has drained her energy.  Will also have abdominal discomfort associated with this which is slightly alleviated with a heating pad.  On other days, she will have a more solid bowel movement which she describes as a coil.  She plans to reschedule around having a bowel movement and her daily routine that involves exercise, drinking hot water, and guided imagery in order to have 1-2 satisfying bowel movements.  Continues to follow low FODMAP diet.  She continues to take amities of 5 tablets a day, occasional senna, and daily Colace. On average, the patient will take 12-15 creon tablets a day.     Today she reports she typically has 2 bowel movements a day and denies significant constipation. Has cut out Senna, and is taking colace, Amitiza (5 tablets a day), magnesium oxide 200 mg. Is also taking Creon 12-15 tablets daily. Will have 1 episode of diarrhea a week which is stable from before- when this happens she will have very loose stools and urgency. Also notes increased bloating when consuming gluten.     GERD, Dysphagia   Summarized/taken from prior documentation   Patient experiences GERD as substernal and throat burning with nocturnal relaxant causing choking. She had previously followed with Dr. George Flores in out clinic and reports a prior Schatzki's ring requiring previous dilation. Manometry was noral, and pH impedence had a DeMeester of 24 with positive symptoms association. Patient has treated " GERD symptoms with BID PPI, Carafate, H2 blocker, and tums. At her last visit, she had been experiencing heartburn and has restarted omeprazole 40 mg daily with continued symptoms with specific food triggers. At the patient's last visit, she was instructed to start zantac 300 mg a day.     Today she notes that since adding zantac, GERD has been well controlled. Is also taking daily omeprazole. Sometimes will note a deep/hoarse voice, this happens in the morning.      ROS:    Please see bottom of note     PROBLEM LIST  Patient Active Problem List    Diagnosis Date Noted     Iron deficiency anemia 03/22/2019     Priority: Medium     Headache, chronic migraine without aura 09/18/2018     Priority: Medium     Chronic pain syndrome 09/18/2018     Priority: Medium     S/P hernia repair 08/23/2018     Priority: Medium     Incisional hernia, without obstruction or gangrene 05/20/2018     Priority: Medium     Adhesive capsulitis of shoulder, unspecified laterality 11/14/2017     Priority: Medium     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  Document previously scanned on 1/12/17.  Validation form completed and scanned.  Code Status reflects choices in most recent ACP document..  Confirmed/documented designated decision maker(s).  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 Provider to review and confirm.       GERD (gastroesophageal reflux disease) 12/01/2010     Priority: Medium     Lumbago 04/18/2005     Priority: Medium     History of L5-S1 degenerative disk disease.         Sprain and strain of other specified sites of hip and thigh 12/09/2002     Priority: Medium     Chondromalacia of patella 12/09/2002     Priority: Medium     Need for prophylactic immunotherapy      Priority: Medium     trees, grass, srw, dust mites, cat       Allergic rhinitis due to other allergen 12/21/2001     Priority: Medium     Hypothyroidism      Priority: Medium     Problem list name updated by automated process. Provider to review       Sensorineural hearing loss 01/10/2005     Priority: Medium     Problem list name updated by automated process. Provider to review       Vertiginous syndrome and labyrinthine disorder 01/10/2005     Priority: Medium     Problem list name updated by automated process. Provider to review         PERTINENT PAST MEDICAL HISTORY:  Past Medical History:   Diagnosis Date     Allergic rhinitis, cause unspecified      Allergy to other foods     strawberries, apples, celeries, alice, watermelon     Arthritis     left knee     Choledocholithiasis     long after cholecystectomy     Chronic abdominal pain      Chronic constipation      Chronic nausea      Chronic pancreatitis (H)      Degeneration of lumbar or lumbosacral intervertebral disc      Esophageal reflux     w/ hiatal hernia     Gastroparesis      Hiatal hernia      History of pituitary adenoma     s/p resection     Hypothyroidism      Migraines      Mild hyperlipidemia      On tube feeding diet     presence of GJ tube     Pancreatic disease     PD stricture, 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 endometriosis growth     APPENDECTOMY       C  DELIVERY ONLY       C  DELIVERY ONLY      repeat c section with incidental cystotomy with repair     C EXCIS PITUITARY,TRANSNASAL/SEPTAL  1980    pituitary tumor removed for diabetes insipidus     C TOTAL ABDOM HYSTERECTOMY      w/ bilateral salpingoophorectomy      C WRIST ARTHROSCOP,RELEASE XVERS LIG Bilateral 08      SECTION       COLONOSCOPY       ENDOSCOPIC RETROGRADE CHOLANGIOPANCREATOGRAM N/A 2015    Procedure: ENDOSCOPIC RETROGRADE CHOLANGIOPANCREATOGRAM;  Surgeon: Mandeep Park MD;  [facility]: [facility]     ENDOSCOPIC RETROGRADE CHOLANGIOPANCREATOGRAM N/A 2016    Procedure: COMBINED ENDOSCOPIC RETROGRADE CHOLANGIOPANCREATOGRAPHY, PLACE TUBE/STENT;  Surgeon: Mandeep Park MD;  [facility]     ENDOSCOPIC RETROGRADE CHOLANGIOPANCREATOGRAM N/A 3/17/2016    Procedure: COMBINED ENDOSCOPIC RETROGRADE CHOLANGIOPANCREATOGRAPHY, REMOVE FOREIGN BODY OR STENT/TUBE;  Surgeon: Mandeep Park MD;  [facility]: [facility]     ENDOSCOPIC RETROGRADE CHOLANGIOPANCREATOGRAM N/A 2016    Procedure: COMBINED ENDOSCOPIC RETROGRADE CHOLANGIOPANCREATOGRAPHY, PLACE TUBE/STENT;  Surgeon: Mandeep Pakr MD;  [facility]     ENDOSCOPIC RETROGRADE CHOLANGIOPANCREATOGRAM N/A 2016    Procedure: COMBINED ENDOSCOPIC RETROGRADE CHOLANGIOPANCREATOGRAPHY, REMOVE FOREIGN BODY OR STENT/TUBE;  Surgeon: Mandeep Park MD;  [facility]     ENDOSCOPIC ULTRASOUND UPPER GASTROINTESTINAL TRACT (GI) N/A 10/3/2016    Procedure: ENDOSCOPIC ULTRASOUND, ESOPHAGOSCOPY / UPPER GASTROINTESTINAL TRACT (GI);  Surgeon: Guru Jose Rodas MD;  Location: [facility]     ESOPHAGOSCOPY, GASTROSCOPY, DUODENOSCOPY (EGD), COMBINED N/A 2015    Procedure: COMBINED ESOPHAGOSCOPY, GASTROSCOPY, DUODENOSCOPY (EGD), REMOVE FOREIGN BODY;  Surgeon:  Mandeep Park MD;  [facility]     ESOPHAGOSCOPY, GASTROSCOPY, DUODENOSCOPY (EGD), COMBINED N/A 10/25/2015    Procedure: COMBINED ESOPHAGOSCOPY, GASTROSCOPY, DUODENOSCOPY (EGD);  Surgeon: Sammy Amaro MD;  [facility]     ESOPHAGOSCOPY, GASTROSCOPY, DUODENOSCOPY (EGD), COMBINED N/A 10/25/2015    Procedure: COMBINED ESOPHAGOSCOPY, GASTROSCOPY, DUODENOSCOPY (EGD), BIOPSY SINGLE OR MULTIPLE;  Surgeon: Sammy Amaro MD;  Location: [facility]     ESOPHAGOSCOPY, GASTROSCOPY, DUODENOSCOPY (EGD), DILATATION, COMBINED       EXCISE LESION TRUNK N/A 4/17/2017    Procedure: EXCISE LESION TRUNK;  Removal of Abdominal Foreign Body;  Surgeon: Nestor Phoenix MD;  [facility]     HC ESOPH/GAS REFLUX TEST W NASAL IMPED >1 HR N/A 11/19/2015    Procedure: ESOPHAGEAL IMPEDENCE FUNCTION TEST WITH 24 HOUR PH GREATER THAN 1 HOUR;  Surgeon: Thiago Apple MD;  Location: [facility]     HC UGI ENDOSCOPY DIAG W BIOPSY  9/17/08     HC UGI ENDOSCOPY DIAG W BIOPSY  9/27/12     HC UGI ENDOSCOPY W ESOPHAGEAL DILATION BALLOON <30MM  9/17/08     HC UGI ENDOSCOPY W EUS N/A 5/5/2015    Procedure: COMBINED ENDOSCOPIC ULTRASOUND, ESOPHAGOSCOPY, GASTROSCOPY, DUODENOSCOPY (EGD);  Surgeon: Wm Dueñas MD;  Location: [facility]     INJECT TRANSVERSUS ABDOMINIS PLANE (TAP) BLOCK BILATERAL Left 9/22/2016    Procedure: INJECT TRANSVERSUS ABDOMINIS PLANE (TAP) BLOCK BILATERAL;  Surgeon: Dickson Corrigan MD;  [facility]     laparoscopic pineda  1995     LAPAROSCOPIC HERNIORRHAPHY INCISIONAL N/A 8/23/2018    Procedure: LAPAROSCOPIC HERNIORRHAPHY INCISIONAL;  Laparoscopic Incisional Hernia Repair with Symbotex Mesh Implant;  Surgeon: Nestor Phoenix MD;  Location: [facility]     LAPAROSCOPIC PANCREATECTOMY, TRANSPLANT AUTO ISLET CELL N/A 12/28/2016    Procedure: LAPAROSCOPIC PANCREATECTOMY, TRANSPLANT AUTO ISLET CELL;  Surgeon: Nestor Phoenix MD;  Location: [facility]     transphenoidal pituitary resection  1990        ALLERGIES:  Allergies   Allergen Reactions     Apple Anaphylaxis     Depakote [Divalproex Sodium] Other (See Comments)     Chest pain     Zithromax [Azithromycin Dihydrate] Anaphylaxis     Noted in 4/7/08 ER     Darvocet [Propoxyphene N-Apap] Itching     Morphine Nausea and Vomiting and Rash     Nalbuphine Hcl Rash     RASH :nubaine     Zosyn [Piperacillin-Tazobactam In D5w] Rash     Possible allergy, did have a diffuse rash that seemed drug related but could have also been related to soap in the hospital.      Bactrim [Sulfamethoxazole W-Trimethoprim] Other (See Comments) and Nausea and Vomiting     Severely low liver function.     Cats      Compazine [Prochlorperazine] Other (See Comments)     Twitching. Takes Benedryl and is fine     Corticosteroids Other (See Comments)     All oral,IV and injectable steroids are contraindicated (unless in life threatening situations) in [facility] transplant recipients. They can cause irreversible loss of islet cell function. Please contact patients transplant care coordinator Mecca Watkins RN BSN @ [phone]/Pager [phone], and Endocrinologist prior to administration.     Dust Mites      Grass      Prednisone Other (See Comments)     Insomnia       Ragweeds      Tape [Adhesive Tape] Blisters     Tramadol      Trees      Zofran [Ondansetron] Other (See Comments)     migraine     Flagyl [Metronidazole] Hives and Rash       PERTINENT MEDICATIONS:    Current Outpatient Medications:      acetaminophen 500 MG CAPS, Take 2 mg by mouth 2 times daily, Disp: , Rfl:      amitriptyline (ELAVIL) 10 MG tablet, Take 4 tablets (40 mg) by mouth At Bedtime Take 40 mg at bedtime, Disp: 120 tablet, Rfl: 11     amitriptyline (ELAVIL) 50 MG tablet, Take 1 tablet (50 mg) by mouth At Bedtime (Patient taking differently: Take 40 mg by mouth At Bedtime ), Disp: 60 tablet, Rfl: 3     amylase-lipase-protease (CREON 24) 85165-36372 units CPEP per EC capsule, Take 3-4 capsules by mouth with  meals and 1-2 with snacks. Maximum 15 capsules per day., Disp: 450 capsule, Rfl: 11     aspirin 81 MG EC tablet, Take 1 tablet (81 mg) by mouth daily, Disp: 90 tablet, Rfl: 3     azelastine (ASTELIN) 0.1 % spray, Spray 1 spray into both nostrils 2 times daily, Disp: , Rfl:      blood glucose monitoring (PAT CONTOUR NEXT) test strip, Use to test blood sugar 6 times daily or as directed., Disp: 2 Box, Rfl: 11     blood glucose monitoring (PAT MICROLET) lancets, Use to test blood sugar 6-8 times daily or as directed., Disp: 1 Box, Rfl: prn     calcium carbonate-vitamin D (CALCIUM 500/D) 500-200 MG-UNIT tablet, , Disp: , Rfl:      cetirizine (ZYRTEC) 10 MG tablet, Take 10 mg by mouth daily, Disp: , Rfl:      diphenhydrAMINE (BENADRYL ALLERGY) 25 MG capsule, Take 1 capsule (25 mg) by mouth every 6 hours as needed for itching or allergies, Disp: 56 capsule, Rfl: 0     docusate sodium (COLACE) 100 MG capsule, Take 1 capsule (100 mg) by mouth 2 times daily as needed for constipation,, Disp: 60 capsule, Rfl: 0     estradiol (VAGIFEM) 10 MCG TABS vaginal tablet, , Disp: , Rfl:      fish oil-omega-3 fatty acids 1000 MG capsule, , Disp: , Rfl:      FOLIC ACID PO, Take 1 mg by mouth daily, Disp: , Rfl:      glucose 40 % GEL gel, Take 15-30 g by mouth every 15 minutes as needed for low blood sugar, Disp: 3 Tube, Rfl: 2     ibuprofen (ADVIL/MOTRIN) 400 MG tablet, Take 1 tablet (400 mg) by mouth every 6 hours as needed for moderate pain, Disp: 30 tablet, Rfl: 0     levothyroxine (SYNTHROID) 137 MCG tablet, Take 1 tablet (137 mcg) by mouth daily, Disp: 90 tablet, Rfl: 3     lubiprostone (AMITIZA) 8 MCG capsule, Take 5 capsules (40 mcg) by mouth daily Take 3 capsules in AM and 2 capsules in PM, Disp: 150 capsule, Rfl: 3     magnesium oxide 200 MG TABS, , Disp: , Rfl:      multivitamin CF formula (CHOICEFUL) capsule, Take 1 tablet by mouth daily, Disp: , Rfl: 11     omeprazole (PRILOSEC) 40 MG capsule, Take 1 capsule (40 mg) by  mouth daily, Disp: 90 capsule, Rfl: 3     order for DME, Equipment being ordered:Cefaly, Disp: 1 Device, Rfl: 0     prochlorperazine (COMPAZINE) 5 MG tablet, Take two 5 mg tablets by mouth every six hours as needed for nausea., Disp: 90 tablet, Rfl: 3     promethazine (PHENERGAN) 25 MG tablet, Take 1 tablet (25 mg) by mouth At Bedtime, Disp: 60 tablet, Rfl: 3     ranitidine (ZANTAC) 150 MG tablet, Take 2 tablets (300 mg) by mouth daily (Patient not taking: Reported on 2019), Disp: 180 tablet, Rfl: 3     ranitidine (ZANTAC) 150 MG tablet, Take 1 tablet (150 mg) by mouth At Bedtime, Disp: 90 tablet, Rfl: 3     senna-docusate (SENOKOT-S;PERICOLACE) 8.6-50 MG per tablet, Take 1-2 tablets by mouth 2 times daily, Disp: 100 tablet, Rfl: 0     Sharps Container (BD SHARPS ) MISC, 1 Container as needed, Disp: 1 each, Rfl: 1     ZOLMitriptan (ZOMIG) 5 MG tablet, Take 1 tablet (5 mg) by mouth at onset of headache for migraine, Disp: 9 tablet, Rfl: 3    SOCIAL HISTORY:  Social History     Socioeconomic History     Marital status:      Spouse name: Norris     Number of children: 3     Years of education: 16     Highest education level: Not on file   Occupational History     Occupation: director     Employer: [employer]   Social Needs     Financial resource strain: Not on file     Food insecurity:     Worry: Not on file     Inability: Not on file     Transportation needs:     Medical: Not on file     Non-medical: Not on file   Tobacco Use     Smoking status: Former Smoker     Packs/day: 0.50     Years: 6.00     Pack years: 3.00     Types: Cigarettes     Start date: 1985     Last attempt to quit: 1992     Years since quittin.5     Smokeless tobacco: Never Used     Tobacco comment: no 2nd hand   Substance and Sexual Activity     Alcohol use: Yes     Alcohol/week: 1.8 - 3.6 oz     Types: 1 - 2 Glasses of wine, 1 - 2 Cans of beer, 1 - 2 Shots of liquor per week     Comment: occasional     Drug use: No      Sexual activity: Yes     Partners: Male     Birth control/protection: None     Comment: Hystectomy 1999   Lifestyle     Physical activity:     Days per week: Not on file     Minutes per session: Not on file     Stress: Not on file   Relationships     Social connections:     Talks on phone: Not on file     Gets together: Not on file     Attends Spiritism service: Not on file     Active member of club or organization: Not on file     Attends meetings of clubs or organizations: Not on file     Relationship status: Not on file     Intimate partner violence:     Fear of current or ex partner: Not on file     Emotionally abused: Not on file     Physically abused: Not on file     Forced sexual activity: Not on file   Other Topics Concern     Parent/sibling w/ CABG, MI or angioplasty before 65F 55M? No      Service Not Asked     Blood Transfusions No     Caffeine Concern Not Asked     Occupational Exposure Not Asked     Hobby Hazards Not Asked     Sleep Concern Not Asked     Stress Concern Not Asked     Weight Concern Not Asked     Special Diet Not Asked     Back Care Not Asked     Exercise Not Asked     Bike Helmet Not Asked     Seat Belt Yes     Self-Exams Not Asked   Social History Narrative     with 3 children and a dog.  No smoking, etoh or drug use.  Worked as a  for [employer] in [employer] in the past.  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       FAMILY HISTORY:  Family History   Problem Relation Age of Onset     Eye Disorder Father         cataract, detached retina     Myocardial Infarction Father 60     Lipids Father      Cerebrovascular Disease Father      Depression Father      Substance Abuse Father      Anesthesia Reaction Father         stroke right after surgery     Cataracts Father      Osteoarthritis Father      Ulcerative Colitis Father      Lipids Mother      Hypertension Mother      Thyroid Disease Mother      Depression Mother       "Angina Mother      GERD Mother      Skin Cancer Mother      Eye Disorder Son         ptosis     Eye Disorder Paternal Grandmother         cataract     Eye Disorder Paternal Grandfather         cataract     Diabetes Paternal Grandfather      Eye Disorder Maternal Grandmother         cataract     Thyroid Disease Maternal Grandmother      Coronary Artery Disease Sister         angioplasty     GERD Sister      Substance Abuse Sister      Depression Sister      Depression Son      Anxiety Disorder Son      Thyroid Disease Sister      Diabetes Maternal Grandfather      Depression Nephew      Anxiety Disorder Nephew      Thyroid Disease Nephew      Diabetes Type 2  Cousin         paternal cousin     Heart Disease Paternal Aunt      Diabetes Paternal Aunt      Diabetes Paternal Uncle      Heart Disease Paternal Uncle      Past/family/social history reviewed and no changes    PHYSICAL EXAMINATION:  Constitutional: aaox3, cooperative, pleasant, not dyspneic/diaphoretic, no acute distress  Vitals reviewed: /67 (BP Location: Left arm, Patient Position: Sitting, Cuff Size: Adult Regular)   Pulse 96   Temp 98.5  F (36.9  C) (Oral)   Ht 1.712 m (5' 7.4\")   Wt 71.2 kg (156 lb 14.4 oz)   SpO2 97%   BMI 24.28 kg/m     Wt:   Wt Readings from Last 2 Encounters:   07/02/19 71.2 kg (156 lb 14.4 oz)   04/05/19 70.8 kg (156 lb)      Eyes: Sclera anicteric/injected  Ears/nose/mouth/throat: Normal oropharynx without ulcers or exudate, mucus membranes moist, hearing intact  Neck: supple, thyroid normal size  CV: No edema  Respiratory: Unlabored breathing  Abdominal: soft non-tender to palpation, no hepatosplenomegaly   Skin: warm, perfused, no jaundice  Psych: Normal affect  MSK: Normal gait      PERTINENT STUDIES:  Orders Only on 09/13/2018   Component Date Value Ref Range Status     WBC 09/13/2018 5.8  4.0 - 11.0 10e9/L Final     RBC Count 09/13/2018 4.51  3.8 - 5.2 10e12/L Final     Hemoglobin 09/13/2018 14.1  11.7 - 15.7 g/dL " Final     Hematocrit 09/13/2018 42.1  35.0 - 47.0 % Final     MCV 09/13/2018 93  78 - 100 fl Final     MCH 09/13/2018 31.3  26.5 - 33.0 pg Final     MCHC 09/13/2018 33.5  31.5 - 36.5 g/dL Final     RDW 09/13/2018 13.2  10.0 - 15.0 % Final     Platelet Count 09/13/2018 430  150 - 450 10e9/L Final     Diff Method 09/13/2018 Automated Method   Final     % Neutrophils 09/13/2018 37.1  % Final     % Lymphocytes 09/13/2018 40.5  % Final     % Monocytes 09/13/2018 10.5  % Final     % Eosinophils 09/13/2018 9.8  % Final     % Basophils 09/13/2018 2.1  % Final     % Immature Granulocytes 09/13/2018 0.0  % Final     Nucleated RBCs 09/13/2018 0  0 /100 Final     Absolute Neutrophil 09/13/2018 2.2  1.6 - 8.3 10e9/L Final     Absolute Lymphocytes 09/13/2018 2.4  0.8 - 5.3 10e9/L Final     Absolute Monocytes 09/13/2018 0.6  0.0 - 1.3 10e9/L Final     Absolute Eosinophils 09/13/2018 0.6  0.0 - 0.7 10e9/L Final     Absolute Basophils 09/13/2018 0.1  0.0 - 0.2 10e9/L Final     Abs Immature Granulocytes 09/13/2018 0.0  0 - 0.4 10e9/L Final     Absolute Nucleated RBC 09/13/2018 0.0   Final     Sodium 09/13/2018 138  133 - 144 mmol/L Final     Potassium 09/13/2018 4.3  3.4 - 5.3 mmol/L Final     Chloride 09/13/2018 102  94 - 109 mmol/L Final     Carbon Dioxide 09/13/2018 30  20 - 32 mmol/L Final     Anion Gap 09/13/2018 5  3 - 14 mmol/L Final     Glucose 09/13/2018 119* 70 - 99 mg/dL Final     Urea Nitrogen 09/13/2018 12  7 - 30 mg/dL Final     Creatinine 09/13/2018 0.83  0.52 - 1.04 mg/dL Final     GFR Estimate 09/13/2018 72  >60 mL/min/1.7m2 Final     GFR Estimate If Black 09/13/2018 87  >60 mL/min/1.7m2 Final     Calcium 09/13/2018 9.2  8.5 - 10.1 mg/dL Final     Bilirubin Direct 09/13/2018 0.2  0.0 - 0.2 mg/dL Final     Bilirubin Total 09/13/2018 0.8  0.2 - 1.3 mg/dL Final     Albumin 09/13/2018 3.9  3.4 - 5.0 g/dL Final     Protein Total 09/13/2018 7.8  6.8 - 8.8 g/dL Final     Alkaline Phosphatase 09/13/2018 145  40 - 150 U/L  Final     ALT 09/13/2018 59* 0 - 50 U/L Final     AST 09/13/2018 39  0 - 45 U/L Final     IGG 09/13/2018 1160  695 - 1620 mg/dL Final     IgG1 09/13/2018 416  300 - 856 mg/dL Final     IgG2 09/13/2018 384  158 - 761 mg/dL Final     IgG3 09/13/2018 83  24 - 192 mg/dL Final     IgG4 09/13/2018 95* 11 - 86 mg/dL Final     Rosanne Marti PA-C

## 2019-07-02 NOTE — NURSING NOTE
"Chief Complaint   Patient presents with     RECHECK     Follow up for constipation and med check.       Vitals:    07/02/19 1253   BP: 101/67   BP Location: Left arm   Patient Position: Sitting   Cuff Size: Adult Regular   Pulse: 96   Temp: 98.5  F (36.9  C)   TempSrc: Oral   SpO2: 97%   Weight: 71.2 kg (156 lb 14.4 oz)   Height: 1.712 m (5' 7.4\")       Body mass index is 24.28 kg/m .                   Beatriz OLIVAREZ MA    "

## 2019-07-22 DIAGNOSIS — E89.1 POST-PANCREATECTOMY DIABETES (H): ICD-10-CM

## 2019-07-22 DIAGNOSIS — Z90.410 POST-PANCREATECTOMY DIABETES (H): ICD-10-CM

## 2019-07-22 DIAGNOSIS — K86.1 ACUTE ON CHRONIC PANCREATITIS (H): ICD-10-CM

## 2019-07-22 DIAGNOSIS — E13.9 POST-PANCREATECTOMY DIABETES (H): ICD-10-CM

## 2019-07-22 DIAGNOSIS — K85.90 ACUTE ON CHRONIC PANCREATITIS (H): ICD-10-CM

## 2019-09-13 ENCOUNTER — ANCILLARY PROCEDURE (OUTPATIENT)
Dept: MAMMOGRAPHY | Facility: CLINIC | Age: 54
End: 2019-09-13
Attending: NURSE PRACTITIONER
Payer: COMMERCIAL

## 2019-09-13 ENCOUNTER — OFFICE VISIT (OUTPATIENT)
Dept: RHEUMATOLOGY | Facility: CLINIC | Age: 54
End: 2019-09-13
Attending: INTERNAL MEDICINE
Payer: COMMERCIAL

## 2019-09-13 VITALS
SYSTOLIC BLOOD PRESSURE: 111 MMHG | TEMPERATURE: 98.2 F | OXYGEN SATURATION: 97 % | HEART RATE: 89 BPM | WEIGHT: 158.4 LBS | DIASTOLIC BLOOD PRESSURE: 76 MMHG | BODY MASS INDEX: 24.52 KG/M2

## 2019-09-13 DIAGNOSIS — D89.84 IGG4-RELATED SCLEROSING DISEASE (H): Primary | ICD-10-CM

## 2019-09-13 DIAGNOSIS — Z12.31 VISIT FOR SCREENING MAMMOGRAM: ICD-10-CM

## 2019-09-13 DIAGNOSIS — D89.84 IGG4-RELATED SCLEROSING DISEASE (H): ICD-10-CM

## 2019-09-13 DIAGNOSIS — Z29.9 ENCOUNTER FOR PREVENTIVE MEASURE: ICD-10-CM

## 2019-09-13 LAB
ALBUMIN SERPL-MCNC: 4.2 G/DL (ref 3.4–5)
ALP SERPL-CCNC: 113 U/L (ref 40–150)
ALT SERPL W P-5'-P-CCNC: 74 U/L (ref 0–50)
ANION GAP SERPL CALCULATED.3IONS-SCNC: 4 MMOL/L (ref 3–14)
AST SERPL W P-5'-P-CCNC: 45 U/L (ref 0–45)
BASOPHILS # BLD AUTO: 0.1 10E9/L (ref 0–0.2)
BASOPHILS NFR BLD AUTO: 2.1 %
BILIRUB SERPL-MCNC: 0.8 MG/DL (ref 0.2–1.3)
BUN SERPL-MCNC: 14 MG/DL (ref 7–30)
CALCIUM SERPL-MCNC: 9.1 MG/DL (ref 8.5–10.1)
CHLORIDE SERPL-SCNC: 104 MMOL/L (ref 94–109)
CO2 SERPL-SCNC: 29 MMOL/L (ref 20–32)
CREAT SERPL-MCNC: 0.76 MG/DL (ref 0.52–1.04)
DIFFERENTIAL METHOD BLD: NORMAL
EOSINOPHIL # BLD AUTO: 0.3 10E9/L (ref 0–0.7)
EOSINOPHIL NFR BLD AUTO: 5.2 %
ERYTHROCYTE [DISTWIDTH] IN BLOOD BY AUTOMATED COUNT: 13.5 % (ref 10–15)
GFR SERPL CREATININE-BSD FRML MDRD: 89 ML/MIN/{1.73_M2}
GLUCOSE SERPL-MCNC: 93 MG/DL (ref 70–99)
HCT VFR BLD AUTO: 40.9 % (ref 35–47)
HGB BLD-MCNC: 13.7 G/DL (ref 11.7–15.7)
IMM GRANULOCYTES # BLD: 0 10E9/L (ref 0–0.4)
IMM GRANULOCYTES NFR BLD: 0 %
LYMPHOCYTES # BLD AUTO: 2.1 10E9/L (ref 0.8–5.3)
LYMPHOCYTES NFR BLD AUTO: 40.6 %
MCH RBC QN AUTO: 32.4 PG (ref 26.5–33)
MCHC RBC AUTO-ENTMCNC: 33.5 G/DL (ref 31.5–36.5)
MCV RBC AUTO: 97 FL (ref 78–100)
MONOCYTES # BLD AUTO: 0.7 10E9/L (ref 0–1.3)
MONOCYTES NFR BLD AUTO: 12.8 %
NEUTROPHILS # BLD AUTO: 2 10E9/L (ref 1.6–8.3)
NEUTROPHILS NFR BLD AUTO: 39.3 %
NRBC # BLD AUTO: 0 10*3/UL
NRBC BLD AUTO-RTO: 0 /100
PLATELET # BLD AUTO: 336 10E9/L (ref 150–450)
POTASSIUM SERPL-SCNC: 4.1 MMOL/L (ref 3.4–5.3)
PROT SERPL-MCNC: 7.4 G/DL (ref 6.8–8.8)
RBC # BLD AUTO: 4.23 10E12/L (ref 3.8–5.2)
SODIUM SERPL-SCNC: 138 MMOL/L (ref 133–144)
WBC # BLD AUTO: 5.2 10E9/L (ref 4–11)

## 2019-09-13 PROCEDURE — 82784 ASSAY IGA/IGD/IGG/IGM EACH: CPT | Performed by: INTERNAL MEDICINE

## 2019-09-13 PROCEDURE — 82787 IGG 1 2 3 OR 4 EACH: CPT | Performed by: INTERNAL MEDICINE

## 2019-09-13 PROCEDURE — 80053 COMPREHEN METABOLIC PANEL: CPT | Performed by: INTERNAL MEDICINE

## 2019-09-13 PROCEDURE — 25000581 ZZH RX MED A9270 GY (STAT IND- M) 250: Mod: ZF | Performed by: INTERNAL MEDICINE

## 2019-09-13 PROCEDURE — 90750 HZV VACC RECOMBINANT IM: CPT | Mod: ZF | Performed by: INTERNAL MEDICINE

## 2019-09-13 PROCEDURE — 85025 COMPLETE CBC W/AUTO DIFF WBC: CPT | Performed by: INTERNAL MEDICINE

## 2019-09-13 PROCEDURE — 36415 COLL VENOUS BLD VENIPUNCTURE: CPT | Performed by: INTERNAL MEDICINE

## 2019-09-13 RX ADMIN — ZOSTER VACCINE RECOMBINANT, ADJUVANTED 0.5 ML: KIT at 15:29

## 2019-09-13 ASSESSMENT — PAIN SCALES - GENERAL: PAINLEVEL: NO PAIN (0)

## 2019-09-13 NOTE — LETTER
9/13/2019       RE: Dinora Mcghee  [address] [facility] [address]     Dear Colleague,    Thank you for referring your patient, Dinora Mcghee, to the [facility] RHEUMATOLOGY at [facility]. Please see a copy of my visit note below.    Rheumatology Clinic Visit Note     Dinora Mcghee MRN# [id number]   YOB: 1965 Age: 54 year old     Date of Visit: 09/13/2019   Last seen: 9/14/2018  Primary care provider: Justyna Lawson  Referring Provider: Dr. Ara Lugo with GI  Reason for Referral: Further evaluation and management for possible Sjogren's vs IgG4 related disease in patient with sicca symptoms and evidence of isolated? IgG4 in liver/pancreas.           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   # h/o endometriosis  # h/o pituitary mass with 2/2 DI now s/p transsphenoidal resection in 1990  # hypothyroidism on synthroid.  # Fatigue in ill state    Discussion:  55 y/o  female with longstanding history of chronic pancreatitis s/p pancreatectomy with islet autotransplant 12/2016 who is overall improving but found to have elevated serum and > 60 HPF cells on liver biopsy staining. She has sicca symptoms for the past 5 years but no evidence of Sjogren's vs IgG4 related disease on lip biopsy. No other evidence of IgG4 related activity with no RPF, aortitis, orbital disease, thyroid, pulmonary, or kidney disease. Interestingly, IgG4 disease can impact the hypophysis and she does have a history of pituitary issues back in 1990. Recent meta analysis assessing IgG4 serum sensitivity is 87.2% with 82% specificity (1). Her biopsy containing > 50 IgG4 staining plasma cells is highly suggestive  of IgG4 related disease as well and meets recent proposed pathologic recommendations of IgG4 related disease (2). She meets many of the features for IgG4 related disease including tissue biopsy.     In regards to management of her IgG4 Related disease there are no randomized control trials and all medicines are investigational. The most recent evidence highlights that the IgG4 antibodies are pathologic and that medications that suppress B lymphocytes like CD-20 cells (Rituximab) creating the antibodies are the preferred therapy. This is especially important if the organ dysfunction can be a critical organ like the liver in a patient with recent surgeries and transplant. Currently her liver dysfunction has normalized and there is no evidence of ongoing IgG4 related disease at this time.     Overall, we agree with the high levels of stress and degree of fatigue with worsening HA during active work we recommend she go very slow with her part time work. She has been severely ill and it will take time to regain and retrain her stamina.      Today 9/13/2019: Doing well, stable, improving LFTs/IgG-4. Dr. Lugo is ok with me following LFTS and seeing pt PRN.      Plan:    Labs every 3 months, due in 12/2019    -- labs q3mo, due in Dec 2019, LFTs much improved  -- agree with plan to continue to monitor off immune suppressives given ongoing stability in liver labs and improving IgG4 levels.   -- If liver dysfunction returns would need to decide with GI if we suspect related to IgG4 and if so then therapeutic options for IgG4 related disease would normally be Rituximab preferred (but has steroids (can cause irreversible damage to islet cells) with infusion) vs MMF (no steroids but not ideal agent).  -- Patient deferred initiating Evoxac or restasis at this point in time.  -- discussed various lozenges that help sicca symptoms, was advised to try hydris colgate toothpaste  -- f/u with GI for N/V  -- she will need slow  reintroduction into work as she was severely ill.  --Shingrix vaccine. Recommended shingrix. CDC recommends this vaccine 2 doses,  by 2-6 months for adults 50 years and older. It is killed virus and ok to be used in immunocompromised patients. Shingrix reduces the risk of shingles and PHN by more than 90% in people 50 and older.     AE include: pain, redness and swelling at the inj site, myalgia, fatigue, headaches, shivering, fever and stomach upset.    Shingrix vaccine today and the second dose between 2-6 months (RN visit here or with your PCP)    Flu shot next with next physical    Follow up:  RTC in 12 months, unless new symptoms emerge.      Orders Placed This Encounter   Procedures     Hepatic panel     Immunoglobulin G subclasses     IgG     ADDENDUM: 9/17 labs showed NL IgG4 and further improvement of liver tests (AST/ALT).          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  Document previously scanned on 1/12/17.  Validation form completed and scanned.  Code Status reflects choices in most recent ACP document..  Confirmed/documented designated decision maker(s).  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 Provider to review and confirm.       GERD (gastroesophageal reflux disease) 12/01/2010     Priority: Medium     Lumbago 04/18/2005     Priority: Medium     History of L5-S1 degenerative disk disease.         Sprain and strain of other specified sites of hip and thigh 12/09/2002     Priority: Medium     Chondromalacia of patella 12/09/2002     Priority: Medium     Need for prophylactic immunotherapy      Priority: Medium     trees, grass, srw, dust mites, cat       Allergic rhinitis due to other allergen 12/21/2001     Priority: Medium     Hypothyroidism      Priority: Medium     Problem list name updated by automated process. Provider to review       Sensorineural hearing loss 01/10/2005     Priority: Medium     Problem list name updated by automated process. Provider to review       Vertiginous syndrome and labyrinthine disorder 01/10/2005     Priority: Medium     Problem list name updated by automated process. Provider to review              History of Present Illness:   Dinora Mcghee is a 52 year old female with a past medical history of hypothyroidism, h/o pituitary mass and secondary DI s/p transphenoidal resection 1990, HLD, left knee OA, lumbar DDD, chronic pancreatitis for 30 years, now s/p islet cell transplantation who presents today for further evaluation of elevated IgG4 found both in serum and liver biopsy as well as sicca symptoms.     She notes that she has had longstanding issues with pancreatitis without any mention of aortitis, RPF, urinary obstruction. She denies any history of cancers, lymphoma or leukemia. No family  "history of autoimmune conditions other than possible RA in her dad. She had distant DI secondary from pituitary mass (s/p removal and not needing hormones) and joint wise has longstanding OA of left knee. She mentions that for the past 5 years she has had dry eye requiring eye drops. Sometimes related to her allergies she has to now regularly use eye drops. \"Feels like sand\" is rubbing in eyes. She also noticed ~ 5 years ago dry mouth where a cracker would not dissolve. She has to constantly carry water and drink it to keep her mouth dry. She uses sugar free gum and lozenges. Most of her clinical symptom burden has been related to her pancreas with frequent ERCP and concern for Sphincter of Oddi dysfunction. She had issues with significant pancreatic insuffiencey with malnutrition and weight loss. She underwent islet cell autotransplantation 12/28/2017 and was found to have elevated IgG4 cells in her liver biopsy. Subsequent IgG4 levels were elevated.    Interim history: 6/20/2017 - 8/4/2017  Comes in today frustrated about the multiple denials from the insurance about Rituximab for her IgG4 Related disease. She has had recent elevations of her LFTs and mentions that the last 3 days she has started to feel worse like before her pancreatectomy. Some nausea, no vomiting, but her appetite is lower. She denies any jaundice, but does not itching of her skin. She denies any fevers, chills, but has had 1 day of watery, nonbloody diarrhea. She had a recent MRCP with signs of biliary dilatation at ducts within the liver. She has a new hyperenhancement lesion in her liver with stability of other lesions. She is following with Dr. Lugo within the next week. She mentions that her dry eyes are getting worse.     Interim history: 8/4/2017 - 10/20/2017  After further discussion with GI and SOT regarding therapies Dinora's LFTs normalized and the consensus was to monitor and hold on immune suppressive therapies given the side " effect profile. She has continued to improve and is now off insulin. She wonders about her known slow emptying an gastroparesis has been acting up and she has had issues with N/V the past 2 weeks particularly after she lays down (worse in AM, but best midday). Otherwise things have been going well, denies any infections and her shoulder is almost back to normal.     Interim History: 10/20/2017 - 3/23/2018  Returns today feeling better. She has adjusted her diet with great improvement. Has noticed some fatigue the last several weeks, but denies any infectious symptoms. Has intermittent sharp pains in her left lower rib chest wall. This has been responding to tylenol. Denies any fevers or chills. She has noticed fatigue and tries to be very active with walking 3 miles. She mentions that she has to take naps now she never had before. She has been back part time as well that has worsened her control over her HA as well as her stamina levels.     9/2018: Sicca sx are managable on biotene and systane.    Has RUQ pain and nausea x 7days. Has migraines today.     Today 9/13/2019:    Overall stable, tried to help pts with chronic illness as a .    Uses biotene products, chewing gums. Gastroparesis and GI is the same. Dryness of eyes is not so bad.    No rashes, hair loss, or oral ulcers.    Has plantar fascitis and knee crepitus.           Review of Systems:   Constitutional: denies fevers/chills, positive fatigue, patient is having weight gain s/p transplant.  Skin: denies rash, raynauds or alopecia  Eyes: denies hx of uvetitis, double vision, positive dry eyes  Ears/Nose/Throat:  denies recurrent URI or sinusitis, positive dry mouth, denies any oral or nasal ulcers  Respiratory: No shortness of breath, dyspnea on exertion, cough, or hemoptysis  Cardiovascular:  denies chest pain, palpitations, hx pleurisy  Gastrointestinal: denies diarrhea, blood in stool, hx of IBD, positive past pancreatitis, positive nausea, rare  vomiting, positive RUQ pain.  Genitourinary: denies hematuria.  Musculoskeletal: denies red, tender, swollen joints, left shoulder frozen shoulder since surgery almost normal.  Neurologic:  denies headaches, seizures, some numbness or tingling s/p abdominal surgery.  Psychiatric:  denies history of depression or mental illness  Hematologic/Lymphatic/Immunologic:  denies history of blood clots, easy bruising, bleeding.  Endocrine:  Positive hypothyroidism.          Past Medical History:     Past Medical History:   Diagnosis Date     Allergic rhinitis, cause unspecified      Allergy to other foods     strawberries, apples, celeries, alice, watermelon     Arthritis     left knee     Choledocholithiasis     long after cholecystectomy     Chronic abdominal pain      Chronic constipation      Chronic nausea      Chronic pancreatitis (H)      Degeneration of lumbar or lumbosacral intervertebral disc      Esophageal reflux     w/ hiatal hernia     Gastroparesis      Hiatal hernia      History of pituitary adenoma     s/p resection     Hypothyroidism      Migraines      Mild hyperlipidemia      On tube feeding diet     presence of GJ tube     Pancreatic disease     PD stricture, 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    Past Surgical History  Past Surgical History:   Procedure Laterality Date     ABDOMEN SURGERY      c sections: 93, 96, 98. endometriosis growth     APPENDECTOMY       C  DELIVERY ONLY       C  DELIVERY ONLY      repeat c section with incidental cystotomy with repair     C EXCIS PITUITARY,TRANSNASAL/SEPTAL      pituitary tumor removed for diabetes insipidus     C TOTAL ABDOM HYSTERECTOMY      w/ bilateral salpingoophorectomy      C WRIST ARTHROSCOP,RELEASE XVERS LIG Bilateral 08      SECTION        COLONOSCOPY  2014     ENDOSCOPIC RETROGRADE CHOLANGIOPANCREATOGRAM N/A 6/2/2015    Procedure: ENDOSCOPIC RETROGRADE CHOLANGIOPANCREATOGRAM;  Surgeon: Mandeep Park MD;  Location: [facility]     ENDOSCOPIC RETROGRADE CHOLANGIOPANCREATOGRAM N/A 2/9/2016    Procedure: COMBINED ENDOSCOPIC RETROGRADE CHOLANGIOPANCREATOGRAPHY, PLACE TUBE/STENT;  Surgeon: Mandeep Park MD;  Location: [facility]     ENDOSCOPIC RETROGRADE CHOLANGIOPANCREATOGRAM N/A 3/17/2016    Procedure: COMBINED ENDOSCOPIC RETROGRADE CHOLANGIOPANCREATOGRAPHY, REMOVE FOREIGN BODY OR STENT/TUBE;  Surgeon: Mandeep Park MD;  Location: [facility]     ENDOSCOPIC RETROGRADE CHOLANGIOPANCREATOGRAM N/A 8/2/2016    Procedure: COMBINED ENDOSCOPIC RETROGRADE CHOLANGIOPANCREATOGRAPHY, PLACE TUBE/STENT;  Surgeon: Mandeep Park MD;  Location: [facility]     ENDOSCOPIC RETROGRADE CHOLANGIOPANCREATOGRAM N/A 8/26/2016    Procedure: COMBINED ENDOSCOPIC RETROGRADE CHOLANGIOPANCREATOGRAPHY, REMOVE FOREIGN BODY OR STENT/TUBE;  Surgeon: Mandeep Park MD;  Location: [facility]     ENDOSCOPIC ULTRASOUND UPPER GASTROINTESTINAL TRACT (GI) N/A 10/3/2016    Procedure: ENDOSCOPIC ULTRASOUND, ESOPHAGOSCOPY / UPPER GASTROINTESTINAL TRACT (GI);  Surgeon: Guru Jose Rodas MD;  Location: [facility]     ESOPHAGOSCOPY, GASTROSCOPY, DUODENOSCOPY (EGD), COMBINED N/A 6/24/2015    Procedure: COMBINED ESOPHAGOSCOPY, GASTROSCOPY, DUODENOSCOPY (EGD), REMOVE FOREIGN BODY;  Surgeon: Mandeep Park MD;  Location: [facility]     ESOPHAGOSCOPY, GASTROSCOPY, DUODENOSCOPY (EGD), COMBINED N/A 10/25/2015    Procedure: COMBINED ESOPHAGOSCOPY, GASTROSCOPY, DUODENOSCOPY (EGD);  Surgeon: Sammy Amaro MD;  Location: [facility]     ESOPHAGOSCOPY, GASTROSCOPY, DUODENOSCOPY (EGD), COMBINED N/A 10/25/2015    Procedure: COMBINED ESOPHAGOSCOPY, GASTROSCOPY, DUODENOSCOPY (EGD), BIOPSY SINGLE OR MULTIPLE;  Surgeon: Sammy Amaro MD;  Location: [facility]      ESOPHAGOSCOPY, GASTROSCOPY, DUODENOSCOPY (EGD), DILATATION, COMBINED       EXCISE LESION TRUNK N/A 2017    Procedure: EXCISE LESION TRUNK;  Removal of Abdominal Foreign Body;  Surgeon: Nestor hPoenix MD;  [facility]     HC ESOPH/GAS REFLUX TEST W NASAL IMPED >1 HR N/A 2015    Procedure: ESOPHAGEAL IMPEDENCE FUNCTION TEST WITH 24 HOUR PH GREATER THAN 1 HOUR;  Surgeon: Thiago Apple MD;  Location: [facility]     HC UGI ENDOSCOPY DIAG W BIOPSY  08     HC UGI ENDOSCOPY DIAG W BIOPSY  12     HC UGI ENDOSCOPY W ESOPHAGEAL DILATION BALLOON <30MM  08     HC UGI ENDOSCOPY W EUS N/A 2015    Procedure: COMBINED ENDOSCOPIC ULTRASOUND, ESOPHAGOSCOPY, GASTROSCOPY, DUODENOSCOPY (EGD);  Surgeon: Wm Dueñas MD;  Location: [facility]     INJECT TRANSVERSUS ABDOMINIS PLANE (TAP) BLOCK BILATERAL Left 2016    Procedure: INJECT TRANSVERSUS ABDOMINIS PLANE (TAP) BLOCK BILATERAL;  Surgeon: Dickson Corrigan MD;  Location: [facility]     laparoscopic pineda       LAPAROSCOPIC HERNIORRHAPHY INCISIONAL N/A 2018    Procedure: LAPAROSCOPIC HERNIORRHAPHY INCISIONAL;  Laparoscopic Incisional Hernia Repair with Symbotex Mesh Implant;  Surgeon: Nestor Phoenix MD;  Location: [facility]     LAPAROSCOPIC PANCREATECTOMY, TRANSPLANT AUTO ISLET CELL N/A 2016    Procedure: LAPAROSCOPIC PANCREATECTOMY, TRANSPLANT AUTO ISLET CELL;  Surgeon: Nestor Phoenix MD;  Location: [facility]     transphenoidal pituitary resection              Social History:     Social History     Occupational History     Occupation: director     Employer: [employer]   Tobacco Use     Smoking status: Former Smoker     Packs/day: 0.50     Years: 6.00     Pack years: 3.00     Types: Cigarettes     Start date: 1985     Last attempt to quit: 1992     Years since quittin.7     Smokeless tobacco: Never Used     Tobacco comment: no 2nd hand   Substance and Sexual Activity     Alcohol use: Yes     Alcohol/week: 1.8 - 3.6 oz      Types: 1 - 2 Glasses of wine, 1 - 2 Cans of beer, 1 - 2 Shots of liquor per week     Comment: occasional     Drug use: No     Sexual activity: Yes     Partners: Male     Birth control/protection: None     Comment: Hystectomy 1999   Previous director at [employer], has been on disability since 8/1/2016. 6 years of smoking and quit in 1992. Denies any ETOH.  and lives in the [facility].          Family History:     Family History   Problem Relation Age of Onset     Eye Disorder Father         cataract, detached retina     Myocardial Infarction Father 60     Lipids Father      Cerebrovascular Disease Father      Depression Father      Substance Abuse Father      Anesthesia Reaction Father         stroke right after surgery     Cataracts Father      Osteoarthritis Father      Ulcerative Colitis Father      Lipids Mother      Hypertension Mother      Thyroid Disease Mother      Depression Mother      Angina Mother      GERD Mother      Skin Cancer Mother      Eye Disorder Son         ptosis     Eye Disorder Paternal Grandmother         cataract     Eye Disorder Paternal Grandfather         cataract     Diabetes Paternal Grandfather      Eye Disorder Maternal Grandmother         cataract     Thyroid Disease Maternal Grandmother      Coronary Artery Disease Sister         angioplasty     GERD Sister      Substance Abuse Sister      Depression Sister      Depression Son      Anxiety Disorder Son      Thyroid Disease Sister      Diabetes Maternal Grandfather      Depression Nephew      Anxiety Disorder Nephew      Thyroid Disease Nephew      Diabetes Type 2  Cousin         paternal cousin     Heart Disease Paternal Aunt      Diabetes Paternal Aunt      Diabetes Paternal Uncle      Heart Disease Paternal Uncle      Father with likely RA following with multiple rheumatologists. 3 children healthy. 1 sister with thyroid issues. Denies any Sjogren's SLE, Scleroderma.            Allergies:     Allergies   Allergen Reactions      Apple Anaphylaxis     Depakote [Divalproex Sodium] Other (See Comments)     Chest pain     Zithromax [Azithromycin Dihydrate] Anaphylaxis     Noted in 4/7/08 ER     Darvocet [Propoxyphene N-Apap] Itching     Morphine Nausea and Vomiting and Rash     Nalbuphine Hcl Rash     RASH :nubaine     Zosyn [Piperacillin-Tazobactam In D5w] Rash     Possible allergy, did have a diffuse rash that seemed drug related but could have also been related to soap in the hospital.      Bactrim [Sulfamethoxazole W-Trimethoprim] Other (See Comments) and Nausea and Vomiting     Severely low liver function.     Cats      Compazine [Prochlorperazine] Other (See Comments)     Twitching. Takes Benedryl and is fine     Corticosteroids Other (See Comments)     All oral,IV and injectable steroids are contraindicated (unless in life threatening situations) in Islet Auto transplant recipients. They can cause irreversible loss of islet cell function. Please contact patients transplant care coordinator Mecca Watkins RN BSN @ [phone]/Pager [phone], and Endocrinologist prior to administration.     Dust Mites      Grass      Prednisone Other (See Comments)     Insomnia       Ragweeds      Tape [Adhesive Tape] Blisters     Tramadol      Trees      Zofran [Ondansetron] Other (See Comments)     migraine     Flagyl [Metronidazole] Hives and Rash            Medications:     Current Outpatient Medications   Medication Sig Dispense Refill     acetaminophen 500 MG CAPS Take 2 mg by mouth 2 times daily       amitriptyline (ELAVIL) 10 MG tablet Take 4 tablets (40 mg) by mouth At Bedtime Take 40 mg at bedtime 120 tablet 11     amitriptyline (ELAVIL) 50 MG tablet Take 1 tablet (50 mg) by mouth At Bedtime (Patient taking differently: Take 40 mg by mouth At Bedtime ) 60 tablet 3     amylase-lipase-protease (CREON 24) 77429-30235 units CPEP per EC capsule Take 3-4 capsules by mouth with meals and 1-2 with snacks. Maximum 15 capsules per day. 450 capsule 11      aspirin 81 MG EC tablet Take 1 tablet (81 mg) by mouth daily 90 tablet 3     azelastine (ASTELIN) 0.1 % spray Spray 1 spray into both nostrils 2 times daily       blood glucose (PAT CONTOUR NEXT) test strip Use to test blood sugar 6 times daily or as directed. 2 Box 11     blood glucose monitoring (PAT MICROLET) lancets Use to test blood sugar 6-8 times daily or as directed. 100 each 11     calcium carbonate-vitamin D (CALCIUM 500/D) 500-200 MG-UNIT tablet        cetirizine (ZYRTEC) 10 MG tablet Take 10 mg by mouth daily       diphenhydrAMINE (BENADRYL ALLERGY) 25 MG capsule Take 1 capsule (25 mg) by mouth every 6 hours as needed for itching or allergies 56 capsule 0     docusate sodium (COLACE) 100 MG capsule Take 1 capsule (100 mg) by mouth 2 times daily as needed for constipation, 60 capsule 0     estradiol (VAGIFEM) 10 MCG TABS vaginal tablet        fish oil-omega-3 fatty acids 1000 MG capsule        FOLIC ACID PO Take 1 mg by mouth daily       glucose 40 % GEL gel Take 15-30 g by mouth every 15 minutes as needed for low blood sugar 3 Tube 2     ibuprofen (ADVIL/MOTRIN) 400 MG tablet Take 1 tablet (400 mg) by mouth every 6 hours as needed for moderate pain 30 tablet 0     levothyroxine (SYNTHROID) 137 MCG tablet Take 1 tablet (137 mcg) by mouth daily 90 tablet 3     lubiprostone (AMITIZA) 8 MCG capsule Take 5 capsules (40 mcg) by mouth daily Take 3 capsules in AM and 2 capsules in  capsule 3     magnesium oxide 200 MG TABS        multivitamin CF formula (CHOICEFUL) capsule Take 1 tablet by mouth daily  11     omeprazole (PRILOSEC) 40 MG capsule Take 1 capsule (40 mg) by mouth daily 90 capsule 3     order for DME Equipment being ordered:Cefaly 1 Device 0     prochlorperazine (COMPAZINE) 5 MG tablet Take two 5 mg tablets by mouth every six hours as needed for nausea. 90 tablet 3     promethazine (PHENERGAN) 25 MG tablet Take 1 tablet (25 mg) by mouth At Bedtime 60 tablet 3     ranitidine (ZANTAC) 150  MG tablet Take 2 tablets (300 mg) by mouth daily 180 tablet 3     ranitidine (ZANTAC) 150 MG tablet Take 1 tablet (150 mg) by mouth At Bedtime 90 tablet 3     senna-docusate (SENOKOT-S;PERICOLACE) 8.6-50 MG per tablet Take 1-2 tablets by mouth 2 times daily 100 tablet 0     Sharps Container (BD SHARPS ) MISC 1 Container as needed 1 each 1     ZOLMitriptan (ZOMIG) 5 MG tablet Take 1 tablet (5 mg) by mouth at onset of headache for migraine 9 tablet 3            Physical Exam:   Blood pressure 111/76, pulse 89, temperature 98.2  F (36.8  C), temperature source Oral, weight 71.8 kg (158 lb 6.4 oz), SpO2 97 %, not currently breastfeeding.  Wt Readings from Last 4 Encounters:   09/17/19 70.9 kg (156 lb 3.2 oz)   09/13/19 71.8 kg (158 lb 6.4 oz)   07/02/19 71.2 kg (156 lb 14.4 oz)   04/05/19 70.8 kg (156 lb)     Ideal body weight: 62.5 kg (137 lb 13.3 oz)  Adjusted ideal body weight: 66 kg (145 lb 7.3 oz)    Constitutional: well-developed, appearing stated age; cooperative, pleasant  Eyes: nl EOM, conjunctiva, sclera. No icterus.   ENT: nl external ears, nose, lips, teeth, gums, throat  No mucous membrane lesions, poor saliva pool, no parotid enlargement  Neck: no mass or thyroid enlargement  Resp: lungs clear to auscultation,  CV: RRR, no murmurs, rubs or gallops, no edema  GI: NT, soft  Lymph: no cervical, supraclavicular nodes  MS: no active synovitis or joint tenderness  Skin: no nail pitting, alopecia, rash, nodules or lesions  Neuro: nl cranial nerves, strength in both proximal and distal UE and LE.   Psych: nl affect.         Data:     Results for orders placed or performed in visit on 06/25/19   Immunoglobulin G subclasses   Result Value Ref Range     695 - 1,620 mg/dL    IgG1 417 300 - 856 mg/dL    IgG2 364 158 - 761 mg/dL    IgG3 67 24 - 192 mg/dL    IgG4 89 (H) 11 - 86 mg/dL   Hepatic panel   Result Value Ref Range    Bilirubin Direct 0.2 0.0 - 0.2 mg/dL    Bilirubin Total 0.7 0.2 - 1.3 mg/dL     Albumin 4.0 3.4 - 5.0 g/dL    Protein Total 7.4 6.8 - 8.8 g/dL    Alkaline Phosphatase 123 40 - 150 U/L    ALT 65 (H) 0 - 50 U/L    AST 56 (H) 0 - 45 U/L     *Note: Due to a large number of results and/or encounters for the requested time period, some results have not been displayed. A complete set of results can be found in Results Review.       Recent Labs   Lab Test  04/25/17   1355  04/04/17   0757  02/21/17   1315   12/29/16   0620   06/06/15   1903   10/23/12   1451   WBC  4.4  5.0  4.7   < >  10.7   < >   --    < >   --    RBC  4.38  4.04  4.28   < >  3.13*   < >   --    < >   --    HGB  11.1*  10.4*  11.6*   < >  9.1*   < >   --    < >   --    HCT  36.1  33.4*  37.1   < >  27.7*   < >   --    < >   --    MCV  82  83  87   < >  89   < >   --    < >   --    RDW  17.5*  15.8*  13.0   < >  13.2   < >   --    < >   --    PLT  543*  482*  733*   < >  107*   < >   --    < >   --    ALBUMIN  3.7  3.2*  3.7   < >  2.9*   < >  2.8*   < >  4.4   CRP   --    --    --    --   90.0*   --   71.0*   --   7.2   BUN  16  17  13   < >  7   < >  2*   < >   --     < > = values in this interval not displayed.      Recent Labs   Lab Test  12/20/16   1121  10/23/15   1554  10/09/13   0721   06/25/09   1423   TSH  0.13*  0.57  1.86   < >  0.37*   T4  1.17   --    --    --   1.17    < > = values in this interval not displayed.     Hemoglobin   Date Value Ref Range Status   04/05/2019 14.0 11.7 - 15.7 g/dL Final   01/24/2019 12.4 11.7 - 15.7 g/dL Final   12/17/2018 13.1 11.7 - 15.7 g/dL Final     Urea Nitrogen   Date Value Ref Range Status   04/05/2019 17 7 - 30 mg/dL Final   01/24/2019 17 7 - 30 mg/dL Final   12/17/2018 9 7 - 30 mg/dL Final     Creatinine   Date Value Ref Range Status   05/20/2017 0.6 0.52 - 1.04 mg/dL Final     Sed Rate   Date Value Ref Range Status   10/23/2012 11 0 - 20 mm/h Final   07/27/2006 9 0 - 20 mm/h Final     CRP Inflammation   Date Value Ref Range Status   12/29/2016 90.0 (H) 0.0 - 8.0 mg/L Final    06/06/2015 71.0 (H) 0.0 - 8.0 mg/L Final   10/23/2012 7.2 0.0 - 8.0 mg/L Final     AST   Date Value Ref Range Status   06/25/2019 56 (H) 0 - 45 U/L Final   04/05/2019 46 (H) 0 - 45 U/L Final   01/24/2019 31 0 - 45 U/L Final     Albumin   Date Value Ref Range Status   06/25/2019 4.0 3.4 - 5.0 g/dL Final   04/05/2019 3.9 3.4 - 5.0 g/dL Final   01/24/2019 3.4 3.4 - 5.0 g/dL Final     Alkaline Phosphatase   Date Value Ref Range Status   06/25/2019 123 40 - 150 U/L Final   04/05/2019 121 40 - 150 U/L Final   01/24/2019 110 40 - 150 U/L Final     ALT   Date Value Ref Range Status   06/25/2019 65 (H) 0 - 50 U/L Final   04/05/2019 59 (H) 0 - 50 U/L Final   01/24/2019 48 0 - 50 U/L Final     Rheumatoid Factor   Date Value Ref Range Status   04/10/2017 <20 <20 IU/mL Final     Recent Labs   Lab Test 06/25/19  1420 04/05/19  1316 02/19/19  1651 01/24/19  0740 12/17/18  1849  03/23/18  0958  12/20/16  1121   WBC  --  6.2  --  5.5 5.8   < > 4.1   < >  --    HGB  --  14.0  --  12.4 13.1   < > 13.9   < >  --    HCT  --  42.4  --  39.0 40.6   < > 41.7   < >  --    MCV  --  94  --  96 94   < > 94   < >  --    PLT  --  377  --  399 377   < > 382   < >  --    BUN  --  17  --  17 9   < > 12   < >  --    TSH  --   --  1.83  --   --   --  0.74  --  0.13*   AST 56* 46*  --  31 42   < > 44   < >  --    ALT 65* 59*  --  48 60*   < > 64*   < >  --    ALKPHOS 123 121  --  110 142   < > 160*   < >  --     < > = values in this interval not displayed.     AMARI undetected  SSA, SSB, cryoglobulin all negative  Normal C3, C4  RF undetected  IGG subclasses with mild IGG4 elevation to 146H -> 89 -> 70s  SPEP with normal pattern and no monoclonal protein seen.    Recent LFT normal with declining Alk Phos. Lowest in past year  IgG4 normalizing    LFTs, CBC, IgG subclasses normal 1/2018.    Other studies:   Tissue biopsy 12/28/2016:  FINAL DIAGNOSIS:   A. Spleen, splenectomy:   - Splenic tissue with no significant histologic abnormality     B. Duodenum  and pancreas, resection:   - Chronic pancreatitis   - Duodenum with no significant histologic abnormality     C. Pancreas, uncinate process, biopsy:   - Chronic pancreatitis     D. Liver, dome lesion, needle core biopsy:   - Liver with spindle cell proliferation/lesion, acute and chronic   inflammation and fibrosis   - Focally increased IgG4 positive plasma cells (upto 60/HPF)   - See comment     E. Pancreas, head, biopsy:   - Chronic pancreatitis     F. Pancreas, tail, biopsy:   - Chronic pancreatitis     G. Pancreas, body, biopsy:   - Chronic pancreatitis with fat necrosis     COMMENT:   Sections of the liver lesion show stromal proliferation composed of   bland spindle cell bundle and whorls with associated fibrosis   infiltrated by neutrophils, lymphocytes, plasma cells and macrophages.   Occasional lymphoid aggregates are noted. The surrounding liver   parenchymal tissue show diffuse moderate portal inflammation composed of   lymphocytes and plasma cells.  There is also a mild to moderate   infiltrate of neutrophils.  Reticulin stain shows preserved reticulin   framework.  Trichrome stains highlight the spindle cell proliferation   and show mild portal fibrosis.  Immunostain are performed with   appropriate controls. The spindle cell proliferation is negative for ALK   and show patchy infiltrate of IgG4 positive plasma cells (upto 60/HPF).   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 Possibility of IgG4-related disease cannot be ruled out.   Clinical and radiologic correlation is recommended.     MRI Abdomen:4/22/2017  IMPRESSION:  1. 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  2. No significant change in small wedge-shaped peripheral areas of  persistent enhancement surrounding mildly dilated biliary radicles,  findings most compatible with cholangitis or sequela of previous  infection.  3. Nonspecific subcutaneous fluid collection  in the left upper  quadrant amidst the abdominal wall postsurgical changes.    Reviewed Rheumatology lab flowsheet    MRCP 7/13/2017  IMPRESSION:  1. Findings consistent with hemosiderin deposition within the liver.  2. There is improvement in the heterogeneous enhancement seen on  comparison exam, though patchy areas of increased T2 signal and  persistent enhancement are seen in the liver suggestive of regions of  fibrosis. Mildly dilated bile ducts in the regions of the liver which  appears somewhat fibrotic, overall similar to prior exam.  3. Postsurgical changes of pancreatectomy, splenectomy and  cholecystectomy.    Lip Biopsy 5/31/2017:  FINAL DIAGNOSIS:   Lip biopsy, lower:   -  Benign minor salivary glands with normal lobular architecture, see   comment     COMMENT:   There is focal mild chronic inflammation.  There is no morphologic   evidence of IgG-4 related disease or Sjogren's disease.     Component      Latest Ref Rng & Units 7/31/2018 8/23/2018 8/23/2018 8/23/2018            5:30 AM 10:32 AM 10:35 AM   WBC      4.0 - 11.0 10e9/L 5.6      RBC Count      3.8 - 5.2 10e12/L 4.43      Hemoglobin      11.7 - 15.7 g/dL 13.7  13.0    Hematocrit      35.0 - 47.0 % 41.7  39.7    MCV      78 - 100 fl 94      MCH      26.5 - 33.0 pg 30.9      MCHC      31.5 - 36.5 g/dL 32.9      RDW      10.0 - 15.0 % 13.9      Platelet Count      150 - 450 10e9/L 377      Diff Method             % Neutrophils      %       % Lymphocytes      %       % Monocytes      %       % Eosinophils      %       % Basophils      %       % Immature Granulocytes      %       Nucleated RBCs      0 /100       Absolute Neutrophil      1.6 - 8.3 10e9/L       Absolute Lymphocytes      0.8 - 5.3 10e9/L       Absolute Monocytes      0.0 - 1.3 10e9/L       Absolute Eosinophils      0.0 - 0.7 10e9/L       Absolute Basophils      0.0 - 0.2 10e9/L       Abs Immature Granulocytes      0 - 0.4 10e9/L       Absolute Nucleated RBC             Sodium      133  - 144 mmol/L 138      Potassium      3.4 - 5.3 mmol/L 3.9      Chloride      94 - 109 mmol/L 105      Carbon Dioxide      20 - 32 mmol/L 28      Anion Gap      3 - 14 mmol/L 5      Glucose      70 - 99 mg/dL 91 87  94   Urea Nitrogen      7 - 30 mg/dL 14      Creatinine      0.52 - 1.04 mg/dL 0.68      GFR Estimate      >60 mL/min/1.7m2 >90      GFR Estimate If Black      >60 mL/min/1.7m2 >90      Calcium      8.5 - 10.1 mg/dL 9.0      Bilirubin Total      0.2 - 1.3 mg/dL 0.7      Albumin      3.4 - 5.0 g/dL 3.7      Protein Total      6.8 - 8.8 g/dL 7.6      Alkaline Phosphatase      40 - 150 U/L 170 (H)      ALT      0 - 50 U/L 77 (H)      AST      0 - 45 U/L 69 (H)      Color Urine       Straw      Appearance Urine       Clear      Glucose Urine      NEG:Negative mg/dL Negative      Bilirubin Urine      NEG:Negative Negative      Ketones Urine      NEG:Negative mg/dL Negative      Specific Gravity Urine      1.003 - 1.035 1.003      Blood Urine      NEG:Negative Negative      pH Urine      5.0 - 7.0 pH 7.0      Protein Albumin Urine      NEG:Negative mg/dL Negative      Urobilinogen mg/dL      0.0 - 2.0 mg/dL 0.0      Nitrite Urine      NEG:Negative Negative      Leukocyte Esterase Urine      NEG:Negative Negative      Source       Midstream Urine      WBC Urine      0 - 5 /HPF <1      RBC Urine      0 - 2 /HPF <1      Bacteria Urine      NEG:Negative /HPF Few (A)      Mucous Urine      NEG:Negative /LPF Present (A)      Bilirubin Direct      0.0 - 0.2 mg/dL       IGG      695 - 1620 mg/dL       IgG1      300 - 856 mg/dL       IgG2      158 - 761 mg/dL       IgG3      24 - 192 mg/dL       IgG4      11 - 86 mg/dL         Component      Latest Ref Rng & Units 8/23/2018 8/23/2018 8/24/2018          12:38 PM  2:46 PM    WBC      4.0 - 11.0 10e9/L      RBC Count      3.8 - 5.2 10e12/L      Hemoglobin      11.7 - 15.7 g/dL 13.0  13.5   Hematocrit      35.0 - 47.0 % 39.6  41.7   MCV      78 - 100 fl      MCH      26.5 -  33.0 pg      MCHC      31.5 - 36.5 g/dL      RDW      10.0 - 15.0 %      Platelet Count      150 - 450 10e9/L      Diff Method            % Neutrophils      %      % Lymphocytes      %      % Monocytes      %      % Eosinophils      %      % Basophils      %      % Immature Granulocytes      %      Nucleated RBCs      0 /100      Absolute Neutrophil      1.6 - 8.3 10e9/L      Absolute Lymphocytes      0.8 - 5.3 10e9/L      Absolute Monocytes      0.0 - 1.3 10e9/L      Absolute Eosinophils      0.0 - 0.7 10e9/L      Absolute Basophils      0.0 - 0.2 10e9/L      Abs Immature Granulocytes      0 - 0.4 10e9/L      Absolute Nucleated RBC            Sodium      133 - 144 mmol/L      Potassium      3.4 - 5.3 mmol/L      Chloride      94 - 109 mmol/L      Carbon Dioxide      20 - 32 mmol/L      Anion Gap      3 - 14 mmol/L      Glucose      70 - 99 mg/dL  104 (H) 85   Urea Nitrogen      7 - 30 mg/dL      Creatinine      0.52 - 1.04 mg/dL      GFR Estimate      >60 mL/min/1.7m2      GFR Estimate If Black      >60 mL/min/1.7m2      Calcium      8.5 - 10.1 mg/dL      Bilirubin Total      0.2 - 1.3 mg/dL      Albumin      3.4 - 5.0 g/dL      Protein Total      6.8 - 8.8 g/dL      Alkaline Phosphatase      40 - 150 U/L      ALT      0 - 50 U/L      AST      0 - 45 U/L      Color Urine            Appearance Urine            Glucose Urine      NEG:Negative mg/dL      Bilirubin Urine      NEG:Negative      Ketones Urine      NEG:Negative mg/dL      Specific Gravity Urine      1.003 - 1.035      Blood Urine      NEG:Negative      pH Urine      5.0 - 7.0 pH      Protein Albumin Urine      NEG:Negative mg/dL      Urobilinogen mg/dL      0.0 - 2.0 mg/dL      Nitrite Urine      NEG:Negative      Leukocyte Esterase Urine      NEG:Negative      Source            WBC Urine      0 - 5 /HPF      RBC Urine      0 - 2 /HPF      Bacteria Urine      NEG:Negative /HPF      Mucous Urine      NEG:Negative /LPF      Bilirubin Direct      0.0 - 0.2  mg/dL      IGG      695 - 1620 mg/dL      IgG1      300 - 856 mg/dL      IgG2      158 - 761 mg/dL      IgG3      24 - 192 mg/dL      IgG4      11 - 86 mg/dL        Component      Latest Ref Rng & Units 9/13/2018             WBC      4.0 - 11.0 10e9/L 5.8   RBC Count      3.8 - 5.2 10e12/L 4.51   Hemoglobin      11.7 - 15.7 g/dL 14.1   Hematocrit      35.0 - 47.0 % 42.1   MCV      78 - 100 fl 93   MCH      26.5 - 33.0 pg 31.3   MCHC      31.5 - 36.5 g/dL 33.5   RDW      10.0 - 15.0 % 13.2   Platelet Count      150 - 450 10e9/L 430   Diff Method       Automated Method   % Neutrophils      % 37.1   % Lymphocytes      % 40.5   % Monocytes      % 10.5   % Eosinophils      % 9.8   % Basophils      % 2.1   % Immature Granulocytes      % 0.0   Nucleated RBCs      0 /100 0   Absolute Neutrophil      1.6 - 8.3 10e9/L 2.2   Absolute Lymphocytes      0.8 - 5.3 10e9/L 2.4   Absolute Monocytes      0.0 - 1.3 10e9/L 0.6   Absolute Eosinophils      0.0 - 0.7 10e9/L 0.6   Absolute Basophils      0.0 - 0.2 10e9/L 0.1   Abs Immature Granulocytes      0 - 0.4 10e9/L 0.0   Absolute Nucleated RBC       0.0   Sodium      133 - 144 mmol/L 138   Potassium      3.4 - 5.3 mmol/L 4.3   Chloride      94 - 109 mmol/L 102   Carbon Dioxide      20 - 32 mmol/L 30   Anion Gap      3 - 14 mmol/L 5   Glucose      70 - 99 mg/dL 119 (H)   Urea Nitrogen      7 - 30 mg/dL 12   Creatinine      0.52 - 1.04 mg/dL 0.83   GFR Estimate      >60 mL/min/1.7m2 72   GFR Estimate If Black      >60 mL/min/1.7m2 87   Calcium      8.5 - 10.1 mg/dL 9.2   Bilirubin Total      0.2 - 1.3 mg/dL 0.8   Albumin      3.4 - 5.0 g/dL 3.9   Protein Total      6.8 - 8.8 g/dL 7.8   Alkaline Phosphatase      40 - 150 U/L 145   ALT      0 - 50 U/L 59 (H)   AST      0 - 45 U/L 39   Color Urine          Appearance Urine          Glucose Urine      NEG:Negative mg/dL    Bilirubin Urine      NEG:Negative    Ketones Urine      NEG:Negative mg/dL    Specific Gravity Urine      1.003 -  1.035    Blood Urine      NEG:Negative    pH Urine      5.0 - 7.0 pH    Protein Albumin Urine      NEG:Negative mg/dL    Urobilinogen mg/dL      0.0 - 2.0 mg/dL    Nitrite Urine      NEG:Negative    Leukocyte Esterase Urine      NEG:Negative    Source          WBC Urine      0 - 5 /HPF    RBC Urine      0 - 2 /HPF    Bacteria Urine      NEG:Negative /HPF    Mucous Urine      NEG:Negative /LPF    Bilirubin Direct      0.0 - 0.2 mg/dL 0.2   IGG      695 - 1620 mg/dL 1160   IgG1      300 - 856 mg/dL 416   IgG2      158 - 761 mg/dL 384   IgG3      24 - 192 mg/dL 83   IgG4      11 - 86 mg/dL 95 (H)     Component      Latest Ref Rng & Units 6/25/2019 9/13/2019   WBC      4.0 - 11.0 10e9/L  5.2   RBC Count      3.8 - 5.2 10e12/L  4.23   Hemoglobin      11.7 - 15.7 g/dL  13.7   Hematocrit      35.0 - 47.0 %  40.9   MCV      78 - 100 fl  97   MCH      26.5 - 33.0 pg  32.4   MCHC      31.5 - 36.5 g/dL  33.5   RDW      10.0 - 15.0 %  13.5   Platelet Count      150 - 450 10e9/L  336   Diff Method        Automated Method   % Neutrophils      %  39.3   % Lymphocytes      %  40.6   % Monocytes      %  12.8   % Eosinophils      %  5.2   % Basophils      %  2.1   % Immature Granulocytes      %  0.0   Nucleated RBCs      0 /100  0   Absolute Neutrophil      1.6 - 8.3 10e9/L  2.0   Absolute Lymphocytes      0.8 - 5.3 10e9/L  2.1   Absolute Monocytes      0.0 - 1.3 10e9/L  0.7   Absolute Eosinophils      0.0 - 0.7 10e9/L  0.3   Absolute Basophils      0.0 - 0.2 10e9/L  0.1   Abs Immature Granulocytes      0 - 0.4 10e9/L  0.0   Absolute Nucleated RBC        0.0   Sodium      133 - 144 mmol/L  138   Potassium      3.4 - 5.3 mmol/L  4.1   Chloride      94 - 109 mmol/L  104   Carbon Dioxide      20 - 32 mmol/L  29   Anion Gap      3 - 14 mmol/L  4   Glucose      70 - 99 mg/dL  93   Urea Nitrogen      7 - 30 mg/dL  14   Creatinine      0.52 - 1.04 mg/dL  0.76   GFR Estimate      >60 mL/min/1.73:m2  89   GFR Estimate If Black      >60  mL/min/1.73:m2  >90   Calcium      8.5 - 10.1 mg/dL  9.1   Bilirubin Total      0.2 - 1.3 mg/dL 0.7 0.8   Albumin      3.4 - 5.0 g/dL 4.0 4.2   Protein Total      6.8 - 8.8 g/dL 7.4 7.4   Alkaline Phosphatase      40 - 150 U/L 123 113   ALT      0 - 50 U/L 65 (H) 74 (H)   AST      0 - 45 U/L 56 (H) 45   Bilirubin Direct      0.0 - 0.2 mg/dL 0.2    IGG      695 - 1,620 mg/dL 963 976   IgG1      382 - 929 mg/dL 417 402   IgG2      242 - 700 mg/dL 364 337   IgG3      22 - 176 mg/dL 67 68   IgG4      4 - 86 mg/dL 89 (H) 87 (H)     Component      Latest Ref Rng & Units 9/17/2019   Bilirubin Direct      0.0 - 0.2 mg/dL 0.1   Bilirubin Total      0.2 - 1.3 mg/dL 0.5   Albumin      3.4 - 5.0 g/dL 4.0   Protein Total      6.8 - 8.8 g/dL 7.4   Alkaline Phosphatase      40 - 150 U/L 113   ALT      0 - 50 U/L 68 (H)   AST      0 - 45 U/L 32   IGG      695 - 1,620 mg/dL 953   IgG1      382 - 929 mg/dL 390   IgG2      242 - 700 mg/dL 349   IgG3      22 - 176 mg/dL 64   IgG4      4 - 86 mg/dL 86       Again, thank you for allowing me to participate in the care of your patient.      Sincerely,    Anita Becerra MD

## 2019-09-13 NOTE — PATIENT INSTRUCTIONS
Shingrix vaccine today and the second dose between 2-6 months (RN visit here or with your PCP)    Flu shot next with your physical    Labs every 3 months, due in 12/2019    Return in a year (if stable in 2 years, you could my chart and update me)

## 2019-09-13 NOTE — NURSING NOTE
"Chief Complaint   Patient presents with     RECHECK     Igg4     Vital signs:  Temp: 98.2  F (36.8  C) Temp src: Oral BP: 111/76 Pulse: 89     SpO2: 97 %       Weight: 71.8 kg (158 lb 6.4 oz)  Estimated body mass index is 24.52 kg/m  as calculated from the following:    Height as of 7/2/19: 1.712 m (5' 7.4\").    Weight as of this encounter: 71.8 kg (158 lb 6.4 oz).        Catarina Miranda, CMA    "

## 2019-09-13 NOTE — PROGRESS NOTES
Rheumatology Clinic Visit Note     Dinora Mcghee MRN# [id number]   YOB: 1965 Age: 54 year old     Date of Visit: 09/13/2019   Last seen: 9/14/2018  Primary care provider: Justyna Lawson  Referring Provider: Dr. Ara Lugo with GI  Reason for Referral: Further evaluation and management for possible Sjogren's vs IgG4 related disease in patient with sicca symptoms and evidence of isolated? IgG4 in liver/pancreas.           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   # h/o endometriosis  # h/o pituitary mass with 2/2 DI now s/p transsphenoidal resection in 1990  # hypothyroidism on synthroid.  # Fatigue in ill state    Discussion:  53 y/o  female with longstanding history of chronic pancreatitis s/p pancreatectomy with islet autotransplant 12/2016 who is overall improving but found to have elevated serum and > 60 HPF cells on liver biopsy staining. She has sicca symptoms for the past 5 years but no evidence of Sjogren's vs IgG4 related disease on lip biopsy. No other evidence of IgG4 related activity with no RPF, aortitis, orbital disease, thyroid, pulmonary, or kidney disease. Interestingly, IgG4 disease can impact the hypophysis and she does have a history of pituitary issues back in 1990. Recent meta analysis assessing IgG4 serum sensitivity is 87.2% with 82% specificity (1). Her biopsy containing > 50 IgG4 staining plasma cells is highly suggestive of IgG4 related disease as well and meets recent proposed pathologic recommendations of IgG4 related disease (2). She meets many of the features for IgG4 related disease including tissue biopsy.     In regards to management of her IgG4 Related disease there are no randomized control  trials and all medicines are investigational. The most recent evidence highlights that the IgG4 antibodies are pathologic and that medications that suppress B lymphocytes like CD-20 cells (Rituximab) creating the antibodies are the preferred therapy. This is especially important if the organ dysfunction can be a critical organ like the liver in a patient with recent surgeries and transplant. Currently her liver dysfunction has normalized and there is no evidence of ongoing IgG4 related disease at this time.     Overall, we agree with the high levels of stress and degree of fatigue with worsening HA during active work we recommend she go very slow with her part time work. She has been severely ill and it will take time to regain and retrain her stamina.      Today 9/13/2019: Doing well, stable, improving LFTs/IgG-4. Dr. Lugo is ok with me following LFTS and seeing pt PRN.      Plan:    Labs every 3 months, due in 12/2019    -- labs q3mo, due in Dec 2019, LFTs much improved  -- agree with plan to continue to monitor off immune suppressives given ongoing stability in liver labs and improving IgG4 levels.   -- If liver dysfunction returns would need to decide with GI if we suspect related to IgG4 and if so then therapeutic options for IgG4 related disease would normally be Rituximab preferred (but has steroids (can cause irreversible damage to islet cells) with infusion) vs MMF (no steroids but not ideal agent).  -- Patient deferred initiating Evoxac or restasis at this point in time.  -- discussed various lozenges that help sicca symptoms, was advised to try hydris colgate toothpaste  -- f/u with GI for N/V  -- she will need slow reintroduction into work as she was severely ill.  --Shingrix vaccine. Recommended shingrix. CDC recommends this vaccine 2 doses,  by 2-6 months for adults 50 years and older. It is killed virus and ok to be used in immunocompromised patients. Shingrix reduces the risk of shingles  and PHN by more than 90% in people 50 and older.     AE include: pain, redness and swelling at the inj site, myalgia, fatigue, headaches, shivering, fever and stomach upset.    Shingrix vaccine today and the second dose between 2-6 months (RN visit here or with your PCP)    Flu shot next with next physical    Follow up:  RTC in 12 months, unless new symptoms emerge.      Orders Placed This Encounter   Procedures     Hepatic panel     Immunoglobulin G subclasses     IgG     ADDENDUM: 9/17 labs showed NL IgG4 and further improvement of liver tests (AST/ALT).          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  Document previously scanned on 1/12/17.  Validation form completed and scanned.  Code Status reflects choices in most recent ACP document..  Confirmed/documented designated decision maker(s).  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 Provider to review and confirm.       GERD (gastroesophageal reflux disease) 12/01/2010     Priority: Medium     Lumbago 04/18/2005     Priority: Medium     History of L5-S1 degenerative disk disease.         Sprain and strain of other specified sites of hip and thigh 12/09/2002     Priority: Medium     Chondromalacia of patella 12/09/2002     Priority: Medium     Need for prophylactic immunotherapy      Priority: Medium     trees, grass, srw, dust mites, cat       Allergic rhinitis due to other allergen 12/21/2001     Priority: Medium     Hypothyroidism      Priority: Medium     Problem list name updated by automated process. Provider to review       Sensorineural hearing loss 01/10/2005     Priority: Medium     Problem list name updated by automated process. Provider to review       Vertiginous syndrome and labyrinthine disorder 01/10/2005     Priority: Medium     Problem list name updated by automated process. Provider to review              History of Present Illness:   Dinora Mcghee is a 52 year old female with a past medical history of hypothyroidism, h/o pituitary mass and secondary DI s/p transphenoidal resection 1990, HLD, left knee OA, lumbar DDD, chronic pancreatitis for 30 years, now s/p islet cell transplantation who presents today for further evaluation of elevated IgG4 found both in serum and liver biopsy as well as sicca symptoms.     She notes that she has had longstanding issues with pancreatitis without any mention of aortitis, RPF, urinary obstruction. She denies any history of cancers, lymphoma or leukemia. No family history of autoimmune conditions other than possible RA in her dad. She had distant DI secondary from pituitary mass (s/p removal and not needing hormones) and joint wise has longstanding OA of left knee. She mentions that for the past 5 years she has had dry eye requiring eye drops.  "Sometimes related to her allergies she has to now regularly use eye drops. \"Feels like sand\" is rubbing in eyes. She also noticed ~ 5 years ago dry mouth where a cracker would not dissolve. She has to constantly carry water and drink it to keep her mouth dry. She uses sugar free gum and lozenges. Most of her clinical symptom burden has been related to her pancreas with frequent ERCP and concern for Sphincter of Oddi dysfunction. She had issues with significant pancreatic insuffiencey with malnutrition and weight loss. She underwent islet cell autotransplantation 12/28/2017 and was found to have elevated IgG4 cells in her liver biopsy. Subsequent IgG4 levels were elevated.    Interim history: 6/20/2017 - 8/4/2017  Comes in today frustrated about the multiple denials from the insurance about Rituximab for her IgG4 Related disease. She has had recent elevations of her LFTs and mentions that the last 3 days she has started to feel worse like before her pancreatectomy. Some nausea, no vomiting, but her appetite is lower. She denies any jaundice, but does not itching of her skin. She denies any fevers, chills, but has had 1 day of watery, nonbloody diarrhea. She had a recent MRCP with signs of biliary dilatation at ducts within the liver. She has a new hyperenhancement lesion in her liver with stability of other lesions. She is following with Dr. Lugo within the next week. She mentions that her dry eyes are getting worse.     Interim history: 8/4/2017 - 10/20/2017  After further discussion with GI and SOT regarding therapies Dinora's LFTs normalized and the consensus was to monitor and hold on immune suppressive therapies given the side effect profile. She has continued to improve and is now off insulin. She wonders about her known slow emptying an gastroparesis has been acting up and she has had issues with N/V the past 2 weeks particularly after she lays down (worse in AM, but best midday). Otherwise things have " been going well, denies any infections and her shoulder is almost back to normal.     Interim History: 10/20/2017 - 3/23/2018  Returns today feeling better. She has adjusted her diet with great improvement. Has noticed some fatigue the last several weeks, but denies any infectious symptoms. Has intermittent sharp pains in her left lower rib chest wall. This has been responding to tylenol. Denies any fevers or chills. She has noticed fatigue and tries to be very active with walking 3 miles. She mentions that she has to take naps now she never had before. She has been back part time as well that has worsened her control over her HA as well as her stamina levels.     9/2018: Sicca sx are managable on biotene and systane.    Has RUQ pain and nausea x 7days. Has migraines today.     Today 9/13/2019:    Overall stable, tried to help pts with chronic illness as a .    Uses biotene products, chewing gums. Gastroparesis and GI is the same. Dryness of eyes is not so bad.    No rashes, hair loss, or oral ulcers.    Has plantar fascitis and knee crepitus.           Review of Systems:   Constitutional: denies fevers/chills, positive fatigue, patient is having weight gain s/p transplant.  Skin: denies rash, raynauds or alopecia  Eyes: denies hx of uvetitis, double vision, positive dry eyes  Ears/Nose/Throat:  denies recurrent URI or sinusitis, positive dry mouth, denies any oral or nasal ulcers  Respiratory: No shortness of breath, dyspnea on exertion, cough, or hemoptysis  Cardiovascular:  denies chest pain, palpitations, hx pleurisy  Gastrointestinal: denies diarrhea, blood in stool, hx of IBD, positive past pancreatitis, positive nausea, rare vomiting, positive RUQ pain.  Genitourinary: denies hematuria.  Musculoskeletal: denies red, tender, swollen joints, left shoulder frozen shoulder since surgery almost normal.  Neurologic:  denies headaches, seizures, some numbness or tingling s/p abdominal surgery.  Psychiatric:   denies history of depression or mental illness  Hematologic/Lymphatic/Immunologic:  denies history of blood clots, easy bruising, bleeding.  Endocrine:  Positive hypothyroidism.          Past Medical History:     Past Medical History:   Diagnosis Date     Allergic rhinitis, cause unspecified      Allergy to other foods     strawberries, apples, celeries, alice, watermelon     Arthritis     left knee     Choledocholithiasis     long after cholecystectomy     Chronic abdominal pain      Chronic constipation      Chronic nausea      Chronic pancreatitis (H)      Degeneration of lumbar or lumbosacral intervertebral disc      Esophageal reflux     w/ hiatal hernia     Gastroparesis      Hiatal hernia      History of pituitary adenoma     s/p resection     Hypothyroidism      Migraines      Mild hyperlipidemia      On tube feeding diet     presence of GJ tube     Pancreatic disease     PD stricture, 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    Past Surgical History  Past Surgical History:   Procedure Laterality Date     ABDOMEN SURGERY      c sections: 93, 96, 98. endometriosis growth     APPENDECTOMY       C  DELIVERY ONLY       C  DELIVERY ONLY      repeat c section with incidental cystotomy with repair     C EXCIS PITUITARY,TRANSNASAL/SEPTAL      pituitary tumor removed for diabetes insipidus     C TOTAL ABDOM HYSTERECTOMY      w/ bilateral salpingoophorectomy      C WRIST ARTHROSCOP,RELEASE XVERS LIG Bilateral 08      SECTION       COLONOSCOPY       ENDOSCOPIC RETROGRADE CHOLANGIOPANCREATOGRAM N/A 2015    Procedure: ENDOSCOPIC RETROGRADE CHOLANGIOPANCREATOGRAM;  Surgeon: Mandeep Park MD;  Location: [facility]     ENDOSCOPIC RETROGRADE CHOLANGIOPANCREATOGRAM N/A 2016    Procedure: COMBINED  ENDOSCOPIC RETROGRADE CHOLANGIOPANCREATOGRAPHY, PLACE TUBE/STENT;  Surgeon: Mandeep Park MD;  Location: [facility]     ENDOSCOPIC RETROGRADE CHOLANGIOPANCREATOGRAM N/A 3/17/2016    Procedure: COMBINED ENDOSCOPIC RETROGRADE CHOLANGIOPANCREATOGRAPHY, REMOVE FOREIGN BODY OR STENT/TUBE;  Surgeon: Mandeep Park MD;  Location: [facility]     ENDOSCOPIC RETROGRADE CHOLANGIOPANCREATOGRAM N/A 8/2/2016    Procedure: COMBINED ENDOSCOPIC RETROGRADE CHOLANGIOPANCREATOGRAPHY, PLACE TUBE/STENT;  Surgeon: Mandeep Park MD;  Location: [facility]     ENDOSCOPIC RETROGRADE CHOLANGIOPANCREATOGRAM N/A 8/26/2016    Procedure: COMBINED ENDOSCOPIC RETROGRADE CHOLANGIOPANCREATOGRAPHY, REMOVE FOREIGN BODY OR STENT/TUBE;  Surgeon: Mandeep Park MD;  Location: [facility]     ENDOSCOPIC ULTRASOUND UPPER GASTROINTESTINAL TRACT (GI) N/A 10/3/2016    Procedure: ENDOSCOPIC ULTRASOUND, ESOPHAGOSCOPY / UPPER GASTROINTESTINAL TRACT (GI);  Surgeon: Guru Jose Rodas MD;  Location: [facility]     ESOPHAGOSCOPY, GASTROSCOPY, DUODENOSCOPY (EGD), COMBINED N/A 6/24/2015    Procedure: COMBINED ESOPHAGOSCOPY, GASTROSCOPY, DUODENOSCOPY (EGD), REMOVE FOREIGN BODY;  Surgeon: Mandeep Park MD;  Location: [facility]     ESOPHAGOSCOPY, GASTROSCOPY, DUODENOSCOPY (EGD), COMBINED N/A 10/25/2015    Procedure: COMBINED ESOPHAGOSCOPY, GASTROSCOPY, DUODENOSCOPY (EGD);  Surgeon: Sammy Amaro MD;  Location: [facility]     ESOPHAGOSCOPY, GASTROSCOPY, DUODENOSCOPY (EGD), COMBINED N/A 10/25/2015    Procedure: COMBINED ESOPHAGOSCOPY, GASTROSCOPY, DUODENOSCOPY (EGD), BIOPSY SINGLE OR MULTIPLE;  Surgeon: Sammy Amaro MD;  Location: [facility]     ESOPHAGOSCOPY, GASTROSCOPY, DUODENOSCOPY (EGD), DILATATION, COMBINED       EXCISE LESION TRUNK N/A 4/17/2017    Procedure: EXCISE LESION TRUNK;  Removal of Abdominal Foreign Body;  Surgeon: Nestor Phoenix MD;  [facility]     HC ESOPH/GAS REFLUX TEST W NASAL IMPED >1 HR N/A 11/19/2015     Procedure: ESOPHAGEAL IMPEDENCE FUNCTION TEST WITH 24 HOUR PH GREATER THAN 1 HOUR;  Surgeon: Thiago Apple MD;  Location: [facility]     HC UGI ENDOSCOPY DIAG W BIOPSY  08     HC UGI ENDOSCOPY DIAG W BIOPSY  12     HC UGI ENDOSCOPY W ESOPHAGEAL DILATION BALLOON <30MM  08     HC UGI ENDOSCOPY W EUS N/A 2015    Procedure: COMBINED ENDOSCOPIC ULTRASOUND, ESOPHAGOSCOPY, GASTROSCOPY, DUODENOSCOPY (EGD);  Surgeon: Wm Dueñas MD;  Location: [facility]     INJECT TRANSVERSUS ABDOMINIS PLANE (TAP) BLOCK BILATERAL Left 2016    Procedure: INJECT TRANSVERSUS ABDOMINIS PLANE (TAP) BLOCK BILATERAL;  Surgeon: Dickson Corrigan MD;  Location: [facility]     laparoscopic pineda  1995     LAPAROSCOPIC HERNIORRHAPHY INCISIONAL N/A 2018    Procedure: LAPAROSCOPIC HERNIORRHAPHY INCISIONAL;  Laparoscopic Incisional Hernia Repair with Symbotex Mesh Implant;  Surgeon: Nestor Phoenix MD;  Location: [facility]     LAPAROSCOPIC PANCREATECTOMY, TRANSPLANT AUTO ISLET CELL N/A 2016    Procedure: LAPAROSCOPIC PANCREATECTOMY, TRANSPLANT AUTO ISLET CELL;  Surgeon: Nestor Phoenix MD;  Location: [facility]     transphenoidal pituitary resection              Social History:     Social History     Occupational History     Occupation: director     Employer: [employer]   Tobacco Use     Smoking status: Former Smoker     Packs/day: 0.50     Years: 6.00     Pack years: 3.00     Types: Cigarettes     Start date: 1985     Last attempt to quit: 1992     Years since quittin.7     Smokeless tobacco: Never Used     Tobacco comment: no 2nd hand   Substance and Sexual Activity     Alcohol use: Yes     Alcohol/week: 1.8 - 3.6 oz     Types: 1 - 2 Glasses of wine, 1 - 2 Cans of beer, 1 - 2 Shots of liquor per week     Comment: occasional     Drug use: No     Sexual activity: Yes     Partners: Male     Birth control/protection: None     Comment: Hystectomy    Previous director at [employer], has been on disability since 2016. 6  years of smoking and quit in 1992. Denies any ETOH.  and lives in the [facility].          Family History:     Family History   Problem Relation Age of Onset     Eye Disorder Father         cataract, detached retina     Myocardial Infarction Father 60     Lipids Father      Cerebrovascular Disease Father      Depression Father      Substance Abuse Father      Anesthesia Reaction Father         stroke right after surgery     Cataracts Father      Osteoarthritis Father      Ulcerative Colitis Father      Lipids Mother      Hypertension Mother      Thyroid Disease Mother      Depression Mother      Angina Mother      GERD Mother      Skin Cancer Mother      Eye Disorder Son         ptosis     Eye Disorder Paternal Grandmother         cataract     Eye Disorder Paternal Grandfather         cataract     Diabetes Paternal Grandfather      Eye Disorder Maternal Grandmother         cataract     Thyroid Disease Maternal Grandmother      Coronary Artery Disease Sister         angioplasty     GERD Sister      Substance Abuse Sister      Depression Sister      Depression Son      Anxiety Disorder Son      Thyroid Disease Sister      Diabetes Maternal Grandfather      Depression Nephew      Anxiety Disorder Nephew      Thyroid Disease Nephew      Diabetes Type 2  Cousin         paternal cousin     Heart Disease Paternal Aunt      Diabetes Paternal Aunt      Diabetes Paternal Uncle      Heart Disease Paternal Uncle      Father with likely RA following with multiple rheumatologists. 3 children healthy. 1 sister with thyroid issues. Denies any Sjogren's SLE, Scleroderma.            Allergies:     Allergies   Allergen Reactions     Apple Anaphylaxis     Depakote [Divalproex Sodium] Other (See Comments)     Chest pain     Zithromax [Azithromycin Dihydrate] Anaphylaxis     Noted in 4/7/08 ER     Darvocet [Propoxyphene N-Apap] Itching     Morphine Nausea and Vomiting and Rash     Nalbuphine Hcl Rash     RASH :nubaine     Zosyn  [Piperacillin-Tazobactam In D5w] Rash     Possible allergy, did have a diffuse rash that seemed drug related but could have also been related to soap in the hospital.      Bactrim [Sulfamethoxazole W-Trimethoprim] Other (See Comments) and Nausea and Vomiting     Severely low liver function.     Cats      Compazine [Prochlorperazine] Other (See Comments)     Twitching. Takes Benedryl and is fine     Corticosteroids Other (See Comments)     All oral,IV and injectable steroids are contraindicated (unless in life threatening situations) in Islet Auto transplant recipients. They can cause irreversible loss of islet cell function. Please contact patients transplant care coordinator Mecca Watkins RN BSN @ [phone]/Pager [phone], and Endocrinologist prior to administration.     Dust Mites      Grass      Prednisone Other (See Comments)     Insomnia       Ragweeds      Tape [Adhesive Tape] Blisters     Tramadol      Trees      Zofran [Ondansetron] Other (See Comments)     migraine     Flagyl [Metronidazole] Hives and Rash            Medications:     Current Outpatient Medications   Medication Sig Dispense Refill     acetaminophen 500 MG CAPS Take 2 mg by mouth 2 times daily       amitriptyline (ELAVIL) 10 MG tablet Take 4 tablets (40 mg) by mouth At Bedtime Take 40 mg at bedtime 120 tablet 11     amitriptyline (ELAVIL) 50 MG tablet Take 1 tablet (50 mg) by mouth At Bedtime (Patient taking differently: Take 40 mg by mouth At Bedtime ) 60 tablet 3     amylase-lipase-protease (CREON 24) 67233-93781 units CPEP per EC capsule Take 3-4 capsules by mouth with meals and 1-2 with snacks. Maximum 15 capsules per day. 450 capsule 11     aspirin 81 MG EC tablet Take 1 tablet (81 mg) by mouth daily 90 tablet 3     azelastine (ASTELIN) 0.1 % spray Spray 1 spray into both nostrils 2 times daily       blood glucose (PAT CONTOUR NEXT) test strip Use to test blood sugar 6 times daily or as directed. 2 Box 11     blood glucose  monitoring (PAT MICROLET) lancets Use to test blood sugar 6-8 times daily or as directed. 100 each 11     calcium carbonate-vitamin D (CALCIUM 500/D) 500-200 MG-UNIT tablet        cetirizine (ZYRTEC) 10 MG tablet Take 10 mg by mouth daily       diphenhydrAMINE (BENADRYL ALLERGY) 25 MG capsule Take 1 capsule (25 mg) by mouth every 6 hours as needed for itching or allergies 56 capsule 0     docusate sodium (COLACE) 100 MG capsule Take 1 capsule (100 mg) by mouth 2 times daily as needed for constipation, 60 capsule 0     estradiol (VAGIFEM) 10 MCG TABS vaginal tablet        fish oil-omega-3 fatty acids 1000 MG capsule        FOLIC ACID PO Take 1 mg by mouth daily       glucose 40 % GEL gel Take 15-30 g by mouth every 15 minutes as needed for low blood sugar 3 Tube 2     ibuprofen (ADVIL/MOTRIN) 400 MG tablet Take 1 tablet (400 mg) by mouth every 6 hours as needed for moderate pain 30 tablet 0     levothyroxine (SYNTHROID) 137 MCG tablet Take 1 tablet (137 mcg) by mouth daily 90 tablet 3     lubiprostone (AMITIZA) 8 MCG capsule Take 5 capsules (40 mcg) by mouth daily Take 3 capsules in AM and 2 capsules in  capsule 3     magnesium oxide 200 MG TABS        multivitamin CF formula (CHOICEFUL) capsule Take 1 tablet by mouth daily  11     omeprazole (PRILOSEC) 40 MG capsule Take 1 capsule (40 mg) by mouth daily 90 capsule 3     order for DME Equipment being ordered:Cefaly 1 Device 0     prochlorperazine (COMPAZINE) 5 MG tablet Take two 5 mg tablets by mouth every six hours as needed for nausea. 90 tablet 3     promethazine (PHENERGAN) 25 MG tablet Take 1 tablet (25 mg) by mouth At Bedtime 60 tablet 3     ranitidine (ZANTAC) 150 MG tablet Take 2 tablets (300 mg) by mouth daily 180 tablet 3     ranitidine (ZANTAC) 150 MG tablet Take 1 tablet (150 mg) by mouth At Bedtime 90 tablet 3     senna-docusate (SENOKOT-S;PERICOLACE) 8.6-50 MG per tablet Take 1-2 tablets by mouth 2 times daily 100 tablet 0     Sharps Container  (BD SHARPS ) MISC 1 Container as needed 1 each 1     ZOLMitriptan (ZOMIG) 5 MG tablet Take 1 tablet (5 mg) by mouth at onset of headache for migraine 9 tablet 3            Physical Exam:   Blood pressure 111/76, pulse 89, temperature 98.2  F (36.8  C), temperature source Oral, weight 71.8 kg (158 lb 6.4 oz), SpO2 97 %, not currently breastfeeding.  Wt Readings from Last 4 Encounters:   09/17/19 70.9 kg (156 lb 3.2 oz)   09/13/19 71.8 kg (158 lb 6.4 oz)   07/02/19 71.2 kg (156 lb 14.4 oz)   04/05/19 70.8 kg (156 lb)     Ideal body weight: 62.5 kg (137 lb 13.3 oz)  Adjusted ideal body weight: 66 kg (145 lb 7.3 oz)    Constitutional: well-developed, appearing stated age; cooperative, pleasant  Eyes: nl EOM, conjunctiva, sclera. No icterus.   ENT: nl external ears, nose, lips, teeth, gums, throat  No mucous membrane lesions, poor saliva pool, no parotid enlargement  Neck: no mass or thyroid enlargement  Resp: lungs clear to auscultation,  CV: RRR, no murmurs, rubs or gallops, no edema  GI: NT, soft  Lymph: no cervical, supraclavicular nodes  MS: no active synovitis or joint tenderness  Skin: no nail pitting, alopecia, rash, nodules or lesions  Neuro: nl cranial nerves, strength in both proximal and distal UE and LE.   Psych: nl affect.         Data:     Results for orders placed or performed in visit on 06/25/19   Immunoglobulin G subclasses   Result Value Ref Range     695 - 1,620 mg/dL    IgG1 417 300 - 856 mg/dL    IgG2 364 158 - 761 mg/dL    IgG3 67 24 - 192 mg/dL    IgG4 89 (H) 11 - 86 mg/dL   Hepatic panel   Result Value Ref Range    Bilirubin Direct 0.2 0.0 - 0.2 mg/dL    Bilirubin Total 0.7 0.2 - 1.3 mg/dL    Albumin 4.0 3.4 - 5.0 g/dL    Protein Total 7.4 6.8 - 8.8 g/dL    Alkaline Phosphatase 123 40 - 150 U/L    ALT 65 (H) 0 - 50 U/L    AST 56 (H) 0 - 45 U/L     *Note: Due to a large number of results and/or encounters for the requested time period, some results have not been displayed. A  complete set of results can be found in Results Review.       Recent Labs   Lab Test  04/25/17   1355  04/04/17   0757  02/21/17   1315   12/29/16   0620   06/06/15   1903   10/23/12   1451   WBC  4.4  5.0  4.7   < >  10.7   < >   --    < >   --    RBC  4.38  4.04  4.28   < >  3.13*   < >   --    < >   --    HGB  11.1*  10.4*  11.6*   < >  9.1*   < >   --    < >   --    HCT  36.1  33.4*  37.1   < >  27.7*   < >   --    < >   --    MCV  82  83  87   < >  89   < >   --    < >   --    RDW  17.5*  15.8*  13.0   < >  13.2   < >   --    < >   --    PLT  543*  482*  733*   < >  107*   < >   --    < >   --    ALBUMIN  3.7  3.2*  3.7   < >  2.9*   < >  2.8*   < >  4.4   CRP   --    --    --    --   90.0*   --   71.0*   --   7.2   BUN  16  17  13   < >  7   < >  2*   < >   --     < > = values in this interval not displayed.      Recent Labs   Lab Test  12/20/16   1121  10/23/15   1554  10/09/13   0721   06/25/09   1423   TSH  0.13*  0.57  1.86   < >  0.37*   T4  1.17   --    --    --   1.17    < > = values in this interval not displayed.     Hemoglobin   Date Value Ref Range Status   04/05/2019 14.0 11.7 - 15.7 g/dL Final   01/24/2019 12.4 11.7 - 15.7 g/dL Final   12/17/2018 13.1 11.7 - 15.7 g/dL Final     Urea Nitrogen   Date Value Ref Range Status   04/05/2019 17 7 - 30 mg/dL Final   01/24/2019 17 7 - 30 mg/dL Final   12/17/2018 9 7 - 30 mg/dL Final     Creatinine   Date Value Ref Range Status   05/20/2017 0.6 0.52 - 1.04 mg/dL Final     Sed Rate   Date Value Ref Range Status   10/23/2012 11 0 - 20 mm/h Final   07/27/2006 9 0 - 20 mm/h Final     CRP Inflammation   Date Value Ref Range Status   12/29/2016 90.0 (H) 0.0 - 8.0 mg/L Final   06/06/2015 71.0 (H) 0.0 - 8.0 mg/L Final   10/23/2012 7.2 0.0 - 8.0 mg/L Final     AST   Date Value Ref Range Status   06/25/2019 56 (H) 0 - 45 U/L Final   04/05/2019 46 (H) 0 - 45 U/L Final   01/24/2019 31 0 - 45 U/L Final     Albumin   Date Value Ref Range Status   06/25/2019 4.0 3.4 - 5.0  g/dL Final   04/05/2019 3.9 3.4 - 5.0 g/dL Final   01/24/2019 3.4 3.4 - 5.0 g/dL Final     Alkaline Phosphatase   Date Value Ref Range Status   06/25/2019 123 40 - 150 U/L Final   04/05/2019 121 40 - 150 U/L Final   01/24/2019 110 40 - 150 U/L Final     ALT   Date Value Ref Range Status   06/25/2019 65 (H) 0 - 50 U/L Final   04/05/2019 59 (H) 0 - 50 U/L Final   01/24/2019 48 0 - 50 U/L Final     Rheumatoid Factor   Date Value Ref Range Status   04/10/2017 <20 <20 IU/mL Final     Recent Labs   Lab Test 06/25/19  1420 04/05/19  1316 02/19/19  1651 01/24/19  0740 12/17/18  1849  03/23/18  0958  12/20/16  1121   WBC  --  6.2  --  5.5 5.8   < > 4.1   < >  --    HGB  --  14.0  --  12.4 13.1   < > 13.9   < >  --    HCT  --  42.4  --  39.0 40.6   < > 41.7   < >  --    MCV  --  94  --  96 94   < > 94   < >  --    PLT  --  377  --  399 377   < > 382   < >  --    BUN  --  17  --  17 9   < > 12   < >  --    TSH  --   --  1.83  --   --   --  0.74  --  0.13*   AST 56* 46*  --  31 42   < > 44   < >  --    ALT 65* 59*  --  48 60*   < > 64*   < >  --    ALKPHOS 123 121  --  110 142   < > 160*   < >  --     < > = values in this interval not displayed.     AMARI undetected  SSA, SSB, cryoglobulin all negative  Normal C3, C4  RF undetected  IGG subclasses with mild IGG4 elevation to 146H -> 89 -> 70s  SPEP with normal pattern and no monoclonal protein seen.    Recent LFT normal with declining Alk Phos. Lowest in past year  IgG4 normalizing    LFTs, CBC, IgG subclasses normal 1/2018.    Other studies:   Tissue biopsy 12/28/2016:  FINAL DIAGNOSIS:   A. Spleen, splenectomy:   - Splenic tissue with no significant histologic abnormality     B. Duodenum and pancreas, resection:   - Chronic pancreatitis   - Duodenum with no significant histologic abnormality     C. Pancreas, uncinate process, biopsy:   - Chronic pancreatitis     D. Liver, dome lesion, needle core biopsy:   - Liver with spindle cell proliferation/lesion, acute and chronic    inflammation and fibrosis   - Focally increased IgG4 positive plasma cells (upto 60/HPF)   - See comment     E. Pancreas, head, biopsy:   - Chronic pancreatitis     F. Pancreas, tail, biopsy:   - Chronic pancreatitis     G. Pancreas, body, biopsy:   - Chronic pancreatitis with fat necrosis     COMMENT:   Sections of the liver lesion show stromal proliferation composed of   bland spindle cell bundle and whorls with associated fibrosis   infiltrated by neutrophils, lymphocytes, plasma cells and macrophages.   Occasional lymphoid aggregates are noted. The surrounding liver   parenchymal tissue show diffuse moderate portal inflammation composed of   lymphocytes and plasma cells.  There is also a mild to moderate   infiltrate of neutrophils.  Reticulin stain shows preserved reticulin   framework.  Trichrome stains highlight the spindle cell proliferation   and show mild portal fibrosis.  Immunostain are performed with   appropriate controls. The spindle cell proliferation is negative for ALK   and show patchy infiltrate of IgG4 positive plasma cells (upto 60/HPF).   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 Possibility of IgG4-related disease cannot be ruled out.   Clinical and radiologic correlation is recommended.     MRI Abdomen:4/22/2017  IMPRESSION:  1. 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  2. No significant change in small wedge-shaped peripheral areas of  persistent enhancement surrounding mildly dilated biliary radicles,  findings most compatible with cholangitis or sequela of previous  infection.  3. Nonspecific subcutaneous fluid collection in the left upper  quadrant amidst the abdominal wall postsurgical changes.    Reviewed Rheumatology lab flowsheet    MRCP 7/13/2017  IMPRESSION:  1. Findings consistent with hemosiderin deposition within the liver.  2. There is improvement in the heterogeneous enhancement seen  on  comparison exam, though patchy areas of increased T2 signal and  persistent enhancement are seen in the liver suggestive of regions of  fibrosis. Mildly dilated bile ducts in the regions of the liver which  appears somewhat fibrotic, overall similar to prior exam.  3. Postsurgical changes of pancreatectomy, splenectomy and  cholecystectomy.    Lip Biopsy 5/31/2017:  FINAL DIAGNOSIS:   Lip biopsy, lower:   -  Benign minor salivary glands with normal lobular architecture, see   comment     COMMENT:   There is focal mild chronic inflammation.  There is no morphologic   evidence of IgG-4 related disease or Sjogren's disease.     Component      Latest Ref Rng & Units 7/31/2018 8/23/2018 8/23/2018 8/23/2018            5:30 AM 10:32 AM 10:35 AM   WBC      4.0 - 11.0 10e9/L 5.6      RBC Count      3.8 - 5.2 10e12/L 4.43      Hemoglobin      11.7 - 15.7 g/dL 13.7  13.0    Hematocrit      35.0 - 47.0 % 41.7  39.7    MCV      78 - 100 fl 94      MCH      26.5 - 33.0 pg 30.9      MCHC      31.5 - 36.5 g/dL 32.9      RDW      10.0 - 15.0 % 13.9      Platelet Count      150 - 450 10e9/L 377      Diff Method             % Neutrophils      %       % Lymphocytes      %       % Monocytes      %       % Eosinophils      %       % Basophils      %       % Immature Granulocytes      %       Nucleated RBCs      0 /100       Absolute Neutrophil      1.6 - 8.3 10e9/L       Absolute Lymphocytes      0.8 - 5.3 10e9/L       Absolute Monocytes      0.0 - 1.3 10e9/L       Absolute Eosinophils      0.0 - 0.7 10e9/L       Absolute Basophils      0.0 - 0.2 10e9/L       Abs Immature Granulocytes      0 - 0.4 10e9/L       Absolute Nucleated RBC             Sodium      133 - 144 mmol/L 138      Potassium      3.4 - 5.3 mmol/L 3.9      Chloride      94 - 109 mmol/L 105      Carbon Dioxide      20 - 32 mmol/L 28      Anion Gap      3 - 14 mmol/L 5      Glucose      70 - 99 mg/dL 91 87  94   Urea Nitrogen      7 - 30 mg/dL 14      Creatinine       0.52 - 1.04 mg/dL 0.68      GFR Estimate      >60 mL/min/1.7m2 >90      GFR Estimate If Black      >60 mL/min/1.7m2 >90      Calcium      8.5 - 10.1 mg/dL 9.0      Bilirubin Total      0.2 - 1.3 mg/dL 0.7      Albumin      3.4 - 5.0 g/dL 3.7      Protein Total      6.8 - 8.8 g/dL 7.6      Alkaline Phosphatase      40 - 150 U/L 170 (H)      ALT      0 - 50 U/L 77 (H)      AST      0 - 45 U/L 69 (H)      Color Urine       Straw      Appearance Urine       Clear      Glucose Urine      NEG:Negative mg/dL Negative      Bilirubin Urine      NEG:Negative Negative      Ketones Urine      NEG:Negative mg/dL Negative      Specific Gravity Urine      1.003 - 1.035 1.003      Blood Urine      NEG:Negative Negative      pH Urine      5.0 - 7.0 pH 7.0      Protein Albumin Urine      NEG:Negative mg/dL Negative      Urobilinogen mg/dL      0.0 - 2.0 mg/dL 0.0      Nitrite Urine      NEG:Negative Negative      Leukocyte Esterase Urine      NEG:Negative Negative      Source       Midstream Urine      WBC Urine      0 - 5 /HPF <1      RBC Urine      0 - 2 /HPF <1      Bacteria Urine      NEG:Negative /HPF Few (A)      Mucous Urine      NEG:Negative /LPF Present (A)      Bilirubin Direct      0.0 - 0.2 mg/dL       IGG      695 - 1620 mg/dL       IgG1      300 - 856 mg/dL       IgG2      158 - 761 mg/dL       IgG3      24 - 192 mg/dL       IgG4      11 - 86 mg/dL         Component      Latest Ref Rng & Units 8/23/2018 8/23/2018 8/24/2018          12:38 PM  2:46 PM    WBC      4.0 - 11.0 10e9/L      RBC Count      3.8 - 5.2 10e12/L      Hemoglobin      11.7 - 15.7 g/dL 13.0  13.5   Hematocrit      35.0 - 47.0 % 39.6  41.7   MCV      78 - 100 fl      MCH      26.5 - 33.0 pg      MCHC      31.5 - 36.5 g/dL      RDW      10.0 - 15.0 %      Platelet Count      150 - 450 10e9/L      Diff Method            % Neutrophils      %      % Lymphocytes      %      % Monocytes      %      % Eosinophils      %      % Basophils      %      % Immature  Granulocytes      %      Nucleated RBCs      0 /100      Absolute Neutrophil      1.6 - 8.3 10e9/L      Absolute Lymphocytes      0.8 - 5.3 10e9/L      Absolute Monocytes      0.0 - 1.3 10e9/L      Absolute Eosinophils      0.0 - 0.7 10e9/L      Absolute Basophils      0.0 - 0.2 10e9/L      Abs Immature Granulocytes      0 - 0.4 10e9/L      Absolute Nucleated RBC            Sodium      133 - 144 mmol/L      Potassium      3.4 - 5.3 mmol/L      Chloride      94 - 109 mmol/L      Carbon Dioxide      20 - 32 mmol/L      Anion Gap      3 - 14 mmol/L      Glucose      70 - 99 mg/dL  104 (H) 85   Urea Nitrogen      7 - 30 mg/dL      Creatinine      0.52 - 1.04 mg/dL      GFR Estimate      >60 mL/min/1.7m2      GFR Estimate If Black      >60 mL/min/1.7m2      Calcium      8.5 - 10.1 mg/dL      Bilirubin Total      0.2 - 1.3 mg/dL      Albumin      3.4 - 5.0 g/dL      Protein Total      6.8 - 8.8 g/dL      Alkaline Phosphatase      40 - 150 U/L      ALT      0 - 50 U/L      AST      0 - 45 U/L      Color Urine            Appearance Urine            Glucose Urine      NEG:Negative mg/dL      Bilirubin Urine      NEG:Negative      Ketones Urine      NEG:Negative mg/dL      Specific Gravity Urine      1.003 - 1.035      Blood Urine      NEG:Negative      pH Urine      5.0 - 7.0 pH      Protein Albumin Urine      NEG:Negative mg/dL      Urobilinogen mg/dL      0.0 - 2.0 mg/dL      Nitrite Urine      NEG:Negative      Leukocyte Esterase Urine      NEG:Negative      Source            WBC Urine      0 - 5 /HPF      RBC Urine      0 - 2 /HPF      Bacteria Urine      NEG:Negative /HPF      Mucous Urine      NEG:Negative /LPF      Bilirubin Direct      0.0 - 0.2 mg/dL      IGG      695 - 1620 mg/dL      IgG1      300 - 856 mg/dL      IgG2      158 - 761 mg/dL      IgG3      24 - 192 mg/dL      IgG4      11 - 86 mg/dL        Component      Latest Ref Rng & Units 9/13/2018             WBC      4.0 - 11.0 10e9/L 5.8   RBC Count      3.8  - 5.2 10e12/L 4.51   Hemoglobin      11.7 - 15.7 g/dL 14.1   Hematocrit      35.0 - 47.0 % 42.1   MCV      78 - 100 fl 93   MCH      26.5 - 33.0 pg 31.3   MCHC      31.5 - 36.5 g/dL 33.5   RDW      10.0 - 15.0 % 13.2   Platelet Count      150 - 450 10e9/L 430   Diff Method       Automated Method   % Neutrophils      % 37.1   % Lymphocytes      % 40.5   % Monocytes      % 10.5   % Eosinophils      % 9.8   % Basophils      % 2.1   % Immature Granulocytes      % 0.0   Nucleated RBCs      0 /100 0   Absolute Neutrophil      1.6 - 8.3 10e9/L 2.2   Absolute Lymphocytes      0.8 - 5.3 10e9/L 2.4   Absolute Monocytes      0.0 - 1.3 10e9/L 0.6   Absolute Eosinophils      0.0 - 0.7 10e9/L 0.6   Absolute Basophils      0.0 - 0.2 10e9/L 0.1   Abs Immature Granulocytes      0 - 0.4 10e9/L 0.0   Absolute Nucleated RBC       0.0   Sodium      133 - 144 mmol/L 138   Potassium      3.4 - 5.3 mmol/L 4.3   Chloride      94 - 109 mmol/L 102   Carbon Dioxide      20 - 32 mmol/L 30   Anion Gap      3 - 14 mmol/L 5   Glucose      70 - 99 mg/dL 119 (H)   Urea Nitrogen      7 - 30 mg/dL 12   Creatinine      0.52 - 1.04 mg/dL 0.83   GFR Estimate      >60 mL/min/1.7m2 72   GFR Estimate If Black      >60 mL/min/1.7m2 87   Calcium      8.5 - 10.1 mg/dL 9.2   Bilirubin Total      0.2 - 1.3 mg/dL 0.8   Albumin      3.4 - 5.0 g/dL 3.9   Protein Total      6.8 - 8.8 g/dL 7.8   Alkaline Phosphatase      40 - 150 U/L 145   ALT      0 - 50 U/L 59 (H)   AST      0 - 45 U/L 39   Color Urine          Appearance Urine          Glucose Urine      NEG:Negative mg/dL    Bilirubin Urine      NEG:Negative    Ketones Urine      NEG:Negative mg/dL    Specific Gravity Urine      1.003 - 1.035    Blood Urine      NEG:Negative    pH Urine      5.0 - 7.0 pH    Protein Albumin Urine      NEG:Negative mg/dL    Urobilinogen mg/dL      0.0 - 2.0 mg/dL    Nitrite Urine      NEG:Negative    Leukocyte Esterase Urine      NEG:Negative    Source          WBC Urine      0 - 5  /HPF    RBC Urine      0 - 2 /HPF    Bacteria Urine      NEG:Negative /HPF    Mucous Urine      NEG:Negative /LPF    Bilirubin Direct      0.0 - 0.2 mg/dL 0.2   IGG      695 - 1620 mg/dL 1160   IgG1      300 - 856 mg/dL 416   IgG2      158 - 761 mg/dL 384   IgG3      24 - 192 mg/dL 83   IgG4      11 - 86 mg/dL 95 (H)     Component      Latest Ref Rng & Units 6/25/2019 9/13/2019   WBC      4.0 - 11.0 10e9/L  5.2   RBC Count      3.8 - 5.2 10e12/L  4.23   Hemoglobin      11.7 - 15.7 g/dL  13.7   Hematocrit      35.0 - 47.0 %  40.9   MCV      78 - 100 fl  97   MCH      26.5 - 33.0 pg  32.4   MCHC      31.5 - 36.5 g/dL  33.5   RDW      10.0 - 15.0 %  13.5   Platelet Count      150 - 450 10e9/L  336   Diff Method        Automated Method   % Neutrophils      %  39.3   % Lymphocytes      %  40.6   % Monocytes      %  12.8   % Eosinophils      %  5.2   % Basophils      %  2.1   % Immature Granulocytes      %  0.0   Nucleated RBCs      0 /100  0   Absolute Neutrophil      1.6 - 8.3 10e9/L  2.0   Absolute Lymphocytes      0.8 - 5.3 10e9/L  2.1   Absolute Monocytes      0.0 - 1.3 10e9/L  0.7   Absolute Eosinophils      0.0 - 0.7 10e9/L  0.3   Absolute Basophils      0.0 - 0.2 10e9/L  0.1   Abs Immature Granulocytes      0 - 0.4 10e9/L  0.0   Absolute Nucleated RBC        0.0   Sodium      133 - 144 mmol/L  138   Potassium      3.4 - 5.3 mmol/L  4.1   Chloride      94 - 109 mmol/L  104   Carbon Dioxide      20 - 32 mmol/L  29   Anion Gap      3 - 14 mmol/L  4   Glucose      70 - 99 mg/dL  93   Urea Nitrogen      7 - 30 mg/dL  14   Creatinine      0.52 - 1.04 mg/dL  0.76   GFR Estimate      >60 mL/min/1.73:m2  89   GFR Estimate If Black      >60 mL/min/1.73:m2  >90   Calcium      8.5 - 10.1 mg/dL  9.1   Bilirubin Total      0.2 - 1.3 mg/dL 0.7 0.8   Albumin      3.4 - 5.0 g/dL 4.0 4.2   Protein Total      6.8 - 8.8 g/dL 7.4 7.4   Alkaline Phosphatase      40 - 150 U/L 123 113   ALT      0 - 50 U/L 65 (H) 74 (H)   AST      0 - 45  U/L 56 (H) 45   Bilirubin Direct      0.0 - 0.2 mg/dL 0.2    IGG      695 - 1,620 mg/dL 963 976   IgG1      382 - 929 mg/dL 417 402   IgG2      242 - 700 mg/dL 364 337   IgG3      22 - 176 mg/dL 67 68   IgG4      4 - 86 mg/dL 89 (H) 87 (H)     Component      Latest Ref Rng & Units 9/17/2019   Bilirubin Direct      0.0 - 0.2 mg/dL 0.1   Bilirubin Total      0.2 - 1.3 mg/dL 0.5   Albumin      3.4 - 5.0 g/dL 4.0   Protein Total      6.8 - 8.8 g/dL 7.4   Alkaline Phosphatase      40 - 150 U/L 113   ALT      0 - 50 U/L 68 (H)   AST      0 - 45 U/L 32   IGG      695 - 1,620 mg/dL 953   IgG1      382 - 929 mg/dL 390   IgG2      242 - 700 mg/dL 349   IgG3      22 - 176 mg/dL 64   IgG4      4 - 86 mg/dL 86

## 2019-09-17 ENCOUNTER — ANCILLARY PROCEDURE (OUTPATIENT)
Dept: GENERAL RADIOLOGY | Facility: CLINIC | Age: 54
End: 2019-09-17
Attending: NURSE PRACTITIONER
Payer: COMMERCIAL

## 2019-09-17 ENCOUNTER — OFFICE VISIT (OUTPATIENT)
Dept: INTERNAL MEDICINE | Facility: CLINIC | Age: 54
End: 2019-09-17
Payer: COMMERCIAL

## 2019-09-17 VITALS
BODY MASS INDEX: 23.67 KG/M2 | DIASTOLIC BLOOD PRESSURE: 72 MMHG | SYSTOLIC BLOOD PRESSURE: 110 MMHG | OXYGEN SATURATION: 95 % | HEIGHT: 68 IN | HEART RATE: 87 BPM | WEIGHT: 156.2 LBS | TEMPERATURE: 98 F | RESPIRATION RATE: 16 BRPM

## 2019-09-17 DIAGNOSIS — E03.9 HYPOTHYROIDISM, UNSPECIFIED TYPE: ICD-10-CM

## 2019-09-17 DIAGNOSIS — J30.2 SEASONAL ALLERGIES: ICD-10-CM

## 2019-09-17 DIAGNOSIS — Z23 NEED FOR INFLUENZA VACCINATION: Primary | ICD-10-CM

## 2019-09-17 DIAGNOSIS — D89.84 IGG4-RELATED SCLEROSING DISEASE (H): ICD-10-CM

## 2019-09-17 DIAGNOSIS — N95.2 ATROPHIC VAGINITIS: ICD-10-CM

## 2019-09-17 DIAGNOSIS — M72.2 PLANTAR FASCIITIS: ICD-10-CM

## 2019-09-17 DIAGNOSIS — G89.4 CHRONIC PAIN SYNDROME: ICD-10-CM

## 2019-09-17 DIAGNOSIS — Z86.2 HX OF IRON DEFICIENCY ANEMIA: ICD-10-CM

## 2019-09-17 DIAGNOSIS — R73.09 ELEVATED GLUCOSE: ICD-10-CM

## 2019-09-17 DIAGNOSIS — K21.9 GASTROESOPHAGEAL REFLUX DISEASE WITHOUT ESOPHAGITIS: ICD-10-CM

## 2019-09-17 DIAGNOSIS — D50.9 IRON DEFICIENCY ANEMIA: Primary | ICD-10-CM

## 2019-09-17 DIAGNOSIS — R51.9 HEADACHE DISORDER: ICD-10-CM

## 2019-09-17 LAB
ALBUMIN SERPL-MCNC: 4 G/DL (ref 3.4–5)
ALP SERPL-CCNC: 113 U/L (ref 40–150)
ALT SERPL W P-5'-P-CCNC: 68 U/L (ref 0–50)
AST SERPL W P-5'-P-CCNC: 32 U/L (ref 0–45)
BILIRUB DIRECT SERPL-MCNC: 0.1 MG/DL (ref 0–0.2)
BILIRUB SERPL-MCNC: 0.5 MG/DL (ref 0.2–1.3)
CREAT UR-MCNC: 66 MG/DL
HBA1C MFR BLD: 5.8 % (ref 0–5.6)
IGG SERPL-MCNC: 976 MG/DL (ref 695–1620)
IGG1 SER-MCNC: 402 MG/DL (ref 382–929)
IGG2 SER-MCNC: 337 MG/DL (ref 242–700)
IGG3 SER-MCNC: 68 MG/DL (ref 22–176)
IGG4 SER-MCNC: 87 MG/DL (ref 4–86)
IRON SATN MFR SERPL: 28 % (ref 15–46)
IRON SERPL-MCNC: 65 UG/DL (ref 35–180)
MICROALBUMIN UR-MCNC: 6 MG/L
MICROALBUMIN/CREAT UR: 9.43 MG/G CR (ref 0–25)
PROT SERPL-MCNC: 7.4 G/DL (ref 6.8–8.8)
TIBC SERPL-MCNC: 228 UG/DL (ref 240–430)

## 2019-09-17 RX ORDER — AMITRIPTYLINE HYDROCHLORIDE 10 MG/1
40 TABLET ORAL AT BEDTIME
Qty: 120 TABLET | Refills: 11 | Status: SHIPPED | OUTPATIENT
Start: 2019-09-17 | End: 2020-08-26

## 2019-09-17 RX ORDER — LEVOTHYROXINE SODIUM 137 UG/1
137 TABLET ORAL DAILY
Qty: 90 TABLET | Refills: 3 | Status: SHIPPED | OUTPATIENT
Start: 2019-09-17 | End: 2020-08-26

## 2019-09-17 RX ORDER — OMEPRAZOLE 40 MG/1
40 CAPSULE, DELAYED RELEASE ORAL AT BEDTIME
Qty: 90 CAPSULE | Refills: 3 | Status: SHIPPED | OUTPATIENT
Start: 2019-09-17 | End: 2020-08-05

## 2019-09-17 RX ORDER — MONTELUKAST SODIUM 10 MG/1
10 TABLET ORAL AT BEDTIME
Qty: 30 TABLET | Refills: 3 | Status: SHIPPED | OUTPATIENT
Start: 2019-09-17 | End: 2019-12-13

## 2019-09-17 RX ORDER — OMEPRAZOLE 40 MG/1
40 CAPSULE, DELAYED RELEASE ORAL DAILY
Qty: 90 CAPSULE | Refills: 3 | Status: SHIPPED | OUTPATIENT
Start: 2019-09-17 | End: 2020-09-04

## 2019-09-17 RX ORDER — ZOLMITRIPTAN 5 MG/1
5 TABLET, FILM COATED ORAL
Qty: 9 TABLET | Refills: 3 | Status: SHIPPED | OUTPATIENT
Start: 2019-09-17 | End: 2020-11-29

## 2019-09-17 RX ORDER — ESTRADIOL 10 UG/1
10 INSERT VAGINAL
Qty: 24 TABLET | Refills: 3 | Status: SHIPPED | OUTPATIENT
Start: 2019-09-19 | End: 2020-08-26

## 2019-09-17 RX ORDER — AZELASTINE 1 MG/ML
1 SPRAY, METERED NASAL 2 TIMES DAILY
Qty: 1 BOTTLE | Refills: 11 | Status: ON HOLD | OUTPATIENT
Start: 2019-09-17 | End: 2023-01-08

## 2019-09-17 ASSESSMENT — PAIN SCALES - GENERAL: PAINLEVEL: MODERATE PAIN (5)

## 2019-09-17 ASSESSMENT — MIFFLIN-ST. JEOR: SCORE: 1357.53

## 2019-09-17 NOTE — LETTER
Patient:  Dinora Mcghee  :   1965  MRN:     [id number]        Ms.Elaine Racquel Mcghee  [address] [facility] [address]        2019    Dear ,    We are writing to inform you of your test results. IgG-4 is now normal. Liver tests are improved. Good news! Dr. Lugo said you don't  need to see her as I continue to monitor your labs.      Results for orders placed or performed in visit on 19   Hemoglobin A1c   Result Value Ref Range    Hemoglobin A1C 5.8 (H) 0 - 5.6 %   Immunoglobulin G subclasses   Result Value Ref Range     695 - 1,620 mg/dL    IgG1 390 382 - 929 mg/dL    IgG2 349 242 - 700 mg/dL    IgG3 64 22 - 176 mg/dL    IgG4 86 4 - 86 mg/dL   Hepatic panel   Result Value Ref Range    Bilirubin Direct 0.1 0.0 - 0.2 mg/dL    Bilirubin Total 0.5 0.2 - 1.3 mg/dL    Albumin 4.0 3.4 - 5.0 g/dL    Protein Total 7.4 6.8 - 8.8 g/dL    Alkaline Phosphatase 113 40 - 150 U/L    ALT 68 (H) 0 - 50 U/L    AST 32 0 - 45 U/L   Iron and iron binding capacity   Result Value Ref Range    Iron 65 35 - 180 ug/dL    Iron Binding Cap 228 (L) 240 - 430 ug/dL    Iron Saturation Index 28 15 - 46 %     *Note: Due to a large number of results and/or encounters for the requested time period, some results have not been displayed. A complete set of results can be found in Results Review.       Anita Becerra MD

## 2019-09-17 NOTE — NURSING NOTE
Dinora Racquellibby Mcghee      1.  Has the patient received the information for the influenza vaccine? YES    2.  Does the patient have any of the following contraindications?     Allergy to eggs? No     Allergy to a component of the influenza vaccine? No     Serious reaction to previous influenza vaccines? No     Paralyzed by Guillain-[place] Lion syndrome? No     Currently sick today?  No         3.  Verified patient name and  prior to injection. Yes  4.  The patient was instructed to wait 15 minutes before leaving the building in the event of an allergic reaction: YES        Vaccination given by David Howard, EMT  .      Recorded by David Howard, EMT      Dinora Mcghee comes into clinic today at the request of Beverly, Ordering Provider for an immunization/s.    I gave the Flu immunization/s while the patient was in clinic. They received an informational sheet and I explained the common side effects of the injection. The patient tolerated the procedure well and had no complications while here in clinic.     This service provided today was under the supervising provider of the day Beverly , who was available if needed.    David Howard CMA, EMT at 3:33 PM on 2019.

## 2019-09-17 NOTE — PATIENT INSTRUCTIONS
[facility] Medication Refill Request Information:  * Please contact your pharmacy regarding ANY request for medication refills.  ** [facility] Prescription Fax = [phone]  * Please allow 3 business days for routine medication refills.  * Please allow 5 business days for controlled substance medication refills.     [facility] Test Result notification information:  *You will be notified with in 7-10 days of your appointment day regarding the results of your test.  If you are on MyChart you will be notified as soon as the provider has reviewed the results and signed off on them.

## 2019-09-17 NOTE — PROGRESS NOTES
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 She would like nasal spray for allergies as she has been waking up with migraines every day for the last few weeks and she attributes this to allergies. She also takes Zyrtec 10 mg for her allergies. She takes Zantac 300 mg each morning and Prilosec 40 mg at night for her gastroesophageal reflux disease (GERD). She takes compazine and phenergen every day as she always has nausea due to gastroparesis. She has not had any high blood sugars in a long time. She had a mammogram on 9/13 which was normal. She is up to date on eye and dental exams.     Plantar fasciitis:  She has been having excruciating left foot pain, seemingly plantar fasciitis, for over a year now. She has tried Advil, wearing a boot, PT, massage, dry needle therapy, ice, shoe inserts, and does lots of different prescribed stretches but nothing helps much. She has started swimming again as walking exacerbates it every day. Wearing shoes helps but ist still hurts, especially in bed and when she first steps out of bed. She has taken gabapentin and lyrica in the past and has not tolerated either.    Other concerns discussed:  1. She had recent autoimmune labs ordered by rheumatology. She would like her iron checked as she is feeling fatigued. She is due to have her A1c checked.  2. Her TSH was last checked in February, and it was 1.83.     Review of Systems:   Pertinent items are noted in HPI or as in patient entered ROS below, remainder of complete ROS is negative.     Current Outpatient Medications   Medication     amitriptyline (ELAVIL) 10 MG tablet     azelastine (ASTELIN)  0.1 % nasal spray     estradiol (VAGIFEM) 10 MCG TABS vaginal tablet     levothyroxine (SYNTHROID) 137 MCG tablet     montelukast (SINGULAIR) 10 MG tablet     omeprazole (PRILOSEC) 40 MG DR capsule     omeprazole (PRILOSEC) 40 MG DR capsule     ZOLMitriptan (ZOMIG) 5 MG tablet     acetaminophen 500 MG CAPS     amylase-lipase-protease (CREON 24) 55825-83068 units CPEP per EC capsule     aspirin 81 MG EC tablet     blood glucose (PAT CONTOUR NEXT) test strip     blood glucose monitoring (PAT MICROLET) lancets     calcium carbonate-vitamin D (CALCIUM 500/D) 500-200 MG-UNIT tablet     cetirizine (ZYRTEC) 10 MG tablet     diphenhydrAMINE (BENADRYL ALLERGY) 25 MG capsule     docusate sodium (COLACE) 100 MG capsule     fish oil-omega-3 fatty acids 1000 MG capsule     FOLIC ACID PO     glucose 40 % GEL gel     ibuprofen (ADVIL/MOTRIN) 400 MG tablet     lubiprostone (AMITIZA) 8 MCG capsule     magnesium oxide 200 MG TABS     multivitamin CF formula (CHOICEFUL) capsule     order for DME     prochlorperazine (COMPAZINE) 5 MG tablet     promethazine (PHENERGAN) 25 MG tablet     ranitidine (ZANTAC) 150 MG tablet     senna-docusate (SENOKOT-S;PERICOLACE) 8.6-50 MG per tablet     Sharps Container (BD SHARPS ) MISC       Allergies:   Apple; Depakote [divalproex sodium]; Zithromax [azithromycin dihydrate]; Darvocet [propoxyphene n-apap]; Morphine; Nalbuphine hcl; Zosyn [piperacillin-tazobactam in d5w]; Bactrim [sulfamethoxazole w-trimethoprim]; Cats; Compazine [prochlorperazine]; Corticosteroids; Dust mites; Grass; Prednisone; Ragweeds; Tape [adhesive tape]; Tramadol; Trees; Zofran [ondansetron]; and Flagyl [metronidazole]      Past Medical History:  Allergic rhinitis  Arthritis  Choledocholithiasis  Chronic constipation  Chronic nausea  Chronic pancreatitis  Degeneration of lumbar or lumbosacral intervertebral disc  Gastroparesis  Hiatal hernia  Pituitary adenoma s/p  resection  Hypothyroidism  Migraines  Hyperlipidemia  Hearing loss  Chondromalacia of patella  Vertiginous syndrome and labyrinthine disorder  Lumbago  Gastroesophageal reflux disease (GERD)   Post-pancreatectomy diabetes  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  Incisional hernia  Adhesive capsulitis of shoulder  Chronic pain syndrome  Iron deficiency anemia     Past Surgical History:  Abdomen surgery - (C-sections 1993, 1996, 1998), (endometriosis growth 1999)  Appendectomy  Excise pituitary transnasal/septal - 1980  Total abdominal hysterectomy with bilateral Salpingo-oophorectomy - 1999  Wrist arthroscopy, release transverse ligament, bilateral - 12/17/2008  Colonoscopy - 2014  Endoscopic ultrasound upper GI tract (5) - 10/03/2016  EGD (x4)  Excise lesion trunk - 04/17/2017  Esophageal gas reflux test with nasal impedance - 11/19/2015  Inject transversus abdominis plane (tap) block bilateral - 09/22/2016  Laparoscopic cholecystectomy - 1995  Laparoscopic herniorrhaphy incisional - 08/23/2018  Laparoscopic pancreatectomy, transplant auto islet cell - 12/28/2016  Transphenoidal pituitary resection - 1990    Family History:   Eye disorder - father (cataract, detached retina), son (ptosis), paternal grandmother (cataract), paternal grandfather (cataract), maternal grandmother (cataract)  Myocardial infarction - father (age 60)  Lipids - father, mother  Cerebrovascular disease - father  Depression - father, mother, sister, son, nephew  Substance abuse - father, sister  Anesthesia reaction - father (stroke right after surgery)  Osteoarthritis - father  Ulcerative colitis  - father  Hypertension - mother  Thyroid disease - mother, maternal grandmother, sister, nephew  Angina - mother  Gastroesophageal reflux disease (GERD) - mother  Skin cancer - mother  Diabetes - paternal grandfather, maternal grandfather, paternal cousin, paternal aunt, paternal uncle  Coronary artery disease - sister (angioplasty)  Anxiety disorder - son,  "nephew  Heart disease - paternal aunt, paternal uncle      Social History:   The patient is  with three children, a former smoker (0.5 ppd for 6 years, quit 1992), and occasionally consumes alcohol.      Physical Exam:   /72 (BP Location: Right arm, Patient Position: Chair, Cuff Size: Adult Regular)   Pulse 87   Temp 98  F (36.7  C) (Oral)   Resp 16   Ht 1.72 m (5' 7.72\")   Wt 70.9 kg (156 lb 3.2 oz)   SpO2 95%   Breastfeeding? No   BMI 23.95 kg/m     Wt Readings from Last 1 Encounters:   09/17/19 70.9 kg (156 lb 3.2 oz)     Constitutional: no distress, comfortable, pleasant   Eyes: anicteric, conjunctiva pink  Ears, Nose and Throat: tympanic membranes clear, throat clear.   Neck: supple with full range of motion, no thyromegaly.   Cardiovascular: regular rate and rhythm, normal S1 and S2, no murmurs, rubs or gallops, peripheral pulses full and symmetric   Respiratory: clear to auscultation, no wheezes or crackles, normal breath sounds   Gastrointestinal: positive bowel sounds, nontender, no hepatosplenomegaly, no masses   Musculoskeletal: full range of motion, no edema   Skin: no concerning lesions, no jaundice, temp normal   Neurological:  normal speech, no tremor. A and O x 3, good historian.  Breasts:  Normal to exam.  Psychological: appropriate mood, good eye contact, normal affect   Lymph: no axillary, cervical,  supraclavicular, infraclavicular l nodes.       Assessment and Plan:  Chronic pain syndrome, Headache disorder  Refills provided.  - amitriptyline (ELAVIL) 10 MG tablet  Dispense: 120 tablet; Refill: 11  - ZOLMitriptan (ZOMIG) 5 MG tablet  Dispense: 9 tablet; Refill: 3    Seasonal allergies  Refill of Astelin spray provided and prescribed Singulair 10 mg for her to add as she has been having bothersome allergy symptoms and allergies trigger her migraines.  - azelastine (ASTELIN) 0.1 % nasal spray  Dispense: 1 Bottle; Refill: 11  - montelukast (SINGULAIR) 10 MG tablet  Dispense: 30 " tablet; Refill: 3    Atrophic vaginitis  Refill provided.  - estradiol (VAGIFEM) 10 MCG TABS vaginal tablet  Dispense: 24 tablet; Refill: 3    Hypothyroidism, unspecified type  Refill provided. TSH last checked in February, was 1.83, so no need for dosage change.  - levothyroxine (SYNTHROID) 137 MCG tablet  Dispense: 90 tablet; Refill: 3    Gastroesophageal reflux disease without esophagitis  Refill provided.  - omeprazole (PRILOSEC) 40 MG DR capsule  Dispense: 90 capsule; Refill: 3    Elevated glucose  Routine diabetes labs. She denies any high blood sugar readings lately.   - Hemoglobin A1c  - Albumin Random Urine Quantitative with Creat Ratio    Plantar fasciitis  She has very bothersome left foot pain every day which has not resolved despite numerous therapies. Will obtain X-ray to check for bone spur.  - XR Foot Left 2 Views    Hx of iron deficiency anemia  She has been feeling fatigued, ordered iron labs at her request.   - IRON/IRON BINDING CAPACITY    Need for influenza vaccination  Influenza vaccine administered in clinic today.   - FLU VAC PRESRV FREE QUAD SPLIT VIR 3+YRS IM    Total time spent 25 minutes.  More than 50% of the time spent with Ms. Mcghee on counseling / coordinating her care.    Latasha INGRAM CNP        Scribe Disclosure:  I, Roberta Landa, am serving as a scribe to document services personally performed by NATALY Somers CNP at this visit, based upon the provider's statements to me. All documentation has been reviewed by the aforementioned provider prior to being entered into the official medical record.     Portions of this medical record were completed by a scribe. UPON MY REVIEW AND AUTHENTICATION BY ELECTRONIC SIGNATURE, this confirms (a) I performed the applicable clinical services, and (b) the record is accurate.

## 2019-09-19 LAB
IGG SERPL-MCNC: 953 MG/DL (ref 695–1620)
IGG1 SER-MCNC: 390 MG/DL (ref 382–929)
IGG2 SER-MCNC: 349 MG/DL (ref 242–700)
IGG3 SER-MCNC: 64 MG/DL (ref 22–176)
IGG4 SER-MCNC: 86 MG/DL (ref 4–86)

## 2019-09-20 ENCOUNTER — MYC MEDICAL ADVICE (OUTPATIENT)
Dept: INTERNAL MEDICINE | Facility: CLINIC | Age: 54
End: 2019-09-20

## 2019-09-23 NOTE — TELEPHONE ENCOUNTER
Dinora,  Would you mind directing this question to Dr. Middleton as Dr. Middleton ordered the infusions in the past?    Your hemoglobin is 13.7.    Thanks.     Latasha INGRAM, CNP

## 2019-10-01 NOTE — RESULT ENCOUNTER NOTE
IgG-4 is now normal. Liver tests are improved. Good news! Dr. Lugo said you don't  need to see her as I continue to monitor your labs.

## 2019-11-04 ENCOUNTER — HEALTH MAINTENANCE LETTER (OUTPATIENT)
Age: 54
End: 2019-11-04

## 2019-12-13 DIAGNOSIS — J30.2 SEASONAL ALLERGIES: ICD-10-CM

## 2019-12-13 RX ORDER — MONTELUKAST SODIUM 10 MG/1
10 TABLET ORAL AT BEDTIME
Qty: 90 TABLET | Refills: 0 | Status: SHIPPED | OUTPATIENT
Start: 2019-12-13 | End: 2020-03-19

## 2019-12-16 DIAGNOSIS — E13.9 POST-PANCREATECTOMY DIABETES (H): Primary | ICD-10-CM

## 2019-12-16 DIAGNOSIS — Z90.410 POST-PANCREATECTOMY DIABETES (H): Primary | ICD-10-CM

## 2019-12-16 DIAGNOSIS — E89.1 POST-PANCREATECTOMY DIABETES (H): Primary | ICD-10-CM

## 2019-12-16 DIAGNOSIS — K86.89 PANCREATIC INSUFFICIENCY: ICD-10-CM

## 2019-12-17 DIAGNOSIS — K21.9 GASTROESOPHAGEAL REFLUX DISEASE, ESOPHAGITIS PRESENCE NOT SPECIFIED: ICD-10-CM

## 2019-12-31 DIAGNOSIS — Z90.410 ACQUIRED TOTAL ABSENCE OF PANCREAS: ICD-10-CM

## 2020-01-14 ENCOUNTER — MYC MEDICAL ADVICE (OUTPATIENT)
Dept: INTERNAL MEDICINE | Facility: CLINIC | Age: 55
End: 2020-01-14

## 2020-01-31 ENCOUNTER — ALLIED HEALTH/NURSE VISIT (OUTPATIENT)
Dept: TRANSPLANT | Facility: CLINIC | Age: 55
End: 2020-01-31
Payer: COMMERCIAL

## 2020-01-31 ENCOUNTER — ALLIED HEALTH/NURSE VISIT (OUTPATIENT)
Dept: INTERNAL MEDICINE | Facility: CLINIC | Age: 55
End: 2020-01-31
Payer: COMMERCIAL

## 2020-01-31 VITALS
OXYGEN SATURATION: 98 % | WEIGHT: 157.85 LBS | SYSTOLIC BLOOD PRESSURE: 127 MMHG | HEART RATE: 78 BPM | BODY MASS INDEX: 24.2 KG/M2 | DIASTOLIC BLOOD PRESSURE: 81 MMHG

## 2020-01-31 DIAGNOSIS — K86.89 PANCREATIC INSUFFICIENCY: ICD-10-CM

## 2020-01-31 DIAGNOSIS — Z23 NEED FOR VACCINATION: Primary | ICD-10-CM

## 2020-01-31 DIAGNOSIS — E13.9 POST-PANCREATECTOMY DIABETES (H): ICD-10-CM

## 2020-01-31 DIAGNOSIS — Z90.410 POST-PANCREATECTOMY DIABETES (H): ICD-10-CM

## 2020-01-31 DIAGNOSIS — E89.1 POST-PANCREATECTOMY DIABETES (H): ICD-10-CM

## 2020-01-31 DIAGNOSIS — Z23 ENCOUNTER FOR IMMUNIZATION: Primary | ICD-10-CM

## 2020-01-31 DIAGNOSIS — D89.84 IGG4-RELATED SCLEROSING DISEASE (H): ICD-10-CM

## 2020-01-31 LAB
ALBUMIN SERPL-MCNC: 4.1 G/DL (ref 3.4–5)
ALP SERPL-CCNC: 106 U/L (ref 40–150)
ALT SERPL W P-5'-P-CCNC: 85 U/L (ref 0–50)
ANION GAP SERPL CALCULATED.3IONS-SCNC: 5 MMOL/L (ref 3–14)
AST SERPL W P-5'-P-CCNC: 53 U/L (ref 0–45)
BASOPHILS # BLD AUTO: 0.1 10E9/L (ref 0–0.2)
BASOPHILS NFR BLD AUTO: 2.4 %
BILIRUB DIRECT SERPL-MCNC: 0.2 MG/DL (ref 0–0.2)
BILIRUB SERPL-MCNC: 0.9 MG/DL (ref 0.2–1.3)
BUN SERPL-MCNC: 10 MG/DL (ref 7–30)
C PEPTIDE SERPL-MCNC: 1.1 NG/ML (ref 0.9–6.9)
CALCIUM SERPL-MCNC: 9.5 MG/DL (ref 8.5–10.1)
CHLORIDE SERPL-SCNC: 106 MMOL/L (ref 94–109)
CHOLEST SERPL-MCNC: 204 MG/DL
CO2 SERPL-SCNC: 30 MMOL/L (ref 20–32)
CREAT SERPL-MCNC: 0.68 MG/DL (ref 0.52–1.04)
DIFFERENTIAL METHOD BLD: NORMAL
EOSINOPHIL # BLD AUTO: 0.3 10E9/L (ref 0–0.7)
EOSINOPHIL NFR BLD AUTO: 6.5 %
ERYTHROCYTE [DISTWIDTH] IN BLOOD BY AUTOMATED COUNT: 13.3 % (ref 10–15)
FERRITIN SERPL-MCNC: 241 NG/ML (ref 8–252)
GFR SERPL CREATININE-BSD FRML MDRD: >90 ML/MIN/{1.73_M2}
GLUCOSE SERPL-MCNC: 112 MG/DL (ref 70–99)
GLUCOSE SERPL-MCNC: 121 MG/DL (ref 70–99)
GLUCOSE SERPL-MCNC: 91 MG/DL (ref 70–99)
HBA1C MFR BLD: 5.6 % (ref 0–5.6)
HCT VFR BLD AUTO: 43.9 % (ref 35–47)
HDLC SERPL-MCNC: 86 MG/DL
HGB BLD-MCNC: 14.3 G/DL (ref 11.7–15.7)
IMM GRANULOCYTES # BLD: 0 10E9/L (ref 0–0.4)
IMM GRANULOCYTES NFR BLD: 0 %
IRON SATN MFR SERPL: 45 % (ref 15–46)
IRON SERPL-MCNC: 112 UG/DL (ref 35–180)
LDLC SERPL CALC-MCNC: 108 MG/DL
LYMPHOCYTES # BLD AUTO: 1.5 10E9/L (ref 0.8–5.3)
LYMPHOCYTES NFR BLD AUTO: 36.4 %
MCH RBC QN AUTO: 31.4 PG (ref 26.5–33)
MCHC RBC AUTO-ENTMCNC: 32.6 G/DL (ref 31.5–36.5)
MCV RBC AUTO: 97 FL (ref 78–100)
MONOCYTES # BLD AUTO: 0.5 10E9/L (ref 0–1.3)
MONOCYTES NFR BLD AUTO: 13 %
NEUTROPHILS # BLD AUTO: 1.7 10E9/L (ref 1.6–8.3)
NEUTROPHILS NFR BLD AUTO: 41.7 %
NONHDLC SERPL-MCNC: 118 MG/DL
NRBC # BLD AUTO: 0 10*3/UL
NRBC BLD AUTO-RTO: 0 /100
PLATELET # BLD AUTO: 337 10E9/L (ref 150–450)
POTASSIUM SERPL-SCNC: 4 MMOL/L (ref 3.4–5.3)
PREALB SERPL IA-MCNC: 23 MG/DL (ref 15–45)
PROT SERPL-MCNC: 7.6 G/DL (ref 6.8–8.8)
RBC # BLD AUTO: 4.55 10E12/L (ref 3.8–5.2)
SODIUM SERPL-SCNC: 141 MMOL/L (ref 133–144)
TIBC SERPL-MCNC: 247 UG/DL (ref 240–430)
TRIGL SERPL-MCNC: 49 MG/DL
VIT B12 SERPL-MCNC: 1693 PG/ML (ref 193–986)
WBC # BLD AUTO: 4.2 10E9/L (ref 4–11)

## 2020-01-31 PROCEDURE — 80061 LIPID PANEL: CPT | Performed by: PEDIATRICS

## 2020-01-31 PROCEDURE — 83550 IRON BINDING TEST: CPT | Performed by: PEDIATRICS

## 2020-01-31 PROCEDURE — 82306 VITAMIN D 25 HYDROXY: CPT | Performed by: PEDIATRICS

## 2020-01-31 PROCEDURE — 82787 IGG 1 2 3 OR 4 EACH: CPT | Performed by: PEDIATRICS

## 2020-01-31 PROCEDURE — 82248 BILIRUBIN DIRECT: CPT | Performed by: PEDIATRICS

## 2020-01-31 PROCEDURE — 83036 HEMOGLOBIN GLYCOSYLATED A1C: CPT | Performed by: PEDIATRICS

## 2020-01-31 PROCEDURE — 36415 COLL VENOUS BLD VENIPUNCTURE: CPT | Performed by: PEDIATRICS

## 2020-01-31 PROCEDURE — 84134 ASSAY OF PREALBUMIN: CPT | Performed by: PEDIATRICS

## 2020-01-31 PROCEDURE — 80053 COMPREHEN METABOLIC PANEL: CPT | Performed by: PEDIATRICS

## 2020-01-31 PROCEDURE — 82542 COL CHROMOTOGRAPHY QUAL/QUAN: CPT | Performed by: PEDIATRICS

## 2020-01-31 PROCEDURE — 85025 COMPLETE CBC W/AUTO DIFF WBC: CPT | Performed by: PEDIATRICS

## 2020-01-31 PROCEDURE — 82784 ASSAY IGA/IGD/IGG/IGM EACH: CPT | Performed by: PEDIATRICS

## 2020-01-31 PROCEDURE — 84630 ASSAY OF ZINC: CPT | Performed by: PEDIATRICS

## 2020-01-31 PROCEDURE — 82728 ASSAY OF FERRITIN: CPT | Performed by: PEDIATRICS

## 2020-01-31 PROCEDURE — 82947 ASSAY GLUCOSE BLOOD QUANT: CPT | Performed by: PEDIATRICS

## 2020-01-31 PROCEDURE — 84681 ASSAY OF C-PEPTIDE: CPT | Mod: 91 | Performed by: PEDIATRICS

## 2020-01-31 PROCEDURE — 82607 VITAMIN B-12: CPT | Performed by: PEDIATRICS

## 2020-01-31 PROCEDURE — 83540 ASSAY OF IRON: CPT | Performed by: PEDIATRICS

## 2020-01-31 PROCEDURE — 82747 ASSAY OF FOLIC ACID RBC: CPT | Performed by: PEDIATRICS

## 2020-01-31 PROCEDURE — 84590 ASSAY OF VITAMIN A: CPT | Performed by: PEDIATRICS

## 2020-01-31 PROCEDURE — 84446 ASSAY OF VITAMIN E: CPT | Performed by: PEDIATRICS

## 2020-01-31 NOTE — LETTER
Patient:  Dinora Mcghee  :   1965  MRN:     [id number]        Ms.Elaine Racquel Mcghee  [address] [facility] [address]        2020    Dear ,    We are writing to inform you of your test results. IgG 4 is stable/almost normal.    Component      Latest Ref Rng & Units 2020   IGG      610 - 1,616 mg/dL 918   IgG1      382 - 929 mg/dL 460   IgG2      242 - 700 mg/dL 403   IgG3      22 - 176 mg/dL 71   IgG4      4 - 86 mg/dL 87 (H)       Anita Becerra MD

## 2020-02-01 LAB
FOLATE RBC-MCNC: 599 NG/ML
HCT VFR BLD CALC: NORMAL %

## 2020-02-02 LAB — ZINC SERPL-MCNC: 83.1 UG/DL (ref 60–120)

## 2020-02-03 LAB
A-TOCOPHEROL VIT E SERPL-MCNC: 14.3 MG/L (ref 5.5–18)
ANNOTATION COMMENT IMP: NORMAL
BETA+GAMMA TOCOPHEROL SERPL-MCNC: 0.4 MG/L (ref 0–6)
C PEPTIDE SERPL-MCNC: 2.7 NG/ML (ref 0.9–6.9)
C PEPTIDE SERPL-MCNC: 3.6 NG/ML (ref 0.9–6.9)
IGG SERPL-MCNC: 918 MG/DL (ref 610–1616)
IGG1 SER-MCNC: 460 MG/DL (ref 382–929)
IGG2 SER-MCNC: 403 MG/DL (ref 242–700)
IGG3 SER-MCNC: 71 MG/DL (ref 22–176)
IGG4 SER-MCNC: 87 MG/DL (ref 4–86)
RETINYL PALMITATE SERPL-MCNC: <0.02 MG/L (ref 0–0.1)
VIT A SERPL-MCNC: 0.42 MG/L (ref 0.3–1.2)

## 2020-02-04 LAB
DEPRECATED CALCIDIOL+CALCIFEROL SERPL-MC: <54 UG/L (ref 20–75)
VITAMIN D2 SERPL-MCNC: <5 UG/L
VITAMIN D3 SERPL-MCNC: 49 UG/L

## 2020-02-05 LAB
A-LINOLENATE SERPL-SCNC: 84 NMOL/ML (ref 50–130)
AA SERPL-SCNC: 1220 NMOL/ML (ref 520–1490)
ARACHIDATE SERPL-SCNC: 31 NMOL/ML (ref 50–90)
CLINICAL BIOCHEMIST REVIEW: ABNORMAL
DHA SERPL-SCNC: 140 NMOL/ML (ref 30–250)
DOCOSAPENTAENATE W6 SERPL-SCNC: 40 NMOL/ML (ref 10–70)
DOCOSATETRAENOATE SERPL-SCNC: 47 NMOL/ML (ref 10–80)
DOCOSENOATE SERPL-SCNC: 11 NMOL/ML (ref 4–13)
DPA SERPL-SCNC: 104 NMOL/ML (ref 20–210)
EPA SERPL-SCNC: 52 NMOL/ML (ref 14–100)
FA SERPL-SCNC: 11.4 MMOL/L (ref 7.3–16.8)
G-LINOLENATE SERPL-SCNC: 55 NMOL/ML (ref 16–150)
HEXADECENOATE SERPL-SCNC: 33 NMOL/ML (ref 25–105)
HOMO-G LINOLENATE SERPL-SCNC: 225 NMOL/ML (ref 50–250)
LAURATE SERPL-SCNC: 14 NMOL/ML (ref 6–90)
LINOLEATE SERPL-SCNC: 3742 NMOL/ML (ref 2270–3850)
MEAD ACID SERPL-SCNC: 48 NMOL/ML (ref 7–30)
MONOUNSAT FA SERPL-SCNC: 2.3 MMOL/L (ref 1.3–5.8)
MYRISTATE SERPL-SCNC: 112 NMOL/ML (ref 30–450)
NERVONATE SERPL-SCNC: 113 NMOL/ML (ref 60–100)
OCTADECANOATE SERPL-SCNC: 858 NMOL/ML (ref 590–1170)
OLEATE SERPL-SCNC: 1615 NMOL/ML (ref 650–3500)
PALMITATE SERPL-SCNC: 2213 NMOL/ML (ref 1480–3730)
PALMITOLEATE SERPL-SCNC: 235 NMOL/ML (ref 110–1130)
POLYUNSAT FA SERPL-SCNC: 5.8 MMOL/L (ref 3.2–5.8)
SAT FA SERPL-SCNC: 3.4 MMOL/L (ref 2.5–5.5)
TRIENOATE/AA SERPL-SRTO: 0.04 {RATIO} (ref 0.01–0.04)
VACCENATE SERPL-SCNC: 261 NMOL/ML (ref 280–740)
W3 FA SERPL-SCNC: 0.4 MMOL/L (ref 0.2–0.5)
W6 FA SERPL-SCNC: 5.3 MMOL/L (ref 3–5.4)

## 2020-02-17 DIAGNOSIS — K59.09 OTHER CONSTIPATION: ICD-10-CM

## 2020-02-18 RX ORDER — LUBIPROSTONE 8 UG/1
5 CAPSULE ORAL DAILY
Qty: 150 CAPSULE | Refills: 1 | Status: SHIPPED | OUTPATIENT
Start: 2020-02-18 | End: 2020-03-19

## 2020-02-18 NOTE — TELEPHONE ENCOUNTER
Last Clinic Visit: 7/2/2019  [facility]

## 2020-02-26 ENCOUNTER — OFFICE VISIT (OUTPATIENT)
Dept: FAMILY MEDICINE | Facility: CLINIC | Age: 55
End: 2020-02-26
Payer: COMMERCIAL

## 2020-02-26 VITALS
RESPIRATION RATE: 16 BRPM | HEART RATE: 77 BPM | BODY MASS INDEX: 23.67 KG/M2 | HEIGHT: 68 IN | WEIGHT: 156.2 LBS | SYSTOLIC BLOOD PRESSURE: 122 MMHG | OXYGEN SATURATION: 100 % | DIASTOLIC BLOOD PRESSURE: 82 MMHG | TEMPERATURE: 98.1 F

## 2020-02-26 DIAGNOSIS — J10.1 INFLUENZA A: Primary | ICD-10-CM

## 2020-02-26 DIAGNOSIS — J00 ACUTE RHINITIS: Primary | ICD-10-CM

## 2020-02-26 LAB
FLUAV+FLUBV AG SPEC QL: NEGATIVE
FLUAV+FLUBV AG SPEC QL: POSITIVE
SPECIMEN SOURCE: ABNORMAL

## 2020-02-26 RX ORDER — OSELTAMIVIR PHOSPHATE 75 MG/1
75 CAPSULE ORAL 2 TIMES DAILY
Qty: 10 CAPSULE | Refills: 0 | Status: SHIPPED | OUTPATIENT
Start: 2020-02-26 | End: 2020-03-02

## 2020-02-26 ASSESSMENT — MIFFLIN-ST. JEOR: SCORE: 1357.58

## 2020-02-26 ASSESSMENT — PAIN SCALES - GENERAL: PAINLEVEL: NO PAIN (0)

## 2020-02-26 NOTE — PROGRESS NOTES
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    She developed a fever on 2/23/2020. She has concern as this has been her first fever in the three years since her pancreatectomy and islet cell transplant done by Dr. Phoenix. Other symptoms include ear ache, head stuffy, joint pain, fatigue, night sweats, and temperature of 101 F the first two days. She has has some post nasal drip causing minor cough and minor sore throat. She denies nausea, vomit diarrhea, urinary symptoms, or rash. She does not know any one else sick but recently went to a music festival in her town, [place], [place]. No other recent travels. She endorses getting the flu vaccination. She did take tylenol this morning. She has a history of pneumonia, allergies, and sinus difficulty (but per patient allergic to most antibiotics). She denies ear or throat difficulties. She has a history of using tobacco 30 years ago in college. She has history of gastroparesis which she manages with diet, exercise and creon.     She checks her BG a few times a day. Her fasting BG has been riding between 140 - 152 mg/dL the past few days while sick. She is concerned about needing to start insulin.     Notably, this morning she woke up with a stabbing pain in her right lower back. Now, it is more of a dull pain.      Review of Systems:   Pertinent items are noted in HPI or as in patient entered ROS below, remainder of complete ROS is negative.     Active Medications:      amitriptyline (ELAVIL) 10 MG tablet, Take 4 tablets (40 mg) by mouth At Bedtime Take 40 mg at bedtime, Disp: 120 tablet, Rfl: 11     amylase-lipase-protease (CREON 24) 48508-97028 units CPEP per EC  capsule, Take 3-4 capsules by mouth with meals and 1-2 with snacks. Maximum 15 capsules per day., Disp: 450 capsule, Rfl: 6     aspirin 81 MG EC tablet, Take 1 tablet (81 mg) by mouth daily, Disp: 90 tablet, Rfl: 3     azelastine (ASTELIN) 0.1 % nasal spray, Spray 1 spray into both nostrils 2 times daily, Disp: 1 Bottle, Rfl: 11     calcium carbonate-vitamin D (CALCIUM 500/D) 500-200 MG-UNIT tablet, , Disp: , Rfl:      cetirizine (ZYRTEC) 10 MG tablet, Take 10 mg by mouth daily, Disp: , Rfl:      diphenhydrAMINE (BENADRYL ALLERGY) 25 MG capsule, Take 1 capsule (25 mg) by mouth every 6 hours as needed for itching or allergies, Disp: 56 capsule, Rfl: 0     docusate sodium (COLACE) 100 MG capsule, Take 1 capsule (100 mg) by mouth 2 times daily as needed for constipation,, Disp: 60 capsule, Rfl: 0     estradiol (VAGIFEM) 10 MCG TABS vaginal tablet, Place 1 tablet (10 mcg) vaginally twice a week, Disp: 24 tablet, Rfl: 3     fish oil-omega-3 fatty acids 1000 MG capsule, , Disp: , Rfl:      FOLIC ACID PO, Take 1 mg by mouth daily, Disp: , Rfl:      glucose 40 % GEL gel, Take 15-30 g by mouth every 15 minutes as needed for low blood sugar, Disp: 3 Tube, Rfl: 2     levothyroxine (SYNTHROID) 137 MCG tablet, Take 1 tablet (137 mcg) by mouth daily, Disp: 90 tablet, Rfl: 3     lubiprostone (AMITIZA) 8 MCG PO capsule, Take 5 capsules (40 mcg) by mouth daily Take 3 capsules in AM and 2 capsules in PM, Disp: 150 capsule, Rfl: 1     magnesium oxide 200 MG TABS, , Disp: , Rfl:      montelukast (SINGULAIR) 10 MG tablet, Take 1 tablet (10 mg) by mouth At Bedtime, Disp: 90 tablet, Rfl: 0     multivitamin CF formula (CHOICEFUL) capsule, Take 1 tablet by mouth daily, Disp: , Rfl: 11     Nutritional Supplements (BOOST HIGH PROTEIN) LIQD, After above baseline labs are drawn, give: 6 mL/kg to maximum of 360 mL; the beverage is to be consumed within 5 minutes., Disp: , Rfl: 0     omeprazole (PRILOSEC) 40 MG DR capsule, Take 1 capsule (40 mg)  by mouth daily, Disp: 90 capsule, Rfl: 3     omeprazole (PRILOSEC) 40 MG DR capsule, Take 1 capsule (40 mg) by mouth At Bedtime, Disp: 90 capsule, Rfl: 3     prochlorperazine (COMPAZINE) 5 MG tablet, Take two 5 mg tablets by mouth every six hours as needed for nausea., Disp: 90 tablet, Rfl: 3     promethazine (PHENERGAN) 25 MG tablet, Take 1 tablet (25 mg) by mouth At Bedtime, Disp: 60 tablet, Rfl: 3     ranitidine (ZANTAC) 150 MG tablet, Take 2 tablets (300 mg) by mouth daily, Disp: 180 tablet, Rfl: 1     senna-docusate (SENOKOT-S;PERICOLACE) 8.6-50 MG per tablet, Take 1-2 tablets by mouth 2 times daily, Disp: 100 tablet, Rfl: 0     Sharps Container (BD SHARPS ) MISC, 1 Container as needed, Disp: 1 each, Rfl: 1     ZOLMitriptan (ZOMIG) 5 MG tablet, Take 1 tablet (5 mg) by mouth at onset of headache for migraine, Disp: 9 tablet, Rfl: 3      Allergies:   Apple; Depakote [divalproex sodium]; Zithromax [azithromycin dihydrate]; Darvocet [propoxyphene n-apap]; Morphine; Nalbuphine hcl; Zosyn [piperacillin-tazobactam in d5w]; Bactrim [sulfamethoxazole w-trimethoprim]; Cats; Compazine [prochlorperazine]; Corticosteroids; Dust mites; Grass; Prednisone; Ragweeds; Tape [adhesive tape]; Tramadol; Trees; Zofran [ondansetron]; and Flagyl [metronidazole]      Past Medical History:  Allergic rhinitis  Arthritis  Choledocholithiasis   Chronic GI symptoms  Pancreatitis  Degeneration of lumbar or lumbosacral intervertebral disc  Esophageal reflux  Gastroparesis  Hiatal hernia   Hypothyroidism  Migraines  Pituitary adenoma  Hyperlipidemia  on feeding tube diet  Sprain/strain of hip and thigh  Chondromalacia of patella  Sensorineural hearing loss  Vertiginous syndrome and labyrinthine disorder  ERCP  Post pancreatectomy diabetes  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  Incisional hernia  Adhesive capsulitis of shoulder  Chronic pain  Iron deficiency anemia      Past Surgical History:  C sections - 1993, 1996,  "1998  Appendectomy  Excise pituitary - 1980  Total hysterectomy - 1999  Wrist arthroscope - 2008  Colonoscopy - 2014  Endoscopic retrograde cholangiopancreatogram - 2015, 2016 (x4)  Esophagoscopy, gastroscopy, duodenoscopy - 2015  Excise lesion trunk - 2017  Esoph/gas reflux test - 2015  UGI endoscopy diag w/ biopsy - 2008, 2012, 2015  Inject transversus abdomen plane block - 2016  Laparoscopic herniorrhaphy incisional - 1995  Pancreatectomy - 2016  Transphenoidal pituitary resection - 1990     Family History:   Cataract - father, paternal grandmother and grandfather  Myocardial infarction - father  Lipids - father, mother  Cerebrovascular disease - father  Depression - father, mother, sister, son  anxiety - son   Angina - mother  GERD - mother, sister  Skin cancer - mother  Eye disorder - son  Substance abuse - father, sister  Anesthesia reaction - father  Osteoarthritis - father   Ulcerative colitis - father  Hypertension - mother   Thyroid disease - mother, maternal grandmother, sister   Diabetes - maternal grandfather, paternal aunt and uncle , cousin  Heart disease - paternal aunt and uncle      Social History:   This patient presented alone.   Smoking status: former smoker quit in 1992  Smokeless tobacco: never  Alcohol use: yes, 3-6 drinks a week  Drug use: no     Physical Exam:   /82 (BP Location: Right arm, Patient Position: Sitting, Cuff Size: Adult Regular)   Pulse 77   Temp 98.1  F (36.7  C) (Oral)   Resp 16   Ht 1.72 m (5' 7.72\")   Wt 70.9 kg (156 lb 3.2 oz)   SpO2 100%   BMI 23.95 kg/m     Constitutional: Alert. In no distress.  Head: Normocephalic.   ENT: No neck nodes or sinus tenderness.  Cardiovascular: RRR. No murmurs, clicks, gallops, or rub.  Respiratory: Clear to auscultation bilaterally, no wheezes or crackles.  Musculoskeletal: No gross deformities noted.   Psychiatric: Mentation appears normal. Normal affect.        Assessment and Plan:  Acute rhinitis  Will do flu swab today, " if abnormal we will start Tamiflu. Reviewed signs of worsening infection. At next visit we should boost Bexsero.   - Influenza A/B antigen - Rapid Nasal Swab, Clinic Collect     Follow-up: PRN.       Flu, will get on Tamiflu, checked w/ PharmD re: allergies first  Scribe Disclosure:  I, Paloma Diez, am serving as a scribe to document services personally performed by Wale Cabello MD at this visit, based upon the provider's statements to me. All documentation has been reviewed by the aforementioned provider prior to being entered into the official medical record.     Portions of this medical record were completed by a scribe. UPON MY REVIEW AND AUTHENTICATION BY ELECTRONIC SIGNATURE, this confirms (a) I performed the applicable clinical services, and (b) the record is accurate.   Wale Cabello MD MD

## 2020-02-26 NOTE — TELEPHONE ENCOUNTER
----- Message from Wale Cabello MD sent at 2/26/2020  2:00 PM CST -----  Please call her. She has the flu. 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

## 2020-02-26 NOTE — PATIENT INSTRUCTIONS
[facility] Medication Refill Request Information:  * Please contact your pharmacy regarding ANY request for medication refills.  ** [facility] Prescription Fax = [phone]  * Please allow 3 business days for routine medication refills.  * Please allow 5 business days for controlled substance medication refills.     [facility] Test Result notification information:  *You will be notified with in 7-10 days of your appointment day regarding the results of your test.  If you are on MyChart you will be notified as soon as the provider has reviewed the results and signed off on them.    [facility]: [phone]

## 2020-02-26 NOTE — NURSING NOTE
Chief Complaint   Patient presents with     URI     Pt complains of a cold for the past day.         Drew Boyce, EMT on 2/26/2020 at 11:57 AM

## 2020-02-27 NOTE — TELEPHONE ENCOUNTER
There are no precautions or contraindications to using Tamiflu if a patient has a known allergy to piperacillin-tazobactam.

## 2020-03-13 DIAGNOSIS — J30.2 SEASONAL ALLERGIES: ICD-10-CM

## 2020-03-19 ENCOUNTER — MYC REFILL (OUTPATIENT)
Dept: GASTROENTEROLOGY | Facility: CLINIC | Age: 55
End: 2020-03-19

## 2020-03-19 DIAGNOSIS — K59.09 OTHER CONSTIPATION: ICD-10-CM

## 2020-03-19 RX ORDER — MONTELUKAST SODIUM 10 MG/1
TABLET ORAL
Qty: 90 TABLET | Refills: 0 | Status: SHIPPED | OUTPATIENT
Start: 2020-03-19 | End: 2020-06-12

## 2020-03-19 NOTE — TELEPHONE ENCOUNTER
montelukast (SINGULAIR) 10 MG tablet  Last Written Prescription Date:  12/13/19  Last Fill Quantity: 90,   # refills: 0  Last Office Visit : 2/26/20  Future Office visit:  none    Refill to pharmacy.

## 2020-03-24 DIAGNOSIS — K59.09 OTHER CONSTIPATION: ICD-10-CM

## 2020-03-24 NOTE — TELEPHONE ENCOUNTER
lubiprostone (AMITIZA) 8 MCG   Last Written Prescription Date:  2/18/20  Last Fill Quantity: 150,   # refills: 1  Last Office Visit : 7/2/19  Future Office visit:  NONE    Routing refill request to provider for review/approval because:  MED OVER RIDE   REPEAT RF

## 2020-03-26 RX ORDER — LUBIPROSTONE 8 UG/1
5 CAPSULE, GELATIN COATED ORAL DAILY
Qty: 150 CAPSULE | Refills: 1 | Status: SHIPPED | OUTPATIENT
Start: 2020-03-26 | End: 2020-07-06

## 2020-03-30 RX ORDER — LUBIPROSTONE 8 UG/1
5 CAPSULE, GELATIN COATED ORAL DAILY
Qty: 150 CAPSULE | Refills: 1 | OUTPATIENT
Start: 2020-03-30

## 2020-06-10 DIAGNOSIS — J30.2 SEASONAL ALLERGIES: ICD-10-CM

## 2020-06-12 RX ORDER — MONTELUKAST SODIUM 10 MG/1
TABLET ORAL
Qty: 90 TABLET | Refills: 3 | Status: SHIPPED | OUTPATIENT
Start: 2020-06-12 | End: 2020-08-26

## 2020-06-12 NOTE — TELEPHONE ENCOUNTER
[facility] 2/26/20: Passes Leukotriene inhibitor protocol> allergy, 12 month refill. No ACT needed. Per chart:  She is prescribed Singulair 10 mg as she has been having bothersome allergy symptoms and allergies trigger her migraines.

## 2020-07-02 DIAGNOSIS — K21.9 GASTROESOPHAGEAL REFLUX DISEASE WITHOUT ESOPHAGITIS: ICD-10-CM

## 2020-07-02 DIAGNOSIS — G89.4 CHRONIC PAIN SYNDROME: ICD-10-CM

## 2020-07-02 DIAGNOSIS — R51.9 HEADACHE DISORDER: ICD-10-CM

## 2020-07-03 ENCOUNTER — TELEPHONE (OUTPATIENT)
Dept: GASTROENTEROLOGY | Facility: CLINIC | Age: 55
End: 2020-07-03

## 2020-07-03 DIAGNOSIS — K59.09 OTHER CONSTIPATION: ICD-10-CM

## 2020-07-03 NOTE — TELEPHONE ENCOUNTER
[facility] Call Center    Phone Message    May a detailed message be left on voicemail: yes     Reason for Call: Medication Refill Request    Has the patient contacted the pharmacy for the refill? Yes   Name of medication being requested: lubiprostone (AMITIZA) 8 MCG capsule  Provider who prescribed the medication: Rosanne Marti  Pharmacy: [facility]   Date medication is needed: ASAP         Action Taken: Message routed to:  [facility] GI    Travel Screening: Not Applicable

## 2020-07-03 NOTE — TELEPHONE ENCOUNTER
[facility] Call Center    Phone Message    May a detailed message be left on voicemail: yes     Reason for Call: Other: Pt calling in to let care team know she is completely out of her medication and is requesting a call back to discuss what to do to get through the weekend please.      Action Taken: Message routed to:  [facility] GI    Travel Screening: Not Applicable

## 2020-07-06 RX ORDER — AMITRIPTYLINE HYDROCHLORIDE 10 MG/1
TABLET ORAL
Qty: 120 TABLET | Refills: 11 | OUTPATIENT
Start: 2020-07-06

## 2020-07-06 RX ORDER — OMEPRAZOLE 40 MG/1
CAPSULE, DELAYED RELEASE ORAL
Qty: 90 CAPSULE | Refills: 3 | OUTPATIENT
Start: 2020-07-06

## 2020-07-06 RX ORDER — LUBIPROSTONE 8 UG/1
CAPSULE, GELATIN COATED ORAL
Qty: 150 CAPSULE | Refills: 0 | Status: SHIPPED | OUTPATIENT
Start: 2020-07-06 | End: 2020-08-05

## 2020-07-06 NOTE — TELEPHONE ENCOUNTER
Contacted patient to discuss refill of amitiza. Patient last seen a year ago. Will refill amitiza for one month.  Pt scheduled for virtual visit on August 5 at 8 am. Also would like to discuss ppi replacements and her symptoms.

## 2020-07-10 ENCOUNTER — MYC REFILL (OUTPATIENT)
Dept: GASTROENTEROLOGY | Facility: CLINIC | Age: 55
End: 2020-07-10

## 2020-07-10 DIAGNOSIS — R11.0 NAUSEA: ICD-10-CM

## 2020-07-10 RX ORDER — PROMETHAZINE HYDROCHLORIDE 25 MG/1
25 TABLET ORAL AT BEDTIME
Qty: 60 TABLET | Refills: 3 | Status: SHIPPED | OUTPATIENT
Start: 2020-07-10 | End: 2020-08-05

## 2020-07-23 DIAGNOSIS — E13.9 POST-PANCREATECTOMY DIABETES (H): Primary | ICD-10-CM

## 2020-07-23 DIAGNOSIS — E89.1 POST-PANCREATECTOMY DIABETES (H): Primary | ICD-10-CM

## 2020-07-23 DIAGNOSIS — Z90.410 POST-PANCREATECTOMY DIABETES (H): Primary | ICD-10-CM

## 2020-07-28 DIAGNOSIS — E13.9 POST-PANCREATECTOMY DIABETES (H): ICD-10-CM

## 2020-07-28 DIAGNOSIS — E89.1 POST-PANCREATECTOMY DIABETES (H): ICD-10-CM

## 2020-07-28 DIAGNOSIS — Z90.410 POST-PANCREATECTOMY DIABETES (H): ICD-10-CM

## 2020-07-30 DIAGNOSIS — E13.9 POST-PANCREATECTOMY DIABETES (H): ICD-10-CM

## 2020-07-30 DIAGNOSIS — Z90.410 POST-PANCREATECTOMY DIABETES (H): ICD-10-CM

## 2020-07-30 DIAGNOSIS — D89.84 IGG4-RELATED SCLEROSING DISEASE (H): ICD-10-CM

## 2020-07-30 DIAGNOSIS — E89.1 POST-PANCREATECTOMY DIABETES (H): ICD-10-CM

## 2020-07-30 LAB
ALBUMIN SERPL-MCNC: 3.9 G/DL (ref 3.4–5)
ALP SERPL-CCNC: 100 U/L (ref 40–150)
ALT SERPL W P-5'-P-CCNC: 45 U/L (ref 0–50)
AST SERPL W P-5'-P-CCNC: 28 U/L (ref 0–45)
BILIRUB DIRECT SERPL-MCNC: 0.2 MG/DL (ref 0–0.2)
BILIRUB SERPL-MCNC: 0.9 MG/DL (ref 0.2–1.3)
HBA1C MFR BLD: 5.8 % (ref 0–5.6)
PROT SERPL-MCNC: 7.7 G/DL (ref 6.8–8.8)

## 2020-07-30 NOTE — LETTER
August 4, 2020      Dinora Mcghee  [address] [facility] [address]        Dear ,    We are writing to inform you of your test results.    They are normal.    Resulted Orders   Immunoglobulin G subclasses   Result Value Ref Range     610 - 1,616 mg/dL    IgG1 399 382 - 929 mg/dL    IgG2 338 242 - 700 mg/dL    IgG3 65 22 - 176 mg/dL    IgG4 85 4 - 86 mg/dL   Hepatic panel   Result Value Ref Range    Bilirubin Direct 0.2 0.0 - 0.2 mg/dL    Bilirubin Total 0.9 0.2 - 1.3 mg/dL    Albumin 3.9 3.4 - 5.0 g/dL    Protein Total 7.7 6.8 - 8.8 g/dL    Alkaline Phosphatase 100 40 - 150 U/L    ALT 45 0 - 50 U/L    AST 28 0 - 45 U/L       If you have any questions or concerns, please call the clinic at the number listed above.       Sincerely,        Anita Becerra MD

## 2020-07-31 LAB
IGG SERPL-MCNC: 956 MG/DL (ref 610–1616)
IGG1 SER-MCNC: 399 MG/DL (ref 382–929)
IGG2 SER-MCNC: 338 MG/DL (ref 242–700)
IGG3 SER-MCNC: 65 MG/DL (ref 22–176)
IGG4 SER-MCNC: 85 MG/DL (ref 4–86)

## 2020-08-05 ENCOUNTER — VIRTUAL VISIT (OUTPATIENT)
Dept: GASTROENTEROLOGY | Facility: CLINIC | Age: 55
End: 2020-08-05
Payer: COMMERCIAL

## 2020-08-05 DIAGNOSIS — K59.09 OTHER CONSTIPATION: Primary | ICD-10-CM

## 2020-08-05 DIAGNOSIS — Z90.410 HISTORY OF PANCREATECTOMY: ICD-10-CM

## 2020-08-05 DIAGNOSIS — R11.0 NAUSEA: ICD-10-CM

## 2020-08-05 DIAGNOSIS — K86.89 PANCREATIC INSUFFICIENCY: ICD-10-CM

## 2020-08-05 DIAGNOSIS — K21.9 GASTROESOPHAGEAL REFLUX DISEASE WITHOUT ESOPHAGITIS: ICD-10-CM

## 2020-08-05 DIAGNOSIS — R53.83 FATIGUE, UNSPECIFIED TYPE: ICD-10-CM

## 2020-08-05 RX ORDER — PROMETHAZINE HYDROCHLORIDE 25 MG/1
25 TABLET ORAL AT BEDTIME
Qty: 90 TABLET | Refills: 3 | Status: SHIPPED | OUTPATIENT
Start: 2020-08-05 | End: 2021-09-03

## 2020-08-05 RX ORDER — OMEPRAZOLE 40 MG/1
40 CAPSULE, DELAYED RELEASE ORAL AT BEDTIME
Qty: 90 CAPSULE | Refills: 3 | Status: SHIPPED | OUTPATIENT
Start: 2020-08-05 | End: 2020-09-08 | Stop reason: DRUGHIGH

## 2020-08-05 RX ORDER — FAMOTIDINE 20 MG/1
20 TABLET, FILM COATED ORAL 2 TIMES DAILY
Qty: 90 TABLET | Refills: 3 | Status: SHIPPED | OUTPATIENT
Start: 2020-08-05 | End: 2020-11-25

## 2020-08-05 RX ORDER — LUBIPROSTONE 8 UG/1
CAPSULE, GELATIN COATED ORAL
Qty: 150 CAPSULE | Refills: 11 | Status: SHIPPED | OUTPATIENT
Start: 2020-08-05 | End: 2021-06-16 | Stop reason: ALTCHOICE

## 2020-08-05 RX ORDER — PROCHLORPERAZINE MALEATE 5 MG
TABLET ORAL
Qty: 90 TABLET | Refills: 3 | Status: SHIPPED | OUTPATIENT
Start: 2020-08-05 | End: 2021-09-03

## 2020-08-05 ASSESSMENT — PAIN SCALES - GENERAL: PAINLEVEL: NO PAIN (0)

## 2020-08-05 NOTE — LETTER
8/5/2020         RE: Dinora Mcghee  [address] [facility] [address]        Dear Colleague,    Thank you for referring your patient, Dinora Mcghee, to the [facility]. Please see a copy of my visit note below.    Dinora Mcghee is a 55 year old female who is being evaluated via a billable video visit.        Video-Visit Details    Type of service:  Video Visit    Video Start Time: 8:08  Video End Time: 8:37    Originating Location (pt. 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     1.  Constipation, nausea, vomiting,   Patient reports she has not had symptoms of constipation and will have regular soft bowel movements.  May represent continued constipation with over-flow diarrhea, food insensitivity, or IBS. She continues to take Amitiza 5 tablets a day, magnesium oxide 200 mg, in addition to Creon (his discontinued senna)-- was recommended to continue this bowel regimen at this time.Can also try OTC IbGard for abdominal discomfort/cramping.     Has gone a couple of days without a bowel movement as she ran out of Amitiza- for constipation, can increase magnesium oxide or do partial clean-out with magnesium citrate.     Upper GI symptoms of nausea most recently well controlled with Phenergan at night, and  Compazine as needed. Patient should take anti-nausea medications sparingly due to increased risk of constipation. GERD worsened off zantac, can start pepcid and Prilosec 40 mg daily before at night.     Will also check iron studies per patient request given increased fatigue.     -- track symptoms and possible triggers   -- magnesium oxide 200 mg daily   -- continue Amitiza, colace as you have been   -- continue Prilosec 40 mg daily 30-60 minutes before eating  -- start pepcid 20 mg in the morning, let us know how this goes  -- continue phenergan at night as needed for nausea and prn compazine  -- let us know if your symptoms change or worsen    RTC 9-12 months     Thank you for this consultation. It was a pleasure to participate in the care of this patient; please contact us with any further questions. I spent a total of 29 minutes face-to-face (video) with Dinora Mcghee during today's office visit.  Over 50% of which was counseling/coordinating care regarding the above delineated issues. Please see note for details.       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     Summarized from previous documentation with Dr. Genao and I, please see previous notes for further details     Gastroparesis  Gastric emptying study with 2-hour study at [facility] have been consistent with gastroparesis, patient had follow-up study here 6/27/2016 with 49% remaining at 4 hours however the patient was not aware she was on narcotics at the time.   Patient had previous GJ tubes in fall 2016, NG tube used for venting with tube feeds and was able to wean off of tube feeds after TPA T.  At initial visit she reported severe migraine headaches associated with vomiting and is on amitriptyline for migraine prophylaxis.  Patient has used several medication for the symptoms including scopolamine patch, pro-chloro para Rufino, promethazine which helped symptoms.  She has also been seen by neurology and psychology due to concern that she has a difficult time sleeping and with migraines.  At her last visit, patient had been working with dietition to increase caloric intake and was maintaining weight around 150 lbs. She reported occasional nausea without vomiting, with 1 episode in the last month. Symptoms may have been worsened due to anxiety regarding surgery and ativan had helped.  At the patient's last visit:  the patient reports that since taking the Phenergan at night, her nausea and vomiting has improved significantly and she is not waking up as often.  She does report an episode around the holidays in which he vomited 5-6 times however thinks this may be due to a GI bug.  Now she reports that she has nausea about 2 times a week depending on the week.  Is also taking Compazine as needed and no longer taking Zofran.  She also understands that blood sugar may play a role in her nausea therefore she will check her blood sugar when nauseous.    Last year: The patient notes improvement in her symptoms if she waits to eat breakfast until 10-11 in the morning. She continues to take compazine in the morning, and phenergan at night. Still will experience vomiting, especially if she does not take phenergan at night. Nausea/vomiting worsens with migraines which have improved while avoiding gluten and increasing fruits and vegetables (2 days a month instead of 4 times a week). Also has cut back on meat and aims to eat more chicken and fish.     Constipation/irregular bowel  "movements/bloating  Patient reports history of ongoing constipation for over 20 years after having her first child.  She has tried multiple interventions including a bowel cleanout, MiraLAX, senna, milk of magnesium, Linzess, and Amitiza for which she was on when she first came to [facility] and  GI clinic.  Patient also takes creon.  Patient has done a course of rifaximin in the past with with improvement in bloating and stool consistency.  Patient has also been on specific SIBO/FODMAP diets to the [facility] while avoiding certain food triggers. At the patient's last visit: the patient notes that she continues to have occasional watery stools which she describes occurs on \"clearing days \".  This will happen every couple of days in which she describes urgent bowel movements with a large amount of stool requiring multiple trips to the bathroom.  When this occurs, she feels as though it has drained her energy.  Will also have abdominal discomfort associated with this which is slightly alleviated with a heating pad.  On other days, she will have a more solid bowel movement which she describes as a coil.  She plans to reschedule around having a bowel movement and her daily routine that involves exercise, drinking hot water, and guided imagery in order to have 1-2 satisfying bowel movements.  Continues to follow low FODMAP diet.  She continues to take amities of 5 tablets a day, occasional senna, and daily Colace. On average, the patient will take 12-15 creon tablets a day.     Last year she reports she typically has 2 bowel movements a day and denies significant constipation. Has cut out Senna, and is taking colace, Amitiza (5 tablets a day), magnesium oxide 200 mg. Is also taking Creon 12-15 tablets daily. Will have 1 episode of diarrhea a week which is stable from before- when this happens she will have very loose stools and urgency. Also notes increased bloating when consuming gluten.     GERD, Dysphagia "   Summarized/taken from prior documentation   Patient experiences GERD as substernal and throat burning with nocturnal relaxant causing choking. She had previously followed with Dr. George Flores in out clinic and reports a prior Schatzki's ring requiring previous dilation. Manometry was noral, and pH impedence had a DeMeester of 24 with positive symptoms association. Patient has treated GERD symptoms with BID PPI, Carafate, H2 blocker, and tums. At her last visit, she had been experiencing heartburn and has restarted omeprazole 40 mg daily with continued symptoms with specific food triggers. At the patient's last visit, she was instructed to start zantac 300 mg a day.     Last year she notes that since adding zantac, GERD has been well controlled. Is also taking daily omeprazole. Sometimes will note a deep/hoarse voice, this happens in the morning.     Interval History 8/5/2020:  Bowel movements:   When covid started, she was worried she had a bowel obstruction. It was severe severe constipation.   Was able to manage at home   Things had been going really well.  Ran out of medications      Continues to have nausea in the morning   Will need compazine in the morning   Will eat at 10 am   If she exercises she will get super nauseated   1/2 an hour before  Has gone about a week without phenergan at night    -has tried to get off this, nause awill get back     GERD  - worse since stopping zantac over the counter   Was told to try omeprazole in the morning- this did not help     With Creon  Has had rare greasy stools  Consistent, soft, easy to pass  Occasional muscle spasm where tube site     Diet:  Staying away from cabbage  FODMAP diet- 9 types of fruits or vegetables   Spring mix, red leaf  Eats red meat twice a week, fish, chicken or turkey   Kale is tough on gut     ROS:    Please see bottom of note     PROBLEM LIST  Patient Active Problem List    Diagnosis Date Noted     Iron deficiency anemia 03/22/2019      Priority: Medium     Headache, chronic migraine without aura 09/18/2018     Priority: Medium     Chronic pain syndrome 09/18/2018     Priority: Medium     S/P hernia repair 08/23/2018     Priority: Medium     Incisional hernia, without obstruction or gangrene 05/20/2018     Priority: Medium     Adhesive capsulitis of shoulder, unspecified laterality 11/14/2017     Priority: Medium     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  Document previously scanned on 1/12/17.  Validation form completed and scanned.  Code Status reflects choices in most recent ACP document..  Confirmed/documented designated decision maker(s).  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 Provider to review and confirm.       GERD (gastroesophageal reflux disease) 12/01/2010     Priority: Medium     Lumbago 04/18/2005     Priority: Medium     History of L5-S1 degenerative disk disease.         Sprain and strain of other specified sites of hip and thigh 12/09/2002     Priority: Medium     Chondromalacia of patella 12/09/2002     Priority: Medium     Need for prophylactic immunotherapy      Priority: Medium     trees, grass, srw, dust mites, cat       Allergic rhinitis due to other allergen 12/21/2001     Priority: Medium     Hypothyroidism      Priority: Medium     Problem list name  updated by automated process. Provider to review       Sensorineural hearing loss 01/10/2005     Priority: Medium     Problem list name updated by automated process. Provider to review       Vertiginous syndrome and labyrinthine disorder 01/10/2005     Priority: Medium     Problem list name updated by automated process. Provider to review         PERTINENT PAST MEDICAL HISTORY:  Past Medical History:   Diagnosis Date     Allergic rhinitis, cause unspecified      Allergy to other foods     strawberries, apples, celeries, alice, watermelon     Arthritis     left knee     Choledocholithiasis     long after cholecystectomy     Chronic abdominal pain      Chronic constipation      Chronic nausea      Chronic pancreatitis (H)      Degeneration of lumbar or lumbosacral intervertebral disc      Esophageal reflux     w/ hiatal hernia     Gastroparesis      Hiatal hernia      History of pituitary adenoma     s/p resection     Hypothyroidism      Migraines      Mild hyperlipidemia      On tube feeding diet     presence of GJ tube     Pancreatic disease     PD stricture, 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 endometriosis growth     APPENDECTOMY       C  DELIVERY ONLY       C  DELIVERY ONLY      repeat c section with incidental cystotomy with repair     C EXCIS PITUITARY,TRANSNASAL/SEPTAL      pituitary tumor removed for diabetes insipidus     C TOTAL ABDOM HYSTERECTOMY      w/ bilateral salpingoophorectomy       SECTION       COLONOSCOPY       ENDOSCOPIC RETROGRADE CHOLANGIOPANCREATOGRAM N/A 2015    Procedure: ENDOSCOPIC RETROGRADE CHOLANGIOPANCREATOGRAM;  Surgeon: Mandeep Park MD;  Location:  OR      ENDOSCOPIC RETROGRADE CHOLANGIOPANCREATOGRAM N/A 2/9/2016    Procedure: COMBINED ENDOSCOPIC RETROGRADE CHOLANGIOPANCREATOGRAPHY, PLACE TUBE/STENT;  Surgeon: Mandeep Park MD;  [facility]     ENDOSCOPIC RETROGRADE CHOLANGIOPANCREATOGRAM N/A 3/17/2016    Procedure: COMBINED ENDOSCOPIC RETROGRADE CHOLANGIOPANCREATOGRAPHY, REMOVE FOREIGN BODY OR STENT/TUBE;  Surgeon: Mandeep Park MD;  Location: [facility]     ENDOSCOPIC RETROGRADE CHOLANGIOPANCREATOGRAM N/A 8/2/2016    Procedure: COMBINED ENDOSCOPIC RETROGRADE CHOLANGIOPANCREATOGRAPHY, PLACE TUBE/STENT;  Surgeon: Mandeep Park MD;  [facility]: [facility]     ENDOSCOPIC RETROGRADE CHOLANGIOPANCREATOGRAM N/A 8/26/2016    Procedure: COMBINED ENDOSCOPIC RETROGRADE CHOLANGIOPANCREATOGRAPHY, REMOVE FOREIGN BODY OR STENT/TUBE;  Surgeon: Mandeep Park MD;  [facility]     ENDOSCOPIC ULTRASOUND UPPER GASTROINTESTINAL TRACT (GI) N/A 10/3/2016    Procedure: ENDOSCOPIC ULTRASOUND, ESOPHAGOSCOPY / UPPER GASTROINTESTINAL TRACT (GI);  Surgeon: Guru Jose Rodas MD;  Location: [facility] OR     ESOPHAGOSCOPY, GASTROSCOPY, DUODENOSCOPY (EGD), COMBINED N/A 6/24/2015    Procedure: COMBINED ESOPHAGOSCOPY, GASTROSCOPY, DUODENOSCOPY (EGD), REMOVE FOREIGN BODY;  Surgeon: Mandeep Park MD;  [facility] GI     ESOPHAGOSCOPY, GASTROSCOPY, DUODENOSCOPY (EGD), COMBINED N/A 10/25/2015    Procedure: COMBINED ESOPHAGOSCOPY, GASTROSCOPY, DUODENOSCOPY (EGD);  Surgeon: Sammy Amaro MD;  [facility]     ESOPHAGOSCOPY, GASTROSCOPY, DUODENOSCOPY (EGD), COMBINED N/A 10/25/2015    Procedure: COMBINED ESOPHAGOSCOPY, GASTROSCOPY, DUODENOSCOPY (EGD), BIOPSY SINGLE OR MULTIPLE;  Surgeon: Sammy Amaro MD;  [facility]: [facility] GI     ESOPHAGOSCOPY, GASTROSCOPY, DUODENOSCOPY (EGD), DILATATION, COMBINED       EXCISE LESION TRUNK N/A 4/17/2017    Procedure: EXCISE LESION TRUNK;  Removal of Abdominal Foreign Body;  Surgeon: Nestor Phoenix MD;   [facility]     HC ESOPH/GAS REFLUX TEST W NASAL IMPED >1 HR N/A 11/19/2015    Procedure: ESOPHAGEAL IMPEDENCE FUNCTION TEST WITH 24 HOUR PH GREATER THAN 1 HOUR;  Surgeon: Thiago Apple MD;  Location: [facility] GI     HC UGI ENDOSCOPY DIAG W BIOPSY  9/17/08     HC UGI ENDOSCOPY DIAG W BIOPSY  9/27/12     HC UGI ENDOSCOPY W ESOPHAGEAL DILATION BALLOON <30MM  9/17/08     HC UGI ENDOSCOPY W EUS N/A 5/5/2015    Procedure: COMBINED ENDOSCOPIC ULTRASOUND, ESOPHAGOSCOPY, GASTROSCOPY, DUODENOSCOPY (EGD);  Surgeon: Wm Dueñas MD;  Location: [facility] GI     HC WRIST ARTHROSCOP,RELEASE XVERS LIG Bilateral 12/17/08     INJECT TRANSVERSUS ABDOMINIS PLANE (TAP) BLOCK BILATERAL Left 9/22/2016    Procedure: INJECT TRANSVERSUS ABDOMINIS PLANE (TAP) BLOCK BILATERAL;  Surgeon: Dickson Corrigan MD;  [facility] OR     laparoscopic pineda  1995     LAPAROSCOPIC HERNIORRHAPHY INCISIONAL N/A 8/23/2018    Procedure: LAPAROSCOPIC HERNIORRHAPHY INCISIONAL;  Laparoscopic Incisional Hernia Repair with Symbotex Mesh Implant;  Surgeon: Nestor Phoenix MD;  [facility]     LAPAROSCOPIC PANCREATECTOMY, TRANSPLANT AUTO ISLET CELL N/A 12/28/2016    Procedure: LAPAROSCOPIC PANCREATECTOMY, TRANSPLANT AUTO ISLET CELL;  Surgeon: Nestor Phoenix MD;  [facility]: [facility] OR     transphenoidal pituitary resection  1990       ALLERGIES:  Allergies   Allergen Reactions     Apple Anaphylaxis     Corticosteroids Other (See Comments)     All oral, IV and injectable steroids are contraindicated (unless in life threatening situations) in [facility] transplant recipients. They can cause irreversible loss of islet cell function. Please contact patient's transplant care coordinator ADI Gaffney RN at [phone]/pager [phone] and/or endocrinologist prior to administration.       Depakote [Divalproex Sodium] Other (See Comments)     Chest pain     Zithromax [Azithromycin Dihydrate] Anaphylaxis     Noted in 4/7/08 ER     Darvocet [Propoxyphene N-Apap]  Itching     Morphine Nausea and Vomiting and Rash     Nalbuphine Hcl Rash     RASH :nubaine     Zosyn [Piperacillin-Tazobactam In D5w] Rash     Possible allergy, did have a diffuse rash that seemed drug related but could have also been related to soap in the hospital.      Bactrim [Sulfamethoxazole W-Trimethoprim] Other (See Comments) and Nausea and Vomiting     Severely low liver function.     Cats      Compazine [Prochlorperazine] Other (See Comments)     Twitching. Takes Benedryl and is fine     Dust Mites      Grass      Prednisone Other (See Comments)     Insomnia       Ragweeds      Tape [Adhesive Tape] Blisters     Tramadol      Trees      Zofran [Ondansetron] Other (See Comments)     migraine     Flagyl [Metronidazole] Hives and Rash       PERTINENT MEDICATIONS:    Current Outpatient Medications:      acetaminophen 500 MG CAPS, Take 2 mg by mouth 2 times daily, Disp: , Rfl:      amitriptyline (ELAVIL) 10 MG tablet, Take 4 tablets (40 mg) by mouth At Bedtime Take 40 mg at bedtime, Disp: 120 tablet, Rfl: 11     amylase-lipase-protease (CREON 24) 65225-25863 units CPEP per EC capsule, Take 3-4 capsules by mouth with meals and 1-2 with snacks. Maximum 15 capsules per day., Disp: 450 capsule, Rfl: 6     aspirin 81 MG EC tablet, Take 1 tablet (81 mg) by mouth daily, Disp: 90 tablet, Rfl: 3     azelastine (ASTELIN) 0.1 % nasal spray, Spray 1 spray into both nostrils 2 times daily, Disp: 1 Bottle, Rfl: 11     blood glucose (PAT CONTOUR NEXT) test strip, Use to test blood sugar 6 times daily or as directed., Disp: 2 Box, Rfl: 11     blood glucose monitoring (PAT MICROLET) lancets, Use to test blood sugar 6-8 times daily or as directed., Disp: 100 each, Rfl: 11     calcium carbonate-vitamin D (CALCIUM 500/D) 500-200 MG-UNIT tablet, , Disp: , Rfl:      cetirizine (ZYRTEC) 10 MG tablet, Take 10 mg by mouth daily, Disp: , Rfl:      diphenhydrAMINE (BENADRYL ALLERGY) 25 MG capsule, Take 1 capsule (25 mg) by mouth  every 6 hours as needed for itching or allergies, Disp: 56 capsule, Rfl: 0     docusate sodium (COLACE) 100 MG capsule, Take 1 capsule (100 mg) by mouth 2 times daily as needed for constipation,, Disp: 60 capsule, Rfl: 0     estradiol (VAGIFEM) 10 MCG TABS vaginal tablet, Place 1 tablet (10 mcg) vaginally twice a week, Disp: 24 tablet, Rfl: 3     fish oil-omega-3 fatty acids 1000 MG capsule, , Disp: , Rfl:      FOLIC ACID PO, Take 1 mg by mouth daily, Disp: , Rfl:      glucose 40 % GEL gel, Take 15-30 g by mouth every 15 minutes as needed for low blood sugar, Disp: 3 Tube, Rfl: 2     ibuprofen (ADVIL/MOTRIN) 400 MG tablet, Take 1 tablet (400 mg) by mouth every 6 hours as needed for moderate pain, Disp: 30 tablet, Rfl: 0     levothyroxine (SYNTHROID) 137 MCG tablet, Take 1 tablet (137 mcg) by mouth daily, Disp: 90 tablet, Rfl: 3     lubiprostone (AMITIZA) 8 MCG capsule, Take 3 capsules in AM and 2 capsules in PM  More refills after virtual visit with Ms Marti, Disp: 150 capsule, Rfl: 0     magnesium oxide 200 MG TABS, , Disp: , Rfl:      montelukast (SINGULAIR) 10 MG tablet, TAKE 1 TABLET(10 MG) BY MOUTH AT BEDTIME, Disp: 90 tablet, Rfl: 3     multivitamin CF formula (CHOICEFUL) capsule, Take 1 tablet by mouth daily, Disp: , Rfl: 11     Nutritional Supplements (BOOST HIGH PROTEIN) LIQD, After above baseline labs are drawn, give: 6 mL/kg to maximum of 360 mL; the beverage is to be consumed within 5 minutes., Disp: , Rfl: 0     omeprazole (PRILOSEC) 40 MG DR capsule, Take 1 capsule (40 mg) by mouth daily, Disp: 90 capsule, Rfl: 3     omeprazole (PRILOSEC) 40 MG DR capsule, Take 1 capsule (40 mg) by mouth At Bedtime, Disp: 90 capsule, Rfl: 3     order for DME, Equipment being ordered:Cefaly, Disp: 1 Device, Rfl: 0     prochlorperazine (COMPAZINE) 5 MG tablet, Take two 5 mg tablets by mouth every six hours as needed for nausea., Disp: 90 tablet, Rfl: 0     promethazine (PHENERGAN) 25 MG tablet, Take 1 tablet (25 mg) by  mouth At Bedtime, Disp: 60 tablet, Rfl: 3     senna-docusate (SENOKOT-S;PERICOLACE) 8.6-50 MG per tablet, Take 1-2 tablets by mouth 2 times daily, Disp: 100 tablet, Rfl: 0     Sharps Container (BD SHARPS ) MISC, 1 Container as needed, Disp: 1 each, Rfl: 1     ZOLMitriptan (ZOMIG) 5 MG tablet, Take 1 tablet (5 mg) by mouth at onset of headache for migraine, Disp: 9 tablet, Rfl: 3    SOCIAL HISTORY:  Social History     Socioeconomic History     Marital status:      Spouse name: Norris     Number of children: 3     Years of education: 16     Highest education level: Not on file   Occupational History     Occupation: director     Employer: [employer]   Social Needs     Financial resource strain: Not on file     Food insecurity     Worry: Not on file     Inability: Not on file     Transportation needs     Medical: Not on file     Non-medical: Not on file   Tobacco Use     Smoking status: Former Smoker     Packs/day: 0.50     Years: 6.00     Pack years: 3.00     Types: Cigarettes     Start date: 1985     Last attempt to quit: 1992     Years since quittin.6     Smokeless tobacco: Never Used     Tobacco comment: no 2nd hand   Substance and Sexual Activity     Alcohol use: Yes     Alcohol/week: 3.0 - 6.0 standard drinks     Types: 1 - 2 Glasses of wine, 1 - 2 Cans of beer, 1 - 2 Shots of liquor per week     Comment: occasional     Drug use: No     Sexual activity: Yes     Partners: Male     Birth control/protection: None     Comment: Hystectomy 1999   Lifestyle     Physical activity     Days per week: Not on file     Minutes per session: Not on file     Stress: Not on file   Relationships     Social connections     Talks on phone: Not on file     Gets together: Not on file     Attends Muslim service: Not on file     Active member of club or organization: Not on file     Attends meetings of clubs or organizations: Not on file     Relationship status: Not on file     Intimate partner violence      Fear of current or ex partner: Not on file     Emotionally abused: Not on file     Physically abused: Not on file     Forced sexual activity: Not on file   Other Topics Concern     Parent/sibling w/ CABG, MI or angioplasty before 65F 55M? No      Service Not Asked     Blood Transfusions No     Caffeine Concern Not Asked     Occupational Exposure Not Asked     Hobby Hazards Not Asked     Sleep Concern Not Asked     Stress Concern Not Asked     Weight Concern Not Asked     Special Diet Not Asked     Back Care Not Asked     Exercise Not Asked     Bike Helmet Not Asked     Seat Belt Yes     Self-Exams Not Asked   Social History Narrative     with 3 children and a dog.  No smoking, etoh or drug use.  Worked as a  for [employer] in [employer] in the past.  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       FAMILY HISTORY:  Family History   Problem Relation Age of Onset     Eye Disorder Father         cataract, detached retina     Myocardial Infarction Father 60     Lipids Father      Cerebrovascular Disease Father      Depression Father      Substance Abuse Father      Anesthesia Reaction Father         stroke right after surgery     Cataracts Father      Osteoarthritis Father      Ulcerative Colitis Father      Lipids Mother      Hypertension Mother      Thyroid Disease Mother      Depression Mother      Angina Mother      GERD Mother      Skin Cancer Mother      Eye Disorder Son         ptosis     Eye Disorder Paternal Grandmother         cataract     Eye Disorder Paternal Grandfather         cataract     Diabetes Paternal Grandfather      Eye Disorder Maternal Grandmother         cataract     Thyroid Disease Maternal Grandmother      Coronary Artery Disease Sister         angioplasty     GERD Sister      Substance Abuse Sister      Depression Sister      Depression Son      Anxiety Disorder Son      Thyroid Disease Sister      Diabetes Maternal Grandfather       Depression Nephew      Anxiety Disorder Nephew      Thyroid Disease Nephew      Diabetes Type 2  Cousin         paternal cousin     Heart Disease Paternal Aunt      Diabetes Paternal Aunt      Diabetes Paternal Uncle      Heart Disease Paternal Uncle      Past/family/social history reviewed and no changes    PHYSICAL EXAMINATION:  General appearance: Healthy appearing adult, in no acute distress  Eyes: Sclera anicteric, Pupils round and reactive to light  Ears, nose, mouth and throat: No obvious external lesions of ears and nose, Hearing intact  Neck: Symmetric, No obvious external lesions  Respiratory: Normal respiration, no use of accessory muscles   Skin: No rashes or jaundice   Psychiatric: Oriented to person, place and time, Appropriate mood and affect.       PERTINENT STUDIES:  Orders Only on 09/13/2018   Component Date Value Ref Range Status     WBC 09/13/2018 5.8  4.0 - 11.0 10e9/L Final     RBC Count 09/13/2018 4.51  3.8 - 5.2 10e12/L Final     Hemoglobin 09/13/2018 14.1  11.7 - 15.7 g/dL Final     Hematocrit 09/13/2018 42.1  35.0 - 47.0 % Final     MCV 09/13/2018 93  78 - 100 fl Final     MCH 09/13/2018 31.3  26.5 - 33.0 pg Final     MCHC 09/13/2018 33.5  31.5 - 36.5 g/dL Final     RDW 09/13/2018 13.2  10.0 - 15.0 % Final     Platelet Count 09/13/2018 430  150 - 450 10e9/L Final     Diff Method 09/13/2018 Automated Method   Final     % Neutrophils 09/13/2018 37.1  % Final     % Lymphocytes 09/13/2018 40.5  % Final     % Monocytes 09/13/2018 10.5  % Final     % Eosinophils 09/13/2018 9.8  % Final     % Basophils 09/13/2018 2.1  % Final     % Immature Granulocytes 09/13/2018 0.0  % Final     Nucleated RBCs 09/13/2018 0  0 /100 Final     Absolute Neutrophil 09/13/2018 2.2  1.6 - 8.3 10e9/L Final     Absolute Lymphocytes 09/13/2018 2.4  0.8 - 5.3 10e9/L Final     Absolute Monocytes 09/13/2018 0.6  0.0 - 1.3 10e9/L Final     Absolute Eosinophils 09/13/2018 0.6  0.0 - 0.7 10e9/L Final     Absolute Basophils  09/13/2018 0.1  0.0 - 0.2 10e9/L Final     Abs Immature Granulocytes 09/13/2018 0.0  0 - 0.4 10e9/L Final     Absolute Nucleated RBC 09/13/2018 0.0   Final     Sodium 09/13/2018 138  133 - 144 mmol/L Final     Potassium 09/13/2018 4.3  3.4 - 5.3 mmol/L Final     Chloride 09/13/2018 102  94 - 109 mmol/L Final     Carbon Dioxide 09/13/2018 30  20 - 32 mmol/L Final     Anion Gap 09/13/2018 5  3 - 14 mmol/L Final     Glucose 09/13/2018 119* 70 - 99 mg/dL Final     Urea Nitrogen 09/13/2018 12  7 - 30 mg/dL Final     Creatinine 09/13/2018 0.83  0.52 - 1.04 mg/dL Final     GFR Estimate 09/13/2018 72  >60 mL/min/1.7m2 Final     GFR Estimate If Black 09/13/2018 87  >60 mL/min/1.7m2 Final     Calcium 09/13/2018 9.2  8.5 - 10.1 mg/dL Final     Bilirubin Direct 09/13/2018 0.2  0.0 - 0.2 mg/dL Final     Bilirubin Total 09/13/2018 0.8  0.2 - 1.3 mg/dL Final     Albumin 09/13/2018 3.9  3.4 - 5.0 g/dL Final     Protein Total 09/13/2018 7.8  6.8 - 8.8 g/dL Final     Alkaline Phosphatase 09/13/2018 145  40 - 150 U/L Final     ALT 09/13/2018 59* 0 - 50 U/L Final     AST 09/13/2018 39  0 - 45 U/L Final     IGG 09/13/2018 1160  695 - 1620 mg/dL Final     IgG1 09/13/2018 416  300 - 856 mg/dL Final     IgG2 09/13/2018 384  158 - 761 mg/dL Final     IgG3 09/13/2018 83  24 - 192 mg/dL Final     IgG4 09/13/2018 95* 11 - 86 mg/dL Final       Again, thank you for allowing me to participate in the care of your patient.        Sincerely,        Rosanne Marti PA-C

## 2020-08-05 NOTE — NURSING NOTE
Chief Complaint   Patient presents with     RECHECK     follow up 1 year gerd       There were no vitals filed for this visit.    There is no height or weight on file to calculate BMI.    Saundra Jaquez CMA

## 2020-08-05 NOTE — PATIENT INSTRUCTIONS
It was a pleasure taking care of you today.  I've included a brief summary of our discussion and care plan from today's visit below.  Please review this information with your primary care provider.  ______________________________________________________________________    My recommendations are summarized as follows:    -- ok to temporarily increase magnesium to 1000 mg a day, or do magnesium citrate cleanout. Can also take as needed senna until you get your refill    -- labs when able. The order for this is in, can be completed at our lab. To schedule lab appointment here, use my chart or call [phone].     -- track symptoms and possible triggers   -- magnesium oxide 200 mg daily   -- continue Amitiza, colace as you have been   -- continue Prilosec 40 mg daily 30-60 minutes before eating  -- start pepcid 20 mg in the morning, let us know how this goes  -- continue phenergan at night as needed for nausea and prn compazine  -- let us know if your symptoms change or worsen    Return to GI Clinic in 9-12 months to review your progress.    ______________________________________________________________________    Who do I call with any questions after my visit?  Please be in touch if there are any further questions that arise following today's visit.  There are multiple ways to contact your gastroenterology care team.        During business hours, you may reach a Gastroenterology nurse at [phone]      To schedule or reschedule an appointment, please call [phone].       You can always send a secure message through [employer].  [employer] messages are answered by your nurse or doctor typically within 24 hours.  Please allow extra time on weekends and holidays.        For urgent/emergent questions after business hours, you may reach the on-call GI Fellow by contacting the [facility] at [phone].     How will I get the results of any tests ordered?    You will receive all of your results.  If  you have signed up for [employer], any tests ordered at your visit will be available to you after your physician reviews them.  Typically this takes 1-2 weeks.  If there are urgent results that require a change in your care plan, your physician or nurse will call you to discuss the next steps.      What is [employer]?  [employer] is a secure way for you to access all of your healthcare records from the [facility].  It is a web based computer program, so you can sign on to it from any location.  It also allows you to send secure messages to your care team.  I recommend signing up for [employer] access if you have not already done so and are comfortable with using a computer.      How to I schedule a follow-up visit?  If you did not schedule a follow-up visit today, please call [phone] to schedule a follow-up office visit.        Sincerely,    Rosanne Marti PA-C  Division of Gastroenterology, Hepatology & Nutrition  [facility]

## 2020-08-05 NOTE — PROGRESS NOTES
"Dinora Mcghee is a 55 year old female who is being evaluated via a billable video visit.      The patient has been notified of following:     \"This video visit will be conducted via a call between you and your physician/provider. We have found that certain health care needs can be provided without the need for an in-person physical exam.  This service lets us provide the care you need with a video conversation.  If a prescription is necessary we can send it directly to your pharmacy.  If lab work is needed we can place an order for that and you can then stop by our lab to have the test done at a later time.    Video visits are billed at different rates depending on your insurance coverage.  Please reach out to your insurance provider with any questions.    If during the course of the call the physician/provider feels a video visit is not appropriate, you will not be charged for this service.\"    Patient has given verbal consent for Video visit? Yes  How would you like to obtain your AVS? MyChart    Will anyone else be joining your video visit? No        Video-Visit Details    Type of service:  Video Visit    Video Start Time: 8:08  Video End Time: 8:37    Originating Location (pt. 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     1.  Constipation, nausea, " vomiting,   Patient reports she has not had symptoms of constipation and will have regular soft bowel movements.  May represent continued constipation with over-flow diarrhea, food insensitivity, or IBS. She continues to take Amitiza 5 tablets a day, magnesium oxide 200 mg, in addition to Creon (his discontinued senna)-- was recommended to continue this bowel regimen at this time.Can also try OTC IbGard for abdominal discomfort/cramping.     Has gone a couple of days without a bowel movement as she ran out of Amitiza- for constipation, can increase magnesium oxide or do partial clean-out with magnesium citrate.     Upper GI symptoms of nausea most recently well controlled with Phenergan at night, and Compazine as needed. Patient should take anti-nausea medications sparingly due to increased risk of constipation. GERD worsened off zantac, can start pepcid and Prilosec 40 mg daily before at night.     Will also check iron studies per patient request given increased fatigue.     -- track symptoms and possible triggers   -- magnesium oxide 200 mg daily   -- continue Amitiza, colace as you have been   -- continue Prilosec 40 mg daily 30-60 minutes before eating  -- start pepcid 20 mg in the morning, let us know how this goes  -- continue phenergan at night as needed for nausea and prn compazine  -- let us know if your symptoms change or worsen    RTC 9-12 months     Thank you for this consultation. It was a pleasure to participate in the care of this patient; please contact us with any further questions. I spent a total of 29 minutes face-to-face (video) with Dinora Mcghee during today's office visit.  Over 50% of which was counseling/coordinating care regarding the above delineated issues. Please see note for details.       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     Summarized from previous documentation with Dr. Genao and I, please see previous notes for further details     Gastroparesis  Gastric emptying study with 2-hour study at [facility] have been consistent with gastroparesis, patient had follow-up study here 6/27/2016 with 49% remaining at 4 hours however the patient was not aware she was on narcotics at the time.  Patient had previous GJ tubes in fall 2016, NG tube used for venting with tube feeds and was able to wean off of tube feeds after TPA T.  At initial visit she reported severe migraine headaches associated with vomiting and is on amitriptyline for migraine prophylaxis.  Patient has used several medication for the symptoms including scopolamine patch, pro-chloro para Rufino, promethazine which helped symptoms.  She has also been seen by neurology and psychology due to concern that she has a difficult time sleeping and with migraines.  At her last visit, patient had been working with dietition to increase caloric intake and was maintaining weight around 150 lbs. She reported occasional nausea without vomiting, with 1 episode in the last month. Symptoms may have been worsened due to anxiety regarding surgery and ativan had helped.  At the patient's last visit:  the patient reports that since taking the Phenergan at night, her nausea and vomiting has improved significantly and she is not waking up as often.  She does report an episode around the holidays in which he vomited 5-6 times however thinks this may be due to a GI bug.  Now she reports that she has nausea about 2 times a week depending on the week.  Is also taking  "Compazine as needed and no longer taking Zofran.  She also understands that blood sugar may play a role in her nausea therefore she will check her blood sugar when nauseous.    Last year: The patient notes improvement in her symptoms if she waits to eat breakfast until 10-11 in the morning. She continues to take compazine in the morning, and phenergan at night. Still will experience vomiting, especially if she does not take phenergan at night. Nausea/vomiting worsens with migraines which have improved while avoiding gluten and increasing fruits and vegetables (2 days a month instead of 4 times a week). Also has cut back on meat and aims to eat more chicken and fish.     Constipation/irregular bowel movements/bloating  Patient reports history of ongoing constipation for over 20 years after having her first child.  She has tried multiple interventions including a bowel cleanout, MiraLAX, senna, milk of magnesium, Linzess, and Amitiza for which she was on when she first came to [facility] and  GI clinic.  Patient also takes creon.  Patient has done a course of rifaximin in the past with with improvement in bloating and stool consistency.  Patient has also been on specific SIBO/FODMAP diets to the [facility] while avoiding certain food triggers. At the patient's last visit: the patient notes that she continues to have occasional watery stools which she describes occurs on \"clearing days \".  This will happen every couple of days in which she describes urgent bowel movements with a large amount of stool requiring multiple trips to the bathroom.  When this occurs, she feels as though it has drained her energy.  Will also have abdominal discomfort associated with this which is slightly alleviated with a heating pad.  On other days, she will have a more solid bowel movement which she describes as a coil.  She plans to reschedule around having a bowel movement and her daily routine that involves exercise, drinking hot water, and " guided imagery in order to have 1-2 satisfying bowel movements.  Continues to follow low FODMAP diet.  She continues to take amities of 5 tablets a day, occasional senna, and daily Colace. On average, the patient will take 12-15 creon tablets a day.     Last year she reports she typically has 2 bowel movements a day and denies significant constipation. Has cut out Senna, and is taking colace, Amitiza (5 tablets a day), magnesium oxide 200 mg. Is also taking Creon 12-15 tablets daily. Will have 1 episode of diarrhea a week which is stable from before- when this happens she will have very loose stools and urgency. Also notes increased bloating when consuming gluten.     GERD, Dysphagia   Summarized/taken from prior documentation   Patient experiences GERD as substernal and throat burning with nocturnal relaxant causing choking. She had previously followed with Dr. George Flores in out clinic and reports a prior Schatzki's ring requiring previous dilation. Manometry was noral, and pH impedence had a DeMeester of 24 with positive symptoms association. Patient has treated GERD symptoms with BID PPI, Carafate, H2 blocker, and tums. At her last visit, she had been experiencing heartburn and has restarted omeprazole 40 mg daily with continued symptoms with specific food triggers. At the patient's last visit, she was instructed to start zantac 300 mg a day.     Last year she notes that since adding zantac, GERD has been well controlled. Is also taking daily omeprazole. Sometimes will note a deep/hoarse voice, this happens in the morning.     Interval History 8/5/2020:  Bowel movements:   When covid started, she was worried she had a bowel obstruction. It was severe severe constipation.   Was able to manage at home   Things had been going really well.  Ran out of medications      Continues to have nausea in the morning   Will need compazine in the morning   Will eat at 10 am   If she exercises she will get super nauseated    1/2 an hour before  Has gone about a week without phenergan at night    -has tried to get off this, nause awill get back     GERD  - worse since stopping zantac over the counter   Was told to try omeprazole in the morning- this did not help     With Creon  Has had rare greasy stools  Consistent, soft, easy to pass  Occasional muscle spasm where tube site     Diet:  Staying away from cabbage  FODMAP diet- 9 types of fruits or vegetables   Spring mix, red leaf  Eats red meat twice a week, fish, chicken or turkey   Kale is tough on gut     ROS:    Please see bottom of note     PROBLEM LIST  Patient Active Problem List    Diagnosis Date Noted     Iron deficiency anemia 03/22/2019     Priority: Medium     Headache, chronic migraine without aura 09/18/2018     Priority: Medium     Chronic pain syndrome 09/18/2018     Priority: Medium     S/P hernia repair 08/23/2018     Priority: Medium     Incisional hernia, without obstruction or gangrene 05/20/2018     Priority: Medium     Adhesive capsulitis of shoulder, unspecified laterality 11/14/2017     Priority: Medium     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  Document previously scanned on 1/12/17.  Validation form completed and scanned.  Code Status reflects choices in most recent ACP document..  Confirmed/documented designated decision maker(s).  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 Provider to review and confirm.       GERD (gastroesophageal reflux disease) 12/01/2010     Priority: Medium     Lumbago 04/18/2005     Priority: Medium     History of L5-S1 degenerative disk disease.         Sprain and strain of other specified sites of hip and thigh 12/09/2002     Priority: Medium     Chondromalacia of patella 12/09/2002     Priority: Medium     Need for prophylactic immunotherapy      Priority: Medium     trees, grass, srw, dust mites, cat       Allergic rhinitis due to other allergen 12/21/2001     Priority: Medium     Hypothyroidism      Priority: Medium     Problem list name updated by automated process. Provider to review       Sensorineural hearing loss 01/10/2005     Priority: Medium     Problem list name updated by automated process. Provider to review       Vertiginous syndrome and labyrinthine disorder 01/10/2005     Priority: Medium     Problem list name updated by automated process. Provider to review         PERTINENT PAST MEDICAL HISTORY:  Past Medical History:   Diagnosis Date     Allergic rhinitis, cause unspecified      Allergy to other foods     strawberries, apples, celeries, alice, watermelon     Arthritis     left knee     Choledocholithiasis     long after cholecystectomy     Chronic abdominal pain      Chronic constipation      Chronic nausea      Chronic pancreatitis (H)      Degeneration of lumbar or lumbosacral intervertebral disc      Esophageal reflux     w/ hiatal hernia     Gastroparesis      Hiatal hernia      History of pituitary adenoma     s/p resection     Hypothyroidism      Migraines      Mild hyperlipidemia      On tube feeding diet     presence of GJ tube     Pancreatic disease     PD stricture, 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 endometriosis growth     APPENDECTOMY       C  DELIVERY ONLY       C  DELIVERY ONLY      repeat c section with incidental cystotomy with repair     C EXCIS PITUITARY,TRANSNASAL/SEPTAL  1980    pituitary tumor removed for diabetes insipidus     C TOTAL ABDOM HYSTERECTOMY      w/ bilateral salpingoophorectomy       SECTION       COLONOSCOPY       ENDOSCOPIC RETROGRADE CHOLANGIOPANCREATOGRAM N/A 2015    Procedure: ENDOSCOPIC RETROGRADE CHOLANGIOPANCREATOGRAM;  Surgeon: Mandeep Park MD;  Location: [facility]     ENDOSCOPIC RETROGRADE CHOLANGIOPANCREATOGRAM N/A 2016    Procedure: COMBINED ENDOSCOPIC RETROGRADE CHOLANGIOPANCREATOGRAPHY, PLACE TUBE/STENT;  Surgeon: Mandeep Park MD;  [facility]: [facility]     ENDOSCOPIC RETROGRADE CHOLANGIOPANCREATOGRAM N/A 3/17/2016    Procedure: COMBINED ENDOSCOPIC RETROGRADE CHOLANGIOPANCREATOGRAPHY, REMOVE FOREIGN BODY OR STENT/TUBE;  Surgeon: Mandeep Park MD;  Location: [facility]     ENDOSCOPIC RETROGRADE CHOLANGIOPANCREATOGRAM N/A 2016    Procedure: COMBINED ENDOSCOPIC RETROGRADE CHOLANGIOPANCREATOGRAPHY, PLACE TUBE/STENT;  Surgeon: Mandeep Park MD;  [facility]     ENDOSCOPIC RETROGRADE CHOLANGIOPANCREATOGRAM N/A 2016    Procedure: COMBINED ENDOSCOPIC RETROGRADE CHOLANGIOPANCREATOGRAPHY, REMOVE FOREIGN BODY OR STENT/TUBE;  Surgeon: Mandeep Park MD;  Location: [facility]     ENDOSCOPIC ULTRASOUND UPPER GASTROINTESTINAL TRACT (GI) N/A 10/3/2016    Procedure: ENDOSCOPIC ULTRASOUND, ESOPHAGOSCOPY / UPPER GASTROINTESTINAL TRACT (GI);  Surgeon: Guru Jose Rodas MD;  Location: [facility]     ESOPHAGOSCOPY, GASTROSCOPY, DUODENOSCOPY (EGD), COMBINED N/A 2015    Procedure: COMBINED ESOPHAGOSCOPY, GASTROSCOPY, DUODENOSCOPY (EGD), REMOVE FOREIGN BODY;   Surgeon: Mandeep Park MD;  [facility]     ESOPHAGOSCOPY, GASTROSCOPY, DUODENOSCOPY (EGD), COMBINED N/A 10/25/2015    Procedure: COMBINED ESOPHAGOSCOPY, GASTROSCOPY, DUODENOSCOPY (EGD);  Surgeon: Sammy Amaro MD;  [facility]     ESOPHAGOSCOPY, GASTROSCOPY, DUODENOSCOPY (EGD), COMBINED N/A 10/25/2015    Procedure: COMBINED ESOPHAGOSCOPY, GASTROSCOPY, DUODENOSCOPY (EGD), BIOPSY SINGLE OR MULTIPLE;  Surgeon: Sammy Amaro MD;  Location: [facility]     ESOPHAGOSCOPY, GASTROSCOPY, DUODENOSCOPY (EGD), DILATATION, COMBINED       EXCISE LESION TRUNK N/A 4/17/2017    Procedure: EXCISE LESION TRUNK;  Removal of Abdominal Foreign Body;  Surgeon: Nestor Phoenix MD;  [facility]     HC ESOPH/GAS REFLUX TEST W NASAL IMPED >1 HR N/A 11/19/2015    Procedure: ESOPHAGEAL IMPEDENCE FUNCTION TEST WITH 24 HOUR PH GREATER THAN 1 HOUR;  Surgeon: Thiago Apple MD;  Location: [facility]     HC UGI ENDOSCOPY DIAG W BIOPSY  9/17/08     HC UGI ENDOSCOPY DIAG W BIOPSY  9/27/12     HC UGI ENDOSCOPY W ESOPHAGEAL DILATION BALLOON <30MM  9/17/08     HC UGI ENDOSCOPY W EUS N/A 5/5/2015    Procedure: COMBINED ENDOSCOPIC ULTRASOUND, ESOPHAGOSCOPY, GASTROSCOPY, DUODENOSCOPY (EGD);  Surgeon: Wm Dueñas MD;  Location: [facility]     HC WRIST ARTHROSCOP,RELEASE XVERS LIG Bilateral 12/17/08     INJECT TRANSVERSUS ABDOMINIS PLANE (TAP) BLOCK BILATERAL Left 9/22/2016    Procedure: INJECT TRANSVERSUS ABDOMINIS PLANE (TAP) BLOCK BILATERAL;  Surgeon: Dickson Corrigan MD;  [facility]: [facility]     laparoscopic pineda  1995     LAPAROSCOPIC HERNIORRHAPHY INCISIONAL N/A 8/23/2018    Procedure: LAPAROSCOPIC HERNIORRHAPHY INCISIONAL;  Laparoscopic Incisional Hernia Repair with Symbotex Mesh Implant;  Surgeon: Nestor Phoenix MD;  Location: [facility]     LAPAROSCOPIC PANCREATECTOMY, TRANSPLANT AUTO ISLET CELL N/A 12/28/2016    Procedure: LAPAROSCOPIC PANCREATECTOMY, TRANSPLANT AUTO ISLET CELL;  Surgeon: Nestor Phoenix MD;  Location:  [facility] OR     transphenoidal pituitary resection  1990       ALLERGIES:  Allergies   Allergen Reactions     Apple Anaphylaxis     Corticosteroids Other (See Comments)     All oral, IV and injectable steroids are contraindicated (unless in life threatening situations) in [facility] transplant recipients. They can cause irreversible loss of islet cell function. Please contact patient's transplant care coordinator ADI Gaffney RN at [phone]/pager [phone] and/or endocrinologist prior to administration.       Depakote [Divalproex Sodium] Other (See Comments)     Chest pain     Zithromax [Azithromycin Dihydrate] Anaphylaxis     Noted in 4/7/08 ER     Darvocet [Propoxyphene N-Apap] Itching     Morphine Nausea and Vomiting and Rash     Nalbuphine Hcl Rash     RASH :nubaine     Zosyn [Piperacillin-Tazobactam In D5w] Rash     Possible allergy, did have a diffuse rash that seemed drug related but could have also been related to soap in the hospital.      Bactrim [Sulfamethoxazole W-Trimethoprim] Other (See Comments) and Nausea and Vomiting     Severely low liver function.     Cats      Compazine [Prochlorperazine] Other (See Comments)     Twitching. Takes Benedryl and is fine     Dust Mites      Grass      Prednisone Other (See Comments)     Insomnia       Ragweeds      Tape [Adhesive Tape] Blisters     Tramadol      Trees      Zofran [Ondansetron] Other (See Comments)     migraine     Flagyl [Metronidazole] Hives and Rash       PERTINENT MEDICATIONS:    Current Outpatient Medications:      acetaminophen 500 MG CAPS, Take 2 mg by mouth 2 times daily, Disp: , Rfl:      amitriptyline (ELAVIL) 10 MG tablet, Take 4 tablets (40 mg) by mouth At Bedtime Take 40 mg at bedtime, Disp: 120 tablet, Rfl: 11     amylase-lipase-protease (CREON 24) 66547-31854 units CPEP per EC capsule, Take 3-4 capsules by mouth with meals and 1-2 with snacks. Maximum 15 capsules per day., Disp: 450 capsule, Rfl: 6     aspirin 81 MG EC tablet, Take 1  tablet (81 mg) by mouth daily, Disp: 90 tablet, Rfl: 3     azelastine (ASTELIN) 0.1 % nasal spray, Spray 1 spray into both nostrils 2 times daily, Disp: 1 Bottle, Rfl: 11     blood glucose (PAT CONTOUR NEXT) test strip, Use to test blood sugar 6 times daily or as directed., Disp: 2 Box, Rfl: 11     blood glucose monitoring (PAT MICROLET) lancets, Use to test blood sugar 6-8 times daily or as directed., Disp: 100 each, Rfl: 11     calcium carbonate-vitamin D (CALCIUM 500/D) 500-200 MG-UNIT tablet, , Disp: , Rfl:      cetirizine (ZYRTEC) 10 MG tablet, Take 10 mg by mouth daily, Disp: , Rfl:      diphenhydrAMINE (BENADRYL ALLERGY) 25 MG capsule, Take 1 capsule (25 mg) by mouth every 6 hours as needed for itching or allergies, Disp: 56 capsule, Rfl: 0     docusate sodium (COLACE) 100 MG capsule, Take 1 capsule (100 mg) by mouth 2 times daily as needed for constipation,, Disp: 60 capsule, Rfl: 0     estradiol (VAGIFEM) 10 MCG TABS vaginal tablet, Place 1 tablet (10 mcg) vaginally twice a week, Disp: 24 tablet, Rfl: 3     fish oil-omega-3 fatty acids 1000 MG capsule, , Disp: , Rfl:      FOLIC ACID PO, Take 1 mg by mouth daily, Disp: , Rfl:      glucose 40 % GEL gel, Take 15-30 g by mouth every 15 minutes as needed for low blood sugar, Disp: 3 Tube, Rfl: 2     ibuprofen (ADVIL/MOTRIN) 400 MG tablet, Take 1 tablet (400 mg) by mouth every 6 hours as needed for moderate pain, Disp: 30 tablet, Rfl: 0     levothyroxine (SYNTHROID) 137 MCG tablet, Take 1 tablet (137 mcg) by mouth daily, Disp: 90 tablet, Rfl: 3     lubiprostone (AMITIZA) 8 MCG capsule, Take 3 capsules in AM and 2 capsules in PM  More refills after virtual visit with Ms Marti, Disp: 150 capsule, Rfl: 0     magnesium oxide 200 MG TABS, , Disp: , Rfl:      montelukast (SINGULAIR) 10 MG tablet, TAKE 1 TABLET(10 MG) BY MOUTH AT BEDTIME, Disp: 90 tablet, Rfl: 3     multivitamin CF formula (CHOICEFUL) capsule, Take 1 tablet by mouth daily, Disp: , Rfl: 11      Nutritional Supplements (BOOST HIGH PROTEIN) LIQD, After above baseline labs are drawn, give: 6 mL/kg to maximum of 360 mL; the beverage is to be consumed within 5 minutes., Disp: , Rfl: 0     omeprazole (PRILOSEC) 40 MG DR capsule, Take 1 capsule (40 mg) by mouth daily, Disp: 90 capsule, Rfl: 3     omeprazole (PRILOSEC) 40 MG DR capsule, Take 1 capsule (40 mg) by mouth At Bedtime, Disp: 90 capsule, Rfl: 3     order for DME, Equipment being ordered:Cefaly, Disp: 1 Device, Rfl: 0     prochlorperazine (COMPAZINE) 5 MG tablet, Take two 5 mg tablets by mouth every six hours as needed for nausea., Disp: 90 tablet, Rfl: 0     promethazine (PHENERGAN) 25 MG tablet, Take 1 tablet (25 mg) by mouth At Bedtime, Disp: 60 tablet, Rfl: 3     senna-docusate (SENOKOT-S;PERICOLACE) 8.6-50 MG per tablet, Take 1-2 tablets by mouth 2 times daily, Disp: 100 tablet, Rfl: 0     Sharps Container (BD SHARPS ) MISC, 1 Container as needed, Disp: 1 each, Rfl: 1     ZOLMitriptan (ZOMIG) 5 MG tablet, Take 1 tablet (5 mg) by mouth at onset of headache for migraine, Disp: 9 tablet, Rfl: 3    SOCIAL HISTORY:  Social History     Socioeconomic History     Marital status:      Spouse name: Norris     Number of children: 3     Years of education: 16     Highest education level: Not on file   Occupational History     Occupation: director     Employer: [employer]   Social Needs     Financial resource strain: Not on file     Food insecurity     Worry: Not on file     Inability: Not on file     Transportation needs     Medical: Not on file     Non-medical: Not on file   Tobacco Use     Smoking status: Former Smoker     Packs/day: 0.50     Years: 6.00     Pack years: 3.00     Types: Cigarettes     Start date: 1985     Last attempt to quit: 1992     Years since quittin.6     Smokeless tobacco: Never Used     Tobacco comment: no 2nd hand   Substance and Sexual Activity     Alcohol use: Yes     Alcohol/week: 3.0 - 6.0 standard drinks      Types: 1 - 2 Glasses of wine, 1 - 2 Cans of beer, 1 - 2 Shots of liquor per week     Comment: occasional     Drug use: No     Sexual activity: Yes     Partners: Male     Birth control/protection: None     Comment: Hystectomy 1999   Lifestyle     Physical activity     Days per week: Not on file     Minutes per session: Not on file     Stress: Not on file   Relationships     Social connections     Talks on phone: Not on file     Gets together: Not on file     Attends Shinto service: Not on file     Active member of club or organization: Not on file     Attends meetings of clubs or organizations: Not on file     Relationship status: Not on file     Intimate partner violence     Fear of current or ex partner: Not on file     Emotionally abused: Not on file     Physically abused: Not on file     Forced sexual activity: Not on file   Other Topics Concern     Parent/sibling w/ CABG, MI or angioplasty before 65F 55M? No      Service Not Asked     Blood Transfusions No     Caffeine Concern Not Asked     Occupational Exposure Not Asked     Hobby Hazards Not Asked     Sleep Concern Not Asked     Stress Concern Not Asked     Weight Concern Not Asked     Special Diet Not Asked     Back Care Not Asked     Exercise Not Asked     Bike Helmet Not Asked     Seat Belt Yes     Self-Exams Not Asked   Social History Narrative     with 3 children and a dog.  No smoking, etoh or drug use.  Worked as a  for [employer] in [place] in the past.  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       FAMILY HISTORY:  Family History   Problem Relation Age of Onset     Eye Disorder Father         cataract, detached retina     Myocardial Infarction Father 60     Lipids Father      Cerebrovascular Disease Father      Depression Father      Substance Abuse Father      Anesthesia Reaction Father         stroke right after surgery     Cataracts Father      Osteoarthritis Father       Ulcerative Colitis Father      Lipids Mother      Hypertension Mother      Thyroid Disease Mother      Depression Mother      Angina Mother      GERD Mother      Skin Cancer Mother      Eye Disorder Son         ptosis     Eye Disorder Paternal Grandmother         cataract     Eye Disorder Paternal Grandfather         cataract     Diabetes Paternal Grandfather      Eye Disorder Maternal Grandmother         cataract     Thyroid Disease Maternal Grandmother      Coronary Artery Disease Sister         angioplasty     GERD Sister      Substance Abuse Sister      Depression Sister      Depression Son      Anxiety Disorder Son      Thyroid Disease Sister      Diabetes Maternal Grandfather      Depression Nephew      Anxiety Disorder Nephew      Thyroid Disease Nephew      Diabetes Type 2  Cousin         paternal cousin     Heart Disease Paternal Aunt      Diabetes Paternal Aunt      Diabetes Paternal Uncle      Heart Disease Paternal Uncle      Past/family/social history reviewed and no changes    PHYSICAL EXAMINATION:  General appearance: Healthy appearing adult, in no acute distress  Eyes: Sclera anicteric, Pupils round and reactive to light  Ears, nose, mouth and throat: No obvious external lesions of ears and nose, Hearing intact  Neck: Symmetric, No obvious external lesions  Respiratory: Normal respiration, no use of accessory muscles   Skin: No rashes or jaundice   Psychiatric: Oriented to person, place and time, Appropriate mood and affect.       PERTINENT STUDIES:  Orders Only on 09/13/2018   Component Date Value Ref Range Status     WBC 09/13/2018 5.8  4.0 - 11.0 10e9/L Final     RBC Count 09/13/2018 4.51  3.8 - 5.2 10e12/L Final     Hemoglobin 09/13/2018 14.1  11.7 - 15.7 g/dL Final     Hematocrit 09/13/2018 42.1  35.0 - 47.0 % Final     MCV 09/13/2018 93  78 - 100 fl Final     MCH 09/13/2018 31.3  26.5 - 33.0 pg Final     MCHC 09/13/2018 33.5  31.5 - 36.5 g/dL Final     RDW 09/13/2018 13.2  10.0 - 15.0 % Final      Platelet Count 09/13/2018 430  150 - 450 10e9/L Final     Diff Method 09/13/2018 Automated Method   Final     % Neutrophils 09/13/2018 37.1  % Final     % Lymphocytes 09/13/2018 40.5  % Final     % Monocytes 09/13/2018 10.5  % Final     % Eosinophils 09/13/2018 9.8  % Final     % Basophils 09/13/2018 2.1  % Final     % Immature Granulocytes 09/13/2018 0.0  % Final     Nucleated RBCs 09/13/2018 0  0 /100 Final     Absolute Neutrophil 09/13/2018 2.2  1.6 - 8.3 10e9/L Final     Absolute Lymphocytes 09/13/2018 2.4  0.8 - 5.3 10e9/L Final     Absolute Monocytes 09/13/2018 0.6  0.0 - 1.3 10e9/L Final     Absolute Eosinophils 09/13/2018 0.6  0.0 - 0.7 10e9/L Final     Absolute Basophils 09/13/2018 0.1  0.0 - 0.2 10e9/L Final     Abs Immature Granulocytes 09/13/2018 0.0  0 - 0.4 10e9/L Final     Absolute Nucleated RBC 09/13/2018 0.0   Final     Sodium 09/13/2018 138  133 - 144 mmol/L Final     Potassium 09/13/2018 4.3  3.4 - 5.3 mmol/L Final     Chloride 09/13/2018 102  94 - 109 mmol/L Final     Carbon Dioxide 09/13/2018 30  20 - 32 mmol/L Final     Anion Gap 09/13/2018 5  3 - 14 mmol/L Final     Glucose 09/13/2018 119* 70 - 99 mg/dL Final     Urea Nitrogen 09/13/2018 12  7 - 30 mg/dL Final     Creatinine 09/13/2018 0.83  0.52 - 1.04 mg/dL Final     GFR Estimate 09/13/2018 72  >60 mL/min/1.7m2 Final     GFR Estimate If Black 09/13/2018 87  >60 mL/min/1.7m2 Final     Calcium 09/13/2018 9.2  8.5 - 10.1 mg/dL Final     Bilirubin Direct 09/13/2018 0.2  0.0 - 0.2 mg/dL Final     Bilirubin Total 09/13/2018 0.8  0.2 - 1.3 mg/dL Final     Albumin 09/13/2018 3.9  3.4 - 5.0 g/dL Final     Protein Total 09/13/2018 7.8  6.8 - 8.8 g/dL Final     Alkaline Phosphatase 09/13/2018 145  40 - 150 U/L Final     ALT 09/13/2018 59* 0 - 50 U/L Final     AST 09/13/2018 39  0 - 45 U/L Final     IGG 09/13/2018 1160  695 - 1620 mg/dL Final     IgG1 09/13/2018 416  300 - 856 mg/dL Final     IgG2 09/13/2018 384  158 - 761 mg/dL Final     IgG3  09/13/2018 83  24 - 192 mg/dL Final     IgG4 09/13/2018 95* 11 - 86 mg/dL Final

## 2020-08-26 ENCOUNTER — VIRTUAL VISIT (OUTPATIENT)
Dept: INTERNAL MEDICINE | Facility: CLINIC | Age: 55
End: 2020-08-26
Payer: COMMERCIAL

## 2020-08-26 DIAGNOSIS — N95.2 ATROPHIC VAGINITIS: ICD-10-CM

## 2020-08-26 DIAGNOSIS — Z12.11 SCREEN FOR COLON CANCER: ICD-10-CM

## 2020-08-26 DIAGNOSIS — Z12.31 ENCOUNTER FOR SCREENING MAMMOGRAM FOR BREAST CANCER: Primary | ICD-10-CM

## 2020-08-26 DIAGNOSIS — R74.8 ELEVATED LIVER ENZYMES: ICD-10-CM

## 2020-08-26 DIAGNOSIS — E03.9 HYPOTHYROIDISM, UNSPECIFIED TYPE: ICD-10-CM

## 2020-08-26 DIAGNOSIS — G89.4 CHRONIC PAIN SYNDROME: ICD-10-CM

## 2020-08-26 DIAGNOSIS — J30.2 SEASONAL ALLERGIES: ICD-10-CM

## 2020-08-26 DIAGNOSIS — R51.9 HEADACHE DISORDER: ICD-10-CM

## 2020-08-26 RX ORDER — ESTRADIOL 10 UG/1
10 INSERT VAGINAL
Qty: 24 TABLET | Refills: 3 | Status: SHIPPED | OUTPATIENT
Start: 2020-08-27 | End: 2021-11-08

## 2020-08-26 RX ORDER — AMITRIPTYLINE HYDROCHLORIDE 10 MG/1
50 TABLET ORAL AT BEDTIME
Qty: 150 TABLET | Refills: 11 | Status: SHIPPED | OUTPATIENT
Start: 2020-08-26 | End: 2021-09-16

## 2020-08-26 RX ORDER — CETIRIZINE HYDROCHLORIDE 10 MG/1
10 TABLET ORAL DAILY
Qty: 90 TABLET | Refills: 3 | Status: ON HOLD | OUTPATIENT
Start: 2020-08-26 | End: 2023-01-08

## 2020-08-26 RX ORDER — MONTELUKAST SODIUM 10 MG/1
TABLET ORAL
Qty: 90 TABLET | Refills: 3 | Status: SHIPPED | OUTPATIENT
Start: 2020-08-26 | End: 2021-09-16

## 2020-08-26 RX ORDER — LEVOTHYROXINE SODIUM 137 UG/1
137 TABLET ORAL DAILY
Qty: 90 TABLET | Refills: 3 | Status: SHIPPED | OUTPATIENT
Start: 2020-08-26 | End: 2021-10-27

## 2020-08-26 NOTE — PROGRESS NOTES
S: This is a virtual visit to review medications and health care maintenance.  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 She would like nasal spray for allergies as she has been waking up with migraines every day for the last few weeks and she attributes this to allergies. She also takes Zyrtec 10 mg for her allergies. She takes Zantac 300 mg each morning and Prilosec 40 mg at night for her gastroesophageal reflux disease (GERD). She takes compazine and phenergen every day as she always has nausea due to gastroparesis. She has not had any high blood sugars in a long time.  She is up to date on eye and dental exams. She is due for a mammogram in September.     States she has been doing  es and feels good in general.       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     Incisional hernia, without obstruction or gangrene     Adhesive capsulitis of shoulder, unspecified laterality     S/P hernia repair     Headache, chronic migraine without aura     Chronic pain syndrome     Iron deficiency anemia            Past Medical History:   Diagnosis Date     Allergic rhinitis, cause unspecified      Allergy to other foods     strawberries, apples, celeries, alice, watermelon      Arthritis     left knee     Choledocholithiasis     long after cholecystectomy     Chronic abdominal pain      Chronic constipation      Chronic nausea      Chronic pancreatitis (H)      Degeneration of lumbar or lumbosacral intervertebral disc      Esophageal reflux     w/ hiatal hernia     Gastroparesis      Hiatal hernia      History of pituitary adenoma     s/p resection     Hypothyroidism      Migraines      Mild hyperlipidemia      On tube feeding diet     presence of GJ tube     Pancreatic disease     PD stricture, 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 endometriosis growth     APPENDECTOMY       C  DELIVERY ONLY       C  DELIVERY ONLY      repeat c section with incidental cystotomy with repair     C EXCIS PITUITARY,TRANSNASAL/SEPTAL      pituitary tumor removed for diabetes insipidus     C TOTAL ABDOM HYSTERECTOMY      w/ bilateral salpingoophorectomy       SECTION       COLONOSCOPY       ENDOSCOPIC RETROGRADE CHOLANGIOPANCREATOGRAM N/A 2015    Procedure: ENDOSCOPIC RETROGRADE CHOLANGIOPANCREATOGRAM;  Surgeon: Mandeep Park MD;  [facility]: [facility]     ENDOSCOPIC RETROGRADE CHOLANGIOPANCREATOGRAM N/A 2016    Procedure: COMBINED ENDOSCOPIC RETROGRADE CHOLANGIOPANCREATOGRAPHY, PLACE TUBE/STENT;  Surgeon: Mandeep Park MD;  [facility]     ENDOSCOPIC RETROGRADE CHOLANGIOPANCREATOGRAM N/A 3/17/2016    Procedure: COMBINED ENDOSCOPIC RETROGRADE CHOLANGIOPANCREATOGRAPHY, REMOVE FOREIGN BODY OR STENT/TUBE;  Surgeon: Mandeep Park MD;  [facility]     ENDOSCOPIC RETROGRADE CHOLANGIOPANCREATOGRAM N/A 2016    Procedure: COMBINED ENDOSCOPIC RETROGRADE CHOLANGIOPANCREATOGRAPHY,  PLACE TUBE/STENT;  Surgeon: Mandeep Park MD;  [facility]     ENDOSCOPIC RETROGRADE CHOLANGIOPANCREATOGRAM N/A 8/26/2016    Procedure: COMBINED ENDOSCOPIC RETROGRADE CHOLANGIOPANCREATOGRAPHY, REMOVE FOREIGN BODY OR STENT/TUBE;  Surgeon: Mandeep Park MD;  [facility]     ENDOSCOPIC ULTRASOUND UPPER GASTROINTESTINAL TRACT (GI) N/A 10/3/2016    Procedure: ENDOSCOPIC ULTRASOUND, ESOPHAGOSCOPY / UPPER GASTROINTESTINAL TRACT (GI);  Surgeon: Guru Jose Rodas MD;  [facility]     ESOPHAGOSCOPY, GASTROSCOPY, DUODENOSCOPY (EGD), COMBINED N/A 6/24/2015    Procedure: COMBINED ESOPHAGOSCOPY, GASTROSCOPY, DUODENOSCOPY (EGD), REMOVE FOREIGN BODY;  Surgeon: Mandeep Park MD;  [facility]     ESOPHAGOSCOPY, GASTROSCOPY, DUODENOSCOPY (EGD), COMBINED N/A 10/25/2015    Procedure: COMBINED ESOPHAGOSCOPY, GASTROSCOPY, DUODENOSCOPY (EGD);  Surgeon: Sammy Amaro MD;  [facility]     ESOPHAGOSCOPY, GASTROSCOPY, DUODENOSCOPY (EGD), COMBINED N/A 10/25/2015    Procedure: COMBINED ESOPHAGOSCOPY, GASTROSCOPY, DUODENOSCOPY (EGD), BIOPSY SINGLE OR MULTIPLE;  Surgeon: Sammy Amaro MD;  [facility]     ESOPHAGOSCOPY, GASTROSCOPY, DUODENOSCOPY (EGD), DILATATION, COMBINED       EXCISE LESION TRUNK N/A 4/17/2017    Procedure: EXCISE LESION TRUNK;  Removal of Abdominal Foreign Body;  Surgeon: Nestor Phoenix MD;  [facility]     HC ESOPH/GAS REFLUX TEST W NASAL IMPED >1 HR N/A 11/19/2015    Procedure: ESOPHAGEAL IMPEDENCE FUNCTION TEST WITH 24 HOUR PH GREATER THAN 1 HOUR;  Surgeon: Thiago Apple MD;  [facility]     HC UGI ENDOSCOPY DIAG W BIOPSY  9/17/08     HC UGI ENDOSCOPY DIAG W BIOPSY  9/27/12     HC UGI ENDOSCOPY W ESOPHAGEAL DILATION BALLOON <30MM  9/17/08     HC UGI ENDOSCOPY W EUS N/A 5/5/2015    Procedure: COMBINED ENDOSCOPIC ULTRASOUND, ESOPHAGOSCOPY, GASTROSCOPY, DUODENOSCOPY (EGD);  Surgeon: Wm Dueñas MD;  Location: [facility]      HC WRIST ARTHROSCOP,RELEASE XVERS LIG Bilateral 08     INJECT TRANSVERSUS ABDOMINIS PLANE (TAP) BLOCK BILATERAL Left 2016    Procedure: INJECT TRANSVERSUS ABDOMINIS PLANE (TAP) BLOCK BILATERAL;  Surgeon: Dickson Corrigan MD;  [facility]     laparoscopic pineda       LAPAROSCOPIC HERNIORRHAPHY INCISIONAL N/A 2018    Procedure: LAPAROSCOPIC HERNIORRHAPHY INCISIONAL;  Laparoscopic Incisional Hernia Repair with Symbotex Mesh Implant;  Surgeon: Nestor Phoenix MD;  [facility]     LAPAROSCOPIC PANCREATECTOMY, TRANSPLANT AUTO ISLET CELL N/A 2016    Procedure: LAPAROSCOPIC PANCREATECTOMY, TRANSPLANT AUTO ISLET CELL;  Surgeon: Nestor Phoenix MD;  Location: [facility]     transphenoidal pituitary resection            Social History     Tobacco Use     Smoking status: Former Smoker     Packs/day: 0.50     Years: 6.00     Pack years: 3.00     Types: Cigarettes     Start date: 1985     Last attempt to quit: 1992     Years since quittin.6     Smokeless tobacco: Never Used     Tobacco comment: no 2nd hand   Substance Use Topics     Alcohol use: Yes     Alcohol/week: 3.0 - 6.0 standard drinks     Types: 1 - 2 Glasses of wine, 1 - 2 Cans of beer, 1 - 2 Shots of liquor per week     Comment: occasional            Family History   Problem Relation Age of Onset     Eye Disorder Father         cataract, detached retina     Myocardial Infarction Father 60     Lipids Father      Cerebrovascular Disease Father      Depression Father      Substance Abuse Father      Anesthesia Reaction Father         stroke right after surgery     Cataracts Father      Osteoarthritis Father      Ulcerative Colitis Father      Lipids Mother      Hypertension Mother      Thyroid Disease Mother      Depression Mother      Angina Mother      GERD Mother      Skin Cancer Mother      Eye Disorder Son         ptosis     Eye Disorder Paternal Grandmother         cataract     Eye Disorder Paternal Grandfather          cataract     Diabetes Paternal Grandfather      Eye Disorder Maternal Grandmother         cataract     Thyroid Disease Maternal Grandmother      Coronary Artery Disease Sister         angioplasty     GERD Sister      Substance Abuse Sister      Depression Sister      Depression Son      Anxiety Disorder Son      Thyroid Disease Sister      Diabetes Maternal Grandfather      Depression Nephew      Anxiety Disorder Nephew      Thyroid Disease Nephew      Diabetes Type 2  Cousin         paternal cousin     Heart Disease Paternal Aunt      Diabetes Paternal Aunt      Diabetes Paternal Uncle      Heart Disease Paternal Uncle                Allergies   Allergen Reactions     Apple Anaphylaxis     Corticosteroids Other (See Comments)     All oral, IV and injectable steroids are contraindicated (unless in life threatening situations) in [facility] [employer] transplant recipients. They can cause irreversible loss of islet cell function. Please contact patient's transplant care coordinator ADI Gaffney RN at [phone]/pager [phone] and/or endocrinologist prior to administration.       Depakote [Divalproex Sodium] Other (See Comments)     Chest pain     Zithromax [Azithromycin Dihydrate] Anaphylaxis     Noted in 4/7/08 ER     Darvocet [Propoxyphene N-Apap] Itching     Morphine Nausea and Vomiting and Rash     Nalbuphine Hcl Rash     RASH :nubaine     Zosyn [Piperacillin-Tazobactam In D5w] Rash     Possible allergy, did have a diffuse rash that seemed drug related but could have also been related to soap in the hospital.      Bactrim [Sulfamethoxazole W-Trimethoprim] Other (See Comments) and Nausea and Vomiting     Severely low liver function.     Cats      Compazine [Prochlorperazine] Other (See Comments)     Twitching. Takes Benedryl and is fine     Dust Mites      Grass      Prednisone Other (See Comments)     Insomnia       Ragweeds      Tape [Adhesive Tape] Blisters     Tramadol      Trees      Zofran [Ondansetron] Other  (See Comments)     migraine     Flagyl [Metronidazole] Hives and Rash            Current Outpatient Medications   Medication Sig Dispense Refill     amitriptyline (ELAVIL) 10 MG tablet Take 5 tablets (50 mg) by mouth At Bedtime Take 40 mg at bedtime 150 tablet 11     amylase-lipase-protease (CREON 24) 35416-50262 units CPEP per EC capsule Take 3-4 capsules by mouth with meals and 1-2 with snacks. Maximum 15 capsules per day. 450 capsule 11     aspirin 81 MG EC tablet Take 1 tablet (81 mg) by mouth daily 90 tablet 3     azelastine (ASTELIN) 0.1 % nasal spray Spray 1 spray into both nostrils 2 times daily 1 Bottle 11     blood glucose (PAT CONTOUR NEXT) test strip Use to test blood sugar 6 times daily or as directed. 2 Box 11     blood glucose monitoring (PAT MICROLET) lancets Use to test blood sugar 6-8 times daily or as directed. 100 each 11     calcium carbonate-vitamin D (CALCIUM 500/D) 500-200 MG-UNIT tablet        cetirizine (ZYRTEC) 10 MG tablet Take 1 tablet (10 mg) by mouth daily 90 tablet 3     diphenhydrAMINE (BENADRYL ALLERGY) 25 MG capsule Take 1 capsule (25 mg) by mouth every 6 hours as needed for itching or allergies 56 capsule 0     docusate sodium (COLACE) 100 MG capsule Take 1 capsule (100 mg) by mouth 2 times daily as needed for constipation, 60 capsule 0     [START ON 8/27/2020] estradiol (VAGIFEM) 10 MCG TABS vaginal tablet Place 1 tablet (10 mcg) vaginally twice a week 24 tablet 3     famotidine (PEPCID) 20 MG tablet Take 1 tablet (20 mg) by mouth 2 times daily 90 tablet 3     fish oil-omega-3 fatty acids 1000 MG capsule        FOLIC ACID PO Take 1 mg by mouth daily       glucose 40 % GEL gel Take 15-30 g by mouth every 15 minutes as needed for low blood sugar 3 Tube 2     ibuprofen (ADVIL/MOTRIN) 400 MG tablet Take 1 tablet (400 mg) by mouth every 6 hours as needed for moderate pain 30 tablet 0     levothyroxine (SYNTHROID) 137 MCG tablet Take 1 tablet (137 mcg) by mouth daily 90 tablet 3      lubiprostone (AMITIZA) 8 MCG capsule Take 3 capsules in AM and 2 capsules in PM  More refills after virtual visit with Ms Marti 150 capsule 11     magnesium oxide 200 MG TABS        montelukast (SINGULAIR) 10 MG tablet TAKE 1 TABLET(10 MG) BY MOUTH AT BEDTIME 90 tablet 3     multivitamin CF formula (CHOICEFUL) capsule Take 1 tablet by mouth daily  11     Nutritional Supplements (BOOST HIGH PROTEIN) LIQD After above baseline labs are drawn, give: 6 mL/kg to maximum of 360 mL; the beverage is to be consumed within 5 minutes.  0     omeprazole (PRILOSEC) 40 MG DR capsule Take 1 capsule (40 mg) by mouth At Bedtime 90 capsule 3     omeprazole (PRILOSEC) 40 MG DR capsule Take 1 capsule (40 mg) by mouth daily 90 capsule 3     order for DME Equipment being ordered:Cefaly 1 Device 0     prochlorperazine (COMPAZINE) 5 MG tablet Take two 5 mg tablets by mouth every six hours as needed for nausea. 90 tablet 3     promethazine (PHENERGAN) 25 MG tablet Take 1 tablet (25 mg) by mouth At Bedtime 90 tablet 3     senna-docusate (SENOKOT-S;PERICOLACE) 8.6-50 MG per tablet Take 1-2 tablets by mouth 2 times daily 100 tablet 0     Sharps Container (BD SHARPS ) MISC 1 Container as needed 1 each 1     ZOLMitriptan (ZOMIG) 5 MG tablet Take 1 tablet (5 mg) by mouth at onset of headache for migraine 9 tablet 3     acetaminophen 500 MG CAPS Take 2 mg by mouth 2 times daily          REVIEW OF SYSTEMS:  Negative x 7  O:   There were no vitals taken for this visit.  GENERAL APPEARANCE: healthy, alert and no distress  RESP: no distress.  NEURO: grossly normal, alert and oriented. Normal speech.   MUSK: grossly normal.  PSYCHE: normal.     A/P:  Dinora was seen today for recheck medication.    Diagnoses and all orders for this visit:    Encounter for screening mammogram for breast cancer  -     Mammogram, routine screening; Future    Elevated liver enzymes  -     cetirizine (ZYRTEC) 10 MG tablet; Take 1 tablet (10 mg) by mouth  daily    Seasonal allergies  -     montelukast (SINGULAIR) 10 MG tablet; TAKE 1 TABLET(10 MG) BY MOUTH AT BEDTIME    Hypothyroidism, unspecified type  -     levothyroxine (SYNTHROID) 137 MCG tablet; Take 1 tablet (137 mcg) by mouth daily  -     TSH; Future    Atrophic vaginitis  -     estradiol (VAGIFEM) 10 MCG TABS vaginal tablet; Place 1 tablet (10 mcg) vaginally twice a week    Chronic pain syndrome  -     amitriptyline (ELAVIL) 10 MG tablet; Take 5 tablets (50 mg) by mouth At Bedtime Take 40 mg at bedtime    Headache disorder  -     amitriptyline (ELAVIL) 10 MG tablet; Take 5 tablets (50 mg) by mouth At Bedtime Take 40 mg at bedtime    Screen for colon cancer  -     Fecal cancer screen FIT; Future      Total time spent : 30 minutes.  .  More than 50% of the time spent with Ms. Mcghee on counseling / coordinating her care.    Latasha INGRAM, CNP

## 2020-08-30 ENCOUNTER — MYC MEDICAL ADVICE (OUTPATIENT)
Dept: GASTROENTEROLOGY | Facility: CLINIC | Age: 55
End: 2020-08-30

## 2020-08-30 DIAGNOSIS — K21.9 GASTROESOPHAGEAL REFLUX DISEASE WITHOUT ESOPHAGITIS: ICD-10-CM

## 2020-09-04 RX ORDER — OMEPRAZOLE 40 MG/1
40 CAPSULE, DELAYED RELEASE ORAL 2 TIMES DAILY
Qty: 180 CAPSULE | Refills: 3 | Status: SHIPPED | OUTPATIENT
Start: 2020-09-04 | End: 2020-11-25

## 2020-09-14 ENCOUNTER — ANCILLARY PROCEDURE (OUTPATIENT)
Dept: MAMMOGRAPHY | Facility: CLINIC | Age: 55
End: 2020-09-14
Attending: NURSE PRACTITIONER
Payer: COMMERCIAL

## 2020-09-14 DIAGNOSIS — Z12.31 VISIT FOR SCREENING MAMMOGRAM: ICD-10-CM

## 2020-09-14 DIAGNOSIS — Z12.31 ENCOUNTER FOR SCREENING MAMMOGRAM FOR BREAST CANCER: ICD-10-CM

## 2020-09-14 DIAGNOSIS — R53.83 FATIGUE, UNSPECIFIED TYPE: ICD-10-CM

## 2020-09-14 DIAGNOSIS — E03.9 HYPOTHYROIDISM, UNSPECIFIED TYPE: ICD-10-CM

## 2020-09-14 LAB
BASOPHILS # BLD AUTO: 0.1 10E9/L (ref 0–0.2)
BASOPHILS NFR BLD AUTO: 1.5 %
DIFFERENTIAL METHOD BLD: NORMAL
EOSINOPHIL # BLD AUTO: 0.2 10E9/L (ref 0–0.7)
EOSINOPHIL NFR BLD AUTO: 2.4 %
ERYTHROCYTE [DISTWIDTH] IN BLOOD BY AUTOMATED COUNT: 13.8 % (ref 10–15)
FERRITIN SERPL-MCNC: 181 NG/ML (ref 8–252)
HCT VFR BLD AUTO: 42.9 % (ref 35–47)
HGB BLD-MCNC: 14.3 G/DL (ref 11.7–15.7)
IMM GRANULOCYTES # BLD: 0 10E9/L (ref 0–0.4)
IMM GRANULOCYTES NFR BLD: 0.2 %
IRON SATN MFR SERPL: 35 % (ref 15–46)
IRON SERPL-MCNC: 88 UG/DL (ref 35–180)
LYMPHOCYTES # BLD AUTO: 2.6 10E9/L (ref 0.8–5.3)
LYMPHOCYTES NFR BLD AUTO: 42.6 %
MCH RBC QN AUTO: 32.5 PG (ref 26.5–33)
MCHC RBC AUTO-ENTMCNC: 33.3 G/DL (ref 31.5–36.5)
MCV RBC AUTO: 98 FL (ref 78–100)
MONOCYTES # BLD AUTO: 0.7 10E9/L (ref 0–1.3)
MONOCYTES NFR BLD AUTO: 11.8 %
NEUTROPHILS # BLD AUTO: 2.6 10E9/L (ref 1.6–8.3)
NEUTROPHILS NFR BLD AUTO: 41.5 %
NRBC # BLD AUTO: 0 10*3/UL
NRBC BLD AUTO-RTO: 0 /100
PLATELET # BLD AUTO: 353 10E9/L (ref 150–450)
RBC # BLD AUTO: 4.4 10E12/L (ref 3.8–5.2)
TIBC SERPL-MCNC: 251 UG/DL (ref 240–430)
TSH SERPL DL<=0.005 MIU/L-ACNC: 2.41 MU/L (ref 0.4–4)
WBC # BLD AUTO: 6.2 10E9/L (ref 4–11)

## 2020-09-18 DIAGNOSIS — N95.2 ATROPHIC VAGINITIS: ICD-10-CM

## 2020-09-21 RX ORDER — ESTRADIOL 10 UG/1
INSERT VAGINAL
Qty: 24 TABLET | Refills: 3 | OUTPATIENT
Start: 2020-09-21

## 2020-09-23 DIAGNOSIS — Z12.11 SCREEN FOR COLON CANCER: ICD-10-CM

## 2020-09-23 PROCEDURE — 82274 ASSAY TEST FOR BLOOD FECAL: CPT | Performed by: NURSE PRACTITIONER

## 2020-09-26 LAB — HEMOCCULT STL QL IA: NEGATIVE

## 2020-11-16 ENCOUNTER — HEALTH MAINTENANCE LETTER (OUTPATIENT)
Age: 55
End: 2020-11-16

## 2020-11-16 ENCOUNTER — PATIENT OUTREACH (OUTPATIENT)
Dept: GASTROENTEROLOGY | Facility: CLINIC | Age: 55
End: 2020-11-16

## 2020-11-16 NOTE — PROGRESS NOTES
Patient requesting an appointment with Ms. Marti  Pt asking for a in office visit.  States that at this time do not have in clinic vsiits  States she thinks she has a hernia.   Thinks that might be her mesh poking out   Informed her that should be seen by surgeon  Sent a message to Ms. Gaffney to call pt and discuss.

## 2020-11-18 ENCOUNTER — TELEPHONE (OUTPATIENT)
Dept: TRANSPLANT | Facility: CLINIC | Age: 55
End: 2020-11-18

## 2020-11-18 NOTE — TELEPHONE ENCOUNTER
"Call from Dinora at 4:15. I had spoken to her yesterday about some abdominal symptoms under her left ribs, she thought it might have been the site of her previous hernia repair\" popped the mesh ? \"She has had these symptoms for more than a few days.    Today she called to say she has had rapidly  increasing abdominal pain and significant distension over the last 2 hours.A picture she emailed shows this. She has difficulty drinking water \"because it feels like there is a lot in my stomach\". She is not passing gas.Did not have a\"normal \" bowel movement today.I heard her gasp with pain a couple of times when I was speaking to her.No significant nausea and she has not vomited.,  Advised that she needs to be examined because of the possibility of a small bowel obstruction.  At the end of the conversation she decided she would start at her local ER .  "

## 2020-11-23 ENCOUNTER — TELEPHONE (OUTPATIENT)
Dept: GASTROENTEROLOGY | Facility: CLINIC | Age: 55
End: 2020-11-23

## 2020-11-23 NOTE — TELEPHONE ENCOUNTER
Prior Authorization Specialty Medication Request    Medication/Dose: omeprazole 40 mg bid ICD code (if different than what is on RX):    Previously Tried and Failed:      Important Lab Values:   Rationale:   Patient experiences GERD as substernal and throat burning with nocturnal relaxant causing choking. She had previously followed with Dr. George Flores in out clinic and reports a prior Schatzki's ring requiring previous dilation  Insurance Name:   Coverage information:     Subscriber: [id number] HUDSON DAVIS     Rel to sub: 02 - Spouse     Member ID: [id number]     Payor: [employer] Ph: [phone]     Benefit plan: [employer] [place] Ph: [phone]     Group number: [id number]     Member effective dates: from 01/01/17        Insurance ID:   Insurance Phone Number:     Pharmacy Information (if different than what is on RX)  Name:    Phone:

## 2020-11-23 NOTE — TELEPHONE ENCOUNTER
Central Prior Authorization Team   [phone]    PA Initiation    Medication: omeprazole (PRILOSEC) 40 MG  capsule  Insurance Company: [employer] [place] - Phone [phone] Fax [phone]  Pharmacy Filling the Rx: [facility] #[id number] [place] [id number]  AT [facility] [address]  Filling Pharmacy Phone: [phone]  Filling Pharmacy Fax: [phone]  Start Date: 11/23/2020

## 2020-11-24 NOTE — TELEPHONE ENCOUNTER
Prior Authorization Approval    Authorization Effective Date: 11/18/2020  Authorization Expiration Date: 11/18/2021  Medication: omeprazole (PRILOSEC) 40 MG DR capsule-PA APPROVED  Approved Dose/Quantity:   Reference #:     Insurance Company: [employer] [place] - Phone [phone] Fax [phone]  Expected CoPay: NO CO-PAY     CoPay Card Available:      Foundation Assistance Needed:    Which Pharmacy is filling the prescription (Not needed for infusion/clinic administered): [id number] - [place] [address]  AT [facility] [address]   Pharmacy Notified: Yes- **Instructed pharmacy to notify patient when script is ready to /ship.**  Patient Notified: Yes

## 2020-11-29 ENCOUNTER — MYC REFILL (OUTPATIENT)
Dept: TRANSPLANT | Facility: CLINIC | Age: 55
End: 2020-11-29

## 2020-11-29 ENCOUNTER — MYC REFILL (OUTPATIENT)
Dept: INTERNAL MEDICINE | Facility: CLINIC | Age: 55
End: 2020-11-29

## 2020-11-29 DIAGNOSIS — E13.9 POST-PANCREATECTOMY DIABETES (H): ICD-10-CM

## 2020-11-29 DIAGNOSIS — Z90.410 POST-PANCREATECTOMY DIABETES (H): ICD-10-CM

## 2020-11-29 DIAGNOSIS — R51.9 HEADACHE DISORDER: ICD-10-CM

## 2020-11-29 DIAGNOSIS — E89.1 POST-PANCREATECTOMY DIABETES (H): ICD-10-CM

## 2020-11-30 NOTE — TELEPHONE ENCOUNTER
ZOLMitriptan (ZOMIG) 5 MG tablet       Last Written Prescription Date:  9-19-*1+9  Last Fill Quantity: 9,   # refills: 3  Last Office Visit : 8-  Future Office visit:  none    Routing refill request to provider for review/approval because:  Per last clinic note:  Headache disorder  -     amitriptyline (ELAVIL) 10 MG tablet; Take 5 tablets (50 mg) by mouth At Bedtime Take 40 mg at bedtime  ZOLMitriptan (ZOMIG) 5 MG tablet  - not adresssed.      Kathleen M Doege RN

## 2020-12-01 RX ORDER — ZOLMITRIPTAN 5 MG/1
5 TABLET, FILM COATED ORAL
Qty: 9 TABLET | Refills: 3 | Status: SHIPPED | OUTPATIENT
Start: 2020-12-01 | End: 2021-05-20

## 2020-12-06 ENCOUNTER — MYC MEDICAL ADVICE (OUTPATIENT)
Dept: GASTROENTEROLOGY | Facility: CLINIC | Age: 55
End: 2020-12-06

## 2020-12-06 DIAGNOSIS — R10.13 ABDOMINAL PAIN, EPIGASTRIC: Primary | ICD-10-CM

## 2020-12-07 NOTE — TELEPHONE ENCOUNTER
Patient reports that she had been feeling pretty well up until recently. She had been working on being a vegetarian.  Early November the patient noted worsening symptoms.  He was seen at the [facility] emergency department on November 18.  At that time, blood work notable for normal BMP, low lipase, unremarkable hepatic panel.  CBC without elevation of WBCs though she did have elevated platelets, neutrophils and lymphocytes.  CT at that time with moderate stool burden, nonspecific fluid in small intestine.  No evidence of obstruction per documentation, though we do not have official report available in the records.  I have asked our records team to try and obtain this.      The patient reports that he had an episode of similar symptoms again yesterday.  Describes sensation that food is getting stuck in her upper abdomen under her rib cage.  States that there is significant borborygmi.  Feels abdominal pain, bloating discomfort on both sides of her abdomen.  It feels tender and swollen to touch.  Symptoms started around 11:30 AM yesterday, notes that she had 3 bowel movements when this happened which she described as dark, floating and looser and 3 episodes of bilious appearing vomiting.  Yesterday was passing increased gas.  Today pain has improved, states it is 4 out of 10 severity.  Has not had a bowel movement or passed gas today.  Denies any fevers, chills.  Does feel exhausted.  Notes that he does not have a significant appetite.  Reports that prior to her first episode he had increased black beans.  Prior to the latest episode, she did have chickpeas.    We discussed emergency room work-up and current symptoms in depth.  Though she does not feel constipated, would be concerned that she has stool burden worsening upper GI symptoms.  Would recommend slightly increasing magnesium oxide pill up to 400 mg a day.  Should monitor bowel movements.  Patient is also concerned about mechanical issue.  No  evidence of clear obstruction on outside CT scan, will plan to evaluate for flow with upper GI series and small bowel follow-through.  Given her history of delayed gastric emptying, recent episodes may have been due to increased fiber in the setting of going vegetarian.  Would recommend that she meet with our dietitian to discuss this further as nutritionally she is very complicated with Creon, diabetes mellitus and delayed gastric emptying.    We also discussed that if symptoms again severely worsen and do not improve with liquid diet, he may need to go to the emergency department again.    Rosanne Marti PA-C  Division of Gastroenterology, Hepatology & Nutrition  [facility]

## 2020-12-11 DIAGNOSIS — K86.89 PANCREATIC INSUFFICIENCY: ICD-10-CM

## 2020-12-11 DIAGNOSIS — E89.1 POST-PANCREATECTOMY DIABETES (H): Primary | ICD-10-CM

## 2020-12-11 DIAGNOSIS — E13.9 POST-PANCREATECTOMY DIABETES (H): Primary | ICD-10-CM

## 2020-12-11 DIAGNOSIS — Z90.410 POST-PANCREATECTOMY DIABETES (H): Primary | ICD-10-CM

## 2020-12-17 ENCOUNTER — ANCILLARY PROCEDURE (OUTPATIENT)
Dept: GENERAL RADIOLOGY | Facility: CLINIC | Age: 55
End: 2020-12-17
Attending: PHYSICIAN ASSISTANT
Payer: COMMERCIAL

## 2020-12-17 ENCOUNTER — HOSPITAL ENCOUNTER (OUTPATIENT)
Facility: CLINIC | Age: 55
Setting detail: SPECIMEN
Discharge: HOME | End: 2020-12-17
Admitting: INTERNAL MEDICINE
Payer: COMMERCIAL

## 2020-12-17 DIAGNOSIS — R10.13 ABDOMINAL PAIN, EPIGASTRIC: ICD-10-CM

## 2020-12-17 DIAGNOSIS — Z90.410 POST-PANCREATECTOMY DIABETES (H): ICD-10-CM

## 2020-12-17 DIAGNOSIS — K86.89 PANCREATIC INSUFFICIENCY: ICD-10-CM

## 2020-12-17 DIAGNOSIS — E89.1 POST-PANCREATECTOMY DIABETES (H): ICD-10-CM

## 2020-12-17 DIAGNOSIS — E13.9 POST-PANCREATECTOMY DIABETES (H): ICD-10-CM

## 2020-12-17 LAB
ALBUMIN SERPL-MCNC: 3.9 G/DL (ref 3.4–5)
ALP SERPL-CCNC: 108 U/L (ref 40–150)
ALT SERPL W P-5'-P-CCNC: 94 U/L (ref 0–50)
ANION GAP SERPL CALCULATED.3IONS-SCNC: 4 MMOL/L (ref 3–14)
AST SERPL W P-5'-P-CCNC: 63 U/L (ref 0–45)
BASOPHILS # BLD AUTO: 0.1 10E9/L (ref 0–0.2)
BASOPHILS NFR BLD AUTO: 1 %
BILIRUB SERPL-MCNC: 0.5 MG/DL (ref 0.2–1.3)
BUN SERPL-MCNC: 9 MG/DL (ref 7–30)
C PEPTIDE SERPL-MCNC: 1.2 NG/ML (ref 0.9–6.9)
CALCIUM SERPL-MCNC: 9.1 MG/DL (ref 8.5–10.1)
CHLORIDE SERPL-SCNC: 106 MMOL/L (ref 94–109)
CO2 SERPL-SCNC: 32 MMOL/L (ref 20–32)
CREAT SERPL-MCNC: 0.78 MG/DL (ref 0.52–1.04)
DIFFERENTIAL METHOD BLD: ABNORMAL
EOSINOPHIL # BLD AUTO: 0.3 10E9/L (ref 0–0.7)
EOSINOPHIL NFR BLD AUTO: 6 %
ERYTHROCYTE [DISTWIDTH] IN BLOOD BY AUTOMATED COUNT: 13.2 % (ref 10–15)
FERRITIN SERPL-MCNC: 235 NG/ML (ref 8–252)
GFR SERPL CREATININE-BSD FRML MDRD: 85 ML/MIN/{1.73_M2}
GLUCOSE SERPL-MCNC: 120 MG/DL (ref 70–99)
HBA1C MFR BLD: 5.5 % (ref 0–5.6)
HCT VFR BLD AUTO: 41.4 % (ref 35–47)
HGB BLD-MCNC: 13.5 G/DL (ref 11.7–15.7)
IMM GRANULOCYTES # BLD: 0 10E9/L (ref 0–0.4)
IMM GRANULOCYTES NFR BLD: 0.2 %
IRON SATN MFR SERPL: 34 % (ref 15–46)
IRON SERPL-MCNC: 74 UG/DL (ref 35–180)
LYMPHOCYTES # BLD AUTO: 2.7 10E9/L (ref 0.8–5.3)
LYMPHOCYTES NFR BLD AUTO: 53 %
MCH RBC QN AUTO: 31.5 PG (ref 26.5–33)
MCHC RBC AUTO-ENTMCNC: 32.6 G/DL (ref 31.5–36.5)
MCV RBC AUTO: 97 FL (ref 78–100)
MONOCYTES # BLD AUTO: 0.5 10E9/L (ref 0–1.3)
MONOCYTES NFR BLD AUTO: 10 %
NEUTROPHILS # BLD AUTO: 1.5 10E9/L (ref 1.6–8.3)
NEUTROPHILS NFR BLD AUTO: 29.8 %
NRBC # BLD AUTO: 0 10*3/UL
NRBC BLD AUTO-RTO: 0 /100
PLATELET # BLD AUTO: 363 10E9/L (ref 150–450)
POTASSIUM SERPL-SCNC: 3.5 MMOL/L (ref 3.4–5.3)
PREALB SERPL IA-MCNC: 19 MG/DL (ref 15–45)
PROT SERPL-MCNC: 7.2 G/DL (ref 6.8–8.8)
RBC # BLD AUTO: 4.29 10E12/L (ref 3.8–5.2)
SODIUM SERPL-SCNC: 142 MMOL/L (ref 133–144)
TIBC SERPL-MCNC: 216 UG/DL (ref 240–430)
VIT B12 SERPL-MCNC: 1460 PG/ML (ref 193–986)
WBC # BLD AUTO: 5 10E9/L (ref 4–11)

## 2020-12-17 PROCEDURE — 82728 ASSAY OF FERRITIN: CPT | Performed by: PATHOLOGY

## 2020-12-17 PROCEDURE — 84134 ASSAY OF PREALBUMIN: CPT | Performed by: PATHOLOGY

## 2020-12-17 PROCEDURE — 80053 COMPREHEN METABOLIC PANEL: CPT | Performed by: PATHOLOGY

## 2020-12-17 PROCEDURE — 84630 ASSAY OF ZINC: CPT | Mod: 90 | Performed by: PATHOLOGY

## 2020-12-17 PROCEDURE — 74248 X-RAY SM INT F-THRU STD: CPT | Mod: GC | Performed by: RADIOLOGY

## 2020-12-17 PROCEDURE — 83540 ASSAY OF IRON: CPT | Performed by: PATHOLOGY

## 2020-12-17 PROCEDURE — 82542 COL CHROMOTOGRAPHY QUAL/QUAN: CPT | Mod: 90 | Performed by: PATHOLOGY

## 2020-12-17 PROCEDURE — 84446 ASSAY OF VITAMIN E: CPT | Mod: 90 | Performed by: PATHOLOGY

## 2020-12-17 PROCEDURE — 74240 X-RAY XM UPR GI TRC 1CNTRST: CPT | Mod: GC | Performed by: RADIOLOGY

## 2020-12-17 PROCEDURE — 82607 VITAMIN B-12: CPT | Performed by: PATHOLOGY

## 2020-12-17 PROCEDURE — 82747 ASSAY OF FOLIC ACID RBC: CPT | Mod: 90 | Performed by: PATHOLOGY

## 2020-12-17 PROCEDURE — 84681 ASSAY OF C-PEPTIDE: CPT | Mod: 90 | Performed by: PATHOLOGY

## 2020-12-17 PROCEDURE — 99000 SPECIMEN HANDLING OFFICE-LAB: CPT | Performed by: PATHOLOGY

## 2020-12-17 PROCEDURE — 36415 COLL VENOUS BLD VENIPUNCTURE: CPT | Performed by: PATHOLOGY

## 2020-12-17 PROCEDURE — 82787 IGG 1 2 3 OR 4 EACH: CPT | Performed by: INTERNAL MEDICINE

## 2020-12-17 PROCEDURE — 82306 VITAMIN D 25 HYDROXY: CPT | Mod: 90 | Performed by: PATHOLOGY

## 2020-12-17 PROCEDURE — 83550 IRON BINDING TEST: CPT | Performed by: PATHOLOGY

## 2020-12-17 PROCEDURE — 83036 HEMOGLOBIN GLYCOSYLATED A1C: CPT | Performed by: PATHOLOGY

## 2020-12-17 PROCEDURE — 82784 ASSAY IGA/IGD/IGG/IGM EACH: CPT | Performed by: INTERNAL MEDICINE

## 2020-12-17 PROCEDURE — 85025 COMPLETE CBC W/AUTO DIFF WBC: CPT | Performed by: PATHOLOGY

## 2020-12-17 PROCEDURE — 84590 ASSAY OF VITAMIN A: CPT | Mod: 90 | Performed by: PATHOLOGY

## 2020-12-18 ENCOUNTER — VIRTUAL VISIT (OUTPATIENT)
Dept: GASTROENTEROLOGY | Facility: CLINIC | Age: 55
End: 2020-12-18
Payer: COMMERCIAL

## 2020-12-18 VITALS — HEIGHT: 67 IN | BODY MASS INDEX: 24.17 KG/M2 | WEIGHT: 154 LBS

## 2020-12-18 DIAGNOSIS — R11.2 NON-INTRACTABLE VOMITING WITH NAUSEA, UNSPECIFIED VOMITING TYPE: ICD-10-CM

## 2020-12-18 DIAGNOSIS — K59.00 CONSTIPATION, UNSPECIFIED CONSTIPATION TYPE: ICD-10-CM

## 2020-12-18 DIAGNOSIS — D89.84 IGG4-RELATED SCLEROSING DISEASE (H): ICD-10-CM

## 2020-12-18 DIAGNOSIS — K31.84 GASTROPARESIS: Primary | ICD-10-CM

## 2020-12-18 LAB
FOLATE RBC-MCNC: 582 NG/ML
HCT VFR BLD CALC: NORMAL %

## 2020-12-18 PROCEDURE — 99215 OFFICE O/P EST HI 40 MIN: CPT | Mod: 95 | Performed by: PHYSICIAN ASSISTANT

## 2020-12-18 ASSESSMENT — MIFFLIN-ST. JEOR: SCORE: 1331.17

## 2020-12-18 NOTE — PROGRESS NOTES
"Dinora Mcghee is a 55 year old female who is being evaluated via a billable video visit.      The patient has been notified of following:     \"This video visit will be conducted via a call between you and your physician/provider. We have found that certain health care needs can be provided without the need for an in-person physical exam.  This service lets us provide the care you need with a video conversation.  If a prescription is necessary we can send it directly to your pharmacy.  If lab work is needed we can place an order for that and you can then stop by our lab to have the test done at a later time.    Video visits are billed at different rates depending on your insurance coverage.  Please reach out to your insurance provider with any questions.    If during the course of the call the physician/provider feels a video visit is not appropriate, you will not be charged for this service.\"    Patient has given verbal consent for Video visit? Yes  How would you like to obtain your AVS? MyChart  If you are dropped from the video visit, the video invite should be resent to: Text to cell phone: [phone]  Will anyone else be joining your video visit? No      Video-Visit Details    Type of service:  Video Visit    Video Start Time: 2:02PM  Video End Time: 2:47 PM    Originating Location (pt. 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     1.  Abdominal pain  Patient reports worsening abdominal pain, nausea and vomiting starting a little over a month ago.  Of note, had been working on a more vegan diet around the time.  Was evaluated in the emergency department with unremarkable CT scan and lab work-up.  Recently had small bowel follow-through without evidence of stricture or obstruction.  Patient reports symptoms improved with dietary changes.  Suspect that symptoms are due to exacerbation of underlying gastroparesis.    Discussed that mainstay for gastroparesis treatment is having small frequent meals throughout the day that are low in fat low in fiber.  Patient has been trying to do this on her own.  I have reached out to our dietitian to schedule visit, will have our schedulers follow-up with the patient.  Should also continue to treat her underlying constipation aggressively.  Should continue Amitiza and max dose of magnesium oxide at this point.  Patient reports that she has had a poor response to MiraLAX in the past, will avoid at this time.  Future considerations include Motegrity which may act as a promotility agent in her colon and could also improve gastric emptying.    Of note, patient's recent labs were mildly elevated with ALT and AST.  When she was in the emergency department, these were within normal limits.  Unclear what caused this elevation.  We will continue to monitor with consideration of hepatology referral pending clinical picture.    --Continue Amitiza and max dose magnesium oxide  --Continue good water intake, exercise  --You can read about Motegrity, this might be a future option if your symptoms persist  --Meet with our GI dietitian.  I will have our schedulers call you.  Please let us know if you do not hear from them soon.  Our GI dietitians names are Graciela  --Let us know if symptoms change or  [facility]    RTC 9-12 months     Thank you for this consultation. It was a pleasure to participate in the care of this patient; please contact us with any further questions. I spent a total of 45 minutes face-to-face (video) with Dinora Mcghee during today's office visit.  Over 50% of which was counseling/coordinating care regarding the above delineated issues. Please see note for details.     Note completed using voice recognition software. Some word and grammatical errors may occur.    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     Summarized from previous documentation with Dr. Genao and I, please see previous notes for further details     Gastroparesis  Gastric emptying study with 2-hour study at [facility] have been consistent with gastroparesis, patient had follow-up study here 6/27/2016 with 49% remaining at 4 hours however the patient was not aware she was on narcotics at the time.  Patient had previous GJ tubes in fall 2016, NG tube used for venting with tube feeds and was able to wean off of tube feeds after TPA T.  At initial visit she reported severe migraine headaches associated with vomiting and is on amitriptyline for migraine prophylaxis.  Patient has used several medication for the symptoms including scopolamine patch, pro-chloro para Rufino, promethazine which helped symptoms.  She has also been seen by neurology and psychology due to concern that she has a difficult time sleeping and with migraines.  At her last visit,  patient had been working with dietition to increase caloric intake and was maintaining weight around 150 lbs. She reported occasional nausea without vomiting, with 1 episode in the last month. Symptoms may have been worsened due to anxiety regarding surgery and ativan had helped.  At the patient's last visit:  the patient reports that since taking the Phenergan at night, her nausea and vomiting has improved significantly and she is not waking up as often.  She does report an episode around the holidays in which he vomited 5-6 times however thinks this may be due to a GI bug.  Now she reports that she has nausea about 2 times a week depending on the week.  Is also taking Compazine as needed and no longer taking Zofran.  She also understands that blood sugar may play a role in her nausea therefore she will check her blood sugar when nauseous.    Last year: The patient notes improvement in her symptoms if she waits to eat breakfast until 10-11 in the morning. She continues to take compazine in the morning, and phenergan at night. Still will experience vomiting, especially if she does not take phenergan at night. Nausea/vomiting worsens with migraines which have improved while avoiding gluten and increasing fruits and vegetables (2 days a month instead of 4 times a week). Also has cut back on meat and aims to eat more chicken and fish.     Constipation/irregular bowel movements/bloating  Patient reports history of ongoing constipation for over 20 years after having her first child.  She has tried multiple interventions including a bowel cleanout, MiraLAX, senna, milk of magnesium, Linzess, and Amitiza for which she was on when she first came to [facility] and  GI clinic.  Patient also takes creon.  Patient has done a course of rifaximin in the past with with improvement in bloating and stool consistency.  Patient has also been on specific SIBO/FODMAP diets to the [facility] while avoiding certain food triggers. At the  "patient's last visit: the patient notes that she continues to have occasional watery stools which she describes occurs on \"clearing days \".  This will happen every couple of days in which she describes urgent bowel movements with a large amount of stool requiring multiple trips to the bathroom.  When this occurs, she feels as though it has drained her energy.  Will also have abdominal discomfort associated with this which is slightly alleviated with a heating pad.  On other days, she will have a more solid bowel movement which she describes as a coil.  She plans to reschedule around having a bowel movement and her daily routine that involves exercise, drinking hot water, and guided imagery in order to have 1-2 satisfying bowel movements.  Continues to follow low FODMAP diet.  She continues to take amities of 5 tablets a day, occasional senna, and daily Colace. On average, the patient will take 12-15 creon tablets a day.     Last year she reports she typically has 2 bowel movements a day and denies significant constipation. Has cut out Senna, and is taking colace, Amitiza (5 tablets a day), magnesium oxide 200 mg. Is also taking Creon 12-15 tablets daily. Will have 1 episode of diarrhea a week which is stable from before- when this happens she will have very loose stools and urgency. Also notes increased bloating when consuming gluten.     GERD, Dysphagia   Summarized/taken from prior documentation   Patient experiences GERD as substernal and throat burning with nocturnal relaxant causing choking. She had previously followed with Dr. George Flores in out clinic and reports a prior Schatzki's ring requiring previous dilation. Manometry was noral, and pH impedence had a DeMeester of 24 with positive symptoms association. Patient has treated GERD symptoms with BID PPI, Carafate, H2 blocker, and tums. At her last visit, she had been experiencing heartburn and has restarted omeprazole 40 mg daily with continued symptoms " with specific food triggers. At the patient's last visit, she was instructed to start zantac 300 mg a day.     Last year she notes that since adding zantac, GERD has been well controlled. Is also taking daily omeprazole. Sometimes will note a deep/hoarse voice, this happens in the morning.     Interval History 12/18/2020:  Patient reports that she had been feeling pretty well up until early November She had been working on being a vegetarian.  Early November the patient noted worsening symptoms.  He was seen at the [facility] emergency department on November 18.  At that time, blood work notable for normal BMP, low lipase, unremarkable hepatic panel.  CBC without elevation of WBCs though she did have elevated platelets, neutrophils and lymphocytes.  CT at that time with moderate stool burden, nonspecific fluid in small intestine.  No evidence of obstruction per documentation. Had similar episodes after this.     Today she reports that she continues to have pain on her right side radiating to her back.  This has improved with juicing foods.  States that she has 6-7 small meals a day with about a cup of food at each meal.  States that foods such as toast, bread, gluten seem to go better than healthier foods.  Is having a difficult time balancing foods that she enjoys eating, foods that cause symptoms and foods that are healthy.  Will have nausea vomiting and pain with food triggers.  Is using as needed Phenergan. she also reports some nausea when bending down, and discomfort when laying on her belly.  Does report she has had increased eructation, feels like she can feel and watch food move throughout her abdomen.  Does not have foul-smelling gas that she has had in the past with SIBO.    Is currently taking max dose magnesium oxide in addition to Amitiza.  With this she is having regular bowel movements that are urgent.  Today she had 4 bowel movements.  Said they are formed, long, no melena or hematochezia.   Continues to take Creon.  Has reduced the dose to about 1 per small meal.    Patient reports he has had increased stress recently with the help of her son.  He does exercise regularly, meditates, and is established with a therapist.    ROS:    Has lost some weight recently, was previously 158 pounds now she is at 154    PROBLEM LIST  Patient Active Problem List    Diagnosis Date Noted     Iron deficiency anemia 03/22/2019     Priority: Medium     Headache, chronic migraine without aura 09/18/2018     Priority: Medium     Chronic pain syndrome 09/18/2018     Priority: Medium     S/P hernia repair 08/23/2018     Priority: Medium     Incisional hernia, without obstruction or gangrene 05/20/2018     Priority: Medium     Adhesive capsulitis of shoulder, unspecified laterality 11/14/2017     Priority: Medium     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  Document previously scanned on 1/12/17.  Validation form completed and scanned.  Code Status reflects choices in most recent ACP document..  Confirmed/documented designated decision maker(s).  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 Provider to review and confirm.       GERD (gastroesophageal reflux disease) 12/01/2010     Priority: Medium     Lumbago 04/18/2005     Priority: Medium     History of L5-S1  degenerative disk disease.         Sensorineural hearing loss 01/10/2005     Priority: Medium     Problem list name updated by automated process. Provider to review       Vertiginous syndrome and labyrinthine disorder 01/10/2005     Priority: Medium     Problem list name updated by automated process. Provider to review  IMO Regulatory Load OCT 2020       Sprain and strain of other specified sites of hip and thigh 2002     Priority: Medium     Chondromalacia of patella 2002     Priority: Medium     Need for prophylactic immunotherapy      Priority: Medium     trees, grass, srw, dust mites, cat       Allergic rhinitis due to other allergen 2001     Priority: Medium     Hypothyroidism      Priority: Medium     Problem list name updated by automated process. Provider to review         PERTINENT PAST MEDICAL HISTORY:  Past Medical History:   Diagnosis Date     Allergic rhinitis, cause unspecified      Allergy to other foods     strawberries, apples, celeries, alcie, watermelon     Arthritis     left knee     Choledocholithiasis     long after cholecystectomy     Chronic abdominal pain      Chronic constipation      Chronic nausea      Chronic pancreatitis (H)      Degeneration of lumbar or lumbosacral intervertebral disc      Esophageal reflux     w/ hiatal hernia     Gastroparesis      Hiatal hernia      History of pituitary adenoma     s/p resection     Hypothyroidism      Migraines      Mild hyperlipidemia      On tube feeding diet     presence of GJ tube     Pancreatic disease     PD stricture, 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 endometriosis growth     APPENDECTOMY       C  DELIVERY ONLY       C  DELIVERY  ONLY      repeat c section with incidental cystotomy with repair     C EXCIS PITUITARY,TRANSNASAL/SEPTAL  1980    pituitary tumor removed for diabetes insipidus     C TOTAL ABDOM HYSTERECTOMY      w/ bilateral salpingoophorectomy       SECTION       COLONOSCOPY       ENDOSCOPIC RETROGRADE CHOLANGIOPANCREATOGRAM N/A 2015    Procedure: ENDOSCOPIC RETROGRADE CHOLANGIOPANCREATOGRAM;  Surgeon: Mandeep Park MD;  [facility]: [facility]     ENDOSCOPIC RETROGRADE CHOLANGIOPANCREATOGRAM N/A 2016    Procedure: COMBINED ENDOSCOPIC RETROGRADE CHOLANGIOPANCREATOGRAPHY, PLACE TUBE/STENT;  Surgeon: Mandeep Park MD;  [facility]     ENDOSCOPIC RETROGRADE CHOLANGIOPANCREATOGRAM N/A 3/17/2016    Procedure: COMBINED ENDOSCOPIC RETROGRADE CHOLANGIOPANCREATOGRAPHY, REMOVE FOREIGN BODY OR STENT/TUBE;  Surgeon: Mandeep Park MD;  [facility]: [facility]     ENDOSCOPIC RETROGRADE CHOLANGIOPANCREATOGRAM N/A 2016    Procedure: COMBINED ENDOSCOPIC RETROGRADE CHOLANGIOPANCREATOGRAPHY, PLACE TUBE/STENT;  Surgeon: Mandeep Park MD;  [facility]     ENDOSCOPIC RETROGRADE CHOLANGIOPANCREATOGRAM N/A 2016    Procedure: COMBINED ENDOSCOPIC RETROGRADE CHOLANGIOPANCREATOGRAPHY, REMOVE FOREIGN BODY OR STENT/TUBE;  Surgeon: Mandeep Park MD;  [facility]     ENDOSCOPIC ULTRASOUND UPPER GASTROINTESTINAL TRACT (GI) N/A 10/3/2016    Procedure: ENDOSCOPIC ULTRASOUND, ESOPHAGOSCOPY / UPPER GASTROINTESTINAL TRACT (GI);  Surgeon: Guru Jose Rodas MD;  [facility]     ESOPHAGOSCOPY, GASTROSCOPY, DUODENOSCOPY (EGD), COMBINED N/A 2015    Procedure: COMBINED ESOPHAGOSCOPY, GASTROSCOPY, DUODENOSCOPY (EGD), REMOVE FOREIGN BODY;  Surgeon: Mandeep Park MD;  [facility]     ESOPHAGOSCOPY, GASTROSCOPY, DUODENOSCOPY (EGD), COMBINED N/A 10/25/2015    Procedure: COMBINED ESOPHAGOSCOPY, GASTROSCOPY, DUODENOSCOPY (EGD);  Surgeon: Sammy Amaro,  MD;  [facility]     ESOPHAGOSCOPY, GASTROSCOPY, DUODENOSCOPY (EGD), COMBINED N/A 10/25/2015    Procedure: COMBINED ESOPHAGOSCOPY, GASTROSCOPY, DUODENOSCOPY (EGD), BIOPSY SINGLE OR MULTIPLE;  Surgeon: Sammy Amaro MD;  Location: [facility]     ESOPHAGOSCOPY, GASTROSCOPY, DUODENOSCOPY (EGD), DILATATION, COMBINED       EXCISE LESION TRUNK N/A 4/17/2017    Procedure: EXCISE LESION TRUNK;  Removal of Abdominal Foreign Body;  Surgeon: Nestor Phoenix MD;  [facility]     HC ESOPH/GAS REFLUX TEST W NASAL IMPED >1 HR N/A 11/19/2015    Procedure: ESOPHAGEAL IMPEDENCE FUNCTION TEST WITH 24 HOUR PH GREATER THAN 1 HOUR;  Surgeon: Thiago Apple MD;  Location: [facility] GI     HC UGI ENDOSCOPY DIAG W BIOPSY  9/17/08     HC UGI ENDOSCOPY DIAG W BIOPSY  9/27/12     HC UGI ENDOSCOPY W ESOPHAGEAL DILATION BALLOON <30MM  9/17/08     HC UGI ENDOSCOPY W EUS N/A 5/5/2015    Procedure: COMBINED ENDOSCOPIC ULTRASOUND, ESOPHAGOSCOPY, GASTROSCOPY, DUODENOSCOPY (EGD);  Surgeon: Wm Dueñas MD;  Location: [facility]     HC WRIST ARTHROSCOP,RELEASE XVERS LIG Bilateral 12/17/08     INJECT TRANSVERSUS ABDOMINIS PLANE (TAP) BLOCK BILATERAL Left 9/22/2016    Procedure: INJECT TRANSVERSUS ABDOMINIS PLANE (TAP) BLOCK BILATERAL;  Surgeon: Dickson Corrigan MD;  [facility]     laparoscopic pineda  1995     LAPAROSCOPIC HERNIORRHAPHY INCISIONAL N/A 8/23/2018    Procedure: LAPAROSCOPIC HERNIORRHAPHY INCISIONAL;  Laparoscopic Incisional Hernia Repair with Symbotex Mesh Implant;  Surgeon: Nestor Phoenix MD;  [facility]     LAPAROSCOPIC PANCREATECTOMY, TRANSPLANT AUTO ISLET CELL N/A 12/28/2016    Procedure: LAPAROSCOPIC PANCREATECTOMY, TRANSPLANT AUTO ISLET CELL;  Surgeon: Nestor Phoenix MD;  Location: [facility]     transphenoidal pituitary resection  1990       ALLERGIES:  Allergies   Allergen Reactions     Apple Anaphylaxis     Corticosteroids Other (See Comments)     All oral, IV and injectable steroids are contraindicated (unless  in life threatening situations) in [facility] transplant recipients. They can cause irreversible loss of islet cell function. Please contact patient's transplant care coordinator ADI Gaffney RN at [phone]/pager [phone] and/or endocrinologist prior to administration.       Depakote [Divalproex Sodium] Other (See Comments)     Chest pain     Zithromax [Azithromycin Dihydrate] Anaphylaxis     Noted in 4/7/08 ER     Darvocet [Propoxyphene N-Apap] Itching     Morphine Nausea and Vomiting and Rash     Nalbuphine Hcl Rash     RASH :nubaine     Zosyn [Piperacillin-Tazobactam In D5w] Rash     Possible allergy, did have a diffuse rash that seemed drug related but could have also been related to soap in the hospital.      Bactrim [Sulfamethoxazole W-Trimethoprim] Other (See Comments) and Nausea and Vomiting     Severely low liver function.     Cats      Compazine [Prochlorperazine] Other (See Comments)     Twitching. Takes Benedryl and is fine     Dust Mites      Grass      Prednisone Other (See Comments)     Insomnia       Ragweeds      Tape [Adhesive Tape] Blisters     Tramadol      Trees      Zofran [Ondansetron] Other (See Comments)     migraine     Flagyl [Metronidazole] Hives and Rash       PERTINENT MEDICATIONS:    Current Outpatient Medications:      acetaminophen 500 MG CAPS, Take 2 mg by mouth 2 times daily, Disp: , Rfl:      amitriptyline (ELAVIL) 10 MG tablet, Take 5 tablets (50 mg) by mouth At Bedtime Take 40 mg at bedtime, Disp: 150 tablet, Rfl: 11     amylase-lipase-protease (CREON 24) 08553-75768 units CPEP per EC capsule, Take 3-4 capsules by mouth with meals and 1-2 with snacks. Maximum 15 capsules per day., Disp: 450 capsule, Rfl: 11     aspirin 81 MG EC tablet, Take 1 tablet (81 mg) by mouth daily, Disp: 90 tablet, Rfl: 3     azelastine (ASTELIN) 0.1 % nasal spray, Spray 1 spray into both nostrils 2 times daily, Disp: 1 Bottle, Rfl: 11     blood glucose (PAT CONTOUR NEXT) test strip, Use to  test blood sugar 6 times daily or as directed., Disp: 2 Box, Rfl: 11     blood glucose monitoring (PAT MICROLET) lancets, Use to test blood sugar 6-8 times daily or as directed., Disp: 100 each, Rfl: 11     calcium carbonate-vitamin D (CALCIUM 500/D) 500-200 MG-UNIT tablet, , Disp: , Rfl:      cetirizine (ZYRTEC) 10 MG tablet, Take 1 tablet (10 mg) by mouth daily, Disp: 90 tablet, Rfl: 3     diphenhydrAMINE (BENADRYL ALLERGY) 25 MG capsule, Take 1 capsule (25 mg) by mouth every 6 hours as needed for itching or allergies, Disp: 56 capsule, Rfl: 0     docusate sodium (COLACE) 100 MG capsule, Take 1 capsule (100 mg) by mouth 2 times daily as needed for constipation,, Disp: 60 capsule, Rfl: 0     estradiol (VAGIFEM) 10 MCG TABS vaginal tablet, Place 1 tablet (10 mcg) vaginally twice a week, Disp: 24 tablet, Rfl: 3     famotidine (PEPCID) 20 MG tablet, Take 1 tablet (20 mg) by mouth 2 times daily, Disp: 90 tablet, Rfl: 3     fish oil-omega-3 fatty acids 1000 MG capsule, , Disp: , Rfl:      FOLIC ACID PO, Take 1 mg by mouth daily, Disp: , Rfl:      glucose 40 % GEL gel, Take 15-30 g by mouth every 15 minutes as needed for low blood sugar, Disp: 3 Tube, Rfl: 2     ibuprofen (ADVIL/MOTRIN) 400 MG tablet, Take 1 tablet (400 mg) by mouth every 6 hours as needed for moderate pain, Disp: 30 tablet, Rfl: 0     levothyroxine (SYNTHROID) 137 MCG tablet, Take 1 tablet (137 mcg) by mouth daily, Disp: 90 tablet, Rfl: 3     lubiprostone (AMITIZA) 8 MCG capsule, Take 3 capsules in AM and 2 capsules in PM  More refills after virtual visit with Ms Figueroa, Disp: 150 capsule, Rfl: 11     magnesium oxide 200 MG TABS, , Disp: , Rfl:      montelukast (SINGULAIR) 10 MG tablet, TAKE 1 TABLET(10 MG) BY MOUTH AT BEDTIME, Disp: 90 tablet, Rfl: 3     multivitamin CF formula (CHOICEFUL) capsule, Take 1 tablet by mouth daily, Disp: , Rfl: 11     Nutritional Supplements (BOOST HIGH PROTEIN) LIQD, After above baseline labs are drawn, give: 6 mL/kg to  maximum of 360 mL; the beverage is to be consumed within 5 minutes., Disp: , Rfl: 0     omeprazole (PRILOSEC) 40 MG DR capsule, Take 1 capsule (40 mg) by mouth 2 times daily, Disp: 180 capsule, Rfl: 3     order for DME, Equipment being ordered:Cefaly, Disp: 1 Device, Rfl: 0     prochlorperazine (COMPAZINE) 5 MG tablet, Take two 5 mg tablets by mouth every six hours as needed for nausea., Disp: 90 tablet, Rfl: 3     promethazine (PHENERGAN) 25 MG tablet, Take 1 tablet (25 mg) by mouth At Bedtime, Disp: 90 tablet, Rfl: 3     senna-docusate (SENOKOT-S;PERICOLACE) 8.6-50 MG per tablet, Take 1-2 tablets by mouth 2 times daily, Disp: 100 tablet, Rfl: 0     Sharps Container (BD SHARPS ) MISC, 1 Container as needed, Disp: 1 each, Rfl: 1     ZOLMitriptan (ZOMIG) 5 MG tablet, Take 1 tablet (5 mg) by mouth at onset of headache for migraine, Disp: 9 tablet, Rfl: 3    SOCIAL HISTORY:  Social History     Socioeconomic History     Marital status:      Spouse name: Norris     Number of children: 3     Years of education: 16     Highest education level: Not on file   Occupational History     Occupation: director     Employer: [employer]   Social Needs     Financial resource strain: Not on file     Food insecurity     Worry: Not on file     Inability: Not on file     Transportation needs     Medical: Not on file     Non-medical: Not on file   Tobacco Use     Smoking status: Former Smoker     Packs/day: 0.50     Years: 6.00     Pack years: 3.00     Types: Cigarettes     Start date: 1985     Quit date: 1992     Years since quittin.9     Smokeless tobacco: Never Used     Tobacco comment: no 2nd hand   Substance and Sexual Activity     Alcohol use: Yes     Alcohol/week: 3.0 - 6.0 standard drinks     Types: 1 - 2 Glasses of wine, 1 - 2 Cans of beer, 1 - 2 Shots of liquor per week     Comment: occasional     Drug use: No     Sexual activity: Yes     Partners: Male     Birth control/protection: None     Comment:  Hystectomy 1999   Lifestyle     Physical activity     Days per week: Not on file     Minutes per session: Not on file     Stress: Not on file   Relationships     Social connections     Talks on phone: Not on file     Gets together: Not on file     Attends Evangelical service: Not on file     Active member of club or organization: Not on file     Attends meetings of clubs or organizations: Not on file     Relationship status: Not on file     Intimate partner violence     Fear of current or ex partner: Not on file     Emotionally abused: Not on file     Physically abused: Not on file     Forced sexual activity: Not on file   Other Topics Concern     Parent/sibling w/ CABG, MI or angioplasty before 65F 55M? No      Service Not Asked     Blood Transfusions No     Caffeine Concern Not Asked     Occupational Exposure Not Asked     Hobby Hazards Not Asked     Sleep Concern Not Asked     Stress Concern Not Asked     Weight Concern Not Asked     Special Diet Not Asked     Back Care Not Asked     Exercise Not Asked     Bike Helmet Not Asked     Seat Belt Yes     Self-Exams Not Asked   Social History Narrative     with 3 children and a dog.  No smoking, etoh or drug use.  Worked as a  for [employer] in [place] in the past.  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       FAMILY HISTORY:  Family History   Problem Relation Age of Onset     Eye Disorder Father         cataract, detached retina     Myocardial Infarction Father 60     Lipids Father      Cerebrovascular Disease Father      Depression Father      Substance Abuse Father      Anesthesia Reaction Father         stroke right after surgery     Cataracts Father      Osteoarthritis Father      Ulcerative Colitis Father      Lipids Mother      Hypertension Mother      Thyroid Disease Mother      Depression Mother      Angina Mother      GERD Mother      Skin Cancer Mother      Eye Disorder Son         ptosis      Eye Disorder Paternal Grandmother         cataract     Eye Disorder Paternal Grandfather         cataract     Diabetes Paternal Grandfather      Eye Disorder Maternal Grandmother         cataract     Thyroid Disease Maternal Grandmother      Coronary Artery Disease Sister         angioplasty     GERD Sister      Substance Abuse Sister      Depression Sister      Depression Son      Anxiety Disorder Son      Thyroid Disease Sister      Diabetes Maternal Grandfather      Depression Nephew      Anxiety Disorder Nephew      Thyroid Disease Nephew      Diabetes Type 2  Cousin         paternal cousin     Heart Disease Paternal Aunt      Diabetes Paternal Aunt      Diabetes Paternal Uncle      Heart Disease Paternal Uncle      Past/family/social history reviewed and no changes    PHYSICAL EXAMINATION:  General appearance: Healthy appearing adult, in no acute distress  Eyes: Sclera anicteric, Pupils round and reactive to light  Ears, nose, mouth and throat: No obvious external lesions of ears and nose, Hearing intact  Neck: Symmetric, No obvious external lesions  Respiratory: Normal respiration, no use of accessory muscles   Skin: No rashes or jaundice   Psychiatric: Oriented to person, place and time, Appropriate mood and affect.       PERTINENT STUDIES:  Orders Only on 09/13/2018   Component Date Value Ref Range Status     WBC 09/13/2018 5.8  4.0 - 11.0 10e9/L Final     RBC Count 09/13/2018 4.51  3.8 - 5.2 10e12/L Final     Hemoglobin 09/13/2018 14.1  11.7 - 15.7 g/dL Final     Hematocrit 09/13/2018 42.1  35.0 - 47.0 % Final     MCV 09/13/2018 93  78 - 100 fl Final     MCH 09/13/2018 31.3  26.5 - 33.0 pg Final     MCHC 09/13/2018 33.5  31.5 - 36.5 g/dL Final     RDW 09/13/2018 13.2  10.0 - 15.0 % Final     Platelet Count 09/13/2018 430  150 - 450 10e9/L Final     Diff Method 09/13/2018 Automated Method   Final     % Neutrophils 09/13/2018 37.1  % Final     % Lymphocytes 09/13/2018 40.5  % Final     % Monocytes 09/13/2018  10.5  % Final     % Eosinophils 09/13/2018 9.8  % Final     % Basophils 09/13/2018 2.1  % Final     % Immature Granulocytes 09/13/2018 0.0  % Final     Nucleated RBCs 09/13/2018 0  0 /100 Final     Absolute Neutrophil 09/13/2018 2.2  1.6 - 8.3 10e9/L Final     Absolute Lymphocytes 09/13/2018 2.4  0.8 - 5.3 10e9/L Final     Absolute Monocytes 09/13/2018 0.6  0.0 - 1.3 10e9/L Final     Absolute Eosinophils 09/13/2018 0.6  0.0 - 0.7 10e9/L Final     Absolute Basophils 09/13/2018 0.1  0.0 - 0.2 10e9/L Final     Abs Immature Granulocytes 09/13/2018 0.0  0 - 0.4 10e9/L Final     Absolute Nucleated RBC 09/13/2018 0.0   Final     Sodium 09/13/2018 138  133 - 144 mmol/L Final     Potassium 09/13/2018 4.3  3.4 - 5.3 mmol/L Final     Chloride 09/13/2018 102  94 - 109 mmol/L Final     Carbon Dioxide 09/13/2018 30  20 - 32 mmol/L Final     Anion Gap 09/13/2018 5  3 - 14 mmol/L Final     Glucose 09/13/2018 119* 70 - 99 mg/dL Final     Urea Nitrogen 09/13/2018 12  7 - 30 mg/dL Final     Creatinine 09/13/2018 0.83  0.52 - 1.04 mg/dL Final     GFR Estimate 09/13/2018 72  >60 mL/min/1.7m2 Final     GFR Estimate If Black 09/13/2018 87  >60 mL/min/1.7m2 Final     Calcium 09/13/2018 9.2  8.5 - 10.1 mg/dL Final     Bilirubin Direct 09/13/2018 0.2  0.0 - 0.2 mg/dL Final     Bilirubin Total 09/13/2018 0.8  0.2 - 1.3 mg/dL Final     Albumin 09/13/2018 3.9  3.4 - 5.0 g/dL Final     Protein Total 09/13/2018 7.8  6.8 - 8.8 g/dL Final     Alkaline Phosphatase 09/13/2018 145  40 - 150 U/L Final     ALT 09/13/2018 59* 0 - 50 U/L Final     AST 09/13/2018 39  0 - 45 U/L Final     IGG 09/13/2018 1160  695 - 1620 mg/dL Final     IgG1 09/13/2018 416  300 - 856 mg/dL Final     IgG2 09/13/2018 384  158 - 761 mg/dL Final     IgG3 09/13/2018 83  24 - 192 mg/dL Final     IgG4 09/13/2018 95* 11 - 86 mg/dL Final

## 2020-12-18 NOTE — LETTER
"  12/18/2020       RE: Dinora Mcghee  [address] [facility] [address]      Dear Colleague,    Thank you for referring your patient, Dinora Mcghee, to the [facility] [place]. Please see a copy of my visit note below.    Dinora Mcghee is a 55 year old female who is being evaluated via a billable video visit.      The patient has been notified of following:     \"This video visit will be conducted via a call between you and your physician/provider. We have found that certain health care needs can be provided without the need for an in-person physical exam.  This service lets us provide the care you need with a video conversation.  If a prescription is necessary we can send it directly to your pharmacy.  If lab work is needed we can place an order for that and you can then stop by our lab to have the test done at a later time.    Video visits are billed at different rates depending on your insurance coverage.  Please reach out to your insurance provider with any questions.    If during the course of the call the physician/provider feels a video visit is not appropriate, you will not be charged for this service.\"    Patient has given verbal consent for Video visit? Yes  How would you like to obtain your AVS? MyChart  If you are dropped from the video visit, the video invite should be resent to: Text to cell phone: [phone]  Will anyone else be joining your video visit? No      Video-Visit Details    Type of service:  Video Visit    Video Start Time: 2:02PM  Video End Time: 2:47 PM    Originating Location (pt. 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     1.  Abdominal pain  Patient reports worsening abdominal pain, nausea and vomiting starting a little over a month ago.  Of note, had been working on a more vegan diet around the time.  Was evaluated in the emergency department with unremarkable CT scan and lab work-up.  Recently had small bowel follow-through without evidence of stricture or obstruction.  Patient reports symptoms improved with dietary changes.  Suspect that symptoms are due to exacerbation of underlying gastroparesis.    Discussed that mainstay for gastroparesis treatment is having small frequent meals throughout the day that are low in fat low in fiber.  Patient has been trying to do this on her own.  I have reached out to our dietitian to schedule visit, will have our schedulers follow-up with the patient.  Should also continue to treat her underlying constipation aggressively.  Should continue Amitiza and max dose of magnesium oxide at this point.  Patient reports that she has had a poor response to MiraLAX in the past, will avoid at this time.  Future considerations include Motegrity which may act as a promotility agent in her colon and could also improve gastric emptying.    Of note, patient's recent labs were mildly elevated with ALT and AST.  When she was in the emergency department, these were within normal limits.  Unclear what caused this elevation.  We will continue to monitor with consideration of hepatology referral pending clinical picture.    --Continue Amitiza and max dose magnesium oxide  --Continue good water intake, exercise  --You can read about Motegrity, this might be a future  option if your symptoms persist  --Meet with our GI dietitian.  I will have our schedulers call you.  Please let us know if you do not hear from them soon.  Our GI dietitians names are Remi and Quyen  --Let us know if symptoms change or worsen    RTC 9-12 months     Thank you for this consultation. It was a pleasure to participate in the care of this patient; please contact us with any further questions. I spent a total of 45 minutes face-to-face (video) with Dinora Mcghee during today's office visit.  Over 50% of which was counseling/coordinating care regarding the above delineated issues. Please see note for details.     Note completed using voice recognition software. Some word and grammatical errors may occur.    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     Summarized from previous documentation with Dr. Genao and I, please see previous notes for further details     Gastroparesis  Gastric emptying study with 2-hour study at [facility] have been consistent with gastroparesis, patient had follow-up study here 6/27/2016 with 49% remaining at 4 hours however the patient was not aware she was on narcotics at the time.  Patient had previous GJ tubes in fall 2016, NG tube used for venting with tube feeds and was able to wean off of tube feeds after TPA T.  At initial visit she reported severe migraine headaches associated with vomiting and is on amitriptyline for migraine prophylaxis.  Patient has used several medication for the  symptoms including scopolamine patch, pro-chloro para Rufino, promethazine which helped symptoms.  She has also been seen by neurology and psychology due to concern that she has a difficult time sleeping and with migraines.  At her last visit, patient had been working with dietition to increase caloric intake and was maintaining weight around 150 lbs. She reported occasional nausea without vomiting, with 1 episode in the last month. Symptoms may have been worsened due to anxiety regarding surgery and ativan had helped.  At the patient's last visit:  the patient reports that since taking the Phenergan at night, her nausea and vomiting has improved significantly and she is not waking up as often.  She does report an episode around the holidays in which he vomited 5-6 times however thinks this may be due to a GI bug.  Now she reports that she has nausea about 2 times a week depending on the week.  Is also taking Compazine as needed and no longer taking Zofran.  She also understands that blood sugar may play a role in her nausea therefore she will check her blood sugar when nauseous.    Last year: The patient notes improvement in her symptoms if she waits to eat breakfast until 10-11 in the morning. She continues to take compazine in the morning, and phenergan at night. Still will experience vomiting, especially if she does not take phenergan at night. Nausea/vomiting worsens with migraines which have improved while avoiding gluten and increasing fruits and vegetables (2 days a month instead of 4 times a week). Also has cut back on meat and aims to eat more chicken and fish.     Constipation/irregular bowel movements/bloating  Patient reports history of ongoing constipation for over 20 years after having her first child.  She has tried multiple interventions including a bowel cleanout, MiraLAX, senna, milk of magnesium, Linzess, and Amitiza for which she was on when she first came to [facility] and  GI clinic.  Patient also  "takes creon.  Patient has done a course of rifaximin in the past with with improvement in bloating and stool consistency.  Patient has also been on specific SIBO/FODMAP diets to the [facility] while avoiding certain food triggers. At the patient's last visit: the patient notes that she continues to have occasional watery stools which she describes occurs on \"clearing days \".  This will happen every couple of days in which she describes urgent bowel movements with a large amount of stool requiring multiple trips to the bathroom.  When this occurs, she feels as though it has drained her energy.  Will also have abdominal discomfort associated with this which is slightly alleviated with a heating pad.  On other days, she will have a more solid bowel movement which she describes as a coil.  She plans to reschedule around having a bowel movement and her daily routine that involves exercise, drinking hot water, and guided imagery in order to have 1-2 satisfying bowel movements.  Continues to follow low FODMAP diet.  She continues to take amities of 5 tablets a day, occasional senna, and daily Colace. On average, the patient will take 12-15 creon tablets a day.     Last year she reports she typically has 2 bowel movements a day and denies significant constipation. Has cut out Senna, and is taking colace, Amitiza (5 tablets a day), magnesium oxide 200 mg. Is also taking Creon 12-15 tablets daily. Will have 1 episode of diarrhea a week which is stable from before- when this happens she will have very loose stools and urgency. Also notes increased bloating when consuming gluten.     GERD, Dysphagia   Summarized/taken from prior documentation   Patient experiences GERD as substernal and throat burning with nocturnal relaxant causing choking. She had previously followed with Dr. George Flores in out clinic and reports a prior Schatzki's ring requiring previous dilation. Manometry was noral, and pH impedence had a DeMeester of 24 " with positive symptoms association. Patient has treated GERD symptoms with BID PPI, Carafate, H2 blocker, and tums. At her last visit, she had been experiencing heartburn and has restarted omeprazole 40 mg daily with continued symptoms with specific food triggers. At the patient's last visit, she was instructed to start zantac 300 mg a day.     Last year she notes that since adding zantac, GERD has been well controlled. Is also taking daily omeprazole. Sometimes will note a deep/hoarse voice, this happens in the morning.     Interval History 12/18/2020:  Patient reports that she had been feeling pretty well up until early November She had been working on being a vegetarian.  Early November the patient noted worsening symptoms.  He was seen at the [facility] emergency department on November 18.  At that time, blood work notable for normal BMP, low lipase, unremarkable hepatic panel.  CBC without elevation of WBCs though she did have elevated platelets, neutrophils and lymphocytes.  CT at that time with moderate stool burden, nonspecific fluid in small intestine.  No evidence of obstruction per documentation. Had similar episodes after this.     Today she reports that she continues to have pain on her right side radiating to her back.  This has improved with juicing foods.  States that she has 6-7 small meals a day with about a cup of food at each meal.  States that foods such as toast, bread, gluten seem to go better than healthier foods.  Is having a difficult time balancing foods that she enjoys eating, foods that cause symptoms and foods that are healthy.  Will have nausea vomiting and pain with food triggers.  Is using as needed Phenergan. she also reports some nausea when bending down, and discomfort when laying on her belly.  Does report she has had increased eructation, feels like she can feel and watch food move throughout her abdomen.  Does not have foul-smelling gas that she has had in the past  with SIBO.    Is currently taking max dose magnesium oxide in addition to Amitiza.  With this she is having regular bowel movements that are urgent.  Today she had 4 bowel movements.  Said they are formed, long, no melena or hematochezia.  Continues to take Creon.  Has reduced the dose to about 1 per small meal.    Patient reports he has had increased stress recently with the help of her son.  He does exercise regularly, meditates, and is established with a therapist.    ROS:    Has lost some weight recently, was previously 158 pounds now she is at 154    PROBLEM LIST  Patient Active Problem List    Diagnosis Date Noted     Iron deficiency anemia 03/22/2019     Priority: Medium     Headache, chronic migraine without aura 09/18/2018     Priority: Medium     Chronic pain syndrome 09/18/2018     Priority: Medium     S/P hernia repair 08/23/2018     Priority: Medium     Incisional hernia, without obstruction or gangrene 05/20/2018     Priority: Medium     Adhesive capsulitis of shoulder, unspecified laterality 11/14/2017     Priority: Medium     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  Document previously scanned on 1/12/17.  Validation form completed and scanned.  Code Status reflects choices in most recent ACP document..  Confirmed/documented designated decision maker(s).  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 Provider to review and confirm.       GERD (gastroesophageal reflux disease) 12/01/2010     Priority: Medium     Lumbago 04/18/2005     Priority: Medium     History of L5-S1 degenerative disk disease.         Sensorineural hearing loss 01/10/2005     Priority: Medium     Problem list name updated by automated process. Provider to review       Vertiginous syndrome and labyrinthine disorder 01/10/2005     Priority: Medium     Problem list name updated by automated process. Provider to review  IMO Regulatory Load OCT 2020       Sprain and strain of other specified sites of hip and thigh 12/09/2002     Priority: Medium     Chondromalacia of patella 12/09/2002     Priority: Medium     Need for prophylactic immunotherapy      Priority: Medium     trees, grass, srw, dust mites, cat       Allergic rhinitis due to other allergen 12/21/2001     Priority: Medium     Hypothyroidism      Priority: Medium     Problem list name updated by automated process. Provider to review         PERTINENT PAST MEDICAL HISTORY:  Past Medical History:   Diagnosis Date     Allergic rhinitis, cause unspecified      Allergy to other foods     strawberries, apples, celeries, alice, watermelon     Arthritis     left knee     Choledocholithiasis     long after cholecystectomy     Chronic abdominal pain      Chronic constipation      Chronic nausea      Chronic pancreatitis (H)      Degeneration of lumbar or lumbosacral intervertebral disc      Esophageal reflux     w/ hiatal hernia     Gastroparesis      Hiatal hernia      History of pituitary adenoma     s/p resection     Hypothyroidism      Migraines      Mild hyperlipidemia      On tube feeding diet     presence of GJ tube     Pancreatic disease     PD stricture, 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 endometriosis growth     APPENDECTOMY       C  DELIVERY ONLY       C  DELIVERY ONLY      repeat c section with incidental cystotomy with repair     C EXCIS PITUITARY,TRANSNASAL/SEPTAL  1980    pituitary tumor removed for diabetes insipidus     C TOTAL ABDOM HYSTERECTOMY      w/ bilateral salpingoophorectomy       SECTION       COLONOSCOPY       ENDOSCOPIC RETROGRADE CHOLANGIOPANCREATOGRAM N/A 2015    Procedure: ENDOSCOPIC RETROGRADE CHOLANGIOPANCREATOGRAM;  Surgeon: Mandeep Park MD;  Location: [facility]     ENDOSCOPIC RETROGRADE CHOLANGIOPANCREATOGRAM N/A 2016    Procedure: COMBINED ENDOSCOPIC RETROGRADE CHOLANGIOPANCREATOGRAPHY, PLACE TUBE/STENT;  Surgeon: Mandeep Park MD;  Location: [facility]     ENDOSCOPIC RETROGRADE CHOLANGIOPANCREATOGRAM N/A 3/17/2016    Procedure: COMBINED ENDOSCOPIC RETROGRADE CHOLANGIOPANCREATOGRAPHY, REMOVE FOREIGN BODY OR STENT/TUBE;  Surgeon: Mandeep Park MD;  Location: [facility]     ENDOSCOPIC RETROGRADE CHOLANGIOPANCREATOGRAM N/A 2016    Procedure: COMBINED ENDOSCOPIC RETROGRADE CHOLANGIOPANCREATOGRAPHY, PLACE TUBE/STENT;  Surgeon: Mandeep Park MD;  Location: [facility]     ENDOSCOPIC RETROGRADE CHOLANGIOPANCREATOGRAM N/A 2016    Procedure: COMBINED ENDOSCOPIC RETROGRADE CHOLANGIOPANCREATOGRAPHY, REMOVE FOREIGN BODY OR STENT/TUBE;  Surgeon: Mandeep Park MD;  Location: [facility]     ENDOSCOPIC ULTRASOUND UPPER GASTROINTESTINAL TRACT (GI) N/A 10/3/2016    Procedure: ENDOSCOPIC ULTRASOUND, ESOPHAGOSCOPY / UPPER GASTROINTESTINAL TRACT (GI);  Surgeon: Guru Jose Rodas MD;  Location: [facility]     ESOPHAGOSCOPY, GASTROSCOPY, DUODENOSCOPY (EGD), COMBINED N/A 2015    Procedure: COMBINED ESOPHAGOSCOPY, GASTROSCOPY, DUODENOSCOPY (EGD), REMOVE  FOREIGN BODY;  Surgeon: Mandeep Park MD;  Location: [facility]     ESOPHAGOSCOPY, GASTROSCOPY, DUODENOSCOPY (EGD), COMBINED N/A 10/25/2015    Procedure: COMBINED ESOPHAGOSCOPY, GASTROSCOPY, DUODENOSCOPY (EGD);  Surgeon: Sammy Amaro MD;  Location: [facility]     ESOPHAGOSCOPY, GASTROSCOPY, DUODENOSCOPY (EGD), COMBINED N/A 10/25/2015    Procedure: COMBINED ESOPHAGOSCOPY, GASTROSCOPY, DUODENOSCOPY (EGD), BIOPSY SINGLE OR MULTIPLE;  Surgeon: Sammy Amaro MD;  Location: [facility]     ESOPHAGOSCOPY, GASTROSCOPY, DUODENOSCOPY (EGD), DILATATION, COMBINED       EXCISE LESION TRUNK N/A 4/17/2017    Procedure: EXCISE LESION TRUNK;  Removal of Abdominal Foreign Body;  Surgeon: Nestor Phoenix MD;  [facility]     HC ESOPH/GAS REFLUX TEST W NASAL IMPED >1 HR N/A 11/19/2015    Procedure: ESOPHAGEAL IMPEDENCE FUNCTION TEST WITH 24 HOUR PH GREATER THAN 1 HOUR;  Surgeon: Thiago Apple MD;  Location: [facility]     HC UGI ENDOSCOPY DIAG W BIOPSY  9/17/08     HC UGI ENDOSCOPY DIAG W BIOPSY  9/27/12     HC UGI ENDOSCOPY W ESOPHAGEAL DILATION BALLOON <30MM  9/17/08     HC UGI ENDOSCOPY W EUS N/A 5/5/2015    Procedure: COMBINED ENDOSCOPIC ULTRASOUND, ESOPHAGOSCOPY, GASTROSCOPY, DUODENOSCOPY (EGD);  Surgeon: Wm Dueñas MD;  Location: [facility]     HC WRIST ARTHROSCOP,RELEASE XVERS LIG Bilateral 12/17/08     INJECT TRANSVERSUS ABDOMINIS PLANE (TAP) BLOCK BILATERAL Left 9/22/2016    Procedure: INJECT TRANSVERSUS ABDOMINIS PLANE (TAP) BLOCK BILATERAL;  Surgeon: Dickson Corrigan MD;  [facility]: [facility]     laparoscopic pineda  1995     LAPAROSCOPIC HERNIORRHAPHY INCISIONAL N/A 8/23/2018    Procedure: LAPAROSCOPIC HERNIORRHAPHY INCISIONAL;  Laparoscopic Incisional Hernia Repair with Symbotex Mesh Implant;  Surgeon: Nestor Phoenix MD;  Location: [facility]     LAPAROSCOPIC PANCREATECTOMY, TRANSPLANT AUTO ISLET CELL N/A 12/28/2016    Procedure: LAPAROSCOPIC PANCREATECTOMY, TRANSPLANT AUTO ISLET CELL;  Surgeon: Nestor Phoenix  MD;  Location: [facility]     transphenoidal pituitary resection  1990       ALLERGIES:  Allergies   Allergen Reactions     Apple Anaphylaxis     Corticosteroids Other (See Comments)     All oral, IV and injectable steroids are contraindicated (unless in life threatening situations) in [facility] transplant recipients. They can cause irreversible loss of islet cell function. Please contact patient's transplant care coordinator ADI Gaffney RN at [phone]/pager [phone] and/or endocrinologist prior to administration.       Depakote [Divalproex Sodium] Other (See Comments)     Chest pain     Zithromax [Azithromycin Dihydrate] Anaphylaxis     Noted in 4/7/08 ER     Darvocet [Propoxyphene N-Apap] Itching     Morphine Nausea and Vomiting and Rash     Nalbuphine Hcl Rash     RASH :nubaine     Zosyn [Piperacillin-Tazobactam In D5w] Rash     Possible allergy, did have a diffuse rash that seemed drug related but could have also been related to soap in the hospital.      Bactrim [Sulfamethoxazole W-Trimethoprim] Other (See Comments) and Nausea and Vomiting     Severely low liver function.     Cats      Compazine [Prochlorperazine] Other (See Comments)     Twitching. Takes Benedryl and is fine     Dust Mites      Grass      Prednisone Other (See Comments)     Insomnia       Ragweeds      Tape [Adhesive Tape] Blisters     Tramadol      Trees      Zofran [Ondansetron] Other (See Comments)     migraine     Flagyl [Metronidazole] Hives and Rash       PERTINENT MEDICATIONS:    Current Outpatient Medications:      acetaminophen 500 MG CAPS, Take 2 mg by mouth 2 times daily, Disp: , Rfl:      amitriptyline (ELAVIL) 10 MG tablet, Take 5 tablets (50 mg) by mouth At Bedtime Take 40 mg at bedtime, Disp: 150 tablet, Rfl: 11     amylase-lipase-protease (CREON 24) 83029-08991 units CPEP per EC capsule, Take 3-4 capsules by mouth with meals and 1-2 with snacks. Maximum 15 capsules per day., Disp: 450 capsule, Rfl: 11     aspirin 81 MG  EC tablet, Take 1 tablet (81 mg) by mouth daily, Disp: 90 tablet, Rfl: 3     azelastine (ASTELIN) 0.1 % nasal spray, Spray 1 spray into both nostrils 2 times daily, Disp: 1 Bottle, Rfl: 11     blood glucose (PAT CONTOUR NEXT) test strip, Use to test blood sugar 6 times daily or as directed., Disp: 2 Box, Rfl: 11     blood glucose monitoring (PAT MICROLET) lancets, Use to test blood sugar 6-8 times daily or as directed., Disp: 100 each, Rfl: 11     calcium carbonate-vitamin D (CALCIUM 500/D) 500-200 MG-UNIT tablet, , Disp: , Rfl:      cetirizine (ZYRTEC) 10 MG tablet, Take 1 tablet (10 mg) by mouth daily, Disp: 90 tablet, Rfl: 3     diphenhydrAMINE (BENADRYL ALLERGY) 25 MG capsule, Take 1 capsule (25 mg) by mouth every 6 hours as needed for itching or allergies, Disp: 56 capsule, Rfl: 0     docusate sodium (COLACE) 100 MG capsule, Take 1 capsule (100 mg) by mouth 2 times daily as needed for constipation,, Disp: 60 capsule, Rfl: 0     estradiol (VAGIFEM) 10 MCG TABS vaginal tablet, Place 1 tablet (10 mcg) vaginally twice a week, Disp: 24 tablet, Rfl: 3     famotidine (PEPCID) 20 MG tablet, Take 1 tablet (20 mg) by mouth 2 times daily, Disp: 90 tablet, Rfl: 3     fish oil-omega-3 fatty acids 1000 MG capsule, , Disp: , Rfl:      FOLIC ACID PO, Take 1 mg by mouth daily, Disp: , Rfl:      glucose 40 % GEL gel, Take 15-30 g by mouth every 15 minutes as needed for low blood sugar, Disp: 3 Tube, Rfl: 2     ibuprofen (ADVIL/MOTRIN) 400 MG tablet, Take 1 tablet (400 mg) by mouth every 6 hours as needed for moderate pain, Disp: 30 tablet, Rfl: 0     levothyroxine (SYNTHROID) 137 MCG tablet, Take 1 tablet (137 mcg) by mouth daily, Disp: 90 tablet, Rfl: 3     lubiprostone (AMITIZA) 8 MCG capsule, Take 3 capsules in AM and 2 capsules in PM  More refills after virtual visit with Ms Marti, Disp: 150 capsule, Rfl: 11     magnesium oxide 200 MG TABS, , Disp: , Rfl:      montelukast (SINGULAIR) 10 MG tablet, TAKE 1 TABLET(10 MG)  BY MOUTH AT BEDTIME, Disp: 90 tablet, Rfl: 3     multivitamin CF formula (CHOICEFUL) capsule, Take 1 tablet by mouth daily, Disp: , Rfl: 11     Nutritional Supplements (BOOST HIGH PROTEIN) LIQD, After above baseline labs are drawn, give: 6 mL/kg to maximum of 360 mL; the beverage is to be consumed within 5 minutes., Disp: , Rfl: 0     omeprazole (PRILOSEC) 40 MG DR capsule, Take 1 capsule (40 mg) by mouth 2 times daily, Disp: 180 capsule, Rfl: 3     order for DME, Equipment being ordered:Cefaly, Disp: 1 Device, Rfl: 0     prochlorperazine (COMPAZINE) 5 MG tablet, Take two 5 mg tablets by mouth every six hours as needed for nausea., Disp: 90 tablet, Rfl: 3     promethazine (PHENERGAN) 25 MG tablet, Take 1 tablet (25 mg) by mouth At Bedtime, Disp: 90 tablet, Rfl: 3     senna-docusate (SENOKOT-S;PERICOLACE) 8.6-50 MG per tablet, Take 1-2 tablets by mouth 2 times daily, Disp: 100 tablet, Rfl: 0     Sharps Container (BD SHARPS ) MISC, 1 Container as needed, Disp: 1 each, Rfl: 1     ZOLMitriptan (ZOMIG) 5 MG tablet, Take 1 tablet (5 mg) by mouth at onset of headache for migraine, Disp: 9 tablet, Rfl: 3    SOCIAL HISTORY:  Social History     Socioeconomic History     Marital status:      Spouse name: Norris     Number of children: 3     Years of education: 16     Highest education level: Not on file   Occupational History     Occupation: director     Employer: [employer]   Social Needs     Financial resource strain: Not on file     Food insecurity     Worry: Not on file     Inability: Not on file     Transportation needs     Medical: Not on file     Non-medical: Not on file   Tobacco Use     Smoking status: Former Smoker     Packs/day: 0.50     Years: 6.00     Pack years: 3.00     Types: Cigarettes     Start date: 1985     Quit date: 1992     Years since quittin.9     Smokeless tobacco: Never Used     Tobacco comment: no 2nd hand   Substance and Sexual Activity     Alcohol use: Yes     Alcohol/week:  3.0 - 6.0 standard drinks     Types: 1 - 2 Glasses of wine, 1 - 2 Cans of beer, 1 - 2 Shots of liquor per week     Comment: occasional     Drug use: No     Sexual activity: Yes     Partners: Male     Birth control/protection: None     Comment: Hystectomy 1999   Lifestyle     Physical activity     Days per week: Not on file     Minutes per session: Not on file     Stress: Not on file   Relationships     Social connections     Talks on phone: Not on file     Gets together: Not on file     Attends Buddhist service: Not on file     Active member of club or organization: Not on file     Attends meetings of clubs or organizations: Not on file     Relationship status: Not on file     Intimate partner violence     Fear of current or ex partner: Not on file     Emotionally abused: Not on file     Physically abused: Not on file     Forced sexual activity: Not on file   Other Topics Concern     Parent/sibling w/ CABG, MI or angioplasty before 65F 55M? No      Service Not Asked     Blood Transfusions No     Caffeine Concern Not Asked     Occupational Exposure Not Asked     Hobby Hazards Not Asked     Sleep Concern Not Asked     Stress Concern Not Asked     Weight Concern Not Asked     Special Diet Not Asked     Back Care Not Asked     Exercise Not Asked     Bike Helmet Not Asked     Seat Belt Yes     Self-Exams Not Asked   Social History Narrative     with 3 children and a dog.  No smoking, etoh or drug use.  Worked as a  for [employer] in [employer] in the past.  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       FAMILY HISTORY:  Family History   Problem Relation Age of Onset     Eye Disorder Father         cataract, detached retina     Myocardial Infarction Father 60     Lipids Father      Cerebrovascular Disease Father      Depression Father      Substance Abuse Father      Anesthesia Reaction Father         stroke right after surgery     Cataracts Father       Osteoarthritis Father      Ulcerative Colitis Father      Lipids Mother      Hypertension Mother      Thyroid Disease Mother      Depression Mother      Angina Mother      GERD Mother      Skin Cancer Mother      Eye Disorder Son         ptosis     Eye Disorder Paternal Grandmother         cataract     Eye Disorder Paternal Grandfather         cataract     Diabetes Paternal Grandfather      Eye Disorder Maternal Grandmother         cataract     Thyroid Disease Maternal Grandmother      Coronary Artery Disease Sister         angioplasty     GERD Sister      Substance Abuse Sister      Depression Sister      Depression Son      Anxiety Disorder Son      Thyroid Disease Sister      Diabetes Maternal Grandfather      Depression Nephew      Anxiety Disorder Nephew      Thyroid Disease Nephew      Diabetes Type 2  Cousin         paternal cousin     Heart Disease Paternal Aunt      Diabetes Paternal Aunt      Diabetes Paternal Uncle      Heart Disease Paternal Uncle      Past/family/social history reviewed and no changes    PHYSICAL EXAMINATION:  General appearance: Healthy appearing adult, in no acute distress  Eyes: Sclera anicteric, Pupils round and reactive to light  Ears, nose, mouth and throat: No obvious external lesions of ears and nose, Hearing intact  Neck: Symmetric, No obvious external lesions  Respiratory: Normal respiration, no use of accessory muscles   Skin: No rashes or jaundice   Psychiatric: Oriented to person, place and time, Appropriate mood and affect.       PERTINENT STUDIES:  Orders Only on 09/13/2018   Component Date Value Ref Range Status     WBC 09/13/2018 5.8  4.0 - 11.0 10e9/L Final     RBC Count 09/13/2018 4.51  3.8 - 5.2 10e12/L Final     Hemoglobin 09/13/2018 14.1  11.7 - 15.7 g/dL Final     Hematocrit 09/13/2018 42.1  35.0 - 47.0 % Final     MCV 09/13/2018 93  78 - 100 fl Final     MCH 09/13/2018 31.3  26.5 - 33.0 pg Final     MCHC 09/13/2018 33.5  31.5 - 36.5 g/dL Final     RDW 09/13/2018  13.2  10.0 - 15.0 % Final     Platelet Count 09/13/2018 430  150 - 450 10e9/L Final     Diff Method 09/13/2018 Automated Method   Final     % Neutrophils 09/13/2018 37.1  % Final     % Lymphocytes 09/13/2018 40.5  % Final     % Monocytes 09/13/2018 10.5  % Final     % Eosinophils 09/13/2018 9.8  % Final     % Basophils 09/13/2018 2.1  % Final     % Immature Granulocytes 09/13/2018 0.0  % Final     Nucleated RBCs 09/13/2018 0  0 /100 Final     Absolute Neutrophil 09/13/2018 2.2  1.6 - 8.3 10e9/L Final     Absolute Lymphocytes 09/13/2018 2.4  0.8 - 5.3 10e9/L Final     Absolute Monocytes 09/13/2018 0.6  0.0 - 1.3 10e9/L Final     Absolute Eosinophils 09/13/2018 0.6  0.0 - 0.7 10e9/L Final     Absolute Basophils 09/13/2018 0.1  0.0 - 0.2 10e9/L Final     Abs Immature Granulocytes 09/13/2018 0.0  0 - 0.4 10e9/L Final     Absolute Nucleated RBC 09/13/2018 0.0   Final     Sodium 09/13/2018 138  133 - 144 mmol/L Final     Potassium 09/13/2018 4.3  3.4 - 5.3 mmol/L Final     Chloride 09/13/2018 102  94 - 109 mmol/L Final     Carbon Dioxide 09/13/2018 30  20 - 32 mmol/L Final     Anion Gap 09/13/2018 5  3 - 14 mmol/L Final     Glucose 09/13/2018 119* 70 - 99 mg/dL Final     Urea Nitrogen 09/13/2018 12  7 - 30 mg/dL Final     Creatinine 09/13/2018 0.83  0.52 - 1.04 mg/dL Final     GFR Estimate 09/13/2018 72  >60 mL/min/1.7m2 Final     GFR Estimate If Black 09/13/2018 87  >60 mL/min/1.7m2 Final     Calcium 09/13/2018 9.2  8.5 - 10.1 mg/dL Final     Bilirubin Direct 09/13/2018 0.2  0.0 - 0.2 mg/dL Final     Bilirubin Total 09/13/2018 0.8  0.2 - 1.3 mg/dL Final     Albumin 09/13/2018 3.9  3.4 - 5.0 g/dL Final     Protein Total 09/13/2018 7.8  6.8 - 8.8 g/dL Final     Alkaline Phosphatase 09/13/2018 145  40 - 150 U/L Final     ALT 09/13/2018 59* 0 - 50 U/L Final     AST 09/13/2018 39  0 - 45 U/L Final     IGG 09/13/2018 1160  695 - 1620 mg/dL Final     IgG1 09/13/2018 416  300 - 856 mg/dL Final     IgG2 09/13/2018 616 707 - 761  mg/dL Final     IgG3 09/13/2018 83  24 - 192 mg/dL Final     IgG4 09/13/2018 95* 11 - 86 mg/dL Final       Again, thank you for allowing me to participate in the care of your patient.      Sincerely,    Rosanne Marti PA-C

## 2020-12-18 NOTE — LETTER
December 29, 2020      Dinora Mcghee  [address] [facility] [address]        Dear ,    We are writing to inform you of your test results.    Normal IgG4. Please do follow up with your liver doctor regarding increased liver tests on 12/17.    Resulted Orders   Immunoglobulin G subclasses   Result Value Ref Range     610 - 1,616 mg/dL    IgG1 424 382 - 929 mg/dL    IgG2 331 242 - 700 mg/dL    IgG3 64 22 - 176 mg/dL    IgG4 84 4 - 86 mg/dL       If you have any questions or concerns, please call the clinic at the number listed above.       Sincerely,      Anita Becerra MD

## 2020-12-18 NOTE — PATIENT INSTRUCTIONS
It was a pleasure taking care of you today.  I've included a brief summary of our discussion and care plan from today's visit below.  Please review this information with your primary care provider.  ______________________________________________________________________    My recommendations are summarized as follows:    --Continue Amitiza and max dose magnesium oxide  --Continue good water intake, exercise  --You can read about [employer], this might be a future option if your symptoms persist  --Meet with our GI dietitian.  I will have our schedulers call you.  Please let us know if you do not hear from them soon.  Our GI dietitians names are Graciela  --Let us know if symptoms change or worsen    Return to GI Clinic in 6 weeks to review your progress.    ______________________________________________________________________    How do I schedule labs, imaging studies, or procedures that were ordered in clinic today?   Labs: To schedule lab appointment at the [facility], use my chart or call [phone]. If you have a [facility] lab closer to home where you are regularly seen you can give them a call.     Procedures: If a colonoscopy, upper endoscopy, breath test, esophageal manometry, or pH impedence was ordered today, our endoscopy team will call you to schedule this. If you have not heard from our endoscopy team within a week, please call [phone] to schedule.     Imaging Studies: If you were scheduled for a CT scan, X-ray, MRI, ultrasound, HIDA scan or other imaging study, please call [phone] to have this scheduled.     Referral: If a referral to another specialty was ordered, expect a phone call or follow instructions above. If you have not heard from anyone regarding your referral in a week, please call our clinic to check the status.     Who do I call with any questions after my visit?  Please be in touch if there are any further questions that arise following today's visit.   There are multiple ways to contact your gastroenterology care team.        During business hours, you may reach a Gastroenterology nurse at [phone]      To schedule or reschedule an appointment, please call [phone].       You can always send a secure message through [employer].  [employer] messages are answered by your nurse or doctor typically within 24 hours.  Please allow extra time on weekends and holidays.        For urgent/emergent questions after business hours, you may reach the on-call GI Fellow by contacting the [facility]  at [phone].     How will I get the results of any tests ordered?    You will receive all of your results.  If you have signed up for [employer], any tests ordered at your visit will be available to you after your physician reviews them.  Typically this takes 1-2 weeks.  If there are urgent results that require a change in your care plan, your physician or nurse will call you to discuss the next steps.      What is [employer]?  [employer] is a secure way for you to access all of your healthcare records from the [facility].  It is a web based computer program, so you can sign on to it from any location.  It also allows you to send secure messages to your care team.  I recommend signing up for [employer] access if you have not already done so and are comfortable with using a computer.      How to I schedule a follow-up visit?  If you did not schedule a follow-up visit today, please call [phone] to schedule a follow-up office visit.      Sincerely,    Rosanne Marti PA-C  Division of Gastroenterology, Hepatology & Nutrition  [facility]

## 2020-12-19 LAB — ZINC SERPL-MCNC: 75.9 UG/DL (ref 60–120)

## 2020-12-20 LAB
A-TOCOPHEROL VIT E SERPL-MCNC: 11.2 MG/L (ref 5.5–18)
ANNOTATION COMMENT IMP: NORMAL
BETA+GAMMA TOCOPHEROL SERPL-MCNC: 0.4 MG/L (ref 0–6)
RETINYL PALMITATE SERPL-MCNC: <0.02 MG/L (ref 0–0.1)
VIT A SERPL-MCNC: 0.35 MG/L (ref 0.3–1.2)

## 2020-12-22 LAB
A-LINOLENATE SERPL-SCNC: 56 NMOL/ML (ref 50–130)
AA SERPL-SCNC: 1164 NMOL/ML (ref 520–1490)
ARACHIDATE SERPL-SCNC: 40 NMOL/ML (ref 50–90)
CLINICAL BIOCHEMIST REVIEW: ABNORMAL
DHA SERPL-SCNC: 390 NMOL/ML (ref 30–250)
DOCOSAPENTAENATE W6 SERPL-SCNC: 26 NMOL/ML (ref 10–70)
DOCOSATETRAENOATE SERPL-SCNC: 29 NMOL/ML (ref 10–80)
DOCOSENOATE SERPL-SCNC: 7 NMOL/ML (ref 4–13)
DPA SERPL-SCNC: 102 NMOL/ML (ref 20–210)
EPA SERPL-SCNC: 118 NMOL/ML (ref 14–100)
FA SERPL-SCNC: 11.3 MMOL/L (ref 7.3–16.8)
G-LINOLENATE SERPL-SCNC: 25 NMOL/ML (ref 16–150)
HEXADECENOATE SERPL-SCNC: 27 NMOL/ML (ref 25–105)
HOMO-G LINOLENATE SERPL-SCNC: 135 NMOL/ML (ref 50–250)
IGG SERPL-MCNC: 924 MG/DL (ref 610–1616)
IGG1 SER-MCNC: 424 MG/DL (ref 382–929)
IGG2 SER-MCNC: 331 MG/DL (ref 242–700)
IGG3 SER-MCNC: 64 MG/DL (ref 22–176)
IGG4 SER-MCNC: 84 MG/DL (ref 4–86)
LAURATE SERPL-SCNC: 18 NMOL/ML (ref 6–90)
LINOLEATE SERPL-SCNC: 3515 NMOL/ML (ref 2270–3850)
MEAD ACID SERPL-SCNC: 25 NMOL/ML (ref 7–30)
MONOUNSAT FA SERPL-SCNC: 2.2 MMOL/L (ref 1.3–5.8)
MYRISTATE SERPL-SCNC: 126 NMOL/ML (ref 30–450)
NERVONATE SERPL-SCNC: 96 NMOL/ML (ref 60–100)
OCTADECANOATE SERPL-SCNC: 960 NMOL/ML (ref 590–1170)
OLEATE SERPL-SCNC: 1679 NMOL/ML (ref 650–3500)
PALMITATE SERPL-SCNC: 2156 NMOL/ML (ref 1480–3730)
PALMITOLEATE SERPL-SCNC: 236 NMOL/ML (ref 110–1130)
POLYUNSAT FA SERPL-SCNC: 5.6 MMOL/L (ref 3.2–5.8)
SAT FA SERPL-SCNC: 3.4 MMOL/L (ref 2.5–5.5)
TRIENOATE/AA SERPL-SRTO: 0.02 {RATIO} (ref 0.01–0.04)
VACCENATE SERPL-SCNC: 179 NMOL/ML (ref 280–740)
W3 FA SERPL-SCNC: 0.7 MMOL/L (ref 0.2–0.5)
W6 FA SERPL-SCNC: 4.9 MMOL/L (ref 3–5.4)

## 2020-12-23 ENCOUNTER — VIRTUAL VISIT (OUTPATIENT)
Dept: GASTROENTEROLOGY | Facility: CLINIC | Age: 55
End: 2020-12-23
Payer: COMMERCIAL

## 2020-12-23 DIAGNOSIS — K31.84 GASTROPARESIS: Primary | ICD-10-CM

## 2020-12-23 DIAGNOSIS — Z90.410 POST-PANCREATECTOMY DIABETES (H): ICD-10-CM

## 2020-12-23 DIAGNOSIS — K59.00 CONSTIPATION, UNSPECIFIED CONSTIPATION TYPE: ICD-10-CM

## 2020-12-23 DIAGNOSIS — E89.1 POST-PANCREATECTOMY DIABETES (H): ICD-10-CM

## 2020-12-23 DIAGNOSIS — K86.89 PANCREATIC INSUFFICIENCY: ICD-10-CM

## 2020-12-23 DIAGNOSIS — K21.9 GASTROESOPHAGEAL REFLUX DISEASE WITHOUT ESOPHAGITIS: ICD-10-CM

## 2020-12-23 DIAGNOSIS — R10.13 ABDOMINAL PAIN, EPIGASTRIC: ICD-10-CM

## 2020-12-23 DIAGNOSIS — R11.2 NON-INTRACTABLE VOMITING WITH NAUSEA, UNSPECIFIED VOMITING TYPE: ICD-10-CM

## 2020-12-23 DIAGNOSIS — E13.9 POST-PANCREATECTOMY DIABETES (H): ICD-10-CM

## 2020-12-23 DIAGNOSIS — Z90.410 HISTORY OF PANCREATECTOMY: ICD-10-CM

## 2020-12-23 PROCEDURE — 97802 MEDICAL NUTRITION INDIV IN: CPT | Mod: 95 | Performed by: DIETITIAN, REGISTERED

## 2020-12-23 NOTE — LETTER
"  12/23/2020       RE: Dinora Mcghee  [address] [facility] [address]      Dear Colleague,    Thank you for referring your patient, Dinora Mcghee, to the [facility] [place]. Please see a copy of my visit note below.    Dinora Mcghee is a 55 year old female who is being evaluated via a billable video visit.      The patient has been notified of following:     \"This video visit will be conducted via a call between you and your physician/provider. We have found that certain health care needs can be provided without the need for an in-person physical exam.  This service lets us provide the care you need with a video conversation.  If a prescription is necessary we can send it directly to your pharmacy.  If lab work is needed we can place an order for that and you can then stop by our lab to have the test done at a later time.    Video visits are billed at different rates depending on your insurance coverage.  Please reach out to your insurance provider with any questions.    If during the course of the call the physician/provider feels a video visit is not appropriate, you will not be charged for this service.\"    Patient has given verbal consent for Video visit? Yes  During this virtual visit the patient is located in [place], patient verifies this as the location during the entirety of this visit.     How would you like to obtain your AVS? MyChart  If you are dropped from the video visit, the video invite should be resent to: Other e-mail: my chart  Will anyone else be joining your video visit? No         Video-Visit Details    Type of service:  Video Visit    Video Start Time: 2:31 pm  Video End Time: 3:25 + additional ~5 minutes via phone (as video froze 5 minutes before end of appt and writer and patient had some final items to discuss.    Originating Location (pt. Location): Home    Distant Location (provider location):  [facility] " [place]     Platform used for Video Visit: [facility] Outpatient Medical Nutrition Therapy      Time Spent:  60 minutes  Session Type:  Initial  Referring Physician:  Rosanne Marti PA-C  Reason for RD Visit:   gastroparesis     Nutrition Assessment:  Patient is a 55 year old female with history that includes chronic pancreatitis s/p TPAIT, post-pancreatectomy diabetes, pancreatic insufficiency, gastroparesis, constipation who has had nausea, vomiting and abdominal pain recently.      Patient stated that overall she had been doing really well up until about mid November she had immediate bloating and nausea and vomiting and pain.  About 2 weeks ago she had another gastroparesis flare with similar symptoms.  She had been following more liquid diet over the past few days and then reintroduce solids over the past week.  She has been trying to follow gastroparesis diet guidelines including eating 6 smaller meals of about 1 to 2 cups of food at a meal and lower fiber and lower fat.  Last night she had about a cup combined of pasta with cooked vegetables and tomato sauce but had vomiting overnight.  She ate dinner about 8 PM and went to bed about 10 PM.  She also takes medication at bedtime so has been eating a couple of crackers with medication.  Overall she feels full after eating only a few bites of food she got a juicer and has been making homemade vegetable juice.  Her juice or does separate the pulp from a juice of the vegetables.  She stated that she cannot tolerate protein drinks and even the smell makes her nauseous as she tried to have these in the past after surgery and transplant.  She does have a soy protein powder that she will sometimes add to fluids.  She loves to cook and also loves to eat food in general.  She tries to eat a mostly vegetarian diet.  With following the gastroparesis diet she eats cooked vegetables.  Also she is a health and . Additionally she  "is on PERT and consistently takes enzymes with meals and snacks. She typically spreads her dose out throughout the meal to take some at beginning, some mid- and some end of meal. She also stated she has a long list of foods that she is allergic to such as apples, strawberries, peaches, pitted fruits.  She tolerates cantaloupe, pears, bananas, mandarin oranges and grapefruit as well as blueberries, raspberries pomegranate and sometimes blackberries.  She stated prior to having issues with gastroparesis flare, she had been eating pomegranate seeds, so suspects this may have contributed to her symptoms. She has been trying to follow a gastroparesis diet on her own, but since her symptoms seem ongoing she is interested in diet education with tips and suggestions for gastroparesis.    Height:   Ht Readings from Last 1 Encounters:   12/18/20 1.702 m (5' 7\")     Weight: she reported losing 6 lbs over the past two months with most recent weight of 153 lbs.   Wt Readings from Last 10 Encounters:   12/18/20 69.9 kg (154 lb)   02/26/20 70.9 kg (156 lb 3.2 oz)   01/31/20 71.6 kg (157 lb 13.6 oz)   09/17/19 70.9 kg (156 lb 3.2 oz)   09/13/19 71.8 kg (158 lb 6.4 oz)   07/02/19 71.2 kg (156 lb 14.4 oz)   04/05/19 70.8 kg (156 lb)   02/19/19 69.9 kg (154 lb)   02/01/19 69.8 kg (153 lb 14.4 oz)   02/01/19 69.8 kg (153 lb 14.4 oz)        BMI: 23.95    Diet Recall:  (some recent meals)  Meal Food    Breakfast Toast with about 1-2 T peanut butter. Prior to GP flare: scrambled eggs whites with cooked veg or oatmeal or smoothie    Lunch Cooked veg and scrambled egg/egg white or PB toast   Dinner Pasta or rice with cooked veg (mushrooms, carrots, squash)   Snacks Has a cup of homemade veg juice in between B&L and L&D and crackers, meka cookie or toast   Beverages 72-96 oz water/day, cup of herbal tea (dilshad, tumeric or sleepytime) with lemon and 2 cups black coffee in am      Labs:    Last Comprehensive Metabolic Panel:  Sodium "   Date Value Ref Range Status   12/17/2020 142 133 - 144 mmol/L Final     Potassium   Date Value Ref Range Status   12/17/2020 3.5 3.4 - 5.3 mmol/L Final     Chloride   Date Value Ref Range Status   12/17/2020 106 94 - 109 mmol/L Final     Carbon Dioxide   Date Value Ref Range Status   12/17/2020 32 20 - 32 mmol/L Final     Anion Gap   Date Value Ref Range Status   12/17/2020 4 3 - 14 mmol/L Final     Glucose   Date Value Ref Range Status   12/17/2020 120 (H) 70 - 99 mg/dL Final     Urea Nitrogen   Date Value Ref Range Status   12/17/2020 9 7 - 30 mg/dL Final     Creatinine   Date Value Ref Range Status   12/17/2020 0.78 0.52 - 1.04 mg/dL Final     GFR Estimate   Date Value Ref Range Status   12/17/2020 85 >60 mL/min/[1.73_m2] Final     Comment:     Non  GFR Calc  Starting 12/18/2018, serum creatinine based estimated GFR (eGFR) will be   calculated using the Chronic Kidney Disease Epidemiology Collaboration   (CKD-EPI) equation.       Calcium   Date Value Ref Range Status   12/17/2020 9.1 8.5 - 10.1 mg/dL Final     CBC RESULTS:   Recent Labs   Lab Test 12/17/20  0739   WBC 5.0   RBC 4.29   HGB 13.5   HCT 41.4   MCV 97   MCH 31.5   MCHC 32.6   RDW 13.2        Pertinent Medications/vitamin and mineral supplements:     Current Outpatient Medications   Medication     acetaminophen 500 MG CAPS     amitriptyline (ELAVIL) 10 MG tablet     amylase-lipase-protease (CREON 24) 78935-73168 units CPEP per EC capsule     aspirin 81 MG EC tablet     azelastine (ASTELIN) 0.1 % nasal spray     blood glucose (PAT CONTOUR NEXT) test strip     blood glucose monitoring (PAT MICROLET) lancets     calcium carbonate-vitamin D (CALCIUM 500/D) 500-200 MG-UNIT tablet     cetirizine (ZYRTEC) 10 MG tablet     diphenhydrAMINE (BENADRYL ALLERGY) 25 MG capsule     docusate sodium (COLACE) 100 MG capsule     estradiol (VAGIFEM) 10 MCG TABS vaginal tablet     famotidine (PEPCID) 20 MG tablet     fish oil-omega-3 fatty  acids 1000 MG capsule     FOLIC ACID PO     glucose 40 % GEL gel     ibuprofen (ADVIL/MOTRIN) 400 MG tablet     levothyroxine (SYNTHROID) 137 MCG tablet     lubiprostone (AMITIZA) 8 MCG capsule     magnesium oxide 200 MG TABS     montelukast (SINGULAIR) 10 MG tablet     multivitamin CF formula (CHOICEFUL) capsule     Nutritional Supplements (BOOST HIGH PROTEIN) LIQD     omeprazole (PRILOSEC) 40 MG DR capsule     order for DME     prochlorperazine (COMPAZINE) 5 MG tablet     promethazine (PHENERGAN) 25 MG tablet     senna-docusate (SENOKOT-S;PERICOLACE) 8.6-50 MG per tablet     Sharps Container (BD SHARPS ) MISC     ZOLMitriptan (ZOMIG) 5 MG tablet     No current facility-administered medications for this visit.         Food Allergies:  She reported allergy to apples, strawberries, peaches, pitted fruits.    MALNUTRITION:  % Weight Loss:  Weight loss does not meet criteria for malnutrition   % Intake:  Decreased intake does not meet criteria for malnutrition   Subcutaneous Fat Loss:  None observed  Muscle Loss:  None observed  Fluid Retention:  None noted    Malnutrition Diagnosis: Patient does not meet two of the above criteria necessary for diagnosing malnutrition  In Context of:  Chronic illness or disease    Nutrition Diagnosis:    Food and nutrition related knowledge deficit related to lack of previous diet education/lack of complete recall of previous diet education as evidenced by pt report and interest in diet education/review.    Nutrition Prescription: low fat, low fiber diet/gastroparesis diet    Nutrition Intervention:    Nutrition Education/Counseling:  Provided diet education for gastroparesis. Discussed recommendations for eating at 4-6 smaller meals/snacks per day if better tolerated and recommended not skipping meals. Can have oral nutrition supplement/protein drink for meal(s) or snack(s) instead of skipping. Discussed some example drinks that may be better tolerated. Also reviewed some  clear liquid protein drinks pt can try especially if unable to tolerate full liquid drinks. Recommended eating a lower fat diet and lower fiber diet as tolerated. Provided diet education on lowfat diet. Dscussed foods high in fat and substitutions for those foods. Discussed food sources of fiber and discussed some tips for following a low fiber diet. Discussed ways in which to include some fruits and vegetables that may be better tolerated such as removing skins, peels, seeds from fruits and vegetables and cooking well before eating and having canned non-stringy fruits.     Encouraged patient to chew food well before swallowing. Discussed adequate hydration with recommendation to drink at least 64 oz or more (8 cups) fluids per day. Encouraged patient to limit fluids with meals except sips as needed and drink the majority of fluids in between meals which may help so patient does not get too full at meals and to allow patient to eat solid foods at meals. Discussed tips and various stages of gastroparesis diet depending on how patient is feeling and able to tolerate. Gave patient gastroparesis diet education handout.    Patient verbalized understanding of education provided. See Goals below.     Goals:    1. Eat 4-6 smaller meals per day that are lower in fat and lower in fiber.    For low fiber diet:  -Avoid raw vegetables. Remove skins and peels from vegetables and cook vegetables very well/until more mushy before eating. (may be better tolerated mashed well or blended to pureed consistency).   -Avoid any vegetables that do not cook down well to a mushier/softer consistency.  -OK to eat canned peaches or canned pear, as well as applesauce. Avoid raw fruit with the exception of ripe bananas, ripe cantaloupe or ripe honeydew melon only.  -Can eat cooked potatoes (no skin), cooked sweet potatoes (no skin), and cooked winter squash (such as butternut squash or acorn squash), no skins.  -Avoid whole grain  "breads/crackers. Ok to Choose white bread, low fiber crackers such as saltines, white rice, regular pasta not wheat).   -Avoid dry grissly meats. Tender/moist meat, fish, tofu, ground meats (beef, turkey, chicken) tend to be better tolerated. Can add these to soups, eat with sauces so they are softer and more moist cooked.   -Do not eat nuts or seeds.   -Can have smooth peanut butter. (not chunky).    For low fat diet:  -Choose lean proteins/lean cuts of meats. Remove skins from chicken and turkey breast, fish (not breaded and fried), lean cuts of pork, limit red meat and if having choose lean cut or 93% lean ground beef. Can substitute with ground turkey or ground chicken for beef.  -Use lower fat cooking methods such as baking, broiling, grilling.  -Limit using a lot of fats/oil/high fat sauces/dressing/butter when preparing foods and limit the amount used on foods.  -Choose skim or 1% based dairy products (such as lowfat yogurts, skim/1% milk, non fat/1% cottage cheese, lowfat pudding).  -Do not eat large amounts of nuts, seeds, nut butters, avocado.    2. Can have protein drink as a small meal/snack if better tolerated and/or instead of skipping a meal. These drinks count towards those 4-6 smaller meals.  --Can make your own drink using your soy protein powder mixed with milk substitute such as almond milk or oat milk or yogurt.    3. Chew food very well before swallowing.  4. Limit fluids with meals (only take small sips as needed) and drink the rest of your fluids in between meals.  5. Drink at least 6-8 cups (48 oz-64 oz) per day (in between meals).  6. Can keep daily food and beverage symptom journals to see if you can find any specific food/beverages or patterns of eating causing an increase in symptoms. Some apps for symptom journaling include \"My Symptoms Tracker\" or \"Orin\" beatris.  7. Could wait at least 3 hours after eating your dinner before lying down and going to sleep. (okay to have a small " snack/crackers with your medications just try not to eat the larger meal within 3 hours of bedtime to see if this is better tolerated).    Nutrition Monitoring and Evaluation: Will monitor adherence to nutrition recommendations at future RD visits.     Further Medical Nutrition Therapy:  Can follow up in 1 month or as needed.    Patient was encouraged to call/contact RD with any further questions.    Quyen Sanz, MS, RD, LD

## 2020-12-24 LAB
DEPRECATED CALCIDIOL+CALCIFEROL SERPL-MC: <53 UG/L (ref 20–75)
VITAMIN D2 SERPL-MCNC: <5 UG/L
VITAMIN D3 SERPL-MCNC: 48 UG/L

## 2020-12-24 NOTE — PATIENT INSTRUCTIONS
It was nice meeting you today. Below are the nutrition recommendations we discussed at your visit.    Please let me know if you have any additional questions.    Nutrition recommendations    1. Eat 4-6 smaller meals per day that are lower in fat and lower in fiber.    For low fiber diet:  -Avoid raw vegetables. Remove skins and peels from vegetables and cook vegetables very well/until more mushy before eating. (may be better tolerated mashed well or blended to pureed consistency).   -Avoid any vegetables that do not cook down well to a mushier/softer consistency.  -OK to eat canned peaches or canned pear, as well as applesauce. Avoid raw fruit with the exception of ripe bananas, ripe cantaloupe or ripe honeydew melon only.  -Can eat cooked potatoes (no skin), cooked sweet potatoes (no skin), and cooked winter squash (such as butternut squash or acorn squash), no skins.  -Avoid whole grain breads/crackers. Ok to Choose white bread, low fiber crackers such as saltines, white rice, regular pasta not wheat).   -Avoid dry grissly meats. Tender/moist meat, fish, tofu, ground meats (beef, turkey, chicken) tend to be better tolerated. Can add these to soups, eat with sauces so they are softer and more moist cooked.   -Do not eat nuts or seeds.   -Can have smooth peanut butter. (not chunky).    For low fat diet:  -Choose lean proteins/lean cuts of meats. Remove skins from chicken and turkey breast, fish (not breaded and fried), lean cuts of pork, limit red meat and if having choose lean cut or 93% lean ground beef. Can substitute with ground turkey or ground chicken for beef.  -Use lower fat cooking methods such as baking, broiling, grilling.  -Limit using a lot of fats/oil/high fat sauces/dressing/butter when preparing foods and limit the amount used on foods.  -Choose skim or 1% based dairy products (such as lowfat yogurts, skim/1% milk, non fat/1% cottage cheese, lowfat pudding).  -Do not eat large amounts of nuts, seeds,  "nut butters, avocado.    2. Can have protein drink as a small meal/snack if better tolerated and/or instead of skipping a meal. These drinks count towards those 4-6 smaller meals.    --Can make your own drink using your soy protein powder mixed with milk substitute such as almond milk or oat milk or yogurt.    3. Chew food very well before swallowing.    4. Limit fluids with meals (only take small sips as needed) and drink the rest of your fluids in between meals.    5. Drink at least 6-8 cups (48 oz-64 oz) per day (in between meals).    6. Can keep daily food and beverage symptom journals to see if you can find any specific food/beverages or patterns of eating causing an increase in symptoms. Some apps for symptom journaling include \"My Symptoms Tracker\" or \"Orin\" beatris.    7. Could wait at least 3 hours after eating your dinner before lying down and going to sleep. (okay to have a small snack/crackers with your medications just try not to eat the larger meal within 3 hours of bedtime to see if this is better tolerated).    Can follow up in 1 month or as needed.    If you would like to schedule a follow up appointment with Quyen Sanz, Registered Dietitian, please call [phone].    Quyen Sanz, MS, RD, LD      "

## 2020-12-24 NOTE — PROGRESS NOTES
"Dinora Mcghee is a 55 year old female who is being evaluated via a billable video visit.      The patient has been notified of following:     \"This video visit will be conducted via a call between you and your physician/provider. We have found that certain health care needs can be provided without the need for an in-person physical exam.  This service lets us provide the care you need with a video conversation.  If a prescription is necessary we can send it directly to your pharmacy.  If lab work is needed we can place an order for that and you can then stop by our lab to have the test done at a later time.    Video visits are billed at different rates depending on your insurance coverage.  Please reach out to your insurance provider with any questions.    If during the course of the call the physician/provider feels a video visit is not appropriate, you will not be charged for this service.\"    Patient has given verbal consent for Video visit? Yes  During this virtual visit the patient is located in [place], patient verifies this as the location during the entirety of this visit.     How would you like to obtain your AVS? MyChart  If you are dropped from the video visit, the video invite should be resent to: Other e-mail: my chart  Will anyone else be joining your video visit? No     Video-Visit Details    Type of service:  Video Visit    Video Start Time: 2:31 pm  Video End Time: 3:25 + additional ~5 minutes via phone (as video froze 5 minutes before end of appt and writer and patient had some final items to discuss.    Originating Location (pt. Location): Home    Distant Location (provider location):  [facility] [place]     Platform used for Video Visit: Emeli Sanz, MS, RD, LD    [facility] Outpatient Medical Nutrition Therapy      Time Spent:  60 minutes  Session Type:  Initial  Referring Physician:  Rosanne Marti PA-C  Reason for RD Visit:   " gastroparesis     Nutrition Assessment:  Patient is a 55 year old female with history that includes chronic pancreatitis s/p TPAIT, post-pancreatectomy diabetes, pancreatic insufficiency, gastroparesis, constipation who has had nausea, vomiting and abdominal pain recently.      Patient stated that overall she had been doing really well up until about mid November she had immediate bloating and nausea and vomiting and pain.  About 2 weeks ago she had another gastroparesis flare with similar symptoms.  She had been following more liquid diet over the past few days and then reintroduce solids over the past week.  She has been trying to follow gastroparesis diet guidelines including eating 6 smaller meals of about 1 to 2 cups of food at a meal and lower fiber and lower fat.  Last night she had about a cup combined of pasta with cooked vegetables and tomato sauce but had vomiting overnight.  She ate dinner about 8 PM and went to bed about 10 PM.  She also takes medication at bedtime so has been eating a couple of crackers with medication.  Overall she feels full after eating only a few bites of food she got a juicer and has been making homemade vegetable juice.  Her juice or does separate the pulp from a juice of the vegetables.  She stated that she cannot tolerate protein drinks and even the smell makes her nauseous as she tried to have these in the past after surgery and transplant.  She does have a soy protein powder that she will sometimes add to fluids.  She loves to cook and also loves to eat food in general.  She tries to eat a mostly vegetarian diet.  With following the gastroparesis diet she eats cooked vegetables.  Also she is a health and . Additionally she is on PERT and consistently takes enzymes with meals and snacks. She typically spreads her dose out throughout the meal to take some at beginning, some mid- and some end of meal. She also stated she has a long list of foods that she is  "allergic to such as apples, strawberries, peaches, pitted fruits.  She tolerates cantaloupe, pears, bananas, mandarin oranges and grapefruit as well as blueberries, raspberries pomegranate and sometimes blackberries.  She stated prior to having issues with gastroparesis flare, she had been eating pomegranate seeds, so suspects this may have contributed to her symptoms. She has been trying to follow a gastroparesis diet on her own, but since her symptoms seem ongoing she is interested in diet education with tips and suggestions for gastroparesis.    Height:   Ht Readings from Last 1 Encounters:   12/18/20 1.702 m (5' 7\")     Weight: she reported losing 6 lbs over the past two months with most recent weight of 153 lbs.   Wt Readings from Last 10 Encounters:   12/18/20 69.9 kg (154 lb)   02/26/20 70.9 kg (156 lb 3.2 oz)   01/31/20 71.6 kg (157 lb 13.6 oz)   09/17/19 70.9 kg (156 lb 3.2 oz)   09/13/19 71.8 kg (158 lb 6.4 oz)   07/02/19 71.2 kg (156 lb 14.4 oz)   04/05/19 70.8 kg (156 lb)   02/19/19 69.9 kg (154 lb)   02/01/19 69.8 kg (153 lb 14.4 oz)   02/01/19 69.8 kg (153 lb 14.4 oz)        BMI: 23.95    Diet Recall:  (some recent meals)  Meal Food    Breakfast Toast with about 1-2 T peanut butter. Prior to GP flare: scrambled eggs whites with cooked veg or oatmeal or smoothie    Lunch Cooked veg and scrambled egg/egg white or PB toast   Dinner Pasta or rice with cooked veg (mushrooms, carrots, squash)   Snacks Has a cup of homemade veg juice in between B&L and L&D and crackers, meka cookie or toast   Beverages 72-96 oz water/day, cup of herbal tea (dilshad, tumeric or sleepytime) with lemon and 2 cups black coffee in am      Labs:    Last Comprehensive Metabolic Panel:  Sodium   Date Value Ref Range Status   12/17/2020 142 133 - 144 mmol/L Final     Potassium   Date Value Ref Range Status   12/17/2020 3.5 3.4 - 5.3 mmol/L Final     Chloride   Date Value Ref Range Status   12/17/2020 106 94 - 109 mmol/L Final "     Carbon Dioxide   Date Value Ref Range Status   12/17/2020 32 20 - 32 mmol/L Final     Anion Gap   Date Value Ref Range Status   12/17/2020 4 3 - 14 mmol/L Final     Glucose   Date Value Ref Range Status   12/17/2020 120 (H) 70 - 99 mg/dL Final     Urea Nitrogen   Date Value Ref Range Status   12/17/2020 9 7 - 30 mg/dL Final     Creatinine   Date Value Ref Range Status   12/17/2020 0.78 0.52 - 1.04 mg/dL Final     GFR Estimate   Date Value Ref Range Status   12/17/2020 85 >60 mL/min/[1.73_m2] Final     Comment:     Non  GFR Calc  Starting 12/18/2018, serum creatinine based estimated GFR (eGFR) will be   calculated using the Chronic Kidney Disease Epidemiology Collaboration   (CKD-EPI) equation.       Calcium   Date Value Ref Range Status   12/17/2020 9.1 8.5 - 10.1 mg/dL Final     CBC RESULTS:   Recent Labs   Lab Test 12/17/20  0739   WBC 5.0   RBC 4.29   HGB 13.5   HCT 41.4   MCV 97   MCH 31.5   MCHC 32.6   RDW 13.2        Pertinent Medications/vitamin and mineral supplements:     Current Outpatient Medications   Medication     acetaminophen 500 MG CAPS     amitriptyline (ELAVIL) 10 MG tablet     amylase-lipase-protease (CREON 24) 30325-80518 units CPEP per EC capsule     aspirin 81 MG EC tablet     azelastine (ASTELIN) 0.1 % nasal spray     blood glucose (PAT CONTOUR NEXT) test strip     blood glucose monitoring (PAT MICROLET) lancets     calcium carbonate-vitamin D (CALCIUM 500/D) 500-200 MG-UNIT tablet     cetirizine (ZYRTEC) 10 MG tablet     diphenhydrAMINE (BENADRYL ALLERGY) 25 MG capsule     docusate sodium (COLACE) 100 MG capsule     estradiol (VAGIFEM) 10 MCG TABS vaginal tablet     famotidine (PEPCID) 20 MG tablet     fish oil-omega-3 fatty acids 1000 MG capsule     FOLIC ACID PO     glucose 40 % GEL gel     ibuprofen (ADVIL/MOTRIN) 400 MG tablet     levothyroxine (SYNTHROID) 137 MCG tablet     lubiprostone (AMITIZA) 8 MCG capsule     magnesium oxide 200 MG TABS      montelukast (SINGULAIR) 10 MG tablet     multivitamin CF formula (CHOICEFUL) capsule     Nutritional Supplements (BOOST HIGH PROTEIN) LIQD     omeprazole (PRILOSEC) 40 MG DR capsule     order for DME     prochlorperazine (COMPAZINE) 5 MG tablet     promethazine (PHENERGAN) 25 MG tablet     senna-docusate (SENOKOT-S;PERICOLACE) 8.6-50 MG per tablet     Sharps Container (BD SHARPS ) MISC     ZOLMitriptan (ZOMIG) 5 MG tablet     No current facility-administered medications for this visit.         Food Allergies:  She reported allergy to apples, strawberries, peaches, pitted fruits.    MALNUTRITION:  % Weight Loss:  Weight loss does not meet criteria for malnutrition   % Intake:  Decreased intake does not meet criteria for malnutrition   Subcutaneous Fat Loss:  None observed  Muscle Loss:  None observed  Fluid Retention:  None noted    Malnutrition Diagnosis: Patient does not meet two of the above criteria necessary for diagnosing malnutrition  In Context of:  Chronic illness or disease    Nutrition Diagnosis:    Food and nutrition related knowledge deficit related to lack of previous diet education/lack of complete recall of previous diet education as evidenced by pt report and interest in diet education/review.    Nutrition Prescription: low fat, low fiber diet/gastroparesis diet    Nutrition Intervention:    Nutrition Education/Counseling:  Provided diet education for gastroparesis. Discussed recommendations for eating at 4-6 smaller meals/snacks per day if better tolerated and recommended not skipping meals. Can have oral nutrition supplement/protein drink for meal(s) or snack(s) instead of skipping. Discussed some example drinks that may be better tolerated. Also reviewed some clear liquid protein drinks pt can try especially if unable to tolerate full liquid drinks. Recommended eating a lower fat diet and lower fiber diet as tolerated. Provided diet education on lowfat diet. Dscussed foods high in fat and  substitutions for those foods. Discussed food sources of fiber and discussed some tips for following a low fiber diet. Discussed ways in which to include some fruits and vegetables that may be better tolerated such as removing skins, peels, seeds from fruits and vegetables and cooking well before eating and having canned non-stringy fruits.     Encouraged patient to chew food well before swallowing. Discussed adequate hydration with recommendation to drink at least 64 oz or more (8 cups) fluids per day. Encouraged patient to limit fluids with meals except sips as needed and drink the majority of fluids in between meals which may help so patient does not get too full at meals and to allow patient to eat solid foods at meals. Discussed tips and various stages of gastroparesis diet depending on how patient is feeling and able to tolerate. Gave patient gastroparesis diet education handout.    Patient verbalized understanding of education provided. See Goals below.     Goals:    1. Eat 4-6 smaller meals per day that are lower in fat and lower in fiber.    For low fiber diet:  -Avoid raw vegetables. Remove skins and peels from vegetables and cook vegetables very well/until more mushy before eating. (may be better tolerated mashed well or blended to pureed consistency).   -Avoid any vegetables that do not cook down well to a mushier/softer consistency.  -OK to eat canned peaches or canned pear, as well as applesauce. Avoid raw fruit with the exception of ripe bananas, ripe cantaloupe or ripe honeydew melon only.  -Can eat cooked potatoes (no skin), cooked sweet potatoes (no skin), and cooked winter squash (such as butternut squash or acorn squash), no skins.  -Avoid whole grain breads/crackers. Ok to Choose white bread, low fiber crackers such as saltines, white rice, regular pasta not wheat).   -Avoid dry grissly meats. Tender/moist meat, fish, tofu, ground meats (beef, turkey, chicken) tend to be better tolerated. Can  "add these to soups, eat with sauces so they are softer and more moist cooked.   -Do not eat nuts or seeds.   -Can have smooth peanut butter. (not chunky).    For low fat diet:  -Choose lean proteins/lean cuts of meats. Remove skins from chicken and turkey breast, fish (not breaded and fried), lean cuts of pork, limit red meat and if having choose lean cut or 93% lean ground beef. Can substitute with ground turkey or ground chicken for beef.  -Use lower fat cooking methods such as baking, broiling, grilling.  -Limit using a lot of fats/oil/high fat sauces/dressing/butter when preparing foods and limit the amount used on foods.  -Choose skim or 1% based dairy products (such as lowfat yogurts, skim/1% milk, non fat/1% cottage cheese, lowfat pudding).  -Do not eat large amounts of nuts, seeds, nut butters, avocado.    2. Can have protein drink as a small meal/snack if better tolerated and/or instead of skipping a meal. These drinks count towards those 4-6 smaller meals.  --Can make your own drink using your soy protein powder mixed with milk substitute such as almond milk or oat milk or yogurt.    3. Chew food very well before swallowing.  4. Limit fluids with meals (only take small sips as needed) and drink the rest of your fluids in between meals.  5. Drink at least 6-8 cups (48 oz-64 oz) per day (in between meals).  6. Can keep daily food and beverage symptom journals to see if you can find any specific food/beverages or patterns of eating causing an increase in symptoms. Some apps for symptom journaling include \"My Symptoms Tracker\" or \"Orin\" beatris.  7. Could wait at least 3 hours after eating your dinner before lying down and going to sleep. (okay to have a small snack/crackers with your medications just try not to eat the larger meal within 3 hours of bedtime to see if this is better tolerated).    Nutrition Monitoring and Evaluation: Will monitor adherence to nutrition recommendations at future RD visits. "     Further Medical Nutrition Therapy:  Can follow up in 1 month or as needed.    Patient was encouraged to call/contact RD with any further questions.    Quyen Sanz MS, RD, LD

## 2020-12-29 ENCOUNTER — MYC MEDICAL ADVICE (OUTPATIENT)
Dept: INTERNAL MEDICINE | Facility: CLINIC | Age: 55
End: 2020-12-29

## 2020-12-29 DIAGNOSIS — R79.89 ELEVATED LFTS: Primary | ICD-10-CM

## 2021-01-01 ENCOUNTER — MYC MEDICAL ADVICE (OUTPATIENT)
Dept: INTERNAL MEDICINE | Facility: CLINIC | Age: 56
End: 2021-01-01

## 2021-01-11 DIAGNOSIS — R79.89 ELEVATED LFTS: ICD-10-CM

## 2021-01-11 LAB
ALT SERPL W P-5'-P-CCNC: 52 U/L (ref 0–50)
AST SERPL W P-5'-P-CCNC: 24 U/L (ref 0–45)

## 2021-01-11 PROCEDURE — 84460 ALANINE AMINO (ALT) (SGPT): CPT | Performed by: PATHOLOGY

## 2021-01-11 PROCEDURE — 36415 COLL VENOUS BLD VENIPUNCTURE: CPT | Performed by: PATHOLOGY

## 2021-01-11 PROCEDURE — 84450 TRANSFERASE (AST) (SGOT): CPT | Performed by: PATHOLOGY

## 2021-01-14 NOTE — PROGRESS NOTES
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     1.  Abdominal pain  Patient reports worsening abdominal pain, nausea and vomiting starting a little over a month ago.  Of note, had been working on a more vegan diet around the time.  Was evaluated in the emergency department with unremarkable CT scan and lab work-up.  Recently had small bowel follow-through without evidence of stricture or obstruction.  Patient reports symptoms improved with dietary changes as has been discussed with our GI dietitian.  Would recommend that she continues to track symptoms, can follow-up with general if she has further questions.  If her symptoms worsen significantly, would recommend going to a primarily liquid diet.     Stool pattern continues to be variable with occasional days of emptying.  Would recommend continuing magnesium and Amitiza at this time.  If concern for increased constipation, could evaluate with abdominal x-ray for stool burden.  Suspect that symptoms are due to exacerbation of underlying gastroparesis.    If gastroparesis symptoms worsen significantly, future considerations include 2 weeks of erythromycin or Motegrity.    --Continue Amitiza and max dose magnesium oxide  --Continue good water intake, exercise  --Follow dietary recommendations as you have discussed with Quyen.  You are doing a great job with this!  It is okay to go to a more liquid diet if symptoms worsen.  Continue to monitor your weight  --Let us know if symptoms change  or worsen    RTC 4-6 months     33 Minutes was spent on the date of the encounter during chart review, history and exam, documentation, and further activities as noted       Note completed using voice recognition software. Some word and grammatical errors may occur.    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     Summarized from previous documentation with Dr. Genao and I, please see previous notes for further details     Gastroparesis  Gastric emptying study with 2-hour study at [facility] have been consistent with gastroparesis, patient had follow-up study here 6/27/2016 with 49% remaining at 4 hours however the patient was not aware she was on narcotics at the time.  Patient had previous GJ tubes in fall 2016, NG tube used for venting with tube feeds and was able to wean off of tube feeds after TPA T.  At initial visit she reported severe migraine headaches associated with vomiting and is on amitriptyline for migraine prophylaxis.  Patient has used several medication for the symptoms including scopolamine patch, pro-chloro para Rufino, promethazine which helped symptoms.  She has also been seen by neurology and psychology due to concern that she has a difficult time sleeping and with migraines.  At her last visit, patient had been working with dietition to increase caloric intake and was maintaining weight around 150 lbs. She reported occasional nausea without vomiting, with 1 episode in the last month. Symptoms may have been worsened  "due to anxiety regarding surgery and ativan had helped.  At the patient's last visit:  the patient reports that since taking the Phenergan at night, her nausea and vomiting has improved significantly and she is not waking up as often.  She does report an episode around the holidays in which he vomited 5-6 times however thinks this may be due to a GI bug.  Now she reports that she has nausea about 2 times a week depending on the week.  Is also taking Compazine as needed and no longer taking Zofran.  She also understands that blood sugar may play a role in her nausea therefore she will check her blood sugar when nauseous.    Last year: The patient notes improvement in her symptoms if she waits to eat breakfast until 10-11 in the morning. She continues to take compazine in the morning, and phenergan at night. Still will experience vomiting, especially if she does not take phenergan at night. Nausea/vomiting worsens with migraines which have improved while avoiding gluten and increasing fruits and vegetables (2 days a month instead of 4 times a week). Also has cut back on meat and aims to eat more chicken and fish.     Constipation/irregular bowel movements/bloating  Patient reports history of ongoing constipation for over 20 years after having her first child.  She has tried multiple interventions including a bowel cleanout, MiraLAX, senna, milk of magnesium, Linzess, and Amitiza for which she was on when she first came to [facility] and  GI clinic.  Patient also takes creon.  Patient has done a course of rifaximin in the past with with improvement in bloating and stool consistency.  Patient has also been on specific SIBO/FODMAP diets to the [facility] while avoiding certain food triggers. At the patient's last visit: the patient notes that she continues to have occasional watery stools which she describes occurs on \"clearing days \".  This will happen every couple of days in which she describes urgent bowel movements with " a large amount of stool requiring multiple trips to the bathroom.  When this occurs, she feels as though it has drained her energy.  Will also have abdominal discomfort associated with this which is slightly alleviated with a heating pad.  On other days, she will have a more solid bowel movement which she describes as a coil.  She plans to reschedule around having a bowel movement and her daily routine that involves exercise, drinking hot water, and guided imagery in order to have 1-2 satisfying bowel movements.  Continues to follow low FODMAP diet.  She continues to take amities of 5 tablets a day, occasional senna, and daily Colace. On average, the patient will take 12-15 creon tablets a day.     Last year she reports she typically has 2 bowel movements a day and denies significant constipation. Has cut out Senna, and is taking colace, Amitiza (5 tablets a day), magnesium oxide 200 mg. Is also taking Creon 12-15 tablets daily. Will have 1 episode of diarrhea a week which is stable from before- when this happens she will have very loose stools and urgency. Also notes increased bloating when consuming gluten.     GERD, Dysphagia   Summarized/taken from prior documentation   Patient experiences GERD as substernal and throat burning with nocturnal relaxant causing choking. She had previously followed with Dr. George Flores in out clinic and reports a prior Schatzki's ring requiring previous dilation. Manometry was noral, and pH impedence had a DeMeester of 24 with positive symptoms association. Patient has treated GERD symptoms with BID PPI, Carafate, H2 blocker, and tums. At her last visit, she had been experiencing heartburn and has restarted omeprazole 40 mg daily with continued symptoms with specific food triggers. At the patient's last visit, she was instructed to start zantac 300 mg a day.     Last year she notes that since adding zantac, GERD has been well controlled. Is also taking daily omeprazole. Sometimes  will note a deep/hoarse voice, this happens in the morning.     Interval History 12/18/2020:  Patient reports that she had been feeling pretty well up until early November She had been working on being a vegetarian.  Early November the patient noted worsening symptoms.  He was seen at the [facility] emergency department on November 18.  At that time, blood work notable for normal BMP, low lipase, unremarkable hepatic panel.  CBC without elevation of WBCs though she did have elevated platelets, neutrophils and lymphocytes.  CT at that time with moderate stool burden, nonspecific fluid in small intestine.  No evidence of obstruction per documentation. Had similar episodes after this.     Today she reports that she continues to have pain on her right side radiating to her back.  This has improved with juicing foods.  States that she has 6-7 small meals a day with about a cup of food at each meal.  States that foods such as toast, bread, gluten seem to go better than healthier foods.  Is having a difficult time balancing foods that she enjoys eating, foods that cause symptoms and foods that are healthy.  Will have nausea vomiting and pain with food triggers.  Is using as needed Phenergan. she also reports some nausea when bending down, and discomfort when laying on her belly.  Does report she has had increased eructation, feels like she can feel and watch food move throughout her abdomen.  Does not have foul-smelling gas that she has had in the past with SIBO.    Is currently taking max dose magnesium oxide in addition to Amitiza.  With this she is having regular bowel movements that are urgent.  Today she had 4 bowel movements.  Said they are formed, long, no melena or hematochezia.  Continues to take Creon.  Has reduced the dose to about 1 per small meal.    Patient reports he has had increased stress recently with the help of her son.  He does exercise regularly, meditates, and is established with a  therapist.    -- emergency plan with clean-out   -- motegrity   -- could consider course of erythromycin for a couple of weeks ( mg orally two times a day)   -- benderyl-   -- add the magnesium back in   -- full liquid diet and don't go back until she feels paul r    Interval History 1/15/2021:  Has been tracking symptoms with an beatris. When the urge comes to have a bowel movement, she will need to go very quickly. No recent incontinence. With diarrhea she feels totally empty. Can have sharp stabbing pain before bowel movements.     She has been tracking stool consistency. This will happen 2-3 weeks.   3% of time sausage with cracks  17% nothing  30% mushy consistency   31% soft stool  11% diarrhea     About 10 days ago stopped taking anti-nausea medications. The small meals is really helping. Also decreased dose omeprazole (40 mg) and famotidine once a day in the morning. No vomiting since [place].    A piece a toast w/ peanut butter is going ok.     + weight down to 150 lbs. Energy has increased, is able to bike at least 30 minutes up to 45 minutes.     ROS:    Has lost some weight recently, was previously 158 pounds now she is at 154    PROBLEM LIST  Patient Active Problem List    Diagnosis Date Noted     Iron deficiency anemia 03/22/2019     Priority: Medium     Headache, chronic migraine without aura 09/18/2018     Priority: Medium     Chronic pain syndrome 09/18/2018     Priority: Medium     S/P hernia repair 08/23/2018     Priority: Medium     Incisional hernia, without obstruction or gangrene 05/20/2018     Priority: Medium     Adhesive capsulitis of shoulder, unspecified laterality 11/14/2017     Priority: Medium     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 06/26/2017     Priority: Medium     Gastroparesis      Priority: Medium     ACP (advance care planning) 03/28/2017     Priority: Medium     Advance Care Planning 3/28/2017: Receipt of ACP document:  Received: Health Care Directive which was witnessed or  notarized on 12/8/16.  Document previously scanned on 1/12/17.  Validation form completed and scanned.  Code Status reflects choices in most recent ACP document..  Confirmed/documented designated decision maker(s).  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 Provider to review and confirm.       GERD (gastroesophageal reflux disease) 12/01/2010     Priority: Medium     Lumbago 04/18/2005     Priority: Medium     History of L5-S1 degenerative disk disease.         Sensorineural hearing loss 01/10/2005     Priority: Medium     Problem list name updated by automated process. Provider to review       Vertiginous syndrome and labyrinthine disorder 01/10/2005     Priority: Medium     Problem list name updated by automated process. Provider to review  IMO Regulatory Load OCT 2020       Sprain and strain of other specified sites of hip and thigh 12/09/2002     Priority: Medium     Chondromalacia of patella 12/09/2002     Priority: Medium     Need for prophylactic immunotherapy      Priority: Medium     trees, grass, srw, dust mites, cat       Allergic rhinitis due to other allergen 12/21/2001     Priority: Medium     Hypothyroidism      Priority: Medium     Problem list name updated by automated process. Provider to review         PERTINENT PAST MEDICAL HISTORY:  Past Medical History:   Diagnosis Date     Allergic rhinitis, cause unspecified      Allergy to other foods     strawberries, apples, celeries, alice, watermelon     Arthritis     left knee     Choledocholithiasis     long after cholecystectomy     Chronic  abdominal pain      Chronic constipation      Chronic nausea      Chronic pancreatitis (H)      Degeneration of lumbar or lumbosacral intervertebral disc      Esophageal reflux     w/ hiatal hernia     Gastroparesis      Hiatal hernia      History of pituitary adenoma     s/p resection     Hypothyroidism      Migraines      Mild hyperlipidemia      On tube feeding diet     presence of GJ tube     Pancreatic disease     PD stricture, 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 endometriosis growth     APPENDECTOMY       C  DELIVERY ONLY       C  DELIVERY ONLY      repeat c section with incidental cystotomy with repair     C EXCIS PITUITARY,TRANSNASAL/SEPTAL      pituitary tumor removed for diabetes insipidus     C TOTAL ABDOM HYSTERECTOMY      w/ bilateral salpingoophorectomy       SECTION       COLONOSCOPY       ENDOSCOPIC RETROGRADE CHOLANGIOPANCREATOGRAM N/A 2015    Procedure: ENDOSCOPIC RETROGRADE CHOLANGIOPANCREATOGRAM;  Surgeon: Mandeep Park MD;  Location:  OR     ENDOSCOPIC RETROGRADE CHOLANGIOPANCREATOGRAM N/A 2016    Procedure: COMBINED ENDOSCOPIC RETROGRADE CHOLANGIOPANCREATOGRAPHY, PLACE TUBE/STENT;  Surgeon: Mandeep Park MD;  Location:  OR     ENDOSCOPIC RETROGRADE CHOLANGIOPANCREATOGRAM N/A 3/17/2016    Procedure: COMBINED ENDOSCOPIC RETROGRADE CHOLANGIOPANCREATOGRAPHY, REMOVE FOREIGN BODY OR STENT/TUBE;  Surgeon: Mandeep Park MD;  Location:  OR     ENDOSCOPIC RETROGRADE CHOLANGIOPANCREATOGRAM N/A 2016    Procedure: COMBINED ENDOSCOPIC RETROGRADE CHOLANGIOPANCREATOGRAPHY, PLACE TUBE/STENT;  Surgeon: Mandeep Park MD;  Location:  OR      ENDOSCOPIC RETROGRADE CHOLANGIOPANCREATOGRAM N/A 8/26/2016    Procedure: COMBINED ENDOSCOPIC RETROGRADE CHOLANGIOPANCREATOGRAPHY, REMOVE FOREIGN BODY OR STENT/TUBE;  Surgeon: Mandeep Park MD;  Location: [facility] OR     ENDOSCOPIC ULTRASOUND UPPER GASTROINTESTINAL TRACT (GI) N/A 10/3/2016    Procedure: ENDOSCOPIC ULTRASOUND, ESOPHAGOSCOPY / UPPER GASTROINTESTINAL TRACT (GI);  Surgeon: Guru Jose Rodas MD;  Location: [facility] OR     ESOPHAGOSCOPY, GASTROSCOPY, DUODENOSCOPY (EGD), COMBINED N/A 6/24/2015    Procedure: COMBINED ESOPHAGOSCOPY, GASTROSCOPY, DUODENOSCOPY (EGD), REMOVE FOREIGN BODY;  Surgeon: Mandeep Park MD;  Location: [facility] GI     ESOPHAGOSCOPY, GASTROSCOPY, DUODENOSCOPY (EGD), COMBINED N/A 10/25/2015    Procedure: COMBINED ESOPHAGOSCOPY, GASTROSCOPY, DUODENOSCOPY (EGD);  Surgeon: Sammy Amaro MD;  Location: [facility] GI     ESOPHAGOSCOPY, GASTROSCOPY, DUODENOSCOPY (EGD), COMBINED N/A 10/25/2015    Procedure: COMBINED ESOPHAGOSCOPY, GASTROSCOPY, DUODENOSCOPY (EGD), BIOPSY SINGLE OR MULTIPLE;  Surgeon: Sammy Amaro MD;  Location: [facility] GI     ESOPHAGOSCOPY, GASTROSCOPY, DUODENOSCOPY (EGD), DILATATION, COMBINED       EXCISE LESION TRUNK N/A 4/17/2017    Procedure: EXCISE LESION TRUNK;  Removal of Abdominal Foreign Body;  Surgeon: Nestor Phoenix MD;  Location: [facility] OR     HC ESOPH/GAS REFLUX TEST W NASAL IMPED >1 HR N/A 11/19/2015    Procedure: ESOPHAGEAL IMPEDENCE FUNCTION TEST WITH 24 HOUR PH GREATER THAN 1 HOUR;  Surgeon: Thiago Apple MD;  Location: [facility] GI     HC UGI ENDOSCOPY DIAG W BIOPSY  9/17/08     HC UGI ENDOSCOPY DIAG W BIOPSY  9/27/12     HC UGI ENDOSCOPY W ESOPHAGEAL DILATION BALLOON <30MM  9/17/08     HC UGI ENDOSCOPY W EUS N/A 5/5/2015    Procedure: COMBINED ENDOSCOPIC ULTRASOUND, ESOPHAGOSCOPY, GASTROSCOPY, DUODENOSCOPY (EGD);  Surgeon: Wm Dueñas MD;  Location: [facility] GI     HC WRIST ARTHROSCOP,RELEASE XVERS LIG Bilateral 12/17/08     INJECT  TRANSVERSUS ABDOMINIS PLANE (TAP) BLOCK BILATERAL Left 9/22/2016    Procedure: INJECT TRANSVERSUS ABDOMINIS PLANE (TAP) BLOCK BILATERAL;  Surgeon: Dickson Corrigan MD;  Location: [facility] OR     laparoscopic pineda  1995     LAPAROSCOPIC HERNIORRHAPHY INCISIONAL N/A 8/23/2018    Procedure: LAPAROSCOPIC HERNIORRHAPHY INCISIONAL;  Laparoscopic Incisional Hernia Repair with Symbotex Mesh Implant;  Surgeon: Nestor Phoenix MD;  Location: [facility] OR     LAPAROSCOPIC PANCREATECTOMY, TRANSPLANT AUTO ISLET CELL N/A 12/28/2016    Procedure: LAPAROSCOPIC PANCREATECTOMY, TRANSPLANT AUTO ISLET CELL;  Surgeon: Nestor Phoenix MD;  Location: [facility] OR     transphenoidal pituitary resection  1990       ALLERGIES:  Allergies   Allergen Reactions     Apple Anaphylaxis     Corticosteroids Other (See Comments)     All oral, IV and injectable steroids are contraindicated (unless in life threatening situations) in [facility] transplant recipients. They can cause irreversible loss of islet cell function. Please contact patient's transplant care coordinator ADI Gaffney RN at [phone]/pager [phone] and/or endocrinologist prior to administration.       Depakote [Divalproex Sodium] Other (See Comments)     Chest pain     Zithromax [Azithromycin Dihydrate] Anaphylaxis     Noted in 4/7/08 ER     Darvocet [Propoxyphene N-Apap] Itching     Morphine Nausea and Vomiting and Rash     Nalbuphine Hcl Rash     RASH :nubaine     Zosyn [Piperacillin-Tazobactam In D5w] Rash     Possible allergy, did have a diffuse rash that seemed drug related but could have also been related to soap in the hospital.      Bactrim [Sulfamethoxazole W-Trimethoprim] Other (See Comments) and Nausea and Vomiting     Severely low liver function.     Cats      Compazine [Prochlorperazine] Other (See Comments)     Twitching. Takes Benedryl and is fine     Dust Mites      Grass      Prednisone Other (See Comments)     Insomnia       Ragweeds      Tape [Adhesive Tape] Blisters      Tramadol      Trees      Zofran [Ondansetron] Other (See Comments)     migraine     Flagyl [Metronidazole] Hives and Rash       PERTINENT MEDICATIONS:    Current Outpatient Medications:      acetaminophen 500 MG CAPS, Take 2 mg by mouth 2 times daily, Disp: , Rfl:      amitriptyline (ELAVIL) 10 MG tablet, Take 5 tablets (50 mg) by mouth At Bedtime Take 40 mg at bedtime, Disp: 150 tablet, Rfl: 11     amylase-lipase-protease (CREON 24) 97591-53669 units CPEP per EC capsule, Take 3-4 capsules by mouth with meals and 1-2 with snacks. Maximum 15 capsules per day., Disp: 450 capsule, Rfl: 11     aspirin 81 MG EC tablet, Take 1 tablet (81 mg) by mouth daily, Disp: 90 tablet, Rfl: 3     azelastine (ASTELIN) 0.1 % nasal spray, Spray 1 spray into both nostrils 2 times daily, Disp: 1 Bottle, Rfl: 11     blood glucose (PAT CONTOUR NEXT) test strip, Use to test blood sugar 6 times daily or as directed., Disp: 2 Box, Rfl: 11     blood glucose monitoring (PAT MICROLET) lancets, Use to test blood sugar 6-8 times daily or as directed., Disp: 100 each, Rfl: 11     calcium carbonate-vitamin D (CALCIUM 500/D) 500-200 MG-UNIT tablet, , Disp: , Rfl:      cetirizine (ZYRTEC) 10 MG tablet, Take 1 tablet (10 mg) by mouth daily, Disp: 90 tablet, Rfl: 3     diphenhydrAMINE (BENADRYL ALLERGY) 25 MG capsule, Take 1 capsule (25 mg) by mouth every 6 hours as needed for itching or allergies, Disp: 56 capsule, Rfl: 0     docusate sodium (COLACE) 100 MG capsule, Take 1 capsule (100 mg) by mouth 2 times daily as needed for constipation,, Disp: 60 capsule, Rfl: 0     estradiol (VAGIFEM) 10 MCG TABS vaginal tablet, Place 1 tablet (10 mcg) vaginally twice a week, Disp: 24 tablet, Rfl: 3     famotidine (PEPCID) 20 MG tablet, Take 1 tablet (20 mg) by mouth 2 times daily, Disp: 90 tablet, Rfl: 3     fish oil-omega-3 fatty acids 1000 MG capsule, , Disp: , Rfl:      FOLIC ACID PO, Take 1 mg by mouth daily, Disp: , Rfl:      glucose 40 % GEL gel, Take  15-30 g by mouth every 15 minutes as needed for low blood sugar, Disp: 3 Tube, Rfl: 2     ibuprofen (ADVIL/MOTRIN) 400 MG tablet, Take 1 tablet (400 mg) by mouth every 6 hours as needed for moderate pain, Disp: 30 tablet, Rfl: 0     levothyroxine (SYNTHROID) 137 MCG tablet, Take 1 tablet (137 mcg) by mouth daily, Disp: 90 tablet, Rfl: 3     lubiprostone (AMITIZA) 8 MCG capsule, Take 3 capsules in AM and 2 capsules in PM  More refills after virtual visit with Ms Marti, Disp: 150 capsule, Rfl: 11     magnesium oxide 200 MG TABS, 1,000 mg , Disp: , Rfl:      montelukast (SINGULAIR) 10 MG tablet, TAKE 1 TABLET(10 MG) BY MOUTH AT BEDTIME, Disp: 90 tablet, Rfl: 3     multivitamin CF formula (CHOICEFUL) capsule, Take 1 tablet by mouth daily, Disp: , Rfl: 11     Nutritional Supplements (BOOST HIGH PROTEIN) LIQD, After above baseline labs are drawn, give: 6 mL/kg to maximum of 360 mL; the beverage is to be consumed within 5 minutes., Disp: , Rfl: 0     omeprazole (PRILOSEC) 40 MG DR capsule, Take 1 capsule (40 mg) by mouth 2 times daily, Disp: 180 capsule, Rfl: 3     order for DME, Equipment being ordered:Cefaly, Disp: 1 Device, Rfl: 0     prochlorperazine (COMPAZINE) 5 MG tablet, Take two 5 mg tablets by mouth every six hours as needed for nausea., Disp: 90 tablet, Rfl: 3     promethazine (PHENERGAN) 25 MG tablet, Take 1 tablet (25 mg) by mouth At Bedtime, Disp: 90 tablet, Rfl: 3     senna-docusate (SENOKOT-S;PERICOLACE) 8.6-50 MG per tablet, Take 1-2 tablets by mouth 2 times daily, Disp: 100 tablet, Rfl: 0     Sharps Container (BD SHARPS ) MISC, 1 Container as needed, Disp: 1 each, Rfl: 1     ZOLMitriptan (ZOMIG) 5 MG tablet, Take 1 tablet (5 mg) by mouth at onset of headache for migraine, Disp: 9 tablet, Rfl: 3    SOCIAL HISTORY:  Social History     Socioeconomic History     Marital status:      Spouse name: Norris     Number of children: 3     Years of education: 16     Highest education level: Not on  file   Occupational History     Occupation: director     Employer: [employer]   Social Needs     Financial resource strain: Not on file     Food insecurity     Worry: Not on file     Inability: Not on file     Transportation needs     Medical: Not on file     Non-medical: Not on file   Tobacco Use     Smoking status: Former Smoker     Packs/day: 0.50     Years: 6.00     Pack years: 3.00     Types: Cigarettes     Start date: 1985     Quit date: 1992     Years since quittin.0     Smokeless tobacco: Never Used     Tobacco comment: no 2nd hand   Substance and Sexual Activity     Alcohol use: Yes     Alcohol/week: 3.0 - 6.0 standard drinks     Types: 1 - 2 Glasses of wine, 1 - 2 Cans of beer, 1 - 2 Shots of liquor per week     Comment: occasional     Drug use: No     Sexual activity: Yes     Partners: Male     Birth control/protection: None     Comment: Hystectomy 1999   Lifestyle     Physical activity     Days per week: Not on file     Minutes per session: Not on file     Stress: Not on file   Relationships     Social connections     Talks on phone: Not on file     Gets together: Not on file     Attends [employer] service: Not on file     Active member of club or organization: Not on file     Attends meetings of clubs or organizations: Not on file     Relationship status: Not on file     Intimate partner violence     Fear of current or ex partner: Not on file     Emotionally abused: Not on file     Physically abused: Not on file     Forced sexual activity: Not on file   Other Topics Concern     Parent/sibling w/ CABG, MI or angioplasty before 65F 55M? No      Service Not Asked     Blood Transfusions No     Caffeine Concern Not Asked     Occupational Exposure Not Asked     Hobby Hazards Not Asked     Sleep Concern Not Asked     Stress Concern Not Asked     Weight Concern Not Asked     Special Diet Not Asked     Back Care Not Asked     Exercise Not Asked     Bike Helmet Not Asked     Seat Belt Yes      Self-Exams Not Asked   Social History Narrative     with 3 children and a dog.  No smoking, etoh or drug use.  Worked as a  for [employer] in [place] in the past.  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       FAMILY HISTORY:  Family History   Problem Relation Age of Onset     Eye Disorder Father         cataract, detached retina     Myocardial Infarction Father 60     Lipids Father      Cerebrovascular Disease Father      Depression Father      Substance Abuse Father      Anesthesia Reaction Father         stroke right after surgery     Cataracts Father      Osteoarthritis Father      Ulcerative Colitis Father      Lipids Mother      Hypertension Mother      Thyroid Disease Mother      Depression Mother      Angina Mother      GERD Mother      Skin Cancer Mother      Eye Disorder Son         ptosis     Eye Disorder Paternal Grandmother         cataract     Eye Disorder Paternal Grandfather         cataract     Diabetes Paternal Grandfather      Eye Disorder Maternal Grandmother         cataract     Thyroid Disease Maternal Grandmother      Coronary Artery Disease Sister         angioplasty     GERD Sister      Substance Abuse Sister      Depression Sister      Depression Son      Anxiety Disorder Son      Thyroid Disease Sister      Diabetes Maternal Grandfather      Depression Nephew      Anxiety Disorder Nephew      Thyroid Disease Nephew      Diabetes Type 2  Cousin         paternal cousin     Heart Disease Paternal Aunt      Diabetes Paternal Aunt      Diabetes Paternal Uncle      Heart Disease Paternal Uncle      Past/family/social history reviewed and no changes    PHYSICAL EXAMINATION:  General appearance: Healthy appearing adult, in no acute distress  Eyes: Sclera anicteric, Pupils round and reactive to light  Ears, nose, mouth and throat: No obvious external lesions of ears and nose, Hearing intact  Neck: Symmetric, No obvious external  lesions  Respiratory: Normal respiration, no use of accessory muscles   Skin: No rashes or jaundice   Psychiatric: Oriented to person, place and time, Appropriate mood and affect.       PERTINENT STUDIES:  Orders Only on 09/13/2018   Component Date Value Ref Range Status     WBC 09/13/2018 5.8  4.0 - 11.0 10e9/L Final     RBC Count 09/13/2018 4.51  3.8 - 5.2 10e12/L Final     Hemoglobin 09/13/2018 14.1  11.7 - 15.7 g/dL Final     Hematocrit 09/13/2018 42.1  35.0 - 47.0 % Final     MCV 09/13/2018 93  78 - 100 fl Final     MCH 09/13/2018 31.3  26.5 - 33.0 pg Final     MCHC 09/13/2018 33.5  31.5 - 36.5 g/dL Final     RDW 09/13/2018 13.2  10.0 - 15.0 % Final     Platelet Count 09/13/2018 430  150 - 450 10e9/L Final     Diff Method 09/13/2018 Automated Method   Final     % Neutrophils 09/13/2018 37.1  % Final     % Lymphocytes 09/13/2018 40.5  % Final     % Monocytes 09/13/2018 10.5  % Final     % Eosinophils 09/13/2018 9.8  % Final     % Basophils 09/13/2018 2.1  % Final     % Immature Granulocytes 09/13/2018 0.0  % Final     Nucleated RBCs 09/13/2018 0  0 /100 Final     Absolute Neutrophil 09/13/2018 2.2  1.6 - 8.3 10e9/L Final     Absolute Lymphocytes 09/13/2018 2.4  0.8 - 5.3 10e9/L Final     Absolute Monocytes 09/13/2018 0.6  0.0 - 1.3 10e9/L Final     Absolute Eosinophils 09/13/2018 0.6  0.0 - 0.7 10e9/L Final     Absolute Basophils 09/13/2018 0.1  0.0 - 0.2 10e9/L Final     Abs Immature Granulocytes 09/13/2018 0.0  0 - 0.4 10e9/L Final     Absolute Nucleated RBC 09/13/2018 0.0   Final     Sodium 09/13/2018 138  133 - 144 mmol/L Final     Potassium 09/13/2018 4.3  3.4 - 5.3 mmol/L Final     Chloride 09/13/2018 102  94 - 109 mmol/L Final     Carbon Dioxide 09/13/2018 30  20 - 32 mmol/L Final     Anion Gap 09/13/2018 5  3 - 14 mmol/L Final     Glucose 09/13/2018 119* 70 - 99 mg/dL Final     Urea Nitrogen 09/13/2018 12  7 - 30 mg/dL Final     Creatinine 09/13/2018 0.83  0.52 - 1.04 mg/dL Final     GFR Estimate  09/13/2018 72  >60 mL/min/1.7m2 Final     GFR Estimate If Black 09/13/2018 87  >60 mL/min/1.7m2 Final     Calcium 09/13/2018 9.2  8.5 - 10.1 mg/dL Final     Bilirubin Direct 09/13/2018 0.2  0.0 - 0.2 mg/dL Final     Bilirubin Total 09/13/2018 0.8  0.2 - 1.3 mg/dL Final     Albumin 09/13/2018 3.9  3.4 - 5.0 g/dL Final     Protein Total 09/13/2018 7.8  6.8 - 8.8 g/dL Final     Alkaline Phosphatase 09/13/2018 145  40 - 150 U/L Final     ALT 09/13/2018 59* 0 - 50 U/L Final     AST 09/13/2018 39  0 - 45 U/L Final     IGG 09/13/2018 1160  695 - 1620 mg/dL Final     IgG1 09/13/2018 416  300 - 856 mg/dL Final     IgG2 09/13/2018 384  158 - 761 mg/dL Final     IgG3 09/13/2018 83  24 - 192 mg/dL Final     IgG4 09/13/2018 95* 11 - 86 mg/dL Final

## 2021-01-15 ENCOUNTER — VIRTUAL VISIT (OUTPATIENT)
Dept: GASTROENTEROLOGY | Facility: CLINIC | Age: 56
End: 2021-01-15
Payer: COMMERCIAL

## 2021-01-15 VITALS — BODY MASS INDEX: 22.73 KG/M2 | HEIGHT: 68 IN | WEIGHT: 150 LBS

## 2021-01-15 DIAGNOSIS — K31.84 GASTROPARESIS: Primary | ICD-10-CM

## 2021-01-15 DIAGNOSIS — R11.0 NAUSEA: ICD-10-CM

## 2021-01-15 PROCEDURE — 99214 OFFICE O/P EST MOD 30 MIN: CPT | Mod: 95 | Performed by: PHYSICIAN ASSISTANT

## 2021-01-15 ASSESSMENT — MIFFLIN-ST. JEOR: SCORE: 1324.46

## 2021-01-15 NOTE — PROGRESS NOTES
"Dinora Mcghee is a 55 year old female who is being evaluated via a billable video visit.      The patient has been notified of following:     \"This video visit will be conducted via a call between you and your physician/provider. We have found that certain health care needs can be provided without the need for an in-person physical exam.  This service lets us provide the care you need with a video conversation.  If a prescription is necessary we can send it directly to your pharmacy.  If lab work is needed we can place an order for that and you can then stop by our lab to have the test done at a later time.    If during the course of the call the physician/provider feels a video visit is not appropriate, you will not be charged for this service.\"     Patient confirmed that they are in [place] for today's visit yes.    Video-Visit Details  Type of service:  Video Visit    Video Start Time: 11:03 AM  Video End Time:  11:34 AM    Originating Location (pt. Location): Home    Distant Location (provider location):  [facility] [place] (provider's home)     Platform used: Emeli Jaquez CMA        "

## 2021-01-15 NOTE — PATIENT INSTRUCTIONS
It was a pleasure taking care of you today.  I've included a brief summary of our discussion and care plan from today's visit below.  Please review this information with your primary care provider.  ______________________________________________________________________    My recommendations are summarized as follows:    --Continue Amitiza and max dose magnesium oxide  --Continue good water intake, exercise  --Follow dietary recommendations as you have discussed with Quyen.  You are doing a great job with this!  It is okay to go to a more liquid diet if symptoms worsen.  Continue to monitor your weight  --Let us know if symptoms change or worsen    Return to GI Clinic in 4-6  months to review your progress.    ______________________________________________________________________    How do I schedule labs, imaging studies, or procedures that were ordered in clinic today?   Labs: To schedule lab appointment at the [facility] [place] [facility], use my chart or call [phone]. If you have a [place] lab closer to home where you are regularly seen you can give them a call.     Procedures: If a colonoscopy, upper endoscopy, breath test, esophageal manometry, or pH impedence was ordered today, our endoscopy team will call you to schedule this. If you have not heard from our endoscopy team within a week, please call [phone] to schedule.     Imaging Studies: If you were scheduled for a CT scan, X-ray, MRI, ultrasound, HIDA scan or other imaging study, please call [phone] to have this scheduled.     Referral: If a referral to another specialty was ordered, expect a phone call or follow instructions above. If you have not heard from anyone regarding your referral in a week, please call our clinic to check the status.     Who do I call with any questions after my visit?  Please be in touch if there are any further questions that arise following today's visit.  There are multiple ways to contact your gastroenterology  care team.        During business hours, you may reach a Gastroenterology nurse at [phone]      To schedule or reschedule an appointment, please call [phone].       You can always send a secure message through [employer].  [employer] messages are answered by your nurse or doctor typically within 24 hours.  Please allow extra time on weekends and holidays.        For urgent/emergent questions after business hours, you may reach the on-call GI Fellow by contacting the [facility]  at [phone].     How will I get the results of any tests ordered?    You will receive all of your results.  If you have signed up for [employer], any tests ordered at your visit will be available to you after your physician reviews them.  Typically this takes 1-2 weeks.  If there are urgent results that require a change in your care plan, your physician or nurse will call you to discuss the next steps.      What is [employer]?  [employer] is a secure way for you to access all of your healthcare records from the [facility].  It is a web based computer program, so you can sign on to it from any location.  It also allows you to send secure messages to your care team.  I recommend signing up for [employer] access if you have not already done so and are comfortable with using a computer.      How to I schedule a follow-up visit?  If you did not schedule a follow-up visit today, please call [phone] to schedule a follow-up office visit.      Sincerely,    Rosanne Marti PA-C  Division of Gastroenterology, Hepatology & Nutrition  [facility]

## 2021-01-15 NOTE — NURSING NOTE
"Chief Complaint   Patient presents with     Follow Up       Vitals:    01/15/21 1038   Weight: 68 kg (150 lb)   Height: 1.72 m (5' 7.72\")       Body mass index is 23 kg/m .    Saundra Jaquez CMA    "

## 2021-01-15 NOTE — LETTER
1/15/2021         RE: Dinora Mcghee  [address] [facility] [address]        Dear Colleague,    Thank you for referring your patient, Dinora Mcghee, to the [facility] GASTROENTEROLOGY [facility]INIC [place]. Please see a copy of my visit note below.    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     1.  Abdominal pain  Patient reports worsening abdominal pain, nausea and vomiting starting a little over a month ago.  Of note, had been working on a more vegan diet around the time.  Was evaluated in the emergency department with unremarkable CT scan and lab work-up.  Recently had small bowel follow-through without evidence of stricture or obstruction.  Patient reports symptoms improved with dietary changes as has been discussed with our GI dietitian.  Would recommend that she continues to track symptoms, can follow-up with general if she has further questions.  If her symptoms worsen significantly, would recommend going to a primarily liquid diet.     Stool pattern continues to be variable with occasional days of emptying.  Would recommend continuing magnesium and Amitiza at this time.  If concern for increased constipation, could evaluate with abdominal x-ray for stool burden.  Suspect that symptoms are due to exacerbation of underlying gastroparesis.    If gastroparesis symptoms worsen significantly, future considerations include 2 weeks of erythromycin or Motegrity.    --Continue Amitiza and max dose magnesium  oxide  --Continue good water intake, exercise  --Follow dietary recommendations as you have discussed with Quyen.  You are doing a great job with this!  It is okay to go to a more liquid diet if symptoms worsen.  Continue to monitor your weight  --Let us know if symptoms change or worsen    RTC 4-6 months     33 Minutes was spent on the date of the encounter during chart review, history and exam, documentation, and further activities as noted       Note completed using voice recognition software. Some word and grammatical errors may occur.    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     Summarized from previous documentation with Dr. Genao and I, please see previous notes for further details     Gastroparesis  Gastric emptying study with 2-hour study at [facility] have been consistent with gastroparesis, patient had follow-up study here 6/27/2016 with 49% remaining at 4 hours however the patient was not aware she was on narcotics at the time.  Patient had previous GJ tubes in fall 2016, NG tube used for venting with tube feeds and was able to wean off of tube feeds after TPA T.  At initial visit she reported severe migraine headaches associated with vomiting and is on amitriptyline for migraine prophylaxis.  Patient has used several medication for the symptoms including scopolamine patch, pro-chloro para Rufino, promethazine which helped symptoms.  She has also been seen by neurology and psychology due to concern that she has a difficult  time sleeping and with migraines.  At her last visit, patient had been working with dietition to increase caloric intake and was maintaining weight around 150 lbs. She reported occasional nausea without vomiting, with 1 episode in the last month. Symptoms may have been worsened due to anxiety regarding surgery and ativan had helped.  At the patient's last visit:  the patient reports that since taking the Phenergan at night, her nausea and vomiting has improved significantly and she is not waking up as often.  She does report an episode around the holidays in which he vomited 5-6 times however thinks this may be due to a GI bug.  Now she reports that she has nausea about 2 times a week depending on the week.  Is also taking Compazine as needed and no longer taking Zofran.  She also understands that blood sugar may play a role in her nausea therefore she will check her blood sugar when nauseous.    Last year: The patient notes improvement in her symptoms if she waits to eat breakfast until 10-11 in the morning. She continues to take compazine in the morning, and phenergan at night. Still will experience vomiting, especially if she does not take phenergan at night. Nausea/vomiting worsens with migraines which have improved while avoiding gluten and increasing fruits and vegetables (2 days a month instead of 4 times a week). Also has cut back on meat and aims to eat more chicken and fish.     Constipation/irregular bowel movements/bloating  Patient reports history of ongoing constipation for over 20 years after having her first child.  She has tried multiple interventions including a bowel cleanout, MiraLAX, senna, milk of magnesium, Linzess, and Amitiza for which she was on when she first came to [facility] and  GI clinic.  Patient also takes creon.  Patient has done a course of rifaximin in the past with with improvement in bloating and stool consistency.  Patient has also been on specific SIBO/FODMAP diets to the [facility] while avoiding certain food triggers. At the patient's last visit: the patient notes that she continues to have occasional watery stools which she describes occurs on \"clearing days \".  This will happen every couple of days in which she describes urgent bowel movements with a large amount of stool requiring multiple trips to the bathroom.  When this occurs, she feels as though it has drained her energy.  Will also have abdominal discomfort associated with this which is slightly alleviated with a heating pad.  On other days, she will have a more solid bowel movement which she describes as a coil.  She plans to reschedule around having a bowel movement and her daily routine that involves exercise, drinking hot water, and guided imagery in order to have 1-2 satisfying bowel movements.  Continues to follow low FODMAP diet.  She continues to take amities of 5 tablets a day, occasional senna, and daily Colace. On average, the patient will take 12-15 creon tablets a day.     Last year she reports she typically has 2 bowel movements a day and denies significant constipation. Has cut out Senna, and is taking colace, Amitiza (5 tablets a day), magnesium oxide 200 mg. Is also taking Creon 12-15 tablets daily. Will have 1 episode of diarrhea a week which is stable from before- when this happens she will have very loose stools and urgency. Also notes increased bloating when consuming gluten.     GERD, Dysphagia   Summarized/taken from prior documentation   Patient experiences GERD as substernal and throat burning with nocturnal relaxant causing choking. She had previously followed with Dr. George Flores in out clinic and reports a prior Schatzki's ring requiring previous dilation. Manometry was noral, and pH impedence had a DeMeester of 24 with positive symptoms association. Patient has treated GERD symptoms with BID PPI, Carafate, H2 blocker, and tums. At her last visit, she had been experiencing heartburn and has " restarted omeprazole 40 mg daily with continued symptoms with specific food triggers. At the patient's last visit, she was instructed to start zantac 300 mg a day.     Last year she notes that since adding zantac, GERD has been well controlled. Is also taking daily omeprazole. Sometimes will note a deep/hoarse voice, this happens in the morning.     Interval History 12/18/2020:  Patient reports that she had been feeling pretty well up until early November She had been working on being a vegetarian.  Early November the patient noted worsening symptoms.  He was seen at the [facility] emergency department on November 18.  At that time, blood work notable for normal BMP, low lipase, unremarkable hepatic panel.  CBC without elevation of WBCs though she did have elevated platelets, neutrophils and lymphocytes.  CT at that time with moderate stool burden, nonspecific fluid in small intestine.  No evidence of obstruction per documentation. Had similar episodes after this.     Today she reports that she continues to have pain on her right side radiating to her back.  This has improved with juicing foods.  States that she has 6-7 small meals a day with about a cup of food at each meal.  States that foods such as toast, bread, gluten seem to go better than healthier foods.  Is having a difficult time balancing foods that she enjoys eating, foods that cause symptoms and foods that are healthy.  Will have nausea vomiting and pain with food triggers.  Is using as needed Phenergan. she also reports some nausea when bending down, and discomfort when laying on her belly.  Does report she has had increased eructation, feels like she can feel and watch food move throughout her abdomen.  Does not have foul-smelling gas that she has had in the past with SIBO.    Is currently taking max dose magnesium oxide in addition to Amitiza.  With this she is having regular bowel movements that are urgent.  Today she had 4 bowel movements.   Said they are formed, long, no melena or hematochezia.  Continues to take Creon.  Has reduced the dose to about 1 per small meal.    Patient reports he has had increased stress recently with the help of her son.  He does exercise regularly, meditates, and is established with a therapist.    -- emergency plan with clean-out   -- motegrity   -- could consider course of erythromycin for a couple of weeks ( mg orally two times a day)   -- benderyl-   -- add the magnesium back in   -- full liquid diet and don't go back until she feels paul r    Interval History 1/15/2021:  Has been tracking symptoms with an beatris. When the urge comes to have a bowel movement, she will need to go very quickly. No recent incontinence. With diarrhea she feels totally empty. Can have sharp stabbing pain before bowel movements.     She has been tracking stool consistency. This will happen 2-3 weeks.   3% of time sausage with cracks  17% nothing  30% mushy consistency   31% soft stool  11% diarrhea     About 10 days ago stopped taking anti-nausea medications. The small meals is really helping. Also decreased dose omeprazole (40 mg) and famotidine once a day in the morning. No vomiting since Paulette.    A piece a toast w/ peanut butter is going ok.     + weight down to 150 lbs. Energy has increased, is able to bike at least 30 minutes up to 45 minutes.     ROS:    Has lost some weight recently, was previously 158 pounds now she is at 154    PROBLEM LIST  Patient Active Problem List    Diagnosis Date Noted     Iron deficiency anemia 03/22/2019     Priority: Medium     Headache, chronic migraine without aura 09/18/2018     Priority: Medium     Chronic pain syndrome 09/18/2018     Priority: Medium     S/P hernia repair 08/23/2018     Priority: Medium     Incisional hernia, without obstruction or gangrene 05/20/2018     Priority: Medium     Adhesive capsulitis of shoulder, unspecified laterality 11/14/2017     Priority: Medium     IgG4  selectively high in plasma 06/26/2017     Priority: Medium     Gastroparesis      Priority: Medium     ACP (advance care planning) 03/28/2017     Priority: Medium     Advance Care Planning 3/28/2017: Receipt of ACP document:  Received: Health Care Directive which was witnessed or notarized on 12/8/16.  Document previously scanned on 1/12/17.  Validation form completed and scanned.  Code Status reflects choices in most recent ACP document..  Confirmed/documented designated decision maker(s).  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 Provider to review and confirm.       GERD (gastroesophageal reflux disease) 12/01/2010     Priority: Medium     Lumbago 04/18/2005     Priority: Medium     History of L5-S1 degenerative disk disease.         Sensorineural hearing loss 01/10/2005     Priority: Medium     Problem list name updated by automated process. Provider to review       Vertiginous syndrome and labyrinthine disorder 01/10/2005     Priority: Medium     Problem list name updated by automated process. Provider to review  IMO Regulatory Load OCT 2020       Sprain and strain of other specified sites of hip and thigh 12/09/2002     Priority: Medium     Chondromalacia of patella 12/09/2002     Priority: Medium     Need for prophylactic immunotherapy      Priority: Medium     trees, grass, srw, dust mites, cat       Allergic rhinitis due to other allergen 12/21/2001     Priority: Medium     Hypothyroidism      Priority: Medium     Problem list name updated by automated process. Provider to review         PERTINENT PAST  MEDICAL HISTORY:  Past Medical History:   Diagnosis Date     Allergic rhinitis, cause unspecified      Allergy to other foods     strawberries, apples, celeries, alice, watermelon     Arthritis     left knee     Choledocholithiasis     long after cholecystectomy     Chronic abdominal pain      Chronic constipation      Chronic nausea      Chronic pancreatitis (H)      Degeneration of lumbar or lumbosacral intervertebral disc      Esophageal reflux     w/ hiatal hernia     Gastroparesis      Hiatal hernia      History of pituitary adenoma     s/p resection     Hypothyroidism      Migraines      Mild hyperlipidemia      On tube feeding diet     presence of GJ tube     Pancreatic disease     PD stricture, 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 endometriosis growth     APPENDECTOMY       C  DELIVERY ONLY       C  DELIVERY ONLY      repeat c section with incidental cystotomy with repair     C EXCIS PITUITARY,TRANSNASAL/SEPTAL      pituitary tumor removed for diabetes insipidus     C TOTAL ABDOM HYSTERECTOMY      w/ bilateral salpingoophorectomy       SECTION       COLONOSCOPY       ENDOSCOPIC RETROGRADE CHOLANGIOPANCREATOGRAM N/A 2015    Procedure: ENDOSCOPIC RETROGRADE CHOLANGIOPANCREATOGRAM;  Surgeon: Mandeep Park MD;  Location: [facility]     ENDOSCOPIC RETROGRADE CHOLANGIOPANCREATOGRAM N/A 2016    Procedure: COMBINED ENDOSCOPIC RETROGRADE CHOLANGIOPANCREATOGRAPHY, PLACE TUBE/STENT;  Surgeon: Mandeep Park MD;  Location: [facility]     ENDOSCOPIC RETROGRADE CHOLANGIOPANCREATOGRAM N/A 3/17/2016    Procedure: COMBINED ENDOSCOPIC RETROGRADE CHOLANGIOPANCREATOGRAPHY, REMOVE FOREIGN BODY OR  STENT/TUBE;  Surgeon: Mandeep Park MD;  Location: [facility]     ENDOSCOPIC RETROGRADE CHOLANGIOPANCREATOGRAM N/A 8/2/2016    Procedure: COMBINED ENDOSCOPIC RETROGRADE CHOLANGIOPANCREATOGRAPHY, PLACE TUBE/STENT;  Surgeon: Mandeep Park MD;  Location: [facility]     ENDOSCOPIC RETROGRADE CHOLANGIOPANCREATOGRAM N/A 8/26/2016    Procedure: COMBINED ENDOSCOPIC RETROGRADE CHOLANGIOPANCREATOGRAPHY, REMOVE FOREIGN BODY OR STENT/TUBE;  Surgeon: Mandeep Park MD;  Location: [facility]     ENDOSCOPIC ULTRASOUND UPPER GASTROINTESTINAL TRACT (GI) N/A 10/3/2016    Procedure: ENDOSCOPIC ULTRASOUND, ESOPHAGOSCOPY / UPPER GASTROINTESTINAL TRACT (GI);  Surgeon: Guru Jose Rodas MD;  Location: [facility]     ESOPHAGOSCOPY, GASTROSCOPY, DUODENOSCOPY (EGD), COMBINED N/A 6/24/2015    Procedure: COMBINED ESOPHAGOSCOPY, GASTROSCOPY, DUODENOSCOPY (EGD), REMOVE FOREIGN BODY;  Surgeon: Mandeep Park MD;  Location: [facility]     ESOPHAGOSCOPY, GASTROSCOPY, DUODENOSCOPY (EGD), COMBINED N/A 10/25/2015    Procedure: COMBINED ESOPHAGOSCOPY, GASTROSCOPY, DUODENOSCOPY (EGD);  Surgeon: Sammy Amaro MD;  [facility]     ESOPHAGOSCOPY, GASTROSCOPY, DUODENOSCOPY (EGD), COMBINED N/A 10/25/2015    Procedure: COMBINED ESOPHAGOSCOPY, GASTROSCOPY, DUODENOSCOPY (EGD), BIOPSY SINGLE OR MULTIPLE;  Surgeon: Sammy Amaro MD;  Location: [facility]     ESOPHAGOSCOPY, GASTROSCOPY, DUODENOSCOPY (EGD), DILATATION, COMBINED       EXCISE LESION TRUNK N/A 4/17/2017    Procedure: EXCISE LESION TRUNK;  Removal of Abdominal Foreign Body;  Surgeon: Nestor Phoenix MD;  [facility]     HC ESOPH/GAS REFLUX TEST W NASAL IMPED >1 HR N/A 11/19/2015    Procedure: ESOPHAGEAL IMPEDENCE FUNCTION TEST WITH 24 HOUR PH GREATER THAN 1 HOUR;  Surgeon: Thiago Apple MD;  Location: [facility]     HC UGI ENDOSCOPY DIAG W BIOPSY  9/17/08     HC UGI ENDOSCOPY DIAG W BIOPSY  9/27/12     HC UGI ENDOSCOPY W ESOPHAGEAL DILATION BALLOON  <30MM  9/17/08     HC UGI ENDOSCOPY W EUS N/A 5/5/2015    Procedure: COMBINED ENDOSCOPIC ULTRASOUND, ESOPHAGOSCOPY, GASTROSCOPY, DUODENOSCOPY (EGD);  Surgeon: Wm Dueñas MD;  Location: [facility]     HC WRIST ARTHROSCOP,RELEASE XVERS LIG Bilateral 12/17/08     INJECT TRANSVERSUS ABDOMINIS PLANE (TAP) BLOCK BILATERAL Left 9/22/2016    Procedure: INJECT TRANSVERSUS ABDOMINIS PLANE (TAP) BLOCK BILATERAL;  Surgeon: Dickson Corrigan MD;  [facility]     laparoscopic pineda  1995     LAPAROSCOPIC HERNIORRHAPHY INCISIONAL N/A 8/23/2018    Procedure: LAPAROSCOPIC HERNIORRHAPHY INCISIONAL;  Laparoscopic Incisional Hernia Repair with Symbotex Mesh Implant;  Surgeon: Nestor Phoenix MD;  [facility]: [facility]     LAPAROSCOPIC PANCREATECTOMY, TRANSPLANT AUTO ISLET CELL N/A 12/28/2016    Procedure: LAPAROSCOPIC PANCREATECTOMY, TRANSPLANT AUTO ISLET CELL;  Surgeon: Nestor Phoenix MD;  Location: [facility]     transphenoidal pituitary resection  1990       ALLERGIES:  Allergies   Allergen Reactions     Apple Anaphylaxis     Corticosteroids Other (See Comments)     All oral, IV and injectable steroids are contraindicated (unless in life threatening situations) in [facility] transplant recipients. They can cause irreversible loss of islet cell function. Please contact patient's transplant care coordinator ADI Gaffney RN at [phone]/pager [phone] and/or endocrinologist prior to administration.       Depakote [Divalproex Sodium] Other (See Comments)     Chest pain     Zithromax [Azithromycin Dihydrate] Anaphylaxis     Noted in 4/7/08 ER     Darvocet [Propoxyphene N-Apap] Itching     Morphine Nausea and Vomiting and Rash     Nalbuphine Hcl Rash     RASH :nubaine     Zosyn [Piperacillin-Tazobactam In D5w] Rash     Possible allergy, did have a diffuse rash that seemed drug related but could have also been related to soap in the hospital.      Bactrim [Sulfamethoxazole W-Trimethoprim] Other (See Comments) and Nausea and Vomiting      Severely low liver function.     Cats      Compazine [Prochlorperazine] Other (See Comments)     Twitching. Takes Benedryl and is fine     Dust Mites      Grass      Prednisone Other (See Comments)     Insomnia       Ragweeds      Tape [Adhesive Tape] Blisters     Tramadol      Trees      Zofran [Ondansetron] Other (See Comments)     migraine     Flagyl [Metronidazole] Hives and Rash       PERTINENT MEDICATIONS:    Current Outpatient Medications:      acetaminophen 500 MG CAPS, Take 2 mg by mouth 2 times daily, Disp: , Rfl:      amitriptyline (ELAVIL) 10 MG tablet, Take 5 tablets (50 mg) by mouth At Bedtime Take 40 mg at bedtime, Disp: 150 tablet, Rfl: 11     amylase-lipase-protease (CREON 24) 21381-61873 units CPEP per EC capsule, Take 3-4 capsules by mouth with meals and 1-2 with snacks. Maximum 15 capsules per day., Disp: 450 capsule, Rfl: 11     aspirin 81 MG EC tablet, Take 1 tablet (81 mg) by mouth daily, Disp: 90 tablet, Rfl: 3     azelastine (ASTELIN) 0.1 % nasal spray, Spray 1 spray into both nostrils 2 times daily, Disp: 1 Bottle, Rfl: 11     blood glucose (PAT CONTOUR NEXT) test strip, Use to test blood sugar 6 times daily or as directed., Disp: 2 Box, Rfl: 11     blood glucose monitoring (PAT MICROLET) lancets, Use to test blood sugar 6-8 times daily or as directed., Disp: 100 each, Rfl: 11     calcium carbonate-vitamin D (CALCIUM 500/D) 500-200 MG-UNIT tablet, , Disp: , Rfl:      cetirizine (ZYRTEC) 10 MG tablet, Take 1 tablet (10 mg) by mouth daily, Disp: 90 tablet, Rfl: 3     diphenhydrAMINE (BENADRYL ALLERGY) 25 MG capsule, Take 1 capsule (25 mg) by mouth every 6 hours as needed for itching or allergies, Disp: 56 capsule, Rfl: 0     docusate sodium (COLACE) 100 MG capsule, Take 1 capsule (100 mg) by mouth 2 times daily as needed for constipation,, Disp: 60 capsule, Rfl: 0     estradiol (VAGIFEM) 10 MCG TABS vaginal tablet, Place 1 tablet (10 mcg) vaginally twice a week, Disp: 24 tablet, Rfl:  3     famotidine (PEPCID) 20 MG tablet, Take 1 tablet (20 mg) by mouth 2 times daily, Disp: 90 tablet, Rfl: 3     fish oil-omega-3 fatty acids 1000 MG capsule, , Disp: , Rfl:      FOLIC ACID PO, Take 1 mg by mouth daily, Disp: , Rfl:      glucose 40 % GEL gel, Take 15-30 g by mouth every 15 minutes as needed for low blood sugar, Disp: 3 Tube, Rfl: 2     ibuprofen (ADVIL/MOTRIN) 400 MG tablet, Take 1 tablet (400 mg) by mouth every 6 hours as needed for moderate pain, Disp: 30 tablet, Rfl: 0     levothyroxine (SYNTHROID) 137 MCG tablet, Take 1 tablet (137 mcg) by mouth daily, Disp: 90 tablet, Rfl: 3     lubiprostone (AMITIZA) 8 MCG capsule, Take 3 capsules in AM and 2 capsules in PM  More refills after virtual visit with Ms Marti, Disp: 150 capsule, Rfl: 11     magnesium oxide 200 MG TABS, 1,000 mg , Disp: , Rfl:      montelukast (SINGULAIR) 10 MG tablet, TAKE 1 TABLET(10 MG) BY MOUTH AT BEDTIME, Disp: 90 tablet, Rfl: 3     multivitamin CF formula (CHOICEFUL) capsule, Take 1 tablet by mouth daily, Disp: , Rfl: 11     Nutritional Supplements (BOOST HIGH PROTEIN) LIQD, After above baseline labs are drawn, give: 6 mL/kg to maximum of 360 mL; the beverage is to be consumed within 5 minutes., Disp: , Rfl: 0     omeprazole (PRILOSEC) 40 MG DR capsule, Take 1 capsule (40 mg) by mouth 2 times daily, Disp: 180 capsule, Rfl: 3     order for DME, Equipment being ordered:Cefaly, Disp: 1 Device, Rfl: 0     prochlorperazine (COMPAZINE) 5 MG tablet, Take two 5 mg tablets by mouth every six hours as needed for nausea., Disp: 90 tablet, Rfl: 3     promethazine (PHENERGAN) 25 MG tablet, Take 1 tablet (25 mg) by mouth At Bedtime, Disp: 90 tablet, Rfl: 3     senna-docusate (SENOKOT-S;PERICOLACE) 8.6-50 MG per tablet, Take 1-2 tablets by mouth 2 times daily, Disp: 100 tablet, Rfl: 0     Sharps Container (BD SHARPS ) MISC, 1 Container as needed, Disp: 1 each, Rfl: 1     ZOLMitriptan (ZOMIG) 5 MG tablet, Take 1 tablet (5 mg) by  mouth at onset of headache for migraine, Disp: 9 tablet, Rfl: 3    SOCIAL HISTORY:  Social History     Socioeconomic History     Marital status:      Spouse name: Norris     Number of children: 3     Years of education: 16     Highest education level: Not on file   Occupational History     Occupation: director     Employer: [employer]   Social Needs     Financial resource strain: Not on file     Food insecurity     Worry: Not on file     Inability: Not on file     Transportation needs     Medical: Not on file     Non-medical: Not on file   Tobacco Use     Smoking status: Former Smoker     Packs/day: 0.50     Years: 6.00     Pack years: 3.00     Types: Cigarettes     Start date: 1985     Quit date: 1992     Years since quittin.0     Smokeless tobacco: Never Used     Tobacco comment: no 2nd hand   Substance and Sexual Activity     Alcohol use: Yes     Alcohol/week: 3.0 - 6.0 standard drinks     Types: 1 - 2 Glasses of wine, 1 - 2 Cans of beer, 1 - 2 Shots of liquor per week     Comment: occasional     Drug use: No     Sexual activity: Yes     Partners: Male     Birth control/protection: None     Comment: Hystectomy 1999   Lifestyle     Physical activity     Days per week: Not on file     Minutes per session: Not on file     Stress: Not on file   Relationships     Social connections     Talks on phone: Not on file     Gets together: Not on file     Attends Evangelical service: Not on file     Active member of club or organization: Not on file     Attends meetings of clubs or organizations: Not on file     Relationship status: Not on file     Intimate partner violence     Fear of current or ex partner: Not on file     Emotionally abused: Not on file     Physically abused: Not on file     Forced sexual activity: Not on file   Other Topics Concern     Parent/sibling w/ CABG, MI or angioplasty before 65F 55M? No      Service Not Asked     Blood Transfusions No     Caffeine Concern Not Asked      Occupational Exposure Not Asked     Hobby Hazards Not Asked     Sleep Concern Not Asked     Stress Concern Not Asked     Weight Concern Not Asked     Special Diet Not Asked     Back Care Not Asked     Exercise Not Asked     Bike Helmet Not Asked     Seat Belt Yes     Self-Exams Not Asked   Social History Narrative     with 3 children and a dog.  No smoking, etoh or drug use.  Worked as a  for [employer] in [place] in the past.  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       FAMILY HISTORY:  Family History   Problem Relation Age of Onset     Eye Disorder Father         cataract, detached retina     Myocardial Infarction Father 60     Lipids Father      Cerebrovascular Disease Father      Depression Father      Substance Abuse Father      Anesthesia Reaction Father         stroke right after surgery     Cataracts Father      Osteoarthritis Father      Ulcerative Colitis Father      Lipids Mother      Hypertension Mother      Thyroid Disease Mother      Depression Mother      Angina Mother      GERD Mother      Skin Cancer Mother      Eye Disorder Son         ptosis     Eye Disorder Paternal Grandmother         cataract     Eye Disorder Paternal Grandfather         cataract     Diabetes Paternal Grandfather      Eye Disorder Maternal Grandmother         cataract     Thyroid Disease Maternal Grandmother      Coronary Artery Disease Sister         angioplasty     GERD Sister      Substance Abuse Sister      Depression Sister      Depression Son      Anxiety Disorder Son      Thyroid Disease Sister      Diabetes Maternal Grandfather      Depression Nephew      Anxiety Disorder Nephew      Thyroid Disease Nephew      Diabetes Type 2  Cousin         paternal cousin     Heart Disease Paternal Aunt      Diabetes Paternal Aunt      Diabetes Paternal Uncle      Heart Disease Paternal Uncle      Past/family/social history reviewed and no changes    PHYSICAL  EXAMINATION:  General appearance: Healthy appearing adult, in no acute distress  Eyes: Sclera anicteric, Pupils round and reactive to light  Ears, nose, mouth and throat: No obvious external lesions of ears and nose, Hearing intact  Neck: Symmetric, No obvious external lesions  Respiratory: Normal respiration, no use of accessory muscles   Skin: No rashes or jaundice   Psychiatric: Oriented to person, place and time, Appropriate mood and affect.       PERTINENT STUDIES:  Orders Only on 09/13/2018   Component Date Value Ref Range Status     WBC 09/13/2018 5.8  4.0 - 11.0 10e9/L Final     RBC Count 09/13/2018 4.51  3.8 - 5.2 10e12/L Final     Hemoglobin 09/13/2018 14.1  11.7 - 15.7 g/dL Final     Hematocrit 09/13/2018 42.1  35.0 - 47.0 % Final     MCV 09/13/2018 93  78 - 100 fl Final     MCH 09/13/2018 31.3  26.5 - 33.0 pg Final     MCHC 09/13/2018 33.5  31.5 - 36.5 g/dL Final     RDW 09/13/2018 13.2  10.0 - 15.0 % Final     Platelet Count 09/13/2018 430  150 - 450 10e9/L Final     Diff Method 09/13/2018 Automated Method   Final     % Neutrophils 09/13/2018 37.1  % Final     % Lymphocytes 09/13/2018 40.5  % Final     % Monocytes 09/13/2018 10.5  % Final     % Eosinophils 09/13/2018 9.8  % Final     % Basophils 09/13/2018 2.1  % Final     % Immature Granulocytes 09/13/2018 0.0  % Final     Nucleated RBCs 09/13/2018 0  0 /100 Final     Absolute Neutrophil 09/13/2018 2.2  1.6 - 8.3 10e9/L Final     Absolute Lymphocytes 09/13/2018 2.4  0.8 - 5.3 10e9/L Final     Absolute Monocytes 09/13/2018 0.6  0.0 - 1.3 10e9/L Final     Absolute Eosinophils 09/13/2018 0.6  0.0 - 0.7 10e9/L Final     Absolute Basophils 09/13/2018 0.1  0.0 - 0.2 10e9/L Final     Abs Immature Granulocytes 09/13/2018 0.0  0 - 0.4 10e9/L Final     Absolute Nucleated RBC 09/13/2018 0.0   Final     Sodium 09/13/2018 138  133 - 144 mmol/L Final     Potassium 09/13/2018 4.3  3.4 - 5.3 mmol/L Final     Chloride 09/13/2018 102  94 - 109 mmol/L Final     Carbon  "Dioxide 09/13/2018 30  20 - 32 mmol/L Final     Anion Gap 09/13/2018 5  3 - 14 mmol/L Final     Glucose 09/13/2018 119* 70 - 99 mg/dL Final     Urea Nitrogen 09/13/2018 12  7 - 30 mg/dL Final     Creatinine 09/13/2018 0.83  0.52 - 1.04 mg/dL Final     GFR Estimate 09/13/2018 72  >60 mL/min/1.7m2 Final     GFR Estimate If Black 09/13/2018 87  >60 mL/min/1.7m2 Final     Calcium 09/13/2018 9.2  8.5 - 10.1 mg/dL Final     Bilirubin Direct 09/13/2018 0.2  0.0 - 0.2 mg/dL Final     Bilirubin Total 09/13/2018 0.8  0.2 - 1.3 mg/dL Final     Albumin 09/13/2018 3.9  3.4 - 5.0 g/dL Final     Protein Total 09/13/2018 7.8  6.8 - 8.8 g/dL Final     Alkaline Phosphatase 09/13/2018 145  40 - 150 U/L Final     ALT 09/13/2018 59* 0 - 50 U/L Final     AST 09/13/2018 39  0 - 45 U/L Final     IGG 09/13/2018 1160  695 - 1620 mg/dL Final     IgG1 09/13/2018 416  300 - 856 mg/dL Final     IgG2 09/13/2018 384  158 - 761 mg/dL Final     IgG3 09/13/2018 83  24 - 192 mg/dL Final     IgG4 09/13/2018 95* 11 - 86 mg/dL Final               Dinora Mcghee is a 55 year old female who is being evaluated via a billable video visit.      The patient has been notified of following:     \"This video visit will be conducted via a call between you and your physician/provider. We have found that certain health care needs can be provided without the need for an in-person physical exam.  This service lets us provide the care you need with a video conversation.  If a prescription is necessary we can send it directly to your pharmacy.  If lab work is needed we can place an order for that and you can then stop by our lab to have the test done at a later time.    If during the course of the call the physician/provider feels a video visit is not appropriate, you will not be charged for this service.\"     Patient confirmed that they are in [place] for today's visit yes.    Video-Visit Details  Type of service:  Video Visit    Video Start Time: 11:03 AM  Video " End Time:  11:34 AM    Originating Location (pt. Location): Home    Distant Location (provider location):  [facility] [place] (provider's home)     Platform used: Emeli Jaquez CMA            Again, thank you for allowing me to participate in the care of your patient.        Sincerely,        Rosanne Marti PA-C

## 2021-02-11 DIAGNOSIS — E10.9 TYPE 1 DIABETES MELLITUS WITHOUT COMPLICATION (H): Primary | ICD-10-CM

## 2021-02-15 DIAGNOSIS — E10.9 TYPE 1 DIABETES MELLITUS WITHOUT COMPLICATION (H): ICD-10-CM

## 2021-02-18 ENCOUNTER — TELEPHONE (OUTPATIENT)
Dept: ENDOCRINOLOGY | Facility: CLINIC | Age: 56
End: 2021-02-18

## 2021-02-18 NOTE — TELEPHONE ENCOUNTER
Hello team!    I am processing the order for the Freestyle Yandy 2 sensors and this patient's insurance has a medical policy and I need to make sure they meet the criteria before we can dispense the sensors. I looked in [facility] but I did not see a recent visit from MD Melissa in regards to the patient's diabetes. Is there a specific visit in Epic that you would like me to review for insurance compliance? The chart notes would have to state if pt is type 1 or type 2, how often they are testing blood glucose and how often they are injecting insulin. Please advise so I can follow up to review the chart notes and follow up with the patient. Thank you!

## 2021-02-19 NOTE — TELEPHONE ENCOUNTER
Sorry, you can ignore the order.  She wanted me to route it to [facility] and I inadvertently had specialty pharmacy on there. She has an upcoming visit scheduled.

## 2021-03-23 ENCOUNTER — VIRTUAL VISIT (OUTPATIENT)
Dept: INTERNAL MEDICINE | Facility: CLINIC | Age: 56
End: 2021-03-23
Payer: COMMERCIAL

## 2021-03-23 DIAGNOSIS — M25.552 HIP PAIN, LEFT: Primary | ICD-10-CM

## 2021-03-23 PROCEDURE — 99214 OFFICE O/P EST MOD 30 MIN: CPT | Mod: 95 | Performed by: NURSE PRACTITIONER

## 2021-03-23 NOTE — PROGRESS NOTES
Dinora is a 55 year old who is being evaluated via a billable video visit.      How would you like to obtain your AVS? MyChart  If the video visit is dropped, the invitation should be resent by: Send to e-mail at: [email or web address]  Will anyone else be joining your video visit? No      Video Start Time: 3 pm       Subjective   Dinora is a 55 year old who presents for the following health issues : left leg pain      HPI:  Dinora concern today is her left hip and knee pain.  She has pain > in the left leg than the right which makes walking very uncomfortable.  She has pain lying on her hip in bed and even when turning over in bed. She walked 3 miles the other day and she experienced calf pain and thigh pain. She describes the pain as located right over her femur head. Her understanding regarding steroids is that she should avoid them at all costs due to her islet transplant.     She also has been dealing with gastroparesis since November and is a gastroparesis diet which is not conducive to diabetes.  She has been wearing a Gege 2 glucose monitor and her glucose numbers vary considerably.     Review of Systems   Constitutional, HEENT, cardiovascular, pulmonary, gi and gu systems are negative, except as otherwise noted.      Objective       Vitals:  No vitals were obtained today due to virtual visit.    Physical Exam   GENERAL: Healthy, alert and no distress  EYES: Eyes grossly normal to inspection.  No discharge or erythema, or obvious scleral/conjunctival abnormalities.  RESP: No audible wheeze, cough, or visible cyanosis.  No visible retractions or increased work of breathing.    SKIN: Visible skin clear. No significant rash, abnormal pigmentation or lesions.  NEURO:   Mentation and speech appropriate for age.  PSYCH: Mentation appears normal, affect normal/bright, judgement and insight intact, normal speech and appearance well-groomed.        Assessment  Dinora was seen today for pain.    Diagnoses and all  orders for this visit:    Right hip pain  Her hip pain sounds consistent with left hip bursitis.  I will reach out to Dr. Melissa regarding steroid injection.  Dinora was seen today for pain.    Diagnoses and all orders for this visit:    Hip pain, left  -     PHYSICAL THERAPY REFERRAL; Future  She would like to see a local PT in [facility] [place] and see a therapist by the name of Mitali Jorgensen.      Video-Visit Details    Type of service:  Video Visit    Video End Time:3:02    Originating Location (pt. Location): [place]    Distant Location (provider location):  [facility] [place]     Platform used for Video Visit: iContact    Total time spent today with this patient including chart review, exam time with patient and documentation : 27 minutes  Latasha INGRAM, CNP

## 2021-03-23 NOTE — NURSING NOTE
Chief Complaint   Patient presents with     Pain     pt would like to discuss hip and knee pain for one month       Roberto Guerra CMA, EMT at 2:53 PM on 3/23/2021.

## 2021-03-24 ENCOUNTER — MYC MEDICAL ADVICE (OUTPATIENT)
Dept: INTERNAL MEDICINE | Facility: CLINIC | Age: 56
End: 2021-03-24

## 2021-03-26 ENCOUNTER — VIRTUAL VISIT (OUTPATIENT)
Dept: GASTROENTEROLOGY | Facility: CLINIC | Age: 56
End: 2021-03-26
Payer: COMMERCIAL

## 2021-03-26 VITALS — HEIGHT: 68 IN | WEIGHT: 148 LBS | BODY MASS INDEX: 22.43 KG/M2

## 2021-03-26 DIAGNOSIS — K59.04 CHRONIC IDIOPATHIC CONSTIPATION: Primary | ICD-10-CM

## 2021-03-26 DIAGNOSIS — K31.84 GASTROPARESIS: ICD-10-CM

## 2021-03-26 PROCEDURE — 99215 OFFICE O/P EST HI 40 MIN: CPT | Mod: 95 | Performed by: PHYSICIAN ASSISTANT

## 2021-03-26 RX ORDER — PRUCALOPRIDE 2 MG/1
2 TABLET, FILM COATED ORAL DAILY
Qty: 30 TABLET | Refills: 11 | Status: SHIPPED | OUTPATIENT
Start: 2021-03-26 | End: 2021-05-21

## 2021-03-26 ASSESSMENT — MIFFLIN-ST. JEOR: SCORE: 1319.82

## 2021-03-26 ASSESSMENT — PAIN SCALES - GENERAL: PAINLEVEL: NO PAIN (0)

## 2021-03-26 NOTE — PROGRESS NOTES
Dinora is a 55 year old who is being evaluated via a billable video visit.      How would you like to obtain your AVS? MyChart  If the video visit is dropped, the invitation should be resent by: Text to cell phone: [phone]  Will anyone else be joining your video visit? No      Video Start Time: 2:04 PM  Video-Visit Details    Type of service:  Video Visit    Video End Time: 2:40 PM    Originating Location (pt. 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     1.  Abdominal pain, nausea/vomiting  Patient reports worsening abdominal pain, nausea and vomiting starting in November 2020.  Of note, had been working on a more vegan diet around the time.  Was evaluated in the emergency department with unremarkable CT scan and lab work-up.  Recently had small bowel follow-through without evidence of stricture or obstruction.  Patient reports that her symptoms significantly improved with the gastroparesis diet as discussed with our GI dietitian.  She does find that this diet is very limiting and has made her blood sugar difficult to control.  Stool pattern continues to be variable with Amitiza and she will have days where she has a significant amount of stool output.  I am concerned that this represents continued underlying constipation.  Given continued symptoms, I would recommend that  the patient start Motegrity at this time as this may help with motility of her colon as well as stomach.  We discussed risks and benefits of this medication in depth.  Would recommend that once approved by her insurance, she stop Amitiza and magnesium oxide and take Motegrity alone.  Pending response to this medication, may need to add on as needed osmotic laxatives.   --Continue Amitiza and max dose magnesium oxide  --Once approved by your insurance, start Motegrity and stop Amitiza and magnesium oxide  --Continue good water intake, exercise  --Follow dietary recommendations as you have discussed with Quyen.  You are doing a great job with this!  It is okay to go to a more liquid diet if symptoms worsen.  Continue to monitor your weight  --Let us know if symptoms change or worsen    RTC 3 months     40 Minutes was spent on the date of the encounter during chart review, history and exam, documentation, and further activities as noted     Note completed using voice recognition software. Some word and grammatical errors may occur.    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     Summarized from previous documentation with Dr. Genao and I, please see previous notes for further details     Gastroparesis  Gastric emptying study with 2-hour study at [facility] have been consistent with gastroparesis, patient had follow-up study here 6/27/2016 with 49% remaining at 4 hours however the patient was not aware she was on narcotics at the time.   Patient had previous GJ tubes in fall 2016, NG tube used for venting with tube feeds and was able to wean off of tube feeds after TPA T.  At initial visit she reported severe migraine headaches associated with vomiting and is on amitriptyline for migraine prophylaxis.  Patient has used several medication for the symptoms including scopolamine patch, pro-chloro para Rufino, promethazine which helped symptoms.  She has also been seen by neurology and psychology due to concern that she has a difficult time sleeping and with migraines.  At her last visit, patient had been working with dietition to increase caloric intake and was maintaining weight around 150 lbs. She reported occasional nausea without vomiting, with 1 episode in the last month. Symptoms may have been worsened due to anxiety regarding surgery and ativan had helped.  At the patient's last visit:  the patient reports that since taking the Phenergan at night, her nausea and vomiting has improved significantly and she is not waking up as often.  She does report an episode around the holidays in which he vomited 5-6 times however thinks this may be due to a GI bug.  Now she reports that she has nausea about 2 times a week depending on the week.  Is also taking Compazine as needed and no longer taking Zofran.  She also understands that blood sugar may play a role in her nausea therefore she will check her blood sugar when nauseous.    Last year: The patient notes improvement in her symptoms if she waits to eat breakfast until 10-11 in the morning. She continues to take compazine in the morning, and phenergan at night. Still will experience vomiting, especially if she does not take phenergan at night. Nausea/vomiting worsens with migraines which have improved while avoiding gluten and increasing fruits and vegetables (2 days a month instead of 4 times a week). Also has cut back on meat and aims to eat more chicken and fish.     Constipation/irregular bowel  "movements/bloating  Patient reports history of ongoing constipation for over 20 years after having her first child.  She has tried multiple interventions including a bowel cleanout, MiraLAX, senna, milk of magnesium, Linzess, and Amitiza for which she was on when she first came to [facility] and  GI clinic.  Patient also takes creon.  Patient has done a course of rifaximin in the past with with improvement in bloating and stool consistency.  Patient has also been on specific SIBO/FODMAP diets to the [facility] while avoiding certain food triggers. At the patient's last visit: the patient notes that she continues to have occasional watery stools which she describes occurs on \"clearing days \".  This will happen every couple of days in which she describes urgent bowel movements with a large amount of stool requiring multiple trips to the bathroom.  When this occurs, she feels as though it has drained her energy.  Will also have abdominal discomfort associated with this which is slightly alleviated with a heating pad.  On other days, she will have a more solid bowel movement which she describes as a coil.  She plans to reschedule around having a bowel movement and her daily routine that involves exercise, drinking hot water, and guided imagery in order to have 1-2 satisfying bowel movements.  Continues to follow low FODMAP diet.  She continues to take amities of 5 tablets a day, occasional senna, and daily Colace. On average, the patient will take 12-15 creon tablets a day.     Last year she reports she typically has 2 bowel movements a day and denies significant constipation. Has cut out Senna, and is taking colace, Amitiza (5 tablets a day), magnesium oxide 200 mg. Is also taking Creon 12-15 tablets daily. Will have 1 episode of diarrhea a week which is stable from before- when this happens she will have very loose stools and urgency. Also notes increased bloating when consuming gluten.     GERD, Dysphagia "   Summarized/taken from prior documentation   Patient experiences GERD as substernal and throat burning with nocturnal relaxant causing choking. She had previously followed with Dr. George Flores in out clinic and reports a prior Schatzki's ring requiring previous dilation. Manometry was noral, and pH impedence had a DeMeester of 24 with positive symptoms association. Patient has treated GERD symptoms with BID PPI, Carafate, H2 blocker, and tums. At her last visit, she had been experiencing heartburn and has restarted omeprazole 40 mg daily with continued symptoms with specific food triggers. At the patient's last visit, she was instructed to start zantac 300 mg a day.     Last year she notes that since adding zantac, GERD has been well controlled. Is also taking daily omeprazole. Sometimes will note a deep/hoarse voice, this happens in the morning.     Interval History 12/18/2020:  Patient reports that she had been feeling pretty well up until early November She had been working on being a vegetarian.  Early November the patient noted worsening symptoms.  He was seen at the [facility] emergency department on November 18.  At that time, blood work notable for normal BMP, low lipase, unremarkable hepatic panel.  CBC without elevation of WBCs though she did have elevated platelets, neutrophils and lymphocytes.  CT at that time with moderate stool burden, nonspecific fluid in small intestine.  No evidence of obstruction per documentation. Had similar episodes after this.     Today she reports that she continues to have pain on her right side radiating to her back.  This has improved with juicing foods.  States that she has 6-7 small meals a day with about a cup of food at each meal.  States that foods such as toast, bread, gluten seem to go better than healthier foods.  Is having a difficult time balancing foods that she enjoys eating, foods that cause symptoms and foods that are healthy.  Will have nausea  vomiting and pain with food triggers.  Is using as needed Phenergan. she also reports some nausea when bending down, and discomfort when laying on her belly.  Does report she has had increased eructation, feels like she can feel and watch food move throughout her abdomen.  Does not have foul-smelling gas that she has had in the past with SIBO.    Is currently taking max dose magnesium oxide in addition to Amitiza.  With this she is having regular bowel movements that are urgent.  Today she had 4 bowel movements.  Said they are formed, long, no melena or hematochezia.  Continues to take Creon.  Has reduced the dose to about 1 per small meal.    Patient reports he has had increased stress recently with the help of her son.  He does exercise regularly, meditates, and is established with a therapist.    Interval History 1/15/2021:  Has been tracking symptoms with an beatris. When the urge comes to have a bowel movement, she will need to go very quickly. No recent incontinence. With diarrhea she feels totally empty. Can have sharp stabbing pain before bowel movements.     She has been tracking stool consistency. This will happen 2-3 weeks.   3% of time sausage with cracks  17% nothing  30% mushy consistency   31% soft stool  11% diarrhea     About 10 days ago stopped taking anti-nausea medications. The small meals is really helping. Also decreased dose omeprazole (40 mg) and famotidine once a day in the morning. No vomiting since [place].    A piece a toast w/ peanut butter is going ok.     + weight down to 150 lbs. Energy has increased, is able to bike at least 30 minutes up to 45 minutes.     Interval History 3/26/2021:   Has been working with PT on hip and hip pain. She finished PT about half an hour ago- this leads to nausea. She reports difficulty with delayed gastric emptying diet- energy is less, Has been reintroducing some foods- fish and shellfish have been going ok (shrimp, tuna). Symptoms are better than they  were in November. Juicing has been going ok- she has been doing smoothies with peanut butter powder. Cannot handle cruciferous vegetables.     Losing weight again, 4 lbs.     2 full days vomiting and diarrhea this week- she attributed this scallops. Had horrible gas that mad her vomit. Horrible pain and bloating with this. Had two days of vomiting with diarrhea. Yesterday had 9 bowel movements- yellow, greasy, floaty.     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  Document previously scanned on 1/12/17.  Validation form completed and scanned.  Code Status reflects choices in most recent ACP document..  Confirmed/documented designated decision maker(s).  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 Provider to review and confirm.       GERD (gastroesophageal reflux  disease) 12/01/2010     Priority: Medium     Lumbago 04/18/2005     Priority: Medium     History of L5-S1 degenerative disk disease.         Sensorineural hearing loss 01/10/2005     Priority: Medium     Problem list name updated by automated process. Provider to review       Vertiginous syndrome and labyrinthine disorder 01/10/2005     Priority: Medium     Problem list name updated by automated process. Provider to review  IMO Regulatory Load OCT 2020       Sprain and strain of other specified sites of hip and thigh 12/09/2002     Priority: Medium     Chondromalacia of patella 12/09/2002     Priority: Medium     Need for prophylactic immunotherapy      Priority: Medium     trees, grass, srw, dust mites, cat       Allergic rhinitis due to other allergen 12/21/2001     Priority: Medium     Hypothyroidism      Priority: Medium     Problem list name updated by automated process. Provider to review         PERTINENT PAST MEDICAL HISTORY:  Past Medical History:   Diagnosis Date     Allergic rhinitis, cause unspecified      Allergy to other foods     strawberries, apples, celeries, alice, watermelon     Arthritis     left knee     Choledocholithiasis     long after cholecystectomy     Chronic abdominal pain      Chronic constipation      Chronic nausea      Chronic pancreatitis (H)      Degeneration of lumbar or lumbosacral intervertebral disc      Esophageal reflux     w/ hiatal hernia     Gastroparesis      Hiatal hernia      History of pituitary adenoma     s/p resection     Hypothyroidism      Migraines      Mild hyperlipidemia      On tube feeding diet     presence of GJ tube     Pancreatic disease     PD stricture, 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 endometriosis growth     APPENDECTOMY       C  DELIVERY ONLY       C  DELIVERY ONLY      repeat c section with incidental cystotomy with repair     C EXCIS PITUITARY,TRANSNASAL/SEPTAL      pituitary tumor removed for diabetes insipidus     C TOTAL ABDOM HYSTERECTOMY      w/ bilateral salpingoophorectomy       SECTION       COLONOSCOPY       ENDOSCOPIC RETROGRADE CHOLANGIOPANCREATOGRAM N/A 2015    Procedure: ENDOSCOPIC RETROGRADE CHOLANGIOPANCREATOGRAM;  Surgeon: Mandeep Park MD;  Location: [facility]     ENDOSCOPIC RETROGRADE CHOLANGIOPANCREATOGRAM N/A 2016    Procedure: COMBINED ENDOSCOPIC RETROGRADE CHOLANGIOPANCREATOGRAPHY, PLACE TUBE/STENT;  Surgeon: Mandeep Park MD;  Location: [facility]     ENDOSCOPIC RETROGRADE CHOLANGIOPANCREATOGRAM N/A 3/17/2016    Procedure: COMBINED ENDOSCOPIC RETROGRADE CHOLANGIOPANCREATOGRAPHY, REMOVE FOREIGN BODY OR STENT/TUBE;  Surgeon: Mandeep Park MD;  Location: [facility]     ENDOSCOPIC RETROGRADE CHOLANGIOPANCREATOGRAM N/A 2016    Procedure: COMBINED ENDOSCOPIC RETROGRADE CHOLANGIOPANCREATOGRAPHY, PLACE TUBE/STENT;  Surgeon: Mandeep Park MD;  Location: [facility]     ENDOSCOPIC RETROGRADE CHOLANGIOPANCREATOGRAM N/A 2016    Procedure: COMBINED ENDOSCOPIC RETROGRADE CHOLANGIOPANCREATOGRAPHY, REMOVE FOREIGN BODY OR STENT/TUBE;  Surgeon: Mandeep Park MD;  Location: [facility]     ENDOSCOPIC ULTRASOUND UPPER GASTROINTESTINAL TRACT (GI) N/A 10/3/2016    Procedure: ENDOSCOPIC ULTRASOUND, ESOPHAGOSCOPY / UPPER GASTROINTESTINAL TRACT (GI);  Surgeon: Guru Jose Rodas MD;  Location: [facility]     ESOPHAGOSCOPY, GASTROSCOPY, DUODENOSCOPY (EGD), COMBINED N/A 2015    Procedure: COMBINED ESOPHAGOSCOPY, GASTROSCOPY, DUODENOSCOPY (EGD), REMOVE FOREIGN BODY;  Surgeon: Mandeep Park MD;  [facility]     ESOPHAGOSCOPY, GASTROSCOPY, DUODENOSCOPY (EGD), COMBINED N/A  10/25/2015    Procedure: COMBINED ESOPHAGOSCOPY, GASTROSCOPY, DUODENOSCOPY (EGD);  Surgeon: Sammy Amaro MD;  Location: [facility]     ESOPHAGOSCOPY, GASTROSCOPY, DUODENOSCOPY (EGD), COMBINED N/A 10/25/2015    Procedure: COMBINED ESOPHAGOSCOPY, GASTROSCOPY, DUODENOSCOPY (EGD), BIOPSY SINGLE OR MULTIPLE;  Surgeon: Sammy Amaro MD;  Location: [facility]     ESOPHAGOSCOPY, GASTROSCOPY, DUODENOSCOPY (EGD), DILATATION, COMBINED       EXCISE LESION TRUNK N/A 4/17/2017    Procedure: EXCISE LESION TRUNK;  Removal of Abdominal Foreign Body;  Surgeon: Nestor Phoenix MD;  [facility]     HC ESOPH/GAS REFLUX TEST W NASAL IMPED >1 HR N/A 11/19/2015    Procedure: ESOPHAGEAL IMPEDENCE FUNCTION TEST WITH 24 HOUR PH GREATER THAN 1 HOUR;  Surgeon: Thiago Apple MD;  Location: [facility]     HC UGI ENDOSCOPY DIAG W BIOPSY  9/17/08     HC UGI ENDOSCOPY DIAG W BIOPSY  9/27/12     HC UGI ENDOSCOPY W ESOPHAGEAL DILATION BALLOON <30MM  9/17/08     HC UGI ENDOSCOPY W EUS N/A 5/5/2015    Procedure: COMBINED ENDOSCOPIC ULTRASOUND, ESOPHAGOSCOPY, GASTROSCOPY, DUODENOSCOPY (EGD);  Surgeon: Wm Dueñas MD;  Location: [facility]     HC WRIST ARTHROSCOP,RELEASE XVERS LIG Bilateral 12/17/08     INJECT TRANSVERSUS ABDOMINIS PLANE (TAP) BLOCK BILATERAL Left 9/22/2016    Procedure: INJECT TRANSVERSUS ABDOMINIS PLANE (TAP) BLOCK BILATERAL;  Surgeon: Dickson Corrigan MD;  [facility]     laparoscopic pineda  1995     LAPAROSCOPIC HERNIORRHAPHY INCISIONAL N/A 8/23/2018    Procedure: LAPAROSCOPIC HERNIORRHAPHY INCISIONAL;  Laparoscopic Incisional Hernia Repair with Symbotex Mesh Implant;  Surgeon: Nestor Phoenix MD;  Location: [facility]     LAPAROSCOPIC PANCREATECTOMY, TRANSPLANT AUTO ISLET CELL N/A 12/28/2016    Procedure: LAPAROSCOPIC PANCREATECTOMY, TRANSPLANT AUTO ISLET CELL;  Surgeon: Nestor Phoenix MD;  Location: [facility]     transphenoidal pituitary resection  1990       ALLERGIES:  Allergies   Allergen Reactions     Apple  Anaphylaxis     Corticosteroids Other (See Comments)     All oral, IV and injectable steroids are contraindicated (unless in life threatening situations) in [facility] transplant recipients. They can cause irreversible loss of islet cell function. Please contact patient's transplant care coordinator ADI Gaffney RN at [phone]/pager [phone] and/or endocrinologist prior to administration.       Depakote [Divalproex Sodium] Other (See Comments)     Chest pain     Zithromax [Azithromycin Dihydrate] Anaphylaxis     Noted in 4/7/08 ER     Darvocet [Propoxyphene N-Apap] Itching     Morphine Nausea and Vomiting and Rash     Nalbuphine Hcl Rash     RASH :nubaine     Zosyn [Piperacillin-Tazobactam In D5w] Rash     Possible allergy, did have a diffuse rash that seemed drug related but could have also been related to soap in the hospital.      Bactrim [Sulfamethoxazole W-Trimethoprim] Other (See Comments) and Nausea and Vomiting     Severely low liver function.     Cats      Compazine [Prochlorperazine] Other (See Comments)     Twitching. Takes Benedryl and is fine     Dust Mites      Grass      Prednisone Other (See Comments)     Insomnia       Ragweeds      Tape [Adhesive Tape] Blisters     Tramadol      Trees      Zofran [Ondansetron] Other (See Comments)     migraine     Flagyl [Metronidazole] Hives and Rash       PERTINENT MEDICATIONS:    Current Outpatient Medications:      acetaminophen 500 MG CAPS, Take 2 mg by mouth 2 times daily, Disp: , Rfl:      amitriptyline (ELAVIL) 10 MG tablet, Take 5 tablets (50 mg) by mouth At Bedtime Take 40 mg at bedtime, Disp: 150 tablet, Rfl: 11     amylase-lipase-protease (CREON 24) 01790-05231 units CPEP per EC capsule, Take 3-4 capsules by mouth with meals and 1-2 with snacks. Maximum 15 capsules per day., Disp: 450 capsule, Rfl: 11     aspirin 81 MG EC tablet, Take 1 tablet (81 mg) by mouth daily, Disp: 90 tablet, Rfl: 3     azelastine (ASTELIN) 0.1 % nasal spray, Spray 1  spray into both nostrils 2 times daily, Disp: 1 Bottle, Rfl: 11     blood glucose (PAT CONTOUR NEXT) test strip, Use to test blood sugar 6 times daily or as directed., Disp: 2 Box, Rfl: 11     blood glucose monitoring (PAT MICROLET) lancets, Use to test blood sugar 6-8 times daily or as directed., Disp: 100 each, Rfl: 11     calcium carbonate-vitamin D (CALCIUM 500/D) 500-200 MG-UNIT tablet, , Disp: , Rfl:      cetirizine (ZYRTEC) 10 MG tablet, Take 1 tablet (10 mg) by mouth daily, Disp: 90 tablet, Rfl: 3     Continuous Blood Gluc Sensor (FREESTYLE LISA 2 SENSOR) MISC, 1 each every 14 days, Disp: 2 each, Rfl: 11     diphenhydrAMINE (BENADRYL ALLERGY) 25 MG capsule, Take 1 capsule (25 mg) by mouth every 6 hours as needed for itching or allergies, Disp: 56 capsule, Rfl: 0     docusate sodium (COLACE) 100 MG capsule, Take 1 capsule (100 mg) by mouth 2 times daily as needed for constipation,, Disp: 60 capsule, Rfl: 0     estradiol (VAGIFEM) 10 MCG TABS vaginal tablet, Place 1 tablet (10 mcg) vaginally twice a week, Disp: 24 tablet, Rfl: 3     famotidine (PEPCID) 20 MG tablet, Take 1 tablet (20 mg) by mouth 2 times daily, Disp: 90 tablet, Rfl: 3     fish oil-omega-3 fatty acids 1000 MG capsule, , Disp: , Rfl:      FOLIC ACID PO, Take 1 mg by mouth daily, Disp: , Rfl:      glucose 40 % GEL gel, Take 15-30 g by mouth every 15 minutes as needed for low blood sugar, Disp: 3 Tube, Rfl: 2     ibuprofen (ADVIL/MOTRIN) 400 MG tablet, Take 1 tablet (400 mg) by mouth every 6 hours as needed for moderate pain, Disp: 30 tablet, Rfl: 0     levothyroxine (SYNTHROID) 137 MCG tablet, Take 1 tablet (137 mcg) by mouth daily, Disp: 90 tablet, Rfl: 3     lubiprostone (AMITIZA) 8 MCG capsule, Take 3 capsules in AM and 2 capsules in PM  More refills after virtual visit with Ms Marti, Disp: 150 capsule, Rfl: 11     magnesium oxide 200 MG TABS, 1,000 mg , Disp: , Rfl:      montelukast (SINGULAIR) 10 MG tablet, TAKE 1 TABLET(10 MG) BY  MOUTH AT BEDTIME, Disp: 90 tablet, Rfl: 3     multivitamin CF formula (CHOICEFUL) capsule, Take 1 tablet by mouth daily, Disp: , Rfl: 11     Nutritional Supplements (BOOST HIGH PROTEIN) LIQD, After above baseline labs are drawn, give: 6 mL/kg to maximum of 360 mL; the beverage is to be consumed within 5 minutes., Disp: , Rfl: 0     omeprazole (PRILOSEC) 40 MG DR capsule, Take 1 capsule (40 mg) by mouth 2 times daily, Disp: 180 capsule, Rfl: 3     order for DME, Equipment being ordered:Cefaly, Disp: 1 Device, Rfl: 0     prochlorperazine (COMPAZINE) 5 MG tablet, Take two 5 mg tablets by mouth every six hours as needed for nausea., Disp: 90 tablet, Rfl: 3     promethazine (PHENERGAN) 25 MG tablet, Take 1 tablet (25 mg) by mouth At Bedtime, Disp: 90 tablet, Rfl: 3     senna-docusate (SENOKOT-S;PERICOLACE) 8.6-50 MG per tablet, Take 1-2 tablets by mouth 2 times daily, Disp: 100 tablet, Rfl: 0     Sharps Container (BD SHARPS ) MISC, 1 Container as needed, Disp: 1 each, Rfl: 1     ZOLMitriptan (ZOMIG) 5 MG tablet, Take 1 tablet (5 mg) by mouth at onset of headache for migraine, Disp: 9 tablet, Rfl: 3    SOCIAL HISTORY:  Social History     Socioeconomic History     Marital status:      Spouse name: Norris     Number of children: 3     Years of education: 16     Highest education level: Not on file   Occupational History     Occupation: director     Employer: [employer]   Social Needs     Financial resource strain: Not on file     Food insecurity     Worry: Not on file     Inability: Not on file     Transportation needs     Medical: Not on file     Non-medical: Not on file   Tobacco Use     Smoking status: Former Smoker     Packs/day: 0.50     Years: 6.00     Pack years: 3.00     Types: Cigarettes     Start date: 1985     Quit date: 1992     Years since quittin.2     Smokeless tobacco: Never Used     Tobacco comment: no 2nd hand   Substance and Sexual Activity     Alcohol use: Yes     Alcohol/week:  3.0 - 6.0 standard drinks     Types: 1 - 2 Glasses of wine, 1 - 2 Cans of beer, 1 - 2 Shots of liquor per week     Comment: occasional     Drug use: No     Sexual activity: Yes     Partners: Male     Birth control/protection: None     Comment: Hystectomy 1999   Lifestyle     Physical activity     Days per week: Not on file     Minutes per session: Not on file     Stress: Not on file   Relationships     Social connections     Talks on phone: Not on file     Gets together: Not on file     Attends Congregation service: Not on file     Active member of club or organization: Not on file     Attends meetings of clubs or organizations: Not on file     Relationship status: Not on file     Intimate partner violence     Fear of current or ex partner: Not on file     Emotionally abused: Not on file     Physically abused: Not on file     Forced sexual activity: Not on file   Other Topics Concern     Parent/sibling w/ CABG, MI or angioplasty before 65F 55M? No      Service Not Asked     Blood Transfusions No     Caffeine Concern Not Asked     Occupational Exposure Not Asked     Hobby Hazards Not Asked     Sleep Concern Not Asked     Stress Concern Not Asked     Weight Concern Not Asked     Special Diet Not Asked     Back Care Not Asked     Exercise Not Asked     Bike Helmet Not Asked     Seat Belt Yes     Self-Exams Not Asked   Social History Narrative     with 3 children and a dog.  No smoking, etoh or drug use.  Worked as a  for [employer] in [place] in the past.  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       FAMILY HISTORY:  Family History   Problem Relation Age of Onset     Eye Disorder Father         cataract, detached retina     Myocardial Infarction Father 60     Lipids Father      Cerebrovascular Disease Father      Depression Father      Substance Abuse Father      Anesthesia Reaction Father         stroke right after surgery     Cataracts Father       Osteoarthritis Father      Ulcerative Colitis Father      Lipids Mother      Hypertension Mother      Thyroid Disease Mother      Depression Mother      Angina Mother      GERD Mother      Skin Cancer Mother      Eye Disorder Son         ptosis     Eye Disorder Paternal Grandmother         cataract     Eye Disorder Paternal Grandfather         cataract     Diabetes Paternal Grandfather      Eye Disorder Maternal Grandmother         cataract     Thyroid Disease Maternal Grandmother      Coronary Artery Disease Sister         angioplasty     GERD Sister      Substance Abuse Sister      Depression Sister      Depression Son      Anxiety Disorder Son      Thyroid Disease Sister      Diabetes Maternal Grandfather      Depression Nephew      Anxiety Disorder Nephew      Thyroid Disease Nephew      Diabetes Type 2  Cousin         paternal cousin     Heart Disease Paternal Aunt      Diabetes Paternal Aunt      Diabetes Paternal Uncle      Heart Disease Paternal Uncle      Past/family/social history reviewed and no changes    PHYSICAL EXAMINATION:  General appearance: Healthy appearing adult, in no acute distress  Eyes: Sclera anicteric, Pupils round and reactive to light  Ears, nose, mouth and throat: No obvious external lesions of ears and nose, Hearing intact  Neck: Symmetric, No obvious external lesions  Respiratory: Normal respiration, no use of accessory muscles   Skin: No rashes or jaundice   Psychiatric: Oriented to person, place and time, Appropriate mood and affect.       PERTINENT STUDIES:  Orders Only on 09/13/2018   Component Date Value Ref Range Status     WBC 09/13/2018 5.8  4.0 - 11.0 10e9/L Final     RBC Count 09/13/2018 4.51  3.8 - 5.2 10e12/L Final     Hemoglobin 09/13/2018 14.1  11.7 - 15.7 g/dL Final     Hematocrit 09/13/2018 42.1  35.0 - 47.0 % Final     MCV 09/13/2018 93  78 - 100 fl Final     MCH 09/13/2018 31.3  26.5 - 33.0 pg Final     MCHC 09/13/2018 33.5  31.5 - 36.5 g/dL Final     RDW 09/13/2018  13.2  10.0 - 15.0 % Final     Platelet Count 09/13/2018 430  150 - 450 10e9/L Final     Diff Method 09/13/2018 Automated Method   Final     % Neutrophils 09/13/2018 37.1  % Final     % Lymphocytes 09/13/2018 40.5  % Final     % Monocytes 09/13/2018 10.5  % Final     % Eosinophils 09/13/2018 9.8  % Final     % Basophils 09/13/2018 2.1  % Final     % Immature Granulocytes 09/13/2018 0.0  % Final     Nucleated RBCs 09/13/2018 0  0 /100 Final     Absolute Neutrophil 09/13/2018 2.2  1.6 - 8.3 10e9/L Final     Absolute Lymphocytes 09/13/2018 2.4  0.8 - 5.3 10e9/L Final     Absolute Monocytes 09/13/2018 0.6  0.0 - 1.3 10e9/L Final     Absolute Eosinophils 09/13/2018 0.6  0.0 - 0.7 10e9/L Final     Absolute Basophils 09/13/2018 0.1  0.0 - 0.2 10e9/L Final     Abs Immature Granulocytes 09/13/2018 0.0  0 - 0.4 10e9/L Final     Absolute Nucleated RBC 09/13/2018 0.0   Final     Sodium 09/13/2018 138  133 - 144 mmol/L Final     Potassium 09/13/2018 4.3  3.4 - 5.3 mmol/L Final     Chloride 09/13/2018 102  94 - 109 mmol/L Final     Carbon Dioxide 09/13/2018 30  20 - 32 mmol/L Final     Anion Gap 09/13/2018 5  3 - 14 mmol/L Final     Glucose 09/13/2018 119* 70 - 99 mg/dL Final     Urea Nitrogen 09/13/2018 12  7 - 30 mg/dL Final     Creatinine 09/13/2018 0.83  0.52 - 1.04 mg/dL Final     GFR Estimate 09/13/2018 72  >60 mL/min/1.7m2 Final     GFR Estimate If Black 09/13/2018 87  >60 mL/min/1.7m2 Final     Calcium 09/13/2018 9.2  8.5 - 10.1 mg/dL Final     Bilirubin Direct 09/13/2018 0.2  0.0 - 0.2 mg/dL Final     Bilirubin Total 09/13/2018 0.8  0.2 - 1.3 mg/dL Final     Albumin 09/13/2018 3.9  3.4 - 5.0 g/dL Final     Protein Total 09/13/2018 7.8  6.8 - 8.8 g/dL Final     Alkaline Phosphatase 09/13/2018 145  40 - 150 U/L Final     ALT 09/13/2018 59* 0 - 50 U/L Final     AST 09/13/2018 39  0 - 45 U/L Final     IGG 09/13/2018 1160  695 - 1620 mg/dL Final     IgG1 09/13/2018 416  300 - 856 mg/dL Final     IgG2 09/13/2018 465 468 - 761  mg/dL Final     IgG3 09/13/2018 83  24 - 192 mg/dL Final     IgG4 09/13/2018 95* 11 - 86 mg/dL Final

## 2021-03-26 NOTE — NURSING NOTE
"Chief Complaint   Patient presents with     RECHECK     follow up       Vitals:    03/26/21 1343   Weight: 67.1 kg (148 lb)   Height: 1.727 m (5' 8\")       Body mass index is 22.5 kg/m .      Mansoor Dhillon LPN                        "

## 2021-03-26 NOTE — LETTER
3/26/2021         RE: Dinora Mcghee  [address] [facility] [address]        Dear Colleague,    Thank you for referring your patient, Dinora Mcghee, to the [facility]STRO[facility] [place]. Please see a copy of my visit note below.    Dinora is a 55 year old who is being evaluated via a billable video visit.      How would you like to obtain your AVS? MyChart  If the video visit is dropped, the invitation should be resent by: Text to cell phone: [phone]  Will anyone else be joining your video visit? No      Video Start Time: 2:04 PM  Video-Visit Details    Type of service:  Video Visit    Video End Time: 2:40 PM    Originating Location (pt. 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     1.  Abdominal pain, nausea/vomiting  Patient reports worsening abdominal pain, nausea and vomiting starting in November 2020.  Of note, had been working on a more vegan diet around the time.  Was evaluated in the emergency department with unremarkable CT scan and lab work-up.  Recently had small bowel follow-through without evidence of stricture or obstruction.  Patient reports that her symptoms significantly improved with the gastroparesis diet as discussed with our GI dietitian.  She does find that this diet is very limiting and has made her blood  sugar difficult to control.  Stool pattern continues to be variable with Amitiza and she will have days where she has a significant amount of stool output.  I am concerned that this represents continued underlying constipation.  Given continued symptoms, I would recommend that the patient start Motegrity at this time as this may help with motility of her colon as well as stomach.  We discussed risks and benefits of this medication in depth.  Would recommend that once approved by her insurance, she stop Amitiza and magnesium oxide and take Motegrity alone.  Pending response to this medication, may need to add on as needed osmotic laxatives.   --Continue Amitiza and max dose magnesium oxide  --Once approved by your insurance, start Motegrity and stop Amitiza and magnesium oxide  --Continue good water intake, exercise  --Follow dietary recommendations as you have discussed with Queyn.  You are doing a great job with this!  It is okay to go to a more liquid diet if symptoms worsen.  Continue to monitor your weight  --Let us know if symptoms change or worsen    RTC 3 months     40 Minutes was spent on the date of the encounter during chart review, history and exam, documentation, and further activities as noted     Note completed using voice recognition software. Some word and grammatical errors may occur.    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     Summarized from previous documentation with Dr. Genao and I, please see previous notes for  further details     Gastroparesis  Gastric emptying study with 2-hour study at [facility] have been consistent with gastroparesis, patient had follow-up study here 6/27/2016 with 49% remaining at 4 hours however the patient was not aware she was on narcotics at the time.  Patient had previous GJ tubes in fall 2016, NG tube used for venting with tube feeds and was able to wean off of tube feeds after TPA T.  At initial visit she reported severe migraine headaches associated with vomiting and is on amitriptyline for migraine prophylaxis.  Patient has used several medication for the symptoms including scopolamine patch, pro-chloro para Rufino, promethazine which helped symptoms.  She has also been seen by neurology and psychology due to concern that she has a difficult time sleeping and with migraines.  At her last visit, patient had been working with dietition to increase caloric intake and was maintaining weight around 150 lbs. She reported occasional nausea without vomiting, with 1 episode in the last month. Symptoms may have been worsened due to anxiety regarding surgery and ativan had helped.  At the patient's last visit:  the patient reports that since taking the Phenergan at night, her nausea and vomiting has improved significantly and she is not waking up as often.  She does report an episode around the holidays in which he vomited 5-6 times however thinks this may be due to a GI bug.  Now she reports that she has nausea about 2 times a week depending on the week.  Is also taking Compazine as needed and no longer taking Zofran.  She also understands that blood sugar may play a role in her nausea therefore she will check her blood sugar when nauseous.    Last year: The patient notes improvement in her symptoms if she waits to eat breakfast until 10-11 in the morning. She continues to take compazine in the morning, and phenergan at night. Still will experience vomiting, especially if she does not take phenergan at  "night. Nausea/vomiting worsens with migraines which have improved while avoiding gluten and increasing fruits and vegetables (2 days a month instead of 4 times a week). Also has cut back on meat and aims to eat more chicken and fish.     Constipation/irregular bowel movements/bloating  Patient reports history of ongoing constipation for over 20 years after having her first child.  She has tried multiple interventions including a bowel cleanout, MiraLAX, senna, milk of magnesium, Linzess, and Amitiza for which she was on when she first came to [facility] and  GI clinic.  Patient also takes creon.  Patient has done a course of rifaximin in the past with with improvement in bloating and stool consistency.  Patient has also been on specific SIBO/FODMAP diets to the [facility] while avoiding certain food triggers. At the patient's last visit: the patient notes that she continues to have occasional watery stools which she describes occurs on \"clearing days \".  This will happen every couple of days in which she describes urgent bowel movements with a large amount of stool requiring multiple trips to the bathroom.  When this occurs, she feels as though it has drained her energy.  Will also have abdominal discomfort associated with this which is slightly alleviated with a heating pad.  On other days, she will have a more solid bowel movement which she describes as a coil.  She plans to reschedule around having a bowel movement and her daily routine that involves exercise, drinking hot water, and guided imagery in order to have 1-2 satisfying bowel movements.  Continues to follow low FODMAP diet.  She continues to take amities of 5 tablets a day, occasional senna, and daily Colace. On average, the patient will take 12-15 creon tablets a day.     Last year she reports she typically has 2 bowel movements a day and denies significant constipation. Has cut out Senna, and is taking colace, Amitiza (5 tablets a day), magnesium oxide " 200 mg. Is also taking Creon 12-15 tablets daily. Will have 1 episode of diarrhea a week which is stable from before- when this happens she will have very loose stools and urgency. Also notes increased bloating when consuming gluten.     GERD, Dysphagia   Summarized/taken from prior documentation   Patient experiences GERD as substernal and throat burning with nocturnal relaxant causing choking. She had previously followed with Dr. George Flores in out clinic and reports a prior Schatzki's ring requiring previous dilation. Manometry was noral, and pH impedence had a DeMeester of 24 with positive symptoms association. Patient has treated GERD symptoms with BID PPI, Carafate, H2 blocker, and tums. At her last visit, she had been experiencing heartburn and has restarted omeprazole 40 mg daily with continued symptoms with specific food triggers. At the patient's last visit, she was instructed to start zantac 300 mg a day.     Last year she notes that since adding zantac, GERD has been well controlled. Is also taking daily omeprazole. Sometimes will note a deep/hoarse voice, this happens in the morning.     Interval History 12/18/2020:  Patient reports that she had been feeling pretty well up until early November She had been working on being a vegetarian.  Early November the patient noted worsening symptoms.  He was seen at the [facility] emergency department on November 18.  At that time, blood work notable for normal BMP, low lipase, unremarkable hepatic panel.  CBC without elevation of WBCs though she did have elevated platelets, neutrophils and lymphocytes.  CT at that time with moderate stool burden, nonspecific fluid in small intestine.  No evidence of obstruction per documentation. Had similar episodes after this.     Today she reports that she continues to have pain on her right side radiating to her back.  This has improved with juicing foods.  States that she has 6-7 small meals a day with about a cup  of food at each meal.  States that foods such as toast, bread, gluten seem to go better than healthier foods.  Is having a difficult time balancing foods that she enjoys eating, foods that cause symptoms and foods that are healthy.  Will have nausea vomiting and pain with food triggers.  Is using as needed Phenergan. she also reports some nausea when bending down, and discomfort when laying on her belly.  Does report she has had increased eructation, feels like she can feel and watch food move throughout her abdomen.  Does not have foul-smelling gas that she has had in the past with SIBO.    Is currently taking max dose magnesium oxide in addition to Amitiza.  With this she is having regular bowel movements that are urgent.  Today she had 4 bowel movements.  Said they are formed, long, no melena or hematochezia.  Continues to take Creon.  Has reduced the dose to about 1 per small meal.    Patient reports he has had increased stress recently with the help of her son.  He does exercise regularly, meditates, and is established with a therapist.    Interval History 1/15/2021:  Has been tracking symptoms with an beatris. When the urge comes to have a bowel movement, she will need to go very quickly. No recent incontinence. With diarrhea she feels totally empty. Can have sharp stabbing pain before bowel movements.     She has been tracking stool consistency. This will happen 2-3 weeks.   3% of time sausage with cracks  17% nothing  30% mushy consistency   31% soft stool  11% diarrhea     About 10 days ago stopped taking anti-nausea medications. The small meals is really helping. Also decreased dose omeprazole (40 mg) and famotidine once a day in the morning. No vomiting since Paulette.    A piece a toast w/ peanut butter is going ok.     + weight down to 150 lbs. Energy has increased, is able to bike at least 30 minutes up to 45 minutes.     Interval History 3/26/2021:   Has been working with PT on hip and hip pain. She  finished PT about half an hour ago- this leads to nausea. She reports difficulty with delayed gastric emptying diet- energy is less, Has been reintroducing some foods- fish and shellfish have been going ok (shrimp, tuna). Symptoms are better than they were in November. Juicing has been going ok- she has been doing smoothies with peanut butter powder. Cannot handle cruciferous vegetables.     Losing weight again, 4 lbs.     2 full days vomiting and diarrhea this week- she attributed this scallops. Had horrible gas that mad her vomit. Horrible pain and bloating with this. Had two days of vomiting with diarrhea. Yesterday had 9 bowel movements- yellow, greasy, floaty.     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  Document previously scanned on 1/12/17.  Validation form completed and scanned.  Code Status reflects choices in most recent ACP document..  Confirmed/documented designated decision maker(s).  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 Provider to review and confirm.       GERD (gastroesophageal reflux disease) 12/01/2010     Priority: Medium     Lumbago 04/18/2005     Priority: Medium     History of L5-S1 degenerative disk disease.         Sensorineural hearing loss 01/10/2005     Priority: Medium     Problem list name updated by automated process. Provider to review       Vertiginous syndrome and labyrinthine disorder 01/10/2005     Priority: Medium     Problem list name updated by automated process. Provider to review  IMO Regulatory Load OCT 2020       Sprain and strain of other specified sites of hip and thigh 12/09/2002     Priority: Medium     Chondromalacia of patella 12/09/2002     Priority: Medium     Need for prophylactic immunotherapy      Priority: Medium     trees, grass, srw, dust mites, cat       Allergic rhinitis due to other allergen 12/21/2001     Priority: Medium     Hypothyroidism      Priority: Medium     Problem list name updated by automated process. Provider to review         PERTINENT PAST MEDICAL HISTORY:  Past Medical History:   Diagnosis Date     Allergic rhinitis, cause unspecified      Allergy to other foods     strawberries, apples, celeries, alice, watermelon     Arthritis     left knee     Choledocholithiasis     long after cholecystectomy     Chronic abdominal pain      Chronic constipation      Chronic nausea      Chronic pancreatitis (H)      Degeneration of lumbar or lumbosacral intervertebral disc      Esophageal reflux     w/ hiatal hernia     Gastroparesis      Hiatal hernia      History of pituitary adenoma     s/p resection     Hypothyroidism      Migraines      Mild hyperlipidemia      On tube feeding diet     presence of GJ tube     Pancreatic disease     PD stricture, 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 endometriosis growth     APPENDECTOMY       C  DELIVERY ONLY       C  DELIVERY ONLY      repeat c section with incidental cystotomy with repair     C EXCIS PITUITARY,TRANSNASAL/SEPTAL      pituitary tumor removed for diabetes insipidus     C TOTAL ABDOM HYSTERECTOMY      w/ bilateral salpingoophorectomy       SECTION       COLONOSCOPY       ENDOSCOPIC RETROGRADE CHOLANGIOPANCREATOGRAM N/A 2015    Procedure: ENDOSCOPIC RETROGRADE CHOLANGIOPANCREATOGRAM;  Surgeon: Mandeep Park MD;  [facility]: [facility]     ENDOSCOPIC RETROGRADE CHOLANGIOPANCREATOGRAM N/A 2016    Procedure: COMBINED ENDOSCOPIC RETROGRADE CHOLANGIOPANCREATOGRAPHY, PLACE TUBE/STENT;  Surgeon: Mandeep Park MD;  [facility]     ENDOSCOPIC RETROGRADE CHOLANGIOPANCREATOGRAM N/A 3/17/2016    Procedure: COMBINED ENDOSCOPIC RETROGRADE CHOLANGIOPANCREATOGRAPHY, REMOVE FOREIGN BODY OR STENT/TUBE;  Surgeon: Mandeep Park MD;  [facility]     ENDOSCOPIC RETROGRADE CHOLANGIOPANCREATOGRAM N/A 2016    Procedure: COMBINED ENDOSCOPIC RETROGRADE CHOLANGIOPANCREATOGRAPHY, PLACE TUBE/STENT;  Surgeon: Mandeep Park MD;  [facility]     ENDOSCOPIC RETROGRADE CHOLANGIOPANCREATOGRAM N/A 2016    Procedure: COMBINED ENDOSCOPIC RETROGRADE CHOLANGIOPANCREATOGRAPHY, REMOVE FOREIGN BODY OR STENT/TUBE;  Surgeon: Mandeep Park MD;  [facility]     ENDOSCOPIC ULTRASOUND UPPER GASTROINTESTINAL TRACT (GI) N/A 10/3/2016    Procedure: ENDOSCOPIC ULTRASOUND, ESOPHAGOSCOPY / UPPER GASTROINTESTINAL TRACT (GI);  Surgeon: Guru Jose Rodas MD;  [facility]: [facility]     ESOPHAGOSCOPY, GASTROSCOPY,  DUODENOSCOPY (EGD), COMBINED N/A 6/24/2015    Procedure: COMBINED ESOPHAGOSCOPY, GASTROSCOPY, DUODENOSCOPY (EGD), REMOVE FOREIGN BODY;  Surgeon: Mandeep Park MD;  [facility]: [facility]     ESOPHAGOSCOPY, GASTROSCOPY, DUODENOSCOPY (EGD), COMBINED N/A 10/25/2015    Procedure: COMBINED ESOPHAGOSCOPY, GASTROSCOPY, DUODENOSCOPY (EGD);  Surgeon: Sammy Amaro MD;  [facility]     ESOPHAGOSCOPY, GASTROSCOPY, DUODENOSCOPY (EGD), COMBINED N/A 10/25/2015    Procedure: COMBINED ESOPHAGOSCOPY, GASTROSCOPY, DUODENOSCOPY (EGD), BIOPSY SINGLE OR MULTIPLE;  Surgeon: Sammy Amaro MD;  [facility]     ESOPHAGOSCOPY, GASTROSCOPY, DUODENOSCOPY (EGD), DILATATION, COMBINED       EXCISE LESION TRUNK N/A 4/17/2017    Procedure: EXCISE LESION TRUNK;  Removal of Abdominal Foreign Body;  Surgeon: Nestor Phoenix MD;  [facility]     HC ESOPH/GAS REFLUX TEST W NASAL IMPED >1 HR N/A 11/19/2015    Procedure: ESOPHAGEAL IMPEDENCE FUNCTION TEST WITH 24 HOUR PH GREATER THAN 1 HOUR;  Surgeon: Thiago Apple MD;  Location: [facility]     HC UGI ENDOSCOPY DIAG W BIOPSY  9/17/08     HC UGI ENDOSCOPY DIAG W BIOPSY  9/27/12     HC UGI ENDOSCOPY W ESOPHAGEAL DILATION BALLOON <30MM  9/17/08     HC UGI ENDOSCOPY W EUS N/A 5/5/2015    Procedure: COMBINED ENDOSCOPIC ULTRASOUND, ESOPHAGOSCOPY, GASTROSCOPY, DUODENOSCOPY (EGD);  Surgeon: Wm Dueñas MD;  Location: [facility]      WRIST ARTHROSCOP,RELEASE XVERS LIG Bilateral 12/17/08     INJECT TRANSVERSUS ABDOMINIS PLANE (TAP) BLOCK BILATERAL Left 9/22/2016    Procedure: INJECT TRANSVERSUS ABDOMINIS PLANE (TAP) BLOCK BILATERAL;  Surgeon: Dickson Corrigan MD;  [facility]     laparoscopic pineda  1995     LAPAROSCOPIC HERNIORRHAPHY INCISIONAL N/A 8/23/2018    Procedure: LAPAROSCOPIC HERNIORRHAPHY INCISIONAL;  Laparoscopic Incisional Hernia Repair with Symbotex Mesh Implant;  Surgeon: Nestor Phoenix MD;  [facility]     LAPAROSCOPIC PANCREATECTOMY, TRANSPLANT AUTO  ISLET CELL N/A 12/28/2016    Procedure: LAPAROSCOPIC PANCREATECTOMY, TRANSPLANT AUTO ISLET CELL;  Surgeon: Nestor Phoenix MD;  [facility]: [facility]     transphenoidal pituitary resection  1990       ALLERGIES:  Allergies   Allergen Reactions     Apple Anaphylaxis     Corticosteroids Other (See Comments)     All oral, IV and injectable steroids are contraindicated (unless in life threatening situations) in Islet Auto transplant recipients. They can cause irreversible loss of islet cell function. Please contact patient's transplant care coordinator ADI Gaffney RN at [phone]/pager [phone] and/or endocrinologist prior to administration.       Depakote [Divalproex Sodium] Other (See Comments)     Chest pain     Zithromax [Azithromycin Dihydrate] Anaphylaxis     Noted in 4/7/08 ER     Darvocet [Propoxyphene N-Apap] Itching     Morphine Nausea and Vomiting and Rash     Nalbuphine Hcl Rash     RASH :nubaine     Zosyn [Piperacillin-Tazobactam In D5w] Rash     Possible allergy, did have a diffuse rash that seemed drug related but could have also been related to soap in the hospital.      Bactrim [Sulfamethoxazole W-Trimethoprim] Other (See Comments) and Nausea and Vomiting     Severely low liver function.     Cats      Compazine [Prochlorperazine] Other (See Comments)     Twitching. Takes Benedryl and is fine     Dust Mites      Grass      Prednisone Other (See Comments)     Insomnia       Ragweeds      Tape [Adhesive Tape] Blisters     Tramadol      Trees      Zofran [Ondansetron] Other (See Comments)     migraine     Flagyl [Metronidazole] Hives and Rash       PERTINENT MEDICATIONS:    Current Outpatient Medications:      acetaminophen 500 MG CAPS, Take 2 mg by mouth 2 times daily, Disp: , Rfl:      amitriptyline (ELAVIL) 10 MG tablet, Take 5 tablets (50 mg) by mouth At Bedtime Take 40 mg at bedtime, Disp: 150 tablet, Rfl: 11     amylase-lipase-protease (CREON 24) 50739-92059 units CPEP per EC capsule, Take 3-4  capsules by mouth with meals and 1-2 with snacks. Maximum 15 capsules per day., Disp: 450 capsule, Rfl: 11     aspirin 81 MG EC tablet, Take 1 tablet (81 mg) by mouth daily, Disp: 90 tablet, Rfl: 3     azelastine (ASTELIN) 0.1 % nasal spray, Spray 1 spray into both nostrils 2 times daily, Disp: 1 Bottle, Rfl: 11     blood glucose (PAT CONTOUR NEXT) test strip, Use to test blood sugar 6 times daily or as directed., Disp: 2 Box, Rfl: 11     blood glucose monitoring (PAT MICROLET) lancets, Use to test blood sugar 6-8 times daily or as directed., Disp: 100 each, Rfl: 11     calcium carbonate-vitamin D (CALCIUM 500/D) 500-200 MG-UNIT tablet, , Disp: , Rfl:      cetirizine (ZYRTEC) 10 MG tablet, Take 1 tablet (10 mg) by mouth daily, Disp: 90 tablet, Rfl: 3     Continuous Blood Gluc Sensor (FREESTYLE LISA 2 SENSOR) MISC, 1 each every 14 days, Disp: 2 each, Rfl: 11     diphenhydrAMINE (BENADRYL ALLERGY) 25 MG capsule, Take 1 capsule (25 mg) by mouth every 6 hours as needed for itching or allergies, Disp: 56 capsule, Rfl: 0     docusate sodium (COLACE) 100 MG capsule, Take 1 capsule (100 mg) by mouth 2 times daily as needed for constipation,, Disp: 60 capsule, Rfl: 0     estradiol (VAGIFEM) 10 MCG TABS vaginal tablet, Place 1 tablet (10 mcg) vaginally twice a week, Disp: 24 tablet, Rfl: 3     famotidine (PEPCID) 20 MG tablet, Take 1 tablet (20 mg) by mouth 2 times daily, Disp: 90 tablet, Rfl: 3     fish oil-omega-3 fatty acids 1000 MG capsule, , Disp: , Rfl:      FOLIC ACID PO, Take 1 mg by mouth daily, Disp: , Rfl:      glucose 40 % GEL gel, Take 15-30 g by mouth every 15 minutes as needed for low blood sugar, Disp: 3 Tube, Rfl: 2     ibuprofen (ADVIL/MOTRIN) 400 MG tablet, Take 1 tablet (400 mg) by mouth every 6 hours as needed for moderate pain, Disp: 30 tablet, Rfl: 0     levothyroxine (SYNTHROID) 137 MCG tablet, Take 1 tablet (137 mcg) by mouth daily, Disp: 90 tablet, Rfl: 3     lubiprostone (AMITIZA) 8 MCG  capsule, Take 3 capsules in AM and 2 capsules in PM  More refills after virtual visit with Ms Marti, Disp: 150 capsule, Rfl: 11     magnesium oxide 200 MG TABS, 1,000 mg , Disp: , Rfl:      montelukast (SINGULAIR) 10 MG tablet, TAKE 1 TABLET(10 MG) BY MOUTH AT BEDTIME, Disp: 90 tablet, Rfl: 3     multivitamin CF formula (CHOICEFUL) capsule, Take 1 tablet by mouth daily, Disp: , Rfl: 11     Nutritional Supplements (BOOST HIGH PROTEIN) LIQD, After above baseline labs are drawn, give: 6 mL/kg to maximum of 360 mL; the beverage is to be consumed within 5 minutes., Disp: , Rfl: 0     omeprazole (PRILOSEC) 40 MG DR capsule, Take 1 capsule (40 mg) by mouth 2 times daily, Disp: 180 capsule, Rfl: 3     order for DME, Equipment being ordered:Cefaly, Disp: 1 Device, Rfl: 0     prochlorperazine (COMPAZINE) 5 MG tablet, Take two 5 mg tablets by mouth every six hours as needed for nausea., Disp: 90 tablet, Rfl: 3     promethazine (PHENERGAN) 25 MG tablet, Take 1 tablet (25 mg) by mouth At Bedtime, Disp: 90 tablet, Rfl: 3     senna-docusate (SENOKOT-S;PERICOLACE) 8.6-50 MG per tablet, Take 1-2 tablets by mouth 2 times daily, Disp: 100 tablet, Rfl: 0     Sharps Container (BD SHARPS ) MISC, 1 Container as needed, Disp: 1 each, Rfl: 1     ZOLMitriptan (ZOMIG) 5 MG tablet, Take 1 tablet (5 mg) by mouth at onset of headache for migraine, Disp: 9 tablet, Rfl: 3    SOCIAL HISTORY:  Social History     Socioeconomic History     Marital status:      Spouse name: Norris     Number of children: 3     Years of education: 16     Highest education level: Not on file   Occupational History     Occupation: director     Employer: [employer]   Social Needs     Financial resource strain: Not on file     Food insecurity     Worry: Not on file     Inability: Not on file     Transportation needs     Medical: Not on file     Non-medical: Not on file   Tobacco Use     Smoking status: Former Smoker     Packs/day: 0.50     Years: 6.00     Pack  years: 3.00     Types: Cigarettes     Start date: 1985     Quit date: 1992     Years since quittin.2     Smokeless tobacco: Never Used     Tobacco comment: no 2nd hand   Substance and Sexual Activity     Alcohol use: Yes     Alcohol/week: 3.0 - 6.0 standard drinks     Types: 1 - 2 Glasses of wine, 1 - 2 Cans of beer, 1 - 2 Shots of liquor per week     Comment: occasional     Drug use: No     Sexual activity: Yes     Partners: Male     Birth control/protection: None     Comment: Hystectomy 1999   Lifestyle     Physical activity     Days per week: Not on file     Minutes per session: Not on file     Stress: Not on file   Relationships     Social connections     Talks on phone: Not on file     Gets together: Not on file     Attends [employer] service: Not on file     Active member of club or organization: Not on file     Attends meetings of clubs or organizations: Not on file     Relationship status: Not on file     Intimate partner violence     Fear of current or ex partner: Not on file     Emotionally abused: Not on file     Physically abused: Not on file     Forced sexual activity: Not on file   Other Topics Concern     Parent/sibling w/ CABG, MI or angioplasty before 65F 55M? No      Service Not Asked     Blood Transfusions No     Caffeine Concern Not Asked     Occupational Exposure Not Asked     Hobby Hazards Not Asked     Sleep Concern Not Asked     Stress Concern Not Asked     Weight Concern Not Asked     Special Diet Not Asked     Back Care Not Asked     Exercise Not Asked     Bike Helmet Not Asked     Seat Belt Yes     Self-Exams Not Asked   Social History Narrative     with 3 children and a dog.  No smoking, etoh or drug use.  Worked as a  for [employer] in [place] in the past.  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       FAMILY HISTORY:  Family History   Problem Relation Age of Onset     Eye Disorder Father         cataract,  detached retina     Myocardial Infarction Father 60     Lipids Father      Cerebrovascular Disease Father      Depression Father      Substance Abuse Father      Anesthesia Reaction Father         stroke right after surgery     Cataracts Father      Osteoarthritis Father      Ulcerative Colitis Father      Lipids Mother      Hypertension Mother      Thyroid Disease Mother      Depression Mother      Angina Mother      GERD Mother      Skin Cancer Mother      Eye Disorder Son         ptosis     Eye Disorder Paternal Grandmother         cataract     Eye Disorder Paternal Grandfather         cataract     Diabetes Paternal Grandfather      Eye Disorder Maternal Grandmother         cataract     Thyroid Disease Maternal Grandmother      Coronary Artery Disease Sister         angioplasty     GERD Sister      Substance Abuse Sister      Depression Sister      Depression Son      Anxiety Disorder Son      Thyroid Disease Sister      Diabetes Maternal Grandfather      Depression Nephew      Anxiety Disorder Nephew      Thyroid Disease Nephew      Diabetes Type 2  Cousin         paternal cousin     Heart Disease Paternal Aunt      Diabetes Paternal Aunt      Diabetes Paternal Uncle      Heart Disease Paternal Uncle      Past/family/social history reviewed and no changes    PHYSICAL EXAMINATION:  General appearance: Healthy appearing adult, in no acute distress  Eyes: Sclera anicteric, Pupils round and reactive to light  Ears, nose, mouth and throat: No obvious external lesions of ears and nose, Hearing intact  Neck: Symmetric, No obvious external lesions  Respiratory: Normal respiration, no use of accessory muscles   Skin: No rashes or jaundice   Psychiatric: Oriented to person, place and time, Appropriate mood and affect.       PERTINENT STUDIES:  Orders Only on 09/13/2018   Component Date Value Ref Range Status     WBC 09/13/2018 5.8  4.0 - 11.0 10e9/L Final     RBC Count 09/13/2018 4.51  3.8 - 5.2 10e12/L Final      Hemoglobin 09/13/2018 14.1  11.7 - 15.7 g/dL Final     Hematocrit 09/13/2018 42.1  35.0 - 47.0 % Final     MCV 09/13/2018 93  78 - 100 fl Final     MCH 09/13/2018 31.3  26.5 - 33.0 pg Final     MCHC 09/13/2018 33.5  31.5 - 36.5 g/dL Final     RDW 09/13/2018 13.2  10.0 - 15.0 % Final     Platelet Count 09/13/2018 430  150 - 450 10e9/L Final     Diff Method 09/13/2018 Automated Method   Final     % Neutrophils 09/13/2018 37.1  % Final     % Lymphocytes 09/13/2018 40.5  % Final     % Monocytes 09/13/2018 10.5  % Final     % Eosinophils 09/13/2018 9.8  % Final     % Basophils 09/13/2018 2.1  % Final     % Immature Granulocytes 09/13/2018 0.0  % Final     Nucleated RBCs 09/13/2018 0  0 /100 Final     Absolute Neutrophil 09/13/2018 2.2  1.6 - 8.3 10e9/L Final     Absolute Lymphocytes 09/13/2018 2.4  0.8 - 5.3 10e9/L Final     Absolute Monocytes 09/13/2018 0.6  0.0 - 1.3 10e9/L Final     Absolute Eosinophils 09/13/2018 0.6  0.0 - 0.7 10e9/L Final     Absolute Basophils 09/13/2018 0.1  0.0 - 0.2 10e9/L Final     Abs Immature Granulocytes 09/13/2018 0.0  0 - 0.4 10e9/L Final     Absolute Nucleated RBC 09/13/2018 0.0   Final     Sodium 09/13/2018 138  133 - 144 mmol/L Final     Potassium 09/13/2018 4.3  3.4 - 5.3 mmol/L Final     Chloride 09/13/2018 102  94 - 109 mmol/L Final     Carbon Dioxide 09/13/2018 30  20 - 32 mmol/L Final     Anion Gap 09/13/2018 5  3 - 14 mmol/L Final     Glucose 09/13/2018 119* 70 - 99 mg/dL Final     Urea Nitrogen 09/13/2018 12  7 - 30 mg/dL Final     Creatinine 09/13/2018 0.83  0.52 - 1.04 mg/dL Final     GFR Estimate 09/13/2018 72  >60 mL/min/1.7m2 Final     GFR Estimate If Black 09/13/2018 87  >60 mL/min/1.7m2 Final     Calcium 09/13/2018 9.2  8.5 - 10.1 mg/dL Final     Bilirubin Direct 09/13/2018 0.2  0.0 - 0.2 mg/dL Final     Bilirubin Total 09/13/2018 0.8  0.2 - 1.3 mg/dL Final     Albumin 09/13/2018 3.9  3.4 - 5.0 g/dL Final     Protein Total 09/13/2018 7.8  6.8 - 8.8 g/dL Final     Alkaline  Phosphatase 09/13/2018 145  40 - 150 U/L Final     ALT 09/13/2018 59* 0 - 50 U/L Final     AST 09/13/2018 39  0 - 45 U/L Final     IGG 09/13/2018 1160  695 - 1620 mg/dL Final     IgG1 09/13/2018 416  300 - 856 mg/dL Final     IgG2 09/13/2018 384  158 - 761 mg/dL Final     IgG3 09/13/2018 83  24 - 192 mg/dL Final     IgG4 09/13/2018 95* 11 - 86 mg/dL Final       Again, thank you for allowing me to participate in the care of your patient.        Sincerely,        Rosanne Marti PA-C

## 2021-03-26 NOTE — TELEPHONE ENCOUNTER
Shannan,    Please print out the PT referral on 3/23/21 and fax it to [phone] .    Thank you.      Referral faxed.  Shannan Edwards RN 12:53 PM on 3/26/2021.

## 2021-03-31 DIAGNOSIS — K21.9 GASTROESOPHAGEAL REFLUX DISEASE WITHOUT ESOPHAGITIS: ICD-10-CM

## 2021-03-31 RX ORDER — FAMOTIDINE 20 MG/1
20 TABLET, FILM COATED ORAL 2 TIMES DAILY
Qty: 180 TABLET | Refills: 3 | Status: SHIPPED | OUTPATIENT
Start: 2021-03-31 | End: 2022-02-17

## 2021-04-01 ENCOUNTER — OFFICE VISIT (OUTPATIENT)
Dept: TRANSPLANT | Facility: CLINIC | Age: 56
End: 2021-04-01
Attending: NURSE PRACTITIONER
Payer: COMMERCIAL

## 2021-04-01 VITALS
HEART RATE: 117 BPM | OXYGEN SATURATION: 96 % | DIASTOLIC BLOOD PRESSURE: 74 MMHG | WEIGHT: 153.3 LBS | SYSTOLIC BLOOD PRESSURE: 106 MMHG | BODY MASS INDEX: 23.31 KG/M2

## 2021-04-01 DIAGNOSIS — E13.9 POST-PANCREATECTOMY DIABETES (H): Primary | ICD-10-CM

## 2021-04-01 DIAGNOSIS — Z90.410 POST-PANCREATECTOMY DIABETES (H): Primary | ICD-10-CM

## 2021-04-01 DIAGNOSIS — E89.1 POST-PANCREATECTOMY DIABETES (H): Primary | ICD-10-CM

## 2021-04-01 LAB — HBA1C MFR BLD: 5.9 % (ref 0–5.6)

## 2021-04-01 PROCEDURE — 99204 OFFICE O/P NEW MOD 45 MIN: CPT | Performed by: PEDIATRICS

## 2021-04-01 PROCEDURE — 83036 HEMOGLOBIN GLYCOSYLATED A1C: CPT | Performed by: PEDIATRICS

## 2021-04-01 ASSESSMENT — PAIN SCALES - GENERAL: PAINLEVEL: NO PAIN (0)

## 2021-04-01 NOTE — PATIENT INSTRUCTIONS
1)  Try Invokana 100 mg once daily.   We will see if this helps with some of the high excursions and low reactions.     2)  Keep using the Yandy 2 sensor so we can watch those meal times better than with fingersticks.    3)  Check in with me in 1-2 weeks by email, let me know how it is going.  If this seems to be helping then we will also get you some urine ketone sticks to check 1-2 times per month -- making sure they are negative or trace/small but not moderate to large.     August 5 at 3:00pm.

## 2021-04-02 NOTE — PROGRESS NOTES
[facility]  Islet Autotransplant, Diabetes Follow Up    Problem List:  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     Incisional hernia, without obstruction or gangrene     Adhesive capsulitis of shoulder, unspecified laterality     S/P hernia repair     Headache, chronic migraine without aura     Chronic pain syndrome     Iron deficiency anemia       HPI:  Dinora is a 56 year old female here for follow up oflaparoscopic assisted total pancreatectomy, islet cell autotransplant, splenectomy, gastrojejunostomy tube revision, choledochoduodenostomy, duodenojejunostomy, Vera-Y reconstruction performed on 2016.  At the time of the procedure, the patient received:  Total Islet number: [id number].  Total Islet number/k.  Islet equivalents: [id number]  Islet equivalents/kilogram: 4091    Post-surgical course was complicated by two minor procedures for a retained foreign body near GJ site in 2017 and hernia repair 2018, and more recently by gastroparesis.  She has been insulin independent since about 1 year after surgery.    At today's visit, Dinora remains off insulin.  However, because of her gastroparesis diet-- which has more high carb, high glycemic index foods, she has pretty regular meal excursions daily on her sensor where the BG goes up quickly to 200-260s and then rapidly declines back to normal.  Although most  often does not drop below 70 mg/dL, she becomes symptomatic at these times, likely a response to the rapid fall in glucose as her islets quick in and respond to the hyperglycemia.  This is also causing her to get tired out after these episodes.  She is still not on any diabetes medications or insulin.  Her average glucose and A1c are in an acceptable range.     Diabetes history:  Current insulin regimen:  none    Wearing Yandy  2/21- 3/20 logs  Avg glucose 136 mg/dL  TIR  is 90%, >180 is 9%, and <70 is 0%    Recent hemoglobin A1c levels:  Lab Results   Component Value Date    A1C 5.9 04/01/2021    A1C 5.5 12/17/2020    A1C 5.8 07/30/2020    A1C 5.6 01/31/2020    A1C 5.8 09/17/2019     Hypoglycemia history:  Minimal <70 but symptomatic lows at 80s.  The patient has had 0 episodes of severe hypoglycemia (seizure, coma, or neuroglycopenic symptoms severe enough to require assistance from another person).  Blood sugars were reviewed from the patient records and/or the meter download.        Review of systems:  A complete ROS is negative except as noted in HPI above.  With her gastroparesis, she does have vomiting and bloating.  She is stopping amitiza and mag citrate and starting a new motility med.    Past Medical and Surgical History:  Past Medical History:   Diagnosis Date     Allergic rhinitis, cause unspecified      Allergy to other foods     strawberries, apples, celeries, alice, watermelon     Arthritis     left knee     Choledocholithiasis     long after cholecystectomy     Chronic abdominal pain      Chronic constipation      Chronic nausea      Chronic pancreatitis (H)      Degeneration of lumbar or lumbosacral intervertebral disc      Esophageal reflux     w/ hiatal hernia     Gastroparesis      Hiatal hernia      History of pituitary adenoma     s/p resection     Hypothyroidism      Migraines      Mild hyperlipidemia      On tube feeding diet     presence of GJ tube     Pancreatic disease     PD  stricture, 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 endometriosis growth     APPENDECTOMY       C  DELIVERY ONLY       C  DELIVERY ONLY      repeat c section with incidental cystotomy with repair     C EXCIS PITUITARY,TRANSNASAL/SEPTAL  1980    pituitary tumor removed for diabetes insipidus     C TOTAL ABDOM HYSTERECTOMY      w/ bilateral salpingoophorectomy       SECTION       COLONOSCOPY       ENDOSCOPIC RETROGRADE CHOLANGIOPANCREATOGRAM N/A 2015    Procedure: ENDOSCOPIC RETROGRADE CHOLANGIOPANCREATOGRAM;  Surgeon: Mandeep Park MD;  [facility]     ENDOSCOPIC RETROGRADE CHOLANGIOPANCREATOGRAM N/A 2016    Procedure: COMBINED ENDOSCOPIC RETROGRADE CHOLANGIOPANCREATOGRAPHY, PLACE TUBE/STENT;  Surgeon: Mandeep Park MD;  [facility]     ENDOSCOPIC RETROGRADE CHOLANGIOPANCREATOGRAM N/A 3/17/2016    Procedure: COMBINED ENDOSCOPIC RETROGRADE CHOLANGIOPANCREATOGRAPHY, REMOVE FOREIGN BODY OR STENT/TUBE;  Surgeon: Mandeep Park MD;  [facility]     ENDOSCOPIC RETROGRADE CHOLANGIOPANCREATOGRAM N/A 2016    Procedure: COMBINED ENDOSCOPIC RETROGRADE CHOLANGIOPANCREATOGRAPHY, PLACE TUBE/STENT;  Surgeon: Mandeep Park MD;  [facility]     ENDOSCOPIC RETROGRADE CHOLANGIOPANCREATOGRAM N/A 2016    Procedure: COMBINED ENDOSCOPIC RETROGRADE CHOLANGIOPANCREATOGRAPHY, REMOVE FOREIGN BODY OR STENT/TUBE;  Surgeon: Mandeep Park MD;  Location:  OR     ENDOSCOPIC ULTRASOUND UPPER GASTROINTESTINAL TRACT (GI) N/A 10/3/2016    Procedure: ENDOSCOPIC ULTRASOUND, ESOPHAGOSCOPY / UPPER GASTROINTESTINAL TRACT (GI);  Surgeon: Guru Jose Rodas MD;  Location:  OR      ESOPHAGOSCOPY, GASTROSCOPY, DUODENOSCOPY (EGD), COMBINED N/A 6/24/2015    Procedure: COMBINED ESOPHAGOSCOPY, GASTROSCOPY, DUODENOSCOPY (EGD), REMOVE FOREIGN BODY;  Surgeon: Mandeep Park MD;  [facility]     ESOPHAGOSCOPY, GASTROSCOPY, DUODENOSCOPY (EGD), COMBINED N/A 10/25/2015    Procedure: COMBINED ESOPHAGOSCOPY, GASTROSCOPY, DUODENOSCOPY (EGD);  Surgeon: Sammy Amaro MD;  [facility]     ESOPHAGOSCOPY, GASTROSCOPY, DUODENOSCOPY (EGD), COMBINED N/A 10/25/2015    Procedure: COMBINED ESOPHAGOSCOPY, GASTROSCOPY, DUODENOSCOPY (EGD), BIOPSY SINGLE OR MULTIPLE;  Surgeon: Sammy Amaro MD;  [facility]     ESOPHAGOSCOPY, GASTROSCOPY, DUODENOSCOPY (EGD), DILATATION, COMBINED       EXCISE LESION TRUNK N/A 4/17/2017    Procedure: EXCISE LESION TRUNK;  Removal of Abdominal Foreign Body;  Surgeon: Nestor Phoenix MD;  [facility]     HC ESOPH/GAS REFLUX TEST W NASAL IMPED >1 HR N/A 11/19/2015    Procedure: ESOPHAGEAL IMPEDENCE FUNCTION TEST WITH 24 HOUR PH GREATER THAN 1 HOUR;  Surgeon: Thiago Apple MD;  [facility]: [facility]     HC UGI ENDOSCOPY DIAG W BIOPSY  9/17/08     HC UGI ENDOSCOPY DIAG W BIOPSY  9/27/12     HC UGI ENDOSCOPY W ESOPHAGEAL DILATION BALLOON <30MM  9/17/08     HC UGI ENDOSCOPY W EUS N/A 5/5/2015    Procedure: COMBINED ENDOSCOPIC ULTRASOUND, ESOPHAGOSCOPY, GASTROSCOPY, DUODENOSCOPY (EGD);  Surgeon: Wm Dueñas MD;  [facility]: [facility]     HC WRIST ARTHROSCOP,RELEASE XVERS LIG Bilateral 12/17/08     INJECT TRANSVERSUS ABDOMINIS PLANE (TAP) BLOCK BILATERAL Left 9/22/2016    Procedure: INJECT TRANSVERSUS ABDOMINIS PLANE (TAP) BLOCK BILATERAL;  Surgeon: Dickson Corrigan MD;  [facility]     laparoscopic pineda  1995     LAPAROSCOPIC HERNIORRHAPHY INCISIONAL N/A 8/23/2018    Procedure: LAPAROSCOPIC HERNIORRHAPHY INCISIONAL;  Laparoscopic Incisional Hernia Repair with Symbotex Mesh Implant;  Surgeon: Nestor Phoenix MD;  [facility]     LAPAROSCOPIC  PANCREATECTOMY, TRANSPLANT AUTO ISLET CELL N/A 12/28/2016    Procedure: LAPAROSCOPIC PANCREATECTOMY, TRANSPLANT AUTO ISLET CELL;  Surgeon: Nestor Phoenix MD;  Location[facility]     transphenoidal pituitary resection  1990       Family History:  New changes since last visit:  none  Family History   Problem Relation Age of Onset     Eye Disorder Father         cataract, detached retina     Myocardial Infarction Father 60     Lipids Father      Cerebrovascular Disease Father      Depression Father      Substance Abuse Father      Anesthesia Reaction Father         stroke right after surgery     Cataracts Father      Osteoarthritis Father      Ulcerative Colitis Father      Lipids Mother      Hypertension Mother      Thyroid Disease Mother      Depression Mother      Angina Mother      GERD Mother      Skin Cancer Mother      Eye Disorder Son         ptosis     Eye Disorder Paternal Grandmother         cataract     Eye Disorder Paternal Grandfather         cataract     Diabetes Paternal Grandfather      Eye Disorder Maternal Grandmother         cataract     Thyroid Disease Maternal Grandmother      Coronary Artery Disease Sister         angioplasty     GERD Sister      Substance Abuse Sister      Depression Sister      Depression Son      Anxiety Disorder Son      Thyroid Disease Sister      Diabetes Maternal Grandfather      Depression Nephew      Anxiety Disorder Nephew      Thyroid Disease Nephew      Diabetes Type 2  Cousin         paternal cousin     Heart Disease Paternal Aunt      Diabetes Paternal Aunt      Diabetes Paternal Uncle      Heart Disease Paternal Uncle        Social History:  Social History     Social History Narrative     with 3 children and a dog.  No smoking, etoh or drug use.  Worked as a  for [employer] in [place] in the past.  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     Physical Exam:  Vitals: /74   Pulse 117   Wt 69.5 kg (153 lb 4.8 oz)   SpO2 96%   BMI 23.31 kg/m    BMI= Body mass index is 23.31 kg/m .  General:  Well-appearing, NAD  Injection sites:  Intact without lipohypertrophy  Psych:  Communicative, with normal affect         Assessment:  1.  Post pancreatectomy diabetes mellitus, s/p total pancreatectomy and islet autotransplant.  (ICD-10 E89.1)  2.  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  She has been insulin independent with A1c in goal range.  However, due to gastroparesis she is having more hyperglycemia and rapid drops after meals.  Changing to a hypoglycemia diet is not an option due to her gastroparesis.  WE could try acarbose but I am afraid it would exacerbate bloating and GI issues.  We discussed trying an SGLT-2 inhibitor as a diabetes drug, with the goal to reduce the high excursions and thereby also reduce the rapid fall in glucose that happens after these high excursions.  I did caution her that if this seems to help her glycemic management, I would have her check urine ketones periodically because the risk of using a SGLT-2 when not on insulin is that if her islet graft is not producing enough insulin, she could become ketotic without having the usual warning signs of hyperglycemia.     Plan:  1.  Changes to current diabetes regimen:  Patient Instructions   1)  Try Invokana 100 mg once daily.   We will see if this helps with some of the high excursions and low reactions.     2)  Keep using the Yandy 2 sensor so we can watch those meal times better than with fingersticks.    3)  Check in with me in 1-2 weeks by email, let me know how it is going.  If this seems to be helping then we  will also get you some urine ketone sticks to check 1-2 times per month -- making sure they are negative or trace/small but not moderate to large.     August 5 at 3:00pm.      2.  Frequency of blood sugar checks:  Keep wearing Yandy-- this is much better for Dinora than fingersticks because with fingersticks we miss the really critical data of the post prandial excursions.    3.  Continue routine follow up for autoislet transplant patients:  Mixed meal test (6 mL/kg BoostHP to max of 360 mL) at 3 months, 6 months, and once a year post transplant.  Hemoglobin A1c levels at these time points and quarterly.    4.  Other issues addressed today:  none    Follow up:  4 mos    Contact me for questions at [phone] or [phone].  Emergency number to reach pediatric endocrinology after hours is [phone].        Dr. Gloria Melissa MD  [facility]  Director of Research, Islet Auto-transplant Program  Phone:  [phone]  Electronically signed: April 2, 2021 at 12:49 PM      Review of the result(s) of each unique test - HbA1c, yandy  40 minutes spent on the date of the encounter doing chart review, history and exam, documentation, downloading and reviewing CGM data,  and further activities per the note

## 2021-04-08 ENCOUNTER — PATIENT OUTREACH (OUTPATIENT)
Dept: GASTROENTEROLOGY | Facility: CLINIC | Age: 56
End: 2021-04-08

## 2021-04-09 NOTE — PROGRESS NOTES
Prior Authorization Specialty Medication Request    Medication/Dose:   Prucalopride Succinate (MOTEGRITY) 2 MG TABS 30 tablet 11 3/26/2021  --   Sig - Route: Take 2 mg by mouth daily - Oral        ICD code (if different than what is on RX):    Previously Tried and Failed:      Important Lab Values:   Rationale:     Insurance Name:   Insurance ID:   Insurance Phone Number:     Pharmacy Information (if different than what is on RX)  Name:    Phone:

## 2021-04-09 NOTE — TELEPHONE ENCOUNTER
PA Initiation    Medication: Prucalopride Succinate (MOTEGRITY) 2 MG TABS   Insurance Company: [employer] - Phone [phone] Fax [phone]  Pharmacy Filling the Rx: [id number] - [place], [place] - [address] AT [facility] [address]  Filling Pharmacy Phone: [phone]  Filling Pharmacy Fax: [phone]  Start Date: 4/9/2021

## 2021-04-12 NOTE — TELEPHONE ENCOUNTER
PRIOR AUTHORIZATION DENIED    Medication: Prucalopride Succinate (MOTEGRITY) 2 MG TABS--DENIED    Denial Date: 4/9/2021    Denial Rational: Patient needs to try and fail Tr[facility]    Appeal Information:

## 2021-04-30 ENCOUNTER — MYC MEDICAL ADVICE (OUTPATIENT)
Dept: TRANSPLANT | Facility: CLINIC | Age: 56
End: 2021-04-30

## 2021-04-30 DIAGNOSIS — E13.9 POST-PANCREATECTOMY DIABETES (H): Primary | ICD-10-CM

## 2021-04-30 DIAGNOSIS — Z90.410 POST-PANCREATECTOMY DIABETES (H): Primary | ICD-10-CM

## 2021-04-30 DIAGNOSIS — E89.1 POST-PANCREATECTOMY DIABETES (H): Primary | ICD-10-CM

## 2021-05-03 ENCOUNTER — MYC MEDICAL ADVICE (OUTPATIENT)
Dept: GASTROENTEROLOGY | Facility: CLINIC | Age: 56
End: 2021-05-03

## 2021-05-03 DIAGNOSIS — K59.04 CHRONIC IDIOPATHIC CONSTIPATION: Primary | ICD-10-CM

## 2021-05-05 RX ORDER — PLECANATIDE 3 MG/1
3 TABLET ORAL DAILY
Qty: 30 TABLET | Refills: 11 | Status: SHIPPED | OUTPATIENT
Start: 2021-05-05 | End: 2021-05-21 | Stop reason: SINTOL

## 2021-05-06 DIAGNOSIS — K31.84 GASTROPARESIS: Primary | ICD-10-CM

## 2021-05-11 ENCOUNTER — PATIENT OUTREACH (OUTPATIENT)
Dept: GASTROENTEROLOGY | Facility: CLINIC | Age: 56
End: 2021-05-11

## 2021-05-11 DIAGNOSIS — Z90.410 POST-PANCREATECTOMY DIABETES (H): Primary | ICD-10-CM

## 2021-05-11 DIAGNOSIS — E89.1 POST-PANCREATECTOMY DIABETES (H): Primary | ICD-10-CM

## 2021-05-11 DIAGNOSIS — E13.9 POST-PANCREATECTOMY DIABETES (H): Primary | ICD-10-CM

## 2021-05-11 NOTE — TELEPHONE ENCOUNTER
Central Prior Authorization Team   Phone: [phone]      PA Initiation    Medication: plecanatide (TRULANCE) 3 MG tablet  Insurance Company: [employer] - Phone [phone] Fax [phone]  Pharmacy Filling the Rx: [id number] [place] [address]  AT [facility] OF [address]  Filling Pharmacy Phone: [phone]  Filling Pharmacy Fax:    Start Date: 5/11/2021

## 2021-05-11 NOTE — PROGRESS NOTES
Prior Authorization Retail Medication Request    Medication/Dose:   plecanatide (TRULANCE) 3 MG tablet 30 tablet 11 5/5/2021  --   Sig - Route: Take 1 tablet (3 mg) by mouth daily - Oral       ICD code (if different than what is on RX):    Previously Tried and Failed:  Motegrity and Linzess and Amitiza  Rationale:      Insurance Name:    Insurance ID:        Pharmacy Information (if different than what is on RX)  Name:    Phone:

## 2021-05-12 ENCOUNTER — ALLIED HEALTH/NURSE VISIT (OUTPATIENT)
Dept: TRANSPLANT | Facility: CLINIC | Age: 56
End: 2021-05-12
Attending: PEDIATRICS
Payer: COMMERCIAL

## 2021-05-12 DIAGNOSIS — Z90.410 POST-PANCREATECTOMY DIABETES (H): Primary | ICD-10-CM

## 2021-05-12 DIAGNOSIS — E89.1 POST-PANCREATECTOMY DIABETES (H): Primary | ICD-10-CM

## 2021-05-12 DIAGNOSIS — E13.9 POST-PANCREATECTOMY DIABETES (H): Primary | ICD-10-CM

## 2021-05-12 LAB — HBA1C MFR BLD: 5.7 % (ref 0–5.7)

## 2021-05-13 NOTE — TELEPHONE ENCOUNTER
Prior Authorization Approval    Authorization Effective Date: 5/12/2021  Authorization Expiration Date: 5/12/2022  Medication: plecanatide (TRULANCE) 3 MG tablet-APPROVED  Approved Dose/Quantity:   Reference #:     Insurance Company: [employer] - Phone [phone] Fax [phone]  Expected CoPay:       CoPay Card Available:      Foundation Assistance Needed:    Which Pharmacy is filling the prescription (Not needed for infusion/clinic administered): "[id number] - [place], [place] [address] AT [facility] OF [facility] [address]   Pharmacy Notified: Yes-Pharmacy will notify patient when ready.  Patient Notified: No

## 2021-05-19 DIAGNOSIS — R51.9 HEADACHE DISORDER: ICD-10-CM

## 2021-05-20 RX ORDER — ZOLMITRIPTAN 5 MG/1
5 TABLET, FILM COATED ORAL
Qty: 9 TABLET | Refills: 3 | Status: SHIPPED | OUTPATIENT
Start: 2021-05-20 | End: 2021-09-16

## 2021-05-21 DIAGNOSIS — E13.9 POST-PANCREATECTOMY DIABETES (H): Primary | ICD-10-CM

## 2021-05-21 DIAGNOSIS — E16.2 HYPOGLYCEMIA: ICD-10-CM

## 2021-05-21 DIAGNOSIS — K59.04 CHRONIC IDIOPATHIC CONSTIPATION: ICD-10-CM

## 2021-05-21 DIAGNOSIS — Z90.410 POST-PANCREATECTOMY DIABETES (H): Primary | ICD-10-CM

## 2021-05-21 DIAGNOSIS — E89.1 POST-PANCREATECTOMY DIABETES (H): Primary | ICD-10-CM

## 2021-05-21 RX ORDER — PRUCALOPRIDE 2 MG/1
2 TABLET, FILM COATED ORAL DAILY
Qty: 30 TABLET | Refills: 11 | Status: SHIPPED | OUTPATIENT
Start: 2021-05-21 | End: 2022-05-24

## 2021-05-21 RX ORDER — ACARBOSE 50 MG/1
TABLET ORAL
Qty: 180 TABLET | Refills: 3 | Status: ON HOLD | OUTPATIENT
Start: 2021-05-21 | End: 2021-09-11

## 2021-05-21 NOTE — CONFIDENTIAL NOTE
Patient tried trulance and was unable to tolerate the medication due to diarrhea. She had a similar reaction to Linzess. Plan to start motegrity if approved by insurance.     Rosanne Marti PA-C  Division of Gastroenterology, Hepatology & Nutrition  [facility]

## 2021-06-04 ENCOUNTER — VIRTUAL VISIT (OUTPATIENT)
Dept: GASTROENTEROLOGY | Facility: CLINIC | Age: 56
End: 2021-06-04
Payer: COMMERCIAL

## 2021-06-04 VITALS — WEIGHT: 148 LBS | BODY MASS INDEX: 22.5 KG/M2

## 2021-06-04 DIAGNOSIS — K58.9 IRRITABLE BOWEL SYNDROME, UNSPECIFIED TYPE: Primary | ICD-10-CM

## 2021-06-04 DIAGNOSIS — K59.00 CONSTIPATION, UNSPECIFIED CONSTIPATION TYPE: ICD-10-CM

## 2021-06-04 PROCEDURE — 99417 PROLNG OP E/M EACH 15 MIN: CPT | Performed by: PHYSICIAN ASSISTANT

## 2021-06-04 PROCEDURE — 99215 OFFICE O/P EST HI 40 MIN: CPT | Mod: 95 | Performed by: PHYSICIAN ASSISTANT

## 2021-06-04 RX ORDER — BISACODYL 5 MG
15 TABLET, DELAYED RELEASE (ENTERIC COATED) ORAL ONCE
Qty: 4 TABLET | Refills: 0 | Status: CANCELLED | OUTPATIENT
Start: 2021-06-04 | End: 2021-06-04

## 2021-06-04 NOTE — PATIENT INSTRUCTIONS
It was a pleasure taking care of you today.  I've included a brief summary of our discussion and care plan from today's visit below.  Please review this information with your primary care provider.  ______________________________________________________________________    My recommendations are summarized as follows:    -- Golytely prep: fill the container to the 4 liter fill line, shake vigorously several times, and drink a cup every 10 minutes until you drink the entire container. You do not have to finish the entire bottle, but the more you drink the more likely you will be emptied. Do this on the weekend when you can be near a bathroom.   -- rifaximin 550 mg three times a day for 14 days. If this is too expensive, we can always use a different antibiotic. This has significantly helped you in the past!   --Continue Amitiza and max dose magnesium oxide  --I messaged our pharmacy team to see other options of getting motegrity   --Continue good water intake, exercise  --Follow dietary recommendations as you have discussed with Quyen. It is okay to go to a more liquid diet if symptoms worsen.  Continue to monitor your weight  -- meet with our pharmacy team to discuss the following:    -1: are your current medications contributing to symptoms? Can we adjust them?   -2: Would buspar be safe to take with your other migraine medications?   -- meet with Dr. Sanchez to discuss the mind-gut connection. If you would like a referral to see psychiatry, please let me know   -- I have spoken to our care coordinator Melyssa- next visit will be with Dr. Genao. Please call if you have not heard about an appointment     Return to GI Clinic in 2 months w/ Dr. Powers to review your progress.    ______________________________________________________________________    How do I schedule labs, imaging studies, or procedures that were ordered in clinic today?     Labs: To schedule lab appointment at [facility], use my  chart or call [phone]. If you have a [facility] lab closer to home where you are regularly seen you can give them a call.     Procedures: If a colonoscopy, upper endoscopy, breath test, esophageal manometry, or pH impedence was ordered today, our endoscopy team will call you to schedule this. If you have not heard from our endoscopy team within a week, please call [phone] to schedule.     Imaging Studies: If you were scheduled for a CT scan, X-ray, MRI, ultrasound, HIDA scan or other imaging study, please call [phone] to have this scheduled.     Referral: If a referral to another specialty was ordered, expect a phone call or follow instructions above. If you have not heard from anyone regarding your referral in a week, please call our clinic to check the status.     Who do I call with any questions after my visit?  Please be in touch if there are any further questions that arise following today's visit.  There are multiple ways to contact your gastroenterology care team.        During business hours, you may reach a Gastroenterology nurse at [phone]      To schedule or reschedule an appointment, please call [phone].       You can always send a secure message through Stublisher.  Stublisher messages are answered by your nurse or doctor typically within 24 hours.  Please allow extra time on weekends and holidays.        For urgent/emergent questions after business hours, you may reach the on-call GI Fellow by contacting the [facility]  at [phone].     How will I get the results of any tests ordered?    You will receive all of your results.  If you have signed up for Stublisher, any tests ordered at your visit will be available to you after your physician reviews them.  Typically this takes 1-2 weeks.  If there are urgent results that require a change in your care plan, your physician or nurse will call you to discuss the next steps.      What is Stublisher?  Stublisher is a secure way  for you to access all of your healthcare records from the [facility].  It is a web based computer program, so you can sign on to it from any location.  It also allows you to send secure messages to your care team.  I recommend signing up for Audinate access if you have not already done so and are comfortable with using a computer.      How to I schedule a follow-up visit?  If you did not schedule a follow-up visit today, please call [phone] to schedule a follow-up office visit.      Sincerely,    Rosanne Marti PA-C  Division of Gastroenterology, Hepatology & Nutrition  [facility]

## 2021-06-04 NOTE — PROGRESS NOTES
"Dinora Mcghee is a 56 year old female who is being evaluated via a billable video visit.      The patient has been notified of following:     \"This video visit will be conducted via a call between you and your physician/provider. We have found that certain health care needs can be provided without the need for an in-person physical exam.  This service lets us provide the care you need with a video conversation.  If a prescription is necessary we can send it directly to your pharmacy.  If lab work is needed we can place an order for that and you can then stop by our lab to have the test done at a later time.    If during the course of the call the physician/provider feels a video visit is not appropriate, you will not be charged for this service.\"     Patient confirmed that they are in [place] for today's visit yes.    Video-Visit Details  Type of service:  Video Visit    Video Start Time: 10:09 PM  Video End Time:  11:01 PM    Originating Location (pt. 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     1.  Abdominal pain, nausea/vomiting, constipation  Patient reports worsening abdominal pain, nausea and vomiting starting in November 2020.  Of note, had been working on a more vegan diet around the time.  Was evaluated in the emergency department with unremarkable CT scan and lab work-up.  " Then had small bowel follow-through without evidence of stricture or obstruction.  Patient met with our GI dietitian to discuss the gastroparesis diet which initially did seem to make a big improvement in her symptoms, though they did not return to baseline.  She finds the gastroparesis diet very difficult in the setting of diabetes, and living to cook.  We again discussed medical options for gastroparesis today, though we are limited as she has reactions with Reglan.  Could also consider BuSpar for gastric accommodation, low concern would be that this could interact with her Imitrex.  I would like her to meet with our pharmacy team to discuss her medications in depth to see if her medications are contributing to symptoms, also to discuss safety profile of BuSpar for functional dyspepsia/gastroparesis.    Patient also reports significant debilitating bloating shortly after eating.  This may be due to underlying motility issues, functional bloating, small intestinal bacterial overgrowth, irritable bowel syndrome.  She has benefited from course of rifaximin in the past.  Would recommend retreatment at this time.    In regards to her constipation, she completed a 1 week trial of Trulance which significantly worsened her symptoms.  She is now on Amitiza though her bowel movements have not returned to baseline.  Discussed that it may be beneficial to complete a bowel cleanout and then resume daily bowel meds.  Given likely motility issue contributing to both constipation and upper GI symptoms, it would be beneficial for her to be on Motegrity however this has been difficult to obtain due to to her insurance.  Will also discussed with our pharmacy team.    Patient was very tearful during today's visit, and noted while she was on the Trulance medication she was not sure she wanted to live.  I offered her a visit with a psychiatrist which she declined at this time.  We again discussed the significant mind gut connection,  and how GI symptoms are impacted by mental health.  She is very discouraged about trying future GI medications, but we discussed the importance of giving them a fair chance.  I did offer her a visit with our GI health psychologist which the patient agrees to, I messaged Dr. Sanchez to facilitate scheduling of this.  -- Golytely prep: fill the container to the 4 liter fill line, shake vigorously several times, and drink a cup every 10 minutes until you drink the entire container. You do not have to finish the entire bottle, but the more you drink the more likely you will be emptied. Do this on the weekend when you can be near a bathroom.   -- rifaximin 550 mg three times a day for 14 days. If this is too expensive, we can always use a different antibiotic. This has significantly helped you in the past!   --Continue Amitiza and max dose magnesium oxide  --I messaged our pharmacy team to see other options of getting motegrity   --Continue good water intake, exercise  --Follow dietary recommendations as you have discussed with Quyen. It is okay to go to a more liquid diet if symptoms worsen.  Continue to monitor your weight  -- meet with our pharmacy team to discuss the following:    -1: are your current medications contributing to symptoms? Can we adjust them?   -2: Would buspar be safe to take with your other migraine medications?   -- meet with Dr. Sanchez to discuss the mind-gut connection. If you would like a referral to see psychiatry, please let me know   -- I have spoken to our care coordinator Melyssa- next visit will be with Dr. Genao. Please call if you have not heard about an appointment     RTC 2 months     80 Minutes was spent on the date of the encounter during chart review, history and exam, documentation, and further activities as noted     Note completed using voice recognition software. Some word and grammatical errors may occur.    Rosanne Marti PA-C  Division of Gastroenterology, Hepatology &  Nutrition  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     History summarized from previous documentation from Dr. Genao. Please see previous notes for further details     Gastroparesis  Gastric emptying study with 2-hour study at [facility] have been consistent with gastroparesis, patient had follow-up study here 6/27/2016 with 49% remaining at 4 hours however the patient was not aware she was on narcotics at the time.  Had GJ tubes in fall 2016, NG tube used for venting with tube feeds and was able to wean off of tube feeds after TPAIT.  At initial visit she reported severe migraine headaches associated with vomiting and is on amitriptyline for migraine prophylaxis.  Patient has used several medication for the symptoms including scopolamine patch, prochlorperazine, promethazine which helped symptoms.  She has also been seen by neurology and psychology due to concern that she has a difficult time sleeping and with migraines.  At previous visits, reported nausea with occasional episodes of vomiting.  Her regimen was previously well managed with Compazine in the morning, Phenergan at night.  She did note that nausea and vomiting worsened with migraines.    Patient reports that she had been feeling pretty well up until early November.  Early November the patient noted worsening symptoms.  He was seen at the [facility] emergency department on November 18.  At that time, blood work notable for normal BMP, low lipase, unremarkable hepatic panel.  CBC without elevation of WBCs though she did have  elevated platelets, neutrophils and lymphocytes.  CT at that time with moderate stool burden, nonspecific fluid in small intestine.  No evidence of obstruction per documentation. Had similar episodes after this. This had improved with juicing foods.  States that she has 6-7 small meals a day with about a cup of food at each meal.  States that foods such as toast, bread, gluten seem to go better than healthier foods.  Is having a difficult time balancing foods that she enjoys eating, foods that cause symptoms and foods that are healthy.  Will have nausea vomiting and pain with food triggers.  Of note, patient reported increased stress in the setting of a son who is struggling with his own mental health.  At her visits this spring, she reported that symptoms did seem to be improved with strict dietary measures.     Today: She reports that most bothersome symptom is bloating shortly after eating, feeling full.  She is worried she is not getting adequate nutrients.  She does not want to have a feeding tube again.  The gastroparesis diet/liquid diet is very difficult to balance her blood sugars, and she misses eating foods that he likes to cook.  Patient is extremely distressed about her symptoms and was tearful for much of today's visit.    Constipation/irregular bowel movements/bloating  Patient reports history of ongoing constipation for over 20 years after having her first child.  She has tried multiple interventions including a bowel cleanout, MiraLAX, senna, milk of magnesium, Linzess, and Amitiza for which she was on when she first came to [facility] and  GI clinic.  Patient also takes creon.  Patient has done a course of rifaximin in the past with with improvement in bloating and stool consistency.  Patient has also been on specific SIBO/FODMAP diets to the [facility] while avoiding certain food triggers.  Previous pattern would be bowel movements with certain days where she would clear out with multiple bowel  movements in 1 day.    Today:  Given concern for continued underlying constipation contributing to symptoms, we plan for patient to try Motegrity.  This is not covered by her insurance.  We then did a trial of Trulance.  Patient reports while she was on Trulance, she had uncontrollable diarrhea.  She states that this significantly impacted her mental health, and she questioned her desire to live.  She is now off of the Trulance and back on Amitiza with 2 tablets once a day, and 3 tablets once a day.  She was also taking max dose of magnesium.  She states stool consistency is variable with skipping up to 5 days without a bowel movement, then emptying on other days.  Her most bothersome symptom today is severe bloating    GERD, Dysphagia   Summarized/taken from prior documentation   Patient experiences GERD as substernal and throat burning with nocturnal relaxant causing choking. She had previously followed with Dr. George Flores in out clinic and reports a prior Schatzki's ring requiring previous dilation. Manometry was noral, and pH impedence had a DeMeester of 24 with positive symptoms association. Patient has treated GERD symptoms with BID PPI, Carafate, H2 blocker, and tums. At her last visit, she had been experiencing heartburn and has restarted omeprazole 40 mg daily with continued symptoms with specific food trigger.  Had also previously been on Zantac up to 300 mg a day.    Today: Taking famotidine 20 mg twice daily, Prilosec 40 mg twice daily    .    PROBLEM LIST  Patient Active Problem List    Diagnosis Date Noted     Iron deficiency anemia 03/22/2019     Priority: Medium     Headache, chronic migraine without aura 09/18/2018     Priority: Medium     Chronic pain syndrome 09/18/2018     Priority: Medium     S/P hernia repair 08/23/2018     Priority: Medium     Incisional hernia, without obstruction or gangrene 05/20/2018     Priority: Medium     Adhesive capsulitis of shoulder, unspecified laterality  11/14/2017     Priority: Medium     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  Document previously scanned on 1/12/17.  Validation form completed and scanned.  Code Status reflects choices in most recent ACP document..  Confirmed/documented designated decision maker(s).  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 Provider to review and confirm.       GERD (gastroesophageal reflux disease) 12/01/2010     Priority: Medium     Lumbago 04/18/2005     Priority: Medium     History of L5-S1 degenerative disk disease.         Sensorineural hearing loss 01/10/2005     Priority: Medium     Problem list name updated by automated process. Provider to review       Vertiginous syndrome and labyrinthine disorder 01/10/2005     Priority: Medium     Problem list name updated by automated process. Provider to review  IMO Regulatory Load OCT 2020       Sprain and strain of other specified sites of hip and thigh 12/09/2002     Priority: Medium     Chondromalacia of patella 12/09/2002     Priority: Medium     Need for prophylactic immunotherapy      Priority: Medium     trees, grass, srw, dust mites, cat       Allergic rhinitis due to other allergen 12/21/2001     Priority: Medium     Hypothyroidism      Priority: Medium     Problem list name updated by automated process.  Provider to review         PERTINENT PAST MEDICAL HISTORY:  Past Medical History:   Diagnosis Date     Allergic rhinitis, cause unspecified      Allergy to other foods     strawberries, apples, celeries, alice, watermelon     Arthritis     left knee     Choledocholithiasis     long after cholecystectomy     Chronic abdominal pain      Chronic constipation      Chronic nausea      Chronic pancreatitis (H)      Degeneration of lumbar or lumbosacral intervertebral disc      Esophageal reflux     w/ hiatal hernia     Gastroparesis      Hiatal hernia      History of pituitary adenoma     s/p resection     Hypothyroidism      Migraines      Mild hyperlipidemia      On tube feeding diet     presence of GJ tube     Pancreatic disease     PD stricture, 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 endometriosis growth     APPENDECTOMY       C  DELIVERY ONLY       C  DELIVERY ONLY      repeat c section with incidental cystotomy with repair     C EXCIS PITUITARY,TRANSNASAL/SEPTAL      pituitary tumor removed for diabetes insipidus     C TOTAL ABDOM HYSTERECTOMY      w/ bilateral salpingoophorectomy       SECTION       COLONOSCOPY       ENDOSCOPIC RETROGRADE CHOLANGIOPANCREATOGRAM N/A 2015    Procedure: ENDOSCOPIC RETROGRADE CHOLANGIOPANCREATOGRAM;  Surgeon: Mandeep Park MD;  [facility]: [facility]     ENDOSCOPIC RETROGRADE CHOLANGIOPANCREATOGRAM N/A 2016    Procedure: COMBINED ENDOSCOPIC RETROGRADE CHOLANGIOPANCREATOGRAPHY, PLACE TUBE/STENT;  Surgeon: Mandeep Park MD;  [facility]     ENDOSCOPIC RETROGRADE CHOLANGIOPANCREATOGRAM N/A 3/17/2016    Procedure: COMBINED ENDOSCOPIC RETROGRADE  CHOLANGIOPANCREATOGRAPHY, REMOVE FOREIGN BODY OR STENT/TUBE;  Surgeon: Mandeep Park MD;  Location: [facility]     ENDOSCOPIC RETROGRADE CHOLANGIOPANCREATOGRAM N/A 8/2/2016    Procedure: COMBINED ENDOSCOPIC RETROGRADE CHOLANGIOPANCREATOGRAPHY, PLACE TUBE/STENT;  Surgeon: Mandeep Park MD;  [facility]: [facility]     ENDOSCOPIC RETROGRADE CHOLANGIOPANCREATOGRAM N/A 8/26/2016    Procedure: COMBINED ENDOSCOPIC RETROGRADE CHOLANGIOPANCREATOGRAPHY, REMOVE FOREIGN BODY OR STENT/TUBE;  Surgeon: Mandeep Park MD;  [facility]     ENDOSCOPIC ULTRASOUND UPPER GASTROINTESTINAL TRACT (GI) N/A 10/3/2016    Procedure: ENDOSCOPIC ULTRASOUND, ESOPHAGOSCOPY / UPPER GASTROINTESTINAL TRACT (GI);  Surgeon: Guru Jose Rodas MD;  Location: [facility]     ESOPHAGOSCOPY, GASTROSCOPY, DUODENOSCOPY (EGD), COMBINED N/A 6/24/2015    Procedure: COMBINED ESOPHAGOSCOPY, GASTROSCOPY, DUODENOSCOPY (EGD), REMOVE FOREIGN BODY;  Surgeon: Mandeep Park MD;  [facility]     ESOPHAGOSCOPY, GASTROSCOPY, DUODENOSCOPY (EGD), COMBINED N/A 10/25/2015    Procedure: COMBINED ESOPHAGOSCOPY, GASTROSCOPY, DUODENOSCOPY (EGD);  Surgeon: Sammy Amaro MD;  [facility]     ESOPHAGOSCOPY, GASTROSCOPY, DUODENOSCOPY (EGD), COMBINED N/A 10/25/2015    Procedure: COMBINED ESOPHAGOSCOPY, GASTROSCOPY, DUODENOSCOPY (EGD), BIOPSY SINGLE OR MULTIPLE;  Surgeon: Sammy Amaro MD;  Location: [facility]     ESOPHAGOSCOPY, GASTROSCOPY, DUODENOSCOPY (EGD), DILATATION, COMBINED       EXCISE LESION TRUNK N/A 4/17/2017    Procedure: EXCISE LESION TRUNK;  Removal of Abdominal Foreign Body;  Surgeon: Nestor Phoenix MD;  [facility]     HC ESOPH/GAS REFLUX TEST W NASAL IMPED >1 HR N/A 11/19/2015    Procedure: ESOPHAGEAL IMPEDENCE FUNCTION TEST WITH 24 HOUR PH GREATER THAN 1 HOUR;  Surgeon: Thiago Apple MD;  Location: [facility]     HC [facility] ENDOSCOPY DIAG W BIOPSY  9/17/08     HC UGI ENDOSCOPY DIAG W BIOPSY  9/27/12     HC  [facility] ENDOSCOPY W ESOPHAGEAL DILATION BALLOON <30MM  9/17/08     HC UGI ENDOSCOPY W EUS N/A 5/5/2015    Procedure: COMBINED ENDOSCOPIC ULTRASOUND, ESOPHAGOSCOPY, GASTROSCOPY, DUODENOSCOPY (EGD);  Surgeon: Wm Dueñas MD;  Location: [facility]     HC WRIST ARTHROSCOP,RELEASE XVERS LIG Bilateral 12/17/08     INJECT TRANSVERSUS ABDOMINIS PLANE (TAP) BLOCK BILATERAL Left 9/22/2016    Procedure: INJECT TRANSVERSUS ABDOMINIS PLANE (TAP) BLOCK BILATERAL;  Surgeon: Dickson Corrigan MD;  [facility]     laparoscopic pineda  1995     LAPAROSCOPIC HERNIORRHAPHY INCISIONAL N/A 8/23/2018    Procedure: LAPAROSCOPIC HERNIORRHAPHY INCISIONAL;  Laparoscopic Incisional Hernia Repair with Symbotex Mesh Implant;  Surgeon: Nestor Phoenix MD;  [facility]     LAPAROSCOPIC PANCREATECTOMY, TRANSPLANT AUTO ISLET CELL N/A 12/28/2016    Procedure: LAPAROSCOPIC PANCREATECTOMY, TRANSPLANT AUTO ISLET CELL;  Surgeon: Nestor Phoenix MD;  Location: [facility]     transphenoidal pituitary resection  1990       ALLERGIES:  Allergies   Allergen Reactions     Apple Anaphylaxis     Corticosteroids Other (See Comments)     All oral, IV and injectable steroids are contraindicated (unless in life threatening situations) in [facility] transplant recipients. They can cause irreversible loss of islet cell function. Please contact patient's transplant care coordinator ADI Gaffney RN at [phone]/pager [phone] and/or endocrinologist prior to administration.       Depakote [Divalproex Sodium] Other (See Comments)     Chest pain     Zithromax [Azithromycin Dihydrate] Anaphylaxis     Noted in 4/7/08 ER     Darvocet [Propoxyphene N-Apap] Itching     Morphine Nausea and Vomiting and Rash     Nalbuphine Hcl Rash     RASH :nubaine     Zosyn [Piperacillin-Tazobactam In D5w] Rash     Possible allergy, did have a diffuse rash that seemed drug related but could have also been related to soap in the hospital.      Bactrim [Sulfamethoxazole W-Trimethoprim]  Other (See Comments) and Nausea and Vomiting     Severely low liver function.     Cats      Compazine [Prochlorperazine] Other (See Comments)     Twitching. Takes Benedryl and is fine     Dust Mites      Grass      Prednisone Other (See Comments)     Insomnia       Ragweeds      Tape [Adhesive Tape] Blisters     Tramadol      Trees      Zofran [Ondansetron] Other (See Comments)     migraine     Flagyl [Metronidazole] Hives and Rash       PERTINENT MEDICATIONS:    Current Outpatient Medications:      acarbose (PRECOSE) 50 MG tablet, Start with 1 pill (50 mg) per day with each meal but can go up to 2 pills (100 mg) per day if needed., Disp: 180 tablet, Rfl: 3     acetaminophen 500 MG CAPS, Take 2 mg by mouth 2 times daily, Disp: , Rfl:      amitriptyline (ELAVIL) 10 MG tablet, Take 5 tablets (50 mg) by mouth At Bedtime Take 40 mg at bedtime, Disp: 150 tablet, Rfl: 11     amylase-lipase-protease (CREON 24) 29004-02208 units CPEP per EC capsule, Take 3-4 capsules by mouth with meals and 1-2 with snacks. Maximum 15 capsules per day., Disp: 450 capsule, Rfl: 11     aspirin 81 MG EC tablet, Take 1 tablet (81 mg) by mouth daily, Disp: 90 tablet, Rfl: 3     azelastine (ASTELIN) 0.1 % nasal spray, Spray 1 spray into both nostrils 2 times daily, Disp: 1 Bottle, Rfl: 11     blood glucose (PAT CONTOUR NEXT) test strip, Use to test blood sugar 6 times daily or as directed., Disp: 2 Box, Rfl: 11     blood glucose monitoring (PAT MICROLET) lancets, Use to test blood sugar 6-8 times daily or as directed., Disp: 100 each, Rfl: 11     calcium carbonate-vitamin D (CALCIUM 500/D) 500-200 MG-UNIT tablet, , Disp: , Rfl:      canagliflozin (INVOKANA) 100 MG tablet, Take 1 tablet (100 mg) by mouth every morning (before breakfast), Disp: 30 tablet, Rfl: 11     cetirizine (ZYRTEC) 10 MG tablet, Take 1 tablet (10 mg) by mouth daily, Disp: 90 tablet, Rfl: 3     Continuous Blood Gluc Sensor (FREESTYLE LISA 2 SENSOR) MIS, 1 each every 14  days, Disp: 2 each, Rfl: 11     Continuous Blood Gluc Sensor (FREESTYLE LISA 2 SENSOR) MISC, 1 each every 14 days, Disp: 2 each, Rfl: 11     diphenhydrAMINE (BENADRYL ALLERGY) 25 MG capsule, Take 1 capsule (25 mg) by mouth every 6 hours as needed for itching or allergies, Disp: 56 capsule, Rfl: 0     docusate sodium (COLACE) 100 MG capsule, Take 1 capsule (100 mg) by mouth 2 times daily as needed for constipation,, Disp: 60 capsule, Rfl: 0     estradiol (VAGIFEM) 10 MCG TABS vaginal tablet, Place 1 tablet (10 mcg) vaginally twice a week, Disp: 24 tablet, Rfl: 3     famotidine (PEPCID) 20 MG tablet, Take 1 tablet (20 mg) by mouth 2 times daily, Disp: 180 tablet, Rfl: 3     fish oil-omega-3 fatty acids 1000 MG capsule, , Disp: , Rfl:      FOLIC ACID PO, Take 1 mg by mouth daily, Disp: , Rfl:      glucose 40 % GEL gel, Take 15-30 g by mouth every 15 minutes as needed for low blood sugar, Disp: 3 Tube, Rfl: 2     ibuprofen (ADVIL/MOTRIN) 400 MG tablet, Take 1 tablet (400 mg) by mouth every 6 hours as needed for moderate pain, Disp: 30 tablet, Rfl: 0     levothyroxine (SYNTHROID) 137 MCG tablet, Take 1 tablet (137 mcg) by mouth daily, Disp: 90 tablet, Rfl: 3     lubiprostone (AMITIZA) 8 MCG capsule, Take 3 capsules in AM and 2 capsules in PM  More refills after virtual visit with Ms Marti, Disp: 150 capsule, Rfl: 11     magnesium oxide 200 MG TABS, 1,000 mg , Disp: , Rfl:      montelukast (SINGULAIR) 10 MG tablet, TAKE 1 TABLET(10 MG) BY MOUTH AT BEDTIME, Disp: 90 tablet, Rfl: 3     multivitamin CF formula (CHOICEFUL) capsule, Take 1 tablet by mouth daily, Disp: , Rfl: 11     Nutritional Supplements (BOOST HIGH PROTEIN) LIQD, After above baseline labs are drawn, give: 6 mL/kg to maximum of 360 mL; the beverage is to be consumed within 5 minutes., Disp: , Rfl: 0     omeprazole (PRILOSEC) 40 MG DR capsule, Take 1 capsule (40 mg) by mouth 2 times daily, Disp: 180 capsule, Rfl: 3     order for DME, Equipment being  ordered:Cefaly, Disp: 1 Device, Rfl: 0     prochlorperazine (COMPAZINE) 5 MG tablet, Take two 5 mg tablets by mouth every six hours as needed for nausea., Disp: 90 tablet, Rfl: 3     promethazine (PHENERGAN) 25 MG tablet, Take 1 tablet (25 mg) by mouth At Bedtime, Disp: 90 tablet, Rfl: 3     Prucalopride Succinate (MOTEGRITY) 2 MG TABS, Take 2 mg by mouth daily, Disp: 30 tablet, Rfl: 11     senna-docusate (SENOKOT-S;PERICOLACE) 8.6-50 MG per tablet, Take 1-2 tablets by mouth 2 times daily, Disp: 100 tablet, Rfl: 0     Sharps Container (BD SHARPS ) MISC, 1 Container as needed, Disp: 1 each, Rfl: 1     ZOLMitriptan (ZOMIG) 5 MG tablet, Take 1 tablet (5 mg) by mouth at onset of headache for migraine, Disp: 9 tablet, Rfl: 3    SOCIAL HISTORY:  Social History     Socioeconomic History     Marital status:      Spouse name: Norris     Number of children: 3     Years of education: 16     Highest education level: Not on file   Occupational History     Occupation: director     Employer: [employer]   Social Needs     Financial resource strain: Not on file     Food insecurity     Worry: Not on file     Inability: Not on file     Transportation needs     Medical: Not on file     Non-medical: Not on file   Tobacco Use     Smoking status: Former Smoker     Packs/day: 0.50     Years: 6.00     Pack years: 3.00     Types: Cigarettes     Start date: 1985     Quit date: 1992     Years since quittin.4     Smokeless tobacco: Never Used     Tobacco comment: no 2nd hand   Substance and Sexual Activity     Alcohol use: Yes     Alcohol/week: 3.0 - 6.0 standard drinks     Types: 1 - 2 Glasses of wine, 1 - 2 Cans of beer, 1 - 2 Shots of liquor per week     Comment: occasional     Drug use: No     Sexual activity: Yes     Partners: Male     Birth control/protection: None     Comment: Hystectomy 1999   Lifestyle     Physical activity     Days per week: Not on file     Minutes per session: Not on file     Stress: Not on  file   Relationships     Social connections     Talks on phone: Not on file     Gets together: Not on file     Attends Caoda[employer] service: Not on file     Active member of club or organization: Not on file     Attends meetings of clubs or organizations: Not on file     Relationship status: Not on file     Intimate partner violence     Fear of current or ex partner: Not on file     Emotionally abused: Not on file     Physically abused: Not on file     Forced sexual activity: Not on file   Other Topics Concern     Parent/sibling w/ CABG, MI or angioplasty before 65F 55M? No      Service Not Asked     Blood Transfusions No     Caffeine Concern Not Asked     Occupational Exposure Not Asked     Hobby Hazards Not Asked     Sleep Concern Not Asked     Stress Concern Not Asked     Weight Concern Not Asked     Special Diet Not Asked     Back Care Not Asked     Exercise Not Asked     Bike Helmet Not Asked     Seat Belt Yes     Self-Exams Not Asked   Social History Narrative     with 3 children and a dog.  No smoking, etoh or drug use.  Worked as a  for [employer] in [employer] in the past.  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       FAMILY HISTORY:  Family History   Problem Relation Age of Onset     Eye Disorder Father         cataract, detached retina     Myocardial Infarction Father 60     Lipids Father      Cerebrovascular Disease Father      Depression Father      Substance Abuse Father      Anesthesia Reaction Father         stroke right after surgery     Cataracts Father      Osteoarthritis Father      Ulcerative Colitis Father      Lipids Mother      Hypertension Mother      Thyroid Disease Mother      Depression Mother      Angina Mother      GERD Mother      Skin Cancer Mother      Eye Disorder Son         ptosis     Eye Disorder Paternal Grandmother         cataract     Eye Disorder Paternal Grandfather         cataract     Diabetes Paternal  Grandfather      Eye Disorder Maternal Grandmother         cataract     Thyroid Disease Maternal Grandmother      Coronary Artery Disease Sister         angioplasty     GERD Sister      Substance Abuse Sister      Depression Sister      Depression Son      Anxiety Disorder Son      Thyroid Disease Sister      Diabetes Maternal Grandfather      Depression Nephew      Anxiety Disorder Nephew      Thyroid Disease Nephew      Diabetes Type 2  Cousin         paternal cousin     Heart Disease Paternal Aunt      Diabetes Paternal Aunt      Diabetes Paternal Uncle      Heart Disease Paternal Uncle      Past/family/social history reviewed and no changes    PHYSICAL EXAMINATION:  General appearance: Healthy appearing adult,  Eyes: Sclera anicteric, Pupils round and reactive to light  Ears, nose, mouth and throat: No obvious external lesions of ears and nose, Hearing intact  Neck: Symmetric, No obvious external lesions  Respiratory: Normal respiration, no use of accessory muscles   Skin: No rashes or jaundice   Psychiatric: Oriented to person, place and time,  very tearful during today's visit     PERTINENT STUDIES:  Orders Only on 09/13/2018   Component Date Value Ref Range Status     WBC 09/13/2018 5.8  4.0 - 11.0 10e9/L Final     RBC Count 09/13/2018 4.51  3.8 - 5.2 10e12/L Final     Hemoglobin 09/13/2018 14.1  11.7 - 15.7 g/dL Final     Hematocrit 09/13/2018 42.1  35.0 - 47.0 % Final     MCV 09/13/2018 93  78 - 100 fl Final     MCH 09/13/2018 31.3  26.5 - 33.0 pg Final     MCHC 09/13/2018 33.5  31.5 - 36.5 g/dL Final     RDW 09/13/2018 13.2  10.0 - 15.0 % Final     Platelet Count 09/13/2018 430  150 - 450 10e9/L Final     Diff Method 09/13/2018 Automated Method   Final     % Neutrophils 09/13/2018 37.1  % Final     % Lymphocytes 09/13/2018 40.5  % Final     % Monocytes 09/13/2018 10.5  % Final     % Eosinophils 09/13/2018 9.8  % Final     % Basophils 09/13/2018 2.1  % Final     % Immature Granulocytes 09/13/2018 0.0  %  Final     Nucleated RBCs 09/13/2018 0  0 /100 Final     Absolute Neutrophil 09/13/2018 2.2  1.6 - 8.3 10e9/L Final     Absolute Lymphocytes 09/13/2018 2.4  0.8 - 5.3 10e9/L Final     Absolute Monocytes 09/13/2018 0.6  0.0 - 1.3 10e9/L Final     Absolute Eosinophils 09/13/2018 0.6  0.0 - 0.7 10e9/L Final     Absolute Basophils 09/13/2018 0.1  0.0 - 0.2 10e9/L Final     Abs Immature Granulocytes 09/13/2018 0.0  0 - 0.4 10e9/L Final     Absolute Nucleated RBC 09/13/2018 0.0   Final     Sodium 09/13/2018 138  133 - 144 mmol/L Final     Potassium 09/13/2018 4.3  3.4 - 5.3 mmol/L Final     Chloride 09/13/2018 102  94 - 109 mmol/L Final     Carbon Dioxide 09/13/2018 30  20 - 32 mmol/L Final     Anion Gap 09/13/2018 5  3 - 14 mmol/L Final     Glucose 09/13/2018 119* 70 - 99 mg/dL Final     Urea Nitrogen 09/13/2018 12  7 - 30 mg/dL Final     Creatinine 09/13/2018 0.83  0.52 - 1.04 mg/dL Final     GFR Estimate 09/13/2018 72  >60 mL/min/1.7m2 Final     GFR Estimate If Black 09/13/2018 87  >60 mL/min/1.7m2 Final     Calcium 09/13/2018 9.2  8.5 - 10.1 mg/dL Final     Bilirubin Direct 09/13/2018 0.2  0.0 - 0.2 mg/dL Final     Bilirubin Total 09/13/2018 0.8  0.2 - 1.3 mg/dL Final     Albumin 09/13/2018 3.9  3.4 - 5.0 g/dL Final     Protein Total 09/13/2018 7.8  6.8 - 8.8 g/dL Final     Alkaline Phosphatase 09/13/2018 145  40 - 150 U/L Final     ALT 09/13/2018 59* 0 - 50 U/L Final     AST 09/13/2018 39  0 - 45 U/L Final     IGG 09/13/2018 1160  695 - 1620 mg/dL Final     IgG1 09/13/2018 416  300 - 856 mg/dL Final     IgG2 09/13/2018 384  158 - 761 mg/dL Final     IgG3 09/13/2018 83  24 - 192 mg/dL Final     IgG4 09/13/2018 95* 11 - 86 mg/dL Final

## 2021-06-04 NOTE — LETTER
"    6/4/2021         RE: Dinora Mcghee  [address] [facility] [address]        Dear Colleague,    Thank you for referring your patient, Dinora Mcghee, to the [facility] [place]. Please see a copy of my visit note below.    Dinora Mcghee is a 56 year old female who is being evaluated via a billable video visit.      The patient has been notified of following:     \"This video visit will be conducted via a call between you and your physician/provider. We have found that certain health care needs can be provided without the need for an in-person physical exam.  This service lets us provide the care you need with a video conversation.  If a prescription is necessary we can send it directly to your pharmacy.  If lab work is needed we can place an order for that and you can then stop by our lab to have the test done at a later time.    If during the course of the call the physician/provider feels a video visit is not appropriate, you will not be charged for this service.\"     Patient confirmed that they are in [place] for today's visit yes.    Video-Visit Details  Type of service:  Video Visit    Video Start Time: 10:09 PM  Video End Time:  11:01 PM    Originating Location (pt. 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     1.  Abdominal pain, " nausea/vomiting, constipation  Patient reports worsening abdominal pain, nausea and vomiting starting in November 2020.  Of note, had been working on a more vegan diet around the time.  Was evaluated in the emergency department with unremarkable CT scan and lab work-up.  Then had small bowel follow-through without evidence of stricture or obstruction.  Patient met with our GI dietitian to discuss the gastroparesis diet which initially did seem to make a big improvement in her symptoms, though they did not return to baseline.  She finds the gastroparesis diet very difficult in the setting of diabetes, and living to cook.  We again discussed medical options for gastroparesis today, though we are limited as she has reactions with Reglan.  Could also consider BuSpar for gastric accommodation, low concern would be that this could interact with her Imitrex.  I would like her to meet with our pharmacy team to discuss her medications in depth to see if her medications are contributing to symptoms, also to discuss safety profile of BuSpar for functional dyspepsia/gastroparesis.    Patient also reports significant debilitating bloating shortly after eating.  This may be due to underlying motility issues, functional bloating, small intestinal bacterial overgrowth, irritable bowel syndrome.  She has benefited from course of rifaximin in the past.  Would recommend retreatment at this time.    In regards to her constipation, she completed a 1 week trial of Trulance which significantly worsened her symptoms.  She is now on Amitiza though her bowel movements have not returned to baseline.  Discussed that it may be beneficial to complete a bowel cleanout and then resume daily bowel meds.  Given likely motility issue contributing to both constipation and upper GI symptoms, it would be beneficial for her to be on Motegrity however this has been difficult to obtain due to to her insurance.  Will also discussed with our pharmacy  team.    Patient was very tearful during today's visit, and noted while she was on the Trulance medication she was not sure she wanted to live.  I offered her a visit with a psychiatrist which she declined at this time.  We again discussed the significant mind gut connection, and how GI symptoms are impacted by mental health.  She is very discouraged about trying future GI medications, but we discussed the importance of giving them a fair chance.  I did offer her a visit with our GI health psychologist which the patient agrees to, I messaged Dr. Sanchez to facilitate scheduling of this.  -- Golytely prep: fill the container to the 4 liter fill line, shake vigorously several times, and drink a cup every 10 minutes until you drink the entire container. You do not have to finish the entire bottle, but the more you drink the more likely you will be emptied. Do this on the weekend when you can be near a bathroom.   -- rifaximin 550 mg three times a day for 14 days. If this is too expensive, we can always use a different antibiotic. This has significantly helped you in the past!   --Continue Amitiza and max dose magnesium oxide  --I messaged our pharmacy team to see other options of getting motegrity   --Continue good water intake, exercise  --Follow dietary recommendations as you have discussed with Quyen. It is okay to go to a more liquid diet if symptoms worsen.  Continue to monitor your weight  -- meet with our pharmacy team to discuss the following:    -1: are your current medications contributing to symptoms? Can we adjust them?   -2: Would buspar be safe to take with your other migraine medications?   -- meet with Dr. Sanchez to discuss the mind-gut connection. If you would like a referral to see psychiatry, please let me know   -- I have spoken to our care coordinator Melyssa- next visit will be with Dr. Genao. Please call if you have not heard about an appointment     RTC 2 months     80 Minutes was spent on the  date of the encounter during chart review, history and exam, documentation, and further activities as noted     Note completed using voice recognition software. Some word and grammatical errors may occur.    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     History summarized from previous documentation from Dr. Genao. Please see previous notes for further details     Gastroparesis  Gastric emptying study with 2-hour study at [facility] have been consistent with gastroparesis, patient had follow-up study here 6/27/2016 with 49% remaining at 4 hours however the patient was not aware she was on narcotics at the time.  Had GJ tubes in fall 2016, NG tube used for venting with tube feeds and was able to wean off of tube feeds after TPAIT.  At initial visit she reported severe migraine headaches associated with vomiting and is on amitriptyline for migraine prophylaxis.  Patient has used several medication for the symptoms including scopolamine patch, prochlorperazine, promethazine which helped symptoms.  She has also been seen by neurology and psychology due to concern that she has a difficult time sleeping and with migraines.  At previous visits, reported nausea with occasional episodes of vomiting.  Her regimen was previously well managed with Compazine in the morning, Phenergan at night.  She did note that nausea and vomiting worsened with migraines.    Patient reports that she had been feeling pretty well up until early  November.  Early November the patient noted worsening symptoms.  He was seen at the [facility] emergency department on November 18.  At that time, blood work notable for normal BMP, low lipase, unremarkable hepatic panel.  CBC without elevation of WBCs though she did have elevated platelets, neutrophils and lymphocytes.  CT at that time with moderate stool burden, nonspecific fluid in small intestine.  No evidence of obstruction per documentation. Had similar episodes after this. This had improved with juicing foods.  States that she has 6-7 small meals a day with about a cup of food at each meal.  States that foods such as toast, bread, gluten seem to go better than healthier foods.  Is having a difficult time balancing foods that she enjoys eating, foods that cause symptoms and foods that are healthy.  Will have nausea vomiting and pain with food triggers.  Of note, patient reported increased stress in the setting of a son who is struggling with his own mental health.  At her visits this spring, she reported that symptoms did seem to be improved with strict dietary measures.     Today: She reports that most bothersome symptom is bloating shortly after eating, feeling full.  She is worried she is not getting adequate nutrients.  She does not want to have a feeding tube again.  The gastroparesis diet/liquid diet is very difficult to balance her blood sugars, and she misses eating foods that he likes to cook.  Patient is extremely distressed about her symptoms and was tearful for much of today's visit.    Constipation/irregular bowel movements/bloating  Patient reports history of ongoing constipation for over 20 years after having her first child.  She has tried multiple interventions including a bowel cleanout, MiraLAX, senna, milk of magnesium, Linzess, and Amitiza for which she was on when she first came to [facility] and  GI clinic.  Patient also takes creon.  Patient has done a course of rifaximin in the  past with with improvement in bloating and stool consistency.  Patient has also been on specific SIBO/FODMAP diets to the [facility] while avoiding certain food triggers.  Previous pattern would be bowel movements with certain days where she would clear out with multiple bowel movements in 1 day.    Today:  Given concern for continued underlying constipation contributing to symptoms, we plan for patient to try Motegrity.  This is not covered by her insurance.  We then did a trial of Trulance.  Patient reports while she was on Trulance, she had uncontrollable diarrhea.  She states that this significantly impacted her mental health, and she questioned her desire to live.  She is now off of the Trulance and back on Amitiza with 2 tablets once a day, and 3 tablets once a day.  She was also taking max dose of magnesium.  She states stool consistency is variable with skipping up to 5 days without a bowel movement, then emptying on other days.  Her most bothersome symptom today is severe bloating    GERD, Dysphagia   Summarized/taken from prior documentation   Patient experiences GERD as substernal and throat burning with nocturnal relaxant causing choking. She had previously followed with Dr. George Flores in out clinic and reports a prior Schatzki's ring requiring previous dilation. Manometry was noral, and pH impedence had a DeMeester of 24 with positive symptoms association. Patient has treated GERD symptoms with BID PPI, Carafate, H2 blocker, and tums. At her last visit, she had been experiencing heartburn and has restarted omeprazole 40 mg daily with continued symptoms with specific food trigger.  Had also previously been on Zantac up to 300 mg a day.    Today: Taking famotidine 20 mg twice daily, Prilosec 40 mg twice daily    .    PROBLEM LIST  Patient Active Problem List    Diagnosis Date Noted     Iron deficiency anemia 03/22/2019     Priority: Medium     Headache, chronic migraine without aura 09/18/2018      Priority: Medium     Chronic pain syndrome 09/18/2018     Priority: Medium     S/P hernia repair 08/23/2018     Priority: Medium     Incisional hernia, without obstruction or gangrene 05/20/2018     Priority: Medium     Adhesive capsulitis of shoulder, unspecified laterality 11/14/2017     Priority: Medium     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  Document previously scanned on 1/12/17.  Validation form completed and scanned.  Code Status reflects choices in most recent ACP document..  Confirmed/documented designated decision maker(s).  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 Provider to review and confirm.       GERD (gastroesophageal reflux disease) 12/01/2010     Priority: Medium     Lumbago 04/18/2005     Priority: Medium     History of L5-S1 degenerative disk disease.         Sensorineural hearing loss 01/10/2005     Priority: Medium     Problem list name updated by automated process. Provider to review       Vertiginous syndrome and labyrinthine disorder 01/10/2005     Priority: Medium     Problem list name updated by automated process. Provider to review  IMO Regulatory Load OCT 2020       Sprain and strain of other specified sites of hip and thigh 12/09/2002     Priority: Medium     Chondromalacia of patella 12/09/2002     Priority:  Medium     Need for prophylactic immunotherapy      Priority: Medium     trees, grass, srw, dust mites, cat       Allergic rhinitis due to other allergen 2001     Priority: Medium     Hypothyroidism      Priority: Medium     Problem list name updated by automated process. Provider to review         PERTINENT PAST MEDICAL HISTORY:  Past Medical History:   Diagnosis Date     Allergic rhinitis, cause unspecified      Allergy to other foods     strawberries, apples, celeries, alice, watermelon     Arthritis     left knee     Choledocholithiasis     long after cholecystectomy     Chronic abdominal pain      Chronic constipation      Chronic nausea      Chronic pancreatitis (H)      Degeneration of lumbar or lumbosacral intervertebral disc      Esophageal reflux     w/ hiatal hernia     Gastroparesis      Hiatal hernia      History of pituitary adenoma     s/p resection     Hypothyroidism      Migraines      Mild hyperlipidemia      On tube feeding diet     presence of GJ tube     Pancreatic disease     PD stricture, 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 endometriosis growth     APPENDECTOMY       C  DELIVERY ONLY       C  DELIVERY ONLY      repeat c section with incidental cystotomy with repair     C EXCIS PITUITARY,TRANSNASAL/SEPTAL      pituitary tumor removed for diabetes insipidus     C TOTAL ABDOM HYSTERECTOMY      w/ bilateral salpingoophorectomy       SECTION       COLONOSCOPY       ENDOSCOPIC RETROGRADE CHOLANGIOPANCREATOGRAM N/A 2015    Procedure: ENDOSCOPIC RETROGRADE CHOLANGIOPANCREATOGRAM;  Surgeon: Mandeep Park MD;  Location: [facility]     ENDOSCOPIC RETROGRADE  CHOLANGIOPANCREATOGRAM N/A 2/9/2016    Procedure: COMBINED ENDOSCOPIC RETROGRADE CHOLANGIOPANCREATOGRAPHY, PLACE TUBE/STENT;  Surgeon: Mandeep Park MD;  Location: [facility]     ENDOSCOPIC RETROGRADE CHOLANGIOPANCREATOGRAM N/A 3/17/2016    Procedure: COMBINED ENDOSCOPIC RETROGRADE CHOLANGIOPANCREATOGRAPHY, REMOVE FOREIGN BODY OR STENT/TUBE;  Surgeon: Mandeep Park MD;  Location: [facility]     ENDOSCOPIC RETROGRADE CHOLANGIOPANCREATOGRAM N/A 8/2/2016    Procedure: COMBINED ENDOSCOPIC RETROGRADE CHOLANGIOPANCREATOGRAPHY, PLACE TUBE/STENT;  Surgeon: Mandeep Park MD;  Location: [facility]     ENDOSCOPIC RETROGRADE CHOLANGIOPANCREATOGRAM N/A 8/26/2016    Procedure: COMBINED ENDOSCOPIC RETROGRADE CHOLANGIOPANCREATOGRAPHY, REMOVE FOREIGN BODY OR STENT/TUBE;  Surgeon: Mandeep Park MD;  Location: [facility]     ENDOSCOPIC ULTRASOUND UPPER GASTROINTESTINAL TRACT (GI) N/A 10/3/2016    Procedure: ENDOSCOPIC ULTRASOUND, ESOPHAGOSCOPY / UPPER GASTROINTESTINAL TRACT (GI);  Surgeon: Guru Jose Rodas MD;  Location: [facility]     ESOPHAGOSCOPY, GASTROSCOPY, DUODENOSCOPY (EGD), COMBINED N/A 6/24/2015    Procedure: COMBINED ESOPHAGOSCOPY, GASTROSCOPY, DUODENOSCOPY (EGD), REMOVE FOREIGN BODY;  Surgeon: Mandeep Park MD;  Location: [facility]     ESOPHAGOSCOPY, GASTROSCOPY, DUODENOSCOPY (EGD), COMBINED N/A 10/25/2015    Procedure: COMBINED ESOPHAGOSCOPY, GASTROSCOPY, DUODENOSCOPY (EGD);  Surgeon: Sammy Amaro MD;  Location: [facility]     ESOPHAGOSCOPY, GASTROSCOPY, DUODENOSCOPY (EGD), COMBINED N/A 10/25/2015    Procedure: COMBINED ESOPHAGOSCOPY, GASTROSCOPY, DUODENOSCOPY (EGD), BIOPSY SINGLE OR MULTIPLE;  Surgeon: Sammy Amaro MD;  Location: [facility]     ESOPHAGOSCOPY, GASTROSCOPY, DUODENOSCOPY (EGD), DILATATION, COMBINED       EXCISE LESION TRUNK N/A 4/17/2017    Procedure: EXCISE LESION TRUNK;  Removal of Abdominal Foreign Body;  Surgeon: Nestor Phoenix MD;  [facility]       ESOPH/GAS REFLUX TEST W NASAL IMPED >1 HR N/A 11/19/2015    Procedure: ESOPHAGEAL IMPEDENCE FUNCTION TEST WITH 24 HOUR PH GREATER THAN 1 HOUR;  Surgeon: Thiago Apple MD;  Location: [facility] GI     HC UGI ENDOSCOPY DIAG W BIOPSY  9/17/08     HC UGI ENDOSCOPY DIAG W BIOPSY  9/27/12     HC UGI ENDOSCOPY W ESOPHAGEAL DILATION BALLOON <30MM  9/17/08     HC UGI ENDOSCOPY W EUS N/A 5/5/2015    Procedure: COMBINED ENDOSCOPIC ULTRASOUND, ESOPHAGOSCOPY, GASTROSCOPY, DUODENOSCOPY (EGD);  Surgeon: Wm Dueñas MD;  Location: [facility]     HC WRIST ARTHROSCOP,RELEASE XVERS LIG Bilateral 12/17/08     INJECT TRANSVERSUS ABDOMINIS PLANE (TAP) BLOCK BILATERAL Left 9/22/2016    Procedure: INJECT TRANSVERSUS ABDOMINIS PLANE (TAP) BLOCK BILATERAL;  Surgeon: Dickson Corrigan MD;  [facility]     laparoscopic pineda  1995     LAPAROSCOPIC HERNIORRHAPHY INCISIONAL N/A 8/23/2018    Procedure: LAPAROSCOPIC HERNIORRHAPHY INCISIONAL;  Laparoscopic Incisional Hernia Repair with Symbotex Mesh Implant;  Surgeon: Nestor Phoenix MD;  Location: [facility]     LAPAROSCOPIC PANCREATECTOMY, TRANSPLANT AUTO ISLET CELL N/A 12/28/2016    Procedure: LAPAROSCOPIC PANCREATECTOMY, TRANSPLANT AUTO ISLET CELL;  Surgeon: Nestor Phoenix MD;  Location: [facility]     transphenoidal pituitary resection  1990       ALLERGIES:  Allergies   Allergen Reactions     Apple Anaphylaxis     Corticosteroids Other (See Comments)     All oral, IV and injectable steroids are contraindicated (unless in life threatening situations) in [facility] transplant recipients. They can cause irreversible loss of islet cell function. Please contact patient's transplant care coordinator ADI Gaffney RN at [phone]/pager [phone] and/or endocrinologist prior to administration.       Depakote [Divalproex Sodium] Other (See Comments)     Chest pain     Zithromax [Azithromycin Dihydrate] Anaphylaxis     Noted in 4/7/08 ER     Darvocet [Propoxyphene N-Apap] Itching     Morphine  Nausea and Vomiting and Rash     Nalbuphine Hcl Rash     RASH :nubaine     Zosyn [Piperacillin-Tazobactam In D5w] Rash     Possible allergy, did have a diffuse rash that seemed drug related but could have also been related to soap in the hospital.      Bactrim [Sulfamethoxazole W-Trimethoprim] Other (See Comments) and Nausea and Vomiting     Severely low liver function.     Cats      Compazine [Prochlorperazine] Other (See Comments)     Twitching. Takes Benedryl and is fine     Dust Mites      Grass      Prednisone Other (See Comments)     Insomnia       Ragweeds      Tape [Adhesive Tape] Blisters     Tramadol      Trees      Zofran [Ondansetron] Other (See Comments)     migraine     Flagyl [Metronidazole] Hives and Rash       PERTINENT MEDICATIONS:    Current Outpatient Medications:      acarbose (PRECOSE) 50 MG tablet, Start with 1 pill (50 mg) per day with each meal but can go up to 2 pills (100 mg) per day if needed., Disp: 180 tablet, Rfl: 3     acetaminophen 500 MG CAPS, Take 2 mg by mouth 2 times daily, Disp: , Rfl:      amitriptyline (ELAVIL) 10 MG tablet, Take 5 tablets (50 mg) by mouth At Bedtime Take 40 mg at bedtime, Disp: 150 tablet, Rfl: 11     amylase-lipase-protease (CREON 24) 21555-05698 units CPEP per EC capsule, Take 3-4 capsules by mouth with meals and 1-2 with snacks. Maximum 15 capsules per day., Disp: 450 capsule, Rfl: 11     aspirin 81 MG EC tablet, Take 1 tablet (81 mg) by mouth daily, Disp: 90 tablet, Rfl: 3     azelastine (ASTELIN) 0.1 % nasal spray, Spray 1 spray into both nostrils 2 times daily, Disp: 1 Bottle, Rfl: 11     blood glucose (PAT CONTOUR NEXT) test strip, Use to test blood sugar 6 times daily or as directed., Disp: 2 Box, Rfl: 11     blood glucose monitoring (PAT MICROLET) lancets, Use to test blood sugar 6-8 times daily or as directed., Disp: 100 each, Rfl: 11     calcium carbonate-vitamin D (CALCIUM 500/D) 500-200 MG-UNIT tablet, , Disp: , Rfl:      canagliflozin  (INVOKANA) 100 MG tablet, Take 1 tablet (100 mg) by mouth every morning (before breakfast), Disp: 30 tablet, Rfl: 11     cetirizine (ZYRTEC) 10 MG tablet, Take 1 tablet (10 mg) by mouth daily, Disp: 90 tablet, Rfl: 3     Continuous Blood Gluc Sensor (FREESTYLE LISA 2 SENSOR) MISC, 1 each every 14 days, Disp: 2 each, Rfl: 11     Continuous Blood Gluc Sensor (FREESTYLE LISA 2 SENSOR) MISC, 1 each every 14 days, Disp: 2 each, Rfl: 11     diphenhydrAMINE (BENADRYL ALLERGY) 25 MG capsule, Take 1 capsule (25 mg) by mouth every 6 hours as needed for itching or allergies, Disp: 56 capsule, Rfl: 0     docusate sodium (COLACE) 100 MG capsule, Take 1 capsule (100 mg) by mouth 2 times daily as needed for constipation,, Disp: 60 capsule, Rfl: 0     estradiol (VAGIFEM) 10 MCG TABS vaginal tablet, Place 1 tablet (10 mcg) vaginally twice a week, Disp: 24 tablet, Rfl: 3     famotidine (PEPCID) 20 MG tablet, Take 1 tablet (20 mg) by mouth 2 times daily, Disp: 180 tablet, Rfl: 3     fish oil-omega-3 fatty acids 1000 MG capsule, , Disp: , Rfl:      FOLIC ACID PO, Take 1 mg by mouth daily, Disp: , Rfl:      glucose 40 % GEL gel, Take 15-30 g by mouth every 15 minutes as needed for low blood sugar, Disp: 3 Tube, Rfl: 2     ibuprofen (ADVIL/MOTRIN) 400 MG tablet, Take 1 tablet (400 mg) by mouth every 6 hours as needed for moderate pain, Disp: 30 tablet, Rfl: 0     levothyroxine (SYNTHROID) 137 MCG tablet, Take 1 tablet (137 mcg) by mouth daily, Disp: 90 tablet, Rfl: 3     lubiprostone (AMITIZA) 8 MCG capsule, Take 3 capsules in AM and 2 capsules in PM  More refills after virtual visit with Ms Figueroa, Disp: 150 capsule, Rfl: 11     magnesium oxide 200 MG TABS, 1,000 mg , Disp: , Rfl:      montelukast (SINGULAIR) 10 MG tablet, TAKE 1 TABLET(10 MG) BY MOUTH AT BEDTIME, Disp: 90 tablet, Rfl: 3     multivitamin CF formula (CHOICEFUL) capsule, Take 1 tablet by mouth daily, Disp: , Rfl: 11     Nutritional Supplements (BOOST HIGH PROTEIN) LIQD,  After above baseline labs are drawn, give: 6 mL/kg to maximum of 360 mL; the beverage is to be consumed within 5 minutes., Disp: , Rfl: 0     omeprazole (PRILOSEC) 40 MG DR capsule, Take 1 capsule (40 mg) by mouth 2 times daily, Disp: 180 capsule, Rfl: 3     order for DME, Equipment being ordered:Cefaly, Disp: 1 Device, Rfl: 0     prochlorperazine (COMPAZINE) 5 MG tablet, Take two 5 mg tablets by mouth every six hours as needed for nausea., Disp: 90 tablet, Rfl: 3     promethazine (PHENERGAN) 25 MG tablet, Take 1 tablet (25 mg) by mouth At Bedtime, Disp: 90 tablet, Rfl: 3     Prucalopride Succinate (MOTEGRITY) 2 MG TABS, Take 2 mg by mouth daily, Disp: 30 tablet, Rfl: 11     senna-docusate (SENOKOT-S;PERICOLACE) 8.6-50 MG per tablet, Take 1-2 tablets by mouth 2 times daily, Disp: 100 tablet, Rfl: 0     Sharps Container (BD SHARPS ) MISC, 1 Container as needed, Disp: 1 each, Rfl: 1     ZOLMitriptan (ZOMIG) 5 MG tablet, Take 1 tablet (5 mg) by mouth at onset of headache for migraine, Disp: 9 tablet, Rfl: 3    SOCIAL HISTORY:  Social History     Socioeconomic History     Marital status:      Spouse name: Norris     Number of children: 3     Years of education: 16     Highest education level: Not on file   Occupational History     Occupation: director     Employer: [employer]   Social Needs     Financial resource strain: Not on file     Food insecurity     Worry: Not on file     Inability: Not on file     Transportation needs     Medical: Not on file     Non-medical: Not on file   Tobacco Use     Smoking status: Former Smoker     Packs/day: 0.50     Years: 6.00     Pack years: 3.00     Types: Cigarettes     Start date: 1985     Quit date: 1992     Years since quittin.4     Smokeless tobacco: Never Used     Tobacco comment: no 2nd hand   Substance and Sexual Activity     Alcohol use: Yes     Alcohol/week: 3.0 - 6.0 standard drinks     Types: 1 - 2 Glasses of wine, 1 - 2 Cans of beer, 1 - 2 Shots  of liquor per week     Comment: occasional     Drug use: No     Sexual activity: Yes     Partners: Male     Birth control/protection: None     Comment: Hystectomy 1999   Lifestyle     Physical activity     Days per week: Not on file     Minutes per session: Not on file     Stress: Not on file   Relationships     Social connections     Talks on phone: Not on file     Gets together: Not on file     Attends Mandaeism service: Not on file     Active member of club or organization: Not on file     Attends meetings of clubs or organizations: Not on file     Relationship status: Not on file     Intimate partner violence     Fear of current or ex partner: Not on file     Emotionally abused: Not on file     Physically abused: Not on file     Forced sexual activity: Not on file   Other Topics Concern     Parent/sibling w/ CABG, MI or angioplasty before 65F 55M? No      Service Not Asked     Blood Transfusions No     Caffeine Concern Not Asked     Occupational Exposure Not Asked     Hobby Hazards Not Asked     Sleep Concern Not Asked     Stress Concern Not Asked     Weight Concern Not Asked     Special Diet Not Asked     Back Care Not Asked     Exercise Not Asked     Bike Helmet Not Asked     Seat Belt Yes     Self-Exams Not Asked   Social History Narrative     with 3 children and a dog.  No smoking, etoh or drug use.  Worked as a  for [employer] in [place] in the past.  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       FAMILY HISTORY:  Family History   Problem Relation Age of Onset     Eye Disorder Father         cataract, detached retina     Myocardial Infarction Father 60     Lipids Father      Cerebrovascular Disease Father      Depression Father      Substance Abuse Father      Anesthesia Reaction Father         stroke right after surgery     Cataracts Father      Osteoarthritis Father      Ulcerative Colitis Father      Lipids Mother      Hypertension Mother       Thyroid Disease Mother      Depression Mother      Angina Mother      GERD Mother      Skin Cancer Mother      Eye Disorder Son         ptosis     Eye Disorder Paternal Grandmother         cataract     Eye Disorder Paternal Grandfather         cataract     Diabetes Paternal Grandfather      Eye Disorder Maternal Grandmother         cataract     Thyroid Disease Maternal Grandmother      Coronary Artery Disease Sister         angioplasty     GERD Sister      Substance Abuse Sister      Depression Sister      Depression Son      Anxiety Disorder Son      Thyroid Disease Sister      Diabetes Maternal Grandfather      Depression Nephew      Anxiety Disorder Nephew      Thyroid Disease Nephew      Diabetes Type 2  Cousin         paternal cousin     Heart Disease Paternal Aunt      Diabetes Paternal Aunt      Diabetes Paternal Uncle      Heart Disease Paternal Uncle      Past/family/social history reviewed and no changes    PHYSICAL EXAMINATION:  General appearance: Healthy appearing adult,  Eyes: Sclera anicteric, Pupils round and reactive to light  Ears, nose, mouth and throat: No obvious external lesions of ears and nose, Hearing intact  Neck: Symmetric, No obvious external lesions  Respiratory: Normal respiration, no use of accessory muscles   Skin: No rashes or jaundice   Psychiatric: Oriented to person, place and time,  very tearful during today's visit     PERTINENT STUDIES:  Orders Only on 09/13/2018   Component Date Value Ref Range Status     WBC 09/13/2018 5.8  4.0 - 11.0 10e9/L Final     RBC Count 09/13/2018 4.51  3.8 - 5.2 10e12/L Final     Hemoglobin 09/13/2018 14.1  11.7 - 15.7 g/dL Final     Hematocrit 09/13/2018 42.1  35.0 - 47.0 % Final     MCV 09/13/2018 93  78 - 100 fl Final     MCH 09/13/2018 31.3  26.5 - 33.0 pg Final     MCHC 09/13/2018 33.5  31.5 - 36.5 g/dL Final     RDW 09/13/2018 13.2  10.0 - 15.0 % Final     Platelet Count 09/13/2018 430  150 - 450 10e9/L Final     Diff Method 09/13/2018  Automated Method   Final     % Neutrophils 09/13/2018 37.1  % Final     % Lymphocytes 09/13/2018 40.5  % Final     % Monocytes 09/13/2018 10.5  % Final     % Eosinophils 09/13/2018 9.8  % Final     % Basophils 09/13/2018 2.1  % Final     % Immature Granulocytes 09/13/2018 0.0  % Final     Nucleated RBCs 09/13/2018 0  0 /100 Final     Absolute Neutrophil 09/13/2018 2.2  1.6 - 8.3 10e9/L Final     Absolute Lymphocytes 09/13/2018 2.4  0.8 - 5.3 10e9/L Final     Absolute Monocytes 09/13/2018 0.6  0.0 - 1.3 10e9/L Final     Absolute Eosinophils 09/13/2018 0.6  0.0 - 0.7 10e9/L Final     Absolute Basophils 09/13/2018 0.1  0.0 - 0.2 10e9/L Final     Abs Immature Granulocytes 09/13/2018 0.0  0 - 0.4 10e9/L Final     Absolute Nucleated RBC 09/13/2018 0.0   Final     Sodium 09/13/2018 138  133 - 144 mmol/L Final     Potassium 09/13/2018 4.3  3.4 - 5.3 mmol/L Final     Chloride 09/13/2018 102  94 - 109 mmol/L Final     Carbon Dioxide 09/13/2018 30  20 - 32 mmol/L Final     Anion Gap 09/13/2018 5  3 - 14 mmol/L Final     Glucose 09/13/2018 119* 70 - 99 mg/dL Final     Urea Nitrogen 09/13/2018 12  7 - 30 mg/dL Final     Creatinine 09/13/2018 0.83  0.52 - 1.04 mg/dL Final     GFR Estimate 09/13/2018 72  >60 mL/min/1.7m2 Final     GFR Estimate If Black 09/13/2018 87  >60 mL/min/1.7m2 Final     Calcium 09/13/2018 9.2  8.5 - 10.1 mg/dL Final     Bilirubin Direct 09/13/2018 0.2  0.0 - 0.2 mg/dL Final     Bilirubin Total 09/13/2018 0.8  0.2 - 1.3 mg/dL Final     Albumin 09/13/2018 3.9  3.4 - 5.0 g/dL Final     Protein Total 09/13/2018 7.8  6.8 - 8.8 g/dL Final     Alkaline Phosphatase 09/13/2018 145  40 - 150 U/L Final     ALT 09/13/2018 59* 0 - 50 U/L Final     AST 09/13/2018 39  0 - 45 U/L Final     IGG 09/13/2018 1160  695 - 1620 mg/dL Final     IgG1 09/13/2018 416  300 - 856 mg/dL Final     IgG2 09/13/2018 384  158 - 761 mg/dL Final     IgG3 09/13/2018 83  24 - 192 mg/dL Final     IgG4 09/13/2018 95* 11 - 86 mg/dL Final        Again, thank you for allowing me to participate in the care of your patient.      Sincerely,    Rosanne Marti PA-C

## 2021-06-04 NOTE — NURSING NOTE
Chief Complaint   Patient presents with     Follow Up       Vitals:    06/04/21 0943   Weight: 67.1 kg (148 lb)       Body mass index is 22.5 kg/m .    Saundra Jaquez CMA

## 2021-06-08 ENCOUNTER — TELEPHONE (OUTPATIENT)
Dept: GASTROENTEROLOGY | Facility: CLINIC | Age: 56
End: 2021-06-08

## 2021-06-08 NOTE — TELEPHONE ENCOUNTER
MTM referral from: [facility] visit (referral by provider)    MTM referral outreach attempt #2 on June 8, 2021 at 3:35 PM      Outcome: Patient not reachable after several attempts, will route to MTM Pharmacist/Provider as an FYI. Thank you for the referral.    Todd Morales, MTM coordinator

## 2021-06-09 NOTE — TELEPHONE ENCOUNTER
Resubmitted case with updated information    PA Initiation    Medication: Prucalopride Succinate (MOTEGRITY) 2 MG TABS--DENIED  Insurance Company: [employer] - Phone [phone] Fax [phone]  Pharmacy Filling the Rx: [id number] - [place], [place] - [facility]  AT [facility] [address]  Filling Pharmacy Phone: [phone]  Filling Pharmacy Fax: [phone]  Start Date: 4/9/2021

## 2021-06-09 NOTE — PROGRESS NOTES
Dinora has now tried and failed Trulance. She experienced worsening of symptoms on Trulance per chart notes. I connected with Rosanne Marti PA-C who would like to re-initiate the PA with this information.

## 2021-06-10 NOTE — TELEPHONE ENCOUNTER
Prior Authorization Approval    Authorization Effective Date: 6/8/2021  Authorization Expiration Date: 6/8/2022  Medication: Prucalopride Succinate (MOTEGRITY) 2 MG TABS--APPROVED  Approved Dose/Quantity:   Reference #:     Insurance Company: [employer] - Phone [phone] Fax [phone]  Expected CoPay:       CoPay Card Available:      Foundation Assistance Needed:    Which Pharmacy is filling the prescription (Not needed for infusion/clinic administered): [id number] - [place], [place] - [address] AT [facility] [address]   Pharmacy Notified: Yes  Patient Notified: Yes **Instructed pharmacy to notify patient when script is ready to /ship.**

## 2021-06-11 NOTE — PROGRESS NOTES
Patient called and voicemail left instructing her to check her MyChart messages.    Rosanne Marti PA-C  Division of Gastroenterology, Hepatology & Nutrition  [facility]

## 2021-06-16 ENCOUNTER — VIRTUAL VISIT (OUTPATIENT)
Dept: PHARMACY | Facility: CLINIC | Age: 56
End: 2021-06-16
Attending: PHYSICIAN ASSISTANT
Payer: COMMERCIAL

## 2021-06-16 DIAGNOSIS — E03.9 HYPOTHYROIDISM, UNSPECIFIED TYPE: ICD-10-CM

## 2021-06-16 DIAGNOSIS — G43.709 CHRONIC MIGRAINE WITHOUT AURA WITHOUT STATUS MIGRAINOSUS, NOT INTRACTABLE: ICD-10-CM

## 2021-06-16 DIAGNOSIS — N95.2 ATROPHIC VAGINITIS: ICD-10-CM

## 2021-06-16 DIAGNOSIS — K31.84 GASTROPARESIS: ICD-10-CM

## 2021-06-16 DIAGNOSIS — Z78.9 TAKES DIETARY SUPPLEMENTS: ICD-10-CM

## 2021-06-16 DIAGNOSIS — E13.9 POST-PANCREATECTOMY DIABETES (H): Primary | ICD-10-CM

## 2021-06-16 DIAGNOSIS — E89.1 POST-PANCREATECTOMY DIABETES (H): Primary | ICD-10-CM

## 2021-06-16 DIAGNOSIS — Z90.410 POST-PANCREATECTOMY DIABETES (H): Primary | ICD-10-CM

## 2021-06-16 DIAGNOSIS — J30.2 SEASONAL ALLERGIC RHINITIS, UNSPECIFIED TRIGGER: ICD-10-CM

## 2021-06-16 PROCEDURE — 99605 MTMS BY PHARM NP 15 MIN: CPT | Performed by: PHARMACIST

## 2021-06-16 PROCEDURE — 99607 MTMS BY PHARM ADDL 15 MIN: CPT | Performed by: PHARMACIST

## 2021-06-16 NOTE — PROGRESS NOTES
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    Migraines: Unchanged.    Abdominal Pain/Nausea/Vomiting/Constipation: Appears to be improving on Motegrity. In regards to Rosanne's inquiry, it would be reasonable to trial erythromycin if needed. There is a moderate interaction which may increase side effects of estrogen, however, Dinora is using this topically and I do not suspect it will have a large impact. Buspirone would be another consideration given the lack of major drug-drug interactions. We did discuss the potential risk of serotonin syndrome given she is on amitriptyline and how to monitor for this.    Allergies: Stable based on report.    Atrophic Vaginitis: Stable based on report.    Hypothyroidism: Stable based on report.    Supplements: We discussed that fish oil is often difficult to tolerate, even when \"burpless\" and may lead to indigestion. Her last essential fatty acid panel was otherwise normal besides an elevated DHA back in December, therefore I will inquire with her transplant team if it would be okay to trial off supplementation.     PLAN:                            1. Hattie to inquire about on going need for fish oil -- contact Lidya Gaffney at the request of Dinora   - staff message sent    2. Will route chart to Rosanne Marti PA-C with comments on medication interactions in question    Follow-up: as needed per patient preference    SUBJECTIVE/OBJECTIVE:                          Dinora Mcghee is a 56 year old female called for an initial visit. She was referred to me from Rosanne Marti PA-C.      Reason for visit: medication review requested by a provider   -- can buspar be taken with Imitrex   -- is erythromycin an option for gastroparesis    Allergies/ADRs: Reviewed in chart  Tobacco: She reports that she quit smoking about 29 " years ago. Her smoking use included cigarettes. She started smoking about 35 years ago. She has a 3.00 pack-year smoking history. She has never used smokeless tobacco.  Alcohol: not currently using    Medication Adherence/Access: no issues reported   -- happy that Motegrity finally got covered     Post-pancreatectomy Diabetes:   Acarbose 50 mg with each meal (may take 2)    S/p islet cell autotransplant. Notes she has used CGM and does have the Henable contour meter for back-up. She is not on insulin at this point in time. Notes she did try taking 2 acarbose with high carb meal. Eats about six times daily, one cup at a time. Pancake yesterday went moderately okay. Cottage cheese went poorly - caused pain for about three hours. She also drinks Ensure.    Lab Results   Component Value Date    A1C 5.7 05/12/2021    A1C 5.9 04/01/2021    A1C 5.5 12/17/2020    A1C 5.8 07/30/2020    A1C 5.6 01/31/2020     Migraines:   Amitriptyline 40 mg at bedtime  Zolmitriptan 5 mg as needed    Reports she did reduce to 40 mg amitriptyline as at 50 mg dose she feels groggy. She notes her triptan is needed, but that if she uses it too late into the migraine it does nothing.    Abdominal Pain/Nausea/Vomiting/Constipation:   Creon 1-2 capsules with meals (using on average 12 capsules/day)  diphendhyramine 25 mg daily for nausea  Senna-docusate as needed  Famotidine 20 mg in the morning (update)   Omeprazole 40 mg in the evening (update)  Prochlorperazine 5 mg every six hours as needed   Promethazine 25 mg at bedtime   motegrity 2 mg daily     Just ended spring bloom for cetirizine. Was able to remove prochlorperazine and promethazine, but now she is back to compazine daily/twice daily and promethazine about three times weekly. Eats mostly white carbs (rice, crackers), lactose free yogurt, banana/pears etc. As well as her Ensure. She notes that she does eat more frequent small meals and she is trying to advance her diet.    Allergies:    azelastine nasal spray daily  Cetirizine 10 mg daily  Diphenhydramine 25 mg daily when needed  Montelukast 10 mg daily (seasonally)  Zatidor eye drops daily    No reported side effects or concerns. She notes that her allergies are seasonal and she will typically use these during certain times of the year.    Atrophic Vaginitis:   Estradiol 10 mcg vaginally twice weekly    No reported side effects or concerns.    Hypothyroidism:  Levothyroxine 137 mcg daily     No reported symptoms of hypo/hyperthyroidism.    TSH   Date Value Ref Range Status   09/14/2020 2.41 0.40 - 4.00 mU/L Final     Supplements:   Fish oil 1000 mg daily   Folic acid 1 mg daily   MVW daily   Ensure 2 daily    Ferrous sulfate 55 mcg daily (add)    She agrees that fish oil can be difficult to tolerate. She notes trying the burpless, which is a little better but still not great.    She reports she stopped taking:   -ASA  -calcium/D  ----------------    I spent 55 minutes with this patient today. I offer these suggestions for consideration by her care team. A copy of the visit note was provided to the patient's referring provider.    The patient was sent via [employer] a summary of these recommendations.     Hattie VanceD, BCACP  MTM Pharmacist   [facility]  Phone: [phone]    Telemedicine Visit Details  Type of service:  Telephone visit  Start Time: 8:15 AM    -- Dinora was unavailable on the first call  End Time: 9:11 AM  Originating Location (patient location): Home  Distant Location (provider location):  [facility]      Medication Therapy Recommendations  Gastroparesis    Current Medication: fish oil-omega-3 fatty acids 1000 MG capsule   Rationale: Undesirable effect - Adverse medication event - Safety   Recommendation: Discontinue Medication   Status: Contact Provider - Awaiting Response

## 2021-06-16 NOTE — Clinical Note
Ap Lay,     To answer your question - I think you could consider either buspirone or erythromycin going forward if needed. There are drug-drug interactions, but neither of them are major and could be monitored. Please see my assessment for more information. Thank you!

## 2021-06-21 ASSESSMENT — PATIENT HEALTH QUESTIONNAIRE - PHQ9: SUM OF ALL RESPONSES TO PHQ QUESTIONS 1-9: 13

## 2021-06-21 ASSESSMENT — ANXIETY QUESTIONNAIRES
7. FEELING AFRAID AS IF SOMETHING AWFUL MIGHT HAPPEN: NOT AT ALL
GAD7 TOTAL SCORE: 0
2. NOT BEING ABLE TO STOP OR CONTROL WORRYING: NOT AT ALL
3. WORRYING TOO MUCH ABOUT DIFFERENT THINGS: NOT AT ALL
GAD7 TOTAL SCORE: 0
6. BECOMING EASILY ANNOYED OR IRRITABLE: NOT AT ALL
7. FEELING AFRAID AS IF SOMETHING AWFUL MIGHT HAPPEN: NOT AT ALL
4. TROUBLE RELAXING: NOT AT ALL
1. FEELING NERVOUS, ANXIOUS, OR ON EDGE: NOT AT ALL
5. BEING SO RESTLESS THAT IT IS HARD TO SIT STILL: NOT AT ALL

## 2021-06-22 ASSESSMENT — ANXIETY QUESTIONNAIRES: GAD7 TOTAL SCORE: 0

## 2021-06-22 ASSESSMENT — PATIENT HEALTH QUESTIONNAIRE - PHQ9: SUM OF ALL RESPONSES TO PHQ QUESTIONS 1-9: 13

## 2021-06-22 NOTE — PATIENT INSTRUCTIONS
"Recommendations from today's MTM visit:                                                    MTM (medication therapy management) is a service provided by a clinical pharmacist designed to help you get the most of out of your medicines.   Today we reviewed what your medicines are for, how to know if they are working, that your medicines are safe and how to make your medicine regimen as easy as possible.      1. I will reach out to your transplant team (Lidya Gaffney) to ask if you still need to supplement with fish oil. This can be difficult to tolerate even when \"burpless\" and may be contributing to some of your GI symptoms.    2. Rosanne Marti PA-C had inquired about two medications (buspirone and erythromycin) in terms of medication interactions. Both of these would be okay to consider given your other medications if needed in the future.    Follow-up: as needed per patient preference     It was great to speak with you today.  I value your experience and would be very thankful for your time with providing feedback on our clinic survey. You may receive a survey via email or text message in the next few days.     To schedule another MTM appointment, please call the clinic directly or you may call the MTM scheduling line at [phone] or toll-free at [phone].     My Clinical Pharmacist's contact information:                                                      Please feel free to contact me with any questions or concerns you have.      Hattie Lazo PharmD, BCACP  MTM Pharmacist    [facility]  Phone: [phone]    "

## 2021-06-23 ENCOUNTER — MYC MEDICAL ADVICE (OUTPATIENT)
Dept: PHARMACY | Facility: CLINIC | Age: 56
End: 2021-06-23

## 2021-06-24 ENCOUNTER — VIRTUAL VISIT (OUTPATIENT)
Dept: PSYCHOLOGY | Facility: CLINIC | Age: 56
End: 2021-06-24
Payer: COMMERCIAL

## 2021-06-24 DIAGNOSIS — F43.23 ADJUSTMENT DISORDER WITH MIXED ANXIETY AND DEPRESSED MOOD: Primary | ICD-10-CM

## 2021-06-24 PROCEDURE — 90791 PSYCH DIAGNOSTIC EVALUATION: CPT | Mod: 95 | Performed by: STUDENT IN AN ORGANIZED HEALTH CARE EDUCATION/TRAINING PROGRAM

## 2021-06-24 NOTE — PROGRESS NOTES
"  Health Psychology                                                                                                                          Sweta Michelle, Ph.D., L.P [phone]  Melva Beebe, Ph.D., L.P. [phone]  Rachel Sloan, Ph.D. [phone]  Kenya Sanchez, Ph.D., L.P. [phone]  Estrada Patel, Ph.D., A.B.P.P., L.P. [phone]         Enedina Joaquin, Ph.D., L.P. [phone]                [facility], 3rd Floor  [address] [place] [address]    Confidential Summary of Standard Psychodiagnostic Evaluation* - Telehealth Visit    Dinora Mcghee is a 56 year old female who is being evaluated via a billable video visit.       The patient has been notified of following:      \"This video visit will be conducted via a video visit between you and your physician/provider. We have found that certain health care needs can be provided without the need for an in-person physical exam.  This service lets us provide the care you need with a video conversation.  If a prescription is necessary we can send it directly to your pharmacy.  If lab work is needed we can place an order for that and you can then stop by our lab to have the test done at a later time.     Video visits are billed at different rates depending on your insurance coverage.  Please reach out to your insurance provider with any questions.     If during the course of the call the physician/provider feels a video visit is not appropriate, you will not be charged for this service.\"     Patient has given verbal consent for Video visit? Yes     Will anyone else be joining your video visit? no    Date of Service:  6/24/21    Referral Source:  [facility]    Reason for Referral:  Coping with health conditions    Informed Consent:  Provided information about confidentiality and limits thereof.  Discussed this provider's status as a fellow and the fact that a licensed psychologist, Dr." "Daniel, will be co-signing notes and discussing treatment plans.  Informed patient that they can meet with the supervisor if they want which they declined. Ms. Mcghee expressed understanding and agreed to proceed.    Sources of Information:  Information was obtained from a clinical interview with the patient, review of available medical records, and administration of psychological assessments.     History of Presenting Concerns:  Dinora Mcghee is a 56 year old female with history of pancreatitis s/p TPIAT (11/2016), gastroparesis, migraine headaches, hypothyroidism, and history of pituitary adenoma. She was referred to Health Psychology by Rosanne Marti due to difficulty coping with escalating GI symptoms and worry about possibility of needing a feeding tube again.     Ms. Mcghee provided background of health history and context of referral to Health Psychology. Prior to November of 2020 she said that she was managing GI symptoms fairly well.  However, she began to have increased nausea and pain after eating and currently her symptoms are interfering greatly with eating, sleep, and her overall quality of life. She is also losing more weight than she would like which she describes as \"exhausting\".  She worries greatly she will need a feeding tube and worries about her ability to tackle yet another health issue given history of complex health conditions. Her pain is consistently between 5-7 and sometimes more. She has persistent nausea and chronic headaches all the time.      Ms. Mcghee said that due to symptoms and emotional fatigue of dealing with them she is struggling to care for herself.  Because of this fatigue she does not feel that she would want a feeding tube if that was recommended and she wants her family to be ok with that.     Psychologically, Ms. Mcghee endorsed feelings of fear, worry, and sadness due to health issues.  She said that she struggles the most at night because she lies down and " "\"goes bananas\", can't get comfortable ad has pain.  An example of this was last night she was up until 3:00AM with gas.  She reports difficulty sleeping, fatigue during the day.  She is noticing she has started to \"flake\" on some social engagements because of symptoms - physical and emotional. Fears include: worry about continued physical decline, fear of feeding tube. She expressed reduced interest in pleasant activities.  She denied suicidal ideation but did express ambivalence about pursuing potential life saving treatment I requried of her (I.e. feeding tube).     Ms. Mcghee is managing GI symptoms with medications, dietary changes, and behavioral skills such as using a heating pad.     After discussing stressors and emotional responses she also noted she has many positive things going on in her life related to her job, her family, and social support.  She is currently the  of a [employer] and works about 30hrs/week.     Ms. Mcghee described utilizing many coping skills including daily meditation, keeping a gratitude journal, and setting intentions daily.  She has practiced these skills for many years and finds them effective normally.  Lately they have still been helpful but she notes needing to use them more and more and distress increases.   .     Medical History:    Past Medical History:   Diagnosis Date     Allergic rhinitis, cause unspecified      Allergy to other foods     strawberries, apples, celeries, alice, watermelon     Arthritis     left knee     Choledocholithiasis     long after cholecystectomy     Chronic abdominal pain      Chronic constipation      Chronic nausea      Chronic pancreatitis (H)      Degeneration of lumbar or lumbosacral intervertebral disc      Esophageal reflux     w/ hiatal hernia     Gastroparesis      Hiatal hernia      History of pituitary adenoma     s/p resection     Hypothyroidism      Migraines      Mild hyperlipidemia      On tube feeding diet     presence of GJ tube "     Pancreatic disease     PD stricture, 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 endometriosis growth     APPENDECTOMY       C  DELIVERY ONLY       C  DELIVERY ONLY      repeat c section with incidental cystotomy with repair     C EXCIS PITUITARY,TRANSNASAL/SEPTAL  1980    pituitary tumor removed for diabetes insipidus     C TOTAL ABDOM HYSTERECTOMY      w/ bilateral salpingoophorectomy       SECTION       COLONOSCOPY       ENDOSCOPIC RETROGRADE CHOLANGIOPANCREATOGRAM N/A 2015    Procedure: ENDOSCOPIC RETROGRADE CHOLANGIOPANCREATOGRAM;  Surgeon: Mandeep Park MD;  [facility]: [facility]     ENDOSCOPIC RETROGRADE CHOLANGIOPANCREATOGRAM N/A 2016    Procedure: COMBINED ENDOSCOPIC RETROGRADE CHOLANGIOPANCREATOGRAPHY, PLACE TUBE/STENT;  Surgeon: Mandeep Park MD;  [facility]     ENDOSCOPIC RETROGRADE CHOLANGIOPANCREATOGRAM N/A 3/17/2016    Procedure: COMBINED ENDOSCOPIC RETROGRADE CHOLANGIOPANCREATOGRAPHY, REMOVE FOREIGN BODY OR STENT/TUBE;  Surgeon: Mandeep Park MD;  [facility]     ENDOSCOPIC RETROGRADE CHOLANGIOPANCREATOGRAM N/A 2016    Procedure: COMBINED ENDOSCOPIC RETROGRADE CHOLANGIOPANCREATOGRAPHY, PLACE TUBE/STENT;  Surgeon: Mandeep Park MD;  [facility]     ENDOSCOPIC RETROGRADE CHOLANGIOPANCREATOGRAM N/A 2016    Procedure: COMBINED ENDOSCOPIC RETROGRADE CHOLANGIOPANCREATOGRAPHY, REMOVE FOREIGN BODY OR STENT/TUBE;  Surgeon: Mandeep Park MD;  [facility]     ENDOSCOPIC ULTRASOUND UPPER GASTROINTESTINAL TRACT (GI) N/A 10/3/2016    Procedure: ENDOSCOPIC ULTRASOUND, ESOPHAGOSCOPY / UPPER GASTROINTESTINAL TRACT (GI);  Surgeon: Guru Jose Rodas  MD Tuyet;  Location: [facility]     ESOPHAGOSCOPY, GASTROSCOPY, DUODENOSCOPY (EGD), COMBINED N/A 6/24/2015    Procedure: COMBINED ESOPHAGOSCOPY, GASTROSCOPY, DUODENOSCOPY (EGD), REMOVE FOREIGN BODY;  Surgeon: Mandeep Park MD;  [facility]     ESOPHAGOSCOPY, GASTROSCOPY, DUODENOSCOPY (EGD), COMBINED N/A 10/25/2015    Procedure: COMBINED ESOPHAGOSCOPY, GASTROSCOPY, DUODENOSCOPY (EGD);  Surgeon: Sammy Amaro MD;  [facility]     ESOPHAGOSCOPY, GASTROSCOPY, DUODENOSCOPY (EGD), COMBINED N/A 10/25/2015    Procedure: COMBINED ESOPHAGOSCOPY, GASTROSCOPY, DUODENOSCOPY (EGD), BIOPSY SINGLE OR MULTIPLE;  Surgeon: Sammy Amaro MD;  [facility]: [facility]     ESOPHAGOSCOPY, GASTROSCOPY, DUODENOSCOPY (EGD), DILATATION, COMBINED       EXCISE LESION TRUNK N/A 4/17/2017    Procedure: EXCISE LESION TRUNK;  Removal of Abdominal Foreign Body;  Surgeon: Nesotr Phoenix MD;  [facility]     HC ESOPH/GAS REFLUX TEST W NASAL IMPED >1 HR N/A 11/19/2015    Procedure: ESOPHAGEAL IMPEDENCE FUNCTION TEST WITH 24 HOUR PH GREATER THAN 1 HOUR;  Surgeon: Thiago Apple MD;  [facility]: [facility]     HC UGI ENDOSCOPY DIAG W BIOPSY  9/17/08     HC UGI ENDOSCOPY DIAG W BIOPSY  9/27/12     HC UGI ENDOSCOPY W ESOPHAGEAL DILATION BALLOON <30MM  9/17/08     HC UGI ENDOSCOPY W EUS N/A 5/5/2015    Procedure: COMBINED ENDOSCOPIC ULTRASOUND, ESOPHAGOSCOPY, GASTROSCOPY, DUODENOSCOPY (EGD);  Surgeon: Wm Dueñas MD;  Location: [facility]     HC WRIST ARTHROSCOP,RELEASE XVERS LIG Bilateral 12/17/08     INJECT TRANSVERSUS ABDOMINIS PLANE (TAP) BLOCK BILATERAL Left 9/22/2016    Procedure: INJECT TRANSVERSUS ABDOMINIS PLANE (TAP) BLOCK BILATERAL;  Surgeon: Dickson Corrigan MD;  [facility]     laparoscopic pineda  1995     LAPAROSCOPIC HERNIORRHAPHY INCISIONAL N/A 8/23/2018    Procedure: LAPAROSCOPIC HERNIORRHAPHY INCISIONAL;  Laparoscopic Incisional Hernia Repair with Symbotex Mesh Implant;  Surgeon: Nestor Phoenix MD;   [facility]     LAPAROSCOPIC PANCREATECTOMY, TRANSPLANT AUTO ISLET CELL N/A 12/28/2016    Procedure: LAPAROSCOPIC PANCREATECTOMY, TRANSPLANT AUTO ISLET CELL;  Surgeon: Nestor Phoenix MD;  [facility]: [facility]     transphenoidal pituitary resection  1990       Current Outpatient Medications   Medication     acarbose (PRECOSE) 50 MG tablet     amitriptyline (ELAVIL) 10 MG tablet     amylase-lipase-protease (CREON 24) 35162-01983 units CPEP per EC capsule     azelastine (ASTELIN) 0.1 % nasal spray     blood glucose (PAT CONTOUR NEXT) test strip     blood glucose monitoring (PAT MICROLET) lancets     cetirizine (ZYRTEC) 10 MG tablet     Continuous Blood Gluc Sensor (FREESTYLE LISA 2 SENSOR) MISC     diphenhydrAMINE (BENADRYL ALLERGY) 25 MG capsule     estradiol (VAGIFEM) 10 MCG TABS vaginal tablet     famotidine (PEPCID) 20 MG tablet     FOLIC ACID PO     glucose 40 % GEL gel     ibuprofen (ADVIL/MOTRIN) 400 MG tablet     levothyroxine (SYNTHROID) 137 MCG tablet     montelukast (SINGULAIR) 10 MG tablet     multivitamin CF formula (CHOICEFUL) capsule     omeprazole (PRILOSEC) 40 MG DR capsule     order for DME     prochlorperazine (COMPAZINE) 5 MG tablet     promethazine (PHENERGAN) 25 MG tablet     Prucalopride Succinate (MOTEGRITY) 2 MG TABS     senna-docusate (SENOKOT-S;PERICOLACE) 8.6-50 MG per tablet     Sharps Container (BD SHARPS ) MISC     ZOLMitriptan (ZOMIG) 5 MG tablet     No current facility-administered medications for this visit.        Psychiatric History:  Ms. Mcghee has participated in outpatient psychotherapy - Maria Elena Abdalla, PhD, for years. Also met with Dr. Sanchez while inpatient. Has dealt with PTSD from medical procedures. Therapy in the past focused on adjustment issues related to chronic health conditions and pain management.  Ms. Mcghee said she has a long-standing pattern of worrying but denied history of primary anxiety disorders including DARCY and OCD, disordered eating, psychosis,  and andreia.  Record indicates remote history of major depressive disorder. Denied history of suciidal ideation and suicide attempts.  Denied history of self-harm behaviors. Records indicate she was prescribed lorazepam in the past for anxiety symptoms and sleep.  Denied history of inpatient psychiatric admissions.      Substance Use History:  Ms. Mcghee denied history of substance use disorders and any chemical dependency treatment.  She has not been drinking alcohol due to symptoms, denied drug use past and present.     Social History:  Ms. Mcghee was born and raised in [place], she described having a positive childhood. She is  and has three adult children with her .  She and her  met in college.  She is currently working as a  of a [employer] which has been a very positive and satisfying experience for her.  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  She also participated in [employer] through this job. Ms. Mcghee's highest level of completed education is college.     Ms. Mcghee has a strong social support network from her family and friends. She has a wide range of support within her network, I.e. people with whom she will share specific information about her health and people with whom she enjoys spending time with, etc.     Psychological Assessment:  The patient completed the following battery of assessments during this psychological evaluation: World Health Organization Disability Assessment Schedule 2.0 12-item (WHODAS), Patient Health Questionnaire-9 (PHQ-9), Generalized Anxiety Disorder-7 screener (DARCY-7), and the CAGE Questionnaire Adapted to Include Drugs (CAGE-AID).    The WHODAS measures disability and functional impairment due to health conditions including diseases, illnesses, injuries, mental or emotional problems, and problems with alcohol or drugs. The possible range of scores is 12-60 and  higher scores indicate higher levels of disability.   WHODAS 2.0 Total Score 5/15/2018 6/21/2021   Total Score 15 20   Total Score MyChart 15 20       The PHQ-9 is an instrument for screening, diagnosing, monitoring and measuring the severity of depression. Scores of 5, 10, 15, and 20 represent cutpoints for mild, moderate, moderately severe and severe depression, respectively.   PHQ-9 SCORE 5/15/2018 7/1/2019 6/21/2021   PHQ-9 Total Score MyChart 4 (Minimal depression) 3 (Minimal depression) 13 (Moderate depression)   PHQ-9 Total Score 4 3 13       The DARCY-7 is an instrument for screening, diagnosing, monitoring and measuring the severity of anxiety. Scores of 5, 10, and 15 represent cutpoints for mild, moderate, and severe anxiety, respectively.  DARCY-7 SCORE 1/11/2018 1/22/2018 6/21/2021   Total Score 2 (minimal anxiety) - 0 (minimal anxiety)   Total Score 2 2 0       The CAGE-AID questionnaire is used to screen for alcohol or drug abuse and dependence in adults. A CAGE-AID score  > 1 is a positive screen, suggesting further discussion is needed to determine if evaluation for alcohol or substance abuse is appropriate. A score > 2 is considered clinically significant, suggesting further evaluation of alcohol or substance-related problems is indicated.  CAGE-AID Total Score 11/8/2016 6/21/2021   Total Score 0 0   Total Score MyChart - 0 (A total score of 2 or greater is considered clinically significant)       Mental Status Examination:  Appearance/Behavior/Orientation: Patient was available for scheduled visit.  She was appropriately groomed and dressed, and demonstrated good eye contact. Alert and oriented to person, place, time, and situation. No evidence of psychomotor agitation.     Cooperation/Reliability: Patient was open and cooperative throughout the session.    Speech/Language: Speech was clear, coherent, and of normal rate, rhythm and volume.   Thought Form: Overall logical and organized.   Thought  Content: Appropriate to interview and situation.  Cognition/Memory: Not formally assessed, but no difficulties apparent upon interview.   Attention/Concentration: Good throughout interview.    Fund of knowledge: Consistent with age and level of education.    Abstract reasoning: Not assessed.   Judgment: Intact.    Mood/Affect: Mood sad; appropriate range of affect.    Insight/Motivation: Good insight, moderate to high motivation for therapy.   Suicide/Assault: Patient denies suicidal or assaultive ideation, plan, or intent but did note ambivalence about possibility of having a feeding tube.     Impression:  Dinora Mcghee is a 56 year old female who reports symptoms of depression and anxiety largely related to health issues.  Currently, she is experiencing increased pain, nausea, and bloating and her symptoms have become more difficult to cope with and more interfering with functioning. She describes feeling exhausted by it all and does not want to escalate treatment if that is what is recommended.  She reports strong cognitive and behavioral coping skills which she has been using.      Diagnosis:  Adjustment disorder with mixed anxiety and depressed mood.       Recommendation/Plan:  Begin therapy to target symptoms of depression related to physical health symptoms; enhance coping abilities.  Treatment will likely be most effective from a cognitive and behavioral approach. Return for therapy.          Type of service: Telemedicine Psychotherapy for coping with health conditions and symptoms of depression and anxiety  Time of service:     Date: 6/24/21    Time Service Began: 2:00    Time Service Ended: 3:00  Reason that telemedicine is appropriate: Public health regulations due to COVID-19 pandemic/state of emergency  Mode of transmission: Secure real time interactive audio and visual telecommunication system via [email or web address]  Location of originating and distant sites:    Originating site (patient location):  Patient's home    Distant site (provider site): Provider's home    Patient has agreed to receiving services via telemedicine technology.      Rachel Sloan LP  Clinical Health Psychology Fellow  Phone: [phone]    *In accordance with the Rules of the [place] Board of [employer], it is noted that psychological descriptions and scientific procedures underlying psychological evaluations have limitations.  Absolute predictions cannot be made based on information in this report.      I did not see this patient directly. I have read and agree with the contents of this note. Kenya Sanchez, PhD, LP, August 24, 2021

## 2021-06-25 ENCOUNTER — PATIENT OUTREACH (OUTPATIENT)
Dept: GASTROENTEROLOGY | Facility: CLINIC | Age: 56
End: 2021-06-25

## 2021-06-25 NOTE — PROGRESS NOTES
Left a message for patient to call back to discuss symptoms.     Patient states she did a bowel cleanout and started in am and did not have results until 4 am the next day   Had bowel movements for two days   Large stool burden    Last bowel movement this week was Tuesday   It still passing gas  Abdomen is bloated but not a new symptom  Has been experiencing headaches  Continues to drink plenty of fluids  Pt scheduled with Ms Marti June 28  Pt does not think her symptoms are unusual but good to have a check in with Ms. Marti   Also did schedule patient with Dr. Genao in August first available

## 2021-06-28 ENCOUNTER — VIRTUAL VISIT (OUTPATIENT)
Dept: GASTROENTEROLOGY | Facility: CLINIC | Age: 56
End: 2021-06-28
Payer: COMMERCIAL

## 2021-06-28 VITALS — BODY MASS INDEX: 21.9 KG/M2 | WEIGHT: 144 LBS

## 2021-06-28 DIAGNOSIS — K59.01 SLOW TRANSIT CONSTIPATION: Primary | ICD-10-CM

## 2021-06-28 DIAGNOSIS — K31.84 GASTROPARESIS: ICD-10-CM

## 2021-06-28 DIAGNOSIS — R14.0 ABDOMINAL BLOATING: ICD-10-CM

## 2021-06-28 PROCEDURE — 99215 OFFICE O/P EST HI 40 MIN: CPT | Mod: 95 | Performed by: PHYSICIAN ASSISTANT

## 2021-06-28 NOTE — PROGRESS NOTES
"Dinora Mcghee is a 56 year old female who is being evaluated via a billable video visit.      The patient has been notified of following:     \"This video visit will be conducted via a call between you and your physician/provider. We have found that certain health care needs can be provided without the need for an in-person physical exam.  This service lets us provide the care you need with a video conversation.  If a prescription is necessary we can send it directly to your pharmacy.  If lab work is needed we can place an order for that and you can then stop by our lab to have the test done at a later time.    If during the course of the call the physician/provider feels a video visit is not appropriate, you will not be charged for this service.\"     Patient confirmed that they are in [place] for today's visit yes.    Video-Visit Details  Type of service:  Video Visit    Video Start Time: 1:02 PM  Video End Time:  1:37 PM    Originating Location (pt. Location): [place]    Distant Location (provider location):  [facility] GASTROENTEROLOGY [facility]INIC [place]     Platform used: 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     1.  Abdominal pain, nausea/vomiting, constipation  Patient reports worsening abdominal pain, nausea and vomiting starting in November 2020.  Of note, had been working on a more vegan diet around the time.  Was evaluated in the emergency department with unremarkable CT scan and lab work-up.  Then " had small bowel follow-through without evidence of stricture or obstruction.  Patient met with our GI dietitian to discuss the gastroparesis diet which initially did seem to make a big improvement in her symptoms, though they did not return to baseline.  She finds the gastroparesis diet very difficult in the setting of diabetes, and living to cook.  In regards to motility medications, we are very limited because she has had reactions to Reglan.  Has discussed other options including BuSpar and erythromycin with our pharmacist-we will consider pending clinical picture.    Patient reported that debilitating bloating was her most bothersome symptom.  She had previously benefited with a course of rifaximin therefore this was repeated without any improvement in her symptoms    In regards to her constipation, patient has tried multiple medications including Linzess and Trulance which resulted in severe loose stools.  Was previously maintaining on Amitiza over max dose, however this recently does not seem to be controlling her symptoms.  She was recently started on Motegrity, hard to tell at this point if her symptoms are improved.  We discussed a plan for her to use some magnesium oxide for the next couple of days.  Will monitor headaches.  We again discussed some Motegrity including mood changes and she was instructed to contact us if she notices any different changes.  --Continue Motegrity as you have been.  Let us know if side effects persist  --Continue with gastroparesis diet, switch to all liquids if symptoms worsen  --Try some magnesium oxide at bedtime to see if this helps any residual constipation    RTC in August    46 Minutes was spent on the date of the encounter during chart review, history and exam, documentation, and further activities as noted     Note completed using voice recognition software. Some word and grammatical errors may occur.    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     History summarized from previous documentation from Dr. Genao and JADEN. Please see previous notes for further details     Gastroparesis  Gastric emptying study with 2-hour study at [facility] have been consistent with gastroparesis, patient had follow-up study here 6/27/2016 with 49% remaining at 4 hours however the patient was not aware she was on narcotics at the time.  Had GJ tubes in fall 2016, NG tube used for venting with tube feeds and was able to wean off of tube feeds after TPAIT.  At initial visit she reported severe migraine headaches associated with vomiting and is on amitriptyline for migraine prophylaxis.  Patient has used several medication for the symptoms including scopolamine patch, prochlorperazine, promethazine which helped symptoms.  She has also been seen by neurology and psychology due to concern that she has a difficult time sleeping and with migraines.  At previous visits, reported nausea with occasional episodes of vomiting.  Her regimen was previously well managed with Compazine in the morning, Phenergan at night.  She did note that nausea and vomiting worsened with migraines.    Patient reports that she had been feeling pretty well up until early November.  Early November the patient noted worsening symptoms.  He was seen at the [facility] emergency department on November 18.  At that time, blood work notable for normal BMP, low lipase, unremarkable hepatic panel.  CBC without elevation of WBCs though she  did have elevated platelets, neutrophils and lymphocytes.  CT at that time with moderate stool burden, nonspecific fluid in small intestine.  No evidence of obstruction per documentation. Had similar episodes after this. This had improved with juicing foods.  States that she has 6-7 small meals a day with about a cup of food at each meal.  States that foods such as toast, bread, gluten seem to go better than healthier foods.  Is having a difficult time balancing foods that she enjoys eating, foods that cause symptoms and foods that are healthy.  Will have nausea vomiting and pain with food triggers.  Of note, patient reported increased stress in the setting of a son who is struggling with his own mental health.  At her visits this spring, she reported that symptoms did seem to be improved with strict dietary measures.     Constipation/irregular bowel movements/bloating  Patient reports history of ongoing constipation for over 20 years after having her first child.  She has tried multiple interventions including a bowel cleanout, MiraLAX, senna, milk of magnesium, Linzess, and Amitiza for which she was on when she first came to [facility] and  GI clinic.  Patient also takes creon.  Patient has done a course of rifaximin in the past with with improvement in bloating and stool consistency.  Patient has also been on specific SIBO/FODMAP diets to the [facility] while avoiding certain food triggers.  Previous pattern would be bowel movements with certain days where she would clear out with multiple bowel movements in 1 day    GERD, Dysphagia   Summarized/taken from prior documentation   Patient experiences GERD as substernal and throat burning with nocturnal relaxant causing choking. She had previously followed with Dr. George Flores in out clinic and reports a prior Schatzki's ring requiring previous dilation. Manometry was noral, and pH impedence had a DeMeester of 24 with positive symptoms association. Patient has treated  GERD symptoms with BID PPI, Carafate, H2 blocker, and tums. At her last visit, she had been experiencing heartburn and has restarted omeprazole 40 mg daily with continued symptoms with specific food trigger.  Had also previously been on Zantac up to 300 mg a day.    Interaval History 6/28/2021:  Given ongoing symptoms this spring and concern that constipation was contributing to her symptoms, we hoped to try Motegrity which was initially not covered by her insurance.  She tried Trulance first and had uncontrollable diarrhea.  She reports that this significantly impacted her mental health, questioned her desire to live with these debilitating symptoms.  Continue to report severe bloating.  After her last visit, we did a trial of rifaximin for 2 weeks.  This did not worsen her symptoms, but also did not improve her symptoms.  Motegrity was then covered by her insurance which she started around June 11.  Has met with our pharmacist to discuss this and other options moving forward.  Most bothersome symptom since starting Motegrity is a headache.  Within this past week, her symptoms do seem to be slightly improved.  She also took a slightly higher dose of the amitriptyline with 50 mg as opposed to 40 mg which seemed to improve her symptoms.  She states it is too early to tell at this point of Motegrity is improving her symptoms more than Amitiza.    Does report some continued gas and increased flatus.  This kept her up last week, she also reported abdominal pain which radiated to her back.  This has been improved.    Has been taking ensure, yogurt (greek), fruit. Bananas go ok, cantaloupe, white toast. Last week she pretty much did liquids.  Potato soup worsened symptoms.     Stool consistency: Really long bowel movements, feels a little light-headed. Not hard, long stool. 1 today, 2 yesterday.    PROBLEM LIST  Patient Active Problem List    Diagnosis Date Noted     Iron deficiency anemia 03/22/2019     Priority: Medium      Headache, chronic migraine without aura 09/18/2018     Priority: Medium     Chronic pain syndrome 09/18/2018     Priority: Medium     S/P hernia repair 08/23/2018     Priority: Medium     Incisional hernia, without obstruction or gangrene 05/20/2018     Priority: Medium     Adhesive capsulitis of shoulder, unspecified laterality 11/14/2017     Priority: Medium     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  Document previously scanned on 1/12/17.  Validation form completed and scanned.  Code Status reflects choices in most recent ACP document..  Confirmed/documented designated decision maker(s).  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 Provider to review and confirm.       GERD (gastroesophageal reflux disease) 12/01/2010     Priority: Medium     Lumbago 04/18/2005     Priority: Medium     History of L5-S1 degenerative disk disease.         Sensorineural hearing loss 01/10/2005     Priority: Medium     Problem list name updated by automated process. Provider to review       Vertiginous syndrome and labyrinthine disorder 01/10/2005     Priority: Medium     Problem list name updated by automated process. Provider to review  IMO Regulatory Load OCT 2020       Sprain and strain of other specified sites of hip and thigh 12/09/2002     Priority: Medium      Chondromalacia of patella 2002     Priority: Medium     Need for prophylactic immunotherapy      Priority: Medium     trees, grass, srw, dust mites, cat       Allergic rhinitis due to other allergen 2001     Priority: Medium     Hypothyroidism      Priority: Medium     Problem list name updated by automated process. Provider to review         PERTINENT PAST MEDICAL HISTORY:  Past Medical History:   Diagnosis Date     Allergic rhinitis, cause unspecified      Allergy to other foods     strawberries, apples, celeries, alice, watermelon     Arthritis     left knee     Choledocholithiasis     long after cholecystectomy     Chronic abdominal pain      Chronic constipation      Chronic nausea      Chronic pancreatitis (H)      Degeneration of lumbar or lumbosacral intervertebral disc      Esophageal reflux     w/ hiatal hernia     Gastroparesis      Hiatal hernia      History of pituitary adenoma     s/p resection     Hypothyroidism      Migraines      Mild hyperlipidemia      On tube feeding diet     presence of GJ tube     Pancreatic disease     PD stricture, 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 endometriosis growth     APPENDECTOMY       C  DELIVERY ONLY       C  DELIVERY ONLY      repeat c section with incidental cystotomy with repair     C EXCIS PITUITARY,TRANSNASAL/SEPTAL      pituitary tumor removed for diabetes insipidus     C TOTAL ABDOM HYSTERECTOMY      w/ bilateral salpingoophorectomy       SECTION       COLONOSCOPY       ENDOSCOPIC RETROGRADE CHOLANGIOPANCREATOGRAM N/A 2015    Procedure: ENDOSCOPIC RETROGRADE CHOLANGIOPANCREATOGRAM;  Surgeon: Mandeep Park MD;   [facility]: [facility]     ENDOSCOPIC RETROGRADE CHOLANGIOPANCREATOGRAM N/A 2/9/2016    Procedure: COMBINED ENDOSCOPIC RETROGRADE CHOLANGIOPANCREATOGRAPHY, PLACE TUBE/STENT;  Surgeon: Mandeep Park MD;  Location: [facility]     ENDOSCOPIC RETROGRADE CHOLANGIOPANCREATOGRAM N/A 3/17/2016    Procedure: COMBINED ENDOSCOPIC RETROGRADE CHOLANGIOPANCREATOGRAPHY, REMOVE FOREIGN BODY OR STENT/TUBE;  Surgeon: Mandeep Park MD;  Location: [facility]     ENDOSCOPIC RETROGRADE CHOLANGIOPANCREATOGRAM N/A 8/2/2016    Procedure: COMBINED ENDOSCOPIC RETROGRADE CHOLANGIOPANCREATOGRAPHY, PLACE TUBE/STENT;  Surgeon: Mandeep Park MD;  [facility]     ENDOSCOPIC RETROGRADE CHOLANGIOPANCREATOGRAM N/A 8/26/2016    Procedure: COMBINED ENDOSCOPIC RETROGRADE CHOLANGIOPANCREATOGRAPHY, REMOVE FOREIGN BODY OR STENT/TUBE;  Surgeon: Mandeep Park MD;  Location: [facility]     ENDOSCOPIC ULTRASOUND UPPER GASTROINTESTINAL TRACT (GI) N/A 10/3/2016    Procedure: ENDOSCOPIC ULTRASOUND, ESOPHAGOSCOPY / UPPER GASTROINTESTINAL TRACT (GI);  Surgeon: Guru Jose Rodas MD;  Location: [facility]     ESOPHAGOSCOPY, GASTROSCOPY, DUODENOSCOPY (EGD), COMBINED N/A 6/24/2015    Procedure: COMBINED ESOPHAGOSCOPY, GASTROSCOPY, DUODENOSCOPY (EGD), REMOVE FOREIGN BODY;  Surgeon: Mandeep Park MD;  [facility]: [facility]     ESOPHAGOSCOPY, GASTROSCOPY, DUODENOSCOPY (EGD), COMBINED N/A 10/25/2015    Procedure: COMBINED ESOPHAGOSCOPY, GASTROSCOPY, DUODENOSCOPY (EGD);  Surgeon: Sammy Amaro MD;  Location: [facility]     ESOPHAGOSCOPY, GASTROSCOPY, DUODENOSCOPY (EGD), COMBINED N/A 10/25/2015    Procedure: COMBINED ESOPHAGOSCOPY, GASTROSCOPY, DUODENOSCOPY (EGD), BIOPSY SINGLE OR MULTIPLE;  Surgeon: Sammy Amaro MD;  Location: [facility]     ESOPHAGOSCOPY, GASTROSCOPY, DUODENOSCOPY (EGD), DILATATION, COMBINED       EXCISE LESION TRUNK N/A 4/17/2017    Procedure: EXCISE LESION TRUNK;  Removal of Abdominal Foreign Body;  Surgeon: Alban  Nestor Raines MD;  [facility]     HC ESOPH/GAS REFLUX TEST W NASAL IMPED >1 HR N/A 11/19/2015    Procedure: ESOPHAGEAL IMPEDENCE FUNCTION TEST WITH 24 HOUR PH GREATER THAN 1 HOUR;  Surgeon: Thiago Apple MD;  Location: [facility]     HC UGI ENDOSCOPY DIAG W BIOPSY  9/17/08     HC UGI ENDOSCOPY DIAG W BIOPSY  9/27/12     HC UGI ENDOSCOPY W ESOPHAGEAL DILATION BALLOON <30MM  9/17/08     HC UGI ENDOSCOPY W EUS N/A 5/5/2015    Procedure: COMBINED ENDOSCOPIC ULTRASOUND, ESOPHAGOSCOPY, GASTROSCOPY, DUODENOSCOPY (EGD);  Surgeon: Wm Dueñas MD;  Location: [facility]     HC WRIST ARTHROSCOP,RELEASE XVERS LIG Bilateral 12/17/08     INJECT TRANSVERSUS ABDOMINIS PLANE (TAP) BLOCK BILATERAL Left 9/22/2016    Procedure: INJECT TRANSVERSUS ABDOMINIS PLANE (TAP) BLOCK BILATERAL;  Surgeon: Dickson Corrigan MD;  [facility]     laparoscopic pineda  1995     LAPAROSCOPIC HERNIORRHAPHY INCISIONAL N/A 8/23/2018    Procedure: LAPAROSCOPIC HERNIORRHAPHY INCISIONAL;  Laparoscopic Incisional Hernia Repair with Symbotex Mesh Implant;  Surgeon: Nestor Phoenix MD;  Location: [facility]     LAPAROSCOPIC PANCREATECTOMY, TRANSPLANT AUTO ISLET CELL N/A 12/28/2016    Procedure: LAPAROSCOPIC PANCREATECTOMY, TRANSPLANT AUTO ISLET CELL;  Surgeon: Nestor Phoenix MD;  Location: [facility]     transphenoidal pituitary resection  1990       ALLERGIES:  Allergies   Allergen Reactions     Apple Anaphylaxis     Corticosteroids Other (See Comments)     All oral, IV and injectable steroids are contraindicated (unless in life threatening situations) in [facility] transplant recipients. They can cause irreversible loss of islet cell function. Please contact patient's transplant care coordinator ADI Gaffney RN at [phone]/pager [phone] and/or endocrinologist prior to administration.       Depakote [Divalproex Sodium] Other (See Comments)     Chest pain     Zithromax [Azithromycin Dihydrate] Anaphylaxis     Noted in 4/7/08 ER     Darvocet  [Propoxyphene N-Apap] Itching     Morphine Nausea and Vomiting and Rash     Nalbuphine Hcl Rash     RASH :nubaine     Zosyn [Piperacillin-Tazobactam In D5w] Rash     Possible allergy, did have a diffuse rash that seemed drug related but could have also been related to soap in the hospital.      Bactrim [Sulfamethoxazole W-Trimethoprim] Other (See Comments) and Nausea and Vomiting     Severely low liver function.     Cats      Compazine [Prochlorperazine] Other (See Comments)     Twitching. Takes Benedryl and is fine     Dust Mites      Grass      Prednisone Other (See Comments)     Insomnia       Ragweeds      Tape [Adhesive Tape] Blisters     Tramadol      Trees      Zofran [Ondansetron] Other (See Comments)     migraine     Flagyl [Metronidazole] Hives and Rash       PERTINENT MEDICATIONS:    Current Outpatient Medications:      acarbose (PRECOSE) 50 MG tablet, Start with 1 pill (50 mg) per day with each meal but can go up to 2 pills (100 mg) per day if needed., Disp: 180 tablet, Rfl: 3     amitriptyline (ELAVIL) 10 MG tablet, Take 5 tablets (50 mg) by mouth At Bedtime Take 40 mg at bedtime, Disp: 150 tablet, Rfl: 11     amylase-lipase-protease (CREON 24) 96912-63021 units CPEP per EC capsule, Take 3-4 capsules by mouth with meals and 1-2 with snacks. Maximum 15 capsules per day., Disp: 450 capsule, Rfl: 11     azelastine (ASTELIN) 0.1 % nasal spray, Spray 1 spray into both nostrils 2 times daily, Disp: 1 Bottle, Rfl: 11     blood glucose (PAT CONTOUR NEXT) test strip, Use to test blood sugar 6 times daily or as directed., Disp: 2 Box, Rfl: 11     blood glucose monitoring (PAT MICROLET) lancets, Use to test blood sugar 6-8 times daily or as directed., Disp: 100 each, Rfl: 11     cetirizine (ZYRTEC) 10 MG tablet, Take 1 tablet (10 mg) by mouth daily, Disp: 90 tablet, Rfl: 3     Continuous Blood Gluc Sensor (FREESTYLE LISA 2 SENSOR) MISC, 1 each every 14 days, Disp: 2 each, Rfl: 11     diphenhydrAMINE (BENADRYL  ALLERGY) 25 MG capsule, Take 1 capsule (25 mg) by mouth every 6 hours as needed for itching or allergies, Disp: 56 capsule, Rfl: 0     estradiol (VAGIFEM) 10 MCG TABS vaginal tablet, Place 1 tablet (10 mcg) vaginally twice a week, Disp: 24 tablet, Rfl: 3     famotidine (PEPCID) 20 MG tablet, Take 1 tablet (20 mg) by mouth 2 times daily, Disp: 180 tablet, Rfl: 3     FOLIC ACID PO, Take 1 mg by mouth daily, Disp: , Rfl:      glucose 40 % GEL gel, Take 15-30 g by mouth every 15 minutes as needed for low blood sugar, Disp: 3 Tube, Rfl: 2     ibuprofen (ADVIL/MOTRIN) 400 MG tablet, Take 1 tablet (400 mg) by mouth every 6 hours as needed for moderate pain, Disp: 30 tablet, Rfl: 0     levothyroxine (SYNTHROID) 137 MCG tablet, Take 1 tablet (137 mcg) by mouth daily, Disp: 90 tablet, Rfl: 3     montelukast (SINGULAIR) 10 MG tablet, TAKE 1 TABLET(10 MG) BY MOUTH AT BEDTIME, Disp: 90 tablet, Rfl: 3     multivitamin CF formula (CHOICEFUL) capsule, Take 1 tablet by mouth daily, Disp: , Rfl: 11     omeprazole (PRILOSEC) 40 MG DR capsule, Take 1 capsule (40 mg) by mouth 2 times daily, Disp: 180 capsule, Rfl: 3     order for DME, Equipment being ordered:Cefaly, Disp: 1 Device, Rfl: 0     prochlorperazine (COMPAZINE) 5 MG tablet, Take two 5 mg tablets by mouth every six hours as needed for nausea., Disp: 90 tablet, Rfl: 3     promethazine (PHENERGAN) 25 MG tablet, Take 1 tablet (25 mg) by mouth At Bedtime, Disp: 90 tablet, Rfl: 3     Prucalopride Succinate (MOTEGRITY) 2 MG TABS, Take 2 mg by mouth daily, Disp: 30 tablet, Rfl: 11     senna-docusate (SENOKOT-S;PERICOLACE) 8.6-50 MG per tablet, Take 1-2 tablets by mouth 2 times daily, Disp: 100 tablet, Rfl: 0     Sharps Container (BD SHARPS ) MISC, 1 Container as needed, Disp: 1 each, Rfl: 1     ZOLMitriptan (ZOMIG) 5 MG tablet, Take 1 tablet (5 mg) by mouth at onset of headache for migraine, Disp: 9 tablet, Rfl: 3    SOCIAL HISTORY:  Social History     Socioeconomic History      Marital status:      Spouse name: Norris     Number of children: 3     Years of education: 16     Highest education level: Not on file   Occupational History     Occupation: director     Employer: [employer]   Social Needs     Financial resource strain: Not on file     Food insecurity     Worry: Not on file     Inability: Not on file     Transportation needs     Medical: Not on file     Non-medical: Not on file   Tobacco Use     Smoking status: Former Smoker     Packs/day: 0.50     Years: 6.00     Pack years: 3.00     Types: Cigarettes     Start date: 1985     Quit date: 1992     Years since quittin.5     Smokeless tobacco: Never Used     Tobacco comment: no 2nd hand   Substance and Sexual Activity     Alcohol use: Yes     Alcohol/week: 3.0 - 6.0 standard drinks     Types: 1 - 2 Glasses of wine, 1 - 2 Cans of beer, 1 - 2 Shots of liquor per week     Comment: occasional     Drug use: No     Sexual activity: Yes     Partners: Male     Birth control/protection: None     Comment: Hystectomy 1999   Lifestyle     Physical activity     Days per week: Not on file     Minutes per session: Not on file     Stress: Not on file   Relationships     Social connections     Talks on phone: Not on file     Gets together: Not on file     Attends Jainism service: Not on file     Active member of club or organization: Not on file     Attends meetings of clubs or organizations: Not on file     Relationship status: Not on file     Intimate partner violence     Fear of current or ex partner: Not on file     Emotionally abused: Not on file     Physically abused: Not on file     Forced sexual activity: Not on file   Other Topics Concern     Parent/sibling w/ CABG, MI or angioplasty before 65F 55M? No      Service Not Asked     Blood Transfusions No     Caffeine Concern Not Asked     Occupational Exposure Not Asked     Hobby Hazards Not Asked     Sleep Concern Not Asked     Stress Concern Not Asked     Weight  Concern Not Asked     Special Diet Not Asked     Back Care Not Asked     Exercise Not Asked     Bike Helmet Not Asked     Seat Belt Yes     Self-Exams Not Asked   Social History Narrative     with 3 children and a dog.  No smoking, etoh or drug use.  Worked as a  for [employer] in [place] in the past.  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       FAMILY HISTORY:  Family History   Problem Relation Age of Onset     Eye Disorder Father         cataract, detached retina     Myocardial Infarction Father 60     Lipids Father      Cerebrovascular Disease Father      Depression Father      Substance Abuse Father      Anesthesia Reaction Father         stroke right after surgery     Cataracts Father      Osteoarthritis Father      Ulcerative Colitis Father      Lipids Mother      Hypertension Mother      Thyroid Disease Mother      Depression Mother      Angina Mother      GERD Mother      Skin Cancer Mother      Eye Disorder Son         ptosis     Eye Disorder Paternal Grandmother         cataract     Eye Disorder Paternal Grandfather         cataract     Diabetes Paternal Grandfather      Eye Disorder Maternal Grandmother         cataract     Thyroid Disease Maternal Grandmother      Coronary Artery Disease Sister         angioplasty     GERD Sister      Substance Abuse Sister      Depression Sister      Depression Son      Anxiety Disorder Son      Thyroid Disease Sister      Diabetes Maternal Grandfather      Depression Nephew      Anxiety Disorder Nephew      Thyroid Disease Nephew      Diabetes Type 2  Cousin         paternal cousin     Heart Disease Paternal Aunt      Diabetes Paternal Aunt      Diabetes Paternal Uncle      Heart Disease Paternal Uncle      Past/family/social history reviewed and no changes    PHYSICAL EXAMINATION:  General appearance: Healthy appearing adult,  Eyes: Sclera anicteric, Pupils round and reactive to light  Ears, nose, mouth and  throat: No obvious external lesions of ears and nose, Hearing intact  Neck: Symmetric, No obvious external lesions  Respiratory: Normal respiration, no use of accessory muscles   Skin: No rashes or jaundice   Psychiatric: Oriented to person, place and time    PERTINENT STUDIES:  Orders Only on 09/13/2018   Component Date Value Ref Range Status     WBC 09/13/2018 5.8  4.0 - 11.0 10e9/L Final     RBC Count 09/13/2018 4.51  3.8 - 5.2 10e12/L Final     Hemoglobin 09/13/2018 14.1  11.7 - 15.7 g/dL Final     Hematocrit 09/13/2018 42.1  35.0 - 47.0 % Final     MCV 09/13/2018 93  78 - 100 fl Final     MCH 09/13/2018 31.3  26.5 - 33.0 pg Final     MCHC 09/13/2018 33.5  31.5 - 36.5 g/dL Final     RDW 09/13/2018 13.2  10.0 - 15.0 % Final     Platelet Count 09/13/2018 430  150 - 450 10e9/L Final     Diff Method 09/13/2018 Automated Method   Final     % Neutrophils 09/13/2018 37.1  % Final     % Lymphocytes 09/13/2018 40.5  % Final     % Monocytes 09/13/2018 10.5  % Final     % Eosinophils 09/13/2018 9.8  % Final     % Basophils 09/13/2018 2.1  % Final     % Immature Granulocytes 09/13/2018 0.0  % Final     Nucleated RBCs 09/13/2018 0  0 /100 Final     Absolute Neutrophil 09/13/2018 2.2  1.6 - 8.3 10e9/L Final     Absolute Lymphocytes 09/13/2018 2.4  0.8 - 5.3 10e9/L Final     Absolute Monocytes 09/13/2018 0.6  0.0 - 1.3 10e9/L Final     Absolute Eosinophils 09/13/2018 0.6  0.0 - 0.7 10e9/L Final     Absolute Basophils 09/13/2018 0.1  0.0 - 0.2 10e9/L Final     Abs Immature Granulocytes 09/13/2018 0.0  0 - 0.4 10e9/L Final     Absolute Nucleated RBC 09/13/2018 0.0   Final     Sodium 09/13/2018 138  133 - 144 mmol/L Final     Potassium 09/13/2018 4.3  3.4 - 5.3 mmol/L Final     Chloride 09/13/2018 102  94 - 109 mmol/L Final     Carbon Dioxide 09/13/2018 30  20 - 32 mmol/L Final     Anion Gap 09/13/2018 5  3 - 14 mmol/L Final     Glucose 09/13/2018 119* 70 - 99 mg/dL Final     Urea Nitrogen 09/13/2018 12  7 - 30 mg/dL Final      Creatinine 09/13/2018 0.83  0.52 - 1.04 mg/dL Final     GFR Estimate 09/13/2018 72  >60 mL/min/1.7m2 Final     GFR Estimate If Black 09/13/2018 87  >60 mL/min/1.7m2 Final     Calcium 09/13/2018 9.2  8.5 - 10.1 mg/dL Final     Bilirubin Direct 09/13/2018 0.2  0.0 - 0.2 mg/dL Final     Bilirubin Total 09/13/2018 0.8  0.2 - 1.3 mg/dL Final     Albumin 09/13/2018 3.9  3.4 - 5.0 g/dL Final     Protein Total 09/13/2018 7.8  6.8 - 8.8 g/dL Final     Alkaline Phosphatase 09/13/2018 145  40 - 150 U/L Final     ALT 09/13/2018 59* 0 - 50 U/L Final     AST 09/13/2018 39  0 - 45 U/L Final     IGG 09/13/2018 1160  695 - 1620 mg/dL Final     IgG1 09/13/2018 416  300 - 856 mg/dL Final     IgG2 09/13/2018 384  158 - 761 mg/dL Final     IgG3 09/13/2018 83  24 - 192 mg/dL Final     IgG4 09/13/2018 95* 11 - 86 mg/dL Final

## 2021-06-28 NOTE — PATIENT INSTRUCTIONS
It was a pleasure taking care of you today.  I've included a brief summary of our discussion and care plan from today's visit below.  Please review this information with your primary care provider.  ______________________________________________________________________    My recommendations are summarized as follows:    --Continue Mote[employer] as you have been.  Let us know if side effects persist  --Continue with gastroparesis diet, switch to all liquids if symptoms worsen  --Try some magnesium oxide at bedtime to see if this helps any residual constipation    Return to GI Clinic in August months to review your progress.    ______________________________________________________________________    How do I schedule labs, imaging studies, or procedures that were ordered in clinic today?     Labs: To schedule lab appointment at the [facility], use my chart or call [phone]. If you have a [place] lab closer to home where you are regularly seen you can give them a call.     Procedures: If a colonoscopy, upper endoscopy, breath test, esophageal manometry, or pH impedence was ordered today, our endoscopy team will call you to schedule this. If you have not heard from our endoscopy team within a week, please call [phone] to schedule.     Imaging Studies: If you were scheduled for a CT scan, X-ray, MRI, ultrasound, HIDA scan or other imaging study, please call [phone] to have this scheduled.     Referral: If a referral to another specialty was ordered, expect a phone call or follow instructions above. If you have not heard from anyone regarding your referral in a week, please call our clinic to check the status.     Who do I call with any questions after my visit?  Please be in touch if there are any further questions that arise following today's visit.  There are multiple ways to contact your gastroenterology care team.        During business hours, you may reach a Gastroenterology nurse at  [phone]      To schedule or reschedule an appointment, please call [phone].       You can always send a secure message through iloho.  iloho messages are answered by your nurse or doctor typically within 24 hours.  Please allow extra time on weekends and holidays.        For urgent/emergent questions after business hours, you may reach the on-call GI Fellow by contacting the [facility]  at [phone].     How will I get the results of any tests ordered?    You will receive all of your results.  If you have signed up for [employer], any tests ordered at your visit will be available to you after your physician reviews them.  Typically this takes 1-2 weeks.  If there are urgent results that require a change in your care plan, your physician or nurse will call you to discuss the next steps.      What is iloho?  [employer] is a secure way for you to access all of your healthcare records from the [facility].  It is a web based computer program, so you can sign on to it from any location.  It also allows you to send secure messages to your care team.  I recommend signing up for iloho access if you have not already done so and are comfortable with using a computer.      How to I schedule a follow-up visit?  If you did not schedule a follow-up visit today, please call [phone] to schedule a follow-up office visit.      Sincerely,    Rosanne Marti PA-C  Division of Gastroenterology, Hepatology & Nutrition  [facility]

## 2021-06-28 NOTE — NURSING NOTE
Chief Complaint   Patient presents with     Follow Up       Vitals:    06/28/21 1222   Weight: 65.3 kg (144 lb)       Body mass index is 21.9 kg/m .    Saundra Jaquez CMA

## 2021-06-28 NOTE — LETTER
"    6/28/2021         RE: Dinora Mcghee  [address] [facility] [address]        Dear Colleague,    Thank you for referring your patient, Dinora Mcghee, to the [facility]STRO[facility]OLOG[facility] [place]. Please see a copy of my visit note below.    Dinora Mcghee is a 56 year old female who is being evaluated via a billable video visit.      The patient has been notified of following:     \"This video visit will be conducted via a call between you and your physician/provider. We have found that certain health care needs can be provided without the need for an in-person physical exam.  This service lets us provide the care you need with a video conversation.  If a prescription is necessary we can send it directly to your pharmacy.  If lab work is needed we can place an order for that and you can then stop by our lab to have the test done at a later time.    If during the course of the call the physician/provider feels a video visit is not appropriate, you will not be charged for this service.\"     Patient confirmed that they are in [place] for today's visit yes.    Video-Visit Details  Type of service:  Video Visit    Video Start Time: 1:02 PM  Video End Time:  1:37 PM    Originating Location (pt. 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     1.  Abdominal pain, " nausea/vomiting, constipation  Patient reports worsening abdominal pain, nausea and vomiting starting in November 2020.  Of note, had been working on a more vegan diet around the time.  Was evaluated in the emergency department with unremarkable CT scan and lab work-up.  Then had small bowel follow-through without evidence of stricture or obstruction.  Patient met with our GI dietitian to discuss the gastroparesis diet which initially did seem to make a big improvement in her symptoms, though they did not return to baseline.  She finds the gastroparesis diet very difficult in the setting of diabetes, and living to cook.  In regards to motility medications, we are very limited because she has had reactions to Reglan.  Has discussed other options including BuSpar and erythromycin with our pharmacist-we will consider pending clinical picture.    Patient reported that debilitating bloating was her most bothersome symptom.  She had previously benefited with a course of rifaximin therefore this was repeated without any improvement in her symptoms    In regards to her constipation, patient has tried multiple medications including Linzess and Trulance which resulted in severe loose stools.  Was previously maintaining on Amitiza over max dose, however this recently does not seem to be controlling her symptoms.  She was recently started on Motegrity, hard to tell at this point if her symptoms are improved.  We discussed a plan for her to use some magnesium oxide for the next couple of days.  Will monitor headaches.  We again discussed some Motegrity including mood changes and she was instructed to contact us if she notices any different changes.  --Continue Motegrity as you have been.  Let us know if side effects persist  --Continue with gastroparesis diet, switch to all liquids if symptoms worsen  --Try some magnesium oxide at bedtime to see if this helps any residual constipation    RTC in August    46 Minutes was spent on  the date of the encounter during chart review, history and exam, documentation, and further activities as noted     Note completed using voice recognition software. Some word and grammatical errors may occur.    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     History summarized from previous documentation from Dr. Genao and JADEN. Please see previous notes for further details     Gastroparesis  Gastric emptying study with 2-hour study at [facility] have been consistent with gastroparesis, patient had follow-up study here 6/27/2016 with 49% remaining at 4 hours however the patient was not aware she was on narcotics at the time.  Had GJ tubes in fall 2016, NG tube used for venting with tube feeds and was able to wean off of tube feeds after TPAIT.  At initial visit she reported severe migraine headaches associated with vomiting and is on amitriptyline for migraine prophylaxis.  Patient has used several medication for the symptoms including scopolamine patch, prochlorperazine, promethazine which helped symptoms.  She has also been seen by neurology and psychology due to concern that she has a difficult time sleeping and with migraines.  At previous visits, reported nausea with occasional episodes of vomiting.  Her regimen was previously well managed with Compazine in the morning, Phenergan at night.  She did note that nausea and vomiting worsened with migraines.    Patient reports that she had been feeling pretty well up until  early November.  Early November the patient noted worsening symptoms.  He was seen at the [facility] emergency department on November 18.  At that time, blood work notable for normal BMP, low lipase, unremarkable hepatic panel.  CBC without elevation of WBCs though she did have elevated platelets, neutrophils and lymphocytes.  CT at that time with moderate stool burden, nonspecific fluid in small intestine.  No evidence of obstruction per documentation. Had similar episodes after this. This had improved with juicing foods.  States that she has 6-7 small meals a day with about a cup of food at each meal.  States that foods such as toast, bread, gluten seem to go better than healthier foods.  Is having a difficult time balancing foods that she enjoys eating, foods that cause symptoms and foods that are healthy.  Will have nausea vomiting and pain with food triggers.  Of note, patient reported increased stress in the setting of a son who is struggling with his own mental health.  At her visits this spring, she reported that symptoms did seem to be improved with strict dietary measures.     Constipation/irregular bowel movements/bloating  Patient reports history of ongoing constipation for over 20 years after having her first child.  She has tried multiple interventions including a bowel cleanout, MiraLAX, senna, milk of magnesium, Linzess, and Amitiza for which she was on when she first came to [facility] and  GI clinic.  Patient also takes creon.  Patient has done a course of rifaximin in the past with with improvement in bloating and stool consistency.  Patient has also been on specific SIBO/FODMAP diets to the [facility] while avoiding certain food triggers.  Previous pattern would be bowel movements with certain days where she would clear out with multiple bowel movements in 1 day    GERD, Dysphagia   Summarized/taken from prior documentation   Patient experiences GERD as substernal and throat burning with  nocturnal relaxant causing choking. She had previously followed with Dr. George Flores in out clinic and reports a prior Schatzki's ring requiring previous dilation. Manometry was noral, and pH impedence had a DeMeester of 24 with positive symptoms association. Patient has treated GERD symptoms with BID PPI, Carafate, H2 blocker, and tums. At her last visit, she had been experiencing heartburn and has restarted omeprazole 40 mg daily with continued symptoms with specific food trigger.  Had also previously been on Zantac up to 300 mg a day.    Interaval History 6/28/2021:  Given ongoing symptoms this spring and concern that constipation was contributing to her symptoms, we hoped to try Motegrity which was initially not covered by her insurance.  She tried Trulance first and had uncontrollable diarrhea.  She reports that this significantly impacted her mental health, questioned her desire to live with these debilitating symptoms.  Continue to report severe bloating.  After her last visit, we did a trial of rifaximin for 2 weeks.  This did not worsen her symptoms, but also did not improve her symptoms.  Motegrity was then covered by her insurance which she started around June 11.  Has met with our pharmacist to discuss this and other options moving forward.  Most bothersome symptom since starting Motegrity is a headache.  Within this past week, her symptoms do seem to be slightly improved.  She also took a slightly higher dose of the amitriptyline with 50 mg as opposed to 40 mg which seemed to improve her symptoms.  She states it is too early to tell at this point of Motegrity is improving her symptoms more than Amitiza.    Does report some continued gas and increased flatus.  This kept her up last week, she also reported abdominal pain which radiated to her back.  This has been improved.    Has been taking ensure, yogurt (greek), fruit. Bananas go ok, cantaloupe, white toast. Last week she pretty much did liquids.   Potato soup worsened symptoms.     Stool consistency: Really long bowel movements, feels a little light-headed. Not hard, long stool. 1 today, 2 yesterday.    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  Document previously scanned on 1/12/17.  Validation form completed and scanned.  Code Status reflects choices in most recent ACP document..  Confirmed/documented designated decision maker(s).  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 Provider to review and confirm.       GERD (gastroesophageal reflux disease) 12/01/2010     Priority: Medium     Lumbago 04/18/2005     Priority: Medium     History of L5-S1 degenerative disk disease.         Sensorineural hearing loss 01/10/2005     Priority: Medium     Problem list name updated by automated process. Provider to  review       Vertiginous syndrome and labyrinthine disorder 01/10/2005     Priority: Medium     Problem list name updated by automated process. Provider to review  IMO Regulatory Load OCT 2020       Sprain and strain of other specified sites of hip and thigh 2002     Priority: Medium     Chondromalacia of patella 2002     Priority: Medium     Need for prophylactic immunotherapy      Priority: Medium     trees, grass, srw, dust mites, cat       Allergic rhinitis due to other allergen 2001     Priority: Medium     Hypothyroidism      Priority: Medium     Problem list name updated by automated process. Provider to review         PERTINENT PAST MEDICAL HISTORY:  Past Medical History:   Diagnosis Date     Allergic rhinitis, cause unspecified      Allergy to other foods     strawberries, apples, celeries, alice, watermelon     Arthritis     left knee     Choledocholithiasis     long after cholecystectomy     Chronic abdominal pain      Chronic constipation      Chronic nausea      Chronic pancreatitis (H)      Degeneration of lumbar or lumbosacral intervertebral disc      Esophageal reflux     w/ hiatal hernia     Gastroparesis      Hiatal hernia      History of pituitary adenoma     s/p resection     Hypothyroidism      Migraines      Mild hyperlipidemia      On tube feeding diet     presence of GJ tube     Pancreatic disease     PD stricture, 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 endometriosis growth     APPENDECTOMY       C  DELIVERY ONLY       C  DELIVERY ONLY      repeat c section with incidental cystotomy with repair     C EXCIS PITUITARY,TRANSNASAL/SEPTAL      pituitary tumor removed for diabetes  insipidus     C TOTAL ABDOM HYSTERECTOMY      w/ bilateral salpingoophorectomy       SECTION       COLONOSCOPY       ENDOSCOPIC RETROGRADE CHOLANGIOPANCREATOGRAM N/A 2015    Procedure: ENDOSCOPIC RETROGRADE CHOLANGIOPANCREATOGRAM;  Surgeon: Mandeep Park MD;  [facility]: [facility]     ENDOSCOPIC RETROGRADE CHOLANGIOPANCREATOGRAM N/A 2016    Procedure: COMBINED ENDOSCOPIC RETROGRADE CHOLANGIOPANCREATOGRAPHY, PLACE TUBE/STENT;  Surgeon: Mandeep Park MD;  [facility]     ENDOSCOPIC RETROGRADE CHOLANGIOPANCREATOGRAM N/A 3/17/2016    Procedure: COMBINED ENDOSCOPIC RETROGRADE CHOLANGIOPANCREATOGRAPHY, REMOVE FOREIGN BODY OR STENT/TUBE;  Surgeon: Mandeep Park MD;  [facility]: [facility]     ENDOSCOPIC RETROGRADE CHOLANGIOPANCREATOGRAM N/A 2016    Procedure: COMBINED ENDOSCOPIC RETROGRADE CHOLANGIOPANCREATOGRAPHY, PLACE TUBE/STENT;  Surgeon: Mandeep Park MD;  [facility]     ENDOSCOPIC RETROGRADE CHOLANGIOPANCREATOGRAM N/A 2016    Procedure: COMBINED ENDOSCOPIC RETROGRADE CHOLANGIOPANCREATOGRAPHY, REMOVE FOREIGN BODY OR STENT/TUBE;  Surgeon: Mandeep Park MD;  [facility]     ENDOSCOPIC ULTRASOUND UPPER GASTROINTESTINAL TRACT (GI) N/A 10/3/2016    Procedure: ENDOSCOPIC ULTRASOUND, ESOPHAGOSCOPY / UPPER GASTROINTESTINAL TRACT (GI);  Surgeon: Guru Jose Rodas MD;  Location: [facility]     ESOPHAGOSCOPY, GASTROSCOPY, DUODENOSCOPY (EGD), COMBINED N/A 2015    Procedure: COMBINED ESOPHAGOSCOPY, GASTROSCOPY, DUODENOSCOPY (EGD), REMOVE FOREIGN BODY;  Surgeon: Mandeep Park MD;  [facility]     ESOPHAGOSCOPY, GASTROSCOPY, DUODENOSCOPY (EGD), COMBINED N/A 10/25/2015    Procedure: COMBINED ESOPHAGOSCOPY, GASTROSCOPY, DUODENOSCOPY (EGD);  Surgeon: Sammy Amaro MD;  [facility]     ESOPHAGOSCOPY, GASTROSCOPY, DUODENOSCOPY (EGD), COMBINED N/A 10/25/2015    Procedure: COMBINED ESOPHAGOSCOPY, GASTROSCOPY,  DUODENOSCOPY (EGD), BIOPSY SINGLE OR MULTIPLE;  Surgeon: Sammy Amaro MD;  Location: [facility]     ESOPHAGOSCOPY, GASTROSCOPY, DUODENOSCOPY (EGD), DILATATION, COMBINED       EXCISE LESION TRUNK N/A 4/17/2017    Procedure: EXCISE LESION TRUNK;  Removal of Abdominal Foreign Body;  Surgeon: Nestor Phoenix MD;  [facility]     HC ESOPH/GAS REFLUX TEST W NASAL IMPED >1 HR N/A 11/19/2015    Procedure: ESOPHAGEAL IMPEDENCE FUNCTION TEST WITH 24 HOUR PH GREATER THAN 1 HOUR;  Surgeon: Thiago Apple MD;  Location: [facility] GI     HC UGI ENDOSCOPY DIAG W BIOPSY  9/17/08     HC UGI ENDOSCOPY DIAG W BIOPSY  9/27/12     HC UGI ENDOSCOPY W ESOPHAGEAL DILATION BALLOON <30MM  9/17/08     HC UGI ENDOSCOPY W EUS N/A 5/5/2015    Procedure: COMBINED ENDOSCOPIC ULTRASOUND, ESOPHAGOSCOPY, GASTROSCOPY, DUODENOSCOPY (EGD);  Surgeon: Wm Dueñas MD;  Location: [facility]     HC WRIST ARTHROSCOP,RELEASE XVERS LIG Bilateral 12/17/08     INJECT TRANSVERSUS ABDOMINIS PLANE (TAP) BLOCK BILATERAL Left 9/22/2016    Procedure: INJECT TRANSVERSUS ABDOMINIS PLANE (TAP) BLOCK BILATERAL;  Surgeon: Dickson Corrigan MD;  [facility]     laparoscopic pineda  1995     LAPAROSCOPIC HERNIORRHAPHY INCISIONAL N/A 8/23/2018    Procedure: LAPAROSCOPIC HERNIORRHAPHY INCISIONAL;  Laparoscopic Incisional Hernia Repair with Symbotex Mesh Implant;  Surgeon: Nestor Phoenix MD;  [facility]: [facility]     LAPAROSCOPIC PANCREATECTOMY, TRANSPLANT AUTO ISLET CELL N/A 12/28/2016    Procedure: LAPAROSCOPIC PANCREATECTOMY, TRANSPLANT AUTO ISLET CELL;  Surgeon: Nestor Phoenix MD;  Location: [facility]     transphenoidal pituitary resection  1990       ALLERGIES:  Allergies   Allergen Reactions     Apple Anaphylaxis     Corticosteroids Other (See Comments)     All oral, IV and injectable steroids are contraindicated (unless in life threatening situations) in [facility] transplant recipients. They can cause irreversible loss of islet cell function. Please contact  patient's transplant care coordinator ADI Gaffney RN at [phone]/pager [phone] and/or endocrinologist prior to administration.       Depakote [Divalproex Sodium] Other (See Comments)     Chest pain     Zithromax [Azithromycin Dihydrate] Anaphylaxis     Noted in 4/7/08 ER     Darvocet [Propoxyphene N-Apap] Itching     Morphine Nausea and Vomiting and Rash     Nalbuphine Hcl Rash     RASH :nubaine     Zosyn [Piperacillin-Tazobactam In D5w] Rash     Possible allergy, did have a diffuse rash that seemed drug related but could have also been related to soap in the hospital.      Bactrim [Sulfamethoxazole W-Trimethoprim] Other (See Comments) and Nausea and Vomiting     Severely low liver function.     Cats      Compazine [Prochlorperazine] Other (See Comments)     Twitching. Takes Benedryl and is fine     Dust Mites      Grass      Prednisone Other (See Comments)     Insomnia       Ragweeds      Tape [Adhesive Tape] Blisters     Tramadol      Trees      Zofran [Ondansetron] Other (See Comments)     migraine     Flagyl [Metronidazole] Hives and Rash       PERTINENT MEDICATIONS:    Current Outpatient Medications:      acarbose (PRECOSE) 50 MG tablet, Start with 1 pill (50 mg) per day with each meal but can go up to 2 pills (100 mg) per day if needed., Disp: 180 tablet, Rfl: 3     amitriptyline (ELAVIL) 10 MG tablet, Take 5 tablets (50 mg) by mouth At Bedtime Take 40 mg at bedtime, Disp: 150 tablet, Rfl: 11     amylase-lipase-protease (CREON 24) 22243-86173 units CPEP per EC capsule, Take 3-4 capsules by mouth with meals and 1-2 with snacks. Maximum 15 capsules per day., Disp: 450 capsule, Rfl: 11     azelastine (ASTELIN) 0.1 % nasal spray, Spray 1 spray into both nostrils 2 times daily, Disp: 1 Bottle, Rfl: 11     blood glucose (PAT CONTOUR NEXT) test strip, Use to test blood sugar 6 times daily or as directed., Disp: 2 Box, Rfl: 11     blood glucose monitoring (PAT MICROLET) lancets, Use to test blood sugar  6-8 times daily or as directed., Disp: 100 each, Rfl: 11     cetirizine (ZYRTEC) 10 MG tablet, Take 1 tablet (10 mg) by mouth daily, Disp: 90 tablet, Rfl: 3     Continuous Blood Gluc Sensor (FREESTYLE LISA 2 SENSOR) MISC, 1 each every 14 days, Disp: 2 each, Rfl: 11     diphenhydrAMINE (BENADRYL ALLERGY) 25 MG capsule, Take 1 capsule (25 mg) by mouth every 6 hours as needed for itching or allergies, Disp: 56 capsule, Rfl: 0     estradiol (VAGIFEM) 10 MCG TABS vaginal tablet, Place 1 tablet (10 mcg) vaginally twice a week, Disp: 24 tablet, Rfl: 3     famotidine (PEPCID) 20 MG tablet, Take 1 tablet (20 mg) by mouth 2 times daily, Disp: 180 tablet, Rfl: 3     FOLIC ACID PO, Take 1 mg by mouth daily, Disp: , Rfl:      glucose 40 % GEL gel, Take 15-30 g by mouth every 15 minutes as needed for low blood sugar, Disp: 3 Tube, Rfl: 2     ibuprofen (ADVIL/MOTRIN) 400 MG tablet, Take 1 tablet (400 mg) by mouth every 6 hours as needed for moderate pain, Disp: 30 tablet, Rfl: 0     levothyroxine (SYNTHROID) 137 MCG tablet, Take 1 tablet (137 mcg) by mouth daily, Disp: 90 tablet, Rfl: 3     montelukast (SINGULAIR) 10 MG tablet, TAKE 1 TABLET(10 MG) BY MOUTH AT BEDTIME, Disp: 90 tablet, Rfl: 3     multivitamin CF formula (CHOICEFUL) capsule, Take 1 tablet by mouth daily, Disp: , Rfl: 11     omeprazole (PRILOSEC) 40 MG DR capsule, Take 1 capsule (40 mg) by mouth 2 times daily, Disp: 180 capsule, Rfl: 3     order for DME, Equipment being ordered:Cefaly, Disp: 1 Device, Rfl: 0     prochlorperazine (COMPAZINE) 5 MG tablet, Take two 5 mg tablets by mouth every six hours as needed for nausea., Disp: 90 tablet, Rfl: 3     promethazine (PHENERGAN) 25 MG tablet, Take 1 tablet (25 mg) by mouth At Bedtime, Disp: 90 tablet, Rfl: 3     Prucalopride Succinate (MOTEGRITY) 2 MG TABS, Take 2 mg by mouth daily, Disp: 30 tablet, Rfl: 11     senna-docusate (SENOKOT-S;PERICOLACE) 8.6-50 MG per tablet, Take 1-2 tablets by mouth 2 times daily, Disp: 100  tablet, Rfl: 0     Sharps Container (BD SHARPS ) MISC, 1 Container as needed, Disp: 1 each, Rfl: 1     ZOLMitriptan (ZOMIG) 5 MG tablet, Take 1 tablet (5 mg) by mouth at onset of headache for migraine, Disp: 9 tablet, Rfl: 3    SOCIAL HISTORY:  Social History     Socioeconomic History     Marital status:      Spouse name: Norris     Number of children: 3     Years of education: 16     Highest education level: Not on file   Occupational History     Occupation: director     Employer: [employer]   Social Needs     Financial resource strain: Not on file     Food insecurity     Worry: Not on file     Inability: Not on file     Transportation needs     Medical: Not on file     Non-medical: Not on file   Tobacco Use     Smoking status: Former Smoker     Packs/day: 0.50     Years: 6.00     Pack years: 3.00     Types: Cigarettes     Start date: 1985     Quit date: 1992     Years since quittin.5     Smokeless tobacco: Never Used     Tobacco comment: no 2nd hand   Substance and Sexual Activity     Alcohol use: Yes     Alcohol/week: 3.0 - 6.0 standard drinks     Types: 1 - 2 Glasses of wine, 1 - 2 Cans of beer, 1 - 2 Shots of liquor per week     Comment: occasional     Drug use: No     Sexual activity: Yes     Partners: Male     Birth control/protection: None     Comment: Hystectomy 1999   Lifestyle     Physical activity     Days per week: Not on file     Minutes per session: Not on file     Stress: Not on file   Relationships     Social connections     Talks on phone: Not on file     Gets together: Not on file     Attends Hoahaoism service: Not on file     Active member of club or organization: Not on file     Attends meetings of clubs or organizations: Not on file     Relationship status: Not on file     Intimate partner violence     Fear of current or ex partner: Not on file     Emotionally abused: Not on file     Physically abused: Not on file     Forced sexual activity: Not on file   Other Topics  Concern     Parent/sibling w/ CABG, MI or angioplasty before 65F 55M? No      Service Not Asked     Blood Transfusions No     Caffeine Concern Not Asked     Occupational Exposure Not Asked     Hobby Hazards Not Asked     Sleep Concern Not Asked     Stress Concern Not Asked     Weight Concern Not Asked     Special Diet Not Asked     Back Care Not Asked     Exercise Not Asked     Bike Helmet Not Asked     Seat Belt Yes     Self-Exams Not Asked   Social History Narrative     with 3 children and a dog.  No smoking, etoh or drug use.  Worked as a  for [employer] in [employer] in the past.  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       FAMILY HISTORY:  Family History   Problem Relation Age of Onset     Eye Disorder Father         cataract, detached retina     Myocardial Infarction Father 60     Lipids Father      Cerebrovascular Disease Father      Depression Father      Substance Abuse Father      Anesthesia Reaction Father         stroke right after surgery     Cataracts Father      Osteoarthritis Father      Ulcerative Colitis Father      Lipids Mother      Hypertension Mother      Thyroid Disease Mother      Depression Mother      Angina Mother      GERD Mother      Skin Cancer Mother      Eye Disorder Son         ptosis     Eye Disorder Paternal Grandmother         cataract     Eye Disorder Paternal Grandfather         cataract     Diabetes Paternal Grandfather      Eye Disorder Maternal Grandmother         cataract     Thyroid Disease Maternal Grandmother      Coronary Artery Disease Sister         angioplasty     GERD Sister      Substance Abuse Sister      Depression Sister      Depression Son      Anxiety Disorder Son      Thyroid Disease Sister      Diabetes Maternal Grandfather      Depression Nephew      Anxiety Disorder Nephew      Thyroid Disease Nephew      Diabetes Type 2  Cousin         paternal cousin     Heart Disease Paternal Aunt       Diabetes Paternal Aunt      Diabetes Paternal Uncle      Heart Disease Paternal Uncle      Past/family/social history reviewed and no changes    PHYSICAL EXAMINATION:  General appearance: Healthy appearing adult,  Eyes: Sclera anicteric, Pupils round and reactive to light  Ears, nose, mouth and throat: No obvious external lesions of ears and nose, Hearing intact  Neck: Symmetric, No obvious external lesions  Respiratory: Normal respiration, no use of accessory muscles   Skin: No rashes or jaundice   Psychiatric: Oriented to person, place and time    PERTINENT STUDIES:  Orders Only on 09/13/2018   Component Date Value Ref Range Status     WBC 09/13/2018 5.8  4.0 - 11.0 10e9/L Final     RBC Count 09/13/2018 4.51  3.8 - 5.2 10e12/L Final     Hemoglobin 09/13/2018 14.1  11.7 - 15.7 g/dL Final     Hematocrit 09/13/2018 42.1  35.0 - 47.0 % Final     MCV 09/13/2018 93  78 - 100 fl Final     MCH 09/13/2018 31.3  26.5 - 33.0 pg Final     MCHC 09/13/2018 33.5  31.5 - 36.5 g/dL Final     RDW 09/13/2018 13.2  10.0 - 15.0 % Final     Platelet Count 09/13/2018 430  150 - 450 10e9/L Final     Diff Method 09/13/2018 Automated Method   Final     % Neutrophils 09/13/2018 37.1  % Final     % Lymphocytes 09/13/2018 40.5  % Final     % Monocytes 09/13/2018 10.5  % Final     % Eosinophils 09/13/2018 9.8  % Final     % Basophils 09/13/2018 2.1  % Final     % Immature Granulocytes 09/13/2018 0.0  % Final     Nucleated RBCs 09/13/2018 0  0 /100 Final     Absolute Neutrophil 09/13/2018 2.2  1.6 - 8.3 10e9/L Final     Absolute Lymphocytes 09/13/2018 2.4  0.8 - 5.3 10e9/L Final     Absolute Monocytes 09/13/2018 0.6  0.0 - 1.3 10e9/L Final     Absolute Eosinophils 09/13/2018 0.6  0.0 - 0.7 10e9/L Final     Absolute Basophils 09/13/2018 0.1  0.0 - 0.2 10e9/L Final     Abs Immature Granulocytes 09/13/2018 0.0  0 - 0.4 10e9/L Final     Absolute Nucleated RBC 09/13/2018 0.0   Final     Sodium 09/13/2018 138  133 - 144 mmol/L Final     Potassium  09/13/2018 4.3  3.4 - 5.3 mmol/L Final     Chloride 09/13/2018 102  94 - 109 mmol/L Final     Carbon Dioxide 09/13/2018 30  20 - 32 mmol/L Final     Anion Gap 09/13/2018 5  3 - 14 mmol/L Final     Glucose 09/13/2018 119* 70 - 99 mg/dL Final     Urea Nitrogen 09/13/2018 12  7 - 30 mg/dL Final     Creatinine 09/13/2018 0.83  0.52 - 1.04 mg/dL Final     GFR Estimate 09/13/2018 72  >60 mL/min/1.7m2 Final     GFR Estimate If Black 09/13/2018 87  >60 mL/min/1.7m2 Final     Calcium 09/13/2018 9.2  8.5 - 10.1 mg/dL Final     Bilirubin Direct 09/13/2018 0.2  0.0 - 0.2 mg/dL Final     Bilirubin Total 09/13/2018 0.8  0.2 - 1.3 mg/dL Final     Albumin 09/13/2018 3.9  3.4 - 5.0 g/dL Final     Protein Total 09/13/2018 7.8  6.8 - 8.8 g/dL Final     Alkaline Phosphatase 09/13/2018 145  40 - 150 U/L Final     ALT 09/13/2018 59* 0 - 50 U/L Final     AST 09/13/2018 39  0 - 45 U/L Final     IGG 09/13/2018 1160  695 - 1620 mg/dL Final     IgG1 09/13/2018 416  300 - 856 mg/dL Final     IgG2 09/13/2018 384  158 - 761 mg/dL Final     IgG3 09/13/2018 83  24 - 192 mg/dL Final     IgG4 09/13/2018 95* 11 - 86 mg/dL Final                               Again, thank you for allowing me to participate in the care of your patient.        Sincerely,        Rosanne Marti PA-C

## 2021-07-02 NOTE — PATIENT INSTRUCTIONS
Imagery Breathing        Ocean  Breathing:  To reduce sympathetic nervous system activity while staying alert      Instructions-    Gently,  Gently,  With low intensity    Eyes  Open or closed -- personal preference      Breathe in through your nose to the count of 5    Breathe out through you mouth to the count of 5      do 5 cycles and then scan your body and notice any changes    Repeat as desired      *   Extra credit for placing hands on abdomen and noticing the rise  And fall of your hands  As you shift toward  Abdominal breathing and away from chest breathing    Imagery Breathing for home use   When you need soothing,  Feeling sad,  Tired,  hurt      Gently,  Gently and with soft intensity -    Imagine you are inhaling from the germán and earth ( at the same time)    Breathe in from germán and down into your head and into your chest   AND  Breathe in from the earth, up through your legs and body into the chest.    As the breath ( from germán and earth) reaches your chest see the breath going into your heart,  The 4 chambered organ that pumps blood     see the breath in your pumping heart for 4 seconds    Exhale out the front and back of your heart to the areas of pain  OR  To your whole body.      
normal...

## 2021-07-08 ENCOUNTER — MYC MEDICAL ADVICE (OUTPATIENT)
Dept: GASTROENTEROLOGY | Facility: CLINIC | Age: 56
End: 2021-07-08
Payer: COMMERCIAL

## 2021-07-08 ENCOUNTER — VIRTUAL VISIT (OUTPATIENT)
Dept: PSYCHOLOGY | Facility: CLINIC | Age: 56
End: 2021-07-08
Payer: COMMERCIAL

## 2021-07-08 DIAGNOSIS — R10.11 ABDOMINAL PAIN, RIGHT UPPER QUADRANT: ICD-10-CM

## 2021-07-08 DIAGNOSIS — K90.9 INTESTINAL MALABSORPTION, UNSPECIFIED TYPE: ICD-10-CM

## 2021-07-08 DIAGNOSIS — F43.23 ADJUSTMENT DISORDER WITH MIXED ANXIETY AND DEPRESSED MOOD: Primary | ICD-10-CM

## 2021-07-08 DIAGNOSIS — R63.4 WEIGHT LOSS: Primary | ICD-10-CM

## 2021-07-08 PROCEDURE — 90837 PSYTX W PT 60 MINUTES: CPT | Mod: 95 | Performed by: STUDENT IN AN ORGANIZED HEALTH CARE EDUCATION/TRAINING PROGRAM

## 2021-07-08 ASSESSMENT — PATIENT HEALTH QUESTIONNAIRE - PHQ9
10. IF YOU CHECKED OFF ANY PROBLEMS, HOW DIFFICULT HAVE THESE PROBLEMS MADE IT FOR YOU TO DO YOUR WORK, TAKE CARE OF THINGS AT HOME, OR GET ALONG WITH OTHER PEOPLE: SOMEWHAT DIFFICULT
SUM OF ALL RESPONSES TO PHQ QUESTIONS 1-9: 13
SUM OF ALL RESPONSES TO PHQ QUESTIONS 1-9: 13

## 2021-07-08 NOTE — PROGRESS NOTES
"  Health Psychology                                                                                                                          Sweta Michelle, Ph.D., L.P [phone]  Melva Beebe, Ph.D., L.P. [phone]  Rachel Sloan, Ph.D. [phone]  Kenya Sanchez, Ph.D., L.P. [phone]  Estrada Patel, Ph.D., A.B.P.P., L.P. [phone]         Enedina Joaquin, Ph.D., L.P. [phone]                [facility], 3rd Floor  [address] [place] [address]     Health Psychology Follow-up Visit - Telehealth Visit     Dinora Mcghee is a 56 year old female who is being evaluated via a billable video visit.       The patient has been notified of following:      \"This video visit will be conducted via a video visit between you and your physician/provider. We have found that certain health care needs can be provided without the need for an in-person physical exam.  This service lets us provide the care you need with a video conversation.  If a prescription is necessary we can send it directly to your pharmacy.  If lab work is needed we can place an order for that and you can then stop by our lab to have the test done at a later time.     Video visits are billed at different rates depending on your insurance coverage.  Please reach out to your insurance provider with any questions.     If during the course of the call the physician/provider feels a video visit is not appropriate, you will not be charged for this service.\"     Patient has given verbal consent for Video visit? Yes     Will anyone else be joining your video visit? No    Date of Service:  7/8/21    SUBJECTIVE:  Dinora Mcghee was seen for individual psychotherapy. Reviewed emotional and physical health since our last session.    Symptoms reported: Increased acceptance of possibility of feeding tube, continued feelings of sadness and disappointment with health and ongoing weight loss. She has lost " "five more pounds and is down to 142 pounds. Feelings of anger.     Progress towards goals: started amitryptyline which may have helped with headaches.     Interventions utilized: Reviewed her experiences engaging in self-reflection and implementing information from a book called \"keep it simple, smarty pants\". Reviewed process of increasing her willingness to engage in more treatments, reviewed past experiences of adjusting to things she did not want to. Reviewed patterns of weight loss and discussed her relationship with food and weight.  Reviewed goals she wants for herself, how she defines being healthy, and larger reflections questioning her past decision to have TPIAT.     Treatment plan completed. Ms. Mcghee's primary symptoms are: sadness, anheodnia, poor sleep, low energy, anger, helplessness, anxiety, GI symptoms, pain, and health problems.  She does not have any current risk factors for self- or other-directed violent ideation.  Her symptoms are causing impairment with work, social relationships, and overall quality of life.      Goals are:  to increase capacity to cope with health-related stressors in terms of managing emotional reactions and decision making.  Ms. Mcghee also said she wants to sleep better.  Therapy will take a cognitive and behavioral approach and also integrate aspects of acceptance and commitment therapy (I.e. values-guided living) and dialectical and behavioral therapy (I.e. connecting with wise mind and preventing black and white thinking).     Ms. Mcghee was engaged throughout the visit and expressed understanding of information provided.     OBJECTIVE:  Ms. Mcghee was available for scheduled visit.  She reported slight improvement in mood.  Affect was mood- and thought-congruent.  Tearful at times.  Insight and judgment good.  Thought processes logical and linear. Speech WNL.  Denied suicidal ideation.     ASSESSMENT:  Psychological Assessment:  The PHQ-9 is an instrument for " screening, diagnosing, monitoring and measuring the severity of depression. Scores of 5, 10, 15, and 20 represent cutpoints for mild, moderate, moderately severe and severe depression, respectively.   PHQ-9 SCORE 7/1/2019 6/21/2021 7/8/2021   PHQ-9 Total Score MyChart 3 (Minimal depression) 13 (Moderate depression) 13 (Moderate depression)   PHQ-9 Total Score 3 13 13       The DRACY-7 is an instrument for screening, diagnosing, monitoring and measuring the severity of anxiety. Scores of 5, 10, and 15 represent cutpoints for mild, moderate, and severe anxiety, respectively.  DARCY-7 SCORE 1/11/2018 1/22/2018 6/21/2021   Total Score 2 (minimal anxiety) - 0 (minimal anxiety)   Total Score 2 2 0     Ongoing therapy is appropriate for Ms. Mcghee, she expresses benefit from meeting.     DIAGNOSIS:  Adjustment disorder with mixed anxiety and depressed mood.     PLAN:  Follow-up appointment in two weeks.      Type of service: Telemedicine Psychotherapy for coping with health conditions and symptoms of depression and anxiety  Time of service:   ? Date: 7/8/21  ? Time Service Began: 8:30  ? Time Service Ended: 9:25  Reason that telemedicine is appropriate: Public health regulations due to COVID-19 pandemic/state of emergency  Mode of transmission: Secure real time interactive audio and visual telecommunication system via [employer]  Location of originating and distant sites:  ? Originating site (patient location): Patient's home  ? Distant site (provider site): Provider's home    Rachel Sloan, PhD,   Health Psychology Fellow    Tx plan completed: 7/8/21  Tx plan due:  7/8/22      I did not see this patient directly. I have read and agree with the contents of this note. Kenya Sanchez, PhD, LP, August 24, 2021

## 2021-07-09 ASSESSMENT — PATIENT HEALTH QUESTIONNAIRE - PHQ9: SUM OF ALL RESPONSES TO PHQ QUESTIONS 1-9: 13

## 2021-07-12 ENCOUNTER — TELEPHONE (OUTPATIENT)
Dept: GASTROENTEROLOGY | Facility: CLINIC | Age: 56
End: 2021-07-12

## 2021-07-12 NOTE — TELEPHONE ENCOUNTER
[facility] Call Center    Phone Message    May a detailed message be left on voicemail: yes     Reason for Call: Symptoms or Concerns     If patient has red-flag symptoms, warm transfer to triage line    Current symptom or concern: No BM in 6 days, nausea, feel horrible, and can barely eat - mostly consuming liquids and down to 141 lbs.    Symptoms have been present for:  6 days    Has patient previously been seen for this? Yes    By : Rosanne Marit    Date: 06/28/21    Are there any new or worsening symptoms? Yes: No BMs in 6 days, nausea, can barely eat.    NOTE: Patient flys out to [place] on 7/15/21 so, would like to have issue resolved by then.  Aiotra message sent on 7/8/21 but, no answer as of today, 7/12/21.      Action Taken: Message routed to:  [facility]    Travel Screening: Not Applicable

## 2021-07-13 LAB
ALBUMIN SERPL-MCNC: 4.1 G/DL (ref 3.4–5)
ALP SERPL-CCNC: 116 U/L (ref 40–150)
ALT SERPL W P-5'-P-CCNC: 171 U/L (ref 0–50)
ANION GAP SERPL CALCULATED.3IONS-SCNC: 2 MMOL/L (ref 3–14)
AST SERPL W P-5'-P-CCNC: 87 U/L (ref 0–45)
BILIRUB SERPL-MCNC: 0.6 MG/DL (ref 0.2–1.3)
BUN SERPL-MCNC: 16 MG/DL (ref 7–30)
CALCIUM SERPL-MCNC: 8.8 MG/DL (ref 8.5–10.1)
CHLORIDE BLD-SCNC: 107 MMOL/L (ref 94–109)
CO2 SERPL-SCNC: 31 MMOL/L (ref 20–32)
CREAT SERPL-MCNC: 0.78 MG/DL (ref 0.52–1.04)
ERYTHROCYTE [DISTWIDTH] IN BLOOD BY AUTOMATED COUNT: 12.9 % (ref 10–15)
FERRITIN SERPL-MCNC: 321 NG/ML (ref 8–252)
GFR SERPL CREATININE-BSD FRML MDRD: 86 ML/MIN/1.73M2
GLUCOSE BLD-MCNC: 109 MG/DL (ref 70–99)
HCT VFR BLD AUTO: 41.3 % (ref 35–47)
HGB BLD-MCNC: 13.5 G/DL (ref 11.7–15.7)
INR PPP: 1.1 (ref 0.85–1.15)
IRON SATN MFR SERPL: 24 % (ref 15–46)
IRON SERPL-MCNC: 55 UG/DL (ref 35–180)
MAGNESIUM SERPL-MCNC: 2.6 MG/DL (ref 1.6–2.3)
MCH RBC QN AUTO: 30.8 PG (ref 26.5–33)
MCHC RBC AUTO-ENTMCNC: 32.7 G/DL (ref 31.5–36.5)
MCV RBC AUTO: 94 FL (ref 78–100)
PHOSPHATE SERPL-MCNC: 3.4 MG/DL (ref 2.5–4.5)
PLATELET # BLD AUTO: 358 10E3/UL (ref 150–450)
POTASSIUM BLD-SCNC: 3.9 MMOL/L (ref 3.4–5.3)
PROT SERPL-MCNC: 7.6 G/DL (ref 6.8–8.8)
RBC # BLD AUTO: 4.39 10E6/UL (ref 3.8–5.2)
SODIUM SERPL-SCNC: 140 MMOL/L (ref 133–144)
TIBC SERPL-MCNC: 230 UG/DL (ref 240–430)
VIT B12 SERPL-MCNC: 1876 PG/ML (ref 193–986)
WBC # BLD AUTO: 5.5 10E3/UL (ref 4–11)

## 2021-07-13 PROCEDURE — 84100 ASSAY OF PHOSPHORUS: CPT | Performed by: PATHOLOGY

## 2021-07-13 PROCEDURE — 85027 COMPLETE CBC AUTOMATED: CPT | Performed by: PATHOLOGY

## 2021-07-13 PROCEDURE — 84446 ASSAY OF VITAMIN E: CPT | Mod: 90 | Performed by: PATHOLOGY

## 2021-07-13 PROCEDURE — 36415 COLL VENOUS BLD VENIPUNCTURE: CPT | Performed by: PATHOLOGY

## 2021-07-13 PROCEDURE — 84630 ASSAY OF ZINC: CPT | Mod: 90 | Performed by: PATHOLOGY

## 2021-07-13 PROCEDURE — 82728 ASSAY OF FERRITIN: CPT | Performed by: PATHOLOGY

## 2021-07-13 PROCEDURE — 84590 ASSAY OF VITAMIN A: CPT | Mod: 90 | Performed by: PATHOLOGY

## 2021-07-13 PROCEDURE — 83735 ASSAY OF MAGNESIUM: CPT | Performed by: PATHOLOGY

## 2021-07-13 PROCEDURE — 82306 VITAMIN D 25 HYDROXY: CPT | Mod: 90 | Performed by: PATHOLOGY

## 2021-07-13 PROCEDURE — 82607 VITAMIN B-12: CPT | Performed by: PATHOLOGY

## 2021-07-13 PROCEDURE — 80053 COMPREHEN METABOLIC PANEL: CPT | Performed by: PATHOLOGY

## 2021-07-13 PROCEDURE — 83550 IRON BINDING TEST: CPT | Performed by: PATHOLOGY

## 2021-07-13 PROCEDURE — 85610 PROTHROMBIN TIME: CPT | Performed by: PATHOLOGY

## 2021-07-14 ENCOUNTER — VIRTUAL VISIT (OUTPATIENT)
Dept: GASTROENTEROLOGY | Facility: CLINIC | Age: 56
End: 2021-07-14
Payer: COMMERCIAL

## 2021-07-14 ENCOUNTER — MYC MEDICAL ADVICE (OUTPATIENT)
Dept: RHEUMATOLOGY | Facility: CLINIC | Age: 56
End: 2021-07-14

## 2021-07-14 VITALS — BODY MASS INDEX: 21.9 KG/M2 | WEIGHT: 144 LBS

## 2021-07-14 DIAGNOSIS — K59.01 SLOW TRANSIT CONSTIPATION: ICD-10-CM

## 2021-07-14 DIAGNOSIS — R10.11 ABDOMINAL PAIN, RIGHT UPPER QUADRANT: Primary | ICD-10-CM

## 2021-07-14 DIAGNOSIS — D89.84 IGG4-RELATED SCLEROSING DISEASE (H): Primary | ICD-10-CM

## 2021-07-14 DIAGNOSIS — R14.0 ABDOMINAL DISTENSION: ICD-10-CM

## 2021-07-14 LAB — DEPRECATED CALCIDIOL+CALCIFEROL SERPL-MC: 66 UG/L (ref 20–75)

## 2021-07-14 PROCEDURE — 99215 OFFICE O/P EST HI 40 MIN: CPT | Mod: 95 | Performed by: PHYSICIAN ASSISTANT

## 2021-07-14 RX ORDER — SCOLOPAMINE TRANSDERMAL SYSTEM 1 MG/1
1 PATCH, EXTENDED RELEASE TRANSDERMAL
Qty: 10 PATCH | Refills: 11 | Status: SHIPPED | OUTPATIENT
Start: 2021-07-14 | End: 2023-05-09

## 2021-07-14 RX ORDER — POLYETHYLENE GLYCOL 3350 17 G/17G
1 POWDER, FOR SOLUTION ORAL DAILY
Qty: 507 G | Refills: 11 | Status: SHIPPED | OUTPATIENT
Start: 2021-07-14 | End: 2022-09-08

## 2021-07-14 NOTE — NURSING NOTE
Chief Complaint   Patient presents with     Follow Up       Vitals:    07/14/21 1246   Weight: 65.3 kg (144 lb)       Body mass index is 21.9 kg/m .    Saundra Jaquez CMA

## 2021-07-14 NOTE — LETTER
[facility] [address] [address]  [phone]        July 14, 2021    Regarding:  Dinora Mcghee  [address] [place] [id number]              To Whom It May Concern;    I saw Dinora Mcghee in GI clinic 7/14/2021. Please excuse the patient from her flight as she continues to have severe symptoms.         Sincerely,        Rosanne Marti PA-C

## 2021-07-14 NOTE — PATIENT INSTRUCTIONS
It was a pleasure taking care of you today.  I've included a brief summary of our discussion and care plan from today's visit below.  Please review this information with your primary care provider.  ______________________________________________________________________    My recommendations are summarized as follows:    Plan:  --Continue Motegrity as you have been  --If magnesium is not helpful, switch to MiraLAX.  You can increase as needed up to 3 capfuls a day  --Use of Fleet enema if you skip 2 days without a bowel movement  --Okay to use as needed senna and bisacodyl  --Meet with rheumatology and hepatology  --CT enterography  --Continue with gastroparesis diet, switch to all liquids if symptoms worsen  --Use scopolamine for nausea  -- please see scheduling information provided below     Return to GI Clinic in August with Dr. Genao to review your progress.    ______________________________________________________________________    How do I schedule labs, imaging studies, or procedures that were ordered in clinic today?     Labs: To schedule lab appointment at the [facility], use my chart or call [phone]. If you have a [place] lab closer to home where you are regularly seen you can give them a call.     Procedures: If a colonoscopy, upper endoscopy, breath test, esophageal manometry, or pH impedence was ordered today, our endoscopy team will call you to schedule this. If you have not heard from our endoscopy team within a week, please call [phone] to schedule.     Imaging Studies: If you were scheduled for a CT scan, X-ray, MRI, ultrasound, HIDA scan or other imaging study, please call [phone] to have this scheduled.     Referral: If a referral to another specialty was ordered, expect a phone call or follow instructions above. If you have not heard from anyone regarding your referral in a week, please call our clinic to check the status.     Who do I call with any questions  after my visit?  Please be in touch if there are any further questions that arise following today's visit.  There are multiple ways to contact your gastroenterology care team.        During business hours, you may reach a Gastroenterology nurse at [phone]      To schedule or reschedule an appointment, please call [phone].       You can always send a secure message through [employer].  [employer] messages are answered by your nurse or doctor typically within 24 hours.  Please allow extra time on weekends and holidays.        For urgent/emergent questions after business hours, you may reach the on-call GI Fellow by contacting the [facility]  at [phone].     How will I get the results of any tests ordered?    You will receive all of your results.  If you have signed up for [employer], any tests ordered at your visit will be available to you after your physician reviews them.  Typically this takes 1-2 weeks.  If there are urgent results that require a change in your care plan, your physician or nurse will call you to discuss the next steps.      What is [employer]?  [employer] is a secure way for you to access all of your healthcare records from the [facility].  It is a web based computer program, so you can sign on to it from any location.  It also allows you to send secure messages to your care team.  I recommend signing up for [employer] access if you have not already done so and are comfortable with using a computer.      How to I schedule a follow-up visit?  If you did not schedule a follow-up visit today, please call [phone] to schedule a follow-up office visit.      Sincerely,    Rosanne Marti PA-C  Division of Gastroenterology, Hepatology & Nutrition  [facility]

## 2021-07-14 NOTE — PROGRESS NOTES
"Dinora Mcghee is a 56 year old female who is being evaluated via a billable video visit.      The patient has been notified of following:     \"This video visit will be conducted via a call between you and your physician/provider. We have found that certain health care needs can be provided without the need for an in-person physical exam.  This service lets us provide the care you need with a video conversation.  If a prescription is necessary we can send it directly to your pharmacy.  If lab work is needed we can place an order for that and you can then stop by our lab to have the test done at a later time.    If during the course of the call the physician/provider feels a video visit is not appropriate, you will not be charged for this service.\"     Patient confirmed that they are in [place] for today's visit yes.    Video-Visit Details  Type of service:  Video Visit    Video Start Time: 1:02 PM  Video End Time:  1:50 PM    Originating Location (pt. Location): [place]    Distant Location (provider location):  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     1.  Abdominal pain, nausea/vomiting, constipation  Patient reports worsening abdominal pain, nausea and vomiting starting in November 2020.  Of note, had been working on a more vegan diet around the time.  Was evaluated in the emergency department with unremarkable CT scan and lab work-up.  Then " had small bowel follow-through without evidence of stricture or obstruction.  Patient met with our GI dietitian to discuss the gastroparesis diet which initially did seem to make a big improvement in her symptoms, though they did not return to baseline.  She finds the gastroparesis diet very difficult in the setting of diabetes, and living to cook.  In regards to motility medications, we are very limited because she has had reactions to Reglan.  Has discussed other options including BuSpar and erythromycin with our pharmacist-we will consider pending clinical picture.  In the meantime, patient interested in restarting scopolamine patch which has been helpful in the past.    Patient reported that debilitating bloating was her most bothersome symptom.  She had previously benefited with a course of rifaximin therefore this was repeated without any improvement in her symptoms.     In regards to her constipation, patient has tried multiple medications including Linzess and Trulance which resulted in severe loose stools.  Was previously maintaining on Amitiza (over max recommended dose), however this recently does not seem to be controlling her symptoms.  She was recently started on Motegrity with continued constipation.  We discussed that realistically it will be difficult for her to have a regular daily bowel movement.  Expectation would be for 3 bowel movements a week.  We discussed as needed bowel medications as outlined below.    We also discussed abdominal pain in depth.  She reports severe pain with eating and right-sided abdominal pain.  Had recent elevation in LFTs.  Would recommend meeting with both rheumatology and hepatology to discuss history of IgG4 pseudotumor and elevated liver enzymes.  Could consider MRCP.  Another possible source of abdominal pain is intermittent obstructions.  No significant stricture was visualized on small bowel follow-through however given worsening symptoms, would recommend CT  enterography.  For treatment of pain, would limit Tylenol and Advil and continue to use heating pads and cooling pads.  She can also slightly increase her dose of amitriptyline to 60 mg at bedtime.  Discussed plan for her to monitor for side effects.    We also discussed symptoms that would warrant an emergency department visit, patient reports understanding.     Plan:  --Continue Motegrity as you have been  --If magnesium is not helpful, switch to MiraLAX.  You can increase as needed up to 3 capfuls a day  --Use of Fleet enema if you skip 2 days without a bowel movement  --Okay to use as needed senna and bisacodyl  --Meet with rheumatology and hepatology  --CT enterography  --Continue with gastroparesis diet, switch to all liquids if symptoms worsen  --Use scopolamine for nausea    RTC in August    60 Minutes was spent on the date of the encounter during chart review, history and exam, documentation, and further activities as noted.  Patient discussed with Dr. Genao prior to today's appointment for 28 minutes.     Note completed using voice recognition software. Some word and grammatical errors may occur.    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     History summarized from previous documentation from Dr. Genao and I. Please see previous notes for further details     Gastroparesis  Gastric emptying study with 2-hour study at [facility] have been consistent with gastroparesis, patient had follow-up study here  6/27/2016 with 49% remaining at 4 hours however the patient was not aware she was on narcotics at the time.  Had GJ tubes in fall 2016, NG tube used for venting with tube feeds and was able to wean off of tube feeds after TPAIT.  At initial visit she reported severe migraine headaches associated with vomiting and is on amitriptyline for migraine prophylaxis.  Patient has used several medication for the symptoms including scopolamine patch, prochlorperazine, promethazine which helped symptoms.  She has also been seen by neurology and psychology due to concern that she has a difficult time sleeping and with migraines.  At previous visits, reported nausea with occasional episodes of vomiting.  Her regimen was previously well managed with Compazine in the morning, Phenergan at night.  She did note that nausea and vomiting worsened with migraines.    Patient reports that she had been feeling pretty well up until early November.  Early November the patient noted worsening symptoms.  He was seen at the [facility] emergency department on November 18.  At that time, blood work notable for normal BMP, low lipase, unremarkable hepatic panel.  CBC without elevation of WBCs though she did have elevated platelets, neutrophils and lymphocytes.  CT at that time with moderate stool burden, nonspecific fluid in small intestine.  No evidence of obstruction per documentation. Had similar episodes after this. This had improved with juicing foods.  States that she has 6-7 small meals a day with about a cup of food at each meal.  States that foods such as toast, bread, gluten seem to go better than healthier foods.  Is having a difficult time balancing foods that she enjoys eating, foods that cause symptoms and foods that are healthy.  Will have nausea vomiting and pain with food triggers.  Of note, patient reported increased stress in the setting of a son who is struggling with his own mental health.  At her visits this spring,  she reported that symptoms did seem to be improved with strict dietary measures.     Constipation/irregular bowel movements/bloating  Patient reports history of ongoing constipation for over 20 years after having her first child.  She has tried multiple interventions including a bowel cleanout, MiraLAX, senna, milk of magnesium, Linzess, and Amitiza for which she was on when she first came to [facility] and  GI clinic.  Patient also takes creon.  Patient has done a course of rifaximin in the past with with improvement in bloating and stool consistency.  Patient has also been on specific SIBO/FODMAP diets to the [facility] while avoiding certain food triggers.  Previous pattern would be bowel movements with certain days where she would clear out with multiple bowel movements in 1 day    GERD, Dysphagia   Summarized/taken from prior documentation   Patient experiences GERD as substernal and throat burning with nocturnal relaxant causing choking. She had previously followed with Dr. George Flores in out clinic and reports a prior Schatzki's ring requiring previous dilation. Manometry was noral, and pH impedence had a DeMeester of 24 with positive symptoms association. Patient has treated GERD symptoms with BID PPI, Carafate, H2 blocker, and tums. At her last visit, she had been experiencing heartburn and has restarted omeprazole 40 mg daily with continued symptoms with specific food trigger.  Had also previously been on Zantac up to 300 mg a day.    Interval History 6/28/2021:  Given ongoing symptoms this spring and concern that constipation was contributing to her symptoms, we hoped to try Motegrity which was initially not covered by her insurance.  She tried Trulance first and had uncontrollable diarrhea.  She reports that this significantly impacted her mental health, questioned her desire to live with these debilitating symptoms.  Continue to report severe bloating.  After her last visit, we did a trial of rifaximin for  2 weeks.  This did not worsen her symptoms, but also did not improve her symptoms.  Motegrity was then covered by her insurance which she started around June 11.  Has met with our pharmacist to discuss this and other options moving forward.  Most bothersome symptom since starting Motegrity is a headache.  Within this past week, her symptoms do seem to be slightly improved.  She also took a slightly higher dose of the amitriptyline with 50 mg as opposed to 40 mg which seemed to improve her symptoms.  She states it is too early to tell at this point of Motegrity is improving her symptoms more than Amitiza.    Does report some continued gas and increased flatus.  This kept her up last week, she also reported abdominal pain which radiated to her back.  This has been improved.    Interval history July 14, 2021  Patient recently sent my chart message reporting continued symptoms and worsening pain.  She describes that recently she has had a right upper quadrant pain that radiates to her back which occurs after eating and again at night.  She describes this as a sharp/stabbing pain.  She will try heating pads, cooling pads.  If she sits upright for a while, she will need to lean backwards due to intense pressure.    She also reports sensation of food being stuck shortly after eating.  Sometimes she will regurgitate food immediately after eating, otherwise will vomit afterwards.  She can only tolerate small amounts of food at a time- such as a couple of crackers.  With her beverages, she can only drink a small amount at a time and is sipping throughout the day.      Her bowel pattern continues to be unpredictable.  She is currently taking bisacodyl 3 tablets at bedtime, 2 in the morning, senna 2 tablets at night, Motegrity, max dose magnesium oxide. She recently went 6 days without a bowel movement. She did have a bowel movement yesterday which was runny, sticky.     PROBLEM LIST  Patient Active Problem List    Diagnosis  Date Noted     Iron deficiency anemia 03/22/2019     Priority: Medium     Headache, chronic migraine without aura 09/18/2018     Priority: Medium     Chronic pain syndrome 09/18/2018     Priority: Medium     S/P hernia repair 08/23/2018     Priority: Medium     Incisional hernia, without obstruction or gangrene 05/20/2018     Priority: Medium     Adhesive capsulitis of shoulder, unspecified laterality 11/14/2017     Priority: Medium     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  Document previously scanned on 1/12/17.  Validation form completed and scanned.  Code Status reflects choices in most recent ACP document..  Confirmed/documented designated decision maker(s).  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 Provider to review and confirm.       GERD (gastroesophageal reflux disease) 12/01/2010     Priority: Medium     Lumbago 04/18/2005     Priority: Medium     History of L5-S1 degenerative disk disease.         Sensorineural hearing loss 01/10/2005     Priority: Medium     Problem list name updated by automated process. Provider to review       Vertiginous syndrome and labyrinthine disorder 01/10/2005     Priority: Medium     Problem list name updated by automated process. Provider to review  IMO Regulatory Load OCT 2020       Sprain and strain of  other specified sites of hip and thigh 2002     Priority: Medium     Chondromalacia of patella 2002     Priority: Medium     Need for prophylactic immunotherapy      Priority: Medium     trees, grass, srw, dust mites, cat       Allergic rhinitis due to other allergen 2001     Priority: Medium     Hypothyroidism      Priority: Medium     Problem list name updated by automated process. Provider to review         PERTINENT PAST MEDICAL HISTORY:  Past Medical History:   Diagnosis Date     Allergic rhinitis, cause unspecified      Allergy to other foods     strawberries, apples, celeries, alice, watermelon     Arthritis     left knee     Choledocholithiasis     long after cholecystectomy     Chronic abdominal pain      Chronic constipation      Chronic nausea      Chronic pancreatitis (H)      Degeneration of lumbar or lumbosacral intervertebral disc      Esophageal reflux     w/ hiatal hernia     Gastroparesis      Hiatal hernia      History of pituitary adenoma     s/p resection     Hypothyroidism      Migraines      Mild hyperlipidemia      On tube feeding diet     presence of GJ tube     Pancreatic disease     PD stricture, 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 endometriosis growth     APPENDECTOMY       C  DELIVERY ONLY       C  DELIVERY ONLY      repeat c section with incidental cystotomy with repair     C EXCIS PITUITARY,TRANSNASAL/SEPTAL      pituitary tumor removed for diabetes insipidus     C TOTAL ABDOM HYSTERECTOMY      w/ bilateral salpingoophorectomy       SECTION       COLONOSCOPY       ENDOSCOPIC RETROGRADE CHOLANGIOPANCREATOGRAM N/A 2015    Procedure: ENDOSCOPIC  RETROGRADE CHOLANGIOPANCREATOGRAM;  Surgeon: Mandeep Park MD;  [facility]: [facility]     ENDOSCOPIC RETROGRADE CHOLANGIOPANCREATOGRAM N/A 2/9/2016    Procedure: COMBINED ENDOSCOPIC RETROGRADE CHOLANGIOPANCREATOGRAPHY, PLACE TUBE/STENT;  Surgeon: Mandeep Park MD;  Location: [facility]     ENDOSCOPIC RETROGRADE CHOLANGIOPANCREATOGRAM N/A 3/17/2016    Procedure: COMBINED ENDOSCOPIC RETROGRADE CHOLANGIOPANCREATOGRAPHY, REMOVE FOREIGN BODY OR STENT/TUBE;  Surgeon: Mandeep Park MD;  [facility]: [facility]     ENDOSCOPIC RETROGRADE CHOLANGIOPANCREATOGRAM N/A 8/2/2016    Procedure: COMBINED ENDOSCOPIC RETROGRADE CHOLANGIOPANCREATOGRAPHY, PLACE TUBE/STENT;  Surgeon: Mandeep Park MD;  [facility]     ENDOSCOPIC RETROGRADE CHOLANGIOPANCREATOGRAM N/A 8/26/2016    Procedure: COMBINED ENDOSCOPIC RETROGRADE CHOLANGIOPANCREATOGRAPHY, REMOVE FOREIGN BODY OR STENT/TUBE;  Surgeon: Mandeep Park MD;  [facility]     ENDOSCOPIC ULTRASOUND UPPER GASTROINTESTINAL TRACT (GI) N/A 10/3/2016    Procedure: ENDOSCOPIC ULTRASOUND, ESOPHAGOSCOPY / UPPER GASTROINTESTINAL TRACT (GI);  Surgeon: Guru Jose Rodas MD;  Location: [facility]     ESOPHAGOSCOPY, GASTROSCOPY, DUODENOSCOPY (EGD), COMBINED N/A 6/24/2015    Procedure: COMBINED ESOPHAGOSCOPY, GASTROSCOPY, DUODENOSCOPY (EGD), REMOVE FOREIGN BODY;  Surgeon: Mandeep Park MD;  Location: [facility]     ESOPHAGOSCOPY, GASTROSCOPY, DUODENOSCOPY (EGD), COMBINED N/A 10/25/2015    Procedure: COMBINED ESOPHAGOSCOPY, GASTROSCOPY, DUODENOSCOPY (EGD);  Surgeon: Sammy Amaro MD;  [facility]     ESOPHAGOSCOPY, GASTROSCOPY, DUODENOSCOPY (EGD), COMBINED N/A 10/25/2015    Procedure: COMBINED ESOPHAGOSCOPY, GASTROSCOPY, DUODENOSCOPY (EGD), BIOPSY SINGLE OR MULTIPLE;  Surgeon: Sammy Amaro MD;  Location: [facility]     ESOPHAGOSCOPY, GASTROSCOPY, DUODENOSCOPY (EGD), DILATATION, COMBINED       EXCISE LESION TRUNK N/A 4/17/2017    Procedure:  EXCISE LESION TRUNK;  Removal of Abdominal Foreign Body;  Surgeon: Nestor Phoenix MD;  [facility]     HC ESOPH/GAS REFLUX TEST W NASAL IMPED >1 HR N/A 11/19/2015    Procedure: ESOPHAGEAL IMPEDENCE FUNCTION TEST WITH 24 HOUR PH GREATER THAN 1 HOUR;  Surgeon: Thiago Apple MD;  Location: [facility]     HC U[facility] ENDOSCOPY DIAG W BIOPSY  9/17/08     HC UGI ENDOSCOPY DIAG W BIOPSY  9/27/12     HC UGI ENDOSCOPY W ESOPHAGEAL DILATION BALLOON <30MM  9/17/08     HC UGI ENDOSCOPY W EUS N/A 5/5/2015    Procedure: COMBINED ENDOSCOPIC ULTRASOUND, ESOPHAGOSCOPY, GASTROSCOPY, DUODENOSCOPY (EGD);  Surgeon: Wm Dueñas MD;  Location: [facility]     HC WRIST ARTHROSCOP,RELEASE XVERS LIG Bilateral 12/17/08     INJECT TRANSVERSUS ABDOMINIS PLANE (TAP) BLOCK BILATERAL Left 9/22/2016    Procedure: INJECT TRANSVERSUS ABDOMINIS PLANE (TAP) BLOCK BILATERAL;  Surgeon: Dickson Corrigan MD;  [facility]     laparoscopic pineda  1995     LAPAROSCOPIC HERNIORRHAPHY INCISIONAL N/A 8/23/2018    Procedure: LAPAROSCOPIC HERNIORRHAPHY INCISIONAL;  Laparoscopic Incisional Hernia Repair with Symbotex Mesh Implant;  Surgeon: Nestor Phoenix MD;  [facility]     LAPAROSCOPIC PANCREATECTOMY, TRANSPLANT AUTO ISLET CELL N/A 12/28/2016    Procedure: LAPAROSCOPIC PANCREATECTOMY, TRANSPLANT AUTO ISLET CELL;  Surgeon: Nestor Phoenix MD;  Location[facility]     transphenoidal pituitary resection  1990       ALLERGIES:  Allergies   Allergen Reactions     Apple Anaphylaxis     Corticosteroids Other (See Comments)     All oral, IV and injectable steroids are contraindicated (unless in life threatening situations) in [facility] transplant recipients. They can cause irreversible loss of islet cell function. Please contact patient's transplant care coordinator ADI Gaffney RN at [phone]/pager [phone] and/or endocrinologist prior to administration.       Depakote [Divalproex Sodium] Other (See Comments)     Chest pain     Zithromax  [Azithromycin Dihydrate] Anaphylaxis     Noted in 4/7/08 ER     Darvocet [Propoxyphene N-Apap] Itching     Morphine Nausea and Vomiting and Rash     Nalbuphine Hcl Rash     RASH :nubaine     Zosyn [Piperacillin-Tazobactam In D5w] Rash     Possible allergy, did have a diffuse rash that seemed drug related but could have also been related to soap in the hospital.      Bactrim [Sulfamethoxazole W-Trimethoprim] Other (See Comments) and Nausea and Vomiting     Severely low liver function.     Cats      Compazine [Prochlorperazine] Other (See Comments)     Twitching. Takes Benedryl and is fine     Dust Mites      Grass      Prednisone Other (See Comments)     Insomnia       Ragweeds      Tape [Adhesive Tape] Blisters     Tramadol      Trees      Zofran [Ondansetron] Other (See Comments)     migraine     Flagyl [Metronidazole] Hives and Rash       PERTINENT MEDICATIONS:    Current Outpatient Medications:      acarbose (PRECOSE) 50 MG tablet, Start with 1 pill (50 mg) per day with each meal but can go up to 2 pills (100 mg) per day if needed., Disp: 180 tablet, Rfl: 3     amitriptyline (ELAVIL) 10 MG tablet, Take 5 tablets (50 mg) by mouth At Bedtime Take 40 mg at bedtime, Disp: 150 tablet, Rfl: 11     amylase-lipase-protease (CREON 24) 07128-96932 units CPEP per EC capsule, Take 3-4 capsules by mouth with meals and 1-2 with snacks. Maximum 15 capsules per day., Disp: 450 capsule, Rfl: 11     azelastine (ASTELIN) 0.1 % nasal spray, Spray 1 spray into both nostrils 2 times daily, Disp: 1 Bottle, Rfl: 11     blood glucose (PAT CONTOUR NEXT) test strip, Use to test blood sugar 6 times daily or as directed., Disp: 2 Box, Rfl: 11     blood glucose monitoring (PAT MICROLET) lancets, Use to test blood sugar 6-8 times daily or as directed., Disp: 100 each, Rfl: 11     cetirizine (ZYRTEC) 10 MG tablet, Take 1 tablet (10 mg) by mouth daily, Disp: 90 tablet, Rfl: 3     Continuous Blood Gluc Sensor (FREESTYLE LISA 2 SENSOR) MISC,  1 each every 14 days, Disp: 2 each, Rfl: 11     diphenhydrAMINE (BENADRYL ALLERGY) 25 MG capsule, Take 1 capsule (25 mg) by mouth every 6 hours as needed for itching or allergies, Disp: 56 capsule, Rfl: 0     estradiol (VAGIFEM) 10 MCG TABS vaginal tablet, Place 1 tablet (10 mcg) vaginally twice a week, Disp: 24 tablet, Rfl: 3     famotidine (PEPCID) 20 MG tablet, Take 1 tablet (20 mg) by mouth 2 times daily, Disp: 180 tablet, Rfl: 3     FOLIC ACID PO, Take 1 mg by mouth daily, Disp: , Rfl:      glucose 40 % GEL gel, Take 15-30 g by mouth every 15 minutes as needed for low blood sugar, Disp: 3 Tube, Rfl: 2     ibuprofen (ADVIL/MOTRIN) 400 MG tablet, Take 1 tablet (400 mg) by mouth every 6 hours as needed for moderate pain, Disp: 30 tablet, Rfl: 0     levothyroxine (SYNTHROID) 137 MCG tablet, Take 1 tablet (137 mcg) by mouth daily, Disp: 90 tablet, Rfl: 3     montelukast (SINGULAIR) 10 MG tablet, TAKE 1 TABLET(10 MG) BY MOUTH AT BEDTIME, Disp: 90 tablet, Rfl: 3     multivitamin CF formula (CHOICEFUL) capsule, Take 1 tablet by mouth daily, Disp: , Rfl: 11     omeprazole (PRILOSEC) 40 MG DR capsule, Take 1 capsule (40 mg) by mouth 2 times daily, Disp: 180 capsule, Rfl: 3     order for DME, Equipment being ordered:Cefaly, Disp: 1 Device, Rfl: 0     prochlorperazine (COMPAZINE) 5 MG tablet, Take two 5 mg tablets by mouth every six hours as needed for nausea., Disp: 90 tablet, Rfl: 3     promethazine (PHENERGAN) 25 MG tablet, Take 1 tablet (25 mg) by mouth At Bedtime, Disp: 90 tablet, Rfl: 3     Prucalopride Succinate (MOTEGRITY) 2 MG TABS, Take 2 mg by mouth daily, Disp: 30 tablet, Rfl: 11     senna-docusate (SENOKOT-S;PERICOLACE) 8.6-50 MG per tablet, Take 1-2 tablets by mouth 2 times daily, Disp: 100 tablet, Rfl: 0     Sharps Container (BD SHARPS ) MISC, 1 Container as needed, Disp: 1 each, Rfl: 1     ZOLMitriptan (ZOMIG) 5 MG tablet, Take 1 tablet (5 mg) by mouth at onset of headache for migraine, Disp: 9  tablet, Rfl: 3    SOCIAL HISTORY:  Social History     Socioeconomic History     Marital status:      Spouse name: Norris     Number of children: 3     Years of education: 16     Highest education level: Not on file   Occupational History     Occupation: director     Employer: [employer]   Tobacco Use     Smoking status: Former Smoker     Packs/day: 0.50     Years: 6.00     Pack years: 3.00     Types: Cigarettes     Start date: 1985     Quit date: 1992     Years since quittin.5     Smokeless tobacco: Never Used     Tobacco comment: no 2nd hand   Substance and Sexual Activity     Alcohol use: Yes     Alcohol/week: 3.0 - 6.0 standard drinks     Types: 1 - 2 Glasses of wine, 1 - 2 Cans of beer, 1 - 2 Shots of liquor per week     Comment: occasional     Drug use: No     Sexual activity: Yes     Partners: Male     Birth control/protection: None     Comment: Hystectomy    Other Topics Concern     Parent/sibling w/ CABG, MI or angioplasty before 65F 55M? No      Service Not Asked     Blood Transfusions No     Caffeine Concern Not Asked     Occupational Exposure Not Asked     Hobby Hazards Not Asked     Sleep Concern Not Asked     Stress Concern Not Asked     Weight Concern Not Asked     Special Diet Not Asked     Back Care Not Asked     Exercise Not Asked     Bike Helmet Not Asked     Seat Belt Yes     Self-Exams Not Asked   Social History Narrative     with 3 children and a dog.  No smoking, etoh or drug use.  Worked as a  for [employer] in [place] in the past.  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     Social Determinants of Health     Financial Resource Strain:      Difficulty of Paying Living Expenses:    Food Insecurity:      Worried About Running Out of Food in the Last Year:      Ran Out of Food in the Last Year:    Transportation Needs:      Lack of Transportation (Medical):      Lack of Transportation (Non-Medical):    Physical  Activity:      Days of Exercise per Week:      Minutes of Exercise per Session:    Stress:      Feeling of Stress :    Social Connections:      Frequency of Communication with Friends and Family:      Frequency of Social Gatherings with Friends and Family:      Attends [employer] Services:      Active Member of Clubs or Organizations:      Attends Club or Organization Meetings:      Marital Status:    Intimate Partner Violence:      Fear of Current or Ex-Partner:      Emotionally Abused:      Physically Abused:      Sexually Abused:        FAMILY HISTORY:  Family History   Problem Relation Age of Onset     Eye Disorder Father         cataract, detached retina     Myocardial Infarction Father 60     Lipids Father      Cerebrovascular Disease Father      Depression Father      Substance Abuse Father      Anesthesia Reaction Father         stroke right after surgery     Cataracts Father      Osteoarthritis Father      Ulcerative Colitis Father      Lipids Mother      Hypertension Mother      Thyroid Disease Mother      Depression Mother      Angina Mother      GERD Mother      Skin Cancer Mother      Eye Disorder Son         ptosis     Eye Disorder Paternal Grandmother         cataract     Eye Disorder Paternal Grandfather         cataract     Diabetes Paternal Grandfather      Eye Disorder Maternal Grandmother         cataract     Thyroid Disease Maternal Grandmother      Coronary Artery Disease Sister         angioplasty     GERD Sister      Substance Abuse Sister      Depression Sister      Depression Son      Anxiety Disorder Son      Thyroid Disease Sister      Diabetes Maternal Grandfather      Depression Nephew      Anxiety Disorder Nephew      Thyroid Disease Nephew      Diabetes Type 2  Cousin         paternal cousin     Heart Disease Paternal Aunt      Diabetes Paternal Aunt      Diabetes Paternal Uncle      Heart Disease Paternal Uncle      Past/family/social history reviewed and no changes    PHYSICAL  EXAMINATION:  General appearance: Healthy appearing adult,  Eyes: Sclera anicteric, Pupils round and reactive to light  Ears, nose, mouth and throat: No obvious external lesions of ears and nose, Hearing intact  Neck: Symmetric, No obvious external lesions  Respiratory: Normal respiration, no use of accessory muscles   Skin: No rashes or jaundice   Psychiatric: Oriented to person, place and time    PERTINENT STUDIES:  Orders Only on 09/13/2018   Component Date Value Ref Range Status     WBC 09/13/2018 5.8  4.0 - 11.0 10e9/L Final     RBC Count 09/13/2018 4.51  3.8 - 5.2 10e12/L Final     Hemoglobin 09/13/2018 14.1  11.7 - 15.7 g/dL Final     Hematocrit 09/13/2018 42.1  35.0 - 47.0 % Final     MCV 09/13/2018 93  78 - 100 fl Final     MCH 09/13/2018 31.3  26.5 - 33.0 pg Final     MCHC 09/13/2018 33.5  31.5 - 36.5 g/dL Final     RDW 09/13/2018 13.2  10.0 - 15.0 % Final     Platelet Count 09/13/2018 430  150 - 450 10e9/L Final     Diff Method 09/13/2018 Automated Method   Final     % Neutrophils 09/13/2018 37.1  % Final     % Lymphocytes 09/13/2018 40.5  % Final     % Monocytes 09/13/2018 10.5  % Final     % Eosinophils 09/13/2018 9.8  % Final     % Basophils 09/13/2018 2.1  % Final     % Immature Granulocytes 09/13/2018 0.0  % Final     Nucleated RBCs 09/13/2018 0  0 /100 Final     Absolute Neutrophil 09/13/2018 2.2  1.6 - 8.3 10e9/L Final     Absolute Lymphocytes 09/13/2018 2.4  0.8 - 5.3 10e9/L Final     Absolute Monocytes 09/13/2018 0.6  0.0 - 1.3 10e9/L Final     Absolute Eosinophils 09/13/2018 0.6  0.0 - 0.7 10e9/L Final     Absolute Basophils 09/13/2018 0.1  0.0 - 0.2 10e9/L Final     Abs Immature Granulocytes 09/13/2018 0.0  0 - 0.4 10e9/L Final     Absolute Nucleated RBC 09/13/2018 0.0   Final     Sodium 09/13/2018 138  133 - 144 mmol/L Final     Potassium 09/13/2018 4.3  3.4 - 5.3 mmol/L Final     Chloride 09/13/2018 102  94 - 109 mmol/L Final     Carbon Dioxide 09/13/2018 30  20 - 32 mmol/L Final     Anion  Gap 09/13/2018 5  3 - 14 mmol/L Final     Glucose 09/13/2018 119* 70 - 99 mg/dL Final     Urea Nitrogen 09/13/2018 12  7 - 30 mg/dL Final     Creatinine 09/13/2018 0.83  0.52 - 1.04 mg/dL Final     GFR Estimate 09/13/2018 72  >60 mL/min/1.7m2 Final     GFR Estimate If Black 09/13/2018 87  >60 mL/min/1.7m2 Final     Calcium 09/13/2018 9.2  8.5 - 10.1 mg/dL Final     Bilirubin Direct 09/13/2018 0.2  0.0 - 0.2 mg/dL Final     Bilirubin Total 09/13/2018 0.8  0.2 - 1.3 mg/dL Final     Albumin 09/13/2018 3.9  3.4 - 5.0 g/dL Final     Protein Total 09/13/2018 7.8  6.8 - 8.8 g/dL Final     Alkaline Phosphatase 09/13/2018 145  40 - 150 U/L Final     ALT 09/13/2018 59* 0 - 50 U/L Final     AST 09/13/2018 39  0 - 45 U/L Final     IGG 09/13/2018 1160  695 - 1620 mg/dL Final     IgG1 09/13/2018 416  300 - 856 mg/dL Final     IgG2 09/13/2018 384  158 - 761 mg/dL Final     IgG3 09/13/2018 83  24 - 192 mg/dL Final     IgG4 09/13/2018 95* 11 - 86 mg/dL Final

## 2021-07-14 NOTE — LETTER
"    7/14/2021         RE: Dinora Mcghee  [address] [facility] [address]        Dear Colleague,    Thank you for referring your patient, Dinora Mcghee, to the [facility] [place]. Please see a copy of my visit note below.    Dinora Mcghee is a 56 year old female who is being evaluated via a billable video visit.      The patient has been notified of following:     \"This video visit will be conducted via a call between you and your physician/provider. We have found that certain health care needs can be provided without the need for an in-person physical exam.  This service lets us provide the care you need with a video conversation.  If a prescription is necessary we can send it directly to your pharmacy.  If lab work is needed we can place an order for that and you can then stop by our lab to have the test done at a later time.    If during the course of the call the physician/provider feels a video visit is not appropriate, you will not be charged for this service.\"     Patient confirmed that they are in [place] for today's visit yes.    Video-Visit Details  Type of service:  Video Visit    Video Start Time: 1:02 PM  Video End Time:  1:50 PM    Originating Location (pt. Location): Home    Distant Location (provider location):  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     1.  Abdominal pain, " nausea/vomiting, constipation  Patient reports worsening abdominal pain, nausea and vomiting starting in November 2020.  Of note, had been working on a more vegan diet around the time.  Was evaluated in the emergency department with unremarkable CT scan and lab work-up.  Then had small bowel follow-through without evidence of stricture or obstruction.  Patient met with our GI dietitian to discuss the gastroparesis diet which initially did seem to make a big improvement in her symptoms, though they did not return to baseline.  She finds the gastroparesis diet very difficult in the setting of diabetes, and living to cook.  In regards to motility medications, we are very limited because she has had reactions to Reglan.  Has discussed other options including BuSpar and erythromycin with our pharmacist-we will consider pending clinical picture.  In the meantime, patient interested in restarting scopolamine patch which has been helpful in the past.    Patient reported that debilitating bloating was her most bothersome symptom.  She had previously benefited with a course of rifaximin therefore this was repeated without any improvement in her symptoms.     In regards to her constipation, patient has tried multiple medications including Linzess and Trulance which resulted in severe loose stools.  Was previously maintaining on Amitiza (over max recommended dose), however this recently does not seem to be controlling her symptoms.  She was recently started on Motegrity with continued constipation.  We discussed that realistically it will be difficult for her to have a regular daily bowel movement.  Expectation would be for 3 bowel movements a week.  We discussed as needed bowel medications as outlined below.    We also discussed abdominal pain in depth.  She reports severe pain with eating and right-sided abdominal pain.  Had recent elevation in LFTs.  Would recommend meeting with both rheumatology and hepatology to discuss  history of IgG4 pseudotumor and elevated liver enzymes.  Could consider MRCP.  Another possible source of abdominal pain is intermittent obstructions.  No significant stricture was visualized on small bowel follow-through however given worsening symptoms, would recommend CT enterography.  For treatment of pain, would limit Tylenol and Advil and continue to use heating pads and cooling pads.  She can also slightly increase her dose of amitriptyline to 60 mg at bedtime.  Discussed plan for her to monitor for side effects.    We also discussed symptoms that would warrant an emergency department visit, patient reports understanding.     Plan:  --Continue Motegrity as you have been  --If magnesium is not helpful, switch to MiraLAX.  You can increase as needed up to 3 capfuls a day  --Use of Fleet enema if you skip 2 days without a bowel movement  --Okay to use as needed senna and bisacodyl  --Meet with rheumatology and hepatology  --CT enterography  --Continue with gastroparesis diet, switch to all liquids if symptoms worsen  --Use scopolamine for nausea    RTC in August    60 Minutes was spent on the date of the encounter during chart review, history and exam, documentation, and further activities as noted.  Patient discussed with Dr. Genao prior to today's appointment for 28 minutes.     Note completed using voice recognition software. Some word and grammatical errors may occur.    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      History summarized from previous documentation from Dr. Rosario. Please see previous notes for further details     Gastroparesis  Gastric emptying study with 2-hour study at [facility] have been consistent with gastroparesis, patient had follow-up study here 6/27/2016 with 49% remaining at 4 hours however the patient was not aware she was on narcotics at the time.  Had GJ tubes in fall 2016, NG tube used for venting with tube feeds and was able to wean off of tube feeds after TPAIT.  At initial visit she reported severe migraine headaches associated with vomiting and is on amitriptyline for migraine prophylaxis.  Patient has used several medication for the symptoms including scopolamine patch, prochlorperazine, promethazine which helped symptoms.  She has also been seen by neurology and psychology due to concern that she has a difficult time sleeping and with migraines.  At previous visits, reported nausea with occasional episodes of vomiting.  Her regimen was previously well managed with Compazine in the morning, Phenergan at night.  She did note that nausea and vomiting worsened with migraines.    Patient reports that she had been feeling pretty well up until early November.  Early November the patient noted worsening symptoms.  He was seen at the [facility] emergency department on November 18.  At that time, blood work notable for normal BMP, low lipase, unremarkable hepatic panel.  CBC without elevation of WBCs though she did have elevated platelets, neutrophils and lymphocytes.  CT at that time with moderate stool burden, nonspecific fluid in small intestine.  No evidence of obstruction per documentation. Had similar episodes after this. This had improved with juicing foods.  States that she has 6-7 small meals a day with about a cup of food at each meal.  States that foods such as toast, bread, gluten seem to go better than healthier foods.  Is having a difficult time balancing foods that  she enjoys eating, foods that cause symptoms and foods that are healthy.  Will have nausea vomiting and pain with food triggers.  Of note, patient reported increased stress in the setting of a son who is struggling with his own mental health.  At her visits this spring, she reported that symptoms did seem to be improved with strict dietary measures.     Constipation/irregular bowel movements/bloating  Patient reports history of ongoing constipation for over 20 years after having her first child.  She has tried multiple interventions including a bowel cleanout, MiraLAX, senna, milk of magnesium, Linzess, and Amitiza for which she was on when she first came to [facility] and  GI clinic.  Patient also takes creon.  Patient has done a course of rifaximin in the past with with improvement in bloating and stool consistency.  Patient has also been on specific SIBO/FODMAP diets to the [facility] while avoiding certain food triggers.  Previous pattern would be bowel movements with certain days where she would clear out with multiple bowel movements in 1 day    GERD, Dysphagia   Summarized/taken from prior documentation   Patient experiences GERD as substernal and throat burning with nocturnal relaxant causing choking. She had previously followed with Dr. George Flores in out clinic and reports a prior Schatzki's ring requiring previous dilation. Manometry was noral, and pH impedence had a DeMeester of 24 with positive symptoms association. Patient has treated GERD symptoms with BID PPI, Carafate, H2 blocker, and tums. At her last visit, she had been experiencing heartburn and has restarted omeprazole 40 mg daily with continued symptoms with specific food trigger.  Had also previously been on Zantac up to 300 mg a day.    Interval History 6/28/2021:  Given ongoing symptoms this spring and concern that constipation was contributing to her symptoms, we hoped to try Motegrity which was initially not covered by her insurance.  She  tried Trulance first and had uncontrollable diarrhea.  She reports that this significantly impacted her mental health, questioned her desire to live with these debilitating symptoms.  Continue to report severe bloating.  After her last visit, we did a trial of rifaximin for 2 weeks.  This did not worsen her symptoms, but also did not improve her symptoms.  Motegrity was then covered by her insurance which she started around June 11.  Has met with our pharmacist to discuss this and other options moving forward.  Most bothersome symptom since starting Motegrity is a headache.  Within this past week, her symptoms do seem to be slightly improved.  She also took a slightly higher dose of the amitriptyline with 50 mg as opposed to 40 mg which seemed to improve her symptoms.  She states it is too early to tell at this point of Motegrity is improving her symptoms more than Amitiza.    Does report some continued gas and increased flatus.  This kept her up last week, she also reported abdominal pain which radiated to her back.  This has been improved.    Interval history July 14, 2021  Patient recently sent my chart message reporting continued symptoms and worsening pain.  She describes that recently she has had a right upper quadrant pain that radiates to her back which occurs after eating and again at night.  She describes this as a sharp/stabbing pain.  She will try heating pads, cooling pads.  If she sits upright for a while, she will need to lean backwards due to intense pressure.    She also reports sensation of food being stuck shortly after eating.  Sometimes she will regurgitate food immediately after eating, otherwise will vomit afterwards.  She can only tolerate small amounts of food at a time- such as a couple of crackers.  With her beverages, she can only drink a small amount at a time and is sipping throughout the day.      Her bowel pattern continues to be unpredictable.  She is currently taking bisacodyl 3  tablets at bedtime, 2 in the morning, senna 2 tablets at night, Motegrity, max dose magnesium oxide. She recently went 6 days without a bowel movement. She did have a bowel movement yesterday which was runny, sticky.     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  Document previously scanned on 1/12/17.  Validation form completed and scanned.  Code Status reflects choices in most recent ACP document..  Confirmed/documented designated decision maker(s).  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 Provider to review and confirm.       GERD (gastroesophageal reflux disease) 12/01/2010     Priority: Medium     Lumbago 04/18/2005     Priority: Medium     History of L5-S1 degenerative disk disease.         Sensorineural hearing loss 01/10/2005     Priority: Medium      Problem list name updated by automated process. Provider to review       Vertiginous syndrome and labyrinthine disorder 01/10/2005     Priority: Medium     Problem list name updated by automated process. Provider to review  IMO Regulatory Load OCT 2020       Sprain and strain of other specified sites of hip and thigh 2002     Priority: Medium     Chondromalacia of patella 2002     Priority: Medium     Need for prophylactic immunotherapy      Priority: Medium     trees, grass, srw, dust mites, cat       Allergic rhinitis due to other allergen 2001     Priority: Medium     Hypothyroidism      Priority: Medium     Problem list name updated by automated process. Provider to review         PERTINENT PAST MEDICAL HISTORY:  Past Medical History:   Diagnosis Date     Allergic rhinitis, cause unspecified      Allergy to other foods     strawberries, apples, celeries, alice, watermelon     Arthritis     left knee     Choledocholithiasis     long after cholecystectomy     Chronic abdominal pain      Chronic constipation      Chronic nausea      Chronic pancreatitis (H)      Degeneration of lumbar or lumbosacral intervertebral disc      Esophageal reflux     w/ hiatal hernia     Gastroparesis      Hiatal hernia      History of pituitary adenoma     s/p resection     Hypothyroidism      Migraines      Mild hyperlipidemia      On tube feeding diet     presence of GJ tube     Pancreatic disease     PD stricture, 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 endometriosis growth     APPENDECTOMY       C  DELIVERY ONLY       C  DELIVERY ONLY      repeat c section with incidental cystotomy with repair     C EXCIS  PITUITARY,TRANSNASAL/SEPTAL      pituitary tumor removed for diabetes insipidus     C TOTAL ABDOM HYSTERECTOMY      w/ bilateral salpingoophorectomy       SECTION       COLONOSCOPY       ENDOSCOPIC RETROGRADE CHOLANGIOPANCREATOGRAM N/A 2015    Procedure: ENDOSCOPIC RETROGRADE CHOLANGIOPANCREATOGRAM;  Surgeon: Mandeep Park MD;  Location: [facility]     ENDOSCOPIC RETROGRADE CHOLANGIOPANCREATOGRAM N/A 2016    Procedure: COMBINED ENDOSCOPIC RETROGRADE CHOLANGIOPANCREATOGRAPHY, PLACE TUBE/STENT;  Surgeon: Mandeep Park MD;  Location: [facility]     ENDOSCOPIC RETROGRADE CHOLANGIOPANCREATOGRAM N/A 3/17/2016    Procedure: COMBINED ENDOSCOPIC RETROGRADE CHOLANGIOPANCREATOGRAPHY, REMOVE FOREIGN BODY OR STENT/TUBE;  Surgeon: Mandeep Park MD;  Location: [facility]     ENDOSCOPIC RETROGRADE CHOLANGIOPANCREATOGRAM N/A 2016    Procedure: COMBINED ENDOSCOPIC RETROGRADE CHOLANGIOPANCREATOGRAPHY, PLACE TUBE/STENT;  Surgeon: Mandeep Park MD;  Location: [facility]     ENDOSCOPIC RETROGRADE CHOLANGIOPANCREATOGRAM N/A 2016    Procedure: COMBINED ENDOSCOPIC RETROGRADE CHOLANGIOPANCREATOGRAPHY, REMOVE FOREIGN BODY OR STENT/TUBE;  Surgeon: Mandeep Park MD;  Location: [facility]     ENDOSCOPIC ULTRASOUND UPPER GASTROINTESTINAL TRACT (GI) N/A 10/3/2016    Procedure: ENDOSCOPIC ULTRASOUND, ESOPHAGOSCOPY / UPPER GASTROINTESTINAL TRACT (GI);  Surgeon: Guru Jose Rodas MD;  Location: [facility]     ESOPHAGOSCOPY, GASTROSCOPY, DUODENOSCOPY (EGD), COMBINED N/A 2015    Procedure: COMBINED ESOPHAGOSCOPY, GASTROSCOPY, DUODENOSCOPY (EGD), REMOVE FOREIGN BODY;  Surgeon: Mandeep Park MD;  [facility]     ESOPHAGOSCOPY, GASTROSCOPY, DUODENOSCOPY (EGD), COMBINED N/A 10/25/2015    Procedure: COMBINED ESOPHAGOSCOPY, GASTROSCOPY, DUODENOSCOPY (EGD);  Surgeon: Sammy Amaro MD;  [facility]     ESOPHAGOSCOPY, GASTROSCOPY, DUODENOSCOPY (EGD),  COMBINED N/A 10/25/2015    Procedure: COMBINED ESOPHAGOSCOPY, GASTROSCOPY, DUODENOSCOPY (EGD), BIOPSY SINGLE OR MULTIPLE;  Surgeon: Sammy Amaro MD;  Location: [facility]     ESOPHAGOSCOPY, GASTROSCOPY, DUODENOSCOPY (EGD), DILATATION, COMBINED       EXCISE LESION TRUNK N/A 4/17/2017    Procedure: EXCISE LESION TRUNK;  Removal of Abdominal Foreign Body;  Surgeon: Nestor Phoenix MD;  [facility]     HC ESOPH/GAS REFLUX TEST W NASAL IMPED >1 HR N/A 11/19/2015    Procedure: ESOPHAGEAL IMPEDENCE FUNCTION TEST WITH 24 HOUR PH GREATER THAN 1 HOUR;  Surgeon: Thiago Apple MD;  Location: [facility]     HC UGI ENDOSCOPY DIAG W BIOPSY  9/17/08     HC UGI ENDOSCOPY DIAG W BIOPSY  9/27/12     HC UGI ENDOSCOPY W ESOPHAGEAL DILATION BALLOON <30MM  9/17/08     HC UGI ENDOSCOPY W EUS N/A 5/5/2015    Procedure: COMBINED ENDOSCOPIC ULTRASOUND, ESOPHAGOSCOPY, GASTROSCOPY, DUODENOSCOPY (EGD);  Surgeon: Wm Dueñas MD;  Location: [facility]     HC WRIST ARTHROSCOP,RELEASE XVERS LIG Bilateral 12/17/08     INJECT TRANSVERSUS ABDOMINIS PLANE (TAP) BLOCK BILATERAL Left 9/22/2016    Procedure: INJECT TRANSVERSUS ABDOMINIS PLANE (TAP) BLOCK BILATERAL;  Surgeon: Dickson Corrigan MD;  [facility]     laparoscopic pineda  1995     LAPAROSCOPIC HERNIORRHAPHY INCISIONAL N/A 8/23/2018    Procedure: LAPAROSCOPIC HERNIORRHAPHY INCISIONAL;  Laparoscopic Incisional Hernia Repair with Symbotex Mesh Implant;  Surgeon: Nestor Phoenix MD;  Location: [facility]     LAPAROSCOPIC PANCREATECTOMY, TRANSPLANT AUTO ISLET CELL N/A 12/28/2016    Procedure: LAPAROSCOPIC PANCREATECTOMY, TRANSPLANT AUTO ISLET CELL;  Surgeon: Nestor Phoenix MD;  Location: [facility]     transphenoidal pituitary resection  1990       ALLERGIES:  Allergies   Allergen Reactions     Apple Anaphylaxis     Corticosteroids Other (See Comments)     All oral, IV and injectable steroids are contraindicated (unless in life threatening situations) in [facility] transplant recipients.  They can cause irreversible loss of islet cell function. Please contact patient's transplant care coordinator ADI Gaffney RN at [phone]/pager [phone] and/or endocrinologist prior to administration.       Depakote [Divalproex Sodium] Other (See Comments)     Chest pain     Zithromax [Azithromycin Dihydrate] Anaphylaxis     Noted in 4/7/08 ER     Darvocet [Propoxyphene N-Apap] Itching     Morphine Nausea and Vomiting and Rash     Nalbuphine Hcl Rash     RASH :nubaine     Zosyn [Piperacillin-Tazobactam In D5w] Rash     Possible allergy, did have a diffuse rash that seemed drug related but could have also been related to soap in the hospital.      Bactrim [Sulfamethoxazole W-Trimethoprim] Other (See Comments) and Nausea and Vomiting     Severely low liver function.     Cats      Compazine [Prochlorperazine] Other (See Comments)     Twitching. Takes Benedryl and is fine     Dust Mites      Grass      Prednisone Other (See Comments)     Insomnia       Ragweeds      Tape [Adhesive Tape] Blisters     Tramadol      Trees      Zofran [Ondansetron] Other (See Comments)     migraine     Flagyl [Metronidazole] Hives and Rash       PERTINENT MEDICATIONS:    Current Outpatient Medications:      acarbose (PRECOSE) 50 MG tablet, Start with 1 pill (50 mg) per day with each meal but can go up to 2 pills (100 mg) per day if needed., Disp: 180 tablet, Rfl: 3     amitriptyline (ELAVIL) 10 MG tablet, Take 5 tablets (50 mg) by mouth At Bedtime Take 40 mg at bedtime, Disp: 150 tablet, Rfl: 11     amylase-lipase-protease (CREON 24) 58248-54747 units CPEP per EC capsule, Take 3-4 capsules by mouth with meals and 1-2 with snacks. Maximum 15 capsules per day., Disp: 450 capsule, Rfl: 11     azelastine (ASTELIN) 0.1 % nasal spray, Spray 1 spray into both nostrils 2 times daily, Disp: 1 Bottle, Rfl: 11     blood glucose (PAT CONTOUR NEXT) test strip, Use to test blood sugar 6 times daily or as directed., Disp: 2 Box, Rfl: 11      blood glucose monitoring ([employer]) lancets, Use to test blood sugar 6-8 times daily or as directed., Disp: 100 each, Rfl: 11     cetirizine (ZYRTEC) 10 MG tablet, Take 1 tablet (10 mg) by mouth daily, Disp: 90 tablet, Rfl: 3     Continuous Blood Gluc Sensor (FREESTYLE LISA 2 SENSOR) MISC, 1 each every 14 days, Disp: 2 each, Rfl: 11     diphenhydrAMINE (BENADRYL ALLERGY) 25 MG capsule, Take 1 capsule (25 mg) by mouth every 6 hours as needed for itching or allergies, Disp: 56 capsule, Rfl: 0     estradiol (VAGIFEM) 10 MCG TABS vaginal tablet, Place 1 tablet (10 mcg) vaginally twice a week, Disp: 24 tablet, Rfl: 3     famotidine (PEPCID) 20 MG tablet, Take 1 tablet (20 mg) by mouth 2 times daily, Disp: 180 tablet, Rfl: 3     FOLIC ACID PO, Take 1 mg by mouth daily, Disp: , Rfl:      glucose 40 % GEL gel, Take 15-30 g by mouth every 15 minutes as needed for low blood sugar, Disp: 3 Tube, Rfl: 2     ibuprofen (ADVIL/MOTRIN) 400 MG tablet, Take 1 tablet (400 mg) by mouth every 6 hours as needed for moderate pain, Disp: 30 tablet, Rfl: 0     levothyroxine (SYNTHROID) 137 MCG tablet, Take 1 tablet (137 mcg) by mouth daily, Disp: 90 tablet, Rfl: 3     montelukast (SINGULAIR) 10 MG tablet, TAKE 1 TABLET(10 MG) BY MOUTH AT BEDTIME, Disp: 90 tablet, Rfl: 3     multivitamin CF formula (CHOICEFUL) capsule, Take 1 tablet by mouth daily, Disp: , Rfl: 11     omeprazole (PRILOSEC) 40 MG DR capsule, Take 1 capsule (40 mg) by mouth 2 times daily, Disp: 180 capsule, Rfl: 3     order for DME, Equipment being ordered:Cefaly, Disp: 1 Device, Rfl: 0     prochlorperazine (COMPAZINE) 5 MG tablet, Take two 5 mg tablets by mouth every six hours as needed for nausea., Disp: 90 tablet, Rfl: 3     promethazine (PHENERGAN) 25 MG tablet, Take 1 tablet (25 mg) by mouth At Bedtime, Disp: 90 tablet, Rfl: 3     Prucalopride Succinate (MOTEGRITY) 2 MG TABS, Take 2 mg by mouth daily, Disp: 30 tablet, Rfl: 11     senna-docusate  (SENOKOT-S;PERICOLACE) 8.6-50 MG per tablet, Take 1-2 tablets by mouth 2 times daily, Disp: 100 tablet, Rfl: 0     Sharps Container (BD SHARPS ) MISC, 1 Container as needed, Disp: 1 each, Rfl: 1     ZOLMitriptan (ZOMIG) 5 MG tablet, Take 1 tablet (5 mg) by mouth at onset of headache for migraine, Disp: 9 tablet, Rfl: 3    SOCIAL HISTORY:  Social History     Socioeconomic History     Marital status:      Spouse name: Norris     Number of children: 3     Years of education: 16     Highest education level: Not on file   Occupational History     Occupation: director     Employer: [employer]   Tobacco Use     Smoking status: Former Smoker     Packs/day: 0.50     Years: 6.00     Pack years: 3.00     Types: Cigarettes     Start date: 1985     Quit date: 1992     Years since quittin.5     Smokeless tobacco: Never Used     Tobacco comment: no 2nd hand   Substance and Sexual Activity     Alcohol use: Yes     Alcohol/week: 3.0 - 6.0 standard drinks     Types: 1 - 2 Glasses of wine, 1 - 2 Cans of beer, 1 - 2 Shots of liquor per week     Comment: occasional     Drug use: No     Sexual activity: Yes     Partners: Male     Birth control/protection: None     Comment: Hystectomy    Other Topics Concern     Parent/sibling w/ CABG, MI or angioplasty before 65F 55M? No      Service Not Asked     Blood Transfusions No     Caffeine Concern Not Asked     Occupational Exposure Not Asked     Hobby Hazards Not Asked     Sleep Concern Not Asked     Stress Concern Not Asked     Weight Concern Not Asked     Special Diet Not Asked     Back Care Not Asked     Exercise Not Asked     Bike Helmet Not Asked     Seat Belt Yes     Self-Exams Not Asked   Social History Narrative     with 3 children and a dog.  No smoking, etoh or drug use.  Worked as a  for [email or web address] in [email or web address] in the past.  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     Social Determinants of  Health     Financial Resource Strain:      Difficulty of Paying Living Expenses:    Food Insecurity:      Worried About Running Out of Food in the Last Year:      Ran Out of Food in the Last Year:    Transportation Needs:      Lack of Transportation (Medical):      Lack of Transportation (Non-Medical):    Physical Activity:      Days of Exercise per Week:      Minutes of Exercise per Session:    Stress:      Feeling of Stress :    Social Connections:      Frequency of Communication with Friends and Family:      Frequency of Social Gatherings with Friends and Family:      Attends Congregation Services:      Active Member of Clubs or Organizations:      Attends Club or Organization Meetings:      Marital Status:    Intimate Partner Violence:      Fear of Current or Ex-Partner:      Emotionally Abused:      Physically Abused:      Sexually Abused:        FAMILY HISTORY:  Family History   Problem Relation Age of Onset     Eye Disorder Father         cataract, detached retina     Myocardial Infarction Father 60     Lipids Father      Cerebrovascular Disease Father      Depression Father      Substance Abuse Father      Anesthesia Reaction Father         stroke right after surgery     Cataracts Father      Osteoarthritis Father      Ulcerative Colitis Father      Lipids Mother      Hypertension Mother      Thyroid Disease Mother      Depression Mother      Angina Mother      GERD Mother      Skin Cancer Mother      Eye Disorder Son         ptosis     Eye Disorder Paternal Grandmother         cataract     Eye Disorder Paternal Grandfather         cataract     Diabetes Paternal Grandfather      Eye Disorder Maternal Grandmother         cataract     Thyroid Disease Maternal Grandmother      Coronary Artery Disease Sister         angioplasty     GERD Sister      Substance Abuse Sister      Depression Sister      Depression Son      Anxiety Disorder Son      Thyroid Disease Sister      Diabetes Maternal Grandfather       Depression Nephew      Anxiety Disorder Nephew      Thyroid Disease Nephew      Diabetes Type 2  Cousin         paternal cousin     Heart Disease Paternal Aunt      Diabetes Paternal Aunt      Diabetes Paternal Uncle      Heart Disease Paternal Uncle      Past/family/social history reviewed and no changes    PHYSICAL EXAMINATION:  General appearance: Healthy appearing adult,  Eyes: Sclera anicteric, Pupils round and reactive to light  Ears, nose, mouth and throat: No obvious external lesions of ears and nose, Hearing intact  Neck: Symmetric, No obvious external lesions  Respiratory: Normal respiration, no use of accessory muscles   Skin: No rashes or jaundice   Psychiatric: Oriented to person, place and time    PERTINENT STUDIES:  Orders Only on 09/13/2018   Component Date Value Ref Range Status     WBC 09/13/2018 5.8  4.0 - 11.0 10e9/L Final     RBC Count 09/13/2018 4.51  3.8 - 5.2 10e12/L Final     Hemoglobin 09/13/2018 14.1  11.7 - 15.7 g/dL Final     Hematocrit 09/13/2018 42.1  35.0 - 47.0 % Final     MCV 09/13/2018 93  78 - 100 fl Final     MCH 09/13/2018 31.3  26.5 - 33.0 pg Final     MCHC 09/13/2018 33.5  31.5 - 36.5 g/dL Final     RDW 09/13/2018 13.2  10.0 - 15.0 % Final     Platelet Count 09/13/2018 430  150 - 450 10e9/L Final     Diff Method 09/13/2018 Automated Method   Final     % Neutrophils 09/13/2018 37.1  % Final     % Lymphocytes 09/13/2018 40.5  % Final     % Monocytes 09/13/2018 10.5  % Final     % Eosinophils 09/13/2018 9.8  % Final     % Basophils 09/13/2018 2.1  % Final     % Immature Granulocytes 09/13/2018 0.0  % Final     Nucleated RBCs 09/13/2018 0  0 /100 Final     Absolute Neutrophil 09/13/2018 2.2  1.6 - 8.3 10e9/L Final     Absolute Lymphocytes 09/13/2018 2.4  0.8 - 5.3 10e9/L Final     Absolute Monocytes 09/13/2018 0.6  0.0 - 1.3 10e9/L Final     Absolute Eosinophils 09/13/2018 0.6  0.0 - 0.7 10e9/L Final     Absolute Basophils 09/13/2018 0.1  0.0 - 0.2 10e9/L Final     Abs Immature  Granulocytes 09/13/2018 0.0  0 - 0.4 10e9/L Final     Absolute Nucleated RBC 09/13/2018 0.0   Final     Sodium 09/13/2018 138  133 - 144 mmol/L Final     Potassium 09/13/2018 4.3  3.4 - 5.3 mmol/L Final     Chloride 09/13/2018 102  94 - 109 mmol/L Final     Carbon Dioxide 09/13/2018 30  20 - 32 mmol/L Final     Anion Gap 09/13/2018 5  3 - 14 mmol/L Final     Glucose 09/13/2018 119* 70 - 99 mg/dL Final     Urea Nitrogen 09/13/2018 12  7 - 30 mg/dL Final     Creatinine 09/13/2018 0.83  0.52 - 1.04 mg/dL Final     GFR Estimate 09/13/2018 72  >60 mL/min/1.7m2 Final     GFR Estimate If Black 09/13/2018 87  >60 mL/min/1.7m2 Final     Calcium 09/13/2018 9.2  8.5 - 10.1 mg/dL Final     Bilirubin Direct 09/13/2018 0.2  0.0 - 0.2 mg/dL Final     Bilirubin Total 09/13/2018 0.8  0.2 - 1.3 mg/dL Final     Albumin 09/13/2018 3.9  3.4 - 5.0 g/dL Final     Protein Total 09/13/2018 7.8  6.8 - 8.8 g/dL Final     Alkaline Phosphatase 09/13/2018 145  40 - 150 U/L Final     ALT 09/13/2018 59* 0 - 50 U/L Final     AST 09/13/2018 39  0 - 45 U/L Final     IGG 09/13/2018 1160  695 - 1620 mg/dL Final     IgG1 09/13/2018 416  300 - 856 mg/dL Final     IgG2 09/13/2018 384  158 - 761 mg/dL Final     IgG3 09/13/2018 83  24 - 192 mg/dL Final     IgG4 09/13/2018 95* 11 - 86 mg/dL Final                                 Again, thank you for allowing me to participate in the care of your patient.        Sincerely,        Rosanne Marti PA-C

## 2021-07-15 ENCOUNTER — TELEPHONE (OUTPATIENT)
Dept: GASTROENTEROLOGY | Facility: CLINIC | Age: 56
End: 2021-07-15

## 2021-07-15 DIAGNOSIS — R79.89 ELEVATED LFTS: Primary | ICD-10-CM

## 2021-07-15 NOTE — TELEPHONE ENCOUNTER
Lab orders placed.     Karen BORREGO LPN  Hepatology Clinic      Health Call Center    Phone Message    May a detailed message be left on voicemail: yes     Reason for Call: Order(s): Other:   Reason for requested: Lab Orders  Date needed: ASAP  Provider name: Reggie Wilkerson      Action Taken: Message routed to:  [facility]    Travel Screening: Not Applicable

## 2021-07-15 NOTE — TELEPHONE ENCOUNTER
Called and spoke with pt to she if she would like to schedule an appointment in one month with Rosanne Marti, per virtual visit notes. Pt declined.

## 2021-07-16 LAB — ZINC SERPL-MCNC: 75.2 UG/DL

## 2021-07-16 NOTE — TELEPHONE ENCOUNTER
RECORDS RECEIVED FROM: Internal   Appt Date: 07.22.2021    NOTES STATUS DETAILS   OFFICE NOTE from referring provider N/A    OFFICE NOTES from other specialists Internal 07.14.2021 Rosanne Marti PA-C   DISCHARGE SUMMARY from hospital N/A    MEDICATION LIST Internal    LIVER BIOSPY (IF APPLICABLE)      PATHOLOGY REPORTS  N/A    IMAGING     ENDOSCOPY (IF AVAILABLE) N/A    COLONOSCOPY (IF AVAILABLE) N/A    ULTRASOUND LIVER N/A    CT OF ABDOMEN N/A    MRI OF LIVER Internal 07.13.2017 MRI ABDOMEN   FIBROSCAN, US ELASTOGRAPHY, FIBROSIS SCAN, MR ELASTOGRAPHY N/A    LABS     HEPATIC PANEL (LIVER PANEL) Internal 09.13.2019   BASIC METABOLIC PANEL Internal 09.13.2018   COMPLETE METABOLIC PANEL Internal 07.13.2021   COMPLETE BLOOD COUNT (CBC) Internal 07.13.2021   INTERNATIONAL NORMALIZED RATIO (INR) Internal 07.13.2021   HEPATITIS C ANTIBODY N/A    HEPATITIS C VIRAL LOAD/PCR N/A    HEPATITIS C GENOTYPE N/A    HEPATITIS B SURFACE ANTIGEN N/A    HEPATITIS B SURFACE ANTIBODY N/A    HEPATITIS B DNA QUANT LEVEL N/A    HEPATITIS B CORE ANTIBODY N/A

## 2021-07-17 LAB
A-TOCOPHEROL VIT E SERPL-MCNC: 13.1 MG/L
ANNOTATION COMMENT IMP: NORMAL
BETA+GAMMA TOCOPHEROL SERPL-MCNC: <0.2 MG/L
RETINYL PALMITATE SERPL-MCNC: 0.03 MG/L
VIT A SERPL-MCNC: 0.54 MG/L

## 2021-07-20 ENCOUNTER — ANCILLARY PROCEDURE (OUTPATIENT)
Dept: CT IMAGING | Facility: CLINIC | Age: 56
End: 2021-07-20
Attending: PHYSICIAN ASSISTANT
Payer: COMMERCIAL

## 2021-07-20 ENCOUNTER — LAB (OUTPATIENT)
Dept: LAB | Facility: CLINIC | Age: 56
End: 2021-07-20
Payer: COMMERCIAL

## 2021-07-20 DIAGNOSIS — R10.11 ABDOMINAL PAIN, RIGHT UPPER QUADRANT: ICD-10-CM

## 2021-07-20 DIAGNOSIS — R14.0 ABDOMINAL DISTENSION: ICD-10-CM

## 2021-07-20 DIAGNOSIS — D89.84 IGG4-RELATED SCLEROSING DISEASE (H): ICD-10-CM

## 2021-07-20 DIAGNOSIS — R79.89 ELEVATED LFTS: ICD-10-CM

## 2021-07-20 LAB
ALBUMIN SERPL-MCNC: 3.9 G/DL (ref 3.4–5)
ALP SERPL-CCNC: 115 U/L (ref 40–150)
ALT SERPL W P-5'-P-CCNC: 206 U/L (ref 0–50)
ANION GAP SERPL CALCULATED.3IONS-SCNC: 2 MMOL/L (ref 3–14)
AST SERPL W P-5'-P-CCNC: 125 U/L (ref 0–45)
BILIRUB DIRECT SERPL-MCNC: 0.2 MG/DL (ref 0–0.2)
BILIRUB SERPL-MCNC: 0.6 MG/DL (ref 0.2–1.3)
BUN SERPL-MCNC: 12 MG/DL (ref 7–30)
CALCIUM SERPL-MCNC: 8.5 MG/DL (ref 8.5–10.1)
CHLORIDE BLD-SCNC: 102 MMOL/L (ref 94–109)
CO2 SERPL-SCNC: 32 MMOL/L (ref 20–32)
CREAT SERPL-MCNC: 0.68 MG/DL (ref 0.52–1.04)
ERYTHROCYTE [DISTWIDTH] IN BLOOD BY AUTOMATED COUNT: 13.2 % (ref 10–15)
GFR SERPL CREATININE-BSD FRML MDRD: >90 ML/MIN/1.73M2
GLUCOSE BLD-MCNC: 105 MG/DL (ref 70–99)
HCT VFR BLD AUTO: 40.4 % (ref 35–47)
HGB BLD-MCNC: 13.4 G/DL (ref 11.7–15.7)
INR PPP: 1.13 (ref 0.85–1.15)
MCH RBC QN AUTO: 31.3 PG (ref 26.5–33)
MCHC RBC AUTO-ENTMCNC: 33.2 G/DL (ref 31.5–36.5)
MCV RBC AUTO: 94 FL (ref 78–100)
PLATELET # BLD AUTO: 348 10E3/UL (ref 150–450)
POTASSIUM BLD-SCNC: 4 MMOL/L (ref 3.4–5.3)
PROT SERPL-MCNC: 7 G/DL (ref 6.8–8.8)
RBC # BLD AUTO: 4.28 10E6/UL (ref 3.8–5.2)
SODIUM SERPL-SCNC: 136 MMOL/L (ref 133–144)
WBC # BLD AUTO: 4.5 10E3/UL (ref 4–11)

## 2021-07-20 PROCEDURE — 82248 BILIRUBIN DIRECT: CPT | Performed by: PATHOLOGY

## 2021-07-20 PROCEDURE — 82784 ASSAY IGA/IGD/IGG/IGM EACH: CPT | Mod: 90 | Performed by: PATHOLOGY

## 2021-07-20 PROCEDURE — 74177 CT ABD & PELVIS W/CONTRAST: CPT | Mod: GC | Performed by: RADIOLOGY

## 2021-07-20 PROCEDURE — 80053 COMPREHEN METABOLIC PANEL: CPT | Performed by: PATHOLOGY

## 2021-07-20 PROCEDURE — 36415 COLL VENOUS BLD VENIPUNCTURE: CPT | Performed by: PATHOLOGY

## 2021-07-20 PROCEDURE — 85027 COMPLETE CBC AUTOMATED: CPT | Performed by: PATHOLOGY

## 2021-07-20 PROCEDURE — 85610 PROTHROMBIN TIME: CPT | Performed by: PATHOLOGY

## 2021-07-20 RX ORDER — IOPAMIDOL 755 MG/ML
88 INJECTION, SOLUTION INTRAVASCULAR ONCE
Status: COMPLETED | OUTPATIENT
Start: 2021-07-20 | End: 2021-07-20

## 2021-07-20 RX ADMIN — IOPAMIDOL 88 ML: 755 INJECTION, SOLUTION INTRAVASCULAR at 09:23

## 2021-07-21 LAB — IGG SERPL-MCNC: 886 MG/DL (ref 610–1616)

## 2021-07-22 ENCOUNTER — PRE VISIT (OUTPATIENT)
Dept: GASTROENTEROLOGY | Facility: CLINIC | Age: 56
End: 2021-07-22

## 2021-07-22 ENCOUNTER — VIRTUAL VISIT (OUTPATIENT)
Dept: PSYCHOLOGY | Facility: CLINIC | Age: 56
End: 2021-07-22
Payer: COMMERCIAL

## 2021-07-22 ENCOUNTER — VIRTUAL VISIT (OUTPATIENT)
Dept: GASTROENTEROLOGY | Facility: CLINIC | Age: 56
End: 2021-07-22
Attending: PHYSICIAN ASSISTANT
Payer: COMMERCIAL

## 2021-07-22 DIAGNOSIS — R76.8 IGG4 SELECTIVELY HIGH IN PLASMA: ICD-10-CM

## 2021-07-22 DIAGNOSIS — F43.23 ADJUSTMENT DISORDER WITH MIXED ANXIETY AND DEPRESSED MOOD: Primary | ICD-10-CM

## 2021-07-22 DIAGNOSIS — R79.89 ELEVATED LFTS: Primary | ICD-10-CM

## 2021-07-22 PROCEDURE — 99214 OFFICE O/P EST MOD 30 MIN: CPT | Mod: 95 | Performed by: PHYSICIAN ASSISTANT

## 2021-07-22 PROCEDURE — 90834 PSYTX W PT 45 MINUTES: CPT | Mod: 95 | Performed by: STUDENT IN AN ORGANIZED HEALTH CARE EDUCATION/TRAINING PROGRAM

## 2021-07-22 ASSESSMENT — PAIN SCALES - GENERAL: PAINLEVEL: MODERATE PAIN (5)

## 2021-07-22 NOTE — LETTER
7/22/2021         RE: Dinora Mcghee  [address] [facility] [address]        Dear Colleague,    Thank you for referring your patient, Dinora Mcghee, to the [facility] [place]. Please see a copy of my visit note below.    Hepatology Follow-up Clinic note  Dinora Mcghee   Date of Birth 1965  Date of Service 7/22/2021    Reason for follow-up: elevated LFT's         Assessment/plan:   Dinora Mcghee is a 56 year old female with history of chronic pancreatitis s/p TPAIT and history of hepatic dome lesion/IgG4 pseudotumor and elevated LFT's of unclear etiology, trending up recently (, , Alk Phos 115).  Liver function is normal. She is also followed by [facility] for history of abdominal pain, nausea/vomiting and constipation. Over the past six months, she has had worsening nausea/vomiting and weight loss (15 lbs in the last six months). She follows a gastroparesis diet, which previously helped. She also has a significant bowel regimen (see below) and has bowel movements every three days. Discussed updating hepatobiliary imaging first to assess for any changes with hepatic lesion.     # History of IgG4 pseudotumor/ Elevated LFT's  - MRI/MRCP in the near future   - Discuss further evaluation based on imaging   - Check IgG4 levels     # Okay to have tylenol (<2000 mg a day) and Advil from hepatology standpoint     - Follow-up in clinic with Dr. Lugo in 4-6 weeks     Reggie Wilkerson PA-C   [facility] Hepatology clinic    -----------------------------------------------------       HPI:   Dinora Mcghee is a 56 year old female presenting for follow-up.     Patient was seen by Dr. Lugo in August 2017 for history of IgG4 hepatic dome lesion in the setting of chronic pancreatitis s/p TPAIT. She has been followed closely by [facility] for history of nausea, vomiting, chronic constipation and abdominal pain.     Starting bloat out  after eating, more vomiting/nausea. Pain when eating - upper right quadrant pain and through to back, hot stabbing pain. Heating pad, standing up and moving around makes pain a little better. Eating makes it worse. Sometimes she has spasm pain throughout the rest of abdomen. She had a recent CT Enterography that did not show stricture.     Avg 156 and down to 141 lbs over the past six months. Started for scopalamine patch for nausea. Fatigue is worsened.   Diet - protein drinks, some pasta, scrambled eggs. 3-4 times a week, she will have to eat only a full liquid diet.     For constipation, goal is every 3 day. Normal dark formed/soft. Bowel regimen: Senna, MiraLax, Motegrity ( started 1 month ago). Amitriptyline increased for about a week. Slight improvement in pain.     Feeling flushed / hot. No fever.  She states previously, when she had pancreatitis flare, her LFT's would elevate.    Patient denies jaundice, lower extremity edema. Patient also denies melena, hematochezia or hematemesis. Patient denies fevers, sweats.     No alcohol. No carbonated drinks.  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     Medical hx Surgical hx   Past Medical History:   Diagnosis Date     Allergic rhinitis, cause unspecified      Allergy to other foods      Arthritis      Choledocholithiasis      Chronic abdominal pain      Chronic constipation      Chronic nausea      Chronic pancreatitis (H)      Degeneration of lumbar or lumbosacral intervertebral disc      Esophageal reflux      Gastroparesis      Hiatal hernia      History of pituitary adenoma      Hypothyroidism      Migraines      Mild hyperlipidemia      On tube feeding diet      Pancreatic disease      PONV (postoperative nausea and vomiting)      Portacath in place      Unspecified hearing loss     Past Surgical History:   Procedure Laterality Date     ABDOMEN SURGERY  1999    c sections: 8/23/93, 6/23/96, 4/9/98. endometriosis growth     APPENDECTOMY        C  DELIVERY ONLY       C  DELIVERY ONLY      repeat c section with incidental cystotomy with repair     C EXCIS PITUITARY,TRANSNASAL/SEPTAL  1980    pituitary tumor removed for diabetes insipidus     C TOTAL ABDOM HYSTERECTOMY      w/ bilateral salpingoophorectomy       SECTION       COLONOSCOPY       ENDOSCOPIC RETROGRADE CHOLANGIOPANCREATOGRAM N/A 2015    Procedure: ENDOSCOPIC RETROGRADE CHOLANGIOPANCREATOGRAM;  Surgeon: Mandeep Park MD;  [facility]     ENDOSCOPIC RETROGRADE CHOLANGIOPANCREATOGRAM N/A 2016    Procedure: COMBINED ENDOSCOPIC RETROGRADE CHOLANGIOPANCREATOGRAPHY, PLACE TUBE/STENT;  Surgeon: Mandeep Park MD;  [facility]     ENDOSCOPIC RETROGRADE CHOLANGIOPANCREATOGRAM N/A 3/17/2016    Procedure: COMBINED ENDOSCOPIC RETROGRADE CHOLANGIOPANCREATOGRAPHY, REMOVE FOREIGN BODY OR STENT/TUBE;  Surgeon: Mandeep Park MD;  [facility]     ENDOSCOPIC RETROGRADE CHOLANGIOPANCREATOGRAM N/A 2016    Procedure: COMBINED ENDOSCOPIC RETROGRADE CHOLANGIOPANCREATOGRAPHY, PLACE TUBE/STENT;  Surgeon: Mandeep Park MD;  [facility]     ENDOSCOPIC RETROGRADE CHOLANGIOPANCREATOGRAM N/A 2016    Procedure: COMBINED ENDOSCOPIC RETROGRADE CHOLANGIOPANCREATOGRAPHY, REMOVE FOREIGN BODY OR STENT/TUBE;  Surgeon: Mandeep Park MD;  [facility]     ENDOSCOPIC ULTRASOUND UPPER GASTROINTESTINAL TRACT (GI) N/A 10/3/2016    Procedure: ENDOSCOPIC ULTRASOUND, ESOPHAGOSCOPY / UPPER GASTROINTESTINAL TRACT (GI);  Surgeon: Guru Jose Rodas MD;  [facility]     ESOPHAGOSCOPY, GASTROSCOPY, DUODENOSCOPY (EGD), COMBINED N/A 2015    Procedure: COMBINED ESOPHAGOSCOPY, GASTROSCOPY, DUODENOSCOPY (EGD), REMOVE FOREIGN BODY;  Surgeon: Mandeep Park MD;  [facility]     ESOPHAGOSCOPY, GASTROSCOPY, DUODENOSCOPY (EGD), COMBINED N/A 10/25/2015    Procedure: COMBINED ESOPHAGOSCOPY,  GASTROSCOPY, DUODENOSCOPY (EGD);  Surgeon: Sammy Amaro MD;  [facility]     ESOPHAGOSCOPY, GASTROSCOPY, DUODENOSCOPY (EGD), COMBINED N/A 10/25/2015    Procedure: COMBINED ESOPHAGOSCOPY, GASTROSCOPY, DUODENOSCOPY (EGD), BIOPSY SINGLE OR MULTIPLE;  Surgeon: Sammy Amaro MD;  [facility]     ESOPHAGOSCOPY, GASTROSCOPY, DUODENOSCOPY (EGD), DILATATION, COMBINED       EXCISE LESION TRUNK N/A 4/17/2017    Procedure: EXCISE LESION TRUNK;  Removal of Abdominal Foreign Body;  Surgeon: Nestor Phoenix MD;  [facility]     HC ESOPH/GAS REFLUX TEST W NASAL IMPED >1 HR N/A 11/19/2015    Procedure: ESOPHAGEAL IMPEDENCE FUNCTION TEST WITH 24 HOUR PH GREATER THAN 1 HOUR;  Surgeon: Thiago Apple MD;  Location: [facility]     HC UGI ENDOSCOPY DIAG W BIOPSY  9/17/08     HC UGI ENDOSCOPY DIAG W BIOPSY  9/27/12     HC UGI ENDOSCOPY W ESOPHAGEAL DILATION BALLOON <30MM  9/17/08     HC UGI ENDOSCOPY W EUS N/A 5/5/2015    Procedure: COMBINED ENDOSCOPIC ULTRASOUND, ESOPHAGOSCOPY, GASTROSCOPY, DUODENOSCOPY (EGD);  Surgeon: Wm Dueañs MD;  Location: [facility]     HC WRIST ARTHROSCOP,RELEASE XVERS LIG Bilateral 12/17/08     INJECT TRANSVERSUS ABDOMINIS PLANE (TAP) BLOCK BILATERAL Left 9/22/2016    Procedure: INJECT TRANSVERSUS ABDOMINIS PLANE (TAP) BLOCK BILATERAL;  Surgeon: Dickson Corrigan MD;  [facility]     laparoscopic pineda  1995     LAPAROSCOPIC HERNIORRHAPHY INCISIONAL N/A 8/23/2018    Procedure: LAPAROSCOPIC HERNIORRHAPHY INCISIONAL;  Laparoscopic Incisional Hernia Repair with Symbotex Mesh Implant;  Surgeon: Nestor Phoenix MD;  [facility]     LAPAROSCOPIC PANCREATECTOMY, TRANSPLANT AUTO ISLET CELL N/A 12/28/2016    Procedure: LAPAROSCOPIC PANCREATECTOMY, TRANSPLANT AUTO ISLET CELL;  Surgeon: Nestor Phoenix MD;  [facility]: [facility]     transphenoidal pituitary resection  1990                 Medications:     Current Outpatient Medications   Medication     acarbose (PRECOSE) 50 MG tablet      amitriptyline (ELAVIL) 10 MG tablet     amylase-lipase-protease (CREON 24) 57919-66215 units CPEP per EC capsule     azelastine (ASTELIN) 0.1 % nasal spray     blood glucose (PAT CONTOUR NEXT) test strip     blood glucose monitoring (PAT MICROLET) lancets     cetirizine (ZYRTEC) 10 MG tablet     Continuous Blood Gluc Sensor (FREESTYLE LISA 2 SENSOR) MISC     diphenhydrAMINE (BENADRYL ALLERGY) 25 MG capsule     estradiol (VAGIFEM) 10 MCG TABS vaginal tablet     famotidine (PEPCID) 20 MG tablet     FOLIC ACID PO     glucose 40 % GEL gel     ibuprofen (ADVIL/MOTRIN) 400 MG tablet     levothyroxine (SYNTHROID) 137 MCG tablet     montelukast (SINGULAIR) 10 MG tablet     multivitamin CF formula (CHOICEFUL) capsule     omeprazole (PRILOSEC) 40 MG DR capsule     order for DME     polyethylene glycol (MIRALAX) 17 GM/Dose powder     prochlorperazine (COMPAZINE) 5 MG tablet     promethazine (PHENERGAN) 25 MG tablet     Prucalopride Succinate (MOTEGRITY) 2 MG TABS     scopolamine (TRANSDERM) 1 MG/3DAYS 72 hr patch     senna-docusate (SENOKOT-S;PERICOLACE) 8.6-50 MG per tablet     Sharps Container (BD SHARPS ) MISC     ZOLMitriptan (ZOMIG) 5 MG tablet     No current facility-administered medications for this visit.            Allergies:     Allergies   Allergen Reactions     Apple Anaphylaxis     Corticosteroids Other (See Comments)     All oral, IV and injectable steroids are contraindicated (unless in life threatening situations) in Islet Auto transplant recipients. They can cause irreversible loss of islet cell function. Please contact patient's transplant care coordinator ADI Gaffney RN at [phone]/pager [phone] and/or endocrinologist prior to administration.       Depakote [Divalproex Sodium] Other (See Comments)     Chest pain     Zithromax [Azithromycin Dihydrate] Anaphylaxis     Noted in 4/7/08 ER     Darvocet [Propoxyphene N-Apap] Itching     Morphine Nausea and Vomiting and Rash      Nalbuphine Hcl Rash     RASH :nubaine     Zosyn [Piperacillin-Tazobactam In D5w] Rash     Possible allergy, did have a diffuse rash that seemed drug related but could have also been related to soap in the hospital.      Bactrim [Sulfamethoxazole W-Trimethoprim] Other (See Comments) and Nausea and Vomiting     Severely low liver function.     Cats      Compazine [Prochlorperazine] Other (See Comments)     Twitching. Takes Benedryl and is fine     Dust Mites      Grass      Prednisone Other (See Comments)     Insomnia       Ragweeds      Tape [Adhesive Tape] Blisters     Tramadol      Trees      Zofran [Ondansetron] Other (See Comments)     migraine     Flagyl [Metronidazole] Hives and Rash            Review of Systems:   10 points ROS was obtained and highlighted in the HPI, otherwise negative.          Physical Exam:   GENERAL: healthy, alert and no distress  EYES: Eyes grossly normal to inspection, conjunctivae and sclerae normal  RESP: no audible wheeze, cough, or visible cyanosis.  No visible retractions or increased work of breathing.  Able to speak fully in complete sentences  NEURO: Cranial nerves grossly intact, mentation intact and speech normal  PSYCH: mentation appears normal, affect normal/bright, judgement and insight intact, normal speech and appearance well-groomed         Data:   Reviewed in person and significant for:    Lab Results   Component Value Date     07/20/2021     12/17/2020      Lab Results   Component Value Date    POTASSIUM 4.0 07/20/2021    POTASSIUM 3.5 12/17/2020     Lab Results   Component Value Date    CHLORIDE 102 07/20/2021    CHLORIDE 106 12/17/2020     Lab Results   Component Value Date    CO2 32 07/20/2021    CO2 32 12/17/2020     Lab Results   Component Value Date    BUN 12 07/20/2021    BUN 9 12/17/2020     Lab Results   Component Value Date    CR 0.68 07/20/2021    CR 0.78 12/17/2020       Lab Results   Component Value Date    WBC 4.5 07/20/2021    WBC 5.0  12/17/2020     Lab Results   Component Value Date    HGB 13.4 07/20/2021    HGB 13.5 12/17/2020     Lab Results   Component Value Date    HCT 40.4 07/20/2021    HCT 41.4 12/17/2020     Lab Results   Component Value Date    MCV 94 07/20/2021    MCV 97 12/17/2020     Lab Results   Component Value Date     07/20/2021     12/17/2020       Lab Results   Component Value Date     07/20/2021    AST 24 01/11/2021     Lab Results   Component Value Date     07/20/2021    ALT 52 01/11/2021     Lab Results   Component Value Date    BILICONJ 0.0 10/09/2013      Lab Results   Component Value Date    BILITOTAL 0.6 07/20/2021    BILITOTAL 0.5 12/17/2020       Lab Results   Component Value Date    ALBUMIN 3.9 07/20/2021    ALBUMIN 3.9 12/17/2020     Lab Results   Component Value Date    PROTTOTAL 7.0 07/20/2021    PROTTOTAL 7.2 12/17/2020      Lab Results   Component Value Date    ALKPHOS 115 07/20/2021    ALKPHOS 108 12/17/2020       Lab Results   Component Value Date    INR 1.13 07/20/2021    INR 1.05 01/16/2018       CT ENTEROGRAPHY WITH AND WITHOUT CONTRAST:   7/20/2021:    IMPRESSION:   1. No acute intra-abdominal pathology or evidence of small bowel  dilation or inflammation.  2. Colonic diverticulosis without diverticulitis.  3. Hepatomegaly.  4. 2.2 cm presumed L2 vertebral body hemangioma.    Dinora is a 56 year old who is being evaluated via a billable video visit.      How would you like to obtain your AVS? MyChart  If the video visit is dropped, the invitation should be resent by: Send to e-mail at: [email or web address]  Will anyone else be joining your video visit? No     Video Start Time: 9:43 am   Video-Visit Details    Type of service:  Video Visit    Video End Time: 10:07 am     Originating Location (pt. Location): Home   Distant Location (provider location):  [facility] HEPATOLOGY [facility]     Platform used for Video Visit: Emeli         Again, thank you for allowing me  to participate in the care of your patient.        Sincerely,        Reggie Wilkerson PA-C

## 2021-07-22 NOTE — Clinical Note
Patient needs MRI/MRCP at her next convenience.   Follow up with Dr. Lugo in 4 weeks.   Thanks, Reggie

## 2021-07-22 NOTE — PROGRESS NOTES
Dinora is a 56 year old who is being evaluated via a billable video visit.      How would you like to obtain your AVS? MyChart  If the video visit is dropped, the invitation should be resent by: Send to e-mail at: [email or web address]  Will anyone else be joining your video visit? No     Video Start Time: 9:43 am   Video-Visit Details    Type of service:  Video Visit    Video End Time: 10:07 am     Originating Location (pt. Location): Home   Distant Location (provider location):  [facility]PATOLOG[facility] CLIN[facility] [place]     Platform used for Video Visit: Call Loop

## 2021-07-29 ASSESSMENT — ENCOUNTER SYMPTOMS
POOR WOUND HEALING: 0
BLOOD IN STOOL: 0
SWOLLEN GLANDS: 0
EYE REDNESS: 0
DIARRHEA: 1
INSOMNIA: 1
NAUSEA: 1
SKIN CHANGES: 0
ALTERED TEMPERATURE REGULATION: 1
BRUISES/BLEEDS EASILY: 1
NAIL CHANGES: 0
WEIGHT GAIN: 0
PARALYSIS: 0
BOWEL INCONTINENCE: 0
POLYPHAGIA: 0
RECTAL PAIN: 1
CHILLS: 0
EYE IRRITATION: 0
BLOATING: 1
DIZZINESS: 1
NIGHT SWEATS: 0
SEIZURES: 0
DECREASED APPETITE: 1
JAUNDICE: 0
DEPRESSION: 1
WEAKNESS: 1
MEMORY LOSS: 0
HALLUCINATIONS: 0
FATIGUE: 1
WEIGHT LOSS: 1
POLYDIPSIA: 0
SPEECH CHANGE: 0
EYE PAIN: 1
PANIC: 0
HEARTBURN: 1
TREMORS: 0
NUMBNESS: 0
HEADACHES: 1
FEVER: 0
TINGLING: 0
DISTURBANCES IN COORDINATION: 0
INCREASED ENERGY: 1
LOSS OF CONSCIOUSNESS: 0
NERVOUS/ANXIOUS: 1
VOMITING: 1
CONSTIPATION: 1
DOUBLE VISION: 0
EYE WATERING: 0
ABDOMINAL PAIN: 1
DECREASED CONCENTRATION: 0

## 2021-08-04 ENCOUNTER — OFFICE VISIT (OUTPATIENT)
Dept: GASTROENTEROLOGY | Facility: CLINIC | Age: 56
End: 2021-08-04
Payer: COMMERCIAL

## 2021-08-04 VITALS
SYSTOLIC BLOOD PRESSURE: 112 MMHG | OXYGEN SATURATION: 98 % | HEIGHT: 68 IN | BODY MASS INDEX: 21.72 KG/M2 | DIASTOLIC BLOOD PRESSURE: 75 MMHG | HEART RATE: 103 BPM | WEIGHT: 143.3 LBS

## 2021-08-04 DIAGNOSIS — K31.84 GASTROPARESIS: ICD-10-CM

## 2021-08-04 DIAGNOSIS — R63.4 WEIGHT LOSS: ICD-10-CM

## 2021-08-04 DIAGNOSIS — Z94.83 STATUS POST PANCREAS TRANSPLANTATION (H): ICD-10-CM

## 2021-08-04 DIAGNOSIS — R10.84 ABDOMINAL PAIN, GENERALIZED: Primary | ICD-10-CM

## 2021-08-04 DIAGNOSIS — K59.00 CONSTIPATION, UNSPECIFIED CONSTIPATION TYPE: ICD-10-CM

## 2021-08-04 DIAGNOSIS — R79.89 ELEVATED LFTS: Primary | ICD-10-CM

## 2021-08-04 PROCEDURE — 99417 PROLNG OP E/M EACH 15 MIN: CPT | Performed by: INTERNAL MEDICINE

## 2021-08-04 PROCEDURE — 99215 OFFICE O/P EST HI 40 MIN: CPT | Performed by: INTERNAL MEDICINE

## 2021-08-04 ASSESSMENT — MIFFLIN-ST. JEOR: SCORE: 1289.53

## 2021-08-04 ASSESSMENT — PAIN SCALES - GENERAL: PAINLEVEL: MODERATE PAIN (5)

## 2021-08-04 NOTE — PATIENT INSTRUCTIONS
- continue with liquid only diet as you are  - consider a trial of Boost Andres Amy [employer] Glass in addition to the shakes you're already using  - continue with low fiber and fat loads to stomach  - schedule a CT angio at your earliest convenience  - will check in with your pancreas team to see if there are any post-surgical contributors they'd like to assess for  - would like to start small bowel feedings ---> will check in with your pancreas team  - acetaminophen ok for headaches  - continue scopolamine, ok for prn compazine for breakthrough nausea  - continue your efforts to hydrate  - IVF infusions if unable to keep up on liquids  - follow-up in GI clinic as scheduled with Dr. Genao     If you have any questions, please don't hesitate to contact me through our GI RN Clinic Coordinator, Melyssa Almonte, at [phone].

## 2021-08-04 NOTE — NURSING NOTE
"Chief Complaint   Patient presents with     RECHECK     follow up        Vitals:    08/04/21 1354   BP: 112/75   BP Location: Left arm   Patient Position: Sitting   Cuff Size: Adult Regular   Pulse: 103   SpO2: 98%   Weight: 143 lb 4.8 oz   Height: 5' 7.75\"       Body mass index is 21.95 kg/m .    Jessie Wiseman CMA    "

## 2021-08-04 NOTE — LETTER
8/4/2021         RE: Dinora Mcghee  [address] [employer] [address]        Dear Colleague,    Thank you for referring your patient, Dinora Mcghee, to the [facility] [place]. Please see a copy of my visit note below.    GI CLINIC VISIT    CC:  Follow-up    ASSESSMENT/PLAN:  (R10.84) Abdominal pain, generalized  (primary encounter diagnosis)  (R63.4) Weight loss  (K31.84) Gastroparesis  (K59.00) Constipation, unspecified constipation type  (Z94.83) Status post pancreas transplantation (H)    Pain with eating solids, improved somewhat with prior dietary changes/transition to liquid-only diet, but now having pain, fullness, and nausea even with only liquid diet. Having infrequent vomiting with round-the-clock scopolamine.     Suspect this is 2/2 gastroparesis, but will investigate as below. Note no prior evidence of obstruction, normal CTE and normal SBFT. Even with bowel cleanout no improvement in symptoms.Has not had recent EGD, but PUD is lower on differential (potentially could pursue endoscopic exam at time of J-tube placement, see below).     For GP: No prior response to metoclopramide. Prior anaphylaxis to azithromycin (2008 during an ED visit) so not able to use this or erythromycin as a prokinetic. We discussed bypassing stomach and feeding small bowel if able, and she is more open to this at this time. Would prefer a direct-jejunostomy (many prior issues with G-J with J-tube coiling and related PTSD, even with repositioning would have some concern this could happen in setting of repeated retching/vomiting). Currently maintaining weight and hydration for the last month, but working hard to do so and still quite symptomatic (did lose 16 lbs prior to that). Buspar for gastric accomodation a possibility, but data limited in GP and patient symptoms warrant nutritional support.    Will send for CTA to evaluate for mesenteric ischemia in post-surgical abdomen.    Will also ask that she be seen in Pancreas Clinic as well to ensure no other post-TPIAT complications they'd like to evaluate for and for input on potential jejunostomy (? Preference from her team for endoscopically vs surgically placed).    For constipation, BMs are in a reasonable place - going about 2-3x/week with soft consistency. Offerred switch off of Motegrity due to report of HA, pt declined at this time since BMs are doing ok. If this needs to be stopped, we discussed a possible re-trial of Trulance --> would do a bowel cleanout first, then start Trulance and may be able to avoid marked bowel emptying with start of med (potentially avoid nighttime incontinence which occurred during first couple days of starting Trulance in the past).       - continue with liquid only diet as you are  - consider a trial of Boost Breeze, [employer] in addition to the shakes you're already using  - continue with low fiber and low fat loads to stomach  - schedule a CT angio at your earliest convenience  - will check in with your pancreas team to see if there are any post-surgical contributors they'd like to assess for  - would like to start small bowel feedings ---> will check in with your pancreas team  - acetaminophen ok for headaches  - continue scopolamine, ok for prn compazine for breakthrough nausea  - continue your efforts to hydrate  - IVF infusions if unable to keep up on liquids  - continue current bowel regimen  - follow-up with hepatology and rheumatology as planned  - follow-up in GI clinic as scheduled with Dr. Genao        It was a pleasure to participate in the care of this patient; please contact us with any further questions.  A total of 45 face-to-face minutes was spent with this patient, >50% of which was counseling regarding the above delineated issues. An additional 40 minutes was spent on the date of the encounter doing chart review, documentation, care coordination, and further activities as noted  above.    Vijaya Genao MD   of Medicine  Division of Gastroenterology, Hepatology and Nutrition  [facility]    ---------------------------------------------------------------------------------------------------  HPI:    Ms. 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 She was initially seen in general GI clinic by this writer on 11/1/2017 and has been seen by myself and ANA Gan since that time. Her pancreas txplt surgeon was Dr. Phoenix; she now follows with Dr. Honeycutt. She previously followed with Dr. Park of GI in Pancreas-Biliary Clinic.     History summarized and updated from previous documentation from ANA Gan and myself. Please see previous notes for further details      Gastroparesis  Gastric emptying study with 2-hour study at [facility] have been consistent with gastroparesis, patient had follow-up study here 6/27/2016 with 49% remaining at 4 hours (?on pain meds at the time).  Had GJ tube in fall 2016, NG tube used for venting with tube feeds and was able to wean off of tube feeds after TPIAT.  Patient has used several medication for the symptoms including scopolamine patch, prochlorperazine, promethazine which helped symptoms.  She has also been seen by neurology and psychology due to concern that she has a difficult time sleeping and with migraines.  At previous visits, reported nausea with occasional episodes of vomiting.  Her regimen was previously well managed with Compazine in the morning, Phenergan at night.  She did note that nausea and vomiting worsened with  migraines.     Patient reports that she had been feeling pretty well up until early November.  Early November the patient noted worsening symptoms.  He was seen at the [facility] emergency department on November 18.  At that time, blood work notable for normal BMP, low lipase, unremarkable hepatic panel.  CBC without elevation of WBCs though she did have elevated platelets, neutrophils and lymphocytes.  CT at that time with moderate stool burden, nonspecific fluid in small intestine.  No evidence of obstruction per documentation. Had similar episodes after this. This had improved with juicing foods/liquid-only diet. Was tolerating some solids aiming for 6-7 small meals a day with about a cup of food at each meal.  States that foods such as toast, bread, gluten seem to go better than healthier foods.  Initially these dietary changes seemed to help, but over time the response has waned. From winter to May/June she lost about 16 lbs (from 156-->140 lbs). She continued to experience more pronounced post-prandial pain and fullness and had vomiting of food after eating.     Today, Ms. Loo believes weight has been stable around 140 lbs for a month or so. Her current diet is mostly:  Drinks 70 fl oz of water/24 hours  Also 1 V8 drink, 1 smoothie, 2 Ensure shakes --> thinks about 1200 kevin/day  Occasionally with try a soft solid like rice cereal or yogurt, but lately this has consistently led to abdominal pain. Liquids also lead to pain and abdominal fullness, but she drinks them slowly and tries to push through the discomfort. Nausea is constant, but helped a good deal by scopolamine patch. She wonders what she can use for breakthrough nausea. Vomiting is much less frequent with scopolamine (last episode over a week ago). Urine is clear to light yellow so she feels she's hydrated.     Beyond dietary interventions for GP, she has tried metoclopramide with no response. Has not tried erythromycin or azithromycin  "due to anaphylactic event with prior azithromycin use (in ED in 2008).  Symptoms did not improve with bowel cleanout. Another course of rifaximin (previously successful at addressing bloating and stool issues) was not helpful for her pain, bloating/fullness, and n/v. Amitriptyline was increased to 60 mg nightly with no change in discomfort.      Constipation/irregular bowel movements/bloating  Patient reports history of ongoing constipation for over 20 years after having her first child.  She has tried multiple interventions including a bowel cleanout, MiraLAX, senna, milk of magnesium, Linzess, and Amitiza for which she was on when she first came to  GI clinic.  Patient also takes creon.    Amitiza had worked for awhile then lost effectiveness. She was trialed on Trulance but reported frequent loose stools in the initial days (including nocturnal stooling and incontinence) which prompted her to stop. Trial of rifaximin in the past with with improvement in bloating and stool consistency though, as stated above, a recent re-trial for her pain, n/v, and fullness was not successful. She also tried a bowel cleanout which resulted in two-days of emptying, but no change in her pain, fullness, or n/v symptoms.   Motegrity was then started with some success, though noted a HA. Today, she continues on Motegrity, with 1000 mg Mg citrate tabs, 1 senna, and 1 docusate daily. With this regimen she moves her bowels once every 2-3 days. Stools are \"soft and snakelike\" and easy to pass. She has Fleets enemas on-hand to use if no BM for 3 days, but states she hasn't had to use this as of yet. She denies steatorrhea and feels that Creon dosing is working well. She continues to have a HA which is present every day, though it may leave for a short-time. She hasn't taken much to address the symptom as she only recently heard it was ok to take acetaminophen (from her hepatology visit).        GERD, Dysphagia   Summarized/taken from " prior documentation - not discussed today  Patient experiences GERD as substernal and throat burning with nocturnal relaxant causing choking. She had previously followed with Dr. George Flores in out clinic and reports a prior Schatzki's ring requiring previous dilation. Manometry was noral, and pH impedence had a DeMeester of 24 with positive symptoms association. Patient has treated GERD symptoms with BID PPI, Carafate, H2 blocker, and tums. At her last visit, she had been experiencing heartburn and has restarted omeprazole 40 mg daily with continued symptoms with specific food trigger.  Had also previously been on Zantac up to 300 mg a day.    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  Document previously scanned on 1/12/17.  Validation form completed and scanned.  Code Status reflects choices in most recent ACP document..  Confirmed/documented designated decision maker(s).  Added by May Baires   Advance Care Planning Liaison               Pancreatic insufficiency 01/17/2017     Priority: Medium     Post-pancreatectomy diabetes (H) 12/28/2016     Priority: Medium     Abdominal pain 06/02/2015     Priority: Medium     S/P ERCP 06/02/2015     Priority: Medium      History of ERCP 04/20/2015     Priority: Medium     Other type of intractable migraine      Priority: Medium     Diagnosis updated by automated process. Provider to review and confirm.       GERD (gastroesophageal reflux disease) 12/01/2010     Priority: Medium     Lumbago 04/18/2005     Priority: Medium     History of L5-S1 degenerative disk disease.         Sensorineural hearing loss 01/10/2005     Priority: Medium     Problem list name updated by automated process. Provider to review       Vertiginous syndrome and labyrinthine disorder 01/10/2005     Priority: Medium     Problem list name updated by automated process. Provider to review  IMO Regulatory Load OCT 2020       Sprain and strain of other specified sites of hip and thigh 12/09/2002     Priority: Medium     Chondromalacia of patella 12/09/2002     Priority: Medium     Need for prophylactic immunotherapy      Priority: Medium     trees, grass, srw, dust mites, cat       Allergic rhinitis due to other allergen 12/21/2001     Priority: Medium     Hypothyroidism      Priority: Medium     Problem list name updated by automated process. Provider to review       PERTINENT MEDICATIONS:  Current Outpatient Medications   Medication     acarbose (PRECOSE) 50 MG tablet     amitriptyline (ELAVIL) 10 MG tablet     amylase-lipase-protease (CREON 24) 06713-86840 units CPEP per EC capsule     azelastine (ASTELIN) 0.1 % nasal spray     blood glucose (PAT CONTOUR NEXT) test strip     blood glucose monitoring (PAT MICROLET) lancets     cetirizine (ZYRTEC) 10 MG tablet     Continuous Blood Gluc Sensor (FREESTYLE LISA 2 SENSOR) MISC     diphenhydrAMINE (BENADRYL ALLERGY) 25 MG capsule     estradiol (VAGIFEM) 10 MCG TABS vaginal tablet     famotidine (PEPCID) 20 MG tablet     FOLIC ACID PO     glucose 40 % GEL gel     ibuprofen (ADVIL/MOTRIN) 400 MG tablet     levothyroxine (SYNTHROID) 137 MCG tablet     montelukast (SINGULAIR) 10 MG tablet     multivitamin CF formula  "(CHOICEFUL) capsule     omeprazole (PRILOSEC) 40 MG  capsule     order for DME     polyethylene glycol (MIRALAX) 17 GM/Dose powder     prochlorperazine (COMPAZINE) 5 MG tablet     promethazine (PHENERGAN) 25 MG tablet     Prucalopride Succinate (MOTEGRITY) 2 MG TABS     scopolamine (TRANSDERM) 1 MG/3DAYS 72 hr patch     senna-docusate (SENOKOT-S;PERICOLACE) 8.6-50 MG per tablet     Sharps Container (BD SHARPS ) MISC     ZOLMitriptan (ZOMIG) 5 MG tablet     No current facility-administered medications for this visit.     PHYSICAL EXAMINATION:  Vitals /75 (BP Location: Left arm, Patient Position: Sitting, Cuff Size: Adult Regular)   Pulse 103   Ht 1.721 m (5' 7.75\")   Wt 65 kg (143 lb 4.8 oz)   SpO2 98%   BMI 21.95 kg/m     Wt   Wt Readings from Last 2 Encounters:   08/04/21 65 kg (143 lb 4.8 oz)   07/14/21 65.3 kg (144 lb)      Gen: Pt sitting up in NAD, interactive and cooperative on exam  Eyes: sclerae anicteric, no injection  ENT:  OP clear, MMM  Skin: Warm, dry, no jaundice, nails appear healthy  Neuro: alert, oriented, answers questions appropriately    PERTINENT STUDIES:  Lab on 07/20/2021   Component Date Value Ref Range Status     WBC Count 07/20/2021 4.5  4.0 - 11.0 10e3/uL Final     RBC Count 07/20/2021 4.28  3.80 - 5.20 10e6/uL Final     Hemoglobin 07/20/2021 13.4  11.7 - 15.7 g/dL Final     Hematocrit 07/20/2021 40.4  35.0 - 47.0 % Final     MCV 07/20/2021 94  78 - 100 fL Final     MCH 07/20/2021 31.3  26.5 - 33.0 pg Final     MCHC 07/20/2021 33.2  31.5 - 36.5 g/dL Final     RDW 07/20/2021 13.2  10.0 - 15.0 % Final     Platelet Count 07/20/2021 348  150 - 450 10e3/uL Final     Sodium 07/20/2021 136  133 - 144 mmol/L Final     Potassium 07/20/2021 4.0  3.4 - 5.3 mmol/L Final     Chloride 07/20/2021 102  94 - 109 mmol/L Final     Carbon Dioxide (CO2) 07/20/2021 32  20 - 32 mmol/L Final     Anion Gap 07/20/2021 2* 3 - 14 mmol/L Final     Urea Nitrogen 07/20/2021 12  7 - 30 mg/dL Final "     Creatinine 07/20/2021 0.68  0.52 - 1.04 mg/dL Final     Calcium 07/20/2021 8.5  8.5 - 10.1 mg/dL Final     Glucose 07/20/2021 105* 70 - 99 mg/dL Final     GFR Estimate 07/20/2021 >90  >60 mL/min/1.73m2 Final     INR 07/20/2021 1.13  0.85 - 1.15 Final     Bilirubin Total 07/20/2021 0.6  0.2 - 1.3 mg/dL Final     Bilirubin Direct 07/20/2021 0.2  0.0 - 0.2 mg/dL Final     Protein Total 07/20/2021 7.0  6.8 - 8.8 g/dL Final     Albumin 07/20/2021 3.9  3.4 - 5.0 g/dL Final     Alkaline Phosphatase 07/20/2021 115  40 - 150 U/L Final     AST 07/20/2021 125* 0 - 45 U/L Final     ALT 07/20/2021 206* 0 - 50 U/L Final     Immunoglobulin G 07/20/2021 886  610-1,616 mg/dL Final       Again, thank you for allowing me to participate in the care of your patient.      Sincerely,    Vijaya Genao MD

## 2021-08-04 NOTE — PROGRESS NOTES
GI CLINIC VISIT    CC:  Follow-up      ASSESSMENT/PLAN:  (R10.84) Abdominal pain, generalized  (primary encounter diagnosis)  (R63.4) Weight loss  (K31.84) Gastroparesis  (K59.00) Constipation, unspecified constipation type  (Z94.83) Status post pancreas transplantation (H)    Pain with eating solids, improved somewhat with prior dietary changes/transition to liquid-only diet, but now having pain, fullness, and nausea even with only liquid diet. Having infrequent vomiting with round-the-clock scopolamine.     Suspect this is 2/2 gastroparesis, but will investigate as below. Note no prior evidence of obstruction, normal CTE and normal SBFT. Even with bowel cleanout no improvement in symptoms.Has not had recent EGD, but PUD is lower on differential (potentially could pursue endoscopic exam at time of J-tube placement, see below).     For GP: No prior response to metoclopramide. Prior anaphylaxis to azithromycin (2008 during an ED visit) so not able to use this or erythromycin as a prokinetic. We discussed bypassing stomach and feeding small bowel if able, and she is more open to this at this time. Would prefer a direct-jejunostomy (many prior issues with G-J with J-tube coiling and related PTSD, even with repositioning would have some concern this could happen in setting of repeated retching/vomiting). Currently maintaining weight and hydration for the last month, but working hard to do so and still quite symptomatic (did lose 16 lbs prior to that). Buspar for gastric accomodation a possibility, but data limited in GP and patient symptoms warrant nutritional support.    Will send for CTA to evaluate for mesenteric ischemia in post-surgical abdomen.   Will also ask that she be seen in Pancreas Clinic as well to ensure no other post-TPIAT complications they'd like to evaluate for and for input on potential jejunostomy (? Preference from her team for endoscopically vs surgically placed).    For constipation, BMs are in a  reasonable place - going about 2-3x/week with soft consistency. Offerred switch off of Motegrity due to report of HA, pt declined at this time since BMs are doing ok. If this needs to be stopped, we discussed a possible re-trial of Trulance --> would do a bowel cleanout first, then start Trulance and may be able to avoid marked bowel emptying with start of med (potentially avoid nighttime incontinence which occurred during first couple days of starting Trulance in the past).       - continue with liquid only diet as you are  - consider a trial of Boost Breeze, [employer] in addition to the shakes you're already using  - continue with low fiber and low fat loads to stomach  - schedule a CT angio at your earliest convenience  - will check in with your pancreas team to see if there are any post-surgical contributors they'd like to assess for  - would like to start small bowel feedings ---> will check in with your pancreas team  - acetaminophen ok for headaches  - continue scopolamine, ok for prn compazine for breakthrough nausea  - continue your efforts to hydrate  - IVF infusions if unable to keep up on liquids  - continue current bowel regimen  - follow-up with hepatology and rheumatology as planned  - follow-up in GI clinic as scheduled with Dr. Genao        It was a pleasure to participate in the care of this patient; please contact us with any further questions.  A total of 45 face-to-face minutes was spent with this patient, >50% of which was counseling regarding the above delineated issues. An additional 40 minutes was spent on the date of the encounter doing chart review, documentation, care coordination, and further activities as noted above.    Vijaya Genao MD   of Medicine  Division of Gastroenterology, Hepatology and Nutrition  [facility]    ---------------------------------------------------------------------------------------------------  HPI:    Ms. 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 She was initially seen in general GI clinic by this writer on 11/1/2017 and has been seen by myself and ANA Gan since that time. Her pancreas txplt surgeon was Dr. Phoenix; she now follows with Dr. Honeycutt. She previously followed with Dr. Park of GI in [facility].     History summarized and updated from previous documentation from ANA Gan and myself. Please see previous notes for further details      Gastroparesis  Gastric emptying study with 2-hour study at [facility] have been consistent with gastroparesis, patient had follow-up study here 6/27/2016 with 49% remaining at 4 hours (?on pain meds at the time).  Had GJ tube in fall 2016, NG tube used for venting with tube feeds and was able to wean off of tube feeds after TPIAT.  Patient has used several medication for the symptoms including scopolamine patch, prochlorperazine, promethazine which helped symptoms.  She has also been seen by neurology and psychology due to concern that she has a difficult time sleeping and with migraines.  At previous visits, reported nausea with occasional episodes of vomiting.  Her regimen was previously well managed with Compazine in the morning, Phenergan at night.  She did note that nausea and vomiting worsened with migraines.     Patient reports that she had been feeling pretty well up until early November.  Early November the patient noted worsening symptoms.  He was seen at the [facility] emergency department on November 18.  At that time, blood work notable for normal BMP, low lipase,  unremarkable hepatic panel.  CBC without elevation of WBCs though she did have elevated platelets, neutrophils and lymphocytes.  CT at that time with moderate stool burden, nonspecific fluid in small intestine.  No evidence of obstruction per documentation. Had similar episodes after this. This had improved with juicing foods/liquid-only diet. Was tolerating some solids aiming for 6-7 small meals a day with about a cup of food at each meal.  States that foods such as toast, bread, gluten seem to go better than healthier foods.  Initially these dietary changes seemed to help, but over time the response has waned. From winter to May/June she lost about 16 lbs (from 156-->140 lbs). She continued to experience more pronounced post-prandial pain and fullness and had vomiting of food after eating.     Today, Ms. Loo believes weight has been stable around 140 lbs for a month or so. Her current diet is mostly:  Drinks 70 fl oz of water/24 hours  Also 1 V8 drink, 1 smoothie, 2 Ensure shakes --> thinks about 1200 kevin/day  Occasionally with try a soft solid like rice cereal or yogurt, but lately this has consistently led to abdominal pain. Liquids also lead to pain and abdominal fullness, but she drinks them slowly and tries to push through the discomfort. Nausea is constant, but helped a good deal by scopolamine patch. She wonders what she can use for breakthrough nausea. Vomiting is much less frequent with scopolamine (last episode over a week ago). Urine is clear to light yellow so she feels she's hydrated.     Beyond dietary interventions for GP, she has tried metoclopramide with no response. Has not tried erythromycin or azithromycin due to anaphylactic event with prior azithromycin use (in ED in 2008).  Symptoms did not improve with bowel cleanout. Another course of rifaximin (previously successful at addressing bloating and stool issues) was not helpful for her pain, bloating/fullness, and n/v. Amitriptyline was  "increased to 60 mg nightly with no change in discomfort.      Constipation/irregular bowel movements/bloating  Patient reports history of ongoing constipation for over 20 years after having her first child.  She has tried multiple interventions including a bowel cleanout, MiraLAX, senna, milk of magnesium, Linzess, and Amitiza for which she was on when she first came to  GI clinic.  Patient also takes creon.    Amitiza had worked for awhile then lost effectiveness. She was trialed on Trulance but reported frequent loose stools in the initial days (including nocturnal stooling and incontinence) which prompted her to stop. Trial of rifaximin in the past with with improvement in bloating and stool consistency though, as stated above, a recent re-trial for her pain, n/v, and fullness was not successful. She also tried a bowel cleanout which resulted in two-days of emptying, but no change in her pain, fullness, or n/v symptoms.   Motegrity was then started with some success, though noted a HA. Today, she continues on Motegrity, with 1000 mg Mg citrate tabs, 1 senna, and 1 docusate daily. With this regimen she moves her bowels once every 2-3 days. Stools are \"soft and snakelike\" and easy to pass. She has Fleets enemas on-hand to use if no BM for 3 days, but states she hasn't had to use this as of yet. She denies steatorrhea and feels that Creon dosing is working well. She continues to have a HA which is present every day, though it may leave for a short-time. She hasn't taken much to address the symptom as she only recently heard it was ok to take acetaminophen (from her hepatology visit).        GERD, Dysphagia   Summarized/taken from prior documentation - not discussed today  Patient experiences GERD as substernal and throat burning with nocturnal relaxant causing choking. She had previously followed with Dr. George Flores in out clinic and reports a prior Schatzki's ring requiring previous dilation. Manometry was " noral, and pH impedence had a DeMeester of 24 with positive symptoms association. Patient has treated GERD symptoms with BID PPI, Carafate, H2 blocker, and tums. At her last visit, she had been experiencing heartburn and has restarted omeprazole 40 mg daily with continued symptoms with specific food trigger.  Had also previously been on Zantac up to 300 mg a day.      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  Document previously scanned on 1/12/17.  Validation form completed and scanned.  Code Status reflects choices in most recent ACP document..  Confirmed/documented designated decision maker(s).  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 Provider to review and confirm.       GERD (gastroesophageal reflux disease) 12/01/2010     Priority: Medium     Lumbago  04/18/2005     Priority: Medium     History of L5-S1 degenerative disk disease.         Sensorineural hearing loss 01/10/2005     Priority: Medium     Problem list name updated by automated process. Provider to review       Vertiginous syndrome and labyrinthine disorder 01/10/2005     Priority: Medium     Problem list name updated by automated process. Provider to review  IMO Regulatory Load OCT 2020       Sprain and strain of other specified sites of hip and thigh 12/09/2002     Priority: Medium     Chondromalacia of patella 12/09/2002     Priority: Medium     Need for prophylactic immunotherapy      Priority: Medium     trees, grass, srw, dust mites, cat       Allergic rhinitis due to other allergen 12/21/2001     Priority: Medium     Hypothyroidism      Priority: Medium     Problem list name updated by automated process. Provider to review         PERTINENT MEDICATIONS:  Current Outpatient Medications   Medication     acarbose (PRECOSE) 50 MG tablet     amitriptyline (ELAVIL) 10 MG tablet     amylase-lipase-protease (CREON 24) 31999-82980 units CPEP per EC capsule     azelastine (ASTELIN) 0.1 % nasal spray     blood glucose (PAT CONTOUR NEXT) test strip     blood glucose monitoring (PAT MICROLET) lancets     cetirizine (ZYRTEC) 10 MG tablet     Continuous Blood Gluc Sensor (FREESTYLE LISA 2 SENSOR) MISC     diphenhydrAMINE (BENADRYL ALLERGY) 25 MG capsule     estradiol (VAGIFEM) 10 MCG TABS vaginal tablet     famotidine (PEPCID) 20 MG tablet     FOLIC ACID PO     glucose 40 % GEL gel     ibuprofen (ADVIL/MOTRIN) 400 MG tablet     levothyroxine (SYNTHROID) 137 MCG tablet     montelukast (SINGULAIR) 10 MG tablet     multivitamin CF formula (CHOICEFUL) capsule     omeprazole (PRILOSEC) 40 MG DR capsule     order for DME     polyethylene glycol (MIRALAX) 17 GM/Dose powder     prochlorperazine (COMPAZINE) 5 MG tablet     promethazine (PHENERGAN) 25 MG tablet     Prucalopride Succinate (MOTEGRITY) 2 MG TABS      "scopolamine (TRANSDERM) 1 MG/3DAYS 72 hr patch     senna-docusate (SENOKOT-S;PERICOLACE) 8.6-50 MG per tablet     Sharps Container (BD SHARPS ) MISC     ZOLMitriptan (ZOMIG) 5 MG tablet     No current facility-administered medications for this visit.         PHYSICAL EXAMINATION:  Vitals /75 (BP Location: Left arm, Patient Position: Sitting, Cuff Size: Adult Regular)   Pulse 103   Ht 1.721 m (5' 7.75\")   Wt 65 kg (143 lb 4.8 oz)   SpO2 98%   BMI 21.95 kg/m     Wt   Wt Readings from Last 2 Encounters:   08/04/21 65 kg (143 lb 4.8 oz)   07/14/21 65.3 kg (144 lb)      Gen: Pt sitting up in NAD, interactive and cooperative on exam  Eyes: sclerae anicteric, no injection  ENT:  OP clear, MMM  Skin: Warm, dry, no jaundice, nails appear healthy  Neuro: alert, oriented, answers questions appropriately      PERTINENT STUDIES:    Lab on 07/20/2021   Component Date Value Ref Range Status     WBC Count 07/20/2021 4.5  4.0 - 11.0 10e3/uL Final     RBC Count 07/20/2021 4.28  3.80 - 5.20 10e6/uL Final     Hemoglobin 07/20/2021 13.4  11.7 - 15.7 g/dL Final     Hematocrit 07/20/2021 40.4  35.0 - 47.0 % Final     MCV 07/20/2021 94  78 - 100 fL Final     MCH 07/20/2021 31.3  26.5 - 33.0 pg Final     MCHC 07/20/2021 33.2  31.5 - 36.5 g/dL Final     RDW 07/20/2021 13.2  10.0 - 15.0 % Final     Platelet Count 07/20/2021 348  150 - 450 10e3/uL Final     Sodium 07/20/2021 136  133 - 144 mmol/L Final     Potassium 07/20/2021 4.0  3.4 - 5.3 mmol/L Final     Chloride 07/20/2021 102  94 - 109 mmol/L Final     Carbon Dioxide (CO2) 07/20/2021 32  20 - 32 mmol/L Final     Anion Gap 07/20/2021 2* 3 - 14 mmol/L Final     Urea Nitrogen 07/20/2021 12  7 - 30 mg/dL Final     Creatinine 07/20/2021 0.68  0.52 - 1.04 mg/dL Final     Calcium 07/20/2021 8.5  8.5 - 10.1 mg/dL Final     Glucose 07/20/2021 105* 70 - 99 mg/dL Final     GFR Estimate 07/20/2021 >90  >60 mL/min/1.73m2 Final     INR 07/20/2021 1.13  0.85 - 1.15 Final     " Bilirubin Total 07/20/2021 0.6  0.2 - 1.3 mg/dL Final     Bilirubin Direct 07/20/2021 0.2  0.0 - 0.2 mg/dL Final     Protein Total 07/20/2021 7.0  6.8 - 8.8 g/dL Final     Albumin 07/20/2021 3.9  3.4 - 5.0 g/dL Final     Alkaline Phosphatase 07/20/2021 115  40 - 150 U/L Final     AST 07/20/2021 125* 0 - 45 U/L Final     ALT 07/20/2021 206* 0 - 50 U/L Final     Immunoglobulin G 07/20/2021 886  610-1,616 mg/dL Final

## 2021-08-05 ENCOUNTER — PATIENT OUTREACH (OUTPATIENT)
Dept: GASTROENTEROLOGY | Facility: CLINIC | Age: 56
End: 2021-08-05

## 2021-08-05 ENCOUNTER — VIRTUAL VISIT (OUTPATIENT)
Dept: PSYCHOLOGY | Facility: CLINIC | Age: 56
End: 2021-08-05
Payer: COMMERCIAL

## 2021-08-05 ENCOUNTER — LAB (OUTPATIENT)
Dept: LAB | Facility: CLINIC | Age: 56
End: 2021-08-05
Payer: COMMERCIAL

## 2021-08-05 ENCOUNTER — OFFICE VISIT (OUTPATIENT)
Dept: TRANSPLANT | Facility: CLINIC | Age: 56
End: 2021-08-05
Attending: PEDIATRICS
Payer: COMMERCIAL

## 2021-08-05 VITALS
DIASTOLIC BLOOD PRESSURE: 64 MMHG | WEIGHT: 142 LBS | BODY MASS INDEX: 21.75 KG/M2 | OXYGEN SATURATION: 97 % | SYSTOLIC BLOOD PRESSURE: 102 MMHG | HEART RATE: 102 BPM

## 2021-08-05 DIAGNOSIS — D89.84 IGG4-RELATED SCLEROSING DISEASE (H): ICD-10-CM

## 2021-08-05 DIAGNOSIS — E16.2 HYPOGLYCEMIA: ICD-10-CM

## 2021-08-05 DIAGNOSIS — E89.1 POST-PANCREATECTOMY DIABETES (H): Primary | ICD-10-CM

## 2021-08-05 DIAGNOSIS — R76.8 IGG4 SELECTIVELY HIGH IN PLASMA: ICD-10-CM

## 2021-08-05 DIAGNOSIS — E13.9 POST-PANCREATECTOMY DIABETES (H): Primary | ICD-10-CM

## 2021-08-05 DIAGNOSIS — Z90.410 POST-PANCREATECTOMY DIABETES (H): Primary | ICD-10-CM

## 2021-08-05 DIAGNOSIS — R79.89 ELEVATED LFTS: ICD-10-CM

## 2021-08-05 DIAGNOSIS — F43.23 ADJUSTMENT DISORDER WITH MIXED ANXIETY AND DEPRESSED MOOD: Primary | ICD-10-CM

## 2021-08-05 LAB
ALBUMIN SERPL-MCNC: 4.5 G/DL (ref 3.4–5)
ALP SERPL-CCNC: 109 U/L (ref 40–150)
ALT SERPL W P-5'-P-CCNC: 122 U/L (ref 0–50)
AST SERPL W P-5'-P-CCNC: 62 U/L (ref 0–45)
BILIRUB DIRECT SERPL-MCNC: 0.2 MG/DL (ref 0–0.2)
BILIRUB SERPL-MCNC: 1.1 MG/DL (ref 0.2–1.3)
HBA1C MFR BLD: 5.4 % (ref 0–5.7)
PROT SERPL-MCNC: 8 G/DL (ref 6.8–8.8)

## 2021-08-05 PROCEDURE — 83036 HEMOGLOBIN GLYCOSYLATED A1C: CPT | Performed by: PEDIATRICS

## 2021-08-05 PROCEDURE — 82784 ASSAY IGA/IGD/IGG/IGM EACH: CPT | Mod: 90 | Performed by: PATHOLOGY

## 2021-08-05 PROCEDURE — 80076 HEPATIC FUNCTION PANEL: CPT | Performed by: PATHOLOGY

## 2021-08-05 PROCEDURE — 82787 IGG 1 2 3 OR 4 EACH: CPT | Mod: 90 | Performed by: PATHOLOGY

## 2021-08-05 PROCEDURE — 90837 PSYTX W PT 60 MINUTES: CPT | Mod: 95 | Performed by: STUDENT IN AN ORGANIZED HEALTH CARE EDUCATION/TRAINING PROGRAM

## 2021-08-05 PROCEDURE — 99215 OFFICE O/P EST HI 40 MIN: CPT | Performed by: PEDIATRICS

## 2021-08-05 PROCEDURE — 36415 COLL VENOUS BLD VENIPUNCTURE: CPT | Performed by: PATHOLOGY

## 2021-08-05 ASSESSMENT — PAIN SCALES - GENERAL: PAINLEVEL: SEVERE PAIN (6)

## 2021-08-05 NOTE — NURSING NOTE
Chief Complaint   Patient presents with     RECHECK     4 month f/u tp-iat     Blood pressure 102/64, pulse 102, weight 64.4 kg (142 lb), SpO2 97 %, not currently breastfeeding.    Linda Rubi, CMA

## 2021-08-05 NOTE — PATIENT INSTRUCTIONS
1)  No changes to the diabetes management today.    2)  I will ask Tania and Lidya to make sure you are on the schedule for our next Wed meeting to discuss GI/pain issues, pain clinic, possible feeding tube.      Follow up  Feb 3, 1pm

## 2021-08-05 NOTE — LETTER
2021         RE: Dinora Mcghee  [address] [facility] [address]        Dear Colleague,    Thank you for referring your patient, Dinora Mcghee, to the [facility]. Please see a copy of my visit note below.    [facility]  Islet Autotransplant, Diabetes Follow Up    Problem List:  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  At the time of the procedure, the patient received:  Total Islet number: [id number].  Total Islet number/k.  Islet equivalents: [id number]  Islet equivalents/kilogram: 4091    Post-surgical course was complicated by two minor procedures for a retained foreign body near GJ site in 2017 and hernia repair 2018, and more recently by gastroparesis.  She has been insulin independent since about 1 year after surgery.    At today's visit, Dinora  remains off insulin. We had been struggling with high-low reactions due to gastroparesis diet.  She was started on acarbose with meals.  She seems to be doing much better from the diabetes side, whether that is related to treatment with acarbose or change in dietary pattern due to increasing GI symptoms is unclear.  She no longer has any time really above 180 mg/dL and rarely is below 70 (will briefly have high 60s on her CGM).    Diabetes history:  Current insulin regimen:  none    Wearing Yandy  Dates 7/23- 8/5  Avg glucose 116 mg/dL (down from 136)  TIR  is 100%, >180 is 0% (down from 9%), and <70 is 0%    Recent hemoglobin A1c levels:  Lab Results   Component Value Date    A1C 5.4 08/05/2021    A1C 5.7 05/12/2021    A1C 5.9 04/01/2021    A1C 5.5 12/17/2020    A1C 5.8 07/30/2020         Hypoglycemia history:  Minimal <70 but symptomatic lows at 80s.  The patient has had 0 episodes of severe hypoglycemia (seizure, coma, or neuroglycopenic symptoms severe enough to require assistance from another person).  Blood sugars were reviewed from the patient records and/or the meter download.      Her main struggle now is nausea, vomiting and pain.  This has been worked up fairly extensively by GI. There does not appear to be an obstruction.  She does have gastroparesis.  These symptoms are becoming worse and disabling. She had talked to GI nurse about reviewing her case at our next TPIAT meeting.  She is interested in getting a direct J-tube for enteral feeding.  She is managing pain without opioids. She does not have a pain team currently.  She does not go in for IV fluids currently though she does say she can drink water fine. 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       Review of systems:  A complete ROS is negative except as noted in HPI above.  With her gastroparesis, she does have vomiting and bloating.  She is stopping amitiza and mag citrate and starting a new motility med.    Past Medical  and Surgical History:  Past Medical History:   Diagnosis Date     Allergic rhinitis, cause unspecified      Allergy to other foods     strawberries, apples, celeries, alice, watermelon     Arthritis     left knee     Choledocholithiasis     long after cholecystectomy     Chronic abdominal pain      Chronic constipation      Chronic nausea      Chronic pancreatitis (H)      Degeneration of lumbar or lumbosacral intervertebral disc      Esophageal reflux     w/ hiatal hernia     Gastroparesis      Hiatal hernia      History of pituitary adenoma     s/p resection     Hypothyroidism      Migraines      Mild hyperlipidemia      On tube feeding diet     presence of GJ tube     Pancreatic disease     PD stricture, 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 endometriosis growth     APPENDECTOMY       C  DELIVERY ONLY       C  DELIVERY ONLY      repeat c section with incidental cystotomy with repair     C EXCIS PITUITARY,TRANSNASAL/SEPTAL      pituitary tumor removed for diabetes insipidus     C TOTAL ABDOM HYSTERECTOMY      w/ bilateral salpingoophorectomy       SECTION       COLONOSCOPY       ENDOSCOPIC RETROGRADE CHOLANGIOPANCREATOGRAM N/A 2015    Procedure: ENDOSCOPIC RETROGRADE CHOLANGIOPANCREATOGRAM;  Surgeon: Mandeep Park MD;  [facility]     ENDOSCOPIC RETROGRADE CHOLANGIOPANCREATOGRAM N/A 2016    Procedure: COMBINED ENDOSCOPIC RETROGRADE CHOLANGIOPANCREATOGRAPHY, PLACE TUBE/STENT;  Surgeon: Mandeep Park MD;  [facility]     ENDOSCOPIC RETROGRADE CHOLANGIOPANCREATOGRAM N/A 3/17/2016    Procedure: COMBINED ENDOSCOPIC RETROGRADE CHOLANGIOPANCREATOGRAPHY, REMOVE FOREIGN BODY OR STENT/TUBE;   Surgeon: Mandeep Park MD;  [facility]     ENDOSCOPIC RETROGRADE CHOLANGIOPANCREATOGRAM N/A 8/2/2016    Procedure: COMBINED ENDOSCOPIC RETROGRADE CHOLANGIOPANCREATOGRAPHY, PLACE TUBE/STENT;  Surgeon: Mandeep Park MD;  [facility]     ENDOSCOPIC RETROGRADE CHOLANGIOPANCREATOGRAM N/A 8/26/2016    Procedure: COMBINED ENDOSCOPIC RETROGRADE CHOLANGIOPANCREATOGRAPHY, REMOVE FOREIGN BODY OR STENT/TUBE;  Surgeon: Mandeep Park MD;  [facility]     ENDOSCOPIC ULTRASOUND UPPER GASTROINTESTINAL TRACT (GI) N/A 10/3/2016    Procedure: ENDOSCOPIC ULTRASOUND, ESOPHAGOSCOPY / UPPER GASTROINTESTINAL TRACT (GI);  Surgeon: Guru Jose Rodas MD;  [facility]: [facility]     ESOPHAGOSCOPY, GASTROSCOPY, DUODENOSCOPY (EGD), COMBINED N/A 6/24/2015    Procedure: COMBINED ESOPHAGOSCOPY, GASTROSCOPY, DUODENOSCOPY (EGD), REMOVE FOREIGN BODY;  Surgeon: Mandeep Park MD;  [facility]: [facility]     ESOPHAGOSCOPY, GASTROSCOPY, DUODENOSCOPY (EGD), COMBINED N/A 10/25/2015    Procedure: COMBINED ESOPHAGOSCOPY, GASTROSCOPY, DUODENOSCOPY (EGD);  Surgeon: Sammy Amaro MD;  [facility]     ESOPHAGOSCOPY, GASTROSCOPY, DUODENOSCOPY (EGD), COMBINED N/A 10/25/2015    Procedure: COMBINED ESOPHAGOSCOPY, GASTROSCOPY, DUODENOSCOPY (EGD), BIOPSY SINGLE OR MULTIPLE;  Surgeon: Sammy Amaro MD;  Location: [facility]     ESOPHAGOSCOPY, GASTROSCOPY, DUODENOSCOPY (EGD), DILATATION, COMBINED       EXCISE LESION TRUNK N/A 4/17/2017    Procedure: EXCISE LESION TRUNK;  Removal of Abdominal Foreign Body;  Surgeon: Nestor Phoenix MD;  [facility]     HC ESOPH/GAS REFLUX TEST W NASAL IMPED >1 HR N/A 11/19/2015    Procedure: ESOPHAGEAL IMPEDENCE FUNCTION TEST WITH 24 HOUR PH GREATER THAN 1 HOUR;  Surgeon: Thiago Apple MD;  Location: [facility]     HC UGI ENDOSCOPY DIAG W BIOPSY  9/17/08     HC UGI ENDOSCOPY DIAG W BIOPSY  9/27/12     HC UGI ENDOSCOPY W ESOPHAGEAL DILATION BALLOON <30MM  9/17/08      HC UGI ENDOSCOPY W EUS N/A 5/5/2015    Procedure: COMBINED ENDOSCOPIC ULTRASOUND, ESOPHAGOSCOPY, GASTROSCOPY, DUODENOSCOPY (EGD);  Surgeon: Wm Dueñas MD;  Location: [facility]     HC WRIST ARTHROSCOP,RELEASE XVERS LIG Bilateral 12/17/08     INJECT TRANSVERSUS ABDOMINIS PLANE (TAP) BLOCK BILATERAL Left 9/22/2016    Procedure: INJECT TRANSVERSUS ABDOMINIS PLANE (TAP) BLOCK BILATERAL;  Surgeon: Dickson Corrigan MD;  [facility]     laparoscopic pineda  1995     LAPAROSCOPIC HERNIORRHAPHY INCISIONAL N/A 8/23/2018    Procedure: LAPAROSCOPIC HERNIORRHAPHY INCISIONAL;  Laparoscopic Incisional Hernia Repair with Symbotex Mesh Implant;  Surgeon: Nestor Phoenix MD;  [facility]: [facility]     LAPAROSCOPIC PANCREATECTOMY, TRANSPLANT AUTO ISLET CELL N/A 12/28/2016    Procedure: LAPAROSCOPIC PANCREATECTOMY, TRANSPLANT AUTO ISLET CELL;  Surgeon: Nestor Phoenix MD;  Location: [facility]     transphenoidal pituitary resection  1990       Family History:  New changes since last visit:  none  Family History   Problem Relation Age of Onset     Eye Disorder Father         cataract, detached retina     Myocardial Infarction Father 60     Lipids Father      Cerebrovascular Disease Father      Depression Father      Substance Abuse Father      Anesthesia Reaction Father         stroke right after surgery     Cataracts Father      Osteoarthritis Father      Ulcerative Colitis Father      Lipids Mother      Hypertension Mother      Thyroid Disease Mother      Depression Mother      Angina Mother      GERD Mother      Skin Cancer Mother      Eye Disorder Son         ptosis     Eye Disorder Paternal Grandmother         cataract     Eye Disorder Paternal Grandfather         cataract     Diabetes Paternal Grandfather      Eye Disorder Maternal Grandmother         cataract     Thyroid Disease Maternal Grandmother      Coronary Artery Disease Sister         angioplasty     GERD Sister      Substance Abuse Sister      Depression Sister       Depression Son      Anxiety Disorder Son      Thyroid Disease Sister      Diabetes Maternal Grandfather      Depression Nephew      Anxiety Disorder Nephew      Thyroid Disease Nephew      Diabetes Type 2  Cousin         paternal cousin     Heart Disease Paternal Aunt      Diabetes Paternal Aunt      Diabetes Paternal Uncle      Heart Disease Paternal Uncle        Social History:  Social History     Social History Narrative     with 3 children and a dog.  No smoking, etoh or drug use.  Worked as a  for [employer] in [employer] in the past.  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     Physical Exam:  Vitals: /64   Pulse 102   Wt 64.4 kg (142 lb)   SpO2 97%   BMI 21.75 kg/m    BMI= Body mass index is 21.75 kg/m .  General:  Well-appearing, NAD  Injection sites:  Intact without lipohypertrophy  Psych:  Communicative, with normal affect         Assessment:  1.  Post pancreatectomy diabetes mellitus, s/p total pancreatectomy and islet autotransplant.  (ICD-10 E89.1)  2.  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   Gastroparesis  4.  Nausea, vomiting  5.  Abdominal pain, chronic.     Dinora is a 56 year old with history of chronic pancreatitis who is s/p total pancreatectomy and islet autotransplant.  She has been insulin independent with A1c in goal range.  However, she had hyperglycemia and reactive hypoglycemia associated with gastroparesis diet.  This seems to be better now on acarbose, but she is also eating less due to more symptoms. Will need to continue to monitor as her pain and nausea are managed.  She is discussing feeding tube w/ GI.  Will ask to put her on our meeting agenda next week per her request.      Plan:  1.  Changes to  current diabetes regimen:  Patient Instructions   1)  No changes to the diabetes management today.    2)  I will ask Tania and Lidya to make sure you are on the schedule for our next Wed meeting to discuss GI/pain issues, pain clinic, possible feeding tube.      Follow up  Feb 3, 1pm      2.  Frequency of blood sugar checks:  Keep wearing Yandy-- this is much better for Dinora than fingersticks because with fingersticks we miss the really critical data of the post prandial excursions.    3.  Continue routine follow up for autoislet transplant patients:  Mixed meal test (6 mL/kg BoostHP to max of 360 mL) at 3 months, 6 months, and once a year post transplant.  Hemoglobin A1c levels at these time points and quarterly.    4.  Other issues addressed today:  none    Follow up:  4 mos    Contact me for questions at [phone] or [phone].  Emergency number to reach pediatric endocrinology after hours is [phone].    Dr. Gloria Melissa MD  [facility] [place]  Director of Research, Islet Auto-transplant Program  Phone:  [phone]  Electronically signed: August 6, 2021 at 7:59 AM          Review of the result(s) of each unique test - HbA1c, yandy  40 minutes spent on the date of the encounter doing chart review, history and exam, documentation, downloading and reviewing CGM data,  and further activities per the note      Again, thank you for allowing me to participate in the care of your patient.        Sincerely,        Gloria Melissa MD

## 2021-08-05 NOTE — PROGRESS NOTES
"  Health Psychology                                                                                                                          Sweta Michelle, Ph.D., L.P [phone]  Melva Beebe, Ph.D., L.P. [phone]  Rachel Sloan, Ph.D. [phone]  Kenya Sanchez, Ph.D., L.P. [phone]  Estrada Patel, Ph.D., A.B.P.P., L.P. [phone]         Enedina Joaquin, Ph.D., L.P. [phone]                [facility] and [place], 3rd Floor  [address] [place] [address]     Health Psychology Follow-up Visit - Telehealth Visit     Dinora Mcghee is a 56 year old female who is being evaluated via a billable video visit.       The patient has been notified of following:      \"This video visit will be conducted via a video visit between you and your physician/provider. We have found that certain health care needs can be provided without the need for an in-person physical exam.  This service lets us provide the care you need with a video conversation.  If a prescription is necessary we can send it directly to your pharmacy.  If lab work is needed we can place an order for that and you can then stop by our lab to have the test done at a later time.     Video visits are billed at different rates depending on your insurance coverage.  Please reach out to your insurance provider with any questions.     If during the course of the call the physician/provider feels a video visit is not appropriate, you will not be charged for this service.\"     Patient has given verbal consent for Video visit? Yes     Will anyone else be joining your video visit? No    Date of Service:  8/5/21    SUBJECTIVE:  Dinora Mcghee was seen for individual psychotherapy. Reviewed emotional and physical health since our last session.    Symptoms reported: GI symptoms persist, fluctuating but overall very present. Symptoms of depression and anxiety remain, ongoing fatigue with navigating difficult " situation.  Symptoms less interfering than previous visits.     Progress towards goals: Ongoing treatment planning with medical team. She discussed more specific options for nutrition therapy with providers who were supportive in reviewing past experiences and how to make adjustments if they try this again. Weight has maintained in interim which was positive. Work going well, allowing herself to be flexible with scheduling and delegating tasks when she needs to.     Interventions utilized: Reviewed results of recent testing and medical visits. Reviewed experiences in discussing possibility of tube feedings.  Ms. Mcghee said she was grateful that there may be ways to make the experience better if she proceeds with this. Reviewed plans for upcoming trip with family and how she is expecting to navigate this (I.e. how to participate in activities as much as she can, identifying proximity of hospitals, etc).  Discussed plans to tell family about likelihood of nutritional support as the last time she did this, it was stressful for her daughter.     Ms. Mcghee was engaged throughout the visit and expressed understanding of information provided.     OBJECTIVE:  Ms. Mcghee was available for scheduled visit.  She reported sad mood, anxious.  Affect was mood- and thought-congruent.  Tearful at times.  Insight and judgment good.  Thought processes logical and linear. Speech WNL.  Denied suicidal ideation.     ASSESSMENT:  Psychological Assessment:  The PHQ-9 is an instrument for screening, diagnosing, monitoring and measuring the severity of depression. Scores of 5, 10, 15, and 20 represent cutpoints for mild, moderate, moderately severe and severe depression, respectively.   PHQ-9 SCORE 7/1/2019 6/21/2021 7/8/2021   PHQ-9 Total Score MyChart 3 (Minimal depression) 13 (Moderate depression) 13 (Moderate depression)   PHQ-9 Total Score 3 13 13       The DARCY-7 is an instrument for screening, diagnosing, monitoring and measuring  the severity of anxiety. Scores of 5, 10, and 15 represent cutpoints for mild, moderate, and severe anxiety, respectively.  DARCY-7 SCORE 1/11/2018 1/22/2018 6/21/2021   Total Score 2 (minimal anxiety) - 0 (minimal anxiety)   Total Score 2 2 0     Ongoing therapy is appropriate for Ms. Mcghee, she expresses benefit from meeting.     DIAGNOSIS:  Adjustment disorder with mixed anxiety and depressed mood.     PLAN:  Follow-up appointment in two weeks.      Type of service: Telemedicine Psychotherapy for coping with health conditions and symptoms of depression and anxiety  Time of service:   ? Date: 8/5/21  ? Time Service Began: 10:00  ? Time Service Ended: 10:55  Reason that telemedicine is appropriate: Public health regulations due to COVID-19 pandemic/state of emergency  Mode of transmission: Secure real time interactive audio and visual telecommunication system via [email or web address]  Location of originating and distant sites:  ? Originating site (patient location): Patient's home  ? Distant site (provider site): Provider's home    Rachel Sloan, PhD,   Health Psychology Fellow    Tx plan completed: 7/8/21  Tx plan due:  7/8/22      I did not see this patient directly. I have read and agree with the contents of this note. Kenya Sanchez, PhD, LP, August 24, 2021

## 2021-08-05 NOTE — TELEPHONE ENCOUNTER
Called patient to invite to clinic with Dr. Park r/t persistent abd pain after TPIAT. Left message, visit scheduled via video visit.    ML

## 2021-08-06 LAB
IGG SERPL-MCNC: 983 MG/DL (ref 610–1616)
IGG1 SER-MCNC: 437 MG/DL (ref 382–929)
IGG2 SER-MCNC: 359 MG/DL (ref 242–700)
IGG3 SER-MCNC: 62 MG/DL (ref 22–176)
IGG4 SER-MCNC: 91 MG/DL (ref 4–86)
SUBCLASSES, PERCENT: 97 %

## 2021-08-06 NOTE — PROGRESS NOTES
[facility]  Islet Autotransplant, Diabetes Follow Up    Problem List:  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  At the time of the procedure, the patient received:  Total Islet number: [id number].  Total Islet number/k.  Islet equivalents: [id number]  Islet equivalents/kilogram: 4091    Post-surgical course was complicated by two minor procedures for a retained foreign body near GJ site in 2017 and hernia repair 2018, and more recently by gastroparesis.  She has been insulin independent since about 1 year after surgery.    At today's visit, Dinora remains off insulin. We had been struggling with high-low reactions due to gastroparesis diet.  She was started on acarbose with meals.  She seems to be doing much better from the diabetes side, whether that is related to treatment with acarbose or change in  dietary pattern due to increasing GI symptoms is unclear.  She no longer has any time really above 180 mg/dL and rarely is below 70 (will briefly have high 60s on her CGM).    Diabetes history:  Current insulin regimen:  none    Wearing Yandy  Dates 7/23- 8/5  Avg glucose 116 mg/dL (down from 136)  TIR  is 100%, >180 is 0% (down from 9%), and <70 is 0%    Recent hemoglobin A1c levels:  Lab Results   Component Value Date    A1C 5.4 08/05/2021    A1C 5.7 05/12/2021    A1C 5.9 04/01/2021    A1C 5.5 12/17/2020    A1C 5.8 07/30/2020         Hypoglycemia history:  Minimal <70 but symptomatic lows at 80s.  The patient has had 0 episodes of severe hypoglycemia (seizure, coma, or neuroglycopenic symptoms severe enough to require assistance from another person).  Blood sugars were reviewed from the patient records and/or the meter download.      Her main struggle now is nausea, vomiting and pain.  This has been worked up fairly extensively by GI. There does not appear to be an obstruction.  She does have gastroparesis.  These symptoms are becoming worse and disabling. She had talked to GI nurse about reviewing her case at our next TPIAT meeting.  She is interested in getting a direct J-tube for enteral feeding.  She is managing pain without opioids. She does not have a pain team currently.  She does not go in for IV fluids currently though she does say she can drink water fine. 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       Review of systems:  A complete ROS is negative except as noted in HPI above.  With her gastroparesis, she does have vomiting and bloating.  She is stopping amitiza and mag citrate and starting a new motility med.    Past Medical and Surgical History:  Past Medical History:   Diagnosis Date     Allergic rhinitis, cause unspecified      Allergy to other foods     strawberries, apples, celeries, alice, watermelon     Arthritis     left knee     Choledocholithiasis     long after  cholecystectomy     Chronic abdominal pain      Chronic constipation      Chronic nausea      Chronic pancreatitis (H)      Degeneration of lumbar or lumbosacral intervertebral disc      Esophageal reflux     w/ hiatal hernia     Gastroparesis      Hiatal hernia      History of pituitary adenoma     s/p resection     Hypothyroidism      Migraines      Mild hyperlipidemia      On tube feeding diet     presence of GJ tube     Pancreatic disease     PD stricture, 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 endometriosis growth     APPENDECTOMY       C  DELIVERY ONLY       C  DELIVERY ONLY      repeat c section with incidental cystotomy with repair     C EXCIS PITUITARY,TRANSNASAL/SEPTAL      pituitary tumor removed for diabetes insipidus     C TOTAL ABDOM HYSTERECTOMY      w/ bilateral salpingoophorectomy       SECTION       COLONOSCOPY       ENDOSCOPIC RETROGRADE CHOLANGIOPANCREATOGRAM N/A 2015    Procedure: ENDOSCOPIC RETROGRADE CHOLANGIOPANCREATOGRAM;  Surgeon: Mandeep Park MD;  Location:  OR     ENDOSCOPIC RETROGRADE CHOLANGIOPANCREATOGRAM N/A 2016    Procedure: COMBINED ENDOSCOPIC RETROGRADE CHOLANGIOPANCREATOGRAPHY, PLACE TUBE/STENT;  Surgeon: Mandeep Park MD;  Location:  OR     ENDOSCOPIC RETROGRADE CHOLANGIOPANCREATOGRAM N/A 3/17/2016    Procedure: COMBINED ENDOSCOPIC RETROGRADE CHOLANGIOPANCREATOGRAPHY, REMOVE FOREIGN BODY OR STENT/TUBE;  Surgeon: Mandeep Park MD;  Location:  OR     ENDOSCOPIC RETROGRADE CHOLANGIOPANCREATOGRAM N/A 2016    Procedure: COMBINED ENDOSCOPIC RETROGRADE CHOLANGIOPANCREATOGRAPHY, PLACE TUBE/STENT;  Surgeon: Mandeep Park MD;  Location:  OR      ENDOSCOPIC RETROGRADE CHOLANGIOPANCREATOGRAM N/A 8/26/2016    Procedure: COMBINED ENDOSCOPIC RETROGRADE CHOLANGIOPANCREATOGRAPHY, REMOVE FOREIGN BODY OR STENT/TUBE;  Surgeon: Mandeep Park MD;  Location: [facility]     ENDOSCOPIC ULTRASOUND UPPER GASTROINTESTINAL TRACT (GI) N/A 10/3/2016    Procedure: ENDOSCOPIC ULTRASOUND, ESOPHAGOSCOPY / UPPER GASTROINTESTINAL TRACT (GI);  Surgeon: Guru Jose Rodas MD;  Location: [facility] OR     ESOPHAGOSCOPY, GASTROSCOPY, DUODENOSCOPY (EGD), COMBINED N/A 6/24/2015    Procedure: COMBINED ESOPHAGOSCOPY, GASTROSCOPY, DUODENOSCOPY (EGD), REMOVE FOREIGN BODY;  Surgeon: Mandeep Park MD;  Location: [facility] GI     ESOPHAGOSCOPY, GASTROSCOPY, DUODENOSCOPY (EGD), COMBINED N/A 10/25/2015    Procedure: COMBINED ESOPHAGOSCOPY, GASTROSCOPY, DUODENOSCOPY (EGD);  Surgeon: Sammy Amaro MD;  Location: [facility] GI     ESOPHAGOSCOPY, GASTROSCOPY, DUODENOSCOPY (EGD), COMBINED N/A 10/25/2015    Procedure: COMBINED ESOPHAGOSCOPY, GASTROSCOPY, DUODENOSCOPY (EGD), BIOPSY SINGLE OR MULTIPLE;  Surgeon: Sammy Amaro MD;  Location: [facility] GI     ESOPHAGOSCOPY, GASTROSCOPY, DUODENOSCOPY (EGD), DILATATION, COMBINED       EXCISE LESION TRUNK N/A 4/17/2017    Procedure: EXCISE LESION TRUNK;  Removal of Abdominal Foreign Body;  Surgeon: Nestor Phoenix MD;  [facility]: [facility]     HC ESOPH/GAS REFLUX TEST W NASAL IMPED >1 HR N/A 11/19/2015    Procedure: ESOPHAGEAL IMPEDENCE FUNCTION TEST WITH 24 HOUR PH GREATER THAN 1 HOUR;  Surgeon: Thiago Apple MD;  Location: [facility] GI     HC UGI ENDOSCOPY DIAG W BIOPSY  9/17/08     HC UGI ENDOSCOPY DIAG W BIOPSY  9/27/12     HC UGI ENDOSCOPY W ESOPHAGEAL DILATION BALLOON <30MM  9/17/08     HC UGI ENDOSCOPY W EUS N/A 5/5/2015    Procedure: COMBINED ENDOSCOPIC ULTRASOUND, ESOPHAGOSCOPY, GASTROSCOPY, DUODENOSCOPY (EGD);  Surgeon: Wm Dueñas MD;  Location: [facility] GI     HC WRIST ARTHROSCOP,RELEASE XVERS LIG Bilateral 12/17/08     INJECT  TRANSVERSUS ABDOMINIS PLANE (TAP) BLOCK BILATERAL Left 9/22/2016    Procedure: INJECT TRANSVERSUS ABDOMINIS PLANE (TAP) BLOCK BILATERAL;  Surgeon: Dickson Crorigan MD;  Location: [facility] OR     laparoscopic pineda  1995     LAPAROSCOPIC HERNIORRHAPHY INCISIONAL N/A 8/23/2018    Procedure: LAPAROSCOPIC HERNIORRHAPHY INCISIONAL;  Laparoscopic Incisional Hernia Repair with Symbotex Mesh Implant;  Surgeon: Nestor Phoenix MD;  Location: [facility]     LAPAROSCOPIC PANCREATECTOMY, TRANSPLANT AUTO ISLET CELL N/A 12/28/2016    Procedure: LAPAROSCOPIC PANCREATECTOMY, TRANSPLANT AUTO ISLET CELL;  Surgeon: Nestor Phoenix MD;  Location: [facility] OR     transphenoidal pituitary resection  1990       Family History:  New changes since last visit:  none  Family History   Problem Relation Age of Onset     Eye Disorder Father         cataract, detached retina     Myocardial Infarction Father 60     Lipids Father      Cerebrovascular Disease Father      Depression Father      Substance Abuse Father      Anesthesia Reaction Father         stroke right after surgery     Cataracts Father      Osteoarthritis Father      Ulcerative Colitis Father      Lipids Mother      Hypertension Mother      Thyroid Disease Mother      Depression Mother      Angina Mother      GERD Mother      Skin Cancer Mother      Eye Disorder Son         ptosis     Eye Disorder Paternal Grandmother         cataract     Eye Disorder Paternal Grandfather         cataract     Diabetes Paternal Grandfather      Eye Disorder Maternal Grandmother         cataract     Thyroid Disease Maternal Grandmother      Coronary Artery Disease Sister         angioplasty     GERD Sister      Substance Abuse Sister      Depression Sister      Depression Son      Anxiety Disorder Son      Thyroid Disease Sister      Diabetes Maternal Grandfather      Depression Nephew      Anxiety Disorder Nephew      Thyroid Disease Nephew      Diabetes Type 2  Cousin         paternal cousin     Heart Disease  Paternal Aunt      Diabetes Paternal Aunt      Diabetes Paternal Uncle      Heart Disease Paternal Uncle        Social History:  Social History     Social History Narrative     with 3 children and a dog.  No smoking, etoh or drug use.  Worked as a  for [employer] in [employer] in the past.  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     Physical Exam:  Vitals: /64   Pulse 102   Wt 64.4 kg (142 lb)   SpO2 97%   BMI 21.75 kg/m    BMI= Body mass index is 21.75 kg/m .  General:  Well-appearing, NAD  Injection sites:  Intact without lipohypertrophy  Psych:  Communicative, with normal affect         Assessment:  1.  Post pancreatectomy diabetes mellitus, s/p total pancreatectomy and islet autotransplant.  (ICD-10 E89.1)  2.  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   Gastroparesis  4.  Nausea, vomiting  5.  Abdominal pain, chronic.     Dinora is a 56 year old with history of chronic pancreatitis who is s/p total pancreatectomy and islet autotransplant.  She has been insulin independent with A1c in goal range.  However, she had hyperglycemia and reactive hypoglycemia associated with gastroparesis diet.  This seems to be better now on acarbose, but she is also eating less due to more symptoms. Will need to continue to monitor as her pain and nausea are managed.  She is discussing feeding tube w/ GI.  Will ask to put her on our meeting agenda next week per her request.      Plan:  1.  Changes to current diabetes regimen:  Patient Instructions   1)  No changes to the diabetes management today.    2)  I will ask Tania and Lidya to make sure you are on the schedule for our next Wed meeting to discuss GI/pain issues, pain clinic, possible feeding  tube.      Follow up  Feb 3, 1pm      2.  Frequency of blood sugar checks:  Keep wearing Yandy-- this is much better for Dinora than fingersticks because with fingersticks we miss the really critical data of the post prandial excursions.    3.  Continue routine follow up for autoislet transplant patients:  Mixed meal test (6 mL/kg BoostHP to max of 360 mL) at 3 months, 6 months, and once a year post transplant.  Hemoglobin A1c levels at these time points and quarterly.    4.  Other issues addressed today:  none    Follow up:  4 mos    Contact me for questions at [phone] or [phone].  Emergency number to reach pediatric endocrinology after hours is [phone].    Dr. Gloria Melissa MD  [facility]  Director of Research, Islet Auto-transplant Program  Phone:  [phone]  Electronically signed: August 6, 2021 at 7:59 AM          Review of the result(s) of each unique test - HbA1c, yandy  40 minutes spent on the date of the encounter doing chart review, history and exam, documentation, downloading and reviewing CGM data,  and further activities per the note

## 2021-08-07 ENCOUNTER — APPOINTMENT (OUTPATIENT)
Dept: GENERAL RADIOLOGY | Facility: CLINIC | Age: 56
End: 2021-08-07
Attending: EMERGENCY MEDICINE
Payer: COMMERCIAL

## 2021-08-07 ENCOUNTER — HOSPITAL ENCOUNTER (EMERGENCY)
Facility: CLINIC | Age: 56
Discharge: HOME | End: 2021-08-08
Attending: EMERGENCY MEDICINE | Admitting: EMERGENCY MEDICINE
Payer: COMMERCIAL

## 2021-08-07 VITALS
DIASTOLIC BLOOD PRESSURE: 65 MMHG | OXYGEN SATURATION: 94 % | RESPIRATION RATE: 16 BRPM | TEMPERATURE: 98.4 F | SYSTOLIC BLOOD PRESSURE: 104 MMHG | HEART RATE: 73 BPM

## 2021-08-07 DIAGNOSIS — E86.0 DEHYDRATION: ICD-10-CM

## 2021-08-07 DIAGNOSIS — R10.84 ABDOMINAL PAIN, GENERALIZED: ICD-10-CM

## 2021-08-07 LAB
ALBUMIN SERPL-MCNC: 4 G/DL (ref 3.4–5)
ALBUMIN UR-MCNC: NEGATIVE MG/DL
ALP SERPL-CCNC: 94 U/L (ref 40–150)
ALT SERPL W P-5'-P-CCNC: 92 U/L (ref 0–50)
ANION GAP SERPL CALCULATED.3IONS-SCNC: 5 MMOL/L (ref 3–14)
APPEARANCE UR: CLEAR
AST SERPL W P-5'-P-CCNC: 42 U/L (ref 0–45)
ATRIAL RATE - MUSE: 66 BPM
BASOPHILS # BLD AUTO: 0.1 10E3/UL (ref 0–0.2)
BASOPHILS NFR BLD AUTO: 1 %
BILIRUB SERPL-MCNC: 1 MG/DL (ref 0.2–1.3)
BILIRUB UR QL STRIP: NEGATIVE
BUN SERPL-MCNC: 22 MG/DL (ref 7–30)
CALCIUM SERPL-MCNC: 9.4 MG/DL (ref 8.5–10.1)
CHLORIDE BLD-SCNC: 104 MMOL/L (ref 94–109)
CO2 SERPL-SCNC: 27 MMOL/L (ref 20–32)
COLOR UR AUTO: NORMAL
CREAT SERPL-MCNC: 0.81 MG/DL (ref 0.52–1.04)
DIASTOLIC BLOOD PRESSURE - MUSE: NORMAL MMHG
EOSINOPHIL # BLD AUTO: 0.2 10E3/UL (ref 0–0.7)
EOSINOPHIL NFR BLD AUTO: 3 %
ERYTHROCYTE [DISTWIDTH] IN BLOOD BY AUTOMATED COUNT: 13.2 % (ref 10–15)
GFR SERPL CREATININE-BSD FRML MDRD: 82 ML/MIN/1.73M2
GLUCOSE BLD-MCNC: 100 MG/DL (ref 70–99)
GLUCOSE UR STRIP-MCNC: NEGATIVE MG/DL
HCT VFR BLD AUTO: 40.3 % (ref 35–47)
HGB BLD-MCNC: 13.4 G/DL (ref 11.7–15.7)
HGB UR QL STRIP: NEGATIVE
HOLD SPECIMEN: NORMAL
IMM GRANULOCYTES # BLD: 0 10E3/UL
IMM GRANULOCYTES NFR BLD: 0 %
INTERPRETATION ECG - MUSE: NORMAL
KETONES UR STRIP-MCNC: NEGATIVE MG/DL
LEUKOCYTE ESTERASE UR QL STRIP: NEGATIVE
LIPASE SERPL-CCNC: <10 U/L (ref 73–393)
LYMPHOCYTES # BLD AUTO: 2.4 10E3/UL (ref 0.8–5.3)
LYMPHOCYTES NFR BLD AUTO: 42 %
MCH RBC QN AUTO: 31.2 PG (ref 26.5–33)
MCHC RBC AUTO-ENTMCNC: 33.3 G/DL (ref 31.5–36.5)
MCV RBC AUTO: 94 FL (ref 78–100)
MONOCYTES # BLD AUTO: 0.7 10E3/UL (ref 0–1.3)
MONOCYTES NFR BLD AUTO: 13 %
NEUTROPHILS # BLD AUTO: 2.4 10E3/UL (ref 1.6–8.3)
NEUTROPHILS NFR BLD AUTO: 41 %
NITRATE UR QL: NEGATIVE
NRBC # BLD AUTO: 0 10E3/UL
NRBC BLD AUTO-RTO: 0 /100
P AXIS - MUSE: 67 DEGREES
PH UR STRIP: 5.5 [PH] (ref 5–7)
PLATELET # BLD AUTO: 356 10E3/UL (ref 150–450)
POTASSIUM BLD-SCNC: 4.1 MMOL/L (ref 3.4–5.3)
PR INTERVAL - MUSE: 146 MS
PROT SERPL-MCNC: 7.5 G/DL (ref 6.8–8.8)
QRS DURATION - MUSE: 80 MS
QT - MUSE: 416 MS
QTC - MUSE: 436 MS
R AXIS - MUSE: 34 DEGREES
RBC # BLD AUTO: 4.29 10E6/UL (ref 3.8–5.2)
RBC URINE: 0 /HPF
SODIUM SERPL-SCNC: 136 MMOL/L (ref 133–144)
SP GR UR STRIP: 1.01 (ref 1–1.03)
SYSTOLIC BLOOD PRESSURE - MUSE: NORMAL MMHG
T AXIS - MUSE: 48 DEGREES
UROBILINOGEN UR STRIP-MCNC: NORMAL MG/DL
VENTRICULAR RATE- MUSE: 66 BPM
WBC # BLD AUTO: 5.7 10E3/UL (ref 4–11)
WBC URINE: <1 /HPF

## 2021-08-07 PROCEDURE — 250N000011 HC RX IP 250 OP 636: Performed by: EMERGENCY MEDICINE

## 2021-08-07 PROCEDURE — 258N000003 HC RX IP 258 OP 636: Performed by: EMERGENCY MEDICINE

## 2021-08-07 PROCEDURE — 99285 EMERGENCY DEPT VISIT HI MDM: CPT | Performed by: EMERGENCY MEDICINE

## 2021-08-07 PROCEDURE — 93005 ELECTROCARDIOGRAM TRACING: CPT

## 2021-08-07 PROCEDURE — 80053 COMPREHEN METABOLIC PANEL: CPT | Performed by: STUDENT IN AN ORGANIZED HEALTH CARE EDUCATION/TRAINING PROGRAM

## 2021-08-07 PROCEDURE — 83690 ASSAY OF LIPASE: CPT | Performed by: STUDENT IN AN ORGANIZED HEALTH CARE EDUCATION/TRAINING PROGRAM

## 2021-08-07 PROCEDURE — 85004 AUTOMATED DIFF WBC COUNT: CPT | Performed by: STUDENT IN AN ORGANIZED HEALTH CARE EDUCATION/TRAINING PROGRAM

## 2021-08-07 PROCEDURE — 81001 URINALYSIS AUTO W/SCOPE: CPT | Performed by: EMERGENCY MEDICINE

## 2021-08-07 PROCEDURE — 96374 THER/PROPH/DIAG INJ IV PUSH: CPT

## 2021-08-07 PROCEDURE — 74019 RADEX ABDOMEN 2 VIEWS: CPT | Mod: 26 | Performed by: RADIOLOGY

## 2021-08-07 PROCEDURE — 99285 EMERGENCY DEPT VISIT HI MDM: CPT | Mod: 25

## 2021-08-07 PROCEDURE — 74019 RADEX ABDOMEN 2 VIEWS: CPT

## 2021-08-07 PROCEDURE — 36415 COLL VENOUS BLD VENIPUNCTURE: CPT | Performed by: STUDENT IN AN ORGANIZED HEALTH CARE EDUCATION/TRAINING PROGRAM

## 2021-08-07 PROCEDURE — 96361 HYDRATE IV INFUSION ADD-ON: CPT

## 2021-08-07 RX ADMIN — SODIUM CHLORIDE, POTASSIUM CHLORIDE, SODIUM LACTATE AND CALCIUM CHLORIDE 1000 ML: 600; 310; 30; 20 INJECTION, SOLUTION INTRAVENOUS at 22:54

## 2021-08-07 RX ADMIN — SODIUM CHLORIDE 1000 ML: 9 INJECTION, SOLUTION INTRAVENOUS at 21:20

## 2021-08-07 RX ADMIN — PROCHLORPERAZINE EDISYLATE 10 MG: 5 INJECTION INTRAMUSCULAR; INTRAVENOUS at 21:21

## 2021-08-08 NOTE — ED TRIAGE NOTES
Arrives ambulatory to triage c/o abdominal pain, suspects 2/2 gastroparesis. Suspects is dehydrated, difficulty with intake lately. Pain has been ongoing, now radiating to her back and intensified which prompted her to come in. BMs x4 today. Difficulty with urination today as well. Predominantly upper abdomen

## 2021-08-08 NOTE — ED PROVIDER NOTES
ED Provider Note  [facility] EMERGENCY DEPARTMENT ([facility])  August 7, 2021    History   No chief complaint on file.    HPI  Dinora Mcghee is a 56 year old female with a past medical history including s/p laparoscopic pancreatectomy transplant auto islet cell (2016), diabetes insipidus, gastroparesis, GERD, hypoglycemia, hypothyroidism who presents to the Emergency Department for evaluation of upper abdominal pain.  She reports that she chronically has upper abdominal pain, however today it was worse.  It is in the upper abdomen and radiates to her back.  She has nausea with no vomiting as of yet.  She has been trying to be on a liquid diet for the past few days.  She took Phenergan at home.  She has no fever or chills.  She has no melena or bright red blood per rectum.  She had 4 small bowel movements today without diarrhea.  She has no dysuria or urinary frequency.    Past Medical History  Past Medical History:   Diagnosis Date     Allergic rhinitis, cause unspecified      Allergy to other foods     strawberries, apples, celeries, alice, watermelon     Arthritis     left knee     Choledocholithiasis     long after cholecystectomy     Chronic abdominal pain      Chronic constipation      Chronic nausea      Chronic pancreatitis (H)      Degeneration of lumbar or lumbosacral intervertebral disc      Esophageal reflux     w/ hiatal hernia     Gastroparesis      Hiatal hernia      History of pituitary adenoma     s/p resection     Hypothyroidism      Migraines      Mild hyperlipidemia      On tube feeding diet     presence of GJ tube     Pancreatic disease     PD stricture, 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  endometriosis growth     APPENDECTOMY       C  DELIVERY ONLY       C  DELIVERY ONLY      repeat c section with incidental cystotomy with repair     C EXCIS PITUITARY,TRANSNASAL/SEPTAL  1980    pituitary tumor removed for diabetes insipidus     C TOTAL ABDOM HYSTERECTOMY      w/ bilateral salpingoophorectomy       SECTION       COLONOSCOPY       ENDOSCOPIC RETROGRADE CHOLANGIOPANCREATOGRAM N/A 2015    Procedure: ENDOSCOPIC RETROGRADE CHOLANGIOPANCREATOGRAM;  Surgeon: Mandeep Park MD;  Location: [facility]     ENDOSCOPIC RETROGRADE CHOLANGIOPANCREATOGRAM N/A 2016    Procedure: COMBINED ENDOSCOPIC RETROGRADE CHOLANGIOPANCREATOGRAPHY, PLACE TUBE/STENT;  Surgeon: Mandeep Park MD;  [facility]     ENDOSCOPIC RETROGRADE CHOLANGIOPANCREATOGRAM N/A 3/17/2016    Procedure: COMBINED ENDOSCOPIC RETROGRADE CHOLANGIOPANCREATOGRAPHY, REMOVE FOREIGN BODY OR STENT/TUBE;  Surgeon: Mandeep Park MD;  Location: [facility]     ENDOSCOPIC RETROGRADE CHOLANGIOPANCREATOGRAM N/A 2016    Procedure: COMBINED ENDOSCOPIC RETROGRADE CHOLANGIOPANCREATOGRAPHY, PLACE TUBE/STENT;  Surgeon: Mandeep Park MD;  [facility]     ENDOSCOPIC RETROGRADE CHOLANGIOPANCREATOGRAM N/A 2016    Procedure: COMBINED ENDOSCOPIC RETROGRADE CHOLANGIOPANCREATOGRAPHY, REMOVE FOREIGN BODY OR STENT/TUBE;  Surgeon: Mandeep Park MD;  [facility]     ENDOSCOPIC ULTRASOUND UPPER GASTROINTESTINAL TRACT (GI) N/A 10/3/2016    Procedure: ENDOSCOPIC ULTRASOUND, ESOPHAGOSCOPY / UPPER GASTROINTESTINAL TRACT (GI);  Surgeon: Guru Jose Rodas MD;  [facility]     ESOPHAGOSCOPY, GASTROSCOPY, DUODENOSCOPY (EGD), COMBINED N/A 2015    Procedure: COMBINED ESOPHAGOSCOPY, GASTROSCOPY, DUODENOSCOPY (EGD), REMOVE FOREIGN BODY;  Surgeon: Mandeep Park MD;  [facility]     ESOPHAGOSCOPY, GASTROSCOPY, DUODENOSCOPY (EGD), COMBINED N/A 10/25/2015     Procedure: COMBINED ESOPHAGOSCOPY, GASTROSCOPY, DUODENOSCOPY (EGD);  Surgeon: Sammy Amaro MD;  [facility]     ESOPHAGOSCOPY, GASTROSCOPY, DUODENOSCOPY (EGD), COMBINED N/A 10/25/2015    Procedure: COMBINED ESOPHAGOSCOPY, GASTROSCOPY, DUODENOSCOPY (EGD), BIOPSY SINGLE OR MULTIPLE;  Surgeon: Sammy Amaro MD;  [facility]     ESOPHAGOSCOPY, GASTROSCOPY, DUODENOSCOPY (EGD), DILATATION, COMBINED       EXCISE LESION TRUNK N/A 4/17/2017    Procedure: EXCISE LESION TRUNK;  Removal of Abdominal Foreign Body;  Surgeon: Nestor Phoenix MD;  [facility]     HC ESOPH/GAS REFLUX TEST W NASAL IMPED >1 HR N/A 11/19/2015    Procedure: ESOPHAGEAL IMPEDENCE FUNCTION TEST WITH 24 HOUR PH GREATER THAN 1 HOUR;  Surgeon: Thiago Apple MD;  Location: [facility] GI     HC UGI ENDOSCOPY DIAG W BIOPSY  9/17/08     HC UGI ENDOSCOPY DIAG W BIOPSY  9/27/12     HC UGI ENDOSCOPY W ESOPHAGEAL DILATION BALLOON <30MM  9/17/08     HC UGI ENDOSCOPY W EUS N/A 5/5/2015    Procedure: COMBINED ENDOSCOPIC ULTRASOUND, ESOPHAGOSCOPY, GASTROSCOPY, DUODENOSCOPY (EGD);  Surgeon: Wm Dueñas MD;  Location: [facility]     HC WRIST ARTHROSCOP,RELEASE XVERS LIG Bilateral 12/17/08     INJECT TRANSVERSUS ABDOMINIS PLANE (TAP) BLOCK BILATERAL Left 9/22/2016    Procedure: INJECT TRANSVERSUS ABDOMINIS PLANE (TAP) BLOCK BILATERAL;  Surgeon: Dickson Corrigan MD;  [facility]     laparoscopic pineda  1995     LAPAROSCOPIC HERNIORRHAPHY INCISIONAL N/A 8/23/2018    Procedure: LAPAROSCOPIC HERNIORRHAPHY INCISIONAL;  Laparoscopic Incisional Hernia Repair with Symbotex Mesh Implant;  Surgeon: Nestor Phoenix MD;  [facility]: [facility]     LAPAROSCOPIC PANCREATECTOMY, TRANSPLANT AUTO ISLET CELL N/A 12/28/2016    Procedure: LAPAROSCOPIC PANCREATECTOMY, TRANSPLANT AUTO ISLET CELL;  Surgeon: Nestor Phoenix MD;  Location: [facility]     transphenoidal pituitary resection  1990     acarbose (PRECOSE) 50 MG tablet  amitriptyline (ELAVIL) 10 MG  tablet  amylase-lipase-protease (CREON 24) 55925-13499 units CPEP per EC capsule  azelastine (ASTELIN) 0.1 % nasal spray  blood glucose (PAT CONTOUR NEXT) test strip  blood glucose monitoring (PAT MICROLET) lancets  cetirizine (ZYRTEC) 10 MG tablet  Continuous Blood Gluc Sensor (FREESTYLE LISA 2 SENSOR) MISC  diphenhydrAMINE (BENADRYL ALLERGY) 25 MG capsule  estradiol (VAGIFEM) 10 MCG TABS vaginal tablet  famotidine (PEPCID) 20 MG tablet  FOLIC ACID PO  glucose 40 % GEL gel  ibuprofen (ADVIL/MOTRIN) 400 MG tablet  levothyroxine (SYNTHROID) 137 MCG tablet  montelukast (SINGULAIR) 10 MG tablet  multivitamin CF formula (CHOICEFUL) capsule  omeprazole (PRILOSEC) 40 MG DR capsule  order for DME  polyethylene glycol (MIRALAX) 17 GM/Dose powder  prochlorperazine (COMPAZINE) 5 MG tablet  promethazine (PHENERGAN) 25 MG tablet  Prucalopride Succinate (MOTEGRITY) 2 MG TABS  scopolamine (TRANSDERM) 1 MG/3DAYS 72 hr patch  senna-docusate (SENOKOT-S;PERICOLACE) 8.6-50 MG per tablet  Sharps Container (BD SHARPS ) MISC  ZOLMitriptan (ZOMIG) 5 MG tablet      Allergies   Allergen Reactions     Apple Anaphylaxis     Corticosteroids Other (See Comments)     All oral, IV and injectable steroids are contraindicated (unless in life threatening situations) in Islet Auto transplant recipients. They can cause irreversible loss of islet cell function. Please contact patient's transplant care coordinator ADI Gaffney RN at [phone]/pager [phone] and/or endocrinologist prior to administration.       Depakote [Divalproex Sodium] Other (See Comments)     Chest pain     Zithromax [Azithromycin Dihydrate] Anaphylaxis     Noted in 4/7/08 ER     Darvocet [Propoxyphene N-Apap] Itching     Morphine Nausea and Vomiting and Rash     Nalbuphine Hcl Rash     RASH :nubaine     Zosyn [Piperacillin-Tazobactam In D5w] Rash     Possible allergy, did have a diffuse rash that seemed drug related but could have also been related to soap in  the hospital.      Bactrim [Sulfamethoxazole W-Trimethoprim] Other (See Comments) and Nausea and Vomiting     Severely low liver function.     Cats      Compazine [Prochlorperazine] Other (See Comments)     Twitching. Takes Benedryl and is fine     Dust Mites      Grass      Prednisone Other (See Comments)     Insomnia       Ragweeds      Tape [Adhesive Tape] Blisters     Tramadol      Trees      Zofran [Ondansetron] Other (See Comments)     migraine     Flagyl [Metronidazole] Hives and Rash     Past medical history, past surgical history, medications, and allergies were reviewed with the patient. Additional pertinent items: diabetes insipidus retrieved from [facility] through [facility] Everywhere.    Family History  Family History   Problem Relation Age of Onset     Eye Disorder Father         cataract, detached retina     Myocardial Infarction Father 60     Lipids Father      Cerebrovascular Disease Father      Depression Father      Substance Abuse Father      Anesthesia Reaction Father         stroke right after surgery     Cataracts Father      Osteoarthritis Father      Ulcerative Colitis Father      Lipids Mother      Hypertension Mother      Thyroid Disease Mother      Depression Mother      Angina Mother      GERD Mother      Skin Cancer Mother      Eye Disorder Son         ptosis     Eye Disorder Paternal Grandmother         cataract     Eye Disorder Paternal Grandfather         cataract     Diabetes Paternal Grandfather      Eye Disorder Maternal Grandmother         cataract     Thyroid Disease Maternal Grandmother      Coronary Artery Disease Sister         angioplasty     GERD Sister      Substance Abuse Sister      Depression Sister      Depression Son      Anxiety Disorder Son      Thyroid Disease Sister      Diabetes Maternal Grandfather      Depression Nephew      Anxiety Disorder Nephew      Thyroid Disease Nephew      Diabetes Type 2  Cousin         paternal cousin     Heart Disease Paternal Aunt       Diabetes Paternal Aunt      Diabetes Paternal Uncle      Heart Disease Paternal Uncle      Family history was reviewed with the patient. Additional pertinent items: None    Social History  Social History     Tobacco Use     Smoking status: Former Smoker     Packs/day: 0.50     Years: 6.00     Pack years: 3.00     Types: Cigarettes     Start date: 1985     Quit date: 1992     Years since quittin.6     Smokeless tobacco: Never Used     Tobacco comment: no 2nd hand   Substance Use Topics     Alcohol use: Not Currently     Alcohol/week: 3.0 - 6.0 standard drinks     Types: 1 - 2 Glasses of wine, 1 - 2 Cans of beer, 1 - 2 Shots of liquor per week     Comment: none since IGG     Drug use: No      Social history was reviewed with the patient. Additional pertinent items: None      Review of Systems  A complete review of systems was performed with pertinent positives and negatives noted in the HPI, and all other systems negative.    Physical Exam   BP: 114/77  Pulse: 98  Temp: 98.4  F (36.9  C)  Resp: 14  SpO2: 100 %  Physical Exam  Constitutional:       General: She is not in acute distress.     Appearance: She is well-developed.   HENT:      Head: Normocephalic and atraumatic.   Eyes:      Conjunctiva/sclera: Conjunctivae normal.      Pupils: Pupils are equal, round, and reactive to light.   Neck:      Thyroid: No thyromegaly.      Trachea: No tracheal deviation.   Cardiovascular:      Rate and Rhythm: Normal rate and regular rhythm.      Heart sounds: Normal heart sounds. No murmur heard.     Pulmonary:      Effort: Pulmonary effort is normal. No respiratory distress.      Breath sounds: Normal breath sounds. No wheezing.   Chest:      Chest wall: No tenderness.   Abdominal:      General: There is no distension.      Palpations: Abdomen is soft.      Tenderness: There is abdominal tenderness in the right upper quadrant, epigastric area and left upper quadrant. There is no guarding or rebound.    Musculoskeletal:         General: No tenderness.      Cervical back: Normal range of motion and neck supple.   Skin:     General: Skin is warm.      Findings: No rash.   Neurological:      Mental Status: She is alert and oriented to person, place, and time.      Sensory: No sensory deficit.   Psychiatric:         Behavior: Behavior normal.       ED Course      Procedures       An EKG was performed.  It was read by me at 9:01 PM.  It shows a normal sinus rhythm with a rate of 66.  There are no acute ST-T segment abnormalities.  This was compared to an EKG from July 2018 and there were no significant changes.     Results for orders placed or performed during the hospital encounter of 08/07/21   XR Abdomen 2 Views     Status: None    Narrative    EXAM: XR ABDOMEN 2VIEWS  LOCATION: [facility]  DATE/TIME: 8/7/2021 9:43 PM    INDICATION: abd pain, nausea  COMPARISON: 07/20/2021      Impression    IMPRESSION: Postsurgical change upper abdomen. Several mildly distended loops of small bowel in the left upper quadrant. No significant air-fluid levels. Lung bases clear.    Comprehensive metabolic panel     Status: Abnormal   Result Value Ref Range    Sodium 136 133 - 144 mmol/L    Potassium 4.1 3.4 - 5.3 mmol/L    Chloride 104 94 - 109 mmol/L    Carbon Dioxide (CO2) 27 20 - 32 mmol/L    Anion Gap 5 3 - 14 mmol/L    Urea Nitrogen 22 7 - 30 mg/dL    Creatinine 0.81 0.52 - 1.04 mg/dL    Calcium 9.4 8.5 - 10.1 mg/dL    Glucose 100 (H) 70 - 99 mg/dL    Alkaline Phosphatase 94 40 - 150 U/L    AST 42 0 - 45 U/L    ALT 92 (H) 0 - 50 U/L    Protein Total 7.5 6.8 - 8.8 g/dL    Albumin 4.0 3.4 - 5.0 g/dL    Bilirubin Total 1.0 0.2 - 1.3 mg/dL    GFR Estimate 82 >60 mL/min/1.73m2   Lipase     Status: Abnormal   Result Value Ref Range    Lipase <10 (L) 73 - 393 U/L   CBC with platelets and differential     Status: None   Result Value Ref Range    WBC Count 5.7 4.0 - 11.0 10e3/uL    RBC Count  4.29 3.80 - 5.20 10e6/uL    Hemoglobin 13.4 11.7 - 15.7 g/dL    Hematocrit 40.3 35.0 - 47.0 %    MCV 94 78 - 100 fL    MCH 31.2 26.5 - 33.0 pg    MCHC 33.3 31.5 - 36.5 g/dL    RDW 13.2 10.0 - 15.0 %    Platelet Count 356 150 - 450 10e3/uL    % Neutrophils 41 %    % Lymphocytes 42 %    % Monocytes 13 %    % Eosinophils 3 %    % Basophils 1 %    % Immature Granulocytes 0 %    NRBCs per 100 WBC 0 <1 /100    Absolute Neutrophils 2.4 1.6 - 8.3 10e3/uL    Absolute Lymphocytes 2.4 0.8 - 5.3 10e3/uL    Absolute Monocytes 0.7 0.0 - 1.3 10e3/uL    Absolute Eosinophils 0.2 0.0 - 0.7 10e3/uL    Absolute Basophils 0.1 0.0 - 0.2 10e3/uL    Absolute Immature Granulocytes 0.0 <=0.0 10e3/uL    Absolute NRBCs 0.0 10e3/uL   UA with Microscopic reflex to Culture     Status: Normal    Specimen: Urine, Midstream   Result Value Ref Range    Color Urine Straw Colorless, Straw, Light Yellow, Yellow    Appearance Urine Clear Clear    Glucose Urine Negative Negative mg/dL    Bilirubin Urine Negative Negative    Ketones Urine Negative Negative mg/dL    Specific Gravity Urine 1.009 1.003 - 1.035    Blood Urine Negative Negative    pH Urine 5.5 5.0 - 7.0    Protein Albumin Urine Negative Negative mg/dL    Urobilinogen Urine Normal Normal, 2.0 mg/dL    Nitrite Urine Negative Negative    Leukocyte Esterase Urine Negative Negative    RBC Urine 0 <=2 /HPF    WBC Urine <1 <=5 /HPF    Narrative    Urine Culture not indicated   EKG 12-lead, tracing only     Status: None (Preliminary result)   Result Value Ref Range    Systolic Blood Pressure  mmHg    Diastolic Blood Pressure  mmHg    Ventricular Rate 66 BPM    Atrial Rate 66 BPM    GA Interval 146 ms    QRS Duration 80 ms     ms    QTc 436 ms    P Axis 67 degrees    R AXIS 34 degrees    T Axis 48 degrees    Interpretation ECG       Sinus rhythm  Cannot rule out Anterior infarct , age undetermined  Abnormal ECG       Medications   0.9% sodium chloride BOLUS (1,000 mLs Intravenous New Bag 8/7/21  2120)   prochlorperazine (COMPAZINE) injection 10 mg (10 mg Intravenous Given 8/7/21 2121)        Assessments & Plan (with Medical Decision Making)   56-year-old female with a history of laparoscopic pancreatectomy transplant auto islet cell, diabetes insipidus, and gastroparesis who presents for increased upper abdominal pain over the past day.  She has home medications for nausea.  She avoids narcotics secondary to her constipating effects.  She is wondering if she is dehydrated.    On arrival, patient has a blood pressure 114/77 with a pulse rate of 98 and temperature of 98.4.  Patient's respirations are 14 with O2 saturations of 100%.  She has tenderness in the upper abdomen without rebound or guarding.  IV was established and fluids were given.  She was also given Compazine IV.  Labs and imaging were ordered.    Patient's white count of 5.7 with a hemoglobin of 13.4.  Comprehensive metabolic profile was remarkable for an ALT of 92 and was otherwise normal.  Lipase was less than 10.    An abdominal x-ray was obtained which showed several mildly distended loops in the left upper quadrant without evidence of free air or obstruction.    EKG showed no evidence of acute ischemia.    Urinalysis showed no evidence of infection or renal colic.    Patient was given IV fluids and Compazine with improved symptoms.  She requested discharge.  She has already taking medications at home and will continue those.  She will contact her primary care provider.    I have reviewed the nursing notes. I have reviewed the findings, diagnosis, plan and need for follow up with the patient.    Discharge Medication List as of 8/7/2021 11:43 PM          Final diagnoses:   Abdominal pain, generalized   Dehydration       --  Prabhjot Davila  [facility] EMERGENCY DEPARTMENT  8/7/2021     Prabhjot Davila MD  08/09/21 [id number]

## 2021-08-09 ENCOUNTER — VIRTUAL VISIT (OUTPATIENT)
Dept: GASTROENTEROLOGY | Facility: CLINIC | Age: 56
End: 2021-08-09
Payer: COMMERCIAL

## 2021-08-09 VITALS — WEIGHT: 140 LBS | BODY MASS INDEX: 21.44 KG/M2

## 2021-08-09 DIAGNOSIS — G89.4 CHRONIC PAIN SYNDROME: Primary | ICD-10-CM

## 2021-08-09 DIAGNOSIS — K31.84 GASTROPARESIS: ICD-10-CM

## 2021-08-09 PROCEDURE — 99215 OFFICE O/P EST HI 40 MIN: CPT | Mod: 95 | Performed by: INTERNAL MEDICINE

## 2021-08-09 NOTE — NURSING NOTE
Chief Complaint   Patient presents with     New Patient       Vitals:    08/09/21 1148   Weight: 63.5 kg (140 lb)       Body mass index is 21.44 kg/m .    Saundra Jaquez CMA

## 2021-08-09 NOTE — LETTER
8/9/2021         RE: Dinora Mcghee  [address] [facility] [address]        Dear Colleague,    Thank you for referring your patient, Dinora Mcghee, to the [facility] [place]. Please see a copy of my visit note below.    Dinora is a 56-year-old very well-known to me from years past who is 5 years post TPI AT and referred back to see us by Dr. Genao for intractable abdominal pain, bloating and weight loss.  We spent 1 hour and more on a video call reviewing her entire history from the start and leading up to TPI AT.  We reviewed that she actually did quite well for several years but that over the last year has developed intractable bloating food intolerance, weight loss dehydration and abdominal pain.  Dr. NALDO damon is copied and pasted below which is excellent summary of her post TPI AT issues.  Her quality of life is severely impaired by the symptoms and she feels weak to the point that she can barely walk distances whereas she used to work out at the [facility] on a daily basis up till a couple of years ago.  She is also barely maintaining her weight which is down to 140 pounds with a BMI of 21.95.  Eating just about anything even boost shakes provokes bloating and fullness and discomfort and solid foods are pretty much out of the question.  She has tried multiple dietary manipulations.  As mentioned she has a past history of diagnosed gastroparesis which I made in 2016 with a T1 half 268 minutes.  She still manages to be off insulin with well-controlled blood sugars and hypoglycemia is controlled with acarbose.    Also has elevated LFTs of unclear cause. Following w hepatology. 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 Serum IgG4 levels followed regularly, but marginally elevated above ULN only. MRCP arranged.    We spent over 1 hour in video consultation discussing that it seems most likely that her symptoms are due to severe gastroparesis.  She cannot tolerate  metoclopramide similar to the majority of patients.  She has not been tried on domperidone.  She is losing weight and debilitated.  She and I agreed she would benefit from IV outpatient infusion protocol of lactated Ringer's but without Zofran as she does not tolerate that or is allergic to it and this can be done at [facility] which is very close to her home and does not need to be done at a [place] facility.  We also discussed that domperidone can be obtained through an I&D and I think that would be worth exploring but that ultimately she may benefit from a G BALDOMERO M.  Nobody is doing that at our facility and the place with the most experience is [facility].    First step will be to repeat a gastric emptying study and restart IV infusion therapy [facility].  This patient scheduled to leave for a week long driving trip to [place] to visit the rest of her family including all 3 children.  I do suggest that she have the local hospital in [facility] contact us if she needs IV infusion out there.  I cautioned her not to over exert herself and make sure she avoids dehydration.    Finally we both agreed that she should have a GJ tube replaced.  She had the initial G-tube placed by interventional radiology at [facility] and it was far in the left upper quadrant which is the least optimal site and resulted in at least 4 episodes of GJ tube flipping back.  Unfortunately she had a very traumatic experience with our IR docs taking over an hour to reposition the GJ tube without any IV sedation.  This prompted her to require health psychology counseling for post traumatic traumatic stress.  I reassured her that if we do a PEG J we will do it under general anesthesia and I will do it to position that as centrally or is close to the pylorus as possible which will probably be some distance from the previous site which was very irritating near the costal margin.  That will have less chance of the tube dislodging  from the jejunum back into the stomach.  I also reassured her that when we exchanged GJ tubes we do them with deep sedation either Versed fentanyl in the endoscopy unit, MAC but that it should provide amnesia so that she should not have to fear GJ tube drainage.  Ultimately I think she may benefit from a  problem or domperidone or both and I will see what I can do to arrange that outside our facility perhaps at [facility]    If her gastric emptying study is normal route were going to have greater difficulty finding a long-term solution but we will work with her and I think she felt reassured that we have a treatment plan for her and can at least improve her quality of life which is now quite miserable      1) IV infusion protocol, at [place]  2) Gastric emptying study  3) Schedule PEGJ w Dr Park in next 2 weeks if possible  4) Follow-up w Dr Park 2 weeks after GJ, research GPOEM and domperidone options if GES shows prolonged emptying    Mandeep Park MD  Gastroenterology, Pancreas and Biliary Disorders  [facility]       GI CLINIC VISIT     CC:  Follow-up        ASSESSMENT/PLAN:  (R10.84) Abdominal pain, generalized  (primary encounter diagnosis)  (R63.4) Weight loss  (K31.84) Gastroparesis  (K59.00) Constipation, unspecified constipation type  (Z94.83) Status post pancreas transplantation (H)     Pain with eating solids, improved somewhat with prior dietary changes/transition to liquid-only diet, but now having pain, fullness, and nausea even with only liquid diet. Having infrequent vomiting with round-the-clock scopolamine.      Suspect this is 2/2 gastroparesis, but will investigate as below. Note no prior evidence of obstruction, normal CTE and normal SBFT. Even with bowel cleanout no improvement in symptoms.Has not had recent EGD, but PUD is lower on differential (potentially could pursue endoscopic exam at time of J-tube placement, see below).      For GP: No prior response to  metoclopramide. Prior anaphylaxis to azithromycin (2008 during an ED visit) so not able to use this or erythromycin as a prokinetic. We discussed bypassing stomach and feeding small bowel if able, and she is more open to this at this time. Would prefer a direct-jejunostomy (many prior issues with G-J with J-tube coiling and related PTSD, even with repositioning would have some concern this could happen in setting of repeated retching/vomiting). Currently maintaining weight and hydration for the last month, but working hard to do so and still quite symptomatic (did lose 16 lbs prior to that). Buspar for gastric accomodation a possibility, but data limited in GP and patient symptoms warrant nutritional support.     Will send for CTA to evaluate for mesenteric ischemia in post-surgical abdomen.   Will also ask that she be seen in Pancreas Clinic as well to ensure no other post-TPIAT complications they'd like to evaluate for and for input on potential jejunostomy (? Preference from her team for endoscopically vs surgically placed).     For constipation, BMs are in a reasonable place - going about 2-3x/week with soft consistency. Offerred switch off of Motegrity due to report of HA, pt declined at this time since BMs are doing ok. If this needs to be stopped, we discussed a possible re-trial of Trulance --> would do a bowel cleanout first, then start Trulance and may be able to avoid marked bowel emptying with start of med (potentially avoid nighttime incontinence which occurred during first couple days of starting Trulance in the past).         - continue with liquid only diet as you are  - consider a trial of Boost Breezmunira Tipjoy in addition to the shakes you're already using  - continue with low fiber and low fat loads to stomach  - schedule a CT angio at your earliest convenience  - will check in with your pancreas team to see if there are any post-surgical contributors they'd like to assess for  - would like to  start small bowel feedings ---> will check in with your pancreas team  - acetaminophen ok for headaches  - continue scopolamine, ok for prn compazine for breakthrough nausea  - continue your efforts to hydrate  - IVF infusions if unable to keep up on liquids  - continue current bowel regimen  - follow-up with hepatology and rheumatology as planned  - follow-up in GI clinic as scheduled with Dr. Genao           It was a pleasure to participate in the care of this patient; please contact us with any further questions.  A total of 45 face-to-face minutes was spent with this patient, >50% of which was counseling regarding the above delineated issues. An additional 40 minutes was spent on the date of the encounter doing chart review, documentation, care coordination, and further activities as noted above.     Vijaya Genao MD   of Medicine  Division of Gastroenterology, Hepatology and Nutrition  [facility]     ---------------------------------------------------------------------------------------------------  HPI:     Ms. 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 She was initially seen in general GI clinic by this writer on 11/1/2017 and has been seen by myself and ANA Gan since that time. Her pancreas txplt surgeon was Dr. Phoenix; she now follows with Dr. Honeycutt. She previously followed with Dr. Park of GI in Pancreas-Biliary Clinic.     History summarized and updated from previous documentation from ANA Gan and myself. Please see previous notes for further details      Gastroparesis  Gastric  emptying study with 2-hour study at [facility] have been consistent with gastroparesis, patient had follow-up study here 6/27/2016 with 49% remaining at 4 hours (?on pain meds at the time).  Had GJ tube in fall 2016, NG tube used for venting with tube feeds and was able to wean off of tube feeds after TPIAT.  Patient has used several medication for the symptoms including scopolamine patch, prochlorperazine, promethazine which helped symptoms.  She has also been seen by neurology and psychology due to concern that she has a difficult time sleeping and with migraines.  At previous visits, reported nausea with occasional episodes of vomiting.  Her regimen was previously well managed with Compazine in the morning, Phenergan at night.  She did note that nausea and vomiting worsened with migraines.     Patient reports that she had been feeling pretty well up until early November.  Early November the patient noted worsening symptoms.  He was seen at the [facility] emergency department on November 18.  At that time, blood work notable for normal BMP, low lipase, unremarkable hepatic panel.  CBC without elevation of WBCs though she did have elevated platelets, neutrophils and lymphocytes.  CT at that time with moderate stool burden, nonspecific fluid in small intestine.  No evidence of obstruction per documentation. Had similar episodes after this. This had improved with juicing foods/liquid-only diet. Was tolerating some solids aiming for 6-7 small meals a day with about a cup of food at each meal.  States that foods such as toast, bread, gluten seem to go better than healthier foods.  Initially these dietary changes seemed to help, but over time the response has waned. From winter to May/June she lost about 16 lbs (from 156-->140 lbs). She continued to experience more pronounced post-prandial pain and fullness and had vomiting of food after eating.      Today, Ms. Loo believes weight has been stable around  140 lbs for a month or so. Her current diet is mostly:  Drinks 70 fl oz of water/24 hours  Also 1 V8 drink, 1 smoothie, 2 Ensure shakes --> thinks about 1200 kevin/day  Occasionally with try a soft solid like rice cereal or yogurt, but lately this has consistently led to abdominal pain. Liquids also lead to pain and abdominal fullness, but she drinks them slowly and tries to push through the discomfort. Nausea is constant, but helped a good deal by scopolamine patch. She wonders what she can use for breakthrough nausea. Vomiting is much less frequent with scopolamine (last episode over a week ago). Urine is clear to light yellow so she feels she's hydrated.      Beyond dietary interventions for GP, she has tried metoclopramide with no response. Has not tried erythromycin or azithromycin due to anaphylactic event with prior azithromycin use (in ED in 2008).  Symptoms did not improve with bowel cleanout. Another course of rifaximin (previously successful at addressing bloating and stool issues) was not helpful for her pain, bloating/fullness, and n/v. Amitriptyline was increased to 60 mg nightly with no change in discomfort.      Constipation/irregular bowel movements/bloating  Patient reports history of ongoing constipation for over 20 years after having her first child.  She has tried multiple interventions including a bowel cleanout, MiraLAX, senna, milk of magnesium, Linzess, and Amitiza for which she was on when she first came to  GI clinic.  Patient also takes creon.    Amitiza had worked for awhile then lost effectiveness. She was trialed on Trulance but reported frequent loose stools in the initial days (including nocturnal stooling and incontinence) which prompted her to stop. Trial of rifaximin in the past with with improvement in bloating and stool consistency though, as stated above, a recent re-trial for her pain, n/v, and fullness was not successful. She also tried a bowel cleanout which resulted in  "two-days of emptying, but no change in her pain, fullness, or n/v symptoms.   Motegrity was then started with some success, though noted a HA. Today, she continues on Motegrity, with 1000 mg Mg citrate tabs, 1 senna, and 1 docusate daily. With this regimen she moves her bowels once every 2-3 days. Stools are \"soft and snakelike\" and easy to pass. She has Fleets enemas on-hand to use if no BM for 3 days, but states she hasn't had to use this as of yet. She denies steatorrhea and feels that Creon dosing is working well. She continues to have a HA which is present every day, though it may leave for a short-time. She hasn't taken much to address the symptom as she only recently heard it was ok to take acetaminophen (from her hepatology visit).         GERD, Dysphagia   Summarized/taken from prior documentation - not discussed today  Patient experiences GERD as substernal and throat burning with nocturnal relaxant causing choking. She had previously followed with Dr. George Flores in out clinic and reports a prior Schatzki's ring requiring previous dilation. Manometry was noral, and pH impedence had a DeMeester of 24 with positive symptoms association. Patient has treated GERD symptoms with BID PPI, Carafate, H2 blocker, and tums. At her last visit, she had been experiencing heartburn and has restarted omeprazole 40 mg daily with continued symptoms with specific food trigger.  Had also previously been on Zantac up to 300 mg a day.        PROBLEM LIST        Patient Active Problem List     Diagnosis Date Noted     Iron deficiency anemia 03/22/2019       Priority: Medium     Headache, chronic migraine without aura 09/18/2018       Priority: Medium     Chronic pain syndrome 09/18/2018       Priority: Medium     S/P hernia repair 08/23/2018       Priority: Medium     Incisional hernia, without obstruction or gangrene 05/20/2018       Priority: Medium     Adhesive capsulitis of shoulder, unspecified laterality 11/14/2017       " Priority: Medium     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 06/26/2017       Priority: Medium     Gastroparesis         Priority: Medium     ACP (advance care planning) 03/28/2017       Priority: Medium       Advance Care Planning 3/28/2017: Receipt of ACP document:  Received: Health Care Directive which was witnessed or notarized on 12/8/16.  Document previously scanned on 1/12/17.  Validation form completed and scanned.  Code Status reflects choices in most recent ACP document..  Confirmed/documented designated decision maker(s).  Added by May Baires   Advance Care Planning Liaison                    Pancreatic insufficiency 01/17/2017       Priority: Medium     Post-pancreatectomy diabetes (H) 12/28/2016       Priority: Medium     Abdominal pain 06/02/2015       Priority: Medium     S/P ERCP 06/02/2015       Priority: Medium     History of ERCP 04/20/2015       Priority: Medium     Other type of intractable migraine         Priority: Medium       Diagnosis updated by automated process. Provider to review and confirm.        GERD (gastroesophageal reflux disease) 12/01/2010       Priority: Medium     Lumbago 04/18/2005       Priority: Medium       History of L5-S1 degenerative disk disease.           Sensorineural hearing loss 01/10/2005       Priority: Medium       Problem list name updated by automated process. Provider to review        Vertiginous syndrome and labyrinthine disorder 01/10/2005       Priority: Medium       Problem list name updated by automated process. Provider to review  IMO Regulatory Load OCT 2020        Sprain and strain of other specified sites of hip and thigh 12/09/2002       Priority: Medium     Chondromalacia of patella 12/09/2002       Priority: Medium     Need for prophylactic immunotherapy         Priority: Medium       trees, grass, srw, dust mites, cat        Allergic rhinitis due to other allergen 12/21/2001       Priority: Medium     Hypothyroidism         Priority: Medium      "  Problem list name updated by automated process. Provider to review            PERTINENT MEDICATIONS:      Current Outpatient Medications   Medication     acarbose (PRECOSE) 50 MG tablet     amitriptyline (ELAVIL) 10 MG tablet     amylase-lipase-protease (CREON 24) 96717-23002 units CPEP per EC capsule     azelastine (ASTELIN) 0.1 % nasal spray     blood glucose (PAT CONTOUR NEXT) test strip     blood glucose monitoring (PAT MICROLET) lancets     cetirizine (ZYRTEC) 10 MG tablet     Continuous Blood Gluc Sensor (FREESTYLE LISA 2 SENSOR) MISC     diphenhydrAMINE (BENADRYL ALLERGY) 25 MG capsule     estradiol (VAGIFEM) 10 MCG TABS vaginal tablet     famotidine (PEPCID) 20 MG tablet     FOLIC ACID PO     glucose 40 % GEL gel     ibuprofen (ADVIL/MOTRIN) 400 MG tablet     levothyroxine (SYNTHROID) 137 MCG tablet     montelukast (SINGULAIR) 10 MG tablet     multivitamin CF formula (CHOICEFUL) capsule     omeprazole (PRILOSEC) 40 MG DR capsule     order for DME     polyethylene glycol (MIRALAX) 17 GM/Dose powder     prochlorperazine (COMPAZINE) 5 MG tablet     promethazine (PHENERGAN) 25 MG tablet     Prucalopride Succinate (MOTEGRITY) 2 MG TABS     scopolamine (TRANSDERM) 1 MG/3DAYS 72 hr patch     senna-docusate (SENOKOT-S;PERICOLACE) 8.6-50 MG per tablet     Sharps Container (BD SHARPS ) MISC     ZOLMitriptan (ZOMIG) 5 MG tablet      No current facility-administered medications for this visit.            PHYSICAL EXAMINATION:  Vitals /75 (BP Location: Left arm, Patient Position: Sitting, Cuff Size: Adult Regular)   Pulse 103   Ht 1.721 m (5' 7.75\")   Wt 65 kg (143 lb 4.8 oz)   SpO2 98%   BMI 21.95 kg/m     Wt       Wt Readings from Last 2 Encounters:   08/04/21 65 kg (143 lb 4.8 oz)   07/14/21 65.3 kg (144 lb)      Gen: Pt sitting up in NAD, interactive and cooperative on exam  Eyes: sclerae anicteric, no injection  ENT:  OP clear, MMM  Skin: Warm, dry, no jaundice, nails appear " healthy  Neuro: alert, oriented, answers questions appropriately        PERTINENT STUDIES:             Lab on 07/20/2021   Component Date Value Ref Range Status     WBC Count 07/20/2021 4.5  4.0 - 11.0 10e3/uL Final     RBC Count 07/20/2021 4.28  3.80 - 5.20 10e6/uL Final     Hemoglobin 07/20/2021 13.4  11.7 - 15.7 g/dL Final     Hematocrit 07/20/2021 40.4  35.0 - 47.0 % Final     MCV 07/20/2021 94  78 - 100 fL Final     MCH 07/20/2021 31.3  26.5 - 33.0 pg Final     MCHC 07/20/2021 33.2  31.5 - 36.5 g/dL Final     RDW 07/20/2021 13.2  10.0 - 15.0 % Final     Platelet Count 07/20/2021 348  150 - 450 10e3/uL Final     Sodium 07/20/2021 136  133 - 144 mmol/L Final     Potassium 07/20/2021 4.0  3.4 - 5.3 mmol/L Final     Chloride 07/20/2021 102  94 - 109 mmol/L Final     Carbon Dioxide (CO2) 07/20/2021 32  20 - 32 mmol/L Final     Anion Gap 07/20/2021 2* 3 - 14 mmol/L Final     Urea Nitrogen 07/20/2021 12  7 - 30 mg/dL Final     Creatinine 07/20/2021 0.68  0.52 - 1.04 mg/dL Final     Calcium 07/20/2021 8.5  8.5 - 10.1 mg/dL Final     Glucose 07/20/2021 105* 70 - 99 mg/dL Final     GFR Estimate 07/20/2021 >90  >60 mL/min/1.73m2 Final     INR 07/20/2021 1.13  0.85 - 1.15 Final     Bilirubin Total 07/20/2021 0.6  0.2 - 1.3 mg/dL Final     Bilirubin Direct 07/20/2021 0.2  0.0 - 0.2 mg/dL Final     Protein Total 07/20/2021 7.0  6.8 - 8.8 g/dL Final     Albumin 07/20/2021 3.9  3.4 - 5.0 g/dL Final     Alkaline Phosphatase 07/20/2021 115  40 - 150 U/L Final     AST 07/20/2021 125* 0 - 45 U/L Final     ALT 07/20/2021 206* 0 - 50 U/L Final     Immunoglobulin G 07/20/2021 886  610-1,616 mg/dL Final              Office Visit on 8/4/2021     Office Visit on 8/4/2021        Detailed Report      Note viewed by patient      Again, thank you for allowing me to participate in the care of your patient.      Sincerely,    Mandeep Park MD

## 2021-08-09 NOTE — PROGRESS NOTES
Dinora is a 56 year old who is being evaluated via a billable video visit.      How would you like to obtain your AVS? MyChart  If the video visit is dropped, the invitation should be resent by:  Will anyone else be joining your video visit?       Video Start Time: 3:30 PM  Video-Visit Details    Type of service:  Video Visit    Video End Time:4:35 PM    Originating Location (pt. Location): Home    Distant Location (provider location):  [facility] [place]     Platform used for Video Visit: Emeli Farias is a 56-year-old very well-known to me from years past who is 5 years post TPI AT and referred back to see us by Dr. Genao for intractable abdominal pain, bloating and weight loss.  We spent 1 hour and more on a video call reviewing her entire history from the start and leading up to TPI AT.  We reviewed that she actually did quite well for several years but that over the last year has developed intractable bloating food intolerance, weight loss dehydration and abdominal pain.  Dr. NALDO damon is copied and pasted below which is excellent summary of her post TPI AT issues.  Her quality of life is severely impaired by the symptoms and she feels weak to the point that she can barely walk distances whereas she used to work out at the [facility] on a daily basis up till a couple of years ago.  She is also barely maintaining her weight which is down to 140 pounds with a BMI of 21.95.  Eating just about anything even boost shakes provokes bloating and fullness and discomfort and solid foods are pretty much out of the question.  She has tried multiple dietary manipulations.  As mentioned she has a past history of diagnosed gastroparesis which I made in 2016 with a T1 half 268 minutes.  She still manages to be off insulin with well-controlled blood sugars and hypoglycemia is controlled with acarbose.    Also has elevated LFTs of unclear cause. Following w hepatology. 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 Serum IgG4 levels followed regularly, but marginally elevated above ULN only. MRCP arranged.    We spent over 1 hour in video consultation discussing that it seems most likely that her symptoms are due to severe gastroparesis.  She cannot tolerate metoclopramide similar to the majority of patients.  She has not been tried on domperidone.  She is losing weight and debilitated.  She and I agreed she would benefit from IV outpatient infusion protocol of lactated Ringer's but without Zofran as she does not tolerate that or is allergic to it and this can be done at [facility] which is very close to her home and does not need to be done at a [place] facility.  We also discussed that domperidone can be obtained through an I&D and I think that would be worth exploring but that ultimately she may benefit from a G BALDOMERO M.  Nobody is doing that at our facility and the place with the most experience is [facility].    First step will be to repeat a gastric emptying study and restart IV infusion therapy [facility] ASAP.  This patient scheduled to leave for a week long driving trip to [place] to visit the rest of her family including all 3 children.  I do suggest that she have the local hospital in [facility] contact us if she needs IV infusion out there.  I cautioned her not to over exert herself and make sure she avoids dehydration.    Finally we both agreed that she should have a GJ tube replaced.  She had the initial G-tube placed by interventional radiology at [facility] and it was far in the left upper quadrant which is the least optimal site and resulted in at least 4 episodes of GJ tube flipping back.  Unfortunately she had a very traumatic experience with our IR docs taking over an hour to reposition the GJ tube without any IV sedation.  This prompted her to require health psychology counseling for post traumatic traumatic stress.  I reassured her that if we do a PEG J we will do it under  general anesthesia and I will do it to position that as centrally or is close to the pylorus as possible which will probably be some distance from the previous site which was very irritating near the costal margin.  That will have less chance of the tube dislodging from the jejunum back into the stomach.  I also reassured her that when we exchanged GJ tubes we do them with deep sedation either Versed fentanyl in the endoscopy unit, MAC but that it should provide amnesia so that she should not have to fear GJ tube drainage.  Ultimately I think she may benefit from a  problem or domperidone or both and I will see what I can do to arrange that outside our facility perhaps at [facility]    If her gastric emptying study is normal route were going to have greater difficulty finding a long-term solution but we will work with her and I think she felt reassured that we have a treatment plan for her and can at least improve her quality of life which is now quite miserable      1) IV infusion protocol, at [facility]  2) Gastric emptying study  3) Schedule PEGJ w Dr Park in next 2 weeks if possible  4) Follow-up w Dr Park 2 weeks after GJ, research GPOEM and domperidone options if GES shows prolonged emptying    Mandeep Park MD  Gastroenterology, Pancreas and Biliary Disorders  [facility]       GI CLINIC VISIT     CC:  Follow-up        ASSESSMENT/PLAN:  (R10.84) Abdominal pain, generalized  (primary encounter diagnosis)  (R63.4) Weight loss  (K31.84) Gastroparesis  (K59.00) Constipation, unspecified constipation type  (Z94.83) Status post pancreas transplantation (H)     Pain with eating solids, improved somewhat with prior dietary changes/transition to liquid-only diet, but now having pain, fullness, and nausea even with only liquid diet. Having infrequent vomiting with round-the-clock scopolamine.      Suspect this is 2/2 gastroparesis, but will investigate as below. Note no prior evidence of  obstruction, normal CTE and normal SBFT. Even with bowel cleanout no improvement in symptoms.Has not had recent EGD, but PUD is lower on differential (potentially could pursue endoscopic exam at time of J-tube placement, see below).      For GP: No prior response to metoclopramide. Prior anaphylaxis to azithromycin (2008 during an ED visit) so not able to use this or erythromycin as a prokinetic. We discussed bypassing stomach and feeding small bowel if able, and she is more open to this at this time. Would prefer a direct-jejunostomy (many prior issues with G-J with J-tube coiling and related PTSD, even with repositioning would have some concern this could happen in setting of repeated retching/vomiting). Currently maintaining weight and hydration for the last month, but working hard to do so and still quite symptomatic (did lose 16 lbs prior to that). Buspar for gastric accomodation a possibility, but data limited in GP and patient symptoms warrant nutritional support.     Will send for CTA to evaluate for mesenteric ischemia in post-surgical abdomen.   Will also ask that she be seen in Pancreas Clinic as well to ensure no other post-TPIAT complications they'd like to evaluate for and for input on potential jejunostomy (? Preference from her team for endoscopically vs surgically placed).     For constipation, BMs are in a reasonable place - going about 2-3x/week with soft consistency. Offerred switch off of Motegrity due to report of HA, pt declined at this time since BMs are doing ok. If this needs to be stopped, we discussed a possible re-trial of Trulance --> would do a bowel cleanout first, then start Trulance and may be able to avoid marked bowel emptying with start of med (potentially avoid nighttime incontinence which occurred during first couple days of starting Trulance in the past).         - continue with liquid only diet as you are  - consider a trial of Amy Robertson in addition to the  shakes you're already using  - continue with low fiber and low fat loads to stomach  - schedule a CT angio at your earliest convenience  - will check in with your pancreas team to see if there are any post-surgical contributors they'd like to assess for  - would like to start small bowel feedings ---> will check in with your pancreas team  - acetaminophen ok for headaches  - continue scopolamine, ok for prn compazine for breakthrough nausea  - continue your efforts to hydrate  - IVF infusions if unable to keep up on liquids  - continue current bowel regimen  - follow-up with hepatology and rheumatology as planned  - follow-up in GI clinic as scheduled with Dr. Genao           It was a pleasure to participate in the care of this patient; please contact us with any further questions.  A total of 45 face-to-face minutes was spent with this patient, >50% of which was counseling regarding the above delineated issues. An additional 40 minutes was spent on the date of the encounter doing chart review, documentation, care coordination, and further activities as noted above.     Vijaya Genao MD   of Medicine  Division of Gastroenterology, Hepatology and Nutrition  [facility]     ---------------------------------------------------------------------------------------------------  HPI:     Ms. 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 She was initially seen in general GI clinic by this writer on 11/1/2017 and has been seen by myself and ANA Gan since that time. Her pancreas txplt surgeon was Dr. Phoenix; she now follows  with Dr. Honeycutt. She previously followed with Dr. Pakr of GI in Pancreas-Biliary Clinic.     History summarized and updated from previous documentation from ANA Gan and myself. Please see previous notes for further details      Gastroparesis  Gastric emptying study with 2-hour study at [facility] have been consistent with gastroparesis, patient had follow-up study here 6/27/2016 with 49% remaining at 4 hours (?on pain meds at the time).  Had GJ tube in fall 2016, NG tube used for venting with tube feeds and was able to wean off of tube feeds after TPIAT.  Patient has used several medication for the symptoms including scopolamine patch, prochlorperazine, promethazine which helped symptoms.  She has also been seen by neurology and psychology due to concern that she has a difficult time sleeping and with migraines.  At previous visits, reported nausea with occasional episodes of vomiting.  Her regimen was previously well managed with Compazine in the morning, Phenergan at night.  She did note that nausea and vomiting worsened with migraines.     Patient reports that she had been feeling pretty well up until early November.  Early November the patient noted worsening symptoms.  He was seen at the [facility] emergency department on November 18.  At that time, blood work notable for normal BMP, low lipase, unremarkable hepatic panel.  CBC without elevation of WBCs though she did have elevated platelets, neutrophils and lymphocytes.  CT at that time with moderate stool burden, nonspecific fluid in small intestine.  No evidence of obstruction per documentation. Had similar episodes after this. This had improved with juicing foods/liquid-only diet. Was tolerating some solids aiming for 6-7 small meals a day with about a cup of food at each meal.  States that foods such as toast, bread, gluten seem to go better than healthier foods.  Initially these dietary changes seemed to help, but over time the  response has waned. From winter to May/June she lost about 16 lbs (from 156-->140 lbs). She continued to experience more pronounced post-prandial pain and fullness and had vomiting of food after eating.      Today, Ms. Loo believes weight has been stable around 140 lbs for a month or so. Her current diet is mostly:  Drinks 70 fl oz of water/24 hours  Also 1 V8 drink, 1 smoothie, 2 Ensure shakes --> thinks about 1200 kevin/day  Occasionally with try a soft solid like rice cereal or yogurt, but lately this has consistently led to abdominal pain. Liquids also lead to pain and abdominal fullness, but she drinks them slowly and tries to push through the discomfort. Nausea is constant, but helped a good deal by scopolamine patch. She wonders what she can use for breakthrough nausea. Vomiting is much less frequent with scopolamine (last episode over a week ago). Urine is clear to light yellow so she feels she's hydrated.      Beyond dietary interventions for GP, she has tried metoclopramide with no response. Has not tried erythromycin or azithromycin due to anaphylactic event with prior azithromycin use (in ED in 2008).  Symptoms did not improve with bowel cleanout. Another course of rifaximin (previously successful at addressing bloating and stool issues) was not helpful for her pain, bloating/fullness, and n/v. Amitriptyline was increased to 60 mg nightly with no change in discomfort.      Constipation/irregular bowel movements/bloating  Patient reports history of ongoing constipation for over 20 years after having her first child.  She has tried multiple interventions including a bowel cleanout, MiraLAX, senna, milk of magnesium, Linzess, and Amitiza for which she was on when she first came to  GI clinic.  Patient also takes creon.    Amitiza had worked for awhile then lost effectiveness. She was trialed on Trulance but reported frequent loose stools in the initial days (including nocturnal stooling and incontinence)  "which prompted her to stop. Trial of rifaximin in the past with with improvement in bloating and stool consistency though, as stated above, a recent re-trial for her pain, n/v, and fullness was not successful. She also tried a bowel cleanout which resulted in two-days of emptying, but no change in her pain, fullness, or n/v symptoms.   Motegrity was then started with some success, though noted a HA. Today, she continues on Motegrity, with 1000 mg Mg citrate tabs, 1 senna, and 1 docusate daily. With this regimen she moves her bowels once every 2-3 days. Stools are \"soft and snakelike\" and easy to pass. She has Fleets enemas on-hand to use if no BM for 3 days, but states she hasn't had to use this as of yet. She denies steatorrhea and feels that Creon dosing is working well. She continues to have a HA which is present every day, though it may leave for a short-time. She hasn't taken much to address the symptom as she only recently heard it was ok to take acetaminophen (from her hepatology visit).         GERD, Dysphagia   Summarized/taken from prior documentation - not discussed today  Patient experiences GERD as substernal and throat burning with nocturnal relaxant causing choking. She had previously followed with Dr. George Flores in out clinic and reports a prior Schatzki's ring requiring previous dilation. Manometry was noral, and pH impedence had a DeMeester of 24 with positive symptoms association. Patient has treated GERD symptoms with BID PPI, Carafate, H2 blocker, and tums. At her last visit, she had been experiencing heartburn and has restarted omeprazole 40 mg daily with continued symptoms with specific food trigger.  Had also previously been on Zantac up to 300 mg a day.        PROBLEM LIST        Patient Active Problem List     Diagnosis Date Noted     Iron deficiency anemia 03/22/2019       Priority: Medium     Headache, chronic migraine without aura 09/18/2018       Priority: Medium     Chronic pain " syndrome 09/18/2018       Priority: Medium     S/P hernia repair 08/23/2018       Priority: Medium     Incisional hernia, without obstruction or gangrene 05/20/2018       Priority: Medium     Adhesive capsulitis of shoulder, unspecified laterality 11/14/2017       Priority: Medium     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 06/26/2017       Priority: Medium     Gastroparesis         Priority: Medium     ACP (advance care planning) 03/28/2017       Priority: Medium       Advance Care Planning 3/28/2017: Receipt of ACP document:  Received: Health Care Directive which was witnessed or notarized on 12/8/16.  Document previously scanned on 1/12/17.  Validation form completed and scanned.  Code Status reflects choices in most recent ACP document..  Confirmed/documented designated decision maker(s).  Added by May Baires   Advance Care Planning Liaison                    Pancreatic insufficiency 01/17/2017       Priority: Medium     Post-pancreatectomy diabetes (H) 12/28/2016       Priority: Medium     Abdominal pain 06/02/2015       Priority: Medium     S/P ERCP 06/02/2015       Priority: Medium     History of ERCP 04/20/2015       Priority: Medium     Other type of intractable migraine         Priority: Medium       Diagnosis updated by automated process. Provider to review and confirm.        GERD (gastroesophageal reflux disease) 12/01/2010       Priority: Medium     Lumbago 04/18/2005       Priority: Medium       History of L5-S1 degenerative disk disease.           Sensorineural hearing loss 01/10/2005       Priority: Medium       Problem list name updated by automated process. Provider to review        Vertiginous syndrome and labyrinthine disorder 01/10/2005       Priority: Medium       Problem list name updated by automated process. Provider to review  IMO Regulatory Load OCT 2020        Sprain and strain of other specified sites of hip and thigh 12/09/2002       Priority: Medium     Chondromalacia of patella  "12/09/2002       Priority: Medium     Need for prophylactic immunotherapy         Priority: Medium       trees, grass, srw, dust mites, cat        Allergic rhinitis due to other allergen 12/21/2001       Priority: Medium     Hypothyroidism         Priority: Medium       Problem list name updated by automated process. Provider to review            PERTINENT MEDICATIONS:      Current Outpatient Medications   Medication     acarbose (PRECOSE) 50 MG tablet     amitriptyline (ELAVIL) 10 MG tablet     amylase-lipase-protease (CREON 24) 44341-55760 units CPEP per EC capsule     azelastine (ASTELIN) 0.1 % nasal spray     blood glucose (PAT CONTOUR NEXT) test strip     blood glucose monitoring (PAT MICROLET) lancets     cetirizine (ZYRTEC) 10 MG tablet     Continuous Blood Gluc Sensor (FREESTYLE LISA 2 SENSOR) MISC     diphenhydrAMINE (BENADRYL ALLERGY) 25 MG capsule     estradiol (VAGIFEM) 10 MCG TABS vaginal tablet     famotidine (PEPCID) 20 MG tablet     FOLIC ACID PO     glucose 40 % GEL gel     ibuprofen (ADVIL/MOTRIN) 400 MG tablet     levothyroxine (SYNTHROID) 137 MCG tablet     montelukast (SINGULAIR) 10 MG tablet     multivitamin CF formula (CHOICEFUL) capsule     omeprazole (PRILOSEC) 40 MG DR capsule     order for DME     polyethylene glycol (MIRALAX) 17 GM/Dose powder     prochlorperazine (COMPAZINE) 5 MG tablet     promethazine (PHENERGAN) 25 MG tablet     Prucalopride Succinate (MOTEGRITY) 2 MG TABS     scopolamine (TRANSDERM) 1 MG/3DAYS 72 hr patch     senna-docusate (SENOKOT-S;PERICOLACE) 8.6-50 MG per tablet     Sharps Container (BD SHARPS ) MISC     ZOLMitriptan (ZOMIG) 5 MG tablet      No current facility-administered medications for this visit.            PHYSICAL EXAMINATION:  Vitals /75 (BP Location: Left arm, Patient Position: Sitting, Cuff Size: Adult Regular)   Pulse 103   Ht 1.721 m (5' 7.75\")   Wt 65 kg (143 lb 4.8 oz)   SpO2 98%   BMI 21.95 kg/m     Wt       Wt Readings " from Last 2 Encounters:   08/04/21 65 kg (143 lb 4.8 oz)   07/14/21 65.3 kg (144 lb)      Gen: Pt sitting up in NAD, interactive and cooperative on exam  Eyes: sclerae anicteric, no injection  ENT:  OP clear, MMM  Skin: Warm, dry, no jaundice, nails appear healthy  Neuro: alert, oriented, answers questions appropriately        PERTINENT STUDIES:             Lab on 07/20/2021   Component Date Value Ref Range Status     WBC Count 07/20/2021 4.5  4.0 - 11.0 10e3/uL Final     RBC Count 07/20/2021 4.28  3.80 - 5.20 10e6/uL Final     Hemoglobin 07/20/2021 13.4  11.7 - 15.7 g/dL Final     Hematocrit 07/20/2021 40.4  35.0 - 47.0 % Final     MCV 07/20/2021 94  78 - 100 fL Final     MCH 07/20/2021 31.3  26.5 - 33.0 pg Final     MCHC 07/20/2021 33.2  31.5 - 36.5 g/dL Final     RDW 07/20/2021 13.2  10.0 - 15.0 % Final     Platelet Count 07/20/2021 348  150 - 450 10e3/uL Final     Sodium 07/20/2021 136  133 - 144 mmol/L Final     Potassium 07/20/2021 4.0  3.4 - 5.3 mmol/L Final     Chloride 07/20/2021 102  94 - 109 mmol/L Final     Carbon Dioxide (CO2) 07/20/2021 32  20 - 32 mmol/L Final     Anion Gap 07/20/2021 2* 3 - 14 mmol/L Final     Urea Nitrogen 07/20/2021 12  7 - 30 mg/dL Final     Creatinine 07/20/2021 0.68  0.52 - 1.04 mg/dL Final     Calcium 07/20/2021 8.5  8.5 - 10.1 mg/dL Final     Glucose 07/20/2021 105* 70 - 99 mg/dL Final     GFR Estimate 07/20/2021 >90  >60 mL/min/1.73m2 Final     INR 07/20/2021 1.13  0.85 - 1.15 Final     Bilirubin Total 07/20/2021 0.6  0.2 - 1.3 mg/dL Final     Bilirubin Direct 07/20/2021 0.2  0.0 - 0.2 mg/dL Final     Protein Total 07/20/2021 7.0  6.8 - 8.8 g/dL Final     Albumin 07/20/2021 3.9  3.4 - 5.0 g/dL Final     Alkaline Phosphatase 07/20/2021 115  40 - 150 U/L Final     AST 07/20/2021 125* 0 - 45 U/L Final     ALT 07/20/2021 206* 0 - 50 U/L Final     Immunoglobulin G 07/20/2021 886  610-1,616 mg/dL Final              Office Visit on 8/4/2021     Office Visit on  8/4/2021        Detailed Report      Note viewed by patient

## 2021-08-10 ENCOUNTER — TELEPHONE (OUTPATIENT)
Dept: GASTROENTEROLOGY | Facility: CLINIC | Age: 56
End: 2021-08-10

## 2021-08-10 ENCOUNTER — MYC MEDICAL ADVICE (OUTPATIENT)
Dept: GASTROENTEROLOGY | Facility: CLINIC | Age: 56
End: 2021-08-10

## 2021-08-10 ENCOUNTER — MYC MEDICAL ADVICE (OUTPATIENT)
Dept: INTERNAL MEDICINE | Facility: CLINIC | Age: 56
End: 2021-08-10

## 2021-08-10 RX ORDER — NALOXONE HYDROCHLORIDE 0.4 MG/ML
0.2 INJECTION, SOLUTION INTRAMUSCULAR; INTRAVENOUS; SUBCUTANEOUS
Status: CANCELLED | OUTPATIENT
Start: 2021-08-10

## 2021-08-10 RX ORDER — HEPARIN SODIUM,PORCINE 10 UNIT/ML
5 VIAL (ML) INTRAVENOUS
Status: CANCELLED | OUTPATIENT
Start: 2021-08-10

## 2021-08-10 RX ORDER — ALBUTEROL SULFATE 90 UG/1
1-2 AEROSOL, METERED RESPIRATORY (INHALATION)
Status: CANCELLED
Start: 2021-08-10

## 2021-08-10 RX ORDER — DIPHENHYDRAMINE HYDROCHLORIDE 50 MG/ML
50 INJECTION INTRAMUSCULAR; INTRAVENOUS
Status: CANCELLED
Start: 2021-08-10

## 2021-08-10 RX ORDER — HEPARIN SODIUM (PORCINE) LOCK FLUSH IV SOLN 100 UNIT/ML 100 UNIT/ML
5 SOLUTION INTRAVENOUS
Status: CANCELLED | OUTPATIENT
Start: 2021-08-10

## 2021-08-10 RX ORDER — ALBUTEROL SULFATE 0.83 MG/ML
2.5 SOLUTION RESPIRATORY (INHALATION)
Status: CANCELLED | OUTPATIENT
Start: 2021-08-10

## 2021-08-10 RX ORDER — EPINEPHRINE 1 MG/ML
0.3 INJECTION, SOLUTION, CONCENTRATE INTRAVENOUS EVERY 5 MIN PRN
Status: CANCELLED | OUTPATIENT
Start: 2021-08-10

## 2021-08-10 NOTE — TELEPHONE ENCOUNTER
Called pt r/t GJ placement with Dr. Park    Advanced Endoscopy Tube Feed Placement Protocol    Patients recommended/referred to feeding tube placement must satisfy the following requirements prior to OR scheduling:    PCP to manage tube/feed/labs:TBD    Dietitian for TF Recommendations:pt prefers Amy Cintron, visit with Quyen scheduled    Home Infusion agengy: [facility], has used before - [phone], left message w/ referral and placed online referral 8/11    PLC: has had tubes prior, does not need    Pt requesting we fax IVF protocol here to use while on vacation next week  [facility]  [phone]- fax  [phone]    CTA imaging scheduled 8/24, can schedule peg    Please assist in scheduling:     Procedure/Imaging/Clinic: GJ placement  Physician: Dr. Park  Timing: asap  Procedure length:60 min  Anesthesia:general  Dx: FTT  Tier:2  Location: [facility]       Comments:

## 2021-08-10 NOTE — PATIENT INSTRUCTIONS
Follow up:    Dr. Park has outlined the following steps after your recent clinic visit:    1) Start IV infusion protocol -can be done at [facility]  2) Schedule appointment to repeat Gastric emptying study  3) Schedule PEGJ w Dr Park in next 2 weeks if possible  4) Follow-up w Dr Park 2 weeks after GJ replacement       Please call with any questions or concerns regarding your clinic visit today.    It is a pleasure being involved in your health care.    Contacts post-consultation depending on your need:    Schedule Clinic Appointments            [phone] # 1   M-F 7:30 - 5 pm    Kenya Victor RN Care Coordinator  [phone]    Mansoor Dhillon LPN    [phone]     OR Procedure Scheduling                             [phone]    My Chart is available 24 hours a day and is a secure way to access your records and communicate with your care team.  I strongly recommend signing up if you haven't already done so, if you are comfortable with computers.  If you would like to inquire about this or are having problems with My Chart access, you may call [phone] or go online at [email or web address].  Please allow at least 24 hours for a response and extra time on weekends and Holidays.

## 2021-08-10 NOTE — TELEPHONE ENCOUNTER
Called Pt to follow up on clinic visit from yesterday with Dr. Park. Pt will call Radiology to schedule GES. Pt will call back/send [email or web address] message to let us know where she would like IV infusion protocol faxed to. Discussed with Pt that Kenya, RNCC, will contact her RE GJ tube placement.    Mansoor Dhillon LPN

## 2021-08-11 ENCOUNTER — PREP FOR PROCEDURE (OUTPATIENT)
Dept: GASTROENTEROLOGY | Facility: CLINIC | Age: 56
End: 2021-08-11

## 2021-08-11 DIAGNOSIS — R62.7 ADULT FAILURE TO THRIVE: Primary | ICD-10-CM

## 2021-08-11 NOTE — TELEPHONE ENCOUNTER
IV fluid infusion protocol faxed to [facility], attn: Infusion Nurses, at [phone]. Phone: [phone].    Also per Pt's request, protocol was faxed to [facility] in white [facility] at [phone]. Phone: [phone].    Mansoor Dhillon LPN

## 2021-08-16 ENCOUNTER — TELEPHONE (OUTPATIENT)
Dept: GASTROENTEROLOGY | Facility: CLINIC | Age: 56
End: 2021-08-16

## 2021-08-16 PROBLEM — R62.7 ADULT FAILURE TO THRIVE: Status: ACTIVE | Noted: 2021-08-16

## 2021-08-16 NOTE — TELEPHONE ENCOUNTER
FUTURE VISIT INFORMATION      SURGERY INFORMATION:    Date: 21    Location: [facility] OR    Surgeon:  Mandeep Park MD    Anesthesia Type:  general    Procedure: REPLACEMENT, GASTROJEJUNOSTOMY TUBE, PERCUTANEOUS    Consult: virtual visit 21    RECORDS REQUESTED FROM:       Primary Care Provider: Latasha Paul APRN [facility]    Most recent EKG+ Tracin21

## 2021-08-16 NOTE — TELEPHONE ENCOUNTER
Spoke to patient in regards to scheduled procedure. Informed patient she is scheduled with Dr. Park on 9/7/2021. Informed patient she will need an updated pre-op physical within 30 days of her procedure and a COVID-19 test within 96-72 hours of procedure date. Patient stated she would like to have virtual PAC visit on 8/31/2021. Patient stated she is going to have this done covid test done locally. Informed patient she will need a  and someone to monitor her for 24 hours after the procedure. Informed patient all scheduling details will be sent to the address listed on Epic. Address confirmed on this call.

## 2021-08-17 DIAGNOSIS — R62.7 ADULT FAILURE TO THRIVE: Primary | ICD-10-CM

## 2021-08-17 DIAGNOSIS — Z11.59 ENCOUNTER FOR SCREENING FOR OTHER VIRAL DISEASES: ICD-10-CM

## 2021-08-22 ENCOUNTER — MYC MEDICAL ADVICE (OUTPATIENT)
Dept: INTERNAL MEDICINE | Facility: CLINIC | Age: 56
End: 2021-08-22

## 2021-08-22 NOTE — PROGRESS NOTES
"  Health Psychology                                                                                                                          Sweta Michelle, Ph.D., L.P [phone]  Melva Beebe, Ph.D., L.P. [phone]  Rachel Sloan, Ph.D. [phone]  Kenya Sanchez, Ph.D., L.P. [phone]  Estrada Patel, Ph.D., A.B.P.P., L.P. [phone]         Enedina Joaquin, Ph.D., L.P. [phone]                Critical access hospital and [facility], 3rd Floor  [address] [place] [address]     Health Psychology Follow-up Visit - Telehealth Visit     Dinora Mcghee is a 56 year old female who is being evaluated via a billable video visit.       The patient has been notified of following:      \"This video visit will be conducted via a video visit between you and your physician/provider. We have found that certain health care needs can be provided without the need for an in-person physical exam.  This service lets us provide the care you need with a video conversation.  If a prescription is necessary we can send it directly to your pharmacy.  If lab work is needed we can place an order for that and you can then stop by our lab to have the test done at a later time.     Video visits are billed at different rates depending on your insurance coverage.  Please reach out to your insurance provider with any questions.     If during the course of the call the physician/provider feels a video visit is not appropriate, you will not be charged for this service.\"     Patient has given verbal consent for Video visit? Yes     Will anyone else be joining your video visit? No    Date of Service:  7/22/21    SUBJECTIVE:  Dinora Mcghee was seen for individual psychotherapy. Reviewed emotional and physical health since our last session.    Symptoms reported: Slight improvement in symptoms of depression due to increased hopefulness and having a specific short-term plan; I.e. meeting with provider in " hepatology today. Ongoing GI distress although she was hopeful because she was able to eat macaroni and cheese.     Progress towards goals: Continuing to engage in adaptive cognitive and behavioral coping skills and practicing gratitude. Utilizing social supports and setting boundaries with conversation topics.  Recent visit with friend went well.     Interventions utilized: Reviewed feelings about upcoming appointment and how having a plan feels comforting and familiar.  While she does not have recommendations from this provider, she finds it helpful to have something specific to focus on and specific steps.  Reviewed progression of increased acceptance towards current health status.  Discussed this as a broader pattern, difficulty accommodating significant changes, appropriately, and eventually she can accept this and focus on problem solving.  Discussed factors which make acceptance more difficult currently, such as the fact she had been so stable with her health in past years and also that she is not able to connect with aspects of her identity that are important for her (intimacy in relationship, physical activity). Validated Ms. Mcghee's emotional experiences and encouraged ongoing coping skills and participation in healthcare visits.     Ms. Mcghee was engaged throughout the visit and expressed understanding of information provided.     OBJECTIVE:  Ms. Mcghee was available for scheduled visit.  She reported slight improvement in mood.  Affect was mood- and thought-congruent.  Tearful at times.  Insight and judgment good.  Thought processes logical and linear. Speech WNL.  Denied suicidal ideation.     ASSESSMENT:  Psychological Assessment:  The PHQ-9 is an instrument for screening, diagnosing, monitoring and measuring the severity of depression. Scores of 5, 10, 15, and 20 represent cutpoints for mild, moderate, moderately severe and severe depression, respectively.   PHQ-9 SCORE 7/1/2019 6/21/2021 7/8/2021    PHQ-9 Total Score [facility] 3 (Minimal depression) 13 (Moderate depression) 13 (Moderate depression)   PHQ-9 Total Score 3 13 13       The DARCY-7 is an instrument for screening, diagnosing, monitoring and measuring the severity of anxiety. Scores of 5, 10, and 15 represent cutpoints for mild, moderate, and severe anxiety, respectively.  DARCY-7 SCORE 1/11/2018 1/22/2018 6/21/2021   Total Score 2 (minimal anxiety) - 0 (minimal anxiety)   Total Score 2 2 0     Ongoing therapy is appropriate for Ms. Mcghee, she expresses benefit from meeting.     DIAGNOSIS:  Adjustment disorder with mixed anxiety and depressed mood.     PLAN:  Follow-up appointment in two weeks.      Type of service: Telemedicine Psychotherapy for coping with health conditions and symptoms of depression and anxiety  Time of service:   ? Date: 7/22/21  ? Time Service Began: 8:40  ? Time Service Ended: 9:20  Reason that telemedicine is appropriate: Public health regulations due to COVID-19 pandemic/state of emergency  Mode of transmission: Secure real time interactive audio and visual telecommunication system via [email or web address]  Location of originating and distant sites:  ? Originating site (patient location): Patient's home  ? Distant site (provider site): Provider's home    Rachel Sloan, PhD,   Health Psychology Fellow    Tx plan completed: 7/8/21  Tx plan due:  7/8/22      I did not see this patient directly. I have read and agree with the contents of this note. Kenya Sanchez, PhD, , September 1, 2021

## 2021-08-23 ENCOUNTER — VIRTUAL VISIT (OUTPATIENT)
Dept: GASTROENTEROLOGY | Facility: CLINIC | Age: 56
End: 2021-08-23
Payer: COMMERCIAL

## 2021-08-23 ENCOUNTER — PATIENT OUTREACH (OUTPATIENT)
Dept: GASTROENTEROLOGY | Facility: CLINIC | Age: 56
End: 2021-08-23

## 2021-08-23 DIAGNOSIS — Z46.59 ENCOUNTER FOR CARE RELATED TO FEEDING TUBE: Primary | ICD-10-CM

## 2021-08-23 DIAGNOSIS — Z90.410 POST-PANCREATECTOMY DIABETES (H): ICD-10-CM

## 2021-08-23 DIAGNOSIS — R62.7 ADULT FAILURE TO THRIVE: Primary | ICD-10-CM

## 2021-08-23 DIAGNOSIS — K86.89 PANCREATIC INSUFFICIENCY: ICD-10-CM

## 2021-08-23 DIAGNOSIS — E13.9 POST-PANCREATECTOMY DIABETES (H): ICD-10-CM

## 2021-08-23 DIAGNOSIS — R10.84 ABDOMINAL PAIN, GENERALIZED: ICD-10-CM

## 2021-08-23 DIAGNOSIS — E89.1 POST-PANCREATECTOMY DIABETES (H): ICD-10-CM

## 2021-08-23 DIAGNOSIS — R63.4 WEIGHT LOSS: ICD-10-CM

## 2021-08-23 DIAGNOSIS — K31.84 GASTROPARESIS: ICD-10-CM

## 2021-08-23 PROCEDURE — 97803 MED NUTRITION INDIV SUBSEQ: CPT | Mod: 95 | Performed by: DIETITIAN, REGISTERED

## 2021-08-23 NOTE — TELEPHONE ENCOUNTER
Called patient to f/ up after visit with Dietitian to assess needs for teaching prior to tube placement.    Left message.    ML

## 2021-08-23 NOTE — PROGRESS NOTES
"Dinora Mcghee 56 year old female who is being evaluated via a billable video visit.      The patient has been notified of following:     \"This video visit will be conducted via a call between you and your physician/provider. We have found that certain health care needs can be provided without the need for an in-person physical exam.  This service lets us provide the care you need with a video conversation.  If a prescription is necessary we can send it directly to your pharmacy.  If lab work is needed we can place an order for that and you can then stop by our lab to have the test done at a later time.    Video visits are billed at different rates depending on your insurance coverage.  Please reach out to your insurance provider with any questions.    If during the course of the call the physician/provider feels a video visit is not appropriate, you will not be charged for this service.\"    Patient has given verbal consent for Video visit? Yes    How would you like to obtain your AVS? MyChart  If you are dropped from the video visit, the video invite should be resent to: Other e-mail: my chart   Will anyone else be joining your video visit? No      Video-Visit Details    Type of service:  Video Visit    Video Start Time: 9:33 AM   Video End Time: 10:10 AM    Originating Location (pt. Location): Home    Distant Location (provider location):  [facility] [place]     Platform used for Video Visit: Incentient    During this virtual visit the patient is located in [place], patient verifies this as the location during the entirety of this visit.     [facility] Outpatient Medical Nutrition Therapy      Time Spent:  38 minutes  Session Type: Follow-up   Referring Physician:  Mandeep Park MD  Reason for RD Visit:   Adult failure to thrive, gastroparesis, pancreatic insufficiency, unintended weight loss and will have G-J feeding tube placed.     Nutrition Assessment:  Patient is a 56 " year old female with history of gastroparesis, hx TPIAT, post pancreatectomy diabetes who c/o of having discomfort, pain, bloating with everything that she eats and even with drinking. She has had some recent vomiting as well. She has been drinking a soylent plant based protein drinks as these are the only ones she tolerates, but it takes her 1 hour to drink 8 oz of the drink. She tried making some homemade juice and double strained to remove pulp but this caused extreme pain. She is also eating some broth based soups and only bites of food such as butternut squash and a small amount of crackers. She is drinking 60 oz of water but tolerates it better if it's warmer. She finding it harder and harder to tolerate any food. She reported losing about 20 lbs since her last visit with writer in Dec 2020. She is feeling very weak as well. She has had a feeding tube in the past but had a bad experience all around with having one, so was hesitant to have another one but after recent visit/discussions with MD, and her inability to tolerate or take in much orally, she is going to have a G-J feeding tube placed for nutrition. Based on discussion with her and per MD noted, plan to place tube as close to pylorus as possible. She stated that previously it took her a while to get to goal rate, so slowly increased rate. She remembers dissolving enzymes in bicarbonate and warm water and adding to TF formula, so just will need recommendation when she has her FT placed on how many and timing.    Due to her chronic pain, she is taking warm baths, using a heating pad to try to relieve some pain. Takes tylenol and also compazine for nausea. She reported that yesterday, she had 3 BMs yesterday but none for a few days before that. She tries to do some walking but if she does too much then gets dehydrated. She has been trying to eat 1200 calories per day but is not able to meet this amount.    Height:   Ht Readings from Last 1 Encounters:  "  08/04/21 1.721 m (5' 7.75\")     Weight: Reported most recent body weight was 135 lbs  Wt Readings from Last 10 Encounters:   08/09/21 63.5 kg (140 lb)   08/05/21 64.4 kg (142 lb)   08/04/21 65 kg (143 lb 4.8 oz)   07/14/21 65.3 kg (144 lb)   06/28/21 65.3 kg (144 lb)   06/04/21 67.1 kg (148 lb)   04/01/21 69.5 kg (153 lb 4.8 oz)   03/26/21 67.1 kg (148 lb)   01/15/21 68 kg (150 lb)   12/18/20 69.9 kg (154 lb)     BMI: Estimated body mass index is 21.44 kg/m  as calculated from the following:    Height as of 8/4/21: 1.721 m (5' 7.75\").    Weight as of 8/9/21: 63.5 kg (140 lb).    Diet Recall:  (some usual/recent meals/snacks/beverages):  Meal Food    Breakfast soylent protein drink   Lunch soylent protein drink   Dinner Yesterday: 2 crackers and butternut squash soup   Snacks    Beverages fairlife lactose free milk, pomergranate juice and 60 oz water, + 2 soylent plant based protein drink (see above)          Labs:    Last Comprehensive Metabolic Panel:  Sodium   Date Value Ref Range Status   08/07/2021 136 133 - 144 mmol/L Final   12/17/2020 142 133 - 144 mmol/L Final     Potassium   Date Value Ref Range Status   08/07/2021 4.1 3.4 - 5.3 mmol/L Final   12/17/2020 3.5 3.4 - 5.3 mmol/L Final     Chloride   Date Value Ref Range Status   08/07/2021 104 94 - 109 mmol/L Final   12/17/2020 106 94 - 109 mmol/L Final     Carbon Dioxide   Date Value Ref Range Status   12/17/2020 32 20 - 32 mmol/L Final     Carbon Dioxide (CO2)   Date Value Ref Range Status   08/07/2021 27 20 - 32 mmol/L Final     Anion Gap   Date Value Ref Range Status   08/07/2021 5 3 - 14 mmol/L Final   12/17/2020 4 3 - 14 mmol/L Final     Glucose   Date Value Ref Range Status   08/07/2021 100 (H) 70 - 99 mg/dL Final   12/17/2020 120 (H) 70 - 99 mg/dL Final     Urea Nitrogen   Date Value Ref Range Status   08/07/2021 22 7 - 30 mg/dL Final   12/17/2020 9 7 - 30 mg/dL Final     Creatinine   Date Value Ref Range Status   08/07/2021 0.81 0.52 - 1.04 mg/dL " Final   12/17/2020 0.78 0.52 - 1.04 mg/dL Final     GFR Estimate   Date Value Ref Range Status   08/07/2021 82 >60 mL/min/1.73m2 Final     Comment:     As of July 11, 2021, eGFR is calculated by the CKD-EPI creatinine equation, without race adjustment. eGFR can be influenced by muscle mass, exercise, and diet. The reported eGFR is an estimation only and is only applicable if the renal function is stable.   12/17/2020 85 >60 mL/min/[1.73_m2] Final     Comment:     Non  GFR Calc  Starting 12/18/2018, serum creatinine based estimated GFR (eGFR) will be   calculated using the Chronic Kidney Disease Epidemiology Collaboration   (CKD-EPI) equation.       Calcium   Date Value Ref Range Status   08/07/2021 9.4 8.5 - 10.1 mg/dL Final   12/17/2020 9.1 8.5 - 10.1 mg/dL Final     CBC RESULTS:   Recent Labs   Lab Test 08/07/21 2029   WBC 5.7   RBC 4.29   HGB 13.4   HCT 40.3   MCV 94   MCH 31.2   MCHC 33.3   RDW 13.2          Pertinent Medications/vitamin and mineral supplements:      Current Outpatient Medications   Medication     acarbose (PRECOSE) 50 MG tablet     amitriptyline (ELAVIL) 10 MG tablet     amylase-lipase-protease (CREON 24) 09598-72435 units CPEP per EC capsule     azelastine (ASTELIN) 0.1 % nasal spray     blood glucose (PAT CONTOUR NEXT) test strip     blood glucose monitoring (PAT MICROLET) lancets     cetirizine (ZYRTEC) 10 MG tablet     Continuous Blood Gluc Sensor (FREESTYLE LISA 2 SENSOR) MISC     diphenhydrAMINE (BENADRYL ALLERGY) 25 MG capsule     estradiol (VAGIFEM) 10 MCG TABS vaginal tablet     famotidine (PEPCID) 20 MG tablet     FOLIC ACID PO     glucose 40 % GEL gel     ibuprofen (ADVIL/MOTRIN) 400 MG tablet     levothyroxine (SYNTHROID) 137 MCG tablet     montelukast (SINGULAIR) 10 MG tablet     multivitamin CF formula (CHOICEFUL) capsule     omeprazole (PRILOSEC) 40 MG DR capsule     order for DME     polyethylene glycol (MIRALAX) 17 GM/Dose powder      prochlorperazine (COMPAZINE) 5 MG tablet     promethazine (PHENERGAN) 25 MG tablet     Prucalopride Succinate (MOTEGRITY) 2 MG TABS     scopolamine (TRANSDERM) 1 MG/3DAYS 72 hr patch     senna-docusate (SENOKOT-S;PERICOLACE) 8.6-50 MG per tablet     Sharps Container (BD SHARPS ) MISC     ZOLMitriptan (ZOMIG) 5 MG tablet     No current facility-administered medications for this visit.       Food Allergies:  Allergy to apple  Physical Activity:  Tries to do some walking but gets dehydrated if does too much walking  Estimated Nutrition Needs based on ideal body weight of ~139 lbs (63 kg):   5044-9711 calories (25-30 kcals/kg), 76g-95g protein (1.2-1.5g/kg), ~1 ml/kcal or total fluids per MD.     MALNUTRITION:  % Weight Loss:  > 7.5% in 3 months (severe malnutrition)  % Intake:  </= 75% for >/= 1 month (severe malnutrition)  Subcutaneous Fat Loss:  None observed  Muscle Loss:  None observed and did not perform nutrition focused physical exam  Fluid Retention:  None noted    Malnutrition Diagnosis: Severe malnutrition  In Context of:  Chronic illness or disease    Nutrition Diagnosis:    Inadequate oral intake related to chronic/significant pain with eating, bloating, hx altered GI function as evidenced by pt report, diet recall and significant weight loss of ~9% over the past 2 months.    Nutrition Prescription: enteral nutrition    Tube Feeding Recommendations for MD:  Peptamen 1.5 @ goal 50 ml/hr (1200 ml/day) to provide 1800 kcals (29.5 kcal/kg of current weight/day), 82 g PRO (1.3 g/kg current weight/day), 924 ml free H2O, 67 g Fat (70% from MCTs), 226 g CHO and no Fiber daily.    --Start at 15 mL/hr and increase by 15-20 mL q 6-8 hours to goal of 50 ml/hr.     --Recommend 150 ml water flushes, 4 times per day. (+ any additional oral fluids she can drink).     -When patient tolerates continuous goal volume for at least 24 hours, then recommend gradually increasing tube feeding by 20 ml/hr until goal rate of  100 ml/hr for 12 hours per day for cycled schedule.     -When she's ready to increase tube feeding rate, recommend:  Day 1, increase to 70 ml/hr x 17 hours. If tolerated at 70 ml/hr then   Day 2, increased to 90 ml/hr x 13 hours. If tolerated at 90 ml/hr then  Day 3, increase to 100 ml x 12 hours per day.      --Tube feeding enzymes:   When tube feeding running continuously at 50 ml/hr, then 1 capsule every 4 hours of Creon 24,000 to provide 144,000 units lipase/day.   When tube feeding running at 100 ml/hr x 12 hours, then 2 capsules every 4 hours of Creon 24,000 to provide 144,000 units lipase/day.    To administer Enzymes with TF:     A) Sodium Bicarb tablet (325 mg), 1 tablet q 4 hrs via Jtube. Administration Instructions: Crush 1 tablet and mix into 15 ml of warm water and use this solution to mix with Creon pancreatic enzymes. DO NOT administer directly into Jtube (to be mixed into TF formula with Creon enzyme - see Creon enzyme order below).    B) Creon 24, with continuous tube feeding at 50 ml/hr for 24 hours, then 1 capsule q 4 hrs via Jtube OR when tube feeding running at 100 ml/hr for 12 hours, then 2 capsules of Creon 24,000 every 4 hours via Jtube.     Administration Instructions:   **Open capsule/s and empty contents into 15 ml sodium bicarb solution (see sodium bicarb order), let dissolve for about 20-30 minutes and then add this solution to the amount of TF formula hung in TF bag every 4 hrs *Note: this enzyme regimen with TF @ goal infusion will provide approx 2,507 units of lipase/gram of total Fat daily and approp dosing initially for pancreatic insufficiency with more semi elemental TF formula.     --Continue Oral Enzyme Intake as prescribed: Creon 24,000, 3-4 caps with meals, 1-2 with snacks.     Or any TF adjustments per home infusion RD recommendations/changes.    Nutrition Intervention:    Nutrition Education/Counseling:  Discussed tube feeding and explained that initially recommend  continuous TF and then transition to cyclic overnight or other 12 hour period of time she prefers (as previously she preferred to run during the day). Explained that she can increase rate to goal as tolerated and will receive more specific instructions from MD when tube is placed. Discussed/reviewed process for dissolving enzymes and adding to TF formula. Also explained writer will recommend semi elemental formula based on pt's medical history and explained benefits of this type formula to patient. Explained writer will send specific recommendations to MD and RNCC, and she will get specific instructions/amounts once feeding tube placed. Patient verbalized understanding of education provided. See Goals below.     Told pt writer would message RN CC as pt is interested in tube feeding class/education visit.     Goals:  1. She will have G-J feeding tube placed and start on and tolerate enteral nutrition using semi elemental formula (peptamen 1.5) to meet at least % of estimated nutrition needs. (see TF recs above).    2. Will have enzymes with TF (see recs above).    3. Will continue oral fluids as tolerated.    4. Will follow MD recommendations for oral diet and continue oral enzymes as prescribed with oral intake.    Nutrition Monitoring and Evaluation: Will monitor adherence to nutrition recommendations at future RD visits.     Further Medical Nutrition Therapy:  Follow-up with home infusion RD for TF adjustments. Also writer available for any f/up as needed.    Patient was encouraged to call/contact RD with any further questions.    Quyen Sanz, MS, RD, LD

## 2021-08-23 NOTE — LETTER
8/23/2021         RE: Dinora Mcghee  [address] [facility] [address]        Dear Colleague,    Thank you for referring your patient, Dinora Mcghee, to the [facility] GASTROENTEROLOGY CLINIC [place]. Please see a copy of my visit note below.    [facility] Outpatient Medical Nutrition Therapy      Time Spent:  38 minutes  Session Type: Follow-up   Referring Physician:  Mandeep Park MD  Reason for RD Visit:   Adult failure to thrive, gastroparesis, pancreatic insufficiency, unintended weight loss and will have G-J feeding tube placed.     Nutrition Assessment:  Patient is a 56 year old female with history of gastroparesis, hx TPIAT, post pancreatectomy diabetes who c/o of having discomfort, pain, bloating with everything that she eats and even with drinking. She has had some recent vomiting as well. She has been drinking a soylent plant based protein drinks as these are the only ones she tolerates, but it takes her 1 hour to drink 8 oz of the drink. She tried making some homemade juice and double strained to remove pulp but this caused extreme pain. She is also eating some broth based soups and only bites of food such as butternut squash and a small amount of crackers. She is drinking 60 oz of water but tolerates it better if it's warmer. She finding it harder and harder to tolerate any food. She reported losing about 20 lbs since her last visit with writer in Dec 2020. She is feeling very weak as well. She has had a feeding tube in the past but had a bad experience all around with having one, so was hesitant to have another one but after recent visit/discussions with MD, and her inability to tolerate or take in much orally, she is going to have a G-J feeding tube placed for nutrition. Based on discussion with her and per MD noted, plan to place tube as close to pylorus as possible. She stated that previously it took her a while to get to goal rate, so slowly increased rate. She  "remembers dissolving enzymes in bicarbonate and warm water and adding to TF formula, so just will need recommendation when she has her FT placed on how many and timing.    Due to her chronic pain, she is taking warm baths, using a heating pad to try to relieve some pain. Takes tylenol and also compazine for nausea. She reported that yesterday, she had 3 BMs yesterday but none for a few days before that. She tries to do some walking but if she does too much then gets dehydrated. She has been trying to eat 1200 calories per day but is not able to meet this amount.    Height:   Ht Readings from Last 1 Encounters:   08/04/21 1.721 m (5' 7.75\")     Weight: Reported most recent body weight was 135 lbs  Wt Readings from Last 10 Encounters:   08/09/21 63.5 kg (140 lb)   08/05/21 64.4 kg (142 lb)   08/04/21 65 kg (143 lb 4.8 oz)   07/14/21 65.3 kg (144 lb)   06/28/21 65.3 kg (144 lb)   06/04/21 67.1 kg (148 lb)   04/01/21 69.5 kg (153 lb 4.8 oz)   03/26/21 67.1 kg (148 lb)   01/15/21 68 kg (150 lb)   12/18/20 69.9 kg (154 lb)     BMI: Estimated body mass index is 21.44 kg/m  as calculated from the following:    Height as of 8/4/21: 1.721 m (5' 7.75\").    Weight as of 8/9/21: 63.5 kg (140 lb).    Diet Recall:  (some usual/recent meals/snacks/beverages):  Meal Food    Breakfast soylent protein drink   Lunch soylent protein drink   Dinner Yesterday: 2 crackers and butternut squash soup   Snacks    Beverages fairlife lactose free milk, pomergranate juice and 60 oz water, + 2 soylent plant based protein drink (see above)          Labs:    Last Comprehensive Metabolic Panel:  Sodium   Date Value Ref Range Status   08/07/2021 136 133 - 144 mmol/L Final   12/17/2020 142 133 - 144 mmol/L Final     Potassium   Date Value Ref Range Status   08/07/2021 4.1 3.4 - 5.3 mmol/L Final   12/17/2020 3.5 3.4 - 5.3 mmol/L Final     Chloride   Date Value Ref Range Status   08/07/2021 104 94 - 109 mmol/L Final   12/17/2020 106 94 - 109 mmol/L Final "     Carbon Dioxide   Date Value Ref Range Status   12/17/2020 32 20 - 32 mmol/L Final     Carbon Dioxide (CO2)   Date Value Ref Range Status   08/07/2021 27 20 - 32 mmol/L Final     Anion Gap   Date Value Ref Range Status   08/07/2021 5 3 - 14 mmol/L Final   12/17/2020 4 3 - 14 mmol/L Final     Glucose   Date Value Ref Range Status   08/07/2021 100 (H) 70 - 99 mg/dL Final   12/17/2020 120 (H) 70 - 99 mg/dL Final     Urea Nitrogen   Date Value Ref Range Status   08/07/2021 22 7 - 30 mg/dL Final   12/17/2020 9 7 - 30 mg/dL Final     Creatinine   Date Value Ref Range Status   08/07/2021 0.81 0.52 - 1.04 mg/dL Final   12/17/2020 0.78 0.52 - 1.04 mg/dL Final     GFR Estimate   Date Value Ref Range Status   08/07/2021 82 >60 mL/min/1.73m2 Final     Comment:     As of July 11, 2021, eGFR is calculated by the CKD-EPI creatinine equation, without race adjustment. eGFR can be influenced by muscle mass, exercise, and diet. The reported eGFR is an estimation only and is only applicable if the renal function is stable.   12/17/2020 85 >60 mL/min/[1.73_m2] Final     Comment:     Non  GFR Calc  Starting 12/18/2018, serum creatinine based estimated GFR (eGFR) will be   calculated using the Chronic Kidney Disease Epidemiology Collaboration   (CKD-EPI) equation.       Calcium   Date Value Ref Range Status   08/07/2021 9.4 8.5 - 10.1 mg/dL Final   12/17/2020 9.1 8.5 - 10.1 mg/dL Final     CBC RESULTS:   Recent Labs   Lab Test 08/07/21 2029   WBC 5.7   RBC 4.29   HGB 13.4   HCT 40.3   MCV 94   MCH 31.2   MCHC 33.3   RDW 13.2          Pertinent Medications/vitamin and mineral supplements:      Current Outpatient Medications   Medication     acarbose (PRECOSE) 50 MG tablet     amitriptyline (ELAVIL) 10 MG tablet     amylase-lipase-protease (CREON 24) 26997-35678 units CPEP per EC capsule     azelastine (ASTELIN) 0.1 % nasal spray     blood glucose (PAT CONTOUR NEXT) test strip     blood glucose monitoring (PAT  MICROLET) lancets     cetirizine (ZYRTEC) 10 MG tablet     Continuous Blood Gluc Sensor (FREESTYLE LISA 2 SENSOR) MISC     diphenhydrAMINE (BENADRYL ALLERGY) 25 MG capsule     estradiol (VAGIFEM) 10 MCG TABS vaginal tablet     famotidine (PEPCID) 20 MG tablet     FOLIC ACID PO     glucose 40 % GEL gel     ibuprofen (ADVIL/MOTRIN) 400 MG tablet     levothyroxine (SYNTHROID) 137 MCG tablet     montelukast (SINGULAIR) 10 MG tablet     multivitamin CF formula (CHOICEFUL) capsule     omeprazole (PRILOSEC) 40 MG DR capsule     order for DME     polyethylene glycol (MIRALAX) 17 GM/Dose powder     prochlorperazine (COMPAZINE) 5 MG tablet     promethazine (PHENERGAN) 25 MG tablet     Prucalopride Succinate (MOTEGRITY) 2 MG TABS     scopolamine (TRANSDERM) 1 MG/3DAYS 72 hr patch     senna-docusate (SENOKOT-S;PERICOLACE) 8.6-50 MG per tablet     Sharps Container (BD SHARPS ) MISC     ZOLMitriptan (ZOMIG) 5 MG tablet     No current facility-administered medications for this visit.       Food Allergies:  Allergy to apple  Physical Activity:  Tries to do some walking but gets dehydrated if does too much walking  Estimated Nutrition Needs based on ideal body weight of ~139 lbs (63 kg):   8684-0391 calories (25-30 kcals/kg), 76g-95g protein (1.2-1.5g/kg), ~1 ml/kcal or total fluids per MD.     MALNUTRITION:  % Weight Loss:  > 7.5% in 3 months (severe malnutrition)  % Intake:  </= 75% for >/= 1 month (severe malnutrition)  Subcutaneous Fat Loss:  None observed  Muscle Loss:  None observed and did not perform nutrition focused physical exam  Fluid Retention:  None noted    Malnutrition Diagnosis: Severe malnutrition  In Context of:  Chronic illness or disease    Nutrition Diagnosis:    Inadequate oral intake related to chronic/significant pain with eating, bloating, hx altered GI function as evidenced by pt report, diet recall and significant weight loss of ~9% over the past 2 months.    Nutrition Prescription: enteral  nutrition    Tube Feeding Recommendations for MD:  Peptamen 1.5 @ goal 50 ml/hr (1200 ml/day) to provide 1800 kcals (29.5 kcal/kg of current weight/day), 82 g PRO (1.3 g/kg current weight/day), 924 ml free H2O, 67 g Fat (70% from MCTs), 226 g CHO and no Fiber daily.    --Start at 15 mL/hr and increase by 15-20 mL q 6-8 hours to goal of 50 ml/hr.     --Recommend 150 ml water flushes, 4 times per day. (+ any additional oral fluids she can drink).     -When patient tolerates continuous goal volume for at least 24 hours, then recommend gradually increasing tube feeding by 20 ml/hr until goal rate of 100 ml/hr for 12 hours per day for cycled schedule.     -When she's ready to increase tube feeding rate, recommend:  Day 1, increase to 70 ml/hr x 17 hours. If tolerated at 70 ml/hr then   Day 2, increased to 90 ml/hr x 13 hours. If tolerated at 90 ml/hr then  Day 3, increase to 100 ml x 12 hours per day.      --Tube feeding enzymes:   When tube feeding running continuously at 50 ml/hr, then 1 capsule every 4 hours of Creon 24,000 to provide 144,000 units lipase/day.   When tube feeding running at 100 ml/hr x 12 hours, then 2 capsules every 4 hours of Creon 24,000 to provide 144,000 units lipase/day.    To administer Enzymes with TF:     A) Sodium Bicarb tablet (325 mg), 1 tablet q 4 hrs via Jtube. Administration Instructions: Crush 1 tablet and mix into 15 ml of warm water and use this solution to mix with Creon pancreatic enzymes. DO NOT administer directly into Jtube (to be mixed into TF formula with Creon enzyme - see Creon enzyme order below).    B) Creon 24, with continuous tube feeding at 50 ml/hr for 24 hours, then 1 capsule q 4 hrs via Jtube OR when tube feeding running at 100 ml/hr for 12 hours, then 2 capsules of Creon 24,000 every 4 hours via Jtube.     Administration Instructions:   **Open capsule/s and empty contents into 15 ml sodium bicarb solution (see sodium bicarb order), let dissolve for about 20-30  minutes and then add this solution to the amount of TF formula hung in TF bag every 4 hrs *Note: this enzyme regimen with TF @ goal infusion will provide approx 2,507 units of lipase/gram of total Fat daily and approp dosing initially for pancreatic insufficiency with more semi elemental TF formula.     --Continue Oral Enzyme Intake as prescribed: Creon 24,000, 3-4 caps with meals, 1-2 with snacks.     Or any TF adjustments per home infusion RD recommendations/changes.    Nutrition Intervention:    Nutrition Education/Counseling:  Discussed tube feeding and explained that initially recommend continuous TF and then transition to cyclic overnight or other 12 hour period of time she prefers (as previously she preferred to run during the day). Explained that she can increase rate to goal as tolerated and will receive more specific instructions from MD when tube is placed. Discussed/reviewed process for dissolving enzymes and adding to TF formula. Also explained writer will recommend semi elemental formula based on pt's medical history and explained benefits of this type formula to patient. Explained writer will send specific recommendations to MD and RNCC, and she will get specific instructions/amounts once feeding tube placed. Patient verbalized understanding of education provided. See Goals below.     Told pt writer would message RN CC as pt is interested in tube feeding class/education visit.     Goals:  1. She will have G-J feeding tube placed and start on and tolerate enteral nutrition using semi elemental formula (peptamen 1.5) to meet at least % of estimated nutrition needs. (see TF recs above).    2. Will have enzymes with TF (see recs above).    3. Will continue oral fluids as tolerated.    4. Will follow MD recommendations for oral diet and continue oral enzymes as prescribed with oral intake.    Nutrition Monitoring and Evaluation: Will monitor adherence to nutrition recommendations at future RD visits.      Further Medical Nutrition Therapy:  Follow-up with home infusion RD for TF adjustments. Also writer available for any f/up as needed.    Patient was encouraged to call/contact RD with any further questions.    Quyen Sanz, MS, RD, LD          Again, thank you for allowing me to participate in the care of your patient.      Sincerely,    Quyen Sanz RD

## 2021-08-24 ENCOUNTER — HOSPITAL ENCOUNTER (OUTPATIENT)
Dept: NUCLEAR MEDICINE | Facility: CLINIC | Age: 56
Setting detail: NUCLEAR MEDICINE
Discharge: HOME | End: 2021-08-24
Attending: INTERNAL MEDICINE | Admitting: INTERNAL MEDICINE
Payer: COMMERCIAL

## 2021-08-24 ENCOUNTER — HOSPITAL ENCOUNTER (OUTPATIENT)
Dept: CT IMAGING | Facility: CLINIC | Age: 56
Discharge: HOME | End: 2021-08-24
Attending: INTERNAL MEDICINE | Admitting: INTERNAL MEDICINE
Payer: COMMERCIAL

## 2021-08-24 DIAGNOSIS — K31.84 GASTROPARESIS: ICD-10-CM

## 2021-08-24 DIAGNOSIS — R63.4 WEIGHT LOSS: ICD-10-CM

## 2021-08-24 DIAGNOSIS — G89.4 CHRONIC PAIN SYNDROME: ICD-10-CM

## 2021-08-24 DIAGNOSIS — R10.84 ABDOMINAL PAIN, GENERALIZED: ICD-10-CM

## 2021-08-24 PROCEDURE — A9541 TC99M SULFUR COLLOID: HCPCS | Performed by: INTERNAL MEDICINE

## 2021-08-24 PROCEDURE — 74174 CTA ABD&PLVS W/CONTRAST: CPT | Mod: 26 | Performed by: RADIOLOGY

## 2021-08-24 PROCEDURE — 250N000009 HC RX 250: Performed by: INTERNAL MEDICINE

## 2021-08-24 PROCEDURE — 74174 CTA ABD&PLVS W/CONTRAST: CPT

## 2021-08-24 PROCEDURE — 78264 GASTRIC EMPTYING IMG STUDY: CPT | Mod: 26

## 2021-08-24 PROCEDURE — 250N000011 HC RX IP 250 OP 636: Performed by: INTERNAL MEDICINE

## 2021-08-24 PROCEDURE — 78264 GASTRIC EMPTYING IMG STUDY: CPT

## 2021-08-24 PROCEDURE — 343N000001 HC RX 343: Performed by: INTERNAL MEDICINE

## 2021-08-24 RX ORDER — IOPAMIDOL 755 MG/ML
100 INJECTION, SOLUTION INTRAVASCULAR ONCE
Status: COMPLETED | OUTPATIENT
Start: 2021-08-24 | End: 2021-08-24

## 2021-08-24 RX ADMIN — SODIUM CHLORIDE, PRESERVATIVE FREE 100 ML: 5 INJECTION INTRAVENOUS at 13:13

## 2021-08-24 RX ADMIN — IOPAMIDOL 100 ML: 755 INJECTION, SOLUTION INTRAVENOUS at 13:13

## 2021-08-24 RX ADMIN — Medication 2 MILLICURIE: at 08:25

## 2021-08-25 NOTE — TELEPHONE ENCOUNTER
I'll be prepared.  Thanks for the heads up. If you want to psotpone for awhile while in the middle of eval with Dr. Park, that's ok too.     Latasha INGRAM, CNP

## 2021-08-26 ENCOUNTER — ANCILLARY PROCEDURE (OUTPATIENT)
Dept: MRI IMAGING | Facility: CLINIC | Age: 56
End: 2021-08-26
Attending: PHYSICIAN ASSISTANT
Payer: COMMERCIAL

## 2021-08-26 DIAGNOSIS — R79.89 ELEVATED LFTS: ICD-10-CM

## 2021-08-26 DIAGNOSIS — R76.8 IGG4 SELECTIVELY HIGH IN PLASMA: ICD-10-CM

## 2021-08-26 PROCEDURE — A9585 GADOBUTROL INJECTION: HCPCS | Performed by: RADIOLOGY

## 2021-08-26 PROCEDURE — 74183 MRI ABD W/O CNTR FLWD CNTR: CPT | Mod: GC | Performed by: RADIOLOGY

## 2021-08-26 RX ORDER — GADOBUTROL 604.72 MG/ML
7.5 INJECTION INTRAVENOUS ONCE
Status: COMPLETED | OUTPATIENT
Start: 2021-08-26 | End: 2021-08-26

## 2021-08-26 RX ADMIN — GADOBUTROL 6.5 ML: 604.72 INJECTION INTRAVENOUS at 16:59

## 2021-08-26 NOTE — DISCHARGE INSTRUCTIONS
MRI Contrast Discharge Instructions    The IV contrast you received today will pass out of your body in your  urine. This will happen in the next 24 hours. You will not feel this process.  Your urine will not change color.    Drink at least 4 extra glasses of water or juice today (unless your doctor  has restricted your fluids). This reduces the stress on your kidneys.  You may take your regular medicines.    If you are on dialysis: It is best to have dialysis today.    If you have a reaction: Most reactions happen right away. If you have  any new symptoms after leaving the hospital (such as hives or swelling),  call your hospital at the correct number below. Or call your family doctor.  If you have breathing distress or wheezing, call 911.    Special instructions: ***    I have read and understand the above information.    Signature:______________________________________ Date:___________    Staff:__________________________________________ Date:___________     Time:__________    [facility] Departments:    ___[place]: [phone]  ___[facility]: [phone]  ___[place]: [phone] ___[facility]: [phone]  ___[facility]: [phone]  ___[facility]: [phone]  ___[phone]  ___[facility]: [phone]  ___[phone]

## 2021-08-27 ENCOUNTER — VIRTUAL VISIT (OUTPATIENT)
Dept: GASTROENTEROLOGY | Facility: CLINIC | Age: 56
End: 2021-08-27
Attending: INTERNAL MEDICINE
Payer: COMMERCIAL

## 2021-08-27 DIAGNOSIS — R76.8 IGG4 SELECTIVELY HIGH IN PLASMA: Primary | ICD-10-CM

## 2021-08-27 PROCEDURE — 99213 OFFICE O/P EST LOW 20 MIN: CPT | Mod: 95 | Performed by: INTERNAL MEDICINE

## 2021-08-27 NOTE — LETTER
8/27/2021         RE: Dinora Mcghee  [address] [facility] [address]        Dear Colleague,    Thank you for referring your patient, Dinora Mcghee, to the [facility] HEPATOLOGY CLINIC [place]. Please see a copy of my visit note below.    [facility]  LIVER CLINIC FOLLOW UP  VIDEO VISIT  ASSESSMENT AND PLAN: Ms. 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  Mildly elevated IgG4 level in her serum.    Recently seen in our clinic for elevation in LFTs higher than her baseline, which have now returned to baseline. MR 8/26/21 unchanged and unrevealing.      IgG 4 disease can cause a sclerosing cholangitis but there is no evidence of this at this time. If she were to require treatment for IgG4 disease, typically it would be with steroids.  Rituximab is another option. At this time there is no compelling need for any treatment. She has followed with Dr. Becerra for other possible IgG4 related symptoms. Last several IgG4 levels stable 80-90.     Her other symptoms are being assessed and managed by Elizabeth Park and Birgit in GI.    Follow up prn.    Ara Lugo MD    Hepatology/Liver Transplant  Medical Director, Liver Transplantation  [facility]    Appointments [phone]  Clinic Fax [phone]  Transplant Care [phone] Option 4  Transplant Fax [phone]  Administrative Office [phone]  Administrative Fax [phone]  ===================================================================  SUBJECTIVE:  Dinora Loo is a 56 Y F who underwent TPAIT on 12/28/2016 for chronic pancreatitis.  She had a previously noted liver lesion that was biopsied during that time, and this showed liver with noticeable proliferation and acute and chronic inflammation and fibrosis.  There was focally increased IgG4-positive plasma cells, consistent with IgG4 pseudotumor.  Her IgG4 level on 01/31/2017 was mildly  "elevated at 106 (upper limits of normal 86).     Recently her main issues have been around her severe gastroparesis.      Prior to her TPAIT, she had chronic pancreatitis, and underwent ERCPs and MRCPs. Some of those studies showed mild prominence intrahepatic bile ducts related to possible cholangitis previously.  It appeared similar to 04/29/2016.     Her liver tests have been elevated, predominately alk phos, and these are improving. Most elevations of ALT and AST have been in the 1.5-2x above normal. Alk phos has been as high as 600s, but in last few years has been in the 100s. Recently LFTs increased and she was seen by Reggie Wilekrson in our clinic. We ordered and MRCP (unrevelaing/stable) and followup liver tests.     PAST MEDICAL HISTORY:  Other than what is listed above, status post RORY/BSO, appendectomy, and history of a pituitary adenoma.      MEDICATIONS:  Reviewed.      FAMILY HISTORY:  Parents are both still alive.  Father has some sort of immune joint destructive condition that is not rheumatoid arthritis, psoriatic arthritis or ankylosing spondylitis.  She does not know what it is exactly.  There is thyroid disease in her sister and her mother.  She has healthy children (3).      SOCIAL HISTORY:  She is . She does not smoke or drink alcohol.  She was previously a .      REVIEW OF SYSTEMS:  Comprehensive review of systems is otherwise negative, except what is listed above in the history of present illness.      PHYSICAL EXAMINATION:   Exam  Gen Alert pleasant NAD  Resp No difficulty breathing. No cough  Skin No Jaundice  Eyes No icterus  Neuro THOMAS  MSK no muscle wasting  Psyche Pleasant, appropriate. Well groomed.  Dinora Mcghee is a 56 year old female who is being evaluated via a billable video visit.      The patient has been notified of following:   \"This video visit will be conducted via a call between you and your physician/provider. We have found that certain health care needs " "can be provided without the need for an in-person physical exam.  This service lets us provide the care you need with a video conversation.  If a prescription is necessary we can send it directly to your pharmacy.  If lab work is needed we can place an order for that and you can then stop by our lab to have the test done at a later time. Video visits are billed at different rates depending on your insurance coverage.  Please reach out to your insurance provider with any questions.  If during the course of the call the physician/provider feels a video visit is not appropriate, you will not be charged for this service.\"   Patient has given verbal consent for Video visit? Yes    Type of service:  Video Visit  Video Start Time 1023  Video End Time 1035  Patient location: home  Will anyone else be joining your video visit?   {If patient encounters technical issues they should call [phone] :1  Distant Location (provider location):  [facility] HEPATOLOGY CLINIC [place]   Mode of Communication:  Video Conference via [employer]  I have reviewed and updated the patient's Past Medical History, Social History, Family History and Medication List.              Again, thank you for allowing me to participate in the care of your patient.        Sincerely,        Ara Lugo MD    "

## 2021-08-27 NOTE — PROGRESS NOTES
[facility]  LIVER CLINIC FOLLOW UP  VIDEO VISIT  ASSESSMENT AND PLAN: Ms. 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  Mildly elevated IgG4 level in her serum.    Recently seen in our clinic for elevation in LFTs higher than her baseline, which have now returned to baseline. MR 8/26/21 unchanged and unrevealing.      IgG 4 disease can cause a sclerosing cholangitis but there is no evidence of this at this time. If she were to require treatment for IgG4 disease, typically it would be with steroids.  Rituximab is another option. At this time there is no compelling need for any treatment. She has followed with Dr. Becerra for other possible IgG4 related symptoms. Last several IgG4 levels stable 80-90.     Her other symptoms are being assessed and managed by Elizabeth Park and Birgit in GI.    Follow up prn.    Ara Lugo MD    Hepatology/Liver Transplant  Medical Director, Liver Transplantation  [facility]    Appointments [phone]  Clinic Fax [phone]  Transplant Care [phone] Option 4  Transplant Fax [phone]  Administrative Office [phone]  Administrative Fax [phone]  ===================================================================  SUBJECTIVE:  Dinora Loo is a 56 Y F who underwent TPAIT on 12/28/2016 for chronic pancreatitis.  She had a previously noted liver lesion that was biopsied during that time, and this showed liver with noticeable proliferation and acute and chronic inflammation and fibrosis.  There was focally increased IgG4-positive plasma cells, consistent with IgG4 pseudotumor.  Her IgG4 level on 01/31/2017 was mildly elevated at 106 (upper limits of normal 86).     Recently her main issues have been around her severe gastroparesis.      Prior to her TPAIT, she had chronic pancreatitis, and underwent ERCPs and MRCPs. Some of those studies showed mild prominence intrahepatic bile  "ducts related to possible cholangitis previously.  It appeared similar to 04/29/2016.     Her liver tests have been elevated, predominately alk phos, and these are improving. Most elevations of ALT and AST have been in the 1.5-2x above normal. Alk phos has been as high as 600s, but in last few years has been in the 100s. Recently LFTs increased and she was seen by Reggie Wilkerson in our clinic. We ordered and MRCP (unrevelaing/stable) and followup liver tests.     PAST MEDICAL HISTORY:  Other than what is listed above, status post RORY/BSO, appendectomy, and history of a pituitary adenoma.      MEDICATIONS:  Reviewed.      FAMILY HISTORY:  Parents are both still alive.  Father has some sort of immune joint destructive condition that is not rheumatoid arthritis, psoriatic arthritis or ankylosing spondylitis.  She does not know what it is exactly.  There is thyroid disease in her sister and her mother.  She has healthy children (3).      SOCIAL HISTORY:  She is . She does not smoke or drink alcohol.  She was previously a .      REVIEW OF SYSTEMS:  Comprehensive review of systems is otherwise negative, except what is listed above in the history of present illness.      PHYSICAL EXAMINATION:   Exam  Gen Alert pleasant NAD  Resp No difficulty breathing. No cough  Skin No Jaundice  Eyes No icterus  Neuro THOMAS  MSK no muscle wasting  Psyche Pleasant, appropriate. Well groomed.  Dinora Mcghee is a 56 year old female who is being evaluated via a billable video visit.      The patient has been notified of following:   \"This video visit will be conducted via a call between you and your physician/provider. We have found that certain health care needs can be provided without the need for an in-person physical exam.  This service lets us provide the care you need with a video conversation.  If a prescription is necessary we can send it directly to your pharmacy.  If lab work is needed we can place an order for " "that and you can then stop by our lab to have the test done at a later time. Video visits are billed at different rates depending on your insurance coverage.  Please reach out to your insurance provider with any questions.  If during the course of the call the physician/provider feels a video visit is not appropriate, you will not be charged for this service.\"   Patient has given verbal consent for Video visit? Yes    Type of service:  Video Visit  Video Start Time 1023  Video End Time 1035  Patient location: home  Will anyone else be joining your video visit?   {If patient encounters technical issues they should call [phone] :1  Distant Location (provider location):  [facility] HEPATOLOGY CLINIC [place]   Mode of Communication:  Video Conference via [employer]  I have reviewed and updated the patient's Past Medical History, Social History, Family History and Medication List.          "

## 2021-08-27 NOTE — PROGRESS NOTES
"Dinora is a 56 year old who is being evaluated via a billable video visit.      How would you like to obtain your AVS? MyChart  If the video visit is dropped, the invitation should be resent by: Text to cell phone: [phone]  Will anyone else be joining your video visit? No  {If patient encounters technical issues they should call [phone] :[id number]}    Video Start Time: {video visit start/end time for provider to select:152948}  Video-Visit Details    Type of service:  Video Visit    Video End Time:{video visit start/end time for provider to select:152948}    Originating Location (pt. Location): {video visit patient location:954401::\"Home\"}    Distant Location (provider location):  [facility] [place]     Platform used for Video Visit: {Virtual Visit Platforms:690524::\"UpMo\"}    "

## 2021-08-30 ENCOUNTER — MYC MEDICAL ADVICE (OUTPATIENT)
Dept: INTERNAL MEDICINE | Facility: CLINIC | Age: 56
End: 2021-08-30

## 2021-08-31 ENCOUNTER — VIRTUAL VISIT (OUTPATIENT)
Dept: SURGERY | Facility: CLINIC | Age: 56
End: 2021-08-31
Payer: COMMERCIAL

## 2021-08-31 ENCOUNTER — ANESTHESIA EVENT (OUTPATIENT)
Dept: SURGERY | Facility: CLINIC | Age: 56
End: 2021-08-31
Payer: COMMERCIAL

## 2021-08-31 ENCOUNTER — PRE VISIT (OUTPATIENT)
Dept: SURGERY | Facility: CLINIC | Age: 56
End: 2021-08-31

## 2021-08-31 DIAGNOSIS — Z01.818 PREOP EXAMINATION: Primary | ICD-10-CM

## 2021-08-31 PROCEDURE — 99204 OFFICE O/P NEW MOD 45 MIN: CPT | Mod: 95 | Performed by: PHYSICIAN ASSISTANT

## 2021-08-31 ASSESSMENT — ENCOUNTER SYMPTOMS: SEIZURES: 0

## 2021-08-31 ASSESSMENT — LIFESTYLE VARIABLES: TOBACCO_USE: 1

## 2021-08-31 ASSESSMENT — PAIN SCALES - GENERAL: PAINLEVEL: MILD PAIN (3)

## 2021-08-31 NOTE — ANESTHESIA PREPROCEDURE EVALUATION
Anesthesia Pre-Procedure Evaluation    Patient: Dinora Mcghee   MRN: [id number] : 1965        Preoperative Diagnosis: Adult failure to thrive [R62.7]   Procedure : Procedure(s):  REPLACEMENT, GASTROJEJUNOSTOMY TUBE, PERCUTANEOUS     Past Medical History:   Diagnosis Date     Allergic rhinitis, cause unspecified      Allergy to other foods     strawberries, apples, celeries, alice, watermelon     Arthritis     left knee     Choledocholithiasis     long after cholecystectomy     Chronic abdominal pain      Chronic constipation      Chronic nausea      Chronic pancreatitis (H)      Degeneration of lumbar or lumbosacral intervertebral disc      Esophageal reflux     w/ hiatal hernia     Gastroparesis      Hiatal hernia      History of pituitary adenoma     s/p resection     Hypothyroidism      Migraines      Mild hyperlipidemia      On tube feeding diet     presence of GJ tube     Pancreatic disease     PD stricture, 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 endometriosis growth     APPENDECTOMY       C  DELIVERY ONLY       C  DELIVERY ONLY      repeat c section with incidental cystotomy with repair     C EXCIS PITUITARY,TRANSNASAL/SEPTAL      pituitary tumor removed for diabetes insipidus     C TOTAL ABDOM HYSTERECTOMY      w/ bilateral salpingoophorectomy       SECTION       COLONOSCOPY       ENDOSCOPIC RETROGRADE CHOLANGIOPANCREATOGRAM N/A 2015    Procedure: ENDOSCOPIC RETROGRADE CHOLANGIOPANCREATOGRAM;  Surgeon: Mandeep Park MD;  [facility]: [facility]     ENDOSCOPIC RETROGRADE CHOLANGIOPANCREATOGRAM N/A 2016    Procedure: COMBINED ENDOSCOPIC RETROGRADE CHOLANGIOPANCREATOGRAPHY, PLACE TUBE/STENT;  Surgeon:  Mandeep Park MD;  [facility]     ENDOSCOPIC RETROGRADE CHOLANGIOPANCREATOGRAM N/A 3/17/2016    Procedure: COMBINED ENDOSCOPIC RETROGRADE CHOLANGIOPANCREATOGRAPHY, REMOVE FOREIGN BODY OR STENT/TUBE;  Surgeon: Mandeep Park MD;  [facility]: [facility]     ENDOSCOPIC RETROGRADE CHOLANGIOPANCREATOGRAM N/A 8/2/2016    Procedure: COMBINED ENDOSCOPIC RETROGRADE CHOLANGIOPANCREATOGRAPHY, PLACE TUBE/STENT;  Surgeon: Mandeep Park MD;  [facility]     ENDOSCOPIC RETROGRADE CHOLANGIOPANCREATOGRAM N/A 8/26/2016    Procedure: COMBINED ENDOSCOPIC RETROGRADE CHOLANGIOPANCREATOGRAPHY, REMOVE FOREIGN BODY OR STENT/TUBE;  Surgeon: Mandeep Park MD;  [facility]     ENDOSCOPIC ULTRASOUND UPPER GASTROINTESTINAL TRACT (GI) N/A 10/3/2016    Procedure: ENDOSCOPIC ULTRASOUND, ESOPHAGOSCOPY / UPPER GASTROINTESTINAL TRACT (GI);  Surgeon: Guru Jose Rodas MD;  [facility]     ESOPHAGOSCOPY, GASTROSCOPY, DUODENOSCOPY (EGD), COMBINED N/A 6/24/2015    Procedure: COMBINED ESOPHAGOSCOPY, GASTROSCOPY, DUODENOSCOPY (EGD), REMOVE FOREIGN BODY;  Surgeon: Mandeep Park MD;  [facility]     ESOPHAGOSCOPY, GASTROSCOPY, DUODENOSCOPY (EGD), COMBINED N/A 10/25/2015    Procedure: COMBINED ESOPHAGOSCOPY, GASTROSCOPY, DUODENOSCOPY (EGD);  Surgeon: Sammy Amaro MD;  [facility]     ESOPHAGOSCOPY, GASTROSCOPY, DUODENOSCOPY (EGD), COMBINED N/A 10/25/2015    Procedure: COMBINED ESOPHAGOSCOPY, GASTROSCOPY, DUODENOSCOPY (EGD), BIOPSY SINGLE OR MULTIPLE;  Surgeon: Sammy Amaro MD;  [facility]     ESOPHAGOSCOPY, GASTROSCOPY, DUODENOSCOPY (EGD), DILATATION, COMBINED       EXCISE LESION TRUNK N/A 4/17/2017    Procedure: EXCISE LESION TRUNK;  Removal of Abdominal Foreign Body;  Surgeon: Nestor Phoenix MD;  [facility]     HC ESOPH/GAS REFLUX TEST W NASAL IMPED >1 HR N/A 11/19/2015    Procedure: ESOPHAGEAL IMPEDENCE FUNCTION TEST WITH 24 HOUR PH GREATER THAN 1  HOUR;  Surgeon: Thiago Apple MD;  Location: [facility]     HC UGI ENDOSCOPY DIAG W BIOPSY  9/17/08     HC UGI ENDOSCOPY DIAG W BIOPSY  9/27/12     HC UGI ENDOSCOPY W ESOPHAGEAL DILATION BALLOON <30MM  9/17/08     HC UGI ENDOSCOPY W EUS N/A 5/5/2015    Procedure: COMBINED ENDOSCOPIC ULTRASOUND, ESOPHAGOSCOPY, GASTROSCOPY, DUODENOSCOPY (EGD);  Surgeon: Wm Dueñas MD;  Location: [facility]     HC WRIST ARTHROSCOP,RELEASE XVERS LIG Bilateral 12/17/08     INJECT TRANSVERSUS ABDOMINIS PLANE (TAP) BLOCK BILATERAL Left 9/22/2016    Procedure: INJECT TRANSVERSUS ABDOMINIS PLANE (TAP) BLOCK BILATERAL;  Surgeon: Dickson Corrigan MD;  [facility]     laparoscopic pineda  1995     LAPAROSCOPIC HERNIORRHAPHY INCISIONAL N/A 8/23/2018    Procedure: LAPAROSCOPIC HERNIORRHAPHY INCISIONAL;  Laparoscopic Incisional Hernia Repair with Symbotex Mesh Implant;  Surgeon: Nestor Phoenix MD;  [facility]     LAPAROSCOPIC PANCREATECTOMY, TRANSPLANT AUTO ISLET CELL N/A 12/28/2016    Procedure: LAPAROSCOPIC PANCREATECTOMY, TRANSPLANT AUTO ISLET CELL;  Surgeon: Nestor Phoenix MD;  Location[facility]     transphenoidal pituitary resection  1990      Allergies   Allergen Reactions     Apple Anaphylaxis     Corticosteroids Other (See Comments)     All oral, IV and injectable steroids are contraindicated (unless in life threatening situations) in [facility] [employer] transplant recipients. They can cause irreversible loss of islet cell function. Please contact patient's transplant care coordinator ADI Gaffney RN at [phone]/pager [phone] and/or endocrinologist prior to administration.       Depakote [Divalproex Sodium] Other (See Comments)     Chest pain     Zithromax [Azithromycin Dihydrate] Anaphylaxis     Noted in 4/7/08 ER     Darvocet [Propoxyphene N-Apap] Itching     Morphine Nausea and Vomiting and Rash     Nalbuphine Hcl Rash     RASH :nubaine     Zosyn [Piperacillin-Tazobactam In D5w] Rash     Possible allergy, did have a  diffuse rash that seemed drug related but could have also been related to soap in the hospital.      Bactrim [Sulfamethoxazole W-Trimethoprim] Other (See Comments) and Nausea and Vomiting     Severely low liver function.     Cats      Compazine [Prochlorperazine] Other (See Comments)     Twitching. Takes Benedryl and is fine     Dust Mites      Grass      Prednisone Other (See Comments)     Insomnia       Ragweeds      Tape [Adhesive Tape] Blisters     Tramadol      Trees      Zofran [Ondansetron] Other (See Comments)     migraine     Flagyl [Metronidazole] Hives and Rash      Social History     Tobacco Use     Smoking status: Former Smoker     Packs/day: 0.50     Years: 6.00     Pack years: 3.00     Types: Cigarettes     Start date: 1985     Quit date: 1992     Years since quittin.6     Smokeless tobacco: Never Used     Tobacco comment: no 2nd hand   Substance Use Topics     Alcohol use: Not Currently     Alcohol/week: 3.0 - 6.0 standard drinks     Types: 1 - 2 Glasses of wine, 1 - 2 Cans of beer, 1 - 2 Shots of liquor per week     Comment: none since IGG      Wt Readings from Last 1 Encounters:   21 63.5 kg (140 lb)        Anesthesia Evaluation   Pt has had prior anesthetic. Type: General.    History of anesthetic complications  - PONV.  (responds well to scopolamine patch).    ROS/MED HX  ENT/Pulmonary: Comment: 3 pk yr hx, quit     (+) allergic rhinitis, tobacco use, Past use,  (-) asthma and sleep apnea   Neurologic:     (+) migraines,  (-) no seizures and no CVA   Cardiovascular:     (+) -----Previous cardiac testing   Echo: Date: Results:    Stress Test: Date: Results:    ECG Reviewed: Date: 21 Results:  Sinus rhythm  Cannot rule out Anterior infarct , age undetermined  Abnormal ECG   Ventricular rate 66 bpm     Cath: Date: Results:      METS/Exercise Tolerance: 4 - Raking leaves, gardening Comment: Endorses SOB when carrying laundry upstairs. Denies CP. +Fatigue, less stamina 2/2  malnutrition     Hematologic:  - neg hematologic  ROS  (-) history of blood clots and history of blood transfusion   Musculoskeletal: Comment: lumbago, chrondromalacia, stiff shoulder  - neg musculoskeletal ROS     GI/Hepatic: Comment: Hx hepatic dome lesion/IgG4 pseudotumor and elevated LFT's of unclear etiology    Chronic pancreatitis    Severe gastroparesis    Malnutrition, wt loss  Hiatal hernia    (+) GERD, Asymptomatic on medication, hiatal hernia,     Renal/Genitourinary:  - neg Renal ROS     Endo: Comment: post-pancreatectomy diabetes; pancreatic insufficiency.     (+) thyroid problem, hypothyroidism,  (-) Type II DM   Psychiatric/Substance Use:  - neg psychiatric ROS     Infectious Disease:  - neg infectious disease ROS     Malignancy:  - neg malignancy ROS     Other:  - neg other ROS          Physical Exam    Airway      Comment: Previous grade I view with Mac 3. 6.5 ETT passed x1 attempt    Mallampati: II   TM distance: > 3 FB   Neck ROM: full   Mouth opening: > 3 cm    Respiratory Devices and Support         Dental           Cardiovascular          Rhythm and rate: regular and normal     Pulmonary   pulmonary exam normal        breath sounds clear to auscultation           OUTSIDE LABS:  CBC:   Lab Results   Component Value Date    WBC 5.7 08/07/2021    WBC 4.5 07/20/2021    HGB 13.4 08/07/2021    HGB 13.4 07/20/2021    HCT 40.3 08/07/2021    HCT 40.4 07/20/2021     08/07/2021     07/20/2021     BMP:   Lab Results   Component Value Date     08/07/2021     07/20/2021    POTASSIUM 4.1 08/07/2021    POTASSIUM 4.0 07/20/2021    CHLORIDE 104 08/07/2021    CHLORIDE 102 07/20/2021    CO2 27 08/07/2021    CO2 32 07/20/2021    BUN 22 08/07/2021    BUN 12 07/20/2021    CR 0.81 08/07/2021    CR 0.68 07/20/2021     (H) 08/07/2021     (H) 07/20/2021     COAGS:   Lab Results   Component Value Date    PTT 29 01/07/2017    INR 1.13 07/20/2021    FIBR 225 12/28/2016     POC:   Lab  Results   Component Value Date    BGM 85 08/24/2018    HCG Negative 12/19/2016     HEPATIC:   Lab Results   Component Value Date    ALBUMIN 4.0 08/07/2021    PROTTOTAL 7.5 08/07/2021    ALT 92 (H) 08/07/2021    AST 42 08/07/2021    ALKPHOS 94 08/07/2021    BILITOTAL 1.0 08/07/2021     OTHER:   Lab Results   Component Value Date    PH 7.49 (H) 12/28/2016    LACT 0.6 (L) 12/30/2016    A1C 5.4 08/05/2021    KASSI 9.4 08/07/2021    PHOS 3.4 07/13/2021    MAG 2.6 (H) 07/13/2021    LIPASE <10 (L) 08/07/2021    AMYLASE 45 01/23/2017    TSH 2.41 09/14/2020    T4 1.13 03/23/2018    CRP 90.0 (H) 12/29/2016    SED 11 10/23/2012       Anesthesia Plan    ASA Status:  3   NPO Status:  NPO Appropriate    Anesthesia Type: General.     - Airway: ETT   Induction: RSI, Intravenous.   Maintenance: TIVA.   Techniques and Equipment:     - Lines/Monitors: BIS     Consents    Anesthesia Plan(s) and associated risks, benefits, and realistic alternatives discussed. Questions answered and patient/representative(s) expressed understanding.     - Discussed with:  Patient         Postoperative Care    Pain management: Multi-modal analgesia, IV analgesics.   PONV prophylaxis: Ondansetron (or other 5HT-3), Scopolamine patch, Background Propofol Infusion     Comments:              PAC Discussion and Assessment    ASA Classification: 3  Case is suitable for: [place]  Anesthetic techniques and relevant risks discussed: GA  Invasive monitoring and risk discussed: No    Possibility and Risk of blood transfusion discussed: No            PAC Resident/NP Anesthesia Assessment: Dinora Mcghee is a 55 y/o female who presents for pre-operative H&P in preparation for REPLACEMENT, GASTROJEJUNOSTOMY TUBE, PERCUTANEOUS with Mandeep Park MD on 9/7/21 at [facility] for treatment of Adult failure to thrive    Pt has had prior anesthetic.     History of anesthetic complications:  PONV  (responds well to scopolamine  patch).    She has the following specific operative considerations:   # YOLY 1/8 = low risk  # VTE risk: 0.26%  # Risk of PONV score = 3.  If > 2, anti-emetic intervention recommended.  # Anesthesia considerations:  Refer to PAC assessment in anesthesia records    # Increased risk of postoperative nausea/vomiting: Recommend use of antiemetic agents in the perioperative period.        CARDIAC: METS 4 -  Endorses SOB when carrying laundry upstairs. Denies CP. +Fatigue, less stamina 2/2 malnutrition     # RCRI : No serious cardiac risks.  0.4% risk of major adverse cardiac event.      PULMONARY:     # Former smoker, 3 pk yr hx, quit 1992    # Allergic rhinitis    # Denies asthma or inhaler use    GI:     # GERD, asymptomatic on prilosec     # Hx hepatic dome lesion/IgG4 pseudotumor and elevated LFT's of unclear etiology    # Chronic pancreatitis    ENDO: BMI 22    # hypothyroidism    # No DM    HEME:     # Has portacath    NEURO/PSYCH:     # Migraines      Patient is optimized and is acceptable candidate for the proposed procedure. No further diagnostic evaluation is needed.    ** Patient's visit was done virtually today.  A full physical exam was not completed.  Please refer to the physical examination documented by the anesthesiologist in the anesthesia record on the day of surgery. **    Final plan per anesthesiologist on day of surgery.       Reviewed and Signed by PAC Mid-Level Provider/Resident  Mid-Level Provider/Resident: Anitra Lr PA-C  Date: 8/31/21  Time: 1156                               Anitra Lr PA-C

## 2021-08-31 NOTE — PROGRESS NOTES
Dinora is a 56 year old who is being evaluated via a billable video visit.      How would you like to obtain your AVS? Mychart    HPI         Review of Systems         Objective    Vitals - Patient Reported  Pain Score: Mild Pain (3)  Pain Loc: Abdomen        Physical Exam     FRANCHESKA Lizarraga LPN

## 2021-08-31 NOTE — H&P
Pre-Operative H & P     CC:  Preoperative exam to assess for increased cardiopulmonary risk while undergoing surgery and anesthesia.    Date of Encounter: 8/31/2021  Primary Care Physician:  Latasha Paul     Reason for Visit: Adult failure to thrive        Video-Visit Details    Type of service:  Video Visit    Patient verbally consented to video service today: YES      Video Start Time: 1012  Video End Time (time video stopped): 1035    Originating Location (pt. Location): Home    Distant Location (provider location):  [facility]     Mode of Communication:  Video Conference via [facility]      Dinora Mcghee is a 55 y/o female who presents for pre-operative H&P in preparation for REPLACEMENT, GASTROJEJUNOSTOMY TUBE, PERCUTANEOUS with Mandeep Park MD on 9/7/21 at [facility] for treatment of Adult failure to thrive. Patient is being evaluated for comorbid conditions of allergic rhinitis, migraines, hypothyroidism, GERD, hiatal hernia, lumbar DDD.      Ms. Mcghee has a history of chronic pancreatitis s/p TPAIT and history of hepatic dome lesion/IgG4 pseudotumor and elevated LFT's of unclear etiology, trending up recently (, , Alk Phos 115).  Liver function is normal. She is also followed by luminal GI for history of abdominal pain, nausea/vomiting and constipation. Over the past six months, she has had worsening nausea/vomiting and weight loss (15 lbs in the last six months). She follows a gastroparesis diet, which previously helped. She also has a significant bowel regimen (see below) and has bowel movements every three days. She now presents for the above procedure.      History was obtained from patient & chart review.     Hx of abnormal bleeding or anti-platelet use: denies    Menstrual history: No LMP recorded. Patient has had a hysterectomy.:      Prior to Admission Medications  Current Outpatient  Medications   Medication Sig Dispense Refill     acarbose (PRECOSE) 50 MG tablet Start with 1 pill (50 mg) per day with each meal but can go up to 2 pills (100 mg) per day if needed. (Patient taking differently: 3 times daily (with meals) Start with 1 pill (50 mg) per day with each meal but can go up to 2 pills (100 mg) per day if needed.) 180 tablet 3     amitriptyline (ELAVIL) 10 MG tablet Take 5 tablets (50 mg) by mouth At Bedtime Take 40 mg at bedtime (Patient taking differently: Take 60 mg by mouth At Bedtime ) 150 tablet 11     amylase-lipase-protease (CREON 24) 51161-18525 units CPEP per EC capsule Take 3-4 capsules by mouth with meals and 1-2 with snacks. Maximum 15 capsules per day. (Patient taking differently: 3 times daily (with meals) Take 3-4 capsules by mouth with meals and 1-2 with snacks. Maximum 15 capsules per day.) 450 capsule 11     azelastine (ASTELIN) 0.1 % nasal spray Spray 1 spray into both nostrils 2 times daily 1 Bottle 11     cetirizine (ZYRTEC) 10 MG tablet Take 1 tablet (10 mg) by mouth daily (Patient taking differently: Take 10 mg by mouth every morning ) 90 tablet 3     Continuous Blood Gluc Sensor (FREESTYLE LISA 2 SENSOR) MISC 1 each every 14 days 2 each 11     diphenhydrAMINE (BENADRYL ALLERGY) 25 MG capsule Take 1 capsule (25 mg) by mouth every 6 hours as needed for itching or allergies 56 capsule 0     estradiol (VAGIFEM) 10 MCG TABS vaginal tablet Place 1 tablet (10 mcg) vaginally twice a week 24 tablet 3     famotidine (PEPCID) 20 MG tablet Take 1 tablet (20 mg) by mouth 2 times daily (Patient taking differently: Take 20 mg by mouth every morning ) 180 tablet 3     FOLIC ACID PO Take 1 mg by mouth every morning        glucose 40 % GEL gel Take 15-30 g by mouth every 15 minutes as needed for low blood sugar 3 Tube 2     ibuprofen (ADVIL/MOTRIN) 400 MG tablet Take 1 tablet (400 mg) by mouth every 6 hours as needed for moderate pain (Patient taking differently: Take 400 mg by  mouth every 6 hours as needed for moderate pain ) 30 tablet 0     levothyroxine (SYNTHROID) 137 MCG tablet Take 1 tablet (137 mcg) by mouth daily (Patient taking differently: Take 137 mcg by mouth every morning ) 90 tablet 3     montelukast (SINGULAIR) 10 MG tablet TAKE 1 TABLET(10 MG) BY MOUTH AT BEDTIME (Patient taking differently: At Bedtime TAKE 1 TABLET(10 MG) BY MOUTH AT BEDTIME) 90 tablet 3     multivitamin CF formula (CHOICEFUL) capsule Take 1 tablet by mouth daily (Patient taking differently: Take 1 tablet by mouth every morning )  11     omeprazole (PRILOSEC) 40 MG DR capsule Take 1 capsule (40 mg) by mouth 2 times daily (Patient taking differently: Take 40 mg by mouth every evening ) 180 capsule 3     polyethylene glycol (MIRALAX) 17 GM/Dose powder Take 17 g (1 capful) by mouth daily (Patient taking differently: Take 1 capful by mouth every morning ) 507 g 11     prochlorperazine (COMPAZINE) 5 MG tablet Take two 5 mg tablets by mouth every six hours as needed for nausea. (Patient taking differently: daily as needed Take two 5 mg tablets by mouth every six hours as needed for nausea.) 90 tablet 3     promethazine (PHENERGAN) 25 MG tablet Take 1 tablet (25 mg) by mouth At Bedtime (Patient taking differently: Take 25 mg by mouth daily as needed ) 90 tablet 3     Prucalopride Succinate (MOTEGRITY) 2 MG TABS Take 2 mg by mouth daily (Patient taking differently: Take 2 mg by mouth every morning ) 30 tablet 11     scopolamine (TRANSDERM) 1 MG/3DAYS 72 hr patch Place 1 patch onto the skin every 72 hours 10 patch 11     senna-docusate (SENOKOT-S;PERICOLACE) 8.6-50 MG per tablet Take 1-2 tablets by mouth 2 times daily (Patient taking differently: Take 1-2 tablets by mouth every morning ) 100 tablet 0     ZOLMitriptan (ZOMIG) 5 MG tablet Take 1 tablet (5 mg) by mouth at onset of headache for migraine 9 tablet 3     blood glucose (PAT CONTOUR NEXT) test strip Use to test blood sugar 6 times daily or as directed. 2  Box 11     blood glucose monitoring (PAT MICROLET) lancets Use to test blood sugar 6-8 times daily or as directed. 100 each 11     order for DME Equipment being ordered:Cefaly 1 Device 0     Sharps Container (BD SHARPS ) MISC 1 Container as needed 1 each 1       Family History  Family History   Problem Relation Age of Onset     Eye Disorder Father         cataract, detached retina     Myocardial Infarction Father 60     Lipids Father      Cerebrovascular Disease Father      Depression Father      Substance Abuse Father      Anesthesia Reaction Father         stroke right after surgery     Cataracts Father      Osteoarthritis Father      Ulcerative Colitis Father      Lipids Mother      Hypertension Mother      Thyroid Disease Mother      Depression Mother      Angina Mother      GERD Mother      Skin Cancer Mother      Eye Disorder Son         ptosis     Eye Disorder Paternal Grandmother         cataract     Eye Disorder Paternal Grandfather         cataract     Diabetes Paternal Grandfather      Eye Disorder Maternal Grandmother         cataract     Thyroid Disease Maternal Grandmother      Coronary Artery Disease Sister         angioplasty     GERD Sister      Substance Abuse Sister      Depression Sister      Depression Son      Anxiety Disorder Son      Thyroid Disease Sister      Diabetes Maternal Grandfather      Depression Nephew      Anxiety Disorder Nephew      Thyroid Disease Nephew      Diabetes Type 2  Cousin         paternal cousin     Heart Disease Paternal Aunt      Diabetes Paternal Aunt      Diabetes Paternal Uncle      Heart Disease Paternal Uncle        The complete review of systems is negative other than noted in the HPI or here.       Anesthesia Pre-Procedure Evaluation    Patient: Dinora Mcghee   MRN: [id number] : 1965        Preoperative Diagnosis: Adult failure to thrive [R62.7]   Procedure : Procedure(s):  REPLACEMENT, GASTROJEJUNOSTOMY TUBE, PERCUTANEOUS     Past  Medical History:   Diagnosis Date     Allergic rhinitis, cause unspecified      Allergy to other foods     strawberries, apples, celeries, alice, watermelon     Arthritis     left knee     Choledocholithiasis     long after cholecystectomy     Chronic abdominal pain      Chronic constipation      Chronic nausea      Chronic pancreatitis (H)      Degeneration of lumbar or lumbosacral intervertebral disc      Esophageal reflux     w/ hiatal hernia     Gastroparesis      Hiatal hernia      History of pituitary adenoma     s/p resection     Hypothyroidism      Migraines      Mild hyperlipidemia      On tube feeding diet     presence of GJ tube     Pancreatic disease     PD stricture, 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 endometriosis growth     APPENDECTOMY       C  DELIVERY ONLY       C  DELIVERY ONLY      repeat c section with incidental cystotomy with repair     C EXCIS PITUITARY,TRANSNASAL/SEPTAL      pituitary tumor removed for diabetes insipidus     C TOTAL ABDOM HYSTERECTOMY      w/ bilateral salpingoophorectomy       SECTION       COLONOSCOPY       ENDOSCOPIC RETROGRADE CHOLANGIOPANCREATOGRAM N/A 2015    Procedure: ENDOSCOPIC RETROGRADE CHOLANGIOPANCREATOGRAM;  Surgeon: Mandeep Park MD;  Location: [facility]     ENDOSCOPIC RETROGRADE CHOLANGIOPANCREATOGRAM N/A 2016    Procedure: COMBINED ENDOSCOPIC RETROGRADE CHOLANGIOPANCREATOGRAPHY, PLACE TUBE/STENT;  Surgeon: Mandeep Park MD;  Location: [facility]     ENDOSCOPIC RETROGRADE CHOLANGIOPANCREATOGRAM N/A 3/17/2016    Procedure: COMBINED ENDOSCOPIC RETROGRADE CHOLANGIOPANCREATOGRAPHY, REMOVE FOREIGN BODY OR STENT/TUBE;  Surgeon: Mandeep Park,  MD;  Location: [facility]     ENDOSCOPIC RETROGRADE CHOLANGIOPANCREATOGRAM N/A 8/2/2016    Procedure: COMBINED ENDOSCOPIC RETROGRADE CHOLANGIOPANCREATOGRAPHY, PLACE TUBE/STENT;  Surgeon: Mandeep Park MD;  Location: [facility]     ENDOSCOPIC RETROGRADE CHOLANGIOPANCREATOGRAM N/A 8/26/2016    Procedure: COMBINED ENDOSCOPIC RETROGRADE CHOLANGIOPANCREATOGRAPHY, REMOVE FOREIGN BODY OR STENT/TUBE;  Surgeon: Mandeep Park MD;  Location: [facility]     ENDOSCOPIC ULTRASOUND UPPER GASTROINTESTINAL TRACT (GI) N/A 10/3/2016    Procedure: ENDOSCOPIC ULTRASOUND, ESOPHAGOSCOPY / UPPER GASTROINTESTINAL TRACT (GI);  Surgeon: Guru Jose Rodas MD;  Location: [facility]     ESOPHAGOSCOPY, GASTROSCOPY, DUODENOSCOPY (EGD), COMBINED N/A 6/24/2015    Procedure: COMBINED ESOPHAGOSCOPY, GASTROSCOPY, DUODENOSCOPY (EGD), REMOVE FOREIGN BODY;  Surgeon: Mandeep Park MD;  [facility]: [facility]     ESOPHAGOSCOPY, GASTROSCOPY, DUODENOSCOPY (EGD), COMBINED N/A 10/25/2015    Procedure: COMBINED ESOPHAGOSCOPY, GASTROSCOPY, DUODENOSCOPY (EGD);  Surgeon: Sammy Amaro MD;  [facility]: [facility]     ESOPHAGOSCOPY, GASTROSCOPY, DUODENOSCOPY (EGD), COMBINED N/A 10/25/2015    Procedure: COMBINED ESOPHAGOSCOPY, GASTROSCOPY, DUODENOSCOPY (EGD), BIOPSY SINGLE OR MULTIPLE;  Surgeon: Sammy Amaro MD;  Location: [facility]     ESOPHAGOSCOPY, GASTROSCOPY, DUODENOSCOPY (EGD), DILATATION, COMBINED       EXCISE LESION TRUNK N/A 4/17/2017    Procedure: EXCISE LESION TRUNK;  Removal of Abdominal Foreign Body;  Surgeon: Nestor Phoenix MD;  [facility]     HC ESOPH/GAS REFLUX TEST W NASAL IMPED >1 HR N/A 11/19/2015    Procedure: ESOPHAGEAL IMPEDENCE FUNCTION TEST WITH 24 HOUR PH GREATER THAN 1 HOUR;  Surgeon: Thiago Apple MD;  Location: [facility]     HC UGI ENDOSCOPY DIAG W BIOPSY  9/17/08     HC UGI ENDOSCOPY DIAG W BIOPSY  9/27/12     HC UGI ENDOSCOPY W ESOPHAGEAL DILATION BALLOON <30MM  9/17/08     HC UGI ENDOSCOPY W EUS N/A  5/5/2015    Procedure: COMBINED ENDOSCOPIC ULTRASOUND, ESOPHAGOSCOPY, GASTROSCOPY, DUODENOSCOPY (EGD);  Surgeon: Wm Dueñas MD;  Location: [facility]     HC WRIST ARTHROSCOP,RELEASE XVERS LIG Bilateral 12/17/08     INJECT TRANSVERSUS ABDOMINIS PLANE (TAP) BLOCK BILATERAL Left 9/22/2016    Procedure: INJECT TRANSVERSUS ABDOMINIS PLANE (TAP) BLOCK BILATERAL;  Surgeon: Dickson Corrigan MD;  [facility]     laparoscopic pineda  1995     LAPAROSCOPIC HERNIORRHAPHY INCISIONAL N/A 8/23/2018    Procedure: LAPAROSCOPIC HERNIORRHAPHY INCISIONAL;  Laparoscopic Incisional Hernia Repair with Symbotex Mesh Implant;  Surgeon: Nestor Phoenix MD;  Location: [facility]     LAPAROSCOPIC PANCREATECTOMY, TRANSPLANT AUTO ISLET CELL N/A 12/28/2016    Procedure: LAPAROSCOPIC PANCREATECTOMY, TRANSPLANT AUTO ISLET CELL;  Surgeon: Nestor Phoenix MD;  Location: [facility]     transphenoidal pituitary resection  1990      Allergies   Allergen Reactions     Apple Anaphylaxis     Corticosteroids Other (See Comments)     All oral, IV and injectable steroids are contraindicated (unless in life threatening situations) in [facility] [employer] transplant recipients. They can cause irreversible loss of islet cell function. Please contact patient's transplant care coordinator ADI Gaffney RN at [phone]/pager [phone] and/or endocrinologist prior to administration.       Depakote [Divalproex Sodium] Other (See Comments)     Chest pain     Zithromax [Azithromycin Dihydrate] Anaphylaxis     Noted in 4/7/08 ER     Darvocet [Propoxyphene N-Apap] Itching     Morphine Nausea and Vomiting and Rash     Nalbuphine Hcl Rash     RASH :nubaine     Zosyn [Piperacillin-Tazobactam In D5w] Rash     Possible allergy, did have a diffuse rash that seemed drug related but could have also been related to soap in the hospital.      Bactrim [Sulfamethoxazole W-Trimethoprim] Other (See Comments) and Nausea and Vomiting     Severely low liver function.     Cats      Compazine  [Prochlorperazine] Other (See Comments)     Twitching. Takes Benedryl and is fine     Dust Mites      Grass      Prednisone Other (See Comments)     Insomnia       Ragweeds      Tape [Adhesive Tape] Blisters     Tramadol      Trees      Zofran [Ondansetron] Other (See Comments)     migraine     Flagyl [Metronidazole] Hives and Rash      Social History     Tobacco Use     Smoking status: Former Smoker     Packs/day: 0.50     Years: 6.00     Pack years: 3.00     Types: Cigarettes     Start date: 1985     Quit date: 1992     Years since quittin.6     Smokeless tobacco: Never Used     Tobacco comment: no 2nd hand   Substance Use Topics     Alcohol use: Not Currently     Alcohol/week: 3.0 - 6.0 standard drinks     Types: 1 - 2 Glasses of wine, 1 - 2 Cans of beer, 1 - 2 Shots of liquor per week     Comment: none since IGG      Wt Readings from Last 1 Encounters:   21 63.5 kg (140 lb)               ROS/MED HX  The complete review of systems is negative other than noted in the HPI or here.  Patient denies recent illness, fever and respiratory infection during past month.  Pt denies steroid use during past year.    ENT/Pulmonary: Comment: 3 pk yr hx, quit     (+) allergic rhinitis, tobacco use, Past use,  (-) asthma and sleep apnea   Neurologic:     (+) migraines,  (-) no seizures and no CVA   Cardiovascular:     (+) -----Previous cardiac testing   Echo: Date: Results:    Stress Test: Date: Results:    ECG Reviewed: Date: 21 Results:  Sinus rhythm  Cannot rule out Anterior infarct , age undetermined  Abnormal ECG   Ventricular rate 66 bpm     Cath: Date: Results:      METS/Exercise Tolerance: 4 - Raking leaves, gardening Comment: Endorses SOB when carrying laundry upstairs. Denies CP. +Fatigue, less stamina 2/2 malnutrition     Hematologic:  - neg hematologic  ROS  (-) history of blood clots and history of blood transfusion   Musculoskeletal:  - neg musculoskeletal ROS     GI/Hepatic: Comment: Hx  hepatic dome lesion/IgG4 pseudotumor and elevated LFT's of unclear etiology    Chronic pancreatitis    Severe gastroparesis    Malnutrition, wt loss    (+) GERD, Asymptomatic on medication, hiatal hernia,     Renal/Genitourinary:  - neg Renal ROS     Endo:     (+) thyroid problem, hypothyroidism,  (-) Type II DM   Psychiatric/Substance Use:  - neg psychiatric ROS     Infectious Disease:  - neg infectious disease ROS     Malignancy:  - neg malignancy ROS     Other:  - neg other ROS            Virtual visit -  No vitals were obtained       Physical Exam  Constitutional: Awake, alert, cooperative, no apparent distress, and appears stated age.  Neurologic: Awake, alert, oriented to name, place and time.   Neuropsychiatric: Calm, cooperative. Normal affect. Answers questions appropriately.    ** Patient's visit was done virtually today.  A full physical exam was not completed.  Please refer to the physical examination documented by the anesthesiologist in the anesthesia record on the day of surgery. **        PRIOR LABS/DIAGNOSTIC STUDIES:   All labs and imaging personally reviewed     EKG 8/7/21   Sinus rhythm  Cannot rule out Anterior infarct , age undetermined  Abnormal ECG   Ventricular rate 66 bpm         MR ABDOMEN MRCP W/O & W CONTRAST  8/26/2021   IMPRESSION:   1.  No acute findings in the abdomen to explain patient's symptoms.   2.  Unchanged right L2 vertebral body hemangioma.          CTA ABDOMEN AND PELVIS 8/24/2021   IMPRESSION: Mesenteric ischemia not demonstrated. Mesenteric arteries   and veins are widely patent. Bowel normal in appearance.         NM GASTRIC EMPTYING, 8/24/2021   IMPRESSION: Delayed gastric emptying     CBC:   Lab Results   Component Value Date    WBC 5.7 08/07/2021    WBC 4.5 07/20/2021    HGB 13.4 08/07/2021    HGB 13.4 07/20/2021    HCT 40.3 08/07/2021    HCT 40.4 07/20/2021     08/07/2021     07/20/2021     BMP:   Lab Results   Component Value Date     08/07/2021      07/20/2021    POTASSIUM 4.1 08/07/2021    POTASSIUM 4.0 07/20/2021    CHLORIDE 104 08/07/2021    CHLORIDE 102 07/20/2021    CO2 27 08/07/2021    CO2 32 07/20/2021    BUN 22 08/07/2021    BUN 12 07/20/2021    CR 0.81 08/07/2021    CR 0.68 07/20/2021     (H) 08/07/2021     (H) 07/20/2021     COAGS:   Lab Results   Component Value Date    PTT 29 01/07/2017    INR 1.13 07/20/2021    FIBR 225 12/28/2016     POC:   Lab Results   Component Value Date    BGM 85 08/24/2018    HCG Negative 12/19/2016     HEPATIC:   Lab Results   Component Value Date    ALBUMIN 4.0 08/07/2021    PROTTOTAL 7.5 08/07/2021    ALT 92 (H) 08/07/2021    AST 42 08/07/2021    ALKPHOS 94 08/07/2021    BILITOTAL 1.0 08/07/2021     OTHER:   Lab Results   Component Value Date    PH 7.49 (H) 12/28/2016    LACT 0.6 (L) 12/30/2016    A1C 5.4 08/05/2021    KASSI 9.4 08/07/2021    PHOS 3.4 07/13/2021    MAG 2.6 (H) 07/13/2021    LIPASE <10 (L) 08/07/2021    AMYLASE 45 01/23/2017    TSH 2.41 09/14/2020    T4 1.13 03/23/2018    CRP 90.0 (H) 12/29/2016    SED 11 10/23/2012       The patient's records and results personally reviewed by this provider.       COVID19 testing scheduled on 9/3/21 @ [place]    ASSESSMENT and PLAN  Dinora Mcghee is a 57 y/o female who presents for pre-operative H&P in preparation for REPLACEMENT, GASTROJEJUNOSTOMY TUBE, PERCUTANEOUS with Mandeep Park MD on 9/7/21 at [facility] for treatment of Adult failure to thrive    Pt has had prior anesthetic.     History of anesthetic complications:  PONV  (responds well to scopolamine patch).    She has the following specific operative considerations:   # YOLY 1/8 = low risk  # VTE risk: 0.26%  # Risk of PONV score = 3.  If > 2, anti-emetic intervention recommended.  # Anesthesia considerations:  Refer to PAC assessment in anesthesia records    # Increased risk of postoperative nausea/vomiting: Recommend use of antiemetic agents in  the perioperative period.        CARDIAC: METS 4 -  Endorses SOB when carrying laundry upstairs. Denies CP. +Fatigue, less stamina 2/2 malnutrition     # RCRI : No serious cardiac risks.  0.4% risk of major adverse cardiac event.      PULMONARY:     # Former smoker, 3 pk yr hx, quit 1992    # Allergic rhinitis    # Denies asthma or inhaler use    GI:     # GERD, asymptomatic on prilosec     # Hx hepatic dome lesion/IgG4 pseudotumor and elevated LFT's of unclear etiology    # Chronic pancreatitis    ENDO: BMI 22    # hypothyroidism    # No DM    HEME:     # Has portacath    NEURO/PSYCH:     # Migraines      Patient is optimized and is acceptable candidate for the proposed procedure. No further diagnostic evaluation is needed.    ** Patient's visit was done virtually today.  A full physical exam was not completed.  Please refer to the physical examination documented by the anesthesiologist in the anesthesia record on the day of surgery. **    Final plan per anesthesiologist on day of surgery.     Arrival time, NPO, shower and medication instructions provided by nursing staff today.  Preparing For Your Surgery handout given.        On the day of service:     Prep time: 15 minutes  Visit time: 23 minutes  Documentation time: 15 minutes  ------------------------------------------  Total time: 53 minutes      Anitra Lr PA-C  Preoperative Assessment Center  [facility]  [facility]  Phone: [phone]  Fax: [phone]

## 2021-09-03 ENCOUNTER — TELEPHONE (OUTPATIENT)
Dept: GASTROENTEROLOGY | Facility: CLINIC | Age: 56
End: 2021-09-03

## 2021-09-03 NOTE — TELEPHONE ENCOUNTER
Returned call. Pt at infusion, will get 2L today as she cannot get her IVF on Monday in advance of procedure Tuesday. Verbal orders given per Dr. Park for extra fluid volume.    ML

## 2021-09-03 NOTE — TELEPHONE ENCOUNTER
[facility] Call Center    Phone Message    May a detailed message be left on voicemail: yes     Reason for Call: Other: Per Kelly would like to know if  or care team has received the order to up the order that was sent to them from 1.5 [facility] to 2 litler. Please call back to Kelly if any questions thank you      Action Taken: Message routed to:  [facility] ([facility] Gastro    Travel Screening: Not Applicable                                                                    .

## 2021-09-07 ENCOUNTER — APPOINTMENT (OUTPATIENT)
Dept: GENERAL RADIOLOGY | Facility: CLINIC | Age: 56
End: 2021-09-07
Attending: INTERNAL MEDICINE
Payer: COMMERCIAL

## 2021-09-07 ENCOUNTER — ANESTHESIA (OUTPATIENT)
Dept: SURGERY | Facility: CLINIC | Age: 56
End: 2021-09-07
Payer: COMMERCIAL

## 2021-09-07 ENCOUNTER — HOSPITAL ENCOUNTER (OUTPATIENT)
Dept: EDUCATION SERVICES | Facility: CLINIC | Age: 56
End: 2021-09-07
Attending: INTERNAL MEDICINE | Admitting: INTERNAL MEDICINE
Payer: COMMERCIAL

## 2021-09-07 ENCOUNTER — HOSPITAL ENCOUNTER (INPATIENT)
Facility: CLINIC | Age: 56
LOS: 4 days | Discharge: HOME | End: 2021-09-12
Attending: INTERNAL MEDICINE | Admitting: PEDIATRICS
Payer: COMMERCIAL

## 2021-09-07 DIAGNOSIS — G89.4 CHRONIC PAIN SYNDROME: Primary | ICD-10-CM

## 2021-09-07 DIAGNOSIS — R62.7 ADULT FAILURE TO THRIVE: ICD-10-CM

## 2021-09-07 LAB
GLUCOSE BLDC GLUCOMTR-MCNC: 81 MG/DL (ref 70–99)
GLUCOSE BLDC GLUCOMTR-MCNC: 83 MG/DL (ref 70–99)
GLUCOSE BLDC GLUCOMTR-MCNC: 86 MG/DL (ref 70–99)

## 2021-09-07 PROCEDURE — 250N000011 HC RX IP 250 OP 636: Performed by: INTERNAL MEDICINE

## 2021-09-07 PROCEDURE — 250N000011 HC RX IP 250 OP 636: Performed by: PHYSICIAN ASSISTANT

## 2021-09-07 PROCEDURE — 360N000082 HC SURGERY LEVEL 2 W/ FLUORO, PER MIN: Performed by: INTERNAL MEDICINE

## 2021-09-07 PROCEDURE — 250N000011 HC RX IP 250 OP 636: Performed by: STUDENT IN AN ORGANIZED HEALTH CARE EDUCATION/TRAINING PROGRAM

## 2021-09-07 PROCEDURE — 258N000003 HC RX IP 258 OP 636: Performed by: PHYSICIAN ASSISTANT

## 2021-09-07 PROCEDURE — 250N000009 HC RX 250: Performed by: INTERNAL MEDICINE

## 2021-09-07 PROCEDURE — 999N000181 XR SURGERY CARM FLUORO GREATER THAN 5 MIN W STILLS: Mod: TC

## 2021-09-07 PROCEDURE — 250N000013 HC RX MED GY IP 250 OP 250 PS 637: Performed by: STUDENT IN AN ORGANIZED HEALTH CARE EDUCATION/TRAINING PROGRAM

## 2021-09-07 PROCEDURE — 370N000017 HC ANESTHESIA TECHNICAL FEE, PER MIN: Performed by: INTERNAL MEDICINE

## 2021-09-07 PROCEDURE — 250N000009 HC RX 250: Performed by: PHYSICIAN ASSISTANT

## 2021-09-07 PROCEDURE — 250N000009 HC RX 250: Performed by: STUDENT IN AN ORGANIZED HEALTH CARE EDUCATION/TRAINING PROGRAM

## 2021-09-07 PROCEDURE — 96374 THER/PROPH/DIAG INJ IV PUSH: CPT

## 2021-09-07 PROCEDURE — 999N000141 HC STATISTIC PRE-PROCEDURE NURSING ASSESSMENT: Performed by: INTERNAL MEDICINE

## 2021-09-07 PROCEDURE — C1894 INTRO/SHEATH, NON-LASER: HCPCS | Performed by: INTERNAL MEDICINE

## 2021-09-07 PROCEDURE — 0DHA4UZ INSERTION OF FEEDING DEVICE INTO JEJUNUM, PERCUTANEOUS ENDOSCOPIC APPROACH: ICD-10-PCS | Performed by: INTERNAL MEDICINE

## 2021-09-07 PROCEDURE — 250N000011 HC RX IP 250 OP 636: Performed by: ANESTHESIOLOGY

## 2021-09-07 PROCEDURE — 710N000010 HC RECOVERY PHASE 1, LEVEL 2, PER MIN: Performed by: INTERNAL MEDICINE

## 2021-09-07 PROCEDURE — 250N000009 HC RX 250: Performed by: ANESTHESIOLOGY

## 2021-09-07 PROCEDURE — G0378 HOSPITAL OBSERVATION PER HR: HCPCS

## 2021-09-07 PROCEDURE — 999N000127 HC STATISTIC PERIPHERAL IV START W US GUIDANCE

## 2021-09-07 PROCEDURE — 99220 PR INITIAL OBSERVATION CARE,LEVEL III: CPT | Performed by: PEDIATRICS

## 2021-09-07 PROCEDURE — 272N000001 HC OR GENERAL SUPPLY STERILE: Performed by: INTERNAL MEDICINE

## 2021-09-07 PROCEDURE — 258N000003 HC RX IP 258 OP 636: Performed by: STUDENT IN AN ORGANIZED HEALTH CARE EDUCATION/TRAINING PROGRAM

## 2021-09-07 PROCEDURE — 250N000013 HC RX MED GY IP 250 OP 250 PS 637: Performed by: PHYSICIAN ASSISTANT

## 2021-09-07 PROCEDURE — 96376 TX/PRO/DX INJ SAME DRUG ADON: CPT

## 2021-09-07 RX ORDER — SODIUM CHLORIDE, SODIUM LACTATE, POTASSIUM CHLORIDE, CALCIUM CHLORIDE 600; 310; 30; 20 MG/100ML; MG/100ML; MG/100ML; MG/100ML
INJECTION, SOLUTION INTRAVENOUS CONTINUOUS PRN
Status: DISCONTINUED | OUTPATIENT
Start: 2021-09-07 | End: 2021-09-07

## 2021-09-07 RX ORDER — FENTANYL CITRATE 50 UG/ML
25 INJECTION, SOLUTION INTRAMUSCULAR; INTRAVENOUS EVERY 5 MIN PRN
Status: DISCONTINUED | OUTPATIENT
Start: 2021-09-07 | End: 2021-09-07

## 2021-09-07 RX ORDER — PANTOPRAZOLE SODIUM 40 MG/1
40 TABLET, DELAYED RELEASE ORAL 2 TIMES DAILY
Status: DISCONTINUED | OUTPATIENT
Start: 2021-09-07 | End: 2021-09-12 | Stop reason: HOSPADM

## 2021-09-07 RX ORDER — ONDANSETRON 2 MG/ML
4 INJECTION INTRAMUSCULAR; INTRAVENOUS
Status: DISCONTINUED | OUTPATIENT
Start: 2021-09-07 | End: 2021-09-07 | Stop reason: HOSPADM

## 2021-09-07 RX ORDER — NALOXONE HYDROCHLORIDE 0.4 MG/ML
0.4 INJECTION, SOLUTION INTRAMUSCULAR; INTRAVENOUS; SUBCUTANEOUS
Status: DISCONTINUED | OUTPATIENT
Start: 2021-09-07 | End: 2021-09-12 | Stop reason: HOSPADM

## 2021-09-07 RX ORDER — ZOLMITRIPTAN 5 MG/1
5 TABLET, FILM COATED ORAL
Status: DISCONTINUED | OUTPATIENT
Start: 2021-09-07 | End: 2021-09-08 | Stop reason: ALTCHOICE

## 2021-09-07 RX ORDER — DIPHENHYDRAMINE HCL 25 MG
25 CAPSULE ORAL EVERY 6 HOURS PRN
Status: DISCONTINUED | OUTPATIENT
Start: 2021-09-07 | End: 2021-09-12 | Stop reason: HOSPADM

## 2021-09-07 RX ORDER — ACETAMINOPHEN 325 MG/1
650 TABLET ORAL EVERY 6 HOURS PRN
Status: DISCONTINUED | OUTPATIENT
Start: 2021-09-07 | End: 2021-09-12 | Stop reason: HOSPADM

## 2021-09-07 RX ORDER — HYDROMORPHONE HCL IN WATER/PF 6 MG/30 ML
0.2 PATIENT CONTROLLED ANALGESIA SYRINGE INTRAVENOUS EVERY 5 MIN PRN
Status: DISCONTINUED | OUTPATIENT
Start: 2021-09-07 | End: 2021-09-07

## 2021-09-07 RX ORDER — HYDRALAZINE HYDROCHLORIDE 20 MG/ML
2.5-5 INJECTION INTRAMUSCULAR; INTRAVENOUS EVERY 10 MIN PRN
Status: DISCONTINUED | OUTPATIENT
Start: 2021-09-07 | End: 2021-09-07

## 2021-09-07 RX ORDER — ONDANSETRON 4 MG/1
4 TABLET, ORALLY DISINTEGRATING ORAL EVERY 6 HOURS PRN
Status: DISCONTINUED | OUTPATIENT
Start: 2021-09-07 | End: 2021-09-07

## 2021-09-07 RX ORDER — SCOLOPAMINE TRANSDERMAL SYSTEM 1 MG/1
1 PATCH, EXTENDED RELEASE TRANSDERMAL
Status: DISCONTINUED | OUTPATIENT
Start: 2021-09-10 | End: 2021-09-12 | Stop reason: HOSPADM

## 2021-09-07 RX ORDER — AMOXICILLIN 250 MG
1-2 CAPSULE ORAL EVERY MORNING
Status: DISCONTINUED | OUTPATIENT
Start: 2021-09-08 | End: 2021-09-12 | Stop reason: HOSPADM

## 2021-09-07 RX ORDER — SODIUM BICARBONATE 325 MG/1
325 TABLET ORAL EVERY 4 HOURS
Status: DISCONTINUED | OUTPATIENT
Start: 2021-09-07 | End: 2021-09-08 | Stop reason: CLARIF

## 2021-09-07 RX ORDER — SCOLOPAMINE TRANSDERMAL SYSTEM 1 MG/1
1 PATCH, EXTENDED RELEASE TRANSDERMAL ONCE
Status: DISCONTINUED | OUTPATIENT
Start: 2021-09-07 | End: 2021-09-07 | Stop reason: HOSPADM

## 2021-09-07 RX ORDER — IOPAMIDOL 755 MG/ML
INJECTION, SOLUTION INTRAVASCULAR PRN
Status: DISCONTINUED | OUTPATIENT
Start: 2021-09-07 | End: 2021-09-07 | Stop reason: HOSPADM

## 2021-09-07 RX ORDER — PROMETHAZINE HYDROCHLORIDE 25 MG/1
25 TABLET ORAL DAILY PRN
Status: DISCONTINUED | OUTPATIENT
Start: 2021-09-07 | End: 2021-09-12 | Stop reason: HOSPADM

## 2021-09-07 RX ORDER — LANOLIN ALCOHOL/MO/W.PET/CERES
6 CREAM (GRAM) TOPICAL
Status: DISCONTINUED | OUTPATIENT
Start: 2021-09-07 | End: 2021-09-12 | Stop reason: HOSPADM

## 2021-09-07 RX ORDER — OXYCODONE HYDROCHLORIDE 5 MG/1
5 TABLET ORAL EVERY 4 HOURS PRN
Status: DISCONTINUED | OUTPATIENT
Start: 2021-09-07 | End: 2021-09-12 | Stop reason: HOSPADM

## 2021-09-07 RX ORDER — ACETAMINOPHEN 325 MG/1
975 TABLET ORAL ONCE
Status: COMPLETED | OUTPATIENT
Start: 2021-09-07 | End: 2021-09-07

## 2021-09-07 RX ORDER — PROPOFOL 10 MG/ML
INJECTION, EMULSION INTRAVENOUS CONTINUOUS PRN
Status: DISCONTINUED | OUTPATIENT
Start: 2021-09-07 | End: 2021-09-07

## 2021-09-07 RX ORDER — ONDANSETRON 2 MG/ML
4 INJECTION INTRAMUSCULAR; INTRAVENOUS EVERY 6 HOURS PRN
Status: DISCONTINUED | OUTPATIENT
Start: 2021-09-07 | End: 2021-09-07

## 2021-09-07 RX ORDER — OXYCODONE HYDROCHLORIDE 5 MG/1
5 TABLET ORAL EVERY 4 HOURS PRN
Status: DISCONTINUED | OUTPATIENT
Start: 2021-09-07 | End: 2021-09-07

## 2021-09-07 RX ORDER — AZELASTINE 1 MG/ML
1 SPRAY, METERED NASAL 2 TIMES DAILY
Status: DISCONTINUED | OUTPATIENT
Start: 2021-09-07 | End: 2021-09-08

## 2021-09-07 RX ORDER — NALOXONE HYDROCHLORIDE 0.4 MG/ML
0.2 INJECTION, SOLUTION INTRAMUSCULAR; INTRAVENOUS; SUBCUTANEOUS
Status: DISCONTINUED | OUTPATIENT
Start: 2021-09-07 | End: 2021-09-12 | Stop reason: HOSPADM

## 2021-09-07 RX ORDER — LABETALOL HYDROCHLORIDE 5 MG/ML
5-10 INJECTION, SOLUTION INTRAVENOUS EVERY 10 MIN PRN
Status: DISCONTINUED | OUTPATIENT
Start: 2021-09-07 | End: 2021-09-07

## 2021-09-07 RX ORDER — SODIUM CHLORIDE, SODIUM LACTATE, POTASSIUM CHLORIDE, CALCIUM CHLORIDE 600; 310; 30; 20 MG/100ML; MG/100ML; MG/100ML; MG/100ML
INJECTION, SOLUTION INTRAVENOUS CONTINUOUS
Status: DISCONTINUED | OUTPATIENT
Start: 2021-09-07 | End: 2021-09-09

## 2021-09-07 RX ORDER — LIDOCAINE 40 MG/G
CREAM TOPICAL
Status: DISCONTINUED | OUTPATIENT
Start: 2021-09-07 | End: 2021-09-07 | Stop reason: HOSPADM

## 2021-09-07 RX ORDER — SODIUM CHLORIDE, SODIUM LACTATE, POTASSIUM CHLORIDE, CALCIUM CHLORIDE 600; 310; 30; 20 MG/100ML; MG/100ML; MG/100ML; MG/100ML
INJECTION, SOLUTION INTRAVENOUS CONTINUOUS
Status: DISCONTINUED | OUTPATIENT
Start: 2021-09-07 | End: 2021-09-07 | Stop reason: HOSPADM

## 2021-09-07 RX ORDER — FAMOTIDINE 20 MG/1
20 TABLET, FILM COATED ORAL EVERY MORNING
Status: DISCONTINUED | OUTPATIENT
Start: 2021-09-08 | End: 2021-09-12 | Stop reason: HOSPADM

## 2021-09-07 RX ORDER — LIDOCAINE HYDROCHLORIDE 20 MG/ML
INJECTION, SOLUTION INFILTRATION; PERINEURAL PRN
Status: DISCONTINUED | OUTPATIENT
Start: 2021-09-07 | End: 2021-09-07

## 2021-09-07 RX ORDER — ACARBOSE 25 MG/1
50 TABLET ORAL
Status: DISCONTINUED | OUTPATIENT
Start: 2021-09-07 | End: 2021-09-08

## 2021-09-07 RX ORDER — FENTANYL CITRATE 50 UG/ML
INJECTION, SOLUTION INTRAMUSCULAR; INTRAVENOUS PRN
Status: DISCONTINUED | OUTPATIENT
Start: 2021-09-07 | End: 2021-09-07

## 2021-09-07 RX ORDER — PROPOFOL 10 MG/ML
INJECTION, EMULSION INTRAVENOUS PRN
Status: DISCONTINUED | OUTPATIENT
Start: 2021-09-07 | End: 2021-09-07

## 2021-09-07 RX ORDER — MEPERIDINE HYDROCHLORIDE 25 MG/ML
12.5 INJECTION INTRAMUSCULAR; INTRAVENOUS; SUBCUTANEOUS
Status: DISCONTINUED | OUTPATIENT
Start: 2021-09-07 | End: 2021-09-07

## 2021-09-07 RX ORDER — FENTANYL CITRATE 50 UG/ML
25-50 INJECTION, SOLUTION INTRAMUSCULAR; INTRAVENOUS EVERY 5 MIN PRN
Status: ACTIVE | OUTPATIENT
Start: 2021-09-07 | End: 2021-09-07

## 2021-09-07 RX ORDER — BUPIVACAINE HYDROCHLORIDE 2.5 MG/ML
INJECTION, SOLUTION EPIDURAL; INFILTRATION; INTRACAUDAL PRN
Status: DISCONTINUED | OUTPATIENT
Start: 2021-09-07 | End: 2021-09-07

## 2021-09-07 RX ORDER — ONDANSETRON 4 MG/1
4 TABLET, ORALLY DISINTEGRATING ORAL EVERY 30 MIN PRN
Status: DISCONTINUED | OUTPATIENT
Start: 2021-09-07 | End: 2021-09-07

## 2021-09-07 RX ORDER — CEFAZOLIN SODIUM 1 G/3ML
INJECTION, POWDER, FOR SOLUTION INTRAMUSCULAR; INTRAVENOUS PRN
Status: DISCONTINUED | OUTPATIENT
Start: 2021-09-07 | End: 2021-09-07

## 2021-09-07 RX ORDER — POLYETHYLENE GLYCOL 3350 17 G/17G
17 POWDER, FOR SOLUTION ORAL EVERY MORNING
Status: DISCONTINUED | OUTPATIENT
Start: 2021-09-08 | End: 2021-09-12 | Stop reason: HOSPADM

## 2021-09-07 RX ORDER — NICOTINE POLACRILEX 4 MG
15-30 LOZENGE BUCCAL
Status: DISCONTINUED | OUTPATIENT
Start: 2021-09-07 | End: 2021-09-12 | Stop reason: HOSPADM

## 2021-09-07 RX ORDER — ONDANSETRON 2 MG/ML
4 INJECTION INTRAMUSCULAR; INTRAVENOUS EVERY 30 MIN PRN
Status: DISCONTINUED | OUTPATIENT
Start: 2021-09-07 | End: 2021-09-07

## 2021-09-07 RX ORDER — SCOLOPAMINE TRANSDERMAL SYSTEM 1 MG/1
1 PATCH, EXTENDED RELEASE TRANSDERMAL
Status: DISCONTINUED | OUTPATIENT
Start: 2021-09-07 | End: 2021-09-07

## 2021-09-07 RX ORDER — LORAZEPAM 0.5 MG/1
0.25 TABLET ORAL EVERY 4 HOURS PRN
Status: DISCONTINUED | OUTPATIENT
Start: 2021-09-07 | End: 2021-09-12 | Stop reason: HOSPADM

## 2021-09-07 RX ORDER — ALBUTEROL SULFATE 0.83 MG/ML
2.5 SOLUTION RESPIRATORY (INHALATION) EVERY 4 HOURS PRN
Status: DISCONTINUED | OUTPATIENT
Start: 2021-09-07 | End: 2021-09-12 | Stop reason: HOSPADM

## 2021-09-07 RX ORDER — HYDROMORPHONE HCL IN WATER/PF 6 MG/30 ML
0.2 PATIENT CONTROLLED ANALGESIA SYRINGE INTRAVENOUS
Status: DISCONTINUED | OUTPATIENT
Start: 2021-09-07 | End: 2021-09-07

## 2021-09-07 RX ORDER — HYDROMORPHONE HCL IN WATER/PF 6 MG/30 ML
0.2 PATIENT CONTROLLED ANALGESIA SYRINGE INTRAVENOUS
Status: DISCONTINUED | OUTPATIENT
Start: 2021-09-07 | End: 2021-09-08

## 2021-09-07 RX ORDER — FLUMAZENIL 0.1 MG/ML
0.2 INJECTION, SOLUTION INTRAVENOUS
Status: ACTIVE | OUTPATIENT
Start: 2021-09-07 | End: 2021-09-08

## 2021-09-07 RX ORDER — MONTELUKAST SODIUM 10 MG/1
10 TABLET ORAL AT BEDTIME
Status: DISCONTINUED | OUTPATIENT
Start: 2021-09-07 | End: 2021-09-12 | Stop reason: HOSPADM

## 2021-09-07 RX ORDER — CETIRIZINE HYDROCHLORIDE 10 MG/1
10 TABLET ORAL DAILY
Status: DISCONTINUED | OUTPATIENT
Start: 2021-09-08 | End: 2021-09-12 | Stop reason: HOSPADM

## 2021-09-07 RX ADMIN — HYDROMORPHONE HYDROCHLORIDE 0.2 MG: 0.2 INJECTION, SOLUTION INTRAMUSCULAR; INTRAVENOUS; SUBCUTANEOUS at 14:32

## 2021-09-07 RX ADMIN — PHENYLEPHRINE HYDROCHLORIDE 100 MCG: 10 INJECTION INTRAVENOUS at 09:31

## 2021-09-07 RX ADMIN — SODIUM CHLORIDE, POTASSIUM CHLORIDE, SODIUM LACTATE AND CALCIUM CHLORIDE: 600; 310; 30; 20 INJECTION, SOLUTION INTRAVENOUS at 08:52

## 2021-09-07 RX ADMIN — HYDROMORPHONE HYDROCHLORIDE 0.2 MG: 0.2 INJECTION, SOLUTION INTRAMUSCULAR; INTRAVENOUS; SUBCUTANEOUS at 13:36

## 2021-09-07 RX ADMIN — SODIUM BICARBONATE 325 MG: 325 TABLET ORAL at 21:34

## 2021-09-07 RX ADMIN — Medication 0.25 MG: at 23:16

## 2021-09-07 RX ADMIN — ACETAMINOPHEN 650 MG: 325 TABLET, FILM COATED ORAL at 22:06

## 2021-09-07 RX ADMIN — PROPOFOL 20 MG: 10 INJECTION, EMULSION INTRAVENOUS at 10:01

## 2021-09-07 RX ADMIN — HYDROMORPHONE HYDROCHLORIDE 0.2 MG: 0.2 INJECTION, SOLUTION INTRAMUSCULAR; INTRAVENOUS; SUBCUTANEOUS at 16:48

## 2021-09-07 RX ADMIN — Medication 10 MG: at 11:19

## 2021-09-07 RX ADMIN — HYDROMORPHONE HYDROCHLORIDE 0.2 MG: 0.2 INJECTION, SOLUTION INTRAMUSCULAR; INTRAVENOUS; SUBCUTANEOUS at 12:32

## 2021-09-07 RX ADMIN — HYDROMORPHONE HYDROCHLORIDE 0.2 MG: 0.2 INJECTION, SOLUTION INTRAMUSCULAR; INTRAVENOUS; SUBCUTANEOUS at 12:22

## 2021-09-07 RX ADMIN — AMITRIPTYLINE HYDROCHLORIDE 60 MG: 10 TABLET, FILM COATED ORAL at 21:35

## 2021-09-07 RX ADMIN — HYDROMORPHONE HYDROCHLORIDE 0.2 MG: 0.2 INJECTION, SOLUTION INTRAMUSCULAR; INTRAVENOUS; SUBCUTANEOUS at 13:08

## 2021-09-07 RX ADMIN — ACETAMINOPHEN 975 MG: 325 TABLET, FILM COATED ORAL at 07:26

## 2021-09-07 RX ADMIN — PROCHLORPERAZINE EDISYLATE 5 MG: 5 INJECTION INTRAMUSCULAR; INTRAVENOUS at 14:34

## 2021-09-07 RX ADMIN — SODIUM CHLORIDE, POTASSIUM CHLORIDE, SODIUM LACTATE AND CALCIUM CHLORIDE: 600; 310; 30; 20 INJECTION, SOLUTION INTRAVENOUS at 11:15

## 2021-09-07 RX ADMIN — PROPOFOL 90 MG: 10 INJECTION, EMULSION INTRAVENOUS at 08:58

## 2021-09-07 RX ADMIN — SODIUM CHLORIDE, POTASSIUM CHLORIDE, SODIUM LACTATE AND CALCIUM CHLORIDE: 600; 310; 30; 20 INJECTION, SOLUTION INTRAVENOUS at 17:57

## 2021-09-07 RX ADMIN — BUPIVACAINE HYDROCHLORIDE 40 ML: 2.5 INJECTION, SOLUTION EPIDURAL; INFILTRATION; INTRACAUDAL; PERINEURAL at 09:08

## 2021-09-07 RX ADMIN — Medication 10 MG: at 12:04

## 2021-09-07 RX ADMIN — PHENYLEPHRINE HYDROCHLORIDE 50 MCG: 10 INJECTION INTRAVENOUS at 09:16

## 2021-09-07 RX ADMIN — PROCHLORPERAZINE EDISYLATE 5 MG: 5 INJECTION INTRAMUSCULAR; INTRAVENOUS at 11:45

## 2021-09-07 RX ADMIN — HYDROMORPHONE HYDROCHLORIDE 0.2 MG: 0.2 INJECTION, SOLUTION INTRAMUSCULAR; INTRAVENOUS; SUBCUTANEOUS at 13:30

## 2021-09-07 RX ADMIN — HYDROMORPHONE HYDROCHLORIDE 0.2 MG: 0.2 INJECTION, SOLUTION INTRAMUSCULAR; INTRAVENOUS; SUBCUTANEOUS at 11:30

## 2021-09-07 RX ADMIN — FENTANYL CITRATE 25 MCG: 50 INJECTION, SOLUTION INTRAMUSCULAR; INTRAVENOUS at 10:54

## 2021-09-07 RX ADMIN — FENTANYL CITRATE 100 MCG: 50 INJECTION, SOLUTION INTRAMUSCULAR; INTRAVENOUS at 08:55

## 2021-09-07 RX ADMIN — PHENYLEPHRINE HYDROCHLORIDE 100 MCG: 10 INJECTION INTRAVENOUS at 09:41

## 2021-09-07 RX ADMIN — PROPOFOL 200 MCG/KG/MIN: 10 INJECTION, EMULSION INTRAVENOUS at 08:55

## 2021-09-07 RX ADMIN — SCOPALAMINE 1 PATCH: 1 PATCH, EXTENDED RELEASE TRANSDERMAL at 16:49

## 2021-09-07 RX ADMIN — LIDOCAINE HYDROCHLORIDE 90 MG: 20 INJECTION, SOLUTION INFILTRATION; PERINEURAL at 08:55

## 2021-09-07 RX ADMIN — MONTELUKAST 10 MG: 10 TABLET, FILM COATED ORAL at 21:34

## 2021-09-07 RX ADMIN — HYDROMORPHONE HYDROCHLORIDE 0.2 MG: 0.2 INJECTION, SOLUTION INTRAMUSCULAR; INTRAVENOUS; SUBCUTANEOUS at 11:12

## 2021-09-07 RX ADMIN — CEFAZOLIN 2 G: 1 INJECTION, POWDER, FOR SOLUTION INTRAMUSCULAR; INTRAVENOUS at 08:57

## 2021-09-07 RX ADMIN — FENTANYL CITRATE 25 MCG: 50 INJECTION, SOLUTION INTRAMUSCULAR; INTRAVENOUS at 10:49

## 2021-09-07 RX ADMIN — HYDROMORPHONE HYDROCHLORIDE 0.2 MG: 0.2 INJECTION, SOLUTION INTRAMUSCULAR; INTRAVENOUS; SUBCUTANEOUS at 19:44

## 2021-09-07 RX ADMIN — DEXMEDETOMIDINE 40 MCG: 100 INJECTION, SOLUTION, CONCENTRATE INTRAVENOUS at 09:08

## 2021-09-07 RX ADMIN — SUGAMMADEX 150 MG: 100 INJECTION, SOLUTION INTRAVENOUS at 10:20

## 2021-09-07 RX ADMIN — Medication 0.25 MG: at 18:37

## 2021-09-07 RX ADMIN — PHENYLEPHRINE HYDROCHLORIDE 150 MCG: 10 INJECTION INTRAVENOUS at 09:36

## 2021-09-07 RX ADMIN — HYDROMORPHONE HYDROCHLORIDE 0.2 MG: 0.2 INJECTION, SOLUTION INTRAMUSCULAR; INTRAVENOUS; SUBCUTANEOUS at 11:00

## 2021-09-07 RX ADMIN — ROCURONIUM BROMIDE 70 MG: 10 INJECTION INTRAVENOUS at 08:59

## 2021-09-07 ASSESSMENT — MIFFLIN-ST. JEOR: SCORE: 1242.63

## 2021-09-07 ASSESSMENT — PAIN DESCRIPTION - DESCRIPTORS: DESCRIPTORS: ACHING;CONSTANT

## 2021-09-07 NOTE — OR NURSING
Spoke with Dr. Triplett regarding pt's nausea and reveiced VORB for 5mg compazine IV.  Pt has allergy but tolerates with benadryl.  Tolerated her dose given earlier today.

## 2021-09-07 NOTE — BRIEF OP NOTE
[facility] Brief Operative Note    Pre-operative diagnosis: Adult failure to thrive [R62.7]   Post-operative diagnosis As prior    Procedure: Procedure(s):  GASTROJEJUNOSTOMY TUBE PLACEMENT, PERCUTANEOUS, WITH GASTROPEXY   Surgeon(s): Surgeon(s) and Role:     * Mandeep Park MD - Primary     * Judit Paz MD - Fellow - Assisting   Estimated blood loss: * No values recorded between 9/7/2021  9:25 AM and 9/7/2021 10:15 AM *        Findings:        56-year-old female who is 5 years post TPI AT and recently diagnosed with gastroparesis resulting intractable abdominal pain, bloating and weight loss. She presents today for planned G-J tube placement    Findings:  - Normal stomach, evidence of duodenojejunostomy  - G-J tube successfully placed with distal end in the mid jejunum       Plan:  - [facility].   - Avoid NSAIDs, antithrombotics for at least 3 days   - Abdominal examinations at 2 and 4h; without signs of complicaton such as bleeding or increasing abdominal pain tube feeds may begin as per dietitian direction.   - Please follow the post-PEG recommendations including: Nutrition consult for formula and volume, change dressing once daily.   - The findings and recommendations were discussed with the patient and their family.

## 2021-09-07 NOTE — PROGRESS NOTES
CLINICAL NUTRITION SERVICES - ASSESSMENT NOTE    Consult received for TF orders.  Patient is currently under observation status and was seen recently by outpatient RD with TF recommendations and education discussed.  Please refer to note from 8/23/21 for nutrition background information.  TF orders entered per below recommendations.      Nutrition Prescription    Malnutrition Status:    Severe Malnutrition in the content of acute on chronic illness (per outpatient RD note)    Recommendations already ordered by Registered Dietitian (RD):  Vital 1.5 Abdulkadir (semi-elemental, in substitute for Peptamen 1.5 as recommended by outpatient RD) @ goal of  45ml/hr  (1080ml/day)  will provide: 1620 kcals (26 kcal/kg), 72 g PRO (1.2g/kg), 825 ml free H20, 201 g CHO, and 6 g fiber daily.  -Start at 15 ml/hr and advance by 15 ml/hr q 8 hours to goal rate of 45 ml/hr.   A) Sodium Bicarb tablet (325 mg), 1 tablet q 4 hrs via Jtube. Administration Instructions: Crush 1 tablet and mix into 15 ml of warm water and use this solution to mix with Creon pancreatic enzymes. DO NOT administer directly into Jtube (to be mixed into TF formula with Creon enzyme - see Creon enzyme order)   B) Creon 12, 1-2 capsules q 4 hrs via Jtube. Administration Instructions:   --If TF rate is running @ 10-30 ml/hr, administer 1 capsule q 4 hrs;   --If TF rate is running @ 35-45 ml/hr, increase to 2 capsules q 4 hrs.    **Open capsule and empty contents into 15 ml sodium bicarb solution (see sodium bicarb order), let dissolve for about 20-30 minutes and then add this solution to the amount of TF formula hung in TF bag every 4 hrs (i.e., once TF @ goal infusion 45 ml/hr will mix 1 capsules into 180 ml of TF formula every 4 hrs).   *Note: this enzyme regimen with TF @ goal infusion will provide approx 2400 units of lipase/gram of total Fat daily and approp dosing initially for pancreatic insufficiency with more elemental TF formula.     Future/Additional  Recommendations:  Consider checking to see if insurance will cover relizorb cartridge.  If so, this could be used in place of enzymes.  Once TF at goal rate, patient can use Creon 24 - 1 capsule q 4 hours instead of the Creon 12 - 2 capsules q 4 hours.      Free water: At least 60 ml free water 6 times per day.  If no oral intake consumed, recommend at least 800 ml daily via FT to maintain hydration.      Once TF well tolerated at continuous rate of 45 ml/hr, consider attempting advancement to cycled regimen per the following schedule:  Night 1: Increase to 65 ml/hr x 16 hours   Night 2: Increase to 75 ml/hr x 14 hours  Night 3: Increase to 90 ml/hr x 12 hours         Dosing Weight: 62 kg (actual wt)    ASSESSED NUTRITION NEEDS  Estimated Energy Needs: 2725-8197 kcals/day (25 - 30 kcals/kg)  Justification: Maintenance  Estimated Protein Needs: 74-93 grams protein/day (1.2 - 1.5 grams of pro/kg)  Justification: Repletion  Estimated Fluid Needs: 3356-3692 mL/day (25 - 30 mL/kg)   Justification: Maintenance    Provided TF recommendations in discharge instructions as well.      Antoinette Larios MS, RD, LD  Pager [phone]

## 2021-09-07 NOTE — CONSULTS
Bilateral rectus sheath block completed per request of surgeon in OR. Please see procedure note for details. Consult complete.     Remington Gao,

## 2021-09-07 NOTE — PROGRESS NOTES
Pt arrived on unit at 1530 from PACU  G/J tube placement. 2 RN skin assessment completed.VSS. A & O X 4 but drowsy. Arouse to voice.  G/J dressing site C D & I . LS clear. +BS. C/O pain around G site.  IV Dilaudid X 1 with good relief.  MIV @ 100.  Pt denies nausea. Taking in ice chips. Pt spouse at bedside. Will continue to monitor pain, G/J site and vitals and notify provider as needed.

## 2021-09-07 NOTE — OR NURSING
Verbal sign out received from [facility] Dr. Triplett.  Will enter in chart when able.

## 2021-09-07 NOTE — DISCHARGE INSTRUCTIONS
"Tube Feeding: Vital 1.5 (or can use Peptamen 1.5) @ 45 ml/hr continuous via J-tube  When TF running continuous, attach 1 relizorb cartridge every 12 hours.      Water flushes: At least 60 ml free water 6 times per day.  If no oral intake consumed, recommend at least 800 ml daily via FT to maintain hydration.     Once TF well tolerated at continuous rate of 45 ml/hr for at least 24 hours, consider attempting advancement to cycled regimen per the following schedule:  Night 1: Increase to 65 ml/hr x 16 hours (change relizorb cartridge every 8 hours)  Night 2: Increase to 75 ml/hr x 14 hours (change relizorb cartridge every 7 hours)  Night 3: Increase to 90 ml/hr x 12 hours  (charge relizorb cartridge every 6 hours or attach both cartridges at the start of the cycle \"double up\" on the cartridges)                   "

## 2021-09-07 NOTE — ANESTHESIA PROCEDURE NOTES
Airway       Patient location during procedure: OR       Procedure Start/Stop Times: 9/7/2021 9:03 AM  Staff -        Anesthesiologist:  Sarabjit Triplett MD       Resident/Fellow: Scott Shepherd MD       Performed By: anesthesiologist and resident  Consent for Airway        Urgency: elective  Indications and Patient Condition       Indications for airway management: andrez-procedural       Induction type:RSI       Mask difficulty assessment: 1 - vent by mask    Final Airway Details       Final airway type: endotracheal airway       Successful airway: Diagnostic laryngoscopy only  Endotracheal Airway Details        Cuffed: yes       Successful intubation technique: direct laryngoscopy       DL Blade Type: MAC 3       Grade View of Cords: 1       Adjucts: stylet       Position: Right       Measured from: lips       Secured at (cm): 22    Post intubation assessment        Placement verified by: capnometry, equal breath sounds and chest rise        Number of attempts at approach: 1       Secured with: pink tape       Ease of procedure: easy       Dentition: Intact

## 2021-09-07 NOTE — CONSULTS
09/07/21 1443 Chanel Turk, RN     Patient seen alone in PACU while waiting for a bed on 7A. States she had a g-tube and used an Enteralite  feeding pump 5 yrs ago.  Reviewed flushing tube, anchoring and site cares. I will see her 9/8 to review use of Enteralite feeding pump with spouse present and review information we discussed today.   Literature given: Handwashing and Skin Care, Using the Enteralite Infinity Pump for Tube Feeding and Caring for Your Tube at Home.

## 2021-09-07 NOTE — H&P
[facility]    History and Physical - Hospitalist Service, Gold 6        Date of Admission:  9/7/2021    Assessment & Plan      Dinora Mcghee is a 56 year old female with PMHx hypothyroidism, DJD, GERD, hx of pituitary adenoma s/p resection, IgG4 pseudotumor of liver, chronic pancreatitis s/p TPAIT, chronic N/V recently diagnosed with gastroparesis with PEG placement admitted on 9/7/2021, following replacement of percutaneous GJ tube for observation.    # S/p percutaneous GJ tube placement   # Gastroparesis - Patient follows with GI for chronic abdominal pain, nausea, vomiting, and weight loss. Recent gastric emptying showing gastroparesis on 8/24. Please see prior GI outpatient note 8/9/2021. Patient receives IV infusion therapy via port. Had a G-tube, and presents for exchange to GJ, which was successfully placed with the distal end in the mid jejunum. Abdominal exam >4 hours since procedure benign.   - Clear liquid diet   - Avoid NSAIDs, anti-thrombotic for at least 3 days   - Nutrition consult for formula and volume and to start tube feeds  - Change dressing once daily   - Pain management: tylenol 650 mg Q6H PRN, Oxycodone 5 mg Q4H PRN, IV dilaudid 0.2 mg Q2H PRN.   - Continue on capnography while on pain medications   - Continue compazine 5 mg Q6H PRN, promethazine 25 mg daily PRN, scopolamine patch. Trial ativan 0.25 mg Q4H PRN nausea   - CMP and CBC in AM  - Outpatient follow up with Dr. Park 2 weeks after GJ, for discussion of GPOEM and domperidone options     # Chronic pancreatitis s/p TPAIT  - Continue PTA Creon 24, 3-4 capsules TIDAC and with snacks    # Hx of IgG4 pseudotumor - Work up with elevated LFTs with mildly elevated IgG4. Liver lesion previously biopsied with noticeable proliferation and acute and chronic inflammation and fibrosis of liver with focally increased IgG4-positive plasma cells consistent with IgG4 pseudotumor. Followed by hepatology.    - Outpatient follow up with GI     # Hypoglycemia - Continue PTA Acarbose 50 mg TID     # Chronic constipation -Continue PTA motegrity 2 mg daily, Miralax daily,  Senna-colace 1-2 tabs BID,     # GERD - Continue famotidine 20 mg BID, omeprazole 40 mg BID     # RAD, allergic rhinitis  - Azelastine 0.1% nasal spray, 1 spray BID, Cetrizine 10 mg daily, benadryl 25 mg Q6H PRN, and montelukast 10 mg daily.     # Hypothyroidism - TSH 2.41 on 9/14/2020. Continue PTA Levothyroxine 137 mcg daily     # Hx of migraine HA - Continue PTA Amitriptyline 60 mg QHS and Zolmitriptan 5 mg as needed at onset of headache        Diet:  ADAT  DVT Prophylaxis: Pneumatic Compression Devices  Piedra Catheter: Not present  Central Lines: None  Code Status:   FULL CODE     Clinically Significant Risk Factors Present on Admission                   Disposition Plan   Expected discharge: tomorrow recommended to prior living arrangement once adequate pain management/ tolerating PO medications and hemoglobin stable.     The patient's care was discussed with the Attending Physician, Dr. Levon Nair, Bedside Nurse and Patient.    MORENO Mac [facility]  Securely message with the Vocera Web Console (learn more here)  Text page via codetag Paging/Directory  Please see sign in/sign out for up to date coverage information    ______________________________________________________________________    Chief Complaint   Nausea, abdominal pain, weight loss    History is obtained from the patient    History of Present Illness   Dinora Mcghee is a 56 year old female with PMHx hypothyroidism, DJD, GERD, hx of pituitary adenoma s/p resection, IgG4 pseudotumor of liver, chronic pancreatitis s/p TPAIT, chronic N/V recently diagnosed with gastroparesis with PEG placement admitted on 9/7/2021, following replacement of percutaneous GJ tube for observation.    Patient presented for plan replacement of PEG with  percutaneous GJ for N/V and abdominal pain (please see GI outpatient note on 21 for details), and recently diagnosed with gastroparesis on gastric emptying. Currently patient reporting pain 8/10 at site of PEGJ, and nausea. Denies any vomiting. Denies any F/C, CP, SOB, urinary symptoms, diarrhea, or constipation. Reports regular BM with current bowel regimen.     Review of Systems    The 10 point Review of Systems is negative other than noted in the HPI or here.     Past Medical History    I have reviewed this patient's medical history and updated it with pertinent information if needed.   Past Medical History:   Diagnosis Date     Allergic rhinitis, cause unspecified      Allergy to other foods     strawberries, apples, celeries, alice, watermelon     Arthritis     left knee     Choledocholithiasis     long after cholecystectomy     Chronic abdominal pain      Chronic constipation      Chronic nausea      Chronic pancreatitis (H)      Degeneration of lumbar or lumbosacral intervertebral disc      Esophageal reflux     w/ hiatal hernia     Gastroparesis      Hiatal hernia      History of pituitary adenoma     s/p resection     Hypothyroidism      Migraines      Mild hyperlipidemia      On tube feeding diet     presence of GJ tube     Pancreatic disease     PD stricture, 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  Past Surgical History:   Procedure Laterality Date     ABDOMEN SURGERY      c sections: 93, 96, 98. endometriosis growth     APPENDECTOMY       C  DELIVERY ONLY       C  DELIVERY ONLY      repeat c section with incidental cystotomy with repair     C EXCIS PITUITARY,TRANSNASAL/SEPTAL  1980    pituitary tumor  removed for diabetes insipidus     C TOTAL ABDOM HYSTERECTOMY      w/ bilateral salpingoophorectomy       SECTION       COLONOSCOPY       ENDOSCOPIC RETROGRADE CHOLANGIOPANCREATOGRAM N/A 2015    Procedure: ENDOSCOPIC RETROGRADE CHOLANGIOPANCREATOGRAM;  Surgeon: Mandeep Park MD;  [facility]: [facility]     ENDOSCOPIC RETROGRADE CHOLANGIOPANCREATOGRAM N/A 2016    Procedure: COMBINED ENDOSCOPIC RETROGRADE CHOLANGIOPANCREATOGRAPHY, PLACE TUBE/STENT;  Surgeon: Mandeep Park MD;  [facility]     ENDOSCOPIC RETROGRADE CHOLANGIOPANCREATOGRAM N/A 3/17/2016    Procedure: COMBINED ENDOSCOPIC RETROGRADE CHOLANGIOPANCREATOGRAPHY, REMOVE FOREIGN BODY OR STENT/TUBE;  Surgeon: Mandeep Park MD;  [facility]     ENDOSCOPIC RETROGRADE CHOLANGIOPANCREATOGRAM N/A 2016    Procedure: COMBINED ENDOSCOPIC RETROGRADE CHOLANGIOPANCREATOGRAPHY, PLACE TUBE/STENT;  Surgeon: Mandeep Park MD;  [facility]     ENDOSCOPIC RETROGRADE CHOLANGIOPANCREATOGRAM N/A 2016    Procedure: COMBINED ENDOSCOPIC RETROGRADE CHOLANGIOPANCREATOGRAPHY, REMOVE FOREIGN BODY OR STENT/TUBE;  Surgeon: Mandeep Park MD;  [facility]     ENDOSCOPIC ULTRASOUND UPPER GASTROINTESTINAL TRACT (GI) N/A 10/3/2016    Procedure: ENDOSCOPIC ULTRASOUND, ESOPHAGOSCOPY / UPPER GASTROINTESTINAL TRACT (GI);  Surgeon: Guru Jose Rodas MD;  [facility]     ESOPHAGOSCOPY, GASTROSCOPY, DUODENOSCOPY (EGD), COMBINED N/A 2015    Procedure: COMBINED ESOPHAGOSCOPY, GASTROSCOPY, DUODENOSCOPY (EGD), REMOVE FOREIGN BODY;  Surgeon: Mandeep Park MD;  [facility]     ESOPHAGOSCOPY, GASTROSCOPY, DUODENOSCOPY (EGD), COMBINED N/A 10/25/2015    Procedure: COMBINED ESOPHAGOSCOPY, GASTROSCOPY, DUODENOSCOPY (EGD);  Surgeon: Sammy Amaro MD;  [facility]     ESOPHAGOSCOPY, GASTROSCOPY, DUODENOSCOPY (EGD), COMBINED N/A 10/25/2015    Procedure: COMBINED  ESOPHAGOSCOPY, GASTROSCOPY, DUODENOSCOPY (EGD), BIOPSY SINGLE OR MULTIPLE;  Surgeon: Sammy Amaro MD;  [facility]     ESOPHAGOSCOPY, GASTROSCOPY, DUODENOSCOPY (EGD), DILATATION, COMBINED       EXCISE LESION TRUNK N/A 4/17/2017    Procedure: EXCISE LESION TRUNK;  Removal of Abdominal Foreign Body;  Surgeon: Nestor Phoenix MD;  [facility]     HC ESOPH/GAS REFLUX TEST W NASAL IMPED >1 HR N/A 11/19/2015    Procedure: ESOPHAGEAL IMPEDENCE FUNCTION TEST WITH 24 HOUR PH GREATER THAN 1 HOUR;  Surgeon: Thiago Apple MD;  Location: [facility]     HC UGI ENDOSCOPY DIAG W BIOPSY  9/17/08     HC UGI ENDOSCOPY DIAG W BIOPSY  9/27/12     HC UGI ENDOSCOPY W ESOPHAGEAL DILATION BALLOON <30MM  9/17/08     HC UGI ENDOSCOPY W EUS N/A 5/5/2015    Procedure: COMBINED ENDOSCOPIC ULTRASOUND, ESOPHAGOSCOPY, GASTROSCOPY, DUODENOSCOPY (EGD);  Surgeon: Wm Dueñas MD;  Location: [facility]     HC WRIST ARTHROSCOP,RELEASE XVERS LIG Bilateral 12/17/08     INJECT TRANSVERSUS ABDOMINIS PLANE (TAP) BLOCK BILATERAL Left 9/22/2016    Procedure: INJECT TRANSVERSUS ABDOMINIS PLANE (TAP) BLOCK BILATERAL;  Surgeon: Dickson Corrigan MD;  [facility]     laparoscopic pineda  1995     LAPAROSCOPIC HERNIORRHAPHY INCISIONAL N/A 8/23/2018    Procedure: LAPAROSCOPIC HERNIORRHAPHY INCISIONAL;  Laparoscopic Incisional Hernia Repair with Symbotex Mesh Implant;  Surgeon: Nestor Phoenix MD;  [facility]     LAPAROSCOPIC PANCREATECTOMY, TRANSPLANT AUTO ISLET CELL N/A 12/28/2016    Procedure: LAPAROSCOPIC PANCREATECTOMY, TRANSPLANT AUTO ISLET CELL;  Surgeon: Nestor Phoenix MD;  Location[facility]     transphenoidal pituitary resection  1990       Social History   I have reviewed this patient's social history and updated it with pertinent information if needed.  Social History     Tobacco Use     Smoking status: Former Smoker     Packs/day: 0.50     Years: 6.00     Pack years: 3.00     Types: Cigarettes     Start date: 9/1/1985     Quit  date: 1992     Years since quittin.7     Smokeless tobacco: Never Used     Tobacco comment: no 2nd hand   Substance Use Topics     Alcohol use: Not Currently     Alcohol/week: 3.0 - 6.0 standard drinks     Types: 1 - 2 Glasses of wine, 1 - 2 Cans of beer, 1 - 2 Shots of liquor per week     Comment: none since IGG     Drug use: No       Family History   I have reviewed this patient's family history and updated it with pertinent information if needed.  Family History   Problem Relation Age of Onset     Eye Disorder Father         cataract, detached retina     Myocardial Infarction Father 60     Lipids Father      Cerebrovascular Disease Father      Depression Father      Substance Abuse Father      Anesthesia Reaction Father         stroke right after surgery     Cataracts Father      Osteoarthritis Father      Ulcerative Colitis Father      Lipids Mother      Hypertension Mother      Thyroid Disease Mother      Depression Mother      Angina Mother      GERD Mother      Skin Cancer Mother      Eye Disorder Son         ptosis     Eye Disorder Paternal Grandmother         cataract     Eye Disorder Paternal Grandfather         cataract     Diabetes Paternal Grandfather      Eye Disorder Maternal Grandmother         cataract     Thyroid Disease Maternal Grandmother      Coronary Artery Disease Sister         angioplasty     GERD Sister      Substance Abuse Sister      Depression Sister      Depression Son      Anxiety Disorder Son      Thyroid Disease Sister      Diabetes Maternal Grandfather      Depression Nephew      Anxiety Disorder Nephew      Thyroid Disease Nephew      Diabetes Type 2  Cousin         paternal cousin     Heart Disease Paternal Aunt      Diabetes Paternal Aunt      Diabetes Paternal Uncle      Heart Disease Paternal Uncle        Prior to Admission Medications   Prior to Admission Medications   Prescriptions Last Dose Informant Patient Reported? Taking?   Continuous Blood Gluc Sensor  (FREESTYLE LISA 2 SENSOR) [facility] [place], [place]   No No   Si each every 14 days   FOLIC ACID PO 2021 at 0800  Yes Yes   Sig: Take 1 mg by mouth every morning    Prucalopride Succinate (MOTEGRITY) 2 MG TABS 2021 at 0500  No Yes   Sig: Take 2 mg by mouth daily   Patient taking differently: Take 2 mg by mouth every morning    Sharps Container (BD SHARPS ) MISC   No No   Si Container as needed   ZOLMitriptan (ZOMIG) 5 MG tablet Unknown at Unknown time  No Yes   Sig: Take 1 tablet (5 mg) by mouth at onset of headache for migraine   acarbose (PRECOSE) 50 MG tablet 2021 at 2000  No Yes   Sig: Start with 1 pill (50 mg) per day with each meal but can go up to 2 pills (100 mg) per day if needed.   Patient taking differently: 3 times daily (with meals) Start with 1 pill (50 mg) per day with each meal but can go up to 2 pills (100 mg) per day if needed.   amitriptyline (ELAVIL) 10 MG tablet 2021 at 2000  No Yes   Sig: Take 5 tablets (50 mg) by mouth At Bedtime Take 40 mg at bedtime   Patient taking differently: Take 60 mg by mouth At Bedtime    amylase-lipase-protease (CREON 24) 95619-44043 units CPEP per EC capsule 2021 at 2000  No Yes   Sig: Take 3-4 capsules by mouth with meals and 1-2 with snacks. Maximum 15 capsules per day.   Patient taking differently: 3 times daily (with meals) Take 3-4 capsules by mouth with meals and 1-2 with snacks. Maximum 15 capsules per day.   azelastine (ASTELIN) 0.1 % nasal spray 2021 at 0500  No Yes   Sig: Spray 1 spray into both nostrils 2 times daily   blood glucose (PAT CONTOUR NEXT) test strip   No No   Sig: Use to test blood sugar 6 times daily or as directed.   blood glucose monitoring (PAT MICROLET) lancets   No No   Sig: Use to test blood sugar 6-8 times daily or as directed.   cetirizine (ZYRTEC) 10 MG tablet 2021 at 0500  No Yes   Sig: Take 1 tablet (10 mg) by mouth daily   Patient taking differently: Take 10 mg by mouth every morning     diphenhydrAMINE (BENADRYL ALLERGY) 25 MG capsule 9/7/2021 at 0500  No Yes   Sig: Take 1 capsule (25 mg) by mouth every 6 hours as needed for itching or allergies   estradiol (VAGIFEM) 10 MCG TABS vaginal tablet   No No   Sig: Place 1 tablet (10 mcg) vaginally twice a week   famotidine (PEPCID) 20 MG tablet 9/6/2021 at 2000  No Yes   Sig: Take 1 tablet (20 mg) by mouth 2 times daily   Patient taking differently: Take 20 mg by mouth every morning    glucose 40 % GEL gel   No No   Sig: Take 15-30 g by mouth every 15 minutes as needed for low blood sugar   ibuprofen (ADVIL/MOTRIN) 400 MG tablet Past Week at Unknown time  No Yes   Sig: Take 1 tablet (400 mg) by mouth every 6 hours as needed for moderate pain   Patient taking differently: Take 400 mg by mouth every 6 hours as needed for moderate pain    levothyroxine (SYNTHROID) 137 MCG tablet 9/7/2021 at 0500  No Yes   Sig: Take 1 tablet (137 mcg) by mouth daily   Patient taking differently: Take 137 mcg by mouth every morning    montelukast (SINGULAIR) 10 MG tablet 9/6/2021 at 2000  No Yes   Sig: TAKE 1 TABLET(10 MG) BY MOUTH AT BEDTIME   Patient taking differently: At Bedtime TAKE 1 TABLET(10 MG) BY MOUTH AT BEDTIME   multivitamin CF formula (CHOICEFUL) capsule 9/6/2021 at 0800  No Yes   Sig: Take 1 tablet by mouth daily   Patient taking differently: Take 1 tablet by mouth every morning    omeprazole (PRILOSEC) 40 MG DR capsule 9/7/2021 at 0500  No Yes   Sig: Take 1 capsule (40 mg) by mouth 2 times daily   Patient taking differently: Take 40 mg by mouth every evening    order for DME   No No   Sig: Equipment being ordered:Cefaly   polyethylene glycol (MIRALAX) 17 GM/Dose powder 9/6/2021 at 200  No Yes   Sig: Take 17 g (1 capful) by mouth daily   Patient taking differently: Take 1 capful by mouth every morning    prochlorperazine (COMPAZINE) 5 MG tablet 9/7/2021 at 0500  No Yes   Sig: Take 1 tablet (5 mg) by mouth every 6 hours as needed Take two 5 mg tablets by mouth  every six hours as needed for nausea.   promethazine (PHENERGAN) 25 MG tablet Past Week at Unknown time  No Yes   Sig: Take 1 tablet (25 mg) by mouth daily as needed   scopolamine (TRANSDERM) 1 MG/3DAYS 72 hr patch 9/7/2021 at 0500  No Yes   Sig: Place 1 patch onto the skin every 72 hours   senna-docusate (SENOKOT-S;PERICOLACE) 8.6-50 MG per tablet Past Week at Unknown time  No Yes   Sig: Take 1-2 tablets by mouth 2 times daily   Patient taking differently: Take 1-2 tablets by mouth every morning       Facility-Administered Medications: None     Allergies   Allergies   Allergen Reactions     Apple Anaphylaxis     Corticosteroids Other (See Comments)     All oral, IV and injectable steroids are contraindicated (unless in life threatening situations) in Islet Auto transplant recipients. They can cause irreversible loss of islet cell function. Please contact patient's transplant care coordinator ADI Gaffney RN at [phone]/pager [phone] and/or endocrinologist prior to administration.       Depakote [Divalproex Sodium] Other (See Comments)     Chest pain     Zithromax [Azithromycin Dihydrate] Anaphylaxis     Noted in 4/7/08 ER     Darvocet [Propoxyphene N-Apap] Itching     Morphine Nausea and Vomiting and Rash     Nalbuphine Hcl Rash     RASH :nubaine     Zosyn [Piperacillin-Tazobactam In D5w] Rash     Possible allergy, did have a diffuse rash that seemed drug related but could have also been related to soap in the hospital.      Bactrim [Sulfamethoxazole W-Trimethoprim] Other (See Comments) and Nausea and Vomiting     Severely low liver function.     Cats      Compazine [Prochlorperazine] Other (See Comments)     Twitching. Takes Benedryl and is fine     Dust Mites      Grass      Prednisone Other (See Comments)     Insomnia       Ragweeds      Tape [Adhesive Tape] Blisters     Tramadol      Trees      Zofran [Ondansetron] Other (See Comments)     migraine     Flagyl [Metronidazole] Hives and Rash        Physical Exam   Vital Signs: Temp: 98.2  F (36.8  C) Temp src: Oral BP: 102/58 Pulse: 91   Resp: 16 SpO2: 97 % O2 Device: Nasal cannula Oxygen Delivery: 2 LPM  Weight: 135 lbs 9.33 oz  GENERAL: Alert but falls asleep mid sentence, but easily awakes to voice. Appears uncomfortable. Pleasant and conversational.  HEENT: Anicteric sclera. Mucous membranes moist   CARDIOVASCULAR: RRR. S1, S2. No murmurs, rubs, or gallops.   RESPIRATORY: Effort normal on 2L. Clear to anterior auscultation bilaterally, respirations unlabored   GI: Abdomen soft, Mild TTP at PEGJ site in RUQ of abdomen without rebound or guarding. No peritoneal signs. normoactive bowel sounds present  EXTREMITIES: No peripheral edema.   NEUROLOGICAL: CN II-XII grossly intact. Moving all extremities symmetrically.   SKIN: Intact. Warm and dry. No jaundice.     Data   Data reviewed today: I reviewed all medications, new labs and imaging results over the last 24 hours.    Recent Labs   Lab 09/07/21  1305 09/07/21  1041 09/07/21  0657   GLC 86 83 81       Recent Results (from the past 24 hour(s))   POC US Guidance Needle Placement    Impression    TAP    XR Surgery GAMA G/T 5 Min Fluoro w Stills    Narrative    This exam was marked as non-reportable because it will not be read by a   radiologist or a [place] non-radiologist provider.

## 2021-09-07 NOTE — ANESTHESIA CARE TRANSFER NOTE
Patient: Dinora Mcghee    Procedure(s):  GASTROJEJUNOSTOMY TUBE PLACEMENT, PERCUTANEOUS, WITH GASTROPEXY    Diagnosis: Adult failure to thrive [R62.7]  Diagnosis Additional Information: No value filed.    Anesthesia Type:   General     Note:    Oropharynx: spontaneously breathing  Level of Consciousness: drowsy  Oxygen Supplementation: face mask  Level of Supplemental Oxygen (L/min / FiO2): 6  Independent Airway: airway patency satisfactory and stable  Dentition: dentition unchanged  Vital Signs Stable: post-procedure vital signs reviewed and stable  Report to RN Given: handoff report given  Patient transferred to: PACU  Comments: Airway :Face Mask  Patient transferred to:PACU  Comments: Prior to extubation, patient was reversed with 150 mg of Sugammadex and shortly afterwards observed with spontaneous breathing with regular pattern and proper tidal volumes. Patient woke up, opened their eyes to verbal stimuli and followed basic commands. Patient was suctioned and extubated without complication.   Transported to PACU on 6L O2 via face mask.   VSS upon arrival to PACU.  Patient denies nausea or pain at this time.   Care transfer plan communicated, appropriate time for questions was given and patient care transferred to PACU RN       Scott Shepherd MD  Handoff Report: Identifed the Patient, Identified the Reponsible Provider, Reviewed the pertinent medical history, Discussed the surgical course, Reviewed Intra-OP anesthesia mangement and issues during anesthesia, Set expectations for post-procedure period and Allowed opportunity for questions and acknowledgement of understanding      Vitals:  Vitals Value Taken Time   BP 98/60 09/07/21 1039   Temp     Pulse 83 09/07/21 1042   Resp     SpO2 100 % 09/07/21 1042   Vitals shown include unvalidated device data.    Electronically Signed By: Scott Shepherd MD  September 7, 2021  10:44 AM

## 2021-09-07 NOTE — ANESTHESIA POSTPROCEDURE EVALUATION
Patient: Dinora Mcghee    Procedure(s):  GASTROJEJUNOSTOMY TUBE PLACEMENT, PERCUTANEOUS, WITH GASTROPEXY    Diagnosis:Adult failure to thrive [R62.7]  Diagnosis Additional Information: No value filed.    Anesthesia Type:  General    Note:  Disposition: Admission   Postop Pain Control: Uneventful            Sign Out: Well controlled pain   PONV: No   Neuro/Psych: Uneventful            Sign Out: Acceptable/Baseline neuro status   Airway/Respiratory: Uneventful            Sign Out: Acceptable/Baseline resp. status   CV/Hemodynamics: Uneventful            Sign Out: Acceptable CV status; No obvious hypovolemia; No obvious fluid overload   Other NRE: NONE   DID A NON-ROUTINE EVENT OCCUR? No           Last vitals:  Vitals Value Taken Time   /54 09/07/21 1350   Temp 36.8  C (98.2  F) 09/07/21 1245   Pulse 88 09/07/21 1358   Resp 16 09/07/21 1245   SpO2 97 % 09/07/21 1358   Vitals shown include unvalidated device data.    Electronically Signed By: Sarabjit Menendez MD  September 7, 2021  1:59 PM

## 2021-09-07 NOTE — ANESTHESIA PROCEDURE NOTES
Rectus Sheath Procedure Note  Pre-Procedure   Staff -        Anesthesiologist:  Ruiz Mc MD       Resident/Fellow: Remington Gao DO       Performed By: resident       Location: pre-op       Procedure Start/Stop Times: 9/7/2021 9:05 AM and 9/7/2021 9:08 AM       Pre-Anesthestic Checklist: patient identified, IV checked, site marked, risks and benefits discussed, informed consent, monitors and equipment checked, pre-op evaluation, at physician/surgeon's request and post-op pain management  Timeout:       Correct Patient: Yes        Correct Procedure: Yes        Correct Site: Yes        Correct Position: Yes        Correct Laterality: Yes        Site Marked: Yes  Procedure Documentation  Procedure: Rectus Sheath       Diagnosis: POST OPERATIVE PAIN       Laterality: bilateral       Patient Position: supine       Patient Prep/Sterile Barriers: sterile gloves, mask       Skin prep: Chloraprep       Needle Type: short bevel       Needle Gauge: 21.        Needle Length (millimeters): 110        Ultrasound guided       1. Ultrasound was used to identify targeted nerve, plexus, vascular marker, or fascial plane and place a needle adjacent to it in real-time.       2. Ultrasound was used to visualize the spread of anesthetic in close proximity to the above referenced structure.       3. A permanent image is entered into the patient's record.    Assessment/Narrative         The placement was negative for: blood aspirated, painful injection and site bleeding       Paresthesias: No.     Bolus given via needle..        Secured via.        Insertion/Infusion Method: Single Shot       Complications: none       Injection made incrementally with aspirations every 5 mL.

## 2021-09-08 ENCOUNTER — HOME INFUSION (PRE-WILLOW HOME INFUSION) (OUTPATIENT)
Dept: PHARMACY | Facility: CLINIC | Age: 56
End: 2021-09-08

## 2021-09-08 ENCOUNTER — APPOINTMENT (OUTPATIENT)
Dept: EDUCATION SERVICES | Facility: CLINIC | Age: 56
End: 2021-09-08
Attending: INTERNAL MEDICINE
Payer: COMMERCIAL

## 2021-09-08 LAB
ALBUMIN SERPL-MCNC: 3.4 G/DL (ref 3.4–5)
ALBUMIN UR-MCNC: NEGATIVE MG/DL
ALP SERPL-CCNC: 99 U/L (ref 40–150)
ALT SERPL W P-5'-P-CCNC: 126 U/L (ref 0–50)
ANION GAP SERPL CALCULATED.3IONS-SCNC: 6 MMOL/L (ref 3–14)
ANION GAP SERPL CALCULATED.3IONS-SCNC: 7 MMOL/L (ref 3–14)
APPEARANCE UR: CLEAR
AST SERPL W P-5'-P-CCNC: 59 U/L (ref 0–45)
BILIRUB SERPL-MCNC: 1.2 MG/DL (ref 0.2–1.3)
BILIRUB UR QL STRIP: NEGATIVE
BUN SERPL-MCNC: 6 MG/DL (ref 7–30)
BUN SERPL-MCNC: 8 MG/DL (ref 7–30)
CALCIUM SERPL-MCNC: 9 MG/DL (ref 8.5–10.1)
CALCIUM SERPL-MCNC: 9.1 MG/DL (ref 8.5–10.1)
CHLORIDE BLD-SCNC: 103 MMOL/L (ref 94–109)
CHLORIDE BLD-SCNC: 104 MMOL/L (ref 94–109)
CO2 SERPL-SCNC: 28 MMOL/L (ref 20–32)
CO2 SERPL-SCNC: 30 MMOL/L (ref 20–32)
COLOR UR AUTO: ABNORMAL
CREAT SERPL-MCNC: 0.56 MG/DL (ref 0.52–1.04)
CREAT SERPL-MCNC: 0.62 MG/DL (ref 0.52–1.04)
ERYTHROCYTE [DISTWIDTH] IN BLOOD BY AUTOMATED COUNT: 13.6 % (ref 10–15)
GFR SERPL CREATININE-BSD FRML MDRD: >90 ML/MIN/1.73M2
GFR SERPL CREATININE-BSD FRML MDRD: >90 ML/MIN/1.73M2
GLUCOSE BLD-MCNC: 108 MG/DL (ref 70–99)
GLUCOSE BLD-MCNC: 124 MG/DL (ref 70–99)
GLUCOSE UR STRIP-MCNC: NEGATIVE MG/DL
HCT VFR BLD AUTO: 39.5 % (ref 35–47)
HGB BLD-MCNC: 13 G/DL (ref 11.7–15.7)
HGB UR QL STRIP: NEGATIVE
KETONES UR STRIP-MCNC: 10 MG/DL
LEUKOCYTE ESTERASE UR QL STRIP: ABNORMAL
MAGNESIUM SERPL-MCNC: 1.8 MG/DL (ref 1.6–2.3)
MAGNESIUM SERPL-MCNC: 1.9 MG/DL (ref 1.6–2.3)
MCH RBC QN AUTO: 31.3 PG (ref 26.5–33)
MCHC RBC AUTO-ENTMCNC: 32.9 G/DL (ref 31.5–36.5)
MCV RBC AUTO: 95 FL (ref 78–100)
NITRATE UR QL: NEGATIVE
PH UR STRIP: 5.5 [PH] (ref 5–7)
PHOSPHATE SERPL-MCNC: 2.5 MG/DL (ref 2.5–4.5)
PHOSPHATE SERPL-MCNC: 2.6 MG/DL (ref 2.5–4.5)
PLATELET # BLD AUTO: 303 10E3/UL (ref 150–450)
POTASSIUM BLD-SCNC: 3.6 MMOL/L (ref 3.4–5.3)
POTASSIUM BLD-SCNC: 3.8 MMOL/L (ref 3.4–5.3)
PROT SERPL-MCNC: 6.7 G/DL (ref 6.8–8.8)
RBC # BLD AUTO: 4.15 10E6/UL (ref 3.8–5.2)
RBC URINE: 1 /HPF
SODIUM SERPL-SCNC: 138 MMOL/L (ref 133–144)
SODIUM SERPL-SCNC: 140 MMOL/L (ref 133–144)
SP GR UR STRIP: 1.01 (ref 1–1.03)
SQUAMOUS EPITHELIAL: <1 /HPF
UROBILINOGEN UR STRIP-MCNC: NORMAL MG/DL
WBC # BLD AUTO: 7 10E3/UL (ref 4–11)
WBC URINE: 3 /HPF

## 2021-09-08 PROCEDURE — 250N000013 HC RX MED GY IP 250 OP 250 PS 637: Performed by: PHYSICIAN ASSISTANT

## 2021-09-08 PROCEDURE — 250N000011 HC RX IP 250 OP 636: Performed by: PHYSICIAN ASSISTANT

## 2021-09-08 PROCEDURE — 84100 ASSAY OF PHOSPHORUS: CPT | Performed by: PHYSICIAN ASSISTANT

## 2021-09-08 PROCEDURE — 83735 ASSAY OF MAGNESIUM: CPT | Performed by: PHYSICIAN ASSISTANT

## 2021-09-08 PROCEDURE — 250N000013 HC RX MED GY IP 250 OP 250 PS 637: Performed by: PEDIATRICS

## 2021-09-08 PROCEDURE — 120N000011 HC R&B TRANSPLANT UMMC

## 2021-09-08 PROCEDURE — 82310 ASSAY OF CALCIUM: CPT | Performed by: PHYSICIAN ASSISTANT

## 2021-09-08 PROCEDURE — 250N000013 HC RX MED GY IP 250 OP 250 PS 637: Performed by: ANESTHESIOLOGY

## 2021-09-08 PROCEDURE — 258N000003 HC RX IP 258 OP 636: Performed by: PHYSICIAN ASSISTANT

## 2021-09-08 PROCEDURE — 99232 SBSQ HOSP IP/OBS MODERATE 35: CPT | Performed by: PEDIATRICS

## 2021-09-08 PROCEDURE — 36415 COLL VENOUS BLD VENIPUNCTURE: CPT | Performed by: PHYSICIAN ASSISTANT

## 2021-09-08 PROCEDURE — 80053 COMPREHEN METABOLIC PANEL: CPT | Performed by: PHYSICIAN ASSISTANT

## 2021-09-08 PROCEDURE — 96375 TX/PRO/DX INJ NEW DRUG ADDON: CPT

## 2021-09-08 PROCEDURE — G0378 HOSPITAL OBSERVATION PER HR: HCPCS

## 2021-09-08 PROCEDURE — 96376 TX/PRO/DX INJ SAME DRUG ADON: CPT

## 2021-09-08 PROCEDURE — 999N000128 HC STATISTIC PERIPHERAL IV START W/O US GUIDANCE

## 2021-09-08 PROCEDURE — 81001 URINALYSIS AUTO W/SCOPE: CPT | Performed by: STUDENT IN AN ORGANIZED HEALTH CARE EDUCATION/TRAINING PROGRAM

## 2021-09-08 PROCEDURE — 85027 COMPLETE CBC AUTOMATED: CPT | Performed by: PHYSICIAN ASSISTANT

## 2021-09-08 RX ORDER — ACARBOSE 25 MG/1
50 TABLET ORAL 3 TIMES DAILY PRN
Status: DISCONTINUED | OUTPATIENT
Start: 2021-09-08 | End: 2021-09-12 | Stop reason: HOSPADM

## 2021-09-08 RX ORDER — ZOLMITRIPTAN 2.5 MG/1
5 TABLET, ORALLY DISINTEGRATING ORAL
Status: DISCONTINUED | OUTPATIENT
Start: 2021-09-08 | End: 2021-09-12 | Stop reason: HOSPADM

## 2021-09-08 RX ADMIN — OXYCODONE HYDROCHLORIDE 5 MG: 5 TABLET ORAL at 00:20

## 2021-09-08 RX ADMIN — LEVOTHYROXINE SODIUM 137 MCG: 0.14 TABLET ORAL at 09:43

## 2021-09-08 RX ADMIN — Medication 0.25 MG: at 16:18

## 2021-09-08 RX ADMIN — SODIUM BICARBONATE 325 MG: 325 TABLET ORAL at 04:42

## 2021-09-08 RX ADMIN — PROCHLORPERAZINE EDISYLATE 10 MG: 5 INJECTION INTRAMUSCULAR; INTRAVENOUS at 04:39

## 2021-09-08 RX ADMIN — MONTELUKAST 10 MG: 10 TABLET, FILM COATED ORAL at 23:04

## 2021-09-08 RX ADMIN — ZOLMITRIPTAN 5 MG: 2.5 TABLET, ORALLY DISINTEGRATING ORAL at 09:43

## 2021-09-08 RX ADMIN — PROCHLORPERAZINE EDISYLATE 10 MG: 5 INJECTION INTRAMUSCULAR; INTRAVENOUS at 10:25

## 2021-09-08 RX ADMIN — ZOLMITRIPTAN 5 MG: 2.5 TABLET, ORALLY DISINTEGRATING ORAL at 06:48

## 2021-09-08 RX ADMIN — DOCUSATE SODIUM 50 MG AND SENNOSIDES 8.6 MG 2 TABLET: 8.6; 5 TABLET, FILM COATED ORAL at 09:51

## 2021-09-08 RX ADMIN — POLYETHYLENE GLYCOL 3350 17 G: 17 POWDER, FOR SOLUTION ORAL at 09:51

## 2021-09-08 RX ADMIN — SODIUM BICARBONATE 325 MG: 325 TABLET ORAL at 00:32

## 2021-09-08 RX ADMIN — ACARBOSE 50 MG: 25 TABLET ORAL at 18:43

## 2021-09-08 RX ADMIN — PANCRELIPASE 1 CAPSULE: 60000; 12000; 38000 CAPSULE, DELAYED RELEASE PELLETS ORAL at 00:31

## 2021-09-08 RX ADMIN — FAMOTIDINE 20 MG: 20 TABLET, FILM COATED ORAL at 09:44

## 2021-09-08 RX ADMIN — SODIUM CHLORIDE, POTASSIUM CHLORIDE, SODIUM LACTATE AND CALCIUM CHLORIDE: 600; 310; 30; 20 INJECTION, SOLUTION INTRAVENOUS at 19:35

## 2021-09-08 RX ADMIN — CETIRIZINE HYDROCHLORIDE 10 MG: 10 TABLET, FILM COATED ORAL at 09:44

## 2021-09-08 RX ADMIN — PANCRELIPASE 1 CAPSULE: 60000; 12000; 38000 CAPSULE, DELAYED RELEASE PELLETS ORAL at 04:42

## 2021-09-08 RX ADMIN — AMITRIPTYLINE HYDROCHLORIDE 60 MG: 10 TABLET, FILM COATED ORAL at 23:04

## 2021-09-08 RX ADMIN — ZOLMITRIPTAN 5 MG: 5 TABLET, FILM COATED ORAL at 04:43

## 2021-09-08 ASSESSMENT — MIFFLIN-ST. JEOR: SCORE: 1257.68

## 2021-09-08 ASSESSMENT — ACTIVITIES OF DAILY LIVING (ADL)
ADLS_ACUITY_SCORE: 12
ADLS_ACUITY_SCORE: 12

## 2021-09-08 NOTE — PROGRESS NOTES
Home Infusion  Patient is expected to discharge soon and will be going home on continuous enteral feeds.  She has done home enteral therapy before and had an appt with [facility] for some initial teaching.  Benefits have been checked and pt will have 100% coverage through her BCBS  plan subject to meeting their deductible and OOP expenses.  Met with patient at bedside to discuss discharge plans, inform her of the coverage and offer choice of agency for the home enteral services.  She stated she would like to keep her services in the [employer] [place] system so would like to use [facility].   Explained about administration method via the Infinity pump with backpack for mobility.   Assessed for supply needs and discussed plan for hook up of Infinity home feeding pump at the hospital on day of discharge as well as ongoing home SNVs at home if needed through [facility].  Informed her about supplies and delivery of supplies, storage of formula, plan for SNV and 24/7 availability of [facility] while on enteral therapy.     Patient verbalized understanding of all information given.  She is willing and able to learn and manage home enteral therapy.   Questions answered.  Will continue to follow and update pt with more details as appropriate.    Tiff Hernandez RN / Nurse Liaison  [facility]  [email or web address]  Cell [phone] -F/ I office [phone] *24/7

## 2021-09-08 NOTE — PROGRESS NOTES
CLINICAL NUTRITION SERVICES - Brief NOTE     Nutrition Prescription    Recommendations already ordered by Registered Dietitian (RD):  1. Discontinue Creon 12 and sodium bicarbonate  2. Order Relizorb      Future/Additional Recommendations:  -- tolerance to TF  -- signs of malabsorption      EVALUATION OF THE PROGRESS TOWARD GOALS   Diet: Clear Liquid  Nutrition Support: Vital 1.5 Abdulkadir @ goal of  45ml/hr  (1080ml/day) will provide: 1620 kcals, 72 g PRO, 825 ml free H20, 201 g CHO, and 6 g fiber daily.     NEW FINDINGS   Labs (9/8): phos 2.5 mg/dL, Mg++ 1.9 mg/dL K+ 3.8 mmol/L   Meds: Creon 12 1-2 capsules and sodium bicarbonate 325 mg every 4 hours with TF     INTERVENTIONS  Implementation  1. Collaboration with other providers - discussed FEN/GI with team. Team okayed this writer to discontinue PERT/sodium bicarbonate and order Relizorb for TF. Care coordinator checked and patient's insurance covers. Discussed changes with patient, patient's spouse, pharm, and bedside nurse     Monitoring/Evaluation  Progress toward goals will be monitored and evaluated per protocol.    Angelika Feliciano, MS/RD/LD/CNSC  7A RD Pager: [phone]

## 2021-09-08 NOTE — PLAN OF CARE
"Vitals: /71 (BP Location: Left arm)   Pulse 76   Temp 97.1  F (36.2  C) (Oral)   Resp 18   Ht 1.702 m (5' 7\")   Wt 63 kg (138 lb 14.4 oz)   SpO2 99%   BMI 21.75 kg/m      Endocrine: n/a  Labs: Stable labs.  Pain: Minimal G/JT site pain, declined intervention.  PRN's: Compazine 10 mg X1, has Phenergan and Ativan orally if needed.  Diet: Clear liquid diet, Vital 1.5 with enzymes, unable to increase rate due to nausea, team is aware.  LDA: PIV LR at 75cc/hr. G tube to gravity bad (due to nausea), JT to feeds.  GI: BM 9/8.  : Bladder scanned for 287cc, has needed straight cathing for retention recently. Patient was able to void spontaneously this afternoon.  Skin: G/JT in place.   Neuro: Peasant, alert and oriented,  here this afternoon.  Mobility: Minimal stand by assist, was able to walk in the halls.  Education: Tube feeding administration review given by PLC.  Plan: Increase tube feeding as tolerated.    "

## 2021-09-08 NOTE — PROGRESS NOTES
[facility]    Medicine Progress Note - Hospitalist Service, Gold 6       Date of Admission:  9/7/2021    Assessment & Plan         Dinora Mcghee is a 56 year old female with PMHx hypothyroidism, DJD, GERD, hx of pituitary adenoma s/p resection, IgG4 pseudotumor of liver, chronic pancreatitis s/p TPAIT, chronic N/V recently diagnosed with gastroparesis with PEG placement admitted on 9/7/2021, following replacement of percutaneous GJ tube for observation.    # S/p percutaneous GJ tube placement   # Gastroparesis   # Severe malnutrition, POA - Patient follows with GI for chronic abdominal pain, nausea, vomiting, and weight loss. Recent gastric emptying showing gastroparesis on 8/24. Please see prior GI outpatient note 8/9/2021. Patient receives IV infusion therapy via port. Had a G-tube, and presents for exchange to GJ, which was successfully placed with the distal end in the mid jejunum. Abdominal exam >4 hours since procedure benign.   - ADAT  - Avoid NSAIDs, anti-thrombotic for at least 3 days   - Nutrition consulted appreciate recs (please see note on 9/7 for details)    - Vital 1.5 kevin (goal @45mL/hr), but currently only able to tolerate at 15 ml/hr. Continue to titrate to goal   - Add Relizorb Q4H instead of creon    - If unable to tolerate PO fluids, will add 60 mL free water 6x daily (total 800 mL daily)   - Monitor for refeeding syndrome, Trend BMP, Mag, Phos   - Change dressing once daily   - Pain management: tylenol 650 mg Q6H PRN, Oxycodone 5 mg Q4H PRN,    - Continue compazine 5 mg Q6H PRN, promethazine 25 mg daily PRN, scopolamine patch. Trial ativan 0.25 mg Q4H PRN nausea  - Outpatient follow up with Dr. Park 2 weeks after GJ, for discussion of GPOEM and domperidone options    - LR at 75 ml/hr until PO improves/tolerates TF    # Urinary retention - Developed urinary retention overnight, likely due to pain and medications.   - Voiding trial Qshit and  straight cath >300 ml  - Ambulate frequently  - Avoid pain medications and anti-cholinergics if able (uses benadryl for nausea PRN)     # Chronic pancreatitis s/p TPAIT  - Transitioning to Relizorb via TF, will discontinue creon     # Hx of IgG4 pseudotumor - Work up with elevated LFTs with mildly elevated IgG4. Liver lesion previously biopsied with noticeable proliferation and acute and chronic inflammation and fibrosis of liver with focally increased IgG4-positive plasma cells consistent with IgG4 pseudotumor. Followed by hepatology. LFTs at baseline.   - Outpatient follow up with GI     # Hypoglycemia - Continue PTA Acarbose 50 mg TID     # Chronic constipation -Continue PTA motegrity 2 mg daily, Miralax daily,  Senna-colace 1-2 tabs BID,     # GERD - Continue famotidine 20 mg BID, omeprazole 40 mg BID     # RAD, allergic rhinitis  - Azelastine 0.1% nasal spray, 1 spray BID, Cetrizine 10 mg daily, benadryl 25 mg Q6H PRN, and montelukast 10 mg daily.     # Hypothyroidism - TSH 2.41 on 9/14/2020. Continue PTA Levothyroxine 137 mcg daily     # Hx of migraine HA - Continue PTA Amitriptyline 60 mg QHS and Zolmitriptan 5 mg as needed at onset of headache          Diet: Clear Liquid Diet  Adult Formula Drip Feeding: Continuous Vital 1.5; Jejunostomy; Goal Rate: 45; mL/hr; Medication - Feeding Tube Flush Frequency: At least 15-30 mL water before and after medication administration and with tube clogging; Amount to Send (Nutrition us...    DVT Prophylaxis: Low Risk/Ambulatory with no VTE prophylaxis indicated  Piedra Catheter: Not present  Central Lines: None  Code Status: Full Code      Disposition Plan   Expected discharge: 09/10/2021   recommended to prior living arrangement once Tolerating tube feeds at goal rate.     The patient's care was discussed with the Attending Physician, Dr. Levon Nair, Bedside Nurse, Care Coordinator/, Patient and Dietitian  Consultant.    Laina Mirza PA-C  Hospitalist  Service, [address]  Securely message with the [employer] Web Console (learn more here)  Text page via [facility] Paging/Directory  Please see sign in/sign out for up to date coverage information    Clinically Significant Risk Factors Present on Admission               # Severe Malnutrition, POA: based on Weight loss;Reduced intake (09/07/21 1800)     ______________________________________________________________________    Interval History   Patient reports N/V overnight and this morning, and tube feeds had to be decreased to 15 ml/hr this AM. Reports some relief of nausea with ativan. States her abdominal pain has improved significantly and has not required any pain medications since yesterday. Reports difficulty emptying bladder. Denies any dysuria or fevers. Denies any CP or SOB.     Data reviewed today: I reviewed all medications, new labs and imaging results over the last 24 hours.     Physical Exam   Vital Signs: Temp: 97.3  F (36.3  C) Temp src: Oral BP: 113/69 Pulse: 80   Resp: 18 SpO2: 98 % O2 Device: None (Room air) Oxygen Delivery: 1 LPM  Weight: 138 lbs 14.4 oz   GENERAL: Alert and oriented x 3. NAD. Pleasant and conversational  HEENT: AT/NC.  Anicteric sclera. Mucous membranes moist   CARDIOVASCULAR: RRR. S1, S2. No murmurs, rubs, or gallops.   RESPIRATORY: Effort normal on RA. Clear to auscultation bilaterally, no rales, rhonchi or wheezes, respirations unlabored   GI: Abdomen soft, non-tender abdomen without rebound or guarding, normoactive bowel sounds present. PEGJ tube in place.   EXTREMITIES: No peripheral edema.   NEUROLOGICAL:  CN II-XII grossly intact. Moving all extremities symmetrically.   SKIN: Intact. Warm and dry. No jaundice.     Data   Recent Labs   Lab 09/08/21  0605 09/07/21  1305 09/07/21  1041   WBC 7.0  --   --    HGB 13.0  --   --    MCV 95  --   --      --   --      --   --    POTASSIUM 3.8  --   --    CHLORIDE 103  --    --    CO2 28  --   --    BUN 8  --   --    CR 0.62  --   --    ANIONGAP 7  --   --    KASSI 9.0  --   --    * 86 83   ALBUMIN 3.4  --   --    PROTTOTAL 6.7*  --   --    BILITOTAL 1.2  --   --    ALKPHOS 99  --   --    *  --   --    AST 59*  --   --      No results found for this or any previous visit (from the past 24 hour(s)).  Medications     lactated ringers 75 mL/hr at 09/08/21 1107       acarbose  50 mg Oral TID w/meals     amitriptyline  60 mg Oral At Bedtime     amylase-lipase-protease  1-2 capsule Per Feeding Tube Q4H    And     sodium bicarbonate  325 mg Per Feeding Tube Q4H     amylase-lipase-protease  3-4 capsule Oral TID w/meals     azelastine  1 spray Both Nostrils BID     cetirizine  10 mg Oral Daily     famotidine  20 mg Oral QAM     levothyroxine  137 mcg Oral QAM     montelukast  10 mg Oral At Bedtime     pantoprazole  40 mg Oral BID     polyethylene glycol  17 g Oral QAM     Prucalopride Succinate  2 mg Oral QAM     [START ON 9/10/2021] scopolamine  1 patch Transdermal Q72H     scopolamine   Transdermal Q8H     [START ON 9/10/2021] scopolamine   Transdermal Once     senna-docusate  1-2 tablet Oral QAM

## 2021-09-08 NOTE — PROGRESS NOTES
# Urinary retention  - Possibly from sedating meds  - Check UA  - Straight cath PRN for PVR > 400ml.  - Consider stopping IVFs once TFs are at goal; defer to day team    Keya Schuster DO, MS   of Medicine  General Internal Medicine  [facility]       Anesthesia Volume In Cc: 3.5

## 2021-09-08 NOTE — PROGRESS NOTES
Pt TF started at 2140.  Start rate for TF is 15 cc per hour per J tube. Pt given sodium bicarbonate in TF but Pharmacy sending for Creon medication coming from [facility].  Will be awaiting for Creon to come up and then RN will be able to add to TF. Will continue to monitor Pt TF and notify pt care team with any changes.

## 2021-09-08 NOTE — PLAN OF CARE
"/71 (BP Location: Right arm)   Pulse 74   Temp 97.6  F (36.4  C) (Oral)   Resp 12   Ht 1.702 m (5' 7\")   Wt 63 kg (138 lb 14.4 oz)   SpO2 100%   BMI 21.75 kg/m      Shift: 5910-5516  VS: VSS on RA, afebrile  Neuro: AOx4  Respiratory: irregular breathing, RR 6-10 while sleeping, clem BURLESON said to continue monitoring ; still on capno until this afternoon.   Pain/Nausea/PRN: PRN oxy x1, zolmitriptan x2 for migraine headache, compazine x1 for intermittent nausea, scopolamine patch in place behind R ear.   Diet: clear diet, had one popsicle.  LDA: PEG w/ cycled TF @15 from 3472-6813 (goal 45) ; G to gravity, red-clear output due to popsicle ; PIV infusing LR 100mL/hr.  GI/: no BM this shift ; bladder scan showed 443 mL, clem BURLESON and got orders to straight cath, straight cathed 1300mL clear, yellow urine ; continue to assess need for straight cath.  Skin: clean, dry, intact  Mobility: SBA  Plan: continue to monitor and update team with any changes.     Handoff given to following RN.    "

## 2021-09-08 NOTE — PROGRESS NOTES
Pt last void/urine output was at 0600 am today.  Pt up to BSC to try and urinate. Pt unable to urinate. Pt bladder scanned for 850 ml urine.  Pt care team notified. Orders for strait cath obtained.  RN strait cath pt for 1100 ml urine.  Pt stated that she felt less distended and abdomen was feeling better. Will continue to monitor I & O's and notify pt care team if any changes occur.

## 2021-09-08 NOTE — PHARMACY-CONSULT NOTE
Pharmacy Tube Feeding Consult    Medication reviewed for administration by feeding tube and for potential food/drug interactions.    Recommendation: No changes are needed at this time. Per RN, patient is able to swallow pills. Do not crush pantoprazole EC tab (auto-sub for omeprazole).     Pharmacy will continue to follow as new medications are ordered.     Laina Peters, PharmD

## 2021-09-08 NOTE — PROGRESS NOTES
2315 Pt c/o nausea and not feeling like she was tolerating TF at rate of 15 ml/hour. Turned down rate to 10 ml hour and notified next RN about pt not tolerating TF. Lorazapam PRN given X 1.  RN still awaiting Creon medication that has not come up from [facility] yet. TF stopped at 2340. Will restart TF when Creon medications arrives. Will continue to monitor nausea/abd discomfort and notify pt care team with any changes.

## 2021-09-08 NOTE — CONSULTS
Care Management Initial Consult    General Information  Assessment completed with: Patient, Care Team Member, -chart review,    Type of CM/SW Visit: Initial Assessment    Primary Care Provider verified and updated as needed: Yes   Readmission within the last 30 days:        Reason for Consult: care coordination/care conference, discharge planning  Advance Care Planning:            Communication Assessment  Patient's communication style: spoken language (English or Bilingual)             Cognitive  Cognitive/Neuro/Behavioral: WDL                      Living Environment:   People in home: spouse     Current living Arrangements: house      Able to return to prior arrangements: yes       Family/Social Support:  Care provided by: self  Provides care for: no one  Marital Status:             Description of Support System: Supportive, Involved    Support Assessment: Adequate family and caregiver support    Current Resources:   Patient receiving home care services: No     Community Resources: None  Equipment currently used at home: none  Supplies currently used at home: None    Employment/Financial:  Employment Status:          Financial Concerns: No concerns identified           Lifestyle & Psychosocial Needs:  Social Determinants of Health     Tobacco Use: Medium Risk     Smoking Tobacco Use: Former Smoker     Smokeless Tobacco Use: Never Used   Alcohol Use:      Frequency of Alcohol Consumption:      Average Number of Drinks:      Frequency of Binge Drinking:    Financial Resource Strain:      Difficulty of Paying Living Expenses:    Food Insecurity:      Worried About Running Out of Food in the Last Year:      Ran Out of Food in the Last Year:    Transportation Needs:      Lack of Transportation (Medical):      Lack of Transportation (Non-Medical):    Physical Activity:      Days of Exercise per Week:      Minutes of Exercise per Session:    Stress:      Feeling of Stress :    Social Connections:       Frequency of Communication with Friends and Family:      Frequency of Social Gatherings with Friends and Family:      Attends Spiritism Services:      Active Member of Clubs or Organizations:      Attends Club or Organization Meetings:      Marital Status:    Intimate Partner Violence:      Fear of Current or Ex-Partner:      Emotionally Abused:      Physically Abused:      Sexually Abused:    Depression: Not at risk     PHQ-2 Score: 2   Housing Stability:      Unable to Pay for Housing in the Last Year:      Number of Places Lived in the Last Year:      Unstable Housing in the Last Year:      Additional Information:  Patient admitted s/p GJ tube placement and initiation of enteral feeds.   Pt with medical history significant for TPIAT, gastroparesis.  PT has received PLC for enteral feeds/GJ tube site care.     Met with pt.  Introduced RNCC role.  Per discussion with patient prior to hospitalization pt was receiving outpatient IV hydration at [facility].  Discussed/reviewed anticipated plan for discharge.  Pt has utilized [facility] in the past and would prefer to utilize this agency again.  Pt notes no concerns regarding her abilty to manage enteral feeds at home.    [facility] Enteral Benefit Check: Patient is on our radar and does have coverage for enteral tube feeds under their BCBS plan as long as via tube. Both deductible ($2800) and out of pocket ($3000) have been met at this time so patient should have coverage at 100% at this time.     Paged provider team requesting return call.      1130: Per care team rounds team anticipates patient will be here at least a couple of days.  Dietician inquiring if reliazorb cartridges are covered by patients insurance.  Email sent to [facility].      1225: [facility] Benefit Check update: Relizorb cartridge is covered at 100% as well through [employer] [place] plan.  Reviewed with Rickey Salas.       Alina Joseph RN BSN, PHN, ACM-RN  7A RN Care Coordinator  Phone: [phone]  Pager  [phone]    9/8/2021 11:28 AM

## 2021-09-08 NOTE — UTILIZATION REVIEW
Inpatient appropriate    Admission Status; Secondary Review Determination      Under the authority of the Utilization Management Committee, the utilization review process indicated a secondary review on the above patient. The review outcome is based on review of the medical records, discussions with staff, and applying clinical experience noted on the date of the review.    (x) Inpatient Status Appropriate - This patient's medical care is consistent with medical management for inpatient care and reasonable inpatient medical practice.    RATIONALE FOR DETERMINATION  56-year-old female with history of hypothyroidism, gastroesophageal reflux disease, pituitary adenoma s/p resection, IgG4 pseudotumor of the liver, chronic pancreatitis s/pTPAIT, chronic nausea and vomiting, gastroparesis was admitted on 9/7/2021 after she underwent percutaneous GJ tube placement.  Discussed with Chikis Mirza, physician assistant with the primary team, tube feed has been started but she has not been tolerating well.  She is having ongoing issues with nausea and vomiting and has required multiple doses of antiemetics.  Given the severe malnutrition, she is also being monitored for potential refeeding syndrome.  Patient will need ongoing hospitalization for IV fluids as her oral intake is still inadequate.  Moreover she will have to be symptomatically treated until her tube feeding is at goal prior to safe discharge.  Inpatient care is appropriate at this time.    At the time of admission with the information available to the attending physician more than 2 nights Hospital complex care was anticipated, based on patient risk of adverse outcome if treated as outpatient and complex care required. Inpatient admission is appropriate based on the Medicare guidelines.    This document was produced using voice recognition software      The information on this document is developed by the utilization review team in order for the business office  to ensure compliance. This only denotes the appropriateness of proper admission status and does not reflect the quality of care rendered.  The definitions of Inpatient Status and Observation Status used in making the determination above are those provided in the CMS Coverage Manual, Chapter 1 and Chapter 6, section 70.4.    Sincerely,    Utilization Review  Physician Advisor  [facility].

## 2021-09-08 NOTE — CONSULTS
09/08/21 1104 Chanel Turk, RN     Patient seen at bedside to review GJ tube cares and use of Enteralite feeding pump. Pt has written material given to her 9/7/21

## 2021-09-08 NOTE — PROGRESS NOTES
"GASTROENTEROLOGY PROGRESS NOTE    ASSESSMENT:  56 year old female with medical history of chronic pancreatitis s/p TPIAT, IgG4 liver disease, GERD and recent diagnosis of gastroparesis admitted on 9/7 following elective PEG-J tube placement.     # CP s/p TPAIT  # Gastroparesis s/p PEG-J placement  Uneventful PEG-J placement. Now being followed by IM and RD for post-feeding tube placement care.     RECOMMENDATIONS:  - Appreciate medicine and RD assistance with care.  - Continue TFs as tolerated per RD recommendations  - Oral intake as tolerated  - Analgesics/antiemetics  - Continue holding anticoagulants  - Disposal per medicine, follow up with Dr. Park in GI clinic in 3-4 weeks     Judit Paz MD  Advanced GI Fellow  #[id number]  _______________________________________________________________  S: Feels nauseous this morning and had few episodes of small emesis attributed to migraine headaches and delay in receiving her scheduled meds. Mild pain at the surgery site, no abdominal distension, TF started last night. No fevers or chills.     O:  Blood pressure 127/71, pulse 76, temperature 97.1  F (36.2  C), temperature source Oral, resp. rate 18, height 1.702 m (5' 7\"), weight 63 kg (138 lb 14.4 oz), SpO2 99 %, not currently breastfeeding.    Gen: NAD, alert  HEENT: No pallor or icterus   CV: RRR, normal S1, S2  Lungs: CTABL  Abd: No distension. BS active in all quadrants. Appropriate tenderness at incision site, no hepatosplenomegaly or masses noted. Site C/D/I  Neuro: Non focal exam  Psych: mentation appears normal., affect and mood normal    LABS:  BMP  Recent Labs   Lab 09/08/21  0605 09/07/21  1305 09/07/21  1041 09/07/21  0657     --   --   --    POTASSIUM 3.8  --   --   --    CHLORIDE 103  --   --   --    KASSI 9.0  --   --   --    CO2 28  --   --   --    BUN 8  --   --   --    CR 0.62  --   --   --    * 86 83 81     CBC  Recent Labs   Lab 09/08/21  0605   WBC 7.0   RBC 4.15   HGB 13.0   HCT 39.5 "   MCV 95   MCH 31.3   MCHC 32.9   RDW 13.6        INRNo lab results found in last 7 days.  LFTs  Recent Labs   Lab 09/08/21  0605   ALKPHOS 99   AST 59*   *   BILITOTAL 1.2   PROTTOTAL 6.7*   ALBUMIN 3.4      PANCNo lab results found in last 7 days.

## 2021-09-09 ENCOUNTER — APPOINTMENT (OUTPATIENT)
Dept: GENERAL RADIOLOGY | Facility: CLINIC | Age: 56
End: 2021-09-09
Attending: PHYSICIAN ASSISTANT
Payer: COMMERCIAL

## 2021-09-09 LAB
ANION GAP SERPL CALCULATED.3IONS-SCNC: 5 MMOL/L (ref 3–14)
BUN SERPL-MCNC: 7 MG/DL (ref 7–30)
CALCIUM SERPL-MCNC: 9.2 MG/DL (ref 8.5–10.1)
CHLORIDE BLD-SCNC: 105 MMOL/L (ref 94–109)
CO2 SERPL-SCNC: 30 MMOL/L (ref 20–32)
CREAT SERPL-MCNC: 0.62 MG/DL (ref 0.52–1.04)
GFR SERPL CREATININE-BSD FRML MDRD: >90 ML/MIN/1.73M2
GLUCOSE BLD-MCNC: 149 MG/DL (ref 70–99)
GLUCOSE BLDC GLUCOMTR-MCNC: 142 MG/DL (ref 70–99)
HOLD SPECIMEN: NORMAL
MAGNESIUM SERPL-MCNC: 1.9 MG/DL (ref 1.6–2.3)
PHOSPHATE SERPL-MCNC: 2.4 MG/DL (ref 2.5–4.5)
POTASSIUM BLD-SCNC: 4 MMOL/L (ref 3.4–5.3)
PROVATION GI EXAM: NORMAL
SODIUM SERPL-SCNC: 140 MMOL/L (ref 133–144)

## 2021-09-09 PROCEDURE — 250N000013 HC RX MED GY IP 250 OP 250 PS 637: Performed by: ANESTHESIOLOGY

## 2021-09-09 PROCEDURE — 99232 SBSQ HOSP IP/OBS MODERATE 35: CPT | Performed by: PEDIATRICS

## 2021-09-09 PROCEDURE — 83735 ASSAY OF MAGNESIUM: CPT | Performed by: PHYSICIAN ASSISTANT

## 2021-09-09 PROCEDURE — 250N000011 HC RX IP 250 OP 636: Performed by: PHYSICIAN ASSISTANT

## 2021-09-09 PROCEDURE — 84100 ASSAY OF PHOSPHORUS: CPT | Performed by: PHYSICIAN ASSISTANT

## 2021-09-09 PROCEDURE — 74018 RADEX ABDOMEN 1 VIEW: CPT

## 2021-09-09 PROCEDURE — 120N000011 HC R&B TRANSPLANT UMMC

## 2021-09-09 PROCEDURE — 80048 BASIC METABOLIC PNL TOTAL CA: CPT | Performed by: PHYSICIAN ASSISTANT

## 2021-09-09 PROCEDURE — 74018 RADEX ABDOMEN 1 VIEW: CPT | Mod: 26 | Performed by: STUDENT IN AN ORGANIZED HEALTH CARE EDUCATION/TRAINING PROGRAM

## 2021-09-09 PROCEDURE — 250N000013 HC RX MED GY IP 250 OP 250 PS 637: Performed by: PHYSICIAN ASSISTANT

## 2021-09-09 PROCEDURE — 250N000013 HC RX MED GY IP 250 OP 250 PS 637: Performed by: PEDIATRICS

## 2021-09-09 PROCEDURE — 36415 COLL VENOUS BLD VENIPUNCTURE: CPT | Performed by: PHYSICIAN ASSISTANT

## 2021-09-09 RX ORDER — PROCHLORPERAZINE MALEATE 5 MG
5-10 TABLET ORAL EVERY 6 HOURS PRN
Status: DISCONTINUED | OUTPATIENT
Start: 2021-09-09 | End: 2021-09-12 | Stop reason: HOSPADM

## 2021-09-09 RX ORDER — HYDROMORPHONE HYDROCHLORIDE 1 MG/ML
0.3 INJECTION, SOLUTION INTRAMUSCULAR; INTRAVENOUS; SUBCUTANEOUS ONCE
Status: COMPLETED | OUTPATIENT
Start: 2021-09-09 | End: 2021-09-09

## 2021-09-09 RX ADMIN — AMITRIPTYLINE HYDROCHLORIDE 60 MG: 10 TABLET, FILM COATED ORAL at 22:26

## 2021-09-09 RX ADMIN — PROCHLORPERAZINE EDISYLATE 10 MG: 5 INJECTION INTRAMUSCULAR; INTRAVENOUS at 21:49

## 2021-09-09 RX ADMIN — LEVOTHYROXINE SODIUM 137 MCG: 0.14 TABLET ORAL at 08:42

## 2021-09-09 RX ADMIN — OXYCODONE HYDROCHLORIDE 5 MG: 5 TABLET ORAL at 01:06

## 2021-09-09 RX ADMIN — PROMETHAZINE HYDROCHLORIDE 25 MG: 25 TABLET ORAL at 18:37

## 2021-09-09 RX ADMIN — OXYCODONE HYDROCHLORIDE 5 MG: 5 TABLET ORAL at 05:23

## 2021-09-09 RX ADMIN — POTASSIUM & SODIUM PHOSPHATES POWDER PACK 280-160-250 MG 1 PACKET: 280-160-250 PACK at 13:42

## 2021-09-09 RX ADMIN — HYDROMORPHONE HYDROCHLORIDE 0.3 MG: 1 INJECTION, SOLUTION INTRAMUSCULAR; INTRAVENOUS; SUBCUTANEOUS at 22:26

## 2021-09-09 RX ADMIN — FAMOTIDINE 20 MG: 20 TABLET, FILM COATED ORAL at 08:37

## 2021-09-09 RX ADMIN — CETIRIZINE HYDROCHLORIDE 10 MG: 10 TABLET, FILM COATED ORAL at 08:37

## 2021-09-09 RX ADMIN — Medication 0.25 MG: at 12:03

## 2021-09-09 RX ADMIN — OXYCODONE HYDROCHLORIDE 5 MG: 5 TABLET ORAL at 11:09

## 2021-09-09 RX ADMIN — PANTOPRAZOLE SODIUM 40 MG: 40 TABLET, DELAYED RELEASE ORAL at 08:37

## 2021-09-09 RX ADMIN — POTASSIUM & SODIUM PHOSPHATES POWDER PACK 280-160-250 MG 1 PACKET: 280-160-250 PACK at 09:49

## 2021-09-09 RX ADMIN — PANTOPRAZOLE SODIUM 40 MG: 40 TABLET, DELAYED RELEASE ORAL at 22:10

## 2021-09-09 ASSESSMENT — ENCOUNTER SYMPTOMS
DECREASED CONCENTRATION: 0
FLANK PAIN: 0
BLOATING: 1
INSOMNIA: 1
DEPRESSION: 1
EYE WATERING: 0
BLOOD IN STOOL: 0
DYSURIA: 0
RECTAL PAIN: 0
POOR WOUND HEALING: 0
EYE IRRITATION: 0
EYE PAIN: 1
DIFFICULTY URINATING: 0
CONSTIPATION: 1
JAUNDICE: 0
EYE REDNESS: 1
PANIC: 0
NAUSEA: 1
SKIN CHANGES: 0
NERVOUS/ANXIOUS: 1
BOWEL INCONTINENCE: 0
NAIL CHANGES: 0
DIARRHEA: 1
VOMITING: 1
HEARTBURN: 1
HEMATURIA: 0
ABDOMINAL PAIN: 1
DOUBLE VISION: 0

## 2021-09-09 ASSESSMENT — ACTIVITIES OF DAILY LIVING (ADL)
ADLS_ACUITY_SCORE: 12

## 2021-09-09 ASSESSMENT — MIFFLIN-ST. JEOR: SCORE: 1240.89

## 2021-09-09 NOTE — PROGRESS NOTES
[facility]    Medicine Progress Note - Hospitalist Service, Gold 6       Date of Admission:  9/7/2021    Assessment & Plan            Dinora Mcghee is a 56 year old female with PMHx hypothyroidism, DJD, GERD, hx of pituitary adenoma s/p resection, IgG4 pseudotumor of liver, chronic pancreatitis s/p TPAIT, chronic N/V recently diagnosed with gastroparesis with PEG placement admitted on 9/7/2021, following replacement of percutaneous GJ tube for observation.    # S/p percutaneous GJ tube placement   # Gastroparesis   # Severe malnutrition, POA - Patient follows with GI for chronic abdominal pain, nausea, vomiting, and weight loss. Recent gastric emptying showing gastroparesis on 8/24. Please see prior GI outpatient note 8/9/2021. Patient receives IV infusion therapy via port. Had a G-tube, and presents for exchange to GJ, which was successfully placed with the distal end in the mid jejunum.  - ADAT  - Avoid NSAIDs, anti-thrombotic for at least 3 days   - Monitor for refeeding syndrome, Trend BMP, Mag, Phos   - Change dressing once daily   - Pain management: tylenol 650 mg Q6H PRN, Oxycodone 5 mg Q4H PRN,    - Continue compazine 5 mg Q6H PRN, promethazine 25 mg daily PRN, scopolamine patch. Trial ativan 0.25 mg Q4H PRN nausea  - Outpatient follow up with Dr. Park 2 weeks after GJ, for discussion of GPOEM and domperidone options    - Stop IVF  - Nutrition consulted appreciate recs (please see note on 9/7 for details)    - Vital 1.5 kevin (goal @45mL/hr), titrate to goal    - Add Relizorb Q4H instead of creon    - If unable to tolerate PO fluids, will add 60 mL free water 6x daily (total 800 mL daily)       -Once TF well tolerated at continuous rate of 45 ml/hr, consider attempting advancement to cycled regimen per the following schedule which can be done in outpatient:   Night 1: Increase to 65 ml/hr x 16 hours   Night 2: Increase to 75 ml/hr x 14 hours  Night 3: Increase  to 90 ml/hr x 12 hours    #Hypophosphatemia - replete per sliding scale     # Urinary retention, resolved - Developed urinary retention on DOA 1, likely due to pain and medications, which has now resolved. .   - Voiding trial Qshit and straight cath >300 ml  - Ambulate frequently  - Avoid pain medications and anti-cholinergics if able (uses benadryl for nausea PRN)     # Chronic pancreatitis s/p TPAIT  - Transitioning to Relizorb via TF, will discontinue creon. Will send message to patient's transplant coordinator and TPAIT team about how to dose acarbose on continuous TF.  - Continue PTA Acarbose 50 mg TID PRN for now    # Hx of IgG4 pseudotumor - Work up with elevated LFTs with mildly elevated IgG4. Liver lesion previously biopsied with noticeable proliferation and acute and chronic inflammation and fibrosis of liver with focally increased IgG4-positive plasma cells consistent with IgG4 pseudotumor. Followed by hepatology. LFTs at baseline.   - Outpatient follow up with GI     # Chronic constipation -Continue PTA motegrity 2 mg daily, Miralax daily,  Senna-colace 1-2 tabs BID,     # GERD - Continue famotidine 20 mg BID, omeprazole 40 mg BID     # RAD, allergic rhinitis  - Azelastine 0.1% nasal spray, 1 spray BID, Cetrizine 10 mg daily, benadryl 25 mg Q6H PRN, and montelukast 10 mg daily.     # Hypothyroidism - TSH 2.41 on 9/14/2020. Continue PTA Levothyroxine 137 mcg daily     # Hx of migraine HA - Continue PTA Amitriptyline 60 mg QHS and Zolmitriptan 5 mg as needed at onset of headache        Diet: Clear Liquid Diet  Adult Formula Drip Feeding: Continuous Vital 1.5; Jejunostomy; Goal Rate: 45; mL/hr; Medication - Feeding Tube Flush Frequency: At least 15-30 mL water before and after medication administration and with tube clogging; Amount to Send (Nutrition us...    DVT Prophylaxis: Pneumatic Compression Devices  Piedra Catheter: Not present  Central Lines: None  Code Status: Full Code      Disposition Plan    Expected discharge: 09/10/2021   recommended to prior living arrangement once adequate pain management/ tolerating PO medications and and tube feeds at goal rate.     The patient's care was discussed with the Attending Physician, Dr. Levon Nair, Bedside Nurse, Care Coordinator/, Patient and Dietitian  Consultant.    Laina Mirza PA-C  Hospitalist Service, [address]  Securely message with the Vocera Web Console (learn more here)  Text page via [facility] Paging/Directory  Please see sign in/sign out for up to date coverage information    Clinically Significant Risk Factors Present on Admission               # Severe Malnutrition, POA: based on Weight loss;Reduced intake (09/07/21 1800)     ______________________________________________________________________    Interval History   Patient states she is feeling better. Reported last vomiting yesterday evening, receiving IV anti-emetics, but denies any further episodes. Denies any abdominal pain. Has been able to urinate on her own without difficulty. Denies any CP, SOB. Has been able to drink plenty of water and some coffee, but otherwise reports poor PO intake, though has been tolerating TF up to 35 ml/hr today.     Data reviewed today: I reviewed all medications, new labs and imaging results over the last 24 hours.    Physical Exam   Vital Signs: Temp: 98.2  F (36.8  C) Temp src: Oral BP: 105/61 Pulse: 71   Resp: 16 SpO2: 97 % O2 Device: None (Room air)    Weight: 135 lbs 3.2 oz  GENERAL: Alert and awake. NAD. Pleasant and conversational   HEENT: AT/NC. Anicteric sclera. Mucous membranes moist   CARDIOVASCULAR: RRR. S1, S2. No murmurs, rubs, or gallops.   RESPIRATORY: Effort normal on RA. Clear to auscultation bilaterally, no rales, rhonchi or wheezes, respirations unlabored   GI: Abdomen soft, non-tender abdomen without rebound or guarding, normoactive bowel sounds present, PEGJ in place.    EXTREMITIES: No peripheral edema.  NEUROLOGICAL:  CN II-XII grossly intact. Moving all extremities symmetrically.   SKIN: Intact. Warm and dry.  No jaundice.     Data   Recent Labs   Lab 09/09/21  0836 09/09/21  0613 09/08/21  1645 09/08/21  0605   WBC  --   --   --  7.0   HGB  --   --   --  13.0   MCV  --   --   --  95   PLT  --   --   --  303   NA  --  140 140 138   POTASSIUM  --  4.0 3.6 3.8   CHLORIDE  --  105 104 103   CO2  --  30 30 28   BUN  --  7 6* 8   CR  --  0.62 0.56 0.62   ANIONGAP  --  5 6 7   KASSI  --  9.2 9.1 9.0   * 149* 108* 124*   ALBUMIN  --   --   --  3.4   PROTTOTAL  --   --   --  6.7*   BILITOTAL  --   --   --  1.2   ALKPHOS  --   --   --  99   ALT  --   --   --  126*   AST  --   --   --  59*     No results found for this or any previous visit (from the past 24 hour(s)).  Medications       amitriptyline  60 mg Oral At Bedtime     cetirizine  10 mg Oral Daily     famotidine  20 mg Oral QAM     levothyroxine  137 mcg Oral QAM     montelukast  10 mg Oral At Bedtime     pantoprazole  40 mg Oral BID     polyethylene glycol  17 g Oral QAM     potassium & sodium phosphates  1 packet Oral TID     [START ON 9/10/2021] scopolamine  1 patch Transdermal Q72H     scopolamine   Transdermal Q8H     [START ON 9/10/2021] scopolamine   Transdermal Once     senna-docusate  1-2 tablet Oral QAM

## 2021-09-09 NOTE — PLAN OF CARE
"Time: 1500 - 1730     Reason for admission/Dx:   Adult failure to thrive [R62.7]      /71 (BP Location: Right arm)   Pulse 77   Temp 97.4  F (36.3  C) (Oral)   Resp 16   Ht 1.702 m (5' 7\")   Wt 63 kg (138 lb 14.4 oz)   SpO2 96%   BMI 21.75 kg/m       Shift changes: VSS, on RA. AOx4, up ad koffi. Electrolytes WNL. Pt asymptomatic, without acute distress. Nausea improving with current PRN medications. PIV infusing LR @ 75. Clear liquid diet with fair appetite.  LBM 9/8. Voids WDL.      Plan: TF increased to 25 with inline lipase added.  Will increase tonight as patient tolerates.    "

## 2021-09-09 NOTE — PROGRESS NOTES
"CLINICAL NUTRITION SERVICES - ASSESSMENT NOTE     Nutrition Prescription    RECOMMENDATIONS FOR MDs/PROVIDERS TO ORDER:  Continue to replete phosphorus as needed.  Discontinue packets when no longer necessary as these can lead to diarrhea.     Malnutrition Status:    Severe malnutrition in the context of acute on chronic illness    Future/Additional Recommendations:  Updated discharge instructions to include relizorb recommendations:     Tube Feeding: Vital 1.5 (or can use Peptamen 1.5) @ 45 ml/hr continuous via J-tube  When TF running continuous, attach 1 relizorb cartridge every 12 hours.      Water flushes: At least 60 ml free water 6 times per day.  If no oral intake consumed, recommend at least 800 ml daily via FT to maintain hydration.     Once TF well tolerated at continuous rate of 45 ml/hr for at least 24 hours, consider attempting advancement to cycled regimen per the following schedule:  Night 1: Increase to 65 ml/hr x 16 hours (change relizorb cartridge every 8 hours)  Night 2: Increase to 75 ml/hr x 14 hours (change relizorb cartridge every 7 hours)  Night 3: Increase to 90 ml/hr x 12 hours  (charge relizorb cartridge every 6 hours or attach both cartridges at the start of the cycle \"double up\" on the cartridges)        REASON FOR ASSESSMENT  Dinora Mcghee is a/an 56 year old female assessed by the dietitian for Provider Order - Registered Dietitian to Assess and Order TF per Medical Nutrition Therapy Protocol    NUTRITION HISTORY  Nutrition Hx per outpatient RD note:   \"Patient is a 56 year old female with history of gastroparesis, hx TPIAT, post pancreatectomy diabetes who c/o of having discomfort, pain, bloating with everything that she eats and even with drinking. She has had some recent vomiting as well. She has been drinking a soylent plant based protein drinks as these are the only ones she tolerates, but it takes her 1 hour to drink 8 oz of the drink. She tried making some homemade juice and " "double strained to remove pulp but this caused extreme pain. She is also eating some broth based soups and only bites of food such as butternut squash and a small amount of crackers. She is drinking 60 oz of water but tolerates it better if it's warmer. She finding it harder and harder to tolerate any food. She reported losing about 20 lbs since her last visit with writer in Dec 2020. She is feeling very weak as well. She has had a feeding tube in the past but had a bad experience all around with having one, so was hesitant to have another one but after recent visit/discussions with MD, and her inability to tolerate or take in much orally, she is going to have a G-J feeding tube placed for nutrition. Based on discussion with her and per MD noted, plan to place tube as close to pylorus as possible. She stated that previously it took her a while to get to goal rate, so slowly increased rate. She remembers dissolving enzymes in bicarbonate and warm water and adding to TF formula, so just will need recommendation when she has her FT placed on how many and timing.     Due to her chronic pain, she is taking warm baths, using a heating pad to try to relieve some pain. Takes tylenol and also compazine for nausea. She reported that yesterday, she had 3 BMs yesterday but none for a few days before that. She tries to do some walking but if she does too much then gets dehydrated. She has been trying to eat 1200 calories per day but is not able to meet this amount.\"     CURRENT NUTRITION ORDERS  Diet: Clear Liquid  TF: Vital 1.5 @ 35 ml/hr (goal = 45 ml/hr)  Relizorb covered by insurance so this is being used for PERT    Intake/Tolerance: Had some issues with tolerance at first, but tolerating TF currently.  Seems to be drinking a fair amount.     LABS  Phos 2.4 (mildly low) - started neutra-phos packet TID    MEDICATIONS  Scopolamine  Miralax  Protonix BID  Creon 24 - 1 to 2 capsules with " "snacks/supplements    ANTHROPOMETRICS  Height: 170.2 cm (5' 7\")  Most Recent Weight: 61.3 kg (135 lb 3.2 oz)    IBW: 67.3 kg  BMI: Normal BMI  Weight History: Weight loss of 18# (12%)  within the last 5 months.   Wt Readings from Last 15 Encounters:   09/09/21 61.3 kg (135 lb 3.2 oz)   08/09/21 63.5 kg (140 lb)   08/05/21 64.4 kg (142 lb)   08/04/21 65 kg (143 lb 4.8 oz)   07/14/21 65.3 kg (144 lb)   06/28/21 65.3 kg (144 lb)   06/04/21 67.1 kg (148 lb)   04/01/21 69.5 kg (153 lb 4.8 oz)   03/26/21 67.1 kg (148 lb)   01/15/21 68 kg (150 lb)   12/18/20 69.9 kg (154 lb)   02/26/20 70.9 kg (156 lb 3.2 oz)   01/31/20 71.6 kg (157 lb 13.6 oz)   09/17/19 70.9 kg (156 lb 3.2 oz)   09/13/19 71.8 kg (158 lb 6.4 oz)   Dosing Weight: 62 kg (actual wt)     ASSESSED NUTRITION NEEDS  Estimated Energy Needs: 1512-6253 kcals/day (25 - 30 kcals/kg)  Justification: Maintenance  Estimated Protein Needs: 74-93 grams protein/day (1.2 - 1.5 grams of pro/kg)  Justification: Repletion  Estimated Fluid Needs: 4836-6075 mL/day (25 - 30 mL/kg)   Justification: Maintenance    PHYSICAL FINDINGS  See malnutrition section below.    MALNUTRITION  % Weight Loss:  > 10% in 5 months (severe malnutrition)  % Intake:  </= 75% for >/= 1 month (severe malnutrition)  Subcutaneous Fat Loss: Did not assess   Muscle Loss: Did not assess  Fluid Accumulation/Edema: None noted  Malnutrition Diagnosis: Severe malnutrition in the context of acute on chronic illness    NUTRITION DIAGNOSIS  Inadequate oral intake related to abdominal discomfort as evidenced by patient with weight loss of 12% in 5 months.      INTERVENTIONS  Implementation  Collaboration with other providers - discussed nutrition recommendations with team  Updated discharge instructions for patient    Goals  Total avg nutritional intake to meet a minimum of 25 kcal/kg and 1.2 g PRO/kg daily (per dosing wt 62 kg).     Monitoring/Evaluation  Progress toward goals will be monitored and evaluated per " protocol.    Antoinette Larios MS, RD, LD  Pager [phone]

## 2021-09-09 NOTE — PLAN OF CARE
"Care  resumed from     Vitals: /61 (BP Location: Right arm)   Pulse 71   Temp 98.2  F (36.8  C) (Oral)   Resp 16   Ht 1.702 m (5' 7\")   Wt 61.3 kg (135 lb 3.2 oz)   SpO2 97%   BMI 21.18 kg/m      Endocrine: n/a  Labs: Stable labs.  Pain: Mild abdominal pain.  PRN's: Oxycodone 5 mg for pain, Ativan for mild nausea.  Diet: Clear liquids, did not eat much, coffee. Tube feeding at 35cc/hr, will advance as patient tolerates. Gtube output now looks like tube feeding, team notified.  LDA: PIV saline locked, G/JT, Gtube to gravity, clamped for meds, Relasorb changed at 1500, next due at 0300.   GI: BM 9/8.  : Mild nausea, no emesis.  Skin: Skin is intact except for G/JT which is tender, patient using heat pad with relief.  Neuro: Pleasant, A&O X4.  Mobility: Up ad koffi, walks often in the halls.  Education: n/a  Plan: Tube feeding decreased to 25cc/hr at 1500, g-tube open to gravity.    "

## 2021-09-09 NOTE — PROGRESS NOTES
This is a recent snapshot of the patient's [facility] medical record.  For current drug dose and complete information and questions, call [phone] or In Basket pool, [facility] home infusion ([id number])  CSN Number:  [id number]

## 2021-09-09 NOTE — PROGRESS NOTES
"GASTROENTEROLOGY PROGRESS NOTE    ASSESSMENT:  56 year old female with medical history of chronic pancreatitis s/p TPIAT, IgG4 liver disease, GERD and recent diagnosis of gastroparesis admitted on 9/7 following elective PEG-J tube placement.     # CP s/p TPAIT  # Gastroparesis s/p PEG-J placement  Uneventful PEG-J placement. Now being followed by IM and RD for post-feeding tube placement care. TF going at 25 ml/hr down from 35 ml/hr given reported G-tube output. Overall feels much better.     RECOMMENDATIONS:  - Appreciate medicine and RD assistance with care.  - Continue TFs as tolerated per RD recommendations  - Oral intake as tolerated  - Analgesics/antiemetics  - Continue holding anticoagulants for additional day  - Disposal per medicine, follow up with Dr. Park in GI clinic in 3-4 weeks     Judit Paz MD  Advanced GI Fellow  #[id number]  _______________________________________________________________  S: Feels better today, pain well controlled now. No N/V. Had TF from G tube and needed adjustment to ongoing rate.     O:  Blood pressure 105/61, pulse 71, temperature 98.2  F (36.8  C), temperature source Oral, resp. rate 16, height 1.702 m (5' 7\"), weight 61.3 kg (135 lb 3.2 oz), SpO2 97 %, not currently breastfeeding.    Gen: NAD, alert  HEENT: No pallor or icterus   CV: RRR, normal S1, S2  Lungs: CTABL  Abd: No distension. BS active in all quadrants. Appropriate tenderness at incision site, no hepatosplenomegaly or masses noted. Site C/D/I  Neuro: Non focal exam  Psych: mentation appears normal., affect and mood normal    LABS:  BMP  Recent Labs   Lab 09/09/21  0836 09/09/21  0613 09/08/21  1645 09/08/21  0605   NA  --  140 140 138   POTASSIUM  --  4.0 3.6 3.8   CHLORIDE  --  105 104 103   KASSI  --  9.2 9.1 9.0   CO2  --  30 30 28   BUN  --  7 6* 8   CR  --  0.62 0.56 0.62   * 149* 108* 124*     CBC  Recent Labs   Lab 09/08/21  0605   WBC 7.0   RBC 4.15   HGB 13.0   HCT 39.5   MCV 95   MCH 31.3   MCHC " 32.9   RDW 13.6        INRNo lab results found in last 7 days.  LFTs  Recent Labs   Lab 09/08/21  0605   ALKPHOS 99   AST 59*   *   BILITOTAL 1.2   PROTTOTAL 6.7*   ALBUMIN 3.4      PANCNo lab results found in last 7 days.

## 2021-09-10 ENCOUNTER — ANESTHESIA EVENT (OUTPATIENT)
Dept: SURGERY | Facility: CLINIC | Age: 56
End: 2021-09-10
Payer: COMMERCIAL

## 2021-09-10 ENCOUNTER — APPOINTMENT (OUTPATIENT)
Dept: GENERAL RADIOLOGY | Facility: CLINIC | Age: 56
End: 2021-09-10
Attending: INTERNAL MEDICINE
Payer: COMMERCIAL

## 2021-09-10 ENCOUNTER — ANESTHESIA (OUTPATIENT)
Dept: SURGERY | Facility: CLINIC | Age: 56
End: 2021-09-10
Payer: COMMERCIAL

## 2021-09-10 LAB
ANION GAP SERPL CALCULATED.3IONS-SCNC: 8 MMOL/L (ref 3–14)
BUN SERPL-MCNC: 8 MG/DL (ref 7–30)
CALCIUM SERPL-MCNC: 9.2 MG/DL (ref 8.5–10.1)
CHLORIDE BLD-SCNC: 104 MMOL/L (ref 94–109)
CO2 SERPL-SCNC: 28 MMOL/L (ref 20–32)
CREAT SERPL-MCNC: 0.66 MG/DL (ref 0.52–1.04)
GFR SERPL CREATININE-BSD FRML MDRD: >90 ML/MIN/1.73M2
GLUCOSE BLD-MCNC: 83 MG/DL (ref 70–99)
GLUCOSE BLDC GLUCOMTR-MCNC: 100 MG/DL (ref 70–99)
GLUCOSE BLDC GLUCOMTR-MCNC: 82 MG/DL (ref 70–99)
GLUCOSE BLDC GLUCOMTR-MCNC: 86 MG/DL (ref 70–99)
HOLD SPECIMEN: NORMAL
MAGNESIUM SERPL-MCNC: 2 MG/DL (ref 1.6–2.3)
PHOSPHATE SERPL-MCNC: 3.7 MG/DL (ref 2.5–4.5)
POTASSIUM BLD-SCNC: 3.8 MMOL/L (ref 3.4–5.3)
SARS-COV-2 RNA RESP QL NAA+PROBE: NEGATIVE
SODIUM SERPL-SCNC: 140 MMOL/L (ref 133–144)
UPPER GI ENDOSCOPY: NORMAL

## 2021-09-10 PROCEDURE — 120N000011 HC R&B TRANSPLANT UMMC

## 2021-09-10 PROCEDURE — 84100 ASSAY OF PHOSPHORUS: CPT | Performed by: PHYSICIAN ASSISTANT

## 2021-09-10 PROCEDURE — 80048 BASIC METABOLIC PNL TOTAL CA: CPT | Performed by: PHYSICIAN ASSISTANT

## 2021-09-10 PROCEDURE — 370N000017 HC ANESTHESIA TECHNICAL FEE, PER MIN: Performed by: INTERNAL MEDICINE

## 2021-09-10 PROCEDURE — 272N000001 HC OR GENERAL SUPPLY STERILE: Performed by: INTERNAL MEDICINE

## 2021-09-10 PROCEDURE — 250N000011 HC RX IP 250 OP 636: Performed by: PHYSICIAN ASSISTANT

## 2021-09-10 PROCEDURE — 999N000179 XR SURGERY CARM FLUORO LESS THAN 5 MIN W STILLS: Mod: TC

## 2021-09-10 PROCEDURE — 0DQ68ZZ REPAIR STOMACH, VIA NATURAL OR ARTIFICIAL OPENING ENDOSCOPIC: ICD-10-PCS | Performed by: INTERNAL MEDICINE

## 2021-09-10 PROCEDURE — 250N000013 HC RX MED GY IP 250 OP 250 PS 637: Performed by: INTERNAL MEDICINE

## 2021-09-10 PROCEDURE — 250N000011 HC RX IP 250 OP 636: Performed by: NURSE ANESTHETIST, CERTIFIED REGISTERED

## 2021-09-10 PROCEDURE — 250N000009 HC RX 250: Performed by: INTERNAL MEDICINE

## 2021-09-10 PROCEDURE — 99232 SBSQ HOSP IP/OBS MODERATE 35: CPT | Performed by: PEDIATRICS

## 2021-09-10 PROCEDURE — 258N000003 HC RX IP 258 OP 636: Performed by: NURSE ANESTHETIST, CERTIFIED REGISTERED

## 2021-09-10 PROCEDURE — 250N000009 HC RX 250: Performed by: NURSE ANESTHETIST, CERTIFIED REGISTERED

## 2021-09-10 PROCEDURE — 360N000082 HC SURGERY LEVEL 2 W/ FLUORO, PER MIN: Performed by: INTERNAL MEDICINE

## 2021-09-10 PROCEDURE — 250N000009 HC RX 250: Performed by: PEDIATRICS

## 2021-09-10 PROCEDURE — 36415 COLL VENOUS BLD VENIPUNCTURE: CPT | Performed by: PHYSICIAN ASSISTANT

## 2021-09-10 PROCEDURE — U0005 INFEC AGEN DETEC AMPLI PROBE: HCPCS | Performed by: INTERNAL MEDICINE

## 2021-09-10 PROCEDURE — 250N000025 HC SEVOFLURANE, PER MIN: Performed by: INTERNAL MEDICINE

## 2021-09-10 PROCEDURE — 999N000141 HC STATISTIC PRE-PROCEDURE NURSING ASSESSMENT: Performed by: INTERNAL MEDICINE

## 2021-09-10 PROCEDURE — 0DHA4UZ INSERTION OF FEEDING DEVICE INTO JEJUNUM, PERCUTANEOUS ENDOSCOPIC APPROACH: ICD-10-PCS | Performed by: INTERNAL MEDICINE

## 2021-09-10 PROCEDURE — C1769 GUIDE WIRE: HCPCS | Performed by: INTERNAL MEDICINE

## 2021-09-10 PROCEDURE — 83735 ASSAY OF MAGNESIUM: CPT | Performed by: PHYSICIAN ASSISTANT

## 2021-09-10 PROCEDURE — 999N000128 HC STATISTIC PERIPHERAL IV START W/O US GUIDANCE

## 2021-09-10 PROCEDURE — 250N000013 HC RX MED GY IP 250 OP 250 PS 637: Performed by: PHYSICIAN ASSISTANT

## 2021-09-10 PROCEDURE — 710N000010 HC RECOVERY PHASE 1, LEVEL 2, PER MIN: Performed by: INTERNAL MEDICINE

## 2021-09-10 RX ORDER — PROPOFOL 10 MG/ML
INJECTION, EMULSION INTRAVENOUS CONTINUOUS PRN
Status: DISCONTINUED | OUTPATIENT
Start: 2021-09-10 | End: 2021-09-10

## 2021-09-10 RX ORDER — SODIUM CHLORIDE, SODIUM LACTATE, POTASSIUM CHLORIDE, CALCIUM CHLORIDE 600; 310; 30; 20 MG/100ML; MG/100ML; MG/100ML; MG/100ML
INJECTION, SOLUTION INTRAVENOUS CONTINUOUS PRN
Status: DISCONTINUED | OUTPATIENT
Start: 2021-09-10 | End: 2021-09-10

## 2021-09-10 RX ORDER — LIDOCAINE 40 MG/G
CREAM TOPICAL
Status: DISCONTINUED | OUTPATIENT
Start: 2021-09-10 | End: 2021-09-12 | Stop reason: HOSPADM

## 2021-09-10 RX ORDER — PROPOFOL 10 MG/ML
INJECTION, EMULSION INTRAVENOUS PRN
Status: DISCONTINUED | OUTPATIENT
Start: 2021-09-10 | End: 2021-09-10

## 2021-09-10 RX ORDER — HYDROMORPHONE HYDROCHLORIDE 1 MG/ML
0.3 INJECTION, SOLUTION INTRAMUSCULAR; INTRAVENOUS; SUBCUTANEOUS
Status: DISCONTINUED | OUTPATIENT
Start: 2021-09-10 | End: 2021-09-12 | Stop reason: HOSPADM

## 2021-09-10 RX ORDER — SODIUM CHLORIDE, SODIUM LACTATE, POTASSIUM CHLORIDE, CALCIUM CHLORIDE 600; 310; 30; 20 MG/100ML; MG/100ML; MG/100ML; MG/100ML
INJECTION, SOLUTION INTRAVENOUS CONTINUOUS
Status: DISCONTINUED | OUTPATIENT
Start: 2021-09-10 | End: 2021-09-10 | Stop reason: HOSPADM

## 2021-09-10 RX ORDER — DIPHENHYDRAMINE HYDROCHLORIDE 50 MG/ML
INJECTION INTRAMUSCULAR; INTRAVENOUS PRN
Status: DISCONTINUED | OUTPATIENT
Start: 2021-09-10 | End: 2021-09-10

## 2021-09-10 RX ORDER — SODIUM CHLORIDE, SODIUM LACTATE, POTASSIUM CHLORIDE, CALCIUM CHLORIDE 600; 310; 30; 20 MG/100ML; MG/100ML; MG/100ML; MG/100ML
INJECTION, SOLUTION INTRAVENOUS CONTINUOUS
Status: DISCONTINUED | OUTPATIENT
Start: 2021-09-10 | End: 2021-09-10

## 2021-09-10 RX ORDER — EPHEDRINE SULFATE 50 MG/ML
INJECTION, SOLUTION INTRAMUSCULAR; INTRAVENOUS; SUBCUTANEOUS PRN
Status: DISCONTINUED | OUTPATIENT
Start: 2021-09-10 | End: 2021-09-10

## 2021-09-10 RX ORDER — FENTANYL CITRATE 50 UG/ML
INJECTION, SOLUTION INTRAMUSCULAR; INTRAVENOUS PRN
Status: DISCONTINUED | OUTPATIENT
Start: 2021-09-10 | End: 2021-09-10

## 2021-09-10 RX ORDER — LIDOCAINE HYDROCHLORIDE 10 MG/ML
INJECTION, SOLUTION INFILTRATION; PERINEURAL PRN
Status: DISCONTINUED | OUTPATIENT
Start: 2021-09-10 | End: 2021-09-10

## 2021-09-10 RX ADMIN — Medication 5 MG: at 10:09

## 2021-09-10 RX ADMIN — CETIRIZINE HYDROCHLORIDE 10 MG: 10 TABLET, FILM COATED ORAL at 07:55

## 2021-09-10 RX ADMIN — PHENYLEPHRINE HYDROCHLORIDE 100 MCG: 10 INJECTION INTRAVENOUS at 09:40

## 2021-09-10 RX ADMIN — PROCHLORPERAZINE EDISYLATE 5 MG: 5 INJECTION INTRAMUSCULAR; INTRAVENOUS at 09:43

## 2021-09-10 RX ADMIN — SCOPALAMINE 1 PATCH: 1 PATCH, EXTENDED RELEASE TRANSDERMAL at 07:57

## 2021-09-10 RX ADMIN — Medication 5 MG: at 09:56

## 2021-09-10 RX ADMIN — MIDAZOLAM 2 MG: 1 INJECTION INTRAMUSCULAR; INTRAVENOUS at 09:29

## 2021-09-10 RX ADMIN — SODIUM CHLORIDE, POTASSIUM CHLORIDE, SODIUM LACTATE AND CALCIUM CHLORIDE: 600; 310; 30; 20 INJECTION, SOLUTION INTRAVENOUS at 09:29

## 2021-09-10 RX ADMIN — ROCURONIUM BROMIDE 50 MG: 10 INJECTION INTRAVENOUS at 09:37

## 2021-09-10 RX ADMIN — ACETAMINOPHEN 650 MG: 325 TABLET, FILM COATED ORAL at 04:12

## 2021-09-10 RX ADMIN — POLYETHYLENE GLYCOL 3350 17 G: 17 POWDER, FOR SOLUTION ORAL at 07:54

## 2021-09-10 RX ADMIN — FENTANYL CITRATE 50 MCG: 50 INJECTION, SOLUTION INTRAMUSCULAR; INTRAVENOUS at 09:36

## 2021-09-10 RX ADMIN — PHENYLEPHRINE HYDROCHLORIDE 50 MCG: 10 INJECTION INTRAVENOUS at 09:36

## 2021-09-10 RX ADMIN — ACARBOSE 50 MG: 25 TABLET ORAL at 16:43

## 2021-09-10 RX ADMIN — PHENYLEPHRINE HYDROCHLORIDE 100 MCG: 10 INJECTION INTRAVENOUS at 10:09

## 2021-09-10 RX ADMIN — DIPHENHYDRAMINE HYDROCHLORIDE 25 MG: 50 INJECTION, SOLUTION INTRAMUSCULAR; INTRAVENOUS at 09:34

## 2021-09-10 RX ADMIN — LIDOCAINE HYDROCHLORIDE 100 MG: 10 INJECTION, SOLUTION INFILTRATION; PERINEURAL at 09:35

## 2021-09-10 RX ADMIN — FAMOTIDINE 20 MG: 20 TABLET, FILM COATED ORAL at 07:54

## 2021-09-10 RX ADMIN — SUGAMMADEX 200 MG: 100 INJECTION, SOLUTION INTRAVENOUS at 10:19

## 2021-09-10 RX ADMIN — PHENYLEPHRINE HYDROCHLORIDE 100 MCG: 10 INJECTION INTRAVENOUS at 09:48

## 2021-09-10 RX ADMIN — DOCUSATE SODIUM 50 MG AND SENNOSIDES 8.6 MG 2 TABLET: 8.6; 5 TABLET, FILM COATED ORAL at 07:54

## 2021-09-10 RX ADMIN — Medication 5 MG: at 09:48

## 2021-09-10 RX ADMIN — PHENYLEPHRINE HYDROCHLORIDE 100 MCG: 10 INJECTION INTRAVENOUS at 09:53

## 2021-09-10 RX ADMIN — AMITRIPTYLINE HYDROCHLORIDE 60 MG: 10 TABLET, FILM COATED ORAL at 21:54

## 2021-09-10 RX ADMIN — MONTELUKAST 10 MG: 10 TABLET, FILM COATED ORAL at 21:55

## 2021-09-10 RX ADMIN — Medication 6 MG: at 21:54

## 2021-09-10 RX ADMIN — LEVOTHYROXINE SODIUM 137 MCG: 0.14 TABLET ORAL at 07:54

## 2021-09-10 RX ADMIN — PROPOFOL 120 MG: 10 INJECTION, EMULSION INTRAVENOUS at 09:36

## 2021-09-10 RX ADMIN — PROCHLORPERAZINE EDISYLATE 5 MG: 5 INJECTION INTRAMUSCULAR; INTRAVENOUS at 10:20

## 2021-09-10 RX ADMIN — PANTOPRAZOLE SODIUM 40 MG: 40 TABLET, DELAYED RELEASE ORAL at 07:55

## 2021-09-10 RX ADMIN — PROPOFOL 175 MCG/KG/MIN: 10 INJECTION, EMULSION INTRAVENOUS at 09:36

## 2021-09-10 RX ADMIN — PHENYLEPHRINE HYDROCHLORIDE 100 MCG: 10 INJECTION INTRAVENOUS at 09:56

## 2021-09-10 RX ADMIN — PANTOPRAZOLE SODIUM 40 MG: 40 TABLET, DELAYED RELEASE ORAL at 20:01

## 2021-09-10 RX ADMIN — HYDROMORPHONE HYDROCHLORIDE 0.3 MG: 1 INJECTION, SOLUTION INTRAMUSCULAR; INTRAVENOUS; SUBCUTANEOUS at 17:39

## 2021-09-10 ASSESSMENT — ACTIVITIES OF DAILY LIVING (ADL)
ADLS_ACUITY_SCORE: 12

## 2021-09-10 ASSESSMENT — MIFFLIN-ST. JEOR: SCORE: 1228.19

## 2021-09-10 ASSESSMENT — ENCOUNTER SYMPTOMS: SEIZURES: 0

## 2021-09-10 ASSESSMENT — LIFESTYLE VARIABLES: TOBACCO_USE: 1

## 2021-09-10 NOTE — PROGRESS NOTES
Cross Cover Note    Cross cover provider contacted by nursing as patient requesting PO Compazine as she takes this at home. Added this to already ordered IV PRN Compazine.     Please contact Medicine provider on call overnight with any additional questions or concerns.     Marielle Johnson PA-C  Hospitalist Service  Pager [phone]

## 2021-09-10 NOTE — ANESTHESIA PROCEDURE NOTES
Airway       Patient location during procedure: OR  Staff -        CRNA: Mela Rodriguez APRN CRNA       Performed By: resident and CRNA  Consent for Airway        Urgency: elective  Indications and Patient Condition       Indications for airway management: andrez-procedural       Induction type:inhalational       Mask difficulty assessment: 1 - vent by mask    Final Airway Details       Final airway type: endotracheal airway       Successful airway: ETT - single  Endotracheal Airway Details        ETT size (mm): 6.5       Cuffed: yes       Successful intubation technique: direct laryngoscopy       DL Blade Type: MAC 3       Grade View of Cords: 2       Adjucts: stylet and other (comment) (endo mouth block)       Position: Right       Measured from: lips       Secured at (cm): 23    Post intubation assessment        Placement verified by: capnometry, equal breath sounds and chest rise        Number of attempts at approach: 1       Secured with: silk tape       Ease of procedure: easy       Dentition: Intact and Unchanged    Additional Comments       Tamiko Aguero DDS (Oral and Maxillofacial Surgery Resident)

## 2021-09-10 NOTE — ANESTHESIA POSTPROCEDURE EVALUATION
Patient: Dinora Mcghee    Procedure(s):  UPPER ENDOSCOPY, REPLACEMENT OF PERCUTANEOUS GASTROJEJUNOSTOMY TUBE, T-TAG GASTROPEXY    Diagnosis:Gastroparesis [K31.84]  Diagnosis Additional Information: No value filed.    Anesthesia Type:  General    Note:  Disposition: Inpatient   Postop Pain Control: Uneventful            Sign Out: Well controlled pain   PONV: No   Neuro/Psych: Uneventful            Sign Out: Acceptable/Baseline neuro status   Airway/Respiratory: Uneventful            Sign Out: Acceptable/Baseline resp. status   CV/Hemodynamics: Uneventful            Sign Out: Acceptable CV status; No obvious hypovolemia; No obvious fluid overload   Other NRE: NONE   DID A NON-ROUTINE EVENT OCCUR? No           Last vitals:  Vitals Value Taken Time   /73 09/10/21 1150   Temp 36.5  C (97.7  F) 09/10/21 1145   Pulse 86 09/10/21 1153   Resp 15 09/10/21 1145   SpO2 100 % 09/10/21 1154   Vitals shown include unvalidated device data.    Electronically Signed By: Maritza Arias MD  September 10, 2021  12:17 PM

## 2021-09-10 NOTE — ANESTHESIA CARE TRANSFER NOTE
Patient: Dinora Mcghee    Procedure(s):  UPPER ENDOSCOPY, REPLACEMENT OF PERCUTANEOUS GASTROJEJUNOSTOMY TUBE, T-TAG GASTROPEXY    Diagnosis: Gastroparesis [K31.84]  Diagnosis Additional Information: No value filed.    Anesthesia Type:   General     Note:    Oropharynx: oropharynx clear of all foreign objects  Level of Consciousness: drowsy  Oxygen Supplementation: nasal cannula  Level of Supplemental Oxygen (L/min / FiO2): 4  Independent Airway: airway patency satisfactory and stable  Dentition: dentition unchanged  Vital Signs Stable: post-procedure vital signs reviewed and stable  Report to RN Given: handoff report given  Patient transferred to: PACU  Comments: Anesthesia Care Transfer Note    Patient: Dinora Mcghee    Transferred to: PACU with supplemental O2    Patient vital signs: stable    Airway: none    Monitors on, VSS, breathing spontaneously, report to RN      Mela Marquis CRNA   9/10/2021 10:44 AM  Handoff Report: Identifed the Patient, Identified the Reponsible Provider, Reviewed the pertinent medical history, Discussed the surgical course, Reviewed Intra-OP anesthesia mangement and issues during anesthesia, Set expectations for post-procedure period and Allowed opportunity for questions and acknowledgement of understanding      Vitals:  Vitals Value Taken Time   BP     Temp     Pulse     Resp     SpO2         Electronically Signed By: NATALY Feliciano CRNA  September 10, 2021  10:43 AM

## 2021-09-10 NOTE — PROGRESS NOTES
[facility]    Medicine Progress Note - Hospitalist Service, Gold 6       Date of Admission:  9/7/2021    Assessment & Plan         Dinora Mcghee is a 56 year old female with PMHx hypothyroidism, DJD, GERD, hx of pituitary adenoma s/p resection, IgG4 pseudotumor of liver, chronic pancreatitis s/p TPAIT, chronic N/V recently diagnosed with gastroparesis with PEG placement admitted on 9/7/2021, following replacement of percutaneous GJ tube for observation, which was complicated by displacement of GJ tube s/p replacement on 9/10.     # S/p percutaneous GJ tube placement   # Gastroparesis   # Severe malnutrition, POA - Patient follows with GI for chronic abdominal pain, nausea, vomiting, and weight loss. Recent gastric emptying showing gastroparesis on 8/24. Please see prior GI outpatient note 8/9/2021. Patient receives IV infusion therapy via port. Had a G-tube, and presents for exchange to GJ, which was successfully placed with the distal end in the mid jejunum on 9/7. Patient developed nausea with reflux and tube feeds backing into G-tube. KUB showing PEGJ tub coiled in the stomach with distal tip at pylorus. GI performed EGD for replacement of PEGJ tube with T-tag gastropexy on 9/10  - ADAT and resume TF after procedure   - Avoid NSAIDs, anti-thrombotic for at least 3 days   - Monitor for refeeding syndrome, Trend BMP, Mag, Phos   - Change dressing once daily   - Pain management: tylenol 650 mg Q6H PRN, IV dilaudid 0.3 mg Q3H PRN   - Continue compazine 5-10 mg PO or IV Q6H PRN, promethazine 25 mg daily PRN, scopolamine patch, PO ativan 0.25 mg Q4H PRN nausea  - Outpatient follow up with Dr. Park 2 weeks after GJ, for discussion of GPOEM and domperidone options   - Nutrition consulted appreciate recs (please see note on 9/7 for details)    - Vital 1.5 kevin (goal @45mL/hr), titrate to goal    - Add Relizorb Q4H instead of creon    - If unable to tolerate PO fluids, will  add 60 mL free water 6x daily (total 800 mL daily)   -Once TF well tolerated at continuous rate of 45 ml/hr, consider attempting advancement to cycled regimen per the following schedule which can be done in outpatient:    -Night 1: Increase to 65 ml/hr x 16 hours    -Night 2: Increase to 75 ml/hr x 14 hours   -Night 3: Increase to 90 ml/hr x 12 hours    #Hypophosphatemia - replete per sliding scale     # Urinary retention, resolved - Developed urinary retention on DOA 1, likely due to pain and medications, which has now resolved.   - Ambulate frequently  - Avoid pain medications and anti-cholinergics if able (uses benadryl for nausea PRN)     # Chronic pancreatitis s/p TPAIT  - Transitioning to Relizorb via TF, will discontinue creon. Will send message to patient's transplant coordinator and TPAIT team about how to dose acarbose on continuous TF.  - Hold acarbose while getting continuous tube feeds per Dr. Melissa.     # Hx of IgG4 pseudotumor - Work up with elevated LFTs with mildly elevated IgG4. Liver lesion previously biopsied with noticeable proliferation and acute and chronic inflammation and fibrosis of liver with focally increased IgG4-positive plasma cells consistent with IgG4 pseudotumor. Followed by hepatology. LFTs at baseline.   - Outpatient follow up with GI     # Chronic constipation -Continue PTA motegrity 2 mg daily, Miralax daily,  Senna-colace 1-2 tabs BID,     # GERD - Continue famotidine 20 mg BID, omeprazole 40 mg BID     # RAD, allergic rhinitis  - Azelastine 0.1% nasal spray, 1 spray BID, Cetrizine 10 mg daily, benadryl 25 mg Q6H PRN, and montelukast 10 mg daily.     # Hypothyroidism - TSH 2.41 on 9/14/2020. Continue PTA Levothyroxine 137 mcg daily     # Hx of migraine HA - Continue PTA Amitriptyline 60 mg QHS and Zolmitriptan 5 mg as needed at onset of headache        Diet: Adult Formula Drip Feeding: Continuous Vital 1.5; Jejunostomy; Goal Rate: 45; mL/hr; Medication - Feeding Tube Flush  Frequency: At least 15-30 mL water before and after medication administration and with tube clogging; Amount to Send (Nutrition us...  Advance Diet as Tolerated: Clear Liquid Diet    DVT Prophylaxis: Anti-embolisim stockings (TEDs)  Piedra Catheter: Not present  Central Lines: None  Code Status: Full Code      Disposition Plan   Expected discharge: 9/12/21  recommended to prior living arrangement once adequate pain management/ tolerating PO medications and tolerating tube feeds .     The patient's care was discussed with the Attending Physician, Dr. Levon Nair, Bedside Nurse, Patient, Patient's Family and GI Consultant.    Laina Mirza PA-C  Hospitalist Service, [address]  Securely message with the Vocera Web Console (learn more here)  Text page via [employer] Paging/Directory  Please see sign in/sign out for up to date coverage information    Clinically Significant Risk Factors Present on Admission           # Severe Malnutrition, POA: based on Weight loss;Reduced intake (09/09/21 1400)     ______________________________________________________________________    Interval History    Overnight events: KUB yesterday evening with PEGJ displaced. GI took patient to OR today for replacement.     Patient states she is having some mild pain at PEGJ tube site, but better than the initial placement. Feeling better, with improved nausea since procedure. Denies any F/C, CP, SOB, or urinary symptoms.     Data reviewed today: I reviewed all medications, new labs and imaging results over the last 24 hours.    Physical Exam   Vital Signs: Temp: 97.7  F (36.5  C) Temp src: Oral BP: 108/72 Pulse: 79   Resp: 16 SpO2: 97 % O2 Device: Nasal cannula Oxygen Delivery: 4 LPM  Weight: 132 lbs 6.4 oz  GENERAL: Alert and oriented x 3. NAD. Pleasant and conversational   HEENT: AT/NC. Anicteric sclera. Mucous membranes moist   CARDIOVASCULAR: RRR. S1, S2. No murmurs, rubs, or gallops.    RESPIRATORY: Effort normal on RA. Clear to auscultation bilaterally, no rales, rhonchi or wheezes, respirations unlabored   GI: Abdomen soft, minimal tenderness at PEGJ site in epigastrium of abdomen without rebound or guarding, normoactive bowel sounds present  EXTREMITIES: No peripheral edema.   NEUROLOGICAL: CN II-XII grossly intact. Moving all extremities symmetrically.   SKIN: Intact. Warm and dry. No jaundice.     Data   Recent Labs   Lab 09/10/21  1134 09/10/21  0601 09/10/21  0334 09/09/21  0613 09/08/21  1645 09/08/21  1645 09/08/21  0605   WBC  --   --   --   --   --   --  7.0   HGB  --   --   --   --   --   --  13.0   MCV  --   --   --   --   --   --  95   PLT  --   --   --   --   --   --  303   NA  --  140  --  140  --  140 138   POTASSIUM  --  3.8  --  4.0  --  3.6 3.8   CHLORIDE  --  104  --  105  --  104 103   CO2  --  28  --  30  --  30 28   BUN  --  8  --  7  --  6* 8   CR  --  0.66  --  0.62  --  0.56 0.62   ANIONGAP  --  8  --  5  --  6 7   KASSI  --  9.2  --  9.2  --  9.1 9.0   GLC 82 83 86 149*   < > 108* 124*   ALBUMIN  --   --   --   --   --   --  3.4   PROTTOTAL  --   --   --   --   --   --  6.7*   BILITOTAL  --   --   --   --   --   --  1.2   ALKPHOS  --   --   --   --   --   --  99   ALT  --   --   --   --   --   --  126*   AST  --   --   --   --   --   --  59*    < > = values in this interval not displayed.     Recent Results (from the past 24 hour(s))   XR Abdomen Port 1 View    Narrative    EXAM: XR ABDOMEN PORT 1 VIEWS  9/9/2021 10:11 PM      HISTORY: please evaluate proper PEG placement - having output from G  tube    COMPARISON: CT 8/24/2021    FINDINGS: Percutaneous gastrostomy tube coils within the stomach with  distal tip at the pylorus/proximal duodenum.    Nonobstructive bowel gas pattern. No pneumatosis. No portal venous  gas. No acute osseous abnormality. Multiple clips projecting over the  upper abdomen.      Impression    IMPRESSION: Percutaneous gastrostomy tube coils within  the stomach  with distal tip at the pylorus/proximal duodenum.      I have personally reviewed the examination and initial interpretation  and I agree with the findings.    CATIE JOHNSON MD         SYSTEM ID:  [id number]   XR Surgery GAMA L/T 5 Min Fluoro w Stills    Narrative    This exam was marked as non-reportable because it will not be read by a   radiologist or a [place] non-radiologist provider.           Medications       amitriptyline  60 mg Oral At Bedtime     cetirizine  10 mg Oral Daily     famotidine  20 mg Oral QAM     levothyroxine  137 mcg Oral QAM     montelukast  10 mg Oral At Bedtime     pantoprazole  40 mg Oral BID     polyethylene glycol  17 g Oral QAM     scopolamine  1 patch Transdermal Q72H     scopolamine   Transdermal Q8H     senna-docusate  1-2 tablet Oral QAM     sodium chloride (PF)  3 mL Intracatheter Q8H

## 2021-09-10 NOTE — OP NOTE
Upper GI Endoscopy 09/10/2021  9:33 AM [address] [place] [address] [phone]     Endoscopy Department   _______________________________________________________________________________   Patient Name: Dinora Mcghee           Procedure Date: 9/10/2021 9:33 AM   MRN: [id number]                       Account Number: [id number]   YOB: 1965              Admit Type: Inpatient   Age: 56                                Gender: Female   Note Status: Finalized                Attending MD: Zackery Montoya MD   Total Sedation Time:                     _______________________________________________________________________________       Procedure:           Upper GI endoscopy   Indications:         Replace PEG-J tube   Providers:           Zackery Montyoa MD   Patient Profile:     Ms Mcghee is a 57yo woman post TPIAT requiring a GJ                        placed a few days back. With the tube found curled in                        the stomach she now returns for replacement by                        endoscopic and fluoroscopy.   Referring MD:        Mandeep Park MD   Medicines:           General Anesthesia   Complications:       No immediate complications.   _______________________________________________________________________________   Procedure:           Pre-Anesthesia Assessment:                        - Prior to the procedure, a History and Physical was                        performed, and patient medications and allergies were                        reviewed. The patient is competent. The risks and                        benefits of the procedure and the sedation options and                        risks were discussed with the patient. All questions                        were answered and informed consent was obtained. Patient                        identification and proposed procedure were verified by                         the nurse in the pre-procedure area. Mental Status                        Examination: alert and oriented. Airway Examination:                        Mallampati Class II (the uvula but not tonsillar pillars                        visualized). Respiratory Examination: clear to                        auscultation. CV Examination: normal. ASA Grade                        Assessment: III - A patient with severe systemic                        disease. After reviewing the risks and benefits, the                        patient was deemed in satisfactory condition to undergo                        the procedure. The anesthesia plan was to use general                        anesthesia. Immediately prior to administration of                        medications, the patient was re-assessed for adequacy to                        receive sedatives. The heart rate, respiratory rate,                        oxygen saturations, blood pressure, adequacy of                        pulmonary ventilation, and response to care were                        monitored throughout the procedure. The physical status                        of the patient was re-assessed after the procedure.                        After obtaining informed consent, the endoscope was                        passed under direct vision. Throughout the procedure,                        the patient's blood pressure, pulse, and oxygen                        saturations were monitored continuously. The pediatric                        gastroscope was introduced through the gastrostomy                        andmouth, and advanced to the proximal jejunum. The                        upper GI endoscopy was accomplished without difficulty.                        The patient tolerated the procedure well.                                                                                     Findings:         films demonstrated the in situ GJ tube curled in the stomach. The  "       pediatric gastroscope was passed per mouth demonstrating an unremarkable        esophagus. The stomach was notable for a well seated balloon on the        anterior wall and that the stomach was overall mildly distorted. We then        passed a single T tag for supported gastropexy under endoscopic guidance        along the fresh gastrostomy tract. The malpositioned gastrojejunostomy        tube was removed and the pediatric gastroscope passed across a maturing        tract. The duodenjejunostomy was healthy and patent and the proximal        jejunum was found unremarkable through a healthy and patent end to side        jejunojejunostomy. The feeding limb was identified and an 0.035\"        Visiglide wire was exchanged with the tip in the efferent limb and a        weighted gastrojejunostomy tube positioned without gastric curl and tip        in the efferent limb.                                                                                     Impression:          - Uncomplicated adjunct T tag gastropexy with subsequent                        replacment of the malpositioned tube with a weighted GJ                        one piece device                        - Grossly healthy and patent appearing TPIAT anatomy                        with DJ and JJ   Recommendation:      - General anesthesia recovery with return to the floor                        when appropriate                        - GJ may be used as previous without delay; oral intake                        as tolerated may resume as well as medications and                        activity on return to the floor                        - Follow up as scheduled with the TPIAT team                        - The findings and recommendations were discussed with                        the patient and their family                                                                                       electronically signed by ANDREI Montoya        "

## 2021-09-10 NOTE — PROVIDER NOTIFICATION
Provider paged;  75mL out of G, after 75 into J; Increased nausea and pain, pt burping up formula; Can we Xray or something to check placement.     Abdominal Xray ordered    Provider called back;   -J tube is not positional  -TF stopped  -G to gravity  -Will make a plan w/ IR in the am    Yoselyn Pino RN on 9/9/2021 at 11:20 PM

## 2021-09-10 NOTE — ANESTHESIA PREPROCEDURE EVALUATION
Anesthesia Pre-Procedure Evaluation    Patient: Dinora Mcghee   MRN: [id number] : 1965        Preoperative Diagnosis: Adult failure to thrive [R62.7]   Procedure : Procedure(s):  INSERTION, PEG TUBE      Past Medical History:   Diagnosis Date     Allergic rhinitis, cause unspecified      Allergy to other foods     strawberries, apples, celeries, alice, watermelon     Arthritis     left knee     Choledocholithiasis     long after cholecystectomy     Chronic abdominal pain      Chronic constipation      Chronic nausea      Chronic pancreatitis (H)      Degeneration of lumbar or lumbosacral intervertebral disc      Esophageal reflux     w/ hiatal hernia     Gastroparesis      Hiatal hernia      History of pituitary adenoma     s/p resection     Hypothyroidism      Migraines      Mild hyperlipidemia      On tube feeding diet     presence of GJ tube     Pancreatic disease     PD stricture, 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 endometriosis growth     APPENDECTOMY       C  DELIVERY ONLY       C  DELIVERY ONLY      repeat c section with incidental cystotomy with repair     C EXCIS PITUITARY,TRANSNASAL/SEPTAL      pituitary tumor removed for diabetes insipidus     C TOTAL ABDOM HYSTERECTOMY      w/ bilateral salpingoophorectomy       SECTION       COLONOSCOPY       ENDOSCOPIC RETROGRADE CHOLANGIOPANCREATOGRAM N/A 2015    Procedure: ENDOSCOPIC RETROGRADE CHOLANGIOPANCREATOGRAM;  Surgeon: Mandeep Park MD;  [facility]: [facility]     ENDOSCOPIC RETROGRADE CHOLANGIOPANCREATOGRAM N/A 2016    Procedure: COMBINED ENDOSCOPIC RETROGRADE CHOLANGIOPANCREATOGRAPHY, PLACE TUBE/STENT;  Surgeon: Mandeep Park MD;   [facility]     ENDOSCOPIC RETROGRADE CHOLANGIOPANCREATOGRAM N/A 3/17/2016    Procedure: COMBINED ENDOSCOPIC RETROGRADE CHOLANGIOPANCREATOGRAPHY, REMOVE FOREIGN BODY OR STENT/TUBE;  Surgeon: Mandeep Park MD;  [facility]     ENDOSCOPIC RETROGRADE CHOLANGIOPANCREATOGRAM N/A 8/2/2016    Procedure: COMBINED ENDOSCOPIC RETROGRADE CHOLANGIOPANCREATOGRAPHY, PLACE TUBE/STENT;  Surgeon: Mandeep Park MD;  [facility]     ENDOSCOPIC RETROGRADE CHOLANGIOPANCREATOGRAM N/A 8/26/2016    Procedure: COMBINED ENDOSCOPIC RETROGRADE CHOLANGIOPANCREATOGRAPHY, REMOVE FOREIGN BODY OR STENT/TUBE;  Surgeon: Mandeep Park MD;  [facility]     ENDOSCOPIC ULTRASOUND UPPER GASTROINTESTINAL TRACT (GI) N/A 10/3/2016    Procedure: ENDOSCOPIC ULTRASOUND, ESOPHAGOSCOPY / UPPER GASTROINTESTINAL TRACT (GI);  Surgeon: Guru Jose Rodas MD;  [facility]     ESOPHAGOSCOPY, GASTROSCOPY, DUODENOSCOPY (EGD), COMBINED N/A 6/24/2015    Procedure: COMBINED ESOPHAGOSCOPY, GASTROSCOPY, DUODENOSCOPY (EGD), REMOVE FOREIGN BODY;  Surgeon: Mandeep Park MD;  [facility]     ESOPHAGOSCOPY, GASTROSCOPY, DUODENOSCOPY (EGD), COMBINED N/A 10/25/2015    Procedure: COMBINED ESOPHAGOSCOPY, GASTROSCOPY, DUODENOSCOPY (EGD);  Surgeon: Sammy Amaro MD;  [facility]     ESOPHAGOSCOPY, GASTROSCOPY, DUODENOSCOPY (EGD), COMBINED N/A 10/25/2015    Procedure: COMBINED ESOPHAGOSCOPY, GASTROSCOPY, DUODENOSCOPY (EGD), BIOPSY SINGLE OR MULTIPLE;  Surgeon: Sammy Amaro MD;  [facility]     ESOPHAGOSCOPY, GASTROSCOPY, DUODENOSCOPY (EGD), DILATATION, COMBINED       EXCISE LESION TRUNK N/A 4/17/2017    Procedure: EXCISE LESION TRUNK;  Removal of Abdominal Foreign Body;  Surgeon: Nestor Phoenix MD;  [facility]     HC ESOPH/GAS REFLUX TEST W NASAL IMPED >1 HR N/A 11/19/2015    Procedure: ESOPHAGEAL IMPEDENCE FUNCTION TEST WITH 24 HOUR PH GREATER THAN 1 HOUR;  Surgeon: George Flores  Thiago SCHILLING MD;  Location: [facility]     HC UGI ENDOSCOPY DIAG W BIOPSY  9/17/08     HC UGI ENDOSCOPY DIAG W BIOPSY  9/27/12     HC UGI ENDOSCOPY W ESOPHAGEAL DILATION BALLOON <30MM  9/17/08     HC UGI ENDOSCOPY W EUS N/A 5/5/2015    Procedure: COMBINED ENDOSCOPIC ULTRASOUND, ESOPHAGOSCOPY, GASTROSCOPY, DUODENOSCOPY (EGD);  Surgeon: Wm Dueñas MD;  [facility]     HC WRIST ARTHROSCOP,RELEASE XVERS LIG Bilateral 12/17/08     INJECT TRANSVERSUS ABDOMINIS PLANE (TAP) BLOCK BILATERAL Left 9/22/2016    Procedure: INJECT TRANSVERSUS ABDOMINIS PLANE (TAP) BLOCK BILATERAL;  Surgeon: Dickson Corrigan MD;  [facility]     laparoscopic pineda  1995     LAPAROSCOPIC HERNIORRHAPHY INCISIONAL N/A 8/23/2018    Procedure: LAPAROSCOPIC HERNIORRHAPHY INCISIONAL;  Laparoscopic Incisional Hernia Repair with Symbotex Mesh Implant;  Surgeon: Nestor Phoenix MD;  [facility]     LAPAROSCOPIC PANCREATECTOMY, TRANSPLANT AUTO ISLET CELL N/A 12/28/2016    Procedure: LAPAROSCOPIC PANCREATECTOMY, TRANSPLANT AUTO ISLET CELL;  Surgeon: Nestor Phoenix MD;  [facility]     REPLACE GASTROJEJUNOSTOMY TUBE, PERCUTANEOUS N/A 9/7/2021    Procedure: GASTROJEJUNOSTOMY TUBE PLACEMENT, PERCUTANEOUS, WITH GASTROPEXY;  Surgeon: Mandeep Park MD;  [facility]     transphenoidal pituitary resection  1990      Allergies   Allergen Reactions     Apple Anaphylaxis     Corticosteroids Other (See Comments)     All oral, IV and injectable steroids are contraindicated (unless in life threatening situations) in [facility] transplant recipients. They can cause irreversible loss of islet cell function. Please contact patient's transplant care coordinator ADI Gaffney RN at [phone]/pager [phone] and/or endocrinologist prior to administration.       Depakote [Divalproex Sodium] Other (See Comments)     Chest pain     Zithromax [Azithromycin Dihydrate] Anaphylaxis     Noted in 4/7/08 ER     Darvocet [Propoxyphene N-Apap] Itching      Morphine Nausea and Vomiting and Rash     Nalbuphine Hcl Rash     RASH :nubaine     Zosyn [Piperacillin-Tazobactam In D5w] Rash     Possible allergy, did have a diffuse rash that seemed drug related but could have also been related to soap in the hospital.      Bactrim [Sulfamethoxazole W-Trimethoprim] Other (See Comments) and Nausea and Vomiting     Severely low liver function.     Cats      Compazine [Prochlorperazine] Other (See Comments)     Twitching. Takes Benedryl and is fine     Dust Mites      Grass      Ragweeds      Tape [Adhesive Tape] Blisters     Tramadol      Trees      Zofran [Ondansetron] Other (See Comments)     migraine     Flagyl [Metronidazole] Hives and Rash      Social History     Tobacco Use     Smoking status: Former Smoker     Packs/day: 0.50     Years: 6.00     Pack years: 3.00     Types: Cigarettes     Start date: 1985     Quit date: 1992     Years since quittin.7     Smokeless tobacco: Never Used     Tobacco comment: no 2nd hand   Substance Use Topics     Alcohol use: Not Currently     Alcohol/week: 3.0 - 6.0 standard drinks     Types: 1 - 2 Glasses of wine, 1 - 2 Cans of beer, 1 - 2 Shots of liquor per week     Comment: none since IGG      Wt Readings from Last 1 Encounters:   09/10/21 60.1 kg (132 lb 6.4 oz)        Anesthesia Evaluation   Pt has had prior anesthetic. Type: General.    History of anesthetic complications  - PONV.  (responds well to scopolamine patch).    ROS/MED HX  ENT/Pulmonary: Comment: 3 pk yr hx, quit     (+) allergic rhinitis, tobacco use, Past use,  (-) asthma and sleep apnea   Neurologic:     (+) migraines,  (-) no seizures and no CVA   Cardiovascular:     (+) -----Previous cardiac testing   Echo: Date: Results:    Stress Test: Date: Results:    ECG Reviewed: Date: 21 Results:  Sinus rhythm  Cannot rule out Anterior infarct , age undetermined  Abnormal ECG   Ventricular rate 66 bpm     Cath: Date: Results:      METS/Exercise Tolerance: 4  - Raking leaves, gardening Comment: Endorses SOB when carrying laundry upstairs. Denies CP. +Fatigue, less stamina 2/2 malnutrition     Hematologic:  - neg hematologic  ROS  (-) history of blood clots and history of blood transfusion   Musculoskeletal: Comment: lumbago, chrondromalacia, stiff shoulder  - neg musculoskeletal ROS     GI/Hepatic: Comment: Hx hepatic dome lesion/IgG4 pseudotumor and elevated LFT's of unclear etiology    Chronic pancreatitis    Severe gastroparesis    Malnutrition, wt loss  Hiatal hernia    Abdominal XR  9/9 showing the percutaneous gastrostomy tube is coiled within the stomach with distal tip at pylorus/proximal duodenum. This is notable for misplacement    (+) GERD, Asymptomatic on medication, hiatal hernia,     Renal/Genitourinary:  - neg Renal ROS     Endo: Comment: post-pancreatectomy diabetes; pancreatic insufficiency.     (+) thyroid problem, hypothyroidism,  (-) Type II DM   Psychiatric/Substance Use:  - neg psychiatric ROS     Infectious Disease:  - neg infectious disease ROS     Malignancy:  - neg malignancy ROS     Other:  - neg other ROS          Physical Exam    Airway      Comment: Previous grade I view with Mac 3. 6.5 ETT passed x1 attempt    Mallampati: II   TM distance: > 3 FB   Neck ROM: full   Mouth opening: > 3 cm    Respiratory Devices and Support         Dental           Cardiovascular          Rhythm and rate: regular and normal     Pulmonary   pulmonary exam normal        breath sounds clear to auscultation           OUTSIDE LABS:  CBC:   Lab Results   Component Value Date    WBC 7.0 09/08/2021    WBC 5.7 08/07/2021    HGB 13.0 09/08/2021    HGB 13.4 08/07/2021    HCT 39.5 09/08/2021    HCT 40.3 08/07/2021     09/08/2021     08/07/2021     BMP:   Lab Results   Component Value Date     09/10/2021     09/09/2021    POTASSIUM 3.8 09/10/2021    POTASSIUM 4.0 09/09/2021    CHLORIDE 104 09/10/2021    CHLORIDE 105 09/09/2021    CO2 28 09/10/2021     CO2 30 09/09/2021    BUN 8 09/10/2021    BUN 7 09/09/2021    CR 0.66 09/10/2021    CR 0.62 09/09/2021    GLC 83 09/10/2021    GLC 86 09/10/2021     COAGS:   Lab Results   Component Value Date    PTT 29 01/07/2017    INR 1.13 07/20/2021    FIBR 225 12/28/2016     POC:   Lab Results   Component Value Date    BGM 85 08/24/2018    HCG Negative 12/19/2016     HEPATIC:   Lab Results   Component Value Date    ALBUMIN 3.4 09/08/2021    PROTTOTAL 6.7 (L) 09/08/2021     (H) 09/08/2021    AST 59 (H) 09/08/2021    ALKPHOS 99 09/08/2021    BILITOTAL 1.2 09/08/2021     OTHER:   Lab Results   Component Value Date    PH 7.49 (H) 12/28/2016    LACT 0.6 (L) 12/30/2016    A1C 5.4 08/05/2021    KASSI 9.2 09/10/2021    PHOS 3.7 09/10/2021    MAG 2.0 09/10/2021    LIPASE <10 (L) 08/07/2021    AMYLASE 45 01/23/2017    TSH 2.41 09/14/2020    T4 1.13 03/23/2018    CRP 90.0 (H) 12/29/2016    SED 11 10/23/2012       Anesthesia Plan    ASA Status:  3   NPO Status:  NPO Appropriate    Anesthesia Type: General.     - Airway: ETT   Induction: RSI, Intravenous.   Maintenance: Balanced.   Techniques and Equipment:     - Lines/Monitors: BIS     Consents    Anesthesia Plan(s) and associated risks, benefits, and realistic alternatives discussed. Questions answered and patient/representative(s) expressed understanding.     - Discussed with:  Patient         Postoperative Care    Pain management: Multi-modal analgesia, IV analgesics.   PONV prophylaxis: Ondansetron (or other 5HT-3), Scopolamine patch, Background Propofol Infusion     Comments:                PAC Discussion and Assessment    ASA Classification: 3  Case is suitable for: [place]  Anesthetic techniques and relevant risks discussed: GA  Invasive monitoring and risk discussed: No    Possibility and Risk of blood transfusion discussed: No            PAC Resident/NP Anesthesia Assessment: Dinora Mcghee is a 55 y/o female who presents for pre-operative H&P in preparation for  REPLACEMENT, GASTROJEJUNOSTOMY TUBE, PERCUTANEOUS with Mandeep Park MD on 9/7/21 at [facility] for treatment of Adult failure to thrive    Pt has had prior anesthetic.     History of anesthetic complications:  PONV  (responds well to scopolamine patch).    She has the following specific operative considerations:   # YOLY 1/8 = low risk  # VTE risk: 0.26%  # Risk of PONV score = 3.  If > 2, anti-emetic intervention recommended.  # Anesthesia considerations:  Refer to PAC assessment in anesthesia records    # Increased risk of postoperative nausea/vomiting: Recommend use of antiemetic agents in the perioperative period.        CARDIAC: METS 4 -  Endorses SOB when carrying laundry upstairs. Denies CP. +Fatigue, less stamina 2/2 malnutrition     # RCRI : No serious cardiac risks.  0.4% risk of major adverse cardiac event.      PULMONARY:     # Former smoker, 3 pk yr hx, quit 1992    # Allergic rhinitis    # Denies asthma or inhaler use    GI:     # GERD, asymptomatic on prilosec     # Hx hepatic dome lesion/IgG4 pseudotumor and elevated LFT's of unclear etiology    # Chronic pancreatitis    ENDO: BMI 22    # hypothyroidism    # No DM    HEME:     # Has portacath    NEURO/PSYCH:     # Migraines      Patient is optimized and is acceptable candidate for the proposed procedure. No further diagnostic evaluation is needed.    ** Patient's visit was done virtually today.  A full physical exam was not completed.  Please refer to the physical examination documented by the anesthesiologist in the anesthesia record on the day of surgery. **    Final plan per anesthesiologist on day of surgery.       Reviewed and Signed by PAC Mid-Level Provider/Resident  Mid-Level Provider/Resident: Anitra Lr PA-C  Date: 8/31/21  Time: 1156                               Maritza Arias MD

## 2021-09-10 NOTE — PROGRESS NOTES
Cross Cover Note    Cross cover provider contacted by nursing as patient having reflux of tube feeds out her G-tube. Had been started on TF at 25cc/hr through J port- and then when the G-tube was vented for nausea, about 75cc came out of G tube. Abdominal XR obtained showing the percutaneous gastrostomy tube is coiled within the stomach with distal tip at pylorus/proximal duodenum. This is notable for misplacement. Instructed RN to hold tube feeds overnight, and dayteam to discuss with GI in AM regarding repositioning. BG checks every 4 hours overnight. Gave one time dose of IV dilaudid 0.3mg once for discomfort when laying flat for XR.    Please contact Medicine provider on call overnight with any additional questions or concerns.     Marielle Johnson PA-C  Hospitalist Service  Pager [phone]

## 2021-09-10 NOTE — PLAN OF CARE
"/67 (BP Location: Right arm)   Pulse 68   Temp 97.8  F (36.6  C) (Oral)   Resp 10   Ht 1.702 m (5' 7\")   Wt 60.1 kg (132 lb 6.4 oz)   SpO2 99%   BMI 20.74 kg/m      Shift: 4199-7304  VS: VSS on RA, afebrile  Neuro: AOx4  BG: Q4, has dexcom to monitor as well.  Labs: AM labs pending.  Respiratory: WDL.  Pain/Nausea/PRN: PRN tylenol x1, PRN ativan, oxy, compazine, phenergan available.  Diet: regular.  Labs: on nursing Phos. Protocol only received 2/3 packets, did not receive the last packet due to nausea.   LDA: PIV SL, G to gravity, clamped for meds ; J clamped due to positional problems.  GI/: Pt reported sm BM this AM, no BM this shift, TF on hold due to PEG in wrong place, team is aware and plan to come AM of 9/10 ; voided once in bathroom (WENDI), bladder scan 123mL.  Skin: G/J site tender/red, pt using heating pad for relief.  Mobility: UAL, SBA.  Plan: Watch for plan for re-place of PEG tube. Continue to monitor patient and update MD of any changes.     Handoff given to following RN.    "

## 2021-09-11 LAB
ANION GAP SERPL CALCULATED.3IONS-SCNC: 8 MMOL/L (ref 3–14)
BUN SERPL-MCNC: 8 MG/DL (ref 7–30)
CALCIUM SERPL-MCNC: 9 MG/DL (ref 8.5–10.1)
CHLORIDE BLD-SCNC: 104 MMOL/L (ref 94–109)
CO2 SERPL-SCNC: 28 MMOL/L (ref 20–32)
CREAT SERPL-MCNC: 0.6 MG/DL (ref 0.52–1.04)
GFR SERPL CREATININE-BSD FRML MDRD: >90 ML/MIN/1.73M2
GLUCOSE BLD-MCNC: 115 MG/DL (ref 70–99)
GLUCOSE BLDC GLUCOMTR-MCNC: 112 MG/DL (ref 70–99)
GLUCOSE BLDC GLUCOMTR-MCNC: 115 MG/DL (ref 70–99)
GLUCOSE BLDC GLUCOMTR-MCNC: 144 MG/DL (ref 70–99)
GLUCOSE BLDC GLUCOMTR-MCNC: 147 MG/DL (ref 70–99)
HOLD SPECIMEN: NORMAL
MAGNESIUM SERPL-MCNC: 2 MG/DL (ref 1.6–2.3)
PHOSPHATE SERPL-MCNC: 4.2 MG/DL (ref 2.5–4.5)
POTASSIUM BLD-SCNC: 3.9 MMOL/L (ref 3.4–5.3)
SODIUM SERPL-SCNC: 140 MMOL/L (ref 133–144)

## 2021-09-11 PROCEDURE — 250N000013 HC RX MED GY IP 250 OP 250 PS 637: Performed by: INTERNAL MEDICINE

## 2021-09-11 PROCEDURE — 36415 COLL VENOUS BLD VENIPUNCTURE: CPT | Performed by: PHYSICIAN ASSISTANT

## 2021-09-11 PROCEDURE — 99232 SBSQ HOSP IP/OBS MODERATE 35: CPT | Performed by: PEDIATRICS

## 2021-09-11 PROCEDURE — 120N000011 HC R&B TRANSPLANT UMMC

## 2021-09-11 PROCEDURE — 84100 ASSAY OF PHOSPHORUS: CPT | Performed by: PHYSICIAN ASSISTANT

## 2021-09-11 PROCEDURE — 250N000011 HC RX IP 250 OP 636: Performed by: PHYSICIAN ASSISTANT

## 2021-09-11 PROCEDURE — 83735 ASSAY OF MAGNESIUM: CPT | Performed by: PHYSICIAN ASSISTANT

## 2021-09-11 PROCEDURE — 80048 BASIC METABOLIC PNL TOTAL CA: CPT | Performed by: PHYSICIAN ASSISTANT

## 2021-09-11 PROCEDURE — 250N000013 HC RX MED GY IP 250 OP 250 PS 637: Performed by: PEDIATRICS

## 2021-09-11 RX ORDER — CYCLOBENZAPRINE HCL 5 MG
5 TABLET ORAL 3 TIMES DAILY
Status: DISCONTINUED | OUTPATIENT
Start: 2021-09-11 | End: 2021-09-12 | Stop reason: HOSPADM

## 2021-09-11 RX ADMIN — PROCHLORPERAZINE MALEATE 10 MG: 5 TABLET ORAL at 07:18

## 2021-09-11 RX ADMIN — MONTELUKAST 10 MG: 10 TABLET, FILM COATED ORAL at 19:27

## 2021-09-11 RX ADMIN — ACETAMINOPHEN 650 MG: 325 TABLET, FILM COATED ORAL at 11:27

## 2021-09-11 RX ADMIN — CETIRIZINE HYDROCHLORIDE 10 MG: 10 TABLET, FILM COATED ORAL at 07:26

## 2021-09-11 RX ADMIN — DIPHENHYDRAMINE HYDROCHLORIDE 25 MG: 25 CAPSULE ORAL at 19:26

## 2021-09-11 RX ADMIN — OXYCODONE HYDROCHLORIDE 5 MG: 5 TABLET ORAL at 16:52

## 2021-09-11 RX ADMIN — PROCHLORPERAZINE MALEATE 10 MG: 5 TABLET ORAL at 19:26

## 2021-09-11 RX ADMIN — HYDROMORPHONE HYDROCHLORIDE 0.3 MG: 1 INJECTION, SOLUTION INTRAMUSCULAR; INTRAVENOUS; SUBCUTANEOUS at 03:46

## 2021-09-11 RX ADMIN — DIPHENHYDRAMINE HYDROCHLORIDE 25 MG: 25 CAPSULE ORAL at 07:18

## 2021-09-11 RX ADMIN — Medication 6 MG: at 23:11

## 2021-09-11 RX ADMIN — AMITRIPTYLINE HYDROCHLORIDE 60 MG: 10 TABLET, FILM COATED ORAL at 19:48

## 2021-09-11 RX ADMIN — PANTOPRAZOLE SODIUM 40 MG: 40 TABLET, DELAYED RELEASE ORAL at 19:26

## 2021-09-11 RX ADMIN — OXYCODONE HYDROCHLORIDE 5 MG: 5 TABLET ORAL at 00:00

## 2021-09-11 RX ADMIN — Medication 0.25 MG: at 23:11

## 2021-09-11 RX ADMIN — FAMOTIDINE 20 MG: 20 TABLET, FILM COATED ORAL at 07:26

## 2021-09-11 RX ADMIN — CYCLOBENZAPRINE HYDROCHLORIDE 5 MG: 5 TABLET, FILM COATED ORAL at 19:49

## 2021-09-11 RX ADMIN — LEVOTHYROXINE SODIUM 137 MCG: 0.14 TABLET ORAL at 07:26

## 2021-09-11 ASSESSMENT — ACTIVITIES OF DAILY LIVING (ADL)
ADLS_ACUITY_SCORE: 12

## 2021-09-11 ASSESSMENT — PAIN DESCRIPTION - DESCRIPTORS: DESCRIPTORS: ACHING;CONSTANT

## 2021-09-11 ASSESSMENT — MIFFLIN-ST. JEOR: SCORE: 1239.53

## 2021-09-11 NOTE — PLAN OF CARE
"Vitals: BP 91/62 (BP Location: Right arm)   Pulse 91   Temp 98.7  F (37.1  C) (Oral)   Resp 16   Ht 1.702 m (5' 7\")   Wt 60.1 kg (132 lb 6.4 oz)   SpO2 97%   BMI 20.74 kg/m      Endocrine: Blood glucose checks Q4 hrs, 144, has no Insulin orders.  Labs: Stable labs, Phosphorus 4.2, no replacement needed.  Pain: Minimal G/JT site pain.  PRN's: Tylenol 650 mg X1.   Diet: Diet advanced to regular. Vitals 1.5 at goal of 45cc/hr.  LDA: PIV saline locked, G/JT. G-tube clamped for medications and PRN nausea.   GI:  BM this morning, declined laxatives.  : Voids spontaneously.  Skin: Skin is intact except for g/jtube.  Neuro: Pleasant, alert and oriented.  Mobility: Up ad koffi, walks in the halls independently.  Education: n/a  Plan: Discharge home tomorrow, [facility] will bring tube feeding supplies to the hospital and set the patient up. Plan is for early discharge home.    " Cedric Thomson

## 2021-09-11 NOTE — PLAN OF CARE
"/73 (BP Location: Right arm)   Pulse 93   Temp 98  F (36.7  C) (Oral)   Resp 16   Ht 1.702 m (5' 7\")   Wt 60.1 kg (132 lb 6.4 oz)   SpO2 95%   BMI 20.74 kg/m        3330-1749  Neuro:  Pt. alert & Ox4  Behavior:  Pt. pleasant & cooperative with cares.  Activity: Pt. up ad koffi.  Vital: AVSS on RA.  Endocrine: BG q 4 hours. 100, 112,115  LDAs:  Right PIV saline locked. G tube clamped. J tube for continuous tube feeds.  Cardiac: WNL  Respiratory: LS clear  GI/: Pt. voiding spontaneously, no stools this shift. PVR 0cc this shift.  Skin: Intact  Pain: Abdominal pain moderately controlled with prn Oxycodone x1 & prn IV Dilaudid x1.  Diet: Clears. Tube feeds at 45cc/hour which is goal rate.  Labs/Imaging: Morning labs pending.  Plan: Continue to follow POC & notify MD with change in status.   "

## 2021-09-11 NOTE — DISCHARGE SUMMARY
[facility]  Hospitalist Discharge Summary      Date of Admission:  9/7/2021  Date of Discharge:  9/12/2021  Discharging Provider: Leesa Saenz PA-C   Discharge Team: Hospitalist Service, [facility]     Discharge Diagnoses   - S/p percutaneous GJ tube placement, c/b displacement, s/p replacement of PEGJ tube with T-tag gastropexy  - Hypophosphatemia, resolved   - Urinary retention, resolved    Follow-ups Needed After Discharge   Follow-up Appointments     Adult [facility] Follow-up and recommended labs and tests      Please set up an appointment with:  - Follow up with your PCP in 1 week  - Follow up with Dr. Park with GI in 2 weeks     Appointments on [place] and/or [facility] (with [facility] or [facility]   provider or service). Call [phone] if you haven't heard regarding   these appointments within 7 days of discharge.         Follow Up and recommended labs and tests      - Please check your Magnesium, phosphorus, and potassium in 1 week with   your PCP             Unresulted Labs Ordered in the Past 30 Days of this Admission     No orders found from 8/8/2021 to 9/8/2021.        Discharge Disposition   Discharged to home  Condition at discharge: Stable    Hospital Course  Dinora Mcghee is a 56 year old female with PMHx hypothyroidism, DJD, GERD, hx of pituitary adenoma s/p resection, IgG4 pseudotumor of liver, chronic pancreatitis s/p TPAIT, chronic N/V recently diagnosed with gastroparesis with PEG placement admitted on 9/7/2021, following replacement of percutaneous GJ tube for observation, which was complicated by displacement of GJ tube s/p replacement on 9/10. Please see below for details regarding Ms. Dinora Mcghee's hospitalization.     # S/p percutaneous GJ tube placement   # Gastroparesis   # Severe malnutrition, POA   Ms. Mcghee follows with GI for chronic abdominal pain, nausea, vomiting, and weight loss. Recent gastric emptying showing gastroparesis on  "8/24. Please see prior GI outpatient note 8/9/2021. Patient receives IV infusion therapy via port. Had a G-tube, and presents for exchange to GJ, which was successfully placed with the distal end in the mid jejunum on 9/7. Patient developed nausea with reflux and tube feeds backing into G-tube. KUB showing PEGJ tub coiled in the stomach with distal tip at pylorus. GI performed EGD for replacement of PEGJ tube with T-tag gastropexy on 9/10. Patient now tolerating tube feeds at goal. Patient able to eat for comfort as tolerated. Dressing changes once daily. Pain controlled with tylenol on discharge. Continued home anti-emetics. Patient to follow up with GI in outpatient follow up with Dr. Park 2 weeks after GJ.    - Nutrition TF recommendations    - Vital 1.5 (or can use Peptamen 1.5) @ 45 ml/hr continuous via J-tube   - When TF running continuous, attach 1 relizorb cartridge every 12 hours.     - At least 60 ml free water 6 times per day.  If no oral intake consumed, recommend at least 800 ml daily via FT to maintain hydration.      - Once TF well tolerated at continuous rate of 45 ml/hr for at least 24 hours, consider attempting advancement to cycled regimen per the following schedule:  Night 1: Increase to 65 ml/hr x 16 hours (change relizorb cartridge every 8 hours)  Night 2: Increase to 75 ml/hr x 14 hours (change relizorb cartridge every 7 hours)  Night 3: Increase to 90 ml/hr x 12 hours  (charge relizorb cartridge every 6 hours or attach both cartridges at the start of the cycle \"double up\" on the cartridges)    #Hypophosphatemia, resolved - repleted per sliding scale     # Urinary retention, resolved - Developed urinary retention on DOA 1, likely due to pain and medications, which has now resolved.      # Chronic pancreatitis s/p TPAIT - Previously on creon tablets, but transitioned to Relizorb as noted above. Discussed with Dr. Melissa who recommended stopping acarbose while on continuous tube feeds.      # " Hx of IgG4 pseudotumor - Work up with elevated LFTs with mildly elevated IgG4. Liver lesion previously biopsied with noticeable proliferation and acute and chronic inflammation and fibrosis of liver with focally increased IgG4-positive plasma cells consistent with IgG4 pseudotumor. Followed by hepatology. LFTs at baseline. Recommend outpatient follow up with GI     # Chronic constipation -Continued PTA motegrity 2 mg daily, Miralax daily,  Senna-colace 1-2 tabs BID,     # GERD - Continued famotidine 20 mg BID, omeprazole 40 mg BID     # RAD, allergic rhinitis  - Azelastine 0.1% nasal spray, 1 spray BID, Cetrizine 10 mg daily, benadryl 25 mg Q6H PRN, and montelukast 10 mg daily.     # Hypothyroidism - TSH 2.41 on 9/14/2020. Continued PTA Levothyroxine 137 mcg daily     # Hx of migraine HA - Continued PTA Amitriptyline 60 mg QHS and Zolmitriptan 5 mg as needed at onset of headache       Consultations This Hospital Stay   REGIONAL ANESTHESIA PAIN SERVICE ADULT IP CONSULT  NUTRITION SERVICES ADULT IP CONSULT  VASCULAR ACCESS ADULT IP CONSULT  VASCULAR ACCESS CARE ADULT IP CONSULT  PHARMACY IP CONSULT  CARE MANAGEMENT / SOCIAL WORK IP CONSULT  VASCULAR ACCESS CARE ADULT IP CONSULT  VASCULAR ACCESS CARE ADULT IP CONSULT    Code Status   Full Code    Time Spent on this Encounter   I, ANA Davis, personally saw the patient today and spent greater than 30 minutes discharging this patient.       Laina Mirza PA-C  [facility] [address] [address]  Phone: [phone]  ______________________________________________________________________    Physical Exam   Vital Signs: Temp: 98.7  F (37.1  C) Temp src: Oral BP: 91/62 Pulse: 91   Resp: 16 SpO2: 97 % O2 Device: None (Room air)    Weight: 134 lbs 14.4 oz  GENERAL: Alert and oriented x 3. No acute distress, reports well controlled pain   HEENT: normocephalic, atraumatic, PERRLA  CARDIOVASCULAR: RRR. S1, S2. No  murmurs, rubs, or gallops.   RESPIRATORY: Breathing comfortably on room air. Clear to auscultation bilaterally, no rales, rhonchi or wheezes, respirations unlabored   GI: Abdomen soft, non-tender abdomen without rebound or guarding,  normoactive bowel sounds present. PEGJ in place.    EXTREMITIES: No peripheral edema.   NEUROLOGICAL: CN II-XII grossly intact. Moving all extremities symmetrically.   SKIN: Intact. Warm and dry. No jaundice.       Primary Care Physician   Latasha Paul    Discharge Orders      Home infusion referral      Activity    Your activity upon discharge: activity as tolerated     Adult [facility] Follow-up and recommended labs and tests    Please set up an appointment with:  - Follow up with your PCP in 1 week  - Follow up with Dr. Park with GI in 2 weeks     Appointments on [place] and/or [facility] (with [facility] or [facility] provider or service). Call [phone] if you haven't heard regarding these appointments within 7 days of discharge.     Follow Up and recommended labs and tests    - Please check your Magnesium, phosphorus, and potassium in 1 week with your PCP     When to contact your care team    Call your PCP or return to ED for temperatures > 100.4 degrees, worsening or changing pain, uncontrolled vomiting or inability to tolerate oral intake, new or worsening diarrhea, new or worsening shortness of breath, decreased urine output, yellowing of the eyes or skin, confusion, weakness, blood in urine or stools, or any other concerning symptoms.     Tubes and drains    You are going home with the following tubes or drains: percutaneous GJ tube.  Tube cares per hospital or home care instructions     Reason for your hospital stay    Dear Dinora Mcghee    Your were hospitalized at [facility] following a percutaneous GJ tube and started on tube feeds.  Over your hospitalization your nausea, vomiting and pain improved and you are tolerating goal tube feed  rates and today you are ready to be discharged home.  If you continue home medication therapy and tube feeds you should continue to improve but if you develop fever, shortness of breath, light headedness, chest pain, abdominal pain, nausea, vomiting, or any other concerning symptoms please seek medical attention.    We are suggesting the following medication changes:  - You can stop taking your acarbose per discussion with Dr. Melissa   - Stop creon tablets, you will be taking Relizorb enzyme cartridge    Please get the following tests done:  - Please check your Magnesium, phosphorus, and potassium in 1 week with your PCP     Please set up an appointment with:  - Follow up with your PCP in 1 week  - Follow up with Dr. Park with GI in 2 weeks     It was a pleasure meeting with you today. Thank you for allowing me and my team the privilege of caring for you today. You are the reason we are here, and I truly hope we provided you with the excellent service you deserve. Please let us know if there is anything else we can do for you so that we can be sure you are leaving completely satisfied with your care experience.    Take care!  Laina Mirza PA-C  Hospitalist Service     Discharge Instructions    TUBE FEEDING DETAILS:     Tube Feeding: Vital 1.5 (or can use Peptamen 1.5) @ 45 ml/hr continuous via J-tube  When TF running continuous, attach 1 relizorb cartridge every 12 hours.       Water flushes: At least 60 ml free water 6 times per day.  If no oral intake consumed, recommend at least 800 ml daily via FT to maintain hydration.      Once TF well tolerated at continuous rate of 45 ml/hr for at least 24 hours, consider attempting advancement to cycled regimen per the following schedule:    Night 1: Increase to 65 ml/hr x 16 hours (change relizorb cartridge every 8 hours)  Night 2: Increase to 75 ml/hr x 14 hours (change relizorb cartridge every 7 hours)  Night 3: Increase to 90 ml/hr x 12 hours  (charge relizorb  "cartridge every 6 hours or attach both cartridges at the start of the cycle \"double up\" on the cartridges)     Diet    Follow this diet upon discharge: Orders Placed This Encounter      Adult Formula Drip Feeding: Continuous Vital 1.5; Jejunostomy; Goal Rate: 45; mL/hr; Medication - Feeding Tube Flush Frequency: At least 15-30 mL water before and after medication administration and with tube clogging; Amount to Send (Nutrition us...      Advance Diet as Tolerated: Regular Diet Adult       Significant Results and Procedures   Most Recent 3 CBC's:Recent Labs   Lab Test 09/08/21  0605 08/07/21 2029 07/20/21 0943   WBC 7.0 5.7 4.5   HGB 13.0 13.4 13.4   MCV 95 94 94    356 348     Most Recent 3 BMP's:Recent Labs   Lab Test 09/12/21  0733 09/12/21  0209 09/11/21  1312 09/11/21  0646 09/10/21  0601 09/09/21  0613   NA  --   --   --  140 140 140   POTASSIUM  --   --   --  3.9 3.8 4.0   CHLORIDE  --   --   --  104 104 105   CO2  --   --   --  28 28 30   BUN  --   --   --  8 8 7   CR  --   --   --  0.60 0.66 0.62   ANIONGAP  --   --   --  8 8 5   KASSI  --   --   --  9.0 9.2 9.2   * 129* 147* 115* 83 149*     Most Recent 2 LFT's:Recent Labs   Lab Test 09/08/21 0605 08/07/21 2029   AST 59* 42   * 92*   ALKPHOS 99 94   BILITOTAL 1.2 1.0     Most Recent 3 INR's:Recent Labs   Lab Test 07/20/21  0943 07/13/21  1731 01/16/18  0816   INR 1.13 1.10 1.05       Discharge Medications   Current Discharge Medication List      CONTINUE these medications which have NOT CHANGED    Details   amitriptyline (ELAVIL) 10 MG tablet Take 5 tablets (50 mg) by mouth At Bedtime Take 40 mg at bedtime  Qty: 150 tablet, Refills: 11    Associated Diagnoses: Chronic pain syndrome; Headache disorder      azelastine (ASTELIN) 0.1 % nasal spray Spray 1 spray into both nostrils 2 times daily  Qty: 1 Bottle, Refills: 11    Associated Diagnoses: Seasonal allergies      cetirizine (ZYRTEC) 10 MG tablet Take 1 tablet (10 mg) by mouth " daily  Qty: 90 tablet, Refills: 3    Associated Diagnoses: Elevated liver enzymes      diphenhydrAMINE (BENADRYL ALLERGY) 25 MG capsule Take 1 capsule (25 mg) by mouth every 6 hours as needed for itching or allergies  Qty: 56 capsule, Refills: 0    Associated Diagnoses: Itching; Post-pancreatectomy diabetes (H); History of pancreatectomy; Pancreatic insufficiency      famotidine (PEPCID) 20 MG tablet Take 1 tablet (20 mg) by mouth 2 times daily  Qty: 180 tablet, Refills: 3    Associated Diagnoses: Gastroesophageal reflux disease without esophagitis      FOLIC ACID PO Take 1 mg by mouth every morning       ibuprofen (ADVIL/MOTRIN) 400 MG tablet Take 1 tablet (400 mg) by mouth every 6 hours as needed for moderate pain  Qty: 30 tablet, Refills: 0    Associated Diagnoses: Acute post-operative pain      levothyroxine (SYNTHROID) 137 MCG tablet Take 1 tablet (137 mcg) by mouth daily  Qty: 90 tablet, Refills: 3    Associated Diagnoses: Hypothyroidism, unspecified type      montelukast (SINGULAIR) 10 MG tablet TAKE 1 TABLET(10 MG) BY MOUTH AT BEDTIME  Qty: 90 tablet, Refills: 3    Associated Diagnoses: Seasonal allergies      multivitamin CF formula (CHOICEFUL) capsule Take 1 tablet by mouth daily  Refills: 11    Associated Diagnoses: Itching; Post-pancreatectomy diabetes (H); History of pancreatectomy; Pancreatic insufficiency      omeprazole (PRILOSEC) 40 MG DR capsule Take 1 capsule (40 mg) by mouth 2 times daily  Qty: 180 capsule, Refills: 3    Associated Diagnoses: Gastroesophageal reflux disease without esophagitis      polyethylene glycol (MIRALAX) 17 GM/Dose powder Take 17 g (1 capful) by mouth daily  Qty: 507 g, Refills: 11    Associated Diagnoses: Slow transit constipation      prochlorperazine (COMPAZINE) 5 MG tablet Take 1 tablet (5 mg) by mouth every 6 hours as needed Take two 5 mg tablets by mouth every six hours as needed for nausea.  Qty: 60 tablet, Refills: 1    Associated Diagnoses: History of  pancreatectomy; Pancreatic insufficiency      promethazine (PHENERGAN) 25 MG tablet Take 1 tablet (25 mg) by mouth daily as needed  Qty: 60 tablet, Refills: 1    Associated Diagnoses: Nausea      Prucalopride Succinate (MOTEGRITY) 2 MG TABS Take 2 mg by mouth daily  Qty: 30 tablet, Refills: 11    Associated Diagnoses: Chronic idiopathic constipation      scopolamine (TRANSDERM) 1 MG/3DAYS 72 hr patch Place 1 patch onto the skin every 72 hours  Qty: 10 patch, Refills: 11    Associated Diagnoses: Slow transit constipation      senna-docusate (SENOKOT-S;PERICOLACE) 8.6-50 MG per tablet Take 1-2 tablets by mouth 2 times daily  Qty: 100 tablet, Refills: 0    Associated Diagnoses: Incisional hernia, without obstruction or gangrene      ZOLMitriptan (ZOMIG) 5 MG tablet Take 1 tablet (5 mg) by mouth at onset of headache for migraine  Qty: 9 tablet, Refills: 3    Associated Diagnoses: Headache disorder      blood glucose (PAT CONTOUR NEXT) test strip Use to test blood sugar 6 times daily or as directed.  Qty: 2 Box, Refills: 11    Associated Diagnoses: Post-pancreatectomy diabetes (H)      blood glucose monitoring (PAT MICROLET) lancets Use to test blood sugar 6-8 times daily or as directed.  Qty: 100 each, Refills: 11    Associated Diagnoses: Acute on chronic pancreatitis (H)      Continuous Blood Gluc Sensor (FREESTYLE LISA 2 SENSOR) MISC 1 each every 14 days  Qty: 2 each, Refills: 11    Associated Diagnoses: Post-pancreatectomy diabetes (H)      estradiol (VAGIFEM) 10 MCG TABS vaginal tablet Place 1 tablet (10 mcg) vaginally twice a week  Qty: 24 tablet, Refills: 3    Associated Diagnoses: Atrophic vaginitis      glucose 40 % GEL gel Take 15-30 g by mouth every 15 minutes as needed for low blood sugar  Qty: 3 Tube, Refills: 2    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      order for DME Equipment being ordered:Cefaly  Qty: 1 Device, Refills: 0    Associated Diagnoses: Headache disorder      Sharps  Container (BD SHARPS ) MISC 1 Container as needed  Qty: 1 each, Refills: 1    Associated Diagnoses: Post-pancreatectomy diabetes (H)         STOP taking these medications       acarbose (PRECOSE) 50 MG tablet Comments:   Reason for Stopping:         amylase-lipase-protease (CREON 24) [address] units CPEP per EC capsule Comments:   Reason for Stopping:             Allergies   Allergies   Allergen Reactions     Apple Anaphylaxis     Corticosteroids Other (See Comments)     All oral, IV and injectable steroids are contraindicated (unless in life threatening situations) in [employer] transplant recipients. They can cause irreversible loss of islet cell function. Please contact patient's transplant care coordinator ADI Gaffney RN at [phone]/pager [phone] and/or endocrinologist prior to administration.       Depakote [Divalproex Sodium] Other (See Comments)     Chest pain     Zithromax [Azithromycin Dihydrate] Anaphylaxis     Noted in 4/7/08 ER     Darvocet [Propoxyphene N-Apap] Itching     Morphine Nausea and Vomiting and Rash     Nalbuphine Hcl Rash     RASH :nubaine     Zosyn [Piperacillin-Tazobactam In D5w] Rash     Possible allergy, did have a diffuse rash that seemed drug related but could have also been related to soap in the hospital.      Bactrim [Sulfamethoxazole W-Trimethoprim] Other (See Comments) and Nausea and Vomiting     Severely low liver function.     Cats      Compazine [Prochlorperazine] Other (See Comments)     Twitching. Takes Benedryl and is fine     Dust Mites      Grass      Ragweeds      Tape [Adhesive Tape] Blisters     Tramadol      Trees      Zofran [Ondansetron] Other (See Comments)     migraine     Flagyl [Metronidazole] Hives and Rash

## 2021-09-11 NOTE — PLAN OF CARE
"VS: /68 (BP Location: Right arm)   Pulse 91   Temp 97.2  F (36.2  C) (Oral)   Resp 16   Ht 1.702 m (5' 7\")   Wt 60.1 kg (132 lb 6.4 oz)   SpO2 98%   BMI 20.74 kg/m      Cares:0700-190    Current condition: Stable on RA  Neuro: WDL  Cardio: WDL   Respiratory: WDL  GI/: Voiding spontaneously, Last BM 9/9  Skin: WDL  Diet:   Clears  Labs: Phos: 3.7  BG: Q4 hours: Pt gone most of afternoon.Will check when back  LDA:  PIV saline locked.   Mobility:  Up ad koffi.   Pain:abdominal  PRN medications: Dilaudid x1   Changes: Pt had G/J replaced.   Plan of Care: Will continue to monitor and assess for any changes.   "

## 2021-09-11 NOTE — PROGRESS NOTES
[facility]    Medicine Progress Note - Hospitalist Service, Gold 6       Date of Admission:  9/7/2021    Assessment & Plan         Dinora Mcghee is a 56 year old female with PMHx hypothyroidism, DJD, GERD, hx of pituitary adenoma s/p resection, IgG4 pseudotumor of liver, chronic pancreatitis s/p TPAIT, chronic N/V recently diagnosed with gastroparesis with PEG placement admitted on 9/7/2021, following replacement of percutaneous GJ tube for observation, which was complicated by displacement of GJ tube s/p replacement on 9/10. Patient now tolerating tube feeds at goal rate.     # S/p percutaneous GJ tube placement   # Gastroparesis   # Severe malnutrition, POA - Patient follows with GI for chronic abdominal pain, nausea, vomiting, and weight loss. Recent gastric emptying showing gastroparesis on 8/24. Please see prior GI outpatient note 8/9/2021. Patient receives IV infusion therapy via port. Had a G-tube, and presents for exchange to GJ, which was successfully placed with the distal end in the mid jejunum on 9/7. Patient developed nausea with reflux and tube feeds backing into G-tube. KUB showing PEGJ tub coiled in the stomach with distal tip at pylorus. GI performed EGD for replacement of PEGJ tube with T-tag gastropexy on 9/10. Patient now tolerating tube feeds at goal. Patient able to eat for comfort as tolerated. Dressing changes once daily. Pain controlled with tylenol on discharge. Continued home anti-emetics. Patient to follow up with GI in outpatient follow up with Dr. Park 2 weeks after GJ.    - Nutrition TF recommendations    - Vital 1.5 (or can use Peptamen 1.5) @ 45 ml/hr continuous via J-tube   - When TF running continuous, attach 1 relizorb cartridge every 12 hours.     - At least 60 ml free water 6 times per day.  If no oral intake consumed, recommend at least 800 ml daily via FT to maintain hydration.      - Once TF well tolerated at continuous rate  "of 45 ml/hr for at least 24 hours, consider attempting advancement to cycled regimen per the following schedule:  Night 1: Increase to 65 ml/hr x 16 hours (change relizorb cartridge every 8 hours)  Night 2: Increase to 75 ml/hr x 14 hours (change relizorb cartridge every 7 hours)  Night 3: Increase to 90 ml/hr x 12 hours  (charge relizorb cartridge every 6 hours or attach both cartridges at the start of the cycle \"double up\" on the cartridges)    #Hypophosphatemia, resolved - repleted per sliding scale     # Urinary retention, resolved - Developed urinary retention on DOA 1, likely due to pain and medications, which has now resolved.      # Chronic pancreatitis s/p TPAIT - Previously on creon tablets, but transitioned to Relizorb as noted above. Discussed with Dr. Melissa who recommended stopping acarbose while on continuous tube feeds.      # Hx of IgG4 pseudotumor - Work up with elevated LFTs with mildly elevated IgG4. Liver lesion previously biopsied with noticeable proliferation and acute and chronic inflammation and fibrosis of liver with focally increased IgG4-positive plasma cells consistent with IgG4 pseudotumor. Followed by hepatology. LFTs at baseline. Recommend outpatient follow up with GI     # Chronic constipation -Continued PTA motegrity 2 mg daily, Miralax daily,  Senna-colace 1-2 tabs BID,     # GERD - Continued famotidine 20 mg BID, omeprazole 40 mg BID     # RAD, allergic rhinitis  - Azelastine 0.1% nasal spray, 1 spray BID, Cetrizine 10 mg daily, benadryl 25 mg Q6H PRN, and montelukast 10 mg daily.     # Hypothyroidism - TSH 2.41 on 9/14/2020. Continued PTA Levothyroxine 137 mcg daily     # Hx of migraine HA - Continued PTA Amitriptyline 60 mg QHS and Zolmitriptan 5 mg as needed at onset of headache        Diet: Adult Formula Drip Feeding: Continuous Vital 1.5; Jejunostomy; Goal Rate: 45; mL/hr; Medication - Feeding Tube Flush Frequency: At least 15-30 mL water before and after medication " administration and with tube clogging; Amount to Send (Nutrition us...  Advance Diet as Tolerated: Regular Diet Adult  Diet    DVT Prophylaxis: Low Risk/Ambulatory with no VTE prophylaxis indicated  Piedra Catheter: Not present  Central Lines: None  Code Status: Full Code      Disposition Plan   Expected discharge:  tomorrow on 9/12/21 recommended to prior living arrangement once Infusion services can bring tube feeds tomorrow. .     The patient's care was discussed with the Attending Physician, Dr. Levon Nair, Bedside Nurse, Care Coordinator/, Patient and Patient's Family.    Laina Mirza PA-C  Hospitalist Service, [address]  Securely message with the Vocera Web Console (learn more here)  Text page via [employer] Custom Paging/Directory  Please see sign in/sign out for up to date coverage information    Clinically Significant Risk Factors Present on Admission           # Severe Malnutrition, POA: based on Weight loss;Reduced intake (09/09/21 1400)     ______________________________________________________________________    Interval History   Patient states she is feeling well, tolerating tube feeds at goal. Denies any N/V. States her abdominal pain is mild and controlled with tylenol. Wanting to go home today, and planned to discharge home, but unfortunately home infusion nursing staff are short staffed and unable to deliver TF to hospital today and would not be able to drop off at [facility] tomorrow. Plan for discharge home tomorrow morning once tube feeds set up.     Data reviewed today: I reviewed all medications, new labs and imaging results over the last 24 hours.     Physical Exam   Vital Signs: Temp: 98.7  F (37.1  C) Temp src: Oral BP: 91/62 Pulse: 91   Resp: 16 SpO2: 97 % O2 Device: None (Room air)    Weight: 134 lbs 14.4 oz  GENERAL: Alert and oriented x 3. NAD. Pleasant and conversational   HEENT: AT/NC. Anicteric sclera. Mucous membranes  moist   CARDIOVASCULAR: RRR. S1, S2. No murmurs, rubs, or gallops.   RESPIRATORY: Effort normal on RA. Clear to auscultation bilaterally, no rales, rhonchi or wheezes, respirations unlabored   GI: Abdomen soft, non-tender abdomen without rebound or guarding,  normoactive bowel sounds present. PEGJ in place.    EXTREMITIES: No peripheral edema.   NEUROLOGICAL: CN II-XII grossly intact. Moving all extremities symmetrically.   SKIN: Intact. Warm and dry. No jaundice.    Data   Recent Labs   Lab 09/11/21  0952 09/11/21  0646 09/11/21  0351 09/10/21  0601 09/09/21  0613 09/08/21  1645 09/08/21  0605   WBC  --   --   --   --   --   --  7.0   HGB  --   --   --   --   --   --  13.0   MCV  --   --   --   --   --   --  95   PLT  --   --   --   --   --   --  303   NA  --  140  --  140 140  --  138   POTASSIUM  --  3.9  --  3.8 4.0  --  3.8   CHLORIDE  --  104  --  104 105  --  103   CO2  --  28  --  28 30  --  28   BUN  --  8  --  8 7  --  8   CR  --  0.60  --  0.66 0.62  --  0.62   ANIONGAP  --  8  --  8 5  --  7   KASSI  --  9.0  --  9.2 9.2  --  9.0   * 115* 115* 83 149*   < > 124*   ALBUMIN  --   --   --   --   --   --  3.4   PROTTOTAL  --   --   --   --   --   --  6.7*   BILITOTAL  --   --   --   --   --   --  1.2   ALKPHOS  --   --   --   --   --   --  99   ALT  --   --   --   --   --   --  126*   AST  --   --   --   --   --   --  59*    < > = values in this interval not displayed.     No results found for this or any previous visit (from the past 24 hour(s)).  Medications       amitriptyline  60 mg Oral At Bedtime     cetirizine  10 mg Oral Daily     famotidine  20 mg Oral QAM     levothyroxine  137 mcg Oral QAM     montelukast  10 mg Oral At Bedtime     pantoprazole  40 mg Oral BID     polyethylene glycol  17 g Oral QAM     scopolamine  1 patch Transdermal Q72H     scopolamine   Transdermal Q8H     senna-docusate  1-2 tablet Oral QAM     sodium chloride (PF)  3 mL Intracatheter Q8H

## 2021-09-12 VITALS
DIASTOLIC BLOOD PRESSURE: 49 MMHG | HEIGHT: 67 IN | SYSTOLIC BLOOD PRESSURE: 94 MMHG | WEIGHT: 132.8 LBS | RESPIRATION RATE: 16 BRPM | HEART RATE: 78 BPM | BODY MASS INDEX: 20.84 KG/M2 | TEMPERATURE: 97.9 F | OXYGEN SATURATION: 97 %

## 2021-09-12 LAB
GLUCOSE BLDC GLUCOMTR-MCNC: 129 MG/DL (ref 70–99)
GLUCOSE BLDC GLUCOMTR-MCNC: 131 MG/DL (ref 70–99)
GLUCOSE BLDC GLUCOMTR-MCNC: 141 MG/DL (ref 70–99)
PHOSPHATE SERPL-MCNC: 3.9 MG/DL (ref 2.5–4.5)

## 2021-09-12 PROCEDURE — 84100 ASSAY OF PHOSPHORUS: CPT | Performed by: PHYSICIAN ASSISTANT

## 2021-09-12 PROCEDURE — 250N000013 HC RX MED GY IP 250 OP 250 PS 637: Performed by: PEDIATRICS

## 2021-09-12 PROCEDURE — 36415 COLL VENOUS BLD VENIPUNCTURE: CPT | Performed by: PHYSICIAN ASSISTANT

## 2021-09-12 PROCEDURE — 99239 HOSP IP/OBS DSCHRG MGMT >30: CPT | Performed by: PHYSICIAN ASSISTANT

## 2021-09-12 PROCEDURE — 250N000013 HC RX MED GY IP 250 OP 250 PS 637: Performed by: INTERNAL MEDICINE

## 2021-09-12 RX ORDER — CYCLOBENZAPRINE HCL 5 MG
5 TABLET ORAL 3 TIMES DAILY
Qty: 12 TABLET | Refills: 0 | Status: ON HOLD | OUTPATIENT
Start: 2021-09-12 | End: 2021-09-24

## 2021-09-12 RX ADMIN — CETIRIZINE HYDROCHLORIDE 10 MG: 10 TABLET, FILM COATED ORAL at 08:10

## 2021-09-12 RX ADMIN — POLYETHYLENE GLYCOL 3350 17 G: 17 POWDER, FOR SOLUTION ORAL at 08:11

## 2021-09-12 RX ADMIN — ACETAMINOPHEN 650 MG: 325 TABLET, FILM COATED ORAL at 01:59

## 2021-09-12 RX ADMIN — CYCLOBENZAPRINE HYDROCHLORIDE 5 MG: 5 TABLET, FILM COATED ORAL at 13:52

## 2021-09-12 RX ADMIN — ACETAMINOPHEN 650 MG: 325 TABLET, FILM COATED ORAL at 12:20

## 2021-09-12 RX ADMIN — PANTOPRAZOLE SODIUM 40 MG: 40 TABLET, DELAYED RELEASE ORAL at 08:10

## 2021-09-12 RX ADMIN — CYCLOBENZAPRINE HYDROCHLORIDE 5 MG: 5 TABLET, FILM COATED ORAL at 08:09

## 2021-09-12 RX ADMIN — LEVOTHYROXINE SODIUM 137 MCG: 0.14 TABLET ORAL at 08:10

## 2021-09-12 RX ADMIN — FAMOTIDINE 20 MG: 20 TABLET, FILM COATED ORAL at 08:09

## 2021-09-12 ASSESSMENT — ACTIVITIES OF DAILY LIVING (ADL)
ADLS_ACUITY_SCORE: 10
ADLS_ACUITY_SCORE: 12
ADLS_ACUITY_SCORE: 12
ADLS_ACUITY_SCORE: 10

## 2021-09-12 ASSESSMENT — MIFFLIN-ST. JEOR: SCORE: 1230.01

## 2021-09-12 NOTE — PLAN OF CARE
"BP 96/55 (BP Location: Right arm)   Pulse 82   Temp 98.4  F (36.9  C) (Oral)   Resp 16   Ht 1.702 m (5' 7\")   Wt 61.2 kg (134 lb 14.4 oz)   SpO2 97%   BMI 21.13 kg/m      VS: VSS  B  Pain/Nausea/PRN: Denies  Diet: Regular diet  LDA: PIV  GI: No BM this shift  : Voiding independently  Skin: New GJ site. Scant drainage. Dressing is CDI.  Mobility: Up independently.  "

## 2021-09-12 NOTE — PROGRESS NOTES
Care Management Discharge Note    Discharge Date: 09/12/2021       Discharge Disposition: Home, Home Infusion    Discharge Services: skilled home care RN for home enteral feeding education reinforcement.    Discharge DME: None    Discharge Transportation: family or friend will provide    Private pay costs discussed: NA    PAS Confirmation Code:  REJI    Patient/family educated on Medicare website which has current facility and service quality ratings:  Yes    Education Provided on the Discharge Plan:  Yes  Persons Notified of Discharge Plans:  NA  Patient/Family in Agreement with the Plan: yes    Handoff Referral Completed: Yes    Additional Information:    Bedside RN updated that the [facility] RN will arrive at 1:00p.m.to bring enteral feeding supplies and attach patient to home enteral feeding for discharge today.  Patient will follow up in clinic as designated in discharge orders.    Liza Brownlee,  B.S.N., R.N., P.H.N..  Care Coordinator     Pager   [facility] [place]

## 2021-09-12 NOTE — PLAN OF CARE
"Vitals: /72 (BP Location: Right arm)   Pulse 75   Temp 97.7  F (36.5  C) (Oral)   Resp 16   Ht 1.702 m (5' 7\")   Wt 61.2 kg (134 lb 14.4 oz)   SpO2 98%   BMI 21.13 kg/m    Endocrine: Q4 hr BG checks  Labs: RN Phos replacement protocol. Phos  4.2. Re-check in am.   Pain: Denies  PRN's: Oxycodone given x1  Diet: Regular with TF.   LDA: PIV saline locked.   GI: No BM on this shift  : Voiding without difficulty.   Skin: GJ tube. Site cleansed and dressing changed by patient with verbal assistance.   Neuro: Intact  Mobility: UAL  Education: Writer provided verbal instruction on TF system set up. Pt performed while pt's  observed. Pt also instructed how to cleanse GJ site and re-apply split gauze dressing.   Plan: Pt to discharge home with home care possibly tomorrow.     "

## 2021-09-12 NOTE — PLAN OF CARE
Plan is for patient to discharge home today. VSS, afebrile, denies nausea, minimal G/JT site pain. [facility] to set up patient for home tube feeding, supplies coming here. No IV access, G/T tube in place. All belongings with the patient. Discharge orders reviewed, 1 prescription per [facility].

## 2021-09-13 ENCOUNTER — HOME INFUSION (PRE-WILLOW HOME INFUSION) (OUTPATIENT)
Dept: PHARMACY | Facility: CLINIC | Age: 56
End: 2021-09-13

## 2021-09-13 ENCOUNTER — MEDICAL CORRESPONDENCE (OUTPATIENT)
Dept: HEALTH INFORMATION MANAGEMENT | Facility: CLINIC | Age: 56
End: 2021-09-13

## 2021-09-14 ENCOUNTER — VIRTUAL VISIT (OUTPATIENT)
Dept: PSYCHOLOGY | Facility: CLINIC | Age: 56
End: 2021-09-14
Payer: COMMERCIAL

## 2021-09-14 DIAGNOSIS — F43.23 ADJUSTMENT DISORDER WITH MIXED ANXIETY AND DEPRESSED MOOD: Primary | ICD-10-CM

## 2021-09-14 PROCEDURE — 90837 PSYTX W PT 60 MINUTES: CPT | Mod: 95 | Performed by: STUDENT IN AN ORGANIZED HEALTH CARE EDUCATION/TRAINING PROGRAM

## 2021-09-14 NOTE — PROGRESS NOTES
"  Health Psychology                                                                                                                          Sweta Michelle, Ph.D., L.P [phone]  Melva Beebe, Ph.D., L.P. [phone]  Rachel Sloan, Ph.D. [phone]  Kenya Sanchez, Ph.D., L.P. [phone]  Estrada Patel, Ph.D., A.B.P.P., L.P. [phone]         Enedina Joaquin, Ph.D., L.P. [phone]                [facility], 3rd Floor  [address] [place] [address]     Health Psychology Follow-up Visit - Telehealth Visit     Dinora Mcghee is a 56 year old female who is being evaluated via a billable video visit.       The patient has been notified of following:      \"This video visit will be conducted via a video visit between you and your physician/provider. We have found that certain health care needs can be provided without the need for an in-person physical exam.  This service lets us provide the care you need with a video conversation.  If a prescription is necessary we can send it directly to your pharmacy.  If lab work is needed we can place an order for that and you can then stop by our lab to have the test done at a later time.     Video visits are billed at different rates depending on your insurance coverage.  Please reach out to your insurance provider with any questions.     If during the course of the call the physician/provider feels a video visit is not appropriate, you will not be charged for this service.\"     Patient has given verbal consent for Video visit? Yes     Will anyone else be joining your video visit? No    Date of Service:  9/14/21    SUBJECTIVE:  Dinroa Mcghee was seen for individual psychotherapy. Reviewed emotional and physical health since our last session.    Symptoms reported: Sadness related to having feeding tube placed and what this signifies about her health, also anxiety due to previous experiences with feeding tube. Now " that she is back home after admission feeling slightly better physically.     Progress towards goals: Feeding tube placed, has found formula that is a good fit for her. Utilizing social support effectively to cope. Continuing with self-guided practices to manage mood and anxiety symptoms.     Interventions utilized: Reviewed experiences with surgery and admission. Highlighted Dinora's ability to identify positive experiences as well as setbacks. Discussed plans for recovery and managing symptoms moving forward. Validated Dinora's emotional experiences. Dinora read a post she made about her experience.  We discussed the post/reflection and themes that presented such as grief she is experiencing.     Ms. Mcghee was engaged throughout the visit and expressed understanding of information provided.     OBJECTIVE:  Ms. Mcghee was available for scheduled visit.  She reported sad mood, anxious.  Affect was mood- and thought-congruent.  Tearful at times.  Insight and judgment good.  Thought processes logical and linear. Speech WNL.  Denied suicidal ideation.     ASSESSMENT:  Psychological Assessment:  The PHQ-9 is an instrument for screening, diagnosing, monitoring and measuring the severity of depression. Scores of 5, 10, 15, and 20 represent cutpoints for mild, moderate, moderately severe and severe depression, respectively.   PHQ-9 SCORE 7/1/2019 6/21/2021 7/8/2021   PHQ-9 Total Score MyChart 3 (Minimal depression) 13 (Moderate depression) 13 (Moderate depression)   PHQ-9 Total Score 3 13 13       The DARCY-7 is an instrument for screening, diagnosing, monitoring and measuring the severity of anxiety. Scores of 5, 10, and 15 represent cutpoints for mild, moderate, and severe anxiety, respectively.  DARCY-7 SCORE 1/11/2018 1/22/2018 6/21/2021   Total Score 2 (minimal anxiety) - 0 (minimal anxiety)   Total Score 2 2 0     Ongoing therapy is appropriate for Ms. Mcghee, she expresses benefit from meeting.      DIAGNOSIS:  Adjustment disorder with mixed anxiety and depressed mood.     PLAN:  Follow-up appointment on 10/7/21     Type of service: Telemedicine Psychotherapy for coping with health conditions and symptoms of depression and anxiety  Time of service:   ? Date: 9/14/21  ? Time Service Began: 8:00  ? Time Service Ended: 9:00  Reason that telemedicine is appropriate: Public health regulations due to COVID-19 pandemic/state of emergency  Mode of transmission: Secure real time interactive audio and visual telecommunication system via [email or web address]  Location of originating and distant sites:  ? Originating site (patient location): Patient's home  ? Distant site (provider site): Provider's home    Rachel Sloan, PhD,   Health Psychology Fellow    Tx plan completed: 7/8/21  Tx plan due:  7/8/22      I did not see this patient directly. I have read and agree with the contents of this note. Kenya Sanchez, PhD, LP, October 7, 2021

## 2021-09-14 NOTE — PROGRESS NOTES
This is a recent snapshot of the patient's [facility] medical record.  For current drug dose and complete information and questions, call [phone] or In Basket pool, [facility] home infusion ([id number])  CSN Number:  [id number]

## 2021-09-15 DIAGNOSIS — Z12.31 ENCOUNTER FOR SCREENING MAMMOGRAM FOR BREAST CANCER: Primary | ICD-10-CM

## 2021-09-16 ENCOUNTER — MEDICAL CORRESPONDENCE (OUTPATIENT)
Dept: HEALTH INFORMATION MANAGEMENT | Facility: CLINIC | Age: 56
End: 2021-09-16

## 2021-09-16 ENCOUNTER — ANCILLARY PROCEDURE (OUTPATIENT)
Dept: GENERAL RADIOLOGY | Facility: CLINIC | Age: 56
End: 2021-09-16
Attending: INTERNAL MEDICINE
Payer: COMMERCIAL

## 2021-09-16 ENCOUNTER — OFFICE VISIT (OUTPATIENT)
Dept: INTERNAL MEDICINE | Facility: CLINIC | Age: 56
End: 2021-09-16
Payer: COMMERCIAL

## 2021-09-16 ENCOUNTER — LAB (OUTPATIENT)
Dept: LAB | Facility: CLINIC | Age: 56
End: 2021-09-16
Payer: COMMERCIAL

## 2021-09-16 VITALS
OXYGEN SATURATION: 98 % | DIASTOLIC BLOOD PRESSURE: 66 MMHG | HEART RATE: 74 BPM | SYSTOLIC BLOOD PRESSURE: 102 MMHG | BODY MASS INDEX: 20.67 KG/M2 | WEIGHT: 132 LBS

## 2021-09-16 DIAGNOSIS — E03.9 HYPOTHYROIDISM, UNSPECIFIED TYPE: Primary | ICD-10-CM

## 2021-09-16 DIAGNOSIS — G89.4 CHRONIC PAIN SYNDROME: ICD-10-CM

## 2021-09-16 DIAGNOSIS — Z46.59 ENCOUNTER FOR CARE RELATED TO FEEDING TUBE: Primary | ICD-10-CM

## 2021-09-16 DIAGNOSIS — J30.2 SEASONAL ALLERGIES: ICD-10-CM

## 2021-09-16 DIAGNOSIS — R51.9 HEADACHE DISORDER: ICD-10-CM

## 2021-09-16 DIAGNOSIS — E03.9 HYPOTHYROIDISM, UNSPECIFIED TYPE: ICD-10-CM

## 2021-09-16 DIAGNOSIS — Z46.59 ENCOUNTER FOR CARE RELATED TO FEEDING TUBE: ICD-10-CM

## 2021-09-16 DIAGNOSIS — R10.84 ABDOMINAL PAIN, GENERALIZED: ICD-10-CM

## 2021-09-16 LAB
ERYTHROCYTE [DISTWIDTH] IN BLOOD BY AUTOMATED COUNT: 13.5 % (ref 10–15)
ERYTHROCYTE [SEDIMENTATION RATE] IN BLOOD BY WESTERGREN METHOD: 10 MM/HR (ref 0–30)
HCT VFR BLD AUTO: 39.7 % (ref 35–47)
HGB BLD-MCNC: 13 G/DL (ref 11.7–15.7)
MCH RBC QN AUTO: 31.3 PG (ref 26.5–33)
MCHC RBC AUTO-ENTMCNC: 32.7 G/DL (ref 31.5–36.5)
MCV RBC AUTO: 96 FL (ref 78–100)
PLATELET # BLD AUTO: 387 10E3/UL (ref 150–450)
RBC # BLD AUTO: 4.15 10E6/UL (ref 3.8–5.2)
TSH SERPL DL<=0.005 MIU/L-ACNC: 0.67 MU/L (ref 0.4–4)
WBC # BLD AUTO: 6.5 10E3/UL (ref 4–11)

## 2021-09-16 PROCEDURE — 85027 COMPLETE CBC AUTOMATED: CPT | Performed by: PATHOLOGY

## 2021-09-16 PROCEDURE — 36415 COLL VENOUS BLD VENIPUNCTURE: CPT | Performed by: PATHOLOGY

## 2021-09-16 PROCEDURE — 99215 OFFICE O/P EST HI 40 MIN: CPT | Performed by: NURSE PRACTITIONER

## 2021-09-16 PROCEDURE — 84443 ASSAY THYROID STIM HORMONE: CPT | Performed by: PATHOLOGY

## 2021-09-16 PROCEDURE — 85652 RBC SED RATE AUTOMATED: CPT | Performed by: PATHOLOGY

## 2021-09-16 PROCEDURE — 74019 RADEX ABDOMEN 2 VIEWS: CPT | Mod: GC | Performed by: RADIOLOGY

## 2021-09-16 RX ORDER — AMITRIPTYLINE HYDROCHLORIDE 10 MG/1
10 TABLET ORAL AT BEDTIME
Qty: 90 TABLET | Refills: 11 | Status: SHIPPED | OUTPATIENT
Start: 2021-09-16 | End: 2021-09-17

## 2021-09-16 RX ORDER — ZOLMITRIPTAN 5 MG/1
5 TABLET, FILM COATED ORAL
Qty: 9 TABLET | Refills: 11 | Status: SHIPPED | OUTPATIENT
Start: 2021-09-16 | End: 2022-08-03

## 2021-09-16 RX ORDER — MONTELUKAST SODIUM 10 MG/1
TABLET ORAL
Qty: 90 TABLET | Refills: 3 | Status: ON HOLD | OUTPATIENT
Start: 2021-09-16 | End: 2023-01-08

## 2021-09-16 RX ORDER — AMITRIPTYLINE HYDROCHLORIDE 50 MG/1
50 TABLET ORAL AT BEDTIME
Qty: 90 TABLET | Refills: 3 | Status: SHIPPED | OUTPATIENT
Start: 2021-09-16 | End: 2021-09-19

## 2021-09-16 ASSESSMENT — PAIN SCALES - GENERAL: PAINLEVEL: SEVERE PAIN (7)

## 2021-09-16 NOTE — NURSING NOTE
Chief Complaint   Patient presents with     Physical     general physical/check in, lab work, discuss her gi port     Recheck Medication     med refill       Usha Vogt, EMT at 11:46 AM on 9/16/2021

## 2021-09-17 ENCOUNTER — TELEPHONE (OUTPATIENT)
Dept: INTERNAL MEDICINE | Facility: CLINIC | Age: 56
End: 2021-09-17

## 2021-09-17 ENCOUNTER — PATIENT OUTREACH (OUTPATIENT)
Dept: GASTROENTEROLOGY | Facility: CLINIC | Age: 56
End: 2021-09-17

## 2021-09-17 DIAGNOSIS — R51.9 HEADACHE DISORDER: Primary | ICD-10-CM

## 2021-09-17 RX ORDER — AMITRIPTYLINE HYDROCHLORIDE 50 MG/1
50 TABLET ORAL AT BEDTIME
Qty: 90 TABLET | Refills: 3 | Status: SHIPPED | OUTPATIENT
Start: 2021-09-17 | End: 2022-05-24

## 2021-09-17 NOTE — PROGRESS NOTES
S:  Dinora had a GJ tube placed on 9/7/21.  Last night at 12 midnight and progressed through the night.   She is scheduled for an abdominal xray and labs which were ordered by Dr. Park. She rates her pain now as a 7/10.      Her tube feedings are currently at 55 ml an hour as she felt nauseous with a higher rate.     She has been experiencing depressed mood, frequent crying, similar to what she experienced after the birth of her third child. At that time she was found to have low thyroid and she improved once she started on thyroid replacement.    She is interested in renewing her Zomig.  Her increase amitriptyline dose has been helpful.    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     Incisional hernia, without obstruction or gangrene     Adhesive capsulitis of shoulder, unspecified laterality     S/P hernia repair     Headache, chronic migraine without aura     Chronic pain syndrome     Iron deficiency anemia     Hypoglycemia     Adult failure to thrive            Past Medical History:   Diagnosis Date     Allergic rhinitis, cause unspecified      Allergy to other foods     strawberries, apples, celeries, alice, watermelon     Arthritis     left knee     Choledocholithiasis     long after cholecystectomy     Chronic abdominal pain      Chronic constipation      Chronic nausea      Chronic pancreatitis (H)      Degeneration of lumbar or lumbosacral  intervertebral disc      Esophageal reflux     w/ hiatal hernia     Gastroparesis      Hiatal hernia      History of pituitary adenoma     s/p resection     Hypothyroidism      Migraines      Mild hyperlipidemia      On tube feeding diet     presence of GJ tube     Pancreatic disease     PD stricture, 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 endometriosis growth     APPENDECTOMY       C  DELIVERY ONLY       C  DELIVERY ONLY      repeat c section with incidental cystotomy with repair     C EXCIS PITUITARY,TRANSNASAL/SEPTAL      pituitary tumor removed for diabetes insipidus     C TOTAL ABDOM HYSTERECTOMY      w/ bilateral salpingoophorectomy       SECTION       COLONOSCOPY       ENDOSCOPIC RETROGRADE CHOLANGIOPANCREATOGRAM N/A 2015    Procedure: ENDOSCOPIC RETROGRADE CHOLANGIOPANCREATOGRAM;  Surgeon: Mandeep Park MD;  [facility]: [facility]     ENDOSCOPIC RETROGRADE CHOLANGIOPANCREATOGRAM N/A 2016    Procedure: COMBINED ENDOSCOPIC RETROGRADE CHOLANGIOPANCREATOGRAPHY, PLACE TUBE/STENT;  Surgeon: Mandeep Park MD;  [facility]     ENDOSCOPIC RETROGRADE CHOLANGIOPANCREATOGRAM N/A 3/17/2016    Procedure: COMBINED ENDOSCOPIC RETROGRADE CHOLANGIOPANCREATOGRAPHY, REMOVE FOREIGN BODY OR STENT/TUBE;  Surgeon: Mandeep Park MD;  [facility]: [facility]     ENDOSCOPIC RETROGRADE CHOLANGIOPANCREATOGRAM N/A 2016    Procedure: COMBINED ENDOSCOPIC RETROGRADE CHOLANGIOPANCREATOGRAPHY, PLACE TUBE/STENT;  Surgeon: Mandeep Park MD;  [facility]     ENDOSCOPIC RETROGRADE CHOLANGIOPANCREATOGRAM N/A 2016    Procedure: COMBINED ENDOSCOPIC RETROGRADE CHOLANGIOPANCREATOGRAPHY, REMOVE FOREIGN BODY OR  STENT/TUBE;  Surgeon: Mandeep Park MD;  [facility]     ENDOSCOPIC ULTRASOUND UPPER GASTROINTESTINAL TRACT (GI) N/A 10/3/2016    Procedure: ENDOSCOPIC ULTRASOUND, ESOPHAGOSCOPY / UPPER GASTROINTESTINAL TRACT (GI);  Surgeon: Guru Jose Rodas MD;  [facility]     ESOPHAGOSCOPY, GASTROSCOPY, DUODENOSCOPY (EGD), COMBINED N/A 6/24/2015    Procedure: COMBINED ESOPHAGOSCOPY, GASTROSCOPY, DUODENOSCOPY (EGD), REMOVE FOREIGN BODY;  Surgeon: Mandeep Park MD;  [facility]     ESOPHAGOSCOPY, GASTROSCOPY, DUODENOSCOPY (EGD), COMBINED N/A 10/25/2015    Procedure: COMBINED ESOPHAGOSCOPY, GASTROSCOPY, DUODENOSCOPY (EGD);  Surgeon: Sammy Amaro MD;  [facility]     ESOPHAGOSCOPY, GASTROSCOPY, DUODENOSCOPY (EGD), COMBINED N/A 10/25/2015    Procedure: COMBINED ESOPHAGOSCOPY, GASTROSCOPY, DUODENOSCOPY (EGD), BIOPSY SINGLE OR MULTIPLE;  Surgeon: Sammy Amaro MD;  [facility]     ESOPHAGOSCOPY, GASTROSCOPY, DUODENOSCOPY (EGD), DILATATION, COMBINED       EXCISE LESION TRUNK N/A 4/17/2017    Procedure: EXCISE LESION TRUNK;  Removal of Abdominal Foreign Body;  Surgeon: Nestor Phoenix MD;  [facility]     HC ESOPH/GAS REFLUX TEST W NASAL IMPED >1 HR N/A 11/19/2015    Procedure: ESOPHAGEAL IMPEDENCE FUNCTION TEST WITH 24 HOUR PH GREATER THAN 1 HOUR;  Surgeon: Thiago Apple MD;  [facility]     HC UGI ENDOSCOPY DIAG W BIOPSY  9/17/08     HC UGI ENDOSCOPY DIAG W BIOPSY  9/27/12     HC UGI ENDOSCOPY W ESOPHAGEAL DILATION BALLOON <30MM  9/17/08     HC UGI ENDOSCOPY W EUS N/A 5/5/2015    Procedure: COMBINED ENDOSCOPIC ULTRASOUND, ESOPHAGOSCOPY, GASTROSCOPY, DUODENOSCOPY (EGD);  Surgeon: Wm Dueñas MD;  [facility]     HC WRIST ARTHROSCOP,RELEASE XVERS LIG Bilateral 12/17/08     INJECT TRANSVERSUS ABDOMINIS PLANE (TAP) BLOCK BILATERAL Left 9/22/2016    Procedure: INJECT TRANSVERSUS ABDOMINIS PLANE (TAP) BLOCK BILATERAL;  Surgeon: Meenu  Dickson PRITCHARD MD;  [facility]     laparoscopic pineda       LAPAROSCOPIC HERNIORRHAPHY INCISIONAL N/A 2018    Procedure: LAPAROSCOPIC HERNIORRHAPHY INCISIONAL;  Laparoscopic Incisional Hernia Repair with Symbotex Mesh Implant;  Surgeon: Nestor Phoenix MD;  [facility]     LAPAROSCOPIC PANCREATECTOMY, TRANSPLANT AUTO ISLET CELL N/A 2016    Procedure: LAPAROSCOPIC PANCREATECTOMY, TRANSPLANT AUTO ISLET CELL;  Surgeon: Nestor Phoenix MD;  [facility]     REPLACE GASTROJEJUNOSTOMY TUBE, PERCUTANEOUS N/A 2021    Procedure: GASTROJEJUNOSTOMY TUBE PLACEMENT, PERCUTANEOUS, WITH GASTROPEXY;  Surgeon: Mandeep Park MD;  [facility]     REPLACE JEJUNOSTOMY TUBE, PERCUTANEOUS N/A 9/10/2021    Procedure: UPPER ENDOSCOPY, REPLACEMENT OF PERCUTANEOUS GASTROJEJUNOSTOMY TUBE, T-TAG GASTROPEXY;  Surgeon: Zackery Montoya MD;  Location[facility]     transphenoidal pituitary resection              Social History     Tobacco Use     Smoking status: Former Smoker     Packs/day: 0.50     Years: 6.00     Pack years: 3.00     Types: Cigarettes     Start date: 1985     Quit date: 1992     Years since quittin.7     Smokeless tobacco: Never Used     Tobacco comment: no 2nd hand   Substance Use Topics     Alcohol use: Not Currently     Alcohol/week: 3.0 - 6.0 standard drinks     Types: 1 - 2 Glasses of wine, 1 - 2 Cans of beer, 1 - 2 Shots of liquor per week     Comment: none since IGG            Family History   Problem Relation Age of Onset     Eye Disorder Father         cataract, detached retina     Myocardial Infarction Father 60     Lipids Father      Cerebrovascular Disease Father      Depression Father      Substance Abuse Father      Anesthesia Reaction Father         stroke right after surgery     Cataracts Father      Osteoarthritis Father      Ulcerative Colitis Father      Lipids Mother      Hypertension Mother      Thyroid Disease Mother      Depression Mother       Angina Mother      GERD Mother      Skin Cancer Mother      Eye Disorder Son         ptosis     Eye Disorder Paternal Grandmother         cataract     Eye Disorder Paternal Grandfather         cataract     Diabetes Paternal Grandfather      Eye Disorder Maternal Grandmother         cataract     Thyroid Disease Maternal Grandmother      Coronary Artery Disease Sister         angioplasty     GERD Sister      Substance Abuse Sister      Depression Sister      Depression Son      Anxiety Disorder Son      Thyroid Disease Sister      Diabetes Maternal Grandfather      Depression Nephew      Anxiety Disorder Nephew      Thyroid Disease Nephew      Diabetes Type 2  Cousin         paternal cousin     Heart Disease Paternal Aunt      Diabetes Paternal Aunt      Diabetes Paternal Uncle      Heart Disease Paternal Uncle                Allergies   Allergen Reactions     Apple Anaphylaxis     Corticosteroids Other (See Comments)     All oral, IV and injectable steroids are contraindicated (unless in life threatening situations) in [facility] transplant recipients. They can cause irreversible loss of islet cell function. Please contact patient's transplant care coordinator ADI Gaffney RN at [phone]/pager [phone] and/or endocrinologist prior to administration.       Depakote [Divalproex Sodium] Other (See Comments)     Chest pain     Zithromax [Azithromycin Dihydrate] Anaphylaxis     Noted in 4/7/08 ER     Darvocet [Propoxyphene N-Apap] Itching     Morphine Nausea and Vomiting and Rash     Nalbuphine Hcl Rash     RASH :nubaine     Zosyn [Piperacillin-Tazobactam In D5w] Rash     Possible allergy, did have a diffuse rash that seemed drug related but could have also been related to soap in the hospital.      Bactrim [Sulfamethoxazole W-Trimethoprim] Other (See Comments) and Nausea and Vomiting     Severely low liver function.     Cats      Compazine [Prochlorperazine] Other (See Comments)     Twitching. Takes Benedryl  and is fine     Dust Mites      Grass      Ragweeds      Tape [Adhesive Tape] Blisters     Tramadol      Trees      Zofran [Ondansetron] Other (See Comments)     migraine     Flagyl [Metronidazole] Hives and Rash            Current Outpatient Medications   Medication Sig Dispense Refill     amitriptyline (ELAVIL) 10 MG tablet Take 1 tablet (10 mg) by mouth At Bedtime Take 40 mg at bedtime 90 tablet 11     amitriptyline (ELAVIL) 50 MG tablet Take 1 tablet (50 mg) by mouth At Bedtime 90 tablet 3     azelastine (ASTELIN) 0.1 % nasal spray Spray 1 spray into both nostrils 2 times daily 1 Bottle 11     blood glucose (PAT CONTOUR NEXT) test strip Use to test blood sugar 6 times daily or as directed. 2 Box 11     blood glucose monitoring (PAT MICROLET) lancets Use to test blood sugar 6-8 times daily or as directed. 100 each 11     cetirizine (ZYRTEC) 10 MG tablet Take 1 tablet (10 mg) by mouth daily (Patient taking differently: Take 10 mg by mouth every morning ) 90 tablet 3     Continuous Blood Gluc Sensor (FREESTYLE LISA 2 SENSOR) MISC 1 each every 14 days 2 each 11     cyclobenzaprine (FLEXERIL) 5 MG tablet Take 1 tablet (5 mg) by mouth 3 times daily 12 tablet 0     diphenhydrAMINE (BENADRYL ALLERGY) 25 MG capsule Take 1 capsule (25 mg) by mouth every 6 hours as needed for itching or allergies 56 capsule 0     estradiol (VAGIFEM) 10 MCG TABS vaginal tablet Place 1 tablet (10 mcg) vaginally twice a week 24 tablet 3     famotidine (PEPCID) 20 MG tablet Take 1 tablet (20 mg) by mouth 2 times daily (Patient taking differently: Take 20 mg by mouth every morning ) 180 tablet 3     FOLIC ACID PO Take 1 mg by mouth every morning        glucose 40 % GEL gel Take 15-30 g by mouth every 15 minutes as needed for low blood sugar 3 Tube 2     ibuprofen (ADVIL/MOTRIN) 400 MG tablet Take 1 tablet (400 mg) by mouth every 6 hours as needed for moderate pain (Patient taking differently: Take 400 mg by mouth every 6 hours as needed  for moderate pain ) 30 tablet 0     levothyroxine (SYNTHROID) 137 MCG tablet Take 1 tablet (137 mcg) by mouth daily (Patient taking differently: Take 137 mcg by mouth every morning ) 90 tablet 3     montelukast (SINGULAIR) 10 MG tablet TAKE 1 TABLET(10 MG) BY MOUTH AT BEDTIME 90 tablet 3     multivitamin CF formula (CHOICEFUL) capsule Take 1 tablet by mouth daily (Patient taking differently: Take 1 tablet by mouth every morning )  11     omeprazole (PRILOSEC) 40 MG DR capsule Take 1 capsule (40 mg) by mouth 2 times daily (Patient taking differently: Take 40 mg by mouth every evening ) 180 capsule 3     order for DME Equipment being ordered:Cefaly 1 Device 0     polyethylene glycol (MIRALAX) 17 GM/Dose powder Take 17 g (1 capful) by mouth daily (Patient taking differently: Take 1 capful by mouth every morning ) 507 g 11     prochlorperazine (COMPAZINE) 5 MG tablet Take 1 tablet (5 mg) by mouth every 6 hours as needed Take two 5 mg tablets by mouth every six hours as needed for nausea. 60 tablet 1     promethazine (PHENERGAN) 25 MG tablet Take 1 tablet (25 mg) by mouth daily as needed 60 tablet 1     Prucalopride Succinate (MOTEGRITY) 2 MG TABS Take 2 mg by mouth daily (Patient taking differently: Take 2 mg by mouth every morning ) 30 tablet 11     scopolamine (TRANSDERM) 1 MG/3DAYS 72 hr patch Place 1 patch onto the skin every 72 hours 10 patch 11     senna-docusate (SENOKOT-S;PERICOLACE) 8.6-50 MG per tablet Take 1-2 tablets by mouth 2 times daily (Patient taking differently: Take 1-2 tablets by mouth every morning ) 100 tablet 0     Sharps Container (BD SHARPS ) MISC 1 Container as needed 1 each 1     ZOLMitriptan (ZOMIG) 5 MG tablet Take 1 tablet (5 mg) by mouth at onset of headache for migraine 9 tablet 11        REVIEW OF SYSTEMS:  See above.        O:   /66 (BP Location: Right arm, Patient Position: Sitting, Cuff Size: Adult Regular)   Pulse 74   Wt 59.9 kg (132 lb)   SpO2 98%   BMI 20.67  kg/m    GENERAL APPEARANCE: healthy, alert and no distress  EYES: EOMI,  PERRL Glasses.   HENT: ear canals and TM's normal and nose and mouth without ulcers or lesions  RESP: lungs clear to auscultation - no rales, rhonchi or wheezes  CV: regular rates and rhythm, normal S1 S2, no S3 or S4 and no murmur, click or rub   ABDOMEN:  Tender to light palpation on the RUQ. Button site dressing with green/yellow pus. Immediate site is red. Thread was clipped and button removed.  Her pain decreased to a 4/10 within 5 minutes.   NEURO: WNL  MUSK:Grossly normal  SKIN: See above, o/w normal.   EXT: warm.  Edema NO   PSYCHE: Normal.     A/P:  Dinora was seen today for physical and recheck medication.    Diagnoses and all orders for this visit:    Hypothyroidism, unspecified type  -     TSH with free T4 reflex; Future    Headache disorder  -     ZOLMitriptan (ZOMIG) 5 MG tablet; Take 1 tablet (5 mg) by mouth at onset of headache for migraine  -     amitriptyline (ELAVIL) 50 MG tablet; Take 1 tablet (50 mg) by mouth At Bedtime  -     amitriptyline (ELAVIL) 10 MG tablet; Take 1 tablet (10 mg) by mouth At Bedtime Take 40 mg at bedtime    Chronic pain syndrome  -     amitriptyline (ELAVIL) 10 MG tablet; Take 1 tablet (10 mg) by mouth At Bedtime Take 40 mg at bedtime    Abdominal pain, generalized  -     CBC with platelets; Future  -     Erythrocyte sedimentation rate; Future    Seasonal allergies  -     montelukast (SINGULAIR) 10 MG tablet; TAKE 1 TABLET(10 MG) BY MOUTH AT BEDTIME    Exam Information    Exam Date Exam Time Accession # Performing Department Results    9/16/21  1:23 PM [id number] [facility]        PACS Images     Show images for X-ray Abdomen 2 vw       Study Result    Narrative & Impression   Exam: XR ABDOMEN 2VIEWS, 9/16/2021 1:23 PM     Indication: assessment of GJ tube position; Encounter for care related  to feeding tube     Comparison: ERCP dated  9/10/2021     Findings:   Gastrojejunostomy tube with tip in the jejunum. Postsurgical changes  of the abdomen. Small stool volume present. Nonobstructive bowel gas  pattern. Lung bases are clear. No acute osseous abnormalities.                                                                      Impression: Gastrojejunostomy tube tip in the jejunum.     I have personally reviewed the examination and initial interpretation  and I agree with the findings.     MACRINA CORRIGAN MD         SYSTEM ID:  [id number]       Order-Level Documents:    There are no order-level documents.          Total tiResults for MALINI DAVIS (MRN [id number]) as of 9/16/2021 21:49   Ref. Range 9/16/2021 13:08 9/16/2021 13:23   TSH Latest Ref Range: 0.40 - 4.00 mU/L 0.67    WBC Latest Ref Range: 4.0 - 11.0 10e3/uL 6.5    Hemoglobin Latest Ref Range: 11.7 - 15.7 g/dL 13.0    Hematocrit Latest Ref Range: 35.0 - 47.0 % 39.7    Platelet Count Latest Ref Range: 150 - 450 10e3/uL 387    RBC Count Latest Ref Range: 3.80 - 5.20 10e6/uL 4.15    MCV Latest Ref Range: 78 - 100 fL 96    MCH Latest Ref Range: 26.5 - 33.0 pg 31.3    MCHC Latest Ref Range: 31.5 - 36.5 g/dL 32.7    RDW Latest Ref Range: 10.0 - 15.0 % 13.5    Sed Rate Latest Ref Range: 0 - 30 mm/hr 10    XR ABDOMEN 2 VW Unknown  Rpt       Time spent today with this patient including chart review, exam time with patient and documentation :   40 minutes    Latasha INGRAM, CNP                  Answers for HPI/ROS submitted by the patient on 9/9/2021  General Symptoms: No  Skin Symptoms: Yes  HENT Symptoms: No  EYE SYMPTOMS: Yes  HEART SYMPTOMS: No  LUNG SYMPTOMS: No  INTESTINAL SYMPTOMS: Yes  URINARY SYMPTOMS: Yes  GYNECOLOGIC SYMPTOMS: No  BREAST SYMPTOMS: No  SKELETAL SYMPTOMS: No  BLOOD SYMPTOMS: No  NERVOUS SYSTEM SYMPTOMS: No  MENTAL HEALTH SYMPTOMS: Yes  Changes in hair: No  Changes in moles/birth marks: No  Itching: Yes  Rashes: No  Changes in nails: No  Acne: No  Hair in places  you don't want it: No  Change in facial hair: No  Warts: No  Non-healing sores: No  Scarring: No  Flaking of skin: No  Color changes of hands/feet in cold : No  Sun sensitivity: No  Skin thickening: No  Eye pain: Yes  Vision loss: No  Dry eyes: Yes  Watery eyes: No  Eye bulging: Yes  Double vision: No  Flashing of lights: Yes  Spots: Yes  Floaters: No  Redness: Yes  Crossed eyes: No  Tunnel Vision: No  Yellowing of eyes: No  Eye irritation: No  Heart burn or indigestion: Yes  Nausea: Yes  Vomiting: Yes  Abdominal pain: Yes  Bloating: Yes  Constipation: Yes  Diarrhea: Yes  Blood in stool: No  Black stools: No  Rectal or Anal pain: No  Fecal incontinence: No  Yellowing of skin or eyes: No  Vomit with blood: No  Change in stools: Yes  Trouble holding urine or incontinence: No  Pain or burning: No  Trouble starting or stopping: Yes  Increased frequency of urination: No  Blood in urine: No  Decreased frequency of urination: No  Frequent nighttime urination: Yes  Flank pain: No  Difficulty emptying bladder: No  Nervous or Anxious: Yes  Depression: Yes  Trouble sleeping: Yes  Trouble thinking or concentrating: No  Mood changes: Yes  Panic attacks: No

## 2021-09-17 NOTE — PROGRESS NOTES
Was asked by another RNCC to call #[phone] to answer questions regarding patient's feeding tube.   No answer at this number  Left non detailed message with [facility] call back number    Gina Arrieta RN, BSN,   [facility]  Care coordinator

## 2021-09-17 NOTE — TELEPHONE ENCOUNTER
Latasha,    There are 2 different Rx of Amitriptyline. Please clarify the dosage. Thank you.    Soon-Mi

## 2021-09-18 ENCOUNTER — HOSPITAL ENCOUNTER (INPATIENT)
Facility: CLINIC | Age: 56
LOS: 4 days | Discharge: HOME | End: 2021-09-24
Attending: EMERGENCY MEDICINE | Admitting: EMERGENCY MEDICINE
Payer: COMMERCIAL

## 2021-09-18 ENCOUNTER — TELEPHONE (OUTPATIENT)
Dept: GASTROENTEROLOGY | Facility: CLINIC | Age: 56
End: 2021-09-18

## 2021-09-18 ENCOUNTER — APPOINTMENT (OUTPATIENT)
Dept: CT IMAGING | Facility: CLINIC | Age: 56
End: 2021-09-18
Attending: EMERGENCY MEDICINE
Payer: COMMERCIAL

## 2021-09-18 DIAGNOSIS — R62.7 ADULT FAILURE TO THRIVE: Primary | ICD-10-CM

## 2021-09-18 DIAGNOSIS — K86.1 CHRONIC PANCREATITIS, UNSPECIFIED PANCREATITIS TYPE (H): ICD-10-CM

## 2021-09-18 DIAGNOSIS — R10.9 ABDOMINAL SPASMS: ICD-10-CM

## 2021-09-18 DIAGNOSIS — T85.848A PAIN AROUND PERCUTANEOUS ENDOSCOPIC GASTROSTOMY (PEG) TUBE SITE, INITIAL ENCOUNTER: ICD-10-CM

## 2021-09-18 DIAGNOSIS — K31.84 GASTROPARESIS: ICD-10-CM

## 2021-09-18 DIAGNOSIS — K94.23 MALFUNCTION OF GASTROSTOMY TUBE (H): ICD-10-CM

## 2021-09-18 DIAGNOSIS — R10.84 ABDOMINAL PAIN, GENERALIZED: ICD-10-CM

## 2021-09-18 LAB
ACANTHOCYTES BLD QL SMEAR: SLIGHT
ALBUMIN SERPL-MCNC: 3.8 G/DL (ref 3.4–5)
ALP SERPL-CCNC: 133 U/L (ref 40–150)
ALT SERPL W P-5'-P-CCNC: 77 U/L (ref 0–50)
ANION GAP SERPL CALCULATED.3IONS-SCNC: 4 MMOL/L (ref 3–14)
AST SERPL W P-5'-P-CCNC: 29 U/L (ref 0–45)
BASOPHILS # BLD AUTO: 0.1 10E3/UL (ref 0–0.2)
BASOPHILS NFR BLD AUTO: 1 %
BILIRUB SERPL-MCNC: 0.3 MG/DL (ref 0.2–1.3)
BUN SERPL-MCNC: 13 MG/DL (ref 7–30)
BURR CELLS BLD QL SMEAR: SLIGHT
CALCIUM SERPL-MCNC: 9.2 MG/DL (ref 8.5–10.1)
CHLORIDE BLD-SCNC: 103 MMOL/L (ref 94–109)
CO2 SERPL-SCNC: 30 MMOL/L (ref 20–32)
CREAT SERPL-MCNC: 0.73 MG/DL (ref 0.52–1.04)
EOSINOPHIL # BLD AUTO: 0.4 10E3/UL (ref 0–0.7)
EOSINOPHIL NFR BLD AUTO: 7 %
ERYTHROCYTE [DISTWIDTH] IN BLOOD BY AUTOMATED COUNT: 13.3 % (ref 10–15)
GFR SERPL CREATININE-BSD FRML MDRD: >90 ML/MIN/1.73M2
GLUCOSE BLD-MCNC: 95 MG/DL (ref 70–99)
HCT VFR BLD AUTO: 39.8 % (ref 35–47)
HGB BLD-MCNC: 13.6 G/DL (ref 11.7–15.7)
HOLD SPECIMEN: NORMAL
IMM GRANULOCYTES # BLD: 0 10E3/UL
IMM GRANULOCYTES NFR BLD: 0 %
LACTATE SERPL-SCNC: 0.7 MMOL/L (ref 0.7–2)
LIPASE SERPL-CCNC: 12 U/L (ref 73–393)
LYMPHOCYTES # BLD AUTO: 1.9 10E3/UL (ref 0.8–5.3)
LYMPHOCYTES NFR BLD AUTO: 34 %
MCH RBC QN AUTO: 31.7 PG (ref 26.5–33)
MCHC RBC AUTO-ENTMCNC: 34.2 G/DL (ref 31.5–36.5)
MCV RBC AUTO: 93 FL (ref 78–100)
MONOCYTES # BLD AUTO: 1.2 10E3/UL (ref 0–1.3)
MONOCYTES NFR BLD AUTO: 22 %
NEUTROPHILS # BLD AUTO: 2 10E3/UL (ref 1.6–8.3)
NEUTROPHILS NFR BLD AUTO: 36 %
NRBC # BLD AUTO: 0 10E3/UL
NRBC BLD AUTO-RTO: 0 /100
PLAT MORPH BLD: ABNORMAL
PLATELET # BLD AUTO: 426 10E3/UL (ref 150–450)
POTASSIUM BLD-SCNC: 3.9 MMOL/L (ref 3.4–5.3)
PROT SERPL-MCNC: 7.7 G/DL (ref 6.8–8.8)
RBC # BLD AUTO: 4.29 10E6/UL (ref 3.8–5.2)
RBC MORPH BLD: ABNORMAL
SODIUM SERPL-SCNC: 137 MMOL/L (ref 133–144)
TARGETS BLD QL SMEAR: SLIGHT
WBC # BLD AUTO: 5.4 10E3/UL (ref 4–11)

## 2021-09-18 PROCEDURE — 85025 COMPLETE CBC W/AUTO DIFF WBC: CPT | Performed by: EMERGENCY MEDICINE

## 2021-09-18 PROCEDURE — 250N000011 HC RX IP 250 OP 636: Performed by: EMERGENCY MEDICINE

## 2021-09-18 PROCEDURE — 96376 TX/PRO/DX INJ SAME DRUG ADON: CPT | Performed by: EMERGENCY MEDICINE

## 2021-09-18 PROCEDURE — 96375 TX/PRO/DX INJ NEW DRUG ADDON: CPT | Performed by: EMERGENCY MEDICINE

## 2021-09-18 PROCEDURE — 74177 CT ABD & PELVIS W/CONTRAST: CPT | Mod: 26 | Performed by: RADIOLOGY

## 2021-09-18 PROCEDURE — 82040 ASSAY OF SERUM ALBUMIN: CPT | Performed by: EMERGENCY MEDICINE

## 2021-09-18 PROCEDURE — 99285 EMERGENCY DEPT VISIT HI MDM: CPT | Performed by: EMERGENCY MEDICINE

## 2021-09-18 PROCEDURE — 36415 COLL VENOUS BLD VENIPUNCTURE: CPT | Performed by: EMERGENCY MEDICINE

## 2021-09-18 PROCEDURE — 74177 CT ABD & PELVIS W/CONTRAST: CPT

## 2021-09-18 PROCEDURE — 83605 ASSAY OF LACTIC ACID: CPT | Performed by: EMERGENCY MEDICINE

## 2021-09-18 PROCEDURE — 250N000009 HC RX 250: Performed by: EMERGENCY MEDICINE

## 2021-09-18 PROCEDURE — 82247 BILIRUBIN TOTAL: CPT | Performed by: EMERGENCY MEDICINE

## 2021-09-18 PROCEDURE — 83036 HEMOGLOBIN GLYCOSYLATED A1C: CPT | Performed by: PHYSICIAN ASSISTANT

## 2021-09-18 PROCEDURE — 99285 EMERGENCY DEPT VISIT HI MDM: CPT | Mod: 25 | Performed by: EMERGENCY MEDICINE

## 2021-09-18 PROCEDURE — 83690 ASSAY OF LIPASE: CPT | Performed by: EMERGENCY MEDICINE

## 2021-09-18 PROCEDURE — 96374 THER/PROPH/DIAG INJ IV PUSH: CPT | Performed by: EMERGENCY MEDICINE

## 2021-09-18 RX ORDER — HYDROMORPHONE HCL IN WATER/PF 6 MG/30 ML
0.2 PATIENT CONTROLLED ANALGESIA SYRINGE INTRAVENOUS
Status: COMPLETED | OUTPATIENT
Start: 2021-09-18 | End: 2021-09-18

## 2021-09-18 RX ORDER — IOPAMIDOL 755 MG/ML
80 INJECTION, SOLUTION INTRAVASCULAR ONCE
Status: COMPLETED | OUTPATIENT
Start: 2021-09-18 | End: 2021-09-18

## 2021-09-18 RX ORDER — HYDROMORPHONE HYDROCHLORIDE 1 MG/ML
0.5 INJECTION, SOLUTION INTRAMUSCULAR; INTRAVENOUS; SUBCUTANEOUS ONCE
Status: COMPLETED | OUTPATIENT
Start: 2021-09-18 | End: 2021-09-18

## 2021-09-18 RX ORDER — DIPHENHYDRAMINE HYDROCHLORIDE 50 MG/ML
25 INJECTION INTRAMUSCULAR; INTRAVENOUS ONCE
Status: COMPLETED | OUTPATIENT
Start: 2021-09-18 | End: 2021-09-18

## 2021-09-18 RX ADMIN — HYDROMORPHONE HYDROCHLORIDE 0.5 MG: 1 INJECTION, SOLUTION INTRAMUSCULAR; INTRAVENOUS; SUBCUTANEOUS at 21:40

## 2021-09-18 RX ADMIN — SODIUM CHLORIDE, PRESERVATIVE FREE 60 ML: 5 INJECTION INTRAVENOUS at 22:30

## 2021-09-18 RX ADMIN — IOPAMIDOL 80 ML: 755 INJECTION, SOLUTION INTRAVENOUS at 22:30

## 2021-09-18 RX ADMIN — HYDROMORPHONE HYDROCHLORIDE 0.2 MG: 0.2 INJECTION, SOLUTION INTRAMUSCULAR; INTRAVENOUS; SUBCUTANEOUS at 19:46

## 2021-09-18 RX ADMIN — DIPHENHYDRAMINE HYDROCHLORIDE 25 MG: 50 INJECTION, SOLUTION INTRAMUSCULAR; INTRAVENOUS at 22:56

## 2021-09-18 RX ADMIN — HYDROMORPHONE HYDROCHLORIDE 0.2 MG: 0.2 INJECTION, SOLUTION INTRAMUSCULAR; INTRAVENOUS; SUBCUTANEOUS at 18:04

## 2021-09-18 RX ADMIN — HYDROMORPHONE HYDROCHLORIDE 0.5 MG: 1 INJECTION, SOLUTION INTRAMUSCULAR; INTRAVENOUS; SUBCUTANEOUS at 23:31

## 2021-09-18 RX ADMIN — PROCHLORPERAZINE EDISYLATE 5 MG: 5 INJECTION INTRAMUSCULAR; INTRAVENOUS at 22:55

## 2021-09-18 ASSESSMENT — ENCOUNTER SYMPTOMS
NAUSEA: 0
ABDOMINAL PAIN: 1
VOMITING: 0
LIGHT-HEADEDNESS: 0
DIZZINESS: 0
BLOOD IN STOOL: 0
HEMATURIA: 0

## 2021-09-18 ASSESSMENT — MIFFLIN-ST. JEOR: SCORE: 1217.31

## 2021-09-18 NOTE — ED PROVIDER NOTES
ED Provider Note  [facility]      History     Chief Complaint   Patient presents with     Gtube Problem     The history is provided by the patient and medical records.     Dinora Mcghee is a 56 year old female with a PMH notable for hypothyroidism, GERD, history of pituitary adenoma status post resection, IgG for pseudotumor of liver, chronic pancreatitis s/p TP AIT, chronic nausea and vomiting, recently diagnosed with gastroparesis and PEG placement, who was admitted for 5 days in mid September with PEG tube placement, followed by replacement of percutaneous GJ tube, complicated by displacement of the GJ tube, ultimately undergoing EGD with GI for replacement of PEG J-tube with T tag gastropexy on 9/10, presenting now with worsening pain at the enteric tube sites and some fullness in this area.    Patient has pain at the enteric tube site as mentioned, worse with movement, says the skin feels raw, yesterday had noticed some blood in the tube itself that has since resolved has not had any melena.  Outside of that discomfort she will feel like as though the tube wants to go into her abdomen somewhat and then will pop out which causes severe pain that she rates at 10-20 out of 10 when that type of situation happens.  She reports that her home health team thought perhaps the tube itself was too tight in the abdomen.  It seemed to have been functioning well, but just positionally so forth is very uncomfortable for her.  Outside of the abdominal pain and the discomfort around the enteric tube itself she says that she is doing great.  No change to bowel or bladder.  No nausea or vomiting.  No blood in the stools or urine, no melena.  No lightheadedness, dizziness, syncope or near syncope.  She did have a fever for 1 day on Friday but this was immediately following her Covid vaccination quickly resolved on his own and has not had any recurrent issues.  Again outside of the abdominal discomfort  feels quite well without any symptoms or concerns.  No other new symptoms or complaints at this time.  Please see ROS for further details.    Patient was in contact with her usual GI team who recommended she come in for evaluation.  GI fellow already here in the ED on site to see/evaluate the patient.  maintain pain control at home with OTC pain medications but this was not sufficient in doing so.  Patient reports that she has adverse reactions to things like oxycodone, etc. but tolerates Dilaudid well without issue.    Past Medical History  Past Medical History:   Diagnosis Date     Allergic rhinitis, cause unspecified      Allergy to other foods     strawberries, apples, celeries, alice, watermelon     Arthritis     left knee     Choledocholithiasis     long after cholecystectomy     Chronic abdominal pain      Chronic constipation      Chronic nausea      Chronic pancreatitis (H)      Degeneration of lumbar or lumbosacral intervertebral disc      Esophageal reflux     w/ hiatal hernia     Gastroparesis      Hiatal hernia      History of pituitary adenoma     s/p resection     Hypothyroidism      Migraines      Mild hyperlipidemia      On tube feeding diet     presence of GJ tube     Pancreatic disease     PD stricture, 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 endometriosis growth     APPENDECTOMY       C  DELIVERY ONLY       C  DELIVERY ONLY      repeat c section with incidental cystotomy with repair     C EXCIS PITUITARY,TRANSNASAL/SEPTAL      pituitary tumor removed for diabetes insipidus     C TOTAL ABDOM HYSTERECTOMY      w/ bilateral salpingoophorectomy       SECTION       COLONOSCOPY       ENDOSCOPIC RETROGRADE  CHOLANGIOPANCREATOGRAM N/A 6/2/2015    Procedure: ENDOSCOPIC RETROGRADE CHOLANGIOPANCREATOGRAM;  Surgeon: Mandeep Park MD;  [facility]: [facility]     ENDOSCOPIC RETROGRADE CHOLANGIOPANCREATOGRAM N/A 2/9/2016    Procedure: COMBINED ENDOSCOPIC RETROGRADE CHOLANGIOPANCREATOGRAPHY, PLACE TUBE/STENT;  Surgeon: Mandeep Park MD;  Location: [facility]     ENDOSCOPIC RETROGRADE CHOLANGIOPANCREATOGRAM N/A 3/17/2016    Procedure: COMBINED ENDOSCOPIC RETROGRADE CHOLANGIOPANCREATOGRAPHY, REMOVE FOREIGN BODY OR STENT/TUBE;  Surgeon: Mandeep Park MD;  Location: [facility]     ENDOSCOPIC RETROGRADE CHOLANGIOPANCREATOGRAM N/A 8/2/2016    Procedure: COMBINED ENDOSCOPIC RETROGRADE CHOLANGIOPANCREATOGRAPHY, PLACE TUBE/STENT;  Surgeon: Mandeep Park MD;  [facility]     ENDOSCOPIC RETROGRADE CHOLANGIOPANCREATOGRAM N/A 8/26/2016    Procedure: COMBINED ENDOSCOPIC RETROGRADE CHOLANGIOPANCREATOGRAPHY, REMOVE FOREIGN BODY OR STENT/TUBE;  Surgeon: Mandeep Park MD;  [facility]     ENDOSCOPIC ULTRASOUND UPPER GASTROINTESTINAL TRACT (GI) N/A 10/3/2016    Procedure: ENDOSCOPIC ULTRASOUND, ESOPHAGOSCOPY / UPPER GASTROINTESTINAL TRACT (GI);  Surgeon: Guru Jose Rodas MD;  Location: [facility]     ESOPHAGOSCOPY, GASTROSCOPY, DUODENOSCOPY (EGD), COMBINED N/A 6/24/2015    Procedure: COMBINED ESOPHAGOSCOPY, GASTROSCOPY, DUODENOSCOPY (EGD), REMOVE FOREIGN BODY;  Surgeon: Mandeep Park MD;  [facility]     ESOPHAGOSCOPY, GASTROSCOPY, DUODENOSCOPY (EGD), COMBINED N/A 10/25/2015    Procedure: COMBINED ESOPHAGOSCOPY, GASTROSCOPY, DUODENOSCOPY (EGD);  Surgeon: Sammy Amaro MD;  [facility]     ESOPHAGOSCOPY, GASTROSCOPY, DUODENOSCOPY (EGD), COMBINED N/A 10/25/2015    Procedure: COMBINED ESOPHAGOSCOPY, GASTROSCOPY, DUODENOSCOPY (EGD), BIOPSY SINGLE OR MULTIPLE;  Surgeon: Sammy Amaro MD;  Location: [facility]     ESOPHAGOSCOPY, GASTROSCOPY, DUODENOSCOPY (EGD), DILATATION,  COMBINED       EXCISE LESION TRUNK N/A 4/17/2017    Procedure: EXCISE LESION TRUNK;  Removal of Abdominal Foreign Body;  Surgeon: Nestor Phoenix MD;  [facility]     HC ESOPH/GAS REFLUX TEST W NASAL IMPED >1 HR N/A 11/19/2015    Procedure: ESOPHAGEAL IMPEDENCE FUNCTION TEST WITH 24 HOUR PH GREATER THAN 1 HOUR;  Surgeon: Thiago Apple MD;  Location: [facility]     HC UGI ENDOSCOPY DIAG W BIOPSY  9/17/08     HC UGI ENDOSCOPY DIAG W BIOPSY  9/27/12     HC UGI ENDOSCOPY W ESOPHAGEAL DILATION BALLOON <30MM  9/17/08     HC UGI ENDOSCOPY W EUS N/A 5/5/2015    Procedure: COMBINED ENDOSCOPIC ULTRASOUND, ESOPHAGOSCOPY, GASTROSCOPY, DUODENOSCOPY (EGD);  Surgeon: Wm Dueñas MD;  Location: [facility]     HC WRIST ARTHROSCOP,RELEASE XVERS LIG Bilateral 12/17/08     INJECT TRANSVERSUS ABDOMINIS PLANE (TAP) BLOCK BILATERAL Left 9/22/2016    Procedure: INJECT TRANSVERSUS ABDOMINIS PLANE (TAP) BLOCK BILATERAL;  Surgeon: Dickson Corrigan MD;  [facility]     laparoscopic pineda  1995     LAPAROSCOPIC HERNIORRHAPHY INCISIONAL N/A 8/23/2018    Procedure: LAPAROSCOPIC HERNIORRHAPHY INCISIONAL;  Laparoscopic Incisional Hernia Repair with Symbotex Mesh Implant;  Surgeon: Nestor Phoenix MD;  [facility]     LAPAROSCOPIC PANCREATECTOMY, TRANSPLANT AUTO ISLET CELL N/A 12/28/2016    Procedure: LAPAROSCOPIC PANCREATECTOMY, TRANSPLANT AUTO ISLET CELL;  Surgeon: Nestor Phoenix MD;  [facility]     REPLACE GASTROJEJUNOSTOMY TUBE, PERCUTANEOUS N/A 9/7/2021    Procedure: GASTROJEJUNOSTOMY TUBE PLACEMENT, PERCUTANEOUS, WITH GASTROPEXY;  Surgeon: Mandeep Prak MD;  [facility]     REPLACE JEJUNOSTOMY TUBE, PERCUTANEOUS N/A 9/10/2021    Procedure: UPPER ENDOSCOPY, REPLACEMENT OF PERCUTANEOUS GASTROJEJUNOSTOMY TUBE, T-TAG GASTROPEXY;  Surgeon: Zackery Montoya MD;  Location: [facility]     transphenoidal pituitary resection  1990     amitriptyline (ELAVIL) 50 MG tablet  amitriptyline (ELAVIL) 50 MG  tablet  azelastine (ASTELIN) 0.1 % nasal spray  blood glucose (PAT CONTOUR NEXT) test strip  blood glucose monitoring (PAT MICROLET) lancets  cetirizine (ZYRTEC) 10 MG tablet  Continuous Blood Gluc Sensor (FREESTYLE LISA 2 SENSOR) MISC  cyclobenzaprine (FLEXERIL) 5 MG tablet  diphenhydrAMINE (BENADRYL ALLERGY) 25 MG capsule  estradiol (VAGIFEM) 10 MCG TABS vaginal tablet  famotidine (PEPCID) 20 MG tablet  FOLIC ACID PO  glucose 40 % GEL gel  ibuprofen (ADVIL/MOTRIN) 400 MG tablet  levothyroxine (SYNTHROID) 137 MCG tablet  montelukast (SINGULAIR) 10 MG tablet  multivitamin CF formula (CHOICEFUL) capsule  omeprazole (PRILOSEC) 40 MG DR capsule  order for DME  polyethylene glycol (MIRALAX) 17 GM/Dose powder  prochlorperazine (COMPAZINE) 5 MG tablet  promethazine (PHENERGAN) 25 MG tablet  Prucalopride Succinate (MOTEGRITY) 2 MG TABS  scopolamine (TRANSDERM) 1 MG/3DAYS 72 hr patch  senna-docusate (SENOKOT-S;PERICOLACE) 8.6-50 MG per tablet  Sharps Container (BD SHARPS ) MISC  ZOLMitriptan (ZOMIG) 5 MG tablet      Allergies   Allergen Reactions     Apple Anaphylaxis     Corticosteroids Other (See Comments)     All oral, IV and injectable steroids are contraindicated (unless in life threatening situations) in [facility] [employer] transplant recipients. They can cause irreversible loss of islet cell function. Please contact patient's transplant care coordinator ADI Gaffney RN at [phone]/pager [phone] and/or endocrinologist prior to administration.       Depakote [Divalproex Sodium] Other (See Comments)     Chest pain     Zithromax [Azithromycin Dihydrate] Anaphylaxis     Noted in 4/7/08 ER     Darvocet [Propoxyphene N-Apap] Itching     Morphine Nausea and Vomiting and Rash     Nalbuphine Hcl Rash     RASH :nubaine     Zosyn [Piperacillin-Tazobactam In D5w] Rash     Possible allergy, did have a diffuse rash that seemed drug related but could have also been related to soap in the hospital.      Bactrim  [Sulfamethoxazole W-Trimethoprim] Other (See Comments) and Nausea and Vomiting     Severely low liver function.     Cats      Compazine [Prochlorperazine] Other (See Comments)     Twitching. Takes Benedryl and is fine     Dust Mites      Grass      Ragweeds      Tape [Adhesive Tape] Blisters     Tramadol      Trees      Zofran [Ondansetron] Other (See Comments)     migraine     Flagyl [Metronidazole] Hives and Rash     Family History  Family History   Problem Relation Age of Onset     Eye Disorder Father         cataract, detached retina     Myocardial Infarction Father 60     Lipids Father      Cerebrovascular Disease Father      Depression Father      Substance Abuse Father      Anesthesia Reaction Father         stroke right after surgery     Cataracts Father      Osteoarthritis Father      Ulcerative Colitis Father      Lipids Mother      Hypertension Mother      Thyroid Disease Mother      Depression Mother      Angina Mother      GERD Mother      Skin Cancer Mother      Eye Disorder Son         ptosis     Eye Disorder Paternal Grandmother         cataract     Eye Disorder Paternal Grandfather         cataract     Diabetes Paternal Grandfather      Eye Disorder Maternal Grandmother         cataract     Thyroid Disease Maternal Grandmother      Coronary Artery Disease Sister         angioplasty     GERD Sister      Substance Abuse Sister      Depression Sister      Depression Son      Anxiety Disorder Son      Thyroid Disease Sister      Diabetes Maternal Grandfather      Depression Nephew      Anxiety Disorder Nephew      Thyroid Disease Nephew      Diabetes Type 2  Cousin         paternal cousin     Heart Disease Paternal Aunt      Diabetes Paternal Aunt      Diabetes Paternal Uncle      Heart Disease Paternal Uncle      Social History   Social History     Tobacco Use     Smoking status: Former Smoker     Packs/day: 0.50     Years: 6.00     Pack years: 3.00     Types: Cigarettes     Start date: 9/1/1985      "Quit date: 1992     Years since quittin.7     Smokeless tobacco: Never Used     Tobacco comment: no 2nd hand   Substance Use Topics     Alcohol use: Not Currently     Alcohol/week: 3.0 - 6.0 standard drinks     Types: 1 - 2 Glasses of wine, 1 - 2 Cans of beer, 1 - 2 Shots of liquor per week     Comment: none since IGG     Drug use: No      Past medical history, past surgical history, medications, allergies, family history, and social history were reviewed with the patient. No additional pertinent items.       Review of Systems   Constitutional: Negative for chills, diaphoresis, fatigue and fever.   Respiratory: Negative for cough and shortness of breath.    Cardiovascular: Negative.    Gastrointestinal: Positive for abdominal pain. Negative for blood in stool, nausea and vomiting.   Genitourinary: Negative for dysuria, flank pain, frequency and hematuria.   Musculoskeletal: Negative for back pain and neck pain.   Skin: Negative.    Neurological: Negative for dizziness, syncope, weakness and light-headedness.   Psychiatric/Behavioral: Negative for suicidal ideas.   All other systems reviewed and are negative.    A complete review of systems was performed with pertinent positives and negatives noted in the HPI, and all other systems negative.    Physical Exam   BP: 110/81  Pulse: 91  Temp: 97.7  F (36.5  C)  Resp: 16  Height: 170.2 cm (5' 7\")  Weight: 59 kg (130 lb)  SpO2: 100 %  Physical Exam  CONSTITUTIONAL: Well-developed and well-nourished. Awake and alert. Non-toxic appearance. No acute distress.   HENT:   - Head: Normocephalic and atraumatic.   - Ears: Hearing and external ear grossly normal.   - Nose: Nose normal. No rhinorrhea. No epistaxis.   - Mouth/Throat: MMM  EYES: Conjunctivae and lids are normal. No scleral icterus.   NECK: Normal range of motion and phonation normal. Neck supple.  No tracheal deviation, no stridor. No edema or erythema noted.  CARDIOVASCULAR: Normal rate, regular rhythm and no " appreciable abnormal heart sounds.  PULMONARY/CHEST: Normal work of breathing. No accessory muscle usage or stridor. No respiratory distress.  No appreciable abnormal breath sounds.  ABDOMEN: Soft, non-distended. No tenderness. No rigidity, rebound or guarding. Patient does have enteric tube in place in the mid abdomen, slightly more to the right.  May have some slightly raw/irritated skin but no adin cellulitis.  No induration of the skin, no clear fluctuance like abscess clearly appreciated but does have some fullness just above the tube and then some what she describes as severe discomfort to palpation all the way circumferentially around the tube itself, perhaps somewhat worse superiorly.  No other outward skin findings other than the slight irritation under the hub itself.  No blood noted in the tube, looks like normal enteric type contents.  No obvious bleeding or drainage externally from the enteric tube insertion site.   MUSCULOSKELETAL: Extremities warm and seemingly well perfused.   NEUROLOGIC: Awake, alert. Not disoriented. She displays no atrophy and no tremor. Normal tone. No seizure activity. GCS 15  SKIN: Skin is warm and dry. No rash noted. No diaphoresis. No pallor.   PSYCHIATRIC: Normal mood and affect. Speech and behavior normal. Thought processes linear. Cognition and memory are normal.   ED Course     ED Course as of Sep 19 0100   Sat Sep 18, 2021   1742 Gi fellow at bedside seeing patient      2322 Discussed w/ Dr. Park, he wonders if it's too tight, ok to loosen back about 1 cm more, and then obs and they will see in consult in the AM.            Assessments & Plan (with Medical Decision Making)   IMPRESSION: 56 year old female w/ PMH notable for hypothyroidism, GERD, history of pituitary adenoma status post resection, IgG for pseudotumor of liver, chronic pancreatitis s/p TP AIT, chronic nausea and vomiting, recently diagnosed with gastroparesis and PEG placement, who was admitted for 5  days in mid September with PEG tube placement, followed by replacement of percutaneous GJ tube, complicated by displacement of the GJ tube, ultimately undergoing EGD with GI for replacement of PEG J-tube with T tag gastropexy on 9/10, presenting now with worsening pain at the enteric tube sites and some fullness in this area.    Clinically, patient appears nontoxic, NAD.  Vitals grossly WNL.  Otherwise on examination there are some mild skin irritation under the hub, severe discomfort throughout the abdomen circumferentially around this, but not elsewhere throughout the abdomen.  No adin peritoneal findings though the discomfort is quite notable just around this tube.  No abnormal masses or pulsatility appreciated.  Ddx includes, but not limited to, misplaced/malpositioned tube, abdominal wall inflammation, infection, intraabdominal abnormality (gastritis/PUD, enteritis, colitis, UTI, etc.)    PLAN: Laboratory studies, urine studies, will start with abdominal imaging (GI which is like a x-ray to start with, may need to escalate as needed, and the GI fellow was already here in consult for the patient and will be discussing with the attending/the GI team for further recommendations.  Will also work on symptom management in the interim, disposition pending ED course.  - Risks/benefits of pursuing imaging reviewed and accepted.     RESULTS:  - Labs: Slight ALT elevation  - Urine: No sample yet provided  - Imaging: Imaging report pending, but images reviewed by myself and Dr. Park while awaiting reports.     INTERVENTIONS:   - IV Dilaudid  - I was able to lengthen/loosen the tube by about a centimeter as per Dr. Park's request when we discussed the patient.     RE-EVALUATION:  - Pt otherwise continues to do well here in the ED, no acute issues or apparent concerning changes in vitals or clinical appearance.    DISCUSSIONS:  - w/ EDOU: Reviewed patient/presentation, current state of workup/any pending studies. They  will admit for further evaluation/management, F/U pending studies as needed, coordinate w/ consulting services as needed. No additional requests/recommendations for workup/management for in the ED at this time.   - w/ Patient: I have reviewed the available findings, plan with the patient and her SO. They expressed understanding and agreement with this plan. All questions answered to the best of our ability at this time.     DISPOSITION/PLANNING:  - IMPRESSION:  Abdominal pain, pain near GJ tube   - DISPOSITION: Admit to ED obs for further evaluation/management  - PENDING: Finalized CT report/interp  - RECOMMENDATIONS: Serial abdominal exams, pain control, monitor enteric tube, GI consult (Panc intervention),/ Dr. Park to see in AM      ______________________________________________________________________          --  Darcy Jason MD  [facility] EMERGENCY DEPARTMENT  9/18/2021     Darcy Jason MD  09/20/21 0510

## 2021-09-18 NOTE — TELEPHONE ENCOUNTER
Called regarding PEGJ tube.     PEGJ tube placed 9/7/21 by Dr. Park, then has coiling so was revised by Dr. Montoya Friday 9/10/20.  For the past 3 days, has very painful at surgery site. The tube keep suck in into the body. Tilted the tube. Minimal leakage of tube feeding. Surrounding area is irritated.     Has been taking Flexeril with some help, but now is improved.   Home care nurse has been trying and could not trouble shoot.   Is going to [place] for a week. Has been putting bacitracin.     # PEGJ tube pain  # Possible too tight/ too loose of the bumper of the PEGJ tube, or infection.     Plan   - Will have patient come to the ED for assessment  - ED to obtain plain abdominal Xray for PEGJ position, please page GI fellow on call (me) once patient is here and got the Xray    Will call to ED for sign out.    Discussed with Dr. Xavier Flores MD  Gastroenterology/Hepatology Fellow  Page: [phone]

## 2021-09-18 NOTE — ED NOTES
Bed: IN02  Expected date: 9/18/21  Expected time:   Means of arrival:   Comments:  Dinora Loo percutaneous gastrostomy tube issues, when she gets here order an x-ray and paged the GI fellow to come look at the patient     Elver Duarte MD  09/18/21 4823

## 2021-09-18 NOTE — ED TRIAGE NOTES
Pain in GJT (gastroparesis) keeps flipping and going internal since Thursday, replaced on Friday but keeps happening  Had a fever yesterday after the covid vaccine

## 2021-09-18 NOTE — PROGRESS NOTES
"Patient is here at ED, after call that her PEGJ tube has pain around the site.    55 yo F with malnutrition, s/p PEGJ tube placed 9/7/21 by Dr. Park, then has coiling so was revised by Dr. Montoya Friday 9/10/20.  For the past 3 days, has very painful at surgery site. The tube keep suck in into the body. Tilted the tube. Minimal leakage of tube feeding. Surrounding area is irritated.     Patient has low grade fever post COVID vaccine yesterday. No longer having fever. Apart from pain around PEGJ  Tube, feeling well.     Physical exam  /81   Pulse 91   Temp 97.7  F (36.5  C) (Temporal)   Resp 16   Ht 1.702 m (5' 7\")   Wt 59 kg (130 lb)   SpO2 100%   BMI 20.36 kg/m    Abd: soft, not tender apart from surrounding PEGJ tube area. Bumper is tight close to the skin. Mild surrounding irritation, no pus or sign of infection.    # PEGJ tube pain  No need for Xray.   - IV hydromorphone 0.2 mg IV once  - bumper is moving from 4 cm to 4.4 cm with Ector/Catarina forceps. --> very pain. So was stopped.   - Will obtain CT with contrast for possible buried balloon.   - Patient might need observation for pain control.   - ED will talk to Dr. Park once the CT result came back for the further plan.    Discussed w/ Dr. Park.  Maren Flores MD  Gastroenterology/Hepatology Fellow  Page: [phone]    "

## 2021-09-19 LAB
ALBUMIN UR-MCNC: NEGATIVE MG/DL
APPEARANCE UR: CLEAR
BILIRUB UR QL STRIP: NEGATIVE
COLOR UR AUTO: ABNORMAL
GLUCOSE BLDC GLUCOMTR-MCNC: 106 MG/DL (ref 70–99)
GLUCOSE BLDC GLUCOMTR-MCNC: 118 MG/DL (ref 70–99)
GLUCOSE BLDC GLUCOMTR-MCNC: 162 MG/DL (ref 70–99)
GLUCOSE BLDC GLUCOMTR-MCNC: 75 MG/DL (ref 70–99)
GLUCOSE BLDC GLUCOMTR-MCNC: 78 MG/DL (ref 70–99)
GLUCOSE BLDC GLUCOMTR-MCNC: 89 MG/DL (ref 70–99)
GLUCOSE UR STRIP-MCNC: NEGATIVE MG/DL
HBA1C MFR BLD: 5.5 % (ref 0–5.6)
HGB UR QL STRIP: NEGATIVE
KETONES UR STRIP-MCNC: NEGATIVE MG/DL
LEUKOCYTE ESTERASE UR QL STRIP: ABNORMAL
MUCOUS THREADS #/AREA URNS LPF: PRESENT /LPF
NITRATE UR QL: NEGATIVE
PH UR STRIP: 5 [PH] (ref 5–7)
RBC URINE: 2 /HPF
SP GR UR STRIP: 1.01 (ref 1–1.03)
SQUAMOUS EPITHELIAL: 4 /HPF
TRANSITIONAL EPI: <1 /HPF
UROBILINOGEN UR STRIP-MCNC: NORMAL MG/DL
WBC URINE: 10 /HPF

## 2021-09-19 PROCEDURE — 250N000011 HC RX IP 250 OP 636: Performed by: PHYSICIAN ASSISTANT

## 2021-09-19 PROCEDURE — 81001 URINALYSIS AUTO W/SCOPE: CPT | Performed by: EMERGENCY MEDICINE

## 2021-09-19 PROCEDURE — 99220 PR INITIAL OBSERVATION CARE,LEVEL III: CPT | Performed by: PHYSICIAN ASSISTANT

## 2021-09-19 PROCEDURE — 250N000011 HC RX IP 250 OP 636: Performed by: NURSE PRACTITIONER

## 2021-09-19 PROCEDURE — 99222 1ST HOSP IP/OBS MODERATE 55: CPT | Mod: GC | Performed by: INTERNAL MEDICINE

## 2021-09-19 PROCEDURE — G0378 HOSPITAL OBSERVATION PER HR: HCPCS

## 2021-09-19 PROCEDURE — 999N000128 HC STATISTIC PERIPHERAL IV START W/O US GUIDANCE

## 2021-09-19 PROCEDURE — 87086 URINE CULTURE/COLONY COUNT: CPT | Performed by: EMERGENCY MEDICINE

## 2021-09-19 PROCEDURE — 250N000013 HC RX MED GY IP 250 OP 250 PS 637: Performed by: PHYSICIAN ASSISTANT

## 2021-09-19 PROCEDURE — 258N000003 HC RX IP 258 OP 636: Performed by: PHYSICIAN ASSISTANT

## 2021-09-19 PROCEDURE — 250N000013 HC RX MED GY IP 250 OP 250 PS 637: Performed by: NURSE PRACTITIONER

## 2021-09-19 PROCEDURE — C9113 INJ PANTOPRAZOLE SODIUM, VIA: HCPCS | Performed by: PHYSICIAN ASSISTANT

## 2021-09-19 PROCEDURE — 250N000013 HC RX MED GY IP 250 OP 250 PS 637: Performed by: EMERGENCY MEDICINE

## 2021-09-19 RX ORDER — HYDROMORPHONE HYDROCHLORIDE 1 MG/ML
0.3 INJECTION, SOLUTION INTRAMUSCULAR; INTRAVENOUS; SUBCUTANEOUS EVERY 4 HOURS PRN
Status: DISCONTINUED | OUTPATIENT
Start: 2021-09-19 | End: 2021-09-24 | Stop reason: HOSPADM

## 2021-09-19 RX ORDER — CYCLOBENZAPRINE HCL 5 MG
5 TABLET ORAL 3 TIMES DAILY
Status: DISCONTINUED | OUTPATIENT
Start: 2021-09-19 | End: 2021-09-19

## 2021-09-19 RX ORDER — PROCHLORPERAZINE 25 MG
25 SUPPOSITORY, RECTAL RECTAL EVERY 12 HOURS PRN
Status: DISCONTINUED | OUTPATIENT
Start: 2021-09-19 | End: 2021-09-24 | Stop reason: HOSPADM

## 2021-09-19 RX ORDER — ONDANSETRON 4 MG/1
4 TABLET, ORALLY DISINTEGRATING ORAL EVERY 6 HOURS PRN
Status: DISCONTINUED | OUTPATIENT
Start: 2021-09-19 | End: 2021-09-19

## 2021-09-19 RX ORDER — LIDOCAINE 40 MG/G
CREAM TOPICAL 2 TIMES DAILY PRN
Status: DISCONTINUED | OUTPATIENT
Start: 2021-09-19 | End: 2021-09-20

## 2021-09-19 RX ORDER — LEVOTHYROXINE SODIUM 137 UG/1
137 TABLET ORAL
Status: DISCONTINUED | OUTPATIENT
Start: 2021-09-19 | End: 2021-09-24 | Stop reason: HOSPADM

## 2021-09-19 RX ORDER — NALOXONE HYDROCHLORIDE 0.4 MG/ML
0.2 INJECTION, SOLUTION INTRAMUSCULAR; INTRAVENOUS; SUBCUTANEOUS
Status: DISCONTINUED | OUTPATIENT
Start: 2021-09-19 | End: 2021-09-20

## 2021-09-19 RX ORDER — AMITRIPTYLINE HYDROCHLORIDE 50 MG/1
50 TABLET ORAL AT BEDTIME
Status: DISCONTINUED | OUTPATIENT
Start: 2021-09-19 | End: 2021-09-19 | Stop reason: ALTCHOICE

## 2021-09-19 RX ORDER — NALOXONE HYDROCHLORIDE 0.4 MG/ML
0.4 INJECTION, SOLUTION INTRAMUSCULAR; INTRAVENOUS; SUBCUTANEOUS
Status: DISCONTINUED | OUTPATIENT
Start: 2021-09-19 | End: 2021-09-20

## 2021-09-19 RX ORDER — CETIRIZINE HYDROCHLORIDE 10 MG/1
10 TABLET ORAL EVERY MORNING
Status: DISCONTINUED | OUTPATIENT
Start: 2021-09-19 | End: 2021-09-24 | Stop reason: HOSPADM

## 2021-09-19 RX ORDER — ONDANSETRON 2 MG/ML
4 INJECTION INTRAMUSCULAR; INTRAVENOUS EVERY 6 HOURS PRN
Status: DISCONTINUED | OUTPATIENT
Start: 2021-09-19 | End: 2021-09-19

## 2021-09-19 RX ORDER — PROCHLORPERAZINE MALEATE 10 MG
10 TABLET ORAL EVERY 6 HOURS PRN
Status: DISCONTINUED | OUTPATIENT
Start: 2021-09-19 | End: 2021-09-24 | Stop reason: HOSPADM

## 2021-09-19 RX ORDER — HYDROMORPHONE HYDROCHLORIDE 2 MG/1
2-4 TABLET ORAL
Status: DISCONTINUED | OUTPATIENT
Start: 2021-09-19 | End: 2021-09-24 | Stop reason: HOSPADM

## 2021-09-19 RX ORDER — ZOLMITRIPTAN 5 MG/1
5 TABLET, FILM COATED ORAL
Status: DISCONTINUED | OUTPATIENT
Start: 2021-09-19 | End: 2021-09-20

## 2021-09-19 RX ORDER — HYDROMORPHONE HYDROCHLORIDE 2 MG/1
2 TABLET ORAL
Status: DISCONTINUED | OUTPATIENT
Start: 2021-09-19 | End: 2021-09-19

## 2021-09-19 RX ORDER — DIPHENHYDRAMINE HYDROCHLORIDE 50 MG/ML
25 INJECTION INTRAMUSCULAR; INTRAVENOUS EVERY 6 HOURS PRN
Status: DISCONTINUED | OUTPATIENT
Start: 2021-09-19 | End: 2021-09-24 | Stop reason: HOSPADM

## 2021-09-19 RX ORDER — AMITRIPTYLINE HYDROCHLORIDE 10 MG/1
10 TABLET ORAL AT BEDTIME
COMMUNITY
End: 2022-02-17

## 2021-09-19 RX ORDER — AMOXICILLIN 250 MG
1-2 CAPSULE ORAL EVERY MORNING
Status: DISCONTINUED | OUTPATIENT
Start: 2021-09-19 | End: 2021-09-22

## 2021-09-19 RX ORDER — NICOTINE POLACRILEX 4 MG
15-30 LOZENGE BUCCAL
Status: DISCONTINUED | OUTPATIENT
Start: 2021-09-19 | End: 2021-09-24 | Stop reason: HOSPADM

## 2021-09-19 RX ORDER — POLYETHYLENE GLYCOL 3350 17 G/17G
17 POWDER, FOR SOLUTION ORAL EVERY MORNING
Status: DISCONTINUED | OUTPATIENT
Start: 2021-09-19 | End: 2021-09-20

## 2021-09-19 RX ORDER — AMITRIPTYLINE HYDROCHLORIDE 10 MG/1
10 TABLET ORAL AT BEDTIME
Status: DISCONTINUED | OUTPATIENT
Start: 2021-09-19 | End: 2021-09-19 | Stop reason: ALTCHOICE

## 2021-09-19 RX ORDER — SODIUM CHLORIDE 9 MG/ML
INJECTION, SOLUTION INTRAVENOUS CONTINUOUS
Status: DISCONTINUED | OUTPATIENT
Start: 2021-09-19 | End: 2021-09-19

## 2021-09-19 RX ORDER — DEXTROSE MONOHYDRATE 25 G/50ML
25-50 INJECTION, SOLUTION INTRAVENOUS
Status: DISCONTINUED | OUTPATIENT
Start: 2021-09-19 | End: 2021-09-24 | Stop reason: HOSPADM

## 2021-09-19 RX ORDER — HYDROMORPHONE HYDROCHLORIDE 1 MG/ML
0.5 INJECTION, SOLUTION INTRAMUSCULAR; INTRAVENOUS; SUBCUTANEOUS
Status: DISCONTINUED | OUTPATIENT
Start: 2021-09-19 | End: 2021-09-19

## 2021-09-19 RX ORDER — GUAIFENESIN 600 MG/1
15 TABLET, EXTENDED RELEASE ORAL DAILY
Status: DISCONTINUED | OUTPATIENT
Start: 2021-09-19 | End: 2021-09-24 | Stop reason: HOSPADM

## 2021-09-19 RX ORDER — DIPHENHYDRAMINE HCL 25 MG
25 CAPSULE ORAL EVERY 6 HOURS PRN
Status: DISCONTINUED | OUTPATIENT
Start: 2021-09-19 | End: 2021-09-24 | Stop reason: HOSPADM

## 2021-09-19 RX ORDER — CYCLOBENZAPRINE HCL 5 MG
10 TABLET ORAL 3 TIMES DAILY
Status: DISCONTINUED | OUTPATIENT
Start: 2021-09-19 | End: 2021-09-24 | Stop reason: HOSPADM

## 2021-09-19 RX ORDER — DEXTROSE MONOHYDRATE 100 MG/ML
INJECTION, SOLUTION INTRAVENOUS CONTINUOUS PRN
Status: DISCONTINUED | OUTPATIENT
Start: 2021-09-19 | End: 2021-09-21

## 2021-09-19 RX ADMIN — AMITRIPTYLINE HYDROCHLORIDE 60 MG: 10 TABLET, FILM COATED ORAL at 03:27

## 2021-09-19 RX ADMIN — PANTOPRAZOLE SODIUM 40 MG: 40 INJECTION, POWDER, FOR SOLUTION INTRAVENOUS at 19:07

## 2021-09-19 RX ADMIN — PANTOPRAZOLE SODIUM 40 MG: 40 INJECTION, POWDER, FOR SOLUTION INTRAVENOUS at 10:15

## 2021-09-19 RX ADMIN — Medication 15 ML: at 17:52

## 2021-09-19 RX ADMIN — SODIUM CHLORIDE 500 ML: 0.9 INJECTION, SOLUTION INTRAVENOUS at 10:45

## 2021-09-19 RX ADMIN — ZOLMITRIPTAN 5 MG: 5 TABLET, FILM COATED ORAL at 19:08

## 2021-09-19 RX ADMIN — DIPHENHYDRAMINE HYDROCHLORIDE 25 MG: 50 INJECTION, SOLUTION INTRAMUSCULAR; INTRAVENOUS at 16:37

## 2021-09-19 RX ADMIN — PROMETHAZINE HYDROCHLORIDE 12.5 MG: 25 INJECTION INTRAMUSCULAR; INTRAVENOUS at 03:27

## 2021-09-19 RX ADMIN — CYCLOBENZAPRINE HYDROCHLORIDE 10 MG: 5 TABLET, FILM COATED ORAL at 19:06

## 2021-09-19 RX ADMIN — HYDROMORPHONE HYDROCHLORIDE 0.5 MG: 1 INJECTION, SOLUTION INTRAMUSCULAR; INTRAVENOUS; SUBCUTANEOUS at 03:31

## 2021-09-19 RX ADMIN — HYDROMORPHONE HYDROCHLORIDE 0.3 MG: 1 INJECTION, SOLUTION INTRAMUSCULAR; INTRAVENOUS; SUBCUTANEOUS at 09:53

## 2021-09-19 RX ADMIN — LEVOTHYROXINE SODIUM 137 MCG: 0.14 TABLET ORAL at 10:15

## 2021-09-19 RX ADMIN — DIPHENHYDRAMINE HYDROCHLORIDE 25 MG: 50 INJECTION, SOLUTION INTRAMUSCULAR; INTRAVENOUS at 10:07

## 2021-09-19 RX ADMIN — AMITRIPTYLINE HYDROCHLORIDE 60 MG: 10 TABLET, FILM COATED ORAL at 21:52

## 2021-09-19 RX ADMIN — POLYETHYLENE GLYCOL 3350 17 G: 17 POWDER, FOR SOLUTION ORAL at 09:55

## 2021-09-19 RX ADMIN — FAMOTIDINE 20 MG: 20 INJECTION, SOLUTION INTRAVENOUS at 09:56

## 2021-09-19 RX ADMIN — DOCUSATE SODIUM 50 MG AND SENNOSIDES 8.6 MG 1 TABLET: 8.6; 5 TABLET, FILM COATED ORAL at 09:56

## 2021-09-19 RX ADMIN — HYDROMORPHONE HYDROCHLORIDE 2 MG: 2 TABLET ORAL at 13:30

## 2021-09-19 RX ADMIN — CETIRIZINE HYDROCHLORIDE 10 MG: 10 TABLET, FILM COATED ORAL at 09:55

## 2021-09-19 RX ADMIN — PANTOPRAZOLE SODIUM 40 MG: 40 INJECTION, POWDER, FOR SOLUTION INTRAVENOUS at 02:26

## 2021-09-19 RX ADMIN — PROCHLORPERAZINE EDISYLATE 10 MG: 5 INJECTION INTRAMUSCULAR; INTRAVENOUS at 10:15

## 2021-09-19 RX ADMIN — HYDROMORPHONE HYDROCHLORIDE 4 MG: 2 TABLET ORAL at 22:04

## 2021-09-19 RX ADMIN — FAMOTIDINE 20 MG: 20 INJECTION, SOLUTION INTRAVENOUS at 19:07

## 2021-09-19 RX ADMIN — SODIUM CHLORIDE: 9 INJECTION, SOLUTION INTRAVENOUS at 02:24

## 2021-09-19 RX ADMIN — CYCLOBENZAPRINE HYDROCHLORIDE 5 MG: 5 TABLET, FILM COATED ORAL at 12:12

## 2021-09-19 RX ADMIN — PROCHLORPERAZINE EDISYLATE 10 MG: 5 INJECTION INTRAMUSCULAR; INTRAVENOUS at 16:38

## 2021-09-19 NOTE — PROGRESS NOTES
"\"Dinora Mcghee is a 56 year old female admitted on 9/18/2021. She has a PMH notable for hypothyroidism, GERD, allergic rhinitis, migraines, hiatal hernia, lumbar DDD, pituitary adenoma s/p resection, IgG4 pseudotumor of liver, chronic pancreatitis s/p TPAIT, gastroparesis, FTT, admitted 9/7-9/12/21 for PEG tube replacement by percutaneous GJ tube (9/7/21), complicated by displacement of the GJ tube, ultimately undergoing EGD with GI for replacement of PEG J-tube with T tag gastropexy on 9/10/21, presenting now with worsening pain at the enteric tube sites and some fullness in this area.\"    The patient is awaiting GI consult with Dr. Park who has been her provider for several years.  She tells me other PEG J-tube placements have not caused her pain like this most recent one placed on 9/10/21.  She says this was has caused issues with pain since the beginning.  There is no skin redness or concerning drainage on exam.  She has extreme pain with movement of the tube.  Will await GI input.  She is currently NPO.    "

## 2021-09-19 NOTE — PLAN OF CARE
"-diagnostic tests and consults completed and resulted. No. Waiting for GI consult.  -vital signs normal or at patient baseline. Yes  -tolerating oral intake to maintain hydration. No. Patient is NPO.  -adequate pain control on oral analgesics. No. Patient received iv dilaudid for abdominal pain.  -returns to baseline functional status. No.  -GI consult completed with dispo plan. No  BP 97/68 (BP Location: Right arm)   Pulse 71   Temp 97.6  F (36.4  C) (Oral)   Resp 18   Ht 1.702 m (5' 7\")   Wt 59 kg (130 lb)   SpO2 97%   BMI 20.36 kg/m      "

## 2021-09-19 NOTE — UTILIZATION REVIEW
[facility] Utilization Review  Admission Status; Secondary Review Determination     Admission Date: 9/18/2021  5:07 PM      Under the authority of the Utilization Management Committee, the utilization review process indicated a secondary review on the above patient.  The review outcome is based on review of the medical records, discussions with staff, and applying clinical experience noted on the date of the review.        (X)      Inpatient Status Appropriate - This patient's medical care is consistent with medical management for inpatient care and reasonable inpatient medical practice.          RATIONALE FOR DETERMINATION   Dinora Mcghee is a 56 year old female with complex past medical history of gastroparesis, GJ tube placement with multiple revisions, who presented with significant abdominal pain and is registered to observation for GJ tube adjustment, pain control and GI consultation.  However, despite GJ tube adjustment, she continues to have significant pain which is attributed to gastroparesis and possible dysmotility.  Plan is to continue IV opioids, GJ tube to suction, n.p.o. and IV hydration with possible intervention if no improvement per GI.  In light of ongoing symptoms, need for frequent IV pain medications, suctioning for the G-tube and failed observation cares with anticipated length of stay more than 2 midnights, criteria for inpatient admission is met.  Recommendation is communicated to the primary team.      The severity of illness, intensity of service provided, expected length of stay and risk for adverse outcome make the care complex, high risk and appropriate for hospital admission.The patient requires hospital based medical care which is anticipated to require a stay of 2 or more midnights;  therefore the patient is appropriately admitted to the hospital as inpatient.         The information on this document is developed by the utilization review team in order for the business  office to ensure compliance.  This only denotes the appropriateness of proper admission status and does not reflect the quality of care rendered.              Sincerely,       Avril Galdamez MD, MS  Physician Advisor  Utilization Review-[place]    Phone: [phone]

## 2021-09-19 NOTE — PROGRESS NOTES
"CLINICAL NUTRITION SERVICES - ASSESSMENT NOTE  Consult received for TF orders.  Patient is currently under observation status in ED and was seen recently by inpatient RD for TF on 9/9/2021.        Nutrition Prescription    RECOMMENDATIONS FOR MDs/PROVIDERS TO ORDER:  Free water flushes: If no oral intake consumed, recommend at least 800 ml daily via FT to maintain hydration.     Malnutrition Status:    Unable to determine due to unable to complete NFPE at this time. Strongly suspect she likely meets criteria for severe malnutrition based on recent RD notes.    Recommendations already ordered by Registered Dietitian (RD):  1. Tube Feeding: Vital 1.5 (or can use Peptamen 1.5)    9/19: Restart TF @ 35 mL/hr and advance by 10 mL q2h as tolerated to goal 75 ml/hr x 14 hours (9pm - 11am) (change relizorb cartridge every 7 hours)  9/20: If pt is willing, increase to goal 90 ml/hr x 12 hours (9pm-9am) - change relizorb cartridge every 6 hours or attach both cartridges at the start of the cycle \"double up\" on the cartridges    Goal regimen of Vital 1.5 @ 90 mL/hr x 12 hrs provides: 1620 kcals (27 kcal/kg), 72 g PRO (1.2 g pro/kg), 825 ml free H20, 201 g CHO, and 6 g fiber daily. Which meets full nutrition needs.    Enteral Access: Jejunostomy     Water flushes: 60 ml free water q4h.     2. Entered patient care order for Relizorb catridges (obtained from 4A or 7A charge RN)    Future/Additional Recommendations:  Monitor ability to increase to goal cycled regimen.     REASON FOR ASSESSMENT  Dinora Mcghee is a/an 56 year old female assessed by the dietitian for Provider Order - Registered Dietitian to Assess and Order TF per Medical Nutrition Therapy Protocol    NUTRITION HISTORY  Recently seen by inpatient RD on 9/9/21. Essentially minimal PO d/t pain it causes. TF are essentially full source nutrition. Per H&P, pt is taking 3 cans Peptamen (calorie level unspecified) @ 75 mL/hr x 16 hrs (9pm-1pm) daily despite having pain " "around PEG/J site. PEG/J recently replaced 9/10/21 after replacement of PEG to PEG/J on 9/7 became displaced. GI consulted.    CURRENT NUTRITION ORDERS  Diet: NPO    LABS  Labs reviewed    MEDICATIONS  Medications reviewed    ANTHROPOMETRICS  Height: 170.2 cm (5' 7\")  Most Recent Weight: 59 kg (130 lb)    IBW: 61.4 kg   BMI: 20.36 kg/m2; Normal BMI  Weight History: ~12% wt loss over last 3 months.  Wt Readings from Last 20 Encounters:   09/18/21 59 kg (130 lb)   09/16/21 59.9 kg (132 lb)   09/12/21 60.2 kg (132 lb 12.8 oz)   08/09/21 63.5 kg (140 lb)   08/05/21 64.4 kg (142 lb)   08/04/21 65 kg (143 lb 4.8 oz)   07/14/21 65.3 kg (144 lb)   06/28/21 65.3 kg (144 lb)   06/04/21 67.1 kg (148 lb)   04/01/21 69.5 kg (153 lb 4.8 oz)   03/26/21 67.1 kg (148 lb)   01/15/21 68 kg (150 lb)   12/18/20 69.9 kg (154 lb)   02/26/20 70.9 kg (156 lb 3.2 oz)   01/31/20 71.6 kg (157 lb 13.6 oz)   09/17/19 70.9 kg (156 lb 3.2 oz)   09/13/19 71.8 kg (158 lb 6.4 oz)   07/02/19 71.2 kg (156 lb 14.4 oz)   04/05/19 70.8 kg (156 lb)   02/19/19 69.9 kg (154 lb)   Dosing Weight: 59 kg (actual, based on admit wt of 59 kg on 9/18)    ASSESSED NUTRITION NEEDS  Estimated Energy Needs: 9262-1087 kcals/day (25 - 30 kcals/kg)  Justification: Maintenance  Estimated Protein Needs: 71-89 grams protein/day (1.2 - 1.5 grams of pro/kg)  Justification: Hypercatabolism with acute illness  Estimated Fluid Needs: 1 mL/kcal   Justification: Maintenance or Per provider pending fluid status    PHYSICAL FINDINGS  See malnutrition section below.  -PEG/J recently replaced 9/10    MALNUTRITION  % Intake: Decreased intake does not meet criteria  % Weight Loss: > 7.5% in 3 months (severe)  Subcutaneous Fat Loss: Unable to assess - writer not onsite  Muscle Loss: None observed - writer not onsite  Fluid Accumulation/Edema: None noted  Malnutrition Diagnosis: Unable to determine due to unable to complete NFPE at this time. Strongly suspect she likely meets criteria for " severe malnutrition based on recent RD notes.    NUTRITION DIAGNOSIS  Inadequate oral intake related to chronic/significant pain with eating, bloating, hx altered GI function as evidenced by pt report, hx TPAIT, significant weight loss of ~12% over the past 3 months, recent PEG/J placement and need for EN support with PERT to meet full nutrition needs.    INTERVENTIONS  Implementation  -Nutrition Education: Not appropriate at this time due to patient condition (ED status, pain is not under control per chart review).    -Enteral Nutrition - Initiate  -PERT (Relizorb)  -Feeding tube flush  -Multivitamin/mineral supplement therapy     Goals  Total avg nutritional intake to meet a minimum of 25 kcal/kg and 1.2 g PRO/kg daily (per dosing wt 59 kg).     Monitoring/Evaluation  Progress toward goals will be monitored and evaluated per protocol.    Dian Nunez RD, LD  Weekend Pager: [phone]

## 2021-09-19 NOTE — PLAN OF CARE
-diagnostic tests and consults completed and resulted. No. Waiting for GI consult.  -vital signs normal or at patient baseline. Yes  -tolerating oral intake to maintain hydration. No. Patient is NPO.  -adequate pain control on oral analgesics. No. Patient received iv dilaudid for abdominal pain.  -returns to baseline functional status. No.  -GI consult completed with dispo plan. No

## 2021-09-19 NOTE — DISCHARGE SUMMARY
Patient ID:  Dinora Mcghee  MRN: [id number]  56 year old  YOB: 1965    Observation Admit Date: 9/18/2021    ED Admitting Attending: ANA Ortez    Transfer Date and Time: September 19, 2021 at 4:45 PM     Transferring Observation Provider: Altagracia Costa, CANDIDO, APRN CNP    Admission Diagnoses:     1. Abdominal pain, generalized        Transfer Diagnoses:    ##PEGJ Tube Pain  ##S/p Percutaneous GJ tube placement (9/7/21)  ##S/p Replacement of PEG J-tube with T tag gastropexy (9/10/21)  ##Gastroparesis  ##Chronic pancreatitis s/p TPAIT    Emergency Department and Observation Course: Dinora Mcghee is a 56 year old female with a PMH notable for hypothyroidism, GERD, allergic rhinitis, migraines, hiatal hernia, lumbar DDD, pituitary adenoma s/p resection, IgG4 pseudotumor of liver, chronic pancreatitis s/p TPAIT, gastroparesis, FTT, admitted 9/7-9/12/21 for PEG tube replacement by percutaneous GJ tube (9/7/21), complicated by displacement of the GJ tube, ultimately undergoing EGD with GI for replacement of PEG J-tube with T tag gastropexy on 9/10/21, presenting now with worsening pain at the enteric tube sites and some fullness in this area.     ##PEGJ Tube Pain:   ##S/p Percutaneous GJ tube placement (9/7/21):  ##S/p Replacement of PEG J-tube with T tag gastropexy (9/10/21):  ##Gastroparesis:  ##Chronic pancreatitis s/p TPAIT:   Patient presented to the ED with pain at the enteric tube site, worse with movement. PTA she was doing 3 cans of Peptamen from 9pm to 1pm currently at 75ml/hr with a goal of 90 ml/hr. She is able to tolerate oral intake. However, due to pain she PEG was placed. In the ED labs showed CMP with ALT 77 otherwise normal.  Normal lactic acid, lipase, CBC.  COVID-19 PCR on 9/16/2021 was negative. CT abdomen pelvis with contrast reports postsurgical changes consistent with distal pancreatectomy, splenectomy, cholecystectomy; no obstruction colitis diverticulitis or  appendicitis; GJ tube in situ. In the ED the patient was rgiven Dilaudid IV 0.2 mg x 2, Dilaudid IV 0.5 mg x 2, Compazine 5 mg IV x1,  Phenergan 12.5 mg IV x1. Patient was evaluated by GI in ED. This am she notes ongoing pain. Dilaudid does help.   - GI consulted and Dr. Park did speak with the patient today .    - CLD  - Restart TF  - Stop MIVF with NS 125ml/hr  - Protonix 40mg IV BID  - Pepcid 20mg IV BID per home regimen  - BG checks q4h  - Compazine or Phenergan prn nausea  - Dilaudid 0.3mg IV q3h prn pain, PO Dilaudid 2-4 mg's PO PRN pain   - Pain consult per GI recommendations   - G tube to low intermittent suction, in-patient admission, and pain consult.   - increase Flexeril from 5 to 10 mg's TID  - lidocaine cream to site  - Pain consult, discussed with pain team over the phone      ##Hx of IgG4 pseudotumor: Mild LFT abnormality with mildly elevated IgG4. Liver lesion previously biopsied with noticeable proliferation and acute and chronic inflammation and fibrosis of liver with focally increased IgG4-positive plasma cells consistent with IgG4 pseudotumor. Followed by hepatology.   - Outpatient follow up with GI      ##Chronic constipation:   - Resume PTA Motegrity 2 mg daily (will bring from home if admitted > 1 day)  - Continue PTA Miralax daily,  Senna-colace 1-2 tabs BID,      ##GERD:   - Resume famotidine po 20 mg BID, omeprazole po 40 mg BID at discharge     ##RAD:  ##Allergic rhinitis:   - Continue Cetrizine 10 mg daily  - Continue Benadryl 25 mg Q6H PRN  - Resume PTA montelukast 10 mg daily at discharge     ##Hypothyroidism:   - Continue PTA Levothyroxine 137 mcg daily      ##Hx of migraine HA:   - Continue PTA Amitriptyline 60 mg at bedtime  - Continue Zolmitriptan 5 mg as needed at onset of headache      At this time the patient has failed observation management due to PEG site pain requiring IV Dilaudid, G tube to suction, and ongoing hospitalization and will be transferred to inpatient  "status.    Consults: GI    DATA:    Transfer Exam:    /64 (BP Location: Right arm)   Pulse 76   Temp 98.1  F (36.7  C) (Oral)   Resp 18   Ht 1.702 m (5' 7\")   Wt 59 kg (130 lb)   SpO2 97%   BMI 20.36 kg/m    GENERAL: Alert and oriented x 3. NAD.   HEENT: Anicteric sclera. Mucous membranes moist.   CV: RRR. S1, S2. No murmurs appreciated.   RESPIRATORY: Effort normal. Lungs CTAB with no wheezing, rales, rhonchi.   GI: PEG in place, TTP to site. O/w abdomen ios soft and non distended with normoactive bowel sounds present in all quadrants  NEUROLOGICAL: No focal deficits. Moves all extremities.    EXTREMITIES: No peripheral edema. Intact bilateral pedal pulses.   SKIN: No jaundice. No rashes.     Current Medications:    No current outpatient medications on file.       Medications Prior to Admission:    Medications Prior to Admission   Medication Sig Dispense Refill Last Dose     amitriptyline (ELAVIL) 10 MG tablet Take 10 mg by mouth At Bedtime Take with a 50mg tab for a total of 60mg   9/18/2021 at PM     amitriptyline (ELAVIL) 50 MG tablet Take 1 tablet (50 mg) by mouth At Bedtime 90 tablet 3 9/18/2021 at PM     azelastine (ASTELIN) 0.1 % nasal spray Spray 1 spray into both nostrils 2 times daily 1 Bottle 11 9/19/2021 at AM     cetirizine (ZYRTEC) 10 MG tablet Take 1 tablet (10 mg) by mouth daily (Patient taking differently: Take 10 mg by mouth every morning ) 90 tablet 3 9/19/2021 at AM     cyclobenzaprine (FLEXERIL) 5 MG tablet Take 1 tablet (5 mg) by mouth 3 times daily 12 tablet 0 9/19/2021     diphenhydrAMINE (BENADRYL ALLERGY) 25 MG capsule Take 1 capsule (25 mg) by mouth every 6 hours as needed for itching or allergies 56 capsule 0 9/19/2021     estradiol (VAGIFEM) 10 MCG TABS vaginal tablet Place 1 tablet (10 mcg) vaginally twice a week 24 tablet 3 Past Week     famotidine (PEPCID) 20 MG tablet Take 1 tablet (20 mg) by mouth 2 times daily (Patient taking differently: Take 20 mg by mouth every " morning ) 180 tablet 3 9/19/2021     ibuprofen (ADVIL/MOTRIN) 400 MG tablet Take 1 tablet (400 mg) by mouth every 6 hours as needed for moderate pain 30 tablet 0 9/18/2021     levothyroxine (SYNTHROID) 137 MCG tablet Take 1 tablet (137 mcg) by mouth daily (Patient taking differently: Take 137 mcg by mouth every morning ) 90 tablet 3 9/19/2021 at AM     montelukast (SINGULAIR) 10 MG tablet TAKE 1 TABLET(10 MG) BY MOUTH AT BEDTIME 90 tablet 3 9/19/2021 at AM     multivitamin CF formula (CHOICEFUL) capsule Take 1 tablet by mouth daily (Patient taking differently: Take 1 tablet by mouth every morning )  11 9/18/2021 at AM     omeprazole (PRILOSEC) 40 MG DR capsule Take 1 capsule (40 mg) by mouth 2 times daily (Patient taking differently: Take 40 mg by mouth every evening ) 180 capsule 3 9/18/2021 at PM     polyethylene glycol (MIRALAX) 17 GM/Dose powder Take 17 g (1 capful) by mouth daily (Patient taking differently: Take 1 capful by mouth every morning ) 507 g 11 9/19/2021 at AM     prochlorperazine (COMPAZINE) 5 MG tablet Take 1 tablet (5 mg) by mouth every 6 hours as needed Take two 5 mg tablets by mouth every six hours as needed for nausea. 60 tablet 1 9/19/2021 at AM     promethazine (PHENERGAN) 25 MG tablet Take 1 tablet (25 mg) by mouth daily as needed 60 tablet 1 9/19/2021 at AM     Prucalopride Succinate (MOTEGRITY) 2 MG TABS Take 2 mg by mouth daily (Patient taking differently: Take 2 mg by mouth every morning ) 30 tablet 11 9/18/2021     scopolamine (TRANSDERM) 1 MG/3DAYS 72 hr patch Place 1 patch onto the skin every 72 hours 10 patch 11 9/17/2021     senna-docusate (SENOKOT-S;PERICOLACE) 8.6-50 MG per tablet Take 1-2 tablets by mouth 2 times daily (Patient taking differently: Take 1-2 tablets by mouth every morning ) 100 tablet 0 9/19/2021 at AM     ZOLMitriptan (ZOMIG) 5 MG tablet Take 1 tablet (5 mg) by mouth at onset of headache for migraine 9 tablet 11 9/14/2021     blood glucose (PAT CONTOUR NEXT)  test strip Use to test blood sugar 6 times daily or as directed. 2 Box 11      blood glucose monitoring (PAT MICROLET) lancets Use to test blood sugar 6-8 times daily or as directed. 100 each 11      Continuous Blood Gluc Sensor (FREESTYLE LISA 2 SENSOR) MISC 1 each every 14 days 2 each 11      glucose 40 % GEL gel Take 15-30 g by mouth every 15 minutes as needed for low blood sugar 3 Tube 2      order for DME Equipment being ordered:Cefaly 1 Device 0      Sharps Container (BD SHARPS ) MISC 1 Container as needed 1 each 1        Significant Diagnostic Studies:     Results for orders placed or performed during the hospital encounter of 09/18/21   CT Abdomen Pelvis w Contrast     Status: None    Narrative    EXAM: CT ABDOMEN PELVIS W CONTRAST  LOCATION: [facility]  DATE/TIME: 9/18/2021 8:56 PM    INDICATION: Abdominal pain, history of pancreatectomy and auto transplantation, anastomotic strictures.  COMPARISON: CT enterography performed 07/20/2021.  TECHNIQUE: CT scan of the abdomen and pelvis was performed following injection of IV contrast. Multiplanar reformats were obtained. Dose reduction techniques were used.  CONTRAST: Iopamidol (Isovue-370) solution 80 mL.       FINDINGS:   LOWER CHEST: Trace bilateral pleural effusions.    HEPATOBILIARY: Status post cholecystectomy. Trace pneumobilia.    PANCREAS: Status post distal pancreatectomy.    SPLEEN: Status post splenectomy.    ADRENAL GLANDS: Normal.    KIDNEYS/BLADDER: Normal.    BOWEL: GJ tube in situ. No obstruction, colitis, diverticulitis, or appendicitis. No free air or fluid.    LYMPH NODES: Normal.    VASCULATURE: No aneurysm. Retroaortic left renal vein. Widely patent arterial vasculature. Widely patent portal vasculature. Widely patent hepatic veins.    PELVIC ORGANS: Absent uterus.    MUSCULOSKELETAL: Salt and pepper lesion in the L2 vertebral body, not significantly changed consistent with a  vertebral hemangioma.      Impression    IMPRESSION:   1.  Postsurgical changes consistent with distal pancreatectomy, splenectomy, and cholecystectomy.  2.  No obstruction, colitis, diverticulitis, or appendicitis.  3.  Mild pneumobilia.  4.  GJ tube in situ.   Comprehensive metabolic panel     Status: Abnormal   Result Value Ref Range    Sodium 137 133 - 144 mmol/L    Potassium 3.9 3.4 - 5.3 mmol/L    Chloride 103 94 - 109 mmol/L    Carbon Dioxide (CO2) 30 20 - 32 mmol/L    Anion Gap 4 3 - 14 mmol/L    Urea Nitrogen 13 7 - 30 mg/dL    Creatinine 0.73 0.52 - 1.04 mg/dL    Calcium 9.2 8.5 - 10.1 mg/dL    Glucose 95 70 - 99 mg/dL    Alkaline Phosphatase 133 40 - 150 U/L    AST 29 0 - 45 U/L    ALT 77 (H) 0 - 50 U/L    Protein Total 7.7 6.8 - 8.8 g/dL    Albumin 3.8 3.4 - 5.0 g/dL    Bilirubin Total 0.3 0.2 - 1.3 mg/dL    GFR Estimate >90 >60 mL/min/1.73m2   Extra Blue Top Tube     Status: None   Result Value Ref Range    Hold Specimen JIC    Extra Red Top Tube     Status: None   Result Value Ref Range    Hold Specimen JIC    Extra Green Top (Lithium Heparin) Tube     Status: None   Result Value Ref Range    Hold Specimen JIC    Extra Purple Top Tube     Status: None   Result Value Ref Range    Hold Specimen JIC    CBC with platelets and differential     Status: None   Result Value Ref Range    WBC Count 5.4 4.0 - 11.0 10e3/uL    RBC Count 4.29 3.80 - 5.20 10e6/uL    Hemoglobin 13.6 11.7 - 15.7 g/dL    Hematocrit 39.8 35.0 - 47.0 %    MCV 93 78 - 100 fL    MCH 31.7 26.5 - 33.0 pg    MCHC 34.2 31.5 - 36.5 g/dL    RDW 13.3 10.0 - 15.0 %    Platelet Count 426 150 - 450 10e3/uL    % Neutrophils 36 %    % Lymphocytes 34 %    % Monocytes 22 %    % Eosinophils 7 %    % Basophils 1 %    % Immature Granulocytes 0 %    NRBCs per 100 WBC 0 <1 /100    Absolute Neutrophils 2.0 1.6 - 8.3 10e3/uL    Absolute Lymphocytes 1.9 0.8 - 5.3 10e3/uL    Absolute Monocytes 1.2 0.0 - 1.3 10e3/uL    Absolute Eosinophils 0.4 0.0 - 0.7 10e3/uL     Absolute Basophils 0.1 0.0 - 0.2 10e3/uL    Absolute Immature Granulocytes 0.0 <=0.0 10e3/uL    Absolute NRBCs 0.0 10e3/uL   Lipase     Status: Abnormal   Result Value Ref Range    Lipase 12 (L) 73 - 393 U/L   RBC and Platelet Morphology     Status: Abnormal   Result Value Ref Range    Platelet Assessment  Automated Count Confirmed. Platelet morphology is normal.     Automated Count Confirmed. Platelet morphology is normal.    Acanthocytes Slight (A) None Seen    [place] Cells Slight (A) None Seen    Target Cells Slight (A) None Seen    RBC Morphology Confirmed RBC Indices    Lactic acid whole blood     Status: Normal   Result Value Ref Range    Lactic Acid 0.7 0.7 - 2.0 mmol/L   Hemoglobin A1c     Status: Normal   Result Value Ref Range    Hemoglobin A1C 5.5 0.0 - 5.6 %   Glucose by meter     Status: Abnormal   Result Value Ref Range    GLUCOSE BY METER POCT 162 (H) 70 - 99 mg/dL   Glucose by meter     Status: Abnormal   Result Value Ref Range    GLUCOSE BY METER POCT 106 (H) 70 - 99 mg/dL   Glucose by meter     Status: Normal   Result Value Ref Range    GLUCOSE BY METER POCT 75 70 - 99 mg/dL   Glucose by meter     Status: Normal   Result Value Ref Range    GLUCOSE BY METER POCT 78 70 - 99 mg/dL   Glucose by meter     Status: Normal   Result Value Ref Range    GLUCOSE BY METER POCT 89 70 - 99 mg/dL   [place] Draw     Status: None    Narrative    The following orders were created for panel order [place] Draw.  Procedure                               Abnormality         Status                     ---------                               -----------         ------                     Extra Blue Top Tube[[id number]]                              Final result               Extra Red Top Tube[[id number]]                               Final result               Extra Green Top (Lithium...[[id number]]                      Final result               Extra Purple Top Tube[[id number]]                            Final result                  Please view results for these tests on the individual orders.   CBC with platelets differential     Status: Abnormal    Narrative    The following orders were created for panel order CBC with platelets differential.  Procedure                               Abnormality         Status                     ---------                               -----------         ------                     CBC with platelets and d...[[id number]]                      Final result               RBC and Platelet Morphology[[id number]]  Abnormal            Final result                 Please view results for these tests on the individual orders.       Signed:  Altagracia Costa NP, APRN CNP  September 19, 2021 at 4:45 PM

## 2021-09-19 NOTE — PROGRESS NOTES
Pt very well known to me and our service. Post TPIAT approximately 5 years ago, failure to thrive and malnutrition w recent onset weight loss, vomiting food intolerance. Diagnosed w gastroparesis. On 9/7 we placed PEGJ w sutured gastropexy, had predicted post procedure pain at G tube site, but due to J tube feeding intolerance Xray done, revealed GJ coiled back into stomach. 9/10 GJ replaced using pediatric scope through fresh G tube track, reinforced w single T fastener, long 57cm wieghted GJ placed. Pt recovered, tolerated tube feeds, went home. Initially had some local pain but did fairly well. Routine follow-up w NP Thursday 9/16 as OP showed xray w J tube deep into jejunum. T fastener removed, w difficulty as it was tightly impacted. Since then, at home, pain steadily worse, became intolerable, especially w spasms during which bumper pulled into abdominal wall. Last night in ED, patient in extreme pain intermittently, pain constantly w burning tenderness. Despite that, no fever, WBC elevation or tachycardia or cellulitis at PEG site. Tube bumper pulled back about 5mm by GI fellow. CT at our request showed tube in appropriate position in antrum (R side), no abcess, but to my review thickened gastric and abdominal wall cw inflammation. ED doc pulled tube bumper back another cm or so to 6cm [place]. Currently NPO, but no J tube feeds or G tube to suction. Puffy, volume overloaded    Despite that, pt admitted w ongoing pain requiring IV analgesia. Fairly detailed discussion between myself, pt and  about likely events, and plans as follows.     IMP  1) Pt had same intolerance of previous GJ pre-TPIAT, and post TPIAT, therefore may be a visceral intolerance BUT clearly has acute/semi acute inflammation around PEG tube tract, that may or may not resolve.  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  3) Has gastroparesis so gastric distention probably  playing a role  4) Possible volume overload    REC SHORT TERM:  1) Place G tube to low intermittent suction  2) Restart J tube feedings  3) Decrease IV fluids  4) Pain consult    OPTIONS LONG TERM:  1) Pain control, above interventions, and wait for pain to improve  2) If not improved change GJ to short G tube, loose, to allow tract to reduce inflammation  3) Remove GJ altogether, but would leave pt back to square 1 minus a few steps, worse pain    We discussed the pros and cons of all 3     Will reassess in AM    Mandeep Park MD  Gastroenterology, Pancreas and Biliary Disorders  [facility]

## 2021-09-19 NOTE — PHARMACY-ADMISSION MEDICATION HISTORY
Admission Medication History Completed by Pharmacy    See [facility] Admission Navigator for allergy information, preferred outpatient pharmacy, prior to admission medications and immunization status.     Medication History Sources:     Patient    Sure Scripts    Changes made to PTA medication list (reason):    Added: None    Deleted:   o Folic acid 1 mg tablet - take 1 mg by mouth daily (per patient)    Changed: None    Additional Information:    Patient knew all of their medication names, doses, frequencies, and the last time each was taken.    Patient currently has on a scopolamine patch and it was placed Friday 9/17/2021.    Patient denied the use of any other prescription or over the counter medications.     Prior to Admission medications    Medication Sig Last Dose Taking? Auth Provider   amitriptyline (ELAVIL) 10 MG tablet Take 10 mg by mouth At Bedtime Take with a 50mg tab for a total of 60mg 9/18/2021 at PM Yes Reported, Patient   amitriptyline (ELAVIL) 50 MG tablet Take 1 tablet (50 mg) by mouth At Bedtime 9/18/2021 at PM Yes Latasha Paul APRN CNP   azelastine (ASTELIN) 0.1 % nasal spray Spray 1 spray into both nostrils 2 times daily 9/19/2021 at AM Yes Latasha Paul APRN CNP   cetirizine (ZYRTEC) 10 MG tablet Take 1 tablet (10 mg) by mouth daily  Patient taking differently: Take 10 mg by mouth every morning  9/19/2021 at AM Yes Latasha Paul APRN CNP   cyclobenzaprine (FLEXERIL) 5 MG tablet Take 1 tablet (5 mg) by mouth 3 times daily 9/19/2021 Yes Rasheeda aSenz PA   diphenhydrAMINE (BENADRYL ALLERGY) 25 MG capsule Take 1 capsule (25 mg) by mouth every 6 hours as needed for itching or allergies 9/19/2021 Yes Nestor Phoenix MD   estradiol (VAGIFEM) 10 MCG TABS vaginal tablet Place 1 tablet (10 mcg) vaginally twice a week Past Week Yes Latasha Paul APRN CNP   famotidine (PEPCID) 20 MG tablet Take 1 tablet (20 mg) by mouth 2 times daily  Patient taking differently: Take 20 mg by  mouth every morning  9/19/2021 Yes Rosanne Marti PA-C   ibuprofen (ADVIL/MOTRIN) 400 MG tablet Take 1 tablet (400 mg) by mouth every 6 hours as needed for moderate pain 9/18/2021 Yes Altagracia العلي PA-C   levothyroxine (SYNTHROID) 137 MCG tablet Take 1 tablet (137 mcg) by mouth daily  Patient taking differently: Take 137 mcg by mouth every morning  9/19/2021 at AM Yes Latasha Paul APRN CNP   montelukast (SINGULAIR) 10 MG tablet TAKE 1 TABLET(10 MG) BY MOUTH AT BEDTIME 9/19/2021 at AM Yes Latasha Paul APRN CNP   multivitamin CF formula (CHOICEFUL) capsule Take 1 tablet by mouth daily  Patient taking differently: Take 1 tablet by mouth every morning  9/18/2021 at AM Yes Nestor Phoenix MD   omeprazole (PRILOSEC) 40 MG DR capsule Take 1 capsule (40 mg) by mouth 2 times daily  Patient taking differently: Take 40 mg by mouth every evening  9/18/2021 at PM Yes Rosanne Marti PA-C   polyethylene glycol (MIRALAX) 17 GM/Dose powder Take 17 g (1 capful) by mouth daily  Patient taking differently: Take 1 capful by mouth every morning  9/19/2021 at AM Yes Rosanne Marti PA-C   prochlorperazine (COMPAZINE) 5 MG tablet Take 1 tablet (5 mg) by mouth every 6 hours as needed Take two 5 mg tablets by mouth every six hours as needed for nausea. 9/19/2021 at AM Yes Rosanne Marti PA-C   promethazine (PHENERGAN) 25 MG tablet Take 1 tablet (25 mg) by mouth daily as needed 9/19/2021 at AM Yes Rosanne Marti PA-C   Prucalopride Succinate (MOTEGRITY) 2 MG TABS Take 2 mg by mouth daily  Patient taking differently: Take 2 mg by mouth every morning  9/18/2021 Yes Rosanne Marti PA-C   scopolamine (TRANSDERM) 1 MG/3DAYS 72 hr patch Place 1 patch onto the skin every 72 hours 9/17/2021 Yes Rosanne Marti PA-C   senna-docusate (SENOKOT-S;PERICOLACE) 8.6-50 MG per tablet Take 1-2 tablets by mouth 2 times daily  Patient taking differently: Take 1-2 tablets  by mouth every morning  9/19/2021 at AM Yes Altagracia العلي PA-C   ZOLMitriptan (ZOMIG) 5 MG tablet Take 1 tablet (5 mg) by mouth at onset of headache for migraine 9/14/2021 Yes Latasha Paul APRN CNP   blood glucose (PAT CONTOUR NEXT) test strip Use to test blood sugar 6 times daily or as directed.   Gloria Melissa MD   blood glucose monitoring (PAT MICROLET) lancets Use to test blood sugar 6-8 times daily or as directed.   Gloria Melissa MD   Continuous Blood Gluc Sensor (FREESTYLE LISA 2 SENSOR) MISC 1 each every 14 days   Gloria Melissa MD   glucose 40 % GEL gel Take 15-30 g by mouth every 15 minutes as needed for low blood sugar   Melissa Sood PA-C   order for DME Equipment being ordered:Ruiz Rivas MD   Sharps Container (BD SHARPS ) MISC 1 Container as needed   Melissa Sood PA-C       Date completed: 09/19/21    Medication history completed by: Josue Hewitt

## 2021-09-19 NOTE — H&P
[facility]    History and Physical - ED Observation Service       Date of Admission:  9/18/2021    Assessment & Plan      Dinora Mcghee is a 56 year old female admitted on 9/18/2021. She has a PMH notable for hypothyroidism, GERD, allergic rhinitis, migraines, hiatal hernia, lumbar DDD, pituitary adenoma s/p resection, IgG4 pseudotumor of liver, chronic pancreatitis s/p TPAIT, gastroparesis, FTT, admitted 9/7-9/12/21 for PEG tube replacement by percutaneous GJ tube (9/7/21), complicated by displacement of the GJ tube, ultimately undergoing EGD with GI for replacement of PEG J-tube with T tag gastropexy on 9/10/21, presenting now with worsening pain at the enteric tube sites and some fullness in this area.    ##. Intense Pain at GJT site  ##. S/p Percutaneous GJ tube placement (9/7/21)  ##. S/p Replacement of PEG J-tube with T tag gastropexy (9/10/21)  ##. Gastroparesis  ##. Chronic pancreatitis s/p TPAIT  Pain at the enteric tube site, worse with movement. Dramatic spastic movements of tube in and out of stoma spontaneously that causes intense pain. Reports skin feels raw, some blood in the tube itself. No melena. No other complaints. Does 3 cans of Peptamen from 9pm to 1pm currently at 75ml/hr with a goal of 90 ml/hr. She has no risks eating by mouth however it causes her pain as a result her and her GI doctor have decided she avoids po intake. In ED, HR 80s-90s, BP 110s/80s, RR 16, SaO2 100% on RA, Temp 97.7. Labs show CMP with ALT 77 otherwise normal.  Normal lactic acid, lipase, CBC.  COVID-19 PCR on 9/16/2021 was negative. CT abdomen pelvis with contrast reports postsurgical changes consistent with distal pancreatectomy, splenectomy, cholecystectomy; no obstruction colitis diverticulitis or appendicitis; GJ tube in situ. In the ED the patient was rgiven Dilaudid IV 0.2 mg x 2, Dilaudid IV 0.5 mg x 2, Compazine 5 mg IV x1,  Phenergan 12.5 mg IV x1. Patient was  evaluated by GI in ED with plan for further intervention in AM with Dr. Park as bumper of PEG is close to skin.   - GI consult  - NPO  - MIVF with NS 125ml/hr  - Protonix 40mg IV BID  - Pepcid 20mg IV BID per home regimen  - BG checks q4h  - Compazine or Phenergan prn nausea  - Dilaudid 0.5mg IV q3h prn pain     ##. Hx of IgG4 pseudotumor: Mild LFT abnormality with mildly elevated IgG4. Liver lesion previously biopsied with noticeable proliferation and acute and chronic inflammation and fibrosis of liver with focally increased IgG4-positive plasma cells consistent with IgG4 pseudotumor. Followed by hepatology.   - Outpatient follow up with GI      ##. Chronic constipation: - Resume PTA Motegrity 2 mg daily (will bring from home if admitted > 1 day)  - Continue PTA Miralax daily,  Senna-colace 1-2 tabs BID,      ##. GERD: - Resume famotidine po 20 mg BID, omeprazole po 40 mg BID at discharge     ##. RAD  ##. Allergic rhinitis  - Continue Cetrizine 10 mg daily  - Continue Benadryl 25 mg Q6H PRN  - Resume PTA montelukast 10 mg daily at discharge     ##. Hypothyroidism: - Continue PTA Levothyroxine 137 mcg daily      ##. Hx of migraine HA: - Continue PTA Amitriptyline 60 mg at bedtime  - Continue Zolmitriptan 5 mg as needed at onset of headache         Diet: NPO for Medical/Clinical Reasons Except for: Ice Chips    DVT Prophylaxis: Pneumatic Compression Devices  Piedra Catheter: Not present  Central Lines: None  Code Status: Full Code      Clinically Significant Risk Factors Present on Admission                   Disposition Plan   Expected discharge:  recommended to prior living arrangement once adequate pain management/ tolerating PO medications and GJ tube troubleshooted by GI.     The patient's care was discussed with the Attending Physician, Dr. Richard.    ANA Ortez  [facility]  Securely message with the Vocera Web Console (learn more here)  Text page  "via [facility] Paging/Directory      ______________________________________________________________________    Chief Complaint   G-tube problem    History is obtained from the patient    History of Present Illness   Dinora Mcghee is a 56 year old female with a PMH notable for hypothyroidism, GERD, allergic rhinitis, migraines, hiatal hernia, lumbar DDD, pituitary adenoma s/p resection, IgG4 pseudotumor of liver, chronic pancreatitis s/p TPAIT, gastroparesis, FTT, admitted 9/7-9/12/21 for PEG tube replacement by percutaneous GJ tube (9/7/21), complicated by displacement of the GJ tube, ultimately undergoing EGD with GI for replacement of PEG J-tube with T tag gastropexy on 9/10/21, presenting now with worsening pain at the enteric tube sites and some fullness in this area.     Per ED note: \"Patient has pain at the enteric tube site as mentioned, worse with movement, says the skin feels raw, yesterday had noticed some blood in the tube itself that has since resolved has not had any melena.  Outside of that discomfort she will feel like as though the tube wants to go into her abdomen somewhat and then will pop out which causes severe pain that she rates at 10-20 out of 10 when that type of situation happens.  She reports that her home health team thought perhaps the tube itself was too tight in the abdomen.  It seemed to have been functioning well, but just positionally so forth is very uncomfortable for her.  Outside of the abdominal pain and the discomfort around the enteric tube itself she says that she is doing great.  No change to bowel or bladder.  No nausea or vomiting.  No blood in the stools or urine, no melena.  No lightheadedness, dizziness, syncope or near syncope.  She did have a fever for 1 day on Friday but this was immediately following her Covid vaccination quickly resolved on his own and has not had any recurrent issues.  Again outside of the abdominal discomfort feels quite well without any symptoms or " "concerns.  No other new symptoms or complaints at this time.\"    Patient states she does 3 cans of Peptamen from 9pm to 1pm. She is currently at 75ml/hr with a goal of 90 ml/hr. So far she is tolerating feeds just fine. She has no risks eating by mouth however it causes her pain as a result her and her GI doctor have decided she avoids po intake. Patient has a video of spontaneous dramatic movement of PEG in and out of stoma d/t spasms/peristasis causing her intense pain. Reports BG have been in the 150's. Has a continuous glucose monitor but not on insulin yet.     In the ED, HR 80s-90s, BP 110s/80s, RR 16, SaO2 100% on RA, Temp 97.7. Labs show CMP with ALT 77 otherwise normal.  Normal lactic acid, lipase, CBC.  COVID-19 PCR on 9/16/2021 was negative.  CT abdomen pelvis with contrast reports postsurgical changes consistent with distal pancreatectomy, splenectomy, cholecystectomy; no obstruction colitis diverticulitis or appendicitis; GJ tube in situ. In the ED the patient was rgiven Dilaudid IV 0.2 mg x 2, Dilaudid IV 0.5 mg x 2, Compazine 5 mg IV x1,  Phenergan 12.5 mg IV x1. Patient was evaluated by GI in ED with plan for further intervention in AM with Dr. Park as bumper of PEG is close to skin.     Review of Systems    All other ROS negative except those mentioned in above note.      Past Medical History    I have reviewed this patient's medical history and updated it with pertinent information if needed.   Past Medical History:   Diagnosis Date     Allergic rhinitis, cause unspecified      Allergy to other foods     strawberries, apples, celeries, alice, watermelon     Arthritis     left knee     Choledocholithiasis     long after cholecystectomy     Chronic abdominal pain      Chronic constipation      Chronic nausea      Chronic pancreatitis (H)      Degeneration of lumbar or lumbosacral intervertebral disc      Esophageal reflux     w/ hiatal hernia     Gastroparesis      Hiatal hernia      History of " pituitary adenoma     s/p resection     Hypothyroidism      Migraines      Mild hyperlipidemia      On tube feeding diet     presence of GJ tube     Pancreatic disease     PD stricture, 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  Past Surgical History:   Procedure Laterality Date     ABDOMEN SURGERY      c sections: 93, 96, 98. endometriosis growth     APPENDECTOMY       C  DELIVERY ONLY       C  DELIVERY ONLY      repeat c section with incidental cystotomy with repair     C EXCIS PITUITARY,TRANSNASAL/SEPTAL      pituitary tumor removed for diabetes insipidus     C TOTAL ABDOM HYSTERECTOMY      w/ bilateral salpingoophorectomy       SECTION       COLONOSCOPY       ENDOSCOPIC RETROGRADE CHOLANGIOPANCREATOGRAM N/A 2015    Procedure: ENDOSCOPIC RETROGRADE CHOLANGIOPANCREATOGRAM;  Surgeon: Mandeep Park MD;  Location: [facility]     ENDOSCOPIC RETROGRADE CHOLANGIOPANCREATOGRAM N/A 2016    Procedure: COMBINED ENDOSCOPIC RETROGRADE CHOLANGIOPANCREATOGRAPHY, PLACE TUBE/STENT;  Surgeon: Mandeep Park MD;  Location: [facility]     ENDOSCOPIC RETROGRADE CHOLANGIOPANCREATOGRAM N/A 3/17/2016    Procedure: COMBINED ENDOSCOPIC RETROGRADE CHOLANGIOPANCREATOGRAPHY, REMOVE FOREIGN BODY OR STENT/TUBE;  Surgeon: Mandeep Park MD;  Location: [facility]     ENDOSCOPIC RETROGRADE CHOLANGIOPANCREATOGRAM N/A 2016    Procedure: COMBINED ENDOSCOPIC RETROGRADE CHOLANGIOPANCREATOGRAPHY, PLACE TUBE/STENT;  Surgeon: Mandeep Park MD;  Location: [facility]     ENDOSCOPIC RETROGRADE CHOLANGIOPANCREATOGRAM N/A 2016    Procedure: COMBINED ENDOSCOPIC RETROGRADE CHOLANGIOPANCREATOGRAPHY, REMOVE FOREIGN  BODY OR STENT/TUBE;  Surgeon: Mandeep Park MD;  [facility]: [facility]     ENDOSCOPIC ULTRASOUND UPPER GASTROINTESTINAL TRACT (GI) N/A 10/3/2016    Procedure: ENDOSCOPIC ULTRASOUND, ESOPHAGOSCOPY / UPPER GASTROINTESTINAL TRACT (GI);  Surgeon: Guru Jose Rodas MD;  [facility]: [facility]     ESOPHAGOSCOPY, GASTROSCOPY, DUODENOSCOPY (EGD), COMBINED N/A 6/24/2015    Procedure: COMBINED ESOPHAGOSCOPY, GASTROSCOPY, DUODENOSCOPY (EGD), REMOVE FOREIGN BODY;  Surgeon: Mandeep Park MD;  [facility]: [facility]     ESOPHAGOSCOPY, GASTROSCOPY, DUODENOSCOPY (EGD), COMBINED N/A 10/25/2015    Procedure: COMBINED ESOPHAGOSCOPY, GASTROSCOPY, DUODENOSCOPY (EGD);  Surgeon: Sammy Amaro MD;  [facility]     ESOPHAGOSCOPY, GASTROSCOPY, DUODENOSCOPY (EGD), COMBINED N/A 10/25/2015    Procedure: COMBINED ESOPHAGOSCOPY, GASTROSCOPY, DUODENOSCOPY (EGD), BIOPSY SINGLE OR MULTIPLE;  Surgeon: Sammy Amaro MD;  Location: [facility]     ESOPHAGOSCOPY, GASTROSCOPY, DUODENOSCOPY (EGD), DILATATION, COMBINED       EXCISE LESION TRUNK N/A 4/17/2017    Procedure: EXCISE LESION TRUNK;  Removal of Abdominal Foreign Body;  Surgeon: Nestor Phoenix MD;  [facility]     HC ESOPH/GAS REFLUX TEST W NASAL IMPED >1 HR N/A 11/19/2015    Procedure: ESOPHAGEAL IMPEDENCE FUNCTION TEST WITH 24 HOUR PH GREATER THAN 1 HOUR;  Surgeon: Thiago Apple MD;  Location: [facility]     HC U[facility] ENDOSCOPY DIAG W BIOPSY  9/17/08     HC UGI ENDOSCOPY DIAG W BIOPSY  9/27/12     HC UGI ENDOSCOPY W ESOPHAGEAL DILATION BALLOON <30MM  9/17/08     HC UGI ENDOSCOPY W EUS N/A 5/5/2015    Procedure: COMBINED ENDOSCOPIC ULTRASOUND, ESOPHAGOSCOPY, GASTROSCOPY, DUODENOSCOPY (EGD);  Surgeon: Wm Dueñas MD;  Location: [facility]     HC WRIST ARTHROSCOP,RELEASE XVERS LIG Bilateral 12/17/08     INJECT TRANSVERSUS ABDOMINIS PLANE (TAP) BLOCK BILATERAL Left 9/22/2016    Procedure: INJECT TRANSVERSUS ABDOMINIS PLANE (TAP) BLOCK BILATERAL;  Surgeon:  Dickson Corrigan MD;  Location: [facility]     laparoscopic pineda       LAPAROSCOPIC HERNIORRHAPHY INCISIONAL N/A 2018    Procedure: LAPAROSCOPIC HERNIORRHAPHY INCISIONAL;  Laparoscopic Incisional Hernia Repair with Symbotex Mesh Implant;  Surgeon: Nestor Phoenix MD;  Location: [facility]     LAPAROSCOPIC PANCREATECTOMY, TRANSPLANT AUTO ISLET CELL N/A 2016    Procedure: LAPAROSCOPIC PANCREATECTOMY, TRANSPLANT AUTO ISLET CELL;  Surgeon: Nestor Phoenix MD;  [facility]: [facility]     REPLACE GASTROJEJUNOSTOMY TUBE, PERCUTANEOUS N/A 2021    Procedure: GASTROJEJUNOSTOMY TUBE PLACEMENT, PERCUTANEOUS, WITH GASTROPEXY;  Surgeon: Mandeep Park MD;  [facility]     REPLACE JEJUNOSTOMY TUBE, PERCUTANEOUS N/A 9/10/2021    Procedure: UPPER ENDOSCOPY, REPLACEMENT OF PERCUTANEOUS GASTROJEJUNOSTOMY TUBE, T-TAG GASTROPEXY;  Surgeon: Zackery Montoya MD;  Location: [facility]     transphenoidal pituitary resection         Social History   I have reviewed this patient's social history and updated it with pertinent information if needed.  Social History     Tobacco Use     Smoking status: Former Smoker     Packs/day: 0.50     Years: 6.00     Pack years: 3.00     Types: Cigarettes     Start date: 1985     Quit date: 1992     Years since quittin.7     Smokeless tobacco: Never Used     Tobacco comment: no 2nd hand   Substance Use Topics     Alcohol use: Not Currently     Alcohol/week: 3.0 - 6.0 standard drinks     Types: 1 - 2 Glasses of wine, 1 - 2 Cans of beer, 1 - 2 Shots of liquor per week     Comment: none since IGG     Drug use: No       Family History   I have reviewed this patient's family history and updated it with pertinent information if needed.  Family History   Problem Relation Age of Onset     Eye Disorder Father         cataract, detached retina     Myocardial Infarction Father 60     Lipids Father      Cerebrovascular Disease Father      Depression Father      Substance Abuse Father       Anesthesia Reaction Father         stroke right after surgery     Cataracts Father      Osteoarthritis Father      Ulcerative Colitis Father      Lipids Mother      Hypertension Mother      Thyroid Disease Mother      Depression Mother      Angina Mother      GERD Mother      Skin Cancer Mother      Eye Disorder Son         ptosis     Eye Disorder Paternal Grandmother         cataract     Eye Disorder Paternal Grandfather         cataract     Diabetes Paternal Grandfather      Eye Disorder Maternal Grandmother         cataract     Thyroid Disease Maternal Grandmother      Coronary Artery Disease Sister         angioplasty     GERD Sister      Substance Abuse Sister      Depression Sister      Depression Son      Anxiety Disorder Son      Thyroid Disease Sister      Diabetes Maternal Grandfather      Depression Nephew      Anxiety Disorder Nephew      Thyroid Disease Nephew      Diabetes Type 2  Cousin         paternal cousin     Heart Disease Paternal Aunt      Diabetes Paternal Aunt      Diabetes Paternal Uncle      Heart Disease Paternal Uncle        Prior to Admission Medications   Prior to Admission Medications   Prescriptions Last Dose Informant Patient Reported? Taking?   Continuous Blood Gluc Sensor (FREESTYLE LISA 2 SENSOR) MISC   No No   Si each every 14 days   FOLIC ACID PO   Yes No   Sig: Take 1 mg by mouth every morning    Prucalopride Succinate (MOTEGRITY) 2 MG TABS   No No   Sig: Take 2 mg by mouth daily   Patient taking differently: Take 2 mg by mouth every morning    Sharps Container (BD SHARPS ) MISC   No No   Si Container as needed   ZOLMitriptan (ZOMIG) 5 MG tablet   No No   Sig: Take 1 tablet (5 mg) by mouth at onset of headache for migraine   amitriptyline (ELAVIL) 10 MG tablet   Yes Yes   Sig: Take 10 mg by mouth At Bedtime Take with a 50mg tab for a total of 60mg   amitriptyline (ELAVIL) 50 MG tablet   No No   Sig: Take 1 tablet (50 mg) by mouth At Bedtime   azelastine  (ASTELIN) 0.1 % nasal spray   No No   Sig: Spray 1 spray into both nostrils 2 times daily   blood glucose (PAT CONTOUR NEXT) test strip   No No   Sig: Use to test blood sugar 6 times daily or as directed.   blood glucose monitoring (PAT MICROLET) lancets   No No   Sig: Use to test blood sugar 6-8 times daily or as directed.   cetirizine (ZYRTEC) 10 MG tablet   No No   Sig: Take 1 tablet (10 mg) by mouth daily   Patient taking differently: Take 10 mg by mouth every morning    cyclobenzaprine (FLEXERIL) 5 MG tablet   No No   Sig: Take 1 tablet (5 mg) by mouth 3 times daily   diphenhydrAMINE (BENADRYL ALLERGY) 25 MG capsule   No No   Sig: Take 1 capsule (25 mg) by mouth every 6 hours as needed for itching or allergies   estradiol (VAGIFEM) 10 MCG TABS vaginal tablet   No No   Sig: Place 1 tablet (10 mcg) vaginally twice a week   famotidine (PEPCID) 20 MG tablet   No No   Sig: Take 1 tablet (20 mg) by mouth 2 times daily   Patient taking differently: Take 20 mg by mouth every morning    glucose 40 % GEL gel   No No   Sig: Take 15-30 g by mouth every 15 minutes as needed for low blood sugar   ibuprofen (ADVIL/MOTRIN) 400 MG tablet   No No   Sig: Take 1 tablet (400 mg) by mouth every 6 hours as needed for moderate pain   Patient taking differently: Take 400 mg by mouth every 6 hours as needed for moderate pain    levothyroxine (SYNTHROID) 137 MCG tablet   No No   Sig: Take 1 tablet (137 mcg) by mouth daily   Patient taking differently: Take 137 mcg by mouth every morning    montelukast (SINGULAIR) 10 MG tablet   No No   Sig: TAKE 1 TABLET(10 MG) BY MOUTH AT BEDTIME   multivitamin CF formula (CHOICEFUL) capsule   No No   Sig: Take 1 tablet by mouth daily   Patient taking differently: Take 1 tablet by mouth every morning    omeprazole (PRILOSEC) 40 MG DR capsule   No No   Sig: Take 1 capsule (40 mg) by mouth 2 times daily   Patient taking differently: Take 40 mg by mouth every evening    order for DME   No No   Sig:  Equipment being ordered:Cefaly   polyethylene glycol (MIRALAX) 17 GM/Dose powder   No No   Sig: Take 17 g (1 capful) by mouth daily   Patient taking differently: Take 1 capful by mouth every morning    prochlorperazine (COMPAZINE) 5 MG tablet   No No   Sig: Take 1 tablet (5 mg) by mouth every 6 hours as needed Take two 5 mg tablets by mouth every six hours as needed for nausea.   promethazine (PHENERGAN) 25 MG tablet   No No   Sig: Take 1 tablet (25 mg) by mouth daily as needed   scopolamine (TRANSDERM) 1 MG/3DAYS 72 hr patch   No No   Sig: Place 1 patch onto the skin every 72 hours   senna-docusate (SENOKOT-S;PERICOLACE) 8.6-50 MG per tablet   No No   Sig: Take 1-2 tablets by mouth 2 times daily   Patient taking differently: Take 1-2 tablets by mouth every morning       Facility-Administered Medications: None     Allergies   Allergies   Allergen Reactions     Apple Anaphylaxis     Corticosteroids Other (See Comments)     All oral, IV and injectable steroids are contraindicated (unless in life threatening situations) in Islet Auto transplant recipients. They can cause irreversible loss of islet cell function. Please contact patient's transplant care coordinator ADI Gaffney RN at [phone]/pager [phone] and/or endocrinologist prior to administration.       Depakote [Divalproex Sodium] Other (See Comments)     Chest pain     Zithromax [Azithromycin Dihydrate] Anaphylaxis     Noted in 4/7/08 ER     Darvocet [Propoxyphene N-Apap] Itching     Morphine Nausea and Vomiting and Rash     Nalbuphine Hcl Rash     RASH :nubaine     Zosyn [Piperacillin-Tazobactam In D5w] Rash     Possible allergy, did have a diffuse rash that seemed drug related but could have also been related to soap in the hospital.      Bactrim [Sulfamethoxazole W-Trimethoprim] Other (See Comments) and Nausea and Vomiting     Severely low liver function.     Cats      Compazine [Prochlorperazine] Other (See Comments)     Twitching. Takes Benedryl  and is fine     Dust Mites      Grass      Ragweeds      Tape [Adhesive Tape] Blisters     Tramadol      Trees      Zofran [Ondansetron] Other (See Comments)     migraine     Flagyl [Metronidazole] Hives and Rash       Physical Exam   Vital Signs: Temp: 97.7  F (36.5  C) Temp src: Temporal BP: 110/81 Pulse: 91   Resp: 16 SpO2: 100 % O2 Device: None (Room air)    Weight: 130 lbs 0 oz    Constitutional: awake, alert, cooperative, no apparent distress, and appears stated age  Eyes: Lids and lashes normal, pupils equal, round and reactive to light, extra ocular muscles intact, sclera clear, conjunctiva normal  ENT: Normocephalic, without obvious abnormality, atraumatic, sinuses nontender on palpation, external ears without lesions, oral pharynx with moist mucous membranes, tonsils without erythema or exudates, gums normal and good dentition.  Hematologic / Lymphatic: no cervical lymphadenopathy  Respiratory: No increased work of breathing, good air exchange, clear to auscultation bilaterally, no crackles or wheezing  Cardiovascular: Normal apical impulse, regular rate and rhythm, normal S1 and S2, no S3 or S4, and no murmur noted  GI: No scars, normal bowel sounds, soft, non-distended, non-tender, no masses palpated, no hepatosplenomegally. PEG-J in place snug to skin. Tenderness around tube.   Skin: no bruising or bleeding  Musculoskeletal: There is no redness, warmth, or swelling of the joints.  Full range of motion noted.  Motor strength is 5 out of 5 all extremities bilaterally.  Tone is normal.  Neurologic: Awake, alert, oriented to name, place and time.  Cranial nerves II-XII are grossly intact.  Motor is 5 out of 5 bilaterally.  Cerebellar finger to nose, heel to shin intact.  Sensory is intact.  Babinski down going, Romberg negative, and gait is normal.  Neuropsychiatric: General: normal, calm and normal eye contact      Data   Data reviewed today: I reviewed all medications, new labs and imaging results over  the last 24 hours. I personally reviewed     Recent Labs   Lab 09/19/21 0202 09/18/21 1936 09/18/21 1717 09/16/21  1308 09/12/21  1112   WBC  --   --  5.4 6.5  --    HGB  --   --  13.6 13.0  --    MCV  --   --  93 96  --    PLT  --   --  426 387  --    NA  --   --  137  --   --    POTASSIUM  --   --  3.9  --   --    CHLORIDE  --   --  103  --   --    CO2  --   --  30  --   --    BUN  --   --  13  --   --    CR  --   --  0.73  --   --    ANIONGAP  --   --  4  --   --    KASSI  --   --  9.2  --   --    *  --  95  --  131*   ALBUMIN  --   --  3.8  --   --    PROTTOTAL  --   --  7.7  --   --    BILITOTAL  --   --  0.3  --   --    ALKPHOS  --   --  133  --   --    ALT  --   --  77*  --   --    AST  --   --  29  --   --    LIPASE  --  12*  --   --   --      Most Recent 3 CBC's:Recent Labs   Lab Test 09/18/21 1717 09/16/21  1308 09/08/21  0605   WBC 5.4 6.5 7.0   HGB 13.6 13.0 13.0   MCV 93 96 95    387 303     Most Recent 3 BMP's:Recent Labs   Lab Test 09/19/21 0202 09/18/21 1717 09/12/21  1112 09/11/21  0952 09/11/21  0646 09/10/21  1134 09/10/21  0601   NA  --  137  --   --  140  --  140   POTASSIUM  --  3.9  --   --  3.9  --  3.8   CHLORIDE  --  103  --   --  104  --  104   CO2  --  30  --   --  28  --  28   BUN  --  13  --   --  8  --  8   CR  --  0.73  --   --  0.60  --  0.66   ANIONGAP  --  4  --   --  8  --  8   KASSI  --  9.2  --   --  9.0  --  9.2   * 95 131*   < > 115*   < > 83    < > = values in this interval not displayed.     Most Recent 2 LFT's:Recent Labs   Lab Test 09/18/21  1717 09/08/21  0605   AST 29 59*   ALT 77* 126*   ALKPHOS 133 99   BILITOTAL 0.3 1.2     Most Recent 3 INR's:Recent Labs   Lab Test 07/20/21  0943 07/13/21  1731 01/16/18  0816   INR 1.13 1.10 1.05     Most Recent 3 Creatinines:Recent Labs   Lab Test 09/18/21  1717 09/11/21  0646 09/10/21  0601   CR 0.73 0.60 0.66     Most Recent 3 Hemoglobins:Recent Labs   Lab Test 09/18/21  1717 09/16/21  1308 09/08/21  0605    HGB 13.6 13.0 13.0     Most Recent 3 Troponin's:Recent Labs   Lab Test 10/23/15  1554   TROPI <0.015  The 99th percentile for upper reference range is 0.045 ug/L.  Troponin values in   the range of 0.045 - 0.120 ug/L may be associated with risks of adverse   clinical events.       Most Recent TSH and T4:Recent Labs   Lab Test 09/16/21  1308 02/19/19  1651 03/23/18  0958   TSH 0.67   < > 0.74   T4  --   --  1.13    < > = values in this interval not displayed.     Most Recent Hemoglobin A1c:Recent Labs   Lab Test 09/18/21  1717   A1C 5.5     Most Recent 6 glucoses:Recent Labs   Lab Test 09/19/21  0202 09/18/21  1717 09/12/21  1112 09/12/21  0733 09/12/21  0209 09/11/21  1312   * 95 131* 141* 129* 147*     Most Recent Urinalysis:Recent Labs   Lab Test 09/08/21  0314   COLOR Straw   APPEARANCE Clear   URINEGLC Negative   URINEBILI Negative   URINEKETONE 10 *   SG 1.010   UBLD Negative   URINEPH 5.5   PROTEIN Negative   NITRITE Negative   LEUKEST Trace*   RBCU 1   WBCU 3     Most Recent ESR & CRP:Recent Labs   Lab Test 09/16/21  1308 12/29/16  0620   SED 10  --    CRP  --  90.0*     5.4    \    13.6    /    426   N 36    L N/A    137    103    13 /   ------------------------------------ 162 (H)   ALT 77 (H)   AST 29      ALB 3.8   Ca 9.2  3.9    30    0.73 \    % RETIC N/A    LDH N/A  Troponin N/A    BNP N/A    CK N/A  INR N/A   PTT N/A    D-dimer N/A    Fibrinogen N/A    Antithrombin N/A  Ferritin N/A  CRP N/A    IL-6 N/A  Recent Results (from the past 24 hour(s))   CT Abdomen Pelvis w Contrast    Narrative    EXAM: CT ABDOMEN PELVIS W CONTRAST  LOCATION: [facility]  DATE/TIME: 9/18/2021 8:56 PM    INDICATION: Abdominal pain, history of pancreatectomy and auto transplantation, anastomotic strictures.  COMPARISON: CT enterography performed 07/20/2021.  TECHNIQUE: CT scan of the abdomen and pelvis was performed following injection of IV contrast. Multiplanar  reformats were obtained. Dose reduction techniques were used.  CONTRAST: Iopamidol (Isovue-370) solution 80 mL.       FINDINGS:   LOWER CHEST: Trace bilateral pleural effusions.    HEPATOBILIARY: Status post cholecystectomy. Trace pneumobilia.    PANCREAS: Status post distal pancreatectomy.    SPLEEN: Status post splenectomy.    ADRENAL GLANDS: Normal.    KIDNEYS/BLADDER: Normal.    BOWEL: GJ tube in situ. No obstruction, colitis, diverticulitis, or appendicitis. No free air or fluid.    LYMPH NODES: Normal.    VASCULATURE: No aneurysm. Retroaortic left renal vein. Widely patent arterial vasculature. Widely patent portal vasculature. Widely patent hepatic veins.    PELVIC ORGANS: Absent uterus.    MUSCULOSKELETAL: Salt and pepper lesion in the L2 vertebral body, not significantly changed consistent with a vertebral hemangioma.      Impression    IMPRESSION:   1.  Postsurgical changes consistent with distal pancreatectomy, splenectomy, and cholecystectomy.  2.  No obstruction, colitis, diverticulitis, or appendicitis.  3.  Mild pneumobilia.  4.  GJ tube in situ.

## 2021-09-19 NOTE — PHARMACY-CONSULT NOTE
Pharmacy Tube Feeding Consult    Medication reviewed for administration by feeding tube and for potential food/drug interactions.    Recommendation: No changes are needed at this time.     Pharmacy will continue to follow as new medications are ordered.    Usha Hughes, RajivD

## 2021-09-19 NOTE — PROGRESS NOTES
[facility]  Emergency Department Observation Unit Daily Progress Note          Assessment & Plan:   Dinora Mcghee is a 56 year old female admitted on 9/18/2021. She has a PMH notable for hypothyroidism, GERD, allergic rhinitis, migraines, hiatal hernia, lumbar DDD, pituitary adenoma s/p resection, IgG4 pseudotumor of liver, chronic pancreatitis s/p TPAIT, gastroparesis, FTT, admitted 9/7-9/12/21 for PEG tube replacement by percutaneous GJ tube (9/7/21), complicated by displacement of the GJ tube, ultimately undergoing EGD with GI for replacement of PEG J-tube with T tag gastropexy on 9/10/21, presenting now with worsening pain at the enteric tube sites and some fullness in this area.     ##PEGJ Tube Pain:   ##S/p Percutaneous GJ tube placement (9/7/21):  ##S/p Replacement of PEG J-tube with T tag gastropexy (9/10/21):  ##Gastroparesis:  ##Chronic pancreatitis s/p TPAIT:   Patient presented to the ED with pain at the enteric tube site, worse with movement. PTA she was doing 3 cans of Peptamen from 9pm to 1pm currently at 75ml/hr with a goal of 90 ml/hr. She is able to tolerate oral intake. However, due to pain she PEG was placed. In the ED labs showed CMP with ALT 77 otherwise normal.  Normal lactic acid, lipase, CBC.  COVID-19 PCR on 9/16/2021 was negative. CT abdomen pelvis with contrast reports postsurgical changes consistent with distal pancreatectomy, splenectomy, cholecystectomy; no obstruction colitis diverticulitis or appendicitis; GJ tube in situ. In the ED the patient was rgiven Dilaudid IV 0.2 mg x 2, Dilaudid IV 0.5 mg x 2, Compazine 5 mg IV x1,  Phenergan 12.5 mg IV x1. Patient was evaluated by GI in ED. This am she notes ongoing pain. Dilaudid does help.   - GI consulted, plan for Dr. Park to see the patient at 1400.    - NPO until evaluate by Dr. Park   - MIVF with NS 125ml/hr  - Protonix 40mg IV BID  - Pepcid 20mg IV BID per home regimen  - BG checks  "q4h  - Compazine or Phenergan prn nausea  - Dilaudid 0.3mg IV q3h prn pain, PO Dilaudid 2 mg's PO PRN pain   - Pain consult per GI recommendations     Addendum 1600: Discussed with GI who recommended restarting TF, G tube to low intermittent suction, in-patient admission, and pain consult.     Discussed with pain team who agreed with Dilaudid 2-4 mg's PO pain, recommended to increased Flexeril from 5 to 10 mg's TID, and lidocaine cream to site. Discussed with patient who is in agreement. She asked to message her out-patient psychologist to alert her of her admission. I did sent an epic message.       ##Hx of IgG4 pseudotumor: Mild LFT abnormality with mildly elevated IgG4. Liver lesion previously biopsied with noticeable proliferation and acute and chronic inflammation and fibrosis of liver with focally increased IgG4-positive plasma cells consistent with IgG4 pseudotumor. Followed by hepatology.   - Outpatient follow up with GI      ##Chronic constipation:   - Resume PTA Motegrity 2 mg daily (will bring from home if admitted > 1 day)  - Continue PTA Miralax daily,  Senna-colace 1-2 tabs BID,      ##GERD:   - Resume famotidine po 20 mg BID, omeprazole po 40 mg BID at discharge     ##RAD:  ##Allergic rhinitis:   - Continue Cetrizine 10 mg daily  - Continue Benadryl 25 mg Q6H PRN  - Resume PTA montelukast 10 mg daily at discharge     ##Hypothyroidism:   - Continue PTA Levothyroxine 137 mcg daily      ##Hx of migraine HA:   - Continue PTA Amitriptyline 60 mg at bedtime  - Continue Zolmitriptan 5 mg as needed at onset of headache           FEN: NPO  Lines: PIV  Prophylaxis: early ambulation          Consults:   GI  Pain         Discharge Planning:   Pending symptom management and GI plan of care         Interval History:   Resting in bed. Noes ongoing pain to PEG site.            Physical Exam:   BP 97/68 (BP Location: Right arm)   Pulse 71   Temp 97.6  F (36.4  C) (Oral)   Resp 18   Ht 1.702 m (5' 7\")   Wt 59 kg " (130 lb)   SpO2 97%   BMI 20.36 kg/m       GENERAL: Alert and oriented x 3. NAD.   HEENT: Anicteric sclera. Mucous membranes moist.   CV: RRR. S1, S2. No murmurs appreciated.   RESPIRATORY: Effort normal. Lungs CTAB with no wheezing, rales, rhonchi.   GI: PEG in place, TTP to site. O/w abdomen ios soft and non distended with normoactive bowel sounds present in all quadrants  NEUROLOGICAL: No focal deficits. Moves all extremities.    EXTREMITIES: No peripheral edema. Intact bilateral pedal pulses.   SKIN: No jaundice. No rashes.     Medication list reviewed.   Today's labs and imaging were reviewed.     NATALY Carias, CNP  Emergency Department Observation Unit    Results for orders placed or performed during the hospital encounter of 09/18/21   CT Abdomen Pelvis w Contrast     Status: None    Narrative    EXAM: CT ABDOMEN PELVIS W CONTRAST  LOCATION: [facility]  DATE/TIME: 9/18/2021 8:56 PM    INDICATION: Abdominal pain, history of pancreatectomy and auto transplantation, anastomotic strictures.  COMPARISON: CT enterography performed 07/20/2021.  TECHNIQUE: CT scan of the abdomen and pelvis was performed following injection of IV contrast. Multiplanar reformats were obtained. Dose reduction techniques were used.  CONTRAST: Iopamidol (Isovue-370) solution 80 mL.       FINDINGS:   LOWER CHEST: Trace bilateral pleural effusions.    HEPATOBILIARY: Status post cholecystectomy. Trace pneumobilia.    PANCREAS: Status post distal pancreatectomy.    SPLEEN: Status post splenectomy.    ADRENAL GLANDS: Normal.    KIDNEYS/BLADDER: Normal.    BOWEL: GJ tube in situ. No obstruction, colitis, diverticulitis, or appendicitis. No free air or fluid.    LYMPH NODES: Normal.    VASCULATURE: No aneurysm. Retroaortic left renal vein. Widely patent arterial vasculature. Widely patent portal vasculature. Widely patent hepatic veins.    PELVIC ORGANS: Absent uterus.    MUSCULOSKELETAL:  Salt and pepper lesion in the L2 vertebral body, not significantly changed consistent with a vertebral hemangioma.      Impression    IMPRESSION:   1.  Postsurgical changes consistent with distal pancreatectomy, splenectomy, and cholecystectomy.  2.  No obstruction, colitis, diverticulitis, or appendicitis.  3.  Mild pneumobilia.  4.  GJ tube in situ.   Comprehensive metabolic panel     Status: Abnormal   Result Value Ref Range    Sodium 137 133 - 144 mmol/L    Potassium 3.9 3.4 - 5.3 mmol/L    Chloride 103 94 - 109 mmol/L    Carbon Dioxide (CO2) 30 20 - 32 mmol/L    Anion Gap 4 3 - 14 mmol/L    Urea Nitrogen 13 7 - 30 mg/dL    Creatinine 0.73 0.52 - 1.04 mg/dL    Calcium 9.2 8.5 - 10.1 mg/dL    Glucose 95 70 - 99 mg/dL    Alkaline Phosphatase 133 40 - 150 U/L    AST 29 0 - 45 U/L    ALT 77 (H) 0 - 50 U/L    Protein Total 7.7 6.8 - 8.8 g/dL    Albumin 3.8 3.4 - 5.0 g/dL    Bilirubin Total 0.3 0.2 - 1.3 mg/dL    GFR Estimate >90 >60 mL/min/1.73m2   Extra Blue Top Tube     Status: None   Result Value Ref Range    Hold Specimen JIC    Extra Red Top Tube     Status: None   Result Value Ref Range    Hold Specimen JIC    Extra Green Top (Lithium Heparin) Tube     Status: None   Result Value Ref Range    Hold Specimen JIC    Extra Purple Top Tube     Status: None   Result Value Ref Range    Hold Specimen JIC    CBC with platelets and differential     Status: None   Result Value Ref Range    WBC Count 5.4 4.0 - 11.0 10e3/uL    RBC Count 4.29 3.80 - 5.20 10e6/uL    Hemoglobin 13.6 11.7 - 15.7 g/dL    Hematocrit 39.8 35.0 - 47.0 %    MCV 93 78 - 100 fL    MCH 31.7 26.5 - 33.0 pg    MCHC 34.2 31.5 - 36.5 g/dL    RDW 13.3 10.0 - 15.0 %    Platelet Count 426 150 - 450 10e3/uL    % Neutrophils 36 %    % Lymphocytes 34 %    % Monocytes 22 %    % Eosinophils 7 %    % Basophils 1 %    % Immature Granulocytes 0 %    NRBCs per 100 WBC 0 <1 /100    Absolute Neutrophils 2.0 1.6 - 8.3 10e3/uL    Absolute Lymphocytes 1.9 0.8 - 5.3 10e3/uL     Absolute Monocytes 1.2 0.0 - 1.3 10e3/uL    Absolute Eosinophils 0.4 0.0 - 0.7 10e3/uL    Absolute Basophils 0.1 0.0 - 0.2 10e3/uL    Absolute Immature Granulocytes 0.0 <=0.0 10e3/uL    Absolute NRBCs 0.0 10e3/uL   Lipase     Status: Abnormal   Result Value Ref Range    Lipase 12 (L) 73 - 393 U/L   RBC and Platelet Morphology     Status: Abnormal   Result Value Ref Range    Platelet Assessment  Automated Count Confirmed. Platelet morphology is normal.     Automated Count Confirmed. Platelet morphology is normal.    Acanthocytes Slight (A) None Seen    [place] Cells Slight (A) None Seen    Target Cells Slight (A) None Seen    RBC Morphology Confirmed RBC Indices    Lactic acid whole blood     Status: Normal   Result Value Ref Range    Lactic Acid 0.7 0.7 - 2.0 mmol/L   Hemoglobin A1c     Status: Normal   Result Value Ref Range    Hemoglobin A1C 5.5 0.0 - 5.6 %   Glucose by meter     Status: Abnormal   Result Value Ref Range    GLUCOSE BY METER POCT 162 (H) 70 - 99 mg/dL   Glucose by meter     Status: Abnormal   Result Value Ref Range    GLUCOSE BY METER POCT 106 (H) 70 - 99 mg/dL   Glucose by meter     Status: Normal   Result Value Ref Range    GLUCOSE BY METER POCT 75 70 - 99 mg/dL   [place] Draw     Status: None    Narrative    The following orders were created for panel order [place] Draw.  Procedure                               Abnormality         Status                     ---------                               -----------         ------                     Extra Blue Top Tube[[id number]]                              Final result               Extra Red Top Tube[[id number]]                               Final result               Extra Green Top (Lithium...[[id number]]                      Final result               Extra Purple Top Tube[[id number]]                            Final result                 Please view results for these tests on the individual orders.   CBC with platelets differential     Status:  Abnormal    Narrative    The following orders were created for panel order CBC with platelets differential.  Procedure                               Abnormality         Status                     ---------                               -----------         ------                     CBC with platelets and d...[[id number]]                      Final result               RBC and Platelet Morphology[[id number]]  Abnormal            Final result                 Please view results for these tests on the individual orders.

## 2021-09-19 NOTE — PLAN OF CARE
"-diagnostic tests and consults completed and resulted: not met    -vital signs normal or at patient baseline: /80 (BP Location: Right arm)   Pulse 86   Temp 97.7  F (36.5  C) (Temporal)   Resp 16   Ht 1.702 m (5' 7\")   Wt 59 kg (130 lb)   SpO2 100%   BMI 20.36 kg/m      -tolerating oral intake to maintain hydration: not met, on  ml/hr    -adequate pain control on oral analgesics: not met, patient on IV dilaudid q3h PRN.    -returns to baseline functional status: not met    -GI consult completed with dispo plan: not met  "

## 2021-09-19 NOTE — CONSULTS
GASTROENTEROLOGY CONSULTATION    Date of Admission:  9/18/2021          ASSESSMENT AND RECOMMENDATIONS:   Dinora Mcghee is a 56 year old female with a history of hypothyroidism, GERD, allergic rhinitis, migraines, hiatal hernia, lumbar DDD, pituitary adenoma s/p resection, IgG4 pseudotumor of liver, chronic pancreatitis s/p TPAIT (2016), gastroparesis, FTT, admitted 9/7-9/12/21 for PEG tube replacement by percutaneous GJ tube (9/7/21), complicated by displacement of the GJ tube, underwent PEG J-tube revision with T tag gastropexy on 9/10/21, presenting with 3 days of pain at the enteric tube sites and some fullness in the PEGJ tube insertion site.    As per Dr. Park's note  - Pt had same intolerance of previous GJ pre-TPIAT, and post TPIAT, therefore may be a visceral intolerance BUT clearly has acute/semi acute inflammation around PEG tube tract, that may or may not resolve.   - Spasm pain probably due to jejunal peristalsis pulling on G tube and impacting bumper in abd wall.  - Has gastroparesis so gastric distention probably playing a role     REC SHORT TERM:  1) Place G tube to low intermittent suction  2) Restart J tube feedings  3) Decrease IV fluids  4) Pain consult     OPTIONS LONG TERM:  1) Pain control, above interventions, and wait for pain to improve  2) If not improved change GJ to short G tube, loose, to allow tract to reduce inflammation  3) Remove GJ altogether, but would leave pt back to square 1 minus a few steps, worse pain    Gastroenterology follow up recommendations: TBD    Thank you for involving us in this patient's care. Please do not hesitate to contact the GI service with any questions or concerns.     Patient care plan discussed with Dr. Park, GI staff physician.    Maren Flores MD  GI fellow  Page [phone]          Chief Complaint:   We were asked by Maria Isabel Lama PA  of observation unit to evaluate this patient with PEGJ tube discomfort  History is  obtained from the patient and the medical record.          History of Present Illness:   Dinora Mcghee is a 56 year old female with a history of hypothyroidism, GERD, allergic rhinitis, migraines, hiatal hernia, lumbar DDD, pituitary adenoma s/p resection, IgG4 pseudotumor of liver, chronic pancreatitis s/p TPAIT (2016), gastroparesis, FTT, admitted 9/7-9/12/21 for PEG tube replacement by percutaneous GJ tube (9/7/21), complicated by displacement of the GJ tube, underwent PEG J-tube revision with T tag gastropexy on 9/10/21, presenting with 3 days of pain at the enteric tube sites and some fullness in the PEGJ tube insertion site.     For the past 3 days, has very painful at surgery site. The tube keep suck in into the body. Tilted the tube. Minimal leakage of tube feeding. Surrounding area is irritated. Tried to adjust loosening the PEGJ tube at ED, but became very painful at the PEGJ site. Admitted in observation unit for pain control. Feeding tube is functioning well, now feeding at goal 75 mL/hr.     Patient has low grade fever post COVID vaccine the day prior to admission. No longer having fever. Apart from pain around PEGJ  Tube, feeling well.             Past Medical History:   Reviewed and edited as appropriate  Past Medical History:   Diagnosis Date     Allergic rhinitis, cause unspecified      Allergy to other foods     strawberries, apples, celeries, alice, watermelon     Arthritis     left knee     Choledocholithiasis     long after cholecystectomy     Chronic abdominal pain      Chronic constipation      Chronic nausea      Chronic pancreatitis (H)      Degeneration of lumbar or lumbosacral intervertebral disc      Esophageal reflux     w/ hiatal hernia     Gastroparesis      Hiatal hernia      History of pituitary adenoma     s/p resection     Hypothyroidism      Migraines      Mild hyperlipidemia      On tube feeding diet     presence of GJ tube     Pancreatic disease     PD stricture, 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 endometriosis growth     APPENDECTOMY       C  DELIVERY ONLY       C  DELIVERY ONLY      repeat c section with incidental cystotomy with repair     C EXCIS PITUITARY,TRANSNASAL/SEPTAL      pituitary tumor removed for diabetes insipidus     C TOTAL ABDOM HYSTERECTOMY      w/ bilateral salpingoophorectomy       SECTION       COLONOSCOPY       ENDOSCOPIC RETROGRADE CHOLANGIOPANCREATOGRAM N/A 2015    Procedure: ENDOSCOPIC RETROGRADE CHOLANGIOPANCREATOGRAM;  Surgeon: Mandeep Park MD;  [facility]: [facility]     ENDOSCOPIC RETROGRADE CHOLANGIOPANCREATOGRAM N/A 2016    Procedure: COMBINED ENDOSCOPIC RETROGRADE CHOLANGIOPANCREATOGRAPHY, PLACE TUBE/STENT;  Surgeon: Mandeep Park MD;  [facility]     ENDOSCOPIC RETROGRADE CHOLANGIOPANCREATOGRAM N/A 3/17/2016    Procedure: COMBINED ENDOSCOPIC RETROGRADE CHOLANGIOPANCREATOGRAPHY, REMOVE FOREIGN BODY OR STENT/TUBE;  Surgeon: Mandeep Park MD;  [facility]: [facility]     ENDOSCOPIC RETROGRADE CHOLANGIOPANCREATOGRAM N/A 2016    Procedure: COMBINED ENDOSCOPIC RETROGRADE CHOLANGIOPANCREATOGRAPHY, PLACE TUBE/STENT;  Surgeon: Mandeep Park MD;  [facility]     ENDOSCOPIC RETROGRADE CHOLANGIOPANCREATOGRAM N/A 2016    Procedure: COMBINED ENDOSCOPIC RETROGRADE CHOLANGIOPANCREATOGRAPHY, REMOVE FOREIGN BODY OR STENT/TUBE;  Surgeon: Mandeep Park MD;  [facility]     ENDOSCOPIC ULTRASOUND UPPER GASTROINTESTINAL TRACT (GI) N/A 10/3/2016    Procedure: ENDOSCOPIC ULTRASOUND, ESOPHAGOSCOPY / UPPER GASTROINTESTINAL TRACT (GI);  Surgeon:  Guru Jose Rodas MD;  [facility]     ESOPHAGOSCOPY, GASTROSCOPY, DUODENOSCOPY (EGD), COMBINED N/A 6/24/2015    Procedure: COMBINED ESOPHAGOSCOPY, GASTROSCOPY, DUODENOSCOPY (EGD), REMOVE FOREIGN BODY;  Surgeon: Mandeep Park MD;  [facility]     ESOPHAGOSCOPY, GASTROSCOPY, DUODENOSCOPY (EGD), COMBINED N/A 10/25/2015    Procedure: COMBINED ESOPHAGOSCOPY, GASTROSCOPY, DUODENOSCOPY (EGD);  Surgeon: Sammy Amaro MD;  [facility]     ESOPHAGOSCOPY, GASTROSCOPY, DUODENOSCOPY (EGD), COMBINED N/A 10/25/2015    Procedure: COMBINED ESOPHAGOSCOPY, GASTROSCOPY, DUODENOSCOPY (EGD), BIOPSY SINGLE OR MULTIPLE;  Surgeon: Sammy Amaro MD;  [facility]     ESOPHAGOSCOPY, GASTROSCOPY, DUODENOSCOPY (EGD), DILATATION, COMBINED       EXCISE LESION TRUNK N/A 4/17/2017    Procedure: EXCISE LESION TRUNK;  Removal of Abdominal Foreign Body;  Surgeon: Nestor Phoenix MD;  [facility]     HC ESOPH/GAS REFLUX TEST W NASAL IMPED >1 HR N/A 11/19/2015    Procedure: ESOPHAGEAL IMPEDENCE FUNCTION TEST WITH 24 HOUR PH GREATER THAN 1 HOUR;  Surgeon: Thiago Apple MD;  Location: [facility]     HC UGI ENDOSCOPY DIAG W BIOPSY  9/17/08     HC UGI ENDOSCOPY DIAG W BIOPSY  9/27/12     HC UGI ENDOSCOPY W ESOPHAGEAL DILATION BALLOON <30MM  9/17/08     HC UGI ENDOSCOPY W EUS N/A 5/5/2015    Procedure: COMBINED ENDOSCOPIC ULTRASOUND, ESOPHAGOSCOPY, GASTROSCOPY, DUODENOSCOPY (EGD);  Surgeon: Wm Dueñas MD;  Location: [facility]     HC WRIST ARTHROSCOP,RELEASE XVERS LIG Bilateral 12/17/08     INJECT TRANSVERSUS ABDOMINIS PLANE (TAP) BLOCK BILATERAL Left 9/22/2016    Procedure: INJECT TRANSVERSUS ABDOMINIS PLANE (TAP) BLOCK BILATERAL;  Surgeon: Dickson Corrigan MD;  [facility]     laparoscopic pineda  1995     LAPAROSCOPIC HERNIORRHAPHY INCISIONAL N/A 8/23/2018    Procedure: LAPAROSCOPIC HERNIORRHAPHY INCISIONAL;  Laparoscopic Incisional Hernia Repair with Symbotex Mesh Implant;   Surgeon: Nestor Phoenix MD;  [facility]     LAPAROSCOPIC PANCREATECTOMY, TRANSPLANT AUTO ISLET CELL N/A 2016    Procedure: LAPAROSCOPIC PANCREATECTOMY, TRANSPLANT AUTO ISLET CELL;  Surgeon: Nestor Phoenix MD;  [facility]     REPLACE GASTROJEJUNOSTOMY TUBE, PERCUTANEOUS N/A 2021    Procedure: GASTROJEJUNOSTOMY TUBE PLACEMENT, PERCUTANEOUS, WITH GASTROPEXY;  Surgeon: Mandeep Park MD;  [facility]     REPLACE JEJUNOSTOMY TUBE, PERCUTANEOUS N/A 9/10/2021    Procedure: UPPER ENDOSCOPY, REPLACEMENT OF PERCUTANEOUS GASTROJEJUNOSTOMY TUBE, T-TAG GASTROPEXY;  Surgeon: Zackery Montoya MD;  Location[facility]     transphenoidal pituitary resection              Previous Endoscopy:   No results found. However, due to the size of the patient record, not all encounters were searched. Please check Results Review for a complete set of results.         Social History:   Reviewed and edited as appropriate  Social History     Socioeconomic History     Marital status:      Spouse name: Norris     Number of children: 3     Years of education: 16     Highest education level: Not on file   Occupational History     Occupation: director     Employer: [employer]   Tobacco Use     Smoking status: Former Smoker     Packs/day: 0.50     Years: 6.00     Pack years: 3.00     Types: Cigarettes     Start date: 1985     Quit date: 1992     Years since quittin.7     Smokeless tobacco: Never Used     Tobacco comment: no 2nd hand   Substance and Sexual Activity     Alcohol use: Not Currently     Alcohol/week: 3.0 - 6.0 standard drinks     Types: 1 - 2 Glasses of wine, 1 - 2 Cans of beer, 1 - 2 Shots of liquor per week     Comment: none since IGG     Drug use: No     Sexual activity: Yes     Partners: Male     Birth control/protection: None     Comment: Hystectomy    Other Topics Concern     Parent/sibling w/ CABG, MI or angioplasty before 65F 55M? No      Service Not Asked     Blood  Transfusions No     Caffeine Concern Not Asked     Occupational Exposure Not Asked     Hobby Hazards Not Asked     Sleep Concern Not Asked     Stress Concern Not Asked     Weight Concern Not Asked     Special Diet Not Asked     Back Care Not Asked     Exercise Not Asked     Bike Helmet Not Asked     Seat Belt Yes     Self-Exams Not Asked   Social History Narrative     with 3 children and a dog.  No smoking, etoh or drug use.  Worked as a  for [employer] in [place] in the past.  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     Social Determinants of Health     Financial Resource Strain:      Difficulty of Paying Living Expenses:    Food Insecurity:      Worried About Running Out of Food in the Last Year:      Ran Out of Food in the Last Year:    Transportation Needs:      Lack of Transportation (Medical):      Lack of Transportation (Non-Medical):    Physical Activity:      Days of Exercise per Week:      Minutes of Exercise per Session:    Stress:      Feeling of Stress :    Social Connections:      Frequency of Communication with Friends and Family:      Frequency of Social Gatherings with Friends and Family:      Attends Mandaen Services:      Active Member of Clubs or Organizations:      Attends Club or Organization Meetings:      Marital Status:    Intimate Partner Violence:      Fear of Current or Ex-Partner:      Emotionally Abused:      Physically Abused:      Sexually Abused:             Family History:   Reviewed and edited as appropriate  No known history of gastrointestinal/liver disease or  gastrointestinal malignancies       Allergies:   Reviewed and edited as appropriate     Allergies   Allergen Reactions     Apple Anaphylaxis     Corticosteroids Other (See Comments)     All oral, IV and injectable steroids are contraindicated (unless in life threatening situations) in Islet Auto transplant recipients. They can cause irreversible loss of islet  cell function. Please contact patient's transplant care coordinator ADI Gaffney RN at [phone]/pager [phone] and/or endocrinologist prior to administration.       Depakote [Divalproex Sodium] Other (See Comments)     Chest pain     Zithromax [Azithromycin Dihydrate] Anaphylaxis     Noted in 4/7/08 ER     Darvocet [Propoxyphene N-Apap] Itching     Morphine Nausea and Vomiting and Rash     Nalbuphine Hcl Rash     RASH :nubaine     Zosyn [Piperacillin-Tazobactam In D5w] Rash     Possible allergy, did have a diffuse rash that seemed drug related but could have also been related to soap in the hospital.      Bactrim [Sulfamethoxazole W-Trimethoprim] Other (See Comments) and Nausea and Vomiting     Severely low liver function.     Cats      Compazine [Prochlorperazine] Other (See Comments)     Twitching. Takes Benedryl and is fine     Dust Mites      Grass      Ragweeds      Tape [Adhesive Tape] Blisters     Tramadol      Trees      Zofran [Ondansetron] Other (See Comments)     migraine     Flagyl [Metronidazole] Hives and Rash            Medications:     Medications Prior to Admission   Medication Sig Dispense Refill Last Dose     amitriptyline (ELAVIL) 10 MG tablet Take 10 mg by mouth At Bedtime Take with a 50mg tab for a total of 60mg        amitriptyline (ELAVIL) 50 MG tablet Take 1 tablet (50 mg) by mouth At Bedtime 90 tablet 3      azelastine (ASTELIN) 0.1 % nasal spray Spray 1 spray into both nostrils 2 times daily 1 Bottle 11      blood glucose (PAT CONTOUR NEXT) test strip Use to test blood sugar 6 times daily or as directed. 2 Box 11      blood glucose monitoring (PAT MICROLET) lancets Use to test blood sugar 6-8 times daily or as directed. 100 each 11      cetirizine (ZYRTEC) 10 MG tablet Take 1 tablet (10 mg) by mouth daily (Patient taking differently: Take 10 mg by mouth every morning ) 90 tablet 3      Continuous Blood Gluc Sensor (FREESTYLE LISA 2 SENSOR) MISC 1 each every 14 days 2 each 11       cyclobenzaprine (FLEXERIL) 5 MG tablet Take 1 tablet (5 mg) by mouth 3 times daily 12 tablet 0      diphenhydrAMINE (BENADRYL ALLERGY) 25 MG capsule Take 1 capsule (25 mg) by mouth every 6 hours as needed for itching or allergies 56 capsule 0      estradiol (VAGIFEM) 10 MCG TABS vaginal tablet Place 1 tablet (10 mcg) vaginally twice a week 24 tablet 3      famotidine (PEPCID) 20 MG tablet Take 1 tablet (20 mg) by mouth 2 times daily (Patient taking differently: Take 20 mg by mouth every morning ) 180 tablet 3      FOLIC ACID PO Take 1 mg by mouth every morning         glucose 40 % GEL gel Take 15-30 g by mouth every 15 minutes as needed for low blood sugar 3 Tube 2      ibuprofen (ADVIL/MOTRIN) 400 MG tablet Take 1 tablet (400 mg) by mouth every 6 hours as needed for moderate pain (Patient taking differently: Take 400 mg by mouth every 6 hours as needed for moderate pain ) 30 tablet 0      levothyroxine (SYNTHROID) 137 MCG tablet Take 1 tablet (137 mcg) by mouth daily (Patient taking differently: Take 137 mcg by mouth every morning ) 90 tablet 3      montelukast (SINGULAIR) 10 MG tablet TAKE 1 TABLET(10 MG) BY MOUTH AT BEDTIME 90 tablet 3      multivitamin CF formula (CHOICEFUL) capsule Take 1 tablet by mouth daily (Patient taking differently: Take 1 tablet by mouth every morning )  11      omeprazole (PRILOSEC) 40 MG DR capsule Take 1 capsule (40 mg) by mouth 2 times daily (Patient taking differently: Take 40 mg by mouth every evening ) 180 capsule 3      order for DME Equipment being ordered:Cefaly 1 Device 0      polyethylene glycol (MIRALAX) 17 GM/Dose powder Take 17 g (1 capful) by mouth daily (Patient taking differently: Take 1 capful by mouth every morning ) 507 g 11      prochlorperazine (COMPAZINE) 5 MG tablet Take 1 tablet (5 mg) by mouth every 6 hours as needed Take two 5 mg tablets by mouth every six hours as needed for nausea. 60 tablet 1      promethazine (PHENERGAN) 25 MG tablet Take 1 tablet (25  "mg) by mouth daily as needed 60 tablet 1      Prucalopride Succinate (MOTEGRITY) 2 MG TABS Take 2 mg by mouth daily (Patient taking differently: Take 2 mg by mouth every morning ) 30 tablet 11      scopolamine (TRANSDERM) 1 MG/3DAYS 72 hr patch Place 1 patch onto the skin every 72 hours 10 patch 11      senna-docusate (SENOKOT-S;PERICOLACE) 8.6-50 MG per tablet Take 1-2 tablets by mouth 2 times daily (Patient taking differently: Take 1-2 tablets by mouth every morning ) 100 tablet 0      Sharps Container (BD SHARPS ) MISC 1 Container as needed 1 each 1      ZOLMitriptan (ZOMIG) 5 MG tablet Take 1 tablet (5 mg) by mouth at onset of headache for migraine 9 tablet 11              Review of Systems:     A complete review of systems was performed and is negative except as noted in the HPI           Physical Exam:   BP 92/57 (BP Location: Right arm)   Pulse 73   Temp 98.1  F (36.7  C) (Oral)   Resp 16   Ht 1.702 m (5' 7\")   Wt 59 kg (130 lb)   SpO2 93%   BMI 20.36 kg/m    Wt:   Wt Readings from Last 2 Encounters:   09/18/21 59 kg (130 lb)   09/16/21 59.9 kg (132 lb)      Constitutional: cooperative, pleasant, not dyspneic/diaphoretic, no acute distress  Eyes: Sclera anicteric/injected  Ears/nose/mouth/throat: mucus membranes moist, hearing intact  Neck: supple, thyroid normal size  CV: No edema  Respiratory: Unlabored breathing  Lymph: No axillary, submandibular, supraclavicular or inguinal lymphadenopathy  Abdomen: Non-distended, +bs, no hepatosplenomegaly, Tender around PEGJ tube area, not tender on other area, no peritoneal signs, PEGJ tube with surrounding erythema, non-exudative.   Skin: warm, perfused, no jaundice  Neuro: AAO x 3, No asterixis  Psych: Normal affect                 Data:   Labs and imaging below were independently reviewed and interpreted    BMP  Recent Labs   Lab 09/19/21  0638 09/19/21  0202 09/18/21  1717 09/12/21  1112   NA  --   --  137  --    POTASSIUM  --   --  3.9  --  "   CHLORIDE  --   --  103  --    KASSI  --   --  9.2  --    CO2  --   --  30  --    BUN  --   --  13  --    CR  --   --  0.73  --    * 162* 95 131*     CBC  Recent Labs   Lab 09/18/21  1717 09/16/21  1308 09/16/21  1308   WBC 5.4  --  6.5   RBC 4.29  --  4.15   HGB 13.6   < > 13.0   HCT 39.8  --  39.7   MCV 93  --  96   MCH 31.7  --  31.3   MCHC 34.2  --  32.7   RDW 13.3  --  13.5     --  387    < > = values in this interval not displayed.     INRNo lab results found in last 7 days.  LFTs  Recent Labs   Lab 09/18/21 1717   ALKPHOS 133   AST 29   ALT 77*   BILITOTAL 0.3   PROTTOTAL 7.7   ALBUMIN 3.8      PANC  Recent Labs   Lab 09/18/21  1936   LIPASE 12*     Imaging: CT abd/pel w/contrast  1.  Postsurgical changes consistent with distal pancreatectomy, splenectomy, and cholecystectomy.  2.  No obstruction, colitis, diverticulitis, or appendicitis.  3.  Mild pneumobilia.  4.  GJ tube in situ.

## 2021-09-20 LAB
GLUCOSE BLDC GLUCOMTR-MCNC: 103 MG/DL (ref 70–99)
GLUCOSE BLDC GLUCOMTR-MCNC: 109 MG/DL (ref 70–99)
GLUCOSE BLDC GLUCOMTR-MCNC: 121 MG/DL (ref 70–99)
GLUCOSE BLDC GLUCOMTR-MCNC: 128 MG/DL (ref 70–99)
GLUCOSE BLDC GLUCOMTR-MCNC: 152 MG/DL (ref 70–99)
GLUCOSE BLDC GLUCOMTR-MCNC: 96 MG/DL (ref 70–99)
GLUCOSE BLDC GLUCOMTR-MCNC: 98 MG/DL (ref 70–99)
HOLD SPECIMEN: NORMAL
MAGNESIUM SERPL-MCNC: 2.1 MG/DL (ref 1.6–2.3)
PHOSPHATE SERPL-MCNC: 3.2 MG/DL (ref 2.5–4.5)

## 2021-09-20 PROCEDURE — 250N000009 HC RX 250: Performed by: STUDENT IN AN ORGANIZED HEALTH CARE EDUCATION/TRAINING PROGRAM

## 2021-09-20 PROCEDURE — 258N000003 HC RX IP 258 OP 636: Performed by: PHYSICIAN ASSISTANT

## 2021-09-20 PROCEDURE — 250N000013 HC RX MED GY IP 250 OP 250 PS 637: Performed by: PHYSICIAN ASSISTANT

## 2021-09-20 PROCEDURE — G0378 HOSPITAL OBSERVATION PER HR: HCPCS

## 2021-09-20 PROCEDURE — 93005 ELECTROCARDIOGRAM TRACING: CPT

## 2021-09-20 PROCEDURE — 83735 ASSAY OF MAGNESIUM: CPT | Performed by: NURSE PRACTITIONER

## 2021-09-20 PROCEDURE — 84100 ASSAY OF PHOSPHORUS: CPT | Performed by: NURSE PRACTITIONER

## 2021-09-20 PROCEDURE — 250N000013 HC RX MED GY IP 250 OP 250 PS 637: Performed by: INTERNAL MEDICINE

## 2021-09-20 PROCEDURE — 250N000013 HC RX MED GY IP 250 OP 250 PS 637: Performed by: NURSE PRACTITIONER

## 2021-09-20 PROCEDURE — 250N000011 HC RX IP 250 OP 636: Performed by: PHYSICIAN ASSISTANT

## 2021-09-20 PROCEDURE — 250N000013 HC RX MED GY IP 250 OP 250 PS 637: Performed by: EMERGENCY MEDICINE

## 2021-09-20 PROCEDURE — 250N000011 HC RX IP 250 OP 636: Performed by: NURSE PRACTITIONER

## 2021-09-20 PROCEDURE — 99233 SBSQ HOSP IP/OBS HIGH 50: CPT | Performed by: INTERNAL MEDICINE

## 2021-09-20 PROCEDURE — C9113 INJ PANTOPRAZOLE SODIUM, VIA: HCPCS | Performed by: PHYSICIAN ASSISTANT

## 2021-09-20 PROCEDURE — 250N000009 HC RX 250: Performed by: INTERNAL MEDICINE

## 2021-09-20 PROCEDURE — 120N000002 HC R&B MED SURG/OB UMMC

## 2021-09-20 PROCEDURE — 36415 COLL VENOUS BLD VENIPUNCTURE: CPT | Performed by: NURSE PRACTITIONER

## 2021-09-20 PROCEDURE — 999N000127 HC STATISTIC PERIPHERAL IV START W US GUIDANCE

## 2021-09-20 RX ORDER — POLYETHYLENE GLYCOL 3350 17 G/17G
17 POWDER, FOR SOLUTION ORAL 2 TIMES DAILY
Status: DISCONTINUED | OUTPATIENT
Start: 2021-09-21 | End: 2021-09-21

## 2021-09-20 RX ORDER — AZELASTINE 1 MG/ML
1 SPRAY, METERED NASAL 2 TIMES DAILY
Status: DISCONTINUED | OUTPATIENT
Start: 2021-09-20 | End: 2021-09-24 | Stop reason: HOSPADM

## 2021-09-20 RX ORDER — ZOLMITRIPTAN 5 MG/1
5 TABLET, FILM COATED ORAL 2 TIMES DAILY PRN
Status: DISCONTINUED | OUTPATIENT
Start: 2021-09-20 | End: 2021-09-24 | Stop reason: HOSPADM

## 2021-09-20 RX ORDER — ACETAMINOPHEN 325 MG/1
975 TABLET ORAL EVERY 8 HOURS
Status: DISCONTINUED | OUTPATIENT
Start: 2021-09-20 | End: 2021-09-24 | Stop reason: HOSPADM

## 2021-09-20 RX ORDER — SCOLOPAMINE TRANSDERMAL SYSTEM 1 MG/1
1 PATCH, EXTENDED RELEASE TRANSDERMAL
Status: DISCONTINUED | OUTPATIENT
Start: 2021-09-20 | End: 2021-09-24 | Stop reason: HOSPADM

## 2021-09-20 RX ORDER — ZOLMITRIPTAN 5 MG/1
5 TABLET, FILM COATED ORAL DAILY PRN
Status: DISCONTINUED | OUTPATIENT
Start: 2021-09-20 | End: 2021-09-20

## 2021-09-20 RX ORDER — PROMETHAZINE HYDROCHLORIDE 25 MG/1
25 TABLET ORAL EVERY 6 HOURS PRN
Status: DISCONTINUED | OUTPATIENT
Start: 2021-09-20 | End: 2021-09-24 | Stop reason: HOSPADM

## 2021-09-20 RX ORDER — FAMOTIDINE 20 MG/1
20 TABLET, FILM COATED ORAL EVERY MORNING
Status: DISCONTINUED | OUTPATIENT
Start: 2021-09-20 | End: 2021-09-24 | Stop reason: HOSPADM

## 2021-09-20 RX ORDER — LIDOCAINE 40 MG/G
CREAM TOPICAL 2 TIMES DAILY
Status: DISCONTINUED | OUTPATIENT
Start: 2021-09-20 | End: 2021-09-24 | Stop reason: HOSPADM

## 2021-09-20 RX ORDER — MONTELUKAST SODIUM 10 MG/1
10 TABLET ORAL AT BEDTIME
Status: DISCONTINUED | OUTPATIENT
Start: 2021-09-20 | End: 2021-09-24 | Stop reason: HOSPADM

## 2021-09-20 RX ADMIN — PROCHLORPERAZINE MALEATE 10 MG: 10 TABLET ORAL at 07:17

## 2021-09-20 RX ADMIN — POLYETHYLENE GLYCOL 3350 17 G: 17 POWDER, FOR SOLUTION ORAL at 08:29

## 2021-09-20 RX ADMIN — SCOPALAMINE 1 PATCH: 1 PATCH, EXTENDED RELEASE TRANSDERMAL at 21:54

## 2021-09-20 RX ADMIN — PANTOPRAZOLE SODIUM 40 MG: 40 INJECTION, POWDER, FOR SOLUTION INTRAVENOUS at 08:29

## 2021-09-20 RX ADMIN — HYDROMORPHONE HYDROCHLORIDE 4 MG: 2 TABLET ORAL at 17:47

## 2021-09-20 RX ADMIN — PROCHLORPERAZINE MALEATE 10 MG: 10 TABLET ORAL at 22:50

## 2021-09-20 RX ADMIN — DIPHENHYDRAMINE HYDROCHLORIDE 25 MG: 25 CAPSULE ORAL at 22:50

## 2021-09-20 RX ADMIN — AMITRIPTYLINE HYDROCHLORIDE 60 MG: 10 TABLET, FILM COATED ORAL at 21:59

## 2021-09-20 RX ADMIN — HYDROMORPHONE HYDROCHLORIDE 2 MG: 2 TABLET ORAL at 21:59

## 2021-09-20 RX ADMIN — ZOLMITRIPTAN 5 MG: 5 TABLET, FILM COATED ORAL at 09:15

## 2021-09-20 RX ADMIN — PANTOPRAZOLE SODIUM 40 MG: 40 INJECTION, POWDER, FOR SOLUTION INTRAVENOUS at 19:53

## 2021-09-20 RX ADMIN — LEVOTHYROXINE SODIUM 137 MCG: 0.14 TABLET ORAL at 08:28

## 2021-09-20 RX ADMIN — HYDROMORPHONE HYDROCHLORIDE 4 MG: 2 TABLET ORAL at 14:25

## 2021-09-20 RX ADMIN — HYDROMORPHONE HYDROCHLORIDE 4 MG: 2 TABLET ORAL at 06:32

## 2021-09-20 RX ADMIN — CYCLOBENZAPRINE HYDROCHLORIDE 10 MG: 5 TABLET, FILM COATED ORAL at 19:52

## 2021-09-20 RX ADMIN — HYDROMORPHONE HYDROCHLORIDE 4 MG: 2 TABLET ORAL at 02:41

## 2021-09-20 RX ADMIN — DIPHENHYDRAMINE HYDROCHLORIDE 25 MG: 25 CAPSULE ORAL at 02:41

## 2021-09-20 RX ADMIN — DOCUSATE SODIUM 50 MG AND SENNOSIDES 8.6 MG 2 TABLET: 8.6; 5 TABLET, FILM COATED ORAL at 08:28

## 2021-09-20 RX ADMIN — HYDROMORPHONE HYDROCHLORIDE 0.3 MG: 1 INJECTION, SOLUTION INTRAMUSCULAR; INTRAVENOUS; SUBCUTANEOUS at 16:42

## 2021-09-20 RX ADMIN — Medication 15 ML: at 08:32

## 2021-09-20 RX ADMIN — ACETAMINOPHEN 975 MG: 325 TABLET, FILM COATED ORAL at 21:59

## 2021-09-20 RX ADMIN — CYCLOBENZAPRINE HYDROCHLORIDE 10 MG: 5 TABLET, FILM COATED ORAL at 13:59

## 2021-09-20 RX ADMIN — MONTELUKAST 10 MG: 10 TABLET, FILM COATED ORAL at 21:59

## 2021-09-20 RX ADMIN — LIDOCAINE: 40 CREAM TOPICAL at 22:03

## 2021-09-20 RX ADMIN — CETIRIZINE HYDROCHLORIDE 10 MG: 10 TABLET, FILM COATED ORAL at 08:28

## 2021-09-20 RX ADMIN — FAMOTIDINE 20 MG: 20 INJECTION, SOLUTION INTRAVENOUS at 08:29

## 2021-09-20 RX ADMIN — CYCLOBENZAPRINE HYDROCHLORIDE 10 MG: 5 TABLET, FILM COATED ORAL at 08:28

## 2021-09-20 RX ADMIN — ZOLMITRIPTAN 5 MG: 5 TABLET, FILM COATED ORAL at 12:35

## 2021-09-20 RX ADMIN — PROMETHAZINE HYDROCHLORIDE 12.5 MG: 25 INJECTION INTRAMUSCULAR; INTRAVENOUS at 18:43

## 2021-09-20 ASSESSMENT — ENCOUNTER SYMPTOMS
BACK PAIN: 0
DIAPHORESIS: 0
FATIGUE: 0
FLANK PAIN: 0
WEAKNESS: 0
FREQUENCY: 0
CARDIOVASCULAR NEGATIVE: 1
DYSURIA: 0
SHORTNESS OF BREATH: 0
CHILLS: 0
NECK PAIN: 0
FEVER: 0
COUGH: 0

## 2021-09-20 ASSESSMENT — ACTIVITIES OF DAILY LIVING (ADL)
ADLS_ACUITY_SCORE: 10
ADLS_ACUITY_SCORE: 10
FALL_HISTORY_WITHIN_LAST_SIX_MONTHS: YES
WALKING_OR_CLIMBING_STAIRS_DIFFICULTY: NO
DIFFICULTY_EATING/SWALLOWING: NO
DEPENDENT_IADLS:: INDEPENDENT
DOING_ERRANDS_INDEPENDENTLY_DIFFICULTY: NO
VISION_MANAGEMENT: GLASSESS
ADLS_ACUITY_SCORE: 9
CONCENTRATING,_REMEMBERING_OR_MAKING_DECISIONS_DIFFICULTY: NO
DRESSING/BATHING_DIFFICULTY: NO
WEAR_GLASSES_OR_BLIND: YES
TOILETING_ISSUES: NO
DIFFICULTY_COMMUNICATING: NO

## 2021-09-20 NOTE — CONSULTS
Health Psychology                                                                                                                          Sweta Michelle, Ph.D., L.P [phone]  Melva Beebe, Ph.D., L.P. [phone]  Rachel Sloan, Ph.D. [phone]  Kenya Sanchez, Ph.D., L.P. [phone]  Estrada Patel, Ph.D., A.B.P.P., L.P. [phone]         Enedina Joaquin, Ph.D., L.P. [phone]                [facility], 3rd Floor  [address] [place] [address]      Inpatient Health Psychology Consultation    Date of Service:  9/20/21    Received a message from ED Obs staff that Dinora was in the hospital and requested me to stop by.    Stopped by her room and discussed events leading up to admission. She said Dr. Park stopped by and proposed the idea of starting an antidepressant medication. We discussed options for who could prescribe such medication and she expressed interest in meeting either with inpatient C/L psychiatry or outpatient with Dr. White for a consultation.    Will ask Medicine team for psychiatry consult during this admission if possible.     Rachel Sloan, PhD,   Health Psychology Fellow  [phone]

## 2021-09-20 NOTE — PROGRESS NOTES
Patient was concerned that her tube feeds were not being absorbed due to not having relizorb. Writer had some tubed from 7A. Writer changed out tube feed line and attached relizorb. TF paused from 0200 - 0250.

## 2021-09-20 NOTE — PLAN OF CARE
"/66 (BP Location: Right arm)   Pulse 84   Temp 98.4  F (36.9  C) (Axillary)   Resp 18   Ht 1.702 m (5' 7\")   Wt 59 kg (130 lb)   SpO2 98%   BMI 20.36 kg/m      A&O x4. VSS. On RA. C/O pain at GJ tube site. Gave oral and IV dilaudid x1. GT to LIS with 300ml of brown output. J tube clamped. Pt to start cycle TF tonight from 9pm-9am. On Clear liquid diet. Voiding in toilet. No BM. PIV SL. Up independently.  "

## 2021-09-20 NOTE — CONSULTS
Care Management Initial Consult    General Information  Assessment completed with: Patient, Dinora  Type of CM/SW Visit: Initial Assessment    Primary Care Provider verified and updated as needed: Yes   Readmission within the last 30 days: previous discharge plan unsuccessful   Return Category: Medically unsuccessful treatment plan  Reason for Consult: discharge planning  Advance Care Planning: Advance Care Planning Reviewed: present on chart        Communication Assessment  Patient's communication style: spoken language (English or Bilingual)    Hearing Difficulty or Deaf: no   Wear Glasses or Blind: yes    Cognitive  Cognitive/Neuro/Behavioral: WDL                      Living Environment:   People in home: spouse     Current living Arrangements: house      Able to return to prior arrangements: yes     Family/Social Support:  Care provided by: self  Provides care for: no one  Marital Status:     Norris       Description of Support System: Supportive, Involved    Support Assessment: Adequate family and caregiver support    Current Resources:   Patient receiving home care services: Yes  Skilled Home Care Services: Skilled Nursing ([facility]-set up through [facility]")  Community Resources: Home Infusion ([facility])  Equipment currently used at home: none  Supplies currently used at home:  (Tube feed supplies)    Employment/Financial:      Financial Concerns: No concerns identified     Lifestyle & Psychosocial Needs:  Social Determinants of Health     Tobacco Use: Medium Risk     Smoking Tobacco Use: Former Smoker     Smokeless Tobacco Use: Never Used   Alcohol Use:      Frequency of Alcohol Consumption:      Average Number of Drinks:      Frequency of Binge Drinking:    Financial Resource Strain:      Difficulty of Paying Living Expenses:    Food Insecurity:      Worried About Running Out of Food in the Last Year:      Ran Out of Food in the Last Year:    Transportation Needs:      Lack of  Transportation (Medical):      Lack of Transportation (Non-Medical):    Physical Activity:      Days of Exercise per Week:      Minutes of Exercise per Session:    Stress:      Feeling of Stress :    Social Connections:      Frequency of Communication with Friends and Family:      Frequency of Social Gatherings with Friends and Family:      Attends Church Services:      Active Member of Clubs or Organizations:      Attends Club or Organization Meetings:      Marital Status:    Intimate Partner Violence:      Fear of Current or Ex-Partner:      Emotionally Abused:      Physically Abused:      Sexually Abused:    Depression: Not at risk     PHQ-2 Score: 2   Housing Stability:      Unable to Pay for Housing in the Last Year:      Number of Places Lived in the Last Year:      Unstable Housing in the Last Year:      Functional Status:  Prior to admission patient needed assistance:   Dependent ADLs:: Independent  Dependent IADLs:: Independent     Mental Health Status:  No current concerns        Chemical Dependency Status:   No current concerns          Values/Beliefs:  Spiritual, Cultural Beliefs, Church Practices, Values that affect care: no             Additional Information:  Patient on obs unit but now with IP status.  Per transplant/GI provider notes:   Admitted 9/7-9/12/21 for PEG tube replacement by percutaneous GJ tube (9/7/21), complicated by displacement of the GJ tube, ultimately undergoing EGD with GI for replacement of PEG J-tube with T tag gastropexy on 9/10/21, presenting now with worsening pain at the enteric tube sites and some fullness in this area.  REC SHORT TERM:  1) Place G tube to low intermittent suction  2) Restart J tube feedings  3) Decrease IV fluids  4) Pain consult  OPTIONS LONG TERM:  1) Pain control, above interventions, and wait for pain to improve  2) If not improved change GJ to short G tube, loose, to allow tract to reduce inflammation  3) Remove GJ altogether, but would leave pt  back to square 1 minus a few steps, worse pain    Per notes today, GT is to suction,and feeds through J tube. Pain has seen and dietician is consulting for formula.    This [facility] called patient this AM to confirm prior services and to assess for new needs. Patient reports that she was referred to [facility] for tube feeds on last admit. [facility] was providing nursing visits   She reports that  nurse is the one who had her come in as things were not going well at home. She states that she believes they are changing her to a plant based formula as her diet was plant based prior to having tube feeds. Reports that she is tolerating 75 ml/hr today and the plan is to increase to 90/hr tonight. She is hoping she will be ready for discharge tomorrow. Her  will be able to transport home when she is ready for discharge.   [facility] resumption orders for enteral feeds have been placed.     Delon Mcgee RN Care Coordinator [phone]

## 2021-09-20 NOTE — CONSULTS
"Inpatient Pain Management Service Consultation Note  09/20/21        ADMIT DATE: 9/18/2021 * No surgery found *   DATE OF CONSULT: 09/20/21  Consult ordered by: Primary Care Team  Consult performed by: Mckenna Hamm MD  Visit/Communication style: In Person    REASON FOR PAIN CONSULTATION:  Dinora Mcghee is a 56 year old female I am seeing in consultation for evaluation and recommendations regarding \"Pain management\"     CHIEF PAIN COMPLAINT: abdominal pain at peg tube    ASSESSMENT: 57 yo female admitted to observation for intermittent severe pain at site of her PEG tube. She had an initial tube placed on 9/7 and then subsequently replaced by a second tube several days later due to coiling of original tube. She reports increased pain that she attributes to occurring during peristalsis of her intestine. At rest she states the pain is minimal, but becomes quite severe for several minutes at a time. These episodes were occurring quite frequently at home. However, have now decreased in frequency. She does note that she is currently constipated and believes that the decrease in gut motility is contributing to improvement in her pain symptoms. She has been taking Hydromorphone, which is likely contributing to her current constipation. She has also been taking cyclobenzaprine 10 mg TID and notes that this medication does help her pain symptoms.  SHe is concurrently taking amitriptyline 60 mg at bedtime. Both these agents have serotonin properties and therefore have to be careful at higher doses. However, the current dose is likely safe and appropriate to continue.     She is working with GI to determine her options of tube replacement or elimination.      -- PDMP reviewed. Requirements prior to admission; appropriate     Primary Care Provider: Latasha Paul      TREATMENT RECOMMENDATIONS/PLAN:   1. Consider use of topical lidocaine at tube insertion site on abdomen  2. Continue current Flexeril at 10 mg TID  3. " Continue Hydromorphone 2-4 mg Q3H PRN, would encourage to space out this interval to Q4H and then Q6H prn in order to improve gut motility. This will allow to determine if her symptoms have improved with increased gut motility.   4. Schedule Tylenol 975 mg Q8H  5. She has tried gabapentin and lyrica in the past without benefit, would not recommend starting these at this time  6. Continue Amitriptyline for migraine management. This is an excellent medication for chronic abdominal pain as well.     Pain Service will Sign Off at this time.     Recommendations were discussed with primary team    Thank you for consulting the Inpatient Pain Management Service.  The above recommendations are to be acted upon at the primary team s discretion.    To reach us:  Mon - Friday 8 AM - 3 PM: Pager [phone] (Text Page)  After hours, weekends and holidays: Primary service should call [phone] the answering service for the on-call pain specialist    HISTORY OF PRESENT ILLNESS: Dinora Mcghee is a 56 year old female with history of chronic abdominal pain, malnutrition due to gastroparesis and recent PEG placement and replacement. She presented yesterday to the ED for episodic severe abdominal pain that she believes was occurring at the time of peristalsis of her small intestine. Each episode of pain lasts approximately 2 minutes and was occurring up to every other hour. However, these episodes have been less frequent since being in the hospital. She attributes this to current constipation due to taking dilaudid. She is working with the GI team to determine what the next best step is for her current PEG tube.       PAIN HISTORY:   Onset: after her tube placement on 9/7/2021  Location: abdomen, epigastric region  Duration: 2 minutes approximately  Frequency: several times per day  Pain intensity: during episode 9/10  Quality characteristics: sharp, burning during episode  Aggravating factors: peristalsis  Relieving factors:  decreased gut motility      FUNCTIONAL STATUS:  Change:      improving  Oral intake:     NPO  Bowel function:    constipated  Activity level:     Up with assistance ambulating in room  Sleep:      Several hours of sleep  Mood:      stable    REVIEW OF 10 BODY SYSTEMS: 10 point ROS of systems including Constitutional, Eyes, Respiratory, Cardiovascular, Gastroenterology, Genitourinary, Integumentary, Musculoskeletal, Psychiatric were all negative except for pertinent positives noted in my HPI.     INPATIENT MEDICATIONS PERTINENT TO PAIN CONSULT:   Medications related to Pain Management (From now, onward)    Start     Dose/Rate Route Frequency Ordered Stop    09/19/21 2000  cyclobenzaprine (FLEXERIL) tablet 10 mg      10 mg Oral or Feeding Tube 3 TIMES DAILY 09/19/21 1604      09/19/21 1606  lidocaine (LMX4) cream       Topical 2 TIMES DAILY PRN 09/19/21 1606      09/19/21 1531  HYDROmorphone (DILAUDID) tablet 2-4 mg      2-4 mg Oral EVERY 3 HOURS PRN 09/19/21 1531      09/19/21 0808  HYDROmorphone (PF) (DILAUDID) injection 0.3 mg      0.3 mg Intravenous EVERY 4 HOURS PRN 09/19/21 0808      09/19/21 0800  polyethylene glycol (MIRALAX) Packet 17 g      17 g Oral EVERY MORNING 09/19/21 0122      09/19/21 0800  senna-docusate (SENOKOT-S/PERICOLACE) 8.6-50 MG per tablet 1-2 tablet      1-2 tablet Oral EVERY MORNING 09/19/21 0122      09/19/21 0609  diphenhydrAMINE (BENADRYL) capsule 25 mg      25 mg Oral EVERY 6 HOURS PRN 09/19/21 0609      09/19/21 0609  diphenhydrAMINE (BENADRYL) injection 25 mg      25 mg Intravenous EVERY 6 HOURS PRN 09/19/21 0609      09/19/21 0200  amitriptyline (ELAVIL) tablet 60 mg      60 mg Oral or Feeding Tube AT BEDTIME 09/19/21 0143            CURRENT MEDICATIONS:   Current Facility-Administered Medications Ordered in Epic   Medication Dose Route Frequency Provider Last Rate Last Admin     amitriptyline (ELAVIL) tablet 60 mg  60 mg Oral or Feeding Tube At Bedtime Orin Richard MD   60 mg at  09/19/21 2152     azelastine (ASTELIN) nasal spray 1 spray  1 spray Both Nostrils BID Daniel Colon MD         cetirizine (zyrTEC) tablet 10 mg  10 mg Oral or Feeding Tube QAM Maria Isabel Lama, PA   10 mg at 09/20/21 0828     cyclobenzaprine (FLEXERIL) tablet 10 mg  10 mg Oral or Feeding Tube TID Altagracia Costa APRN CNP   10 mg at 09/20/21 0828     dextrose 10% infusion   Intravenous Continuous PRN Altagracia Costa APRN CNP         glucose gel 15-30 g  15-30 g Oral Q15 Min PRN Maria Isabel Lama PA        Or     dextrose 50 % injection 25-50 mL  25-50 mL Intravenous Q15 Min PRN Maria Isabel Lama PA        Or     glucagon injection 1 mg  1 mg Subcutaneous Q15 Min PRN Maria Isabel Lama PA         diphenhydrAMINE (BENADRYL) capsule 25 mg  25 mg Oral Q6H PRN Maria Isabel Lama PA   25 mg at 09/20/21 0241    Or     diphenhydrAMINE (BENADRYL) injection 25 mg  25 mg Intravenous Q6H PRN Maria Isabel Lama PA   25 mg at 09/19/21 1637     famotidine (PEPCID) tablet 20 mg  20 mg Oral HERNANM Daniel Colon MD         HYDROmorphone (DILAUDID) tablet 2-4 mg  2-4 mg Oral Q3H PRN Altagracia Costa APRN CNP   4 mg at 09/20/21 0632     HYDROmorphone (PF) (DILAUDID) injection 0.3 mg  0.3 mg Intravenous Q4H PRN Altagracia Costa APRN CNP   0.3 mg at 09/19/21 0953     levothyroxine (SYNTHROID/LEVOTHROID) tablet 137 mcg  137 mcg Oral QAM AC Maria Isabel Lama PA   137 mcg at 09/20/21 0828     lidocaine (LMX4) cream   Topical BID PRN Altagracia Costa APRN CNP         melatonin tablet 1 mg  1 mg Oral At Bedtime PRN Maria Isabel Lama PA         montelukast (SINGULAIR) tablet 10 mg  10 mg Oral At Bedtime Daniel Colon MD         multivitamins w/minerals liquid 15 mL  15 mL Per Feeding Tube Daily Altagracia Costa APRN CNP   15 mL at 09/20/21 0832     pantoprazole (PROTONIX) IV push injection 40 mg  40 mg Intravenous BID Maria Isabel Lama PA   40 mg at 09/20/21 0829      polyethylene glycol (MIRALAX) Packet 17 g  17 g Oral QAM Maria Isabel Lama PA   17 g at 09/20/21 0829     prochlorperazine (COMPAZINE) injection 10 mg  10 mg Intravenous Q6H PRN Maria Isabel Lama PA   10 mg at 09/19/21 1638    Or     prochlorperazine (COMPAZINE) tablet 10 mg  10 mg Oral Q6H PRN Maria Isabel Lama PA   10 mg at 09/20/21 0717    Or     prochlorperazine (COMPAZINE) suppository 25 mg  25 mg Rectal Q12H PRN Maria Isabel Lama PA         promethazine (PHENERGAN) 12.5 mg in sodium chloride 0.9 % 50 mL intermittent infusion  12.5 mg Intravenous Q6H PRN Maria Isabel Lama PA   12.5 mg at 09/19/21 0327     promethazine (PHENERGAN) tablet 25 mg  25 mg Oral Q6H PRN Daniel Colon MD         Prucalopride Succinate TABS 2 mg  2 mg Oral Daniel Escamilla MD         senna-docusate (SENOKOT-S/PERICOLACE) 8.6-50 MG per tablet 1-2 tablet  1-2 tablet Oral Maria Isabel Porter PA   2 tablet at 09/20/21 0828     ZOLMitriptan (ZOMIG) tablet 5 mg  5 mg Oral BID PRN Daniel Colon MD         No current [facility]-ordered outpatient medications on file.       HOME/PREVIOUS MEDICATIONS:   Prior to Admission medications    Medication Sig Start Date End Date Taking? Authorizing Provider   amitriptyline (ELAVIL) 10 MG tablet Take 10 mg by mouth At Bedtime Take with a 50mg tab for a total of 60mg   Yes Reported, Patient   amitriptyline (ELAVIL) 50 MG tablet Take 1 tablet (50 mg) by mouth At Bedtime 9/17/21  Yes Latasha Paul APRN CNP   azelastine (ASTELIN) 0.1 % nasal spray Spray 1 spray into both nostrils 2 times daily 9/17/19  Yes Latasha Paul APRN CNP   cetirizine (ZYRTEC) 10 MG tablet Take 1 tablet (10 mg) by mouth daily  Patient taking differently: Take 10 mg by mouth every morning  8/26/20  Yes Latasha Paul APRN CNP   cyclobenzaprine (FLEXERIL) 5 MG tablet Take 1 tablet (5 mg) by mouth 3 times daily 9/12/21  Yes Rasheeda Saenz PA   diphenhydrAMINE (BENADRYL ALLERGY) 25  MG capsule Take 1 capsule (25 mg) by mouth every 6 hours as needed for itching or allergies 2/21/17  Yes Nestor Phoenix MD   estradiol (VAGIFEM) 10 MCG TABS vaginal tablet Place 1 tablet (10 mcg) vaginally twice a week 8/27/20  Yes Latasha Paul APRN CNP   famotidine (PEPCID) 20 MG tablet Take 1 tablet (20 mg) by mouth 2 times daily  Patient taking differently: Take 20 mg by mouth every morning  3/31/21  Yes Rosanne Marti PA-C   ibuprofen (ADVIL/MOTRIN) 400 MG tablet Take 1 tablet (400 mg) by mouth every 6 hours as needed for moderate pain 8/24/18  Yes Altagracia العلي PA-C   levothyroxine (SYNTHROID) 137 MCG tablet Take 1 tablet (137 mcg) by mouth daily  Patient taking differently: Take 137 mcg by mouth every morning  8/26/20  Yes Latasha Paul APRN CNP   montelukast (SINGULAIR) 10 MG tablet TAKE 1 TABLET(10 MG) BY MOUTH AT BEDTIME 9/16/21  Yes Latasha Paul APRN CNP   omeprazole (PRILOSEC) 40 MG DR capsule Take 1 capsule (40 mg) by mouth 2 times daily  Patient taking differently: Take 40 mg by mouth every evening  11/25/20  Yes Rosanne Marti PA-C   polyethylene glycol (MIRALAX) 17 GM/Dose powder Take 17 g (1 capful) by mouth daily  Patient taking differently: Take 1 capful by mouth every morning  7/14/21  Yes Rosanne Marti PA-C   prochlorperazine (COMPAZINE) 5 MG tablet Take 1 tablet (5 mg) by mouth every 6 hours as needed Take two 5 mg tablets by mouth every six hours as needed for nausea. 9/3/21  Yes Rosanne Marti PA-C   promethazine (PHENERGAN) 25 MG tablet Take 1 tablet (25 mg) by mouth daily as needed 9/3/21  Yes Rosanne Marti PA-C   Prucalopride Succinate (MOTEGRITY) 2 MG TABS Take 2 mg by mouth daily  Patient taking differently: Take 2 mg by mouth every morning  5/21/21  Yes Rosanne Marti PA-C   scopolamine (TRANSDERM) 1 MG/3DAYS 72 hr patch Place 1 patch onto the skin every 72 hours 7/14/21  Yes Rosanne Marti  MORENO Arreguin   senna-docusate (SENOKOT-S;PERICOLACE) 8.6-50 MG per tablet Take 1-2 tablets by mouth 2 times daily  Patient taking differently: Take 1-2 tablets by mouth every morning  8/24/18  Yes Altagracia العلي PA-C   ZOLMitriptan (ZOMIG) 5 MG tablet Take 1 tablet (5 mg) by mouth at onset of headache for migraine 9/16/21  Yes Latasha Paul APRN CNP   blood glucose (PAT CONTOUR NEXT) test strip Use to test blood sugar 6 times daily or as directed. 11/30/20   Gloria Melissa MD   blood glucose monitoring (PAT MICROLET) lancets Use to test blood sugar 6-8 times daily or as directed. 7/22/19   Gloria Melissa MD   Continuous Blood Gluc Sensor (FREESTYLE LISA 2 SENSOR) MISC 1 each every 14 days 4/1/21   Gloria Melissa MD   glucose 40 % GEL gel Take 15-30 g by mouth every 15 minutes as needed for low blood sugar 1/6/17   Melissa Sood PA-C   order for DME Equipment being ordered:[employer] 2/21/18   Ruiz Canchola MD   Sharps Container (BD SHARPS ) MISC 1 Container as needed 1/6/17   Melissa Sood PA-C       ALLERGIES:    Allergies   Allergen Reactions     Apple Anaphylaxis     Corticosteroids Other (See Comments)     All oral, IV and injectable steroids are contraindicated (unless in life threatening situations) in [facility] [employer] transplant recipients. They can cause irreversible loss of islet cell function. Please contact patient's transplant care coordinator ADI Gaffney RN at [phone]/pager [phone] and/or endocrinologist prior to administration.       Depakote [Divalproex Sodium] Other (See Comments)     Chest pain     Zithromax [Azithromycin Dihydrate] Anaphylaxis     Noted in 4/7/08 ER     Darvocet [Propoxyphene N-Apap] Itching     Morphine Nausea and Vomiting and Rash     Nalbuphine Hcl Rash     RASH :nubaine     Zosyn [Piperacillin-Tazobactam In D5w] Rash     Possible allergy, did have a diffuse rash that seemed drug related but could have also been  related to soap in the hospital.      Bactrim [Sulfamethoxazole W-Trimethoprim] Other (See Comments) and Nausea and Vomiting     Severely low liver function.     Cats      Compazine [Prochlorperazine] Other (See Comments)     Twitching. Takes Benedryl and is fine     Dust Mites      Grass      Ragweeds      Tape [Adhesive Tape] Blisters     Tramadol      Trees      Zofran [Ondansetron] Other (See Comments)     migraine     Flagyl [Metronidazole] Hives and Rash        PAST MEDICAL AND PSYCHIATRIC HISTORY:    Past Medical History:   Diagnosis Date     Allergic rhinitis, cause unspecified      Allergy to other foods     strawberries, apples, celeries, alice, watermelon     Arthritis     left knee     Choledocholithiasis     long after cholecystectomy     Chronic abdominal pain      Chronic constipation      Chronic nausea      Chronic pancreatitis (H)      Degeneration of lumbar or lumbosacral intervertebral disc      Esophageal reflux     w/ hiatal hernia     Gastroparesis      Hiatal hernia      History of pituitary adenoma     s/p resection     Hypothyroidism      Migraines      Mild hyperlipidemia      On tube feeding diet     presence of GJ tube     Pancreatic disease     PD stricture, 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 endometriosis growth     APPENDECTOMY       C  DELIVERY ONLY       C  DELIVERY ONLY      repeat c section with incidental cystotomy with repair     C EXCIS PITUITARY,TRANSNASAL/SEPTAL      pituitary tumor removed for diabetes insipidus     C TOTAL ABDOM HYSTERECTOMY      w/ bilateral salpingoophorectomy       SECTION       COLONOSCOPY       ENDOSCOPIC RETROGRADE  CHOLANGIOPANCREATOGRAM N/A 6/2/2015    Procedure: ENDOSCOPIC RETROGRADE CHOLANGIOPANCREATOGRAM;  Surgeon: Mandeep Park MD;  Location: [facility]     ENDOSCOPIC RETROGRADE CHOLANGIOPANCREATOGRAM N/A 2/9/2016    Procedure: COMBINED ENDOSCOPIC RETROGRADE CHOLANGIOPANCREATOGRAPHY, PLACE TUBE/STENT;  Surgeon: Mandeep Park MD;  Location: [facility]     ENDOSCOPIC RETROGRADE CHOLANGIOPANCREATOGRAM N/A 3/17/2016    Procedure: COMBINED ENDOSCOPIC RETROGRADE CHOLANGIOPANCREATOGRAPHY, REMOVE FOREIGN BODY OR STENT/TUBE;  Surgeon: Mandeep Park MD;  Location: [facility]     ENDOSCOPIC RETROGRADE CHOLANGIOPANCREATOGRAM N/A 8/2/2016    Procedure: COMBINED ENDOSCOPIC RETROGRADE CHOLANGIOPANCREATOGRAPHY, PLACE TUBE/STENT;  Surgeon: Mandeep Park MD;  Location: [facility]     ENDOSCOPIC RETROGRADE CHOLANGIOPANCREATOGRAM N/A 8/26/2016    Procedure: COMBINED ENDOSCOPIC RETROGRADE CHOLANGIOPANCREATOGRAPHY, REMOVE FOREIGN BODY OR STENT/TUBE;  Surgeon: Mandeep Park MD;  Location: [facility]     ENDOSCOPIC ULTRASOUND UPPER GASTROINTESTINAL TRACT (GI) N/A 10/3/2016    Procedure: ENDOSCOPIC ULTRASOUND, ESOPHAGOSCOPY / UPPER GASTROINTESTINAL TRACT (GI);  Surgeon: Guru Jose Rodas MD;  Location: [facility]     ESOPHAGOSCOPY, GASTROSCOPY, DUODENOSCOPY (EGD), COMBINED N/A 6/24/2015    Procedure: COMBINED ESOPHAGOSCOPY, GASTROSCOPY, DUODENOSCOPY (EGD), REMOVE FOREIGN BODY;  Surgeon: Mandeep Park MD;  Location: [facility]     ESOPHAGOSCOPY, GASTROSCOPY, DUODENOSCOPY (EGD), COMBINED N/A 10/25/2015    Procedure: COMBINED ESOPHAGOSCOPY, GASTROSCOPY, DUODENOSCOPY (EGD);  Surgeon: Sammy Amaro MD;  Location: [facility]     ESOPHAGOSCOPY, GASTROSCOPY, DUODENOSCOPY (EGD), COMBINED N/A 10/25/2015    Procedure: COMBINED ESOPHAGOSCOPY, GASTROSCOPY, DUODENOSCOPY (EGD), BIOPSY SINGLE OR MULTIPLE;  Surgeon: Sammy Amaro MD;  Location: [facility]     ESOPHAGOSCOPY, GASTROSCOPY, DUODENOSCOPY (EGD), DILATATION,  COMBINED       EXCISE LESION TRUNK N/A 4/17/2017    Procedure: EXCISE LESION TRUNK;  Removal of Abdominal Foreign Body;  Surgeon: Nestor Phoenix MD;  [facility]     HC ESOPH/GAS REFLUX TEST W NASAL IMPED >1 HR N/A 11/19/2015    Procedure: ESOPHAGEAL IMPEDENCE FUNCTION TEST WITH 24 HOUR PH GREATER THAN 1 HOUR;  Surgeon: Thiago Apple MD;  Location: [facility]     HC UGI ENDOSCOPY DIAG W BIOPSY  9/17/08     HC UGI ENDOSCOPY DIAG W BIOPSY  9/27/12     HC UGI ENDOSCOPY W ESOPHAGEAL DILATION BALLOON <30MM  9/17/08     HC UGI ENDOSCOPY W EUS N/A 5/5/2015    Procedure: COMBINED ENDOSCOPIC ULTRASOUND, ESOPHAGOSCOPY, GASTROSCOPY, DUODENOSCOPY (EGD);  Surgeon: Wm Dueñas MD;  Location: [facility]     HC WRIST ARTHROSCOP,RELEASE XVERS LIG Bilateral 12/17/08     INJECT TRANSVERSUS ABDOMINIS PLANE (TAP) BLOCK BILATERAL Left 9/22/2016    Procedure: INJECT TRANSVERSUS ABDOMINIS PLANE (TAP) BLOCK BILATERAL;  Surgeon: Dickson Corrigan MD;  [facility]     laparoscopic pineda  1995     LAPAROSCOPIC HERNIORRHAPHY INCISIONAL N/A 8/23/2018    Procedure: LAPAROSCOPIC HERNIORRHAPHY INCISIONAL;  Laparoscopic Incisional Hernia Repair with Symbotex Mesh Implant;  Surgeon: Nestor Phoenix MD;  Location: [facility]     LAPAROSCOPIC PANCREATECTOMY, TRANSPLANT AUTO ISLET CELL N/A 12/28/2016    Procedure: LAPAROSCOPIC PANCREATECTOMY, TRANSPLANT AUTO ISLET CELL;  Surgeon: Nestor Phoenix MD;  Location: [facility]     REPLACE GASTROJEJUNOSTOMY TUBE, PERCUTANEOUS N/A 9/7/2021    Procedure: GASTROJEJUNOSTOMY TUBE PLACEMENT, PERCUTANEOUS, WITH GASTROPEXY;  Surgeon: Mandeep Park MD;  Location: [facility]     REPLACE JEJUNOSTOMY TUBE, PERCUTANEOUS N/A 9/10/2021    Procedure: UPPER ENDOSCOPY, REPLACEMENT OF PERCUTANEOUS GASTROJEJUNOSTOMY TUBE, T-TAG GASTROPEXY;  Surgeon: Zackery Montoya MD;  Location: [facility]     transphenoidal pituitary resection  1990       FAMILY HISTORY: family history includes Anesthesia Reaction in her father;  Angina in her mother; Anxiety Disorder in her nephew and son; Cataracts in her father; Cerebrovascular Disease in her father; Coronary Artery Disease in her sister; Depression in her father, mother, nephew, sister, and son; Diabetes in her maternal grandfather, paternal aunt, paternal grandfather, and paternal uncle; Diabetes Type 2  in her cousin; Eye Disorder in her father, maternal grandmother, paternal grandfather, paternal grandmother, and son; GERD in her mother and sister; Heart Disease in her paternal aunt and paternal uncle; Hypertension in her mother; Lipids in her father and mother; Myocardial Infarction (age of onset: 60) in her father; Osteoarthritis in her father; Skin Cancer in her mother; Substance Abuse in her father and sister; Thyroid Disease in her maternal grandmother, mother, nephew, and sister; Ulcerative Colitis in her father.    HEALTH & LIFESTYLE PRACTICES:   Tobacco:  reports that she quit smoking about 29 years ago. Her smoking use included cigarettes. She started smoking about 36 years ago. She has a 3.00 pack-year smoking history. She has never used smokeless tobacco.  Alcohol:  reports previous alcohol use of about 3.0 - 6.0 standard drinks of alcohol per week.  Illicit drugs:  reports no history of drug use.        LABORATORY VALUES:   Last Basic Metabolic Panel:  Lab Results   Component Value Date     09/18/2021     12/17/2020      Lab Results   Component Value Date    POTASSIUM 3.9 09/18/2021    POTASSIUM 3.5 12/17/2020     Lab Results   Component Value Date    CHLORIDE 103 09/18/2021    CHLORIDE 106 12/17/2020     Lab Results   Component Value Date    KASSI 9.2 09/18/2021    KASSI 9.1 12/17/2020     Lab Results   Component Value Date    CO2 30 09/18/2021    CO2 32 12/17/2020     Lab Results   Component Value Date    BUN 13 09/18/2021    BUN 9 12/17/2020     Lab Results   Component Value Date    CR 0.73 09/18/2021    CR 0.78 12/17/2020     Lab Results   Component Value Date  "    09/20/2021    GLC 95 09/18/2021     12/17/2020       CBC results:  Lab Results   Component Value Date    WBC 5.4 09/18/2021    WBC 5.0 12/17/2020     Lab Results   Component Value Date    HGB 13.6 09/18/2021    HGB 13.5 12/17/2020     Lab Results   Component Value Date    HCT 39.8 09/18/2021    HCT 41.4 12/17/2020     Lab Results   Component Value Date     09/18/2021     12/17/2020       DIAGNOSTIC TESTS:     Labs above reviewed as well as additional relevant diagnostic studies from the EPIC record.     PHYSICAL EXAMINATION:  /76 (BP Location: Right arm)   Pulse 98   Temp 98.9  F (37.2  C) (Oral)   Resp 18   Ht 1.702 m (5' 7\")   Wt 59 kg (130 lb)   SpO2 98%   BMI 20.36 kg/m       EXAM:  Constitutional: healthy, alert and no distress   Cardiovascular: negative  Respiratory: negative  Psychiatric: mentation appears normal  Abdomen: Abdomen soft, TTP at tube insertion site, positive BS in 4 quadrants      TIME SPENT: 60 minutes including more than 50% of face-to-face time counseling her  about her diagnosis and treatment options, and coordinating care with the primary team.  DECISION-MAKING: The level of decision-making in this case is high/complex due to the complexity of medical problems, acute/chronic pain, opioid analgesia issues, and behavioral factors.    Mckenna Hamm MD  September 20, 2021, 12:09 PM  Inpatient Pain Management Service            "

## 2021-09-20 NOTE — PLAN OF CARE
Patient alert and oriented, received GT feeding at 75ml/hr, till 11am tolerated well. Has g tube to suction, and continues on clear liquids. Has audible bowel sounds.

## 2021-09-20 NOTE — PROGRESS NOTES
GASTROENTEROLOGY PROGRESS NOTE    Date: 09/20/2021     ASSESSMENT:  Dinora Mcghee is a 56 year old female with a history of hypothyroidism, GERD, allergic rhinitis, migraines, hiatal hernia, lumbar DDD, pituitary adenoma s/p resection, IgG4 pseudotumor of liver, chronic pancreatitis s/p TPAIT (2016), gastroparesis, FTT, admitted 9/7-9/12/21 for PEG tube replacement by percutaneous GJ tube (9/7/21), complicated by displacement of the GJ tube, underwent PEG J-tube revision with T tag gastropexy on 9/10/21, presenting with 3 days of pain at the enteric tube sites and some fullness in the PEGJ tube insertion site.     As per Dr. Park's note  - Pt had same intolerance of previous GJ pre-TPIAT, and post TPIAT, therefore may be a visceral intolerance BUT clearly has acute/semi acute inflammation around PEG tube tract, that may or may not resolve.   - Spasm pain probably due to jejunal peristalsis pulling on G tube and impacting bumper in abd wall.  - Has gastroparesis so gastric distention probably playing a role     RECOMMENDATIONS:  -- Pain consult  -- Please increase milarax to BID for constipation related to opioid  -- Please have patient use her home Motegrity for gastroparesis  -- Further recs to follow, pending patient discussion with Dr. Park.    Gastroenterology follow up recommendations: Pending clinical course.      Thank you for involving us in this patient's care. Please do not hesitate to contact the GI service with any questions or concerns.      Pt care plan discussed with Dr. Park, GI staff physician.    Maren Flores MD  Gastroenterology Fellow  Division of Gastroenterology, Hepatology and Nutrition  [facility]  Page [phone]  _______________________________________________________________    Subjective: No bowel movement for the past 2 days, is taking daily miralax. Passing gas. Still having pain around the PEGJ tube, requiring po hydromorphone.     Objective:  Blood  "pressure (P) 96/58, pulse (P) 94, temperature (P) 98.2  F (36.8  C), temperature source (P) Oral, resp. rate (P) 16, height 1.702 m (5' 7\"), weight 59 kg (130 lb), SpO2 (P) 99 %, not currently breastfeeding.    Gen: A&Ox3, NAD  HEENT: sclera anicteric  CV: RRR  Lungs: breathing comfortably on room air  Abd: Non-distended, +bs, no hepatosplenomegaly, Tender around PEGJ tube area, not tender on other area, no peritoneal signs, PEGJ tube with surrounding erythema, non-exudative.   Skin: no jaundice, no stigmata of chronic liver disease  MS: appropriate muscle mass for age  Neuro: non focal       LABS:  BMP  Recent Labs   Lab 09/20/21  0159 09/19/21  2159 09/19/21  1631 09/19/21  1429 09/19/21  0202 09/18/21  1717   NA  --   --   --   --   --  137   POTASSIUM  --   --   --   --   --  3.9   CHLORIDE  --   --   --   --   --  103   KASSI  --   --   --   --   --  9.2   CO2  --   --   --   --   --  30   BUN  --   --   --   --   --  13   CR  --   --   --   --   --  0.73   * 118* 89 78   < > 95    < > = values in this interval not displayed.     CBC  Recent Labs   Lab 09/18/21  1717 09/16/21  1308 09/16/21  1308   WBC 5.4  --  6.5   RBC 4.29  --  4.15   HGB 13.6   < > 13.0   HCT 39.8  --  39.7   MCV 93  --  96   MCH 31.7  --  31.3   MCHC 34.2  --  32.7   RDW 13.3  --  13.5     --  387    < > = values in this interval not displayed.     INRNo lab results found in last 7 days.  LFTs  Recent Labs   Lab 09/18/21 1717   ALKPHOS 133   AST 29   ALT 77*   BILITOTAL 0.3   PROTTOTAL 7.7   ALBUMIN 3.8      PANC  Recent Labs   Lab 09/18/21  1936   LIPASE 12*       IMAGING:  No new in 24 hr.    "

## 2021-09-20 NOTE — PLAN OF CARE
"/64 (BP Location: Right arm)   Pulse 72   Temp 98.1  F (36.7  C) (Oral)   Resp 16   Ht 1.702 m (5' 7\")   Wt 59 kg (130 lb)   SpO2 99%   BMI 20.36 kg/m      Time: 4090-4439  R. of admission/Status:admitted on 09/18/21 due to worsening pain at PEG tube site, see MD note for detail hx.   Neuro:  A&Ox4  Activity: up independent   Pain: c/o headache, Zomig 5 mg for migraine headache once helped per pt report.   Cardiac: soft BP, denied chest pain, afebrile.   Respiratory: room air sating > 95%, denied SOB or coughing, no respiratory distress noted.   GI/: no bm this shift, active bowel sound. Voiding spontaneously without difficult.   Diet: clear liquid diet, tube feeding started at 2100 at 35 ml/hr, increase 10 ml q 2 hours, goal for the night 75 ml/hr until 9:00 am. Then if she tolerates, cycled tube feeding at goal rate 90 ml/hr. C/o nausea, Compazine and Benadryl helped.   Skin: no skin deficit.   LDAs: PEG tube J tube for feeding and G tube LIS  Labs/Imaging: UA sent, BS q 4 hours.   New change this shift: Admitted in patient, transfer order to the floor.   Plan: pain management, nausea control using compazine and Benadryl. Tube feeding rate increase as tolerated.      "

## 2021-09-20 NOTE — PROGRESS NOTES
Writer went to place relizorb cartridge and found that the tubing was clogged. Replaced kangaroo tubing.

## 2021-09-20 NOTE — PLAN OF CARE
"Shift: 23:30 - 07:30  VS: BP 99/58 (BP Location: Right arm)   Pulse 85   Temp 98.5  F (36.9  C) (Oral)   Resp 16   Ht 1.702 m (5' 7\")   Wt 59 kg (130 lb)   SpO2 99%   BMI 20.36 kg/m    Pain: Patient c/o pain 7/10 in her abd after tube irritation from moving.  Neuro: AOx4, able to make needs known.  Cardiac: WDL, no CP noted.  Respiratory: No SoB noted.  Diet/Appetite:  Clear liquids  /GI: Patient has GJtube infusing TF at this time.  LDA's: PIV removed @ 0200. Vascular ordered for another IV.  Skin: No new deficits noted.  Activity: Independent.  "

## 2021-09-20 NOTE — PROVIDER NOTIFICATION
Patient asking if she can take Motegrity 2mg med. It was not reordered from home meds but she has her home med. Also requesting extra dose of Zolmitriptan for her headache. Page out to MD.

## 2021-09-20 NOTE — PROGRESS NOTES
CLINICAL NUTRITION SERVICES - BRIEF NOTE     Nutrition Prescription      Recommendations already ordered by Registered Dietitian (RD):  Recommend continuing TF with plan for 75 ml/hr x 14 hours (9p-11a).  If pt remains inpatient overnight, recommend attempting 90 ml/hr x 12 hours (9p-9a).      Relizorb cartridge changed q 7 hours with 14 hour cycle and q 6 hours with 12 hour cycle.        EVALUATION OF THE PROGRESS TOWARD GOALS        NEW FINDINGS   Per bedside RN, TF at 75 ml/hr with fair tolerance.  Discussed above recommendations/orders with RN.     Monitoring/Evaluation  Progress toward goals will be monitored and evaluated per protocol.     Antoinette Larios MS, RD, LD  Pager [phone]

## 2021-09-21 LAB
ALBUMIN SERPL-MCNC: 3.1 G/DL (ref 3.4–5)
ALP SERPL-CCNC: 107 U/L (ref 40–150)
ALT SERPL W P-5'-P-CCNC: 66 U/L (ref 0–50)
ANION GAP SERPL CALCULATED.3IONS-SCNC: 4 MMOL/L (ref 3–14)
AST SERPL W P-5'-P-CCNC: 38 U/L (ref 0–45)
ATRIAL RATE - MUSE: 84 BPM
BACTERIA UR CULT: NORMAL
BASOPHILS # BLD AUTO: 0.1 10E3/UL (ref 0–0.2)
BASOPHILS NFR BLD AUTO: 1 %
BILIRUB SERPL-MCNC: 0.5 MG/DL (ref 0.2–1.3)
BUN SERPL-MCNC: 10 MG/DL (ref 7–30)
CALCIUM SERPL-MCNC: 9 MG/DL (ref 8.5–10.1)
CHLORIDE BLD-SCNC: 103 MMOL/L (ref 94–109)
CO2 SERPL-SCNC: 31 MMOL/L (ref 20–32)
CREAT SERPL-MCNC: 0.78 MG/DL (ref 0.52–1.04)
DIASTOLIC BLOOD PRESSURE - MUSE: NORMAL MMHG
EOSINOPHIL # BLD AUTO: 0.3 10E3/UL (ref 0–0.7)
EOSINOPHIL NFR BLD AUTO: 7 %
ERYTHROCYTE [DISTWIDTH] IN BLOOD BY AUTOMATED COUNT: 13.5 % (ref 10–15)
GFR SERPL CREATININE-BSD FRML MDRD: 86 ML/MIN/1.73M2
GLUCOSE BLD-MCNC: 91 MG/DL (ref 70–99)
GLUCOSE BLDC GLUCOMTR-MCNC: 90 MG/DL (ref 70–99)
GLUCOSE BLDC GLUCOMTR-MCNC: 91 MG/DL (ref 70–99)
GLUCOSE BLDC GLUCOMTR-MCNC: 95 MG/DL (ref 70–99)
HCT VFR BLD AUTO: 38.4 % (ref 35–47)
HGB BLD-MCNC: 12.5 G/DL (ref 11.7–15.7)
IMM GRANULOCYTES # BLD: 0 10E3/UL
IMM GRANULOCYTES NFR BLD: 0 %
INTERPRETATION ECG - MUSE: NORMAL
LYMPHOCYTES # BLD AUTO: 2.4 10E3/UL (ref 0.8–5.3)
LYMPHOCYTES NFR BLD AUTO: 48 %
MAGNESIUM SERPL-MCNC: 2.1 MG/DL (ref 1.6–2.3)
MCH RBC QN AUTO: 31.3 PG (ref 26.5–33)
MCHC RBC AUTO-ENTMCNC: 32.6 G/DL (ref 31.5–36.5)
MCV RBC AUTO: 96 FL (ref 78–100)
MONOCYTES # BLD AUTO: 0.9 10E3/UL (ref 0–1.3)
MONOCYTES NFR BLD AUTO: 18 %
NEUTROPHILS # BLD AUTO: 1.3 10E3/UL (ref 1.6–8.3)
NEUTROPHILS NFR BLD AUTO: 26 %
NRBC # BLD AUTO: 0 10E3/UL
NRBC BLD AUTO-RTO: 0 /100
P AXIS - MUSE: 108 DEGREES
PHOSPHATE SERPL-MCNC: 4.2 MG/DL (ref 2.5–4.5)
PLATELET # BLD AUTO: 419 10E3/UL (ref 150–450)
POTASSIUM BLD-SCNC: 3.9 MMOL/L (ref 3.4–5.3)
PR INTERVAL - MUSE: 140 MS
PROT SERPL-MCNC: 6.5 G/DL (ref 6.8–8.8)
QRS DURATION - MUSE: 76 MS
QT - MUSE: 380 MS
QTC - MUSE: 449 MS
R AXIS - MUSE: 5 DEGREES
RBC # BLD AUTO: 3.99 10E6/UL (ref 3.8–5.2)
SODIUM SERPL-SCNC: 138 MMOL/L (ref 133–144)
SYSTOLIC BLOOD PRESSURE - MUSE: NORMAL MMHG
T AXIS - MUSE: 34 DEGREES
VENTRICULAR RATE- MUSE: 84 BPM
WBC # BLD AUTO: 4.9 10E3/UL (ref 4–11)

## 2021-09-21 PROCEDURE — 99252 IP/OBS CONSLTJ NEW/EST SF 35: CPT | Performed by: PSYCHIATRY & NEUROLOGY

## 2021-09-21 PROCEDURE — 250N000011 HC RX IP 250 OP 636: Performed by: NURSE PRACTITIONER

## 2021-09-21 PROCEDURE — 99233 SBSQ HOSP IP/OBS HIGH 50: CPT | Performed by: INTERNAL MEDICINE

## 2021-09-21 PROCEDURE — 82040 ASSAY OF SERUM ALBUMIN: CPT | Performed by: INTERNAL MEDICINE

## 2021-09-21 PROCEDURE — 84100 ASSAY OF PHOSPHORUS: CPT | Performed by: INTERNAL MEDICINE

## 2021-09-21 PROCEDURE — 250N000011 HC RX IP 250 OP 636: Performed by: PHYSICIAN ASSISTANT

## 2021-09-21 PROCEDURE — 250N000013 HC RX MED GY IP 250 OP 250 PS 637: Performed by: NURSE PRACTITIONER

## 2021-09-21 PROCEDURE — 85025 COMPLETE CBC W/AUTO DIFF WBC: CPT | Performed by: INTERNAL MEDICINE

## 2021-09-21 PROCEDURE — 36415 COLL VENOUS BLD VENIPUNCTURE: CPT | Performed by: INTERNAL MEDICINE

## 2021-09-21 PROCEDURE — 258N000003 HC RX IP 258 OP 636: Performed by: INTERNAL MEDICINE

## 2021-09-21 PROCEDURE — 250N000013 HC RX MED GY IP 250 OP 250 PS 637: Performed by: PHYSICIAN ASSISTANT

## 2021-09-21 PROCEDURE — 250N000013 HC RX MED GY IP 250 OP 250 PS 637: Performed by: INTERNAL MEDICINE

## 2021-09-21 PROCEDURE — 250N000013 HC RX MED GY IP 250 OP 250 PS 637: Performed by: EMERGENCY MEDICINE

## 2021-09-21 PROCEDURE — C9113 INJ PANTOPRAZOLE SODIUM, VIA: HCPCS | Performed by: PHYSICIAN ASSISTANT

## 2021-09-21 PROCEDURE — 258N000003 HC RX IP 258 OP 636: Performed by: PHYSICIAN ASSISTANT

## 2021-09-21 PROCEDURE — G0463 HOSPITAL OUTPT CLINIC VISIT: HCPCS

## 2021-09-21 PROCEDURE — 120N000002 HC R&B MED SURG/OB UMMC

## 2021-09-21 PROCEDURE — 83735 ASSAY OF MAGNESIUM: CPT | Performed by: INTERNAL MEDICINE

## 2021-09-21 PROCEDURE — 250N000009 HC RX 250: Performed by: INTERNAL MEDICINE

## 2021-09-21 RX ORDER — SODIUM BICARBONATE 325 MG/1
325 TABLET ORAL ONCE
Status: COMPLETED | OUTPATIENT
Start: 2021-09-21 | End: 2021-09-21

## 2021-09-21 RX ORDER — SODIUM CHLORIDE, SODIUM LACTATE, POTASSIUM CHLORIDE, CALCIUM CHLORIDE 600; 310; 30; 20 MG/100ML; MG/100ML; MG/100ML; MG/100ML
INJECTION, SOLUTION INTRAVENOUS CONTINUOUS
Status: DISCONTINUED | OUTPATIENT
Start: 2021-09-21 | End: 2021-09-22

## 2021-09-21 RX ORDER — MIRTAZAPINE 30 MG/1
30 TABLET, ORALLY DISINTEGRATING ORAL AT BEDTIME
Status: DISCONTINUED | OUTPATIENT
Start: 2021-09-21 | End: 2021-09-24 | Stop reason: HOSPADM

## 2021-09-21 RX ORDER — POLYETHYLENE GLYCOL 3350 17 G/17G
17 POWDER, FOR SOLUTION ORAL 3 TIMES DAILY
Status: DISCONTINUED | OUTPATIENT
Start: 2021-09-21 | End: 2021-09-24 | Stop reason: HOSPADM

## 2021-09-21 RX ADMIN — PROMETHAZINE HYDROCHLORIDE 12.5 MG: 25 INJECTION INTRAMUSCULAR; INTRAVENOUS at 17:06

## 2021-09-21 RX ADMIN — MIRTAZAPINE 30 MG: 30 TABLET, ORALLY DISINTEGRATING ORAL at 21:27

## 2021-09-21 RX ADMIN — HYDROMORPHONE HYDROCHLORIDE 0.3 MG: 1 INJECTION, SOLUTION INTRAMUSCULAR; INTRAVENOUS; SUBCUTANEOUS at 08:18

## 2021-09-21 RX ADMIN — PANCRELIPASE 1 CAPSULE: 24000; 76000; 120000 CAPSULE, DELAYED RELEASE PELLETS ORAL at 09:25

## 2021-09-21 RX ADMIN — ACETAMINOPHEN 975 MG: 325 TABLET, FILM COATED ORAL at 05:06

## 2021-09-21 RX ADMIN — HYDROMORPHONE HYDROCHLORIDE 4 MG: 2 TABLET ORAL at 16:01

## 2021-09-21 RX ADMIN — ACETAMINOPHEN 975 MG: 325 TABLET, FILM COATED ORAL at 21:27

## 2021-09-21 RX ADMIN — PANTOPRAZOLE SODIUM 40 MG: 40 INJECTION, POWDER, FOR SOLUTION INTRAVENOUS at 21:26

## 2021-09-21 RX ADMIN — LIDOCAINE: 40 CREAM TOPICAL at 09:25

## 2021-09-21 RX ADMIN — LEVOTHYROXINE SODIUM 137 MCG: 0.14 TABLET ORAL at 08:01

## 2021-09-21 RX ADMIN — HYDROMORPHONE HYDROCHLORIDE 2 MG: 2 TABLET ORAL at 12:41

## 2021-09-21 RX ADMIN — FAMOTIDINE 20 MG: 20 TABLET, FILM COATED ORAL at 08:01

## 2021-09-21 RX ADMIN — HYDROMORPHONE HYDROCHLORIDE 2 MG: 2 TABLET ORAL at 03:20

## 2021-09-21 RX ADMIN — SODIUM CHLORIDE, POTASSIUM CHLORIDE, SODIUM LACTATE AND CALCIUM CHLORIDE: 600; 310; 30; 20 INJECTION, SOLUTION INTRAVENOUS at 17:48

## 2021-09-21 RX ADMIN — LIDOCAINE: 40 CREAM TOPICAL at 21:27

## 2021-09-21 RX ADMIN — PANCRELIPASE 1 CAPSULE: 24000; 76000; 120000 CAPSULE, DELAYED RELEASE PELLETS ORAL at 10:10

## 2021-09-21 RX ADMIN — POLYETHYLENE GLYCOL 3350 17 G: 17 POWDER, FOR SOLUTION ORAL at 21:31

## 2021-09-21 RX ADMIN — DOCUSATE SODIUM 50 MG AND SENNOSIDES 8.6 MG 2 TABLET: 8.6; 5 TABLET, FILM COATED ORAL at 08:01

## 2021-09-21 RX ADMIN — POLYETHYLENE GLYCOL 3350 17 G: 17 POWDER, FOR SOLUTION ORAL at 08:01

## 2021-09-21 RX ADMIN — CETIRIZINE HYDROCHLORIDE 10 MG: 10 TABLET, FILM COATED ORAL at 08:01

## 2021-09-21 RX ADMIN — ACETAMINOPHEN 975 MG: 325 TABLET, FILM COATED ORAL at 13:28

## 2021-09-21 RX ADMIN — HYDROMORPHONE HYDROCHLORIDE 4 MG: 2 TABLET ORAL at 21:02

## 2021-09-21 RX ADMIN — CYCLOBENZAPRINE HYDROCHLORIDE 10 MG: 5 TABLET, FILM COATED ORAL at 13:28

## 2021-09-21 RX ADMIN — PANTOPRAZOLE SODIUM 40 MG: 40 INJECTION, POWDER, FOR SOLUTION INTRAVENOUS at 08:01

## 2021-09-21 RX ADMIN — CYCLOBENZAPRINE HYDROCHLORIDE 10 MG: 5 TABLET, FILM COATED ORAL at 08:01

## 2021-09-21 RX ADMIN — CYCLOBENZAPRINE HYDROCHLORIDE 10 MG: 5 TABLET, FILM COATED ORAL at 21:27

## 2021-09-21 RX ADMIN — MONTELUKAST 10 MG: 10 TABLET, FILM COATED ORAL at 21:27

## 2021-09-21 RX ADMIN — SODIUM BICARBONATE 325 MG: 325 TABLET ORAL at 09:25

## 2021-09-21 RX ADMIN — Medication 15 ML: at 08:02

## 2021-09-21 RX ADMIN — AMITRIPTYLINE HYDROCHLORIDE 60 MG: 10 TABLET, FILM COATED ORAL at 21:27

## 2021-09-21 RX ADMIN — SODIUM BICARBONATE 325 MG: 325 TABLET ORAL at 10:10

## 2021-09-21 ASSESSMENT — ACTIVITIES OF DAILY LIVING (ADL)
ADLS_ACUITY_SCORE: 6

## 2021-09-21 NOTE — PROGRESS NOTES
[facility]    Hospitalist Progress Note      Assessment & Plan   56-year-old female patient presented to the hospital with abdominal pain, nausea, and vomiting.  The patient has history of hypothyroidism, GERD, allergic rhinitis, migraines, hiatal hernia, lumbar DDD, pituitary adenoma s/p resection, IgG4 pseudotumor of liver, chronic pancreatitis s/p TPAIT, gastroparesis, FTT, admitted 9/7-9/12/21 for PEG tube replacement by percutaneous GJ tube (9/7/21), complicated by displacement of the GJ tube, ultimately undergoing EGD with GI for replacement of PEG J-tube with T tag gastropexy on 9/10/21.    1.  Acute abdominal pain secondary to acute inflammation around PEG tube tract and spasm pains due to duodenal peristalsis following on G-tube, all are POA  This is the main problem that led to this admission.  The patient has history of known gastroparesis, chronic pancreatitis s/p pancreatectomy with islet cell transplant, s/p percutaneous gastrojejunostomy tube placement on 9/7/2021, s/p replacement of percutaneous gastrojejunostomy tube with T tag gastropexy on 9/10/2021.   On admission, the patient had unremarkable CT abdomen pelvis with contrast with no acute changes.  The patient received intravenous narcotics and antiemetics.  Given failure of the patient's symptoms to manuel with management in ED observation, patient was admitted to internal medicine service.    -Scheduled acetaminophen at 1 g every 8 hours based on recommendations of pain management service  -Started twice daily use of lidocaine cream at enteral tube site based on recommendations of pain management service  -Continue intravenous PPI twice daily  -Continue home dose of famotidine 20 mg daily  -Continue Phenergan IV as needed  -Continue G-tube to low intermittent suction  -Continue J-tube feeding  -Discontinued IV fluids  -Increased MiraLAX to twice daily days recommendations of  gastroenterology  -Continue Senokot S1-2 tablets every morning  -Resumed Prucalopride 2 mg every morning  -Continue Flexeril at 10 mg 3 times daily  -Continue Dilaudid 2 to 4 mg oral every 3 hours as needed for today  -Plan to space out Dilaudid 2 to 4 mg 2 every 4 hours followed by every 6 hours as needed in order to improve mood gastric motility  -Continue Dilaudid 0.3 mg IV every 4 hours as needed for moderate to severe pain  -The patient did not derive any benefit from gabapentin or pregabalin priorly  -I will discuss with gastroenterology about weaning amitriptyline given its detrimental side effect on delaying gastric motility, based on Nathan Lenz et al W July 2013  -Continue tube feeding  -Continue with clear liquid diet  -We will consider changing G J tube to short G-tube that is loose to allow tract to reduce inflammation or we will remove GJ altogether  -We highly appreciate the input of Dr. Park of gastroenterology service    2.  Depression, POA  -The patient was seen by clinical psychologist  -Continue amitriptyline 60 mg at bedtime for now  -I wonder of mirtazapine would be a better fit for this patient  -Consulted with inpatient psychiatry service    3. Chronic medical problems  ##Hx of IgG4 pseudotumor: Mild LFT abnormality with mildly elevated IgG4. Liver lesion previously biopsied with noticeable proliferation and acute and chronic inflammation and fibrosis of liver with focally increased IgG4-positive plasma cells consistent with IgG4 pseudotumor. Followed by hepatology.   - Outpatient follow up with GI     ##GERD:   - Resume famotidine po 20 mg BID, omeprazole po 40 mg BID at discharge     ##RAD:  ##Allergic rhinitis:   - Continue Cetrizine 10 mg daily  - Continue Benadryl 25 mg Q6H PRN  - Resume PTA montelukast 10 mg daily at discharge     ##Hypothyroidism:   - Continue PTA Levothyroxine 137 mcg daily      ##Hx of migraine HA:   - Continue PTA Amitriptyline 60 mg at bedtime  -  Continue Zolmitriptan 5 mg as needed at onset of headache, increased frequency to twice daily as needed    DVT Prophylaxis: Pneumatic Compression Devices  Code Status: Full Code  Expected discharge:  recommended to prior living arrangement once adequate pain management/ tolerating PO medications.    Daniel Colon MD  Text Page (7am - 6pm, M-F)    Interval History   The patient continued to complain of abdominal pain.  She denied any active nausea or vomiting.  Four-point review of systems is otherwise negative.    -Data reviewed today: I reviewed all new labs and imaging results over the last 24 hours. I personally reviewed no images or EKG's today.    Physical Exam   Temp: 98.4  F (36.9  C) Temp src: Oral BP: 112/72 Pulse: 76   Resp: 16 SpO2: 97 % O2 Device: None (Room air)    Vitals:    09/18/21 1705   Weight: 59 kg (130 lb)     Vital Signs with Ranges  Temp:  [98.2  F (36.8  C)-98.9  F (37.2  C)] 98.4  F (36.9  C)  Pulse:  [76-98] 76  Resp:  [16-18] 16  BP: ()/(58-76) 112/72  SpO2:  [97 %-99 %] 97 %  I/O last 3 completed shifts:  In: 822   Out: 800 [Urine:100; Emesis/NG output:700]    Constitutional: Awake, alert, cooperative, no apparent distress.  Eyes: Conjunctiva and pupils examined and normal.  HEENT: Moist mucous membranes, normal dentition.  Respiratory: Clear to auscultation bilaterally, no crackles or wheezing.  Cardiovascular: Regular rate and rhythm, normal S1 and S2, and no murmur noted.  GI: Soft, non-distended, non-tender, normal bowel sounds.  Lymph/Hematologic: No anterior cervical or supraclavicular adenopathy.  Skin: No rashes, no cyanosis, no edema.  Musculoskeletal: No joint swelling, erythema or tenderness.  Neurologic: Cranial nerves 2-12 intact, normal strength and sensation.  Psychiatric: Alert, oriented to person, place and time, no obvious anxiety or depression.    Medications     dextrose         amitriptyline  60 mg Oral or Feeding Tube At Bedtime     azelastine  1 spray Both  Nostrils BID     cetirizine  10 mg Oral or Feeding Tube QAM     cyclobenzaprine  10 mg Oral or Feeding Tube TID     famotidine  20 mg Oral QAM     levothyroxine  137 mcg Oral QAM AC     montelukast  10 mg Oral At Bedtime     multivitamins w/minerals  15 mL Per Feeding Tube Daily     pantoprazole (PROTONIX) IV  40 mg Intravenous BID     polyethylene glycol  17 g Oral QAM     Prucalopride Succinate  2 mg Oral QAM     scopolamine  1 patch Transdermal Q72H    And     scopolamine   Transdermal Q8H     senna-docusate  1-2 tablet Oral QAM       Data   Recent Labs   Lab 09/20/21  1702 09/20/21  1338 09/20/21  0841 09/19/21  0202 09/18/21  1936 09/18/21  1717 09/16/21  1308   WBC  --   --   --   --   --  5.4 6.5   HGB  --   --   --   --   --  13.6 13.0   MCV  --   --   --   --   --  93 96   PLT  --   --   --   --   --  426 387   NA  --   --   --   --   --  137  --    POTASSIUM  --   --   --   --   --  3.9  --    CHLORIDE  --   --   --   --   --  103  --    CO2  --   --   --   --   --  30  --    BUN  --   --   --   --   --  13  --    CR  --   --   --   --   --  0.73  --    ANIONGAP  --   --   --   --   --  4  --    KASSI  --   --   --   --   --  9.2  --    GLC 96 98 109*   < >  --  95  --    ALBUMIN  --   --   --   --   --  3.8  --    PROTTOTAL  --   --   --   --   --  7.7  --    BILITOTAL  --   --   --   --   --  0.3  --    ALKPHOS  --   --   --   --   --  133  --    ALT  --   --   --   --   --  77*  --    AST  --   --   --   --   --  29  --    LIPASE  --   --   --   --  12*  --   --     < > = values in this interval not displayed.       No results found for this or any previous visit (from the past 24 hour(s)).

## 2021-09-21 NOTE — PROGRESS NOTES
"  CLINICAL NUTRITION SERVICES - BRIEF NOTE     Nutrition Prescription      Recommendations already ordered by Registered Dietitian (RD):  Ordered Creon 24 + sodium bicarbonate 325 mg to attempt to unclog FT.    For unclogging J-tube* -> Crush sodium bicarbonate and add to 15 ml warm water.  Open Creon capsule.  Add Creon beads to sodium bicarbonate solution and let this sit for 20-30 minutes.  Then attempt to instill into this mixture into J-tube.  Let this sit for another 20 minutes.   Then attempt to pulse with warm water.    [employer] Peptide 1.5 @ 45 mL/hr (1080 mL/day) to provide 1661 kcal (28 kcal/kg), 80 g protein (1.4 g/kg), 149 g CHO, 10 g fiber, 83 g fat (40% from MCTs), and 756 mL free water daily   -start at 20 ml/hr x 4 hours, then advance to goal continuous rate of 45 ml/hr  -change relizorb cartridge every 12 hours for PERT    Future/Additional Recommendations:  Once patient tolerating continuous rates with new TF formula, recommend transitioning to cycled regimen:  Night 1: 65 ml/hr x 16 hours (change relizorb cartridge q 8 hours)  Night 2: 75 ml/hr x 14 hours (change relizorb cartridge q 7 hours)  Night 3: 85 ml/hr x 12 hours (change relizorb cartridge q 6 hours or \"double up\" on the relizorb cartridges at the start of the cycle)     EVALUATION OF THE PROGRESS TOWARD GOALS   Diet: Clear liquids    Nutrition Support: Vital 1.5 @ 90 ml/hr x 12 hours (did not run last evening d/t J-tube clogged).  Last received TF 9/20 AM.       NEW FINDINGS   Patient strongly prefers plant based TF formula.  Discussed options and use of relizorb vs Creon with these formulas.  Patient would like to try [employer] Peptide 1.5 with relizorb first.      INTERVENTIONS  Pagecody BURLESON and spoke with RN.  Ordered enzyme + sodium bicarbonate to attempt to unclog JT.  Spoke to patient re: new TF formula.  See adjustments made per this discussion above.  Patient okay with changing to continuous regimen to trial new formula. "     Monitoring/Evaluation  Progress toward goals will be monitored and evaluated per protocol.     Antoinette Larios MS, RD, LD  Pager [phone]

## 2021-09-21 NOTE — PROVIDER NOTIFICATION
Obs 6 - Dinora Mcghee - Patient was told by Dr. Park he was going to start her on LR today. Have you heard anything regarding that? Would you still like her on D10 infusion? Fidelia [phone]    Update - Repaged

## 2021-09-21 NOTE — PROVIDER NOTIFICATION
Obs 6 - Dinora Mcghee - Patient would like an update on her cares. Would you be able to come see her? [phone] Fidelia

## 2021-09-21 NOTE — CONSULTS
[facility] Nurse Inpatient Wound Assessment   Reason for consultation: Evaluate and treat  GJ tube wounds    Assessment  GJ tube wounds due to Moisture Associated Skin Damage (MASD)  Status: initial assessment and evolving    Treatment Plan  GJ tube wounds: BID     Cleanse the area with NS or warm water,very gently with soft cloth.    Apply thin layer of critic aid paste around the tube.    With repeat application, do not scrub the paste, only remove soiled paste and reapply.    Place single layer of split gauze under the bumper and ensure tube is snug around the skin.    Secure the tube using tube stabilizer or abdominal binder.     Orders Written  Recommended provider order: Gastrology already involved  WO Nurse follow-up plan:weekly  Nursing to notify the Provider(s) and re-consult the WOC Nurse if wound(s) deteriorates or new skin concern.    Patient History  According to provider note(s):  Dinora Mcghee is a 56 year old female with a history of hypothyroidism, GERD, allergic rhinitis, migraines, hiatal hernia, lumbar DDD, pituitary adenoma s/p resection, IgG4 pseudotumor of liver, chronic pancreatitis s/p TPAIT (2016), gastroparesis, FTT, admitted 9/7-9/12/21 for PEG tube replacement by percutaneous GJ tube (9/7/21), complicated by displacement of the GJ tube, underwent PEG J-tube revision with T tag gastropexy on 9/10/21, presenting with 3 days of pain at the enteric tube sites and some fullness in the PEGJ tube insertion site.    Objective Data  Containment of urine/stool: Continent of bladder and Continent of bowel    Active Diet Order  Orders Placed This Encounter      Clear Liquid Diet      Output:   I/O last 3 completed shifts:  In: 685 [P.O.:200; NG/GT:150]  Out: 1400 [Emesis/NG output:1400]    Risk Assessment:   Sensory Perception: 4-->no impairment  Moisture: 4-->rarely moist  Activity: 4-->walks frequently  Mobility: 4-->no limitation  Nutrition: 2-->probably inadequate  Friction and Shear: 3-->no  apparent problem  Martinez Score: 21                          Labs: Recent Labs   Lab 09/21/21  0632 09/18/21  1717 09/18/21  1717   ALBUMIN 3.1*   < > 3.8   HGB 12.5   < > 13.6   WBC 4.9   < > 5.4   A1C  --   --  5.5    < > = values in this interval not displayed.       Physical Exam  Areas of skin assessed: focused GJ tube    Wound Location:  GJ tube  Date of last photo   Wound History: See above. GJ tube clogged, plan for replacement    Wound Base: 100 % erythematous and minimal hypergranular tissue     Palpation of the wound bed: normal      Drainage: small     Description of drainage: serous and cloudy     Measurements (length x width x depth, in cm) 0.3  x 1  x  0.0 cm      Tunneling N/A     Undermining N/A  Periwound skin: intact      Color: normal and consistent with surrounding tissue      Temperature: normal   Odor: none  Pain: severe, aching  Pain intervention prior to dressing change:     Interventions  Visual inspection and assessment completed   Wound Care Rationale Protect periwound skin and Provide protection   Wound Care: done per plan of care  Supplies: ordered: abdominal binder and tube stabilizer  Current off-loading measures: Pillows  Current support surface: Standard  Atmos Air mattress  Education provided to: importance of repositioning, plan of care, wound progress and Moisture management  Discussed plan of care with Patient and Nurse    Rima Hassan RN

## 2021-09-21 NOTE — PLAN OF CARE
"Shift: 1900-0730    Neuro: A&O x4 able to make needs known  Cardiac: WDL VSS   Respiratory: WDL on RA  GI/: Voiding well; Intermittent nausea; G tube to low intermittent suction; J tube clamped at this time due to inability to flush/draw back    Skin: WDL ex for dressing to GJ tube site; skin intact   Lines: PIV TKO   Drains: GJ tube; G tube to suction; J tube clamped at this time   Diet: Clears  Pain: Pt reports abdominal pain at GJ tube site that is being treated with PRN dilaudid and scheduled tylenol  Plan: Team to reassess J tube in AM       BP 91/66 (BP Location: Right arm)   Pulse 82   Temp 98.5  F (36.9  C) (Oral)   Resp 16   Ht 1.702 m (5' 7\")   Wt 59 kg (130 lb)   SpO2 97%   BMI 20.36 kg/m     "

## 2021-09-21 NOTE — PLAN OF CARE
"Shift: 2841-3986    Neuro: A&O x4 able to make needs known  Cardiac: WDL VSS   Respiratory: WDL on RA  GI/: Voiding well; Intermittent nausea; G tube to low intermittent suction; J tube clamped at this time, unsuccessful attempt to unclog.   Skin: WDL ex for dressing to GJ tube site; skin intact   Lines: PIV TKO   Drains: GJ tube; G tube to suction; J tube clamped at this time   Diet: Clears  Pain: Pt reports abdominal pain at GJ tube site that is being treated with PRN dilaudid and scheduled tylenol  Plan: Team to reassess J tube in AM       BP 90/57 (BP Location: Right arm)   Pulse 72   Temp 97.4  F (36.3  C) (Oral)   Resp 18   Ht 1.702 m (5' 7\")   Wt 59 kg (130 lb)   SpO2 98%   BMI 20.36 kg/m     "

## 2021-09-21 NOTE — PROVIDER NOTIFICATION
Gold cross cover paged regarding J tube not working to start tube feedings as ordered. J tube was flushed with warm water and clog zapper was used x2 with no results. MD OK with not starting TF overnight due to J tube not functioing, and day team will reassess.

## 2021-09-21 NOTE — PLAN OF CARE
Obs 6 - Dinora Mcghee - Patient would like an update on her plan of care. Could you come see her? Fidelia [phone]

## 2021-09-21 NOTE — CONSULTS
Consult Date: 09/21/2021    IDENTIFICATION:  Ms. Loo is a 56-year-old  white female who is currently hospitalized with a flareup of pancreatitis.  She has a number of other significant medical issues including gastroparesis.  I am asked to evaluate her depression by Dr. Colon.    HISTORY:  Ms. Loo does have a history of recurrent depression.  She says it is often situational basis, but she also has an extremely strong family history of depression.  In the past, she was treated with Zoloft, but did not find it particularly helpful.  She may have been treated with other SSRIs in the past.  She tells me that she has had poor sleep, poor appetite, decreased interest, decreased energy, but she denies suicidal ideation.  She has had some feelings of helplessness and hopelessness.  We discussed multiple potential medications.  I suggested that given her gastroparesis it might be reasonable to have a trial of mirtazapine.  There are some case reports that suggests mirtazapine is helpful in gastroparesis.  Also, it might help her with her sleep.  I note that she is also on amitriptyline 60 mg.  This has been for migraine headaches and abdominal pain.  That should not be a significant drug-drug interaction.  I did discuss other potential treatments with the patient and suggested that if Remeron does not turn out to be a satisfactory medication, my next recommendation would be a trial of Lexapro.  Happily, the patient already has a therapist.    PAST MEDICAL HISTORY:  Includes allergies, arthritis, status post cholecystectomy, chronic abdominal pain, chronic constipation, chronic pancreatitis, degenerative joint disease, esophageal reflux, gastroparesis, hiatal hernia, history of pituitary adenoma, hypothyroidism, migraines, hyperlipidemia.  The patient is on and off tube feedings.  She has pancreatitis, postoperative nausea and vomiting, history of a Port-A-Cath and unspecified hearing loss on the  right.    ALLERGIES:  SHE HAS ALLERGIES TO, CORTICOSTEROIDS, DEPAKOTE, ZITHROMAX,  DARVOCET, MORPHINE, NALBUPHINE, ZOSYN, BACTRIM, CATS, COMPAZINE, DUST MITES, GRASS, RAGWEED, ADHESIVE TAPE, TRAMADOL, TREES, ZOFRAN, FLAGYL.     FAMILY HISTORY:  The patient's family history is quite positive for depression.  Her children are all treated for depression.  She has a son who made a suicide attempt, many are on Prozac as is .  Her father's side of the family has a good deal of anxiety.    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  She is .  She has children.  She quit smoking in 1992.  She does not abuse drugs or alcohol.    REVIEW OF SYSTEMS:  On my interview, the patient denied problems with vision or hearing, though she does have some mild right sided hearing loss.  She denied chest pain or shortness of breath.  She has chronic significant abdominal pain and constipation.  She denied genitourinary symptoms or problems with muscles, skin or joints.  MENTAL STATUS EXAMINATION:  The patient was a pleasant, cooperative white female.  Her mood is described as depressed and her affect was generally cheerful, but intermittently she would be tearful and crying.  Her speech was coherent and goal oriented.  Her associations were tight.  Her thought processes logical and linear.  Her content of thought was without psychosis or suicidal ideation.  Recent and remote memory, concentration, fund of knowledge and use of language are at baseline.  She is alert and oriented x3.  Muscle strength and tone are generally at baseline, though she does have a significant weight loss.  Recent vitals include a blood pressure of 90/57, temperature of 97.4, pulse of 72, respiration rate of 18 with 98% oxygen saturation.    IMPRESSION:  Recurrent major depressive disorder, moderate.    RECOMMENDATIONS:  Remeron 30 mg at bedtime.  If this turns out to be unsatisfactory  I would suggest a trial of Lexapro.    Estrada Matt MD        D: 2021   T: 2021   MT: [id number]    Name:     MALINI DAVIS  MRN:      [id number]        Account:      [id number]   :      1965           Consult Date: 2021     Document: [id number]

## 2021-09-21 NOTE — PROGRESS NOTES
GASTROENTEROLOGY PROGRESS NOTE    Date: 09/21/2021     ASSESSMENT:  Dinora Mcghee is a 56 year old female with a history of hypothyroidism, GERD, allergic rhinitis, migraines, hiatal hernia, lumbar DDD, pituitary adenoma s/p resection, IgG4 pseudotumor of liver, chronic pancreatitis s/p TPAIT (2016), gastroparesis, FTT, admitted 9/7-9/12/21 for PEG tube replacement by percutaneous GJ tube (9/7/21), complicated by displacement of the GJ tube, underwent PEG J-tube revision with T tag gastropexy on 9/10/21, presenting with 3 days of pain at the enteric tube sites and some fullness in the PEGJ tube insertion site.     As per Dr. Park's note  - Pt had same intolerance of previous GJ pre-TPIAT, and post TPIAT, therefore may be a visceral intolerance BUT clearly has acute/semi acute inflammation around PEG tube tract, that may or may not resolve.   - Spasm pain probably due to jejunal peristalsis pulling on G tube and impacting bumper in abd wall.  - Has gastroparesis so gastric distention probably playing a role    Now with clogged tube since 9/21/21, which likely related to relatively small tube. Will plan to exchange to larger tube if remains clogged by 9/22/21 pm.     # PEGJ pain  # Malnutrition s/p PEGJ placement  # Constipation, related to opioid use during admission  # Gastroparesis     RECOMMENDATIONS:  -- Tube feeding clog management as per primary team -> if remains clogged, we will plan for tube replacement tomorrow 9/22/21  -- Please start patient on maintenance lactate ringer while unable to do tube feeding.  -- Discussed with patient, will switch her formula to plant-based formula  -- Appreciate pain, nutrition, and psychiatry consult  -- Schedule milarax to BID for constipation related to opioid  -- Continue home M[employer] for gastroparesis    Gastroenterology follow up recommendations: Pending clinical course.      Thank you for involving us in this patient's care. Please do not hesitate to contact  "the GI service with any questions or concerns.      Pt care plan discussed with Dr. Park, GI staff physician.    Maren Flores MD  Gastroenterology Fellow  Division of Gastroenterology, Hepatology and Nutrition  [facility]  Page [phone]  _______________________________________________________________    Subjective: No bowel movement for the past 2 days, is taking daily miralax. Passing gas. Still having pain around the PEGJ tube, requiring po hydromorphone.     Objective:  Blood pressure 90/57, pulse 72, temperature 97.4  F (36.3  C), temperature source Oral, resp. rate 18, height 1.702 m (5' 7\"), weight 59 kg (130 lb), SpO2 98 %, not currently breastfeeding.    Gen: A&Ox3, NAD  HEENT: sclera anicteric  CV: RRR  Lungs: breathing comfortably on room air  Abd: Non-distended, +bs, no hepatosplenomegaly, Tender around PEGJ tube area, not tender on other area, no peritoneal signs, PEGJ tube with surrounding erythema, non-exudative.   Skin: no jaundice, no stigmata of chronic liver disease  MS: appropriate muscle mass for age  Neuro: non focal       LABS:  BMP  Recent Labs   Lab 09/21/21  0632 09/21/21  0600 09/21/21  0310 09/20/21  2222 09/19/21  0202 09/18/21  1717     --   --   --   --  137   POTASSIUM 3.9  --   --   --   --  3.9   CHLORIDE 103  --   --   --   --  103   KASSI 9.0  --   --   --   --  9.2   CO2 31  --   --   --   --  30   BUN 10  --   --   --   --  13   CR 0.78  --   --   --   --  0.73   GLC 91 91 90 103*   < > 95    < > = values in this interval not displayed.     CBC  Recent Labs   Lab 09/21/21  0632 09/18/21  1717 09/18/21  1717 09/16/21  1308 09/16/21  1308   WBC 4.9  --  5.4  --  6.5   RBC 3.99  --  4.29  --  4.15   HGB 12.5   < > 13.6   < > 13.0   HCT 38.4  --  39.8  --  39.7   MCV 96  --  93  --  96   MCH 31.3  --  31.7  --  31.3   MCHC 32.6  --  34.2  --  32.7   RDW 13.5  --  13.3  --  13.5     --  426  --  387    < > = values in this interval not displayed. "     INRNo lab results found in last 7 days.  LFTs  Recent Labs   Lab 09/21/21  0632 09/18/21  1717   ALKPHOS 107 133   AST 38 29   ALT 66* 77*   BILITOTAL 0.5 0.3   PROTTOTAL 6.5* 7.7   ALBUMIN 3.1* 3.8      PANC  Recent Labs   Lab 09/18/21  1936   LIPASE 12*       IMAGING:  No new in 24 hr.

## 2021-09-21 NOTE — PROVIDER NOTIFICATION
Text paged team re- Dinora Mcghee 7    Unsuccessful unclogging the J tube with creon and sodium bicarbonate.   Please advice next step.    Adele [phone]

## 2021-09-22 ENCOUNTER — APPOINTMENT (OUTPATIENT)
Dept: GENERAL RADIOLOGY | Facility: CLINIC | Age: 56
End: 2021-09-22
Attending: INTERNAL MEDICINE
Payer: COMMERCIAL

## 2021-09-22 ENCOUNTER — ANESTHESIA (OUTPATIENT)
Dept: SURGERY | Facility: CLINIC | Age: 56
End: 2021-09-22
Payer: COMMERCIAL

## 2021-09-22 ENCOUNTER — ANESTHESIA EVENT (OUTPATIENT)
Dept: SURGERY | Facility: CLINIC | Age: 56
End: 2021-09-22
Payer: COMMERCIAL

## 2021-09-22 LAB
ALBUMIN SERPL-MCNC: 3.4 G/DL (ref 3.4–5)
ALP SERPL-CCNC: 116 U/L (ref 40–150)
ALT SERPL W P-5'-P-CCNC: 74 U/L (ref 0–50)
ANION GAP SERPL CALCULATED.3IONS-SCNC: 3 MMOL/L (ref 3–14)
AST SERPL W P-5'-P-CCNC: 38 U/L (ref 0–45)
BASOPHILS # BLD AUTO: 0.1 10E3/UL (ref 0–0.2)
BASOPHILS NFR BLD AUTO: 1 %
BILIRUB SERPL-MCNC: 0.6 MG/DL (ref 0.2–1.3)
BUN SERPL-MCNC: 9 MG/DL (ref 7–30)
CALCIUM SERPL-MCNC: 9.4 MG/DL (ref 8.5–10.1)
CHLORIDE BLD-SCNC: 102 MMOL/L (ref 94–109)
CO2 SERPL-SCNC: 34 MMOL/L (ref 20–32)
CREAT SERPL-MCNC: 0.81 MG/DL (ref 0.52–1.04)
EOSINOPHIL # BLD AUTO: 0.4 10E3/UL (ref 0–0.7)
EOSINOPHIL NFR BLD AUTO: 7 %
ERYTHROCYTE [DISTWIDTH] IN BLOOD BY AUTOMATED COUNT: 13.4 % (ref 10–15)
GFR SERPL CREATININE-BSD FRML MDRD: 82 ML/MIN/1.73M2
GLUCOSE BLD-MCNC: 82 MG/DL (ref 70–99)
GLUCOSE BLDC GLUCOMTR-MCNC: 105 MG/DL (ref 70–99)
GLUCOSE BLDC GLUCOMTR-MCNC: 138 MG/DL (ref 70–99)
GLUCOSE BLDC GLUCOMTR-MCNC: 46 MG/DL (ref 70–99)
GLUCOSE BLDC GLUCOMTR-MCNC: 64 MG/DL (ref 70–99)
GLUCOSE BLDC GLUCOMTR-MCNC: 73 MG/DL (ref 70–99)
GLUCOSE BLDC GLUCOMTR-MCNC: 78 MG/DL (ref 70–99)
GLUCOSE BLDC GLUCOMTR-MCNC: 82 MG/DL (ref 70–99)
GLUCOSE BLDC GLUCOMTR-MCNC: 85 MG/DL (ref 70–99)
GLUCOSE BLDC GLUCOMTR-MCNC: 89 MG/DL (ref 70–99)
HCT VFR BLD AUTO: 39.1 % (ref 35–47)
HGB BLD-MCNC: 13 G/DL (ref 11.7–15.7)
IMM GRANULOCYTES # BLD: 0 10E3/UL
IMM GRANULOCYTES NFR BLD: 0 %
LYMPHOCYTES # BLD AUTO: 2.9 10E3/UL (ref 0.8–5.3)
LYMPHOCYTES NFR BLD AUTO: 52 %
MAGNESIUM SERPL-MCNC: 2 MG/DL (ref 1.6–2.3)
MCH RBC QN AUTO: 31.6 PG (ref 26.5–33)
MCHC RBC AUTO-ENTMCNC: 33.2 G/DL (ref 31.5–36.5)
MCV RBC AUTO: 95 FL (ref 78–100)
MONOCYTES # BLD AUTO: 0.7 10E3/UL (ref 0–1.3)
MONOCYTES NFR BLD AUTO: 13 %
NEUTROPHILS # BLD AUTO: 1.5 10E3/UL (ref 1.6–8.3)
NEUTROPHILS NFR BLD AUTO: 27 %
NRBC # BLD AUTO: 0 10E3/UL
NRBC BLD AUTO-RTO: 0 /100
PHOSPHATE SERPL-MCNC: 4.4 MG/DL (ref 2.5–4.5)
PLATELET # BLD AUTO: 441 10E3/UL (ref 150–450)
POTASSIUM BLD-SCNC: 3.6 MMOL/L (ref 3.4–5.3)
PROT SERPL-MCNC: 7.2 G/DL (ref 6.8–8.8)
RBC # BLD AUTO: 4.11 10E6/UL (ref 3.8–5.2)
SODIUM SERPL-SCNC: 139 MMOL/L (ref 133–144)
UPPER GI ENDOSCOPY: NORMAL
WBC # BLD AUTO: 5.5 10E3/UL (ref 4–11)

## 2021-09-22 PROCEDURE — 250N000009 HC RX 250: Performed by: INTERNAL MEDICINE

## 2021-09-22 PROCEDURE — 272N000001 HC OR GENERAL SUPPLY STERILE: Performed by: INTERNAL MEDICINE

## 2021-09-22 PROCEDURE — 250N000009 HC RX 250: Performed by: NURSE ANESTHETIST, CERTIFIED REGISTERED

## 2021-09-22 PROCEDURE — 258N000003 HC RX IP 258 OP 636: Performed by: ANESTHESIOLOGY

## 2021-09-22 PROCEDURE — 250N000013 HC RX MED GY IP 250 OP 250 PS 637: Performed by: INTERNAL MEDICINE

## 2021-09-22 PROCEDURE — 250N000011 HC RX IP 250 OP 636: Performed by: NURSE PRACTITIONER

## 2021-09-22 PROCEDURE — 250N000011 HC RX IP 250 OP 636: Performed by: NURSE ANESTHETIST, CERTIFIED REGISTERED

## 2021-09-22 PROCEDURE — 250N000013 HC RX MED GY IP 250 OP 250 PS 637: Performed by: PHYSICIAN ASSISTANT

## 2021-09-22 PROCEDURE — 258N000003 HC RX IP 258 OP 636: Performed by: NURSE ANESTHETIST, CERTIFIED REGISTERED

## 2021-09-22 PROCEDURE — 99233 SBSQ HOSP IP/OBS HIGH 50: CPT | Performed by: INTERNAL MEDICINE

## 2021-09-22 PROCEDURE — 84100 ASSAY OF PHOSPHORUS: CPT | Performed by: INTERNAL MEDICINE

## 2021-09-22 PROCEDURE — 83735 ASSAY OF MAGNESIUM: CPT | Performed by: INTERNAL MEDICINE

## 2021-09-22 PROCEDURE — 999N000179 XR SURGERY CARM FLUORO LESS THAN 5 MIN W STILLS: Mod: TC

## 2021-09-22 PROCEDURE — 370N000017 HC ANESTHESIA TECHNICAL FEE, PER MIN: Performed by: INTERNAL MEDICINE

## 2021-09-22 PROCEDURE — 710N000009 HC RECOVERY PHASE 1, LEVEL 1, PER MIN: Performed by: INTERNAL MEDICINE

## 2021-09-22 PROCEDURE — 360N000082 HC SURGERY LEVEL 2 W/ FLUORO, PER MIN: Performed by: INTERNAL MEDICINE

## 2021-09-22 PROCEDURE — 250N000011 HC RX IP 250 OP 636: Performed by: INTERNAL MEDICINE

## 2021-09-22 PROCEDURE — 80053 COMPREHEN METABOLIC PANEL: CPT | Performed by: INTERNAL MEDICINE

## 2021-09-22 PROCEDURE — 250N000011 HC RX IP 250 OP 636: Performed by: PHYSICIAN ASSISTANT

## 2021-09-22 PROCEDURE — 0D2DXUZ CHANGE FEEDING DEVICE IN LOWER INTESTINAL TRACT, EXTERNAL APPROACH: ICD-10-PCS | Performed by: INTERNAL MEDICINE

## 2021-09-22 PROCEDURE — 36415 COLL VENOUS BLD VENIPUNCTURE: CPT | Performed by: INTERNAL MEDICINE

## 2021-09-22 PROCEDURE — 250N000013 HC RX MED GY IP 250 OP 250 PS 637: Performed by: NURSE PRACTITIONER

## 2021-09-22 PROCEDURE — 258N000003 HC RX IP 258 OP 636: Performed by: INTERNAL MEDICINE

## 2021-09-22 PROCEDURE — 120N000002 HC R&B MED SURG/OB UMMC

## 2021-09-22 PROCEDURE — C1769 GUIDE WIRE: HCPCS | Performed by: INTERNAL MEDICINE

## 2021-09-22 PROCEDURE — 999N000141 HC STATISTIC PRE-PROCEDURE NURSING ASSESSMENT: Performed by: INTERNAL MEDICINE

## 2021-09-22 PROCEDURE — 85025 COMPLETE CBC W/AUTO DIFF WBC: CPT | Performed by: INTERNAL MEDICINE

## 2021-09-22 RX ORDER — LIDOCAINE 40 MG/G
CREAM TOPICAL
Status: DISCONTINUED | OUTPATIENT
Start: 2021-09-22 | End: 2021-09-24 | Stop reason: HOSPADM

## 2021-09-22 RX ORDER — PROPOFOL 10 MG/ML
INJECTION, EMULSION INTRAVENOUS CONTINUOUS PRN
Status: DISCONTINUED | OUTPATIENT
Start: 2021-09-22 | End: 2021-09-22

## 2021-09-22 RX ORDER — SODIUM CHLORIDE, SODIUM LACTATE, POTASSIUM CHLORIDE, CALCIUM CHLORIDE 600; 310; 30; 20 MG/100ML; MG/100ML; MG/100ML; MG/100ML
INJECTION, SOLUTION INTRAVENOUS CONTINUOUS
Status: DISCONTINUED | OUTPATIENT
Start: 2021-09-22 | End: 2021-09-22 | Stop reason: HOSPADM

## 2021-09-22 RX ORDER — FENTANYL CITRATE 50 UG/ML
25 INJECTION, SOLUTION INTRAMUSCULAR; INTRAVENOUS EVERY 5 MIN PRN
Status: DISCONTINUED | OUTPATIENT
Start: 2021-09-22 | End: 2021-09-22 | Stop reason: HOSPADM

## 2021-09-22 RX ORDER — LIDOCAINE HYDROCHLORIDE 40 MG/ML
SOLUTION TOPICAL PRN
Status: DISCONTINUED | OUTPATIENT
Start: 2021-09-22 | End: 2021-09-22

## 2021-09-22 RX ORDER — SODIUM CHLORIDE, SODIUM LACTATE, POTASSIUM CHLORIDE, CALCIUM CHLORIDE 600; 310; 30; 20 MG/100ML; MG/100ML; MG/100ML; MG/100ML
INJECTION, SOLUTION INTRAVENOUS CONTINUOUS
Status: DISCONTINUED | OUTPATIENT
Start: 2021-09-22 | End: 2021-09-22

## 2021-09-22 RX ORDER — LIDOCAINE HYDROCHLORIDE 20 MG/ML
INJECTION, SOLUTION INFILTRATION; PERINEURAL PRN
Status: DISCONTINUED | OUTPATIENT
Start: 2021-09-22 | End: 2021-09-22

## 2021-09-22 RX ORDER — ONDANSETRON 2 MG/ML
4 INJECTION INTRAMUSCULAR; INTRAVENOUS EVERY 30 MIN PRN
Status: DISCONTINUED | OUTPATIENT
Start: 2021-09-22 | End: 2021-09-22 | Stop reason: HOSPADM

## 2021-09-22 RX ORDER — HYDROMORPHONE HYDROCHLORIDE 1 MG/ML
0.2 INJECTION, SOLUTION INTRAMUSCULAR; INTRAVENOUS; SUBCUTANEOUS EVERY 5 MIN PRN
Status: DISCONTINUED | OUTPATIENT
Start: 2021-09-22 | End: 2021-09-22 | Stop reason: HOSPADM

## 2021-09-22 RX ORDER — FENTANYL CITRATE 50 UG/ML
INJECTION, SOLUTION INTRAMUSCULAR; INTRAVENOUS PRN
Status: DISCONTINUED | OUTPATIENT
Start: 2021-09-22 | End: 2021-09-22

## 2021-09-22 RX ORDER — OXYCODONE HYDROCHLORIDE 5 MG/1
5 TABLET ORAL EVERY 4 HOURS PRN
Status: DISCONTINUED | OUTPATIENT
Start: 2021-09-22 | End: 2021-09-22 | Stop reason: HOSPADM

## 2021-09-22 RX ORDER — ONDANSETRON 4 MG/1
4 TABLET, ORALLY DISINTEGRATING ORAL EVERY 30 MIN PRN
Status: DISCONTINUED | OUTPATIENT
Start: 2021-09-22 | End: 2021-09-22 | Stop reason: HOSPADM

## 2021-09-22 RX ORDER — PROPOFOL 10 MG/ML
INJECTION, EMULSION INTRAVENOUS PRN
Status: DISCONTINUED | OUTPATIENT
Start: 2021-09-22 | End: 2021-09-22

## 2021-09-22 RX ORDER — PANTOPRAZOLE SODIUM 40 MG/1
40 TABLET, DELAYED RELEASE ORAL
Status: DISCONTINUED | OUTPATIENT
Start: 2021-09-22 | End: 2021-09-24 | Stop reason: HOSPADM

## 2021-09-22 RX ORDER — AMOXICILLIN 250 MG
1-2 CAPSULE ORAL 2 TIMES DAILY
Status: DISCONTINUED | OUTPATIENT
Start: 2021-09-23 | End: 2021-09-24 | Stop reason: HOSPADM

## 2021-09-22 RX ADMIN — PHENYLEPHRINE HYDROCHLORIDE 200 MCG: 10 INJECTION INTRAVENOUS at 09:49

## 2021-09-22 RX ADMIN — MIDAZOLAM 2 MG: 1 INJECTION INTRAMUSCULAR; INTRAVENOUS at 09:17

## 2021-09-22 RX ADMIN — ACETAMINOPHEN 975 MG: 325 TABLET, FILM COATED ORAL at 06:22

## 2021-09-22 RX ADMIN — PROCHLORPERAZINE MALEATE 10 MG: 10 TABLET ORAL at 02:10

## 2021-09-22 RX ADMIN — HYDROMORPHONE HYDROCHLORIDE 4 MG: 2 TABLET ORAL at 06:23

## 2021-09-22 RX ADMIN — SODIUM CHLORIDE, POTASSIUM CHLORIDE, SODIUM LACTATE AND CALCIUM CHLORIDE: 600; 310; 30; 20 INJECTION, SOLUTION INTRAVENOUS at 09:20

## 2021-09-22 RX ADMIN — CYCLOBENZAPRINE HYDROCHLORIDE 10 MG: 5 TABLET, FILM COATED ORAL at 15:23

## 2021-09-22 RX ADMIN — ZOLMITRIPTAN 5 MG: 5 TABLET, FILM COATED ORAL at 21:45

## 2021-09-22 RX ADMIN — PROPOFOL 100 MG: 10 INJECTION, EMULSION INTRAVENOUS at 09:25

## 2021-09-22 RX ADMIN — MIRTAZAPINE 30 MG: 30 TABLET, ORALLY DISINTEGRATING ORAL at 21:45

## 2021-09-22 RX ADMIN — ACETAMINOPHEN 975 MG: 325 TABLET, FILM COATED ORAL at 13:04

## 2021-09-22 RX ADMIN — HYDROMORPHONE HYDROCHLORIDE 4 MG: 2 TABLET ORAL at 13:05

## 2021-09-22 RX ADMIN — MONTELUKAST 10 MG: 10 TABLET, FILM COATED ORAL at 21:45

## 2021-09-22 RX ADMIN — HYDROMORPHONE HYDROCHLORIDE 4 MG: 2 TABLET ORAL at 02:05

## 2021-09-22 RX ADMIN — LIDOCAINE HYDROCHLORIDE 5 ML: 40 SOLUTION TOPICAL at 09:25

## 2021-09-22 RX ADMIN — POLYETHYLENE GLYCOL 3350 17 G: 17 POWDER, FOR SOLUTION ORAL at 15:23

## 2021-09-22 RX ADMIN — POLYETHYLENE GLYCOL 3350 17 G: 17 POWDER, FOR SOLUTION ORAL at 20:04

## 2021-09-22 RX ADMIN — PHENYLEPHRINE HYDROCHLORIDE 100 MCG: 10 INJECTION INTRAVENOUS at 10:22

## 2021-09-22 RX ADMIN — PANTOPRAZOLE SODIUM 40 MG: 40 TABLET, DELAYED RELEASE ORAL at 17:35

## 2021-09-22 RX ADMIN — HYDROMORPHONE HYDROCHLORIDE 4 MG: 2 TABLET ORAL at 22:03

## 2021-09-22 RX ADMIN — ACETAMINOPHEN 975 MG: 325 TABLET, FILM COATED ORAL at 21:45

## 2021-09-22 RX ADMIN — LIDOCAINE HYDROCHLORIDE 100 MG: 20 INJECTION, SOLUTION INFILTRATION; PERINEURAL at 09:26

## 2021-09-22 RX ADMIN — PHENYLEPHRINE HYDROCHLORIDE 100 MCG: 10 INJECTION INTRAVENOUS at 09:41

## 2021-09-22 RX ADMIN — AZELASTINE HYDROCHLORIDE 1 SPRAY: 137 SPRAY, METERED NASAL at 20:03

## 2021-09-22 RX ADMIN — SUGAMMADEX 200 MG: 100 INJECTION, SOLUTION INTRAVENOUS at 10:08

## 2021-09-22 RX ADMIN — LIDOCAINE: 40 CREAM TOPICAL at 20:09

## 2021-09-22 RX ADMIN — ROCURONIUM BROMIDE 30 MG: 10 INJECTION INTRAVENOUS at 09:25

## 2021-09-22 RX ADMIN — AMITRIPTYLINE HYDROCHLORIDE 60 MG: 10 TABLET, FILM COATED ORAL at 21:45

## 2021-09-22 RX ADMIN — PROPOFOL 200 MCG/KG/MIN: 10 INJECTION, EMULSION INTRAVENOUS at 09:23

## 2021-09-22 RX ADMIN — CYCLOBENZAPRINE HYDROCHLORIDE 10 MG: 5 TABLET, FILM COATED ORAL at 20:03

## 2021-09-22 RX ADMIN — HYDROMORPHONE HYDROCHLORIDE 0.3 MG: 1 INJECTION, SOLUTION INTRAMUSCULAR; INTRAVENOUS; SUBCUTANEOUS at 02:10

## 2021-09-22 RX ADMIN — HYDROMORPHONE HYDROCHLORIDE 4 MG: 2 TABLET ORAL at 18:53

## 2021-09-22 RX ADMIN — PHENYLEPHRINE HYDROCHLORIDE 100 MCG: 10 INJECTION INTRAVENOUS at 09:35

## 2021-09-22 RX ADMIN — DIPHENHYDRAMINE HYDROCHLORIDE 12.5 MG: 50 INJECTION, SOLUTION INTRAMUSCULAR; INTRAVENOUS at 10:01

## 2021-09-22 RX ADMIN — FENTANYL CITRATE 50 MCG: 50 INJECTION, SOLUTION INTRAMUSCULAR; INTRAVENOUS at 09:26

## 2021-09-22 RX ADMIN — PHENYLEPHRINE HYDROCHLORIDE 200 MCG: 10 INJECTION INTRAVENOUS at 09:57

## 2021-09-22 RX ADMIN — HYDROMORPHONE HYDROCHLORIDE 0.3 MG: 1 INJECTION, SOLUTION INTRAMUSCULAR; INTRAVENOUS; SUBCUTANEOUS at 15:02

## 2021-09-22 RX ADMIN — SODIUM CHLORIDE, POTASSIUM CHLORIDE, SODIUM LACTATE AND CALCIUM CHLORIDE: 600; 310; 30; 20 INJECTION, SOLUTION INTRAVENOUS at 13:25

## 2021-09-22 RX ADMIN — PHENYLEPHRINE HYDROCHLORIDE 200 MCG: 10 INJECTION INTRAVENOUS at 09:31

## 2021-09-22 ASSESSMENT — ACTIVITIES OF DAILY LIVING (ADL)
ADLS_ACUITY_SCORE: 6

## 2021-09-22 ASSESSMENT — LIFESTYLE VARIABLES: TOBACCO_USE: 1

## 2021-09-22 ASSESSMENT — ENCOUNTER SYMPTOMS: SEIZURES: 0

## 2021-09-22 NOTE — PROGRESS NOTES
GASTROENTEROLOGY PROGRESS NOTE    Date: 09/22/2021     ASSESSMENT:  Dinora Mcghee is a 56 year old female with a history of hypothyroidism, GERD, allergic rhinitis, migraines, hiatal hernia, lumbar DDD, pituitary adenoma s/p resection, IgG4 pseudotumor of liver, chronic pancreatitis s/p TPAIT (2016), gastroparesis, FTT, admitted 9/7-9/12/21 for PEG tube replacement by percutaneous GJ tube (9/7/21), complicated by displacement of the GJ tube, underwent PEG J-tube revision with T tag gastropexy on 9/10/21, presenting with 3 days of pain at the enteric tube sites and some fullness in the PEGJ tube insertion site.     # PEGJ pain  # Malnutrition s/p PEGJ placement, lost 23 lbs in 6 months.   # Constipation, related to opioid use during admission  # Gastroparesis  Pt had same intolerance of previous GJ pre-TPIAT, and post TPIAT, therefore may be a visceral intolerance BUT clearly has acute/semi acute inflammation around PEG tube tract, that may or may not resolve. Spasm pain probably due to jejunal peristalsis pulling on G tube and impacting bumper in abd wall. Has gastroparesis so gastric distention probably playing a role.     PEGJ tube was clogged 9/21 so revision 9/22 showed coiled back of J tube in stomach, and new tube was placed. Pains remains the same post procedure. Overall patient might not tolerate the tube feeding. However, remains having significant weight loss from gastroparesis. Will discuss further direction with Dr. Park.       RECOMMENDATIONS:  -- OK to stop IVF while on tube feeding  -- Discussed with patient, will switch her formula to plant-based formula  -- Appreciate pain, nutrition, and psychiatry consult  -- Schedule milarax to BID for constipation related to opioid, can increase senna to BID  -- Continue home M[employer] for gastroparesis  -- Ambulate as much as possible  -- Will discuss further direction with Dr. Park.      Gastroenterology follow up recommendations: Pending clinical  "course.      Thank you for involving us in this patient's care. Please do not hesitate to contact the GI service with any questions or concerns.      Pt care plan discussed with Dr. Dodson, GI staff physician.    Maren Flores MD  Gastroenterology Fellow  Division of Gastroenterology, Hepatology and Nutrition  [facility]  Page [phone]  _______________________________________________________________    Subjective: No bowel movement for the past 3 days (since being admitted), is taking daily miralax. Passing gas. Still having pain around the PEGJ tube with bowel peritalsis, requiring po hydromorphone. Has not been ambulating much.    Objective:  Blood pressure 115/74, pulse 90, temperature 97.8  F (36.6  C), temperature source Oral, resp. rate 18, height 1.702 m (5' 7\"), weight 59 kg (130 lb), SpO2 97 %, not currently breastfeeding.    Gen: A&Ox3, NAD  HEENT: sclera anicteric  CV: RRR  Lungs: breathing comfortably on room air  Abd: Non-distended, +bs, no hepatosplenomegaly, Tender around PEGJ tube area, not tender on other area, no peritoneal signs, PEGJ tube with surrounding erythema, non-exudative.   Skin: no jaundice, no stigmata of chronic liver disease  MS: appropriate muscle mass for age  Neuro: non focal       LABS:  BMP  Recent Labs   Lab 09/22/21  1440 09/22/21  1351 09/22/21  1327 09/22/21  1310 09/22/21  0619 09/22/21  0603 09/21/21  2145 09/21/21  0632 09/19/21  0202 09/18/21  1717   NA  --   --   --   --   --  139  --  138  --  137   POTASSIUM  --   --   --   --   --  3.6  --  3.9  --  3.9   CHLORIDE  --   --   --   --   --  102  --  103  --  103   KASSI  --   --   --   --   --  9.4  --  9.0  --  9.2   CO2  --   --   --   --   --  34*  --  31  --  30   BUN  --   --   --   --   --  9  --  10  --  13   CR  --   --   --   --   --  0.81  --  0.78  --  0.73   * 85 64* 46*   < > 82   < > 91   < > 95    < > = values in this interval not displayed.     CBC  Recent Labs   Lab 09/22/21  0603 " 09/21/21  0632 09/21/21  0632 09/18/21  1717 09/18/21  1717 09/16/21  1308 09/16/21  1308   WBC 5.5  --  4.9  --  5.4  --  6.5   RBC 4.11  --  3.99  --  4.29  --  4.15   HGB 13.0   < > 12.5   < > 13.6   < > 13.0   HCT 39.1  --  38.4  --  39.8  --  39.7   MCV 95  --  96  --  93  --  96   MCH 31.6  --  31.3  --  31.7  --  31.3   MCHC 33.2  --  32.6  --  34.2  --  32.7   RDW 13.4  --  13.5  --  13.3  --  13.5     --  419  --  426  --  387    < > = values in this interval not displayed.     INRNo lab results found in last 7 days.  LFTs  Recent Labs   Lab 09/22/21  0603 09/21/21  0632 09/18/21 1717   ALKPHOS 116 107 133   AST 38 38 29   ALT 74* 66* 77*   BILITOTAL 0.6 0.5 0.3   PROTTOTAL 7.2 6.5* 7.7   ALBUMIN 3.4 3.1* 3.8      PANC  Recent Labs   Lab 09/18/21  1936   LIPASE 12*     IMAGING: No new in 24 hr.

## 2021-09-22 NOTE — ANESTHESIA PREPROCEDURE EVALUATION
Anesthesia Pre-Procedure Evaluation    Patient: Dinora Mcghee   MRN: [id number] : 1965        Preoperative Diagnosis: Malfunction of gastrostomy tube (H) [K94.23]   Procedure : Procedure(s):  REPLACEMENT, GASTROJEJUNOSTOMY TUBE, PERCUTANEOUS         Past Medical History:   Diagnosis Date     Allergic rhinitis, cause unspecified      Allergy to other foods     strawberries, apples, celeries, alice, watermelon     Arthritis     left knee     Choledocholithiasis     long after cholecystectomy     Chronic abdominal pain      Chronic constipation      Chronic nausea      Chronic pancreatitis (H)      Degeneration of lumbar or lumbosacral intervertebral disc      Esophageal reflux     w/ hiatal hernia     Gastroparesis      Hiatal hernia      History of pituitary adenoma     s/p resection     Hypothyroidism      Migraines      Mild hyperlipidemia      On tube feeding diet     presence of GJ tube     Pancreatic disease     PD stricture, 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 endometriosis growth     APPENDECTOMY       C  DELIVERY ONLY       C  DELIVERY ONLY      repeat c section with incidental cystotomy with repair     C EXCIS PITUITARY,TRANSNASAL/SEPTAL      pituitary tumor removed for diabetes insipidus     C TOTAL ABDOM HYSTERECTOMY      w/ bilateral salpingoophorectomy       SECTION       COLONOSCOPY       ENDOSCOPIC RETROGRADE CHOLANGIOPANCREATOGRAM N/A 2015    Procedure: ENDOSCOPIC RETROGRADE CHOLANGIOPANCREATOGRAM;  Surgeon: Mandeep Park MD;  Location: [facility]     ENDOSCOPIC RETROGRADE CHOLANGIOPANCREATOGRAM N/A 2016    Procedure: COMBINED ENDOSCOPIC RETROGRADE CHOLANGIOPANCREATOGRAPHY, PLACE  TUBE/STENT;  Surgeon: Mandeep Park MD;  Location: [facility]     ENDOSCOPIC RETROGRADE CHOLANGIOPANCREATOGRAM N/A 3/17/2016    Procedure: COMBINED ENDOSCOPIC RETROGRADE CHOLANGIOPANCREATOGRAPHY, REMOVE FOREIGN BODY OR STENT/TUBE;  Surgeon: Mandeep Park MD;  Location: [facility]     ENDOSCOPIC RETROGRADE CHOLANGIOPANCREATOGRAM N/A 8/2/2016    Procedure: COMBINED ENDOSCOPIC RETROGRADE CHOLANGIOPANCREATOGRAPHY, PLACE TUBE/STENT;  Surgeon: Mandeep Park MD;  Location: [facility]     ENDOSCOPIC RETROGRADE CHOLANGIOPANCREATOGRAM N/A 8/26/2016    Procedure: COMBINED ENDOSCOPIC RETROGRADE CHOLANGIOPANCREATOGRAPHY, REMOVE FOREIGN BODY OR STENT/TUBE;  Surgeon: Mandeep Park MD;  Location: [facility]     ENDOSCOPIC ULTRASOUND UPPER GASTROINTESTINAL TRACT (GI) N/A 10/3/2016    Procedure: ENDOSCOPIC ULTRASOUND, ESOPHAGOSCOPY / UPPER GASTROINTESTINAL TRACT (GI);  Surgeon: Guru Jose Rodas MD;  Location: [facility]     ESOPHAGOSCOPY, GASTROSCOPY, DUODENOSCOPY (EGD), COMBINED N/A 6/24/2015    Procedure: COMBINED ESOPHAGOSCOPY, GASTROSCOPY, DUODENOSCOPY (EGD), REMOVE FOREIGN BODY;  Surgeon: Mandeep Park MD;  [facility]: [facility]     ESOPHAGOSCOPY, GASTROSCOPY, DUODENOSCOPY (EGD), COMBINED N/A 10/25/2015    Procedure: COMBINED ESOPHAGOSCOPY, GASTROSCOPY, DUODENOSCOPY (EGD);  Surgeon: Sammy Amaro MD;  [facility]: [facility]     ESOPHAGOSCOPY, GASTROSCOPY, DUODENOSCOPY (EGD), COMBINED N/A 10/25/2015    Procedure: COMBINED ESOPHAGOSCOPY, GASTROSCOPY, DUODENOSCOPY (EGD), BIOPSY SINGLE OR MULTIPLE;  Surgeon: Sammy Amaro MD;  Location: [facility]     ESOPHAGOSCOPY, GASTROSCOPY, DUODENOSCOPY (EGD), DILATATION, COMBINED       EXCISE LESION TRUNK N/A 4/17/2017    Procedure: EXCISE LESION TRUNK;  Removal of Abdominal Foreign Body;  Surgeon: Nestor Phoenix MD;  [facility]     HC ESOPH/GAS REFLUX TEST W NASAL IMPED >1 HR N/A 11/19/2015    Procedure: ESOPHAGEAL IMPEDENCE FUNCTION TEST WITH 24  HOUR PH GREATER THAN 1 HOUR;  Surgeon: Thiago Apple MD;  Location: [facility]     HC UGI ENDOSCOPY DIAG W BIOPSY  9/17/08     HC UGI ENDOSCOPY DIAG W BIOPSY  9/27/12     HC UGI ENDOSCOPY W ESOPHAGEAL DILATION BALLOON <30MM  9/17/08     HC UGI ENDOSCOPY W EUS N/A 5/5/2015    Procedure: COMBINED ENDOSCOPIC ULTRASOUND, ESOPHAGOSCOPY, GASTROSCOPY, DUODENOSCOPY (EGD);  Surgeon: Wm Dueñas MD;  Location: [facility]     HC WRIST ARTHROSCOP,RELEASE XVERS LIG Bilateral 12/17/08     INJECT TRANSVERSUS ABDOMINIS PLANE (TAP) BLOCK BILATERAL Left 9/22/2016    Procedure: INJECT TRANSVERSUS ABDOMINIS PLANE (TAP) BLOCK BILATERAL;  Surgeon: Dickson Corrigan MD;  [facility]     laparoscopic pineda  1995     LAPAROSCOPIC HERNIORRHAPHY INCISIONAL N/A 8/23/2018    Procedure: LAPAROSCOPIC HERNIORRHAPHY INCISIONAL;  Laparoscopic Incisional Hernia Repair with Symbotex Mesh Implant;  Surgeon: Nestor Phoenix MD;  Location: [facility]     LAPAROSCOPIC PANCREATECTOMY, TRANSPLANT AUTO ISLET CELL N/A 12/28/2016    Procedure: LAPAROSCOPIC PANCREATECTOMY, TRANSPLANT AUTO ISLET CELL;  Surgeon: Nestor Phoenix MD;  [facility]     REPLACE GASTROJEJUNOSTOMY TUBE, PERCUTANEOUS N/A 9/7/2021    Procedure: GASTROJEJUNOSTOMY TUBE PLACEMENT, PERCUTANEOUS, WITH GASTROPEXY;  Surgeon: Mandeep Park MD;  [facility]     REPLACE JEJUNOSTOMY TUBE, PERCUTANEOUS N/A 9/10/2021    Procedure: UPPER ENDOSCOPY, REPLACEMENT OF PERCUTANEOUS GASTROJEJUNOSTOMY TUBE, T-TAG GASTROPEXY;  Surgeon: Zackery Montoya MD;  Location: [facility]     transphenoidal pituitary resection  1990      Allergies   Allergen Reactions     Apple Anaphylaxis     Corticosteroids Other (See Comments)     All oral, IV and injectable steroids are contraindicated (unless in life threatening situations) in [employer]let [employer] transplant recipients. They can cause irreversible loss of islet cell function. Please contact patient's transplant care coordinator ADI Gaffney RN at  [phone]/pager [phone] and/or endocrinologist prior to administration.       Depakote [Divalproex Sodium] Other (See Comments)     Chest pain     Zithromax [Azithromycin Dihydrate] Anaphylaxis     Noted in 08 ER     Darvocet [Propoxyphene N-Apap] Itching     Morphine Nausea and Vomiting and Rash     Nalbuphine Hcl Rash     RASH :nubaine     Zosyn [Piperacillin-Tazobactam In D5w] Rash     Possible allergy, did have a diffuse rash that seemed drug related but could have also been related to soap in the hospital.      Bactrim [Sulfamethoxazole W-Trimethoprim] Other (See Comments) and Nausea and Vomiting     Severely low liver function.     Cats      Compazine [Prochlorperazine] Other (See Comments)     Twitching. Takes Benedryl and is fine     Dust Mites      Grass      Ragweeds      Tape [Adhesive Tape] Blisters     Tramadol      Trees      Zofran [Ondansetron] Other (See Comments)     migraine     Flagyl [Metronidazole] Hives and Rash      Social History     Tobacco Use     Smoking status: Former Smoker     Packs/day: 0.50     Years: 6.00     Pack years: 3.00     Types: Cigarettes     Start date: 1985     Quit date: 1992     Years since quittin.7     Smokeless tobacco: Never Used     Tobacco comment: no 2nd hand   Substance Use Topics     Alcohol use: Not Currently     Alcohol/week: 3.0 - 6.0 standard drinks     Types: 1 - 2 Glasses of wine, 1 - 2 Cans of beer, 1 - 2 Shots of liquor per week     Comment: none since IGG      Wt Readings from Last 1 Encounters:   21 59 kg (130 lb)        Anesthesia Evaluation   Pt has had prior anesthetic. Type: General.    History of anesthetic complications  - PONV.  (responds well to scopolamine patch).    ROS/MED HX  ENT/Pulmonary: Comment: 3 pk yr hx, quit     (+) allergic rhinitis, tobacco use, Past use,  (-) asthma and sleep apnea   Neurologic:     (+) migraines,  (-) no seizures and no CVA   Cardiovascular:     (+) -----Previous cardiac  testing   Echo: Date: Results:    Stress Test: Date: Results:    ECG Reviewed: Date: 8/7/21 Results:  Sinus rhythm  Cannot rule out Anterior infarct , age undetermined  Abnormal ECG   Ventricular rate 66 bpm     Cath: Date: Results:      METS/Exercise Tolerance: 4 - Raking leaves, gardening Comment: Endorses SOB when carrying laundry upstairs. Denies CP. +Fatigue, less stamina 2/2 malnutrition     Hematologic:  - neg hematologic  ROS  (-) history of blood clots and history of blood transfusion   Musculoskeletal: Comment: lumbago, chrondromalacia, stiff shoulder  - neg musculoskeletal ROS     GI/Hepatic: Comment: Hx hepatic dome lesion/IgG4 pseudotumor and elevated LFT's of unclear etiology    Chronic pancreatitis    Severe gastroparesis    Malnutrition, wt loss  Hiatal hernia    Abdominal XR  9/9 showing the percutaneous gastrostomy tube is coiled within the stomach with distal tip at pylorus/proximal duodenum. This is notable for misplacement    (+) GERD, Asymptomatic on medication, hiatal hernia,     Renal/Genitourinary:  - neg Renal ROS     Endo: Comment: post-pancreatectomy diabetes; pancreatic insufficiency.     (+) thyroid problem, hypothyroidism,  (-) Type II DM   Psychiatric/Substance Use:  - neg psychiatric ROS     Infectious Disease:  - neg infectious disease ROS     Malignancy:  - neg malignancy ROS     Other:  - neg other ROS          Physical Exam    Airway        Mallampati: I    Neck ROM: full     Respiratory Devices and Support         Dental  no notable dental history         Cardiovascular   cardiovascular exam normal          Pulmonary   pulmonary exam normal                OUTSIDE LABS:  CBC:   Lab Results   Component Value Date    WBC 5.5 09/22/2021    WBC 4.9 09/21/2021    HGB 13.0 09/22/2021    HGB 12.5 09/21/2021    HCT 39.1 09/22/2021    HCT 38.4 09/21/2021     09/22/2021     09/21/2021     BMP:   Lab Results   Component Value Date     09/22/2021     09/21/2021     POTASSIUM 3.6 09/22/2021    POTASSIUM 3.9 09/21/2021    CHLORIDE 102 09/22/2021    CHLORIDE 103 09/21/2021    CO2 34 (H) 09/22/2021    CO2 31 09/21/2021    BUN 9 09/22/2021    BUN 10 09/21/2021    CR 0.81 09/22/2021    CR 0.78 09/21/2021    GLC 82 09/22/2021    GLC 82 09/22/2021     COAGS:   Lab Results   Component Value Date    PTT 29 01/07/2017    INR 1.13 07/20/2021    FIBR 225 12/28/2016     POC:   Lab Results   Component Value Date    BGM 85 08/24/2018    HCG Negative 12/19/2016     HEPATIC:   Lab Results   Component Value Date    ALBUMIN 3.4 09/22/2021    PROTTOTAL 7.2 09/22/2021    ALT 74 (H) 09/22/2021    AST 38 09/22/2021    ALKPHOS 116 09/22/2021    BILITOTAL 0.6 09/22/2021     OTHER:   Lab Results   Component Value Date    PH 7.49 (H) 12/28/2016    LACT 0.7 09/18/2021    A1C 5.5 09/18/2021    KASSI 9.4 09/22/2021    PHOS 4.4 09/22/2021    MAG 2.0 09/22/2021    LIPASE 12 (L) 09/18/2021    AMYLASE 45 01/23/2017    TSH 0.67 09/16/2021    T4 1.13 03/23/2018    CRP 90.0 (H) 12/29/2016    SED 10 09/16/2021     56-year-old female patient presented to the hospital with abdominal pain, nausea, and vomiting.  The patient has history of hypothyroidism, GERD, allergic rhinitis, migraines, hiatal hernia, lumbar DDD, pituitary adenoma s/p resection, IgG4 pseudotumor of liver, chronic pancreatitis s/p TPAIT, gastroparesis, FTT, admitted 9/7-9/12/21 for PEG tube replacement by percutaneous GJ tube (9/7/21), complicated by displacement of the GJ tube, ultimately undergoing EGD with GI for replacement of PEG J-tube with T tag gastropexy on 9/10/21.  Anesthesia Plan    ASA Status:  2   NPO Status:  NPO Appropriate    Anesthesia Type: MAC.   Induction: Intravenous, Propofol.   Maintenance: Balanced.        Consents    Anesthesia Plan(s) and associated risks, benefits, and realistic alternatives discussed. Questions answered and patient/representative(s) expressed understanding.     - Discussed with:  Patient      - Extended  Intubation/Ventilatory Support Discussed: No.      - Patient is DNR/DNI Status: No    Use of blood products discussed: No .     Postoperative Care    Pain management: IV analgesics.   PONV prophylaxis: Ondansetron (or other 5HT-3), Dexamethasone or Solumedrol     Comments:                Estrada Jimenez MD

## 2021-09-22 NOTE — PROGRESS NOTES
[facility]    Hospitalist Progress Note      Assessment & Plan   56-year-old female patient presented to the hospital with abdominal pain, nausea, and vomiting.  The patient has history of hypothyroidism, GERD, allergic rhinitis, migraines, hiatal hernia, lumbar DDD, pituitary adenoma s/p resection, IgG4 pseudotumor of liver, chronic pancreatitis s/p TPAIT, gastroparesis, FTT, admitted 9/7-9/12/21 for PEG tube replacement by percutaneous GJ tube (9/7/21), complicated by displacement of the GJ tube, ultimately undergoing EGD with GI for replacement of PEG J-tube with T tag gastropexy on 9/10/21.    1.  Acute abdominal pain secondary to acute inflammation around PEG tube tract and spasm pains due to duodenal peristalsis following on G-tube, all are POA  This is the main problem that led to this admission.  The patient has history of known gastroparesis, chronic pancreatitis s/p pancreatectomy with islet cell transplant, s/p percutaneous gastrojejunostomy tube placement on 9/7/2021, s/p replacement of percutaneous gastrojejunostomy tube with T tag gastropexy on 9/10/2021.   On admission, the patient had unremarkable CT abdomen pelvis with contrast with no acute changes.  The patient received intravenous narcotics and antiemetics.  Given failure of the patient's symptoms to manuel with management in ED observation, patient was admitted to internal medicine service.    -Attempted to declog the J-tube with Creon and sodium bicarb but unfortunately this did not help  -Continue scheduled acetaminophen at 1 g every 8 hours based on recommendations of pain management service  -Continue twice daily use of lidocaine cream at enteral tube site based on recommendations of pain management service  -Continue intravenous PPI twice daily  -Continue home dose of famotidine 20 mg daily  -Continue Phenergan IV as needed  -Continue G-tube to low intermittent suction  -Continue J-tube  feeding  -Resumed LR at rate of 75 cc/h  -Increased MiraLAX to 3 times daily days recommendations of gastroenterology  -Continue Senokot S1-2 tablets every morning  -Continue prucalopride 2 mg every morning  -Continue Flexeril at 10 mg 3 times daily  -Continue Dilaudid 2 to 4 mg oral every 3 hours as needed for 9/21/2021  -Plan to space out Dilaudid 2 to 4 mg 2 every 4 hours starting 9/23/2021 in order to provide some pain management andrez-procedure followed by every 6 hours as needed in order to improve mood gastric motility  -Continue Dilaudid 0.3 mg IV every 4 hours as needed for moderate to severe pain  -The patient did not derive any benefit from gabapentin or pregabalin priorly  -Continue tube feeding  -Continue with clear liquid diet  -Plan for tube replacement by gastroenterology service on 9/22/2021, so we will keep the patient as n.p.o. after midnight  -Plan to switch the patient to plant-based formula  -We highly appreciate the input of Dr. Park of gastroenterology service    2. GJ tube wounds due to Moisture Associated Skin Damage (MASD), all are POA  -We appreciate the input of wound management service    3.  Major depressive disorder, POA  -The patient was seen by clinical psychologist  -Continue amitriptyline 60 mg at bedtime for now  -Started mirtazapine at 30 mg daily  - If this did not work, then we would attempt to utilize escitalopram  -I highly appreciate the input of inpatient psychiatry service    4. Chronic medical problems  ##Hx of IgG4 pseudotumor: Mild LFT abnormality with mildly elevated IgG4. Liver lesion previously biopsied with noticeable proliferation and acute and chronic inflammation and fibrosis of liver with focally increased IgG4-positive plasma cells consistent with IgG4 pseudotumor. Followed by hepatology.   - Outpatient follow up with GI     ##GERD:   - Resume famotidine po 20 mg BID, omeprazole po 40 mg BID at discharge     ##RAD:  ##Allergic rhinitis:   -  Continue Cetrizine 10 mg daily  - Continue Benadryl 25 mg Q6H PRN  - Resume PTA montelukast 10 mg daily at discharge     ##Hypothyroidism:   - Continue PTA Levothyroxine 137 mcg daily      ##Hx of migraine HA:   - Continue PTA Amitriptyline 60 mg at bedtime  - Continue Zolmitriptan 5 mg as needed at onset of headache, increased frequency to twice daily as needed    DVT Prophylaxis: Pneumatic Compression Devices  Code Status: Full Code  Expected discharge:  recommended to prior living arrangement once adequate pain management/ tolerating PO medications.    Daniel Colon MD  Text Page (7am - 6pm, M-F)    Interval History   The patient is complaining of abdominal pain.  She denied any active nausea or vomiting.  Four-point review of systems is otherwise negative.    -Data reviewed today: I reviewed all new labs and imaging results over the last 24 hours. I personally reviewed no images or EKG's today.    Physical Exam   Temp: 97.9  F (36.6  C) Temp src: Oral BP: 106/63 Pulse: 84   Resp: 16 SpO2: 98 % O2 Device: None (Room air)    Vitals:    09/18/21 1705   Weight: 59 kg (130 lb)     Vital Signs with Ranges  Temp:  [97.4  F (36.3  C)-98.5  F (36.9  C)] 97.9  F (36.6  C)  Pulse:  [71-84] 84  Resp:  [16-18] 16  BP: ()/(57-66) 106/63  SpO2:  [96 %-98 %] 98 %  I/O last 3 completed shifts:  In: 350 [P.O.:200; NG/GT:150]  Out: 700 [Emesis/NG output:700]    Constitutional: Awake, alert, cooperative, no apparent distress.  Eyes: Conjunctiva and pupils examined and normal.  HEENT: Moist mucous membranes, normal dentition.  Respiratory: Clear to auscultation bilaterally, no crackles or wheezing.  Cardiovascular: Regular rate and rhythm, normal S1 and S2, and no murmur noted.  GI: Soft, non-distended, non-tender, normal bowel sounds.  Lymph/Hematologic: No anterior cervical or supraclavicular adenopathy.  Skin: No rashes, no cyanosis, no edema.  Musculoskeletal: No joint swelling, erythema or tenderness.  Neurologic:  Cranial nerves 2-12 intact, normal strength and sensation.  Psychiatric: Alert, oriented to person, place and time, no obvious anxiety or depression.    Medications     lactated ringers 75 mL/hr at 09/21/21 1748       acetaminophen  975 mg Oral Q8H     amitriptyline  60 mg Oral or Feeding Tube At Bedtime     azelastine  1 spray Both Nostrils BID     cetirizine  10 mg Oral or Feeding Tube QAM     cyclobenzaprine  10 mg Oral or Feeding Tube TID     famotidine  20 mg Oral QAM     levothyroxine  137 mcg Oral QAM AC     lidocaine 4%   Topical BID     mirtazapine  30 mg Orally disintegrating tablet At Bedtime     montelukast  10 mg Oral At Bedtime     multivitamins w/minerals  15 mL Per Feeding Tube Daily     pantoprazole (PROTONIX) IV  40 mg Intravenous BID     polyethylene glycol  17 g Oral TID     Prucalopride Succinate  2 mg Oral QAM     scopolamine  1 patch Transdermal Q72H    And     scopolamine   Transdermal Q8H     senna-docusate  1-2 tablet Oral QAM       Data   Recent Labs   Lab 09/21/21  0632 09/21/21  0600 09/21/21  0310 09/19/21  0202 09/18/21  1936 09/18/21  1717 09/16/21  1308   WBC 4.9  --   --   --   --  5.4 6.5   HGB 12.5  --   --   --   --  13.6 13.0   MCV 96  --   --   --   --  93 96     --   --   --   --  426 387     --   --   --   --  137  --    POTASSIUM 3.9  --   --   --   --  3.9  --    CHLORIDE 103  --   --   --   --  103  --    CO2 31  --   --   --   --  30  --    BUN 10  --   --   --   --  13  --    CR 0.78  --   --   --   --  0.73  --    ANIONGAP 4  --   --   --   --  4  --    KASSI 9.0  --   --   --   --  9.2  --    GLC 91 91 90   < >  --  95  --    ALBUMIN 3.1*  --   --   --   --  3.8  --    PROTTOTAL 6.5*  --   --   --   --  7.7  --    BILITOTAL 0.5  --   --   --   --  0.3  --    ALKPHOS 107  --   --   --   --  133  --    ALT 66*  --   --   --   --  77*  --    AST 38  --   --   --   --  29  --    LIPASE  --   --   --   --  12*  --   --     < > = values in this interval not  displayed.       No results found for this or any previous visit (from the past 24 hour(s)).

## 2021-09-22 NOTE — PLAN OF CARE
"Shift: 15:00 - 23:30  VS: /63 (BP Location: Right arm)   Pulse 75   Temp 98.1  F (36.7  C) (Oral)   Resp 16   Ht 1.702 m (5' 7\")   Wt 59 kg (130 lb)   SpO2 99%   BMI 20.36 kg/m    Pain: Patient c/o pain /10. Dilaudid given as prescribed with a decrease in pain to none - 2/10 with movement.  Neuro: AOx4, able to make needs known.  Cardiac: WDL, no CP noted.  Respiratory: WDL, no SoB noted.  Diet/Appetite:  Clear liquids, NPO at midnight. Patient's last BG was 95.  /GI: Voiding normally. Patient did not have a BM this shift. Last BM 3 days ago.   LDA's: PIV in left forearm running LR 75ml.  Skin: No new deficits. No redness around the Gtube.  Activity: Independent. Patient up to shower this evening.     Plan: NPO at midnight for procedure tomorrow afternoon.     "

## 2021-09-22 NOTE — PROVIDER NOTIFICATION
09/22/21 1049   Arrived From   Arrived From OR   Mode of Transport Cart     Patient accompanied by OR RN, CRNA and sCRNA.   Identity verified, bedside report received.

## 2021-09-22 NOTE — PROGRESS NOTES
[facility]    Medicine Progress Note - Hospitalist Service, Gold 10       Date of Admission:  9/18/2021    Assessment & Plan         56-year-old female patient presented to the hospital with abdominal pain, nausea, and vomiting.  The patient has history of hypothyroidism, GERD, allergic rhinitis, migraines, hiatal hernia, lumbar DDD, pituitary adenoma s/p resection, IgG4 pseudotumor of liver, chronic pancreatitis s/p TPAIT, gastroparesis, FTT, admitted 9/7-9/12/21 for PEG tube replacement by percutaneous GJ tube (9/7/21), complicated by displacement of the GJ tube, ultimately undergoing EGD with GI for replacement of PEG J-tube with T tag gastropexy on 9/10/21. GJ tube replaced this admission by GI on 9/22.     Today:   -GJ replaced by GI today  -Resume tube feeds (plant-based regimen as ordered)   -Continue bowel regimen  -Monitor abdominal spasms, discussed with GI, and these may persist despite GJ exchange    -Plan to wean narcotics, particularly IV narcotics, in coming days     Acute abdominal pain secondary to acute inflammation around PEG tube tract and spasm pains due to duodenal peristalsis following on G-tube  This is the main problem that led to this admission.  The patient has history of known gastroparesis, chronic pancreatitis s/p pancreatectomy with islet cell transplant, s/p percutaneous gastrojejunostomy tube placement on 9/7/2021, s/p replacement of percutaneous gastrojejunostomy tube with T tag gastropexy on 9/10/2021.   On admission, the patient had unremarkable CT abdomen pelvis with contrast with no acute changes.  The patient received intravenous narcotics and antiemetics.  Given failure of the patient's symptoms to manuel with management in ED observation, patient was admitted to internal medicine service.  GJ tube replaced due to being clogged on 9/22 by GI.     -Scheduled acetaminophen at 1 g every 8 hours based on recommendations of pain  management service  -Started twice daily use of lidocaine cream at enteral tube site based on recommendations of pain management service  -Continue intravenous PPI twice daily  -Continue home dose of famotidine 20 mg daily  -Continue Phenergan IV as needed  -Resumed J-tube feeding  -Discontinued IV fluids  -Increased MiraLAX to twice daily days recommendations of gastroenterology  -Continue Senokot S1-2 tablets BID  -Resumed Prucalopride 2 mg every morning  -Continue Flexeril at 10 mg 3 times daily  -Continue Dilaudid 2 to 4 mg oral every 3 hours as needed for today  -Plan to space out Dilaudid 2 to 4 mg 2 every 4 hours followed by every 6 hours as needed in order to improve mood gastric motility  -Continue Dilaudid 0.3 mg IV every 4 hours as needed for moderate to severe pain  -The patient did not derive any benefit from gabapentin or pregabalin priorly  -Discuss with gastroenterology about weaning amitriptyline given its detrimental side effect on delaying gastric motility, based on Nathan Lenz et al W July 2013  -Continue tube feeding  -Continue with clear liquid diet  -Highly appreciate the input of Dr. Park and gastroenterology service     Depression, POA  -The patient was seen by clinical psychologist  -Continue amitriptyline 60 mg at bedtime for now  -Mirtazapine started this admission   -Consulted with inpatient psychiatry service     Chronic medical problems  Hx of IgG4 pseudotumor: Mild LFT abnormality with mildly elevated IgG4. Liver lesion previously biopsied with noticeable proliferation and acute and chronic inflammation and fibrosis of liver with focally increased IgG4-positive plasma cells consistent with IgG4 pseudotumor. Followed by hepatology.   - Outpatient follow up with GI      GERD:   - Resume famotidine po 20 mg BID, omeprazole po 40 mg BID at discharge     RAD:  Allergic rhinitis:   - Continue Cetrizine 10 mg daily  - Continue Benadryl 25 mg Q6H PRN  - Resume PTA montelukast 10 mg  daily at discharge     Hypothyroidism:   - Continue PTA Levothyroxine 137 mcg daily      Hx of migraine HA:   - Continue PTA Amitriptyline 60 mg at bedtime  - Continue Zolmitriptan 5 mg as needed at onset of headache, increased frequency to twice daily as needed       Diet: Adult Formula Drip Feeding: Continuous [employer] Peptide 1.5; Jejunostomy; Goal Rate: 45; mL/hr; Medication - Feeding Tube Flush Frequency: At least 15-30 mL water before and after medication administration and with tube clogging; Change 1 RELIZO...  Clear Liquid Diet    DVT Prophylaxis: Ambulate every shift  Piedra Catheter: Not present  Central Lines: None  Code Status: Full Code      Disposition Plan   Expected discharge: 09/22/2021   recommended to prior living arrangement once adequate pain management/ tolerating PO medications.     The patient's care was discussed with the Patient and Patient's Family.    Yvette Rich DO  Hospitalist Service, [address]  Securely message with the Vocera Web Console (learn more here)  Text page via [facility] Paging/Directory  Please see sign in/sign out for up to date coverage information    Clinically Significant Risk Factors Present on Admission               ______________________________________________________________________    Interval History   No overnight events reported.  Feeding tube unable to be unclogged, replaced by GI today in OR.   Patient seen after the procedure, tolerated well. However, she developed a spasm during interview and exam similar to what she was experiencing prior to replacement. Still with abdominal pain, requiring IV and PO pain medications.   No bowel movement since Saturday 9/18 she reports.     Data reviewed today: I reviewed all medications, new labs and imaging results over the last 24 hours. I personally reviewed no images or EKG's today.    Physical Exam   Vital Signs: Temp: 97.8  F (36.6  C) Temp src: Oral BP:  115/74 Pulse: 90   Resp: 18 SpO2: 97 % O2 Device: None (Room air) Oxygen Delivery: 2 LPM  Weight: 130 lbs 0 oz  Constitutional: no apparent distress, pleasant and cooperative   Cardiovascular: regular rate and rhythm, normal S1 and S2, no murmurs, rubs, or gallops noted, no bilateral lower extremity edema   Respiratory: clear to auscultation bilaterally, no wheezing, rales, or rhonchi, normal work of breathing   GI: abdomen soft, new feeding tube in place site appears clean and dry, some tenderness to palpation around site where she reports spasm going on, non distended, bowel sounds present   Neuro: alert and oriented, no gross focal deficits noted     Data   Recent Labs   Lab 09/22/21  1440 09/22/21  1351 09/22/21  1327 09/22/21  0619 09/22/21  0603 09/21/21  2145 09/21/21  0632 09/19/21  0202 09/18/21  1936 09/18/21  1717   0000   WBC  --   --   --   --  5.5  --  4.9  --   --  5.4  --    HGB  --   --   --   --  13.0  --  12.5  --   --  13.6  --    MCV  --   --   --   --  95  --  96  --   --  93  --    PLT  --   --   --   --  441  --  419  --   --  426  --    NA  --   --   --   --  139  --  138  --   --  137  --    POTASSIUM  --   --   --   --  3.6  --  3.9  --   --  3.9  --    CHLORIDE  --   --   --   --  102  --  103  --   --  103  --    CO2  --   --   --   --  34*  --  31  --   --  30  --    BUN  --   --   --   --  9  --  10  --   --  13  --    CR  --   --   --   --  0.81  --  0.78  --   --  0.73  --    ANIONGAP  --   --   --   --  3  --  4  --   --  4  --    KASSI  --   --   --   --  9.4  --  9.0  --   --  9.2  --    * 85 64*   < > 82   < > 91   < >  --  95   < >   ALBUMIN  --   --   --   --  3.4  --  3.1*  --   --  3.8   < >   PROTTOTAL  --   --   --   --  7.2  --  6.5*  --   --  7.7   < >   BILITOTAL  --   --   --   --  0.6  --  0.5  --   --  0.3   < >   ALKPHOS  --   --   --   --  116  --  107  --   --  133   < >   ALT  --   --   --   --  74*  --  66*  --   --  77*   < >   AST  --   --   --   --  38   --  38  --   --  29   < >   LIPASE  --   --   --   --   --   --   --   --  12*  --   --     < > = values in this interval not displayed.     Recent Results (from the past 24 hour(s))   XR Surgery GAMA Fluoro L/T 5 Min w Stills    Narrative    This exam was marked as non-reportable because it will not be read by a   radiologist or a [place] non-radiologist provider.           Medications     lactated ringers       lactated ringers 75 mL/hr at 09/22/21 1325       acetaminophen  975 mg Oral Q8H     amitriptyline  60 mg Oral or Feeding Tube At Bedtime     azelastine  1 spray Both Nostrils BID     cetirizine  10 mg Oral or Feeding Tube QAM     cyclobenzaprine  10 mg Oral or Feeding Tube TID     famotidine  20 mg Oral QAM     levothyroxine  137 mcg Oral QAM AC     lidocaine 4%   Topical BID     mirtazapine  30 mg Orally disintegrating tablet At Bedtime     montelukast  10 mg Oral At Bedtime     multivitamins w/minerals  15 mL Per Feeding Tube Daily     pantoprazole  40 mg Oral BID AC     polyethylene glycol  17 g Oral TID     Prucalopride Succinate  2 mg Oral QAM     scopolamine  1 patch Transdermal Q72H    And     scopolamine   Transdermal Q8H     senna-docusate  1-2 tablet Oral QAM     sodium chloride (PF)  3 mL Intracatheter Q8H

## 2021-09-22 NOTE — ANESTHESIA CARE TRANSFER NOTE
Patient: Dinora Mcghee    Procedure(s):  REPLACEMENT, GASTROJEJUNOSTOMY TUBE, PERCUTANEOUS    Diagnosis: Malfunction of gastrostomy tube (H) [K94.23]  Diagnosis Additional Information: No value filed.    Anesthesia Type:   MAC     Note:    Oropharynx: oropharynx clear of all foreign objects and spontaneously breathing  Level of Consciousness: drowsy  Oxygen Supplementation: nasal cannula  Level of Supplemental Oxygen (L/min / FiO2): 2  Independent Airway: airway patency satisfactory and stable  Dentition: dentition unchanged  Vital Signs Stable: post-procedure vital signs reviewed and stable  Report to RN Given: handoff report given  Patient transferred to: PACU    Handoff Report: Identifed the Patient, Identified the Reponsible Provider, Reviewed the pertinent medical history, Discussed the surgical course, Reviewed Intra-OP anesthesia mangement and issues during anesthesia, Set expectations for post-procedure period and Allowed opportunity for questions and acknowledgement of understanding      Vitals:  Vitals Value Taken Time   /61 09/22/21 1039   Temp     Pulse 80 09/22/21 1041   Resp     SpO2 100 % 09/22/21 1041   Vitals shown include unvalidated device data.    Electronically Signed By: NATALY Leslie CRNA  September 22, 2021  10:42 AM

## 2021-09-22 NOTE — OP NOTE
Upper GI Endoscopy 09/22/2021  9:19 AM [address] [place] [address] [phone]     Endoscopy Department   _______________________________________________________________________________   Patient Name: Dinora Mcghee           Procedure Date: 9/22/2021 9:19 AM   MRN: [id number]                       Account Number: [id number]   YOB: 1965              Admit Type: Outpatient   Age: 56                               Room: OR   Gender: Female                        Note Status: Finalized   Attending MD: Zackery Montoya MD   Total Sedation Time:   _______________________________________________________________________________       Procedure:           Upper GI endoscopy   Indications:         Replace PEG-J tube due to malfunctioning gastrostomy tube   Providers:           Judit Paz MD, Zackery Montoya MD   Patient Profile:     Ms Mcghee is a 55yo woman post TPIAT requiring a GJ for                        nutrition who presents with a clogged, malfunctioning                        device. She now proceeds to upper endoscopy for exchange.   Referring MD:        Mandeep Park MD   Medicines:           General Anesthesia   Complications:       No immediate complications.   _______________________________________________________________________________   Procedure:           Pre-Anesthesia Assessment:                        - Prior to the procedure, a History and Physical was                        performed, and patient medications and allergies were                        reviewed. The patient is competent. The risks and                        benefits of the procedure and the sedation options and                        risks were discussed with the patient. All questions                        were answered and informed consent was obtained. Patient                        identification and proposed procedure were verified  by                        the nurse in the pre-procedure area. Mental Status                        Examination: alert and oriented. Airway Examination:                        Mallampati Class II (the uvula but not tonsillar pillars                        visualized). Respiratory Examination: clear to                        auscultation. CV Examination: normal. ASA Grade                        Assessment: III - A patient with severe systemic                        disease. After reviewing the risks and benefits, the                        patient was deemed in satisfactory condition to undergo                        the procedure. The anesthesia plan was to use general                        anesthesia. Immediately prior to administration of                        medications, the patient was re-assessed for adequacy to                        receive sedatives. The heart rate, respiratory rate,                        oxygen saturations, blood pressure, adequacy of                        pulmonary ventilation, and response to care were                        monitored throughout the procedure. The physical status                        of the patient was re-assessed after the procedure.                        After obtaining informed consent, the endoscope was                        passed under direct vision. Throughout the procedure,                        the patient's blood pressure, pulse, and oxygen                        saturations were monitored continuously. The pediatric                        gastroscope was introduced through the mouthand                        gastrostomy and advanced to the jejunum. The upper GI                        endoscopy was accomplished without difficulty. The                        patient tolerated the procedure well.                                                                                     Findings:        The patient was supine throughout.  films suggested  "the existing        tube may have been malpositioned. The GJ tube was removed allowing for        passage of the pediatric gastroscope through a mature gastrostomy tract.        The stomach was unremarkable and the duodenojejunostomy was widely        patent. The jejunum was traversed to the jejunojejunostomy which was        also healthy and patent as was the jejunum. An 0.035\" Glidewire was        passed through the scope to the feeding limb and the endoscope        exchanged. Over this wire an 18F 57cm one piece weighted        gastrojejunostomy tube was positioned without gastric curl (confirmed by        passage of the endoscope per os, with an unremarkable esophagus and        stomach appreciated) and the tip in the feeding jejunum.                                                                                     Impression:          - TPIAT anatomy with widely patent duodenojejunostomy                        and jejunojejunostomy                        - Uncomplicated exchange of clogged one piece weight GJ                        with a well positioned 18F 57cm one piece GJ without                        gastric curl and tip into the feeding limb   Recommendation:      - General anesthesia recovery with return to the floor                        when appropriate                        - J tube for feeds only and these sessions must be                        followed by water flushes; this may be used without delay                        - G tube may be used for medications without delay                        - Activity and oral diet as tolerated may resume as may                        all medications                        - The findings and recommendations were discussed with                        the patient and their family                                                                                       electronically signed by ANDREI Montoya        "

## 2021-09-22 NOTE — ANESTHESIA PROCEDURE NOTES
Airway       Patient location during procedure: OR       Procedure Start/Stop Times: 9/22/2021 9:26 AM  Staff -        CRNA: Marielle Devries APRN CRNA       Performed By: CRNA  Consent for Airway        Urgency: elective  Indications and Patient Condition       Indications for airway management: andrez-procedural       Induction type:intravenous       Mask difficulty assessment: 1 - vent by mask    Final Airway Details       Final airway type: endotracheal airway       Successful airway: ETT - single and Oral  Endotracheal Airway Details        ETT size (mm): 6.5       Cuffed: yes       Cuff volume (mL): 8       Successful intubation technique: direct laryngoscopy       DL Blade Type: Pino 2       Grade View of Cords: 1       Adjucts: stylet       Position: Right       Measured from: gums/teeth       Secured at (cm): 22       Bite block used: None    Post intubation assessment        Placement verified by: capnometry and chest rise        Number of attempts at approach: 1       Secured with: pink tape       Ease of procedure: easy       Dentition: Intact and Unchanged

## 2021-09-22 NOTE — PROGRESS NOTES
Cross cover    Patient asking to speak to provider to review plan of care. This provider relayed pertinent parts of patient plan to bedside nursing staff per sign out from primary provider and brief chart review of GI note. Due to high hospital acuity, cannot come at this time but will touch base later if there is a window of time if patient still wishes to speak to provider.     Dr. Tania Greenfield  Internal Medicine/Pediatrics      This note was created using voice recognition software and may contain minor errors.

## 2021-09-22 NOTE — PROGRESS NOTES
"Shift 0317-2042    /74 (BP Location: Right arm)   Pulse 90   Temp 97.8  F (36.6  C) (Oral)   Resp 18   Ht 1.702 m (5' 7\")   Wt 59 kg (130 lb)   SpO2 97%   BMI 20.36 kg/m      Reason for admission: Abd pain, G/J tube replacement   Activity: Independent   Pain: Pt reports 7-8/10 abd pain. Pt receiving PO and IV dilaudid with relief  Neuro: A&Ox4   Cardiac: WDL. Denies c/p  Respiratory: WDL. Denies SOB   GI/: Pain at PEG insertion site. BG dropped to 46, pt was asymptomatic and stabilized >100 with orange juice. Tube feeding started at 20 ml/hr 3:45p d/t pump malfunction. Can be increased to 45 ml/hr (goal rate) at 7:45pm if no discomfort. Using relizorb cartridges per orders.   Extremities: WDL  Diet: Clear liquid and tolerating well  Lines: PIV, forearm; saline locked.  Labs/imaging/Consults: Gold 10   Discharge Plan: In progress      * Dressing to PEG site changed with critic aid paste per order   "

## 2021-09-22 NOTE — ANESTHESIA POSTPROCEDURE EVALUATION
Patient: Dinora Mcghee    Procedure(s):  REPLACEMENT, GASTROJEJUNOSTOMY TUBE, PERCUTANEOUS    Diagnosis:Malfunction of gastrostomy tube (H) [K94.23]  Diagnosis Additional Information: No value filed.    Anesthesia Type:  MAC    Note:  Disposition: Outpatient ((in observation unit))   Postop Pain Control: Uneventful            Sign Out: Well controlled pain   PONV: No   Neuro/Psych: Uneventful            Sign Out: Acceptable/Baseline neuro status   Airway/Respiratory: Uneventful            Sign Out: Acceptable/Baseline resp. status   CV/Hemodynamics: Uneventful            Sign Out: Acceptable CV status; No obvious hypovolemia; No obvious fluid overload   Other NRE: NONE   DID A NON-ROUTINE EVENT OCCUR? No           Last vitals:  Vitals Value Taken Time   /67 09/22/21 1035   Temp     Pulse 75 09/22/21 1040   Resp     SpO2 100 % 09/22/21 1040   Vitals shown include unvalidated device data.    Electronically Signed By: Estrada Jimenez MD  September 22, 2021  10:41 AM

## 2021-09-23 ENCOUNTER — APPOINTMENT (OUTPATIENT)
Dept: GENERAL RADIOLOGY | Facility: CLINIC | Age: 56
End: 2021-09-23
Attending: INTERNAL MEDICINE
Payer: COMMERCIAL

## 2021-09-23 LAB
ALBUMIN SERPL-MCNC: 3.2 G/DL (ref 3.4–5)
ALP SERPL-CCNC: 122 U/L (ref 40–150)
ALT SERPL W P-5'-P-CCNC: 63 U/L (ref 0–50)
ANION GAP SERPL CALCULATED.3IONS-SCNC: 5 MMOL/L (ref 3–14)
AST SERPL W P-5'-P-CCNC: 35 U/L (ref 0–45)
BASOPHILS # BLD AUTO: 0 10E3/UL (ref 0–0.2)
BASOPHILS NFR BLD AUTO: 0 %
BILIRUB SERPL-MCNC: 0.4 MG/DL (ref 0.2–1.3)
BUN SERPL-MCNC: 8 MG/DL (ref 7–30)
CALCIUM SERPL-MCNC: 9 MG/DL (ref 8.5–10.1)
CHLORIDE BLD-SCNC: 102 MMOL/L (ref 94–109)
CO2 SERPL-SCNC: 32 MMOL/L (ref 20–32)
CREAT SERPL-MCNC: 0.55 MG/DL (ref 0.52–1.04)
EOSINOPHIL # BLD AUTO: 0.2 10E3/UL (ref 0–0.7)
EOSINOPHIL NFR BLD AUTO: 4 %
ERYTHROCYTE [DISTWIDTH] IN BLOOD BY AUTOMATED COUNT: 13.4 % (ref 10–15)
GFR SERPL CREATININE-BSD FRML MDRD: >90 ML/MIN/1.73M2
GLUCOSE BLD-MCNC: 143 MG/DL (ref 70–99)
GLUCOSE BLDC GLUCOMTR-MCNC: 108 MG/DL (ref 70–99)
GLUCOSE BLDC GLUCOMTR-MCNC: 115 MG/DL (ref 70–99)
GLUCOSE BLDC GLUCOMTR-MCNC: 118 MG/DL (ref 70–99)
GLUCOSE BLDC GLUCOMTR-MCNC: 131 MG/DL (ref 70–99)
GLUCOSE BLDC GLUCOMTR-MCNC: 135 MG/DL (ref 70–99)
HCT VFR BLD AUTO: 40.1 % (ref 35–47)
HGB BLD-MCNC: 13.2 G/DL (ref 11.7–15.7)
IMM GRANULOCYTES # BLD: 0 10E3/UL
IMM GRANULOCYTES NFR BLD: 0 %
LACTATE SERPL-SCNC: 0.7 MMOL/L (ref 0.7–2)
LYMPHOCYTES # BLD AUTO: 2.2 10E3/UL (ref 0.8–5.3)
LYMPHOCYTES NFR BLD AUTO: 33 %
MAGNESIUM SERPL-MCNC: 2 MG/DL (ref 1.6–2.3)
MCH RBC QN AUTO: 31.4 PG (ref 26.5–33)
MCHC RBC AUTO-ENTMCNC: 32.9 G/DL (ref 31.5–36.5)
MCV RBC AUTO: 95 FL (ref 78–100)
MONOCYTES # BLD AUTO: 0.9 10E3/UL (ref 0–1.3)
MONOCYTES NFR BLD AUTO: 14 %
NEUTROPHILS # BLD AUTO: 3.3 10E3/UL (ref 1.6–8.3)
NEUTROPHILS NFR BLD AUTO: 49 %
NRBC # BLD AUTO: 0 10E3/UL
NRBC BLD AUTO-RTO: 0 /100
PHOSPHATE SERPL-MCNC: 3.9 MG/DL (ref 2.5–4.5)
PLATELET # BLD AUTO: 457 10E3/UL (ref 150–450)
POTASSIUM BLD-SCNC: 3.9 MMOL/L (ref 3.4–5.3)
PROT SERPL-MCNC: 6.8 G/DL (ref 6.8–8.8)
RBC # BLD AUTO: 4.21 10E6/UL (ref 3.8–5.2)
SODIUM SERPL-SCNC: 139 MMOL/L (ref 133–144)
TROPONIN I SERPL-MCNC: <0.015 UG/L (ref 0–0.04)
WBC # BLD AUTO: 6.6 10E3/UL (ref 4–11)

## 2021-09-23 PROCEDURE — 120N000002 HC R&B MED SURG/OB UMMC

## 2021-09-23 PROCEDURE — 84100 ASSAY OF PHOSPHORUS: CPT | Performed by: INTERNAL MEDICINE

## 2021-09-23 PROCEDURE — 250N000013 HC RX MED GY IP 250 OP 250 PS 637: Performed by: INTERNAL MEDICINE

## 2021-09-23 PROCEDURE — 36415 COLL VENOUS BLD VENIPUNCTURE: CPT | Performed by: INTERNAL MEDICINE

## 2021-09-23 PROCEDURE — 93010 ELECTROCARDIOGRAM REPORT: CPT | Performed by: INTERNAL MEDICINE

## 2021-09-23 PROCEDURE — 250N000009 HC RX 250: Performed by: INTERNAL MEDICINE

## 2021-09-23 PROCEDURE — 85004 AUTOMATED DIFF WBC COUNT: CPT | Performed by: INTERNAL MEDICINE

## 2021-09-23 PROCEDURE — 258N000003 HC RX IP 258 OP 636: Performed by: INTERNAL MEDICINE

## 2021-09-23 PROCEDURE — 87040 BLOOD CULTURE FOR BACTERIA: CPT | Performed by: INTERNAL MEDICINE

## 2021-09-23 PROCEDURE — 84484 ASSAY OF TROPONIN QUANT: CPT | Performed by: INTERNAL MEDICINE

## 2021-09-23 PROCEDURE — 83605 ASSAY OF LACTIC ACID: CPT | Performed by: INTERNAL MEDICINE

## 2021-09-23 PROCEDURE — 80053 COMPREHEN METABOLIC PANEL: CPT | Performed by: INTERNAL MEDICINE

## 2021-09-23 PROCEDURE — 71046 X-RAY EXAM CHEST 2 VIEWS: CPT | Mod: 26 | Performed by: RADIOLOGY

## 2021-09-23 PROCEDURE — 93005 ELECTROCARDIOGRAM TRACING: CPT

## 2021-09-23 PROCEDURE — 71046 X-RAY EXAM CHEST 2 VIEWS: CPT

## 2021-09-23 PROCEDURE — 99233 SBSQ HOSP IP/OBS HIGH 50: CPT | Performed by: INTERNAL MEDICINE

## 2021-09-23 PROCEDURE — 83735 ASSAY OF MAGNESIUM: CPT | Performed by: INTERNAL MEDICINE

## 2021-09-23 PROCEDURE — 250N000011 HC RX IP 250 OP 636: Performed by: INTERNAL MEDICINE

## 2021-09-23 RX ORDER — AMITRIPTYLINE HYDROCHLORIDE 10 MG/1
40 TABLET ORAL AT BEDTIME
Qty: 120 TABLET | Refills: 0 | Status: CANCELLED | OUTPATIENT
Start: 2021-09-23

## 2021-09-23 RX ORDER — NALOXONE HYDROCHLORIDE 0.4 MG/ML
0.2 INJECTION, SOLUTION INTRAMUSCULAR; INTRAVENOUS; SUBCUTANEOUS
Status: DISCONTINUED | OUTPATIENT
Start: 2021-09-23 | End: 2021-09-24 | Stop reason: HOSPADM

## 2021-09-23 RX ORDER — NALOXONE HYDROCHLORIDE 0.4 MG/ML
0.4 INJECTION, SOLUTION INTRAMUSCULAR; INTRAVENOUS; SUBCUTANEOUS
Status: DISCONTINUED | OUTPATIENT
Start: 2021-09-23 | End: 2021-09-24 | Stop reason: HOSPADM

## 2021-09-23 RX ORDER — BISACODYL 10 MG
10 SUPPOSITORY, RECTAL RECTAL DAILY PRN
Status: DISCONTINUED | OUTPATIENT
Start: 2021-09-23 | End: 2021-09-24 | Stop reason: HOSPADM

## 2021-09-23 RX ORDER — CALCIUM CARBONATE 500 MG/1
1000 TABLET, CHEWABLE ORAL 3 TIMES DAILY PRN
Status: DISCONTINUED | OUTPATIENT
Start: 2021-09-23 | End: 2021-09-24 | Stop reason: HOSPADM

## 2021-09-23 RX ADMIN — LIDOCAINE: 40 CREAM TOPICAL at 09:08

## 2021-09-23 RX ADMIN — PROMETHAZINE HYDROCHLORIDE 12.5 MG: 25 INJECTION INTRAMUSCULAR; INTRAVENOUS at 02:41

## 2021-09-23 RX ADMIN — SCOPALAMINE 1 PATCH: 1 PATCH, EXTENDED RELEASE TRANSDERMAL at 20:13

## 2021-09-23 RX ADMIN — Medication 15 ML: at 09:04

## 2021-09-23 RX ADMIN — BISACODYL 10 MG: 10 SUPPOSITORY RECTAL at 09:32

## 2021-09-23 RX ADMIN — LIDOCAINE: 40 CREAM TOPICAL at 20:14

## 2021-09-23 RX ADMIN — FAMOTIDINE 20 MG: 20 TABLET, FILM COATED ORAL at 09:03

## 2021-09-23 RX ADMIN — DOCUSATE SODIUM 50 MG AND SENNOSIDES 8.6 MG 1 TABLET: 8.6; 5 TABLET, FILM COATED ORAL at 09:03

## 2021-09-23 RX ADMIN — SODIUM CHLORIDE, POTASSIUM CHLORIDE, SODIUM LACTATE AND CALCIUM CHLORIDE 500 ML: 600; 310; 30; 20 INJECTION, SOLUTION INTRAVENOUS at 16:33

## 2021-09-23 RX ADMIN — PROMETHAZINE HYDROCHLORIDE 25 MG: 25 TABLET ORAL at 09:50

## 2021-09-23 RX ADMIN — PANTOPRAZOLE SODIUM 40 MG: 40 TABLET, DELAYED RELEASE ORAL at 09:03

## 2021-09-23 RX ADMIN — LEVOTHYROXINE SODIUM 137 MCG: 0.14 TABLET ORAL at 09:05

## 2021-09-23 RX ADMIN — AMITRIPTYLINE HYDROCHLORIDE 60 MG: 10 TABLET, FILM COATED ORAL at 22:23

## 2021-09-23 RX ADMIN — DOCUSATE SODIUM 50 MG AND SENNOSIDES 8.6 MG 2 TABLET: 8.6; 5 TABLET, FILM COATED ORAL at 20:13

## 2021-09-23 RX ADMIN — PROMETHAZINE HYDROCHLORIDE 25 MG: 25 TABLET ORAL at 22:32

## 2021-09-23 RX ADMIN — PANTOPRAZOLE SODIUM 40 MG: 40 TABLET, DELAYED RELEASE ORAL at 16:22

## 2021-09-23 RX ADMIN — AZELASTINE HYDROCHLORIDE 1 SPRAY: 137 SPRAY, METERED NASAL at 09:02

## 2021-09-23 RX ADMIN — POLYETHYLENE GLYCOL 3350 17 G: 17 POWDER, FOR SOLUTION ORAL at 20:13

## 2021-09-23 RX ADMIN — CYCLOBENZAPRINE HYDROCHLORIDE 10 MG: 5 TABLET, FILM COATED ORAL at 20:14

## 2021-09-23 RX ADMIN — PROCHLORPERAZINE MALEATE 10 MG: 10 TABLET ORAL at 20:13

## 2021-09-23 RX ADMIN — AZELASTINE HYDROCHLORIDE 1 SPRAY: 137 SPRAY, METERED NASAL at 20:13

## 2021-09-23 RX ADMIN — POLYETHYLENE GLYCOL 3350 17 G: 17 POWDER, FOR SOLUTION ORAL at 09:03

## 2021-09-23 RX ADMIN — ACETAMINOPHEN 975 MG: 325 TABLET, FILM COATED ORAL at 06:38

## 2021-09-23 RX ADMIN — MONTELUKAST 10 MG: 10 TABLET, FILM COATED ORAL at 22:23

## 2021-09-23 RX ADMIN — CETIRIZINE HYDROCHLORIDE 10 MG: 10 TABLET, FILM COATED ORAL at 09:03

## 2021-09-23 RX ADMIN — CYCLOBENZAPRINE HYDROCHLORIDE 10 MG: 5 TABLET, FILM COATED ORAL at 09:03

## 2021-09-23 RX ADMIN — ACETAMINOPHEN 975 MG: 325 TABLET, FILM COATED ORAL at 22:23

## 2021-09-23 RX ADMIN — HYDROMORPHONE HYDROCHLORIDE 2 MG: 2 TABLET ORAL at 09:49

## 2021-09-23 ASSESSMENT — ACTIVITIES OF DAILY LIVING (ADL)
ADLS_ACUITY_SCORE: 8

## 2021-09-23 NOTE — PROGRESS NOTES
"Bisacodyl supp administered earlier. Pt has reported of 2 watery stools. Has refused second Miralax and reports she will take scheduled Miralax at 2000. The Pt in w/c and went up to cafeteria for a while, appears pleasant and in good spirits with hubby presence. Pt has denies pain at this time. Dr. Park here and wants GT to LIS. Pt GT was to gravity and 400 ml green content output and then attached to LIS. Pt reports feeling much better after suction was attached. TF infusing at 45 ml.   /74 (BP Location: Right arm)   Pulse 85   Temp 98.3  F (36.8  C) (Oral)   Resp 18   Ht 1.702 m (5' 7\")   Wt 59 kg (130 lb)   SpO2 100%   BMI 20.36 kg/m      "

## 2021-09-23 NOTE — PROGRESS NOTES
Brief GI note:    Patient seen by Dr. Park. Fluid filled in stomach clinically, and also seen in CXR.     Plan --G-tube to low intermittent suction, ordered placed as nursing text order.    Maren Flores MD  Gastroenterology/Hepatology Fellow  Page: [phone]

## 2021-09-23 NOTE — PROGRESS NOTES
"Shift 6142-6069.     /66 (BP Location: Right arm)   Pulse 77   Temp 98.3  F (36.8  C) (Oral)   Resp 16   Ht 1.702 m (5' 7\")   Wt 59 kg (130 lb)   SpO2 95%   BMI 20.36 kg/m      Reason for admission: Abd pain, GJ tube replacement.   Activity: Independent.    Pain: Pt reports 5/10 abd pain. Pt receiving PO dilaudid with relief.   Neuro: A&Ox4. Pt reported feeling \"hungover\" in the middle of the night. Pt reported having a migraine. Zomig prn was given.   Cardiac: WDL. Denies c/p.  Respiratory: Pt reports SOB.   GI/: New GJ tube was placed. Pt reporting pain at site that is managed with prn pain medication. A new Relizorb was changed at 0345. Current rate of [employer] Peptide 1.5 TF is 65 ml/hr beginning at 0050. Per R.D's note, pt should be at 65 ml/hr x 16 hrs (change relizorb q8 hr) therefore rate should be increased to 75 ml/hr at 1650 on 9/23. Relizorb last change was at 0345, therefore next change would be at 1145 on 9/23. Pt's current TF is at 32 ml/hr with start of third carton (pls see prev note).  Extremities: Hands are swollen (non-pitting). Feet are slightly swollen.   Diet: Clears, ADAT. Tube feedings ([employer] Peptide 1.5). Total of 650 ml was given since new tube placement.   Lines: PIV L, SL.   Labs/imaging/Consults: Gold 10.   Discharge Plan: In progress.     "

## 2021-09-23 NOTE — PROGRESS NOTES
"[facility]    Medicine Progress Note - Hospitalist Service, Gold 10       Date of Admission:  9/18/2021    Assessment & Plan         56-year-old female patient presented to the hospital with abdominal pain, nausea, and vomiting.  The patient has history of hypothyroidism, GERD, allergic rhinitis, migraines, hiatal hernia, lumbar DDD, pituitary adenoma s/p resection, IgG4 pseudotumor of liver, chronic pancreatitis s/p TPAIT, gastroparesis, FTT, admitted 9/7-9/12/21 for PEG tube replacement by percutaneous GJ tube (9/7/21), complicated by displacement of the GJ tube, ultimately undergoing EGD with GI for replacement of PEG J-tube with T tag gastropexy on 9/10/21.   She presented this admission 9/19 for worsening abdominal pain at enteric tube site. GJ tube found to be clogged and replaced this admission by GI on 9/22. She continues to have ongoing, poorly controlled pain.     Today:   -Feeling \"unwell\" this morning - EKG, trop, lactate, CXR unremarkable. Borderline positive orthostatics, given 500cc bolus. Blood cultures sent.   -Hold mirtazapine, should it have contributed to \"loopy\" feeling this morning   -Stop IV dilaudid as may worsen her \"loopy\" feeling   -Increased bowel regimen - suppository, enema today   -Taper PO dilaudid as able in coming days     Acute on Chronic Abdominal Pain - ongoing   GJ tube with dependence on enteral feeding   Gastroparesis  Chronic Pancreatitis s/p pancreatectomy with islet cell transplant   In setting of  known gastroparesis, chronic pancreatitis s/p pancreatectomy with islet cell transplant, s/p percutaneous gastrojejunostomy tube placement on 9/7/2021, s/p replacement of percutaneous gastrojejunostomy tube with T tag gastropexy on 9/10/2021. Complicated by constipation.   On admission, the patient had unremarkable CT abdomen pelvis with contrast with no acute changes.  GJ tube replaced due to being clogged on 9/22 by GI.  -GI " "consulted, appreciate assistance   -Seen by pain management service this admission as well   -Pain/nausea control: scheduled acetaminophen, lidocaine cream, PPI BID, PTA famotidine, PRN PO dilaudid (minimize narcotic avoid IV unless absolutely necessary, as can worsen gastroparesis), flexaril 10mg TID for spasms, PRN antiemetics, PTA scopolamine   -Bowel regimen: BID miralax, BID senna, PRN suppository and enema    -Tube feeds changed to plant-based regimen per GI recs this admission, continue TF at this time   -Gastroparesis: Continue PTA Prucalopride 2 mg every morning  -Clear liquid diet as tolerated      Depression  -Continue PTA amitryptyline   -Attempted to start mirtazapine this admission, however patient reported \"loopiness\" the second morning after taking it so it was discontinued   -Health psychology and psychiatry have has seen her      Chronic medical problems  Hx of IgG4 pseudotumor  Mild LFT abnormality with mildly elevated IgG4. Liver lesion previously biopsied with noticeable proliferation and acute and chronic inflammation and fibrosis of liver with focally increased IgG4-positive plasma cells consistent with IgG4 pseudotumor. Followed by hepatology.   - Outpatient follow up with GI      GERD  - Resume famotidine po 20 mg BID, omeprazole po 40 mg BID at discharge     RAD:  Allergic rhinitis   - Continue Cetrizine 10 mg daily  - Continue Benadryl 25 mg Q6H PRN  - Resume PTA montelukast 10 mg daily at discharge     Hypothyroidism  - Continue PTA Levothyroxine 137 mcg daily      Hx of migraine HA  - Continue PTA Amitriptyline 60 mg at bedtime  - Continue Zolmitriptan 5 mg as needed at onset of headache, increased frequency to twice daily as needed       Diet: Adult Formula Drip Feeding: Continuous [employer] Peptide 1.5; Jejunostomy; Goal Rate: 45; mL/hr; Medication - Feeding Tube Flush Frequency: At least 15-30 mL water before and after medication administration and with tube clogging; Change 1 " "RELIZO...  Clear Liquid Diet    DVT Prophylaxis: Ambulate every shift  Piedra Catheter: Not present  Central Lines: None  Code Status: Full Code      Disposition Plan   Expected discharge:    recommended to prior living arrangement once adequate pain management/ tolerating PO medications.     The patient's care was discussed with the Patient and Patient's Family.    Yvette Rich DO  Hospitalist Service, [address]  Securely message with the Vocera Web Console (learn more here)  Text page via Embedded Chat Paging/Directory  Please see sign in/sign out for up to date coverage information    Clinically Significant Risk Factors Present on Admission               ______________________________________________________________________    Interval History   Early this morning, woke up feeling \"loopy\" with generalized weakness, increased upper abdominal pain and lower chest pain, increased nausea. Slept well, she reports, but just doesn't feel like herself this morning.   Still has not had a BM.     Data reviewed today: I reviewed all medications, new labs and imaging results over the last 24 hours. I personally reviewed the chest x-ray image(s) showing no focal airspace disease, small b/l pleural effusions, and fluid filled stomach.    Physical Exam   Vital Signs: Temp: 97.9  F (36.6  C) Temp src: Oral BP: 98/57 Pulse: 82   Resp: 18 SpO2: 96 % O2 Device: None (Room air) Oxygen Delivery: 2 LPM  Weight: 130 lbs 0 oz  Constitutional/Psych: appears mildly uncomfortable, lying in bed, conversant and cooperative, tearful   Cardiovascular: regular rate and rhythm, normal S1 and S2, no murmurs, rubs, or gallops noted, no bilateral lower extremity edema   Respiratory: clear to auscultation bilaterally, no wheezing, rales, or rhonchi, normal work of breathing   GI: abdomen soft, new feeding tube in place and dressing noted to be mildly bloody, moderate tenderness to palpation around RUQ " without rebound or guarding, non distended, bowel sounds present   Neuro: alert and oriented, no gross focal deficits noted     Data   Recent Labs   Lab 09/23/21  1000 09/23/21  0901 09/23/21  0608 09/23/21  0603 09/22/21  0619 09/22/21  0603 09/21/21  2145 09/21/21  0632 09/19/21  0202 09/18/21  1936   WBC  --   --  6.6  --   --  5.5  --  4.9  --   --    HGB  --   --  13.2  --   --  13.0  --  12.5  --   --    MCV  --   --  95  --   --  95  --  96  --   --    PLT  --   --  457*  --   --  441  --  419  --   --    NA  --   --  139  --   --  139  --  138  --   --    POTASSIUM  --   --  3.9  --   --  3.6  --  3.9  --   --    CHLORIDE  --   --  102  --   --  102  --  103  --   --    CO2  --   --  32  --   --  34*  --  31  --   --    BUN  --   --  8  --   --  9  --  10  --   --    CR  --   --  0.55  --   --  0.81  --  0.78  --   --    ANIONGAP  --   --  5  --   --  3  --  4  --   --    KASSI  --   --  9.0  --   --  9.4  --  9.0  --   --    GLC  --  115* 143* 131*   < > 82   < > 91   < >  --    ALBUMIN  --   --  3.2*  --   --  3.4  --  3.1*  --   --    PROTTOTAL  --   --  6.8  --   --  7.2  --  6.5*  --   --    BILITOTAL  --   --  0.4  --   --  0.6  --  0.5  --   --    ALKPHOS  --   --  122  --   --  116  --  107  --   --    ALT  --   --  63*  --   --  74*  --  66*  --   --    AST  --   --  35  --   --  38  --  38  --   --    LIPASE  --   --   --   --   --   --   --   --   --  12*   TROPONIN <0.015  --   --   --   --   --   --   --   --   --     < > = values in this interval not displayed.     Recent Results (from the past 24 hour(s))   XR Chest 2 Views    Narrative    EXAM: XR CHEST 2 VW  9/23/2021 8:47 AM     HISTORY:  hypoxia, shortness of breath, recent GJ placement       COMPARISON:  12/28/2016, 12/28/2016    TECHNIQUE: PA and lateral radiographs of the chest.    FINDINGS:   The trachea is midline. Cardiac mediastinal silhouette is within  normal limits. No focal airspace opacities. Trace bilateral pleural  effusions.  No pneumothorax. Surgical clips in the mid upper abdomen  and left upper quadrant. Pneumobilia. Partially visualized GJ tube. No  acute abnormality of the visualized bones, soft tissues, and upper  abdomen.      Impression    IMPRESSION:   1. No acute airspace disease.  2. Trace bilateral pleural effusions.  3. Partially visualized GJ tube.     I have personally reviewed the examination and initial interpretation  and I agree with the findings.    TIN AMAYA MD         SYSTEM ID:  [id number]     Medications       acetaminophen  975 mg Oral Q8H     amitriptyline  60 mg Oral or Feeding Tube At Bedtime     azelastine  1 spray Both Nostrils BID     cetirizine  10 mg Oral or Feeding Tube QAM     cyclobenzaprine  10 mg Oral or Feeding Tube TID     famotidine  20 mg Oral QAM     lactated ringers  500 mL Intravenous Once     levothyroxine  137 mcg Oral QAM AC     lidocaine 4%   Topical BID     [Held by provider] mirtazapine  30 mg Orally disintegrating tablet At Bedtime     montelukast  10 mg Oral At Bedtime     multivitamins w/minerals  15 mL Per Feeding Tube Daily     pantoprazole  40 mg Oral BID AC     polyethylene glycol  17 g Oral TID     Prucalopride Succinate  2 mg Oral QAM     scopolamine  1 patch Transdermal Q72H    And     scopolamine   Transdermal Q8H     senna-docusate  1-2 tablet Oral BID     sodium chloride (PF)  3 mL Intracatheter Q8H

## 2021-09-23 NOTE — PROGRESS NOTES
Pt's TF rate was increased to 45 ml/hr after 30 ml flush. Pt stated that she is tolerating the feedings well. Pt also stated that only 325 ml (1 carton) of [employer] Peptide feeding was administered. Goal is to reach 1080 ml/day, but to pt's tolerance.

## 2021-09-23 NOTE — PROGRESS NOTES
Second carton of [employer]ion Peptide was administered. Rate increased to 65 ml/hr d/t positive pt response to rate.

## 2021-09-23 NOTE — TELEPHONE ENCOUNTER
amitriptyline (ELAVIL) 10 MG tablet     Called pharmacy to update the pharmacy that this has been discontinued.  No further action needed.      9/24/2021  amitriptyline (ELAVIL) 10 MG tablet (Discontinued) 90 tablet 11 9/16/2021 9/17/2021 No   Sig - Route: Take 1 tablet (10 mg) by mouth At Bedtime Take 40 mg at bedtime - Oral   Sent to pharmacy as: Amitriptyline HCl 10 MG Oral Tablet (ELAVIL)   Class: E-Prescribe   Reason for Discontinue: Medication Reconciliation Clean Up   Order: [id number]   E-Prescribing Status: Receipt confirmed by pharmacy (9/16/2021 12:48 PM CDT)     Gina Delgadillo RN  Central Triage Red Flags/Med Refills

## 2021-09-23 NOTE — PROVIDER NOTIFICATION
"Paged Dr. Gann (signed in) regarding pt change in status. Pt woke up this morning feeling \"not good\" and very warm. Pt became diaphoretic. Hands are slightly swollen, non-pitting. VSS except O2 lower than normal for pt (91-92%). Pt reported slight SOB and tiredness. .     /58 (BP Location: Left arm)   Pulse 85   Temp 98.5  F (36.9  C) (Oral)   Resp 18   Ht 1.702 m (5' 7\")   Wt 59 kg (130 lb)   SpO2 92%   BMI 20.36 kg/m      Second carton of TF finished right when pt woke up. TF is on pause. Asked provider to advise.     Update: Dr. Gann recommended a CXR, decreasing her TF rate to half of current rate and continuing to monitor her symptoms/labs.   "

## 2021-09-23 NOTE — PROGRESS NOTES
Care Management Follow Up    Length of Stay (days): 3    Expected Discharge Date: 09/22/2021?     Concerns to be Addressed: medical readiness  Patient plan of care discussed at interdisciplinary rounds: Yes    Anticipated Discharge Disposition: Home  Anticipated Discharge Services:  Home infusion, home care  Anticipated Discharge DME:  No DME noted.     Patient/Family in Agreement with the Plan: yes    Referrals Placed by CM/SW: Home Infusion  Private pay costs discussed: Not applicable    Additional Information:  Per unit rounds, potential for patient to discharge today pending meeting enteral feeding goals and decrease in abdominal pain. Writer updated [facility], when patient is medically ready for discharge, likely to discharge to home and hook up to new formula at home, as she will resume cycled feeds once tolerating continuous rates here at the hospital. Home infusion order updated to reflect new enteral orders. RNCC will continue to follow for discharge planning.     [facility] (enteral feedings)  Phone:  [phone]  Fax: [phone]    [facility] (skilled nursing visits)    Kelly Wynn, MONICACC, BSN    [facility]    Medicine Group  [address] [place] [id number]    [email or web address]    Office: [phone] Pager: [phone]  To contact the weekend RNCC, page [phone].

## 2021-09-23 NOTE — PROGRESS NOTES
GASTROENTEROLOGY PROGRESS NOTE    Date: 09/23/2021     ASSESSMENT:  Dinora Mcghee is a 56 year old female with a history of hypothyroidism, GERD, allergic rhinitis, migraines, hiatal hernia, lumbar DDD, pituitary adenoma s/p resection, IgG4 pseudotumor of liver, chronic pancreatitis s/p TPAIT (2016), gastroparesis, FTT, admitted 9/7-9/12/21 for PEG tube replacement by percutaneous GJ tube (9/7/21), complicated by displacement of the GJ tube, underwent PEG J-tube revision with T tag gastropexy on 9/10/21, presenting with 3 days of pain at the enteric tube sites and some fullness in the PEGJ tube insertion site.     # PEGJ pain  # Malnutrition s/p PEGJ placement, lost 23 lbs in 6 months.   # Constipation, related to opioid use during admission  # Gastroparesis  Pt had same intolerance of previous GJ pre-TPIAT, and post TPIAT, therefore may be a visceral intolerance BUT clearly has acute/semi acute inflammation around PEG tube tract, that may or may not resolve. Spasm pain probably due to jejunal peristalsis pulling on G tube and impacting bumper in abd wall. Has gastroparesis so gastric distention probably playing a role.     PEGJ tube was clogged 9/21 so revision 9/22 showed coiled back of J tube in stomach, and new tube was placed. Pains remains the same post procedure. Overall patient might not tolerate the tube feeding. However, remains having significant weight loss from gastroparesis. Pending further discussion of patient and Dr. Park.       RECOMMENDATIONS:  -- Discussed with patient, switched her formula to plant-based formula  -- Appreciate pain, nutrition, and psychiatry consult  -- Schedule milarax to BID for constipation related to opioid, can increase senna to BID  -- Continue home Mote[employer]ity for gastroparesis  -- Ambulate as much as possible  -- Will discuss further direction with Dr. Park    Gastroenterology follow up recommendations: Pending clinical course.      Thank you for involving us  "in this patient's care. Please do not hesitate to contact the GI service with any questions or concerns.      Pt care plan discussed with Dr. Dodson, GI staff physician.    Maren Flores MD  Gastroenterology Fellow  Division of Gastroenterology, Hepatology and Nutrition  [facility]  Page [phone]  _______________________________________________________________    Subjective: No bowel movement for the past 4 days (since being admitted), is taking daily miralax. Per team, plan for enema today. Passing gas. Still having pain around the PEGJ tube with bowel peritalsis and now more diffuse at RLQ, requiring po hydromorphone. Has not been ambulating much, but has been outside with wheelchair yesterday.    Objective:  Blood pressure 117/74, pulse 89, temperature 98.3  F (36.8  C), temperature source Oral, resp. rate 18, height 1.702 m (5' 7\"), weight 59 kg (130 lb), SpO2 98 %, not currently breastfeeding.    Gen: A&Ox3, NAD  HEENT: sclera anicteric  CV: RRR  Lungs: breathing comfortably on room air  Abd: Non-distended, +bs, no hepatosplenomegaly, Tender around PEGJ tube area, not tender on other area, no peritoneal signs, PEGJ tube with surrounding erythema, non-exudative.   Skin: no jaundice, no stigmata of chronic liver disease  MS: appropriate muscle mass for age  Neuro: non focal     LABS:  BMP  Recent Labs   Lab 09/23/21  0901 09/23/21  0608 09/23/21  0603 09/23/21  0210 09/22/21  0619 09/22/21  0603 09/21/21  2145 09/21/21  0632 09/19/21  0202 09/18/21  1717   NA  --  139  --   --   --  139  --  138  --  137   POTASSIUM  --  3.9  --   --   --  3.6  --  3.9  --  3.9   CHLORIDE  --  102  --   --   --  102  --  103  --  103   KASSI  --  9.0  --   --   --  9.4  --  9.0  --  9.2   CO2  --  32  --   --   --  34*  --  31  --  30   BUN  --  8  --   --   --  9  --  10  --  13   CR  --  0.55  --   --   --  0.81  --  0.78  --  0.73   * 143* 131* 135*   < > 82   < > 91   < > 95    < > = values in this interval " not displayed.     CBC  Recent Labs   Lab 09/23/21  0608 09/22/21  0603 09/22/21  0603 09/21/21  0632 09/21/21  0632 09/18/21  1717 09/18/21  1717   WBC 6.6  --  5.5  --  4.9  --  5.4   RBC 4.21  --  4.11  --  3.99  --  4.29   HGB 13.2   < > 13.0   < > 12.5   < > 13.6   HCT 40.1  --  39.1  --  38.4  --  39.8   MCV 95  --  95  --  96  --  93   MCH 31.4  --  31.6  --  31.3  --  31.7   MCHC 32.9  --  33.2  --  32.6  --  34.2   RDW 13.4  --  13.4  --  13.5  --  13.3   *  --  441  --  419  --  426    < > = values in this interval not displayed.     INRNo lab results found in last 7 days.  LFTs  Recent Labs   Lab 09/23/21  0608 09/22/21  0603 09/21/21  0632 09/18/21 1717   ALKPHOS 122 116 107 133   AST 35 38 38 29   ALT 63* 74* 66* 77*   BILITOTAL 0.4 0.6 0.5 0.3   PROTTOTAL 6.8 7.2 6.5* 7.7   ALBUMIN 3.2* 3.4 3.1* 3.8      PANC  Recent Labs   Lab 09/18/21  1936   LIPASE 12*     IMAGING: CXR 9/23/21  1. No acute airspace disease.  2. Trace bilateral pleural effusions.  3. Partially visualized GJ tube.

## 2021-09-23 NOTE — PROGRESS NOTES
"The Pt is alert and oriented x 4.She reported of not feeling well.She was sobbing.  Description of symptoms vague.Then reported of shakiness. Blood sugar 117.Oxygen saturation 90-91 % on RA. Placed on 2 liter of O2.  VSS. Provider paged. Provider here to see Pt. Bisacodyl supp administered. No stool felt during supp administration. Pt also received Miralax and senna this AM.  Spouse here and emotional support given to Pt  .  Pain managed with 2 mg of oral Dilaudid.Flexiril administered also.  Pt appears much relaxed and calm now and took a short nap.   Pt ambulated the blevins x 1 after her nap with spouse when she felt better, gait steady.     Has not had a BM. She attempted to defecate, but was unsuccessful.  TF was increased to 45 ml/hr at 1145 and relizorb was changed. TF infusing without difficulty.  Pt took all AM med by mouth and tolerated. Phenergan x 1 for nausea, no emesis.Tolerating water.   GJ tube site cleansed and new dressing applied.  Call light within reach.  Spouse here and supportive.  /74 (BP Location: Right arm)   Pulse 89   Temp 98.3  F (36.8  C) (Oral)   Resp 18   Ht 1.702 m (5' 7\")   Wt 59 kg (130 lb)   SpO2 98%   BMI 20.36 kg/m      "

## 2021-09-24 ENCOUNTER — HOME INFUSION (PRE-WILLOW HOME INFUSION) (OUTPATIENT)
Dept: PHARMACY | Facility: CLINIC | Age: 56
End: 2021-09-24

## 2021-09-24 ENCOUNTER — MEDICAL CORRESPONDENCE (OUTPATIENT)
Dept: HEALTH INFORMATION MANAGEMENT | Facility: CLINIC | Age: 56
End: 2021-09-24

## 2021-09-24 VITALS
BODY MASS INDEX: 20.4 KG/M2 | HEIGHT: 67 IN | WEIGHT: 130 LBS | DIASTOLIC BLOOD PRESSURE: 71 MMHG | TEMPERATURE: 97.6 F | HEART RATE: 83 BPM | OXYGEN SATURATION: 95 % | RESPIRATION RATE: 18 BRPM | SYSTOLIC BLOOD PRESSURE: 103 MMHG

## 2021-09-24 LAB
ANION GAP SERPL CALCULATED.3IONS-SCNC: 5 MMOL/L (ref 3–14)
ATRIAL RATE - MUSE: 79 BPM
BUN SERPL-MCNC: 11 MG/DL (ref 7–30)
CALCIUM SERPL-MCNC: 9.2 MG/DL (ref 8.5–10.1)
CHLORIDE BLD-SCNC: 101 MMOL/L (ref 94–109)
CO2 SERPL-SCNC: 31 MMOL/L (ref 20–32)
CREAT SERPL-MCNC: 0.58 MG/DL (ref 0.52–1.04)
DIASTOLIC BLOOD PRESSURE - MUSE: NORMAL MMHG
ERYTHROCYTE [DISTWIDTH] IN BLOOD BY AUTOMATED COUNT: 13.3 % (ref 10–15)
GFR SERPL CREATININE-BSD FRML MDRD: >90 ML/MIN/1.73M2
GLUCOSE BLD-MCNC: 116 MG/DL (ref 70–99)
GLUCOSE BLDC GLUCOMTR-MCNC: 119 MG/DL (ref 70–99)
GLUCOSE BLDC GLUCOMTR-MCNC: 123 MG/DL (ref 70–99)
HCT VFR BLD AUTO: 43.1 % (ref 35–47)
HGB BLD-MCNC: 14.3 G/DL (ref 11.7–15.7)
INTERPRETATION ECG - MUSE: NORMAL
MCH RBC QN AUTO: 31.3 PG (ref 26.5–33)
MCHC RBC AUTO-ENTMCNC: 33.2 G/DL (ref 31.5–36.5)
MCV RBC AUTO: 94 FL (ref 78–100)
P AXIS - MUSE: 63 DEGREES
PLATELET # BLD AUTO: 496 10E3/UL (ref 150–450)
POTASSIUM BLD-SCNC: 4.1 MMOL/L (ref 3.4–5.3)
PR INTERVAL - MUSE: 148 MS
QRS DURATION - MUSE: 80 MS
QT - MUSE: 384 MS
QTC - MUSE: 440 MS
R AXIS - MUSE: 22 DEGREES
RBC # BLD AUTO: 4.57 10E6/UL (ref 3.8–5.2)
SODIUM SERPL-SCNC: 137 MMOL/L (ref 133–144)
SYSTOLIC BLOOD PRESSURE - MUSE: NORMAL MMHG
T AXIS - MUSE: 58 DEGREES
VENTRICULAR RATE- MUSE: 79 BPM
WBC # BLD AUTO: 6.7 10E3/UL (ref 4–11)

## 2021-09-24 PROCEDURE — 250N000013 HC RX MED GY IP 250 OP 250 PS 637: Performed by: INTERNAL MEDICINE

## 2021-09-24 PROCEDURE — 85027 COMPLETE CBC AUTOMATED: CPT | Performed by: INTERNAL MEDICINE

## 2021-09-24 PROCEDURE — 250N000009 HC RX 250: Performed by: INTERNAL MEDICINE

## 2021-09-24 PROCEDURE — 36415 COLL VENOUS BLD VENIPUNCTURE: CPT | Performed by: INTERNAL MEDICINE

## 2021-09-24 PROCEDURE — 80048 BASIC METABOLIC PNL TOTAL CA: CPT | Performed by: INTERNAL MEDICINE

## 2021-09-24 PROCEDURE — 99239 HOSP IP/OBS DSCHRG MGMT >30: CPT | Performed by: INTERNAL MEDICINE

## 2021-09-24 RX ORDER — CYCLOBENZAPRINE HCL 10 MG
10 TABLET ORAL 3 TIMES DAILY
Qty: 90 TABLET | Refills: 0 | Status: SHIPPED | OUTPATIENT
Start: 2021-09-24 | End: 2021-10-24

## 2021-09-24 RX ORDER — ACETAMINOPHEN 500 MG
1000 TABLET ORAL EVERY 8 HOURS
Status: ON HOLD | COMMUNITY
Start: 2021-09-24 | End: 2021-12-07

## 2021-09-24 RX ADMIN — LIDOCAINE: 40 CREAM TOPICAL at 08:39

## 2021-09-24 RX ADMIN — CETIRIZINE HYDROCHLORIDE 10 MG: 10 TABLET, FILM COATED ORAL at 08:38

## 2021-09-24 RX ADMIN — CYCLOBENZAPRINE HYDROCHLORIDE 10 MG: 5 TABLET, FILM COATED ORAL at 08:38

## 2021-09-24 RX ADMIN — PANTOPRAZOLE SODIUM 40 MG: 40 TABLET, DELAYED RELEASE ORAL at 08:38

## 2021-09-24 RX ADMIN — FAMOTIDINE 20 MG: 20 TABLET, FILM COATED ORAL at 08:38

## 2021-09-24 RX ADMIN — CALCIUM CARBONATE (ANTACID) CHEW TAB 500 MG 1000 MG: 500 CHEW TAB at 02:57

## 2021-09-24 RX ADMIN — ACETAMINOPHEN 975 MG: 325 TABLET, FILM COATED ORAL at 06:20

## 2021-09-24 RX ADMIN — AZELASTINE HYDROCHLORIDE 1 SPRAY: 137 SPRAY, METERED NASAL at 08:37

## 2021-09-24 RX ADMIN — LEVOTHYROXINE SODIUM 137 MCG: 0.14 TABLET ORAL at 08:39

## 2021-09-24 RX ADMIN — DOCUSATE SODIUM 50 MG AND SENNOSIDES 8.6 MG 2 TABLET: 8.6; 5 TABLET, FILM COATED ORAL at 08:38

## 2021-09-24 ASSESSMENT — ACTIVITIES OF DAILY LIVING (ADL)
ADLS_ACUITY_SCORE: 8

## 2021-09-24 NOTE — PLAN OF CARE
Neuro: A&Ox4.   Cardiac: SR. VSS. B/P: 117/76, T: 98.5, P: 83, R: 18  Respiratory: O2 sats stable on RA  GI/: Adequate urine output. Loose BM X1 GJ - G to MANDA RENNER with TF @ 45ml/h  Diet/appetite: Tolerating regular diet. TF @ 45ml/h via J tube. Intermittent nausea relieved with PRN medication  Activity:  Independent  Pain: At acceptable level on current regimen.   Skin: No new deficits noted.  LDA's: PIV, G/J    Plan: Continue with POC. Notify primary team with changes.

## 2021-09-24 NOTE — DISCHARGE SUMMARY
[facility]  Hospitalist Discharge Summary      Date of Admission:  9/18/2021  Date of Discharge:  9/24/2021  3:14 PM  Discharging Provider: Yvette Rich DO  Discharge Team: Hospitalist Service, Gold 10    Discharge Diagnoses   Acute on Chronic Abdominal Pain   GJ tube with dependence on enteral feeding   Gastroparesis  Chronic Pancreatitis s/p pancreatectomy with islet cell transplant   Depression  Hx of IgG4 pseudotumor    Follow-ups Needed After Discharge       Unresulted Labs Ordered in the Past 30 Days of this Admission     Date and Time Order Name Status Description    9/23/2021  9:09 AM Blood Culture Hand, Right Preliminary     9/23/2021  9:09 AM Blood Culture Hand, Left Preliminary       These results will be followed up by PCP    Discharge Disposition   Discharged to home  Condition at discharge: Stable    Hospital Course   56-year-old female patient presented to the hospital with abdominal pain, nausea, and vomiting.  The patient has history of hypothyroidism, GERD, allergic rhinitis, migraines, hiatal hernia, lumbar DDD, pituitary adenoma s/p resection, IgG4 pseudotumor of liver, chronic pancreatitis s/p TPAIT, gastroparesis, FTT, admitted 9/7-9/12/21 for PEG tube replacement by percutaneous GJ tube (9/7/21), complicated by displacement of the GJ tube, ultimately undergoing EGD with GI for replacement of PEG J-tube with T tag gastropexy on 9/10/21.   She presented this admission 9/19 for worsening abdominal pain at enteric tube site. GJ tube found to be clogged and replaced this admission by GI on 9/22. After exchange of tube and change of tube feed formulation, patient noted improvement in pain and was discharged home.       Acute on Chronic Abdominal Pain - improved   GJ tube with dependence on enteral feeding   Gastroparesis  Chronic Pancreatitis s/p pancreatectomy with islet cell transplant   In setting of  known gastroparesis, chronic pancreatitis s/p  "pancreatectomy with islet cell transplant, s/p percutaneous gastrojejunostomy tube placement on 9/7/2021, s/p replacement of percutaneous gastrojejunostomy tube with T tag gastropexy on 9/10/2021. Complicated by constipation.   On admission, the patient had unremarkable CT abdomen pelvis with contrast with no acute changes.   GJ tube replaced due to being clogged on 9/22 by GI.  Tube feed formulation also changed to plant-based formulation.   Pain improved prior to discharge.   -GI consulted, appreciate assistance   -Seen by pain management service this admission as well   -Discharged on schedule acetaminophen and flexaril dose increased from 5 mg ( PTA dose) to 10 mg TID for spasms   -Continue bowel regimen  -Continue PTA antiemetics   -Tube feeds changed to plant-based regimen per GI recs this admission, home health company notified   -Gastroparesis: Continue PTA Prucalopride 2 mg every morning  -Advance diet as tolerated as outpatient      Depression  -Continue PTA amitryptyline   -Attempted to start mirtazapine this admission, however patient reported \"loopiness\" the second morning after taking it so it was discontinued. Avoid mirtazapine in the future.    -Health psychology and psychiatry have has seen her      Chronic medical problems  Hx of IgG4 pseudotumor  Mild LFT abnormality with mildly elevated IgG4. Liver lesion previously biopsied with noticeable proliferation and acute and chronic inflammation and fibrosis of liver with focally increased IgG4-positive plasma cells consistent with IgG4 pseudotumor. Followed by hepatology.   - Outpatient follow up with GI      GERD  - Resume famotidine po 20 mg BID, omeprazole po 40 mg BID at discharge     RAD:  Allergic rhinitis   - Continue Cetrizine 10 mg daily  - Continue Benadryl 25 mg Q6H PRN  - Resume PTA montelukast 10 mg daily at discharge     Hypothyroidism  - Continue PTA Levothyroxine 137 mcg daily      Hx of migraine HA  - Continue PTA Amitriptyline 60 " mg at bedtime  - Continue Zolmitriptan 5 mg as needed at onset of headache, increased frequency to twice daily as needed    Consultations This Hospital Stay   GI LUMINAL ADULT IP CONSULT  GI PANCREATICOBILIARY ADULT IP CONSULT  PAIN MANAGEMENT ADULT IP CONSULT  NUTRITION SERVICES ADULT IP CONSULT  PHARMACY IP CONSULT  VASCULAR ACCESS CARE ADULT IP CONSULT  VASCULAR ACCESS CARE ADULT IP CONSULT  WOUND OSTOMY CONTINENCE NURSE  IP CONSULT  CARE MANAGEMENT / SOCIAL WORK IP CONSULT  PSYCHIATRY IP CONSULT  PSYCHOLOGY ADULT IP CONSULT    Code Status   Full Code    Time Spent on this Encounter   IYvette DO, personally saw the patient today and spent greater than 30 minutes discharging this patient.       DO AUSTIN Finney [facility]  [address] [place] [address]  Phone: [phone]  Fax: [phone]  ______________________________________________________________________    Physical Exam   Vital Signs: Temp: 97.6  F (36.4  C) Temp src: Oral BP: 103/71 Pulse: 83   Resp: 18 SpO2: 95 % O2 Device: None (Room air)    Weight: 130 lbs 0 oz  Constitutional/Psych: No apparent distress, comfortable, pleasant and cooperative   Cardiovascular: regular rate and rhythm, normal S1 and S2, no murmurs, rubs, or gallops noted, no bilateral lower extremity edema   Respiratory: clear to auscultation bilaterally, no wheezing, rales, or rhonchi, normal work of breathing   GI: abdomen soft, new feeding tube in place with dressing clean and dry, minimally tenderness to palpation around RUQ without rebound or guarding, non distended, bowel sounds present   Neuro: alert and oriented, no gross focal deficits noted        Primary Care Physician   Latasha Paul    Discharge Orders       Significant Results and Procedures   Results for orders placed or performed during the hospital encounter of 09/18/21   CT Abdomen Pelvis w Contrast    Narrative    EXAM: CT ABDOMEN PELVIS W  CONTRAST  LOCATION: [facility]  DATE/TIME: 9/18/2021 8:56 PM    INDICATION: Abdominal pain, history of pancreatectomy and auto transplantation, anastomotic strictures.  COMPARISON: CT enterography performed 07/20/2021.  TECHNIQUE: CT scan of the abdomen and pelvis was performed following injection of IV contrast. Multiplanar reformats were obtained. Dose reduction techniques were used.  CONTRAST: Iopamidol (Isovue-370) solution 80 mL.       FINDINGS:   LOWER CHEST: Trace bilateral pleural effusions.    HEPATOBILIARY: Status post cholecystectomy. Trace pneumobilia.    PANCREAS: Status post distal pancreatectomy.    SPLEEN: Status post splenectomy.    ADRENAL GLANDS: Normal.    KIDNEYS/BLADDER: Normal.    BOWEL: GJ tube in situ. No obstruction, colitis, diverticulitis, or appendicitis. No free air or fluid.    LYMPH NODES: Normal.    VASCULATURE: No aneurysm. Retroaortic left renal vein. Widely patent arterial vasculature. Widely patent portal vasculature. Widely patent hepatic veins.    PELVIC ORGANS: Absent uterus.    MUSCULOSKELETAL: Salt and pepper lesion in the L2 vertebral body, not significantly changed consistent with a vertebral hemangioma.      Impression    IMPRESSION:   1.  Postsurgical changes consistent with distal pancreatectomy, splenectomy, and cholecystectomy.  2.  No obstruction, colitis, diverticulitis, or appendicitis.  3.  Mild pneumobilia.  4.  GJ tube in situ.   XR Surgery GAMA Fluoro L/T 5 Min w Stills    Narrative    This exam was marked as non-reportable because it will not be read by a   radiologist or a [place] non-radiologist provider.         XR Chest 2 Views    Narrative    EXAM: XR CHEST 2 VW  9/23/2021 8:47 AM     HISTORY:  hypoxia, shortness of breath, recent GJ placement       COMPARISON:  12/28/2016, 12/28/2016    TECHNIQUE: PA and lateral radiographs of the chest.    FINDINGS:   The trachea is midline. Cardiac mediastinal silhouette  is within  normal limits. No focal airspace opacities. Trace bilateral pleural  effusions. No pneumothorax. Surgical clips in the mid upper abdomen  and left upper quadrant. Pneumobilia. Partially visualized GJ tube. No  acute abnormality of the visualized bones, soft tissues, and upper  abdomen.      Impression    IMPRESSION:   1. No acute airspace disease.  2. Trace bilateral pleural effusions.  3. Partially visualized GJ tube.     I have personally reviewed the examination and initial interpretation  and I agree with the findings.    TIN AMAYA MD         SYSTEM ID:  [id number]     *Note: Due to a large number of results and/or encounters for the requested time period, some results have not been displayed. A complete set of results can be found in Results Review.       Discharge Medications   Current Discharge Medication List      START taking these medications    Details   acetaminophen (TYLENOL) 500 MG tablet Take 2 tablets (1,000 mg) by mouth every 8 hours    Associated Diagnoses: Abdominal spasms         CONTINUE these medications which have CHANGED    Details   cyclobenzaprine (FLEXERIL) 10 MG tablet 1 tablet (10 mg) by Oral or Feeding Tube route 3 times daily  Qty: 90 tablet, Refills: 0    Associated Diagnoses: Abdominal spasms         CONTINUE these medications which have NOT CHANGED    Details   !! amitriptyline (ELAVIL) 10 MG tablet Take 10 mg by mouth At Bedtime Take with a 50mg tab for a total of 60mg      !! amitriptyline (ELAVIL) 50 MG tablet Take 1 tablet (50 mg) by mouth At Bedtime  Qty: 90 tablet, Refills: 3    Comments: Take with 1 10 mg tab at HS.  Associated Diagnoses: Headache disorder      azelastine (ASTELIN) 0.1 % nasal spray Spray 1 spray into both nostrils 2 times daily  Qty: 1 Bottle, Refills: 11    Associated Diagnoses: Seasonal allergies      cetirizine (ZYRTEC) 10 MG tablet Take 1 tablet (10 mg) by mouth daily  Qty: 90 tablet, Refills: 3    Associated Diagnoses: Elevated liver  enzymes      diphenhydrAMINE (BENADRYL ALLERGY) 25 MG capsule Take 1 capsule (25 mg) by mouth every 6 hours as needed for itching or allergies  Qty: 56 capsule, Refills: 0    Associated Diagnoses: Itching; Post-pancreatectomy diabetes (H); History of pancreatectomy; Pancreatic insufficiency      estradiol (VAGIFEM) 10 MCG TABS vaginal tablet Place 1 tablet (10 mcg) vaginally twice a week  Qty: 24 tablet, Refills: 3    Associated Diagnoses: Atrophic vaginitis      famotidine (PEPCID) 20 MG tablet Take 1 tablet (20 mg) by mouth 2 times daily  Qty: 180 tablet, Refills: 3    Associated Diagnoses: Gastroesophageal reflux disease without esophagitis      ibuprofen (ADVIL/MOTRIN) 400 MG tablet Take 1 tablet (400 mg) by mouth every 6 hours as needed for moderate pain  Qty: 30 tablet, Refills: 0    Associated Diagnoses: Acute post-operative pain      levothyroxine (SYNTHROID) 137 MCG tablet Take 1 tablet (137 mcg) by mouth daily  Qty: 90 tablet, Refills: 3    Associated Diagnoses: Hypothyroidism, unspecified type      montelukast (SINGULAIR) 10 MG tablet TAKE 1 TABLET(10 MG) BY MOUTH AT BEDTIME  Qty: 90 tablet, Refills: 3    Associated Diagnoses: Seasonal allergies      omeprazole (PRILOSEC) 40 MG DR capsule Take 1 capsule (40 mg) by mouth 2 times daily  Qty: 180 capsule, Refills: 3    Associated Diagnoses: Gastroesophageal reflux disease without esophagitis      polyethylene glycol (MIRALAX) 17 GM/Dose powder Take 17 g (1 capful) by mouth daily  Qty: 507 g, Refills: 11    Associated Diagnoses: Slow transit constipation      prochlorperazine (COMPAZINE) 5 MG tablet Take 1 tablet (5 mg) by mouth every 6 hours as needed Take two 5 mg tablets by mouth every six hours as needed for nausea.  Qty: 60 tablet, Refills: 1    Associated Diagnoses: History of pancreatectomy; Pancreatic insufficiency      promethazine (PHENERGAN) 25 MG tablet Take 1 tablet (25 mg) by mouth daily as needed  Qty: 60 tablet, Refills: 1    Associated  Diagnoses: Nausea      Prucalopride Succinate (MOTEGRITY) 2 MG TABS Take 2 mg by mouth daily  Qty: 30 tablet, Refills: 11    Associated Diagnoses: Chronic idiopathic constipation      scopolamine (TRANSDERM) 1 MG/3DAYS 72 hr patch Place 1 patch onto the skin every 72 hours  Qty: 10 patch, Refills: 11    Associated Diagnoses: Slow transit constipation      senna-docusate (SENOKOT-S;PERICOLACE) 8.6-50 MG per tablet Take 1-2 tablets by mouth 2 times daily  Qty: 100 tablet, Refills: 0    Associated Diagnoses: Incisional hernia, without obstruction or gangrene      ZOLMitriptan (ZOMIG) 5 MG tablet Take 1 tablet (5 mg) by mouth at onset of headache for migraine  Qty: 9 tablet, Refills: 11    Associated Diagnoses: Headache disorder      blood glucose (PAT CONTOUR NEXT) test strip Use to test blood sugar 6 times daily or as directed.  Qty: 2 Box, Refills: 11    Associated Diagnoses: Post-pancreatectomy diabetes (H)      blood glucose monitoring (PAT MICROLET) lancets Use to test blood sugar 6-8 times daily or as directed.  Qty: 100 each, Refills: 11    Associated Diagnoses: Acute on chronic pancreatitis (H)      Continuous Blood Gluc Sensor (FREESTYLE LISA 2 SENSOR) MISC 1 each every 14 days  Qty: 2 each, Refills: 11    Associated Diagnoses: Post-pancreatectomy diabetes (H)      glucose 40 % GEL gel Take 15-30 g by mouth every 15 minutes as needed for low blood sugar  Qty: 3 Tube, Refills: 2    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      order for DME Equipment being ordered:[employer]  Qty: 1 Device, Refills: 0    Associated Diagnoses: Headache disorder      Sharps Container (BD SHARPS ) MISC 1 Container as needed  Qty: 1 each, Refills: 1    Associated Diagnoses: Post-pancreatectomy diabetes (H)       !! - Potential duplicate medications found. Please discuss with provider.        Allergies   Allergies   Allergen Reactions     Apple Anaphylaxis     Corticosteroids Other (See Comments)     All  oral, IV and injectable steroids are contraindicated (unless in life threatening situations) in Islet Auto transplant recipients. They can cause irreversible loss of islet cell function. Please contact patient's transplant care coordinator ADI Gaffney RN at [phone]/pager [phone] and/or endocrinologist prior to administration.       Depakote [Divalproex Sodium] Other (See Comments)     Chest pain     Zithromax [Azithromycin Dihydrate] Anaphylaxis     Noted in 4/7/08 ER     Darvocet [Propoxyphene N-Apap] Itching     Morphine Nausea and Vomiting and Rash     Nalbuphine Hcl Rash     RASH :nubaine     Zosyn [Piperacillin-Tazobactam In D5w] Rash     Possible allergy, did have a diffuse rash that seemed drug related but could have also been related to soap in the hospital.      Bactrim [Sulfamethoxazole W-Trimethoprim] Other (See Comments) and Nausea and Vomiting     Severely low liver function.     Cats      Compazine [Prochlorperazine] Other (See Comments)     Twitching. Takes Benedryl and is fine     Dust Mites      Grass      Ragweeds      Tape [Adhesive Tape] Blisters     Tramadol      Trees      Zofran [Ondansetron] Other (See Comments)     migraine     Flagyl [Metronidazole] Hives and Rash

## 2021-09-24 NOTE — PROGRESS NOTES
"Shift [id number].    /71 (BP Location: Right arm)   Pulse 83   Temp 97.6  F (36.4  C) (Oral)   Resp 18   Ht 1.702 m (5' 7\")   Wt 59 kg (130 lb)   SpO2 95%   BMI 20.36 kg/m      Reason for admission: Abd pain, GJ tube replacement.   Activity: Independent.    Pain: Pt denies pain.  Neuro: A&Ox4.  Cardiac: WDL.   Respiratory: WDL.  GI/: New GJ tube was placed. Pt reporting pain at site that is managed with prn pain medication. Next Relizorb change is due at 0800. Current rate of Office Max Peptide 1.5 TF is 45 ml/hr. New carton admin at 0700.   Extremities: WDL.  Diet: Clears, ADAT. Tube feedings (Office Max Peptide 1.5).   Lines: PIV L, SL.   Labs/imaging/Consults: Gold 10.   Discharge Plan: In progress.     Pt has expressed feeling ready for discharge.   "

## 2021-09-24 NOTE — PROGRESS NOTES
GASTROENTEROLOGY PROGRESS NOTE    Date: 09/24/2021     ASSESSMENT:  Dinora Mcghee is a 56 year old female with a history of hypothyroidism, GERD, allergic rhinitis, migraines, hiatal hernia, lumbar DDD, pituitary adenoma s/p resection, IgG4 pseudotumor of liver, chronic pancreatitis s/p TPAIT (2016), gastroparesis, FTT, admitted 9/7-9/12/21 for PEG tube replacement by percutaneous GJ tube (9/7/21), complicated by displacement of the GJ tube, underwent PEG J-tube revision with T tag gastropexy on 9/10/21, presenting with 3 days of pain at the enteric tube sites and some fullness in the PEGJ tube insertion site.     # PEGJ pain  # Malnutrition s/p PEGJ placement, lost 23 lbs in 6 months.   # Constipation, related to opioid use during admission  # Gastroparesis  Pt had same intolerance of previous GJ pre-TPIAT, and post TPIAT, therefore may be a visceral intolerance BUT clearly has acute/semi acute inflammation around PEG tube tract, that may or may not resolve. Spasm pain probably due to jejunal peristalsis pulling on G tube and impacting bumper in abd wall. Has gastroparesis so gastric distention probably playing a role.     PEGJ tube was clogged 9/21 so revision 9/22 showed coiled back of J tube in stomach, and new tube was placed. Pains remains the same post procedure. Overall patient might not tolerate the tube feeding. However, remains having significant weight loss from gastroparesis. Pending further discussion of patient and Dr. Park.       RECOMMENDATIONS:  -- Please provide venting bag for patient for venting her G tube on discharge  -- Switched her formula to plant-based formula, primary team will provider supply for the patient  -- Appreciate pain, nutrition, and psychiatry consult    Gastroenterology follow up recommendations: Will schedule GI follow-up with Dr. Park.     Thank you for involving us in this patient's care. Please do not hesitate to contact the GI service with any questions or  "concerns.      Pt care plan discussed with Dr. Dodson, GI staff physician.    Maren Flores MD  Gastroenterology Fellow  Division of Gastroenterology, Hepatology and Nutrition  [facility]  Page [phone]  _______________________________________________________________    Subjective: Had bowel movement yesterday 3 times with suppository. Vomited clear fluid (not tube feeding) G tube on low intermittent suction since last night. Mirtazapine stopped, as patient thinks she has side effects from it. Feeling much better today. Pain is zero this morning. States that she feels better and is ready to go back home.    Objective:  Blood pressure 103/71, pulse 83, temperature 97.6  F (36.4  C), temperature source Oral, resp. rate 18, height 1.702 m (5' 7\"), weight 59 kg (130 lb), SpO2 95 %, not currently breastfeeding.    Gen: A&Ox3, NAD  HEENT: sclera anicteric  CV: RRR  Lungs: breathing comfortably on room air  Abd: Non-distended, +bs, no hepatosplenomegaly, not tender around PEGJ tube area, no peritoneal signs, PEGJ tube with minimal surrounding erythema, non-exudative.   Skin: no jaundice, no stigmata of chronic liver disease  MS: appropriate muscle mass for age  Neuro: non focal     LABS:  BMP  Recent Labs   Lab 09/24/21  0645 09/24/21  0624 09/24/21  0156 09/23/21  2219 09/23/21  0901 09/23/21  0608 09/22/21  0619 09/22/21  0603 09/21/21  2145 09/21/21  0632   NA  --  137  --   --   --  139  --  139  --  138   POTASSIUM  --  4.1  --   --   --  3.9  --  3.6  --  3.9   CHLORIDE  --  101  --   --   --  102  --  102  --  103   KASSI  --  9.2  --   --   --  9.0  --  9.4  --  9.0   CO2  --  31  --   --   --  32  --  34*  --  31   BUN  --  11  --   --   --  8  --  9  --  10   CR  --  0.58  --   --   --  0.55  --  0.81  --  0.78   * 116* 123* 118*   < > 143*   < > 82   < > 91    < > = values in this interval not displayed.     CBC  Recent Labs   Lab 09/24/21  0624 09/23/21  0608 09/23/21  0608 09/22/21  0603 " 09/22/21  0603 09/21/21  0632 09/21/21  0632   WBC 6.7  --  6.6  --  5.5  --  4.9   RBC 4.57  --  4.21  --  4.11  --  3.99   HGB 14.3   < > 13.2   < > 13.0   < > 12.5   HCT 43.1  --  40.1  --  39.1  --  38.4   MCV 94  --  95  --  95  --  96   MCH 31.3  --  31.4  --  31.6  --  31.3   MCHC 33.2  --  32.9  --  33.2  --  32.6   RDW 13.3  --  13.4  --  13.4  --  13.5   *  --  457*  --  441  --  419    < > = values in this interval not displayed.     INRNo lab results found in last 7 days.  LFTs  Recent Labs   Lab 09/23/21  0608 09/22/21  0603 09/21/21  0632 09/18/21  1717   ALKPHOS 122 116 107 133   AST 35 38 38 29   ALT 63* 74* 66* 77*   BILITOTAL 0.4 0.6 0.5 0.3   PROTTOTAL 6.8 7.2 6.5* 7.7   ALBUMIN 3.2* 3.4 3.1* 3.8      PANC  Recent Labs   Lab 09/18/21  1936   LIPASE 12*     IMAGING: CXR 9/23/21  1. No acute airspace disease.  2. Trace bilateral pleural effusions.  3. Partially visualized GJ tube.

## 2021-09-24 NOTE — PROGRESS NOTES
"Paged Dr. Gann: \"Hi, pt reporting of sharp midsternal/epigastric chest pain, a weird taste in her mouth, and the fear of the GJ displacing since she threw up a couple of times. Pt received antiemetic. Pls advise. Thanks!\"    Update: Dr. Gann ordered Tums to attempt to relieve the feeling of indigestion. He also would like his n/v to be monitored and if she is continuously vomiting, then he would consider an abdominal x-ray.   "

## 2021-09-28 LAB
BACTERIA BLD CULT: NO GROWTH
BACTERIA BLD CULT: NO GROWTH

## 2021-09-29 NOTE — PROGRESS NOTES
This is a recent snapshot of the patient's [facility] medical record.  For current drug dose and complete information and questions, call [phone] or In Basket pool, [facility] home infusion ([id number])  CSN Number:  [id number]

## 2021-09-30 ENCOUNTER — VIRTUAL VISIT (OUTPATIENT)
Dept: RHEUMATOLOGY | Facility: CLINIC | Age: 56
End: 2021-09-30
Attending: INTERNAL MEDICINE
Payer: COMMERCIAL

## 2021-09-30 DIAGNOSIS — D89.84 IGG4-RELATED SCLEROSING DISEASE (H): Primary | ICD-10-CM

## 2021-09-30 PROCEDURE — 99213 OFFICE O/P EST LOW 20 MIN: CPT | Mod: 95 | Performed by: INTERNAL MEDICINE

## 2021-09-30 ASSESSMENT — PAIN SCALES - GENERAL: PAINLEVEL: NO PAIN (0)

## 2021-09-30 NOTE — PROGRESS NOTES
Dinora is a 56 year old who is being evaluated via a billable video visit.      How would you like to obtain your AVS? MyChart  If the video visit is dropped, the invitation should be resent by: Text to cell phone: [phone]  Will anyone else be joining your video visit? No      Video Start Time: 8:41 AM  Video-Visit Details    Type of service:  Video Visit    Video End Time:8:52 AM    Originating Location (pt. Location): Home    Distant Location (provider location):  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   # h/o endometriosis  # h/o pituitary mass with 2/2 DI now s/p transsphenoidal resection in 1990  # hypothyroidism on synthroid.  # Fatigue in ill state    Discussion:  57 y/o  female with longstanding history of chronic pancreatitis s/p pancreatectomy with islet autotransplant 12/2016 who is overall improving but found to have elevated serum and > 60 HPF cells on liver biopsy staining. She has sicca symptoms for the past 5 years but no evidence of Sjogren's vs IgG4 related disease on lip biopsy. No other evidence of IgG4 related activity with no RPF, aortitis, orbital disease, thyroid, pulmonary, or kidney disease. Interestingly, IgG4 disease can impact the hypophysis and she does have a history of pituitary issues back in 1990. Recent meta analysis assessing IgG4 serum sensitivity is 87.2% with 82% specificity (1). Her biopsy containing > 50 IgG4  staining plasma cells is highly suggestive of IgG4 related disease as well and meets recent proposed pathologic recommendations of IgG4 related disease (2). She meets many of the features for IgG4 related disease including tissue biopsy.     In regards to management of her IgG4 Related disease there are no randomized control trials and all medicines are investigational. The most recent evidence highlights that the IgG4 antibodies are pathologic and that medications that suppress B lymphocytes like CD-20 cells (Rituximab) creating the antibodies are the preferred therapy. This is especially important if the organ dysfunction can be a critical organ like the liver in a patient with recent surgeries and transplant. Currently her liver dysfunction has normalized and there is no evidence of ongoing IgG4 related disease at this time.     Overall, we agree with the high levels of stress and degree of fatigue with worsening HA during active work we recommend she go very slow with her part time work. She has been severely ill and it will take time to regain and retrain her stamina.      9/13/2019: Doing well, stable, improving LFTs/IgG-4. Dr. Lugo is ok with me following LFTS and seeing pt PRN.      9/2019 Plan:    -- agree with plan to continue to monitor off immune suppressives given ongoing stability in liver labs and improving IgG4 levels.   -- If liver dysfunction returns would need to decide with GI if we suspect related to IgG4 and if so then therapeutic options for IgG4 related disease would normally be Rituximab preferred (but has steroids (can cause irreversible damage to islet cells) with infusion) vs MMF (no steroids but not ideal agent).  -- Patient deferred initiating Evoxac or restasis at this point in time.  -- discussed various lozenges that help sicca symptoms, was advised to try hydris colgate toothpaste  -- f/u with GI for N/V      9/30/2021 Plan (IgG4 in 8/2021 was 91 but only slightly above NL which  is 86, not concerning and could be monitored for now, ALT improved to almost NL in 9/2021):    IgG4 check every year (due 8/2022)    Return in 2 years    Anita Becerra MD          Active Problem List:     Patient Active Problem List    Diagnosis Date Noted     Abdominal pain, generalized 09/18/2021     Priority: Medium     Adult failure to thrive 08/16/2021     Priority: Medium     Added automatically from request for surgery [id number]       Hypoglycemia 08/05/2021     Priority: Medium     Iron deficiency anemia 03/22/2019     Priority: Medium     Headache, chronic migraine without aura 09/18/2018     Priority: Medium     Chronic pain syndrome 09/18/2018     Priority: Medium     S/P hernia repair 08/23/2018     Priority: Medium     Incisional hernia, without obstruction or gangrene 05/20/2018     Priority: Medium     Adhesive capsulitis of shoulder, unspecified laterality 11/14/2017     Priority: Medium     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  Document previously scanned on 1/12/17.  Validation form completed and scanned.  Code Status reflects choices in most recent ACP document..  Confirmed/documented designated decision maker(s).  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 Provider to review and confirm.       GERD (gastroesophageal reflux  "disease) 12/01/2010     Priority: Medium     Lumbago 04/18/2005     Priority: Medium     History of L5-S1 degenerative disk disease.         Sensorineural hearing loss 01/10/2005     Priority: Medium     Problem list name updated by automated process. Provider to review       Vertiginous syndrome and labyrinthine disorder 01/10/2005     Priority: Medium     Problem list name updated by automated process. Provider to review  IMO Regulatory Load OCT 2020       Sprain and strain of other specified sites of hip and thigh 12/09/2002     Priority: Medium     Chondromalacia of patella 12/09/2002     Priority: Medium     Need for prophylactic immunotherapy      Priority: Medium     trees, grass, srw, dust mites, cat       Allergic rhinitis due to other allergen 12/21/2001     Priority: Medium     Hypothyroidism      Priority: Medium     Problem list name updated by automated process. Provider to review              History of Present Illness:   Dinora Mcghee is a 56 year old female with a past medical history of hypothyroidism, h/o pituitary mass and secondary DI s/p transphenoidal resection 1990, HLD, left knee OA, lumbar DDD, chronic pancreatitis for 30 years, now s/p islet cell transplantation who presents today for further evaluation of elevated IgG4 found both in serum and liver biopsy as well as sicca symptoms.     She notes that she has had longstanding issues with pancreatitis without any mention of aortitis, RPF, urinary obstruction. She denies any history of cancers, lymphoma or leukemia. No family history of autoimmune conditions other than possible RA in her dad. She had distant DI secondary from pituitary mass (s/p removal and not needing hormones) and joint wise has longstanding OA of left knee. She mentions that for the past 5 years she has had dry eye requiring eye drops. Sometimes related to her allergies she has to now regularly use eye drops. \"Feels like sand\" is rubbing in eyes. She also noticed ~ " 5 years ago dry mouth where a cracker would not dissolve. She has to constantly carry water and drink it to keep her mouth dry. She uses sugar free gum and lozenges. Most of her clinical symptom burden has been related to her pancreas with frequent ERCP and concern for Sphincter of Oddi dysfunction. She had issues with significant pancreatic insuffiencey with malnutrition and weight loss. She underwent islet cell autotransplantation 12/28/2017 and was found to have elevated IgG4 cells in her liver biopsy. Subsequent IgG4 levels were elevated.    Interim history: 6/20/2017 - 8/4/2017  Comes in today frustrated about the multiple denials from the insurance about Rituximab for her IgG4 Related disease. She has had recent elevations of her LFTs and mentions that the last 3 days she has started to feel worse like before her pancreatectomy. Some nausea, no vomiting, but her appetite is lower. She denies any jaundice, but does not itching of her skin. She denies any fevers, chills, but has had 1 day of watery, nonbloody diarrhea. She had a recent MRCP with signs of biliary dilatation at ducts within the liver. She has a new hyperenhancement lesion in her liver with stability of other lesions. She is following with Dr. Lugo within the next week. She mentions that her dry eyes are getting worse.     Interim history: 8/4/2017 - 10/20/2017  After further discussion with GI and SOT regarding therapies Dinora's LFTs normalized and the consensus was to monitor and hold on immune suppressive therapies given the side effect profile. She has continued to improve and is now off insulin. She wonders about her known slow emptying an gastroparesis has been acting up and she has had issues with N/V the past 2 weeks particularly after she lays down (worse in AM, but best midday). Otherwise things have been going well, denies any infections and her shoulder is almost back to normal.     Interim History: 10/20/2017 - 3/23/2018  Returns  today feeling better. She has adjusted her diet with great improvement. Has noticed some fatigue the last several weeks, but denies any infectious symptoms. Has intermittent sharp pains in her left lower rib chest wall. This has been responding to tylenol. Denies any fevers or chills. She has noticed fatigue and tries to be very active with walking 3 miles. She mentions that she has to take naps now she never had before. She has been back part time as well that has worsened her control over her HA as well as her stamina levels.     9/2018: Sicca sx are managable on biotene and systane.    Has RUQ pain and nausea x 7days. Has migraines today.     9/13/2019:    Overall stable, tried to help pts with chronic illness as a .    Uses biotene products, chewing gums. Gastroparesis and GI is the same. Dryness of eyes is not so bad.    No rashes, hair loss, or oral ulcers.    Has plantar fascitis and knee crepitus.    Today 9/30/2021:    Had a hospitalization 9/18-9/24/2021 with following Dx:      Discharge Diagnoses     Acute on Chronic Abdominal Pain   GJ tube with dependence on enteral feeding   Gastroparesis  Chronic Pancreatitis s/p pancreatectomy with islet cell transplant   Depression  Hx of IgG4 pseudotumor      Abdominal pain resolved after change of G tube. Almost canceled this appointment as LFTs improved. No complaints today, feeling well.         Review of Systems:   A comprehensive ROS was done. Positives are per HPI.            Past Medical History:     Past Medical History:   Diagnosis Date     Allergic rhinitis, cause unspecified      Allergy to other foods     strawberries, apples, celeries, alice, watermelon     Arthritis     left knee     Choledocholithiasis     long after cholecystectomy     Chronic abdominal pain      Chronic constipation      Chronic nausea      Chronic pancreatitis (H)      Degeneration of lumbar or lumbosacral intervertebral disc      Esophageal reflux     w/ hiatal hernia      Gastroparesis      Hiatal hernia      History of pituitary adenoma     s/p resection     Hypothyroidism      Migraines      Mild hyperlipidemia      On tube feeding diet     presence of GJ tube     Pancreatic disease     PD stricture, 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    Past Surgical History  Past Surgical History:   Procedure Laterality Date     ABDOMEN SURGERY      c sections: 93, 96, 98. endometriosis growth     APPENDECTOMY       C  DELIVERY ONLY       C  DELIVERY ONLY      repeat c section with incidental cystotomy with repair     C EXCIS PITUITARY,TRANSNASAL/SEPTAL      pituitary tumor removed for diabetes insipidus     C TOTAL ABDOM HYSTERECTOMY      w/ bilateral salpingoophorectomy       SECTION       COLONOSCOPY       ENDOSCOPIC RETROGRADE CHOLANGIOPANCREATOGRAM N/A 2015    Procedure: ENDOSCOPIC RETROGRADE CHOLANGIOPANCREATOGRAM;  Surgeon: Mandeep Park MD;  Location: [facility]     ENDOSCOPIC RETROGRADE CHOLANGIOPANCREATOGRAM N/A 2016    Procedure: COMBINED ENDOSCOPIC RETROGRADE CHOLANGIOPANCREATOGRAPHY, PLACE TUBE/STENT;  Surgeon: Mandeep Park MD;  Location: [facility]     ENDOSCOPIC RETROGRADE CHOLANGIOPANCREATOGRAM N/A 3/17/2016    Procedure: COMBINED ENDOSCOPIC RETROGRADE CHOLANGIOPANCREATOGRAPHY, REMOVE FOREIGN BODY OR STENT/TUBE;  Surgeon: Mandeep Park MD;  Location: [facility]     ENDOSCOPIC RETROGRADE CHOLANGIOPANCREATOGRAM N/A 2016    Procedure: COMBINED ENDOSCOPIC RETROGRADE CHOLANGIOPANCREATOGRAPHY, PLACE TUBE/STENT;  Surgeon: Mandeep Park MD;  Location: [facility]     ENDOSCOPIC RETROGRADE CHOLANGIOPANCREATOGRAM N/A 2016    Procedure: COMBINED ENDOSCOPIC RETROGRADE CHOLANGIOPANCREATOGRAPHY, REMOVE FOREIGN BODY OR STENT/TUBE;  Surgeon:  Mandeep Park MD;  Location: [facility]     ENDOSCOPIC ULTRASOUND UPPER GASTROINTESTINAL TRACT (GI) N/A 10/3/2016    Procedure: ENDOSCOPIC ULTRASOUND, ESOPHAGOSCOPY / UPPER GASTROINTESTINAL TRACT (GI);  Surgeon: Guru Jose Rodas MD;  Location: [facility]     ESOPHAGOSCOPY, GASTROSCOPY, DUODENOSCOPY (EGD), COMBINED N/A 6/24/2015    Procedure: COMBINED ESOPHAGOSCOPY, GASTROSCOPY, DUODENOSCOPY (EGD), REMOVE FOREIGN BODY;  Surgeon: Mandeep Park MD;  Location: [facility]     ESOPHAGOSCOPY, GASTROSCOPY, DUODENOSCOPY (EGD), COMBINED N/A 10/25/2015    Procedure: COMBINED ESOPHAGOSCOPY, GASTROSCOPY, DUODENOSCOPY (EGD);  Surgeon: Sammy Amaro MD;  Location: [facility]     ESOPHAGOSCOPY, GASTROSCOPY, DUODENOSCOPY (EGD), COMBINED N/A 10/25/2015    Procedure: COMBINED ESOPHAGOSCOPY, GASTROSCOPY, DUODENOSCOPY (EGD), BIOPSY SINGLE OR MULTIPLE;  Surgeon: Sammy Amaro MD;  Location: [facility]     ESOPHAGOSCOPY, GASTROSCOPY, DUODENOSCOPY (EGD), DILATATION, COMBINED       EXCISE LESION TRUNK N/A 4/17/2017    Procedure: EXCISE LESION TRUNK;  Removal of Abdominal Foreign Body;  Surgeon: Nestor Phoenix MD;  [facility]     HC ESOPH/GAS REFLUX TEST W NASAL IMPED >1 HR N/A 11/19/2015    Procedure: ESOPHAGEAL IMPEDENCE FUNCTION TEST WITH 24 HOUR PH GREATER THAN 1 HOUR;  Surgeon: Thiago Apple MD;  Location: [facility]     HC UGI ENDOSCOPY DIAG W BIOPSY  9/17/08     HC UGI ENDOSCOPY DIAG W BIOPSY  9/27/12     HC UGI ENDOSCOPY W ESOPHAGEAL DILATION BALLOON <30MM  9/17/08     HC UGI ENDOSCOPY W EUS N/A 5/5/2015    Procedure: COMBINED ENDOSCOPIC ULTRASOUND, ESOPHAGOSCOPY, GASTROSCOPY, DUODENOSCOPY (EGD);  Surgeon: Wm Dueñas MD;  Location: [facility]     HC WRIST ARTHROSCOP,RELEASE XVERS LIG Bilateral 12/17/08     INJECT TRANSVERSUS ABDOMINIS PLANE (TAP) BLOCK BILATERAL Left 9/22/2016    Procedure: INJECT TRANSVERSUS ABDOMINIS PLANE (TAP) BLOCK BILATERAL;  Surgeon: Dickson Corrigan MD;  Location:   OR     laparoscopic pineda       LAPAROSCOPIC HERNIORRHAPHY INCISIONAL N/A 2018    Procedure: LAPAROSCOPIC HERNIORRHAPHY INCISIONAL;  Laparoscopic Incisional Hernia Repair with Symbotex Mesh Implant;  Surgeon: Nestor Phoenix MD;  [facility]: [facility]     LAPAROSCOPIC PANCREATECTOMY, TRANSPLANT AUTO ISLET CELL N/A 2016    Procedure: LAPAROSCOPIC PANCREATECTOMY, TRANSPLANT AUTO ISLET CELL;  Surgeon: Nestor Phoenix MD;  [facility]     REPLACE GASTROJEJUNOSTOMY TUBE, PERCUTANEOUS N/A 2021    Procedure: GASTROJEJUNOSTOMY TUBE PLACEMENT, PERCUTANEOUS, WITH GASTROPEXY;  Surgeon: Mandeep Park MD;  [facility]     REPLACE GASTROJEJUNOSTOMY TUBE, PERCUTANEOUS N/A 2021    Procedure: REPLACEMENT, GASTROJEJUNOSTOMY TUBE, PERCUTANEOUS;  Surgeon: Zackery Montoya MD;  [facility]     REPLACE JEJUNOSTOMY TUBE, PERCUTANEOUS N/A 9/10/2021    Procedure: UPPER ENDOSCOPY, REPLACEMENT OF PERCUTANEOUS GASTROJEJUNOSTOMY TUBE, T-TAG GASTROPEXY;  Surgeon: Zackery Montoya MD;  Location: [facility] OR     transphenoidal pituitary resection              Social History:     Social History     Occupational History     Occupation: director     Employer: [employer]   Tobacco Use     Smoking status: Former Smoker     Packs/day: 0.50     Years: 6.00     Pack years: 3.00     Types: Cigarettes     Start date: 1985     Quit date: 1992     Years since quittin.7     Smokeless tobacco: Never Used     Tobacco comment: no 2nd hand   Substance and Sexual Activity     Alcohol use: Not Currently     Alcohol/week: 3.0 - 6.0 standard drinks     Types: 1 - 2 Glasses of wine, 1 - 2 Cans of beer, 1 - 2 Shots of liquor per week     Comment: none since IGG     Drug use: No     Sexual activity: Yes     Partners: Male     Birth control/protection: None     Comment: Hystectomy    Previous director at [employer], has been on disability since 2016. 6 years of smoking and quit in . Denies any ETOH.  and  lives in the [facility].          Family History:     Family History   Problem Relation Age of Onset     Eye Disorder Father         cataract, detached retina     Myocardial Infarction Father 60     Lipids Father      Cerebrovascular Disease Father      Depression Father      Substance Abuse Father      Anesthesia Reaction Father         stroke right after surgery     Cataracts Father      Osteoarthritis Father      Ulcerative Colitis Father      Lipids Mother      Hypertension Mother      Thyroid Disease Mother      Depression Mother      Angina Mother      GERD Mother      Skin Cancer Mother      Eye Disorder Son         ptosis     Eye Disorder Paternal Grandmother         cataract     Eye Disorder Paternal Grandfather         cataract     Diabetes Paternal Grandfather      Eye Disorder Maternal Grandmother         cataract     Thyroid Disease Maternal Grandmother      Coronary Artery Disease Sister         angioplasty     GERD Sister      Substance Abuse Sister      Depression Sister      Depression Son      Anxiety Disorder Son      Thyroid Disease Sister      Diabetes Maternal Grandfather      Depression Nephew      Anxiety Disorder Nephew      Thyroid Disease Nephew      Diabetes Type 2  Cousin         paternal cousin     Heart Disease Paternal Aunt      Diabetes Paternal Aunt      Diabetes Paternal Uncle      Heart Disease Paternal Uncle      Father with likely RA following with multiple rheumatologists. 3 children healthy. 1 sister with thyroid issues. Denies any Sjogren's SLE, Scleroderma.            Allergies:     Allergies   Allergen Reactions     Apple Anaphylaxis     Corticosteroids Other (See Comments)     All oral, IV and injectable steroids are contraindicated (unless in life threatening situations) in Islet Auto transplant recipients. They can cause irreversible loss of islet cell function. Please contact patient's transplant care coordinator ADI Gaffney RN at [phone]/pager [phone]  and/or endocrinologist prior to administration.       Depakote [Divalproex Sodium] Other (See Comments)     Chest pain     Zithromax [Azithromycin Dihydrate] Anaphylaxis     Noted in 4/7/08 ER     Darvocet [Propoxyphene N-Apap] Itching     Morphine Nausea and Vomiting and Rash     Nalbuphine Hcl Rash     RASH :nubaine     Zosyn [Piperacillin-Tazobactam In D5w] Rash     Possible allergy, did have a diffuse rash that seemed drug related but could have also been related to soap in the hospital.      Bactrim [Sulfamethoxazole W-Trimethoprim] Other (See Comments) and Nausea and Vomiting     Severely low liver function.     Cats      Compazine [Prochlorperazine] Other (See Comments)     Twitching. Takes Benedryl and is fine     Dust Mites      Grass      Ragweeds      Tape [Adhesive Tape] Blisters     Tramadol      Trees      Zofran [Ondansetron] Other (See Comments)     migraine     Flagyl [Metronidazole] Hives and Rash            Medications:     Current Outpatient Medications   Medication Sig Dispense Refill     acetaminophen (TYLENOL) 500 MG tablet Take 2 tablets (1,000 mg) by mouth every 8 hours       amitriptyline (ELAVIL) 10 MG tablet Take 10 mg by mouth At Bedtime Take with a 50mg tab for a total of 60mg       amitriptyline (ELAVIL) 50 MG tablet Take 1 tablet (50 mg) by mouth At Bedtime 90 tablet 3     azelastine (ASTELIN) 0.1 % nasal spray Spray 1 spray into both nostrils 2 times daily 1 Bottle 11     blood glucose (PAT CONTOUR NEXT) test strip Use to test blood sugar 6 times daily or as directed. 2 Box 11     blood glucose monitoring (PAT MICROLET) lancets Use to test blood sugar 6-8 times daily or as directed. 100 each 11     cetirizine (ZYRTEC) 10 MG tablet Take 1 tablet (10 mg) by mouth daily (Patient taking differently: Take 10 mg by mouth every morning ) 90 tablet 3     Continuous Blood Gluc Sensor (FREESTYLE LISA 2 SENSOR) MISC 1 each every 14 days 2 each 11     cyclobenzaprine (FLEXERIL) 10 MG tablet  1 tablet (10 mg) by Oral or Feeding Tube route 3 times daily 90 tablet 0     diphenhydrAMINE (BENADRYL ALLERGY) 25 MG capsule Take 1 capsule (25 mg) by mouth every 6 hours as needed for itching or allergies 56 capsule 0     estradiol (VAGIFEM) 10 MCG TABS vaginal tablet Place 1 tablet (10 mcg) vaginally twice a week 24 tablet 3     famotidine (PEPCID) 20 MG tablet Take 1 tablet (20 mg) by mouth 2 times daily (Patient taking differently: Take 20 mg by mouth every morning ) 180 tablet 3     glucose 40 % GEL gel Take 15-30 g by mouth every 15 minutes as needed for low blood sugar 3 Tube 2     ibuprofen (ADVIL/MOTRIN) 400 MG tablet Take 1 tablet (400 mg) by mouth every 6 hours as needed for moderate pain 30 tablet 0     levothyroxine (SYNTHROID) 137 MCG tablet Take 1 tablet (137 mcg) by mouth daily (Patient taking differently: Take 137 mcg by mouth every morning ) 90 tablet 3     montelukast (SINGULAIR) 10 MG tablet TAKE 1 TABLET(10 MG) BY MOUTH AT BEDTIME 90 tablet 3     omeprazole (PRILOSEC) 40 MG DR capsule Take 1 capsule (40 mg) by mouth 2 times daily (Patient taking differently: Take 40 mg by mouth every evening ) 180 capsule 3     order for DME Equipment being ordered:Cefaly 1 Device 0     polyethylene glycol (MIRALAX) 17 GM/Dose powder Take 17 g (1 capful) by mouth daily (Patient taking differently: Take 1 capful by mouth every morning ) 507 g 11     prochlorperazine (COMPAZINE) 5 MG tablet Take 1 tablet (5 mg) by mouth every 6 hours as needed Take two 5 mg tablets by mouth every six hours as needed for nausea. 60 tablet 1     promethazine (PHENERGAN) 25 MG tablet Take 1 tablet (25 mg) by mouth daily as needed 60 tablet 1     Prucalopride Succinate (MOTEGRITY) 2 MG TABS Take 2 mg by mouth daily (Patient taking differently: Take 2 mg by mouth every morning ) 30 tablet 11     scopolamine (TRANSDERM) 1 MG/3DAYS 72 hr patch Place 1 patch onto the skin every 72 hours 10 patch 11     senna-docusate  (SENOKOT-S;PERICOLACE) 8.6-50 MG per tablet Take 1-2 tablets by mouth 2 times daily (Patient taking differently: Take 1-2 tablets by mouth every morning ) 100 tablet 0     Sharps Container (BD SHARPS ) MISC 1 Container as needed 1 each 1     ZOLMitriptan (ZOMIG) 5 MG tablet Take 1 tablet (5 mg) by mouth at onset of headache for migraine 9 tablet 11            Physical Exam:     Wt Readings from Last 4 Encounters:   09/18/21 59 kg (130 lb)   09/16/21 59.9 kg (132 lb)   09/12/21 60.2 kg (132 lb 12.8 oz)   08/09/21 63.5 kg (140 lb)     Constitutional: NAD, pleasant  Eyes: nl EOM, conjunctiva, sclera.   MS: no active synovitis   Skin: no rash  Neuro: nl cranial nerves   Psych: nl affect.         Data:     No results found for any visits on 09/30/21.    Recent Labs   Lab Test  04/25/17   1355  04/04/17   0757  02/21/17   1315   12/29/16   0620   06/06/15   1903   10/23/12   1451   WBC  4.4  5.0  4.7   < >  10.7   < >   --    < >   --    RBC  4.38  4.04  4.28   < >  3.13*   < >   --    < >   --    HGB  11.1*  10.4*  11.6*   < >  9.1*   < >   --    < >   --    HCT  36.1  33.4*  37.1   < >  27.7*   < >   --    < >   --    MCV  82  83  87   < >  89   < >   --    < >   --    RDW  17.5*  15.8*  13.0   < >  13.2   < >   --    < >   --    PLT  543*  482*  733*   < >  107*   < >   --    < >   --    ALBUMIN  3.7  3.2*  3.7   < >  2.9*   < >  2.8*   < >  4.4   CRP   --    --    --    --   90.0*   --   71.0*   --   7.2   BUN  16  17  13   < >  7   < >  2*   < >   --     < > = values in this interval not displayed.      Recent Labs   Lab Test  12/20/16   1121  10/23/15   1554  10/09/13   0721   06/25/09   1423   TSH  0.13*  0.57  1.86   < >  0.37*   T4  1.17   --    --    --   1.17    < > = values in this interval not displayed.     Hemoglobin   Date Value Ref Range Status   09/24/2021 14.3 11.7 - 15.7 g/dL Final   09/23/2021 13.2 11.7 - 15.7 g/dL Final   09/22/2021 13.0 11.7 - 15.7 g/dL Final   12/17/2020 13.5 11.7 - 15.7  g/dL Final   09/14/2020 14.3 11.7 - 15.7 g/dL Final   01/31/2020 14.3 11.7 - 15.7 g/dL Final     Urea Nitrogen   Date Value Ref Range Status   09/24/2021 11 7 - 30 mg/dL Final   09/23/2021 8 7 - 30 mg/dL Final   09/22/2021 9 7 - 30 mg/dL Final   12/17/2020 9 7 - 30 mg/dL Final   01/31/2020 10 7 - 30 mg/dL Final   09/13/2019 14 7 - 30 mg/dL Final     Creatinine   Date Value Ref Range Status   05/20/2017 0.6 0.52 - 1.04 mg/dL Final     Sed Rate   Date Value Ref Range Status   10/23/2012 11 0 - 20 mm/h Final   07/27/2006 9 0 - 20 mm/h Final     Erythrocyte Sedimentation Rate   Date Value Ref Range Status   09/16/2021 10 0 - 30 mm/hr Final     CRP Inflammation   Date Value Ref Range Status   12/29/2016 90.0 (H) 0.0 - 8.0 mg/L Final   06/06/2015 71.0 (H) 0.0 - 8.0 mg/L Final   10/23/2012 7.2 0.0 - 8.0 mg/L Final     AST   Date Value Ref Range Status   09/23/2021 35 0 - 45 U/L Final   09/22/2021 38 0 - 45 U/L Final   09/21/2021 38 0 - 45 U/L Final   01/11/2021 24 0 - 45 U/L Final   12/17/2020 63 (H) 0 - 45 U/L Final   07/30/2020 28 0 - 45 U/L Final     Albumin   Date Value Ref Range Status   09/23/2021 3.2 (L) 3.4 - 5.0 g/dL Final   09/22/2021 3.4 3.4 - 5.0 g/dL Final   09/21/2021 3.1 (L) 3.4 - 5.0 g/dL Final   12/17/2020 3.9 3.4 - 5.0 g/dL Final   07/30/2020 3.9 3.4 - 5.0 g/dL Final   01/31/2020 4.1 3.4 - 5.0 g/dL Final     Alkaline Phosphatase   Date Value Ref Range Status   09/23/2021 122 40 - 150 U/L Final   09/22/2021 116 40 - 150 U/L Final   09/21/2021 107 40 - 150 U/L Final   12/17/2020 108 40 - 150 U/L Final   07/30/2020 100 40 - 150 U/L Final   01/31/2020 106 40 - 150 U/L Final     ALT   Date Value Ref Range Status   09/23/2021 63 (H) 0 - 50 U/L Final   09/22/2021 74 (H) 0 - 50 U/L Final   09/21/2021 66 (H) 0 - 50 U/L Final   01/11/2021 52 (H) 0 - 50 U/L Final   12/17/2020 94 (H) 0 - 50 U/L Final   07/30/2020 45 0 - 50 U/L Final     Rheumatoid Factor   Date Value Ref Range Status   04/10/2017 <20 <20 IU/mL Final      Recent Labs   Lab Test 09/24/21  0624 09/23/21  0608 09/22/21  0603 09/21/21  0632 09/21/21  0632 09/18/21  1717 09/16/21  1308 12/17/20  0739 09/14/20  1529 04/05/19  1316 02/19/19  1651   WBC 6.7 6.6 5.5   < > 4.9   < > 6.5   < > 6.2   < >  --    HGB 14.3 13.2 13.0   < > 12.5   < > 13.0   < > 14.3   < >  --    HCT 43.1 40.1 39.1   < > 38.4   < > 39.7   < > 42.9   < >  --    MCV 94 95 95   < > 96   < > 96   < > 98   < >  --    * 457* 441   < > 419   < > 387   < > 353   < >  --    BUN 11 8 9   < > 10   < >  --    < >  --    < >  --    TSH  --   --   --   --   --   --  0.67  --  2.41  --  1.83   AST  --  35 38  --  38   < >  --    < >  --    < >  --    ALT  --  63* 74*  --  66*   < >  --    < >  --    < >  --    ALKPHOS  --  122 116  --  107   < >  --    < >  --    < >  --     < > = values in this interval not displayed.     AMARI undetected  SSA, SSB, cryoglobulin all negative  Normal C3, C4  RF undetected  IGG subclasses with mild IGG4 elevation to 146H -> 89 -> 70s  SPEP with normal pattern and no monoclonal protein seen.    Recent LFT normal with declining Alk Phos. Lowest in past year  IgG4 normalizing    LFTs, CBC, IgG subclasses normal 1/2018.    Other studies:   Tissue biopsy 12/28/2016:  FINAL DIAGNOSIS:   A. Spleen, splenectomy:   - Splenic tissue with no significant histologic abnormality     B. Duodenum and pancreas, resection:   - Chronic pancreatitis   - Duodenum with no significant histologic abnormality     C. Pancreas, uncinate process, biopsy:   - Chronic pancreatitis     D. Liver, dome lesion, needle core biopsy:   - Liver with spindle cell proliferation/lesion, acute and chronic   inflammation and fibrosis   - Focally increased IgG4 positive plasma cells (upto 60/HPF)   - See comment     E. Pancreas, head, biopsy:   - Chronic pancreatitis     F. Pancreas, tail, biopsy:   - Chronic pancreatitis     G. Pancreas, body, biopsy:   - Chronic pancreatitis with fat necrosis     COMMENT:    Sections of the liver lesion show stromal proliferation composed of   bland spindle cell bundle and whorls with associated fibrosis   infiltrated by neutrophils, lymphocytes, plasma cells and macrophages.   Occasional lymphoid aggregates are noted. The surrounding liver   parenchymal tissue show diffuse moderate portal inflammation composed of   lymphocytes and plasma cells.  There is also a mild to moderate   infiltrate of neutrophils.  Reticulin stain shows preserved reticulin   framework.  Trichrome stains highlight the spindle cell proliferation   and show mild portal fibrosis.  Immunostain are performed with   appropriate controls. The spindle cell proliferation is negative for ALK   and show patchy infiltrate of IgG4 positive plasma cells (upto 60/HPF).   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 Possibility of IgG4-related disease cannot be ruled out.   Clinical and radiologic correlation is recommended.     MRI Abdomen:4/22/2017  IMPRESSION:  1. 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  2. No significant change in small wedge-shaped peripheral areas of  persistent enhancement surrounding mildly dilated biliary radicles,  findings most compatible with cholangitis or sequela of previous  infection.  3. Nonspecific subcutaneous fluid collection in the left upper  quadrant amidst the abdominal wall postsurgical changes.    Reviewed Rheumatology lab flowsheet    MRCP 7/13/2017  IMPRESSION:  1. Findings consistent with hemosiderin deposition within the liver.  2. There is improvement in the heterogeneous enhancement seen on  comparison exam, though patchy areas of increased T2 signal and  persistent enhancement are seen in the liver suggestive of regions of  fibrosis. Mildly dilated bile ducts in the regions of the liver which  appears somewhat fibrotic, overall similar to prior exam.  3. Postsurgical changes of pancreatectomy,  splenectomy and  cholecystectomy.    Lip Biopsy 5/31/2017:  FINAL DIAGNOSIS:   Lip biopsy, lower:   -  Benign minor salivary glands with normal lobular architecture, see   comment     COMMENT:   There is focal mild chronic inflammation.  There is no morphologic   evidence of IgG-4 related disease or Sjogren's disease.     Component      Latest Ref Rng & Units 7/31/2018 8/23/2018 8/23/2018 8/23/2018            5:30 AM 10:32 AM 10:35 AM   WBC      4.0 - 11.0 10e9/L 5.6      RBC Count      3.8 - 5.2 10e12/L 4.43      Hemoglobin      11.7 - 15.7 g/dL 13.7  13.0    Hematocrit      35.0 - 47.0 % 41.7  39.7    MCV      78 - 100 fl 94      MCH      26.5 - 33.0 pg 30.9      MCHC      31.5 - 36.5 g/dL 32.9      RDW      10.0 - 15.0 % 13.9      Platelet Count      150 - 450 10e9/L 377      Diff Method             % Neutrophils      %       % Lymphocytes      %       % Monocytes      %       % Eosinophils      %       % Basophils      %       % Immature Granulocytes      %       Nucleated RBCs      0 /100       Absolute Neutrophil      1.6 - 8.3 10e9/L       Absolute Lymphocytes      0.8 - 5.3 10e9/L       Absolute Monocytes      0.0 - 1.3 10e9/L       Absolute Eosinophils      0.0 - 0.7 10e9/L       Absolute Basophils      0.0 - 0.2 10e9/L       Abs Immature Granulocytes      0 - 0.4 10e9/L       Absolute Nucleated RBC             Sodium      133 - 144 mmol/L 138      Potassium      3.4 - 5.3 mmol/L 3.9      Chloride      94 - 109 mmol/L 105      Carbon Dioxide      20 - 32 mmol/L 28      Anion Gap      3 - 14 mmol/L 5      Glucose      70 - 99 mg/dL 91 87  94   Urea Nitrogen      7 - 30 mg/dL 14      Creatinine      0.52 - 1.04 mg/dL 0.68      GFR Estimate      >60 mL/min/1.7m2 >90      GFR Estimate If Black      >60 mL/min/1.7m2 >90      Calcium      8.5 - 10.1 mg/dL 9.0      Bilirubin Total      0.2 - 1.3 mg/dL 0.7      Albumin      3.4 - 5.0 g/dL 3.7      Protein Total      6.8 - 8.8 g/dL 7.6      Alkaline Phosphatase       40 - 150 U/L 170 (H)      ALT      0 - 50 U/L 77 (H)      AST      0 - 45 U/L 69 (H)      Color Urine       Straw      Appearance Urine       Clear      Glucose Urine      NEG:Negative mg/dL Negative      Bilirubin Urine      NEG:Negative Negative      Ketones Urine      NEG:Negative mg/dL Negative      Specific Gravity Urine      1.003 - 1.035 1.003      Blood Urine      NEG:Negative Negative      pH Urine      5.0 - 7.0 pH 7.0      Protein Albumin Urine      NEG:Negative mg/dL Negative      Urobilinogen mg/dL      0.0 - 2.0 mg/dL 0.0      Nitrite Urine      NEG:Negative Negative      Leukocyte Esterase Urine      NEG:Negative Negative      Source       Midstream Urine      WBC Urine      0 - 5 /HPF <1      RBC Urine      0 - 2 /HPF <1      Bacteria Urine      NEG:Negative /HPF Few (A)      Mucous Urine      NEG:Negative /LPF Present (A)      Bilirubin Direct      0.0 - 0.2 mg/dL       IGG      695 - 1620 mg/dL       IgG1      300 - 856 mg/dL       IgG2      158 - 761 mg/dL       IgG3      24 - 192 mg/dL       IgG4      11 - 86 mg/dL         Component      Latest Ref Rng & Units 8/23/2018 8/23/2018 8/24/2018          12:38 PM  2:46 PM    WBC      4.0 - 11.0 10e9/L      RBC Count      3.8 - 5.2 10e12/L      Hemoglobin      11.7 - 15.7 g/dL 13.0  13.5   Hematocrit      35.0 - 47.0 % 39.6  41.7   MCV      78 - 100 fl      MCH      26.5 - 33.0 pg      MCHC      31.5 - 36.5 g/dL      RDW      10.0 - 15.0 %      Platelet Count      150 - 450 10e9/L      Diff Method            % Neutrophils      %      % Lymphocytes      %      % Monocytes      %      % Eosinophils      %      % Basophils      %      % Immature Granulocytes      %      Nucleated RBCs      0 /100      Absolute Neutrophil      1.6 - 8.3 10e9/L      Absolute Lymphocytes      0.8 - 5.3 10e9/L      Absolute Monocytes      0.0 - 1.3 10e9/L      Absolute Eosinophils      0.0 - 0.7 10e9/L      Absolute Basophils      0.0 - 0.2 10e9/L      Abs Immature  Granulocytes      0 - 0.4 10e9/L      Absolute Nucleated RBC            Sodium      133 - 144 mmol/L      Potassium      3.4 - 5.3 mmol/L      Chloride      94 - 109 mmol/L      Carbon Dioxide      20 - 32 mmol/L      Anion Gap      3 - 14 mmol/L      Glucose      70 - 99 mg/dL  104 (H) 85   Urea Nitrogen      7 - 30 mg/dL      Creatinine      0.52 - 1.04 mg/dL      GFR Estimate      >60 mL/min/1.7m2      GFR Estimate If Black      >60 mL/min/1.7m2      Calcium      8.5 - 10.1 mg/dL      Bilirubin Total      0.2 - 1.3 mg/dL      Albumin      3.4 - 5.0 g/dL      Protein Total      6.8 - 8.8 g/dL      Alkaline Phosphatase      40 - 150 U/L      ALT      0 - 50 U/L      AST      0 - 45 U/L      Color Urine            Appearance Urine            Glucose Urine      NEG:Negative mg/dL      Bilirubin Urine      NEG:Negative      Ketones Urine      NEG:Negative mg/dL      Specific Gravity Urine      1.003 - 1.035      Blood Urine      NEG:Negative      pH Urine      5.0 - 7.0 pH      Protein Albumin Urine      NEG:Negative mg/dL      Urobilinogen mg/dL      0.0 - 2.0 mg/dL      Nitrite Urine      NEG:Negative      Leukocyte Esterase Urine      NEG:Negative      Source            WBC Urine      0 - 5 /HPF      RBC Urine      0 - 2 /HPF      Bacteria Urine      NEG:Negative /HPF      Mucous Urine      NEG:Negative /LPF      Bilirubin Direct      0.0 - 0.2 mg/dL      IGG      695 - 1620 mg/dL      IgG1      300 - 856 mg/dL      IgG2      158 - 761 mg/dL      IgG3      24 - 192 mg/dL      IgG4      11 - 86 mg/dL        Component      Latest Ref Rng & Units 9/13/2018             WBC      4.0 - 11.0 10e9/L 5.8   RBC Count      3.8 - 5.2 10e12/L 4.51   Hemoglobin      11.7 - 15.7 g/dL 14.1   Hematocrit      35.0 - 47.0 % 42.1   MCV      78 - 100 fl 93   MCH      26.5 - 33.0 pg 31.3   MCHC      31.5 - 36.5 g/dL 33.5   RDW      10.0 - 15.0 % 13.2   Platelet Count      150 - 450 10e9/L 430   Diff Method       Automated Method   %  Neutrophils      % 37.1   % Lymphocytes      % 40.5   % Monocytes      % 10.5   % Eosinophils      % 9.8   % Basophils      % 2.1   % Immature Granulocytes      % 0.0   Nucleated RBCs      0 /100 0   Absolute Neutrophil      1.6 - 8.3 10e9/L 2.2   Absolute Lymphocytes      0.8 - 5.3 10e9/L 2.4   Absolute Monocytes      0.0 - 1.3 10e9/L 0.6   Absolute Eosinophils      0.0 - 0.7 10e9/L 0.6   Absolute Basophils      0.0 - 0.2 10e9/L 0.1   Abs Immature Granulocytes      0 - 0.4 10e9/L 0.0   Absolute Nucleated RBC       0.0   Sodium      133 - 144 mmol/L 138   Potassium      3.4 - 5.3 mmol/L 4.3   Chloride      94 - 109 mmol/L 102   Carbon Dioxide      20 - 32 mmol/L 30   Anion Gap      3 - 14 mmol/L 5   Glucose      70 - 99 mg/dL 119 (H)   Urea Nitrogen      7 - 30 mg/dL 12   Creatinine      0.52 - 1.04 mg/dL 0.83   GFR Estimate      >60 mL/min/1.7m2 72   GFR Estimate If Black      >60 mL/min/1.7m2 87   Calcium      8.5 - 10.1 mg/dL 9.2   Bilirubin Total      0.2 - 1.3 mg/dL 0.8   Albumin      3.4 - 5.0 g/dL 3.9   Protein Total      6.8 - 8.8 g/dL 7.8   Alkaline Phosphatase      40 - 150 U/L 145   ALT      0 - 50 U/L 59 (H)   AST      0 - 45 U/L 39   Color Urine          Appearance Urine          Glucose Urine      NEG:Negative mg/dL    Bilirubin Urine      NEG:Negative    Ketones Urine      NEG:Negative mg/dL    Specific Gravity Urine      1.003 - 1.035    Blood Urine      NEG:Negative    pH Urine      5.0 - 7.0 pH    Protein Albumin Urine      NEG:Negative mg/dL    Urobilinogen mg/dL      0.0 - 2.0 mg/dL    Nitrite Urine      NEG:Negative    Leukocyte Esterase Urine      NEG:Negative    Source          WBC Urine      0 - 5 /HPF    RBC Urine      0 - 2 /HPF    Bacteria Urine      NEG:Negative /HPF    Mucous Urine      NEG:Negative /LPF    Bilirubin Direct      0.0 - 0.2 mg/dL 0.2   IGG      695 - 1620 mg/dL 1160   IgG1      300 - 856 mg/dL 416   IgG2      158 - 761 mg/dL 384   IgG3      24 - 192 mg/dL 83   IgG4      11  - 86 mg/dL 95 (H)     Component      Latest Ref Rng & Units 6/25/2019 9/13/2019   WBC      4.0 - 11.0 10e9/L  5.2   RBC Count      3.8 - 5.2 10e12/L  4.23   Hemoglobin      11.7 - 15.7 g/dL  13.7   Hematocrit      35.0 - 47.0 %  40.9   MCV      78 - 100 fl  97   MCH      26.5 - 33.0 pg  32.4   MCHC      31.5 - 36.5 g/dL  33.5   RDW      10.0 - 15.0 %  13.5   Platelet Count      150 - 450 10e9/L  336   Diff Method        Automated Method   % Neutrophils      %  39.3   % Lymphocytes      %  40.6   % Monocytes      %  12.8   % Eosinophils      %  5.2   % Basophils      %  2.1   % Immature Granulocytes      %  0.0   Nucleated RBCs      0 /100  0   Absolute Neutrophil      1.6 - 8.3 10e9/L  2.0   Absolute Lymphocytes      0.8 - 5.3 10e9/L  2.1   Absolute Monocytes      0.0 - 1.3 10e9/L  0.7   Absolute Eosinophils      0.0 - 0.7 10e9/L  0.3   Absolute Basophils      0.0 - 0.2 10e9/L  0.1   Abs Immature Granulocytes      0 - 0.4 10e9/L  0.0   Absolute Nucleated RBC        0.0   Sodium      133 - 144 mmol/L  138   Potassium      3.4 - 5.3 mmol/L  4.1   Chloride      94 - 109 mmol/L  104   Carbon Dioxide      20 - 32 mmol/L  29   Anion Gap      3 - 14 mmol/L  4   Glucose      70 - 99 mg/dL  93   Urea Nitrogen      7 - 30 mg/dL  14   Creatinine      0.52 - 1.04 mg/dL  0.76   GFR Estimate      >60 mL/min/1.73:m2  89   GFR Estimate If Black      >60 mL/min/1.73:m2  >90   Calcium      8.5 - 10.1 mg/dL  9.1   Bilirubin Total      0.2 - 1.3 mg/dL 0.7 0.8   Albumin      3.4 - 5.0 g/dL 4.0 4.2   Protein Total      6.8 - 8.8 g/dL 7.4 7.4   Alkaline Phosphatase      40 - 150 U/L 123 113   ALT      0 - 50 U/L 65 (H) 74 (H)   AST      0 - 45 U/L 56 (H) 45   Bilirubin Direct      0.0 - 0.2 mg/dL 0.2    IGG      695 - 1,620 mg/dL 963 976   IgG1      382 - 929 mg/dL 417 402   IgG2      242 - 700 mg/dL 364 337   IgG3      22 - 176 mg/dL 67 68   IgG4      4 - 86 mg/dL 89 (H) 87 (H)     Component      Latest Ref Rng & Units 9/17/2019    Bilirubin Direct      0.0 - 0.2 mg/dL 0.1   Bilirubin Total      0.2 - 1.3 mg/dL 0.5   Albumin      3.4 - 5.0 g/dL 4.0   Protein Total      6.8 - 8.8 g/dL 7.4   Alkaline Phosphatase      40 - 150 U/L 113   ALT      0 - 50 U/L 68 (H)   AST      0 - 45 U/L 32   IGG      695 - 1,620 mg/dL 953   IgG1      382 - 929 mg/dL 390   IgG2      242 - 700 mg/dL 349   IgG3      22 - 176 mg/dL 64   IgG4      4 - 86 mg/dL 86     Component      Latest Ref Rng & Units 8/5/2021   IGG      610-1,616 mg/dL 983   IgG1      382 - 929 mg/dL 437   IgG2      242 - 700 mg/dL 359   IgG3      22 - 176 mg/dL 62   IgG4      4 - 86 mg/dL 91 (H)   Subclasses, Percent      % 97     Component      Latest Ref Rng & Units 9/21/2021   Sodium      133 - 144 mmol/L 138   Potassium      3.4 - 5.3 mmol/L 3.9   Chloride      94 - 109 mmol/L 103   Carbon Dioxide      20 - 32 mmol/L 31   Anion Gap      3 - 14 mmol/L 4   Urea Nitrogen      7 - 30 mg/dL 10   Creatinine      0.52 - 1.04 mg/dL 0.78   Calcium      8.5 - 10.1 mg/dL 9.0   Glucose      70 - 99 mg/dL 91   Alkaline Phosphatase      40 - 150 U/L 107   AST      0 - 45 U/L 38   ALT      0 - 50 U/L 66 (H)   Protein Total      6.8 - 8.8 g/dL 6.5 (L)   Albumin      3.4 - 5.0 g/dL 3.1 (L)   Bilirubin Total      0.2 - 1.3 mg/dL 0.5   GFR Estimate      >60 mL/min/1.73m2 86     Component      Latest Ref Rng & Units 9/24/2021   Sodium      133 - 144 mmol/L 137   Potassium      3.4 - 5.3 mmol/L 4.1   Chloride      94 - 109 mmol/L 101   Carbon Dioxide      20 - 32 mmol/L 31   Anion Gap      3 - 14 mmol/L 5   Urea Nitrogen      7 - 30 mg/dL 11   Creatinine      0.52 - 1.04 mg/dL 0.58   Calcium      8.5 - 10.1 mg/dL 9.2   Glucose      70 - 99 mg/dL 116 (H)   GFR Estimate      >60 mL/min/1.73m2 >90   WBC      4.0 - 11.0 10e3/uL 6.7   RBC Count      3.80 - 5.20 10e6/uL 4.57   Hemoglobin      11.7 - 15.7 g/dL 14.3   Hematocrit      35.0 - 47.0 % 43.1   MCV      78 - 100 fL 94   MCH      26.5 - 33.0 pg 31.3   MCHC       31.5 - 36.5 g/dL 33.2   RDW      10.0 - 15.0 % 13.3   Platelet Count      150 - 450 10e3/uL 496 (H)

## 2021-09-30 NOTE — LETTER
9/30/2021       RE: Dinora Mcghee  [address] [facility] [address]     Dear Colleague,    Thank you for referring your patient, Dinora Mcghee, to the [facility] RHEUMATOLOGY CLINIC [place] at [facility]. Please see a copy of my visit note below.    Dinora is a 56 year old who is being evaluated via a billable video visit.      How would you like to obtain your AVS? MyChart  If the video visit is dropped, the invitation should be resent by: Text to cell phone: [phone]  Will anyone else be joining your video visit? No      Video Start Time: 8:41 AM  Video-Visit Details    Type of service:  Video Visit    Video End Time:8:52 AM    Originating Location (pt. 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   # h/o endometriosis  # h/o pituitary mass with 2/2 DI now s/p transsphenoidal resection in 1990  # hypothyroidism on synthroid.  # Fatigue in ill state    Discussion:  57 y/o  female with longstanding history of chronic pancreatitis s/p pancreatectomy with islet autotransplant 12/2016 who is overall improving but found to have elevated serum and > 60 HPF cells on liver biopsy staining. She has sicca symptoms for the past 5 years but no evidence of Sjogren's vs IgG4 related disease on lip biopsy. No other evidence of IgG4 related activity  with no RPF, aortitis, orbital disease, thyroid, pulmonary, or kidney disease. Interestingly, IgG4 disease can impact the hypophysis and she does have a history of pituitary issues back in 1990. Recent meta analysis assessing IgG4 serum sensitivity is 87.2% with 82% specificity (1). Her biopsy containing > 50 IgG4 staining plasma cells is highly suggestive of IgG4 related disease as well and meets recent proposed pathologic recommendations of IgG4 related disease (2). She meets many of the features for IgG4 related disease including tissue biopsy.     In regards to management of her IgG4 Related disease there are no randomized control trials and all medicines are investigational. The most recent evidence highlights that the IgG4 antibodies are pathologic and that medications that suppress B lymphocytes like CD-20 cells (Rituximab) creating the antibodies are the preferred therapy. This is especially important if the organ dysfunction can be a critical organ like the liver in a patient with recent surgeries and transplant. Currently her liver dysfunction has normalized and there is no evidence of ongoing IgG4 related disease at this time.     Overall, we agree with the high levels of stress and degree of fatigue with worsening HA during active work we recommend she go very slow with her part time work. She has been severely ill and it will take time to regain and retrain her stamina.      9/13/2019: Doing well, stable, improving LFTs/IgG-4. Dr. Lugo is ok with me following LFTS and seeing pt PRN.      9/2019 Plan:    -- agree with plan to continue to monitor off immune suppressives given ongoing stability in liver labs and improving IgG4 levels.   -- If liver dysfunction returns would need to decide with GI if we suspect related to IgG4 and if so then therapeutic options for IgG4 related disease would normally be Rituximab preferred (but has steroids (can cause irreversible damage to islet cells) with infusion)  vs MMF (no steroids but not ideal agent).  -- Patient deferred initiating Evoxac or restasis at this point in time.  -- discussed various lozenges that help sicca symptoms, was advised to try hydris colgate toothpaste  -- f/u with GI for N/V      9/30/2021 Plan (IgG4 in 8/2021 was 91 but only slightly above NL which is 86, not concerning and could be monitored for now, ALT improved to almost NL in 9/2021):    IgG4 check every year (due 8/2022)    Return in 2 years    Anita Becerra MD          Active Problem List:     Patient Active Problem List    Diagnosis Date Noted     Abdominal pain, generalized 09/18/2021     Priority: Medium     Adult failure to thrive 08/16/2021     Priority: Medium     Added automatically from request for surgery [id number]       Hypoglycemia 08/05/2021     Priority: Medium     Iron deficiency anemia 03/22/2019     Priority: Medium     Headache, chronic migraine without aura 09/18/2018     Priority: Medium     Chronic pain syndrome 09/18/2018     Priority: Medium     S/P hernia repair 08/23/2018     Priority: Medium     Incisional hernia, without obstruction or gangrene 05/20/2018     Priority: Medium     Adhesive capsulitis of shoulder, unspecified laterality 11/14/2017     Priority: Medium     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  Document previously scanned on 1/12/17.  Validation form completed and scanned.  Code Status reflects choices in most recent ACP document..  Confirmed/documented designated decision maker(s).  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 Provider to review and confirm.       GERD (gastroesophageal reflux disease) 12/01/2010     Priority: Medium     Lumbago 04/18/2005     Priority: Medium     History of L5-S1 degenerative disk disease.         Sensorineural hearing loss 01/10/2005     Priority: Medium     Problem list name updated by automated process. Provider to review       Vertiginous syndrome and labyrinthine disorder 01/10/2005     Priority: Medium     Problem list name updated by automated process. Provider to review  IMO Regulatory Load OCT 2020       Sprain and strain of other specified sites of hip and thigh 12/09/2002     Priority: Medium     Chondromalacia of patella 12/09/2002     Priority: Medium     Need for prophylactic immunotherapy      Priority: Medium     trees, grass, srw, dust mites, cat       Allergic rhinitis due to other allergen 12/21/2001     Priority: Medium     Hypothyroidism      Priority: Medium     Problem list name updated by automated process. Provider to review              History of Present Illness:   Dinora Mcghee is a 56 year old female with a past medical history of hypothyroidism, h/o pituitary mass and secondary DI s/p transphenoidal resection 1990, HLD, left knee OA, lumbar DDD, chronic pancreatitis for 30 years, now s/p islet cell transplantation who presents today for further evaluation of elevated IgG4 found both in serum and liver biopsy as well as sicca symptoms.     She notes that she has had longstanding issues with pancreatitis without any mention of aortitis, RPF, urinary obstruction. She denies any history of cancers, lymphoma or leukemia. No family history of autoimmune conditions other than possible RA in her dad. She had distant DI secondary  "from pituitary mass (s/p removal and not needing hormones) and joint wise has longstanding OA of left knee. She mentions that for the past 5 years she has had dry eye requiring eye drops. Sometimes related to her allergies she has to now regularly use eye drops. \"Feels like sand\" is rubbing in eyes. She also noticed ~ 5 years ago dry mouth where a cracker would not dissolve. She has to constantly carry water and drink it to keep her mouth dry. She uses sugar free gum and lozenges. Most of her clinical symptom burden has been related to her pancreas with frequent ERCP and concern for Sphincter of Oddi dysfunction. She had issues with significant pancreatic insuffiencey with malnutrition and weight loss. She underwent islet cell autotransplantation 12/28/2017 and was found to have elevated IgG4 cells in her liver biopsy. Subsequent IgG4 levels were elevated.    Interim history: 6/20/2017 - 8/4/2017  Comes in today frustrated about the multiple denials from the insurance about Rituximab for her IgG4 Related disease. She has had recent elevations of her LFTs and mentions that the last 3 days she has started to feel worse like before her pancreatectomy. Some nausea, no vomiting, but her appetite is lower. She denies any jaundice, but does not itching of her skin. She denies any fevers, chills, but has had 1 day of watery, nonbloody diarrhea. She had a recent MRCP with signs of biliary dilatation at ducts within the liver. She has a new hyperenhancement lesion in her liver with stability of other lesions. She is following with Dr. Lugo within the next week. She mentions that her dry eyes are getting worse.     Interim history: 8/4/2017 - 10/20/2017  After further discussion with GI and SOT regarding therapies Dinora's LFTs normalized and the consensus was to monitor and hold on immune suppressive therapies given the side effect profile. She has continued to improve and is now off insulin. She wonders about her known " slow emptying an gastroparesis has been acting up and she has had issues with N/V the past 2 weeks particularly after she lays down (worse in AM, but best midday). Otherwise things have been going well, denies any infections and her shoulder is almost back to normal.     Interim History: 10/20/2017 - 3/23/2018  Returns today feeling better. She has adjusted her diet with great improvement. Has noticed some fatigue the last several weeks, but denies any infectious symptoms. Has intermittent sharp pains in her left lower rib chest wall. This has been responding to tylenol. Denies any fevers or chills. She has noticed fatigue and tries to be very active with walking 3 miles. She mentions that she has to take naps now she never had before. She has been back part time as well that has worsened her control over her HA as well as her stamina levels.     9/2018: Sicca sx are managable on biotene and systane.    Has RUQ pain and nausea x 7days. Has migraines today.     9/13/2019:    Overall stable, tried to help pts with chronic illness as a .    Uses biotene products, chewing gums. Gastroparesis and GI is the same. Dryness of eyes is not so bad.    No rashes, hair loss, or oral ulcers.    Has plantar fascitis and knee crepitus.    Today 9/30/2021:    Had a hospitalization 9/18-9/24/2021 with following Dx:      Discharge Diagnoses     Acute on Chronic Abdominal Pain   GJ tube with dependence on enteral feeding   Gastroparesis  Chronic Pancreatitis s/p pancreatectomy with islet cell transplant   Depression  Hx of IgG4 pseudotumor      Abdominal pain resolved after change of G tube. Almost canceled this appointment as LFTs improved. No complaints today, feeling well.         Review of Systems:   A comprehensive ROS was done. Positives are per HPI.            Past Medical History:     Past Medical History:   Diagnosis Date     Allergic rhinitis, cause unspecified      Allergy to other foods     strawberries, apples,  celeries, alice, watermelon     Arthritis     left knee     Choledocholithiasis     long after cholecystectomy     Chronic abdominal pain      Chronic constipation      Chronic nausea      Chronic pancreatitis (H)      Degeneration of lumbar or lumbosacral intervertebral disc      Esophageal reflux     w/ hiatal hernia     Gastroparesis      Hiatal hernia      History of pituitary adenoma     s/p resection     Hypothyroidism      Migraines      Mild hyperlipidemia      On tube feeding diet     presence of GJ tube     Pancreatic disease     PD stricture, 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    Past Surgical History  Past Surgical History:   Procedure Laterality Date     ABDOMEN SURGERY      c sections: 93, 96, 98. endometriosis growth     APPENDECTOMY       C  DELIVERY ONLY       C  DELIVERY ONLY      repeat c section with incidental cystotomy with repair     C EXCIS PITUITARY,TRANSNASAL/SEPTAL      pituitary tumor removed for diabetes insipidus     C TOTAL ABDOM HYSTERECTOMY      w/ bilateral salpingoophorectomy       SECTION       COLONOSCOPY       ENDOSCOPIC RETROGRADE CHOLANGIOPANCREATOGRAM N/A 2015    Procedure: ENDOSCOPIC RETROGRADE CHOLANGIOPANCREATOGRAM;  Surgeon: Mandeep Park MD;  Location: [facility]     ENDOSCOPIC RETROGRADE CHOLANGIOPANCREATOGRAM N/A 2016    Procedure: COMBINED ENDOSCOPIC RETROGRADE CHOLANGIOPANCREATOGRAPHY, PLACE TUBE/STENT;  Surgeon: Mandeep Park MD;  Location: [facility]     ENDOSCOPIC RETROGRADE CHOLANGIOPANCREATOGRAM N/A 3/17/2016    Procedure: COMBINED ENDOSCOPIC RETROGRADE CHOLANGIOPANCREATOGRAPHY, REMOVE FOREIGN BODY OR STENT/TUBE;  Surgeon: Mandeep Park MD;  Location: [facility]     ENDOSCOPIC RETROGRADE CHOLANGIOPANCREATOGRAM N/A 2016     Procedure: COMBINED ENDOSCOPIC RETROGRADE CHOLANGIOPANCREATOGRAPHY, PLACE TUBE/STENT;  Surgeon: Mandeep Park MD;  Location: [facility]     ENDOSCOPIC RETROGRADE CHOLANGIOPANCREATOGRAM N/A 8/26/2016    Procedure: COMBINED ENDOSCOPIC RETROGRADE CHOLANGIOPANCREATOGRAPHY, REMOVE FOREIGN BODY OR STENT/TUBE;  Surgeon: Mandeep Park MD;  Location: [facility]     ENDOSCOPIC ULTRASOUND UPPER GASTROINTESTINAL TRACT (GI) N/A 10/3/2016    Procedure: ENDOSCOPIC ULTRASOUND, ESOPHAGOSCOPY / UPPER GASTROINTESTINAL TRACT (GI);  Surgeon: Guru Jose Rodas MD;  Location: [facility]     ESOPHAGOSCOPY, GASTROSCOPY, DUODENOSCOPY (EGD), COMBINED N/A 6/24/2015    Procedure: COMBINED ESOPHAGOSCOPY, GASTROSCOPY, DUODENOSCOPY (EGD), REMOVE FOREIGN BODY;  Surgeon: Mandeep Park MD;  Location: [facility]     ESOPHAGOSCOPY, GASTROSCOPY, DUODENOSCOPY (EGD), COMBINED N/A 10/25/2015    Procedure: COMBINED ESOPHAGOSCOPY, GASTROSCOPY, DUODENOSCOPY (EGD);  Surgeon: Sammy Amaro MD;  Location: [facility]     ESOPHAGOSCOPY, GASTROSCOPY, DUODENOSCOPY (EGD), COMBINED N/A 10/25/2015    Procedure: COMBINED ESOPHAGOSCOPY, GASTROSCOPY, DUODENOSCOPY (EGD), BIOPSY SINGLE OR MULTIPLE;  Surgeon: Samym Amaro MD;  Location: [facility]     ESOPHAGOSCOPY, GASTROSCOPY, DUODENOSCOPY (EGD), DILATATION, COMBINED       EXCISE LESION TRUNK N/A 4/17/2017    Procedure: EXCISE LESION TRUNK;  Removal of Abdominal Foreign Body;  Surgeon: Nestor Phoenix MD;  [facility]     HC ESOPH/GAS REFLUX TEST W NASAL IMPED >1 HR N/A 11/19/2015    Procedure: ESOPHAGEAL IMPEDENCE FUNCTION TEST WITH 24 HOUR PH GREATER THAN 1 HOUR;  Surgeon: Thiago Apple MD;  Location: [facility]     HC U[facility] ENDOSCOPY DIAG W BIOPSY  9/17/08     HC UGI ENDOSCOPY DIAG W BIOPSY  9/27/12     HC UGI ENDOSCOPY W ESOPHAGEAL DILATION BALLOON <30MM  9/17/08     HC UGI ENDOSCOPY W EUS N/A 5/5/2015    Procedure: COMBINED ENDOSCOPIC ULTRASOUND, ESOPHAGOSCOPY, GASTROSCOPY,  DUODENOSCOPY (EGD);  Surgeon: Wm Dueñas MD;  Location: [facility]     HC WRIST ARTHROSCOP,RELEASE XVERS LIG Bilateral 08     INJECT TRANSVERSUS ABDOMINIS PLANE (TAP) BLOCK BILATERAL Left 2016    Procedure: INJECT TRANSVERSUS ABDOMINIS PLANE (TAP) BLOCK BILATERAL;  Surgeon: Dickson Corrigan MD;  [facility]     laparoscopic pineda  1995     LAPAROSCOPIC HERNIORRHAPHY INCISIONAL N/A 2018    Procedure: LAPAROSCOPIC HERNIORRHAPHY INCISIONAL;  Laparoscopic Incisional Hernia Repair with Symbotex Mesh Implant;  Surgeon: Nestor Phoenix MD;  [facility]     LAPAROSCOPIC PANCREATECTOMY, TRANSPLANT AUTO ISLET CELL N/A 2016    Procedure: LAPAROSCOPIC PANCREATECTOMY, TRANSPLANT AUTO ISLET CELL;  Surgeon: Nestor Phoenix MD;  [facility]     REPLACE GASTROJEJUNOSTOMY TUBE, PERCUTANEOUS N/A 2021    Procedure: GASTROJEJUNOSTOMY TUBE PLACEMENT, PERCUTANEOUS, WITH GASTROPEXY;  Surgeon: Mandeep Park MD;  [facility]     REPLACE GASTROJEJUNOSTOMY TUBE, PERCUTANEOUS N/A 2021    Procedure: REPLACEMENT, GASTROJEJUNOSTOMY TUBE, PERCUTANEOUS;  Surgeon: Zackery Montoya MD;  [facility]     REPLACE JEJUNOSTOMY TUBE, PERCUTANEOUS N/A 9/10/2021    Procedure: UPPER ENDOSCOPY, REPLACEMENT OF PERCUTANEOUS GASTROJEJUNOSTOMY TUBE, T-TAG GASTROPEXY;  Surgeon: Zackery Montoya MD;  Location: [facility]     transphenoidal pituitary resection              Social History:     Social History     Occupational History     Occupation: director     Employer: [employer]   Tobacco Use     Smoking status: Former Smoker     Packs/day: 0.50     Years: 6.00     Pack years: 3.00     Types: Cigarettes     Start date: 1985     Quit date: 1992     Years since quittin.7     Smokeless tobacco: Never Used     Tobacco comment: no 2nd hand   Substance and Sexual Activity     Alcohol use: Not Currently     Alcohol/week: 3.0 - 6.0 standard drinks     Types: 1 - 2 Glasses of wine, 1 - 2 Cans  of beer, 1 - 2 Shots of liquor per week     Comment: none since IGG     Drug use: No     Sexual activity: Yes     Partners: Male     Birth control/protection: None     Comment: Hystectomy 1999   Previous director at [employer], has been on disability since 8/1/2016. 6 years of smoking and quit in 1992. Denies any ETOH.  and lives in the [facility].          Family History:     Family History   Problem Relation Age of Onset     Eye Disorder Father         cataract, detached retina     Myocardial Infarction Father 60     Lipids Father      Cerebrovascular Disease Father      Depression Father      Substance Abuse Father      Anesthesia Reaction Father         stroke right after surgery     Cataracts Father      Osteoarthritis Father      Ulcerative Colitis Father      Lipids Mother      Hypertension Mother      Thyroid Disease Mother      Depression Mother      Angina Mother      GERD Mother      Skin Cancer Mother      Eye Disorder Son         ptosis     Eye Disorder Paternal Grandmother         cataract     Eye Disorder Paternal Grandfather         cataract     Diabetes Paternal Grandfather      Eye Disorder Maternal Grandmother         cataract     Thyroid Disease Maternal Grandmother      Coronary Artery Disease Sister         angioplasty     GERD Sister      Substance Abuse Sister      Depression Sister      Depression Son      Anxiety Disorder Son      Thyroid Disease Sister      Diabetes Maternal Grandfather      Depression Nephew      Anxiety Disorder Nephew      Thyroid Disease Nephew      Diabetes Type 2  Cousin         paternal cousin     Heart Disease Paternal Aunt      Diabetes Paternal Aunt      Diabetes Paternal Uncle      Heart Disease Paternal Uncle      Father with likely RA following with multiple rheumatologists. 3 children healthy. 1 sister with thyroid issues. Denies any Sjogren's SLE, Scleroderma.            Allergies:     Allergies   Allergen Reactions     Apple Anaphylaxis      Corticosteroids Other (See Comments)     All oral, IV and injectable steroids are contraindicated (unless in life threatening situations) in Islet Auto transplant recipients. They can cause irreversible loss of islet cell function. Please contact patient's transplant care coordinator ADI Gaffney RN at [phone]/pager [phone] and/or endocrinologist prior to administration.       Depakote [Divalproex Sodium] Other (See Comments)     Chest pain     Zithromax [Azithromycin Dihydrate] Anaphylaxis     Noted in 4/7/08 ER     Darvocet [Propoxyphene N-Apap] Itching     Morphine Nausea and Vomiting and Rash     Nalbuphine Hcl Rash     RASH :nubaine     Zosyn [Piperacillin-Tazobactam In D5w] Rash     Possible allergy, did have a diffuse rash that seemed drug related but could have also been related to soap in the hospital.      Bactrim [Sulfamethoxazole W-Trimethoprim] Other (See Comments) and Nausea and Vomiting     Severely low liver function.     Cats      Compazine [Prochlorperazine] Other (See Comments)     Twitching. Takes Benedryl and is fine     Dust Mites      Grass      Ragweeds      Tape [Adhesive Tape] Blisters     Tramadol      Trees      Zofran [Ondansetron] Other (See Comments)     migraine     Flagyl [Metronidazole] Hives and Rash            Medications:     Current Outpatient Medications   Medication Sig Dispense Refill     acetaminophen (TYLENOL) 500 MG tablet Take 2 tablets (1,000 mg) by mouth every 8 hours       amitriptyline (ELAVIL) 10 MG tablet Take 10 mg by mouth At Bedtime Take with a 50mg tab for a total of 60mg       amitriptyline (ELAVIL) 50 MG tablet Take 1 tablet (50 mg) by mouth At Bedtime 90 tablet 3     azelastine (ASTELIN) 0.1 % nasal spray Spray 1 spray into both nostrils 2 times daily 1 Bottle 11     blood glucose (PAT CONTOUR NEXT) test strip Use to test blood sugar 6 times daily or as directed. 2 Box 11     blood glucose monitoring (PAT MICROLET) lancets Use to test blood  sugar 6-8 times daily or as directed. 100 each 11     cetirizine (ZYRTEC) 10 MG tablet Take 1 tablet (10 mg) by mouth daily (Patient taking differently: Take 10 mg by mouth every morning ) 90 tablet 3     Continuous Blood Gluc Sensor (FREESTYLE LISA 2 SENSOR) MISC 1 each every 14 days 2 each 11     cyclobenzaprine (FLEXERIL) 10 MG tablet 1 tablet (10 mg) by Oral or Feeding Tube route 3 times daily 90 tablet 0     diphenhydrAMINE (BENADRYL ALLERGY) 25 MG capsule Take 1 capsule (25 mg) by mouth every 6 hours as needed for itching or allergies 56 capsule 0     estradiol (VAGIFEM) 10 MCG TABS vaginal tablet Place 1 tablet (10 mcg) vaginally twice a week 24 tablet 3     famotidine (PEPCID) 20 MG tablet Take 1 tablet (20 mg) by mouth 2 times daily (Patient taking differently: Take 20 mg by mouth every morning ) 180 tablet 3     glucose 40 % GEL gel Take 15-30 g by mouth every 15 minutes as needed for low blood sugar 3 Tube 2     ibuprofen (ADVIL/MOTRIN) 400 MG tablet Take 1 tablet (400 mg) by mouth every 6 hours as needed for moderate pain 30 tablet 0     levothyroxine (SYNTHROID) 137 MCG tablet Take 1 tablet (137 mcg) by mouth daily (Patient taking differently: Take 137 mcg by mouth every morning ) 90 tablet 3     montelukast (SINGULAIR) 10 MG tablet TAKE 1 TABLET(10 MG) BY MOUTH AT BEDTIME 90 tablet 3     omeprazole (PRILOSEC) 40 MG DR capsule Take 1 capsule (40 mg) by mouth 2 times daily (Patient taking differently: Take 40 mg by mouth every evening ) 180 capsule 3     order for DME Equipment being ordered:Cefaly 1 Device 0     polyethylene glycol (MIRALAX) 17 GM/Dose powder Take 17 g (1 capful) by mouth daily (Patient taking differently: Take 1 capful by mouth every morning ) 507 g 11     prochlorperazine (COMPAZINE) 5 MG tablet Take 1 tablet (5 mg) by mouth every 6 hours as needed Take two 5 mg tablets by mouth every six hours as needed for nausea. 60 tablet 1     promethazine (PHENERGAN) 25 MG tablet Take 1 tablet  (25 mg) by mouth daily as needed 60 tablet 1     Prucalopride Succinate (MOTEGRITY) 2 MG TABS Take 2 mg by mouth daily (Patient taking differently: Take 2 mg by mouth every morning ) 30 tablet 11     scopolamine (TRANSDERM) 1 MG/3DAYS 72 hr patch Place 1 patch onto the skin every 72 hours 10 patch 11     senna-docusate (SENOKOT-S;PERICOLACE) 8.6-50 MG per tablet Take 1-2 tablets by mouth 2 times daily (Patient taking differently: Take 1-2 tablets by mouth every morning ) 100 tablet 0     Sharps Container (BD SHARPS ) MISC 1 Container as needed 1 each 1     ZOLMitriptan (ZOMIG) 5 MG tablet Take 1 tablet (5 mg) by mouth at onset of headache for migraine 9 tablet 11            Physical Exam:     Wt Readings from Last 4 Encounters:   09/18/21 59 kg (130 lb)   09/16/21 59.9 kg (132 lb)   09/12/21 60.2 kg (132 lb 12.8 oz)   08/09/21 63.5 kg (140 lb)     Constitutional: NAD, pleasant  Eyes: nl EOM, conjunctiva, sclera.   MS: no active synovitis   Skin: no rash  Neuro: nl cranial nerves   Psych: nl affect.         Data:     No results found for any visits on 09/30/21.    Recent Labs   Lab Test  04/25/17   1355  04/04/17   0757  02/21/17   1315   12/29/16   0620   06/06/15   1903   10/23/12   1451   WBC  4.4  5.0  4.7   < >  10.7   < >   --    < >   --    RBC  4.38  4.04  4.28   < >  3.13*   < >   --    < >   --    HGB  11.1*  10.4*  11.6*   < >  9.1*   < >   --    < >   --    HCT  36.1  33.4*  37.1   < >  27.7*   < >   --    < >   --    MCV  82  83  87   < >  89   < >   --    < >   --    RDW  17.5*  15.8*  13.0   < >  13.2   < >   --    < >   --    PLT  543*  482*  733*   < >  107*   < >   --    < >   --    ALBUMIN  3.7  3.2*  3.7   < >  2.9*   < >  2.8*   < >  4.4   CRP   --    --    --    --   90.0*   --   71.0*   --   7.2   BUN  16  17  13   < >  7   < >  2*   < >   --     < > = values in this interval not displayed.      Recent Labs   Lab Test  12/20/16   1121  10/23/15   1554  10/09/13   0721   06/25/09   1423    TSH  0.13*  0.57  1.86   < >  0.37*   T4  1.17   --    --    --   1.17    < > = values in this interval not displayed.     Hemoglobin   Date Value Ref Range Status   09/24/2021 14.3 11.7 - 15.7 g/dL Final   09/23/2021 13.2 11.7 - 15.7 g/dL Final   09/22/2021 13.0 11.7 - 15.7 g/dL Final   12/17/2020 13.5 11.7 - 15.7 g/dL Final   09/14/2020 14.3 11.7 - 15.7 g/dL Final   01/31/2020 14.3 11.7 - 15.7 g/dL Final     Urea Nitrogen   Date Value Ref Range Status   09/24/2021 11 7 - 30 mg/dL Final   09/23/2021 8 7 - 30 mg/dL Final   09/22/2021 9 7 - 30 mg/dL Final   12/17/2020 9 7 - 30 mg/dL Final   01/31/2020 10 7 - 30 mg/dL Final   09/13/2019 14 7 - 30 mg/dL Final     Creatinine   Date Value Ref Range Status   05/20/2017 0.6 0.52 - 1.04 mg/dL Final     Sed Rate   Date Value Ref Range Status   10/23/2012 11 0 - 20 mm/h Final   07/27/2006 9 0 - 20 mm/h Final     Erythrocyte Sedimentation Rate   Date Value Ref Range Status   09/16/2021 10 0 - 30 mm/hr Final     CRP Inflammation   Date Value Ref Range Status   12/29/2016 90.0 (H) 0.0 - 8.0 mg/L Final   06/06/2015 71.0 (H) 0.0 - 8.0 mg/L Final   10/23/2012 7.2 0.0 - 8.0 mg/L Final     AST   Date Value Ref Range Status   09/23/2021 35 0 - 45 U/L Final   09/22/2021 38 0 - 45 U/L Final   09/21/2021 38 0 - 45 U/L Final   01/11/2021 24 0 - 45 U/L Final   12/17/2020 63 (H) 0 - 45 U/L Final   07/30/2020 28 0 - 45 U/L Final     Albumin   Date Value Ref Range Status   09/23/2021 3.2 (L) 3.4 - 5.0 g/dL Final   09/22/2021 3.4 3.4 - 5.0 g/dL Final   09/21/2021 3.1 (L) 3.4 - 5.0 g/dL Final   12/17/2020 3.9 3.4 - 5.0 g/dL Final   07/30/2020 3.9 3.4 - 5.0 g/dL Final   01/31/2020 4.1 3.4 - 5.0 g/dL Final     Alkaline Phosphatase   Date Value Ref Range Status   09/23/2021 122 40 - 150 U/L Final   09/22/2021 116 40 - 150 U/L Final   09/21/2021 107 40 - 150 U/L Final   12/17/2020 108 40 - 150 U/L Final   07/30/2020 100 40 - 150 U/L Final   01/31/2020 106 40 - 150 U/L Final     ALT   Date Value  Ref Range Status   09/23/2021 63 (H) 0 - 50 U/L Final   09/22/2021 74 (H) 0 - 50 U/L Final   09/21/2021 66 (H) 0 - 50 U/L Final   01/11/2021 52 (H) 0 - 50 U/L Final   12/17/2020 94 (H) 0 - 50 U/L Final   07/30/2020 45 0 - 50 U/L Final     Rheumatoid Factor   Date Value Ref Range Status   04/10/2017 <20 <20 IU/mL Final     Recent Labs   Lab Test 09/24/21  0624 09/23/21  0608 09/22/21  0603 09/21/21  0632 09/21/21  0632 09/18/21  1717 09/16/21  1308 12/17/20  0739 09/14/20  1529 04/05/19  1316 02/19/19  1651   WBC 6.7 6.6 5.5   < > 4.9   < > 6.5   < > 6.2   < >  --    HGB 14.3 13.2 13.0   < > 12.5   < > 13.0   < > 14.3   < >  --    HCT 43.1 40.1 39.1   < > 38.4   < > 39.7   < > 42.9   < >  --    MCV 94 95 95   < > 96   < > 96   < > 98   < >  --    * 457* 441   < > 419   < > 387   < > 353   < >  --    BUN 11 8 9   < > 10   < >  --    < >  --    < >  --    TSH  --   --   --   --   --   --  0.67  --  2.41  --  1.83   AST  --  35 38  --  38   < >  --    < >  --    < >  --    ALT  --  63* 74*  --  66*   < >  --    < >  --    < >  --    ALKPHOS  --  122 116  --  107   < >  --    < >  --    < >  --     < > = values in this interval not displayed.     AMARI undetected  SSA, SSB, cryoglobulin all negative  Normal C3, C4  RF undetected  IGG subclasses with mild IGG4 elevation to 146H -> 89 -> 70s  SPEP with normal pattern and no monoclonal protein seen.    Recent LFT normal with declining Alk Phos. Lowest in past year  IgG4 normalizing    LFTs, CBC, IgG subclasses normal 1/2018.    Other studies:   Tissue biopsy 12/28/2016:  FINAL DIAGNOSIS:   A. Spleen, splenectomy:   - Splenic tissue with no significant histologic abnormality     B. Duodenum and pancreas, resection:   - Chronic pancreatitis   - Duodenum with no significant histologic abnormality     C. Pancreas, uncinate process, biopsy:   - Chronic pancreatitis     D. Liver, dome lesion, needle core biopsy:   - Liver with spindle cell proliferation/lesion, acute and  chronic   inflammation and fibrosis   - Focally increased IgG4 positive plasma cells (upto 60/HPF)   - See comment     E. Pancreas, head, biopsy:   - Chronic pancreatitis     F. Pancreas, tail, biopsy:   - Chronic pancreatitis     G. Pancreas, body, biopsy:   - Chronic pancreatitis with fat necrosis     COMMENT:   Sections of the liver lesion show stromal proliferation composed of   bland spindle cell bundle and whorls with associated fibrosis   infiltrated by neutrophils, lymphocytes, plasma cells and macrophages.   Occasional lymphoid aggregates are noted. The surrounding liver   parenchymal tissue show diffuse moderate portal inflammation composed of   lymphocytes and plasma cells.  There is also a mild to moderate   infiltrate of neutrophils.  Reticulin stain shows preserved reticulin   framework.  Trichrome stains highlight the spindle cell proliferation   and show mild portal fibrosis.  Immunostain are performed with   appropriate controls. The spindle cell proliferation is negative for ALK   and show patchy infiltrate of IgG4 positive plasma cells (upto 60/HPF).   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 Possibility of IgG4-related disease cannot be ruled out.   Clinical and radiologic correlation is recommended.     MRI Abdomen:4/22/2017  IMPRESSION:  1. 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  2. No significant change in small wedge-shaped peripheral areas of  persistent enhancement surrounding mildly dilated biliary radicles,  findings most compatible with cholangitis or sequela of previous  infection.  3. Nonspecific subcutaneous fluid collection in the left upper  quadrant amidst the abdominal wall postsurgical changes.    Reviewed Rheumatology lab flowsheet    MRCP 7/13/2017  IMPRESSION:  1. Findings consistent with hemosiderin deposition within the liver.  2. There is improvement in the heterogeneous enhancement seen  on  comparison exam, though patchy areas of increased T2 signal and  persistent enhancement are seen in the liver suggestive of regions of  fibrosis. Mildly dilated bile ducts in the regions of the liver which  appears somewhat fibrotic, overall similar to prior exam.  3. Postsurgical changes of pancreatectomy, splenectomy and  cholecystectomy.    Lip Biopsy 5/31/2017:  FINAL DIAGNOSIS:   Lip biopsy, lower:   -  Benign minor salivary glands with normal lobular architecture, see   comment     COMMENT:   There is focal mild chronic inflammation.  There is no morphologic   evidence of IgG-4 related disease or Sjogren's disease.     Component      Latest Ref Rng & Units 7/31/2018 8/23/2018 8/23/2018 8/23/2018            5:30 AM 10:32 AM 10:35 AM   WBC      4.0 - 11.0 10e9/L 5.6      RBC Count      3.8 - 5.2 10e12/L 4.43      Hemoglobin      11.7 - 15.7 g/dL 13.7  13.0    Hematocrit      35.0 - 47.0 % 41.7  39.7    MCV      78 - 100 fl 94      MCH      26.5 - 33.0 pg 30.9      MCHC      31.5 - 36.5 g/dL 32.9      RDW      10.0 - 15.0 % 13.9      Platelet Count      150 - 450 10e9/L 377      Diff Method             % Neutrophils      %       % Lymphocytes      %       % Monocytes      %       % Eosinophils      %       % Basophils      %       % Immature Granulocytes      %       Nucleated RBCs      0 /100       Absolute Neutrophil      1.6 - 8.3 10e9/L       Absolute Lymphocytes      0.8 - 5.3 10e9/L       Absolute Monocytes      0.0 - 1.3 10e9/L       Absolute Eosinophils      0.0 - 0.7 10e9/L       Absolute Basophils      0.0 - 0.2 10e9/L       Abs Immature Granulocytes      0 - 0.4 10e9/L       Absolute Nucleated RBC             Sodium      133 - 144 mmol/L 138      Potassium      3.4 - 5.3 mmol/L 3.9      Chloride      94 - 109 mmol/L 105      Carbon Dioxide      20 - 32 mmol/L 28      Anion Gap      3 - 14 mmol/L 5      Glucose      70 - 99 mg/dL 91 87  94   Urea Nitrogen      7 - 30 mg/dL 14      Creatinine       0.52 - 1.04 mg/dL 0.68      GFR Estimate      >60 mL/min/1.7m2 >90      GFR Estimate If Black      >60 mL/min/1.7m2 >90      Calcium      8.5 - 10.1 mg/dL 9.0      Bilirubin Total      0.2 - 1.3 mg/dL 0.7      Albumin      3.4 - 5.0 g/dL 3.7      Protein Total      6.8 - 8.8 g/dL 7.6      Alkaline Phosphatase      40 - 150 U/L 170 (H)      ALT      0 - 50 U/L 77 (H)      AST      0 - 45 U/L 69 (H)      Color Urine       Straw      Appearance Urine       Clear      Glucose Urine      NEG:Negative mg/dL Negative      Bilirubin Urine      NEG:Negative Negative      Ketones Urine      NEG:Negative mg/dL Negative      Specific Gravity Urine      1.003 - 1.035 1.003      Blood Urine      NEG:Negative Negative      pH Urine      5.0 - 7.0 pH 7.0      Protein Albumin Urine      NEG:Negative mg/dL Negative      Urobilinogen mg/dL      0.0 - 2.0 mg/dL 0.0      Nitrite Urine      NEG:Negative Negative      Leukocyte Esterase Urine      NEG:Negative Negative      Source       Midstream Urine      WBC Urine      0 - 5 /HPF <1      RBC Urine      0 - 2 /HPF <1      Bacteria Urine      NEG:Negative /HPF Few (A)      Mucous Urine      NEG:Negative /LPF Present (A)      Bilirubin Direct      0.0 - 0.2 mg/dL       IGG      695 - 1620 mg/dL       IgG1      300 - 856 mg/dL       IgG2      158 - 761 mg/dL       IgG3      24 - 192 mg/dL       IgG4      11 - 86 mg/dL         Component      Latest Ref Rng & Units 8/23/2018 8/23/2018 8/24/2018          12:38 PM  2:46 PM    WBC      4.0 - 11.0 10e9/L      RBC Count      3.8 - 5.2 10e12/L      Hemoglobin      11.7 - 15.7 g/dL 13.0  13.5   Hematocrit      35.0 - 47.0 % 39.6  41.7   MCV      78 - 100 fl      MCH      26.5 - 33.0 pg      MCHC      31.5 - 36.5 g/dL      RDW      10.0 - 15.0 %      Platelet Count      150 - 450 10e9/L      Diff Method            % Neutrophils      %      % Lymphocytes      %      % Monocytes      %      % Eosinophils      %      % Basophils      %      % Immature  Granulocytes      %      Nucleated RBCs      0 /100      Absolute Neutrophil      1.6 - 8.3 10e9/L      Absolute Lymphocytes      0.8 - 5.3 10e9/L      Absolute Monocytes      0.0 - 1.3 10e9/L      Absolute Eosinophils      0.0 - 0.7 10e9/L      Absolute Basophils      0.0 - 0.2 10e9/L      Abs Immature Granulocytes      0 - 0.4 10e9/L      Absolute Nucleated RBC            Sodium      133 - 144 mmol/L      Potassium      3.4 - 5.3 mmol/L      Chloride      94 - 109 mmol/L      Carbon Dioxide      20 - 32 mmol/L      Anion Gap      3 - 14 mmol/L      Glucose      70 - 99 mg/dL  104 (H) 85   Urea Nitrogen      7 - 30 mg/dL      Creatinine      0.52 - 1.04 mg/dL      GFR Estimate      >60 mL/min/1.7m2      GFR Estimate If Black      >60 mL/min/1.7m2      Calcium      8.5 - 10.1 mg/dL      Bilirubin Total      0.2 - 1.3 mg/dL      Albumin      3.4 - 5.0 g/dL      Protein Total      6.8 - 8.8 g/dL      Alkaline Phosphatase      40 - 150 U/L      ALT      0 - 50 U/L      AST      0 - 45 U/L      Color Urine            Appearance Urine            Glucose Urine      NEG:Negative mg/dL      Bilirubin Urine      NEG:Negative      Ketones Urine      NEG:Negative mg/dL      Specific Gravity Urine      1.003 - 1.035      Blood Urine      NEG:Negative      pH Urine      5.0 - 7.0 pH      Protein Albumin Urine      NEG:Negative mg/dL      Urobilinogen mg/dL      0.0 - 2.0 mg/dL      Nitrite Urine      NEG:Negative      Leukocyte Esterase Urine      NEG:Negative      Source            WBC Urine      0 - 5 /HPF      RBC Urine      0 - 2 /HPF      Bacteria Urine      NEG:Negative /HPF      Mucous Urine      NEG:Negative /LPF      Bilirubin Direct      0.0 - 0.2 mg/dL      IGG      695 - 1620 mg/dL      IgG1      300 - 856 mg/dL      IgG2      158 - 761 mg/dL      IgG3      24 - 192 mg/dL      IgG4      11 - 86 mg/dL        Component      Latest Ref Rng & Units 9/13/2018             WBC      4.0 - 11.0 10e9/L 5.8   RBC Count      3.8  - 5.2 10e12/L 4.51   Hemoglobin      11.7 - 15.7 g/dL 14.1   Hematocrit      35.0 - 47.0 % 42.1   MCV      78 - 100 fl 93   MCH      26.5 - 33.0 pg 31.3   MCHC      31.5 - 36.5 g/dL 33.5   RDW      10.0 - 15.0 % 13.2   Platelet Count      150 - 450 10e9/L 430   Diff Method       Automated Method   % Neutrophils      % 37.1   % Lymphocytes      % 40.5   % Monocytes      % 10.5   % Eosinophils      % 9.8   % Basophils      % 2.1   % Immature Granulocytes      % 0.0   Nucleated RBCs      0 /100 0   Absolute Neutrophil      1.6 - 8.3 10e9/L 2.2   Absolute Lymphocytes      0.8 - 5.3 10e9/L 2.4   Absolute Monocytes      0.0 - 1.3 10e9/L 0.6   Absolute Eosinophils      0.0 - 0.7 10e9/L 0.6   Absolute Basophils      0.0 - 0.2 10e9/L 0.1   Abs Immature Granulocytes      0 - 0.4 10e9/L 0.0   Absolute Nucleated RBC       0.0   Sodium      133 - 144 mmol/L 138   Potassium      3.4 - 5.3 mmol/L 4.3   Chloride      94 - 109 mmol/L 102   Carbon Dioxide      20 - 32 mmol/L 30   Anion Gap      3 - 14 mmol/L 5   Glucose      70 - 99 mg/dL 119 (H)   Urea Nitrogen      7 - 30 mg/dL 12   Creatinine      0.52 - 1.04 mg/dL 0.83   GFR Estimate      >60 mL/min/1.7m2 72   GFR Estimate If Black      >60 mL/min/1.7m2 87   Calcium      8.5 - 10.1 mg/dL 9.2   Bilirubin Total      0.2 - 1.3 mg/dL 0.8   Albumin      3.4 - 5.0 g/dL 3.9   Protein Total      6.8 - 8.8 g/dL 7.8   Alkaline Phosphatase      40 - 150 U/L 145   ALT      0 - 50 U/L 59 (H)   AST      0 - 45 U/L 39   Color Urine          Appearance Urine          Glucose Urine      NEG:Negative mg/dL    Bilirubin Urine      NEG:Negative    Ketones Urine      NEG:Negative mg/dL    Specific Gravity Urine      1.003 - 1.035    Blood Urine      NEG:Negative    pH Urine      5.0 - 7.0 pH    Protein Albumin Urine      NEG:Negative mg/dL    Urobilinogen mg/dL      0.0 - 2.0 mg/dL    Nitrite Urine      NEG:Negative    Leukocyte Esterase Urine      NEG:Negative    Source          WBC Urine      0 - 5  /HPF    RBC Urine      0 - 2 /HPF    Bacteria Urine      NEG:Negative /HPF    Mucous Urine      NEG:Negative /LPF    Bilirubin Direct      0.0 - 0.2 mg/dL 0.2   IGG      695 - 1620 mg/dL 1160   IgG1      300 - 856 mg/dL 416   IgG2      158 - 761 mg/dL 384   IgG3      24 - 192 mg/dL 83   IgG4      11 - 86 mg/dL 95 (H)     Component      Latest Ref Rng & Units 6/25/2019 9/13/2019   WBC      4.0 - 11.0 10e9/L  5.2   RBC Count      3.8 - 5.2 10e12/L  4.23   Hemoglobin      11.7 - 15.7 g/dL  13.7   Hematocrit      35.0 - 47.0 %  40.9   MCV      78 - 100 fl  97   MCH      26.5 - 33.0 pg  32.4   MCHC      31.5 - 36.5 g/dL  33.5   RDW      10.0 - 15.0 %  13.5   Platelet Count      150 - 450 10e9/L  336   Diff Method        Automated Method   % Neutrophils      %  39.3   % Lymphocytes      %  40.6   % Monocytes      %  12.8   % Eosinophils      %  5.2   % Basophils      %  2.1   % Immature Granulocytes      %  0.0   Nucleated RBCs      0 /100  0   Absolute Neutrophil      1.6 - 8.3 10e9/L  2.0   Absolute Lymphocytes      0.8 - 5.3 10e9/L  2.1   Absolute Monocytes      0.0 - 1.3 10e9/L  0.7   Absolute Eosinophils      0.0 - 0.7 10e9/L  0.3   Absolute Basophils      0.0 - 0.2 10e9/L  0.1   Abs Immature Granulocytes      0 - 0.4 10e9/L  0.0   Absolute Nucleated RBC        0.0   Sodium      133 - 144 mmol/L  138   Potassium      3.4 - 5.3 mmol/L  4.1   Chloride      94 - 109 mmol/L  104   Carbon Dioxide      20 - 32 mmol/L  29   Anion Gap      3 - 14 mmol/L  4   Glucose      70 - 99 mg/dL  93   Urea Nitrogen      7 - 30 mg/dL  14   Creatinine      0.52 - 1.04 mg/dL  0.76   GFR Estimate      >60 mL/min/1.73:m2  89   GFR Estimate If Black      >60 mL/min/1.73:m2  >90   Calcium      8.5 - 10.1 mg/dL  9.1   Bilirubin Total      0.2 - 1.3 mg/dL 0.7 0.8   Albumin      3.4 - 5.0 g/dL 4.0 4.2   Protein Total      6.8 - 8.8 g/dL 7.4 7.4   Alkaline Phosphatase      40 - 150 U/L 123 113   ALT      0 - 50 U/L 65 (H) 74 (H)   AST      0 - 45  U/L 56 (H) 45   Bilirubin Direct      0.0 - 0.2 mg/dL 0.2    IGG      695 - 1,620 mg/dL 963 976   IgG1      382 - 929 mg/dL 417 402   IgG2      242 - 700 mg/dL 364 337   IgG3      22 - 176 mg/dL 67 68   IgG4      4 - 86 mg/dL 89 (H) 87 (H)     Component      Latest Ref Rng & Units 9/17/2019   Bilirubin Direct      0.0 - 0.2 mg/dL 0.1   Bilirubin Total      0.2 - 1.3 mg/dL 0.5   Albumin      3.4 - 5.0 g/dL 4.0   Protein Total      6.8 - 8.8 g/dL 7.4   Alkaline Phosphatase      40 - 150 U/L 113   ALT      0 - 50 U/L 68 (H)   AST      0 - 45 U/L 32   IGG      695 - 1,620 mg/dL 953   IgG1      382 - 929 mg/dL 390   IgG2      242 - 700 mg/dL 349   IgG3      22 - 176 mg/dL 64   IgG4      4 - 86 mg/dL 86     Component      Latest Ref Rng & Units 8/5/2021   IGG      610-1,616 mg/dL 983   IgG1      382 - 929 mg/dL 437   IgG2      242 - 700 mg/dL 359   IgG3      22 - 176 mg/dL 62   IgG4      4 - 86 mg/dL 91 (H)   Subclasses, Percent      % 97     Component      Latest Ref Rng & Units 9/21/2021   Sodium      133 - 144 mmol/L 138   Potassium      3.4 - 5.3 mmol/L 3.9   Chloride      94 - 109 mmol/L 103   Carbon Dioxide      20 - 32 mmol/L 31   Anion Gap      3 - 14 mmol/L 4   Urea Nitrogen      7 - 30 mg/dL 10   Creatinine      0.52 - 1.04 mg/dL 0.78   Calcium      8.5 - 10.1 mg/dL 9.0   Glucose      70 - 99 mg/dL 91   Alkaline Phosphatase      40 - 150 U/L 107   AST      0 - 45 U/L 38   ALT      0 - 50 U/L 66 (H)   Protein Total      6.8 - 8.8 g/dL 6.5 (L)   Albumin      3.4 - 5.0 g/dL 3.1 (L)   Bilirubin Total      0.2 - 1.3 mg/dL 0.5   GFR Estimate      >60 mL/min/1.73m2 86     Component      Latest Ref Rng & Units 9/24/2021   Sodium      133 - 144 mmol/L 137   Potassium      3.4 - 5.3 mmol/L 4.1   Chloride      94 - 109 mmol/L 101   Carbon Dioxide      20 - 32 mmol/L 31   Anion Gap      3 - 14 mmol/L 5   Urea Nitrogen      7 - 30 mg/dL 11   Creatinine      0.52 - 1.04 mg/dL 0.58   Calcium      8.5 - 10.1 mg/dL 9.2    Glucose      70 - 99 mg/dL 116 (H)   GFR Estimate      >60 mL/min/1.73m2 >90   WBC      4.0 - 11.0 10e3/uL 6.7   RBC Count      3.80 - 5.20 10e6/uL 4.57   Hemoglobin      11.7 - 15.7 g/dL 14.3   Hematocrit      35.0 - 47.0 % 43.1   MCV      78 - 100 fL 94   MCH      26.5 - 33.0 pg 31.3   MCHC      31.5 - 36.5 g/dL 33.2   RDW      10.0 - 15.0 % 13.3   Platelet Count      150 - 450 10e3/uL 496 (H)

## 2021-10-07 ENCOUNTER — VIRTUAL VISIT (OUTPATIENT)
Dept: PSYCHOLOGY | Facility: CLINIC | Age: 56
End: 2021-10-07
Payer: COMMERCIAL

## 2021-10-07 ENCOUNTER — OFFICE VISIT (OUTPATIENT)
Dept: TRANSPLANT | Facility: CLINIC | Age: 56
End: 2021-10-07
Attending: TRANSPLANT SURGERY
Payer: COMMERCIAL

## 2021-10-07 ENCOUNTER — PATIENT OUTREACH (OUTPATIENT)
Dept: GASTROENTEROLOGY | Facility: CLINIC | Age: 56
End: 2021-10-07

## 2021-10-07 VITALS
WEIGHT: 133.6 LBS | HEART RATE: 97 BPM | BODY MASS INDEX: 20.92 KG/M2 | DIASTOLIC BLOOD PRESSURE: 69 MMHG | OXYGEN SATURATION: 99 % | SYSTOLIC BLOOD PRESSURE: 109 MMHG

## 2021-10-07 DIAGNOSIS — E89.1 POST-PANCREATECTOMY DIABETES (H): Primary | ICD-10-CM

## 2021-10-07 DIAGNOSIS — F43.23 ADJUSTMENT DISORDER WITH MIXED ANXIETY AND DEPRESSED MOOD: Primary | ICD-10-CM

## 2021-10-07 DIAGNOSIS — Z90.410 POST-PANCREATECTOMY DIABETES (H): Primary | ICD-10-CM

## 2021-10-07 DIAGNOSIS — E13.9 POST-PANCREATECTOMY DIABETES (H): Primary | ICD-10-CM

## 2021-10-07 PROCEDURE — 99215 OFFICE O/P EST HI 40 MIN: CPT | Performed by: PEDIATRICS

## 2021-10-07 PROCEDURE — 90837 PSYTX W PT 60 MINUTES: CPT | Mod: 95 | Performed by: STUDENT IN AN ORGANIZED HEALTH CARE EDUCATION/TRAINING PROGRAM

## 2021-10-07 RX ORDER — GLUCAGON INJECTION, SOLUTION 1 MG/.2ML
1 INJECTION, SOLUTION SUBCUTANEOUS
Qty: 0.2 ML | Refills: 1 | Status: SHIPPED | OUTPATIENT
Start: 2021-10-07 | End: 2022-09-07

## 2021-10-07 NOTE — Clinical Note
Michael,  She is only occasionally venting tube but she will do that when she is nauseated.  Minimal to no pain, nausea much better, gaining weight, back to work 3 hours a day!  Melena

## 2021-10-07 NOTE — NURSING NOTE
Chief Complaint   Patient presents with     RECHECK     Follow up auto islet     Blood pressure 109/69, pulse 97, weight 60.6 kg (133 lb 9.6 oz), SpO2 99 %, not currently breastfeeding.    Mira Bahena on 10/7/2021 at 12:49 PM

## 2021-10-07 NOTE — TELEPHONE ENCOUNTER
"Called to check in wit patient, is venting gtube as needed. Has gained 3 lbs. Ttags now off. Says tube still \"floats in and out from 8-10\", flange not always close to the skin, she can move it as she needs. No concerns at this time. Video visit follow up scheduled on 10-25 with Xavier.    ML  "

## 2021-10-07 NOTE — PROGRESS NOTES
"  Health Psychology                                                                                                                          Sweta Michelle, Ph.D., L.P [phone]  Melva Beebe, Ph.D., L.P. [phone]  Rachel Sloan, Ph.D. [phone]  Kenya Sanchez, Ph.D., L.P. [phone]  Estrada Patel, Ph.D., A.B.P.P., L.P. [phone]         Enedina Joaquin, Ph.D., L.P. [phone]                [facility], 3rd Floor  [address] [place] [address]     Health Psychology Follow-up Visit - Telehealth Visit     Dinora Mcghee is a 56 year old female who is being evaluated via a billable video visit.       The patient has been notified of following:      \"This video visit will be conducted via a video visit between you and your physician/provider. We have found that certain health care needs can be provided without the need for an in-person physical exam.  This service lets us provide the care you need with a video conversation.  If a prescription is necessary we can send it directly to your pharmacy.  If lab work is needed we can place an order for that and you can then stop by our lab to have the test done at a later time.     Video visits are billed at different rates depending on your insurance coverage.  Please reach out to your insurance provider with any questions.     If during the course of the call the physician/provider feels a video visit is not appropriate, you will not be charged for this service.\"     Patient has given verbal consent for Video visit? Yes     Will anyone else be joining your video visit? No    Date of Service:  10/7/21    SUBJECTIVE:  Dinora Mcghee was seen for individual psychotherapy. Reviewed emotional and physical health since our last session.    Symptoms reported: Pain very low for past four or five days. Nausea ongoing, managed with medication regimen. Low energy. Feeling better due to increased nutrition " though, weight increased two pounds.  Denied depressive symptoms and anxiety symptoms.    Progress towards goals: Asking for help more often, accepting support. Designating time in schedule for rest. Sharing experiences on her own terms with goal of helping others.     Interventions utilized: Reviewed effectiveness of coping skills such as blocking off time in calendar fo rest. Reviewed lessons she is learning from her experience. Reviewed experiences receiving social support.  Encouraged ongoing use of adaptive coping skills and provided positive feedback for efforts.  Engaged in reflection of past 11 months.      Ms. Mcghee was engaged throughout the visit and expressed understanding of information provided.     OBJECTIVE:  Ms. Mcghee was available for scheduled visit.  She reported sad mood, anxious.  Affect was mood- and thought-congruent.  Tearful at times.  Insight and judgment good.  Thought processes logical and linear. Speech WNL.  Denied suicidal ideation.     ASSESSMENT:  Psychological Assessment:  The PHQ-9 is an instrument for screening, diagnosing, monitoring and measuring the severity of depression. Scores of 5, 10, 15, and 20 represent cutpoints for mild, moderate, moderately severe and severe depression, respectively.   PHQ-9 SCORE 7/1/2019 6/21/2021 7/8/2021   PHQ-9 Total Score MyChart 3 (Minimal depression) 13 (Moderate depression) 13 (Moderate depression)   PHQ-9 Total Score 3 13 13       The DARCY-7 is an instrument for screening, diagnosing, monitoring and measuring the severity of anxiety. Scores of 5, 10, and 15 represent cutpoints for mild, moderate, and severe anxiety, respectively.  DARCY-7 SCORE 1/11/2018 1/22/2018 6/21/2021   Total Score 2 (minimal anxiety) - 0 (minimal anxiety)   Total Score 2 2 0     Dinora reports improvement in stress and depression as she has moved through process of initiating nutritional support. Plan is to meet at least one more time to assess maintenance of symptom  improvement and discuss plans for mental health support moving forward.     DIAGNOSIS:  Adjustment disorder with mixed anxiety and depressed mood.     PLAN:  Follow-up appointment scheduled for 11/11/21 at 9:00.     Type of service: Telemedicine Psychotherapy for coping with health conditions and symptoms of depression and anxiety  Time of service:   ? Date: 10/7/21  ? Time Service Began: 9:00  ? Time Service Ended: 10:00  Reason that telemedicine is appropriate: Public health regulations due to COVID-19 pandemic/state of emergency  Mode of transmission: Secure real time interactive audio and visual telecommunication system via [email or web address]  Location of originating and distant sites:  ? Originating site (patient location): Patient's home  ? Distant site (provider site): Provider's home    Rachel Sloan, PhD,   Health Psychology Fellow    Tx plan completed: 7/8/21  Tx plan due:  7/8/22      I did not see this patient directly. I have read and agree with the contents of this note. Kenya Sanchez, PhD, LP, October 7, 2021

## 2021-10-07 NOTE — LETTER
10/7/2021         RE: Dinora Mcghee  [address] [facility] [address]        Dear Colleague,    Thank you for referring your patient, Dinora Mcghee, to the [facility]. Please see a copy of my visit note below.    [facility]  Islet Autotransplant, Diabetes Follow Up    Problem List:  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  At the time of the procedure, the patient received:  Total Islet number: [id number].  Total Islet number/k.  Islet equivalents: [id number]  Islet equivalents/kilogram: 4091    Post-surgical course was complicated by two minor procedures for a retained foreign body near GJ site in 2017 and hernia repair 2018, and more recently by gastroparesis.  She has been insulin independent since about 1  year after surgery.  Gastroparesis is her major issue post TPIAT.  Also has hypoglycemia (prior treatments SGLT2 inhibitor not helpful, acarbose is helpful).     At today's visit,   Dinora has two admissions in Sept for placement of GJ feeding tube and management of complications of feeding tube including nausea, pain, tube displacement. She was in 9/7- 9/12;  9/18- 9/24.  She is finally feeling overall well.  She is no longer having pain. She takes flexaril if she gets a spasm that affects the GJ site and that works. She has nausea that is controlled with meds.  She has gained 2 pounds and is finally feeling more like her usual self.    The challenge has been hypoglycemia.  In fact in reviewing her Yandy, her BGs were basically perfect for the first 4 days after discharge on 9.24 and then it became more variable and particularly with persistent lows on her yandy.  She even went to the ED once per nurse line recs, and did have some hyperglycemia that night due to repeated treatment for the lows.  The change over that time was that she went from 16h to 12 h GJ feeds at a higher rate and changed feeds from night to daytime.   She treats lows usually with the glucose tabs or other oral, tried honey once but did not try gel.  Does not have glucagon emergency kit.     Diabetes history:  Current insulin regimen:  none    Wearing Yandy  Dates 9/24-10/7  Avg glucose 115mg/dL (stable)  TIR  is 99%, >180 is 0% , and <70 is 1%    Recent hemoglobin A1c levels:  Lab Results   Component Value Date    A1C 5.5 09/18/2021    A1C 5.4 08/05/2021    A1C 5.7 05/12/2021    A1C 5.9 04/01/2021    A1C 5.5 12/17/2020    A1C 5.8 07/30/2020       Hypoglycemia history:  Persistent lows when occurring over past few days.  The patient has had 0 episodes of severe hypoglycemia (seizure, coma, or neuroglycopenic symptoms severe enough to require assistance from another person).  Blood sugars were reviewed from the patient records and/or the  meter download.          Review of systems:  A complete ROS is negative except as noted in HPI above.  With her gastroparesis, she does have vomiting and bloating.  She is stopping amitiza and mag citrate and starting a new motility med.    Past Medical and Surgical History:  Past Medical History:   Diagnosis Date     Allergic rhinitis, cause unspecified      Allergy to other foods     strawberries, apples, celeries, alice, watermelon     Arthritis     left knee     Choledocholithiasis     long after cholecystectomy     Chronic abdominal pain      Chronic constipation      Chronic nausea      Chronic pancreatitis (H)      Degeneration of lumbar or lumbosacral intervertebral disc      Esophageal reflux     w/ hiatal hernia     Gastroparesis      Hiatal hernia      History of pituitary adenoma     s/p resection     Hypothyroidism      Migraines      Mild hyperlipidemia      On tube feeding diet     presence of GJ tube     Pancreatic disease     PD stricture, 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 endometriosis growth     APPENDECTOMY       C  DELIVERY ONLY       C  DELIVERY ONLY      repeat c section with incidental cystotomy with repair     C EXCIS PITUITARY,TRANSNASAL/SEPTAL      pituitary tumor removed for diabetes insipidus     C TOTAL ABDOM HYSTERECTOMY      w/ bilateral salpingoophorectomy       SECTION       COLONOSCOPY       ENDOSCOPIC RETROGRADE CHOLANGIOPANCREATOGRAM N/A 2015    Procedure: ENDOSCOPIC RETROGRADE CHOLANGIOPANCREATOGRAM;  Surgeon: Mandeep Park MD;  [facility]: [facility]     ENDOSCOPIC RETROGRADE CHOLANGIOPANCREATOGRAM N/A 2016    Procedure: COMBINED ENDOSCOPIC RETROGRADE  CHOLANGIOPANCREATOGRAPHY, PLACE TUBE/STENT;  Surgeon: Mandeep Park MD;  [facility]     ENDOSCOPIC RETROGRADE CHOLANGIOPANCREATOGRAM N/A 3/17/2016    Procedure: COMBINED ENDOSCOPIC RETROGRADE CHOLANGIOPANCREATOGRAPHY, REMOVE FOREIGN BODY OR STENT/TUBE;  Surgeon: Mandeep Park MD;  [facility]     ENDOSCOPIC RETROGRADE CHOLANGIOPANCREATOGRAM N/A 8/2/2016    Procedure: COMBINED ENDOSCOPIC RETROGRADE CHOLANGIOPANCREATOGRAPHY, PLACE TUBE/STENT;  Surgeon: Mandeep Park MD;  [facility]     ENDOSCOPIC RETROGRADE CHOLANGIOPANCREATOGRAM N/A 8/26/2016    Procedure: COMBINED ENDOSCOPIC RETROGRADE CHOLANGIOPANCREATOGRAPHY, REMOVE FOREIGN BODY OR STENT/TUBE;  Surgeon: Mandeep Park MD;  [facility]     ENDOSCOPIC ULTRASOUND UPPER GASTROINTESTINAL TRACT (GI) N/A 10/3/2016    Procedure: ENDOSCOPIC ULTRASOUND, ESOPHAGOSCOPY / UPPER GASTROINTESTINAL TRACT (GI);  Surgeon: Guru Jose Rodas MD;  [facility]: [facility]     ESOPHAGOSCOPY, GASTROSCOPY, DUODENOSCOPY (EGD), COMBINED N/A 6/24/2015    Procedure: COMBINED ESOPHAGOSCOPY, GASTROSCOPY, DUODENOSCOPY (EGD), REMOVE FOREIGN BODY;  Surgeon: Mandeep Park MD;  [facility]     ESOPHAGOSCOPY, GASTROSCOPY, DUODENOSCOPY (EGD), COMBINED N/A 10/25/2015    Procedure: COMBINED ESOPHAGOSCOPY, GASTROSCOPY, DUODENOSCOPY (EGD);  Surgeon: Sammy Amaro MD;  [facility]     ESOPHAGOSCOPY, GASTROSCOPY, DUODENOSCOPY (EGD), COMBINED N/A 10/25/2015    Procedure: COMBINED ESOPHAGOSCOPY, GASTROSCOPY, DUODENOSCOPY (EGD), BIOPSY SINGLE OR MULTIPLE;  Surgeon: Sammy Amaro MD;  [facility]     ESOPHAGOSCOPY, GASTROSCOPY, DUODENOSCOPY (EGD), DILATATION, COMBINED       EXCISE LESION TRUNK N/A 4/17/2017    Procedure: EXCISE LESION TRUNK;  Removal of Abdominal Foreign Body;  Surgeon: Nestor Phoenix MD;  [facility]     HC ESOPH/GAS REFLUX TEST W NASAL IMPED >1 HR N/A 11/19/2015    Procedure: ESOPHAGEAL  IMPEDENCE FUNCTION TEST WITH 24 HOUR PH GREATER THAN 1 HOUR;  Surgeon: Thiago Apple MD;  Location: [facility]     HC UGI ENDOSCOPY DIAG W BIOPSY  9/17/08     HC UGI ENDOSCOPY DIAG W BIOPSY  9/27/12     HC UGI ENDOSCOPY W ESOPHAGEAL DILATION BALLOON <30MM  9/17/08     HC UGI ENDOSCOPY W EUS N/A 5/5/2015    Procedure: COMBINED ENDOSCOPIC ULTRASOUND, ESOPHAGOSCOPY, GASTROSCOPY, DUODENOSCOPY (EGD);  Surgeon: Wm Dueñas MD;  Location: [facility]     HC WRIST ARTHROSCOP,RELEASE XVERS LIG Bilateral 12/17/08     INJECT TRANSVERSUS ABDOMINIS PLANE (TAP) BLOCK BILATERAL Left 9/22/2016    Procedure: INJECT TRANSVERSUS ABDOMINIS PLANE (TAP) BLOCK BILATERAL;  Surgeon: Dickson Corrigan MD;  [facility]     laparoscopic pineda  1995     LAPAROSCOPIC HERNIORRHAPHY INCISIONAL N/A 8/23/2018    Procedure: LAPAROSCOPIC HERNIORRHAPHY INCISIONAL;  Laparoscopic Incisional Hernia Repair with Symbotex Mesh Implant;  Surgeon: Nestor Phoenix MD;  [facility]     LAPAROSCOPIC PANCREATECTOMY, TRANSPLANT AUTO ISLET CELL N/A 12/28/2016    Procedure: LAPAROSCOPIC PANCREATECTOMY, TRANSPLANT AUTO ISLET CELL;  Surgeon: Nestor Phoenix MD;  [facility]     REPLACE GASTROJEJUNOSTOMY TUBE, PERCUTANEOUS N/A 9/7/2021    Procedure: GASTROJEJUNOSTOMY TUBE PLACEMENT, PERCUTANEOUS, WITH GASTROPEXY;  Surgeon: Mandeep Park MD;  [facility]     REPLACE GASTROJEJUNOSTOMY TUBE, PERCUTANEOUS N/A 9/22/2021    Procedure: REPLACEMENT, GASTROJEJUNOSTOMY TUBE, PERCUTANEOUS;  Surgeon: Zackery Montoya MD;  [facility]     REPLACE JEJUNOSTOMY TUBE, PERCUTANEOUS N/A 9/10/2021    Procedure: UPPER ENDOSCOPY, REPLACEMENT OF PERCUTANEOUS GASTROJEJUNOSTOMY TUBE, T-TAG GASTROPEXY;  Surgeon: Zackery Montoya MD;  [facility]     transphenoidal pituitary resection  1990       Family History:  New changes since last visit:  none  Family History   Problem Relation Age of Onset     Eye Disorder Father         cataract, detached  retina     Myocardial Infarction Father 60     Lipids Father      Cerebrovascular Disease Father      Depression Father      Substance Abuse Father      Anesthesia Reaction Father         stroke right after surgery     Cataracts Father      Osteoarthritis Father      Ulcerative Colitis Father      Lipids Mother      Hypertension Mother      Thyroid Disease Mother      Depression Mother      Angina Mother      GERD Mother      Skin Cancer Mother      Eye Disorder Son         ptosis     Eye Disorder Paternal Grandmother         cataract     Eye Disorder Paternal Grandfather         cataract     Diabetes Paternal Grandfather      Eye Disorder Maternal Grandmother         cataract     Thyroid Disease Maternal Grandmother      Coronary Artery Disease Sister         angioplasty     GERD Sister      Substance Abuse Sister      Depression Sister      Depression Son      Anxiety Disorder Son      Thyroid Disease Sister      Diabetes Maternal Grandfather      Depression Nephew      Anxiety Disorder Nephew      Thyroid Disease Nephew      Diabetes Type 2  Cousin         paternal cousin     Heart Disease Paternal Aunt      Diabetes Paternal Aunt      Diabetes Paternal Uncle      Heart Disease Paternal Uncle        Social History:  Social History     Social History Narrative     with 3 children and a dog.  No smoking, etoh or drug use.  Worked as a  for [employer] in [employer] in the past.  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     Physical Exam:  Vitals: /69   Pulse 97   Wt 60.6 kg (133 lb 9.6 oz)   SpO2 99%   BMI 20.92 kg/m    BMI= Body mass index is 20.92 kg/m .  General:  Appearance is normal, no acute distress  HEENT:  NC/AT, sclera appear white  Neck:  No obvious thyromegaly  CV/Lungs:  Non distressed breathing  Skin:  No apparent rashes  Neuro:  Normal mental  status  Psych:  Normal affect          Assessment:  1.  Post pancreatectomy diabetes mellitus, s/p total pancreatectomy and islet autotransplant.  (ICD-10 E89.1)  2.  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   Gastroparesis  4.  Nausea, vomiting  5.  Abdominal pain, chronic.     Dinora is a 56 year old with history of chronic pancreatitis who is s/p total pancreatectomy and islet autotransplant.  She has been insulin independent with A1c in goal range.  However, she had hyperglycemia and reactive hypoglycemia associated with gastroparesis diet.  She is now on continuous feeds which seemed to work well when she was on 16 hours but has resulted in more hypoglycemia and one ED visit after going to 12 hours.  We discussed plan below for hypoglycemia.     Plan:  1.  Changes to current diabetes regimen:  Patient Instructions   1)  We will try extending the feeds out for 16 hours (same daily volume) and you can ramp down by cutting rate in half for the last hour.      2)  Sent Rx for GVoke in case of emergency (glucagon)    3)  Try gel or honey for the lows to see if you will correct more quickly.     Keep the 2/3 appointment.     Let me know if you are still having problems with the lows on this new regimen.      2.  Frequency of blood sugar checks:  Keep wearing Yandy-- this is much better for Dinora than fingersticks because with fingersticks we miss the really critical data of the post prandial excursions.    3.  Continue routine follow up for autoislet transplant patients:  Mixed meal test (6 mL/kg BoostHP to max of 360 mL) at 3 months, 6 months, and once a year post transplant.  Hemoglobin A1c levels at these time points and quarterly.    4.  Other issues addressed today:  none    Follow up:  4 mos    Contact me for questions at [phone] or [phone].  Emergency number to reach  pediatric endocrinology after hours is [phone].    Dr. Gloria Melissa MD  [facility]  Director of Research, Islet Auto-transplant Program  Phone:  [phone]  Electronically signed: October 8, 2021 at 4:00 PM            Review of the result(s) of each unique test - HbA1c, jim  40 minutes spent on the date of the encounter doing chart review, history and exam, documentation, downloading and reviewing CGM data,  and further activities per the note      Again, thank you for allowing me to participate in the care of your patient.        Sincerely,        Gloria Melissa MD

## 2021-10-07 NOTE — PATIENT INSTRUCTIONS
1)  We will try extending the feeds out for 16 hours (same daily volume) and you can ramp down by cutting rate in half for the last hour.      2)  Sent Rx for GVoke in case of emergency (glucagon)    3)  Try gel or honey for the lows to see if you will correct more quickly.     Keep the 2/3 appointment.     Let me know if you are still having problems with the lows on this new regimen.

## 2021-10-08 NOTE — PROGRESS NOTES
[facility]  Islet Autotransplant, Diabetes Follow Up    Problem List:  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  At the time of the procedure, the patient received:  Total Islet number: [id number].  Total Islet number/k.  Islet equivalents: [id number]  Islet equivalents/kilogram: 4091    Post-surgical course was complicated by two minor procedures for a retained foreign body near GJ site in 2017 and hernia repair 2018, and more recently by gastroparesis.  She has been insulin independent since about 1 year after surgery.  Gastroparesis is her major issue post TPIAT.  Also has hypoglycemia (prior treatments SGLT2 inhibitor not helpful, acarbose is helpful).     At today's visit,   Dinora has two admissions in Sept for placement of GJ feeding tube  and management of complications of feeding tube including nausea, pain, tube displacement. She was in 9/7- 9/12;  9/18- 9/24.  She is finally feeling overall well.  She is no longer having pain. She takes flexaril if she gets a spasm that affects the GJ site and that works. She has nausea that is controlled with meds.  She has gained 2 pounds and is finally feeling more like her usual self.    The challenge has been hypoglycemia.  In fact in reviewing her Yandy, her BGs were basically perfect for the first 4 days after discharge on 9.24 and then it became more variable and particularly with persistent lows on her yandy.  She even went to the ED once per nurse line recs, and did have some hyperglycemia that night due to repeated treatment for the lows.  The change over that time was that she went from 16h to 12 h GJ feeds at a higher rate and changed feeds from night to daytime.   She treats lows usually with the glucose tabs or other oral, tried honey once but did not try gel.  Does not have glucagon emergency kit.     Diabetes history:  Current insulin regimen:  none    Wearing Yandy  Dates 9/24-10/7  Avg glucose 115mg/dL (stable)  TIR  is 99%, >180 is 0% , and <70 is 1%    Recent hemoglobin A1c levels:  Lab Results   Component Value Date    A1C 5.5 09/18/2021    A1C 5.4 08/05/2021    A1C 5.7 05/12/2021    A1C 5.9 04/01/2021    A1C 5.5 12/17/2020    A1C 5.8 07/30/2020       Hypoglycemia history:  Persistent lows when occurring over past few days.  The patient has had 0 episodes of severe hypoglycemia (seizure, coma, or neuroglycopenic symptoms severe enough to require assistance from another person).  Blood sugars were reviewed from the patient records and/or the meter download.          Review of systems:  A complete ROS is negative except as noted in HPI above.  With her gastroparesis, she does have vomiting and bloating.  She is stopping amitiza and mag citrate and starting a new motility med.    Past  Medical and Surgical History:  Past Medical History:   Diagnosis Date     Allergic rhinitis, cause unspecified      Allergy to other foods     strawberries, apples, celeries, alice, watermelon     Arthritis     left knee     Choledocholithiasis     long after cholecystectomy     Chronic abdominal pain      Chronic constipation      Chronic nausea      Chronic pancreatitis (H)      Degeneration of lumbar or lumbosacral intervertebral disc      Esophageal reflux     w/ hiatal hernia     Gastroparesis      Hiatal hernia      History of pituitary adenoma     s/p resection     Hypothyroidism      Migraines      Mild hyperlipidemia      On tube feeding diet     presence of GJ tube     Pancreatic disease     PD stricture, 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 endometriosis growth     APPENDECTOMY       C  DELIVERY ONLY       C  DELIVERY ONLY      repeat c section with incidental cystotomy with repair     C EXCIS PITUITARY,TRANSNASAL/SEPTAL      pituitary tumor removed for diabetes insipidus     C TOTAL ABDOM HYSTERECTOMY      w/ bilateral salpingoophorectomy       SECTION       COLONOSCOPY       ENDOSCOPIC RETROGRADE CHOLANGIOPANCREATOGRAM N/A 2015    Procedure: ENDOSCOPIC RETROGRADE CHOLANGIOPANCREATOGRAM;  Surgeon: Mandeep Park MD;  [facility]     ENDOSCOPIC RETROGRADE CHOLANGIOPANCREATOGRAM N/A 2016    Procedure: COMBINED ENDOSCOPIC RETROGRADE CHOLANGIOPANCREATOGRAPHY, PLACE TUBE/STENT;  Surgeon: Mandeep Park MD;  [facility]     ENDOSCOPIC RETROGRADE CHOLANGIOPANCREATOGRAM N/A 3/17/2016    Procedure: COMBINED ENDOSCOPIC RETROGRADE CHOLANGIOPANCREATOGRAPHY, REMOVE FOREIGN BODY OR STENT/TUBE;   Surgeon: Mandeep Park MD;  [facility]: [facility]     ENDOSCOPIC RETROGRADE CHOLANGIOPANCREATOGRAM N/A 8/2/2016    Procedure: COMBINED ENDOSCOPIC RETROGRADE CHOLANGIOPANCREATOGRAPHY, PLACE TUBE/STENT;  Surgeon: Mandeep Park MD;  [facility]: [facility]     ENDOSCOPIC RETROGRADE CHOLANGIOPANCREATOGRAM N/A 8/26/2016    Procedure: COMBINED ENDOSCOPIC RETROGRADE CHOLANGIOPANCREATOGRAPHY, REMOVE FOREIGN BODY OR STENT/TUBE;  Surgeon: Mandeep Park MD;  [facility]     ENDOSCOPIC ULTRASOUND UPPER GASTROINTESTINAL TRACT (GI) N/A 10/3/2016    Procedure: ENDOSCOPIC ULTRASOUND, ESOPHAGOSCOPY / UPPER GASTROINTESTINAL TRACT (GI);  Surgeon: Guru Jose Rodas MD;  [facility]: [facility]     ESOPHAGOSCOPY, GASTROSCOPY, DUODENOSCOPY (EGD), COMBINED N/A 6/24/2015    Procedure: COMBINED ESOPHAGOSCOPY, GASTROSCOPY, DUODENOSCOPY (EGD), REMOVE FOREIGN BODY;  Surgeon: Mandeep Park MD;  [facility]     ESOPHAGOSCOPY, GASTROSCOPY, DUODENOSCOPY (EGD), COMBINED N/A 10/25/2015    Procedure: COMBINED ESOPHAGOSCOPY, GASTROSCOPY, DUODENOSCOPY (EGD);  Surgeon: Sammy Amaro MD;  [facility]     ESOPHAGOSCOPY, GASTROSCOPY, DUODENOSCOPY (EGD), COMBINED N/A 10/25/2015    Procedure: COMBINED ESOPHAGOSCOPY, GASTROSCOPY, DUODENOSCOPY (EGD), BIOPSY SINGLE OR MULTIPLE;  Surgeon: Sammy Amaro MD;  [facility]     ESOPHAGOSCOPY, GASTROSCOPY, DUODENOSCOPY (EGD), DILATATION, COMBINED       EXCISE LESION TRUNK N/A 4/17/2017    Procedure: EXCISE LESION TRUNK;  Removal of Abdominal Foreign Body;  Surgeon: Nestor Phoenix MD;  [facility]     HC ESOPH/GAS REFLUX TEST W NASAL IMPED >1 HR N/A 11/19/2015    Procedure: ESOPHAGEAL IMPEDENCE FUNCTION TEST WITH 24 HOUR PH GREATER THAN 1 HOUR;  Surgeon: Thiago Apple MD;  Location[facility]     HC UGI ENDOSCOPY DIAG W BIOPSY  9/17/08     HC UGI ENDOSCOPY DIAG W BIOPSY  9/27/12     HC UGI ENDOSCOPY W ESOPHAGEAL DILATION BALLOON <30MM  9/17/08      HC UGI ENDOSCOPY W EUS N/A 5/5/2015    Procedure: COMBINED ENDOSCOPIC ULTRASOUND, ESOPHAGOSCOPY, GASTROSCOPY, DUODENOSCOPY (EGD);  Surgeon: Wm Dueñas MD;  Location: [facility]     HC WRIST ARTHROSCOP,RELEASE XVERS LIG Bilateral 12/17/08     INJECT TRANSVERSUS ABDOMINIS PLANE (TAP) BLOCK BILATERAL Left 9/22/2016    Procedure: INJECT TRANSVERSUS ABDOMINIS PLANE (TAP) BLOCK BILATERAL;  Surgeon: Dickson Corrigan MD;  [facility]     laparoscopic pineda  1995     LAPAROSCOPIC HERNIORRHAPHY INCISIONAL N/A 8/23/2018    Procedure: LAPAROSCOPIC HERNIORRHAPHY INCISIONAL;  Laparoscopic Incisional Hernia Repair with Symbotex Mesh Implant;  Surgeon: Nestor Phoenix MD;  [facility]     LAPAROSCOPIC PANCREATECTOMY, TRANSPLANT AUTO ISLET CELL N/A 12/28/2016    Procedure: LAPAROSCOPIC PANCREATECTOMY, TRANSPLANT AUTO ISLET CELL;  Surgeon: Nestor Phoenix MD;  [facility]     REPLACE GASTROJEJUNOSTOMY TUBE, PERCUTANEOUS N/A 9/7/2021    Procedure: GASTROJEJUNOSTOMY TUBE PLACEMENT, PERCUTANEOUS, WITH GASTROPEXY;  Surgeon: Mandeep Prak MD;  [facility]     REPLACE GASTROJEJUNOSTOMY TUBE, PERCUTANEOUS N/A 9/22/2021    Procedure: REPLACEMENT, GASTROJEJUNOSTOMY TUBE, PERCUTANEOUS;  Surgeon: Zackery Montoya MD;  [facility]     REPLACE JEJUNOSTOMY TUBE, PERCUTANEOUS N/A 9/10/2021    Procedure: UPPER ENDOSCOPY, REPLACEMENT OF PERCUTANEOUS GASTROJEJUNOSTOMY TUBE, T-TAG GASTROPEXY;  Surgeon: Zackery Montoya MD;  [facility]     transphenoidal pituitary resection  1990       Family History:  New changes since last visit:  none  Family History   Problem Relation Age of Onset     Eye Disorder Father         cataract, detached retina     Myocardial Infarction Father 60     Lipids Father      Cerebrovascular Disease Father      Depression Father      Substance Abuse Father      Anesthesia Reaction Father         stroke right after surgery     Cataracts Father      Osteoarthritis Father       Ulcerative Colitis Father      Lipids Mother      Hypertension Mother      Thyroid Disease Mother      Depression Mother      Angina Mother      GERD Mother      Skin Cancer Mother      Eye Disorder Son         ptosis     Eye Disorder Paternal Grandmother         cataract     Eye Disorder Paternal Grandfather         cataract     Diabetes Paternal Grandfather      Eye Disorder Maternal Grandmother         cataract     Thyroid Disease Maternal Grandmother      Coronary Artery Disease Sister         angioplasty     GERD Sister      Substance Abuse Sister      Depression Sister      Depression Son      Anxiety Disorder Son      Thyroid Disease Sister      Diabetes Maternal Grandfather      Depression Nephew      Anxiety Disorder Nephew      Thyroid Disease Nephew      Diabetes Type 2  Cousin         paternal cousin     Heart Disease Paternal Aunt      Diabetes Paternal Aunt      Diabetes Paternal Uncle      Heart Disease Paternal Uncle        Social History:  Social History     Social History Narrative     with 3 children and a dog.  No smoking, etoh or drug use.  Worked as a  for [employer] in [place] in the past.  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     Physical Exam:  Vitals: /69   Pulse 97   Wt 60.6 kg (133 lb 9.6 oz)   SpO2 99%   BMI 20.92 kg/m    BMI= Body mass index is 20.92 kg/m .  General:  Appearance is normal, no acute distress  HEENT:  NC/AT, sclera appear white  Neck:  No obvious thyromegaly  CV/Lungs:  Non distressed breathing  Skin:  No apparent rashes  Neuro:  Normal mental status  Psych:  Normal affect          Assessment:  1.  Post pancreatectomy diabetes mellitus, s/p total pancreatectomy and islet autotransplant.  (ICD-10 E89.1)  2.  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   Gastroparesis  4.  Nausea, vomiting  5.  Abdominal pain, chronic.     Dinora is a 56 year old with history of chronic pancreatitis who is s/p total pancreatectomy and islet autotransplant.  She has been insulin independent with A1c in goal range.  However, she had hyperglycemia and reactive hypoglycemia associated with gastroparesis diet.  She is now on continuous feeds which seemed to work well when she was on 16 hours but has resulted in more hypoglycemia and one ED visit after going to 12 hours.  We discussed plan below for hypoglycemia.     Plan:  1.  Changes to current diabetes regimen:  Patient Instructions   1)  We will try extending the feeds out for 16 hours (same daily volume) and you can ramp down by cutting rate in half for the last hour.      2)  Sent Rx for GVoke in case of emergency (glucagon)    3)  Try gel or honey for the lows to see if you will correct more quickly.     Keep the 2/3 appointment.     Let me know if you are still having problems with the lows on this new regimen.      2.  Frequency of blood sugar checks:  Keep wearing Yandy-- this is much better for Dinora than fingersticks because with fingersticks we miss the really critical data of the post prandial excursions.    3.  Continue routine follow up for autoislet transplant patients:  Mixed meal test (6 mL/kg BoostHP to max of 360 mL) at 3 months, 6 months, and once a year post transplant.  Hemoglobin A1c levels at these time points and quarterly.    4.  Other issues addressed today:  none    Follow up:  4 mos    Contact me for questions at [phone] or [phone].  Emergency number to reach pediatric endocrinology after hours is [phone].    Dr. Gloria Melissa MD  [facility]  Director of Research, Islet Auto-transplant Program  Phone:  [phone]  Electronically signed: October 8, 2021 at 4:00  PM            Review of the result(s) of each unique test - HbA1c, jim  40 minutes spent on the date of the encounter doing chart review, history and exam, documentation, downloading and reviewing CGM data,  and further activities per the note

## 2021-10-08 NOTE — H&P (VIEW-ONLY)
[facility]  Islet Autotransplant, Diabetes Follow Up    Problem List:  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  At the time of the procedure, the patient received:  Total Islet number: [id number].  Total Islet number/k.  Islet equivalents: [id number]  Islet equivalents/kilogram: 4091    Post-surgical course was complicated by two minor procedures for a retained foreign body near GJ site in 2017 and hernia repair 2018, and more recently by gastroparesis.  She has been insulin independent since about 1 year after surgery.  Gastroparesis is her major issue post TPIAT.  Also has hypoglycemia (prior treatments SGLT2 inhibitor not helpful, acarbose is helpful).     At today's visit,   Dinora has two admissions in Sept for placement of GJ feeding tube  and management of complications of feeding tube including nausea, pain, tube displacement. She was in 9/7- 9/12;  9/18- 9/24.  She is finally feeling overall well.  She is no longer having pain. She takes flexaril if she gets a spasm that affects the GJ site and that works. She has nausea that is controlled with meds.  She has gained 2 pounds and is finally feeling more like her usual self.    The challenge has been hypoglycemia.  In fact in reviewing her Yandy, her BGs were basically perfect for the first 4 days after discharge on 9.24 and then it became more variable and particularly with persistent lows on her yandy.  She even went to the ED once per nurse line recs, and did have some hyperglycemia that night due to repeated treatment for the lows.  The change over that time was that she went from 16h to 12 h GJ feeds at a higher rate and changed feeds from night to daytime.   She treats lows usually with the glucose tabs or other oral, tried honey once but did not try gel.  Does not have glucagon emergency kit.     Diabetes history:  Current insulin regimen:  none    Wearing Yandy  Dates 9/24-10/7  Avg glucose 115mg/dL (stable)  TIR  is 99%, >180 is 0% , and <70 is 1%    Recent hemoglobin A1c levels:  Lab Results   Component Value Date    A1C 5.5 09/18/2021    A1C 5.4 08/05/2021    A1C 5.7 05/12/2021    A1C 5.9 04/01/2021    A1C 5.5 12/17/2020    A1C 5.8 07/30/2020       Hypoglycemia history:  Persistent lows when occurring over past few days.  The patient has had 0 episodes of severe hypoglycemia (seizure, coma, or neuroglycopenic symptoms severe enough to require assistance from another person).  Blood sugars were reviewed from the patient records and/or the meter download.          Review of systems:  A complete ROS is negative except as noted in HPI above.  With her gastroparesis, she does have vomiting and bloating.  She is stopping amitiza and mag citrate and starting a new motility med.    Past  Medical and Surgical History:  Past Medical History:   Diagnosis Date     Allergic rhinitis, cause unspecified      Allergy to other foods     strawberries, apples, celeries, alice, watermelon     Arthritis     left knee     Choledocholithiasis     long after cholecystectomy     Chronic abdominal pain      Chronic constipation      Chronic nausea      Chronic pancreatitis (H)      Degeneration of lumbar or lumbosacral intervertebral disc      Esophageal reflux     w/ hiatal hernia     Gastroparesis      Hiatal hernia      History of pituitary adenoma     s/p resection     Hypothyroidism      Migraines      Mild hyperlipidemia      On tube feeding diet     presence of GJ tube     Pancreatic disease     PD stricture, 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 endometriosis growth     APPENDECTOMY       C  DELIVERY ONLY       C  DELIVERY ONLY      repeat c section with incidental cystotomy with repair     C EXCIS PITUITARY,TRANSNASAL/SEPTAL      pituitary tumor removed for diabetes insipidus     C TOTAL ABDOM HYSTERECTOMY      w/ bilateral salpingoophorectomy       SECTION       COLONOSCOPY       ENDOSCOPIC RETROGRADE CHOLANGIOPANCREATOGRAM N/A 2015    Procedure: ENDOSCOPIC RETROGRADE CHOLANGIOPANCREATOGRAM;  Surgeon: Mandeep Park MD;  Location: [facility]     ENDOSCOPIC RETROGRADE CHOLANGIOPANCREATOGRAM N/A 2016    Procedure: COMBINED ENDOSCOPIC RETROGRADE CHOLANGIOPANCREATOGRAPHY, PLACE TUBE/STENT;  Surgeon: Mandeep Park MD;  Location: [facility]     ENDOSCOPIC RETROGRADE CHOLANGIOPANCREATOGRAM N/A 3/17/2016    Procedure: COMBINED ENDOSCOPIC RETROGRADE CHOLANGIOPANCREATOGRAPHY, REMOVE FOREIGN BODY OR STENT/TUBE;   Surgeon: Mandeep Park MD;  Location: [facility]     ENDOSCOPIC RETROGRADE CHOLANGIOPANCREATOGRAM N/A 8/2/2016    Procedure: COMBINED ENDOSCOPIC RETROGRADE CHOLANGIOPANCREATOGRAPHY, PLACE TUBE/STENT;  Surgeon: Mandeep Park MD;  Location: [facility]     ENDOSCOPIC RETROGRADE CHOLANGIOPANCREATOGRAM N/A 8/26/2016    Procedure: COMBINED ENDOSCOPIC RETROGRADE CHOLANGIOPANCREATOGRAPHY, REMOVE FOREIGN BODY OR STENT/TUBE;  Surgeon: Mandeep Park MD;  Location: [facility]     ENDOSCOPIC ULTRASOUND UPPER GASTROINTESTINAL TRACT (GI) N/A 10/3/2016    Procedure: ENDOSCOPIC ULTRASOUND, ESOPHAGOSCOPY / UPPER GASTROINTESTINAL TRACT (GI);  Surgeon: Guru Jose Rodas MD;  Location: [facility]     ESOPHAGOSCOPY, GASTROSCOPY, DUODENOSCOPY (EGD), COMBINED N/A 6/24/2015    Procedure: COMBINED ESOPHAGOSCOPY, GASTROSCOPY, DUODENOSCOPY (EGD), REMOVE FOREIGN BODY;  Surgeon: Mandeep Park MD;  Location: [facility]     ESOPHAGOSCOPY, GASTROSCOPY, DUODENOSCOPY (EGD), COMBINED N/A 10/25/2015    Procedure: COMBINED ESOPHAGOSCOPY, GASTROSCOPY, DUODENOSCOPY (EGD);  Surgeon: Sammy Amaro MD;  [facility]: [facility]     ESOPHAGOSCOPY, GASTROSCOPY, DUODENOSCOPY (EGD), COMBINED N/A 10/25/2015    Procedure: COMBINED ESOPHAGOSCOPY, GASTROSCOPY, DUODENOSCOPY (EGD), BIOPSY SINGLE OR MULTIPLE;  Surgeon: Sammy Amaro MD;  Location: [facility]     ESOPHAGOSCOPY, GASTROSCOPY, DUODENOSCOPY (EGD), DILATATION, COMBINED       EXCISE LESION TRUNK N/A 4/17/2017    Procedure: EXCISE LESION TRUNK;  Removal of Abdominal Foreign Body;  Surgeon: Nestor Phoenix MD;  [facility]     HC ESOPH/GAS REFLUX TEST W NASAL IMPED >1 HR N/A 11/19/2015    Procedure: ESOPHAGEAL IMPEDENCE FUNCTION TEST WITH 24 HOUR PH GREATER THAN 1 HOUR;  Surgeon: Thiago Apple MD;  Location: [facility]     HC UGI ENDOSCOPY DIAG W BIOPSY  9/17/08     HC UGI ENDOSCOPY DIAG W BIOPSY  9/27/12     HC UGI ENDOSCOPY W ESOPHAGEAL DILATION BALLOON <30MM  9/17/08      HC UGI ENDOSCOPY W EUS N/A 5/5/2015    Procedure: COMBINED ENDOSCOPIC ULTRASOUND, ESOPHAGOSCOPY, GASTROSCOPY, DUODENOSCOPY (EGD);  Surgeon: Wm Dueñas MD;  Location: [facility]     HC WRIST ARTHROSCOP,RELEASE XVERS LIG Bilateral 12/17/08     INJECT TRANSVERSUS ABDOMINIS PLANE (TAP) BLOCK BILATERAL Left 9/22/2016    Procedure: INJECT TRANSVERSUS ABDOMINIS PLANE (TAP) BLOCK BILATERAL;  Surgeon: Dickson Corrigan MD;  [facility]     laparoscopic pineda  1995     LAPAROSCOPIC HERNIORRHAPHY INCISIONAL N/A 8/23/2018    Procedure: LAPAROSCOPIC HERNIORRHAPHY INCISIONAL;  Laparoscopic Incisional Hernia Repair with Symbotex Mesh Implant;  Surgeon: Nestor Phoenix MD;  Location: [facility]     LAPAROSCOPIC PANCREATECTOMY, TRANSPLANT AUTO ISLET CELL N/A 12/28/2016    Procedure: LAPAROSCOPIC PANCREATECTOMY, TRANSPLANT AUTO ISLET CELL;  Surgeon: Nestor Phoenix MD;  [facility]: [facility]     REPLACE GASTROJEJUNOSTOMY TUBE, PERCUTANEOUS N/A 9/7/2021    Procedure: GASTROJEJUNOSTOMY TUBE PLACEMENT, PERCUTANEOUS, WITH GASTROPEXY;  Surgeon: Mandeep Park MD;  [facility]: [facility]     REPLACE GASTROJEJUNOSTOMY TUBE, PERCUTANEOUS N/A 9/22/2021    Procedure: REPLACEMENT, GASTROJEJUNOSTOMY TUBE, PERCUTANEOUS;  Surgeon: Zackery Montoya MD;  [facility]: [facility]     REPLACE JEJUNOSTOMY TUBE, PERCUTANEOUS N/A 9/10/2021    Procedure: UPPER ENDOSCOPY, REPLACEMENT OF PERCUTANEOUS GASTROJEJUNOSTOMY TUBE, T-TAG GASTROPEXY;  Surgeon: Zackery Montoya MD;  Location: [facility]     transphenoidal pituitary resection  1990       Family History:  New changes since last visit:  none  Family History   Problem Relation Age of Onset     Eye Disorder Father         cataract, detached retina     Myocardial Infarction Father 60     Lipids Father      Cerebrovascular Disease Father      Depression Father      Substance Abuse Father      Anesthesia Reaction Father         stroke right after surgery     Cataracts Father      Osteoarthritis Father       Ulcerative Colitis Father      Lipids Mother      Hypertension Mother      Thyroid Disease Mother      Depression Mother      Angina Mother      GERD Mother      Skin Cancer Mother      Eye Disorder Son         ptosis     Eye Disorder Paternal Grandmother         cataract     Eye Disorder Paternal Grandfather         cataract     Diabetes Paternal Grandfather      Eye Disorder Maternal Grandmother         cataract     Thyroid Disease Maternal Grandmother      Coronary Artery Disease Sister         angioplasty     GERD Sister      Substance Abuse Sister      Depression Sister      Depression Son      Anxiety Disorder Son      Thyroid Disease Sister      Diabetes Maternal Grandfather      Depression Nephew      Anxiety Disorder Nephew      Thyroid Disease Nephew      Diabetes Type 2  Cousin         paternal cousin     Heart Disease Paternal Aunt      Diabetes Paternal Aunt      Diabetes Paternal Uncle      Heart Disease Paternal Uncle        Social History:  Social History     Social History Narrative     with 3 children and a dog.  No smoking, etoh or drug use.  Worked as a  for [employer] in [place] in the past.  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     Physical Exam:  Vitals: /69   Pulse 97   Wt 60.6 kg (133 lb 9.6 oz)   SpO2 99%   BMI 20.92 kg/m    BMI= Body mass index is 20.92 kg/m .  General:  Appearance is normal, no acute distress  HEENT:  NC/AT, sclera appear white  Neck:  No obvious thyromegaly  CV/Lungs:  Non distressed breathing  Skin:  No apparent rashes  Neuro:  Normal mental status  Psych:  Normal affect          Assessment:  1.  Post pancreatectomy diabetes mellitus, s/p total pancreatectomy and islet autotransplant.  (ICD-10 E89.1)  2.  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   Gastroparesis  4.  Nausea, vomiting  5.  Abdominal pain, chronic.     Dinora is a 56 year old with history of chronic pancreatitis who is s/p total pancreatectomy and islet autotransplant.  She has been insulin independent with A1c in goal range.  However, she had hyperglycemia and reactive hypoglycemia associated with gastroparesis diet.  She is now on continuous feeds which seemed to work well when she was on 16 hours but has resulted in more hypoglycemia and one ED visit after going to 12 hours.  We discussed plan below for hypoglycemia.     Plan:  1.  Changes to current diabetes regimen:  Patient Instructions   1)  We will try extending the feeds out for 16 hours (same daily volume) and you can ramp down by cutting rate in half for the last hour.      2)  Sent Rx for GVoke in case of emergency (glucagon)    3)  Try gel or honey for the lows to see if you will correct more quickly.     Keep the 2/3 appointment.     Let me know if you are still having problems with the lows on this new regimen.      2.  Frequency of blood sugar checks:  Keep wearing Yandy-- this is much better for Dinora than fingersticks because with fingersticks we miss the really critical data of the post prandial excursions.    3.  Continue routine follow up for autoislet transplant patients:  Mixed meal test (6 mL/kg BoostHP to max of 360 mL) at 3 months, 6 months, and once a year post transplant.  Hemoglobin A1c levels at these time points and quarterly.    4.  Other issues addressed today:  none    Follow up:  4 mos    Contact me for questions at [phone] or [phone].  Emergency number to reach pediatric endocrinology after hours is [phone].    Dr. Gloria Melissa MD  [facility] [place]  Director of Research, Islet Auto-transplant Program  Phone:  [phone]  Electronically signed: October 8, 2021 at 4:00  PM            Review of the result(s) of each unique test - HbA1c, jim  40 minutes spent on the date of the encounter doing chart review, history and exam, documentation, downloading and reviewing CGM data,  and further activities per the note

## 2021-10-14 DIAGNOSIS — Z53.9 DIAGNOSIS NOT YET DEFINED: Primary | ICD-10-CM

## 2021-10-15 ENCOUNTER — HOME INFUSION (PRE-WILLOW HOME INFUSION) (OUTPATIENT)
Dept: PHARMACY | Facility: CLINIC | Age: 56
End: 2021-10-15

## 2021-10-18 ENCOUNTER — ANESTHESIA EVENT (OUTPATIENT)
Dept: SURGERY | Facility: CLINIC | Age: 56
End: 2021-10-18
Payer: COMMERCIAL

## 2021-10-18 ENCOUNTER — PATIENT OUTREACH (OUTPATIENT)
Dept: GASTROENTEROLOGY | Facility: CLINIC | Age: 56
End: 2021-10-18

## 2021-10-18 ENCOUNTER — LAB (OUTPATIENT)
Dept: URGENT CARE | Facility: URGENT CARE | Age: 56
End: 2021-10-18
Payer: COMMERCIAL

## 2021-10-18 DIAGNOSIS — Z20.822 COVID-19 RULED OUT: ICD-10-CM

## 2021-10-18 DIAGNOSIS — Z20.822 COVID-19 RULED OUT: Primary | ICD-10-CM

## 2021-10-18 DIAGNOSIS — Z46.59 ENCOUNTER FOR CARE RELATED TO FEEDING TUBE: Primary | ICD-10-CM

## 2021-10-18 LAB — SARS-COV-2 RNA RESP QL NAA+PROBE: NEGATIVE

## 2021-10-18 PROCEDURE — 99207 PR NO CHARGE LOS: CPT

## 2021-10-18 PROCEDURE — U0003 INFECTIOUS AGENT DETECTION BY NUCLEIC ACID (DNA OR RNA); SEVERE ACUTE RESPIRATORY SYNDROME CORONAVIRUS 2 (SARS-COV-2) (CORONAVIRUS DISEASE [COVID-19]), AMPLIFIED PROBE TECHNIQUE, MAKING USE OF HIGH THROUGHPUT TECHNOLOGIES AS DESCRIBED BY CMS-2020-01-R: HCPCS

## 2021-10-18 PROCEDURE — U0005 INFEC AGEN DETEC AMPLI PROBE: HCPCS

## 2021-10-18 ASSESSMENT — ENCOUNTER SYMPTOMS: SEIZURES: 0

## 2021-10-18 ASSESSMENT — LIFESTYLE VARIABLES: TOBACCO_USE: 1

## 2021-10-18 NOTE — PROGRESS NOTES
This is a recent snapshot of the patient's [facility] medical record.  For current drug dose and complete information and questions, call [phone] or In Basket pool, [facility] home infusion ([id number])  CSN Number:  [id number]

## 2021-10-18 NOTE — TELEPHONE ENCOUNTER
Pt Dr. Park pt's tube clogged.  Needs ASAP exchange with Dr. Park in [facility]    Orders placed, message sent to scheduling.    ML

## 2021-10-19 ENCOUNTER — ANESTHESIA (OUTPATIENT)
Dept: SURGERY | Facility: CLINIC | Age: 56
End: 2021-10-19
Payer: COMMERCIAL

## 2021-10-19 ENCOUNTER — HOSPITAL ENCOUNTER (OUTPATIENT)
Facility: CLINIC | Age: 56
Discharge: HOME | End: 2021-10-19
Attending: INTERNAL MEDICINE | Admitting: INTERNAL MEDICINE
Payer: COMMERCIAL

## 2021-10-19 ENCOUNTER — APPOINTMENT (OUTPATIENT)
Dept: GENERAL RADIOLOGY | Facility: CLINIC | Age: 56
End: 2021-10-19
Attending: INTERNAL MEDICINE
Payer: COMMERCIAL

## 2021-10-19 VITALS
BODY MASS INDEX: 20.66 KG/M2 | TEMPERATURE: 97.7 F | DIASTOLIC BLOOD PRESSURE: 78 MMHG | OXYGEN SATURATION: 98 % | RESPIRATION RATE: 14 BRPM | WEIGHT: 131.61 LBS | SYSTOLIC BLOOD PRESSURE: 109 MMHG | HEART RATE: 83 BPM | HEIGHT: 67 IN

## 2021-10-19 LAB — GLUCOSE BLDC GLUCOMTR-MCNC: 82 MG/DL (ref 70–99)

## 2021-10-19 PROCEDURE — 272N000001 HC OR GENERAL SUPPLY STERILE: Performed by: INTERNAL MEDICINE

## 2021-10-19 PROCEDURE — 999N000179 XR SURGERY CARM FLUORO LESS THAN 5 MIN W STILLS: Mod: TC

## 2021-10-19 PROCEDURE — 360N000075 HC SURGERY LEVEL 2, PER MIN: Performed by: INTERNAL MEDICINE

## 2021-10-19 PROCEDURE — 250N000009 HC RX 250: Performed by: INTERNAL MEDICINE

## 2021-10-19 PROCEDURE — 250N000011 HC RX IP 250 OP 636

## 2021-10-19 PROCEDURE — 370N000017 HC ANESTHESIA TECHNICAL FEE, PER MIN: Performed by: INTERNAL MEDICINE

## 2021-10-19 PROCEDURE — 250N000009 HC RX 250

## 2021-10-19 PROCEDURE — 255N000002 HC RX 255 OP 636: Performed by: INTERNAL MEDICINE

## 2021-10-19 PROCEDURE — 710N000012 HC RECOVERY PHASE 2, PER MINUTE: Performed by: INTERNAL MEDICINE

## 2021-10-19 PROCEDURE — 250N000011 HC RX IP 250 OP 636: Performed by: INTERNAL MEDICINE

## 2021-10-19 PROCEDURE — 999N000141 HC STATISTIC PRE-PROCEDURE NURSING ASSESSMENT: Performed by: INTERNAL MEDICINE

## 2021-10-19 PROCEDURE — 258N000003 HC RX IP 258 OP 636

## 2021-10-19 PROCEDURE — 82962 GLUCOSE BLOOD TEST: CPT

## 2021-10-19 PROCEDURE — C1769 GUIDE WIRE: HCPCS | Performed by: INTERNAL MEDICINE

## 2021-10-19 RX ORDER — FENTANYL CITRATE 50 UG/ML
25 INJECTION, SOLUTION INTRAMUSCULAR; INTRAVENOUS
Status: DISCONTINUED | OUTPATIENT
Start: 2021-10-19 | End: 2021-10-19 | Stop reason: HOSPADM

## 2021-10-19 RX ORDER — ONDANSETRON 2 MG/ML
4 INJECTION INTRAMUSCULAR; INTRAVENOUS EVERY 30 MIN PRN
Status: DISCONTINUED | OUTPATIENT
Start: 2021-10-19 | End: 2021-10-19 | Stop reason: HOSPADM

## 2021-10-19 RX ORDER — NALOXONE HYDROCHLORIDE 0.4 MG/ML
0.4 INJECTION, SOLUTION INTRAMUSCULAR; INTRAVENOUS; SUBCUTANEOUS
Status: DISCONTINUED | OUTPATIENT
Start: 2021-10-19 | End: 2021-10-19 | Stop reason: HOSPADM

## 2021-10-19 RX ORDER — FENTANYL CITRATE 50 UG/ML
25 INJECTION, SOLUTION INTRAMUSCULAR; INTRAVENOUS EVERY 5 MIN PRN
Status: DISCONTINUED | OUTPATIENT
Start: 2021-10-19 | End: 2021-10-19 | Stop reason: HOSPADM

## 2021-10-19 RX ORDER — LABETALOL HYDROCHLORIDE 5 MG/ML
10 INJECTION, SOLUTION INTRAVENOUS
Status: DISCONTINUED | OUTPATIENT
Start: 2021-10-19 | End: 2021-10-19 | Stop reason: HOSPADM

## 2021-10-19 RX ORDER — ONDANSETRON 4 MG/1
4 TABLET, ORALLY DISINTEGRATING ORAL EVERY 30 MIN PRN
Status: DISCONTINUED | OUTPATIENT
Start: 2021-10-19 | End: 2021-10-19 | Stop reason: HOSPADM

## 2021-10-19 RX ORDER — ONDANSETRON 2 MG/ML
4 INJECTION INTRAMUSCULAR; INTRAVENOUS EVERY 6 HOURS PRN
Status: DISCONTINUED | OUTPATIENT
Start: 2021-10-19 | End: 2021-10-19 | Stop reason: HOSPADM

## 2021-10-19 RX ORDER — SODIUM CHLORIDE, SODIUM LACTATE, POTASSIUM CHLORIDE, CALCIUM CHLORIDE 600; 310; 30; 20 MG/100ML; MG/100ML; MG/100ML; MG/100ML
INJECTION, SOLUTION INTRAVENOUS CONTINUOUS PRN
Status: DISCONTINUED | OUTPATIENT
Start: 2021-10-19 | End: 2021-10-19

## 2021-10-19 RX ORDER — NALOXONE HYDROCHLORIDE 0.4 MG/ML
0.2 INJECTION, SOLUTION INTRAMUSCULAR; INTRAVENOUS; SUBCUTANEOUS
Status: DISCONTINUED | OUTPATIENT
Start: 2021-10-19 | End: 2021-10-19 | Stop reason: HOSPADM

## 2021-10-19 RX ORDER — MEPERIDINE HYDROCHLORIDE 25 MG/ML
12.5 INJECTION INTRAMUSCULAR; INTRAVENOUS; SUBCUTANEOUS
Status: DISCONTINUED | OUTPATIENT
Start: 2021-10-19 | End: 2021-10-19 | Stop reason: HOSPADM

## 2021-10-19 RX ORDER — ONDANSETRON 2 MG/ML
4 INJECTION INTRAMUSCULAR; INTRAVENOUS
Status: COMPLETED | OUTPATIENT
Start: 2021-10-19 | End: 2021-10-19

## 2021-10-19 RX ORDER — LIDOCAINE 40 MG/G
CREAM TOPICAL
Status: DISCONTINUED | OUTPATIENT
Start: 2021-10-19 | End: 2021-10-19 | Stop reason: HOSPADM

## 2021-10-19 RX ORDER — HYDRALAZINE HYDROCHLORIDE 20 MG/ML
2.5-5 INJECTION INTRAMUSCULAR; INTRAVENOUS EVERY 10 MIN PRN
Status: DISCONTINUED | OUTPATIENT
Start: 2021-10-19 | End: 2021-10-19 | Stop reason: HOSPADM

## 2021-10-19 RX ORDER — FLUMAZENIL 0.1 MG/ML
0.2 INJECTION, SOLUTION INTRAVENOUS
Status: DISCONTINUED | OUTPATIENT
Start: 2021-10-19 | End: 2021-10-19 | Stop reason: HOSPADM

## 2021-10-19 RX ORDER — SODIUM CHLORIDE, SODIUM LACTATE, POTASSIUM CHLORIDE, CALCIUM CHLORIDE 600; 310; 30; 20 MG/100ML; MG/100ML; MG/100ML; MG/100ML
INJECTION, SOLUTION INTRAVENOUS CONTINUOUS
Status: DISCONTINUED | OUTPATIENT
Start: 2021-10-19 | End: 2021-10-19 | Stop reason: HOSPADM

## 2021-10-19 RX ORDER — LIDOCAINE HYDROCHLORIDE 20 MG/ML
INJECTION, SOLUTION INFILTRATION; PERINEURAL PRN
Status: DISCONTINUED | OUTPATIENT
Start: 2021-10-19 | End: 2021-10-19

## 2021-10-19 RX ORDER — IOPAMIDOL 510 MG/ML
INJECTION, SOLUTION INTRAVASCULAR PRN
Status: DISCONTINUED | OUTPATIENT
Start: 2021-10-19 | End: 2021-10-19 | Stop reason: HOSPADM

## 2021-10-19 RX ORDER — PROPOFOL 10 MG/ML
INJECTION, EMULSION INTRAVENOUS CONTINUOUS PRN
Status: DISCONTINUED | OUTPATIENT
Start: 2021-10-19 | End: 2021-10-19

## 2021-10-19 RX ORDER — PROPOFOL 10 MG/ML
INJECTION, EMULSION INTRAVENOUS PRN
Status: DISCONTINUED | OUTPATIENT
Start: 2021-10-19 | End: 2021-10-19

## 2021-10-19 RX ORDER — ONDANSETRON 4 MG/1
4 TABLET, ORALLY DISINTEGRATING ORAL EVERY 6 HOURS PRN
Status: DISCONTINUED | OUTPATIENT
Start: 2021-10-19 | End: 2021-10-19 | Stop reason: HOSPADM

## 2021-10-19 RX ORDER — PROCHLORPERAZINE MALEATE 5 MG
10 TABLET ORAL EVERY 6 HOURS PRN
Status: DISCONTINUED | OUTPATIENT
Start: 2021-10-19 | End: 2021-10-19 | Stop reason: HOSPADM

## 2021-10-19 RX ADMIN — LIDOCAINE HYDROCHLORIDE 80 MG: 20 INJECTION, SOLUTION INFILTRATION; PERINEURAL at 13:03

## 2021-10-19 RX ADMIN — MIDAZOLAM 2 MG: 1 INJECTION INTRAMUSCULAR; INTRAVENOUS at 12:55

## 2021-10-19 RX ADMIN — ONDANSETRON 4 MG: 2 INJECTION INTRAMUSCULAR; INTRAVENOUS at 13:22

## 2021-10-19 RX ADMIN — PROPOFOL 50 MG: 10 INJECTION, EMULSION INTRAVENOUS at 13:03

## 2021-10-19 RX ADMIN — SODIUM CHLORIDE, POTASSIUM CHLORIDE, SODIUM LACTATE AND CALCIUM CHLORIDE: 600; 310; 30; 20 INJECTION, SOLUTION INTRAVENOUS at 12:49

## 2021-10-19 RX ADMIN — PROPOFOL 30 MG: 10 INJECTION, EMULSION INTRAVENOUS at 13:17

## 2021-10-19 RX ADMIN — PROPOFOL 100 MCG/KG/MIN: 10 INJECTION, EMULSION INTRAVENOUS at 13:03

## 2021-10-19 ASSESSMENT — MIFFLIN-ST. JEOR: SCORE: 1224.63

## 2021-10-19 NOTE — ANESTHESIA PREPROCEDURE EVALUATION
Anesthesia Pre-Procedure Evaluation    Patient: Dinora Mcghee   MRN: [id number] : 1965        Preoperative Diagnosis: Malfunction of gastrostomy tube (H) [K94.23]   Procedure : Procedure(s):  REPLACEMENT, GASTROJEJUNOSTOMY TUBE, PERCUTANEOUS     Past Medical History:   Diagnosis Date     Allergic rhinitis, cause unspecified      Allergy to other foods     strawberries, apples, celeries, alice, watermelon     Arthritis     left knee     Choledocholithiasis     long after cholecystectomy     Chronic abdominal pain      Chronic constipation      Chronic nausea      Chronic pancreatitis (H)      Degeneration of lumbar or lumbosacral intervertebral disc      Esophageal reflux     w/ hiatal hernia     Gastroparesis      Hiatal hernia      History of pituitary adenoma     s/p resection     Hypothyroidism      Migraines      Mild hyperlipidemia      On tube feeding diet     presence of GJ tube     Pancreatic disease     PD stricture, 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 endometriosis growth     APPENDECTOMY       C  DELIVERY ONLY       C  DELIVERY ONLY      repeat c section with incidental cystotomy with repair     C EXCIS PITUITARY,TRANSNASAL/SEPTAL      pituitary tumor removed for diabetes insipidus     C TOTAL ABDOM HYSTERECTOMY      w/ bilateral salpingoophorectomy       SECTION       COLONOSCOPY       ENDOSCOPIC RETROGRADE CHOLANGIOPANCREATOGRAM N/A 2015    Procedure: ENDOSCOPIC RETROGRADE CHOLANGIOPANCREATOGRAM;  Surgeon: Mandeep Park MD;  [facility]: [facility]     ENDOSCOPIC RETROGRADE CHOLANGIOPANCREATOGRAM N/A 2016    Procedure: COMBINED ENDOSCOPIC RETROGRADE CHOLANGIOPANCREATOGRAPHY, PLACE  TUBE/STENT;  Surgeon: Mandeep Park MD;  [facility]     ENDOSCOPIC RETROGRADE CHOLANGIOPANCREATOGRAM N/A 3/17/2016    Procedure: COMBINED ENDOSCOPIC RETROGRADE CHOLANGIOPANCREATOGRAPHY, REMOVE FOREIGN BODY OR STENT/TUBE;  Surgeon: Mandeep Park MD;  [facility]: [facility]     ENDOSCOPIC RETROGRADE CHOLANGIOPANCREATOGRAM N/A 8/2/2016    Procedure: COMBINED ENDOSCOPIC RETROGRADE CHOLANGIOPANCREATOGRAPHY, PLACE TUBE/STENT;  Surgeon: Mandeep Park MD;  [facility]     ENDOSCOPIC RETROGRADE CHOLANGIOPANCREATOGRAM N/A 8/26/2016    Procedure: COMBINED ENDOSCOPIC RETROGRADE CHOLANGIOPANCREATOGRAPHY, REMOVE FOREIGN BODY OR STENT/TUBE;  Surgeon: Mandeep Park MD;  [facility]     ENDOSCOPIC ULTRASOUND UPPER GASTROINTESTINAL TRACT (GI) N/A 10/3/2016    Procedure: ENDOSCOPIC ULTRASOUND, ESOPHAGOSCOPY / UPPER GASTROINTESTINAL TRACT (GI);  Surgeon: Guru Jose Rodas MD;  [facility]     ESOPHAGOSCOPY, GASTROSCOPY, DUODENOSCOPY (EGD), COMBINED N/A 6/24/2015    Procedure: COMBINED ESOPHAGOSCOPY, GASTROSCOPY, DUODENOSCOPY (EGD), REMOVE FOREIGN BODY;  Surgeon: Mandeep Park MD;  [facility]     ESOPHAGOSCOPY, GASTROSCOPY, DUODENOSCOPY (EGD), COMBINED N/A 10/25/2015    Procedure: COMBINED ESOPHAGOSCOPY, GASTROSCOPY, DUODENOSCOPY (EGD);  Surgeon: Sammy Amaro MD;  [facility]     ESOPHAGOSCOPY, GASTROSCOPY, DUODENOSCOPY (EGD), COMBINED N/A 10/25/2015    Procedure: COMBINED ESOPHAGOSCOPY, GASTROSCOPY, DUODENOSCOPY (EGD), BIOPSY SINGLE OR MULTIPLE;  Surgeon: Sammy Amaro MD;  [facility]     ESOPHAGOSCOPY, GASTROSCOPY, DUODENOSCOPY (EGD), DILATATION, COMBINED       EXCISE LESION TRUNK N/A 4/17/2017    Procedure: EXCISE LESION TRUNK;  Removal of Abdominal Foreign Body;  Surgeon: Nestor Phoenix MD;  [facility]     HC ESOPH/GAS REFLUX TEST W NASAL IMPED >1 HR N/A 11/19/2015    Procedure: ESOPHAGEAL IMPEDENCE FUNCTION TEST WITH 24  HOUR PH GREATER THAN 1 HOUR;  Surgeon: Thiago Apple MD;  Location[facility]     HC UGI ENDOSCOPY DIAG W BIOPSY  9/17/08     HC UGI ENDOSCOPY DIAG W BIOPSY  9/27/12     HC UGI ENDOSCOPY W ESOPHAGEAL DILATION BALLOON <30MM  9/17/08     HC UGI ENDOSCOPY W EUS N/A 5/5/2015    Procedure: COMBINED ENDOSCOPIC ULTRASOUND, ESOPHAGOSCOPY, GASTROSCOPY, DUODENOSCOPY (EGD);  Surgeon: Wm Dueñas MD;  Location: [facility]     HC WRIST ARTHROSCOP,RELEASE XVERS LIG Bilateral 12/17/08     INJECT TRANSVERSUS ABDOMINIS PLANE (TAP) BLOCK BILATERAL Left 9/22/2016    Procedure: INJECT TRANSVERSUS ABDOMINIS PLANE (TAP) BLOCK BILATERAL;  Surgeon: Dickson Corrigan MD;  [facility]     laparoscopic pineda  1995     LAPAROSCOPIC HERNIORRHAPHY INCISIONAL N/A 8/23/2018    Procedure: LAPAROSCOPIC HERNIORRHAPHY INCISIONAL;  Laparoscopic Incisional Hernia Repair with Symbotex Mesh Implant;  Surgeon: Nestor Phoenix MD;  [facility]     LAPAROSCOPIC PANCREATECTOMY, TRANSPLANT AUTO ISLET CELL N/A 12/28/2016    Procedure: LAPAROSCOPIC PANCREATECTOMY, TRANSPLANT AUTO ISLET CELL;  Surgeon: Nestor Phoenix MD;  [facility]     REPLACE GASTROJEJUNOSTOMY TUBE, PERCUTANEOUS N/A 9/7/2021    Procedure: GASTROJEJUNOSTOMY TUBE PLACEMENT, PERCUTANEOUS, WITH GASTROPEXY;  Surgeon: Mandeep Park MD;  [facility]     REPLACE GASTROJEJUNOSTOMY TUBE, PERCUTANEOUS N/A 9/22/2021    Procedure: REPLACEMENT, GASTROJEJUNOSTOMY TUBE, PERCUTANEOUS;  Surgeon: Zackery Montoya MD;  [facility]     REPLACE JEJUNOSTOMY TUBE, PERCUTANEOUS N/A 9/10/2021    Procedure: UPPER ENDOSCOPY, REPLACEMENT OF PERCUTANEOUS GASTROJEJUNOSTOMY TUBE, T-TAG GASTROPEXY;  Surgeon: Zackery Montoya MD;  Location: [facility]     transphenoidal pituitary resection  1990      Allergies   Allergen Reactions     Apple Anaphylaxis     Corticosteroids Other (See Comments)     All oral, IV and injectable steroids are contraindicated (unless in life threatening  situations) in [facility] [employer] transplant recipients. They can cause irreversible loss of islet cell function. Please contact patient's transplant care coordinator ADI Gaffney RN at [phone]/pager [phone] and/or endocrinologist prior to administration.       Depakote [Divalproex Sodium] Other (See Comments)     Chest pain     Zithromax [Azithromycin Dihydrate] Anaphylaxis     Noted in 08 ER     Darvocet [Propoxyphene N-Apap] Itching     Morphine Nausea and Vomiting and Rash     Nalbuphine Hcl Rash     RASH :nubaine     Zosyn [Piperacillin-Tazobactam In D5w] Rash     Possible allergy, did have a diffuse rash that seemed drug related but could have also been related to soap in the hospital.      Bactrim [Sulfamethoxazole W-Trimethoprim] Other (See Comments) and Nausea and Vomiting     Severely low liver function.     Cats      Compazine [Prochlorperazine] Other (See Comments)     Twitching. Takes Benedryl and is fine     Dust Mites      Grass      Ragweeds      Tape [Adhesive Tape] Blisters     Tramadol      Trees      Zofran [Ondansetron] Other (See Comments)     migraine     Flagyl [Metronidazole] Hives and Rash      Social History     Tobacco Use     Smoking status: Former Smoker     Packs/day: 0.50     Years: 6.00     Pack years: 3.00     Types: Cigarettes     Start date: 1985     Quit date: 1992     Years since quittin.8     Smokeless tobacco: Never Used     Tobacco comment: no 2nd hand   Substance Use Topics     Alcohol use: Not Currently     Alcohol/week: 3.0 - 6.0 standard drinks     Types: 1 - 2 Glasses of wine, 1 - 2 Cans of beer, 1 - 2 Shots of liquor per week     Comment: none since IGG      Wt Readings from Last 1 Encounters:   10/19/21 59.7 kg (131 lb 9.8 oz)        Anesthesia Evaluation   Pt has had prior anesthetic. Type: General.    History of anesthetic complications  - PONV.  (responds well to scopolamine patch).    ROS/MED HX  ENT/Pulmonary: Comment: 3 pk yr hx, quit      (+) allergic rhinitis, tobacco use, Past use,  (-) asthma and sleep apnea   Neurologic:     (+) migraines,  (-) no seizures and no CVA   Cardiovascular:     (+) -----Previous cardiac testing   Echo: Date: Results:    Stress Test: Date: Results:    ECG Reviewed: Date: 8/7/21 Results:  Sinus rhythm  Cannot rule out Anterior infarct , age undetermined  Abnormal ECG   Ventricular rate 66 bpm     Cath: Date: Results:      METS/Exercise Tolerance: 4 - Raking leaves, gardening Comment: Endorses SOB when carrying laundry upstairs. Denies CP. +Fatigue, less stamina 2/2 malnutrition     Hematologic:  - neg hematologic  ROS  (-) history of blood clots and history of blood transfusion   Musculoskeletal: Comment: lumbago, chrondromalacia, stiff shoulder  - neg musculoskeletal ROS     GI/Hepatic: Comment: Hx hepatic dome lesion/IgG4 pseudotumor and elevated LFT's of unclear etiology    Chronic pancreatitis    Severe gastroparesis    Malnutrition, wt loss  Hiatal hernia     pt's tube clogged 10/18    (+) GERD, Asymptomatic on medication, hiatal hernia,     Renal/Genitourinary:  - neg Renal ROS     Endo: Comment: post-pancreatectomy diabetes; pancreatic insufficiency.     (+) thyroid problem, hypothyroidism,  (-) Type II DM   Psychiatric/Substance Use:  - neg psychiatric ROS     Infectious Disease:  - neg infectious disease ROS     Malignancy:  - neg malignancy ROS     Other:  - neg other ROS          Physical Exam    Airway        Mallampati: I    Neck ROM: full     Respiratory Devices and Support         Dental  no notable dental history         Cardiovascular   cardiovascular exam normal          Pulmonary   pulmonary exam normal                OUTSIDE LABS:  CBC:   Lab Results   Component Value Date    WBC 6.7 09/24/2021    WBC 6.6 09/23/2021    HGB 14.3 09/24/2021    HGB 13.2 09/23/2021    HCT 43.1 09/24/2021    HCT 40.1 09/23/2021     (H) 09/24/2021     (H) 09/23/2021     BMP:   Lab Results   Component Value  Date     09/24/2021     09/23/2021    POTASSIUM 4.1 09/24/2021    POTASSIUM 3.9 09/23/2021    CHLORIDE 101 09/24/2021    CHLORIDE 102 09/23/2021    CO2 31 09/24/2021    CO2 32 09/23/2021    BUN 11 09/24/2021    BUN 8 09/23/2021    CR 0.58 09/24/2021    CR 0.55 09/23/2021     (H) 09/24/2021     (H) 09/24/2021     COAGS:   Lab Results   Component Value Date    PTT 29 01/07/2017    INR 1.13 07/20/2021    FIBR 225 12/28/2016     POC:   Lab Results   Component Value Date    BGM 85 08/24/2018    HCG Negative 12/19/2016     HEPATIC:   Lab Results   Component Value Date    ALBUMIN 3.2 (L) 09/23/2021    PROTTOTAL 6.8 09/23/2021    ALT 63 (H) 09/23/2021    AST 35 09/23/2021    ALKPHOS 122 09/23/2021    BILITOTAL 0.4 09/23/2021     OTHER:   Lab Results   Component Value Date    PH 7.49 (H) 12/28/2016    LACT 0.7 09/23/2021    A1C 5.5 09/18/2021    KASSI 9.2 09/24/2021    PHOS 3.9 09/23/2021    MAG 2.0 09/23/2021    LIPASE 12 (L) 09/18/2021    AMYLASE 45 01/23/2017    TSH 0.67 09/16/2021    T4 1.13 03/23/2018    CRP 90.0 (H) 12/29/2016    SED 10 09/16/2021       Anesthesia Plan    ASA Status:  2   NPO Status:  NPO Appropriate    Anesthesia Type: MAC.     - Reason for MAC: straight local not clinically adequate              Consents    Anesthesia Plan(s) and associated risks, benefits, and realistic alternatives discussed. Questions answered and patient/representative(s) expressed understanding.     - Discussed with:  Patient      - Specific Concerns: PONV, sore throat, airway injury, major complications.     - Extended Intubation/Ventilatory Support Discussed: No.      - Patient is DNR/DNI Status: No    Use of blood products discussed: No .     Postoperative Care    Pain management: IV analgesics.   PONV prophylaxis: Ondansetron (or other 5HT-3), Scopolamine patch, Background Propofol Infusion     Comments:    Patient currently has no symptoms attributable to gastroparesis or GERD. After discussion with  Dr. Park, we feel she is an acceptable candidate for propofol-based sedation with HOB elevated and only after thorough emptying of the stomach via the G-tube. Will avoid Decadron as this is contraindicated in AIT patients.               Arsen Angulo MD  Anesthesiology Resident, CA-1

## 2021-10-19 NOTE — BRIEF OP NOTE
[facility] Brief Operative Note    Pre-operative diagnosis: Encounter for care related to feeding tube [Z46.59]   Post-operative diagnosis As prior   Procedure: Procedure(s):  REPLACEMENT, GASTROSTOMY TUBE, WITHOUT ENDOSCOPY   Surgeon(s): Surgeon(s) and Role:     * Mandeep Park MD - Primary     * Judit Paz MD   Estimated blood loss: * No values recorded between 10/19/2021  1:00 PM and 10/19/2021  1:40 PM *    Specimens: * No specimens in log *   Findings:        - TPIAT anatomy with widely patent duodenojejunostomy and jejunojejunostomy   - Uncomplicated exchange of clogged one piece weight GJ with a well positioned 18F 57cm one piece GJ without gastric curl and tip into the feeding limb           - Observe patient in PACU for possible discharge same day.   - J tube for feeds only and these sessions must be followed by water flushes; this may be used without delay   - G tube may be used for medications and venting without delay   - Activity and oral diet as tolerated may resume as may all medications   - The findings and recommendations were discussed with the patient and their family.

## 2021-10-19 NOTE — ANESTHESIA CARE TRANSFER NOTE
Patient: Dinora Mcghee    Procedure: Procedure(s):  REPLACEMENT, GASTROSTOMY TUBE, WITHOUT ENDOSCOPY       Diagnosis: Encounter for care related to feeding tube [Z46.59]  Diagnosis Additional Information: No value filed.    Anesthesia Type:   General     Note:    Oropharynx: oropharynx clear of all foreign objects and spontaneously breathing  Level of Consciousness: drowsy  Oxygen Supplementation: face mask  Level of Supplemental Oxygen (L/min / FiO2): 6  Independent Airway: airway patency satisfactory and stable  Dentition: dentition unchanged  Vital Signs Stable: post-procedure vital signs reviewed and stable  Report to RN Given: handoff report given  Patient transferred to: PACU    Handoff Report: Identifed the Patient, Identified the Reponsible Provider, Reviewed the pertinent medical history, Discussed the surgical course, Reviewed Intra-OP anesthesia mangement and issues during anesthesia, Set expectations for post-procedure period and Allowed opportunity for questions and acknowledgement of understanding      Vitals:  Vitals Value Taken Time   BP 98/66 10/19/21 1345   Temp 36.5  C (97.7  F) 10/19/21 1345   Pulse 76 10/19/21 1345   Resp 12 10/19/21 1345   SpO2 100 % 10/19/21 1345       Electronically Signed By: Arsen Angulo MD  October 19, 2021  1:55 PM

## 2021-10-19 NOTE — DISCHARGE INSTRUCTIONS
[facility], [place]  Same-Day Surgery   Adult Discharge Orders & Instructions       *Take it easy when you get home.  Remember, same day surgery DOES NOT MEAN SAME DAY RECOVERY!    *Healing is a gradual process and you will need some time to recover - you may be more tired than you realize at first.    *Rest and relax for at least the first 24 hours at home.  You'll feel better and heal faster if you take good care of yourself.    For 24 hours after surgery    1. A responsible adult must stay with you for at least 24 hours after you leave the hospital.   2. Do not drink alcohol, drive, or use heavy equipment for 24 hours. This is because you have had anesthesia medications.  3. Avoid strenuous and risky activities for 24 hours.   4. You may feel lightheaded, if so, sit for a few minutes before standing.  You may need someone to help you get up. Ask for help when climbing stairs.  5. If you have nausea: Drink only clear liquids such as water, juice, soda, or broth. Rest may also help. Be sure to drink enough fluids. Move to a  regular diet as you feel able.  6. You may have a slight fever. Call the doctor if your fever is over 100 F (37.7 C) (taken under the tongue) or lasts longer than 24 hours.  7. You might have a dry mouth, sore throat, muscle aches or trouble sleeping.  These should go away after 24 hours.  8. Do not make important or legal decisions.     Call your doctor for any of the followin.  Signs of infection: fever, growing tenderness at the surgery site, a large amount of drainage or bleeding, severe pain, foul-smelling drainage, redness, swelling. Please call if you experience any of these symptoms.    2. If it has been 8 to 10 hours since surgery and you are still not able to urinate (pass water), please call.    3.  If you have a headache for over 24 hours, please call.    To contact a doctor, call Dr Park's clinic at [phone]     or:    ' [phone] and  "ask for \"the resident on call for gastroenterology\" (this is the hospital and is answered 24 hours a day)    '   Emergency Department: [facility]: [phone]       (TTY for hearing impaired: [phone])    "

## 2021-10-21 ENCOUNTER — ANCILLARY PROCEDURE (OUTPATIENT)
Dept: GENERAL RADIOLOGY | Facility: CLINIC | Age: 56
End: 2021-10-21
Attending: INTERNAL MEDICINE
Payer: COMMERCIAL

## 2021-10-21 DIAGNOSIS — Z46.59 ENCOUNTER FOR CARE RELATED TO FEEDING TUBE: Primary | ICD-10-CM

## 2021-10-21 DIAGNOSIS — Z46.59 ENCOUNTER FOR CARE RELATED TO FEEDING TUBE: ICD-10-CM

## 2021-10-21 PROCEDURE — 74019 RADEX ABDOMEN 2 VIEWS: CPT | Mod: GC | Performed by: RADIOLOGY

## 2021-10-22 LAB — PROVATION GI EXAM: NORMAL

## 2021-10-25 DIAGNOSIS — E03.9 HYPOTHYROIDISM, UNSPECIFIED TYPE: ICD-10-CM

## 2021-10-27 RX ORDER — LEVOTHYROXINE SODIUM 137 UG/1
137 TABLET ORAL DAILY
Qty: 90 TABLET | Refills: 3 | Status: ON HOLD | OUTPATIENT
Start: 2021-10-27 | End: 2023-01-08

## 2021-10-27 NOTE — TELEPHONE ENCOUNTER
Last Clinic Visit: 9/16/2021 [facility] Internal Medicine [place]

## 2021-11-03 ENCOUNTER — VIRTUAL VISIT (OUTPATIENT)
Dept: GASTROENTEROLOGY | Facility: CLINIC | Age: 56
End: 2021-11-03
Payer: COMMERCIAL

## 2021-11-03 VITALS — BODY MASS INDEX: 22.08 KG/M2 | WEIGHT: 141 LBS

## 2021-11-03 DIAGNOSIS — Z91.89 AT HIGH RISK FOR ABNORMAL GASTROINTESTINAL MOTILITY: Primary | ICD-10-CM

## 2021-11-03 DIAGNOSIS — K59.09 OTHER CONSTIPATION: ICD-10-CM

## 2021-11-03 DIAGNOSIS — R10.84 ABDOMINAL PAIN, GENERALIZED: ICD-10-CM

## 2021-11-03 DIAGNOSIS — R63.4 WEIGHT LOSS: ICD-10-CM

## 2021-11-03 DIAGNOSIS — K31.84 GASTROPARESIS: ICD-10-CM

## 2021-11-03 DIAGNOSIS — Z94.83 STATUS POST PANCREAS TRANSPLANTATION (H): ICD-10-CM

## 2021-11-03 PROCEDURE — 99215 OFFICE O/P EST HI 40 MIN: CPT | Mod: 95 | Performed by: INTERNAL MEDICINE

## 2021-11-03 RX ORDER — CYCLOBENZAPRINE HCL 10 MG
10 TABLET ORAL 3 TIMES DAILY PRN
COMMUNITY
End: 2021-11-03

## 2021-11-03 RX ORDER — CYCLOBENZAPRINE HCL 10 MG
10 TABLET ORAL 2 TIMES DAILY PRN
Qty: 60 TABLET | Refills: 3 | Status: SHIPPED | OUTPATIENT
Start: 2021-11-03 | End: 2022-05-05

## 2021-11-03 NOTE — PROGRESS NOTES
"Dinora Mcghee is a 56 year old female who is being evaluated via a billable video visit.      The patient has been notified of following:     \"This video visit will be conducted via a call between you and your physician/provider. We have found that certain health care needs can be provided without the need for an in-person physical exam.  This service lets us provide the care you need with a video conversation.  If a prescription is necessary we can send it directly to your pharmacy.  If lab work is needed we can place an order for that and you can then stop by our lab to have the test done at a later time.    If during the course of the call the physician/provider feels a video visit is not appropriate, you will not be charged for this service.\"     Patient confirmed that they are in [place] for today's visit yes.    Video-Visit Details  Type of service:  Video Visit    Video Start Time: 2:02 pm  Video End Time:  3:03 pm    Originating Location (pt. Location): Home    Distant Location (provider location):  [facility] [place]     Platform used: iRule    ----------------------------------------------  GI CLINIC VISIT    CC: follow-up      ASSESSMENT/PLAN:  (Z91.89) At high risk for abnormal gastrointestinal motility  (primary encounter diagnosis)  (K31.84) Gastroparesis  (R10.84) Abdominal pain, generalized  (R63.4) Weight loss  (Z94.83) Status post pancreas transplantation (H)  (K59.09) Other constipation     Severe gastroparesis, progressing -   Postprandial pain, fullness, and nausea even with infrequent vomiting despite round-the-clock scopolamine, additional antiemetics and liquid-only diet. Prior failure of metoclopramide and prior anaphylaxis to azithromycin (2008 during an ED visit) so not able to use this or erythromycin as a prokinetic. Now back on TFs (via GJ) and improving (also with addition of flexeril and CDB oil as recommended by her other care team members). " "Has had issues with J-tube coiling and tube malfunctioning, being managed by advanced endoscopy/pancreas team.     We did spend some time today discussing Ms Loo's issues with motility (suspect more than just delayed gastric emptying given her issues with feeding tubes, constipation, etc). She has not been evaluated at a motility center in the past to assess for more global gut issues. This may be useful to fully define her diagnoses before considering additional therapies for GP such as  G-POEM (Ms. Loo had touched on this and other considerations for GP at her recent visit with Dr. Park) which had apparently come up in discussions with Dr. Park as well. We did review that the nearest Motility Center would be a [facility]. Now may not be the ideal timing for testing; will discuss further with Dr. Park.    For constipation, will continue with Motegrity at this time.     As discussed in my prior note \"If this needs to be stopped, we discussed a possible re-trial of Trulance --> would do a bowel cleanout first, then start Trulance and may be able to avoid marked bowel emptying with start of med (potentially avoid nighttime incontinence which occurred during first couple days of starting Trulance in the past).\"         - continue with TFs as you are  - continue with oral diet for pleasure as tolerated  - continue scopolamine,prn compazine, prn promethazine, prn diphenhydramine for breakthrough nausea  - noted use of flexeril and CBD oil  - continue current bowel regimen  - follow-up with pancreas clinic as planned  - follow-up in GI clinic with Dr. Genao    It was a pleasure to participate in the care of this patient; please contact us with any further questions.  A total of 61 face-to-face minutes was spent with this patient, >50% of which was counseling regarding the above delineated issues.     Vijaya Genao MD   of Medicine  Division of Gastroenterology, Hepatology and " Nutrition  [facility]    ---------------------------------------------------------------------------------------------------  HPI:    Ms. 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 She was initially seen in general GI clinic by this writer on 11/1/2017 and has been seen by myself and ANA Gan since that time. Her pancreas txplt surgeon was Dr. Phoenix; she now follows with Dr. Honeycutt. She also follows with Dr. Park of GI in [facility].     History summarized and updated from previous documentation from ANA Gan and myself. Please see previous notes for further details      Gastroparesis  Initially diagnosed years ago with 2-hour study gastric emptying study at [facility]. Repeat testing here with 4-hour GES on 6/27/2016 with 49% remaining at 4 hours (?on pain meds at the time).  This was addressed with prior GJ tube in fall 2016, NG tube used for venting. Ultimately, after TPIAT, was able to wean off of TFs and return to a regular diet.  Patient did have some persisting nausea and vomiting which she treated over the years with various medications including scopolamine patch, prochlorperazine (most mornings), and promethazine (most evenings).  She has also been seen by neurology and psychology to assist with insomnia and with migraines (and any contribution they may have to nausea).       Patient reports that she had been feeling pretty well up until early November 2020.  At that time she was seen at the [facility] emergency department on November 18 for marked nausea and emesis.  At that time,  blood work notable for normal BMP, low lipase, unremarkable hepatic panel.  CBC without elevation of WBCs though she did have elevated platelets, neutrophils and lymphocytes.  CT at that time with moderate stool burden, nonspecific fluid in small intestine.  No evidence of obstruction per documentation. Had similar episodes after this and dietary interventions were initiated as well as some work on her bowel regimen (no marked improvement after bowel cleanout). With some effort she was able to have some solid foods and focused on ingestion of 6-7 small meals a day. At some points she was on a liquid-only/juciing diet.  Initially these dietary changes seemed to help, but over time the response has waned. Beyond dietary interventions for GP, she has tried metoclopramide with no response. Has not tried erythromycin or azithromycin due to anaphylactic event with prior azithromycin use (in ED in 2008).  Symptoms did not improve with bowel cleanout as mentioned above. Another course of rifaximin (previously successful at addressing bloating and stool issues) was not helpful for her pain, bloating/fullness, and n/v. Amitriptyline was increased to 60 mg nightly with no change in discomfort.    From winter 2020 to May/[place] she lost about 16 lbs (from 156-->140 lbs). She continued to experience more pronounced post-prandial pain and fullness and had vomiting of food after eating. Work-up for alternate causes (with SBFT, CTE, CTA) was normal.      By August 2021, Ms. Loo was open to consideration of TF restart. She had, by no fault of her own, a complicated course with her prior TFs and was, understandably, hoping to avoid it. But despite maximal efforts on her part was continuing to lose weight (down to 125 lbs at her lowest) and experienced significant discomfort with oral intake.       Initially a GJ was placed in Sept 2021, but this was replaced a few times due to J-tube coiling in stomach as well as G-tube  "clogging/malfunction. Her most recent procedure was 10/19/21 with placement of a weighted GJ.     Today, Ms. Loo reports her weight is up to 132 lbs. She's not where she wants to be, but is happy she's \"gaining ground not losing ground.\" Her tube insertion site is \"uncomfortable\" but \"not painful.\" She's been frustrated by clogging issues and need for venting. Dr. Park prescribed flexeril which she finds helpful; she feels it might be making a difference in preventing her J-extension repositioning/coiling.     She also tried CBD oil tincture placed under the tongue and has found this helpful for nausea and appetite. Yesterday she felt hungry for the first time in awhile and was able to eat toast without issue. She's been working hard to eat something every day - typically rice or mashed potatoes. She drinks V8 as she tries to avoid raw vegetables. She also avoid meats. She is still drinking coffee some, adding Fairlife milk with protein to coffee to boost protein intake.  Yesterday. ,she notes that she didn't use her scopolamine patch, instead she took CBD oil for her nausea and found it helpful. She is still alternating compazine and phenergan - typically takes 1 of these a day. She's now added diphenhydramine most days as well.      Overall, Ms. Loo feels her HAs are better decreasing to just two migraines a month, after increase in amitriptyline to 60 mg at bedtime.    Her TFs rate was decreased and formula thinned to attempt to tolerate her feeds better. She is on [employer] formula which she feels gives better glucose control. Currently she is on 55 mL/hr x 24 hrs with enzyme continuously infusing.        Ms. Loo also shares that she is back to cooking for her  which she's found \"really joyful\". She is knitting a lot as well.       Constipation/irregular bowel movements/bloating  Patient reports history of ongoing constipation for over 20 years after having her first child.  She has tried " multiple interventions including regular bowel cleanouts, MiraLAX, senna, milk of magnesium, Linzess, and Amitiza for which she was on when she [facility].  Note, patient also takes creon.    Amitiza had worked for awhile then lost effectiveness. She was trialed on Trulance but reported frequent loose stools in the initial days (including nocturnal stooling and incontinence) which prompted her to stop. Trial of rifaximin in the past with with improvement in bloating and stool consistency though, as stated above, a recent re-trial for her pain, n/v, and fullness was not successful.    Motegrity was then started with some success, though noted a HA.     Today, she continues on Motegrity. She feels it's helpful and has not been interested in altering her bowel regimen. Though she reports it seems that she will have days without a BM, then has a marked period of empyting which is a bit unpredictable.        GERD, Dysphagia   Summarized/taken from prior documentation - not discussed today  Patient experiences GERD as substernal and throat burning with nocturnal relaxant causing choking. She had previously followed with Dr. George Flores in out clinic and reports a prior Schatzki's ring requiring previous dilation. Manometry was noral, and pH impedence had a DeMeester of 24 with positive symptoms association. Patient has treated GERD symptoms with BID PPI, Carafate, H2 blocker, and tums. At her last visit, she had been experiencing heartburn and has restarted omeprazole 40 mg daily with continued symptoms with specific food trigger.  Had also previously been on Zantac up to 300 mg a day.         PROBLEM LIST  Patient Active Problem List    Diagnosis Date Noted     Abdominal pain, generalized 09/18/2021     Priority: Medium     Adult failure to thrive 08/16/2021     Priority: Medium     Added automatically from request for surgery [id number]       Hypoglycemia 08/05/2021     Priority: Medium     Iron deficiency  anemia 03/22/2019     Priority: Medium     Headache, chronic migraine without aura 09/18/2018     Priority: Medium     Chronic pain syndrome 09/18/2018     Priority: Medium     S/P hernia repair 08/23/2018     Priority: Medium     Incisional hernia, without obstruction or gangrene 05/20/2018     Priority: Medium     Adhesive capsulitis of shoulder, unspecified laterality 11/14/2017     Priority: Medium     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  Document previously scanned on 1/12/17.  Validation form completed and scanned.  Code Status reflects choices in most recent ACP document..  Confirmed/documented designated decision maker(s).  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 Provider to review and confirm.       GERD (gastroesophageal reflux disease) 12/01/2010     Priority: Medium     Lumbago 04/18/2005     Priority: Medium     History of L5-S1 degenerative disk disease.         Sensorineural hearing loss 01/10/2005     Priority: Medium     Problem list name updated by automated process. Provider to review       Vertiginous syndrome and labyrinthine disorder 01/10/2005     Priority: Medium     Problem list name updated by automated process. Provider to review  IMO Regulatory Load OCT 2020       Sprain and strain of other specified sites of hip  and thigh 12/09/2002     Priority: Medium     Chondromalacia of patella 12/09/2002     Priority: Medium     Need for prophylactic immunotherapy      Priority: Medium     trees, grass, srw, dust mites, cat       Allergic rhinitis due to other allergen 12/21/2001     Priority: Medium     Hypothyroidism      Priority: Medium     Problem list name updated by automated process. Provider to review         PERTINENT MEDICATIONS:  Current Outpatient Medications   Medication     acetaminophen (TYLENOL) 500 MG tablet     amitriptyline (ELAVIL) 10 MG tablet     amitriptyline (ELAVIL) 50 MG tablet     azelastine (ASTELIN) 0.1 % nasal spray     blood glucose (PAT CONTOUR NEXT) test strip     blood glucose monitoring (PAT MICROLET) lancets     cetirizine (ZYRTEC) 10 MG tablet     Continuous Blood Gluc Sensor (FREESTYLE LISA 2 SENSOR) MISC     cyclobenzaprine (FLEXERIL) 10 MG tablet     diphenhydrAMINE (BENADRYL ALLERGY) 25 MG capsule     estradiol (VAGIFEM) 10 MCG TABS vaginal tablet     famotidine (PEPCID) 20 MG tablet     Glucagon (GVOKE HYPOPEN 1-PACK) 1 MG/0.2ML SOAJ     glucose 40 % GEL gel     ibuprofen (ADVIL/MOTRIN) 400 MG tablet     levothyroxine (SYNTHROID/LEVOTHROID) 137 MCG tablet     montelukast (SINGULAIR) 10 MG tablet     omeprazole (PRILOSEC) 40 MG DR capsule     order for DME     polyethylene glycol (MIRALAX) 17 GM/Dose powder     prochlorperazine (COMPAZINE) 5 MG tablet     promethazine (PHENERGAN) 25 MG tablet     Prucalopride Succinate (MOTEGRITY) 2 MG TABS     scopolamine (TRANSDERM) 1 MG/3DAYS 72 hr patch     senna-docusate (SENOKOT-S;PERICOLACE) 8.6-50 MG per tablet     Sharps Container (BD SHARPS ) MISC     ZOLMitriptan (ZOMIG) 5 MG tablet     No current facility-administered medications for this visit.     PHYSICAL EXAMINATION:  Vitals Wt 64 kg (141 lb)   BMI 22.08 kg/m     Wt   Wt Readings from Last 2 Encounters:   11/03/21 64 kg (141 lb)   10/19/21 59.7 kg (131 lb 9.8 oz)      Gen: Pt  sitting up in NAD, interactive and cooperative on exam  Eyes: sclerae anicteric, no injection  ENT:  MMM  Resp: Breathing comfortably on exam, speaking in full sentences  Skin: No jaundice  Neuro: alert, oriented, answers questions appropriately      PERTINENT STUDIES:    Lab on 10/18/2021   Component Date Value Ref Range Status     SARS CoV2 PCR 10/18/2021 Negative  Negative Final

## 2021-11-03 NOTE — LETTER
11/3/2021         RE: Dinora Mcghee  [address] [facility] [address]        Dear Colleague,    Thank you for referring your patient, Dinora Mcghee, to the [facility] [place]. Please see a copy of my visit note below.  ----------------------------------------------  GI CLINIC VISIT    CC: follow-up      ASSESSMENT/PLAN:  (Z91.89) At high risk for abnormal gastrointestinal motility  (primary encounter diagnosis)  (K31.84) Gastroparesis  (R10.84) Abdominal pain, generalized  (R63.4) Weight loss  (Z94.83) Status post pancreas transplantation (H)  (K59.09) Other constipation     Severe gastroparesis, progressing -   Postprandial pain, fullness, and nausea even with infrequent vomiting despite round-the-clock scopolamine, additional antiemetics and liquid-only diet. Prior failure of metoclopramide and prior anaphylaxis to azithromycin (2008 during an ED visit) so not able to use this or erythromycin as a prokinetic. Now back on TFs (via GJ) and improving (also with addition of flexeril and CDB oil as recommended by her other care team members). Has had issues with J-tube coiling and tube malfunctioning, being managed by advanced endoscopy/pancreas team.     We did spend some time today discussing Ms Loo's issues with motility (suspect more than just delayed gastric emptying given her issues with feeding tubes, constipation, etc). She has not been evaluated at a motility center in the past to assess for more global gut issues. This may be useful to fully define her diagnoses before considering additional therapies for GP such as  G-POEM (Ms. Loo had touched on this and other considerations for GP at her recent visit with Dr. Park) which had apparently come up in discussions with Dr. Prak as well. We did review that the nearest Motility Center would be a [facility]. Now may not be the ideal timing for testing; will discuss further with Dr. Park.    For  "constipation, will continue with Motegrity at this time.     As discussed in my prior note \"If this needs to be stopped, we discussed a possible re-trial of Trulance --> would do a bowel cleanout first, then start Trulance and may be able to avoid marked bowel emptying with start of med (potentially avoid nighttime incontinence which occurred during first couple days of starting Trulance in the past).\"         - continue with TFs as you are  - continue with oral diet for pleasure as tolerated  - continue scopolamine,prn compazine, prn promethazine, prn diphenhydramine for breakthrough nausea  - noted use of flexeril and CBD oil  - continue current bowel regimen  - follow-up with pancreas clinic as planned  - follow-up in GI clinic with Dr. Genao    It was a pleasure to participate in the care of this patient; please contact us with any further questions.  A total of 61 face-to-face minutes was spent with this patient, >50% of which was counseling regarding the above delineated issues.     Vijaya Genao MD   of Medicine  Division of Gastroenterology, Hepatology and Nutrition  [facility]    ---------------------------------------------------------------------------------------------------  HPI:    Ms. 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 She was initially seen in general GI clinic by this writer on 11/1/2017 and has been seen by myself and ANA Gan since that time. Her pancreas txplt surgeon was Dr. Phoenix; she now follows with Dr. Honyecutt. She also follows with Dr. Park of GI in Pancreas-Biliary " Clinic.     History summarized and updated from previous documentation from ANA Gan and myself. Please see previous notes for further details      Gastroparesis  Initially diagnosed years ago with 2-hour study gastric emptying study at [facility]. Repeat testing here with 4-hour GES on 6/27/2016 with 49% remaining at 4 hours (?on pain meds at the time).  This was addressed with prior GJ tube in fall 2016, NG tube used for venting. Ultimately, after TPIAT, was able to wean off of TFs and return to a regular diet.  Patient did have some persisting nausea and vomiting which she treated over the years with various medications including scopolamine patch, prochlorperazine (most mornings), and promethazine (most evenings).  She has also been seen by neurology and psychology to assist with insomnia and with migraines (and any contribution they may have to nausea).       Patient reports that she had been feeling pretty well up until early November 2020.  At that time she was seen at the [facility] emergency department on November 18 for marked nausea and emesis.  At that time, blood work notable for normal BMP, low lipase, unremarkable hepatic panel.  CBC without elevation of WBCs though she did have elevated platelets, neutrophils and lymphocytes.  CT at that time with moderate stool burden, nonspecific fluid in small intestine.  No evidence of obstruction per documentation. Had similar episodes after this and dietary interventions were initiated as well as some work on her bowel regimen (no marked improvement after bowel cleanout). With some effort she was able to have some solid foods and focused on ingestion of 6-7 small meals a day. At some points she was on a liquid-only/juciing diet.  Initially these dietary changes seemed to help, but over time the response has waned. Beyond dietary interventions for GP, she has tried metoclopramide with no response. Has not tried erythromycin or azithromycin  "due to anaphylactic event with prior azithromycin use (in ED in 2008).  Symptoms did not improve with bowel cleanout as mentioned above. Another course of rifaximin (previously successful at addressing bloating and stool issues) was not helpful for her pain, bloating/fullness, and n/v. Amitriptyline was increased to 60 mg nightly with no change in discomfort.    From winter 2020 to May/Saba she lost about 16 lbs (from 156-->140 lbs). She continued to experience more pronounced post-prandial pain and fullness and had vomiting of food after eating. Work-up for alternate causes (with SBFT, CTE, CTA) was normal.      By August 2021, Ms. Loo was open to consideration of TF restart. She had, by no fault of her own, a complicated course with her prior TFs and was, understandably, hoping to avoid it. But despite maximal efforts on her part was continuing to lose weight (down to 125 lbs at her lowest) and experienced significant discomfort with oral intake.       Initially a GJ was placed in Sept 2021, but this was replaced a few times due to J-tube coiling in stomach as well as G-tube clogging/malfunction. Her most recent procedure was 10/19/21 with placement of a weighted GJ.     Today, Ms. Loo reports her weight is up to 132 lbs. She's not where she wants to be, but is happy she's \"gaining ground not losing ground.\" Her tube insertion site is \"uncomfortable\" but \"not painful.\" She's been frustrated by clogging issues and need for venting. Dr. Park prescribed flexeril which she finds helpful; she feels it might be making a difference in preventing her J-extension repositioning/coiling.     She also tried CBD oil tincture placed under the tongue and has found this helpful for nausea and appetite. Yesterday she felt hungry for the first time in awhile and was able to eat toast without issue. She's been working hard to eat something every day - typically rice or mashed potatoes. She drinks V8 as she tries to avoid " "raw vegetables. She also avoid meats. She is still drinking coffee some, adding Fairlife milk with protein to coffee to boost protein intake.  Yesterday. ,she notes that she didn't use her scopolamine patch, instead she took CBD oil for her nausea and found it helpful. She is still alternating compazine and phenergan - typically takes 1 of these a day. She's now added diphenhydramine most days as well.      Overall, Ms. Loo feels her HAs are better decreasing to just two migraines a month, after increase in amitriptyline to 60 mg at bedtime.    Her TFs rate was decreased and formula thinned to attempt to tolerate her feeds better. She is on [employer] formula which she feels gives better glucose control. Currently she is on 55 mL/hr x 24 hrs with enzyme continuously infusing.        Ms. Loo also shares that she is back to cooking for her  which she's found \"really joyful\". She is knitting a lot as well.       Constipation/irregular bowel movements/bloating  Patient reports history of ongoing constipation for over 20 years after having her first child.  She has tried multiple interventions including regular bowel cleanouts, MiraLAX, senna, milk of magnesium, Linzess, and Amitiza for which she was on when she first [facility].  Note, patient also takes creon.    Amitiza had worked for awhile then lost effectiveness. She was trialed on Trulance but reported frequent loose stools in the initial days (including nocturnal stooling and incontinence) which prompted her to stop. Trial of rifaximin in the past with with improvement in bloating and stool consistency though, as stated above, a recent re-trial for her pain, n/v, and fullness was not successful.    Motegrity was then started with some success, though noted a HA.     Today, she continues on Motegrity. She feels it's helpful and has not been interested in altering her bowel regimen. Though she reports it seems that she will have days without " a BM, then has a marked period of empyting which is a bit unpredictable.        GERD, Dysphagia   Summarized/taken from prior documentation - not discussed today  Patient experiences GERD as substernal and throat burning with nocturnal relaxant causing choking. She had previously followed with Dr. George Flores in out clinic and reports a prior Schatzki's ring requiring previous dilation. Manometry was noral, and pH impedence had a DeMeester of 24 with positive symptoms association. Patient has treated GERD symptoms with BID PPI, Carafate, H2 blocker, and tums. At her last visit, she had been experiencing heartburn and has restarted omeprazole 40 mg daily with continued symptoms with specific food trigger.  Had also previously been on Zantac up to 300 mg a day.         PROBLEM LIST  Patient Active Problem List    Diagnosis Date Noted     Abdominal pain, generalized 09/18/2021     Priority: Medium     Adult failure to thrive 08/16/2021     Priority: Medium     Added automatically from request for surgery [id number]       Hypoglycemia 08/05/2021     Priority: Medium     Iron deficiency anemia 03/22/2019     Priority: Medium     Headache, chronic migraine without aura 09/18/2018     Priority: Medium     Chronic pain syndrome 09/18/2018     Priority: Medium     S/P hernia repair 08/23/2018     Priority: Medium     Incisional hernia, without obstruction or gangrene 05/20/2018     Priority: Medium     Adhesive capsulitis of shoulder, unspecified laterality 11/14/2017     Priority: Medium     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  Document previously scanned on 1/12/17.  Validation form completed and scanned.  Code Status reflects choices in most recent ACP  document..  Confirmed/documented designated decision maker(s).  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 Provider to review and confirm.       GERD (gastroesophageal reflux disease) 12/01/2010     Priority: Medium     Lumbago 04/18/2005     Priority: Medium     History of L5-S1 degenerative disk disease.         Sensorineural hearing loss 01/10/2005     Priority: Medium     Problem list name updated by automated process. Provider to review       Vertiginous syndrome and labyrinthine disorder 01/10/2005     Priority: Medium     Problem list name updated by automated process. Provider to review  IMO Regulatory Load OCT 2020       Sprain and strain of other specified sites of hip and thigh 12/09/2002     Priority: Medium     Chondromalacia of patella 12/09/2002     Priority: Medium     Need for prophylactic immunotherapy      Priority: Medium     trees, grass, srw, dust mites, cat       Allergic rhinitis due to other allergen 12/21/2001     Priority: Medium     Hypothyroidism      Priority: Medium     Problem list name updated by automated process. Provider to review         PERTINENT MEDICATIONS:  Current Outpatient Medications   Medication     acetaminophen (TYLENOL) 500 MG tablet     amitriptyline (ELAVIL) 10 MG tablet     amitriptyline (ELAVIL) 50 MG tablet     azelastine (ASTELIN) 0.1 % nasal spray     blood glucose (PAT CONTOUR NEXT) test strip     blood glucose monitoring (PAT MICROLET) lancets     cetirizine (ZYRTEC) 10 MG tablet     Continuous Blood Gluc Sensor (FREESTYLE LISA 2 SENSOR) [facility]      cyclobenzaprine (FLEXERIL) 10 MG tablet     diphenhydrAMINE (BENADRYL ALLERGY) 25 MG capsule     estradiol (VAGIFEM) 10 MCG TABS vaginal tablet     famotidine (PEPCID) 20 MG tablet     Glucagon (GVOKE HYPOPEN 1-PACK) 1 MG/0.2ML SOAJ     glucose 40 % GEL gel     ibuprofen (ADVIL/MOTRIN) 400 MG tablet     levothyroxine (SYNTHROID/LEVOTHROID) 137 MCG tablet     montelukast (SINGULAIR) 10 MG tablet     omeprazole (PRILOSEC) 40 MG DR capsule     order for DME     polyethylene glycol (MIRALAX) 17 GM/Dose powder     prochlorperazine (COMPAZINE) 5 MG tablet     promethazine (PHENERGAN) 25 MG tablet     Prucalopride Succinate (MOTEGRITY) 2 MG TABS     scopolamine (TRANSDERM) 1 MG/3DAYS 72 hr patch     senna-docusate (SENOKOT-S;PERICOLACE) 8.6-50 MG per tablet     Sharps Container (BD SHARPS ) MISC     ZOLMitriptan (ZOMIG) 5 MG tablet     No current facility-administered medications for this visit.     PHYSICAL EXAMINATION:  Vitals Wt 64 kg (141 lb)   BMI 22.08 kg/m     Wt   Wt Readings from Last 2 Encounters:   11/03/21 64 kg (141 lb)   10/19/21 59.7 kg (131 lb 9.8 oz)      Gen: Pt sitting up in NAD, interactive and cooperative on exam  Eyes: sclerae anicteric, no injection  ENT:  MMM  Resp: Breathing comfortably on exam, speaking in full sentences  Skin: No jaundice  Neuro: alert, oriented, answers questions appropriately      PERTINENT STUDIES:    Lab on 10/18/2021   Component Date Value Ref Range Status     SARS CoV2 PCR 10/18/2021 Negative  Negative Final       Again, thank you for allowing me to participate in the care of your patient.      Sincerely,    Vijaya Genao MD

## 2021-11-03 NOTE — NURSING NOTE
Chief Complaint   Patient presents with     Follow Up       Vitals:    11/03/21 1310   Weight: 64 kg (141 lb)       Body mass index is 22.08 kg/m .    Saundra Jaquez CMA

## 2021-11-04 DIAGNOSIS — N95.2 ATROPHIC VAGINITIS: ICD-10-CM

## 2021-11-08 RX ORDER — ESTRADIOL 10 UG/1
INSERT VAGINAL
Qty: 24 TABLET | Refills: 2 | Status: SHIPPED | OUTPATIENT
Start: 2021-11-08 | End: 2023-03-06

## 2021-11-09 ENCOUNTER — MEDICAL CORRESPONDENCE (OUTPATIENT)
Dept: HEALTH INFORMATION MANAGEMENT | Facility: CLINIC | Age: 56
End: 2021-11-09
Payer: COMMERCIAL

## 2021-11-09 DIAGNOSIS — Z53.9 DIAGNOSIS NOT YET DEFINED: Primary | ICD-10-CM

## 2021-11-11 ENCOUNTER — VIRTUAL VISIT (OUTPATIENT)
Dept: PSYCHOLOGY | Facility: CLINIC | Age: 56
End: 2021-11-11
Payer: COMMERCIAL

## 2021-11-11 DIAGNOSIS — F43.23 ADJUSTMENT DISORDER WITH MIXED ANXIETY AND DEPRESSED MOOD: Primary | ICD-10-CM

## 2021-11-11 PROCEDURE — 90837 PSYTX W PT 60 MINUTES: CPT | Mod: 95 | Performed by: STUDENT IN AN ORGANIZED HEALTH CARE EDUCATION/TRAINING PROGRAM

## 2021-11-11 NOTE — PROGRESS NOTES
"  Health Psychology                                                                                                                          Sweta Michelle, Ph.D., L.P [phone]  Melva Beebe, Ph.D., L.P. [phone]  Rachel Sloan, Ph.D. [phone]  Kenya Sanchez, Ph.D., L.P. [phone]  Estrada Patel, Ph.D., A.B.P.P., L.P. [phone]         Enedina Joaquin, Ph.D., L.P. [phone]                [facility], 3rd Floor  [address] [place] [address]     Health Psychology Follow-up Visit - Telehealth Visit     Dinora Mcghee is a 56 year old female who is being evaluated via a billable video visit.       The patient has been notified of following:      \"This video visit will be conducted via a video visit between you and your physician/provider. We have found that certain health care needs can be provided without the need for an in-person physical exam.  This service lets us provide the care you need with a video conversation.  If a prescription is necessary we can send it directly to your pharmacy.  If lab work is needed we can place an order for that and you can then stop by our lab to have the test done at a later time.     Video visits are billed at different rates depending on your insurance coverage.  Please reach out to your insurance provider with any questions.     If during the course of the call the physician/provider feels a video visit is not appropriate, you will not be charged for this service.\"     Patient has given verbal consent for Video visit? Yes     Will anyone else be joining your video visit? No    Date of Service:  11/11/21    SUBJECTIVE:  Dinora Mcghee was seen for individual psychotherapy. Reviewed emotional and physical health since our last session.    Symptoms reported: Dinora had one more procedure three weeks ago but has been experiencing improvements in fatigue and energy since then.  She reported good mood due to " use of coping skills and being selective with activities she engages in. Feeling confident navigating this stage of her health journey. Nausea fluctuating. Eating more food orally, able to hold off on feeding at night occasionally.     Progress towards goals: Engaging in activity by doing one outing/activity/day, outside of work. Also being intentional about who she spends time with. Dinora continues to use adaptive coping skills to cultivate mindfulness and gratitude. This helps to manage mood and acceptance. Upcoming visit with Dr. Park to discuss options for managing motility issues. Has engaged in cooking lately, even if she is not eating the result, she has enjoyed doing it and feels that she is contributing more to her household.     Interventions utilized: Reviewed effectiveness of coping skills in interim and how she is determining activity levels/day. Discussed her framework for approaching this stage of her recovery and how to continue to focus on the present and apply her strengths. Encouraged ongoing cooing.     Ms. Mcghee was engaged throughout the visit and expressed understanding of information provided.     OBJECTIVE:  Ms. Mcghee was available for scheduled visit.  She reported sad mood, anxious.  Affect was mood- and thought-congruent.  Tearful at times.  Insight and judgment good.  Thought processes logical and linear. Speech WNL.  Denied suicidal ideation.     ASSESSMENT:  Psychological Assessment:  The PHQ-9 is an instrument for screening, diagnosing, monitoring and measuring the severity of depression. Scores of 5, 10, 15, and 20 represent cutpoints for mild, moderate, moderately severe and severe depression, respectively.   PHQ-9 SCORE 7/1/2019 6/21/2021 7/8/2021   PHQ-9 Total Score MyChart 3 (Minimal depression) 13 (Moderate depression) 13 (Moderate depression)   PHQ-9 Total Score 3 13 13       The DARCY-7 is an instrument for screening, diagnosing, monitoring and measuring the severity of  anxiety. Scores of 5, 10, and 15 represent cutpoints for mild, moderate, and severe anxiety, respectively.  DARCY-7 SCORE 1/11/2018 1/22/2018 6/21/2021   Total Score 2 (minimal anxiety) - 0 (minimal anxiety)   Total Score 2 2 0     Dinora reports improvement in stress and depression as she has moved through process of initiating nutritional support. Plan is to meet at least one more time to assess maintenance of symptom improvement and discuss plans for mental health support moving forward.     DIAGNOSIS:  Adjustment disorder with mixed anxiety and depressed mood.     PLAN:  Follow-up appointment scheduled for 12/2/21 at 9:00     Type of service: Telemedicine Psychotherapy for coping with health conditions and symptoms of depression and anxiety  Time of service:   ? Date: 11/11/21  ? Time Service Began: 9:00  ? Time Service Ended: 9:55  Reason that telemedicine is appropriate: Public health regulations due to COVID-19 pandemic/state of emergency  Mode of transmission: Secure real time interactive audio and visual telecommunication system via [email or web address]  Location of originating and distant sites:  ? Originating site (patient location): Patient's home  ? Distant site (provider site): Provider's home    Rachel Sloan, PhD,   Health Psychology Fellow    Tx plan completed: 7/8/21  Tx plan due:  7/8/22    I did not see this patient directly. I have read and agree with the contents of this note. Kenya Sanchez, PhD, , November 12, 2021

## 2021-11-15 ENCOUNTER — VIRTUAL VISIT (OUTPATIENT)
Dept: GASTROENTEROLOGY | Facility: CLINIC | Age: 56
End: 2021-11-15
Payer: COMMERCIAL

## 2021-11-15 DIAGNOSIS — R10.84 ABDOMINAL PAIN, GENERALIZED: Primary | ICD-10-CM

## 2021-11-15 PROCEDURE — 99215 OFFICE O/P EST HI 40 MIN: CPT | Mod: 95 | Performed by: INTERNAL MEDICINE

## 2021-11-15 ASSESSMENT — ENCOUNTER SYMPTOMS
BLOOD IN STOOL: 0
DIZZINESS: 1
HYPOTENSION: 1
EYE PAIN: 1
DECREASED CONCENTRATION: 0
WEIGHT LOSS: 1
DIARRHEA: 1
SINUS CONGESTION: 1
HOARSE VOICE: 1
SMELL DISTURBANCE: 0
CHILLS: 1
NAUSEA: 1
ORTHOPNEA: 0
DOUBLE VISION: 0
TREMORS: 0
INSOMNIA: 1
NECK MASS: 0
SEIZURES: 0
SINUS PAIN: 1
BOWEL INCONTINENCE: 0
VOMITING: 1
EYE IRRITATION: 1
ALTERED TEMPERATURE REGULATION: 1
PARALYSIS: 0
LEG PAIN: 0
SLEEP DISTURBANCES DUE TO BREATHING: 0
POLYDIPSIA: 0
WEAKNESS: 0
TINGLING: 0
HEARTBURN: 1
FEVER: 0
BLOATING: 1
ABDOMINAL PAIN: 1
SYNCOPE: 1
DECREASED APPETITE: 1
HALLUCINATIONS: 0
EXERCISE INTOLERANCE: 1
NERVOUS/ANXIOUS: 0
PALPITATIONS: 0
HYPERTENSION: 0
NIGHT SWEATS: 0
INCREASED ENERGY: 1
LOSS OF CONSCIOUSNESS: 0
EYE WATERING: 0
POLYPHAGIA: 0
SPEECH CHANGE: 0
FATIGUE: 1
LIGHT-HEADEDNESS: 1
DISTURBANCES IN COORDINATION: 0
DEPRESSION: 1
TROUBLE SWALLOWING: 1
PANIC: 0
NUMBNESS: 0
MEMORY LOSS: 1
RECTAL PAIN: 0
HEADACHES: 1
WEIGHT GAIN: 1
TASTE DISTURBANCE: 0
JAUNDICE: 0
EYE REDNESS: 1
CONSTIPATION: 1
SORE THROAT: 1

## 2021-11-15 NOTE — H&P (VIEW-ONLY)
Dinora is a 56 year old who is being evaluated via a billable video visit.      How would you like to obtain your AVS? MyChart  If the video visit is dropped, the invitation should be resent by: Send to e-mail at: [email or web address]  Will anyone else be joining your video visit? No    Video Start Time: 11:08 PM, end time 12:01PM    Video-Visit Details    Type of service:  Video Visit       Originating Location (pt. Location): Home    Distant Location (provider location):  [facility] [place]     Platform used for Video Visit: Emeli     Spent 45 minutes reviewing current status. Generally, doing much better. No pain, able to tolerate only limited amounts of PO intake. Venting G tube about 3/week.  Waking every 3 hours to flush GJ, based on occlusion in past, and our recommendations. Leading to sleep deprivation. Most of visit exploring different issues w GJ tube. Pulling inwards w small bowel contractions, somewhat better. Mostly focused on interference of external GJ w exercise, intrusion into daily life and limits on activity, intimacy, sleep deprivation for discomfort from tube, flushing tube, and other aspects. Also general deconditioning after many months of malnutrition, and 4 interventions to ultimately achieve proper placement and retention of 57cm weighted GJ tube.     Discussed that other option is separate J tube for feeding, and G tube for venting. Could ultimately be low profile, which would benefit many QOL issues, physical, and social. Discussed that Dr Montoya is far and away the best expert at direct J placement and would ask him to perform such at next revision of GJ, which should be delayed to allow maximum recovery from multiple interventions. As pt making steady tho slow progress to better QOL and activity.    Also discussed options for whole gut motility testing, as suggested by Dr Genao. And JUANIS ESPOSITO for gastroparesis. Concerned that unlikely that JUANIS ESPOSITO  would help her if jejunal dysfunction. Either way, not great timing to consider another major intervention as she has had so many, and is finally doing better. Therefore hold off on outside motility referral, and consider separate G and J w low profile tubes as ultimate goal, to allow better physical and social activity and function.    REC:  1) decrease tube flushing at night, to at least every 5 hours to avoid sleep deprivation, and disruption. Could extend to 6-7 hour interval at night, then resume q 3 hrs during awake hours.  2) At next GJ tube change, to be delayed until dysfunction of current tube, ask Dr Montoya to consider BOTOX plyorus, while changing to LOW PROFILE gj , eventually separate G and J lo profile.   3) Follow-up Dr Park in 2 months

## 2021-11-15 NOTE — LETTER
11/15/2021         RE: Dinora Mcghee  [address]  [place] [address]      Dinora is a 56 year old who is being evaluated via a billable video visit.      How would you like to obtain your AVS? MyChart  If the video visit is dropped, the invitation should be resent by: Send to e-mail at: [email or web address]  Will anyone else be joining your video visit? No    Video Start Time: 11:08 PM, end time 12:01PM    Video-Visit Details    Type of service:  Video Visit       Originating Location (pt. Location): Home    Distant Location (provider location):  [facility] [place]     Platform used for Video Visit: Emeli     Spent 45 minutes reviewing current status. Generally, doing much better. No pain, able to tolerate only limited amounts of PO intake. Venting G tube about 3/week.  Waking every 3 hours to flush GJ, based on occlusion in past, and our recommendations. Leading to sleep deprivation. Most of visit exploring different issues w GJ tube. Pulling inwards w small bowel contractions, somewhat better. Mostly focused on interference of external GJ w exercise, intrusion into daily life and limits on activity, intimacy, sleep deprivation for discomfort from tube, flushing tube, and other aspects. Also general deconditioning after many months of malnutrition, and 4 interventions to ultimately achieve proper placement and retention of 57cm weighted GJ tube.     Discussed that other option is separate J tube for feeding, and G tube for venting. Could ultimately be low profile, which would benefit many QOL issues, physical, and social. Discussed that Dr Montoya is far and away the best expert at direct J placement and would ask him to perform such at next revision of GJ, which should be delayed to allow maximum recovery from multiple interventions. As pt making steady tho slow progress to better QOL and activity.    Also discussed options for whole gut motility testing, as  CRITICAL CARE ATTENDING - CTICU    MEDICATIONS  (STANDING):  aspirin 325 milliGRAM(s) Oral daily  calcium gluconate IVPB 1 Gram(s) IV Intermittent once  chlorhexidine 0.12% Liquid 15 milliLiter(s) Oral Mucosa two times a day  chlorhexidine 4% Liquid 1 Application(s) Topical <User Schedule>  CRRT Treatment    <Continuous>  esMOLOL  Infusion 50 MICROgram(s)/kG/Min (23.85 mL/Hr) IV Continuous <Continuous>  heparin  Infusion 1300 Unit(s)/Hr (8.5 mL/Hr) IV Continuous <Continuous>  hydrocortisone sodium succinate Injectable 50 milliGRAM(s) IV Push every 6 hours  insulin lispro (HumaLOG) corrective regimen sliding scale   SubCutaneous every 6 hours  linezolid  IVPB 600 milliGRAM(s) IV Intermittent every 12 hours  meropenem  IVPB 1000 milliGRAM(s) IV Intermittent every 8 hours  norepinephrine Infusion 0.034 MICROgram(s)/kG/Min (5 mL/Hr) IV Continuous <Continuous>  pantoprazole  Injectable 40 milliGRAM(s) IV Push two times a day  PrismaSATE Dialysate BK 0 / 3.5 5000 milliLiter(s) (1500 mL/Hr) CRRT <Continuous>  PrismaSOL Filtration BGK 0 / 2.5 5000 milliLiter(s) (1000 mL/Hr) CRRT <Continuous>  PrismaSOL Filtration BGK 0 / 2.5 5000 milliLiter(s) (200 mL/Hr) CRRT <Continuous>  propofol Infusion 10 MICROgram(s)/kG/Min (4.77 mL/Hr) IV Continuous <Continuous>  sodium chloride 0.9%. 1000 milliLiter(s) (10 mL/Hr) IV Continuous <Continuous>  thiamine Injectable 200 milliGRAM(s) IV Push <User Schedule>  vasopressin Infusion 0.083 Unit(s)/Min (5 mL/Hr) IV Continuous <Continuous>                                    9.7    39.5  )-----------( 138      ( 17 May 2019 00:45 )             28.9           138  |  99  |  82<H>  ----------------------------<  113<H>  5.6<H>   |  14<L>  |  2.27<H>    Ca    7.1<L>      17 May 2019 00:45  Phos  9.6       Mg     2.6         TPro  5.0<L>  /  Alb  2.4<L>  /  TBili  2.2<H>  /  DBili  x   /  AST  1106<H>  /  ALT  1004<H>  /  AlkPhos  60        PTT - ( 17 May 2019 10:18 )  PTT:58.5 sec    Mode: AC/ CMV (Assist Control/ Continuous Mandatory Ventilation)  RR (machine): 12  TV (machine): 550  FiO2: 50  PEEP: 5  ITime: 1  MAP: 8  PIP: 19      Daily     Daily Weight in k.4 (17 May 2019 00:00)      16 @ 07:  -   @ 07:00  --------------------------------------------------------  IN: 3969.4 mL / OUT: 2945 mL / NET: 1024.4 mL     @ 07:  -   @ 13:08  --------------------------------------------------------  IN: 1900.7 mL / OUT: 608 mL / NET: 1292.7 mL        Critically Ill patient  : [ ] preoperative ,   [ x] post operative    Requires :  [x Arterial Line   [x ] Central Line  [ ] PA catheter  [ ] IABP  [ ] ECMO  [ ] LVAD  [x ] Ventilator  [x ] pacemaker [ ] Impella.    Bedside evaluation , monitoring , treatment of hemodynamics , fluids , IVP/ IVCD meds.        Diagnosis:     POD / - CABG X 3 L    Post Op ARDS - resolving    Pulmonary Amyloidosis    Staph. Pneumonia / multifocal     respiratory failure     Requires chest PT, pulmonary toilet, ambu bagging, suctioning to maintain SaO2,  patent airway and treat atelectasis.     Ventilator Management:  [x ]AC-rest    [ ]CPAP-PS Wean    [ ]Trach Collar     [ ]Extubate    [ ] T-Piece  [ ]peep>5     Difficult weaning process - multiple organ system involvement in critically ill patient     CHF- acute [x ]   chronic [x ]    systolic [ ]   diatolic [ x]          - Echo- EF -   80%          [ ] RV dysfunction          - Cxr-cardiomegally, edema          - Clinical-  [x ]inotropes   [ x]pressors   [ ]diuresis   [ ]IABP   [ ]ECMO   [ ]LVAD   [ x]Respiratory Failure     Renal Failure - Acute Kidney Injury     CVVHD - even fluid balance    Hemodynamic lability,instability. Requires IVCD [x ] vasopressors [x ] inotropes  [ ] vasodilator  [x ]IVSS fluid  to maintain MAP, perfusion, C.I.     Thrombocytopenia     Hyperkalemia    SIRS / MODS     Tolerates NG / NJ feeds at [ ] goal rate    [ ] trophic rate    [ ]       rate                             -                     Discussed with CT surgeon, Physician's Assistant - Nurse Practitioner- Critical care medicine team.   Dicussed at  AM / PM rounds.   Chart, labs , films reviewed.    Total Time: 30 min suggested by Dr Genao. And JUANIS POEM for gastroparesis. Concerned that unlikely that G POEM would help her if jejunal dysfunction. Either way, not great timing to consider another major intervention as she has had so many, and is finally doing better. Therefore hold off on outside motility referral, and consider separate G and J w low profile tubes as ultimate goal, to allow better physical and social activity and function.    REC:  1) decrease tube flushing at night, to at least every 5 hours to avoid sleep deprivation, and disruption. Could extend to 6-7 hour interval at night, then resume q 3 hrs during awake hours.  2) At next GJ tube change, to be delayed until dysfunction of current tube, ask Dr Montoya to consider BOTOX plyorus, while changing to LOW PROFILE gj , eventually separate G and J lo profile.   3) Follow-up Dr Park in 2 months          Mandeep Park MD

## 2021-11-15 NOTE — LETTER
11/15/2021         RE: Dinora Mcghee  [address]  [facility] [address]        Dear Colleague,    Thank you for referring your patient, Dinora Mcghee, to the [facility] [place]. Please see a copy of my visit note below.    Spent 45 minutes reviewing current status. Generally, doing much better. No pain, able to tolerate only limited amounts of PO intake. Venting G tube about 3/week.  Waking every 3 hours to flush GJ, based on occlusion in past, and our recommendations. Leading to sleep deprivation. Most of visit exploring different issues w GJ tube. Pulling inwards w small bowel contractions, somewhat better. Mostly focused on interference of external GJ w exercise, intrusion into daily life and limits on activity, intimacy, sleep deprivation for discomfort from tube, flushing tube, and other aspects. Also general deconditioning after many months of malnutrition, and 4 interventions to ultimately achieve proper placement and retention of 57cm weighted GJ tube.     Discussed that other option is separate J tube for feeding, and G tube for venting. Could ultimately be low profile, which would benefit many QOL issues, physical, and social. Discussed that Dr Montoya is far and away the best expert at direct J placement and would ask him to perform such at next revision of GJ, which should be delayed to allow maximum recovery from multiple interventions. As pt making steady tho slow progress to better QOL and activity.    Also discussed options for whole gut motility testing, as suggested by Dr Genao. And G POEM for gastroparesis. Concerned that unlikely that G POEM would help her if jejunal dysfunction. Either way, not great timing to consider another major intervention as she has had so many, and is finally doing better. Therefore hold off on outside motility referral, and consider separate G and J w low profile tubes as ultimate goal, to allow better physical and  social activity and function.    REC:  1) decrease tube flushing at night, to at least every 5 hours to avoid sleep deprivation, and disruption. Could extend to 6-7 hour interval at night, then resume q 3 hrs during awake hours.  2) At next GJ tube change, to be delayed until dysfunction of current tube, ask Dr Montoya to consider BOTOX plyorus, while changing to LOW PROFILE gj , eventually separate G and J lo profile.   3) Follow-up Dr Park in 2 months        Again, thank you for allowing me to participate in the care of your patient.      Sincerely,    Mandeep Park MD

## 2021-11-15 NOTE — PROGRESS NOTES
Dinora is a 56 year old who is being evaluated via a billable video visit.      How would you like to obtain your AVS? MyChart  If the video visit is dropped, the invitation should be resent by: Send to e-mail at: [email or web address]  Will anyone else be joining your video visit? No    Video Start Time: 11:08 PM, end time 12:01PM    Video-Visit Details    Type of service:  Video Visit       Originating Location (pt. Location): Home    Distant Location (provider location):  [facility] [place]     Platform used for Video Visit: Emeli     Spent 45 minutes reviewing current status. Generally, doing much better. No pain, able to tolerate only limited amounts of PO intake. Venting G tube about 3/week.  Waking every 3 hours to flush GJ, based on occlusion in past, and our recommendations. Leading to sleep deprivation. Most of visit exploring different issues w GJ tube. Pulling inwards w small bowel contractions, somewhat better. Mostly focused on interference of external GJ w exercise, intrusion into daily life and limits on activity, intimacy, sleep deprivation for discomfort from tube, flushing tube, and other aspects. Also general deconditioning after many months of malnutrition, and 4 interventions to ultimately achieve proper placement and retention of 57cm weighted GJ tube.     Discussed that other option is separate J tube for feeding, and G tube for venting. Could ultimately be low profile, which would benefit many QOL issues, physical, and social. Discussed that Dr Montoya is far and away the best expert at direct J placement and would ask him to perform such at next revision of GJ, which should be delayed to allow maximum recovery from multiple interventions. As pt making steady tho slow progress to better QOL and activity.    Also discussed options for whole gut motility testing, as suggested by Dr Genao. And JUANIS ESPOSITO for gastroparesis. Concerned that unlikely that JUANIS ESPOSITO  would help her if jejunal dysfunction. Either way, not great timing to consider another major intervention as she has had so many, and is finally doing better. Therefore hold off on outside motility referral, and consider separate G and J w low profile tubes as ultimate goal, to allow better physical and social activity and function.    REC:  1) decrease tube flushing at night, to at least every 5 hours to avoid sleep deprivation, and disruption. Could extend to 6-7 hour interval at night, then resume q 3 hrs during awake hours.  2) At next GJ tube change, to be delayed until dysfunction of current tube, ask Dr Montoya to consider BOTOX plyorus, while changing to LOW PROFILE gj , eventually separate G and J lo profile.   3) Follow-up Dr Park in 2 months

## 2021-11-17 NOTE — PATIENT INSTRUCTIONS
Follow up:    Dr. Park has outlined the following steps after your recent clinic visit:      REC:  1) decrease tube flushing at night, to at least every 5 hours to avoid sleep deprivation, and disruption. Could extend to 6-7 hour interval at night, then resume q 3 hrs during awake hours.    2) At next GJ tube change, to be delayed until dysfunction of current tube, ask Dr Montoya to consider BOTOX plyorus, while changing to LOW PROFILE gj , eventually separate G and J lo profile.  Low profile tubes can be placed after the tract is in place for 3 months, likely in January 2022, keep in touch with us when you are ready to schedule this procedure.     3) Follow-up Dr Park in 2 months. Clinic has been scheduled, time/date visible in [employer]. Please let us know if this time/date does not work for you.       Please call with any questions or concerns regarding your clinic visit today.    It is a pleasure being involved in your health care.    Contacts post-consultation depending on your need:    Schedule Clinic Appointments            [phone] # 1   M-F 7:30 - 5 pm    Kenya Victor RN Care Coordinator  [phone]    Mansoor Dhillon LPN    [phone]     OR Procedure Scheduling                             [phone]    My Chart is available 24 hours a day and is a secure way to access your records and communicate with your care team.  I strongly recommend signing up if you haven't already done so, if you are comfortable with computers.  If you would like to inquire about this or are having problems with My Chart access, you may call [phone] or go online at [email or web address].  Please allow at least 24 hours for a response and extra time on weekends and Holidays.

## 2021-11-27 ENCOUNTER — ANESTHESIA EVENT (OUTPATIENT)
Dept: SURGERY | Facility: CLINIC | Age: 56
End: 2021-11-27
Payer: COMMERCIAL

## 2021-11-27 ENCOUNTER — APPOINTMENT (OUTPATIENT)
Dept: GENERAL RADIOLOGY | Facility: CLINIC | Age: 56
End: 2021-11-27
Payer: COMMERCIAL

## 2021-11-27 ENCOUNTER — HOSPITAL ENCOUNTER (OUTPATIENT)
Facility: CLINIC | Age: 56
Setting detail: OBSERVATION
Discharge: HOME | End: 2021-11-28
Attending: EMERGENCY MEDICINE | Admitting: NURSE PRACTITIONER
Payer: COMMERCIAL

## 2021-11-27 DIAGNOSIS — G89.18 ACUTE POST-OPERATIVE PAIN: Primary | ICD-10-CM

## 2021-11-27 DIAGNOSIS — Z20.822 COVID-19 RULED OUT BY LABORATORY TESTING: ICD-10-CM

## 2021-11-27 DIAGNOSIS — K94.23 GASTROSTOMY TUBE OBSTRUCTION (H): ICD-10-CM

## 2021-11-27 LAB
ALBUMIN SERPL-MCNC: 3.7 G/DL (ref 3.4–5)
ALP SERPL-CCNC: 92 U/L (ref 40–150)
ALT SERPL W P-5'-P-CCNC: 32 U/L (ref 0–50)
ANION GAP SERPL CALCULATED.3IONS-SCNC: 4 MMOL/L (ref 3–14)
AST SERPL W P-5'-P-CCNC: 21 U/L (ref 0–45)
BASOPHILS # BLD AUTO: 0.1 10E3/UL (ref 0–0.2)
BASOPHILS NFR BLD AUTO: 2 %
BILIRUB SERPL-MCNC: 0.6 MG/DL (ref 0.2–1.3)
BUN SERPL-MCNC: 12 MG/DL (ref 7–30)
CALCIUM SERPL-MCNC: 9.1 MG/DL (ref 8.5–10.1)
CHLORIDE BLD-SCNC: 106 MMOL/L (ref 94–109)
CO2 SERPL-SCNC: 29 MMOL/L (ref 20–32)
CREAT SERPL-MCNC: 0.68 MG/DL (ref 0.52–1.04)
EOSINOPHIL # BLD AUTO: 0.2 10E3/UL (ref 0–0.7)
EOSINOPHIL NFR BLD AUTO: 3 %
ERYTHROCYTE [DISTWIDTH] IN BLOOD BY AUTOMATED COUNT: 13.7 % (ref 10–15)
GFR SERPL CREATININE-BSD FRML MDRD: >90 ML/MIN/1.73M2
GLUCOSE BLD-MCNC: 88 MG/DL (ref 70–99)
HBA1C MFR BLD: 5.2 % (ref 0–5.6)
HCT VFR BLD AUTO: 41.3 % (ref 35–47)
HGB BLD-MCNC: 13.6 G/DL (ref 11.7–15.7)
HOLD SPECIMEN: NORMAL
IMM GRANULOCYTES # BLD: 0 10E3/UL
IMM GRANULOCYTES NFR BLD: 0 %
INR PPP: 1.04 (ref 0.85–1.15)
LYMPHOCYTES # BLD AUTO: 2.3 10E3/UL (ref 0.8–5.3)
LYMPHOCYTES NFR BLD AUTO: 41 %
MCH RBC QN AUTO: 32 PG (ref 26.5–33)
MCHC RBC AUTO-ENTMCNC: 32.9 G/DL (ref 31.5–36.5)
MCV RBC AUTO: 97 FL (ref 78–100)
MONOCYTES # BLD AUTO: 0.6 10E3/UL (ref 0–1.3)
MONOCYTES NFR BLD AUTO: 12 %
NEUTROPHILS # BLD AUTO: 2.4 10E3/UL (ref 1.6–8.3)
NEUTROPHILS NFR BLD AUTO: 42 %
NRBC # BLD AUTO: 0 10E3/UL
NRBC BLD AUTO-RTO: 0 /100
PLATELET # BLD AUTO: 398 10E3/UL (ref 150–450)
POTASSIUM BLD-SCNC: 3.9 MMOL/L (ref 3.4–5.3)
PROT SERPL-MCNC: 7.3 G/DL (ref 6.8–8.8)
RBC # BLD AUTO: 4.25 10E6/UL (ref 3.8–5.2)
SARS-COV-2 RNA RESP QL NAA+PROBE: NEGATIVE
SODIUM SERPL-SCNC: 139 MMOL/L (ref 133–144)
WBC # BLD AUTO: 5.5 10E3/UL (ref 4–11)

## 2021-11-27 PROCEDURE — C9113 INJ PANTOPRAZOLE SODIUM, VIA: HCPCS | Performed by: PHYSICIAN ASSISTANT

## 2021-11-27 PROCEDURE — 36415 COLL VENOUS BLD VENIPUNCTURE: CPT | Performed by: EMERGENCY MEDICINE

## 2021-11-27 PROCEDURE — 85610 PROTHROMBIN TIME: CPT | Performed by: INTERNAL MEDICINE

## 2021-11-27 PROCEDURE — 99285 EMERGENCY DEPT VISIT HI MDM: CPT | Mod: 25 | Performed by: EMERGENCY MEDICINE

## 2021-11-27 PROCEDURE — 80053 COMPREHEN METABOLIC PANEL: CPT | Performed by: EMERGENCY MEDICINE

## 2021-11-27 PROCEDURE — U0003 INFECTIOUS AGENT DETECTION BY NUCLEIC ACID (DNA OR RNA); SEVERE ACUTE RESPIRATORY SYNDROME CORONAVIRUS 2 (SARS-COV-2) (CORONAVIRUS DISEASE [COVID-19]), AMPLIFIED PROBE TECHNIQUE, MAKING USE OF HIGH THROUGHPUT TECHNOLOGIES AS DESCRIBED BY CMS-2020-01-R: HCPCS | Performed by: EMERGENCY MEDICINE

## 2021-11-27 PROCEDURE — 99220 PR INITIAL OBSERVATION CARE,LEVEL III: CPT | Performed by: PHYSICIAN ASSISTANT

## 2021-11-27 PROCEDURE — 250N000011 HC RX IP 250 OP 636: Performed by: PHYSICIAN ASSISTANT

## 2021-11-27 PROCEDURE — 250N000013 HC RX MED GY IP 250 OP 250 PS 637: Performed by: EMERGENCY MEDICINE

## 2021-11-27 PROCEDURE — G0378 HOSPITAL OBSERVATION PER HR: HCPCS

## 2021-11-27 PROCEDURE — 99284 EMERGENCY DEPT VISIT MOD MDM: CPT | Performed by: EMERGENCY MEDICINE

## 2021-11-27 PROCEDURE — 96375 TX/PRO/DX INJ NEW DRUG ADDON: CPT

## 2021-11-27 PROCEDURE — 85018 HEMOGLOBIN: CPT | Performed by: EMERGENCY MEDICINE

## 2021-11-27 PROCEDURE — 96361 HYDRATE IV INFUSION ADD-ON: CPT

## 2021-11-27 PROCEDURE — 83036 HEMOGLOBIN GLYCOSYLATED A1C: CPT | Performed by: PHYSICIAN ASSISTANT

## 2021-11-27 PROCEDURE — 96374 THER/PROPH/DIAG INJ IV PUSH: CPT

## 2021-11-27 PROCEDURE — C9803 HOPD COVID-19 SPEC COLLECT: HCPCS | Performed by: EMERGENCY MEDICINE

## 2021-11-27 PROCEDURE — 258N000003 HC RX IP 258 OP 636: Performed by: EMERGENCY MEDICINE

## 2021-11-27 PROCEDURE — 258N000003 HC RX IP 258 OP 636: Performed by: PHYSICIAN ASSISTANT

## 2021-11-27 PROCEDURE — 74018 RADEX ABDOMEN 1 VIEW: CPT

## 2021-11-27 PROCEDURE — 74018 RADEX ABDOMEN 1 VIEW: CPT | Mod: 26 | Performed by: RADIOLOGY

## 2021-11-27 RX ORDER — AMITRIPTYLINE HYDROCHLORIDE 10 MG/1
10 TABLET ORAL AT BEDTIME
Status: DISCONTINUED | OUTPATIENT
Start: 2021-11-27 | End: 2021-11-27

## 2021-11-27 RX ORDER — POLYETHYLENE GLYCOL 3350 17 G/17G
17 POWDER, FOR SOLUTION ORAL EVERY MORNING
Status: DISCONTINUED | OUTPATIENT
Start: 2021-11-28 | End: 2021-11-28

## 2021-11-27 RX ORDER — LEVOTHYROXINE SODIUM 137 UG/1
137 TABLET ORAL DAILY
Status: DISCONTINUED | OUTPATIENT
Start: 2021-11-28 | End: 2021-11-28 | Stop reason: HOSPADM

## 2021-11-27 RX ORDER — CYCLOBENZAPRINE HCL 5 MG
10 TABLET ORAL 2 TIMES DAILY PRN
Status: DISCONTINUED | OUTPATIENT
Start: 2021-11-27 | End: 2021-11-28 | Stop reason: HOSPADM

## 2021-11-27 RX ORDER — ONDANSETRON 2 MG/ML
4 INJECTION INTRAMUSCULAR; INTRAVENOUS EVERY 6 HOURS PRN
Status: DISCONTINUED | OUTPATIENT
Start: 2021-11-27 | End: 2021-11-27

## 2021-11-27 RX ORDER — PROCHLORPERAZINE 25 MG
25 SUPPOSITORY, RECTAL RECTAL EVERY 12 HOURS PRN
Status: DISCONTINUED | OUTPATIENT
Start: 2021-11-27 | End: 2021-11-28 | Stop reason: HOSPADM

## 2021-11-27 RX ORDER — DIPHENHYDRAMINE HYDROCHLORIDE 50 MG/ML
25 INJECTION INTRAMUSCULAR; INTRAVENOUS EVERY 6 HOURS PRN
Status: DISCONTINUED | OUTPATIENT
Start: 2021-11-27 | End: 2021-11-28 | Stop reason: HOSPADM

## 2021-11-27 RX ORDER — DEXTROSE MONOHYDRATE 25 G/50ML
25-50 INJECTION, SOLUTION INTRAVENOUS
Status: DISCONTINUED | OUTPATIENT
Start: 2021-11-27 | End: 2021-11-28 | Stop reason: HOSPADM

## 2021-11-27 RX ORDER — DIPHENHYDRAMINE HCL 25 MG
25 CAPSULE ORAL EVERY 6 HOURS PRN
Status: DISCONTINUED | OUTPATIENT
Start: 2021-11-27 | End: 2021-11-27

## 2021-11-27 RX ORDER — IBUPROFEN 200 MG
400 TABLET ORAL EVERY 6 HOURS PRN
Status: DISCONTINUED | OUTPATIENT
Start: 2021-11-27 | End: 2021-11-28 | Stop reason: HOSPADM

## 2021-11-27 RX ORDER — PROCHLORPERAZINE MALEATE 10 MG
10 TABLET ORAL EVERY 6 HOURS PRN
Status: DISCONTINUED | OUTPATIENT
Start: 2021-11-27 | End: 2021-11-28 | Stop reason: HOSPADM

## 2021-11-27 RX ORDER — SCOLOPAMINE TRANSDERMAL SYSTEM 1 MG/1
1 PATCH, EXTENDED RELEASE TRANSDERMAL
Status: DISCONTINUED | OUTPATIENT
Start: 2021-11-28 | End: 2021-11-28 | Stop reason: HOSPADM

## 2021-11-27 RX ORDER — NICOTINE POLACRILEX 4 MG
15-30 LOZENGE BUCCAL
Status: DISCONTINUED | OUTPATIENT
Start: 2021-11-27 | End: 2021-11-28 | Stop reason: HOSPADM

## 2021-11-27 RX ORDER — AMOXICILLIN 250 MG
2 CAPSULE ORAL EVERY MORNING
Status: DISCONTINUED | OUTPATIENT
Start: 2021-11-28 | End: 2021-11-28 | Stop reason: HOSPADM

## 2021-11-27 RX ORDER — SODIUM CHLORIDE, SODIUM LACTATE, POTASSIUM CHLORIDE, CALCIUM CHLORIDE 600; 310; 30; 20 MG/100ML; MG/100ML; MG/100ML; MG/100ML
INJECTION, SOLUTION INTRAVENOUS CONTINUOUS
Status: DISCONTINUED | OUTPATIENT
Start: 2021-11-27 | End: 2021-11-28 | Stop reason: HOSPADM

## 2021-11-27 RX ORDER — DIPHENHYDRAMINE HCL 25 MG
25 CAPSULE ORAL EVERY 6 HOURS PRN
Status: DISCONTINUED | OUTPATIENT
Start: 2021-11-27 | End: 2021-11-28 | Stop reason: HOSPADM

## 2021-11-27 RX ORDER — ACETAMINOPHEN 500 MG
1000 TABLET ORAL EVERY 8 HOURS PRN
Status: DISCONTINUED | OUTPATIENT
Start: 2021-11-27 | End: 2021-11-28 | Stop reason: HOSPADM

## 2021-11-27 RX ORDER — ZOLMITRIPTAN 5 MG/1
5 TABLET, FILM COATED ORAL
Status: DISCONTINUED | OUTPATIENT
Start: 2021-11-27 | End: 2021-11-28 | Stop reason: HOSPADM

## 2021-11-27 RX ORDER — AMITRIPTYLINE HYDROCHLORIDE 50 MG/1
50 TABLET ORAL AT BEDTIME
Status: DISCONTINUED | OUTPATIENT
Start: 2021-11-27 | End: 2021-11-27

## 2021-11-27 RX ORDER — ONDANSETRON 4 MG/1
4 TABLET, ORALLY DISINTEGRATING ORAL EVERY 6 HOURS PRN
Status: DISCONTINUED | OUTPATIENT
Start: 2021-11-27 | End: 2021-11-27

## 2021-11-27 RX ADMIN — AMITRIPTYLINE HYDROCHLORIDE 60 MG: 10 TABLET, FILM COATED ORAL at 22:54

## 2021-11-27 RX ADMIN — SODIUM CHLORIDE 500 ML: 9 INJECTION, SOLUTION INTRAVENOUS at 21:48

## 2021-11-27 RX ADMIN — FAMOTIDINE 20 MG: 20 INJECTION, SOLUTION INTRAVENOUS at 23:32

## 2021-11-27 RX ADMIN — PROMETHAZINE HYDROCHLORIDE 12.5 MG: 25 INJECTION INTRAMUSCULAR; INTRAVENOUS at 22:50

## 2021-11-27 RX ADMIN — DIPHENHYDRAMINE HYDROCHLORIDE 25 MG: 50 INJECTION, SOLUTION INTRAMUSCULAR; INTRAVENOUS at 21:43

## 2021-11-27 RX ADMIN — SODIUM CHLORIDE, POTASSIUM CHLORIDE, SODIUM LACTATE AND CALCIUM CHLORIDE: 600; 310; 30; 20 INJECTION, SOLUTION INTRAVENOUS at 23:31

## 2021-11-27 RX ADMIN — PANTOPRAZOLE SODIUM 40 MG: 40 INJECTION, POWDER, FOR SOLUTION INTRAVENOUS at 21:46

## 2021-11-27 ASSESSMENT — ENCOUNTER SYMPTOMS: SEIZURES: 0

## 2021-11-27 ASSESSMENT — LIFESTYLE VARIABLES: TOBACCO_USE: 1

## 2021-11-28 ENCOUNTER — APPOINTMENT (OUTPATIENT)
Dept: GENERAL RADIOLOGY | Facility: CLINIC | Age: 56
End: 2021-11-28
Attending: INTERNAL MEDICINE
Payer: COMMERCIAL

## 2021-11-28 ENCOUNTER — ANESTHESIA (OUTPATIENT)
Dept: SURGERY | Facility: CLINIC | Age: 56
End: 2021-11-28
Payer: COMMERCIAL

## 2021-11-28 VITALS
SYSTOLIC BLOOD PRESSURE: 115 MMHG | HEIGHT: 67 IN | DIASTOLIC BLOOD PRESSURE: 80 MMHG | WEIGHT: 133 LBS | HEART RATE: 76 BPM | TEMPERATURE: 97.6 F | BODY MASS INDEX: 20.88 KG/M2 | OXYGEN SATURATION: 97 % | RESPIRATION RATE: 18 BRPM

## 2021-11-28 LAB
ALBUMIN SERPL-MCNC: 2.7 G/DL (ref 3.4–5)
ALP SERPL-CCNC: 68 U/L (ref 40–150)
ALT SERPL W P-5'-P-CCNC: 28 U/L (ref 0–50)
ANION GAP SERPL CALCULATED.3IONS-SCNC: 3 MMOL/L (ref 3–14)
AST SERPL W P-5'-P-CCNC: 26 U/L (ref 0–45)
BASOPHILS # BLD AUTO: 0.1 10E3/UL (ref 0–0.2)
BASOPHILS NFR BLD AUTO: 2 %
BILIRUB SERPL-MCNC: 1 MG/DL (ref 0.2–1.3)
BUN SERPL-MCNC: 11 MG/DL (ref 7–30)
CALCIUM SERPL-MCNC: 8.4 MG/DL (ref 8.5–10.1)
CHLORIDE BLD-SCNC: 109 MMOL/L (ref 94–109)
CO2 SERPL-SCNC: 29 MMOL/L (ref 20–32)
CREAT SERPL-MCNC: 0.67 MG/DL (ref 0.52–1.04)
EOSINOPHIL # BLD AUTO: 0.2 10E3/UL (ref 0–0.7)
EOSINOPHIL NFR BLD AUTO: 4 %
ERYTHROCYTE [DISTWIDTH] IN BLOOD BY AUTOMATED COUNT: 13.8 % (ref 10–15)
GFR SERPL CREATININE-BSD FRML MDRD: >90 ML/MIN/1.73M2
GLUCOSE BLD-MCNC: 104 MG/DL (ref 70–99)
GLUCOSE BLDC GLUCOMTR-MCNC: 102 MG/DL (ref 70–99)
GLUCOSE BLDC GLUCOMTR-MCNC: 103 MG/DL (ref 70–99)
GLUCOSE BLDC GLUCOMTR-MCNC: 76 MG/DL (ref 70–99)
GLUCOSE BLDC GLUCOMTR-MCNC: 95 MG/DL (ref 70–99)
HCT VFR BLD AUTO: 35.3 % (ref 35–47)
HGB BLD-MCNC: 12.3 G/DL (ref 11.7–15.7)
IMM GRANULOCYTES # BLD: 0 10E3/UL
IMM GRANULOCYTES NFR BLD: 0 %
INR PPP: 1.14 (ref 0.85–1.15)
LYMPHOCYTES # BLD AUTO: 1.8 10E3/UL (ref 0.8–5.3)
LYMPHOCYTES NFR BLD AUTO: 41 %
MAGNESIUM SERPL-MCNC: 2.1 MG/DL (ref 1.6–2.3)
MCH RBC QN AUTO: 34.2 PG (ref 26.5–33)
MCHC RBC AUTO-ENTMCNC: 34.8 G/DL (ref 31.5–36.5)
MCV RBC AUTO: 98 FL (ref 78–100)
MONOCYTES # BLD AUTO: 0.8 10E3/UL (ref 0–1.3)
MONOCYTES NFR BLD AUTO: 17 %
NEUTROPHILS # BLD AUTO: 1.6 10E3/UL (ref 1.6–8.3)
NEUTROPHILS NFR BLD AUTO: 36 %
NRBC # BLD AUTO: 0 10E3/UL
NRBC BLD AUTO-RTO: 0 /100
PHOSPHATE SERPL-MCNC: 3.3 MG/DL (ref 2.5–4.5)
PLATELET # BLD AUTO: 375 10E3/UL (ref 150–450)
POTASSIUM BLD-SCNC: 3.8 MMOL/L (ref 3.4–5.3)
PROT SERPL-MCNC: 5.8 G/DL (ref 6.8–8.8)
RBC # BLD AUTO: 3.6 10E6/UL (ref 3.8–5.2)
SODIUM SERPL-SCNC: 141 MMOL/L (ref 133–144)
UPPER GI ENDOSCOPY: NORMAL
WBC # BLD AUTO: 4.4 10E3/UL (ref 4–11)

## 2021-11-28 PROCEDURE — 999N000141 HC STATISTIC PRE-PROCEDURE NURSING ASSESSMENT: Performed by: INTERNAL MEDICINE

## 2021-11-28 PROCEDURE — 82040 ASSAY OF SERUM ALBUMIN: CPT | Performed by: PHYSICIAN ASSISTANT

## 2021-11-28 PROCEDURE — 250N000011 HC RX IP 250 OP 636: Performed by: NURSE ANESTHETIST, CERTIFIED REGISTERED

## 2021-11-28 PROCEDURE — 80053 COMPREHEN METABOLIC PANEL: CPT | Performed by: PHYSICIAN ASSISTANT

## 2021-11-28 PROCEDURE — 250N000013 HC RX MED GY IP 250 OP 250 PS 637: Performed by: NURSE PRACTITIONER

## 2021-11-28 PROCEDURE — 82962 GLUCOSE BLOOD TEST: CPT

## 2021-11-28 PROCEDURE — 250N000011 HC RX IP 250 OP 636: Performed by: ANESTHESIOLOGY

## 2021-11-28 PROCEDURE — 258N000003 HC RX IP 258 OP 636: Performed by: INTERNAL MEDICINE

## 2021-11-28 PROCEDURE — 360N000082 HC SURGERY LEVEL 2 W/ FLUORO, PER MIN: Performed by: INTERNAL MEDICINE

## 2021-11-28 PROCEDURE — 96375 TX/PRO/DX INJ NEW DRUG ADDON: CPT | Mod: 59

## 2021-11-28 PROCEDURE — 83735 ASSAY OF MAGNESIUM: CPT | Performed by: PHYSICIAN ASSISTANT

## 2021-11-28 PROCEDURE — 85025 COMPLETE CBC W/AUTO DIFF WBC: CPT | Performed by: PHYSICIAN ASSISTANT

## 2021-11-28 PROCEDURE — 250N000009 HC RX 250: Performed by: NURSE ANESTHETIST, CERTIFIED REGISTERED

## 2021-11-28 PROCEDURE — 99222 1ST HOSP IP/OBS MODERATE 55: CPT | Mod: GC | Performed by: INTERNAL MEDICINE

## 2021-11-28 PROCEDURE — 250N000009 HC RX 250: Performed by: PHYSICIAN ASSISTANT

## 2021-11-28 PROCEDURE — 250N000011 HC RX IP 250 OP 636: Performed by: NURSE PRACTITIONER

## 2021-11-28 PROCEDURE — 85610 PROTHROMBIN TIME: CPT | Performed by: PHYSICIAN ASSISTANT

## 2021-11-28 PROCEDURE — 710N000010 HC RECOVERY PHASE 1, LEVEL 2, PER MIN: Performed by: INTERNAL MEDICINE

## 2021-11-28 PROCEDURE — 258N000003 HC RX IP 258 OP 636: Performed by: ANESTHESIOLOGY

## 2021-11-28 PROCEDURE — C1894 INTRO/SHEATH, NON-LASER: HCPCS | Performed by: INTERNAL MEDICINE

## 2021-11-28 PROCEDURE — 272N000002 HC OR SUPPLY OTHER OPNP: Performed by: INTERNAL MEDICINE

## 2021-11-28 PROCEDURE — 250N000011 HC RX IP 250 OP 636: Performed by: PHYSICIAN ASSISTANT

## 2021-11-28 PROCEDURE — 999N000179 XR SURGERY CARM FLUORO LESS THAN 5 MIN W STILLS: Mod: TC

## 2021-11-28 PROCEDURE — 272N000001 HC OR GENERAL SUPPLY STERILE: Performed by: INTERNAL MEDICINE

## 2021-11-28 PROCEDURE — 250N000009 HC RX 250: Performed by: INTERNAL MEDICINE

## 2021-11-28 PROCEDURE — 370N000017 HC ANESTHESIA TECHNICAL FEE, PER MIN: Performed by: INTERNAL MEDICINE

## 2021-11-28 PROCEDURE — G0378 HOSPITAL OBSERVATION PER HR: HCPCS

## 2021-11-28 PROCEDURE — 250N000013 HC RX MED GY IP 250 OP 250 PS 637: Performed by: EMERGENCY MEDICINE

## 2021-11-28 PROCEDURE — 84100 ASSAY OF PHOSPHORUS: CPT | Performed by: PHYSICIAN ASSISTANT

## 2021-11-28 PROCEDURE — 99217 PR OBSERVATION CARE DISCHARGE: CPT | Performed by: NURSE PRACTITIONER

## 2021-11-28 PROCEDURE — 250N000013 HC RX MED GY IP 250 OP 250 PS 637: Performed by: PHYSICIAN ASSISTANT

## 2021-11-28 PROCEDURE — 36415 COLL VENOUS BLD VENIPUNCTURE: CPT | Performed by: PHYSICIAN ASSISTANT

## 2021-11-28 RX ORDER — POLYETHYLENE GLYCOL 3350 17 G/17G
17 POWDER, FOR SOLUTION ORAL
Status: DISCONTINUED | OUTPATIENT
Start: 2021-11-28 | End: 2021-11-28 | Stop reason: HOSPADM

## 2021-11-28 RX ORDER — LIDOCAINE HYDROCHLORIDE 20 MG/ML
INJECTION, SOLUTION INFILTRATION; PERINEURAL PRN
Status: DISCONTINUED | OUTPATIENT
Start: 2021-11-28 | End: 2021-11-28

## 2021-11-28 RX ORDER — NALOXONE HYDROCHLORIDE 0.4 MG/ML
0.2 INJECTION, SOLUTION INTRAMUSCULAR; INTRAVENOUS; SUBCUTANEOUS
Status: DISCONTINUED | OUTPATIENT
Start: 2021-11-28 | End: 2021-11-28 | Stop reason: HOSPADM

## 2021-11-28 RX ORDER — CEFAZOLIN SODIUM 1 G/3ML
INJECTION, POWDER, FOR SOLUTION INTRAMUSCULAR; INTRAVENOUS PRN
Status: DISCONTINUED | OUTPATIENT
Start: 2021-11-28 | End: 2021-11-28

## 2021-11-28 RX ORDER — NALOXONE HYDROCHLORIDE 0.4 MG/ML
0.4 INJECTION, SOLUTION INTRAMUSCULAR; INTRAVENOUS; SUBCUTANEOUS
Status: DISCONTINUED | OUTPATIENT
Start: 2021-11-28 | End: 2021-11-28 | Stop reason: HOSPADM

## 2021-11-28 RX ORDER — SODIUM CHLORIDE, SODIUM LACTATE, POTASSIUM CHLORIDE, CALCIUM CHLORIDE 600; 310; 30; 20 MG/100ML; MG/100ML; MG/100ML; MG/100ML
INJECTION, SOLUTION INTRAVENOUS CONTINUOUS
Status: DISCONTINUED | OUTPATIENT
Start: 2021-11-28 | End: 2021-11-28

## 2021-11-28 RX ORDER — FENTANYL CITRATE 50 UG/ML
INJECTION, SOLUTION INTRAMUSCULAR; INTRAVENOUS PRN
Status: DISCONTINUED | OUTPATIENT
Start: 2021-11-28 | End: 2021-11-28

## 2021-11-28 RX ORDER — HYDROMORPHONE HYDROCHLORIDE 1 MG/ML
0.2 INJECTION, SOLUTION INTRAMUSCULAR; INTRAVENOUS; SUBCUTANEOUS EVERY 5 MIN PRN
Status: DISCONTINUED | OUTPATIENT
Start: 2021-11-28 | End: 2021-11-28 | Stop reason: HOSPADM

## 2021-11-28 RX ORDER — ONDANSETRON 2 MG/ML
4 INJECTION INTRAMUSCULAR; INTRAVENOUS EVERY 30 MIN PRN
Status: DISCONTINUED | OUTPATIENT
Start: 2021-11-28 | End: 2021-11-28 | Stop reason: HOSPADM

## 2021-11-28 RX ORDER — PROPOFOL 10 MG/ML
INJECTION, EMULSION INTRAVENOUS PRN
Status: DISCONTINUED | OUTPATIENT
Start: 2021-11-28 | End: 2021-11-28

## 2021-11-28 RX ORDER — ONDANSETRON 4 MG/1
4 TABLET, ORALLY DISINTEGRATING ORAL EVERY 30 MIN PRN
Status: DISCONTINUED | OUTPATIENT
Start: 2021-11-28 | End: 2021-11-28 | Stop reason: HOSPADM

## 2021-11-28 RX ORDER — SODIUM CHLORIDE, SODIUM LACTATE, POTASSIUM CHLORIDE, CALCIUM CHLORIDE 600; 310; 30; 20 MG/100ML; MG/100ML; MG/100ML; MG/100ML
INJECTION, SOLUTION INTRAVENOUS CONTINUOUS
Status: DISCONTINUED | OUTPATIENT
Start: 2021-11-28 | End: 2021-11-28 | Stop reason: HOSPADM

## 2021-11-28 RX ORDER — FENTANYL CITRATE 50 UG/ML
25 INJECTION, SOLUTION INTRAMUSCULAR; INTRAVENOUS EVERY 5 MIN PRN
Status: DISCONTINUED | OUTPATIENT
Start: 2021-11-28 | End: 2021-11-28 | Stop reason: HOSPADM

## 2021-11-28 RX ORDER — HYDROMORPHONE HYDROCHLORIDE 1 MG/ML
0.5 INJECTION, SOLUTION INTRAMUSCULAR; INTRAVENOUS; SUBCUTANEOUS ONCE
Status: COMPLETED | OUTPATIENT
Start: 2021-11-28 | End: 2021-11-28

## 2021-11-28 RX ORDER — PROPOFOL 10 MG/ML
INJECTION, EMULSION INTRAVENOUS CONTINUOUS PRN
Status: DISCONTINUED | OUTPATIENT
Start: 2021-11-28 | End: 2021-11-28

## 2021-11-28 RX ORDER — LIDOCAINE 40 MG/G
CREAM TOPICAL
Status: DISCONTINUED | OUTPATIENT
Start: 2021-11-28 | End: 2021-11-28 | Stop reason: HOSPADM

## 2021-11-28 RX ORDER — HYDROMORPHONE HYDROCHLORIDE 1 MG/ML
1-2 SOLUTION ORAL EVERY 4 HOURS PRN
Status: DISCONTINUED | OUTPATIENT
Start: 2021-11-28 | End: 2021-11-28 | Stop reason: HOSPADM

## 2021-11-28 RX ORDER — HYDROMORPHONE HYDROCHLORIDE 1 MG/ML
1-2 SOLUTION ORAL EVERY 4 HOURS PRN
Qty: 168 ML | Refills: 0 | Status: ON HOLD | OUTPATIENT
Start: 2021-11-28 | End: 2023-01-08

## 2021-11-28 RX ADMIN — SODIUM CHLORIDE, POTASSIUM CHLORIDE, SODIUM LACTATE AND CALCIUM CHLORIDE: 600; 310; 30; 20 INJECTION, SOLUTION INTRAVENOUS at 14:25

## 2021-11-28 RX ADMIN — FENTANYL CITRATE 25 MCG: 50 INJECTION INTRAMUSCULAR; INTRAVENOUS at 10:33

## 2021-11-28 RX ADMIN — FENTANYL CITRATE 25 MCG: 50 INJECTION INTRAMUSCULAR; INTRAVENOUS at 11:42

## 2021-11-28 RX ADMIN — SUGAMMADEX 130 MG: 100 INJECTION, SOLUTION INTRAVENOUS at 09:52

## 2021-11-28 RX ADMIN — PROPOFOL 30 MG: 10 INJECTION, EMULSION INTRAVENOUS at 09:10

## 2021-11-28 RX ADMIN — FENTANYL CITRATE 25 MCG: 50 INJECTION INTRAMUSCULAR; INTRAVENOUS at 11:09

## 2021-11-28 RX ADMIN — FENTANYL CITRATE 50 MCG: 50 INJECTION, SOLUTION INTRAMUSCULAR; INTRAVENOUS at 09:07

## 2021-11-28 RX ADMIN — HYDROMORPHONE HYDROCHLORIDE 0.5 MG: 1 INJECTION, SOLUTION INTRAMUSCULAR; INTRAVENOUS; SUBCUTANEOUS at 13:01

## 2021-11-28 RX ADMIN — MIDAZOLAM 2 MG: 1 INJECTION INTRAMUSCULAR; INTRAVENOUS at 08:37

## 2021-11-28 RX ADMIN — DOCUSATE SODIUM 50 MG AND SENNOSIDES 8.6 MG 2 TABLET: 8.6; 5 TABLET, FILM COATED ORAL at 12:40

## 2021-11-28 RX ADMIN — PROCHLORPERAZINE MALEATE 10 MG: 10 TABLET ORAL at 16:36

## 2021-11-28 RX ADMIN — POLYETHYLENE GLYCOL 3350 17 G: 17 POWDER, FOR SOLUTION ORAL at 12:40

## 2021-11-28 RX ADMIN — LIDOCAINE HYDROCHLORIDE 40 MG: 20 INJECTION, SOLUTION INFILTRATION; PERINEURAL at 08:45

## 2021-11-28 RX ADMIN — ROCURONIUM BROMIDE 40 MG: 50 INJECTION, SOLUTION INTRAVENOUS at 08:45

## 2021-11-28 RX ADMIN — PROPOFOL 150 MCG/KG/MIN: 10 INJECTION, EMULSION INTRAVENOUS at 08:45

## 2021-11-28 RX ADMIN — FENTANYL CITRATE 25 MCG: 50 INJECTION INTRAMUSCULAR; INTRAVENOUS at 11:22

## 2021-11-28 RX ADMIN — SODIUM CHLORIDE, POTASSIUM CHLORIDE, SODIUM LACTATE AND CALCIUM CHLORIDE: 600; 310; 30; 20 INJECTION, SOLUTION INTRAVENOUS at 08:37

## 2021-11-28 RX ADMIN — DIPHENHYDRAMINE HYDROCHLORIDE 25 MG: 25 CAPSULE ORAL at 16:36

## 2021-11-28 RX ADMIN — PROCHLORPERAZINE EDISYLATE 10 MG: 5 INJECTION INTRAMUSCULAR; INTRAVENOUS at 10:10

## 2021-11-28 RX ADMIN — CEFAZOLIN 2 G: 1 INJECTION, POWDER, FOR SOLUTION INTRAMUSCULAR; INTRAVENOUS at 08:49

## 2021-11-28 RX ADMIN — PROCHLORPERAZINE EDISYLATE 10 MG: 5 INJECTION INTRAMUSCULAR; INTRAVENOUS at 04:22

## 2021-11-28 RX ADMIN — FENTANYL CITRATE 25 MCG: 50 INJECTION INTRAMUSCULAR; INTRAVENOUS at 10:27

## 2021-11-28 RX ADMIN — PROPOFOL 100 MG: 10 INJECTION, EMULSION INTRAVENOUS at 08:45

## 2021-11-28 RX ADMIN — HYDROMORPHONE HYDROCHLORIDE 2 MG: 1 SOLUTION ORAL at 16:08

## 2021-11-28 RX ADMIN — LEVOTHYROXINE SODIUM 137 MCG: 0.14 TABLET ORAL at 12:40

## 2021-11-28 RX ADMIN — FENTANYL CITRATE 25 MCG: 50 INJECTION INTRAMUSCULAR; INTRAVENOUS at 10:59

## 2021-11-28 RX ADMIN — ACETAMINOPHEN 1000 MG: 500 TABLET, FILM COATED ORAL at 04:21

## 2021-11-28 RX ADMIN — SCOPALAMINE 1 PATCH: 1 PATCH, EXTENDED RELEASE TRANSDERMAL at 08:43

## 2021-11-28 ASSESSMENT — ENCOUNTER SYMPTOMS
SHORTNESS OF BREATH: 0
ADENOPATHY: 0
LIGHT-HEADEDNESS: 0
BACK PAIN: 0
COUGH: 0
ARTHRALGIAS: 0
FEVER: 0
DYSPHORIC MOOD: 0
DYSURIA: 0
VOMITING: 0
ABDOMINAL PAIN: 1
CHILLS: 0
NAUSEA: 0

## 2021-11-28 ASSESSMENT — MIFFLIN-ST. JEOR: SCORE: 1230.91

## 2021-11-28 NOTE — CONSULTS
Gastroenterology Consultation  Dinora Mcghee [id number] 56 year old 1965  11/27/2021        Date of Admission: 11/27/2021  Requesting physician: Starla Birmingham, *       Reason for Consultation:   GJ tube clogged         Assessment and Plan:   Dinora Mcghee is a 56 year old female with PMHx of TPAIT w/choledochoduodenostomy, duodenojejunostomy, Vera-Y reconstruction (12/2016). PMHx includes suspected gastroparesis and hypoglycemia, managed by a G-J tube placement, hypothyroidism and history of pituitary adenoma s/p resection who presented with GJ tube clogging.     #. GJ Tube Dysfunction  #. Suspected Gastroparesis  #. Severe protein calorie malnutrition  Chronic issues with gastroparesis requiring a G-J tube - initially placed on 9/7/21. Complicated by occlusions requiring revisions x 3, last of which was 10/19/21. Presented with occlusion on 11/27; no significant electrolyte abnormalities.     Followed by Dr. Park in clinic - last seen 11/15/21. Discussed at that time the plan for potential separate low profile G and J tubes to allow for better physical & social activity as well as function. G POEM not thought to be an option given concern for jejunal dysfunction.     #. Chronic Constipation: PTA on Motegrity 2 mg daily, Senna-colace 2 tabs BID and miralax          Recommendations:   -- NPO except medications  -- Plan for direct J placement on 11/28 in the OR w/Dr. Montoya   -- Supportive cares, including IV fluids, per primary team  -- Recommend nutrition consultation  -- Continue bowel regimen of: Motegrity 2 mg daily, Senna-colace 2 tabs BID and miralax TID    It has been a pleasure to participate in the care of this patient.  Patient discussed with GI staff, Dr. Montoya.  Please do not hesitate to contact the GI service with any questions or concerns.     Raeann Gasca (Lizzie)  Gastroenterology Fellow  Pager [phone]         HPI:   Dinora Mcghee is a 56 year old female with PMHx of  "TPAIT w/choledochoduodenostomy, duodenojejunostomy, Vera-Y reconstruction (12/2016). PMHx includes suspected gastroparesis and hypoglycemia, managed by a G-J tube placement, hypothyroidism and history of pituitary adenoma s/p resection who presented with GJ tube clogging.     Her GJ has been having issues for several days prior to admission, but stopped working around 2pm on the day of admission. She tried all measures she had been taught without success. In addition, she mentions LUQ discomfort, inferior to her ribs. Chronic nausea, denies any emesis. Denies any fevers or chills. She takes minimal PO intake.     In terms of her bowel habits, she has a regimen of Motegrity, senna-colace and miralax, which generally works well for her in order to have 2-3 bowel movements per week. If she goes for several days without a BM, she will take magnesium citrate. She thinks that her symptoms are better on the days when she doesn't have any bowel movements. She has a home health nurse that comes to her home and has noted \"no bowel sounds\" intermittently. She doesn't think in the week leading up to her admission that her constipation has worsened.           Past Medical History:   Reviewed and edited as appropriate  Past Medical History:   Diagnosis Date     Allergic rhinitis, cause unspecified      Allergy to other foods     strawberries, apples, celeries, alice, watermelon     Arthritis     left knee     Choledocholithiasis     long after cholecystectomy     Chronic abdominal pain      Chronic constipation      Chronic nausea      Chronic pancreatitis (H)      Degeneration of lumbar or lumbosacral intervertebral disc      Esophageal reflux     w/ hiatal hernia     Gastroparesis      Hiatal hernia      History of pituitary adenoma     s/p resection     Hypothyroidism      Migraines      Mild hyperlipidemia      On tube feeding diet     presence of GJ tube     Pancreatic disease     PD stricture, 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 endometriosis growth     APPENDECTOMY       C  DELIVERY ONLY       C  DELIVERY ONLY      repeat c section with incidental cystotomy with repair     C EXCIS PITUITARY,TRANSNASAL/SEPTAL  1980    pituitary tumor removed for diabetes insipidus     C TOTAL ABDOM HYSTERECTOMY      w/ bilateral salpingoophorectomy       SECTION       COLONOSCOPY       ENDOSCOPIC RETROGRADE CHOLANGIOPANCREATOGRAM N/A 2015    Procedure: ENDOSCOPIC RETROGRADE CHOLANGIOPANCREATOGRAM;  Surgeon: Mandeep Park MD;  Location: [facility]     ENDOSCOPIC RETROGRADE CHOLANGIOPANCREATOGRAM N/A 2016    Procedure: COMBINED ENDOSCOPIC RETROGRADE CHOLANGIOPANCREATOGRAPHY, PLACE TUBE/STENT;  Surgeon: Mandeep Park MD;  Location: [facility]     ENDOSCOPIC RETROGRADE CHOLANGIOPANCREATOGRAM N/A 3/17/2016    Procedure: COMBINED ENDOSCOPIC RETROGRADE CHOLANGIOPANCREATOGRAPHY, REMOVE FOREIGN BODY OR STENT/TUBE;  Surgeon: Mandeep Park MD;  Location: [facility]     ENDOSCOPIC RETROGRADE CHOLANGIOPANCREATOGRAM N/A 2016    Procedure: COMBINED ENDOSCOPIC RETROGRADE CHOLANGIOPANCREATOGRAPHY, PLACE TUBE/STENT;  Surgeon: Mandeep Park MD;  [facility]: [facility]     ENDOSCOPIC RETROGRADE CHOLANGIOPANCREATOGRAM N/A 2016    Procedure: COMBINED ENDOSCOPIC RETROGRADE CHOLANGIOPANCREATOGRAPHY, REMOVE FOREIGN BODY OR STENT/TUBE;  Surgeon: Mandeep Park MD;  Location: [facility]     ENDOSCOPIC ULTRASOUND UPPER GASTROINTESTINAL TRACT (GI) N/A 10/3/2016    Procedure: ENDOSCOPIC ULTRASOUND, ESOPHAGOSCOPY / UPPER GASTROINTESTINAL TRACT (GI);  Surgeon: Guru Jose Rodas  MD Tuyet;  Location: [facility]     ESOPHAGOSCOPY, GASTROSCOPY, DUODENOSCOPY (EGD), COMBINED N/A 6/24/2015    Procedure: COMBINED ESOPHAGOSCOPY, GASTROSCOPY, DUODENOSCOPY (EGD), REMOVE FOREIGN BODY;  Surgeon: Mandeep Park MD;  [facility]: [facility]     ESOPHAGOSCOPY, GASTROSCOPY, DUODENOSCOPY (EGD), COMBINED N/A 10/25/2015    Procedure: COMBINED ESOPHAGOSCOPY, GASTROSCOPY, DUODENOSCOPY (EGD);  Surgeon: Sammy Amaro MD;  [facility]     ESOPHAGOSCOPY, GASTROSCOPY, DUODENOSCOPY (EGD), COMBINED N/A 10/25/2015    Procedure: COMBINED ESOPHAGOSCOPY, GASTROSCOPY, DUODENOSCOPY (EGD), BIOPSY SINGLE OR MULTIPLE;  Surgeon: Sammy Amaro MD;  Location: [facility]     ESOPHAGOSCOPY, GASTROSCOPY, DUODENOSCOPY (EGD), DILATATION, COMBINED       EXCISE LESION TRUNK N/A 4/17/2017    Procedure: EXCISE LESION TRUNK;  Removal of Abdominal Foreign Body;  Surgeon: Nestor Phoenix MD;  [facility]     HC ESOPH/GAS REFLUX TEST W NASAL IMPED >1 HR N/A 11/19/2015    Procedure: ESOPHAGEAL IMPEDENCE FUNCTION TEST WITH 24 HOUR PH GREATER THAN 1 HOUR;  Surgeon: Thiago Apple MD;  Location: [facility]     HC UGI ENDOSCOPY DIAG W BIOPSY  9/17/08     HC UGI ENDOSCOPY DIAG W BIOPSY  9/27/12     HC UGI ENDOSCOPY W ESOPHAGEAL DILATION BALLOON <30MM  9/17/08     HC UGI ENDOSCOPY W EUS N/A 5/5/2015    Procedure: COMBINED ENDOSCOPIC ULTRASOUND, ESOPHAGOSCOPY, GASTROSCOPY, DUODENOSCOPY (EGD);  Surgeon: Wm Dueñas MD;  Location: [facility]     HC WRIST ARTHROSCOP,RELEASE XVERS LIG Bilateral 12/17/08     INJECT TRANSVERSUS ABDOMINIS PLANE (TAP) BLOCK BILATERAL Left 9/22/2016    Procedure: INJECT TRANSVERSUS ABDOMINIS PLANE (TAP) BLOCK BILATERAL;  Surgeon: Dickson Corrigan MD;  [facility]     laparoscopic pineda  1995     LAPAROSCOPIC HERNIORRHAPHY INCISIONAL N/A 8/23/2018    Procedure: LAPAROSCOPIC HERNIORRHAPHY INCISIONAL;  Laparoscopic Incisional Hernia Repair with Symbotex Mesh Implant;  Surgeon: Nestor Phoenix MD;   [facility]     LAPAROSCOPIC PANCREATECTOMY, TRANSPLANT AUTO ISLET CELL N/A 12/28/2016    Procedure: LAPAROSCOPIC PANCREATECTOMY, TRANSPLANT AUTO ISLET CELL;  Surgeon: Nestor Phoenix MD;  [facility]     REPLACE GASTROJEJUNOSTOMY TUBE, PERCUTANEOUS N/A 9/7/2021    Procedure: GASTROJEJUNOSTOMY TUBE PLACEMENT, PERCUTANEOUS, WITH GASTROPEXY;  Surgeon: Mandeep Park MD;  [facility]     REPLACE GASTROJEJUNOSTOMY TUBE, PERCUTANEOUS N/A 9/22/2021    Procedure: REPLACEMENT, GASTROJEJUNOSTOMY TUBE, PERCUTANEOUS;  Surgeon: Zackery Montoya MD;  [facility]     REPLACE JEJUNOSTOMY TUBE, PERCUTANEOUS N/A 9/10/2021    Procedure: UPPER ENDOSCOPY, REPLACEMENT OF PERCUTANEOUS GASTROJEJUNOSTOMY TUBE, T-TAG GASTROPEXY;  Surgeon: Zackery Montoya MD;  Location: [facility]     transphenoidal pituitary resection  1990          Medications:     Current Facility-Administered Medications   Medication     acetaminophen (TYLENOL) tablet 1,000 mg     amitriptyline (ELAVIL) tablet 60 mg     amylase-lipase-protease (CREON 24) 27303-46580 units per EC capsule 1-2 capsule     amylase-lipase-protease (CREON 24) 48349-49165 units per EC capsule 3-4 capsule     cyclobenzaprine (FLEXERIL) tablet 10 mg     glucose gel 15-30 g    Or     dextrose 50 % injection 25-50 mL    Or     glucagon injection 1 mg     diphenhydrAMINE (BENADRYL) capsule 25 mg    Or     diphenhydrAMINE (BENADRYL) injection 25 mg     famotidine (PEPCID) infusion 20 mg     ibuprofen (ADVIL/MOTRIN) tablet 400 mg     lactated ringers infusion     levothyroxine (SYNTHROID/LEVOTHROID) tablet 137 mcg     melatonin tablet 1 mg     pantoprazole (PROTONIX) IV push injection 40 mg     polyethylene glycol (MIRALAX) Packet 17 g     prochlorperazine (COMPAZINE) injection 10 mg    Or     prochlorperazine (COMPAZINE) tablet 10 mg    Or     prochlorperazine (COMPAZINE) suppository 25 mg     promethazine (PHENERGAN) 12.5 mg in sodium chloride 0.9 % 50 mL intermittent  infusion     Prucalopride Succinate TABS 2 mg     scopolamine (TRANSDERM) 72 hr patch 1 patch     scopolamine (TRANSDERM-SCOP) Patch in Place     senna-docusate (SENOKOT-S/PERICOLACE) 8.6-50 MG per tablet 2 tablet     ZOLMitriptan (ZOMIG) tablet 5 mg            Allergies      Allergies   Allergen Reactions     Apple Anaphylaxis     Corticosteroids Other (See Comments)     All oral, IV and injectable steroids are contraindicated (unless in life threatening situations) in [facility] transplant recipients. They can cause irreversible loss of islet cell function. Please contact patient's transplant care coordinator ADI Gaffney RN at [phone]/pager [phone] and/or endocrinologist prior to administration.       Depakote [Divalproex Sodium] Other (See Comments)     Chest pain     Zithromax [Azithromycin Dihydrate] Anaphylaxis     Noted in 4/7/08 ER     Darvocet [Propoxyphene N-Apap] Itching     Morphine Nausea and Vomiting and Rash     Nalbuphine Hcl Rash     RASH :nubaine     Zosyn [Piperacillin-Tazobactam In D5w] Rash     Possible allergy, did have a diffuse rash that seemed drug related but could have also been related to soap in the hospital.      Bactrim [Sulfamethoxazole W-Trimethoprim] Other (See Comments) and Nausea and Vomiting     Severely low liver function.     Cats      Compazine [Prochlorperazine] Other (See Comments)     Twitching. Takes Benedryl and is fine     Dust Mites      Grass      Ragweeds      Tape [Adhesive Tape] Blisters     Tramadol      Trees      Zofran [Ondansetron] Other (See Comments)     migraine     Flagyl [Metronidazole] Hives and Rash            Social History:   Reviewed and edited as appropriate  Social History     Socioeconomic History     Marital status:      Spouse name: Norris     Number of children: 3     Years of education: 16     Highest education level: Not on file   Occupational History     Occupation: director     Employer: [employer]   Tobacco Use     Smoking  status: Former Smoker     Packs/day: 0.50     Years: 6.00     Pack years: 3.00     Types: Cigarettes     Start date: 1985     Quit date: 1992     Years since quittin.9     Smokeless tobacco: Never Used     Tobacco comment: no 2nd hand   Substance and Sexual Activity     Alcohol use: Not Currently     Alcohol/week: 3.0 - 6.0 standard drinks     Types: 1 - 2 Glasses of wine, 1 - 2 Cans of beer, 1 - 2 Shots of liquor per week     Comment: none since IGG     Drug use: No     Sexual activity: Yes     Partners: Male     Birth control/protection: None     Comment: Hystectomy    Other Topics Concern     Parent/sibling w/ CABG, MI or angioplasty before 65F 55M? No      Service Not Asked     Blood Transfusions No     Caffeine Concern Not Asked     Occupational Exposure Not Asked     Hobby Hazards Not Asked     Sleep Concern Not Asked     Stress Concern Not Asked     Weight Concern Not Asked     Special Diet Not Asked     Back Care Not Asked     Exercise Not Asked     Bike Helmet Not Asked     Seat Belt Yes     Self-Exams Not Asked   Social History Narrative     with 3 children and a dog.  No smoking, etoh or drug use.  Worked as a  for [employer] in [place] in the past.  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     Social Determinants of Health     Financial Resource Strain: Not on file   Food Insecurity: Not on file   Transportation Needs: Not on file   Physical Activity: Not on file   Stress: Not on file   Social Connections: Not on file   Intimate Partner Violence: Not on file   Housing Stability: Not on file           Family History:   Reviewed and edited as appropriate  Family History   Problem Relation Age of Onset     Eye Disorder Father         cataract, detached retina     Myocardial Infarction Father 60     Lipids Father      Cerebrovascular Disease Father      Depression Father      Substance Abuse Father      Anesthesia Reaction  Father         stroke right after surgery     Cataracts Father      Osteoarthritis Father      Ulcerative Colitis Father      Lipids Mother      Hypertension Mother      Thyroid Disease Mother      Depression Mother      Angina Mother      GERD Mother      Skin Cancer Mother      Eye Disorder Son         ptosis     Eye Disorder Paternal Grandmother         cataract     Eye Disorder Paternal Grandfather         cataract     Diabetes Paternal Grandfather      Eye Disorder Maternal Grandmother         cataract     Thyroid Disease Maternal Grandmother      Coronary Artery Disease Sister         angioplasty     GERD Sister      Substance Abuse Sister      Depression Sister      Depression Son      Anxiety Disorder Son      Thyroid Disease Sister      Diabetes Maternal Grandfather      Depression Nephew      Anxiety Disorder Nephew      Thyroid Disease Nephew      Diabetes Type 2  Cousin         paternal cousin     Heart Disease Paternal Aunt      Diabetes Paternal Aunt      Diabetes Paternal Uncle      Heart Disease Paternal Uncle           Review of Systems:   A complete 10 point review of systems was obtained.  Please see the HPI for pertinent positives and negatives.  All other systems were reviewed and were found to be negative.          Physical Exam:   VS:  /56 (BP Location: Right arm)   Pulse 82   Temp 97.7  F (36.5  C) (Oral)   Resp 16   SpO2 98%     Wt:   Wt Readings from Last 2 Encounters:   11/03/21 64 kg (141 lb)   10/19/21 59.7 kg (131 lb 9.8 oz)      Constitutional: cooperative, pleasant, no acute distress  Eyes: Conjunctiva anicteric. Glasses in place  HEENT: Normal oropharynx without ulcers or exudate, mucus membranes moist  CV: No Tessa edema bilaterally  Respiratory: Breathing comfortably on ambient air  Abd:GJ tube in place, c/d/i; mild TTP in LUQ. No rebound or guarding   Skin: warm, perfused, no jaundice  Neuro: A&O x 3         Laboratory:   [facility]  Recent Labs   Lab 11/28/21  [id number]  11/27/21 1848   NA  --  139   POTASSIUM  --  3.9   CHLORIDE  --  106   KASSI  --  9.1   CO2  --  29   BUN  --  12   CR  --  0.68   * 88     CBC  Recent Labs   Lab 11/27/21 1848   WBC 5.5   RBC 4.25   HGB 13.6   HCT 41.3   MCV 97   MCH 32.0   MCHC 32.9   RDW 13.7        INR  Recent Labs   Lab 11/27/21 1848   INR 1.04     Liver Enzymes  Recent Labs   Lab 11/27/21 1848   ALKPHOS 92   AST 21   ALT 32   BILITOTAL 0.6   PROTTOTAL 7.3   ALBUMIN 3.7      Imaging/Procedures/Studies:   Abd X-ray 11/27/21  IMPRESSION: Percutaneous gastrojejunostomy tube tip terminates over the proximal jejunum, though there is a 5 cm length of the catheter tubing proximally that appears redundant, coiled, and possibly twisted. This may affect tube function. Moderate amount   of formed colonic stool. No visible small bowel dilatation. Numerous surgical clips in the upper abdomen.    EGD 10/22/21  Impression:          - TPIAT anatomy with widely patent duodenojejunostomy                        and jejunojejunostomy                        - Uncomplicated exchange of clogged one piece weight GJ                        with a well positioned 18F 57cm one piece GJ without                        gastric curl and tip into the feeding limb , likely at                        level of jejeunojejunostomy   Recommendation:      - Observe patient in PACU for possible discharge same                        day.                        - J tube for feeds only and these sessions must be                        followed by water flushes; this may be used without delay                        - G tube may be used for medications and venting without                        delay                        - Activity and oral diet as tolerated may resume as may                        all medications                        - The findings and recommendations were discussed with                        the patient and their family.

## 2021-11-28 NOTE — ED PROVIDER NOTES
[place] EMERGENCY DEPARTMENT ([facility])  21     History   Chief Complaint:  Clogged GJ tube    HPI  Dinora Mcghee is a 56 year old female with history of chronic pancreatitis s/p pancreatectomy, gastroparesis with GJ tube in place who presents with clogged GJ tube.  She states that she called GI clinic and was instructed to come to the ED for further evaluation.  She has chronic nausea.  She has having some left upper quadrant pain.  She has no fever.  She has no vomiting.  She has no dysuria or urinary frequency.    Epic records reviewed.  She has a prior history of tube kinking and coiling, last noted to have this during ED visit on 10/16/2021.  Her GJ tube was last replaced by Dr. Park on 10/19/2021.     Patient has had 3 doses of the Moderna Covid vaccine, last dose given on 2021.    Past Medical History  Past Medical History:   Diagnosis Date     Allergic rhinitis, cause unspecified      Allergy to other foods     strawberries, apples, celeries, alice, watermelon     Arthritis     left knee     Choledocholithiasis     long after cholecystectomy     Chronic abdominal pain      Chronic constipation      Chronic nausea      Chronic pancreatitis (H)      Degeneration of lumbar or lumbosacral intervertebral disc      Esophageal reflux     w/ hiatal hernia     Gastroparesis      Hiatal hernia      History of pituitary adenoma     s/p resection     Hypothyroidism      Migraines      Mild hyperlipidemia      On tube feeding diet     presence of GJ tube     Pancreatic disease     PD stricture, 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 endometriosis growth     APPENDECTOMY       C  DELIVERY ONLY       C  DELIVERY ONLY       repeat c section with incidental cystotomy with repair     C EXCIS PITUITARY,TRANSNASAL/SEPTAL  1980    pituitary tumor removed for diabetes insipidus     C TOTAL ABDOM HYSTERECTOMY      w/ bilateral salpingoophorectomy       SECTION       COLONOSCOPY       ENDOSCOPIC RETROGRADE CHOLANGIOPANCREATOGRAM N/A 2015    Procedure: ENDOSCOPIC RETROGRADE CHOLANGIOPANCREATOGRAM;  Surgeon: Mandeep Park MD;  Location: [facility]     ENDOSCOPIC RETROGRADE CHOLANGIOPANCREATOGRAM N/A 2016    Procedure: COMBINED ENDOSCOPIC RETROGRADE CHOLANGIOPANCREATOGRAPHY, PLACE TUBE/STENT;  Surgeon: Mandeep Park MD;  [facility]     ENDOSCOPIC RETROGRADE CHOLANGIOPANCREATOGRAM N/A 3/17/2016    Procedure: COMBINED ENDOSCOPIC RETROGRADE CHOLANGIOPANCREATOGRAPHY, REMOVE FOREIGN BODY OR STENT/TUBE;  Surgeon: Mandeep Park MD;  Location: [facility]     ENDOSCOPIC RETROGRADE CHOLANGIOPANCREATOGRAM N/A 2016    Procedure: COMBINED ENDOSCOPIC RETROGRADE CHOLANGIOPANCREATOGRAPHY, PLACE TUBE/STENT;  Surgeon: Mandeep Park MD;  [facility]     ENDOSCOPIC RETROGRADE CHOLANGIOPANCREATOGRAM N/A 2016    Procedure: COMBINED ENDOSCOPIC RETROGRADE CHOLANGIOPANCREATOGRAPHY, REMOVE FOREIGN BODY OR STENT/TUBE;  Surgeon: Mandeep Park MD;  [facility]     ENDOSCOPIC ULTRASOUND UPPER GASTROINTESTINAL TRACT (GI) N/A 10/3/2016    Procedure: ENDOSCOPIC ULTRASOUND, ESOPHAGOSCOPY / UPPER GASTROINTESTINAL TRACT (GI);  Surgeon: Guru Jose Rodas MD;  Location[facility]     ESOPHAGOSCOPY, GASTROSCOPY, DUODENOSCOPY (EGD), COMBINED N/A 2015    Procedure: COMBINED ESOPHAGOSCOPY, GASTROSCOPY, DUODENOSCOPY (EGD), REMOVE FOREIGN BODY;  Surgeon: Mandeep Park MD;  [facility]     ESOPHAGOSCOPY, GASTROSCOPY, DUODENOSCOPY (EGD), COMBINED N/A 10/25/2015    Procedure: COMBINED ESOPHAGOSCOPY, GASTROSCOPY, DUODENOSCOPY (EGD);  Surgeon: Sammy Amaro MD;   [facility]     ESOPHAGOSCOPY, GASTROSCOPY, DUODENOSCOPY (EGD), COMBINED N/A 10/25/2015    Procedure: COMBINED ESOPHAGOSCOPY, GASTROSCOPY, DUODENOSCOPY (EGD), BIOPSY SINGLE OR MULTIPLE;  Surgeon: Sammy Amaro MD;  Location: [facility]     ESOPHAGOSCOPY, GASTROSCOPY, DUODENOSCOPY (EGD), DILATATION, COMBINED       EXCISE LESION TRUNK N/A 4/17/2017    Procedure: EXCISE LESION TRUNK;  Removal of Abdominal Foreign Body;  Surgeon: Nestor Phoenix MD;  [facility]     HC ESOPH/GAS REFLUX TEST W NASAL IMPED >1 HR N/A 11/19/2015    Procedure: ESOPHAGEAL IMPEDENCE FUNCTION TEST WITH 24 HOUR PH GREATER THAN 1 HOUR;  Surgeon: Thiago Apple MD;  Location: [facility]     HC UGI ENDOSCOPY DIAG W BIOPSY  9/17/08     HC UGI ENDOSCOPY DIAG W BIOPSY  9/27/12     HC UGI ENDOSCOPY W ESOPHAGEAL DILATION BALLOON <30MM  9/17/08     HC UGI ENDOSCOPY W EUS N/A 5/5/2015    Procedure: COMBINED ENDOSCOPIC ULTRASOUND, ESOPHAGOSCOPY, GASTROSCOPY, DUODENOSCOPY (EGD);  Surgeon: Wm Dueñas MD;  Location: [facility]     HC WRIST ARTHROSCOP,RELEASE XVERS LIG Bilateral 12/17/08     INJECT TRANSVERSUS ABDOMINIS PLANE (TAP) BLOCK BILATERAL Left 9/22/2016    Procedure: INJECT TRANSVERSUS ABDOMINIS PLANE (TAP) BLOCK BILATERAL;  Surgeon: Dickson Corirgan MD;  [facility]     laparoscopic pineda  1995     LAPAROSCOPIC HERNIORRHAPHY INCISIONAL N/A 8/23/2018    Procedure: LAPAROSCOPIC HERNIORRHAPHY INCISIONAL;  Laparoscopic Incisional Hernia Repair with Symbotex Mesh Implant;  Surgeon: Nestor Phoenix MD;  [facility]     LAPAROSCOPIC PANCREATECTOMY, TRANSPLANT AUTO ISLET CELL N/A 12/28/2016    Procedure: LAPAROSCOPIC PANCREATECTOMY, TRANSPLANT AUTO ISLET CELL;  Surgeon: Nestor Phoenix MD;  [facility]     REPLACE GASTROJEJUNOSTOMY TUBE, PERCUTANEOUS N/A 9/7/2021    Procedure: GASTROJEJUNOSTOMY TUBE PLACEMENT, PERCUTANEOUS, WITH GASTROPEXY;  Surgeon: Mandeep Park MD;  [facility]     REPLACE GASTROJEJUNOSTOMY TUBE,  PERCUTANEOUS N/A 9/22/2021    Procedure: REPLACEMENT, GASTROJEJUNOSTOMY TUBE, PERCUTANEOUS;  Surgeon: Zackery Montoya MD;  [facility]     REPLACE JEJUNOSTOMY TUBE, PERCUTANEOUS N/A 9/10/2021    Procedure: UPPER ENDOSCOPY, REPLACEMENT OF PERCUTANEOUS GASTROJEJUNOSTOMY TUBE, T-TAG GASTROPEXY;  Surgeon: Zackery Montoya MD;  [facility]     transphenoidal pituitary resection  1990     No current outpatient medications on file.    Allergies   Allergen Reactions     Apple Anaphylaxis     Corticosteroids Other (See Comments)     All oral, IV and injectable steroids are contraindicated (unless in life threatening situations) in [facility] [employer] transplant recipients. They can cause irreversible loss of islet cell function. Please contact patient's transplant care coordinator ADI Gaffney RN at [phone]/pager [phone] and/or endocrinologist prior to administration.       Depakote [Divalproex Sodium] Other (See Comments)     Chest pain     Zithromax [Azithromycin Dihydrate] Anaphylaxis     Noted in 4/7/08 ER     Bromfenac      Other reaction(s): Headache     Codeine      Other reaction(s): Hallucinations     Darvocet [Propoxyphene N-Apap] Itching     Hydromorphone Other (See Comments)     Loopy     Morphine Nausea and Vomiting and Rash     Nalbuphine Hives and Rash     Other reaction(s): Rash  Nubain       Nalbuphine Hcl Rash     RASH :nubaine     Zosyn [Piperacillin-Tazobactam In D5w] Rash     Possible allergy, did have a diffuse rash that seemed drug related but could have also been related to soap in the hospital.      Bactrim [Sulfamethoxazole W-Trimethoprim] Other (See Comments) and Nausea and Vomiting     Severely low liver function.     Cats      Compazine [Prochlorperazine] Other (See Comments)     Twitching. Takes Benedryl and is fine     Dust Mites      Grass      Ragweeds      Tape [Adhesive Tape] Blisters     Tramadol      Trees      Zofran [Ondansetron] Other (See Comments)     migraine      Flagyl [Metronidazole] Hives and Rash     Family History  Family History   Problem Relation Age of Onset     Eye Disorder Father         cataract, detached retina     Myocardial Infarction Father 60     Lipids Father      Cerebrovascular Disease Father      Depression Father      Substance Abuse Father      Anesthesia Reaction Father         stroke right after surgery     Cataracts Father      Osteoarthritis Father      Ulcerative Colitis Father      Lipids Mother      Hypertension Mother      Thyroid Disease Mother      Depression Mother      Angina Mother      GERD Mother      Skin Cancer Mother      Eye Disorder Son         ptosis     Eye Disorder Paternal Grandmother         cataract     Eye Disorder Paternal Grandfather         cataract     Diabetes Paternal Grandfather      Eye Disorder Maternal Grandmother         cataract     Thyroid Disease Maternal Grandmother      Coronary Artery Disease Sister         angioplasty     GERD Sister      Substance Abuse Sister      Depression Sister      Depression Son      Anxiety Disorder Son      Thyroid Disease Sister      Diabetes Maternal Grandfather      Depression Nephew      Anxiety Disorder Nephew      Thyroid Disease Nephew      Diabetes Type 2  Cousin         paternal cousin     Heart Disease Paternal Aunt      Diabetes Paternal Aunt      Diabetes Paternal Uncle      Heart Disease Paternal Uncle      Social History   Social History     Tobacco Use     Smoking status: Former Smoker     Packs/day: 0.50     Years: 6.00     Pack years: 3.00     Types: Cigarettes     Start date: 1985     Quit date: 1992     Years since quittin.9     Smokeless tobacco: Never Used     Tobacco comment: no 2nd hand   Substance Use Topics     Alcohol use: Not Currently     Alcohol/week: 3.0 - 6.0 standard drinks     Types: 1 - 2 Glasses of wine, 1 - 2 Cans of beer, 1 - 2 Shots of liquor per week     Comment: none since IGG     Drug use: No      Past medical history, past  surgical history, medications, allergies, family history, and social history were reviewed with the patient. No additional pertinent items.       Review of Systems   Constitutional: Negative for chills and fever.   Respiratory: Negative for cough and shortness of breath.    Cardiovascular: Negative for chest pain.   Gastrointestinal: Positive for abdominal pain. Negative for nausea and vomiting.   Genitourinary: Negative for dysuria.   Musculoskeletal: Negative for arthralgias and back pain.   Neurological: Negative for light-headedness.   Hematological: Negative for adenopathy.   Psychiatric/Behavioral: Negative for dysphoric mood.         Physical Exam   BP: 114/75  Pulse: 112  Temp: 98.2  F (36.8  C)  Resp: 16  SpO2: 98 %  Physical Exam  Constitutional:       General: She is not in acute distress.     Appearance: She is well-developed.   HENT:      Head: Normocephalic and atraumatic.   Eyes:      Conjunctiva/sclera: Conjunctivae normal.      Pupils: Pupils are equal, round, and reactive to light.   Neck:      Thyroid: No thyromegaly.      Trachea: No tracheal deviation.   Cardiovascular:      Rate and Rhythm: Normal rate and regular rhythm.      Heart sounds: Normal heart sounds. No murmur heard.      Pulmonary:      Effort: Pulmonary effort is normal. No respiratory distress.      Breath sounds: Normal breath sounds. No wheezing.   Chest:      Chest wall: No tenderness.   Abdominal:      General: There is no distension.      Palpations: Abdomen is soft.      Tenderness: There is no abdominal tenderness.       Musculoskeletal:         General: No tenderness.      Cervical back: Normal range of motion and neck supple.   Skin:     General: Skin is warm.      Findings: No rash.   Neurological:      Mental Status: She is alert and oriented to person, place, and time.      Sensory: No sensory deficit.   Psychiatric:         Behavior: Behavior normal.         ED Course      Procedures            Results for orders  placed or performed during the hospital encounter of 11/27/21   Comprehensive metabolic panel     Status: Normal   Result Value Ref Range    Sodium 139 133 - 144 mmol/L    Potassium 3.9 3.4 - 5.3 mmol/L    Chloride 106 94 - 109 mmol/L    Carbon Dioxide (CO2) 29 20 - 32 mmol/L    Anion Gap 4 3 - 14 mmol/L    Urea Nitrogen 12 7 - 30 mg/dL    Creatinine 0.68 0.52 - 1.04 mg/dL    Calcium 9.1 8.5 - 10.1 mg/dL    Glucose 88 70 - 99 mg/dL    Alkaline Phosphatase 92 40 - 150 U/L    AST 21 0 - 45 U/L    ALT 32 0 - 50 U/L    Protein Total 7.3 6.8 - 8.8 g/dL    Albumin 3.7 3.4 - 5.0 g/dL    Bilirubin Total 0.6 0.2 - 1.3 mg/dL    GFR Estimate >90 >60 mL/min/1.73m2   CBC with platelets and differential     Status: None   Result Value Ref Range    WBC Count 5.5 4.0 - 11.0 10e3/uL    RBC Count 4.25 3.80 - 5.20 10e6/uL    Hemoglobin 13.6 11.7 - 15.7 g/dL    Hematocrit 41.3 35.0 - 47.0 %    MCV 97 78 - 100 fL    MCH 32.0 26.5 - 33.0 pg    MCHC 32.9 31.5 - 36.5 g/dL    RDW 13.7 10.0 - 15.0 %    Platelet Count 398 150 - 450 10e3/uL    % Neutrophils 42 %    % Lymphocytes 41 %    % Monocytes 12 %    % Eosinophils 3 %    % Basophils 2 %    % Immature Granulocytes 0 %    NRBCs per 100 WBC 0 <1 /100    Absolute Neutrophils 2.4 1.6 - 8.3 10e3/uL    Absolute Lymphocytes 2.3 0.8 - 5.3 10e3/uL    Absolute Monocytes 0.6 0.0 - 1.3 10e3/uL    Absolute Eosinophils 0.2 0.0 - 0.7 10e3/uL    Absolute Basophils 0.1 0.0 - 0.2 10e3/uL    Absolute Immature Granulocytes 0.0 <=0.0 10e3/uL    Absolute NRBCs 0.0 10e3/uL     Medications   0.9% sodium chloride BOLUS (has no administration in time range)        Assessments & Plan (with Medical Decision Making)   Patient is a 56-year-old female who has a blocked GJ tube.  She is unable to take oral intake and is at risk for dehydration without the tube working.  She has some mild left upper quadrant tenderness.  She has chronic nausea.  She has no fever.    On exam, patient is a blood pressure 114/75 with a  pulse rate of 112 and temperature of 98.2.  She has mild left upper quadrant abdominal discomfort.    I was contacted by Dr. Park.  He reports that Dr. Montoya is able to perform a direct J-tube placement tomorrow.  She will be placed on observation status for IV fluids and symptom management.    Abdominal Xray shows the percutaneous gastrojejunostomy tube tip terminates over the proximal jejunum, though there is a 5 cm length of the catheter tubing proximally that appears redundant, coiled, and possibly twisted. This may affect tube function. Moderate amount   of formed colonic stool. No visible small bowel dilatation. Numerous surgical clips in the upper abdomen.    Patient's comprehensive metabolic profile and CBC are unremarkable.    I spoke with Yoselyn Pichardo from the observation unit who accepted the patient for admission.    I have reviewed the nursing notes. I have reviewed the findings, diagnosis, plan and need for follow up with the patient.    New Prescriptions    No medications on file       Final diagnoses:   Gastrostomy tube obstruction (H)       --  Prabhjot Davila  [facility] EMERGENCY DEPARTMENT  11/27/2021     Prabhjot Davila MD  11/28/21 [id number]

## 2021-11-28 NOTE — PLAN OF CARE
"OBSERVATION GOALS:     - Diagnostic tests and consults completed and resulted:  Met  - Vital signs normal or at patient baseline:  Met; VSS on RA  - Tolerating oral intake to maintain hydration:  In progress  - Adequate pain control on oral analgesics:  In progress; reported pain on site.  IV dilaudid administered with relief.  - Returns to baseline functional status:  In progress  - GI consult completed with dispo plan:  Met    A&Ox4.  Abdomen soft & tender to touch.  BS audible & normoactive.  G-tube dressing CDI.  J-tube draining brown color.  POC discussed.  Pt and spouse agreeable.    Nurse to notify provider when observation goals have been met and patient is ready for discharge.    /67 (BP Location: Right arm)   Pulse 81   Temp 97.3  F (36.3  C) (Oral)   Resp 13   Ht 1.702 m (5' 7\")   Wt 60.3 kg (133 lb)   SpO2 100%   BMI 20.83 kg/m      "

## 2021-11-28 NOTE — PROGRESS NOTES
Observation goals  PRIOR TO DISCHARGE        Comments: -diagnostic tests and consults completed and resulted: No    -vital signs normal or at patient baseline:Met, /88   Pulse 95   Temp 98.1  F (36.7  C) (Oral)   Resp 18   SpO2 98% RA     -tolerating oral intake to maintain hydration : Not met, NPO at midnight, continues with LR at 125 ml/hr    -adequate pain control on oral analgesics: In progress    -returns to baseline functional status: Not met, admitted with GJ tube problem    -GI consult completed with dispo plan : In progress    Nurse to notify provider when observation goals have been met and patient is ready for discharge.

## 2021-11-28 NOTE — DISCHARGE SUMMARY
All observation goals met.  Patient verbalized understanding of discharge instructions.  PIV removed.  Belongings and Rx with pt.  Patient transported to main lobby doors for ride home with spouse.

## 2021-11-28 NOTE — DISCHARGE SUMMARY
[facility]  Hospitalist Discharge Summary      Date of Admission:  11/27/2021  Date of Discharge:  11/28/2021  Discharging Provider: NATALY Garcia CNP  Discharge Team: Emergency Department Observation Unit    Discharge Diagnoses   G J tube replacement     Follow-ups Needed After Discharge   Follow-up Appointments     Adult [facility] Follow-up and recommended labs and tests      Follow up with primary care provider, Latasha Paul, within 7 days   for hospital follow- up.   Follow up with outpatient GI      Appointments on [place] and/or [facility] (with [facility] or [facility]   provider or service). Call [phone] if you haven't heard regarding   these appointments within 7 days of discharge.         {Additional follow-up instructions/to-do's for PCP    : Hospital follow up    Unresulted Labs Ordered in the Past 30 Days of this Admission     No orders found for last 31 day(s).      These results will be followed up by PCP    Discharge Disposition   Discharged to home  Condition at discharge: Stable    Hospital Course   Dinora Mcghee is a 56 year old female admitted on 11/27/2021. 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      ##. Acute on Chronic Abdominal Pain    ##. Clogged GJ Tube  ##. Gastroparesis  ##. Chronic pancreatitis s/p TPAIT  ##. GJ tube with dependence on enteral feeding  States G-J has not been functional since 2PM 11/26.  Has had trouble with it x last 3 weeks intermittently. All measures with declogging tube unsuccessful this time. Reports LUQ pain, acute on chronic nausea. Denies fever, chills, vomiting, dysuria, frequency. GI team asked patient to present to ED. Uses 3 cans of Cloakroom Peptide formula/day x16 hours usually starting at 6am.  Takes very minimal by mouth. AXR reports percutaneous gastrojejunostomy tube tip terminates over the proximal jejunum, though there is a 5 cm length of the catheter tubing proximally that appears redundant, coiled, and possibly twisted, moderate amount  of formed colonic stool; no visible small bowel dilatation; numerous surgical clips in the upper abdomen. In the ED the patient was given 500ml NS bolus. Patient underwent a separate G j tube placement per dr. Montoya. GASTROSTOMY IS RIGHT UPPER QUADRANT, JEJUNOSTOMY IS LEFT LOWER/MID QUADRANT. Patient should expect pain at the site for the next 1-2 days. Seek medical care if any signs of complication  such as increasing abdominal pain or bleeding.  Jejuostomy tube may be used for hydration and tube feeds  Low profile gastrostomy tube may be used for decompression (gravity bag) without delay (if desired). Patient tolerated oral intake prior to discharge. Discharged with Dilaudid solution prn for pain post G J tube placement.   - Continue PTA Compazine or Phenergan prn nausea and pretreat with Benadryl  - Continue PTA Scopolamine patch     ##. Chronic constipation:  AXR reports moderate amount of formed colonic stool; no visible small bowel dilatation  - Per GI recommend: Continue bowel regimen of: Motegrity 2 mg daily, Senna-colace 2 tabs BID and miralax TID     --- CHRONIC ISSUES ---   ##. History of IgG4 pseudotumor: Mild LFT abnormality with mildly elevated IgG4. Liver lesion previously biopsied with noticeable proliferation and acute and chronic inflammation and fibrosis of liver with focally increased IgG4-positive plasma cells consistent  "with IgG4 pseudotumor. Followed by hepatology.   - Outpatient follow up with GI      ##. GERD: - Resume famotidine po 20 mg BID, omeprazole po 40 mg BID at discharge.      ##. RAD  ##. Allergic rhinitis  - Continue Cetrizine 10 mg daily  - Continue Benadryl 25 mg Q6H PRN  - Resume PTA montelukast 10 mg daily at discharge. Patient does not want this ordered here     ##. Hypothyroidism: last TSH  0.67 on 9/16/121  - Continue PTA Levothyroxine 137 mcg daily      ##. History of Migraine HA: - Continue PTA Amitriptyline 60 mg at bedtime  - Continue Zolmitriptan 5 mg as needed at onset of headache      ##. Depression: Attempted Mirtazapine at last admission, however patient reported \"loopiness\" the second morning after taking it so it was discontinued. Avoid mirtazapine in the future. Follows with OP Psychologist.   -Continue PTA Amitryptyline     Consultations This Hospital Stay   GI LUMINAL ADULT IP CONSULT  NUTRITION SERVICES ADULT IP CONSULT    Code Status   Full Code    Time Spent on this Encounter   I, NATALY Garcia CNP, personally saw the patient today and spent less than or equal to 30 minutes discharging this patient.       NATALY Garcia CNP  [facility] [address] [address]  Phone: [phone]  Fax: [phone]  ______________________________________________________________________    Physical Exam   Vital Signs: Temp: 97.3  F (36.3  C) Temp src: Oral BP: 111/67 Pulse: 81   Resp: 13 SpO2: 100 % O2 Device: None (Room air) Oxygen Delivery: 2 LPM  Weight: 133 lbs 0 oz  Constitutional: awake, alert, cooperative, no apparent distress, and appears stated age  Eyes: Lids and lashes normal, pupils equal, round and reactive to light, extra ocular muscles intact, sclera clear, conjunctiva normal  ENT: Normocephalic, without obvious abnormality, atraumatic, sinuses nontender on palpation, external ears without lesions, oral pharynx with " moist mucous membranes, tonsils without erythema or exudates, gums normal and good dentition.  Hematologic / Lymphatic: no cervical lymphadenopathy  Respiratory: No increased work of breathing, good air exchange, clear to auscultation bilaterally, no crackles or wheezing  Cardiovascular: Normal apical impulse, regular rate and rhythm, normal S1 and S2, no S3 or S4, and no murmur noted  GI: No scars, normal bowel sounds, soft, non-distended, LUQ and mid left lateral tenderness, no masses palpated, no hepatosplenomegally. GJ tube in place with no redness or drainage at stoma.  Skin: no bruising or bleeding  Musculoskeletal: There is no redness, warmth, or swelling of the joints.  Full range of motion noted.  Motor strength is 5 out of 5 all extremities bilaterally.  Tone is normal.  Neurologic: Awake, alert, oriented to name, place and time.  Cranial nerves II-XII are grossly intact.  Motor is 5 out of 5 bilaterally.  Cerebellar finger to nose, heel to shin intact.  Sensory is intact.  Babinski down going, Romberg negative, and gait is normal.  Neuropsychiatric: General: normal, calm           Primary Care Physician   Latasha Paul    Discharge Orders      Reason for your hospital stay    G and J tube replacement     Activity    Your activity upon discharge: activity as tolerated     Adult [facility] Follow-up and recommended labs and tests    Follow up with primary care provider, Latasha Paul, within 7 days for hospital follow- up.   Follow up with outpatient GI      Appointments on [place] and/or [facility] (with [facility] or [facility] provider or service). Call [phone] if you haven't heard regarding these appointments within 7 days of discharge.     When to contact your care team    Seek medical care if any signs of complication such as increasing abdominal pain or bleeding     Discharge Instructions    You were admitted to the ED observation unit at the [facility] for a clogged GJJ tube. You were seen by  GI and you underwent a separate G j tube placement per dr. Montoya. Per GI, you should expect pain at the site for the next 1-2 days. Seek medical care if any signs of complication  such as increasing abdominal pain or bleeding.  Jejuostomy tube may be used for hydration and tube feeds  Low profile gastrostomy tube may be used for decompression (gravity bag) Recommend follow up with your primary care doctor in one week for hospital follow up. Follow up outpatient GI team.     Diet    Follow this diet upon discharge: Regular       Significant Results and Procedures   Results for orders placed or performed during the hospital encounter of 11/27/21   XR Abdomen Port 1 View    Narrative    EXAM: XR ABDOMEN PORT 1 VIEWS  LOCATION: [facility]  DATE/TIME: 11/27/2021 9:46 PM    INDICATION: GJ tube clogged  COMPARISON: 10/21/2021.      Impression    IMPRESSION: Percutaneous gastrojejunostomy tube tip terminates over the proximal jejunum, though there is a 5 cm length of the catheter tubing proximally that appears redundant, coiled, and possibly twisted. This may affect tube function. Moderate amount   of formed colonic stool. No visible small bowel dilatation. Numerous surgical clips in the upper abdomen.   XR Surgery GAMA Fluoro L/T 5 Min w Stills    Narrative    This exam was marked as non-reportable because it will not be read by a   radiologist or a [place] non-radiologist provider.           *Note: Due to a large number of results and/or encounters for the requested time period, some results have not been displayed. A complete set of results can be found in Results Review.       Discharge Medications   Current Discharge Medication List      START taking these medications    Details   HYDROmorphone, STANDARD CONC, (DILAUDID) 1 MG/ML oral solution Take 1-2 mLs (1-2 mg) by mouth every 4 hours as needed for moderate to severe pain (Post G and J tube placement)  Qty: 168 mL,  Refills: 0    Associated Diagnoses: Acute post-operative pain         CONTINUE these medications which have NOT CHANGED    Details   acetaminophen (TYLENOL) 500 MG tablet Take 2 tablets (1,000 mg) by mouth every 8 hours    Associated Diagnoses: Abdominal spasms      !! amylase-lipase-protease (CREON 24) [address]64-54[address] units CPEP per EC capsule Take 1-2 capsules by mouth Take with snacks or supplements      !! amylase-lipase-protease (CREON) 74882-53619 units CPEP per EC capsule Take 3-4 capsules by mouth 3 times daily (with meals) With oral meals      !! amitriptyline (ELAVIL) 10 MG tablet Take 10 mg by mouth At Bedtime Take with a 50mg tab for a total of 60mg      !! amitriptyline (ELAVIL) 50 MG tablet Take 1 tablet (50 mg) by mouth At Bedtime  Qty: 90 tablet, Refills: 3    Comments: Take with 1 10 mg tab at HS.  Associated Diagnoses: Headache disorder      azelastine (ASTELIN) 0.1 % nasal spray Spray 1 spray into both nostrils 2 times daily  Qty: 1 Bottle, Refills: 11    Associated Diagnoses: Seasonal allergies      blood glucose (PAT CONTOUR NEXT) test strip Use to test blood sugar 6 times daily or as directed.  Qty: 2 Box, Refills: 11    Associated Diagnoses: Post-pancreatectomy diabetes (H)      blood glucose monitoring (PAT MICROLET) lancets Use to test blood sugar 6-8 times daily or as directed.  Qty: 100 each, Refills: 11    Associated Diagnoses: Acute on chronic pancreatitis (H)      cetirizine (ZYRTEC) 10 MG tablet Take 1 tablet (10 mg) by mouth daily  Qty: 90 tablet, Refills: 3    Associated Diagnoses: Elevated liver enzymes      Continuous Blood Gluc Sensor (FREESTYLE LISA 2 SENSOR) MISC 1 each every 14 days  Qty: 2 each, Refills: 11    Associated Diagnoses: Post-pancreatectomy diabetes (H)      cyclobenzaprine (FLEXERIL) 10 MG tablet Take 1 tablet (10 mg) by mouth 2 times daily as needed for muscle spasms  Qty: 60 tablet, Refills: 3    Associated Diagnoses: Gastroparesis; Abdominal pain, generalized       diphenhydrAMINE (BENADRYL ALLERGY) 25 MG capsule Take 1 capsule (25 mg) by mouth every 6 hours as needed for itching or allergies  Qty: 56 capsule, Refills: 0    Associated Diagnoses: Itching; Post-pancreatectomy diabetes (H); History of pancreatectomy; Pancreatic insufficiency      estradiol (VAGIFEM) 10 MCG TABS vaginal tablet INSERT 1 TABLET(10 MCG) VAGINALLY 2 TIMES A WEEK  Qty: 24 tablet, Refills: 2    Associated Diagnoses: Atrophic vaginitis      famotidine (PEPCID) 20 MG tablet Take 1 tablet (20 mg) by mouth 2 times daily  Qty: 180 tablet, Refills: 3    Associated Diagnoses: Gastroesophageal reflux disease without esophagitis      Glucagon (GVOKE HYPOPEN 1-PACK) 1 MG/0.2ML SOAJ Inject 1 mg Subcutaneous once as needed (hypoglycemia with loss of consciousness)  Qty: 0.2 mL, Refills: 1    Associated Diagnoses: Post-pancreatectomy diabetes (H)      glucose 40 % GEL gel Take 15-30 g by mouth every 15 minutes as needed for low blood sugar  Qty: 3 Tube, Refills: 2    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      ibuprofen (ADVIL/MOTRIN) 400 MG tablet Take 1 tablet (400 mg) by mouth every 6 hours as needed for moderate pain  Qty: 30 tablet, Refills: 0    Associated Diagnoses: Acute post-operative pain      levothyroxine (SYNTHROID/LEVOTHROID) 137 MCG tablet Take 1 tablet (137 mcg) by mouth daily  Qty: 90 tablet, Refills: 3    Associated Diagnoses: Hypothyroidism, unspecified type      montelukast (SINGULAIR) 10 MG tablet TAKE 1 TABLET(10 MG) BY MOUTH AT BEDTIME  Qty: 90 tablet, Refills: 3    Associated Diagnoses: Seasonal allergies      omeprazole (PRILOSEC) 40 MG DR capsule Take 1 capsule (40 mg) by mouth 2 times daily  Qty: 180 capsule, Refills: 3    Associated Diagnoses: Gastroesophageal reflux disease without esophagitis      order for DME Equipment being ordered:Cefaly  Qty: 1 Device, Refills: 0    Associated Diagnoses: Headache disorder      polyethylene glycol (MIRALAX) 17 GM/Dose powder  Take 17 g (1 capful) by mouth daily  Qty: 507 g, Refills: 11    Associated Diagnoses: Slow transit constipation      prochlorperazine (COMPAZINE) 5 MG tablet Take 1 tablet (5 mg) by mouth every 6 hours as needed Take two 5 mg tablets by mouth every six hours as needed for nausea.  Qty: 60 tablet, Refills: 1    Associated Diagnoses: History of pancreatectomy; Pancreatic insufficiency      promethazine (PHENERGAN) 25 MG tablet Take 1 tablet (25 mg) by mouth daily as needed  Qty: 60 tablet, Refills: 1    Associated Diagnoses: Nausea      Prucalopride Succinate (MOTEGRITY) 2 MG TABS Take 2 mg by mouth daily  Qty: 30 tablet, Refills: 11    Associated Diagnoses: Chronic idiopathic constipation      scopolamine (TRANSDERM) 1 MG/3DAYS 72 hr patch Place 1 patch onto the skin every 72 hours  Qty: 10 patch, Refills: 11    Associated Diagnoses: Slow transit constipation      senna-docusate (SENOKOT-S;PERICOLACE) 8.6-50 MG per tablet Take 1-2 tablets by mouth 2 times daily  Qty: 100 tablet, Refills: 0    Associated Diagnoses: Incisional hernia, without obstruction or gangrene      Sharps Container (BD SHARPS ) MISC 1 Container as needed  Qty: 1 each, Refills: 1    Associated Diagnoses: Post-pancreatectomy diabetes (H)      ZOLMitriptan (ZOMIG) 5 MG tablet Take 1 tablet (5 mg) by mouth at onset of headache for migraine  Qty: 9 tablet, Refills: 11    Associated Diagnoses: Headache disorder       !! - Potential duplicate medications found. Please discuss with provider.        Allergies   Allergies   Allergen Reactions     Apple Anaphylaxis     Corticosteroids Other (See Comments)     All oral, IV and injectable steroids are contraindicated (unless in life threatening situations) in [employer] transplant recipients. They can cause irreversible loss of islet cell function. Please contact patient's transplant care coordinator ADI Gaffney RN at [phone]/pager [phone] and/or endocrinologist prior to  administration.       Depakote [Divalproex Sodium] Other (See Comments)     Chest pain     Zithromax [Azithromycin Dihydrate] Anaphylaxis     Noted in 4/7/08 ER     Bromfenac      Other reaction(s): Headache     Codeine      Other reaction(s): Hallucinations     Darvocet [Propoxyphene N-Apap] Itching     Hydromorphone Other (See Comments)     Loopy     Morphine Nausea and Vomiting and Rash     Nalbuphine Hives and Rash     Other reaction(s): Rash  Nubain       Nalbuphine Hcl Rash     RASH :nubaine     Zosyn [Piperacillin-Tazobactam In D5w] Rash     Possible allergy, did have a diffuse rash that seemed drug related but could have also been related to soap in the hospital.      Bactrim [Sulfamethoxazole W-Trimethoprim] Other (See Comments) and Nausea and Vomiting     Severely low liver function.     Cats      Compazine [Prochlorperazine] Other (See Comments)     Twitching. Takes Benedryl and is fine     Dust Mites      Grass      Ragweeds      Tape [Adhesive Tape] Blisters     Tramadol      Trees      Zofran [Ondansetron] Other (See Comments)     migraine     Flagyl [Metronidazole] Hives and Rash

## 2021-11-28 NOTE — PLAN OF CARE
Patient transported to [facility] @ ~0740 via hospital transport cart.

## 2021-11-28 NOTE — ANESTHESIA CARE TRANSFER NOTE
Patient: Dinora Mcghee    Procedure: Procedure(s):  Gastrojejonostomy placement with jejunopexy, PEG tube exchange       Diagnosis: Adult failure to thrive [R62.7]  Diagnosis Additional Information: No value filed.    Anesthesia Type:   General     Note:    Oropharynx: oropharynx clear of all foreign objects and spontaneously breathing  Level of Consciousness: drowsy  Oxygen Supplementation: nasal cannula  Level of Supplemental Oxygen (L/min / FiO2): 3  Independent Airway: airway patency satisfactory and stable  Dentition: dentition unchanged  Vital Signs Stable: post-procedure vital signs reviewed and stable  Report to RN Given: handoff report given  Patient transferred to: PACU  Comments: Anesthesia Care Transfer Note    Patient: Dinora Mcghee    Transferred to: PACU    Patient vital signs: stable    Airway: none    Monitors placed. VSS. PIV patent. 3L 02 NC. Patient awake, comfortable. Report given and care transferred to RN.     April Sharma CRNA   11/28/2021    Handoff Report: Identifed the Patient, Identified the Reponsible Provider, Reviewed the pertinent medical history, Discussed the surgical course, Reviewed Intra-OP anesthesia mangement and issues during anesthesia, Set expectations for post-procedure period and Allowed opportunity for questions and acknowledgement of understanding      Vitals:  Vitals Value Taken Time   BP     Temp     Pulse 80 11/28/21 1008   Resp     SpO2 100 % 11/28/21 1008   Vitals shown include unvalidated device data.    Electronically Signed By: NATALY Trimble CRNA  November 28, 2021  10:09 AM

## 2021-11-28 NOTE — PLAN OF CARE
"OBSERVATION GOALS:    - Diagnostic tests and consults completed and resulted:  Not met  - Vital signs normal or at patient baseline:  Met; VSS on RA  - Tolerating oral intake to maintain hydration:  Pt NPO   - Adequate pain control on oral analgesics:  In progress  - Returns to baseline functional status:  Not met  - GI consult completed with dispo plan:  Not met    Pt transported to [facility] for J placement.  Report given to [facility] RN.     Nurse to notify provider when observation goals have been met and patient is ready for discharge.    /79   Pulse 72   Temp 98.7  F (37.1  C) (Oral)   Resp 16   Ht 1.702 m (5' 7\")   Wt 60.3 kg (133 lb)   SpO2 97%   BMI 20.83 kg/m      "

## 2021-11-28 NOTE — ANESTHESIA PROCEDURE NOTES
Airway       Patient location during procedure: OR       Procedure Start/Stop Times: 11/28/2021 8:49 AM  Staff -        CRNA: April Sam APRN CRNA       Performed By: CRNA  Consent for Airway        Urgency: elective  Indications and Patient Condition       Indications for airway management: andrez-procedural        Final Airway Details       Final airway type: endotracheal airway       Successful airway: ETT - single  Endotracheal Airway Details        ETT size (mm): 7.0       Cuffed: yes       Successful intubation technique: direct laryngoscopy       DL Blade Type: Pino 2       Grade View of Cords: 1       Adjucts: stylet       Position: Right       Measured from: gums/teeth       Secured at (cm): 22       Bite Block used: Bite block for ENDO scope in place.    Post intubation assessment        Placement verified by: capnometry, equal breath sounds and chest rise        Number of attempts at approach: 1       Secured with: pink tape       Ease of procedure: easy       Dentition: Intact and Unchanged

## 2021-11-28 NOTE — PLAN OF CARE
Pt. Arrived to PACU sleeping soundly. Denies nausea. Sleeping/snoring between cares. 10/10 pain in LUQ when awake. Dr. Montoya and Dr. Cavanaugh at bedside for short period during PACU stay. Dr. Cavanaugh will address sign-out. Patient OK to go back into Observation status and try PO pain medications, discharge later today if pain acceptable. AVSS.

## 2021-11-28 NOTE — ANESTHESIA PREPROCEDURE EVALUATION
Anesthesia Pre-Procedure Evaluation    Patient: Dinora Mcghee   MRN: [id number] : 1965        Preoperative Diagnosis: Malfunction of gastrostomy tube (H) [K94.23]   Procedure : Procedure(s):  REPLACEMENT, GASTROJEJUNOSTOMY TUBE, PERCUTANEOUS     Past Medical History:   Diagnosis Date     Allergic rhinitis, cause unspecified      Allergy to other foods     strawberries, apples, celeries, alice, watermelon     Arthritis     left knee     Choledocholithiasis     long after cholecystectomy     Chronic abdominal pain      Chronic constipation      Chronic nausea      Chronic pancreatitis (H)      Degeneration of lumbar or lumbosacral intervertebral disc      Esophageal reflux     w/ hiatal hernia     Gastroparesis      Hiatal hernia      History of pituitary adenoma     s/p resection     Hypothyroidism      Migraines      Mild hyperlipidemia      On tube feeding diet     presence of GJ tube     Pancreatic disease     PD stricture, 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 endometriosis growth     APPENDECTOMY       C  DELIVERY ONLY       C  DELIVERY ONLY      repeat c section with incidental cystotomy with repair     C EXCIS PITUITARY,TRANSNASAL/SEPTAL      pituitary tumor removed for diabetes insipidus     C TOTAL ABDOM HYSTERECTOMY      w/ bilateral salpingoophorectomy       SECTION       COLONOSCOPY       ENDOSCOPIC RETROGRADE CHOLANGIOPANCREATOGRAM N/A 2015    Procedure: ENDOSCOPIC RETROGRADE CHOLANGIOPANCREATOGRAM;  Surgeon: Mandeep Park MD;  Location: [facility]     ENDOSCOPIC RETROGRADE CHOLANGIOPANCREATOGRAM N/A 2016    Procedure: COMBINED ENDOSCOPIC RETROGRADE CHOLANGIOPANCREATOGRAPHY, PLACE  TUBE/STENT;  Surgeon: Mandeep Park MD;  [facility]: [facility]     ENDOSCOPIC RETROGRADE CHOLANGIOPANCREATOGRAM N/A 3/17/2016    Procedure: COMBINED ENDOSCOPIC RETROGRADE CHOLANGIOPANCREATOGRAPHY, REMOVE FOREIGN BODY OR STENT/TUBE;  Surgeon: Mandeep Park MD;  [facility]: [facility]     ENDOSCOPIC RETROGRADE CHOLANGIOPANCREATOGRAM N/A 8/2/2016    Procedure: COMBINED ENDOSCOPIC RETROGRADE CHOLANGIOPANCREATOGRAPHY, PLACE TUBE/STENT;  Surgeon: Mandeep Park MD;  [facility]     ENDOSCOPIC RETROGRADE CHOLANGIOPANCREATOGRAM N/A 8/26/2016    Procedure: COMBINED ENDOSCOPIC RETROGRADE CHOLANGIOPANCREATOGRAPHY, REMOVE FOREIGN BODY OR STENT/TUBE;  Surgeon: Mandeep Park MD;  [facility]     ENDOSCOPIC ULTRASOUND UPPER GASTROINTESTINAL TRACT (GI) N/A 10/3/2016    Procedure: ENDOSCOPIC ULTRASOUND, ESOPHAGOSCOPY / UPPER GASTROINTESTINAL TRACT (GI);  Surgeon: Guru Jose Rodas MD;  [facility]     ESOPHAGOSCOPY, GASTROSCOPY, DUODENOSCOPY (EGD), COMBINED N/A 6/24/2015    Procedure: COMBINED ESOPHAGOSCOPY, GASTROSCOPY, DUODENOSCOPY (EGD), REMOVE FOREIGN BODY;  Surgeon: Mandeep Park MD;  [facility]     ESOPHAGOSCOPY, GASTROSCOPY, DUODENOSCOPY (EGD), COMBINED N/A 10/25/2015    Procedure: COMBINED ESOPHAGOSCOPY, GASTROSCOPY, DUODENOSCOPY (EGD);  Surgeon: Sammy Amaro MD;  [facility]     ESOPHAGOSCOPY, GASTROSCOPY, DUODENOSCOPY (EGD), COMBINED N/A 10/25/2015    Procedure: COMBINED ESOPHAGOSCOPY, GASTROSCOPY, DUODENOSCOPY (EGD), BIOPSY SINGLE OR MULTIPLE;  Surgeon: Sammy Amaro MD;  [facility]     ESOPHAGOSCOPY, GASTROSCOPY, DUODENOSCOPY (EGD), DILATATION, COMBINED       EXCISE LESION TRUNK N/A 4/17/2017    Procedure: EXCISE LESION TRUNK;  Removal of Abdominal Foreign Body;  Surgeon: Nestor Phoenix MD;  [facility]     HC ESOPH/GAS REFLUX TEST W NASAL IMPED >1 HR N/A 11/19/2015    Procedure: ESOPHAGEAL IMPEDENCE FUNCTION TEST WITH 24  HOUR PH GREATER THAN 1 HOUR;  Surgeon: Thiago Apple MD;  Location: [facility]     HC UGI ENDOSCOPY DIAG W BIOPSY  9/17/08     HC UGI ENDOSCOPY DIAG W BIOPSY  9/27/12     HC UGI ENDOSCOPY W ESOPHAGEAL DILATION BALLOON <30MM  9/17/08     HC UGI ENDOSCOPY W EUS N/A 5/5/2015    Procedure: COMBINED ENDOSCOPIC ULTRASOUND, ESOPHAGOSCOPY, GASTROSCOPY, DUODENOSCOPY (EGD);  Surgeon: Wm Dueñas MD;  Location: [facility]     HC WRIST ARTHROSCOP,RELEASE XVERS LIG Bilateral 12/17/08     INJECT TRANSVERSUS ABDOMINIS PLANE (TAP) BLOCK BILATERAL Left 9/22/2016    Procedure: INJECT TRANSVERSUS ABDOMINIS PLANE (TAP) BLOCK BILATERAL;  Surgeon: Dickson Corrigan MD;  [facility]     laparoscopic pnieda  1995     LAPAROSCOPIC HERNIORRHAPHY INCISIONAL N/A 8/23/2018    Procedure: LAPAROSCOPIC HERNIORRHAPHY INCISIONAL;  Laparoscopic Incisional Hernia Repair with Symbotex Mesh Implant;  Surgeon: Nestor Phoenix MD;  [facility]     LAPAROSCOPIC PANCREATECTOMY, TRANSPLANT AUTO ISLET CELL N/A 12/28/2016    Procedure: LAPAROSCOPIC PANCREATECTOMY, TRANSPLANT AUTO ISLET CELL;  Surgeon: Nestor Phoenix MD;  [facility]     REPLACE GASTROJEJUNOSTOMY TUBE, PERCUTANEOUS N/A 9/7/2021    Procedure: GASTROJEJUNOSTOMY TUBE PLACEMENT, PERCUTANEOUS, WITH GASTROPEXY;  Surgeon: Mandeep Park MD;  [facility]     REPLACE GASTROJEJUNOSTOMY TUBE, PERCUTANEOUS N/A 9/22/2021    Procedure: REPLACEMENT, GASTROJEJUNOSTOMY TUBE, PERCUTANEOUS;  Surgeon: Zackery Montoya MD;  [facility]     REPLACE JEJUNOSTOMY TUBE, PERCUTANEOUS N/A 9/10/2021    Procedure: UPPER ENDOSCOPY, REPLACEMENT OF PERCUTANEOUS GASTROJEJUNOSTOMY TUBE, T-TAG GASTROPEXY;  Surgeon: Zackery Montoya MD;  Location: [facility]     transphenoidal pituitary resection  1990      Allergies   Allergen Reactions     Apple Anaphylaxis     Corticosteroids Other (See Comments)     All oral, IV and injectable steroids are contraindicated (unless in life threatening  situations) in [facility] [employer] transplant recipients. They can cause irreversible loss of islet cell function. Please contact patient's transplant care coordinator ADI Gaffney RN at [phone]/pager [phone] and/or endocrinologist prior to administration.       Depakote [Divalproex Sodium] Other (See Comments)     Chest pain     Zithromax [Azithromycin Dihydrate] Anaphylaxis     Noted in 08 ER     Darvocet [Propoxyphene N-Apap] Itching     Morphine Nausea and Vomiting and Rash     Nalbuphine Hcl Rash     RASH :nubaine     Zosyn [Piperacillin-Tazobactam In D5w] Rash     Possible allergy, did have a diffuse rash that seemed drug related but could have also been related to soap in the hospital.      Bactrim [Sulfamethoxazole W-Trimethoprim] Other (See Comments) and Nausea and Vomiting     Severely low liver function.     Cats      Compazine [Prochlorperazine] Other (See Comments)     Twitching. Takes Benedryl and is fine     Dust Mites      Grass      Ragweeds      Tape [Adhesive Tape] Blisters     Tramadol      Trees      Zofran [Ondansetron] Other (See Comments)     migraine     Flagyl [Metronidazole] Hives and Rash      Social History     Tobacco Use     Smoking status: Former Smoker     Packs/day: 0.50     Years: 6.00     Pack years: 3.00     Types: Cigarettes     Start date: 1985     Quit date: 1992     Years since quittin.9     Smokeless tobacco: Never Used     Tobacco comment: no 2nd hand   Substance Use Topics     Alcohol use: Not Currently     Alcohol/week: 3.0 - 6.0 standard drinks     Types: 1 - 2 Glasses of wine, 1 - 2 Cans of beer, 1 - 2 Shots of liquor per week     Comment: none since IGG      Wt Readings from Last 1 Encounters:   21 64 kg (141 lb)        Anesthesia Evaluation   Pt has had prior anesthetic. Type: General.    History of anesthetic complications  - PONV.  (responds well to scopolamine patch).    ROS/MED HX  ENT/Pulmonary: Comment: 3 pk yr hx, quit     (+)  allergic rhinitis, tobacco use, Past use,  (-) asthma and sleep apnea   Neurologic:     (+) migraines,  (-) no seizures and no CVA   Cardiovascular:     (+) -----Previous cardiac testing   Echo: Date: Results:    Stress Test: Date: Results:    ECG Reviewed: Date: 8/7/21 Results:  Sinus rhythm  Cannot rule out Anterior infarct , age undetermined  Abnormal ECG   Ventricular rate 66 bpm     Cath: Date: Results:      METS/Exercise Tolerance: 4 - Raking leaves, gardening Comment: Endorses SOB when carrying laundry upstairs. Denies CP. +Fatigue, less stamina 2/2 malnutrition     Hematologic:  - neg hematologic  ROS  (-) history of blood clots and history of blood transfusion   Musculoskeletal: Comment: lumbago, chrondromalacia, stiff shoulder  - neg musculoskeletal ROS     GI/Hepatic: Comment: Hx hepatic dome lesion/IgG4 pseudotumor and elevated LFT's of unclear etiology    Chronic pancreatitis    Severe gastroparesis    Malnutrition, wt loss  Hiatal hernia      (+) GERD, Asymptomatic on medication, hiatal hernia, cholecystitis/cholelithiasis,     Renal/Genitourinary:  - neg Renal ROS     Endo: Comment: post-pancreatectomy diabetes; pancreatic insufficiency.     (+) thyroid problem, hypothyroidism,  (-) Type II DM   Psychiatric/Substance Use:  - neg psychiatric ROS     Infectious Disease:  - neg infectious disease ROS     Malignancy:  - neg malignancy ROS (+) Malignancy (pituitary adenoma), History of Neuro.    Other:  - neg other ROS          Physical Exam    Airway        Mallampati: I    Neck ROM: full     Respiratory Devices and Support         Dental  no notable dental history         Cardiovascular   cardiovascular exam normal          Pulmonary   pulmonary exam normal                OUTSIDE LABS:  CBC:   Lab Results   Component Value Date    WBC 5.5 11/27/2021    WBC 6.7 09/24/2021    HGB 13.6 11/27/2021    HGB 14.3 09/24/2021    HCT 41.3 11/27/2021    HCT 43.1 09/24/2021     11/27/2021     (H)  09/24/2021     BMP:   Lab Results   Component Value Date     11/27/2021     09/24/2021    POTASSIUM 3.9 11/27/2021    POTASSIUM 4.1 09/24/2021    CHLORIDE 106 11/27/2021    CHLORIDE 101 09/24/2021    CO2 29 11/27/2021    CO2 31 09/24/2021    BUN 12 11/27/2021    BUN 11 09/24/2021    CR 0.68 11/27/2021    CR 0.58 09/24/2021    GLC 88 11/27/2021    GLC 82 10/19/2021     COAGS:   Lab Results   Component Value Date    PTT 29 01/07/2017    INR 1.13 07/20/2021    FIBR 225 12/28/2016     POC:   Lab Results   Component Value Date    BGM 85 08/24/2018    HCG Negative 12/19/2016     HEPATIC:   Lab Results   Component Value Date    ALBUMIN 3.7 11/27/2021    PROTTOTAL 7.3 11/27/2021    ALT 32 11/27/2021    AST 21 11/27/2021    ALKPHOS 92 11/27/2021    BILITOTAL 0.6 11/27/2021     OTHER:   Lab Results   Component Value Date    PH 7.49 (H) 12/28/2016    LACT 0.7 09/23/2021    A1C 5.5 09/18/2021    KASSI 9.1 11/27/2021    PHOS 3.9 09/23/2021    MAG 2.0 09/23/2021    LIPASE 12 (L) 09/18/2021    AMYLASE 45 01/23/2017    TSH 0.67 09/16/2021    T4 1.13 03/23/2018    CRP 90.0 (H) 12/29/2016    SED 10 09/16/2021       Anesthesia Plan    ASA Status:  3   NPO Status:  NPO Appropriate    Anesthesia Type: General.     - Airway: ETT   Induction: Intravenous, Propofol.   Maintenance: Balanced.        Consents    Anesthesia Plan(s) and associated risks, benefits, and realistic alternatives discussed. Questions answered and patient/representative(s) expressed understanding.    - Discussed:     - Discussed with:  Patient      - Extended Intubation/Ventilatory Support Discussed: No.      - Patient is DNR/DNI Status: No    Use of blood products discussed: No .     Postoperative Care    Pain management: IV analgesics.   PONV prophylaxis: Ondansetron (or other 5HT-3), Dexamethasone or Solumedrol, Scopolamine patch     Comments:                   Arsen Angulo MD  Anesthesiology Resident, CA-1

## 2021-11-28 NOTE — PLAN OF CARE
"OBSERVATION GOALS:      - Diagnostic tests and consults completed and resulted:  Met  - Vital signs normal or at patient baseline:  Met; VSS on RA  - Tolerating oral intake to maintain hydration:  In progress  - Adequate pain control on oral analgesics:  In progress; reported pain on site.  Dilaudid administered with decrease in pain.  - Returns to baseline functional status:  In progress  - GI consult completed with dispo plan:  Met    Ambulated halls with spouse.  Slept in-between cares.  Supplies provided to pt.  Abdomen examination WDL; BS normoactive/audible.  Abdominal discomfort on LLQ, with dilaudid administered.  No other concerns from pt.  Pt stated feeling ready for discharge.     Nurse to notify provider when observation goals have been met and patient is ready for discharge.    /80 (BP Location: Right arm)   Pulse 76   Temp 97.6  F (36.4  C) (Oral)   Resp 18   Ht 1.702 m (5' 7\")   Wt 60.3 kg (133 lb)   SpO2 97%   BMI 20.83 kg/m      "

## 2021-11-28 NOTE — ANESTHESIA POSTPROCEDURE EVALUATION
Patient: Dinora Mcghee    Procedure: Procedure(s):  Gastrojejonostomy placement with jejunopexy, PEG tube exchange       Diagnosis:Adult failure to thrive [R62.7]  Diagnosis Additional Information: No value filed.    Anesthesia Type:  General    Note:  Disposition: Inpatient   Postop Pain Control: Uneventful            Sign Out: Well controlled pain   PONV: No   Neuro/Psych: Uneventful            Sign Out: Acceptable/Baseline neuro status   Airway/Respiratory: Uneventful            Sign Out: Acceptable/Baseline resp. status   CV/Hemodynamics: Uneventful            Sign Out: Acceptable CV status; No obvious hypovolemia; No obvious fluid overload   Other NRE: NONE   DID A NON-ROUTINE EVENT OCCUR? No           Last vitals:  Vitals Value Taken Time   BP 95/59 11/28/21 1030   Temp 36  C (96.8  F) 11/28/21 1015   Pulse 67 11/28/21 1039   Resp 12 11/28/21 1020   SpO2 100 % 11/28/21 1039   Vitals shown include unvalidated device data.    Electronically Signed By: Yusef Cavanaugh MD  November 28, 2021  10:40 AM

## 2021-11-28 NOTE — OP NOTE
Upper GI Endoscopy 11/28/2021  8:02 AM [address] [place] [address] [phone]     Endoscopy Department   _______________________________________________________________________________   Patient Name: Dinora Larson           Procedure Date: 11/28/2021 8:02 AM   MRN: [id number]                       Account Number: [id number]   YOB: 1965              Admit Type: Inpatient   Age: 56                                Gender: Female   Note Status: Finalized                Attending MD: Zackery Montoya MD   Pause for the Cause: pause for cause completed Total Sedation Time:   _______________________________________________________________________________       Procedure:             Upper GI endoscopy   Indications:           Place PEJ, Gastroparesis   Providers:             Zackery Montoya MD, Raeann Gasca   Patient Profile:       Ms Larson is a 57yo woman post TPIAT with course                          complicated by gastroparesis who has continued issues                          with her jejunal extension of her GJ tube which she                          requires for feeds and hydration. She now returns for                          placement of a jejunostomy tube with exchange of her                          GJ (across a gastrostomy) to a low profile device.   Referring MD:          Mandeep Park MD   Medicines:             General Anesthesia, Ancef 2000 mg IV   Complications:         No immediate complications; a small amount of free gas                          was seen above the spleen though this is anticipated   _______________________________________________________________________________   Procedure:             Pre-Anesthesia Assessment:                          - Prior to the procedure, a History and Physical was                          performed, and patient medications and allergies were                           reviewed. The patient is competent. The risks and                          benefits of the procedure and the sedation options and                          risks were discussed with the patient. All questions                          were answered and informed consent was obtained.                          Patient identification and proposed procedure were                          verified by the nurse in the pre-procedure area.                          Mental Status Examination: alert and oriented. Airway                          Examination: Mallampati Class II (the uvula but not                          tonsillar pillars visualized). Respiratory                          Examination: clear to auscultation. CV Examination:                          normal. ASA Grade Assessment: III - A patient with                          severe systemic disease. After reviewing the risks and                          benefits, the patient was deemed in satisfactory                          condition to undergo the procedure. The anesthesia                          plan was to use general anesthesia. Immediately prior                          to administration of medications, the patient was                          re-assessed for adequacy to receive sedatives. The                          heart rate, respiratory rate, oxygen saturations,                          blood pressure, adequacy of pulmonary ventilation, and                          response to care were monitored throughout the                          procedure. The physical status of the patient was                          re-assessed after the procedure. After obtaining                          informed consent, the endoscope was passed under                          direct vision. Throughout the procedure, the patient's                          blood pressure, pulse, and oxygen saturations were                          monitored continuously. The  pediatric colonoscope was                          introduced through the and advanced to the. The upper                          GI endoscopy was accomplished without difficulty. The                          patient tolerated the procedure well.                                                                                     Findings:        The patient was supine throughout.  films of the abdomen        demonstrated the jejunal extension curled and kinked in the proximal        jejunum (basically no duodenum). This balloon of the existing tube was        deflated and the tube removed demonstrating a mature gastrostomy tract.        An 18F 2.0c MK low profile gastrostomy tube was then passed across this        track and the balloon inflated with 6cc of water. The fit was mildly        snug. A peditric colonoscope was passed per os demonstrating an        unremarkable esophagus with the squamocolumnar line seen at the        gastroesophageal junction without significant irregularity. The stomach        was intact and the balloon from the gastrostpmy was visualized. There        was a tight turn at a patent duodenojejunostomy and the endoscope was        advanced to a healthy jejunojejunostomy with two widely patent limbs.        The biliary limb was first traversed and the colonoscope was then        withdrawn to the JJ and the commom feeding limb entered. The endoscope        was passed until we appreciated transillumination. The jejunum was        insufflated to appose jejunal and abdominal walls. The abdominal wall        was prepped and draped in a sterile manner. A snare was passed through        the endoscope and opened in the jejunal lumen. A site was located in the        mid-jejunum with excellent fluoroscopy. The abdominal wall was marked        and anesthetized with 1 mL of 1% lidocaine. The trocar needle was passed        through the abdominal wall and into the jejunum under both fluoroscopic         and direct endoscopic view. A guide wire was passed through the trocar        and into the open snare. The snare was closed around the guide wire. The        endoscope and snare were removed, pulling the wire out through the        mouth. A skin incision was made at the site of needle insertion. 24 Fr        jejunostomy tube was lubricated. The J-tube was passed over the        guidewire through the mouth and into the jejunum. The trocar needle was        removed and the jejunostomy tube was pulled out from the jejunum through        the skin. The guide wire was removed and the external bumper attached to        the jejunostomy tube. The feeding tube was then cut to an appropriate        length. The final position of the jejunostomy tube was confirmed by        relook endoscopy, and skin marking noted to be 2 cm at the external        bumper. Next we passed the pediatric colonoscope back to the jejunostomy        and passed a single T tag along the fresh jejunostomy to create a        jejunopexy. The feeding tube was capped, and the tube site cleaned and        dressed.                                                                                     Impression:            -  films demonstrating kinks and curls of the                          jejunal extension within the proxima jejunum                          - Uncomplicated exchange of the existing                          malfunctioning GJ across the gastrostomy with an 18F                          2.0cm low profile Teresa gastrostomy tube                          - As previous, there was evidence of a widely patent                          duodenojejunostomy with healthy and widely patenet                          jejunojejunostomy                          - Identification of the biliary limb to allow correct                          positioning of the jejunostomy tube within the common                          feeding limb                          -  Uncomplicated placement of a 24F en-fit feeding tube                          to the mid jejunum downstream of the jejunojejunostomy                          with adjunct single tag jejunopexy as above                          - GASTROSTOMY IS RIGHT UPPER QUADRANT, JEJUNOSTOMY IS                          LEFT LOWER/MID QUADRANT   Recommendation:        - General anesthesia recovery with return to the floor                          when appropriate                          - Abdominal pain is expected at the site and this                          should slowly improve over the next 24-48h; with any                          signs of complication such as increasing abdominal                          pain or bleeding, contact our team without delay                          - Serial abdominal exams at 2h and 4h; without signs                          of complication, the jejuostomy tubemay be used for                          hydration and tube feeds                          - Low profile gastrostomy tube may be used for                          decompression (gravity bag) without delay (if desired)                          - Patient may be discharged once pain is controlled                          with orals and the patient is tolerating feeds and                          hydation per the tube                          - The findings and recommendations were discussed with                          the patient and their family                                                                                       electronically signed by ANDREI Montoya

## 2021-11-28 NOTE — H&P
[facility]    History and Physical - ED Observation Service       Date of Admission:  11/27/2021    Assessment & Plan      Dinora Mcghee is a 56 year old female admitted on 11/27/2021. 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     ##. Acute on Chronic Abdominal Pain    ##. Clogged GJ Tube  ##. Gastroparesis  ##. Chronic pancreatitis s/p TPAIT  ##. GJ tube with dependence on enteral feeding  States G-J has not been functional since 2PM yesterday. Has had trouble with it x last 3 weeks intermittently. All measures with declogging tube unsuccessful this time. Reports LUQ pain, acute on chronic nausea. Denies fever, chills, vomiting, dysuria, frequency. GI team asked patient to present to ED. Uses 3 cans of [employer] Peptide formula/day x16 hours usually starting at 6am. States she has been playing with times of feeds due to erratic blood glucose. Typically has Yandy but does not have it on now. Takes very minimal by mouth. Last GJ Tube replacement on 10/19/21 with Dr. Park. Seen by Dr. Genao 11/3/21 who recommended scheduling CTA to evaluate for mesenteric ischemia in post-surgical abdomen and continuing w/ scopolamine, prn compazine for breakthrough nausea, hydration efforts, IVF if not tolerating orals, current bowel regimen.   Per GI visit w/ Dr. Park 11/15/21 discussed option of  J tube for feeding, and G tube for venting to allow  better physical and social activity and function. At that time it was recommended to decrease tube flushing at night, to at least every 5 hours, could extend to 6-7 hour interval at night, then resume q 3 hrs during awake hours to avoid sleep deprivation; and plan to ask Dr Montoya to consider BOTOX plyorus, while changing to separate G and J low profile. In ED, HR 80's-90's, -125/56-88, RR 16-18, SaO2 98% on RA, Temp 98.2. Labs show normal CMP, CBC. Covid 19 PCR negative. AXR reports percutaneous gastrojejunostomy tube tip terminates over the proximal jejunum, though there is a 5 cm length of the catheter tubing proximally that appears redundant, coiled, and possibly twisted, moderate amount  of formed colonic stool; no visible small bowel dilatation; numerous surgical clips in the upper abdomen. In the ED the patient was given 500ml NS bolus.   - GI consult  - NPO at midnight  - MIVF with LR 125ml/hr  - Protonix 40mg IV BID  - Pepcid 20mg IV BID per home regimen  - BG checks q4h  - Hypoglycemic protocol  - Continue PTA Compazine or Phenergan prn nausea and pretreat with Benadryl  - Continue PTA Scopolamine patch  - Nutrition consult for resuming GJ feeds after procedure    ##. Chronic constipation:  AXR reports moderate amount of formed colonic stool; no visible small bowel dilatation  - Increase PTA Miralax to TID  - Continue Senna-colace 2 tabs BID  - Continue PTA Motegrity 2 mg daily   - Defer to GI for additional recommendations     --- CHRONIC ISSUES ---   ##. History of IgG4 pseudotumor: Mild LFT abnormality with mildly elevated IgG4. Liver lesion previously biopsied with noticeable proliferation and acute and chronic inflammation and fibrosis of liver with focally increased IgG4-positive plasma cells consistent with IgG4 pseudotumor. Followed by hepatology.   - Outpatient follow up with GI      ##. GERD: - Resume famotidine po 20 mg BID, omeprazole po 40 mg BID at discharge. Will use IV Pepcid and  "Pantoprazole while admitted     ##. RAD  ##. Allergic rhinitis  - Continue Cetrizine 10 mg daily  - Continue Benadryl 25 mg Q6H PRN  - Resume PTA montelukast 10 mg daily at discharge. Patient does not want this ordered here     ##. Hypothyroidism: last TSH  0.67 on 9/16/121  - Continue PTA Levothyroxine 137 mcg daily      ##. History of Migraine HA: - Continue PTA Amitriptyline 60 mg at bedtime  - Continue Zolmitriptan 5 mg as needed at onset of headache     ##. Depression: Attempted Mirtazapine at last admission, however patient reported \"loopiness\" the second morning after taking it so it was discontinued. Avoid mirtazapine in the future. Follows with OP Psychologist.   -Continue PTA Amitryptyline      Diet: NPO for Medical/Clinical Reasons Except for: Ice Chips, Meds  DVT Prophylaxis: Ambulate every shift  Piedra Catheter: Not present  Central Lines: None  Code Status: Full Code    Clinically Significant Risk Factors Present on Admission                   Disposition Plan   Expected discharge:  recommended to prior living arrangement once adequate pain management/ tolerating PO medications, safe disposition plan/ TCU bed available and GI consult completed with dispo plan.     The patient's care was discussed with the Attending Physician, Dr. Garibay.    ANA Ortez  [facility]  Securely message with the Vocera Web Console (learn more here)  Text page via [facility] Paging/Directory        ______________________________________________________________________    Chief Complaint   Clogged GJ tube     History is obtained from the patient    History of Present Illness   Dinora Mcghee is a 56 year old female with past medical history of GERD, hypothyroidism, pituitary adenoma s/p resection, allergic rhinitis, migraines, hiatal hernia, lumbar DDD, IgG4 pseudotumor of liver, TPAIT w/choledochoduodenostomy, duodenojejunostomy, Vera-Y reconstruction (12/2016), " suspected gastroparesis and hypoglycemia, managed by a G-J tube placement, PEG tube replacement by percutaneous GJ tube (9/7/21) complicated by displacement of the GJ tube, replacement of PEG J-tube with T tag gastropexy (9/10/21), s/p replacement of clogged GJ Tube (10/19/21) who presented with clogged GJ tube.     States G-J has not been functional since 2PM yesterday. Though reports that she has had trouble with it for the last 3 weeks intermittently. This time she has tried all measures with declogging without success. Reports LUQ pain. Has chronic nausea. Denies fever, chills, vomiting, dysuria, frequency. She was asked by GI team to present to ED. Feed schedule entails ~3 cans of [employer] Peptide formula a day run over 16 hours usually starting at 6am. States she has been playing with times of feeds due to erratic blood glucose. She typically has  Yandy but does not have it on now. Takes very very minimal by mouth.     Per chart review, she presented 9/18/21 for worsening abdominal pain at enteric tube site. GJ tube found to be clogged and replaced by GI on 9/22/21. After exchange of tube and change of tube feed formulation to plant-based formulation, patient noted improvement in pain and was discharged home. During admission she was seen by Pain Service. Psychiatry was consulted for depression and recommended Remeron which patient did not tolerate.     She had an outpatient replacement of clogged GJ Tube on 10/19/21 with Dr. Park. She was seen by Dr. Genao on 11/3/21 who recommended CTA to evaluate for mesenteric ischemia in post-surgical abdomen done 8/24/21 which was negative. Per chart review was seen by Dr. Park on 11/15/21 at which time she reported waking every 3 hours to flush GJ, based on occlusion in past, leading to sleep deprivation. There was discussion at that time regarding option of  J tube for feeding, and G tube for venting to allow better physical and social activity and  function. At that time it was recommended to decrease tube flushing at night, to at least every 5 hours, could extend to 6-7 hour interval at night, then resume q 3 hrs during awake hours and plan to ask Dr Montoya to consider BOTOX anahi, while changing to separate G and J low profile.       Patient has had 3 doses of the Moderna Covid vaccine, last dose given on 9/16/2021.    In the ED, HR 80's-90's, -125/56-88, RR 16-18, SaO2 98% on RA, Temp 98.2. Labs show normal CMP, CBC. Covid 19 PCR negative. AXR reports percutaneous gastrojejunostomy tube tip terminates over the proximal jejunum, though there is a 5 cm length of the catheter tubing proximally that appears redundant, coiled, and possibly twisted, moderate amount  of formed colonic stool; no visible small bowel dilatation; numerous surgical clips in the upper abdomen. In the ED the patient was given 500ml NS bolus. She is being admitted for IV hydration and OR with GI in AM for replacement of GJ tube.     Review of Systems    All other ROS negative except those mentioned in above note.      Past Medical History    I have reviewed this patient's medical history and updated it with pertinent information if needed.   Past Medical History:   Diagnosis Date     Allergic rhinitis, cause unspecified      Allergy to other foods     strawberries, apples, celeries, alice, watermelon     Arthritis     left knee     Choledocholithiasis     long after cholecystectomy     Chronic abdominal pain      Chronic constipation      Chronic nausea      Chronic pancreatitis (H)      Degeneration of lumbar or lumbosacral intervertebral disc      Esophageal reflux     w/ hiatal hernia     Gastroparesis      Hiatal hernia      History of pituitary adenoma     s/p resection     Hypothyroidism      Migraines      Mild hyperlipidemia      On tube feeding diet     presence of GJ tube     Pancreatic disease     PD stricture, 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  Past Surgical History:   Procedure Laterality Date     ABDOMEN SURGERY      c sections: 93, 96, 98. endometriosis growth     APPENDECTOMY       C  DELIVERY ONLY       C  DELIVERY ONLY      repeat c section with incidental cystotomy with repair     C EXCIS PITUITARY,TRANSNASAL/SEPTAL      pituitary tumor removed for diabetes insipidus     C TOTAL ABDOM HYSTERECTOMY      w/ bilateral salpingoophorectomy       SECTION       COLONOSCOPY       ENDOSCOPIC RETROGRADE CHOLANGIOPANCREATOGRAM N/A 2015    Procedure: ENDOSCOPIC RETROGRADE CHOLANGIOPANCREATOGRAM;  Surgeon: Mandeep Park MD;  [facility]: [facility]     ENDOSCOPIC RETROGRADE CHOLANGIOPANCREATOGRAM N/A 2016    Procedure: COMBINED ENDOSCOPIC RETROGRADE CHOLANGIOPANCREATOGRAPHY, PLACE TUBE/STENT;  Surgeon: Mandeep Park MD;  [facility]: [facility]     ENDOSCOPIC RETROGRADE CHOLANGIOPANCREATOGRAM N/A 3/17/2016    Procedure: COMBINED ENDOSCOPIC RETROGRADE CHOLANGIOPANCREATOGRAPHY, REMOVE FOREIGN BODY OR STENT/TUBE;  Surgeon: Mandeep Park MD;  Location: [facility]     ENDOSCOPIC RETROGRADE CHOLANGIOPANCREATOGRAM N/A 2016    Procedure: COMBINED ENDOSCOPIC RETROGRADE CHOLANGIOPANCREATOGRAPHY, PLACE TUBE/STENT;  Surgeon: Mandeep Park MD;  [facility]     ENDOSCOPIC RETROGRADE CHOLANGIOPANCREATOGRAM N/A 2016    Procedure: COMBINED ENDOSCOPIC RETROGRADE CHOLANGIOPANCREATOGRAPHY, REMOVE FOREIGN BODY OR STENT/TUBE;  Surgeon: Mandeep Park MD;  [facility]     ENDOSCOPIC ULTRASOUND UPPER GASTROINTESTINAL TRACT (GI) N/A 10/3/2016    Procedure: ENDOSCOPIC ULTRASOUND, ESOPHAGOSCOPY / UPPER GASTROINTESTINAL TRACT (GI);   Surgeon: Guru Jose Rodas MD;  [facility]     ESOPHAGOSCOPY, GASTROSCOPY, DUODENOSCOPY (EGD), COMBINED N/A 6/24/2015    Procedure: COMBINED ESOPHAGOSCOPY, GASTROSCOPY, DUODENOSCOPY (EGD), REMOVE FOREIGN BODY;  Surgeon: Mandeep Park MD;  [facility]     ESOPHAGOSCOPY, GASTROSCOPY, DUODENOSCOPY (EGD), COMBINED N/A 10/25/2015    Procedure: COMBINED ESOPHAGOSCOPY, GASTROSCOPY, DUODENOSCOPY (EGD);  Surgeon: Sammy Amaro MD;  [facility]     ESOPHAGOSCOPY, GASTROSCOPY, DUODENOSCOPY (EGD), COMBINED N/A 10/25/2015    Procedure: COMBINED ESOPHAGOSCOPY, GASTROSCOPY, DUODENOSCOPY (EGD), BIOPSY SINGLE OR MULTIPLE;  Surgeon: Sammy Amaro MD;  Location: [facility]     ESOPHAGOSCOPY, GASTROSCOPY, DUODENOSCOPY (EGD), DILATATION, COMBINED       EXCISE LESION TRUNK N/A 4/17/2017    Procedure: EXCISE LESION TRUNK;  Removal of Abdominal Foreign Body;  Surgeon: Nestor Phoenix MD;  [facility]     HC ESOPH/GAS REFLUX TEST W NASAL IMPED >1 HR N/A 11/19/2015    Procedure: ESOPHAGEAL IMPEDENCE FUNCTION TEST WITH 24 HOUR PH GREATER THAN 1 HOUR;  Surgeon: Thiago Apple MD;  Location: [facility]     HC UGI ENDOSCOPY DIAG W BIOPSY  9/17/08     HC UGI ENDOSCOPY DIAG W BIOPSY  9/27/12     HC UGI ENDOSCOPY W ESOPHAGEAL DILATION BALLOON <30MM  9/17/08     HC UGI ENDOSCOPY W EUS N/A 5/5/2015    Procedure: COMBINED ENDOSCOPIC ULTRASOUND, ESOPHAGOSCOPY, GASTROSCOPY, DUODENOSCOPY (EGD);  Surgeon: Wm Dueñas MD;  Location: [facility]     HC WRIST ARTHROSCOP,RELEASE XVERS LIG Bilateral 12/17/08     INJECT TRANSVERSUS ABDOMINIS PLANE (TAP) BLOCK BILATERAL Left 9/22/2016    Procedure: INJECT TRANSVERSUS ABDOMINIS PLANE (TAP) BLOCK BILATERAL;  Surgeon: Dickson Corrigan MD;  [facility]     laparoscopic pineda  1995     LAPAROSCOPIC HERNIORRHAPHY INCISIONAL N/A 8/23/2018    Procedure: LAPAROSCOPIC HERNIORRHAPHY INCISIONAL;  Laparoscopic Incisional Hernia Repair with Symbotex Mesh  Implant;  Surgeon: Nestor Phoenix MD;  [facility]     LAPAROSCOPIC PANCREATECTOMY, TRANSPLANT AUTO ISLET CELL N/A 2016    Procedure: LAPAROSCOPIC PANCREATECTOMY, TRANSPLANT AUTO ISLET CELL;  Surgeon: Nestor Phoenix MD;  [facility]     REPLACE GASTROJEJUNOSTOMY TUBE, PERCUTANEOUS N/A 2021    Procedure: GASTROJEJUNOSTOMY TUBE PLACEMENT, PERCUTANEOUS, WITH GASTROPEXY;  Surgeon: Mandeep Park MD;  [facility]     REPLACE GASTROJEJUNOSTOMY TUBE, PERCUTANEOUS N/A 2021    Procedure: REPLACEMENT, GASTROJEJUNOSTOMY TUBE, PERCUTANEOUS;  Surgeon: Zackery Montoya MD;  [facility]     REPLACE JEJUNOSTOMY TUBE, PERCUTANEOUS N/A 9/10/2021    Procedure: UPPER ENDOSCOPY, REPLACEMENT OF PERCUTANEOUS GASTROJEJUNOSTOMY TUBE, T-TAG GASTROPEXY;  Surgeon: Zackery Montoya MD;  Location[facility]     transphenoidal pituitary resection         Social History   I have reviewed this patient's social history and updated it with pertinent information if needed.  Social History     Tobacco Use     Smoking status: Former Smoker     Packs/day: 0.50     Years: 6.00     Pack years: 3.00     Types: Cigarettes     Start date: 1985     Quit date: 1992     Years since quittin.9     Smokeless tobacco: Never Used     Tobacco comment: no 2nd hand   Substance Use Topics     Alcohol use: Not Currently     Alcohol/week: 3.0 - 6.0 standard drinks     Types: 1 - 2 Glasses of wine, 1 - 2 Cans of beer, 1 - 2 Shots of liquor per week     Comment: none since IGG     Drug use: No       Family History   I have reviewed this patient's family history and updated it with pertinent information if needed.  Family History   Problem Relation Age of Onset     Eye Disorder Father         cataract, detached retina     Myocardial Infarction Father 60     Lipids Father      Cerebrovascular Disease Father      Depression Father      Substance Abuse Father      Anesthesia Reaction Father         stroke right  after surgery     Cataracts Father      Osteoarthritis Father      Ulcerative Colitis Father      Lipids Mother      Hypertension Mother      Thyroid Disease Mother      Depression Mother      Angina Mother      GERD Mother      Skin Cancer Mother      Eye Disorder Son         ptosis     Eye Disorder Paternal Grandmother         cataract     Eye Disorder Paternal Grandfather         cataract     Diabetes Paternal Grandfather      Eye Disorder Maternal Grandmother         cataract     Thyroid Disease Maternal Grandmother      Coronary Artery Disease Sister         angioplasty     GERD Sister      Substance Abuse Sister      Depression Sister      Depression Son      Anxiety Disorder Son      Thyroid Disease Sister      Diabetes Maternal Grandfather      Depression Nephew      Anxiety Disorder Nephew      Thyroid Disease Nephew      Diabetes Type 2  Cousin         paternal cousin     Heart Disease Paternal Aunt      Diabetes Paternal Aunt      Diabetes Paternal Uncle      Heart Disease Paternal Uncle        Prior to Admission Medications   Prior to Admission Medications   Prescriptions Last Dose Informant Patient Reported? Taking?   Continuous Blood Gluc Sensor (FREESTYLE LISA 2 SENSOR) [facility]   No No   Si each every 14 days   Glucagon (GVOKE HYPOPEN 1-PACK) 1 MG/0.2ML SOAJ   No No   Sig: Inject 1 mg Subcutaneous once as needed (hypoglycemia with loss of consciousness)   Prucalopride Succinate (MOTEGRITY) 2 MG TABS   No No   Sig: Take 2 mg by mouth daily   Patient taking differently: Take 2 mg by mouth every morning    Sharps Container (BD SHARPS ) MISC   No No   Si Container as needed   ZOLMitriptan (ZOMIG) 5 MG tablet   No No   Sig: Take 1 tablet (5 mg) by mouth at onset of headache for migraine   acetaminophen (TYLENOL) 500 MG tablet   No No   Sig: Take 2 tablets (1,000 mg) by mouth every 8 hours   Patient taking differently: Take 1,000 mg by mouth every 8 hours as needed    amitriptyline (ELAVIL)  10 MG tablet   Yes No   Sig: Take 10 mg by mouth At Bedtime Take with a 50mg tab for a total of 60mg   amitriptyline (ELAVIL) 50 MG tablet   No No   Sig: Take 1 tablet (50 mg) by mouth At Bedtime   amylase-lipase-protease (CREON 24) 10324-66158 units CPEP per EC capsule   Yes Yes   Sig: Take 1-2 capsules by mouth Take with snacks or supplements   amylase-lipase-protease (CREON) 54940-36629 units CPEP per EC capsule   Yes Yes   Sig: Take 3-4 capsules by mouth 3 times daily (with meals) With oral meals   azelastine (ASTELIN) 0.1 % nasal spray   No No   Sig: Spray 1 spray into both nostrils 2 times daily   blood glucose (PAT CONTOUR NEXT) test strip   No No   Sig: Use to test blood sugar 6 times daily or as directed.   blood glucose monitoring (PAT MICROLET) lancets   No No   Sig: Use to test blood sugar 6-8 times daily or as directed.   cetirizine (ZYRTEC) 10 MG tablet   No No   Sig: Take 1 tablet (10 mg) by mouth daily   Patient taking differently: Take 10 mg by mouth every morning    cyclobenzaprine (FLEXERIL) 10 MG tablet   No No   Sig: Take 1 tablet (10 mg) by mouth 2 times daily as needed for muscle spasms   diphenhydrAMINE (BENADRYL ALLERGY) 25 MG capsule   No No   Sig: Take 1 capsule (25 mg) by mouth every 6 hours as needed for itching or allergies   estradiol (VAGIFEM) 10 MCG TABS vaginal tablet   No No   Sig: INSERT 1 TABLET(10 MCG) VAGINALLY 2 TIMES A WEEK   famotidine (PEPCID) 20 MG tablet   No No   Sig: Take 1 tablet (20 mg) by mouth 2 times daily   Patient taking differently: Take 20 mg by mouth every morning    glucose 40 % GEL gel   No No   Sig: Take 15-30 g by mouth every 15 minutes as needed for low blood sugar   ibuprofen (ADVIL/MOTRIN) 400 MG tablet   No No   Sig: Take 1 tablet (400 mg) by mouth every 6 hours as needed for moderate pain   levothyroxine (SYNTHROID/LEVOTHROID) 137 MCG tablet   No No   Sig: Take 1 tablet (137 mcg) by mouth daily   montelukast (SINGULAIR) 10 MG tablet   No No   Sig:  TAKE 1 TABLET(10 MG) BY MOUTH AT BEDTIME   omeprazole (PRILOSEC) 40 MG DR capsule   No No   Sig: Take 1 capsule (40 mg) by mouth 2 times daily   Patient taking differently: Take 40 mg by mouth every evening    order for DME   No No   Sig: Equipment being ordered:Cefaly   polyethylene glycol (MIRALAX) 17 GM/Dose powder   No No   Sig: Take 17 g (1 capful) by mouth daily   Patient taking differently: Take 1 capful by mouth every morning    prochlorperazine (COMPAZINE) 5 MG tablet   No No   Sig: Take 1 tablet (5 mg) by mouth every 6 hours as needed Take two 5 mg tablets by mouth every six hours as needed for nausea.   promethazine (PHENERGAN) 25 MG tablet   No No   Sig: Take 1 tablet (25 mg) by mouth daily as needed   scopolamine (TRANSDERM) 1 MG/3DAYS 72 hr patch   No No   Sig: Place 1 patch onto the skin every 72 hours   senna-docusate (SENOKOT-S;PERICOLACE) 8.6-50 MG per tablet   No No   Sig: Take 1-2 tablets by mouth 2 times daily   Patient taking differently: Take 1-2 tablets by mouth every morning       Facility-Administered Medications: None     Allergies   Allergies   Allergen Reactions     Apple Anaphylaxis     Corticosteroids Other (See Comments)     All oral, IV and injectable steroids are contraindicated (unless in life threatening situations) in Islet Auto transplant recipients. They can cause irreversible loss of islet cell function. Please contact patient's transplant care coordinator ADI Gaffney RN at [phone]/pager [phone] and/or endocrinologist prior to administration.       Depakote [Divalproex Sodium] Other (See Comments)     Chest pain     Zithromax [Azithromycin Dihydrate] Anaphylaxis     Noted in 4/7/08 ER     Darvocet [Propoxyphene N-Apap] Itching     Morphine Nausea and Vomiting and Rash     Nalbuphine Hcl Rash     RASH :nubaine     Zosyn [Piperacillin-Tazobactam In D5w] Rash     Possible allergy, did have a diffuse rash that seemed drug related but could have also been related to  soap in the hospital.      Bactrim [Sulfamethoxazole W-Trimethoprim] Other (See Comments) and Nausea and Vomiting     Severely low liver function.     Cats      Compazine [Prochlorperazine] Other (See Comments)     Twitching. Takes Benedryl and is fine     Dust Mites      Grass      Ragweeds      Tape [Adhesive Tape] Blisters     Tramadol      Trees      Zofran [Ondansetron] Other (See Comments)     migraine     Flagyl [Metronidazole] Hives and Rash       Physical Exam   Vital Signs: Temp: 97.7  F (36.5  C) Temp src: Oral BP: 100/56 Pulse: 82   Resp: 16 SpO2: 98 % O2 Device: None (Room air)    Weight: 0 lbs 0 oz    Constitutional: awake, alert, cooperative, no apparent distress, and appears stated age  Eyes: Lids and lashes normal, pupils equal, round and reactive to light, extra ocular muscles intact, sclera clear, conjunctiva normal  ENT: Normocephalic, without obvious abnormality, atraumatic, sinuses nontender on palpation, external ears without lesions, oral pharynx with moist mucous membranes, tonsils without erythema or exudates, gums normal and good dentition.  Hematologic / Lymphatic: no cervical lymphadenopathy  Respiratory: No increased work of breathing, good air exchange, clear to auscultation bilaterally, no crackles or wheezing  Cardiovascular: Normal apical impulse, regular rate and rhythm, normal S1 and S2, no S3 or S4, and no murmur noted  GI: No scars, normal bowel sounds, soft, non-distended, LUQ and mid left lateral tenderness, no masses palpated, no hepatosplenomegally. GJ tube in place with no redness or drainage at stoma.  Skin: no bruising or bleeding  Musculoskeletal: There is no redness, warmth, or swelling of the joints.  Full range of motion noted.  Motor strength is 5 out of 5 all extremities bilaterally.  Tone is normal.  Neurologic: Awake, alert, oriented to name, place and time.  Cranial nerves II-XII are grossly intact.  Motor is 5 out of 5 bilaterally.  Cerebellar finger to nose,  heel to shin intact.  Sensory is intact.  Babinski down going, Romberg negative, and gait is normal.  Neuropsychiatric: General: normal, calm and normal eye contact      Data   Data reviewed today: I reviewed all medications, new labs and imaging results over the last 24 hours. I personally reviewed   Recent Labs   Lab 11/28/21 0455 11/27/21 1848   WBC  --  5.5   HGB  --  13.6   MCV  --  97   PLT  --  398   INR  --  1.04   NA  --  139   POTASSIUM  --  3.9   CHLORIDE  --  106   CO2  --  29   BUN  --  12   CR  --  0.68   ANIONGAP  --  4   KASSI  --  9.1   * 88   ALBUMIN  --  3.7   PROTTOTAL  --  7.3   BILITOTAL  --  0.6   ALKPHOS  --  92   ALT  --  32   AST  --  21     Most Recent 3 CBC's:  Recent Labs   Lab Test 11/27/21 1848 09/24/21 0624 09/23/21  0608   WBC 5.5 6.7 6.6   HGB 13.6 14.3 13.2   MCV 97 94 95    496* 457*     Most Recent 3 BMP's:  Recent Labs   Lab Test 11/28/21  0455 11/27/21  1848 10/19/21  1350 09/24/21  0645 09/24/21  0624 09/23/21  0901 09/23/21  0608   NA  --  139  --   --  137  --  139   POTASSIUM  --  3.9  --   --  4.1  --  3.9   CHLORIDE  --  106  --   --  101  --  102   CO2  --  29  --   --  31  --  32   BUN  --  12  --   --  11  --  8   CR  --  0.68  --   --  0.58  --  0.55   ANIONGAP  --  4  --   --  5  --  5   KASSI  --  9.1  --   --  9.2  --  9.0   * 88 82   < > 116*   < > 143*    < > = values in this interval not displayed.     Most Recent 2 LFT's:  Recent Labs   Lab Test 11/27/21 1848 09/23/21  0608   AST 21 35   ALT 32 63*   ALKPHOS 92 122   BILITOTAL 0.6 0.4     Most Recent 3 INR's:  Recent Labs   Lab Test 11/27/21 1848 07/20/21  0943 07/13/21  1731   INR 1.04 1.13 1.10     Most Recent 3 Creatinines:  Recent Labs   Lab Test 11/27/21 1848 09/24/21  0624 09/23/21  0608   CR 0.68 0.58 0.55     Most Recent 3 Hemoglobins:  Recent Labs   Lab Test 11/27/21 1848 09/24/21  0624 09/23/21  0608   HGB 13.6 14.3 13.2     Most Recent TSH and T4:  Recent Labs   Lab Test  09/16/21  1308 02/19/19  1651 03/23/18  0958   TSH 0.67   < > 0.74   T4  --   --  1.13    < > = values in this interval not displayed.     Most Recent Hemoglobin A1c:  Recent Labs   Lab Test 11/27/21  1848   A1C 5.2     Most Recent 6 glucoses:  Recent Labs   Lab Test 11/28/21  0455 11/27/21  1848 10/19/21  1350 09/24/21  0645 09/24/21  0624 09/24/21  0156   * 88 82 119* 116* 123*     Most Recent Urinalysis:  Recent Labs   Lab Test 09/19/21  1808   COLOR Light Yellow   APPEARANCE Clear   URINEGLC Negative   URINEBILI Negative   URINEKETONE Negative   SG 1.014   UBLD Negative   URINEPH 5.0   PROTEIN Negative   NITRITE Negative   LEUKEST Moderate*   RBCU 2   WBCU 10*     5.5    \    13.6    /    398   N 42    L N/A    139    106    12 /   ------------------------------------ 102 (H)   ALT 32   AST 21   AP 92   ALB 3.7   Ca 9.1  3.9    29    0.68 \    % RETIC N/A    LDH N/A  Troponin N/A    BNP N/A    CK N/A  INR 1.04   PTT N/A    D-dimer N/A    Fibrinogen N/A    Antithrombin N/A  Ferritin N/A  CRP N/A    IL-6 N/A  Recent Results (from the past 24 hour(s))   XR Abdomen Port 1 View    Narrative    EXAM: XR ABDOMEN PORT 1 VIEWS  LOCATION: [facility]  DATE/TIME: 11/27/2021 9:46 PM    INDICATION: GJ tube clogged  COMPARISON: 10/21/2021.      Impression    IMPRESSION: Percutaneous gastrojejunostomy tube tip terminates over the proximal jejunum, though there is a 5 cm length of the catheter tubing proximally that appears redundant, coiled, and possibly twisted. This may affect tube function. Moderate amount   of formed colonic stool. No visible small bowel dilatation. Numerous surgical clips in the upper abdomen.

## 2021-11-28 NOTE — OR NURSING
Pt. Glasses placed on patient face. Natasha ring and band placed on left ring finger. New GJ tube supplies placed in clear plastic bag and placed on patient IV pole.

## 2021-11-30 ENCOUNTER — TELEPHONE (OUTPATIENT)
Dept: GASTROENTEROLOGY | Facility: CLINIC | Age: 56
End: 2021-11-30
Payer: COMMERCIAL

## 2021-11-30 NOTE — TELEPHONE ENCOUNTER
"Returned call to patient. Per patient, \"not doing so great\", site is \"tight and swollen\", green discharge by does not smell, home RN can see the back side of balloon on the Jport, through the skin.     Pt does report that she's vomited. Was just taught to use gport for venting by home RN. Has not been nauseated prior to vomiting. Advised patient to vent gtube at night. Pt reports she did eat toast last night and that came up when she vomited. Reviewed her challenges with slow gastric motility, pt understands this challenge.    Tubes are both working ok for flushing/feeds.    Pt will send a photo through [employer] of Jport so we can better address swelling.    Kenya Victor RN Care Coordinator    "

## 2021-11-30 NOTE — TELEPHONE ENCOUNTER
Health Call Center    Phone Message    May a detailed message be left on voicemail: yes     Reason for Call: Other: Dinora is calling in asking for a call back from [place]. She states that she has noticed some pain and inflammation near the tube that was placed on 11/28, and would like to discuss seeing Dr. Park about this. She also states that she can see on [employer] that she should still have 1 refill for her compazine, but her pharmacy is stating that they do not have any orders stating she has any remaining. PLease all back as soon as possible to discuss.     Action Taken: Message routed to:  [facility]    Travel Screening: Not Applicable

## 2021-11-30 NOTE — TELEPHONE ENCOUNTER
[facility] Call [facility]    Phone Message    May a detailed message be left on voicemail: yes     Reason for Call: Other: Pt called in requesting a call back about the pictures she recently uploaded. Please reach out. Thank you.     Action Taken: Message routed to:  [facility] matty    Travel Screening: Not Applicable

## 2021-11-30 NOTE — TELEPHONE ENCOUNTER
Reviewed updates from MD- no concerns with tube as of now    Advised to vent G as needed. Advised to tape Jtube closer skin and that Dr. Montoya says she'll have better experience with low profile tube we can put in in about 4 weeks.   Will try flexeril and tylenol rather than dilaudid for pain as its making her nauseated/vomit, advised that was ok plan.     Encouraged continued questions.    ML

## 2021-12-01 ENCOUNTER — MYC MEDICAL ADVICE (OUTPATIENT)
Dept: GASTROENTEROLOGY | Facility: CLINIC | Age: 56
End: 2021-12-01
Payer: COMMERCIAL

## 2021-12-01 ENCOUNTER — CARE COORDINATION (OUTPATIENT)
Dept: GASTROENTEROLOGY | Facility: CLINIC | Age: 56
End: 2021-12-01
Payer: COMMERCIAL

## 2021-12-01 ENCOUNTER — PREP FOR PROCEDURE (OUTPATIENT)
Dept: GASTROENTEROLOGY | Facility: CLINIC | Age: 56
End: 2021-12-01
Payer: COMMERCIAL

## 2021-12-01 DIAGNOSIS — Z46.59 ENCOUNTER FOR CARE RELATED TO FEEDING TUBE: Primary | ICD-10-CM

## 2021-12-01 NOTE — PROGRESS NOTES
Per Dr. Montoya  We will be sure to have her return at 4w to exchange this to a low profile tube which should improve her experience     Please assist in scheduling:     Procedure/Imaging/Clinic: bookletmobiletube exchange  Physician: Dr. Montoya  [email or web address]  Procedure length:50 min  Anesthesia:gen  Dx: encounter related to feeding tube  Tier:2  Location: [facility]     Orders placed, discussed with patient, she says its ok to pick a day and put in MyChart.    ML

## 2021-12-02 ENCOUNTER — VIRTUAL VISIT (OUTPATIENT)
Dept: PSYCHOLOGY | Facility: CLINIC | Age: 56
End: 2021-12-02
Payer: COMMERCIAL

## 2021-12-02 ENCOUNTER — HOME INFUSION (PRE-WILLOW HOME INFUSION) (OUTPATIENT)
Dept: PHARMACY | Facility: CLINIC | Age: 56
End: 2021-12-02
Payer: COMMERCIAL

## 2021-12-02 ENCOUNTER — TELEPHONE (OUTPATIENT)
Dept: GASTROENTEROLOGY | Facility: CLINIC | Age: 56
End: 2021-12-02
Payer: COMMERCIAL

## 2021-12-02 DIAGNOSIS — F43.23 ADJUSTMENT DISORDER WITH MIXED ANXIETY AND DEPRESSED MOOD: Primary | ICD-10-CM

## 2021-12-02 PROCEDURE — 90837 PSYTX W PT 60 MINUTES: CPT | Mod: 95 | Performed by: STUDENT IN AN ORGANIZED HEALTH CARE EDUCATION/TRAINING PROGRAM

## 2021-12-02 NOTE — PROGRESS NOTES
"  Health Psychology                                                                                                                          Sweta Michelle, Ph.D., L.P [phone]  Melva Beebe, Ph.D., L.P. [phone]  Rachel Sloan, Ph.D. [phone]  Kenya Sanchez, Ph.D., L.P. [phone]  Estrada Patel, Ph.D., A.B.P.P., L.P. [phone]         Enedina Joaquin, Ph.D., L.P. [phone]                [facility], 3rd Floor  [address] [place] [address]     Health Psychology Follow-up Visit - Telehealth Visit     Dinora Mcghee is a 56 year old female who is being evaluated via a billable video visit.       The patient has been notified of following:      \"This video visit will be conducted via a video visit between you and your physician/provider. We have found that certain health care needs can be provided without the need for an in-person physical exam.  This service lets us provide the care you need with a video conversation.  If a prescription is necessary we can send it directly to your pharmacy.  If lab work is needed we can place an order for that and you can then stop by our lab to have the test done at a later time.     Video visits are billed at different rates depending on your insurance coverage.  Please reach out to your insurance provider with any questions.     If during the course of the call the physician/provider feels a video visit is not appropriate, you will not be charged for this service.\"     Patient has given verbal consent for Video visit? Yes     Will anyone else be joining your video visit? No    Date of Service:  12/2/21    SUBJECTIVE:  Dinora Mcghee was seen for individual psychotherapy. Reviewed emotional and physical health since our last session.    Symptoms reported: Pain very high, nausea, low mood, frustration with not knowing how to fix pain. Many limitations due to pain. Feeling hopeless. She described not " wanting her life to be this way but denied any active thoughts of suicide including having a plan, means, or intent to harm herself. Worrying about how current health status impact upcoming commitments, unsure of how to navigate these.    Progress towards goals: Taking Tylenol to manage pain. Engaging in passive activities such as knitting or watching TV but these are not fulfilling.     Interventions utilized: Reviewed experiences in interim and going to ED for surgical procedure. Supported Dinora in processing emotional reactions to the prolonged nature of distress and discomfort. Validated her emotional experiences. Discussed how typical coping skills she uses to maintain wellbeing (I.e. gratitude practice, focusing on positive aspects of life, etc) are not as effective when she is feeling so poorly. Suggested a focus on skills related to distress tolerance which are designed to survive the most challenging situations until we can reconnect with skills or activities that help us feel better.      Ms. Mcghee was engaged throughout the visit but expressed feeling as if therapy was not helpful in the moment.  She was unsure if she wanted to schedule a follow-up.  She was agreeable to this provider reaching out in two weeks to assess interest and schedule an appointment if necessary.    OBJECTIVE:  Ms. Mcghee was available for scheduled visit.  She reported sad mood, anxious.  Affect was flat.  Tearful at times.  Insight and judgment good.  Thought processes logical and linear. Speech WNL.  Denied suicidal ideation.     ASSESSMENT:  Psychological Assessment:  The PHQ-9 is an instrument for screening, diagnosing, monitoring and measuring the severity of depression. Scores of 5, 10, 15, and 20 represent cutpoints for mild, moderate, moderately severe and severe depression, respectively.   PHQ-9 SCORE 7/1/2019 6/21/2021 7/8/2021   PHQ-9 Total Score [facility] 3 (Minimal depression) 13 (Moderate depression) 13 (Moderate  depression)   PHQ-9 Total Score 3 13 13       The DARCY-7 is an instrument for screening, diagnosing, monitoring and measuring the severity of anxiety. Scores of 5, 10, and 15 represent cutpoints for mild, moderate, and severe anxiety, respectively.  DARCY-7 SCORE 1/11/2018 1/22/2018 6/21/2021   Total Score 2 (minimal anxiety) - 0 (minimal anxiety)   Total Score 2 2 0     Dinora expressed benefit from therapy in the past but is currently expressing more doubt about the process.  She exhibited resistance to therapeutic recommendations at this time but agreed to follow-up communication via [employer].    DIAGNOSIS:  Adjustment disorder with mixed anxiety and depressed mood.     PLAN:  Communicate Via [employer] Messages,  This provider will reach out to Dinora in two weeks from this appointment.     Type of service: Telemedicine Psychotherapy for coping with health conditions and symptoms of depression and anxiety  Time of service:   ? Date: 12/2/21  ? Time Service Began: 9:00  ? Time Service Ended: 10:00  Reason that telemedicine is appropriate: Public health regulations due to COVID-19 pandemic/state of emergency  Mode of transmission: Secure real time interactive audio and visual telecommunication system via [email or web address]  Location of originating and distant sites:  ? Originating site (patient location): Patient's home  ? Distant site (provider site): Provider's home    Rachel Sloan, PhD,   Health Psychology Fellow    Tx plan completed: 7/8/21  Tx plan due:  7/8/22     I did not see this patient directly. I have read and agree with the contents of this note. [place] Sanchez, PhD, LP, December 3, 2021

## 2021-12-02 NOTE — TELEPHONE ENCOUNTER
Scheduling Details    Which Colonoscopy Prep was Sent?:   Procedure Scheduled: Feeding Tube check  Surgeon: Xavier  Date of Procedure: 12/3/2021  Location: [facility]  Caller (Please ask for phone number if not scheduled by patient): Dinora Mcghee      Sedation Type: CS  Conscious Sedation- Needs  for 6 hours after the procedure  MAC/General-Needs  for 24 hours after procedure    Pre-op Required at [facility], [place], [place] and OR for MAC sedation:   (if yes advise patient they will need a pre-op prior to procedure)      Is patient on blood thinners? - (If yes- inform patient to follow up with PCP or provider for follow up instructions)     Informed patient they will need an adult  yes  Cannot take any type of public or medical transportation alone    [facility] yes had it today    Confirmed Nurse will call to complete assessment yes     Additional comments: no

## 2021-12-02 NOTE — TELEPHONE ENCOUNTER
"Pt called in with increased pain and drainage at tube site, Says he gave herself enema but \"nothing came out\". Having pain with peristalsis.     Organized visit to Endo with Dr. Park tomorrow at 9am, she will get covid test locally today. Orders placed.    ML  "

## 2021-12-03 ENCOUNTER — HOSPITAL ENCOUNTER (OUTPATIENT)
Facility: CLINIC | Age: 56
Discharge: ANOTHER HOSPITAL | End: 2021-12-03
Attending: INTERNAL MEDICINE | Admitting: INTERNAL MEDICINE
Payer: COMMERCIAL

## 2021-12-03 ENCOUNTER — HOSPITAL ENCOUNTER (INPATIENT)
Facility: CLINIC | Age: 56
LOS: 4 days | Discharge: HOME HEALTH | End: 2021-12-07
Attending: EMERGENCY MEDICINE | Admitting: SURGERY
Payer: COMMERCIAL

## 2021-12-03 ENCOUNTER — APPOINTMENT (OUTPATIENT)
Dept: CT IMAGING | Facility: CLINIC | Age: 56
End: 2021-12-03
Attending: EMERGENCY MEDICINE
Payer: COMMERCIAL

## 2021-12-03 DIAGNOSIS — T81.43XA POSTPROCEDURAL INTRAABDOMINAL ABSCESS (H): ICD-10-CM

## 2021-12-03 DIAGNOSIS — K65.1 INTRA-ABDOMINAL ABSCESS (H): Primary | ICD-10-CM

## 2021-12-03 DIAGNOSIS — R10.84 ABDOMINAL PAIN, GENERALIZED: ICD-10-CM

## 2021-12-03 DIAGNOSIS — K65.1 POSTPROCEDURAL INTRAABDOMINAL ABSCESS (H): ICD-10-CM

## 2021-12-03 LAB
ALBUMIN SERPL-MCNC: 3 G/DL (ref 3.4–5)
ALBUMIN UR-MCNC: NEGATIVE MG/DL
ALP SERPL-CCNC: 104 U/L (ref 40–150)
ALT SERPL W P-5'-P-CCNC: 19 U/L (ref 0–50)
ANION GAP SERPL CALCULATED.3IONS-SCNC: 5 MMOL/L (ref 3–14)
APPEARANCE UR: CLEAR
AST SERPL W P-5'-P-CCNC: 11 U/L (ref 0–45)
BASOPHILS # BLD AUTO: 0.1 10E3/UL (ref 0–0.2)
BASOPHILS NFR BLD AUTO: 1 %
BILIRUB SERPL-MCNC: 0.5 MG/DL (ref 0.2–1.3)
BILIRUB UR QL STRIP: NEGATIVE
BUN SERPL-MCNC: 11 MG/DL (ref 7–30)
CALCIUM SERPL-MCNC: 9.2 MG/DL (ref 8.5–10.1)
CHLORIDE BLD-SCNC: 104 MMOL/L (ref 94–109)
CO2 SERPL-SCNC: 29 MMOL/L (ref 20–32)
COLOR UR AUTO: ABNORMAL
CREAT BLD-MCNC: 0.5 MG/DL (ref 0.5–1)
CREAT SERPL-MCNC: 0.55 MG/DL (ref 0.52–1.04)
EOSINOPHIL # BLD AUTO: 0 10E3/UL (ref 0–0.7)
EOSINOPHIL NFR BLD AUTO: 0 %
ERYTHROCYTE [DISTWIDTH] IN BLOOD BY AUTOMATED COUNT: 13.5 % (ref 10–15)
GFR SERPL CREATININE-BSD FRML MDRD: >60 ML/MIN/1.73M2
GFR SERPL CREATININE-BSD FRML MDRD: >90 ML/MIN/1.73M2
GLUCOSE BLD-MCNC: 115 MG/DL (ref 70–99)
GLUCOSE UR STRIP-MCNC: NEGATIVE MG/DL
HCT VFR BLD AUTO: 39.4 % (ref 35–47)
HGB BLD-MCNC: 13.1 G/DL (ref 11.7–15.7)
HGB UR QL STRIP: NEGATIVE
HOLD SPECIMEN: NORMAL
HOLD SPECIMEN: NORMAL
IMM GRANULOCYTES # BLD: 0 10E3/UL
IMM GRANULOCYTES NFR BLD: 0 %
INR PPP: 0.97 (ref 0.85–1.15)
KETONES UR STRIP-MCNC: NEGATIVE MG/DL
LACTATE SERPL-SCNC: 0.9 MMOL/L (ref 0.7–2)
LEUKOCYTE ESTERASE UR QL STRIP: NEGATIVE
LIPASE SERPL-CCNC: <10 U/L (ref 73–393)
LYMPHOCYTES # BLD AUTO: 1.2 10E3/UL (ref 0.8–5.3)
LYMPHOCYTES NFR BLD AUTO: 13 %
MAGNESIUM SERPL-MCNC: 2.1 MG/DL (ref 1.6–2.3)
MCH RBC QN AUTO: 31.8 PG (ref 26.5–33)
MCHC RBC AUTO-ENTMCNC: 33.2 G/DL (ref 31.5–36.5)
MCV RBC AUTO: 96 FL (ref 78–100)
MONOCYTES # BLD AUTO: 0.9 10E3/UL (ref 0–1.3)
MONOCYTES NFR BLD AUTO: 9 %
MUCOUS THREADS #/AREA URNS LPF: PRESENT /LPF
NEUTROPHILS # BLD AUTO: 7.3 10E3/UL (ref 1.6–8.3)
NEUTROPHILS NFR BLD AUTO: 77 %
NITRATE UR QL: NEGATIVE
NRBC # BLD AUTO: 0 10E3/UL
NRBC BLD AUTO-RTO: 0 /100
PH UR STRIP: 7 [PH] (ref 5–7)
PLATELET # BLD AUTO: 439 10E3/UL (ref 150–450)
POTASSIUM BLD-SCNC: 4.1 MMOL/L (ref 3.4–5.3)
POTASSIUM BLD-SCNC: 4.2 MMOL/L (ref 3.4–5.3)
PROT SERPL-MCNC: 7.1 G/DL (ref 6.8–8.8)
RBC # BLD AUTO: 4.12 10E6/UL (ref 3.8–5.2)
RBC URINE: 1 /HPF
SODIUM SERPL-SCNC: 138 MMOL/L (ref 133–144)
SP GR UR STRIP: 1.01 (ref 1–1.03)
SQUAMOUS EPITHELIAL: <1 /HPF
UROBILINOGEN UR STRIP-MCNC: NORMAL MG/DL
WBC # BLD AUTO: 9.6 10E3/UL (ref 4–11)
WBC URINE: <1 /HPF

## 2021-12-03 PROCEDURE — 84132 ASSAY OF SERUM POTASSIUM: CPT | Performed by: STUDENT IN AN ORGANIZED HEALTH CARE EDUCATION/TRAINING PROGRAM

## 2021-12-03 PROCEDURE — 250N000011 HC RX IP 250 OP 636: Performed by: STUDENT IN AN ORGANIZED HEALTH CARE EDUCATION/TRAINING PROGRAM

## 2021-12-03 PROCEDURE — 99285 EMERGENCY DEPT VISIT HI MDM: CPT | Mod: 25 | Performed by: EMERGENCY MEDICINE

## 2021-12-03 PROCEDURE — 36415 COLL VENOUS BLD VENIPUNCTURE: CPT | Performed by: EMERGENCY MEDICINE

## 2021-12-03 PROCEDURE — 999N000127 HC STATISTIC PERIPHERAL IV START W US GUIDANCE

## 2021-12-03 PROCEDURE — 250N000013 HC RX MED GY IP 250 OP 250 PS 637: Performed by: STUDENT IN AN ORGANIZED HEALTH CARE EDUCATION/TRAINING PROGRAM

## 2021-12-03 PROCEDURE — 258N000003 HC RX IP 258 OP 636: Performed by: EMERGENCY MEDICINE

## 2021-12-03 PROCEDURE — 96376 TX/PRO/DX INJ SAME DRUG ADON: CPT | Performed by: EMERGENCY MEDICINE

## 2021-12-03 PROCEDURE — 82565 ASSAY OF CREATININE: CPT

## 2021-12-03 PROCEDURE — 96365 THER/PROPH/DIAG IV INF INIT: CPT | Performed by: EMERGENCY MEDICINE

## 2021-12-03 PROCEDURE — 85025 COMPLETE CBC W/AUTO DIFF WBC: CPT | Performed by: EMERGENCY MEDICINE

## 2021-12-03 PROCEDURE — 96375 TX/PRO/DX INJ NEW DRUG ADDON: CPT | Performed by: EMERGENCY MEDICINE

## 2021-12-03 PROCEDURE — 250N000011 HC RX IP 250 OP 636: Performed by: EMERGENCY MEDICINE

## 2021-12-03 PROCEDURE — 250N000011 HC RX IP 250 OP 636

## 2021-12-03 PROCEDURE — 99223 1ST HOSP IP/OBS HIGH 75: CPT | Mod: GC | Performed by: INTERNAL MEDICINE

## 2021-12-03 PROCEDURE — 83735 ASSAY OF MAGNESIUM: CPT | Performed by: STUDENT IN AN ORGANIZED HEALTH CARE EDUCATION/TRAINING PROGRAM

## 2021-12-03 PROCEDURE — 80053 COMPREHEN METABOLIC PANEL: CPT | Performed by: EMERGENCY MEDICINE

## 2021-12-03 PROCEDURE — 99285 EMERGENCY DEPT VISIT HI MDM: CPT | Performed by: EMERGENCY MEDICINE

## 2021-12-03 PROCEDURE — 83605 ASSAY OF LACTIC ACID: CPT | Performed by: EMERGENCY MEDICINE

## 2021-12-03 PROCEDURE — 96361 HYDRATE IV INFUSION ADD-ON: CPT | Performed by: EMERGENCY MEDICINE

## 2021-12-03 PROCEDURE — 74177 CT ABD & PELVIS W/CONTRAST: CPT | Mod: 26 | Performed by: RADIOLOGY

## 2021-12-03 PROCEDURE — 81003 URINALYSIS AUTO W/O SCOPE: CPT | Performed by: EMERGENCY MEDICINE

## 2021-12-03 PROCEDURE — 258N000003 HC RX IP 258 OP 636: Performed by: STUDENT IN AN ORGANIZED HEALTH CARE EDUCATION/TRAINING PROGRAM

## 2021-12-03 PROCEDURE — 85610 PROTHROMBIN TIME: CPT | Performed by: SURGERY

## 2021-12-03 PROCEDURE — 83690 ASSAY OF LIPASE: CPT | Performed by: EMERGENCY MEDICINE

## 2021-12-03 PROCEDURE — 36415 COLL VENOUS BLD VENIPUNCTURE: CPT | Performed by: STUDENT IN AN ORGANIZED HEALTH CARE EDUCATION/TRAINING PROGRAM

## 2021-12-03 PROCEDURE — 74177 CT ABD & PELVIS W/CONTRAST: CPT

## 2021-12-03 PROCEDURE — 120N000011 HC R&B TRANSPLANT UMMC

## 2021-12-03 RX ORDER — SCOLOPAMINE TRANSDERMAL SYSTEM 1 MG/1
1 PATCH, EXTENDED RELEASE TRANSDERMAL
Status: DISCONTINUED | OUTPATIENT
Start: 2021-12-03 | End: 2021-12-07 | Stop reason: HOSPADM

## 2021-12-03 RX ORDER — ONDANSETRON 4 MG/1
4 TABLET, ORALLY DISINTEGRATING ORAL EVERY 6 HOURS PRN
Status: DISCONTINUED | OUTPATIENT
Start: 2021-12-03 | End: 2021-12-07 | Stop reason: HOSPADM

## 2021-12-03 RX ORDER — OXYCODONE HYDROCHLORIDE 5 MG/1
5 TABLET ORAL
Status: DISCONTINUED | OUTPATIENT
Start: 2021-12-03 | End: 2021-12-07 | Stop reason: HOSPADM

## 2021-12-03 RX ORDER — METHOCARBAMOL 750 MG/1
750 TABLET, FILM COATED ORAL 3 TIMES DAILY
Status: DISCONTINUED | OUTPATIENT
Start: 2021-12-03 | End: 2021-12-07 | Stop reason: HOSPADM

## 2021-12-03 RX ORDER — SODIUM CHLORIDE, SODIUM LACTATE, POTASSIUM CHLORIDE, CALCIUM CHLORIDE 600; 310; 30; 20 MG/100ML; MG/100ML; MG/100ML; MG/100ML
INJECTION, SOLUTION INTRAVENOUS CONTINUOUS
Status: DISCONTINUED | OUTPATIENT
Start: 2021-12-03 | End: 2021-12-07 | Stop reason: HOSPADM

## 2021-12-03 RX ORDER — ZOLMITRIPTAN 5 MG/1
5 TABLET, FILM COATED ORAL
Status: DISCONTINUED | OUTPATIENT
Start: 2021-12-03 | End: 2021-12-07 | Stop reason: HOSPADM

## 2021-12-03 RX ORDER — HYDROMORPHONE HYDROCHLORIDE 1 MG/ML
0.5 INJECTION, SOLUTION INTRAMUSCULAR; INTRAVENOUS; SUBCUTANEOUS
Status: COMPLETED | OUTPATIENT
Start: 2021-12-03 | End: 2021-12-03

## 2021-12-03 RX ORDER — DIPHENHYDRAMINE HCL 25 MG
25 CAPSULE ORAL EVERY 6 HOURS PRN
Status: DISCONTINUED | OUTPATIENT
Start: 2021-12-03 | End: 2021-12-03

## 2021-12-03 RX ORDER — SODIUM CHLORIDE, SODIUM LACTATE, POTASSIUM CHLORIDE, CALCIUM CHLORIDE 600; 310; 30; 20 MG/100ML; MG/100ML; MG/100ML; MG/100ML
1000 INJECTION, SOLUTION INTRAVENOUS CONTINUOUS
Status: DISCONTINUED | OUTPATIENT
Start: 2021-12-03 | End: 2021-12-05

## 2021-12-03 RX ORDER — ERTAPENEM 1 G/1
1 INJECTION, POWDER, LYOPHILIZED, FOR SOLUTION INTRAMUSCULAR; INTRAVENOUS EVERY 24 HOURS
Status: DISCONTINUED | OUTPATIENT
Start: 2021-12-04 | End: 2021-12-07 | Stop reason: HOSPADM

## 2021-12-03 RX ORDER — CETIRIZINE HYDROCHLORIDE 10 MG/1
10 TABLET ORAL DAILY
Status: DISCONTINUED | OUTPATIENT
Start: 2021-12-04 | End: 2021-12-07 | Stop reason: HOSPADM

## 2021-12-03 RX ORDER — AMOXICILLIN 250 MG
1-2 CAPSULE ORAL 2 TIMES DAILY
Status: DISCONTINUED | OUTPATIENT
Start: 2021-12-03 | End: 2021-12-07 | Stop reason: HOSPADM

## 2021-12-03 RX ORDER — ACETAMINOPHEN 325 MG/1
650 TABLET ORAL EVERY 4 HOURS PRN
Status: DISCONTINUED | OUTPATIENT
Start: 2021-12-03 | End: 2021-12-07 | Stop reason: HOSPADM

## 2021-12-03 RX ORDER — ONDANSETRON 2 MG/ML
4 INJECTION INTRAMUSCULAR; INTRAVENOUS EVERY 6 HOURS PRN
Status: DISCONTINUED | OUTPATIENT
Start: 2021-12-03 | End: 2021-12-07 | Stop reason: HOSPADM

## 2021-12-03 RX ORDER — ERTAPENEM 1 G/1
1 INJECTION, POWDER, LYOPHILIZED, FOR SOLUTION INTRAMUSCULAR; INTRAVENOUS ONCE
Status: COMPLETED | OUTPATIENT
Start: 2021-12-03 | End: 2021-12-03

## 2021-12-03 RX ORDER — AZELASTINE 1 MG/ML
1 SPRAY, METERED NASAL 2 TIMES DAILY
Status: DISCONTINUED | OUTPATIENT
Start: 2021-12-03 | End: 2021-12-07 | Stop reason: HOSPADM

## 2021-12-03 RX ORDER — DIPHENHYDRAMINE HYDROCHLORIDE 50 MG/ML
25 INJECTION INTRAMUSCULAR; INTRAVENOUS EVERY 6 HOURS PRN
Status: DISCONTINUED | OUTPATIENT
Start: 2021-12-03 | End: 2021-12-07 | Stop reason: HOSPADM

## 2021-12-03 RX ORDER — PANTOPRAZOLE SODIUM 20 MG/1
40 TABLET, DELAYED RELEASE ORAL 2 TIMES DAILY
Status: DISCONTINUED | OUTPATIENT
Start: 2021-12-03 | End: 2021-12-07 | Stop reason: HOSPADM

## 2021-12-03 RX ORDER — HYDROMORPHONE HYDROCHLORIDE 1 MG/ML
.3-.5 INJECTION, SOLUTION INTRAMUSCULAR; INTRAVENOUS; SUBCUTANEOUS
Status: DISCONTINUED | OUTPATIENT
Start: 2021-12-03 | End: 2021-12-07 | Stop reason: HOSPADM

## 2021-12-03 RX ORDER — SODIUM CHLORIDE 9 MG/ML
INJECTION, SOLUTION INTRAVENOUS CONTINUOUS
Status: DISCONTINUED | OUTPATIENT
Start: 2021-12-03 | End: 2021-12-04

## 2021-12-03 RX ORDER — PROCHLORPERAZINE MALEATE 10 MG
10 TABLET ORAL EVERY 6 HOURS PRN
Status: DISCONTINUED | OUTPATIENT
Start: 2021-12-03 | End: 2021-12-07 | Stop reason: HOSPADM

## 2021-12-03 RX ORDER — DIPHENHYDRAMINE HCL 25 MG
25 CAPSULE ORAL EVERY 6 HOURS PRN
Status: DISCONTINUED | OUTPATIENT
Start: 2021-12-03 | End: 2021-12-07 | Stop reason: HOSPADM

## 2021-12-03 RX ORDER — PROCHLORPERAZINE 25 MG
25 SUPPOSITORY, RECTAL RECTAL EVERY 12 HOURS PRN
Status: DISCONTINUED | OUTPATIENT
Start: 2021-12-03 | End: 2021-12-07 | Stop reason: HOSPADM

## 2021-12-03 RX ORDER — LEVOTHYROXINE SODIUM 137 UG/1
137 TABLET ORAL DAILY
Status: DISCONTINUED | OUTPATIENT
Start: 2021-12-04 | End: 2021-12-07 | Stop reason: HOSPADM

## 2021-12-03 RX ORDER — FAMOTIDINE 20 MG/1
20 TABLET, FILM COATED ORAL 2 TIMES DAILY
Status: DISCONTINUED | OUTPATIENT
Start: 2021-12-03 | End: 2021-12-07 | Stop reason: HOSPADM

## 2021-12-03 RX ORDER — LIDOCAINE 4 G/G
1 PATCH TOPICAL
Status: DISCONTINUED | OUTPATIENT
Start: 2021-12-03 | End: 2021-12-07 | Stop reason: HOSPADM

## 2021-12-03 RX ORDER — IOPAMIDOL 755 MG/ML
81 INJECTION, SOLUTION INTRAVASCULAR ONCE
Status: COMPLETED | OUTPATIENT
Start: 2021-12-03 | End: 2021-12-03

## 2021-12-03 RX ORDER — POLYETHYLENE GLYCOL 3350 17 G/17G
17 POWDER, FOR SOLUTION ORAL DAILY PRN
Status: DISCONTINUED | OUTPATIENT
Start: 2021-12-03 | End: 2021-12-07 | Stop reason: HOSPADM

## 2021-12-03 RX ADMIN — SODIUM CHLORIDE, POTASSIUM CHLORIDE, SODIUM LACTATE AND CALCIUM CHLORIDE 1000 ML: 600; 310; 30; 20 INJECTION, SOLUTION INTRAVENOUS at 20:22

## 2021-12-03 RX ADMIN — HYDROMORPHONE HYDROCHLORIDE 0.5 MG: 1 INJECTION, SOLUTION INTRAMUSCULAR; INTRAVENOUS; SUBCUTANEOUS at 22:09

## 2021-12-03 RX ADMIN — AMITRIPTYLINE HYDROCHLORIDE 50 MG: 50 TABLET, FILM COATED ORAL at 23:28

## 2021-12-03 RX ADMIN — HYDROMORPHONE HYDROCHLORIDE 0.5 MG: 1 INJECTION, SOLUTION INTRAMUSCULAR; INTRAVENOUS; SUBCUTANEOUS at 16:18

## 2021-12-03 RX ADMIN — DOCUSATE SODIUM 286 ML: 50 LIQUID ORAL at 20:37

## 2021-12-03 RX ADMIN — LIDOCAINE 1 PATCH: 560 PATCH PERCUTANEOUS; TOPICAL; TRANSDERMAL at 20:26

## 2021-12-03 RX ADMIN — HYDROMORPHONE HYDROCHLORIDE 0.5 MG: 1 INJECTION, SOLUTION INTRAMUSCULAR; INTRAVENOUS; SUBCUTANEOUS at 12:51

## 2021-12-03 RX ADMIN — SODIUM CHLORIDE, POTASSIUM CHLORIDE, SODIUM LACTATE AND CALCIUM CHLORIDE 1000 ML: 600; 310; 30; 20 INJECTION, SOLUTION INTRAVENOUS at 16:29

## 2021-12-03 RX ADMIN — PROCHLORPERAZINE EDISYLATE 10 MG: 5 INJECTION INTRAMUSCULAR; INTRAVENOUS at 22:09

## 2021-12-03 RX ADMIN — IOPAMIDOL 81 ML: 755 INJECTION, SOLUTION INTRAVENOUS at 14:37

## 2021-12-03 RX ADMIN — PROCHLORPERAZINE EDISYLATE 10 MG: 5 INJECTION INTRAMUSCULAR; INTRAVENOUS at 12:49

## 2021-12-03 RX ADMIN — SODIUM CHLORIDE 1000 ML: 9 INJECTION, SOLUTION INTRAVENOUS at 12:28

## 2021-12-03 RX ADMIN — ERTAPENEM SODIUM 1 G: 1 INJECTION, POWDER, LYOPHILIZED, FOR SOLUTION INTRAMUSCULAR; INTRAVENOUS at 16:29

## 2021-12-03 RX ADMIN — DIPHENHYDRAMINE HYDROCHLORIDE 25 MG: 50 INJECTION, SOLUTION INTRAMUSCULAR; INTRAVENOUS at 22:09

## 2021-12-03 ASSESSMENT — ENCOUNTER SYMPTOMS
SHORTNESS OF BREATH: 0
DIARRHEA: 0
NAUSEA: 1
FREQUENCY: 0
POLYDIPSIA: 0
CHILLS: 0
LIGHT-HEADEDNESS: 0
HEADACHES: 1
BRUISES/BLEEDS EASILY: 0
FATIGUE: 1
VOMITING: 0
ABDOMINAL PAIN: 1
CONSTIPATION: 0
ADENOPATHY: 0
DIFFICULTY URINATING: 0
ARTHRALGIAS: 0
NECK PAIN: 0
FEVER: 0
DYSURIA: 0
EYE REDNESS: 0
COUGH: 0
CONFUSION: 0
COLOR CHANGE: 0
NECK STIFFNESS: 0

## 2021-12-03 ASSESSMENT — ACTIVITIES OF DAILY LIVING (ADL)
ADLS_ACUITY_SCORE: 6
ADLS_ACUITY_SCORE: 14

## 2021-12-03 NOTE — ED NOTES
Bed: ED17  Expected date:   Expected time:   Means of arrival:   Comments:  Dinora  Abd pain, tube replacement

## 2021-12-03 NOTE — ED TRIAGE NOTES
Pt arrives from the OR for concerns of pain at her J tube site. Pt was told to come here to get imaging confirmation of correct placement of her tube.    Hedy Ortega RN on 12/3/2021 at 11:49 AM

## 2021-12-03 NOTE — PROGRESS NOTES
Direct J placed here 5 days ago. Increasing exquisite tenderness. Pressure w tube feedings. No obvious erythema, but exquisiste tenderness in region of J. Suspect dislodged PEJ vs abcess.     REC: evaluate in ED - spoke w ED MD - IV, fluids, pain control, and CT scan w small amount contrast injected in J tube (left lower quadrant tube) to check placement, likely admission, consult w [facility] and [facility] bariatric surgery.     MF

## 2021-12-03 NOTE — ED PROVIDER NOTES
"    [place] EMERGENCY DEPARTMENT ([facility])  12/03/21 ED 17 12:05 PM    History     Chief Complaint   Patient presents with     Abdominal Pain     The history is provided by the patient and medical records.     Dinora Mcghee is a 56 year old female with history of jamel-en-Y gastric bypass, chronic pancreatitis status post total pancreatectomy with islet autotransplantation complicated by gastroparesis requiring tube feeds who presents to the Emergency Department with constant, pressure-like, moderate, nonradiating, diffuse, left-sided abdominal pain, nausea and dry heaves.  Patient had had a GJ tube in place which was recently converted into a low-profile G-tube and separate J-tube with gastropexy 5 days ago. She developed severe left sided abdominal pain that worsens with tube feeds. She was seen in GI clinic today by Dr. Park who found exquisite tenderness in area of her J tube. She was advised to come to the Emergency Department for further evaluation. Family states that Dr. Park did drain a large amount from her tube prior to arrival.  Patient feels generally shaky, nauseated with this severe left-sided abdominal tenderness.  She feels as if there is fluid being backed up.  She endorses dry heaves but no vomiting.  She endorses subjective fevers, no documented fevers as her home thermometer is broken.  She did have 2 bowel movements yesterday but feels that she is \"packed\" with stool.  She has not eaten today. She also has recurrence of a migraine headache.  She notes that Compazine tends to work best for her when she has these symptoms.  Urine output is good, though dark.  She notes that she was on feeding tube all night through J tube. No cough.    Past Medical History  Past Medical History:   Diagnosis Date     Allergic rhinitis, cause unspecified      Allergy to other foods     strawberries, apples, celeries, alice, watermelon     Arthritis     left knee     Choledocholithiasis     long " after cholecystectomy     Chronic abdominal pain      Chronic constipation      Chronic nausea      Chronic pancreatitis (H)      Degeneration of lumbar or lumbosacral intervertebral disc      Esophageal reflux     w/ hiatal hernia     Gastroparesis      Hiatal hernia      History of pituitary adenoma     s/p resection     Hypothyroidism      Migraines      Mild hyperlipidemia      On tube feeding diet     presence of GJ tube     Pancreatic disease     PD stricture, 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 endometriosis growth     APPENDECTOMY       C  DELIVERY ONLY       C  DELIVERY ONLY      repeat c section with incidental cystotomy with repair     C EXCIS PITUITARY,TRANSNASAL/SEPTAL      pituitary tumor removed for diabetes insipidus     C TOTAL ABDOM HYSTERECTOMY      w/ bilateral salpingoophorectomy       SECTION       COLONOSCOPY       ENDOSCOPIC INSERTION TUBE GASTROSTOMY N/A 2021    Procedure: Gastrojejonostomy placement with jejunopexy, PEG tube exchange;  Surgeon: Zackery Montoya MD;  Location:  OR     ENDOSCOPIC RETROGRADE CHOLANGIOPANCREATOGRAM N/A 2015    Procedure: ENDOSCOPIC RETROGRADE CHOLANGIOPANCREATOGRAM;  Surgeon: Mandeep Park MD;  Location:  OR     ENDOSCOPIC RETROGRADE CHOLANGIOPANCREATOGRAM N/A 2016    Procedure: COMBINED ENDOSCOPIC RETROGRADE CHOLANGIOPANCREATOGRAPHY, PLACE TUBE/STENT;  Surgeon: Mandeep Park MD;  Location:  OR     ENDOSCOPIC RETROGRADE CHOLANGIOPANCREATOGRAM N/A 3/17/2016    Procedure: COMBINED ENDOSCOPIC RETROGRADE CHOLANGIOPANCREATOGRAPHY, REMOVE FOREIGN BODY OR STENT/TUBE;  Surgeon: Mandeep Park MD;  Location:  OR      ENDOSCOPIC RETROGRADE CHOLANGIOPANCREATOGRAM N/A 8/2/2016    Procedure: COMBINED ENDOSCOPIC RETROGRADE CHOLANGIOPANCREATOGRAPHY, PLACE TUBE/STENT;  Surgeon: Mandeep Park MD;  [facility]     ENDOSCOPIC RETROGRADE CHOLANGIOPANCREATOGRAM N/A 8/26/2016    Procedure: COMBINED ENDOSCOPIC RETROGRADE CHOLANGIOPANCREATOGRAPHY, REMOVE FOREIGN BODY OR STENT/TUBE;  Surgeon: Mandeep Park MD;  [facility]     ENDOSCOPIC ULTRASOUND UPPER GASTROINTESTINAL TRACT (GI) N/A 10/3/2016    Procedure: ENDOSCOPIC ULTRASOUND, ESOPHAGOSCOPY / UPPER GASTROINTESTINAL TRACT (GI);  Surgeon: Guru Jose Rodas MD;  [facility]     ESOPHAGOSCOPY, GASTROSCOPY, DUODENOSCOPY (EGD), COMBINED N/A 6/24/2015    Procedure: COMBINED ESOPHAGOSCOPY, GASTROSCOPY, DUODENOSCOPY (EGD), REMOVE FOREIGN BODY;  Surgeon: Mandeep Park MD;  [facility]: [facility] GI     ESOPHAGOSCOPY, GASTROSCOPY, DUODENOSCOPY (EGD), COMBINED N/A 10/25/2015    Procedure: COMBINED ESOPHAGOSCOPY, GASTROSCOPY, DUODENOSCOPY (EGD);  Surgeon: Sammy Amaro MD;  [facility]: [facility]     ESOPHAGOSCOPY, GASTROSCOPY, DUODENOSCOPY (EGD), COMBINED N/A 10/25/2015    Procedure: COMBINED ESOPHAGOSCOPY, GASTROSCOPY, DUODENOSCOPY (EGD), BIOPSY SINGLE OR MULTIPLE;  Surgeon: Sammy Amaro MD;  [facility]: [facility] GI     ESOPHAGOSCOPY, GASTROSCOPY, DUODENOSCOPY (EGD), DILATATION, COMBINED       EXCISE LESION TRUNK N/A 4/17/2017    Procedure: EXCISE LESION TRUNK;  Removal of Abdominal Foreign Body;  Surgeon: Nestor Phoenix MD;  [facility]     HC ESOPH/GAS REFLUX TEST W NASAL IMPED >1 HR N/A 11/19/2015    Procedure: ESOPHAGEAL IMPEDENCE FUNCTION TEST WITH 24 HOUR PH GREATER THAN 1 HOUR;  Surgeon: Thiago Apple MD;  Location: [facility] GI     HC UGI ENDOSCOPY DIAG W BIOPSY  9/17/08     HC UGI ENDOSCOPY DIAG W BIOPSY  9/27/12     HC UGI ENDOSCOPY W ESOPHAGEAL DILATION BALLOON <30MM  9/17/08     HC UGI ENDOSCOPY W EUS N/A 5/5/2015    Procedure:  COMBINED ENDOSCOPIC ULTRASOUND, ESOPHAGOSCOPY, GASTROSCOPY, DUODENOSCOPY (EGD);  Surgeon: Wm Dueñas MD;  [facility]: [facility] GI     HC WRIST ARTHROSCOP,RELEASE XVERS LIG Bilateral 12/17/08     INJECT TRANSVERSUS ABDOMINIS PLANE (TAP) BLOCK BILATERAL Left 9/22/2016    Procedure: INJECT TRANSVERSUS ABDOMINIS PLANE (TAP) BLOCK BILATERAL;  Surgeon: Dickson Corrigan MD;  [facility]     laparoscopic pineda  1995     LAPAROSCOPIC HERNIORRHAPHY INCISIONAL N/A 8/23/2018    Procedure: LAPAROSCOPIC HERNIORRHAPHY INCISIONAL;  Laparoscopic Incisional Hernia Repair with Symbotex Mesh Implant;  Surgeon: Nestor Phoenix MD;  [facility]     LAPAROSCOPIC PANCREATECTOMY, TRANSPLANT AUTO ISLET CELL N/A 12/28/2016    Procedure: LAPAROSCOPIC PANCREATECTOMY, TRANSPLANT AUTO ISLET CELL;  Surgeon: Nestor Phoenix MD;  [facility]     REPLACE GASTROJEJUNOSTOMY TUBE, PERCUTANEOUS N/A 9/7/2021    Procedure: GASTROJEJUNOSTOMY TUBE PLACEMENT, PERCUTANEOUS, WITH GASTROPEXY;  Surgeon: Mandeep Park MD;  [facility]     REPLACE GASTROJEJUNOSTOMY TUBE, PERCUTANEOUS N/A 9/22/2021    Procedure: REPLACEMENT, GASTROJEJUNOSTOMY TUBE, PERCUTANEOUS;  Surgeon: Zackery Montoya MD;  [facility]     REPLACE JEJUNOSTOMY TUBE, PERCUTANEOUS N/A 9/10/2021    Procedure: UPPER ENDOSCOPY, REPLACEMENT OF PERCUTANEOUS GASTROJEJUNOSTOMY TUBE, T-TAG GASTROPEXY;  Surgeon: Zackery Montoya MD;  [facility]     transphenoidal pituitary resection  1990     acetaminophen (TYLENOL) 500 MG tablet  amitriptyline (ELAVIL) 10 MG tablet  amitriptyline (ELAVIL) 50 MG tablet  amylase-lipase-protease (CREON 24) 34579-52464 units CPEP per EC capsule  amylase-lipase-protease (CREON) 12826-91696 units CPEP per EC capsule  azelastine (ASTELIN) 0.1 % nasal spray  blood glucose (PAT CONTOUR NEXT) test strip  blood glucose monitoring (PAT MICROLET) lancets  cetirizine (ZYRTEC) 10 MG tablet  Continuous Blood Gluc Sensor (FREESTYLE LISA 2  SENSOR) MISC  cyclobenzaprine (FLEXERIL) 10 MG tablet  diphenhydrAMINE (BENADRYL ALLERGY) 25 MG capsule  estradiol (VAGIFEM) 10 MCG TABS vaginal tablet  famotidine (PEPCID) 20 MG tablet  Glucagon (GVOKE HYPOPEN 1-PACK) 1 MG/0.2ML SOAJ  glucose 40 % GEL gel  HYDROmorphone, STANDARD CONC, (DILAUDID) 1 MG/ML oral solution  ibuprofen (ADVIL/MOTRIN) 400 MG tablet  levothyroxine (SYNTHROID/LEVOTHROID) 137 MCG tablet  montelukast (SINGULAIR) 10 MG tablet  omeprazole (PRILOSEC) 40 MG DR capsule  order for DME  polyethylene glycol (MIRALAX) 17 GM/Dose powder  prochlorperazine (COMPAZINE) 5 MG tablet  promethazine (PHENERGAN) 25 MG tablet  Prucalopride Succinate (MOTEGRITY) 2 MG TABS  scopolamine (TRANSDERM) 1 MG/3DAYS 72 hr patch  senna-docusate (SENOKOT-S;PERICOLACE) 8.6-50 MG per tablet  Sharps Container (BD SHARPS ) MISC  ZOLMitriptan (ZOMIG) 5 MG tablet      Allergies   Allergen Reactions     Apple Anaphylaxis     Corticosteroids Other (See Comments)     All oral, IV and injectable steroids are contraindicated (unless in life threatening situations) in Islet Auto transplant recipients. They can cause irreversible loss of islet cell function. Please contact patient's transplant care coordinator ADI Gaffney RN at [phone]/pager [phone] and/or endocrinologist prior to administration.       Depakote [Divalproex Sodium] Other (See Comments)     Chest pain     Zithromax [Azithromycin Dihydrate] Anaphylaxis     Noted in 4/7/08 ER     Bromfenac      Other reaction(s): Headache     Codeine      Other reaction(s): Hallucinations     Darvocet [Propoxyphene N-Apap] Itching     Hydromorphone Other (See Comments)     Loopy     Morphine Nausea and Vomiting and Rash     Nalbuphine Hives and Rash     Other reaction(s): Rash  Nubain       Nalbuphine Hcl Rash     RASH :nubaine     Zosyn [Piperacillin-Tazobactam In D5w] Rash     Possible allergy, did have a diffuse rash that seemed drug related but could have also been  related to soap in the hospital.      Bactrim [Sulfamethoxazole W-Trimethoprim] Other (See Comments) and Nausea and Vomiting     Severely low liver function.     Cats      Compazine [Prochlorperazine] Other (See Comments)     Twitching. Takes Benedryl and is fine     Dust Mites      Grass      Ragweeds      Tape [Adhesive Tape] Blisters     Tramadol      Trees      Zofran [Ondansetron] Other (See Comments)     migraine     Flagyl [Metronidazole] Hives and Rash     Family History  Family History   Problem Relation Age of Onset     Eye Disorder Father         cataract, detached retina     Myocardial Infarction Father 60     Lipids Father      Cerebrovascular Disease Father      Depression Father      Substance Abuse Father      Anesthesia Reaction Father         stroke right after surgery     Cataracts Father      Osteoarthritis Father      Ulcerative Colitis Father      Lipids Mother      Hypertension Mother      Thyroid Disease Mother      Depression Mother      Angina Mother      GERD Mother      Skin Cancer Mother      Eye Disorder Son         ptosis     Eye Disorder Paternal Grandmother         cataract     Eye Disorder Paternal Grandfather         cataract     Diabetes Paternal Grandfather      Eye Disorder Maternal Grandmother         cataract     Thyroid Disease Maternal Grandmother      Coronary Artery Disease Sister         angioplasty     GERD Sister      Substance Abuse Sister      Depression Sister      Depression Son      Anxiety Disorder Son      Thyroid Disease Sister      Diabetes Maternal Grandfather      Depression Nephew      Anxiety Disorder Nephew      Thyroid Disease Nephew      Diabetes Type 2  Cousin         paternal cousin     Heart Disease Paternal Aunt      Diabetes Paternal Aunt      Diabetes Paternal Uncle      Heart Disease Paternal Uncle      Social History   Social History     Tobacco Use     Smoking status: Former Smoker     Packs/day: 0.50     Years: 6.00     Pack years: 3.00      Types: Cigarettes     Start date: 1985     Quit date: 1992     Years since quittin.9     Smokeless tobacco: Never Used     Tobacco comment: no 2nd hand   Substance Use Topics     Alcohol use: Not Currently     Alcohol/week: 3.0 - 6.0 standard drinks     Types: 1 - 2 Glasses of wine, 1 - 2 Cans of beer, 1 - 2 Shots of liquor per week     Comment: none since IGG     Drug use: No      Past medical history, past surgical history, medications, allergies, family history, and social history were reviewed with the patient. No additional pertinent items.       Review of Systems   Constitutional: Positive for fatigue. Negative for chills and fever.   HENT: Negative for congestion.    Eyes: Negative for redness and visual disturbance.   Respiratory: Negative for cough and shortness of breath.    Cardiovascular: Negative for chest pain.   Gastrointestinal: Positive for abdominal pain and nausea. Negative for constipation, diarrhea and vomiting.   Endocrine: Negative for polydipsia and polyuria.   Genitourinary: Negative for difficulty urinating, dysuria and frequency.   Musculoskeletal: Negative for arthralgias, neck pain and neck stiffness.   Skin: Negative for color change.   Allergic/Immunologic: Negative for immunocompromised state.   Neurological: Positive for headaches. Negative for light-headedness.   Hematological: Negative for adenopathy. Does not bruise/bleed easily.   Psychiatric/Behavioral: Negative for confusion.   All other systems reviewed and are negative.    A complete review of systems was performed with pertinent positives and negatives noted in the HPI, and all other systems negative.    Physical Exam   BP: 128/84  Pulse: 94  SpO2: 99 %  Physical Exam  Vitals and nursing note reviewed.   Constitutional:       General: She is not in acute distress.     Appearance: Normal appearance. She is ill-appearing. She is not toxic-appearing or diaphoretic.   HENT:      Head: Normocephalic and atraumatic.       Mouth/Throat:      Pharynx: No oropharyngeal exudate.   Eyes:      General: No scleral icterus.     Extraocular Movements: Extraocular movements intact.      Conjunctiva/sclera: Conjunctivae normal.   Cardiovascular:      Rate and Rhythm: Normal rate.      Pulses: Normal pulses.      Heart sounds: Normal heart sounds.   Pulmonary:      Effort: Pulmonary effort is normal. No respiratory distress.      Breath sounds: Normal breath sounds.   Abdominal:      General: There is no distension.      Palpations: Abdomen is soft. There is no mass.      Tenderness: There is abdominal tenderness ( LUQ, LLQ). There is no right CVA tenderness or left CVA tenderness. Negative signs include Campbell's sign and McBurney's sign.      Hernia: No hernia is present.      Comments: G-tube present in right upper abdomen.  J-tube present in left mid abdomen.  No erythema of the skin surrounding the insertion sites   Musculoskeletal:         General: No tenderness. Normal range of motion.      Cervical back: Normal range of motion and neck supple.   Skin:     General: Skin is warm.      Findings: No rash.   Neurological:      General: No focal deficit present.      Mental Status: She is alert and oriented to person, place, and time.      Cranial Nerves: No cranial nerve deficit.      Coordination: Coordination normal.   Psychiatric:         Mood and Affect: Mood normal.         Behavior: Behavior normal.         Thought Content: Thought content normal.         Judgment: Judgment normal.         ED Course      Procedures                   Results for orders placed or performed during the hospital encounter of 12/03/21   CT Abdomen Pelvis w Contrast     Status: None (Preliminary result)    Narrative    EXAMINATION: CT ABDOMEN PELVIS W CONTRAST, 12/3/2021 3:29 PM    INDICATION: Concerned that J-tube is dislodged and the patient as  acute peritonitis.    COMPARISON STUDY: CT 9/18/2021    TECHNIQUE: CT scan of the abdomen and pelvis was  performed following  the administration of IV contrast.    CONTRAST: 81 cc Isovue-250 injected IV with approximately 70 cc of  Omnipaque via llq J tube.    CT scan radiation dose is optimized to minimum requisite dose using  automated dose modulation techniques.    FINDINGS:  The left lower quadrant jejunostomy tube is in place. Enteric contrast  remains within the lumen of the bowel without extraluminal contrast to  suggest perforation. Within the left upper quadrant mesentery there is  a gas and fluid containing loculated and peripherally enhancing  inflammatory collection in cross-section 1.9 x 3 cm (series 5 image  175) with a fluid-filled tubular tract extending from the jejunum  containing the tube for example on series 3 image 29 and axial series  5 image [id number].  No contrast opacification after administration of  enteric contrast.    No free fluid or free air in the abdomen or pelvis. No portal venous  gas. No pneumatosis.     Additional post surgical changes of distal pancreatectomy,  cholecystectomy, and splenectomy. Additional gastrostomy tube is in  place within the distal stomach.    There may be trace pleural effusions with bibasilar atelectasis.  Scattered calcified granulomas. No focal infectious consolidations.    No focal hepatic masses. Post cholecystectomy with  choledochoduodenostomy.  No hydronephrosis or obstructing urinary  calculi. The bladder is well-distended.    Normal caliber of the small and large bowel. No free fluid in the  abdomen or pelvis. No suspicious pelvic masses. Major intra-abdominal  vasculature without focal lesion. No substantial atherosclerotic  calcifications of the abdominal aorta or iliac arteries.    Mixed lytic and sclerotic lesion in the L2 vertebral body. No acute or  suspicious osseous abnormalities. Subcutaneous soft tissue stranding  along the left flank likely related to prior procedure.      Impression    IMPRESSION:   1. New placement of left lower quadrant  percutaneous jejunostomy with  intraluminal enteric contrast confirming intraluminal placement of the  tube.  No extraluminal contrast to suggest perforation.  No  pneumoperitoneum or free fluid.  2. Left mid intraperitoneal contained leak/abscess just deep to the  abdominal wall with fluid tract connecting to superolateral aspect of  the balloon.  3. Postsurgical changes compatible with provided history including  distal pancreatectomy, cholecystectomy, splenectomy, with  choledochoduodenostomy and prior jamel en y gastric bypass.  4. Percutaneous gastrostomy tube is in place.    Impressions #1 and 2 discussed with ED provider Dr. Holman at 3:25 PM  and with Dr Frank of GI at 3:40 PM by Dr Ortiz on 12/3/2021.   Providers verbalized understanding.     Comprehensive metabolic panel     Status: Abnormal   Result Value Ref Range    Sodium 138 133 - 144 mmol/L    Potassium 4.2 3.4 - 5.3 mmol/L    Chloride 104 94 - 109 mmol/L    Carbon Dioxide (CO2) 29 20 - 32 mmol/L    Anion Gap 5 3 - 14 mmol/L    Urea Nitrogen 11 7 - 30 mg/dL    Creatinine 0.55 0.52 - 1.04 mg/dL    Calcium 9.2 8.5 - 10.1 mg/dL    Glucose 115 (H) 70 - 99 mg/dL    Alkaline Phosphatase 104 40 - 150 U/L    AST 11 0 - 45 U/L    ALT 19 0 - 50 U/L    Protein Total 7.1 6.8 - 8.8 g/dL    Albumin 3.0 (L) 3.4 - 5.0 g/dL    Bilirubin Total 0.5 0.2 - 1.3 mg/dL    GFR Estimate >90 >60 mL/min/1.73m2   Lipase     Status: Abnormal   Result Value Ref Range    Lipase <10 (L) 73 - 393 U/L   Lactic acid whole blood     Status: Normal   Result Value Ref Range    Lactic Acid 0.9 0.7 - 2.0 mmol/L   UA with Microscopic reflex to Culture     Status: Abnormal    Specimen: Urine, Clean Catch   Result Value Ref Range    Color Urine Light Yellow Colorless, Straw, Light Yellow, Yellow    Appearance Urine Clear Clear    Glucose Urine Negative Negative mg/dL    Bilirubin Urine Negative Negative    Ketones Urine Negative Negative mg/dL    Specific Gravity Urine 1.014 1.003 - 1.035     Blood Urine Negative Negative    pH Urine 7.0 5.0 - 7.0    Protein Albumin Urine Negative Negative mg/dL    Urobilinogen Urine Normal Normal, 2.0 mg/dL    Nitrite Urine Negative Negative    Leukocyte Esterase Urine Negative Negative    Mucus Urine Present (A) None Seen /LPF    RBC Urine 1 <=2 /HPF    WBC Urine <1 <=5 /HPF    Squamous Epithelials Urine <1 <=1 /HPF    Narrative    Urine Culture not indicated   CBC with platelets and differential     Status: None   Result Value Ref Range    WBC Count 9.6 4.0 - 11.0 10e3/uL    RBC Count 4.12 3.80 - 5.20 10e6/uL    Hemoglobin 13.1 11.7 - 15.7 g/dL    Hematocrit 39.4 35.0 - 47.0 %    MCV 96 78 - 100 fL    MCH 31.8 26.5 - 33.0 pg    MCHC 33.2 31.5 - 36.5 g/dL    RDW 13.5 10.0 - 15.0 %    Platelet Count 439 150 - 450 10e3/uL    % Neutrophils 77 %    % Lymphocytes 13 %    % Monocytes 9 %    % Eosinophils 0 %    % Basophils 1 %    % Immature Granulocytes 0 %    NRBCs per 100 WBC 0 <1 /100    Absolute Neutrophils 7.3 1.6 - 8.3 10e3/uL    Absolute Lymphocytes 1.2 0.8 - 5.3 10e3/uL    Absolute Monocytes 0.9 0.0 - 1.3 10e3/uL    Absolute Eosinophils 0.0 0.0 - 0.7 10e3/uL    Absolute Basophils 0.1 0.0 - 0.2 10e3/uL    Absolute Immature Granulocytes 0.0 <=0.4 10e3/uL    Absolute NRBCs 0.0 10e3/uL   Extra Blue Top Tube     Status: None   Result Value Ref Range    Hold Specimen JIC    Extra Red Top Tube     Status: None   Result Value Ref Range    Hold Specimen JIC    Creatinine POCT     Status: Normal   Result Value Ref Range    Creatinine POCT 0.5 0.5 - 1.0 mg/dL    GFR, ESTIMATED POCT >60 >60 mL/min/1.73m2   CBC with platelets differential     Status: None    Narrative    The following orders were created for panel order CBC with platelets differential.  Procedure                               Abnormality         Status                     ---------                               -----------         ------                     CBC with platelets and d...[[id number]]                       Final result                 Please view results for these tests on the individual orders.   [place] Draw     Status: None    Narrative    The following orders were created for panel order [place] Draw.  Procedure                               Abnormality         Status                     ---------                               -----------         ------                     Extra Blue Top Tube[[id number]]                              Final result               Extra Red Top Tube[[id number]]                               Final result                 Please view results for these tests on the individual orders.     Medications   0.9% sodium chloride BOLUS (0 mLs Intravenous Stopped 12/3/21 1412)     Followed by   sodium chloride 0.9% infusion (has no administration in time range)   lactated ringers infusion (1,000 mLs Intravenous New Bag 12/3/21 1629)   HYDROmorphone (PF) (DILAUDID) injection 0.5 mg (0.5 mg Intravenous Given 12/3/21 1251)   prochlorperazine (COMPAZINE) injection 10 mg (10 mg Intravenous Given 12/3/21 1249)   sodium chloride (PF) 0.9% PF flush 70 mL (70 mLs Intravenous Given 12/3/21 1438)   iopamidol (ISOVUE-370) solution 81 mL (81 mLs Intravenous Given 12/3/21 1437)   ertapenem (INVanz) 1 g vial to attach to  mL bag (1 g Intravenous New Bag 12/3/21 1629)   HYDROmorphone (PF) (DILAUDID) injection 0.5 mg (0.5 mg Intravenous Given 12/3/21 1618)        Assessments & Plan (with Medical Decision Making)     Dinora Loo is a 56-year-old woman with recent J-tube placement presenting with abdominal pain and nausea.  Differential diagnosis: J-tube dislodgment, acute peritonitis, intra-abdominal abscess, SBO, unlikely volvulus.    After thorough history and physical exam patient appears to be in mild distress due to pain and nausea.  I will treat her nausea with IV Compazine and her pain with IV Dilaudid.  Patient also stated she is having a migraine headache and I believe that Compazine will help  with this.  She and her  are agreeable to this treatment.  Laboratory studies will be obtained along with CT abdomen/pelvis for further diagnostic evaluation.    Patient's laboratory studies returned without any evidence of leukocytosis, WBC is normal at 9600. There is no evidence of anemia, hemoglobin is normal at 13.1.  Electrolytes show no evidence of dehydration, creatinine is normal at 0.55.  LFTs and lipase are normal.  Lactic acid is normal 0.9.  Urinalysis shows no evidence of infection.  I reviewed patient's CT abdomen/pelvis and I read the radiology report; there is a fluid collection concerning for an intra-abdominal abscess.  Patient will be given a dose of IV ertapenem.  Dr. Park evaluate the patient emergency department and plan was made to consult general surgery for further evaluation and recommendations.  After surgical evaluation the plan was made to undergo another CT abdomen/pelvis with contrast being administered through the G-tube to look for potential fistulization.    5:16 PM  Patient will be signed out to my partner awaiting CT imaging study and surgical consultation.        This part of the document was transcribed by Bandar Pacheco Scribe.      I have reviewed the nursing notes. I have reviewed the findings, diagnosis, plan and need for follow up with the patient.    New Prescriptions    No medications on file       Final diagnoses:   None     JADEN, Milka Whaley, am serving as a trained medical scribe to document services personally performed by Yoav Holman MD, based on the provider's statements to me.  This document has been checked and approved by the attending provider.     IYoav MD, was physically present and have reviewed and verified the accuracy of this note documented by bandar Petit scribe.       [facility] EMERGENCY DEPARTMENT  12/3/2021     Mika Holman MD  12/03/21 1694

## 2021-12-03 NOTE — CONSULTS
Gastroenterology Consultation  Dinora Mcghee [id number] 56 year old 1965  12/3/2021        Date of Admission: 12/3/2021  Requesting physician: Mika Shelby MD       Reason for Consultation:   Abd pain  History is obtained from the patient         Assessment and Plan:   Dinora Mcghee is a 56 year old female with PMHx of TPAIT w/choledochoduodenostomy, duodenojejunostomy, Vera-Y reconstruction (12/2016). PMHx includes suspected gastroparesis and hypoglycemia, managed by a G-J tube (last exchanged 11/28, RUQ), additional Jtube placed into mid jejunum downstream of the jejunojejunostomy (also on 11/28, LLQ) who presented with abd pain at the new Jtube site with leaking TF formula.    # Abd pain at the new J tube site   #. GJ Tube  #. Gastroparesis  #. Severe protein calorie malnutrition  Chronic issues with gastroparesis requiring a G-J tube - initially placed on 9/7/21. Complicated by occlusions requiring revisions x 3, last of which was 11/28/21 with an additional direct PEJ placed.    Current presentation concerning for intra abdominal infection vs peritonitis. CT showed a PGJ and PEJ tube in good position with all contrast inside the lumen however there is a new inflammatory fluid collection that currently looked contained without any contrast but can be traced back to the PEJ insertion. Abd very tender at Jtube insertion point.          Recommendations:   - Please give 1L LR now  - Abx and the rest of fluid resuscitation per primary team  - Please consult surgery for possible peritonitis and the fluid collection drainage.   - GI will cont to follow    It has been a pleasure to participate in the care of this patient.  Patient discussed with GI staff, Dr. Park.  Please do not hesitate to contact the GI service with any questions or concerns.     Ilia Frank MD  Gastroenterology Fellow  [facility]  Text page [phone]           HPI:   Dinora Mcghee is a 56 year old female with PMHx of  TPAIT w/choledochoduodenostomy, duodenojejunostomy, Vera-Y reconstruction (12/2016). PMHx includes suspected gastroparesis and hypoglycemia, managed by a G-J tube (last exchanged 11/28, RUQ), additional Jtube placed into mid jejunum downstream of the jejunojejunostomy (also on 11/28, LLQ) who presented with abd pain at the new Jtube site with leaking TF formula.      Since J tube on 11/28, it is taking food formula but very uncomfortable whenever increasing the TF rate higher than 45cc/hr. Occasionally, she can feel a hard bulging at the J tub site after feeding. 3 days prior to this presentation she was having worsening pain at the site and drainage at the J tube. TF formula also ooze and trickles out of the J tube. She had nausea and vomiting at home but denied fever, chills. Pt was called to come in to endo suite on 12/3 and seen by Dr. Park. Abdomen felt very tender on exam so she was transferred to ED for further evaluation.     In the ED, afebrile with reassuring vitals signs. Labs showed  CTshowed         Past Medical History:   Reviewed and edited as appropriate  Past Medical History:   Diagnosis Date     Allergic rhinitis, cause unspecified      Allergy to other foods     strawberries, apples, celeries, alice, watermelon     Arthritis     left knee     Choledocholithiasis     long after cholecystectomy     Chronic abdominal pain      Chronic constipation      Chronic nausea      Chronic pancreatitis (H)      Degeneration of lumbar or lumbosacral intervertebral disc      Esophageal reflux     w/ hiatal hernia     Gastroparesis      Hiatal hernia      History of pituitary adenoma     s/p resection     Hypothyroidism      Migraines      Mild hyperlipidemia      On tube feeding diet     presence of GJ tube     Pancreatic disease     PD stricture, 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 endometriosis growth     APPENDECTOMY       C  DELIVERY ONLY       C  DELIVERY ONLY      repeat c section with incidental cystotomy with repair     C EXCIS PITUITARY,TRANSNASAL/SEPTAL  1980    pituitary tumor removed for diabetes insipidus     C TOTAL ABDOM HYSTERECTOMY      w/ bilateral salpingoophorectomy       SECTION       COLONOSCOPY       ENDOSCOPIC INSERTION TUBE GASTROSTOMY N/A 2021    Procedure: Gastrojejonostomy placement with jejunopexy, PEG tube exchange;  Surgeon: Zackery Montoya MD;  [facility]     ENDOSCOPIC RETROGRADE CHOLANGIOPANCREATOGRAM N/A 2015    Procedure: ENDOSCOPIC RETROGRADE CHOLANGIOPANCREATOGRAM;  Surgeon: Mandeep Park MD;  [facility]     ENDOSCOPIC RETROGRADE CHOLANGIOPANCREATOGRAM N/A 2016    Procedure: COMBINED ENDOSCOPIC RETROGRADE CHOLANGIOPANCREATOGRAPHY, PLACE TUBE/STENT;  Surgeon: Mandeep Park MD;  [facility]     ENDOSCOPIC RETROGRADE CHOLANGIOPANCREATOGRAM N/A 3/17/2016    Procedure: COMBINED ENDOSCOPIC RETROGRADE CHOLANGIOPANCREATOGRAPHY, REMOVE FOREIGN BODY OR STENT/TUBE;  Surgeon: Manedep Park MD;  [facility]     ENDOSCOPIC RETROGRADE CHOLANGIOPANCREATOGRAM N/A 2016    Procedure: COMBINED ENDOSCOPIC RETROGRADE CHOLANGIOPANCREATOGRAPHY, PLACE TUBE/STENT;  Surgeon: Mandeep Park MD;  [facility]     ENDOSCOPIC RETROGRADE CHOLANGIOPANCREATOGRAM N/A 2016    Procedure: COMBINED ENDOSCOPIC RETROGRADE CHOLANGIOPANCREATOGRAPHY, REMOVE FOREIGN BODY OR STENT/TUBE;  Surgeon: Mandeep Park MD;  [facility]     ENDOSCOPIC ULTRASOUND UPPER GASTROINTESTINAL TRACT (GI) N/A 10/3/2016    Procedure: ENDOSCOPIC ULTRASOUND, ESOPHAGOSCOPY / UPPER GASTROINTESTINAL  TRACT (GI);  Surgeon: Guru Jose Rodas MD;  [facility]     ESOPHAGOSCOPY, GASTROSCOPY, DUODENOSCOPY (EGD), COMBINED N/A 6/24/2015    Procedure: COMBINED ESOPHAGOSCOPY, GASTROSCOPY, DUODENOSCOPY (EGD), REMOVE FOREIGN BODY;  Surgeon: Mandeep Park MD;  [facility]     ESOPHAGOSCOPY, GASTROSCOPY, DUODENOSCOPY (EGD), COMBINED N/A 10/25/2015    Procedure: COMBINED ESOPHAGOSCOPY, GASTROSCOPY, DUODENOSCOPY (EGD);  Surgeon: Sammy Amaro MD;  [facility]     ESOPHAGOSCOPY, GASTROSCOPY, DUODENOSCOPY (EGD), COMBINED N/A 10/25/2015    Procedure: COMBINED ESOPHAGOSCOPY, GASTROSCOPY, DUODENOSCOPY (EGD), BIOPSY SINGLE OR MULTIPLE;  Surgeon: Sammy Amaro MD;  [facility]     ESOPHAGOSCOPY, GASTROSCOPY, DUODENOSCOPY (EGD), DILATATION, COMBINED       EXCISE LESION TRUNK N/A 4/17/2017    Procedure: EXCISE LESION TRUNK;  Removal of Abdominal Foreign Body;  Surgeon: Nestor Phoenix MD;  [facility]     HC ESOPH/GAS REFLUX TEST W NASAL IMPED >1 HR N/A 11/19/2015    Procedure: ESOPHAGEAL IMPEDENCE FUNCTION TEST WITH 24 HOUR PH GREATER THAN 1 HOUR;  Surgeon: Thiago Apple MD;  [facility]     HC UGI ENDOSCOPY DIAG W BIOPSY  9/17/08     HC UGI ENDOSCOPY DIAG W BIOPSY  9/27/12     HC UGI ENDOSCOPY W ESOPHAGEAL DILATION BALLOON <30MM  9/17/08     HC UGI ENDOSCOPY W EUS N/A 5/5/2015    Procedure: COMBINED ENDOSCOPIC ULTRASOUND, ESOPHAGOSCOPY, GASTROSCOPY, DUODENOSCOPY (EGD);  Surgeon: Wm Dueñas MD;  [facility]     HC WRIST ARTHROSCOP,RELEASE XVERS LIG Bilateral 12/17/08     INJECT TRANSVERSUS ABDOMINIS PLANE (TAP) BLOCK BILATERAL Left 9/22/2016    Procedure: INJECT TRANSVERSUS ABDOMINIS PLANE (TAP) BLOCK BILATERAL;  Surgeon: Dickson Corrigan MD;  [facility]     laparoscopic pineda  1995     LAPAROSCOPIC HERNIORRHAPHY INCISIONAL N/A 8/23/2018    Procedure: LAPAROSCOPIC HERNIORRHAPHY INCISIONAL;  Laparoscopic Incisional Hernia Repair with Symbotex  Mesh Implant;  Surgeon: Nestor Phoenix MD;  [facility]     LAPAROSCOPIC PANCREATECTOMY, TRANSPLANT AUTO ISLET CELL N/A 12/28/2016    Procedure: LAPAROSCOPIC PANCREATECTOMY, TRANSPLANT AUTO ISLET CELL;  Surgeon: Nestor Phoenix MD;  [facility]     REPLACE GASTROJEJUNOSTOMY TUBE, PERCUTANEOUS N/A 9/7/2021    Procedure: GASTROJEJUNOSTOMY TUBE PLACEMENT, PERCUTANEOUS, WITH GASTROPEXY;  Surgeon: Mandeep Park MD;  [facility]     REPLACE GASTROJEJUNOSTOMY TUBE, PERCUTANEOUS N/A 9/22/2021    Procedure: REPLACEMENT, GASTROJEJUNOSTOMY TUBE, PERCUTANEOUS;  Surgeon: Zackery Montoya MD;  [facility]     REPLACE JEJUNOSTOMY TUBE, PERCUTANEOUS N/A 9/10/2021    Procedure: UPPER ENDOSCOPY, REPLACEMENT OF PERCUTANEOUS GASTROJEJUNOSTOMY TUBE, T-TAG GASTROPEXY;  Surgeon: Zackery Montoya MD;  [facility]     transphenoidal pituitary resection  1990            Medications:     Current Facility-Administered Medications   Medication     0.9% sodium chloride BOLUS    Followed by     sodium chloride 0.9% infusion     Current Outpatient Medications   Medication Sig     acetaminophen (TYLENOL) 500 MG tablet Take 2 tablets (1,000 mg) by mouth every 8 hours (Patient taking differently: Take 1,000 mg by mouth every 8 hours as needed )     amitriptyline (ELAVIL) 10 MG tablet Take 10 mg by mouth At Bedtime Take with a 50mg tab for a total of 60mg     amitriptyline (ELAVIL) 50 MG tablet Take 1 tablet (50 mg) by mouth At Bedtime     amylase-lipase-protease (CREON 24) 70862-64729 units CPEP per EC capsule Take 1-2 capsules by mouth Take with snacks or supplements     amylase-lipase-protease (CREON) 44095-77415 units CPEP per EC capsule Take 3-4 capsules by mouth 3 times daily (with meals) With oral meals     azelastine (ASTELIN) 0.1 % nasal spray Spray 1 spray into both nostrils 2 times daily     blood glucose (PAT CONTOUR NEXT) test strip Use to test blood sugar 6 times daily or as directed.     blood  glucose monitoring ([employer]) lancets Use to test blood sugar 6-8 times daily or as directed.     cetirizine (ZYRTEC) 10 MG tablet Take 1 tablet (10 mg) by mouth daily (Patient taking differently: Take 10 mg by mouth every morning )     Continuous Blood Gluc Sensor (FREESTYLE LISA 2 SENSOR) MISC 1 each every 14 days     cyclobenzaprine (FLEXERIL) 10 MG tablet Take 1 tablet (10 mg) by mouth 2 times daily as needed for muscle spasms     diphenhydrAMINE (BENADRYL ALLERGY) 25 MG capsule Take 1 capsule (25 mg) by mouth every 6 hours as needed for itching or allergies     estradiol (VAGIFEM) 10 MCG TABS vaginal tablet INSERT 1 TABLET(10 MCG) VAGINALLY 2 TIMES A WEEK     famotidine (PEPCID) 20 MG tablet Take 1 tablet (20 mg) by mouth 2 times daily (Patient taking differently: Take 20 mg by mouth every morning )     Glucagon (GVOKE HYPOPEN 1-PACK) 1 MG/0.2ML SOAJ Inject 1 mg Subcutaneous once as needed (hypoglycemia with loss of consciousness)     glucose 40 % GEL gel Take 15-30 g by mouth every 15 minutes as needed for low blood sugar     HYDROmorphone, STANDARD CONC, (DILAUDID) 1 MG/ML oral solution Take 1-2 mLs (1-2 mg) by mouth every 4 hours as needed for moderate to severe pain (Post G and J tube placement)     ibuprofen (ADVIL/MOTRIN) 400 MG tablet Take 1 tablet (400 mg) by mouth every 6 hours as needed for moderate pain     levothyroxine (SYNTHROID/LEVOTHROID) 137 MCG tablet Take 1 tablet (137 mcg) by mouth daily     montelukast (SINGULAIR) 10 MG tablet TAKE 1 TABLET(10 MG) BY MOUTH AT BEDTIME     omeprazole (PRILOSEC) 40 MG DR capsule Take 1 capsule (40 mg) by mouth 2 times daily (Patient taking differently: Take 40 mg by mouth every evening )     order for DME Equipment being ordered:Cefaly     polyethylene glycol (MIRALAX) 17 GM/Dose powder Take 17 g (1 capful) by mouth daily (Patient taking differently: Take 1 capful by mouth every morning )     prochlorperazine (COMPAZINE) 5 MG tablet Take 1 tablet (5 mg)  by mouth every 6 hours as needed Take two 5 mg tablets by mouth every six hours as needed for nausea.     promethazine (PHENERGAN) 25 MG tablet Take 1 tablet (25 mg) by mouth daily as needed     Prucalopride Succinate (MOTEGRITY) 2 MG TABS Take 2 mg by mouth daily (Patient taking differently: Take 2 mg by mouth every morning )     scopolamine (TRANSDERM) 1 MG/3DAYS 72 hr patch Place 1 patch onto the skin every 72 hours     senna-docusate (SENOKOT-S;PERICOLACE) 8.6-50 MG per tablet Take 1-2 tablets by mouth 2 times daily (Patient taking differently: Take 1-2 tablets by mouth every morning )     Sharps Container (BD SHARPS ) MISC 1 Container as needed     ZOLMitriptan (ZOMIG) 5 MG tablet Take 1 tablet (5 mg) by mouth at onset of headache for migraine            Allergies      Allergies   Allergen Reactions     Apple Anaphylaxis     Corticosteroids Other (See Comments)     All oral, IV and injectable steroids are contraindicated (unless in life threatening situations) in [facility] [employer] transplant recipients. They can cause irreversible loss of islet cell function. Please contact patient's transplant care coordinator ADI Gaffney RN at [phone]/pager [phone] and/or endocrinologist prior to administration.       Depakote [Divalproex Sodium] Other (See Comments)     Chest pain     Zithromax [Azithromycin Dihydrate] Anaphylaxis     Noted in 4/7/08 ER     Bromfenac      Other reaction(s): Headache     Codeine      Other reaction(s): Hallucinations     Darvocet [Propoxyphene N-Apap] Itching     Hydromorphone Other (See Comments)     Loopy     Morphine Nausea and Vomiting and Rash     Nalbuphine Hives and Rash     Other reaction(s): Rash  Nubain       Nalbuphine Hcl Rash     RASH :nubaine     Zosyn [Piperacillin-Tazobactam In D5w] Rash     Possible allergy, did have a diffuse rash that seemed drug related but could have also been related to soap in the hospital.      Bactrim [Sulfamethoxazole W-Trimethoprim]  Other (See Comments) and Nausea and Vomiting     Severely low liver function.     Cats      Compazine [Prochlorperazine] Other (See Comments)     Twitching. Takes Benedryl and is fine     Dust Mites      Grass      Ragweeds      Tape [Adhesive Tape] Blisters     Tramadol      Trees      Zofran [Ondansetron] Other (See Comments)     migraine     Flagyl [Metronidazole] Hives and Rash            Social History:   Reviewed and edited as appropriate  Social History     Socioeconomic History     Marital status:      Spouse name: Norris     Number of children: 3     Years of education: 16     Highest education level: Not on file   Occupational History     Occupation: director     Employer: [employer]   Tobacco Use     Smoking status: Former Smoker     Packs/day: 0.50     Years: 6.00     Pack years: 3.00     Types: Cigarettes     Start date: 1985     Quit date: 1992     Years since quittin.9     Smokeless tobacco: Never Used     Tobacco comment: no 2nd hand   Substance and Sexual Activity     Alcohol use: Not Currently     Alcohol/week: 3.0 - 6.0 standard drinks     Types: 1 - 2 Glasses of wine, 1 - 2 Cans of beer, 1 - 2 Shots of liquor per week     Comment: none since IGG     Drug use: No     Sexual activity: Yes     Partners: Male     Birth control/protection: None     Comment: Hystectomy    Other Topics Concern     Parent/sibling w/ CABG, MI or angioplasty before 65F 55M? No      Service Not Asked     Blood Transfusions No     Caffeine Concern Not Asked     Occupational Exposure Not Asked     Hobby Hazards Not Asked     Sleep Concern Not Asked     Stress Concern Not Asked     Weight Concern Not Asked     Special Diet Not Asked     Back Care Not Asked     Exercise Not Asked     Bike Helmet Not Asked     Seat Belt Yes     Self-Exams Not Asked   Social History Narrative     with 3 children and a dog.  No smoking, etoh or drug use.  Worked as a  for [employer] in the  past.  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     Social Determinants of Health     Financial Resource Strain: Not on file   Food Insecurity: Not on file   Transportation Needs: Not on file   Physical Activity: Not on file   Stress: Not on file   Social Connections: Not on file   Intimate Partner Violence: Not on file   Housing Stability: Not on file           Family History:   Reviewed and edited as appropriate  Family History   Problem Relation Age of Onset     Eye Disorder Father         cataract, detached retina     Myocardial Infarction Father 60     Lipids Father      Cerebrovascular Disease Father      Depression Father      Substance Abuse Father      Anesthesia Reaction Father         stroke right after surgery     Cataracts Father      Osteoarthritis Father      Ulcerative Colitis Father      Lipids Mother      Hypertension Mother      Thyroid Disease Mother      Depression Mother      Angina Mother      GERD Mother      Skin Cancer Mother      Eye Disorder Son         ptosis     Eye Disorder Paternal Grandmother         cataract     Eye Disorder Paternal Grandfather         cataract     Diabetes Paternal Grandfather      Eye Disorder Maternal Grandmother         cataract     Thyroid Disease Maternal Grandmother      Coronary Artery Disease Sister         angioplasty     GERD Sister      Substance Abuse Sister      Depression Sister      Depression Son      Anxiety Disorder Son      Thyroid Disease Sister      Diabetes Maternal Grandfather      Depression Nephew      Anxiety Disorder Nephew      Thyroid Disease Nephew      Diabetes Type 2  Cousin         paternal cousin     Heart Disease Paternal Aunt      Diabetes Paternal Aunt      Diabetes Paternal Uncle      Heart Disease Paternal Uncle      No known history of colorectal cancer, liver disease, or inflammatory bowel disease.         Review of Systems:   A complete 10 point review of systems was obtained.   Please see the HPI for pertinent positives and negatives.  All other systems were reviewed and were found to be negative.          Physical Exam:   VS:  /84 (BP Location: Right arm)   Pulse 94   SpO2 99%     Wt:   Wt Readings from Last 2 Encounters:   11/28/21 60.3 kg (133 lb)   11/03/21 64 kg (141 lb)      Constitutional: Lying in bed, looks tired but non toxic.   Eyes: Conjunctiva anicteric  HEENT: Normal oropharynx without ulcers or exudate, mucus membranes moist  CV: No Tessa edema  Respiratory: Breathing comfortably on ambient air  Abd: Venting Gtube in the RUQ.            PEJ in the LLQ. The site had no superficial infection but the entire left abdomen very          tender to light palpation.   Skin: warm, perfused, no jaundice  Neuro: A&O x 3, No asterixis         Laboratory:   BMP  Recent Labs   Lab 12/03/21  1229 11/28/21  1353 11/28/21  1015 11/28/21  0817 11/28/21  0620 11/28/21  0455 11/27/21  1848   NA  --   --   --   --  141  --  139   POTASSIUM  --   --   --   --  3.8  --  3.9   CHLORIDE  --   --   --   --  109  --  106   KASSI  --   --   --   --  8.4*  --  9.1   CO2  --   --   --   --  29  --  29   BUN  --   --   --   --  11  --  12   CR 0.5  --   --   --  0.67  --  0.68   GLC  --  76 103* 95 104*   < > 88    < > = values in this interval not displayed.     CBC  Recent Labs   Lab 11/28/21 0620 11/27/21 1848   WBC 4.4 5.5   RBC 3.60* 4.25   HGB 12.3 13.6   HCT 35.3 41.3   MCV 98 97   MCH 34.2* 32.0   MCHC 34.8 32.9   RDW 13.8 13.7    398     INR  Recent Labs   Lab 11/28/21 0620 11/27/21  1848   INR 1.14 1.04     Liver Enzymes  Recent Labs   Lab 11/28/21 0620 11/27/21 1848   ALKPHOS 68 92   AST 26 21   ALT 28 32   BILITOTAL 1.0 0.6   PROTTOTAL 5.8* 7.3   ALBUMIN 2.7* 3.7      PANCNo lab results found in last 7 days.    Imaging/Procedures/Studies:

## 2021-12-03 NOTE — CONSULTS
[facility] Surgery Consultation    Dinora Mgchee MRN# [id number]   Age: 56 year old YOB: 1965     Date of Admission:  12/3/2021    Date of Consult:   12/03/21    Reason for consult: Abdominal pain       Requesting service: Dr. Nahun MD                   Assessment and Plan:   Assessment:   Dinora Mcghee is a 56 year old female with PMHx of TPAIT w/choledochoduodenostomy, duodenojejunostomy, Vera-Y reconstruction with Dr. Phoenix(12/2016- now follows Dr. Guerra). PMHx includes suspected gastroparesis and hypoglycemia, managed by a G-J tube (last exchanged 11/28, RUQ), additional Jtube placed into mid jejunum downstream of the jejunojejunostomy (also on 11/28, LLQ) who presented with abd pain at the new J-tube site with leaking TF formula. CT-ABD pelvis notes a possible contained leak/abscess just deep to the abdominal wall with fluid tract connecting to superolateral aspect of the balloon. J tube is still within the jejunum. Clinically, patients patients abdomen is focally tender around left side of abdomen around J tube. Right side of abdomen is non-tender, soft.         Plan:       - Admit to Surgery  - Serial abdominal exam- q4 x 3  - NPO, IVF  - Continue Ertapenem q24  - IR consult for abscess drainage  - Pink lady enema  - Restarted PTA meds    Discussed and seen patient with chief Dr. Najera and staff, Dr. Anderson.     SADIQ Jackson, MS  Surgery, PGY-2            Chief Complaint:     - Abdominal Pain         History of Present Illness:     Dinora Mcghee is a 56 year old female with PMHx of TPAIT w/choledochoduodenostomy, duodenojejunostomy, Vera-Y reconstruction with Dr. Phoenix(12/2016- now follows Dr. Guerra). PMHx includes suspected gastroparesis and hypoglycemia, managed by a G-J tube (last exchanged 11/28, RUQ), additional Jtube placed into mid jejunum downstream of the jejunojejunostomy (also on 11/28, LLQ) who presented with abd pain at the new J-tube site with leaking TF  formula.    Patent states that after her J-tube placement on the  of last month she has felt some discomfort while taking her tube feeds. Since Tuesday, patient states that she is having increasing pain and nausea. She has vomited x3. She also complains of increasing drainage around her J tube. She notes that her drainage resembles tube feeds and that she initially had to change approximately 1 4x4 every 3 hours and now she changes 1 4x4 every 2 hour. Patient denies fevers, chills, SOB, chest pain, dysuria. Patient is ambulating.          Past Medical History:     Past Medical History:   Diagnosis Date     Allergic rhinitis, cause unspecified      Allergy to other foods     strawberries, apples, celeries, alice, watermelon     Arthritis     left knee     Choledocholithiasis     long after cholecystectomy     Chronic abdominal pain      Chronic constipation      Chronic nausea      Chronic pancreatitis (H)      Degeneration of lumbar or lumbosacral intervertebral disc      Esophageal reflux     w/ hiatal hernia     Gastroparesis      Hiatal hernia      History of pituitary adenoma     s/p resection     Hypothyroidism      Migraines      Mild hyperlipidemia      On tube feeding diet     presence of GJ tube     Pancreatic disease     PD stricture, 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 endometriosis growth     APPENDECTOMY       C  DELIVERY ONLY       C  DELIVERY ONLY      repeat c section with incidental cystotomy with repair     C EXCIS PITUITARY,TRANSNASAL/SEPTAL      pituitary tumor removed for diabetes insipidus     C TOTAL ABDOM HYSTERECTOMY      w/ bilateral salpingoophorectomy       SECTION   1993     COLONOSCOPY  2014     ENDOSCOPIC INSERTION TUBE GASTROSTOMY N/A 11/28/2021    Procedure: Gastrojejonostomy placement with jejunopexy, PEG tube exchange;  Surgeon: Zackery Montoya MD;  [facility]     ENDOSCOPIC RETROGRADE CHOLANGIOPANCREATOGRAM N/A 6/2/2015    Procedure: ENDOSCOPIC RETROGRADE CHOLANGIOPANCREATOGRAM;  Surgeon: Mandeep Park MD;  [facility]     ENDOSCOPIC RETROGRADE CHOLANGIOPANCREATOGRAM N/A 2/9/2016    Procedure: COMBINED ENDOSCOPIC RETROGRADE CHOLANGIOPANCREATOGRAPHY, PLACE TUBE/STENT;  Surgeon: Mandeep Park MD;  [facility]     ENDOSCOPIC RETROGRADE CHOLANGIOPANCREATOGRAM N/A 3/17/2016    Procedure: COMBINED ENDOSCOPIC RETROGRADE CHOLANGIOPANCREATOGRAPHY, REMOVE FOREIGN BODY OR STENT/TUBE;  Surgeon: Mandeep Park MD;  [facility]     ENDOSCOPIC RETROGRADE CHOLANGIOPANCREATOGRAM N/A 8/2/2016    Procedure: COMBINED ENDOSCOPIC RETROGRADE CHOLANGIOPANCREATOGRAPHY, PLACE TUBE/STENT;  Surgeon: Mandeep Park MD;  [facility]     ENDOSCOPIC RETROGRADE CHOLANGIOPANCREATOGRAM N/A 8/26/2016    Procedure: COMBINED ENDOSCOPIC RETROGRADE CHOLANGIOPANCREATOGRAPHY, REMOVE FOREIGN BODY OR STENT/TUBE;  Surgeon: Mandeep Park MD;  [facility]     ENDOSCOPIC ULTRASOUND UPPER GASTROINTESTINAL TRACT (GI) N/A 10/3/2016    Procedure: ENDOSCOPIC ULTRASOUND, ESOPHAGOSCOPY / UPPER GASTROINTESTINAL TRACT (GI);  Surgeon: Guru Jose Rodas MD;  [facility]     ESOPHAGOSCOPY, GASTROSCOPY, DUODENOSCOPY (EGD), COMBINED N/A 6/24/2015    Procedure: COMBINED ESOPHAGOSCOPY, GASTROSCOPY, DUODENOSCOPY (EGD), REMOVE FOREIGN BODY;  Surgeon: Mandeep Park MD;  [facility]     ESOPHAGOSCOPY, GASTROSCOPY, DUODENOSCOPY (EGD), COMBINED N/A 10/25/2015    Procedure: COMBINED ESOPHAGOSCOPY, GASTROSCOPY, DUODENOSCOPY (EGD);  Surgeon: Sammy Amaro MD;  [facility]     ESOPHAGOSCOPY, GASTROSCOPY, DUODENOSCOPY  (EGD), COMBINED N/A 10/25/2015    Procedure: COMBINED ESOPHAGOSCOPY, GASTROSCOPY, DUODENOSCOPY (EGD), BIOPSY SINGLE OR MULTIPLE;  Surgeon: Sammy Amaro MD;  [facility]     ESOPHAGOSCOPY, GASTROSCOPY, DUODENOSCOPY (EGD), DILATATION, COMBINED       EXCISE LESION TRUNK N/A 4/17/2017    Procedure: EXCISE LESION TRUNK;  Removal of Abdominal Foreign Body;  Surgeon: Nestor Phoenix MD;  [facility]     HC ESOPH/GAS REFLUX TEST W NASAL IMPED >1 HR N/A 11/19/2015    Procedure: ESOPHAGEAL IMPEDENCE FUNCTION TEST WITH 24 HOUR PH GREATER THAN 1 HOUR;  Surgeon: Thiago Apple MD;  Location[facility]     HC UGI ENDOSCOPY DIAG W BIOPSY  9/17/08     HC UGI ENDOSCOPY DIAG W BIOPSY  9/27/12     HC UGI ENDOSCOPY W ESOPHAGEAL DILATION BALLOON <30MM  9/17/08     HC UGI ENDOSCOPY W EUS N/A 5/5/2015    Procedure: COMBINED ENDOSCOPIC ULTRASOUND, ESOPHAGOSCOPY, GASTROSCOPY, DUODENOSCOPY (EGD);  Surgeon: Wm Dueñas MD;  [facility]: [facility]     HC WRIST ARTHROSCOP,RELEASE XVERS LIG Bilateral 12/17/08     INJECT TRANSVERSUS ABDOMINIS PLANE (TAP) BLOCK BILATERAL Left 9/22/2016    Procedure: INJECT TRANSVERSUS ABDOMINIS PLANE (TAP) BLOCK BILATERAL;  Surgeon: Dickson Corrigan MD;  [facility]     laparoscopic pineda  1995     LAPAROSCOPIC HERNIORRHAPHY INCISIONAL N/A 8/23/2018    Procedure: LAPAROSCOPIC HERNIORRHAPHY INCISIONAL;  Laparoscopic Incisional Hernia Repair with Symbotex Mesh Implant;  Surgeon: Nestor Phoenix MD;  [facility]     LAPAROSCOPIC PANCREATECTOMY, TRANSPLANT AUTO ISLET CELL N/A 12/28/2016    Procedure: LAPAROSCOPIC PANCREATECTOMY, TRANSPLANT AUTO ISLET CELL;  Surgeon: Nestor Phoenix MD;  [facility]     REPLACE GASTROJEJUNOSTOMY TUBE, PERCUTANEOUS N/A 9/7/2021    Procedure: GASTROJEJUNOSTOMY TUBE PLACEMENT, PERCUTANEOUS, WITH GASTROPEXY;  Surgeon: Mandeep Park MD;  [facility]     REPLACE GASTROJEJUNOSTOMY TUBE, PERCUTANEOUS N/A 9/22/2021    Procedure: REPLACEMENT,  GASTROJEJUNOSTOMY TUBE, PERCUTANEOUS;  Surgeon: Zackery Montoya MD;  [facility]     REPLACE JEJUNOSTOMY TUBE, PERCUTANEOUS N/A 9/10/2021    Procedure: UPPER ENDOSCOPY, REPLACEMENT OF PERCUTANEOUS GASTROJEJUNOSTOMY TUBE, T-TAG GASTROPEXY;  Surgeon: Zackery Montoya MD;  [facility]     transphenoidal pituitary resection               Social History:     Social History     Tobacco Use     Smoking status: Former Smoker     Packs/day: 0.50     Years: 6.00     Pack years: 3.00     Types: Cigarettes     Start date: 1985     Quit date: 1992     Years since quittin.9     Smokeless tobacco: Never Used     Tobacco comment: no 2nd hand   Substance Use Topics     Alcohol use: Not Currently     Alcohol/week: 3.0 - 6.0 standard drinks     Types: 1 - 2 Glasses of wine, 1 - 2 Cans of beer, 1 - 2 Shots of liquor per week     Comment: none since IGG             Family History:     Family History   Problem Relation Age of Onset     Eye Disorder Father         cataract, detached retina     Myocardial Infarction Father 60     Lipids Father      Cerebrovascular Disease Father      Depression Father      Substance Abuse Father      Anesthesia Reaction Father         stroke right after surgery     Cataracts Father      Osteoarthritis Father      Ulcerative Colitis Father      Lipids Mother      Hypertension Mother      Thyroid Disease Mother      Depression Mother      Angina Mother      GERD Mother      Skin Cancer Mother      Eye Disorder Son         ptosis     Eye Disorder Paternal Grandmother         cataract     Eye Disorder Paternal Grandfather         cataract     Diabetes Paternal Grandfather      Eye Disorder Maternal Grandmother         cataract     Thyroid Disease Maternal Grandmother      Coronary Artery Disease Sister         angioplasty     GERD Sister      Substance Abuse Sister      Depression Sister      Depression Son      Anxiety Disorder Son      Thyroid Disease Sister       Diabetes Maternal Grandfather      Depression Nephew      Anxiety Disorder Nephew      Thyroid Disease Nephew      Diabetes Type 2  Cousin         paternal cousin     Heart Disease Paternal Aunt      Diabetes Paternal Aunt      Diabetes Paternal Uncle      Heart Disease Paternal Uncle                 Allergies:     Allergies   Allergen Reactions     Apple Anaphylaxis     Corticosteroids Other (See Comments)     All oral, IV and injectable steroids are contraindicated (unless in life threatening situations) in [facility] transplant recipients. They can cause irreversible loss of islet cell function. Please contact patient's transplant care coordinator ADI Gaffney RN at [phone]/pager [phone] and/or endocrinologist prior to administration.       Depakote [Divalproex Sodium] Other (See Comments)     Chest pain     Zithromax [Azithromycin Dihydrate] Anaphylaxis     Noted in 4/7/08 ER     Bromfenac      Other reaction(s): Headache     Codeine      Other reaction(s): Hallucinations     Darvocet [Propoxyphene N-Apap] Itching     Hydromorphone Other (See Comments)     Loopy     Morphine Nausea and Vomiting and Rash     Nalbuphine Hives and Rash     Other reaction(s): Rash  Nubain       Nalbuphine Hcl Rash     RASH :nubaine     Zosyn [Piperacillin-Tazobactam In D5w] Rash     Possible allergy, did have a diffuse rash that seemed drug related but could have also been related to soap in the hospital.      Bactrim [Sulfamethoxazole W-Trimethoprim] Other (See Comments) and Nausea and Vomiting     Severely low liver function.     Cats      Compazine [Prochlorperazine] Other (See Comments)     Twitching. Takes Benedryl and is fine     Dust Mites      Grass      Ragweeds      Tape [Adhesive Tape] Blisters     Tramadol      Trees      Zofran [Ondansetron] Other (See Comments)     migraine     Flagyl [Metronidazole] Hives and Rash             Medications:     Current Facility-Administered Medications   Medication      lactated ringers infusion     sodium chloride 0.9% infusion     Current Outpatient Medications   Medication Sig     acetaminophen (TYLENOL) 500 MG tablet Take 2 tablets (1,000 mg) by mouth every 8 hours (Patient taking differently: Take 1,000 mg by mouth every 8 hours as needed )     amitriptyline (ELAVIL) 10 MG tablet Take 10 mg by mouth At Bedtime Take with a 50mg tab for a total of 60mg     amitriptyline (ELAVIL) 50 MG tablet Take 1 tablet (50 mg) by mouth At Bedtime     amylase-lipase-protease (CREON 24) 44490-66049 units CPEP per EC capsule Take 1-2 capsules by mouth Take with snacks or supplements     amylase-lipase-protease (CREON) 94451-53601 units CPEP per EC capsule Take 3-4 capsules by mouth 3 times daily (with meals) With oral meals     azelastine (ASTELIN) 0.1 % nasal spray Spray 1 spray into both nostrils 2 times daily     blood glucose (PAT CONTOUR NEXT) test strip Use to test blood sugar 6 times daily or as directed.     blood glucose monitoring (PAT MICROLET) lancets Use to test blood sugar 6-8 times daily or as directed.     cetirizine (ZYRTEC) 10 MG tablet Take 1 tablet (10 mg) by mouth daily (Patient taking differently: Take 10 mg by mouth every morning )     Continuous Blood Gluc Sensor (FREESTYLE LISA 2 SENSOR) MISC 1 each every 14 days     cyclobenzaprine (FLEXERIL) 10 MG tablet Take 1 tablet (10 mg) by mouth 2 times daily as needed for muscle spasms     diphenhydrAMINE (BENADRYL ALLERGY) 25 MG capsule Take 1 capsule (25 mg) by mouth every 6 hours as needed for itching or allergies     estradiol (VAGIFEM) 10 MCG TABS vaginal tablet INSERT 1 TABLET(10 MCG) VAGINALLY 2 TIMES A WEEK     famotidine (PEPCID) 20 MG tablet Take 1 tablet (20 mg) by mouth 2 times daily (Patient taking differently: Take 20 mg by mouth every morning )     Glucagon (GVOKE HYPOPEN 1-PACK) 1 MG/0.2ML SOAJ Inject 1 mg Subcutaneous once as needed (hypoglycemia with loss of consciousness)     glucose 40 % GEL gel Take  15-30 g by mouth every 15 minutes as needed for low blood sugar     HYDROmorphone, STANDARD CONC, (DILAUDID) 1 MG/ML oral solution Take 1-2 mLs (1-2 mg) by mouth every 4 hours as needed for moderate to severe pain (Post G and J tube placement)     ibuprofen (ADVIL/MOTRIN) 400 MG tablet Take 1 tablet (400 mg) by mouth every 6 hours as needed for moderate pain     levothyroxine (SYNTHROID/LEVOTHROID) 137 MCG tablet Take 1 tablet (137 mcg) by mouth daily     montelukast (SINGULAIR) 10 MG tablet TAKE 1 TABLET(10 MG) BY MOUTH AT BEDTIME     omeprazole (PRILOSEC) 40 MG DR capsule Take 1 capsule (40 mg) by mouth 2 times daily (Patient taking differently: Take 40 mg by mouth every evening )     order for DME Equipment being ordered:Cefaly     polyethylene glycol (MIRALAX) 17 GM/Dose powder Take 17 g (1 capful) by mouth daily (Patient taking differently: Take 1 capful by mouth every morning )     prochlorperazine (COMPAZINE) 5 MG tablet Take 1 tablet (5 mg) by mouth every 6 hours as needed Take two 5 mg tablets by mouth every six hours as needed for nausea.     promethazine (PHENERGAN) 25 MG tablet Take 1 tablet (25 mg) by mouth daily as needed     Prucalopride Succinate (MOTEGRITY) 2 MG TABS Take 2 mg by mouth daily (Patient taking differently: Take 2 mg by mouth every morning )     scopolamine (TRANSDERM) 1 MG/3DAYS 72 hr patch Place 1 patch onto the skin every 72 hours     senna-docusate (SENOKOT-S;PERICOLACE) 8.6-50 MG per tablet Take 1-2 tablets by mouth 2 times daily (Patient taking differently: Take 1-2 tablets by mouth every morning )     Sharps Container (BD SHARPS ) MISC 1 Container as needed     ZOLMitriptan (ZOMIG) 5 MG tablet Take 1 tablet (5 mg) by mouth at onset of headache for migraine               Review of Systems:   CV: NEGATIVE for chest pain, palpitations or peripheral edema  C: NEGATIVE for fever, chills, change in weight  E/M: NEGATIVE for ear, mouth and throat problems  R: NEGATIVE for  significant cough or SOB          Physical Exam:   All vitals have been reviewed  Pulse:  [94-96] 96  BP: (113-128)/(71-84) 113/71  SpO2:  [99 %] 99 %  No intake or output data in the 24 hours ending 12/03/21 1718  Physical Exam:  Gen: NAD, in mild distress  Resp: ULB  CV: Tachycardia  Abd: Abdomen is distended, and is focally tender around left side of abdomen around J tube. Right side of abdomen is non-tender, soft.  Ext: WWP         Data:   All laboratory data reviewed    Results:  BMP  Recent Labs   Lab 12/03/21  1229 12/03/21  1227 11/28/21  1353 11/28/21  1015 11/28/21  0817 11/28/21  0620 11/28/21  0455 11/27/21  1848   NA  --  138  --   --   --  141  --  139   POTASSIUM  --  4.2  --   --   --  3.8  --  3.9   CHLORIDE  --  104  --   --   --  109  --  106   CO2  --  29  --   --   --  29  --  29   BUN  --  11  --   --   --  11  --  12   CR 0.5 0.55  --   --   --  0.67  --  0.68   GLC  --  115* 76 103* 95 104*   < > 88    < > = values in this interval not displayed.     CBC  Recent Labs   Lab 12/03/21  1227 11/28/21  0620 11/27/21  1848   WBC 9.6 4.4 5.5   HGB 13.1 12.3 13.6    375 398     LFT  Recent Labs   Lab 12/03/21  1227 11/28/21  0620 11/27/21  1848   AST 11 26 21   ALT 19 28 32   ALKPHOS 104 68 92   BILITOTAL 0.5 1.0 0.6   ALBUMIN 3.0* 2.7* 3.7   INR  --  1.14 1.04     Recent Labs   Lab 12/03/21  1227 11/28/21  1353 11/28/21  1015 11/28/21  0817 11/28/21  0620 11/28/21  0455   * 76 103* 95 104* 102*       Imaging:  XR Abdomen Port 1 View    Result Date: 11/27/2021  EXAM: XR ABDOMEN PORT 1 VIEWS LOCATION: [facility] DATE/TIME: 11/27/2021 9:46 PM INDICATION: GJ tube clogged COMPARISON: 10/21/2021.     IMPRESSION: Percutaneous gastrojejunostomy tube tip terminates over the proximal jejunum, though there is a 5 cm length of the catheter tubing proximally that appears redundant, coiled, and possibly twisted. This may affect tube function. Moderate  amount  of formed colonic stool. No visible small bowel dilatation. Numerous surgical clips in the upper abdomen.    XR Surgery GAMA Fluoro L/T 5 Min w Stills    Result Date: 11/28/2021  This exam was marked as non-reportable because it will not be read by a radiologist or a [place] non-radiologist provider.     CT Abdomen Pelvis w Contrast    Result Date: 12/3/2021  EXAMINATION: CT ABDOMEN PELVIS W CONTRAST, 12/3/2021 3:29 PM INDICATION: Concerned that J-tube is dislodged and the patient as acute peritonitis. COMPARISON STUDY: CT 9/18/2021 TECHNIQUE: CT scan of the abdomen and pelvis was performed following the administration of IV contrast. CONTRAST: 81 cc Isovue-250 injected IV with approximately 70 cc of Omnipaque via llq J tube. CT scan radiation dose is optimized to minimum requisite dose using automated dose modulation techniques. FINDINGS: The left lower quadrant jejunostomy tube is in place. Enteric contrast remains within the lumen of the bowel without extraluminal contrast to suggest perforation. Within the left upper quadrant mesentery there is a gas and fluid containing loculated and peripherally enhancing inflammatory collection in cross-section 1.9 x 3 cm (series 5 image 175) with a fluid-filled tubular tract extending from the jejunum containing the tube for example on series 3 image 29 and axial series 5 image [id number].  No contrast opacification after administration of enteric contrast. No free fluid or free air in the abdomen or pelvis. No portal venous gas. No pneumatosis. Additional post surgical changes of distal pancreatectomy, cholecystectomy, and splenectomy. Additional gastrostomy tube is in place within the distal stomach. There may be trace pleural effusions with bibasilar atelectasis. Scattered calcified granulomas. No focal infectious consolidations. No focal hepatic masses. Post cholecystectomy with choledochoduodenostomy.  No hydronephrosis or obstructing urinary calculi. The bladder  is well-distended. Normal caliber of the small and large bowel. No free fluid in the abdomen or pelvis. No suspicious pelvic masses. Major intra-abdominal vasculature without focal lesion. No substantial atherosclerotic calcifications of the abdominal aorta or iliac arteries. Mixed lytic and sclerotic lesion in the L2 vertebral body. No acute or suspicious osseous abnormalities. Subcutaneous soft tissue stranding along the left flank likely related to prior procedure.     IMPRESSION: 1. New placement of left lower quadrant percutaneous jejunostomy with intraluminal enteric contrast confirming intraluminal placement of the tube.  No extraluminal contrast to suggest perforation.  No pneumoperitoneum or free fluid. 2. Left mid intraperitoneal contained leak/abscess just deep to the abdominal wall with fluid tract connecting to superolateral aspect of the balloon. 3. Postsurgical changes compatible with provided history including distal pancreatectomy, cholecystectomy, splenectomy, with choledochoduodenostomy and prior jamel en y gastric bypass. 4. Percutaneous gastrostomy tube is in place. Impressions #1 and 2 discussed with ED provider Dr. Holman at 3:25 PM and with Dr Frank of GI at 3:40 PM by Dr Ortiz on 12/3/2021. Providers verbalized understanding.       Jamie Lyons MD

## 2021-12-04 LAB
ANION GAP SERPL CALCULATED.3IONS-SCNC: 6 MMOL/L (ref 3–14)
BUN SERPL-MCNC: 8 MG/DL (ref 7–30)
CALCIUM SERPL-MCNC: 9.1 MG/DL (ref 8.5–10.1)
CHLORIDE BLD-SCNC: 103 MMOL/L (ref 94–109)
CO2 SERPL-SCNC: 29 MMOL/L (ref 20–32)
CREAT SERPL-MCNC: 0.43 MG/DL (ref 0.52–1.04)
ERYTHROCYTE [DISTWIDTH] IN BLOOD BY AUTOMATED COUNT: 13.3 % (ref 10–15)
GFR SERPL CREATININE-BSD FRML MDRD: >90 ML/MIN/1.73M2
GLUCOSE BLD-MCNC: 111 MG/DL (ref 70–99)
GLUCOSE BLDC GLUCOMTR-MCNC: 120 MG/DL (ref 70–99)
GLUCOSE BLDC GLUCOMTR-MCNC: 63 MG/DL (ref 70–99)
GLUCOSE BLDC GLUCOMTR-MCNC: 65 MG/DL (ref 70–99)
GLUCOSE BLDC GLUCOMTR-MCNC: 75 MG/DL (ref 70–99)
GLUCOSE BLDC GLUCOMTR-MCNC: 83 MG/DL (ref 70–99)
GLUCOSE BLDC GLUCOMTR-MCNC: 87 MG/DL (ref 70–99)
GLUCOSE BLDC GLUCOMTR-MCNC: 89 MG/DL (ref 70–99)
GLUCOSE BLDC GLUCOMTR-MCNC: 89 MG/DL (ref 70–99)
GLUCOSE BLDC GLUCOMTR-MCNC: 96 MG/DL (ref 70–99)
HCT VFR BLD AUTO: 35.3 % (ref 35–47)
HGB BLD-MCNC: 11.6 G/DL (ref 11.7–15.7)
INR PPP: 1.05 (ref 0.85–1.15)
MAGNESIUM SERPL-MCNC: 1.9 MG/DL (ref 1.6–2.3)
MCH RBC QN AUTO: 32 PG (ref 26.5–33)
MCHC RBC AUTO-ENTMCNC: 32.9 G/DL (ref 31.5–36.5)
MCV RBC AUTO: 98 FL (ref 78–100)
PLATELET # BLD AUTO: 429 10E3/UL (ref 150–450)
POTASSIUM BLD-SCNC: 3.7 MMOL/L (ref 3.4–5.3)
RBC # BLD AUTO: 3.62 10E6/UL (ref 3.8–5.2)
SODIUM SERPL-SCNC: 138 MMOL/L (ref 133–144)
WBC # BLD AUTO: 8.1 10E3/UL (ref 4–11)

## 2021-12-04 PROCEDURE — 120N000011 HC R&B TRANSPLANT UMMC

## 2021-12-04 PROCEDURE — 250N000011 HC RX IP 250 OP 636

## 2021-12-04 PROCEDURE — 258N000003 HC RX IP 258 OP 636: Performed by: STUDENT IN AN ORGANIZED HEALTH CARE EDUCATION/TRAINING PROGRAM

## 2021-12-04 PROCEDURE — 36415 COLL VENOUS BLD VENIPUNCTURE: CPT | Performed by: SURGERY

## 2021-12-04 PROCEDURE — 83735 ASSAY OF MAGNESIUM: CPT | Performed by: SURGERY

## 2021-12-04 PROCEDURE — 85027 COMPLETE CBC AUTOMATED: CPT | Performed by: STUDENT IN AN ORGANIZED HEALTH CARE EDUCATION/TRAINING PROGRAM

## 2021-12-04 PROCEDURE — 999N000127 HC STATISTIC PERIPHERAL IV START W US GUIDANCE

## 2021-12-04 PROCEDURE — 85610 PROTHROMBIN TIME: CPT | Performed by: SURGERY

## 2021-12-04 PROCEDURE — 250N000011 HC RX IP 250 OP 636: Performed by: STUDENT IN AN ORGANIZED HEALTH CARE EDUCATION/TRAINING PROGRAM

## 2021-12-04 PROCEDURE — 250N000009 HC RX 250: Performed by: STUDENT IN AN ORGANIZED HEALTH CARE EDUCATION/TRAINING PROGRAM

## 2021-12-04 PROCEDURE — 99232 SBSQ HOSP IP/OBS MODERATE 35: CPT | Mod: GC | Performed by: SURGERY

## 2021-12-04 PROCEDURE — 999N000147 HC STATISTIC PT IP EVAL DEFER

## 2021-12-04 PROCEDURE — 250N000013 HC RX MED GY IP 250 OP 250 PS 637: Performed by: SURGERY

## 2021-12-04 PROCEDURE — 250N000013 HC RX MED GY IP 250 OP 250 PS 637

## 2021-12-04 PROCEDURE — 250N000013 HC RX MED GY IP 250 OP 250 PS 637: Performed by: STUDENT IN AN ORGANIZED HEALTH CARE EDUCATION/TRAINING PROGRAM

## 2021-12-04 PROCEDURE — 999N000128 HC STATISTIC PERIPHERAL IV START W/O US GUIDANCE

## 2021-12-04 PROCEDURE — 82310 ASSAY OF CALCIUM: CPT | Performed by: STUDENT IN AN ORGANIZED HEALTH CARE EDUCATION/TRAINING PROGRAM

## 2021-12-04 RX ORDER — MAGNESIUM OXIDE 400 MG/1
400 TABLET ORAL 2 TIMES DAILY
Status: COMPLETED | OUTPATIENT
Start: 2021-12-04 | End: 2021-12-05

## 2021-12-04 RX ORDER — BISACODYL 10 MG
10 SUPPOSITORY, RECTAL RECTAL DAILY PRN
Status: DISCONTINUED | OUTPATIENT
Start: 2021-12-04 | End: 2021-12-07 | Stop reason: HOSPADM

## 2021-12-04 RX ORDER — NICOTINE POLACRILEX 4 MG
15-30 LOZENGE BUCCAL
Status: DISCONTINUED | OUTPATIENT
Start: 2021-12-04 | End: 2021-12-07 | Stop reason: HOSPADM

## 2021-12-04 RX ORDER — AMITRIPTYLINE HYDROCHLORIDE 10 MG/1
10 TABLET ORAL AT BEDTIME
Status: DISCONTINUED | OUTPATIENT
Start: 2021-12-04 | End: 2021-12-06

## 2021-12-04 RX ORDER — POTASSIUM CHLORIDE 750 MG/1
10 TABLET, EXTENDED RELEASE ORAL ONCE
Status: COMPLETED | OUTPATIENT
Start: 2021-12-04 | End: 2021-12-04

## 2021-12-04 RX ORDER — DEXTROSE MONOHYDRATE 25 G/50ML
25-50 INJECTION, SOLUTION INTRAVENOUS
Status: DISCONTINUED | OUTPATIENT
Start: 2021-12-04 | End: 2021-12-07 | Stop reason: HOSPADM

## 2021-12-04 RX ADMIN — SODIUM CHLORIDE, POTASSIUM CHLORIDE, SODIUM LACTATE AND CALCIUM CHLORIDE 1000 ML: 600; 310; 30; 20 INJECTION, SOLUTION INTRAVENOUS at 07:32

## 2021-12-04 RX ADMIN — METHOCARBAMOL 750 MG: 750 TABLET ORAL at 21:08

## 2021-12-04 RX ADMIN — PANTOPRAZOLE SODIUM 40 MG: 20 TABLET, DELAYED RELEASE ORAL at 07:36

## 2021-12-04 RX ADMIN — HYDROMORPHONE HYDROCHLORIDE 0.5 MG: 1 INJECTION, SOLUTION INTRAMUSCULAR; INTRAVENOUS; SUBCUTANEOUS at 03:49

## 2021-12-04 RX ADMIN — HYDROMORPHONE HYDROCHLORIDE 0.3 MG: 1 INJECTION, SOLUTION INTRAMUSCULAR; INTRAVENOUS; SUBCUTANEOUS at 06:45

## 2021-12-04 RX ADMIN — ZOLMITRIPTAN 5 MG: 5 TABLET, FILM COATED ORAL at 23:33

## 2021-12-04 RX ADMIN — ACETAMINOPHEN 650 MG: 325 TABLET, FILM COATED ORAL at 01:48

## 2021-12-04 RX ADMIN — LEVOTHYROXINE SODIUM 137 MCG: 0.14 TABLET ORAL at 07:35

## 2021-12-04 RX ADMIN — POTASSIUM CHLORIDE 10 MEQ: 750 TABLET, EXTENDED RELEASE ORAL at 09:07

## 2021-12-04 RX ADMIN — HYDROMORPHONE HYDROCHLORIDE 0.5 MG: 1 INJECTION, SOLUTION INTRAMUSCULAR; INTRAVENOUS; SUBCUTANEOUS at 18:52

## 2021-12-04 RX ADMIN — FAMOTIDINE 20 MG: 20 TABLET ORAL at 21:09

## 2021-12-04 RX ADMIN — ZOLMITRIPTAN 5 MG: 5 TABLET, FILM COATED ORAL at 01:59

## 2021-12-04 RX ADMIN — METHOCARBAMOL 750 MG: 750 TABLET ORAL at 13:47

## 2021-12-04 RX ADMIN — ACETAMINOPHEN 650 MG: 325 TABLET, FILM COATED ORAL at 15:06

## 2021-12-04 RX ADMIN — PANTOPRAZOLE SODIUM 40 MG: 20 TABLET, DELAYED RELEASE ORAL at 21:08

## 2021-12-04 RX ADMIN — PROCHLORPERAZINE EDISYLATE 10 MG: 5 INJECTION INTRAMUSCULAR; INTRAVENOUS at 04:25

## 2021-12-04 RX ADMIN — DEXTROSE 15 G: 15 GEL ORAL at 17:40

## 2021-12-04 RX ADMIN — AMITRIPTYLINE HYDROCHLORIDE 50 MG: 50 TABLET, FILM COATED ORAL at 21:12

## 2021-12-04 RX ADMIN — DIPHENHYDRAMINE HYDROCHLORIDE 25 MG: 50 INJECTION, SOLUTION INTRAMUSCULAR; INTRAVENOUS at 04:25

## 2021-12-04 RX ADMIN — AMITRIPTYLINE HYDROCHLORIDE 10 MG: 10 TABLET, FILM COATED ORAL at 21:14

## 2021-12-04 RX ADMIN — FAMOTIDINE 20 MG: 20 TABLET ORAL at 07:34

## 2021-12-04 RX ADMIN — DOCUSATE SODIUM 50 MG AND SENNOSIDES 8.6 MG 1 TABLET: 8.6; 5 TABLET, FILM COATED ORAL at 07:35

## 2021-12-04 RX ADMIN — BISACODYL 10 MG: 10 SUPPOSITORY RECTAL at 12:18

## 2021-12-04 RX ADMIN — HYDROMORPHONE HYDROCHLORIDE 0.3 MG: 1 INJECTION, SOLUTION INTRAMUSCULAR; INTRAVENOUS; SUBCUTANEOUS at 08:58

## 2021-12-04 RX ADMIN — Medication 400 MG: at 12:18

## 2021-12-04 RX ADMIN — DIPHENHYDRAMINE HYDROCHLORIDE 25 MG: 50 INJECTION, SOLUTION INTRAMUSCULAR; INTRAVENOUS at 19:32

## 2021-12-04 RX ADMIN — SCOPALAMINE 1 PATCH: 1 PATCH, EXTENDED RELEASE TRANSDERMAL at 20:23

## 2021-12-04 RX ADMIN — METHOCARBAMOL 750 MG: 750 TABLET ORAL at 07:34

## 2021-12-04 RX ADMIN — PROCHLORPERAZINE EDISYLATE 10 MG: 5 INJECTION INTRAMUSCULAR; INTRAVENOUS at 19:27

## 2021-12-04 RX ADMIN — ERTAPENEM SODIUM 1 G: 1 INJECTION, POWDER, LYOPHILIZED, FOR SOLUTION INTRAMUSCULAR; INTRAVENOUS at 15:07

## 2021-12-04 RX ADMIN — Medication 400 MG: at 21:09

## 2021-12-04 ASSESSMENT — ACTIVITIES OF DAILY LIVING (ADL)
ADLS_ACUITY_SCORE: 8
ADLS_ACUITY_SCORE: 6
ADLS_ACUITY_SCORE: 8
ADLS_ACUITY_SCORE: 6
ADLS_ACUITY_SCORE: 8
ADLS_ACUITY_SCORE: 6
ADLS_ACUITY_SCORE: 6
ADLS_ACUITY_SCORE: 8

## 2021-12-04 NOTE — PLAN OF CARE
/71 (BP Location: Left arm)   Pulse 90   Temp 97.9  F (36.6  C) (Oral)   Resp 20   Wt 61.4 kg (135 lb 4.8 oz)   SpO2 99%   BMI 21.19 kg/m          1870-6747  VSS on RA. ACHS, 96 and 87. Hypoglycemic episode, see note.  at bedside for part of shift. Mag 1.9. PO replacement given. Recheck after 4 doses. Potassium 3.7, no replacement needed. Recheck in am. J tube site pain, dilaudid x1, Tylenol and scheduled robaxin. NPO with ice chips. PIV w/LR @100ml/hr.  G and J tube to gravity. Dsgs changed. 1 large BM today, suppository given. No nausea medications given this shift. Plan for CT guided drainage of abscess tomorrow. Will continue to monitor and notify team with changes.

## 2021-12-04 NOTE — ED NOTES
Patient arrived in signout from Dr. Holman  See his note for additional documentation.  In short, patient presents with abdominal pain after jejunostomy tube placement.  CT scan shows associated abscess.  Plan on signout was follow-up with surgery recommendations and dispo accordingly.    Patient eval by surgery team.  They will admit to their service.  They recommend placing the jejunostomy tube to gravity bag.  No emergent procedure, but made n.p.oVon Elizabeth,   12/03/21 0632

## 2021-12-04 NOTE — PLAN OF CARE
7A OT: Cancel/Defer     Per PT report, pt IND in room w/no acute ADL needs.  is available to assist as needed and has reacher to assist as pt reports difficulty w/bending. Will defer and complete OT orders at this time.

## 2021-12-04 NOTE — PROGRESS NOTES
Emergency General Surgery Progress Note  Surgery Cross-Cover    12/04/2021  11:48PM    Dinora cMghee is a 56 year old female with h/o TPAIT w/choledochoduodenostomy, duodenojejunostomy, Vera-Y reconstruction, gastroparesis and hypoglycemia, managed by a G-J tube, additional Jtube placed into mid jejunum downstream of the jejunojejunostomy (11/28) admitted for abd pain at the new J-tube site and CT-ABD pelvis with contained leak/abscess just deep to the abdominal wall.    Pt reports intermittent nausea and unchanged abdominal pain. Denies SOB, chest pain, or dizziness. Voiding adequately    /68 (BP Location: Left arm)   Pulse 90   Temp 97.9  F (36.6  C) (Oral)   Resp 20   Wt 61.4 kg (135 lb 4.8 oz)   SpO2 95%   BMI 21.19 kg/m      Gen: A&O x3, NAD  Chest: breathing non-labored on room air  Abdomen: soft, mildly distended; mildly tender to moderate palpation in the midline, and moderately tender in the L lower quadrant, more so andrez-PEJ tube. No peritonitis. PEG and PEJ tubes to gravity  Extremities: warm and well perfused    A/P: No acute issues at this time. Abdominal exam is unchanged from previous. Continue plan of care per primary team. Please call with questions or concerns.    Rickey Brock MD  General Surgery PGY 1 Resident  Pager: [phone]

## 2021-12-04 NOTE — PROGRESS NOTES
General Surgery Progress Note  12/04/2021   ------------------------------------------------------------------------------------------------  Subjective:  Patient reports nausea and abdominal pain. No vomiting. Does feel improved from last night, especially after the pink lady enema. No subjective fevers/chills  ------------------------------------------------------------------------------------------------  Objective:  Temp:  [97.4  F (36.3  C)-97.9  F (36.6  C)] 97.4  F (36.3  C)  Pulse:  [] 91  Resp:  [20] 20  BP: (113-132)/(68-84) 118/75  SpO2:  [95 %-99 %] 98 %    I/O last 3 completed shifts:  In: -   Out: 800 [Urine:800]      Gen: Awake, interactive, NAD  Resp: breathing comfortably on room air  CV: regular rate, appears well perfused  Abd: soft, mildly distended, mildly tender to palpation over midline and in LLQ. No signs of peritonitis. PEG and PEJ tubes to gravity     Labs:  Recent Labs   Lab 12/04/21  0659 12/03/21  1227 11/28/21  0620   WBC 8.1 9.6 4.4   HGB 11.6* 13.1 12.3    439 375       Recent Labs   Lab 12/04/21  0818 12/04/21  0659 12/04/21  0033 12/03/21 2025 12/03/21 1229 12/03/21  1227 11/28/21  0817 11/28/21  0620   NA  --  138  --   --   --  138  --  141   POTASSIUM  --  3.7  --  4.1  --  4.2  --  3.8   CHLORIDE  --  103  --   --   --  104  --  109   CO2  --  29  --   --   --  29  --  29   BUN  --  8  --   --   --  11  --  11   CR  --  0.43*  --   --  0.5 0.55  --  0.67   GLC 96 111* 120*  --   --  115*   < > 104*   KASSI  --  9.1  --   --   --  9.2  --  8.4*   MAG  --   --   --   --   --  2.1  --  2.1   PHOS  --   --   --   --   --   --   --  3.3    < > = values in this interval not displayed.       Imaging:  Results for orders placed or performed during the hospital encounter of 12/03/21   CT Abdomen Pelvis w Contrast    Impression    IMPRESSION: In summary: new intraluminal j-tube mushroom retention  device, probable contained leak around mushroom tracking in two  directions to  intraperitoneal 3 cm abscess and along the percutaneous  tract.     In detail:   1. New placement of left lower quadrant percutaneous jejunostomy with  intraluminal enteric contrast confirming intraluminal placement of the  tube.  No extraluminal contrast to suggest perforation.  No  pneumoperitoneum or free fluid.  2. Left mid intraperitoneal contained leak/abscess just deep to the  abdominal wall with fluid tract connecting to superolateral aspect of  the retention mushroom. It is possible fluid is seeping out of the  small bowel around the top margin of the mushroom both intraperitoneal  to the collection and along the tract to the skin.  3. Postsurgical changes compatible with provided history including  distal pancreatectomy, cholecystectomy, splenectomy, with  choledochoduodenostomy and prior vera en y gastric bypass.  4. Percutaneous gastrostomy tube is in place.    Impressions #1 and 2 discussed with ED provider Dr. Holman at 3:25 PM  and with Dr Frank of GI at 3:40 PM by Dr Ortiz on 12/3/2021.   Providers verbalized understanding.    I, RK TINSLEY MD, attest that I was present for all critical  portions of the procedure and was immediately available to provide  guidance and assistance during the remainder of the procedure.    I have personally reviewed the examination and initial interpretation  and I agree with the findings.    RK TINSLEY MD         SYSTEM ID:  [id number]          =======================================================  Assessment/Plan:   56 year old female with h/o TPAIT w/choledochoduodenostomy, duodenojejunostomy, Vera-Y reconstruction, gastroparesis and hypoglycemia, managed by a G-J tube, additional Jtube placed into mid jejunum downstream of the jejunojejunostomy (11/28) admitted for abd pain at the new J-tube site and CT-ABD pelvis with contained leak/abscess just deep to the abdominal wall.    - NPO, IVF  - Continue Ertapenem q24  - Repeat pink lady enema  - IR  consult for abscess drainage and repeat drain study  - Will reach out to GI to confirm exact position of J tube     Seen, examined, and discussed with chief resident Dr. Najera, and discussed discuss with staff Dr. Sin.    Tania Jamison MD PGY-1  she/her  [facility], [facility]

## 2021-12-04 NOTE — PROGRESS NOTES
-Pt feeling better, though still with focal tenderness, after tightening of J tube which had become loosened  -Would further benefit from IR aspiration of collection  -Had BM, on ABX  -May consider tube study as well to confirm lack of extravasation    -Surgery would be last resort as laparoscopic insufflation culd potentially hinder fistula development, and laparotomy would be through previous mesh. Mroeover, vitals and labs are WNL (see Dr Sin's attestation)    Dariel Anderson MD

## 2021-12-04 NOTE — CONSULTS
INTERVENTIONAL RADIOLOGY CONSULT NOTE    Name: Dinora Mcghee  Age: 56 year old     56-year-old female with a complex surgical history including a history of TPAIT w/choledochoduodenostomy, duodenojejunostomy, Vera-Y reconstruction, managed by GJ tube and a direct jejunostomy tube downstream from the jejunojejunostomy recently placed on 11/28/2021.  She presented to the emergency department and admitted for left lower quadrant pain near the new tracheostomy site and a CT scan was obtained demonstrating a small collection deep to the abdominal wall adjacent to the J-tube insertion.  Patient is afebrile and hemodynamically stable, no leukocytosis (white count 9.6 on admission, 8.1 this morning).    /70 (BP Location: Left arm)   Pulse 99   Temp 97.6  F (36.4  C) (Oral)   Resp 20   Wt 61.4 kg (135 lb 4.8 oz)   SpO2 98%   BMI 21.19 kg/m      Personally reviewed the CT from 12/3/2021 and noted the small collection anterosuperior to the left lower quadrant jejunostomy insertion (as seen on series 2, image 36).  After administration of enteric contrast, there is no evidence of communication with the collection, no evidence of perforation.    Assessment/plan: Complex surgical history with small intra-abdominal collection noted adjacent to the recently placed jejunostomy without evidence of perforation on CT.  Patient hemodynamically stable, afebrile without evidence of sepsis or peritonitis.  Collection appears to be amenable to CT-guided approach for aspiration, however it might be too small to place a drain.  No need for emergent intervention, plan to aspirate or place a drain during normal weekday hours however could be able to perform the intervention tomorrow if necessary.  -Patient can be made n.p.o. after midnight tonight for potential intervention tomorrow.  We will touch base tomorrow if we are unable to perform the procedure this weekend.  -Anticipate CT-guided aspiration, the collection is not large  enough to accommodate a drain.  - Drain study is typically done by general radiology in fluoroscopy without sedation, please consult radiology to further evaluate leak from the J tube.    Thank you for this consultation,      Peewee Felipe DO, MS                PGY-5 Interventional Radiology   [facility]   11:42 AM December 4, 2021

## 2021-12-04 NOTE — PLAN OF CARE
/84 (BP Location: Left arm)   Pulse 87   Temp 97.6  F (36.4  C) (Oral)   Resp 20   Wt 61.4 kg (135 lb 4.8 oz)   SpO2 98%   BMI 21.19 kg/m      [id number]    VSS on RA. ACHS blood sugars, 120 at bedtime. Patient complained of abdominal pain and nausea, treated with compazine/benadryl, dilaudid, tylenol, zomig (see MAR). Patient stating that her skin by her G tube feels like fiberglass. Patient currently NPO. PIV running LR at 100 mL/hr. Last BM 12/3/21 after pink lady enema. Voiding adequately. J tube to gravity; G tube to gravity and leaky at site with drain sponges. UAL in room. Continue with plan of care and update team with any changes.

## 2021-12-04 NOTE — PROVIDER NOTIFICATION
Notified team of blood sugar of 65.Glucose gel (15g) given. Recheck 63 and 89. Pt refused any additional interventions.

## 2021-12-04 NOTE — PROGRESS NOTES
Emergency General Surgery Progress Note  Surgery Cross-Cover    12/03/2021  11:48PM    Dinora Mcghee is a 56 year old female with h/o TPAIT w/choledochoduodenostomy, duodenojejunostomy, Vera-Y reconstruction, gastroparesis and hypoglycemia, managed by a G-J tube, additional Jtube placed into mid jejunum downstream of the jejunojejunostomy (11/28) admitted for abd pain at the new J-tube site and CT-ABD pelvis with contained leak/abscess just deep to the abdominal wall.    Pt reports more tolerable nausea and unchanged abdominal pain. Had some pain and discomfort relieve after a larger bowel movement in the ED post pink lady enema . Denies SOB, chest pain, or dizziness. Voiding adequately    /84 (BP Location: Left arm)   Pulse 87   Temp 97.6  F (36.4  C) (Oral)   Resp 20   SpO2 98%     Gen: A&O x3, NAD  Chest: breathing non-labored on room air  Abdomen: soft, mildly distended; mildly tender to moderate palpation in the midline, and moderately tender in the L lower quadrant, more so andrez-PEJ tube. No peritonitis. PEG and PEJ tubes to gravity  Extremities: warm and well perfused    A/P: No acute issues at this time. Abdomen is less distended than on previous exam. Tenderness is unchanged. Will assess again in 4 hours.     Rickey Brock MD  General Surgery PGY 1 Resident  Pager: [phone]

## 2021-12-04 NOTE — PLAN OF CARE
PT: DEFER. Per chart review, conversation w/ pt; noted to be up IND, reports several walks in hallway and no balance concerns; typically walks ~1 mile per day at home. Pt reports greatest concern right now being pain with mobility at J tube site- pain with flexion at waist; reaching to floor, etc. Briefly discussed adaptive equipment such as reacher to assist with these activities if pain persists; pt familiar with devices and has at home. Pt reports  able to assist at home as needed as well. No inpatient PT needs identified; will complete orders.

## 2021-12-04 NOTE — PROGRESS NOTES
GASTROENTEROLOGY PROGRESS NOTE    Date: 12/04/2021     ASSESSMENT  Dinora Mcghee is a 56 year old female with PMHx of TPAIT w/choledochoduodenostomy, duodenojejunostomy, Vera-Y reconstruction (12/2016). PMHx includes suspected gastroparesis and hypoglycemia, managed by a G-J tube (last exchanged 11/28, RUQ), additional Jtube placed into mid jejunum downstream of the jejunojejunostomy (also on 11/28, LLQ) who presented with abd pain at the new Jtube site with leaking TF formula     RECOMMENDATIONS  - We appreciate recommendations from general surgery team and IR  - The patient to undergo aspiration of the fluid collection around the J-tube tomorrow, tube study to be performed today.   - GI does not recommend for manipulation of J tube given the risk of worsening leakage from the J-tube.     Gastroenterology follow up recommendations: Pending clinical course.      Thank you for involving us in this patient's care. Please do not hesitate to contact the GI service with any questions or concerns.      Pt care plan discussed with TRUNG Choudhury, GI staff physician.    Som Hernandez MD  Gastroenterology Fellow  Division of Gastroenterology, Hepatology and Nutrition  [facility]    _______________________________________________________________    24 Hour Events:  No fevers, no leukocytosis. Stable VS and abdominal exam.     Subjective:  Ongoing pain on the left side around the direct J-tube. There is ongoing drainage as well from the J-tube site.     Objective:  Blood pressure 110/70, pulse 99, temperature 97.6  F (36.4  C), temperature source Oral, resp. rate 20, weight 61.4 kg (135 lb 4.8 oz), SpO2 98 %, not currently breastfeeding.    Gen: A&Ox3, NAD  HEENT: ncat, perrl, eomi, sclera anicteric  CV: RRR  Lungs: CTA b/l  Abd: exquisite tenderness around the J-tube, +bs, soft, nd/nt  Skin: no jaundice, no stigmata of chronic liver disease  MS: appropriate muscle mass for age  Neuro: non focal        LABS:  BMP  Recent Labs   Lab 12/04/21  1133 12/04/21  0818 12/04/21  0659 12/04/21  0033 12/03/21 2025 12/03/21  1229 12/03/21  1227 11/28/21  0817 11/28/21  0620 11/28/21  0455 11/27/21  1848   NA  --   --  138  --   --   --  138  --  141  --  139   POTASSIUM  --   --  3.7  --  4.1  --  4.2  --  3.8  --  3.9   CHLORIDE  --   --  103  --   --   --  104  --  109  --  106   KASSI  --   --  9.1  --   --   --  9.2  --  8.4*  --  9.1   CO2  --   --  29  --   --   --  29  --  29  --  29   BUN  --   --  8  --   --   --  11  --  11  --  12   CR  --   --  0.43*  --   --  0.5 0.55  --  0.67  --  0.68   GLC 87 96 111* 120*  --   --  115*   < > 104*   < > 88    < > = values in this interval not displayed.     CBC  Recent Labs   Lab 12/04/21  0659 12/03/21  1227 11/28/21 0620 11/27/21  1848   WBC 8.1 9.6 4.4 5.5   RBC 3.62* 4.12 3.60* 4.25   HGB 11.6* 13.1 12.3 13.6   HCT 35.3 39.4 35.3 41.3   MCV 98 96 98 97   MCH 32.0 31.8 34.2* 32.0   MCHC 32.9 33.2 34.8 32.9   RDW 13.3 13.5 13.8 13.7    439 375 398     INR  Recent Labs   Lab 12/04/21  0659 12/03/21  1230 11/28/21  0620 11/27/21  1848   INR 1.05 0.97 1.14 1.04     LFTs  Recent Labs   Lab 12/03/21  1227 11/28/21  0620 11/27/21  1848   ALKPHOS 104 68 92   AST 11 26 21   ALT 19 28 32   BILITOTAL 0.5 1.0 0.6   PROTTOTAL 7.1 5.8* 7.3   ALBUMIN 3.0* 2.7* 3.7      PANC  Recent Labs   Lab 12/03/21  1227   LIPASE <10*       IMAGING:  CT abdomen with oral contrast 12/3/21  In detail:   1. New placement of left lower quadrant percutaneous jejunostomy with  intraluminal enteric contrast confirming intraluminal placement of the  tube.  No extraluminal contrast to suggest perforation.  No  pneumoperitoneum or free fluid.  2. Left mid intraperitoneal contained leak/abscess just deep to the  abdominal wall with fluid tract connecting to superolateral aspect of  the retention mushroom. It is possible fluid is seeping out of the  small bowel around the top margin of the mushroom  both intraperitoneal  to the collection and along the tract to the skin.  3. Postsurgical changes compatible with provided history including  distal pancreatectomy, cholecystectomy, splenectomy, with  choledochoduodenostomy and prior jamel en y gastric bypass.  4. Percutaneous gastrostomy tube is in place.

## 2021-12-04 NOTE — PATIENT INSTRUCTIONS
- continue with TFs as you are  - continue with oral diet for pleasure as tolerated  - continue scopolamine,prn compazine, prn promethazine, prn diphenhydramine for breakthrough nausea  - noted use of flexeril and CBD oil  - continue current bowel regimen  - follow-up with pancreas clinic as planned  - follow-up in GI clinic with Dr. Genao     If you have any questions, please don't hesitate to contact me through our GI RN Clinic Coordinator, Melyssa Almonte, at [phone].

## 2021-12-04 NOTE — PROGRESS NOTES
Admitted/transferred from: ED  Time of arrival on unit 2100  2 RN full  skin assessment completed by Maricarmen GALO RN, & Zaira JOSEPH RN  Skin assessment finding: issues found :     -J tube (R side)  -G tube that is leaking (L side)  -Redness noted on abdomen due to tape. Per pt she is sensitive to adhesives  -Dry, flaky skin noted on feet  -Scopolamine patch behind L ear    Interventions/actions: Tape/other adhesives avoided. Gauze applied to G tube for leakiness. G tube also put to gravity per order.      Will continue to monitor.

## 2021-12-05 ENCOUNTER — APPOINTMENT (OUTPATIENT)
Dept: GENERAL RADIOLOGY | Facility: CLINIC | Age: 56
End: 2021-12-05
Payer: COMMERCIAL

## 2021-12-05 ENCOUNTER — APPOINTMENT (OUTPATIENT)
Dept: INTERVENTIONAL RADIOLOGY/VASCULAR | Facility: CLINIC | Age: 56
End: 2021-12-05
Attending: RADIOLOGY
Payer: COMMERCIAL

## 2021-12-05 LAB
GLUCOSE BLDC GLUCOMTR-MCNC: 115 MG/DL (ref 70–99)
GLUCOSE BLDC GLUCOMTR-MCNC: 152 MG/DL (ref 70–99)
GLUCOSE BLDC GLUCOMTR-MCNC: 68 MG/DL (ref 70–99)
GLUCOSE BLDC GLUCOMTR-MCNC: 75 MG/DL (ref 70–99)
GLUCOSE BLDC GLUCOMTR-MCNC: 77 MG/DL (ref 70–99)
GLUCOSE BLDC GLUCOMTR-MCNC: 80 MG/DL (ref 70–99)
GLUCOSE BLDC GLUCOMTR-MCNC: 84 MG/DL (ref 70–99)
GLUCOSE BLDC GLUCOMTR-MCNC: 84 MG/DL (ref 70–99)
GRAM STAIN RESULT: ABNORMAL
GRAM STAIN RESULT: ABNORMAL
HOLD SPECIMEN: NORMAL
MAGNESIUM SERPL-MCNC: 1.8 MG/DL (ref 1.6–2.3)
POTASSIUM BLD-SCNC: 3.7 MMOL/L (ref 3.4–5.3)

## 2021-12-05 PROCEDURE — 250N000011 HC RX IP 250 OP 636

## 2021-12-05 PROCEDURE — 250N000013 HC RX MED GY IP 250 OP 250 PS 637: Performed by: STUDENT IN AN ORGANIZED HEALTH CARE EDUCATION/TRAINING PROGRAM

## 2021-12-05 PROCEDURE — 0W9H3ZX DRAINAGE OF RETROPERITONEUM, PERCUTANEOUS APPROACH, DIAGNOSTIC: ICD-10-PCS | Performed by: RADIOLOGY

## 2021-12-05 PROCEDURE — 250N000013 HC RX MED GY IP 250 OP 250 PS 637

## 2021-12-05 PROCEDURE — 87070 CULTURE OTHR SPECIMN AEROBIC: CPT | Performed by: RADIOLOGY

## 2021-12-05 PROCEDURE — 250N000009 HC RX 250: Performed by: RADIOLOGY

## 2021-12-05 PROCEDURE — 258N000001 HC RX 258

## 2021-12-05 PROCEDURE — 87205 SMEAR GRAM STAIN: CPT | Performed by: RADIOLOGY

## 2021-12-05 PROCEDURE — C1769 GUIDE WIRE: HCPCS

## 2021-12-05 PROCEDURE — 250N000011 HC RX IP 250 OP 636: Performed by: STUDENT IN AN ORGANIZED HEALTH CARE EDUCATION/TRAINING PROGRAM

## 2021-12-05 PROCEDURE — 250N000013 HC RX MED GY IP 250 OP 250 PS 637: Performed by: SURGERY

## 2021-12-05 PROCEDURE — 77012 CT SCAN FOR NEEDLE BIOPSY: CPT

## 2021-12-05 PROCEDURE — 99232 SBSQ HOSP IP/OBS MODERATE 35: CPT | Mod: GC | Performed by: SURGERY

## 2021-12-05 PROCEDURE — 77012 CT SCAN FOR NEEDLE BIOPSY: CPT | Mod: 26 | Performed by: RADIOLOGY

## 2021-12-05 PROCEDURE — 99152 MOD SED SAME PHYS/QHP 5/>YRS: CPT | Mod: GC | Performed by: RADIOLOGY

## 2021-12-05 PROCEDURE — 10160 PNXR ASPIR ABSC HMTMA BULLA: CPT | Mod: GC | Performed by: RADIOLOGY

## 2021-12-05 PROCEDURE — 272N000506 HC NEEDLE CR6

## 2021-12-05 PROCEDURE — 74018 RADEX ABDOMEN 1 VIEW: CPT | Mod: 26 | Performed by: RADIOLOGY

## 2021-12-05 PROCEDURE — 3C1ZX8Z IRRIGATION OF INDWELLING DEVICE USING IRRIGATING SUBSTANCE, EXTERNAL APPROACH: ICD-10-PCS | Performed by: RADIOLOGY

## 2021-12-05 PROCEDURE — 49465 FLUORO EXAM OF G/COLON TUBE: CPT

## 2021-12-05 PROCEDURE — 83735 ASSAY OF MAGNESIUM: CPT | Performed by: SURGERY

## 2021-12-05 PROCEDURE — 36415 COLL VENOUS BLD VENIPUNCTURE: CPT | Performed by: SURGERY

## 2021-12-05 PROCEDURE — 120N000011 HC R&B TRANSPLANT UMMC

## 2021-12-05 PROCEDURE — 272N000504 HC NEEDLE CR4

## 2021-12-05 PROCEDURE — 272N000500 HC NEEDLE CR2

## 2021-12-05 PROCEDURE — 84132 ASSAY OF SERUM POTASSIUM: CPT | Performed by: SURGERY

## 2021-12-05 PROCEDURE — 250N000011 HC RX IP 250 OP 636: Performed by: RADIOLOGY

## 2021-12-05 PROCEDURE — 258N000003 HC RX IP 258 OP 636: Performed by: STUDENT IN AN ORGANIZED HEALTH CARE EDUCATION/TRAINING PROGRAM

## 2021-12-05 PROCEDURE — 999N000128 HC STATISTIC PERIPHERAL IV START W/O US GUIDANCE

## 2021-12-05 RX ORDER — NALOXONE HYDROCHLORIDE 0.4 MG/ML
0.4 INJECTION, SOLUTION INTRAMUSCULAR; INTRAVENOUS; SUBCUTANEOUS
Status: DISCONTINUED | OUTPATIENT
Start: 2021-12-05 | End: 2021-12-07 | Stop reason: HOSPADM

## 2021-12-05 RX ORDER — SODIUM CHLORIDE, SODIUM LACTATE, POTASSIUM CHLORIDE, CALCIUM CHLORIDE 600; 310; 30; 20 MG/100ML; MG/100ML; MG/100ML; MG/100ML
INJECTION, SOLUTION INTRAVENOUS CONTINUOUS
Status: DISCONTINUED | OUTPATIENT
Start: 2021-12-05 | End: 2021-12-05

## 2021-12-05 RX ORDER — NALOXONE HYDROCHLORIDE 0.4 MG/ML
0.2 INJECTION, SOLUTION INTRAMUSCULAR; INTRAVENOUS; SUBCUTANEOUS
Status: DISCONTINUED | OUTPATIENT
Start: 2021-12-05 | End: 2021-12-07 | Stop reason: HOSPADM

## 2021-12-05 RX ORDER — FENTANYL CITRATE 50 UG/ML
25-50 INJECTION, SOLUTION INTRAMUSCULAR; INTRAVENOUS EVERY 5 MIN PRN
Status: DISCONTINUED | OUTPATIENT
Start: 2021-12-05 | End: 2021-12-06

## 2021-12-05 RX ORDER — DEXTROSE, SODIUM CHLORIDE, SODIUM LACTATE, POTASSIUM CHLORIDE, AND CALCIUM CHLORIDE 5; .6; .31; .03; .02 G/100ML; G/100ML; G/100ML; G/100ML; G/100ML
INJECTION, SOLUTION INTRAVENOUS CONTINUOUS
Status: ACTIVE | OUTPATIENT
Start: 2021-12-05 | End: 2021-12-05

## 2021-12-05 RX ORDER — POTASSIUM CHLORIDE 750 MG/1
10 TABLET, EXTENDED RELEASE ORAL ONCE
Status: COMPLETED | OUTPATIENT
Start: 2021-12-05 | End: 2021-12-05

## 2021-12-05 RX ORDER — DEXTROSE, SODIUM CHLORIDE, SODIUM LACTATE, POTASSIUM CHLORIDE, AND CALCIUM CHLORIDE 5; .6; .31; .03; .02 G/100ML; G/100ML; G/100ML; G/100ML; G/100ML
INJECTION, SOLUTION INTRAVENOUS CONTINUOUS
Status: DISCONTINUED | OUTPATIENT
Start: 2021-12-05 | End: 2021-12-07 | Stop reason: HOSPADM

## 2021-12-05 RX ORDER — FLUMAZENIL 0.1 MG/ML
0.2 INJECTION, SOLUTION INTRAVENOUS
Status: DISCONTINUED | OUTPATIENT
Start: 2021-12-05 | End: 2021-12-06

## 2021-12-05 RX ADMIN — SODIUM CHLORIDE, SODIUM LACTATE, POTASSIUM CHLORIDE, CALCIUM CHLORIDE AND DEXTROSE MONOHYDRATE: 5; 600; 310; 30; 20 INJECTION, SOLUTION INTRAVENOUS at 00:15

## 2021-12-05 RX ADMIN — POTASSIUM CHLORIDE 10 MEQ: 750 TABLET, EXTENDED RELEASE ORAL at 11:51

## 2021-12-05 RX ADMIN — ACETAMINOPHEN 650 MG: 325 TABLET, FILM COATED ORAL at 09:59

## 2021-12-05 RX ADMIN — SODIUM CHLORIDE, POTASSIUM CHLORIDE, SODIUM LACTATE AND CALCIUM CHLORIDE: 600; 310; 30; 20 INJECTION, SOLUTION INTRAVENOUS at 05:41

## 2021-12-05 RX ADMIN — DEXTROSE MONOHYDRATE 25 ML: 500 INJECTION PARENTERAL at 21:51

## 2021-12-05 RX ADMIN — DOCUSATE SODIUM 50 MG AND SENNOSIDES 8.6 MG 1 TABLET: 8.6; 5 TABLET, FILM COATED ORAL at 08:18

## 2021-12-05 RX ADMIN — METHOCARBAMOL 750 MG: 750 TABLET ORAL at 15:33

## 2021-12-05 RX ADMIN — METHOCARBAMOL 750 MG: 750 TABLET ORAL at 08:18

## 2021-12-05 RX ADMIN — METHOCARBAMOL 750 MG: 750 TABLET ORAL at 21:34

## 2021-12-05 RX ADMIN — Medication 400 MG: at 21:34

## 2021-12-05 RX ADMIN — DIPHENHYDRAMINE HYDROCHLORIDE 25 MG: 25 CAPSULE ORAL at 11:50

## 2021-12-05 RX ADMIN — PROCHLORPERAZINE EDISYLATE 10 MG: 5 INJECTION INTRAMUSCULAR; INTRAVENOUS at 02:02

## 2021-12-05 RX ADMIN — SODIUM CHLORIDE, SODIUM LACTATE, POTASSIUM CHLORIDE, CALCIUM CHLORIDE AND DEXTROSE MONOHYDRATE: 5; 600; 310; 30; 20 INJECTION, SOLUTION INTRAVENOUS at 23:00

## 2021-12-05 RX ADMIN — FENTANYL CITRATE 50 MCG: 50 INJECTION INTRAMUSCULAR; INTRAVENOUS at 14:37

## 2021-12-05 RX ADMIN — ERTAPENEM SODIUM 1 G: 1 INJECTION, POWDER, LYOPHILIZED, FOR SOLUTION INTRAMUSCULAR; INTRAVENOUS at 15:33

## 2021-12-05 RX ADMIN — ZOLMITRIPTAN 5 MG: 5 TABLET, FILM COATED ORAL at 23:07

## 2021-12-05 RX ADMIN — LIDOCAINE HYDROCHLORIDE 6 ML: 10 INJECTION, SOLUTION EPIDURAL; INFILTRATION; INTRACAUDAL; PERINEURAL at 14:44

## 2021-12-05 RX ADMIN — PANTOPRAZOLE SODIUM 40 MG: 20 TABLET, DELAYED RELEASE ORAL at 08:21

## 2021-12-05 RX ADMIN — SODIUM CHLORIDE, POTASSIUM CHLORIDE, SODIUM LACTATE AND CALCIUM CHLORIDE: 600; 310; 30; 20 INJECTION, SOLUTION INTRAVENOUS at 15:33

## 2021-12-05 RX ADMIN — PANTOPRAZOLE SODIUM 40 MG: 20 TABLET, DELAYED RELEASE ORAL at 21:33

## 2021-12-05 RX ADMIN — DEXTROSE 15 G: 15 GEL ORAL at 09:56

## 2021-12-05 RX ADMIN — MIDAZOLAM 1 MG: 1 INJECTION INTRAMUSCULAR; INTRAVENOUS at 14:43

## 2021-12-05 RX ADMIN — PROCHLORPERAZINE EDISYLATE 10 MG: 5 INJECTION INTRAMUSCULAR; INTRAVENOUS at 18:40

## 2021-12-05 RX ADMIN — FAMOTIDINE 20 MG: 20 TABLET ORAL at 21:34

## 2021-12-05 RX ADMIN — FAMOTIDINE 20 MG: 20 TABLET ORAL at 08:21

## 2021-12-05 RX ADMIN — FENTANYL CITRATE 50 MCG: 50 INJECTION INTRAMUSCULAR; INTRAVENOUS at 14:45

## 2021-12-05 RX ADMIN — DIPHENHYDRAMINE HYDROCHLORIDE 25 MG: 50 INJECTION, SOLUTION INTRAMUSCULAR; INTRAVENOUS at 02:00

## 2021-12-05 RX ADMIN — DIPHENHYDRAMINE HYDROCHLORIDE 25 MG: 50 INJECTION, SOLUTION INTRAMUSCULAR; INTRAVENOUS at 18:40

## 2021-12-05 RX ADMIN — PROCHLORPERAZINE EDISYLATE 10 MG: 5 INJECTION INTRAMUSCULAR; INTRAVENOUS at 11:51

## 2021-12-05 RX ADMIN — MIDAZOLAM 1 MG: 1 INJECTION INTRAMUSCULAR; INTRAVENOUS at 14:36

## 2021-12-05 RX ADMIN — ACETAMINOPHEN 650 MG: 325 TABLET, FILM COATED ORAL at 18:40

## 2021-12-05 RX ADMIN — LEVOTHYROXINE SODIUM 137 MCG: 0.14 TABLET ORAL at 08:21

## 2021-12-05 RX ADMIN — AMITRIPTYLINE HYDROCHLORIDE 10 MG: 10 TABLET, FILM COATED ORAL at 21:33

## 2021-12-05 RX ADMIN — DIATRIZOATE MEGLUMINE AND DIATRIZOATE SODIUM 120 ML: 660; 100 SOLUTION ORAL; RECTAL at 01:46

## 2021-12-05 RX ADMIN — AMITRIPTYLINE HYDROCHLORIDE 50 MG: 50 TABLET, FILM COATED ORAL at 21:33

## 2021-12-05 RX ADMIN — Medication 400 MG: at 08:21

## 2021-12-05 ASSESSMENT — ACTIVITIES OF DAILY LIVING (ADL)
ADLS_ACUITY_SCORE: 8

## 2021-12-05 NOTE — PROGRESS NOTES
[id number]    /79 (BP Location: Right arm)   Pulse 79   Temp 98.3  F (36.8  C) (Oral)   Resp 18   Wt 61.2 kg (134 lb 14.4 oz)   SpO2 96%   BMI 21.13 kg/m        VS: VSS, RA, Afebrile.   BG: LR changed to D5LR overnight at same rate for lower BG, 0200 check was 83.   Labs: No new labs this shift.   Pain/Nausea/PRN: Scopolamine patch applied to right ear, left patch removed. PRN IV Benadryl/compozine given x2 for nausea, reported pain from abdominal site and back but refused intervention. Pt complained of migraine, PO PRN Zomig given x1. Pt appeared to sleep restfully afterward.   Diet: NPO  LDA: Lower J-tube dressing changed x2, 400ml greenish, thick output from gravity bag. G-tube minimal output, light clear green/serous in color.    GI: no bm this shift.    : Voiding not saving, good amounts.  Skin: no new skin changes noted this shift.   Mobility: SBA to bathroom.   Plan: Gastrographin completed overnight. Plan for Ct with abscess drainage today. Will continue to monitor and update provider with changes in condition.

## 2021-12-05 NOTE — PROGRESS NOTES
General Surgery Progress Note  12/05/2021   ------------------------------------------------------------------------------------------------  Subjective:  Patient reports having a BM yesterday which improved her pain and nausea. She does still report pins/needles/knife like pain at tube site. No subjective fever or chills. Adequate UO.   ------------------------------------------------------------------------------------------------  Objective:  Temp:  [97.4  F (36.3  C)-98.6  F (37  C)] 98.3  F (36.8  C)  Pulse:  [79-99] 79  Resp:  [18-20] 18  BP: (108-122)/(69-79) 122/79  SpO2:  [95 %-99 %] 96 %    I/O last 3 completed shifts:  In: -   Out: 150 [Emesis/NG output:150]      Gen: Awake, interactive, NAD  Resp: breathing comfortably on room air  CV: regular rate, appears well perfused  Abd: soft, nondistended, mildly tender to palpation around around J tube. No signs of peritonitis. PEG and PEJ tubes to gravity     Labs:  Recent Labs   Lab 12/04/21  0659 12/03/21  1227   WBC 8.1 9.6   HGB 11.6* 13.1    439       Recent Labs   Lab 12/04/21  2157 12/04/21  1843 12/04/21  1817 12/04/21  1133 12/04/21  0934 12/04/21  0818 12/04/21  0659 12/04/21  0033 12/03/21 2025 12/03/21 1229 12/03/21 1227   NA  --   --   --   --   --   --  138  --   --   --  138   POTASSIUM  --   --   --   --   --   --  3.7  --  4.1  --  4.2   CHLORIDE  --   --   --   --   --   --  103  --   --   --  104   CO2  --   --   --   --   --   --  29  --   --   --  29   BUN  --   --   --   --   --   --  8  --   --   --  11   CR  --   --   --   --   --   --  0.43*  --   --  0.5 0.55   GLC 83 89 89   < >  --    < > 111*   < >  --   --  115*   KASSI  --   --   --   --   --   --  9.1  --   --   --  9.2   MAG  --   --   --   --  1.9  --   --   --   --   --  2.1    < > = values in this interval not displayed.       Imaging  XR Gastrostomy Tube Check    Impression    RESIDENT PRELIMINARY INTERPRETATION  IMPRESSION:   Percutaneous jejunostomy tube positioned  within the jejunum. No  evidence for contrast leakage or jejunal communication with the known  left upper quadrant abscess.        =======================================================  Assessment/Plan:   56 year old female with h/o TPAIT w/choledochoduodenostomy, duodenojejunostomy, Vera-Y reconstruction, gastroparesis and hypoglycemia, managed by a G-J tube, additional Jtube placed into mid jejunum downstream of the jejunojejunostomy (11/28) admitted for abd pain at the new J-tube site and CT-ABD pelvis with contained leak/abscess just deep to the abdominal wall.    - Reach out to IR for timing of procedure  - Reach out to GI regarding TF if IR pushes procedure to tomorrow   - NPO, IVF  - Continue Ertapenem q24     Seen, examined, and discussed with chief resident Dr. Najera and with staff Dr. Sin.    Tania Jamison MD PGY-1  she/her  [facility]

## 2021-12-05 NOTE — PLAN OF CARE
/77 (BP Location: Right arm)   Pulse 94   Temp 97.5  F (36.4  C) (Oral)   Resp 16   Wt 61.2 kg (134 lb 14.4 oz)   SpO2 98%   BMI 21.13 kg/m        [id number]  VSS on RA. ACHS, 75. Pt requested glucose gel. Recheck 84. Potassium 3.7, PO replacement given. Recheck in the am. Last dose of mag to be given at 2000. Recheck in the am. Abdominal pain on L side. Tylenol and scheduled robaxin given. NPO with ice chips. PIV w/LR @100ml/hr. G and J tube to gravity. 175 ml out of G. Compazine and benadryl given x1 for nausea. 7 small BMs per pt. Voiding, not saving. Up ad koffi. Scop. patch on R ear. CT guided aspiration of abdominal fluid done today. Will continue to monitor and notify team with changes.

## 2021-12-05 NOTE — PRE-PROCEDURE
GENERAL PRE-PROCEDURE:   Procedure:  Fluid aspiration    Written consent obtained?: Yes    Risks and benefits: Risks, benefits and alternatives were discussed    Consent given by:  Patient  Patient states understanding of procedure being performed: Yes    Patient's understanding of procedure matches consent: Yes    Procedure consent matches procedure scheduled: Yes    Expected level of sedation:  Moderate  Appropriately NPO:  Yes  ASA Class:  4  Mallampati  :  Grade 2- soft palate, base of uvula, tonsillar pillars, and portion of posterior pharyngeal wall visible  Lungs:  Lungs clear with good breath sounds bilaterally  Heart:  Normal heart sounds and rate  History & Physical reviewed:  History and physical reviewed and no updates needed  Statement of review:  I have reviewed the lab findings, diagnostic data, medications, and the plan for sedation

## 2021-12-05 NOTE — PROGRESS NOTES
Patient Name: Dinora Mcghee  Medical Record Number: [id number]  Today's Date: 12/5/2021    Procedure: abdominal fluid collection aspiration.  Proceduralist: Dr. DENITA Felipe, Dr. Sharath Mcarthur    Patient in room: 1415  Procedure Start: 1437  Procedure end: 1454  Sedation medications administered: 2 mg Versed, 100 mcg  Fentanyl.  Patient out of room: 1506    Report given to: FELICITA Haynes    Other Notes: Pt arrived to IR room CT 2 from 7206. Consent reviewed. Pt denies any questions or concerns regarding procedure. Pt positioned supine and monitored per protocol. Pt tolerated procedure without any noted complications. Pt transferred back to 7206.    Specimens sent to lab.

## 2021-12-05 NOTE — PROCEDURES
[facility]    Procedure: IR Procedure Note    Date/Time: 12/5/2021 2:59 PM  Performed by: Peewee Felipe DO  Authorized by: Sharath Mcarthur MD       UNIVERSAL PROTOCOL   Site Marked: NA  Prior Images Obtained and Reviewed:  Yes  Required items: Required blood products, implants, devices and special equipment available    Patient identity confirmed:  Verbally with patient, arm band, provided demographic data and hospital-assigned identification number  Patient was reevaluated immediately before administering moderate or deep sedation or anesthesia  Confirmation Checklist:  Patient's identity using two indicators, relevant allergies, procedure was appropriate and matched the consent or emergent situation and correct equipment/implants were available  Time out: Immediately prior to the procedure a time out was called    Universal Protocol: the Joint Commission Universal Protocol was followed    Preparation: Patient was prepped and draped in usual sterile fashion       ANESTHESIA    Anesthesia: Local infiltration  Local Anesthetic:  Lidocaine 1% without epinephrine      SEDATION  Patient Sedated: Yes    Sedation Type:  Moderate (conscious) sedation  Sedation:  See MAR for details  Vital signs: Vital signs monitored during sedation    See dictated procedure note for full details.  Findings: Small gas containing collecting adjacent to the percutanous J-tube. Aspirated under CT guidance with Anaya needle, small volume of purulent drainage was able to be aspirated (about 2 ml) obtained and sent for analysis. No immediate complication.    Specimens: fluid and/or tissue for gram stain and culture    Complications: None    Condition: Stable    Plan: Routine postprocedural care. Sample sent for laboratory analysis and culture.      PROCEDURE    Patient Tolerance:  Patient tolerated the procedure well with no immediate complications  Length of time physician/provider  present for 1:1 monitoring during sedation: 15

## 2021-12-06 LAB
ERYTHROCYTE [DISTWIDTH] IN BLOOD BY AUTOMATED COUNT: 13.1 % (ref 10–15)
GLUCOSE BLDC GLUCOMTR-MCNC: 113 MG/DL (ref 70–99)
GLUCOSE BLDC GLUCOMTR-MCNC: 120 MG/DL (ref 70–99)
GLUCOSE BLDC GLUCOMTR-MCNC: 70 MG/DL (ref 70–99)
GLUCOSE BLDC GLUCOMTR-MCNC: 73 MG/DL (ref 70–99)
GLUCOSE BLDC GLUCOMTR-MCNC: 88 MG/DL (ref 70–99)
GLUCOSE BLDC GLUCOMTR-MCNC: 89 MG/DL (ref 70–99)
HCT VFR BLD AUTO: 36.3 % (ref 35–47)
HGB BLD-MCNC: 12.3 G/DL (ref 11.7–15.7)
MAGNESIUM SERPL-MCNC: 1.8 MG/DL (ref 1.6–2.3)
MCH RBC QN AUTO: 32.1 PG (ref 26.5–33)
MCHC RBC AUTO-ENTMCNC: 33.9 G/DL (ref 31.5–36.5)
MCV RBC AUTO: 95 FL (ref 78–100)
PHOSPHATE SERPL-MCNC: 3.2 MG/DL (ref 2.5–4.5)
PLATELET # BLD AUTO: 527 10E3/UL (ref 150–450)
POTASSIUM BLD-SCNC: 3.8 MMOL/L (ref 3.4–5.3)
RBC # BLD AUTO: 3.83 10E6/UL (ref 3.8–5.2)
WBC # BLD AUTO: 7.5 10E3/UL (ref 4–11)

## 2021-12-06 PROCEDURE — 120N000011 HC R&B TRANSPLANT UMMC

## 2021-12-06 PROCEDURE — 84100 ASSAY OF PHOSPHORUS: CPT

## 2021-12-06 PROCEDURE — 250N000009 HC RX 250: Performed by: STUDENT IN AN ORGANIZED HEALTH CARE EDUCATION/TRAINING PROGRAM

## 2021-12-06 PROCEDURE — 83735 ASSAY OF MAGNESIUM: CPT | Performed by: SURGERY

## 2021-12-06 PROCEDURE — 250N000013 HC RX MED GY IP 250 OP 250 PS 637: Performed by: SURGERY

## 2021-12-06 PROCEDURE — 85027 COMPLETE CBC AUTOMATED: CPT

## 2021-12-06 PROCEDURE — 99233 SBSQ HOSP IP/OBS HIGH 50: CPT | Performed by: SURGERY

## 2021-12-06 PROCEDURE — 36415 COLL VENOUS BLD VENIPUNCTURE: CPT

## 2021-12-06 PROCEDURE — 250N000013 HC RX MED GY IP 250 OP 250 PS 637: Performed by: STUDENT IN AN ORGANIZED HEALTH CARE EDUCATION/TRAINING PROGRAM

## 2021-12-06 PROCEDURE — 36415 COLL VENOUS BLD VENIPUNCTURE: CPT | Performed by: SURGERY

## 2021-12-06 PROCEDURE — 999N000128 HC STATISTIC PERIPHERAL IV START W/O US GUIDANCE

## 2021-12-06 PROCEDURE — 250N000011 HC RX IP 250 OP 636

## 2021-12-06 PROCEDURE — 250N000011 HC RX IP 250 OP 636: Performed by: STUDENT IN AN ORGANIZED HEALTH CARE EDUCATION/TRAINING PROGRAM

## 2021-12-06 PROCEDURE — 258N000001 HC RX 258

## 2021-12-06 PROCEDURE — 84132 ASSAY OF SERUM POTASSIUM: CPT | Performed by: SURGERY

## 2021-12-06 RX ADMIN — OXYCODONE HYDROCHLORIDE 5 MG: 5 TABLET ORAL at 17:42

## 2021-12-06 RX ADMIN — SCOPALAMINE 1 PATCH: 1 PATCH, EXTENDED RELEASE TRANSDERMAL at 18:45

## 2021-12-06 RX ADMIN — ACETAMINOPHEN 650 MG: 325 TABLET, FILM COATED ORAL at 10:16

## 2021-12-06 RX ADMIN — DEXTROSE MONOHYDRATE 25 ML: 500 INJECTION PARENTERAL at 21:45

## 2021-12-06 RX ADMIN — PROCHLORPERAZINE EDISYLATE 10 MG: 5 INJECTION INTRAMUSCULAR; INTRAVENOUS at 11:52

## 2021-12-06 RX ADMIN — DIPHENHYDRAMINE HYDROCHLORIDE 25 MG: 50 INJECTION, SOLUTION INTRAMUSCULAR; INTRAVENOUS at 01:01

## 2021-12-06 RX ADMIN — FAMOTIDINE 20 MG: 20 TABLET ORAL at 08:08

## 2021-12-06 RX ADMIN — METHOCARBAMOL 750 MG: 750 TABLET ORAL at 14:44

## 2021-12-06 RX ADMIN — DEXTROSE MONOHYDRATE 25 ML: 500 INJECTION PARENTERAL at 16:56

## 2021-12-06 RX ADMIN — DOCUSATE SODIUM 50 MG AND SENNOSIDES 8.6 MG 2 TABLET: 8.6; 5 TABLET, FILM COATED ORAL at 08:08

## 2021-12-06 RX ADMIN — PANTOPRAZOLE SODIUM 40 MG: 20 TABLET, DELAYED RELEASE ORAL at 08:08

## 2021-12-06 RX ADMIN — FAMOTIDINE 20 MG: 20 TABLET ORAL at 20:13

## 2021-12-06 RX ADMIN — HYDROMORPHONE HYDROCHLORIDE 0.3 MG: 1 INJECTION, SOLUTION INTRAMUSCULAR; INTRAVENOUS; SUBCUTANEOUS at 21:16

## 2021-12-06 RX ADMIN — PANTOPRAZOLE SODIUM 40 MG: 20 TABLET, DELAYED RELEASE ORAL at 20:13

## 2021-12-06 RX ADMIN — DIPHENHYDRAMINE HYDROCHLORIDE 25 MG: 50 INJECTION, SOLUTION INTRAMUSCULAR; INTRAVENOUS at 11:52

## 2021-12-06 RX ADMIN — AMITRIPTYLINE HYDROCHLORIDE 60 MG: 10 TABLET, FILM COATED ORAL at 21:16

## 2021-12-06 RX ADMIN — METHOCARBAMOL 750 MG: 750 TABLET ORAL at 08:08

## 2021-12-06 RX ADMIN — PROCHLORPERAZINE EDISYLATE 10 MG: 5 INJECTION INTRAMUSCULAR; INTRAVENOUS at 19:23

## 2021-12-06 RX ADMIN — HYDROMORPHONE HYDROCHLORIDE 0.5 MG: 1 INJECTION, SOLUTION INTRAMUSCULAR; INTRAVENOUS; SUBCUTANEOUS at 01:14

## 2021-12-06 RX ADMIN — DIPHENHYDRAMINE HYDROCHLORIDE 25 MG: 50 INJECTION, SOLUTION INTRAMUSCULAR; INTRAVENOUS at 19:23

## 2021-12-06 RX ADMIN — PROCHLORPERAZINE EDISYLATE 10 MG: 5 INJECTION INTRAMUSCULAR; INTRAVENOUS at 01:04

## 2021-12-06 RX ADMIN — METHOCARBAMOL 750 MG: 750 TABLET ORAL at 20:13

## 2021-12-06 RX ADMIN — ERTAPENEM SODIUM 1 G: 1 INJECTION, POWDER, LYOPHILIZED, FOR SOLUTION INTRAMUSCULAR; INTRAVENOUS at 17:06

## 2021-12-06 RX ADMIN — LEVOTHYROXINE SODIUM 137 MCG: 0.14 TABLET ORAL at 08:08

## 2021-12-06 ASSESSMENT — ACTIVITIES OF DAILY LIVING (ADL)
ADLS_ACUITY_SCORE: 8
DEPENDENT_IADLS:: INDEPENDENT
ADLS_ACUITY_SCORE: 8

## 2021-12-06 NOTE — PROGRESS NOTES
Antimicrobial Stewardship Team Note    Antimicrobial Stewardship Program - A joint venture between [facility] and  Physicians to optimize antibiotic management.  NOT a formal consult - Restricted Antimicrobial Review     Patient: Dinora Mcghee  MRN: [id number]  Allergies: Apple, Corticosteroids, Depakote [divalproex sodium], Zithromax [azithromycin dihydrate], Bromfenac, Codeine, Darvocet [propoxyphene n-apap], Hydromorphone, Morphine, Nalbuphine, Nalbuphine hcl, Zosyn [piperacillin-tazobactam in d5w], Bactrim [sulfamethoxazole w-trimethoprim], Cats, Compazine [prochlorperazine], Dust mites, Grass, Ragweeds, Tape [adhesive tape], Tramadol, Trees, Zofran [ondansetron], and Flagyl [metronidazole]    Brief Summary: Dinora Mcghee is a 56 year old female with PMH of vera-en-Y gastric bypass, chronic pancreatitis 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 Recently admitted for clogged G-tube on 11/27/2021.    History of Present Illness: Patient developed severe left sided abdominal pain that worsened with tube feeds.  Per chart review, exquisite tenderness in area of her J tube is reported. Patient endorses being shaky, nauseated, dry heaves. She denied vomiting, fever, chills. On presentation, her vitals are all within normal range (pulse 87,temp 97.6, RR 20, /84 and spo2 98). Labs were largely unremarkable, which included WBC  of 9.6. 12/3 CT a/p w/ contrast showed left mid intraperitoneal contained leak/abscess just deep to the abdominal wall with fluid tract connecting to the superolateral aspect of  "the retention mushroom. XR percutaneous jejunostomy tube check was done on 12/05/2021 shows no evidence for contrast leakage or jejunal communication with the known left upper quadrant abscess. CT-guided abscess aspiration yielded only about 2 cc of purulent material. Peritoneal fluid cultures are pending (Gram stain with 2+ GPCs). She was started on Ertapenem 1 g Q 24 for intraabdominal infection.       Active Anti-infective Medications   (From admission, onward)                 Start     Stop    12/04/21 1600  ertapenem  1 g,   Intravenous,   EVERY 24 HOURS        Abscess        --                  Assessment: Abdominal wall contained leak/abscess s/p aspiration on 12/5 in the setting of recent J-tube placement (11/28). Patient has remained vitally stable.  Patient still reports some moderate pain, improving pain level following fluid aspiration (tenderness also noted to have improved significantly) and bowel movement per notes BM. Aspiration of grossly purulent material helped to optimize source control of an intraabdominal process. Patient  does not have a history of ESBL-producing organisms, desiring to conserve carbapenems for when they are truly indicated (known history of ESBLs or current microbiological data supportive. Acknowledging patient's listed Zosyn and metronidazole allergies, favor a retrial of Zosyn while hospitalized with close monitoring.  Zosyn allergy reaction reported as \"a diffuse rash that seemed to be drug related but could have also been related to soap in the hospital.\" This does not appear to be a serious IgE-mediated reaction. Considering the the nature of the reported allergy and Zosyn extended spectrum of coverage we recommend initiating Zosyn if patient tolerates it to prevent microbial resistance by preserving carbapenems(ertapenem) for ESBL infection. In light of discharge nearing, favor Augmentin over levofloxacin, conferring more reliable coverage against Streptococcus and " Enterococcus species. Patient has previously tolerated amoxicillin per chart review.     Recommendations:  Discontinue Ertapenem, transition to either Zosyn 3.375 g IV every 6 hours or Augmentin 875/125 mg PO twice daily - will defer duration to primary team    Pharmacy took the following actions:  . Note created and provider notified/paged      Discussed with ID Staff  Gregg Man MD and Rossi Pereira, PharmD, BCIDP     Rosalba Carlos  PharmD Candidate   [phone]

## 2021-12-06 NOTE — PROGRESS NOTES
0222-5125    /78 (BP Location: Left arm)   Pulse 91   Temp 97.8  F (36.6  C) (Oral)   Resp 14   Wt 61.2 kg (134 lb 14.4 oz)   SpO2 93%   BMI 21.13 kg/m      A&Ox4. Calm and cooperative with cares, pt appeared restless during the night with intermittent sleeping between cares.   VS: VSS, RA, Afebrile.   BG: ACHS, pt was 68, recheck after D5W infusion was 152, on-call notified and D5LR @100ml/hr.    Labs: No new labs this AM. Mag and Phos replacement Rn managed.   Pain/Nausea/PRN: Reported moderate pain, IV dilaudid given x1. Scopolamine patch behind right ear. IV benadryl/compozine given x1 at midnight.   Diet: NPO  LDA: Right PIV infiltrated NS flush, new Right PIV infusing D5LR, extremity elevated. Left J-tube green, thick output, 50mL, Right PEG 250mL clear, thin, pink-tinged output.   GI: No Bm this shift.   : Voiding good amounts, not saving.   Skin: Tape burns present on abdomin. No other issues of note.   Mobility: Up ad koffi.   Plan: Call light at bedside. Belongings within reach. Will continue to monitor and update provider with changes in condition.

## 2021-12-06 NOTE — PROGRESS NOTES
General Surgery Progress Note  12/06/2021   ------------------------------------------------------------------------------------------------  Subjective:  Patient overall feeling much better, abdominal pain significantly improved. She has noticed significant decrease of drainage around J tube site. No n/v. Adequate UO. Multiple BM yesterday, with decreased abdominal comfort following.   ------------------------------------------------------------------------------------------------  Objective:  Temp:  [97.5  F (36.4  C)-98.3  F (36.8  C)] 97.8  F (36.6  C)  Pulse:  [] 91  Resp:  [12-16] 14  BP: (109-123)/(67-78) 114/78  SpO2:  [93 %-100 %] 93 %    I/O last 3 completed shifts:  In: 2228.33 [P.O.:100; I.V.:2128.33]  Out: 475 [Emesis/NG output:475]      Gen: Awake, interactive, NAD  Resp: breathing non-labored on room air  CV: regular rate, appears well perfused  Abd: soft, nondistended, mildly tender to palpation around around J tube, significantly improved. J tube to gravity.      Labs:  Recent Labs   Lab 12/04/21  0659 12/03/21  1227   WBC 8.1 9.6   HGB 11.6* 13.1    439       Recent Labs   Lab 12/06/21  0651 12/05/21  2212 12/05/21  2145 12/05/21  1649 12/05/21  0813 12/05/21  0737 12/04/21  1133 12/04/21  0934 12/04/21  0818 12/04/21  0659 12/03/21 2025 12/03/21  1229 12/03/21  1227   NA  --   --   --   --   --   --   --   --   --  138  --   --  138   POTASSIUM 3.8  --   --   --   --  3.7  --   --   --  3.7   < >  --  4.2   CHLORIDE  --   --   --   --   --   --   --   --   --  103  --   --  104   CO2  --   --   --   --   --   --   --   --   --  29  --   --  29   BUN  --   --   --   --   --   --   --   --   --  8  --   --  11   CR  --   --   --   --   --   --   --   --   --  0.43*  --  0.5 0.55   GLC  --  152* 68* 80   < >  --    < >  --    < > 111*   < >  --  115*   KASSI  --   --   --   --   --   --   --   --   --  9.1  --   --  9.2   MAG 1.8  --   --   --   --  1.8  --  1.9  --   --   --   --  2.1     < > = values in this interval not displayed.       Imaging  Procedures 12/5/2021:  1. CT-guided abscess drain placement  History: small abscess  Comparison: CT from 12/3/2021  Staff: Sharath Mcarthur MD  Fellow/Resident: MAEGAN Felipe D.O.     Monitoring: Patient was placed on continuous monitoring with  intravenous conscious sedation administered by the IR nursing staff  and supervised by the IR attending. Patient remained stable throughout  the procedure.      Medications:  1. Versed IV: 2 mg  2. Fentanyl IV: 100 mcg  3. 10  cc 1% lidocaine     Sedation time, face-to-face: 20 minutes  DLP: 311 mGy*cm     Findings/procedure:     Prior to the procedure, both verbal and written informed consent  obtained from the patient. Patient placed supine on the CT table.  Preprocedure scan obtained revealing small gas containing collection  in the left lower quadrant adjacent to the J-tube insertion, similar  to previous CT. Retained contrast within the colon secondary to  fluoroscopic tube check from last 24 hours. Adequate percutaneous  approach for needle aspiration.      The left abdomen was prepped and draped. Timeout performed. 1%  Lidocaine used for local analgesia. Under intermittent CT fluoroscopic  guidance, a Anaya needle with a sharp and blunt stylette was  advanced into the small gas containing fluid collection. CT image  documenting needle position within the abscess was saved in the  patient's record. Needle stylette removed.      A small volume (about 2 mL's) of grossly purulent fluid was aspirated  and sent for labs. No additional fluid was aspirated. Insertion site  was dressed with a bandage.     Estimate blood loss: Minimal     Specimens: Culture and sensitivity.                                                                   Impression:  Uncomplicated CT fluoroscopic guided left lower quadrant  intraperitoneal abscess aspiration. Very small collection yielded only  about 2 cc of purulent material.     Plan:    Follow-up culture and sensitivity.     =======================================================  Assessment/Plan:   56 year old female with h/o TPAIT w/choledochoduodenostomy, duodenojejunostomy, Vera-Y reconstruction, gastroparesis and hypoglycemia, managed by a G-J tube, additional Jtube placed into mid jejunum downstream of the jejunojejunostomy (11/28) admitted for abd pain at the new J-tube site and CT-ABD pelvis with contained leak/abscess just deep to the abdominal wall. CT guided aspiration of fluid collection by IR 12/5, about 2cc purulent fluid. Abdominal pain/discomfort and tenderness significantly improved following aspiration and multiple BM yesterday.     - Plan to restart TF today, will discuss with GI  - Abx: Levaquin x 5 days for fluid collection gram stain gram positive cocci, will follow up cultures and adjust coverage as needed  - Possible discharge later today vs tomorrow if tolerating TF     Seen, examined, and discussed with chief resident Dr. Vincent, discussed with staff Dr. Walters.     Jania Soria, MS3    Sweta Gautam MD  General Surgery PGY-1

## 2021-12-06 NOTE — PROGRESS NOTES
GASTROENTEROLOGY PROGRESS NOTE    Date: 12/06/2021     ASSESSMENT:  Dinora Mcghee is a 56 year old female with PMHx of TPAIT w/choledochoduodenostomy, duodenojejunostomy, Vera-Y reconstruction (12/2016). PMHx includes suspected gastroparesis and hypoglycemia, managed by a G-J tube (last exchanged 11/28, RUQ), additional Jtube placed into mid jejunum downstream of the jejunojejunostomy (also on 11/28, LLQ) who presented with abd pain at the new J tube site with leaking TF formula. There is a fluid collections suspicious for an abscess around the J tube site.     # Loculated abscess s/p CT guided drainage   #. GJ Tube  #. Gastroparesis  #. Severe protein calorie malnutrition  Presenting symptoms with abd pain and weakness likely from abscess now s/p CT guided drainage on 12/5 with 2cc pus. Now with improved pain.     RECOMMENDATIONS:  -- Please start TF per nutrition recs at 10cc/hr then uptitrate per note.   -- Abx per primary team  -- GI will cont to follow    Gastroenterology follow up recommendations: Pending clinical course.      Thank you for involving us in this patient's care. Please do not hesitate to contact the GI service with any questions or concerns.      Pt care plan discussed with Dr. Park, GI staff physician.    Ilia Frank MD  Gastroenterology Fellow  [facility]  Text page [phone]    _______________________________________________________________      Subjective:  S/p CT guided drainage. Pain improved. Has not started TF. Overall feeling better.     Objective:  Blood pressure 112/65, pulse 93, temperature 97.3  F (36.3  C), temperature source Oral, resp. rate 16, weight 60.4 kg (133 lb 1.6 oz), SpO2 97 %, not currently breastfeeding.    Constitutional: Lying in bed, looks tired but non toxic.   Eyes: Conjunctiva anicteric  HEENT: Normal oropharynx without ulcers or exudate, mucus membranes moist  CV: No Tessa edema  Respiratory: Breathing comfortably on ambient air  Abd: Venting  Gtube in the RUQ.            PEJ in the LLQ. Mildly ttp, improved since admit and drainage.   Skin: warm, perfused, no jaundice  Neuro: A&O x 3, No asterixis      LABS:  BMP  Recent Labs   Lab 12/06/21  1136 12/06/21  0811 12/06/21  0651 12/05/21  2212 12/05/21  2145 12/05/21  0813 12/05/21  0737 12/04/21  0818 12/04/21  0659 12/04/21  0033 12/03/21 2025 12/03/21  1229 12/03/21  1227   NA  --   --   --   --   --   --   --   --  138  --   --   --  138   POTASSIUM  --   --  3.8  --   --   --  3.7  --  3.7  --  4.1  --  4.2   CHLORIDE  --   --   --   --   --   --   --   --  103  --   --   --  104   KASSI  --   --   --   --   --   --   --   --  9.1  --   --   --  9.2   CO2  --   --   --   --   --   --   --   --  29  --   --   --  29   BUN  --   --   --   --   --   --   --   --  8  --   --   --  11   CR  --   --   --   --   --   --   --   --  0.43*  --   --  0.5 0.55   GLC 88 113*  --  152* 68*   < >  --    < > 111*   < >  --   --  115*    < > = values in this interval not displayed.     CBC  Recent Labs   Lab 12/06/21  1053 12/04/21  0659 12/03/21  1227   WBC 7.5 8.1 9.6   RBC 3.83 3.62* 4.12   HGB 12.3 11.6* 13.1   HCT 36.3 35.3 39.4   MCV 95 98 96   MCH 32.1 32.0 31.8   MCHC 33.9 32.9 33.2   RDW 13.1 13.3 13.5   * 429 439     INR  Recent Labs   Lab 12/04/21  0659 12/03/21  1230   INR 1.05 0.97     LFTs  Recent Labs   Lab 12/03/21  1227   ALKPHOS 104   AST 11   ALT 19   BILITOTAL 0.5   PROTTOTAL 7.1   ALBUMIN 3.0*      PANC  Recent Labs   Lab 12/03/21  1227   LIPASE <10*       IMAGING:

## 2021-12-06 NOTE — PLAN OF CARE
/65 (BP Location: Right arm)   Pulse 93   Temp 97.3  F (36.3  C) (Oral)   Resp 16   Wt 60.4 kg (133 lb 1.6 oz)   SpO2 97%   BMI 20.85 kg/m      1746-0811. AVSS on RA. PRN tylenol given x 1 for abdominal and flank pain. PRN compazine + benadryl given x 1 for nausea.  & 88. Pt remains NPO. PIV is SL. Voiding, not saving. Pt reports large, loose BM. R j-tube site w/ scant pinkish output. L PEG site leaking copious amounts from site, scant yellowish output in bag. Up ad koffi. Will continue to monitor and update with any changes.

## 2021-12-06 NOTE — DISCHARGE SUMMARY
[facility]  Discharge Summary  General Surgery     Dinora Mcghee MRN# [id number]   YOB: 1965 Age: 56 year old     Date of Admission:  12/3/2021  Date of Discharge::  12/7/2021  6:14 PM  Admitting Physician:  Cleveland Sin MD  Discharge Physician:  Sebastian Walters DO  Primary Care Physician:        Latasha Paul          Admission Diagnoses:   Intra-abdominal abscess (H) [K65.1]  Postprocedural intraabdominal abscess [T81.43XA]            Discharge Diagnosis:   Same as above.          Procedures:   12/5 IR CT-guided fluid aspiration            Consultations:   OCCUPATIONAL THERAPY ADULT IP CONSULT  PHYSICAL THERAPY ADULT IP CONSULT  INTERVENTIONAL RADIOLOGY ADULT/PEDS IP CONSULT  VASCULAR ACCESS CARE ADULT IP CONSULT  VASCULAR ACCESS CARE ADULT IP CONSULT  VASCULAR ACCESS CARE ADULT IP CONSULT  VASCULAR ACCESS CARE ADULT IP CONSULT  VASCULAR ACCESS CARE ADULT IP CONSULT  VASCULAR ACCESS CARE ADULT IP CONSULT  CARE MANAGEMENT / SOCIAL WORK IP CONSULT          Brief History of Illness:     Dinora Mcghee is a 56 year old female with PMHx of TPAIT w/choledochoduodenostomy, duodenojejunostomy, Vera-Y reconstruction with Dr. Phoenix(12/2016- now follows with Dr. Honeycutt). PMHx includes suspected gastroparesis and hypoglycemia, managed with a G-J tube (last exchanged 11/28, RUQ), additional J tube placed into mid jejunum downstream of the jejunojejunostomy (also on 11/28, LLQ), who presented with abdominal pain at the new J-tube site with leaking TF formula.  Patent stated that after her J-tube placement on the 11/28, she has felt some discomfort while taking her tube feeds, particularly when rate is greater than 45cc/hr. For the past 3 days, patient had been experiencing increasing pain and nausea and vomited x3 during that time. She also noticed increasing amount of drainage around her J tube, which appeared to be TF formula. Patient denies fevers, chills, SOB, chest pain,  dysuria. She was seen in GI clinic by Dr. Park, who noted significant tenderness around her J tube and advised her to present to the ED for further evaluation.            Hospital Course:   In the ED, she was afebrile, vital signs stable. CT showed that J tube was well positioned, with a small intraperitoneal fluid collection near the J tube. IV antibiotics were started and abdominal exam was monitored overnight, remained stable. She was admitted to general surgery, closely followed and co-managed with gastroenterology.   12/5 XR percutaneous jejunostomy check again showed that J tube was well positioned, no evidence of leak or jejunal communication with the fluid collection. Interventional radiology was consulted and performed CT-guided aspiration of the fluid collection. The following day her tube feeds were restarted and slowly advanced to goal rate, which she tolerated well. She was discharged home on 12/7, when her pain was well controlled, she was tolerating TF at goal rate, she was having bowel function, she was voiding and ambulating independently. She will discharge home on levaquin, 5 days total, to cover polymicrobial growth in peritoneal fluid culture. She will follow up outpatient with gastroenterology in clinic.              Imaging Studies:     Results for orders placed or performed during the hospital encounter of 12/03/21   CT Abdomen Pelvis w Contrast    Narrative    EXAMINATION: CT ABDOMEN PELVIS W CONTRAST, 12/3/2021 3:29 PM    INDICATION: Concerned that J-tube is dislodged and the patient as  acute peritonitis.    COMPARISON STUDY: CT 9/18/2021    TECHNIQUE: CT scan of the abdomen and pelvis was performed following  the administration of IV contrast.    CONTRAST: 81 cc Isovue-250 injected IV with approximately 70 cc of  Omnipaque via llq J tube.    CT scan radiation dose is optimized to minimum requisite dose using  automated dose modulation techniques.    FINDINGS:  The left lower quadrant  jejunostomy tube is in place. Enteric contrast  remains within the lumen of the bowel without extraluminal contrast to  suggest perforation. Within the left upper quadrant mesentery there is  a gas and fluid containing loculated and peripherally enhancing  inflammatory collection in cross-section 1.9 x 3 cm (series 5 image  175) with a fluid-filled tubular tract extending from the jejunum  containing the tube for example on series 3 image 29 and axial series  5 image [id number].  No contrast opacification after administration of  enteric contrast.    No free fluid or free air in the abdomen or pelvis. No portal venous  gas. No pneumatosis.     Additional post surgical changes of distal pancreatectomy,  cholecystectomy, and splenectomy. Additional gastrostomy tube is in  place within the distal stomach.    There may be trace pleural effusions with bibasilar atelectasis.  Scattered calcified granulomas. No focal infectious consolidations.    No focal hepatic masses. Post cholecystectomy with  choledochoduodenostomy.  No hydronephrosis or obstructing urinary  calculi. The bladder is well-distended.    Normal caliber of the small and large bowel. No free fluid in the  abdomen or pelvis. No suspicious pelvic masses. Major intra-abdominal  vasculature without focal lesion. No substantial atherosclerotic  calcifications of the abdominal aorta or iliac arteries.    Mixed lytic and sclerotic lesion in the L2 vertebral body. No acute or  suspicious osseous abnormalities. Subcutaneous soft tissue stranding  along the left flank likely related to prior procedure.      Impression    IMPRESSION: In summary: new intraluminal j-tube mushroom retention  device, probable contained leak around mushroom tracking in two  directions to intraperitoneal 3 cm abscess and along the percutaneous  tract.     In detail:   1. New placement of left lower quadrant percutaneous jejunostomy with  intraluminal enteric contrast confirming intraluminal  placement of the  tube.  No extraluminal contrast to suggest perforation.  No  pneumoperitoneum or free fluid.  2. Left mid intraperitoneal contained leak/abscess just deep to the  abdominal wall with fluid tract connecting to superolateral aspect of  the retention mushroom. It is possible fluid is seeping out of the  small bowel around the top margin of the mushroom both intraperitoneal  to the collection and along the tract to the skin.  3. Postsurgical changes compatible with provided history including  distal pancreatectomy, cholecystectomy, splenectomy, with  choledochoduodenostomy and prior jamel en y gastric bypass.  4. Percutaneous gastrostomy tube is in place.    Impressions #1 and 2 discussed with ED provider Dr. Holman at 3:25 PM  and with Dr Frank of GI at 3:40 PM by Dr Ortiz on 12/3/2021.   Providers verbalized understanding.    I, RK TINSLEY MD, attest that I was present for all critical  portions of the procedure and was immediately available to provide  guidance and assistance during the remainder of the procedure.    I have personally reviewed the examination and initial interpretation  and I agree with the findings.    RK TINSLEY MD         SYSTEM ID:  [id number]   XR Gastrostomy Tube Check    Narrative    XR PERCUTANEOUS JEJUNOSTOMY TUBE CHECK on 12/5/2021 2:02 AM.    INDICATION: please inject Gastrografin through j tube on left side to  evaluate for leak.    COMPARISON: CT dated 12/3/2021.    FINDINGS:   Gastrografin contrast (120 mL) was injected through the percutaneous  jejunostomy tube in the left abdomen with patient in supine,  Trendelenburg, right posterior oblique and right lateral decubitus  positions. Contrast is seen within peristalsing jejunal bowel loops.  There is no contrast pooling outside of the bowel, particularly in the  area of the known abscess seen on recent CT.      Impression    IMPRESSION:   Percutaneous jejunostomy tube positioned within the jejunum.  No  evidence for contrast leakage or jejunal communication with the known  left upper quadrant abscess.    I have personally reviewed the examination and initial interpretation  and I agree with the findings.    YEISON MARIE MD         SYSTEM ID:  [id number]   CT Retroperitoneal Abscess Aspiration    Narrative    Procedures 12/5/2021:  1. CT-guided abscess drain placement    History: small abscess    Comparison: CT from 12/3/2021    Staff: Sharath Mcarthur MD    Fellow/Resident: MAEGAN Felipe D.O.    Monitoring: Patient was placed on continuous monitoring with  intravenous conscious sedation administered by the IR nursing staff  and supervised by the IR attending. Patient remained stable throughout  the procedure.     Medications:  1. Versed IV: 2 mg  2. Fentanyl IV: 100 mcg  3. 10  cc 1% lidocaine    Sedation time, face-to-face: 20 minutes    DLP: 311 mGy*cm    Findings/procedure:    Prior to the procedure, both verbal and written informed consent  obtained from the patient. Patient placed supine on the CT table.  Preprocedure scan obtained revealing small gas containing collection  in the left lower quadrant adjacent to the J-tube insertion, similar  to previous CT. Retained contrast within the colon secondary to  fluoroscopic tube check from last 24 hours. Adequate percutaneous  approach for needle aspiration.     The left abdomen was prepped and draped. Timeout performed. 1%  Lidocaine used for local analgesia. Under intermittent CT fluoroscopic  guidance, a Anaya needle with a sharp and blunt stylette was  advanced into the small gas containing fluid collection. CT image  documenting needle position within the abscess was saved in the  patient's record. Needle stylette removed.     A small volume (about 2 mL's) of grossly purulent fluid was aspirated  and sent for labs. No additional fluid was aspirated. Insertion site  was dressed with a bandage.    Estimate blood loss: Minimal    Specimens: Culture and  sensitivity.      Impression    Impression:  Uncomplicated CT fluoroscopic guided left lower quadrant  intraperitoneal abscess aspiration. Very small collection yielded only  about 2 cc of purulent material.    Plan:   Follow-up culture and sensitivity.     *Note: Due to a large number of results and/or encounters for the requested time period, some results have not been displayed. A complete set of results can be found in Results Review.              Medications Prior to Admission:     Medications Prior to Admission   Medication Sig Dispense Refill Last Dose     acetaminophen (TYLENOL) 500 MG tablet Take 2 tablets (1,000 mg) by mouth every 8 hours (Patient taking differently: Take 1,000 mg by mouth every 8 hours as needed )        amitriptyline (ELAVIL) 10 MG tablet Take 10 mg by mouth At Bedtime Take with a 50mg tab for a total of 60mg        amitriptyline (ELAVIL) 50 MG tablet Take 1 tablet (50 mg) by mouth At Bedtime 90 tablet 3      amylase-lipase-protease (CREON 24) 66469-88458 units CPEP per EC capsule Take 1-2 capsules by mouth Take with snacks or supplements        amylase-lipase-protease (CREON) 22200-85850 units CPEP per EC capsule Take 3-4 capsules by mouth 3 times daily (with meals) With oral meals        azelastine (ASTELIN) 0.1 % nasal spray Spray 1 spray into both nostrils 2 times daily 1 Bottle 11      blood glucose (PAT CONTOUR NEXT) test strip Use to test blood sugar 6 times daily or as directed. 2 Box 11      blood glucose monitoring (PAT MICROLET) lancets Use to test blood sugar 6-8 times daily or as directed. 100 each 11      cetirizine (ZYRTEC) 10 MG tablet Take 1 tablet (10 mg) by mouth daily (Patient taking differently: Take 10 mg by mouth every morning ) 90 tablet 3      Continuous Blood Gluc Sensor (FREESTYLE LISA 2 SENSOR) MISC 1 each every 14 days 2 each 11      cyclobenzaprine (FLEXERIL) 10 MG tablet Take 1 tablet (10 mg) by mouth 2 times daily as needed for muscle spasms 60 tablet  3      diphenhydrAMINE (BENADRYL ALLERGY) 25 MG capsule Take 1 capsule (25 mg) by mouth every 6 hours as needed for itching or allergies 56 capsule 0      estradiol (VAGIFEM) 10 MCG TABS vaginal tablet INSERT 1 TABLET(10 MCG) VAGINALLY 2 TIMES A WEEK 24 tablet 2      famotidine (PEPCID) 20 MG tablet Take 1 tablet (20 mg) by mouth 2 times daily (Patient taking differently: Take 20 mg by mouth every morning ) 180 tablet 3      Glucagon (GVOKE HYPOPEN 1-PACK) 1 MG/0.2ML SOAJ Inject 1 mg Subcutaneous once as needed (hypoglycemia with loss of consciousness) 0.2 mL 1      glucose 40 % GEL gel Take 15-30 g by mouth every 15 minutes as needed for low blood sugar 3 Tube 2      HYDROmorphone, STANDARD CONC, (DILAUDID) 1 MG/ML oral solution Take 1-2 mLs (1-2 mg) by mouth every 4 hours as needed for moderate to severe pain (Post G and J tube placement) 168 mL 0      ibuprofen (ADVIL/MOTRIN) 400 MG tablet Take 1 tablet (400 mg) by mouth every 6 hours as needed for moderate pain 30 tablet 0      levothyroxine (SYNTHROID/LEVOTHROID) 137 MCG tablet Take 1 tablet (137 mcg) by mouth daily 90 tablet 3      montelukast (SINGULAIR) 10 MG tablet TAKE 1 TABLET(10 MG) BY MOUTH AT BEDTIME 90 tablet 3      omeprazole (PRILOSEC) 40 MG DR capsule Take 1 capsule (40 mg) by mouth 2 times daily (Patient taking differently: Take 40 mg by mouth every evening ) 180 capsule 3      order for DME Equipment being ordered:Cefaly 1 Device 0      polyethylene glycol (MIRALAX) 17 GM/Dose powder Take 17 g (1 capful) by mouth daily (Patient taking differently: Take 1 capful by mouth every morning ) 507 g 11      prochlorperazine (COMPAZINE) 5 MG tablet Take 1 tablet (5 mg) by mouth every 6 hours as needed Take two 5 mg tablets by mouth every six hours as needed for nausea. 60 tablet 1      promethazine (PHENERGAN) 25 MG tablet Take 1 tablet (25 mg) by mouth daily as needed 60 tablet 1      Prucalopride Succinate (MOTEGRITY) 2 MG TABS Take 2 mg by mouth daily  (Patient taking differently: Take 2 mg by mouth every morning ) 30 tablet 11      scopolamine (TRANSDERM) 1 MG/3DAYS 72 hr patch Place 1 patch onto the skin every 72 hours 10 patch 11      senna-docusate (SENOKOT-S;PERICOLACE) 8.6-50 MG per tablet Take 1-2 tablets by mouth 2 times daily (Patient taking differently: Take 1-2 tablets by mouth every morning ) 100 tablet 0      Sharps Container (BD SHARPS ) MISC 1 Container as needed 1 each 1      ZOLMitriptan (ZOMIG) 5 MG tablet Take 1 tablet (5 mg) by mouth at onset of headache for migraine 9 tablet 11               Discharge Medications:     Current Discharge Medication List      CONTINUE these medications which have NOT CHANGED    Details   acetaminophen (TYLENOL) 500 MG tablet Take 2 tablets (1,000 mg) by mouth every 8 hours    Associated Diagnoses: Abdominal spasms      !! amitriptyline (ELAVIL) 10 MG tablet Take 10 mg by mouth At Bedtime Take with a 50mg tab for a total of 60mg      !! amitriptyline (ELAVIL) 50 MG tablet Take 1 tablet (50 mg) by mouth At Bedtime  Qty: 90 tablet, Refills: 3    Comments: Take with 1 10 mg tab at HS.  Associated Diagnoses: Headache disorder      !! amylase-lipase-protease (CREON 24) 03221-36428 units CPEP per EC capsule Take 1-2 capsules by mouth Take with snacks or supplements      !! amylase-lipase-protease (CREON) 50192-47546 units CPEP per EC capsule Take 3-4 capsules by mouth 3 times daily (with meals) With oral meals      azelastine (ASTELIN) 0.1 % nasal spray Spray 1 spray into both nostrils 2 times daily  Qty: 1 Bottle, Refills: 11    Associated Diagnoses: Seasonal allergies      blood glucose (PAT CONTOUR NEXT) test strip Use to test blood sugar 6 times daily or as directed.  Qty: 2 Box, Refills: 11    Associated Diagnoses: Post-pancreatectomy diabetes (H)      blood glucose monitoring (PAT MICROLET) lancets Use to test blood sugar 6-8 times daily or as directed.  Qty: 100 each, Refills: 11    Associated  Diagnoses: Acute on chronic pancreatitis (H)      cetirizine (ZYRTEC) 10 MG tablet Take 1 tablet (10 mg) by mouth daily  Qty: 90 tablet, Refills: 3    Associated Diagnoses: Elevated liver enzymes      Continuous Blood Gluc Sensor (FREESTYLE LISA 2 SENSOR) MISC 1 each every 14 days  Qty: 2 each, Refills: 11    Associated Diagnoses: Post-pancreatectomy diabetes (H)      cyclobenzaprine (FLEXERIL) 10 MG tablet Take 1 tablet (10 mg) by mouth 2 times daily as needed for muscle spasms  Qty: 60 tablet, Refills: 3    Associated Diagnoses: Gastroparesis; Abdominal pain, generalized      diphenhydrAMINE (BENADRYL ALLERGY) 25 MG capsule Take 1 capsule (25 mg) by mouth every 6 hours as needed for itching or allergies  Qty: 56 capsule, Refills: 0    Associated Diagnoses: Itching; Post-pancreatectomy diabetes (H); History of pancreatectomy; Pancreatic insufficiency      estradiol (VAGIFEM) 10 MCG TABS vaginal tablet INSERT 1 TABLET(10 MCG) VAGINALLY 2 TIMES A WEEK  Qty: 24 tablet, Refills: 2    Associated Diagnoses: Atrophic vaginitis      famotidine (PEPCID) 20 MG tablet Take 1 tablet (20 mg) by mouth 2 times daily  Qty: 180 tablet, Refills: 3    Associated Diagnoses: Gastroesophageal reflux disease without esophagitis      Glucagon (GVOKE HYPOPEN 1-PACK) 1 MG/0.2ML SOAJ Inject 1 mg Subcutaneous once as needed (hypoglycemia with loss of consciousness)  Qty: 0.2 mL, Refills: 1    Associated Diagnoses: Post-pancreatectomy diabetes (H)      glucose 40 % GEL gel Take 15-30 g by mouth every 15 minutes as needed for low blood sugar  Qty: 3 Tube, Refills: 2    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      HYDROmorphone, STANDARD CONC, (DILAUDID) 1 MG/ML oral solution Take 1-2 mLs (1-2 mg) by mouth every 4 hours as needed for moderate to severe pain (Post G and J tube placement)  Qty: 168 mL, Refills: 0    Associated Diagnoses: Acute post-operative pain      ibuprofen (ADVIL/MOTRIN) 400 MG tablet Take 1 tablet (400 mg)  by mouth every 6 hours as needed for moderate pain  Qty: 30 tablet, Refills: 0    Associated Diagnoses: Acute post-operative pain      levothyroxine (SYNTHROID/LEVOTHROID) 137 MCG tablet Take 1 tablet (137 mcg) by mouth daily  Qty: 90 tablet, Refills: 3    Associated Diagnoses: Hypothyroidism, unspecified type      montelukast (SINGULAIR) 10 MG tablet TAKE 1 TABLET(10 MG) BY MOUTH AT BEDTIME  Qty: 90 tablet, Refills: 3    Associated Diagnoses: Seasonal allergies      omeprazole (PRILOSEC) 40 MG DR capsule Take 1 capsule (40 mg) by mouth 2 times daily  Qty: 180 capsule, Refills: 3    Associated Diagnoses: Gastroesophageal reflux disease without esophagitis      order for DME Equipment being ordered:Cefaly  Qty: 1 Device, Refills: 0    Associated Diagnoses: Headache disorder      polyethylene glycol (MIRALAX) 17 GM/Dose powder Take 17 g (1 capful) by mouth daily  Qty: 507 g, Refills: 11    Associated Diagnoses: Slow transit constipation      prochlorperazine (COMPAZINE) 5 MG tablet Take 1 tablet (5 mg) by mouth every 6 hours as needed Take two 5 mg tablets by mouth every six hours as needed for nausea.  Qty: 60 tablet, Refills: 1    Associated Diagnoses: History of pancreatectomy; Pancreatic insufficiency      promethazine (PHENERGAN) 25 MG tablet Take 1 tablet (25 mg) by mouth daily as needed  Qty: 60 tablet, Refills: 1    Associated Diagnoses: Nausea      Prucalopride Succinate (MOTEGRITY) 2 MG TABS Take 2 mg by mouth daily  Qty: 30 tablet, Refills: 11    Associated Diagnoses: Chronic idiopathic constipation      scopolamine (TRANSDERM) 1 MG/3DAYS 72 hr patch Place 1 patch onto the skin every 72 hours  Qty: 10 patch, Refills: 11    Associated Diagnoses: Slow transit constipation      senna-docusate (SENOKOT-S;PERICOLACE) 8.6-50 MG per tablet Take 1-2 tablets by mouth 2 times daily  Qty: 100 tablet, Refills: 0    Associated Diagnoses: Incisional hernia, without obstruction or gangrene      Sharps Container (BD SHARPS  ) MISC 1 Container as needed  Qty: 1 each, Refills: 1    Associated Diagnoses: Post-pancreatectomy diabetes (H)      ZOLMitriptan (ZOMIG) 5 MG tablet Take 1 tablet (5 mg) by mouth at onset of headache for migraine  Qty: 9 tablet, Refills: 11    Associated Diagnoses: Headache disorder       !! - Potential duplicate medications found. Please discuss with provider.                  Medications Discontinued or Adjusted During This Hospitalization:   No change           Antibiotics Prescribed at Discharge:   Levaquin, , Duration: 5 days           Day of Discharge Physical Exam:   Temp:  [97.5  F (36.4  C)-98.3  F (36.8  C)] 97.9  F (36.6  C)  Pulse:  [] 94  Resp:  [12-16] 16  BP: ()/(55-78) 99/55  SpO2:  [93 %-100 %] 95 %    General: awake, alert, no acute distress, resting comfortably in bed   CV: warm, well perfused, regular rate   Pulm: breathing non-labored on room air   Abdomen: soft, non-distended, mildly TTP around J tube significantly improved. Scant drainage on dressing around J tube insertion site. G/J to gravity.    Extremities: no edema, moving all extremities spontaneously and without apparent deficit            Final Pathology Result:   No pathology sent.            Discharge Instructions and Follow-Up:     Discharge Procedure Orders   Home infusion referral   Referral Priority: Routine Referral Type: Consultation   Number of Visits Requested: 1     Home care nursing referral   Referral Priority: Routine Referral Type: Home Health Therapies & Aides   Number of Visits Requested: 1     Reason for your hospital stay   Order Comments: Abscess after J tube placement     Activity   Order Comments: Resume your usual activity as tolerated, no new restrictions.     Order Specific Question Answer Comments   Is discharge order? Yes      Adult [facility] Follow-up and recommended labs and tests   Order Comments: Please follow up with Gastroenterology, Dr. Park, either in clinic or by video visit.  Their office will schedule this follow up visit.     Appointments on [place] and/or [facility] (with [facility] or [facility] provider or service). Call [phone] if you haven't heard regarding these appointments within 7 days of discharge.     Diet   Order Comments: Resume your home diet/ tube feed regimen.     Order Specific Question Answer Comments   Is discharge order? Yes               Home Health Care:     Not needed           Discharge Disposition:     Discharged to home      Condition at discharge: Stable    Patient was seen, examined, and discussed on day of discharge with chief resident Dr. Vincent, who discussed with staff surgeon Dr. Zaira Waite.       Sweta Gautam MD PGY-1  General Surgery

## 2021-12-06 NOTE — PROGRESS NOTES
"CLINICAL NUTRITION SERVICES - ASSESSMENT NOTE     Nutrition Prescription    RECOMMENDATIONS FOR MDs/PROVIDERS TO ORDER:  1.Assess and Order TF per MNT protocol when medically able to begin TF     Malnutrition Status:    Moderate malnutrition in the context of acute on chronic illness    Recommendations already ordered by Registered Dietitian (RD):  None at this time     Future/Additional Recommendations:  1. Once able to advance/start TF recommend: [employer] Peptide 1.5 @ 45 mL/hr (1080 mL/day) to provide 1661 kcal (28 kcal/kg), 80 g protein (1.3 g/kg), 149 g CHO, 10 g fiber, 83 g fat (40% from MCTs), and 756 mL free water daily   -start at 10 ml/hr x 6 hours, then advance to goal continuous rate of 45 ml/hr  -change relizorb cartridge every 12 hours for PERT  - baseline phos     REASON FOR ASSESSMENT  Dinora Mcghee is a/an 56 year old female assessed by the dietitian for Admission Nutrition Risk Screen for positive    Per chart review patient with a history of jamel-en-Y gastric bypass, chronic pancreatitis status post total pancreatectomy with islet autotransplantation complicated by gastroparesis requiring tube feeds     NUTRITION HISTORY  Dinora reports she takes Creon 24 1 capsule with food. Usually, a cup of food at a time (soup, mashed potatoes and rice) or protein shakes (Premeir or ensure max protein)    She reports her TF regimen at home is [employer] 1.5 Peptide between 45-65 ml/hr. She reports after her last J-tube exchange she only was able to advance to goal of 45 ml/hr due to pain. She last received her TF Friday morning. She uses Relizorb with her TFs.    Dinora would like to start TF slow at 10 ml/hr and advance at 10 ml/hr     CURRENT NUTRITION ORDERS  Diet: NPO    LABS  Labs reviewed  (12/6): K+ 3.8 mmol/L, Mg ++ 1.8 mg/dL     MEDICATIONS  Medications reviewed    ANTHROPOMETRICS  Height: 170.2 cm (5'7\")  Most Recent Weight: 61.2 kg (134 lb 14.4 oz)    IBW: 67.3 kg  BMI: Normal BMI  Weight " History:   Wt Readings from Last 10 Encounters:   12/05/21 61.2 kg (134 lb 14.4 oz)   11/28/21 60.3 kg (133 lb)   11/03/21 64 kg (141 lb)   10/19/21 59.7 kg (131 lb 9.8 oz)   10/07/21 60.6 kg (133 lb 9.6 oz)   09/18/21 59 kg (130 lb)   09/16/21 59.9 kg (132 lb)   09/12/21 60.2 kg (132 lb 12.8 oz)   08/09/21 63.5 kg (140 lb)   08/05/21 64.4 kg (142 lb)   Usual body weight per Dinora is 136 lbs now 133 lbs in 1 week   Dosing Weight: 60 kg (lowest weight this admission - 60.4 kg (12/6)    ASSESSED NUTRITION NEEDS  Estimated Energy Needs: 3613-3633+ kcals/day (25 - 30+ kcals/kg)  Justification: Maintenance  Estimated Protein Needs: 72-90 grams protein/day (1.2 - 1.5 grams of pro/kg)  Justification: Repletion  Estimated Fluid Needs: (1 mL/kcal)   Justification: Maintenance    PHYSICAL FINDINGS  See malnutrition section below.     MALNUTRITION  % Intake: Decreased intake does not meet criteria  % Weight Loss: > 2% in 1 week (severe)  Subcutaneous Fat Loss: Facial region:  mild  Muscle Loss: Temporal:  mild and Thoracic region (clavicle, acromium bone, deltoid, trapezius, pectoral):  mild  Fluid Accumulation/Edema: None noted  Malnutrition Diagnosis: Moderate malnutrition in the context of acute on chronic illness    NUTRITION DIAGNOSIS  Inadequate oral intake related to decreased appetite as evidenced by need for alternative route for nutrition to meet 100% of estimated nutritional needs    INTERVENTIONS  Implementation  Nutrition Education: See education flowsheet     Goals  Diet adv v nutrition support within 2-3 days.     Monitoring/Evaluation  Progress toward goals will be monitored and evaluated per protocol.    Angelika Feliciano, MS/RD/LD/IGNACIAC  7A RD Pager: [phone]    Addendum to above note (12/6 at 1544)  Received order to Assess and Order TF per MNT protocol    Discussed with team. Team okayed this writer to order above recommendations.

## 2021-12-06 NOTE — CONSULTS
Care Management Initial Consult    General Information  Assessment completed with: Patient,Care Team Member,-chart review,    Type of CM/SW Visit: Initial Assessment    Primary Care Provider verified and updated as needed: Yes   Readmission within the last 30 days:        Reason for Consult: care coordination/care conference,discharge planning  Advance Care Planning:            Communication Assessment  Patient's communication style: spoken language (English or Bilingual)    Hearing Difficulty or Deaf: no   Wear Glasses or Blind: yes    Cognitive  Cognitive/Neuro/Behavioral: WDL                      Living Environment:   People in home: spouse     Current living Arrangements: house      Able to return to prior arrangements: yes       Family/Social Support:  Care provided by: self  Provides care for: no one  Marital Status:             Description of Support System: Supportive,Involved    Support Assessment: Adequate family and caregiver support    Current Resources:   Patient receiving home care services: Yes  Skilled Home Care Services: Skilled Nursing  Community Resources: Home Care,Home Infusion  Equipment currently used at home: none  Supplies currently used at home: Enteral Nutrition & Supplies    Employment/Financial:  Employment Status: employed full-time (self employed)        Financial Concerns: other (see comments) (self employed,health issues limiting work ability)           Lifestyle & Psychosocial Needs:  Social Determinants of Health     Tobacco Use: Medium Risk     Smoking Tobacco Use: Former Smoker     Smokeless Tobacco Use: Never Used   Alcohol Use: Not on file   Financial Resource Strain: Not on file   Food Insecurity: Not on file   Transportation Needs: Not on file   Physical Activity: Not on file   Stress: Not on file   Social Connections: Not on file   Intimate Partner Violence: Not on file   Depression: Not at risk     PHQ-2 Score: 2   Housing Stability: Not on file        Functional Status:  Prior to admission patient needed assistance:   Dependent ADLs:: Independent  Dependent IADLs:: Independent       Additional Information:  Pt readmitted with c/o abdominal pain, leaking GJ tube.  Pt with complex medical history significant for  TPAIT w/choledochoduodenostomy, duodenojejunostomy, Vera-Y reconstruction, gastroparesis and hypoglycemia, managed by a G-J tube.    Per chart review pt is open to [facility] for home enteral and [facility] for home RN services.     Met with pt.  Introduced RNCC role.Pt confirmed she remains open [facility] and [facility].   Pt notes no concerns at this time.    Resumption of home infusion and home care orders placed.    Alina Joseph RN BSN, PHN, ACM-RN  7A RN Care Coordinator  Phone: [phone]  Pager [phone]  To contact the weekend RNCC, Page: [phone]    12/6/2021 1:59 PM

## 2021-12-06 NOTE — PROGRESS NOTES
GASTROENTEROLOGY PROGRESS NOTE    Date: 12/05/2021     ASSESSMENT  Dinora Mcghee is a 56 year old female with PMHx of TPAIT w/choledochoduodenostomy, duodenojejunostomy, Vera-Y reconstruction (12/2016). PMHx includes suspected gastroparesis and hypoglycemia, managed by a G-J tube (last exchanged 11/28, RUQ), additional Jtube placed into mid jejunum downstream of the jejunojejunostomy (also on 11/28, LLQ) who presented with abd pain at the new J tube site with leaking TF formula. There is a fluid collections suspicious for an abscess around the J tube site.     RECOMMENDATIONS  - Discussed with IR today, we recommend proceeding with CT-guided aspiration today.  - GI will continue to follow with you.     Gastroenterology follow up recommendations: Pending clinical course.      Thank you for involving us in this patient's care. Please do not hesitate to contact the GI service with any questions or concerns.      Pt care plan discussed with TRUNG Choudhury, GI staff physician.    Som Hernandez MD  Gastroenterology Fellow  Division of Gastroenterology, Hepatology and Nutrition  [facility]    _______________________________________________________________    24 Hour Events:  No fevers, no leukocytosis. tachycardia and ongoing drainage from the J tube site. Drain study 12/4/21 did not show leakage. Improving pain.     Subjective:  Improving pain on the left side around the direct J-tube. There is ongoing drainage as well from the J-tube site.     Objective:  Blood pressure 112/68, pulse 89, temperature 97.9  F (36.6  C), temperature source Oral, resp. rate 16, weight 61.2 kg (134 lb 14.4 oz), SpO2 96 %, not currently breastfeeding.    Gen: A&Ox3, NAD  HEENT: ncat, perrl, eomi, sclera anicteric  CV: RRR  Lungs: CTA b/l  Abd: Tenderness around the J-tube, +bs, soft, nd/nt  Skin: no jaundice, no stigmata of chronic liver disease  MS: appropriate muscle mass for age  Neuro: non focal        LABS:  BMP  Recent Labs   Lab 12/05/21  1649 12/05/21  1116 12/05/21  1024 12/05/21  0952 12/05/21  0813 12/05/21  0737 12/04/21  0818 12/04/21  0659 12/04/21  0033 12/03/21 2025 12/03/21  1229 12/03/21  1227   NA  --   --   --   --   --   --   --  138  --   --   --  138   POTASSIUM  --   --   --   --   --  3.7  --  3.7  --  4.1  --  4.2   CHLORIDE  --   --   --   --   --   --   --  103  --   --   --  104   KASSI  --   --   --   --   --   --   --  9.1  --   --   --  9.2   CO2  --   --   --   --   --   --   --  29  --   --   --  29   BUN  --   --   --   --   --   --   --  8  --   --   --  11   CR  --   --   --   --   --   --   --  0.43*  --   --  0.5 0.55   GLC 80 115* 84 75   < >  --    < > 111*   < >  --   --  115*    < > = values in this interval not displayed.     CBC  Recent Labs   Lab 12/04/21  0659 12/03/21  1227   WBC 8.1 9.6   RBC 3.62* 4.12   HGB 11.6* 13.1   HCT 35.3 39.4   MCV 98 96   MCH 32.0 31.8   MCHC 32.9 33.2   RDW 13.3 13.5    439     INR  Recent Labs   Lab 12/04/21  0659 12/03/21  1230   INR 1.05 0.97     LFTs  Recent Labs   Lab 12/03/21  1227   ALKPHOS 104   AST 11   ALT 19   BILITOTAL 0.5   PROTTOTAL 7.1   ALBUMIN 3.0*      PANC  Recent Labs   Lab 12/03/21  1227   LIPASE <10*       IMAGING:  CT abdomen with oral contrast 12/3/21  In detail:   1. New placement of left lower quadrant percutaneous jejunostomy with  intraluminal enteric contrast confirming intraluminal placement of the  tube.  No extraluminal contrast to suggest perforation.  No  pneumoperitoneum or free fluid.  2. Left mid intraperitoneal contained leak/abscess just deep to the  abdominal wall with fluid tract connecting to superolateral aspect of  the retention mushroom. It is possible fluid is seeping out of the  small bowel around the top margin of the mushroom both intraperitoneal  to the collection and along the tract to the skin.  3. Postsurgical changes compatible with provided history including  distal  pancreatectomy, cholecystectomy, splenectomy, with  choledochoduodenostomy and prior jamel en y gastric bypass.  4. Percutaneous gastrostomy tube is in place.

## 2021-12-07 ENCOUNTER — HOME INFUSION (PRE-WILLOW HOME INFUSION) (OUTPATIENT)
Dept: PHARMACY | Facility: CLINIC | Age: 56
End: 2021-12-07
Payer: COMMERCIAL

## 2021-12-07 VITALS
WEIGHT: 131.8 LBS | RESPIRATION RATE: 16 BRPM | BODY MASS INDEX: 20.64 KG/M2 | DIASTOLIC BLOOD PRESSURE: 70 MMHG | TEMPERATURE: 97.9 F | SYSTOLIC BLOOD PRESSURE: 108 MMHG | OXYGEN SATURATION: 98 % | HEART RATE: 85 BPM

## 2021-12-07 LAB
ANION GAP SERPL CALCULATED.3IONS-SCNC: 8 MMOL/L (ref 3–14)
BUN SERPL-MCNC: 5 MG/DL (ref 7–30)
CALCIUM SERPL-MCNC: 8.8 MG/DL (ref 8.5–10.1)
CHLORIDE BLD-SCNC: 104 MMOL/L (ref 94–109)
CO2 SERPL-SCNC: 27 MMOL/L (ref 20–32)
CREAT SERPL-MCNC: 0.61 MG/DL (ref 0.52–1.04)
GFR SERPL CREATININE-BSD FRML MDRD: >90 ML/MIN/1.73M2
GLUCOSE BLD-MCNC: 101 MG/DL (ref 70–99)
GLUCOSE BLDC GLUCOMTR-MCNC: 114 MG/DL (ref 70–99)
GLUCOSE BLDC GLUCOMTR-MCNC: 86 MG/DL (ref 70–99)
GLUCOSE BLDC GLUCOMTR-MCNC: 92 MG/DL (ref 70–99)
GLUCOSE BLDC GLUCOMTR-MCNC: 94 MG/DL (ref 70–99)
HOLD SPECIMEN: NORMAL
MAGNESIUM SERPL-MCNC: 2.1 MG/DL (ref 1.6–2.3)
PHOSPHATE SERPL-MCNC: 3.5 MG/DL (ref 2.5–4.5)
POTASSIUM BLD-SCNC: 3.7 MMOL/L (ref 3.4–5.3)
SODIUM SERPL-SCNC: 139 MMOL/L (ref 133–144)

## 2021-12-07 PROCEDURE — 84100 ASSAY OF PHOSPHORUS: CPT

## 2021-12-07 PROCEDURE — 36415 COLL VENOUS BLD VENIPUNCTURE: CPT

## 2021-12-07 PROCEDURE — 250N000011 HC RX IP 250 OP 636

## 2021-12-07 PROCEDURE — 80048 BASIC METABOLIC PNL TOTAL CA: CPT

## 2021-12-07 PROCEDURE — 99239 HOSP IP/OBS DSCHRG MGMT >30: CPT | Performed by: SURGERY

## 2021-12-07 PROCEDURE — 250N000013 HC RX MED GY IP 250 OP 250 PS 637: Performed by: STUDENT IN AN ORGANIZED HEALTH CARE EDUCATION/TRAINING PROGRAM

## 2021-12-07 PROCEDURE — 83735 ASSAY OF MAGNESIUM: CPT

## 2021-12-07 PROCEDURE — 250N000011 HC RX IP 250 OP 636: Performed by: STUDENT IN AN ORGANIZED HEALTH CARE EDUCATION/TRAINING PROGRAM

## 2021-12-07 RX ORDER — LEVOFLOXACIN 500 MG/1
500 TABLET, FILM COATED ORAL DAILY
Qty: 5 TABLET | Refills: 0 | Status: SHIPPED | OUTPATIENT
Start: 2021-12-07 | End: 2021-12-12

## 2021-12-07 RX ORDER — ACETAMINOPHEN 325 MG/1
650 TABLET ORAL EVERY 4 HOURS PRN
Qty: 360 TABLET | Refills: 0 | Status: SHIPPED | OUTPATIENT
Start: 2021-12-07 | End: 2022-01-06

## 2021-12-07 RX ORDER — OXYCODONE HYDROCHLORIDE 5 MG/1
5 TABLET ORAL EVERY 6 HOURS PRN
Qty: 12 TABLET | Refills: 0 | Status: ON HOLD | OUTPATIENT
Start: 2021-12-07 | End: 2022-02-03

## 2021-12-07 RX ORDER — LIDOCAINE 4 G/G
1 PATCH TOPICAL EVERY 24 HOURS
Qty: 10 PATCH | Refills: 0 | Status: ON HOLD | OUTPATIENT
Start: 2021-12-07 | End: 2023-01-08

## 2021-12-07 RX ORDER — METHOCARBAMOL 750 MG/1
750 TABLET, FILM COATED ORAL 3 TIMES DAILY
Qty: 21 TABLET | Refills: 0 | Status: SHIPPED | OUTPATIENT
Start: 2021-12-07 | End: 2021-12-14

## 2021-12-07 RX ADMIN — ERTAPENEM SODIUM 1 G: 1 INJECTION, POWDER, LYOPHILIZED, FOR SOLUTION INTRAMUSCULAR; INTRAVENOUS at 16:37

## 2021-12-07 RX ADMIN — METHOCARBAMOL 750 MG: 750 TABLET ORAL at 07:52

## 2021-12-07 RX ADMIN — LEVOTHYROXINE SODIUM 137 MCG: 0.14 TABLET ORAL at 07:51

## 2021-12-07 RX ADMIN — FAMOTIDINE 20 MG: 20 TABLET ORAL at 07:52

## 2021-12-07 RX ADMIN — OXYCODONE HYDROCHLORIDE 5 MG: 5 TABLET ORAL at 13:58

## 2021-12-07 RX ADMIN — PANTOPRAZOLE SODIUM 40 MG: 20 TABLET, DELAYED RELEASE ORAL at 07:52

## 2021-12-07 RX ADMIN — OXYCODONE HYDROCHLORIDE 5 MG: 5 TABLET ORAL at 02:58

## 2021-12-07 RX ADMIN — METHOCARBAMOL 750 MG: 750 TABLET ORAL at 14:03

## 2021-12-07 RX ADMIN — PROCHLORPERAZINE EDISYLATE 10 MG: 5 INJECTION INTRAMUSCULAR; INTRAVENOUS at 12:48

## 2021-12-07 RX ADMIN — DIPHENHYDRAMINE HYDROCHLORIDE 25 MG: 50 INJECTION, SOLUTION INTRAMUSCULAR; INTRAVENOUS at 12:48

## 2021-12-07 RX ADMIN — LIDOCAINE 1 PATCH: 560 PATCH PERCUTANEOUS; TOPICAL; TRANSDERMAL at 10:47

## 2021-12-07 RX ADMIN — DOCUSATE SODIUM 50 MG AND SENNOSIDES 8.6 MG 1 TABLET: 8.6; 5 TABLET, FILM COATED ORAL at 07:52

## 2021-12-07 ASSESSMENT — ACTIVITIES OF DAILY LIVING (ADL)
ADLS_ACUITY_SCORE: 8

## 2021-12-07 NOTE — PLAN OF CARE
/74 (BP Location: Right arm)   Pulse 96   Temp 97.5  F (36.4  C) (Oral)   Resp 16   Wt 59.8 kg (131 lb 12.8 oz)   SpO2 98%   BMI 20.64 kg/m      4936-4751. AVSS on RA. BG 92 & 94. PRN compazine + benadryl given x 1 for pain. Lidocane patch placed on L lower flank for pain. NPO, sips of water ok per MD. L side J-tube w/ continuous feeds @ 40, goal 45 to advance at 1900. G-tube to gravity, small amounts of clear output. Voiding, not saving. No BM reported this shift. Up ad koffi. Will continue to monitor and update with any changes.

## 2021-12-07 NOTE — PROGRESS NOTES
General Surgery Progress Note  12/07/2021   ------------------------------------------------------------------------------------------------  Subjective:  Patient overall feeling much better since admission, abdominal pain improved, significant decrease of drainage around J tube site. Restarted TF yesterday afternoon, currently 30/hr.  Patient reports some mild nausea initially, relief with compazine and scopolamine patch, denies nausea this morning . Voiding independently. Multiple BM. Ambulating without difficulty.  ------------------------------------------------------------------------------------------------  Objective:  Temp:  [97.3  F (36.3  C)-98.1  F (36.7  C)] 97.5  F (36.4  C)  Pulse:  [90-99] 99  Resp:  [16-18] 16  BP: (104-118)/(65-80) 104/70  SpO2:  [95 %-98 %] 95 %    I/O last 3 completed shifts:  In: 360 [NG/GT:180]  Out: -       Gen: Awake, interactive, NAD  Resp: breathing non-labored on room air  CV: regular rate, appears well perfused  Abd: soft, nondistended, mildly tender to palpation around around J tube- significantly improved. Minimal drainage on dressing around left side J tube. G/J to gravity.      Labs:  Recent Labs   Lab 12/06/21  1053 12/04/21  0659 12/03/21  1227   WBC 7.5 8.1 9.6   HGB 12.3 11.6* 13.1   * 429 439       Recent Labs   Lab 12/07/21  0639 12/07/21  0247 12/06/21  2249 12/06/21  1645 12/06/21  1622 12/06/21  0811 12/06/21  0651 12/05/21  0813 12/05/21  0737 12/04/21  0818 12/04/21  0659 12/03/21 2025 12/03/21  1229 12/03/21  1227     --   --   --   --   --   --   --   --   --  138  --   --  138   POTASSIUM 3.7  --   --   --   --   --  3.8  --  3.7  --  3.7   < >  --  4.2   CHLORIDE 104  --   --   --   --   --   --   --   --   --  103  --   --  104   CO2 27  --   --   --   --   --   --   --   --   --  29  --   --  29   BUN 5*  --   --   --   --   --   --   --   --   --  8  --   --  11   CR 0.61  --   --   --   --   --   --   --   --   --  0.43*  --  0.5 0.55    * 86 120*   < >  --    < >  --    < >  --    < > 111*   < >  --  115*   KASSI 8.8  --   --   --   --   --   --   --   --   --  9.1  --   --  9.2   MAG 2.1  --   --   --   --   --  1.8  --  1.8   < >  --   --   --  2.1   PHOS 3.5  --   --   --  3.2  --   --   --   --   --   --   --   --   --     < > = values in this interval not displayed.       Imaging  No new imaging.      =======================================================  Assessment/Plan:   56 year old female with h/o TPAIT w/choledochoduodenostomy, duodenojejunostomy, Vera-Y reconstruction, gastroparesis and hypoglycemia, managed by a G-J tube, additional Jtube placed into mid jejunum downstream of the jejunojejunostomy (11/28) admitted for abd pain at the new J-tube site and CT-ABD pelvis with contained leak/abscess just deep to the abdominal wall. CT guided aspiration of fluid collection by IR 12/5, about 2cc purulent fluid. Abdominal pain/discomfort and tenderness significantly improved following aspiration and multiple BMs.   Restarted TF 12/6, patient tolerating well.     - Continue TF, increase by 10ml/hr q6hrs until goal of 45/hr, Relizorb   - Restart home motegrity  - Continue scopolamine patch, PRN anti-emetics for nausea  - Continue bowel regimen  - Possible discharge later today vs tomorrow if tolerating TF       Seen, examined, and discussed with chief resident Dr. Vincent, to be discussed with staff Dr. Walters.     Jania Soria, MS3    Sweta Gautam MD  General Surgery PGY-1

## 2021-12-07 NOTE — PROGRESS NOTES
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 PMHx includes suspected gastroparesis and hypoglycemia, managed by a G-J tube (last exchanged 11/28, RUQ), additional Jtube placed into mid jejunum downstream of the jejunojejunostomy (also on 11/28, LLQ) who presented with abd pain at the new J tube site with leaking TF formula. There is a fluid collections suspicious for an abscess around the J tube site.      # Loculated abscess s/p CT guided drainage   #. GJ Tube  #. Gastroparesis  #. Severe protein calorie malnutrition  Presenting symptoms with abd pain and weakness likely from abscess now s/p CT guided drainage on 12/5 with 2cc pus. Now with improved pain and tolerating TF up to 30cc without N/V. Still with the left lower back pain in the paraspinal area (likely msk).     RECOMMENDATIONS:  -- Abx per primary team  -- Consider topical treatment for back pain with lidocaine patch vs diclofenac gel.   -- Cont to advance TF per nutrition recs. Once able to tolerate goal of 45cc/hr, is safe to discharge from GI standpoint.   -- Message sent to arrange f/u with Dr. Park.     Gastroenterology follow up recommendations: Pending clinical course.      Thank you for involving us in this patient's care. Please do not hesitate to contact the GI service with any questions or concerns.      Pt care plan discussed with Dr. Park, GI staff physician.    Ilia Frank MD  Gastroenterology Fellow  [facility]  Text page [phone]    _______________________________________________________________      Subjective:  NAEO. TF up to 30cc this morning without N/V. Overall feeling better. Still have lower back pain on the left after the procedure.     Objective:  Blood pressure 118/74, pulse 96, temperature 97.5  F (36.4  C), temperature source Oral, resp. rate 16,  weight 59.8 kg (131 lb 12.8 oz), SpO2 98 %, not currently breastfeeding.    Constitutional: Lying in bed, looks tired but non toxic.   Eyes: Conjunctiva anicteric  HEENT: Normal oropharynx without ulcers or exudate, mucus membranes moist  CV: No Tessa edema  Respiratory: Breathing comfortably on ambient air  Abd: Venting Gtube in the RUQ.            PEJ in the LLQ. Mildly ttp, improved since admit and drainage.   Skin: warm, perfused, no jaundice  Neuro: A&O x 3, No asterixis      LABS:  BMP  Recent Labs   Lab 12/07/21  1217 12/07/21  0804 12/07/21  0639 12/07/21  0247 12/06/21  0811 12/06/21  0651 12/05/21  0813 12/05/21  0737 12/04/21  0818 12/04/21  0659 12/03/21  2025 12/03/21  1229 12/03/21  1227   NA  --   --  139  --   --   --   --   --   --  138  --   --  138   POTASSIUM  --   --  3.7  --   --  3.8  --  3.7  --  3.7   < >  --  4.2   CHLORIDE  --   --  104  --   --   --   --   --   --  103  --   --  104   KASSI  --   --  8.8  --   --   --   --   --   --  9.1  --   --  9.2   CO2  --   --  27  --   --   --   --   --   --  29  --   --  29   BUN  --   --  5*  --   --   --   --   --   --  8  --   --  11   CR  --   --  0.61  --   --   --   --   --   --  0.43*  --  0.5 0.55   GLC 94 92 101* 86   < >  --    < >  --    < > 111*   < >  --  115*    < > = values in this interval not displayed.     CBC  Recent Labs   Lab 12/06/21  1053 12/04/21  0659 12/03/21  1227   WBC 7.5 8.1 9.6   RBC 3.83 3.62* 4.12   HGB 12.3 11.6* 13.1   HCT 36.3 35.3 39.4   MCV 95 98 96   MCH 32.1 32.0 31.8   MCHC 33.9 32.9 33.2   RDW 13.1 13.3 13.5   * 429 439     INR  Recent Labs   Lab 12/04/21  0659 12/03/21  1230   INR 1.05 0.97     LFTs  Recent Labs   Lab 12/03/21  1227   ALKPHOS 104   AST 11   ALT 19   BILITOTAL 0.5   PROTTOTAL 7.1   ALBUMIN 3.0*      PANC  Recent Labs   Lab 12/03/21  1227   LIPASE <10*       IMAGING:       General

## 2021-12-07 NOTE — PLAN OF CARE
/70 (BP Location: Right arm)   Pulse 99   Temp 97.5  F (36.4  C) (Oral)   Resp 16   Wt 60.4 kg (133 lb 1.6 oz)   SpO2 95%   BMI 20.85 kg/m      Shift: 4026-5061  VS: stable on RA, afebrile  Neuro: AOx4  BG: ACHS, BS @ 0200 was 86  Respiratory: clear lung sounds, non labored breathing   Pain/Nausea/PRN: denies nausea, oxy given 1 time this shift   Diet: NPO except for meds   LDA: PIV SL, TF per Jtube continuous @ 30 ml/hr and increase by 10 ml/hr Q6hrs with 60 ml of water flushes  GTube to gravity   GI/: No bm, voiding well  Skin: tape burns on abdomen  Mobility: up ad koffi  Plan: Continue plan of care and manage pain/discomfort.     Will give report to oncoming nurse. Pt left in stable condition, care relinquished at this time.

## 2021-12-07 NOTE — PLAN OF CARE
/70 (BP Location: Left arm)   Pulse 85   Temp 97.9  F (36.6  C) (Oral)   Resp 16   Wt 59.8 kg (131 lb 12.8 oz)   SpO2 98%   BMI 20.64 kg/m      Shift: 3256-9541 discharge  Isolation Status: NA  VS: WDL on RA, afebrile  Neuro: Aox4  Behaviors: pleasant, cooperative with cares, able to make her needs known  BG: NA  Labs: NA  Respiratory: Clear lung sounds, no shortness of breath  Cardiac: Regular rhythm/rate  Pain/Nausea/PRN: Ongoing mild abdominal pain, no PRNs needed.  Lidocaine patch in place.  Ongoing baseline nausea, well-managed  Diet: NPO except sips of water.  Continuous tube feed at goal rate of 45 mL/hour.  Tolerating  well.  LDA: RUE PIV removed, JT, GT  Infusion(s): ertapenem daily prior to discharge  GI/: WDL  Skin: GT and JT, otherwise intact  Mobility: Independent  Events/Education: Reviewed AVS, removed PIV.  Accompanied patient to discharge pharmacy upon discharge  Plan: scheduled follow-up appointments.

## 2021-12-07 NOTE — PLAN OF CARE
"  /80 (BP Location: Right arm)   Pulse 90   Temp 97.7  F (36.5  C) (Oral)   Resp 16   Wt 60.4 kg (133 lb 1.6 oz)   SpO2 96%   BMI 20.85 kg/m      1017-2261  VSS on RA, no s/s of distress. A/Ox4, pleasant and cooperative with cares. Endorsed abdominal and lower back discomfort; heating pad and ice packs utilized with some relief; pt also given PO oxy x1 and IV dilaudid x1 with adequate relief. Pt endorsing intermittent nausea--good relief with IV benadryl and IV compazine. RUQ Gtube patent and open to gravity--scant output. L Jtube patent with continuous TF @10ml/hr (started @1900) and to be advanced by 10ml q6hrs to goal rate of 45ml/hr; w/q4hr 60ml flushes; relizorb cartridge needs to be changed @0700--once rate >20ml/hr. NPO except for meds. AC/HS BGs--BGs in 70s w/each check this evening and prn D50 given x2 with last BG recheck 120. PIV SL--Pt refusing IV fluids on day shift, stating she \"felt puffy\"--oncall surgery MD Brock made aware (of MIV refusal, BGs); no new orders. R Jtube dressing changed; slightly leaky at site and reddened/painful to touch--cavilon barrior prep applied with some relief. UAL in room; voiding not saving; reporting a few BMs this evening so bowel meds held. Pt talking to family on phone through shift or resting quietly; sleeping now; call light and belongings within reach. Will continue to monitor and continue POC/notify team as needed.   "

## 2021-12-07 NOTE — PHARMACY-CONSULT NOTE
Pharmacy Tube Feeding Consult    Medication reviewed for administration by feeding tube and for potential food/drug interactions.    Recommendation: No changes are needed at this time. Per RN, patient is able to swallow pills. Do not crush pantoprazole EC tab.      Pharmacy will continue to follow as new medications are ordered.    Laina Peters, PharmD

## 2021-12-08 ENCOUNTER — HOME INFUSION (PRE-WILLOW HOME INFUSION) (OUTPATIENT)
Dept: PHARMACY | Facility: CLINIC | Age: 56
End: 2021-12-08
Payer: COMMERCIAL

## 2021-12-08 ENCOUNTER — PATIENT OUTREACH (OUTPATIENT)
Dept: SURGERY | Facility: CLINIC | Age: 56
End: 2021-12-08
Payer: COMMERCIAL

## 2021-12-08 NOTE — PROGRESS NOTES
RN Post-Op/Post-Discharge Care Coordination Note    Ms. Dinora Mcghee is a 56 year old female who was hospitalized with abdominal pain and j-tube problems.  She was recently discharged from the hospital.  Spoke with Patient.    Support  Patient able to care for self independently   Resumption of home care was requested prior to discharge.     Health Status  Nausea/Vomiting: Patient reports feeling nauseated. A scopolamine patch was placed yesterday and is somewhat helpful.  The patient states that her pharmacy will not refill Compazine even though she has one refill remaining.  She has sent a [employer]ent message to the GI Team.   Eating/drinking: Tolerating continuous TF at 45ml/hr.  Will slowly start PO. Recommended Greek Yogurt daily  Bowel habits: Patient reports having loose stool. She is unsure if it is related to TF or antibiotics.  She will call with concerns.  Tube: Venting with G-tube PRN. J-tube patent with minimal drainage.  Skin looks and feels better.  She is keeping the area clean/dry. Applying Lidocaine 4% and allowing to dry then placing skin barrier and a dressing.  Changing PRN.  Fevers/chills: Patient denies any fever or chills.  Incisions:  No surgery . IR site C/D/I  Pain: Improving.  She medicated with Oxycodone and Robaxin at 8 pm last night and nothing today.  States more discomfort when sedentary.  New Medications:  Levaquin, Oxycodone, Robaxin, Lidocaine    Activity/Restrictions  No restrictions    Equipment  None    Pathology reviewed with patient:  N/A    Forms/Letters  No    All of her questions were answered.  She will call this office if she has any further questions and/or concerns.      Patient has a follow up appointment arranged with Dr. Park on 12/20/21 (video).    Whom and When to Call  Patient acknowledges understanding of how to manage any medication changes and   when to seek medical care.     Patient advised that if after hour medical concerns arise to please call  [phone] and choose option 4 to speak to the physician on call.

## 2021-12-09 LAB
BACTERIA PRT CULT: ABNORMAL

## 2021-12-13 ENCOUNTER — PATIENT OUTREACH (OUTPATIENT)
Dept: GASTROENTEROLOGY | Facility: CLINIC | Age: 56
End: 2021-12-13
Payer: COMMERCIAL

## 2021-12-13 DIAGNOSIS — R11.0 NAUSEA: Primary | ICD-10-CM

## 2021-12-13 RX ORDER — PROCHLORPERAZINE MALEATE 10 MG
5 TABLET ORAL EVERY 6 HOURS PRN
Qty: 60 TABLET | Refills: 1 | Status: SHIPPED | OUTPATIENT
Start: 2021-12-13 | End: 2022-02-10

## 2021-12-14 NOTE — DISCHARGE SUMMARY
Coding Query Clarification    For full details regarding this patient's hospital course and discharge, please refer to Discharge Summary filed on 12/6/21.     Admission Diagnoses:  Intra-abdominal abscess (H) [K65.1]  Postprocedural intraabdominal abscess [T81.43XA]  Moderate malnutrition in the context of acute on chronic illness        Per Registered Dietician evaluation 12/6/21:    MALNUTRITION  % Intake: Decreased intake does not meet criteria  % Weight Loss: > 2% in 1 week (severe)  Subcutaneous Fat Loss: Facial region:  mild  Muscle Loss: Temporal:  mild and Thoracic region (clavicle, acromium bone, deltoid, trapezius, pectoral):  mild  Fluid Accumulation/Edema: None noted  Malnutrition Diagnosis: Moderate malnutrition in the context of acute on chronic illness        Sweta Gautam MD  General Surgery PGY-1

## 2021-12-20 ENCOUNTER — VIRTUAL VISIT (OUTPATIENT)
Dept: GASTROENTEROLOGY | Facility: CLINIC | Age: 56
End: 2021-12-20
Payer: COMMERCIAL

## 2021-12-20 DIAGNOSIS — K65.1 INTRA-ABDOMINAL ABSCESS (H): Primary | ICD-10-CM

## 2021-12-20 PROCEDURE — 99215 OFFICE O/P EST HI 40 MIN: CPT | Mod: 95 | Performed by: INTERNAL MEDICINE

## 2021-12-20 NOTE — LETTER
12/20/2021         RE: Dinora Mcghee  [address] [facility] [address]        Dear Colleague,    Thank you for referring your patient, Dinora Mcghee, to the [facility] [place]. Please see a copy of my visit note below.    Patient very well-known to us post TPI AT with severe gastroparesis and placement of a direct J-tube proximally 1 month ago. She was readmitted with a abscess around the tube insertion site but well-positioned tube. She was treated with antibiotics and a CT-guided aspiration of a few cc of remnant purulent material and has been home since. Since then she has been having adequate tube feedings with 45 cc an hour but having to go 24 h a day. She is venting her G and having significant drainage around the J-tube site with burning pain. She has been doing all local care as instructed by our stoma nurse.  We spent 50 min on a video conference exploring all the treatment options for gastroparesis. The most immediate that I suggested was acupuncture which is actually been used extensively in [place] and we had some good results with GI oriented acupuncture as its been shown to stimulate migrating motor complexes and in the past I have had patients respond fairly well. Ultimately we talked about the necessity of her getting an I&D to prescribe domperidone. We discussed that in the past I had a number of patients with good response and with some patients prescribed elsewhere until recently but that its black box for prescription even to [facility] at present. We'll set the wheels in motion with Dr. Melissa in our luminal GI section to encourage application for an I&D for that. We also discussed that G problem is not an ideal choice for her and she has had adverse reaction and/or complications to almost every intervention done recently and is frankly tired of the. More urgently though we need to convert her J-tube to a low-profile as there seems to be significant  traction or perhaps even a buried bumper syndrome as she can feel the bumper under the skin and it was not near the surface at the last CT scan. Also need to repeat a CT scan to rule out abscess. Will schedule replacement of her G-tube low-profile with a longer probe profile tube has its too tight currently. Also replace her J-tube under general anesthesia with fluoroscopy and will ask for backup for assistance from Dr. Asya lang or just have him do the procedure.    1) CT scan at [facility] - follow up abcess around J tube   2) Start acupuncture w local [facility] provider - no referral needed  3) Schedule G and J tube revision any day Dec Wed 29 or after - Dr Xavier Montoya available to assist.       Mandeep Park MD

## 2021-12-20 NOTE — PROGRESS NOTES
Dinora is a 56 year old who is being evaluated via a billable video visit.      How would you like to obtain your AVS? MyChart  If the video visit is dropped, the invitation should be resent by: Send to e-mail at: [email or web address]  Will anyone else be joining your video visit? No    You were on a call for 50 minutes 08 seconds         Video-Visit Details    Type of service:  Video Visit    Video End Time:5:17 PM    Originating Location (pt. Location): Home    Distant Location (provider location):  [facility] [place]     Platform used for Video Visit: Emeli     Patient very well-known to us post TPI AT with severe gastroparesis and placement of a direct J-tube proximally 1 month ago. She was readmitted with a abscess around the tube insertion site but well-positioned tube. She was treated with antibiotics and a CT-guided aspiration of a few cc of remnant purulent material and has been home since. Since then she has been having adequate tube feedings with 45 cc an hour but having to go 24 h a day. She is venting her G and having significant drainage around the J-tube site with burning pain. She has been doing all local care as instructed by our stoma nurse.  We spent 50 min on a video conference exploring all the treatment options for gastroparesis. The most immediate that I suggested was acupuncture which is actually been used extensively in [place] and we had some good results with GI oriented acupuncture as its been shown to stimulate migrating motor complexes and in the past I have had patients respond fairly well. Ultimately we talked about the necessity of her getting an I&D to prescribe domperidone. We discussed that in the past I had a number of patients with good response and with some patients prescribed elsewhere until recently but that its black box for prescription even to [place] at present. We'll set the wheels in motion with Dr. Melissa in our luminal GI section  to encourage application for an I&D for that. We also discussed that G problem is not an ideal choice for her and she has had adverse reaction and/or complications to almost every intervention done recently and is frankly tired of the. More urgently though we need to convert her J-tube to a low-profile as there seems to be significant traction or perhaps even a buried bumper syndrome as she can feel the bumper under the skin and it was not near the surface at the last CT scan. Also need to repeat a CT scan to rule out abscess. Will schedule replacement of her G-tube low-profile with a longer probe profile tube has its too tight currently. Also replace her J-tube under general anesthesia with fluoroscopy and will ask for backup for assistance from Dr. Asya lang or just have him do the procedure.    1) CT scan at [facility] - follow up abcess around J tube   2) Start acupuncture w local [facility] provider - no referral needed  3) Schedule G and J tube revision any day Dec Wed 29 or after - Dr Park w Dr Montoya available to assist.

## 2021-12-21 ENCOUNTER — TELEPHONE (OUTPATIENT)
Dept: GASTROENTEROLOGY | Facility: CLINIC | Age: 56
End: 2021-12-21
Payer: COMMERCIAL

## 2021-12-21 NOTE — TELEPHONE ENCOUNTER
Called to discuss scheduling for G and J tube revision with Dr. Park and Dr. Montoya assistance. Explained they can expect a call for the time when closer to procedure date, will need a , someone to stay with them for 24 hours and should stay in town for 24 hours (within 45 min of Hospital) post procedure     Patient needs to get pre-op physical completed and will fax a copy to us along with bringing a hard copy with them. Fax number given. [phone] *If you do not get a preop physical, your procedure could be cancelled, patient voiced understanding*     Preop Plan: Pt will schedule with her PCP    Med Review     Blood thinner - No  ASA - No  Diabetic - Per Pt, she monitors her blood sugar level, but does not take any medicationss     COVID test discussed: Pt knows to schedule test within 4 days.    Patient Education r/t procedure:     Does patient have any history of gastric bypass/gastric surgery/altered panc/bili anatomy?     A pre-op nurse will call 1-2 days prior to the procedure. Is advised to be NPO/no solid food 8 hours before the procedure. Ok to drink clear liquids (Water, Apple Juice or Gatorade) up to 2 hours prior to procedure.     Other specific details/comments:     Verbalized understanding of all instructions. All questions answered.       Mansoor Dhillon LPN

## 2021-12-21 NOTE — PATIENT INSTRUCTIONS
Follow up:    Dr. Park has outlined the following steps after your recent clinic visit:    1) Schedule for CT scan at the [facility]. Please call the Radiology scheduling line to schedule an appointment at [phone]    2) Start acupuncture w local [facility] provider    3) Schedule G and J tube revision any day Dec Wed 29 or after - Dr Park w Dr Montoya available to assist.       Please call with any questions or concerns regarding your clinic visit today.    It is a pleasure being involved in your health care.    Contacts post-consultation depending on your need:    Schedule Clinic Appointments            [phone] # 1   M-F 7:30 - 5 pm    Kenya Victor RN Care Coordinator  [phone]    Mansoor Dhillon LPN    [phone]     OR Procedure Scheduling                             [phone]    My Chart is available 24 hours a day and is a secure way to access your records and communicate with your care team.  I strongly recommend signing up if you haven't already done so, if you are comfortable with computers.  If you would like to inquire about this or are having problems with My Chart access, you may call [phone] or go online at [email or web address].  Please allow at least 24 hours for a response and extra time on weekends and Holidays.

## 2021-12-22 ENCOUNTER — ANCILLARY PROCEDURE (OUTPATIENT)
Dept: CT IMAGING | Facility: CLINIC | Age: 56
End: 2021-12-22
Attending: INTERNAL MEDICINE
Payer: COMMERCIAL

## 2021-12-22 ENCOUNTER — PREP FOR PROCEDURE (OUTPATIENT)
Dept: GASTROENTEROLOGY | Facility: CLINIC | Age: 56
End: 2021-12-22

## 2021-12-22 ENCOUNTER — TELEPHONE (OUTPATIENT)
Dept: GASTROENTEROLOGY | Facility: CLINIC | Age: 56
End: 2021-12-22

## 2021-12-22 DIAGNOSIS — Z11.59 ENCOUNTER FOR SCREENING FOR OTHER VIRAL DISEASES: ICD-10-CM

## 2021-12-22 DIAGNOSIS — K65.1 INTRA-ABDOMINAL ABSCESS (H): ICD-10-CM

## 2021-12-22 PROCEDURE — 74177 CT ABD & PELVIS W/CONTRAST: CPT | Performed by: RADIOLOGY

## 2021-12-22 RX ORDER — IOPAMIDOL 755 MG/ML
80 INJECTION, SOLUTION INTRAVASCULAR ONCE
Status: COMPLETED | OUTPATIENT
Start: 2021-12-22 | End: 2021-12-22

## 2021-12-22 RX ADMIN — IOPAMIDOL 80 ML: 755 INJECTION, SOLUTION INTRAVASCULAR at 15:38

## 2021-12-22 NOTE — TELEPHONE ENCOUNTER
[facility] Call Center    Phone Message    May a detailed message be left on voicemail: yes     Reason for Call: Other:     Dinora is calling to see if she should  arrive early today for a a pre-op physical.  She said it was her understanding that this could be a possiblity but has not heard back.  Pl;ease call to advise.      Action Taken: Message routed to:  [facility] ([facility]): akin mccormick    Travel Screening: Not Applicable

## 2021-12-22 NOTE — TELEPHONE ENCOUNTER
"Returned call to patient, advised that she will not need preop, we can update H&P from most recent admit. Getting CT done this afternoon. Requesting oxycodone, \"dilaudid is too much and gives me  Headache,not using much, need it for night time\" Message sent to Dr. Park    ML  "

## 2021-12-23 DIAGNOSIS — R23.8 SKIN IRRITATION: Primary | ICD-10-CM

## 2021-12-23 RX ORDER — HYDROCOLLOID DRESSING 4"X4 3/4"
BANDAGE TOPICAL
Qty: 2 EACH | Refills: 11 | Status: SHIPPED | OUTPATIENT
Start: 2021-12-23 | End: 2023-04-25

## 2021-12-29 ENCOUNTER — APPOINTMENT (OUTPATIENT)
Dept: GENERAL RADIOLOGY | Facility: CLINIC | Age: 56
End: 2021-12-29
Attending: INTERNAL MEDICINE
Payer: COMMERCIAL

## 2021-12-29 ENCOUNTER — ANESTHESIA (OUTPATIENT)
Dept: SURGERY | Facility: CLINIC | Age: 56
End: 2021-12-29
Payer: COMMERCIAL

## 2021-12-29 ENCOUNTER — HOSPITAL ENCOUNTER (OUTPATIENT)
Facility: CLINIC | Age: 56
Discharge: HOME | End: 2021-12-29
Attending: INTERNAL MEDICINE | Admitting: INTERNAL MEDICINE
Payer: COMMERCIAL

## 2021-12-29 ENCOUNTER — ANESTHESIA EVENT (OUTPATIENT)
Dept: SURGERY | Facility: CLINIC | Age: 56
End: 2021-12-29
Payer: COMMERCIAL

## 2021-12-29 VITALS
HEART RATE: 80 BPM | OXYGEN SATURATION: 95 % | TEMPERATURE: 97.5 F | BODY MASS INDEX: 20.11 KG/M2 | DIASTOLIC BLOOD PRESSURE: 64 MMHG | RESPIRATION RATE: 16 BRPM | HEIGHT: 68 IN | WEIGHT: 132.72 LBS | SYSTOLIC BLOOD PRESSURE: 94 MMHG

## 2021-12-29 DIAGNOSIS — K65.1 POSTPROCEDURAL INTRAABDOMINAL ABSCESS (H): Primary | ICD-10-CM

## 2021-12-29 DIAGNOSIS — T81.43XA POSTPROCEDURAL INTRAABDOMINAL ABSCESS (H): Primary | ICD-10-CM

## 2021-12-29 DIAGNOSIS — K94.23 GASTROSTOMY TUBE OBSTRUCTION (H): ICD-10-CM

## 2021-12-29 LAB
GLUCOSE BLDC GLUCOMTR-MCNC: 78 MG/DL (ref 70–99)
UPPER GI ENDOSCOPY: NORMAL

## 2021-12-29 PROCEDURE — 250N000009 HC RX 250: Performed by: INTERNAL MEDICINE

## 2021-12-29 PROCEDURE — 999N000181 XR SURGERY CARM FLUORO GREATER THAN 5 MIN W STILLS: Mod: TC

## 2021-12-29 PROCEDURE — C1769 GUIDE WIRE: HCPCS | Performed by: INTERNAL MEDICINE

## 2021-12-29 PROCEDURE — 250N000009 HC RX 250: Performed by: NURSE ANESTHETIST, CERTIFIED REGISTERED

## 2021-12-29 PROCEDURE — 272N000001 HC OR GENERAL SUPPLY STERILE: Performed by: INTERNAL MEDICINE

## 2021-12-29 PROCEDURE — 250N000011 HC RX IP 250 OP 636: Performed by: NURSE ANESTHETIST, CERTIFIED REGISTERED

## 2021-12-29 PROCEDURE — 360N000075 HC SURGERY LEVEL 2, PER MIN: Performed by: INTERNAL MEDICINE

## 2021-12-29 PROCEDURE — 710N000010 HC RECOVERY PHASE 1, LEVEL 2, PER MIN: Performed by: INTERNAL MEDICINE

## 2021-12-29 PROCEDURE — 999N000141 HC STATISTIC PRE-PROCEDURE NURSING ASSESSMENT: Performed by: INTERNAL MEDICINE

## 2021-12-29 PROCEDURE — 258N000003 HC RX IP 258 OP 636: Performed by: NURSE ANESTHETIST, CERTIFIED REGISTERED

## 2021-12-29 PROCEDURE — 272N000002 HC OR SUPPLY OTHER OPNP: Performed by: INTERNAL MEDICINE

## 2021-12-29 PROCEDURE — 370N000017 HC ANESTHESIA TECHNICAL FEE, PER MIN: Performed by: INTERNAL MEDICINE

## 2021-12-29 PROCEDURE — 250N000011 HC RX IP 250 OP 636: Performed by: STUDENT IN AN ORGANIZED HEALTH CARE EDUCATION/TRAINING PROGRAM

## 2021-12-29 PROCEDURE — 250N000011 HC RX IP 250 OP 636: Performed by: ANESTHESIOLOGY

## 2021-12-29 PROCEDURE — 82962 GLUCOSE BLOOD TEST: CPT

## 2021-12-29 PROCEDURE — 710N000012 HC RECOVERY PHASE 2, PER MINUTE: Performed by: INTERNAL MEDICINE

## 2021-12-29 RX ORDER — METHOCARBAMOL 750 MG/1
TABLET, FILM COATED ORAL
COMMUNITY
Start: 2021-12-07 | End: 2022-02-17

## 2021-12-29 RX ORDER — PROPOFOL 10 MG/ML
INJECTION, EMULSION INTRAVENOUS PRN
Status: DISCONTINUED | OUTPATIENT
Start: 2021-12-29 | End: 2021-12-29

## 2021-12-29 RX ORDER — ONDANSETRON 4 MG/1
4 TABLET, ORALLY DISINTEGRATING ORAL EVERY 30 MIN PRN
Status: DISCONTINUED | OUTPATIENT
Start: 2021-12-29 | End: 2021-12-29 | Stop reason: HOSPADM

## 2021-12-29 RX ORDER — SODIUM CHLORIDE, SODIUM LACTATE, POTASSIUM CHLORIDE, CALCIUM CHLORIDE 600; 310; 30; 20 MG/100ML; MG/100ML; MG/100ML; MG/100ML
INJECTION, SOLUTION INTRAVENOUS CONTINUOUS
Status: DISCONTINUED | OUTPATIENT
Start: 2021-12-29 | End: 2021-12-29 | Stop reason: HOSPADM

## 2021-12-29 RX ORDER — FENTANYL CITRATE 50 UG/ML
25 INJECTION, SOLUTION INTRAMUSCULAR; INTRAVENOUS
Status: DISCONTINUED | OUTPATIENT
Start: 2021-12-29 | End: 2021-12-29 | Stop reason: HOSPADM

## 2021-12-29 RX ORDER — PROPOFOL 10 MG/ML
INJECTION, EMULSION INTRAVENOUS CONTINUOUS PRN
Status: DISCONTINUED | OUTPATIENT
Start: 2021-12-29 | End: 2021-12-29

## 2021-12-29 RX ORDER — ONDANSETRON 2 MG/ML
4 INJECTION INTRAMUSCULAR; INTRAVENOUS EVERY 30 MIN PRN
Status: DISCONTINUED | OUTPATIENT
Start: 2021-12-29 | End: 2021-12-29 | Stop reason: HOSPADM

## 2021-12-29 RX ORDER — HYDROMORPHONE HCL IN WATER/PF 6 MG/30 ML
0.2 PATIENT CONTROLLED ANALGESIA SYRINGE INTRAVENOUS EVERY 5 MIN PRN
Status: DISCONTINUED | OUTPATIENT
Start: 2021-12-29 | End: 2021-12-29 | Stop reason: HOSPADM

## 2021-12-29 RX ORDER — FENTANYL CITRATE 50 UG/ML
25 INJECTION, SOLUTION INTRAMUSCULAR; INTRAVENOUS EVERY 5 MIN PRN
Status: DISCONTINUED | OUTPATIENT
Start: 2021-12-29 | End: 2021-12-29 | Stop reason: HOSPADM

## 2021-12-29 RX ORDER — ONDANSETRON 2 MG/ML
4 INJECTION INTRAMUSCULAR; INTRAVENOUS
Status: DISCONTINUED | OUTPATIENT
Start: 2021-12-29 | End: 2021-12-29 | Stop reason: HOSPADM

## 2021-12-29 RX ORDER — NALOXONE HYDROCHLORIDE 0.4 MG/ML
0.2 INJECTION, SOLUTION INTRAMUSCULAR; INTRAVENOUS; SUBCUTANEOUS
Status: DISCONTINUED | OUTPATIENT
Start: 2021-12-29 | End: 2021-12-29 | Stop reason: HOSPADM

## 2021-12-29 RX ORDER — LIDOCAINE HYDROCHLORIDE 20 MG/ML
INJECTION, SOLUTION INFILTRATION; PERINEURAL PRN
Status: DISCONTINUED | OUTPATIENT
Start: 2021-12-29 | End: 2021-12-29

## 2021-12-29 RX ORDER — SODIUM CHLORIDE, SODIUM LACTATE, POTASSIUM CHLORIDE, CALCIUM CHLORIDE 600; 310; 30; 20 MG/100ML; MG/100ML; MG/100ML; MG/100ML
INJECTION, SOLUTION INTRAVENOUS CONTINUOUS PRN
Status: DISCONTINUED | OUTPATIENT
Start: 2021-12-29 | End: 2021-12-29

## 2021-12-29 RX ORDER — NALOXONE HYDROCHLORIDE 0.4 MG/ML
0.4 INJECTION, SOLUTION INTRAMUSCULAR; INTRAVENOUS; SUBCUTANEOUS
Status: DISCONTINUED | OUTPATIENT
Start: 2021-12-29 | End: 2021-12-29 | Stop reason: HOSPADM

## 2021-12-29 RX ORDER — MEPERIDINE HYDROCHLORIDE 25 MG/ML
12.5 INJECTION INTRAMUSCULAR; INTRAVENOUS; SUBCUTANEOUS
Status: DISCONTINUED | OUTPATIENT
Start: 2021-12-29 | End: 2021-12-29 | Stop reason: HOSPADM

## 2021-12-29 RX ORDER — ONDANSETRON 2 MG/ML
4 INJECTION INTRAMUSCULAR; INTRAVENOUS EVERY 6 HOURS PRN
Status: DISCONTINUED | OUTPATIENT
Start: 2021-12-29 | End: 2021-12-29 | Stop reason: HOSPADM

## 2021-12-29 RX ORDER — ONDANSETRON 2 MG/ML
INJECTION INTRAMUSCULAR; INTRAVENOUS PRN
Status: DISCONTINUED | OUTPATIENT
Start: 2021-12-29 | End: 2021-12-29

## 2021-12-29 RX ORDER — PROCHLORPERAZINE MALEATE 5 MG
10 TABLET ORAL EVERY 6 HOURS PRN
Status: DISCONTINUED | OUTPATIENT
Start: 2021-12-29 | End: 2021-12-29 | Stop reason: HOSPADM

## 2021-12-29 RX ORDER — OXYCODONE AND ACETAMINOPHEN 5; 325 MG/1; MG/1
1 TABLET ORAL EVERY 6 HOURS PRN
Qty: 12 TABLET | Refills: 0 | Status: SHIPPED | OUTPATIENT
Start: 2021-12-29 | End: 2022-01-01

## 2021-12-29 RX ORDER — ONDANSETRON 4 MG/1
4 TABLET, ORALLY DISINTEGRATING ORAL EVERY 6 HOURS PRN
Status: DISCONTINUED | OUTPATIENT
Start: 2021-12-29 | End: 2021-12-29 | Stop reason: HOSPADM

## 2021-12-29 RX ORDER — LIDOCAINE 40 MG/G
CREAM TOPICAL
Status: DISCONTINUED | OUTPATIENT
Start: 2021-12-29 | End: 2021-12-29 | Stop reason: HOSPADM

## 2021-12-29 RX ORDER — FLUMAZENIL 0.1 MG/ML
0.2 INJECTION, SOLUTION INTRAVENOUS
Status: DISCONTINUED | OUTPATIENT
Start: 2021-12-29 | End: 2021-12-29 | Stop reason: HOSPADM

## 2021-12-29 RX ORDER — OXYCODONE HYDROCHLORIDE 5 MG/1
5 TABLET ORAL EVERY 4 HOURS PRN
Status: DISCONTINUED | OUTPATIENT
Start: 2021-12-29 | End: 2021-12-29 | Stop reason: HOSPADM

## 2021-12-29 RX ORDER — FENTANYL CITRATE 50 UG/ML
INJECTION, SOLUTION INTRAMUSCULAR; INTRAVENOUS PRN
Status: DISCONTINUED | OUTPATIENT
Start: 2021-12-29 | End: 2021-12-29

## 2021-12-29 RX ORDER — DIPHENHYDRAMINE HYDROCHLORIDE 50 MG/ML
25 INJECTION INTRAMUSCULAR; INTRAVENOUS ONCE
Status: COMPLETED | OUTPATIENT
Start: 2021-12-29 | End: 2021-12-29

## 2021-12-29 RX ADMIN — PHENYLEPHRINE HYDROCHLORIDE 100 MCG: 10 INJECTION INTRAVENOUS at 10:31

## 2021-12-29 RX ADMIN — HYDROMORPHONE HYDROCHLORIDE 0.2 MG: 0.2 INJECTION, SOLUTION INTRAMUSCULAR; INTRAVENOUS; SUBCUTANEOUS at 12:17

## 2021-12-29 RX ADMIN — HYDROMORPHONE HYDROCHLORIDE 0.2 MG: 0.2 INJECTION, SOLUTION INTRAMUSCULAR; INTRAVENOUS; SUBCUTANEOUS at 11:45

## 2021-12-29 RX ADMIN — ONDANSETRON 4 MG: 2 INJECTION INTRAMUSCULAR; INTRAVENOUS at 11:33

## 2021-12-29 RX ADMIN — ONDANSETRON 4 MG: 2 INJECTION INTRAMUSCULAR; INTRAVENOUS at 10:31

## 2021-12-29 RX ADMIN — SUGAMMADEX 200 MG: 100 INJECTION, SOLUTION INTRAVENOUS at 11:12

## 2021-12-29 RX ADMIN — MIDAZOLAM 2 MG: 1 INJECTION INTRAMUSCULAR; INTRAVENOUS at 10:15

## 2021-12-29 RX ADMIN — PROCHLORPERAZINE EDISYLATE 10 MG: 5 INJECTION INTRAMUSCULAR; INTRAVENOUS at 11:46

## 2021-12-29 RX ADMIN — DIPHENHYDRAMINE HYDROCHLORIDE 25 MG: 50 INJECTION, SOLUTION INTRAMUSCULAR; INTRAVENOUS at 11:48

## 2021-12-29 RX ADMIN — ROCURONIUM BROMIDE 50 MG: 50 INJECTION, SOLUTION INTRAVENOUS at 10:20

## 2021-12-29 RX ADMIN — PROPOFOL 150 MCG/KG/MIN: 10 INJECTION, EMULSION INTRAVENOUS at 10:23

## 2021-12-29 RX ADMIN — SODIUM CHLORIDE, POTASSIUM CHLORIDE, SODIUM LACTATE AND CALCIUM CHLORIDE: 600; 310; 30; 20 INJECTION, SOLUTION INTRAVENOUS at 10:13

## 2021-12-29 RX ADMIN — LIDOCAINE HYDROCHLORIDE 100 MG: 20 INJECTION, SOLUTION INFILTRATION; PERINEURAL at 10:20

## 2021-12-29 RX ADMIN — FENTANYL CITRATE 100 MCG: 50 INJECTION, SOLUTION INTRAMUSCULAR; INTRAVENOUS at 10:20

## 2021-12-29 RX ADMIN — PROPOFOL 100 MG: 10 INJECTION, EMULSION INTRAVENOUS at 10:20

## 2021-12-29 ASSESSMENT — LIFESTYLE VARIABLES: TOBACCO_USE: 1

## 2021-12-29 ASSESSMENT — MIFFLIN-ST. JEOR: SCORE: 1245.5

## 2021-12-29 ASSESSMENT — ENCOUNTER SYMPTOMS: SEIZURES: 0

## 2021-12-29 NOTE — ANESTHESIA POSTPROCEDURE EVALUATION
Patient: Dinora Mcghee    Procedure: Procedure(s):  REPLACEMENT, JEJUNOSTOMY TUBE, PERCUTANEOUS       Diagnosis:Encounter for care related to feeding tube [Z46.59]  Diagnosis Additional Information: No value filed.    Anesthesia Type:  General    Note:  Disposition: Outpatient   Postop Pain Control: Uneventful            Sign Out: Well controlled pain   PONV: No   Neuro/Psych: Uneventful            Sign Out: Acceptable/Baseline neuro status   Airway/Respiratory: Uneventful            Sign Out: Acceptable/Baseline resp. status   CV/Hemodynamics: Uneventful            Sign Out: Acceptable CV status; No obvious hypovolemia; No obvious fluid overload   Other NRE: NONE   DID A NON-ROUTINE EVENT OCCUR? No           Last vitals:  Vitals Value Taken Time   BP 93/56 12/29/21 1250   Temp 36.9  C (98.4  F) 12/29/21 1230   Pulse 96 12/29/21 1253   Resp 12 12/29/21 1245   SpO2 96 % 12/29/21 1253   Vitals shown include unvalidated device data.    Electronically Signed By: Moo Hart MD  December 29, 2021  12:54 PM

## 2021-12-29 NOTE — BRIEF OP NOTE
[facility] Brief Operative Note    Pre-operative diagnosis: Encounter for care related to feeding tube [Z46.59]   Post-operative diagnosis As prior   Procedure: Procedure(s):  REPLACEMENT, JEJUNOSTOMY TUBE, PERCUTANEOUS   Surgeon(s): Surgeon(s) and Role:     * Mandeep Park MD - Primary     * Judit Paz MD   Estimated blood loss: * No values recorded between 12/29/2021 10:40 AM and 12/29/2021 11:23 AM *    Specimens: * No specimens in log *   Findings:      -  films demonstrating well positioned PEG and direct J tubes.   - Uncomplicated exchange of the existing malfunctioning G-tube with an 18F 3.0cm low profile Teresa gastrostomy tube.  - As previous, there was evidence of a widely patent duodenojejunostomy with healthy and widely patenet jejunojejunostomy.   - Identification of the biliary limb to allow replacement of the jejunostomy tube within the common feeding limb.   - Uncomplicated replacement of a 24F en-fit feeding tube.  - GASTROSTOMY IS RIGHT UPPER QUADRANT, JEJUNOSTOMY IS LEFT LOWER/MID QUADRANT    Plan  - General anesthesia recovery with home discharge when appropriate.   - Low profile gastrostomy tube may be used for decompression (gravity bag) without delay (if desired), resume feeding with jejunosotmy tube when ready.  - The findings and recommendations were discussed with the patient and their family.

## 2021-12-29 NOTE — ANESTHESIA CARE TRANSFER NOTE
Patient: Dinora Mcghee    Procedure: Procedure(s):  REPLACEMENT, JEJUNOSTOMY TUBE, PERCUTANEOUS       Diagnosis: Encounter for care related to feeding tube [Z46.59]  Diagnosis Additional Information: No value filed.    Anesthesia Type:   General     Note:    Oropharynx: oropharynx clear of all foreign objects  Level of Consciousness: awake      Independent Airway: airway patency satisfactory and stable  Dentition: dentition unchanged  Vital Signs Stable: post-procedure vital signs reviewed and stable  Report to RN Given: handoff report given  Patient transferred to: PACU    Handoff Report: Identifed the Patient, Identified the Reponsible Provider, Reviewed the pertinent medical history, Discussed the surgical course, Reviewed Intra-OP anesthesia mangement and issues during anesthesia, Set expectations for post-procedure period and Allowed opportunity for questions and acknowledgement of understanding      Vitals:  Vitals Value Taken Time   /70 12/29/21 1130   Temp 36.5 12/29/21 1130   Pulse 92 12/29/21 1130   Resp 12 12/29/21 1130   SpO2 99 12/29/21 1130   Vitals shown include unvalidated device data.    Electronically Signed By: NATALY Lama CRNA  December 29, 2021  11:32 AM

## 2021-12-29 NOTE — DISCHARGE INSTRUCTIONS
[facility]   Adult Discharge Orders & Instructions     For 24 hours after surgery    1. Get plenty of rest.  A responsible adult must stay with you for at least 24 hours after you leave the hospital.   2. Do not drive or use heavy equipment.  If you have weakness or tingling, don't drive or use heavy equipment until this feeling goes away.  3. Do not drink alcohol.  4. Avoid strenuous or risky activities.  Ask for help when climbing stairs.   5. You may feel lightheaded.  IF so, sit for a few minutes before standing.  Have someone help you get up.   6. If you have nausea (feel sick to your stomach): Drink only clear liquids such as apple juice, ginger ale, broth or 7-Up.  Rest may also help.  Be sure to drink enough fluids.  Move to a regular diet as you feel able.  7. You may have a slight fever. Call the doctor if your fever is over 100 F (37.7 C) (taken under the tongue) or lasts longer than 24 hours.  8. You may have a dry mouth, a sore throat, muscle aches or trouble sleeping.  These should go away after 24 hours.  9. Do not make important or legal decisions.   Call your doctor for any of the followin.  Signs of infection (fever, growing tenderness at the surgery site, a large amount of drainage or bleeding, severe pain, foul-smelling drainage, redness, swelling).    2. It has been over 8 to 10 hours since surgery and you are still not able to urinate (pass water).    3.  Headache for over 24 hours.    4.  Numbness, tingling or weakness the day after surgery (if you had spinal anesthesia).

## 2021-12-29 NOTE — ANESTHESIA PREPROCEDURE EVALUATION
Anesthesia Pre-Procedure Evaluation    Patient: Dinora Mcghee   MRN: [id number] : 1965        Preoperative Diagnosis: Encounter for care related to feeding tube [Z46.59]    Procedure : Procedure(s):  REPLACEMENT, JEJUNOSTOMY TUBE, PERCUTANEOUS          Past Medical History:   Diagnosis Date     Allergic rhinitis, cause unspecified      Allergy to other foods     strawberries, apples, celeries, alice, watermelon     Arthritis     left knee     Choledocholithiasis     long after cholecystectomy     Chronic abdominal pain      Chronic constipation      Chronic nausea      Chronic pancreatitis (H)      Degeneration of lumbar or lumbosacral intervertebral disc      Esophageal reflux     w/ hiatal hernia     Gastroparesis      Hiatal hernia      History of pituitary adenoma     s/p resection     Hypothyroidism      Migraines      Mild hyperlipidemia      On tube feeding diet     presence of GJ tube     Pancreatic disease     PD stricture, 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 endometriosis growth     APPENDECTOMY        SECTION       COLONOSCOPY       ENDOSCOPIC INSERTION TUBE GASTROSTOMY N/A 2021    Procedure: Gastrojejonostomy placement with jejunopexy, PEG tube exchange;  Surgeon: Zackery Montoya MD;  [facility]     ENDOSCOPIC RETROGRADE CHOLANGIOPANCREATOGRAM N/A 2015    Procedure: ENDOSCOPIC RETROGRADE CHOLANGIOPANCREATOGRAM;  Surgeon: Mandeep Park MD;  [facility]     ENDOSCOPIC RETROGRADE CHOLANGIOPANCREATOGRAM N/A 2016    Procedure: COMBINED ENDOSCOPIC RETROGRADE CHOLANGIOPANCREATOGRAPHY, PLACE TUBE/STENT;  Surgeon: Mandeep Park MD;  [facility]     ENDOSCOPIC RETROGRADE  CHOLANGIOPANCREATOGRAM N/A 3/17/2016    Procedure: COMBINED ENDOSCOPIC RETROGRADE CHOLANGIOPANCREATOGRAPHY, REMOVE FOREIGN BODY OR STENT/TUBE;  Surgeon: Mandeep Park MD;  [facility]     ENDOSCOPIC RETROGRADE CHOLANGIOPANCREATOGRAM N/A 8/2/2016    Procedure: COMBINED ENDOSCOPIC RETROGRADE CHOLANGIOPANCREATOGRAPHY, PLACE TUBE/STENT;  Surgeon: Mandeep Park MD;  [facility]     ENDOSCOPIC RETROGRADE CHOLANGIOPANCREATOGRAM N/A 8/26/2016    Procedure: COMBINED ENDOSCOPIC RETROGRADE CHOLANGIOPANCREATOGRAPHY, REMOVE FOREIGN BODY OR STENT/TUBE;  Surgeon: Mandeep Park MD;  [facility]     ENDOSCOPIC ULTRASOUND UPPER GASTROINTESTINAL TRACT (GI) N/A 10/3/2016    Procedure: ENDOSCOPIC ULTRASOUND, ESOPHAGOSCOPY / UPPER GASTROINTESTINAL TRACT (GI);  Surgeon: Guru Jose Rodas MD;  [facility]     ESOPHAGOSCOPY, GASTROSCOPY, DUODENOSCOPY (EGD), COMBINED N/A 6/24/2015    Procedure: COMBINED ESOPHAGOSCOPY, GASTROSCOPY, DUODENOSCOPY (EGD), REMOVE FOREIGN BODY;  Surgeon: Mandeep Park MD;  [facility]     ESOPHAGOSCOPY, GASTROSCOPY, DUODENOSCOPY (EGD), COMBINED N/A 10/25/2015    Procedure: COMBINED ESOPHAGOSCOPY, GASTROSCOPY, DUODENOSCOPY (EGD);  Surgeon: Sammy Amaro MD;  [facility]     ESOPHAGOSCOPY, GASTROSCOPY, DUODENOSCOPY (EGD), COMBINED N/A 10/25/2015    Procedure: COMBINED ESOPHAGOSCOPY, GASTROSCOPY, DUODENOSCOPY (EGD), BIOPSY SINGLE OR MULTIPLE;  Surgeon: Sammy Amaro MD;  [facility]     ESOPHAGOSCOPY, GASTROSCOPY, DUODENOSCOPY (EGD), DILATATION, COMBINED       EXCISE LESION TRUNK N/A 4/17/2017    Procedure: EXCISE LESION TRUNK;  Removal of Abdominal Foreign Body;  Surgeon: Nestor Phoenix MD;  [facility]     HC ESOPH/GAS REFLUX TEST W NASAL IMPED >1 HR N/A 11/19/2015    Procedure: ESOPHAGEAL IMPEDENCE FUNCTION TEST WITH 24 HOUR PH GREATER THAN 1 HOUR;  Surgeon: Thiago Apple MD;  [facility]     HC UGI  ENDOSCOPY DIAG W BIOPSY  08     HC UGI ENDOSCOPY DIAG W BIOPSY  12     HC UGI ENDOSCOPY W ESOPHAGEAL DILATION BALLOON <30MM  08     HC UGI ENDOSCOPY W EUS N/A 2015    Procedure: COMBINED ENDOSCOPIC ULTRASOUND, ESOPHAGOSCOPY, GASTROSCOPY, DUODENOSCOPY (EGD);  Surgeon: Wm Dueñas MD;  [facility]     HC WRIST ARTHROSCOP,RELEASE XVERS LIG Bilateral 08     INJECT TRANSVERSUS ABDOMINIS PLANE (TAP) BLOCK BILATERAL Left 2016    Procedure: INJECT TRANSVERSUS ABDOMINIS PLANE (TAP) BLOCK BILATERAL;  Surgeon: Dickson Corrigan MD;  [facility]     laparoscopic pineda       LAPAROSCOPIC HERNIORRHAPHY INCISIONAL N/A 2018    Procedure: LAPAROSCOPIC HERNIORRHAPHY INCISIONAL;  Laparoscopic Incisional Hernia Repair with Symbotex Mesh Implant;  Surgeon: Nestor Phoenix MD;  [facility]     LAPAROSCOPIC PANCREATECTOMY, TRANSPLANT AUTO ISLET CELL N/A 2016    Procedure: LAPAROSCOPIC PANCREATECTOMY, TRANSPLANT AUTO ISLET CELL;  Surgeon: Nestor Phoenix MD;  [facility]     REPLACE GASTROJEJUNOSTOMY TUBE, PERCUTANEOUS N/A 2021    Procedure: GASTROJEJUNOSTOMY TUBE PLACEMENT, PERCUTANEOUS, WITH GASTROPEXY;  Surgeon: Mandeep Park MD;  [facility]     REPLACE GASTROJEJUNOSTOMY TUBE, PERCUTANEOUS N/A 2021    Procedure: REPLACEMENT, GASTROJEJUNOSTOMY TUBE, PERCUTANEOUS;  Surgeon: Zackery Montoya MD;  [facility]     REPLACE JEJUNOSTOMY TUBE, PERCUTANEOUS N/A 9/10/2021    Procedure: UPPER ENDOSCOPY, REPLACEMENT OF PERCUTANEOUS GASTROJEJUNOSTOMY TUBE, T-TAG GASTROPEXY;  Surgeon: Zackery Montoya MD;  [facility]     transphenoidal pituitary resection       ZZC  DELIVERY ONLY       ZZC  DELIVERY ONLY      repeat c section with incidental cystotomy with repair     ZZC EXCIS PITUITARY,TRANSNASAL/SEPTAL      pituitary tumor removed for diabetes insipidus     ZZC TOTAL ABDOM HYSTERECTOMY      w/ bilateral  salpingoophorectomy       Allergies   Allergen Reactions     Apple Anaphylaxis     Corticosteroids Other (See Comments)     All oral, IV and injectable steroids are contraindicated (unless in life threatening situations) in Islet Auto transplant recipients. They can cause irreversible loss of islet cell function. Please contact patient's transplant care coordinator ADI Gaffney RN at [phone]/pager [phone] and/or endocrinologist prior to administration.       Depakote [Divalproex Sodium] Other (See Comments)     Chest pain     Zithromax [Azithromycin Dihydrate] Anaphylaxis     Noted in 08 ER     Bromfenac      Other reaction(s): Headache     Codeine      Other reaction(s): Hallucinations     Darvocet [Propoxyphene N-Apap] Itching     Hydromorphone Other (See Comments)     Loopy     Morphine Nausea and Vomiting and Rash     Nalbuphine Hcl Rash     RASH :nubaine     Zosyn [Piperacillin-Tazobactam In D5w] Rash     Possible allergy, did have a diffuse rash that seemed drug related but could have also been related to soap in the hospital.      Bactrim [Sulfamethoxazole W-Trimethoprim] Other (See Comments) and Nausea and Vomiting     Severely low liver function.     Compazine [Prochlorperazine] Other (See Comments)     Twitching. Takes Benedryl and is fine     Tape [Adhesive Tape] Blisters     Tramadol      Zofran [Ondansetron] Other (See Comments)     migraine     Flagyl [Metronidazole] Hives and Rash      Social History     Tobacco Use     Smoking status: Former Smoker     Packs/day: 0.50     Years: 6.00     Pack years: 3.00     Types: Cigarettes     Start date: 1985     Quit date: 1992     Years since quittin.0     Smokeless tobacco: Never Used     Tobacco comment: no 2nd hand   Substance Use Topics     Alcohol use: Not Currently     Alcohol/week: 3.0 - 6.0 standard drinks     Types: 1 - 2 Glasses of wine, 1 - 2 Cans of beer, 1 - 2 Shots of liquor per week     Comment: none since IGG      Wt  Readings from Last 1 Encounters:   12/29/21 60.2 kg (132 lb 11.5 oz)        Anesthesia Evaluation   Pt has had prior anesthetic. Type: General.    History of anesthetic complications  - PONV.  (responds well to scopolamine patch).    ROS/MED HX  ENT/Pulmonary: Comment: 3 pk yr hx, quit 1992    (+) allergic rhinitis, tobacco use, Past use,  (-) asthma and sleep apnea   Neurologic:     (+) migraines,  (-) no seizures and no CVA   Cardiovascular:     (+) -----Previous cardiac testing   Echo: Date: Results:    Stress Test: Date: Results:    ECG Reviewed: Date: 8/7/21 Results:  Sinus rhythm  Cannot rule out Anterior infarct , age undetermined  Abnormal ECG   Ventricular rate 66 bpm     Cath: Date: Results:      METS/Exercise Tolerance: 4 - Raking leaves, gardening Comment: Endorses SOB when carrying laundry upstairs. Denies CP. +Fatigue, less stamina 2/2 malnutrition     Hematologic:  - neg hematologic  ROS  (-) history of blood clots and history of blood transfusion   Musculoskeletal: Comment: lumbago, chrondromalacia, stiff shoulder  - neg musculoskeletal ROS     GI/Hepatic: Comment: Hx hepatic dome lesion/IgG4 pseudotumor and elevated LFT's of unclear etiology    Chronic pancreatitis    Severe gastroparesis    Malnutrition, wt loss  Hiatal hernia      (+) GERD, Asymptomatic on medication, hiatal hernia, cholecystitis/cholelithiasis,     Renal/Genitourinary:  - neg Renal ROS     Endo: Comment: post-pancreatectomy diabetes; pancreatic insufficiency.     (+) thyroid problem, hypothyroidism,  (-) Type II DM   Psychiatric/Substance Use:  - neg psychiatric ROS     Infectious Disease:  - neg infectious disease ROS     Malignancy:  - neg malignancy ROS (+) Malignancy (pituitary adenoma), History of Neuro.    Other:  - neg other ROS          Physical Exam    Airway  airway exam normal           Respiratory Devices and Support         Dental  no notable dental history         Cardiovascular   cardiovascular exam normal           Pulmonary   pulmonary exam normal                OUTSIDE LABS:  CBC:   Lab Results   Component Value Date    WBC 7.5 12/06/2021    WBC 8.1 12/04/2021    HGB 12.3 12/06/2021    HGB 11.6 (L) 12/04/2021    HCT 36.3 12/06/2021    HCT 35.3 12/04/2021     (H) 12/06/2021     12/04/2021     BMP:   Lab Results   Component Value Date     12/07/2021     12/04/2021    POTASSIUM 3.7 12/07/2021    POTASSIUM 3.8 12/06/2021    CHLORIDE 104 12/07/2021    CHLORIDE 103 12/04/2021    CO2 27 12/07/2021    CO2 29 12/04/2021    BUN 5 (L) 12/07/2021    BUN 8 12/04/2021    CR 0.61 12/07/2021    CR 0.43 (L) 12/04/2021    GLC 78 12/29/2021     (H) 12/07/2021     COAGS:   Lab Results   Component Value Date    PTT 29 01/07/2017    INR 1.05 12/04/2021    FIBR 225 12/28/2016     POC:   Lab Results   Component Value Date    BGM 85 08/24/2018    HCG Negative 12/19/2016     HEPATIC:   Lab Results   Component Value Date    ALBUMIN 3.0 (L) 12/03/2021    PROTTOTAL 7.1 12/03/2021    ALT 19 12/03/2021    AST 11 12/03/2021    ALKPHOS 104 12/03/2021    BILITOTAL 0.5 12/03/2021     OTHER:   Lab Results   Component Value Date    PH 7.49 (H) 12/28/2016    LACT 0.9 12/03/2021    A1C 5.2 11/27/2021    KASSI 8.8 12/07/2021    PHOS 3.5 12/07/2021    MAG 2.1 12/07/2021    LIPASE <10 (L) 12/03/2021    AMYLASE 45 01/23/2017    TSH 0.67 09/16/2021    T4 1.13 03/23/2018    CRP 90.0 (H) 12/29/2016    SED 10 09/16/2021       Anesthesia Plan    ASA Status:  3   NPO Status:  NPO Appropriate    Anesthesia Type: General.     - Airway: ETT   Induction: Propofol.   Maintenance: TIVA.        Consents    Anesthesia Plan(s) and associated risks, benefits, and realistic alternatives discussed. Questions answered and patient/representative(s) expressed understanding.    - Discussed:     - Discussed with:  Patient         Postoperative Care       PONV prophylaxis: Ondansetron (or other 5HT-3), Dexamethasone or Solumedrol, Scopolamine patch      Comments:                Adalgisa Black MD

## 2021-12-30 ENCOUNTER — MEDICAL CORRESPONDENCE (OUTPATIENT)
Dept: HEALTH INFORMATION MANAGEMENT | Facility: CLINIC | Age: 56
End: 2021-12-30
Payer: COMMERCIAL

## 2021-12-30 DIAGNOSIS — Z53.9 DIAGNOSIS NOT YET DEFINED: Primary | ICD-10-CM

## 2021-12-30 DIAGNOSIS — K86.89 PANCREATIC INSUFFICIENCY: ICD-10-CM

## 2022-01-03 ENCOUNTER — MYC MEDICAL ADVICE (OUTPATIENT)
Dept: GASTROENTEROLOGY | Facility: CLINIC | Age: 57
End: 2022-01-03
Payer: COMMERCIAL

## 2022-01-04 ENCOUNTER — HOME INFUSION (PRE-WILLOW HOME INFUSION) (OUTPATIENT)
Dept: PHARMACY | Facility: CLINIC | Age: 57
End: 2022-01-04
Payer: COMMERCIAL

## 2022-01-04 NOTE — TELEPHONE ENCOUNTER
Called patient regarding ongoing issues with tube. Main complaints pain at tube site and overall exhaustion, feels like her health is worse since we started with tube back in September. Pt says she doesn't do well with antibiotics. Feels like she might need IV antibiotics wants to talk about that and overall plan with tube.    Updated patient that message in to Dr. Park to discuss overall plan but he's attending currently and extremely busy. Will connect w/ Dr. Park tomorrow and perhaps add to clinic schedule for further discussion. Advised her to go to ER if pain intolerable, or if she develops fevers.    ML

## 2022-01-05 ENCOUNTER — VIRTUAL VISIT (OUTPATIENT)
Dept: GASTROENTEROLOGY | Facility: CLINIC | Age: 57
End: 2022-01-05
Payer: COMMERCIAL

## 2022-01-05 DIAGNOSIS — K31.84 GASTROPARESIS: Primary | ICD-10-CM

## 2022-01-05 DIAGNOSIS — R10.84 ABDOMINAL PAIN, GENERALIZED: ICD-10-CM

## 2022-01-05 DIAGNOSIS — R10.13 EPIGASTRIC PAIN: Primary | ICD-10-CM

## 2022-01-05 PROCEDURE — 99215 OFFICE O/P EST HI 40 MIN: CPT | Mod: 95 | Performed by: INTERNAL MEDICINE

## 2022-01-05 RX ORDER — OXYCODONE AND ACETAMINOPHEN 5; 325 MG/1; MG/1
1 TABLET ORAL EVERY 6 HOURS PRN
Qty: 30 TABLET | Refills: 0 | Status: SHIPPED | OUTPATIENT
Start: 2022-01-05 | End: 2022-05-24

## 2022-01-05 RX ORDER — OXYCODONE AND ACETAMINOPHEN 5; 325 MG/1; MG/1
1 TABLET ORAL EVERY 6 HOURS PRN
Qty: 12 TABLET | Refills: 0 | Status: SHIPPED | OUTPATIENT
Start: 2022-01-05 | End: 2022-01-05

## 2022-01-05 NOTE — PROGRESS NOTES
Dinora is a 56 year old who is being evaluated via a billable video visit.    38 minutes 45 total   Start 11:09  Stop 11:54  How would you like to obtain your AVS? [place]  If the video visit is dropped, the invitation should be resent by: Send to e-mail at: [email or web address]  Will anyone else be joining your video visit? No    Video Start Time: 11:58 AM  Video-Visit Details    Type of service:  Video Visit    Video End Time:11:58 AM    Originating Location (pt. Location): Home    Distant Location (provider location):  [facility] [place]     Platform used for Video Visit: [employer].  Dinora is very well-known to us with severe gastroparesis years post TPI AT she has a direct J and a direct G that were both revised on the 29th by us to low-profile.  Despite an initial relief from local irritation she is now quite miserable with pain at the direct J site.  There is induration there drainage of greenish-greenish liquid around it.  She is using it at a max of 45 cc/h.  She is obviously depressed and dejected over this is sleeping 12 to 14 hours a day has no energy.  We spent most of her 45 minutes reviewing possible options for gastroparesis #1 is domperidone and per recent text from Dr. Melissa is being reviewed with [place] leadership as to whether we can resume prescribing domperidone to Selina.  That is our best hope.  The other would be Botox injection into the pylorus and I will contact Dr. Dodson to see if he is willing to do that as it temporizing measure and also get some idea of the feasibility of G: Poem as a potential treatment.  Also will send her a paper prescription for a limited amount of Percocet and then follow-up by phone with [place] next week

## 2022-01-05 NOTE — LETTER
1/5/2022         RE: Dinora Mcghee  [address] [facility] [address]        Dear Colleague,    Thank you for referring your patient, Dinora Mcghee, to the [facility]. Please see a copy of my visit note below.    Dinora is a 56 year old who is being evaluated via a billable video visit.    38 minutes 45 total   Start 11:09  Stop 11:54  How would you like to obtain your AVS? MyChart  If the video visit is dropped, the invitation should be resent by: Send to e-mail at: [email or web address]  Will anyone else be joining your video visit? No    Video Start Time: 11:58 AM  Video-Visit Details    Type of service:  Video Visit    Video End Time:11:58 AM    Originating Location (pt. Location): Home    Distant Location (provider location):  [facility]     Platform used for Video Visit: [employer].  Dinora is very well-known to us with severe gastroparesis years post TPI AT she has a direct J and a direct G that were both revised on the 29th by us to low-profile.  Despite an initial relief from local irritation she is now quite miserable with pain at the direct J site.  There is induration there drainage of greenish-greenish liquid around it.  She is using it at a max of 45 cc/h.  She is obviously depressed and dejected over this is sleeping 12 to 14 hours a day has no energy.  We spent most of her 45 minutes reviewing possible options for gastroparesis #1 is domperidone and per recent text from Dr. Melissa is being reviewed with [employer] leadership as to whether we can resume prescribing domperidone to Selina.  That is our best hope.  The other would be Botox injection into the pylorus and I will contact Dr. Dodson to see if he is willing to do that as it temporizing measure and also get some idea of the feasibility of G: Poem as a potential treatment.  Also will send her a paper prescription for a limited amount of  Percocet and then follow-up by phone with [place] next week          Again, thank you for allowing me to participate in the care of your patient.        Sincerely,        Mandeep Park MD

## 2022-01-06 ENCOUNTER — VIRTUAL VISIT (OUTPATIENT)
Dept: PSYCHOLOGY | Facility: CLINIC | Age: 57
End: 2022-01-06
Payer: COMMERCIAL

## 2022-01-06 DIAGNOSIS — F43.23 ADJUSTMENT DISORDER WITH MIXED ANXIETY AND DEPRESSED MOOD: Primary | ICD-10-CM

## 2022-01-06 PROCEDURE — 90837 PSYTX W PT 60 MINUTES: CPT | Mod: 95 | Performed by: STUDENT IN AN ORGANIZED HEALTH CARE EDUCATION/TRAINING PROGRAM

## 2022-01-06 NOTE — PROGRESS NOTES
"  Health Psychology                                                                                                                          Sweta Michelle, Ph.D., L.P [phone]  Melva Beebe, Ph.D., L.P. [phone]  Rachel Sloan, Ph.D. [phone]  Kenya Sanchez, Ph.D., L.P. [phone]  Estrada Patel, Ph.D., A.B.P.P., L.P. [phone]         Enedina Joaquin, Ph.D., L.P. [phone]                [facility], 3rd Floor  [address] [place] [address]     Health Psychology Follow-up Visit - Telehealth Visit     Dinora Mcghee is a 56 year old female who is being evaluated via a billable video visit.       The patient has been notified of following:      \"This video visit will be conducted via a video visit between you and your physician/provider. We have found that certain health care needs can be provided without the need for an in-person physical exam.  This service lets us provide the care you need with a video conversation.  If a prescription is necessary we can send it directly to your pharmacy.  If lab work is needed we can place an order for that and you can then stop by our lab to have the test done at a later time.     Video visits are billed at different rates depending on your insurance coverage.  Please reach out to your insurance provider with any questions.     If during the course of the call the physician/provider feels a video visit is not appropriate, you will not be charged for this service.\"     Patient has given verbal consent for Video visit? Yes     Will anyone else be joining your video visit? No    Date of Service:  1/6/22    SUBJECTIVE:  Dinora Mcghee was seen for individual psychotherapy. Reviewed emotional and physical health since our last session.    Symptoms reported: Dinora reported some improvement in pain in interim, nausea has been consistent.  Currently waking up very nauseated every morning, pain levels moderate " to high. Mood has been low, feelings of hopelessness/helplessness, reduced confidence in ability to cope with stressors. Compared to previous visit, Dinora has been engaging in more activity.    Progress towards goals: Continuing to attend appointments, pursue treatment options, connecting with support network about treatment options as well. Engaging in creative projects around her house, completing them as well. Continuing to connect with social supports. Using personal practices of meditation/coping skills. Her friend sent her DBT skills manual and she plans to review this.     Interventions utilized: Reviewed experiences in interim and symptom progression. Compared and contrasted current symptoms to past symptoms.  Reviewed specific emotional reactions to changes in treatment plans and other events. Supported Dinora in identifying what makes coping with reality, and needing to cope, so difficult.  Provided an overview of DBT modules and what skills/domains could be helpful given current stressors (I.e. distress tolerance and emotion regulation).     Ms. Mcghee was engaged throughout the visit and expressed understanding of information provided.    OBJECTIVE:  Ms. Mcghee was available for scheduled visit.  She reported sad mood, fatigued..  Affect was mood- and thought-congruent; occassional brightening. Insight and judgment good.  Thought processes logical and linear. Speech WNL.  Denied suicidal ideation.     ASSESSMENT:  Psychological Assessment:  The PHQ-9 is an instrument for screening, diagnosing, monitoring and measuring the severity of depression. Scores of 5, 10, 15, and 20 represent cutpoints for mild, moderate, moderately severe and severe depression, respectively.   PHQ-9 SCORE 7/1/2019 6/21/2021 7/8/2021   PHQ-9 Total Score MyChart 3 (Minimal depression) 13 (Moderate depression) 13 (Moderate depression)   PHQ-9 Total Score 3 13 13       The DARCY-7 is an instrument for screening, diagnosing, monitoring  and measuring the severity of anxiety. Scores of 5, 10, and 15 represent cutpoints for mild, moderate, and severe anxiety, respectively.  DARCY-7 SCORE 1/11/2018 1/22/2018 6/21/2021   Total Score 2 (minimal anxiety) - 0 (minimal anxiety)   Total Score 2 2 0     Therapy appropriate for Dinora at this time.    DIAGNOSIS:  Adjustment disorder with mixed anxiety and depressed mood.     PLAN:  Follow-up visit in 2.5 weeks.  Will send Dinora guidance about specific DBT skills      Type of service: Telemedicine Psychotherapy for coping with health conditions and symptoms of depression and anxiety  Time of service:   ? Date: 1/6/22  ? Time Service Began: 8:00  ? Time Service Ended: 9:00  Reason that telemedicine is appropriate: Public health regulations due to COVID-19 pandemic/state of emergency  Mode of transmission: Secure real time interactive audio and visual telecommunication system via [email or web address]  Location of originating and distant sites:  ? Originating site (patient location): Patient's home  ? Distant site (provider site): Provider's home    Rachel Sloan, PhD,   Clinical Health Psychologist    Tx plan completed: 7/8/21  Tx plan due:  7/8/22

## 2022-01-08 ENCOUNTER — HEALTH MAINTENANCE LETTER (OUTPATIENT)
Age: 57
End: 2022-01-08

## 2022-01-11 ENCOUNTER — PREP FOR PROCEDURE (OUTPATIENT)
Dept: GASTROENTEROLOGY | Facility: CLINIC | Age: 57
End: 2022-01-11
Payer: COMMERCIAL

## 2022-01-11 ENCOUNTER — PATIENT OUTREACH (OUTPATIENT)
Dept: GASTROENTEROLOGY | Facility: CLINIC | Age: 57
End: 2022-01-11
Payer: COMMERCIAL

## 2022-01-11 DIAGNOSIS — K31.84 GASTROPARESIS: Primary | ICD-10-CM

## 2022-01-11 DIAGNOSIS — Z11.59 ENCOUNTER FOR SCREENING FOR OTHER VIRAL DISEASES: Primary | ICD-10-CM

## 2022-01-11 NOTE — PROGRESS NOTES
Called pt to discuss procedure as follow up to clinic visit with zulema Dyer     Procedure/Imaging/Clinic: EGD with botox injection   Physician: [facility]   Timing: next avail   Procedure length: 30 min   Anesthesia: MAC   Dx: gastroparesis   Tier: 2   Location: [facility]o     Case request placed, will message OR  to hold 2/3 date at [facility]    1/12/22 0910    Pt returned call. Explained they can expect a call from  for date and time of procedure, will need a , someone to stay with them for 24 hours and should stay in town for 24 hours (within 45 min of Hospital) post procedure    Patient needs to get pre-op physical completed. If outside  health system will need physical faxed to number [phone]   If you do not get a preop physical, your procedure could be cancelled, patient voiced understanding*    Preop Plan: H & P 12/29/21 to be updated    Med Review    Blood thinner -  none  ASA - none  Diabetic - s/p TPI AT, not on insulin, using a continuous glucose monitoring    COVID test discussed: yes, pt understand needs to be within 4 days of procedure     Patient Education r/t procedure: mychart    Does patient have any history of gastric bypass/gastric surgery/altered panc/bili anatomy? Total pancreatectomy and auto islet transplant    A pre-op nurse will call 1-2 days prior to the procedure.     Verbalized understanding of all instructions. All questions answered.     Gina Arrieta, RN, BSN,   [facility]  Care coordinator

## 2022-01-13 NOTE — NURSING NOTE
Chief Complaint   Patient presents with     Port Draw     accessed with Gripper  needle for labs heparin locked and de accessed         Cigarettes

## 2022-01-17 ENCOUNTER — VIRTUAL VISIT (OUTPATIENT)
Dept: GASTROENTEROLOGY | Facility: CLINIC | Age: 57
End: 2022-01-17
Payer: COMMERCIAL

## 2022-01-17 DIAGNOSIS — K31.84 GASTROPARESIS: Primary | ICD-10-CM

## 2022-01-17 PROCEDURE — 99215 OFFICE O/P EST HI 40 MIN: CPT | Mod: 95 | Performed by: INTERNAL MEDICINE

## 2022-01-17 NOTE — PATIENT INSTRUCTIONS
Follow up:    Dr. Park has outlined the following steps after your recent clinic visit:    -Will send referral to Dr.Vasudha Mahajan at [facility]. Her clinic will reach out to you to schedule an appointment.    -Follow-up with Dr. Park in 2 months or sooner if needed. Our Clinic Coordinators will reach out to you to schedule. Please call the scheduling number below to schedule if you do not hear from us.      Please call with any questions or concerns regarding your clinic visit today.    It is a pleasure being involved in your health care.    Contacts post-consultation depending on your need:    Schedule Clinic Appointments            [phone] # 1   M-F 7:30 - 5 pm    Kenya Victor RN Care Coordinator  [phone]    Mansoor Dhillon LPN    [phone]     OR Procedure Scheduling                             [phone]    My Chart is available 24 hours a day and is a secure way to access your records and communicate with your care team.  I strongly recommend signing up if you haven't already done so, if you are comfortable with computers.  If you would like to inquire about this or are having problems with My Chart access, you may call [phone] or go online at [email or web address].  Please allow at least 24 hours for a response and extra time on weekends and Holidays.

## 2022-01-17 NOTE — PROGRESS NOTES
Dinora is a 56 year old who is being evaluated via a billable video visit.        Patient verified meds and allergies are correct via patients echeck in.    Saray Quigley, Virtual Facilitator    How would you like to obtain your AVS? MyChart  If the video visit is dropped, the invitation should be resent by: Text to cell phone: [phone]  Will anyone else be joining your video visit? No    Video Start Time: 1:30 PM  Video-Visit Details    Type of service:  Video Visit    Video End Time:1:30 PM    Originating Location (pt. Location): Home    Distant Location (provider location):  [facility] [place]     Platform used for Video Visit: [employer]     You were on a call for 46 minutes 03 seconds    Start: 01/17/2022 12:43 pm  Stop: 01/17/2022 01:29 pm      Dinora is following up regarding severe gastroparesis and major debilitating issues with a direct jejunostomy tube with regards to constant local pain and leakage.  She has recently started acupuncture at a local facility in [facility] and that went very well with relief of nausea for couple of days.  Her overarching issue though is pain at the J-tube site and not the G-tube site.  We spent a total of 45 minutes on a video call reviewing all the options for gastroparesis that might enable her to improve oral intake to the point that she could remove the J-tube.  First is she scheduled for Botox injection with Dr. Dodson.  Also continue acupuncture.  We also discussed the progress of the domperidone IND and IRB approval which is in process.  She is really becoming depressed about this issue and is on no antidepressants.  She also has not seen our comprehensive pain clinic.  My main suggestion today is that I ask  if she would be willing to see her both for medical management of pain but also for local blocks which may have an important temporizing role while we try to treat her gastroparesis to the point that she can come off  jejunal tube feeds and maintain oral nutrition.  We also discussed that there may be a point which she should just turn down or stop the J-tube feedings per day and see if that improves her appetite.    Plan  Refer to Dr.Vasudha Mahajan at [facility]  FU w me in 2 months or sooner

## 2022-01-17 NOTE — LETTER
1/17/2022         RE: Dinora Mcghee  [address] [facility] [address]        Dear Colleague,    Thank you for referring your patient, Dinora Mcghee, to the [facility] [place]. Please see a copy of my visit note below.        Dinora is following up regarding severe gastroparesis and major debilitating issues with a direct jejunostomy tube with regards to constant local pain and leakage.  She has recently started acupuncture at a local facility in [facility] and that went very well with relief of nausea for couple of days.  Her overarching issue though is pain at the J-tube site and not the G-tube site.  We spent a total of 45 minutes on a video call reviewing all the options for gastroparesis that might enable her to improve oral intake to the point that she could remove the J-tube.  First is she scheduled for Botox injection with Dr. Dodson.  Also continue acupuncture.  We also discussed the progress of the domperidone IND and IRB approval which is in process.  She is really becoming depressed about this issue and is on no antidepressants.  She also has not seen our comprehensive pain clinic.  My main suggestion today is that I ask  if she would be willing to see her both for medical management of pain but also for local blocks which may have an important temporizing role while we try to treat her gastroparesis to the point that she can come off jejunal tube feeds and maintain oral nutrition.  We also discussed that there may be a point which she should just turn down or stop the J-tube feedings per day and see if that improves her appetite.    Plan  Refer to Dr.Vasudha Mahajan at [facility]  FU w me in 2 months or sooner        Again, thank you for allowing me to participate in the care of your patient.        Sincerely,        Mandeep Park MD

## 2022-01-19 DIAGNOSIS — K90.9 MALABSORPTION: ICD-10-CM

## 2022-01-19 DIAGNOSIS — Z90.410 POST-PANCREATECTOMY DIABETES (H): ICD-10-CM

## 2022-01-19 DIAGNOSIS — K86.89 PANCREATIC INSUFFICIENCY: Primary | ICD-10-CM

## 2022-01-19 DIAGNOSIS — E13.9 POST-PANCREATECTOMY DIABETES (H): ICD-10-CM

## 2022-01-19 DIAGNOSIS — E89.1 POST-PANCREATECTOMY DIABETES (H): ICD-10-CM

## 2022-01-20 ENCOUNTER — TELEPHONE (OUTPATIENT)
Dept: INTERNAL MEDICINE | Facility: CLINIC | Age: 57
End: 2022-01-20
Payer: COMMERCIAL

## 2022-01-20 ENCOUNTER — MEDICAL CORRESPONDENCE (OUTPATIENT)
Dept: HEALTH INFORMATION MANAGEMENT | Facility: CLINIC | Age: 57
End: 2022-01-20
Payer: COMMERCIAL

## 2022-01-20 NOTE — TELEPHONE ENCOUNTER
Health Call Center    Phone Message    May a detailed message be left on voicemail: yes     Reason for Call: Other: Yoselyn from [facility] calling to report she is discharging Dinora from her service since the patient has reached her goals and is confident in using her G-Tube and monitoring her blood sugars. Patient is confident in contacting her care team if she has any concerns.      Yoselyn #: [phone]    Action Taken: Message routed to:  [facility]    Travel Screening: Not Applicable

## 2022-01-21 DIAGNOSIS — R10.13 EPIGASTRIC PAIN: Primary | ICD-10-CM

## 2022-01-21 RX ORDER — DULOXETIN HYDROCHLORIDE 30 MG/1
CAPSULE, DELAYED RELEASE ORAL
Qty: 67 CAPSULE | Refills: 1 | Status: ON HOLD | OUTPATIENT
Start: 2022-01-22 | End: 2022-02-03

## 2022-01-24 ENCOUNTER — MYC MEDICAL ADVICE (OUTPATIENT)
Dept: TRANSPLANT | Facility: CLINIC | Age: 57
End: 2022-01-24
Payer: COMMERCIAL

## 2022-01-24 DIAGNOSIS — Z11.59 ENCOUNTER FOR SCREENING FOR OTHER VIRAL DISEASES: Primary | ICD-10-CM

## 2022-01-25 ENCOUNTER — VIRTUAL VISIT (OUTPATIENT)
Dept: PSYCHOLOGY | Facility: CLINIC | Age: 57
End: 2022-01-25
Payer: COMMERCIAL

## 2022-01-25 DIAGNOSIS — F43.23 ADJUSTMENT DISORDER WITH MIXED ANXIETY AND DEPRESSED MOOD: Primary | ICD-10-CM

## 2022-01-25 PROCEDURE — 90837 PSYTX W PT 60 MINUTES: CPT | Mod: 95 | Performed by: STUDENT IN AN ORGANIZED HEALTH CARE EDUCATION/TRAINING PROGRAM

## 2022-01-25 NOTE — PROGRESS NOTES
"  Health Psychology                                                                                                                          Sweta Michelle, Ph.D., L.P [phone]  Melva Beebe, Ph.D., L.P. [phone]  Rachel Sloan, Ph.D. [phone]  Kenya Sanchez, Ph.D., L.P. [phone]  Estrada Patel, Ph.D., A.B.P.P., L.P. [phone]         Enedina Joaquin, Ph.D., L.P. [phone]                [facility], 3rd Floor  [address] [place] [address]     Health Psychology Follow-up Visit - Telehealth Visit     Dinora Mcghee is a 56 year old female who is being evaluated via a billable video visit.       The patient has been notified of following:      \"This video visit will be conducted via a video visit between you and your physician/provider. We have found that certain health care needs can be provided without the need for an in-person physical exam.  This service lets us provide the care you need with a video conversation.  If a prescription is necessary we can send it directly to your pharmacy.  If lab work is needed we can place an order for that and you can then stop by our lab to have the test done at a later time.     Video visits are billed at different rates depending on your insurance coverage.  Please reach out to your insurance provider with any questions.     If during the course of the call the physician/provider feels a video visit is not appropriate, you will not be charged for this service.\"     Patient has given verbal consent for Video visit? Yes     Will anyone else be joining your video visit? No    Date of Service:  1/25/22    SUBJECTIVE:  Dinora Mcghee was seen for individual psychotherapy. Reviewed emotional and physical health since our last session.    Symptoms reported: Most prominent and concerning symptoms are constipation and left-sided pain which providers think may be related to nerve pain. Taking Cymbalta. " Ongoing nausea, limited appetite. Increased leaking today out of j-tube. Has not continued to lose weight but not gaining.     Progress towards goals: Working with medical team actively. Meeting with accupuncture provider, finding benefit. Has gone back to the [place], walking more.  Reading more. Pacing by taking nap during the day.     Interventions utilized: Reviewed experiences in interim. Discussed factors contributing to improved mood: having meaningful work, more sunshine, moving more. Discussed how to maintain positive aspects. Reviewed patterns of pain and intrusiveness of j-tube.  Discussed how this impacts Dinora's quality of life and the frustration it leads to given the fact it was supposed to help her. Validated Dinora's emotional experiences.  Reviewed efforts to connect with medical team for support in managing leakage, discussed possible treatment options. At the end of the visit Dinora reminded me of sending her information about DBT materials.  Sent MyChart message after visit.     Ms. Mcghee was engaged throughout the visit and expressed understanding of information provided.    OBJECTIVE:  Ms. Mcghee was available for scheduled visit.  She reported improved mood, frustrated at times with j-tube difficulties..  Affect was mood- and thought-congruent. Insight and judgment good.  Thought processes logical and linear. Speech WNL.  Denied suicidal ideation.     ASSESSMENT:  Psychological Assessment:  The PHQ-9 is an instrument for screening, diagnosing, monitoring and measuring the severity of depression. Scores of 5, 10, 15, and 20 represent cutpoints for mild, moderate, moderately severe and severe depression, respectively.   PHQ-9 SCORE 7/1/2019 6/21/2021 7/8/2021   PHQ-9 Total Score MyChart 3 (Minimal depression) 13 (Moderate depression) 13 (Moderate depression)   PHQ-9 Total Score 3 13 13       The DARCY-7 is an instrument for screening, diagnosing, monitoring and measuring the severity of anxiety.  Scores of 5, 10, and 15 represent cutpoints for mild, moderate, and severe anxiety, respectively.  DARCY-7 SCORE 1/11/2018 1/22/2018 6/21/2021   Total Score 2 (minimal anxiety) - 0 (minimal anxiety)   Total Score 2 2 0     Therapy appropriate for Dinora at this time.    DIAGNOSIS:  Adjustment disorder with mixed anxiety and depressed mood.     PLAN:  Follow-up visit in two weeks     Type of service: Telemedicine Psychotherapy for coping with health conditions and symptoms of depression and anxiety  Time of service:   ? Date: 1/25/22  ? Time Service Began: 10:00  ? Time Service Ended: 10:55  Reason that telemedicine is appropriate: Public health regulations due to COVID-19 pandemic/state of emergency  Mode of transmission: Secure real time interactive audio and visual telecommunication system via [email or web address]  Location of originating and distant sites:  ? Originating site (patient location): Patient's home  ? Distant site (provider site): Provider's office    Rachel Sloan, PhD, LP  Clinical Health Psychologist    Tx plan completed: 7/8/21  Tx plan due:  7/8/22

## 2022-01-27 ENCOUNTER — PREP FOR PROCEDURE (OUTPATIENT)
Dept: GASTROENTEROLOGY | Facility: CLINIC | Age: 57
End: 2022-01-27
Payer: COMMERCIAL

## 2022-01-27 RX ORDER — ONDANSETRON 2 MG/ML
4 INJECTION INTRAMUSCULAR; INTRAVENOUS
Status: CANCELLED | OUTPATIENT
Start: 2022-01-27

## 2022-01-27 RX ORDER — LIDOCAINE 40 MG/G
CREAM TOPICAL
Status: CANCELLED | OUTPATIENT
Start: 2022-01-27

## 2022-01-28 ENCOUNTER — HOSPITAL ENCOUNTER (OUTPATIENT)
Facility: CLINIC | Age: 57
Discharge: HOME | End: 2022-01-28
Attending: INTERNAL MEDICINE | Admitting: INTERNAL MEDICINE
Payer: COMMERCIAL

## 2022-01-28 ENCOUNTER — HOSPITAL ENCOUNTER (OUTPATIENT)
Facility: CLINIC | Age: 57
Discharge: STILL ADMITTED OR ADMINISTRATIVE | End: 2022-01-28
Attending: INTERNAL MEDICINE | Admitting: INTERNAL MEDICINE
Payer: COMMERCIAL

## 2022-01-28 VITALS
HEART RATE: 85 BPM | WEIGHT: 136.69 LBS | TEMPERATURE: 97.8 F | HEIGHT: 67 IN | RESPIRATION RATE: 14 BRPM | SYSTOLIC BLOOD PRESSURE: 104 MMHG | DIASTOLIC BLOOD PRESSURE: 65 MMHG | OXYGEN SATURATION: 100 % | BODY MASS INDEX: 21.45 KG/M2

## 2022-01-28 LAB — GLUCOSE BLDC GLUCOMTR-MCNC: 128 MG/DL (ref 70–99)

## 2022-01-28 PROCEDURE — 82962 GLUCOSE BLOOD TEST: CPT

## 2022-01-28 PROCEDURE — 999N000099 HC STATISTIC MODERATE SEDATION < 10 MIN: Performed by: INTERNAL MEDICINE

## 2022-01-28 PROCEDURE — G0500 MOD SEDAT ENDO SERVICE >5YRS: HCPCS | Performed by: INTERNAL MEDICINE

## 2022-01-28 PROCEDURE — 250N000011 HC RX IP 250 OP 636: Performed by: INTERNAL MEDICINE

## 2022-01-28 PROCEDURE — G0463 HOSPITAL OUTPT CLINIC VISIT: HCPCS

## 2022-01-28 RX ORDER — FENTANYL CITRATE 50 UG/ML
INJECTION, SOLUTION INTRAMUSCULAR; INTRAVENOUS PRN
Status: COMPLETED | OUTPATIENT
Start: 2022-01-28 | End: 2022-01-28

## 2022-01-28 RX ADMIN — MIDAZOLAM 1 MG: 1 INJECTION INTRAMUSCULAR; INTRAVENOUS at 09:59

## 2022-01-28 RX ADMIN — MIDAZOLAM 1 MG: 1 INJECTION INTRAMUSCULAR; INTRAVENOUS at 10:01

## 2022-01-28 RX ADMIN — FENTANYL CITRATE 50 MCG: 50 INJECTION, SOLUTION INTRAMUSCULAR; INTRAVENOUS at 09:59

## 2022-01-28 RX ADMIN — FENTANYL CITRATE 50 MCG: 50 INJECTION, SOLUTION INTRAMUSCULAR; INTRAVENOUS at 10:01

## 2022-01-28 ASSESSMENT — MIFFLIN-ST. JEOR: SCORE: 1247.63

## 2022-01-28 NOTE — OR NURSING
Augustus tube removal at [facility], pt noted to be apneic after sedation, 100% oxygen saturation, did not respond to stimuli, bag mask ventilation initiated for brief time. Pt initiated breaths within a few minutes.

## 2022-01-28 NOTE — DISCHARGE INSTRUCTIONS
[facility]  Same-Day Surgery   Adult Discharge Orders & Instructions     For 24 hours after surgery    1. Get plenty of rest.  A responsible adult must stay with you for at least 24 hours after you leave the hospital.   2. Do not drive or use heavy equipment.  If you have weakness or tingling, don't drive or use heavy equipment until this feeling goes away.  3. Do not drink alcohol.  4. Avoid strenuous or risky activities.  Ask for help when climbing stairs.   5. You may feel lightheaded.  IF so, sit for a few minutes before standing.  Have someone help you get up.   6. If you have nausea (feel sick to your stomach): Drink only clear liquids such as apple juice, ginger ale, broth or 7-Up.  Rest may also help.  Be sure to drink enough fluids.  Move to a regular diet as you feel able.  7. You may have a slight fever. Call the doctor if your fever is over 100 F (37.7 C) (taken under the tongue) or lasts longer than 24 hours.  8. You may have a dry mouth, a sore throat, muscle aches or trouble sleeping.  These should go away after 24 hours.  9. Do not make important or legal decisions.   Call your doctor for any of the followin.  Signs of infection (fever, growing tenderness at the surgery site, a large amount of drainage or bleeding, severe pain, foul-smelling drainage, redness, swelling).    2. It has been over 8 to 10 hours since surgery and you are still not able to urinate (pass water).    3.  Headache for over 24 hours.    To contact a doctor, call Dr Park's office between 8am-4pm Monday- Friday at [phone] or:        [phone] and ask for the resident on call for the endoscopy resident  (answered 24 hours a day)      Emergency Department:    [facility]: [phone]       (TTY for hearing impaired: [phone])    [facility]: [phone]       (TTY for hearing impaired: [phone])

## 2022-01-28 NOTE — OR NURSING
Patient Covid tested outside at [facility], no symptoms today, ok to proceed without results per endoscopy staff. Staff to wear n-95 during case.

## 2022-01-31 ENCOUNTER — PREP FOR PROCEDURE (OUTPATIENT)
Dept: GASTROENTEROLOGY | Facility: CLINIC | Age: 57
End: 2022-01-31
Payer: COMMERCIAL

## 2022-02-03 ENCOUNTER — HOSPITAL ENCOUNTER (OUTPATIENT)
Facility: CLINIC | Age: 57
Discharge: HOME | End: 2022-02-03
Attending: INTERNAL MEDICINE | Admitting: INTERNAL MEDICINE
Payer: COMMERCIAL

## 2022-02-03 ENCOUNTER — ANESTHESIA EVENT (OUTPATIENT)
Dept: GASTROENTEROLOGY | Facility: CLINIC | Age: 57
End: 2022-02-03
Payer: COMMERCIAL

## 2022-02-03 ENCOUNTER — ANESTHESIA (OUTPATIENT)
Dept: GASTROENTEROLOGY | Facility: CLINIC | Age: 57
End: 2022-02-03
Payer: COMMERCIAL

## 2022-02-03 VITALS
OXYGEN SATURATION: 100 % | DIASTOLIC BLOOD PRESSURE: 72 MMHG | SYSTOLIC BLOOD PRESSURE: 108 MMHG | TEMPERATURE: 98.3 F | RESPIRATION RATE: 15 BRPM | HEART RATE: 83 BPM

## 2022-02-03 LAB — SMALL BOWEL ENTEROSCOPY: NORMAL

## 2022-02-03 PROCEDURE — 250N000009 HC RX 250: Performed by: NURSE ANESTHETIST, CERTIFIED REGISTERED

## 2022-02-03 PROCEDURE — 250N000020 HC RX IP 250 OP 636 J0585: Performed by: INTERNAL MEDICINE

## 2022-02-03 PROCEDURE — 370N000017 HC ANESTHESIA TECHNICAL FEE, PER MIN: Performed by: INTERNAL MEDICINE

## 2022-02-03 PROCEDURE — 44360 SMALL BOWEL ENDOSCOPY: CPT | Performed by: INTERNAL MEDICINE

## 2022-02-03 PROCEDURE — 258N000003 HC RX IP 258 OP 636: Performed by: NURSE ANESTHETIST, CERTIFIED REGISTERED

## 2022-02-03 PROCEDURE — 999N000010 HC STATISTIC ANES STAT CODE-CRNA PER MINUTE: Performed by: INTERNAL MEDICINE

## 2022-02-03 PROCEDURE — 250N000011 HC RX IP 250 OP 636: Performed by: NURSE ANESTHETIST, CERTIFIED REGISTERED

## 2022-02-03 PROCEDURE — 250N000011 HC RX IP 250 OP 636: Performed by: INTERNAL MEDICINE

## 2022-02-03 RX ORDER — NALOXONE HYDROCHLORIDE 0.4 MG/ML
0.4 INJECTION, SOLUTION INTRAMUSCULAR; INTRAVENOUS; SUBCUTANEOUS
Status: DISCONTINUED | OUTPATIENT
Start: 2022-02-03 | End: 2022-02-03 | Stop reason: HOSPADM

## 2022-02-03 RX ORDER — ONDANSETRON 2 MG/ML
INJECTION INTRAMUSCULAR; INTRAVENOUS PRN
Status: COMPLETED | OUTPATIENT
Start: 2022-02-03 | End: 2022-02-03

## 2022-02-03 RX ORDER — ONDANSETRON 2 MG/ML
INJECTION INTRAMUSCULAR; INTRAVENOUS PRN
Status: DISCONTINUED | OUTPATIENT
Start: 2022-02-03 | End: 2022-02-03

## 2022-02-03 RX ORDER — FLUMAZENIL 0.1 MG/ML
0.2 INJECTION, SOLUTION INTRAVENOUS
Status: DISCONTINUED | OUTPATIENT
Start: 2022-02-03 | End: 2022-02-03 | Stop reason: HOSPADM

## 2022-02-03 RX ORDER — PROPOFOL 10 MG/ML
INJECTION, EMULSION INTRAVENOUS CONTINUOUS PRN
Status: DISCONTINUED | OUTPATIENT
Start: 2022-02-03 | End: 2022-02-03

## 2022-02-03 RX ORDER — NALOXONE HYDROCHLORIDE 0.4 MG/ML
0.2 INJECTION, SOLUTION INTRAMUSCULAR; INTRAVENOUS; SUBCUTANEOUS
Status: DISCONTINUED | OUTPATIENT
Start: 2022-02-03 | End: 2022-02-03 | Stop reason: HOSPADM

## 2022-02-03 RX ORDER — SODIUM CHLORIDE, SODIUM LACTATE, POTASSIUM CHLORIDE, CALCIUM CHLORIDE 600; 310; 30; 20 MG/100ML; MG/100ML; MG/100ML; MG/100ML
INJECTION, SOLUTION INTRAVENOUS CONTINUOUS PRN
Status: DISCONTINUED | OUTPATIENT
Start: 2022-02-03 | End: 2022-02-03

## 2022-02-03 RX ORDER — ONDANSETRON 2 MG/ML
4 INJECTION INTRAMUSCULAR; INTRAVENOUS
Status: DISCONTINUED | OUTPATIENT
Start: 2022-02-03 | End: 2022-02-03 | Stop reason: HOSPADM

## 2022-02-03 RX ORDER — LIDOCAINE 40 MG/G
CREAM TOPICAL
Status: DISCONTINUED | OUTPATIENT
Start: 2022-02-03 | End: 2022-02-03 | Stop reason: HOSPADM

## 2022-02-03 RX ORDER — ONDANSETRON 4 MG/1
4 TABLET, ORALLY DISINTEGRATING ORAL EVERY 6 HOURS PRN
Status: DISCONTINUED | OUTPATIENT
Start: 2022-02-03 | End: 2022-02-03 | Stop reason: HOSPADM

## 2022-02-03 RX ORDER — FENTANYL CITRATE 50 UG/ML
INJECTION, SOLUTION INTRAMUSCULAR; INTRAVENOUS PRN
Status: DISCONTINUED | OUTPATIENT
Start: 2022-02-03 | End: 2022-02-03

## 2022-02-03 RX ORDER — LIDOCAINE HYDROCHLORIDE 20 MG/ML
INJECTION, SOLUTION INFILTRATION; PERINEURAL PRN
Status: DISCONTINUED | OUTPATIENT
Start: 2022-02-03 | End: 2022-02-03

## 2022-02-03 RX ORDER — PROPOFOL 10 MG/ML
INJECTION, EMULSION INTRAVENOUS PRN
Status: DISCONTINUED | OUTPATIENT
Start: 2022-02-03 | End: 2022-02-03

## 2022-02-03 RX ORDER — ONDANSETRON 2 MG/ML
4 INJECTION INTRAMUSCULAR; INTRAVENOUS EVERY 6 HOURS PRN
Status: DISCONTINUED | OUTPATIENT
Start: 2022-02-03 | End: 2022-02-03 | Stop reason: HOSPADM

## 2022-02-03 RX ORDER — PROCHLORPERAZINE MALEATE 10 MG
10 TABLET ORAL EVERY 6 HOURS PRN
Status: DISCONTINUED | OUTPATIENT
Start: 2022-02-03 | End: 2022-02-03 | Stop reason: HOSPADM

## 2022-02-03 RX ADMIN — ONABOTULINUMTOXINA 200 UNITS: 100 INJECTION, POWDER, LYOPHILIZED, FOR SOLUTION INTRADERMAL; INTRAMUSCULAR at 14:15

## 2022-02-03 RX ADMIN — LIDOCAINE HYDROCHLORIDE 60 MG: 20 INJECTION, SOLUTION INFILTRATION; PERINEURAL at 13:48

## 2022-02-03 RX ADMIN — ONDANSETRON 4 MG: 2 INJECTION INTRAMUSCULAR; INTRAVENOUS at 15:30

## 2022-02-03 RX ADMIN — DEXMEDETOMIDINE HYDROCHLORIDE 12 MCG: 100 INJECTION, SOLUTION INTRAVENOUS at 13:53

## 2022-02-03 RX ADMIN — SUCCINYLCHOLINE CHLORIDE 100 MG: 20 INJECTION, SOLUTION INTRAMUSCULAR; INTRAVENOUS; PARENTERAL at 13:53

## 2022-02-03 RX ADMIN — PROPOFOL 150 MG: 10 INJECTION, EMULSION INTRAVENOUS at 13:53

## 2022-02-03 RX ADMIN — ONDANSETRON 4 MG: 2 INJECTION INTRAMUSCULAR; INTRAVENOUS at 14:00

## 2022-02-03 RX ADMIN — MIDAZOLAM 2 MG: 1 INJECTION INTRAMUSCULAR; INTRAVENOUS at 13:48

## 2022-02-03 RX ADMIN — PROPOFOL 150 MCG/KG/MIN: 10 INJECTION, EMULSION INTRAVENOUS at 13:57

## 2022-02-03 RX ADMIN — FENTANYL CITRATE 50 MCG: 50 INJECTION, SOLUTION INTRAMUSCULAR; INTRAVENOUS at 13:53

## 2022-02-03 RX ADMIN — SODIUM CHLORIDE, POTASSIUM CHLORIDE, SODIUM LACTATE AND CALCIUM CHLORIDE: 600; 310; 30; 20 INJECTION, SOLUTION INTRAVENOUS at 13:48

## 2022-02-03 ASSESSMENT — LIFESTYLE VARIABLES: TOBACCO_USE: 1

## 2022-02-03 ASSESSMENT — ENCOUNTER SYMPTOMS: SEIZURES: 0

## 2022-02-03 NOTE — ANESTHESIA POSTPROCEDURE EVALUATION
Patient: Dinora Mcghee    Procedure: Procedure(s):  ESOPHAGOGASTRODUODENOSCOPY (EGD)  ENTEROSCOPY with possible fistula closure       Diagnosis:Gastroparesis [K31.84]  Diagnosis Additional Information: No value filed.    Anesthesia Type:  General    Note:  Disposition: Outpatient   Postop Pain Control: Uneventful            Sign Out: Well controlled pain   PONV: No   Neuro/Psych: Uneventful            Sign Out: Acceptable/Baseline neuro status   Airway/Respiratory: Uneventful            Sign Out: Acceptable/Baseline resp. status   CV/Hemodynamics: Uneventful            Sign Out: Acceptable CV status   Other NRE: NONE   DID A NON-ROUTINE EVENT OCCUR? No           Last vitals:  Vitals Value Taken Time   /74 02/03/22 1500   Temp 36.8  C (98.3  F) 02/03/22 1430   Pulse 81 02/03/22 1511   Resp 12 02/03/22 1511   SpO2 97 % 02/03/22 1511   Vitals shown include unvalidated device data.    Electronically Signed By: Thaddeus Lynn MD  February 3, 2022  3:29 PM

## 2022-02-03 NOTE — ANESTHESIA PREPROCEDURE EVALUATION
Anesthesia Pre-Procedure Evaluation    Patient: Dinora Mcghee   MRN: [id number] : 1965        Preoperative Diagnosis: Gastroparesis [K31.84]    Procedure : Procedure(s):  ESOPHAGOGASTRODUODENOSCOPY (EGD)  ENTEROSCOPY with possible fistula closure          Past Medical History:   Diagnosis Date     Allergic rhinitis, cause unspecified      Allergy to other foods     strawberries, apples, celeries, alice, watermelon     Arthritis     left knee     Choledocholithiasis     long after cholecystectomy     Chronic abdominal pain      Chronic constipation      Chronic nausea      Chronic pancreatitis (H)      Degeneration of lumbar or lumbosacral intervertebral disc      Esophageal reflux     w/ hiatal hernia     Gastroparesis      Hiatal hernia      History of pituitary adenoma     s/p resection     Hypothyroidism      Migraines      Mild hyperlipidemia      On tube feeding diet     presence of GJ tube     Pancreatic disease     PD stricture, 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 endometriosis growth     APPENDECTOMY        SECTION       COLONOSCOPY       ENDOSCOPIC INSERTION TUBE GASTROSTOMY N/A 2021    Procedure: Gastrojejonostomy placement with jejunopexy, PEG tube exchange;  Surgeon: Zackery Montoya MD;  [facility]     ENDOSCOPIC RETROGRADE CHOLANGIOPANCREATOGRAM N/A 2015    Procedure: ENDOSCOPIC RETROGRADE CHOLANGIOPANCREATOGRAM;  Surgeon: Mandeep Park MD;  [facility]: [facility]     ENDOSCOPIC RETROGRADE CHOLANGIOPANCREATOGRAM N/A 2016    Procedure: COMBINED ENDOSCOPIC RETROGRADE CHOLANGIOPANCREATOGRAPHY, PLACE TUBE/STENT;  Surgeon: Mandeep Park MD;  [facility]     ENDOSCOPIC RETROGRADE  CHOLANGIOPANCREATOGRAM N/A 3/17/2016    Procedure: COMBINED ENDOSCOPIC RETROGRADE CHOLANGIOPANCREATOGRAPHY, REMOVE FOREIGN BODY OR STENT/TUBE;  Surgeon: Mandeep Park MD;  [facility]     ENDOSCOPIC RETROGRADE CHOLANGIOPANCREATOGRAM N/A 8/2/2016    Procedure: COMBINED ENDOSCOPIC RETROGRADE CHOLANGIOPANCREATOGRAPHY, PLACE TUBE/STENT;  Surgeon: Mandeep Park MD;  [facility]     ENDOSCOPIC RETROGRADE CHOLANGIOPANCREATOGRAM N/A 8/26/2016    Procedure: COMBINED ENDOSCOPIC RETROGRADE CHOLANGIOPANCREATOGRAPHY, REMOVE FOREIGN BODY OR STENT/TUBE;  Surgeon: Mandeep Park MD;  [facility]     ENDOSCOPIC ULTRASOUND UPPER GASTROINTESTINAL TRACT (GI) N/A 10/3/2016    Procedure: ENDOSCOPIC ULTRASOUND, ESOPHAGOSCOPY / UPPER GASTROINTESTINAL TRACT (GI);  Surgeon: Guru Jose Rodas MD;  [facility]     ESOPHAGOSCOPY, GASTROSCOPY, DUODENOSCOPY (EGD), COMBINED N/A 6/24/2015    Procedure: COMBINED ESOPHAGOSCOPY, GASTROSCOPY, DUODENOSCOPY (EGD), REMOVE FOREIGN BODY;  Surgeon: Mandeep Park MD;  [facility]     ESOPHAGOSCOPY, GASTROSCOPY, DUODENOSCOPY (EGD), COMBINED N/A 10/25/2015    Procedure: COMBINED ESOPHAGOSCOPY, GASTROSCOPY, DUODENOSCOPY (EGD);  Surgeon: Sammy Amaro MD;  [facility]     ESOPHAGOSCOPY, GASTROSCOPY, DUODENOSCOPY (EGD), COMBINED N/A 10/25/2015    Procedure: COMBINED ESOPHAGOSCOPY, GASTROSCOPY, DUODENOSCOPY (EGD), BIOPSY SINGLE OR MULTIPLE;  Surgeon: Sammy Amaro MD;  [facility]     ESOPHAGOSCOPY, GASTROSCOPY, DUODENOSCOPY (EGD), DILATATION, COMBINED       EXCISE LESION TRUNK N/A 4/17/2017    Procedure: EXCISE LESION TRUNK;  Removal of Abdominal Foreign Body;  Surgeon: Nestor Phoenix MD;  [facility]     HC ESOPH/GAS REFLUX TEST W NASAL IMPED >1 HR N/A 11/19/2015    Procedure: ESOPHAGEAL IMPEDENCE FUNCTION TEST WITH 24 HOUR PH GREATER THAN 1 HOUR;  Surgeon: Thiago Apple MD;  Location: [facility]     HC UGI  ENDOSCOPY DIAG W BIOPSY  9/17/08      [facility] ENDOSCOPY DIAG W BIOPSY  9/27/12     HC UGI ENDOSCOPY W ESOPHAGEAL DILATION BALLOON <30MM  9/17/08     HC UGI ENDOSCOPY W EUS N/A 5/5/2015    Procedure: COMBINED ENDOSCOPIC ULTRASOUND, ESOPHAGOSCOPY, GASTROSCOPY, DUODENOSCOPY (EGD);  Surgeon: Wm Dueñas MD;  [facility]: [facility]     HC WRIST ARTHROSCOP,RELEASE XVERS LIG Bilateral 12/17/08     INJECT TRANSVERSUS ABDOMINIS PLANE (TAP) BLOCK BILATERAL Left 9/22/2016    Procedure: INJECT TRANSVERSUS ABDOMINIS PLANE (TAP) BLOCK BILATERAL;  Surgeon: Dickson Corrigan MD;  [facility]     laparoscopic pineda  1995     LAPAROSCOPIC HERNIORRHAPHY INCISIONAL N/A 8/23/2018    Procedure: LAPAROSCOPIC HERNIORRHAPHY INCISIONAL;  Laparoscopic Incisional Hernia Repair with Symbotex Mesh Implant;  Surgeon: Nestor Phoenix MD;  [facility]     LAPAROSCOPIC PANCREATECTOMY, TRANSPLANT AUTO ISLET CELL N/A 12/28/2016    Procedure: LAPAROSCOPIC PANCREATECTOMY, TRANSPLANT AUTO ISLET CELL;  Surgeon: Nestor Phoenix MD;  [facility]     REMOVE GASTROSTOMY TUBE ADULT N/A 1/28/2022    Procedure: REMOVAL, GASTROSTOMY TUBE, ADULT;  Surgeon: Mandeep Park MD;  [facility]     REPLACE GASTROJEJUNOSTOMY TUBE, PERCUTANEOUS N/A 9/7/2021    Procedure: GASTROJEJUNOSTOMY TUBE PLACEMENT, PERCUTANEOUS, WITH GASTROPEXY;  Surgeon: Mandeep Park MD;  [facility]     REPLACE GASTROJEJUNOSTOMY TUBE, PERCUTANEOUS N/A 9/22/2021    Procedure: REPLACEMENT, GASTROJEJUNOSTOMY TUBE, PERCUTANEOUS;  Surgeon: Zackery Montoya MD;  [facility]     REPLACE JEJUNOSTOMY TUBE, PERCUTANEOUS N/A 9/10/2021    Procedure: UPPER ENDOSCOPY, REPLACEMENT OF PERCUTANEOUS GASTROJEJUNOSTOMY TUBE, T-TAG GASTROPEXY;  Surgeon: Zackery Montoya MD;  [facility]     REPLACE JEJUNOSTOMY TUBE, PERCUTANEOUS N/A 12/29/2021    Procedure: REPLACEMENT, JEJUNOSTOMY TUBE, PERCUTANEOUS;  Surgeon: Mandeep Park MD;  [facility]      transphenoidal pituitary resection       RUST  DELIVERY ONLY       RUST  DELIVERY ONLY      repeat c section with incidental cystotomy with repair     RUST EXCIS PITUITARY,TRANSNASAL/SEPTAL      pituitary tumor removed for diabetes insipidus     RUST TOTAL ABDOM HYSTERECTOMY      w/ bilateral salpingoophorectomy       Allergies   Allergen Reactions     Apple Anaphylaxis     Corticosteroids Other (See Comments)     All oral, IV and injectable steroids are contraindicated (unless in life threatening situations) in [facility] [employer] transplant recipients. They can cause irreversible loss of islet cell function. Please contact patient's transplant care coordinator ADI Gaffney RN at [phone]/pager [phone] and/or endocrinologist prior to administration.       Depakote [Divalproex Sodium] Other (See Comments)     Chest pain     Zithromax [Azithromycin Dihydrate] Anaphylaxis     Noted in 08 ER     Bromfenac      Other reaction(s): Headache     Codeine      Other reaction(s): Hallucinations     Darvocet [Propoxyphene N-Apap] Itching     Hydromorphone Other (See Comments)     Loopy     Morphine Nausea and Vomiting and Rash     Nalbuphine Hcl Rash     RASH :nubaine     Zosyn [Piperacillin-Tazobactam In D5w] Rash     Possible allergy, did have a diffuse rash that seemed drug related but could have also been related to soap in the hospital.      Bactrim [Sulfamethoxazole W-Trimethoprim] Other (See Comments) and Nausea and Vomiting     Severely low liver function.     Compazine [Prochlorperazine] Other (See Comments)     Twitching. Takes Benedryl and is fine     Tape [Adhesive Tape] Blisters     Tramadol      Zofran [Ondansetron] Other (See Comments)     migraine     Flagyl [Metronidazole] Hives and Rash      Social History     Tobacco Use     Smoking status: Former Smoker     Packs/day: 0.50     Years: 6.00     Pack years: 3.00     Types: Cigarettes     Start date: 1985     Quit date:  1992     Years since quittin.1     Smokeless tobacco: Never Used     Tobacco comment: no 2nd hand   Substance Use Topics     Alcohol use: Not Currently     Alcohol/week: 3.0 - 6.0 standard drinks     Types: 1 - 2 Glasses of wine, 1 - 2 Cans of beer, 1 - 2 Shots of liquor per week     Comment: none since IGG      Wt Readings from Last 1 Encounters:   22 62 kg (136 lb 11 oz)        Anesthesia Evaluation   Pt has had prior anesthetic. Type: General.    History of anesthetic complications  - PONV.  (responds well to scopolamine patch).    ROS/MED HX  ENT/Pulmonary: Comment: 3 pk yr hx, quit     (+) allergic rhinitis, tobacco use, Past use,  (-) asthma and sleep apnea   Neurologic:     (+) migraines,  (-) no seizures and no CVA   Cardiovascular:     (+) -----Previous cardiac testing   Echo: Date: Results:    Stress Test: Date: Results:    ECG Reviewed: Date: 21 Results:  Sinus rhythm  Cannot rule out Anterior infarct , age undetermined  Abnormal ECG   Ventricular rate 66 bpm     Cath: Date: Results:      METS/Exercise Tolerance: 4 - Raking leaves, gardening Comment: Endorses SOB when carrying laundry upstairs. Denies CP. +Fatigue, less stamina 2/2 malnutrition     Hematologic:  - neg hematologic  ROS  (-) history of blood clots and history of blood transfusion   Musculoskeletal: Comment: lumbago, chrondromalacia, stiff shoulder  - neg musculoskeletal ROS     GI/Hepatic: Comment: Hx hepatic dome lesion/IgG4 pseudotumor and elevated LFT's of unclear etiology    Chronic pancreatitis    Severe gastroparesis    Malnutrition, wt loss  Hiatal hernia      (+) GERD, Asymptomatic on medication, hiatal hernia, cholecystitis/cholelithiasis,     Renal/Genitourinary:  - neg Renal ROS     Endo: Comment: post-pancreatectomy diabetes; pancreatic insufficiency.     (+) thyroid problem, hypothyroidism,  (-) Type II DM   Psychiatric/Substance Use:  - neg psychiatric ROS     Infectious Disease:  - neg infectious  disease ROS     Malignancy:  - neg malignancy ROS (+) Malignancy (pituitary adenoma), History of Neuro.    Other:  - neg other ROS          Physical Exam    Airway        Mallampati: I   TM distance: > 3 FB   Neck ROM: full   Mouth opening: > 3 cm    Respiratory Devices and Support         Dental       (+) chipped    B=Bridge, C=Chipped, L=Loose, M=Missing    Cardiovascular   cardiovascular exam normal          Pulmonary   pulmonary exam normal                OUTSIDE LABS:  CBC:   Lab Results   Component Value Date    WBC 7.5 12/06/2021    WBC 8.1 12/04/2021    HGB 12.3 12/06/2021    HGB 11.6 (L) 12/04/2021    HCT 36.3 12/06/2021    HCT 35.3 12/04/2021     (H) 12/06/2021     12/04/2021     BMP:   Lab Results   Component Value Date     12/07/2021     12/04/2021    POTASSIUM 3.7 12/07/2021    POTASSIUM 3.8 12/06/2021    CHLORIDE 104 12/07/2021    CHLORIDE 103 12/04/2021    CO2 27 12/07/2021    CO2 29 12/04/2021    BUN 5 (L) 12/07/2021    BUN 8 12/04/2021    CR 0.61 12/07/2021    CR 0.43 (L) 12/04/2021     (H) 01/28/2022    GLC 78 12/29/2021     COAGS:   Lab Results   Component Value Date    PTT 29 01/07/2017    INR 1.05 12/04/2021    FIBR 225 12/28/2016     POC:   Lab Results   Component Value Date    BGM 85 08/24/2018    HCG Negative 12/19/2016     HEPATIC:   Lab Results   Component Value Date    ALBUMIN 3.0 (L) 12/03/2021    PROTTOTAL 7.1 12/03/2021    ALT 19 12/03/2021    AST 11 12/03/2021    ALKPHOS 104 12/03/2021    BILITOTAL 0.5 12/03/2021     OTHER:   Lab Results   Component Value Date    PH 7.49 (H) 12/28/2016    LACT 0.9 12/03/2021    A1C 5.2 11/27/2021    KASSI 8.8 12/07/2021    PHOS 3.5 12/07/2021    MAG 2.1 12/07/2021    LIPASE <10 (L) 12/03/2021    AMYLASE 45 01/23/2017    TSH 0.67 09/16/2021    T4 1.13 03/23/2018    CRP 90.0 (H) 12/29/2016    SED 10 09/16/2021       Anesthesia Plan    ASA Status:  3   NPO Status:  NPO Appropriate    Anesthesia Type: General.     - Airway:  ETT   Induction: Intravenous.   Maintenance: TIVA.        Consents    Anesthesia Plan(s) and associated risks, benefits, and realistic alternatives discussed. Questions answered and patient/representative(s) expressed understanding.    - Discussed:     - Discussed with:  Patient      - Extended Intubation/Ventilatory Support Discussed: No.      - Patient is DNR/DNI Status: No    Use of blood products discussed: No .     Postoperative Care    Pain management: IV analgesics.   PONV prophylaxis: Background Propofol Infusion, Ondansetron (or other 5HT-3)     Comments:    Other Comments: Long hx of nausea, but many allergies to medications.  No Steroids please  Zofran 4 mg IV for nausea  Fentanyl for opioid if needed       H&P reviewed: Unable to attach H&P to encounter due to EHR limitations. H&P Update: appropriate H&P reviewed, patient examined. No interval changes since H&P (within 30 days).         Thaddeus Lynn MD

## 2022-02-03 NOTE — ANESTHESIA CARE TRANSFER NOTE
Patient: Dinora Mcghee    Procedure: Procedure(s):  ESOPHAGOGASTRODUODENOSCOPY (EGD)  ENTEROSCOPY with possible fistula closure       Diagnosis: Gastroparesis [K31.84]  Diagnosis Additional Information: No value filed.    Anesthesia Type:   General     Note:    Oropharynx: oropharynx clear of all foreign objects and spontaneously breathing  Level of Consciousness: drowsy  Oxygen Supplementation: face mask  Level of Supplemental Oxygen (L/min / FiO2): 5  Independent Airway: airway patency satisfactory and stable  Dentition: dentition unchanged  Vital Signs Stable: post-procedure vital signs reviewed and stable  Report to RN Given: handoff report given  Patient transferred to: PACU    Handoff Report: Identifed the Patient, Identified the Reponsible Provider, Reviewed the pertinent medical history, Discussed the surgical course, Reviewed Intra-OP anesthesia mangement and issues during anesthesia, Set expectations for post-procedure period and Allowed opportunity for questions and acknowledgement of understanding      Vitals:  Vitals Value Taken Time   BP     Temp     Pulse     Resp     SpO2         Electronically Signed By: NATALY Mejia CRNA  February 3, 2022  2:26 PM

## 2022-02-03 NOTE — ANESTHESIA PROCEDURE NOTES
Airway       Patient location during procedure: OR       Procedure Start/Stop Times: 2/3/2022 1:57 PM  Staff -        Anesthesiologist:  Thaddeus Lynn MD       CRNA: Shannan Alvarez APRN CRNA       Performed By: CRNA  Consent for Airway        Urgency: elective  Indications and Patient Condition       Indications for airway management: andrez-procedural       Induction type:intravenous       Mask difficulty assessment: 1 - vent by mask    Final Airway Details       Final airway type: endotracheal airway       Successful airway: ETT - single  Endotracheal Airway Details        ETT size (mm): 6.5       Cuffed: yes       Successful intubation technique: direct laryngoscopy       DL Blade Type: Pino 2       Grade View of Cords: 1       Adjucts: stylet       Position: Left       Measured from: lips       Secured at (cm): 22       Bite block used: None    Post intubation assessment        Placement verified by: capnometry, equal breath sounds and chest rise        Number of attempts at approach: 1       Secured with: pink tape       Ease of procedure: easy       Dentition: Intact and Unchanged

## 2022-02-03 NOTE — OR NURSING
Patient back in Endo after recovery in PAR.  C/O slight nausea.  Given peppermint and alcohol to smell.  Zofran repeated.  Feeling better upon discharge.

## 2022-02-07 NOTE — H&P
[facility]  GASTROENTEROLOGY H&P NOTE  Dinora Mcghee [id number]   2022    HPI  See H&P from 21  57yo woman post TPIAT with course complicated by gastroparesis s/p direct-G tube and direct-J tube. Direct J-tube created significant problems and was subsequently removed on 22. She then had leaking from the prior PEJ site. However, this has since 'dried' up without further drainage for the past couple of days. Plan for Botox injection into the pylorus as well as assess PEJ site. Symptomatic gastroparesis symptoms with nausea, vomiting, and bloating.    Past Medical History:   Diagnosis Date     Allergic rhinitis, cause unspecified      Allergy to other foods     strawberries, apples, celeries, alice, watermelon     Arthritis     left knee     Choledocholithiasis     long after cholecystectomy     Chronic abdominal pain      Chronic constipation      Chronic nausea      Chronic pancreatitis (H)      Degeneration of lumbar or lumbosacral intervertebral disc      Esophageal reflux     w/ hiatal hernia     Gastroparesis      Hiatal hernia      History of pituitary adenoma     s/p resection     Hypothyroidism      Migraines      Mild hyperlipidemia      On tube feeding diet     presence of GJ tube     Pancreatic disease     PD stricture, 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 endometriosis growth     APPENDECTOMY        SECTION       COLONOSCOPY       ENDOSCOPIC INSERTION TUBE GASTROSTOMY N/A 2021    Procedure: Gastrojejonostomy placement with jejunopexy, PEG tube exchange;  Surgeon: Zackery Montoya MD;  [facility]     ENDOSCOPIC RETROGRADE CHOLANGIOPANCREATOGRAM N/A 2015    Procedure: ENDOSCOPIC RETROGRADE  CHOLANGIOPANCREATOGRAM;  Surgeon: Mandeep Park MD;  [facility]     ENDOSCOPIC RETROGRADE CHOLANGIOPANCREATOGRAM N/A 2/9/2016    Procedure: COMBINED ENDOSCOPIC RETROGRADE CHOLANGIOPANCREATOGRAPHY, PLACE TUBE/STENT;  Surgeon: Mandeep Park MD;  [facility]     ENDOSCOPIC RETROGRADE CHOLANGIOPANCREATOGRAM N/A 3/17/2016    Procedure: COMBINED ENDOSCOPIC RETROGRADE CHOLANGIOPANCREATOGRAPHY, REMOVE FOREIGN BODY OR STENT/TUBE;  Surgeon: Mandeep Park MD;  [facility]     ENDOSCOPIC RETROGRADE CHOLANGIOPANCREATOGRAM N/A 8/2/2016    Procedure: COMBINED ENDOSCOPIC RETROGRADE CHOLANGIOPANCREATOGRAPHY, PLACE TUBE/STENT;  Surgeon: Mandeep Park MD;  [facility]     ENDOSCOPIC RETROGRADE CHOLANGIOPANCREATOGRAM N/A 8/26/2016    Procedure: COMBINED ENDOSCOPIC RETROGRADE CHOLANGIOPANCREATOGRAPHY, REMOVE FOREIGN BODY OR STENT/TUBE;  Surgeon: Mandeep Park MD;  [facility]     ENDOSCOPIC ULTRASOUND UPPER GASTROINTESTINAL TRACT (GI) N/A 10/3/2016    Procedure: ENDOSCOPIC ULTRASOUND, ESOPHAGOSCOPY / UPPER GASTROINTESTINAL TRACT (GI);  Surgeon: Guru Jose Rodas MD;  [facility]     ENTEROSCOPY SMALL BOWEL N/A 2/3/2022    Procedure: ENTEROSCOPY with possible fistula closure;  Surgeon: Francisco Dodson MD;  Location:  GI     ESOPHAGOSCOPY, GASTROSCOPY, DUODENOSCOPY (EGD), COMBINED N/A 6/24/2015    Procedure: COMBINED ESOPHAGOSCOPY, GASTROSCOPY, DUODENOSCOPY (EGD), REMOVE FOREIGN BODY;  Surgeon: Mandeep Park MD;  Location:  GI     ESOPHAGOSCOPY, GASTROSCOPY, DUODENOSCOPY (EGD), COMBINED N/A 10/25/2015    Procedure: COMBINED ESOPHAGOSCOPY, GASTROSCOPY, DUODENOSCOPY (EGD);  Surgeon: Sammy Amaro MD;  Location:  GI     ESOPHAGOSCOPY, GASTROSCOPY, DUODENOSCOPY (EGD), COMBINED N/A 10/25/2015    Procedure: COMBINED ESOPHAGOSCOPY, GASTROSCOPY, DUODENOSCOPY (EGD), BIOPSY SINGLE OR MULTIPLE;  Surgeon: Sammy Amaro MD;  [facility]     ESOPHAGOSCOPY, GASTROSCOPY, DUODENOSCOPY (EGD), DILATATION, COMBINED       EXCISE LESION TRUNK N/A 4/17/2017    Procedure: EXCISE LESION TRUNK;  Removal of Abdominal Foreign Body;  Surgeon: Nestor Phoenix MD;  [facility]     HC ESOPH/GAS REFLUX TEST W NASAL IMPED >1 HR N/A 11/19/2015    Procedure: ESOPHAGEAL IMPEDENCE FUNCTION TEST WITH 24 HOUR PH GREATER THAN 1 HOUR;  Surgeon: Tihago Apple MD;  [facility] GI     HC UGI ENDOSCOPY DIAG W BIOPSY  9/17/08     HC UGI ENDOSCOPY DIAG W BIOPSY  9/27/12     HC UGI ENDOSCOPY W ESOPHAGEAL DILATION BALLOON <30MM  9/17/08     HC UGI ENDOSCOPY W EUS N/A 5/5/2015    Procedure: COMBINED ENDOSCOPIC ULTRASOUND, ESOPHAGOSCOPY, GASTROSCOPY, DUODENOSCOPY (EGD);  Surgeon: Wm Dueñas MD;  [facility]     HC WRIST ARTHROSCOP,RELEASE XVERS LIG Bilateral 12/17/08     INJECT TRANSVERSUS ABDOMINIS PLANE (TAP) BLOCK BILATERAL Left 9/22/2016    Procedure: INJECT TRANSVERSUS ABDOMINIS PLANE (TAP) BLOCK BILATERAL;  Surgeon: Dickson Corrigan MD;  [facility]     laparoscopic pineda  1995     LAPAROSCOPIC HERNIORRHAPHY INCISIONAL N/A 8/23/2018    Procedure: LAPAROSCOPIC HERNIORRHAPHY INCISIONAL;  Laparoscopic Incisional Hernia Repair with Symbotex Mesh Implant;  Surgeon: Nestor Phoenix MD;  [facility]     LAPAROSCOPIC PANCREATECTOMY, TRANSPLANT AUTO ISLET CELL N/A 12/28/2016    Procedure: LAPAROSCOPIC PANCREATECTOMY, TRANSPLANT AUTO ISLET CELL;  Surgeon: Nestor Phoenix MD;  [facility]     REMOVE GASTROSTOMY TUBE ADULT N/A 1/28/2022    Procedure: REMOVAL, GASTROSTOMY TUBE, ADULT;  Surgeon: Mandeep Park MD;  [facility]     REPLACE GASTROJEJUNOSTOMY TUBE, PERCUTANEOUS N/A 9/7/2021    Procedure: GASTROJEJUNOSTOMY TUBE PLACEMENT, PERCUTANEOUS, WITH GASTROPEXY;  Surgeon: Mandeep Park MD;  [facility]     REPLACE GASTROJEJUNOSTOMY TUBE, PERCUTANEOUS N/A 9/22/2021    Procedure: REPLACEMENT, GASTROJEJUNOSTOMY TUBE,  PERCUTANEOUS;  Surgeon: Zackery Montoya MD;  [facility]     REPLACE JEJUNOSTOMY TUBE, PERCUTANEOUS N/A 9/10/2021    Procedure: UPPER ENDOSCOPY, REPLACEMENT OF PERCUTANEOUS GASTROJEJUNOSTOMY TUBE, T-TAG GASTROPEXY;  Surgeon: Zackery Montoya MD;  [facility]     REPLACE JEJUNOSTOMY TUBE, PERCUTANEOUS N/A 2021    Procedure: REPLACEMENT, JEJUNOSTOMY TUBE, PERCUTANEOUS;  Surgeon: Mandeep Park MD;  [facility]     transphenoidal pituitary resection       [facility]  DELIVERY ONLY       [facility]  DELIVERY ONLY      repeat c section with incidental cystotomy with repair     Z EXCIS PITUITARY,TRANSNASAL/SEPTAL      pituitary tumor removed for diabetes insipidus     Z TOTAL ABDOM HYSTERECTOMY      w/ bilateral salpingoophorectomy           Allergies   Allergen Reactions     Apple Anaphylaxis     Corticosteroids Other (See Comments)     All oral, IV and injectable steroids are contraindicated (unless in life threatening situations) in [facility] transplant recipients. They can cause irreversible loss of islet cell function. Please contact patient's transplant care coordinator ADI Gaffney RN at [phone]/pager [phone] and/or endocrinologist prior to administration.       Depakote [Divalproex Sodium] Other (See Comments)     Chest pain     Zithromax [Azithromycin Dihydrate] Anaphylaxis     Noted in 08 ER     Bromfenac      Other reaction(s): Headache     Codeine      Other reaction(s): Hallucinations     Darvocet [Propoxyphene N-Apap] Itching     Hydromorphone Other (See Comments)     Loopy     Morphine Nausea and Vomiting and Rash     Nalbuphine Hcl Rash     RASH :nubaine     Zosyn [Piperacillin-Tazobactam In D5w] Rash     Possible allergy, did have a diffuse rash that seemed drug related but could have also been related to soap in the hospital.      Bactrim [Sulfamethoxazole W-Trimethoprim] Other (See Comments) and Nausea and Vomiting      Severely low liver function.     Compazine [Prochlorperazine] Other (See Comments)     Twitching. Takes Benedryl and is fine     Tape [Adhesive Tape] Blisters     Tramadol      Zofran [Ondansetron] Other (See Comments)     migraine     Flagyl [Metronidazole] Hives and Rash       No current facility-administered medications for this encounter.    Current Outpatient Medications:      amitriptyline (ELAVIL) 10 MG tablet, Take 10 mg by mouth At Bedtime Take with a 50mg tab for a total of 60mg, Disp: , Rfl:      amitriptyline (ELAVIL) 50 MG tablet, Take 1 tablet (50 mg) by mouth At Bedtime, Disp: 90 tablet, Rfl: 3     amylase-lipase-protease (CREON 24) 96146-94463 units CPEP per EC capsule, Take 1-2 capsules by mouth Take with snacks or supplements, Disp: , Rfl:      amylase-lipase-protease (CREON) 72288-49863 units CPEP per EC capsule, Take 3-4 capsules by mouth 3 times daily (with meals) With oral meals, Disp: 150 capsule, Rfl: 11     cetirizine (ZYRTEC) 10 MG tablet, Take 1 tablet (10 mg) by mouth daily (Patient taking differently: Take 10 mg by mouth every morning ), Disp: 90 tablet, Rfl: 3     cyclobenzaprine (FLEXERIL) 10 MG tablet, Take 1 tablet (10 mg) by mouth 2 times daily as needed for muscle spasms, Disp: 60 tablet, Rfl: 3     diphenhydrAMINE (BENADRYL ALLERGY) 25 MG capsule, Take 1 capsule (25 mg) by mouth every 6 hours as needed for itching or allergies, Disp: 56 capsule, Rfl: 0     estradiol (VAGIFEM) 10 MCG TABS vaginal tablet, INSERT 1 TABLET(10 MCG) VAGINALLY 2 TIMES A WEEK, Disp: 24 tablet, Rfl: 2     famotidine (PEPCID) 20 MG tablet, Take 1 tablet (20 mg) by mouth 2 times daily (Patient taking differently: Take 20 mg by mouth every morning ), Disp: 180 tablet, Rfl: 3     levothyroxine (SYNTHROID/LEVOTHROID) 137 MCG tablet, Take 1 tablet (137 mcg) by mouth daily, Disp: 90 tablet, Rfl: 3     montelukast (SINGULAIR) 10 MG tablet, TAKE 1 TABLET(10 MG) BY MOUTH AT BEDTIME, Disp: 90 tablet, Rfl: 3      omeprazole (PRILOSEC) 40 MG DR capsule, Take 1 capsule (40 mg) by mouth 2 times daily (Patient taking differently: Take 40 mg by mouth every evening ), Disp: 180 capsule, Rfl: 3     oxyCODONE-acetaminophen (PERCOCET) 5-325 MG tablet, Take 1 tablet by mouth every 6 hours as needed for pain, Disp: 30 tablet, Rfl: 0     polyethylene glycol (MIRALAX) 17 GM/Dose powder, Take 17 g (1 capful) by mouth daily (Patient taking differently: Take 1 capful by mouth every morning ), Disp: 507 g, Rfl: 11     prochlorperazine (COMPAZINE) 10 MG tablet, Take 0.5 tablets (5 mg) by mouth every 6 hours as needed for nausea or vomiting, Disp: 60 tablet, Rfl: 1     prochlorperazine (COMPAZINE) 5 MG tablet, Take 1 tablet (5 mg) by mouth every 6 hours as needed Take two 5 mg tablets by mouth every six hours as needed for nausea., Disp: 60 tablet, Rfl: 1     promethazine (PHENERGAN) 25 MG tablet, Take 1 tablet (25 mg) by mouth daily as needed, Disp: 60 tablet, Rfl: 1     Prucalopride Succinate (MOTEGRITY) 2 MG TABS, Take 2 mg by mouth daily (Patient taking differently: Take 2 mg by mouth every morning ), Disp: 30 tablet, Rfl: 11     scopolamine (TRANSDERM) 1 MG/3DAYS 72 hr patch, Place 1 patch onto the skin every 72 hours, Disp: 10 patch, Rfl: 11     senna-docusate (SENOKOT-S;PERICOLACE) 8.6-50 MG per tablet, Take 1-2 tablets by mouth 2 times daily (Patient taking differently: Take 1-2 tablets by mouth every morning ), Disp: 100 tablet, Rfl: 0     ZOLMitriptan (ZOMIG) 5 MG tablet, Take 1 tablet (5 mg) by mouth at onset of headache for migraine, Disp: 9 tablet, Rfl: 11     azelastine (ASTELIN) 0.1 % nasal spray, Spray 1 spray into both nostrils 2 times daily, Disp: 1 Bottle, Rfl: 11     blood glucose (PAT CONTOUR NEXT) test strip, Use to test blood sugar 6 times daily or as directed., Disp: 2 Box, Rfl: 11     blood glucose monitoring (PAT MICROLET) lancets, Use to test blood sugar 6-8 times daily or as directed., Disp: 100 each, Rfl: 11      Continuous Blood Gluc Sensor (FREESTYLE LISA 2 SENSOR) MISC, 1 each every 14 days, Disp: 2 each, Rfl: 11     Glucagon (GVOKE HYPOPEN 1-PACK) 1 MG/0.2ML SOAJ, Inject 1 mg Subcutaneous once as needed (hypoglycemia with loss of consciousness), Disp: 0.2 mL, Rfl: 1     glucose 40 % GEL gel, Take 15-30 g by mouth every 15 minutes as needed for low blood sugar, Disp: 3 Tube, Rfl: 2     Hydroactive Dressings (TEGADERM HYDROCOLLOID THIN) MISC, Use under sensor site , change every 14 days, Disp: 2 each, Rfl: 11     HYDROmorphone, STANDARD CONC, (DILAUDID) 1 MG/ML oral solution, Take 1-2 mLs (1-2 mg) by mouth every 4 hours as needed for moderate to severe pain (Post G and J tube placement), Disp: 168 mL, Rfl: 0     ibuprofen (ADVIL/MOTRIN) 400 MG tablet, Take 1 tablet (400 mg) by mouth every 6 hours as needed for moderate pain, Disp: 30 tablet, Rfl: 0     Lidocaine (LIDOCARE) 4 % Patch, Place 1 patch onto the skin every 24 hours To prevent lidocaine toxicity, patient should be patch free for 12 hrs daily., Disp: 10 patch, Rfl: 0     methocarbamol (ROBAXIN) 750 MG tablet, , Disp: , Rfl:      Nutritional Supplements (BOOST HIGH PROTEIN) LIQD, After above baseline labs are drawn, give: 6 mL/kg to maximum of 360 mL; the beverage is to be consumed within 5 minutes., Disp: , Rfl: 0     order for DME, Equipment being ordered:Cefaly, Disp: 1 Device, Rfl: 0     Sharps Container (BD SHARPS ) MISC, 1 Container as needed, Disp: 1 each, Rfl: 1    Family History   Problem Relation Age of Onset     Eye Disorder Father         cataract, detached retina     Myocardial Infarction Father 60     Lipids Father      Cerebrovascular Disease Father      Depression Father      Substance Abuse Father      Anesthesia Reaction Father         stroke right after surgery     Cataracts Father      Osteoarthritis Father      Ulcerative Colitis Father      Lipids Mother      Hypertension Mother      Thyroid Disease Mother      Depression Mother       Angina Mother      GERD Mother      Skin Cancer Mother      Eye Disorder Son         ptosis     Eye Disorder Paternal Grandmother         cataract     Eye Disorder Paternal Grandfather         cataract     Diabetes Paternal Grandfather      Eye Disorder Maternal Grandmother         cataract     Thyroid Disease Maternal Grandmother      Coronary Artery Disease Sister         angioplasty     GERD Sister      Substance Abuse Sister      Depression Sister      Depression Son      Anxiety Disorder Son      Thyroid Disease Sister      Diabetes Maternal Grandfather      Depression Nephew      Anxiety Disorder Nephew      Thyroid Disease Nephew      Diabetes Type 2  Cousin         paternal cousin     Heart Disease Paternal Aunt      Diabetes Paternal Aunt      Diabetes Paternal Uncle      Heart Disease Paternal Uncle        Social History     Socioeconomic History     Marital status:      Spouse name: Norris     Number of children: 3     Years of education: 16     Highest education level: Not on file   Occupational History     Occupation: director     Employer: [employer]   Tobacco Use     Smoking status: Former Smoker     Packs/day: 0.50     Years: 6.00     Pack years: 3.00     Types: Cigarettes     Start date: 1985     Quit date: 1992     Years since quittin.1     Smokeless tobacco: Never Used     Tobacco comment: no 2nd hand   Substance and Sexual Activity     Alcohol use: Not Currently     Alcohol/week: 3.0 - 6.0 standard drinks     Types: 1 - 2 Glasses of wine, 1 - 2 Cans of beer, 1 - 2 Shots of liquor per week     Comment: none since IGG     Drug use: No     Sexual activity: Yes     Partners: Male     Birth control/protection: None     Comment: Hystectomy    Other Topics Concern     Parent/sibling w/ CABG, MI or angioplasty before 65F 55M? No      Service Not Asked     Blood Transfusions No     Caffeine Concern Not Asked     Occupational Exposure Not Asked     Hobby Hazards Not Asked      Sleep Concern Not Asked     Stress Concern Not Asked     Weight Concern Not Asked     Special Diet Not Asked     Back Care Not Asked     Exercise Not Asked     Bike Helmet Not Asked     Seat Belt Yes     Self-Exams Not Asked   Social History Narrative     with 3 children and a dog.  No smoking, etoh or drug use.  Worked as a  for [employer] in [place] in the past.  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     Social Determinants of Health     Financial Resource Strain: Not on file   Food Insecurity: Not on file   Transportation Needs: Not on file   Physical Activity: Not on file   Stress: Not on file   Social Connections: Not on file   Intimate Partner Violence: Not on file   Housing Stability: Not on file       Review Of Systems  Skin: negative  Eyes: negative  Ears/Nose/Throat: negative  Respiratory: No shortness of breath, dyspnea on exertion, cough, or hemoptysis  Cardiovascular: negative  Gastrointestinal: as above  Genitourinary: negative  Musculoskeletal: negative  Neurologic: negative  Psychiatric: negative  Hematologic/Lymphatic/Immunologic: negative  Endocrine: negative      OBJECTIVE:  VS: /72   Pulse 83   Temp 98.3  F (36.8  C) (Temporal)   Resp 15   SpO2 100%    GEN: A&Ox3, NAD, comfortable  CV:  RRR, no M/G/R  PULM:  CTA B/L  ABD: Normoactive bowel sounds, soft, ND, NT, no HSM  SKIN: no lesions  EXT: no LE edema  NEURO: nonfocal    REVIEW OF LABORATORY, PATHOLOGY AND IMAGING RESULTS:  BMPNo lab results found in last 7 days.    CBCNo lab results found in last 7 days.    INRNo lab results found in last 7 days.    LFTsNo lab results found in last 7 days.     PANCNo lab results found in last 7 days.    IMPRESSION:  Dinora Mcghee is a 56 year old female statuspost TPIAT with course complicated by gastroparesis s/p direct-G tube and direct-J tube. Here for EGD with botox injection and enteroscopy.    Francisco Dodson MD  [facility]  [facility]  Division of Gastroenterology and Hepatology  [facility] [address] [place], [place] [address]

## 2022-02-08 ENCOUNTER — PATIENT OUTREACH (OUTPATIENT)
Dept: GASTROENTEROLOGY | Facility: CLINIC | Age: 57
End: 2022-02-08
Payer: COMMERCIAL

## 2022-02-08 NOTE — TELEPHONE ENCOUNTER
Per Dr. Park request, called to check in on patient. Per patient, she's eating ok, has not lost weight. Will f/ up about provider about antidepressant, has almost no pain after jtube removal. No improvement in nausea yet. Gtube doing well, discussed venting as needed. Will continue with acupuncture. Has returned to work. Encouraged f/ up with pain clinic as scheduled even though patient not having daily pain any longer, explained contact with them good plan long term. Reviewed follow up appointments, pt understands plan.      ML

## 2022-02-09 ENCOUNTER — VIRTUAL VISIT (OUTPATIENT)
Dept: PSYCHOLOGY | Facility: CLINIC | Age: 57
End: 2022-02-09
Payer: COMMERCIAL

## 2022-02-09 DIAGNOSIS — F43.23 ADJUSTMENT DISORDER WITH MIXED ANXIETY AND DEPRESSED MOOD: Primary | ICD-10-CM

## 2022-02-09 DIAGNOSIS — R11.0 NAUSEA: ICD-10-CM

## 2022-02-09 PROCEDURE — 90837 PSYTX W PT 60 MINUTES: CPT | Mod: 95 | Performed by: STUDENT IN AN ORGANIZED HEALTH CARE EDUCATION/TRAINING PROGRAM

## 2022-02-09 NOTE — PROGRESS NOTES
"  Health Psychology                                                                                                                          Sweta Michelle, Ph.D., L.P [phone]  Melva Beebe, Ph.D., L.P. [phone]  Rachel Sloan, Ph.D. [phone]  Kenya Sanchez, Ph.D., L.P. [phone]  Estrada Patel, Ph.D., A.B.P.P., L.P. [phone]         Enedina Joaquin, Ph.D., L.P. [phone]                Critical access hospital and [facility], 3rd Floor  [address] [place] [address]     Health Psychology Follow-up Visit - Telehealth Visit     Dinora Mcghee is a 56 year old female who is being evaluated via a billable video visit.       The patient has been notified of following:      \"This video visit will be conducted via a video visit between you and your physician/provider. We have found that certain health care needs can be provided without the need for an in-person physical exam.  This service lets us provide the care you need with a video conversation.  If a prescription is necessary we can send it directly to your pharmacy.  If lab work is needed we can place an order for that and you can then stop by our lab to have the test done at a later time.     Video visits are billed at different rates depending on your insurance coverage.  Please reach out to your insurance provider with any questions.     If during the course of the call the physician/provider feels a video visit is not appropriate, you will not be charged for this service.\"     Patient has given verbal consent for Video visit? Yes  Will anyone else be joining your video visit? No    Date of Service:  2/9/22    SUBJECTIVE:  Dinora Mcghee was seen for individual psychotherapy. Reviewed emotional and physical health since our last session.    Symptoms reported: Pain much improved after J-tube removed. Weight stable and has gained 2 pounds. Mood improved, feeling more positive and hopeful yet acknowledging the " long-term nature of her recovery path.     Most prominent and concerning symptoms are constipation and left-sided pain which providers think may be related to nerve pain. Taking Cymbalta. Ongoing nausea, limited appetite. Increased leaking today out of j-tube. Has not continued to lose weight but not gaining.     Progress towards goals: Meets with pain provider soon. Eating as able. Engaging in regular physical activity and increasing activity. Continuing to set appropriate boundaries with others and connect with social supports.     Interventions utilized: Reviewed experiences in interim and improvement in pain. Reflected on past 1.5 years dealing with health issues.  Discussed positive takeaways she has had. Discussed how she is viewing the future challenges with her health and coping skills she can continue to use.  Referenced information sent in [employer] message after last visit and what can be most useful in the future. Made plan for Dinora to continue managing emotional and physical health independently and if she should need additional support she will reach out in the future.     Ms. Mcghee was engaged throughout the visit and expressed understanding of information provided.    OBJECTIVE:  Ms. Mcghee was available for scheduled visit.  She reported improved mood, frustrated at times with j-tube difficulties..  Affect was mood- and thought-congruent. Insight and judgment good.  Thought processes logical and linear. Speech WNL.  Denied suicidal ideation.     ASSESSMENT:  Psychological Assessment:  The PHQ-9 is an instrument for screening, diagnosing, monitoring and measuring the severity of depression. Scores of 5, 10, 15, and 20 represent cutpoints for mild, moderate, moderately severe and severe depression, respectively.   PHQ-9 SCORE 7/1/2019 6/21/2021 7/8/2021   PHQ-9 Total Score [facility] 3 (Minimal depression) 13 (Moderate depression) 13 (Moderate depression)   PHQ-9 Total Score 3 13 13       The DARCY-7 is  an instrument for screening, diagnosing, monitoring and measuring the severity of anxiety. Scores of 5, 10, and 15 represent cutpoints for mild, moderate, and severe anxiety, respectively.  DARCY-7 SCORE 1/11/2018 1/22/2018 6/21/2021   Total Score 2 (minimal anxiety) - 0 (minimal anxiety)   Total Score 2 2 0     Therapy appropriate for Dinora at this time.    DIAGNOSIS:  Adjustment disorder with mixed anxiety and depressed mood.     PLAN:  As Dinora is feeling better emotionally and physically she feels more confident to navigate ongoing health goals.  We will not schedule a follow-up at this time but Dinora will reach out if she wants to schedule again.      Type of service: Telemedicine Psychotherapy for coping with health conditions and symptoms of depression and anxiety  Time of service:   ? Date: 2/9/22  ? Time Service Began: 10:00  ? Time Service Ended: 10:58  Reason that telemedicine is appropriate: Public health regulations due to COVID-19 pandemic/state of emergency  Mode of transmission: Secure real time interactive audio and visual telecommunication system via [email or web address]  Location of originating and distant sites:  ? Originating site (patient location): Patient's home  ? Distant site (provider site): Provider's office    Rachel Sloan, PhD, LP  Clinical Health Psychologist    Tx plan completed: 7/8/21  Tx plan due:  7/8/22

## 2022-02-10 RX ORDER — PROCHLORPERAZINE MALEATE 10 MG
5 TABLET ORAL EVERY 6 HOURS PRN
Qty: 60 TABLET | Refills: 2 | Status: SHIPPED | OUTPATIENT
Start: 2022-02-10 | End: 2022-05-06

## 2022-02-10 NOTE — TELEPHONE ENCOUNTER
prochlorperazine (COMPAZINE) 10 MG tablet  Last Written Prescription Date:   12/13/2021  Last Fill Quantity: 60,   # refills: 1  Last Office Visit :  1/17/2022  Future Office visit: None  60 Tabs, 2 Refills sent to pharm 2/10/2022      Gina Delgadillo RN  Central Triage Red Flags/Med Refills      Warnings Override History for prochlorperazine (COMPAZINE) 10 MG tablet [[id number]]    Overridden by Francisco Dodson MD on Feb 3, 2022 2:34 PM   Allergy/Contraindication   1. PROCHLORPERAZINE [Level: Ingredient Match] [Reason: Tolerated medication/side effects in past]      Overridden by Ashley Mane RN on Dec 13, 2021 8:43 AM   Allergy/Contraindication   1. PROCHLORPERAZINE [Level: Ingredient Match] [Reason: Benefit outweighs risk]

## 2022-02-17 ENCOUNTER — MEDICAL CORRESPONDENCE (OUTPATIENT)
Dept: HEALTH INFORMATION MANAGEMENT | Facility: CLINIC | Age: 57
End: 2022-02-17

## 2022-02-17 ENCOUNTER — OFFICE VISIT (OUTPATIENT)
Dept: TRANSPLANT | Facility: CLINIC | Age: 57
End: 2022-02-17
Attending: PEDIATRICS
Payer: COMMERCIAL

## 2022-02-17 ENCOUNTER — LAB (OUTPATIENT)
Dept: LAB | Facility: CLINIC | Age: 57
End: 2022-02-17
Payer: COMMERCIAL

## 2022-02-17 VITALS
SYSTOLIC BLOOD PRESSURE: 114 MMHG | DIASTOLIC BLOOD PRESSURE: 73 MMHG | OXYGEN SATURATION: 100 % | WEIGHT: 136 LBS | BODY MASS INDEX: 21.3 KG/M2 | HEART RATE: 100 BPM

## 2022-02-17 VITALS — BODY MASS INDEX: 21.37 KG/M2 | WEIGHT: 136.47 LBS

## 2022-02-17 DIAGNOSIS — E13.9 POST-PANCREATECTOMY DIABETES (H): ICD-10-CM

## 2022-02-17 DIAGNOSIS — E89.1 POST-PANCREATECTOMY DIABETES (H): Primary | ICD-10-CM

## 2022-02-17 DIAGNOSIS — Z90.410 POST-PANCREATECTOMY DIABETES (H): Primary | ICD-10-CM

## 2022-02-17 DIAGNOSIS — K90.9 MALABSORPTION: ICD-10-CM

## 2022-02-17 DIAGNOSIS — K86.89 PANCREATIC INSUFFICIENCY: ICD-10-CM

## 2022-02-17 DIAGNOSIS — E13.9 POST-PANCREATECTOMY DIABETES (H): Primary | ICD-10-CM

## 2022-02-17 DIAGNOSIS — Z90.410 POST-PANCREATECTOMY DIABETES (H): ICD-10-CM

## 2022-02-17 DIAGNOSIS — E89.1 POST-PANCREATECTOMY DIABETES (H): ICD-10-CM

## 2022-02-17 LAB
ALBUMIN SERPL-MCNC: 4.2 G/DL (ref 3.4–5)
ALP SERPL-CCNC: 98 U/L (ref 40–150)
ALT SERPL W P-5'-P-CCNC: 31 U/L (ref 0–50)
ANION GAP SERPL CALCULATED.3IONS-SCNC: 7 MMOL/L (ref 3–14)
AST SERPL W P-5'-P-CCNC: 23 U/L (ref 0–45)
BASOPHILS # BLD AUTO: 0.1 10E3/UL (ref 0–0.2)
BASOPHILS NFR BLD AUTO: 2 %
BILIRUB SERPL-MCNC: 0.9 MG/DL (ref 0.2–1.3)
BUN SERPL-MCNC: 15 MG/DL (ref 7–30)
CALCIUM SERPL-MCNC: 9.6 MG/DL (ref 8.5–10.1)
CHLORIDE BLD-SCNC: 102 MMOL/L (ref 94–109)
CO2 SERPL-SCNC: 29 MMOL/L (ref 20–32)
CREAT SERPL-MCNC: 0.71 MG/DL (ref 0.52–1.04)
EOSINOPHIL # BLD AUTO: 0.1 10E3/UL (ref 0–0.7)
EOSINOPHIL NFR BLD AUTO: 1 %
ERYTHROCYTE [DISTWIDTH] IN BLOOD BY AUTOMATED COUNT: 13.4 % (ref 10–15)
FASTING STATUS PATIENT QL REPORTED: ABNORMAL
FASTING STATUS PATIENT QL REPORTED: ABNORMAL
FASTING STATUS PATIENT QL REPORTED: YES
FERRITIN SERPL-MCNC: 236 NG/ML (ref 8–252)
GFR SERPL CREATININE-BSD FRML MDRD: >90 ML/MIN/1.73M2
GLUCOSE BLD-MCNC: 132 MG/DL (ref 70–99)
GLUCOSE BLD-MCNC: 132 MG/DL (ref 70–99)
GLUCOSE BLD-MCNC: 137 MG/DL (ref 70–99)
GLUCOSE BLD-MCNC: 174 MG/DL (ref 70–99)
HBA1C MFR BLD: 5.4 % (ref 0–5.6)
HCT VFR BLD AUTO: 40 % (ref 35–47)
HGB BLD-MCNC: 13.1 G/DL (ref 11.7–15.7)
IMM GRANULOCYTES # BLD: 0 10E3/UL
IMM GRANULOCYTES NFR BLD: 0 %
IRON SATN MFR SERPL: 34 % (ref 15–46)
IRON SERPL-MCNC: 91 UG/DL (ref 35–180)
LYMPHOCYTES # BLD AUTO: 1.7 10E3/UL (ref 0.8–5.3)
LYMPHOCYTES NFR BLD AUTO: 34 %
MCH RBC QN AUTO: 31.4 PG (ref 26.5–33)
MCHC RBC AUTO-ENTMCNC: 32.8 G/DL (ref 31.5–36.5)
MCV RBC AUTO: 96 FL (ref 78–100)
MONOCYTES # BLD AUTO: 0.5 10E3/UL (ref 0–1.3)
MONOCYTES NFR BLD AUTO: 9 %
NEUTROPHILS # BLD AUTO: 2.7 10E3/UL (ref 1.6–8.3)
NEUTROPHILS NFR BLD AUTO: 54 %
NRBC # BLD AUTO: 0 10E3/UL
NRBC BLD AUTO-RTO: 0 /100
PLATELET # BLD AUTO: 389 10E3/UL (ref 150–450)
POTASSIUM BLD-SCNC: 4 MMOL/L (ref 3.4–5.3)
PREALB SERPL IA-MCNC: 25 MG/DL (ref 15–45)
PROT SERPL-MCNC: 7.8 G/DL (ref 6.8–8.8)
RBC # BLD AUTO: 4.17 10E6/UL (ref 3.8–5.2)
SODIUM SERPL-SCNC: 138 MMOL/L (ref 133–144)
TIBC SERPL-MCNC: 266 UG/DL (ref 240–430)
VIT B12 SERPL-MCNC: 616 PG/ML (ref 193–986)
WBC # BLD AUTO: 5 10E3/UL (ref 4–11)

## 2022-02-17 PROCEDURE — 82542 COL CHROMOTOGRAPHY QUAL/QUAN: CPT | Mod: 90 | Performed by: PATHOLOGY

## 2022-02-17 PROCEDURE — 84681 ASSAY OF C-PEPTIDE: CPT | Mod: 90 | Performed by: PATHOLOGY

## 2022-02-17 PROCEDURE — 82306 VITAMIN D 25 HYDROXY: CPT | Mod: 90 | Performed by: PATHOLOGY

## 2022-02-17 PROCEDURE — 84630 ASSAY OF ZINC: CPT | Mod: 90 | Performed by: PATHOLOGY

## 2022-02-17 PROCEDURE — 84134 ASSAY OF PREALBUMIN: CPT | Performed by: PATHOLOGY

## 2022-02-17 PROCEDURE — 83550 IRON BINDING TEST: CPT | Performed by: PATHOLOGY

## 2022-02-17 PROCEDURE — 84590 ASSAY OF VITAMIN A: CPT | Mod: 90 | Performed by: PATHOLOGY

## 2022-02-17 PROCEDURE — 36415 COLL VENOUS BLD VENIPUNCTURE: CPT | Performed by: PATHOLOGY

## 2022-02-17 PROCEDURE — 84446 ASSAY OF VITAMIN E: CPT | Mod: 90 | Performed by: PATHOLOGY

## 2022-02-17 PROCEDURE — 99215 OFFICE O/P EST HI 40 MIN: CPT | Performed by: PEDIATRICS

## 2022-02-17 PROCEDURE — 83036 HEMOGLOBIN GLYCOSYLATED A1C: CPT | Performed by: PATHOLOGY

## 2022-02-17 PROCEDURE — 99000 SPECIMEN HANDLING OFFICE-LAB: CPT | Performed by: PATHOLOGY

## 2022-02-17 PROCEDURE — 82728 ASSAY OF FERRITIN: CPT | Performed by: PATHOLOGY

## 2022-02-17 PROCEDURE — 82607 VITAMIN B-12: CPT | Performed by: PATHOLOGY

## 2022-02-17 PROCEDURE — 82747 ASSAY OF FOLIC ACID RBC: CPT | Mod: 90 | Performed by: PATHOLOGY

## 2022-02-17 PROCEDURE — 80053 COMPREHEN METABOLIC PANEL: CPT | Performed by: PATHOLOGY

## 2022-02-17 PROCEDURE — 85025 COMPLETE CBC W/AUTO DIFF WBC: CPT | Performed by: PATHOLOGY

## 2022-02-17 RX ORDER — ACARBOSE 50 MG/1
50 TABLET ORAL
Qty: 180 TABLET | Refills: 5 | Status: SHIPPED | OUTPATIENT
Start: 2022-02-17 | End: 2022-12-01

## 2022-02-17 NOTE — PATIENT INSTRUCTIONS
1)  We will restart the acarbose at 50 mg (1 pill) three times a day with meals (the ones that seem to affect your blood sugar most).  If you tolerate that and you are still having bit swings from high to low then go up to 100 mg TID.    2)  Follow up:  May 19 at 1pm.

## 2022-02-17 NOTE — NURSING NOTE
360 mL boost given    61.9 kg x 6 mL is 371.4    Blood glucose is 137    verified pt was fasting, she is taking her enzymes with the boost, does not take any insulin.    Boost finished at 11:41, will get her next lab draws at 12:41 and 13:41. Writer will call and inform lab of draw times.     Pt verbally understood and agreed to remain fasting and the lab draw times. She will come back to clinic after second draw to see provider.     Shantell Sebastian, WIN  2/17/2022 11:43 AM

## 2022-02-17 NOTE — PROGRESS NOTES
[facility]  Islet Autotransplant, Diabetes Follow Up    Problem List:  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     Incisional hernia, without obstruction or gangrene     Adhesive capsulitis of shoulder, unspecified laterality     S/P hernia repair     Headache, chronic migraine without aura     Chronic pain syndrome     Iron deficiency anemia     Hypoglycemia     Adult failure to thrive     Abdominal pain, generalized     Gastrostomy tube obstruction (H)     Intra-abdominal abscess (H)     Postprocedural intraabdominal abscess       HPI:  Dinora is a 56 year old female here for follow up oflaparoscopic assisted total pancreatectomy, islet cell autotransplant, splenectomy, gastrojejunostomy tube revision, choledochoduodenostomy, duodenojejunostomy, Vera-Y reconstruction performed on 2016.  At the time of the procedure, the patient received:  Total Islet number: [id number].  Total Islet number/k.  Islet equivalents: [id number]  Islet equivalents/kilogram: 4091    Post-surgical course was complicated by two minor procedures for a retained foreign body near GJ site in 2017 and hernia repair 2018, and more recently by gastroparesis.  She has been insulin independent since about 1 year after surgery.    - Gastroparesis is her major issue post TPIAT. Admitted , , , 12/3.  GJ tube failed (multiple issues),  now has G-tube only for venting. Acupuncture now= helpful.  Botox treatment of unclear benefit yet. Discussion of domperidone with GI team.   - Also has hypoglycemia (prior treatments SGLT2 inhibitor not helpful, acarbose is helpful).     At today's visit,   Dinora returns today for routine follow up of hyper and hypoglycemia management post TPIAT.  She is on no insulin or oral medication currently.  Her overall statistics on the CGM look great, but when you look at daily detail, you do see rapid spikes to 200s and rapid falls to low normal on the CGM.  These are quite bothersome to her and really wear her out for the day.  Acarbose has helped in the past and we have not yet restarted this.  Her Yandy is now linked to our clinic from her phone beatris which has been hugely helpful in following her data.   Still on very limited diet for GI reasons-- many of the foods she can eat are high glycemic index though also some protein options too.  Gastroparesis better with Gtube venting, acupuncture and possibly the botox helping as well.     Diabetes history:  Current insulin regimen:  none    Wearing Yandy                Recent hemoglobin A1c levels:  Lab Results   Component Value Date    A1C 5.4 02/17/2022    A1C 5.2 11/27/2021    A1C 5.5 09/18/2021    A1C 5.4 08/05/2021    A1C 5.7 05/12/2021    A1C 5.9 04/01/2021    A1C 5.5 12/17/2020    A1C 5.8 07/30/2020           Hypoglycemia history: mild lows but rapid falls The patient has had 0 episodes of severe hypoglycemia (seizure, coma, or neuroglycopenic symptoms severe enough to require assistance from another person).  Blood sugars were reviewed from the patient records and/or the meter download.          Review of systems:  A complete ROS is negative except as noted in HPI above.  With her gastroparesis, she does have vomiting and bloating.  She is stopping amitiza and mag citrate and starting a new motility med.    Past Medical and Surgical History:  Past Medical History:    Diagnosis Date     Allergic rhinitis, cause unspecified      Allergy to other foods     strawberries, apples, celeries, alice, watermelon     Arthritis     left knee     Choledocholithiasis     long after cholecystectomy     Chronic abdominal pain      Chronic constipation      Chronic nausea      Chronic pancreatitis (H)      Degeneration of lumbar or lumbosacral intervertebral disc      Esophageal reflux     w/ hiatal hernia     Gastroparesis      Hiatal hernia      History of pituitary adenoma     s/p resection     Hypothyroidism      Migraines      Mild hyperlipidemia      On tube feeding diet     presence of GJ tube     Pancreatic disease     PD stricture, 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 endometriosis growth     APPENDECTOMY        SECTION       COLONOSCOPY       ENDOSCOPIC INSERTION TUBE GASTROSTOMY N/A 2021    Procedure: Gastrojejonostomy placement with jejunopexy, PEG tube exchange;  Surgeon: Zackery Montoya MD;  [facility]     ENDOSCOPIC RETROGRADE CHOLANGIOPANCREATOGRAM N/A 2015    Procedure: ENDOSCOPIC RETROGRADE CHOLANGIOPANCREATOGRAM;  Surgeon: Mandeep Park MD;  [facility]     ENDOSCOPIC RETROGRADE CHOLANGIOPANCREATOGRAM N/A 2016    Procedure: COMBINED ENDOSCOPIC RETROGRADE CHOLANGIOPANCREATOGRAPHY, PLACE TUBE/STENT;  Surgeon: Mandeep Park MD;  [facility]     ENDOSCOPIC RETROGRADE CHOLANGIOPANCREATOGRAM N/A 3/17/2016    Procedure: COMBINED ENDOSCOPIC RETROGRADE CHOLANGIOPANCREATOGRAPHY, REMOVE FOREIGN BODY OR STENT/TUBE;  Surgeon: Mandeep Park MD;  [facility]     ENDOSCOPIC RETROGRADE CHOLANGIOPANCREATOGRAM N/A 2016    Procedure: COMBINED ENDOSCOPIC RETROGRADE  CHOLANGIOPANCREATOGRAPHY, PLACE TUBE/STENT;  Surgeon: Mandeep Park MD;  [facility]     ENDOSCOPIC RETROGRADE CHOLANGIOPANCREATOGRAM N/A 8/26/2016    Procedure: COMBINED ENDOSCOPIC RETROGRADE CHOLANGIOPANCREATOGRAPHY, REMOVE FOREIGN BODY OR STENT/TUBE;  Surgeon: Mandeep Park MD;  [facility]     ENDOSCOPIC ULTRASOUND UPPER GASTROINTESTINAL TRACT (GI) N/A 10/3/2016    Procedure: ENDOSCOPIC ULTRASOUND, ESOPHAGOSCOPY / UPPER GASTROINTESTINAL TRACT (GI);  Surgeon: Guru Jose Rodas MD;  [facility]     ENTEROSCOPY SMALL BOWEL N/A 2/3/2022    Procedure: ENTEROSCOPY with possible fistula closure;  Surgeon: Francisco Dodson MD;  Location:  GI     ESOPHAGOSCOPY, GASTROSCOPY, DUODENOSCOPY (EGD), COMBINED N/A 6/24/2015    Procedure: COMBINED ESOPHAGOSCOPY, GASTROSCOPY, DUODENOSCOPY (EGD), REMOVE FOREIGN BODY;  Surgeon: Mandeep Park MD;  [facility]     ESOPHAGOSCOPY, GASTROSCOPY, DUODENOSCOPY (EGD), COMBINED N/A 10/25/2015    Procedure: COMBINED ESOPHAGOSCOPY, GASTROSCOPY, DUODENOSCOPY (EGD);  Surgeon: Sammy Amaro MD;  Location:  GI     ESOPHAGOSCOPY, GASTROSCOPY, DUODENOSCOPY (EGD), COMBINED N/A 10/25/2015    Procedure: COMBINED ESOPHAGOSCOPY, GASTROSCOPY, DUODENOSCOPY (EGD), BIOPSY SINGLE OR MULTIPLE;  Surgeon: Sammy Amaro MD;  [facility]     ESOPHAGOSCOPY, GASTROSCOPY, DUODENOSCOPY (EGD), DILATATION, COMBINED       EXCISE LESION TRUNK N/A 4/17/2017    Procedure: EXCISE LESION TRUNK;  Removal of Abdominal Foreign Body;  Surgeon: Nestor Phoenix MD;  [facility]     HC ESOPH/GAS REFLUX TEST W NASAL IMPED >1 HR N/A 11/19/2015    Procedure: ESOPHAGEAL IMPEDENCE FUNCTION TEST WITH 24 HOUR PH GREATER THAN 1 HOUR;  Surgeon: Thiago Apple MD;  [facility]     HC UGI ENDOSCOPY DIAG W BIOPSY  9/17/08     HC UGI ENDOSCOPY DIAG W BIOPSY  9/27/12     HC UGI ENDOSCOPY W ESOPHAGEAL DILATION BALLOON <30MM  9/17/08     [facility] U[facility]  ENDOSCOPY W EUS N/A 2015    Procedure: COMBINED ENDOSCOPIC ULTRASOUND, ESOPHAGOSCOPY, GASTROSCOPY, DUODENOSCOPY (EGD);  Surgeon: Wm Dueñas MD;  [facility]     HC WRIST ARTHROSCOP,RELEASE XVERS LIG Bilateral 08     INJECT TRANSVERSUS ABDOMINIS PLANE (TAP) BLOCK BILATERAL Left 2016    Procedure: INJECT TRANSVERSUS ABDOMINIS PLANE (TAP) BLOCK BILATERAL;  Surgeon: Dickson Corrigan MD;  [facility]     laparoscopic pineda       LAPAROSCOPIC HERNIORRHAPHY INCISIONAL N/A 2018    Procedure: LAPAROSCOPIC HERNIORRHAPHY INCISIONAL;  Laparoscopic Incisional Hernia Repair with Symbotex Mesh Implant;  Surgeon: Nestor Phoenix MD;  [facility]     LAPAROSCOPIC PANCREATECTOMY, TRANSPLANT AUTO ISLET CELL N/A 2016    Procedure: LAPAROSCOPIC PANCREATECTOMY, TRANSPLANT AUTO ISLET CELL;  Surgeon: Nestor Phoenix MD;  [facility]     REMOVE GASTROSTOMY TUBE ADULT N/A 2022    Procedure: REMOVAL, GASTROSTOMY TUBE, ADULT;  Surgeon: Mandeep Park MD;  [facility]     REPLACE GASTROJEJUNOSTOMY TUBE, PERCUTANEOUS N/A 2021    Procedure: GASTROJEJUNOSTOMY TUBE PLACEMENT, PERCUTANEOUS, WITH GASTROPEXY;  Surgeon: Mandeep Park MD;  [facility]     REPLACE GASTROJEJUNOSTOMY TUBE, PERCUTANEOUS N/A 2021    Procedure: REPLACEMENT, GASTROJEJUNOSTOMY TUBE, PERCUTANEOUS;  Surgeon: Zackery Montoya MD;  [facility]     REPLACE JEJUNOSTOMY TUBE, PERCUTANEOUS N/A 9/10/2021    Procedure: UPPER ENDOSCOPY, REPLACEMENT OF PERCUTANEOUS GASTROJEJUNOSTOMY TUBE, T-TAG GASTROPEXY;  Surgeon: Zackery Montoya MD;  [facility]     REPLACE JEJUNOSTOMY TUBE, PERCUTANEOUS N/A 2021    Procedure: REPLACEMENT, JEJUNOSTOMY TUBE, PERCUTANEOUS;  Surgeon: Mandeep Park MD;  [facility]     transphenoidal pituitary resection       [facility]  DELIVERY ONLY       [facility]  DELIVERY ONLY      repeat c section with incidental  cystotomy with repair     [facility] EXCIS PITUITARY,TRANSNASAL/SEPTAL  1980    pituitary tumor removed for diabetes insipidus     ZZC TOTAL ABDOM HYSTERECTOMY  1999    w/ bilateral salpingoophorectomy        Family History:  New changes since last visit:  none  Family History   Problem Relation Age of Onset     Eye Disorder Father         cataract, detached retina     Myocardial Infarction Father 60     Lipids Father      Cerebrovascular Disease Father      Depression Father      Substance Abuse Father      Anesthesia Reaction Father         stroke right after surgery     Cataracts Father      Osteoarthritis Father      Ulcerative Colitis Father      Lipids Mother      Hypertension Mother      Thyroid Disease Mother      Depression Mother      Angina Mother      GERD Mother      Skin Cancer Mother      Eye Disorder Son         ptosis     Eye Disorder Paternal Grandmother         cataract     Eye Disorder Paternal Grandfather         cataract     Diabetes Paternal Grandfather      Eye Disorder Maternal Grandmother         cataract     Thyroid Disease Maternal Grandmother      Coronary Artery Disease Sister         angioplasty     GERD Sister      Substance Abuse Sister      Depression Sister      Depression Son      Anxiety Disorder Son      Thyroid Disease Sister      Diabetes Maternal Grandfather      Depression Nephew      Anxiety Disorder Nephew      Thyroid Disease Nephew      Diabetes Type 2  Cousin         paternal cousin     Heart Disease Paternal Aunt      Diabetes Paternal Aunt      Diabetes Paternal Uncle      Heart Disease Paternal Uncle        Social History:  Social History     Social History Narrative     with 3 children and a dog.  No smoking, etoh or drug use.  Worked as a  for [employer] in [employer] in the past.  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      Physical Exam:  Vitals: /73   Pulse 100   Wt 61.7 kg (136 lb)   SpO2 100%   Breastfeeding No   BMI 21.30 kg/m    BMI= Body mass index is 21.3 kg/m .  General:  Appearance is normal, no acute distress  HEENT:  NC/AT, sclera appear white  Neck:  No obvious thyromegaly  CV/Lungs:  Non distressed breathing  Skin:  No apparent rashes  Neuro:  Normal mental status  Psych:  Normal affect          Assessment:  1.  Post pancreatectomy diabetes mellitus, s/p total pancreatectomy and islet autotransplant.  (ICD-10 E89.1)  2.  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   Gastroparesis  4.  Nausea, vomiting      Dinora is a 56 year old with history of chronic pancreatitis who is s/p total pancreatectomy and islet autotransplant.  She has been insulin independent with A1c in goal range.    Her mean glucose on her CGM is a bit higher than would be reflected by her HbA1c.  At this point I don't think we need to add insulin or treat 'diabetes' per se, but I am concerned about the return of this reactive hyper and hypogylcemia pattern.  For that reason we are restarting the acarbose today. Unfortunately we are limited in being able to address this with dietary changes b/c of her restricted diet for gastroparesis.  We made the plan below.      Plan:  1.  Changes to current diabetes regimen:  Patient Instructions   1)  We will restart the acarbose at 50 mg (1 pill) three times a day with meals (the ones that seem to affect your blood sugar most).  If you tolerate that and you are still having bit swings from high to low then go up to 100 mg TID.    2)  Follow up:  May 19 at 1pm.       2.  Frequency of blood sugar checks:  Keep wearing Yandy-- this is much better for Dinora than fingersticks because with fingersticks we miss the really critical data of the post prandial excursions.    3.  Continue  routine follow up for autoislet transplant patients:  Mixed meal test (6 mL/kg BoostHP to max of 360 mL) at 3 months, 6 months, and once a year post transplant.  Hemoglobin A1c levels at these time points and quarterly.    4.  Other issues addressed today:  none    Follow up:  4 mos    Contact me for questions at [phone] or [phone].  Emergency number to reach pediatric endocrinology after hours is [phone].    Dr. Gloria Melissa MD  [facility] [place]  Director of Research, Islet Auto-transplant Program  Phone:  [phone]  Electronically signed: February 17, 2022 at 3:55 PM            Review of the result(s) of each unique test - HbA1c, jim  40 minutes spent on the date of the encounter doing chart review, history and exam, documentation, downloading and reviewing CGM data,  and further activities per the note

## 2022-02-17 NOTE — LETTER
2022     RE: Dinora Mcghee  [address] [place] [address]    Dear Colleague,    Thank you for referring your patient, Dinora Mcghee, to the [facility]. Please see a copy of my visit note below.    [facility]  Islet Autotransplant, Diabetes Follow Up    Problem List:  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     Incisional hernia, without obstruction or gangrene     Adhesive capsulitis of shoulder, unspecified laterality     S/P hernia repair     Headache, chronic migraine without aura     Chronic pain syndrome     Iron deficiency anemia     Hypoglycemia     Adult failure to thrive     Abdominal pain, generalized     Gastrostomy tube obstruction (H)     Intra-abdominal abscess (H)     Postprocedural intraabdominal abscess       HPI:  Dinora is a 56 year old female here for follow up oflaparoscopic assisted total pancreatectomy, islet cell autotransplant, splenectomy, gastrojejunostomy tube revision, choledochoduodenostomy, duodenojejunostomy, Vera-Y reconstruction performed on 2016.  At the time of the procedure, the patient received:  Total Islet number: [id number].  Total Islet number/k.  Islet equivalents: [id number]  Islet equivalents/kilogram: 4091    Post-surgical course was complicated by two minor procedures for a retained foreign body near GJ site in 2017 and  hernia repair 8/2018, and more recently by gastroparesis.  She has been insulin independent since about 1 year after surgery.    - Gastroparesis is her major issue post TPIAT. Admitted 9/7, 9/18, 11/27, 12/3.  GJ tube failed (multiple issues), now has G-tube only for venting. Acupuncture now= helpful.  Botox treatment of unclear benefit yet. Discussion of domperidone with GI team.   - Also has hypoglycemia (prior treatments SGLT2 inhibitor not helpful, acarbose is helpful).     At today's visit,   Dinora returns today for routine follow up of hyper and hypoglycemia management post TPIAT.  She is on no insulin or oral medication currently.  Her overall statistics on the CGM look great, but when you look at daily detail, you do see rapid spikes to 200s and rapid falls to low normal on the CGM.  These are quite bothersome to her and really wear her out for the day.  Acarbose has helped in the past and we have not yet restarted this.  Her Yandy is now linked to our clinic from her phone beatris which has been hugely helpful in following her data.   Still on very limited diet for GI reasons-- many of the foods she can eat are high glycemic index though also some protein options too.  Gastroparesis better with Gtube venting, acupuncture and possibly the botox helping as well.     Diabetes history:  Current insulin regimen:  none    Wearing Yandy                Recent hemoglobin A1c levels:  Lab Results   Component Value Date    A1C 5.4 02/17/2022    A1C 5.2 11/27/2021    A1C 5.5 09/18/2021    A1C 5.4 08/05/2021    A1C 5.7 05/12/2021    A1C 5.9 04/01/2021    A1C 5.5 12/17/2020    A1C 5.8 07/30/2020           Hypoglycemia history: mild lows but rapid falls The patient has had 0 episodes of severe hypoglycemia (seizure, coma, or neuroglycopenic symptoms severe enough to require assistance from another person).  Blood sugars were reviewed from the patient records and/or the meter download.          Review of systems:  A complete  ROS is negative except as noted in HPI above.  With her gastroparesis, she does have vomiting and bloating.  She is stopping amitiza and mag citrate and starting a new motility med.    Past Medical and Surgical History:  Past Medical History:   Diagnosis Date     Allergic rhinitis, cause unspecified      Allergy to other foods     strawberries, apples, celeries, alice, watermelon     Arthritis     left knee     Choledocholithiasis     long after cholecystectomy     Chronic abdominal pain      Chronic constipation      Chronic nausea      Chronic pancreatitis (H)      Degeneration of lumbar or lumbosacral intervertebral disc      Esophageal reflux     w/ hiatal hernia     Gastroparesis      Hiatal hernia      History of pituitary adenoma     s/p resection     Hypothyroidism      Migraines      Mild hyperlipidemia      On tube feeding diet     presence of GJ tube     Pancreatic disease     PD stricture, 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 endometriosis growth     APPENDECTOMY        SECTION       COLONOSCOPY       ENDOSCOPIC INSERTION TUBE GASTROSTOMY N/A 2021    Procedure: Gastrojejonostomy placement with jejunopexy, PEG tube exchange;  Surgeon: Zackery Montoya MD;  [facility]     ENDOSCOPIC RETROGRADE CHOLANGIOPANCREATOGRAM N/A 2015    Procedure: ENDOSCOPIC RETROGRADE CHOLANGIOPANCREATOGRAM;  Surgeon: Mandeep Park MD;  [facility]     ENDOSCOPIC RETROGRADE CHOLANGIOPANCREATOGRAM N/A 2016    Procedure: COMBINED ENDOSCOPIC RETROGRADE CHOLANGIOPANCREATOGRAPHY, PLACE TUBE/STENT;  Surgeon: Mandeep Park MD;  [facility]     ENDOSCOPIC RETROGRADE CHOLANGIOPANCREATOGRAM N/A 3/17/2016    Procedure: COMBINED  ENDOSCOPIC RETROGRADE CHOLANGIOPANCREATOGRAPHY, REMOVE FOREIGN BODY OR STENT/TUBE;  Surgeon: Mandeep Park MD;  [facility]     ENDOSCOPIC RETROGRADE CHOLANGIOPANCREATOGRAM N/A 8/2/2016    Procedure: COMBINED ENDOSCOPIC RETROGRADE CHOLANGIOPANCREATOGRAPHY, PLACE TUBE/STENT;  Surgeon: Mandeep Park MD;  [facility]     ENDOSCOPIC RETROGRADE CHOLANGIOPANCREATOGRAM N/A 8/26/2016    Procedure: COMBINED ENDOSCOPIC RETROGRADE CHOLANGIOPANCREATOGRAPHY, REMOVE FOREIGN BODY OR STENT/TUBE;  Surgeon: Mandeep Park MD;  [facility]     ENDOSCOPIC ULTRASOUND UPPER GASTROINTESTINAL TRACT (GI) N/A 10/3/2016    Procedure: ENDOSCOPIC ULTRASOUND, ESOPHAGOSCOPY / UPPER GASTROINTESTINAL TRACT (GI);  Surgeon: Guru Jose Rodas MD;  [facility]     ENTEROSCOPY SMALL BOWEL N/A 2/3/2022    Procedure: ENTEROSCOPY with possible fistula closure;  Surgeon: Francisco Dodson MD;  Location:  GI     ESOPHAGOSCOPY, GASTROSCOPY, DUODENOSCOPY (EGD), COMBINED N/A 6/24/2015    Procedure: COMBINED ESOPHAGOSCOPY, GASTROSCOPY, DUODENOSCOPY (EGD), REMOVE FOREIGN BODY;  Surgeon: Mandeep Park MD;  Location:  GI     ESOPHAGOSCOPY, GASTROSCOPY, DUODENOSCOPY (EGD), COMBINED N/A 10/25/2015    Procedure: COMBINED ESOPHAGOSCOPY, GASTROSCOPY, DUODENOSCOPY (EGD);  Surgeon: Sammy Amaro MD;  Location:  GI     ESOPHAGOSCOPY, GASTROSCOPY, DUODENOSCOPY (EGD), COMBINED N/A 10/25/2015    Procedure: COMBINED ESOPHAGOSCOPY, GASTROSCOPY, DUODENOSCOPY (EGD), BIOPSY SINGLE OR MULTIPLE;  Surgeon: Sammy Amaro MD;  Location:  GI     ESOPHAGOSCOPY, GASTROSCOPY, DUODENOSCOPY (EGD), DILATATION, COMBINED       EXCISE LESION TRUNK N/A 4/17/2017    Procedure: EXCISE LESION TRUNK;  Removal of Abdominal Foreign Body;  Surgeon: Nestor Phoenix MD;  [facility]     HC ESOPH/GAS REFLUX TEST W NASAL IMPED >1 HR N/A 11/19/2015    Procedure: ESOPHAGEAL IMPEDENCE FUNCTION TEST WITH 24 HOUR PH  GREATER THAN 1 HOUR;  Surgeon: Thiago Apple MD;  [facility]     HC UGI ENDOSCOPY DIAG W BIOPSY  9/17/08     HC UGI ENDOSCOPY DIAG W BIOPSY  9/27/12     HC UGI ENDOSCOPY W ESOPHAGEAL DILATION BALLOON <30MM  9/17/08     HC UGI ENDOSCOPY W EUS N/A 5/5/2015    Procedure: COMBINED ENDOSCOPIC ULTRASOUND, ESOPHAGOSCOPY, GASTROSCOPY, DUODENOSCOPY (EGD);  Surgeon: Wm Dueñas MD;  [facility]     HC WRIST ARTHROSCOP,RELEASE XVERS LIG Bilateral 12/17/08     INJECT TRANSVERSUS ABDOMINIS PLANE (TAP) BLOCK BILATERAL Left 9/22/2016    Procedure: INJECT TRANSVERSUS ABDOMINIS PLANE (TAP) BLOCK BILATERAL;  Surgeon: Dickson Corrigan MD;  [facility]     laparoscopic pineda  1995     LAPAROSCOPIC HERNIORRHAPHY INCISIONAL N/A 8/23/2018    Procedure: LAPAROSCOPIC HERNIORRHAPHY INCISIONAL;  Laparoscopic Incisional Hernia Repair with Symbotex Mesh Implant;  Surgeon: Nestor Phoenix MD;  [facility]     LAPAROSCOPIC PANCREATECTOMY, TRANSPLANT AUTO ISLET CELL N/A 12/28/2016    Procedure: LAPAROSCOPIC PANCREATECTOMY, TRANSPLANT AUTO ISLET CELL;  Surgeon: Nestor Phoenix MD;  [facility]     REMOVE GASTROSTOMY TUBE ADULT N/A 1/28/2022    Procedure: REMOVAL, GASTROSTOMY TUBE, ADULT;  Surgeon: Mandeep Park MD;  [facility]     REPLACE GASTROJEJUNOSTOMY TUBE, PERCUTANEOUS N/A 9/7/2021    Procedure: GASTROJEJUNOSTOMY TUBE PLACEMENT, PERCUTANEOUS, WITH GASTROPEXY;  Surgeon: Mandeep Park MD;  [facility]     REPLACE GASTROJEJUNOSTOMY TUBE, PERCUTANEOUS N/A 9/22/2021    Procedure: REPLACEMENT, GASTROJEJUNOSTOMY TUBE, PERCUTANEOUS;  Surgeon: Zackery Montoya MD;  [facility]     REPLACE JEJUNOSTOMY TUBE, PERCUTANEOUS N/A 9/10/2021    Procedure: UPPER ENDOSCOPY, REPLACEMENT OF PERCUTANEOUS GASTROJEJUNOSTOMY TUBE, T-TAG GASTROPEXY;  Surgeon: Zackery Montoya MD;  [facility]     REPLACE JEJUNOSTOMY TUBE, PERCUTANEOUS N/A 12/29/2021    Procedure: REPLACEMENT,  JEJUNOSTOMY TUBE, PERCUTANEOUS;  Surgeon: Mandeep Park MD;  [facility]     transphenoidal pituitary resection       ZC  DELIVERY ONLY       ZC  DELIVERY ONLY      repeat c section with incidental cystotomy with repair     Z EXCIS PITUITARY,TRANSNASAL/SEPTAL  1980    pituitary tumor removed for diabetes insipidus     ZZC TOTAL ABDOM HYSTERECTOMY      w/ bilateral salpingoophorectomy        Family History:  New changes since last visit:  none  Family History   Problem Relation Age of Onset     Eye Disorder Father         cataract, detached retina     Myocardial Infarction Father 60     Lipids Father      Cerebrovascular Disease Father      Depression Father      Substance Abuse Father      Anesthesia Reaction Father         stroke right after surgery     Cataracts Father      Osteoarthritis Father      Ulcerative Colitis Father      Lipids Mother      Hypertension Mother      Thyroid Disease Mother      Depression Mother      Angina Mother      GERD Mother      Skin Cancer Mother      Eye Disorder Son         ptosis     Eye Disorder Paternal Grandmother         cataract     Eye Disorder Paternal Grandfather         cataract     Diabetes Paternal Grandfather      Eye Disorder Maternal Grandmother         cataract     Thyroid Disease Maternal Grandmother      Coronary Artery Disease Sister         angioplasty     GERD Sister      Substance Abuse Sister      Depression Sister      Depression Son      Anxiety Disorder Son      Thyroid Disease Sister      Diabetes Maternal Grandfather      Depression Nephew      Anxiety Disorder Nephew      Thyroid Disease Nephew      Diabetes Type 2  Cousin         paternal cousin     Heart Disease Paternal Aunt      Diabetes Paternal Aunt      Diabetes Paternal Uncle      Heart Disease Paternal Uncle        Social History:  Social History     Social History Narrative     with 3 children and a dog.  No smoking, etoh or drug use.   Worked as a  for [employer] in [place] in the past.  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     Physical Exam:  Vitals: /73   Pulse 100   Wt 61.7 kg (136 lb)   SpO2 100%   Breastfeeding No   BMI 21.30 kg/m    BMI= Body mass index is 21.3 kg/m .  General:  Appearance is normal, no acute distress  HEENT:  NC/AT, sclera appear white  Neck:  No obvious thyromegaly  CV/Lungs:  Non distressed breathing  Skin:  No apparent rashes  Neuro:  Normal mental status  Psych:  Normal affect          Assessment:  1.  Post pancreatectomy diabetes mellitus, s/p total pancreatectomy and islet autotransplant.  (ICD-10 E89.1)  2.  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   Gastroparesis  4.  Nausea, vomiting      Dinora is a 56 year old with history of chronic pancreatitis who is s/p total pancreatectomy and islet autotransplant.  She has been insulin independent with A1c in goal range.    Her mean glucose on her CGM is a bit higher than would be reflected by her HbA1c.  At this point I don't think we need to add insulin or treat 'diabetes' per se, but I am concerned about the return of this reactive hyper and hypogylcemia pattern.  For that reason we are restarting the acarbose today. Unfortunately we are limited in being able to address this with dietary changes b/c of her restricted diet for gastroparesis.  We made the plan below.      Plan:  1.  Changes to current diabetes regimen:  Patient Instructions   1)  We will restart the acarbose at 50 mg (1 pill) three times a day with meals (the ones that seem to affect your blood sugar most).  If you tolerate that and you are still having bit swings from high to low then go up to 100 mg TID.    2)  Follow up:   May 19 at 1pm.       2.  Frequency of blood sugar checks:  Keep wearing Yandy-- this is much better for Dinora than fingersticks because with fingersticks we miss the really critical data of the post prandial excursions.    3.  Continue routine follow up for autoislet transplant patients:  Mixed meal test (6 mL/kg BoostHP to max of 360 mL) at 3 months, 6 months, and once a year post transplant.  Hemoglobin A1c levels at these time points and quarterly.    4.  Other issues addressed today:  none    Follow up:  4 mos    Contact me for questions at [phone] or [phone].  Emergency number to reach pediatric endocrinology after hours is [phone].    Dr. Gloria Melissa MD  [facility]  Director of Research, Islet Auto-transplant Program  Phone:  [phone]  Electronically signed: February 17, 2022 at 3:55 PM    Review of the result(s) of each unique test - HbA1c, yandy  40 minutes spent on the date of the encounter doing chart review, history and exam, documentation, downloading and reviewing CGM data,  and further activities per the note    Again, thank you for allowing me to participate in the care of your patient.      Sincerely,    Gloria Melissa MD

## 2022-02-18 LAB
C PEPTIDE SERPL-MCNC: 1.6 NG/ML (ref 0.9–6.9)
C PEPTIDE SERPL-MCNC: 2.3 NG/ML (ref 0.9–6.9)
C PEPTIDE SERPL-MCNC: 2.5 NG/ML (ref 0.9–6.9)
DEPRECATED CALCIDIOL+CALCIFEROL SERPL-MC: <54 UG/L (ref 20–75)
FOLATE RBC-MCNC: 702 NG/ML
VITAMIN D2 SERPL-MCNC: <5 UG/L
VITAMIN D3 SERPL-MCNC: 49 UG/L

## 2022-02-20 LAB
A-TOCOPHEROL VIT E SERPL-MCNC: 13.6 MG/L
ANNOTATION COMMENT IMP: NORMAL
BETA+GAMMA TOCOPHEROL SERPL-MCNC: 0.6 MG/L
RETINYL PALMITATE SERPL-MCNC: <0.02 MG/L
VIT A SERPL-MCNC: 0.61 MG/L

## 2022-02-22 ENCOUNTER — MEDICAL CORRESPONDENCE (OUTPATIENT)
Dept: HEALTH INFORMATION MANAGEMENT | Facility: CLINIC | Age: 57
End: 2022-02-22
Payer: COMMERCIAL

## 2022-02-22 LAB
A-LINOLENATE SERPL-SCNC: 115 NMOL/ML (ref 50–130)
AA SERPL-SCNC: 1281 NMOL/ML (ref 520–1490)
ARACHIDATE SERPL-SCNC: 31 NMOL/ML (ref 50–90)
CLINICAL BIOCHEMIST REVIEW: ABNORMAL
DHA SERPL-SCNC: 159 NMOL/ML (ref 30–250)
DOCOSAPENTAENATE W6 SERPL-SCNC: 30 NMOL/ML (ref 10–70)
DOCOSATETRAENOATE SERPL-SCNC: 37 NMOL/ML (ref 10–80)
DOCOSENOATE SERPL-SCNC: 5 NMOL/ML (ref 4–13)
DPA SERPL-SCNC: 99 NMOL/ML (ref 20–210)
EPA SERPL-SCNC: 157 NMOL/ML (ref 14–100)
FA SERPL-SCNC: 13.6 MMOL/L (ref 7.3–16.8)
G-LINOLENATE SERPL-SCNC: 61 NMOL/ML (ref 16–150)
HEXADECENOATE SERPL-SCNC: 48 NMOL/ML (ref 25–105)
HOMO-G LINOLENATE SERPL-SCNC: 226 NMOL/ML (ref 50–250)
LAURATE SERPL-SCNC: 29 NMOL/ML (ref 6–90)
LINOLEATE SERPL-SCNC: 4369 NMOL/ML (ref 2270–3850)
MEAD ACID SERPL-SCNC: 38 NMOL/ML (ref 7–30)
MONOUNSAT FA SERPL-SCNC: 2.8 MMOL/L (ref 1.3–5.8)
MYRISTATE SERPL-SCNC: 158 NMOL/ML (ref 30–450)
NERVONATE SERPL-SCNC: 111 NMOL/ML (ref 60–100)
OCTADECANOATE SERPL-SCNC: 1257 NMOL/ML (ref 590–1170)
OLEATE SERPL-SCNC: 2122 NMOL/ML (ref 650–3500)
PALMITATE SERPL-SCNC: 2534 NMOL/ML (ref 1480–3730)
PALMITOLEATE SERPL-SCNC: 347 NMOL/ML (ref 110–1130)
POLYUNSAT FA SERPL-SCNC: 6.6 MMOL/L (ref 3.2–5.8)
SAT FA SERPL-SCNC: 4.2 MMOL/L (ref 2.5–5.5)
TRIENOATE/AA SERPL-SRTO: 0.03 {RATIO} (ref 0.01–0.04)
VACCENATE SERPL-SCNC: 169 NMOL/ML (ref 280–740)
W3 FA SERPL-SCNC: 0.5 MMOL/L (ref 0.2–0.5)
W6 FA SERPL-SCNC: 6 MMOL/L (ref 3–5.4)

## 2022-02-23 LAB — ZINC SERPL-MCNC: 70.1 UG/DL

## 2022-02-23 ASSESSMENT — ENCOUNTER SYMPTOMS
BLOOD IN STOOL: 0
NUMBNESS: 0
SINUS PAIN: 0
NAUSEA: 1
TROUBLE SWALLOWING: 0
ALTERED TEMPERATURE REGULATION: 1
ABDOMINAL PAIN: 1
LOSS OF CONSCIOUSNESS: 0
WEAKNESS: 0
SMELL DISTURBANCE: 0
STIFFNESS: 0
MYALGIAS: 1
SPEECH CHANGE: 0
LIGHT-HEADEDNESS: 1
WEIGHT GAIN: 0
JOINT SWELLING: 0
BACK PAIN: 1
NIGHT SWEATS: 0
LEG PAIN: 0
HEADACHES: 1
BLOATING: 1
HYPERTENSION: 0
BOWEL INCONTINENCE: 0
INCREASED ENERGY: 1
HYPOTENSION: 0
DISTURBANCES IN COORDINATION: 0
FATIGUE: 1
DIARRHEA: 1
RECTAL PAIN: 0
SLEEP DISTURBANCES DUE TO BREATHING: 0
TINGLING: 0
MUSCLE WEAKNESS: 0
HOARSE VOICE: 1
TASTE DISTURBANCE: 0
ORTHOPNEA: 0
JAUNDICE: 0
MUSCLE CRAMPS: 1
FEVER: 0
SORE THROAT: 0
ARTHRALGIAS: 1
CONSTIPATION: 1
HALLUCINATIONS: 0
NECK PAIN: 1
POLYPHAGIA: 0
TREMORS: 0
VOMITING: 1
MEMORY LOSS: 0
POLYDIPSIA: 0
SYNCOPE: 0
NECK MASS: 0
EXERCISE INTOLERANCE: 0
PARALYSIS: 0
SEIZURES: 0
DECREASED APPETITE: 1
DIZZINESS: 0
HEARTBURN: 1
WEIGHT LOSS: 0
PALPITATIONS: 0
CHILLS: 0
SINUS CONGESTION: 0

## 2022-02-24 DIAGNOSIS — R11.0 NAUSEA: ICD-10-CM

## 2022-02-25 ENCOUNTER — DOCUMENTATION ONLY (OUTPATIENT)
Dept: GASTROENTEROLOGY | Facility: CLINIC | Age: 57
End: 2022-02-25
Payer: COMMERCIAL

## 2022-02-25 ENCOUNTER — PATIENT OUTREACH (OUTPATIENT)
Dept: GASTROENTEROLOGY | Facility: CLINIC | Age: 57
End: 2022-02-25
Payer: COMMERCIAL

## 2022-02-25 NOTE — PROGRESS NOTES
IV fluid infusion therapy orders faxed to [facility], attn: infusion center, at [phone].    Mansoor Dhillon LPN

## 2022-02-25 NOTE — TELEPHONE ENCOUNTER
"Per patient, when she vents liquid \"blows out like a fire hose\", coming out with a lot of force. Previously nothing would come out when she flushed-   If doesn't vent, she has pain/pressure. \"Output looks ensure with oil in it\"  \"Nothing moving at all- no sounds, small BM yesterday\" On bowel regimen. \"Sometimes GI systems just shuts down and then starts up again\"  Patient has known chronic, complex gastroparesis.    Worried that she's getting dehydrated. Agreed we'd fax her existing infusion orders to local facility    [facility]  [address] [facility], [place] [address]    [phone]  Fax # to infusion center: [phone]    ML  "

## 2022-02-28 ENCOUNTER — OFFICE VISIT (OUTPATIENT)
Dept: ANESTHESIOLOGY | Facility: CLINIC | Age: 57
End: 2022-02-28
Attending: INTERNAL MEDICINE
Payer: COMMERCIAL

## 2022-02-28 VITALS
OXYGEN SATURATION: 100 % | HEART RATE: 98 BPM | SYSTOLIC BLOOD PRESSURE: 123 MMHG | DIASTOLIC BLOOD PRESSURE: 83 MMHG | RESPIRATION RATE: 16 BRPM

## 2022-02-28 DIAGNOSIS — K31.84 GASTROPARESIS: ICD-10-CM

## 2022-02-28 PROCEDURE — 99204 OFFICE O/P NEW MOD 45 MIN: CPT | Mod: GC | Performed by: ANESTHESIOLOGY

## 2022-02-28 ASSESSMENT — ENCOUNTER SYMPTOMS
NAUSEA: 1
TINGLING: 0
ORTHOPNEA: 0
MYALGIAS: 1
DIZZINESS: 0
SINUS PAIN: 0
HEARTBURN: 1
FEVER: 0
ABDOMINAL PAIN: 1
HALLUCINATIONS: 0
BACK PAIN: 1
SORE THROAT: 0
CONSTIPATION: 1
WEIGHT LOSS: 0
SEIZURES: 0
VOMITING: 1
CHILLS: 0
POLYDIPSIA: 0
DIARRHEA: 1
SPEECH CHANGE: 0
NECK PAIN: 1
WEAKNESS: 0
PALPITATIONS: 0
BLOOD IN STOOL: 0
LOSS OF CONSCIOUSNESS: 0
MEMORY LOSS: 0
TREMORS: 0
HEADACHES: 1

## 2022-02-28 ASSESSMENT — PAIN SCALES - GENERAL: PAINLEVEL: MILD PAIN (3)

## 2022-02-28 NOTE — PATIENT INSTRUCTIONS
Medications:    Recommend continuing Duloxetine 30 mg in am for 7 days, then increase to 60 mg in am.     Recommend continuing Flexeril 10 mg by mouth three times daily.     Discussed Lidocaine Patches for abdominal wall pain.    These medications are already prescribed by PCP.         Referrals:    Physical Therapy Referral placed- Rights sided abdominal wall pain.     If you have not heard from the scheduling office within 2 business days, please call [phone] for all locations        Treatment planning:    Continue Acupuncture.         Recommended Follow up:      Return to clinic in 8-12 weeks if pain is not improving, or in need of trigger point injections.         Please call [phone], option #1 to schedule your clinic appointment if you don't already have an appointment scheduled.        To speak with a nurse, schedule/reschedule/cancel a clinic appointment, or request a medication refill call: [phone], option #1.    You can also reach us by [employer]: [email or web address]

## 2022-02-28 NOTE — PROGRESS NOTES
Pain Clinic New Patient Consult Note:    Referring Provider: Xavier   Primary care provider: Latasha Paul    Dinora Mcghee is a 56 year old female who presents to the pain clinic with gastroparesis and abdominal pain which started in November of 2020.  Pt notes at the time she was having difficulty with maintaining nutrition, pain, vomiting, & diarrhea.  By August of 2021 she was started on a feeding tube due to nutritional concerns.  Pt states she has had a total of four procedures/revisions for her GJ/J tube.  The pain significantly increased with the placement of the J tube.  After trying to utilize the J tube patient noted intolerance due to the pain, and the J tube was removed at the end of December 2021.  Since the removal the J tube her pain has decreased from an 8/10 to a 2-3/10.  Prior to the removal of the J tube patient was started on Cymalta, but patient has stopped taking this medication.  She denies any significant side effects or adverse events with the medication, and instead stopped taking it as her pain had significantly decreased.    HPI:  Patient Supplied Answers To the [facility] Pain Questionnaire  [facility] -  Patient Entered Questionnaire/Answers 2/23/2022   What number best describes your pain right now:  0 = No pain  to  10 = Worst pain imaginable 2   How would you describe the pain? cramping, sharp, cutting   Which of the following worsen your pain? lying down, exercise, coughing / sneezing, bowel movements   Which of the following improve or reduce your pain?  standing, walking, relaxation, thinking about something else   What number best describes your average pain for the past week:  0 = No pain  to  10 = Worst pain imaginable 2   What number best describes your LOWEST pain in past 24 hours:  0 = No pain  to  10 = Worst pain imaginable 1   What number best describes your WORST pain in past 24 hours:  0 = No pain  to  10 = Worst pain imaginable 7   When is your pain worst? Night   What  non-medicine treatments have you already had for your pain? physical therapy, relaxation training, acupuncture, TENS (electrical stimulator), counseling, surgery, exercise   Have you tried treating your pain with medication?  Yes   Are you currently taking medications for your pain? Yes           Pain treatments:    Herbal medicines: Tried CBD oil , no improvement  Physical therapy: None  Chiropractor: None  Pain physician: None  Surgery: None  Biofeedback: None  Medical Therapist Counseling: Completed, no improvement noted  Acupuncture: Yes, has noted improvement    Tests/Imaging reviewed with the patient:    None    Significant Medical History:   Past Medical History:   Diagnosis Date     Allergic rhinitis, cause unspecified      Allergy to other foods     strawberries, apples, celeries, alice, watermelon     Arthritis     left knee     Choledocholithiasis     long after cholecystectomy     Chronic abdominal pain      Chronic constipation      Chronic nausea      Chronic pancreatitis (H)      Degeneration of lumbar or lumbosacral intervertebral disc      Esophageal reflux     w/ hiatal hernia     Gastroparesis      Hiatal hernia      History of pituitary adenoma     s/p resection     Hypothyroidism      Migraines      Mild hyperlipidemia      On tube feeding diet     presence of GJ tube     Pancreatic disease     PD stricture, 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 endometriosis growth     APPENDECTOMY        SECTION       COLONOSCOPY       ENDOSCOPIC INSERTION TUBE GASTROSTOMY N/A 2021    Procedure: Gastrojejonostomy placement with jejunopexy, PEG tube exchange;  Surgeon: Zackery Montoya MD;  Location:  OR      ENDOSCOPIC RETROGRADE CHOLANGIOPANCREATOGRAM N/A 6/2/2015    Procedure: ENDOSCOPIC RETROGRADE CHOLANGIOPANCREATOGRAM;  Surgeon: Mandeep Park MD;  [facility]     ENDOSCOPIC RETROGRADE CHOLANGIOPANCREATOGRAM N/A 2/9/2016    Procedure: COMBINED ENDOSCOPIC RETROGRADE CHOLANGIOPANCREATOGRAPHY, PLACE TUBE/STENT;  Surgeon: Mandeep Prak MD;  [facility]     ENDOSCOPIC RETROGRADE CHOLANGIOPANCREATOGRAM N/A 3/17/2016    Procedure: COMBINED ENDOSCOPIC RETROGRADE CHOLANGIOPANCREATOGRAPHY, REMOVE FOREIGN BODY OR STENT/TUBE;  Surgeon: Mandeep Park MD;  [facility]     ENDOSCOPIC RETROGRADE CHOLANGIOPANCREATOGRAM N/A 8/2/2016    Procedure: COMBINED ENDOSCOPIC RETROGRADE CHOLANGIOPANCREATOGRAPHY, PLACE TUBE/STENT;  Surgeon: Mandeep Park MD;  [facility]     ENDOSCOPIC RETROGRADE CHOLANGIOPANCREATOGRAM N/A 8/26/2016    Procedure: COMBINED ENDOSCOPIC RETROGRADE CHOLANGIOPANCREATOGRAPHY, REMOVE FOREIGN BODY OR STENT/TUBE;  Surgeon: Mandeep Park MD;  [facility]     ENDOSCOPIC ULTRASOUND UPPER GASTROINTESTINAL TRACT (GI) N/A 10/3/2016    Procedure: ENDOSCOPIC ULTRASOUND, ESOPHAGOSCOPY / UPPER GASTROINTESTINAL TRACT (GI);  Surgeon: Guru Jose Rodas MD;  [facility]     ENTEROSCOPY SMALL BOWEL N/A 2/3/2022    Procedure: ENTEROSCOPY with possible fistula closure;  Surgeon: Francisco Dodson MD;  Location:  GI     ESOPHAGOSCOPY, GASTROSCOPY, DUODENOSCOPY (EGD), COMBINED N/A 6/24/2015    Procedure: COMBINED ESOPHAGOSCOPY, GASTROSCOPY, DUODENOSCOPY (EGD), REMOVE FOREIGN BODY;  Surgeon: Mandeep Park MD;  [facility]     ESOPHAGOSCOPY, GASTROSCOPY, DUODENOSCOPY (EGD), COMBINED N/A 10/25/2015    Procedure: COMBINED ESOPHAGOSCOPY, GASTROSCOPY, DUODENOSCOPY (EGD);  Surgeon: Sammy Amaro MD;  Location:  GI     ESOPHAGOSCOPY, GASTROSCOPY, DUODENOSCOPY (EGD), COMBINED N/A 10/25/2015    Procedure: COMBINED ESOPHAGOSCOPY, GASTROSCOPY,  DUODENOSCOPY (EGD), BIOPSY SINGLE OR MULTIPLE;  Surgeon: Sammy Amaro MD;  [facility]     ESOPHAGOSCOPY, GASTROSCOPY, DUODENOSCOPY (EGD), DILATATION, COMBINED       EXCISE LESION TRUNK N/A 4/17/2017    Procedure: EXCISE LESION TRUNK;  Removal of Abdominal Foreign Body;  Surgeon: Nestor Phoenix MD;  [facility]     HC ESOPH/GAS REFLUX TEST W NASAL IMPED >1 HR N/A 11/19/2015    Procedure: ESOPHAGEAL IMPEDENCE FUNCTION TEST WITH 24 HOUR PH GREATER THAN 1 HOUR;  Surgeon: hTiago Apple MD;  [facility]     HC UGI ENDOSCOPY DIAG W BIOPSY  9/17/08     HC UGI ENDOSCOPY DIAG W BIOPSY  9/27/12     HC UGI ENDOSCOPY W ESOPHAGEAL DILATION BALLOON <30MM  9/17/08     HC UGI ENDOSCOPY W EUS N/A 5/5/2015    Procedure: COMBINED ENDOSCOPIC ULTRASOUND, ESOPHAGOSCOPY, GASTROSCOPY, DUODENOSCOPY (EGD);  Surgeon: Wm Dueñas MD;  [facility]     HC WRIST ARTHROSCOP,RELEASE XVERS LIG Bilateral 12/17/08     INJECT TRANSVERSUS ABDOMINIS PLANE (TAP) BLOCK BILATERAL Left 9/22/2016    Procedure: INJECT TRANSVERSUS ABDOMINIS PLANE (TAP) BLOCK BILATERAL;  Surgeon: Dickson Corrigan MD;  [facility]     laparoscopic pineda  1995     LAPAROSCOPIC HERNIORRHAPHY INCISIONAL N/A 8/23/2018    Procedure: LAPAROSCOPIC HERNIORRHAPHY INCISIONAL;  Laparoscopic Incisional Hernia Repair with Symbotex Mesh Implant;  Surgeon: Nestor Phoenix MD;  [facility]     LAPAROSCOPIC PANCREATECTOMY, TRANSPLANT AUTO ISLET CELL N/A 12/28/2016    Procedure: LAPAROSCOPIC PANCREATECTOMY, TRANSPLANT AUTO ISLET CELL;  Surgeon: Nestor Phoenix MD;  [facility]     REMOVE GASTROSTOMY TUBE ADULT N/A 1/28/2022    Procedure: REMOVAL, GASTROSTOMY TUBE, ADULT;  Surgeon: Mandeep Park MD;  [facility]     REPLACE GASTROJEJUNOSTOMY TUBE, PERCUTANEOUS N/A 9/7/2021    Procedure: GASTROJEJUNOSTOMY TUBE PLACEMENT, PERCUTANEOUS, WITH GASTROPEXY;  Surgeon: Mandeep Park MD;  [facility]     REPLACE  GASTROJEJUNOSTOMY TUBE, PERCUTANEOUS N/A 2021    Procedure: REPLACEMENT, GASTROJEJUNOSTOMY TUBE, PERCUTANEOUS;  Surgeon: Zackery Montoya MD;  [facility]     REPLACE JEJUNOSTOMY TUBE, PERCUTANEOUS N/A 9/10/2021    Procedure: UPPER ENDOSCOPY, REPLACEMENT OF PERCUTANEOUS GASTROJEJUNOSTOMY TUBE, T-TAG GASTROPEXY;  Surgeon: Zackery Montoya MD;  [facility]     REPLACE JEJUNOSTOMY TUBE, PERCUTANEOUS N/A 2021    Procedure: REPLACEMENT, JEJUNOSTOMY TUBE, PERCUTANEOUS;  Surgeon: Mandeep Park MD;  [facility]     transphenoidal pituitary resection       Z  DELIVERY ONLY       Z  DELIVERY ONLY      repeat c section with incidental cystotomy with repair     Z EXCIS PITUITARY,TRANSNASAL/SEPTAL      pituitary tumor removed for diabetes insipidus     Z TOTAL ABDOM HYSTERECTOMY      w/ bilateral salpingoophorectomy           Family History:  Family History   Problem Relation Age of Onset     Eye Disorder Father         cataract, detached retina     Myocardial Infarction Father 60     Lipids Father      Cerebrovascular Disease Father      Depression Father      Substance Abuse Father      Anesthesia Reaction Father         stroke right after surgery     Cataracts Father      Osteoarthritis Father      Ulcerative Colitis Father      Lipids Mother      Hypertension Mother      Thyroid Disease Mother      Depression Mother      Angina Mother      GERD Mother      Skin Cancer Mother      Eye Disorder Son         ptosis     Eye Disorder Paternal Grandmother         cataract     Eye Disorder Paternal Grandfather         cataract     Diabetes Paternal Grandfather      Eye Disorder Maternal Grandmother         cataract     Thyroid Disease Maternal Grandmother      Coronary Artery Disease Sister         angioplasty     GERD Sister      Substance Abuse Sister      Depression Sister      Depression Son      Anxiety Disorder Son      Thyroid Disease  Sister      Diabetes Maternal Grandfather      Depression Nephew      Anxiety Disorder Nephew      Thyroid Disease Nephew      Diabetes Type 2  Cousin         paternal cousin     Heart Disease Paternal Aunt      Diabetes Paternal Aunt      Diabetes Paternal Uncle      Heart Disease Paternal Uncle           Social History:  Social History     Socioeconomic History     Marital status:      Spouse name: Norris     Number of children: 3     Years of education: 16     Highest education level: Not on file   Occupational History     Occupation: director     Employer: [employer]   Tobacco Use     Smoking status: Former Smoker     Packs/day: 0.50     Years: 6.00     Pack years: 3.00     Types: Cigarettes     Start date: 1985     Quit date: 1992     Years since quittin.1     Smokeless tobacco: Never Used     Tobacco comment: no 2nd hand   Substance and Sexual Activity     Alcohol use: Not Currently     Alcohol/week: 3.0 - 6.0 standard drinks     Types: 1 - 2 Glasses of wine, 1 - 2 Cans of beer, 1 - 2 Shots of liquor per week     Comment: none since IGG     Drug use: No     Sexual activity: Yes     Partners: Male     Birth control/protection: None     Comment: Hystectomy    Other Topics Concern     Parent/sibling w/ CABG, MI or angioplasty before 65F 55M? No      Service Not Asked     Blood Transfusions No     Caffeine Concern Not Asked     Occupational Exposure Not Asked     Hobby Hazards Not Asked     Sleep Concern Not Asked     Stress Concern Not Asked     Weight Concern Not Asked     Special Diet Not Asked     Back Care Not Asked     Exercise Not Asked     Bike Helmet Not Asked     Seat Belt Yes     Self-Exams Not Asked   Social History Narrative     with 3 children and a dog.  No smoking, etoh or drug use.  Worked as a  for [employer] in [place] in the past.  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     Social Determinants of Health      Financial Resource Strain: Not on file   Food Insecurity: Not on file   Transportation Needs: Not on file   Physical Activity: Not on file   Stress: Not on file   Social Connections: Not on file   Intimate Partner Violence: Not on file   Housing Stability: Not on file     Social History     Social History Narrative     with 3 children and a dog.  No smoking, etoh or drug use.  Worked as a  for [employer] in [place] in the past.  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          Allergies:  Allergies   Allergen Reactions     Apple Anaphylaxis     Corticosteroids Other (See Comments)     All oral, IV and injectable steroids are contraindicated (unless in life threatening situations) in [facility] transplant recipients. They can cause irreversible loss of islet cell function. Please contact patient's transplant care coordinator ADI Gaffney RN at [phone]/pager [phone] and/or endocrinologist prior to administration.       Depakote [Divalproex Sodium] Other (See Comments)     Chest pain     Zithromax [Azithromycin Dihydrate] Anaphylaxis     Noted in 4/7/08 ER     Bromfenac      Other reaction(s): Headache     Codeine      Other reaction(s): Hallucinations     Darvocet [Propoxyphene N-Apap] Itching     Hydromorphone Other (See Comments)     Loopy     Morphine Nausea and Vomiting and Rash     Nalbuphine Hcl Rash     RASH :nubaine     Zosyn [Piperacillin-Tazobactam In D5w] Rash     Possible allergy, did have a diffuse rash that seemed drug related but could have also been related to soap in the hospital.      Bactrim [Sulfamethoxazole W-Trimethoprim] Other (See Comments) and Nausea and Vomiting     Severely low liver function.     Compazine [Prochlorperazine] Other (See Comments)     Twitching. Takes Benedryl and is fine     Tape [Adhesive Tape] Blisters     Tramadol      Zofran [Ondansetron] Other (See Comments)     migraine     Flagyl  [Metronidazole] Hives and Rash       Current Medications:   Current Outpatient Medications   Medication Sig Dispense Refill     acarbose (PRECOSE) 50 MG tablet Take 1 tablet (50 mg) by mouth 3 times daily (with meals) Increase to 100 mg three times daily as needed. 180 tablet 5     amitriptyline (ELAVIL) 50 MG tablet Take 1 tablet (50 mg) by mouth At Bedtime 90 tablet 3     amylase-lipase-protease (CREON 24) 59776-62854 units CPEP per EC capsule Take 1-2 capsules by mouth Take with snacks or supplements       amylase-lipase-protease (CREON) 98568-71257 units CPEP per EC capsule Take 3-4 capsules by mouth 3 times daily (with meals) With oral meals 150 capsule 11     azelastine (ASTELIN) 0.1 % nasal spray Spray 1 spray into both nostrils 2 times daily 1 Bottle 11     blood glucose (PAT CONTOUR NEXT) test strip Use to test blood sugar 6 times daily or as directed. 2 Box 11     blood glucose monitoring (PAT MICROLET) lancets Use to test blood sugar 6-8 times daily or as directed. 100 each 11     cetirizine (ZYRTEC) 10 MG tablet Take 1 tablet (10 mg) by mouth daily (Patient taking differently: Take 10 mg by mouth every morning ) 90 tablet 3     Continuous Blood Gluc Sensor (FREESTYLE LISA 2 SENSOR) [facility] 1 each every 14 days 2 each 11     cyclobenzaprine (FLEXERIL) 10 MG tablet Take 1 tablet (10 mg) by mouth 2 times daily as needed for muscle spasms 60 tablet 3     diphenhydrAMINE (BENADRYL ALLERGY) 25 MG capsule Take 1 capsule (25 mg) by mouth every 6 hours as needed for itching or allergies 56 capsule 0     estradiol (VAGIFEM) 10 MCG TABS vaginal tablet INSERT 1 TABLET(10 MCG) VAGINALLY 2 TIMES A WEEK 24 tablet 2     Glucagon (GVOKE HYPOPEN 1-PACK) 1 MG/0.2ML SOAJ Inject 1 mg Subcutaneous once as needed (hypoglycemia with loss of consciousness) 0.2 mL 1     glucose 40 % GEL gel Take 15-30 g by mouth every 15 minutes as needed for low blood sugar 3 Tube 2     Hydroactive Dressings (TEGADERM HYDROCOLLOID THIN) MISC  Use under sensor site , change every 14 days 2 each 11     HYDROmorphone, STANDARD CONC, (DILAUDID) 1 MG/ML oral solution Take 1-2 mLs (1-2 mg) by mouth every 4 hours as needed for moderate to severe pain (Post G and J tube placement) 168 mL 0     ibuprofen (ADVIL/MOTRIN) 400 MG tablet Take 1 tablet (400 mg) by mouth every 6 hours as needed for moderate pain 30 tablet 0     levothyroxine (SYNTHROID/LEVOTHROID) 137 MCG tablet Take 1 tablet (137 mcg) by mouth daily 90 tablet 3     Lidocaine (LIDOCARE) 4 % Patch Place 1 patch onto the skin every 24 hours To prevent lidocaine toxicity, patient should be patch free for 12 hrs daily. 10 patch 0     montelukast (SINGULAIR) 10 MG tablet TAKE 1 TABLET(10 MG) BY MOUTH AT BEDTIME 90 tablet 3     Nutritional Supplements (BOOST HIGH PROTEIN) LIQD After above baseline labs are drawn, give: 6 mL/kg to maximum of 360 mL; the beverage is to be consumed within 5 minutes.  0     omeprazole (PRILOSEC) 40 MG DR capsule Take 1 capsule (40 mg) by mouth 2 times daily (Patient taking differently: Take 40 mg by mouth every evening ) 180 capsule 3     order for DME Equipment being ordered:Cefaly 1 Device 0     oxyCODONE-acetaminophen (PERCOCET) 5-325 MG tablet Take 1 tablet by mouth every 6 hours as needed for pain 30 tablet 0     polyethylene glycol (MIRALAX) 17 GM/Dose powder Take 17 g (1 capful) by mouth daily (Patient taking differently: Take 1 capful by mouth every morning ) 507 g 11     prochlorperazine (COMPAZINE) 10 MG tablet Take 0.5 tablets (5 mg) by mouth every 6 hours as needed for nausea or vomiting 60 tablet 2     prochlorperazine (COMPAZINE) 5 MG tablet Take 1 tablet (5 mg) by mouth every 6 hours as needed Take two 5 mg tablets by mouth every six hours as needed for nausea. 60 tablet 1     promethazine (PHENERGAN) 25 MG tablet Take 1 tablet (25 mg) by mouth daily as needed 60 tablet 1     Prucalopride Succinate (MOTEGRITY) 2 MG TABS Take 2 mg by mouth daily (Patient taking  differently: Take 2 mg by mouth every morning ) 30 tablet 11     scopolamine (TRANSDERM) 1 MG/3DAYS 72 hr patch Place 1 patch onto the skin every 72 hours 10 patch 11     senna-docusate (SENOKOT-S;PERICOLACE) 8.6-50 MG per tablet Take 1-2 tablets by mouth 2 times daily (Patient taking differently: Take 1-2 tablets by mouth every morning ) 100 tablet 0     Sharps Container (BD SHARPS ) MISC 1 Container as needed 1 each 1     ZOLMitriptan (ZOMIG) 5 MG tablet Take 1 tablet (5 mg) by mouth at onset of headache for migraine 9 tablet 11          Current Pain Medications:  Medications related to Pain Management (From now, onward)    None           Past Pain Medications:    Dilaudid  Oxycodone  Cymbalta  Flexeril    Blood thinner:    None    Work History:  Past history of Hospital     Current work status: Runs own company, 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   HENT: Negative for ear discharge, ear pain, hearing loss, nosebleeds, sinus pain, sore throat and tinnitus.    Cardiovascular: Negative for chest pain, palpitations and orthopnea.   Gastrointestinal: Positive for abdominal pain, constipation, diarrhea, heartburn, nausea and vomiting. Negative for blood in stool and melena.   Musculoskeletal: Positive for back pain, myalgias and neck pain.   Neurological: Positive for headaches. Negative for dizziness, tingling, tremors, speech change, seizures, loss of consciousness and weakness.   Endo/Heme/Allergies: Negative for polydipsia.   Psychiatric/Behavioral: Negative for hallucinations and memory loss.         Physical Exam:     Vitals:    02/28/22 0814   BP: 123/83   Pulse: 98   Resp: 16   SpO2: 100%       General Appearance: No distress, seated comfortably  Mood: Euthymic  HE  ENT: Non constricted pupils  Respiratory: Non labored breathing on room air  CVS: Regular rate  GI: Stiff right abdomen, tender to mild palpation right abdomen, non-tender to palpation left abdomen, G tube in place without surrounding erythema or drainage. J tube site left abdomen appears well healed.  Skin: No rashes over exposed skin, multiple surgical scars noted over abdomen  MS: Moving all four extremities greater than antigravity, bracing self while positioning on table for examination  Neuro: Face symmetric, answering questions appropriately  Gait: non antalgic, ambulates without assistance    Laboratory results:  Recent Labs   Lab Test 02/17/22  1334 02/17/22  1244 02/17/22  1115 12/07/21  0804 12/07/21  0639   NA  --   --  138  --  139   POTASSIUM  --   --  4.0  --  3.7   CHLORIDE  --   --  102  --  104   CO2  --   --  29  --  27   ANIONGAP  --   --  7  --  8   * 174* 132*  132*   < > 101*   BUN  --   --  15  --  5*   CR  --   --  0.71  --  0.61   KASSI  --   --  9.6  --  8.8    < > = values in this interval not displayed.       CBC RESULTS:   Recent Labs   Lab Test 02/17/22  1115   WBC 5.0   RBC 4.17   HGB 13.1   HCT 40.0   MCV 96   MCH 31.4   MCHC 32.8   RDW 13.4            Imaging:       ASSESSMENT AND PLAN:     Encounter Diagnosis:    Gastroparesis  Abdominal Pain  Abdominal Muscle Spasms    Dinora Mcghee is a 56 year old y.o. old female who presents to the pain clinic with abdominal pain and gastroparesis.  The left sided abdominal pain near the J tube site location has improved significantly per patient report since the removal of the J tube, however there still remains pain/discomfort on the right side of the abdomen.    I have summarized the patient s past medical history, discussed their clinical findings and the potential differential diagnosis with the patient. Significant past medical history pertinent to the patient s current condition includes gastroparesis.  The clinical  findings reveal tenderness to mild palpation over right abdomen with a stiff right abdomen.  While positioning herself onto the examination table patient was noted to be bracing prior to any movement.  No significant pain noted on abdominal palpation to left abdomen. The differential diagnosis discussed with the patient are listed above. I have discussed anatomy and possible sources of the pain using models and/or pictures (diagrams). I have discussed multi- disciplinary pain management options withthe patient as pertaining to their case as detailed above. The pain management options we discussed included, but were not limited to the recommendations below.  I also discussed with patient the risks, benefits and alternatives to each pain management option.  All of the patient s questions and concerns were answered to the best of my ability.    RECOMMENDATIONS:     1. Medications: We recommend patient continue to utilize Flexeril and Cymbalta which they are currently prescribed.  For Cymbalta discussed initiating therapy again at 30 mg QAM x 7 days, then increasing to 60 mg QAM x 7 days.  If no significant pain improvement noted would discontinue after the 14 day trial.  Patient also recommended to take Flexeril 10 mg TID for abdominal muscle spasms.  Also mentioned obtaining lidocaine patches to trial near G tube site. Dosing, side effects, risks/benefits/alternatives were discussed with the patient in detail.    2. Physical therapy: I have refered the patient for outpatient physical therapy for stretching, strengthening and home exercise program for right sided abdominal pain. The patient will also discuss spine care and posture. Pool therapy and stretches can be considered if available.    3. Acupuncture: Instructed patient to continue acupuncture treatment they are receiving in the community.    4. Procedure: Future consideration for ultrasound guided trigger point injections to right abdominal musculature if the  above recommended therapies are not sufficient in managing/controlling pain.    Follow up: Return to clinic in 8-12 weeks if pain has not improved    ATTENDING ATTESTATION  I saw the patient along with the pain medicine resident Dr. Sebastian Deal. Please see his note above for full details.I was involved in gathering history, physical examination and development of the plan of care.

## 2022-02-28 NOTE — LETTER
2/28/2022       RE: Dinora Mcghee  [address] [facility] [address]     Dear Colleague,    Thank you for referring your patient, Dinora Mcghee, to the [facility] CLINIC FOR COMPREHENSIVE PAIN MANAGEMENT [place] at [facility]. Please see a copy of my visit note below.    Pain Clinic New Patient Consult Note:    Referring Provider: Xavier   Primary care provider: Latasha Paul    Dinora Mcghee is a 56 year old female who presents to the pain clinic with gastroparesis and abdominal pain which started in November of 2020.  Pt notes at the time she was having difficulty with maintaining nutrition, pain, vomiting, & diarrhea.  By August of 2021 she was started on a feeding tube due to nutritional concerns.  Pt states she has had a total of four procedures/revisions for her GJ/J tube.  The pain significantly increased with the placement of the J tube.  After trying to utilize the J tube patient noted intolerance due to the pain, and the J tube was removed at the end of December 2021.  Since the removal the J tube her pain has decreased from an 8/10 to a 2-3/10.  Prior to the removal of the J tube patient was started on Cymalta, but patient has stopped taking this medication.  She denies any significant side effects or adverse events with the medication, and instead stopped taking it as her pain had significantly decreased.    HPI:  Patient Supplied Answers To the [facility] Pain Questionnaire  [facility] Pain -  Patient Entered Questionnaire/Answers 2/23/2022   What number best describes your pain right now:  0 = No pain  to  10 = Worst pain imaginable 2   How would you describe the pain? cramping, sharp, cutting   Which of the following worsen your pain? lying down, exercise, coughing / sneezing, bowel movements   Which of the following improve or reduce your pain?  standing, walking, relaxation, thinking about something else   What number best describes your  average pain for the past week:  0 = No pain  to  10 = Worst pain imaginable 2   What number best describes your LOWEST pain in past 24 hours:  0 = No pain  to  10 = Worst pain imaginable 1   What number best describes your WORST pain in past 24 hours:  0 = No pain  to  10 = Worst pain imaginable 7   When is your pain worst? Night   What non-medicine treatments have you already had for your pain? physical therapy, relaxation training, acupuncture, TENS (electrical stimulator), counseling, surgery, exercise   Have you tried treating your pain with medication?  Yes   Are you currently taking medications for your pain? Yes           Pain treatments:    Herbal medicines: Tried CBD oil , no improvement  Physical therapy: None  Chiropractor: None  Pain physician: None  Surgery: None  Biofeedback: None  Medical Therapist Counseling: Completed, no improvement noted  Acupuncture: Yes, has noted improvement    Tests/Imaging reviewed with the patient:    None    Significant Medical History:   Past Medical History:   Diagnosis Date     Allergic rhinitis, cause unspecified      Allergy to other foods     strawberries, apples, celeries, alice, watermelon     Arthritis     left knee     Choledocholithiasis     long after cholecystectomy     Chronic abdominal pain      Chronic constipation      Chronic nausea      Chronic pancreatitis (H)      Degeneration of lumbar or lumbosacral intervertebral disc      Esophageal reflux     w/ hiatal hernia     Gastroparesis      Hiatal hernia      History of pituitary adenoma     s/p resection     Hypothyroidism      Migraines      Mild hyperlipidemia      On tube feeding diet     presence of GJ tube     Pancreatic disease     PD stricture, 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 endometriosis growth     APPENDECTOMY        SECTION       COLONOSCOPY       ENDOSCOPIC INSERTION TUBE GASTROSTOMY N/A 2021    Procedure: Gastrojejonostomy placement with jejunopexy, PEG tube exchange;  Surgeon: Zackery Montoya MD;  [facility]     ENDOSCOPIC RETROGRADE CHOLANGIOPANCREATOGRAM N/A 2015    Procedure: ENDOSCOPIC RETROGRADE CHOLANGIOPANCREATOGRAM;  Surgeon: Mandeep Park MD;  [facility]     ENDOSCOPIC RETROGRADE CHOLANGIOPANCREATOGRAM N/A 2016    Procedure: COMBINED ENDOSCOPIC RETROGRADE CHOLANGIOPANCREATOGRAPHY, PLACE TUBE/STENT;  Surgeon: Mandeep Park MD;  [facility]     ENDOSCOPIC RETROGRADE CHOLANGIOPANCREATOGRAM N/A 3/17/2016    Procedure: COMBINED ENDOSCOPIC RETROGRADE CHOLANGIOPANCREATOGRAPHY, REMOVE FOREIGN BODY OR STENT/TUBE;  Surgeon: Mandeep Park MD;  [facility]     ENDOSCOPIC RETROGRADE CHOLANGIOPANCREATOGRAM N/A 2016    Procedure: COMBINED ENDOSCOPIC RETROGRADE CHOLANGIOPANCREATOGRAPHY, PLACE TUBE/STENT;  Surgeon: Mandeep Park MD;  [facility]     ENDOSCOPIC RETROGRADE CHOLANGIOPANCREATOGRAM N/A 2016    Procedure: COMBINED ENDOSCOPIC RETROGRADE CHOLANGIOPANCREATOGRAPHY, REMOVE FOREIGN BODY OR STENT/TUBE;  Surgeon: Mandeep Park MD;  [facility]     ENDOSCOPIC ULTRASOUND UPPER GASTROINTESTINAL TRACT (GI) N/A 10/3/2016    Procedure: ENDOSCOPIC ULTRASOUND, ESOPHAGOSCOPY / UPPER GASTROINTESTINAL TRACT (GI);  Surgeon: Guru Jose Rodas MD;  [facility]     ENTEROSCOPY SMALL BOWEL N/A 2/3/2022    Procedure: ENTEROSCOPY with possible fistula closure;  Surgeon: Francisco Dodson MD;  Location: [facility]     ESOPHAGOSCOPY, GASTROSCOPY, DUODENOSCOPY (EGD), COMBINED N/A 2015    Procedure: COMBINED ESOPHAGOSCOPY, GASTROSCOPY, DUODENOSCOPY (EGD), REMOVE FOREIGN BODY;  Surgeon: Mandeep Park,  MD;  [facility]     ESOPHAGOSCOPY, GASTROSCOPY, DUODENOSCOPY (EGD), COMBINED N/A 10/25/2015    Procedure: COMBINED ESOPHAGOSCOPY, GASTROSCOPY, DUODENOSCOPY (EGD);  Surgeon: Sammy Amaro MD;  [facility]     ESOPHAGOSCOPY, GASTROSCOPY, DUODENOSCOPY (EGD), COMBINED N/A 10/25/2015    Procedure: COMBINED ESOPHAGOSCOPY, GASTROSCOPY, DUODENOSCOPY (EGD), BIOPSY SINGLE OR MULTIPLE;  Surgeon: Sammy Amaro MD;  [facility]     ESOPHAGOSCOPY, GASTROSCOPY, DUODENOSCOPY (EGD), DILATATION, COMBINED       EXCISE LESION TRUNK N/A 4/17/2017    Procedure: EXCISE LESION TRUNK;  Removal of Abdominal Foreign Body;  Surgeon: Nestor Phoenix MD;  [facility]     HC ESOPH/GAS REFLUX TEST W NASAL IMPED >1 HR N/A 11/19/2015    Procedure: ESOPHAGEAL IMPEDENCE FUNCTION TEST WITH 24 HOUR PH GREATER THAN 1 HOUR;  Surgeon: Thiago Apple MD;  [facility]     HC UGI ENDOSCOPY DIAG W BIOPSY  9/17/08     HC UGI ENDOSCOPY DIAG W BIOPSY  9/27/12     HC UGI ENDOSCOPY W ESOPHAGEAL DILATION BALLOON <30MM  9/17/08     HC UGI ENDOSCOPY W EUS N/A 5/5/2015    Procedure: COMBINED ENDOSCOPIC ULTRASOUND, ESOPHAGOSCOPY, GASTROSCOPY, DUODENOSCOPY (EGD);  Surgeon: Wm Dueñas MD;  [facility]     HC WRIST ARTHROSCOP,RELEASE XVERS LIG Bilateral 12/17/08     INJECT TRANSVERSUS ABDOMINIS PLANE (TAP) BLOCK BILATERAL Left 9/22/2016    Procedure: INJECT TRANSVERSUS ABDOMINIS PLANE (TAP) BLOCK BILATERAL;  Surgeon: Dickson Corrigan MD;  [facility]     laparoscopic pineda  1995     LAPAROSCOPIC HERNIORRHAPHY INCISIONAL N/A 8/23/2018    Procedure: LAPAROSCOPIC HERNIORRHAPHY INCISIONAL;  Laparoscopic Incisional Hernia Repair with Symbotex Mesh Implant;  Surgeon: Nestor Phoenix MD;  [facility]     LAPAROSCOPIC PANCREATECTOMY, TRANSPLANT AUTO ISLET CELL N/A 12/28/2016    Procedure: LAPAROSCOPIC PANCREATECTOMY, TRANSPLANT AUTO ISLET CELL;  Surgeon: Nestor Phoenix MD;  [facility]     REMOVE GASTROSTOMY  TUBE ADULT N/A 2022    Procedure: REMOVAL, GASTROSTOMY TUBE, ADULT;  Surgeon: Mandeep Park MD;  [facility]     REPLACE GASTROJEJUNOSTOMY TUBE, PERCUTANEOUS N/A 2021    Procedure: GASTROJEJUNOSTOMY TUBE PLACEMENT, PERCUTANEOUS, WITH GASTROPEXY;  Surgeon: Mandeep Park MD;  [facility]     REPLACE GASTROJEJUNOSTOMY TUBE, PERCUTANEOUS N/A 2021    Procedure: REPLACEMENT, GASTROJEJUNOSTOMY TUBE, PERCUTANEOUS;  Surgeon: Zackery Montoya MD;  [facility]     REPLACE JEJUNOSTOMY TUBE, PERCUTANEOUS N/A 9/10/2021    Procedure: UPPER ENDOSCOPY, REPLACEMENT OF PERCUTANEOUS GASTROJEJUNOSTOMY TUBE, T-TAG GASTROPEXY;  Surgeon: Zackery Montoya MD;  [facility]     REPLACE JEJUNOSTOMY TUBE, PERCUTANEOUS N/A 2021    Procedure: REPLACEMENT, JEJUNOSTOMY TUBE, PERCUTANEOUS;  Surgeon: Mandeep Park MD;  [facility]     transphenoidal pituitary resection       Z  DELIVERY ONLY       Z  DELIVERY ONLY      repeat c section with incidental cystotomy with repair     Z EXCIS PITUITARY,TRANSNASAL/SEPTAL      pituitary tumor removed for diabetes insipidus     Z TOTAL ABDOM HYSTERECTOMY      w/ bilateral salpingoophorectomy           Family History:  Family History   Problem Relation Age of Onset     Eye Disorder Father         cataract, detached retina     Myocardial Infarction Father 60     Lipids Father      Cerebrovascular Disease Father      Depression Father      Substance Abuse Father      Anesthesia Reaction Father         stroke right after surgery     Cataracts Father      Osteoarthritis Father      Ulcerative Colitis Father      Lipids Mother      Hypertension Mother      Thyroid Disease Mother      Depression Mother      Angina Mother      GERD Mother      Skin Cancer Mother      Eye Disorder Son         ptosis     Eye Disorder Paternal Grandmother         cataract     Eye Disorder Paternal Grandfather          cataract     Diabetes Paternal Grandfather      Eye Disorder Maternal Grandmother         cataract     Thyroid Disease Maternal Grandmother      Coronary Artery Disease Sister         angioplasty     GERD Sister      Substance Abuse Sister      Depression Sister      Depression Son      Anxiety Disorder Son      Thyroid Disease Sister      Diabetes Maternal Grandfather      Depression Nephew      Anxiety Disorder Nephew      Thyroid Disease Nephew      Diabetes Type 2  Cousin         paternal cousin     Heart Disease Paternal Aunt      Diabetes Paternal Aunt      Diabetes Paternal Uncle      Heart Disease Paternal Uncle           Social History:  Social History     Socioeconomic History     Marital status:      Spouse name: Norris     Number of children: 3     Years of education: 16     Highest education level: Not on file   Occupational History     Occupation: director     Employer: [employer]   Tobacco Use     Smoking status: Former Smoker     Packs/day: 0.50     Years: 6.00     Pack years: 3.00     Types: Cigarettes     Start date: 1985     Quit date: 1992     Years since quittin.1     Smokeless tobacco: Never Used     Tobacco comment: no 2nd hand   Substance and Sexual Activity     Alcohol use: Not Currently     Alcohol/week: 3.0 - 6.0 standard drinks     Types: 1 - 2 Glasses of wine, 1 - 2 Cans of beer, 1 - 2 Shots of liquor per week     Comment: none since IGG     Drug use: No     Sexual activity: Yes     Partners: Male     Birth control/protection: None     Comment: Hystectomy    Other Topics Concern     Parent/sibling w/ CABG, MI or angioplasty before 65F 55M? No      Service Not Asked     Blood Transfusions No     Caffeine Concern Not Asked     Occupational Exposure Not Asked     Hobby Hazards Not Asked     Sleep Concern Not Asked     Stress Concern Not Asked     Weight Concern Not Asked     Special Diet Not Asked     Back Care Not Asked     Exercise Not Asked     Bike Helmet Not  Asked     Seat Belt Yes     Self-Exams Not Asked   Social History Narrative     with 3 children and a dog.  No smoking, etoh or drug use.  Worked as a  for [employer] in [place] in the past.  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     Social Determinants of Health     Financial Resource Strain: Not on file   Food Insecurity: Not on file   Transportation Needs: Not on file   Physical Activity: Not on file   Stress: Not on file   Social Connections: Not on file   Intimate Partner Violence: Not on file   Housing Stability: Not on file     Social History     Social History Narrative     with 3 children and a dog.  No smoking, etoh or drug use.  Worked as a  for [employer] [place] in the past.  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          Allergies:  Allergies   Allergen Reactions     Apple Anaphylaxis     Corticosteroids Other (See Comments)     All oral, IV and injectable steroids are contraindicated (unless in life threatening situations) in [facility] transplant recipients. They can cause irreversible loss of islet cell function. Please contact patient's transplant care coordinator ADI Gaffney RN at [phone]/pager [phone] and/or endocrinologist prior to administration.       Depakote [Divalproex Sodium] Other (See Comments)     Chest pain     Zithromax [Azithromycin Dihydrate] Anaphylaxis     Noted in 4/7/08 ER     Bromfenac      Other reaction(s): Headache     Codeine      Other reaction(s): Hallucinations     Darvocet [Propoxyphene N-Apap] Itching     Hydromorphone Other (See Comments)     Loopy     Morphine Nausea and Vomiting and Rash     Nalbuphine Hcl Rash     RASH :nubaine     Zosyn [Piperacillin-Tazobactam In D5w] Rash     Possible allergy, did have a diffuse rash that seemed drug related but could have also been related to soap in the hospital.      Bactrim  [Sulfamethoxazole W-Trimethoprim] Other (See Comments) and Nausea and Vomiting     Severely low liver function.     Compazine [Prochlorperazine] Other (See Comments)     Twitching. Takes Benedryl and is fine     Tape [Adhesive Tape] Blisters     Tramadol      Zofran [Ondansetron] Other (See Comments)     migraine     Flagyl [Metronidazole] Hives and Rash       Current Medications:   Current Outpatient Medications   Medication Sig Dispense Refill     acarbose (PRECOSE) 50 MG tablet Take 1 tablet (50 mg) by mouth 3 times daily (with meals) Increase to 100 mg three times daily as needed. 180 tablet 5     amitriptyline (ELAVIL) 50 MG tablet Take 1 tablet (50 mg) by mouth At Bedtime 90 tablet 3     amylase-lipase-protease (CREON 24) 66087-93542 units CPEP per EC capsule Take 1-2 capsules by mouth Take with snacks or supplements       amylase-lipase-protease (CREON) 06181-64271 units CPEP per EC capsule Take 3-4 capsules by mouth 3 times daily (with meals) With oral meals 150 capsule 11     azelastine (ASTELIN) 0.1 % nasal spray Spray 1 spray into both nostrils 2 times daily 1 Bottle 11     blood glucose (PAT CONTOUR NEXT) test strip Use to test blood sugar 6 times daily or as directed. 2 Box 11     blood glucose monitoring (PAT MICROLET) lancets Use to test blood sugar 6-8 times daily or as directed. 100 each 11     cetirizine (ZYRTEC) 10 MG tablet Take 1 tablet (10 mg) by mouth daily (Patient taking differently: Take 10 mg by mouth every morning ) 90 tablet 3     Continuous Blood Gluc Sensor (FREESTYLE LISA 2 SENSOR) MISC 1 each every 14 days 2 each 11     cyclobenzaprine (FLEXERIL) 10 MG tablet Take 1 tablet (10 mg) by mouth 2 times daily as needed for muscle spasms 60 tablet 3     diphenhydrAMINE (BENADRYL ALLERGY) 25 MG capsule Take 1 capsule (25 mg) by mouth every 6 hours as needed for itching or allergies 56 capsule 0     estradiol (VAGIFEM) 10 MCG TABS vaginal tablet INSERT 1 TABLET(10 MCG) VAGINALLY 2 TIMES  A WEEK 24 tablet 2     Glucagon (GVOKE HYPOPEN 1-PACK) 1 MG/0.2ML SOAJ Inject 1 mg Subcutaneous once as needed (hypoglycemia with loss of consciousness) 0.2 mL 1     glucose 40 % GEL gel Take 15-30 g by mouth every 15 minutes as needed for low blood sugar 3 Tube 2     Hydroactive Dressings (TEGADERM HYDROCOLLOID THIN) MISC Use under sensor site , change every 14 days 2 each 11     HYDROmorphone, STANDARD CONC, (DILAUDID) 1 MG/ML oral solution Take 1-2 mLs (1-2 mg) by mouth every 4 hours as needed for moderate to severe pain (Post G and J tube placement) 168 mL 0     ibuprofen (ADVIL/MOTRIN) 400 MG tablet Take 1 tablet (400 mg) by mouth every 6 hours as needed for moderate pain 30 tablet 0     levothyroxine (SYNTHROID/LEVOTHROID) 137 MCG tablet Take 1 tablet (137 mcg) by mouth daily 90 tablet 3     Lidocaine (LIDOCARE) 4 % Patch Place 1 patch onto the skin every 24 hours To prevent lidocaine toxicity, patient should be patch free for 12 hrs daily. 10 patch 0     montelukast (SINGULAIR) 10 MG tablet TAKE 1 TABLET(10 MG) BY MOUTH AT BEDTIME 90 tablet 3     Nutritional Supplements (BOOST HIGH PROTEIN) LIQD After above baseline labs are drawn, give: 6 mL/kg to maximum of 360 mL; the beverage is to be consumed within 5 minutes.  0     omeprazole (PRILOSEC) 40 MG DR capsule Take 1 capsule (40 mg) by mouth 2 times daily (Patient taking differently: Take 40 mg by mouth every evening ) 180 capsule 3     order for DME Equipment being ordered:Cefaly 1 Device 0     oxyCODONE-acetaminophen (PERCOCET) 5-325 MG tablet Take 1 tablet by mouth every 6 hours as needed for pain 30 tablet 0     polyethylene glycol (MIRALAX) 17 GM/Dose powder Take 17 g (1 capful) by mouth daily (Patient taking differently: Take 1 capful by mouth every morning ) 507 g 11     prochlorperazine (COMPAZINE) 10 MG tablet Take 0.5 tablets (5 mg) by mouth every 6 hours as needed for nausea or vomiting 60 tablet 2     prochlorperazine (COMPAZINE) 5 MG tablet Take  1 tablet (5 mg) by mouth every 6 hours as needed Take two 5 mg tablets by mouth every six hours as needed for nausea. 60 tablet 1     promethazine (PHENERGAN) 25 MG tablet Take 1 tablet (25 mg) by mouth daily as needed 60 tablet 1     Prucalopride Succinate (MOTEGRITY) 2 MG TABS Take 2 mg by mouth daily (Patient taking differently: Take 2 mg by mouth every morning ) 30 tablet 11     scopolamine (TRANSDERM) 1 MG/3DAYS 72 hr patch Place 1 patch onto the skin every 72 hours 10 patch 11     senna-docusate (SENOKOT-S;PERICOLACE) 8.6-50 MG per tablet Take 1-2 tablets by mouth 2 times daily (Patient taking differently: Take 1-2 tablets by mouth every morning ) 100 tablet 0     Sharps Container (BD SHARPS ) MISC 1 Container as needed 1 each 1     ZOLMitriptan (ZOMIG) 5 MG tablet Take 1 tablet (5 mg) by mouth at onset of headache for migraine 9 tablet 11          Current Pain Medications:  Medications related to Pain Management (From now, onward)    None           Past Pain Medications:    Dilaudid  Oxycodone  Cymbalta  Flexeril    Blood thinner:    None    Work History:  Past history of Hospital     Current work status: Runs own company, 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   HENT: Negative for ear discharge, ear pain, hearing loss, nosebleeds, sinus pain, sore throat and tinnitus.    Cardiovascular: Negative for chest pain, palpitations and orthopnea.   Gastrointestinal: Positive for abdominal pain, constipation, diarrhea, heartburn, nausea and vomiting. Negative for blood in stool and melena.   Musculoskeletal: Positive for back pain, myalgias and neck pain.   Neurological: Positive for headaches. Negative for dizziness, tingling, tremors, speech change, seizures, loss of  consciousness and weakness.   Endo/Heme/Allergies: Negative for polydipsia.   Psychiatric/Behavioral: Negative for hallucinations and memory loss.         Physical Exam:     Vitals:    02/28/22 0814   BP: 123/83   Pulse: 98   Resp: 16   SpO2: 100%       General Appearance: No distress, seated comfortably  Mood: Euthymic  HE ENT: Non constricted pupils  Respiratory: Non labored breathing on room air  CVS: Regular rate  GI: Stiff right abdomen, tender to mild palpation right abdomen, non-tender to palpation left abdomen, G tube in place without surrounding erythema or drainage. J tube site left abdomen appears well healed.  Skin: No rashes over exposed skin, multiple surgical scars noted over abdomen  MS: Moving all four extremities greater than antigravity, bracing self while positioning on table for examination  Neuro: Face symmetric, answering questions appropriately  Gait: non antalgic, ambulates without assistance    Laboratory results:  Recent Labs   Lab Test 02/17/22  1334 02/17/22  1244 02/17/22  1115 12/07/21  0804 12/07/21  0639   NA  --   --  138  --  139   POTASSIUM  --   --  4.0  --  3.7   CHLORIDE  --   --  102  --  104   CO2  --   --  29  --  27   ANIONGAP  --   --  7  --  8   * 174* 132*  132*   < > 101*   BUN  --   --  15  --  5*   CR  --   --  0.71  --  0.61   KASSI  --   --  9.6  --  8.8    < > = values in this interval not displayed.       CBC RESULTS:   Recent Labs   Lab Test 02/17/22  1115   WBC 5.0   RBC 4.17   HGB 13.1   HCT 40.0   MCV 96   MCH 31.4   MCHC 32.8   RDW 13.4            Imaging:       ASSESSMENT AND PLAN:     Encounter Diagnosis:    Gastroparesis  Abdominal Pain  Abdominal Muscle Spasms    Dinora Mcghee is a 56 year old y.o. old female who presents to the pain clinic with abdominal pain and gastroparesis.  The left sided abdominal pain near the J tube site location has improved significantly per patient report since the removal of the J tube, however there still  remains pain/discomfort on the right side of the abdomen.    I have summarized the patient s past medical history, discussed their clinical findings and the potential differential diagnosis with the patient. Significant past medical history pertinent to the patient s current condition includes gastroparesis.  The clinical findings reveal tenderness to mild palpation over right abdomen with a stiff right abdomen.  While positioning herself onto the examination table patient was noted to be bracing prior to any movement.  No significant pain noted on abdominal palpation to left abdomen. The differential diagnosis discussed with the patient are listed above. I have discussed anatomy and possible sources of the pain using models and/or pictures (diagrams). I have discussed multi- disciplinary pain management options withthe patient as pertaining to their case as detailed above. The pain management options we discussed included, but were not limited to the recommendations below.  I also discussed with patient the risks, benefits and alternatives to each pain management option.  All of the patient s questions and concerns were answered to the best of my ability.    RECOMMENDATIONS:     1. Medications: We recommend patient continue to utilize Flexeril and Cymbalta which they are currently prescribed.  For Cymbalta discussed initiating therapy again at 30 mg QAM x 7 days, then increasing to 60 mg QAM x 7 days.  If no significant pain improvement noted would discontinue after the 14 day trial.  Patient also recommended to take Flexeril 10 mg TID for abdominal muscle spasms.  Also mentioned obtaining lidocaine patches to trial near G tube site. Dosing, side effects, risks/benefits/alternatives were discussed with the patient in detail.    2. Physical therapy: I have refered the patient for outpatient physical therapy for stretching, strengthening and home exercise program for right sided abdominal pain. The patient will also  discuss spine care and posture. Pool therapy and stretches can be considered if available.    3. Acupuncture: Instructed patient to continue acupuncture treatment they are receiving in the community.    4. Procedure: Future consideration for ultrasound guided trigger point injections to right abdominal musculature if the above recommended therapies are not sufficient in managing/controlling pain.    Follow up: Return to clinic in 8-12 weeks if pain has not improved    ATTENDING ATTESTATION  I saw the patient along with the pain medicine resident Dr. Sebastian Deal. Please see his note above for full details.I was involved in gathering history, physical examination and development of the plan of care.         Monika Mahajan MD

## 2022-02-28 NOTE — NURSING NOTE
Patient presents with:  Consult: [facility] NEW - gastroparesis, RM 10      Mild Pain (3)     Pain Medications     Opioid Combinations Refills Start End     oxyCODONE-acetaminophen (PERCOCET) 5-325 MG tablet    0 1/5/2022     Sig - Route: Take 1 tablet by mouth every 6 hours as needed for pain - Oral    Class: Local Print    Earliest Fill Date: 1/5/2022    Opioid Agonists Refills Start End     HYDROmorphone, STANDARD CONC, (DILAUDID) 1 MG/ML oral solution    0 11/28/2021     Sig - Route: Take 1-2 mLs (1-2 mg) by mouth every 4 hours as needed for moderate to severe pain (Post G and J tube placement) - Oral    Class: Local Print    Earliest Fill Date: 11/28/2021          What medications are you using for pain? Tylenol, percocet, lidocaine, zinc oxide    (New patients only) Have you been seen by another pain clinic/ provider? Yes came to this clinic 2015-16 - chronic pancreatitis    (Return Patients only) What refills are you needing today? Not 100% sure, depends on how the visit goes.    Patient expecting: establish pain care, see if there are suggestions to easing the discomfort - wants to stay out of the ER and hospital.

## 2022-02-28 NOTE — PROGRESS NOTES
Patient presents with:  Consult: [facility] NEW - gastroparesis      Mild Pain (3)     Pain Medications     Opioid Combinations Refills Start End     oxyCODONE-acetaminophen (PERCOCET) 5-325 MG tablet    0 1/5/2022     Sig - Route: Take 1 tablet by mouth every 6 hours as needed for pain - Oral    Class: Local Print    Earliest Fill Date: 1/5/2022    Opioid Agonists Refills Start End     HYDROmorphone, STANDARD CONC, (DILAUDID) 1 MG/ML oral solution    0 11/28/2021     Sig - Route: Take 1-2 mLs (1-2 mg) by mouth every 4 hours as needed for moderate to severe pain (Post G and J tube placement) - Oral    Class: Local Print    Earliest Fill Date: 11/28/2021          What medications are you using for pain? Tylenol, percocet, lidocaine, zinc oxide    (New patients only) Have you been seen by another pain clinic/ provider? Yes came to this clinic 2015-16 - chronic pancreatitis    (Return Patients only) What refills are you needing today? Not 100% sure, depends on how the visit goes.    Patient expecting: establish pain care, see if there are suggestions to easing the discomfort - wants to stay out of the ER and hospital.

## 2022-03-01 RX ORDER — PROMETHAZINE HYDROCHLORIDE 25 MG/1
25 TABLET ORAL DAILY PRN
Qty: 60 TABLET | Refills: 0 | Status: SHIPPED | OUTPATIENT
Start: 2022-03-01 | End: 2022-04-16

## 2022-03-01 NOTE — TELEPHONE ENCOUNTER
promethazine (PHENERGAN) 25 MG tablet  Last Written Prescription Date:   9/3/2021  Last Fill Quantity: 60,   # refills: 1  Last Office Visit :  1/17/2022  Future Office visit:   3/30/2022    Routing refill request to provider for review/approval because:  Gaps in refills.  Last Filled Sept 2021.  Refer to clinic/provider for review       Gina Delgadillo RN  Central Triage Red Flags/Med Refills

## 2022-03-02 ENCOUNTER — VIRTUAL VISIT (OUTPATIENT)
Dept: GASTROENTEROLOGY | Facility: CLINIC | Age: 57
End: 2022-03-02
Attending: INTERNAL MEDICINE
Payer: COMMERCIAL

## 2022-03-02 DIAGNOSIS — K31.84 GASTROPARESIS: Primary | ICD-10-CM

## 2022-03-02 PROCEDURE — 99213 OFFICE O/P EST LOW 20 MIN: CPT | Mod: 95 | Performed by: INTERNAL MEDICINE

## 2022-03-02 NOTE — LETTER
3/2/2022         RE: Dinora Mcghee  [address] [facility] [address]        INTERVENTIONAL ENDOSCOPY OUTPATIENT CLINIC CONSULT  DATE OF SERVICE: 3/2/2022  PROVIDER REQUESTING CONSULT: Mandeep Park MD  Reason for Consultation: gastroparesis     ASSESSMENT:  Dinora is a 56 year old female with a PMHx significant for TPIAT with course complicated by gastroparesis s/p venting G-tube who presents for follow up one month after a trial of intrapyloric Botox injection. Unfortunately, Dinora did not respond to Botox injection with fairly severe exacerbation of symptoms last week (see GCSI scores below). We discussed the heterogenous pathophysiology of gastroparesis and that only a subset of gastroparesis patient's appear to have more of a pylorospasm predominant disease that responds to pylorus-directed interventions. She does not appear to fit into this subset. Data shows non-response to botox predicts non-response to G-POEM, therefore I would recommend against pursuing this invasive procedure due to unlikely benefit. Dr. Park is working on obtaining an IND for domperidone. I would encourage pursuing further medical therapies for gastroparesis of which I am not really an expert on.     RECOMMENDATIONS:  - Not a candidate for G-POEM      Francisco Dodson MD  [facility]  Division of Gastroenterology and Hepatology  [facility] [address] [id number]    ________________________________________________________________  HPI:  Dinora is a 56 year old female with a PMHx significant for TPIAT with course complicated by gastroparesis s/p venting G-tube who presents for follow up one month after a trial of intrapyloric Botox injection. Unfortunately, this does not appear to have made any clinical improvement in her symptoms with ongoing nausea, vomiting, and bloating (see GCSI scores below). In fact, last week was a particularly difficult time where she was having to  vent her G-tube regularly and went to outpatient infusion to get IV fluids. This week is better however. The direct J-tube site that was previously removed due to discomfort has healed up well without further signs of leakage. She was recently started on Cymbalta by the pain clinic for pain around the G-tube site.     Patient reported GCSI scores from the time of her botox injection to more recently:          PMHx:  Past Medical History:   Diagnosis Date     Allergic rhinitis, cause unspecified      Allergy to other foods     strawberries, apples, celeries, alice, watermelon     Arthritis     left knee     Choledocholithiasis     long after cholecystectomy     Chronic abdominal pain      Chronic constipation      Chronic nausea      Chronic pancreatitis (H)      Degeneration of lumbar or lumbosacral intervertebral disc      Esophageal reflux     w/ hiatal hernia     Gastroparesis      Hiatal hernia      History of pituitary adenoma     s/p resection     Hypothyroidism      Migraines      Mild hyperlipidemia      On tube feeding diet     presence of GJ tube     Pancreatic disease     PD stricture, 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      PONV (postoperative nausea and vomiting)      Portacath in place      Unspecified hearing loss     25% high frequency R     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 endometriosis growth     APPENDECTOMY        SECTION       COLONOSCOPY       ENDOSCOPIC INSERTION TUBE GASTROSTOMY N/A 2021    Procedure: Gastrojejonostomy placement with jejunopexy, PEG tube exchange;  Surgeon: Zackery Montoya MD;  [facility]     ENDOSCOPIC RETROGRADE CHOLANGIOPANCREATOGRAM N/A 2015    Procedure: ENDOSCOPIC RETROGRADE CHOLANGIOPANCREATOGRAM;  Surgeon: Mandeep Park MD;  [facility]     ENDOSCOPIC RETROGRADE CHOLANGIOPANCREATOGRAM N/A 2016    Procedure: COMBINED ENDOSCOPIC RETROGRADE CHOLANGIOPANCREATOGRAPHY, PLACE TUBE/STENT;  Surgeon: Mandeep Park MD;  [facility]     ENDOSCOPIC RETROGRADE CHOLANGIOPANCREATOGRAM N/A 3/17/2016    Procedure: COMBINED ENDOSCOPIC RETROGRADE CHOLANGIOPANCREATOGRAPHY, REMOVE FOREIGN BODY OR STENT/TUBE;  Surgeon: Mandeep Park MD;  [facility]     ENDOSCOPIC RETROGRADE CHOLANGIOPANCREATOGRAM N/A 2016    Procedure: COMBINED ENDOSCOPIC RETROGRADE CHOLANGIOPANCREATOGRAPHY, PLACE TUBE/STENT;  Surgeon: Mandeep Park MD;  [facility]     ENDOSCOPIC RETROGRADE CHOLANGIOPANCREATOGRAM N/A 2016    Procedure: COMBINED ENDOSCOPIC RETROGRADE CHOLANGIOPANCREATOGRAPHY, REMOVE FOREIGN BODY OR STENT/TUBE;  Surgeon: Mandeep Park MD;  [facility]     ENDOSCOPIC ULTRASOUND UPPER GASTROINTESTINAL TRACT (GI) N/A 10/3/2016    Procedure: ENDOSCOPIC ULTRASOUND, ESOPHAGOSCOPY /  UPPER GASTROINTESTINAL TRACT (GI);  Surgeon: Guru Jose Rodas MD;  [facility]     ENTEROSCOPY SMALL BOWEL N/A 2/3/2022    Procedure: ENTEROSCOPY with possible fistula closure;  Surgeon: Francisco Dodson MD;  Location:  GI     ESOPHAGOSCOPY, GASTROSCOPY, DUODENOSCOPY (EGD), COMBINED N/A 6/24/2015    Procedure: COMBINED ESOPHAGOSCOPY, GASTROSCOPY, DUODENOSCOPY (EGD), REMOVE FOREIGN BODY;  Surgeon: Mandeep Park MD;  [facility]     ESOPHAGOSCOPY, GASTROSCOPY, DUODENOSCOPY (EGD), COMBINED N/A 10/25/2015    Procedure: COMBINED ESOPHAGOSCOPY, GASTROSCOPY, DUODENOSCOPY (EGD);  Surgeon: Sammy Amaro MD;  [facility]     ESOPHAGOSCOPY, GASTROSCOPY, DUODENOSCOPY (EGD), COMBINED N/A 10/25/2015    Procedure: COMBINED ESOPHAGOSCOPY, GASTROSCOPY, DUODENOSCOPY (EGD), BIOPSY SINGLE OR MULTIPLE;  Surgeon: Sammy Amaro MD;  [facility]     ESOPHAGOSCOPY, GASTROSCOPY, DUODENOSCOPY (EGD), DILATATION, COMBINED       EXCISE LESION TRUNK N/A 4/17/2017    Procedure: EXCISE LESION TRUNK;  Removal of Abdominal Foreign Body;  Surgeon: Nestor Phoenix MD;  [facility]     HC ESOPH/GAS REFLUX TEST W NASAL IMPED >1 HR N/A 11/19/2015    Procedure: ESOPHAGEAL IMPEDENCE FUNCTION TEST WITH 24 HOUR PH GREATER THAN 1 HOUR;  Surgeon: Thiago Apple MD;  [facility]     HC UGI ENDOSCOPY DIAG W BIOPSY  9/17/08     HC UGI ENDOSCOPY DIAG W BIOPSY  9/27/12     HC UGI ENDOSCOPY W ESOPHAGEAL DILATION BALLOON <30MM  9/17/08     HC UGI ENDOSCOPY W EUS N/A 5/5/2015    Procedure: COMBINED ENDOSCOPIC ULTRASOUND, ESOPHAGOSCOPY, GASTROSCOPY, DUODENOSCOPY (EGD);  Surgeon: Wm Dueñas MD;  [facility]     HC WRIST ARTHROSCOP,RELEASE XVERS LIG Bilateral 12/17/08     INJECT TRANSVERSUS ABDOMINIS PLANE (TAP) BLOCK BILATERAL Left 9/22/2016    Procedure: INJECT TRANSVERSUS ABDOMINIS PLANE (TAP) BLOCK BILATERAL;  Surgeon: Dickson Corrigan MD;  [facility]     laparoscopic pineda        LAPAROSCOPIC HERNIORRHAPHY INCISIONAL N/A 2018    Procedure: LAPAROSCOPIC HERNIORRHAPHY INCISIONAL;  Laparoscopic Incisional Hernia Repair with Symbotex Mesh Implant;  Surgeon: Nestor Phoenix MD;  [facility]     LAPAROSCOPIC PANCREATECTOMY, TRANSPLANT AUTO ISLET CELL N/A 2016    Procedure: LAPAROSCOPIC PANCREATECTOMY, TRANSPLANT AUTO ISLET CELL;  Surgeon: Nestor Phoenix MD;  [facility]     REMOVE GASTROSTOMY TUBE ADULT N/A 2022    Procedure: REMOVAL, GASTROSTOMY TUBE, ADULT;  Surgeon: Mandeep Park MD;  [facility]     REPLACE GASTROJEJUNOSTOMY TUBE, PERCUTANEOUS N/A 2021    Procedure: GASTROJEJUNOSTOMY TUBE PLACEMENT, PERCUTANEOUS, WITH GASTROPEXY;  Surgeon: Mandeep Park MD;  [facility]     REPLACE GASTROJEJUNOSTOMY TUBE, PERCUTANEOUS N/A 2021    Procedure: REPLACEMENT, GASTROJEJUNOSTOMY TUBE, PERCUTANEOUS;  Surgeon: Zackery Montoya MD;  [facility]     REPLACE JEJUNOSTOMY TUBE, PERCUTANEOUS N/A 9/10/2021    Procedure: UPPER ENDOSCOPY, REPLACEMENT OF PERCUTANEOUS GASTROJEJUNOSTOMY TUBE, T-TAG GASTROPEXY;  Surgeon: Zackery Montoya MD;  [facility]     REPLACE JEJUNOSTOMY TUBE, PERCUTANEOUS N/A 2021    Procedure: REPLACEMENT, JEJUNOSTOMY TUBE, PERCUTANEOUS;  Surgeon: Mandeep Park MD;  [facility]     transphenoidal pituitary resection       [facility]  DELIVERY ONLY       [facility]  DELIVERY ONLY      repeat c section with incidental cystotomy with repair     [facility] EXCIS PITUITARY,TRANSNASAL/SEPTAL      pituitary tumor removed for diabetes insipidus     [facility] TOTAL ABDOM HYSTERECTOMY      w/ bilateral salpingoophorectomy        MEDS:  Current Outpatient Medications   Medication     acarbose (PRECOSE) 50 MG tablet     amitriptyline (ELAVIL) 50 MG tablet     amylase-lipase-protease (CREON 24) 39745-65348 units CPEP per EC capsule     amylase-lipase-protease (CREON) 34735-63623 units  CPEP per EC capsule     azelastine (ASTELIN) 0.1 % nasal spray     blood glucose (PAT CONTOUR NEXT) test strip     blood glucose monitoring (PAT MICROLET) lancets     cetirizine (ZYRTEC) 10 MG tablet     Continuous Blood Gluc Sensor (FREESTYLE LISA 2 SENSOR) MISC     cyclobenzaprine (FLEXERIL) 10 MG tablet     diphenhydrAMINE (BENADRYL ALLERGY) 25 MG capsule     estradiol (VAGIFEM) 10 MCG TABS vaginal tablet     Glucagon (GVOKE HYPOPEN 1-PACK) 1 MG/0.2ML SOAJ     glucose 40 % GEL gel     Hydroactive Dressings (TEGADERM HYDROCOLLOID THIN) MISC     HYDROmorphone, STANDARD CONC, (DILAUDID) 1 MG/ML oral solution     ibuprofen (ADVIL/MOTRIN) 400 MG tablet     levothyroxine (SYNTHROID/LEVOTHROID) 137 MCG tablet     Lidocaine (LIDOCARE) 4 % Patch     montelukast (SINGULAIR) 10 MG tablet     Nutritional Supplements (BOOST HIGH PROTEIN) LIQD     omeprazole (PRILOSEC) 40 MG DR capsule     order for DME     oxyCODONE-acetaminophen (PERCOCET) 5-325 MG tablet     polyethylene glycol (MIRALAX) 17 GM/Dose powder     prochlorperazine (COMPAZINE) 10 MG tablet     prochlorperazine (COMPAZINE) 5 MG tablet     promethazine (PHENERGAN) 25 MG tablet     Prucalopride Succinate (MOTEGRITY) 2 MG TABS     scopolamine (TRANSDERM) 1 MG/3DAYS 72 hr patch     senna-docusate (SENOKOT-S;PERICOLACE) 8.6-50 MG per tablet     Sharps Container (BD SHARPS ) MISC     ZOLMitriptan (ZOMIG) 5 MG tablet     No current facility-administered medications for this visit.     ALLERGIES:    Allergies   Allergen Reactions     Apple Anaphylaxis     Corticosteroids Other (See Comments)     All oral, IV and injectable steroids are contraindicated (unless in life threatening situations) in Islet Auto transplant recipients. They can cause irreversible loss of islet cell function. Please contact patient's transplant care coordinator ADI Gaffney RN at [phone]/pager [phone] and/or endocrinologist prior to administration.       Depakote  [Divalproex Sodium] Other (See Comments)     Chest pain     Zithromax [Azithromycin Dihydrate] Anaphylaxis     Noted in 4/7/08 ER     Bromfenac      Other reaction(s): Headache     Codeine      Other reaction(s): Hallucinations     Darvocet [Propoxyphene N-Apap] Itching     Hydromorphone Other (See Comments)     Loopy     Morphine Nausea and Vomiting and Rash     Nalbuphine Hcl Rash     RASH :nubaine     Zosyn [Piperacillin-Tazobactam In D5w] Rash     Possible allergy, did have a diffuse rash that seemed drug related but could have also been related to soap in the hospital.      Bactrim [Sulfamethoxazole W-Trimethoprim] Other (See Comments) and Nausea and Vomiting     Severely low liver function.     Compazine [Prochlorperazine] Other (See Comments)     Twitching. Takes Benedryl and is fine     Tape [Adhesive Tape] Blisters     Tramadol      Zofran [Ondansetron] Other (See Comments)     migraine     Flagyl [Metronidazole] Hives and Rash     FHx:  Family History   Problem Relation Age of Onset     Eye Disorder Father         cataract, detached retina     Myocardial Infarction Father 60     Lipids Father      Cerebrovascular Disease Father      Depression Father      Substance Abuse Father      Anesthesia Reaction Father         stroke right after surgery     Cataracts Father      Osteoarthritis Father      Ulcerative Colitis Father      Lipids Mother      Hypertension Mother      Thyroid Disease Mother      Depression Mother      Angina Mother      GERD Mother      Skin Cancer Mother      Eye Disorder Son         ptosis     Eye Disorder Paternal Grandmother         cataract     Eye Disorder Paternal Grandfather         cataract     Diabetes Paternal Grandfather      Eye Disorder Maternal Grandmother         cataract     Thyroid Disease Maternal Grandmother      Coronary Artery Disease Sister         angioplasty     GERD Sister      Substance Abuse Sister      Depression Sister      Depression Son      Anxiety  Disorder Son      Thyroid Disease Sister      Diabetes Maternal Grandfather      Depression Nephew      Anxiety Disorder Nephew      Thyroid Disease Nephew      Diabetes Type 2  Cousin         paternal cousin     Heart Disease Paternal Aunt      Diabetes Paternal Aunt      Diabetes Paternal Uncle      Heart Disease Paternal Uncle        SOCIAL Hx:  Social History     Socioeconomic History     Marital status:      Spouse name: Norris     Number of children: 3     Years of education: 16     Highest education level: Not on file   Occupational History     Occupation: director     Employer: [employer]   Tobacco Use     Smoking status: Former Smoker     Packs/day: 0.50     Years: 6.00     Pack years: 3.00     Types: Cigarettes     Start date: 1985     Quit date: 1992     Years since quittin.1     Smokeless tobacco: Never Used     Tobacco comment: no 2nd hand   Substance and Sexual Activity     Alcohol use: Not Currently     Alcohol/week: 3.0 - 6.0 standard drinks     Types: 1 - 2 Glasses of wine, 1 - 2 Cans of beer, 1 - 2 Shots of liquor per week     Comment: none since IGG     Drug use: No     Sexual activity: Yes     Partners: Male     Birth control/protection: None     Comment: Hystectomy    Other Topics Concern     Parent/sibling w/ CABG, MI or angioplasty before 65F 55M? No      Service Not Asked     Blood Transfusions No     Caffeine Concern Not Asked     Occupational Exposure Not Asked     Hobby Hazards Not Asked     Sleep Concern Not Asked     Stress Concern Not Asked     Weight Concern Not Asked     Special Diet Not Asked     Back Care Not Asked     Exercise Not Asked     Bike Helmet Not Asked     Seat Belt Yes     Self-Exams Not Asked   Social History Narrative     with 3 children and a dog.  No smoking, etoh or drug use.  Worked as a  for [employer] in [place] in the past.  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  Lungs: no respiratory distress    LABS:  Lab on 02/17/2022   Component Date Value Ref Range Status     Lauric Acid C12 0 Test 02/17/2022 29  6 - 90 nmol/mL Final     Myristic Acid C14 0 Test 02/17/2022 158  30 - 450 nmol/mL Final     Hexadecenoic Acid Test 02/17/2022 48  25 - 105 nmol/mL Final     Palmitoleic Acid Test 02/17/2022 347  110 - 1130 nmol/mL Final     Palmitic Acid Test 02/17/2022 2534  1480 - 3730 nmol/mL Final     G Linolenic Acid Test 02/17/2022 61  16 - 150 nmol/mL Final     A linolenic Acid Test 02/17/2022 115  50 - 130 nmol/mL Final     Linoleic Acid Test 02/17/2022 4369 (A) 2270 - 3850 nmol/mL Final     Oleic Acid Test 02/17/2022 2122  650 - 3500 nmol/mL Final     Vaccenic Acid Test 02/17/2022 169 (A) 280 - 740 nmol/mL Final     Stearic Acid Test 02/17/2022 1257 (A) 590 - 1170 nmol/mL Final     EPA C20 5W3 Test 02/17/2022 157 (A) 14 - 100 nmol/mL Final     Arachidonic Acid Test 02/17/2022 1281  520 - 1490 nmol/mL Final     [place] Acid Test 02/17/2022 38 (A) 7 - 30 nmol/mL Final     H G Linolenic Test 02/17/2022 226  50 - 250 nmol/mL Final     Arachidic Acid Test 02/17/2022 31 (A) 50 - 90 nmol/mL Final     DHA C22 6W3 Test 02/17/2022 159  30 - 250 nmol/mL Final     DPA C22 5W6 Test 02/17/2022 30  10 - 70 nmol/mL Final     DPA C22 5W3 Test 02/17/2022 99  20 - 210 nmol/mL Final     DTA C22 4W6 Test 02/17/2022 37  10 - 80 nmol/mL Final     Docosenoic Acid Test 02/17/2022 5  4 - 13 nmol/mL Final      Nervonic Acid Test 02/17/2022 111 (A) 60 - 100 nmol/mL Final     Triene Tetraene Ratio Test 02/17/2022 0.030  0.010 - 0.038 Final     Total Saturated Acid Test 02/17/2022 4.2  2.5 - 5.5 mmol/L Final     Total Monounsat Acid Test 02/17/2022 2.8  1.3 - 5.8 mmol/L Final     Total Polyunsat Acid Test 02/17/2022 6.6 (A) 3.2 - 5.8 mmol/L Final     Total W3 Test 02/17/2022 0.5  0.2 - 0.5 mmol/L Final     Total W6 Test 02/17/2022 6.0 (A) 3.0 - 5.4 mmol/L Final     Total Fatty Acids Test 02/17/2022 13.6  7.3 - 16.8 mmol/L Final     Interpretation Test 02/17/2022 SEE NOTE   Final    In this sample, the concentrations of linoleic and   alpha-linolenic acids were not reduced.  The   triene/tetraene ratio was not elevated. These findings are   not suggestive of a nutritional deficiency of essential   fatty acids.     -------------------ADDITIONAL INFORMATION-------------------  Gas Chromatography-Mass Spectrometry (GC-MS) Stable Isotope   Dilution Analysis  This test was developed and its performance characteristics   determined by [facility] in a manner consistent with CLIA   requirements. This test has not been cleared or approved by   the U.S. Food and Drug Administration.     Test Performed by:  [facility] [place] [address]  : Estrada Corbett M.D. Ph.D.; CLIA# [id number]     Glucose 02/17/2022 132 (A) 70 - 99 mg/dL Final     Patient Fasting > 8hrs? 02/17/2022 Yes   Final     C Peptide 02/17/2022 1.6  0.9 - 6.9 ng/mL Final     Hemoglobin A1C 02/17/2022 5.4  0.0 - 5.6 % Final    Normal <5.7%   Prediabetes 5.7-6.4%    Diabetes 6.5% or higher     Note: Adopted from ADA consensus guidelines.     Vitamin A 02/17/2022 0.61  0.30 - 1.20 mg/L Final     Retinol Palmitate 02/17/2022 <0.02  0.00 - 0.10 mg/L Final     Vitamin A Interp 02/17/2022 Normal   Final      This test was developed and its performance characteristics   determined by [facility]. It  has not been cleared or   approved by the US Food and Drug Administration. This test   was performed in a CLIA certified laboratory and is   intended for clinical purposes.  Performed By: [facility]  [address] [place] [address]  : Mira Garcia MD     Vitamin B12 02/17/2022 616  193 - 986 pg/mL Final     Vitamin E 02/17/2022 13.6  5.5 - 18.0 mg/L Final      This test was developed and its performance characteristics   determined by [facility]. It has not been cleared or   approved by the US Food and Drug Administration. This test   was performed in a CLIA certified laboratory and is   intended for clinical purposes.     Vitamin E Gamma 02/17/2022 0.6  0.0 - 6.0 mg/L Final    Performed By: [facility]  [address] [place] [address]  : Mira Garcia MD     25 OH Vitamin D2 02/17/2022 <5  ug/L Final     25 OH Vitamin D3 02/17/2022 49  ug/L Final     25 OH Vit D Total 02/17/2022 <54  20 - 75 ug/L Final    Season, race, dietary intake, and treatment affect the concentration of 25-hydroxy-Vitamin D. Values may decrease during winter months and increase during summer months. Values 20-29 ug/L may indicate Vitamin D insufficiency and values <20 ug/L may indicate Vitamin D deficiency.     Sodium 02/17/2022 138  133 - 144 mmol/L Final     Potassium 02/17/2022 4.0  3.4 - 5.3 mmol/L Final     Chloride 02/17/2022 102  94 - 109 mmol/L Final     Carbon Dioxide (CO2) 02/17/2022 29  20 - 32 mmol/L Final     Anion Gap 02/17/2022 7  3 - 14 mmol/L Final     Urea Nitrogen 02/17/2022 15  7 - 30 mg/dL Final     Creatinine 02/17/2022 0.71  0.52 - 1.04 mg/dL Final     Calcium 02/17/2022 9.6  8.5 - 10.1 mg/dL Final     Glucose 02/17/2022 132 (A) 70 - 99 mg/dL Final     Alkaline Phosphatase 02/17/2022 98  40 - 150 U/L Final     AST 02/17/2022 23  0 - 45 U/L Final     ALT 02/17/2022 31  0 - 50 U/L Final     Protein Total 02/17/2022 7.8  6.8 - 8.8 g/dL  Final     Albumin 02/17/2022 4.2  3.4 - 5.0 g/dL Final     Bilirubin Total 02/17/2022 0.9  0.2 - 1.3 mg/dL Final     GFR Estimate 02/17/2022 >90  >60 mL/min/1.73m2 Final    Effective December 21, 2021 eGFRcr in adults is calculated using the 2021 CKD-EPI creatinine equation which includes age and gender (Richard et al., NEJ, DOI: 10.1056/MCETlg0628122)     Prealbumin 02/17/2022 25  15 - 45 mg/dL Final     Iron 02/17/2022 91  35 - 180 ug/dL Final     Iron Binding Capacity 02/17/2022 266  240 - 430 ug/dL Final     Iron Sat Index 02/17/2022 34  15 - 46 % Final     Ferritin 02/17/2022 236  8 - 252 ng/mL Final     Folate RBC 02/17/2022 702  >=366 ng/mL Final    Performed By: [facility]  [address] [place] [id number]  : Mira Garcia MD     Zinc, Serum/Plasma 02/17/2022 70.1  60.0 - 120.0 ug/dL Final    Comment: INTERPRETIVE INFORMATION: Zinc, Serum or Plasma    Elevated results may be due to skin or collection-related   contamination, including the use of a noncertified   metal-free collection/transport tube. If contamination   concerns exist due to elevated levels of serum/plasma zinc,   confirmation with a second specimen collected in a   certified metal-free tube is recommended.    Circulating zinc concentrations are dependent on albumin   status and are depressed with malnutrition.  Zinc may also   be lowered with infection, inflammation, stress, oral   contraceptives, and pregnancy.  Zinc may be elevated with   zinc supplementation or fasting.  Elevated zinc   concentrations may interfere with copper absorption.     This test was developed and its performance characteristics   determined by [facility]. It has not been cleared or   approved by the US Food and Drug Administration. This test   was performed in a CLIA certified laboratory and is   intended for clinical                            purposes.  Performed By: [facility]  [address] [place] [address]  [id number]  : Mira Garcia MD     WBC Count 02/17/2022 5.0  4.0 - 11.0 10e3/uL Final     RBC Count 02/17/2022 4.17  3.80 - 5.20 10e6/uL Final     Hemoglobin 02/17/2022 13.1  11.7 - 15.7 g/dL Final     Hematocrit 02/17/2022 40.0  35.0 - 47.0 % Final     MCV 02/17/2022 96  78 - 100 fL Final     MCH 02/17/2022 31.4  26.5 - 33.0 pg Final     MCHC 02/17/2022 32.8  31.5 - 36.5 g/dL Final     RDW 02/17/2022 13.4  10.0 - 15.0 % Final     Platelet Count 02/17/2022 389  150 - 450 10e3/uL Final     % Neutrophils 02/17/2022 54  % Final     % Lymphocytes 02/17/2022 34  % Final     % Monocytes 02/17/2022 9  % Final     % Eosinophils 02/17/2022 1  % Final     % Basophils 02/17/2022 2  % Final     % Immature Granulocytes 02/17/2022 0  % Final     NRBCs per 100 WBC 02/17/2022 0  <1 /100 Final     Absolute Neutrophils 02/17/2022 2.7  1.6 - 8.3 10e3/uL Final     Absolute Lymphocytes 02/17/2022 1.7  0.8 - 5.3 10e3/uL Final     Absolute Monocytes 02/17/2022 0.5  0.0 - 1.3 10e3/uL Final     Absolute Eosinophils 02/17/2022 0.1  0.0 - 0.7 10e3/uL Final     Absolute Basophils 02/17/2022 0.1  0.0 - 0.2 10e3/uL Final     Absolute Immature Granulocytes 02/17/2022 0.0  <=0.4 10e3/uL Final     Absolute NRBCs 02/17/2022 0.0  10e3/uL Final     Glucose 02/17/2022 174 (A) 70 - 99 mg/dL Final     Patient Fasting > 8hrs? 02/17/2022 Unknown   Final     C Peptide 02/17/2022 2.3  0.9 - 6.9 ng/mL Final     Glucose 02/17/2022 137 (A) 70 - 99 mg/dL Final     Patient Fasting > 8hrs? 02/17/2022 Unknown   Final     C Peptide 02/17/2022 2.5  0.9 - 6.9 ng/mL Final       Endoscopies:  [facility] 02/03/2022  1:50 PM Fransisco Simpsons   [address] [address] [place]  [address]   _______________________________________________________________________________   Patient Name: Dinora Mcghee           Procedure Date: 2/3/2022 1:50 PM   MRN: [id number]                       Account Number: [id number]   YOB: 1965               Admit Type: Outpatient   Age: 56                               Room: special procedure room #1   Note Status: Finalized                Attending MD: Francisco Dodson MD   Instrument Name: Max PCF-[id number] EGD     _______________________________________________________________________________       Procedure:                Small bowel enteroscopy   Indications:              For therapy of gastroparesis, 57yo woman post TPIAT                             with course complicated by gastroparesis s/p                             direct-G tube and direct-J tube. Direct J-tube                             created significant problems and was subsequently                             removed on 1/28/22. She then had leaking from the                             prior PEJ site. However, this has since 'dried' up                             without further drainage for the past couple of                             days. Plan for Botox injection into the pylorus as                             well as assess PEJ site.   Providers:                Francisco Dodson MD, Yoselyn Wolff RN, Michelle Rodriguez, MONICA   Referring MD:             Latasha Paul NP   Medicines:                General Anesthesia   Complications:            No immediate complications. Estimated blood loss:                             Minimal.   _______________________________________________________________________________   Procedure:                After obtaining informed consent, the endoscope was                             passed under direct vision. Throughout the                             procedure, the patient's blood pressure, pulse, and                             oxygen saturations were monitored continuously. The                             Colonoscope was introduced through the mouth and                             advanced to the afferent and efferent jejunal loop.                             After obtaining  informed consent, the endoscope was                             passed under direct vision. Throughout the                             procedure, the patient's blood pressure, pulse, and                             oxygen saturations were monitored continuously.The                             small bowel enteroscopy was accomplished without                             difficulty. The patient tolerated the procedure                             well.                                                                                     Findings:        The esophagus was normal.        There was evidence of an intact gastrostomy with a patent G-tube present        in the gastric antrum. This was characterized by healthy appearing        mucosa.        The pylorus was normal. Area was successfully injected with 200 units        botulinum toxin in four quadrants in 1 mL aliquots.        Pediatric colonoscope advanced into the small bowel. There was evidence        of a widely patent duodenoenterostomy in the duodenal bulb. This was        characterized by healthy appearing mucosa.        There was evidence of a widely patent jejunojejunostomy in the proximal        jejunum. This was characterized by healthy appearing mucosa. The site of        where her likely prior PEJ was visualized endoscopically. No overt        communication to the skin seen. Leak test performed by insufflating CO2        into the bowel lumen and submerging the PEJ site on the skin underwater.        No bubbles appreciated                                                                                     Impression:               - Normal esophagus.                             - Intact gastrostomy with a patent G-tube present                             characterized by healthy appearing mucosa.                             - Normal pylorus. Injected with 200 units of                             botulinum toxin.                             - Widely  patent duodenoenterostomy, characterized                             by healthy appearing mucosa was found.                             - Widely patent enteroenterostomy, characterized by                             healthy appearing mucosa was found in the jejunum.                             - Probably prior PEJ site visualized                             endoscopically. No persistent fistula seen.                             Negative leak test. Consitent with clinical                             resolution of leakage.                             - No specimens collected.   Recommendation:           - Discharge patient to home (ambulatory).                             - Monitor for clinical improvement in gastroparesis                             symptoms following botox injection. Will follow up                             in clinic in ~1 month to reassess. If response to                             botox, then can consider G-POEM for definative                             management                             - G-tube may be used for venting as before                             - Continue to leave dressing on PEJ site. If not                             further leakage for the next two weeks, can likely                             remove.                             - Resume other diet and medications                             - Above discussed with patient and family                                                                                       Francisco Dodson MD   ________________

## 2022-03-02 NOTE — PROGRESS NOTES
Dinora is a 56 year old who is being evaluated via a billable video visit.      How would you like to obtain your AVS? MyChart  If the video visit is dropped, the invitation should be resent by: Text to cell phone: [phone]   Will anyone else be joining your video visit? Evangelina       Tamika REYGilbert Mendieta VF    Video Start Time: 9:10 AM  Video-Visit Details    Type of service:  Video Visit    Video End Time:9:19 AM    Originating Location (pt. Location): Home    Distant Location (provider location):  [facility] [place] [facility]     Platform used for Video Visit: [facility]     INTERVENTIONAL ENDOSCOPY OUTPATIENT CLINIC CONSULT  DATE OF SERVICE: 3/2/2022  PROVIDER REQUESTING CONSULT: Mandeep Park MD  Reason for Consultation: gastroparesis     ASSESSMENT:  Dinora is a 56 year old female with a PMHx significant for TPIAT with course complicated by gastroparesis s/p venting G-tube who presents for follow up one month after a trial of intrapyloric Botox injection. Unfortunately, Dinora did not respond to Botox injection with fairly severe exacerbation of symptoms last week (see GCSI scores below). We discussed the heterogenous pathophysiology of gastroparesis and that only a subset of gastroparesis patient's appear to have more of a pylorospasm predominant disease that responds to pylorus-directed interventions. She does not appear to fit into this subset. Data shows non-response to botox predicts non-response to G-POEM, therefore I would recommend against pursuing this invasive procedure due to unlikely benefit. Dr. Park is working on obtaining an IND for domperidone. I would encourage pursuing further medical therapies for gastroparesis of which I am not really an expert on.     RECOMMENDATIONS:  - Not a candidate for G-POEM      Franicsco Dodson MD  [facility]  Division of Gastroenterology and Hepatology  [facility] [address] [place], [place]  [address]    ________________________________________________________________  HPI:  Dinora is a 56 year old female with a PMHx significant for TPIAT with course complicated by gastroparesis s/p venting G-tube who presents for follow up one month after a trial of intrapyloric Botox injection. Unfortunately, this does not appear to have made any clinical improvement in her symptoms with ongoing nausea, vomiting, and bloating (see GCSI scores below). In fact, last week was a particularly difficult time where she was having to vent her G-tube regularly and went to outpatient infusion to get IV fluids. This week is better however. The direct J-tube site that was previously removed due to discomfort has healed up well without further signs of leakage. She was recently started on Cymbalta by the pain clinic for pain around the G-tube site.     Patient reported GCSI scores from the time of her botox injection to more recently:          PMHx:  Past Medical History:   Diagnosis Date     Allergic rhinitis, cause unspecified      Allergy to other foods     strawberries, apples, celeries, alice, watermelon     Arthritis     left knee     Choledocholithiasis     long after cholecystectomy     Chronic abdominal pain      Chronic constipation      Chronic nausea      Chronic pancreatitis (H)      Degeneration of lumbar or lumbosacral intervertebral disc      Esophageal reflux     w/ hiatal hernia     Gastroparesis      Hiatal hernia      History of pituitary adenoma     s/p resection     Hypothyroidism      Migraines      Mild hyperlipidemia      On tube feeding diet     presence of GJ tube     Pancreatic disease     PD stricture, 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      PONV (postoperative nausea and vomiting)      Portacath in place      Unspecified hearing loss     25% high frequency R     Patient Active Problem List   Diagnosis     Hypothyroidism     Need for prophylactic immunotherapy     Sprain and strain of other specified  sites of hip and thigh     Chondromalacia of patella     Sensorineural hearing loss     Vertiginous syndrome and labyrinthine disorder     Lumbago     Allergic rhinitis due to other allergen     GERD (gastroesophageal reflux disease)     Other type of intractable migraine     History of ERCP     Abdominal pain     S/P ERCP     Post-pancreatectomy diabetes (H)     Pancreatic insufficiency     ACP (advance care planning)     Gastroparesis     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 endometriosis growth     APPENDECTOMY        SECTION       COLONOSCOPY       ENDOSCOPIC INSERTION TUBE GASTROSTOMY N/A 2021    Procedure: Gastrojejonostomy placement with jejunopexy, PEG tube exchange;  Surgeon: Zackery Montoya MD;  [facility]     ENDOSCOPIC RETROGRADE CHOLANGIOPANCREATOGRAM N/A 2015    Procedure: ENDOSCOPIC RETROGRADE CHOLANGIOPANCREATOGRAM;  Surgeon: Mandeep Park MD;  [facility]     ENDOSCOPIC RETROGRADE CHOLANGIOPANCREATOGRAM N/A 2016    Procedure: COMBINED ENDOSCOPIC RETROGRADE CHOLANGIOPANCREATOGRAPHY, PLACE TUBE/STENT;  Surgeon: Mandeep Park MD;  [facility]     ENDOSCOPIC RETROGRADE CHOLANGIOPANCREATOGRAM N/A 3/17/2016    Procedure: COMBINED ENDOSCOPIC RETROGRADE CHOLANGIOPANCREATOGRAPHY, REMOVE FOREIGN BODY OR STENT/TUBE;  Surgeon: Mandeep Park MD;  [facility]     ENDOSCOPIC RETROGRADE  CHOLANGIOPANCREATOGRAM N/A 8/2/2016    Procedure: COMBINED ENDOSCOPIC RETROGRADE CHOLANGIOPANCREATOGRAPHY, PLACE TUBE/STENT;  Surgeon: Mandeep Park MD;  [facility]     ENDOSCOPIC RETROGRADE CHOLANGIOPANCREATOGRAM N/A 8/26/2016    Procedure: COMBINED ENDOSCOPIC RETROGRADE CHOLANGIOPANCREATOGRAPHY, REMOVE FOREIGN BODY OR STENT/TUBE;  Surgeon: Mandeep Park MD;  [facility]     ENDOSCOPIC ULTRASOUND UPPER GASTROINTESTINAL TRACT (GI) N/A 10/3/2016    Procedure: ENDOSCOPIC ULTRASOUND, ESOPHAGOSCOPY / UPPER GASTROINTESTINAL TRACT (GI);  Surgeon: Guru Jose Rodas MD;  [facility]     ENTEROSCOPY SMALL BOWEL N/A 2/3/2022    Procedure: ENTEROSCOPY with possible fistula closure;  Surgeon: Francisco Dodson MD;  Location:  GI     ESOPHAGOSCOPY, GASTROSCOPY, DUODENOSCOPY (EGD), COMBINED N/A 6/24/2015    Procedure: COMBINED ESOPHAGOSCOPY, GASTROSCOPY, DUODENOSCOPY (EGD), REMOVE FOREIGN BODY;  Surgeon: Mandeep Park MD;  [facility]     ESOPHAGOSCOPY, GASTROSCOPY, DUODENOSCOPY (EGD), COMBINED N/A 10/25/2015    Procedure: COMBINED ESOPHAGOSCOPY, GASTROSCOPY, DUODENOSCOPY (EGD);  Surgeon: Sammy Amaro MD;  [facility]     ESOPHAGOSCOPY, GASTROSCOPY, DUODENOSCOPY (EGD), COMBINED N/A 10/25/2015    Procedure: COMBINED ESOPHAGOSCOPY, GASTROSCOPY, DUODENOSCOPY (EGD), BIOPSY SINGLE OR MULTIPLE;  Surgeon: Sammy Amaro MD;  [facility]     ESOPHAGOSCOPY, GASTROSCOPY, DUODENOSCOPY (EGD), DILATATION, COMBINED       EXCISE LESION TRUNK N/A 4/17/2017    Procedure: EXCISE LESION TRUNK;  Removal of Abdominal Foreign Body;  Surgeon: Nestor Phoenix MD;  [facility]     HC ESOPH/GAS REFLUX TEST W NASAL IMPED >1 HR N/A 11/19/2015    Procedure: ESOPHAGEAL IMPEDENCE FUNCTION TEST WITH 24 HOUR PH GREATER THAN 1 HOUR;  Surgeon: Thiago Apple MD;  [facility]     HC UGI ENDOSCOPY DIAG W BIOPSY  9/17/08     HC UGI ENDOSCOPY DIAG W BIOPSY  9/27/12      HC UGI ENDOSCOPY W ESOPHAGEAL DILATION BALLOON <30MM  08     HC UGI ENDOSCOPY W EUS N/A 2015    Procedure: COMBINED ENDOSCOPIC ULTRASOUND, ESOPHAGOSCOPY, GASTROSCOPY, DUODENOSCOPY (EGD);  Surgeon: Wm Dueñas MD;  [facility]     HC WRIST ARTHROSCOP,RELEASE XVERS LIG Bilateral 08     INJECT TRANSVERSUS ABDOMINIS PLANE (TAP) BLOCK BILATERAL Left 2016    Procedure: INJECT TRANSVERSUS ABDOMINIS PLANE (TAP) BLOCK BILATERAL;  Surgeon: Dickson Corrigan MD;  [facility]     laparoscopic pineda       LAPAROSCOPIC HERNIORRHAPHY INCISIONAL N/A 2018    Procedure: LAPAROSCOPIC HERNIORRHAPHY INCISIONAL;  Laparoscopic Incisional Hernia Repair with Symbotex Mesh Implant;  Surgeon: Nestor Phoenix MD;  [facility]     LAPAROSCOPIC PANCREATECTOMY, TRANSPLANT AUTO ISLET CELL N/A 2016    Procedure: LAPAROSCOPIC PANCREATECTOMY, TRANSPLANT AUTO ISLET CELL;  Surgeon: Nestor Phoenix MD;  [facility]     REMOVE GASTROSTOMY TUBE ADULT N/A 2022    Procedure: REMOVAL, GASTROSTOMY TUBE, ADULT;  Surgeon: Mandeep Park MD;  [facility]     REPLACE GASTROJEJUNOSTOMY TUBE, PERCUTANEOUS N/A 2021    Procedure: GASTROJEJUNOSTOMY TUBE PLACEMENT, PERCUTANEOUS, WITH GASTROPEXY;  Surgeon: Mandeep Park MD;  [facility]     REPLACE GASTROJEJUNOSTOMY TUBE, PERCUTANEOUS N/A 2021    Procedure: REPLACEMENT, GASTROJEJUNOSTOMY TUBE, PERCUTANEOUS;  Surgeon: Zackery Montoya MD;  [facility]     REPLACE JEJUNOSTOMY TUBE, PERCUTANEOUS N/A 9/10/2021    Procedure: UPPER ENDOSCOPY, REPLACEMENT OF PERCUTANEOUS GASTROJEJUNOSTOMY TUBE, T-TAG GASTROPEXY;  Surgeon: Zackery Montoya MD;  [facility]     REPLACE JEJUNOSTOMY TUBE, PERCUTANEOUS N/A 2021    Procedure: REPLACEMENT, JEJUNOSTOMY TUBE, PERCUTANEOUS;  Surgeon: Mandeep Park MD;  [facility]     transphenoidal pituitary resection       [facility]  DELIVERY ONLY       [facility]   DELIVERY ONLY      repeat c section with incidental cystotomy with repair     ZZC EXCIS PITUITARY,TRANSNASAL/SEPTAL      pituitary tumor removed for diabetes insipidus     ZZC TOTAL ABDOM HYSTERECTOMY      w/ bilateral salpingoophorectomy        MEDS:  Current Outpatient Medications   Medication     acarbose (PRECOSE) 50 MG tablet     amitriptyline (ELAVIL) 50 MG tablet     amylase-lipase-protease (CREON 24) 05593-47511 units CPEP per EC capsule     amylase-lipase-protease (CREON) 12332-31150 units CPEP per EC capsule     azelastine (ASTELIN) 0.1 % nasal spray     blood glucose (PAT CONTOUR NEXT) test strip     blood glucose monitoring (PAT MICROLET) lancets     cetirizine (ZYRTEC) 10 MG tablet     Continuous Blood Gluc Sensor (FREESTYLE LISA 2 SENSOR) MISC     cyclobenzaprine (FLEXERIL) 10 MG tablet     diphenhydrAMINE (BENADRYL ALLERGY) 25 MG capsule     estradiol (VAGIFEM) 10 MCG TABS vaginal tablet     Glucagon (GVOKE HYPOPEN 1-PACK) 1 MG/0.2ML SOAJ     glucose 40 % GEL gel     Hydroactive Dressings (TEGADERM HYDROCOLLOID THIN) MISC     HYDROmorphone, STANDARD CONC, (DILAUDID) 1 MG/ML oral solution     ibuprofen (ADVIL/MOTRIN) 400 MG tablet     levothyroxine (SYNTHROID/LEVOTHROID) 137 MCG tablet     Lidocaine (LIDOCARE) 4 % Patch     montelukast (SINGULAIR) 10 MG tablet     Nutritional Supplements (BOOST HIGH PROTEIN) LIQD     omeprazole (PRILOSEC) 40 MG DR capsule     order for DME     oxyCODONE-acetaminophen (PERCOCET) 5-325 MG tablet     polyethylene glycol (MIRALAX) 17 GM/Dose powder     prochlorperazine (COMPAZINE) 10 MG tablet     prochlorperazine (COMPAZINE) 5 MG tablet     promethazine (PHENERGAN) 25 MG tablet     Prucalopride Succinate (MOTEGRITY) 2 MG TABS     scopolamine (TRANSDERM) 1 MG/3DAYS 72 hr patch     senna-docusate (SENOKOT-S;PERICOLACE) 8.6-50 MG per tablet     Sharps Container (BD SHARPS ) MISC     ZOLMitriptan (ZOMIG) 5 MG tablet     No current  facility-administered medications for this visit.     ALLERGIES:    Allergies   Allergen Reactions     Apple Anaphylaxis     Corticosteroids Other (See Comments)     All oral, IV and injectable steroids are contraindicated (unless in life threatening situations) in Islet Auto transplant recipients. They can cause irreversible loss of islet cell function. Please contact patient's transplant care coordinator ADI Gaffney RN at [phone]/pager [phone] and/or endocrinologist prior to administration.       Depakote [Divalproex Sodium] Other (See Comments)     Chest pain     Zithromax [Azithromycin Dihydrate] Anaphylaxis     Noted in 4/7/08 ER     Bromfenac      Other reaction(s): Headache     Codeine      Other reaction(s): Hallucinations     Darvocet [Propoxyphene N-Apap] Itching     Hydromorphone Other (See Comments)     Loopy     Morphine Nausea and Vomiting and Rash     Nalbuphine Hcl Rash     RASH :nubaine     Zosyn [Piperacillin-Tazobactam In D5w] Rash     Possible allergy, did have a diffuse rash that seemed drug related but could have also been related to soap in the hospital.      Bactrim [Sulfamethoxazole W-Trimethoprim] Other (See Comments) and Nausea and Vomiting     Severely low liver function.     Compazine [Prochlorperazine] Other (See Comments)     Twitching. Takes Benedryl and is fine     Tape [Adhesive Tape] Blisters     Tramadol      Zofran [Ondansetron] Other (See Comments)     migraine     Flagyl [Metronidazole] Hives and Rash     FHx:  Family History   Problem Relation Age of Onset     Eye Disorder Father         cataract, detached retina     Myocardial Infarction Father 60     Lipids Father      Cerebrovascular Disease Father      Depression Father      Substance Abuse Father      Anesthesia Reaction Father         stroke right after surgery     Cataracts Father      Osteoarthritis Father      Ulcerative Colitis Father      Lipids Mother      Hypertension Mother      Thyroid Disease  Mother      Depression Mother      Angina Mother      GERD Mother      Skin Cancer Mother      Eye Disorder Son         ptosis     Eye Disorder Paternal Grandmother         cataract     Eye Disorder Paternal Grandfather         cataract     Diabetes Paternal Grandfather      Eye Disorder Maternal Grandmother         cataract     Thyroid Disease Maternal Grandmother      Coronary Artery Disease Sister         angioplasty     GERD Sister      Substance Abuse Sister      Depression Sister      Depression Son      Anxiety Disorder Son      Thyroid Disease Sister      Diabetes Maternal Grandfather      Depression Nephew      Anxiety Disorder Nephew      Thyroid Disease Nephew      Diabetes Type 2  Cousin         paternal cousin     Heart Disease Paternal Aunt      Diabetes Paternal Aunt      Diabetes Paternal Uncle      Heart Disease Paternal Uncle        SOCIAL Hx:  Social History     Socioeconomic History     Marital status:      Spouse name: Norris     Number of children: 3     Years of education: 16     Highest education level: Not on file   Occupational History     Occupation: director     Employer: [employer]   Tobacco Use     Smoking status: Former Smoker     Packs/day: 0.50     Years: 6.00     Pack years: 3.00     Types: Cigarettes     Start date: 1985     Quit date: 1992     Years since quittin.1     Smokeless tobacco: Never Used     Tobacco comment: no 2nd hand   Substance and Sexual Activity     Alcohol use: Not Currently     Alcohol/week: 3.0 - 6.0 standard drinks     Types: 1 - 2 Glasses of wine, 1 - 2 Cans of beer, 1 - 2 Shots of liquor per week     Comment: none since IGG     Drug use: No     Sexual activity: Yes     Partners: Male     Birth control/protection: None     Comment: Hystectomy    Other Topics Concern     Parent/sibling w/ CABG, MI or angioplasty before 65F 55M? No      Service Not Asked     Blood Transfusions No     Caffeine Concern Not Asked     Occupational  Exposure Not Asked     Hobby Hazards Not Asked     Sleep Concern Not Asked     Stress Concern Not Asked     Weight Concern Not Asked     Special Diet Not Asked     Back Care Not Asked     Exercise Not Asked     Bike Helmet Not Asked     Seat Belt Yes     Self-Exams Not Asked   Social History Narrative     with 3 children and a dog.  No smoking, etoh or drug use.  Worked as a  for [employer] in [place] in the past.  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     Social Determinants of Health     Financial Resource Strain: Not on file   Food Insecurity: Not on file   Transportation Needs: Not on file   Physical Activity: Not on file   Stress: Not on file   Social Connections: Not on file   Intimate Partner Violence: Not on file   Housing Stability: Not on file       ROS: A comprehensive Review of Systems was asked and answered in the negative unless specifically commented upon in the HPI    Physical Exam  Gen: A&Ox3, NAD  HEENT: Moist mucus membranes, no scleral icterus.  Lungs: no respiratory distress    LABS:  Lab on 02/17/2022   Component Date Value Ref Range Status     Lauric Acid C12 0 Test 02/17/2022 29  6 - 90 nmol/mL Final     Myristic Acid C14 0 Test 02/17/2022 158  30 - 450 nmol/mL Final     Hexadecenoic Acid Test 02/17/2022 48  25 - 105 nmol/mL Final     Palmitoleic Acid Test 02/17/2022 347  110 - 1130 nmol/mL Final     Palmitic Acid Test 02/17/2022 2534  1480 - 3730 nmol/mL Final     G Linolenic Acid Test 02/17/2022 61  16 - 150 nmol/mL Final     A linolenic Acid Test 02/17/2022 115  50 - 130 nmol/mL Final     Linoleic Acid Test 02/17/2022 4369 (A) 2270 - 3850 nmol/mL Final     Oleic Acid Test 02/17/2022 2122  650 - 3500 nmol/mL Final     Vaccenic Acid Test 02/17/2022 169 (A) 280 - 740 nmol/mL Final     Stearic Acid Test 02/17/2022 1257 (A) 590 - 1170 nmol/mL Final     EPA C20 5W3 Test 02/17/2022 157 (A) 14 - 100 nmol/mL Final     Arachidonic Acid  Test 02/17/2022 1281  520 - 1490 nmol/mL Final     [place] Acid Test 02/17/2022 38 (A) 7 - 30 nmol/mL Final     H G Linolenic Test 02/17/2022 226  50 - 250 nmol/mL Final     Arachidic Acid Test 02/17/2022 31 (A) 50 - 90 nmol/mL Final     DHA C22 6W3 Test 02/17/2022 159  30 - 250 nmol/mL Final     DPA C22 5W6 Test 02/17/2022 30  10 - 70 nmol/mL Final     DPA C22 5W3 Test 02/17/2022 99  20 - 210 nmol/mL Final     DTA C22 4W6 Test 02/17/2022 37  10 - 80 nmol/mL Final     Docosenoic Acid Test 02/17/2022 5  4 - 13 nmol/mL Final     Nervonic Acid Test 02/17/2022 111 (A) 60 - 100 nmol/mL Final     Triene Tetraene Ratio Test 02/17/2022 0.030  0.010 - 0.038 Final     Total Saturated Acid Test 02/17/2022 4.2  2.5 - 5.5 mmol/L Final     Total Monounsat Acid Test 02/17/2022 2.8  1.3 - 5.8 mmol/L Final     Total Polyunsat Acid Test 02/17/2022 6.6 (A) 3.2 - 5.8 mmol/L Final     Total W3 Test 02/17/2022 0.5  0.2 - 0.5 mmol/L Final     Total W6 Test 02/17/2022 6.0 (A) 3.0 - 5.4 mmol/L Final     Total Fatty Acids Test 02/17/2022 13.6  7.3 - 16.8 mmol/L Final     Interpretation Test 02/17/2022 SEE NOTE   Final    In this sample, the concentrations of linoleic and   alpha-linolenic acids were not reduced.  The   triene/tetraene ratio was not elevated. These findings are   not suggestive of a nutritional deficiency of essential   fatty acids.     -------------------ADDITIONAL INFORMATION-------------------  Gas Chromatography-Mass Spectrometry (GC-MS) Stable Isotope   Dilution Analysis  This test was developed and its performance characteristics   determined by [facility] in a manner consistent with CLIA   requirements. This test has not been cleared or approved by   the U.S. Food and Drug Administration.     Test Performed by:  [place] [address]  : Estrada Corbett M.D. Ph.D.; CLIA# [id number]     Glucose 02/17/2022 132 (A) 70 - 99 mg/dL Final      Patient Fasting > 8hrs? 02/17/2022 Yes   Final     C Peptide 02/17/2022 1.6  0.9 - 6.9 ng/mL Final     Hemoglobin A1C 02/17/2022 5.4  0.0 - 5.6 % Final    Normal <5.7%   Prediabetes 5.7-6.4%    Diabetes 6.5% or higher     Note: Adopted from ADA consensus guidelines.     Vitamin A 02/17/2022 0.61  0.30 - 1.20 mg/L Final     Retinol Palmitate 02/17/2022 <0.02  0.00 - 0.10 mg/L Final     Vitamin A Interp 02/17/2022 Normal   Final      This test was developed and its performance characteristics   determined by [facility]. It has not been cleared or   approved by the US Food and Drug Administration. This test   was performed in a CLIA certified laboratory and is   intended for clinical purposes.  Performed By: [facility]  [address] [place], [place] [id number]  : Mira Garcia MD     Vitamin B12 02/17/2022 616  193 - 986 pg/mL Final     Vitamin E 02/17/2022 13.6  5.5 - 18.0 mg/L Final      This test was developed and its performance characteristics   determined by [facility]. It has not been cleared or   approved by the US Food and Drug Administration. This test   was performed in a CLIA certified laboratory and is   intended for clinical purposes.     Vitamin E Gamma 02/17/2022 0.6  0.0 - 6.0 mg/L Final    Performed By: [facility]  [address] [place], [place] [id number]  : Mira Garcia MD     25 [place] Vitamin D2 02/17/2022 <5  ug/L Final     25 OH Vitamin D3 02/17/2022 49  ug/L Final     25 OH Vit D Total 02/17/2022 <54  20 - 75 ug/L Final    Season, race, dietary intake, and treatment affect the concentration of 25-hydroxy-Vitamin D. Values may decrease during winter months and increase during summer months. Values 20-29 ug/L may indicate Vitamin D insufficiency and values <20 ug/L may indicate Vitamin D deficiency.     Sodium 02/17/2022 138  133 - 144 mmol/L Final     Potassium 02/17/2022 4.0  3.4 - 5.3 mmol/L Final     Chloride 02/17/2022 102   94 - 109 mmol/L Final     Carbon Dioxide (CO2) 02/17/2022 29  20 - 32 mmol/L Final     Anion Gap 02/17/2022 7  3 - 14 mmol/L Final     Urea Nitrogen 02/17/2022 15  7 - 30 mg/dL Final     Creatinine 02/17/2022 0.71  0.52 - 1.04 mg/dL Final     Calcium 02/17/2022 9.6  8.5 - 10.1 mg/dL Final     Glucose 02/17/2022 132 (A) 70 - 99 mg/dL Final     Alkaline Phosphatase 02/17/2022 98  40 - 150 U/L Final     AST 02/17/2022 23  0 - 45 U/L Final     ALT 02/17/2022 31  0 - 50 U/L Final     Protein Total 02/17/2022 7.8  6.8 - 8.8 g/dL Final     Albumin 02/17/2022 4.2  3.4 - 5.0 g/dL Final     Bilirubin Total 02/17/2022 0.9  0.2 - 1.3 mg/dL Final     GFR Estimate 02/17/2022 >90  >60 mL/min/1.73m2 Final    Effective December 21, 2021 eGFRcr in adults is calculated using the 2021 CKD-EPI creatinine equation which includes age and gender (Richard et al., NEJ, DOI: 10.1056/SXQBfr0137002)     Prealbumin 02/17/2022 25  15 - 45 mg/dL Final     Iron 02/17/2022 91  35 - 180 ug/dL Final     Iron Binding Capacity 02/17/2022 266  240 - 430 ug/dL Final     Iron Sat Index 02/17/2022 34  15 - 46 % Final     Ferritin 02/17/2022 236  8 - 252 ng/mL Final     Folate RBC 02/17/2022 702  >=366 ng/mL Final    Performed By: [facility]  [address] [place] [id number]  : Mira Garcia MD     Zinc, Serum/Plasma 02/17/2022 70.1  60.0 - 120.0 ug/dL Final    Comment: INTERPRETIVE INFORMATION: Zinc, Serum or Plasma    Elevated results may be due to skin or collection-related   contamination, including the use of a noncertified   metal-free collection/transport tube. If contamination   concerns exist due to elevated levels of serum/plasma zinc,   confirmation with a second specimen collected in a   certified metal-free tube is recommended.    Circulating zinc concentrations are dependent on albumin   status and are depressed with malnutrition.  Zinc may also   be lowered with infection, inflammation, stress, oral    contraceptives, and pregnancy.  Zinc may be elevated with   zinc supplementation or fasting.  Elevated zinc   concentrations may interfere with copper absorption.     This test was developed and its performance characteristics   determined by [facility]. It has not been cleared or   approved by the US Food and Drug Administration. This test   was performed in a CLIA certified laboratory and is   intended for clinical                            purposes.  Performed By: [facility]  [address] [place] [id number]  : Mira Garcia MD     WBC Count 02/17/2022 5.0  4.0 - 11.0 10e3/uL Final     RBC Count 02/17/2022 4.17  3.80 - 5.20 10e6/uL Final     Hemoglobin 02/17/2022 13.1  11.7 - 15.7 g/dL Final     Hematocrit 02/17/2022 40.0  35.0 - 47.0 % Final     MCV 02/17/2022 96  78 - 100 fL Final     MCH 02/17/2022 31.4  26.5 - 33.0 pg Final     MCHC 02/17/2022 32.8  31.5 - 36.5 g/dL Final     RDW 02/17/2022 13.4  10.0 - 15.0 % Final     Platelet Count 02/17/2022 389  150 - 450 10e3/uL Final     % Neutrophils 02/17/2022 54  % Final     % Lymphocytes 02/17/2022 34  % Final     % Monocytes 02/17/2022 9  % Final     % Eosinophils 02/17/2022 1  % Final     % Basophils 02/17/2022 2  % Final     % Immature Granulocytes 02/17/2022 0  % Final     NRBCs per 100 WBC 02/17/2022 0  <1 /100 Final     Absolute Neutrophils 02/17/2022 2.7  1.6 - 8.3 10e3/uL Final     Absolute Lymphocytes 02/17/2022 1.7  0.8 - 5.3 10e3/uL Final     Absolute Monocytes 02/17/2022 0.5  0.0 - 1.3 10e3/uL Final     Absolute Eosinophils 02/17/2022 0.1  0.0 - 0.7 10e3/uL Final     Absolute Basophils 02/17/2022 0.1  0.0 - 0.2 10e3/uL Final     Absolute Immature Granulocytes 02/17/2022 0.0  <=0.4 10e3/uL Final     Absolute NRBCs 02/17/2022 0.0  10e3/uL Final     Glucose 02/17/2022 174 (A) 70 - 99 mg/dL Final     Patient Fasting > 8hrs? 02/17/2022 Unknown   Final     C Peptide 02/17/2022 2.3  0.9 - 6.9 ng/mL Final      Glucose 02/17/2022 137 (A) 70 - 99 mg/dL Final     Patient Fasting > 8hrs? 02/17/2022 Unknown   Final     C Peptide 02/17/2022 2.5  0.9 - 6.9 ng/mL Final       Endoscopies:  [facility] 02/03/2022  1:50 PM [facility]   [address] [place] [address] [place]  [address]   _______________________________________________________________________________   Patient Name: Dinora Mcghee           Procedure Date: 2/3/2022 1:50 PM   MRN: [id number]                       Account Number: [id number]   YOB: 1965              Admit Type: Outpatient   Age: 56                               Room: special procedure room #1   Note Status: Finalized                Attending MD: Francisco Dodson MD   Instrument Name: 410 [id number] EGD     _______________________________________________________________________________       Procedure:                Small bowel enteroscopy   Indications:              For therapy of gastroparesis, 55yo woman post TPIAT                             with course complicated by gastroparesis s/p                             direct-G tube and direct-J tube. Direct J-tube                             created significant problems and was subsequently                             removed on 1/28/22. She then had leaking from the                             prior PEJ site. However, this has since 'dried' up                             without further drainage for the past couple of                             days. Plan for Botox injection into the pylorus as                             well as assess PEJ site.   Providers:                Francisco Dodson MD, Yoselyn Wolff RN, Michelle Rodriguez RN   Referring MD:             Latasha Paul NP   Medicines:                General Anesthesia   Complications:            No immediate complications. Estimated blood loss:                             Minimal.    _______________________________________________________________________________   Procedure:                After obtaining informed consent, the endoscope was                             passed under direct vision. Throughout the                             procedure, the patient's blood pressure, pulse, and                             oxygen saturations were monitored continuously. The                             Colonoscope was introduced through the mouth and                             advanced to the afferent and efferent jejunal loop.                             After obtaining informed consent, the endoscope was                             passed under direct vision. Throughout the                             procedure, the patient's blood pressure, pulse, and                             oxygen saturations were monitored continuously.The                             small bowel enteroscopy was accomplished without                             difficulty. The patient tolerated the procedure                             well.                                                                                     Findings:        The esophagus was normal.        There was evidence of an intact gastrostomy with a patent G-tube present        in the gastric antrum. This was characterized by healthy appearing        mucosa.        The pylorus was normal. Area was successfully injected with 200 units        botulinum toxin in four quadrants in 1 mL aliquots.        Pediatric colonoscope advanced into the small bowel. There was evidence        of a widely patent duodenoenterostomy in the duodenal bulb. This was        characterized by healthy appearing mucosa.        There was evidence of a widely patent jejunojejunostomy in the proximal        jejunum. This was characterized by healthy appearing mucosa. The site of        where her likely prior PEJ was visualized endoscopically. No overt        communication to the skin  seen. Leak test performed by insufflating CO2        into the bowel lumen and submerging the PEJ site on the skin underwater.        No bubbles appreciated                                                                                     Impression:               - Normal esophagus.                             - Intact gastrostomy with a patent G-tube present                             characterized by healthy appearing mucosa.                             - Normal pylorus. Injected with 200 units of                             botulinum toxin.                             - Widely patent duodenoenterostomy, characterized                             by healthy appearing mucosa was found.                             - Widely patent enteroenterostomy, characterized by                             healthy appearing mucosa was found in the jejunum.                             - Probably prior PEJ site visualized                             endoscopically. No persistent fistula seen.                             Negative leak test. Consitent with clinical                             resolution of leakage.                             - No specimens collected.   Recommendation:           - Discharge patient to home (ambulatory).                             - Monitor for clinical improvement in gastroparesis                             symptoms following botox injection. Will follow up                             in clinic in ~1 month to reassess. If response to                             botox, then can consider G-POEM for definative                             management                             - G-tube may be used for venting as before                             - Continue to leave dressing on PEJ site. If not                             further leakage for the next two weeks, can likely                             remove.                             - Resume other diet and medications                             -  Above discussed with patient and family                                                                                       Francisco Dodson MD   ________________

## 2022-03-02 NOTE — LETTER
3/2/2022         RE: Dinora Mcghee  [address] [facility] [id number]        Dear Colleague,    Thank you for referring your patient, Dinora Mcghee, to the [facility]. Please see a copy of my visit note below.    INTERVENTIONAL ENDOSCOPY OUTPATIENT CLINIC CONSULT  DATE OF SERVICE: 3/2/2022  PROVIDER REQUESTING CONSULT: Mandeep Park MD  Reason for Consultation: gastroparesis     ASSESSMENT:  Dinora is a 56 year old female with a PMHx significant for TPIAT with course complicated by gastroparesis s/p venting G-tube who presents for follow up one month after a trial of intrapyloric Botox injection. Unfortunately, Dinora did not respond to Botox injection with fairly severe exacerbation of symptoms last week (see GCSI scores below). We discussed the heterogenous pathophysiology of gastroparesis and that only a subset of gastroparesis patient's appear to have more of a pylorospasm predominant disease that responds to pylorus-directed interventions. She does not appear to fit into this subset. Data shows non-response to botox predicts non-response to G-POEM, therefore I would recommend against pursuing this invasive procedure due to unlikely benefit. Dr. Park is working on obtaining an IND for domperidone. I would encourage pursuing further medical therapies for gastroparesis of which I am not really an expert on.     RECOMMENDATIONS:  - Not a candidate for G-POEM      Francisco Dodson MD  [facility]  Division of Gastroenterology and Hepatology  [facility] [address] [id number]    ________________________________________________________________  HPI:  Dinora is a 56 year old female with a PMHx significant for TPIAT with course complicated by gastroparesis s/p venting G-tube who presents for follow up one month after a trial of intrapyloric Botox injection. Unfortunately, this does not appear to have made any clinical  improvement in her symptoms with ongoing nausea, vomiting, and bloating (see GCSI scores below). In fact, last week was a particularly difficult time where she was having to vent her G-tube regularly and went to outpatient infusion to get IV fluids. This week is better however. The direct J-tube site that was previously removed due to discomfort has healed up well without further signs of leakage. She was recently started on Cymbalta by the pain clinic for pain around the G-tube site.     Patient reported GCSI scores from the time of her botox injection to more recently:          PMHx:  Past Medical History:   Diagnosis Date     Allergic rhinitis, cause unspecified      Allergy to other foods     strawberries, apples, celeries, alice, watermelon     Arthritis     left knee     Choledocholithiasis     long after cholecystectomy     Chronic abdominal pain      Chronic constipation      Chronic nausea      Chronic pancreatitis (H)      Degeneration of lumbar or lumbosacral intervertebral disc      Esophageal reflux     w/ hiatal hernia     Gastroparesis      Hiatal hernia      History of pituitary adenoma     s/p resection     Hypothyroidism      Migraines      Mild hyperlipidemia      On tube feeding diet     presence of GJ tube     Pancreatic disease     PD stricture, 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      PONV (postoperative nausea and vomiting)      Portacath in place      Unspecified hearing loss     25% high frequency R     Patient Active Problem List   Diagnosis     Hypothyroidism     Need for prophylactic immunotherapy     Sprain and strain of other specified sites of hip and thigh     Chondromalacia of patella     Sensorineural hearing loss     Vertiginous syndrome and labyrinthine disorder     Lumbago     Allergic rhinitis due to other allergen     GERD (gastroesophageal reflux disease)     Other type of intractable migraine     History of ERCP     Abdominal pain     S/P ERCP     Post-pancreatectomy  diabetes (H)     Pancreatic insufficiency     ACP (advance care planning)     Gastroparesis     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 endometriosis growth     APPENDECTOMY        SECTION       COLONOSCOPY       ENDOSCOPIC INSERTION TUBE GASTROSTOMY N/A 2021    Procedure: Gastrojejonostomy placement with jejunopexy, PEG tube exchange;  Surgeon: Zackery Montoya MD;  [facility]     ENDOSCOPIC RETROGRADE CHOLANGIOPANCREATOGRAM N/A 2015    Procedure: ENDOSCOPIC RETROGRADE CHOLANGIOPANCREATOGRAM;  Surgeon: Mandeep Park MD;  [facility]     ENDOSCOPIC RETROGRADE CHOLANGIOPANCREATOGRAM N/A 2016    Procedure: COMBINED ENDOSCOPIC RETROGRADE CHOLANGIOPANCREATOGRAPHY, PLACE TUBE/STENT;  Surgeon: Mandeep Park MD;  [facility]     ENDOSCOPIC RETROGRADE CHOLANGIOPANCREATOGRAM N/A 3/17/2016    Procedure: COMBINED ENDOSCOPIC RETROGRADE CHOLANGIOPANCREATOGRAPHY, REMOVE FOREIGN BODY OR STENT/TUBE;  Surgeon: Mandeep Park MD;  [facility]     ENDOSCOPIC RETROGRADE CHOLANGIOPANCREATOGRAM N/A 2016    Procedure: COMBINED ENDOSCOPIC RETROGRADE CHOLANGIOPANCREATOGRAPHY, PLACE TUBE/STENT;  Surgeon: Mandeep Park MD;  [facility]     ENDOSCOPIC RETROGRADE CHOLANGIOPANCREATOGRAM N/A 2016    Procedure: COMBINED ENDOSCOPIC RETROGRADE CHOLANGIOPANCREATOGRAPHY, REMOVE FOREIGN BODY OR STENT/TUBE;  Surgeon:  Mandeep Park MD;  [facility]     ENDOSCOPIC ULTRASOUND UPPER GASTROINTESTINAL TRACT (GI) N/A 10/3/2016    Procedure: ENDOSCOPIC ULTRASOUND, ESOPHAGOSCOPY / UPPER GASTROINTESTINAL TRACT (GI);  Surgeon: Guru Jose Rodas MD;  [facility]     ENTEROSCOPY SMALL BOWEL N/A 2/3/2022    Procedure: ENTEROSCOPY with possible fistula closure;  Surgeon: Francisco Dodson MD;  Location:  GI     ESOPHAGOSCOPY, GASTROSCOPY, DUODENOSCOPY (EGD), COMBINED N/A 6/24/2015    Procedure: COMBINED ESOPHAGOSCOPY, GASTROSCOPY, DUODENOSCOPY (EGD), REMOVE FOREIGN BODY;  Surgeon: Mandeep Park MD;  [facility]     ESOPHAGOSCOPY, GASTROSCOPY, DUODENOSCOPY (EGD), COMBINED N/A 10/25/2015    Procedure: COMBINED ESOPHAGOSCOPY, GASTROSCOPY, DUODENOSCOPY (EGD);  Surgeon: Sammy Amaro MD;  [facility]     ESOPHAGOSCOPY, GASTROSCOPY, DUODENOSCOPY (EGD), COMBINED N/A 10/25/2015    Procedure: COMBINED ESOPHAGOSCOPY, GASTROSCOPY, DUODENOSCOPY (EGD), BIOPSY SINGLE OR MULTIPLE;  Surgeon: Sammy Amaro MD;  [facility]     ESOPHAGOSCOPY, GASTROSCOPY, DUODENOSCOPY (EGD), DILATATION, COMBINED       EXCISE LESION TRUNK N/A 4/17/2017    Procedure: EXCISE LESION TRUNK;  Removal of Abdominal Foreign Body;  Surgeon: Nestor Phoenix MD;  [facility]     HC ESOPH/GAS REFLUX TEST W NASAL IMPED >1 HR N/A 11/19/2015    Procedure: ESOPHAGEAL IMPEDENCE FUNCTION TEST WITH 24 HOUR PH GREATER THAN 1 HOUR;  Surgeon: Thiago Apple MD;  [facility] GI     HC UGI ENDOSCOPY DIAG W BIOPSY  9/17/08     HC UGI ENDOSCOPY DIAG W BIOPSY  9/27/12     HC UGI ENDOSCOPY W ESOPHAGEAL DILATION BALLOON <30MM  9/17/08     HC UGI ENDOSCOPY W EUS N/A 5/5/2015    Procedure: COMBINED ENDOSCOPIC ULTRASOUND, ESOPHAGOSCOPY, GASTROSCOPY, DUODENOSCOPY (EGD);  Surgeon: Wm Dueñas MD;  [facility]     HC WRIST ARTHROSCOP,RELEASE XVERS LIG Bilateral 12/17/08     INJECT TRANSVERSUS ABDOMINIS PLANE (TAP)  BLOCK BILATERAL Left 2016    Procedure: INJECT TRANSVERSUS ABDOMINIS PLANE (TAP) BLOCK BILATERAL;  Surgeon: Dickson Corrigan MD;  [facility]     laparoscopic pineda       LAPAROSCOPIC HERNIORRHAPHY INCISIONAL N/A 2018    Procedure: LAPAROSCOPIC HERNIORRHAPHY INCISIONAL;  Laparoscopic Incisional Hernia Repair with Symbotex Mesh Implant;  Surgeon: Nestor Phoenix MD;  [facility]     LAPAROSCOPIC PANCREATECTOMY, TRANSPLANT AUTO ISLET CELL N/A 2016    Procedure: LAPAROSCOPIC PANCREATECTOMY, TRANSPLANT AUTO ISLET CELL;  Surgeon: Nestor Phoenix MD;  [facility]     REMOVE GASTROSTOMY TUBE ADULT N/A 2022    Procedure: REMOVAL, GASTROSTOMY TUBE, ADULT;  Surgeon: Mandeep Park MD;  [facility]     REPLACE GASTROJEJUNOSTOMY TUBE, PERCUTANEOUS N/A 2021    Procedure: GASTROJEJUNOSTOMY TUBE PLACEMENT, PERCUTANEOUS, WITH GASTROPEXY;  Surgeon: Mandeep Park MD;  [facility]     REPLACE GASTROJEJUNOSTOMY TUBE, PERCUTANEOUS N/A 2021    Procedure: REPLACEMENT, GASTROJEJUNOSTOMY TUBE, PERCUTANEOUS;  Surgeon: Zackery Montoya MD;  [facility]     REPLACE JEJUNOSTOMY TUBE, PERCUTANEOUS N/A 9/10/2021    Procedure: UPPER ENDOSCOPY, REPLACEMENT OF PERCUTANEOUS GASTROJEJUNOSTOMY TUBE, T-TAG GASTROPEXY;  Surgeon: Zackery Montoya MD;  [facility]     REPLACE JEJUNOSTOMY TUBE, PERCUTANEOUS N/A 2021    Procedure: REPLACEMENT, JEJUNOSTOMY TUBE, PERCUTANEOUS;  Surgeon: Mandeep Park MD;  [facility]     transphenoidal pituitary resection       Z  DELIVERY ONLY       Z  DELIVERY ONLY      repeat c section with incidental cystotomy with repair     Z EXCIS PITUITARY,TRANSNASAL/SEPTAL      pituitary tumor removed for diabetes insipidus     Z TOTAL ABDOM HYSTERECTOMY      w/ bilateral salpingoophorectomy        MEDS:  Current Outpatient Medications   Medication     acarbose (PRECOSE) 50 MG tablet      amitriptyline (ELAVIL) 50 MG tablet     amylase-lipase-protease (CREON 24) 29447-55860 units CPEP per EC capsule     amylase-lipase-protease (CREON) 40126-88100 units CPEP per EC capsule     azelastine (ASTELIN) 0.1 % nasal spray     blood glucose (PAT CONTOUR NEXT) test strip     blood glucose monitoring (PAT MICROLET) lancets     cetirizine (ZYRTEC) 10 MG tablet     Continuous Blood Gluc Sensor (FREESTYLE LISA 2 SENSOR) MISC     cyclobenzaprine (FLEXERIL) 10 MG tablet     diphenhydrAMINE (BENADRYL ALLERGY) 25 MG capsule     estradiol (VAGIFEM) 10 MCG TABS vaginal tablet     Glucagon (GVOKE HYPOPEN 1-PACK) 1 MG/0.2ML SOAJ     glucose 40 % GEL gel     Hydroactive Dressings (TEGADERM HYDROCOLLOID THIN) MISC     HYDROmorphone, STANDARD CONC, (DILAUDID) 1 MG/ML oral solution     ibuprofen (ADVIL/MOTRIN) 400 MG tablet     levothyroxine (SYNTHROID/LEVOTHROID) 137 MCG tablet     Lidocaine (LIDOCARE) 4 % Patch     montelukast (SINGULAIR) 10 MG tablet     Nutritional Supplements (BOOST HIGH PROTEIN) LIQD     omeprazole (PRILOSEC) 40 MG DR capsule     order for DME     oxyCODONE-acetaminophen (PERCOCET) 5-325 MG tablet     polyethylene glycol (MIRALAX) 17 GM/Dose powder     prochlorperazine (COMPAZINE) 10 MG tablet     prochlorperazine (COMPAZINE) 5 MG tablet     promethazine (PHENERGAN) 25 MG tablet     Prucalopride Succinate (MOTEGRITY) 2 MG TABS     scopolamine (TRANSDERM) 1 MG/3DAYS 72 hr patch     senna-docusate (SENOKOT-S;PERICOLACE) 8.6-50 MG per tablet     Sharps Container (BD SHARPS ) MISC     ZOLMitriptan (ZOMIG) 5 MG tablet     No current facility-administered medications for this visit.     ALLERGIES:    Allergies   Allergen Reactions     Apple Anaphylaxis     Corticosteroids Other (See Comments)     All oral, IV and injectable steroids are contraindicated (unless in life threatening situations) in Islet Auto transplant recipients. They can cause irreversible loss of islet cell function. Please  contact patient's transplant care coordinator ADI Gaffney RN at [phone]/pager [phone] and/or endocrinologist prior to administration.       Depakote [Divalproex Sodium] Other (See Comments)     Chest pain     Zithromax [Azithromycin Dihydrate] Anaphylaxis     Noted in 4/7/08 ER     Bromfenac      Other reaction(s): Headache     Codeine      Other reaction(s): Hallucinations     Darvocet [Propoxyphene N-Apap] Itching     Hydromorphone Other (See Comments)     Loopy     Morphine Nausea and Vomiting and Rash     Nalbuphine Hcl Rash     RASH :nubaine     Zosyn [Piperacillin-Tazobactam In D5w] Rash     Possible allergy, did have a diffuse rash that seemed drug related but could have also been related to soap in the hospital.      Bactrim [Sulfamethoxazole W-Trimethoprim] Other (See Comments) and Nausea and Vomiting     Severely low liver function.     Compazine [Prochlorperazine] Other (See Comments)     Twitching. Takes Benedryl and is fine     Tape [Adhesive Tape] Blisters     Tramadol      Zofran [Ondansetron] Other (See Comments)     migraine     Flagyl [Metronidazole] Hives and Rash     FHx:  Family History   Problem Relation Age of Onset     Eye Disorder Father         cataract, detached retina     Myocardial Infarction Father 60     Lipids Father      Cerebrovascular Disease Father      Depression Father      Substance Abuse Father      Anesthesia Reaction Father         stroke right after surgery     Cataracts Father      Osteoarthritis Father      Ulcerative Colitis Father      Lipids Mother      Hypertension Mother      Thyroid Disease Mother      Depression Mother      Angina Mother      GERD Mother      Skin Cancer Mother      Eye Disorder Son         ptosis     Eye Disorder Paternal Grandmother         cataract     Eye Disorder Paternal Grandfather         cataract     Diabetes Paternal Grandfather      Eye Disorder Maternal Grandmother         cataract     Thyroid Disease Maternal Grandmother       Coronary Artery Disease Sister         angioplasty     GERD Sister      Substance Abuse Sister      Depression Sister      Depression Son      Anxiety Disorder Son      Thyroid Disease Sister      Diabetes Maternal Grandfather      Depression Nephew      Anxiety Disorder Nephew      Thyroid Disease Nephew      Diabetes Type 2  Cousin         paternal cousin     Heart Disease Paternal Aunt      Diabetes Paternal Aunt      Diabetes Paternal Uncle      Heart Disease Paternal Uncle        SOCIAL Hx:  Social History     Socioeconomic History     Marital status:      Spouse name: Norris     Number of children: 3     Years of education: 16     Highest education level: Not on file   Occupational History     Occupation: director     Employer: [employer]   Tobacco Use     Smoking status: Former Smoker     Packs/day: 0.50     Years: 6.00     Pack years: 3.00     Types: Cigarettes     Start date: 1985     Quit date: 1992     Years since quittin.1     Smokeless tobacco: Never Used     Tobacco comment: no 2nd hand   Substance and Sexual Activity     Alcohol use: Not Currently     Alcohol/week: 3.0 - 6.0 standard drinks     Types: 1 - 2 Glasses of wine, 1 - 2 Cans of beer, 1 - 2 Shots of liquor per week     Comment: none since IGG     Drug use: No     Sexual activity: Yes     Partners: Male     Birth control/protection: None     Comment: Hystectomy    Other Topics Concern     Parent/sibling w/ CABG, MI or angioplasty before 65F 55M? No      Service Not Asked     Blood Transfusions No     Caffeine Concern Not Asked     Occupational Exposure Not Asked     Hobby Hazards Not Asked     Sleep Concern Not Asked     Stress Concern Not Asked     Weight Concern Not Asked     Special Diet Not Asked     Back Care Not Asked     Exercise Not Asked     Bike Helmet Not Asked     Seat Belt Yes     Self-Exams Not Asked   Social History Narrative     with 3 children and a dog.  No smoking, etoh or drug use.   Worked as a  for [employer] in [place] in the past.  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     Social Determinants of Health     Financial Resource Strain: Not on file   Food Insecurity: Not on file   Transportation Needs: Not on file   Physical Activity: Not on file   Stress: Not on file   Social Connections: Not on file   Intimate Partner Violence: Not on file   Housing Stability: Not on file       ROS: A comprehensive Review of Systems was asked and answered in the negative unless specifically commented upon in the HPI    Physical Exam  Gen: A&Ox3, NAD  HEENT: Moist mucus membranes, no scleral icterus.  Lungs: no respiratory distress    LABS:  Lab on 02/17/2022   Component Date Value Ref Range Status     Lauric Acid C12 0 Test 02/17/2022 29  6 - 90 nmol/mL Final     Myristic Acid C14 0 Test 02/17/2022 158  30 - 450 nmol/mL Final     Hexadecenoic Acid Test 02/17/2022 48  25 - 105 nmol/mL Final     Palmitoleic Acid Test 02/17/2022 347  110 - 1130 nmol/mL Final     Palmitic Acid Test 02/17/2022 2534  1480 - 3730 nmol/mL Final     G Linolenic Acid Test 02/17/2022 61  16 - 150 nmol/mL Final     A linolenic Acid Test 02/17/2022 115  50 - 130 nmol/mL Final     Linoleic Acid Test 02/17/2022 4369 (A) 2270 - 3850 nmol/mL Final     Oleic Acid Test 02/17/2022 2122  650 - 3500 nmol/mL Final     Vaccenic Acid Test 02/17/2022 169 (A) 280 - 740 nmol/mL Final     Stearic Acid Test 02/17/2022 1257 (A) 590 - 1170 nmol/mL Final     EPA C20 5W3 Test 02/17/2022 157 (A) 14 - 100 nmol/mL Final     Arachidonic Acid Test 02/17/2022 1281  520 - 1490 nmol/mL Final     Linn Acid Test 02/17/2022 38 (A) 7 - 30 nmol/mL Final     H G Linolenic Test 02/17/2022 226  50 - 250 nmol/mL Final     Arachidic Acid Test 02/17/2022 31 (A) 50 - 90 nmol/mL Final     DHA C22 6W3 Test 02/17/2022 159  30 - 250 nmol/mL Final     DPA C22 5W6 Test 02/17/2022 30  10 - 70 nmol/mL Final     DPA C22 5W3 Test  02/17/2022 99  20 - 210 nmol/mL Final     DTA C22 4W6 Test 02/17/2022 37  10 - 80 nmol/mL Final     Docosenoic Acid Test 02/17/2022 5  4 - 13 nmol/mL Final     Nervonic Acid Test 02/17/2022 111 (A) 60 - 100 nmol/mL Final     Triene Tetraene Ratio Test 02/17/2022 0.030  0.010 - 0.038 Final     Total Saturated Acid Test 02/17/2022 4.2  2.5 - 5.5 mmol/L Final     Total Monounsat Acid Test 02/17/2022 2.8  1.3 - 5.8 mmol/L Final     Total Polyunsat Acid Test 02/17/2022 6.6 (A) 3.2 - 5.8 mmol/L Final     Total W3 Test 02/17/2022 0.5  0.2 - 0.5 mmol/L Final     Total W6 Test 02/17/2022 6.0 (A) 3.0 - 5.4 mmol/L Final     Total Fatty Acids Test 02/17/2022 13.6  7.3 - 16.8 mmol/L Final     Interpretation Test 02/17/2022 SEE NOTE   Final    In this sample, the concentrations of linoleic and   alpha-linolenic acids were not reduced.  The   triene/tetraene ratio was not elevated. These findings are   not suggestive of a nutritional deficiency of essential   fatty acids.     -------------------ADDITIONAL INFORMATION-------------------  Gas Chromatography-Mass Spectrometry (GC-MS) Stable Isotope   Dilution Analysis  This test was developed and its performance characteristics   determined by [facility] in a manner consistent with CLIA   requirements. This test has not been cleared or approved by   the U.S. Food and Drug Administration.     Test Performed by:  [facility] [place] [address]  : Estrada Corbett M.D. Ph.D.; CLIA# [id number]     Glucose 02/17/2022 132 (A) 70 - 99 mg/dL Final     Patient Fasting > 8hrs? 02/17/2022 Yes   Final     C Peptide 02/17/2022 1.6  0.9 - 6.9 ng/mL Final     Hemoglobin A1C 02/17/2022 5.4  0.0 - 5.6 % Final    Normal <5.7%   Prediabetes 5.7-6.4%    Diabetes 6.5% or higher     Note: Adopted from ADA consensus guidelines.     Vitamin A 02/17/2022 0.61  0.30 - 1.20 mg/L Final     Retinol Palmitate 02/17/2022 <0.02  0.00 - 0.10  mg/L Final     Vitamin A Interp 02/17/2022 Normal   Final      This test was developed and its performance characteristics   determined by [facility]. It has not been cleared or   approved by the US Food and Drug Administration. This test   was performed in a CLIA certified laboratory and is   intended for clinical purposes.  Performed By: [facility]  [address] [place] [id number]  : Mira Garcia MD     Vitamin B12 02/17/2022 616  193 - 986 pg/mL Final     Vitamin E 02/17/2022 13.6  5.5 - 18.0 mg/L Final      This test was developed and its performance characteristics   determined by [facility]. It has not been cleared or   approved by the US Food and Drug Administration. This test   was performed in a CLIA certified laboratory and is   intended for clinical purposes.     Vitamin E Gamma 02/17/2022 0.6  0.0 - 6.0 mg/L Final    Performed By: [facility]  [address] [id number]  : Mira Garcia MD     25 OH Vitamin D2 02/17/2022 <5  ug/L Final     25 OH Vitamin D3 02/17/2022 49  ug/L Final     25 OH Vit D Total 02/17/2022 <54  20 - 75 ug/L Final    Season, race, dietary intake, and treatment affect the concentration of 25-hydroxy-Vitamin D. Values may decrease during winter months and increase during summer months. Values 20-29 ug/L may indicate Vitamin D insufficiency and values <20 ug/L may indicate Vitamin D deficiency.     Sodium 02/17/2022 138  133 - 144 mmol/L Final     Potassium 02/17/2022 4.0  3.4 - 5.3 mmol/L Final     Chloride 02/17/2022 102  94 - 109 mmol/L Final     Carbon Dioxide (CO2) 02/17/2022 29  20 - 32 mmol/L Final     Anion Gap 02/17/2022 7  3 - 14 mmol/L Final     Urea Nitrogen 02/17/2022 15  7 - 30 mg/dL Final     Creatinine 02/17/2022 0.71  0.52 - 1.04 mg/dL Final     Calcium 02/17/2022 9.6  8.5 - 10.1 mg/dL Final     Glucose 02/17/2022 132 (A) 70 - 99 mg/dL Final     Alkaline Phosphatase  02/17/2022 98  40 - 150 U/L Final     AST 02/17/2022 23  0 - 45 U/L Final     ALT 02/17/2022 31  0 - 50 U/L Final     Protein Total 02/17/2022 7.8  6.8 - 8.8 g/dL Final     Albumin 02/17/2022 4.2  3.4 - 5.0 g/dL Final     Bilirubin Total 02/17/2022 0.9  0.2 - 1.3 mg/dL Final     GFR Estimate 02/17/2022 >90  >60 mL/min/1.73m2 Final    Effective December 21, 2021 eGFRcr in adults is calculated using the 2021 CKD-EPI creatinine equation which includes age and gender (Richard et al., NEJ, DOI: 10.1056/JVYRuk7012582)     Prealbumin 02/17/2022 25  15 - 45 mg/dL Final     Iron 02/17/2022 91  35 - 180 ug/dL Final     Iron Binding Capacity 02/17/2022 266  240 - 430 ug/dL Final     Iron Sat Index 02/17/2022 34  15 - 46 % Final     Ferritin 02/17/2022 236  8 - 252 ng/mL Final     Folate RBC 02/17/2022 702  >=366 ng/mL Final    Performed By: [facility]  [address] [place] [id number]  : Mira Garcia MD     Zinc, Serum/Plasma 02/17/2022 70.1  60.0 - 120.0 ug/dL Final    Comment: INTERPRETIVE INFORMATION: Zinc, Serum or Plasma    Elevated results may be due to skin or collection-related   contamination, including the use of a noncertified   metal-free collection/transport tube. If contamination   concerns exist due to elevated levels of serum/plasma zinc,   confirmation with a second specimen collected in a   certified metal-free tube is recommended.    Circulating zinc concentrations are dependent on albumin   status and are depressed with malnutrition.  Zinc may also   be lowered with infection, inflammation, stress, oral   contraceptives, and pregnancy.  Zinc may be elevated with   zinc supplementation or fasting.  Elevated zinc   concentrations may interfere with copper absorption.     This test was developed and its performance characteristics   determined by [facility]. It has not been cleared or   approved by the US Food and Drug Administration. This test   was performed in a  CLIA certified laboratory and is   intended for clinical                            purposes.  Performed By: [facility]  [address] [place] [id number]  : Mira Garcia MD     WBC Count 02/17/2022 5.0  4.0 - 11.0 10e3/uL Final     RBC Count 02/17/2022 4.17  3.80 - 5.20 10e6/uL Final     Hemoglobin 02/17/2022 13.1  11.7 - 15.7 g/dL Final     Hematocrit 02/17/2022 40.0  35.0 - 47.0 % Final     MCV 02/17/2022 96  78 - 100 fL Final     MCH 02/17/2022 31.4  26.5 - 33.0 pg Final     MCHC 02/17/2022 32.8  31.5 - 36.5 g/dL Final     RDW 02/17/2022 13.4  10.0 - 15.0 % Final     Platelet Count 02/17/2022 389  150 - 450 10e3/uL Final     % Neutrophils 02/17/2022 54  % Final     % Lymphocytes 02/17/2022 34  % Final     % Monocytes 02/17/2022 9  % Final     % Eosinophils 02/17/2022 1  % Final     % Basophils 02/17/2022 2  % Final     % Immature Granulocytes 02/17/2022 0  % Final     NRBCs per 100 WBC 02/17/2022 0  <1 /100 Final     Absolute Neutrophils 02/17/2022 2.7  1.6 - 8.3 10e3/uL Final     Absolute Lymphocytes 02/17/2022 1.7  0.8 - 5.3 10e3/uL Final     Absolute Monocytes 02/17/2022 0.5  0.0 - 1.3 10e3/uL Final     Absolute Eosinophils 02/17/2022 0.1  0.0 - 0.7 10e3/uL Final     Absolute Basophils 02/17/2022 0.1  0.0 - 0.2 10e3/uL Final     Absolute Immature Granulocytes 02/17/2022 0.0  <=0.4 10e3/uL Final     Absolute NRBCs 02/17/2022 0.0  10e3/uL Final     Glucose 02/17/2022 174 (A) 70 - 99 mg/dL Final     Patient Fasting > 8hrs? 02/17/2022 Unknown   Final     C Peptide 02/17/2022 2.3  0.9 - 6.9 ng/mL Final     Glucose 02/17/2022 137 (A) 70 - 99 mg/dL Final     Patient Fasting > 8hrs? 02/17/2022 Unknown   Final     C Peptide 02/17/2022 2.5  0.9 - 6.9 ng/mL Final       Endoscopies:  SBE 02/03/2022  1:50 PM Fransisco Salas   [address] [address] [place]  [address]   _______________________________________________________________________________   Patient Name:  Dinora Mcghee           Procedure Date: 2/3/2022 1:50 PM   MRN: [id number]                       Account Number: [id number]   YOB: 1965              Admit Type: Outpatient   Age: 56                               Room: special procedure room #1   Note Status: Finalized                Attending MD: Francisco Dodson MD   Instrument Name: 410 [id number] EGD     _______________________________________________________________________________       Procedure:                Small bowel enteroscopy   Indications:              For therapy of gastroparesis, 55yo woman post TPIAT                             with course complicated by gastroparesis s/p                             direct-G tube and direct-J tube. Direct J-tube                             created significant problems and was subsequently                             removed on 1/28/22. She then had leaking from the                             prior PEJ site. However, this has since 'dried' up                             without further drainage for the past couple of                             days. Plan for Botox injection into the pylorus as                             well as assess PEJ site.   Providers:                Francisco Dodson MD, Yoselyn Wolff RN, Michelle Rodriguez RN   Referring MD:             Latasha Paul NP   Medicines:                General Anesthesia   Complications:            No immediate complications. Estimated blood loss:                             Minimal.   _______________________________________________________________________________   Procedure:                After obtaining informed consent, the endoscope was                             passed under direct vision. Throughout the                             procedure, the patient's blood pressure, pulse, and                             oxygen saturations were monitored continuously. The                             Colonoscope was  introduced through the mouth and                             advanced to the afferent and efferent jejunal loop.                             After obtaining informed consent, the endoscope was                             passed under direct vision. Throughout the                             procedure, the patient's blood pressure, pulse, and                             oxygen saturations were monitored continuously.The                             small bowel enteroscopy was accomplished without                             difficulty. The patient tolerated the procedure                             well.                                                                                     Findings:        The esophagus was normal.        There was evidence of an intact gastrostomy with a patent G-tube present        in the gastric antrum. This was characterized by healthy appearing        mucosa.        The pylorus was normal. Area was successfully injected with 200 units        botulinum toxin in four quadrants in 1 mL aliquots.        Pediatric colonoscope advanced into the small bowel. There was evidence        of a widely patent duodenoenterostomy in the duodenal bulb. This was        characterized by healthy appearing mucosa.        There was evidence of a widely patent jejunojejunostomy in the proximal        jejunum. This was characterized by healthy appearing mucosa. The site of        where her likely prior PEJ was visualized endoscopically. No overt        communication to the skin seen. Leak test performed by insufflating CO2        into the bowel lumen and submerging the PEJ site on the skin underwater.        No bubbles appreciated                                                                                     Impression:               - Normal esophagus.                             - Intact gastrostomy with a patent G-tube present                             characterized by healthy appearing mucosa.                              - Normal pylorus. Injected with 200 units of                             botulinum toxin.                             - Widely patent duodenoenterostomy, characterized                             by healthy appearing mucosa was found.                             - Widely patent enteroenterostomy, characterized by                             healthy appearing mucosa was found in the jejunum.                             - Probably prior PEJ site visualized                             endoscopically. No persistent fistula seen.                             Negative leak test. Consitent with clinical                             resolution of leakage.                             - No specimens collected.   Recommendation:           - Discharge patient to home (ambulatory).                             - Monitor for clinical improvement in gastroparesis                             symptoms following botox injection. Will follow up                             in clinic in ~1 month to reassess. If response to                             botox, then can consider G-POEM for definative                             management                             - G-tube may be used for venting as before                             - Continue to leave dressing on PEJ site. If not                             further leakage for the next two weeks, can likely                             remove.                             - Resume other diet and medications                             - Above discussed with patient and family                                                                                       Francisco Dodson MD   ________________

## 2022-03-08 DIAGNOSIS — E13.9 POST-PANCREATECTOMY DIABETES (H): ICD-10-CM

## 2022-03-08 DIAGNOSIS — Z90.410 POST-PANCREATECTOMY DIABETES (H): ICD-10-CM

## 2022-03-08 DIAGNOSIS — E89.1 POST-PANCREATECTOMY DIABETES (H): ICD-10-CM

## 2022-03-14 NOTE — PROGRESS NOTES
IV fluid Infusion therapy orders faxed to    [phone], Attn: infusion.    Mansoor Dhillon LPN

## 2022-03-17 ENCOUNTER — CARE COORDINATION (OUTPATIENT)
Dept: GASTROENTEROLOGY | Facility: CLINIC | Age: 57
End: 2022-03-17
Payer: COMMERCIAL

## 2022-03-30 ENCOUNTER — VIRTUAL VISIT (OUTPATIENT)
Dept: GASTROENTEROLOGY | Facility: CLINIC | Age: 57
End: 2022-03-30
Payer: COMMERCIAL

## 2022-03-30 DIAGNOSIS — K31.84 GASTROPARESIS: Primary | ICD-10-CM

## 2022-03-30 PROCEDURE — 99215 OFFICE O/P EST HI 40 MIN: CPT | Mod: 95 | Performed by: INTERNAL MEDICINE

## 2022-03-30 NOTE — PROGRESS NOTES
Dinora is a 57 year old who is being evaluated via a billable video visit.      How would you like to obtain your AVS? MyChart  If the video visit is dropped, the invitation should be resent by: Send to e-mail at: [email or web address]  Will anyone else be joining your video visit? No    Start Time 3:28PM  End Time 4:30 PM  Video-Visit Details    Type of service:  Video Visit       Originating Location (pt. Location): Home    Distant Location (provider location):  [facility]     Platform used for Video Visit: Kudoala     Prolonged discussion re status of QOL , gastroparesis. Has chronic fatigue, but main problem is intractable nausea and vomiting despite venting G tubes. Has several bad days then several good days. 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 Limited by health issues. Recent vacation in [place]. Had to nap even in AM.  Doing infusions once a week.   We spent most of our 60 minute video visit, 40 of which were centering directly on her medical issues. We discussed the followin) domperidone IND is imminent. Will meet w her as soon as it is available. Main concern is avoiding drug interactions w many of her current meds, which may need to be stopped. And safety precautions such as QT interval on EKG. Hopeful but not certain that domperidone will provide a measure of relief from gastroparesis.  2) Botox injection did not help, in fact exacerbated sx. GPOEM not an option  3) We spent time reviewing our anecdotal experience w FMT/IMT in an individual post TPIAT, that had dramatic response. My suspicion that altered microbiome may play a significant role in late GI sx post TPIAT.    Plan:   1) Follow-up clinic in 6 weeks, hoping that domperidone IND will be complete by then.

## 2022-03-30 NOTE — LETTER
3/30/2022         RE: Dinora Mcghee  [address] [facility] [address]        Dear Colleague,    Thank you for referring your patient, Dinora Mcghee, to the [facility] [place]. Please see a copy of my visit note below.      Prolonged discussion re status of QOL , gastroparesis. Has chronic fatigue, but main problem is intractable nausea and vomiting despite venting G tubes. Has several bad days then several good days. 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 Limited by health issues. Recent vacation in [place]. Had to nap even in AM.  Doing infusions once a week.   We spent most of our 60 minute video visit, 40 of which were centering directly on her medical issues. We discussed the followin) domperidone IND is imminent. Will meet w her as soon as it is available. Main concern is avoiding drug interactions w many of her current meds, which may need to be stopped. And safety precautions such as QT interval on EKG. Hopeful but not certain that domperidone will provide a measure of relief from gastroparesis.  2) Botox injection did not help, in fact exacerbated sx. GPOEM not an option  3) We spent time reviewing our anecdotal experience w FMT/IMT in an individual post TPIAT, that had dramatic response. My suspicion that altered microbiome may play a significant role in late GI sx post TPIAT.    Plan:   1) Follow-up clinic in 6 weeks, hoping that domperidone IND will be complete by then.        Mandeep Park MD

## 2022-03-31 NOTE — PATIENT INSTRUCTIONS
Follow up:    Dr. Park has outlined the following steps after your recent clinic visit:    -Follow-up with Dr. Park in 6 weeks, hoping that domperidone IND will be complete by then. I have you scheduled for 5/9/22 at 1:15pm. Please let me know if the date/time will not work.    Please call with any questions or concerns regarding your clinic visit today.    It is a pleasure being involved in your health care.    Contacts post-consultation depending on your need:    Schedule Clinic Appointments            [phone] # 1   M-F 7:30 - 5 pm    Kenya Victor RN Care Coordinator  [phone]    Mansoor Dhillon LPN    [phone]     OR Procedure Scheduling                             [phone]    My Chart is available 24 hours a day and is a secure way to access your records and communicate with your care team.  I strongly recommend signing up if you haven't already done so, if you are comfortable with computers.  If you would like to inquire about this or are having problems with My Chart access, you may call [phone] or go online at [email or web address].  Please allow at least 24 hours for a response and extra time on weekends and Holidays.

## 2022-04-04 DIAGNOSIS — R51.9 HEADACHE DISORDER: ICD-10-CM

## 2022-04-04 NOTE — PROGRESS NOTES
This is a recent snapshot of the patient's [facility] medical record.  For current drug dose and complete information and questions, call [phone] or In Basket pool, fv home infusion ([id number])  CSN Number:  [id number]

## 2022-04-05 DIAGNOSIS — Z53.9 ERRONEOUS ENCOUNTER--DISREGARD: Primary | ICD-10-CM

## 2022-04-05 RX ORDER — AMITRIPTYLINE HYDROCHLORIDE 50 MG/1
50 TABLET ORAL AT BEDTIME
Qty: 90 TABLET | Refills: 3 | OUTPATIENT
Start: 2022-04-05

## 2022-04-05 RX ORDER — DULOXETIN HYDROCHLORIDE 30 MG/1
30 CAPSULE, DELAYED RELEASE ORAL 2 TIMES DAILY
COMMUNITY
Start: 2022-01-21 | End: 2022-06-02

## 2022-04-11 DIAGNOSIS — K21.9 GASTROESOPHAGEAL REFLUX DISEASE WITHOUT ESOPHAGITIS: ICD-10-CM

## 2022-04-11 NOTE — PROGRESS NOTES
This is a recent snapshot of the patient's [facility] medical record.  For current drug dose and complete information and questions, call [phone] or In Basket pool, fv home infusion ([id number])  CSN Number:  [id number]

## 2022-04-12 NOTE — PROGRESS NOTES
This is a recent snapshot of the patient's [facility] medical record.  For current drug dose and complete information and questions, call [phone] or In Basket pool, fv home infusion ([id number])  CSN Number:  [id number]

## 2022-04-13 RX ORDER — OMEPRAZOLE 40 MG/1
40 CAPSULE, DELAYED RELEASE ORAL 2 TIMES DAILY
Qty: 180 CAPSULE | Refills: 3 | Status: SHIPPED | OUTPATIENT
Start: 2022-04-13 | End: 2022-05-24

## 2022-04-13 NOTE — TELEPHONE ENCOUNTER
omeprazole (PRILOSEC) 40 MG     Last Written Prescription Date:  11/23/20  Last Fill Quantity: 180,   # refills: 3  Last Office Visit : 3/30/22  Future Office visit: 5/9/22  Routing refill request to provider for review/approval because:  Does GI want to continue med?  RF GAP / active on med list

## 2022-04-16 ENCOUNTER — MYC REFILL (OUTPATIENT)
Dept: GASTROENTEROLOGY | Facility: CLINIC | Age: 57
End: 2022-04-16
Payer: COMMERCIAL

## 2022-04-16 DIAGNOSIS — R11.0 NAUSEA: ICD-10-CM

## 2022-04-18 RX ORDER — PROMETHAZINE HYDROCHLORIDE 25 MG/1
25 TABLET ORAL DAILY PRN
Qty: 60 TABLET | Refills: 0 | Status: ON HOLD | OUTPATIENT
Start: 2022-04-18 | End: 2023-01-09

## 2022-04-20 ENCOUNTER — MYC MEDICAL ADVICE (OUTPATIENT)
Dept: TRANSPLANT | Facility: CLINIC | Age: 57
End: 2022-04-20
Payer: COMMERCIAL

## 2022-04-21 ENCOUNTER — PATIENT OUTREACH (OUTPATIENT)
Dept: GASTROENTEROLOGY | Facility: CLINIC | Age: 57
End: 2022-04-21
Payer: COMMERCIAL

## 2022-04-21 DIAGNOSIS — U07.1 LAB TEST POSITIVE FOR DETECTION OF COVID-19 VIRUS: Primary | ICD-10-CM

## 2022-04-21 NOTE — TELEPHONE ENCOUNTER
Pt called in, says she tested positive for covid, wondering about next steps. Is symptomatic with cough, headache, malaise. 3 unvaccinated family members (aunt/uncles)  of covid, pt concerned. Pt is vaccinated and boosted. Pt reassured, encouraged hydration, rest and OCT meds to help with sympotms. She is scheduled for an iv infusion in covid + chair. Messages sent to inquire how patient can get monoclonial antibody therapy started.    ML

## 2022-04-25 DIAGNOSIS — K21.9 GASTROESOPHAGEAL REFLUX DISEASE WITHOUT ESOPHAGITIS: Primary | ICD-10-CM

## 2022-04-25 RX ORDER — FAMOTIDINE 20 MG/1
20 TABLET, FILM COATED ORAL DAILY
Qty: 90 TABLET | Refills: 3 | Status: ON HOLD | OUTPATIENT
Start: 2022-04-25 | End: 2023-01-08

## 2022-04-25 RX ORDER — OMEPRAZOLE 40 MG/1
40 CAPSULE, DELAYED RELEASE ORAL DAILY
Qty: 90 CAPSULE | Refills: 3 | Status: ON HOLD | OUTPATIENT
Start: 2022-04-25 | End: 2023-01-08

## 2022-04-26 DIAGNOSIS — U07.1 LAB TEST POSITIVE FOR DETECTION OF COVID-19 VIRUS: Primary | ICD-10-CM

## 2022-05-02 ASSESSMENT — ENCOUNTER SYMPTOMS
NUMBNESS: 0
TASTE DISTURBANCE: 0
MUSCLE CRAMPS: 1
SORE THROAT: 1
ARTHRALGIAS: 1
WEIGHT GAIN: 0
INCREASED ENERGY: 1
VOMITING: 1
NAUSEA: 1
POSTURAL DYSPNEA: 0
NECK MASS: 0
WEIGHT LOSS: 0
WEAKNESS: 0
TREMORS: 0
BLOOD IN STOOL: 0
EYE REDNESS: 0
TROUBLE SWALLOWING: 1
NIGHT SWEATS: 1
LOSS OF CONSCIOUSNESS: 0
HALLUCINATIONS: 0
BACK PAIN: 0
DIZZINESS: 0
COUGH DISTURBING SLEEP: 1
SINUS PAIN: 1
DIFFICULTY URINATING: 0
MUSCLE WEAKNESS: 0
CONSTIPATION: 1
POLYPHAGIA: 0
HEARTBURN: 0
SPEECH CHANGE: 0
FEVER: 0
SMELL DISTURBANCE: 0
NECK PAIN: 1
BLOATING: 1
ALTERED TEMPERATURE REGULATION: 1
EYE IRRITATION: 1
STIFFNESS: 0
DISTURBANCES IN COORDINATION: 0
POLYDIPSIA: 0
COUGH: 1
DECREASED APPETITE: 0
EYE WATERING: 0
ABDOMINAL PAIN: 1
FLANK PAIN: 1
CHILLS: 1
DIARRHEA: 1
SPUTUM PRODUCTION: 1
MEMORY LOSS: 0
EYE PAIN: 0
HEMOPTYSIS: 0
HEMATURIA: 0
SEIZURES: 0
FATIGUE: 1
DYSURIA: 0
HEADACHES: 1
DOUBLE VISION: 0
BOWEL INCONTINENCE: 0
RECTAL PAIN: 0
HOARSE VOICE: 1
DYSPNEA ON EXERTION: 0
JAUNDICE: 0
MYALGIAS: 1
PARALYSIS: 0
TINGLING: 0
WHEEZING: 0
SINUS CONGESTION: 1
SNORES LOUDLY: 0
JOINT SWELLING: 0
SHORTNESS OF BREATH: 0

## 2022-05-03 ENCOUNTER — OFFICE VISIT (OUTPATIENT)
Dept: INTERNAL MEDICINE | Facility: CLINIC | Age: 57
End: 2022-05-03
Payer: COMMERCIAL

## 2022-05-03 ENCOUNTER — TELEPHONE (OUTPATIENT)
Dept: GASTROENTEROLOGY | Facility: CLINIC | Age: 57
End: 2022-05-03
Payer: COMMERCIAL

## 2022-05-03 VITALS
SYSTOLIC BLOOD PRESSURE: 114 MMHG | RESPIRATION RATE: 12 BRPM | HEART RATE: 103 BPM | DIASTOLIC BLOOD PRESSURE: 80 MMHG | OXYGEN SATURATION: 98 % | BODY MASS INDEX: 22.03 KG/M2 | WEIGHT: 140.4 LBS | HEIGHT: 67 IN | TEMPERATURE: 97.1 F

## 2022-05-03 DIAGNOSIS — G43.109 MIGRAINE WITH AURA AND WITHOUT STATUS MIGRAINOSUS, NOT INTRACTABLE: Primary | ICD-10-CM

## 2022-05-03 DIAGNOSIS — K31.84 GASTROPARESIS: ICD-10-CM

## 2022-05-03 PROCEDURE — 99215 OFFICE O/P EST HI 40 MIN: CPT | Performed by: NURSE PRACTITIONER

## 2022-05-03 ASSESSMENT — PAIN SCALES - GENERAL: PAINLEVEL: NO PAIN (0)

## 2022-05-03 NOTE — NURSING NOTE
Dinora Mcghee is a 57 year old female patient that presents today in clinic for the following:    Chief Complaint   Patient presents with     Headache     Patient comes in to discuss migraines and headaches. Would like to discuss medications.     The patient's allergies and medications were reviewed as noted. A set of vitals were recorded as noted without incident. The patient does not have any other questions for the provider.    Ebenezer Hayden, EMT at 2:53 PM on 5/3/2022

## 2022-05-03 NOTE — PROGRESS NOTES
S: Dinora Mcghee is a 57 year old female with gastroparesis, and migraines here to discuss medication management.  Her gastroenterologist is in the process of trying to obtain Domperidone to treat her gastroparesis.  She currently does not eat much at all because it causes nausea and vomiting, despite venting her gastric tube. She is asking if Domperidone will react with her other medications.She has weaned off amitriptyline.  She currently has migraines twice a week and approx. twice a year has 2 day migraines which makes her non-functional. She treats these with Zomig usually.      She had Covid 4/19/22 and still feel very fatigued.     Her constant nausea and inability to eat has altered her lifestyle immensely.  She missed 2 family out of state gatherings. She cannot golf this summer.  Her  bought her a motorized bicycle with raised handlebars.She lies to cook but cannot eat.     She is trying to redefine her life and what she should focus on.  She is working only about 20 hours/week.        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     Incisional hernia, without obstruction or gangrene     Adhesive capsulitis of shoulder, unspecified laterality     S/P hernia repair     Headache, chronic migraine without aura     Chronic pain syndrome     Iron deficiency anemia     Hypoglycemia     Adult failure to thrive     Abdominal pain, generalized      Gastrostomy tube obstruction (H)     Intra-abdominal abscess (H)     Postprocedural intraabdominal abscess            Past Medical History:   Diagnosis Date     Allergic rhinitis, cause unspecified      Allergy to other foods     strawberries, apples, celeries, alice, watermelon     Arthritis     left knee     Choledocholithiasis     long after cholecystectomy     Chronic abdominal pain      Chronic constipation      Chronic nausea      Chronic pancreatitis (H)      Degeneration of lumbar or lumbosacral intervertebral disc      Esophageal reflux     w/ hiatal hernia     Gastroparesis      Hiatal hernia      History of pituitary adenoma     s/p resection     Hypothyroidism      Migraines      Mild hyperlipidemia      On tube feeding diet     presence of GJ tube     Pancreatic disease     PD stricture, 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      PONV (postoperative nausea and vomiting)      Portacath in place      Unspecified hearing loss     25% high frequency R     Immunization History   Administered Date(s) Administered     COVID-19,PF,Moderna 03/11/2021, 04/08/2021, 09/16/2021     Flu, Unspecified 09/29/2011, 11/04/2013, 11/15/2014, 10/23/2015, 10/21/2016, 10/20/2017, 02/01/2019     Hib (PRP-T) 12/01/2016     Influenza (IIV3) PF 11/01/2003, 09/29/2011, 11/15/2014, 10/21/2016     Influenza (intradermal) 11/01/2003, 09/29/2011     Influenza Quad, Recombinant, pf(RIV4) (Flublok) 09/16/2021     Influenza Vaccine IM > 6 months Valent IIV4 (Alfuria,Fluzone) 10/24/2015, 10/21/2016, 10/20/2017, 02/01/2019, 09/17/2019, 11/30/2020     Influenza Vaccine, 6+MO IM (QUADRIVALENT W/PRESERVATIVES) 11/04/2013     Meningococcal (Bexsero ) 12/09/2016     Meningococcal (Menactra ) 12/01/2016     Pneumo Conj 13-V (2010&after) 09/06/2017     Pneumococcal 23 valent 12/01/2016     TD (ADULT, 7+) 07/09/1998     TDAP Vaccine (Adacel) 01/07/2008     TDAP Vaccine (Boostrix) 07/31/2018     Td (Adult), Adsorbed 01/08/1998, 07/09/1998      Tdap (Adult) Unspecified Formulation 1998     Zoster vaccine recombinant adjuvanted (SHINGRIX) 2019, 2020                Past Surgical History:   Procedure Laterality Date     ABDOMEN SURGERY      c sections: 93, 96, 98. endometriosis growth     APPENDECTOMY        SECTION       COLONOSCOPY       ENDOSCOPIC INSERTION TUBE GASTROSTOMY N/A 2021    Procedure: Gastrojejonostomy placement with jejunopexy, PEG tube exchange;  Surgeon: Zackery Montoya MD;  [facility]     ENDOSCOPIC RETROGRADE CHOLANGIOPANCREATOGRAM N/A 2015    Procedure: ENDOSCOPIC RETROGRADE CHOLANGIOPANCREATOGRAM;  Surgeon: Mandeep Park MD;  [facility]     ENDOSCOPIC RETROGRADE CHOLANGIOPANCREATOGRAM N/A 2016    Procedure: COMBINED ENDOSCOPIC RETROGRADE CHOLANGIOPANCREATOGRAPHY, PLACE TUBE/STENT;  Surgeon: Mandeep Park MD;  [facility]     ENDOSCOPIC RETROGRADE CHOLANGIOPANCREATOGRAM N/A 3/17/2016    Procedure: COMBINED ENDOSCOPIC RETROGRADE CHOLANGIOPANCREATOGRAPHY, REMOVE FOREIGN BODY OR STENT/TUBE;  Surgeon: Mandeep Park MD;  [facility]     ENDOSCOPIC RETROGRADE CHOLANGIOPANCREATOGRAM N/A 2016    Procedure: COMBINED ENDOSCOPIC RETROGRADE CHOLANGIOPANCREATOGRAPHY, PLACE TUBE/STENT;  Surgeon: Mandeep Park MD;  [facility]     ENDOSCOPIC RETROGRADE CHOLANGIOPANCREATOGRAM N/A 2016    Procedure: COMBINED ENDOSCOPIC RETROGRADE CHOLANGIOPANCREATOGRAPHY, REMOVE FOREIGN BODY OR STENT/TUBE;  Surgeon: Mandeep Park MD;  [facility]     ENDOSCOPIC ULTRASOUND UPPER GASTROINTESTINAL TRACT (GI) N/A 10/3/2016    Procedure: ENDOSCOPIC ULTRASOUND, ESOPHAGOSCOPY / UPPER GASTROINTESTINAL TRACT (GI);  Surgeon: Guru Jose Rodas MD;  [facility]     ENTEROSCOPY SMALL BOWEL N/A 2/3/2022    Procedure: ENTEROSCOPY with possible fistula closure;  Surgeon: Francisco Dodson MD;  Location:  GI      ESOPHAGOSCOPY, GASTROSCOPY, DUODENOSCOPY (EGD), COMBINED N/A 6/24/2015    Procedure: COMBINED ESOPHAGOSCOPY, GASTROSCOPY, DUODENOSCOPY (EGD), REMOVE FOREIGN BODY;  Surgeon: Mandeep Park MD;  [facility]     ESOPHAGOSCOPY, GASTROSCOPY, DUODENOSCOPY (EGD), COMBINED N/A 10/25/2015    Procedure: COMBINED ESOPHAGOSCOPY, GASTROSCOPY, DUODENOSCOPY (EGD);  Surgeon: Sammy Amaro MD;  [facility]     ESOPHAGOSCOPY, GASTROSCOPY, DUODENOSCOPY (EGD), COMBINED N/A 10/25/2015    Procedure: COMBINED ESOPHAGOSCOPY, GASTROSCOPY, DUODENOSCOPY (EGD), BIOPSY SINGLE OR MULTIPLE;  Surgeon: Sammy Amaro MD;  [facility]     ESOPHAGOSCOPY, GASTROSCOPY, DUODENOSCOPY (EGD), DILATATION, COMBINED       EXCISE LESION TRUNK N/A 4/17/2017    Procedure: EXCISE LESION TRUNK;  Removal of Abdominal Foreign Body;  Surgeon: Nestor Phoenix MD;  [facility]     HC ESOPH/GAS REFLUX TEST W NASAL IMPED >1 HR N/A 11/19/2015    Procedure: ESOPHAGEAL IMPEDENCE FUNCTION TEST WITH 24 HOUR PH GREATER THAN 1 HOUR;  Surgeon: Thiago Apple MD;  Location: [facility]     HC UGI ENDOSCOPY DIAG W BIOPSY  9/17/08     HC UGI ENDOSCOPY DIAG W BIOPSY  9/27/12     HC UGI ENDOSCOPY W ESOPHAGEAL DILATION BALLOON <30MM  9/17/08     HC UGI ENDOSCOPY W EUS N/A 5/5/2015    Procedure: COMBINED ENDOSCOPIC ULTRASOUND, ESOPHAGOSCOPY, GASTROSCOPY, DUODENOSCOPY (EGD);  Surgeon: Wm Dueñas MD;  [facility]     HC WRIST ARTHROSCOP,RELEASE XVERS LIG Bilateral 12/17/08     INJECT TRANSVERSUS ABDOMINIS PLANE (TAP) BLOCK BILATERAL Left 9/22/2016    Procedure: INJECT TRANSVERSUS ABDOMINIS PLANE (TAP) BLOCK BILATERAL;  Surgeon: Dickson Corrigan MD;  [facility]     laparoscopic pineda  1995     LAPAROSCOPIC HERNIORRHAPHY INCISIONAL N/A 8/23/2018    Procedure: LAPAROSCOPIC HERNIORRHAPHY INCISIONAL;  Laparoscopic Incisional Hernia Repair with Symbotex Mesh Implant;  Surgeon: Nestor Phoenix MD;  [facility]     LAPAROSCOPIC  PANCREATECTOMY, TRANSPLANT AUTO ISLET CELL N/A 2016    Procedure: LAPAROSCOPIC PANCREATECTOMY, TRANSPLANT AUTO ISLET CELL;  Surgeon: Nestor Phoenix MD;  [facility]     REMOVE GASTROSTOMY TUBE ADULT N/A 2022    Procedure: REMOVAL, GASTROSTOMY TUBE, ADULT;  Surgeon: Mandeep Park MD;  [facility]     REPLACE GASTROJEJUNOSTOMY TUBE, PERCUTANEOUS N/A 2021    Procedure: GASTROJEJUNOSTOMY TUBE PLACEMENT, PERCUTANEOUS, WITH GASTROPEXY;  Surgeon: Mandeep Park MD;  [facility]     REPLACE GASTROJEJUNOSTOMY TUBE, PERCUTANEOUS N/A 2021    Procedure: REPLACEMENT, GASTROJEJUNOSTOMY TUBE, PERCUTANEOUS;  Surgeon: Zackery Montoya MD;  [facility]     REPLACE JEJUNOSTOMY TUBE, PERCUTANEOUS N/A 9/10/2021    Procedure: UPPER ENDOSCOPY, REPLACEMENT OF PERCUTANEOUS GASTROJEJUNOSTOMY TUBE, T-TAG GASTROPEXY;  Surgeon: Zackery Montoya MD;  [facility]     REPLACE JEJUNOSTOMY TUBE, PERCUTANEOUS N/A 2021    Procedure: REPLACEMENT, JEJUNOSTOMY TUBE, PERCUTANEOUS;  Surgeon: Mandeep Park MD;  [facility]     transphenoidal pituitary resection       Z  DELIVERY ONLY       Z  DELIVERY ONLY      repeat c section with incidental cystotomy with repair     Z EXCIS PITUITARY,TRANSNASAL/SEPTAL      pituitary tumor removed for diabetes insipidus     Z TOTAL ABDOM HYSTERECTOMY      w/ bilateral salpingoophorectomy             Social History     Tobacco Use     Smoking status: Former Smoker     Packs/day: 0.50     Years: 6.00     Pack years: 3.00     Types: Cigarettes     Start date: 1985     Quit date: 1992     Years since quittin.3     Smokeless tobacco: Never Used     Tobacco comment: no 2nd hand   Substance Use Topics     Alcohol use: Not Currently     Alcohol/week: 3.0 - 6.0 standard drinks     Types: 1 - 2 Glasses of wine, 1 - 2 Cans of beer, 1 - 2 Shots of liquor per week     Comment: none since IGG             Family History   Problem Relation Age of Onset     Eye Disorder Father         cataract, detached retina     Myocardial Infarction Father 60     Lipids Father      Cerebrovascular Disease Father      Depression Father      Substance Abuse Father      Anesthesia Reaction Father         stroke right after surgery     Cataracts Father      Osteoarthritis Father      Ulcerative Colitis Father      Lipids Mother      Hypertension Mother      Thyroid Disease Mother      Depression Mother      Angina Mother      GERD Mother      Skin Cancer Mother      Eye Disorder Son         ptosis     Eye Disorder Paternal Grandmother         cataract     Eye Disorder Paternal Grandfather         cataract     Diabetes Paternal Grandfather      Eye Disorder Maternal Grandmother         cataract     Thyroid Disease Maternal Grandmother      Coronary Artery Disease Sister         angioplasty     GERD Sister      Substance Abuse Sister      Depression Sister      Depression Son      Anxiety Disorder Son      Thyroid Disease Sister      Diabetes Maternal Grandfather      Depression Nephew      Anxiety Disorder Nephew      Thyroid Disease Nephew      Diabetes Type 2  Cousin         paternal cousin     Heart Disease Paternal Aunt      Diabetes Paternal Aunt      Diabetes Paternal Uncle      Heart Disease Paternal Uncle                Allergies   Allergen Reactions     Apple Anaphylaxis     Corticosteroids Other (See Comments)     All oral, IV and injectable steroids are contraindicated (unless in life threatening situations) in [facility] transplant recipients. They can cause irreversible loss of islet cell function. Please contact patient's transplant care coordinator ADI Gaffney RN at [phone]/pager [phone] and/or endocrinologist prior to administration.       Depakote [Divalproex Sodium] Other (See Comments)     Chest pain     Zithromax [Azithromycin Dihydrate] Anaphylaxis     Noted in 4/7/08 ER     Bromfenac       Other reaction(s): Headache     Codeine      Other reaction(s): Hallucinations     Darvocet [Propoxyphene N-Apap] Itching     Hydromorphone Other (See Comments)     Loopy     Morphine Nausea and Vomiting and Rash     Nalbuphine Hcl Rash     RASH :nubaine     Zosyn [Piperacillin-Tazobactam In D5w] Rash     Possible allergy, did have a diffuse rash that seemed drug related but could have also been related to soap in the hospital.      Bactrim [Sulfamethoxazole W-Trimethoprim] Other (See Comments) and Nausea and Vomiting     Severely low liver function.     Compazine [Prochlorperazine] Other (See Comments)     Twitching. Takes Benedryl and is fine     Tape [Adhesive Tape] Blisters     Tramadol      Zofran [Ondansetron] Other (See Comments)     migraine     Flagyl [Metronidazole] Hives and Rash            Current Outpatient Medications   Medication Sig Dispense Refill     acarbose (PRECOSE) 50 MG tablet Take 1 tablet (50 mg) by mouth 3 times daily (with meals) Increase to 100 mg three times daily as needed. 180 tablet 5     amitriptyline (ELAVIL) 50 MG tablet Take 1 tablet (50 mg) by mouth At Bedtime 90 tablet 3     amylase-lipase-protease (CREON 24) 95617-33803 units CPEP per EC capsule Take 1-2 capsules by mouth Take with snacks or supplements       amylase-lipase-protease (CREON) 41960-79759 units CPEP per EC capsule Take 3-4 capsules by mouth 3 times daily (with meals) With oral meals 150 capsule 11     azelastine (ASTELIN) 0.1 % nasal spray Spray 1 spray into both nostrils 2 times daily 1 Bottle 11     blood glucose (PAT CONTOUR NEXT) test strip Use to test blood sugar 6 times daily or as directed. 2 Box 11     blood glucose monitoring (PAT MICROLET) lancets Use to test blood sugar 6-8 times daily or as directed. 100 each 11     cetirizine (ZYRTEC) 10 MG tablet Take 1 tablet (10 mg) by mouth daily (Patient taking differently: Take 10 mg by mouth every morning ) 90 tablet 3     Continuous Blood Gluc Sensor  (FREESTYLE LISA 2 SENSOR) MISC USE AS DIRECTED EVERY 14 DAYS 2 each 11     cyclobenzaprine (FLEXERIL) 10 MG tablet Take 1 tablet (10 mg) by mouth 2 times daily as needed for muscle spasms 60 tablet 3     diphenhydrAMINE (BENADRYL ALLERGY) 25 MG capsule Take 1 capsule (25 mg) by mouth every 6 hours as needed for itching or allergies 56 capsule 0     DULoxetine (CYMBALTA) 30 MG capsule Take 30 mg by mouth 2 times daily       estradiol (VAGIFEM) 10 MCG TABS vaginal tablet INSERT 1 TABLET(10 MCG) VAGINALLY 2 TIMES A WEEK 24 tablet 2     famotidine (PEPCID) 20 MG tablet Take 1 tablet (20 mg) by mouth daily 90 tablet 3     Glucagon (GVOKE HYPOPEN 1-PACK) 1 MG/0.2ML SOAJ Inject 1 mg Subcutaneous once as needed (hypoglycemia with loss of consciousness) 0.2 mL 1     glucose 40 % GEL gel Take 15-30 g by mouth every 15 minutes as needed for low blood sugar 3 Tube 2     Hydroactive Dressings (TEGADERM HYDROCOLLOID THIN) MISC Use under sensor site , change every 14 days 2 each 11     HYDROmorphone, STANDARD CONC, (DILAUDID) 1 MG/ML oral solution Take 1-2 mLs (1-2 mg) by mouth every 4 hours as needed for moderate to severe pain (Post G and J tube placement) 168 mL 0     ibuprofen (ADVIL/MOTRIN) 400 MG tablet Take 1 tablet (400 mg) by mouth every 6 hours as needed for moderate pain 30 tablet 0     levothyroxine (SYNTHROID/LEVOTHROID) 137 MCG tablet Take 1 tablet (137 mcg) by mouth daily 90 tablet 3     Lidocaine (LIDOCARE) 4 % Patch Place 1 patch onto the skin every 24 hours To prevent lidocaine toxicity, patient should be patch free for 12 hrs daily. 10 patch 0     montelukast (SINGULAIR) 10 MG tablet TAKE 1 TABLET(10 MG) BY MOUTH AT BEDTIME 90 tablet 3     Nutritional Supplements (BOOST HIGH PROTEIN) LIQD After above baseline labs are drawn, give: 6 mL/kg to maximum of 360 mL; the beverage is to be consumed within 5 minutes.  0     omeprazole (PRILOSEC) 40 MG DR capsule Take 1 capsule (40 mg) by mouth daily 90 capsule 3      omeprazole (PRILOSEC) 40 MG DR capsule Take 1 capsule (40 mg) by mouth 2 times daily 180 capsule 3     order for DME Equipment being ordered:Cefaly 1 Device 0     oxyCODONE-acetaminophen (PERCOCET) 5-325 MG tablet Take 1 tablet by mouth every 6 hours as needed for pain 30 tablet 0     polyethylene glycol (MIRALAX) 17 GM/Dose powder Take 17 g (1 capful) by mouth daily (Patient taking differently: Take 1 capful by mouth every morning ) 507 g 11     prochlorperazine (COMPAZINE) 10 MG tablet Take 0.5 tablets (5 mg) by mouth every 6 hours as needed for nausea or vomiting 60 tablet 2     prochlorperazine (COMPAZINE) 5 MG tablet Take 1 tablet (5 mg) by mouth every 6 hours as needed Take two 5 mg tablets by mouth every six hours as needed for nausea. 60 tablet 1     promethazine (PHENERGAN) 25 MG tablet Take 1 tablet (25 mg) by mouth daily as needed (take as needed for nausea/vomiting) 60 tablet 0     Prucalopride Succinate (MOTEGRITY) 2 MG TABS Take 2 mg by mouth daily (Patient taking differently: Take 2 mg by mouth every morning ) 30 tablet 11     scopolamine (TRANSDERM) 1 MG/3DAYS 72 hr patch Place 1 patch onto the skin every 72 hours 10 patch 11     senna-docusate (SENOKOT-S;PERICOLACE) 8.6-50 MG per tablet Take 1-2 tablets by mouth 2 times daily (Patient taking differently: Take 1-2 tablets by mouth every morning ) 100 tablet 0     Sharps Container (BD SHARPS ) MISC 1 Container as needed 1 each 1     ZOLMitriptan (ZOMIG) 5 MG tablet Take 1 tablet (5 mg) by mouth at onset of headache for migraine 9 tablet 11       REVIEW OF SYSTEMS:    Answers for HPI/ROS submitted by the patient on 5/2/2022  General Symptoms: Yes  Skin Symptoms: No  HENT Symptoms: Yes  EYE SYMPTOMS: Yes  HEART SYMPTOMS: No  LUNG SYMPTOMS: Yes  INTESTINAL SYMPTOMS: Yes  URINARY SYMPTOMS: Yes  GYNECOLOGIC SYMPTOMS: No  BREAST SYMPTOMS: No  SKELETAL SYMPTOMS: Yes  BLOOD SYMPTOMS: No  NERVOUS SYSTEM SYMPTOMS: Yes  MENTAL HEALTH SYMPTOMS: No  Ear  pain: No  Ear discharge: No  Hearing loss: No  Tinnitus: No  Nosebleeds: No  Congestion: Yes  Sinus pain: Yes  Trouble swallowing: Yes   Voice hoarseness: Yes  Mouth sores: No  Sore throat: Yes  Tooth pain: No  Gum tenderness: No  Bleeding gums: No  Change in taste: No  Change in sense of smell: No  Dry mouth: Yes  Hearing aid used: No  Neck lump: No  Fever: No  Loss of appetite: No  Weight loss: No  Weight gain: No  Fatigue: Yes  Night sweats: Yes  Chills: Yes  Increased stress: No  Excessive hunger: No  Excessive thirst: No  Feeling hot or cold when others believe the temperature is normal: Yes  Loss of height: No  Post-operative complications: No  Surgical site pain: Yes  Hallucinations: No  Change in or Loss of Energy: Yes  Hyperactivity: No  Confusion: No  Eye pain: No  Vision loss: No  Dry eyes: Yes  Watery eyes: No  Eye bulging: No  Double vision: No  Flashing of lights: No  Spots: Yes  Floaters: No  Redness: No  Crossed eyes: No  Tunnel Vision: No  Yellowing of eyes: No  Eye irritation: Yes  Cough: Yes  Sputum or phlegm: Yes  Coughing up blood: No  Difficulty breating or shortness of breath: No  Snoring: No  Wheezing: No  Difficulty breathing on exertion: No  Nighttime Cough: Yes  Difficulty breathing when lying flat: No  Heart burn or indigestion: No  Nausea: Yes  Vomiting: Yes  Abdominal pain: Yes  Bloating: Yes  Constipation: Yes  Diarrhea: Yes  Blood in stool: No  Black stools: No  Rectal or Anal pain: No  Fecal incontinence: No  Yellowing of skin or eyes: No  Vomit with blood: No  Change in stools: No  Trouble holding urine or incontinence: No  Pain or burning: No  Trouble starting or stopping: No  Increased frequency of urination: Yes  Blood in urine: No  Decreased frequency of urination: No  Frequent nighttime urination: No  Flank pain: Yes  Difficulty emptying bladder: No  Back pain: No  Muscle aches: Yes  Neck pain: Yes  Swollen joints: No  Joint pain: Yes  Bone pain: No  Muscle cramps: Yes  Muscle  weakness: No  Joint stiffness: No  Bone fracture: No  Trouble with coordination: No  Dizziness or trouble with balance: No  Fainting or black-out spells: No  Memory loss: No  Headache: Yes  Seizures: No  Speech problems: No  Tingling: No  Tremor: No  Weakness: No  Difficulty walking: No  Paralysis: No  Numbness: No        O:   There were no vitals taken for this visit.  GENERAL APPEARANCE: healthy, alert and no distress  RESP: lungs clear to auscultation - no rales, rhonchi or wheezes  CV: regular rates and rhythm, normal S1 S2, no S3 or S4 and no murmur, click or rub   NEURO: alert and oriented.  MUSK: normal.  SKIN: normal.  EXT: warm.  Edema: none .  PSYCHE: normal    A/P:  Gastroparesis  Medication interactions were checked on Epic and [employer] but no candelaria contraindications were evident. I am referring her to GI PharmD for consultation.   Dinora was seen today for headache.    Migraine with aura and without status migrainosus, not intractable  -     Adult Neurology  Referral      The patient voiced understanding of the information discussed and all questions were answered.     Total time spent today with this patient including chart review, exam time with patient and documentation : 50 minutes.       Latasha INGRAM, CNP

## 2022-05-03 NOTE — TELEPHONE ENCOUNTER
Called to remind patient of their upcoming appointment with our GI clinic, on 5/9/2022 at 115pm with Dr. Park. This appointment is scheduled as a video visit. You will receive a call approximately 30 minutes prior to check you in, you must be in [place] for this visit., if your appointment is virtual (video or telephone) you need to be in [place] for the visit. To reschedule or cancel patient to call [phone].    Saundra Jaquez [facility]

## 2022-05-04 DIAGNOSIS — R11.0 NAUSEA: ICD-10-CM

## 2022-05-05 ENCOUNTER — VIRTUAL VISIT (OUTPATIENT)
Dept: ANESTHESIOLOGY | Facility: CLINIC | Age: 57
End: 2022-05-05
Payer: COMMERCIAL

## 2022-05-05 DIAGNOSIS — K31.84 GASTROPARESIS: ICD-10-CM

## 2022-05-05 DIAGNOSIS — R10.84 ABDOMINAL PAIN, GENERALIZED: ICD-10-CM

## 2022-05-05 PROCEDURE — 99213 OFFICE O/P EST LOW 20 MIN: CPT | Mod: 95 | Performed by: ANESTHESIOLOGY

## 2022-05-05 RX ORDER — CYCLOBENZAPRINE HCL 10 MG
10 TABLET ORAL 2 TIMES DAILY PRN
Qty: 90 TABLET | Refills: 3 | Status: ON HOLD | OUTPATIENT
Start: 2022-05-05 | End: 2023-02-02

## 2022-05-05 ASSESSMENT — PAIN SCALES - GENERAL: PAINLEVEL: MODERATE PAIN (4)

## 2022-05-05 NOTE — PATIENT INSTRUCTIONS
Medications:    Flexeril refilled.  Take 1 tablet (10 mg) by mouth 2 times daily as needed for muscle spasms.    Recommend to stop Duloxetine since it has not been helpful.      *Please provide the clinic with a minium of 1 week notice, on all prescription refills.       Treatment planning:    Continue follow up with PT and gastroenterology specialists regarding motility disorders.      Recommended Follow up:      Follow up in 3 months, video is okay.         Please call [phone], option #1 to schedule your clinic appointment if you don't already have an appointment scheduled.        To speak with a nurse, schedule/reschedule/cancel a clinic appointment, or request a medication refill call: [phone], option #1.    You can also reach us by [employer]: [email or web address]

## 2022-05-05 NOTE — PROGRESS NOTES
Type of service:  Video Visit    Video Start Time: 12:41 PM    Video End Time:12:52 PM    Originating Location (pt. Location): Home    Distant Location (provider location):  [facility] [place]     Platform used for Video Visit: [facility]    Pain clinic follow up note    HPI:   Dinora Mcghee is a 57 year old year old female  who presents to the pain clinic with chronic abdominal pain for follow up.    Patient Supplied Answers To the [facility] Pain Questionnaire  [facility] Pain -  Patient Entered Questionnaire/Answers 5/5/2022   What number best describes your pain right now:  0 = No pain  to  10 = Worst pain imaginable 4   How would you describe the pain? sharp   Which of the following worsen your pain? sitting   Which of the following improve or reduce your pain?  nothing relieves the pain   What number best describes your average pain for the past week:  0 = No pain  to  10 = Worst pain imaginable 4   What number best describes your LOWEST pain in past 24 hours:  0 = No pain  to  10 = Worst pain imaginable 0   What number best describes your WORST pain in past 24 hours:  0 = No pain  to  10 = Worst pain imaginable 7   When is your pain worst? Constant   What non-medicine treatments have you already had for your pain? pain clinic, physical therapy, relaxation training, acupuncture, chiropractic care   Have you tried treating your pain with medication?  Yes   Are you currently taking medications for your pain? Yes             Mariposa presents for follow up from her home office. She states that last month she had covid and is recovering. Fatigue is bothersome with the covid recovery. She is receiving PT 3 times a week and the therapist is focusing on her vagus nerve. The patient started doluxetine 2 times a day. Denies any side effects. But the medication does not improve her symptoms. She finds flexeril some what helpful and would like a refill. The patient was instructed to  discontinue amitriptyline.   She reports a fullness sensation with constipation. Bowel movements acutely worsen her pain.     ROS:  Review of Systems   All other systems reviewed and are negative.        Significant Medical History:   Past Medical History:   Diagnosis Date     Allergic rhinitis, cause unspecified      Allergy to other foods     strawberries, apples, celeries, alice, watermelon     Arthritis     left knee     Choledocholithiasis     long after cholecystectomy     Chronic abdominal pain      Chronic constipation      Chronic nausea      Chronic pancreatitis (H)      Degeneration of lumbar or lumbosacral intervertebral disc      Esophageal reflux     w/ hiatal hernia     Gastroparesis      Hiatal hernia      History of pituitary adenoma     s/p resection     Hypothyroidism      Migraines      Mild hyperlipidemia      On tube feeding diet     presence of GJ tube     Pancreatic disease     PD stricture, 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 endometriosis growth     APPENDECTOMY        SECTION       COLONOSCOPY       ENDOSCOPIC INSERTION TUBE GASTROSTOMY N/A 2021    Procedure: Gastrojejonostomy placement with jejunopexy, PEG tube exchange;  Surgeon: Zackery Montoya MD;  [facility]     ENDOSCOPIC RETROGRADE CHOLANGIOPANCREATOGRAM N/A 2015    Procedure: ENDOSCOPIC RETROGRADE CHOLANGIOPANCREATOGRAM;  Surgeon: Mandeep Park MD;  [facility]     ENDOSCOPIC RETROGRADE CHOLANGIOPANCREATOGRAM N/A 2016    Procedure: COMBINED ENDOSCOPIC RETROGRADE CHOLANGIOPANCREATOGRAPHY, PLACE TUBE/STENT;  Surgeon: Mandeep Park MD;  [facility]     ENDOSCOPIC RETROGRADE  CHOLANGIOPANCREATOGRAM N/A 3/17/2016    Procedure: COMBINED ENDOSCOPIC RETROGRADE CHOLANGIOPANCREATOGRAPHY, REMOVE FOREIGN BODY OR STENT/TUBE;  Surgeon: Mandeep Park MD;  [facility]     ENDOSCOPIC RETROGRADE CHOLANGIOPANCREATOGRAM N/A 8/2/2016    Procedure: COMBINED ENDOSCOPIC RETROGRADE CHOLANGIOPANCREATOGRAPHY, PLACE TUBE/STENT;  Surgeon: Mandeep Park MD;  [facility]     ENDOSCOPIC RETROGRADE CHOLANGIOPANCREATOGRAM N/A 8/26/2016    Procedure: COMBINED ENDOSCOPIC RETROGRADE CHOLANGIOPANCREATOGRAPHY, REMOVE FOREIGN BODY OR STENT/TUBE;  Surgeon: Mandeep Park MD;  [facility]     ENDOSCOPIC ULTRASOUND UPPER GASTROINTESTINAL TRACT (GI) N/A 10/3/2016    Procedure: ENDOSCOPIC ULTRASOUND, ESOPHAGOSCOPY / UPPER GASTROINTESTINAL TRACT (GI);  Surgeon: Guru Jose Rodas MD;  [facility]     ENTEROSCOPY SMALL BOWEL N/A 2/3/2022    Procedure: ENTEROSCOPY with possible fistula closure;  Surgeon: Francisco Dodson MD;  Location:  GI     ESOPHAGOSCOPY, GASTROSCOPY, DUODENOSCOPY (EGD), COMBINED N/A 6/24/2015    Procedure: COMBINED ESOPHAGOSCOPY, GASTROSCOPY, DUODENOSCOPY (EGD), REMOVE FOREIGN BODY;  Surgeon: Mandeep Park MD;  Location:  GI     ESOPHAGOSCOPY, GASTROSCOPY, DUODENOSCOPY (EGD), COMBINED N/A 10/25/2015    Procedure: COMBINED ESOPHAGOSCOPY, GASTROSCOPY, DUODENOSCOPY (EGD);  Surgeon: Sammy Amaro MD;  Location:  GI     ESOPHAGOSCOPY, GASTROSCOPY, DUODENOSCOPY (EGD), COMBINED N/A 10/25/2015    Procedure: COMBINED ESOPHAGOSCOPY, GASTROSCOPY, DUODENOSCOPY (EGD), BIOPSY SINGLE OR MULTIPLE;  Surgeon: Sammy Amaro MD;  Location:  GI     ESOPHAGOSCOPY, GASTROSCOPY, DUODENOSCOPY (EGD), DILATATION, COMBINED       EXCISE LESION TRUNK N/A 4/17/2017    Procedure: EXCISE LESION TRUNK;  Removal of Abdominal Foreign Body;  Surgeon: Nestor Phoenix MD;  [facility]     HC ESOPH/GAS REFLUX TEST W NASAL IMPED >1 HR N/A 11/19/2015     Procedure: ESOPHAGEAL IMPEDENCE FUNCTION TEST WITH 24 HOUR PH GREATER THAN 1 HOUR;  Surgeon: Thiago Apple MD;  [facility]     HC UGI ENDOSCOPY DIAG W BIOPSY  9/17/08     HC UGI ENDOSCOPY DIAG W BIOPSY  9/27/12     HC UGI ENDOSCOPY W ESOPHAGEAL DILATION BALLOON <30MM  9/17/08     HC UGI ENDOSCOPY W EUS N/A 5/5/2015    Procedure: COMBINED ENDOSCOPIC ULTRASOUND, ESOPHAGOSCOPY, GASTROSCOPY, DUODENOSCOPY (EGD);  Surgeon: Wm Dueñas MD;  [facility]     HC WRIST ARTHROSCOP,RELEASE XVERS LIG Bilateral 12/17/08     INJECT TRANSVERSUS ABDOMINIS PLANE (TAP) BLOCK BILATERAL Left 9/22/2016    Procedure: INJECT TRANSVERSUS ABDOMINIS PLANE (TAP) BLOCK BILATERAL;  Surgeon: Dickson Corrigan MD;  [facility]     laparoscopic pineda  1995     LAPAROSCOPIC HERNIORRHAPHY INCISIONAL N/A 8/23/2018    Procedure: LAPAROSCOPIC HERNIORRHAPHY INCISIONAL;  Laparoscopic Incisional Hernia Repair with Symbotex Mesh Implant;  Surgeon: Nestor Phoenix MD;  [facility]     LAPAROSCOPIC PANCREATECTOMY, TRANSPLANT AUTO ISLET CELL N/A 12/28/2016    Procedure: LAPAROSCOPIC PANCREATECTOMY, TRANSPLANT AUTO ISLET CELL;  Surgeon: Nestor Phoenix MD;  [facility]     REMOVE GASTROSTOMY TUBE ADULT N/A 1/28/2022    Procedure: REMOVAL, GASTROSTOMY TUBE, ADULT;  Surgeon: Mandeep Park MD;  [facility]     REPLACE GASTROJEJUNOSTOMY TUBE, PERCUTANEOUS N/A 9/7/2021    Procedure: GASTROJEJUNOSTOMY TUBE PLACEMENT, PERCUTANEOUS, WITH GASTROPEXY;  Surgeon: Mandeep Park MD;  [facility]     REPLACE GASTROJEJUNOSTOMY TUBE, PERCUTANEOUS N/A 9/22/2021    Procedure: REPLACEMENT, GASTROJEJUNOSTOMY TUBE, PERCUTANEOUS;  Surgeon: Zackery Montoya MD;  [facility]     REPLACE JEJUNOSTOMY TUBE, PERCUTANEOUS N/A 9/10/2021    Procedure: UPPER ENDOSCOPY, REPLACEMENT OF PERCUTANEOUS GASTROJEJUNOSTOMY TUBE, T-TAG GASTROPEXY;  Surgeon: Zackery Montoya MD;  [facility]     REPLACE JEJUNOSTOMY TUBE,  PERCUTANEOUS N/A 2021    Procedure: REPLACEMENT, JEJUNOSTOMY TUBE, PERCUTANEOUS;  Surgeon: Mandeep Park MD;  [facility]     transphenoidal pituitary resection       Z  DELIVERY ONLY       Z  DELIVERY ONLY      repeat c section with incidental cystotomy with repair     ZZC EXCIS PITUITARY,TRANSNASAL/SEPTAL  1980    pituitary tumor removed for diabetes insipidus     ZZC TOTAL ABDOM HYSTERECTOMY      w/ bilateral salpingoophorectomy           Family History:  Family History   Problem Relation Age of Onset     Lipids Mother      Hypertension Mother      Thyroid Disease Mother      Depression Mother      Angina Mother      GERD Mother      Skin Cancer Mother      Migraines Mother      Eye Disorder Father         cataract, detached retina     Myocardial Infarction Father 60     Lipids Father      Cerebrovascular Disease Father      Depression Father      Substance Abuse Father      Anesthesia Reaction Father         stroke right after surgery     Cataracts Father      Osteoarthritis Father      Ulcerative Colitis Father      Interpersonal Violence Sister      Coronary Artery Disease Sister         angioplasty     GERD Sister      Substance Abuse Sister      Depression Sister      Thyroid Disease Sister      Eye Disorder Maternal Grandmother         cataract     Thyroid Disease Maternal Grandmother      Diabetes Maternal Grandfather      Eye Disorder Paternal Grandmother         cataract     Eye Disorder Paternal Grandfather         cataract     Diabetes Paternal Grandfather      Eye Disorder Son         ptosis     Depression Son      Anxiety Disorder Son      Heart Disease Paternal Aunt      Diabetes Paternal Aunt      Diabetes Paternal Uncle      Heart Disease Paternal Uncle      Depression Nephew      Anxiety Disorder Nephew      Thyroid Disease Nephew      Diabetes Type 2  Cousin         paternal cousin          Social History:  Social History     Socioeconomic  History     Marital status:      Spouse name: Norris     Number of children: 3     Years of education: 16     Highest education level: Not on file   Occupational History     Occupation: director     Employer: [employer]   Tobacco Use     Smoking status: Former Smoker     Packs/day: 0.50     Years: 6.00     Pack years: 3.00     Types: Cigarettes     Start date: 1985     Quit date: 1992     Years since quittin.3     Smokeless tobacco: Never Used     Tobacco comment: no 2nd hand   Substance and Sexual Activity     Alcohol use: Not Currently     Alcohol/week: 3.0 - 6.0 standard drinks     Types: 1 - 2 Glasses of wine, 1 - 2 Cans of beer, 1 - 2 Shots of liquor per week     Comment: none since IGG     Drug use: No     Sexual activity: Yes     Partners: Male     Birth control/protection: None     Comment: Hystectomy    Other Topics Concern     Parent/sibling w/ CABG, MI or angioplasty before 65F 55M? No      Service Not Asked     Blood Transfusions No     Caffeine Concern Not Asked     Occupational Exposure Not Asked     Hobby Hazards Not Asked     Sleep Concern Not Asked     Stress Concern Not Asked     Weight Concern Not Asked     Special Diet Not Asked     Back Care Not Asked     Exercise Not Asked     Bike Helmet Not Asked     Seat Belt Yes     Self-Exams Not Asked   Social History Narrative     with 3 children and a dog.  No smoking, etoh or drug use.  Worked as a  for [employer] in [place] in the past.  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     Social Determinants of Health     Financial Resource Strain: Not on file   Food Insecurity: Not on file   Transportation Needs: Not on file   Physical Activity: Not on file   Stress: Not on file   Social Connections: Not on file   Intimate Partner Violence: Not on file   Housing Stability: Not on file     Social History     Social History Narrative     with 3 children and a dog.  No  smoking, etoh or drug use.  Worked as a  for [employer] in [place] in the past.  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          Allergies:  Allergies   Allergen Reactions     Apple Anaphylaxis     Corticosteroids Other (See Comments)     All oral, IV and injectable steroids are contraindicated (unless in life threatening situations) in [facility] transplant recipients. They can cause irreversible loss of islet cell function. Please contact patient's transplant care coordinator ADI Gaffney RN at [phone]/pager [phone] and/or endocrinologist prior to administration.       Depakote [Divalproex Sodium] Other (See Comments)     Chest pain     Zithromax [Azithromycin Dihydrate] Anaphylaxis     Noted in 4/7/08 ER     Bromfenac      Other reaction(s): Headache     Codeine      Other reaction(s): Hallucinations     Darvocet [Propoxyphene N-Apap] Itching     Hydromorphone Other (See Comments)     Loopy     Morphine Nausea and Vomiting and Rash     Nalbuphine Hcl Rash     RASH :nubaine     Zosyn [Piperacillin-Tazobactam In D5w] Rash     Possible allergy, did have a diffuse rash that seemed drug related but could have also been related to soap in the hospital.      Bactrim [Sulfamethoxazole W-Trimethoprim] Other (See Comments) and Nausea and Vomiting     Severely low liver function.     Compazine [Prochlorperazine] Other (See Comments)     Twitching. Takes Benedryl and is fine     Tape [Adhesive Tape] Blisters     Tramadol      Zofran [Ondansetron] Other (See Comments)     migraine     Flagyl [Metronidazole] Hives and Rash       Current Medications:   Current Outpatient Medications   Medication Sig Dispense Refill     acarbose (PRECOSE) 50 MG tablet Take 1 tablet (50 mg) by mouth 3 times daily (with meals) Increase to 100 mg three times daily as needed. 180 tablet 5     amylase-lipase-protease (CREON 24) 25438-60892 units CPEP per EC capsule Take 1-2 capsules by  mouth Take with snacks or supplements       azelastine (ASTELIN) 0.1 % nasal spray Spray 1 spray into both nostrils 2 times daily 1 Bottle 11     blood glucose (PAT CONTOUR NEXT) test strip Use to test blood sugar 6 times daily or as directed. 2 Box 11     blood glucose monitoring (PAT MICROLET) lancets Use to test blood sugar 6-8 times daily or as directed. 100 each 11     cetirizine (ZYRTEC) 10 MG tablet Take 1 tablet (10 mg) by mouth daily (Patient taking differently: Take 10 mg by mouth every morning) 90 tablet 3     Continuous Blood Gluc Sensor (FREESTYLE LISA 2 SENSOR) MISC Inject 1 patch into the skin every 14 days       Continuous Blood Gluc Sensor (FREESTYLE LISA 2 SENSOR) MISC USE AS DIRECTED EVERY 14 DAYS 2 each 11     cyclobenzaprine (FLEXERIL) 10 MG tablet Take 1 tablet (10 mg) by mouth 2 times daily as needed for muscle spasms 60 tablet 3     diphenhydrAMINE (BENADRYL ALLERGY) 25 MG capsule Take 1 capsule (25 mg) by mouth every 6 hours as needed for itching or allergies 56 capsule 0     DULoxetine (CYMBALTA) 30 MG capsule Take 30 mg by mouth 2 times daily       estradiol (VAGIFEM) 10 MCG TABS vaginal tablet INSERT 1 TABLET(10 MCG) VAGINALLY 2 TIMES A WEEK 24 tablet 2     famotidine (PEPCID) 20 MG tablet Take 1 tablet (20 mg) by mouth daily 90 tablet 3     Glucagon (GVOKE HYPOPEN 1-PACK) 1 MG/0.2ML SOAJ Inject 1 mg Subcutaneous once as needed (hypoglycemia with loss of consciousness) 0.2 mL 1     glucose 40 % GEL gel Take 15-30 g by mouth every 15 minutes as needed for low blood sugar 3 Tube 2     Hydroactive Dressings (TEGADERM HYDROCOLLOID THIN) MISC Use under sensor site , change every 14 days 2 each 11     HYDROmorphone, STANDARD CONC, (DILAUDID) 1 MG/ML oral solution Take 1-2 mLs (1-2 mg) by mouth every 4 hours as needed for moderate to severe pain (Post G and J tube placement) 168 mL 0     ibuprofen (ADVIL/MOTRIN) 400 MG tablet Take 1 tablet (400 mg) by mouth every 6 hours as needed for  moderate pain 30 tablet 0     levothyroxine (SYNTHROID/LEVOTHROID) 137 MCG tablet Take 1 tablet (137 mcg) by mouth daily 90 tablet 3     Lidocaine (LIDOCARE) 4 % Patch Place 1 patch onto the skin every 24 hours To prevent lidocaine toxicity, patient should be patch free for 12 hrs daily. 10 patch 0     montelukast (SINGULAIR) 10 MG tablet TAKE 1 TABLET(10 MG) BY MOUTH AT BEDTIME 90 tablet 3     Multiple Vitamin (MULTIVITAMIN ADULT PO) Take 1 tablet by mouth daily       Nutritional Supplements (BOOST HIGH PROTEIN) LIQD After above baseline labs are drawn, give: 6 mL/kg to maximum of 360 mL; the beverage is to be consumed within 5 minutes.  0     omeprazole (PRILOSEC) 40 MG DR capsule Take 1 capsule (40 mg) by mouth 2 times daily 180 capsule 3     order for DME Equipment being ordered:Cefaly 1 Device 0     oxyCODONE-acetaminophen (PERCOCET) 5-325 MG tablet Take 1 tablet by mouth every 6 hours as needed for pain 30 tablet 0     polyethylene glycol (MIRALAX) 17 GM/Dose powder Take 17 g (1 capful) by mouth daily (Patient taking differently: Take 1 capful by mouth every morning) 507 g 11     prochlorperazine (COMPAZINE) 10 MG tablet Take 0.5 tablets (5 mg) by mouth every 6 hours as needed for nausea or vomiting 60 tablet 2     promethazine (PHENERGAN) 25 MG tablet Take 1 tablet (25 mg) by mouth daily as needed (take as needed for nausea/vomiting) 60 tablet 0     Prucalopride Succinate (MOTEGRITY) 2 MG TABS Take 2 mg by mouth daily (Patient taking differently: Take 2 mg by mouth every morning) 30 tablet 11     scopolamine (TRANSDERM) 1 MG/3DAYS 72 hr patch Place 1 patch onto the skin every 72 hours 10 patch 11     senna-docusate (SENOKOT-S;PERICOLACE) 8.6-50 MG per tablet Take 1-2 tablets by mouth 2 times daily (Patient taking differently: Take 1-2 tablets by mouth every morning) 100 tablet 0     Sharps Container (BD SHARPS ) MISC 1 Container as needed 1 each 1     ZOLMitriptan (ZOMIG) 5 MG tablet Take 1 tablet (5  mg) by mouth at onset of headache for migraine 9 tablet 11     amitriptyline (ELAVIL) 50 MG tablet Take 1 tablet (50 mg) by mouth At Bedtime (Patient not taking: Reported on 5/5/2022) 90 tablet 3     amylase-lipase-protease (CREON) 07970-20595 units CPEP per EC capsule Take 3-4 capsules by mouth 3 times daily (with meals) With oral meals (Patient not taking: Reported on 5/5/2022) 150 capsule 11     omeprazole (PRILOSEC) 40 MG DR capsule Take 1 capsule (40 mg) by mouth daily (Patient not taking: Reported on 5/5/2022) 90 capsule 3     prochlorperazine (COMPAZINE) 5 MG tablet Take 1 tablet (5 mg) by mouth every 6 hours as needed Take two 5 mg tablets by mouth every six hours as needed for nausea. (Patient not taking: Reported on 5/5/2022) 60 tablet 1        Physical Exam:     There were no vitals taken for this visit.    General Appearance: No distress, seated comfortably  Mood: Euthymic  HE ENT: Non constricted pupils  Respiratory: Non labored breathing      ASSESSMENT AND PLAN:     Encounter Diagnosis:  Gastroparesis  Chronic abdominal pain  Visceral pain       Dinora Mcghee is a 57 year old year old female  who presents to the pain clinic with severe chronic abdominal pain for follow up     RECOMMENDATIONS:     1. Medications: We are prescribing the patient refill of flexeril.  Recommended the patient to stop duloxetine since it has not been helpful.  Dosing, side effects, risks/benefits/alternatives were discussed with the patient in detail.    2.  Continue follow up with PT and gastroenterology specialists regarding motility disorders.       Follow up: 3 months. Video is ok

## 2022-05-05 NOTE — NURSING NOTE
Patient presents with:  Follow Up: Follow-up Video  Mid Abdominal pain Pain Level 3/10      Moderate Pain (4)     Pain Medications     Opioid Combinations Refills Start End     oxyCODONE-acetaminophen (PERCOCET) 5-325 MG tablet    0 1/5/2022     Sig - Route: Take 1 tablet by mouth every 6 hours as needed for pain - Oral    Class: Local Print    Earliest Fill Date: 1/5/2022    Opioid Agonists Refills Start End     HYDROmorphone, STANDARD CONC, (DILAUDID) 1 MG/ML oral solution    0 11/28/2021     Sig - Route: Take 1-2 mLs (1-2 mg) by mouth every 4 hours as needed for moderate to severe pain (Post G and J tube placement) - Oral    Class: Local Print    Earliest Fill Date: 11/28/2021          What medications are you using for pain? Tylenol, Dilaudid,     (New patients only) Have you been seen by another pain clinic/ provider? No    (Return Patients only) What refills are you needing today? No

## 2022-05-05 NOTE — PROGRESS NOTES
Dinora is a 57 year old who is being evaluated via a billable video visit.      How would you like to obtain your AVS? MyChart  If the video visit is dropped, the invitation should be resent by: Text to cell phone: [phone]  Will anyone else be joining your video visit? No

## 2022-05-05 NOTE — LETTER
5/5/2022       RE: Dinora Mcghee  [address] [facility] [address]     Dear Colleague,    Thank you for referring your patient, Dinora Mcghee, to the [facility] [place] at [facility]. Please see a copy of my visit note below.    Dinora is a 57 year old who is being evaluated via a billable video visit.      How would you like to obtain your AVS? MyChart  If the video visit is dropped, the invitation should be resent by: Text to cell phone: [phone]  Will anyone else be joining your video visit? No              Type of service:  Video Visit    Video Start Time: 12:41 PM    Video End Time:12:52 PM    Originating Location (pt. Location): Home    Distant Location (provider location):  [facility] [place]     Platform used for Video Visit: [facility]    Pain clinic follow up note    HPI:   Dinora Mcghee is a 57 year old year old female  who presents to the pain clinic with chronic abdominal pain for follow up.    Patient Supplied Answers To the [facility] Pain Questionnaire  [facility] -  Patient Entered Questionnaire/Answers 5/5/2022   What number best describes your pain right now:  0 = No pain  to  10 = Worst pain imaginable 4   How would you describe the pain? sharp   Which of the following worsen your pain? sitting   Which of the following improve or reduce your pain?  nothing relieves the pain   What number best describes your average pain for the past week:  0 = No pain  to  10 = Worst pain imaginable 4   What number best describes your LOWEST pain in past 24 hours:  0 = No pain  to  10 = Worst pain imaginable 0   What number best describes your WORST pain in past 24 hours:  0 = No pain  to  10 = Worst pain imaginable 7   When is your pain worst? Constant   What non-medicine treatments have you already had for your pain? pain clinic, physical therapy, relaxation  training, acupuncture, chiropractic care   Have you tried treating your pain with medication?  Yes   Are you currently taking medications for your pain? Yes             Mariposa presents for follow up from her home office. She states that last month she had covid and is recovering. Fatigue is bothersome with the covid recovery. She is receiving PT 3 times a week and the therapist is focusing on her vagus nerve. The patient started doluxetine 2 times a day. Denies any side effects. But the medication does not improve her symptoms. She finds flexeril some what helpful and would like a refill. The patient was instructed to discontinue amitriptyline.   She reports a fullness sensation with constipation. Bowel movements acutely worsen her pain.     ROS:  Review of Systems   All other systems reviewed and are negative.        Significant Medical History:   Past Medical History:   Diagnosis Date     Allergic rhinitis, cause unspecified      Allergy to other foods     strawberries, apples, celeries, alice, watermelon     Arthritis     left knee     Choledocholithiasis     long after cholecystectomy     Chronic abdominal pain      Chronic constipation      Chronic nausea      Chronic pancreatitis (H)      Degeneration of lumbar or lumbosacral intervertebral disc      Esophageal reflux     w/ hiatal hernia     Gastroparesis      Hiatal hernia      History of pituitary adenoma     s/p resection     Hypothyroidism      Migraines      Mild hyperlipidemia      On tube feeding diet     presence of GJ tube     Pancreatic disease     PD stricture, 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 endometriosis growth     APPENDECTOMY        SECTION        COLONOSCOPY  2014     ENDOSCOPIC INSERTION TUBE GASTROSTOMY N/A 11/28/2021    Procedure: Gastrojejonostomy placement with jejunopexy, PEG tube exchange;  Surgeon: Zackery Montoya MD;  [facility]     ENDOSCOPIC RETROGRADE CHOLANGIOPANCREATOGRAM N/A 6/2/2015    Procedure: ENDOSCOPIC RETROGRADE CHOLANGIOPANCREATOGRAM;  Surgeon: Mandeep Park MD;  [facility]     ENDOSCOPIC RETROGRADE CHOLANGIOPANCREATOGRAM N/A 2/9/2016    Procedure: COMBINED ENDOSCOPIC RETROGRADE CHOLANGIOPANCREATOGRAPHY, PLACE TUBE/STENT;  Surgeon: Mandeep Park MD;  [facility]     ENDOSCOPIC RETROGRADE CHOLANGIOPANCREATOGRAM N/A 3/17/2016    Procedure: COMBINED ENDOSCOPIC RETROGRADE CHOLANGIOPANCREATOGRAPHY, REMOVE FOREIGN BODY OR STENT/TUBE;  Surgeon: Mandeep Park MD;  [facility]     ENDOSCOPIC RETROGRADE CHOLANGIOPANCREATOGRAM N/A 8/2/2016    Procedure: COMBINED ENDOSCOPIC RETROGRADE CHOLANGIOPANCREATOGRAPHY, PLACE TUBE/STENT;  Surgeon: Mandeep Park MD;  [facility]     ENDOSCOPIC RETROGRADE CHOLANGIOPANCREATOGRAM N/A 8/26/2016    Procedure: COMBINED ENDOSCOPIC RETROGRADE CHOLANGIOPANCREATOGRAPHY, REMOVE FOREIGN BODY OR STENT/TUBE;  Surgeon: Mandeep Park MD;  [facility]     ENDOSCOPIC ULTRASOUND UPPER GASTROINTESTINAL TRACT (GI) N/A 10/3/2016    Procedure: ENDOSCOPIC ULTRASOUND, ESOPHAGOSCOPY / UPPER GASTROINTESTINAL TRACT (GI);  Surgeon: Guru Jose Rodas MD;  [facility]     ENTEROSCOPY SMALL BOWEL N/A 2/3/2022    Procedure: ENTEROSCOPY with possible fistula closure;  Surgeon: Francisco Dodson MD;  Location:  GI     ESOPHAGOSCOPY, GASTROSCOPY, DUODENOSCOPY (EGD), COMBINED N/A 6/24/2015    Procedure: COMBINED ESOPHAGOSCOPY, GASTROSCOPY, DUODENOSCOPY (EGD), REMOVE FOREIGN BODY;  Surgeon: Mandeep Park MD;  [facility]     ESOPHAGOSCOPY, GASTROSCOPY, DUODENOSCOPY (EGD), COMBINED N/A 10/25/2015    Procedure: COMBINED ESOPHAGOSCOPY,  GASTROSCOPY, DUODENOSCOPY (EGD);  Surgeon: Sammy Amaro MD;  [facility]     ESOPHAGOSCOPY, GASTROSCOPY, DUODENOSCOPY (EGD), COMBINED N/A 10/25/2015    Procedure: COMBINED ESOPHAGOSCOPY, GASTROSCOPY, DUODENOSCOPY (EGD), BIOPSY SINGLE OR MULTIPLE;  Surgeon: Sammy Amaro MD;  [facility]     ESOPHAGOSCOPY, GASTROSCOPY, DUODENOSCOPY (EGD), DILATATION, COMBINED       EXCISE LESION TRUNK N/A 4/17/2017    Procedure: EXCISE LESION TRUNK;  Removal of Abdominal Foreign Body;  Surgeon: Nestor Phoenix MD;  [facility]     HC ESOPH/GAS REFLUX TEST W NASAL IMPED >1 HR N/A 11/19/2015    Procedure: ESOPHAGEAL IMPEDENCE FUNCTION TEST WITH 24 HOUR PH GREATER THAN 1 HOUR;  Surgeon: Thiago Apple MD;  Location:  GI      [facility] ENDOSCOPY DIAG W BIOPSY  9/17/08     [facility] ENDOSCOPY DIAG W BIOPSY  9/27/12     [facility] ENDOSCOPY W ESOPHAGEAL DILATION BALLOON <30MM  9/17/08     [facility] ENDOSCOPY W EUS N/A 5/5/2015    Procedure: COMBINED ENDOSCOPIC ULTRASOUND, ESOPHAGOSCOPY, GASTROSCOPY, DUODENOSCOPY (EGD);  Surgeon: Wm Dueñas MD;  Location:  GI      WRIST ARTHROSCOP,RELEASE XVERS LIG Bilateral 12/17/08     INJECT TRANSVERSUS ABDOMINIS PLANE (TAP) BLOCK BILATERAL Left 9/22/2016    Procedure: INJECT TRANSVERSUS ABDOMINIS PLANE (TAP) BLOCK BILATERAL;  Surgeon: Dickson Corrigan MD;  [facility]     laparoscopic pineda  1995     LAPAROSCOPIC HERNIORRHAPHY INCISIONAL N/A 8/23/2018    Procedure: LAPAROSCOPIC HERNIORRHAPHY INCISIONAL;  Laparoscopic Incisional Hernia Repair with Symbotex Mesh Implant;  Surgeon: Nestor Phoenix MD;  [facility]     LAPAROSCOPIC PANCREATECTOMY, TRANSPLANT AUTO ISLET CELL N/A 12/28/2016    Procedure: LAPAROSCOPIC PANCREATECTOMY, TRANSPLANT AUTO ISLET CELL;  Surgeon: Nestor Phoenix MD;  [facility]     REMOVE GASTROSTOMY TUBE ADULT N/A 1/28/2022    Procedure: REMOVAL, GASTROSTOMY TUBE, ADULT;  Surgeon: Mandeep Park MD;  [facility]      REPLACE GASTROJEJUNOSTOMY TUBE, PERCUTANEOUS N/A 2021    Procedure: GASTROJEJUNOSTOMY TUBE PLACEMENT, PERCUTANEOUS, WITH GASTROPEXY;  Surgeon: Mandeep Park MD;  [facility]     REPLACE GASTROJEJUNOSTOMY TUBE, PERCUTANEOUS N/A 2021    Procedure: REPLACEMENT, GASTROJEJUNOSTOMY TUBE, PERCUTANEOUS;  Surgeon: Zackery Montoya MD;  [facility]     REPLACE JEJUNOSTOMY TUBE, PERCUTANEOUS N/A 9/10/2021    Procedure: UPPER ENDOSCOPY, REPLACEMENT OF PERCUTANEOUS GASTROJEJUNOSTOMY TUBE, T-TAG GASTROPEXY;  Surgeon: Zackery Montoya MD;  [facility]     REPLACE JEJUNOSTOMY TUBE, PERCUTANEOUS N/A 2021    Procedure: REPLACEMENT, JEJUNOSTOMY TUBE, PERCUTANEOUS;  Surgeon: Mandeep Park MD;  [facility]     transphenoidal pituitary resection       Z  DELIVERY ONLY       Z  DELIVERY ONLY      repeat c section with incidental cystotomy with repair     [facility] EXCIS PITUITARY,TRANSNASAL/SEPTAL      pituitary tumor removed for diabetes insipidus     Z TOTAL ABDOM HYSTERECTOMY      w/ bilateral salpingoophorectomy           Family History:  Family History   Problem Relation Age of Onset     Lipids Mother      Hypertension Mother      Thyroid Disease Mother      Depression Mother      Angina Mother      GERD Mother      Skin Cancer Mother      Migraines Mother      Eye Disorder Father         cataract, detached retina     Myocardial Infarction Father 60     Lipids Father      Cerebrovascular Disease Father      Depression Father      Substance Abuse Father      Anesthesia Reaction Father         stroke right after surgery     Cataracts Father      Osteoarthritis Father      Ulcerative Colitis Father      Interpersonal Violence Sister      Coronary Artery Disease Sister         angioplasty     GERD Sister      Substance Abuse Sister      Depression Sister      Thyroid Disease Sister      Eye Disorder Maternal Grandmother         cataract      Thyroid Disease Maternal Grandmother      Diabetes Maternal Grandfather      Eye Disorder Paternal Grandmother         cataract     Eye Disorder Paternal Grandfather         cataract     Diabetes Paternal Grandfather      Eye Disorder Son         ptosis     Depression Son      Anxiety Disorder Son      Heart Disease Paternal Aunt      Diabetes Paternal Aunt      Diabetes Paternal Uncle      Heart Disease Paternal Uncle      Depression Nephew      Anxiety Disorder Nephew      Thyroid Disease Nephew      Diabetes Type 2  Cousin         paternal cousin          Social History:  Social History     Socioeconomic History     Marital status:      Spouse name: Norris     Number of children: 3     Years of education: 16     Highest education level: Not on file   Occupational History     Occupation: director     Employer: [employer]   Tobacco Use     Smoking status: Former Smoker     Packs/day: 0.50     Years: 6.00     Pack years: 3.00     Types: Cigarettes     Start date: 1985     Quit date: 1992     Years since quittin.3     Smokeless tobacco: Never Used     Tobacco comment: no 2nd hand   Substance and Sexual Activity     Alcohol use: Not Currently     Alcohol/week: 3.0 - 6.0 standard drinks     Types: 1 - 2 Glasses of wine, 1 - 2 Cans of beer, 1 - 2 Shots of liquor per week     Comment: none since IGG     Drug use: No     Sexual activity: Yes     Partners: Male     Birth control/protection: None     Comment: Hystectomy    Other Topics Concern     Parent/sibling w/ CABG, MI or angioplasty before 65F 55M? No      Service Not Asked     Blood Transfusions No     Caffeine Concern Not Asked     Occupational Exposure Not Asked     Hobby Hazards Not Asked     Sleep Concern Not Asked     Stress Concern Not Asked     Weight Concern Not Asked     Special Diet Not Asked     Back Care Not Asked     Exercise Not Asked     Bike Helmet Not Asked     Seat Belt Yes     Self-Exams Not Asked   Social History  Narrative     with 3 children and a dog.  No smoking, etoh or drug use.  Worked as a  for [employer] in the past.  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     Social Determinants of Health     Financial Resource Strain: Not on file   Food Insecurity: Not on file   Transportation Needs: Not on file   Physical Activity: Not on file   Stress: Not on file   Social Connections: Not on file   Intimate Partner Violence: Not on file   Housing Stability: Not on file     Social History     Social History Narrative     with 3 children and a dog.  No smoking, etoh or drug use.  Worked as a  for [employer] [place] in the past.  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          Allergies:  Allergies   Allergen Reactions     Apple Anaphylaxis     Corticosteroids Other (See Comments)     All oral, IV and injectable steroids are contraindicated (unless in life threatening situations) in [facility] transplant recipients. They can cause irreversible loss of islet cell function. Please contact patient's transplant care coordinator ADI Gaffney RN at [phone]/pager [phone] and/or endocrinologist prior to administration.       Depakote [Divalproex Sodium] Other (See Comments)     Chest pain     Zithromax [Azithromycin Dihydrate] Anaphylaxis     Noted in 4/7/08 ER     Bromfenac      Other reaction(s): Headache     Codeine      Other reaction(s): Hallucinations     Darvocet [Propoxyphene N-Apap] Itching     Hydromorphone Other (See Comments)     Loopy     Morphine Nausea and Vomiting and Rash     Nalbuphine Hcl Rash     RASH :nubaine     Zosyn [Piperacillin-Tazobactam In D5w] Rash     Possible allergy, did have a diffuse rash that seemed drug related but could have also been related to soap in the hospital.      Bactrim [Sulfamethoxazole W-Trimethoprim] Other (See Comments) and Nausea and Vomiting      Severely low liver function.     Compazine [Prochlorperazine] Other (See Comments)     Twitching. Takes Benedryl and is fine     Tape [Adhesive Tape] Blisters     Tramadol      Zofran [Ondansetron] Other (See Comments)     migraine     Flagyl [Metronidazole] Hives and Rash       Current Medications:   Current Outpatient Medications   Medication Sig Dispense Refill     acarbose (PRECOSE) 50 MG tablet Take 1 tablet (50 mg) by mouth 3 times daily (with meals) Increase to 100 mg three times daily as needed. 180 tablet 5     amylase-lipase-protease (CREON 24) 23700-48787 units CPEP per EC capsule Take 1-2 capsules by mouth Take with snacks or supplements       azelastine (ASTELIN) 0.1 % nasal spray Spray 1 spray into both nostrils 2 times daily 1 Bottle 11     blood glucose (PAT CONTOUR NEXT) test strip Use to test blood sugar 6 times daily or as directed. 2 Box 11     blood glucose monitoring (PAT MICROLET) lancets Use to test blood sugar 6-8 times daily or as directed. 100 each 11     cetirizine (ZYRTEC) 10 MG tablet Take 1 tablet (10 mg) by mouth daily (Patient taking differently: Take 10 mg by mouth every morning) 90 tablet 3     Continuous Blood Gluc Sensor (FREESTYLE LISA 2 SENSOR) MISC Inject 1 patch into the skin every 14 days       Continuous Blood Gluc Sensor (FREESTYLE LISA 2 SENSOR) MISC USE AS DIRECTED EVERY 14 DAYS 2 each 11     cyclobenzaprine (FLEXERIL) 10 MG tablet Take 1 tablet (10 mg) by mouth 2 times daily as needed for muscle spasms 60 tablet 3     diphenhydrAMINE (BENADRYL ALLERGY) 25 MG capsule Take 1 capsule (25 mg) by mouth every 6 hours as needed for itching or allergies 56 capsule 0     DULoxetine (CYMBALTA) 30 MG capsule Take 30 mg by mouth 2 times daily       estradiol (VAGIFEM) 10 MCG TABS vaginal tablet INSERT 1 TABLET(10 MCG) VAGINALLY 2 TIMES A WEEK 24 tablet 2     famotidine (PEPCID) 20 MG tablet Take 1 tablet (20 mg) by mouth daily 90 tablet 3     Glucagon (GVOKE HYPOPEN  1-PACK) 1 MG/0.2ML SOAJ Inject 1 mg Subcutaneous once as needed (hypoglycemia with loss of consciousness) 0.2 mL 1     glucose 40 % GEL gel Take 15-30 g by mouth every 15 minutes as needed for low blood sugar 3 Tube 2     Hydroactive Dressings (TEGADERM HYDROCOLLOID THIN) MISC Use under sensor site , change every 14 days 2 each 11     HYDROmorphone, STANDARD CONC, (DILAUDID) 1 MG/ML oral solution Take 1-2 mLs (1-2 mg) by mouth every 4 hours as needed for moderate to severe pain (Post G and J tube placement) 168 mL 0     ibuprofen (ADVIL/MOTRIN) 400 MG tablet Take 1 tablet (400 mg) by mouth every 6 hours as needed for moderate pain 30 tablet 0     levothyroxine (SYNTHROID/LEVOTHROID) 137 MCG tablet Take 1 tablet (137 mcg) by mouth daily 90 tablet 3     Lidocaine (LIDOCARE) 4 % Patch Place 1 patch onto the skin every 24 hours To prevent lidocaine toxicity, patient should be patch free for 12 hrs daily. 10 patch 0     montelukast (SINGULAIR) 10 MG tablet TAKE 1 TABLET(10 MG) BY MOUTH AT BEDTIME 90 tablet 3     Multiple Vitamin (MULTIVITAMIN ADULT PO) Take 1 tablet by mouth daily       Nutritional Supplements (BOOST HIGH PROTEIN) LIQD After above baseline labs are drawn, give: 6 mL/kg to maximum of 360 mL; the beverage is to be consumed within 5 minutes.  0     omeprazole (PRILOSEC) 40 MG DR capsule Take 1 capsule (40 mg) by mouth 2 times daily 180 capsule 3     order for DME Equipment being ordered:Cefaly 1 Device 0     oxyCODONE-acetaminophen (PERCOCET) 5-325 MG tablet Take 1 tablet by mouth every 6 hours as needed for pain 30 tablet 0     polyethylene glycol (MIRALAX) 17 GM/Dose powder Take 17 g (1 capful) by mouth daily (Patient taking differently: Take 1 capful by mouth every morning) 507 g 11     prochlorperazine (COMPAZINE) 10 MG tablet Take 0.5 tablets (5 mg) by mouth every 6 hours as needed for nausea or vomiting 60 tablet 2     promethazine (PHENERGAN) 25 MG tablet Take 1 tablet (25 mg) by mouth daily as  needed (take as needed for nausea/vomiting) 60 tablet 0     Prucalopride Succinate (MOTEGRITY) 2 MG TABS Take 2 mg by mouth daily (Patient taking differently: Take 2 mg by mouth every morning) 30 tablet 11     scopolamine (TRANSDERM) 1 MG/3DAYS 72 hr patch Place 1 patch onto the skin every 72 hours 10 patch 11     senna-docusate (SENOKOT-S;PERICOLACE) 8.6-50 MG per tablet Take 1-2 tablets by mouth 2 times daily (Patient taking differently: Take 1-2 tablets by mouth every morning) 100 tablet 0     Sharps Container (BD SHARPS ) MISC 1 Container as needed 1 each 1     ZOLMitriptan (ZOMIG) 5 MG tablet Take 1 tablet (5 mg) by mouth at onset of headache for migraine 9 tablet 11     amitriptyline (ELAVIL) 50 MG tablet Take 1 tablet (50 mg) by mouth At Bedtime (Patient not taking: Reported on 5/5/2022) 90 tablet 3     amylase-lipase-protease (CREON) 45748-76568 units CPEP per EC capsule Take 3-4 capsules by mouth 3 times daily (with meals) With oral meals (Patient not taking: Reported on 5/5/2022) 150 capsule 11     omeprazole (PRILOSEC) 40 MG DR capsule Take 1 capsule (40 mg) by mouth daily (Patient not taking: Reported on 5/5/2022) 90 capsule 3     prochlorperazine (COMPAZINE) 5 MG tablet Take 1 tablet (5 mg) by mouth every 6 hours as needed Take two 5 mg tablets by mouth every six hours as needed for nausea. (Patient not taking: Reported on 5/5/2022) 60 tablet 1        Physical Exam:     There were no vitals taken for this visit.    General Appearance: No distress, seated comfortably  Mood: Euthymic  HE ENT: Non constricted pupils  Respiratory: Non labored breathing      ASSESSMENT AND PLAN:     Encounter Diagnosis:  Gastroparesis  Chronic abdominal pain  Visceral pain       Dinora Mcghee is a 57 year old year old female  who presents to the pain clinic with severe chronic abdominal pain for follow up     RECOMMENDATIONS:     1. Medications: We are prescribing the patient refill of flexeril.  Recommended the  patient to stop duloxetine since it has not been helpful.  Dosing, side effects, risks/benefits/alternatives were discussed with the patient in detail.    2.  Continue follow up with PT and gastroenterology specialists regarding motility disorders.       Follow up: 3 months. Video is ok

## 2022-05-06 DIAGNOSIS — K31.84 GASTROPARESIS: ICD-10-CM

## 2022-05-06 DIAGNOSIS — R10.84 ABDOMINAL PAIN, GENERALIZED: ICD-10-CM

## 2022-05-06 RX ORDER — PROCHLORPERAZINE MALEATE 10 MG
TABLET ORAL
Qty: 60 TABLET | Refills: 2 | Status: ON HOLD | OUTPATIENT
Start: 2022-05-06 | End: 2023-05-12

## 2022-05-06 NOTE — TELEPHONE ENCOUNTER
3/30/2022  [facility]  Tolerated previously/ ingredient match.

## 2022-05-09 ENCOUNTER — VIRTUAL VISIT (OUTPATIENT)
Dept: GASTROENTEROLOGY | Facility: CLINIC | Age: 57
End: 2022-05-09
Payer: COMMERCIAL

## 2022-05-09 DIAGNOSIS — K31.84 GASTROPARESIS: Primary | ICD-10-CM

## 2022-05-09 PROCEDURE — 99417 PROLNG OP E/M EACH 15 MIN: CPT | Performed by: INTERNAL MEDICINE

## 2022-05-09 PROCEDURE — 99215 OFFICE O/P EST HI 40 MIN: CPT | Mod: 95 | Performed by: INTERNAL MEDICINE

## 2022-05-09 RX ORDER — CYCLOBENZAPRINE HCL 10 MG
TABLET ORAL
Qty: 60 TABLET | OUTPATIENT
Start: 2022-05-09

## 2022-05-09 NOTE — LETTER
5/9/2022         RE: Dinora Mcghee  [address]  [facility] [address]        Dear Colleague,    Thank you for referring your patient, Dinora Mcghee, to the [facility] [place]. Please see a copy of my visit note below.      GI CLINIC VISIT 5/9/2022    Reason for this visit: follow up     HPI: 57 year old female with a PMHx significant for TPIAT with course complicated by gastroparesis who failed intrapyloric botox injection with ongoing sever exacerbation. She's not a candidate for G-POEM given that would be unlikely beneficial. S/p venting G-tube. She was last seen by Dr. Park 3/30/22. Today, she presents for follow up.     She had 3 good days which is considered good for her. We increase hydration to Monday-Wednesday- Friday which she reports has been helping but the difficulty is to find a vein for her -- she's wondering if she can get a port. Phenergan also helps alleviate her symptoms for a whole day period. She's been venting G-tube 4 days a week which sometimes doesn't help and causes her to throw up for 12 hours straight. She's again some weight since the last visit. She also got COVID recently after which she's been having residual constant fatigue. She has to hire someone to help her do the work which has been limited d/t health issues.     IND for domperidone was done which will come in a month.     ROS: 10pt ROS performed and otherwise negative.    PERTINENT PAST MEDICAL/SURGICAL HISTORY:  Past Medical History:   Diagnosis Date     Allergic rhinitis, cause unspecified      Allergy to other foods     strawberries, apples, celeries, alice, watermelon     Arthritis     left knee     Choledocholithiasis     long after cholecystectomy     Chronic abdominal pain      Chronic constipation      Chronic nausea      Chronic pancreatitis (H)      Degeneration of lumbar or lumbosacral intervertebral disc      Esophageal reflux     w/ hiatal hernia     Gastroparesis       Hiatal hernia      History of pituitary adenoma     s/p resection     Hypothyroidism      Migraines      Mild hyperlipidemia      On tube feeding diet     presence of GJ tube     Pancreatic disease     PD stricture, 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 endometriosis growth     APPENDECTOMY        SECTION       COLONOSCOPY       ENDOSCOPIC INSERTION TUBE GASTROSTOMY N/A 2021    Procedure: Gastrojejonostomy placement with jejunopexy, PEG tube exchange;  Surgeon: Zackery Montoya MD;  [facility]     ENDOSCOPIC RETROGRADE CHOLANGIOPANCREATOGRAM N/A 2015    Procedure: ENDOSCOPIC RETROGRADE CHOLANGIOPANCREATOGRAM;  Surgeon: Mandeep Park MD;  [facility]     ENDOSCOPIC RETROGRADE CHOLANGIOPANCREATOGRAM N/A 2016    Procedure: COMBINED ENDOSCOPIC RETROGRADE CHOLANGIOPANCREATOGRAPHY, PLACE TUBE/STENT;  Surgeon: Mandeep Park MD;  [facility]     ENDOSCOPIC RETROGRADE CHOLANGIOPANCREATOGRAM N/A 3/17/2016    Procedure: COMBINED ENDOSCOPIC RETROGRADE CHOLANGIOPANCREATOGRAPHY, REMOVE FOREIGN BODY OR STENT/TUBE;  Surgeon: Mandeep Park MD;  [facility]     ENDOSCOPIC RETROGRADE CHOLANGIOPANCREATOGRAM N/A 2016    Procedure: COMBINED ENDOSCOPIC RETROGRADE CHOLANGIOPANCREATOGRAPHY, PLACE TUBE/STENT;  Surgeon: Mandeep Park MD;  [facility]     ENDOSCOPIC RETROGRADE CHOLANGIOPANCREATOGRAM N/A 2016    Procedure: COMBINED ENDOSCOPIC RETROGRADE CHOLANGIOPANCREATOGRAPHY, REMOVE FOREIGN BODY OR STENT/TUBE;  Surgeon: Mandeep Park MD;  [facility]     ENDOSCOPIC ULTRASOUND UPPER GASTROINTESTINAL TRACT (GI) N/A 10/3/2016    Procedure: ENDOSCOPIC ULTRASOUND, ESOPHAGOSCOPY /  UPPER GASTROINTESTINAL TRACT (GI);  Surgeon: Guru Jose Rodas MD;  [facility]     ENTEROSCOPY SMALL BOWEL N/A 2/3/2022    Procedure: ENTEROSCOPY with possible fistula closure;  Surgeon: Francisco Dodson MD;  Location:  GI     ESOPHAGOSCOPY, GASTROSCOPY, DUODENOSCOPY (EGD), COMBINED N/A 6/24/2015    Procedure: COMBINED ESOPHAGOSCOPY, GASTROSCOPY, DUODENOSCOPY (EGD), REMOVE FOREIGN BODY;  Surgeon: Mandeep Park MD;  [facility]     ESOPHAGOSCOPY, GASTROSCOPY, DUODENOSCOPY (EGD), COMBINED N/A 10/25/2015    Procedure: COMBINED ESOPHAGOSCOPY, GASTROSCOPY, DUODENOSCOPY (EGD);  Surgeon: Sammy Amaro MD;  [facility]     ESOPHAGOSCOPY, GASTROSCOPY, DUODENOSCOPY (EGD), COMBINED N/A 10/25/2015    Procedure: COMBINED ESOPHAGOSCOPY, GASTROSCOPY, DUODENOSCOPY (EGD), BIOPSY SINGLE OR MULTIPLE;  Surgeon: Sammy Amaro MD;  [facility]     ESOPHAGOSCOPY, GASTROSCOPY, DUODENOSCOPY (EGD), DILATATION, COMBINED       EXCISE LESION TRUNK N/A 4/17/2017    Procedure: EXCISE LESION TRUNK;  Removal of Abdominal Foreign Body;  Surgeon: Nestor Phoenix MD;  [facility]     HC ESOPH/GAS REFLUX TEST W NASAL IMPED >1 HR N/A 11/19/2015    Procedure: ESOPHAGEAL IMPEDENCE FUNCTION TEST WITH 24 HOUR PH GREATER THAN 1 HOUR;  Surgeon: Thiago Apple MD;  [facility]     HC UGI ENDOSCOPY DIAG W BIOPSY  9/17/08     HC UGI ENDOSCOPY DIAG W BIOPSY  9/27/12     HC UGI ENDOSCOPY W ESOPHAGEAL DILATION BALLOON <30MM  9/17/08     HC UGI ENDOSCOPY W EUS N/A 5/5/2015    Procedure: COMBINED ENDOSCOPIC ULTRASOUND, ESOPHAGOSCOPY, GASTROSCOPY, DUODENOSCOPY (EGD);  Surgeon: Wm Dueñas MD;  [facility]     HC WRIST ARTHROSCOP,RELEASE XVERS LIG Bilateral 12/17/08     INJECT TRANSVERSUS ABDOMINIS PLANE (TAP) BLOCK BILATERAL Left 9/22/2016    Procedure: INJECT TRANSVERSUS ABDOMINIS PLANE (TAP) BLOCK BILATERAL;  Surgeon: Dickson Corrigan MD;  [facility]     laparoscopic pineda        LAPAROSCOPIC HERNIORRHAPHY INCISIONAL N/A 2018    Procedure: LAPAROSCOPIC HERNIORRHAPHY INCISIONAL;  Laparoscopic Incisional Hernia Repair with Symbotex Mesh Implant;  Surgeon: Nestor Phoenix MD;  [facility]     LAPAROSCOPIC PANCREATECTOMY, TRANSPLANT AUTO ISLET CELL N/A 2016    Procedure: LAPAROSCOPIC PANCREATECTOMY, TRANSPLANT AUTO ISLET CELL;  Surgeon: Nestor Phoenix MD;  [facility]     REMOVE GASTROSTOMY TUBE ADULT N/A 2022    Procedure: REMOVAL, GASTROSTOMY TUBE, ADULT;  Surgeon: Mandeep Park MD;  [facility]     REPLACE GASTROJEJUNOSTOMY TUBE, PERCUTANEOUS N/A 2021    Procedure: GASTROJEJUNOSTOMY TUBE PLACEMENT, PERCUTANEOUS, WITH GASTROPEXY;  Surgeon: Mandeep Park MD;  [facility]     REPLACE GASTROJEJUNOSTOMY TUBE, PERCUTANEOUS N/A 2021    Procedure: REPLACEMENT, GASTROJEJUNOSTOMY TUBE, PERCUTANEOUS;  Surgeon: Zackery Montoya MD;  [facility]     REPLACE JEJUNOSTOMY TUBE, PERCUTANEOUS N/A 9/10/2021    Procedure: UPPER ENDOSCOPY, REPLACEMENT OF PERCUTANEOUS GASTROJEJUNOSTOMY TUBE, T-TAG GASTROPEXY;  Surgeon: Zackery Montoya MD;  [facility]     REPLACE JEJUNOSTOMY TUBE, PERCUTANEOUS N/A 2021    Procedure: REPLACEMENT, JEJUNOSTOMY TUBE, PERCUTANEOUS;  Surgeon: Mandeep Park MD;  [facility]     transphenoidal pituitary resection       [facility]  DELIVERY ONLY       [facility]  DELIVERY ONLY      repeat c section with incidental cystotomy with repair     [facility] EXCIS PITUITARY,TRANSNASAL/SEPTAL      pituitary tumor removed for diabetes insipidus     [facility] TOTAL ABDOM HYSTERECTOMY      w/ bilateral salpingoophorectomy      As noted above.    PERTINENT MEDICATIONS:  Current Outpatient Medications   Medication     acarbose (PRECOSE) 50 MG tablet     amylase-lipase-protease (CREON 24) 78606-71581 units CPEP per EC capsule     azelastine (ASTELIN) 0.1 % nasal spray     blood glucose  (PAT CONTOUR NEXT) test strip     blood glucose monitoring (PAT MICROLET) lancets     Continuous Blood Gluc Sensor (FREESTYLE LISA 2 SENSOR) MISC     Continuous Blood Gluc Sensor (FREESTYLE LISA 2 SENSOR) MISC     cyclobenzaprine (FLEXERIL) 10 MG tablet     diphenhydrAMINE (BENADRYL ALLERGY) 25 MG capsule     estradiol (VAGIFEM) 10 MCG TABS vaginal tablet     famotidine (PEPCID) 20 MG tablet     Glucagon (GVOKE HYPOPEN 1-PACK) 1 MG/0.2ML SOAJ     glucose 40 % GEL gel     Hydroactive Dressings (TEGADERM HYDROCOLLOID THIN) MISC     HYDROmorphone, STANDARD CONC, (DILAUDID) 1 MG/ML oral solution     ibuprofen (ADVIL/MOTRIN) 400 MG tablet     levothyroxine (SYNTHROID/LEVOTHROID) 137 MCG tablet     Lidocaine (LIDOCARE) 4 % Patch     montelukast (SINGULAIR) 10 MG tablet     Multiple Vitamin (MULTIVITAMIN ADULT PO)     Nutritional Supplements (BOOST HIGH PROTEIN) LIQD     omeprazole (PRILOSEC) 40 MG DR capsule     order for DME     oxyCODONE-acetaminophen (PERCOCET) 5-325 MG tablet     prochlorperazine (COMPAZINE) 10 MG tablet     prochlorperazine (COMPAZINE) 5 MG tablet     promethazine (PHENERGAN) 25 MG tablet     Prucalopride Succinate (MOTEGRITY) 2 MG TABS     scopolamine (TRANSDERM) 1 MG/3DAYS 72 hr patch     senna-docusate (SENOKOT-S;PERICOLACE) 8.6-50 MG per tablet     Sharps Container (BD SHARPS ) MISC     ZOLMitriptan (ZOMIG) 5 MG tablet     amitriptyline (ELAVIL) 50 MG tablet     amylase-lipase-protease (CREON) 55210-76733 units CPEP per EC capsule     cetirizine (ZYRTEC) 10 MG tablet     DULoxetine (CYMBALTA) 30 MG capsule     omeprazole (PRILOSEC) 40 MG DR capsule     polyethylene glycol (MIRALAX) 17 GM/Dose powder     No current facility-administered medications for this visit.     Medications reviewed with patient today, see Medication List/Assessment for details.  No other NSAID/anticoagulation reported by patient.  No other OTC/herbal/supplements reported by patient.     PHYSICAL  EXAMINATION:  No vitals obtained.      PERTINENT STUDIES:  Small bowel enteroscopy 2/3/22  Impression:       - Normal esophagus.                             - Intact gastrostomy with a patent G-tube present                             characterized by healthy appearing mucosa.                             - Normal pylorus. Injected with 200 units of                             botulinum toxin.                             - Widely patent duodenoenterostomy, characterized                             by healthy appearing mucosa was found.                             - Widely patent enteroenterostomy, characterized by                             healthy appearing mucosa was found in the jejunum.                             - Probably prior PEJ site visualized                             endoscopically. No persistent fistula seen.                             Negative leak test. Consitent with clinical                             resolution of leakage.                             - No specimens collected.     ASSESSMENT/PLAN:    Gastroparesis, failed intrapyloric botox injection  Intractible nausea  S/p TPIAT   Ongoing fatigue  Overall trying course, relatively stable. She still has good and bad days. Since last visit she had 3 good days which is considered good for her. IVF hydration has helped with her symptoms. Phenergan has helped. Still vents her G-tube 4 days in a week which sometimes doesn't help and she still develops intractible nausea. Recently had COVID with subsequent ongoing fatigue.  - awaiting for Domperidone   - will need EKG and drug interactions evaluation prior to start domperidone  - discuss PORT placement w primary  - Change PEG tube, could be done in [place]  - Consider establishing PCP in [place] where she lives   - Follow-up w us pending IND for DOMPERIDONE, which should be approved within next month or two  Follow up TBD.     Patient seen and discussed with attending GI physician, Dr. Park.      Cassidy Maradiaga MD  Internal Medicine, PGY-2     You were on a call for 43 minutes 18 seconds  Total time spent 75 minutes including record review.    Seen and examined with GI fellow, agree with findings and recommendations.      Mandeep Park MD GI Staff

## 2022-05-09 NOTE — LETTER
5/9/2022         RE: Dinora Mcghee  [address]  [place] [address]      Dinora is a 57 year old who is being evaluated via a billable video visit.      How would you like to obtain your AVS? MyChart  If the video visit is dropped, the invitation should be resent by: Text to cell phone: [phone]  Will anyone else be joining your video visit? No      GI CLINIC VISIT 5/9/2022    Reason for this visit: follow up     HPI: 57 year old female with a PMHx significant for TPIAT with course complicated by gastroparesis who failed intrapyloric botox injection with ongoing sever exacerbation. She's not a candidate for G-POEM given that would be unlikely beneficial. S/p venting G-tube. She was last seen by Dr. Park 3/30/22. Today, she presents for follow up.     She had 3 good days which is considered good for her. We increase hydration to Monday-Wednesday- Friday which she reports has been helping but the difficulty is to find a vein for her -- she's wondering if she can get a port. Phenergan also helps alleviate her symptoms for a whole day period. She's been venting G-tube 4 days a week which sometimes doesn't help and causes her to throw up for 12 hours straight. She's again some weight since the last visit. She also got COVID recently after which she's been having residual constant fatigue. She has to hire someone to help her do the work which has been limited d/t health issues.     IND for domperidone was done which will come in a month.     ROS: 10pt ROS performed and otherwise negative.    PERTINENT PAST MEDICAL/SURGICAL HISTORY:  Past Medical History:   Diagnosis Date     Allergic rhinitis, cause unspecified      Allergy to other foods     strawberries, apples, celeries, alice, watermelon     Arthritis     left knee     Choledocholithiasis     long after cholecystectomy     Chronic abdominal pain      Chronic constipation      Chronic nausea      Chronic pancreatitis (H)      Degeneration of lumbar or  lumbosacral intervertebral disc      Esophageal reflux     w/ hiatal hernia     Gastroparesis      Hiatal hernia      History of pituitary adenoma     s/p resection     Hypothyroidism      Migraines      Mild hyperlipidemia      On tube feeding diet     presence of GJ tube     Pancreatic disease     PD stricture, 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 endometriosis growth     APPENDECTOMY        SECTION       COLONOSCOPY       ENDOSCOPIC INSERTION TUBE GASTROSTOMY N/A 2021    Procedure: Gastrojejonostomy placement with jejunopexy, PEG tube exchange;  Surgeon: Zackery Montoya MD;  [facility]     ENDOSCOPIC RETROGRADE CHOLANGIOPANCREATOGRAM N/A 2015    Procedure: ENDOSCOPIC RETROGRADE CHOLANGIOPANCREATOGRAM;  Surgeon: Mandeep Park MD;  [facility]     ENDOSCOPIC RETROGRADE CHOLANGIOPANCREATOGRAM N/A 2016    Procedure: COMBINED ENDOSCOPIC RETROGRADE CHOLANGIOPANCREATOGRAPHY, PLACE TUBE/STENT;  Surgeon: Mandeep Park MD;  [facility]     ENDOSCOPIC RETROGRADE CHOLANGIOPANCREATOGRAM N/A 3/17/2016    Procedure: COMBINED ENDOSCOPIC RETROGRADE CHOLANGIOPANCREATOGRAPHY, REMOVE FOREIGN BODY OR STENT/TUBE;  Surgeon: Mandeep Park MD;  [facility]     ENDOSCOPIC RETROGRADE CHOLANGIOPANCREATOGRAM N/A 2016    Procedure: COMBINED ENDOSCOPIC RETROGRADE CHOLANGIOPANCREATOGRAPHY, PLACE TUBE/STENT;  Surgeon: Mandeep Park MD;  [facility]     ENDOSCOPIC RETROGRADE CHOLANGIOPANCREATOGRAM N/A 2016    Procedure: COMBINED ENDOSCOPIC RETROGRADE CHOLANGIOPANCREATOGRAPHY, REMOVE FOREIGN BODY OR STENT/TUBE;  Surgeon: Mandeep Park MD;  [facility]     ENDOSCOPIC ULTRASOUND UPPER  GASTROINTESTINAL TRACT (GI) N/A 10/3/2016    Procedure: ENDOSCOPIC ULTRASOUND, ESOPHAGOSCOPY / UPPER GASTROINTESTINAL TRACT (GI);  Surgeon: Guru Jose Rodas MD;  [facility]     ENTEROSCOPY SMALL BOWEL N/A 2/3/2022    Procedure: ENTEROSCOPY with possible fistula closure;  Surgeon: Francisco Dodson MD;  Location:  GI     ESOPHAGOSCOPY, GASTROSCOPY, DUODENOSCOPY (EGD), COMBINED N/A 6/24/2015    Procedure: COMBINED ESOPHAGOSCOPY, GASTROSCOPY, DUODENOSCOPY (EGD), REMOVE FOREIGN BODY;  Surgeon: Mandeep Park MD;  [facility]     ESOPHAGOSCOPY, GASTROSCOPY, DUODENOSCOPY (EGD), COMBINED N/A 10/25/2015    Procedure: COMBINED ESOPHAGOSCOPY, GASTROSCOPY, DUODENOSCOPY (EGD);  Surgeon: Sammy Amaro MD;  [facility]     ESOPHAGOSCOPY, GASTROSCOPY, DUODENOSCOPY (EGD), COMBINED N/A 10/25/2015    Procedure: COMBINED ESOPHAGOSCOPY, GASTROSCOPY, DUODENOSCOPY (EGD), BIOPSY SINGLE OR MULTIPLE;  Surgeon: Sammy Amaro MD;  [facility]     ESOPHAGOSCOPY, GASTROSCOPY, DUODENOSCOPY (EGD), DILATATION, COMBINED       EXCISE LESION TRUNK N/A 4/17/2017    Procedure: EXCISE LESION TRUNK;  Removal of Abdominal Foreign Body;  Surgeon: Nestor Phoenix MD;  [facility]     HC ESOPH/GAS REFLUX TEST W NASAL IMPED >1 HR N/A 11/19/2015    Procedure: ESOPHAGEAL IMPEDENCE FUNCTION TEST WITH 24 HOUR PH GREATER THAN 1 HOUR;  Surgeon: Thiago Apple MD;  [facility]     HC UGI ENDOSCOPY DIAG W BIOPSY  9/17/08     HC UGI ENDOSCOPY DIAG W BIOPSY  9/27/12     HC UGI ENDOSCOPY W ESOPHAGEAL DILATION BALLOON <30MM  9/17/08     HC UGI ENDOSCOPY W EUS N/A 5/5/2015    Procedure: COMBINED ENDOSCOPIC ULTRASOUND, ESOPHAGOSCOPY, GASTROSCOPY, DUODENOSCOPY (EGD);  Surgeon: Wm Dueñas MD;  [facility]     HC WRIST ARTHROSCOP,RELEASE XVERS LIG Bilateral 12/17/08     INJECT TRANSVERSUS ABDOMINIS PLANE (TAP) BLOCK BILATERAL Left 9/22/2016    Procedure: INJECT TRANSVERSUS ABDOMINIS PLANE  (TAP) BLOCK BILATERAL;  Surgeon: Dickson Corrigan MD;  [facility]     laparoscopic pineda       LAPAROSCOPIC HERNIORRHAPHY INCISIONAL N/A 2018    Procedure: LAPAROSCOPIC HERNIORRHAPHY INCISIONAL;  Laparoscopic Incisional Hernia Repair with Symbotex Mesh Implant;  Surgeon: Nestor Phoenix MD;  [facility]     LAPAROSCOPIC PANCREATECTOMY, TRANSPLANT AUTO ISLET CELL N/A 2016    Procedure: LAPAROSCOPIC PANCREATECTOMY, TRANSPLANT AUTO ISLET CELL;  Surgeon: Nestor Phoenix MD;  [facility]     REMOVE GASTROSTOMY TUBE ADULT N/A 2022    Procedure: REMOVAL, GASTROSTOMY TUBE, ADULT;  Surgeon: Mandeep Park MD;  [facility]     REPLACE GASTROJEJUNOSTOMY TUBE, PERCUTANEOUS N/A 2021    Procedure: GASTROJEJUNOSTOMY TUBE PLACEMENT, PERCUTANEOUS, WITH GASTROPEXY;  Surgeon: Mandeep Park MD;  [facility]     REPLACE GASTROJEJUNOSTOMY TUBE, PERCUTANEOUS N/A 2021    Procedure: REPLACEMENT, GASTROJEJUNOSTOMY TUBE, PERCUTANEOUS;  Surgeon: Zackery Montoya MD;  [facility]     REPLACE JEJUNOSTOMY TUBE, PERCUTANEOUS N/A 9/10/2021    Procedure: UPPER ENDOSCOPY, REPLACEMENT OF PERCUTANEOUS GASTROJEJUNOSTOMY TUBE, T-TAG GASTROPEXY;  Surgeon: Zackery Montoya MD;  [facility]     REPLACE JEJUNOSTOMY TUBE, PERCUTANEOUS N/A 2021    Procedure: REPLACEMENT, JEJUNOSTOMY TUBE, PERCUTANEOUS;  Surgeon: Mandeep Park MD;  [facility]     transphenoidal pituitary resection       [facility]  DELIVERY ONLY       [facility]  DELIVERY ONLY      repeat c section with incidental cystotomy with repair     [facility] EXCIS PITUITARY,TRANSNASAL/SEPTAL      pituitary tumor removed for diabetes insipidus     [facility] TOTAL ABDOM HYSTERECTOMY      w/ bilateral salpingoophorectomy      As noted above.    PERTINENT MEDICATIONS:  Current Outpatient Medications   Medication     acarbose (PRECOSE) 50 MG tablet     amylase-lipase-protease (CREON 24)  67263-62807 units CPEP per EC capsule     azelastine (ASTELIN) 0.1 % nasal spray     blood glucose (PAT CONTOUR NEXT) test strip     blood glucose monitoring (PAT MICROLET) lancets     Continuous Blood Gluc Sensor (FREESTYLE LISA 2 SENSOR) MISC     Continuous Blood Gluc Sensor (FREESTYLE LISA 2 SENSOR) MISC     cyclobenzaprine (FLEXERIL) 10 MG tablet     diphenhydrAMINE (BENADRYL ALLERGY) 25 MG capsule     estradiol (VAGIFEM) 10 MCG TABS vaginal tablet     famotidine (PEPCID) 20 MG tablet     Glucagon (GVOKE HYPOPEN 1-PACK) 1 MG/0.2ML SOAJ     glucose 40 % GEL gel     Hydroactive Dressings (TEGADERM HYDROCOLLOID THIN) MISC     HYDROmorphone, STANDARD CONC, (DILAUDID) 1 MG/ML oral solution     ibuprofen (ADVIL/MOTRIN) 400 MG tablet     levothyroxine (SYNTHROID/LEVOTHROID) 137 MCG tablet     Lidocaine (LIDOCARE) 4 % Patch     montelukast (SINGULAIR) 10 MG tablet     Multiple Vitamin (MULTIVITAMIN ADULT PO)     Nutritional Supplements (BOOST HIGH PROTEIN) LIQD     omeprazole (PRILOSEC) 40 MG DR capsule     order for DME     oxyCODONE-acetaminophen (PERCOCET) 5-325 MG tablet     prochlorperazine (COMPAZINE) 10 MG tablet     prochlorperazine (COMPAZINE) 5 MG tablet     promethazine (PHENERGAN) 25 MG tablet     Prucalopride Succinate (MOTEGRITY) 2 MG TABS     scopolamine (TRANSDERM) 1 MG/3DAYS 72 hr patch     senna-docusate (SENOKOT-S;PERICOLACE) 8.6-50 MG per tablet     Sharps Container (BD SHARPS ) MISC     ZOLMitriptan (ZOMIG) 5 MG tablet     amitriptyline (ELAVIL) 50 MG tablet     amylase-lipase-protease (CREON) 85401-77362 units CPEP per EC capsule     cetirizine (ZYRTEC) 10 MG tablet     DULoxetine (CYMBALTA) 30 MG capsule     omeprazole (PRILOSEC) 40 MG DR capsule     polyethylene glycol (MIRALAX) 17 GM/Dose powder     No current facility-administered medications for this visit.     Medications reviewed with patient today, see Medication List/Assessment for details.  No other NSAID/anticoagulation  reported by patient.  No other OTC/herbal/supplements reported by patient.     PHYSICAL EXAMINATION:  No vitals obtained.      PERTINENT STUDIES:  Small bowel enteroscopy 2/3/22  Impression:       - Normal esophagus.                             - Intact gastrostomy with a patent G-tube present                             characterized by healthy appearing mucosa.                             - Normal pylorus. Injected with 200 units of                             botulinum toxin.                             - Widely patent duodenoenterostomy, characterized                             by healthy appearing mucosa was found.                             - Widely patent enteroenterostomy, characterized by                             healthy appearing mucosa was found in the jejunum.                             - Probably prior PEJ site visualized                             endoscopically. No persistent fistula seen.                             Negative leak test. Consitent with clinical                             resolution of leakage.                             - No specimens collected.     ASSESSMENT/PLAN:    Gastroparesis, failed intrapyloric botox injection  Intractible nausea  S/p TPIAT   Ongoing fatigue  Overall trying course, relatively stable. She still has good and bad days. Since last visit she had 3 good days which is considered good for her. IVF hydration has helped with her symptoms. Phenergan has helped. Still vents her G-tube 4 days in a week which sometimes doesn't help and she still develops intractible nausea. Recently had COVID with subsequent ongoing fatigue.  - awaiting for Domperidone   - will need EKG and drug interactions evaluation prior to start domperidone  - discuss PORT placement w primary  - Change PEG tube, could be done in [place]  - Consider establishing PCP in [place] where she lives   - Follow-up w us pending IND for DOMPERIDONE, which should be approved within next month or  two  Follow up TBD.     Patient seen and discussed with attending GI physician, Dr. Park.     Cassidy Maradiaga MD  Internal Medicine, PGY-2     You were on a call for 43 minutes 18 seconds  Total time spent 75 minutes including record review.    Seen and examined with GI fellow, agree with findings and recommendations.    Mandeep Park MD GI Staff            Mandeep Park MD

## 2022-05-09 NOTE — PROGRESS NOTES
This is a recent snapshot of the patient's [facility] medical record.  For current drug dose and complete information and questions, call [phone] or In Basket pool, [facility] ([id number])  CSN Number:  [id number]

## 2022-05-09 NOTE — PROGRESS NOTES
Dinora is a 57 year old who is being evaluated via a billable video visit.      How would you like to obtain your AVS? MyChart  If the video visit is dropped, the invitation should be resent by: Text to cell phone: [phone]  Will anyone else be joining your video visit? No

## 2022-05-09 NOTE — PROGRESS NOTES
GI CLINIC VISIT 5/9/2022    Reason for this visit: follow up     HPI: 57 year old female with a PMHx significant for TPIAT with course complicated by gastroparesis who failed intrapyloric botox injection with ongoing sever exacerbation. She's not a candidate for G-POEM given that would be unlikely beneficial. S/p venting G-tube. She was last seen by Dr. Park 3/30/22. Today, she presents for follow up.     She had 3 good days which is considered good for her. We increase hydration to Monday-Wednesday- Friday which she reports has been helping but the difficulty is to find a vein for her -- she's wondering if she can get a port. Phenergan also helps alleviate her symptoms for a whole day period. She's been venting G-tube 4 days a week which sometimes doesn't help and causes her to throw up for 12 hours straight. She's again some weight since the last visit. She also got COVID recently after which she's been having residual constant fatigue. She has to hire someone to help her do the work which has been limited d/t health issues.     IND for domperidone was done which will come in a month.     ROS: 10pt ROS performed and otherwise negative.    PERTINENT PAST MEDICAL/SURGICAL HISTORY:  Past Medical History:   Diagnosis Date     Allergic rhinitis, cause unspecified      Allergy to other foods     strawberries, apples, celeries, alice, watermelon     Arthritis     left knee     Choledocholithiasis     long after cholecystectomy     Chronic abdominal pain      Chronic constipation      Chronic nausea      Chronic pancreatitis (H)      Degeneration of lumbar or lumbosacral intervertebral disc      Esophageal reflux     w/ hiatal hernia     Gastroparesis      Hiatal hernia      History of pituitary adenoma     s/p resection     Hypothyroidism      Migraines      Mild hyperlipidemia      On tube feeding diet     presence of GJ tube     Pancreatic disease     PD stricture, 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 endometriosis growth     APPENDECTOMY        SECTION       COLONOSCOPY       ENDOSCOPIC INSERTION TUBE GASTROSTOMY N/A 2021    Procedure: Gastrojejonostomy placement with jejunopexy, PEG tube exchange;  Surgeon: Zackery Montoya MD;  [facility]     ENDOSCOPIC RETROGRADE CHOLANGIOPANCREATOGRAM N/A 2015    Procedure: ENDOSCOPIC RETROGRADE CHOLANGIOPANCREATOGRAM;  Surgeon: Mandeep Park MD;  [facility]     ENDOSCOPIC RETROGRADE CHOLANGIOPANCREATOGRAM N/A 2016    Procedure: COMBINED ENDOSCOPIC RETROGRADE CHOLANGIOPANCREATOGRAPHY, PLACE TUBE/STENT;  Surgeon: Mandeep Park MD;  [facility]     ENDOSCOPIC RETROGRADE CHOLANGIOPANCREATOGRAM N/A 3/17/2016    Procedure: COMBINED ENDOSCOPIC RETROGRADE CHOLANGIOPANCREATOGRAPHY, REMOVE FOREIGN BODY OR STENT/TUBE;  Surgeon: Mandeep Park MD;  [facility]     ENDOSCOPIC RETROGRADE CHOLANGIOPANCREATOGRAM N/A 2016    Procedure: COMBINED ENDOSCOPIC RETROGRADE CHOLANGIOPANCREATOGRAPHY, PLACE TUBE/STENT;  Surgeon: Mandeep Park MD;  [facility]     ENDOSCOPIC RETROGRADE CHOLANGIOPANCREATOGRAM N/A 2016    Procedure: COMBINED ENDOSCOPIC RETROGRADE CHOLANGIOPANCREATOGRAPHY, REMOVE FOREIGN BODY OR STENT/TUBE;  Surgeon: Mandeep Park MD;  [facility]     ENDOSCOPIC ULTRASOUND UPPER GASTROINTESTINAL TRACT (GI) N/A 10/3/2016    Procedure: ENDOSCOPIC ULTRASOUND, ESOPHAGOSCOPY / UPPER GASTROINTESTINAL TRACT (GI);  Surgeon: Guru Jose Rodas MD;  [facility]     ENTEROSCOPY SMALL BOWEL N/A 2/3/2022    Procedure: ENTEROSCOPY with possible fistula closure;  Surgeon: Francisco Dodson MD;  [facility]     ESOPHAGOSCOPY,  GASTROSCOPY, DUODENOSCOPY (EGD), COMBINED N/A 6/24/2015    Procedure: COMBINED ESOPHAGOSCOPY, GASTROSCOPY, DUODENOSCOPY (EGD), REMOVE FOREIGN BODY;  Surgeon: Mandeep Park MD;  [facility]     ESOPHAGOSCOPY, GASTROSCOPY, DUODENOSCOPY (EGD), COMBINED N/A 10/25/2015    Procedure: COMBINED ESOPHAGOSCOPY, GASTROSCOPY, DUODENOSCOPY (EGD);  Surgeon: Sammy Amaro MD;  [facility]     ESOPHAGOSCOPY, GASTROSCOPY, DUODENOSCOPY (EGD), COMBINED N/A 10/25/2015    Procedure: COMBINED ESOPHAGOSCOPY, GASTROSCOPY, DUODENOSCOPY (EGD), BIOPSY SINGLE OR MULTIPLE;  Surgeon: Sammy Amaro MD;  [facility]     ESOPHAGOSCOPY, GASTROSCOPY, DUODENOSCOPY (EGD), DILATATION, COMBINED       EXCISE LESION TRUNK N/A 4/17/2017    Procedure: EXCISE LESION TRUNK;  Removal of Abdominal Foreign Body;  Surgeon: Nestor Phoenix MD;  [facility]     HC ESOPH/GAS REFLUX TEST W NASAL IMPED >1 HR N/A 11/19/2015    Procedure: ESOPHAGEAL IMPEDENCE FUNCTION TEST WITH 24 HOUR PH GREATER THAN 1 HOUR;  Surgeon: Thiago Apple MD;  [facility]     HC UGI ENDOSCOPY DIAG W BIOPSY  9/17/08     HC UGI ENDOSCOPY DIAG W BIOPSY  9/27/12     HC UGI ENDOSCOPY W ESOPHAGEAL DILATION BALLOON <30MM  9/17/08     HC UGI ENDOSCOPY W EUS N/A 5/5/2015    Procedure: COMBINED ENDOSCOPIC ULTRASOUND, ESOPHAGOSCOPY, GASTROSCOPY, DUODENOSCOPY (EGD);  Surgeon: Wm Dueñas MD;  [facility]     HC WRIST ARTHROSCOP,RELEASE XVERS LIG Bilateral 12/17/08     INJECT TRANSVERSUS ABDOMINIS PLANE (TAP) BLOCK BILATERAL Left 9/22/2016    Procedure: INJECT TRANSVERSUS ABDOMINIS PLANE (TAP) BLOCK BILATERAL;  Surgeon: Dickson Corrigan MD;  [facility]     laparoscopic pineda  1995     LAPAROSCOPIC HERNIORRHAPHY INCISIONAL N/A 8/23/2018    Procedure: LAPAROSCOPIC HERNIORRHAPHY INCISIONAL;  Laparoscopic Incisional Hernia Repair with Symbotex Mesh Implant;  Surgeon: Nestor Phoenix MD;  [facility]     LAPAROSCOPIC PANCREATECTOMY,  TRANSPLANT AUTO ISLET CELL N/A 2016    Procedure: LAPAROSCOPIC PANCREATECTOMY, TRANSPLANT AUTO ISLET CELL;  Surgeon: Nestor Phoenix MD;  [facility]     REMOVE GASTROSTOMY TUBE ADULT N/A 2022    Procedure: REMOVAL, GASTROSTOMY TUBE, ADULT;  Surgeon: Mandeep Park MD;  [facility]     REPLACE GASTROJEJUNOSTOMY TUBE, PERCUTANEOUS N/A 2021    Procedure: GASTROJEJUNOSTOMY TUBE PLACEMENT, PERCUTANEOUS, WITH GASTROPEXY;  Surgeon: Mandeep Park MD;  [facility]     REPLACE GASTROJEJUNOSTOMY TUBE, PERCUTANEOUS N/A 2021    Procedure: REPLACEMENT, GASTROJEJUNOSTOMY TUBE, PERCUTANEOUS;  Surgeon: Zackery Montoya MD;  [facility]     REPLACE JEJUNOSTOMY TUBE, PERCUTANEOUS N/A 9/10/2021    Procedure: UPPER ENDOSCOPY, REPLACEMENT OF PERCUTANEOUS GASTROJEJUNOSTOMY TUBE, T-TAG GASTROPEXY;  Surgeon: Zackery Montoya MD;  [facility]     REPLACE JEJUNOSTOMY TUBE, PERCUTANEOUS N/A 2021    Procedure: REPLACEMENT, JEJUNOSTOMY TUBE, PERCUTANEOUS;  Surgeon: Mandeep Park MD;  [facility]     transphenoidal pituitary resection       Z  DELIVERY ONLY       Z  DELIVERY ONLY      repeat c section with incidental cystotomy with repair     Z EXCIS PITUITARY,TRANSNASAL/SEPTAL      pituitary tumor removed for diabetes insipidus     Z TOTAL ABDOM HYSTERECTOMY      w/ bilateral salpingoophorectomy      As noted above.    PERTINENT MEDICATIONS:  Current Outpatient Medications   Medication     acarbose (PRECOSE) 50 MG tablet     amylase-lipase-protease (CREON 24) 62401-94624 units CPEP per EC capsule     azelastine (ASTELIN) 0.1 % nasal spray     blood glucose (PAT CONTOUR NEXT) test strip     blood glucose monitoring (PAT MICROLET) lancets     Continuous Blood Gluc Sensor (FREESTYLE LISA 2 SENSOR) MISC     Continuous Blood Gluc Sensor (FREESTYLE LISA 2 SENSOR) MISC     cyclobenzaprine (FLEXERIL) 10 MG tablet      diphenhydrAMINE (BENADRYL ALLERGY) 25 MG capsule     estradiol (VAGIFEM) 10 MCG TABS vaginal tablet     famotidine (PEPCID) 20 MG tablet     Glucagon (GVOKE HYPOPEN 1-PACK) 1 MG/0.2ML SOAJ     glucose 40 % GEL gel     Hydroactive Dressings (TEGADERM HYDROCOLLOID THIN) MISC     HYDROmorphone, STANDARD CONC, (DILAUDID) 1 MG/ML oral solution     ibuprofen (ADVIL/MOTRIN) 400 MG tablet     levothyroxine (SYNTHROID/LEVOTHROID) 137 MCG tablet     Lidocaine (LIDOCARE) 4 % Patch     montelukast (SINGULAIR) 10 MG tablet     Multiple Vitamin (MULTIVITAMIN ADULT PO)     Nutritional Supplements (BOOST HIGH PROTEIN) LIQD     omeprazole (PRILOSEC) 40 MG DR capsule     order for DME     oxyCODONE-acetaminophen (PERCOCET) 5-325 MG tablet     prochlorperazine (COMPAZINE) 10 MG tablet     prochlorperazine (COMPAZINE) 5 MG tablet     promethazine (PHENERGAN) 25 MG tablet     Prucalopride Succinate (MOTEGRITY) 2 MG TABS     scopolamine (TRANSDERM) 1 MG/3DAYS 72 hr patch     senna-docusate (SENOKOT-S;PERICOLACE) 8.6-50 MG per tablet     Sharps Container (BD SHARPS ) MISC     ZOLMitriptan (ZOMIG) 5 MG tablet     amitriptyline (ELAVIL) 50 MG tablet     amylase-lipase-protease (CREON) 93462-65992 units CPEP per EC capsule     cetirizine (ZYRTEC) 10 MG tablet     DULoxetine (CYMBALTA) 30 MG capsule     omeprazole (PRILOSEC) 40 MG DR capsule     polyethylene glycol (MIRALAX) 17 GM/Dose powder     No current facility-administered medications for this visit.     Medications reviewed with patient today, see Medication List/Assessment for details.  No other NSAID/anticoagulation reported by patient.  No other OTC/herbal/supplements reported by patient.     PHYSICAL EXAMINATION:  No vitals obtained.      PERTINENT STUDIES:  Small bowel enteroscopy 2/3/22  Impression:       - Normal esophagus.                             - Intact gastrostomy with a patent G-tube present                             characterized by healthy appearing mucosa.                              - Normal pylorus. Injected with 200 units of                             botulinum toxin.                             - Widely patent duodenoenterostomy, characterized                             by healthy appearing mucosa was found.                             - Widely patent enteroenterostomy, characterized by                             healthy appearing mucosa was found in the jejunum.                             - Probably prior PEJ site visualized                             endoscopically. No persistent fistula seen.                             Negative leak test. Consitent with clinical                             resolution of leakage.                             - No specimens collected.     ASSESSMENT/PLAN:    Gastroparesis, failed intrapyloric botox injection  Intractible nausea  S/p TPIAT   Ongoing fatigue  Overall trying course, relatively stable. She still has good and bad days. Since last visit she had 3 good days which is considered good for her. IVF hydration has helped with her symptoms. Phenergan has helped. Still vents her G-tube 4 days in a week which sometimes doesn't help and she still develops intractible nausea. Recently had COVID with subsequent ongoing fatigue.  - awaiting for Domperidone   - will need EKG and drug interactions evaluation prior to start domperidone  - discuss PORT placement w primary  - Change PEG tube, could be done in [place]  - Consider establishing PCP in [place] where she lives   - Follow-up w us pending IND for DOMPERIDONE, which should be approved within next month or two  Follow up TBD.     Patient seen and discussed with attending GI physician, Dr. Park.     Cassidy Maradiaga MD  Internal Medicine, PGY-2     You were on a call for 43 minutes 18 seconds  Total time spent 75 minutes including record review.    Seen and examined with GI fellow, agree with findings and recommendations.    Mandeep Park MD GI Staff

## 2022-05-12 ENCOUNTER — TRANSFERRED RECORDS (OUTPATIENT)
Dept: HEALTH INFORMATION MANAGEMENT | Facility: CLINIC | Age: 57
End: 2022-05-12

## 2022-05-12 LAB
ALT SERPL-CCNC: 31 U/L (ref 7–45)
AST SERPL-CCNC: 23 U/L (ref 8–43)
CREATININE (EXTERNAL): 0.73 MG/DL (ref 0.59–1.04)
GFR ESTIMATED (EXTERNAL): >90 ML/MIN/BSA
GFR ESTIMATED (IF AFRICAN AMERICAN) (EXTERNAL): >90 ML/MIN/BSA
GLUCOSE (EXTERNAL): 103 MG/DL (ref 70–140)
POTASSIUM (EXTERNAL): 4.1 MMOL/L (ref 3.6–5.2)
TSH SERPL-ACNC: 1.4 MIU/L (ref 0.3–4.2)

## 2022-05-18 ENCOUNTER — PATIENT OUTREACH (OUTPATIENT)
Dept: GASTROENTEROLOGY | Facility: CLINIC | Age: 57
End: 2022-05-18
Payer: COMMERCIAL

## 2022-05-18 DIAGNOSIS — Z90.410 HISTORY OF PANCREATECTOMY: ICD-10-CM

## 2022-05-18 DIAGNOSIS — Z94.83 STATUS POST PANCREAS TRANSPLANTATION (H): ICD-10-CM

## 2022-05-18 DIAGNOSIS — K31.84 GASTROPARESIS: Primary | ICD-10-CM

## 2022-05-18 DIAGNOSIS — R10.13 EPIGASTRIC PAIN: ICD-10-CM

## 2022-05-20 ENCOUNTER — MYC MEDICAL ADVICE (OUTPATIENT)
Dept: GASTROENTEROLOGY | Facility: CLINIC | Age: 57
End: 2022-05-20
Payer: COMMERCIAL

## 2022-05-20 NOTE — PROGRESS NOTES
Outpatient IR Referral    Patient is a 58 y/o female with a PMH of former tobacco use, lumbago, migraines, chronic abdominal pain, GERD, hypothyroidism, choledocholithiasis, ERCP, G tube, s/p pancreatectomy with [employer] s/b gastroparesis, failed intra pyloric Botox injection, considering gastric peroral endoscopic myotomy. Patient is receiving LR and phenergan hydration infusions three times a week PRN at [facility] in [place], [place].  IR has been asked to place a port due to poor venous access.     IR has asked referring team to clarify who will be managing the port. I spoke with patient who stated that she had a R sided chest port in late [id number] placed at [facility], MD is no longer there to placed new port, she had no difficulties with her previous port  She states she has not had any chest, neck surgeries, PICC or other tunneled central venous access.  The phenergan infusions are becoming more difficult due lack of venous access.     I have sent an epic message to referring team to verify [facility] will manage her port.     Dr. Park responded that his team can arrange [facility] to manage the port.     No obvious contraindications for port placement. Chest CT 6/4/15 imaging in [facility].     Procedure order placed.    Primary team Dr. Park made aware of IR recommendations via epic messaging.     NATALY Hanna CNP  Interventional Radiology   IR on-call pager: [phone]

## 2022-05-23 ENCOUNTER — TELEPHONE (OUTPATIENT)
Dept: GASTROENTEROLOGY | Facility: CLINIC | Age: 57
End: 2022-05-23
Payer: COMMERCIAL

## 2022-05-23 ENCOUNTER — PATIENT OUTREACH (OUTPATIENT)
Dept: GASTROENTEROLOGY | Facility: CLINIC | Age: 57
End: 2022-05-23
Payer: COMMERCIAL

## 2022-05-23 DIAGNOSIS — K59.00 CONSTIPATION, UNSPECIFIED CONSTIPATION TYPE: ICD-10-CM

## 2022-05-23 DIAGNOSIS — K59.01 SLOW TRANSIT CONSTIPATION: ICD-10-CM

## 2022-05-23 DIAGNOSIS — K59.04 CHRONIC IDIOPATHIC CONSTIPATION: ICD-10-CM

## 2022-05-23 DIAGNOSIS — K58.9 IRRITABLE BOWEL SYNDROME, UNSPECIFIED TYPE: ICD-10-CM

## 2022-05-23 DIAGNOSIS — K31.84 GASTROPARESIS: Primary | ICD-10-CM

## 2022-05-23 NOTE — PROGRESS NOTES
Received a My Chart from Dinora and a PA needs to be completed for the Motegrity.   Call placed to Dinora and she needs refills too.  Order placed for the motegrity and PA to be completed.

## 2022-05-23 NOTE — TELEPHONE ENCOUNTER
Central Prior Authorization Team   Phone: [phone]      PA Initiation    Medication: Prucalopride Succinate (MOTEGRITY) 2 MG TABS-PA initiated  Insurance Company: [employer] [place] - Phone [phone] Fax [phone]  Pharmacy Filling the Rx: [id number] - [place], [place] - [address]  AT [facility] [address]  Filling Pharmacy Phone:    Filling Pharmacy Fax:    Start Date: 5/23/2022

## 2022-05-24 ENCOUNTER — VIRTUAL VISIT (OUTPATIENT)
Dept: PHARMACY | Facility: CLINIC | Age: 57
End: 2022-05-24
Payer: COMMERCIAL

## 2022-05-24 ENCOUNTER — PATIENT OUTREACH (OUTPATIENT)
Dept: GASTROENTEROLOGY | Facility: CLINIC | Age: 57
End: 2022-05-24
Payer: COMMERCIAL

## 2022-05-24 DIAGNOSIS — K31.84 GASTROPARESIS: Primary | ICD-10-CM

## 2022-05-24 PROCEDURE — 99207 PR NO CHARGE LOS: CPT | Performed by: PHARMACIST

## 2022-05-24 NOTE — PROGRESS NOTES
Clinical Pharmacy Consult:                                                    Dinora Mcghee is a 57 year old female called for a clinical pharmacist consult.  She was referred to me from self - reached out via [employer].     Patient declined CMR today - preference to review medication interactions only.    Reason for Consult: patient requested review for domperidone interactions given potential start soon    Discussion:     She was wondering about zolmitriptan and domperidone. Reviewed in two databases and did not see any major drug-drug interactions between these two agents. She did taper the amitriptyline off over a several of weeks. Notes she goes three times weekly for fluids/phenergan. She notes this is very helpful for the first night. Does have a G-tube in place. She will feel bloated after eating and vent this. Notes she will not have a bowel movement and then everything will have several bowel movements. Reviewed medications as listed in Epic and ran against domperidone which yielded the following interactions. Discussed in depth today.    Interactions:    -- anticholinergics (cyclobenazprine, promethazine, prochlorperazine, cetirizine, diphenhydramine, scopolamine)    - Potential to increase anticholinergic toxicities/overlapping side effects.   -- opioids: notes she is only using hydromorphone a couple times per month. She does not have Percocet currently.      - Since opioids can slow gut transit, this may counteract some of the pro-kinetic action of domperidone.    She is wondering about getting more phenergan at a time as she notes consistent barriers to getting this filled on time. Will discuss with her GI care team. She notes this used to be filled by Rosanne Marti PA-C, but it is now under Dr. Park.    Plan:  1. Hattie to follow-up on phenergan fills - can the next order contain refills? (staff message sent)  2. See above re: medication interactions

## 2022-05-24 NOTE — TELEPHONE ENCOUNTER
Prior Authorization Approval    Authorization Effective Date: 6/9/2022  Authorization Expiration Date: 6/9/2023  Medication: Prucalopride Succinate (MOTEGRITY) 2 MG TABS-PA approved  Approved Dose/Quantity:   Reference #: [id number]   Insurance Company: [facility] - Phone [phone] Fax [phone]  Expected CoPay:       CoPay Card Available:      Foundation Assistance Needed:    Which Pharmacy is filling the prescription (Not needed for infusion/clinic administered): [id number] - [place], [place] - [address] AT [facility] [address]  Pharmacy Notified: Yes  Patient Notified: No

## 2022-05-25 NOTE — PATIENT INSTRUCTIONS
It was nice speaking with you today.    Here are the interactions we discussed.    Interactions:    -- anticholinergics (cyclobenazprine, promethazine, prochlorperazine, cetirizine, diphenhydramine, scopolamine)    - Potential to increase anticholinergic toxicities/overlapping side effects with domperidone. Examples of anticholinergic side effects include dry eye, dry mouth, constipation, confusion, etc.   -- opioids: notes she is only using hydromorphone a couple times per month. She does not have Percocet currently.      - Since opioids can slow gut transit, this may counteract some of the pro-kinetic action of domperidone.    I will reach out to your GI team to see if we can get more refills on your next order for phenergan. As always, please reach out if you have further questions.    Hattie Lazo PharmD, BCACP  [facility] Pharmacist   [facility]  Phone: [phone]

## 2022-06-01 DIAGNOSIS — Z11.59 ENCOUNTER FOR SCREENING FOR OTHER VIRAL DISEASES: Primary | ICD-10-CM

## 2022-06-02 ENCOUNTER — OFFICE VISIT (OUTPATIENT)
Dept: TRANSPLANT | Facility: CLINIC | Age: 57
End: 2022-06-02
Attending: PEDIATRICS
Payer: COMMERCIAL

## 2022-06-02 VITALS
SYSTOLIC BLOOD PRESSURE: 126 MMHG | BODY MASS INDEX: 22.02 KG/M2 | OXYGEN SATURATION: 97 % | WEIGHT: 140.6 LBS | HEART RATE: 120 BPM | DIASTOLIC BLOOD PRESSURE: 83 MMHG

## 2022-06-02 DIAGNOSIS — E89.1 POST-PANCREATECTOMY DIABETES (H): Primary | ICD-10-CM

## 2022-06-02 DIAGNOSIS — E13.9 POST-PANCREATECTOMY DIABETES (H): Primary | ICD-10-CM

## 2022-06-02 DIAGNOSIS — Z90.410 POST-PANCREATECTOMY DIABETES (H): Primary | ICD-10-CM

## 2022-06-02 PROCEDURE — 99214 OFFICE O/P EST MOD 30 MIN: CPT | Performed by: PEDIATRICS

## 2022-06-02 RX ORDER — METHOCARBAMOL 750 MG/1
750 TABLET, FILM COATED ORAL 4 TIMES DAILY
COMMUNITY
Start: 2022-05-28 | End: 2022-10-13

## 2022-06-02 NOTE — PATIENT INSTRUCTIONS
1)  No changes to acarbose (50 mg per meal, TID)    2)  Follow up Dec 1 at 1pm.    3)  Will keep you posted with any updates on domperidone.

## 2022-06-02 NOTE — LETTER
2022         RE: Dinora Mcghee  [address] [facility] [address]        Dear Colleague,    Thank you for referring your patient, Dinora Mcghee, to the [facility]. Please see a copy of my visit note below.    [facility]  Islet Autotransplant, Diabetes Follow Up    Problem List:  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     Incisional hernia, without obstruction or gangrene     Adhesive capsulitis of shoulder, unspecified laterality     S/P hernia repair     Headache, chronic migraine without aura     Chronic pain syndrome     Iron deficiency anemia     Hypoglycemia     Adult failure to thrive     Abdominal pain, generalized     Gastrostomy tube obstruction (H)     Intra-abdominal abscess (H)     Postprocedural intraabdominal abscess       HPI:  Dinora is a 57 year old female here for follow up oflaparoscopic assisted total pancreatectomy, islet cell autotransplant, splenectomy, gastrojejunostomy tube revision, choledochoduodenostomy, duodenojejunostomy, Vera-Y reconstruction performed on 2016.  At the time of the procedure, the patient received:  Total Islet number: [id number].  Total Islet number/k.  Islet equivalents: [id number]  Islet equivalents/kilogram: 4091    Post-surgical course was complicated by two minor procedures for a retained foreign body near GJ site in 2017  and hernia repair 8/2018, and more recently by gastroparesis.  She has been insulin independent since about 1 year after surgery.    - Gastroparesis is her major issue post TPIAT. Admitted multiple times. GJ was helpful for short time but did not tolerate well.  Has tried multiple other approaches and continues to worsen, nothing offering benefit.  Is on our list to access domperidone once we have an IND to prescribe  - Also has hypoglycemia (prior treatments SGLT2 inhibitor not helpful, acarbose is helpful).     At today's visit,   Dinora returns today for routine follow up of hyper and hypoglycemia management post TPIAT.  She is on no insulin  She takes acarbose now 50 mg with meals.  She is also eating little due to her gastroparesis. With this context, BG control is quite good currently. S he continues to need Yandy for monitoring.  She is going into the infusion center for regular fluids and will get a port next week.      Diabetes history:  Current insulin regimen:  none    Wearing Yandy                Recent hemoglobin A1c levels:  Lab Results   Component Value Date    A1C 5.4 02/17/2022    A1C 5.2 11/27/2021    A1C 5.5 09/18/2021    A1C 5.4 08/05/2021    A1C 5.7 05/12/2021    A1C 5.9 04/01/2021    A1C 5.5 12/17/2020    A1C 5.8 07/30/2020       Hypoglycemia history: mild lows but rapid falls- less of an issue recentl.y. The patient has had 0 episodes of severe hypoglycemia (seizure, coma, or neuroglycopenic symptoms severe enough to require assistance from another person).  Blood sugars were reviewed from the patient records and/or the meter download.          Review of systems:  A complete ROS is negative except as noted in HPI above.  With her gastroparesis, she does have vomiting and bloating.  She is stopping amitiza and mag citrate and starting a new motility med.    Past Medical and Surgical History:  Past Medical History:   Diagnosis Date     Allergic rhinitis, cause unspecified      Allergy to other  foods     strawberries, apples, celeries, alice, watermelon     Arthritis     left knee     Choledocholithiasis     long after cholecystectomy     Chronic abdominal pain      Chronic constipation      Chronic nausea      Chronic pancreatitis (H)      Degeneration of lumbar or lumbosacral intervertebral disc      Esophageal reflux     w/ hiatal hernia     Gastroparesis      Hiatal hernia      History of pituitary adenoma     s/p resection     Hypothyroidism      Migraines      Mild hyperlipidemia      On tube feeding diet     presence of GJ tube     Pancreatic disease     PD stricture, 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 endometriosis growth     APPENDECTOMY        SECTION       COLONOSCOPY       ENDOSCOPIC INSERTION TUBE GASTROSTOMY N/A 2021    Procedure: Gastrojejonostomy placement with jejunopexy, PEG tube exchange;  Surgeon: Zackery Montoya MD;  [facility]     ENDOSCOPIC RETROGRADE CHOLANGIOPANCREATOGRAM N/A 2015    Procedure: ENDOSCOPIC RETROGRADE CHOLANGIOPANCREATOGRAM;  Surgeon: Mandeep Park MD;  [facility]     ENDOSCOPIC RETROGRADE CHOLANGIOPANCREATOGRAM N/A 2016    Procedure: COMBINED ENDOSCOPIC RETROGRADE CHOLANGIOPANCREATOGRAPHY, PLACE TUBE/STENT;  Surgeon: Mandeep Park MD;  [facility]     ENDOSCOPIC RETROGRADE CHOLANGIOPANCREATOGRAM N/A 3/17/2016    Procedure: COMBINED ENDOSCOPIC RETROGRADE CHOLANGIOPANCREATOGRAPHY, REMOVE FOREIGN BODY OR STENT/TUBE;  Surgeon: Mandeep Park MD;  [facility]     ENDOSCOPIC RETROGRADE CHOLANGIOPANCREATOGRAM N/A 2016    Procedure: COMBINED ENDOSCOPIC RETROGRADE CHOLANGIOPANCREATOGRAPHY, PLACE TUBE/STENT;  Surgeon: Mandeep Park MD;   [facility]     ENDOSCOPIC RETROGRADE CHOLANGIOPANCREATOGRAM N/A 8/26/2016    Procedure: COMBINED ENDOSCOPIC RETROGRADE CHOLANGIOPANCREATOGRAPHY, REMOVE FOREIGN BODY OR STENT/TUBE;  Surgeon: Mandeep Park MD;  [facility]     ENDOSCOPIC ULTRASOUND UPPER GASTROINTESTINAL TRACT (GI) N/A 10/3/2016    Procedure: ENDOSCOPIC ULTRASOUND, ESOPHAGOSCOPY / UPPER GASTROINTESTINAL TRACT (GI);  Surgeon: Guru Joes Rdoas MD;  [facility]     ENTEROSCOPY SMALL BOWEL N/A 2/3/2022    Procedure: ENTEROSCOPY with possible fistula closure;  Surgeon: Francisco Dodson MD;  Location:  GI     ESOPHAGOSCOPY, GASTROSCOPY, DUODENOSCOPY (EGD), COMBINED N/A 6/24/2015    Procedure: COMBINED ESOPHAGOSCOPY, GASTROSCOPY, DUODENOSCOPY (EGD), REMOVE FOREIGN BODY;  Surgeon: Mandeep Park MD;  Location:  GI     ESOPHAGOSCOPY, GASTROSCOPY, DUODENOSCOPY (EGD), COMBINED N/A 10/25/2015    Procedure: COMBINED ESOPHAGOSCOPY, GASTROSCOPY, DUODENOSCOPY (EGD);  Surgeon: Sammy Amaro MD;  Location:  GI     ESOPHAGOSCOPY, GASTROSCOPY, DUODENOSCOPY (EGD), COMBINED N/A 10/25/2015    Procedure: COMBINED ESOPHAGOSCOPY, GASTROSCOPY, DUODENOSCOPY (EGD), BIOPSY SINGLE OR MULTIPLE;  Surgeon: Sammy Amaro MD;  [facility]     ESOPHAGOSCOPY, GASTROSCOPY, DUODENOSCOPY (EGD), DILATATION, COMBINED       EXCISE LESION TRUNK N/A 4/17/2017    Procedure: EXCISE LESION TRUNK;  Removal of Abdominal Foreign Body;  Surgeon: Nestor Phoenix MD;  [facility]     HC ESOPH/GAS REFLUX TEST W NASAL IMPED >1 HR N/A 11/19/2015    Procedure: ESOPHAGEAL IMPEDENCE FUNCTION TEST WITH 24 HOUR PH GREATER THAN 1 HOUR;  Surgeon: Thiago Apple MD;  [facility]     HC UGI ENDOSCOPY DIAG W BIOPSY  9/17/08     HC UGI ENDOSCOPY DIAG W BIOPSY  9/27/12     HC UGI ENDOSCOPY W ESOPHAGEAL DILATION BALLOON <30MM  9/17/08     HC UGI ENDOSCOPY W EUS N/A 5/5/2015    Procedure: COMBINED ENDOSCOPIC ULTRASOUND, ESOPHAGOSCOPY,  GASTROSCOPY, DUODENOSCOPY (EGD);  Surgeon: Wm Dueñas MD;  [facility]     HC WRIST ARTHROSCOP,RELEASE XVERS LIG Bilateral 08     INJECT TRANSVERSUS ABDOMINIS PLANE (TAP) BLOCK BILATERAL Left 2016    Procedure: INJECT TRANSVERSUS ABDOMINIS PLANE (TAP) BLOCK BILATERAL;  Surgeon: Dickson Corrigan MD;  [facility]     laparoscopic pineda       LAPAROSCOPIC HERNIORRHAPHY INCISIONAL N/A 2018    Procedure: LAPAROSCOPIC HERNIORRHAPHY INCISIONAL;  Laparoscopic Incisional Hernia Repair with Symbotex Mesh Implant;  Surgeon: Nestor Phoenix MD;  [facility]     LAPAROSCOPIC PANCREATECTOMY, TRANSPLANT AUTO ISLET CELL N/A 2016    Procedure: LAPAROSCOPIC PANCREATECTOMY, TRANSPLANT AUTO ISLET CELL;  Surgeon: Nestor Phoenix MD;  [facility]     REMOVE GASTROSTOMY TUBE ADULT N/A 2022    Procedure: REMOVAL, GASTROSTOMY TUBE, ADULT;  Surgeon: Mandeep Park MD;  [facility]     REPLACE GASTROJEJUNOSTOMY TUBE, PERCUTANEOUS N/A 2021    Procedure: GASTROJEJUNOSTOMY TUBE PLACEMENT, PERCUTANEOUS, WITH GASTROPEXY;  Surgeon: Mandeep Park MD;  [facility]     REPLACE GASTROJEJUNOSTOMY TUBE, PERCUTANEOUS N/A 2021    Procedure: REPLACEMENT, GASTROJEJUNOSTOMY TUBE, PERCUTANEOUS;  Surgeon: Zackery Montoya MD;  [facility]     REPLACE JEJUNOSTOMY TUBE, PERCUTANEOUS N/A 9/10/2021    Procedure: UPPER ENDOSCOPY, REPLACEMENT OF PERCUTANEOUS GASTROJEJUNOSTOMY TUBE, T-TAG GASTROPEXY;  Surgeon: Zackery Montoya MD;  [facility]     REPLACE JEJUNOSTOMY TUBE, PERCUTANEOUS N/A 2021    Procedure: REPLACEMENT, JEJUNOSTOMY TUBE, PERCUTANEOUS;  Surgeon: Mandeep Park MD;  [facility]     transphenoidal pituitary resection       ZZC  DELIVERY ONLY       ZZC  DELIVERY ONLY      repeat c section with incidental cystotomy with repair     ZZC EXCIS PITUITARY,TRANSNASAL/SEPTAL      pituitary tumor removed  for diabetes insipidus     ZZC TOTAL ABDOM HYSTERECTOMY  1999    w/ bilateral salpingoophorectomy        Family History:  New changes since last visit:  none  Family History   Problem Relation Age of Onset     Lipids Mother      Hypertension Mother      Thyroid Disease Mother      Depression Mother      Angina Mother      GERD Mother      Skin Cancer Mother      Migraines Mother      Eye Disorder Father         cataract, detached retina     Myocardial Infarction Father 60     Lipids Father      Cerebrovascular Disease Father      Depression Father      Substance Abuse Father      Anesthesia Reaction Father         stroke right after surgery     Cataracts Father      Osteoarthritis Father      Ulcerative Colitis Father      Interpersonal Violence Sister      Coronary Artery Disease Sister         angioplasty     GERD Sister      Substance Abuse Sister      Depression Sister      Thyroid Disease Sister      Eye Disorder Maternal Grandmother         cataract     Thyroid Disease Maternal Grandmother      Diabetes Maternal Grandfather      Eye Disorder Paternal Grandmother         cataract     Eye Disorder Paternal Grandfather         cataract     Diabetes Paternal Grandfather      Eye Disorder Son         ptosis     Depression Son      Anxiety Disorder Son      Heart Disease Paternal Aunt      Diabetes Paternal Aunt      Diabetes Paternal Uncle      Heart Disease Paternal Uncle      Depression Nephew      Anxiety Disorder Nephew      Thyroid Disease Nephew      Diabetes Type 2  Cousin         paternal cousin       Social History:  Social History     Social History Narrative     with 3 children and a dog.  No smoking, etoh or drug use.  Worked as a  for [employer] in [place] in the past.  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     Physical Exam:  Vitals: /83    Pulse 120   Wt 63.8 kg (140 lb 9.6 oz)   SpO2 97%   BMI 22.02 kg/m    BMI= Body mass index is 22.02 kg/m .  General:  Appearance is normal, no acute distress  HEENT:  NC/AT, sclera appear white  Neck:  No obvious thyromegaly  CV/Lungs:  Non distressed breathing  Skin:  No apparent rashes  Neuro:  Normal mental status  Psych:  Normal affect          Assessment:  1.  Post pancreatectomy diabetes mellitus, s/p total pancreatectomy and islet autotransplant.  (ICD-10 E89.1)  2.  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   Gastroparesis  4.  Nausea, vomiting      Dinora is a 57 year old with history of chronic pancreatitis who is s/p total pancreatectomy and islet autotransplant.  She has been insulin independent with A1c in goal range.    The major issue in the past year has been hypoglycemia.  At the current time, this seems to be well controlled, although her small meal intake may also be contributing to this.  We are not making any medication changes today.  We did discuss domperidone and the current progress on an IND and steps once she is able to access this.      Plan:  1.  Changes to current diabetes regimen:  Patient Instructions   1)  No changes to acarbose (50 mg per meal, TID)    2)  Follow up Dec 1 at 1pm.    3)  Will keep you posted with any updates on domperidone.      2.  Frequency of blood sugar checks:  Keep wearing Yandy-- this is much better for Dinora than fingersticks because with fingersticks we miss the really critical data of the post prandial excursions.    3.  Continue routine follow up for autoislet transplant patients:  Mixed meal test (6 mL/kg BoostHP to max of 360 mL) at 3 months, 6 months, and once a year post transplant.  Hemoglobin A1c levels at these time points and quarterly.    4.  Other issues addressed today:  none    Follow up:  4 mos    Contact me for  questions at [phone] or [phone].  Emergency number to reach pediatric endocrinology after hours is [phone].      Dr. Gloria Melissa MD  [facility] [place]  Director of Research, Islet Auto-transplant Program  Phone:  [phone]  Electronically signed: June 8, 2022 at 8:44 AM      Review of the result(s) of each unique test - HbA1c, jim  30 minutes spent on the date of the encounter doing chart review, history and exam, documentation, downloading and reviewing CGM data,  and further activities per the note      Again, thank you for allowing me to participate in the care of your patient.        Sincerely,        Gloria Melissa MD

## 2022-06-02 NOTE — NURSING NOTE
Chief Complaint   Patient presents with     TP-IAT Transplant     Follow up      Blood pressure 126/83, pulse 120, weight 63.8 kg (140 lb 9.6 oz), SpO2 97 %, not currently breastfeeding.    Linda Rubi, CMA

## 2022-06-02 NOTE — PROGRESS NOTES
[facility]  Islet Autotransplant, Diabetes Follow Up    Problem List:  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     Incisional hernia, without obstruction or gangrene     Adhesive capsulitis of shoulder, unspecified laterality     S/P hernia repair     Headache, chronic migraine without aura     Chronic pain syndrome     Iron deficiency anemia     Hypoglycemia     Adult failure to thrive     Abdominal pain, generalized     Gastrostomy tube obstruction (H)     Intra-abdominal abscess (H)     Postprocedural intraabdominal abscess       HPI:  Dinora is a 57 year old female here for follow up oflaparoscopic assisted total pancreatectomy, islet cell autotransplant, splenectomy, gastrojejunostomy tube revision, choledochoduodenostomy, duodenojejunostomy, Vera-Y reconstruction performed on 2016.  At the time of the procedure, the patient received:  Total Islet number: [id number].  Total Islet number/k.  Islet equivalents: [id number]  Islet equivalents/kilogram: 4091    Post-surgical course was complicated by two minor procedures for a retained foreign body near GJ site in 2017 and hernia repair 2018, and more recently by gastroparesis.  She has been insulin independent since about 1 year after surgery.    - Gastroparesis is her major issue post TPIAT. Admitted multiple times. GJ was helpful for short time but did not  tolerate well.  Has tried multiple other approaches and continues to worsen, nothing offering benefit.  Is on our list to access domperidone once we have an IND to prescribe  - Also has hypoglycemia (prior treatments SGLT2 inhibitor not helpful, acarbose is helpful).     At today's visit,   Dinora returns today for routine follow up of hyper and hypoglycemia management post TPIAT.  She is on no insulin  She takes acarbose now 50 mg with meals.  She is also eating little due to her gastroparesis. With this context, BG control is quite good currently. S he continues to need Yandy for monitoring.  She is going into the infusion center for regular fluids and will get a port next week.      Diabetes history:  Current insulin regimen:  none    Wearing Yandy                Recent hemoglobin A1c levels:  Lab Results   Component Value Date    A1C 5.4 02/17/2022    A1C 5.2 11/27/2021    A1C 5.5 09/18/2021    A1C 5.4 08/05/2021    A1C 5.7 05/12/2021    A1C 5.9 04/01/2021    A1C 5.5 12/17/2020    A1C 5.8 07/30/2020       Hypoglycemia history: mild lows but rapid falls- less of an issue recentl.y. The patient has had 0 episodes of severe hypoglycemia (seizure, coma, or neuroglycopenic symptoms severe enough to require assistance from another person).  Blood sugars were reviewed from the patient records and/or the meter download.          Review of systems:  A complete ROS is negative except as noted in HPI above.  With her gastroparesis, she does have vomiting and bloating.  She is stopping amitiza and mag citrate and starting a new motility med.    Past Medical and Surgical History:  Past Medical History:   Diagnosis Date     Allergic rhinitis, cause unspecified      Allergy to other foods     strawberries, apples, celeries, alice, watermelon     Arthritis     left knee     Choledocholithiasis     long after cholecystectomy     Chronic abdominal pain      Chronic constipation      Chronic nausea      Chronic pancreatitis (H)       Degeneration of lumbar or lumbosacral intervertebral disc      Esophageal reflux     w/ hiatal hernia     Gastroparesis      Hiatal hernia      History of pituitary adenoma     s/p resection     Hypothyroidism      Migraines      Mild hyperlipidemia      On tube feeding diet     presence of GJ tube     Pancreatic disease     PD stricture, 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 endometriosis growth     APPENDECTOMY        SECTION       COLONOSCOPY       ENDOSCOPIC INSERTION TUBE GASTROSTOMY N/A 2021    Procedure: Gastrojejonostomy placement with jejunopexy, PEG tube exchange;  Surgeon: Zackery Montoya MD;  [facility]     ENDOSCOPIC RETROGRADE CHOLANGIOPANCREATOGRAM N/A 2015    Procedure: ENDOSCOPIC RETROGRADE CHOLANGIOPANCREATOGRAM;  Surgeon: Mandeep Park MD;  Location:  OR     ENDOSCOPIC RETROGRADE CHOLANGIOPANCREATOGRAM N/A 2016    Procedure: COMBINED ENDOSCOPIC RETROGRADE CHOLANGIOPANCREATOGRAPHY, PLACE TUBE/STENT;  Surgeon: Mandeep Prak MD;  [facility]     ENDOSCOPIC RETROGRADE CHOLANGIOPANCREATOGRAM N/A 3/17/2016    Procedure: COMBINED ENDOSCOPIC RETROGRADE CHOLANGIOPANCREATOGRAPHY, REMOVE FOREIGN BODY OR STENT/TUBE;  Surgeon: Mandeep Park MD;  [facility]     ENDOSCOPIC RETROGRADE CHOLANGIOPANCREATOGRAM N/A 2016    Procedure: COMBINED ENDOSCOPIC RETROGRADE CHOLANGIOPANCREATOGRAPHY, PLACE TUBE/STENT;  Surgeon: Mandeep Park MD;  Location:  OR     ENDOSCOPIC RETROGRADE CHOLANGIOPANCREATOGRAM N/A 2016    Procedure: COMBINED ENDOSCOPIC RETROGRADE CHOLANGIOPANCREATOGRAPHY, REMOVE FOREIGN BODY OR STENT/TUBE;  Surgeon: Mandeep Park MD;  Location:  OR      ENDOSCOPIC ULTRASOUND UPPER GASTROINTESTINAL TRACT (GI) N/A 10/3/2016    Procedure: ENDOSCOPIC ULTRASOUND, ESOPHAGOSCOPY / UPPER GASTROINTESTINAL TRACT (GI);  Surgeon: Guru Jose Rodas MD;  [facility]     ENTEROSCOPY SMALL BOWEL N/A 2/3/2022    Procedure: ENTEROSCOPY with possible fistula closure;  Surgeon: Francisco Dodson MD;  Location: [facility]     ESOPHAGOSCOPY, GASTROSCOPY, DUODENOSCOPY (EGD), COMBINED N/A 6/24/2015    Procedure: COMBINED ESOPHAGOSCOPY, GASTROSCOPY, DUODENOSCOPY (EGD), REMOVE FOREIGN BODY;  Surgeon: Mandeep Park MD;  [facility]     ESOPHAGOSCOPY, GASTROSCOPY, DUODENOSCOPY (EGD), COMBINED N/A 10/25/2015    Procedure: COMBINED ESOPHAGOSCOPY, GASTROSCOPY, DUODENOSCOPY (EGD);  Surgeon: Sammy Amaro MD;  [facility]     ESOPHAGOSCOPY, GASTROSCOPY, DUODENOSCOPY (EGD), COMBINED N/A 10/25/2015    Procedure: COMBINED ESOPHAGOSCOPY, GASTROSCOPY, DUODENOSCOPY (EGD), BIOPSY SINGLE OR MULTIPLE;  Surgeon: Sammy Amaro MD;  [facility]     ESOPHAGOSCOPY, GASTROSCOPY, DUODENOSCOPY (EGD), DILATATION, COMBINED       EXCISE LESION TRUNK N/A 4/17/2017    Procedure: EXCISE LESION TRUNK;  Removal of Abdominal Foreign Body;  Surgeon: Nestor Phoenix MD;  [facility]     HC ESOPH/GAS REFLUX TEST W NASAL IMPED >1 HR N/A 11/19/2015    Procedure: ESOPHAGEAL IMPEDENCE FUNCTION TEST WITH 24 HOUR PH GREATER THAN 1 HOUR;  Surgeon: Thiago Apple MD;  Location: [facility]     HC UGI ENDOSCOPY DIAG W BIOPSY  9/17/08     HC UGI ENDOSCOPY DIAG W BIOPSY  9/27/12     HC UGI ENDOSCOPY W ESOPHAGEAL DILATION BALLOON <30MM  9/17/08     HC UGI ENDOSCOPY W EUS N/A 5/5/2015    Procedure: COMBINED ENDOSCOPIC ULTRASOUND, ESOPHAGOSCOPY, GASTROSCOPY, DUODENOSCOPY (EGD);  Surgeon: Wm Dueñas MD;  [facility]     HC WRIST ARTHROSCOP,RELEASE XVERS LIG Bilateral 12/17/08     INJECT TRANSVERSUS ABDOMINIS PLANE (TAP) BLOCK BILATERAL Left 9/22/2016    Procedure: INJECT  TRANSVERSUS ABDOMINIS PLANE (TAP) BLOCK BILATERAL;  Surgeon: Dickson Corrigan MD;  [facility]     laparoscopic pineda       LAPAROSCOPIC HERNIORRHAPHY INCISIONAL N/A 2018    Procedure: LAPAROSCOPIC HERNIORRHAPHY INCISIONAL;  Laparoscopic Incisional Hernia Repair with Symbotex Mesh Implant;  Surgeon: Nestor Phoenix MD;  [facility]     LAPAROSCOPIC PANCREATECTOMY, TRANSPLANT AUTO ISLET CELL N/A 2016    Procedure: LAPAROSCOPIC PANCREATECTOMY, TRANSPLANT AUTO ISLET CELL;  Surgeon: Nestor Phoenix MD;  [facility]     REMOVE GASTROSTOMY TUBE ADULT N/A 2022    Procedure: REMOVAL, GASTROSTOMY TUBE, ADULT;  Surgeon: Mandeep Park MD;  [facility]     REPLACE GASTROJEJUNOSTOMY TUBE, PERCUTANEOUS N/A 2021    Procedure: GASTROJEJUNOSTOMY TUBE PLACEMENT, PERCUTANEOUS, WITH GASTROPEXY;  Surgeon: Mandeep Park MD;  [facility]     REPLACE GASTROJEJUNOSTOMY TUBE, PERCUTANEOUS N/A 2021    Procedure: REPLACEMENT, GASTROJEJUNOSTOMY TUBE, PERCUTANEOUS;  Surgeon: Zackery Montoya MD;  [facility]     REPLACE JEJUNOSTOMY TUBE, PERCUTANEOUS N/A 9/10/2021    Procedure: UPPER ENDOSCOPY, REPLACEMENT OF PERCUTANEOUS GASTROJEJUNOSTOMY TUBE, T-TAG GASTROPEXY;  Surgeon: Zackery Montoya MD;  [facility]     REPLACE JEJUNOSTOMY TUBE, PERCUTANEOUS N/A 2021    Procedure: REPLACEMENT, JEJUNOSTOMY TUBE, PERCUTANEOUS;  Surgeon: Mandeep Park MD;  [facility]     transphenoidal pituitary resection       ZZC  DELIVERY ONLY       ZZC  DELIVERY ONLY      repeat c section with incidental cystotomy with repair     ZZC EXCIS PITUITARY,TRANSNASAL/SEPTAL      pituitary tumor removed for diabetes insipidus     ZZC TOTAL ABDOM HYSTERECTOMY      w/ bilateral salpingoophorectomy        Family History:  New changes since last visit:  none  Family History   Problem Relation Age of Onset     Lipids Mother      Hypertension  Mother      Thyroid Disease Mother      Depression Mother      Angina Mother      GERD Mother      Skin Cancer Mother      Migraines Mother      Eye Disorder Father         cataract, detached retina     Myocardial Infarction Father 60     Lipids Father      Cerebrovascular Disease Father      Depression Father      Substance Abuse Father      Anesthesia Reaction Father         stroke right after surgery     Cataracts Father      Osteoarthritis Father      Ulcerative Colitis Father      Interpersonal Violence Sister      Coronary Artery Disease Sister         angioplasty     GERD Sister      Substance Abuse Sister      Depression Sister      Thyroid Disease Sister      Eye Disorder Maternal Grandmother         cataract     Thyroid Disease Maternal Grandmother      Diabetes Maternal Grandfather      Eye Disorder Paternal Grandmother         cataract     Eye Disorder Paternal Grandfather         cataract     Diabetes Paternal Grandfather      Eye Disorder Son         ptosis     Depression Son      Anxiety Disorder Son      Heart Disease Paternal Aunt      Diabetes Paternal Aunt      Diabetes Paternal Uncle      Heart Disease Paternal Uncle      Depression Nephew      Anxiety Disorder Nephew      Thyroid Disease Nephew      Diabetes Type 2  Cousin         paternal cousin       Social History:  Social History     Social History Narrative     with 3 children and a dog.  No smoking, etoh or drug use.  Worked as a  for [employer] in [place] in the past.  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     Physical Exam:  Vitals: /83   Pulse 120   Wt 63.8 kg (140 lb 9.6 oz)   SpO2 97%   BMI 22.02 kg/m    BMI= Body mass index is 22.02 kg/m .  General:  Appearance is normal, no acute distress  HEENT:  NC/AT, sclera appear white  Neck:  No obvious thyromegaly  CV/Lungs:  Non  distressed breathing  Skin:  No apparent rashes  Neuro:  Normal mental status  Psych:  Normal affect          Assessment:  1.  Post pancreatectomy diabetes mellitus, s/p total pancreatectomy and islet autotransplant.  (ICD-10 E89.1)  2.  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   Gastroparesis  4.  Nausea, vomiting      Dinora is a 57 year old with history of chronic pancreatitis who is s/p total pancreatectomy and islet autotransplant.  She has been insulin independent with A1c in goal range.    The major issue in the past year has been hypoglycemia.  At the current time, this seems to be well controlled, although her small meal intake may also be contributing to this.  We are not making any medication changes today.  We did discuss domperidone and the current progress on an IND and steps once she is able to access this.      Plan:  1.  Changes to current diabetes regimen:  Patient Instructions   1)  No changes to acarbose (50 mg per meal, TID)    2)  Follow up Dec 1 at 1pm.    3)  Will keep you posted with any updates on domperidone.      2.  Frequency of blood sugar checks:  Keep wearing Yandy-- this is much better for Dinora than fingersticks because with fingersticks we miss the really critical data of the post prandial excursions.    3.  Continue routine follow up for autoislet transplant patients:  Mixed meal test (6 mL/kg BoostHP to max of 360 mL) at 3 months, 6 months, and once a year post transplant.  Hemoglobin A1c levels at these time points and quarterly.    4.  Other issues addressed today:  none    Follow up:  4 mos    Contact me for questions at [phone] or [phone].  Emergency number to reach pediatric endocrinology after hours is [phone].      Dr. Gloria Melissa MD  [facility] [place]  Director of Research, Islet  Auto-transplant Program  Phone:  [phone]  Electronically signed: June 8, 2022 at 8:44 AM      Review of the result(s) of each unique test - HbA1c, jim  30 minutes spent on the date of the encounter doing chart review, history and exam, documentation, downloading and reviewing CGM data,  and further activities per the note

## 2022-06-07 ENCOUNTER — MYC MEDICAL ADVICE (OUTPATIENT)
Dept: INTERVENTIONAL RADIOLOGY/VASCULAR | Facility: CLINIC | Age: 57
End: 2022-06-07
Payer: COMMERCIAL

## 2022-06-08 ENCOUNTER — VIRTUAL VISIT (OUTPATIENT)
Dept: GASTROENTEROLOGY | Facility: CLINIC | Age: 57
End: 2022-06-08
Payer: COMMERCIAL

## 2022-06-08 VITALS — BODY MASS INDEX: 22.08 KG/M2 | WEIGHT: 141 LBS

## 2022-06-08 DIAGNOSIS — Z91.89 AT HIGH RISK FOR ABNORMAL GASTROINTESTINAL MOTILITY: Primary | ICD-10-CM

## 2022-06-08 DIAGNOSIS — K59.00 CONSTIPATION, UNSPECIFIED CONSTIPATION TYPE: ICD-10-CM

## 2022-06-08 DIAGNOSIS — Z94.83 STATUS POST PANCREAS TRANSPLANTATION (H): ICD-10-CM

## 2022-06-08 DIAGNOSIS — K31.84 GASTROPARESIS: ICD-10-CM

## 2022-06-08 DIAGNOSIS — R10.84 ABDOMINAL PAIN, GENERALIZED: ICD-10-CM

## 2022-06-08 PROCEDURE — 99417 PROLNG OP E/M EACH 15 MIN: CPT | Performed by: INTERNAL MEDICINE

## 2022-06-08 PROCEDURE — 99215 OFFICE O/P EST HI 40 MIN: CPT | Mod: 95 | Performed by: INTERNAL MEDICINE

## 2022-06-08 ASSESSMENT — PAIN SCALES - GENERAL: PAINLEVEL: MILD PAIN (3)

## 2022-06-08 NOTE — PROGRESS NOTES
"Dinora is a 57 year old who is being evaluated via a billable video visit.    Patient denies any changes since echeck-in regarding medication and allergies and states all information entered during echeck-in remains accurate.      How would you like to obtain your AVS? [place]  If the video visit is dropped, the invitation should be resent by: Text to cell phone: [phone]  Will anyone else be joining your video visit? No    Saray Lynncathy      Video-Visit Details    Type of service:  Video Visit    Video Start Time: 9:16 a.m.  Video End Time:10:07 a.m.    Originating Location (pt. Location): Home    Distant Location (provider location):  [facility] [place]     Platform used for Video Visit: [employer]     -----------------------------  GI CLINIC VISIT    CC: follow-up      ASSESSMENT/PLAN:  (Z91.89) At high risk for abnormal gastrointestinal motility  (primary encounter diagnosis)  (K31.84) Gastroparesis  (R10.84) Abdominal pain, generalized  (R63.4) Weight loss  (Z94.83) Status post pancreas transplantation (H)  (K59.09) Other constipation     Severe gastroparesis, progressing -   Postprandial pain, fullness, and nausea even with infrequent vomiting despite round-the-clock scopolamine, additional antiemetics. Did have prior weight loss leading to TF re-initiation. Unfortunately had marked difficulty with G and J tubes (had previously occurred as well around time of TPIAT) though tolerated tube feeding rate reasonably well.     Has returned to weight of 140 lbs and maintained with oral diet (small frequent meals with liquid supplementation and IVF hydration). Still with some vomiting.      Need for antiemetics further complicating baseline constipation, though currently she feels she is emptying ok on her bowel regimen. We did discuss some adjustments to this as outlined below. As discussed in my prior note \"If [Motegrity] needs to be stopped, we discussed a possible re-trial of " "Trulance --> would do a bowel cleanout first, then start Trulance and may be able to avoid marked bowel emptying with start of med (potentially avoid nighttime incontinence which occurred during first couple days of starting Trulance in the past).\"         - needs G-tube management orders, referral back through  Home care,  - will need [employer] exchange teaching, maybe able to arrange through [facility] - will attempt to coordinate  - let us know if G-tube issues, can refer to wound-ostomy clinic as needed  - track relationship of food intake and type with hypoglcemic episodes - is this just starvation or reactionary hypoglycemia, continue to communicate with Dr. Shin cartwright to continue current bowel meds - mg citrate cleanout every 3-4 days   - follow-up in GI clinic in 12 weeks      It was a pleasure to participate in the care of this patient; please contact us with any further questions.  A total of 51 video face-to-face minutes was spent with this patient, >50% of which was counseling regarding the above delineated issues. An additional 19 minutes was spent on the date of the encounter doing chart review, documentation, care coordination, and further activities as noted above. Time beyond that was spent on separate days as well.     Vijaya Genao MD   of Medicine  Division of Gastroenterology, Hepatology and Nutrition  [facility]    ---------------------------------------------------------------------------------------------------  HPI:  Ms. 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 She was initially seen in general GI " clinic by this writer on 11/1/2017 and has been seen by myself and ANA Gan since that time. Her pancreas txplt surgeon was Dr. Phoenix; she now follows with Dr. Honeycutt. She also follows with Dr. Park of GI in [facility]. Her last visit with me was 11/3/2021, though she has been seen by my colleague Dr. Park a few times since then.     History summarized and updated from previous documentation from ANA Gan and myself. Please see previous notes for further details      Gastroparesis  Initially diagnosed years ago with 2-hour study gastric emptying study at [facility]. Repeat testing here with 4-hour GES on 6/27/2016 with 49% remaining at 4 hours (?on pain meds at the time).  This was addressed with prior GJ tube in fall 2016, NG tube used for venting. Ultimately, after TPIAT, she was able to wean off of TFs and return to a regular diet.  Patient did have some persisting nausea and vomiting which she treated over the years with various medications including scopolamine patch, prochlorperazine (most mornings), and promethazine (most evenings).  She has also been seen by neurology and psychology to assist with insomnia and with migraines (and any contribution they may have to nausea).       Patient reports that she had been feeling pretty well up until early November 2020.  At that time she was seen at the [facility] emergency department on November 18 for marked nausea and emesis.  At that time, blood work notable for normal BMP, low lipase, unremarkable hepatic panel.  CBC without elevation of WBCs though she did have elevated platelets, neutrophils and lymphocytes.  CT at that time with moderate stool burden, nonspecific fluid in small intestine.  No evidence of obstruction per documentation. Had similar episodes after this and dietary interventions were initiated as well as some work on her bowel regimen (no marked improvement after bowel cleanout). With some effort  "she was able to have some solid foods and focused on ingestion of 6-7 small meals a day. At some points she was on a liquid-only/juicing diet.  Initially these dietary changes seemed to help, but over time the response waned. Beyond dietary interventions for GP, she has tried metoclopramide with no response. Has not tried erythromycin or azithromycin due to anaphylactic event with prior azithromycin use (in ED in 2008).  Symptoms did not improve with bowel cleanout as mentioned above. Another course of rifaximin (previously successful at addressing bloating and stool issues) was not helpful for her pain, bloating/fullness, and n/v. Amitriptyline was increased to 60 mg nightly with no change in discomfort.    From winter 2020 to May/Saba she lost about 16 lbs (from 156-->140 lbs). She continued to experience more pronounced post-prandial pain and fullness and had vomiting of food after eating. Work-up for alternate causes (with SBFT, CTE, CTA) was normal.      By August 2021, Ms. Loo was open to consideration of TF restart. She had, by no fault of her own, a complicated course with her prior TFs and was, understandably, hoping to avoid it. But despite maximal efforts on her part was continuing to lose weight (down to 125 lbs at her lowest) and experienced significant discomfort with oral intake.        Initially a GJ was placed in Sept 2021, but this was replaced a few times due to J-tube coiling in stomach as well as G-tube clogging/malfunction. On 10/19/21 she had placement of a weighted GJ but continued to struggle with discomfort at insertion site, clogging/venting issues. Ultimately a direct jejunostomy was done and after struggling with this for awhile it was removed. She worked hard to return to an oral diet.     Currently, Ms. Loo has just a G-tube in place for venting.  She feels that this week has been a good week stating her gut has been \"working all week. It never worked this well before.\" She " actually had hunger on Tues (noted this was in 24 hour window after IV phenergan).    She generally can tolerate a small plate/1 cup/1 bowl of food without vomiting, 70% of the time. She has 5-6 of these servings a day and estimates this is 5763-6520 Kcal. More recently she has reintroduced meat - chicken, fish occasionally. Unfortunately, ground hamburger did not go as well. Ms. Loo continues to have regular protein supplement drinks and works hard to stay hydrated (takes liquid IV brand drinks, gatorade, water). She continues to vent and finds it helpful, though her most recent tubing is smaller caliber and thus has been clogging more frequently.  Her Creon use has been limited due to smaller meals. Overall her weight has been stable around 140 lbs, though she notes that she drops a few lbs in-between IVF infusions and weight goes back up after IV hydration.     For nausea, she continues on her chronic scopolamine patch and is still taking prochlorperazine and promethazine about every 6-8 hours.         Constipation/irregular bowel movements/bloating  Patient reports history of ongoing constipation for over 20 years after having her first child.  She has tried multiple interventions including regular bowel cleanouts, MiraLAX, senna, milk of magnesium, Linzess, and Amitiza for which she was on when she first came to [facility].  Note, patient also takes creon.    Amitiza had worked for awhile then lost effectiveness. She was trialed on Trulance but reported frequent loose stools in the initial days (including nocturnal stooling and incontinence) which prompted her to stop. Trial of rifaximin in the past with with improvement in bloating and stool consistency though, as stated above, a re-trial for her pain, n/v, and fullness was not successful.    Motegrity was then started with some success, though noted HAs. We discussed a possible switch in her regimen to address this, though she was most comfortable with  "continuing Motegrity and monitoring her symptoms.  Previously discussed combining daily constipation medications; noted patient reports cost for Amitiza was quite a lot  (running $1000+/month).     Today, she continues on Motegrity daily as well as Miralax 1 capful BID, 4 senna a day, 1000 mg Mag citrate daily, and stool softeners. With this she typically will have no BMs for 3-4 days (though has gone anywhere from 1-8 days), then have a day of emptying where she has multiple BMs over a day. These are often \"enormous\" and she does feel empty when complete. Noted her bowel pattern is complicated by her need for antiemetics. When she is \"backed up\" she will experience early satiety.     GERD, Dysphagia   Summarized/taken from prior documentation - not discussed today  Patient experiences GERD as substernal and throat burning with nocturnal relaxant causing choking. She had previously followed with Dr. George Flores in out clinic and reports a prior Schatzki's ring requiring previous dilation. Manometry was noral, and pH impedence had a DeMeester of 24 with positive symptoms association. Patient has treated GERD symptoms with BID PPI, Carafate, H2 blocker, and tums. At her last visit, she had been experiencing heartburn and has restarted omeprazole 40 mg daily with continued symptoms with specific food trigger.  Had also previously been on Zantac up to 300 mg a day.        Other health issues:     Ms. Loo has recently been dealing with a bulging disc in her back and is taking robaxin 750 mg QID, but really using TID. She feels this medication has improved her abd pain as well as her back pain. She is undergoing massage therapy, dry needle therapy, and physical therapy. She does feel her pelvic and L leg numbness is improving.      She continues to recover from COVID, but has had more issues with her allergies as of late.       She is no longer on amitriptyline and has had issues with sleep maintenance insomnia.      " She recently met with our GI pharmacist and discussed her medications with consideration of possible domperidone should it become available at our institution in the future.       PROBLEM LIST  Patient Active Problem List    Diagnosis Date Noted     Intra-abdominal abscess (H) 12/03/2021     Priority: Medium     Postprocedural intraabdominal abscess 12/03/2021     Priority: Medium     Gastrostomy tube obstruction (H) 11/27/2021     Priority: Medium     Abdominal pain, generalized 09/18/2021     Priority: Medium     Adult failure to thrive 08/16/2021     Priority: Medium     Added automatically from request for surgery [id number]       Hypoglycemia 08/05/2021     Priority: Medium     Iron deficiency anemia 03/22/2019     Priority: Medium     Headache, chronic migraine without aura 09/18/2018     Priority: Medium     Chronic pain syndrome 09/18/2018     Priority: Medium     S/P hernia repair 08/23/2018     Priority: Medium     Incisional hernia, without obstruction or gangrene 05/20/2018     Priority: Medium     Adhesive capsulitis of shoulder, unspecified laterality 11/14/2017     Priority: Medium     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  Document previously scanned on 1/12/17.  Validation form completed and scanned.  Code Status reflects choices in most recent ACP document..  Confirmed/documented designated decision maker(s).  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 Provider to review and confirm.       GERD (gastroesophageal reflux disease) 12/01/2010     Priority: Medium     Lumbago 04/18/2005     Priority: Medium     History of L5-S1 degenerative disk disease.         Sensorineural hearing loss 01/10/2005     Priority: Medium     Problem list name updated by automated process. Provider to review       Vertiginous syndrome and labyrinthine disorder 01/10/2005     Priority: Medium     Problem list name updated by automated process. Provider to review  IMO Regulatory Load OCT 2020       Sprain and strain of other specified sites of hip and thigh 12/09/2002     Priority: Medium     Chondromalacia of patella 12/09/2002     Priority: Medium     Need for prophylactic immunotherapy      Priority: Medium     trees, grass, srw, dust mites, cat       Allergic rhinitis due to other allergen 12/21/2001     Priority: Medium     Hypothyroidism      Priority: Medium     Problem list name updated by automated process. Provider to review         PERTINENT MEDICATIONS:  Current Outpatient Medications   Medication     acarbose (PRECOSE) 50 MG tablet     amylase-lipase-protease (CREON 24) 83108-24575 units CPEP per EC capsule     amylase-lipase-protease (CREON) 07908-56476 units CPEP per EC capsule     azelastine (ASTELIN) 0.1 % nasal spray     blood glucose (PAT CONTOUR NEXT) test strip     blood glucose monitoring (PAT MICROLET) lancets     cetirizine (ZYRTEC) 10 MG tablet     Continuous Blood Gluc Sensor (FREESTYLE LISA 2 SENSOR) MISC     Continuous Blood Gluc Sensor (FREESTYLE LISA 2 SENSOR) MISC     cyclobenzaprine (FLEXERIL) 10 MG tablet     diphenhydrAMINE (BENADRYL ALLERGY) 25 MG capsule     estradiol (VAGIFEM) 10 MCG TABS vaginal tablet     famotidine (PEPCID) 20 MG tablet     Glucagon (GVOKE HYPOPEN 1-PACK) 1 MG/0.2ML SOAJ     glucose 40 % GEL gel      Hydroactive Dressings (TEGADERM HYDROCOLLOID THIN) MISC     HYDROmorphone, STANDARD CONC, (DILAUDID) 1 MG/ML oral solution     ibuprofen (ADVIL/MOTRIN) 400 MG tablet     levothyroxine (SYNTHROID/LEVOTHROID) 137 MCG tablet     Lidocaine (LIDOCARE) 4 % Patch     methocarbamol (ROBAXIN) 750 MG tablet     montelukast (SINGULAIR) 10 MG tablet     Multiple Vitamin (MULTIVITAMIN ADULT PO)     Nutritional Supplements (BOOST HIGH PROTEIN) LIQD     omeprazole (PRILOSEC) 40 MG DR capsule     order for DME     polyethylene glycol (MIRALAX) 17 GM/Dose powder     prochlorperazine (COMPAZINE) 10 MG tablet     promethazine (PHENERGAN) 25 MG tablet     Prucalopride Succinate 2 MG TABS     scopolamine (TRANSDERM) 1 MG/3DAYS 72 hr patch     senna-docusate (SENOKOT-S;PERICOLACE) 8.6-50 MG per tablet     Sharps Container (BD SHARPS ) MISC     ZOLMitriptan (ZOMIG) 5 MG tablet     No current facility-administered medications for this visit.         PHYSICAL EXAMINATION:  Vitals Wt 64 kg (141 lb)   BMI 22.08 kg/m     Wt   Wt Readings from Last 2 Encounters:   06/08/22 64 kg (141 lb)   06/02/22 63.8 kg (140 lb 9.6 oz)      Gen: Pt sitting up in NAD, interactive and cooperative on exam  Eyes: sclerae anicteric, no injection  ENT:  MMM  Resp: Breathing comfortably on exam, speaking in full sentences  Skin: No jaundice  Neuro: alert, oriented, answers questions appropriately        PERTINENT STUDIES:    Lab on 02/17/2022   Component Date Value Ref Range Status     Lauric Acid C12 0 Test 02/17/2022 29  6 - 90 nmol/mL Final     Myristic Acid C14 0 Test 02/17/2022 158  30 - 450 nmol/mL Final     Hexadecenoic Acid Test 02/17/2022 48  25 - 105 nmol/mL Final     Palmitoleic Acid Test 02/17/2022 347  110 - 1130 nmol/mL Final     Palmitic Acid Test 02/17/2022 2534  1480 - 3730 nmol/mL Final     G Linolenic Acid Test 02/17/2022 61  16 - 150 nmol/mL Final     A linolenic Acid Test 02/17/2022 115  50 - 130 nmol/mL Final     Linoleic Acid Test  02/17/2022 4369 (A) 2270 - 3850 nmol/mL Final     Oleic Acid Test 02/17/2022 2122  650 - 3500 nmol/mL Final     Vaccenic Acid Test 02/17/2022 169 (A) 280 - 740 nmol/mL Final     Stearic Acid Test 02/17/2022 1257 (A) 590 - 1170 nmol/mL Final     EPA C20 5W3 Test 02/17/2022 157 (A) 14 - 100 nmol/mL Final     Arachidonic Acid Test 02/17/2022 1281  520 - 1490 nmol/mL Final     Novinger Acid Test 02/17/2022 38 (A) 7 - 30 nmol/mL Final     H G Linolenic Test 02/17/2022 226  50 - 250 nmol/mL Final     Arachidic Acid Test 02/17/2022 31 (A) 50 - 90 nmol/mL Final     DHA C22 6W3 Test 02/17/2022 159  30 - 250 nmol/mL Final     DPA C22 5W6 Test 02/17/2022 30  10 - 70 nmol/mL Final     DPA C22 5W3 Test 02/17/2022 99  20 - 210 nmol/mL Final     DTA C22 4W6 Test 02/17/2022 37  10 - 80 nmol/mL Final     Docosenoic Acid Test 02/17/2022 5  4 - 13 nmol/mL Final     Nervonic Acid Test 02/17/2022 111 (A) 60 - 100 nmol/mL Final     Triene Tetraene Ratio Test 02/17/2022 0.030  0.010 - 0.038 Final     Total Saturated Acid Test 02/17/2022 4.2  2.5 - 5.5 mmol/L Final     Total Monounsat Acid Test 02/17/2022 2.8  1.3 - 5.8 mmol/L Final     Total Polyunsat Acid Test 02/17/2022 6.6 (A) 3.2 - 5.8 mmol/L Final     Total W3 Test 02/17/2022 0.5  0.2 - 0.5 mmol/L Final     Total W6 Test 02/17/2022 6.0 (A) 3.0 - 5.4 mmol/L Final     Total Fatty Acids Test 02/17/2022 13.6  7.3 - 16.8 mmol/L Final     Interpretation Test 02/17/2022 SEE NOTE   Final     Glucose 02/17/2022 132 (A) 70 - 99 mg/dL Final     Patient Fasting > 8hrs? 02/17/2022 Yes   Final     C Peptide 02/17/2022 1.6  0.9 - 6.9 ng/mL Final     Hemoglobin A1C 02/17/2022 5.4  0.0 - 5.6 % Final     Vitamin A 02/17/2022 0.61  0.30 - 1.20 mg/L Final     Retinol Palmitate 02/17/2022 <0.02  0.00 - 0.10 mg/L Final     Vitamin A Interp 02/17/2022 Normal   Final     Vitamin B12 02/17/2022 616  193 - 986 pg/mL Final     Vitamin E 02/17/2022 13.6  5.5 - 18.0 mg/L Final     Vitamin E Gamma 02/17/2022 0.6  0.0  - 6.0 mg/L Final     25 OH Vitamin D2 02/17/2022 <5  ug/L Final     25 OH Vitamin D3 02/17/2022 49  ug/L Final     25 OH Vit D Total 02/17/2022 <54  20 - 75 ug/L Final     Sodium 02/17/2022 138  133 - 144 mmol/L Final     Potassium 02/17/2022 4.0  3.4 - 5.3 mmol/L Final     Chloride 02/17/2022 102  94 - 109 mmol/L Final     Carbon Dioxide (CO2) 02/17/2022 29  20 - 32 mmol/L Final     Anion Gap 02/17/2022 7  3 - 14 mmol/L Final     Urea Nitrogen 02/17/2022 15  7 - 30 mg/dL Final     Creatinine 02/17/2022 0.71  0.52 - 1.04 mg/dL Final     Calcium 02/17/2022 9.6  8.5 - 10.1 mg/dL Final     Glucose 02/17/2022 132 (A) 70 - 99 mg/dL Final     Alkaline Phosphatase 02/17/2022 98  40 - 150 U/L Final     AST 02/17/2022 23  0 - 45 U/L Final     ALT 02/17/2022 31  0 - 50 U/L Final     Protein Total 02/17/2022 7.8  6.8 - 8.8 g/dL Final     Albumin 02/17/2022 4.2  3.4 - 5.0 g/dL Final     Bilirubin Total 02/17/2022 0.9  0.2 - 1.3 mg/dL Final     GFR Estimate 02/17/2022 >90  >60 mL/min/1.73m2 Final     Prealbumin 02/17/2022 25  15 - 45 mg/dL Final     Iron 02/17/2022 91  35 - 180 ug/dL Final     Iron Binding Capacity 02/17/2022 266  240 - 430 ug/dL Final     Iron Sat Index 02/17/2022 34  15 - 46 % Final     Ferritin 02/17/2022 236  8 - 252 ng/mL Final     Folate RBC 02/17/2022 702  >=366 ng/mL Final     Zinc, Serum/Plasma 02/17/2022 70.1  60.0 - 120.0 ug/dL Final     WBC Count 02/17/2022 5.0  4.0 - 11.0 10e3/uL Final     RBC Count 02/17/2022 4.17  3.80 - 5.20 10e6/uL Final     Hemoglobin 02/17/2022 13.1  11.7 - 15.7 g/dL Final     Hematocrit 02/17/2022 40.0  35.0 - 47.0 % Final     MCV 02/17/2022 96  78 - 100 fL Final     MCH 02/17/2022 31.4  26.5 - 33.0 pg Final     MCHC 02/17/2022 32.8  31.5 - 36.5 g/dL Final     RDW 02/17/2022 13.4  10.0 - 15.0 % Final     Platelet Count 02/17/2022 389  150 - 450 10e3/uL Final     % Neutrophils 02/17/2022 54  % Final     % Lymphocytes 02/17/2022 34  % Final     % Monocytes 02/17/2022 9  % Final      % Eosinophils 02/17/2022 1  % Final     % Basophils 02/17/2022 2  % Final     % Immature Granulocytes 02/17/2022 0  % Final     NRBCs per 100 WBC 02/17/2022 0  <1 /100 Final     Absolute Neutrophils 02/17/2022 2.7  1.6 - 8.3 10e3/uL Final     Absolute Lymphocytes 02/17/2022 1.7  0.8 - 5.3 10e3/uL Final     Absolute Monocytes 02/17/2022 0.5  0.0 - 1.3 10e3/uL Final     Absolute Eosinophils 02/17/2022 0.1  0.0 - 0.7 10e3/uL Final     Absolute Basophils 02/17/2022 0.1  0.0 - 0.2 10e3/uL Final     Absolute Immature Granulocytes 02/17/2022 0.0  <=0.4 10e3/uL Final     Absolute NRBCs 02/17/2022 0.0  10e3/uL Final     Glucose 02/17/2022 174 (A) 70 - 99 mg/dL Final     Patient Fasting > 8hrs? 02/17/2022 Unknown   Final     C Peptide 02/17/2022 2.3  0.9 - 6.9 ng/mL Final     Glucose 02/17/2022 137 (A) 70 - 99 mg/dL Final     Patient Fasting > 8hrs? 02/17/2022 Unknown   Final     C Peptide 02/17/2022 2.5  0.9 - 6.9 ng/mL Final

## 2022-06-08 NOTE — LETTER
"    6/8/2022         RE: Dinora Mcghee  [address] [facility] [address]        Dear Colleague,    Thank you for referring your patient, Dinora Mcghee, to the [facility]STRO[facility]OLOG[facility] [place]. Please see a copy of my visit note below.      GI CLINIC VISIT    CC: follow-up      ASSESSMENT/PLAN:  (Z91.89) At high risk for abnormal gastrointestinal motility  (primary encounter diagnosis)  (K31.84) Gastroparesis  (R10.84) Abdominal pain, generalized  (R63.4) Weight loss  (Z94.83) Status post pancreas transplantation (H)  (K59.09) Other constipation     Severe gastroparesis, progressing -   Postprandial pain, fullness, and nausea even with infrequent vomiting despite round-the-clock scopolamine, additional antiemetics. Did have prior weight loss leading to TF re-initiation. Unfortunately had marked difficulty with G and J tubes (had previously occurred as well around time of TPIAT) though tolerated tube feeding rate reasonably well.     Has returned to weight of 140 lbs and maintained with oral diet (small frequent meals with liquid supplementation and IVF hydration). Still with some vomiting.      Need for antiemetics further complicating baseline constipation, though currently she feels she is emptying ok on her bowel regimen. We did discuss some adjustments to this as outlined below. As discussed in my prior note \"If [Motegrity] needs to be stopped, we discussed a possible re-trial of Trulance --> would do a bowel cleanout first, then start Trulance and may be able to avoid marked bowel emptying with start of med (potentially avoid nighttime incontinence which occurred during first couple days of starting Trulance in the past).\"         - needs G-tube management orders, referral back through  Home care,  - will need KISHORE-KEY exchange teaching, maybe able to arrange through [facility] - will attempt to coordinate  - let us know if G-tube issues, can refer to wound-ostomy clinic as needed  - " track relationship of food intake and type with hypoglcemic episodes - is this just starvation or reactionary hypoglycemia, continue to communicate with Dr. Shin cartwright to continue current bowel meds - mg citrate cleanout every 3-4 days   - follow-up in GI clinic in 12 weeks      It was a pleasure to participate in the care of this patient; please contact us with any further questions.  A total of 51 video face-to-face minutes was spent with this patient, >50% of which was counseling regarding the above delineated issues. An additional 19 minutes was spent on the date of the encounter doing chart review, documentation, care coordination, and further activities as noted above. Time beyond that was spent on separate days as well.     Vijaya Genao MD   of Medicine  Division of Gastroenterology, Hepatology and Nutrition  [facility]    ---------------------------------------------------------------------------------------------------  HPI:  Ms. 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 She was initially seen in general GI clinic by this writer on 11/1/2017 and has been seen by myself and ANA Gan since that time. Her pancreas txplt surgeon was Dr. Phoenix; she now follows with Dr. Honeycutt. She also follows with Dr. Park of GI in Pan[facility] Clinic. Her last visit with me was 11/3/2021, though she has been seen by my colleague Dr. Park a few times since then.     History summarized and updated from previous documentation from ANA Gan and myself. Please see previous notes for further details       Gastroparesis  Initially diagnosed years ago with 2-hour study gastric emptying study at [facility]. Repeat testing here with 4-hour GES on 6/27/2016 with 49% remaining at 4 hours (?on pain meds at the time).  This was addressed with prior GJ tube in fall 2016, NG tube used for venting. Ultimately, after TPIAT, she was able to wean off of TFs and return to a regular diet.  Patient did have some persisting nausea and vomiting which she treated over the years with various medications including scopolamine patch, prochlorperazine (most mornings), and promethazine (most evenings).  She has also been seen by neurology and psychology to assist with insomnia and with migraines (and any contribution they may have to nausea).       Patient reports that she had been feeling pretty well up until early November 2020.  At that time she was seen at the [facility] emergency department on November 18 for marked nausea and emesis.  At that time, blood work notable for normal BMP, low lipase, unremarkable hepatic panel.  CBC without elevation of WBCs though she did have elevated platelets, neutrophils and lymphocytes.  CT at that time with moderate stool burden, nonspecific fluid in small intestine.  No evidence of obstruction per documentation. Had similar episodes after this and dietary interventions were initiated as well as some work on her bowel regimen (no marked improvement after bowel cleanout). With some effort she was able to have some solid foods and focused on ingestion of 6-7 small meals a day. At some points she was on a liquid-only/juicing diet.  Initially these dietary changes seemed to help, but over time the response waned. Beyond dietary interventions for GP, she has tried metoclopramide with no response. Has not tried erythromycin or azithromycin due to anaphylactic event with prior azithromycin use (in ED in 2008).  Symptoms did not improve with bowel cleanout as mentioned above. Another course of  "rifaximin (previously successful at addressing bloating and stool issues) was not helpful for her pain, bloating/fullness, and n/v. Amitriptyline was increased to 60 mg nightly with no change in discomfort.    From winter 2020 to May/Saba she lost about 16 lbs (from 156-->140 lbs). She continued to experience more pronounced post-prandial pain and fullness and had vomiting of food after eating. Work-up for alternate causes (with SBFT, CTE, CTA) was normal.      By August 2021, Ms. Loo was open to consideration of TF restart. She had, by no fault of her own, a complicated course with her prior TFs and was, understandably, hoping to avoid it. But despite maximal efforts on her part was continuing to lose weight (down to 125 lbs at her lowest) and experienced significant discomfort with oral intake.        Initially a GJ was placed in Sept 2021, but this was replaced a few times due to J-tube coiling in stomach as well as G-tube clogging/malfunction. On 10/19/21 she had placement of a weighted GJ but continued to struggle with discomfort at insertion site, clogging/venting issues. Ultimately a direct jejunostomy was done and after struggling with this for awhile it was removed. She worked hard to return to an oral diet.     Currently, Ms. Loo has just a G-tube in place for venting.  She feels that this week has been a good week stating her gut has been \"working all week. It never worked this well before.\" She actually had hunger on Tues (noted this was in 24 hour window after IV phenergan).    She generally can tolerate a small plate/1 cup/1 bowl of food without vomiting, 70% of the time. She has 5-6 of these servings a day and estimates this is 5012-8213 Kcal. More recently she has reintroduced meat - chicken, fish occasionally. Unfortunately, ground hamburger did not go as well. Ms. Loo continues to have regular protein supplement drinks and works hard to stay hydrated (takes liquid IV brand drinks, " gatorade, water). She continues to vent and finds it helpful, though her most recent tubing is smaller caliber and thus has been clogging more frequently.  Her Creon use has been limited due to smaller meals. Overall her weight has been stable around 140 lbs, though she notes that she drops a few lbs in-between IVF infusions and weight goes back up after IV hydration.     For nausea, she continues on her chronic scopolamine patch and is still taking prochlorperazine and promethazine about every 6-8 hours.         Constipation/irregular bowel movements/bloating  Patient reports history of ongoing constipation for over 20 years after having her first child.  She has tried multiple interventions including regular bowel cleanouts, MiraLAX, senna, milk of magnesium, Linzess, and Amitiza for which she was on when she first came to  GI clinic.  Note, patient also takes creon.    Amitiza had worked for awhile then lost effectiveness. She was trialed on Trulance but reported frequent loose stools in the initial days (including nocturnal stooling and incontinence) which prompted her to stop. Trial of rifaximin in the past with with improvement in bloating and stool consistency though, as stated above, a re-trial for her pain, n/v, and fullness was not successful.    Motegrity was then started with some success, though noted HAs. We discussed a possible switch in her regimen to address this, though she was most comfortable with continuing Motegrity and monitoring her symptoms.  Previously discussed combining daily constipation medications; noted patient reports cost for Amitiza was quite a lot  (running $1000+/month).     Today, she continues on Motegrity daily as well as Miralax 1 capful BID, 4 senna a day, 1000 mg Mag citrate daily, and stool softeners. With this she typically will have no BMs for 3-4 days (though has gone anywhere from 1-8 days), then have a day of emptying where she has multiple BMs over a day. These are  "often \"enormous\" and she does feel empty when complete. Noted her bowel pattern is complicated by her need for antiemetics. When she is \"backed up\" she will experience early satiety.     GERD, Dysphagia   Summarized/taken from prior documentation - not discussed today  Patient experiences GERD as substernal and throat burning with nocturnal relaxant causing choking. She had previously followed with Dr. George Flores in out clinic and reports a prior Schatzki's ring requiring previous dilation. Manometry was noral, and pH impedence had a DeMeester of 24 with positive symptoms association. Patient has treated GERD symptoms with BID PPI, Carafate, H2 blocker, and tums. At her last visit, she had been experiencing heartburn and has restarted omeprazole 40 mg daily with continued symptoms with specific food trigger.  Had also previously been on Zantac up to 300 mg a day.        Other health issues:     Ms. Loo has recently been dealing with a bulging disc in her back and is taking robaxin 750 mg QID, but really using TID. She feels this medication has improved her abd pain as well as her back pain. She is undergoing massage therapy, dry needle therapy, and physical therapy. She does feel her pelvic and L leg numbness is improving.      She continues to recover from COVID, but has had more issues with her allergies as of late.       She is no longer on amitriptyline and has had issues with sleep maintenance insomnia.      She recently met with our GI pharmacist and discussed her medications with consideration of possible domperidone should it become available at our institution in the future.       PROBLEM LIST  Patient Active Problem List    Diagnosis Date Noted     Intra-abdominal abscess (H) 12/03/2021     Priority: Medium     Postprocedural intraabdominal abscess 12/03/2021     Priority: Medium     Gastrostomy tube obstruction (H) 11/27/2021     Priority: Medium     Abdominal pain, generalized 09/18/2021     " Priority: Medium     Adult failure to thrive 08/16/2021     Priority: Medium     Added automatically from request for surgery [id number]       Hypoglycemia 08/05/2021     Priority: Medium     Iron deficiency anemia 03/22/2019     Priority: Medium     Headache, chronic migraine without aura 09/18/2018     Priority: Medium     Chronic pain syndrome 09/18/2018     Priority: Medium     S/P hernia repair 08/23/2018     Priority: Medium     Incisional hernia, without obstruction or gangrene 05/20/2018     Priority: Medium     Adhesive capsulitis of shoulder, unspecified laterality 11/14/2017     Priority: Medium     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  Document previously scanned on 1/12/17.  Validation form completed and scanned.  Code Status reflects choices in most recent ACP document..  Confirmed/documented designated decision maker(s).  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 Provider to review and confirm.       GERD (gastroesophageal reflux disease) 12/01/2010     Priority: Medium     Lumbago 04/18/2005     Priority: Medium     History of L5-S1 degenerative disk disease.         Sensorineural hearing loss 01/10/2005     Priority: Medium     Problem list name updated by automated process. Provider to review       Vertiginous syndrome and  labyrinthine disorder 01/10/2005     Priority: Medium     Problem list name updated by automated process. Provider to review  IMO Regulatory Load OCT 2020       Sprain and strain of other specified sites of hip and thigh 12/09/2002     Priority: Medium     Chondromalacia of patella 12/09/2002     Priority: Medium     Need for prophylactic immunotherapy      Priority: Medium     trees, grass, srw, dust mites, cat       Allergic rhinitis due to other allergen 12/21/2001     Priority: Medium     Hypothyroidism      Priority: Medium     Problem list name updated by automated process. Provider to review         PERTINENT MEDICATIONS:  Current Outpatient Medications   Medication     acarbose (PRECOSE) 50 MG tablet     amylase-lipase-protease (CREON 24) 21110-91618 units CPEP per EC capsule     amylase-lipase-protease (CREON) 69291-38226 units CPEP per EC capsule     azelastine (ASTELIN) 0.1 % nasal spray     blood glucose (PAT CONTOUR NEXT) test strip     blood glucose monitoring (PAT MICROLET) lancets     cetirizine (ZYRTEC) 10 MG tablet     Continuous Blood Gluc Sensor (FREESTYLE LISA 2 SENSOR) MISC     Continuous Blood Gluc Sensor (FREESTYLE LISA 2 SENSOR) MISC     cyclobenzaprine (FLEXERIL) 10 MG tablet     diphenhydrAMINE (BENADRYL ALLERGY) 25 MG capsule     estradiol (VAGIFEM) 10 MCG TABS vaginal tablet     famotidine (PEPCID) 20 MG tablet     Glucagon (GVOKE HYPOPEN 1-PACK) 1 MG/0.2ML SOAJ     glucose 40 % GEL gel     Hydroactive Dressings (TEGADERM HYDROCOLLOID THIN) MISC     HYDROmorphone, STANDARD CONC, (DILAUDID) 1 MG/ML oral solution     ibuprofen (ADVIL/MOTRIN) 400 MG tablet     levothyroxine (SYNTHROID/LEVOTHROID) 137 MCG tablet     Lidocaine (LIDOCARE) 4 % Patch     methocarbamol (ROBAXIN) 750 MG tablet     montelukast (SINGULAIR) 10 MG tablet     Multiple Vitamin (MULTIVITAMIN ADULT PO)     Nutritional Supplements (BOOST HIGH PROTEIN) LIQD     omeprazole (PRILOSEC) 40 MG DR capsule     order for DME      polyethylene glycol (MIRALAX) 17 GM/Dose powder     prochlorperazine (COMPAZINE) 10 MG tablet     promethazine (PHENERGAN) 25 MG tablet     Prucalopride Succinate 2 MG TABS     scopolamine (TRANSDERM) 1 MG/3DAYS 72 hr patch     senna-docusate (SENOKOT-S;PERICOLACE) 8.6-50 MG per tablet     Sharps Container (BD SHARPS ) MISC     ZOLMitriptan (ZOMIG) 5 MG tablet     No current facility-administered medications for this visit.         PHYSICAL EXAMINATION:  Vitals Wt 64 kg (141 lb)   BMI 22.08 kg/m     Wt   Wt Readings from Last 2 Encounters:   06/08/22 64 kg (141 lb)   06/02/22 63.8 kg (140 lb 9.6 oz)      Gen: Pt sitting up in NAD, interactive and cooperative on exam  Eyes: sclerae anicteric, no injection  ENT:  MMM  Resp: Breathing comfortably on exam, speaking in full sentences  Skin: No jaundice  Neuro: alert, oriented, answers questions appropriately        PERTINENT STUDIES:    Lab on 02/17/2022   Component Date Value Ref Range Status     Lauric Acid C12 0 Test 02/17/2022 29  6 - 90 nmol/mL Final     Myristic Acid C14 0 Test 02/17/2022 158  30 - 450 nmol/mL Final     Hexadecenoic Acid Test 02/17/2022 48  25 - 105 nmol/mL Final     Palmitoleic Acid Test 02/17/2022 347  110 - 1130 nmol/mL Final     Palmitic Acid Test 02/17/2022 2534  1480 - 3730 nmol/mL Final     G Linolenic Acid Test 02/17/2022 61  16 - 150 nmol/mL Final     A linolenic Acid Test 02/17/2022 115  50 - 130 nmol/mL Final     Linoleic Acid Test 02/17/2022 4369 (A) 2270 - 3850 nmol/mL Final     Oleic Acid Test 02/17/2022 2122  650 - 3500 nmol/mL Final     Vaccenic Acid Test 02/17/2022 169 (A) 280 - 740 nmol/mL Final     Stearic Acid Test 02/17/2022 1257 (A) 590 - 1170 nmol/mL Final     EPA C20 5W3 Test 02/17/2022 157 (A) 14 - 100 nmol/mL Final     Arachidonic Acid Test 02/17/2022 1281  520 - 1490 nmol/mL Final     [place] Acid Test 02/17/2022 38 (A) 7 - 30 nmol/mL Final     H G Linolenic Test 02/17/2022 226  50 - 250 nmol/mL Final      Arachidic Acid Test 02/17/2022 31 (A) 50 - 90 nmol/mL Final     DHA C22 6W3 Test 02/17/2022 159  30 - 250 nmol/mL Final     DPA C22 5W6 Test 02/17/2022 30  10 - 70 nmol/mL Final     DPA C22 5W3 Test 02/17/2022 99  20 - 210 nmol/mL Final     DTA C22 4W6 Test 02/17/2022 37  10 - 80 nmol/mL Final     Docosenoic Acid Test 02/17/2022 5  4 - 13 nmol/mL Final     Nervonic Acid Test 02/17/2022 111 (A) 60 - 100 nmol/mL Final     Triene Tetraene Ratio Test 02/17/2022 0.030  0.010 - 0.038 Final     Total Saturated Acid Test 02/17/2022 4.2  2.5 - 5.5 mmol/L Final     Total Monounsat Acid Test 02/17/2022 2.8  1.3 - 5.8 mmol/L Final     Total Polyunsat Acid Test 02/17/2022 6.6 (A) 3.2 - 5.8 mmol/L Final     Total W3 Test 02/17/2022 0.5  0.2 - 0.5 mmol/L Final     Total W6 Test 02/17/2022 6.0 (A) 3.0 - 5.4 mmol/L Final     Total Fatty Acids Test 02/17/2022 13.6  7.3 - 16.8 mmol/L Final     Interpretation Test 02/17/2022 SEE NOTE   Final     Glucose 02/17/2022 132 (A) 70 - 99 mg/dL Final     Patient Fasting > 8hrs? 02/17/2022 Yes   Final     C Peptide 02/17/2022 1.6  0.9 - 6.9 ng/mL Final     Hemoglobin A1C 02/17/2022 5.4  0.0 - 5.6 % Final     Vitamin A 02/17/2022 0.61  0.30 - 1.20 mg/L Final     Retinol Palmitate 02/17/2022 <0.02  0.00 - 0.10 mg/L Final     Vitamin A Interp 02/17/2022 Normal   Final     Vitamin B12 02/17/2022 616  193 - 986 pg/mL Final     Vitamin E 02/17/2022 13.6  5.5 - 18.0 mg/L Final     Vitamin E Gamma 02/17/2022 0.6  0.0 - 6.0 mg/L Final     25 OH Vitamin D2 02/17/2022 <5  ug/L Final     25 OH Vitamin D3 02/17/2022 49  ug/L Final     25 OH Vit D Total 02/17/2022 <54  20 - 75 ug/L Final     Sodium 02/17/2022 138  133 - 144 mmol/L Final     Potassium 02/17/2022 4.0  3.4 - 5.3 mmol/L Final     Chloride 02/17/2022 102  94 - 109 mmol/L Final     Carbon Dioxide (CO2) 02/17/2022 29  20 - 32 mmol/L Final     Anion Gap 02/17/2022 7  3 - 14 mmol/L Final     Urea Nitrogen 02/17/2022 15  7 - 30 mg/dL Final      Creatinine 02/17/2022 0.71  0.52 - 1.04 mg/dL Final     Calcium 02/17/2022 9.6  8.5 - 10.1 mg/dL Final     Glucose 02/17/2022 132 (A) 70 - 99 mg/dL Final     Alkaline Phosphatase 02/17/2022 98  40 - 150 U/L Final     AST 02/17/2022 23  0 - 45 U/L Final     ALT 02/17/2022 31  0 - 50 U/L Final     Protein Total 02/17/2022 7.8  6.8 - 8.8 g/dL Final     Albumin 02/17/2022 4.2  3.4 - 5.0 g/dL Final     Bilirubin Total 02/17/2022 0.9  0.2 - 1.3 mg/dL Final     GFR Estimate 02/17/2022 >90  >60 mL/min/1.73m2 Final     Prealbumin 02/17/2022 25  15 - 45 mg/dL Final     Iron 02/17/2022 91  35 - 180 ug/dL Final     Iron Binding Capacity 02/17/2022 266  240 - 430 ug/dL Final     Iron Sat Index 02/17/2022 34  15 - 46 % Final     Ferritin 02/17/2022 236  8 - 252 ng/mL Final     Folate RBC 02/17/2022 702  >=366 ng/mL Final     Zinc, Serum/Plasma 02/17/2022 70.1  60.0 - 120.0 ug/dL Final     WBC Count 02/17/2022 5.0  4.0 - 11.0 10e3/uL Final     RBC Count 02/17/2022 4.17  3.80 - 5.20 10e6/uL Final     Hemoglobin 02/17/2022 13.1  11.7 - 15.7 g/dL Final     Hematocrit 02/17/2022 40.0  35.0 - 47.0 % Final     MCV 02/17/2022 96  78 - 100 fL Final     MCH 02/17/2022 31.4  26.5 - 33.0 pg Final     MCHC 02/17/2022 32.8  31.5 - 36.5 g/dL Final     RDW 02/17/2022 13.4  10.0 - 15.0 % Final     Platelet Count 02/17/2022 389  150 - 450 10e3/uL Final     % Neutrophils 02/17/2022 54  % Final     % Lymphocytes 02/17/2022 34  % Final     % Monocytes 02/17/2022 9  % Final     % Eosinophils 02/17/2022 1  % Final     % Basophils 02/17/2022 2  % Final     % Immature Granulocytes 02/17/2022 0  % Final     NRBCs per 100 WBC 02/17/2022 0  <1 /100 Final     Absolute Neutrophils 02/17/2022 2.7  1.6 - 8.3 10e3/uL Final     Absolute Lymphocytes 02/17/2022 1.7  0.8 - 5.3 10e3/uL Final     Absolute Monocytes 02/17/2022 0.5  0.0 - 1.3 10e3/uL Final     Absolute Eosinophils 02/17/2022 0.1  0.0 - 0.7 10e3/uL Final     Absolute Basophils 02/17/2022 0.1  0.0 - 0.2  10e3/uL Final     Absolute Immature Granulocytes 02/17/2022 0.0  <=0.4 10e3/uL Final     Absolute NRBCs 02/17/2022 0.0  10e3/uL Final     Glucose 02/17/2022 174 (A) 70 - 99 mg/dL Final     Patient Fasting > 8hrs? 02/17/2022 Unknown   Final     C Peptide 02/17/2022 2.3  0.9 - 6.9 ng/mL Final     Glucose 02/17/2022 137 (A) 70 - 99 mg/dL Final     Patient Fasting > 8hrs? 02/17/2022 Unknown   Final     C Peptide 02/17/2022 2.5  0.9 - 6.9 ng/mL Final           Sincerely,    Vijaya Genao MD

## 2022-06-10 ENCOUNTER — HOSPITAL ENCOUNTER (OUTPATIENT)
Facility: CLINIC | Age: 57
Discharge: HOME | End: 2022-06-10
Attending: INTERNAL MEDICINE | Admitting: PHYSICIAN ASSISTANT
Payer: COMMERCIAL

## 2022-06-10 ENCOUNTER — HOME INFUSION (PRE-WILLOW HOME INFUSION) (OUTPATIENT)
Dept: PHARMACY | Facility: CLINIC | Age: 57
End: 2022-06-10

## 2022-06-10 ENCOUNTER — APPOINTMENT (OUTPATIENT)
Dept: MEDSURG UNIT | Facility: CLINIC | Age: 57
End: 2022-06-10
Attending: INTERNAL MEDICINE
Payer: COMMERCIAL

## 2022-06-10 ENCOUNTER — APPOINTMENT (OUTPATIENT)
Dept: INTERVENTIONAL RADIOLOGY/VASCULAR | Facility: CLINIC | Age: 57
End: 2022-06-10
Attending: INTERNAL MEDICINE
Payer: COMMERCIAL

## 2022-06-10 VITALS
TEMPERATURE: 98 F | HEIGHT: 68 IN | RESPIRATION RATE: 16 BRPM | SYSTOLIC BLOOD PRESSURE: 122 MMHG | OXYGEN SATURATION: 98 % | DIASTOLIC BLOOD PRESSURE: 85 MMHG | HEART RATE: 80 BPM | BODY MASS INDEX: 21.37 KG/M2 | WEIGHT: 141 LBS

## 2022-06-10 DIAGNOSIS — K31.84 GASTROPARESIS: Primary | ICD-10-CM

## 2022-06-10 DIAGNOSIS — K31.84 GASTROPARESIS: ICD-10-CM

## 2022-06-10 DIAGNOSIS — Z90.410 HISTORY OF PANCREATECTOMY: ICD-10-CM

## 2022-06-10 DIAGNOSIS — R10.13 EPIGASTRIC PAIN: ICD-10-CM

## 2022-06-10 LAB
ANION GAP SERPL CALCULATED.3IONS-SCNC: 8 MMOL/L (ref 3–14)
BUN SERPL-MCNC: 12 MG/DL (ref 7–30)
CALCIUM SERPL-MCNC: 9 MG/DL (ref 8.5–10.1)
CHLORIDE BLD-SCNC: 108 MMOL/L (ref 94–109)
CO2 SERPL-SCNC: 26 MMOL/L (ref 20–32)
CREAT SERPL-MCNC: 0.58 MG/DL (ref 0.52–1.04)
ERYTHROCYTE [DISTWIDTH] IN BLOOD BY AUTOMATED COUNT: 14 % (ref 10–15)
GFR SERPL CREATININE-BSD FRML MDRD: >90 ML/MIN/1.73M2
GLUCOSE BLD-MCNC: 128 MG/DL (ref 70–99)
HCT VFR BLD AUTO: 38.4 % (ref 35–47)
HGB BLD-MCNC: 12.5 G/DL (ref 11.7–15.7)
INR PPP: 1.03 (ref 0.85–1.15)
MCH RBC QN AUTO: 31.3 PG (ref 26.5–33)
MCHC RBC AUTO-ENTMCNC: 32.6 G/DL (ref 31.5–36.5)
MCV RBC AUTO: 96 FL (ref 78–100)
PLATELET # BLD AUTO: 391 10E3/UL (ref 150–450)
POTASSIUM BLD-SCNC: 4.1 MMOL/L (ref 3.4–5.3)
RBC # BLD AUTO: 3.99 10E6/UL (ref 3.8–5.2)
SODIUM SERPL-SCNC: 142 MMOL/L (ref 133–144)
WBC # BLD AUTO: 6.3 10E3/UL (ref 4–11)

## 2022-06-10 PROCEDURE — 36561 INSERT TUNNELED CV CATH: CPT | Mod: RT | Performed by: PHYSICIAN ASSISTANT

## 2022-06-10 PROCEDURE — 96365 THER/PROPH/DIAG IV INF INIT: CPT

## 2022-06-10 PROCEDURE — 99153 MOD SED SAME PHYS/QHP EA: CPT

## 2022-06-10 PROCEDURE — 99152 MOD SED SAME PHYS/QHP 5/>YRS: CPT | Performed by: PHYSICIAN ASSISTANT

## 2022-06-10 PROCEDURE — 36561 INSERT TUNNELED CV CATH: CPT

## 2022-06-10 PROCEDURE — C1788 PORT, INDWELLING, IMP: HCPCS

## 2022-06-10 PROCEDURE — 250N000011 HC RX IP 250 OP 636: Performed by: PHYSICIAN ASSISTANT

## 2022-06-10 PROCEDURE — 85014 HEMATOCRIT: CPT | Performed by: NURSE PRACTITIONER

## 2022-06-10 PROCEDURE — 76937 US GUIDE VASCULAR ACCESS: CPT | Mod: 26 | Performed by: PHYSICIAN ASSISTANT

## 2022-06-10 PROCEDURE — 96374 THER/PROPH/DIAG INJ IV PUSH: CPT

## 2022-06-10 PROCEDURE — 85610 PROTHROMBIN TIME: CPT | Performed by: NURSE PRACTITIONER

## 2022-06-10 PROCEDURE — 76937 US GUIDE VASCULAR ACCESS: CPT

## 2022-06-10 PROCEDURE — 272N000602 HC WOUND GLUE CR1

## 2022-06-10 PROCEDURE — 77001 FLUOROGUIDE FOR VEIN DEVICE: CPT | Mod: 26 | Performed by: PHYSICIAN ASSISTANT

## 2022-06-10 PROCEDURE — 99152 MOD SED SAME PHYS/QHP 5/>YRS: CPT

## 2022-06-10 PROCEDURE — 250N000011 HC RX IP 250 OP 636: Performed by: NURSE PRACTITIONER

## 2022-06-10 PROCEDURE — 272N000504 HC NEEDLE CR4

## 2022-06-10 PROCEDURE — 999N000132 HC STATISTIC PP CARE STAGE 1

## 2022-06-10 PROCEDURE — 258N000003 HC RX IP 258 OP 636: Performed by: PHYSICIAN ASSISTANT

## 2022-06-10 PROCEDURE — C1769 GUIDE WIRE: HCPCS

## 2022-06-10 PROCEDURE — 250N000009 HC RX 250: Performed by: PHYSICIAN ASSISTANT

## 2022-06-10 PROCEDURE — 258N000003 HC RX IP 258 OP 636: Performed by: NURSE PRACTITIONER

## 2022-06-10 PROCEDURE — 80048 BASIC METABOLIC PNL TOTAL CA: CPT | Performed by: NURSE PRACTITIONER

## 2022-06-10 PROCEDURE — 999N000142 HC STATISTIC PROCEDURE PREP ONLY

## 2022-06-10 PROCEDURE — 36415 COLL VENOUS BLD VENIPUNCTURE: CPT | Performed by: NURSE PRACTITIONER

## 2022-06-10 RX ORDER — HEPARIN SODIUM (PORCINE) LOCK FLUSH IV SOLN 100 UNIT/ML 100 UNIT/ML
5-10 SOLUTION INTRAVENOUS
Status: DISCONTINUED | OUTPATIENT
Start: 2022-06-10 | End: 2022-06-10 | Stop reason: HOSPADM

## 2022-06-10 RX ORDER — FLUMAZENIL 0.1 MG/ML
0.2 INJECTION, SOLUTION INTRAVENOUS
Status: DISCONTINUED | OUTPATIENT
Start: 2022-06-10 | End: 2022-06-10 | Stop reason: HOSPADM

## 2022-06-10 RX ORDER — LIDOCAINE HYDROCHLORIDE 10 MG/ML
1-30 INJECTION, SOLUTION EPIDURAL; INFILTRATION; INTRACAUDAL; PERINEURAL
Status: CANCELLED | OUTPATIENT
Start: 2022-06-10

## 2022-06-10 RX ORDER — SODIUM CHLORIDE 9 MG/ML
INJECTION, SOLUTION INTRAVENOUS CONTINUOUS
Status: DISCONTINUED | OUTPATIENT
Start: 2022-06-10 | End: 2022-06-10 | Stop reason: HOSPADM

## 2022-06-10 RX ORDER — HEPARIN SODIUM,PORCINE 10 UNIT/ML
5-10 VIAL (ML) INTRAVENOUS
Status: DISCONTINUED | OUTPATIENT
Start: 2022-06-10 | End: 2022-06-10 | Stop reason: HOSPADM

## 2022-06-10 RX ORDER — HEPARIN SODIUM,PORCINE 10 UNIT/ML
5-10 VIAL (ML) INTRAVENOUS EVERY 24 HOURS
Status: DISCONTINUED | OUTPATIENT
Start: 2022-06-10 | End: 2022-06-10 | Stop reason: HOSPADM

## 2022-06-10 RX ORDER — DIPHENHYDRAMINE HYDROCHLORIDE 50 MG/ML
25 INJECTION INTRAMUSCULAR; INTRAVENOUS ONCE
Status: COMPLETED | OUTPATIENT
Start: 2022-06-10 | End: 2022-06-10

## 2022-06-10 RX ORDER — FENTANYL CITRATE 50 UG/ML
25-50 INJECTION, SOLUTION INTRAMUSCULAR; INTRAVENOUS EVERY 5 MIN PRN
Status: DISCONTINUED | OUTPATIENT
Start: 2022-06-10 | End: 2022-06-10 | Stop reason: HOSPADM

## 2022-06-10 RX ORDER — HEPARIN SODIUM (PORCINE) LOCK FLUSH IV SOLN 100 UNIT/ML 100 UNIT/ML
5 SOLUTION INTRAVENOUS
Status: COMPLETED | OUTPATIENT
Start: 2022-06-10 | End: 2022-06-10

## 2022-06-10 RX ORDER — NALOXONE HYDROCHLORIDE 0.4 MG/ML
0.4 INJECTION, SOLUTION INTRAMUSCULAR; INTRAVENOUS; SUBCUTANEOUS
Status: DISCONTINUED | OUTPATIENT
Start: 2022-06-10 | End: 2022-06-10 | Stop reason: HOSPADM

## 2022-06-10 RX ORDER — NALOXONE HYDROCHLORIDE 0.4 MG/ML
0.2 INJECTION, SOLUTION INTRAMUSCULAR; INTRAVENOUS; SUBCUTANEOUS
Status: DISCONTINUED | OUTPATIENT
Start: 2022-06-10 | End: 2022-06-10 | Stop reason: HOSPADM

## 2022-06-10 RX ORDER — CLINDAMYCIN PHOSPHATE 900 MG/50ML
900 INJECTION, SOLUTION INTRAVENOUS
Status: COMPLETED | OUTPATIENT
Start: 2022-06-10 | End: 2022-06-10

## 2022-06-10 RX ORDER — LIDOCAINE 40 MG/G
CREAM TOPICAL
Status: DISCONTINUED | OUTPATIENT
Start: 2022-06-10 | End: 2022-06-10 | Stop reason: HOSPADM

## 2022-06-10 RX ADMIN — PROMETHAZINE HYDROCHLORIDE 25 MG: 25 INJECTION INTRAMUSCULAR; INTRAVENOUS at 12:19

## 2022-06-10 RX ADMIN — FENTANYL CITRATE 25 MCG: 50 INJECTION, SOLUTION INTRAMUSCULAR; INTRAVENOUS at 11:22

## 2022-06-10 RX ADMIN — MIDAZOLAM HYDROCHLORIDE 1 MG: 1 INJECTION, SOLUTION INTRAMUSCULAR; INTRAVENOUS at 11:26

## 2022-06-10 RX ADMIN — DIPHENHYDRAMINE HYDROCHLORIDE 25 MG: 50 INJECTION, SOLUTION INTRAMUSCULAR; INTRAVENOUS at 12:19

## 2022-06-10 RX ADMIN — MIDAZOLAM HYDROCHLORIDE 1 MG: 1 INJECTION, SOLUTION INTRAMUSCULAR; INTRAVENOUS at 11:05

## 2022-06-10 RX ADMIN — CLINDAMYCIN IN 5 PERCENT DEXTROSE 900 MG: 18 INJECTION, SOLUTION INTRAVENOUS at 09:09

## 2022-06-10 RX ADMIN — MIDAZOLAM HYDROCHLORIDE 0.5 MG: 1 INJECTION, SOLUTION INTRAMUSCULAR; INTRAVENOUS at 11:21

## 2022-06-10 RX ADMIN — FENTANYL CITRATE 50 MCG: 50 INJECTION, SOLUTION INTRAMUSCULAR; INTRAVENOUS at 11:26

## 2022-06-10 RX ADMIN — FENTANYL CITRATE 50 MCG: 50 INJECTION, SOLUTION INTRAMUSCULAR; INTRAVENOUS at 11:04

## 2022-06-10 RX ADMIN — LIDOCAINE HYDROCHLORIDE 13 ML: 10 INJECTION, SOLUTION EPIDURAL; INFILTRATION; INTRACAUDAL; PERINEURAL at 11:31

## 2022-06-10 RX ADMIN — SODIUM CHLORIDE: 9 INJECTION, SOLUTION INTRAVENOUS at 09:08

## 2022-06-10 RX ADMIN — Medication 5 ML: at 11:31

## 2022-06-10 RX ADMIN — FENTANYL CITRATE 25 MCG: 50 INJECTION, SOLUTION INTRAMUSCULAR; INTRAVENOUS at 11:15

## 2022-06-10 RX ADMIN — MIDAZOLAM HYDROCHLORIDE 0.5 MG: 1 INJECTION, SOLUTION INTRAMUSCULAR; INTRAVENOUS at 11:15

## 2022-06-10 NOTE — DISCHARGE INSTRUCTIONS
[facility]        Interventional Radiology  Discharge Instructions  Following Port Placement    Your Port has been closed with  Absorbable suture  If after 10 days there are visible suture they may be trimmed by your primary doctor  Derma Conde (Skin Glue)  Do not apply any ointments over site  Do not cover with any dressing  This thin layer will slough off in 7-10 days  May gently remove Derma Conde in 10 days if still present    If there is any oozing or bleeding from the site, apply direct pressure for 5-10 minutes with a gauze pad.  If bleeding continues after 10 minutes call your primary doctor.  If bleeding cannot be controlled with direct pressure, call 911.    Call your Doctor if:  Bleeding as above  Swelling in your neck or arm  Sudden onset of Shortness of Breath, Lightheadedness, Palpitations.  Fever greater than 100.5  F  Other signs of infection such as, redness, tenderness, or drainage from the wound    If you were given sedation:  We recommend an adult stay with you for the first 24 hours.  No driving or alcoholic beverages for 24 hours.    Discharge Booklet given    ADDITIONAL INSTRUCTIONS: May use ice packs 3-4 times a day for 15 minutes for minor swelling or pain  No heavy lifting greater than 10 lbs. for one week  No tub bath, hot tub, or swimming until Derma Conde (Skin Glue) is removed  It is OK to shower and get the incision wet but immediately pat it dry  No Emla Cream to Port site for 1 week.  If you are on Coumadin, restart tonight.  Follow up with your Coumadin Clinic or Primary Care MD to have your INR rechecked.    [facility] INTERVENTIONAL RADIOLOGY DEPARTMENT  Procedure Physician:  Angela Man PA-C   Date of procedure: Saba 10, 2022  Telephone Numbers: [phone] Monday-Friday 8:00 am to 4:30 pm  [phone] After 4:30 pm Monday-Friday, Weekends & Holidays.   Ask for the Interventional Radiologist on call.  Someone is on call 24 hrs/day  [facility] toll free  number: [phone] Monday-Friday 8:00 am to 4:30 pm  [facility] Emergency Dept: [phone]

## 2022-06-10 NOTE — PROGRESS NOTES
Patient arrived on 2A for port placement.  IV placed, labs sent, antibiotic infusing.  Patient did chlorhexidine scrub last night and this morning.  Awaiting consent, otherwise prep complete.

## 2022-06-10 NOTE — IR NOTE
Patient Name: Dinora Mcghee  Medical Record Number: [id number]  Today's Date: 6/10/2022    Procedure: image guided chest port placement  Proceduralist: Angela Man PA-C  Sedation start time: 1102  Sedation end time: 1137  Sedation medications administered: 3 mg versed, 150 mcg fentanyl  Total sedation time: 35 min     Procedure start time: 1102  Procedure end time: 1137    Report given to: Lexi ANDERSON [id number]      Other Notes: Pt arrived to IR room 2 from . Consent reviewed, pt confirmed. Pt denies any questions or concerns regarding procedure. Pt positioned supine and monitored per protocol. port site cleansed and dressed per protocol. Pt tolerated procedure without any noted complications. Pt transferred back to .    Tania Bear RN on 6/10/2022 at 11:56 AM

## 2022-06-10 NOTE — PROCEDURES
[facility]    Procedure: IR Procedure Note    Date/Time: 6/10/2022 11:41 AM  Performed by: Angela Man PA-C  Authorized by: Angela Man PA-C       UNIVERSAL PROTOCOL   Site Marked: NA  Prior Images Obtained and Reviewed:  Yes  Required items: Required blood products, implants, devices and special equipment available    Patient identity confirmed:  Verbally with patient, arm band, provided demographic data and hospital-assigned identification number  Patient was reevaluated immediately before administering moderate or deep sedation or anesthesia  Confirmation Checklist:  Patient's identity using two indicators, relevant allergies, procedure was appropriate and matched the consent or emergent situation and correct equipment/implants were available  Time out: Immediately prior to the procedure a time out was called    Universal Protocol: the Joint Commission Universal Protocol was followed    Preparation: Patient was prepped and draped in usual sterile fashion       ANESTHESIA    Anesthesia: Local infiltration  Local Anesthetic:  Lidocaine 1% without epinephrine      SEDATION  Patient Sedated: Yes    Sedation:  Fentanyl and midazolam  Vital signs: Vital signs monitored during sedation    See dictated procedure note for full details.  Findings: Sedation start time: 1102  Sedation end time: 1137  Sedation medications administered: 3 mg versed, 150 mcg fentanyl    Specimens: none    Complications: None    Condition: Stable    Plan: Transport to  for recovery       PROCEDURE  Describe Procedure: Completed image-guided placement of 8 [place]an 20 cm single lumen power-injectable central venous chest port via right internal jugular vein with tip in high RA. Aspirates and flushes freely, heparin locked and is ready for immediate use.    Patient Tolerance:  Patient tolerated the procedure well with no immediate complications  Length of time physician/provider present for 1:1  monitoring during sedation: 30 (35)

## 2022-06-10 NOTE — PROGRESS NOTES
Patient returned to 2A post port placement.  VSS.  Denies pain.  Right chest and right low neck sites C/D/I, no hematomas.  PO food and fluids at bedside.  1 hr monitoring, patient and spouse aware.

## 2022-06-10 NOTE — PROGRESS NOTES
Patient tolerated recovery stage well. VSS, right neck/chest sites clean/dry/intact, no hematomas, and denies pain.  IV Phenergan and IV Benadryl given for patient's chronic nausea. Patient tolerated PO food and fluids. Teaching was done and discharge instructions were given. Patient ambulated, voided, and PIV was removed. Patient discharged from the hospital via wheel chair to home with  @ 1305.

## 2022-06-13 ENCOUNTER — HOME INFUSION (PRE-WILLOW HOME INFUSION) (OUTPATIENT)
Dept: PHARMACY | Facility: CLINIC | Age: 57
End: 2022-06-13
Payer: COMMERCIAL

## 2022-06-14 NOTE — PROGRESS NOTES
This is a recent snapshot of the patient's [facility] medical record.  For current drug dose and complete information and questions, call [phone] or In Basket pool, fv home infusion ([id number])  CSN Number:  [id number]

## 2022-06-14 NOTE — PROGRESS NOTES
This is a recent snapshot of the patient's [facility] medical record.  For current drug dose and complete information and questions, call [phone] or In Basket pool, fv home infusion ([id number])  CSN Number:  [id number]

## 2022-06-16 ENCOUNTER — PATIENT OUTREACH (OUTPATIENT)
Dept: GASTROENTEROLOGY | Facility: CLINIC | Age: 57
End: 2022-06-16
Payer: COMMERCIAL

## 2022-06-16 ENCOUNTER — TELEPHONE (OUTPATIENT)
Dept: WOUND CARE | Facility: CLINIC | Age: 57
End: 2022-06-16
Payer: COMMERCIAL

## 2022-06-16 DIAGNOSIS — Z46.59 ENCOUNTER FOR CARE RELATED TO FEEDING TUBE: Primary | ICD-10-CM

## 2022-06-16 NOTE — TELEPHONE ENCOUNTER
LVM with pod numbers and sent mychart for scheduling with Aleyda, next available, per Dr. Park for pain/redness at gtube site.

## 2022-06-16 NOTE — TELEPHONE ENCOUNTER
Pt c/o redness and pain around gtube site, requesting WOC visit, referral placed, message sent to clinic coordinators.    ML

## 2022-06-20 ENCOUNTER — OFFICE VISIT (OUTPATIENT)
Dept: WOUND CARE | Facility: CLINIC | Age: 57
End: 2022-06-20
Payer: COMMERCIAL

## 2022-06-20 DIAGNOSIS — Z46.59 ENCOUNTER FOR CARE RELATED TO FEEDING TUBE: Primary | ICD-10-CM

## 2022-06-20 PROCEDURE — 99024 POSTOP FOLLOW-UP VISIT: CPT

## 2022-06-20 NOTE — PROGRESS NOTES
Dinora Mcghee comes into clinic today at the request of Dr Montoya and ordering provider for evaluation of Gtube.    Subjective : pt states that Pain: reports pain. 9/10, Drainage: scant, Feedings:N/A used for venting  Pt presents to clinic for pain related to Percutaneous gastric tube (PEG)- pre-existing    Objective:  Tube was placed on 11/28/2021 and is intact.  An 18F 2.0c MK low profile gastrostomy tube and the balloon inflated with 6cc of water. Tube has internal balloon.  Flange is positioned with the appropriate tightness with a piece of gauze underneath the flange  Drainage from tube site: scant serous  The skin does  not present with  hypergranulation and  does  have minimal erythema around the tube site.       Subjective,  She states the pain is from the tube moving in and out with parastalsis which caused blisters last week with increased humidity.   Treatment and Education :     Reviewed basic instructions which she has been doing. She has had a feeding tube gJ from 2016 to 2017 with none from 2/2017 until 9/2021.     Keep bumper snug to the skin on a soft gauze.    Change the gauze it get saturated and replace gauze daily.     She is applying 5% Lidocain which works for about 45 minutes.    Ok to reapply as needed.    Continue with zinc ointment with gauze changes is probably the most effective in keeping skin protected from bile    Plan: Recommend she speaks with  Dr Mahajan if she might be a candidate for Morphine gel for more effectiveness.   Follow up as needed. Dr. Hightower was available for supervision of care if needed or if questions should arise and regarding plan of care. Aleyda Ceja RN CWON

## 2022-06-20 NOTE — PATIENT INSTRUCTIONS
Discussed tube care with pt:  Flange is positioned with the appropriate tightness and pt has a piece of gauze underneath the flange  Fastened tube with tape the body.   Keep tube attached to the body at all times to prevent it from swinging around.   Hypergranulation (which causes drainage and pain) is caused due by movement of the tube. Make sure flange is snug to the skin on a soft gauze.  Discussed to change the gauze as often as possible to absorb the drainage and replace wet gauze with clean dry gauze. Depending on the drainage,  that may be every hour which is very effective and can improve the skin within a week.  Zinc ointment with gauze changes is probably the most effective.  Some patients find that zinc ointment messy and prefer just changing a wet gauze to a dry gauze more often, which will be most effective without the mess.  If skin has hypergranulation and  erythemetous tissue around the tube site, this may be treated with Silver nitrate as indicated.   Pt can expect some gray discolored drainage and reiterated to keep small amount of gauze underneath the flange at all times.

## 2022-06-25 ENCOUNTER — HEALTH MAINTENANCE LETTER (OUTPATIENT)
Age: 57
End: 2022-06-25

## 2022-06-27 ENCOUNTER — HOME INFUSION (PRE-WILLOW HOME INFUSION) (OUTPATIENT)
Dept: PHARMACY | Facility: CLINIC | Age: 57
End: 2022-06-27

## 2022-06-28 DIAGNOSIS — E16.2 HYPOGLYCEMIA: Primary | ICD-10-CM

## 2022-06-29 ENCOUNTER — HOME INFUSION (PRE-WILLOW HOME INFUSION) (OUTPATIENT)
Dept: PHARMACY | Facility: CLINIC | Age: 57
End: 2022-06-29

## 2022-06-29 NOTE — PROGRESS NOTES
This is a recent snapshot of the patient's [facility] medical record.  For current drug dose and complete information and questions, call [phone] or In Basket pool, fv home infusion ([id number])  CSN Number:  [id number]

## 2022-06-30 DIAGNOSIS — E16.2 HYPOGLYCEMIA: ICD-10-CM

## 2022-06-30 DIAGNOSIS — E89.1 POST-PANCREATECTOMY DIABETES (H): Primary | ICD-10-CM

## 2022-06-30 DIAGNOSIS — Z90.410 POST-PANCREATECTOMY DIABETES (H): Primary | ICD-10-CM

## 2022-06-30 DIAGNOSIS — E13.9 POST-PANCREATECTOMY DIABETES (H): Primary | ICD-10-CM

## 2022-07-01 NOTE — PROGRESS NOTES
This is a recent snapshot of the patient's [facility] medical record.  For current drug dose and complete information and questions, call [phone] or In Basket pool, fv home infusion ([id number])  CSN Number:  [id number]

## 2022-07-03 ENCOUNTER — HOME INFUSION (PRE-WILLOW HOME INFUSION) (OUTPATIENT)
Dept: PHARMACY | Facility: CLINIC | Age: 57
End: 2022-07-03

## 2022-07-05 ENCOUNTER — TELEPHONE (OUTPATIENT)
Dept: GASTROENTEROLOGY | Facility: CLINIC | Age: 57
End: 2022-07-05

## 2022-07-05 ENCOUNTER — HOME INFUSION (PRE-WILLOW HOME INFUSION) (OUTPATIENT)
Dept: PHARMACY | Facility: CLINIC | Age: 57
End: 2022-07-05

## 2022-07-05 NOTE — TELEPHONE ENCOUNTER
[facility] Call Center    Phone Message    May a detailed message be left on voicemail: yes     Reason for Call: Order(s): Other:   Reason for requested: Patient wants to de-access her own port and would like a nurse to teach her how to do this.  Date needed: ASAP  Provider name: Dr. Park      Action Taken: Message routed to:  [facility] ([facility]): Hanane Westfall    Travel Screening: Not Applicable

## 2022-07-06 NOTE — PROGRESS NOTES
This is a recent snapshot of the patient's [facility] medical record.  For current drug dose and complete information and questions, call [phone] or In Basket pool, fv home infusion ([id number])  CSN Number:  [id number]

## 2022-07-07 NOTE — PROGRESS NOTES
This is a recent snapshot of the patient's [facility] medical record.  For current drug dose and complete information and questions, call [phone] or In Basket pool, fv home infusion ([id number])  CSN Number:  [id number]

## 2022-07-09 NOTE — PATIENT INSTRUCTIONS
- needs G-tube management orders, referral back through  Home care,  - will need KISHORE-KEY exchange teaching, maybe able to arrange through [facility] - will attempt to coordinate  - let us know if G-tube issues, can refer to wound-ostomy clinic as needed  - track relationship of food intake and type with hypoglcemic episodes - is this just starvation or reactionary hypoglycemia, continue to communicate with Dr. Shin cartwright to continue current bowel meds - mg citrate cleanout every 3-4 days   - follow-up in GI clinic in 12 weeks

## 2022-07-10 ENCOUNTER — HOME INFUSION (PRE-WILLOW HOME INFUSION) (OUTPATIENT)
Dept: PHARMACY | Facility: CLINIC | Age: 57
End: 2022-07-10

## 2022-07-12 NOTE — PROGRESS NOTES
This is a recent snapshot of the patient's [facility] medical record.  For current drug dose and complete information and questions, call [phone] or In Basket pool, fv home infusion ([id number])  CSN Number:  [id number]

## 2022-07-13 ENCOUNTER — TELEPHONE (OUTPATIENT)
Dept: TRANSPLANT | Facility: CLINIC | Age: 57
End: 2022-07-13

## 2022-07-13 NOTE — TELEPHONE ENCOUNTER
Spoke to Dinora who was recovering from a Migraine and very nausea. I asked her about her hypoglycemia and if she had tried the micro dosing  with Glucagon. She has and reports that is was effective and has done this twice. She was going to contact GI to see if she can use additional phenergan for her ongoing nausea.

## 2022-07-23 NOTE — TELEPHONE ENCOUNTER
7/29/2022-Voicemail left for patient asking for any external records or images-MR @ 912am    FUTURE VISIT INFORMATION      FUTURE VISIT INFORMATION:    Date: 8/3/2022    Time: 1pm    [place] [facility] [place]FORMATION:    Referring provider:  Latasha INGRAM CNP     Referring providers clinic:  [facility]     Reason for visit/diagnosis  Migraines     RECORDS REQUESTED FROM:       Clinic name Comments Records Status Imaging Status   Internal [facility]-5/3/2022 Epic No Images          [place] Dr. Andrade-5/9/2022 Care [facility] No Images

## 2022-07-27 ENCOUNTER — PATIENT OUTREACH (OUTPATIENT)
Dept: GASTROENTEROLOGY | Facility: CLINIC | Age: 57
End: 2022-07-27

## 2022-07-27 NOTE — TELEPHONE ENCOUNTER
Called patient to discuss in person clinic visit on 8-1 r/t starting Domperidone. Clinic scheduled    ML

## 2022-07-29 ENCOUNTER — HOME INFUSION (PRE-WILLOW HOME INFUSION) (OUTPATIENT)
Dept: PHARMACY | Facility: CLINIC | Age: 57
End: 2022-07-29

## 2022-08-01 ENCOUNTER — OFFICE VISIT (OUTPATIENT)
Dept: GASTROENTEROLOGY | Facility: CLINIC | Age: 57
End: 2022-08-01
Payer: COMMERCIAL

## 2022-08-01 ENCOUNTER — LAB (OUTPATIENT)
Dept: LAB | Facility: CLINIC | Age: 57
End: 2022-08-01
Payer: COMMERCIAL

## 2022-08-01 ENCOUNTER — HOME INFUSION (PRE-WILLOW HOME INFUSION) (OUTPATIENT)
Dept: PHARMACY | Facility: CLINIC | Age: 57
End: 2022-08-01

## 2022-08-01 VITALS
BODY MASS INDEX: 22.52 KG/M2 | OXYGEN SATURATION: 99 % | SYSTOLIC BLOOD PRESSURE: 118 MMHG | DIASTOLIC BLOOD PRESSURE: 78 MMHG | HEIGHT: 67 IN | HEART RATE: 90 BPM | WEIGHT: 143.5 LBS

## 2022-08-01 DIAGNOSIS — K31.84 GASTROPARESIS: Primary | ICD-10-CM

## 2022-08-01 DIAGNOSIS — K31.84 GASTROPARESIS: ICD-10-CM

## 2022-08-01 LAB
ANION GAP SERPL CALCULATED.3IONS-SCNC: 7 MMOL/L (ref 3–14)
BUN SERPL-MCNC: 14 MG/DL (ref 7–30)
CALCIUM SERPL-MCNC: 9.2 MG/DL (ref 8.5–10.1)
CHLORIDE BLD-SCNC: 107 MMOL/L (ref 94–109)
CO2 SERPL-SCNC: 28 MMOL/L (ref 20–32)
CREAT SERPL-MCNC: 0.67 MG/DL (ref 0.52–1.04)
GFR SERPL CREATININE-BSD FRML MDRD: >90 ML/MIN/1.73M2
GLUCOSE BLD-MCNC: 113 MG/DL (ref 70–99)
POTASSIUM BLD-SCNC: 4.1 MMOL/L (ref 3.4–5.3)
SODIUM SERPL-SCNC: 142 MMOL/L (ref 133–144)

## 2022-08-01 PROCEDURE — 99215 OFFICE O/P EST HI 40 MIN: CPT | Mod: GC | Performed by: INTERNAL MEDICINE

## 2022-08-01 PROCEDURE — 80048 BASIC METABOLIC PNL TOTAL CA: CPT | Performed by: PATHOLOGY

## 2022-08-01 PROCEDURE — 36415 COLL VENOUS BLD VENIPUNCTURE: CPT | Performed by: PATHOLOGY

## 2022-08-01 PROCEDURE — 99417 PROLNG OP E/M EACH 15 MIN: CPT | Performed by: INTERNAL MEDICINE

## 2022-08-01 ASSESSMENT — PAIN SCALES - GENERAL: PAINLEVEL: MODERATE PAIN (4)

## 2022-08-01 NOTE — PROGRESS NOTES
GI CLINIC VISIT 5/9/2022    Reason for this visit: follow up     HPI: 57 year old female with a PMHx significant for TPIAT with course complicated by gastroparesis who failed intrapyloric botox injection with ongoing sever exacerbation. She's not a candidate for G-POEM given that would be unlikely benefic ial. S/p venting G-tube. She was last seen by Dr. Park 5/9//22. Today, she presents for initiation of domperidone.     She notes that her IVF hydration continues to be helpful. Still requiring venting of g-tube approximately 4 times/wk. Notes sleep continues to be impaired by nausea- last night was awoken with nausea/emesis. Tapered off amitriptyline (for migraines) 2 months ago and has an appointment to see neurology this month to discuss alternatives. Has had one migraine since discontinuing this medication. Reviewed all of her medications with pharmacist recently to discuss interactions with domperidone- notes that some of her anti-emetics are listed, however, per pharmacy- the risk is very low. She expresses hope that the domperidone will help improve her quality of life. Currently only working 20 hours per week, and even this is difficult. Maintaining her weight, but oral intake remains very difficult. She tolerates smoothies better than more solid alternatives. Hopes one day to be able to eat for pleasure again without significant side effects.     ROS: A complete ROS was performed and is negative except as per HPI.    PERTINENT PAST MEDICAL/SURGICAL HISTORY:  Past Medical History:   Diagnosis Date     Allergic rhinitis, cause unspecified      Allergy to other foods     strawberries, apples, celeries, alice, watermelon     Arthritis     left knee     Choledocholithiasis     long after cholecystectomy     Chronic abdominal pain      Chronic constipation      Chronic nausea      Chronic pancreatitis (H)      Degeneration of lumbar or lumbosacral intervertebral disc      Esophageal reflux     w/ hiatal hernia      Gastroparesis      Hiatal hernia      History of pituitary adenoma     s/p resection     Hypothyroidism      Migraines      Mild hyperlipidemia      On tube feeding diet     presence of GJ tube     Pancreatic disease     PD stricture, 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 endometriosis growth     APPENDECTOMY        SECTION       COLONOSCOPY       ENDOSCOPIC INSERTION TUBE GASTROSTOMY N/A 2021    Procedure: Gastrojejonostomy placement with jejunopexy, PEG tube exchange;  Surgeon: Zackery Montoya MD;  [facility]     ENDOSCOPIC RETROGRADE CHOLANGIOPANCREATOGRAM N/A 2015    Procedure: ENDOSCOPIC RETROGRADE CHOLANGIOPANCREATOGRAM;  Surgeon: Mandeep Park MD;  [facility]     ENDOSCOPIC RETROGRADE CHOLANGIOPANCREATOGRAM N/A 2016    Procedure: COMBINED ENDOSCOPIC RETROGRADE CHOLANGIOPANCREATOGRAPHY, PLACE TUBE/STENT;  Surgeon: Mandeep Park MD;  [facility]     ENDOSCOPIC RETROGRADE CHOLANGIOPANCREATOGRAM N/A 3/17/2016    Procedure: COMBINED ENDOSCOPIC RETROGRADE CHOLANGIOPANCREATOGRAPHY, REMOVE FOREIGN BODY OR STENT/TUBE;  Surgeon: Mandeep Park MD;  [facility]     ENDOSCOPIC RETROGRADE CHOLANGIOPANCREATOGRAM N/A 2016    Procedure: COMBINED ENDOSCOPIC RETROGRADE CHOLANGIOPANCREATOGRAPHY, PLACE TUBE/STENT;  Surgeon: Mandeep Park MD;  [facility]     ENDOSCOPIC RETROGRADE CHOLANGIOPANCREATOGRAM N/A 2016    Procedure: COMBINED ENDOSCOPIC RETROGRADE CHOLANGIOPANCREATOGRAPHY, REMOVE FOREIGN BODY OR STENT/TUBE;  Surgeon: Mandeep Park MD;  [facility]     ENDOSCOPIC ULTRASOUND UPPER GASTROINTESTINAL TRACT (GI) N/A 10/3/2016    Procedure: ENDOSCOPIC ULTRASOUND,  ESOPHAGOSCOPY / UPPER GASTROINTESTINAL TRACT (GI);  Surgeon: Guru Jose Rodas MD;  [facility]     ENTEROSCOPY SMALL BOWEL N/A 2/3/2022    Procedure: ENTEROSCOPY with possible fistula closure;  Surgeon: Francisco Dodson MD;  Location: [facility]     ESOPHAGOSCOPY, GASTROSCOPY, DUODENOSCOPY (EGD), COMBINED N/A 6/24/2015    Procedure: COMBINED ESOPHAGOSCOPY, GASTROSCOPY, DUODENOSCOPY (EGD), REMOVE FOREIGN BODY;  Surgeon: Mandeep Park MD;  [facility]     ESOPHAGOSCOPY, GASTROSCOPY, DUODENOSCOPY (EGD), COMBINED N/A 10/25/2015    Procedure: COMBINED ESOPHAGOSCOPY, GASTROSCOPY, DUODENOSCOPY (EGD);  Surgeon: Sammy Amaro MD;  [facility]     ESOPHAGOSCOPY, GASTROSCOPY, DUODENOSCOPY (EGD), COMBINED N/A 10/25/2015    Procedure: COMBINED ESOPHAGOSCOPY, GASTROSCOPY, DUODENOSCOPY (EGD), BIOPSY SINGLE OR MULTIPLE;  Surgeon: Sammy Amaro MD;  [facility]     ESOPHAGOSCOPY, GASTROSCOPY, DUODENOSCOPY (EGD), DILATATION, COMBINED       EXCISE LESION TRUNK N/A 4/17/2017    Procedure: EXCISE LESION TRUNK;  Removal of Abdominal Foreign Body;  Surgeon: Nestor Phoenix MD;  [facility]     HC ESOPH/GAS REFLUX TEST W NASAL IMPED >1 HR N/A 11/19/2015    Procedure: ESOPHAGEAL IMPEDENCE FUNCTION TEST WITH 24 HOUR PH GREATER THAN 1 HOUR;  Surgeon: Thiago Apple MD;  Location: [facility]     HC UGI ENDOSCOPY DIAG W BIOPSY  9/17/08     HC UGI ENDOSCOPY DIAG W BIOPSY  9/27/12     HC UGI ENDOSCOPY W ESOPHAGEAL DILATION BALLOON <30MM  9/17/08     HC UGI ENDOSCOPY W EUS N/A 5/5/2015    Procedure: COMBINED ENDOSCOPIC ULTRASOUND, ESOPHAGOSCOPY, GASTROSCOPY, DUODENOSCOPY (EGD);  Surgeon: mW Dueñas MD;  [facility]     HC WRIST ARTHROSCOP,RELEASE XVERS LIG Bilateral 12/17/08     INJECT TRANSVERSUS ABDOMINIS PLANE (TAP) BLOCK BILATERAL Left 9/22/2016    Procedure: INJECT TRANSVERSUS ABDOMINIS PLANE (TAP) BLOCK BILATERAL;  Surgeon: Dickson Corrigan MD;  [facility]     IR  CHEST PORT PLACEMENT < 5 YRS OF AGE  6/10/2022     laparoscopic pineda       LAPAROSCOPIC HERNIORRHAPHY INCISIONAL N/A 2018    Procedure: LAPAROSCOPIC HERNIORRHAPHY INCISIONAL;  Laparoscopic Incisional Hernia Repair with Symbotex Mesh Implant;  Surgeon: Nestor Phoenix MD;  [facility]     LAPAROSCOPIC PANCREATECTOMY, TRANSPLANT AUTO ISLET CELL N/A 2016    Procedure: LAPAROSCOPIC PANCREATECTOMY, TRANSPLANT AUTO ISLET CELL;  Surgeon: Nestor Phoenix MD;  [facility]     REMOVE GASTROSTOMY TUBE ADULT N/A 2022    Procedure: REMOVAL, GASTROSTOMY TUBE, ADULT;  Surgeon: Mandeep Park MD;  [facility]     REPLACE GASTROJEJUNOSTOMY TUBE, PERCUTANEOUS N/A 2021    Procedure: GASTROJEJUNOSTOMY TUBE PLACEMENT, PERCUTANEOUS, WITH GASTROPEXY;  Surgeon: Mandeep Park MD;  [facility]     REPLACE GASTROJEJUNOSTOMY TUBE, PERCUTANEOUS N/A 2021    Procedure: REPLACEMENT, GASTROJEJUNOSTOMY TUBE, PERCUTANEOUS;  Surgeon: Zackery Montoya MD;  [facility]     REPLACE JEJUNOSTOMY TUBE, PERCUTANEOUS N/A 9/10/2021    Procedure: UPPER ENDOSCOPY, REPLACEMENT OF PERCUTANEOUS GASTROJEJUNOSTOMY TUBE, T-TAG GASTROPEXY;  Surgeon: Zackery Montoya MD;  [facility]     REPLACE JEJUNOSTOMY TUBE, PERCUTANEOUS N/A 2021    Procedure: REPLACEMENT, JEJUNOSTOMY TUBE, PERCUTANEOUS;  Surgeon: Mandeep Park MD;  [facility]     transphenoidal pituitary resection       [facility]  DELIVERY ONLY       Z  DELIVERY ONLY      repeat c section with incidental cystotomy with repair     ZZC EXCIS PITUITARY,TRANSNASAL/SEPTAL      pituitary tumor removed for diabetes insipidus     Z TOTAL ABDOM HYSTERECTOMY      w/ bilateral salpingoophorectomy      As noted above.    PERTINENT MEDICATIONS:  Current Outpatient Medications   Medication     acarbose (PRECOSE) 50 MG tablet     amylase-lipase-protease (CREON 24) 65366-15001 units CPEP per EC  "capsule     amylase-lipase-protease (CREON) 37894-39809 units CPEP per EC capsule     azelastine (ASTELIN) 0.1 % nasal spray     blood glucose (PAT CONTOUR NEXT) test strip     blood glucose monitoring (PAT MICROLET) lancets     cetirizine (ZYRTEC) 10 MG tablet     Continuous Blood Gluc Sensor (FREESTYLE LISA 2 SENSOR) MISC     Continuous Blood Gluc Sensor (FREESTYLE LISA 2 SENSOR) MISC     cyclobenzaprine (FLEXERIL) 10 MG tablet     diphenhydrAMINE (BENADRYL ALLERGY) 25 MG capsule     estradiol (VAGIFEM) 10 MCG TABS vaginal tablet     famotidine (PEPCID) 20 MG tablet     Glucagon (GVOKE HYPOPEN 1-PACK) 1 MG/0.2ML SOAJ     glucagon 1 MG kit     glucose 40 % GEL gel     Hydroactive Dressings (TEGADERM HYDROCOLLOID THIN) MISC     HYDROmorphone, STANDARD CONC, (DILAUDID) 1 MG/ML oral solution     ibuprofen (ADVIL/MOTRIN) 400 MG tablet     insulin syringe-needle U-100 (30G X 1/2\" 0.3 ML) 30G X 1/2\" 0.3 ML miscellaneous     levothyroxine (SYNTHROID/LEVOTHROID) 137 MCG tablet     Lidocaine (LIDOCARE) 4 % Patch     methocarbamol (ROBAXIN) 750 MG tablet     montelukast (SINGULAIR) 10 MG tablet     Multiple Vitamin (MULTIVITAMIN ADULT PO)     Nutritional Supplements (BOOST HIGH PROTEIN) LIQD     omeprazole (PRILOSEC) 40 MG DR capsule     order for DME     polyethylene glycol (MIRALAX) 17 GM/Dose powder     prochlorperazine (COMPAZINE) 10 MG tablet     promethazine (PHENERGAN) 25 MG tablet     Prucalopride Succinate 2 MG TABS     scopolamine (TRANSDERM) 1 MG/3DAYS 72 hr patch     senna-docusate (SENOKOT-S;PERICOLACE) 8.6-50 MG per tablet     Sharps Container (BD SHARPS ) MISC     ZOLMitriptan (ZOMIG) 5 MG tablet     No current facility-administered medications for this visit.     Medications reviewed with patient today, see Medication List/Assessment for details.    PHYSICAL EXAMINATION:  Vitals reviewed in epic    General: Alert, appears comfortable, engages in conversation.  HEENT: Pupils equal and round, no " scleral icterus noted. Pupils equal and round.  CV: RRR, normal S1/S2, no m/r/g  Pulm: BLCTA, no noted cough, nonlabored breathing.  Abd: Soft, tender to palpation in epigastric, RUQ/LUQ area- no noted rebound tenderness, guarding, rigidity. BS+  Ext: Warm, no noted BL LE edema. Distal pulses palpable.   Neuro: alert and oriented. Stable gait  Psych: Pleasant, cooperative     PERTINENT STUDIES:  Small bowel enteroscopy 2/3/22  Impression:       - Normal esophagus.                             - Intact gastrostomy with a patent G-tube present                             characterized by healthy appearing mucosa.                             - Normal pylorus. Injected with 200 units of                             botulinum toxin.                             - Widely patent duodenoenterostomy, characterized                             by healthy appearing mucosa was found.                             - Widely patent enteroenterostomy, characterized by                             healthy appearing mucosa was found in the jejunum.                             - Probably prior PEJ site visualized                             endoscopically. No persistent fistula seen.                             Negative leak test. Consitent with clinical                             resolution of leakage.                             - No specimens collected.     ASSESSMENT/PLAN:    #Gastroparesis, failed intrapyloric botox injection  #S/p TPIAT   #Nausea/Emesis  Pt has had a prolonged, complicated course, but overall remains stable. Still having severe limitations with oral intake, along with episodic nausea/vomiting, constipation, and inability to tolerate oral intake. She is cautiously optimistic regarding the initiation of domperidone in improving her motility symptoms. Had BMP today, which was reviewed and is WNL. EKG also obtained today per protocol, which is pending upload into EMR. Prior EKGs reviewed and Qtc<470 per protocol.  Domperidone protocol and consent form reviewed at length with patient, who has also independently reviewed the entirety of the protocol and also consent form at home. All questions answered to the best of Dr. Park's ability and pt had no further questions at this time.   -Plan for initiation of domperidone at 10mg QID   -Will uptitrate depending on symptoms per protocol  -EKG pending review   -Pt counseled to send message to GI team if initiating any new medications given risk of medication interactions    Above patient was seen with Dr. Park, attending physician, who agrees with the above assessment and plan.     Tania Chi MD  PGY-2   Internal Medicine    Seen and examined with GI PGY2, agree with findings and recommendations.  The patient and I went through the entire protocol and consent for domperidone IND, reviewing risks, protocol, all of which she had reviewed in advance.  Plan to start on 10mg qid of domperidone, monitoring closely for any side effects or EKG changes. Pt has stopped amitriptylline in anticipation of any interaction, and is seeing neurology next week to explore alternative options for migraines.   Note: all electrolytes normal, EKG completed but not read - will pend Rx until QT interval reviewed. Was well wnl two months ago at last EKG.   90 minutes total in person reviewing all protocols, obtaining consent, reviewing results.  Plan  Review EKG once read   Send RX w IND # to pharmacy in [place]  Follow-up  for scheduled EKG   Mandeep Park MD GI Staff

## 2022-08-01 NOTE — NURSING NOTE
"Chief Complaint   Patient presents with     Follow Up       Vitals:    08/01/22 1355   BP: 118/78   Pulse: 90   SpO2: 99%   Weight: 65.1 kg (143 lb 8 oz)   Height: 1.702 m (5' 7\")       Body mass index is 22.48 kg/m .    Rebecca Maier MA    "

## 2022-08-01 NOTE — PROGRESS NOTES
This is a recent snapshot of the patient's [facility] medical record.  For current drug dose and complete information and questions, call [phone] or In Basket pool, [facility] ([id number])  CSN Number:  [id number]

## 2022-08-01 NOTE — LETTER
8/1/2022         RE: Dinora Mcghee  [address] [facility] [address]        Dear Colleague,    Thank you for referring your patient, Dinora Mcghee, to the [facility] [place]. Please see a copy of my visit note below.    GI CLINIC VISIT 5/9/2022    Reason for this visit: follow up     HPI: 57 year old female with a PMHx significant for TPIAT with course complicated by gastroparesis who failed intrapyloric botox injection with ongoing sever exacerbation. She's not a candidate for G-POEM given that would be unlikely benefic ial. S/p venting G-tube. She was last seen by Dr. Park 5/9//22. Today, she presents for initiation of domperidone.     She notes that her IVF hydration continues to be helpful. Still requiring venting of g-tube approximately 4 times/wk. Notes sleep continues to be impaired by nausea- last night was awoken with nausea/emesis. Tapered off amitriptyline (for migraines) 2 months ago and has an appointment to see neurology this month to discuss alternatives. Has had one migraine since discontinuing this medication. Reviewed all of her medications with pharmacist recently to discuss interactions with domperidone- notes that some of her anti-emetics are listed, however, per pharmacy- the risk is very low. She expresses hope that the domperidone will help improve her quality of life. Currently only working 20 hours per week, and even this is difficult. Maintaining her weight, but oral intake remains very difficult. She tolerates smoothies better than more solid alternatives. Hopes one day to be able to eat for pleasure again without significant side effects.     ROS: A complete ROS was performed and is negative except as per HPI.    PERTINENT PAST MEDICAL/SURGICAL HISTORY:  Past Medical History:   Diagnosis Date     Allergic rhinitis, cause unspecified      Allergy to other foods     strawberries, apples, celeries, alice, watermelon     Arthritis      left knee     Choledocholithiasis     long after cholecystectomy     Chronic abdominal pain      Chronic constipation      Chronic nausea      Chronic pancreatitis (H)      Degeneration of lumbar or lumbosacral intervertebral disc      Esophageal reflux     w/ hiatal hernia     Gastroparesis      Hiatal hernia      History of pituitary adenoma     s/p resection     Hypothyroidism      Migraines      Mild hyperlipidemia      On tube feeding diet     presence of GJ tube     Pancreatic disease     PD stricture, 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 endometriosis growth     APPENDECTOMY        SECTION       COLONOSCOPY       ENDOSCOPIC INSERTION TUBE GASTROSTOMY N/A 2021    Procedure: Gastrojejonostomy placement with jejunopexy, PEG tube exchange;  Surgeon: Zackery Montoya MD;  [facility]     ENDOSCOPIC RETROGRADE CHOLANGIOPANCREATOGRAM N/A 2015    Procedure: ENDOSCOPIC RETROGRADE CHOLANGIOPANCREATOGRAM;  Surgeon: Mandeep Park MD;  [facility]     ENDOSCOPIC RETROGRADE CHOLANGIOPANCREATOGRAM N/A 2016    Procedure: COMBINED ENDOSCOPIC RETROGRADE CHOLANGIOPANCREATOGRAPHY, PLACE TUBE/STENT;  Surgeon: Mandeep Park MD;  [facility]     ENDOSCOPIC RETROGRADE CHOLANGIOPANCREATOGRAM N/A 3/17/2016    Procedure: COMBINED ENDOSCOPIC RETROGRADE CHOLANGIOPANCREATOGRAPHY, REMOVE FOREIGN BODY OR STENT/TUBE;  Surgeon: Mandeep Park MD;  [facility]     ENDOSCOPIC RETROGRADE CHOLANGIOPANCREATOGRAM N/A 2016    Procedure: COMBINED ENDOSCOPIC RETROGRADE CHOLANGIOPANCREATOGRAPHY, PLACE TUBE/STENT;  Surgeon: Mandeep Park MD;  [facility]     ENDOSCOPIC RETROGRADE CHOLANGIOPANCREATOGRAM N/A  8/26/2016    Procedure: COMBINED ENDOSCOPIC RETROGRADE CHOLANGIOPANCREATOGRAPHY, REMOVE FOREIGN BODY OR STENT/TUBE;  Surgeon: Mandeep Park MD;  [facility]     ENDOSCOPIC ULTRASOUND UPPER GASTROINTESTINAL TRACT (GI) N/A 10/3/2016    Procedure: ENDOSCOPIC ULTRASOUND, ESOPHAGOSCOPY / UPPER GASTROINTESTINAL TRACT (GI);  Surgeon: Guru Jose Rodas MD;  [facility]     ENTEROSCOPY SMALL BOWEL N/A 2/3/2022    Procedure: ENTEROSCOPY with possible fistula closure;  Surgeon: Francisco Dodson MD;  Location:  GI     ESOPHAGOSCOPY, GASTROSCOPY, DUODENOSCOPY (EGD), COMBINED N/A 6/24/2015    Procedure: COMBINED ESOPHAGOSCOPY, GASTROSCOPY, DUODENOSCOPY (EGD), REMOVE FOREIGN BODY;  Surgeon: Mandeep Park MD;  Location:  GI     ESOPHAGOSCOPY, GASTROSCOPY, DUODENOSCOPY (EGD), COMBINED N/A 10/25/2015    Procedure: COMBINED ESOPHAGOSCOPY, GASTROSCOPY, DUODENOSCOPY (EGD);  Surgeon: Sammy Amaro MD;  Location:  GI     ESOPHAGOSCOPY, GASTROSCOPY, DUODENOSCOPY (EGD), COMBINED N/A 10/25/2015    Procedure: COMBINED ESOPHAGOSCOPY, GASTROSCOPY, DUODENOSCOPY (EGD), BIOPSY SINGLE OR MULTIPLE;  Surgeon: Sammy Amaro MD;  Location:  GI     ESOPHAGOSCOPY, GASTROSCOPY, DUODENOSCOPY (EGD), DILATATION, COMBINED       EXCISE LESION TRUNK N/A 4/17/2017    Procedure: EXCISE LESION TRUNK;  Removal of Abdominal Foreign Body;  Surgeon: Nestor Phoenix MD;  [facility]     HC ESOPH/GAS REFLUX TEST W NASAL IMPED >1 HR N/A 11/19/2015    Procedure: ESOPHAGEAL IMPEDENCE FUNCTION TEST WITH 24 HOUR PH GREATER THAN 1 HOUR;  Surgeon: Thiago Apple MD;  [facility] GI     HC UGI ENDOSCOPY DIAG W BIOPSY  9/17/08     HC UGI ENDOSCOPY DIAG W BIOPSY  9/27/12     HC UGI ENDOSCOPY W ESOPHAGEAL DILATION BALLOON <30MM  9/17/08     HC UGI ENDOSCOPY W EUS N/A 5/5/2015    Procedure: COMBINED ENDOSCOPIC ULTRASOUND, ESOPHAGOSCOPY, GASTROSCOPY, DUODENOSCOPY (EGD);  Surgeon: Wm Dueñas MD;   [facility] GI     HC WRIST ARTHROSCOP,RELEASE XVERS LIG Bilateral 08     INJECT TRANSVERSUS ABDOMINIS PLANE (TAP) BLOCK BILATERAL Left 2016    Procedure: INJECT TRANSVERSUS ABDOMINIS PLANE (TAP) BLOCK BILATERAL;  Surgeon: Dickson Corrigan MD;  [facility]     IR CHEST PORT PLACEMENT < 5 YRS OF AGE  6/10/2022     laparoscopic pineda       LAPAROSCOPIC HERNIORRHAPHY INCISIONAL N/A 2018    Procedure: LAPAROSCOPIC HERNIORRHAPHY INCISIONAL;  Laparoscopic Incisional Hernia Repair with Symbotex Mesh Implant;  Surgeon: Nestor Phoenix MD;  [facility]     LAPAROSCOPIC PANCREATECTOMY, TRANSPLANT AUTO ISLET CELL N/A 2016    Procedure: LAPAROSCOPIC PANCREATECTOMY, TRANSPLANT AUTO ISLET CELL;  Surgeon: Nestor Phoenix MD;  [facility]     REMOVE GASTROSTOMY TUBE ADULT N/A 2022    Procedure: REMOVAL, GASTROSTOMY TUBE, ADULT;  Surgeon: Mandeep Park MD;  [facility]     REPLACE GASTROJEJUNOSTOMY TUBE, PERCUTANEOUS N/A 2021    Procedure: GASTROJEJUNOSTOMY TUBE PLACEMENT, PERCUTANEOUS, WITH GASTROPEXY;  Surgeon: Mandeep Park MD;  [facility]     REPLACE GASTROJEJUNOSTOMY TUBE, PERCUTANEOUS N/A 2021    Procedure: REPLACEMENT, GASTROJEJUNOSTOMY TUBE, PERCUTANEOUS;  Surgeon: Zackery Montoya MD;  [facility]     REPLACE JEJUNOSTOMY TUBE, PERCUTANEOUS N/A 9/10/2021    Procedure: UPPER ENDOSCOPY, REPLACEMENT OF PERCUTANEOUS GASTROJEJUNOSTOMY TUBE, T-TAG GASTROPEXY;  Surgeon: Zackery Montoya MD;  [facility]     REPLACE JEJUNOSTOMY TUBE, PERCUTANEOUS N/A 2021    Procedure: REPLACEMENT, JEJUNOSTOMY TUBE, PERCUTANEOUS;  Surgeon: Mandeep Park MD;  [facility]     transphenoidal pituitary resection       ZZC  DELIVERY ONLY       ZZC  DELIVERY ONLY      repeat c section with incidental cystotomy with repair     ZZC EXCIS PITUITARY,TRANSNASAL/SEPTAL      pituitary tumor removed for diabetes  "insipidus     Z TOTAL ABDOM HYSTERECTOMY  1999    w/ bilateral salpingoophorectomy      As noted above.    PERTINENT MEDICATIONS:  Current Outpatient Medications   Medication     acarbose (PRECOSE) 50 MG tablet     amylase-lipase-protease (CREON 24) 91301-35984 units CPEP per EC capsule     amylase-lipase-protease (CREON) 49072-29424 units CPEP per EC capsule     azelastine (ASTELIN) 0.1 % nasal spray     blood glucose (PAT CONTOUR NEXT) test strip     blood glucose monitoring (PAT MICROLET) lancets     cetirizine (ZYRTEC) 10 MG tablet     Continuous Blood Gluc Sensor (FREESTYLE LISA 2 SENSOR) MISC     Continuous Blood Gluc Sensor (FREESTYLE LISA 2 SENSOR) MISC     cyclobenzaprine (FLEXERIL) 10 MG tablet     diphenhydrAMINE (BENADRYL ALLERGY) 25 MG capsule     estradiol (VAGIFEM) 10 MCG TABS vaginal tablet     famotidine (PEPCID) 20 MG tablet     Glucagon (GVOKE HYPOPEN 1-PACK) 1 MG/0.2ML SOAJ     glucagon 1 MG kit     glucose 40 % GEL gel     Hydroactive Dressings (TEGADERM HYDROCOLLOID THIN) MISC     HYDROmorphone, STANDARD CONC, (DILAUDID) 1 MG/ML oral solution     ibuprofen (ADVIL/MOTRIN) 400 MG tablet     insulin syringe-needle U-100 (30G X 1/2\" 0.3 ML) 30G X 1/2\" 0.3 ML miscellaneous     levothyroxine (SYNTHROID/LEVOTHROID) 137 MCG tablet     Lidocaine (LIDOCARE) 4 % Patch     methocarbamol (ROBAXIN) 750 MG tablet     montelukast (SINGULAIR) 10 MG tablet     Multiple Vitamin (MULTIVITAMIN ADULT PO)     Nutritional Supplements (BOOST HIGH PROTEIN) LIQD     omeprazole (PRILOSEC) 40 MG DR capsule     order for DME     polyethylene glycol (MIRALAX) 17 GM/Dose powder     prochlorperazine (COMPAZINE) 10 MG tablet     promethazine (PHENERGAN) 25 MG tablet     Prucalopride Succinate 2 MG TABS     scopolamine (TRANSDERM) 1 MG/3DAYS 72 hr patch     senna-docusate (SENOKOT-S;PERICOLACE) 8.6-50 MG per tablet     Sharps Container (BD SHARPS ) MISC     ZOLMitriptan (ZOMIG) 5 MG tablet     No current " facility-administered medications for this visit.     Medications reviewed with patient today, see Medication List/Assessment for details.    PHYSICAL EXAMINATION:  Vitals reviewed in epic    General: Alert, appears comfortable, engages in conversation.  HEENT: Pupils equal and round, no scleral icterus noted. Pupils equal and round.  CV: RRR, normal S1/S2, no m/r/g  Pulm: BLCTA, no noted cough, nonlabored breathing.  Abd: Soft, tender to palpation in epigastric, RUQ/LUQ area- no noted rebound tenderness, guarding, rigidity. BS+  Ext: Warm, no noted BL LE edema. Distal pulses palpable.   Neuro: alert and oriented. Stable gait  Psych: Pleasant, cooperative     PERTINENT STUDIES:  Small bowel enteroscopy 2/3/22  Impression:       - Normal esophagus.                             - Intact gastrostomy with a patent G-tube present                             characterized by healthy appearing mucosa.                             - Normal pylorus. Injected with 200 units of                             botulinum toxin.                             - Widely patent duodenoenterostomy, characterized                             by healthy appearing mucosa was found.                             - Widely patent enteroenterostomy, characterized by                             healthy appearing mucosa was found in the jejunum.                             - Probably prior PEJ site visualized                             endoscopically. No persistent fistula seen.                             Negative leak test. Consitent with clinical                             resolution of leakage.                             - No specimens collected.     ASSESSMENT/PLAN:    #Gastroparesis, failed intrapyloric botox injection  #S/p TPIAT   #Nausea/Emesis  Pt has had a prolonged, complicated course, but overall remains stable. Still having severe limitations with oral intake, along with episodic nausea/vomiting, constipation, and inability to tolerate  oral intake. She is cautiously optimistic regarding the initiation of domperidone in improving her motility symptoms. Had BMP today, which was reviewed and is WNL. EKG also obtained today per protocol, which is pending upload into EMR. Prior EKGs reviewed and Qtc<470 per protocol. Domperidone protocol and consent form reviewed at length with patient, who has also independently reviewed the entirety of the protocol and also consent form at home. All questions answered to the best of Dr. Park's ability and pt had no further questions at this time.   -Plan for initiation of domperidone at 10mg QID   -Will uptitrate depending on symptoms per protocol  -EKG pending review   -Pt counseled to send message to GI team if initiating any new medications given risk of medication interactions    Above patient was seen with Dr. Park, attending physician, who agrees with the above assessment and plan.     Tania Chi MD  PGY-2   Internal Medicine    Seen and examined with GI PGY2, agree with findings and recommendations.  The patient and I went through the entire protocol and consent for domperidone IND, reviewing risks, protocol, all of which she had reviewed in advance.  Plan to start on 10mg qid of domperidone, monitoring closely for any side effects or EKG changes. Pt has stopped amitriptylline in anticipation of any interaction, and is seeing neurology next week to explore alternative options for migraines.   Note: all electrolytes normal, EKG completed but not read - will pend Rx until QT interval reviewed. Was well wnl two months ago at last EKG.   90 minutes total in person reviewing all protocols, obtaining consent, reviewing results.  Plan  Review EKG once read   Send RX w IND # to pharmacy in [place]  Follow-up  for scheduled EKG   Mandeep Park MD GI Staff         Sincerely,    Mandeep Park MD

## 2022-08-02 NOTE — PROGRESS NOTES
This is a recent snapshot of the patient's [facility] medical record.  For current drug dose and complete information and questions, call [phone] or In Basket pool, [facility] home infusion ([id number])  CSN Number:  [id number]

## 2022-08-03 ENCOUNTER — MYC MEDICAL ADVICE (OUTPATIENT)
Dept: GASTROENTEROLOGY | Facility: CLINIC | Age: 57
End: 2022-08-03

## 2022-08-03 ENCOUNTER — VIRTUAL VISIT (OUTPATIENT)
Dept: NEUROLOGY | Facility: CLINIC | Age: 57
End: 2022-08-03
Attending: NURSE PRACTITIONER
Payer: COMMERCIAL

## 2022-08-03 ENCOUNTER — PRE VISIT (OUTPATIENT)
Dept: NEUROLOGY | Facility: CLINIC | Age: 57
End: 2022-08-03

## 2022-08-03 DIAGNOSIS — Z90.410 POST-PANCREATECTOMY DIABETES (H): ICD-10-CM

## 2022-08-03 DIAGNOSIS — E89.1 POST-PANCREATECTOMY DIABETES (H): ICD-10-CM

## 2022-08-03 DIAGNOSIS — G43.109 MIGRAINE WITH AURA AND WITHOUT STATUS MIGRAINOSUS, NOT INTRACTABLE: ICD-10-CM

## 2022-08-03 DIAGNOSIS — Z94.83 S/P PANCREATIC ISLET CELL TRANSPLANTATION (H): ICD-10-CM

## 2022-08-03 DIAGNOSIS — E13.9 POST-PANCREATECTOMY DIABETES (H): ICD-10-CM

## 2022-08-03 DIAGNOSIS — Z98.890 HISTORY OF PITUITARY SURGERY: ICD-10-CM

## 2022-08-03 DIAGNOSIS — R51.0 POSITIONAL HEADACHE: Primary | ICD-10-CM

## 2022-08-03 PROCEDURE — 99215 OFFICE O/P EST HI 40 MIN: CPT | Mod: 95 | Performed by: NURSE PRACTITIONER

## 2022-08-03 RX ORDER — ZOLMITRIPTAN 5 MG/1
5 TABLET, ORALLY DISINTEGRATING ORAL
Qty: 18 TABLET | Refills: 6 | Status: SHIPPED | OUTPATIENT
Start: 2022-08-03 | End: 2022-09-13

## 2022-08-03 ASSESSMENT — MIGRAINE DISABILITY ASSESSMENT (MIDAS)
HOW MANY DAYS WAS YOUR PRODUCTIVITY CUT IN HALF BECAUSE OF HEADACHES: 15
HOW MANY DAYS IN THE PAST 3 MONTHS HAVE YOU HAD A HEADACHE: 46
HOW MANY DAYS WAS HOUSEWORK PRODUCTIVITY CUT IN HALF DUE TO HEADACHES: 15
HOW MANY DAYS DID YOU NOT DO HOUSEWORK BECAUSE OF HEADACHES: 10
TOTAL SCORE: 46
HOW MANY DAYS DID YOU MISS WORK OR SCHOOL BECAUSE OF HEADACHES: 3
HOW OFTEN WERE SOCIAL ACTIVITIES MISSED DUE TO HEADACHES: 3
ON A SCALE FROM 0-10 ON AVERAGE HOW PAINFUL WERE HEADACHES: 5

## 2022-08-03 ASSESSMENT — HEADACHE IMPACT TEST (HIT 6)
HOW OFTEN HAVE YOU FELT FED UP OR IRRITATED BECAUSE OF YOUR HEADACHES: SOMETIMES
HOW OFTEN DO HEADACHES LIMIT YOUR DAILY ACTIVITIES: SOMETIMES
WHEN YOU HAVE HEADACHES HOW OFTEN IS THE PAIN SEVERE: VERY OFTEN
HOW OFTEN DID HEADACHS LIMIT CONCENTRATION ON WORK OR DAILY ACTIVITY: VERY OFTEN
HIT6 TOTAL SCORE: 65
HOW OFTEN HAVE YOU FELT TOO TIRED TO WORK BECAUSE OF YOUR HEADACHES: SOMETIMES
WHEN YOU HAVE A HEADACHE HOW OFTEN DO YOU WISH YOU COULD LIE DOWN: ALWAYS

## 2022-08-03 NOTE — NURSING NOTE
Domperidone 10mcg 4x daily-   part of study     Pt states that all meds last reviewed by staff 2 days ago, are current. Also verified through e-checkin.

## 2022-08-03 NOTE — TELEPHONE ENCOUNTER
Pt requsting verbal orders to davion NOVAK-KEY with home care.     [facility]  [phone]    Fax [phone] to fax orders.     Orders faxed.    ML

## 2022-08-03 NOTE — PROGRESS NOTES
"Dinora is a 57 year old who is being evaluated via a billable video visit.      How would you like to obtain your AVS? MyChart  If the video visit is dropped, the invitation should be resent by: Text to cell phone: [phone]   Will anyone else be joining your video visit? No        Video-Visit Details    Video Start Time: 1:36 PM    Type of service:  Video Visit    Video End Time:2:37 PM    Originating Location (pt. Location): Home    Distant Location (provider location):  [facility] NEUROLOGY [facility]     Platform used for Video Visit: [employer]att[employer]     ASSESSMENT AND PLAN:  Headache. New symptoms within the last year -headaches triggered with laying flat and low grade headaches. History of pituitary adenoma and s/p resection. No brain MRI for many years at least   Brain MRI for secondary causes -  Optimizing chronic migraine headache prevention -try to avoid any oral medications due to poor absorption.   CGRP-Emgality, Ajovy or Botox -side effects and injections reviewed. Patient would like to do research on above options discussed.   Rescue treatment-limit use of zomig to no more than 9 days per month. May try nasal spray or dissolvable pill  Follow up -will get message about treatment decided       Subjective   Dinora is a 57 year old presenting for the following health issues:  Headache  Hx s/p post pancreas Tx TPIAT in 2016, gastroparesis, G and J tubes   S/p pituitary adenoma and transnasal resection at the age of 26    HPI   Headaches for years since pregnant with oldest child 29 years ago   Will soon start Tx with a new med not approved to use in the [place].     3-4/week and typically sees \"spots and lights\" and a predictor that in a few hours will get a headache  and consistently starts in the morning and treats with acetaminophen occasionally works and typically uses zomig a couple times week -2-3/week. Duration -up to 48 hours  Low grade headache for years   Associated with Light sensitivity, " "nausea and vomiting   Headaches worsen with laying flat and wakes 2-3 am to sit up because headache gets worse for a year. Got a new bed but it did not help and sleeps in the chair/recliner sometimes  Noticed vision blurry and lightheadiness, gets irritated.   Pain feels sharp and temples -feels like \"pulsating\" and temples enlarged. Sometimes in the back of the head and skull area. Ice pack on the forehead   Triggers strobe lights, fire engines,  extreme heat.     FH -mother, father, sister, nephews, uncles.     Amitriptyline for 15 years and stopped   Topiramate  Sumatriptan   lyrica-\"brain fog\"  depakote -allergies  Compazine +benedryl  Promethazine IV three times per week for   scopalamine patch      Objective    Vitals - Patient Reported  Weight (Patient Reported): 64.9 kg (143 lb)  Pain Score: Moderate Pain (4)  Pain Loc: Abdomen      Physical Exam   GENERAL: Healthy, alert and no distress  EYES: Eyes grossly normal to inspection.RESP: No audible wheeze, cough, or visible cyanosis.  No visible retractions or increased work of breathing.    SKIN: Visible skin clear.   NEURO: Face is symmetrical and symmetrical facial expressions, no apperent weakness Mentation and speech appropriate for age.  PSYCH: Mentation appears normal, affect normal, judgement and insight intact, normal speech and appearance well-groomed.    I discussed all my recommendations with Dinora Mcghee who verbalizes understanding and comfortable with the plan.  All of patient's questions were answered from the best of my knowledge.  Patient is in agreement with the plan.     62 minutes spent on the date of the encounter doing video access, chart  review, results review,  meds review, treatment plan, documentation and further activities as noted above    NATALY Andrew, CNP [facility]  Headache certified  [facility]        "

## 2022-08-03 NOTE — PATIENT INSTRUCTIONS
Brain MRI for secondary causes -  Optimizing chronic migraine headache prevention -try to avoid any oral medications due to poor absorption.   CGRP-Emgality, Ajovy or Botox -side effects and injections reviewed. Patient would like to do research on above options discussed.   Rescue treatment-limit use of zomig to no more than 9 days per month. May try nasal spray or dissolvable pill  Follow up -will get message about treatment decided

## 2022-08-03 NOTE — LETTER
"8/3/2022       RE: Dinora Mcghee  [address] [facility] [address]     Dear Colleague,    Thank you for referring your patient, Dinora Mcghee, to the [facility]UROLOG[facility] CLINIC [place] [facility]. Please see a copy of my visit note below.    ASSESSMENT AND PLAN:  Headache. New symptoms within the last year -headaches triggered with laying flat and low grade headaches. History of pituitary adenoma and s/p resection. No brain MRI for many years at least   Brain MRI for secondary causes -  Optimizing chronic migraine headache prevention -try to avoid any oral medications due to poor absorption.   CGRP-Emgality, Ajovy or Botox -side effects and injections reviewed. Patient would like to do research on above options discussed.   Rescue treatment-limit use of zomig to no more than 9 days per month. May try nasal spray or dissolvable pill  Follow up -will get message about treatment decided       Subjective   Dinora is a 57 year old presenting for the following health issues:  Headache  Hx s/p post pancreas Tx TPIAT in 2016, gastroparesis, G and J tubes   S/p pituitary adenoma and transnasal resection at the age of 26    HPI   Headaches for years since pregnant with oldest child 29 years ago   Will soon start Tx with a new med not approved to use in the [place].     3-4/week and typically sees \"spots and lights\" and a predictor that in a few hours will get a headache  and consistently starts in the morning and treats with acetaminophen occasionally works and typically uses zomig a couple times week -2-3/week. Duration -up to 48 hours  Low grade headache for years   Associated with Light sensitivity, nausea and vomiting   Headaches worsen with laying flat and wakes 2-3 am to sit up because headache gets worse for a year. Got a new bed but it did not help and sleeps in the chair/recliner sometimes  Noticed vision blurry and lightheadiness, gets irritated.   Pain " "feels sharp and temples -feels like \"pulsating\" and temples enlarged. Sometimes in the back of the head and skull area. Ice pack on the forehead   Triggers strobe lights, fire engines,  extreme heat.     FH -mother, father, sister, nephews, uncles.     Amitriptyline for 15 years and stopped   Topiramate  Sumatriptan   lyrica-\"brain fog\"  depakote -allergies  Compazine +benedryl  Promethazine IV three times per week for   scopalamine patch      Objective    Vitals - Patient Reported  Weight (Patient Reported): 64.9 kg (143 lb)  Pain Score: Moderate Pain (4)  Pain Loc: Abdomen      Physical Exam   GENERAL: Healthy, alert and no distress  EYES: Eyes grossly normal to inspection.RESP: No audible wheeze, cough, or visible cyanosis.  No visible retractions or increased work of breathing.    SKIN: Visible skin clear.   NEURO: Face is symmetrical and symmetrical facial expressions, no apperent weakness Mentation and speech appropriate for age.  PSYCH: Mentation appears normal, affect normal, judgement and insight intact, normal speech and appearance well-groomed.    I discussed all my recommendations with Dinora Mcghee who verbalizes understanding and comfortable with the plan.  All of patient's questions were answered from the best of my knowledge.  Patient is in agreement with the plan.     62 minutes spent on the date of the encounter doing video access, chart  review, results review,  meds review, treatment plan, documentation and further activities as noted above      NATALY Andrew, CNP [facility]  Headache certified  [facility]  "

## 2022-08-04 ENCOUNTER — CARE COORDINATION (OUTPATIENT)
Dept: GASTROENTEROLOGY | Facility: CLINIC | Age: 57
End: 2022-08-04

## 2022-08-04 DIAGNOSIS — K31.84 GASTROPARESIS: ICD-10-CM

## 2022-08-04 LAB
ATRIAL RATE - MUSE: 84 BPM
DIASTOLIC BLOOD PRESSURE - MUSE: NORMAL MMHG
INTERPRETATION ECG - MUSE: NORMAL
P AXIS - MUSE: 69 DEGREES
PR INTERVAL - MUSE: 138 MS
QRS DURATION - MUSE: 80 MS
QT - MUSE: 396 MS
QTC - MUSE: 467 MS
R AXIS - MUSE: 41 DEGREES
SYSTOLIC BLOOD PRESSURE - MUSE: NORMAL MMHG
T AXIS - MUSE: 63 DEGREES
VENTRICULAR RATE- MUSE: 84 BPM

## 2022-08-09 ENCOUNTER — MEDICAL CORRESPONDENCE (OUTPATIENT)
Dept: INTERNAL MEDICINE | Facility: CLINIC | Age: 57
End: 2022-08-09

## 2022-08-09 ENCOUNTER — TELEPHONE (OUTPATIENT)
Dept: ANESTHESIOLOGY | Facility: CLINIC | Age: 57
End: 2022-08-09

## 2022-08-09 DIAGNOSIS — K31.84 GASTROPARESIS: Primary | ICD-10-CM

## 2022-08-09 NOTE — TELEPHONE ENCOUNTER
Patient is scheduled for procedure with Dr. Mahajan    Spoke with: Patient    Date of Procedure: 09-08-22    [place] [facility] [place]    Informed patient they will need an adult  Yes    Pre-procedure COVID-19 Test: @ home    Additional comments: N/A    Patient is aware pre-op RN will call 2-3 days prior to procedure with arrival time and instructions. Yes      Ling Awad on 8/9/2022 at 11:24 AM

## 2022-08-11 DIAGNOSIS — G43.719 INTRACTABLE CHRONIC MIGRAINE WITHOUT AURA AND WITHOUT STATUS MIGRAINOSUS: Primary | ICD-10-CM

## 2022-08-12 ENCOUNTER — HOME INFUSION (PRE-WILLOW HOME INFUSION) (OUTPATIENT)
Dept: PHARMACY | Facility: CLINIC | Age: 57
End: 2022-08-12

## 2022-08-16 DIAGNOSIS — E89.1 POST-PANCREATECTOMY DIABETES (H): ICD-10-CM

## 2022-08-16 DIAGNOSIS — E13.9 POST-PANCREATECTOMY DIABETES (H): ICD-10-CM

## 2022-08-16 DIAGNOSIS — Z90.410 POST-PANCREATECTOMY DIABETES (H): ICD-10-CM

## 2022-08-16 DIAGNOSIS — E16.2 HYPOGLYCEMIA: ICD-10-CM

## 2022-08-20 RX ORDER — HEPARIN SODIUM (PORCINE) LOCK FLUSH IV SOLN 100 UNIT/ML 100 UNIT/ML
5 SOLUTION INTRAVENOUS
Status: CANCELLED | OUTPATIENT
Start: 2022-08-20

## 2022-08-20 RX ORDER — ALBUTEROL SULFATE 0.83 MG/ML
2.5 SOLUTION RESPIRATORY (INHALATION)
Status: CANCELLED | OUTPATIENT
Start: 2022-08-20

## 2022-08-20 RX ORDER — DIPHENHYDRAMINE HYDROCHLORIDE 50 MG/ML
50 INJECTION INTRAMUSCULAR; INTRAVENOUS
Status: CANCELLED
Start: 2022-08-20

## 2022-08-20 RX ORDER — HEPARIN SODIUM,PORCINE 10 UNIT/ML
5 VIAL (ML) INTRAVENOUS
Status: CANCELLED | OUTPATIENT
Start: 2022-08-20

## 2022-08-20 RX ORDER — ALBUTEROL SULFATE 90 UG/1
1-2 AEROSOL, METERED RESPIRATORY (INHALATION)
Status: CANCELLED
Start: 2022-08-20

## 2022-08-20 RX ORDER — EPINEPHRINE 1 MG/ML
0.3 INJECTION, SOLUTION, CONCENTRATE INTRAVENOUS EVERY 5 MIN PRN
Status: CANCELLED | OUTPATIENT
Start: 2022-08-20

## 2022-08-20 RX ORDER — NALOXONE HYDROCHLORIDE 0.4 MG/ML
0.2 INJECTION, SOLUTION INTRAMUSCULAR; INTRAVENOUS; SUBCUTANEOUS
Status: CANCELLED | OUTPATIENT
Start: 2022-08-20

## 2022-08-22 LAB
ATRIAL RATE - MUSE: 83 BPM
DIASTOLIC BLOOD PRESSURE - MUSE: NORMAL MMHG
INTERPRETATION ECG - MUSE: NORMAL
P AXIS - MUSE: 63 DEGREES
PR INTERVAL - MUSE: 138 MS
QRS DURATION - MUSE: 80 MS
QT - MUSE: 380 MS
QTC - MUSE: 446 MS
R AXIS - MUSE: 35 DEGREES
SYSTOLIC BLOOD PRESSURE - MUSE: NORMAL MMHG
T AXIS - MUSE: 61 DEGREES
VENTRICULAR RATE- MUSE: 83 BPM

## 2022-08-29 ENCOUNTER — ANCILLARY PROCEDURE (OUTPATIENT)
Dept: MRI IMAGING | Facility: CLINIC | Age: 57
End: 2022-08-29
Attending: NURSE PRACTITIONER
Payer: COMMERCIAL

## 2022-08-29 DIAGNOSIS — K31.84 GASTROPARESIS: Primary | ICD-10-CM

## 2022-08-29 DIAGNOSIS — Z94.83 S/P PANCREATIC ISLET CELL TRANSPLANTATION (H): ICD-10-CM

## 2022-08-29 DIAGNOSIS — Z98.890 HISTORY OF PITUITARY SURGERY: ICD-10-CM

## 2022-08-29 DIAGNOSIS — R51.0 POSITIONAL HEADACHE: ICD-10-CM

## 2022-08-29 PROCEDURE — A9585 GADOBUTROL INJECTION: HCPCS | Performed by: STUDENT IN AN ORGANIZED HEALTH CARE EDUCATION/TRAINING PROGRAM

## 2022-08-29 PROCEDURE — 70553 MRI BRAIN STEM W/O & W/DYE: CPT | Performed by: STUDENT IN AN ORGANIZED HEALTH CARE EDUCATION/TRAINING PROGRAM

## 2022-08-29 RX ORDER — GADOBUTROL 604.72 MG/ML
7.5 INJECTION INTRAVENOUS ONCE
Status: COMPLETED | OUTPATIENT
Start: 2022-08-29 | End: 2022-08-29

## 2022-08-29 RX ADMIN — GADOBUTROL 6.5 ML: 604.72 INJECTION INTRAVENOUS at 12:39

## 2022-09-07 ENCOUNTER — LAB (OUTPATIENT)
Dept: LAB | Facility: CLINIC | Age: 57
End: 2022-09-07

## 2022-09-07 ENCOUNTER — OFFICE VISIT (OUTPATIENT)
Dept: GASTROENTEROLOGY | Facility: CLINIC | Age: 57
End: 2022-09-07
Payer: COMMERCIAL

## 2022-09-07 VITALS
HEIGHT: 67 IN | SYSTOLIC BLOOD PRESSURE: 129 MMHG | DIASTOLIC BLOOD PRESSURE: 88 MMHG | OXYGEN SATURATION: 98 % | WEIGHT: 146.6 LBS | BODY MASS INDEX: 23.01 KG/M2 | HEART RATE: 91 BPM

## 2022-09-07 DIAGNOSIS — Z90.410 POST-PANCREATECTOMY DIABETES (H): ICD-10-CM

## 2022-09-07 DIAGNOSIS — N95.2 ATROPHIC VAGINITIS: ICD-10-CM

## 2022-09-07 DIAGNOSIS — Z90.410 HISTORY OF PANCREATECTOMY: Primary | ICD-10-CM

## 2022-09-07 DIAGNOSIS — E89.1 POST-PANCREATECTOMY DIABETES (H): ICD-10-CM

## 2022-09-07 DIAGNOSIS — E13.9 POST-PANCREATECTOMY DIABETES (H): ICD-10-CM

## 2022-09-07 DIAGNOSIS — K31.84 GASTROPARESIS: ICD-10-CM

## 2022-09-07 PROCEDURE — 99215 OFFICE O/P EST HI 40 MIN: CPT | Performed by: INTERNAL MEDICINE

## 2022-09-07 RX ORDER — ESTRADIOL 10 UG/1
INSERT VAGINAL
Qty: 24 TABLET | Refills: 2 | Status: CANCELLED | OUTPATIENT
Start: 2022-09-07

## 2022-09-07 RX ORDER — HYDROMORPHONE HYDROCHLORIDE 1 MG/ML
1 SOLUTION ORAL EVERY 6 HOURS PRN
Qty: 4 ML | Refills: 0 | Status: SHIPPED | OUTPATIENT
Start: 2022-09-07 | End: 2022-09-19

## 2022-09-07 ASSESSMENT — PAIN SCALES - GENERAL: PAINLEVEL: SEVERE PAIN (7)

## 2022-09-07 NOTE — NURSING NOTE
"Chief Complaint   Patient presents with     Follow Up       Vitals:    09/07/22 1523   BP: 129/88   Pulse: 91   SpO2: 98%   Weight: 66.5 kg (146 lb 9.6 oz)   Height: 1.702 m (5' 7\")       Body mass index is 22.96 kg/m .    Rebecca Maier MA    "

## 2022-09-07 NOTE — LETTER
9/7/2022         RE: Dinora Mcghee  [address] [facility] [address]        Dear Colleague,    Thank you for referring your patient, Dinora Mcghee, to the [facility] [place]. Please see a copy of my visit note below.    Dinora is here for an in person visit as part of protocol for domperidone IND.  She was started on the 10 mg 4 times daily dose after careful review of potential drug interactions and EKGs for QTC and QT interval.  She did start that dosing for 2 weeks felt really much better or at least significantly so given her very heavy symptom burden with venting her G-tube overall unwellness bloating.  As of the last week though she is felt worse again and particularly notices that she is fine with fasting and typically somewhat after drinking or eating varying quantities or types of liquids or foods are cut will just shut down she develops intense bloating and actually has a protruding lump above her G-tube site extending up over her costal margin.  Its very tender and painful and its associated with absence of bowel movements and absence of bowel sounds per local healthcare provider exam.  She is obviously distressed about this.    Physical exam showed overall relatively intact appearing but obviously under distress and in pain.  Chest is clear to auscultation, cardiac without murmurs rubs gallops and extremities without edema.  Abdomen in the supine position was very tender especially along the upper abdomen really exquisitely tender to light touch.  I could not appreciate a hernia per se and the protrusion that she described was not obvious but as she stated it comes and goes PEG site appeared intact and not infected    She reports no adverse effects from domperidone.    Impression: We spent 40 minutes in person counseling and physical exam.  #1 seemingly partial temporary response to lowest starting dose of domperidone.  Given unchanged EKG and absence of  adverse side effects it was felt safe to increase her domperidone to 20 mg 4 times daily with close follow-up per protocol  #2 unclear whether she has a hernia but not clear and it seems more perhaps myofascial pain.  #3 fortunately she has a follow-up with Dr. Mahajan of our interventional pain service tomorrow and plan is for superficial blocks.  We will certainly hope that provides her some relief  #4 gave her a very limited supply of Dilaudid 1 mg/mL total of 4mL to use orally since she does not absorb pills as well and to use only for breakthrough pain  #5 follow-up EKG and visit per protocol of domperidone IND           Ekg from sept 7 shows qt qtcwithin limits of normal <60 change from last time   Therefore safe to increase Domperidone from 10mg starting dose which she has tolerated well to 20mg qid     EKG 12-lead complete with read (future)  Order: [id number]   Status: Edited Result - FINAL     Visible to patient: Yes (seen)     Next appt: 11/09/2022 at 03:00 PM in Gastroenterology (Mandeep Park MD)     Dx: Gastroparesis     0 Result Notes      Component Ref Range & Units 9/7/22  2:05 PM 8/19/22  6:44 AM    Systolic Blood Pressure mmHg   VC     Diastolic Blood Pressure mmHg   VC     Ventricular Rate BPM 80  83 VC     Atrial Rate BPM 80  83 VC     DC Interval ms 152  138 VC     QRS Duration ms 74  80 VC     QT ms 394  380 VC     QTc ms 454  446 VC     P Axis degrees 47  63 VC     R AXIS degrees 22  35 VC     T Axis degrees 44  61 VC     Interpretation ECG  Sinus rhythm   Normal ECG   When compared with ECG of 19-AUG-2022 06:44,   No significant change was found   Confirmed by MD NISH, ANISHA ([id number]) on 9/9/2022 5:07:00 AM  Sinus rhythm   Normal ECG     Confirmed by MD LOZANO DAVID ([id number]) on 8/19/2022 1:12:23 PM   Also confirmed by MD LOZANO DAVID ([id number]),  Estrellita Meehan ([id number])  on 8/22/2022 8:46:52 AM  VC

## 2022-09-08 ENCOUNTER — TELEPHONE (OUTPATIENT)
Dept: GASTROENTEROLOGY | Facility: CLINIC | Age: 57
End: 2022-09-08

## 2022-09-08 ENCOUNTER — CARE COORDINATION (OUTPATIENT)
Dept: GASTROENTEROLOGY | Facility: CLINIC | Age: 57
End: 2022-09-08

## 2022-09-08 ENCOUNTER — HOSPITAL ENCOUNTER (OUTPATIENT)
Facility: AMBULATORY SURGERY CENTER | Age: 57
Discharge: HOME | End: 2022-09-08
Attending: ANESTHESIOLOGY | Admitting: ANESTHESIOLOGY
Payer: COMMERCIAL

## 2022-09-08 VITALS
RESPIRATION RATE: 16 BRPM | HEART RATE: 86 BPM | BODY MASS INDEX: 22.91 KG/M2 | SYSTOLIC BLOOD PRESSURE: 120 MMHG | OXYGEN SATURATION: 97 % | DIASTOLIC BLOOD PRESSURE: 78 MMHG | WEIGHT: 146 LBS | TEMPERATURE: 98.3 F | HEIGHT: 67 IN

## 2022-09-08 DIAGNOSIS — K31.84 GASTROPARESIS: Primary | ICD-10-CM

## 2022-09-08 PROCEDURE — 76942 ECHO GUIDE FOR BIOPSY: CPT | Mod: 26 | Performed by: ANESTHESIOLOGY

## 2022-09-08 PROCEDURE — 20552 NJX 1/MLT TRIGGER POINT 1/2: CPT

## 2022-09-08 PROCEDURE — 20552 NJX 1/MLT TRIGGER POINT 1/2: CPT | Performed by: ANESTHESIOLOGY

## 2022-09-08 RX ORDER — PROMETHAZINE HYDROCHLORIDE 25 MG/1
25 TABLET ORAL AT BEDTIME
Status: ON HOLD | COMMUNITY
Start: 2022-05-09 | End: 2023-02-02

## 2022-09-08 RX ORDER — BUPIVACAINE HYDROCHLORIDE 2.5 MG/ML
INJECTION, SOLUTION INFILTRATION; PERINEURAL PRN
Status: DISCONTINUED | OUTPATIENT
Start: 2022-09-08 | End: 2022-09-08 | Stop reason: HOSPADM

## 2022-09-08 RX ORDER — LIDOCAINE HYDROCHLORIDE 10 MG/ML
INJECTION, SOLUTION EPIDURAL; INFILTRATION; INTRACAUDAL; PERINEURAL PRN
Status: DISCONTINUED | OUTPATIENT
Start: 2022-09-08 | End: 2022-09-08 | Stop reason: HOSPADM

## 2022-09-08 RX ORDER — ZOLMITRIPTAN 5 MG/1
TABLET, FILM COATED ORAL
Status: ON HOLD | COMMUNITY
Start: 2022-09-02 | End: 2023-01-04

## 2022-09-08 NOTE — TELEPHONE ENCOUNTER
[facility]    Phone Message    May a detailed message be left on voicemail: yes     Reason for Call: Requesting Results   Name/type of test: EKG  Date of test: 9/7/2022  Was test done at a location other than [facility] (Please fill in the location if not [facility])?: No      Action Taken: Other: routed to cardiology    Travel Screening: Not Applicable

## 2022-09-08 NOTE — DISCHARGE INSTRUCTIONS
"PAIN INJECTION HOME CARE INSTRUCTIONS  Activity  -You may resume most normal activity levels with the exception of strenuous activity. It may help to move in ways that hurt before the injection, to see if the pain is still there, but do not overdo it.     -DO NOT remove bandaid for 24 hours  -DO NOT shower for 24 hours      Pain  -You may feel immediate pain relief and numbness for a period of time after the injection. This may indicate the medication has reached the right spot.  -Your pain may return after this short pain-free period, or may even be a little worse for a day or two. It may be caused by needle irritation or by the medication itself. The medications usually take two or three days to start working, but can take as long as a week.    -You may use an ice pack for 20 minutes every 2 hours for the first 24 hours  -You may use a heating pad after the first 24 hours  -You may use Tylenol (acetaminophen) every 4 hours or other pain medicines as directed by your physician      DID YOU RECEIVE SEDATION TODAY?  {YES / NO:959943::\"Yes\"}    If you received sedation please follow these additional safety measures.    Sedation medicine, if given, may remain active for many hours. It is important for the next 24 hours that you do not:  -Drive a car  -Operate machines or power tools  -Consume alcohol, including beer  -Sign any important papers or legal documents    DID YOU RECEIVE STEROIDS TODAY?  {YES / NO:964793}    Common side effects of steroids:  Not everyone will experience corticosteroid side effects. If side effects are experienced, they will gradually subside in the 7-10 day period following an injection. Most common side effects include:  -Flushed face and/or chest  -Feeling of warmth, particularly in the face but could be an overall feeling of warmth  -Increased blood sugar in diabetic patients  -Menstrual irregularities my occur. If taking hormone-based birth control an alternate method of birth control is " recommended  -Sleep disturbances and/or mood swings are possible  -Leg cramps    PLEASE KEEP TRACK OF YOUR SYMPTOMS AND NOTE ANY CHANGES FOR YOUR DOCTOR.       Please contact us if you have:  -Severe pain  -Fever more than 101.5 degrees Fahrenheit  -Signs of infection at the injection site (redness, swelling, or drainage)      FOR PAIN CENTER PATIENTS:  If you have questions, please contact the Pain Clinic at [phone] Option #1 between the hours of 7:00 am and 3:00 pm Monday through Friday. After office hours you can contact the on call provider by dialing [phone]. If you need immediate attention, we recommend that you go to a hospital emergency room or dial [phone].

## 2022-09-08 NOTE — PROGRESS NOTES
Dinora is here for an in person visit as part of protocol for domperidone IND.  She was started on the 10 mg 4 times daily dose after careful review of potential drug interactions and EKGs for QTC and QT interval.  She did start that dosing for 2 weeks felt really much better or at least significantly so given her very heavy symptom burden with venting her G-tube overall unwellness bloating.  As of the last week though she is felt worse again and particularly notices that she is fine with fasting and typically somewhat after drinking or eating varying quantities or types of liquids or foods are cut will just shut down she develops intense bloating and actually has a protruding lump above her G-tube site extending up over her costal margin.  Its very tender and painful and its associated with absence of bowel movements and absence of bowel sounds per local healthcare provider exam.  She is obviously distressed about this.    Physical exam showed overall relatively intact appearing but obviously under distress and in pain.  Chest is clear to auscultation, cardiac without murmurs rubs gallops and extremities without edema.  Abdomen in the supine position was very tender especially along the upper abdomen really exquisitely tender to light touch.  I could not appreciate a hernia per se and the protrusion that she described was not obvious but as she stated it comes and goes PEG site appeared intact and not infected    She reports no adverse effects from domperidone.    Impression: We spent 40 minutes in person counseling and physical exam.  #1 seemingly partial temporary response to lowest starting dose of domperidone.  Given unchanged EKG and absence of adverse side effects it was felt safe to increase her domperidone to 20 mg 4 times daily with close follow-up per protocol  #2 unclear whether she has a hernia but not clear and it seems more perhaps myofascial pain.  #3 fortunately she has a follow-up with Dr. Mahajan of  our interventional pain service tomorrow and plan is for superficial blocks.  We will certainly hope that provides her some relief  #4 gave her a very limited supply of Dilaudid 1 mg/mL total of 4mL to use orally since she does not absorb pills as well and to use only for breakthrough pain  #5 follow-up EKG and visit per protocol of domperidone IND           Ekg from sept 7 shows qt qtcwithin limits of normal <60 change from last time   Therefore safe to increase Domperidone from 10mg starting dose which she has tolerated well to 20mg qid     EKG 12-lead complete with read (future)  Order: [id number]   Status: Edited Result - FINAL     Visible to patient: Yes (seen)     Next appt: 11/09/2022 at 03:00 PM in Gastroenterology (Mandeep Park MD)     Dx: Gastroparesis     0 Result Notes      Component Ref Range & Units 9/7/22  2:05 PM 8/19/22  6:44 AM    Systolic Blood Pressure mmHg   VC     Diastolic Blood Pressure mmHg   VC     Ventricular Rate BPM 80  83 VC     Atrial Rate BPM 80  83 VC     MT Interval ms 152  138 VC     QRS Duration ms 74  80 VC     QT ms 394  380 VC     QTc ms 454  446 VC     P Axis degrees 47  63 VC     R AXIS degrees 22  35 VC     T Axis degrees 44  61 VC     Interpretation ECG  Sinus rhythm   Normal ECG   When compared with ECG of 19-AUG-2022 06:44,   No significant change was found   Confirmed by MD NISH, ANISHA ([id number]) on 9/9/2022 5:07:00 AM  Sinus rhythm   Normal ECG     Confirmed by MD LOZANO DAVID ([id number]) on 8/19/2022 1:12:23 PM   Also confirmed by MD LOZANO DAVID ([id number]),  Estrellita Meehan ([id number])  on 8/22/2022 8:46:52 AM  VC

## 2022-09-08 NOTE — OP NOTE
PROCEDURE REPORT    Chief Complaint:  Chief complaint of: abdominal pain    Pre-Operative Diagnosis:  1. Bilateral upper quadrant abdominal pain  2. Myalgia, unspecified site    Post-Operative Diagnosis:  1. Bilateral upper quadrant abdominal pain  2. Myalgia, unspecified site    Procedure:  Bilateral abdominal trigger point injection  Ultrasound for needle guidance     Indications / Pre-Operative Plan:  The patient has disabling myofascial pain, therefore, a trigger point injection will be performed. The risks, alternatives and benefits were explained to the patient in detail. The patient agreed with the plan.   Patient was examined by me prior to the procedure. Examination of heart, lung and mental status were all within acceptable limits. The patient has been assessed, examined and cleared for the planned procedure and level of anesthesia in an ambulatory surgery center.      Ultrasound guidance: The use of direct ultrasound visualization of the needle was required (rather than a non-guided injection) to ensure accurate injection delivery and to maximize clinical benefit beyond that obtained with a non-guided injection. Additionally, there can be diagnostic specificity when evaluating effectiveness of the injection, and for safety purposes to minimize risk of bleeding or injury to surrounding structures. Images have been archived in the patients chart.  Ultrasound probe (MHz): linear high-frequency  Needle visualization: in-plane  Needle approach: lateral to medial    Description of Procedure:  After informed consent, the patient was placed in the supine position. A duplex ultrasound examination was performed with a 15Hz transducer. I marked trigger points in the abdomen and prepped and draped in the usual sterile fashion. The local skin anesthetic lidocaine 1% was used for this procedure. I then scanned the trigger point(s) individually and used a 22g, 3.5 in. needle under live ultrasound guidance. I injected a  total of 2 separate muscle groups. I injected a total dose 5 mL bupivacaine 0.25% divided equally among the trigger points injected. All needles were placed easily, with minimal trauma. The patient did not experience any side effects after the procedure and was discharged home after monitoring.    Post-Operative Plan:  Follow up in clinic in 3-4 weeks.

## 2022-09-08 NOTE — PROGRESS NOTES
Per Dr. Park  Ekg from sept 7 shows qt qtc well within limits of normal <60 from last time   Therefore safe to increase Domperidone from 10mg starting dose which she has tolerated well to 20mg qid     Meds ordered and sent to pharmacy electronically    New EKG ordered, pt needs to get new EKG 3-7 days after starting new dose, EKG to be timed 1 hour after the first domperidone dose of the day. 2 month follow up visit scheduled per protocol with Dr.Freeman Moody sent to patient.    ML

## 2022-09-08 NOTE — H&P
Dinora GAINES [facility] [place]  [id number]  female  57 year old      Reason for procedure/surgery: myofascial pain    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 endometriosis growth     APPENDECTOMY        SECTION       COLONOSCOPY       ENDOSCOPIC INSERTION TUBE GASTROSTOMY N/A 2021    Procedure: Gastrojejonostomy placement with jejunopexy, PEG tube exchange;  Surgeon: Zackery Montoya MD;  Location:  OR     ENDOSCOPIC RETROGRADE CHOLANGIOPANCREATOGRAM N/A 2015    Procedure: ENDOSCOPIC RETROGRADE CHOLANGIOPANCREATOGRAM;  Surgeon: Mandeep Park MD;  Location:  OR     ENDOSCOPIC RETROGRADE CHOLANGIOPANCREATOGRAM N/A 2016    Procedure: COMBINED ENDOSCOPIC RETROGRADE CHOLANGIOPANCREATOGRAPHY, PLACE TUBE/STENT;  Surgeon: Mandeep Park MD;  Location:   OR     ENDOSCOPIC RETROGRADE CHOLANGIOPANCREATOGRAM N/A 3/17/2016    Procedure: COMBINED ENDOSCOPIC RETROGRADE CHOLANGIOPANCREATOGRAPHY, REMOVE FOREIGN BODY OR STENT/TUBE;  Surgeon: Mandeep Park MD;  [facility]     ENDOSCOPIC RETROGRADE CHOLANGIOPANCREATOGRAM N/A 8/2/2016    Procedure: COMBINED ENDOSCOPIC RETROGRADE CHOLANGIOPANCREATOGRAPHY, PLACE TUBE/STENT;  Surgeon: Mandeep Park MD;  [facility]     ENDOSCOPIC RETROGRADE CHOLANGIOPANCREATOGRAM N/A 8/26/2016    Procedure: COMBINED ENDOSCOPIC RETROGRADE CHOLANGIOPANCREATOGRAPHY, REMOVE FOREIGN BODY OR STENT/TUBE;  Surgeon: Mandeep Park MD;  [facility]     ENDOSCOPIC ULTRASOUND UPPER GASTROINTESTINAL TRACT (GI) N/A 10/3/2016    Procedure: ENDOSCOPIC ULTRASOUND, ESOPHAGOSCOPY / UPPER GASTROINTESTINAL TRACT (GI);  Surgeon: Guru Jose Rodas MD;  [facility]     ENTEROSCOPY SMALL BOWEL N/A 2/3/2022    Procedure: ENTEROSCOPY with possible fistula closure;  Surgeon: Francisco Dodson MD;  Location:  GI     ESOPHAGOSCOPY, GASTROSCOPY, DUODENOSCOPY (EGD), COMBINED N/A 6/24/2015    Procedure: COMBINED ESOPHAGOSCOPY, GASTROSCOPY, DUODENOSCOPY (EGD), REMOVE FOREIGN BODY;  Surgeon: Mandeep Park MD;  Location:  GI     ESOPHAGOSCOPY, GASTROSCOPY, DUODENOSCOPY (EGD), COMBINED N/A 10/25/2015    Procedure: COMBINED ESOPHAGOSCOPY, GASTROSCOPY, DUODENOSCOPY (EGD);  Surgeon: Sammy Amaro MD;  Location:  GI     ESOPHAGOSCOPY, GASTROSCOPY, DUODENOSCOPY (EGD), COMBINED N/A 10/25/2015    Procedure: COMBINED ESOPHAGOSCOPY, GASTROSCOPY, DUODENOSCOPY (EGD), BIOPSY SINGLE OR MULTIPLE;  Surgeon: Sammy Amaro MD;  Location:  GI     ESOPHAGOSCOPY, GASTROSCOPY, DUODENOSCOPY (EGD), DILATATION, COMBINED       EXCISE LESION TRUNK N/A 4/17/2017    Procedure: EXCISE LESION TRUNK;  Removal of Abdominal Foreign Body;  Surgeon: Nestor Phoenix MD;  [facility]     HC ESOPH/GAS REFLUX TEST W NASAL IMPED  >1 HR N/A 11/19/2015    Procedure: ESOPHAGEAL IMPEDENCE FUNCTION TEST WITH 24 HOUR PH GREATER THAN 1 HOUR;  Surgeon: Thiago Apple MD;  Location[facility] GI     HC UGI ENDOSCOPY DIAG W BIOPSY  9/17/08     HC UGI ENDOSCOPY DIAG W BIOPSY  9/27/12     HC UGI ENDOSCOPY W ESOPHAGEAL DILATION BALLOON <30MM  9/17/08     HC UGI ENDOSCOPY W EUS N/A 5/5/2015    Procedure: COMBINED ENDOSCOPIC ULTRASOUND, ESOPHAGOSCOPY, GASTROSCOPY, DUODENOSCOPY (EGD);  Surgeon: Wm Dueñas MD;  [facility] GI     HC WRIST ARTHROSCOP,RELEASE XVERS LIG Bilateral 12/17/08     INJECT TRANSVERSUS ABDOMINIS PLANE (TAP) BLOCK BILATERAL Left 9/22/2016    Procedure: INJECT TRANSVERSUS ABDOMINIS PLANE (TAP) BLOCK BILATERAL;  Surgeon: Dickson Corrigan MD;  [facility]     IR CHEST PORT PLACEMENT < 5 YRS OF AGE  6/10/2022     laparoscopic pineda  1995     LAPAROSCOPIC HERNIORRHAPHY INCISIONAL N/A 8/23/2018    Procedure: LAPAROSCOPIC HERNIORRHAPHY INCISIONAL;  Laparoscopic Incisional Hernia Repair with Symbotex Mesh Implant;  Surgeon: Nestor Phoenix MD;  [facility]     LAPAROSCOPIC PANCREATECTOMY, TRANSPLANT AUTO ISLET CELL N/A 12/28/2016    Procedure: LAPAROSCOPIC PANCREATECTOMY, TRANSPLANT AUTO ISLET CELL;  Surgeon: Nestor Phoenix MD;  [facility]     REMOVE GASTROSTOMY TUBE ADULT N/A 1/28/2022    Procedure: REMOVAL, GASTROSTOMY TUBE, ADULT;  Surgeon: Mandeep Park MD;  [facility]     REPLACE GASTROJEJUNOSTOMY TUBE, PERCUTANEOUS N/A 9/7/2021    Procedure: GASTROJEJUNOSTOMY TUBE PLACEMENT, PERCUTANEOUS, WITH GASTROPEXY;  Surgeon: Mandeep Park MD;  [facility]     REPLACE GASTROJEJUNOSTOMY TUBE, PERCUTANEOUS N/A 9/22/2021    Procedure: REPLACEMENT, GASTROJEJUNOSTOMY TUBE, PERCUTANEOUS;  Surgeon: Zackery Montoya MD;  [facility]     REPLACE JEJUNOSTOMY TUBE, PERCUTANEOUS N/A 9/10/2021    Procedure: UPPER ENDOSCOPY, REPLACEMENT OF PERCUTANEOUS GASTROJEJUNOSTOMY TUBE, T-TAG GASTROPEXY;   Surgeon: Zackery Montoya MD;  [facility]     REPLACE JEJUNOSTOMY TUBE, PERCUTANEOUS N/A 2021    Procedure: REPLACEMENT, JEJUNOSTOMY TUBE, PERCUTANEOUS;  Surgeon: Mandeep Park MD;  [facility] OR     transphenoidal pituitary resection       [facility]  DELIVERY ONLY       [facility]  DELIVERY ONLY      repeat c section with incidental cystotomy with repair     [facility] EXCIS PITUITARY,TRANSNASAL/SEPTAL      pituitary tumor removed for diabetes insipidus     [facility] TOTAL ABDOM HYSTERECTOMY      w/ bilateral salpingoophorectomy        Past Medical History:   Past Medical History:   Diagnosis Date     Allergic rhinitis, cause unspecified      Allergy to other foods     strawberries, apples, celeries, alice, watermelon     Arthritis     left knee     Choledocholithiasis     long after cholecystectomy     Chronic abdominal pain      Chronic constipation      Chronic nausea      Chronic pancreatitis (H)      Degeneration of lumbar or lumbosacral intervertebral disc      Esophageal reflux     w/ hiatal hernia     Gastroparesis      Hiatal hernia      History of pituitary adenoma     s/p resection     Hypothyroidism      Migraines      Mild hyperlipidemia      On tube feeding diet     presence of GJ tube     Pancreatic disease     PD stricture, 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      PONV (postoperative nausea and vomiting)      Portacath in place      Unspecified hearing loss     25% high frequency R       Social History:   Social History     Tobacco Use     Smoking status: Former Smoker     Packs/day: 0.50     Years: 6.00     Pack years: 3.00     Types: Cigarettes     Start date: 1985     Quit date: 1992     Years since quittin.7     Smokeless tobacco: Never Used     Tobacco comment: no 2nd hand   Substance Use Topics     Alcohol use: Not Currently     Alcohol/week: 3.0 - 6.0 standard drinks     Types: 1 - 2 Glasses of wine, 1 - 2 Cans of beer, 1 - 2 Shots of  liquor per week     Comment: none since IGG       Family History:   Family History   Problem Relation Age of Onset     Lipids Mother      Hypertension Mother      Thyroid Disease Mother      Depression Mother      Angina Mother      GERD Mother      Skin Cancer Mother      Migraines Mother      Eye Disorder Father         cataract, detached retina     Myocardial Infarction Father 60     Lipids Father      Cerebrovascular Disease Father      Depression Father      Substance Abuse Father      Anesthesia Reaction Father         stroke right after surgery     Cataracts Father      Osteoarthritis Father      Ulcerative Colitis Father      Interpersonal Violence Sister      Coronary Artery Disease Sister         angioplasty     GERD Sister      Substance Abuse Sister      Depression Sister      Thyroid Disease Sister      Eye Disorder Maternal Grandmother         cataract     Thyroid Disease Maternal Grandmother      Diabetes Maternal Grandfather      Eye Disorder Paternal Grandmother         cataract     Eye Disorder Paternal Grandfather         cataract     Diabetes Paternal Grandfather      Eye Disorder Son         ptosis     Depression Son      Anxiety Disorder Son      Heart Disease Paternal Aunt      Diabetes Paternal Aunt      Diabetes Paternal Uncle      Heart Disease Paternal Uncle      Depression Nephew      Anxiety Disorder Nephew      Thyroid Disease Nephew      Diabetes Type 2  Cousin         paternal cousin       Allergies:   Allergies   Allergen Reactions     Apple Anaphylaxis     Corticosteroids Other (See Comments)     All oral, IV and injectable steroids are contraindicated (unless in life threatening situations) in [facility] transplant recipients. They can cause irreversible loss of islet cell function. Please contact patient's transplant care coordinator ADI Gaffney RN at [phone]/pager [phone] and/or endocrinologist prior to administration.       Depakote [Divalproex Sodium] Other (See  Comments)     Chest pain     Zithromax [Azithromycin Dihydrate] Anaphylaxis     Noted in 4/7/08 ER     Bromfenac      Other reaction(s): Headache     Codeine      Other reaction(s): Hallucinations     Darvocet [Propoxyphene N-Apap] Itching     Hydromorphone Other (See Comments)     Loopy     Morphine Nausea and Vomiting and Rash     Nalbuphine Hcl Rash     RASH :nubaine     Zosyn [Piperacillin-Tazobactam In D5w] Rash     Possible allergy, did have a diffuse rash that seemed drug related but could have also been related to soap in the hospital.      Bactrim [Sulfamethoxazole W-Trimethoprim] Other (See Comments) and Nausea and Vomiting     Severely low liver function.     Compazine [Prochlorperazine] Other (See Comments)     Twitching. Takes Benedryl and is fine     Tape [Adhesive Tape] Blisters     Tramadol      Zofran [Ondansetron] Other (See Comments)     migraine     Flagyl [Metronidazole] Hives and Rash       Active Medications:   Current Outpatient Medications   Medication Sig Dispense Refill     acarbose (PRECOSE) 50 MG tablet Take 1 tablet (50 mg) by mouth 3 times daily (with meals) Increase to 100 mg three times daily as needed. 180 tablet 5     amylase-lipase-protease (CREON) 65171-50384 units CPEP per EC capsule Take 3-4 capsules by mouth 3 times daily (with meals) With oral meals 150 capsule 11     cetirizine (ZYRTEC) 10 MG tablet Take 1 tablet (10 mg) by mouth daily 90 tablet 3     diphenhydrAMINE (BENADRYL ALLERGY) 25 MG capsule Take 1 capsule (25 mg) by mouth every 6 hours as needed for itching or allergies 56 capsule 0     DOMPERIDONE 10 MG TAB/CAP Patient has not started this medication yet, but will start within the next two weeks per patient.       estradiol (VAGIFEM) 10 MCG TABS vaginal tablet INSERT 1 TABLET(10 MCG) VAGINALLY 2 TIMES A WEEK 24 tablet 2     famotidine (PEPCID) 20 MG tablet Take 1 tablet (20 mg) by mouth daily 90 tablet 3     ibuprofen (ADVIL/MOTRIN) 400 MG tablet Take 1 tablet (400  mg) by mouth every 6 hours as needed for moderate pain 30 tablet 0     levothyroxine (SYNTHROID/LEVOTHROID) 137 MCG tablet Take 1 tablet (137 mcg) by mouth daily 90 tablet 3     methocarbamol (ROBAXIN) 750 MG tablet Take 750 mg by mouth 4 times daily       montelukast (SINGULAIR) 10 MG tablet TAKE 1 TABLET(10 MG) BY MOUTH AT BEDTIME 90 tablet 3     omeprazole (PRILOSEC) 40 MG DR capsule Take 1 capsule (40 mg) by mouth daily 90 capsule 3     prochlorperazine (COMPAZINE) 10 MG tablet TAKE 1/2 TABLET(5 MG) BY MOUTH EVERY 6 HOURS AS NEEDED FOR NAUSEA OR VOMITING 60 tablet 2     promethazine (PHENERGAN) 25 MG tablet Take 25 mg by mouth       promethazine (PHENERGAN) 25 MG tablet Take 1 tablet (25 mg) by mouth daily as needed (take as needed for nausea/vomiting) 60 tablet 0     Prucalopride Succinate 2 MG TABS Take 1 tablet (2 mg) by mouth daily 30 tablet 11     scopolamine (TRANSDERM) 1 MG/3DAYS 72 hr patch Place 1 patch onto the skin every 72 hours 10 patch 11     ZOLMitriptan (ZOMIG-ZMT) 5 MG ODT Take 1 tablet (5 mg) by mouth at onset of headache for migraine May repeat in 2 hours. Max 2 tablets/24 hours. 18 tablet 6     azelastine (ASTELIN) 0.1 % nasal spray Spray 1 spray into both nostrils 2 times daily 1 Bottle 11     blood glucose (PAT CONTOUR NEXT) test strip Use to test blood sugar 6 times daily or as directed. 2 Box 11     blood glucose monitoring (PAT MICROLET) lancets Use to test blood sugar 6-8 times daily or as directed. 100 each 11     Continuous Blood Gluc Sensor (FREESTYLE LISA 2 SENSOR) MISC Inject 1 patch into the skin every 14 days 2 each 11     cyclobenzaprine (FLEXERIL) 10 MG tablet Take 1 tablet (10 mg) by mouth 2 times daily as needed for muscle spasms 90 tablet 3     glucagon 1 MG kit Give 0.1 to 0.15mg( 10-15 units on Insulin sryinge) subcutaneous  every 15 minutes PRN for hypoglycemia. Remix new kit q24hr. Needs up to 3 kit/week. 10 each 3     glucose 40 % GEL gel Take 15-30 g by mouth  "every 15 minutes as needed for low blood sugar 3 Tube 2     Hydroactive Dressings (TEGADERM HYDROCOLLOID THIN) MISC Use under sensor site , change every 14 days 2 each 11     HYDROmorphone, STANDARD CONC, (DILAUDID) 1 MG/ML oral solution Take 1 mL (1 mg) by mouth every 6 hours as needed for breakthrough pain or severe pain 4 mL 0     HYDROmorphone, STANDARD CONC, (DILAUDID) 1 MG/ML oral solution Take 1-2 mLs (1-2 mg) by mouth every 4 hours as needed for moderate to severe pain (Post G and J tube placement) 168 mL 0     insulin syringe-needle U-100 (30G X 1/2\" 0.3 ML) 30G X 1/2\" 0.3 ML miscellaneous Use 3 syringes daily or as directed. 50 each 1     Lidocaine (LIDOCARE) 4 % Patch Place 1 patch onto the skin every 24 hours To prevent lidocaine toxicity, patient should be patch free for 12 hrs daily. 10 patch 0     Multiple Vitamin (MULTIVITAMIN ADULT PO) Take 1 tablet by mouth daily       Nutritional Supplements (BOOST HIGH PROTEIN) LIQD After above baseline labs are drawn, give: 6 mL/kg to maximum of 360 mL; the beverage is to be consumed within 5 minutes.  0     order for DME Equipment being ordered:Cefaly 1 Device 0     Sharps Container (BD SHARPS ) MISC 1 Container as needed 1 each 1     ZOLMitriptan (ZOMIG) 5 MG tablet TAKE 1 TABLET BY MOUTH DAILY AS NEEDED FOR MIGRAINE. ONLY TAKE 2 TABLETS IN A 24 HR PERIOD. MAY USE 1 TO 2 TIMES PER WEEK         Systemic Review:   CONSTITUTIONAL: NEGATIVE for fever, chills, change in weight  ENT/MOUTH: NEGATIVE for ear, mouth and throat problems  RESP: NEGATIVE for significant cough or SOB  CV: NEGATIVE for chest pain, palpitations or peripheral edema    Physical Examination:   Vital Signs: /78   Pulse 88   Temp 98.3  F (36.8  C) (Temporal)   Resp 16   Ht 1.702 m (5' 7\")   Wt 66.2 kg (146 lb)   SpO2 96%   BMI 22.87 kg/m    GENERAL: healthy, alert and no distress  NECK: no adenopathy, no asymmetry, masses, or scars  RESP: lungs clear to auscultation - no " rales, rhonchi or wheezes  CV: regular rate and rhythm, normal S1 S2, no S3 or S4, no murmur, click or rub, no peripheral edema and peripheral pulses strong  ABDOMEN: soft, nontender, no hepatosplenomegaly, no masses and bowel sounds normal  MS: no gross musculoskeletal defects noted, no edema    Plan: Appropriate to proceed as scheduled.      Monika Mahajan MD  9/8/2022

## 2022-09-08 NOTE — TELEPHONE ENCOUNTER
Spoke with Kenya Victor regarding pts request also had RN take a look there is No ekg for 9/7 in epic [place] will follow up with pt accordingly.

## 2022-09-09 ENCOUNTER — TELEPHONE (OUTPATIENT)
Dept: TRANSPLANT | Facility: CLINIC | Age: 57
End: 2022-09-09

## 2022-09-09 DIAGNOSIS — K59.01 SLOW TRANSIT CONSTIPATION: ICD-10-CM

## 2022-09-09 LAB
ATRIAL RATE - MUSE: 80 BPM
DIASTOLIC BLOOD PRESSURE - MUSE: NORMAL MMHG
INTERPRETATION ECG - MUSE: NORMAL
P AXIS - MUSE: 47 DEGREES
PR INTERVAL - MUSE: 152 MS
QRS DURATION - MUSE: 74 MS
QT - MUSE: 394 MS
QTC - MUSE: 454 MS
R AXIS - MUSE: 22 DEGREES
SYSTOLIC BLOOD PRESSURE - MUSE: NORMAL MMHG
T AXIS - MUSE: 44 DEGREES
VENTRICULAR RATE- MUSE: 80 BPM

## 2022-09-09 NOTE — TELEPHONE ENCOUNTER
scopolamine (TRANSDERM) 1 MG/3DAYS 72 hr patch      Last Written Prescription Date:  7/14/21  Last Fill Quantity: 10 patches,   # refills: 11  Last Office Visit : 9/7/22  Future Office visit:  11/9/22    Routing refill request to provider for review/approval because:  Drug not on pancreas and biliary refill protocol

## 2022-09-09 NOTE — TELEPHONE ENCOUNTER
[facility] [employer] following up on a request sent over. When calling back please refer to reference number [id number]

## 2022-09-12 ENCOUNTER — TELEPHONE (OUTPATIENT)
Dept: GASTROENTEROLOGY | Facility: CLINIC | Age: 57
End: 2022-09-12

## 2022-09-12 DIAGNOSIS — R11.0 NAUSEA: Primary | ICD-10-CM

## 2022-09-12 DIAGNOSIS — G43.109 MIGRAINE WITH AURA AND WITHOUT STATUS MIGRAINOSUS, NOT INTRACTABLE: ICD-10-CM

## 2022-09-12 RX ORDER — SCOLOPAMINE TRANSDERMAL SYSTEM 1 MG/1
1 PATCH, EXTENDED RELEASE TRANSDERMAL
Qty: 10 PATCH | Refills: 11 | Status: ON HOLD | OUTPATIENT
Start: 2022-09-12 | End: 2023-01-08

## 2022-09-12 NOTE — TELEPHONE ENCOUNTER
[facility] Call Center    Phone Message    May a detailed message be left on voicemail: yes     Reason for Call: Other: Catarina, a  from [facility], was calling in regards to see who could help the patient replace their G tube and help order G tube supplies so that the patient's insurance will cover it. They explained that the patient is currently paying out of pocket for her G tube and monthly supplies, and that they are hoping that by having 's team order the supplies that they could be covered by her insurance. Please call them back at [phone] to discuss, thank you!     Action Taken: Message routed to:  [facility] Hanane Westfall    Travel Screening: Not Applicable

## 2022-09-13 RX ORDER — ZOLMITRIPTAN 5 MG/1
5 TABLET, ORALLY DISINTEGRATING ORAL
Qty: 18 TABLET | Refills: 6 | Status: ON HOLD | OUTPATIENT
Start: 2022-09-13 | End: 2023-06-07

## 2022-09-13 NOTE — TELEPHONE ENCOUNTER
Dinora reports receiving 1,500 cc LR at the [facility] and that the fluids are helping her feel more hydrated. Capping of her g-tube has been going well. Friday she remained capped all day until 7 PM when she went to bed. She capped 13 hours later and had 800 cc of light greenish-brown, clear with flecks of brown at 8 AM when she woke up.. It was of thin consistency. Dinora walked 1 mile in 30 [place] [facility] She has been capped all day without needing to vent. Upon returning home after fluids and her walk she had 2 large episodes of diarrhea. She states these episodes wear her out. She reports being hungry and taking ice chips as she still feels thirsty. She had no BM on Friday. She stated that her stomach won't stop rumbling. She slept well Friday night with 1 episode of waking up and needing to get more comfortable by sitting in the chair and falling back to sleep.  Lantus dose remained at 20 units per Dr. Melissa.       Quality 226: Preventive Care And Screening: Tobacco Use: Screening And Cessation Intervention: Patient screened for tobacco use and is an ex/non-smoker Detail Level: Detailed

## 2022-09-14 DIAGNOSIS — K31.84 GASTROPARESIS: ICD-10-CM

## 2022-09-15 LAB
ATRIAL RATE - MUSE: 74 BPM
DIASTOLIC BLOOD PRESSURE - MUSE: NORMAL MMHG
INTERPRETATION ECG - MUSE: NORMAL
P AXIS - MUSE: 61 DEGREES
PR INTERVAL - MUSE: 140 MS
QRS DURATION - MUSE: 78 MS
QT - MUSE: 426 MS
QTC - MUSE: 472 MS
R AXIS - MUSE: 37 DEGREES
SYSTOLIC BLOOD PRESSURE - MUSE: NORMAL MMHG
T AXIS - MUSE: 48 DEGREES
VENTRICULAR RATE- MUSE: 74 BPM

## 2022-09-19 ENCOUNTER — MYC MEDICAL ADVICE (OUTPATIENT)
Dept: GASTROENTEROLOGY | Facility: CLINIC | Age: 57
End: 2022-09-19

## 2022-09-19 DIAGNOSIS — K31.84 GASTROPARESIS: Primary | ICD-10-CM

## 2022-09-19 NOTE — TELEPHONE ENCOUNTER
[facility] Call Center    Phone Message    May a detailed message be left on voicemail: yes     Reason for Call: Other: Catarina, a  for [facility] [employer], was reaching out again because she had received a phone message from the team to call back to discuss previous message. Please call her back at [phone], thank you!     Action Taken: Message routed to:  [facility] Hanane Westfall    Travel Screening: Not Applicable

## 2022-09-21 DIAGNOSIS — K31.84 GASTROPARESIS: ICD-10-CM

## 2022-09-23 ENCOUNTER — TELEPHONE (OUTPATIENT)
Dept: GASTROENTEROLOGY | Facility: CLINIC | Age: 57
End: 2022-09-23

## 2022-09-23 NOTE — TELEPHONE ENCOUNTER
[facility] Call Center    Phone Message    May a detailed message be left on voicemail: yes     Reason for Call: Other: Pt requested to schedule a follow up appt w/ Dr. Genao. First available isnt until 4/2023 for both video and in person. Pt requested call back to see if it is possible to be seen earlier. Per pt, please call back at [phone] if no answer it is ok to leave a detailed message.     Action Taken: Other: [facility] gi    Travel Screening: Not Applicable

## 2022-09-23 NOTE — TELEPHONE ENCOUNTER
PT not currently open with [facility], Petr not helping with gtube, supplies need to be ordered through DME- orders sent to  DME    ML

## 2022-09-26 ENCOUNTER — OFFICE VISIT (OUTPATIENT)
Dept: NEUROLOGY | Facility: CLINIC | Age: 57
End: 2022-09-26
Payer: COMMERCIAL

## 2022-09-26 ENCOUNTER — HOME INFUSION (PRE-WILLOW HOME INFUSION) (OUTPATIENT)
Dept: PHARMACY | Facility: CLINIC | Age: 57
End: 2022-09-26

## 2022-09-26 DIAGNOSIS — K31.84 GASTROPARESIS: ICD-10-CM

## 2022-09-26 DIAGNOSIS — Z46.59 ENCOUNTER FOR CARE RELATED TO FEEDING TUBE: Primary | ICD-10-CM

## 2022-09-26 DIAGNOSIS — G43.719 INTRACTABLE CHRONIC MIGRAINE WITHOUT AURA AND WITHOUT STATUS MIGRAINOSUS: Primary | ICD-10-CM

## 2022-09-26 PROBLEM — T50.901A POISONING BY DRUG: Status: ACTIVE | Noted: 2022-05-09

## 2022-09-26 PROBLEM — R76.8 OTHER SPECIFIED ABNORMAL IMMUNOLOGICAL FINDINGS IN SERUM: Status: ACTIVE | Noted: 2017-06-26

## 2022-09-26 PROBLEM — E23.2 DIABETES INSIPIDUS (H): Status: ACTIVE | Noted: 2017-01-05

## 2022-09-26 PROBLEM — E10.9 TYPE 1 DIABETES MELLITUS WITHOUT COMPLICATION (H): Status: ACTIVE | Noted: 2022-05-09

## 2022-09-26 PROBLEM — Z90.81 ACQUIRED ABSENCE OF SPLEEN: Status: ACTIVE | Noted: 2022-05-09

## 2022-09-26 PROCEDURE — 64615 CHEMODENERV MUSC MIGRAINE: CPT | Performed by: NURSE PRACTITIONER

## 2022-09-26 ASSESSMENT — HEADACHE IMPACT TEST (HIT 6)
HIT6 TOTAL SCORE: 68
WHEN YOU HAVE A HEADACHE HOW OFTEN DO YOU WISH YOU COULD LIE DOWN: ALWAYS
WHEN YOU HAVE HEADACHES HOW OFTEN IS THE PAIN SEVERE: VERY OFTEN
HOW OFTEN DO HEADACHES LIMIT YOUR DAILY ACTIVITIES: VERY OFTEN
HOW OFTEN HAVE YOU FELT TOO TIRED TO WORK BECAUSE OF YOUR HEADACHES: VERY OFTEN
HOW OFTEN HAVE YOU FELT FED UP OR IRRITATED BECAUSE OF YOUR HEADACHES: VERY OFTEN
HOW OFTEN DID HEADACHS LIMIT CONCENTRATION ON WORK OR DAILY ACTIVITY: VERY OFTEN

## 2022-09-26 NOTE — PROGRESS NOTES
BOTULINUM NEUROTOXIN INJECTION PROCEDURES:  DATA:9/26/2022  INDICATIONS FOR PROCEDURES:   chronic migraine headaches. The patient  is improving overall in her migraine management and continues to be a good candidate for ongoing Botox.  baseline symptoms have been recalcitrant to oral medications and conservative therapy. Patient is here today for a repeat injection with Botox.    GOAL OF PROCEDURE:  The goal of this procedure is to decrease pain and enhance functional independence.    TOTAL DOSE ADMINISTERED:  Dose Administered:  155 units  Botox (Botulinum Toxin Type A)       2:1 Dilution    Wasted 45 units  Medication guide was offered to patient and was declined.    CONSENT:  The risks, benefits, and treatment options were discussed with the patient who agreed to proceed.    Written consent was obtained     EQUIPMENT USED:  Needles-30 gauge, 0.5 inches for injections  Four 1-ml tuberculin syringes for injections  One sodium chloride 10 ml vial preservative free  Alcohol swabs    SKIN PREPARATION:  Skin preparation was performed using an alcohol wipe.      AREA/MUSCLE INJECTED:  155 units of Botox    Right upper Trapezius (upper cervical) - 5 units of Botox at 3 site/s.   Left upper Trapezius (upper cervical) - 5 units of Botox at 3 site/s.     Right cervical paraspinals - 5 units of Botox at 2 site/s.   Left cervical paraspinals - 5 units of Botox at 2 site/s.     Left occipitalis - 5 units of Botox at 3 site/s.  Right occipitalis -5 units of Botox at 3 site/s      Right Frontalis - 5 units of Botox at 2 site/s.  Left Frontalis - 5 units of Botox at 2 site/s.    Right Temporalis - 5 units of Botox at 4 site/s.  Left Temporalis - 5 units of Botox at 4 site/s.    Right  - 5 units of Botox at 1 site/s.              Left  - 5 units of Botox at 1 site/s.    Procerus - 5 units of Botox at 1 site/s.    RESPONSE TO PROCEDURE:  tolerated the procedure well and there were no immediate complications.   Patient was allowed to recover for an appropriate period of time and was discharged home in stable condition.    FOLLOW UP:    Repeat Botox injections in 12 weeks      This procedure was performed under a hospital privileging agreement with NATALY Bonner, [facility]

## 2022-09-26 NOTE — LETTER
9/26/2022       RE: Dinora Mcghee  [address] [facility] [address]     Dear Colleague,    Thank you for referring your patient, Dinora Mcghee, to the [facility] [place] at [facility]. Please see a copy of my visit note below.      BOTULINUM NEUROTOXIN INJECTION PROCEDURES:  DATA:9/26/2022  INDICATIONS FOR PROCEDURES:   chronic migraine headaches. The patient  is improving overall in her migraine management and continues to be a good candidate for ongoing Botox.  baseline symptoms have been recalcitrant to oral medications and conservative therapy. Patient is here today for a repeat injection with Botox.    GOAL OF PROCEDURE:  The goal of this procedure is to decrease pain and enhance functional independence.    TOTAL DOSE ADMINISTERED:  Dose Administered:  155 units  Botox (Botulinum Toxin Type A)       2:1 Dilution    Wasted 45 units  Medication guide was offered to patient and was declined.    CONSENT:  The risks, benefits, and treatment options were discussed with the patient who agreed to proceed.    Written consent was obtained     EQUIPMENT USED:  Needles-30 gauge, 0.5 inches for injections  Four 1-ml tuberculin syringes for injections  One sodium chloride 10 ml vial preservative free  Alcohol swabs    SKIN PREPARATION:  Skin preparation was performed using an alcohol wipe.      AREA/MUSCLE INJECTED:  155 units of Botox    Right upper Trapezius (upper cervical) - 5 units of Botox at 3 site/s.   Left upper Trapezius (upper cervical) - 5 units of Botox at 3 site/s.     Right cervical paraspinals - 5 units of Botox at 2 site/s.   Left cervical paraspinals - 5 units of Botox at 2 site/s.     Left occipitalis - 5 units of Botox at 3 site/s.  Right occipitalis -5 units of Botox at 3 site/s      Right Frontalis - 5 units of Botox at 2 site/s.  Left Frontalis - 5 units of Botox at 2 site/s.    Right Temporalis - 5 units of Botox at 4  site/s.  Left Temporalis - 5 units of Botox at 4 site/s.    Right  - 5 units of Botox at 1 site/s.              Left  - 5 units of Botox at 1 site/s.    Procerus - 5 units of Botox at 1 site/s.    RESPONSE TO PROCEDURE:  tolerated the procedure well and there were no immediate complications.  Patient was allowed to recover for an appropriate period of time and was discharged home in stable condition.    FOLLOW UP:    Repeat Botox injections in 12 weeks      This procedure was performed under a hospital privileging agreement with NATALY Bonner, [facility]

## 2022-09-26 NOTE — TELEPHONE ENCOUNTER
Spoke with Dinora and scheduled an appointment on 2-8 with Dr. Genao.     Dinora is asking who is placing the orders for  her GT supplies and this is being ordered through [facility] and possibly coming from  DME.   [facility] can change out her GT in the OR.  This needs to be organized through the [facility] clinic.      Dinora is having pain through her entire abdomen and the bloating causes pain.  Discussed with Dr. Genao and she needs to follow up with the pain clinic.     Patient is experiencing constipation even with the meds that are ordered.    Spoke with Dr. Genao and Dinora can try Miralax 3-4 times per day, a fleets enema and glycerine supp.     Left a voicemail for Dinora with the above information

## 2022-09-30 NOTE — PROGRESS NOTES
This is a recent snapshot of the patient's [facility] medical record.  For current drug dose and complete information and questions, call [phone] or In Basket pool, [facility] ([id number])  CSN Number:  [id number]     [phone]

## 2022-10-01 DIAGNOSIS — E03.9 HYPOTHYROIDISM, UNSPECIFIED TYPE: ICD-10-CM

## 2022-10-05 RX ORDER — LEVOTHYROXINE SODIUM 137 UG/1
TABLET ORAL
Qty: 90 TABLET | Refills: 3 | OUTPATIENT
Start: 2022-10-05

## 2022-10-10 ENCOUNTER — CARE COORDINATION (OUTPATIENT)
Dept: GASTROENTEROLOGY | Facility: CLINIC | Age: 57
End: 2022-10-10

## 2022-10-10 DIAGNOSIS — Z46.59 ENCOUNTER FOR CARE RELATED TO FEEDING TUBE: Primary | ICD-10-CM

## 2022-10-11 ASSESSMENT — ANXIETY QUESTIONNAIRES
2. NOT BEING ABLE TO STOP OR CONTROL WORRYING: SEVERAL DAYS
GAD7 TOTAL SCORE: 5
7. FEELING AFRAID AS IF SOMETHING AWFUL MIGHT HAPPEN: NOT AT ALL
1. FEELING NERVOUS, ANXIOUS, OR ON EDGE: SEVERAL DAYS
3. WORRYING TOO MUCH ABOUT DIFFERENT THINGS: SEVERAL DAYS
7. FEELING AFRAID AS IF SOMETHING AWFUL MIGHT HAPPEN: NOT AT ALL
5. BEING SO RESTLESS THAT IT IS HARD TO SIT STILL: SEVERAL DAYS
8. IF YOU CHECKED OFF ANY PROBLEMS, HOW DIFFICULT HAVE THESE MADE IT FOR YOU TO DO YOUR WORK, TAKE CARE OF THINGS AT HOME, OR GET ALONG WITH OTHER PEOPLE?: SOMEWHAT DIFFICULT
4. TROUBLE RELAXING: SEVERAL DAYS
6. BECOMING EASILY ANNOYED OR IRRITABLE: NOT AT ALL
IF YOU CHECKED OFF ANY PROBLEMS ON THIS QUESTIONNAIRE, HOW DIFFICULT HAVE THESE PROBLEMS MADE IT FOR YOU TO DO YOUR WORK, TAKE CARE OF THINGS AT HOME, OR GET ALONG WITH OTHER PEOPLE: SOMEWHAT DIFFICULT
GAD7 TOTAL SCORE: 5

## 2022-10-11 ASSESSMENT — PAIN SCALES - PAIN ENJOYMENT GENERAL ACTIVITY SCALE (PEG)
INTERFERED_GENERAL_ACTIVITY: 9
AVG_PAIN_PASTWEEK: 5
INTERFERED_ENJOYMENT_LIFE: 8
INTERFERED_GENERAL_ACTIVITY: 9
AVG_PAIN_PASTWEEK: 5
PEG_TOTALSCORE: 7.33
PEG_TOTALSCORE: 7.33
INTERFERED_ENJOYMENT_LIFE: 8

## 2022-10-11 ASSESSMENT — ENCOUNTER SYMPTOMS
HEARTBURN: 1
TINGLING: 0
BLOOD IN STOOL: 0
DIZZINESS: 0
NAUSEA: 1
EYE REDNESS: 0
DISTURBANCES IN COORDINATION: 0
INSOMNIA: 1
DECREASED CONCENTRATION: 1
SPEECH CHANGE: 0
PARALYSIS: 0
DIARRHEA: 1
BOWEL INCONTINENCE: 0
EYE IRRITATION: 1
WEAKNESS: 0
DOUBLE VISION: 0
MEMORY LOSS: 0
SEIZURES: 0
BLOATING: 1
BREAST MASS: 0
HEADACHES: 1
RECTAL PAIN: 0
VOMITING: 1
EYE WATERING: 0
BREAST PAIN: 1
CONSTIPATION: 1
TREMORS: 0
DEPRESSION: 0
ABDOMINAL PAIN: 1
EYE PAIN: 0
NERVOUS/ANXIOUS: 1
NUMBNESS: 0
JAUNDICE: 0
PANIC: 1
LOSS OF CONSCIOUSNESS: 0

## 2022-10-13 ENCOUNTER — OFFICE VISIT (OUTPATIENT)
Dept: ANESTHESIOLOGY | Facility: CLINIC | Age: 57
End: 2022-10-13
Payer: COMMERCIAL

## 2022-10-13 VITALS — DIASTOLIC BLOOD PRESSURE: 82 MMHG | HEART RATE: 94 BPM | SYSTOLIC BLOOD PRESSURE: 122 MMHG | OXYGEN SATURATION: 97 %

## 2022-10-13 DIAGNOSIS — M79.18 MYOFASCIAL PAIN: Primary | ICD-10-CM

## 2022-10-13 PROCEDURE — 99214 OFFICE O/P EST MOD 30 MIN: CPT | Performed by: ANESTHESIOLOGY

## 2022-10-13 RX ORDER — METHOCARBAMOL 750 MG/1
750 TABLET, FILM COATED ORAL 4 TIMES DAILY
Qty: 120 TABLET | Refills: 0 | Status: SHIPPED | OUTPATIENT
Start: 2022-10-13 | End: 2022-11-12

## 2022-10-13 ASSESSMENT — ENCOUNTER SYMPTOMS
INSOMNIA: 1
EYE REDNESS: 0
SEIZURES: 0
EYE PAIN: 0
TREMORS: 0
VOMITING: 1
CONSTIPATION: 1
NERVOUS/ANXIOUS: 1
HEADACHES: 1
MEMORY LOSS: 0
DOUBLE VISION: 0
BLOOD IN STOOL: 0
TINGLING: 0
HEARTBURN: 1
SPEECH CHANGE: 0
LOSS OF CONSCIOUSNESS: 0
ABDOMINAL PAIN: 1
DIZZINESS: 0
DEPRESSION: 0
DIARRHEA: 1
WEAKNESS: 0
NAUSEA: 1

## 2022-10-13 ASSESSMENT — PAIN SCALES - GENERAL: PAINLEVEL: MILD PAIN (3)

## 2022-10-13 NOTE — NURSING NOTE
RN read through the instructions with the patient for the recommended procedure: Case Request: Trigger point injections right abdomen with ultrasound guidance    Patient verbalized understanding to holding appropriate medication per protocol and was agreeable to NPO policy and needing a .    Anticoagulant: None reported    Recommended Follow Up:  As needed following trigger point injections.    Elsie Leone RN

## 2022-10-13 NOTE — PATIENT INSTRUCTIONS
Medications:    methocarbamol (ROBAXIN) 750 MG tablet - Take 1 tablet (750 mg) by mouth 4 times daily for 30 days    For refills of opioid medications - You MUST call (Or MyChart) the clinic DIRECTLY and at least 7 days before you run out of your medication.    Please provide the clinic with a minium of 1 week notice, on all prescription refills.         Procedures:    Call to schedule your procedure: [phone] option #2  Case Request: Trigger point injections right abdomen with ultrasound guidance    Your pre-procedure instructions are below, please call our clinic if you have any questions.        Recommended Follow up:      Follow up as needed.       To speak with a nurse, schedule/reschedule/cancel a clinic appointment, or request a medication refill call: [phone].    You can also reach us by [employer]: [email or web address]    Procedure Information related to COVID-19     Please call [phone] to schedule, reschedule, or cancel your procedure appointment.   Phones are answered Monday - Friday from 08:00 - 4:30pm.  Leave a voicemail with your name, birth date, and phone number if no one is available to take your call.         You will need to be tested for COVID-19 before your procedure. If you're going home on the same day as your procedure (surgery), you may take an at-home rapid antigen test 1 to 2 days before your procedure. If your test is negative, please take a photo of the test. Show the photo to the nurse when you come in for your procedure. If your test is positive, please update our office right away and your procedure may have to be postponed.     If you do not have access to an at-home rapid test, you will have to be tested at a lab within 4 days of your procedure.  You will be called to schedule your COVID test by a central scheduling team. If you have not been contacted to schedule your test within 4 days of the scheduled procedure, call [phone]     Please be aware  that the turn around time for the test is approximately 24-72 hours.   If your results are still pending the day of your procedure, you will be notified as soon as possible as the procedure may be cancelled.     Please note: You will only be contacted for positive and pending results.      The procedure center staff will call you several days before the procedure to review important information that you will need to know for the day of the procedure.   Please contact the clinic if you have further questions about this information.         Information related to Scheduling and Pre-Procedure Instructions:    If you must reschedule your procedure more than two times, you must follow up in clinic before rescheduling again.      Preparing for your procedure    CAUTION - FAILURE TO FOLLOW THESE PRE-PROCEDURE INSTRUCTIONS WILL RESULT IN YOUR PROCEDURE BEING RESCHEDULED.    Your Procedure: Case Request: Trigger point injections right abdomen with ultrasound guidance      Pregnancy  If you are pregnant, or think you may be pregnant, please notify our staff. This may or may not affect the ability to perform the procedure.       You must have a  take you home after your procedure. Transportation by taxi or para-transit is okay as long as you have a responsible adult accompany you. You must provide your 's full name and contact number at time of check in.     Fasting Protocol Please have nothing to eat and drink for 2 hours before the procedure.      Medications If you take any medications, DO NOT STOP. Take your medications as usual the day of your procedure with a sip of water AT LEAST 2 HOURS PRIOR TO ARRIVAL.    Antibiotics If you are currently taking antibiotics, you must complete the entire dose 7 days prior to your scheduled procedure. You must be clear of any signs or symptoms of infection. If you begin antibiotics, please contact our clinic for instructions.     Fever, Chills, or Rash If you  experience a fever of higher than 100 degrees, chills, rash, or open wounds during the one week before your procedure, please call the clinic to see if you may proceed with your procedure.      Medication Hold List  **Patients under Cardiology/Neurology care should consult their provider prior to the pain procedure to verify pre-procedure medication instructions. The information below contains general guidelines.**        Blood Thinners If you are taking daily ASPIRIN, PLAVIX, OR OTHER BLOOD THINNERS SUCH AS COUMADIN/WARFARIN, we will need your prescribing doctor to sign a release permitting you to stop these medications. Once approved by your prescribing doctor - STOP ALL BLOOD THINNERS BASED ON THE TIME TABLE BELOW PRIOR TO YOUR PROCEDURE. If you have been instructed to stop WARFARIN(COUMADIN), you must have an INR lab drawn the day before your procedure. . Your INR must be within normal limits before we can perform your injection. MEDICATIONS CAN BE RESTARTED AFTER YOUR PROCEDURE.    14 DAY HOLD  Ticlid (ticlopidine)    10 DAY HOLD  Effient (Prasugel)    3 DAY HOLD  Xarelto (rivaroxaban) 7 DAY HOLD  Anacin, Bufferin, Ecotrin, Excedrin, Aggrenox (Aspirin)  Brilinta (ticagrelor)  Coumadin (Warfarin)  Pradexa (Dabigatran)  Elmiron (Pentosan)  Plavix (Clopidogrel Bisulfate)  Pletal (Cilostazol)    24 HOUR HOLD  Lovenox (enoxaparin)  Agrylin (Anagrelide)        Non-steroidal Anti-inflammatories (NSAIDs) DO NOT TAKE any non-steroidal anti-inflammatory medications (NSAIDs) listed on the table below. MEDICATIONS CAN BE RESTARTED AFTER YOUR PROCEDURE. Celebrex is OK to take and does not need to be discontinued.     Medications to stop:  3 DAY HOLD  Advil, Motrin (Ibuprofen)  Arthrotec (diciofenac sodium/misoprostol)  Clinoril (Sulindac)  Indocin (Indomethacin)  Lodine (Etodolac)  Toradol (Ketorolac)  Vicoprofen (Hydrocodone and Ibuprofen)  Voltaren (Diclotenac)    14 DAY HOLD  Daypro (Oxaprozin)  Feldene (Piroxicam)   7  DAY HOLD  Aleve (Naproxen sodium)  Darvon compound (contains aspirin)  Naprosyn (Naproxen)  Norgesic Forte (contains aspirin)  Mobic (Meloxicam)  Oruvall (Ketoprofen)  Percodan (contains aspirin)  Relafen (Nabumetone)  Salsalate  Trilisate  Vitamin E (more than 400 mg per day)  Any medication containing aspirin                To speak with a nurse, schedule/reschedule/cancel a clinic appointment, or request a medication refill call: [phone]    You can also reach us by [employer]: [email or web address]

## 2022-10-13 NOTE — PROGRESS NOTES
Pain clinic follow up note    HPI:   Dinora Mcghee is a 57 year old year old female  who presents to the pain clinic with abdominal pain, s/p TPI    Patient Supplied Answers To the [facility] Pain Questionnaire  [facility] -  Patient Entered Questionnaire/Answers 10/11/2022   What number best describes your pain right now:  0 = No pain  to  10 = Worst pain imaginable 4   How would you describe the pain? sharp, pressure   Which of the following worsen your pain? lying down, exercise, coughing / sneezing   Which of the following improve or reduce your pain? sitting, medication, bowel movements   What number best describes your average pain for the past week:  0 = No pain  to  10 = Worst pain imaginable 9   What number best describes your LOWEST pain in past 24 hours:  0 = No pain  to  10 = Worst pain imaginable 2   What number best describes your WORST pain in past 24 hours:  0 = No pain  to  10 = Worst pain imaginable 7   When is your pain worst? AM, Night   What non-medicine treatments have you already had for your pain? pain clinic, physical therapy, relaxation training, acupuncture, other nerve blocks, counseling, surgery, exercise   Have you tried treating your pain with medication?  -   Are you currently taking medications for your pain? -             Dinora presents to the clinic with her . The patient reports 100% resolution of pain on the left side abdomen. The aptient reports ongoing pain in the right abdomen and allodynia. She states that perstalsis worsen pain and there is a tugging sensation on the tube. The patient states her bowels feel stuck if there is no peristalsis and she feel nauseous. The patient is enrolled in a study for domperidone.       ROS:  Review of Systems   Eyes: Negative for double vision, pain and redness.   Gastrointestinal: Positive for abdominal pain, constipation, diarrhea, heartburn, nausea and vomiting. Negative for blood in stool and melena.   Neurological: Positive for  headaches. Negative for dizziness, tingling, tremors, speech change, seizures, loss of consciousness and weakness.   Psychiatric/Behavioral: Negative for depression and memory loss. The patient is nervous/anxious and has insomnia.    All other systems reviewed and are negative.  Answers for HPI/ROS submitted by the patient on 10/11/2022  DARCY 7 TOTAL SCORE: 5  General Symptoms: No  Skin Symptoms: No  HENT Symptoms: No  EYE SYMPTOMS: Yes  HEART SYMPTOMS: No  LUNG SYMPTOMS: No  INTESTINAL SYMPTOMS: Yes  URINARY SYMPTOMS: No  GYNECOLOGIC SYMPTOMS: No  BREAST SYMPTOMS: Yes  SKELETAL SYMPTOMS: No  BLOOD SYMPTOMS: No  NERVOUS SYSTEM SYMPTOMS: Yes  MENTAL HEALTH SYMPTOMS: Yes  Vision loss: No  Dry eyes: Yes  Watery eyes: No  Eye bulging: No  Flashing of lights: Yes  Spots: No  Floaters: No  Crossed eyes: No  Tunnel Vision: No  Yellowing of eyes: No  Eye irritation: Yes  Bloating: Yes  Rectal or Anal pain: No  Fecal incontinence: No  Yellowing of skin or eyes: No  Vomit with blood: No  Change in stools: No  Discharge: No  Lumps: No  Pain: Yes  Nipple retraction: No  Trouble with coordination: No  Difficulty walking: No  Paralysis: No  Numbness: No  Trouble thinking or concentrating: Yes  Mood changes: No  Panic attacks: Yes        Significant Medical History:   Past Medical History:   Diagnosis Date     Allergic rhinitis, cause unspecified      Allergy to other foods     strawberries, apples, celeries, alice, watermelon     Arthritis     left knee     Choledocholithiasis     long after cholecystectomy     Chronic abdominal pain      Chronic constipation      Chronic nausea      Chronic pancreatitis (H)      Degeneration of lumbar or lumbosacral intervertebral disc      Esophageal reflux     w/ hiatal hernia     Gastroparesis      Hiatal hernia      History of pituitary adenoma     s/p resection     Hypothyroidism      Migraines      Mild hyperlipidemia      On tube feeding diet     presence of GJ tube     Pancreatic disease      PD stricture, 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 endometriosis growth     APPENDECTOMY        SECTION       COLONOSCOPY       ENDOSCOPIC INSERTION TUBE GASTROSTOMY N/A 2021    Procedure: Gastrojejonostomy placement with jejunopexy, PEG tube exchange;  Surgeon: Zackery Montoya MD;  [facility]     ENDOSCOPIC RETROGRADE CHOLANGIOPANCREATOGRAM N/A 2015    Procedure: ENDOSCOPIC RETROGRADE CHOLANGIOPANCREATOGRAM;  Surgeon: Mandeep Park MD;  [facility]     ENDOSCOPIC RETROGRADE CHOLANGIOPANCREATOGRAM N/A 2016    Procedure: COMBINED ENDOSCOPIC RETROGRADE CHOLANGIOPANCREATOGRAPHY, PLACE TUBE/STENT;  Surgeon: Mandeep Park MD;  [facility]     ENDOSCOPIC RETROGRADE CHOLANGIOPANCREATOGRAM N/A 3/17/2016    Procedure: COMBINED ENDOSCOPIC RETROGRADE CHOLANGIOPANCREATOGRAPHY, REMOVE FOREIGN BODY OR STENT/TUBE;  Surgeon: Mandeep Park MD;  [facility]     ENDOSCOPIC RETROGRADE CHOLANGIOPANCREATOGRAM N/A 2016    Procedure: COMBINED ENDOSCOPIC RETROGRADE CHOLANGIOPANCREATOGRAPHY, PLACE TUBE/STENT;  Surgeon: Mandeep Park MD;  [facility]     ENDOSCOPIC RETROGRADE CHOLANGIOPANCREATOGRAM N/A 2016    Procedure: COMBINED ENDOSCOPIC RETROGRADE CHOLANGIOPANCREATOGRAPHY, REMOVE FOREIGN BODY OR STENT/TUBE;  Surgeon: Mandeep Park MD;  [facility]     ENDOSCOPIC ULTRASOUND UPPER GASTROINTESTINAL TRACT (GI) N/A 10/3/2016    Procedure: ENDOSCOPIC ULTRASOUND, ESOPHAGOSCOPY / UPPER GASTROINTESTINAL TRACT (GI);  Surgeon: Guru Jose Rodas MD;  [facility]      ENTEROSCOPY SMALL BOWEL N/A 2/3/2022    Procedure: ENTEROSCOPY with possible fistula closure;  Surgeon: Francisco Dodson MD;  Location:  GI     ESOPHAGOSCOPY, GASTROSCOPY, DUODENOSCOPY (EGD), COMBINED N/A 6/24/2015    Procedure: COMBINED ESOPHAGOSCOPY, GASTROSCOPY, DUODENOSCOPY (EGD), REMOVE FOREIGN BODY;  Surgeon: Mandeep Park MD;  [facility]     ESOPHAGOSCOPY, GASTROSCOPY, DUODENOSCOPY (EGD), COMBINED N/A 10/25/2015    Procedure: COMBINED ESOPHAGOSCOPY, GASTROSCOPY, DUODENOSCOPY (EGD);  Surgeon: Sammy Amaro MD;  [facility]     ESOPHAGOSCOPY, GASTROSCOPY, DUODENOSCOPY (EGD), COMBINED N/A 10/25/2015    Procedure: COMBINED ESOPHAGOSCOPY, GASTROSCOPY, DUODENOSCOPY (EGD), BIOPSY SINGLE OR MULTIPLE;  Surgeon: Sammy Amaro MD;  [facility]     ESOPHAGOSCOPY, GASTROSCOPY, DUODENOSCOPY (EGD), DILATATION, COMBINED       EXCISE LESION TRUNK N/A 4/17/2017    Procedure: EXCISE LESION TRUNK;  Removal of Abdominal Foreign Body;  Surgeon: Nestor Phoenix MD;  [facility]     HC ESOPH/GAS REFLUX TEST W NASAL IMPED >1 HR N/A 11/19/2015    Procedure: ESOPHAGEAL IMPEDENCE FUNCTION TEST WITH 24 HOUR PH GREATER THAN 1 HOUR;  Surgeon: Thiago Apple MD;  [facility]     HC UGI ENDOSCOPY DIAG W BIOPSY  9/17/08     HC UGI ENDOSCOPY DIAG W BIOPSY  9/27/12     HC UGI ENDOSCOPY W ESOPHAGEAL DILATION BALLOON <30MM  9/17/08     HC UGI ENDOSCOPY W EUS N/A 5/5/2015    Procedure: COMBINED ENDOSCOPIC ULTRASOUND, ESOPHAGOSCOPY, GASTROSCOPY, DUODENOSCOPY (EGD);  Surgeon: Wm Dueñas MD;  [facility]     HC WRIST ARTHROSCOP,RELEASE XVERS LIG Bilateral 12/17/08     INJECT TRANSVERSUS ABDOMINIS PLANE (TAP) BLOCK BILATERAL Left 9/22/2016    Procedure: INJECT TRANSVERSUS ABDOMINIS PLANE (TAP) BLOCK BILATERAL;  Surgeon: Dickson Corrigan MD;  [facility]     INJECT TRIGGER POINT Bilateral 9/8/2022    Procedure: Abdominal trigger point injection with ultrasound;  Surgeon: Keyshawn  MD Monika;  [facility]     IR CHEST PORT PLACEMENT < 5 YRS OF AGE  6/10/2022     laparoscopic pineda       LAPAROSCOPIC HERNIORRHAPHY INCISIONAL N/A 2018    Procedure: LAPAROSCOPIC HERNIORRHAPHY INCISIONAL;  Laparoscopic Incisional Hernia Repair with Symbotex Mesh Implant;  Surgeon: Nestor Phoenix MD;  [facility]     LAPAROSCOPIC PANCREATECTOMY, TRANSPLANT AUTO ISLET CELL N/A 2016    Procedure: LAPAROSCOPIC PANCREATECTOMY, TRANSPLANT AUTO ISLET CELL;  Surgeon: Nestor Phoenix MD;  [facility]     REMOVE GASTROSTOMY TUBE ADULT N/A 2022    Procedure: REMOVAL, GASTROSTOMY TUBE, ADULT;  Surgeon: Mandeep Park MD;  [facility]     REPLACE GASTROJEJUNOSTOMY TUBE, PERCUTANEOUS N/A 2021    Procedure: GASTROJEJUNOSTOMY TUBE PLACEMENT, PERCUTANEOUS, WITH GASTROPEXY;  Surgeon: Mandeep Park MD;  [facility]     REPLACE GASTROJEJUNOSTOMY TUBE, PERCUTANEOUS N/A 2021    Procedure: REPLACEMENT, GASTROJEJUNOSTOMY TUBE, PERCUTANEOUS;  Surgeon: Zackery Montoya MD;  [facility]     REPLACE JEJUNOSTOMY TUBE, PERCUTANEOUS N/A 9/10/2021    Procedure: UPPER ENDOSCOPY, REPLACEMENT OF PERCUTANEOUS GASTROJEJUNOSTOMY TUBE, T-TAG GASTROPEXY;  Surgeon: Zackery Montoya MD;  [facility]     REPLACE JEJUNOSTOMY TUBE, PERCUTANEOUS N/A 2021    Procedure: REPLACEMENT, JEJUNOSTOMY TUBE, PERCUTANEOUS;  Surgeon: Mandeep Park MD;  [facility]     transphenoidal pituitary resection       ZC  DELIVERY ONLY       ZZC  DELIVERY ONLY      repeat c section with incidental cystotomy with repair     ZZC EXCIS PITUITARY,TRANSNASAL/SEPTAL      pituitary tumor removed for diabetes insipidus     Z TOTAL ABDOM HYSTERECTOMY      w/ bilateral salpingoophorectomy           Family History:  Family History   Problem Relation Age of Onset     Lipids Mother      Hypertension Mother      Thyroid Disease Mother       Depression Mother      Angina Mother      GERD Mother      Skin Cancer Mother      Migraines Mother      Eye Disorder Father         cataract, detached retina     Myocardial Infarction Father 60     Lipids Father      Cerebrovascular Disease Father      Depression Father      Substance Abuse Father      Anesthesia Reaction Father         stroke right after surgery     Cataracts Father      Osteoarthritis Father      Ulcerative Colitis Father      Interpersonal Violence Sister      Coronary Artery Disease Sister         angioplasty     GERD Sister      Substance Abuse Sister      Depression Sister      Thyroid Disease Sister      Eye Disorder Maternal Grandmother         cataract     Thyroid Disease Maternal Grandmother      Diabetes Maternal Grandfather      Eye Disorder Paternal Grandmother         cataract     Eye Disorder Paternal Grandfather         cataract     Diabetes Paternal Grandfather      Eye Disorder Son         ptosis     Depression Son      Anxiety Disorder Son      Heart Disease Paternal Aunt      Diabetes Paternal Aunt      Diabetes Paternal Uncle      Heart Disease Paternal Uncle      Depression Nephew      Anxiety Disorder Nephew      Thyroid Disease Nephew      Diabetes Type 2  Cousin         paternal cousin          Social History:  Social History     Socioeconomic History     Marital status:      Spouse name: Norris     Number of children: 3     Years of education: 16     Highest education level: Not on file   Occupational History     Occupation: director     Employer: [employer]   Tobacco Use     Smoking status: Former     Packs/day: 0.50     Years: 6.00     Pack years: 3.00     Types: Cigarettes     Start date: 1985     Quit date: 1992     Years since quittin.8     Smokeless tobacco: Never     Tobacco comments:     no 2nd hand   Substance and Sexual Activity     Alcohol use: Not Currently     Alcohol/week: 3.0 - 6.0 standard drinks     Types: 1 - 2 Glasses of wine, 1 - 2 Cans  of beer, 1 - 2 Shots of liquor per week     Comment: none since IGG     Drug use: No     Sexual activity: Yes     Partners: Male     Birth control/protection: None     Comment: Hystectomy 1999   Other Topics Concern     Parent/sibling w/ CABG, MI or angioplasty before 65F 55M? No      Service Not Asked     Blood Transfusions No     Caffeine Concern Not Asked     Occupational Exposure Not Asked     Hobby Hazards Not Asked     Sleep Concern Not Asked     Stress Concern Not Asked     Weight Concern Not Asked     Special Diet Not Asked     Back Care Not Asked     Exercise Not Asked     Bike Helmet Not Asked     Seat Belt Yes     Self-Exams Not Asked   Social History Narrative     with 3 children and a dog.  No smoking, etoh or drug use.  Worked as a  for [employer] in [place] in the past.  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     Social Determinants of Health     Financial Resource Strain: Not on file   Food Insecurity: Not on file   Transportation Needs: Not on file   Physical Activity: Not on file   Stress: Not on file   Social Connections: Not on file   Intimate Partner Violence: Not on file   Housing Stability: Not on file     Social History     Social History Narrative     with 3 children and a dog.  No smoking, etoh or drug use.  Worked as a  for [employer] in the past.  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          Allergies:  Allergies   Allergen Reactions     Apple Anaphylaxis     Corticosteroids Other (See Comments)     All oral, IV and injectable steroids are contraindicated (unless in life threatening situations) in [facility] transplant recipients. They can cause irreversible loss of islet cell function. Please contact patient's transplant care coordinator ADI Gaffney RN at [phone]/pager [phone] and/or endocrinologist prior to administration.        Depakote [Divalproex Sodium] Other (See Comments)     Chest pain     Zithromax [Azithromycin Dihydrate] Anaphylaxis     Noted in 4/7/08 ER     Bromfenac      Other reaction(s): Headache     Codeine      Other reaction(s): Hallucinations     Darvocet [Propoxyphene N-Apap] Itching     Hydromorphone Other (See Comments)     Loopy     Morphine Nausea and Vomiting and Rash     Nalbuphine Hcl Rash     RASH :nubaine     Zosyn [Piperacillin-Tazobactam In D5w] Rash     Possible allergy, did have a diffuse rash that seemed drug related but could have also been related to soap in the hospital.      Bactrim [Sulfamethoxazole W-Trimethoprim] Other (See Comments) and Nausea and Vomiting     Severely low liver function.     Compazine [Prochlorperazine] Other (See Comments)     Twitching. Takes Benedryl and is fine     Tape [Adhesive Tape] Blisters     Tramadol      Zofran [Ondansetron] Other (See Comments)     migraine     Flagyl [Metronidazole] Hives and Rash       Current Medications:   Current Outpatient Medications   Medication Sig Dispense Refill     acarbose (PRECOSE) 50 MG tablet Take 1 tablet (50 mg) by mouth 3 times daily (with meals) Increase to 100 mg three times daily as needed. 180 tablet 5     amylase-lipase-protease (CREON) 83498-29511 units CPEP per EC capsule Take 3-4 capsules by mouth 3 times daily (with meals) With oral meals 150 capsule 11     azelastine (ASTELIN) 0.1 % nasal spray Spray 1 spray into both nostrils 2 times daily 1 Bottle 11     blood glucose (PAT CONTOUR NEXT) test strip Use to test blood sugar 6 times daily or as directed. 2 Box 11     blood glucose monitoring (PAT MICROLET) lancets Use to test blood sugar 6-8 times daily or as directed. 100 each 11     cetirizine (ZYRTEC) 10 MG tablet Take 1 tablet (10 mg) by mouth daily 90 tablet 3     Continuous Blood Gluc Sensor (FREESTYLE LISA 2 SENSOR) [facility] Inject 1 patch into the skin every 14 days 2 each 11     cyclobenzaprine (FLEXERIL) 10 MG  "tablet Take 1 tablet (10 mg) by mouth 2 times daily as needed for muscle spasms 90 tablet 3     diphenhydrAMINE (BENADRYL ALLERGY) 25 MG capsule Take 1 capsule (25 mg) by mouth every 6 hours as needed for itching or allergies 56 capsule 0     estradiol (VAGIFEM) 10 MCG TABS vaginal tablet INSERT 1 TABLET(10 MCG) VAGINALLY 2 TIMES A WEEK 24 tablet 2     famotidine (PEPCID) 20 MG tablet Take 1 tablet (20 mg) by mouth daily 90 tablet 3     glucagon 1 MG kit Give 0.1 to 0.15mg( 10-15 units on Insulin sryinge) subcutaneous  every 15 minutes PRN for hypoglycemia. Remix new kit q24hr. Needs up to 3 kit/week. 10 each 3     glucose 40 % GEL gel Take 15-30 g by mouth every 15 minutes as needed for low blood sugar 3 Tube 2     Hydroactive Dressings (TEGADERM HYDROCOLLOID THIN) MISC Use under sensor site , change every 14 days 2 each 11     HYDROmorphone, STANDARD CONC, (DILAUDID) 1 MG/ML oral solution Take 1-2 mLs (1-2 mg) by mouth every 4 hours as needed for moderate to severe pain (Post G and J tube placement) 168 mL 0     ibuprofen (ADVIL/MOTRIN) 400 MG tablet Take 1 tablet (400 mg) by mouth every 6 hours as needed for moderate pain 30 tablet 0     insulin syringe-needle U-100 (30G X 1/2\" 0.3 ML) 30G X 1/2\" 0.3 ML miscellaneous Use 3 syringes daily or as directed. 50 each 1     levothyroxine (SYNTHROID/LEVOTHROID) 137 MCG tablet Take 1 tablet (137 mcg) by mouth daily 90 tablet 3     Lidocaine (LIDOCARE) 4 % Patch Place 1 patch onto the skin every 24 hours To prevent lidocaine toxicity, patient should be patch free for 12 hrs daily. 10 patch 0     methocarbamol (ROBAXIN) 750 MG tablet Take 750 mg by mouth 4 times daily       montelukast (SINGULAIR) 10 MG tablet TAKE 1 TABLET(10 MG) BY MOUTH AT BEDTIME 90 tablet 3     Multiple Vitamin (MULTIVITAMIN ADULT PO) Take 1 tablet by mouth daily       Nutritional Supplements (BOOST HIGH PROTEIN) LIQD After above baseline labs are drawn, give: 6 mL/kg to maximum of 360 mL; the beverage " is to be consumed within 5 minutes.  0     omeprazole (PRILOSEC) 40 MG DR capsule Take 1 capsule (40 mg) by mouth daily 90 capsule 3     order for DME Equipment being ordered:Cefaly 1 Device 0     prochlorperazine (COMPAZINE) 10 MG tablet TAKE 1/2 TABLET(5 MG) BY MOUTH EVERY 6 HOURS AS NEEDED FOR NAUSEA OR VOMITING 60 tablet 2     promethazine (PHENERGAN) 25 MG tablet Take 25 mg by mouth       promethazine (PHENERGAN) 25 MG tablet Take 1 tablet (25 mg) by mouth daily as needed (take as needed for nausea/vomiting) 60 tablet 0     Prucalopride Succinate 2 MG TABS Take 1 tablet (2 mg) by mouth daily 30 tablet 11     scopolamine (TRANSDERM) 1 MG/3DAYS 72 hr patch Place 1 patch onto the skin every 72 hours 10 patch 11     scopolamine (TRANSDERM) 1 MG/3DAYS 72 hr patch Place 1 patch onto the skin every 72 hours 10 patch 11     Sharps Container (BD SHARPS ) MISC 1 Container as needed 1 each 1     study - domperidone, IDS# [id number], 10 MG tablet Take 2 tablets (20 mg) by mouth 4 times daily 240 tablet 1     ZOLMitriptan (ZOMIG) 5 MG tablet TAKE 1 TABLET BY MOUTH DAILY AS NEEDED FOR MIGRAINE. ONLY TAKE 2 TABLETS IN A 24 HR PERIOD. MAY USE 1 TO 2 TIMES PER WEEK       ZOLMitriptan (ZOMIG-ZMT) 5 MG ODT Take 1 tablet (5 mg) by mouth at onset of headache for migraine May repeat in 2 hours. Max 2 tablets/24 hours. 18 tablet 6     study - domperidone, IDS# [id number], 10 MG tablet Take 2 tablets (20 mg) by mouth 4 times daily 240 tablet 1             Physical Exam:     /82 (BP Location: Right arm, Patient Position: Chair, Cuff Size: Adult Regular)   Pulse 94   SpO2 97%     General Appearance: No distress, seated comfortably  Mood: Euthymic  HE ENT: Non constricted pupils  Respiratory: Non labored breathing  GI: Soft, non distended, TTP right abdomen  Skin: No rashes over exposed skin  MS: Normal muscle bulk  Neuro: Cranial nerves 2-12 intact  Gait: non antalgic, ambulates with out assistance    Pain specific  exam:    Carnett's positive.     ASSESSMENT AND PLAN:     Encounter Diagnosis:  Myofasial Pain  Abdominal pain  SOURAVS       Dinora Mcghee is a 57 year old year old female  who presents to the pain clinic with severe right abdominal pain, s/p TPI with complete resolution of left abdomen pain.     RECOMMENDATIONS:     1. Medications: We are prescribing the patient renewal of methocarbimol. Dosing, side effects, risks/benefits/alternatives were discussed with the patient in detail.    2. Procedure: We are scheduling the patient for Trigger point injections right abdomen with ultrasound guidance in the procedure suite. Risks/benefits/alternatives were discussed.   We discussed the option of botox injectins.   Follow up: 3-4 weeks after TPI

## 2022-10-13 NOTE — NURSING NOTE
Patient presents with:  RECHECK: Follow-up: continued treatment for abdominal pain      Mild Pain (3)     Pain Medications     Opioid Agonists Refills Start End     HYDROmorphone, STANDARD CONC, (DILAUDID) 1 MG/ML oral solution    0 11/28/2021     Sig - Route: Take 1-2 mLs (1-2 mg) by mouth every 4 hours as needed for moderate to severe pain (Post G and J tube placement) - Oral    Class: Local Print    Earliest Fill Date: 11/28/2021          What medications are you using for pain? Lidocaine patch, Tylenol, flexeril, robaxin      (Return Patients only) What refills are you needing today? Robaxin    Vern Amato

## 2022-10-13 NOTE — LETTER
10/13/2022       RE: Dinora Mcghee  [address] [facility] [address]     Dear Colleague,    Thank you for referring your patient, Dinora Mcghee, to the [facility] [place] at [facility]. Please see a copy of my visit note below.    Pain clinic follow up note    HPI:   Dinora Mcghee is a 57 year old year old female  who presents to the pain clinic with abdominal pain, s/p TPI    Patient Supplied Answers To the [facility] Pain Questionnaire  [facility] Pain -  Patient Entered Questionnaire/Answers 10/11/2022   What number best describes your pain right now:  0 = No pain  to  10 = Worst pain imaginable 4   How would you describe the pain? sharp, pressure   Which of the following worsen your pain? lying down, exercise, coughing / sneezing   Which of the following improve or reduce your pain? sitting, medication, bowel movements   What number best describes your average pain for the past week:  0 = No pain  to  10 = Worst pain imaginable 9   What number best describes your LOWEST pain in past 24 hours:  0 = No pain  to  10 = Worst pain imaginable 2   What number best describes your WORST pain in past 24 hours:  0 = No pain  to  10 = Worst pain imaginable 7   When is your pain worst? AM, Night   What non-medicine treatments have you already had for your pain? pain clinic, physical therapy, relaxation training, acupuncture, other nerve blocks, counseling, surgery, exercise   Have you tried treating your pain with medication?  -   Are you currently taking medications for your pain? -             Dinora presents to the clinic with her . The patient reports 100% resolution of pain on the left side abdomen. The aptient reports ongoing pain in the right abdomen and allodynia. She states that perstalsis worsen pain and there is a tugging sensation on the tube. The patient states her bowels feel stuck if there is no peristalsis and  she feel nauseous. The patient is enrolled in a study for domperidone.       ROS:  Review of Systems   Eyes: Negative for double vision, pain and redness.   Gastrointestinal: Positive for abdominal pain, constipation, diarrhea, heartburn, nausea and vomiting. Negative for blood in stool and melena.   Neurological: Positive for headaches. Negative for dizziness, tingling, tremors, speech change, seizures, loss of consciousness and weakness.   Psychiatric/Behavioral: Negative for depression and memory loss. The patient is nervous/anxious and has insomnia.    All other systems reviewed and are negative.  Answers for HPI/ROS submitted by the patient on 10/11/2022  DARCY 7 TOTAL SCORE: 5  General Symptoms: No  Skin Symptoms: No  HENT Symptoms: No  EYE SYMPTOMS: Yes  HEART SYMPTOMS: No  LUNG SYMPTOMS: No  INTESTINAL SYMPTOMS: Yes  URINARY SYMPTOMS: No  GYNECOLOGIC SYMPTOMS: No  BREAST SYMPTOMS: Yes  SKELETAL SYMPTOMS: No  BLOOD SYMPTOMS: No  NERVOUS SYSTEM SYMPTOMS: Yes  MENTAL HEALTH SYMPTOMS: Yes  Vision loss: No  Dry eyes: Yes  Watery eyes: No  Eye bulging: No  Flashing of lights: Yes  Spots: No  Floaters: No  Crossed eyes: No  Tunnel Vision: No  Yellowing of eyes: No  Eye irritation: Yes  Bloating: Yes  Rectal or Anal pain: No  Fecal incontinence: No  Yellowing of skin or eyes: No  Vomit with blood: No  Change in stools: No  Discharge: No  Lumps: No  Pain: Yes  Nipple retraction: No  Trouble with coordination: No  Difficulty walking: No  Paralysis: No  Numbness: No  Trouble thinking or concentrating: Yes  Mood changes: No  Panic attacks: Yes        Significant Medical History:   Past Medical History:   Diagnosis Date     Allergic rhinitis, cause unspecified      Allergy to other foods     strawberries, apples, celeries, alice, watermelon     Arthritis     left knee     Choledocholithiasis     long after cholecystectomy     Chronic abdominal pain      Chronic constipation      Chronic nausea      Chronic pancreatitis (H)       Degeneration of lumbar or lumbosacral intervertebral disc      Esophageal reflux     w/ hiatal hernia     Gastroparesis      Hiatal hernia      History of pituitary adenoma     s/p resection     Hypothyroidism      Migraines      Mild hyperlipidemia      On tube feeding diet     presence of GJ tube     Pancreatic disease     PD stricture, 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 endometriosis growth     APPENDECTOMY        SECTION       COLONOSCOPY       ENDOSCOPIC INSERTION TUBE GASTROSTOMY N/A 2021    Procedure: Gastrojejonostomy placement with jejunopexy, PEG tube exchange;  Surgeon: Zackery Montoya MD;  [facility]     ENDOSCOPIC RETROGRADE CHOLANGIOPANCREATOGRAM N/A 2015    Procedure: ENDOSCOPIC RETROGRADE CHOLANGIOPANCREATOGRAM;  Surgeon: Mandeep Park MD;  [facility]     ENDOSCOPIC RETROGRADE CHOLANGIOPANCREATOGRAM N/A 2016    Procedure: COMBINED ENDOSCOPIC RETROGRADE CHOLANGIOPANCREATOGRAPHY, PLACE TUBE/STENT;  Surgeon: Mandeep Park MD;  [facility]     ENDOSCOPIC RETROGRADE CHOLANGIOPANCREATOGRAM N/A 3/17/2016    Procedure: COMBINED ENDOSCOPIC RETROGRADE CHOLANGIOPANCREATOGRAPHY, REMOVE FOREIGN BODY OR STENT/TUBE;  Surgeon: Mandeep Park MD;  [facility]     ENDOSCOPIC RETROGRADE CHOLANGIOPANCREATOGRAM N/A 2016    Procedure: COMBINED ENDOSCOPIC RETROGRADE CHOLANGIOPANCREATOGRAPHY, PLACE TUBE/STENT;  Surgeon: Mandeep Park MD;  [facility]     ENDOSCOPIC RETROGRADE CHOLANGIOPANCREATOGRAM N/A 2016    Procedure: COMBINED ENDOSCOPIC RETROGRADE CHOLANGIOPANCREATOGRAPHY,  REMOVE FOREIGN BODY OR STENT/TUBE;  Surgeon: Mandeep Park MD;  [facility]     ENDOSCOPIC ULTRASOUND UPPER GASTROINTESTINAL TRACT (GI) N/A 10/3/2016    Procedure: ENDOSCOPIC ULTRASOUND, ESOPHAGOSCOPY / UPPER GASTROINTESTINAL TRACT (GI);  Surgeon: Guru Jose Rodas MD;  [facility]     ENTEROSCOPY SMALL BOWEL N/A 2/3/2022    Procedure: ENTEROSCOPY with possible fistula closure;  Surgeon: Francisco Dodson MD;  Location:  GI     ESOPHAGOSCOPY, GASTROSCOPY, DUODENOSCOPY (EGD), COMBINED N/A 6/24/2015    Procedure: COMBINED ESOPHAGOSCOPY, GASTROSCOPY, DUODENOSCOPY (EGD), REMOVE FOREIGN BODY;  Surgeon: Mandeep Park MD;  Location:  GI     ESOPHAGOSCOPY, GASTROSCOPY, DUODENOSCOPY (EGD), COMBINED N/A 10/25/2015    Procedure: COMBINED ESOPHAGOSCOPY, GASTROSCOPY, DUODENOSCOPY (EGD);  Surgeon: Sammy Amaro MD;  Location:  GI     ESOPHAGOSCOPY, GASTROSCOPY, DUODENOSCOPY (EGD), COMBINED N/A 10/25/2015    Procedure: COMBINED ESOPHAGOSCOPY, GASTROSCOPY, DUODENOSCOPY (EGD), BIOPSY SINGLE OR MULTIPLE;  Surgeon: Sammy Amaro MD;  Location:  GI     ESOPHAGOSCOPY, GASTROSCOPY, DUODENOSCOPY (EGD), DILATATION, COMBINED       EXCISE LESION TRUNK N/A 4/17/2017    Procedure: EXCISE LESION TRUNK;  Removal of Abdominal Foreign Body;  Surgeon: Nestor Phoenix MD;  [facility]     HC ESOPH/GAS REFLUX TEST W NASAL IMPED >1 HR N/A 11/19/2015    Procedure: ESOPHAGEAL IMPEDENCE FUNCTION TEST WITH 24 HOUR PH GREATER THAN 1 HOUR;  Surgeon: Thiago Apple MD;  [facility]     HC UGI ENDOSCOPY DIAG W BIOPSY  9/17/08     HC UGI ENDOSCOPY DIAG W BIOPSY  9/27/12     HC UGI ENDOSCOPY W ESOPHAGEAL DILATION BALLOON <30MM  9/17/08     HC UGI ENDOSCOPY W EUS N/A 5/5/2015    Procedure: COMBINED ENDOSCOPIC ULTRASOUND, ESOPHAGOSCOPY, GASTROSCOPY, DUODENOSCOPY (EGD);  Surgeon: Wm Dueñas MD;  [facility]     HC WRIST ARTHROSCOP,RELEASE XVERS LIG Bilateral 12/17/08      INJECT TRANSVERSUS ABDOMINIS PLANE (TAP) BLOCK BILATERAL Left 2016    Procedure: INJECT TRANSVERSUS ABDOMINIS PLANE (TAP) BLOCK BILATERAL;  Surgeon: Dickson Corrigan MD;  [facility]     INJECT TRIGGER POINT Bilateral 2022    Procedure: Abdominal trigger point injection with ultrasound;  Surgeon: Monika Mahajan MD;  [facility]     IR CHEST PORT PLACEMENT < 5 YRS OF AGE  6/10/2022     laparoscopic pineda       LAPAROSCOPIC HERNIORRHAPHY INCISIONAL N/A 2018    Procedure: LAPAROSCOPIC HERNIORRHAPHY INCISIONAL;  Laparoscopic Incisional Hernia Repair with Symbotex Mesh Implant;  Surgeon: Nestor Phoenix MD;  [facility]     LAPAROSCOPIC PANCREATECTOMY, TRANSPLANT AUTO ISLET CELL N/A 2016    Procedure: LAPAROSCOPIC PANCREATECTOMY, TRANSPLANT AUTO ISLET CELL;  Surgeon: Nestor Phoenix MD;  [facility]     REMOVE GASTROSTOMY TUBE ADULT N/A 2022    Procedure: REMOVAL, GASTROSTOMY TUBE, ADULT;  Surgeon: Mandeep Park MD;  [facility]     REPLACE GASTROJEJUNOSTOMY TUBE, PERCUTANEOUS N/A 2021    Procedure: GASTROJEJUNOSTOMY TUBE PLACEMENT, PERCUTANEOUS, WITH GASTROPEXY;  Surgeon: Mandeep Park MD;  [facility]     REPLACE GASTROJEJUNOSTOMY TUBE, PERCUTANEOUS N/A 2021    Procedure: REPLACEMENT, GASTROJEJUNOSTOMY TUBE, PERCUTANEOUS;  Surgeon: Zackery Montoya MD;  [facility]     REPLACE JEJUNOSTOMY TUBE, PERCUTANEOUS N/A 9/10/2021    Procedure: UPPER ENDOSCOPY, REPLACEMENT OF PERCUTANEOUS GASTROJEJUNOSTOMY TUBE, T-TAG GASTROPEXY;  Surgeon: Zackery Montoya MD;  [facility]     REPLACE JEJUNOSTOMY TUBE, PERCUTANEOUS N/A 2021    Procedure: REPLACEMENT, JEJUNOSTOMY TUBE, PERCUTANEOUS;  Surgeon: Mandeep Park MD;  [facility]     transphenoidal pituitary resection       ZZC  DELIVERY ONLY       ZZC  DELIVERY ONLY      repeat c section with incidental cystotomy with repair     ZZC EXCIS  PITUITARY,TRANSNASAL/SEPTAL  1980    pituitary tumor removed for diabetes insipidus     ZZC TOTAL ABDOM HYSTERECTOMY  1999    w/ bilateral salpingoophorectomy           Family History:  Family History   Problem Relation Age of Onset     Lipids Mother      Hypertension Mother      Thyroid Disease Mother      Depression Mother      Angina Mother      GERD Mother      Skin Cancer Mother      Migraines Mother      Eye Disorder Father         cataract, detached retina     Myocardial Infarction Father 60     Lipids Father      Cerebrovascular Disease Father      Depression Father      Substance Abuse Father      Anesthesia Reaction Father         stroke right after surgery     Cataracts Father      Osteoarthritis Father      Ulcerative Colitis Father      Interpersonal Violence Sister      Coronary Artery Disease Sister         angioplasty     GERD Sister      Substance Abuse Sister      Depression Sister      Thyroid Disease Sister      Eye Disorder Maternal Grandmother         cataract     Thyroid Disease Maternal Grandmother      Diabetes Maternal Grandfather      Eye Disorder Paternal Grandmother         cataract     Eye Disorder Paternal Grandfather         cataract     Diabetes Paternal Grandfather      Eye Disorder Son         ptosis     Depression Son      Anxiety Disorder Son      Heart Disease Paternal Aunt      Diabetes Paternal Aunt      Diabetes Paternal Uncle      Heart Disease Paternal Uncle      Depression Nephew      Anxiety Disorder Nephew      Thyroid Disease Nephew      Diabetes Type 2  Cousin         paternal cousin          Social History:  Social History     Socioeconomic History     Marital status:      Spouse name: Norris     Number of children: 3     Years of education: 16     Highest education level: Not on file   Occupational History     Occupation: director     Employer: [employer]   Tobacco Use     Smoking status: Former     Packs/day: 0.50     Years: 6.00     Pack years: 3.00     Types:  Cigarettes     Start date: 1985     Quit date: 1992     Years since quittin.8     Smokeless tobacco: Never     Tobacco comments:     no 2nd hand   Substance and Sexual Activity     Alcohol use: Not Currently     Alcohol/week: 3.0 - 6.0 standard drinks     Types: 1 - 2 Glasses of wine, 1 - 2 Cans of beer, 1 - 2 Shots of liquor per week     Comment: none since IGG     Drug use: No     Sexual activity: Yes     Partners: Male     Birth control/protection: None     Comment: Hystectomy    Other Topics Concern     Parent/sibling w/ CABG, MI or angioplasty before 65F 55M? No      Service Not Asked     Blood Transfusions No     Caffeine Concern Not Asked     Occupational Exposure Not Asked     Hobby Hazards Not Asked     Sleep Concern Not Asked     Stress Concern Not Asked     Weight Concern Not Asked     Special Diet Not Asked     Back Care Not Asked     Exercise Not Asked     Bike Helmet Not Asked     Seat Belt Yes     Self-Exams Not Asked   Social History Narrative     with 3 children and a dog.  No smoking, etoh or drug use.  Worked as a  for [employer] [place] in the past.  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     Social Determinants of Health     Financial Resource Strain: Not on file   Food Insecurity: Not on file   Transportation Needs: Not on file   Physical Activity: Not on file   Stress: Not on file   Social Connections: Not on file   Intimate Partner Violence: Not on file   Housing Stability: Not on file     Social History     Social History Narrative     with 3 children and a dog.  No smoking, etoh or drug use.  Worked as a  for [employer] [place] in the past.  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          Allergies:  Allergies   Allergen Reactions     Apple Anaphylaxis     Corticosteroids Other (See Comments)     All oral, IV and injectable steroids are  contraindicated (unless in life threatening situations) in [facility] transplant recipients. They can cause irreversible loss of islet cell function. Please contact patient's transplant care coordinator ADI Gaffney RN at [phone]/pager [phone] and/or endocrinologist prior to administration.       Depakote [Divalproex Sodium] Other (See Comments)     Chest pain     Zithromax [Azithromycin Dihydrate] Anaphylaxis     Noted in 4/7/08 ER     Bromfenac      Other reaction(s): Headache     Codeine      Other reaction(s): Hallucinations     Darvocet [Propoxyphene N-Apap] Itching     Hydromorphone Other (See Comments)     Loopy     Morphine Nausea and Vomiting and Rash     Nalbuphine Hcl Rash     RASH :nubaine     Zosyn [Piperacillin-Tazobactam In D5w] Rash     Possible allergy, did have a diffuse rash that seemed drug related but could have also been related to soap in the hospital.      Bactrim [Sulfamethoxazole W-Trimethoprim] Other (See Comments) and Nausea and Vomiting     Severely low liver function.     Compazine [Prochlorperazine] Other (See Comments)     Twitching. Takes Benedryl and is fine     Tape [Adhesive Tape] Blisters     Tramadol      Zofran [Ondansetron] Other (See Comments)     migraine     Flagyl [Metronidazole] Hives and Rash       Current Medications:   Current Outpatient Medications   Medication Sig Dispense Refill     acarbose (PRECOSE) 50 MG tablet Take 1 tablet (50 mg) by mouth 3 times daily (with meals) Increase to 100 mg three times daily as needed. 180 tablet 5     amylase-lipase-protease (CREON) 18852-13717 units CPEP per EC capsule Take 3-4 capsules by mouth 3 times daily (with meals) With oral meals 150 capsule 11     azelastine (ASTELIN) 0.1 % nasal spray Spray 1 spray into both nostrils 2 times daily 1 Bottle 11     blood glucose (PAT CONTOUR NEXT) test strip Use to test blood sugar 6 times daily or as directed. 2 Box 11     blood glucose monitoring (PAT MICROLET) lancets Use  "to test blood sugar 6-8 times daily or as directed. 100 each 11     cetirizine (ZYRTEC) 10 MG tablet Take 1 tablet (10 mg) by mouth daily 90 tablet 3     Continuous Blood Gluc Sensor (FREESTYLE LISA 2 SENSOR) MISC Inject 1 patch into the skin every 14 days 2 each 11     cyclobenzaprine (FLEXERIL) 10 MG tablet Take 1 tablet (10 mg) by mouth 2 times daily as needed for muscle spasms 90 tablet 3     diphenhydrAMINE (BENADRYL ALLERGY) 25 MG capsule Take 1 capsule (25 mg) by mouth every 6 hours as needed for itching or allergies 56 capsule 0     estradiol (VAGIFEM) 10 MCG TABS vaginal tablet INSERT 1 TABLET(10 MCG) VAGINALLY 2 TIMES A WEEK 24 tablet 2     famotidine (PEPCID) 20 MG tablet Take 1 tablet (20 mg) by mouth daily 90 tablet 3     glucagon 1 MG kit Give 0.1 to 0.15mg( 10-15 units on Insulin sryinge) subcutaneous  every 15 minutes PRN for hypoglycemia. Remix new kit q24hr. Needs up to 3 kit/week. 10 each 3     glucose 40 % GEL gel Take 15-30 g by mouth every 15 minutes as needed for low blood sugar 3 Tube 2     Hydroactive Dressings (TEGADERM HYDROCOLLOID THIN) MISC Use under sensor site , change every 14 days 2 each 11     HYDROmorphone, STANDARD CONC, (DILAUDID) 1 MG/ML oral solution Take 1-2 mLs (1-2 mg) by mouth every 4 hours as needed for moderate to severe pain (Post G and J tube placement) 168 mL 0     ibuprofen (ADVIL/MOTRIN) 400 MG tablet Take 1 tablet (400 mg) by mouth every 6 hours as needed for moderate pain 30 tablet 0     insulin syringe-needle U-100 (30G X 1/2\" 0.3 ML) 30G X 1/2\" 0.3 ML miscellaneous Use 3 syringes daily or as directed. 50 each 1     levothyroxine (SYNTHROID/LEVOTHROID) 137 MCG tablet Take 1 tablet (137 mcg) by mouth daily 90 tablet 3     Lidocaine (LIDOCARE) 4 % Patch Place 1 patch onto the skin every 24 hours To prevent lidocaine toxicity, patient should be patch free for 12 hrs daily. 10 patch 0     methocarbamol (ROBAXIN) 750 MG tablet Take 750 mg by mouth 4 times daily       " montelukast (SINGULAIR) 10 MG tablet TAKE 1 TABLET(10 MG) BY MOUTH AT BEDTIME 90 tablet 3     Multiple Vitamin (MULTIVITAMIN ADULT PO) Take 1 tablet by mouth daily       Nutritional Supplements (BOOST HIGH PROTEIN) LIQD After above baseline labs are drawn, give: 6 mL/kg to maximum of 360 mL; the beverage is to be consumed within 5 minutes.  0     omeprazole (PRILOSEC) 40 MG DR capsule Take 1 capsule (40 mg) by mouth daily 90 capsule 3     order for DME Equipment being ordered:Cefaly 1 Device 0     prochlorperazine (COMPAZINE) 10 MG tablet TAKE 1/2 TABLET(5 MG) BY MOUTH EVERY 6 HOURS AS NEEDED FOR NAUSEA OR VOMITING 60 tablet 2     promethazine (PHENERGAN) 25 MG tablet Take 25 mg by mouth       promethazine (PHENERGAN) 25 MG tablet Take 1 tablet (25 mg) by mouth daily as needed (take as needed for nausea/vomiting) 60 tablet 0     Prucalopride Succinate 2 MG TABS Take 1 tablet (2 mg) by mouth daily 30 tablet 11     scopolamine (TRANSDERM) 1 MG/3DAYS 72 hr patch Place 1 patch onto the skin every 72 hours 10 patch 11     scopolamine (TRANSDERM) 1 MG/3DAYS 72 hr patch Place 1 patch onto the skin every 72 hours 10 patch 11     Sharps Container (BD SHARPS ) MISC 1 Container as needed 1 each 1     study - domperidone, IDS# [id number], 10 MG tablet Take 2 tablets (20 mg) by mouth 4 times daily 240 tablet 1     ZOLMitriptan (ZOMIG) 5 MG tablet TAKE 1 TABLET BY MOUTH DAILY AS NEEDED FOR MIGRAINE. ONLY TAKE 2 TABLETS IN A 24 HR PERIOD. MAY USE 1 TO 2 TIMES PER WEEK       ZOLMitriptan (ZOMIG-ZMT) 5 MG ODT Take 1 tablet (5 mg) by mouth at onset of headache for migraine May repeat in 2 hours. Max 2 tablets/24 hours. 18 tablet 6     study - domperidone, IDS# [id number], 10 MG tablet Take 2 tablets (20 mg) by mouth 4 times daily 240 tablet 1             Physical Exam:     /82 (BP Location: Right arm, Patient Position: Chair, Cuff Size: Adult Regular)   Pulse 94   SpO2 97%     General Appearance: No distress, seated  comfortably  Mood: Euthymic  HE ENT: Non constricted pupils  Respiratory: Non labored breathing  GI: Soft, non distended, TTP right abdomen  Skin: No rashes over exposed skin  MS: Normal muscle bulk  Neuro: Cranial nerves 2-12 intact  Gait: non antalgic, ambulates with out assistance    Pain specific exam:    Carnett's positive.     ASSESSMENT AND PLAN:     Encounter Diagnosis:  Myofasial Pain  Abdominal pain  TAWANDA Mcghee is a 57 year old year old female  who presents to the pain clinic with severe right abdominal pain, s/p TPI with complete resolution of left abdomen pain.     RECOMMENDATIONS:     1. Medications: We are prescribing the patient renewal of methocarbimol. Dosing, side effects, risks/benefits/alternatives were discussed with the patient in detail.    2. Procedure: We are scheduling the patient for Trigger point injections right abdomen with ultrasound guidance in the procedure suite. Risks/benefits/alternatives were discussed.   We discussed the option of botox injectins.   Follow up: 3-4 weeks after TPI      Again, thank you for allowing me to participate in the care of your patient.      Sincerely,    Monika Mahajan MD

## 2022-10-14 PROBLEM — M79.18 MYOFASCIAL PAIN: Status: ACTIVE | Noted: 2022-10-14

## 2022-10-14 NOTE — TELEPHONE ENCOUNTER
Patient is scheduled for procedure with Dr. Mahajan    Spoke with: patient    Date of Procedure: 11-15-22    Location: [facility]    Informed patient they will need an adult  yes    Pre-procedure COVID-19 Test: @ home    Additional comments: Arrival @ 1 pm    Patient is aware pre-op RN will call 2-3 days prior to procedure with arrival time and instructions. yes      Ling Awad on 10/14/2022 at 12:16 PM

## 2022-10-15 DIAGNOSIS — E16.2 HYPOGLYCEMIA: Primary | ICD-10-CM

## 2022-10-15 RX ORDER — ACARBOSE 100 MG/1
100 TABLET ORAL
Qty: 90 TABLET | Refills: 3 | Status: SHIPPED | OUTPATIENT
Start: 2022-10-15 | End: 2023-05-09

## 2022-10-21 ENCOUNTER — TELEPHONE (OUTPATIENT)
Dept: GASTROENTEROLOGY | Facility: CLINIC | Age: 57
End: 2022-10-21

## 2022-10-21 NOTE — TELEPHONE ENCOUNTER
Spoke with patient over the phone about rescheduling her 11/9 in person appointment with Dr Park due to a schedule change. Patient was concerned about pushing the appointment out even further. Sent a message to RNs for further instructions.

## 2022-10-24 ENCOUNTER — PATIENT OUTREACH (OUTPATIENT)
Dept: GASTROENTEROLOGY | Facility: CLINIC | Age: 57
End: 2022-10-24
Payer: COMMERCIAL

## 2022-10-24 DIAGNOSIS — K31.84 GASTROPARESIS: Primary | ICD-10-CM

## 2022-10-24 NOTE — TELEPHONE ENCOUNTER
Pt still having ongoing GI issues. Still venting 3-4x weeks, no change since last Domperidone increase to 20mg QID- EKG ordered for 2 month check in, clinic scheduled 11-2  Pt getting botox for migraines, botox also causes nausea- pt finding that IV meds works the best    Clinic scheduled 11-2  ML

## 2022-10-31 ENCOUNTER — HOME INFUSION (PRE-WILLOW HOME INFUSION) (OUTPATIENT)
Dept: PHARMACY | Facility: CLINIC | Age: 57
End: 2022-10-31

## 2022-11-01 ENCOUNTER — DOCUMENTATION ONLY (OUTPATIENT)
Dept: PHARMACY | Facility: CLINIC | Age: 57
End: 2022-11-01

## 2022-11-01 DIAGNOSIS — Z90.410 POST-PANCREATECTOMY DIABETES (H): Primary | ICD-10-CM

## 2022-11-01 DIAGNOSIS — E89.1 POST-PANCREATECTOMY DIABETES (H): Primary | ICD-10-CM

## 2022-11-01 DIAGNOSIS — E13.9 POST-PANCREATECTOMY DIABETES (H): Primary | ICD-10-CM

## 2022-11-01 NOTE — PROGRESS NOTES
ASSESSMENT / PLAN   #1 Diabetes Mellitus Type 1 Without Complication (HCC); Acquired Total Absence Pancreas  F/u ENDO  Does not want statin therapy or meds for lipids based on her medical complexity, ongoing battles with health, nutrition, GI symptoms.    Dinora is not on statin therapy - this was discussed with her PCP on 10/4/22 and risk was thought to outweigh benefit.

## 2022-11-02 ENCOUNTER — APPOINTMENT (OUTPATIENT)
Dept: LAB | Facility: CLINIC | Age: 57
End: 2022-11-02
Attending: INTERNAL MEDICINE
Payer: COMMERCIAL

## 2022-11-02 ENCOUNTER — OFFICE VISIT (OUTPATIENT)
Dept: GASTROENTEROLOGY | Facility: CLINIC | Age: 57
End: 2022-11-02
Payer: COMMERCIAL

## 2022-11-02 VITALS
DIASTOLIC BLOOD PRESSURE: 79 MMHG | BODY MASS INDEX: 21.87 KG/M2 | SYSTOLIC BLOOD PRESSURE: 120 MMHG | HEIGHT: 68 IN | WEIGHT: 144.3 LBS | HEART RATE: 94 BPM

## 2022-11-02 DIAGNOSIS — K83.4 SPHINCTER OF ODDI DYSFUNCTION: ICD-10-CM

## 2022-11-02 DIAGNOSIS — K86.89 PANCREATIC INSUFFICIENCY: ICD-10-CM

## 2022-11-02 DIAGNOSIS — K31.84 GASTROPARESIS: Primary | ICD-10-CM

## 2022-11-02 PROCEDURE — 93000 ELECTROCARDIOGRAM COMPLETE: CPT

## 2022-11-02 PROCEDURE — 99215 OFFICE O/P EST HI 40 MIN: CPT | Performed by: INTERNAL MEDICINE

## 2022-11-02 ASSESSMENT — PAIN SCALES - GENERAL: PAINLEVEL: MILD PAIN (3)

## 2022-11-02 NOTE — LETTER
"    11/2/2022         RE: Dinora Mcghee  [address]  [facility] [address]        Dear Colleague,    Thank you for referring your patient, Dinora Mcghee, to the [facility] [place]. Please see a copy of my visit note below.    Chief Complaint   Patient presents with     Follow Up     F/U Domperidone 2 month visit- dose increase to 30mg QID       Vitals:    11/02/22 0940   BP: 120/79   BP Location: Right arm   Cuff Size: Adult Small   Pulse: 94   Weight: 65.5 kg (144 lb 4.8 oz)   Height: 1.721 m (5' 7.75\")       Body mass index is 22.1 kg/m .    Eder AGRAWAL Panc/Bili Follow Up    Subjective:  Dinora Mcghee is a 58 y/o woman with gastroparesis secondary to pancreatic insufficiency who has been enrolled on domperidone trial coming in for follow up. She is currently on 20mg bid. She says that she has had some benefit for it, but continues to have symptoms of gastroparesis and feels like she would benefit from higher doses. She denies palpitations, chest pains, dizziness. She reports no adverse effects from domperidone.    She has had issues with PO intake due to nausea and early satiety. She continues to use the G tube w/o issues. She wonders if GJ tube might help her with keeping up with caloric intake.       Most Recent EKG    Patient Outreach on 10/24/2022   Component Date Value Ref Range Status     Ventricular Rate 11/02/2022 79  BPM Incomplete     Atrial Rate 11/02/2022 79  BPM Incomplete     DC Interval 11/02/2022 142  ms Incomplete     QRS Duration 11/02/2022 76  ms Incomplete     QT 11/02/2022 396  ms Incomplete     QTc 11/02/2022 454  ms Incomplete     P Axis 11/02/2022 -2  degrees Incomplete     R AXIS 11/02/2022 40  degrees Incomplete     T Axis 11/02/2022 10  degrees Incomplete     Interpretation ECG 11/02/2022    Incomplete                    Value:Sinus rhythm  Cannot rule out Anterior infarct , age undetermined  Abnormal ECG  When compared with ECG of " 14-SEP-2022 07:52,  Nonspecific T wave abnormality now evident in Inferior leads       General: Sitting in office comfortably, in no acute distress  Respiratory: Breathing comfortably in RA  Abdominal: no tenderness or distension, PEG in place. No erythema or discharge.  Neuro: No deficits to gross examination      A/P: Need to continue to optimize therapy for Ms Mcghee's gastroparesis as she continued to be symptomatic on domperidone  -Increase domperidone to 30mg TID due to partial response  -Repeat EKG for QTC monitoring   -Continue Pain management as per clinic     German Velez Reyes, MD, PhD  Internal Medicine, PGY1    Seen and examined with GI fellow, agree with findings and recommendations.  45 minutes in person  Will have to check QTC before upping dose to 30mg tid  Also suggested trying AMT brand GJ which seems superior to others that have failed.  Pt will consider latter  Follow-up after revfewing QTC        Again, thank you for allowing me to participate in the care of your patient.      Sincerely,    Mandeep Park MD

## 2022-11-02 NOTE — PROGRESS NOTES
GI Panc/Bili Follow Up    Subjective:  Dinora Mcghee is a 56 y/o woman with gastroparesis secondary to pancreatic insufficiency who has been enrolled on domperidone trial coming in for follow up. She is currently on 20mg bid. She says that she has had some benefit for it, but continues to have symptoms of gastroparesis and feels like she would benefit from higher doses. She denies palpitations, chest pains, dizziness. She reports no adverse effects from domperidone.    She has had issues with PO intake due to nausea and early satiety. She continues to use the G tube w/o issues. She wonders if GJ tube might help her with keeping up with caloric intake.       Most Recent EKG    Patient Outreach on 10/24/2022   Component Date Value Ref Range Status     Ventricular Rate 11/02/2022 79  BPM Incomplete     Atrial Rate 11/02/2022 79  BPM Incomplete     [place] Interval 11/02/2022 142  ms Incomplete     QRS Duration 11/02/2022 76  ms Incomplete     QT 11/02/2022 396  ms Incomplete     QTc 11/02/2022 454  ms Incomplete     P Axis 11/02/2022 -2  degrees Incomplete     R AXIS 11/02/2022 40  degrees Incomplete     T Axis 11/02/2022 10  degrees Incomplete     Interpretation ECG 11/02/2022    Incomplete                    Value:Sinus rhythm  Cannot rule out Anterior infarct , age undetermined  Abnormal ECG  When compared with ECG of 14-SEP-2022 07:52,  Nonspecific T wave abnormality now evident in Inferior leads       General: Sitting in office comfortably, in no acute distress  Respiratory: Breathing comfortably in RA  Abdominal: no tenderness or distension, PEG in place. No erythema or discharge.  Neuro: No deficits to gross examination      A/P: Need to continue to optimize therapy for Ms Mcghee's gastroparesis as she continued to be symptomatic on domperidone  -Increase domperidone to 30mg TID due to partial response  -Repeat EKG for QTC monitoring   -Continue Pain management as per clinic     German Velez Reyes, MD, PhD  Internal  Medicine, PGY1    Seen and examined with GI fellow, agree with findings and recommendations.  45 minutes in person  Will have to check QTC before upping dose to 30mg tid  Also suggested trying AMT brand GJ which seems superior to others that have failed.  Pt will consider latter  Follow-up after revfewing QTC  Mandeep Park MD GI Staff

## 2022-11-02 NOTE — PROGRESS NOTES
"Chief Complaint   Patient presents with     Follow Up     F/U Domperidone 2 month visit- dose increase to 30mg QID       Vitals:    11/02/22 0940   BP: 120/79   BP Location: Right arm   Cuff Size: Adult Small   Pulse: 94   Weight: 65.5 kg (144 lb 4.8 oz)   Height: 1.721 m (5' 7.75\")       Body mass index is 22.1 kg/m .    Eder Jean    "

## 2022-11-03 ENCOUNTER — PREP FOR PROCEDURE (OUTPATIENT)
Dept: GASTROENTEROLOGY | Facility: CLINIC | Age: 57
End: 2022-11-03

## 2022-11-03 ENCOUNTER — PATIENT OUTREACH (OUTPATIENT)
Dept: GASTROENTEROLOGY | Facility: CLINIC | Age: 57
End: 2022-11-03

## 2022-11-03 DIAGNOSIS — K31.84 GASTROPARESIS: Primary | ICD-10-CM

## 2022-11-03 LAB
ATRIAL RATE - MUSE: 79 BPM
DIASTOLIC BLOOD PRESSURE - MUSE: NORMAL MMHG
INTERPRETATION ECG - MUSE: NORMAL
P AXIS - MUSE: -2 DEGREES
PR INTERVAL - MUSE: 142 MS
QRS DURATION - MUSE: 76 MS
QT - MUSE: 396 MS
QTC - MUSE: 454 MS
R AXIS - MUSE: 40 DEGREES
SYSTOLIC BLOOD PRESSURE - MUSE: NORMAL MMHG
T AXIS - MUSE: 10 DEGREES
VENTRICULAR RATE- MUSE: 79 BPM

## 2022-11-03 NOTE — TELEPHONE ENCOUNTER
After review of EKG, per Dr. Park  Lets hold off increase for now. Hasnt had any significant response so not worth pushing the QTC envelope in her.   Hopefulloy she will come around to trying AMT brand GJ. I am hopeful they are different enough     Called patient to discuss, left message    Pt called back, explained QTC findings. Pt doesn't think that Domperidone is helping. Is going to stop medication. Pt wants to pursue GJ, message sent to Dr. Park    Please assist in scheduling:     Procedure/Imaging/Clinic: GJ replacement  Physician: Dr. Park  Timing: tbd  Procedure length:provider average  Anesthesia:gen  Dx: gastroparesis  Tier:2  Location: [facility]     ML

## 2022-11-06 NOTE — PROGRESS NOTES
This is a recent snapshot of the patient's [facility] medical record.  For current drug dose and complete information and questions, call [phone] or In Basket pool, [facility] home infusion ([id number])  CSN Number:  [id number]

## 2022-11-09 ENCOUNTER — TRANSFERRED RECORDS (OUTPATIENT)
Dept: MULTI SPECIALTY CLINIC | Facility: CLINIC | Age: 57
End: 2022-11-09

## 2022-11-09 LAB — HBA1C MFR BLD: 5.5 % (ref 0–5.6)

## 2022-11-10 ENCOUNTER — VIRTUAL VISIT (OUTPATIENT)
Dept: GASTROENTEROLOGY | Facility: CLINIC | Age: 57
End: 2022-11-10
Payer: COMMERCIAL

## 2022-11-10 DIAGNOSIS — K31.84 GASTROPARESIS: Primary | ICD-10-CM

## 2022-11-10 DIAGNOSIS — K86.89 PANCREATIC INSUFFICIENCY: ICD-10-CM

## 2022-11-10 DIAGNOSIS — R14.0 ABDOMINAL BLOATING: ICD-10-CM

## 2022-11-10 DIAGNOSIS — Z90.410 HISTORY OF PANCREATECTOMY: ICD-10-CM

## 2022-11-10 DIAGNOSIS — Z90.410 POST-PANCREATECTOMY DIABETES (H): ICD-10-CM

## 2022-11-10 DIAGNOSIS — E89.1 POST-PANCREATECTOMY DIABETES (H): ICD-10-CM

## 2022-11-10 DIAGNOSIS — E13.9 POST-PANCREATECTOMY DIABETES (H): ICD-10-CM

## 2022-11-10 PROCEDURE — 97803 MED NUTRITION INDIV SUBSEQ: CPT | Mod: 95 | Performed by: DIETITIAN, REGISTERED

## 2022-11-10 NOTE — PROGRESS NOTES
Dinora is a 57 year old who is being evaluated via a billable video visit.      How would you like to obtain your AVS? MyChart  If the video visit is dropped, the invitation should be resent by: Text to cell phone: [phone]  Will anyone else be joining your video visit? No    Video-Visit Details    Video Start Time: 11:30 AM    Type of service:  Video Visit    Video End Time:12:10 PM    Originating Location (pt. Location): Home    Distant Location (provider location):  Off-site    Platform used for Video Visit: [facility] Outpatient Medical Nutrition Therapy      Time Spent:  40 minutes  Session Type:  Follow-up   Referring Physician:  Dr. Mandeep Park  Reason for RD Visit:   Gastroparesis, hx TPIAT, pancreatic insufficiency, diabetes, will have G-J feeding tube placed     Nutrition Assessment:  Patient is a 57 year old female with history that includes gastroparesis, hx TPIAT, post pancreatectomy diabetes. She currently has a G tube in place but unable to use it for tube feeding and only for venting at this time. Next week, she will have a G-J feeding tube placed. She had been on domperidone trial, but stated that she failed this trial/med. She is eating some of an oral diet, but how much and if she can eat on a given day varies. She limits meal portion to 1 cup or less of foods and aims for eating 5-6 cups per day, but some days cannot eat anything due to very significant bloating, pain and vomiting. She has to vent on those days and estimated venting 4-5 times per week. She is getting IV fluids which increased to every other day. She has been able to maintain her weight to about 135-145 lbs. Her weight can drop about 4-5 lbs before she gets IV fluids, when she was getting them 3 times per week but now just increased to every other day. She also stated that her blood sugars are all over the place and yesterday at one point was above 200 but then last night got down to 52. She feels like  "getting steady tube feeding will help give her enough nutrition/steady nutrition to hopefully help with her blood sugars but also improved nutritional status. Previously when she was getting TF, she was getting the [employer] peptide 1.5 formula and using relizorb cartridges for enzymes. This formula was tolerated but was very thick and she had to water it down. Overall, she has had TF a couple of times before so typically has to initiate on TF slowly due to less tolerance when gets too much formula too fast when initiating on TF from her past experiences. Based on conversation today about semi elemental formula, she is okay with trying another semi elemental formula as she previously had [employer] formula but since it was too thick for her she had to water it down. She believes that she will be admitted at first with starting TF. Due to formulary available in hospital, then recommend Vital 1.5 (has a small amount of fiber and based on recommendations below will provide a total of 6 grams fiber for the entire day.     Height:   Ht Readings from Last 1 Encounters:   11/02/22 1.721 m (5' 7.75\")     Weight:  Wt Readings from Last 10 Encounters:   11/02/22 65.5 kg (144 lb 4.8 oz)   09/08/22 66.2 kg (146 lb)   09/07/22 66.5 kg (146 lb 9.6 oz)   08/01/22 65.1 kg (143 lb 8 oz)   06/10/22 64 kg (141 lb)   06/08/22 64 kg (141 lb)   06/02/22 63.8 kg (140 lb 9.6 oz)   05/03/22 63.7 kg (140 lb 6.4 oz)   02/17/22 61.7 kg (136 lb)   02/17/22 61.9 kg (136 lb 7.4 oz)     BMI: Estimated body mass index is 22.1 kg/m  as calculated from the following:    Height as of 11/2/22: 1.721 m (5' 7.75\").    Weight as of 11/2/22: 65.5 kg (144 lb 4.8 oz).    Diet Recall:  (some usual/recent meals/snacks/beverages): getting 2 bags lactated ringer every other day. Eating lower fiber and lower sugar. Eats 1 cup food at a meals and aims for 5-6 cups of food per day. Some days feels fine to eat like yesterday but today not feeling well so having to " vent. Estimated that she has to vent 4-5 days.  Meal Food    Breakfast Yesterday plain greek yogurt with fruit or Protein drink (premier), V8, plain greek yogurt or homemade smoothie made with soy protein powder, nutritional yeast   Lunch Cooked carrots, white rice or Little pasta   Dinner mashed potato/sweet potato, little chicken or may have cooked carrots, chicken, turkey   Snacks ~1-2 snacks per day: pretzels and peanut butter, 1/2 shayan bar in am and afternoon   Beverages V8, fairlife milk, chocolate, water, drinks juice if BS low          Labs:    Last Comprehensive Metabolic Panel:  Sodium   Date Value Ref Range Status   08/01/2022 142 133 - 144 mmol/L Final   12/17/2020 142 133 - 144 mmol/L Final     Potassium   Date Value Ref Range Status   08/01/2022 4.1 3.4 - 5.3 mmol/L Final   12/17/2020 3.5 3.4 - 5.3 mmol/L Final     Chloride   Date Value Ref Range Status   08/01/2022 107 94 - 109 mmol/L Final   12/17/2020 106 94 - 109 mmol/L Final     Carbon Dioxide   Date Value Ref Range Status   12/17/2020 32 20 - 32 mmol/L Final     Carbon Dioxide (CO2)   Date Value Ref Range Status   08/01/2022 28 20 - 32 mmol/L Final     Anion Gap   Date Value Ref Range Status   08/01/2022 7 3 - 14 mmol/L Final   12/17/2020 4 3 - 14 mmol/L Final     Glucose   Date Value Ref Range Status   08/01/2022 113 (H) 70 - 99 mg/dL Final   12/17/2020 120 (H) 70 - 99 mg/dL Final     Urea Nitrogen   Date Value Ref Range Status   08/01/2022 14 7 - 30 mg/dL Final   12/17/2020 9 7 - 30 mg/dL Final     Creatinine   Date Value Ref Range Status   08/01/2022 0.67 0.52 - 1.04 mg/dL Final   12/17/2020 0.78 0.52 - 1.04 mg/dL Final     GFR Estimate   Date Value Ref Range Status   08/01/2022 >90 >60 mL/min/1.73m2 Final     Comment:     Effective December 21, 2021 eGFRcr in adults is calculated using the 2021 CKD-EPI creatinine equation which includes age and gender (Richard et al., NEJM, DOI: 10.1056/JZHEpv7784193)   12/17/2020 85 >60 mL/min/[1.73_m2] Final  "    Comment:     Non  GFR Calc  Starting 12/18/2018, serum creatinine based estimated GFR (eGFR) will be   calculated using the Chronic Kidney Disease Epidemiology Collaboration   (CKD-EPI) equation.       GFR, ESTIMATED POCT   Date Value Ref Range Status   12/03/2021 >60 >60 mL/min/1.73m2 Final     Calcium   Date Value Ref Range Status   08/01/2022 9.2 8.5 - 10.1 mg/dL Final   12/17/2020 9.1 8.5 - 10.1 mg/dL Final     CBC RESULTS:   Recent Labs   Lab Test 06/10/22  0855   WBC 6.3   RBC 3.99   HGB 12.5   HCT 38.4   MCV 96   MCH 31.3   MCHC 32.6   RDW 14.0          Pertinent Medications/vitamin and mineral supplements:      Current Outpatient Medications   Medication     acarbose (PRECOSE) 100 MG tablet     amylase-lipase-protease (CREON) 19613-80715 units CPEP per EC capsule     azelastine (ASTELIN) 0.1 % nasal spray     blood glucose (PAT CONTOUR NEXT) test strip     blood glucose monitoring (PAT MICROLET) lancets     cetirizine (ZYRTEC) 10 MG tablet     Continuous Blood Gluc Sensor (FREESTYLE LISA 2 SENSOR) MISC     cyclobenzaprine (FLEXERIL) 10 MG tablet     diphenhydrAMINE (BENADRYL ALLERGY) 25 MG capsule     estradiol (VAGIFEM) 10 MCG TABS vaginal tablet     famotidine (PEPCID) 20 MG tablet     glucagon 1 MG kit     glucose 40 % GEL gel     Hydroactive Dressings (TEGADERM HYDROCOLLOID THIN) MISC     HYDROmorphone, STANDARD CONC, (DILAUDID) 1 MG/ML oral solution     ibuprofen (ADVIL/MOTRIN) 400 MG tablet     insulin syringe-needle U-100 (30G X 1/2\" 0.3 ML) 30G X 1/2\" 0.3 ML miscellaneous     levothyroxine (SYNTHROID/LEVOTHROID) 137 MCG tablet     Lidocaine (LIDOCARE) 4 % Patch     methocarbamol (ROBAXIN) 750 MG tablet     montelukast (SINGULAIR) 10 MG tablet     Multiple Vitamin (MULTIVITAMIN ADULT PO)     Nutritional Supplements (BOOST HIGH PROTEIN) LIQD     omeprazole (PRILOSEC) 40 MG DR capsule     order for DME     prochlorperazine (COMPAZINE) 10 MG tablet     promethazine " (PHENERGAN) 25 MG tablet     promethazine (PHENERGAN) 25 MG tablet     Prucalopride Succinate 2 MG TABS     scopolamine (TRANSDERM) 1 MG/3DAYS 72 hr patch     scopolamine (TRANSDERM) 1 MG/3DAYS 72 hr patch     Sharps Container (BD SHARPS ) MISC     STATIN NOT PRESCRIBED (INTENTIONAL)     study - domperidone, IDS# [id number], 10 MG tablet     ZOLMitriptan (ZOMIG) 5 MG tablet     ZOLMitriptan (ZOMIG-ZMT) 5 MG ODT     acarbose (PRECOSE) 50 MG tablet     study - domperidone, IDS# [id number], 10 MG tablet     Current Facility-Administered Medications   Medication     Botulinum Toxin Type A (BOTOX) 200 units injection 155 Units     Food Allergies:  apple    Estimated Nutrition Needs based on ideal body weight of 65 k-1950 calories (25-30 kcals/kg), 65-78g protein (1-1.2g/kg), ~1 ml/kcal or total fluids per MD.     MALNUTRITION:  % Weight Loss:  None noted  % Intake:  <75% for >/= 3 months (moderate malnutrition)  Subcutaneous Fat Loss:  None observed  Muscle Loss:  None observed  Fluid Retention:  None noted    Malnutrition Diagnosis: Patient does not meet two of the above criteria necessary for diagnosing malnutrition  In Context of:  Chronic illness or disease    Nutrition Prescription: enteral nutrition and low fat, low fiber oral diet as tolerated    Tube Feeding recommendations for MD:     Recommend Vital 1.5 formula at goal rate of 45 ml/hr for 24 hours per day.     Recommend slower initiation of TF due to patient typically tolerates slower initiation of TF from past experiences.   Initiate TF at 10 ml per hour and increase by 20 ml every 4-6 hours as tolerated to goal rate of 45 ml/hr.     Once she is tolerating TF at goal rate for at least 24-48 hours, then okay to switch to cycle feed and can go as follows:     Day 1, increase to 60 ml/hr x 18 hours.  If tolerated, then day 2, increased to 75 ml/hr x 14 hours. If tolerated than on day 3 increase to 90 ml x 12 hours overnight (or other 12 hour period of  patient's preference). Therefore continue running tube feeding at 90 ml per hour x 12 hours.    Tube feeding provides 1620 calories, 73 g protein, 202 grams CHOs, 62 grams fat and 6 grams fiber and 825 ml free water. This meets ~100% of estimated kcals and 100% of estimated protein needs.    OR tube feeding recommendations per inpatient dietitian or home infusion dietitian.    Recommend relizorb for enzymes with tube feeding.    If relizorb is not an option, then can do the following for enzymes with TF:    Enzymes with Tube feeding recommendations:  A) Sodium Bicarb tablet (325 mg), 1 tablet q 4 hrs via Jtube. Administration Instructions: Crush 1 tablet and mix into 15 ml of warm water and use this solution to mix with Creon pancreatic enzymes. DO NOT administer directly into Jtube (to be mixed into TF formula with Creon enzyme - see Creon enzyme order below).    B) Creon 24, 1-2 capsules q 4 hrs via Jtube. Administration Instructions:   --When Tube feeding running continuously for 24 hours at 45 ml/hr then recommend Creon 24,000, 1 cap q 4 hours to provide 144,000 units lipase/day. To administer Enzymes with TF:     --When tube feeding running at 60-90 ml/hr, then Creon 24,000, 2 caps every 4 hours to provide 144,000-216,000 units lipase/day. (216,000 unils only temporary when running TF at 60 ml/hr. Otherwise will provide 144,000 units/day with TF).    **Open Creon capsule and empty contents into 15 ml sodium bicarb solution (see sodium bicarb order), let dissolve for about 20-30 minutes and then add this solution to the amount of TF formula hung in TF bag every 4 hrs *Note: this enzyme regimen with TF @ goal cycled infusion of 90 ml x 12 hours, then will provide approx 2,323 units of lipase/gram of total Fat daily and approp dosing initially for pancreatic insufficiency with more elemental TF formula.     Nutrition Intervention      Discussed diet education, review with tips and suggestions for oral diet.  Answered patient's questions re: enteral nutrition.    Patient verbalized understanding of education provided. See Goals below.     Goals:    1. Continue trying to 5-6 smaller meals per day as able that are lower in fat and lower in fiber.    2. In the morning, can try having just a small amount of your protein drink at first such as 2-4 oz to see if you better tolerate just a small amount to start, then can continue to sip on small amounts to finish your protein drink. From there if feeling good then can eat your breakfast.    Nutrition Monitoring and Evaluation: Will monitor adherence to nutrition recommendations at future RD visits.     Further Medical Nutrition Therapy:  Follow-up as needed. Will follow with home infusion RD.    Patient was encouraged to call/contact RD with any further questions.    Quyen Sanz, MS, RD, LD

## 2022-11-10 NOTE — LETTER
11/10/2022         RE: Dinora Mcghee  [address] [facility] [address]        Dear Colleague,    Thank you for referring your patient, Dinora Mcghee, to the [facility] [place]. Please see a copy of my visit note below.      [facility] Outpatient Medical Nutrition Therapy      Time Spent:  40 minutes  Session Type:  Follow-up   Referring Physician:  Dr. Mandeep Park  Reason for RD Visit:   Gastroparesis, hx TPIAT, pancreatic insufficiency, diabetes, will have G-J feeding tube placed     Nutrition Assessment:  Patient is a 57 year old female with history that includes gastroparesis, hx TPIAT, post pancreatectomy diabetes. She currently has a G tube in place but unable to use it for tube feeding and only for venting at this time. Next week, she will have a G-J feeding tube placed. She had been on domperidone trial, but stated that she failed this trial/med. She is eating some of an oral diet, but how much and if she can eat on a given day varies. She limits meal portion to 1 cup or less of foods and aims for eating 5-6 cups per day, but some days cannot eat anything due to very significant bloating, pain and vomiting. She has to vent on those days and estimated venting 4-5 times per week. She is getting IV fluids which increased to every other day. She has been able to maintain her weight to about 135-145 lbs. Her weight can drop about 4-5 lbs before she gets IV fluids, when she was getting them 3 times per week but now just increased to every other day. She also stated that her blood sugars are all over the place and yesterday at one point was above 200 but then last night got down to 52. She feels like getting steady tube feeding will help give her enough nutrition/steady nutrition to hopefully help with her blood sugars but also improved nutritional status. Previously when she was getting TF, she was getting the [employer] Informatic peptide 1.5 formula and using relizorb  "cartridges for enzymes. This formula was tolerated but was very thick and she had to water it down. Overall, she has had TF a couple of times before so typically has to initiate on TF slowly due to less tolerance when gets too much formula too fast when initiating on TF from her past experiences. Based on conversation today about semi elemental formula, she is okay with trying another semi elemental formula as she previously had [email or web address] formula but since it was too thick for her she had to water it down. She believes that she will be admitted at first with starting TF. Due to formulary available in hospital, then recommend Vital 1.5 (has a small amount of fiber and based on recommendations below will provide a total of 6 grams fiber for the entire day.     Height:   Ht Readings from Last 1 Encounters:   11/02/22 1.721 m (5' 7.75\")     Weight:  Wt Readings from Last 10 Encounters:   11/02/22 65.5 kg (144 lb 4.8 oz)   09/08/22 66.2 kg (146 lb)   09/07/22 66.5 kg (146 lb 9.6 oz)   08/01/22 65.1 kg (143 lb 8 oz)   06/10/22 64 kg (141 lb)   06/08/22 64 kg (141 lb)   06/02/22 63.8 kg (140 lb 9.6 oz)   05/03/22 63.7 kg (140 lb 6.4 oz)   02/17/22 61.7 kg (136 lb)   02/17/22 61.9 kg (136 lb 7.4 oz)     BMI: Estimated body mass index is 22.1 kg/m  as calculated from the following:    Height as of 11/2/22: 1.721 m (5' 7.75\").    Weight as of 11/2/22: 65.5 kg (144 lb 4.8 oz).    Diet Recall:  (some usual/recent meals/snacks/beverages): getting 2 bags lactated ringer every other day. Eating lower fiber and lower sugar. Eats 1 cup food at a meals and aims for 5-6 cups of food per day. Some days feels fine to eat like yesterday but today not feeling well so having to vent. Estimated that she has to vent 4-5 days.  Meal Food    Breakfast Yesterday plain greek yogurt with fruit or Protein drink (premier), V8, plain greek yogurt or homemade smoothie made with soy protein powder, nutritional yeast   Lunch Cooked carrots, white rice " or Little pasta   Dinner mashed potato/sweet potato, little chicken or may have cooked carrots, chicken, turkey   Snacks ~1-2 snacks per day: pretzels and peanut butter, 1/2 shayan bar in am and afternoon   Beverages V8, fairlife milk, chocolate, water, drinks juice if BS low          Labs:    Last Comprehensive Metabolic Panel:  Sodium   Date Value Ref Range Status   08/01/2022 142 133 - 144 mmol/L Final   12/17/2020 142 133 - 144 mmol/L Final     Potassium   Date Value Ref Range Status   08/01/2022 4.1 3.4 - 5.3 mmol/L Final   12/17/2020 3.5 3.4 - 5.3 mmol/L Final     Chloride   Date Value Ref Range Status   08/01/2022 107 94 - 109 mmol/L Final   12/17/2020 106 94 - 109 mmol/L Final     Carbon Dioxide   Date Value Ref Range Status   12/17/2020 32 20 - 32 mmol/L Final     Carbon Dioxide (CO2)   Date Value Ref Range Status   08/01/2022 28 20 - 32 mmol/L Final     Anion Gap   Date Value Ref Range Status   08/01/2022 7 3 - 14 mmol/L Final   12/17/2020 4 3 - 14 mmol/L Final     Glucose   Date Value Ref Range Status   08/01/2022 113 (H) 70 - 99 mg/dL Final   12/17/2020 120 (H) 70 - 99 mg/dL Final     Urea Nitrogen   Date Value Ref Range Status   08/01/2022 14 7 - 30 mg/dL Final   12/17/2020 9 7 - 30 mg/dL Final     Creatinine   Date Value Ref Range Status   08/01/2022 0.67 0.52 - 1.04 mg/dL Final   12/17/2020 0.78 0.52 - 1.04 mg/dL Final     GFR Estimate   Date Value Ref Range Status   08/01/2022 >90 >60 mL/min/1.73m2 Final     Comment:     Effective December 21, 2021 eGFRcr in adults is calculated using the 2021 CKD-EPI creatinine equation which includes age and gender (Richard wheat al., NEJM, DOI: 10.1056/WAHQrn1314671)   12/17/2020 85 >60 mL/min/[1.73_m2] Final     Comment:     Non  GFR Calc  Starting 12/18/2018, serum creatinine based estimated GFR (eGFR) will be   calculated using the Chronic Kidney Disease Epidemiology Collaboration   (CKD-EPI) equation.       GFR, ESTIMATED POCT   Date Value Ref Range  "Status   12/03/2021 >60 >60 mL/min/1.73m2 Final     Calcium   Date Value Ref Range Status   08/01/2022 9.2 8.5 - 10.1 mg/dL Final   12/17/2020 9.1 8.5 - 10.1 mg/dL Final     CBC RESULTS:   Recent Labs   Lab Test 06/10/22  0855   WBC 6.3   RBC 3.99   HGB 12.5   HCT 38.4   MCV 96   MCH 31.3   MCHC 32.6   RDW 14.0          Pertinent Medications/vitamin and mineral supplements:      Current Outpatient Medications   Medication     acarbose (PRECOSE) 100 MG tablet     amylase-lipase-protease (CREON) 32412-23711 units CPEP per EC capsule     azelastine (ASTELIN) 0.1 % nasal spray     blood glucose (PAT CONTOUR NEXT) test strip     blood glucose monitoring ([employer] MICROLET) lancets     cetirizine (ZYRTEC) 10 MG tablet     Continuous Blood Gluc Sensor (FREESTYLE LISA 2 SENSOR) MISC     cyclobenzaprine (FLEXERIL) 10 MG tablet     diphenhydrAMINE (BENADRYL ALLERGY) 25 MG capsule     estradiol (VAGIFEM) 10 MCG TABS vaginal tablet     famotidine (PEPCID) 20 MG tablet     glucagon 1 MG kit     glucose 40 % GEL gel     Hydroactive Dressings (TEGADERM HYDROCOLLOID THIN) MISC     HYDROmorphone, STANDARD CONC, (DILAUDID) 1 MG/ML oral solution     ibuprofen (ADVIL/MOTRIN) 400 MG tablet     insulin syringe-needle U-100 (30G X 1/2\" 0.3 ML) 30G X 1/2\" 0.3 ML miscellaneous     levothyroxine (SYNTHROID/LEVOTHROID) 137 MCG tablet     Lidocaine (LIDOCARE) 4 % Patch     methocarbamol (ROBAXIN) 750 MG tablet     montelukast (SINGULAIR) 10 MG tablet     Multiple Vitamin (MULTIVITAMIN ADULT PO)     Nutritional Supplements (BOOST HIGH PROTEIN) LIQD     omeprazole (PRILOSEC) 40 MG DR capsule     order for DME     prochlorperazine (COMPAZINE) 10 MG tablet     promethazine (PHENERGAN) 25 MG tablet     promethazine (PHENERGAN) 25 MG tablet     Prucalopride Succinate 2 MG TABS     scopolamine (TRANSDERM) 1 MG/3DAYS 72 hr patch     scopolamine (TRANSDERM) 1 MG/3DAYS 72 hr patch     Sharps Container (BD SHARPS ) MISC     STATIN NOT " PRESCRIBED (INTENTIONAL)     study - domperidone, IDS# [id number], 10 MG tablet     ZOLMitriptan (ZOMIG) 5 MG tablet     ZOLMitriptan (ZOMIG-ZMT) 5 MG ODT     acarbose (PRECOSE) 50 MG tablet     study - domperidone, IDS# [id number], 10 MG tablet     Current Facility-Administered Medications   Medication     Botulinum Toxin Type A (BOTOX) 200 units injection 155 Units     Food Allergies:  apple    Estimated Nutrition Needs based on ideal body weight of 65 k-1950 calories (25-30 kcals/kg), 65-78g protein (1-1.2g/kg), ~1 ml/kcal or total fluids per MD.     MALNUTRITION:  % Weight Loss:  None noted  % Intake:  <75% for >/= 3 months (moderate malnutrition)  Subcutaneous Fat Loss:  None observed  Muscle Loss:  None observed  Fluid Retention:  None noted    Malnutrition Diagnosis: Patient does not meet two of the above criteria necessary for diagnosing malnutrition  In Context of:  Chronic illness or disease    Nutrition Prescription: enteral nutrition and low fat, low fiber oral diet as tolerated    Tube Feeding recommendations for MD:     Recommend Vital 1.5 formula at goal rate of 45 ml/hr for 24 hours per day.     Recommend slower initiation of TF due to patient typically tolerates slower initiation of TF from past experiences.   Initiate TF at 10 ml per hour and increase by 20 ml every 4-6 hours as tolerated to goal rate of 45 ml/hr.     Once she is tolerating TF at goal rate for at least 24-48 hours, then okay to switch to cycle feed and can go as follows:     Day 1, increase to 60 ml/hr x 18 hours.  If tolerated, then day 2, increased to 75 ml/hr x 14 hours. If tolerated than on day 3 increase to 90 ml x 12 hours overnight (or other 12 hour period of patient's preference). Therefore continue running tube feeding at 90 ml per hour x 12 hours.    Tube feeding provides 1620 calories, 73 g protein, 202 grams CHOs, 62 grams fat and 6 grams fiber and 825 ml free water. This meets ~100% of estimated kcals and 100% of  estimated protein needs.    OR tube feeding recommendations per inpatient dietitian or home infusion dietitian.    Recommend relizorb for enzymes with tube feeding.    If relizorb is not an option, then can do the following for enzymes with TF:    Enzymes with Tube feeding recommendations:  A) Sodium Bicarb tablet (325 mg), 1 tablet q 4 hrs via Jtube. Administration Instructions: Crush 1 tablet and mix into 15 ml of warm water and use this solution to mix with Creon pancreatic enzymes. DO NOT administer directly into Jtube (to be mixed into TF formula with Creon enzyme - see Creon enzyme order below).    B) Creon 24, 1-2 capsules q 4 hrs via Jtube. Administration Instructions:   --When Tube feeding running continuously for 24 hours at 45 ml/hr then recommend Creon 24,000, 1 cap q 4 hours to provide 144,000 units lipase/day. To administer Enzymes with TF:     --When tube feeding running at 60-90 ml/hr, then Creon 24,000, 2 caps every 4 hours to provide 144,000-216,000 units lipase/day. (216,000 unils only temporary when running TF at 60 ml/hr. Otherwise will provide 144,000 units/day with TF).    **Open Creon capsule and empty contents into 15 ml sodium bicarb solution (see sodium bicarb order), let dissolve for about 20-30 minutes and then add this solution to the amount of TF formula hung in TF bag every 4 hrs *Note: this enzyme regimen with TF @ goal cycled infusion of 90 ml x 12 hours, then will provide approx 2,323 units of lipase/gram of total Fat daily and approp dosing initially for pancreatic insufficiency with more elemental TF formula.     Nutrition Intervention      Discussed diet education, review with tips and suggestions for oral diet. Answered patient's questions re: enteral nutrition.    Patient verbalized understanding of education provided. See Goals below.     Goals:    1. Continue trying to 5-6 smaller meals per day as able that are lower in fat and lower in fiber.    2. In the morning, can try  having just a small amount of your protein drink at first such as 2-4 oz to see if you better tolerate just a small amount to start, then can continue to sip on small amounts to finish your protein drink. From there if feeling good then can eat your breakfast.    Nutrition Monitoring and Evaluation: Will monitor adherence to nutrition recommendations at future RD visits.     Further Medical Nutrition Therapy:  Follow-up as needed. Will follow with home infusion RD.    Patient was encouraged to call/contact RD with any further questions.      Quyen Sanz MS, RD, LD

## 2022-11-10 NOTE — PATIENT INSTRUCTIONS
It was nice speaking with you today. Below are the nutrition recommendations we discussed at your visit.    Please let me know if you have any additional questions.    Nutrition Recommendations    1. Continue trying to 5-6 smaller meals per day as able that are lower in fat and lower in fiber.    2. In the morning, can try having just a small amount of your protein drink at first such as 2-4 oz to see if you better tolerate just a small amount to start, then can continue to sip on small amounts to finish your protein drink. From there if feeling good then can eat your breakfast.    Can follow up with me as needed.     If you would like to schedule a follow up appointment, please call [phone].    Thank you,    Quyen Sanz, MS, RD, LD

## 2022-11-10 NOTE — PROGRESS NOTES
This is a recent snapshot of the patient's [facility] medical record.  For current drug dose and complete information and questions, call [phone] or In Basket pool, [facility]v home infusion ([id number])  CSN Number:  [id number]

## 2022-11-11 ENCOUNTER — VIRTUAL VISIT (OUTPATIENT)
Dept: PSYCHOLOGY | Facility: CLINIC | Age: 57
End: 2022-11-11
Payer: COMMERCIAL

## 2022-11-11 DIAGNOSIS — F43.23 ADJUSTMENT DISORDER WITH MIXED ANXIETY AND DEPRESSED MOOD: Primary | ICD-10-CM

## 2022-11-11 PROCEDURE — 90837 PSYTX W PT 60 MINUTES: CPT | Mod: 95 | Performed by: STUDENT IN AN ORGANIZED HEALTH CARE EDUCATION/TRAINING PROGRAM

## 2022-11-11 NOTE — PROGRESS NOTES
"  Health Psychology                                                                                                                          Sweta Michelle, Ph.D., L.P [phone]  Melva Beebe, Ph.D., L.P. [phone]  Rachel Sloan, Ph.D, L..P. [phone]  Kenya Sanchez, Ph.D., L.P. [phone]  Estrada Patel, Ph.D., A.B.P.P., L.P. [phone]         Enedina Joaquin, Ph.D., L.P. [phone]       Leesa Porras, Ph.D., A.B.P.P., LP [phone]           [facility], 3rd Floor  [address] [place] [address]     Health Psychology Follow-up Visit - Telehealth Visit     Dinora Mcghee is a 57 year old female who is being evaluated via a billable video visit.       The patient has been notified of following:      \"This video visit will be conducted via a video visit between you and your physician/provider. We have found that certain health care needs can be provided without the need for an in-person physical exam.  This service lets us provide the care you need with a video conversation.  If a prescription is necessary we can send it directly to your pharmacy.  If lab work is needed we can place an order for that and you can then stop by our lab to have the test done at a later time.     Video visits are billed at different rates depending on your insurance coverage.  Please reach out to your insurance provider with any questions.     If during the course of the call the physician/provider feels a video visit is not appropriate, you will not be charged for this service.\"     Patient has given verbal consent for Video visit? Yes  Will anyone else be joining your video visit? No    Date of Service:  11/11/22    SUBJECTIVE:  Dinora Mcghee was seen for individual psychotherapy. Reviewed emotional and physical health since our last session. Dinora had reached out for support in coping with anxiety related to upcoming surgery.    Symptoms reported: Feeling " exhausted, reduced interest, at least three nights/week not sleeping, anxiety higher - feels like an elephant on her chest. Has read up on tube they'll use. Twitches she can't shake. So disappointed that her body is doing this still. Feels like electricity running through body and twitching. Anxiety most prominent at night, wakes up a lot due to pain and otherwise. More isolating, feeling more anxious with leaving the house and preparing a lot. Dinora shared that she has always had difficulty getting to sleep, light sleeper, once she wakes up she's up     Progress towards goals: Every other day feeding right now, able to maintain weight. Phenergran works well to help with pain with eating. Working 16-20 hours/week. Continues to use adaptive emotion-focused coping skills.    Interventions utilized: Reviewed experiences in interim and how symptoms have progressed since February. There were some positives (I.e. port placed so she could get fluids for travel), most number of good days in a row was 5, most recently several weeks ago (right after starting meds). Trigger point injections. This time of year greater risk for migraines. Using a new GJ tube this time - type of tube is more pliable. Discussed how anxiety presents and what is associated with this. Anxiety related to existential factors - what if it this is it?, this is how my life is. Fear of pain. Validated Dinora's emotional experiences and offered ideas of how to tolerate them in the short-term before surgery. She wondered about medications that could be useful for anxiety or sleep. Provided psychoeducation about some options. She decided she would message PCP to ask for something. Made plans to touch base after procedure and assess adjustment and re-assess mental health needs.    Ms. Mcghee was engaged throughout the visit and expressed understanding of information provided.    OBJECTIVE:  Ms. Mcghee was available for scheduled visit.  She reported anxious  mood. Affect was mood- and thought-congruent. Insight and judgment good.  Thought processes logical and linear. Speech WNL.  Denied suicidal ideation.     ASSESSMENT:  Psychological Assessment:  The PHQ-9 is an instrument for screening, diagnosing, monitoring and measuring the severity of depression. Scores of 5, 10, 15, and 20 represent cutpoints for mild, moderate, moderately severe and severe depression, respectively.   PHQ-9 SCORE 7/1/2019 6/21/2021 7/8/2021   PHQ-9 Total Score MyChart 3 (Minimal depression) 13 (Moderate depression) 13 (Moderate depression)   PHQ-9 Total Score 3 13 13       The DARCY-7 is an instrument for screening, diagnosing, monitoring and measuring the severity of anxiety. Scores of 5, 10, and 15 represent cutpoints for mild, moderate, and severe anxiety, respectively.  DARCY-7 SCORE 1/22/2018 6/21/2021 10/11/2022   Total Score - 0 (minimal anxiety) 5 (mild anxiety)   Total Score 2 0 5     Therapy appropriate for Dinora at this time - feeling more anxiety associated with health issues, pain, nausea.     DIAGNOSIS:  Adjustment disorder with mixed anxiety and depressed mood.     PLAN:  Follow-up in a few weeks after procedure.     Type of service: Telemedicine Psychotherapy for coping with health conditions and symptoms of depression and anxiety  Time of service:   ? Date: 11/11/22  ? Time Service Began: 11:03  ? Time Service Ended: 11:58  Reason that telemedicine is appropriate: Public health regulations due to COVID-19 pandemic/state of emergency  Mode of transmission: Secure real time interactive audio and visual telecommunication system via [email or web address]  Location of originating and distant sites:  ? Originating site (patient location): Patient's home  ? Distant site (provider site): Provider's office    Rachel Sloan, PhD, LP  Clinical Health Psychologist    Tx plan completed: 7/8/21  Tx plan due:  7/8/22 - will update if we decide to continue to meet.

## 2022-11-15 ENCOUNTER — HOSPITAL ENCOUNTER (OUTPATIENT)
Facility: AMBULATORY SURGERY CENTER | Age: 57
Discharge: HOME | End: 2022-11-15
Attending: ANESTHESIOLOGY | Admitting: ANESTHESIOLOGY
Payer: COMMERCIAL

## 2022-11-15 ENCOUNTER — ANCILLARY PROCEDURE (OUTPATIENT)
Dept: RADIOLOGY | Facility: AMBULATORY SURGERY CENTER | Age: 57
End: 2022-11-15
Attending: ANESTHESIOLOGY
Payer: COMMERCIAL

## 2022-11-15 VITALS
HEIGHT: 68 IN | RESPIRATION RATE: 16 BRPM | WEIGHT: 141 LBS | TEMPERATURE: 98.6 F | BODY MASS INDEX: 21.37 KG/M2 | OXYGEN SATURATION: 98 % | DIASTOLIC BLOOD PRESSURE: 88 MMHG | HEART RATE: 87 BPM | SYSTOLIC BLOOD PRESSURE: 130 MMHG

## 2022-11-15 DIAGNOSIS — R52 PAIN: ICD-10-CM

## 2022-11-15 DIAGNOSIS — M79.18 MYOFASCIAL PAIN: ICD-10-CM

## 2022-11-15 PROCEDURE — 76942 ECHO GUIDE FOR BIOPSY: CPT | Mod: 26 | Performed by: ANESTHESIOLOGY

## 2022-11-15 PROCEDURE — 20552 NJX 1/MLT TRIGGER POINT 1/2: CPT

## 2022-11-15 PROCEDURE — 20552 NJX 1/MLT TRIGGER POINT 1/2: CPT | Performed by: ANESTHESIOLOGY

## 2022-11-15 RX ORDER — LIDOCAINE HYDROCHLORIDE 10 MG/ML
INJECTION, SOLUTION EPIDURAL; INFILTRATION; INTRACAUDAL; PERINEURAL DAILY PRN
Status: DISCONTINUED | OUTPATIENT
Start: 2022-11-15 | End: 2022-11-15 | Stop reason: HOSPADM

## 2022-11-15 RX ORDER — BUPIVACAINE HYDROCHLORIDE 2.5 MG/ML
INJECTION, SOLUTION INFILTRATION; PERINEURAL DAILY PRN
Status: DISCONTINUED | OUTPATIENT
Start: 2022-11-15 | End: 2022-11-15 | Stop reason: HOSPADM

## 2022-11-15 RX ORDER — METHOCARBAMOL 750 MG/1
750 TABLET, FILM COATED ORAL
COMMUNITY
Start: 2022-07-11 | End: 2022-12-19

## 2022-11-15 NOTE — OP NOTE
PROCEDURE REPORT     Chief Complaint:  Chief complaint of: abdominal pain     Pre-Operative Diagnosis:  1. Right upper quadrant abdominal pain  2. Myalgia, unspecified site     Post-Operative Diagnosis:  1. Right upper quadrant abdominal pain  2. Myalgia, unspecified site     Procedure:  Right abdominal trigger point injection  Ultrasound for needle guidance      Indications / Pre-Operative Plan:  The patient has disabling myofascial pain, therefore, a trigger point injection will be performed. The risks, alternatives and benefits were explained to the patient in detail. The patient agreed with the plan.   Patient was examined by me prior to the procedure. Examination of heart, lung and mental status were all within acceptable limits. The patient has been assessed, examined and cleared for the planned procedure and level of anesthesia in an ambulatory surgery center.        Ultrasound guidance: The use of direct ultrasound visualization of the needle was required (rather than a non-guided injection) to ensure accurate injection delivery and to maximize clinical benefit beyond that obtained with a non-guided injection. Additionally, there can be diagnostic specificity when evaluating effectiveness of the injection, and for safety purposes to minimize risk of bleeding or injury to surrounding structures. Images have been archived in the patients chart.  Ultrasound probe (MHz): linear high-frequency  Needle visualization: in-plane  Needle approach: lateral to medial     Description of Procedure:  After informed consent, the patient was placed in the supine position. A duplex ultrasound examination was performed with a 15Hz transducer. I marked trigger points in the abdomen and prepped and draped in the usual sterile fashion. The local skin anesthetic lidocaine 1% was used for this procedure. I then scanned the trigger point(s) individually and used a 22g, 3.5 in. needle under live ultrasound guidance. I injected a  total of 1 muscle groups. I injected a total dose 5 mL bupivacaine 0.25%  the trigger points injected. All needles were placed easily, with minimal trauma. The patient did not experience any side effects after the procedure and was discharged home after monitoring.     Post-Operative Plan:  Follow up in clinic in 3-4 weeks.

## 2022-11-15 NOTE — H&P
Dinora GAINES [facility]  [id number]  female  57 year old      Reason for procedure/surgery: myofascial pain    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     Incisional hernia, without obstruction or gangrene     Adhesive capsulitis of shoulder, unspecified laterality     S/P hernia repair     Headache, chronic migraine without aura     Chronic pain syndrome     Iron deficiency anemia     Hypoglycemia     Adult failure to thrive     Abdominal pain, generalized     Gastrostomy tube obstruction (H)     Intra-abdominal abscess (H)     Postprocedural intraabdominal abscess     Acquired absence of spleen     Depressive disorder     Diabetes insipidus (H)     Other specified abnormal immunological findings in serum     Poisoning by drug     Sphincter of Oddi dysfunction     Type 1 diabetes mellitus without complication (H)     Myofascial pain       Past Surgical History:    Past Surgical History:   Procedure Laterality Date     ABDOMEN SURGERY      c sections: 93, 96, 98. endometriosis growth     APPENDECTOMY        SECTION       COLONOSCOPY       ENDOSCOPIC INSERTION TUBE GASTROSTOMY N/A 2021    Procedure: Gastrojejonostomy placement with jejunopexy, PEG tube exchange;  Surgeon: Zackery Montoya MD;  [facility]     ENDOSCOPIC RETROGRADE CHOLANGIOPANCREATOGRAM N/A 2015    Procedure: ENDOSCOPIC RETROGRADE  CHOLANGIOPANCREATOGRAM;  Surgeon: Mandeep Park MD;  [facility]     ENDOSCOPIC RETROGRADE CHOLANGIOPANCREATOGRAM N/A 2/9/2016    Procedure: COMBINED ENDOSCOPIC RETROGRADE CHOLANGIOPANCREATOGRAPHY, PLACE TUBE/STENT;  Surgeon: Mandeep Park MD;  [facility]     ENDOSCOPIC RETROGRADE CHOLANGIOPANCREATOGRAM N/A 3/17/2016    Procedure: COMBINED ENDOSCOPIC RETROGRADE CHOLANGIOPANCREATOGRAPHY, REMOVE FOREIGN BODY OR STENT/TUBE;  Surgeon: Mandeep Park MD;  [facility]     ENDOSCOPIC RETROGRADE CHOLANGIOPANCREATOGRAM N/A 8/2/2016    Procedure: COMBINED ENDOSCOPIC RETROGRADE CHOLANGIOPANCREATOGRAPHY, PLACE TUBE/STENT;  Surgeon: Mandeep Park MD;  [facility]     ENDOSCOPIC RETROGRADE CHOLANGIOPANCREATOGRAM N/A 8/26/2016    Procedure: COMBINED ENDOSCOPIC RETROGRADE CHOLANGIOPANCREATOGRAPHY, REMOVE FOREIGN BODY OR STENT/TUBE;  Surgeon: Mandeep Park MD;  [facility]     ENDOSCOPIC ULTRASOUND UPPER GASTROINTESTINAL TRACT (GI) N/A 10/3/2016    Procedure: ENDOSCOPIC ULTRASOUND, ESOPHAGOSCOPY / UPPER GASTROINTESTINAL TRACT (GI);  Surgeon: Guru Jose Rodas MD;  [facility]     ENTEROSCOPY SMALL BOWEL N/A 2/3/2022    Procedure: ENTEROSCOPY with possible fistula closure;  Surgeon: Francisco Dodson MD;  Location:  GI     ESOPHAGOSCOPY, GASTROSCOPY, DUODENOSCOPY (EGD), COMBINED N/A 6/24/2015    Procedure: COMBINED ESOPHAGOSCOPY, GASTROSCOPY, DUODENOSCOPY (EGD), REMOVE FOREIGN BODY;  Surgeon: Mandeep Park MD;  Location:  GI     ESOPHAGOSCOPY, GASTROSCOPY, DUODENOSCOPY (EGD), COMBINED N/A 10/25/2015    Procedure: COMBINED ESOPHAGOSCOPY, GASTROSCOPY, DUODENOSCOPY (EGD);  Surgeon: Sammy Amaro MD;  Location:  GI     ESOPHAGOSCOPY, GASTROSCOPY, DUODENOSCOPY (EGD), COMBINED N/A 10/25/2015    Procedure: COMBINED ESOPHAGOSCOPY, GASTROSCOPY, DUODENOSCOPY (EGD), BIOPSY SINGLE OR MULTIPLE;  Surgeon: Sammy Amaro MD;  [facility]     ESOPHAGOSCOPY, GASTROSCOPY, DUODENOSCOPY (EGD), DILATATION, COMBINED       EXCISE LESION TRUNK N/A 4/17/2017    Procedure: EXCISE LESION TRUNK;  Removal of Abdominal Foreign Body;  Surgeon: Nestor Phoenix MD;  [facility]     HC ESOPH/GAS REFLUX TEST W NASAL IMPED >1 HR N/A 11/19/2015    Procedure: ESOPHAGEAL IMPEDENCE FUNCTION TEST WITH 24 HOUR PH GREATER THAN 1 HOUR;  Surgeon: Thiago Apple MD;  [facility]     HC UGI ENDOSCOPY DIAG W BIOPSY  9/17/08     HC UGI ENDOSCOPY DIAG W BIOPSY  9/27/12     HC UGI ENDOSCOPY W ESOPHAGEAL DILATION BALLOON <30MM  9/17/08     HC UGI ENDOSCOPY W EUS N/A 5/5/2015    Procedure: COMBINED ENDOSCOPIC ULTRASOUND, ESOPHAGOSCOPY, GASTROSCOPY, DUODENOSCOPY (EGD);  Surgeon: Wm Dueñas MD;  [facility]     HC WRIST ARTHROSCOP,RELEASE XVERS LIG Bilateral 12/17/08     INJECT TRANSVERSUS ABDOMINIS PLANE (TAP) BLOCK BILATERAL Left 9/22/2016    Procedure: INJECT TRANSVERSUS ABDOMINIS PLANE (TAP) BLOCK BILATERAL;  Surgeon: Dickson Corrigan MD;  [facility]     INJECT TRIGGER POINT Bilateral 9/8/2022    Procedure: Abdominal trigger point injection with ultrasound;  Surgeon: Monika Mahajan MD;  [facility]: [facility]     IR CHEST PORT PLACEMENT < 5 YRS OF AGE  6/10/2022     laparoscopic pineda  1995     LAPAROSCOPIC HERNIORRHAPHY INCISIONAL N/A 8/23/2018    Procedure: LAPAROSCOPIC HERNIORRHAPHY INCISIONAL;  Laparoscopic Incisional Hernia Repair with Symbotex Mesh Implant;  Surgeon: Nestor Phoenix MD;  [facility]     LAPAROSCOPIC PANCREATECTOMY, TRANSPLANT AUTO ISLET CELL N/A 12/28/2016    Procedure: LAPAROSCOPIC PANCREATECTOMY, TRANSPLANT AUTO ISLET CELL;  Surgeon: Nestor Phoenix MD;  [facility]     REMOVE GASTROSTOMY TUBE ADULT N/A 1/28/2022    Procedure: REMOVAL, GASTROSTOMY TUBE, ADULT;  Surgeon: Mandeep Park MD;  [facility]     REPLACE GASTROJEJUNOSTOMY TUBE, PERCUTANEOUS N/A 9/7/2021    Procedure: GASTROJEJUNOSTOMY TUBE  PLACEMENT, PERCUTANEOUS, WITH GASTROPEXY;  Surgeon: Mandeep Park MD;  [facility]     REPLACE GASTROJEJUNOSTOMY TUBE, PERCUTANEOUS N/A 2021    Procedure: REPLACEMENT, GASTROJEJUNOSTOMY TUBE, PERCUTANEOUS;  Surgeon: Zackery Montoya MD;  [facility]     REPLACE JEJUNOSTOMY TUBE, PERCUTANEOUS N/A 9/10/2021    Procedure: UPPER ENDOSCOPY, REPLACEMENT OF PERCUTANEOUS GASTROJEJUNOSTOMY TUBE, T-TAG GASTROPEXY;  Surgeon: Zackery Montoya MD;  [facility]     REPLACE JEJUNOSTOMY TUBE, PERCUTANEOUS N/A 2021    Procedure: REPLACEMENT, JEJUNOSTOMY TUBE, PERCUTANEOUS;  Surgeon: Mandeep Park MD;  [facility]     transphenoidal pituitary resection       [facility]  DELIVERY ONLY       [facility]  DELIVERY ONLY      repeat c section with incidental cystotomy with repair     [facility] EXCIS PITUITARY,TRANSNASAL/SEPTAL      pituitary tumor removed for diabetes insipidus     [facility] TOTAL ABDOM HYSTERECTOMY      w/ bilateral salpingoophorectomy        Past Medical History:   Past Medical History:   Diagnosis Date     Allergic rhinitis, cause unspecified      Allergy to other foods     strawberries, apples, celeries, alice, watermelon     Arthritis     left knee     Choledocholithiasis     long after cholecystectomy     Chronic abdominal pain      Chronic constipation      Chronic nausea      Chronic pancreatitis (H)      Degeneration of lumbar or lumbosacral intervertebral disc      Esophageal reflux     w/ hiatal hernia     Gastroparesis      Hiatal hernia      History of pituitary adenoma     s/p resection     Hypothyroidism      Migraines      Mild hyperlipidemia      On tube feeding diet     presence of GJ tube     Pancreatic disease     PD stricture, 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      PONV (postoperative nausea and vomiting)      Portacath in place      Type 1 diabetes mellitus without complication (H) 2022     Unspecified hearing loss     25%  high frequency R       Social History:   Social History     Tobacco Use     Smoking status: Former     Packs/day: 0.50     Years: 6.00     Pack years: 3.00     Types: Cigarettes     Start date: 1985     Quit date: 1992     Years since quittin.8     Smokeless tobacco: Never     Tobacco comments:     no 2nd hand   Substance Use Topics     Alcohol use: Not Currently     Alcohol/week: 3.0 - 6.0 standard drinks     Types: 1 - 2 Glasses of wine, 1 - 2 Cans of beer, 1 - 2 Shots of liquor per week     Comment: none since IGG       Family History:   Family History   Problem Relation Age of Onset     Lipids Mother      Hypertension Mother      Thyroid Disease Mother      Depression Mother      Angina Mother      GERD Mother      Skin Cancer Mother      Migraines Mother      Eye Disorder Father         cataract, detached retina     Myocardial Infarction Father 60     Lipids Father      Cerebrovascular Disease Father      Depression Father      Substance Abuse Father      Anesthesia Reaction Father         stroke right after surgery     Cataracts Father      Osteoarthritis Father      Ulcerative Colitis Father      Interpersonal Violence Sister      Coronary Artery Disease Sister         angioplasty     GERD Sister      Substance Abuse Sister      Depression Sister      Thyroid Disease Sister      Eye Disorder Maternal Grandmother         cataract     Thyroid Disease Maternal Grandmother      Diabetes Maternal Grandfather      Eye Disorder Paternal Grandmother         cataract     Eye Disorder Paternal Grandfather         cataract     Diabetes Paternal Grandfather      Eye Disorder Son         ptosis     Depression Son      Anxiety Disorder Son      Heart Disease Paternal Aunt      Diabetes Paternal Aunt      Diabetes Paternal Uncle      Heart Disease Paternal Uncle      Depression Nephew      Anxiety Disorder Nephew      Thyroid Disease Nephew      Diabetes Type 2  Cousin         paternal cousin       Allergies:    Allergies   Allergen Reactions     Apple Anaphylaxis     Corticosteroids Other (See Comments)     All oral, IV and injectable steroids are contraindicated (unless in life threatening situations) in [facility] transplant recipients. They can cause irreversible loss of islet cell function. Please contact patient's transplant care coordinator ADI Gaffney RN at [phone]/pager [phone] and/or endocrinologist prior to administration.       Depakote [Divalproex Sodium] Other (See Comments)     Chest pain     Zithromax [Azithromycin Dihydrate] Anaphylaxis     Noted in 4/7/08 ER     Bromfenac      Other reaction(s): Headache     Codeine      Other reaction(s): Hallucinations     Darvocet [Propoxyphene N-Apap] Itching     Morphine Nausea and Vomiting and Rash     Nalbuphine Hcl Rash     RASH :nubaine     Zosyn [Piperacillin-Tazobactam In D5w] Rash     Possible allergy, did have a diffuse rash that seemed drug related but could have also been related to soap in the hospital.      Bactrim [Sulfamethoxazole W-Trimethoprim] Other (See Comments) and Nausea and Vomiting     Severely low liver function.     Tape [Adhesive Tape] Blisters     Tramadol      Zofran [Ondansetron] Other (See Comments)     migraine     Flagyl [Metronidazole] Hives and Rash       Active Medications:   Current Outpatient Medications   Medication Sig Dispense Refill     acarbose (PRECOSE) 100 MG tablet Take 1 tablet (100 mg) by mouth 3 times daily (with meals) 90 tablet 3     amylase-lipase-protease (CREON) 63661-62824 units CPEP per EC capsule Take 3-4 capsules by mouth 3 times daily (with meals) With oral meals 150 capsule 11     cetirizine (ZYRTEC) 10 MG tablet Take 1 tablet (10 mg) by mouth daily 90 tablet 3     cyclobenzaprine (FLEXERIL) 10 MG tablet Take 1 tablet (10 mg) by mouth 2 times daily as needed for muscle spasms 90 tablet 3     diphenhydrAMINE (BENADRYL ALLERGY) 25 MG capsule Take 1 capsule (25 mg) by mouth every 6 hours as needed  for itching or allergies 56 capsule 0     estradiol (VAGIFEM) 10 MCG TABS vaginal tablet INSERT 1 TABLET(10 MCG) VAGINALLY 2 TIMES A WEEK 24 tablet 2     famotidine (PEPCID) 20 MG tablet Take 1 tablet (20 mg) by mouth daily 90 tablet 3     glucagon 1 MG kit Give 0.1 to 0.15mg( 10-15 units on Insulin sryinge) subcutaneous  every 15 minutes PRN for hypoglycemia. Remix new kit q24hr. Needs up to 3 kit/week. 10 each 3     HYDROmorphone, STANDARD CONC, (DILAUDID) 1 MG/ML oral solution Take 1-2 mLs (1-2 mg) by mouth every 4 hours as needed for moderate to severe pain (Post G and J tube placement) 168 mL 0     ibuprofen (ADVIL/MOTRIN) 400 MG tablet Take 1 tablet (400 mg) by mouth every 6 hours as needed for moderate pain 30 tablet 0     levothyroxine (SYNTHROID/LEVOTHROID) 137 MCG tablet Take 1 tablet (137 mcg) by mouth daily 90 tablet 3     Lidocaine (LIDOCARE) 4 % Patch Place 1 patch onto the skin every 24 hours To prevent lidocaine toxicity, patient should be patch free for 12 hrs daily. 10 patch 0     methocarbamol (ROBAXIN) 750 MG tablet Take 750 mg by mouth       Multiple Vitamin (MULTIVITAMIN ADULT PO) Take 1 tablet by mouth daily       omeprazole (PRILOSEC) 40 MG DR capsule Take 1 capsule (40 mg) by mouth daily 90 capsule 3     prochlorperazine (COMPAZINE) 10 MG tablet TAKE 1/2 TABLET(5 MG) BY MOUTH EVERY 6 HOURS AS NEEDED FOR NAUSEA OR VOMITING 60 tablet 2     promethazine (PHENERGAN) 25 MG tablet Take 1 tablet (25 mg) by mouth daily as needed (take as needed for nausea/vomiting) 60 tablet 0     Prucalopride Succinate 2 MG TABS Take 1 tablet (2 mg) by mouth daily 30 tablet 11     scopolamine (TRANSDERM) 1 MG/3DAYS 72 hr patch Place 1 patch onto the skin every 72 hours 10 patch 11     ZOLMitriptan (ZOMIG) 5 MG tablet TAKE 1 TABLET BY MOUTH DAILY AS NEEDED FOR MIGRAINE. ONLY TAKE 2 TABLETS IN A 24 HR PERIOD. MAY USE 1 TO 2 TIMES PER WEEK       acarbose (PRECOSE) 50 MG tablet Take 1 tablet (50 mg) by mouth 3 times  "daily (with meals) Increase to 100 mg three times daily as needed. (Patient not taking: Reported on 11/2/2022) 180 tablet 5     azelastine (ASTELIN) 0.1 % nasal spray Spray 1 spray into both nostrils 2 times daily 1 Bottle 11     blood glucose (PAT CONTOUR NEXT) test strip Use to test blood sugar 6 times daily or as directed. 2 Box 11     blood glucose monitoring (PAT MICROLET) lancets Use to test blood sugar 6-8 times daily or as directed. 100 each 11     Continuous Blood Gluc Sensor (FREESTYLE LISA 2 SENSOR) MISC Inject 1 patch into the skin every 14 days 2 each 11     glucose 40 % GEL gel Take 15-30 g by mouth every 15 minutes as needed for low blood sugar 3 Tube 2     Hydroactive Dressings (TEGADERM HYDROCOLLOID THIN) MISC Use under sensor site , change every 14 days 2 each 11     insulin syringe-needle U-100 (30G X 1/2\" 0.3 ML) 30G X 1/2\" 0.3 ML miscellaneous Use 3 syringes daily or as directed. 50 each 1     montelukast (SINGULAIR) 10 MG tablet TAKE 1 TABLET(10 MG) BY MOUTH AT BEDTIME (Patient not taking: Reported on 11/15/2022) 90 tablet 3     Nutritional Supplements (BOOST HIGH PROTEIN) LIQD After above baseline labs are drawn, give: 6 mL/kg to maximum of 360 mL; the beverage is to be consumed within 5 minutes.  0     order for DME Equipment being ordered:Cefaly 1 Device 0     promethazine (PHENERGAN) 25 MG tablet Take 25 mg by mouth       scopolamine (TRANSDERM) 1 MG/3DAYS 72 hr patch Place 1 patch onto the skin every 72 hours 10 patch 11     Sharps Container (BD SHARPS ) MISC 1 Container as needed 1 each 1     STATIN NOT PRESCRIBED (INTENTIONAL) Previous liver issues, risks vs benefits felt to not warrant statin.    Discussed Oct 2022 visit       study - domperidone, IDS# [id number], 10 MG tablet Take 2 tablets (20 mg) by mouth 4 times daily 240 tablet 1     ZOLMitriptan (ZOMIG-ZMT) 5 MG ODT Take 1 tablet (5 mg) by mouth at onset of headache for migraine May repeat in 2 hours. Max 2 tablets/24 " "hours. 18 tablet 6       Systemic Review:   CONSTITUTIONAL: NEGATIVE for fever, chills, change in weight  ENT/MOUTH: NEGATIVE for ear, mouth and throat problems  RESP: NEGATIVE for significant cough or SOB  CV: NEGATIVE for chest pain, palpitations or peripheral edema    Physical Examination:   Vital Signs: /77 (BP Location: Right arm)   Pulse 76   Temp 98.6  F (37  C) (Temporal)   Resp 16   Ht 1.715 m (5' 7.5\")   Wt 64 kg (141 lb)   SpO2 100%   BMI 21.76 kg/m    GENERAL: healthy, alert and no distress  NECK: no adenopathy, no asymmetry, masses, or scars  RESP: lungs clear to auscultation - no rales, rhonchi or wheezes  CV: regular rate and rhythm, normal S1 S2, no S3 or S4, no murmur, click or rub, no peripheral edema and peripheral pulses strong  ABDOMEN: soft, nontender, no hepatosplenomegaly, no masses and bowel sounds normal  MS: no gross musculoskeletal defects noted, no edema    Plan: Appropriate to proceed as scheduled.      Monika Mahajan MD  11/15/2022          "

## 2022-11-15 NOTE — DISCHARGE INSTRUCTIONS
PAIN INJECTION HOME CARE INSTRUCTIONS  Activity  -You may resume most normal activity levels with the exception of strenuous activity. It may help to move in ways that hurt before the injection, to see if the pain is still there, but do not overdo it.     -DO NOT remove bandaid for 24 hours  -DO NOT shower for 24 hours      Pain  -You may feel immediate pain relief and numbness for a period of time after the injection. This may indicate the medication has reached the right spot.  -Your pain may return after this short pain-free period, or may even be a little worse for a day or two. It may be caused by needle irritation or by the medication itself. The medications usually take two or three days to start working, but can take as long as a week.    -You may use an ice pack for 20 minutes every 2 hours for the first 24 hours  -You may use a heating pad after the first 24 hours  -You may use Tylenol (acetaminophen) every 4 hours or other pain medicines as directed by your physician      DID YOU RECEIVE STEROIDS TODAY?    Common side effects of steroids:  Not everyone will experience corticosteroid side effects. If side effects are experienced, they will gradually subside in the 7-10 day period following an injection. Most common side effects include:  -Flushed face and/or chest  -Feeling of warmth, particularly in the face but could be an overall feeling of warmth  -Increased blood sugar in diabetic patients  -Menstrual irregularities my occur. If taking hormone-based birth control an alternate method of birth control is recommended  -Sleep disturbances and/or mood swings are possible  -Leg cramps    PLEASE KEEP TRACK OF YOUR SYMPTOMS AND NOTE ANY CHANGES FOR YOUR DOCTOR.       Please contact us if you have:  -Severe pain  -Fever more than 101.5 degrees Fahrenheit  -Signs of infection at the injection site (redness, swelling, or drainage)      FOR PAIN CENTER PATIENTS:  If you have questions, please contact the Pain  Clinic at [phone] Option 1 between the hours of 7:00 am and 3:00 pm Monday through Friday. After office hours you can contact the on call provider by dialing [phone]. If you need immediate attention, we recommend that you go to a hospital emergency room or dial [phone].      FOR PM&R PATIENTS:  For patients seen by the PM and R service, please call [phone]. (Monday through Friday 8a-5p.  After business hours and weekends call [phone] and ask for the PM and R doctor on call). If you need to fax a pain diary to PM&R the fax number is [phone]. If you are unable to fax, uploading to [employer] is encouraged, then send to provider. If you have procedure scheduling questions please call [phone] Option #2.

## 2022-11-16 ENCOUNTER — PATIENT OUTREACH (OUTPATIENT)
Dept: GASTROENTEROLOGY | Facility: CLINIC | Age: 57
End: 2022-11-16

## 2022-11-16 NOTE — TELEPHONE ENCOUNTER
Called pt to discuss moving procedure to 11/22 so necessary tube is in stock (they were shipped today) Pt ok with moving case to 11/22.   Message sent to Dr Melissa regarding dosing of acarbose (PRECOSE) 100 MG tablet related to tube feeds starting next week    ML   O-Z Plasty Text: The defect edges were debeveled with a #15 scalpel blade.  Given the location of the defect, shape of the defect and the proximity to free margins an O-Z plasty (double transposition flap) was deemed most appropriate.  Using a sterile surgical marker, the appropriate transposition flaps were drawn incorporating the defect and placing the expected incisions within the relaxed skin tension lines where possible.    The area thus outlined was incised deep to adipose tissue with a #15 scalpel blade.  The skin margins were undermined to an appropriate distance in all directions utilizing iris scissors.  Hemostasis was achieved with electrocautery.  The flaps were then transposed into place, one clockwise and the other counterclockwise, and anchored with interrupted buried subcutaneous sutures.

## 2022-11-20 ENCOUNTER — HEALTH MAINTENANCE LETTER (OUTPATIENT)
Age: 57
End: 2022-11-20

## 2022-11-22 ENCOUNTER — HOME INFUSION (PRE-WILLOW HOME INFUSION) (OUTPATIENT)
Dept: PHARMACY | Facility: CLINIC | Age: 57
End: 2022-11-22

## 2022-11-22 ENCOUNTER — HOSPITAL ENCOUNTER (OUTPATIENT)
Facility: CLINIC | Age: 57
Discharge: HOME | End: 2022-11-22
Attending: INTERNAL MEDICINE | Admitting: INTERNAL MEDICINE
Payer: COMMERCIAL

## 2022-11-22 ENCOUNTER — APPOINTMENT (OUTPATIENT)
Dept: GENERAL RADIOLOGY | Facility: CLINIC | Age: 57
End: 2022-11-22
Attending: INTERNAL MEDICINE
Payer: COMMERCIAL

## 2022-11-22 ENCOUNTER — ANESTHESIA EVENT (OUTPATIENT)
Dept: SURGERY | Facility: CLINIC | Age: 57
End: 2022-11-22
Payer: COMMERCIAL

## 2022-11-22 ENCOUNTER — ANESTHESIA (OUTPATIENT)
Dept: SURGERY | Facility: CLINIC | Age: 57
End: 2022-11-22
Payer: COMMERCIAL

## 2022-11-22 VITALS
OXYGEN SATURATION: 96 % | RESPIRATION RATE: 18 BRPM | BODY MASS INDEX: 22.32 KG/M2 | SYSTOLIC BLOOD PRESSURE: 108 MMHG | WEIGHT: 142.2 LBS | TEMPERATURE: 98.1 F | DIASTOLIC BLOOD PRESSURE: 62 MMHG | HEART RATE: 78 BPM | HEIGHT: 67 IN

## 2022-11-22 DIAGNOSIS — Z46.59 ENCOUNTER FOR CARE RELATED TO FEEDING TUBE: Primary | ICD-10-CM

## 2022-11-22 LAB
GLUCOSE BLDC GLUCOMTR-MCNC: 80 MG/DL (ref 70–99)
GLUCOSE BLDC GLUCOMTR-MCNC: 84 MG/DL (ref 70–99)

## 2022-11-22 PROCEDURE — 250N000011 HC RX IP 250 OP 636: Performed by: ANESTHESIOLOGY

## 2022-11-22 PROCEDURE — 370N000017 HC ANESTHESIA TECHNICAL FEE, PER MIN: Performed by: INTERNAL MEDICINE

## 2022-11-22 PROCEDURE — 258N000003 HC RX IP 258 OP 636: Performed by: NURSE ANESTHETIST, CERTIFIED REGISTERED

## 2022-11-22 PROCEDURE — 999N000179 XR SURGERY CARM FLUORO LESS THAN 5 MIN W STILLS: Mod: TC

## 2022-11-22 PROCEDURE — 82962 GLUCOSE BLOOD TEST: CPT

## 2022-11-22 PROCEDURE — 999N000141 HC STATISTIC PRE-PROCEDURE NURSING ASSESSMENT: Performed by: INTERNAL MEDICINE

## 2022-11-22 PROCEDURE — 272N000001 HC OR GENERAL SUPPLY STERILE: Performed by: INTERNAL MEDICINE

## 2022-11-22 PROCEDURE — 710N000010 HC RECOVERY PHASE 1, LEVEL 2, PER MIN: Performed by: INTERNAL MEDICINE

## 2022-11-22 PROCEDURE — 255N000002 HC RX 255 OP 636: Performed by: INTERNAL MEDICINE

## 2022-11-22 PROCEDURE — 250N000011 HC RX IP 250 OP 636: Performed by: INTERNAL MEDICINE

## 2022-11-22 PROCEDURE — 710N000012 HC RECOVERY PHASE 2, PER MINUTE: Performed by: INTERNAL MEDICINE

## 2022-11-22 PROCEDURE — 250N000011 HC RX IP 250 OP 636: Performed by: NURSE ANESTHETIST, CERTIFIED REGISTERED

## 2022-11-22 PROCEDURE — 360N000082 HC SURGERY LEVEL 2 W/ FLUORO, PER MIN: Performed by: INTERNAL MEDICINE

## 2022-11-22 PROCEDURE — 250N000009 HC RX 250: Performed by: NURSE ANESTHETIST, CERTIFIED REGISTERED

## 2022-11-22 PROCEDURE — 272N000002 HC OR SUPPLY OTHER OPNP: Performed by: INTERNAL MEDICINE

## 2022-11-22 PROCEDURE — 250N000009 HC RX 250: Performed by: INTERNAL MEDICINE

## 2022-11-22 PROCEDURE — 250N000011 HC RX IP 250 OP 636

## 2022-11-22 RX ORDER — SODIUM CHLORIDE, SODIUM LACTATE, POTASSIUM CHLORIDE, CALCIUM CHLORIDE 600; 310; 30; 20 MG/100ML; MG/100ML; MG/100ML; MG/100ML
INJECTION, SOLUTION INTRAVENOUS CONTINUOUS
Status: DISCONTINUED | OUTPATIENT
Start: 2022-11-22 | End: 2022-11-22 | Stop reason: HOSPADM

## 2022-11-22 RX ORDER — DIPHENHYDRAMINE HYDROCHLORIDE 50 MG/ML
INJECTION INTRAMUSCULAR; INTRAVENOUS PRN
Status: DISCONTINUED | OUTPATIENT
Start: 2022-11-22 | End: 2022-11-22

## 2022-11-22 RX ORDER — FLUMAZENIL 0.1 MG/ML
0.2 INJECTION, SOLUTION INTRAVENOUS
Status: DISCONTINUED | OUTPATIENT
Start: 2022-11-22 | End: 2022-11-22 | Stop reason: HOSPADM

## 2022-11-22 RX ORDER — PROMETHAZINE HYDROCHLORIDE 25 MG/ML
25 INJECTION, SOLUTION INTRAMUSCULAR; INTRAVENOUS ONCE
Status: COMPLETED | OUTPATIENT
Start: 2022-11-22 | End: 2022-11-22

## 2022-11-22 RX ORDER — ONDANSETRON 4 MG/1
4 TABLET, ORALLY DISINTEGRATING ORAL EVERY 6 HOURS PRN
Status: DISCONTINUED | OUTPATIENT
Start: 2022-11-22 | End: 2022-11-22 | Stop reason: HOSPADM

## 2022-11-22 RX ORDER — HYDROMORPHONE HCL IN WATER/PF 6 MG/30 ML
0.2 PATIENT CONTROLLED ANALGESIA SYRINGE INTRAVENOUS ONCE
Status: COMPLETED | OUTPATIENT
Start: 2022-11-22 | End: 2022-11-22

## 2022-11-22 RX ORDER — PROPOFOL 10 MG/ML
INJECTION, EMULSION INTRAVENOUS PRN
Status: DISCONTINUED | OUTPATIENT
Start: 2022-11-22 | End: 2022-11-22

## 2022-11-22 RX ORDER — LIDOCAINE 40 MG/G
CREAM TOPICAL
Status: DISCONTINUED | OUTPATIENT
Start: 2022-11-22 | End: 2022-11-22 | Stop reason: HOSPADM

## 2022-11-22 RX ORDER — FENTANYL CITRATE 50 UG/ML
25 INJECTION, SOLUTION INTRAMUSCULAR; INTRAVENOUS EVERY 5 MIN PRN
Status: DISCONTINUED | OUTPATIENT
Start: 2022-11-22 | End: 2022-11-22 | Stop reason: HOSPADM

## 2022-11-22 RX ORDER — HEPARIN SODIUM (PORCINE) LOCK FLUSH IV SOLN 100 UNIT/ML 100 UNIT/ML
5-10 SOLUTION INTRAVENOUS
Status: DISCONTINUED | OUTPATIENT
Start: 2022-11-22 | End: 2022-11-22 | Stop reason: HOSPADM

## 2022-11-22 RX ORDER — FENTANYL CITRATE 50 UG/ML
50 INJECTION, SOLUTION INTRAMUSCULAR; INTRAVENOUS EVERY 5 MIN PRN
Status: DISCONTINUED | OUTPATIENT
Start: 2022-11-22 | End: 2022-11-22 | Stop reason: HOSPADM

## 2022-11-22 RX ORDER — LIDOCAINE HYDROCHLORIDE 20 MG/ML
INJECTION, SOLUTION INFILTRATION; PERINEURAL PRN
Status: DISCONTINUED | OUTPATIENT
Start: 2022-11-22 | End: 2022-11-22

## 2022-11-22 RX ORDER — NALOXONE HYDROCHLORIDE 0.4 MG/ML
0.4 INJECTION, SOLUTION INTRAMUSCULAR; INTRAVENOUS; SUBCUTANEOUS
Status: DISCONTINUED | OUTPATIENT
Start: 2022-11-22 | End: 2022-11-22 | Stop reason: HOSPADM

## 2022-11-22 RX ORDER — ONDANSETRON 2 MG/ML
4 INJECTION INTRAMUSCULAR; INTRAVENOUS EVERY 6 HOURS PRN
Status: DISCONTINUED | OUTPATIENT
Start: 2022-11-22 | End: 2022-11-22 | Stop reason: HOSPADM

## 2022-11-22 RX ORDER — NALOXONE HYDROCHLORIDE 0.4 MG/ML
0.2 INJECTION, SOLUTION INTRAMUSCULAR; INTRAVENOUS; SUBCUTANEOUS
Status: DISCONTINUED | OUTPATIENT
Start: 2022-11-22 | End: 2022-11-22 | Stop reason: HOSPADM

## 2022-11-22 RX ORDER — SODIUM CHLORIDE, SODIUM LACTATE, POTASSIUM CHLORIDE, CALCIUM CHLORIDE 600; 310; 30; 20 MG/100ML; MG/100ML; MG/100ML; MG/100ML
INJECTION, SOLUTION INTRAVENOUS CONTINUOUS PRN
Status: DISCONTINUED | OUTPATIENT
Start: 2022-11-22 | End: 2022-11-22

## 2022-11-22 RX ORDER — PROCHLORPERAZINE MALEATE 5 MG
10 TABLET ORAL EVERY 6 HOURS PRN
Status: DISCONTINUED | OUTPATIENT
Start: 2022-11-22 | End: 2022-11-22 | Stop reason: HOSPADM

## 2022-11-22 RX ORDER — IOPAMIDOL 510 MG/ML
INJECTION, SOLUTION INTRAVASCULAR PRN
Status: DISCONTINUED | OUTPATIENT
Start: 2022-11-22 | End: 2022-11-22 | Stop reason: HOSPADM

## 2022-11-22 RX ADMIN — SUGAMMADEX 200 MG: 100 INJECTION, SOLUTION INTRAVENOUS at 13:48

## 2022-11-22 RX ADMIN — PROMETHAZINE HYDROCHLORIDE 25 MG: 25 INJECTION INTRAMUSCULAR; INTRAVENOUS at 13:16

## 2022-11-22 RX ADMIN — Medication 100 MG: at 13:08

## 2022-11-22 RX ADMIN — DIPHENHYDRAMINE HYDROCHLORIDE 25 MG: 50 INJECTION, SOLUTION INTRAMUSCULAR; INTRAVENOUS at 13:15

## 2022-11-22 RX ADMIN — PROPOFOL 150 MCG/KG/MIN: 10 INJECTION, EMULSION INTRAVENOUS at 13:09

## 2022-11-22 RX ADMIN — SODIUM CHLORIDE, POTASSIUM CHLORIDE, SODIUM LACTATE AND CALCIUM CHLORIDE: 600; 310; 30; 20 INJECTION, SOLUTION INTRAVENOUS at 13:03

## 2022-11-22 RX ADMIN — MIDAZOLAM 2 MG: 1 INJECTION INTRAMUSCULAR; INTRAVENOUS at 13:03

## 2022-11-22 RX ADMIN — PHENYLEPHRINE HYDROCHLORIDE 100 MCG: 10 INJECTION INTRAVENOUS at 13:31

## 2022-11-22 RX ADMIN — PHENYLEPHRINE HYDROCHLORIDE 100 MCG: 10 INJECTION INTRAVENOUS at 13:36

## 2022-11-22 RX ADMIN — PROCHLORPERAZINE EDISYLATE 5 MG: 5 INJECTION INTRAMUSCULAR; INTRAVENOUS at 14:44

## 2022-11-22 RX ADMIN — PROPOFOL 100 MG: 10 INJECTION, EMULSION INTRAVENOUS at 13:08

## 2022-11-22 RX ADMIN — PHENYLEPHRINE HYDROCHLORIDE 100 MCG: 10 INJECTION INTRAVENOUS at 13:20

## 2022-11-22 RX ADMIN — SUGAMMADEX 100 MG: 100 INJECTION, SOLUTION INTRAVENOUS at 13:54

## 2022-11-22 RX ADMIN — LIDOCAINE HYDROCHLORIDE 60 MG: 20 INJECTION, SOLUTION INFILTRATION; PERINEURAL at 13:08

## 2022-11-22 RX ADMIN — HYDROMORPHONE HYDROCHLORIDE 0.2 MG: 0.2 INJECTION, SOLUTION INTRAMUSCULAR; INTRAVENOUS; SUBCUTANEOUS at 16:40

## 2022-11-22 RX ADMIN — PHENYLEPHRINE HYDROCHLORIDE 100 MCG: 10 INJECTION INTRAVENOUS at 13:25

## 2022-11-22 RX ADMIN — FENTANYL CITRATE 25 MCG: 50 INJECTION, SOLUTION INTRAMUSCULAR; INTRAVENOUS at 15:06

## 2022-11-22 ASSESSMENT — ACTIVITIES OF DAILY LIVING (ADL)
ADLS_ACUITY_SCORE: 39
ADLS_ACUITY_SCORE: 39
ADLS_ACUITY_SCORE: 37
ADLS_ACUITY_SCORE: 37

## 2022-11-22 NOTE — ANESTHESIA PROCEDURE NOTES
Airway       Patient location during procedure: OR       Procedure Start/Stop Times: 11/22/2022 1:13 PM  Staff -        CRNA: Antoinette Tubbs APRN CRNA       Performed By: CRNA  Consent for Airway        Urgency: elective  Indications and Patient Condition       Indications for airway management: andrez-procedural       Induction type:RSI       Mask difficulty assessment: 0 - not attempted    Final Airway Details       Final airway type: endotracheal airway       Successful airway: ETT - single and Oral  Endotracheal Airway Details        ETT size (mm): 7.0       Cuffed: yes       Successful intubation technique: direct laryngoscopy       DL Blade Type: Pino 2       Grade View of Cords: 1       Adjucts: stylet       Position: Right       Measured from: gums/teeth       Secured at (cm): 21       Bite block used: None    Post intubation assessment        Placement verified by: capnometry, equal breath sounds and chest rise        Number of attempts at approach: 1       Number of other approaches attempted: 0       Secured with: pink tape       Ease of procedure: easy       Dentition: Intact and Unchanged    Medication(s) Administered   Medication Administration Time: 11/22/2022 1:13 PM

## 2022-11-22 NOTE — ANESTHESIA CARE TRANSFER NOTE
Patient: Dinora Mcghee    Procedure: Procedure(s):  REPLACEMENT, GASTROJEJUNOSTOMY TUBE, PERCUTANEOUS       Diagnosis: Gastroparesis [K31.84]  Diagnosis Additional Information: No value filed.    Anesthesia Type:   No value filed.     Note:    Oropharynx: oropharynx clear of all foreign objects and spontaneously breathing  Level of Consciousness: drowsy  Oxygen Supplementation: face mask  Level of Supplemental Oxygen (L/min / FiO2): 4  Independent Airway: airway patency satisfactory and stable  Dentition: dentition unchanged  Vital Signs Stable: post-procedure vital signs reviewed and stable  Report to RN Given: handoff report given  Patient transferred to: PACU  Comments: VSS, report given to RN.    Handoff Report: Identifed the Patient, Identified the Reponsible Provider, Reviewed the pertinent medical history, Discussed the surgical course, Reviewed Intra-OP anesthesia mangement and issues during anesthesia, Set expectations for post-procedure period and Allowed opportunity for questions and acknowledgement of understanding      Vitals:  Vitals Value Taken Time   /70    Temp     Pulse 91    Resp 14    SpO2 100 % 11/22/22 1404   Vitals shown include unvalidated device data.    Electronically Signed By: NATALY Wei CRNA  November 22, 2022  2:05 PM

## 2022-11-22 NOTE — ANESTHESIA POSTPROCEDURE EVALUATION
Patient: Dinora Mcghee    Procedure: Procedure(s):  REPLACEMENT, GASTROJEJUNOSTOMY TUBE, PERCUTANEOUS       Anesthesia Type:  No value filed.    Note:  Disposition: Outpatient   Postop Pain Control: Uneventful            Sign Out: Well controlled pain   PONV: No   Neuro/Psych: Uneventful            Sign Out: Acceptable/Baseline neuro status   Airway/Respiratory: Uneventful            Sign Out: Acceptable/Baseline resp. status   CV/Hemodynamics: Uneventful            Sign Out: Acceptable CV status; No obvious hypovolemia; No obvious fluid overload   Other NRE: NONE   DID A NON-ROUTINE EVENT OCCUR? No           Last vitals:  Vitals Value Taken Time   /73 11/22/22 1530   Temp     Pulse 84 11/22/22 1534   Resp     SpO2 97 % 11/22/22 1534   Vitals shown include unvalidated device data.    Electronically Signed By: Amanda Chilel MD  November 22, 2022  3:36 PM

## 2022-11-22 NOTE — BRIEF OP NOTE
[facility]    Brief Operative Note  Dinora Mcghee is a 57 year old female with a PMHx of TPIAT with course complicated by gastroparesis who has continued issues with her jejunal extension of her GJ tube which she requires for feeds and hydration. She last underwent placement of 18 Fr KISHORE-KEY 3.5cm low-profile G-tube placed. She had symptoms of nausea and early satiety and would like to trial a G-J tube. She presents for consideration of exchaning her G-tube to G-J tube, ideally AMT brand GJ    Pre-operative diagnosis: Gastroparesis [K31.84]  Post-operative diagnosis Same as pre-operative diagnosis    Procedure: Procedure(s):  REPLACEMENT, GASTROJEJUNOSTOMY TUBE, PERCUTANEOUS  Surgeon: Surgeon(s) and Role:     * Mandeep Park MD - Primary     * Francisco Dodson MD - Assisting     * Carolyn Quintero MD - Resident - Assisting  Anesthesia: General   Estimated Blood Loss: 0 mL from 11/22/2022  1:03 PM to 11/22/2022  2:01 PM      Drains: None  Specimens: * No specimens in log *  Findings:     - Uncomplicated placement of a new 18 Fr by 45 cm percutaneous GJ tube (AMT brand GJ) via intact prior gastrostomy site and endoscopic wire placement    Complications: None.  Implants: * No implants in log *     Recommendations  - Observe patient in same day observation unit for ongoing care with plans for discharge to home later today   - Ok to use the PEG for venting and-J for feeding per nutrition   - The findings and recommendations were discussed with the patient's family

## 2022-11-22 NOTE — DISCHARGE INSTRUCTIONS
[facility]  Same-Day Surgery   Adult Discharge Orders & Instructions   For 24 hours after surgery    Get plenty of rest.  A responsible adult must stay with you for at least 24 hours after you leave the hospital.   Do not drive or use heavy equipment.  If you have weakness or tingling, don't drive or use heavy equipment until this feeling goes away.  Do not drink alcohol.  Avoid strenuous or risky activities.  Ask for help when climbing stairs.   You may feel lightheaded.  IF so, sit for a few minutes before standing.  Have someone help you get up.   If you have nausea (feel sick to your stomach): Drink only clear liquids such as apple juice, ginger ale, broth or 7-Up.  Rest may also help.  Be sure to drink enough fluids.  Move to a regular diet as you feel able.  You may have a slight fever. Call the doctor if your fever is over 100 F (37.7 C) (taken under the tongue) or lasts longer than 24 hours.  You may have a dry mouth, a sore throat, muscle aches or trouble sleeping.  These should go away after 24 hours.  Do not make important or legal decisions.   Call your doctor for any of the followin.  Signs of infection (fever, growing tenderness at the surgery site, a large amount of drainage or bleeding, severe pain, foul-smelling drainage, redness, swelling).    2. It has been over 8 to 10 hours since surgery and you are still not able to urinate (pass water).    3.  Headache for over 24 hours.    To contact a doctor, call Dr Park's clinic at [phone] or:    '   [phone] and ask for the resident on call for GI (answered 24 hours a day)  '   Emergency Department:  [facility]: [phone]      
Tonsillitis    WHAT YOU NEED TO KNOW:    Tonsillitis is inflammation of your tonsils. Tonsils are the lumps of tissue on both sides of the back of your throat. Tonsils are part of your immune system. They help you fight infections. Recurrent tonsillitis is when you have tonsillitis many times in 1 year. Chronic tonsillitis is when you have a sore throat that lasts 3 months or longer. Mouth Anatomy         DISCHARGE INSTRUCTIONS:    Medicines: You may need any of the following:     Acetaminophen decreases pain and fever. It is available without a doctor's order. Ask how much to take and how often to take it. Follow directions. Acetaminophen can cause liver damage if not taken correctly.      NSAIDs, such as ibuprofen, help decrease swelling, pain, and fever. This medicine is available with or without a doctor's order. NSAIDs can cause stomach bleeding or kidney problems in certain people. If you take blood thinner medicine, always ask your healthcare provider if NSAIDs are safe for you. Always read the medicine label and follow directions.      Antibiotics help treat a bacterial infection.      Take your medicine as directed. Contact your healthcare provider if you think your medicine is not helping or if you have side effects. Tell him or her if you are allergic to any medicine. Keep a list of the medicines, vitamins, and herbs you take. Include the amounts, and when and why you take them. Bring the list or the pill bottles to follow-up visits. Carry your medicine list with you in case of an emergency.    Call 911 for the following:     You have trouble breathing because your tonsils are swollen.        Contact your healthcare provider if:     You have a fever.      Your pain gets worse or does not get better after you take pain medicine.      Your sore throat is not better after you have finished antibiotic treatment.      You have trouble sleeping and wake up trying to catch your breath.      You have questions or concerns about your condition or care.    Rest when you feel it is needed. Slowly start to do more each day. Return to your daily activities as directed.     Drink liquids as directed: You may need to drink more liquid than usual to help prevent dehydration. Ask how much liquid to drink each day and which liquids are best for you.     Gargle with warm salt water: This may help decrease throat pain. Mix 1 teaspoon of salt in 8 ounces of warm water. Ask how often you should do this.    Prevent the spread of germs: Wash your hands often. Do not share food or drinks with anyone. You may be able to return to work when you feel better and your fever is gone for at least 24 hours.    Follow up with your healthcare provider as directed: Write down your questions so you remember to ask them during your visits.        © Copyright [employer] 2019 All illustrations and images included in CareNotes are the copyrighted property of [employer] or [employer].

## 2022-11-22 NOTE — ANESTHESIA PREPROCEDURE EVALUATION
Anesthesia Pre-Procedure Evaluation    Patient: Dinora Mcghee   MRN: [id number] : 1965        Procedure : Procedure(s):  REPLACEMENT, GASTROJEJUNOSTOMY TUBE, PERCUTANEOUS          Past Medical History:   Diagnosis Date     Allergic rhinitis, cause unspecified      Allergy to other foods     strawberries, apples, celeries, alice, watermelon     Arthritis     left knee     Choledocholithiasis     long after cholecystectomy     Chronic abdominal pain      Chronic constipation      Chronic nausea      Chronic pancreatitis (H)      Degeneration of lumbar or lumbosacral intervertebral disc      Esophageal reflux     w/ hiatal hernia     Gastroparesis      Hiatal hernia      History of pituitary adenoma     s/p resection     Hypothyroidism      Migraines      Mild hyperlipidemia      On tube feeding diet     presence of GJ tube     Pancreatic disease     PD stricture, 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 endometriosis growth     APPENDECTOMY        SECTION       COLONOSCOPY       ENDOSCOPIC INSERTION TUBE GASTROSTOMY N/A 2021    Procedure: Gastrojejonostomy placement with jejunopexy, PEG tube exchange;  Surgeon: Zackery Montoya MD;  [facility]: [facility]     ENDOSCOPIC RETROGRADE CHOLANGIOPANCREATOGRAM N/A 2015    Procedure: ENDOSCOPIC RETROGRADE CHOLANGIOPANCREATOGRAM;  Surgeon: Mandeep Park MD;  [facility]: [facility]     ENDOSCOPIC RETROGRADE CHOLANGIOPANCREATOGRAM N/A 2016    Procedure: COMBINED ENDOSCOPIC RETROGRADE CHOLANGIOPANCREATOGRAPHY, PLACE TUBE/STENT;  Surgeon: Mandeep Park MD;  [facility]     ENDOSCOPIC RETROGRADE CHOLANGIOPANCREATOGRAM N/A  3/17/2016    Procedure: COMBINED ENDOSCOPIC RETROGRADE CHOLANGIOPANCREATOGRAPHY, REMOVE FOREIGN BODY OR STENT/TUBE;  Surgeon: Mandeep Park MD;  [facility]: [facility]     ENDOSCOPIC RETROGRADE CHOLANGIOPANCREATOGRAM N/A 8/2/2016    Procedure: COMBINED ENDOSCOPIC RETROGRADE CHOLANGIOPANCREATOGRAPHY, PLACE TUBE/STENT;  Surgeon: Mandeep Park MD;  [facility]     ENDOSCOPIC RETROGRADE CHOLANGIOPANCREATOGRAM N/A 8/26/2016    Procedure: COMBINED ENDOSCOPIC RETROGRADE CHOLANGIOPANCREATOGRAPHY, REMOVE FOREIGN BODY OR STENT/TUBE;  Surgeon: Mandeep Park MD;  [facility]     ENDOSCOPIC ULTRASOUND UPPER GASTROINTESTINAL TRACT (GI) N/A 10/3/2016    Procedure: ENDOSCOPIC ULTRASOUND, ESOPHAGOSCOPY / UPPER GASTROINTESTINAL TRACT (GI);  Surgeon: Guru Jose Rodas MD;  [facility]: [facility]     ENTEROSCOPY SMALL BOWEL N/A 2/3/2022    Procedure: ENTEROSCOPY with possible fistula closure;  Surgeon: Francisco Dodson MD;  Location:  GI     ESOPHAGOSCOPY, GASTROSCOPY, DUODENOSCOPY (EGD), COMBINED N/A 6/24/2015    Procedure: COMBINED ESOPHAGOSCOPY, GASTROSCOPY, DUODENOSCOPY (EGD), REMOVE FOREIGN BODY;  Surgeon: Mandeep Park MD;  Location:  GI     ESOPHAGOSCOPY, GASTROSCOPY, DUODENOSCOPY (EGD), COMBINED N/A 10/25/2015    Procedure: COMBINED ESOPHAGOSCOPY, GASTROSCOPY, DUODENOSCOPY (EGD);  Surgeon: Sammy Amaro MD;  Location:  GI     ESOPHAGOSCOPY, GASTROSCOPY, DUODENOSCOPY (EGD), COMBINED N/A 10/25/2015    Procedure: COMBINED ESOPHAGOSCOPY, GASTROSCOPY, DUODENOSCOPY (EGD), BIOPSY SINGLE OR MULTIPLE;  Surgeon: Sammy Amaro MD;  Location:  GI     ESOPHAGOSCOPY, GASTROSCOPY, DUODENOSCOPY (EGD), DILATATION, COMBINED       EXCISE LESION TRUNK N/A 4/17/2017    Procedure: EXCISE LESION TRUNK;  Removal of Abdominal Foreign Body;  Surgeon: Nestor Phoenix MD;  [facility]     HC ESOPH/GAS REFLUX TEST W NASAL IMPED >1 HR N/A 11/19/2015    Procedure: ESOPHAGEAL IMPEDENCE  FUNCTION TEST WITH 24 HOUR PH GREATER THAN 1 HOUR;  Surgeon: Thiago Apple MD;  Location: [facility] GI     HC UGI ENDOSCOPY DIAG W BIOPSY  9/17/08     HC UGI ENDOSCOPY DIAG W BIOPSY  9/27/12     HC UGI ENDOSCOPY W ESOPHAGEAL DILATION BALLOON <30MM  9/17/08     HC UGI ENDOSCOPY W EUS N/A 5/5/2015    Procedure: COMBINED ENDOSCOPIC ULTRASOUND, ESOPHAGOSCOPY, GASTROSCOPY, DUODENOSCOPY (EGD);  Surgeon: Wm Dueñas MD;  [facility]: [facility]     HC WRIST ARTHROSCOP,RELEASE XVERS LIG Bilateral 12/17/08     INJECT TRANSVERSUS ABDOMINIS PLANE (TAP) BLOCK BILATERAL Left 9/22/2016    Procedure: INJECT TRANSVERSUS ABDOMINIS PLANE (TAP) BLOCK BILATERAL;  Surgeon: Dickson Corrigan MD;  [facility]     INJECT TRIGGER POINT Bilateral 9/8/2022    Procedure: Abdominal trigger point injection with ultrasound;  Surgeon: Monika Mahajan MD;  Location: [facility] OR     INJECT TRIGGER POINT SINGLE / MULTIPLE 3 OR MORE MUSCLES Right 11/15/2022    Procedure: Trigger point injections right abdomen with ultrasound guidance;  Surgeon: Monika Mahajan MD;  Location: [facility] OR     IR CHEST PORT PLACEMENT < 5 YRS OF AGE  6/10/2022     laparoscopic pineda  1995     LAPAROSCOPIC HERNIORRHAPHY INCISIONAL N/A 8/23/2018    Procedure: LAPAROSCOPIC HERNIORRHAPHY INCISIONAL;  Laparoscopic Incisional Hernia Repair with Symbotex Mesh Implant;  Surgeon: Nestor Phoenix MD;  [facility]     LAPAROSCOPIC PANCREATECTOMY, TRANSPLANT AUTO ISLET CELL N/A 12/28/2016    Procedure: LAPAROSCOPIC PANCREATECTOMY, TRANSPLANT AUTO ISLET CELL;  Surgeon: Nestor Phoenix MD;  [facility]     REMOVE GASTROSTOMY TUBE ADULT N/A 1/28/2022    Procedure: REMOVAL, GASTROSTOMY TUBE, ADULT;  Surgeon: Mandeep Park MD;  [facility]     REPLACE GASTROJEJUNOSTOMY TUBE, PERCUTANEOUS N/A 9/7/2021    Procedure: GASTROJEJUNOSTOMY TUBE PLACEMENT, PERCUTANEOUS, WITH GASTROPEXY;  Surgeon: Mandeep Park MD;  [facility]     REPLACE GASTROJEJUNOSTOMY TUBE,  PERCUTANEOUS N/A 2021    Procedure: REPLACEMENT, GASTROJEJUNOSTOMY TUBE, PERCUTANEOUS;  Surgeon: Zackery Montoya MD;  [facility]     REPLACE JEJUNOSTOMY TUBE, PERCUTANEOUS N/A 9/10/2021    Procedure: UPPER ENDOSCOPY, REPLACEMENT OF PERCUTANEOUS GASTROJEJUNOSTOMY TUBE, T-TAG GASTROPEXY;  Surgeon: Zackery Montoya MD;  [facility]     REPLACE JEJUNOSTOMY TUBE, PERCUTANEOUS N/A 2021    Procedure: REPLACEMENT, JEJUNOSTOMY TUBE, PERCUTANEOUS;  Surgeon: Mandeep Park MD;  [facility]: [facility]     transphenoidal pituitary resection       Z  DELIVERY ONLY       Z  DELIVERY ONLY      repeat c section with incidental cystotomy with repair     Z EXCIS PITUITARY,TRANSNASAL/SEPTAL      pituitary tumor removed for diabetes insipidus     ZC TOTAL ABDOM HYSTERECTOMY      w/ bilateral salpingoophorectomy       Allergies   Allergen Reactions     Apple Anaphylaxis     Corticosteroids Other (See Comments)     All oral, IV and injectable steroids are contraindicated (unless in life threatening situations) in Islet Auto transplant recipients. They can cause irreversible loss of islet cell function. Please contact patient's transplant care coordinator ADI Gaffney RN at [phone]/pager [phone] and/or endocrinologist prior to administration.       Depakote [Divalproex Sodium] Other (See Comments)     Chest pain     Zithromax [Azithromycin Dihydrate] Anaphylaxis     Noted in 08 ER     Bromfenac      Other reaction(s): Headache     Codeine      Other reaction(s): Hallucinations     Darvocet [Propoxyphene N-Apap] Itching     Morphine Nausea and Vomiting and Rash     Nalbuphine Hcl Rash     RASH :nubaine     Zosyn [Piperacillin-Tazobactam In D5w] Rash     Possible allergy, did have a diffuse rash that seemed drug related but could have also been related to soap in the hospital.      Bactrim [Sulfamethoxazole W-Trimethoprim] Other (See Comments) and  Nausea and Vomiting     Severely low liver function.     Tape [Adhesive Tape] Blisters     Tramadol      Zofran [Ondansetron] Other (See Comments)     migraine     Flagyl [Metronidazole] Hives and Rash      Social History     Tobacco Use     Smoking status: Former     Packs/day: 0.50     Years: 6.00     Pack years: 3.00     Types: Cigarettes     Start date: 1985     Quit date: 1992     Years since quittin.9     Smokeless tobacco: Never     Tobacco comments:     no 2nd hand   Substance Use Topics     Alcohol use: Not Currently     Alcohol/week: 3.0 - 6.0 standard drinks     Types: 1 - 2 Glasses of wine, 1 - 2 Cans of beer, 1 - 2 Shots of liquor per week     Comment: none since IGG      Wt Readings from Last 1 Encounters:   22 64.5 kg (142 lb 3.2 oz)        Anesthesia Evaluation   Pt has had prior anesthetic. Type: General.    No history of anesthetic complications       ROS/MED HX  ENT/Pulmonary:       Neurologic:       Cardiovascular:       METS/Exercise Tolerance: >4 METS    Hematologic:       Musculoskeletal:       GI/Hepatic: Comment: S/p auto islet cell transplant    (+) GERD, hiatal hernia,     Renal/Genitourinary:       Endo:     (+) type I DM, thyroid problem, hypothyroidism,     Psychiatric/Substance Use:       Infectious Disease:       Malignancy:       Other:            Physical Exam    Airway        Mallampati: I   TM distance: > 3 FB   Neck ROM: full   Mouth opening: > 3 cm    Respiratory Devices and Support         Dental  no notable dental history         Cardiovascular             Pulmonary                   OUTSIDE LABS:  CBC:   Lab Results   Component Value Date    WBC 6.3 06/10/2022    WBC 5.0 2022    HGB 12.5 06/10/2022    HGB 13.1 2022    HCT 38.4 06/10/2022    HCT 40.0 2022     06/10/2022     2022     BMP:   Lab Results   Component Value Date     2022     06/10/2022    POTASSIUM 4.1 2022    POTASSIUM 4.1 06/10/2022     CHLORIDE 107 08/01/2022    CHLORIDE 108 06/10/2022    CO2 28 08/01/2022    CO2 26 06/10/2022    BUN 14 08/01/2022    BUN 12 06/10/2022    CR 0.67 08/01/2022    CR 0.58 06/10/2022    GLC 84 11/22/2022     (H) 08/01/2022     COAGS:   Lab Results   Component Value Date    PTT 29 01/07/2017    INR 1.03 06/10/2022    FIBR 225 12/28/2016     POC:   Lab Results   Component Value Date    BGM 85 08/24/2018    HCG Negative 12/19/2016     HEPATIC:   Lab Results   Component Value Date    ALBUMIN 4.2 02/17/2022    PROTTOTAL 7.8 02/17/2022    ALT 31 02/17/2022    AST 23 02/17/2022    ALKPHOS 98 02/17/2022    BILITOTAL 0.9 02/17/2022     OTHER:   Lab Results   Component Value Date    PH 7.49 (H) 12/28/2016    LACT 0.9 12/03/2021    A1C 5.4 02/17/2022    KASSI 9.2 08/01/2022    PHOS 3.5 12/07/2021    MAG 2.1 12/07/2021    LIPASE <10 (L) 12/03/2021    AMYLASE 45 01/23/2017    TSH 0.67 09/16/2021    T4 1.13 03/23/2018    CRP 90.0 (H) 12/29/2016    SED 10 09/16/2021       Anesthesia Plan    ASA Status:  3   NPO Status:  NPO Appropriate    Anesthesia Type: General.     - Airway: ETT   Induction: RSI.   Maintenance: TIVA.   Techniques and Equipment:     - Lines/Monitors: Port in situ     Consents    Anesthesia Plan(s) and associated risks, benefits, and realistic alternatives discussed. Questions answered and patient/representative(s) expressed understanding.    - Discussed:     - Discussed with:  Patient      - Extended Intubation/Ventilatory Support Discussed: No.      - Patient is DNR/DNI Status: No    Use of blood products discussed: No .     Postoperative Care    Pain management: Multi-modal analgesia.   PONV prophylaxis: Ondansetron (or other 5HT-3), Dexamethasone or Solumedrol, Scopolamine patch, Background Propofol Infusion     Comments:                Ivy Reynoso MD

## 2022-11-23 LAB — UPPER GI ENDOSCOPY: NORMAL

## 2022-11-27 NOTE — PROGRESS NOTES
This is a recent snapshot of the patient's [facility] medical record.  For current drug dose and complete information and questions, call [phone] or In Basket pool, [facility] home infusion ([id number])  CSN Number:  [id number]

## 2022-12-01 ENCOUNTER — OFFICE VISIT (OUTPATIENT)
Dept: TRANSPLANT | Facility: CLINIC | Age: 57
End: 2022-12-01
Attending: PEDIATRICS
Payer: COMMERCIAL

## 2022-12-01 VITALS
HEART RATE: 92 BPM | OXYGEN SATURATION: 98 % | DIASTOLIC BLOOD PRESSURE: 79 MMHG | SYSTOLIC BLOOD PRESSURE: 124 MMHG | BODY MASS INDEX: 22.65 KG/M2 | WEIGHT: 144.6 LBS

## 2022-12-01 DIAGNOSIS — K91.2 HYPOGLYCEMIA AFTER GI (GASTROINTESTINAL) SURGERY: ICD-10-CM

## 2022-12-01 DIAGNOSIS — R35.89 POLYURIA: Primary | ICD-10-CM

## 2022-12-01 PROCEDURE — 99214 OFFICE O/P EST MOD 30 MIN: CPT | Performed by: PEDIATRICS

## 2022-12-01 ASSESSMENT — PAIN SCALES - GENERAL: PAINLEVEL: NO PAIN (0)

## 2022-12-01 NOTE — LETTER
12/1/2022         RE: Dinora Mcghee  [address] [facility] [address]        Dear Colleague,    Thank you for referring your patient, Dinora Mcghee, to the [facility]. Please see a copy of my visit note below.    [facility]  Islet Autotransplant, Diabetes Follow Up    Problem List:  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     Incisional hernia, without obstruction or gangrene     Adhesive capsulitis of shoulder, unspecified laterality     S/P hernia repair     Headache, chronic migraine without aura     Chronic pain syndrome     Iron deficiency anemia     Hypoglycemia     Adult failure to thrive     Abdominal pain, generalized     Gastrostomy tube obstruction (H)     Intra-abdominal abscess (H)     Postprocedural intraabdominal abscess     Acquired absence of spleen     Depressive disorder     Diabetes insipidus (H)     Other specified abnormal immunological findings in serum     Poisoning by drug     Sphincter of Oddi dysfunction     Type 1 diabetes mellitus without complication (H)     Myofascial pain       HPI:  Dinora is a 57 year old female here for follow up oflaparoscopic assisted total pancreatectomy, islet cell autotransplant, splenectomy, gastrojejunostomy tube revision, choledochoduodenostomy, duodenojejunostomy, Vera-Y reconstruction performed on 12/28/2016.  At the time  of the procedure, the patient received:  Total Islet number: [id number].  Total Islet number/k.  Islet equivalents: [id number]  Islet equivalents/kilogram: 4091    Post-surgical course was complicated by two minor procedures for a retained foreign body near GJ site in 2017 and hernia repair 2018, and more recently by gastroparesis.  She has been insulin independent since about 1 year after surgery.    - Gastroparesis is her major issue post TPIAT. Admitted multiple times. GJ was helpful for short time but did not tolerate well.  Has tried multiple other approaches and continues to worsen, nothing offering benefit.  Is on our list to access domperidone once we have an IND to prescribe  - Also has hypoglycemia (prior treatments SGLT2 inhibitor not helpful, acarbose is helpful).     At today's visit,   -- Trial of domperidone was not effective, and her EKG QTc was borderline for continuing so therapy was discontinued  -- GJ tube was replaced late 2022, new type of tube that prevents coiling.  She is feeling great with this, best results she has had from a GJ so far.  Very little pain  -- Not eating much as eating dose cause pain.  -- Has been consolidating feeds from 19h to 12h and will now change these to run overnight, 90 mL/hour x 12 hours.  BG seem to be overall very stable when she is on TF  -- Occasional low and high number, not as much as at past visits with me.  If she does eat she will take 100 mg acarbose.  She also has access to glucagon for mini glucagon dosing which she finds very effective for treating low BG.  -- Sleep is a major issue, up at 1am last night and could not sleep after that except for brief nap from 3a-4a.  This is typical for her and she feels exhausted  --  and multiple family ill after Thanksgiving with influenza.   -- She is requiring IVF in addition to the GJ and feels like she has a fairly high fluid need and urinates high volumes out.  She did have a diagnosis of  diabetes insipidus in the distant past associated with a pituitary tumor.      Diabetes history:  Current insulin regimen:  None  Other treatments for hypoglycemia are as described above.     Wearing Yandy              Recent hemoglobin A1c levels:  Lab Results   Component Value Date    A1C 5.4 02/17/2022    A1C 5.2 11/27/2021    A1C 5.5 09/18/2021    A1C 5.4 08/05/2021    A1C 5.7 05/12/2021    A1C 5.9 04/01/2021    A1C 5.5 12/17/2020    A1C 5.8 07/30/2020     Hypoglycemia history: mild lows but rapid falls- less of an issue recentl.y. The patient has had 0 episodes of severe hypoglycemia (seizure, coma, or neuroglycopenic symptoms severe enough to require assistance from another person).  Blood sugars were reviewed from the patient records and/or the meter download.          Review of systems:  A complete ROS is negative except as noted in HPI above.  With her gastroparesis, she does have vomiting and bloating.  She is stopping amitiza and mag citrate and starting a new motility med.    Past Medical and Surgical History:  Past Medical History:   Diagnosis Date     Allergic rhinitis, cause unspecified      Allergy to other foods     strawberries, apples, celeries, alice, watermelon     Arthritis     left knee     Choledocholithiasis     long after cholecystectomy     Chronic abdominal pain      Chronic constipation      Chronic nausea      Chronic pancreatitis (H)      Degeneration of lumbar or lumbosacral intervertebral disc      Esophageal reflux     w/ hiatal hernia     Gastroparesis      Hiatal hernia      History of pituitary adenoma     s/p resection     Hypothyroidism      Migraines      Mild hyperlipidemia      On tube feeding diet     presence of GJ tube     Pancreatic disease     PD stricture, 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      PONV (postoperative nausea and vomiting)      Portacath in place      Type 1 diabetes mellitus without complication (H) 5/9/2022     Unspecified hearing loss     25% high  frequency R     Past Surgical History:   Procedure Laterality Date     ABDOMEN SURGERY      c sections: 93, 96, 98. endometriosis growth     APPENDECTOMY        SECTION       COLONOSCOPY       ENDOSCOPIC INSERTION TUBE GASTROSTOMY N/A 2021    Procedure: Gastrojejonostomy placement with jejunopexy, PEG tube exchange;  Surgeon: Zackery Montoya MD;  [facility]     ENDOSCOPIC RETROGRADE CHOLANGIOPANCREATOGRAM N/A 2015    Procedure: ENDOSCOPIC RETROGRADE CHOLANGIOPANCREATOGRAM;  Surgeon: Mandeep Park MD;  [facility]     ENDOSCOPIC RETROGRADE CHOLANGIOPANCREATOGRAM N/A 2016    Procedure: COMBINED ENDOSCOPIC RETROGRADE CHOLANGIOPANCREATOGRAPHY, PLACE TUBE/STENT;  Surgeon: Mandeep Park MD;  [facility]     ENDOSCOPIC RETROGRADE CHOLANGIOPANCREATOGRAM N/A 3/17/2016    Procedure: COMBINED ENDOSCOPIC RETROGRADE CHOLANGIOPANCREATOGRAPHY, REMOVE FOREIGN BODY OR STENT/TUBE;  Surgeon: Mandeep Park MD;  [facility]     ENDOSCOPIC RETROGRADE CHOLANGIOPANCREATOGRAM N/A 2016    Procedure: COMBINED ENDOSCOPIC RETROGRADE CHOLANGIOPANCREATOGRAPHY, PLACE TUBE/STENT;  Surgeon: Mandeep Park MD;  [facility]     ENDOSCOPIC RETROGRADE CHOLANGIOPANCREATOGRAM N/A 2016    Procedure: COMBINED ENDOSCOPIC RETROGRADE CHOLANGIOPANCREATOGRAPHY, REMOVE FOREIGN BODY OR STENT/TUBE;  Surgeon: Mandeep Park MD;  [facility]     ENDOSCOPIC ULTRASOUND UPPER GASTROINTESTINAL TRACT (GI) N/A 10/3/2016    Procedure: ENDOSCOPIC ULTRASOUND, ESOPHAGOSCOPY / UPPER GASTROINTESTINAL TRACT (GI);  Surgeon: Guru Jose Rodas MD;  [facility]     ENTEROSCOPY SMALL BOWEL N/A 2/3/2022    Procedure: ENTEROSCOPY with possible fistula closure;  Surgeon: Francisco Dodson MD;  [facility]     ESOPHAGOSCOPY, GASTROSCOPY, DUODENOSCOPY (EGD), COMBINED N/A 2015    Procedure: COMBINED ESOPHAGOSCOPY, GASTROSCOPY,  DUODENOSCOPY (EGD), REMOVE FOREIGN BODY;  Surgeon: Mandeep Park MD;  [facility]     ESOPHAGOSCOPY, GASTROSCOPY, DUODENOSCOPY (EGD), COMBINED N/A 10/25/2015    Procedure: COMBINED ESOPHAGOSCOPY, GASTROSCOPY, DUODENOSCOPY (EGD);  Surgeon: Sammy Amaro MD;  [facility]     ESOPHAGOSCOPY, GASTROSCOPY, DUODENOSCOPY (EGD), COMBINED N/A 10/25/2015    Procedure: COMBINED ESOPHAGOSCOPY, GASTROSCOPY, DUODENOSCOPY (EGD), BIOPSY SINGLE OR MULTIPLE;  Surgeon: Sammy Amaro MD;  [facility]     ESOPHAGOSCOPY, GASTROSCOPY, DUODENOSCOPY (EGD), DILATATION, COMBINED       EXCISE LESION TRUNK N/A 4/17/2017    Procedure: EXCISE LESION TRUNK;  Removal of Abdominal Foreign Body;  Surgeon: Nestor Phoenix MD;  [facility]     HC ESOPH/GAS REFLUX TEST W NASAL IMPED >1 HR N/A 11/19/2015    Procedure: ESOPHAGEAL IMPEDENCE FUNCTION TEST WITH 24 HOUR PH GREATER THAN 1 HOUR;  Surgeon: Thiago Apple MD;  [facility]      [facility] ENDOSCOPY DIAG W BIOPSY  9/17/08     [facility] ENDOSCOPY DIAG W BIOPSY  9/27/12     [facility] U[facility] ENDOSCOPY W ESOPHAGEAL DILATION BALLOON <30MM  9/17/08     [facility] ENDOSCOPY W EUS N/A 5/5/2015    Procedure: COMBINED ENDOSCOPIC ULTRASOUND, ESOPHAGOSCOPY, GASTROSCOPY, DUODENOSCOPY (EGD);  Surgeon: Wm Dueñas MD;  [facility]     HC WRIST ARTHROSCOP,RELEASE XVERS LIG Bilateral 12/17/08     INJECT TRANSVERSUS ABDOMINIS PLANE (TAP) BLOCK BILATERAL Left 9/22/2016    Procedure: INJECT TRANSVERSUS ABDOMINIS PLANE (TAP) BLOCK BILATERAL;  Surgeon: Dickson Corrigan MD;  [facility]     INJECT TRIGGER POINT Bilateral 9/8/2022    Procedure: Abdominal trigger point injection with ultrasound;  Surgeon: Monika Mahajan MD;  [facility]     INJECT TRIGGER POINT SINGLE / MULTIPLE 3 OR MORE MUSCLES Right 11/15/2022    Procedure: Trigger point injections right abdomen with ultrasound guidance;  Surgeon: Monika Mahajan MD;  Location: [facility]     IR CHEST PORT PLACEMENT < 5  YRS OF AGE  6/10/2022     laparoscopic pineda       LAPAROSCOPIC HERNIORRHAPHY INCISIONAL N/A 2018    Procedure: LAPAROSCOPIC HERNIORRHAPHY INCISIONAL;  Laparoscopic Incisional Hernia Repair with Symbotex Mesh Implant;  Surgeon: Nestor Phoenix MD;  [facility]     LAPAROSCOPIC PANCREATECTOMY, TRANSPLANT AUTO ISLET CELL N/A 2016    Procedure: LAPAROSCOPIC PANCREATECTOMY, TRANSPLANT AUTO ISLET CELL;  Surgeon: Nestor Phoenix MD;  [facility]     REMOVE GASTROSTOMY TUBE ADULT N/A 2022    Procedure: REMOVAL, GASTROSTOMY TUBE, ADULT;  Surgeon: Mandeep Park MD;  [facility]     REPLACE GASTROJEJUNOSTOMY TUBE, PERCUTANEOUS N/A 2021    Procedure: GASTROJEJUNOSTOMY TUBE PLACEMENT, PERCUTANEOUS, WITH GASTROPEXY;  Surgeon: Mandeep Park MD;  [facility]     REPLACE GASTROJEJUNOSTOMY TUBE, PERCUTANEOUS N/A 2021    Procedure: REPLACEMENT, GASTROJEJUNOSTOMY TUBE, PERCUTANEOUS;  Surgeon: Zackery Montoay MD;  [facility]     REPLACE GASTROJEJUNOSTOMY TUBE, PERCUTANEOUS N/A 2022    Procedure: REPLACEMENT, GASTROJEJUNOSTOMY TUBE, PERCUTANEOUS;  Surgeon: Mandeep Park MD;  [facility]     REPLACE JEJUNOSTOMY TUBE, PERCUTANEOUS N/A 9/10/2021    Procedure: UPPER ENDOSCOPY, REPLACEMENT OF PERCUTANEOUS GASTROJEJUNOSTOMY TUBE, T-TAG GASTROPEXY;  Surgeon: Zackery Montoya MD;  [facility]     REPLACE JEJUNOSTOMY TUBE, PERCUTANEOUS N/A 2021    Procedure: REPLACEMENT, JEJUNOSTOMY TUBE, PERCUTANEOUS;  Surgeon: Mandeep Park MD;  [facility]     transphenoidal pituitary resection       ZZC  DELIVERY ONLY       ZZC  DELIVERY ONLY      repeat c section with incidental cystotomy with repair     ZZC EXCIS PITUITARY,TRANSNASAL/SEPTAL      pituitary tumor removed for diabetes insipidus     ZZC TOTAL ABDOM HYSTERECTOMY      w/ bilateral salpingoophorectomy        Family History:  New changes  since last visit:  none  Family History   Problem Relation Age of Onset     Lipids Mother      Hypertension Mother      Thyroid Disease Mother      Depression Mother      Angina Mother      GERD Mother      Skin Cancer Mother      Migraines Mother      Eye Disorder Father         cataract, detached retina     Myocardial Infarction Father 60     Lipids Father      Cerebrovascular Disease Father      Depression Father      Substance Abuse Father      Anesthesia Reaction Father         stroke right after surgery     Cataracts Father      Osteoarthritis Father      Ulcerative Colitis Father      Interpersonal Violence Sister      Coronary Artery Disease Sister         angioplasty     GERD Sister      Substance Abuse Sister      Depression Sister      Thyroid Disease Sister      Eye Disorder Maternal Grandmother         cataract     Thyroid Disease Maternal Grandmother      Diabetes Maternal Grandfather      Eye Disorder Paternal Grandmother         cataract     Eye Disorder Paternal Grandfather         cataract     Diabetes Paternal Grandfather      Eye Disorder Son         ptosis     Depression Son      Anxiety Disorder Son      Heart Disease Paternal Aunt      Diabetes Paternal Aunt      Diabetes Paternal Uncle      Heart Disease Paternal Uncle      Depression Nephew      Anxiety Disorder Nephew      Thyroid Disease Nephew      Diabetes Type 2  Cousin         paternal cousin       Social History:  Social History     Social History Narrative     with 3 children and a dog.  No smoking, etoh or drug use.  Worked as a  for [employer] in [employer] in the past.  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     Physical Exam:  Vitals: /79   Pulse 92   Wt 65.6 kg (144 lb 9.6 oz)   SpO2 98%   BMI 22.65 kg/m    BMI= Body mass index is 22.65 kg/m .  General:  Appearance is  normal, no acute distress  HEENT:  NC/AT, sclera appear white  Neck:  No obvious thyromegaly  CV/Lungs:  Non distressed breathing  Skin:  No apparent rashes  Neuro:  Normal mental status  Psych:  Normal affect          Assessment:  1.  Post pancreatectomy diabetes mellitus, s/p total pancreatectomy and islet autotransplant.  (ICD-10 E89.1)  2.  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   Gastroparesis  4.  Nausea, vomiting      Dinora is a 57 year old with history of chronic pancreatitis who is s/p total pancreatectomy and islet autotransplant.  She has been insulin independent with A1c in goal range.    The major issue in the past year has been hypoglycemia.  With 12 hour feeds and minimal PO intake, she has less hypoglycemia, and less hyper/hypoglycemia episodes c/w reactive hypoglycemia.  However there is still a rare excursion to 200s or low to 50s. She should continue to wear her CGM and take acarbose if eating orally. She does not need acarbose for meals.   Interestingly she notes she is requiring IVF in addition to GJ feeds and that she has a past history of central DI associated with a pituitary tumor that was removed.  We will do a screening for urine osmolality and SG below and may follow up with additional testing if urine is dilute.    Plan:  1.  Changes to current diabetes regimen:  Patient Instructions   1)  For the urine studies, you will want to collect those after you have been off tube feeds, no IVF, not drinking for ~8 hours.    2)  I think the overnight tube feeds will help with the blood sugars at night, and then no change to the daytime plan.        Follow Up:  April 6 (Thursday):  1:00 pm      2.  Frequency of blood sugar checks:  Keep wearing Yandy-- this is much better for Dinora than fingersticks because with fingersticks we miss the really critical data of the post  prandial excursions.    3.  Continue routine follow up for autoislet transplant patients:  Mixed meal test (6 mL/kg BoostHP to max of 360 mL) at 3 months, 6 months, and once a year post transplant.  Hemoglobin A1c levels at these time points and quarterly.    4.  Other issues addressed today:  none    Follow up:  4 mos    Contact me for questions at [phone] or [phone].  Emergency number to reach pediatric endocrinology after hours is [phone].    Dr. Gloria Melissa MD  [facility] [place]  Director of Research, Islet Auto-transplant Program  Phone:  [phone]  Electronically signed: December 1, 2022 at 1:40 PM            Review of the result(s) of each unique test - HbA1c, jim  30 minutes spent on the date of the encounter doing chart review, history and exam, documentation, downloading and reviewing CGM data,  and further activities per the note      Again, thank you for allowing me to participate in the care of your patient.        Sincerely,        Gloria Melissa MD

## 2022-12-01 NOTE — PATIENT INSTRUCTIONS
1)  For the urine studies, you will want to collect those after you have been off tube feeds, no IVF, not drinking for ~8 hours.    2)  I think the overnight tube feeds will help with the blood sugars at night, and then no change to the daytime plan.        Follow Up:  April 6 (Thursday):  1:00 pm

## 2022-12-01 NOTE — PROGRESS NOTES
[facility]  Islet Autotransplant, Diabetes Follow Up    Problem List:  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     Incisional hernia, without obstruction or gangrene     Adhesive capsulitis of shoulder, unspecified laterality     S/P hernia repair     Headache, chronic migraine without aura     Chronic pain syndrome     Iron deficiency anemia     Hypoglycemia     Adult failure to thrive     Abdominal pain, generalized     Gastrostomy tube obstruction (H)     Intra-abdominal abscess (H)     Postprocedural intraabdominal abscess     Acquired absence of spleen     Depressive disorder     Diabetes insipidus (H)     Other specified abnormal immunological findings in serum     Poisoning by drug     Sphincter of Oddi dysfunction     Type 1 diabetes mellitus without complication (H)     Myofascial pain       HPI:  Dinora is a 57 year old female here for follow up oflaparoscopic assisted total pancreatectomy, islet cell autotransplant, splenectomy, gastrojejunostomy tube revision, choledochoduodenostomy, duodenojejunostomy, Vera-Y reconstruction performed on 2016.  At the time of the procedure, the patient received:  Total Islet number: [id number].  Total Islet number/k.  Islet equivalents: [id number]  Islet equivalents/kilogram: 4091    Post-surgical course was complicated by two minor procedures for a retained foreign  body near GJ site in 4/2017 and hernia repair 8/2018, and more recently by gastroparesis.  She has been insulin independent since about 1 year after surgery.    - Gastroparesis is her major issue post TPIAT. Admitted multiple times. GJ was helpful for short time but did not tolerate well.  Has tried multiple other approaches and continues to worsen, nothing offering benefit.  Is on our list to access domperidone once we have an IND to prescribe  - Also has hypoglycemia (prior treatments SGLT2 inhibitor not helpful, acarbose is helpful).     At today's visit,   -- Trial of domperidone was not effective, and her EKG QTc was borderline for continuing so therapy was discontinued  -- GJ tube was replaced late Nov 2022, new type of tube that prevents coiling.  She is feeling great with this, best results she has had from a GJ so far.  Very little pain  -- Not eating much as eating dose cause pain.  -- Has been consolidating feeds from 19h to 12h and will now change these to run overnight, 90 mL/hour x 12 hours.  BG seem to be overall very stable when she is on TF  -- Occasional low and high number, not as much as at past visits with me.  If she does eat she will take 100 mg acarbose.  She also has access to glucagon for mini glucagon dosing which she finds very effective for treating low BG.  -- Sleep is a major issue, up at 1am last night and could not sleep after that except for brief nap from 3a-4a.  This is typical for her and she feels exhausted  --  and multiple family ill after Thanksgiving with influenza.   -- She is requiring IVF in addition to the GJ and feels like she has a fairly high fluid need and urinates high volumes out.  She did have a diagnosis of diabetes insipidus in the distant past associated with a pituitary tumor.      Diabetes history:  Current insulin regimen:  None  Other treatments for hypoglycemia are as described above.     Wearing Yandy              Recent hemoglobin A1c levels:  Lab  Results   Component Value Date    A1C 5.4 2022    A1C 5.2 2021    A1C 5.5 2021    A1C 5.4 2021    A1C 5.7 2021    A1C 5.9 2021    A1C 5.5 2020    A1C 5.8 2020     Hypoglycemia history: mild lows but rapid falls- less of an issue recentl.y. The patient has had 0 episodes of severe hypoglycemia (seizure, coma, or neuroglycopenic symptoms severe enough to require assistance from another person).  Blood sugars were reviewed from the patient records and/or the meter download.          Review of systems:  A complete ROS is negative except as noted in HPI above.  With her gastroparesis, she does have vomiting and bloating.  She is stopping amitiza and mag citrate and starting a new motility med.    Past Medical and Surgical History:  Past Medical History:   Diagnosis Date     Allergic rhinitis, cause unspecified      Allergy to other foods     strawberries, apples, celeries, alice, watermelon     Arthritis     left knee     Choledocholithiasis     long after cholecystectomy     Chronic abdominal pain      Chronic constipation      Chronic nausea      Chronic pancreatitis (H)      Degeneration of lumbar or lumbosacral intervertebral disc      Esophageal reflux     w/ hiatal hernia     Gastroparesis      Hiatal hernia      History of pituitary adenoma     s/p resection     Hypothyroidism      Migraines      Mild hyperlipidemia      On tube feeding diet     presence of GJ tube     Pancreatic disease     PD stricture, 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 endometriosis growth     APPENDECTOMY        SECTION       COLONOSCOPY       ENDOSCOPIC INSERTION TUBE  GASTROSTOMY N/A 11/28/2021    Procedure: Gastrojejonostomy placement with jejunopexy, PEG tube exchange;  Surgeon: Zackery Montoya MD;  [facility]     ENDOSCOPIC RETROGRADE CHOLANGIOPANCREATOGRAM N/A 6/2/2015    Procedure: ENDOSCOPIC RETROGRADE CHOLANGIOPANCREATOGRAM;  Surgeon: Mandeep Park MD;  [facility]     ENDOSCOPIC RETROGRADE CHOLANGIOPANCREATOGRAM N/A 2/9/2016    Procedure: COMBINED ENDOSCOPIC RETROGRADE CHOLANGIOPANCREATOGRAPHY, PLACE TUBE/STENT;  Surgeon: Mandeep Park MD;  [facility]     ENDOSCOPIC RETROGRADE CHOLANGIOPANCREATOGRAM N/A 3/17/2016    Procedure: COMBINED ENDOSCOPIC RETROGRADE CHOLANGIOPANCREATOGRAPHY, REMOVE FOREIGN BODY OR STENT/TUBE;  Surgeon: Mandeep Park MD;  [facility]     ENDOSCOPIC RETROGRADE CHOLANGIOPANCREATOGRAM N/A 8/2/2016    Procedure: COMBINED ENDOSCOPIC RETROGRADE CHOLANGIOPANCREATOGRAPHY, PLACE TUBE/STENT;  Surgeon: Mandeep Park MD;  [facility]     ENDOSCOPIC RETROGRADE CHOLANGIOPANCREATOGRAM N/A 8/26/2016    Procedure: COMBINED ENDOSCOPIC RETROGRADE CHOLANGIOPANCREATOGRAPHY, REMOVE FOREIGN BODY OR STENT/TUBE;  Surgeon: Mandeep Park MD;  [facility]     ENDOSCOPIC ULTRASOUND UPPER GASTROINTESTINAL TRACT (GI) N/A 10/3/2016    Procedure: ENDOSCOPIC ULTRASOUND, ESOPHAGOSCOPY / UPPER GASTROINTESTINAL TRACT (GI);  Surgeon: Guru Jose Rodas MD;  [facility]     ENTEROSCOPY SMALL BOWEL N/A 2/3/2022    Procedure: ENTEROSCOPY with possible fistula closure;  Surgeon: Francisco Dodson MD;  Location:  GI     ESOPHAGOSCOPY, GASTROSCOPY, DUODENOSCOPY (EGD), COMBINED N/A 6/24/2015    Procedure: COMBINED ESOPHAGOSCOPY, GASTROSCOPY, DUODENOSCOPY (EGD), REMOVE FOREIGN BODY;  Surgeon: Mandeep Park MD;  [facility]     ESOPHAGOSCOPY, GASTROSCOPY, DUODENOSCOPY (EGD), COMBINED N/A 10/25/2015    Procedure: COMBINED ESOPHAGOSCOPY, GASTROSCOPY, DUODENOSCOPY (EGD);  Surgeon:  Sammy Amaro MD;  [facility]     ESOPHAGOSCOPY, GASTROSCOPY, DUODENOSCOPY (EGD), COMBINED N/A 10/25/2015    Procedure: COMBINED ESOPHAGOSCOPY, GASTROSCOPY, DUODENOSCOPY (EGD), BIOPSY SINGLE OR MULTIPLE;  Surgeon: Sammy Amaro MD;  [facility]     ESOPHAGOSCOPY, GASTROSCOPY, DUODENOSCOPY (EGD), DILATATION, COMBINED       EXCISE LESION TRUNK N/A 4/17/2017    Procedure: EXCISE LESION TRUNK;  Removal of Abdominal Foreign Body;  Surgeon: Nestor Phoenix MD;  [facility]     HC ESOPH/GAS REFLUX TEST W NASAL IMPED >1 HR N/A 11/19/2015    Procedure: ESOPHAGEAL IMPEDENCE FUNCTION TEST WITH 24 HOUR PH GREATER THAN 1 HOUR;  Surgeon: Thiago Apple MD;  [facility] GI     HC UGI ENDOSCOPY DIAG W BIOPSY  9/17/08     HC UGI ENDOSCOPY DIAG W BIOPSY  9/27/12     HC UGI ENDOSCOPY W ESOPHAGEAL DILATION BALLOON <30MM  9/17/08     HC UGI ENDOSCOPY W EUS N/A 5/5/2015    Procedure: COMBINED ENDOSCOPIC ULTRASOUND, ESOPHAGOSCOPY, GASTROSCOPY, DUODENOSCOPY (EGD);  Surgeon: Wm Dueñas MD;  [facility]     HC WRIST ARTHROSCOP,RELEASE XVERS LIG Bilateral 12/17/08     INJECT TRANSVERSUS ABDOMINIS PLANE (TAP) BLOCK BILATERAL Left 9/22/2016    Procedure: INJECT TRANSVERSUS ABDOMINIS PLANE (TAP) BLOCK BILATERAL;  Surgeon: Dickson Corrigan MD;  [facility]     INJECT TRIGGER POINT Bilateral 9/8/2022    Procedure: Abdominal trigger point injection with ultrasound;  Surgeon: Monika Mahajan MD;  [facility]     INJECT TRIGGER POINT SINGLE / MULTIPLE 3 OR MORE MUSCLES Right 11/15/2022    Procedure: Trigger point injections right abdomen with ultrasound guidance;  Surgeon: Monika Mahajan MD;  Location: [facility] OR     IR CHEST PORT PLACEMENT < 5 YRS OF AGE  6/10/2022     laparoscopic pineda  1995     LAPAROSCOPIC HERNIORRHAPHY INCISIONAL N/A 8/23/2018    Procedure: LAPAROSCOPIC HERNIORRHAPHY INCISIONAL;  Laparoscopic Incisional Hernia Repair with Symbotex Mesh Implant;  Surgeon: Nestor Phoenix  MD;  [facility]     LAPAROSCOPIC PANCREATECTOMY, TRANSPLANT AUTO ISLET CELL N/A 2016    Procedure: LAPAROSCOPIC PANCREATECTOMY, TRANSPLANT AUTO ISLET CELL;  Surgeon: Nestor Phoenix MD;  [facility]     REMOVE GASTROSTOMY TUBE ADULT N/A 2022    Procedure: REMOVAL, GASTROSTOMY TUBE, ADULT;  Surgeon: Mandeep Park MD;  [facility]     REPLACE GASTROJEJUNOSTOMY TUBE, PERCUTANEOUS N/A 2021    Procedure: GASTROJEJUNOSTOMY TUBE PLACEMENT, PERCUTANEOUS, WITH GASTROPEXY;  Surgeon: Mandeep Park MD;  [facility]     REPLACE GASTROJEJUNOSTOMY TUBE, PERCUTANEOUS N/A 2021    Procedure: REPLACEMENT, GASTROJEJUNOSTOMY TUBE, PERCUTANEOUS;  Surgeon: Zackery Montoya MD;  [facility]     REPLACE GASTROJEJUNOSTOMY TUBE, PERCUTANEOUS N/A 2022    Procedure: REPLACEMENT, GASTROJEJUNOSTOMY TUBE, PERCUTANEOUS;  Surgeon: Mandeep Park MD;  [facility]     REPLACE JEJUNOSTOMY TUBE, PERCUTANEOUS N/A 9/10/2021    Procedure: UPPER ENDOSCOPY, REPLACEMENT OF PERCUTANEOUS GASTROJEJUNOSTOMY TUBE, T-TAG GASTROPEXY;  Surgeon: Zackery Montoya MD;  [facility]     REPLACE JEJUNOSTOMY TUBE, PERCUTANEOUS N/A 2021    Procedure: REPLACEMENT, JEJUNOSTOMY TUBE, PERCUTANEOUS;  Surgeon: Mandeep Park MD;  [facility]     transphenoidal pituitary resection       Z  DELIVERY ONLY       Z  DELIVERY ONLY      repeat c section with incidental cystotomy with repair     ZZ EXCIS PITUITARY,TRANSNASAL/SEPTAL      pituitary tumor removed for diabetes insipidus     [facility] TOTAL ABDOM HYSTERECTOMY      w/ bilateral salpingoophorectomy        Family History:  New changes since last visit:  none  Family History   Problem Relation Age of Onset     Lipids Mother      Hypertension Mother      Thyroid Disease Mother      Depression Mother      Angina Mother      GERD Mother      Skin Cancer Mother      Migraines Mother      Eye  Disorder Father         cataract, detached retina     Myocardial Infarction Father 60     Lipids Father      Cerebrovascular Disease Father      Depression Father      Substance Abuse Father      Anesthesia Reaction Father         stroke right after surgery     Cataracts Father      Osteoarthritis Father      Ulcerative Colitis Father      Interpersonal Violence Sister      Coronary Artery Disease Sister         angioplasty     GERD Sister      Substance Abuse Sister      Depression Sister      Thyroid Disease Sister      Eye Disorder Maternal Grandmother         cataract     Thyroid Disease Maternal Grandmother      Diabetes Maternal Grandfather      Eye Disorder Paternal Grandmother         cataract     Eye Disorder Paternal Grandfather         cataract     Diabetes Paternal Grandfather      Eye Disorder Son         ptosis     Depression Son      Anxiety Disorder Son      Heart Disease Paternal Aunt      Diabetes Paternal Aunt      Diabetes Paternal Uncle      Heart Disease Paternal Uncle      Depression Nephew      Anxiety Disorder Nephew      Thyroid Disease Nephew      Diabetes Type 2  Cousin         paternal cousin       Social History:  Social History     Social History Narrative     with 3 children and a dog.  No smoking, etoh or drug use.  Worked as a  for [employer] in [place] in the past.  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     Physical Exam:  Vitals: /79   Pulse 92   Wt 65.6 kg (144 lb 9.6 oz)   SpO2 98%   BMI 22.65 kg/m    BMI= Body mass index is 22.65 kg/m .  General:  Appearance is normal, no acute distress  HEENT:  NC/AT, sclera appear white  Neck:  No obvious thyromegaly  CV/Lungs:  Non distressed breathing  Skin:  No apparent rashes  Neuro:  Normal mental status  Psych:  Normal affect          Assessment:  1.  Post pancreatectomy  diabetes mellitus, s/p total pancreatectomy and islet autotransplant.  (ICD-10 E89.1)  2.  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   Gastroparesis  4.  Nausea, vomiting      Dinora is a 57 year old with history of chronic pancreatitis who is s/p total pancreatectomy and islet autotransplant.  She has been insulin independent with A1c in goal range.    The major issue in the past year has been hypoglycemia.  With 12 hour feeds and minimal PO intake, she has less hypoglycemia, and less hyper/hypoglycemia episodes c/w reactive hypoglycemia.  However there is still a rare excursion to 200s or low to 50s. She should continue to wear her CGM and take acarbose if eating orally. She does not need acarbose for meals.   Interestingly she notes she is requiring IVF in addition to GJ feeds and that she has a past history of central DI associated with a pituitary tumor that was removed.  We will do a screening for urine osmolality and SG below and may follow up with additional testing if urine is dilute.    Plan:  1.  Changes to current diabetes regimen:  Patient Instructions   1)  For the urine studies, you will want to collect those after you have been off tube feeds, no IVF, not drinking for ~8 hours.    2)  I think the overnight tube feeds will help with the blood sugars at night, and then no change to the daytime plan.        Follow Up:  April 6 (Thursday):  1:00 pm      2.  Frequency of blood sugar checks:  Keep wearing Yandy-- this is much better for Dinora than fingersticks because with fingersticks we miss the really critical data of the post prandial excursions.    3.  Continue routine follow up for autoislet transplant patients:  Mixed meal test (6 mL/kg BoostHP to max of 360 mL) at 3 months, 6 months, and once a year post transplant.  Hemoglobin A1c levels at these time points and  quarterly.    4.  Other issues addressed today:  none    Follow up:  4 mos    Contact me for questions at [phone] or [phone].  Emergency number to reach pediatric endocrinology after hours is [phone].    Dr. Gloria Melissa MD  [facility]  Director of Research, Islet Auto-transplant Program  Phone:  [phone]  Electronically signed: December 1, 2022 at 1:40 PM            Review of the result(s) of each unique test - HbA1c, jim  30 minutes spent on the date of the encounter doing chart review, history and exam, documentation, downloading and reviewing CGM data,  and further activities per the note

## 2022-12-05 ENCOUNTER — DOCUMENTATION ONLY (OUTPATIENT)
Dept: GASTROENTEROLOGY | Facility: CLINIC | Age: 57
End: 2022-12-05

## 2022-12-05 ENCOUNTER — ANCILLARY PROCEDURE (OUTPATIENT)
Dept: GENERAL RADIOLOGY | Facility: CLINIC | Age: 57
End: 2022-12-05
Attending: INTERNAL MEDICINE
Payer: COMMERCIAL

## 2022-12-05 ENCOUNTER — PATIENT OUTREACH (OUTPATIENT)
Dept: GASTROENTEROLOGY | Facility: CLINIC | Age: 57
End: 2022-12-05

## 2022-12-05 DIAGNOSIS — Z46.59 ENCOUNTER FOR CARE RELATED TO FEEDING TUBE: Primary | ICD-10-CM

## 2022-12-05 DIAGNOSIS — Z46.59 ENCOUNTER FOR CARE RELATED TO FEEDING TUBE: ICD-10-CM

## 2022-12-05 PROCEDURE — 74019 RADEX ABDOMEN 2 VIEWS: CPT | Mod: GC | Performed by: RADIOLOGY

## 2022-12-05 NOTE — PROGRESS NOTES
Faxed Xray orders to PT's PCP.     Requested that they call PT ASAP to schedule images. Also requested that they push completed images to [facility] and fax report back.    Provided Fax Number and mailing address.    PCP:   [facility]  Dr. Rossi Andrade  Fax: [phone]     SK

## 2022-12-05 NOTE — TELEPHONE ENCOUNTER
"PT called in, trouble with feeding tube.\" Feels like formula is backing up\", some increased pain. Triage sounds like J may have flipped into the stomach. After flushes, pt can taste feeds.  Xray ordered, message sent to Dr. Park  For update.    Left message for patient with imaging scheduling number.    Wife called back, having flu-like symptoms, covid negative. Asked that she mask up and get imaging done, asked that we send xray orders locally. Will fax to     ML  "

## 2022-12-05 NOTE — PROGRESS NOTES
Re-faxed Xray orders to PT's PCP, this time to [phone].      Requested that they call PT ASAP to schedule images. Also requested that they push completed images to [email or web address] PACS and fax report back.     Provided Fax Number and mailing address.     PCP:   [facility]  Dr. Rossi Andrade     SK

## 2022-12-06 ENCOUNTER — PREP FOR PROCEDURE (OUTPATIENT)
Dept: GASTROENTEROLOGY | Facility: CLINIC | Age: 57
End: 2022-12-06

## 2022-12-06 DIAGNOSIS — K31.84 GASTROPARESIS: Primary | ICD-10-CM

## 2022-12-07 ENCOUNTER — PATIENT OUTREACH (OUTPATIENT)
Dept: GASTROENTEROLOGY | Facility: CLINIC | Age: 57
End: 2022-12-07

## 2022-12-07 NOTE — TELEPHONE ENCOUNTER
Called [facility] to give verbal orders to pharmacy so they can fill Reliazorb. They have verbal order on file.   Case called into OR for 12/9, called patient with update. Viral symptoms are getting better, pt afebrile. Will do rapid covid test tomorrow. Pt knows plan.    ML

## 2022-12-09 ENCOUNTER — ANESTHESIA EVENT (OUTPATIENT)
Dept: SURGERY | Facility: CLINIC | Age: 57
End: 2022-12-09
Payer: COMMERCIAL

## 2022-12-09 ENCOUNTER — HOSPITAL ENCOUNTER (OUTPATIENT)
Facility: CLINIC | Age: 57
Discharge: HOME | End: 2022-12-09
Attending: INTERNAL MEDICINE | Admitting: INTERNAL MEDICINE
Payer: COMMERCIAL

## 2022-12-09 ENCOUNTER — APPOINTMENT (OUTPATIENT)
Dept: GENERAL RADIOLOGY | Facility: CLINIC | Age: 57
End: 2022-12-09
Attending: INTERNAL MEDICINE
Payer: COMMERCIAL

## 2022-12-09 ENCOUNTER — ANESTHESIA (OUTPATIENT)
Dept: SURGERY | Facility: CLINIC | Age: 57
End: 2022-12-09
Payer: COMMERCIAL

## 2022-12-09 VITALS
WEIGHT: 145.28 LBS | BODY MASS INDEX: 22.02 KG/M2 | SYSTOLIC BLOOD PRESSURE: 122 MMHG | HEIGHT: 68 IN | TEMPERATURE: 97.1 F | OXYGEN SATURATION: 97 % | HEART RATE: 69 BPM | DIASTOLIC BLOOD PRESSURE: 80 MMHG | RESPIRATION RATE: 14 BRPM

## 2022-12-09 DIAGNOSIS — Z71.89 ACP (ADVANCE CARE PLANNING): Chronic | ICD-10-CM

## 2022-12-09 DIAGNOSIS — Z90.410 POST-PANCREATECTOMY DIABETES (H): ICD-10-CM

## 2022-12-09 DIAGNOSIS — R76.8 IGG4 SELECTIVELY HIGH IN PLASMA: ICD-10-CM

## 2022-12-09 DIAGNOSIS — K65.1 POSTPROCEDURAL INTRAABDOMINAL ABSCESS (H): ICD-10-CM

## 2022-12-09 DIAGNOSIS — K43.2 INCISIONAL HERNIA, WITHOUT OBSTRUCTION OR GANGRENE: ICD-10-CM

## 2022-12-09 DIAGNOSIS — K86.89 PANCREATIC INSUFFICIENCY: ICD-10-CM

## 2022-12-09 DIAGNOSIS — K31.84 GASTROPARESIS: ICD-10-CM

## 2022-12-09 DIAGNOSIS — T81.43XA POSTPROCEDURAL INTRAABDOMINAL ABSCESS (H): ICD-10-CM

## 2022-12-09 DIAGNOSIS — K94.23 GASTROSTOMY TUBE OBSTRUCTION (H): ICD-10-CM

## 2022-12-09 DIAGNOSIS — K83.4 SPHINCTER OF ODDI DYSFUNCTION: ICD-10-CM

## 2022-12-09 DIAGNOSIS — R76.8 OTHER SPECIFIED ABNORMAL IMMUNOLOGICAL FINDINGS IN SERUM: ICD-10-CM

## 2022-12-09 DIAGNOSIS — E13.9 POST-PANCREATECTOMY DIABETES (H): ICD-10-CM

## 2022-12-09 DIAGNOSIS — E10.9 TYPE 1 DIABETES MELLITUS WITHOUT COMPLICATION (H): ICD-10-CM

## 2022-12-09 DIAGNOSIS — E23.2 DIABETES INSIPIDUS (H): ICD-10-CM

## 2022-12-09 DIAGNOSIS — Z90.81 ACQUIRED ABSENCE OF SPLEEN: ICD-10-CM

## 2022-12-09 DIAGNOSIS — M79.18 MYOFASCIAL PAIN: Primary | ICD-10-CM

## 2022-12-09 DIAGNOSIS — E16.2 HYPOGLYCEMIA: ICD-10-CM

## 2022-12-09 DIAGNOSIS — G89.4 CHRONIC PAIN SYNDROME: ICD-10-CM

## 2022-12-09 DIAGNOSIS — Z87.19 S/P HERNIA REPAIR: ICD-10-CM

## 2022-12-09 DIAGNOSIS — M75.00 ADHESIVE CAPSULITIS OF SHOULDER, UNSPECIFIED LATERALITY: ICD-10-CM

## 2022-12-09 DIAGNOSIS — E89.1 POST-PANCREATECTOMY DIABETES (H): ICD-10-CM

## 2022-12-09 DIAGNOSIS — R10.84 ABDOMINAL PAIN, GENERALIZED: ICD-10-CM

## 2022-12-09 DIAGNOSIS — K65.1 INTRA-ABDOMINAL ABSCESS (H): ICD-10-CM

## 2022-12-09 DIAGNOSIS — F32.A DEPRESSIVE DISORDER: ICD-10-CM

## 2022-12-09 DIAGNOSIS — Z98.890 S/P HERNIA REPAIR: ICD-10-CM

## 2022-12-09 DIAGNOSIS — Z29.89 NEED FOR PROPHYLACTIC IMMUNOTHERAPY: ICD-10-CM

## 2022-12-09 DIAGNOSIS — Z98.890 S/P ERCP: ICD-10-CM

## 2022-12-09 DIAGNOSIS — Z98.890 HISTORY OF ERCP: ICD-10-CM

## 2022-12-09 DIAGNOSIS — R62.7 ADULT FAILURE TO THRIVE: ICD-10-CM

## 2022-12-09 LAB
GLUCOSE BLDC GLUCOMTR-MCNC: 101 MG/DL (ref 70–99)
GLUCOSE BLDC GLUCOMTR-MCNC: 86 MG/DL (ref 70–99)

## 2022-12-09 PROCEDURE — 255N000002 HC RX 255 OP 636: Performed by: INTERNAL MEDICINE

## 2022-12-09 PROCEDURE — 250N000013 HC RX MED GY IP 250 OP 250 PS 637: Performed by: INTERNAL MEDICINE

## 2022-12-09 PROCEDURE — 250N000011 HC RX IP 250 OP 636: Performed by: NURSE ANESTHETIST, CERTIFIED REGISTERED

## 2022-12-09 PROCEDURE — 250N000011 HC RX IP 250 OP 636: Performed by: INTERNAL MEDICINE

## 2022-12-09 PROCEDURE — 272N000001 HC OR GENERAL SUPPLY STERILE: Performed by: INTERNAL MEDICINE

## 2022-12-09 PROCEDURE — 710N000010 HC RECOVERY PHASE 1, LEVEL 2, PER MIN: Performed by: INTERNAL MEDICINE

## 2022-12-09 PROCEDURE — 250N000009 HC RX 250: Performed by: ANESTHESIOLOGY

## 2022-12-09 PROCEDURE — 999N000179 XR SURGERY CARM FLUORO LESS THAN 5 MIN W STILLS: Mod: TC

## 2022-12-09 PROCEDURE — 258N000003 HC RX IP 258 OP 636: Performed by: NURSE ANESTHETIST, CERTIFIED REGISTERED

## 2022-12-09 PROCEDURE — 250N000011 HC RX IP 250 OP 636: Performed by: ANESTHESIOLOGY

## 2022-12-09 PROCEDURE — 272N000002 HC OR SUPPLY OTHER OPNP: Performed by: INTERNAL MEDICINE

## 2022-12-09 PROCEDURE — 360N000082 HC SURGERY LEVEL 2 W/ FLUORO, PER MIN: Performed by: INTERNAL MEDICINE

## 2022-12-09 PROCEDURE — 250N000009 HC RX 250: Performed by: INTERNAL MEDICINE

## 2022-12-09 PROCEDURE — 999N000141 HC STATISTIC PRE-PROCEDURE NURSING ASSESSMENT: Performed by: INTERNAL MEDICINE

## 2022-12-09 PROCEDURE — 370N000017 HC ANESTHESIA TECHNICAL FEE, PER MIN: Performed by: INTERNAL MEDICINE

## 2022-12-09 PROCEDURE — 710N000012 HC RECOVERY PHASE 2, PER MINUTE: Performed by: INTERNAL MEDICINE

## 2022-12-09 PROCEDURE — 82962 GLUCOSE BLOOD TEST: CPT

## 2022-12-09 PROCEDURE — 250N000009 HC RX 250: Performed by: NURSE ANESTHETIST, CERTIFIED REGISTERED

## 2022-12-09 RX ORDER — IOPAMIDOL 510 MG/ML
INJECTION, SOLUTION INTRAVASCULAR PRN
Status: DISCONTINUED | OUTPATIENT
Start: 2022-12-09 | End: 2022-12-09 | Stop reason: HOSPADM

## 2022-12-09 RX ORDER — NALOXONE HYDROCHLORIDE 0.4 MG/ML
0.2 INJECTION, SOLUTION INTRAMUSCULAR; INTRAVENOUS; SUBCUTANEOUS
Status: DISCONTINUED | OUTPATIENT
Start: 2022-12-09 | End: 2022-12-09 | Stop reason: HOSPADM

## 2022-12-09 RX ORDER — FLUMAZENIL 0.1 MG/ML
0.2 INJECTION, SOLUTION INTRAVENOUS
Status: DISCONTINUED | OUTPATIENT
Start: 2022-12-09 | End: 2022-12-09 | Stop reason: HOSPADM

## 2022-12-09 RX ORDER — NALOXONE HYDROCHLORIDE 0.4 MG/ML
0.4 INJECTION, SOLUTION INTRAMUSCULAR; INTRAVENOUS; SUBCUTANEOUS
Status: DISCONTINUED | OUTPATIENT
Start: 2022-12-09 | End: 2022-12-09 | Stop reason: HOSPADM

## 2022-12-09 RX ORDER — HYDROMORPHONE HYDROCHLORIDE 2 MG/1
2 TABLET ORAL EVERY 6 HOURS PRN
Qty: 12 TABLET | Refills: 0 | Status: SHIPPED | OUTPATIENT
Start: 2022-12-09 | End: 2022-12-12

## 2022-12-09 RX ORDER — PROPOFOL 10 MG/ML
INJECTION, EMULSION INTRAVENOUS PRN
Status: DISCONTINUED | OUTPATIENT
Start: 2022-12-09 | End: 2022-12-09

## 2022-12-09 RX ORDER — SODIUM CHLORIDE, SODIUM LACTATE, POTASSIUM CHLORIDE, CALCIUM CHLORIDE 600; 310; 30; 20 MG/100ML; MG/100ML; MG/100ML; MG/100ML
INJECTION, SOLUTION INTRAVENOUS CONTINUOUS
Status: DISCONTINUED | OUTPATIENT
Start: 2022-12-09 | End: 2022-12-09 | Stop reason: HOSPADM

## 2022-12-09 RX ORDER — PROPOFOL 10 MG/ML
INJECTION, EMULSION INTRAVENOUS CONTINUOUS PRN
Status: DISCONTINUED | OUTPATIENT
Start: 2022-12-09 | End: 2022-12-09

## 2022-12-09 RX ORDER — FENTANYL CITRATE 50 UG/ML
25 INJECTION, SOLUTION INTRAMUSCULAR; INTRAVENOUS
Status: DISCONTINUED | OUTPATIENT
Start: 2022-12-09 | End: 2022-12-09 | Stop reason: HOSPADM

## 2022-12-09 RX ORDER — DIPHENHYDRAMINE HYDROCHLORIDE 50 MG/ML
INJECTION INTRAMUSCULAR; INTRAVENOUS PRN
Status: DISCONTINUED | OUTPATIENT
Start: 2022-12-09 | End: 2022-12-09

## 2022-12-09 RX ORDER — ONDANSETRON 4 MG/1
4 TABLET, ORALLY DISINTEGRATING ORAL EVERY 30 MIN PRN
Status: DISCONTINUED | OUTPATIENT
Start: 2022-12-09 | End: 2022-12-09 | Stop reason: HOSPADM

## 2022-12-09 RX ORDER — LIDOCAINE 40 MG/G
CREAM TOPICAL
Status: DISCONTINUED | OUTPATIENT
Start: 2022-12-09 | End: 2022-12-09 | Stop reason: HOSPADM

## 2022-12-09 RX ORDER — DIPHENHYDRAMINE HCL 25 MG
25 TABLET ORAL EVERY 6 HOURS PRN
Status: DISCONTINUED | OUTPATIENT
Start: 2022-12-09 | End: 2022-12-09 | Stop reason: HOSPADM

## 2022-12-09 RX ORDER — HYDROMORPHONE HCL IN WATER/PF 6 MG/30 ML
0.4 PATIENT CONTROLLED ANALGESIA SYRINGE INTRAVENOUS ONCE
Status: COMPLETED | OUTPATIENT
Start: 2022-12-09 | End: 2022-12-09

## 2022-12-09 RX ORDER — LIDOCAINE HYDROCHLORIDE 10 MG/ML
INJECTION, SOLUTION INFILTRATION; PERINEURAL PRN
Status: DISCONTINUED | OUTPATIENT
Start: 2022-12-09 | End: 2022-12-09

## 2022-12-09 RX ORDER — HYDROMORPHONE HYDROCHLORIDE 1 MG/ML
0.2 INJECTION, SOLUTION INTRAMUSCULAR; INTRAVENOUS; SUBCUTANEOUS EVERY 5 MIN PRN
Status: DISCONTINUED | OUTPATIENT
Start: 2022-12-09 | End: 2022-12-09 | Stop reason: HOSPADM

## 2022-12-09 RX ORDER — MEPERIDINE HYDROCHLORIDE 25 MG/ML
12.5 INJECTION INTRAMUSCULAR; INTRAVENOUS; SUBCUTANEOUS
Status: DISCONTINUED | OUTPATIENT
Start: 2022-12-09 | End: 2022-12-09 | Stop reason: HOSPADM

## 2022-12-09 RX ORDER — ONDANSETRON 2 MG/ML
4 INJECTION INTRAMUSCULAR; INTRAVENOUS EVERY 30 MIN PRN
Status: DISCONTINUED | OUTPATIENT
Start: 2022-12-09 | End: 2022-12-09 | Stop reason: HOSPADM

## 2022-12-09 RX ORDER — SODIUM CHLORIDE, SODIUM LACTATE, POTASSIUM CHLORIDE, CALCIUM CHLORIDE 600; 310; 30; 20 MG/100ML; MG/100ML; MG/100ML; MG/100ML
INJECTION, SOLUTION INTRAVENOUS CONTINUOUS PRN
Status: DISCONTINUED | OUTPATIENT
Start: 2022-12-09 | End: 2022-12-09

## 2022-12-09 RX ORDER — FENTANYL CITRATE 50 UG/ML
25 INJECTION, SOLUTION INTRAMUSCULAR; INTRAVENOUS EVERY 5 MIN PRN
Status: DISCONTINUED | OUTPATIENT
Start: 2022-12-09 | End: 2022-12-09 | Stop reason: HOSPADM

## 2022-12-09 RX ADMIN — MIDAZOLAM 1 MG: 1 INJECTION INTRAMUSCULAR; INTRAVENOUS at 09:38

## 2022-12-09 RX ADMIN — DIPHENHYDRAMINE HYDROCHLORIDE 25 MG: 25 CAPSULE ORAL at 15:53

## 2022-12-09 RX ADMIN — SODIUM CHLORIDE, POTASSIUM CHLORIDE, SODIUM LACTATE AND CALCIUM CHLORIDE: 600; 310; 30; 20 INJECTION, SOLUTION INTRAVENOUS at 09:47

## 2022-12-09 RX ADMIN — Medication 40 MG: at 09:38

## 2022-12-09 RX ADMIN — SUGAMMADEX 200 MG: 100 INJECTION, SOLUTION INTRAVENOUS at 11:44

## 2022-12-09 RX ADMIN — DIPHENHYDRAMINE HYDROCHLORIDE 25 MG: 50 INJECTION, SOLUTION INTRAMUSCULAR; INTRAVENOUS at 09:38

## 2022-12-09 RX ADMIN — FENTANYL CITRATE 25 MCG: 50 INJECTION INTRAMUSCULAR; INTRAVENOUS at 12:25

## 2022-12-09 RX ADMIN — FENTANYL CITRATE 25 MCG: 50 INJECTION INTRAMUSCULAR; INTRAVENOUS at 12:59

## 2022-12-09 RX ADMIN — PROMETHAZINE HYDROCHLORIDE 25 MG: 25 INJECTION INTRAMUSCULAR; INTRAVENOUS at 09:54

## 2022-12-09 RX ADMIN — HYDROMORPHONE HYDROCHLORIDE 0.2 MG: 1 INJECTION, SOLUTION INTRAMUSCULAR; INTRAVENOUS; SUBCUTANEOUS at 14:24

## 2022-12-09 RX ADMIN — FENTANYL CITRATE 25 MCG: 50 INJECTION INTRAMUSCULAR; INTRAVENOUS at 13:23

## 2022-12-09 RX ADMIN — FENTANYL CITRATE 25 MCG: 50 INJECTION INTRAMUSCULAR; INTRAVENOUS at 13:12

## 2022-12-09 RX ADMIN — PROPOFOL 150 MCG/KG/MIN: 10 INJECTION, EMULSION INTRAVENOUS at 09:58

## 2022-12-09 RX ADMIN — HYDROMORPHONE HYDROCHLORIDE 0.2 MG: 1 INJECTION, SOLUTION INTRAMUSCULAR; INTRAVENOUS; SUBCUTANEOUS at 14:02

## 2022-12-09 RX ADMIN — MIDAZOLAM 1 MG: 1 INJECTION INTRAMUSCULAR; INTRAVENOUS at 09:51

## 2022-12-09 RX ADMIN — LIDOCAINE HYDROCHLORIDE 100 MG: 10 INJECTION, SOLUTION INFILTRATION; PERINEURAL at 09:38

## 2022-12-09 RX ADMIN — HYDROMORPHONE HYDROCHLORIDE 0.4 MG: 0.2 INJECTION, SOLUTION INTRAMUSCULAR; INTRAVENOUS; SUBCUTANEOUS at 15:53

## 2022-12-09 RX ADMIN — PROPOFOL 100 MG: 10 INJECTION, EMULSION INTRAVENOUS at 09:38

## 2022-12-09 ASSESSMENT — ACTIVITIES OF DAILY LIVING (ADL)
ADLS_ACUITY_SCORE: 39

## 2022-12-09 ASSESSMENT — LIFESTYLE VARIABLES: TOBACCO_USE: 1

## 2022-12-09 ASSESSMENT — ENCOUNTER SYMPTOMS: SEIZURES: 0

## 2022-12-09 NOTE — ANESTHESIA POSTPROCEDURE EVALUATION
Patient: Dinora Mcghee    Procedure: Procedure(s):  REPLACEMENT, GASTROJEJUNOSTOMY TUBE, PERCUTANEOUS with ENDOSCOPIC SUTURING.       Anesthesia Type:  General    Note:  Disposition: Outpatient   Postop Pain Control: Uneventful            Sign Out: Well controlled pain   PONV: No   Neuro/Psych: Uneventful            Sign Out: Acceptable/Baseline neuro status   Airway/Respiratory:    CV/Hemodynamics: Uneventful            Sign Out: Acceptable CV status; No obvious hypovolemia; No obvious fluid overload   Other NRE: NONE   DID A NON-ROUTINE EVENT OCCUR? No           Last vitals:  Vitals Value Taken Time   BP     Temp     Pulse     Resp     SpO2         Electronically Signed By: Yusef Cavanaugh MD  December 9, 2022  12:14 PM

## 2022-12-09 NOTE — ANESTHESIA PREPROCEDURE EVALUATION
Anesthesia Pre-Procedure Evaluation    Patient: Dinora Mcghee   MRN: [id number] : 1965        Procedure : Procedure(s):  REPLACEMENT, GASTROJEJUNOSTOMY TUBE, PERCUTANEOUS          Past Medical History:   Diagnosis Date     Allergic rhinitis, cause unspecified      Allergy to other foods     strawberries, apples, celeries, alice, watermelon     Arthritis     left knee     Choledocholithiasis     long after cholecystectomy     Chronic abdominal pain      Chronic constipation      Chronic nausea      Chronic pancreatitis (H)      Degeneration of lumbar or lumbosacral intervertebral disc      Esophageal reflux     w/ hiatal hernia     Gastroparesis      Hiatal hernia      History of pituitary adenoma     s/p resection     Hypothyroidism      Migraines      Mild hyperlipidemia      On tube feeding diet     presence of GJ tube     Pancreatic disease     PD stricture, 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 endometriosis growth     APPENDECTOMY        SECTION       COLONOSCOPY       ENDOSCOPIC INSERTION TUBE GASTROSTOMY N/A 2021    Procedure: Gastrojejonostomy placement with jejunopexy, PEG tube exchange;  Surgeon: Zackery Montoya MD;  [facility]     ENDOSCOPIC RETROGRADE CHOLANGIOPANCREATOGRAM N/A 2015    Procedure: ENDOSCOPIC RETROGRADE CHOLANGIOPANCREATOGRAM;  Surgeon: Mandeep Park MD;  [facility]     ENDOSCOPIC RETROGRADE CHOLANGIOPANCREATOGRAM N/A 2016    Procedure: COMBINED ENDOSCOPIC RETROGRADE CHOLANGIOPANCREATOGRAPHY, PLACE TUBE/STENT;  Surgeon: Mandeep Park MD;  [facility]     ENDOSCOPIC RETROGRADE CHOLANGIOPANCREATOGRAM N/A  3/17/2016    Procedure: COMBINED ENDOSCOPIC RETROGRADE CHOLANGIOPANCREATOGRAPHY, REMOVE FOREIGN BODY OR STENT/TUBE;  Surgeon: Mandeep Park MD;  [facility]     ENDOSCOPIC RETROGRADE CHOLANGIOPANCREATOGRAM N/A 8/2/2016    Procedure: COMBINED ENDOSCOPIC RETROGRADE CHOLANGIOPANCREATOGRAPHY, PLACE TUBE/STENT;  Surgeon: Mandeep Park MD;  [facility]     ENDOSCOPIC RETROGRADE CHOLANGIOPANCREATOGRAM N/A 8/26/2016    Procedure: COMBINED ENDOSCOPIC RETROGRADE CHOLANGIOPANCREATOGRAPHY, REMOVE FOREIGN BODY OR STENT/TUBE;  Surgeon: Mandeep Park MD;  [facility]     ENDOSCOPIC ULTRASOUND UPPER GASTROINTESTINAL TRACT (GI) N/A 10/3/2016    Procedure: ENDOSCOPIC ULTRASOUND, ESOPHAGOSCOPY / UPPER GASTROINTESTINAL TRACT (GI);  Surgeon: Guru Jose Rodas MD;  [facility]     ENTEROSCOPY SMALL BOWEL N/A 2/3/2022    Procedure: ENTEROSCOPY with possible fistula closure;  Surgeon: Francisco Dodson MD;  Location:  GI     ESOPHAGOSCOPY, GASTROSCOPY, DUODENOSCOPY (EGD), COMBINED N/A 6/24/2015    Procedure: COMBINED ESOPHAGOSCOPY, GASTROSCOPY, DUODENOSCOPY (EGD), REMOVE FOREIGN BODY;  Surgeon: Mandeep Park MD;  Location:  GI     ESOPHAGOSCOPY, GASTROSCOPY, DUODENOSCOPY (EGD), COMBINED N/A 10/25/2015    Procedure: COMBINED ESOPHAGOSCOPY, GASTROSCOPY, DUODENOSCOPY (EGD);  Surgeon: Sammy Amaro MD;  Location:  GI     ESOPHAGOSCOPY, GASTROSCOPY, DUODENOSCOPY (EGD), COMBINED N/A 10/25/2015    Procedure: COMBINED ESOPHAGOSCOPY, GASTROSCOPY, DUODENOSCOPY (EGD), BIOPSY SINGLE OR MULTIPLE;  Surgeon: Sammy Amaro MD;  Location:  GI     ESOPHAGOSCOPY, GASTROSCOPY, DUODENOSCOPY (EGD), DILATATION, COMBINED       EXCISE LESION TRUNK N/A 4/17/2017    Procedure: EXCISE LESION TRUNK;  Removal of Abdominal Foreign Body;  Surgeon: Nestor Phoenix MD;  [facility]     HC ESOPH/GAS REFLUX TEST W NASAL IMPED >1 HR N/A 11/19/2015    Procedure: ESOPHAGEAL IMPEDENCE  FUNCTION TEST WITH 24 HOUR PH GREATER THAN 1 HOUR;  Surgeon: Thiago Apple MD;  [facility]     HC UGI ENDOSCOPY DIAG W BIOPSY  9/17/08     HC UGI ENDOSCOPY DIAG W BIOPSY  9/27/12     HC UGI ENDOSCOPY W ESOPHAGEAL DILATION BALLOON <30MM  9/17/08     HC UGI ENDOSCOPY W EUS N/A 5/5/2015    Procedure: COMBINED ENDOSCOPIC ULTRASOUND, ESOPHAGOSCOPY, GASTROSCOPY, DUODENOSCOPY (EGD);  Surgeon: Wm Dueñas MD;  [facility]     HC WRIST ARTHROSCOP,RELEASE XVERS LIG Bilateral 12/17/08     INJECT TRANSVERSUS ABDOMINIS PLANE (TAP) BLOCK BILATERAL Left 9/22/2016    Procedure: INJECT TRANSVERSUS ABDOMINIS PLANE (TAP) BLOCK BILATERAL;  Surgeon: Dickson Corrigan MD;  [facility]     INJECT TRIGGER POINT Bilateral 9/8/2022    Procedure: Abdominal trigger point injection with ultrasound;  Surgeon: Monika aMhajan MD;  [facility]     INJECT TRIGGER POINT SINGLE / MULTIPLE 3 OR MORE MUSCLES Right 11/15/2022    Procedure: Trigger point injections right abdomen with ultrasound guidance;  Surgeon: Monika Mahajan MD;  Location: [facility] OR     IR CHEST PORT PLACEMENT < 5 YRS OF AGE  6/10/2022     laparoscopic pineda  1995     LAPAROSCOPIC HERNIORRHAPHY INCISIONAL N/A 8/23/2018    Procedure: LAPAROSCOPIC HERNIORRHAPHY INCISIONAL;  Laparoscopic Incisional Hernia Repair with Symbotex Mesh Implant;  Surgeon: Nestor Phoenix MD;  [facility]     LAPAROSCOPIC PANCREATECTOMY, TRANSPLANT AUTO ISLET CELL N/A 12/28/2016    Procedure: LAPAROSCOPIC PANCREATECTOMY, TRANSPLANT AUTO ISLET CELL;  Surgeon: Nestor Phoenix MD;  [facility]     REMOVE GASTROSTOMY TUBE ADULT N/A 1/28/2022    Procedure: REMOVAL, GASTROSTOMY TUBE, ADULT;  Surgeon: Mandeep Park MD;  [facility]     REPLACE GASTROJEJUNOSTOMY TUBE, PERCUTANEOUS N/A 9/7/2021    Procedure: GASTROJEJUNOSTOMY TUBE PLACEMENT, PERCUTANEOUS, WITH GASTROPEXY;  Surgeon: Mandeep Park MD;  [facility]     REPLACE GASTROJEJUNOSTOMY TUBE,  PERCUTANEOUS N/A 2021    Procedure: REPLACEMENT, GASTROJEJUNOSTOMY TUBE, PERCUTANEOUS;  Surgeon: Zackery Montoya MD;  [facility]     REPLACE GASTROJEJUNOSTOMY TUBE, PERCUTANEOUS N/A 2022    Procedure: REPLACEMENT, GASTROJEJUNOSTOMY TUBE, PERCUTANEOUS;  Surgeon: Mandeep Park MD;  [facility]     REPLACE JEJUNOSTOMY TUBE, PERCUTANEOUS N/A 9/10/2021    Procedure: UPPER ENDOSCOPY, REPLACEMENT OF PERCUTANEOUS GASTROJEJUNOSTOMY TUBE, T-TAG GASTROPEXY;  Surgeon: Zackery Montoya MD;  [facility]     REPLACE JEJUNOSTOMY TUBE, PERCUTANEOUS N/A 2021    Procedure: REPLACEMENT, JEJUNOSTOMY TUBE, PERCUTANEOUS;  Surgeon: Mandeep Park MD;  [facility]     transphenoidal pituitary resection       [facility]  DELIVERY ONLY       [facility]  DELIVERY ONLY      repeat c section with incidental cystotomy with repair     [facility] EXCIS PITUITARY,TRANSNASAL/SEPTAL      pituitary tumor removed for diabetes insipidus     [facility] TOTAL ABDOM HYSTERECTOMY      w/ bilateral salpingoophorectomy       Allergies   Allergen Reactions     Apple Anaphylaxis     Corticosteroids Other (See Comments)     All oral, IV and injectable steroids are contraindicated (unless in life threatening situations) in [facility] [employer] transplant recipients. They can cause irreversible loss of islet cell function. Please contact patient's transplant care coordinator ADI Gaffney RN at [phone]/pager [phone] and/or endocrinologist prior to administration.       Depakote [Divalproex Sodium] Other (See Comments)     Chest pain     Zithromax [Azithromycin Dihydrate] Anaphylaxis     Noted in 08 ER     Bromfenac      Other reaction(s): Headache     Codeine      Other reaction(s): Hallucinations     Darvocet [Propoxyphene N-Apap] Itching     Morphine Nausea and Vomiting and Rash     Nalbuphine Hcl Rash     RASH :nubaine     Zosyn [Piperacillin-Tazobactam In D5w] Rash     Possible  allergy, did have a diffuse rash that seemed drug related but could have also been related to soap in the hospital.      Bactrim [Sulfamethoxazole W-Trimethoprim] Other (See Comments) and Nausea and Vomiting     Severely low liver function.     Hmg-Coa-R Inhibitors Other (See Comments)     Previous liver issues, risks vs benefits felt to not warrant statin.  Discussed Oct 2022 visit     Tape [Adhesive Tape] Blisters     Tramadol      Zofran [Ondansetron] Other (See Comments)     migraine     Flagyl [Metronidazole] Hives and Rash      Social History     Tobacco Use     Smoking status: Former     Packs/day: 0.50     Years: 6.00     Pack years: 3.00     Types: Cigarettes     Start date: 1985     Quit date: 1992     Years since quittin.9     Smokeless tobacco: Never     Tobacco comments:     no 2nd hand   Substance Use Topics     Alcohol use: Not Currently     Alcohol/week: 3.0 - 6.0 standard drinks     Types: 1 - 2 Glasses of wine, 1 - 2 Cans of beer, 1 - 2 Shots of liquor per week     Comment: none since IGG      Wt Readings from Last 1 Encounters:   22 65.6 kg (144 lb 9.6 oz)        Anesthesia Evaluation   Pt has had prior anesthetic. Type: General.    History of anesthetic complications  - PONV.  (responds well to scopolamine patch).    ROS/MED HX  ENT/Pulmonary: Comment: 3 pk yr hx, quit     (+) allergic rhinitis, tobacco use, Past use,  (-) asthma and sleep apnea   Neurologic:     (+) migraines,  (-) no seizures and no CVA   Cardiovascular:     (+) -----Previous cardiac testing   Echo: Date: Results:    Stress Test: Date: Results:    ECG Reviewed: Date: 21 Results:  Sinus rhythm  Cannot rule out Anterior infarct , age undetermined  Abnormal ECG   Ventricular rate 66 bpm     Cath: Date: Results:      METS/Exercise Tolerance: 4 - Raking leaves, gardening Comment: Endorses SOB when carrying laundry upstairs. Denies CP. +Fatigue, less stamina 2/2 malnutrition     Hematologic:  - neg  hematologic  ROS  (-) history of blood clots and history of blood transfusion   Musculoskeletal: Comment: lumbago, chrondromalacia, stiff shoulder  - neg musculoskeletal ROS     GI/Hepatic: Comment: Hx hepatic dome lesion/IgG4 pseudotumor and elevated LFT's of unclear etiology    Chronic pancreatitis    Severe gastroparesis    Malnutrition, wt loss  Hiatal hernia      (+) GERD, Asymptomatic on medication, hiatal hernia, cholecystitis/cholelithiasis,     Renal/Genitourinary:  - neg Renal ROS     Endo: Comment: post-pancreatectomy diabetes; pancreatic insufficiency.     (+) type I DM, thyroid problem, hypothyroidism,     Psychiatric/Substance Use:  - neg psychiatric ROS     Infectious Disease:  - neg infectious disease ROS     Malignancy:  - neg malignancy ROS (+) Malignancy (pituitary adenoma), History of Neuro.    Other:  - neg other ROS          Physical Exam    Airway        Mallampati: I   TM distance: > 3 FB   Neck ROM: full   Mouth opening: > 3 cm    Respiratory Devices and Support         Dental  no notable dental history         Cardiovascular   cardiovascular exam normal          Pulmonary   pulmonary exam normal                OUTSIDE LABS:  CBC:   Lab Results   Component Value Date    WBC 6.3 06/10/2022    WBC 5.0 02/17/2022    HGB 12.5 06/10/2022    HGB 13.1 02/17/2022    HCT 38.4 06/10/2022    HCT 40.0 02/17/2022     06/10/2022     02/17/2022     BMP:   Lab Results   Component Value Date     08/01/2022     06/10/2022    POTASSIUM 4.1 08/01/2022    POTASSIUM 4.1 06/10/2022    CHLORIDE 107 08/01/2022    CHLORIDE 108 06/10/2022    CO2 28 08/01/2022    CO2 26 06/10/2022    BUN 14 08/01/2022    BUN 12 06/10/2022    CR 0.67 08/01/2022    CR 0.58 06/10/2022    GLC 80 11/22/2022    GLC 84 11/22/2022     COAGS:   Lab Results   Component Value Date    PTT 29 01/07/2017    INR 1.03 06/10/2022    FIBR 225 12/28/2016     POC:   Lab Results   Component Value Date    BGM 85 08/24/2018    HCG  Negative 12/19/2016     HEPATIC:   Lab Results   Component Value Date    ALBUMIN 4.2 02/17/2022    PROTTOTAL 7.8 02/17/2022    ALT 31 02/17/2022    AST 23 02/17/2022    ALKPHOS 98 02/17/2022    BILITOTAL 0.9 02/17/2022     OTHER:   Lab Results   Component Value Date    PH 7.49 (H) 12/28/2016    LACT 0.9 12/03/2021    A1C 5.4 02/17/2022    KASSI 9.2 08/01/2022    PHOS 3.5 12/07/2021    MAG 2.1 12/07/2021    LIPASE <10 (L) 12/03/2021    AMYLASE 45 01/23/2017    TSH 0.67 09/16/2021    T4 1.13 03/23/2018    CRP 90.0 (H) 12/29/2016    SED 10 09/16/2021       Anesthesia Plan    ASA Status:  3   NPO Status:  NPO Appropriate    Anesthesia Type: General.     - Airway: ETT   Induction: Intravenous, Propofol.   Maintenance: Balanced.        Consents    Anesthesia Plan(s) and associated risks, benefits, and realistic alternatives discussed. Questions answered and patient/representative(s) expressed understanding.    - Discussed:     - Discussed with:  Patient      - Extended Intubation/Ventilatory Support Discussed: No.      - Patient is DNR/DNI Status: No    Use of blood products discussed: No .     Postoperative Care    Pain management: IV analgesics.   PONV prophylaxis: Ondansetron (or other 5HT-3), Dexamethasone or Solumedrol, Scopolamine patch     Comments:                Yusef Cavanaugh MD

## 2022-12-09 NOTE — ANESTHESIA CARE TRANSFER NOTE
Patient: Dinora Mcghee    Procedure: Procedure(s):  REPLACEMENT, GASTROJEJUNOSTOMY TUBE, PERCUTANEOUS with ENDOSCOPIC SUTURING.       Diagnosis: Gastroparesis [K31.84]  Diagnosis Additional Information: No value filed.    Anesthesia Type:   General     Note:    Oropharynx: oropharynx clear of all foreign objects and spontaneously breathing  Level of Consciousness: awake  Oxygen Supplementation: nasal cannula  Level of Supplemental Oxygen (L/min / FiO2): 2  Independent Airway: airway patency satisfactory and stable  Dentition: dentition unchanged  Vital Signs Stable: post-procedure vital signs reviewed and stable  Report to RN Given: handoff report given  Patient transferred to: PACU    Handoff Report: Identifed the Patient, Identified the Reponsible Provider, Reviewed the pertinent medical history, Discussed the surgical course, Reviewed Intra-OP anesthesia mangement and issues during anesthesia, Set expectations for post-procedure period and Allowed opportunity for questions and acknowledgement of understanding      Vitals:  Vitals Value Taken Time   BP     Temp     Pulse     Resp     SpO2         Electronically Signed By: NATALY Wylei CRNA  December 9, 2022  12:07 PM

## 2022-12-09 NOTE — DISCHARGE INSTRUCTIONS
[facility]  Same-Day Surgery   Adult Discharge Orders & Instructions     For 24 hours after surgery    Get plenty of rest.  A responsible adult must stay with you for at least 24 hours after you leave the hospital.   Do not drive or use heavy equipment.  If you have weakness or tingling, don't drive or use heavy equipment until this feeling goes away.  Do not drink alcohol.  Avoid strenuous or risky activities.  Ask for help when climbing stairs.   You may feel lightheaded.  IF so, sit for a few minutes before standing.  Have someone help you get up.   If you have nausea (feel sick to your stomach): Drink only clear liquids such as apple juice, ginger ale, broth or 7-Up.  Rest may also help.  Be sure to drink enough fluids.  Move to a regular diet as you feel able.  You may have a slight fever. Call the doctor if your fever is over 100 F (37.7 C) (taken under the tongue) or lasts longer than 24 hours.  You may have a dry mouth, a sore throat, muscle aches or trouble sleeping.  These should go away after 24 hours.  Do not make important or legal decisions.   Call your doctor for any of the followin.  Signs of infection (fever, growing tenderness at the surgery site, a large amount of drainage or bleeding, severe pain, foul-smelling drainage, redness, swelling).    2. It has been over 8 to 10 hours since surgery and you are still not able to urinate (pass water).    3.  Headache for over 24 hours.    4.  Numbness, tingling or weakness the day after surgery (if you had spinal anesthesia).  To contact a doctor, call Dr Park's clinic at [phone]  or:    '   [phone] and ask for the resident on call for GI (answered 24 hours a day)  '   Emergency Department:    [facility] [place]: [phone]       (TTY for hearing impaired: [phone])    [facility]: [phone]       (TTY for hearing impaired: [phone])

## 2022-12-09 NOTE — ANESTHESIA PROCEDURE NOTES
Airway       Patient location during procedure: OR       Procedure Start/Stop Times: 12/9/2022 10:01 AM  Staff -        CRNA: Monika Lora APRN CRNA       Performed By: CRNA  Consent for Airway        Urgency: elective  Indications and Patient Condition       Indications for airway management: andrez-procedural       Induction type:intravenous       Mask difficulty assessment: 1 - vent by mask    Final Airway Details       Final airway type: endotracheal airway       Successful airway: ETT - single  Endotracheal Airway Details        ETT size (mm): 7.0       Cuffed: yes       Successful intubation technique: direct laryngoscopy       DL Blade Type: Pino 2       Grade View of Cords: 1       Adjucts: stylet       Position: Right       Measured from: gums/teeth       Secured at (cm): 22       Bite block used: None    Post intubation assessment        Placement verified by: capnometry, equal breath sounds and chest rise        Number of attempts at approach: 1       Number of other approaches attempted: 0       Secured with: silk tape       Ease of procedure: easy       Dentition: Intact       Dental guard used and removed. Dental Guard Type: Standard White.    Medication(s) Administered   Medication Administration Time: 12/9/2022 10:01 AM

## 2022-12-09 NOTE — BRIEF OP NOTE
[facility]    Brief Operative Note    Pre-operative diagnosis: Gastroparesis [K31.84]  Post-operative diagnosis Gastrojejunostomy tube exchange    Procedure: Procedure(s):  REPLACEMENT, GASTROJEJUNOSTOMY TUBE, PERCUTANEOUS with ENDOSCOPIC SUTURING.  Surgeon: Surgeon(s) and Role:     * Mandeep Park MD - Primary     * Francisco Dodson MD - Assisting  Anesthesia: General   Estimated Blood Loss: None    Drains: None  Specimens: * No specimens in log *  Findings:   Successful endoscopic exchage of gastrojejunostomy.  Complications: None.  Implants: * No implants in log *    History:  Dinora Mcghee is a 57 year old female with a PMHx of TPIAT with course complicated by gastroparesis who has continued issues with her jejunal extension of her GJ tube which she requires for feeds and hydration. She last underwent placement of 18 Fr KISHORE-KEY 2.5cm  low-profile G-tube placed on 11/22/22. Abdominal X ray 12/5/22 showed Gastrojejunostomy catheter looped over the stomach. She presents for consideration of exchanging of G-J tube.     Impression:   - Uncomplicated placement of new 18 Fr by 45 cm percutaneous GJ tube (2.5 cm, ASIF brand GJ) via intact prior gastrostomy site and endoscopic wire placement. Tube was secured using four X-tacks sutures.     Recommendation:        -  - Observe patient in same day observation unit for ongoing care with plans for discharge to home later today   - Ok to use the PEG for venting and J for feeding per nutrition   - The findings and recommendations were discussed with the patient's family     Som Hernandez MD  GI Fellow

## 2022-12-09 NOTE — OR NURSING
Dr. Park at bedside. Continue to monitor in Phase II for pain control, medications given as per orders from Dr. Park. Patient glasses, implant card, and PEG tube supplies sent with patient. R port heparin flushed and locked. Discharge prescription picked up by spouse at discharge.

## 2022-12-09 NOTE — OR NURSING
Dr. Park at bedside. Continue to monitor in Phase II for pain control. Patient glasses, implant card, and PEG tube supplies sent with patient. Discharge prescription sent to pharmacy.

## 2022-12-10 ENCOUNTER — HOSPITAL ENCOUNTER (OUTPATIENT)
Facility: CLINIC | Age: 57
Discharge: HOME | End: 2022-12-10
Attending: INTERNAL MEDICINE | Admitting: INTERNAL MEDICINE
Payer: COMMERCIAL

## 2022-12-10 ENCOUNTER — ANCILLARY PROCEDURE (OUTPATIENT)
Dept: CT IMAGING | Facility: CLINIC | Age: 57
End: 2022-12-10
Attending: STUDENT IN AN ORGANIZED HEALTH CARE EDUCATION/TRAINING PROGRAM
Payer: COMMERCIAL

## 2022-12-10 VITALS
RESPIRATION RATE: 16 BRPM | OXYGEN SATURATION: 98 % | HEART RATE: 76 BPM | SYSTOLIC BLOOD PRESSURE: 126 MMHG | DIASTOLIC BLOOD PRESSURE: 84 MMHG

## 2022-12-10 DIAGNOSIS — K31.84 GASTROPARESIS: Primary | ICD-10-CM

## 2022-12-10 DIAGNOSIS — K31.84 GASTROPARESIS: ICD-10-CM

## 2022-12-10 LAB
CREAT BLD-MCNC: 0.7 MG/DL (ref 0.5–1)
GFR SERPL CREATININE-BSD FRML MDRD: >60 ML/MIN/1.73M2
PROVATION GI EXAM: NORMAL
UPPER GI ENDOSCOPY: NORMAL

## 2022-12-10 PROCEDURE — 74177 CT ABD & PELVIS W/CONTRAST: CPT | Mod: GC | Performed by: STUDENT IN AN ORGANIZED HEALTH CARE EDUCATION/TRAINING PROGRAM

## 2022-12-10 PROCEDURE — 43762 RPLC GTUBE NO REVJ TRC: CPT | Performed by: INTERNAL MEDICINE

## 2022-12-10 PROCEDURE — 82565 ASSAY OF CREATININE: CPT | Performed by: PATHOLOGY

## 2022-12-10 PROCEDURE — 250N000011 HC RX IP 250 OP 636: Performed by: INTERNAL MEDICINE

## 2022-12-10 PROCEDURE — G0500 MOD SEDAT ENDO SERVICE >5YRS: HCPCS | Performed by: INTERNAL MEDICINE

## 2022-12-10 RX ORDER — IOPAMIDOL 755 MG/ML
80 INJECTION, SOLUTION INTRAVASCULAR ONCE
Status: COMPLETED | OUTPATIENT
Start: 2022-12-10 | End: 2022-12-10

## 2022-12-10 RX ORDER — DIPHENHYDRAMINE HYDROCHLORIDE 50 MG/ML
INJECTION INTRAMUSCULAR; INTRAVENOUS PRN
Status: DISCONTINUED | OUTPATIENT
Start: 2022-12-10 | End: 2022-12-10 | Stop reason: HOSPADM

## 2022-12-10 RX ORDER — FLUMAZENIL 0.1 MG/ML
0.2 INJECTION, SOLUTION INTRAVENOUS
Status: DISCONTINUED | OUTPATIENT
Start: 2022-12-10 | End: 2022-12-10 | Stop reason: HOSPADM

## 2022-12-10 RX ORDER — FENTANYL CITRATE 50 UG/ML
INJECTION, SOLUTION INTRAMUSCULAR; INTRAVENOUS PRN
Status: DISCONTINUED | OUTPATIENT
Start: 2022-12-10 | End: 2022-12-10 | Stop reason: HOSPADM

## 2022-12-10 RX ORDER — NALOXONE HYDROCHLORIDE 0.4 MG/ML
0.2 INJECTION, SOLUTION INTRAMUSCULAR; INTRAVENOUS; SUBCUTANEOUS
Status: DISCONTINUED | OUTPATIENT
Start: 2022-12-10 | End: 2022-12-10 | Stop reason: HOSPADM

## 2022-12-10 RX ORDER — HEPARIN SODIUM,PORCINE 10 UNIT/ML
VIAL (ML) INTRAVENOUS PRN
Status: DISCONTINUED | OUTPATIENT
Start: 2022-12-10 | End: 2022-12-10 | Stop reason: HOSPADM

## 2022-12-10 RX ORDER — NALOXONE HYDROCHLORIDE 0.4 MG/ML
0.4 INJECTION, SOLUTION INTRAMUSCULAR; INTRAVENOUS; SUBCUTANEOUS
Status: DISCONTINUED | OUTPATIENT
Start: 2022-12-10 | End: 2022-12-10 | Stop reason: HOSPADM

## 2022-12-10 RX ORDER — LIDOCAINE 40 MG/G
CREAM TOPICAL
Status: DISCONTINUED | OUTPATIENT
Start: 2022-12-10 | End: 2022-12-10 | Stop reason: HOSPADM

## 2022-12-10 RX ADMIN — IOPAMIDOL 80 ML: 755 INJECTION, SOLUTION INTRAVASCULAR at 11:53

## 2022-12-10 ASSESSMENT — HEADACHE IMPACT TEST (HIT 6)
WHEN YOU HAVE HEADACHES HOW OFTEN IS THE PAIN SEVERE: VERY OFTEN
HOW OFTEN HAVE YOU FELT FED UP OR IRRITATED BECAUSE OF YOUR HEADACHES: VERY OFTEN
HOW OFTEN DO HEADACHES LIMIT YOUR DAILY ACTIVITIES: VERY OFTEN
HOW OFTEN HAVE YOU FELT TOO TIRED TO WORK BECAUSE OF YOUR HEADACHES: VERY OFTEN
HIT6 TOTAL SCORE: 68
HOW OFTEN DID HEADACHS LIMIT CONCENTRATION ON WORK OR DAILY ACTIVITY: VERY OFTEN
WHEN YOU HAVE A HEADACHE HOW OFTEN DO YOU WISH YOU COULD LIE DOWN: ALWAYS

## 2022-12-10 ASSESSMENT — ACTIVITIES OF DAILY LIVING (ADL): ADLS_ACUITY_SCORE: 37

## 2022-12-10 NOTE — BRIEF OP NOTE
[facility]    Brief Operative Note    Pre-operative diagnosis: Coiled GJ tube,  Post-operative diagnosis Same as pre-operative diagnosis    Procedure: Procedure(s):  REPLACEMENT, GASTROSTOMY TUBE, WITHOUT ENDOSCOPY  Surgeon: Surgeon(s) and Role:     * Mandeep Park MD - Primary  Anesthesia: Moderate Sedation   Estimated Blood Loss: None    Drains: None  Specimens: * No specimens in log *  Findings:   None.  Complications: None.  Implants: 18F 3cm lo profile G tube  1mg versed, 100mg, benadryl 25 IV.   GJ removed, wire placed, 18F 3cm ballon inflated to 6cc, Auscultation aspiration proved intragastric location    Discharged home w     PLAN: Diet as much as tolerated.   Discuss surgical J tube w Dr Bauer

## 2022-12-10 NOTE — CONFIDENTIAL NOTE
Patient had exchange of GJ tube with Dr Park yesterday. Has had issues with J portion flipping into stomach. Woke up this AM with abdominal discomfort and taste of tube feeds in her mouth. Tube feeds ar running through J portion but when she vented the G portion, tube feeds came out.     Suspect J portion may have flipped back into stomach.     - CT scan with IV contrast to evaluate feeding tube position and possible gastric outlet obstruction caused by x tacks used to hold feeding tube in place.    - pt will go to clinic for imaging today   - Advised patient to hold tube feed for now.       Discussed with Dr. Park.       Crescencio Park MD  GI fellow

## 2022-12-10 NOTE — DISCHARGE INSTRUCTIONS
Emergency Department Discharge Information for Dinora Farias was seen in the Emergency Department today for replacement of her G-tube.    You may begin using the G-tube as usual.     Please return to the ED or contact her regular clinic if:     she becomes much more ill  she has severe pain  she isn't tolerating her usual medicines or feedings through the tube  she has more than a small amount of bleeding at the site   the tube comes out again  or you have any other concerns.      Please make an appointment to follow up with her primary care provider or regular clinic if you have any concerns about how she is doing.

## 2022-12-12 NOTE — PROVIDER NOTIFICATION
Patient reported that she had to come back to the hospital the next day, saturday, and had to have the GJ tube removed due to it coiling back into her stomach.  she is taking pain medication, and is in communication with her surgeon about her plan of care going forward.

## 2022-12-14 ENCOUNTER — PREP FOR PROCEDURE (OUTPATIENT)
Dept: GASTROENTEROLOGY | Facility: CLINIC | Age: 57
End: 2022-12-14

## 2022-12-14 ENCOUNTER — PREP FOR PROCEDURE (OUTPATIENT)
Dept: SURGERY | Facility: CLINIC | Age: 57
End: 2022-12-14

## 2022-12-14 ENCOUNTER — PATIENT OUTREACH (OUTPATIENT)
Dept: GASTROENTEROLOGY | Facility: CLINIC | Age: 57
End: 2022-12-14

## 2022-12-14 DIAGNOSIS — K31.84 GASTROPARESIS: Primary | ICD-10-CM

## 2022-12-14 NOTE — TELEPHONE ENCOUNTER
Pt called, her gtube fell out, used the back up one she had at home,     GJ tube removed, replaced w 3cm low profile 18F G  tube.     PT having pain, heat/tyelnol not working    Per Dr. Park, should order 3cm tube to sent home to patient, pt scheduled for clinic and OR with Dr Lizette CORTEZ

## 2022-12-16 ENCOUNTER — VIRTUAL VISIT (OUTPATIENT)
Dept: PSYCHOLOGY | Facility: CLINIC | Age: 57
End: 2022-12-16
Payer: COMMERCIAL

## 2022-12-16 DIAGNOSIS — F43.23 ADJUSTMENT DISORDER WITH MIXED ANXIETY AND DEPRESSED MOOD: Primary | ICD-10-CM

## 2022-12-16 PROCEDURE — 90834 PSYTX W PT 45 MINUTES: CPT | Mod: 95 | Performed by: STUDENT IN AN ORGANIZED HEALTH CARE EDUCATION/TRAINING PROGRAM

## 2022-12-16 ASSESSMENT — PATIENT HEALTH QUESTIONNAIRE - PHQ9
SUM OF ALL RESPONSES TO PHQ QUESTIONS 1-9: 14
10. IF YOU CHECKED OFF ANY PROBLEMS, HOW DIFFICULT HAVE THESE PROBLEMS MADE IT FOR YOU TO DO YOUR WORK, TAKE CARE OF THINGS AT HOME, OR GET ALONG WITH OTHER PEOPLE: SOMEWHAT DIFFICULT
SUM OF ALL RESPONSES TO PHQ QUESTIONS 1-9: 14

## 2022-12-16 NOTE — PROGRESS NOTES
"  Health Psychology                                                                                                                          Sweta Michelle, Ph.D., L.P [phone]  Melva Beebe, Ph.D., L.P. [phone]  Rachel Sloan, Ph.D, L..P. [phone]  Kenya Sanchez, Ph.D., L.P. [phone]  Estrada Patel, Ph.D., A.B.P.P., L.P. [phone]         Enedina Joaquin, Ph.D., L.P. [phone]       Leesa Porras, Ph.D., A.B.P.P., LP [phone]           [facility], 3rd Floor  [address] [place] [address]     Health Psychology Follow-up Visit - Telehealth Visit     Dinora Mcghee is a 57 year old female who is being evaluated via a billable video visit.       The patient has been notified of following:      \"This video visit will be conducted via a video visit between you and your physician/provider. We have found that certain health care needs can be provided without the need for an in-person physical exam.  This service lets us provide the care you need with a video conversation.  If a prescription is necessary we can send it directly to your pharmacy.  If lab work is needed we can place an order for that and you can then stop by our lab to have the test done at a later time.     Video visits are billed at different rates depending on your insurance coverage.  Please reach out to your insurance provider with any questions.     If during the course of the call the physician/provider feels a video visit is not appropriate, you will not be charged for this service.\"     Patient has given verbal consent for Video visit? Yes  Will anyone else be joining your video visit? No    Date of Service:  12/16/22    SUBJECTIVE:  Dinora Mcghee was seen for individual psychotherapy. Reviewed emotional and physical health since our last session. Dinora had reached out for support in coping with anxiety related to upcoming surgery.    Symptoms reported: Pain high " currently, perhaps more sleep than when we last met, anxiety present but not as intense as previous visit although still limiting leaving the house because of this and symptoms.     Progress towards goals: Reached out to PCP, increased melatonin but did not want to prescribe more than melatonin. Preparing for upcoming consultation with Dr. Bauer, hoping there can be a productive surgical plan. Continuing to implement adaptive coping skills.     Interventions utilized: Reviewed experiences in interim. Dinora has surgery scheduled for 12/30, will be meeting with Dr. Bauer. Discussed how she is measuring life satisfaction - satisfied with friends/family relationships, not with health. Feeling very resigned to situation. Dissatisfied with lack of energy. Not having a lot of wins, this is very challenging for someone she self-describes as achievement-oriented. Discussed aspects of current persona - dull, inactive, per her descriptions. Feeling that if this next step doesn't work she doesn't know what else to do. Incredible guilt with not contributing to family. Numb to having J-tube put in, (resigned). 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    Had flu in interim, had to wait to remove J-tube due to this. Has new feeding tube 12/10 - anchored it in, clips. Dinora reported increased pain after this procedure, following morning tasting feeding formula. Tube had flipped.     Validated her emotional experiences, held space for her to freely express reactions and to discuss what she is doing that helps her cope.      Ms. Mcghee was engaged throughout the visit and expressed understanding of information provided.    OBJECTIVE:  Ms. Mcghee was available for scheduled visit.  She reported anxious mood. Affect was mood- and thought-congruent. Insight and judgment good.  Thought processes logical and linear. Speech WNL.  Denied  suicidal ideation.     ASSESSMENT:  Psychological Assessment:  The PHQ-9 is an instrument for screening, diagnosing, monitoring and measuring the severity of depression. Scores of 5, 10, 15, and 20 represent cutpoints for mild, moderate, moderately severe and severe depression, respectively.   PHQ-9 SCORE 6/21/2021 7/8/2021 12/16/2022   PHQ-9 Total Score MyChart 13 (Moderate depression) 13 (Moderate depression) 14 (Moderate depression)   PHQ-9 Total Score 13 13 14       The DARCY-7 is an instrument for screening, diagnosing, monitoring and measuring the severity of anxiety. Scores of 5, 10, and 15 represent cutpoints for mild, moderate, and severe anxiety, respectively.  DARCY-7 SCORE 6/21/2021 10/11/2022 12/3/2022   Total Score 0 (minimal anxiety) 5 (mild anxiety) 6 (mild anxiety)   Total Score 0 5 6     Therapy appropriate for Dinora at this time - feeling more anxiety associated with health issues, pain, nausea.     DIAGNOSIS:  Adjustment disorder with mixed anxiety and depressed mood.     PLAN:  Follow-up after next surgery.     Type of service: Telemedicine Psychotherapy for coping with health conditions and symptoms of depression and anxiety  Time of service:   ? Date: 12/16/22  ? Time Service Began: 10:00  ? Time Service Ended: 10:45  Reason that telemedicine is appropriate: Public health regulations due to COVID-19 pandemic/state of emergency  Mode of transmission: Secure real time interactive audio and visual telecommunication system via dough or  [employer].  Location of originating and distant sites:  ? Originating site (patient location): Patient's home  ? Distant site (provider site): Provider's office    Rachel Sloan PhD,   Clinical Health Psychologist    Tx plan completed: 7/8/21  Tx plan due:  7/8/22 - will update if we continue to meet.

## 2022-12-19 ENCOUNTER — TELEPHONE (OUTPATIENT)
Dept: ANESTHESIOLOGY | Facility: CLINIC | Age: 57
End: 2022-12-19

## 2022-12-19 ENCOUNTER — OFFICE VISIT (OUTPATIENT)
Dept: NEUROLOGY | Facility: CLINIC | Age: 57
End: 2022-12-19
Payer: COMMERCIAL

## 2022-12-19 ENCOUNTER — PATIENT OUTREACH (OUTPATIENT)
Dept: GASTROENTEROLOGY | Facility: CLINIC | Age: 57
End: 2022-12-19

## 2022-12-19 DIAGNOSIS — R10.9 CHRONIC ABDOMINAL PAIN: Primary | ICD-10-CM

## 2022-12-19 DIAGNOSIS — G89.29 CHRONIC ABDOMINAL PAIN: Primary | ICD-10-CM

## 2022-12-19 DIAGNOSIS — M79.18 MYOFASCIAL PAIN: ICD-10-CM

## 2022-12-19 DIAGNOSIS — G43.719 INTRACTABLE CHRONIC MIGRAINE WITHOUT AURA AND WITHOUT STATUS MIGRAINOSUS: Primary | ICD-10-CM

## 2022-12-19 LAB
ALBUMIN UR-MCNC: NEGATIVE MG/DL
APPEARANCE UR: CLEAR
BILIRUB UR QL STRIP: NEGATIVE
COLOR UR AUTO: NORMAL
GLUCOSE UR STRIP-MCNC: NEGATIVE MG/DL
HGB UR QL STRIP: NEGATIVE
KETONES UR STRIP-MCNC: NEGATIVE MG/DL
LEUKOCYTE ESTERASE UR QL STRIP: NEGATIVE
NITRATE UR QL: NEGATIVE
OSMOLALITY UR: 406 MMOL/KG (ref 100–1200)
PH UR STRIP: 6 [PH] (ref 5–7)
SP GR UR STRIP: 1.02 (ref 1–1.03)
UROBILINOGEN UR STRIP-MCNC: NORMAL MG/DL

## 2022-12-19 PROCEDURE — 83935 ASSAY OF URINE OSMOLALITY: CPT | Performed by: PEDIATRICS

## 2022-12-19 PROCEDURE — 81003 URINALYSIS AUTO W/O SCOPE: CPT | Performed by: PEDIATRICS

## 2022-12-19 PROCEDURE — 64615 CHEMODENERV MUSC MIGRAINE: CPT | Performed by: NURSE PRACTITIONER

## 2022-12-19 RX ORDER — METHOCARBAMOL 750 MG/1
750 TABLET, FILM COATED ORAL 4 TIMES DAILY
Qty: 120 TABLET | Refills: 0 | Status: ON HOLD | OUTPATIENT
Start: 2022-12-19 | End: 2023-02-02

## 2022-12-19 NOTE — LETTER
12/19/2022       RE: Dinora Mcghee  [address] [facility] [address]     Dear Colleague,    Thank you for referring your patient, Dinora Mcghee, to the [facility] [place] at [facility]. Please see a copy of my visit note below.    BOTULINUM NEUROTOXIN INJECTION PROCEDURES:  DATA:12/19/2022  INDICATIONS FOR PROCEDURES:   chronic migraine headaches. The patient  is improving overall in her migraine management and continues to be a good candidate for ongoing Botox.  baseline symptoms have been recalcitrant to oral medications and conservative therapy. Patient is here today for a repeat injection with Botox.     GOAL OF PROCEDURE:  The goal of this procedure is to decrease pain and enhance functional independence.     TOTAL DOSE ADMINISTERED:  Dose Administered:  155 units  Botox (Botulinum Toxin Type A)       2:1 Dilution    Wasted 45 units     CONSENT:  The risks, benefits, and treatment options were discussed with the patient who agreed to proceed.     Written consent was obtained      EQUIPMENT USED:  Needles-30 gauge, 0.5 inches for injections  Four 1-ml tuberculin syringes for injections  One sodium chloride 10 ml vial preservative free  Alcohol swabs     SKIN PREPARATION:  Skin preparation was performed using an alcohol wipe.        AREA/MUSCLE INJECTED:  155 units of Botox     Right upper Trapezius (upper cervical) - 5 units of Botox at 3 site/s.   Left upper Trapezius (upper cervical) - 5 units of Botox at 3 site/s.      Right cervical paraspinals - 5 units of Botox at 2 site/s.   Left cervical paraspinals - 5 units of Botox at 2 site/s.      Left occipitalis - 5 units of Botox at 3 site/s.  Right occipitalis -5 units of Botox at 3 site/s        Right Frontalis - 5 units of Botox at 2 site/s.  Left Frontalis - 5 units of Botox at 2 site/s.     Right Temporalis - 5 units of Botox at 4 site/s.  Left Temporalis - 5 units of Botox at  4 site/s.     Right  - 5 units of Botox at 1 site/s.              Left  - 5 units of Botox at 1 site/s.     Procerus - 5 units of Botox at 1 site/s.     RESPONSE TO PROCEDURE:  tolerated the procedure well and there were no immediate complications.  Patient was allowed to recover for an appropriate period of time and was discharged home in stable condition.     FOLLOW UP:     Repeat Botox injections in 12 weeks        This procedure was performed under a hospital privileging agreement with Dr. Briseno              Again, thank you for allowing me to participate in the care of your patient.      Sincerely,    NATALY Hoffman CNP

## 2022-12-19 NOTE — TELEPHONE ENCOUNTER
[facility] Call Center    Phone Message    May a detailed message be left on voicemail: yes     Reason for Call: Other: Patient would like a call back to discuss the pain she has been having lately.      Action Taken: Other: Pain    Travel Screening: Not Applicable

## 2022-12-19 NOTE — TELEPHONE ENCOUNTER
RN spoke with patient regarding her pain. Patient reports her PEG tube coiled and caused severe pain in upper abdomen, under rib cage. Patient reports she will be having surgery on 12/30 for her PEG tube placement. Patient asked if there was any pain management options in the mean time. Writer informed writer Dr. Mahajan was out of clinic for the next couple weeks and advised patient to speak with PCP. Patient reports her PCP is also out of clinic. Writer was able to schedule patient for a follow up appointment 1/23/23. Patient requested a refill of methocarbamol. Writer sent prescription to pharmacy. Patient appreciated the call back.    Elsie Leone RNCC

## 2022-12-22 ENCOUNTER — DOCUMENTATION ONLY (OUTPATIENT)
Dept: TRANSPLANT | Facility: CLINIC | Age: 57
End: 2022-12-22

## 2022-12-22 ENCOUNTER — VIRTUAL VISIT (OUTPATIENT)
Dept: SURGERY | Facility: CLINIC | Age: 57
End: 2022-12-22
Payer: COMMERCIAL

## 2022-12-22 ENCOUNTER — PREP FOR PROCEDURE (OUTPATIENT)
Dept: TRANSPLANT | Facility: CLINIC | Age: 57
End: 2022-12-22

## 2022-12-22 VITALS — BODY MASS INDEX: 21.79 KG/M2 | WEIGHT: 143.8 LBS | HEIGHT: 68 IN

## 2022-12-22 DIAGNOSIS — K31.84 GASTROPARESIS: Primary | ICD-10-CM

## 2022-12-22 DIAGNOSIS — E44.0 MODERATE PROTEIN-CALORIE MALNUTRITION (H): ICD-10-CM

## 2022-12-22 PROCEDURE — 99203 OFFICE O/P NEW LOW 30 MIN: CPT | Mod: 95 | Performed by: SURGERY

## 2022-12-22 ASSESSMENT — PAIN SCALES - GENERAL: PAINLEVEL: MILD PAIN (3)

## 2022-12-22 NOTE — PROGRESS NOTES
"Patient's case discussed at [facility] ([facility] on 12/21/22.     Patient had been on domperidone trial with Dr. Mandeep Park and \"failed\" trial as she had no positive outcomes from medication. She has had endoscopically-placed feeding tubes that have flipped; does well while J-tube is in place.     Patient to see Dr. Bauer in consult on 12/22/22 and will have surgical GJ placed on 12/30/22. Per GI team, may be worth considering dosing of Prucalopride post-operatively.     Tania Riley RN BSN  Transplant coordinator   [facility] program     Phone: [phone]  Toll Free: [phone]  Fax: [phone]  Pager: [phone]            "

## 2022-12-22 NOTE — PROGRESS NOTES
Dinora Mcghee is a 57 year old who is being evaluated via a billable video visit.      How would you like to obtain your AVS? MyChart  If the video visit is dropped, the invitation should be resent by: Text to cell phone: [phone]  Will anyone else be joining your video visit? No  If patient encounters technical issues they should call [phone]    During this virtual visit the patient is located in [place], patient verifies this as the location during the entirety of this visit.     Video-Visit Details  Video Start Time: 0745    Type of service:  Video Visit    Video End Time:0815    Originating Location (pt. Location): Home    Distant Location (provider location):  On-site    Platform used for Video Visit: CONRAD Montesinos 12/22/2022      7:05 AM    See dictated [id number]  GB

## 2022-12-22 NOTE — NURSING NOTE
"Chief Complaint   Patient presents with     RECHECK     Surgery consult for G/J options       Vitals:    12/22/22 0705   Weight: 65.2 kg (143 lb 12.8 oz)   Height: 1.727 m (5' 8\")       Body mass index is 21.86 kg/m .                          Buddy Issa, EMT    "

## 2022-12-22 NOTE — PROGRESS NOTES
Service Date: 2022    Ms. Loo is here for evaluation for placement of a feeding jejunostomy due to severe malnutrition and gastroparesis.  Over the last year, they have had significant struggles with both gastrojejunostomy tubes with a jejunostomy tube becoming displaced into the stomach and with an endoscopically placed J tube, which cause significant pain due to its location close to the left anterior superior iliac spine.      We spent the majority of our 30-minute visit discussing with the patient and her  the approach of an open jejunostomy feeding tube placement and the need for a lysis of adhesions given her previous surgery.  I discussed the risks of surgery including bowel obstruction, intra-abdominal abscess and wound infection and the postoperative management of the feeding tube.  We answered, the patient and her 's questions.  We plan on a lysis of adhesions and placement of a jejunostomy feeding tube next Friday.    Visit time 30 minutes for a diagnosis of severe gastroparesis and malnutrition.    Elver Bauer MD        D: 2022   T: 2022   MT: NORMA    Name:     ODETTE DAVISSILVA CORDERO  MRN:      [id number]        Account:      [id number]   :      1965           Service Date: 2022       Document: [id number]

## 2022-12-22 NOTE — LETTER
12/22/2022       RE: Dinora Mcghee  [address] [facility] [address]     Dear Colleague,    Thank you for referring your patient, Dinora Mcghee, to the [facility] [place] at [facility]. Please see a copy of my visit note below.    Dinora Mcghee is a 57 year old who is being evaluated via a billable video visit.      How would you like to obtain your AVS? MyChart  If the video visit is dropped, the invitation should be resent by: Text to cell phone: [phone]  Will anyone else be joining your video visit? No  If patient encounters technical issues they should call [phone]    During this virtual visit the patient is located in [place], patient verifies this as the location during the entirety of this visit.       See dictated [id number]  GB      Service Date: 12/22/2022    Ms. Loo is here for evaluation for placement of a feeding jejunostomy due to severe malnutrition and gastroparesis.  Over the last year, they have had significant struggles with both gastrojejunostomy tubes with a jejunostomy tube becoming displaced into the stomach and with an endoscopically placed J tube, which cause significant pain due to its location close to the left anterior superior iliac spine.      We spent the majority of our 30-minute visit discussing with the patient and her  the approach of an open jejunostomy feeding tube placement and the need for a lysis of adhesions given her previous surgery.  I discussed the risks of surgery including bowel obstruction, intra-abdominal abscess and wound infection and the postoperative management of the feeding tube.  We answered, the patient and her 's questions.  We plan on a lysis of adhesions and placement of a jejunostomy feeding tube next Friday.    Visit time 30 minutes for a diagnosis of severe gastroparesis and malnutrition.    Elver Bauer MD        D: 12/22/2022   T:  2022   MT: NORMA    Name:     MALINI DAVIS  MRN:      [id number]        Account:      [id number]   :      1965           Service Date: 2022       Document: [id number]

## 2022-12-26 ENCOUNTER — PATIENT OUTREACH (OUTPATIENT)
Dept: GASTROENTEROLOGY | Facility: CLINIC | Age: 57
End: 2022-12-26

## 2022-12-26 NOTE — TELEPHONE ENCOUNTER
Pt called in, another gtube balloon popped, used her back up one, needs another back up one.     PT will see Dr Good on 12/30/22 for jtube placement and low profile gtube placement.     Called [facility] to order her an additional back up gtube to be delivered with her next supply delivery.    ML

## 2022-12-26 NOTE — PROGRESS NOTES
BOTULINUM NEUROTOXIN INJECTION PROCEDURES:  DATA:12/19/2022  INDICATIONS FOR PROCEDURES:   chronic migraine headaches. The patient  is improving overall in her migraine management and continues to be a good candidate for ongoing Botox.  baseline symptoms have been recalcitrant to oral medications and conservative therapy. Patient is here today for a repeat injection with Botox.     GOAL OF PROCEDURE:  The goal of this procedure is to decrease pain and enhance functional independence.     TOTAL DOSE ADMINISTERED:  Dose Administered:  155 units  Botox (Botulinum Toxin Type A)       2:1 Dilution    Wasted 45 units     CONSENT:  The risks, benefits, and treatment options were discussed with the patient who agreed to proceed.     Written consent was obtained      EQUIPMENT USED:  Needles-30 gauge, 0.5 inches for injections  Four 1-ml tuberculin syringes for injections  One sodium chloride 10 ml vial preservative free  Alcohol swabs     SKIN PREPARATION:  Skin preparation was performed using an alcohol wipe.        AREA/MUSCLE INJECTED:  155 units of Botox     Right upper Trapezius (upper cervical) - 5 units of Botox at 3 site/s.   Left upper Trapezius (upper cervical) - 5 units of Botox at 3 site/s.      Right cervical paraspinals - 5 units of Botox at 2 site/s.   Left cervical paraspinals - 5 units of Botox at 2 site/s.      Left occipitalis - 5 units of Botox at 3 site/s.  Right occipitalis -5 units of Botox at 3 site/s        Right Frontalis - 5 units of Botox at 2 site/s.  Left Frontalis - 5 units of Botox at 2 site/s.     Right Temporalis - 5 units of Botox at 4 site/s.  Left Temporalis - 5 units of Botox at 4 site/s.     Right  - 5 units of Botox at 1 site/s.              Left  - 5 units of Botox at 1 site/s.     Procerus - 5 units of Botox at 1 site/s.     RESPONSE TO PROCEDURE:  tolerated the procedure well and there were no immediate complications.  Patient was allowed to recover for an  appropriate period of time and was discharged home in stable condition.     FOLLOW UP:     Repeat Botox injections in 12 weeks        This procedure was performed under a hospital privileging agreement with Dr. Finn العلي, NATALY, [facility]

## 2022-12-28 ENCOUNTER — ANESTHESIA EVENT (OUTPATIENT)
Dept: SURGERY | Facility: CLINIC | Age: 57
End: 2022-12-28
Payer: COMMERCIAL

## 2022-12-28 ASSESSMENT — LIFESTYLE VARIABLES: TOBACCO_USE: 1

## 2022-12-28 ASSESSMENT — ENCOUNTER SYMPTOMS: SEIZURES: 0

## 2022-12-28 NOTE — ANESTHESIA PREPROCEDURE EVALUATION
Anesthesia Pre-Procedure Evaluation    Patient: Dinora Mcghee   MRN: [id number] : 1965        Procedure : Procedure(s):  Surgical placement of jejunostomy for enteral feeding          Past Medical History:   Diagnosis Date     Allergic rhinitis, cause unspecified      Allergy to other foods     strawberries, apples, celeries, alice, watermelon     Arthritis     left knee     Choledocholithiasis     long after cholecystectomy     Chronic abdominal pain      Chronic constipation      Chronic nausea      Chronic pancreatitis (H)      Degeneration of lumbar or lumbosacral intervertebral disc      Esophageal reflux     w/ hiatal hernia     Gastroparesis      Hiatal hernia      History of pituitary adenoma     s/p resection     Hypothyroidism      Migraines      Mild hyperlipidemia      On tube feeding diet     presence of GJ tube     Pancreatic disease     PD stricture, 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 endometriosis growth     APPENDECTOMY        SECTION       COLONOSCOPY       ENDOSCOPIC INSERTION TUBE GASTROSTOMY N/A 2021    Procedure: Gastrojejonostomy placement with jejunopexy, PEG tube exchange;  Surgeon: Zackery Montoya MD;  [facility]     ENDOSCOPIC RETROGRADE CHOLANGIOPANCREATOGRAM N/A 2015    Procedure: ENDOSCOPIC RETROGRADE CHOLANGIOPANCREATOGRAM;  Surgeon: Mandeep Park MD;  [facility]     ENDOSCOPIC RETROGRADE CHOLANGIOPANCREATOGRAM N/A 2016    Procedure: COMBINED ENDOSCOPIC RETROGRADE CHOLANGIOPANCREATOGRAPHY, PLACE TUBE/STENT;  Surgeon: Mandeep Park MD;  [facility]     ENDOSCOPIC RETROGRADE CHOLANGIOPANCREATOGRAM  N/A 3/17/2016    Procedure: COMBINED ENDOSCOPIC RETROGRADE CHOLANGIOPANCREATOGRAPHY, REMOVE FOREIGN BODY OR STENT/TUBE;  Surgeon: Mandeep Park MD;  [facility]     ENDOSCOPIC RETROGRADE CHOLANGIOPANCREATOGRAM N/A 8/2/2016    Procedure: COMBINED ENDOSCOPIC RETROGRADE CHOLANGIOPANCREATOGRAPHY, PLACE TUBE/STENT;  Surgeon: Mandeep Park MD;  [facility]     ENDOSCOPIC RETROGRADE CHOLANGIOPANCREATOGRAM N/A 8/26/2016    Procedure: COMBINED ENDOSCOPIC RETROGRADE CHOLANGIOPANCREATOGRAPHY, REMOVE FOREIGN BODY OR STENT/TUBE;  Surgeon: Mandeep Park MD;  [facility]     ENDOSCOPIC ULTRASOUND UPPER GASTROINTESTINAL TRACT (GI) N/A 10/3/2016    Procedure: ENDOSCOPIC ULTRASOUND, ESOPHAGOSCOPY / UPPER GASTROINTESTINAL TRACT (GI);  Surgeon: Guru Jose Rodas MD;  [facility]     ENTEROSCOPY SMALL BOWEL N/A 2/3/2022    Procedure: ENTEROSCOPY with possible fistula closure;  Surgeon: Francisco Dodson MD;  Location:  GI     ESOPHAGOSCOPY, GASTROSCOPY, DUODENOSCOPY (EGD), COMBINED N/A 6/24/2015    Procedure: COMBINED ESOPHAGOSCOPY, GASTROSCOPY, DUODENOSCOPY (EGD), REMOVE FOREIGN BODY;  Surgeon: Mandeep Park MD;  Location:  GI     ESOPHAGOSCOPY, GASTROSCOPY, DUODENOSCOPY (EGD), COMBINED N/A 10/25/2015    Procedure: COMBINED ESOPHAGOSCOPY, GASTROSCOPY, DUODENOSCOPY (EGD);  Surgeon: Sammy Amaro MD;  Location:  GI     ESOPHAGOSCOPY, GASTROSCOPY, DUODENOSCOPY (EGD), COMBINED N/A 10/25/2015    Procedure: COMBINED ESOPHAGOSCOPY, GASTROSCOPY, DUODENOSCOPY (EGD), BIOPSY SINGLE OR MULTIPLE;  Surgeon: Sammy Amaro MD;  Location:  GI     ESOPHAGOSCOPY, GASTROSCOPY, DUODENOSCOPY (EGD), DILATATION, COMBINED       EXCISE LESION TRUNK N/A 4/17/2017    Procedure: EXCISE LESION TRUNK;  Removal of Abdominal Foreign Body;  Surgeon: Nestor Phoenix MD;  [facility]     HC ESOPH/GAS REFLUX TEST W NASAL IMPED >1 HR N/A 11/19/2015    Procedure: ESOPHAGEAL  IMPEDENCE FUNCTION TEST WITH 24 HOUR PH GREATER THAN 1 HOUR;  Surgeon: Thiago Apple MD;  [facility]     HC UGI ENDOSCOPY DIAG W BIOPSY  9/17/08     HC UGI ENDOSCOPY DIAG W BIOPSY  9/27/12     HC UGI ENDOSCOPY W ESOPHAGEAL DILATION BALLOON <30MM  9/17/08     HC UGI ENDOSCOPY W EUS N/A 5/5/2015    Procedure: COMBINED ENDOSCOPIC ULTRASOUND, ESOPHAGOSCOPY, GASTROSCOPY, DUODENOSCOPY (EGD);  Surgeon: Wm Dueñas MD;  [facility]     HC WRIST ARTHROSCOP,RELEASE XVERS LIG Bilateral 12/17/08     INJECT TRANSVERSUS ABDOMINIS PLANE (TAP) BLOCK BILATERAL Left 9/22/2016    Procedure: INJECT TRANSVERSUS ABDOMINIS PLANE (TAP) BLOCK BILATERAL;  Surgeon: Dickson Corrigan MD;  [facility]     INJECT TRIGGER POINT Bilateral 9/8/2022    Procedure: Abdominal trigger point injection with ultrasound;  Surgeon: Monika Mahajan MD;  [facility]     INJECT TRIGGER POINT SINGLE / MULTIPLE 3 OR MORE MUSCLES Right 11/15/2022    Procedure: Trigger point injections right abdomen with ultrasound guidance;  Surgeon: Monika Mahajan MD;  Location: [facility]     IR CHEST PORT PLACEMENT < 5 YRS OF AGE  6/10/2022     laparoscopic pineda  1995     LAPAROSCOPIC HERNIORRHAPHY INCISIONAL N/A 8/23/2018    Procedure: LAPAROSCOPIC HERNIORRHAPHY INCISIONAL;  Laparoscopic Incisional Hernia Repair with Symbotex Mesh Implant;  Surgeon: Nestor Phoenix MD;  [facility]     LAPAROSCOPIC PANCREATECTOMY, TRANSPLANT AUTO ISLET CELL N/A 12/28/2016    Procedure: LAPAROSCOPIC PANCREATECTOMY, TRANSPLANT AUTO ISLET CELL;  Surgeon: Nestor Phoenix MD;  [facility]     REMOVE GASTROSTOMY TUBE ADULT N/A 1/28/2022    Procedure: REMOVAL, GASTROSTOMY TUBE, ADULT;  Surgeon: Mandeep Park MD;  [facility]     REPLACE GASTROJEJUNOSTOMY TUBE, PERCUTANEOUS N/A 9/7/2021    Procedure: GASTROJEJUNOSTOMY TUBE PLACEMENT, PERCUTANEOUS, WITH GASTROPEXY;  Surgeon: Mandeep Park MD;  [facility]     REPLACE GASTROJEJUNOSTOMY  TUBE, PERCUTANEOUS N/A 2021    Procedure: REPLACEMENT, GASTROJEJUNOSTOMY TUBE, PERCUTANEOUS;  Surgeon: Zackery Montoya MD;  [facility]     REPLACE GASTROJEJUNOSTOMY TUBE, PERCUTANEOUS N/A 2022    Procedure: REPLACEMENT, GASTROJEJUNOSTOMY TUBE, PERCUTANEOUS;  Surgeon: Mandeep Park MD;  [facility]     REPLACE GASTROJEJUNOSTOMY TUBE, PERCUTANEOUS N/A 2022    Procedure: REPLACEMENT, GASTROJEJUNOSTOMY TUBE, PERCUTANEOUS with ENDOSCOPIC SUTURING.;  Surgeon: Mandeep Park MD;  [facility]     REPLACE JEJUNOSTOMY TUBE, PERCUTANEOUS N/A 9/10/2021    Procedure: UPPER ENDOSCOPY, REPLACEMENT OF PERCUTANEOUS GASTROJEJUNOSTOMY TUBE, T-TAG GASTROPEXY;  Surgeon: Zackery Montoya MD;  [facility]     REPLACE JEJUNOSTOMY TUBE, PERCUTANEOUS N/A 2021    Procedure: REPLACEMENT, JEJUNOSTOMY TUBE, PERCUTANEOUS;  Surgeon: Mandeep Park MD;  [facility]     transphenoidal pituitary resection       [facility]  DELIVERY ONLY       [facility]  DELIVERY ONLY      repeat c section with incidental cystotomy with repair     [facility] EXCIS PITUITARY,TRANSNASAL/SEPTAL      pituitary tumor removed for diabetes insipidus     [facility] TOTAL ABDOM HYSTERECTOMY      w/ bilateral salpingoophorectomy       Allergies   Allergen Reactions     Apple Anaphylaxis     Corticosteroids Other (See Comments)     All oral, IV and injectable steroids are contraindicated (unless in life threatening situations) in [facility] [employer] transplant recipients. They can cause irreversible loss of islet cell function. Please contact patient's transplant care coordinator ADI Gaffney RN at [phone]/pager [phone] and/or endocrinologist prior to administration.       Depakote [Divalproex Sodium] Other (See Comments)     Chest pain     Zithromax [Azithromycin Dihydrate] Anaphylaxis     Noted in 08 ER     Bromfenac      Other reaction(s): Headache     Codeine      Other  reaction(s): Hallucinations     Darvocet [Propoxyphene N-Apap] Itching     Morphine Nausea and Vomiting and Rash     Nalbuphine Hcl Rash     RASH :nubaine     Zosyn [Piperacillin-Tazobactam In D5w] Rash     Possible allergy, did have a diffuse rash that seemed drug related but could have also been related to soap in the hospital.      Bactrim [Sulfamethoxazole W-Trimethoprim] Other (See Comments) and Nausea and Vomiting     Severely low liver function.     Hmg-Coa-R Inhibitors Other (See Comments)     Previous liver issues, risks vs benefits felt to not warrant statin.  Discussed Oct 2022 visit     Tape [Adhesive Tape] Blisters     Tramadol      Zofran [Ondansetron] Other (See Comments)     migraine     Flagyl [Metronidazole] Hives and Rash      Social History     Tobacco Use     Smoking status: Former     Packs/day: 0.50     Years: 6.00     Pack years: 3.00     Types: Cigarettes     Start date: 1985     Quit date: 1992     Years since quittin.0     Smokeless tobacco: Never     Tobacco comments:     no 2nd hand   Substance Use Topics     Alcohol use: Not Currently     Alcohol/week: 3.0 - 6.0 standard drinks     Types: 1 - 2 Glasses of wine, 1 - 2 Cans of beer, 1 - 2 Shots of liquor per week     Comment: none since IGG      Wt Readings from Last 1 Encounters:   22 65.2 kg (143 lb 12.8 oz)        Anesthesia Evaluation   Pt has had prior anesthetic. Type: General.    History of anesthetic complications  - PONV.  (responds well to scopolamine patch).    ROS/MED HX  ENT/Pulmonary: Comment: 3 pk yr hx, quit     (+) allergic rhinitis, tobacco use, Past use,  (-) asthma and sleep apnea   Neurologic:     (+) migraines,  (-) no seizures and no CVA   Cardiovascular:     (+) -----Previous cardiac testing   Echo: Date: Results:    Stress Test: Date: Results:    ECG Reviewed: Date: 21 Results:  Sinus rhythm  Cannot rule out Anterior infarct , age undetermined  Abnormal ECG   Ventricular rate 66 bpm      Cath: Date: Results:      METS/Exercise Tolerance: 4 - Raking leaves, gardening Comment: Endorses SOB when carrying laundry upstairs. Denies CP. +Fatigue, less stamina 2/2 malnutrition     Hematologic:  - neg hematologic  ROS  (-) history of blood clots and history of blood transfusion   Musculoskeletal: Comment: lumbago, chrondromalacia, stiff shoulder  - neg musculoskeletal ROS     GI/Hepatic: Comment: Hx hepatic dome lesion/IgG4 pseudotumor and elevated LFT's of unclear etiology    Chronic pancreatitis    Severe gastroparesis    Malnutrition, wt loss  Hiatal hernia      (+) GERD, Asymptomatic on medication, hiatal hernia, cholecystitis/cholelithiasis,     Renal/Genitourinary:  - neg Renal ROS     Endo: Comment: post-pancreatectomy diabetes; pancreatic insufficiency.     (+) type I DM, thyroid problem, hypothyroidism,     Psychiatric/Substance Use:  - neg psychiatric ROS     Infectious Disease:  - neg infectious disease ROS     Malignancy:  - neg malignancy ROS (+) Malignancy (pituitary adenoma), History of Neuro.    Other:  - neg other ROS          Physical Exam    Airway        Mallampati: II    Neck ROM: full   Mouth opening: > 3 cm    Respiratory Devices and Support         Dental         B=Bridge, C=Chipped, L=Loose, M=Missing    Cardiovascular   cardiovascular exam normal          Pulmonary   pulmonary exam normal                OUTSIDE LABS:  CBC:   Lab Results   Component Value Date    WBC 6.3 06/10/2022    WBC 5.0 02/17/2022    HGB 12.5 06/10/2022    HGB 13.1 02/17/2022    HCT 38.4 06/10/2022    HCT 40.0 02/17/2022     06/10/2022     02/17/2022     BMP:   Lab Results   Component Value Date     08/01/2022     06/10/2022    POTASSIUM 4.1 08/01/2022    POTASSIUM 4.1 06/10/2022    CHLORIDE 107 08/01/2022    CHLORIDE 108 06/10/2022    CO2 28 08/01/2022    CO2 26 06/10/2022    BUN 14 08/01/2022    BUN 12 06/10/2022    CR 0.7 12/10/2022    CR 0.67 08/01/2022    GLC 86 12/09/2022      (H) 12/09/2022     COAGS:   Lab Results   Component Value Date    PTT 29 01/07/2017    INR 1.03 06/10/2022    FIBR 225 12/28/2016     POC:   Lab Results   Component Value Date    BGM 85 08/24/2018    HCG Negative 12/19/2016     HEPATIC:   Lab Results   Component Value Date    ALBUMIN 4.2 02/17/2022    PROTTOTAL 7.8 02/17/2022    ALT 31 02/17/2022    AST 23 02/17/2022    ALKPHOS 98 02/17/2022    BILITOTAL 0.9 02/17/2022     OTHER:   Lab Results   Component Value Date    PH 7.49 (H) 12/28/2016    LACT 0.9 12/03/2021    A1C 5.4 02/17/2022    KASSI 9.2 08/01/2022    PHOS 3.5 12/07/2021    MAG 2.1 12/07/2021    LIPASE <10 (L) 12/03/2021    AMYLASE 45 01/23/2017    TSH 0.67 09/16/2021    T4 1.13 03/23/2018    CRP 90.0 (H) 12/29/2016    SED 10 09/16/2021       Anesthesia Plan    ASA Status:  3   NPO Status:  NPO Appropriate    Anesthesia Type: General.     - Airway: ETT   Induction: Intravenous.   Maintenance: TIVA.   Techniques and Equipment:     - Lines/Monitors: BIS     Consents    Anesthesia Plan(s) and associated risks, benefits, and realistic alternatives discussed. Questions answered and patient/representative(s) expressed understanding.    - Discussed:     - Discussed with:  Patient      - Extended Intubation/Ventilatory Support Discussed: No.      - Patient is DNR/DNI Status: No    Use of blood products discussed: No .     Postoperative Care    Pain management: Multi-modal analgesia, IV analgesics.   PONV prophylaxis: Ondansetron (or other 5HT-3), Background Propofol Infusion, Dexamethasone or Solumedrol     Comments:           H&P reviewed: Unable to attach H&P to encounter due to EHR limitations. H&P Update: appropriate H&P reviewed, patient examined. No interval changes since H&P (within 30 days).         Gina Severino MD

## 2022-12-30 ENCOUNTER — ANESTHESIA (OUTPATIENT)
Dept: SURGERY | Facility: CLINIC | Age: 57
End: 2022-12-30
Payer: COMMERCIAL

## 2022-12-30 ENCOUNTER — HOSPITAL ENCOUNTER (INPATIENT)
Facility: CLINIC | Age: 57
LOS: 10 days | Discharge: HOME HEALTH | End: 2023-01-09
Attending: SURGERY | Admitting: SURGERY
Payer: COMMERCIAL

## 2022-12-30 DIAGNOSIS — K94.23 GASTROSTOMY TUBE OBSTRUCTION (H): ICD-10-CM

## 2022-12-30 DIAGNOSIS — K31.84 GASTROPARESIS: Primary | ICD-10-CM

## 2022-12-30 DIAGNOSIS — R63.39 FEEDING INTOLERANCE: ICD-10-CM

## 2022-12-30 LAB
GLUCOSE BLDC GLUCOMTR-MCNC: 111 MG/DL (ref 70–99)
GLUCOSE BLDC GLUCOMTR-MCNC: 138 MG/DL (ref 70–99)
GLUCOSE BLDC GLUCOMTR-MCNC: 96 MG/DL (ref 70–99)

## 2022-12-30 PROCEDURE — 370N000017 HC ANESTHESIA TECHNICAL FEE, PER MIN: Performed by: SURGERY

## 2022-12-30 PROCEDURE — 258N000003 HC RX IP 258 OP 636: Performed by: NURSE ANESTHETIST, CERTIFIED REGISTERED

## 2022-12-30 PROCEDURE — 0DHA0UZ INSERTION OF FEEDING DEVICE INTO JEJUNUM, OPEN APPROACH: ICD-10-PCS | Performed by: SURGERY

## 2022-12-30 PROCEDURE — 999N000010 HC STATISTIC ANES STAT CODE-CRNA PER MINUTE: Performed by: SURGERY

## 2022-12-30 PROCEDURE — 250N000011 HC RX IP 250 OP 636: Performed by: ANESTHESIOLOGY

## 2022-12-30 PROCEDURE — 250N000011 HC RX IP 250 OP 636: Performed by: SURGERY

## 2022-12-30 PROCEDURE — 360N000075 HC SURGERY LEVEL 2, PER MIN: Performed by: SURGERY

## 2022-12-30 PROCEDURE — 250N000013 HC RX MED GY IP 250 OP 250 PS 637: Performed by: SURGERY

## 2022-12-30 PROCEDURE — 44120 REMOVAL OF SMALL INTESTINE: CPT | Mod: 22 | Performed by: SURGERY

## 2022-12-30 PROCEDURE — 250N000009 HC RX 250: Performed by: NURSE ANESTHETIST, CERTIFIED REGISTERED

## 2022-12-30 PROCEDURE — 88307 TISSUE EXAM BY PATHOLOGIST: CPT | Mod: 26 | Performed by: PATHOLOGY

## 2022-12-30 PROCEDURE — C9290 INJ, BUPIVACAINE LIPOSOME: HCPCS | Performed by: ANESTHESIOLOGY

## 2022-12-30 PROCEDURE — 250N000011 HC RX IP 250 OP 636

## 2022-12-30 PROCEDURE — 120N000002 HC R&B MED SURG/OB UMMC

## 2022-12-30 PROCEDURE — 272N000001 HC OR GENERAL SUPPLY STERILE: Performed by: SURGERY

## 2022-12-30 PROCEDURE — 88307 TISSUE EXAM BY PATHOLOGIST: CPT | Mod: TC | Performed by: SURGERY

## 2022-12-30 PROCEDURE — 250N000011 HC RX IP 250 OP 636: Performed by: NURSE ANESTHETIST, CERTIFIED REGISTERED

## 2022-12-30 PROCEDURE — 999N000141 HC STATISTIC PRE-PROCEDURE NURSING ASSESSMENT: Performed by: SURGERY

## 2022-12-30 PROCEDURE — 0DB80ZZ EXCISION OF SMALL INTESTINE, OPEN APPROACH: ICD-10-PCS | Performed by: SURGERY

## 2022-12-30 PROCEDURE — 0DN80ZZ RELEASE SMALL INTESTINE, OPEN APPROACH: ICD-10-PCS | Performed by: SURGERY

## 2022-12-30 PROCEDURE — 710N000010 HC RECOVERY PHASE 1, LEVEL 2, PER MIN: Performed by: SURGERY

## 2022-12-30 RX ORDER — FENTANYL CITRATE 50 UG/ML
25 INJECTION, SOLUTION INTRAMUSCULAR; INTRAVENOUS
Status: DISCONTINUED | OUTPATIENT
Start: 2022-12-30 | End: 2022-12-30 | Stop reason: HOSPADM

## 2022-12-30 RX ORDER — HYDRALAZINE HYDROCHLORIDE 20 MG/ML
2.5-5 INJECTION INTRAMUSCULAR; INTRAVENOUS EVERY 10 MIN PRN
Status: DISCONTINUED | OUTPATIENT
Start: 2022-12-30 | End: 2022-12-30 | Stop reason: HOSPADM

## 2022-12-30 RX ORDER — ONDANSETRON 2 MG/ML
4 INJECTION INTRAMUSCULAR; INTRAVENOUS EVERY 30 MIN PRN
Status: DISCONTINUED | OUTPATIENT
Start: 2022-12-30 | End: 2022-12-30 | Stop reason: HOSPADM

## 2022-12-30 RX ORDER — DIPHENHYDRAMINE HCL 12.5MG/5ML
25 LIQUID (ML) ORAL EVERY 6 HOURS PRN
Status: DISCONTINUED | OUTPATIENT
Start: 2022-12-30 | End: 2022-12-31

## 2022-12-30 RX ORDER — ERTAPENEM 1 G/1
1 INJECTION, POWDER, LYOPHILIZED, FOR SOLUTION INTRAMUSCULAR; INTRAVENOUS
Status: COMPLETED | OUTPATIENT
Start: 2022-12-30 | End: 2022-12-30

## 2022-12-30 RX ORDER — HYDROMORPHONE HCL IN WATER/PF 6 MG/30 ML
0.2 PATIENT CONTROLLED ANALGESIA SYRINGE INTRAVENOUS EVERY 5 MIN PRN
Status: DISCONTINUED | OUTPATIENT
Start: 2022-12-30 | End: 2022-12-30 | Stop reason: HOSPADM

## 2022-12-30 RX ORDER — EPHEDRINE SULFATE 50 MG/ML
INJECTION, SOLUTION INTRAMUSCULAR; INTRAVENOUS; SUBCUTANEOUS PRN
Status: DISCONTINUED | OUTPATIENT
Start: 2022-12-30 | End: 2022-12-30

## 2022-12-30 RX ORDER — LIDOCAINE 40 MG/G
CREAM TOPICAL
Status: DISCONTINUED | OUTPATIENT
Start: 2022-12-30 | End: 2023-01-09 | Stop reason: HOSPADM

## 2022-12-30 RX ORDER — ERTAPENEM 1 G/1
1 INJECTION, POWDER, LYOPHILIZED, FOR SOLUTION INTRAMUSCULAR; INTRAVENOUS EVERY 8 HOURS PRN
Status: DISCONTINUED | OUTPATIENT
Start: 2022-12-30 | End: 2022-12-30 | Stop reason: HOSPADM

## 2022-12-30 RX ORDER — FENTANYL CITRATE 50 UG/ML
50 INJECTION, SOLUTION INTRAMUSCULAR; INTRAVENOUS EVERY 5 MIN PRN
Status: DISCONTINUED | OUTPATIENT
Start: 2022-12-30 | End: 2022-12-30 | Stop reason: HOSPADM

## 2022-12-30 RX ORDER — LIDOCAINE 40 MG/G
CREAM TOPICAL
Status: DISCONTINUED | OUTPATIENT
Start: 2022-12-30 | End: 2022-12-30 | Stop reason: HOSPADM

## 2022-12-30 RX ORDER — FAMOTIDINE 40 MG/5ML
20 POWDER, FOR SUSPENSION ORAL DAILY
Status: DISCONTINUED | OUTPATIENT
Start: 2022-12-31 | End: 2023-01-09 | Stop reason: HOSPADM

## 2022-12-30 RX ORDER — FENTANYL CITRATE 50 UG/ML
25 INJECTION, SOLUTION INTRAMUSCULAR; INTRAVENOUS EVERY 5 MIN PRN
Status: DISCONTINUED | OUTPATIENT
Start: 2022-12-30 | End: 2022-12-30 | Stop reason: HOSPADM

## 2022-12-30 RX ORDER — NALOXONE HYDROCHLORIDE 0.4 MG/ML
0.2 INJECTION, SOLUTION INTRAMUSCULAR; INTRAVENOUS; SUBCUTANEOUS
Status: DISCONTINUED | OUTPATIENT
Start: 2022-12-30 | End: 2023-01-09 | Stop reason: HOSPADM

## 2022-12-30 RX ORDER — PROPOFOL 10 MG/ML
INJECTION, EMULSION INTRAVENOUS PRN
Status: DISCONTINUED | OUTPATIENT
Start: 2022-12-30 | End: 2022-12-30

## 2022-12-30 RX ORDER — FLUMAZENIL 0.1 MG/ML
0.2 INJECTION, SOLUTION INTRAVENOUS
Status: DISCONTINUED | OUTPATIENT
Start: 2022-12-30 | End: 2022-12-30 | Stop reason: HOSPADM

## 2022-12-30 RX ORDER — ACETAMINOPHEN 325 MG/1
975 TABLET ORAL ONCE
Status: DISCONTINUED | OUTPATIENT
Start: 2022-12-30 | End: 2022-12-30 | Stop reason: HOSPADM

## 2022-12-30 RX ORDER — MEPERIDINE HYDROCHLORIDE 25 MG/ML
12.5 INJECTION INTRAMUSCULAR; INTRAVENOUS; SUBCUTANEOUS
Status: DISCONTINUED | OUTPATIENT
Start: 2022-12-30 | End: 2022-12-30 | Stop reason: HOSPADM

## 2022-12-30 RX ORDER — SODIUM CHLORIDE, SODIUM LACTATE, POTASSIUM CHLORIDE, CALCIUM CHLORIDE 600; 310; 30; 20 MG/100ML; MG/100ML; MG/100ML; MG/100ML
INJECTION, SOLUTION INTRAVENOUS CONTINUOUS
Status: DISCONTINUED | OUTPATIENT
Start: 2022-12-30 | End: 2022-12-30 | Stop reason: HOSPADM

## 2022-12-30 RX ORDER — NALOXONE HYDROCHLORIDE 0.4 MG/ML
0.4 INJECTION, SOLUTION INTRAMUSCULAR; INTRAVENOUS; SUBCUTANEOUS
Status: DISCONTINUED | OUTPATIENT
Start: 2022-12-30 | End: 2022-12-30 | Stop reason: HOSPADM

## 2022-12-30 RX ORDER — NALOXONE HYDROCHLORIDE 0.4 MG/ML
0.4 INJECTION, SOLUTION INTRAMUSCULAR; INTRAVENOUS; SUBCUTANEOUS
Status: DISCONTINUED | OUTPATIENT
Start: 2022-12-30 | End: 2023-01-09 | Stop reason: HOSPADM

## 2022-12-30 RX ORDER — DEXTROSE MONOHYDRATE 25 G/50ML
25-50 INJECTION, SOLUTION INTRAVENOUS
Status: DISCONTINUED | OUTPATIENT
Start: 2022-12-30 | End: 2023-01-09 | Stop reason: HOSPADM

## 2022-12-30 RX ORDER — LABETALOL HYDROCHLORIDE 5 MG/ML
10 INJECTION, SOLUTION INTRAVENOUS
Status: DISCONTINUED | OUTPATIENT
Start: 2022-12-30 | End: 2022-12-30 | Stop reason: HOSPADM

## 2022-12-30 RX ORDER — HYDROMORPHONE HYDROCHLORIDE 1 MG/ML
0.4 INJECTION, SOLUTION INTRAMUSCULAR; INTRAVENOUS; SUBCUTANEOUS EVERY 5 MIN PRN
Status: DISCONTINUED | OUTPATIENT
Start: 2022-12-30 | End: 2022-12-30 | Stop reason: HOSPADM

## 2022-12-30 RX ORDER — NALOXONE HYDROCHLORIDE 0.4 MG/ML
0.2 INJECTION, SOLUTION INTRAMUSCULAR; INTRAVENOUS; SUBCUTANEOUS
Status: DISCONTINUED | OUTPATIENT
Start: 2022-12-30 | End: 2022-12-30 | Stop reason: HOSPADM

## 2022-12-30 RX ORDER — PROCHLORPERAZINE MALEATE 5 MG
10 TABLET ORAL EVERY 6 HOURS PRN
Status: DISCONTINUED | OUTPATIENT
Start: 2022-12-30 | End: 2023-01-09 | Stop reason: HOSPADM

## 2022-12-30 RX ORDER — HYDROMORPHONE HYDROCHLORIDE 1 MG/ML
0.2 INJECTION, SOLUTION INTRAMUSCULAR; INTRAVENOUS; SUBCUTANEOUS EVERY 5 MIN PRN
Status: DISCONTINUED | OUTPATIENT
Start: 2022-12-30 | End: 2022-12-30 | Stop reason: HOSPADM

## 2022-12-30 RX ORDER — HALOPERIDOL 5 MG/ML
1 INJECTION INTRAMUSCULAR
Status: DISCONTINUED | OUTPATIENT
Start: 2022-12-30 | End: 2022-12-30 | Stop reason: HOSPADM

## 2022-12-30 RX ORDER — HYDROMORPHONE HCL IN WATER/PF 6 MG/30 ML
0.4 PATIENT CONTROLLED ANALGESIA SYRINGE INTRAVENOUS EVERY 5 MIN PRN
Status: DISCONTINUED | OUTPATIENT
Start: 2022-12-30 | End: 2022-12-30 | Stop reason: HOSPADM

## 2022-12-30 RX ORDER — SCOLOPAMINE TRANSDERMAL SYSTEM 1 MG/1
1 PATCH, EXTENDED RELEASE TRANSDERMAL ONCE
Status: COMPLETED | OUTPATIENT
Start: 2022-12-30 | End: 2022-12-31

## 2022-12-30 RX ORDER — SODIUM CHLORIDE, SODIUM LACTATE, POTASSIUM CHLORIDE, CALCIUM CHLORIDE 600; 310; 30; 20 MG/100ML; MG/100ML; MG/100ML; MG/100ML
INJECTION, SOLUTION INTRAVENOUS CONTINUOUS PRN
Status: DISCONTINUED | OUTPATIENT
Start: 2022-12-30 | End: 2022-12-30

## 2022-12-30 RX ORDER — DIPHENHYDRAMINE HYDROCHLORIDE 50 MG/ML
INJECTION INTRAMUSCULAR; INTRAVENOUS PRN
Status: DISCONTINUED | OUTPATIENT
Start: 2022-12-30 | End: 2022-12-30

## 2022-12-30 RX ORDER — FENTANYL CITRATE 50 UG/ML
25-50 INJECTION, SOLUTION INTRAMUSCULAR; INTRAVENOUS
Status: DISCONTINUED | OUTPATIENT
Start: 2022-12-30 | End: 2022-12-30 | Stop reason: HOSPADM

## 2022-12-30 RX ORDER — OMEPRAZOLE
40 KIT
Status: DISCONTINUED | OUTPATIENT
Start: 2022-12-31 | End: 2023-01-09 | Stop reason: HOSPADM

## 2022-12-30 RX ORDER — SODIUM CHLORIDE, SODIUM LACTATE, POTASSIUM CHLORIDE, CALCIUM CHLORIDE 600; 310; 30; 20 MG/100ML; MG/100ML; MG/100ML; MG/100ML
INJECTION, SOLUTION INTRAVENOUS CONTINUOUS
Status: DISCONTINUED | OUTPATIENT
Start: 2022-12-30 | End: 2023-01-06

## 2022-12-30 RX ORDER — NICOTINE POLACRILEX 4 MG
15-30 LOZENGE BUCCAL
Status: DISCONTINUED | OUTPATIENT
Start: 2022-12-30 | End: 2023-01-09 | Stop reason: HOSPADM

## 2022-12-30 RX ORDER — ONDANSETRON 4 MG/1
4 TABLET, ORALLY DISINTEGRATING ORAL EVERY 30 MIN PRN
Status: DISCONTINUED | OUTPATIENT
Start: 2022-12-30 | End: 2022-12-30 | Stop reason: HOSPADM

## 2022-12-30 RX ORDER — PROPOFOL 10 MG/ML
INJECTION, EMULSION INTRAVENOUS CONTINUOUS PRN
Status: DISCONTINUED | OUTPATIENT
Start: 2022-12-30 | End: 2022-12-30

## 2022-12-30 RX ORDER — PROCHLORPERAZINE 25 MG
25 SUPPOSITORY, RECTAL RECTAL EVERY 12 HOURS PRN
Status: DISCONTINUED | OUTPATIENT
Start: 2022-12-30 | End: 2023-01-08

## 2022-12-30 RX ORDER — DIAZEPAM 10 MG/2ML
2.5 INJECTION, SOLUTION INTRAMUSCULAR; INTRAVENOUS
Status: DISCONTINUED | OUTPATIENT
Start: 2022-12-30 | End: 2022-12-30 | Stop reason: HOSPADM

## 2022-12-30 RX ORDER — FENTANYL CITRATE 50 UG/ML
INJECTION, SOLUTION INTRAMUSCULAR; INTRAVENOUS PRN
Status: DISCONTINUED | OUTPATIENT
Start: 2022-12-30 | End: 2022-12-30

## 2022-12-30 RX ORDER — HYDROMORPHONE HYDROCHLORIDE 1 MG/ML
0.3 INJECTION, SOLUTION INTRAMUSCULAR; INTRAVENOUS; SUBCUTANEOUS ONCE
Status: COMPLETED | OUTPATIENT
Start: 2022-12-30 | End: 2022-12-30

## 2022-12-30 RX ORDER — ACETAMINOPHEN 325 MG/10.15ML
650 LIQUID ORAL EVERY 6 HOURS
Status: DISCONTINUED | OUTPATIENT
Start: 2022-12-30 | End: 2023-01-04

## 2022-12-30 RX ORDER — BUPIVACAINE HYDROCHLORIDE 2.5 MG/ML
INJECTION, SOLUTION EPIDURAL; INFILTRATION; INTRACAUDAL
Status: DISCONTINUED | OUTPATIENT
Start: 2022-12-30 | End: 2022-12-30

## 2022-12-30 RX ORDER — ACETAMINOPHEN 325 MG/1
975 TABLET ORAL ONCE
Status: COMPLETED | OUTPATIENT
Start: 2022-12-30 | End: 2022-12-30

## 2022-12-30 RX ADMIN — SODIUM CHLORIDE, POTASSIUM CHLORIDE, SODIUM LACTATE AND CALCIUM CHLORIDE: 600; 310; 30; 20 INJECTION, SOLUTION INTRAVENOUS at 14:30

## 2022-12-30 RX ADMIN — ACETAMINOPHEN 650 MG: 325 SUSPENSION ORAL at 20:59

## 2022-12-30 RX ADMIN — HYDROMORPHONE HYDROCHLORIDE 0.5 MG: 1 INJECTION, SOLUTION INTRAMUSCULAR; INTRAVENOUS; SUBCUTANEOUS at 15:43

## 2022-12-30 RX ADMIN — PROMETHAZINE HYDROCHLORIDE 25 MG: 25 INJECTION INTRAMUSCULAR; INTRAVENOUS at 14:32

## 2022-12-30 RX ADMIN — PROPOFOL 80 MG: 10 INJECTION, EMULSION INTRAVENOUS at 13:57

## 2022-12-30 RX ADMIN — FENTANYL CITRATE 25 MCG: 50 INJECTION, SOLUTION INTRAMUSCULAR; INTRAVENOUS at 16:33

## 2022-12-30 RX ADMIN — ERTAPENEM SODIUM 1 G: 1 INJECTION, POWDER, LYOPHILIZED, FOR SOLUTION INTRAMUSCULAR; INTRAVENOUS at 14:06

## 2022-12-30 RX ADMIN — HYDROMORPHONE HYDROCHLORIDE 0.3 MG: 1 INJECTION, SOLUTION INTRAMUSCULAR; INTRAVENOUS; SUBCUTANEOUS at 19:45

## 2022-12-30 RX ADMIN — NOREPINEPHRINE BITARTRATE 6.4 MCG: 1 INJECTION, SOLUTION, CONCENTRATE INTRAVENOUS at 15:00

## 2022-12-30 RX ADMIN — FENTANYL CITRATE 50 MCG: 50 INJECTION, SOLUTION INTRAMUSCULAR; INTRAVENOUS at 14:21

## 2022-12-30 RX ADMIN — BUPIVACAINE 20 ML: 13.3 INJECTION, SUSPENSION, LIPOSOMAL INFILTRATION at 17:35

## 2022-12-30 RX ADMIN — SUGAMMADEX 200 MG: 100 INJECTION, SOLUTION INTRAVENOUS at 15:47

## 2022-12-30 RX ADMIN — Medication 5 MG: at 14:39

## 2022-12-30 RX ADMIN — DIPHENHYDRAMINE HYDROCHLORIDE 25 MG: 50 INJECTION, SOLUTION INTRAMUSCULAR; INTRAVENOUS at 13:57

## 2022-12-30 RX ADMIN — Medication: at 20:59

## 2022-12-30 RX ADMIN — MIDAZOLAM 2 MG: 1 INJECTION INTRAMUSCULAR; INTRAVENOUS at 13:43

## 2022-12-30 RX ADMIN — Medication 20 MG: at 14:22

## 2022-12-30 RX ADMIN — PROPOFOL 125 MCG/KG/MIN: 10 INJECTION, EMULSION INTRAVENOUS at 13:59

## 2022-12-30 RX ADMIN — SODIUM CHLORIDE, POTASSIUM CHLORIDE, SODIUM LACTATE AND CALCIUM CHLORIDE: 600; 310; 30; 20 INJECTION, SOLUTION INTRAVENOUS at 13:55

## 2022-12-30 RX ADMIN — PHENYLEPHRINE HYDROCHLORIDE 100 MCG: 10 INJECTION INTRAVENOUS at 14:42

## 2022-12-30 RX ADMIN — Medication 5 MG: at 14:50

## 2022-12-30 RX ADMIN — PHENYLEPHRINE HYDROCHLORIDE 100 MCG: 10 INJECTION INTRAVENOUS at 14:44

## 2022-12-30 RX ADMIN — FENTANYL CITRATE 25 MCG: 50 INJECTION, SOLUTION INTRAMUSCULAR; INTRAVENOUS at 16:19

## 2022-12-30 RX ADMIN — PHENYLEPHRINE HYDROCHLORIDE 100 MCG: 10 INJECTION INTRAVENOUS at 14:50

## 2022-12-30 RX ADMIN — PROCHLORPERAZINE EDISYLATE 10 MG: 5 INJECTION INTRAMUSCULAR; INTRAVENOUS at 19:21

## 2022-12-30 RX ADMIN — PROPOFOL 50 MG: 10 INJECTION, EMULSION INTRAVENOUS at 14:22

## 2022-12-30 RX ADMIN — BUPIVACAINE HYDROCHLORIDE 20 ML: 2.5 INJECTION, SOLUTION EPIDURAL; INFILTRATION; INTRACAUDAL; PERINEURAL at 17:34

## 2022-12-30 RX ADMIN — FENTANYL CITRATE 25 MCG: 50 INJECTION, SOLUTION INTRAMUSCULAR; INTRAVENOUS at 15:22

## 2022-12-30 RX ADMIN — Medication 10 MG: at 14:42

## 2022-12-30 RX ADMIN — ACETAMINOPHEN 975 MG: 325 TABLET, FILM COATED ORAL at 13:34

## 2022-12-30 RX ADMIN — HYDROMORPHONE HYDROCHLORIDE 0.4 MG: 1 INJECTION, SOLUTION INTRAMUSCULAR; INTRAVENOUS; SUBCUTANEOUS at 16:49

## 2022-12-30 RX ADMIN — Medication 30 MG: at 14:52

## 2022-12-30 RX ADMIN — PROPOFOL 20 MG: 10 INJECTION, EMULSION INTRAVENOUS at 14:00

## 2022-12-30 RX ADMIN — FENTANYL CITRATE 25 MCG: 50 INJECTION, SOLUTION INTRAMUSCULAR; INTRAVENOUS at 15:19

## 2022-12-30 RX ADMIN — Medication 50 MG: at 13:57

## 2022-12-30 RX ADMIN — PHENYLEPHRINE HYDROCHLORIDE 100 MCG: 10 INJECTION INTRAVENOUS at 14:36

## 2022-12-30 RX ADMIN — Medication 20 MG: at 15:22

## 2022-12-30 ASSESSMENT — ACTIVITIES OF DAILY LIVING (ADL)
ADLS_ACUITY_SCORE: 26
ADLS_ACUITY_SCORE: 26
ADLS_ACUITY_SCORE: 28
ADLS_ACUITY_SCORE: 26

## 2022-12-30 NOTE — ANESTHESIA CARE TRANSFER NOTE
Patient: Dinora Mcghee    Procedure: Procedure(s):  Exploratory Laparotomy, lysis of adhesions, small bowel resection. Placement of gastric jejunostomy for enteral feeding.       Diagnosis: Gastroparesis [K31.84]  Diagnosis Additional Information: No value filed.    Anesthesia Type:   General     Note:    Oropharynx: oropharynx clear of all foreign objects and spontaneously breathing  Level of Consciousness: drowsy  Oxygen Supplementation: nasal cannula  Level of Supplemental Oxygen (L/min / FiO2): 4  Independent Airway: airway patency satisfactory and stable  Dentition: dentition unchanged  Vital Signs Stable: post-procedure vital signs reviewed and stable  Report to RN Given: handoff report given  Patient transferred to: PACU    Handoff Report: Identifed the Patient, Identified the Reponsible Provider, Reviewed the pertinent medical history, Discussed the surgical course, Reviewed Intra-OP anesthesia mangement and issues during anesthesia, Set expectations for post-procedure period and Allowed opportunity for questions and acknowledgement of understanding      Vitals:  Vitals Value Taken Time   BP     Temp     Pulse     Resp 15 12/30/22 1605   SpO2 100 % 12/30/22 1605   Vitals shown include unvalidated device data.    Electronically Signed By: NATALY Reed CRNA  December 30, 2022  4:06 PM

## 2022-12-30 NOTE — ANESTHESIA PROCEDURE NOTES
Airway       Patient location during procedure: OR       Procedure Start/Stop Times: 12/30/2022 2:01 PM  Staff -        CRNA: Mary Ann Goldstein APRN CRNA       Performed By: CRNA  Consent for Airway        Urgency: elective  Indications and Patient Condition       Indications for airway management: andrez-procedural       Induction type:intravenous       Mask difficulty assessment: 1 - vent by mask    Final Airway Details       Final airway type: endotracheal airway       Successful airway: ETT - single  Endotracheal Airway Details        ETT size (mm): 7.0       Cuffed: yes       Successful intubation technique: direct laryngoscopy       DL Blade Type: Pino 2       Grade View of Cords: 1       Adjucts: stylet       Position: Right       Measured from: lips       Secured at (cm): 22       Bite block used: None    Post intubation assessment        Placement verified by: capnometry and chest rise        Number of attempts at approach: 1       Secured with: pink tape       Ease of procedure: easy       Dentition: Intact and Unchanged    Medication(s) Administered   Medication Administration Time: 12/30/2022 2:01 PM

## 2022-12-30 NOTE — OR NURSING
Dr Kern here complete a bilateral transverse abdominal block supervising resident. Pt premedicated with Dilaudid 0.2 mg IV, time out completed and pt pre oxygenated.

## 2022-12-30 NOTE — BRIEF OP NOTE
[facility]    Brief Operative Note    Pre-operative diagnosis: Gastroparesis [K31.84]  Post-operative diagnosis Same as pre-operative diagnosis    Procedure: Procedure(s):  Exploratory Laparotomy, lysis of adhesions, small bowel resection. Placement of gastric jejunostomy for enteral feeding.  Surgeon: Surgeon(s) and Role:     * Elver Bauer MD - Primary     * Martha Rubi MD - Resident - Assisting  Anesthesia: General   Estimated Blood Loss: 25ml    Drains: Jejunostomy in the LUQ.  Specimens:   ID Type Source Tests Collected by Time Destination   1 : Proximal Small Bowel Tissue Small Intestine SURGICAL PATHOLOGY EXAM Elver Bauer MD 12/30/2022  3:03 PM      Findings:   Diffuse adhesions. One area of significant, dense adhesions requiring small bowel resection..  Complications: None.  Implants: * No implants in log *   - Admit  - PCA  - G tube (RUQ) to gravity  - J tube for essential meds only  - NPO, small amount of ice chips for comfort only  - rose

## 2022-12-30 NOTE — OR NURSING
Dr. Kern called for a pt status report and pt improvement since abdominal tap block completed and started to relieve pt's pain.  Asked and Dr Kern told me yes, he will sign out the patient and she may be prepared for transfer.  Will notify the floor nurse for report. Pt reports a pain rating of 5.

## 2022-12-30 NOTE — OR NURSING
"Pt tolerated the block with little movement and now affirms pain to be a 6 while verbalizes feeling like she\" can take a deeper breath without a lot of  Pain\".  "

## 2022-12-30 NOTE — ANESTHESIA POSTPROCEDURE EVALUATION
Patient: Dinora Mcghee    Procedure: Procedure(s):  Exploratory Laparotomy, lysis of adhesions, small bowel resection. Placement of gastric jejunostomy for enteral feeding.       Anesthesia Type:  General    Note:  Disposition: Inpatient; Admission   Postop Pain Control: Uneventful            Sign Out: Well controlled pain   PONV: No   Neuro/Psych: Uneventful            Sign Out: Acceptable/Baseline neuro status   Airway/Respiratory: Uneventful            Sign Out: Acceptable/Baseline resp. status   CV/Hemodynamics: Uneventful            Sign Out: Acceptable CV status; No obvious hypovolemia; No obvious fluid overload   Other NRE: NONE   DID A NON-ROUTINE EVENT OCCUR? No           Last vitals:  Vitals Value Taken Time   /75 12/30/22 1745   Temp     Pulse 89 12/30/22 1750   Resp 4 12/30/22 1750   SpO2 100 % 12/30/22 1750   Vitals shown include unvalidated device data.    Electronically Signed By: Enoc Kern MD  December 30, 2022  5:51 PM

## 2022-12-30 NOTE — ANESTHESIA PROCEDURE NOTES
"TAP Procedure Note    Pre-Procedure   Staff -        Anesthesiologist:  Enoc Kern MD       Resident/Fellow: Jovan Mc MD       Performed By: resident       Location: post-op       Pre-Anesthestic Checklist: patient identified, IV checked, site marked, risks and benefits discussed, informed consent, monitors and equipment checked, pre-op evaluation, at physician/surgeon's request and post-op pain management  Timeout:       Correct Patient: Yes        Correct Procedure: Yes        Correct Site: Yes        Correct Position: Yes        Correct Laterality: Yes        Site Marked: Yes  Procedure Documentation  Procedure: TAP       Laterality: bilateral       Patient Position: supine       Patient Prep/Sterile Barriers: sterile gloves, mask       Skin prep: Chloraprep       Needle Type: short bevel       Needle Gauge: 21.        Needle Length (millimeters): 110        Ultrasound guided       1. Ultrasound was used to identify targeted nerve, plexus, vascular marker, or fascial plane and place a needle adjacent to it in real-time.       2. Ultrasound was used to visualize the spread of anesthetic in close proximity to the above referenced structure.       3. A permanent image is entered into the patient's record.    Assessment/Narrative         The placement was negative for: blood aspirated, painful injection and site bleeding       Paresthesias: No.       Bolus given via needle. no blood aspirated via catheter.        Secured via.        Insertion/Infusion Method: Single Shot       Complications: none       Injection made incrementally with aspirations every 5 mL.    Medication(s) Administered   Bupivacaine 0.25% PF (Infiltration) - Infiltration   20 mL - 12/30/2022 5:34:00 PM  Bupivacaine liposome (Exparel) 1.3% LA inj susp (Infiltration) - Infiltration   20 mL - 12/30/2022 5:35:00 PM    FOR [facility] ([facility]) ONLY:   Pain Team Contact information: please page the Pain Team Via BBspace. Search \"Pain\". " During daytime hours, please page the attending first. At night please page the resident first.

## 2022-12-31 ENCOUNTER — APPOINTMENT (OUTPATIENT)
Dept: OCCUPATIONAL THERAPY | Facility: CLINIC | Age: 57
End: 2022-12-31
Attending: SURGERY
Payer: COMMERCIAL

## 2022-12-31 LAB
ANION GAP SERPL CALCULATED.3IONS-SCNC: 10 MMOL/L (ref 7–15)
BUN SERPL-MCNC: 5.8 MG/DL (ref 6–20)
CALCIUM SERPL-MCNC: 9 MG/DL (ref 8.6–10)
CHLORIDE SERPL-SCNC: 101 MMOL/L (ref 98–107)
CREAT SERPL-MCNC: 0.57 MG/DL (ref 0.51–0.95)
DEPRECATED HCO3 PLAS-SCNC: 26 MMOL/L (ref 22–29)
ERYTHROCYTE [DISTWIDTH] IN BLOOD BY AUTOMATED COUNT: 14.1 % (ref 10–15)
GFR SERPL CREATININE-BSD FRML MDRD: >90 ML/MIN/1.73M2
GLUCOSE BLDC GLUCOMTR-MCNC: 109 MG/DL (ref 70–99)
GLUCOSE BLDC GLUCOMTR-MCNC: 112 MG/DL (ref 70–99)
GLUCOSE BLDC GLUCOMTR-MCNC: 120 MG/DL (ref 70–99)
GLUCOSE BLDC GLUCOMTR-MCNC: 120 MG/DL (ref 70–99)
GLUCOSE BLDC GLUCOMTR-MCNC: 129 MG/DL (ref 70–99)
GLUCOSE BLDC GLUCOMTR-MCNC: 143 MG/DL (ref 70–99)
GLUCOSE SERPL-MCNC: 127 MG/DL (ref 70–99)
HCT VFR BLD AUTO: 36.9 % (ref 35–47)
HGB BLD-MCNC: 12.3 G/DL (ref 11.7–15.7)
MCH RBC QN AUTO: 32.3 PG (ref 26.5–33)
MCHC RBC AUTO-ENTMCNC: 33.3 G/DL (ref 31.5–36.5)
MCV RBC AUTO: 97 FL (ref 78–100)
PLATELET # BLD AUTO: 307 10E3/UL (ref 150–450)
POTASSIUM SERPL-SCNC: 4.1 MMOL/L (ref 3.4–5.3)
RBC # BLD AUTO: 3.81 10E6/UL (ref 3.8–5.2)
SODIUM SERPL-SCNC: 137 MMOL/L (ref 136–145)
WBC # BLD AUTO: 8.2 10E3/UL (ref 4–11)

## 2022-12-31 PROCEDURE — 97110 THERAPEUTIC EXERCISES: CPT | Mod: GO | Performed by: OCCUPATIONAL THERAPIST

## 2022-12-31 PROCEDURE — 120N000002 HC R&B MED SURG/OB UMMC

## 2022-12-31 PROCEDURE — 250N000013 HC RX MED GY IP 250 OP 250 PS 637: Performed by: SURGERY

## 2022-12-31 PROCEDURE — 36415 COLL VENOUS BLD VENIPUNCTURE: CPT

## 2022-12-31 PROCEDURE — 99222 1ST HOSP IP/OBS MODERATE 55: CPT | Mod: 24 | Performed by: NURSE PRACTITIONER

## 2022-12-31 PROCEDURE — 258N000003 HC RX IP 258 OP 636: Performed by: SURGERY

## 2022-12-31 PROCEDURE — 250N000011 HC RX IP 250 OP 636

## 2022-12-31 PROCEDURE — 82310 ASSAY OF CALCIUM: CPT

## 2022-12-31 PROCEDURE — 97535 SELF CARE MNGMENT TRAINING: CPT | Mod: GO | Performed by: OCCUPATIONAL THERAPIST

## 2022-12-31 PROCEDURE — 250N000011 HC RX IP 250 OP 636: Performed by: SURGERY

## 2022-12-31 PROCEDURE — 250N000013 HC RX MED GY IP 250 OP 250 PS 637

## 2022-12-31 PROCEDURE — 250N000013 HC RX MED GY IP 250 OP 250 PS 637: Performed by: STUDENT IN AN ORGANIZED HEALTH CARE EDUCATION/TRAINING PROGRAM

## 2022-12-31 PROCEDURE — 97165 OT EVAL LOW COMPLEX 30 MIN: CPT | Mod: GO | Performed by: OCCUPATIONAL THERAPIST

## 2022-12-31 PROCEDURE — 999N000147 HC STATISTIC PT IP EVAL DEFER

## 2022-12-31 PROCEDURE — 85027 COMPLETE CBC AUTOMATED: CPT

## 2022-12-31 RX ORDER — ENOXAPARIN SODIUM 100 MG/ML
40 INJECTION SUBCUTANEOUS EVERY 24 HOURS
Status: DISCONTINUED | OUTPATIENT
Start: 2022-12-31 | End: 2023-01-09 | Stop reason: HOSPADM

## 2022-12-31 RX ORDER — ZOLMITRIPTAN 2.5 MG/1
5 TABLET, ORALLY DISINTEGRATING ORAL DAILY
Status: DISCONTINUED | OUTPATIENT
Start: 2023-01-01 | End: 2023-01-03

## 2022-12-31 RX ORDER — DIPHENHYDRAMINE HYDROCHLORIDE 50 MG/ML
25 INJECTION INTRAMUSCULAR; INTRAVENOUS EVERY 6 HOURS PRN
Status: DISCONTINUED | OUTPATIENT
Start: 2022-12-31 | End: 2023-01-09 | Stop reason: HOSPADM

## 2022-12-31 RX ORDER — ZOLMITRIPTAN 2.5 MG/1
5 TABLET, ORALLY DISINTEGRATING ORAL DAILY PRN
Status: DISCONTINUED | OUTPATIENT
Start: 2022-12-31 | End: 2022-12-31

## 2022-12-31 RX ADMIN — OMEPRAZOLE 40 MG: KIT at 10:33

## 2022-12-31 RX ADMIN — PROMETHAZINE HYDROCHLORIDE 25 MG: 6.25 SYRUP ORAL at 13:50

## 2022-12-31 RX ADMIN — DIPHENHYDRAMINE HYDROCHLORIDE 25 MG: 50 INJECTION, SOLUTION INTRAMUSCULAR; INTRAVENOUS at 01:00

## 2022-12-31 RX ADMIN — ACETAMINOPHEN 650 MG: 325 SUSPENSION ORAL at 16:35

## 2022-12-31 RX ADMIN — ZOLMITRIPTAN 2.5 MG: 2.5 TABLET, ORALLY DISINTEGRATING ORAL at 16:56

## 2022-12-31 RX ADMIN — DIPHENHYDRAMINE HYDROCHLORIDE 25 MG: 50 INJECTION, SOLUTION INTRAMUSCULAR; INTRAVENOUS at 16:24

## 2022-12-31 RX ADMIN — FAMOTIDINE 20 MG: 40 POWDER, FOR SUSPENSION ORAL at 10:33

## 2022-12-31 RX ADMIN — ENOXAPARIN SODIUM 40 MG: 40 INJECTION SUBCUTANEOUS at 12:18

## 2022-12-31 RX ADMIN — SODIUM CHLORIDE, POTASSIUM CHLORIDE, SODIUM LACTATE AND CALCIUM CHLORIDE: 600; 310; 30; 20 INJECTION, SOLUTION INTRAVENOUS at 00:11

## 2022-12-31 RX ADMIN — DIPHENHYDRAMINE HYDROCHLORIDE 25 MG: 50 INJECTION, SOLUTION INTRAMUSCULAR; INTRAVENOUS at 22:49

## 2022-12-31 RX ADMIN — PROCHLORPERAZINE EDISYLATE 10 MG: 5 INJECTION INTRAMUSCULAR; INTRAVENOUS at 01:00

## 2022-12-31 RX ADMIN — ZOLMITRIPTAN 2.5 MG: 2.5 TABLET, ORALLY DISINTEGRATING ORAL at 12:07

## 2022-12-31 RX ADMIN — ACETAMINOPHEN 650 MG: 325 SUSPENSION ORAL at 10:33

## 2022-12-31 RX ADMIN — PROCHLORPERAZINE EDISYLATE 10 MG: 5 INJECTION INTRAMUSCULAR; INTRAVENOUS at 08:36

## 2022-12-31 RX ADMIN — LEVOTHYROXINE SODIUM 137 MCG: 100 SOLUTION ORAL at 10:32

## 2022-12-31 RX ADMIN — DIPHENHYDRAMINE HYDROCHLORIDE 25 MG: 50 INJECTION, SOLUTION INTRAMUSCULAR; INTRAVENOUS at 08:33

## 2022-12-31 RX ADMIN — PROCHLORPERAZINE EDISYLATE 10 MG: 5 INJECTION INTRAMUSCULAR; INTRAVENOUS at 22:38

## 2022-12-31 RX ADMIN — PROCHLORPERAZINE EDISYLATE 10 MG: 5 INJECTION INTRAMUSCULAR; INTRAVENOUS at 16:47

## 2022-12-31 RX ADMIN — ACETAMINOPHEN 650 MG: 325 SUSPENSION ORAL at 22:38

## 2022-12-31 ASSESSMENT — ACTIVITIES OF DAILY LIVING (ADL)
IADL_COMMENTS: SO CAN ASSIST AS NEEDED
ADLS_ACUITY_SCORE: 28
ADLS_ACUITY_SCORE: 28
ADLS_ACUITY_SCORE: 30
ADLS_ACUITY_SCORE: 28
ADLS_ACUITY_SCORE: 28
PREVIOUS_RESPONSIBILITIES: MEAL PREP;HOUSEKEEPING;LAUNDRY;SHOPPING;YARDWORK;MEDICATION MANAGEMENT;FINANCES;DRIVING;WORK
ADLS_ACUITY_SCORE: 28
ADLS_ACUITY_SCORE: 30
ADLS_ACUITY_SCORE: 28

## 2022-12-31 NOTE — PLAN OF CARE
Goal Outcome Evaluation:      AOx4 fatigued, slept for the majority of shift  VSS on RA, capnography and continuous pulse ox in place without issue  LR @90/h running through R port. L PIV SL  Dilaudid PCA at 0.1/10/ 0.6 used 1.5 mg total throughout shift  Good urine output from rose over night  NPO except for ice chips ad meds  Prn benadryl and compazine administered for nausea  Not passing gas or stools  GJ tube remains clamped  J drain draining small amounts of clear brown fluid  No acute events observed

## 2022-12-31 NOTE — PROGRESS NOTES
Surgery Progress Note  12/31/2022       Subjective:  - POD#1 S/p Ex-Lap, BAUDILIO, SBR, jejunostomy tube placement   - C/o migraine  - NAEON, stable vitals, afebrile, voiding, stable Hgb, ambulating, no BM or flatus yet  - Patient want her code status to be No CPR- Pre-arrest intubation OK      Objective:  Temp:  [97.1  F (36.2  C)-99.4  F (37.4  C)] 97.2  F (36.2  C)  Pulse:  [] 99  Resp:  [10-18] 12  BP: (109-132)/(60-81) 123/60  SpO2:  [94 %-100 %] 94 %    I/O last 3 completed shifts:  In: 1275 [I.V.:1275]  Out: 1225 [Urine:1125; Emesis/NG output:100]      Gen: Awake, alert, NAD  Resp: NLB on RA  Abd: soft, nondistended, approprietly tender   Incision: c/d/I  Gastric tube (RUQ):  J-Tube (LUQ):   Ext: WWP, no edema     Labs:  Recent Labs   Lab 12/31/22  1038   WBC 8.2   HGB 12.3          Recent Labs   Lab 12/31/22  1202 12/31/22  1038 12/31/22  0815   NA  --  137  --    POTASSIUM  --  4.1  --    CHLORIDE  --  101  --    CO2  --  26  --    BUN  --  5.8*  --    CR  --  0.57  --    * 127* 120*   KASSI  --  9.0  --           Assessment/Plan:     57 year old female with h/o migraine, chronic pancreatitis S/p TPIAT,splenectomy, gastrojejunostomy, R&Y, c/b gastroparesis, malnutrition. Patient underwent Ex-Lap, BAUDILIO, SBR, and J - feeding tube placement.    12/31 This morning patient doing well, able to ambulate. In addition patient asked for her code status to be (No CPR- Pre-arrest intubation OK). Dr. Pepe (Chief resident) discussed with pt her wishes and she demonstrated understanding for the new CODE status, and that was changed based on patient wishes.     Neuro: Start home PTA [id number]   Pain: PCAd 0.2mg/ 10 min lock, S.Tylenol solution (Per J-tube)  CV: Vitals Q4hrs   PULM: I.S, ambulate as tolerated   GI/FEN:  -Start Tube feed at 10ml/Hr, do not advance rate more than once every 24 hours                - Cont PPI solution through J-Tube (LUQ)                - Resume home PTA promethazine 25mg syrup  through J- tube.    IVF: Cont LR@90 ml   : Remove Piedra catheter  Endocrine: - Appreciate endocrine team recs for glucose level management                     - Continue Levothyroxine through J-Tube   Lines: Cont PICC line care per protocol   Drains:  Gastric tube in the RUQ for gravity (Nothing per this tube)                J- Tube in the LUQ for feeding and meds   Wound: C/D/I  Activity: OOB, ambulate as tolerated   DVT Ppx: Lovenox 40 mg Q24hrs       Seen, examined, and discussed with chief resident, who will discuss with staff.  - - - - - - - - - - - - - - - - - -  Solitario Garcia MD  Surgery Resident   Pager [phone]  Text page on call surgery resident via [facility] Paging/Directory - [facility] Surgery  /[facility]

## 2022-12-31 NOTE — CONSULTS
"New In-Patient Diabetes/Hyperglycemia Management Consult  Dinora Mcghee  Age: 57 year old  MRN # [id number]   YOB: 1965    Chief Complaint: gastroparesis/malnutrition    Reason for consult: \"TIPAT\"  Consult requested by: Solitario Garcia MD    All information gathered via chart review and face-to-face interview and assessment  Professional  utilized --> No    History of Present Illness:     Dinora Mcghee is a 51 year old female with history of hyperlipidemia, hypothyroidism, pituitary adenoma, allergic rhinitis, migraines, GERD, chronic nausea and constipation, gastroparesis and chronic pancreatitis  2/2 biliary pancreatitis 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 Phoenix.      Dinora has severe gastroparesis and is now  POD#0 s/p Exploratory Laparotomy, lysis of adhesions, small bowel resection. Placement of gastric jejunostomy for enteral feeding.    IDS consulted for assistance in patient with a hx of TPAIT now started on TF.    Last dose insulin PTA:  N/A has not used insulin since 2017  Inpatient regimen at time of consult:  Medium resistance sliding scale insulin ordered   BG at time of consult: 120 mg/dL    Relevant Labs:   Na 137  K 4.1  Bicarb 26  Anion Gap 10  Creat 0.57/GFR >90  Hgb 12.3      BG trends:          Today, met with patient and family.  She is unwell, nausea with pain and migraine, feeling very hot.    She is an excellent historian.  She shares she has not used insulin since 2017 and despite some lability, she would prefer to not use insulin.  She shares her Freestyle jim with 1 noted reading of many >180 mg/dL and 1 reading in the 60's    She \"microdoses\" glucagon is BGs < 70 until in 80's 2/2 to severe gastroparesis.  In the past she has been on TF and she reports \"things always even out\".  She is quite adamant against starting insulin.     We are able to agree if her BG are consistently > 250 (she wouldn't agree to any " lower) another conversation re: insulin could be had and an option could be to start Novolog at a low scale with an Echo pen and she would possibly entertain that option.     Orders Placed This Encounter      NPO for Medical/Clinical Reasons Except for: Meds, Ice Chips    D5W-containing solutions/medications/confounders: TF - Vital 1.5 started at 10 ml/hr - no plans to increase.   Planned Procedures/surgeries: none  ELOS:  TBD    Diabetes:  Recent Labs   Lab 12/31/22  1202 12/31/22  1038 12/31/22  0815 12/31/22  0256 12/31/22  0003 12/30/22  1902   * 127* 120* 120* 109* 111*       Diabetes Type:  TPAIT - post-pancreatecomy 12/28/2016     Received 4091 islet equivalents per kg body weight. Prior to the pancreatectomy, the patient did not have diabetes.   Her hemoglobin A1c from 12/19/2016 was 4.5%.   Her mixed meal c-peptide stimulation done on 11/29/2016 showed normal glucose tolerance ( fasting C-peptide 1.1, 1 hour afer shake 3.1 and 2 hours post shake 2.4). Islet cell, insulin, and glutamic acid decarboxylase antibodies were all negative on 11/29/2016.    Diabetes Duration: 12/28/2016  Diabetes Control:   Lab Results   Component Value Date    A1C 5.4 02/17/2022    A1C 5.2 11/27/2021    A1C 5.5 09/18/2021    A1C 5.4 08/05/2021    A1C 5.7 05/12/2021    A1C 5.9 04/01/2021    A1C 5.5 12/17/2020    A1C 5.8 07/30/2020       Usual (PTA) Diabetes Regimen:     Medications: none    BG monitoring frequency:  Wears a CGM - Freestyle Yandy - check 4-10 times per day  BG trends at home: 80 - 150 on average.      Safety Kit:  glucagon  Medic Alert:  no      Ability to [place] Prescribed Regimen: TBD, Barriers exist    Diabetes Complications:  retinopathy  denies, last dilated eye exam - every 6 months  Peripheral neuropathy:  denies  Nephropathy:  denies  Gastroparesis:  +    History of DKA:   no  Able to Detect Hypoglycemia: + gets agitates - CGM will alert  Usual Diabetes Care Provider/CDCES: Dr. Melissa, PCP -  "Rossi Andrade    Factors impacting IP BG control:  Severe gastroparesis, TF, nausea    Review of Systems:    CC:  \"hot, nauseated, migraine, abd pain\"  Constitutional:   No fever, no chills  ENT/Mouth:   Hearing at level of conversation, denies hearing changes, no ear pain, no sore throat, no rhinorrhea, no difficulty swallowing  Eyes:  No eye pain, no discharge, no vision changes  CV:   No CP/SOB, no new edema  Resp:  No cough, no wheezing  GI:   ++ nausea, no vomiting, no constipation, no diarrhea  :  No dysuria, no frequency, no hematuria  Musk:  No joint swelling/pain, No back pain  Skin/heme:   No new rashes/bruises/open areas.  No pruritis  Neuro:   No new weakness, denies numbness/tingling, ++ headache  Psych:   No new anxiety, denies changes/worsening mood concerns  Endocrine:  No polyuria, no polydipsia      Past medical, family and social histories are reviewed and updated.    Past Medical History  Past Medical History:   Diagnosis Date     Allergic rhinitis, cause unspecified      Allergy to other foods     strawberries, apples, celeries, alice, watermelon     Arthritis     left knee     Choledocholithiasis     long after cholecystectomy     Chronic abdominal pain      Chronic constipation      Chronic nausea      Chronic pancreatitis (H)      Degeneration of lumbar or lumbosacral intervertebral disc      Esophageal reflux     w/ hiatal hernia     Gastroparesis      Hiatal hernia      History of pituitary adenoma     s/p resection     Hypothyroidism      Migraines      Mild hyperlipidemia      On tube feeding diet     presence of GJ tube     Pancreatic disease     PD stricture, 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      PONV (postoperative nausea and vomiting)      Portacath in place      Type 1 diabetes mellitus without complication (H) 5/9/2022     Unspecified hearing loss     25% high frequency R       Family History  Family History   Problem Relation Age of Onset     Lipids Mother      " Hypertension Mother      Thyroid Disease Mother      Depression Mother      Angina Mother      GERD Mother      Skin Cancer Mother      Migraines Mother      Eye Disorder Father         cataract, detached retina     Myocardial Infarction Father 60     Lipids Father      Cerebrovascular Disease Father      Depression Father      Substance Abuse Father      Anesthesia Reaction Father         stroke right after surgery     Cataracts Father      Osteoarthritis Father      Ulcerative Colitis Father      Interpersonal Violence Sister      Coronary Artery Disease Sister         angioplasty     GERD Sister      Substance Abuse Sister      Depression Sister      Thyroid Disease Sister      Eye Disorder Maternal Grandmother         cataract     Thyroid Disease Maternal Grandmother      Diabetes Maternal Grandfather      Eye Disorder Paternal Grandmother         cataract     Eye Disorder Paternal Grandfather         cataract     Diabetes Paternal Grandfather      Eye Disorder Son         ptosis     Depression Son      Anxiety Disorder Son      Heart Disease Paternal Aunt      Diabetes Paternal Aunt      Diabetes Paternal Uncle      Heart Disease Paternal Uncle      Depression Nephew      Anxiety Disorder Nephew      Thyroid Disease Nephew      Diabetes Type 2  Cousin         paternal cousin     Social History  Social History     Socioeconomic History     Marital status:      Spouse name: Norris     Number of children: 3     Years of education: 16     Highest education level: None   Occupational History     Occupation: director     Employer: [employer]   Tobacco Use     Smoking status: Former     Packs/day: 0.50     Years: 6.00     Pack years: 3.00     Types: Cigarettes     Start date: 1985     Quit date: 1992     Years since quittin.0     Smokeless tobacco: Never     Tobacco comments:     no 2nd hand   Substance and Sexual Activity     Alcohol use: Not Currently     Alcohol/week: 3.0 - 6.0 standard drinks      "Types: 1 - 2 Glasses of wine, 1 - 2 Cans of beer, 1 - 2 Shots of liquor per week     Comment: none since IGG     Drug use: No     Sexual activity: Yes     Partners: Male     Birth control/protection: None     Comment: Hystectomy 1999   Other Topics Concern     Parent/sibling w/ CABG, MI or angioplasty before 65F 55M? No     Blood Transfusions No     Seat Belt Yes   Social History Narrative     with 3 children and a dog.  No smoking, etoh or drug use.  Worked as a  for [email or web address] in [employer] in the past.  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     Physical Exam:  /63 (BP Location: Right arm)   Pulse 87   Temp 98.5  F (36.9  C) (Axillary)   Resp 14   Ht 1.702 m (5' 7\")   Wt 64.4 kg (141 lb 14.4 oz)   SpO2 97%   BMI 22.22 kg/m      General:  Pleasant, mild distress. Ice pack on head - family in room   HEENT: NC/AT, PER and anicteric, non-injected, oral mucous membranes moist.   Lungs: non-labored, no cough, no SOB/wheezing  ABD: rounded  Skin: warm and dry, no obvious lesions,   Feet: CMS intact  MSK: fluid movement of extremities   Lymp: no LE edema.   Mental status: Alert, oriented x3, communicating clearly, memory intact.  Psych: calm, appropriate mood, congruent affect.      Laboratory  Recent Labs   Lab Test 12/31/22  1202 12/31/22  1038 12/30/22  1321 12/10/22  1140 11/22/22  1121 08/01/22  1301   NA  --  137  --   --   --  142   POTASSIUM  --  4.1  --   --   --  4.1   CHLORIDE  --  101  --   --   --  107   CO2  --  26  --   --   --  28   ANIONGAP  --  10  --   --   --  7   * 127*   < >  --    < > 113*   BUN  --  5.8*  --   --   --  14   CR  --  0.57  --  0.7  --  0.67   KASSI  --  9.0  --   --   --  9.2    < > = values in this interval not displayed.     CBC RESULTS:   Recent Labs   Lab Test 12/31/22  1038   WBC 8.2   RBC 3.81   HGB 12.3   HCT 36.9   MCV 97   MCH 32.3   MCHC 33.3   RDW 14.1          Liver Function Studies - " "  Recent Labs   Lab Test 02/17/22  1115   PROTTOTAL 7.8   ALBUMIN 4.2   BILITOTAL 0.9   ALKPHOS 98   AST 23   ALT 31       Active Medications  Current Facility-Administered Medications   Medication     acetaminophen (TYLENOL) solution 650 mg     bupivacaine liposome (EXPAREL) LA inj was given in the infiltration site to produce post-op analgesia. Duration of action is up to 72 hours. Other \"sy\" meds should not be given for 96 hours except for lidocaine 4% patch. This is for INFORMATION ONLY.     glucose gel 15-30 g    Or     dextrose 50 % injection 25-50 mL    Or     glucagon injection 1 mg     diphenhydrAMINE (BENADRYL) injection 25 mg     enoxaparin ANTICOAGULANT (LOVENOX) injection 40 mg     famotidine (PEPCID) suspension 20 mg     HYDROmorphone (DILAUDID) PCA 0.2 mg/mL OPIOID NAIVE (age less than 65 years)     insulin aspart (NovoLOG) injection (RAPID ACTING)     lactated ringers infusion     levothyroxine 20 mcg/mL (THYQUIDITY) oral solution 137 mcg     lidocaine (LMX4) cream     lidocaine 1 % 0.1-1 mL     naloxone (NARCAN) injection 0.2 mg    Or     naloxone (NARCAN) injection 0.4 mg    Or     naloxone (NARCAN) injection 0.2 mg    Or     naloxone (NARCAN) injection 0.4 mg     omeprazole (FIRST-OMEPRAZOLE) suspension 40 mg     prochlorperazine (COMPAZINE) injection 10 mg    Or     prochlorperazine (COMPAZINE) tablet 10 mg    Or     prochlorperazine (COMPAZINE) suppository 25 mg     promethazine (PHENERGAN) syrup 25 mg     scopolamine (TRANSDERM-SCOP) Patch in Place     sodium chloride (PF) 0.9% PF flush 3 mL     sodium chloride (PF) 0.9% PF flush 3 mL     ZOLMitriptan (ZOMIG-ZMT) ODT tab 5 mg     No current outpatient medications on file.       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 Phoenix). NOT insulin dependent.  A1c 5.4 5/2022  2)  Severe gastroparesis now s/p s/p Exploratory Laparotomy, lysis of adhesions, small " bowel resection. Placement of gastric jejunostomy for enteral feeding.  3)  Malnutrition   4)  Migraines  5)  Nausea  6)        Plan:      -  Patient is declining option of insulin at this time.   Should BG consistently be >250 she would like to re-visit with a conversation first and would consider the use of Novolog Echo pen PRN     -  BG monitoring q4h     -  Ok to wear Freestyle - must continue to do POC BG monitoring as ordered.  CGM not to be used for decision-making.     -  PTA meds:  N/A     -  Hypoglycemia protocol    -  Recommend carb counting protocol    -  Education :  TBD     -  Outpatient follow up:  Continue with Dr. Melissa         -  Thank you for this consult, IDS will NOT follow.  In the event the BG do trend upward and/or patient changes her mind and would like to accept insulin, we could be happy to re-follow.  Please reach out and contact out service.       NATALY Dennison CNP   Inpatient Diabetes Management Service  Pager - [phone]      To contact Endocrine Diabetes service:   From 8AM-4PM: page inpatient diabetes provider that is following the patient that day (see filed or incomplete progress notes/consult notes).  If uncertain of provider assignment: page job code [phone] *'s,  [phone] then enter [phone].  For questions or updates from 4PM-8AM: page the diabetes job code for on call fellow: [phone]    Please notify inpatient diabetes service if changes are planned to steroids, nutrition, or if procedures are planned requiring prolonged NPO status.Diabetes Management Team job code: [phone]       I spent a total of 80 minutes bedside and on the inpatient unit managing glycemic care.  Over 50% of my time on the unit was spent counseling the patient and/or coordinating care regarding acute hyperglycemic management.  See note for details.

## 2022-12-31 NOTE — PLAN OF CARE
"Goal Outcome Evaluation:  Arrived on 7B approximately 1850. VSS. Alert, oriented and conversational. Abdominal dressing CDI. Small amount of bloody drainage from \"jejunal\" drain at site of insertion, drainage olive green/brown. G/J site clean and dry, no dressing. Patient rating abdominal pain 7-8/10.  Stat order for Dilaudid 0.3mg while PCA orders set up. Patient came to unit without PCA. Family at bedside. Pt slightly nauseate. She says when Compazine is given she also needs to have Benadryl at the same time. Requested IV Benadryl order since Benadryl solution to give in j tube not on unit.                        "

## 2022-12-31 NOTE — PROGRESS NOTES
CLINICAL NUTRITION SERVICES - ASSESSMENT NOTE     Nutrition Prescription    RECOMMENDATIONS FOR MDs/PROVIDERS TO ORDER:  1. Please send appropriate consult for RD to Assess and Order TF per MNT protocol to allow RD to initiate TF      Malnutrition Status:    Severe malnutrition in the context of chronic condition     Recommendations already ordered by Registered Dietitian (RD):  1. Enteral Nutrition support recommendation:   - Access: Jejunostomy tube of the G/J tube ( placed 12/30/22)  - Dosing wt: admit wt of 64 kg      - TF: Ordered to begin TF with vital 1.5 @ 10 ml/hr with no advancement rate.   Further advancement per MD    - Free water flushes: 30 ml Q4 hrs for tube patency.    - RELIZORB CARTRIDGES  *Change 1 cartridge every 24 hours with TF rate @ 10-20 ml/hr  *Change 1 cartridge every 12 hours with TF rate >20 ml/hr  *Supplies: Obtain cartridges in the 7A unit med room or call [phone] and provide peoplesoft #[phone]      Future/Additional Recommendations:  Once able advance Vital 1.5 by 10 ml Q 8hr as tolerated to goal @ 45 ml/hr.  Do not start or advance unless K+ >3.0, Mg++ > 1.5,  and Phos > 1.9.    Vital 1.5 at goal 45 ml/hr (1080ml/day) to provide: 1620 kcals (25 kcal/kg), 72 gm PRO (1.1 gm/kg), 825 ml free H20, 201 gm CHO, and 6 gm soluble fiber daily.     Modules:  Prosource TF 20:  1 packet daily  - TF + Prosource: 1700 kcal/day ( 27 kcal/kg) + 92 gm protein/day ( 1.4 gm/kg).     - Multivitamin/mineral: Certavite 15 ml/day via FT.   - K+, Mg, Phos ordered until TF advances to goal rate for evaluation of refeeding     - When TF running continuous, attach 1 relizorb cartridge every 12 hours.         REASON FOR ASSESSMENT  Dinora Mcghee is a/an 57 year old female assessed by the dietitian for Provider Order - Tube feeds per Dr. Bauer to start on 12/31 at 10/hr. 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   - Choledocholithiasis after cholecystectomy,  - Hiatal hernia with esophageal reflux,   - Pituitary adenoma s/p resection,   - Chronic constipation, Gastroparesis with ongoing symptoms of N/V/abdominal pain and FTT.     - 22: Pre-op diagnosis: Gastroparesis, Now POD#1 s/p Exploratory Laparotomy, lysis of adhesions, small bowel resection. Placement of gastric jejunostomy for enteral feeding.    - GI/stool: not passing gas or stool per chart review      NUTRITION HISTORY  Patient reports she is dependent on tube feeds due to severe gastroparesis. Her tube feeds has not been working for the past 2 month. Patient has been consuming simple foods such as mashed potatoes. She has been augmenting her intake with premier one oral supplements 1-2 per day. Last tube feeds was early in November.     Patient reports her home tube feed is Vital 1.5  She uses Relizorb with her TFs.  Patient reports significant constipation with or without tube feeds       CURRENT NUTRITION ORDERS  Nutrition:   Diet: Diet: NPO for Medical/Clinical Reasons Except for: Meds, Ice Chips   FT: G to gravity, J tube clamped.   Fluids: MIVF    Intake/Tolerance: NPO since admit     Food allergies: Apples (anaphylaxis), per review of allergies.  Per MD note: Strawberries, celeries, alice, watermelon (not listed under active allergies).       LABS  K+: 4.1, Mg++/phos: N/A   BUN: 14, Cr: 0.67, GFR: >90  B (H), A1C: 5.4 ( last on 22), C.peptide: 2.5 (normal on 22)     Vitamin A: 0.61 ()  Vitamin D: recent N/A   Vitamin E: 13.6 (22)      MEDICATIONS  Insulin sub Q ever 4 hours ( not started)  Levothyroxine per J-tube ( not started)       ANTHROPOMETRICS  Height: 170.2 cm (5' 7\")  Most Recent Weight: 63.7 kg (140 lb 6.9 oz) on 22  IBW: 61.4 kg " ( 104% IBW)  BMI: 21.99 kg /m2  Normal BMI  Weight History: 2.3% net wt loss in 1 week   Wt Readings from Last 10 Encounters:   12/30/22 63.7 kg (140 lb 6.9 oz)   12/22/22 65.2 kg (143 lb 12.8 oz)   12/09/22 65.9 kg (145 lb 4.5 oz)   12/01/22 65.6 kg (144 lb 9.6 oz)   11/22/22 64.5 kg (142 lb 3.2 oz)   11/15/22 64 kg (141 lb)   11/02/22 65.5 kg (144 lb 4.8 oz)   09/08/22 66.2 kg (146 lb)   09/07/22 66.5 kg (146 lb 9.6 oz)   08/01/22 65.1 kg (143 lb 8 oz)       Dosing Weight: 64 kg admit standing wt on admit 12/30/22    ASSESSED NUTRITION NEEDS  Estimated Energy Needs: 1600- 1920 kcals/day (25 - 30 kcals/kg)  Justification: Maintenance  Estimated Protein Needs: 77- 96  grams protein/day (1.2 - 1.5 grams of pro/kg)  Justification: Repletion  Estimated Fluid Needs: (1 mL/kcal)   Justification: Maintenance    PHYSICAL FINDINGS  See malnutrition section below.    MALNUTRITION  % Intake: </= 50% for >/= 5 days (severe)  % Weight Loss: > 2% in 1 week (severe)  Subcutaneous Fat Loss: Facial region:  mild  Muscle Loss: Temporal:  mild and Thoracic region (clavicle, acromium bone, deltoid, trapezius, pectoral):  mild  Fluid Accumulation/Edema: None noted  Malnutrition Diagnosis: Severe malnutrition in the context of chronic condition       NUTRITION DIAGNOSIS  Altered GI function related to severe delayed gastric emptying with associated N/V as evidenced by patient with limited PO for the past 2 month, reliant on TF to provide full nutrition needs       INTERVENTIONS  Implementation  Nutrition Education: Role of RD in nutrition plan of care   Enteral Nutrition - Initiate  Feeding tube flush     Goals  Total avg nutritional intake to meet a minimum of 25 kcal/kg and 1.2 gm PRO/kg daily (per dosing wt 64 kg admit standing wt ).     Monitoring/Evaluation  Progress toward goals will be monitored and evaluated per protocol.      Henry Jacobson RD/ROBBIE  7B RD pager: [phone]  Weekend/Holiday RD pager: [phone]

## 2022-12-31 NOTE — PLAN OF CARE
PT 7B: cancel/defer    PT order received. Pt reports IND at baseline, limited this date by nausea and headache. Per OT, pt mobilizing SBA to CGA however unable to progress ambulation due to nausea. Anticipate all needs met by 1 discipline and return to home with assist once medically ready. PT to sign off.

## 2022-12-31 NOTE — PLAN OF CARE
Goal Outcome Evaluation:      Plan of Care Reviewed With: patient    Overall Patient Progress: improvingOverall Patient Progress: improving    Outcome Evaluation: starting TF at 10 ml/hr

## 2022-12-31 NOTE — PROGRESS NOTES
"Brief Post Operative Progress Note      Subjective:  12/30/2022    Dinora Mcghee is a 57 year old female with h/o H1DM, pancreatic disease(sphincter of Oddi dysfunction), gastroparesis, hiatal hernia, chronic pancreatitis, choledocolithiasis after cholecystectomy, pituitary adenoma, migraines, PONY now POD#0 s/p Exploratory Laparotomy, lysis of adhesions, small bowel resection. Placement of gastric jejunostomy for enteral feeding.  Past Medical History:   Diagnosis Date     Allergic rhinitis, cause unspecified      Allergy to other foods     strawberries, apples, celeries, alice, watermelon     Arthritis     left knee     Choledocholithiasis     long after cholecystectomy     Chronic abdominal pain      Chronic constipation      Chronic nausea      Chronic pancreatitis (H)      Degeneration of lumbar or lumbosacral intervertebral disc      Esophageal reflux     w/ hiatal hernia     Gastroparesis      Hiatal hernia      History of pituitary adenoma     s/p resection     Hypothyroidism      Migraines      Mild hyperlipidemia      On tube feeding diet     presence of GJ tube     Pancreatic disease     PD stricture, 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 Denies SOB, chest pain, or dizziness, nausea or vomiting. No flatus and BM.     Objective:  /66 (BP Location: Right arm)   Pulse 94   Temp 99.4  F (37.4  C) (Axillary)   Resp 18   Ht 1.702 m (5' 7\")   Wt 63.7 kg (140 lb 6.9 oz)   SpO2 99%   BMI 21.99 kg/m      GEN: AAO*3, not in acute distress, lying comfortably  HEENT: atraumatic, mucosa moist  CVS: normal heart rate, perfusing extremeties  RESP: no resp distress, satting well on RA>  ABD: soft, appropriately tender, nondistended, incision CDI with dressing, SS drainage, G tube and J tube " in place  : normal female external genitalia, rose in place  EXT: distal pulses palpable, normal range of motion  NEURO/PSYCH: CN grossly normal, no focal weakness, normal mood and thought process       Assessment and Plans:     Dinora Mcghee is a 57 year old female POD#0 s/p Exploratory Laparotomy, lysis of adhesions, small bowel resection. Placement of gastric jejunostomy for enteral feeding. Doing well post operatively. Continue plan of care.  -Pain control: continue current pain regimen, PCA  -Diet: NPO for Medical/Clinical Reasons Except for: Meds, Ice Chips   -Tubes: G to gravity, J for essential meds only  -Fluids: mIVF  -Activity: Ad koffi      Segun Romero MD  PGY-1 Urology  [email or web address]

## 2022-12-31 NOTE — PROVIDER NOTIFICATION
Team paged; JOSE RAMON Mcghee, 7B, 31-2, has a PRN Zolmitriptan 5 mg daily, but took a 2.5 mg at 1207 instead of 5 mg, and is now requesting the remaining 2.5 mg, what do you think? Thanks, Cinthia RN, #[phone].

## 2022-12-31 NOTE — OP NOTE
[facility]    Operative Note    Pre-operative diagnosis: Severe gastroparesis, severe malnutrition, intolerance of tube feedings via gastric route.   Post-operative diagnosis Severe gastroparesis, severe malnutrition, intolerance of tube feedings via gastric route. Marked upper abdominal adhesions   Procedure: Exploratory laparotomy with extensive lysis of adhesions, small bowel resection, placement of jejunostomy feeding tube   Surgeon: Surgeon(s) and Role:     * Elver Bauer MD - Primary     * Martha Rubi MD - Resident - Assisting   Anesthesia: General    Estimated blood loss: 25ml   Drains: Jejunostomy in the LUQ.   Specimens: ID Type Source Tests Collected by Time Destination   1 : Proximal Small Bowel Tissue Small Intestine SURGICAL PATHOLOGY EXAM Elver Bauer MD 12/30/2022  3:03 PM       Findings:  Diffuse adhesions. One area of significant, dense adhesions requiring small bowel resection..   Complications: None.   Implants: Feeding tube (12 fr gastostomy tube placed into small bowel) in LLQ     INDICATIONS FOR PROCEDURE  Dinora Mcghee is a 57 year old female who has previous TPAIT she has developed feeding intolerance and difficulties with gastroparesis. She has had complications related to prior endoscopic jejunostomy tube. She presents for surgical jejunstomy tube placement.    After understanding the risks and benefits of proceeding, she agreed to the above operation.    General risks related to abdominal surgery were reviewed with the patient.    These include, but are not limited to, death, myocardial infarction, pneumonia, urinary tract infection, deep venous thrombosis with or without pulmonary embolus, abdominal infection from bowel injury or abscess, bowel obstruction, wound infection, and bleeding.    OPERATIVE PROCEDURE:    The patient was brought to the operating room and placed in the supine position with  appropriate padding for all of the pressure points. A surgical safety checklist was performed to confirm the patient's identity, the site and laterality of the procedure. Perioperative antibiotics were provided.  VTE prophylaxis was provided with serial compression devices. The abdomen was then prepped and draped in the usual sterile fashion.    A midline laparotomy was performed. The abdomen was entered cautiously to avoid any bowel injuries. Previous placed mesh was transected in order to enter the abdominal cavity. Adhesions to the abdominal wall were carefully divided using a combination of sharp dissection and electrocautery. Patient had numerus intraloop adhesions particularly in the proximal small bowel. Lysis of adhesions was performed using a combination of sharp and blunt dissection until the small bowel was able to be run in its entirety and post-TPIAT anatomy was confidently identified. Lysis took 90 minutes. There was an area about 20 cm distal to the JJ anastomosis there was a segment of small bowel that had dense adhesions creating hairpin turns and twisting of the small bowel. A resection of this segment  (approx 15 cm) was performed. Mesenteric window were made proximally and distally and small bowel divided with SIRI 55mm blue load. Mesentery was divided with ligasure and the specimen was handed off the field. The two ends of small bowel were oriented in an isoperistaltic manner. 3-0 silk stay sutures were placed. Enterotomies were made and SIRI 55mm blue load was used to create the anastomosis. Common enterotomy was closed with 3-0 silk. Mesenteric defect closed with 3-0 silk. A site 20 cm distal to this anastomosis was chosen for the jejunostomy. Pursestring suture was placed at the antimesenteric site. The jejeunostomy tube was passed through the abdominal with exit poin in the LUQ. A 12Fr gastrostomy tube was used with the balloon lanced in order to prevent inflation and obstruction. Enterotomy  was made at the center of the pursestring and tube was passed distally. 3-0 vicryl was used to affix the tube at the depth of 10cm to the jejunum. Additionally, 3-0 vicryl was used to secure the proximal and distal small bowel to the abdominal wall in order to prevent twisting.  New jejunostomy tube was secured to skin using 2-0 nylon. Abdomen was examined for hemostasis. The fascia was closed using 0-looped PDS.  The skin was closed with skin staples and sterile dressings were applied.    The patient tolerated the procedure well. The sponge, needle, instrument counts were correct.  The patient was then awakened and taken to recovery in stable fashion.    Dr. Bauer was present and scrubbed for the above procedure.    Martha Rubi MD, MS  General Surgery, PGY-7  Pg [phone]    I was present, scrubbed, and participated in entire procedure.    GB

## 2022-12-31 NOTE — PROGRESS NOTES
"Goal outcome evaluation:    Plan of Care Reviewed with: Patient  Overall Patient Progress: No change    VS /60 (BP Location: Right arm)   Pulse 99   Temp 97.2  F (36.2  C) (Oral)   Resp 12   Ht 1.702 m (5' 7\")   Wt 64.4 kg (141 lb 14.4 oz)   SpO2 94%   BMI 22.22 kg/m     Activity: SBA  Neuros: A&O x4. Able to make needs known.  Cardiac: WDL. Denies cardiac chest pain.  Respiratory: WDL. RA. Denies SOB  GI/: Voiding via Piedra. No BM this shift. -flatus  Diet: NPO ex. meds & ice chips. Continuous TF @ 10 mL/hr. Started @ 1400  Skin/Incisions: Midline abd incision covered w/ primapore. Dried drainage marked.   Lines/Drains: G tube to gravity. J tube to TF + meds  Labs: Reviewed.  Pain/nausea: 5-8/10 pain. Managed w/ PCA dilaudid.  "

## 2022-12-31 NOTE — PROGRESS NOTES
12/31/22 1200   Appointment Info   Signing Clinician's Name / Credentials (OT) jhonny gabby ot/l   Living Environment   People in Home significant other   Current Living Arrangements house   Home Accessibility stairs to enter home   Number of Stairs, Main Entrance 4   Transportation Anticipated family or friend will provide;car, drives self   Self-Care   Usual Activity Tolerance good   Current Activity Tolerance moderate   Regular Exercise Other (see comments)  (previous triathelete)   Equipment Currently Used at Home none   Fall history within last six months no   Instrumental Activities of Daily Living (IADL)   Previous Responsibilities meal prep;housekeeping;laundry;shopping;yardwork;medication management;finances;driving;work   IADL Comments SO can assist as needed   General Information   Referring Physician Yvette Rubi   Patient/Family Therapy Goal Statement (OT) return to PLOF   Additional Occupational Profile Info/Pertinent History of Current Problem Dinora Mcghee is a 57 year old female with h/o H1DM, pancreatic disease(sphincter of Oddi dysfunction), gastroparesis, hiatal hernia, chronic pancreatitis, choledocolithiasis after cholecystectomy, pituitary adenoma, migraines, PONY now POD#0 s/p Exploratory Laparotomy, lysis of adhesions, small bowel resection   Existing Precautions/Restrictions abdominal   General Observations and Info pt motivated in therapy, nausea limiting today.   Cognitive Status Examination   Orientation Status orientation to person, place and time   Cognitive Status Comments no concerns.   Visual Perception   Visual Impairment/Limitations WNL   Sensory   Sensory Quick Adds sensation intact   Range of Motion Comprehensive   General Range of Motion no range of motion deficits identified   Strength Comprehensive (MMT)   General Manual Muscle Testing (MMT) Assessment other (see comments)   Comment, General Manual Muscle Testing (MMT) Assessment overall decondtioning   Coordination    Upper Extremity Coordination No deficits were identified   Bed Mobility   Bed Mobility other (see comments)   Comment (Bed Mobility) education required.   Transfers   Transfers bed-chair transfer;sit-stand transfer   Transfer Skill: Bed to Chair/Chair to Bed   Bed-Chair [place] (Transfers) contact guard   Sit-Stand Transfer   Sit-Stand [place] (Transfers) contact guard   Balance   Balance Assessment no deficits were identified   Activities of Daily Living   BADL Assessment/Intervention lower body dressing   Lower Body Dressing Assessment/Training   Comment, (Lower Body Dressing) education required.   [place] Level (Lower Body Dressing) maximum assist (25% patient effort)   Clinical Impression   Criteria for Skilled Therapeutic Interventions Met (OT) Yes, treatment indicated   OT Diagnosis decreased ADL I   Assessment of Occupational Performance 3-5 Performance Deficits   Identified Performance Deficits dressing, bathing, toileting, home making, work, leisure.   Planned Therapy Interventions (OT) ADL retraining;IADL retraining;bed mobility training;strengthening;transfer training;home program guidelines;progressive activity/exercise;risk factor education   Clinical Decision Making Complexity (OT) low complexity   Risk & Benefits of therapy have been explained evaluation/treatment results reviewed;care plan/treatment goals reviewed;risks/benefits reviewed;current/potential barriers reviewed;participants voiced agreement with care plan;participants included;patient;spouse/significant other   Clinical Impression Comments pt presents to OT with post surgical precautions, pain and deconditioning limiting ADL I.   OT Total Evaluation Time   OT Eval, Low Complexity Minutes (70456) 5   OT Goals   Therapy Frequency (OT) 5 times/wk   OT Predicted Duration/Target Date for Goal Attainment 01/06/23   OT Goals Lower Body Dressing;Bed Mobility;Toilet Transfer/Toileting   OT: Lower Body Dressing Modified  independent;within precautions;including set-up/clothing retrieval   OT: Bed Mobility Modified independent;supine to/from sitting;within precautions   OT: Toilet Transfer/Toileting Modified independent;toilet transfer;using adaptive equipment   Self-Care/Home Management   Self-Care/Home Mgmt/ADL, Compensatory, Meal Prep Minutes (48176) 10   Treatment Detail/Skilled Intervention Pt educated in abdominal precautions in relation to ADL and funcitonal transfers as well as dressing and bed mobility. Pt demo's understanding and completes bed mobility with min assist.   Therapeutic Procedures/Exercise   Therapeutic Procedure: strength, endurance, ROM, flexibillity minutes (36062) 10   Treatment Detail/Skilled Intervention Pt up in room and CGA to SBA with mobility up to chair and in room. pt trasnfer to chair, sittingapprox 5 min before requesting return to supine after nausea.   OT Discharge Planning   OT Plan ambulation, stairs, dressing within precautions   OT Discharge Recommendation (DC Rec) home with assist   OT Rationale for DC Rec pt progressing well.   OT Brief overview of current status pt SAB with mobiltiy today, limited mainly by pain and nausea.   Total Session Time   Timed Code Treatment Minutes 20   Total Session Time (sum of timed and untimed services) 25

## 2022-12-31 NOTE — OR NURSING
Transporter her to escort pt via stretcher to room [id number] 31-1.  Pt states pain score 4. Pt is wearing her glaSSES and visiting with staff.

## 2023-01-01 LAB
ERYTHROCYTE [DISTWIDTH] IN BLOOD BY AUTOMATED COUNT: 14 % (ref 10–15)
GLUCOSE BLDC GLUCOMTR-MCNC: 123 MG/DL (ref 70–99)
GLUCOSE BLDC GLUCOMTR-MCNC: 128 MG/DL (ref 70–99)
GLUCOSE BLDC GLUCOMTR-MCNC: 140 MG/DL (ref 70–99)
GLUCOSE BLDC GLUCOMTR-MCNC: 153 MG/DL (ref 70–99)
GLUCOSE BLDC GLUCOMTR-MCNC: 169 MG/DL (ref 70–99)
HCT VFR BLD AUTO: 35.9 % (ref 35–47)
HGB BLD-MCNC: 11.5 G/DL (ref 11.7–15.7)
MAGNESIUM SERPL-MCNC: 1.5 MG/DL (ref 1.7–2.3)
MCH RBC QN AUTO: 31.7 PG (ref 26.5–33)
MCHC RBC AUTO-ENTMCNC: 32 G/DL (ref 31.5–36.5)
MCV RBC AUTO: 99 FL (ref 78–100)
PHOSPHATE SERPL-MCNC: 2.7 MG/DL (ref 2.5–4.5)
PLATELET # BLD AUTO: 273 10E3/UL (ref 150–450)
RBC # BLD AUTO: 3.63 10E6/UL (ref 3.8–5.2)
WBC # BLD AUTO: 8.6 10E3/UL (ref 4–11)

## 2023-01-01 PROCEDURE — 258N000003 HC RX IP 258 OP 636: Performed by: SURGERY

## 2023-01-01 PROCEDURE — 250N000013 HC RX MED GY IP 250 OP 250 PS 637

## 2023-01-01 PROCEDURE — 250N000013 HC RX MED GY IP 250 OP 250 PS 637: Performed by: SURGERY

## 2023-01-01 PROCEDURE — 250N000011 HC RX IP 250 OP 636

## 2023-01-01 PROCEDURE — 250N000009 HC RX 250

## 2023-01-01 PROCEDURE — 250N000011 HC RX IP 250 OP 636: Performed by: SURGERY

## 2023-01-01 PROCEDURE — 83735 ASSAY OF MAGNESIUM: CPT | Performed by: SURGERY

## 2023-01-01 PROCEDURE — 85027 COMPLETE CBC AUTOMATED: CPT

## 2023-01-01 PROCEDURE — 36591 DRAW BLOOD OFF VENOUS DEVICE: CPT

## 2023-01-01 PROCEDURE — 84100 ASSAY OF PHOSPHORUS: CPT | Performed by: SURGERY

## 2023-01-01 PROCEDURE — 120N000002 HC R&B MED SURG/OB UMMC

## 2023-01-01 RX ORDER — CAFFEINE CITRATE 20 MG/ML
5 SOLUTION ORAL DAILY
Status: DISCONTINUED | OUTPATIENT
Start: 2023-01-01 | End: 2023-01-02

## 2023-01-01 RX ORDER — SCOLOPAMINE TRANSDERMAL SYSTEM 1 MG/1
1 PATCH, EXTENDED RELEASE TRANSDERMAL ONCE
Status: COMPLETED | OUTPATIENT
Start: 2023-01-01 | End: 2023-01-02

## 2023-01-01 RX ADMIN — ACETAMINOPHEN 650 MG: 325 SUSPENSION ORAL at 06:40

## 2023-01-01 RX ADMIN — DIPHENHYDRAMINE HYDROCHLORIDE AND ZINC ACETATE: 10; 1 CREAM TOPICAL at 21:16

## 2023-01-01 RX ADMIN — SCOPALAMINE 1 PATCH: 1 PATCH, EXTENDED RELEASE TRANSDERMAL at 11:01

## 2023-01-01 RX ADMIN — DIPHENHYDRAMINE HYDROCHLORIDE 25 MG: 50 INJECTION, SOLUTION INTRAMUSCULAR; INTRAVENOUS at 17:41

## 2023-01-01 RX ADMIN — FAMOTIDINE 20 MG: 40 POWDER, FOR SUSPENSION ORAL at 07:35

## 2023-01-01 RX ADMIN — DIPHENHYDRAMINE HYDROCHLORIDE AND ZINC ACETATE: 10; 1 CREAM TOPICAL at 14:09

## 2023-01-01 RX ADMIN — ACETAMINOPHEN 650 MG: 325 SUSPENSION ORAL at 11:58

## 2023-01-01 RX ADMIN — SODIUM CHLORIDE, POTASSIUM CHLORIDE, SODIUM LACTATE AND CALCIUM CHLORIDE: 600; 310; 30; 20 INJECTION, SOLUTION INTRAVENOUS at 17:31

## 2023-01-01 RX ADMIN — PROMETHAZINE HYDROCHLORIDE 25 MG: 6.25 SYRUP ORAL at 12:51

## 2023-01-01 RX ADMIN — ENOXAPARIN SODIUM 40 MG: 40 INJECTION SUBCUTANEOUS at 11:57

## 2023-01-01 RX ADMIN — PROCHLORPERAZINE EDISYLATE 10 MG: 5 INJECTION INTRAMUSCULAR; INTRAVENOUS at 17:43

## 2023-01-01 RX ADMIN — LEVOTHYROXINE SODIUM 137 MCG: 100 SOLUTION ORAL at 07:35

## 2023-01-01 RX ADMIN — CAFFEINE CITRATE 320 MG: 20 SOLUTION ORAL at 10:54

## 2023-01-01 RX ADMIN — PROCHLORPERAZINE EDISYLATE 10 MG: 5 INJECTION INTRAMUSCULAR; INTRAVENOUS at 06:57

## 2023-01-01 RX ADMIN — DIPHENHYDRAMINE HYDROCHLORIDE 25 MG: 50 INJECTION, SOLUTION INTRAMUSCULAR; INTRAVENOUS at 06:57

## 2023-01-01 RX ADMIN — ACETAMINOPHEN 650 MG: 325 SUSPENSION ORAL at 19:12

## 2023-01-01 RX ADMIN — OMEPRAZOLE 40 MG: KIT at 07:35

## 2023-01-01 RX ADMIN — DIPHENHYDRAMINE HYDROCHLORIDE AND ZINC ACETATE: 10; 1 CREAM TOPICAL at 06:40

## 2023-01-01 RX ADMIN — SODIUM CHLORIDE, POTASSIUM CHLORIDE, SODIUM LACTATE AND CALCIUM CHLORIDE: 600; 310; 30; 20 INJECTION, SOLUTION INTRAVENOUS at 06:43

## 2023-01-01 RX ADMIN — DIPHENHYDRAMINE HYDROCHLORIDE AND ZINC ACETATE: 10; 1 CREAM TOPICAL at 00:37

## 2023-01-01 RX ADMIN — ZOLMITRIPTAN 2.5 MG: 2.5 TABLET, ORALLY DISINTEGRATING ORAL at 10:14

## 2023-01-01 ASSESSMENT — ACTIVITIES OF DAILY LIVING (ADL)
ADLS_ACUITY_SCORE: 31
ADLS_ACUITY_SCORE: 30
ADLS_ACUITY_SCORE: 31
ADLS_ACUITY_SCORE: 30
ADLS_ACUITY_SCORE: 30
ADLS_ACUITY_SCORE: 31
ADLS_ACUITY_SCORE: 30
ADLS_ACUITY_SCORE: 30

## 2023-01-01 NOTE — PROGRESS NOTES
Surgery Progress Note  01/01/2023       Subjective:  - POD#2  S/p Ex-Lap, BAUDILIO, SBR, jejunostomy tube placement,   - Migraine resolved  - NAEON, stable vitals, afebrile, voiding, stable Hgb, ambulating, no BM or flatus yet  - Had skin rash mostly allergic    Objective:  Temp:  [95.6  F (35.3  C)-98.5  F (36.9  C)] 97.8  F (36.6  C)  Pulse:  [87-99] 92  Resp:  [14-18] 18  BP: (114-129)/(62-72) 114/68  SpO2:  [91 %-97 %] 97 %    I/O last 3 completed shifts:  In: 82 [I.V.:22; NG/GT:60]  Out: 2650 [Urine:2380; Emesis/NG output:270]      Gen: Awake, alert, NAD  Resp: NLB on RA  Abd: soft, nondistended, approprietly tender   Incision: c/d/I  Gastric tube (RUQ): In place, out put 170/100  J-Tube (LUQ): In place  Ext: WWP, no edema     Labs:  Recent Labs   Lab 01/01/23  0827 12/31/22  1038   WBC 8.6 8.2   HGB 11.5* 12.3    307       Recent Labs   Lab 01/01/23  0827 01/01/23  0733 01/01/23  0504 12/31/22 2012 12/31/22  1202 12/31/22  1038   NA  --   --   --   --   --  137   POTASSIUM  --   --   --   --   --  4.1   CHLORIDE  --   --   --   --   --  101   CO2  --   --   --   --   --  26   BUN  --   --   --   --   --  5.8*   CR  --   --   --   --   --  0.57   GLC  --  153* 123* 112*   < > 127*   KASSI  --   --   --   --   --  9.0   MAG 1.5*  --   --   --   --   --    PHOS 2.7  --   --   --   --   --     < > = values in this interval not displayed.          Assessment/Plan:     57 year old female with h/o migraine, chronic pancreatitis S/p TPIAT,splenectomy, gastrojejunostomy, R&Y, c/b gastroparesis, malnutrition. Patient underwent Ex-Lap, BAUDILIO, SBR, and J - feeding tube placement.    12/31 This morning patient doing well, able to ambulate. In addition patient asked for her code status to be (No CPR- Pre-arrest intubation OK). Dr. Pepe (Chief resident) discussed with pt her wishes and she demonstrated understanding for the new CODE status, and that was changed based on patient wishes.     01/01 Doing well, with some rash on  the LLQ, will continue to monitor.     Neuro:  Start caffeine citrate syrup through J- feeding tube                Cont home PTA [id number]     Pain: PCAd 0.2mg/ 10 min lock, S.Tylenol solution (Per J-tube)  CV: Vitals Q4hrs   PULM: I.S, ambulate as tolerated   GI/FEN:  -Advance Tube feed to 20 ml/hr, do not advance rate more than once every 24 hours                - Cont PPI solution through J-Tube (LUQ)                - Cont home PTA promethazine 25mg syrup through J- tube.    IVF: Cont LR@90 ml   Endocrine: - Appreciate endocrine team recs for glucose level management                     - Continue Levothyroxine through J-Tube   Lines: Cont PICC line care per protocol   Drains:  Gastric tube in the RUQ for gravity (Nothing per this tube)                J- Tube in the LUQ for feeding and meds   Wound: Remove dressing, open to air   Activity: OOB, ambulate as tolerated   DVT Ppx: Lovenox 40 mg Q24hrs       Seen, examined, and discussed with chief resident, who will discuss with staff.  - - - - - - - - - - - - - - - - - -  Solitario Garcia MD  Surgery Resident   Pager [phone]  Text page on call surgery resident via [facility] Paging/Directory - [facility] Surgery  /[facility]

## 2023-01-01 NOTE — PROVIDER NOTIFICATION
Team paged; JOSE RAMON Mcghee, 7B 31-2, is requesting for typical cream for itch. She Benadryl Q6 PRN. Thanks; MONICA Vicente. #[phone].

## 2023-01-01 NOTE — PROGRESS NOTES
"VS /68 (BP Location: Right arm)   Pulse 92   Temp 97.8  F (36.6  C) (Oral)   Resp 18   Ht 1.702 m (5' 7\")   Wt 64.4 kg (141 lb 14.4 oz)   SpO2 97%   BMI 22.22 kg/m     Activity: SBA  Neuros: A&O x4. Able to make needs known.  Cardiac: WDL. Denies cardiac chest pain.  Respiratory: WDL. RA. Denies SOB  GI/: Took out Piedra @ 1100. No BM this shift. -flatus  Diet: NPO ex. meds & ice chips. Continuous TF @ 20 mL/hr. Started @ 1300  Skin/Incisions: Midline abd incision - stapled, JACKIE.   Lines/Drains: G tube to gravity. J tube to TF + meds  Labs: Reviewed.  Pain/nausea: 5-8/10 pain. Managed w/ PCA dilaudid.  New changes this shift: Caffeine ordered per team, pt states it was effective to relieve headache.   "

## 2023-01-01 NOTE — PLAN OF CARE
"Goal Outcome Evaluation:      Plan of Care Reviewed With: patient    Overall Patient Progress: no changeOverall Patient Progress: no change         /63 (BP Location: Right arm)   Pulse 87   Temp 98.5  F (36.9  C) (Axillary)   Resp 14   Ht 1.702 m (5' 7\")   Wt 64.4 kg (141 lb 14.4 oz)   SpO2 97%   BMI 22.22 kg/m      Status: Feeding intolerance; s/p exploratory laparotomy, lysis of adhesions, small bowel resection, follow by placement of gastric jejunostomy for enteral feeding. Patient is stable and VS WDL.   Pain/Nausea: Pain is managed with PCA pump and nausea was managed with antinausea as ordered.   Mobility: Up assistance, gait belt and walker for mobility. Ambulated in the blevins/unit during this shift.   Diet: NPO, ice chips, TF is up and running at 10 mL/hr.    Labs: Reviewed.   LDAs: PEG tubes x2, L & RUQ, TF in GT (RUQ) and JT (LUQ) to gravity. Port-A-Cath in right chest, Piedra cath.   Skin/incisions: Midline abdominal incision, RUQ  And LUQ PEG tubes.   Neuro:  Intact, able to communicate needs.   Respiratory: WDL, on RA, denies SOB.   Cardiac: WDL, denies chest pain.   GI/: Continent BM, Piedra Cath in used.   New Changes: None.   Plan: Continue to monitor and follow POC.   "

## 2023-01-01 NOTE — PROGRESS NOTES
"Goal outcome evaluation:    Plan of Care Reviewed with: Patient  Overall Patient Progress: No change    VS /68 (BP Location: Right arm)   Pulse 92   Temp 97.8  F (36.6  C) (Oral)   Resp 18   Ht 1.702 m (5' 7\")   Wt 64.4 kg (141 lb 14.4 oz)   SpO2 97%   BMI 22.22 kg/m     Activity: Repositioning in bed independently. Not OOB during shift   Neuros: A&O x4. CMS intact   Cardiac: WDL. Denies cardiac chest pain.  Respiratory: Dessated on RA to high 80s; placed on 1 L NC. Satting >93%   GI/: Voiding via Piedra. No BM this shift. +flatus  Diet: NPO ex. meds & ice chips. Continuous TF @ 10 mL/hr  Skin/Incisions: Midline abd incision covered w/ primapore. Dried drainage marked.   Lines/Drains: RUQ G tube to gravity. LUQ J tube (LEAKING w/ med administration) to TF + meds  Labs: Reviewed.  Pain/nausea: 5-8/10 pain. Managed w/ PCA Dilaudid 0.1 mg q10 min. Ongoing nausea reported. Received IV benadryl+compazine x1  "

## 2023-01-02 ENCOUNTER — APPOINTMENT (OUTPATIENT)
Dept: OCCUPATIONAL THERAPY | Facility: CLINIC | Age: 58
End: 2023-01-02
Attending: SURGERY
Payer: COMMERCIAL

## 2023-01-02 LAB
GLUCOSE BLDC GLUCOMTR-MCNC: 112 MG/DL (ref 70–99)
GLUCOSE BLDC GLUCOMTR-MCNC: 119 MG/DL (ref 70–99)
GLUCOSE BLDC GLUCOMTR-MCNC: 125 MG/DL (ref 70–99)
GLUCOSE BLDC GLUCOMTR-MCNC: 131 MG/DL (ref 70–99)
HOLD SPECIMEN: NORMAL
MAGNESIUM SERPL-MCNC: 1.6 MG/DL (ref 1.7–2.3)
PHOSPHATE SERPL-MCNC: 3 MG/DL (ref 2.5–4.5)

## 2023-01-02 PROCEDURE — 250N000013 HC RX MED GY IP 250 OP 250 PS 637

## 2023-01-02 PROCEDURE — 84100 ASSAY OF PHOSPHORUS: CPT | Performed by: SURGERY

## 2023-01-02 PROCEDURE — 83735 ASSAY OF MAGNESIUM: CPT | Performed by: SURGERY

## 2023-01-02 PROCEDURE — 250N000011 HC RX IP 250 OP 636

## 2023-01-02 PROCEDURE — 36591 DRAW BLOOD OFF VENOUS DEVICE: CPT | Performed by: SURGERY

## 2023-01-02 PROCEDURE — 250N000011 HC RX IP 250 OP 636: Performed by: SURGERY

## 2023-01-02 PROCEDURE — 258N000003 HC RX IP 258 OP 636: Performed by: SURGERY

## 2023-01-02 PROCEDURE — 120N000002 HC R&B MED SURG/OB UMMC

## 2023-01-02 PROCEDURE — 250N000013 HC RX MED GY IP 250 OP 250 PS 637: Performed by: SURGERY

## 2023-01-02 PROCEDURE — 97535 SELF CARE MNGMENT TRAINING: CPT | Mod: GO

## 2023-01-02 RX ORDER — LIDOCAINE 4 G/G
1 PATCH TOPICAL
Status: DISCONTINUED | OUTPATIENT
Start: 2023-01-02 | End: 2023-01-02

## 2023-01-02 RX ORDER — CAFFEINE CITRATE 20 MG/ML
5 SOLUTION ORAL DAILY
Status: DISCONTINUED | OUTPATIENT
Start: 2023-01-02 | End: 2023-01-09 | Stop reason: HOSPADM

## 2023-01-02 RX ORDER — LIDOCAINE 4 G/G
1 PATCH TOPICAL
Status: DISCONTINUED | OUTPATIENT
Start: 2023-01-02 | End: 2023-01-09 | Stop reason: HOSPADM

## 2023-01-02 RX ORDER — MAGNESIUM SULFATE HEPTAHYDRATE 40 MG/ML
2 INJECTION, SOLUTION INTRAVENOUS ONCE
Status: COMPLETED | OUTPATIENT
Start: 2023-01-02 | End: 2023-01-02

## 2023-01-02 RX ADMIN — ZOLMITRIPTAN 5 MG: 2.5 TABLET, ORALLY DISINTEGRATING ORAL at 09:15

## 2023-01-02 RX ADMIN — PROCHLORPERAZINE EDISYLATE 10 MG: 5 INJECTION INTRAMUSCULAR; INTRAVENOUS at 10:09

## 2023-01-02 RX ADMIN — PROCHLORPERAZINE EDISYLATE 10 MG: 5 INJECTION INTRAMUSCULAR; INTRAVENOUS at 02:29

## 2023-01-02 RX ADMIN — OMEPRAZOLE 40 MG: KIT at 21:38

## 2023-01-02 RX ADMIN — DIPHENHYDRAMINE HYDROCHLORIDE 25 MG: 50 INJECTION, SOLUTION INTRAMUSCULAR; INTRAVENOUS at 02:29

## 2023-01-02 RX ADMIN — LIDOCAINE PATCH 4% 1 PATCH: 40 PATCH TOPICAL at 14:47

## 2023-01-02 RX ADMIN — FAMOTIDINE 20 MG: 40 POWDER, FOR SUSPENSION ORAL at 09:14

## 2023-01-02 RX ADMIN — CAFFEINE CITRATE 320 MG: 20 SOLUTION ORAL at 09:58

## 2023-01-02 RX ADMIN — DIPHENHYDRAMINE HYDROCHLORIDE 25 MG: 50 INJECTION, SOLUTION INTRAMUSCULAR; INTRAVENOUS at 17:16

## 2023-01-02 RX ADMIN — ACETAMINOPHEN 650 MG: 325 SUSPENSION ORAL at 17:23

## 2023-01-02 RX ADMIN — SODIUM CHLORIDE, POTASSIUM CHLORIDE, SODIUM LACTATE AND CALCIUM CHLORIDE: 600; 310; 30; 20 INJECTION, SOLUTION INTRAVENOUS at 09:10

## 2023-01-02 RX ADMIN — DIPHENHYDRAMINE HYDROCHLORIDE 25 MG: 50 INJECTION, SOLUTION INTRAMUSCULAR; INTRAVENOUS at 10:50

## 2023-01-02 RX ADMIN — LEVOTHYROXINE SODIUM 137 MCG: 100 SOLUTION ORAL at 09:14

## 2023-01-02 RX ADMIN — MAGNESIUM SULFATE IN WATER 2 G: 40 INJECTION, SOLUTION INTRAVENOUS at 10:09

## 2023-01-02 RX ADMIN — ACETAMINOPHEN 650 MG: 325 SUSPENSION ORAL at 09:14

## 2023-01-02 RX ADMIN — PROCHLORPERAZINE EDISYLATE 10 MG: 5 INJECTION INTRAMUSCULAR; INTRAVENOUS at 17:16

## 2023-01-02 RX ADMIN — ACETAMINOPHEN 650 MG: 325 SUSPENSION ORAL at 02:23

## 2023-01-02 RX ADMIN — PROMETHAZINE HYDROCHLORIDE 25 MG: 6.25 SYRUP ORAL at 12:40

## 2023-01-02 RX ADMIN — ENOXAPARIN SODIUM 40 MG: 40 INJECTION SUBCUTANEOUS at 12:40

## 2023-01-02 RX ADMIN — ACETAMINOPHEN 650 MG: 325 SUSPENSION ORAL at 21:38

## 2023-01-02 ASSESSMENT — ACTIVITIES OF DAILY LIVING (ADL)
ADLS_ACUITY_SCORE: 31

## 2023-01-02 NOTE — PROGRESS NOTES
"Goal outcome evaluation:      Plan of Care Reviewed with: Patient  Overall Patient Progress: No change    VS /70 (BP Location: Right arm)   Pulse 92   Temp 97.3  F (36.3  C) (Oral)   Resp 18   Ht 1.702 m (5' 7\")   Wt 64.4 kg (141 lb 14.4 oz)   SpO2 95%   BMI 22.22 kg/m     2803-7511  Activity: Up in room SBA/Independent   Neuros: A&O x4. CMS intact   Cardiac: WDL. Denies cardiac chest pain.  Respiratory: WNL on RA   GI/: Voiding spontaneously. -Flatus. LBM 12/30 PTA   Diet: NPO ex. meds & ice chips. Continuous TF @ 20 mL/hr  Skin/Incisions: Midline abd incision covered w/ primapore. Dried drainage marked.   Lines/Drains: RUQ G tube to gravity. LUQ J tube (LEAKING slightly w/ med administration) to TF + meds  Labs: Reviewed.  Pain/nausea: Abdominal pain Managed w/ PCA Dilaudid 0.1 mg q10 min. Ongoing nausea reported. Received IV benadryl+compazine x1  "

## 2023-01-02 NOTE — PLAN OF CARE
"Goal Outcome Evaluation:      Plan of Care Reviewed With: patient    Overall Patient Progress: no changeOverall Patient Progress: no change         /70 (BP Location: Right arm)   Pulse 93   Temp 98.6  F (37  C) (Oral)   Resp 16   Ht 1.702 m (5' 7\")   Wt 64.4 kg (141 lb 14.4 oz)   SpO2 96%   BMI 22.22 kg/m      Status: Stable, VS WDL.   Pain/Nausea: Pain is managed with PCA Dilaudid pump and nausea is managed with regimens as ordered.   Mobility: Up with assistance. Ambulatory.   Diet: NPO, ice chips, TF is in used at 20 mL.   Labs: Reviewed.   LDAs: G/J, Skater drain, PCA Dilaudid, R chest Port.  Skin/incisions: Midline abdominal incision, JACKIE. Some rash on LLQ.   Neuro: Intact, A&O x4.   Respiratory: WDL, on RA, denies SOB.   Cardiac: WDL, denies cardiac chest pain.   GI/: Continent of B&B, d/t pass flatus and have BM since surgery; voiding spontaneously.   New Changes: None.   Plan: Continue to monitor.   "

## 2023-01-02 NOTE — PLAN OF CARE
"/70 (BP Location: Right arm)   Pulse 92   Temp 97.3  F (36.3  C) (Oral)   Resp 18   Ht 1.702 m (5' 7\")   Wt 64.2 kg (141 lb 9.6 oz)   SpO2 95%   BMI 22.18 kg/m     Reason for admission: POD 3 x lap, BAUDILIO, j tube placement  Neuro: AOx4  Pain: abdominal pain tolerable on PCA dilaudid 0.1/10  CMS: intact  Cardiac: WDL  Resp: O2>90 on RA  GI/: AUOP, -BM, -flatus, G tube RUQ to gravity, J tube LUQ cont enteral feeds, intermittent nausea treated with prns  Skin/Wound: mdl abd incision stapled JACKIE, adhesive rash RLQ  Access: Port, needle change and dressing change complete with blood return noted, infusing IVMF  Labs: , mag replaced  Activity: Up SBA  Nutrition: NPO, Cont TF at 20, FWF 30q4h  Changes this shift: port dressing change, TF line changed with new relizorb device  Plan:  Monitor for ROBF, encourage activity                        "

## 2023-01-02 NOTE — PROGRESS NOTES
Surgery Progress Note  01/02/2023       Subjective:  - POD#3 S/p Ex-Lap, BAUDILIO, SBR, jejunostomy tube placement,   - NAEON, stable vitals, afebrile, voiding, stable Hgb, ambulating, no BM or flatus yet  - J-tube leaks when giving medications, no leak with tube feed   - Skin rash stable  Objective:  Temp:  [97.3  F (36.3  C)-98.9  F (37.2  C)] 97.3  F (36.3  C)  Pulse:  [] 92  Resp:  [16-18] 18  BP: (114-130)/(66-77) 124/70  SpO2:  [94 %-97 %] 95 %    I/O last 3 completed shifts:  In: 881 [P.O.:60; I.V.:741]  Out: 2525 [Urine:2175; Emesis/NG output:350]      Gen: Awake, alert, NAD  Resp: NLB on RA  Abd: soft, nondistended, approprietly tender   Incision: c/d/I  Gastric tube (RUQ): In place, had 170/50 output   J-Tube (LUQ): In place  Ext: WWP, no edema     Labs:  Recent Labs   Lab 01/01/23  0827 12/31/22  1038   WBC 8.6 8.2   HGB 11.5* 12.3    307       Recent Labs   Lab 01/02/23  0628 01/02/23  0225 01/01/23  1957 01/01/23  1557 01/01/23  1234 01/01/23  0827 12/31/22  1202 12/31/22  1038   NA  --   --   --   --   --   --   --  137   POTASSIUM  --   --   --   --   --   --   --  4.1   CHLORIDE  --   --   --   --   --   --   --  101   CO2  --   --   --   --   --   --   --  26   BUN  --   --   --   --   --   --   --  5.8*   CR  --   --   --   --   --   --   --  0.57   GLC  --  119* 140* 128*   < >  --    < > 127*   KASSI  --   --   --   --   --   --   --  9.0   MAG 1.6*  --   --   --   --  1.5*  --   --    PHOS 3.0  --   --   --   --  2.7  --   --     < > = values in this interval not displayed.          Assessment/Plan:     57 year old female with h/o migraine, chronic pancreatitis S/p TPIAT,splenectomy, gastrojejunostomy, R&Y, c/b gastroparesis, malnutrition. Patient underwent Ex-Lap, BAUDILIO, SBR, and J - feeding tube placement.    12/31 This morning patient doing well, able to ambulate. In addition patient asked for her code status to be (No CPR- Pre-arrest intubation OK). Dr. Pepe (Chief resident) discussed  with pt her wishes and she demonstrated understanding for the new CODE status, and that was changed based on patient wishes.     01/01 Doing well, with some rash on the LLQ, will continue to monitor.  01/02 This morning patient doing well, her rash is stable, denies any burning sensation. The J-tube leak is mostly due to pushing mediations and flush fast through the tube.     Neuro:  Start Lidocaine patch               Cont caffeine citrate syrup through J- feeding tube                Cont home PTA [id number]       Pain: PCAd 0.2mg/ 10 min lock, S.Tylenol solution (Per J-tube)    CV: Vitals Q4hrs     PULM: I.S, ambulate as tolerated     GI/FEN:  -Cont tube feed to 20 ml/hr, do not advance rate more than once every 24 hours                - Cont PPI solution through J-Tube (LUQ)                - Cont home PTA promethazine 25mg syrup through J- tube.    IVF: Cont LR@90 ml     Endocrine: - Appreciate endocrine team recs for glucose level management                     - Continue Levothyroxine through J-Tube     Lines: Cont PICC line care per protocol     Drains:  Push medications through the J-tube slowly               Gastric tube in the RUQ for gravity (Nothing per this tube)                J- Tube in the LUQ for feeding and meds                  Wound: Open to air   Activity: OOB, ambulate as tolerated   DVT Ppx: Lovenox 40 mg Q24hrs       Seen, examined, and discussed with chief resident, who discussed with staff Dr. Bauer   - - - - - - - - - - - - - - - - - -  Solitario Garcia MD  Surgery Resident   Pager [phone]  Text page on call surgery resident via [facility] Paging/Directory - [facility] Surgery  /[facility]

## 2023-01-03 LAB
ANION GAP SERPL CALCULATED.3IONS-SCNC: 16 MMOL/L (ref 7–15)
BUN SERPL-MCNC: 6 MG/DL (ref 6–20)
CALCIUM SERPL-MCNC: 9.2 MG/DL (ref 8.6–10)
CHLORIDE SERPL-SCNC: 99 MMOL/L (ref 98–107)
CREAT SERPL-MCNC: 0.52 MG/DL (ref 0.51–0.95)
DEPRECATED HCO3 PLAS-SCNC: 25 MMOL/L (ref 22–29)
ERYTHROCYTE [DISTWIDTH] IN BLOOD BY AUTOMATED COUNT: 13.5 % (ref 10–15)
GFR SERPL CREATININE-BSD FRML MDRD: >90 ML/MIN/1.73M2
GLUCOSE BLDC GLUCOMTR-MCNC: 140 MG/DL (ref 70–99)
GLUCOSE BLDC GLUCOMTR-MCNC: 147 MG/DL (ref 70–99)
GLUCOSE SERPL-MCNC: 127 MG/DL (ref 70–99)
HCT VFR BLD AUTO: 40.3 % (ref 35–47)
HGB BLD-MCNC: 12.7 G/DL (ref 11.7–15.7)
MAGNESIUM SERPL-MCNC: 1.8 MG/DL (ref 1.7–2.3)
MCH RBC QN AUTO: 31.2 PG (ref 26.5–33)
MCHC RBC AUTO-ENTMCNC: 31.5 G/DL (ref 31.5–36.5)
MCV RBC AUTO: 99 FL (ref 78–100)
PATH REPORT.COMMENTS IMP SPEC: NORMAL
PATH REPORT.COMMENTS IMP SPEC: NORMAL
PATH REPORT.FINAL DX SPEC: NORMAL
PATH REPORT.GROSS SPEC: NORMAL
PATH REPORT.MICROSCOPIC SPEC OTHER STN: NORMAL
PATH REPORT.RELEVANT HX SPEC: NORMAL
PHOSPHATE SERPL-MCNC: 3.2 MG/DL (ref 2.5–4.5)
PHOTO IMAGE: NORMAL
PLATELET # BLD AUTO: 337 10E3/UL (ref 150–450)
POTASSIUM SERPL-SCNC: 4 MMOL/L (ref 3.4–5.3)
RBC # BLD AUTO: 4.07 10E6/UL (ref 3.8–5.2)
SODIUM SERPL-SCNC: 140 MMOL/L (ref 136–145)
WBC # BLD AUTO: 6.7 10E3/UL (ref 4–11)

## 2023-01-03 PROCEDURE — 83735 ASSAY OF MAGNESIUM: CPT | Performed by: SURGERY

## 2023-01-03 PROCEDURE — 250N000011 HC RX IP 250 OP 636

## 2023-01-03 PROCEDURE — 36591 DRAW BLOOD OFF VENOUS DEVICE: CPT

## 2023-01-03 PROCEDURE — 80048 BASIC METABOLIC PNL TOTAL CA: CPT

## 2023-01-03 PROCEDURE — 250N000013 HC RX MED GY IP 250 OP 250 PS 637

## 2023-01-03 PROCEDURE — 85027 COMPLETE CBC AUTOMATED: CPT

## 2023-01-03 PROCEDURE — 258N000003 HC RX IP 258 OP 636: Performed by: SURGERY

## 2023-01-03 PROCEDURE — 84100 ASSAY OF PHOSPHORUS: CPT

## 2023-01-03 PROCEDURE — 250N000011 HC RX IP 250 OP 636: Performed by: SURGERY

## 2023-01-03 PROCEDURE — 250N000013 HC RX MED GY IP 250 OP 250 PS 637: Performed by: SURGERY

## 2023-01-03 PROCEDURE — 120N000002 HC R&B MED SURG/OB UMMC

## 2023-01-03 RX ORDER — ZOLMITRIPTAN 2.5 MG/1
5 TABLET, ORALLY DISINTEGRATING ORAL DAILY
Status: DISCONTINUED | OUTPATIENT
Start: 2023-01-03 | End: 2023-01-06

## 2023-01-03 RX ADMIN — DIPHENHYDRAMINE HYDROCHLORIDE 25 MG: 50 INJECTION, SOLUTION INTRAMUSCULAR; INTRAVENOUS at 07:41

## 2023-01-03 RX ADMIN — ACETAMINOPHEN 650 MG: 325 SUSPENSION ORAL at 04:40

## 2023-01-03 RX ADMIN — FAMOTIDINE 20 MG: 40 POWDER, FOR SUSPENSION ORAL at 08:20

## 2023-01-03 RX ADMIN — OMEPRAZOLE 40 MG: KIT at 21:22

## 2023-01-03 RX ADMIN — DIPHENHYDRAMINE HYDROCHLORIDE 25 MG: 50 INJECTION, SOLUTION INTRAMUSCULAR; INTRAVENOUS at 01:29

## 2023-01-03 RX ADMIN — PROCHLORPERAZINE EDISYLATE 10 MG: 5 INJECTION INTRAMUSCULAR; INTRAVENOUS at 07:45

## 2023-01-03 RX ADMIN — ACETAMINOPHEN 650 MG: 325 SUSPENSION ORAL at 17:00

## 2023-01-03 RX ADMIN — ACETAMINOPHEN 650 MG: 325 SUSPENSION ORAL at 10:10

## 2023-01-03 RX ADMIN — ENOXAPARIN SODIUM 40 MG: 40 INJECTION SUBCUTANEOUS at 13:40

## 2023-01-03 RX ADMIN — PROCHLORPERAZINE EDISYLATE 10 MG: 5 INJECTION INTRAMUSCULAR; INTRAVENOUS at 13:11

## 2023-01-03 RX ADMIN — CAFFEINE CITRATE 320 MG: 20 SOLUTION ORAL at 08:20

## 2023-01-03 RX ADMIN — PROCHLORPERAZINE EDISYLATE 10 MG: 5 INJECTION INTRAMUSCULAR; INTRAVENOUS at 21:22

## 2023-01-03 RX ADMIN — LIDOCAINE PATCH 4% 1 PATCH: 40 PATCH TOPICAL at 21:23

## 2023-01-03 RX ADMIN — DIPHENHYDRAMINE HYDROCHLORIDE 25 MG: 50 INJECTION, SOLUTION INTRAMUSCULAR; INTRAVENOUS at 14:35

## 2023-01-03 RX ADMIN — LEVOTHYROXINE SODIUM 137 MCG: 100 SOLUTION ORAL at 08:20

## 2023-01-03 RX ADMIN — SODIUM CHLORIDE, POTASSIUM CHLORIDE, SODIUM LACTATE AND CALCIUM CHLORIDE: 600; 310; 30; 20 INJECTION, SOLUTION INTRAVENOUS at 05:46

## 2023-01-03 RX ADMIN — PROCHLORPERAZINE EDISYLATE 10 MG: 5 INJECTION INTRAMUSCULAR; INTRAVENOUS at 01:28

## 2023-01-03 RX ADMIN — ACETAMINOPHEN 650 MG: 325 SUSPENSION ORAL at 21:24

## 2023-01-03 ASSESSMENT — ACTIVITIES OF DAILY LIVING (ADL)
ADLS_ACUITY_SCORE: 31

## 2023-01-03 NOTE — PLAN OF CARE
B/P: 122/73, T: 97.6, P: 92, R: 17 . C/O nausea, compazine and benadryl given. Scheduled tylenol given did c/o pain, but tolerable with PCA. G tube to gravity. JT with TF and used for meds. Up to bathroom SBA. Appeared to sleep in between cares. Continue to monitor and notify MD with any significant changes.

## 2023-01-03 NOTE — PROGRESS NOTES
Surgery Progress Note  01/03/2023       Subjective:  - S/p Ex-Lap, BAUDILIO, SBR, jejunostomy tube placement on 12/30  - NAEON, stable vitals, afebrile  - Some pain overnight but thinks it is because she doesn't push the PCA   - voiding, no BM or flatus yet  - Ambulating several times a day  - Skin rash stable    Objective:  Temp:  [96.6  F (35.9  C)-98.1  F (36.7  C)] 98.1  F (36.7  C)  Pulse:  [85-96] 85  Resp:  [16-18] 16  BP: (115-130)/(73-77) 115/77  SpO2:  [94 %-96 %] 96 %    I/O last 3 completed shifts:  In: 1380 [I.V.:560; NG/GT:180]  Out: 2790 [Urine:2650; Emesis/NG output:140]      Gen: Awake, alert, NAD  Resp: NLB on RA  Abd: soft, nondistended, approprietly tender   Incision: c/d/I  Gastric tube (RUQ): gastric output, 440ml   J-Tube (LUQ): In place  Ext: WWP, no edema     Labs:  Recent Labs   Lab 01/01/23  0827 12/31/22  1038   WBC 8.6 8.2   HGB 11.5* 12.3    307       Recent Labs   Lab 01/02/23  2259 01/02/23  1713 01/02/23  1408 01/02/23  0628 01/01/23  1234 01/01/23  0827 12/31/22  1202 12/31/22  1038   NA  --   --   --   --   --   --   --  137   POTASSIUM  --   --   --   --   --   --   --  4.1   CHLORIDE  --   --   --   --   --   --   --  101   CO2  --   --   --   --   --   --   --  26   BUN  --   --   --   --   --   --   --  5.8*   CR  --   --   --   --   --   --   --  0.57   * 125* 112*  --    < >  --    < > 127*   KASSI  --   --   --   --   --   --   --  9.0   MAG  --   --   --  1.6*  --  1.5*  --   --    PHOS  --   --   --  3.0  --  2.7  --   --     < > = values in this interval not displayed.          Assessment/Plan:     57 year old female with h/o migraine, chronic pancreatitis S/p TPIAT,splenectomy, gastrojejunostomy, R&Y, c/b gastroparesis, malnutrition. Patient underwent Ex-Lap, BAUDILIO, SBR, and J - feeding tube placement on 12/30. Progressing well post operatively. Had some leakage from J tube with fast medication pushes, improved when administered slower.     Neuro:  fletcher tylenol per  J, dilaudid PCA 0.1mg/q10 min.  Lidocaine patch               PTA caffeine citrate syrup through J- feeding tube                PTA Zomig-ZMT     CV: Vitals Q4hrs     PULM: IS, ambulate QID    GI/FEN:  - Cont TFs @ 20 ml/hr, keep at this rate today while awaiting ROBF.                - Cont PPI solution through J-Tube (LUQ)                - Cont home PTA promethazine 25mg syrup through J- tube.      - Scopolomine patch, prn compazine     IVF: LR @ 30ml, will wean when closer to TF goal    Endocrine: - Appreciate endocrine team recs for glucose level management                     - Continue Levothyroxine through J-Tube     Lines: Cont PICC line care per protocol     Drains:  Push medications through the J-tube slowly                Gastric tube in the RUQ for gravity (Nothing per this tube)                 J- Tube in the LUQ for feeding and meds                  Wound: Open to air. Staples in place.   Activity: OOB, ambulate as tolerated   DVT Ppx: Lovenox 40 mg Q24hrs       Seen, examined, and discussed with chief resident, who discussed with staff Dr. Bauer     - - - - - - - - - - - - - - - - - -  Moriah Miller MD  [facility] General Surgery PGY-2  01/03/2023    See [facility] for on-call pager information: [facility] Paging/Directory - General Surgery [facility]

## 2023-01-03 NOTE — PLAN OF CARE
"Goal Outcome Evaluation:      Plan of Care Reviewed With: patient  /77 (BP Location: Right arm)   Pulse 85   Temp 98.1  F (36.7  C) (Oral)   Resp 16   Ht 1.702 m (5' 7\")   Wt 64.2 kg (141 lb 9.6 oz)   SpO2 96%   BMI 22.18 kg/m       Patient reports pain is tolerable with PCA Dilaudid and scheduled Tylenol. Abdomen soft with hypoactive bowel sounds present, patient reports she is passing gas but no BM this shift. J-tube leaking at site, MD aware. Tube feeding have been stopped, waiting on new orders. G-tube intact and hooked to gravity bag with moderate amount of yellow/bile output.  Voiding and not saving. Up independently. LR infusing through right chest port. Continue with POC.                  "

## 2023-01-04 ENCOUNTER — MYC MEDICAL ADVICE (OUTPATIENT)
Dept: SURGERY | Facility: CLINIC | Age: 58
End: 2023-01-04

## 2023-01-04 LAB
ANION GAP SERPL CALCULATED.3IONS-SCNC: 13 MMOL/L (ref 7–15)
BUN SERPL-MCNC: 7.9 MG/DL (ref 6–20)
CALCIUM SERPL-MCNC: 8.8 MG/DL (ref 8.6–10)
CHLORIDE SERPL-SCNC: 100 MMOL/L (ref 98–107)
CREAT SERPL-MCNC: 0.53 MG/DL (ref 0.51–0.95)
DEPRECATED HCO3 PLAS-SCNC: 25 MMOL/L (ref 22–29)
ERYTHROCYTE [DISTWIDTH] IN BLOOD BY AUTOMATED COUNT: 13.5 % (ref 10–15)
GFR SERPL CREATININE-BSD FRML MDRD: >90 ML/MIN/1.73M2
GLUCOSE BLDC GLUCOMTR-MCNC: 117 MG/DL (ref 70–99)
GLUCOSE BLDC GLUCOMTR-MCNC: 120 MG/DL (ref 70–99)
GLUCOSE BLDC GLUCOMTR-MCNC: 122 MG/DL (ref 70–99)
GLUCOSE SERPL-MCNC: 127 MG/DL (ref 70–99)
HCT VFR BLD AUTO: 38.5 % (ref 35–47)
HGB BLD-MCNC: 12.5 G/DL (ref 11.7–15.7)
MAGNESIUM SERPL-MCNC: 1.8 MG/DL (ref 1.7–2.3)
MCH RBC QN AUTO: 31.6 PG (ref 26.5–33)
MCHC RBC AUTO-ENTMCNC: 32.5 G/DL (ref 31.5–36.5)
MCV RBC AUTO: 98 FL (ref 78–100)
PHOSPHATE SERPL-MCNC: 3.9 MG/DL (ref 2.5–4.5)
PLATELET # BLD AUTO: 326 10E3/UL (ref 150–450)
POTASSIUM SERPL-SCNC: 4.1 MMOL/L (ref 3.4–5.3)
RBC # BLD AUTO: 3.95 10E6/UL (ref 3.8–5.2)
SODIUM SERPL-SCNC: 138 MMOL/L (ref 136–145)
WBC # BLD AUTO: 6.8 10E3/UL (ref 4–11)

## 2023-01-04 PROCEDURE — 83735 ASSAY OF MAGNESIUM: CPT

## 2023-01-04 PROCEDURE — 250N000013 HC RX MED GY IP 250 OP 250 PS 637: Performed by: SURGERY

## 2023-01-04 PROCEDURE — 85018 HEMOGLOBIN: CPT

## 2023-01-04 PROCEDURE — 84100 ASSAY OF PHOSPHORUS: CPT

## 2023-01-04 PROCEDURE — 250N000011 HC RX IP 250 OP 636

## 2023-01-04 PROCEDURE — 250N000013 HC RX MED GY IP 250 OP 250 PS 637

## 2023-01-04 PROCEDURE — 120N000002 HC R&B MED SURG/OB UMMC

## 2023-01-04 PROCEDURE — 80048 BASIC METABOLIC PNL TOTAL CA: CPT

## 2023-01-04 PROCEDURE — 250N000011 HC RX IP 250 OP 636: Performed by: SURGERY

## 2023-01-04 PROCEDURE — 250N000009 HC RX 250

## 2023-01-04 PROCEDURE — 36591 DRAW BLOOD OFF VENOUS DEVICE: CPT

## 2023-01-04 RX ORDER — SCOLOPAMINE TRANSDERMAL SYSTEM 1 MG/1
1 PATCH, EXTENDED RELEASE TRANSDERMAL
Status: DISCONTINUED | OUTPATIENT
Start: 2023-01-04 | End: 2023-01-09 | Stop reason: HOSPADM

## 2023-01-04 RX ORDER — OXYCODONE HCL 5 MG/5 ML
5-10 SOLUTION, ORAL ORAL EVERY 4 HOURS PRN
Status: DISCONTINUED | OUTPATIENT
Start: 2023-01-04 | End: 2023-01-09 | Stop reason: HOSPADM

## 2023-01-04 RX ORDER — ACETAMINOPHEN 325 MG/10.15ML
650 LIQUID ORAL EVERY 6 HOURS PRN
Status: DISCONTINUED | OUTPATIENT
Start: 2023-01-04 | End: 2023-01-09 | Stop reason: HOSPADM

## 2023-01-04 RX ORDER — HYDROMORPHONE HCL IN WATER/PF 6 MG/30 ML
.2-.4 PATIENT CONTROLLED ANALGESIA SYRINGE INTRAVENOUS
Status: DISCONTINUED | OUTPATIENT
Start: 2023-01-04 | End: 2023-01-06

## 2023-01-04 RX ORDER — HYDROXYZINE HCL 10 MG/5 ML
25 SOLUTION, ORAL ORAL EVERY 4 HOURS PRN
Status: DISCONTINUED | OUTPATIENT
Start: 2023-01-04 | End: 2023-01-09 | Stop reason: HOSPADM

## 2023-01-04 RX ORDER — BISACODYL 10 MG
10 SUPPOSITORY, RECTAL RECTAL 2 TIMES DAILY
Status: COMPLETED | OUTPATIENT
Start: 2023-01-04 | End: 2023-01-04

## 2023-01-04 RX ORDER — MAGNESIUM SULFATE HEPTAHYDRATE 40 MG/ML
2 INJECTION, SOLUTION INTRAVENOUS ONCE
Status: COMPLETED | OUTPATIENT
Start: 2023-01-04 | End: 2023-01-04

## 2023-01-04 RX ADMIN — HYDROMORPHONE HYDROCHLORIDE 0.4 MG: 0.2 INJECTION, SOLUTION INTRAMUSCULAR; INTRAVENOUS; SUBCUTANEOUS at 15:59

## 2023-01-04 RX ADMIN — DIPHENHYDRAMINE HYDROCHLORIDE 25 MG: 50 INJECTION, SOLUTION INTRAMUSCULAR; INTRAVENOUS at 16:14

## 2023-01-04 RX ADMIN — PROCHLORPERAZINE EDISYLATE 10 MG: 5 INJECTION INTRAMUSCULAR; INTRAVENOUS at 22:21

## 2023-01-04 RX ADMIN — CAFFEINE CITRATE 320 MG: 20 SOLUTION ORAL at 08:40

## 2023-01-04 RX ADMIN — LEVOTHYROXINE SODIUM 137 MCG: 100 SOLUTION ORAL at 08:40

## 2023-01-04 RX ADMIN — SCOPALAMINE 1 PATCH: 1 PATCH, EXTENDED RELEASE TRANSDERMAL at 12:11

## 2023-01-04 RX ADMIN — FAMOTIDINE 20 MG: 40 POWDER, FOR SUSPENSION ORAL at 08:40

## 2023-01-04 RX ADMIN — PROCHLORPERAZINE EDISYLATE 10 MG: 5 INJECTION INTRAMUSCULAR; INTRAVENOUS at 10:56

## 2023-01-04 RX ADMIN — OXYCODONE HYDROCHLORIDE 10 MG: 5 SOLUTION ORAL at 13:53

## 2023-01-04 RX ADMIN — Medication 10 MG: at 10:21

## 2023-01-04 RX ADMIN — OXYCODONE HYDROCHLORIDE 10 MG: 5 SOLUTION ORAL at 08:55

## 2023-01-04 RX ADMIN — ACETAMINOPHEN 650 MG: 325 SUSPENSION ORAL at 04:41

## 2023-01-04 RX ADMIN — OXYCODONE HYDROCHLORIDE 10 MG: 5 SOLUTION ORAL at 22:21

## 2023-01-04 RX ADMIN — OMEPRAZOLE 40 MG: KIT at 21:38

## 2023-01-04 RX ADMIN — ENOXAPARIN SODIUM 40 MG: 40 INJECTION SUBCUTANEOUS at 12:12

## 2023-01-04 RX ADMIN — LIDOCAINE PATCH 4% 1 PATCH: 40 PATCH TOPICAL at 21:37

## 2023-01-04 RX ADMIN — ACETAMINOPHEN 650 MG: 325 SUSPENSION ORAL at 10:17

## 2023-01-04 RX ADMIN — HYDROMORPHONE HYDROCHLORIDE 0.2 MG: 0.2 INJECTION, SOLUTION INTRAMUSCULAR; INTRAVENOUS; SUBCUTANEOUS at 12:18

## 2023-01-04 RX ADMIN — MAGNESIUM SULFATE IN WATER 2 G: 40 INJECTION, SOLUTION INTRAVENOUS at 11:28

## 2023-01-04 RX ADMIN — PROCHLORPERAZINE EDISYLATE 10 MG: 5 INJECTION INTRAMUSCULAR; INTRAVENOUS at 04:41

## 2023-01-04 RX ADMIN — OXYCODONE HYDROCHLORIDE 5 MG: 5 SOLUTION ORAL at 18:33

## 2023-01-04 ASSESSMENT — ACTIVITIES OF DAILY LIVING (ADL)
ADLS_ACUITY_SCORE: 31
ADLS_ACUITY_SCORE: 31
DEPENDENT_IADLS:: TRANSPORTATION;CLEANING
ADLS_ACUITY_SCORE: 30
ADLS_ACUITY_SCORE: 31
ADLS_ACUITY_SCORE: 31
ADLS_ACUITY_SCORE: 30
ADLS_ACUITY_SCORE: 31
ADLS_ACUITY_SCORE: 30
ADLS_ACUITY_SCORE: 30

## 2023-01-04 NOTE — CONSULTS
Care Management Initial Consult    General Information  Assessment completed with: Patient,    Type of CM/SW Visit: Initial Assessment    Primary Care Provider verified and updated as needed: Yes   Readmission within the last 30 days: unable to assess   Return Category: Post-op/Post-procedure complication  Reason for Consult: discharge planning  Advance Care Planning: Advance Care Planning Reviewed: no concerns identified  ACP document scanned in chart       Communication Assessment  Patient's communication style: spoken language (English or Bilingual)    Hearing Difficulty or Deaf: no   Wear Glasses or Blind: yes    Cognitive  Cognitive/Neuro/Behavioral: WDL  Level of Consciousness: alert  Arousal Level: opens eyes spontaneously  Orientation: oriented x 4  Mood/Behavior: calm, cooperative  Best Language: 0 - No aphasia  Speech: clear, spontaneous    Living Environment:   People in home: spouse     Current living Arrangements: house      Able to return to prior arrangements: yes       Family/Social Support:  Care provided by: spouse/significant other, self, homecare agency  Provides care for:    Marital Status:   , Children, Other (specify) (Friends, 2 adult children in [facility] area)          Description of Support System: Supportive, Involved    Support Assessment: Adequate family and caregiver support, Adequate social supports    Current Resources:   Patient receiving home care services: Yes  Skilled Home Care Services: Skilled Nursing  Community Resources: Home Infusion, Home Care  Equipment currently used at home: other (see comments) (IV pole, has an adjustable bed to raise HOB)  Supplies currently used at home: Enteral Nutrition & Supplies    Employment/Financial:  Employment Status: self-employed        Financial Concerns:   None identified          Lifestyle & Psychosocial Needs:  Social Determinants of Health     Tobacco Use: Medium Risk     Smoking Tobacco Use: Former     Smokeless Tobacco Use:  Never     Passive Exposure: Not on file   Alcohol Use: Not on file   Financial Resource Strain: Not on file   Food Insecurity: Not on file   Transportation Needs: Not on file   Physical Activity: Not on file   Stress: Not on file   Social Connections: Not on file   Intimate Partner Violence: Not on file   Depression: Not at risk     PHQ-2 Score: 2   Housing Stability: Not on file       Functional Status:  Prior to admission patient needed assistance:   Dependent ADLs:: Independent  Dependent IADLs:: Transportation, Cleaning  Assesssment of Functional Status: Not at baseline with ADL Functioning, Not at baseline with mobility, Not at  functional baseline    Mental Health Status:  Mental Health Status: Current Concern  Mental Health Management: Individual Therapy    Values/Beliefs:  Spiritual, Cultural Beliefs, Holiness Practices, Values that affect care: no               Additional Information:  Patient is a 57 year old female admitted for placement of J tube, with severe malnutrition and dehydration, gastroparesis. Patient reports her feeding tubes keep falling out and she cannot get adequate nutrition. Due to these continued complications she is on LTD. 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 Momentarily tearful with change in functional ability and continued decline of health. Reports seeing an therapist outpatient. On service with [facility] for tube feeding, LR 1-2 bags and phenergan. Receives fluids and phenergan every other day. Has a port and receives skilled nursing services to infuse fluids every other day through [facility]. HC nurse accesses her port once per week and patient de accesses at the end of the week. [facility] also supplying replacement supplies. May need OT services, will continue to monitor.     [facility]  Address: [facility] [address] [place] [address]  Phone: [phone]  Appointments: [facility]      Mariangel Mc RN, BSN  Float Care  Coordinator  [address] [facility] [address] [place], [place]  [email or web address]

## 2023-01-04 NOTE — PLAN OF CARE
"Vital signs:  Temp: 97.8  F (36.6  C) Temp src: Oral BP: 135/75 Pulse: 92   Resp: 16 SpO2: 96 % O2 Device: None (Room air)  Height: 170.2 cm (5' 7\") Weight: 64.2 kg (141 lb 9.6 oz)    9157-8319:    Activity: Up independently.  Neuros: A & Ox4. Drowsy.   Cardiac: WDL. Asymptomatic.   Respiratory: LS clear. O2 sats high 90s on RA. Denies SOB. Unlabored.   GI/: BS+, passing flatus, no BM. Voiding, not saving.   Diet: NPO, ice chips.  Skin: Redness around J-tube, tan thick drainage at site, cleansed area x2, applied new dressing x2.  Lines: LR infusing at 30 mL/hr via right chest portacath.   Incisions/Drains: Midline incision dressing c/d/I. TF running via J-tube at 20 mL/hr. G-tube to gravity with 350cc green output.   Labs: Reviewed.  Pain/nausea: J-tube site pain controlled with PCA Dilaudid at 0.1mg with 10 minute lockout and scheduled Tylenol, patient slept in between cares. PRN Compazine x1 effective for nausea.  New changes this shift: None.   Plan: Continue POC.     "

## 2023-01-04 NOTE — CARE PLAN
Time: 8303-3221  Activity: up ad koffi   Pain: LUQ, RUQ and lower back pain. Managed with PCA dilaudid and Tylenol.  Neuro: A/O x 4, Denies N/T. Makes needs known and calls appropriately.   Cardiac: Denies Cardiac chest pain.   Respiratory: Denies SOB, on RA.   GI/: -BM, +BS and passing gas. Voiding Spontaneously. AUOP.  Diet: NPO, on tube feed running @ 20ml/hr and w/30 ml free water q4.  Lines: Right Chest port. Running PCA and LR 50 ml/hr   Incisions/Drains/Skin: midline incision, G-tube and  J-tube. J-tube incision site leaking a small amount of feeding formula. Cleaned and gauze applied.   Lab: Reviewed   New changes this shift: No significant changes this shift, Continue POC

## 2023-01-04 NOTE — PLAN OF CARE
"Goal Outcome Evaluation:      Plan of Care Reviewed With: patient  /73 (BP Location: Right arm)   Pulse 95   Temp 98.5  F (36.9  C) (Oral)   Resp 16   Ht 1.702 m (5' 7\")   Wt 63.8 kg (140 lb 11.2 oz)   SpO2 95%   BMI 22.04 kg/m       Patient reports pain is tolerable with Oxycodone every 4 hours and Dilaudid x 1. G tube to gravity with moderate amounts of bile output. J-tube intact with redness and leaking around site.  Tube feeds infusing at 10 ml/hr. Patient voiding and not saving. Abdomen soft with bowel sounds present, patient reports she is passing gas and had one BM this shift.  Mag of 1.8 replaced with 2 g.  Continue with POC.                  "

## 2023-01-04 NOTE — PROGRESS NOTES
Surgery Progress Note  01/04/2023       Subjective:  Passing flatus but feels more bloated today. Ambulated several times yesterday. Pain around the J tube when tube is manipulated. Tube feeds occasionally backing up. Pain overnight when not using PCA.    Objective:  Temp:  [96.7  F (35.9  C)-97.8  F (36.6  C)] 97.8  F (36.6  C)  Pulse:  [90-92] 92  Resp:  [16] 16  BP: (119-135)/(63-75) 135/75  SpO2:  [96 %-100 %] 96 %    I/O last 3 completed shifts:  In: 712 [I.V.:282; NG/GT:150]  Out: 500 [Emesis/NG output:500]      Gen: Awake, alert, NAD  Resp: NLB on RA  Abd: soft, mildly distended, tender and erythematous around j-tube site  Incision: c/d/I  Gastric tube (RUQ): thin, yellow green output  Ext: WWP, no edema     Labs:  Recent Labs   Lab 01/03/23  1013 01/01/23  0827 12/31/22  1038   WBC 6.7 8.6 8.2   HGB 12.7 11.5* 12.3    273 307       Recent Labs   Lab 01/04/23  0325 01/03/23  2234 01/03/23  1751 01/03/23  1013 01/02/23  1408 01/02/23  0628 01/01/23  1234 01/01/23  0827 12/31/22  1202 12/31/22  1038   NA  --   --   --  140  --   --   --   --   --  137   POTASSIUM  --   --   --  4.0  --   --   --   --   --  4.1   CHLORIDE  --   --   --  99  --   --   --   --   --  101   CO2  --   --   --  25  --   --   --   --   --  26   BUN  --   --   --  6.0  --   --   --   --   --  5.8*   CR  --   --   --  0.52  --   --   --   --   --  0.57   * 147* 140* 127*   < >  --    < >  --    < > 127*   KASSI  --   --   --  9.2  --   --   --   --   --  9.0   MAG  --   --   --  1.8  --  1.6*  --  1.5*  --   --    PHOS  --   --   --  3.2  --  3.0  --  2.7  --   --     < > = values in this interval not displayed.        Assessment/Plan:     57 year old female with h/o migraine, chronic pancreatitis S/p TPIAT,splenectomy, gastrojejunostomy, R&Y, c/b gastroparesis, malnutrition. Patient underwent Ex-Lap, BAUDILIO, SBR, and J - feeding tube placement on 12/30. Awaiting ROBF and feed advancement.  Neuro:  fletcher tylenol per J, discontinue  PCA. Enteral oxy for longer coverage, IV dilaudid for breakthrough.  Lidocaine patch               PTA caffeine citrate syrup through J- feeding tube                PTA Zomig-ZMT   PULM: IS, ambulate QID  GI/FEN:  - TFs @ 10 ml/hr, keep at this rate today while awaiting ROBF                - Cont PPI solution through J-ube (LUQ)T                - Cont home PTA promethazine 25mg syrup through J- tube.      - Scopolomine patch, prn compazine  IVF: LR @ 30ml, will wean when closer to TF goal  Endocrine: - Continue Levothyroxine through J-Tube   Lines: Cont PICC line care per protocol   Drains:  Push medications through the J-tube slowly                Gastric tube in the RUQ for gravity (Nothing per this tube)                J- Tube in the LUQ for feeding and meds                Wound: Open to air. Staples in place.   Activity: OOB, ambulate as tolerated   DVT Ppx: Lovenox 40 mg Q24hrs       Discussed with staff Dr. Bauer     - - - - - - - - - - - - - - - - - -  Martha Rubi MD, MS  General Surgery, PGY-7  Pg [phone]    See [facility] for on-call pager information: [facility] Paging/Directory - General Surgery [facility]

## 2023-01-05 ENCOUNTER — APPOINTMENT (OUTPATIENT)
Dept: OCCUPATIONAL THERAPY | Facility: CLINIC | Age: 58
End: 2023-01-05
Attending: SURGERY
Payer: COMMERCIAL

## 2023-01-05 LAB
ANION GAP SERPL CALCULATED.3IONS-SCNC: 12 MMOL/L (ref 7–15)
BUN SERPL-MCNC: 8.2 MG/DL (ref 6–20)
CALCIUM SERPL-MCNC: 9.4 MG/DL (ref 8.6–10)
CHLORIDE SERPL-SCNC: 100 MMOL/L (ref 98–107)
CREAT SERPL-MCNC: 0.57 MG/DL (ref 0.51–0.95)
DEPRECATED HCO3 PLAS-SCNC: 26 MMOL/L (ref 22–29)
GFR SERPL CREATININE-BSD FRML MDRD: >90 ML/MIN/1.73M2
GLUCOSE BLDC GLUCOMTR-MCNC: 128 MG/DL (ref 70–99)
GLUCOSE BLDC GLUCOMTR-MCNC: 143 MG/DL (ref 70–99)
GLUCOSE SERPL-MCNC: 124 MG/DL (ref 70–99)
MAGNESIUM SERPL-MCNC: 1.9 MG/DL (ref 1.7–2.3)
PHOSPHATE SERPL-MCNC: 4.3 MG/DL (ref 2.5–4.5)
POTASSIUM SERPL-SCNC: 4.2 MMOL/L (ref 3.4–5.3)
SODIUM SERPL-SCNC: 138 MMOL/L (ref 136–145)

## 2023-01-05 PROCEDURE — 97530 THERAPEUTIC ACTIVITIES: CPT | Mod: GO

## 2023-01-05 PROCEDURE — 120N000002 HC R&B MED SURG/OB UMMC

## 2023-01-05 PROCEDURE — 250N000011 HC RX IP 250 OP 636

## 2023-01-05 PROCEDURE — 250N000011 HC RX IP 250 OP 636: Performed by: SURGERY

## 2023-01-05 PROCEDURE — 36592 COLLECT BLOOD FROM PICC: CPT

## 2023-01-05 PROCEDURE — 84100 ASSAY OF PHOSPHORUS: CPT

## 2023-01-05 PROCEDURE — 250N000013 HC RX MED GY IP 250 OP 250 PS 637: Performed by: SURGERY

## 2023-01-05 PROCEDURE — 250N000013 HC RX MED GY IP 250 OP 250 PS 637

## 2023-01-05 PROCEDURE — 80048 BASIC METABOLIC PNL TOTAL CA: CPT

## 2023-01-05 PROCEDURE — 83735 ASSAY OF MAGNESIUM: CPT

## 2023-01-05 RX ORDER — METHOCARBAMOL 100 MG/ML
500 INJECTION, SOLUTION INTRAMUSCULAR; INTRAVENOUS EVERY 6 HOURS PRN
Status: DISCONTINUED | OUTPATIENT
Start: 2023-01-05 | End: 2023-01-09 | Stop reason: HOSPADM

## 2023-01-05 RX ORDER — MAGNESIUM SULFATE HEPTAHYDRATE 40 MG/ML
2 INJECTION, SOLUTION INTRAVENOUS ONCE
Status: COMPLETED | OUTPATIENT
Start: 2023-01-05 | End: 2023-01-05

## 2023-01-05 RX ADMIN — OXYCODONE HYDROCHLORIDE 10 MG: 5 SOLUTION ORAL at 12:59

## 2023-01-05 RX ADMIN — OXYCODONE HYDROCHLORIDE 10 MG: 5 SOLUTION ORAL at 18:53

## 2023-01-05 RX ADMIN — OMEPRAZOLE 40 MG: KIT at 20:23

## 2023-01-05 RX ADMIN — METHOCARBAMOL 500 MG: 100 INJECTION, SOLUTION INTRAMUSCULAR; INTRAVENOUS at 13:00

## 2023-01-05 RX ADMIN — OXYCODONE HYDROCHLORIDE 5 MG: 5 SOLUTION ORAL at 23:04

## 2023-01-05 RX ADMIN — OXYCODONE HYDROCHLORIDE 5 MG: 5 SOLUTION ORAL at 08:39

## 2023-01-05 RX ADMIN — HYDROMORPHONE HYDROCHLORIDE 0.2 MG: 0.2 INJECTION, SOLUTION INTRAMUSCULAR; INTRAVENOUS; SUBCUTANEOUS at 05:20

## 2023-01-05 RX ADMIN — PROCHLORPERAZINE EDISYLATE 10 MG: 5 INJECTION INTRAMUSCULAR; INTRAVENOUS at 12:59

## 2023-01-05 RX ADMIN — DIPHENHYDRAMINE HYDROCHLORIDE 25 MG: 50 INJECTION, SOLUTION INTRAMUSCULAR; INTRAVENOUS at 10:11

## 2023-01-05 RX ADMIN — MAGNESIUM SULFATE IN WATER 2 G: 40 INJECTION, SOLUTION INTRAVENOUS at 20:22

## 2023-01-05 RX ADMIN — ENOXAPARIN SODIUM 40 MG: 40 INJECTION SUBCUTANEOUS at 13:00

## 2023-01-05 RX ADMIN — CAFFEINE CITRATE 320 MG: 20 SOLUTION ORAL at 08:52

## 2023-01-05 RX ADMIN — PROCHLORPERAZINE EDISYLATE 10 MG: 5 INJECTION INTRAMUSCULAR; INTRAVENOUS at 05:28

## 2023-01-05 RX ADMIN — HYDROMORPHONE HYDROCHLORIDE 0.4 MG: 0.2 INJECTION, SOLUTION INTRAMUSCULAR; INTRAVENOUS; SUBCUTANEOUS at 15:57

## 2023-01-05 RX ADMIN — LEVOTHYROXINE SODIUM 137 MCG: 100 SOLUTION ORAL at 08:52

## 2023-01-05 RX ADMIN — OXYCODONE HYDROCHLORIDE 5 MG: 5 SOLUTION ORAL at 02:25

## 2023-01-05 RX ADMIN — METHOCARBAMOL 500 MG: 100 INJECTION, SOLUTION INTRAMUSCULAR; INTRAVENOUS at 23:04

## 2023-01-05 RX ADMIN — LIDOCAINE PATCH 4% 1 PATCH: 40 PATCH TOPICAL at 20:22

## 2023-01-05 RX ADMIN — FAMOTIDINE 20 MG: 40 POWDER, FOR SUSPENSION ORAL at 08:52

## 2023-01-05 RX ADMIN — PROCHLORPERAZINE EDISYLATE 10 MG: 5 INJECTION INTRAMUSCULAR; INTRAVENOUS at 18:52

## 2023-01-05 RX ADMIN — HYDROMORPHONE HYDROCHLORIDE 0.4 MG: 0.2 INJECTION, SOLUTION INTRAMUSCULAR; INTRAVENOUS; SUBCUTANEOUS at 10:32

## 2023-01-05 RX ADMIN — DIPHENHYDRAMINE HYDROCHLORIDE 25 MG: 50 INJECTION, SOLUTION INTRAMUSCULAR; INTRAVENOUS at 18:51

## 2023-01-05 ASSESSMENT — ACTIVITIES OF DAILY LIVING (ADL)
ADLS_ACUITY_SCORE: 30
ADLS_ACUITY_SCORE: 31
ADLS_ACUITY_SCORE: 30
ADLS_ACUITY_SCORE: 31
ADLS_ACUITY_SCORE: 31
ADLS_ACUITY_SCORE: 30
ADLS_ACUITY_SCORE: 31

## 2023-01-05 NOTE — PROVIDER NOTIFICATION
"\"7B 7231-2 Dinora Mcghee   pt diet order says NPO except for Meds and ice chips. Is this correct?     -Elin Pringle?\"  "

## 2023-01-05 NOTE — PHARMACY-ADMISSION MEDICATION HISTORY
Admission Medication History Completed by Pharmacy    See [facility] Admission Navigator for allergy information, preferred outpatient pharmacy, prior to admission medications and immunization status.     Medication History Sources:     Dispense report, patient    Changes made to PTA medication list (reason):    Added: None    Deleted: per patient  o Zolmitriptan 5 mg tablet - take 1 tablet by mouth daily as needed for migraine. Only take 2 tablets in a 24 hr period. May use 1 to 2 times per week    Changed: per patient  o Promethazine 25 mg tablet - take 25 mg by mouth -> take 25 mg by mouth at bedtime    Additional Information:    Patient takes promethazine 25 mg tablet + fluids every other day and promethazine 25 mg tablet at bedtime every day.     Patient confirmed she takes the ODT formulation of zolmitriptan, consistent with most recent dispenses. Removed regular tablets from med list. Note written on regular tablet order (Hold DOS) from 12/28/22 was moved to ODT order.    Patient reports only taking montelukast in the spring and fall    Patient takes prochlorperaine 10 mg tablets + diphenydramine 25 mg capsules together prn nausea    Prior to Admission medications    Medication Sig Last Dose Taking? Auth Provider Long Term End Date   acarbose (PRECOSE) 100 MG tablet Take 1 tablet (100 mg) by mouth 3 times daily (with meals) 12/29/2022 Yes Gloria Melissa MD Yes    amylase-lipase-protease (CREON) 23838-31634 units CPEP per EC capsule Take 3-4 capsules by mouth 3 times daily (with meals) With oral meals 12/29/2022 at 1900 Yes Rosanne Marti PA-C No    cyclobenzaprine (FLEXERIL) 10 MG tablet Take 1 tablet (10 mg) by mouth 2 times daily as needed for muscle spasms 12/30/2022 at 0400 Yes Monika Mahajan MD No    diphenhydrAMINE (BENADRYL ALLERGY) 25 MG capsule Take 1 capsule (25 mg) by mouth every 6 hours as needed for itching or allergies 12/30/2022 at 0400 Yes Nestor Phoenix MD     estradiol (VAGIFEM) 10  MCG TABS vaginal tablet INSERT 1 TABLET(10 MCG) VAGINALLY 2 TIMES A WEEK 12/27/2022 Yes Latasha Paul APRN CNP     famotidine (PEPCID) 20 MG tablet Take 1 tablet (20 mg) by mouth daily 12/30/2022 at 0330 Yes Rosanne Marti PA-C     glucagon 1 MG kit Give 0.1 to 0.15mg( 10-15 units on Insulin sryinge) subcutaneous  every 15 minutes PRN for hypoglycemia. Remix new kit q24hr. Needs up to 3 kit/week. Unknown Yes Gloria Melissa MD Yes    glucose 40 % GEL gel Take 15-30 g by mouth every 15 minutes as needed for low blood sugar Unknown Yes Melissa Sood PA-C     levothyroxine (SYNTHROID/LEVOTHROID) 137 MCG tablet Take 1 tablet (137 mcg) by mouth daily 12/30/2022 at 0300 Yes Latasha Paul APRN CNP Yes    Lidocaine (LIDOCARE) 4 % Patch Place 1 patch onto the skin every 24 hours To prevent lidocaine toxicity, patient should be patch free for 12 hrs daily. 12/29/2022 Yes Tania Jamison MD     methocarbamol (ROBAXIN) 750 MG tablet Take 1 tablet (750 mg) by mouth 4 times daily 12/30/2022 at 0300 Yes Monika Mahajan MD     Multiple Vitamin (MULTIVITAMIN ADULT PO) Take 1 tablet by mouth daily 12/28/2022 Yes Reported, Patient     omeprazole (PRILOSEC) 40 MG DR capsule Take 1 capsule (40 mg) by mouth daily 12/29/2022 at 2100 Yes Rosanne Marti PA-C No    prochlorperazine (COMPAZINE) 10 MG tablet TAKE 1/2 TABLET(5 MG) BY MOUTH EVERY 6 HOURS AS NEEDED FOR NAUSEA OR VOMITING 12/30/2022 at 0400 Yes Vijaya Genao MD     promethazine (PHENERGAN) 25 MG tablet Take 25 mg by mouth At Bedtime 12/29/2022 at 2100 Yes Reported, Patient     promethazine (PHENERGAN) 25 MG tablet Take 1 tablet (25 mg) by mouth daily as needed (take as needed for nausea/vomiting) 12/29/2022 Yes Mandeep Park MD     Prucalopride Succinate 2 MG TABS Take 1 tablet (2 mg) by mouth daily 12/30/2022 at 0400 Yes Rosanne Marti PA-C     scopolamine (TRANSDERM) 1 MG/3DAYS 72 hr patch Place 1 patch onto  "the skin every 72 hours 12/30/2022 at 1000 Yes Rosanne Marti PA-C No    ZOLMitriptan (ZOMIG-ZMT) 5 MG ODT Take 1 tablet (5 mg) by mouth at onset of headache for migraine May repeat in 2 hours. Max 2 tablets/24 hours. 12/28/2022 Yes Rachelle العلي APRN CNP Yes    azelastine (ASTELIN) 0.1 % nasal spray Spray 1 spray into both nostrils 2 times daily  Patient not taking: Reported on 1/4/2023 Not Taking  OverLatasha friedman APRN CNP No    blood glucose (PAT CONTOUR NEXT) test strip Use to test blood sugar 6 times daily or as directed.   Gloria Melissa MD     blood glucose monitoring (PAT MICROLET) lancets Use to test blood sugar 6-8 times daily or as directed.   Gloria Melissa MD     cetirizine (ZYRTEC) 10 MG tablet Take 1 tablet (10 mg) by mouth daily  Patient not taking: Reported on 1/4/2023 Not Taking  Latasha Paul APRN CNP No    Continuous Blood Gluc Sensor (FREESTYLE LISA 2 SENSOR) MISC Inject 1 patch into the skin every 14 days   Gloria Melissa MD     Hydroactive Dressings (TEGADERM HYDROCOLLOID THIN) MISC Use under sensor site , change every 14 days   Gloria Melissa MD     HYDROmorphone, STANDARD CONC, (DILAUDID) 1 MG/ML oral solution Take 1-2 mLs (1-2 mg) by mouth every 4 hours as needed for moderate to severe pain (Post G and J tube placement)  Patient not taking: Reported on 12/22/2022   Darcy Crawford, APRN CNP No    ibuprofen (ADVIL/MOTRIN) 400 MG tablet Take 1 tablet (400 mg) by mouth every 6 hours as needed for moderate pain   Altagracia العلي PA-C No    insulin syringe-needle U-100 (30G X 1/2\" 0.3 ML) 30G X 1/2\" 0.3 ML miscellaneous Use 3 syringes daily or as directed.   Gloria Melissa MD     montelukast (SINGULAIR) 10 MG tablet TAKE 1 TABLET(10 MG) BY MOUTH AT BEDTIME  Patient not taking: Reported on 1/4/2023 Not Taking  OverLatasha friedman, APRN CNP Yes    Nutritional Supplements (BOOST HIGH PROTEIN) LIQD After above baseline labs are drawn, " give: 6 mL/kg to maximum of 360 mL; the beverage is to be consumed within 5 minutes.   Gloria Melissa MD No    order for DME Equipment being ordered:Ruiz Rivas MD     scopolamine (TRANSDERM) 1 MG/3DAYS 72 hr patch Place 1 patch onto the skin every 72 hours   Vijaya Genao MD No    Sharps Container (BD SHARPS ) MISC 1 Container as needed   Melissa Sood PA-C     STATIN NOT PRESCRIBED (INTENTIONAL) Previous liver issues, risks vs benefits felt to not warrant statin.    Discussed Oct 2022 visit   Rossi Andrade MD Yes      Date completed: 01/04/23    Medication history completed by: Tania Traylor

## 2023-01-05 NOTE — TELEPHONE ENCOUNTER
Reminder call placed to pt.   Pt reminded of Provider, date and time of the appointment.   Pt was asked to arrive 15 minutes early to fill out the questionnaires.   Clinic phone number provided if pt needed to reschedule.      [FreeTextEntry1] : 1/4/23 - Patient needs cardiac clearance prior to cataract surgery.  Patient has been stable.  Patient denies CP, SOB, palpitations, or lightheadedness.  Her BP was elevated because she often misses noon doses of Metoprolol ER and Losartan 50 mg.  I advised patient to take Metoprolol ER 75 mg all at once instead of 25 mg TID.  I advised patient to take Losartan 50 mg either in AM or in PM so she won't miss the dose.  Patient is cleared for surgery and anesthesia.  She may hold ASA 81 mg for 7 days prior to surgery if needed.\par \par 10/5/22 - Patient has been stable.  Patient denies CP, SOB, palpitations, or lightheadedness.  Her BP was elevated.  Patient reports home SBP around 140-150.  I advised patient to increase Metoprolol ER to 75 mg QD.  I advised patient to check bone density.  She got flu shot already.  I will check BT.  A1C 6.5.  .\par \par 4/4/22 - Patient has L hip pain 9/10 that is not tender but she cannot change position and hard to walk since yesterday.  I advised patient to try Celebrex.\par \par \par 9/30/21 - Patient reports that last Monday she experienced left-sided back pain from midnight to 1 AM.  Patient denies exertional back pain.  Patient denies CP.  Patient reports MOSHER.  Patient denies palpitations.  Patient denies lightheadedness.  Patient denies h/o syncope.  She is not taking Metformin.  2/6 MAZIN noted diffusely.  ECG showed NSR and APC's.  Will check BT.  Get flu shot.  Patient declined echo STR.  She may need holter in the future.\par \par 1/31/21 - Patient has been stable.  Patient is not taking Metformin yet because she wants to try diet.  Patient denies CP, SOB, palpitations, or lightheadedness.  She is taking Metoprolol 25 mg BID.  I will check BT today.  Her BP was good.  Patient requests Metoprolol 25 mg BID instead of Metoprolol ER 50 mg.\par \par 8/24/20 - Patient is doing well. She declined mammogram and bone density test. She is on diuretics, Lipitor 5 mg, and Losartan. Patient reports  at home. I advised patient to increase dose of Metoprolol to 50 mg. I advised patient to get flu shot. Fu in 6 months.

## 2023-01-05 NOTE — PROGRESS NOTES
Surgery Progress Note  01/05/2023       Subjective:  POD6 S/p Ex-Lap, BAUDILIO, SBR, jejunostomy tube placement   NAEON, stable vitals, afebrile  Had BM, passing gas, ambulating several times a day, had adequate UOP  Pain well controlled with oral meds  J-Tube (RUQ) not leaking anymore        Objective:  Temp:  [98.3  F (36.8  C)-99.3  F (37.4  C)] 99.3  F (37.4  C)  Pulse:  [] 107  Resp:  [16-18] 17  BP: (120-132)/(63-73) 123/63  SpO2:  [95 %-98 %] 95 %    I/O last 3 completed shifts:  In: 260 [I.V.:240]  Out: 1850 [Urine:1600; Emesis/NG output:250]      Gen: Awake, alert, NAD  Resp: NLB on RA  Abd: soft, nondistended, approprietly tender   Incision: c/d/I  Gastric tube (RUQ): gastric output, 525/75 ml   J-Tube (LUQ): In place  Ext: WWP, no edema     Labs:  Recent Labs   Lab 01/04/23  0848 01/03/23  1013 01/01/23  0827   WBC 6.8 6.7 8.6   HGB 12.5 12.7 11.5*    337 273       Recent Labs   Lab 01/05/23  0645 01/05/23  0201 01/04/23  2255 01/04/23  1810 01/04/23  0848 01/03/23  1751 01/03/23  1013     --   --   --  138  --  140   POTASSIUM 4.2  --   --   --  4.1  --  4.0   CHLORIDE 100  --   --   --  100  --  99   CO2 26  --   --   --  25  --  25   BUN 8.2  --   --   --  7.9  --  6.0   CR 0.57  --   --   --  0.53  --  0.52   * 143* 122*   < > 127*   < > 127*   KASSI 9.4  --   --   --  8.8  --  9.2   MAG 1.9  --   --   --  1.8  --  1.8   PHOS 4.3  --   --   --  3.9  --  3.2    < > = values in this interval not displayed.          Assessment/Plan:     57 year old female with h/o migraine, chronic pancreatitis S/p TPIAT,splenectomy, gastrojejunostomy, R&Y, c/b gastroparesis, malnutrition. Patient underwent Ex-Lap, BAUDILIO, SBR, and J - feeding tube placement on 12/30. Progressing well post operatively. Had some leakage from J tube with fast medication pushes, improved when administered slower. Pain contorl is not optimal, will add Robaxin solutio.     Neuro:  Robaxin 500mg, Q6hr PRN solution through  J-tube (Pharmicist Geo Mason confirmed I.V robaxin can be given through J-Tube )                Dilaudid 0.2-0.4mg/I.V/ Q 2hr,PRN               Oxy 5-10mg solution Q4hrs, PRN (through J-tube)               J site Lidocaine patch               PTA caffeine citrate syrup through J- feeding tube                PTA [id number]-ZMT     CV: Vitals Q4hrs     PULM: IS, ambulate QID     GI/FEN:  - Cont TFs @ 20 ml/hr, keep at this rate today while awaiting ROBF.                - Cont PPI solution through J-Tube (LUQ)                - Cont home PTA promethazine 25mg syrup through J- tube.      - Scopolomine patch, prn compazine     IVF: LR @ 30ml, will wean when closer to TF goal    Endocrine: - Appreciate endocrine team recs for glucose level management                     - Continue Levothyroxine through J-Tube     Lines: Cont PICC line care per protocol     Drains:  Push medications through the J-tube slowly                Gastric tube in the RUQ for gravity (Nothing per this tube)                 J- Tube in the LUQ for feeding and meds                  Wound: Open to air. Staples in place.   Activity: OOB, ambulate as tolerated   DVT Ppx: Lovenox 40 mg Q24hrs    Seen, examined, and discussed with chief resident, who discussed with staff Dr. Bauer     - - - - - - - - - - - - - - - - - -  Solitario Garcia MD  Surgery resident, PGY-1  [facility]     See [facility] for on-call pager information: [facility] Paging/Directory - General Surgery [facility]

## 2023-01-05 NOTE — PLAN OF CARE
Goal Outcome Evaluation: Ongoing   Alert and oriented x4, on room air. Able to make needs known. On tube feeding, increase to 20 mL/hr. Abdominal pain 7-9/10, PRN IV dilaudid, oxycodone, and robaxin given. C/o nausea, PRN  IV benadryl and compazine given. G-tube to gravity, 150 greenish/tan output. Port running 30 mL LR. No BM this shift. Voiding not saving. Continue to monitor.

## 2023-01-05 NOTE — CARE PLAN
Time: 1006-7511  Activity: up ad koffi   Pain: LUQ, RUQ and lower back pain. Managed with dilaudid, oxycodone and Tylenol.  Neuro: A/O x 4, Denies N/T. Makes needs known and calls appropriately.   Cardiac: Denies Cardiac chest pain.   Respiratory: Denies SOB, on RA.   GI/: -BM, +BS and passing gas. Voiding Spontaneously. AUOP.  Diet: NPO, on tube feed running @ 20ml/hr and w/30 ml free water q4.  Lines: Right Chest port. Running LR 30 ml/hr   Incisions/Drains/Skin: midline incision, G-tube and  J-tube. J-tube incision site leaking a small amount of feeding formula. Cleaned and gauze applied.   Lab: Reviewed   New changes this shift: No significant changes this shift, Continue POC

## 2023-01-06 ENCOUNTER — APPOINTMENT (OUTPATIENT)
Dept: OCCUPATIONAL THERAPY | Facility: CLINIC | Age: 58
End: 2023-01-06
Attending: SURGERY
Payer: COMMERCIAL

## 2023-01-06 LAB
GLUCOSE BLDC GLUCOMTR-MCNC: 128 MG/DL (ref 70–99)
GLUCOSE BLDC GLUCOMTR-MCNC: 132 MG/DL (ref 70–99)
GLUCOSE BLDC GLUCOMTR-MCNC: 137 MG/DL (ref 70–99)
GLUCOSE BLDC GLUCOMTR-MCNC: 138 MG/DL (ref 70–99)
GLUCOSE BLDC GLUCOMTR-MCNC: 146 MG/DL (ref 70–99)
MAGNESIUM SERPL-MCNC: 2 MG/DL (ref 1.7–2.3)

## 2023-01-06 PROCEDURE — 97110 THERAPEUTIC EXERCISES: CPT | Mod: GO

## 2023-01-06 PROCEDURE — 250N000011 HC RX IP 250 OP 636: Performed by: SURGERY

## 2023-01-06 PROCEDURE — 36591 DRAW BLOOD OFF VENOUS DEVICE: CPT | Performed by: SURGERY

## 2023-01-06 PROCEDURE — 250N000013 HC RX MED GY IP 250 OP 250 PS 637

## 2023-01-06 PROCEDURE — 258N000003 HC RX IP 258 OP 636

## 2023-01-06 PROCEDURE — 97535 SELF CARE MNGMENT TRAINING: CPT | Mod: GO

## 2023-01-06 PROCEDURE — 120N000002 HC R&B MED SURG/OB UMMC

## 2023-01-06 PROCEDURE — 83735 ASSAY OF MAGNESIUM: CPT | Performed by: SURGERY

## 2023-01-06 PROCEDURE — 250N000011 HC RX IP 250 OP 636

## 2023-01-06 PROCEDURE — 250N000013 HC RX MED GY IP 250 OP 250 PS 637: Performed by: SURGERY

## 2023-01-06 RX ORDER — HYDROMORPHONE HCL IN WATER/PF 6 MG/30 ML
.2-.4 PATIENT CONTROLLED ANALGESIA SYRINGE INTRAVENOUS EVERY 4 HOURS PRN
Status: DISCONTINUED | OUTPATIENT
Start: 2023-01-06 | End: 2023-01-06

## 2023-01-06 RX ORDER — HYDROMORPHONE HCL IN WATER/PF 6 MG/30 ML
0.2 PATIENT CONTROLLED ANALGESIA SYRINGE INTRAVENOUS EVERY 6 HOURS PRN
Status: DISCONTINUED | OUTPATIENT
Start: 2023-01-06 | End: 2023-01-09

## 2023-01-06 RX ORDER — ZOLMITRIPTAN 2.5 MG/1
5 TABLET, ORALLY DISINTEGRATING ORAL DAILY PRN
Status: DISCONTINUED | OUTPATIENT
Start: 2023-01-06 | End: 2023-01-09 | Stop reason: HOSPADM

## 2023-01-06 RX ADMIN — OMEPRAZOLE 40 MG: KIT at 20:43

## 2023-01-06 RX ADMIN — PROCHLORPERAZINE EDISYLATE 10 MG: 5 INJECTION INTRAMUSCULAR; INTRAVENOUS at 04:18

## 2023-01-06 RX ADMIN — HYDROMORPHONE HYDROCHLORIDE 0.4 MG: 0.2 INJECTION, SOLUTION INTRAMUSCULAR; INTRAVENOUS; SUBCUTANEOUS at 08:25

## 2023-01-06 RX ADMIN — FAMOTIDINE 20 MG: 40 POWDER, FOR SUSPENSION ORAL at 09:08

## 2023-01-06 RX ADMIN — OXYCODONE HYDROCHLORIDE 10 MG: 5 SOLUTION ORAL at 09:42

## 2023-01-06 RX ADMIN — PROCHLORPERAZINE EDISYLATE 10 MG: 5 INJECTION INTRAMUSCULAR; INTRAVENOUS at 10:41

## 2023-01-06 RX ADMIN — METHOCARBAMOL 500 MG: 100 INJECTION, SOLUTION INTRAMUSCULAR; INTRAVENOUS at 19:00

## 2023-01-06 RX ADMIN — ACETAMINOPHEN 650 MG: 325 SUSPENSION ORAL at 20:42

## 2023-01-06 RX ADMIN — ENOXAPARIN SODIUM 40 MG: 40 INJECTION SUBCUTANEOUS at 11:44

## 2023-01-06 RX ADMIN — PROCHLORPERAZINE EDISYLATE 10 MG: 5 INJECTION INTRAMUSCULAR; INTRAVENOUS at 21:09

## 2023-01-06 RX ADMIN — HYDROMORPHONE HYDROCHLORIDE 0.4 MG: 0.2 INJECTION, SOLUTION INTRAMUSCULAR; INTRAVENOUS; SUBCUTANEOUS at 04:17

## 2023-01-06 RX ADMIN — OXYCODONE HYDROCHLORIDE 10 MG: 5 SOLUTION ORAL at 14:48

## 2023-01-06 RX ADMIN — CAFFEINE CITRATE 320 MG: 20 SOLUTION ORAL at 09:07

## 2023-01-06 RX ADMIN — LEVOTHYROXINE SODIUM 137 MCG: 100 SOLUTION ORAL at 09:08

## 2023-01-06 RX ADMIN — HYDROMORPHONE HYDROCHLORIDE 0.2 MG: 0.2 INJECTION, SOLUTION INTRAMUSCULAR; INTRAVENOUS; SUBCUTANEOUS at 20:28

## 2023-01-06 RX ADMIN — LIDOCAINE PATCH 4% 1 PATCH: 40 PATCH TOPICAL at 20:30

## 2023-01-06 RX ADMIN — OXYCODONE HYDROCHLORIDE 10 MG: 5 SOLUTION ORAL at 19:00

## 2023-01-06 RX ADMIN — SODIUM CHLORIDE, POTASSIUM CHLORIDE, SODIUM LACTATE AND CALCIUM CHLORIDE 1000 ML: 600; 310; 30; 20 INJECTION, SOLUTION INTRAVENOUS at 15:01

## 2023-01-06 RX ADMIN — SENNOSIDES 5 ML: 8.8 LIQUID ORAL at 20:42

## 2023-01-06 RX ADMIN — SENNOSIDES 5 ML: 8.8 LIQUID ORAL at 09:07

## 2023-01-06 ASSESSMENT — ACTIVITIES OF DAILY LIVING (ADL)
ADLS_ACUITY_SCORE: 30

## 2023-01-06 NOTE — PROGRESS NOTES
CLINICAL NUTRITION SERVICES - REASSESSMENT NOTE     Nutrition Prescription    RECOMMENDATIONS FOR MDs/PROVIDERS TO ORDER:  - Recommend MIVF or monitor for need for boluses with current TF/FWF not meeting fluid needs (discussed with resident--scheduled 1 L bolus LR q day starting tomorrow).    Malnutrition Status:    Severe malnutrition in the context of acute on chronic illness.     Recommendations already ordered by Registered Dietitian (RD):  - TF advance per gen surg group.     Future/Additional Recommendations:  - Will hold off on adding Prosource TF 20 with current leakage at J tube site. Discuss first with team before ordering possibly Monday.     Pt feels like her brain is starved for carbohydrates.  She thinks J tube (really a G tube) has been leaking since it was first placed and the TF rate was decreased back to 10 ml/hr due to leakage.  She said Dr Bauer hoped the tract would heal quickly around the FT incision site.     EVALUATION OF THE PROGRESS TOWARD GOALS   Diet: NPO except ice chips.    Nutrition Support: Vital 1.5 up to 30 ml/hr today.  RD recommended goal rate is 45 ml/hr.  Current rate of 30 ml/hr provides: 720 ml TF volume, 1080 kcal (17 kcal/kg), 49 gm protein (0.8 gm Pro/kg), 135 g CHO, 550 ml free water  FWF: 30 ml q 4 hr (180 ml/d).    Total free water from scheduled FWF and TF: 730 ml  Intake:Since 12/31, TF rate has varied from 10-30 ml/hr with average over past 6 days of ~17 ml/hr.  This would provide average over past 6 days of just 408 ml formula, 612 kcal/day (~10 kcal/kg), 28 gm protein (0.4 gm Pro/kg), 76 gm CHO/d, 2 gm fiber, 312 ml free water.     Total nutrition provided over past since days meeting < 50% of estimated needs.     IVF of 30 ml/hr discontinued in past.   Today received 1 L bolus of LR.      NEW FINDINGS   -General: Pt received   -Wt: Wt surprisingly stable despite inadequate nutrition and fluids. Wt down 3.2% within the past month (not significant), down 2.3% in 1  week upon admission.   01/04/23 0934 63.8 kg (140 lb 11.2 oz) --   01/02/23 1100 64.2 kg (141 lb 9.6 oz) Standing scale   12/31/22 1158 64.4 kg (141 lb 14.4 oz) --   12/30/22 1259 63.7 kg (140 lb 6.9 oz) Standing scale     12/09/22 65.9 kg (145 lb 4.5 oz)     -Labs: K+, Phos wnl 1/5, Mg wnl 1/6    -GI: h/o gastroparesis with G tube still open to drainage. Ileus noted and TF rate decreased from 20 ml/hr back to 10 ml/hr on 1/4. Did have small BM later on 1/4. Noted J tube noted to be leaking since placed when Rx flushed. Gen surg advised to give flushes slowly.  No leakage was noted with TF although likely due to low rate.  Today, per care rounds, TF may be leaking also now up to 30 ml/hr.       -Skin: Skin irritated around J tube site due to moisture.   -Meds: caffeine citrate q day (to avoid caffeine withdrawal headaches), pepcid, levothyroxine liquid, omperazole (liquid), senokot syrup q day. PRN: liquid tylenol.  Pt also using Relizorb cartridge with TF (1 cartridge x 24 hr for TF at 20 ml/hr or less and 1 cartridge q 12 hr for rates > 20 ml/hr).     - EN Access: J tube surgically placed 12/30 (Surgeon used a G tube but placed in jejunum with deflated balloon.  Pt has a pre-existing G tube for decompression.  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 Phoenix.    -Endocrine: Hasn't used insulin since 2017. Per Endo consult note 12/31, pt prefers not to use insulin unless BG are > 250 (wouldn't agree to lower threshold).  At home uses microdoses of glucagon when blood sugars are < 70 until they reach 80s (2/2 severe gastroparesis).  Inpatient Diabetes service not following since that consult. Pt notes she doesn't feel hypoglycemia.     MALNUTRITION  % Intake: </= 50% for >/= 5  days (severe)  % Weight Loss: > 2% in 1 week (severe)--wt prior to admission  Subcutaneous Fat Loss: Facial region:  Mild periorbital  Muscle Loss: Thoracic region (clavicle, acromium bone, deltoid, trapezius, pectoral):  mild  Fluid Accumulation/Edema: None noted  Malnutrition Diagnosis: Severe malnutrition in the context of acute on chronic illness.     Previous Goals   Total avg nutritional intake to meet a minimum of 25 kcal/kg and 1.2 gm PRO/kg daily (per dosing wt 64 kg admit standing wt ).  Evaluation: Not met    Previous Nutrition Diagnosis  Altered GI function related to severe delayed gastric emptying with associated N/V as evidenced by patient with limited PO for the past 2 month, reliant on TF to provide full nutrition needs   Evaluation: No change    CURRENT NUTRITION DIAGNOSIS  Inadequate enteral nutrition infusion related to leakage at FT site and poor gut motility as evidenced by TF not advanced to goal after 1 week and meeting < 50% of estimated needs.       INTERVENTIONS  Implementation  Collaboration with other providers  TF advance and FWF per Gen surg group.    Goals  Total avg nutritional intake to meet a minimum of 25 kcal/kg and 1.2 g PRO/kg daily (per dosing wt 64 kg).    Monitoring/Evaluation  Progress toward goals will be monitored and evaluated per protocol.    Zoë Plascencia RD, LD   7B (M-F) Pager: [phone]  RD Weekend/Holiday Pager: [phone]

## 2023-01-06 NOTE — CARE PLAN
Time: 0697-8603  Activity: up ad koffi   Pain: LUQ, RUQ and lower back pain. Managed with dilaudid, oxycodone and Tylenol.  Neuro: A/O x 4, Denies N/T. Makes needs known and calls appropriately.   Cardiac: Denies Cardiac chest pain.   Respiratory: Denies SOB, on RA.   GI/: -BM this shift, +BS and passing gas. Voiding Spontaneously. AUOP.  Diet: NPO, on tube feed running @ 30ml/hr and w/30 ml free water q4.  Lines: Right Chest port. Running LR TKO    Incisions/Drains/Skin: midline incision, G-tube and  J-tube. J-tube incision site leaking a small amount of feeding formula. Cleaned and gauze applied.   Lab: Reviewed   New changes this shift: No significant changes this shift, Continue POC

## 2023-01-06 NOTE — PROGRESS NOTES
Surgery Progress Note  01/06/2023       Subjective:  POD7 S/p Ex-Lap, BAUDILIO, SBR, jejunostomy tube placement   NAEON, stable vitals, afebrile, ambulating, had adequate UOP  Pain 4/10 with oral meds, less nausea, denies vomiting, CP, or SOB       Objective:  Temp:  [98.2  F (36.8  C)-98.8  F (37.1  C)] 98.8  F (37.1  C)  Pulse:  [87-93] 93  Resp:  [16-18] 18  BP: (112-128)/(64-72) 126/67  SpO2:  [94 %-99 %] 94 %    I/O last 3 completed shifts:  In: 300 [NG/GT:140]  Out: 650 [Urine:500; Emesis/NG output:150]      Gen: Awake, alert, NAD  Resp: NLB on RA  Abd: soft, nondistended, approprietly tender   Incision: c/d/I  Gastric tube (RUQ): gastric output, 225/75  ml   J-Tube (LUQ): In place  Ext: WWP, no edema     Labs:  Recent Labs   Lab 01/04/23  0848 01/03/23  1013 01/01/23  0827   WBC 6.8 6.7 8.6   HGB 12.5 12.7 11.5*    337 273       Recent Labs   Lab 01/06/23  0817 01/06/23  0710 01/06/23  0447 01/05/23  1801 01/05/23  0645 01/04/23  1810 01/04/23  0848 01/03/23  1751 01/03/23  1013   NA  --   --   --   --  138  --  138  --  140   POTASSIUM  --   --   --   --  4.2  --  4.1  --  4.0   CHLORIDE  --   --   --   --  100  --  100  --  99   CO2  --   --   --   --  26  --  25  --  25   BUN  --   --   --   --  8.2  --  7.9  --  6.0   CR  --   --   --   --  0.57  --  0.53  --  0.52   GLC  --  138* 137* 128* 124*   < > 127*   < > 127*   KASSI  --   --   --   --  9.4  --  8.8  --  9.2   MAG 2.0  --   --   --  1.9  --  1.8  --  1.8   PHOS  --   --   --   --  4.3  --  3.9  --  3.2    < > = values in this interval not displayed.          Assessment/Plan:     57 year old female with h/o migraine, chronic pancreatitis S/p TPIAT,splenectomy, gastrojejunostomy, R&Y, c/b gastroparesis, malnutrition. Patient underwent Ex-Lap, BAUDILIO, SBR, and J - feeding tube placement on 12/30. Progressing well post operatively. Had some leakage from J tube with fast medication pushes, improved when administered slower. Pain contorl is not optimal,  will add Robaxin solutio.     Neuro:  Robaxin 500mg, Q6hr PRN solution through J-tube (Pharmicist Geo Mason confirmed I.V robaxin can be given through J-Tube )                Dilaudid 0.2-0.4mg/I.V/ Q 4hr,PRN               Oxy 5-10mg solution Q4hrs, PRN (through J-tube)               J site Lidocaine patch               PTA caffeine citrate syrup through J- feeding tube                PTA Zomig-ZMT     CV: Vitals Q4hrs     PULM: IS, ambulate QID     GI/FEN:  - Clamp the Gastric tube(RUQ), if patient nauseous/distended/feelong full at anytime, un -clamp and set back to gravity                  - Start Sennosides (Through the J-tube (LUQ). Please go very slow when giving meds through the J-tube                  - Start TFs @ 30 ml/hr. Dont advance without MD order                 - Give 1 L of LR over 4 hours                  - Cont PPI solution through J-Tube (LUQ)                 - Cont home PTA promethazine 25mg syrup through J- tube.       - Scopolomine patch, prn compazine     IVF: discontinue LR @ 30ml   [id number]  Endocrine: - Appreciate endocrine team recs for glucose level management                     - Continue Levothyroxine through J-Tube     Lines: Cont PICC line care per protocol     Drains:  Push medications through the J-tube slowly                Gastric tube in the RUQ for gravity (Nothing per this tube)                 J- Tube in the LUQ for feeding and meds                  Wound: Open to air. Staples in place.    Activity: OOB, ambulate as tolerated   DVT Ppx: Lovenox 40 mg Q24hrs    Seen, examined, and discussed with chief resident, who discussed with staff Dr. Bauer     - - - - - - - - - - - - - - - - - -  Solitario Garcia MD  Surgery resident, PGY-1  [facility]     See [facility] for on-call pager information: [facility] Paging/Directory - General Surgery [facility]

## 2023-01-06 NOTE — PLAN OF CARE
"Goal Outcome Evaluation:      Plan of Care Reviewed With: patient    Overall Patient Progress: no changeOverall Patient Progress: no change         /60 (BP Location: Right arm)   Pulse 98   Temp 98.5  F (36.9  C) (Oral)   Resp 16   Ht 1.702 m (5' 7\")   Wt 63.8 kg (140 lb 11.2 oz)   SpO2 97%   BMI 22.04 kg/m        Status: Stable, VS WDL. Admitted for feeding intolerance.   Pain/Nausea:  Managed with regimens as ordered.   Mobility: SBA, ambulates.   Diet: NPO, on FT, running at 30 mL/hr via JT.   Labs: Reviewed, Mag 2.0.   LDAs: G/J tubes x2,  R Chest Port.   Skin/incisions:  Midline abdominal incision, PEG tubes x2.   Neuro: Intact, A&O x4. Able to communicate needs.  Respiratory: WDL, on RA, denies SOB.   Cardiac: WDL. Denies cardiac related chest pain.   GI/: Continence of B&B, voiding, LBM 01/06/23.   New Changes: LR  BOLUS 1000 mL at 250 mL/hr.    Plan:  Maintain NPO and continue to POC.   "

## 2023-01-07 ENCOUNTER — MYC MEDICAL ADVICE (OUTPATIENT)
Dept: TRANSPLANT | Facility: CLINIC | Age: 58
End: 2023-01-07

## 2023-01-07 LAB
GLUCOSE BLDC GLUCOMTR-MCNC: 122 MG/DL (ref 70–99)
GLUCOSE BLDC GLUCOMTR-MCNC: 158 MG/DL (ref 70–99)

## 2023-01-07 PROCEDURE — 258N000003 HC RX IP 258 OP 636: Performed by: SURGERY

## 2023-01-07 PROCEDURE — 250N000009 HC RX 250

## 2023-01-07 PROCEDURE — 250N000013 HC RX MED GY IP 250 OP 250 PS 637: Performed by: SURGERY

## 2023-01-07 PROCEDURE — 250N000011 HC RX IP 250 OP 636: Performed by: SURGERY

## 2023-01-07 PROCEDURE — 258N000003 HC RX IP 258 OP 636

## 2023-01-07 PROCEDURE — 250N000011 HC RX IP 250 OP 636

## 2023-01-07 PROCEDURE — 250N000013 HC RX MED GY IP 250 OP 250 PS 637

## 2023-01-07 PROCEDURE — 120N000002 HC R&B MED SURG/OB UMMC

## 2023-01-07 RX ADMIN — OXYCODONE HYDROCHLORIDE 10 MG: 5 SOLUTION ORAL at 08:33

## 2023-01-07 RX ADMIN — OXYCODONE HYDROCHLORIDE 10 MG: 5 SOLUTION ORAL at 00:26

## 2023-01-07 RX ADMIN — LIDOCAINE PATCH 4% 1 PATCH: 40 PATCH TOPICAL at 19:50

## 2023-01-07 RX ADMIN — PROCHLORPERAZINE EDISYLATE 10 MG: 5 INJECTION INTRAMUSCULAR; INTRAVENOUS at 22:51

## 2023-01-07 RX ADMIN — SCOPALAMINE 1 PATCH: 1 PATCH, EXTENDED RELEASE TRANSDERMAL at 10:35

## 2023-01-07 RX ADMIN — PROCHLORPERAZINE EDISYLATE 10 MG: 5 INJECTION INTRAMUSCULAR; INTRAVENOUS at 06:00

## 2023-01-07 RX ADMIN — PROMETHAZINE HYDROCHLORIDE 25 MG: 25 INJECTION INTRAMUSCULAR; INTRAVENOUS at 14:04

## 2023-01-07 RX ADMIN — DIPHENHYDRAMINE HYDROCHLORIDE 25 MG: 50 INJECTION, SOLUTION INTRAMUSCULAR; INTRAVENOUS at 10:11

## 2023-01-07 RX ADMIN — LEVOTHYROXINE SODIUM 137 MCG: 100 SOLUTION ORAL at 08:11

## 2023-01-07 RX ADMIN — CAFFEINE CITRATE 320 MG: 20 SOLUTION ORAL at 08:11

## 2023-01-07 RX ADMIN — HYDROMORPHONE HYDROCHLORIDE 0.2 MG: 0.2 INJECTION, SOLUTION INTRAMUSCULAR; INTRAVENOUS; SUBCUTANEOUS at 19:49

## 2023-01-07 RX ADMIN — SENNOSIDES 5 ML: 8.8 LIQUID ORAL at 20:45

## 2023-01-07 RX ADMIN — METHOCARBAMOL 500 MG: 100 INJECTION, SOLUTION INTRAMUSCULAR; INTRAVENOUS at 18:25

## 2023-01-07 RX ADMIN — SODIUM CHLORIDE, POTASSIUM CHLORIDE, SODIUM LACTATE AND CALCIUM CHLORIDE 1000 ML: 600; 310; 30; 20 INJECTION, SOLUTION INTRAVENOUS at 12:08

## 2023-01-07 RX ADMIN — OXYCODONE HYDROCHLORIDE 10 MG: 5 SOLUTION ORAL at 22:50

## 2023-01-07 RX ADMIN — ACETAMINOPHEN 650 MG: 325 SUSPENSION ORAL at 20:45

## 2023-01-07 RX ADMIN — OXYCODONE HYDROCHLORIDE 10 MG: 5 SOLUTION ORAL at 14:03

## 2023-01-07 RX ADMIN — HYDROMORPHONE HYDROCHLORIDE 0.2 MG: 0.2 INJECTION, SOLUTION INTRAMUSCULAR; INTRAVENOUS; SUBCUTANEOUS at 05:54

## 2023-01-07 RX ADMIN — SENNOSIDES 5 ML: 8.8 LIQUID ORAL at 08:11

## 2023-01-07 RX ADMIN — FAMOTIDINE 20 MG: 40 POWDER, FOR SUSPENSION ORAL at 08:11

## 2023-01-07 RX ADMIN — OMEPRAZOLE 40 MG: KIT at 20:45

## 2023-01-07 RX ADMIN — OXYCODONE HYDROCHLORIDE 10 MG: 5 SOLUTION ORAL at 18:25

## 2023-01-07 RX ADMIN — METHOCARBAMOL 500 MG: 100 INJECTION, SOLUTION INTRAMUSCULAR; INTRAVENOUS at 08:33

## 2023-01-07 RX ADMIN — ENOXAPARIN SODIUM 40 MG: 40 INJECTION SUBCUTANEOUS at 12:36

## 2023-01-07 ASSESSMENT — ACTIVITIES OF DAILY LIVING (ADL)
ADLS_ACUITY_SCORE: 30

## 2023-01-07 NOTE — PLAN OF CARE
Goal Outcome Evaluation:      Plan of Care Reviewed With: patient    Overall Patient Progress: no changeOverall Patient Progress: no change    Outcome Evaluation: Still some leakage around J tube. Not meeting estimated needs.

## 2023-01-07 NOTE — PROGRESS NOTES
Surgery Progress Note  01/07/2023       Subjective:  POD8 S/p Ex-Lap, BAUDILIO, SBR, jejunostomy tube placement   NAEON, stable vitals, afebrile, ambulating, had adequate UOP  Pain 4/10 with oral meds, had some nausea this morning, had BM  denies vomiting, CP, or SOB       Objective:  Temp:  [96.6  F (35.9  C)-98.5  F (36.9  C)] 97.7  F (36.5  C)  Pulse:  [87-99] 87  Resp:  [16] 16  BP: (109-125)/(56-61) 117/61  SpO2:  [93 %-100 %] 100 %    I/O last 3 completed shifts:  In: 60 [NG/GT:60]  Out: 75 [Emesis/NG output:75]      Gen: Awake, alert, NAD  Resp: NLB on RA  Abd: soft, nondistended, approprietly tender   Incision: c/d/I  Gastric tube (RUQ): gastric output, 75/   ml   J-Tube (LUQ): In place  Ext: WWP, no edema     Labs:  Recent Labs   Lab 01/04/23  0848 01/03/23  1013 01/01/23  0827   WBC 6.8 6.7 8.6   HGB 12.5 12.7 11.5*    337 273       Recent Labs   Lab 01/07/23  1104 01/06/23  1942 01/06/23  1518 01/06/23  1140 01/06/23  0817 01/05/23  1801 01/05/23  0645 01/04/23  1810 01/04/23  0848 01/03/23  1751 01/03/23  1013   NA  --   --   --   --   --   --  138  --  138  --  140   POTASSIUM  --   --   --   --   --   --  4.2  --  4.1  --  4.0   CHLORIDE  --   --   --   --   --   --  100  --  100  --  99   CO2  --   --   --   --   --   --  26  --  25  --  25   BUN  --   --   --   --   --   --  8.2  --  7.9  --  6.0   CR  --   --   --   --   --   --  0.57  --  0.53  --  0.52   * 132* 128*   < >  --    < > 124*   < > 127*   < > 127*   KASSI  --   --   --   --   --   --  9.4  --  8.8  --  9.2   MAG  --   --   --   --  2.0  --  1.9  --  1.8  --  1.8   PHOS  --   --   --   --   --   --  4.3  --  3.9  --  3.2    < > = values in this interval not displayed.          Assessment/Plan:     57 year old female with h/o migraine, chronic pancreatitis S/p TPIAT,splenectomy, gastrojejunostomy, R&Y, c/b gastroparesis, malnutrition. Patient underwent Ex-Lap, BAUDILIO, SBR, and J - feeding tube placement on 12/30. Progressing well  post operatively. Had some leakage from J tube with fast medication pushes, improved when administered slower. Pain contorl is not optimal, will add Robaxin solution.    This morning had some nausea, also had one BM this morning.     Neuro:  Robaxin 500mg, Q6hr PRN solution through J-tube (Pharmicist Geo Mason confirmed I.V robaxin can be given through J-Tube )                Dilaudid 0.2-0.4mg/I.V/ Q 4hr,PRN               Oxy 5-10mg solution Q4hrs, PRN (through J-tube)               J site Lidocaine patch               PTA caffeine citrate syrup through J- feeding tube                PTA Zomig-ZMT     CV: Vitals Q4hrs     PULM: IS, ambulate QID     GI/FEN:  - Will consider Phenergan through PICC line, waiting for pharmacy confirmation                 - Clamp the Gastric tube(RUQ) PRN, if patient nauseous/distended/feelong full at anytime, un -clamp and set back to gravity                  - Start Sennosides (Through the J-tube (LUQ). Please go very slow when giving meds through the J-tube                  - Start TFs @ 30 ml/hr. Dont advance without MD order                 - Give 1 L of LR over 4 hours                  - Cont PPI solution through J-Tube (LUQ)      - Scopolomine patch, prn compazine       Endocrine: - Appreciate endocrine team recs for glucose level management                     - Continue Levothyroxine through J-Tube     Lines: Cont PICC line care per protocol     Drains:  Push medications through the J-tube slowly                Gastric tube in the RUQ for gravity (Nothing per this tube)                 J- Tube in the LUQ for feeding and meds                  Wound: Open to air. Staples in place.    Activity: OOB, ambulate as tolerated   DVT Ppx: Lovenox 40 mg Q24hrs    Seen, examined, and discussed with chief resident, who discussed with staff Dr. Saravia     - - - - - - - - - - - - - - - - - -  Solitario Garcia MD  Surgery resident, PGY-1  [facility]     See [employer] for  on-call pager information: [facility] Paging/Directory - General Surgery [facility]

## 2023-01-07 NOTE — PLAN OF CARE
"Goal Outcome Evaluation:      Plan of Care Reviewed With: patient    Overall Patient Progress: no changeOverall Patient Progress: no change         /68 (BP Location: Right arm)   Pulse 94   Temp 97.9  F (36.6  C) (Oral)   Resp 16   Ht 1.702 m (5' 7\")   Wt 63.8 kg (140 lb 11.2 oz)   SpO2 97%   BMI 22.04 kg/m        Status: Stable, VS WDL. Admitted for feeding intolerance.   Pain/Nausea:  Managed with regimens as ordered.   Mobility: Up ad koffi, ambulates.   Diet: NPO, on TF, running at 30 mL/hr via JT.   Labs:  Reviewed.   LDAs: G/J tubes x2, R Chest Port; infusing.   Skin/incisions: Mid abdominal incision, PEG tubes x2.   Neuro:  Intact, A&O x4, able to communicate needs.   Respiratory: WDL, on RA, denies SOB.   Cardiac: WDL, denies cardiac chest pain.   GI/: Continent of B&B, independent, LB 01/07/23.   New Changes: None.   Plan:  May discharge to home on Monday.  "

## 2023-01-07 NOTE — PROGRESS NOTES
"/56 (BP Location: Right arm)   Pulse 99   Temp (!) 96.6  F (35.9  C) (Oral)   Resp 16   Ht 1.702 m (5' 7\")   Wt 63.8 kg (140 lb 11.2 oz)   SpO2 93%   BMI 22.04 kg/m       Neuro: AOx4, cooperative with cares  Cardiac: WDL  Respiratory: on RA, WDL  GI/: continent of B&B, passing gas +BS. Voiding AUOP   Diet/Appetite: NPO on TF running at 30 mL/hr & 30 ml/hr free water q4hr. Compazine x2 given for nausea   Skin: midline incision JACKIE w/staples  LDA: G-tube to gravity bag, J-tube incision site dressing change x2  Activity: SBA  Pain: abd pain being managed with IV dilaudid & Oxycodone  Plan: continue POC     "

## 2023-01-08 LAB
GLUCOSE BLDC GLUCOMTR-MCNC: 113 MG/DL (ref 70–99)
GLUCOSE BLDC GLUCOMTR-MCNC: 122 MG/DL (ref 70–99)
GLUCOSE BLDC GLUCOMTR-MCNC: 131 MG/DL (ref 70–99)
GLUCOSE BLDC GLUCOMTR-MCNC: 176 MG/DL (ref 70–99)

## 2023-01-08 PROCEDURE — 258N000003 HC RX IP 258 OP 636

## 2023-01-08 PROCEDURE — 250N000011 HC RX IP 250 OP 636

## 2023-01-08 PROCEDURE — 250N000011 HC RX IP 250 OP 636: Performed by: SURGERY

## 2023-01-08 PROCEDURE — 258N000003 HC RX IP 258 OP 636: Performed by: SURGERY

## 2023-01-08 PROCEDURE — 120N000002 HC R&B MED SURG/OB UMMC

## 2023-01-08 PROCEDURE — 250N000013 HC RX MED GY IP 250 OP 250 PS 637: Performed by: SURGERY

## 2023-01-08 PROCEDURE — 250N000013 HC RX MED GY IP 250 OP 250 PS 637

## 2023-01-08 RX ORDER — ACETAMINOPHEN 325 MG/10.15ML
650 LIQUID ORAL EVERY 6 HOURS PRN
Qty: 473 ML | Refills: 0 | Status: SHIPPED | OUTPATIENT
Start: 2023-01-08 | End: 2023-01-18

## 2023-01-08 RX ORDER — OMEPRAZOLE
40 KIT
Qty: 300 ML | Refills: 1 | Status: SHIPPED | OUTPATIENT
Start: 2023-01-09 | End: 2023-01-19

## 2023-01-08 RX ORDER — LIDOCAINE 4 G/G
1 PATCH TOPICAL EVERY 24 HOURS
Qty: 15 PATCH | Refills: 0 | Status: SHIPPED | OUTPATIENT
Start: 2023-01-08 | End: 2023-05-09

## 2023-01-08 RX ORDER — FAMOTIDINE 40 MG/5ML
20 POWDER, FOR SUSPENSION ORAL DAILY
Qty: 50 ML | Refills: 1 | Status: SHIPPED | OUTPATIENT
Start: 2023-01-09 | End: 2023-01-19

## 2023-01-08 RX ADMIN — HYDROMORPHONE HYDROCHLORIDE 0.2 MG: 0.2 INJECTION, SOLUTION INTRAMUSCULAR; INTRAVENOUS; SUBCUTANEOUS at 13:02

## 2023-01-08 RX ADMIN — HYDROMORPHONE HYDROCHLORIDE 0.2 MG: 0.2 INJECTION, SOLUTION INTRAMUSCULAR; INTRAVENOUS; SUBCUTANEOUS at 05:38

## 2023-01-08 RX ADMIN — ACETAMINOPHEN 650 MG: 325 SUSPENSION ORAL at 17:27

## 2023-01-08 RX ADMIN — SENNOSIDES 5 ML: 8.8 LIQUID ORAL at 20:15

## 2023-01-08 RX ADMIN — CAFFEINE CITRATE 320 MG: 20 SOLUTION ORAL at 09:03

## 2023-01-08 RX ADMIN — ENOXAPARIN SODIUM 40 MG: 40 INJECTION SUBCUTANEOUS at 13:02

## 2023-01-08 RX ADMIN — OXYCODONE HYDROCHLORIDE 10 MG: 5 SOLUTION ORAL at 10:24

## 2023-01-08 RX ADMIN — FAMOTIDINE 20 MG: 40 POWDER, FOR SUSPENSION ORAL at 09:03

## 2023-01-08 RX ADMIN — OMEPRAZOLE 40 MG: KIT at 20:15

## 2023-01-08 RX ADMIN — METHOCARBAMOL 500 MG: 100 INJECTION, SOLUTION INTRAMUSCULAR; INTRAVENOUS at 09:16

## 2023-01-08 RX ADMIN — PROMETHAZINE HYDROCHLORIDE 25 MG: 25 INJECTION INTRAMUSCULAR; INTRAVENOUS at 08:54

## 2023-01-08 RX ADMIN — LIDOCAINE PATCH 4% 1 PATCH: 40 PATCH TOPICAL at 20:14

## 2023-01-08 RX ADMIN — PROCHLORPERAZINE EDISYLATE 10 MG: 5 INJECTION INTRAMUSCULAR; INTRAVENOUS at 05:37

## 2023-01-08 RX ADMIN — SENNOSIDES 5 ML: 8.8 LIQUID ORAL at 09:02

## 2023-01-08 RX ADMIN — LEVOTHYROXINE SODIUM 137 MCG: 100 SOLUTION ORAL at 09:02

## 2023-01-08 RX ADMIN — OXYCODONE HYDROCHLORIDE 10 MG: 5 SOLUTION ORAL at 20:15

## 2023-01-08 RX ADMIN — SODIUM CHLORIDE, POTASSIUM CHLORIDE, SODIUM LACTATE AND CALCIUM CHLORIDE 1000 ML: 600; 310; 30; 20 INJECTION, SOLUTION INTRAVENOUS at 10:30

## 2023-01-08 RX ADMIN — METHOCARBAMOL 500 MG: 100 INJECTION, SOLUTION INTRAMUSCULAR; INTRAVENOUS at 17:27

## 2023-01-08 RX ADMIN — OXYCODONE HYDROCHLORIDE 10 MG: 5 SOLUTION ORAL at 14:42

## 2023-01-08 RX ADMIN — PROCHLORPERAZINE EDISYLATE 10 MG: 5 INJECTION INTRAMUSCULAR; INTRAVENOUS at 20:15

## 2023-01-08 ASSESSMENT — ACTIVITIES OF DAILY LIVING (ADL)
ADLS_ACUITY_SCORE: 30

## 2023-01-08 NOTE — PROGRESS NOTES
Surgery Progress Note  01/08/2023       Subjective:  - NAOE  - patient says she slept well overnight. Had nausea that lasted until 2 pm yesterday. Says phenergan helped her, and had replaced scopolamine patch  - tube has not been clamped yet  - abdominal discomfort about the same, still sore in LLQ     Objective:  Temp:  [97.4  F (36.3  C)-98.3  F (36.8  C)] 98.3  F (36.8  C)  Pulse:  [84-99] 99  Resp:  [16] 16  BP: (109-124)/(54-68) 120/67  SpO2:  [95 %-98 %] 95 %    I/O last 3 completed shifts:  In: 60 [NG/GT:60]  Out: 225 [Emesis/NG output:225]      General Appearance: Sitting up in bed. NAD.  HEENT: sclera non-injected and non-icteric  Respiratory: Breathing comfortably on room air. No increased work of breathing  Cardiovascular: Regular rate   GI: abdomen soft and non-distended.  tenderness to palpation in the LLQ. G tube to gravity  Neuro: alert and oriented       Labs:  Recent Labs   Lab 01/04/23  0848 01/03/23  1013   WBC 6.8 6.7   HGB 12.5 12.7    337       Recent Labs   Lab 01/08/23  0802 01/07/23  1508 01/07/23  1104 01/06/23  1140 01/06/23  0817 01/05/23  1801 01/05/23  0645 01/04/23  1810 01/04/23  0848 01/03/23  1751 01/03/23  1013   NA  --   --   --   --   --   --  138  --  138  --  140   POTASSIUM  --   --   --   --   --   --  4.2  --  4.1  --  4.0   CHLORIDE  --   --   --   --   --   --  100  --  100  --  99   CO2  --   --   --   --   --   --  26  --  25  --  25   BUN  --   --   --   --   --   --  8.2  --  7.9  --  6.0   CR  --   --   --   --   --   --  0.57  --  0.53  --  0.52   * 158* 122*   < >  --    < > 124*   < > 127*   < > 127*   KASSI  --   --   --   --   --   --  9.4  --  8.8  --  9.2   MAG  --   --   --   --  2.0  --  1.9  --  1.8  --  1.8   PHOS  --   --   --   --   --   --  4.3  --  3.9  --  3.2    < > = values in this interval not displayed.       Imaging:  No new imaging.      Assessment/Plan:     57 year old female with h/o migraine, chronic pancreatitis S/p  TPIAT,splenectomy, gastrojejunostomy, R&Y, c/b gastroparesis, malnutrition. Patient underwent Ex-Lap, BAUDILIO, SBR, and J - feeding tube placement on 12/30. Progressing well post operatively. Had some leakage from J tube with fast medication pushes, improved when administered slower. Pain controlled. Nausea improved.      Neuro:  Robaxin 500mg, Q6hr PRN solution through J-tube (Pharmicist Geo Mason confirmed I.V robaxin can be given through J-Tube )                Dilaudid 0.2 IV Q 4hr PRN               Oxy 5-10mg solution Q4hrs, PRN (through J-tube)               J site Lidocaine patch               PTA caffeine citrate syrup through J- feeding tube                PTA Zomig-ZMT      CV: Vitals Q8hrs      PULM: IS, ambulate QID      GI/FEN:  - Phenergan 25 mg IV daily                 - Clamp the Gastric tube (RUQ),  if patient nauseous/distended/feeling full at anytime, un -clamp and set back to gravity prn                 - Sennosides (Through the J-tube (LUQ). Please go very slow when giving meds through the J-tube                  - TFs @ 30 ml/hr. Don't advance without MD order                 - Give 1 L of LR over 4 hours                  - Cont PPI solution through J-Tube (LUQ)                 - Scopolomine patch, IV phenergan     Endocrine: - Appreciate endocrine team recs for glucose level management                     - Continue Levothyroxine through J-Tube      Lines: Cont PICC line care per protocol      Drains:  Push medications through the J-tube slowly                Gastric tube in the RUQ for gravity (Nothing per this tube)                 J- Tube in the LUQ for feeding and meds                  Wound: Open to air. Staples in place.    Activity: OOB, ambulate as tolerated   DVT Ppx: Lovenox 40 mg Q24hrs    Dispo: likely tomorrow following clamp trial     Seen, examined, and discussed with chief resident, who will discuss with staff.  - - - - - - - - - - - - - - - - - -  MD Parker Khan  Family Medicine Resident, PGY-2  Text page on call surgery resident via [facility] Paging/Directory - [facility]  /[facility]

## 2023-01-08 NOTE — PLAN OF CARE
Goal Outcome Evaluation:      Plan of Care Reviewed With: patient    Overall Patient Progress: improvingOverall Patient Progress: improving     [id number]  Vitals: VSS on RA  Neuros: A&Ox4  IV: Port tko between meds.   Diet: NPO. TF @30ml/hr  Bowel status: G tube clamped from 9am to 6pm, started c/o reflux and bloating so put back to gravity drainage. Scant amount out immediately after connecting to bag. J tube for TF and meds. Passing gas but no BM today.   : voiding without issues  Skin: MLI without drainage. G and J tubes. Bruises from lovenox injections  Pain: abd pain controlled with 0.2mg IV dilaudid x1, oxycodone 10mg x2, robaxin x2, tylenol x1.   Activity: independent   Social: family visited today  Plan: possible discharge tomorrow

## 2023-01-08 NOTE — PROGRESS NOTES
"/54 (BP Location: Right arm)   Pulse 84   Temp 97.4  F (36.3  C) (Axillary)   Resp 16   Ht 1.702 m (5' 7\")   Wt 63.8 kg (140 lb 11.2 oz)   SpO2 98%   BMI 22.04 kg/m       Neuro: AOx4, cooperative with cares  Cardiac: WDL  Respiratory: on RA, WDL  GI/: continent of B&B, passing gas +BS. Voiding AUOP   Diet/Appetite: NPO on TF running at 30 mL/hr & 30 ml/hr free water q4hr. Compazine given for nausea   Skin: midline incision JACKIE w/staples  LDA: G-tube to gravity bag, J-tube incision site dressing change x2  Activity: SBA  Pain: abd pain being managed with IV dilaudid & Oxycodone  Plan: continue POC   "

## 2023-01-09 VITALS
WEIGHT: 140.7 LBS | TEMPERATURE: 98.7 F | SYSTOLIC BLOOD PRESSURE: 116 MMHG | BODY MASS INDEX: 22.08 KG/M2 | OXYGEN SATURATION: 94 % | HEIGHT: 67 IN | RESPIRATION RATE: 18 BRPM | HEART RATE: 88 BPM | DIASTOLIC BLOOD PRESSURE: 66 MMHG

## 2023-01-09 LAB — MAGNESIUM SERPL-MCNC: 1.7 MG/DL (ref 1.7–2.3)

## 2023-01-09 PROCEDURE — 250N000011 HC RX IP 250 OP 636: Performed by: SURGERY

## 2023-01-09 PROCEDURE — 250N000011 HC RX IP 250 OP 636

## 2023-01-09 PROCEDURE — 36591 DRAW BLOOD OFF VENOUS DEVICE: CPT | Performed by: SURGERY

## 2023-01-09 PROCEDURE — 83735 ASSAY OF MAGNESIUM: CPT | Performed by: SURGERY

## 2023-01-09 PROCEDURE — 258N000003 HC RX IP 258 OP 636

## 2023-01-09 PROCEDURE — 258N000003 HC RX IP 258 OP 636: Performed by: SURGERY

## 2023-01-09 PROCEDURE — 250N000013 HC RX MED GY IP 250 OP 250 PS 637

## 2023-01-09 PROCEDURE — 250N000013 HC RX MED GY IP 250 OP 250 PS 637: Performed by: SURGERY

## 2023-01-09 RX ORDER — METHOCARBAMOL 500 MG/1
500 TABLET, FILM COATED ORAL 4 TIMES DAILY PRN
Qty: 50 TABLET | Refills: 0 | Status: SHIPPED | OUTPATIENT
Start: 2023-01-09 | End: 2023-02-28

## 2023-01-09 RX ORDER — OXYCODONE HCL 5 MG/5 ML
5-10 SOLUTION, ORAL ORAL EVERY 4 HOURS PRN
Qty: 100 ML | Refills: 0 | Status: ON HOLD | OUTPATIENT
Start: 2023-01-09 | End: 2023-02-02

## 2023-01-09 RX ORDER — CAFFEINE CITRATE 20 MG/ML
5 SOLUTION ORAL DAILY
Qty: 500 ML | Refills: 0 | Status: ON HOLD | OUTPATIENT
Start: 2023-01-09 | End: 2023-02-02

## 2023-01-09 RX ORDER — HYDROMORPHONE HCL IN WATER/PF 6 MG/30 ML
0.2 PATIENT CONTROLLED ANALGESIA SYRINGE INTRAVENOUS EVERY 8 HOURS PRN
Status: DISCONTINUED | OUTPATIENT
Start: 2023-01-09 | End: 2023-01-09

## 2023-01-09 RX ADMIN — CAFFEINE CITRATE 320 MG: 20 SOLUTION ORAL at 09:21

## 2023-01-09 RX ADMIN — LEVOTHYROXINE SODIUM 137 MCG: 100 SOLUTION ORAL at 09:21

## 2023-01-09 RX ADMIN — SENNOSIDES 10 ML: 8.8 LIQUID ORAL at 09:21

## 2023-01-09 RX ADMIN — OXYCODONE HYDROCHLORIDE 10 MG: 5 SOLUTION ORAL at 09:11

## 2023-01-09 RX ADMIN — SODIUM CHLORIDE, POTASSIUM CHLORIDE, SODIUM LACTATE AND CALCIUM CHLORIDE 1000 ML: 600; 310; 30; 20 INJECTION, SOLUTION INTRAVENOUS at 09:21

## 2023-01-09 RX ADMIN — PROCHLORPERAZINE EDISYLATE 10 MG: 5 INJECTION INTRAMUSCULAR; INTRAVENOUS at 01:41

## 2023-01-09 RX ADMIN — OXYCODONE HYDROCHLORIDE 10 MG: 5 SOLUTION ORAL at 12:33

## 2023-01-09 RX ADMIN — FAMOTIDINE 20 MG: 40 POWDER, FOR SUSPENSION ORAL at 09:21

## 2023-01-09 RX ADMIN — PROMETHAZINE HYDROCHLORIDE 25 MG: 25 INJECTION INTRAMUSCULAR; INTRAVENOUS at 10:09

## 2023-01-09 RX ADMIN — HYDROMORPHONE HYDROCHLORIDE 0.2 MG: 0.2 INJECTION, SOLUTION INTRAMUSCULAR; INTRAVENOUS; SUBCUTANEOUS at 01:41

## 2023-01-09 ASSESSMENT — ACTIVITIES OF DAILY LIVING (ADL)
ADLS_ACUITY_SCORE: 30

## 2023-01-09 NOTE — PROGRESS NOTES
Care Management Discharge Note    Discharge Date: 01/09/2023       Discharge Disposition: Home with services    Discharge Services:Home Care, Home Infusion       Discharge DME:No new needs    Discharge Transportation: family or friend will provide    Private pay costs discussed: Not applicable    PAS Confirmation Code:NA    Patient/family educated on Medicare website which has current facility and service quality ratings:NA     Education Provided on the Discharge Plan: yes  Persons Notified of Discharge Plans: patient  Patient/Family in Agreement with the Plan: yes     Handoff Referral Completed: external    Additional Information:  Received update from MD team that pt is medically ready for discharge to home today.  Updated [facility] and [facility].  Family to provide transportation to home.  RNCC will remain available if further needs arise.     [facility](TF, IV hydration)  Phone: [phone]  Fax: [phone]      [facility]  Address: [facility] [address], [place] [id number]  Phone: [phone]  Appointments: [facility]    Shannan Barnett, RNCC  Phone: [phone]  Pager: [phone]    SEARCHABLE in [facility] - search CARE COORDINATOR     [place] [address] [place] ([phone]) Saturday & Sunday; ([phone]) [facility] Recognized Holidays     Units: 4A, 4C, 4E, 5A & 5B   Pager: [phone]    Units: 6A & 6B    Pager: [phone]    Units: 6C & 6D   Pager: [phone]    Units: 7A, 7B, 7C, 7D & 5C    Pager: [phone]    Units: [facility] ED, 5 Ortho, 5 Med/Surg, 6 Med/Surg, 8A, 10 ICU, & [facility]    Pager: [phone]

## 2023-01-09 NOTE — DISCHARGE SUMMARY
[facility]  Discharge Summary  General Surgery     Dinora Mcghee MRN# [id number]   YOB: 1965 Age: 57 year old     Date of Admission:  12/30/2022  Date of Discharge::  1/9/2023 12:39 PM  Admitting Physician:  Elver Bauer MD  Discharge Physician:  Elver Bauer MD  Primary Care Physician:        Rossi Andrade          Admission Diagnoses:   Gastroparesis [K31.84]  Feeding intolerance [R63.39]  Severe malnutrition  Small bowel obstruction         Discharge Diagnosis:   Same         Procedures:   Procedure(s):  12/30 Exploratory Laparotomy, lysis of adhesions, small bowel resection. Placement of gastric jejunostomy for enteral feeding.          Consultations:   OCCUPATIONAL THERAPY ADULT IP CONSULT  PHYSICAL THERAPY ADULT IP CONSULT  ENDOCRINE DIABETES ADULT IP CONSULT  CARE MANAGEMENT / SOCIAL WORK IP CONSULT          Brief History of Illness:   Dinora Mcghee is a 57 year old female who has previous TPAIT she has developed feeding intolerance and difficulties with gastroparesis. She has had complications related to prior endoscopic jejunostomy tube. She presents for surgical jejunstomy tube placement.           Hospital Course:   The patient underwent the above stated procedure on 12/30/22, which she tolerated well without immediate complications. She was transferred to the PACU and then floor for routine postoperative care. The remainder of her postoperative course was uncomplicated. Endocrinology was consulted to assist with post-operative glucose management. Tube feeds were restarted; she did have occasional nausea and bloating, which are chronic issues for her, managed with venting G tube as needed. She had slow return of bowel function. On the day of discharge, she was tolerating tube feeds, her pain was well controlled without IV pain medications, she was voiding spontaneously, having bowel function, and ambulating independently. Patient with  follow up in general surgery clinic in 1-2 weeks.            Imaging Studies:     Results for orders placed or performed in visit on 12/10/22   CT Abdomen Pelvis w Contrast    Narrative    Examination: CT ABDOMEN PELVIS W CONTRAST, 12/10/2022 11:54 AM    Technique:  Helical CT images from the lung bases through the  symphysis pubis were obtained with contrast.  Coronal reformatted  images were generated at a workstation for further assessment.    Comparison: 12/5/2021 and 12/3/2021 CT abdomen/pelvis    History: pt with gastroparesis and exchange of GJ tube yesterday.   With abdominal pain and J feeding tube coming out of vented G. Concern  for feeding tube flipped into stomach  vs gastric outlet obstruction.;  Gastroparesis    Findings:    CHEST:    Bibasilar atelectasis. Heart size is normal. Mild degree of  interlobular septal thickening in the lung bases. Calcification in the  left lower lobe (series 4, image 56).    ABDOMEN AND PELVIS:     Liver: Wedge-shaped area of contrast enhancement within hepatic  segment 4 (series 3, image 98), favor to represent a vascular shunt or  regional perfusional difference. Additional area of hyperenhancement  in the peripheral segment 4A/8 (series 3, image 96)  Stomach: Percutaneous gastrostomy bulb was within the gastric lumen.  The G-tube terminates in the first portion of the duodenum.  Gallbladder: Surgically absent.  Biliary Tree: No biliary dilatation.  Spleen: Not visualized.  Pancreas: Postsurgical changes of pancreatectomy. 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  Adrenal glands: No adrenal nodules.  Urinary system: No kidney masses. No hydronephrosis, hydroureter, or  obstructing renal stones. Urinary bladder is unremarkable.  Bowel: No abnormally dilated loops of large or small bowel. No  abnormal bowel wall thickening or enhancement. The appendix is not  well visualized. Lymph nodes: No retroperitoneal, mesenteric, or  pelvic lymphadenopathy. Left  lower quadrant jejunostomy tube has been  removed.  Fluid: No free fluid within the abdomen.  Vessels: No infrarenal aortic aneurysm. The portal, superior  mesenteric, and splenic veins are patent. Incidental left-sided  retroaortic renal vein.    BONES AND SOFT TISSUES: Stable size of sclerotic focus in the L2  vertebral body they abuts the inferior endplate (series 5, image 68).      Impression    Impression:   1. Percutaneous gastrostomy tube is coiled back in the stomach with  tip near the antrum/first part of duodenum.  No pneumoperitoneum.  2. Heterogenous enhancement of the liver with observations of of  peripheral hyper enhancement, indeterminate, may may represent  perfusional /vascular phenomenon, MRI of the liver may be considered  for further characterization if clinically indicated    Finding #1 discussed with Dr. Park by Evelia at 12:45 PM 12/10/2022  12:55 PM    I have personally reviewed the examination and initial interpretation  and I agree with the findings.    CATIE JOHNSON MD         SYSTEM ID:  [id number]     *Note: Due to a large number of results and/or encounters for the requested time period, some results have not been displayed. A complete set of results can be found in Results Review.              Medications Prior to Admission:     No medications prior to admission.              Discharge Medications:     Discharge Medication List as of 1/9/2023 12:08 PM      START taking these medications    Details   acetaminophen (TYLENOL) 325 MG/10.15ML solution 20.3 mLs (650 mg) by Per J Tube route every 6 hours as needed for mild pain or fever, Disp-473 mL, R-0, E-Prescribe      caffeine citrate (CAFCIT) 20 MG/ML solution 16 mLs (320 mg) by Per J Tube route daily, Disp-500 mL, R-0, E-Prescribe      famotidine (PEPCID) 40 MG/5ML suspension 2.5 mLs (20 mg) by Per J Tube route daily, Disp-50 mL, R-1, E-Prescribe      levothyroxine 20 mcg/mL (THYQUIDITY) 20 mcg/mL SOLN oral solution 6.85 mLs (137  mcg) by Per J Tube route daily, Disp-100 mL, R-1, E-Prescribe      !! methocarbamol (ROBAXIN) 500 MG tablet 1 tablet (500 mg) by Per Feeding Tube route 4 times daily as needed for muscle spasms, Disp-50 tablet, R-0, E-Prescribe      omeprazole (FIRST-OMEPRAZOLE) 2 MG/ML SUSP 20 mLs (40 mg) by Per J Tube route every morning (before breakfast), Disp-300 mL, R-1, E-Prescribe      oxyCODONE (ROXICODONE) 5 MG/5ML solution 5-10 mLs (5-10 mg) by Per J Tube route every 4 hours as needed for moderate pain (4-6), Disp-100 mL, R-0, Local Print      promethazine (PHENERGAN) 6.25 MG/5ML syrup 20 mLs (25 mg) by Per Feeding Tube route nightly as needed for nausea, Disp-240 mL, R-0, E-Prescribe      sennosides (SENOKOT) 8.8 MG/5ML syrup 5 mLs by Per J Tube route 2 times daily, Disp-236 mL, R-0, E-Prescribe       !! - Potential duplicate medications found. Please discuss with provider.      CONTINUE these medications which have CHANGED    Details   Lidocaine (LIDOCARE) 4 % Patch Place 1 patch onto the skin every 24 hours Apply patch to abdomen once daily as needed. Remove after 12 hours. To prevent lidocaine toxicity, should be patch free for 12 hrs daily.Disp-15 patch, [id number]         CONTINUE these medications which have NOT CHANGED    Details   acarbose (PRECOSE) 100 MG tablet Take 1 tablet (100 mg) by mouth 3 times daily (with meals), Disp-90 tablet, R-3, E-Prescribe      amylase-lipase-protease (CREON) 12905-16852 units CPEP per EC capsule Take 3-4 capsules by mouth 3 times daily (with meals) With oral meals, Disp-150 capsule, R-11, E-Prescribe      blood glucose (PAT CONTOUR NEXT) test strip Use to test blood sugar 6 times daily or as directed., Disp-2 Box, R-11, E-Prescribe      blood glucose monitoring (PAT MICROLET) lancets Use to test blood sugar 6-8 times daily or as directed., Disp-100 each, R-11, E-Prescribe      Continuous Blood Gluc Sensor (FREESTYLE LISA 2 SENSOR) MISC Inject 1 patch into the skin every  "14 days, Disp-2 each, R-11, E-PrescribePlease call Lidya Gaffney RNCC with questions. [phone] Fax       cyclobenzaprine (FLEXERIL) 10 MG tablet Take 1 tablet (10 mg) by mouth 2 times daily as needed for muscle spasms, Disp-90 tablet, R-3, E-Prescribe      diphenhydrAMINE (BENADRYL ALLERGY) 25 MG capsule Take 1 capsule (25 mg) by mouth every 6 hours as needed for itching or allergies, Disp-56 capsule, R-0, No Print Out      estradiol (VAGIFEM) 10 MCG TABS vaginal tablet INSERT 1 TABLET(10 MCG) VAGINALLY 2 TIMES A WEEK, Disp-24 tablet, R-2, E-Prescribe      glucagon 1 MG kit Give 0.1 to 0.15mg( 10-15 units on Insulin sryinge) subcutaneous  every 15 minutes PRN for hypoglycemia. Remix new kit q24hr. Needs up to 3 kit/week., Disp-10 each, R-3, E-PrescribeCall Lidya Gaffney Rn @ [phone] with questions.      glucose 40 % GEL gel Take 15-30 g by mouth every 15 minutes as needed for low blood sugarDisp-3 Tube, [id number]      Hydroactive Dressings (TEGADERM HYDROCOLLOID THIN) MISC Use under sensor site , change every 14 days, Disp-2 each, R-11, E-PrescribeCall Lidya Gaffney RNCC  with any questions      ibuprofen (ADVIL/MOTRIN) 400 MG tablet Take 1 tablet (400 mg) by mouth every 6 hours as needed for moderate pain, Disp-30 tablet, R-0, E-Prescribe      insulin syringe-needle U-100 (30G X 1/2\" 0.3 ML) 30G X 1/2\" 0.3 ML miscellaneous Use 3 syringes daily or as directed.Disp-50 each, [id number]      !! methocarbamol (ROBAXIN) 750 MG tablet Take 1 tablet (750 mg) by mouth 4 times daily, Disp-120 tablet, R-0, E-Prescribe      Multiple Vitamin (MULTIVITAMIN ADULT PO) Take 1 tablet by mouth daily, Historical      Nutritional Supplements (BOOST HIGH PROTEIN) LIQD After above baseline labs are drawn, give: 6 mL/kg to maximum of 360 mL; the beverage is to be consumed within 5 minutes., R-0, No Print OutIf on pancreatic enzymes, patient may take home enzymes with Boost beverage.   Patients may " take long acting insu lata (Levemir and Lantus).   Patient should NOT cover Boost with Novolog, Humalog, Apidra, or regular insulin.      order for DME Equipment being ordered:CefalyDisp-1 Device, R-0, Local Print      prochlorperazine (COMPAZINE) 10 MG tablet TAKE 1/2 TABLET(5 MG) BY MOUTH EVERY 6 HOURS AS NEEDED FOR NAUSEA OR VOMITING, Disp-60 tablet, R-2, E-Prescribe      promethazine (PHENERGAN) 25 MG tablet Take 25 mg by mouth At Bedtime, Historical      Prucalopride Succinate 2 MG TABS Take 1 tablet (2 mg) by mouth daily, Disp-30 tablet, R-11, E-Prescribe      scopolamine (TRANSDERM) 1 MG/3DAYS 72 hr patch Place 1 patch onto the skin every 72 hours, Disp-10 patch, R-11, E-Prescribe      Sharps Container (BD SHARPS ) MISC 1 Container as needed, Disp-1 each, R-1, E-Prescribe      STATIN NOT PRESCRIBED (INTENTIONAL) Historical      ZOLMitriptan (ZOMIG-ZMT) 5 MG ODT Take 1 tablet (5 mg) by mouth at onset of headache for migraine May repeat in 2 hours. Max 2 tablets/24 hours., Disp-18 tablet, R-6, E-Prescribe       !! - Potential duplicate medications found. Please discuss with provider.      STOP taking these medications       azelastine (ASTELIN) 0.1 % nasal spray Comments:   Reason for Stopping:         cetirizine (ZYRTEC) 10 MG tablet Comments:   Reason for Stopping:         famotidine (PEPCID) 20 MG tablet Comments:   Reason for Stopping:         HYDROmorphone, STANDARD CONC, (DILAUDID) 1 MG/ML oral solution Comments:   Reason for Stopping:         levothyroxine (SYNTHROID/LEVOTHROID) 137 MCG tablet Comments:   Reason for Stopping:         montelukast (SINGULAIR) 10 MG tablet Comments:   Reason for Stopping:         omeprazole (PRILOSEC) 40 MG DR capsule Comments:   Reason for Stopping:                       Medications Discontinued or Adjusted During This Hospitalization:   No change           Antibiotics Prescribed at Discharge:   None prescribed           Day of Discharge Physical Exam:   Temp:   [48.2  F (9  C)-98.7  F (37.1  C)] 98.7  F (37.1  C)  Pulse:  [88] 88  Resp:  [18] 18  BP: (116-123)/(66-72) 116/66  SpO2:  [94 %-100 %] 94 %    General: awake, alert, no acute distress, appears comfortable in bed   CV: Regular rate. warm, well perfused   Pulm: breathing non-labored on room air   Abdomen: soft, non-distended, appropriately tender, no rebound or guarding;  G tube to gravity bag with gastric contents in bag.    Extremities: no edema, moving all extremities spontaneously and without apparent deficit            Final Pathology Result:     Surgical Pathology Report                         Case: [id number]                                   Authorizing Provider:  Elver Bauer,   Collected:           12/30/2022 03:03 PM                                  MD                                                                            Ordering Location:     [facility] MAIN OR                 Received:            12/30/2022 04:52 PM           Pathologist:           Tawanna Dahl MD                                                              Specimen:    Small Intestine, Proximal Small Bowel                                                      Final Diagnosis   A(1). PROXIMAL SMALL BOWEL, RESECTION:  -Small bowel with serosal fibrous adhesions                Discharge Instructions and Follow-Up:     Discharge Procedure Orders   Home Care Referral   Referral Priority: Routine: Next available opening Referral Type: Home Health Therapies & Aides   Number of Visits Requested: 1     Home Infusion Referral   Referral Priority: Routine: Next available opening Referral Type: Consultation   Number of Visits Requested: 1     Reason for your hospital stay   Order Comments: Ex lap, BAUDILIO, SBR, J tube placement     Activity   Order Comments: Your activity upon discharge: Do not lift greater than 15 lb or participate in any strenuous activity for at least 6 weeks after surgery.     Order Specific Question Answer Comments   Is  discharge order? Yes      Wound care and dressings   Order Comments: Midline abdominal incision: Okay to leave open to air. Okay to get incision wet in the shower- let soap and water run over incision, do not scrub.   Staples will be removed at clinic follow up.     Tubes and drains   Order Comments: You are going home with the following tubes or drains: G tube, J tube    G TUBE: For as needed venting  -You should vent or temporarily put your tube to gravity bag (or basin) drainage if you experience nausea or bloating.     JTUBE: For tube feedings    Skin care:  -Change the gauze dressing around your tube daily.  -Check for redness and swelling in the area where the tube goes into your skin.    -Keep the skin around your tube dry and with a split gauze under the flange. If the hole where the tube enters your body is draining fluid, you may need to put barrier cream or antibiotic cream on the skin around your tube after you are done cleaning it. Cover the area with bandages, and call your caregiver.   -If there are no stitches holding your tube in place on your skin, gently turn the tube. This may decrease pressure on your skin, and help prevent an infection. Ask your caregiver if you should turn your tube, and how to do it correctly.     Flushes:  -Flush both the G and J tube ports with 30cc of water twice daily to ensure they do not become plugged  -After medications or tube feedings, make sure to flush with water immediately after use to prevent the tube from plugging     Adult [facility] Follow-up and recommended labs and tests   Order Comments: Follow up with general surgery on 1/12 for post-op follow up and staple removal  Follow up with primary care provider at your earliest convenience to follow up inpatient stay.     Appointments on [place] and/or [facility] (with [facility] or [facility] provider or service). Call [phone] if you haven't heard regarding these appointments within 7 days of discharge.     Diet    Order Comments: Follow this diet upon discharge: Orders Placed This Encounter      Adult Formula Drip Feeding: Continuous Vital 1.5; Jejunostomy; Goal Rate: (1/6): 30 ml/hr ( no advancement rate. Further advancement per MD); mL/hr; No advancement      NPO for Medical/Clinical Reasons Except for: Ice Chips     Order Specific Question Answer Comments   Is discharge order? Yes               Home Health Care:     Not needed           Discharge Disposition:     Discharged to home      Condition at discharge: Stable    Patient was seen, examined, and discussed on day of discharge with chief resident Dr. Rubi, who discussed with staff surgeon Dr. Bauer.       - - - - - - - - - - - - - - - - - -  Sweta Gautam MD  [facility] General Surgery PGY-2  01/09/2023    See [facility] for on-call pager information.

## 2023-01-09 NOTE — PROGRESS NOTES
"/72 (BP Location: Right arm)   Pulse 88   Temp 98.3  F (36.8  C) (Oral)   Resp 18   Ht 1.702 m (5' 7\")   Wt 63.8 kg (140 lb 11.2 oz)   SpO2 100%   BMI 22.04 kg/m       Cardiac: WDL  Respiratory: on RA, WDL  GI/: continent of B&B, passing gas +BS. Voiding AUOP   Diet/Appetite: NPO on TF running at 30 mL/hr & 30 ml/hr free water q4hr. Compazine given for nausea   Skin: abd midline incision JACKIE w/staples  LDA: G-tube to gravity bag, J-tube incision site dressing change x2  Activity: SBA  Pain: abd pain being managed with IV dilaudid & Oxycodone  Plan: continue POC           "

## 2023-01-09 NOTE — PROGRESS NOTES
CLINICAL NUTRITION SERVICES - BRIEF NOTE     Nutrition Prescription    RECOMMENDATIONS FOR MDs/PROVIDERS TO ORDER:  - If TF rate to remain @ 30 ml/hr with minimal po intake, would recommend adding Prosource TF20--1 pkt BID.  With this will provide 1240 kcal (19 ml/kg), 89 gm protein (1.4 gm Pro/kg).      Recommendations already ordered by Registered Dietitian (RD):  - none at present.   - Per general surgery, pt to continue with Vital 1.5 @ 30 ml/hr for now.   - Collaborate with other providers--textpage to gen surg resident re: above.   - Collaborate with other nutrition providers--touch base with I RD re: TF not yet at home.        EVALUATION OF THE PROGRESS TOWARD GOALS   Diet: NPO  Nutrition Support:  Vital 1.5 up to 30 ml/hr today.  Current rate of 30 ml/hr provides: 720 ml TF volume, 1080 kcal (17 kcal/kg), 49 gm protein (0.8 gm Pro/kg), 135 g CHO, 550 ml free water  FWF: 30 ml/hr q 4 hr.   NEW FINDINGS   Blood sugars range--<180 with TF @ 30 ml/hr.   Noted plan for pt to continue with IVF q day --1 L LR.     INTERVENTIONS  See above recommendations.     Monitoring/Evaluation  Progress toward goals will be monitored and evaluated per protocol.     Zoë Plascencia RD, LD   7B (M-F) Pager: [phone]  RD Weekend/Holiday Pager: [phone]

## 2023-01-09 NOTE — PLAN OF CARE
Pt discharged from unit [facility] on 1/9/2023 at 1047 to home with home infusion with family. All discharge instructions were given to the patient, medication orders were sent to discharge pharmacy, and all belongings remained with the patient.     Patient received a port dressing and needle change on 1/9/2023 by writer prior to discharge.

## 2023-01-09 NOTE — PROVIDER NOTIFICATION
Purpose of Notification: discharge planning  Notified Person: gen surg  Notification Time: 1022  Notification Interaction:page  7B [id number] SAMMY'Golden  Pt was wondering if we can try 40ml/hr on feeds to see if she tolerates it before discharge  Nory Castaneda RN

## 2023-01-09 NOTE — PLAN OF CARE
"/70 (BP Location: Right arm)   Pulse 87   Temp 98.6  F (37  C) (Oral)   Resp 17   Ht 1.702 m (5' 7\")   Wt 63.8 kg (140 lb 11.2 oz)   SpO2 100%   BMI 22.04 kg/m      Activity:  SBA  Pain: c/o of abd pain and bloating, G-tube still on gravity, oxycodone x1, c/o nausea- compazine x1,   Neuro: A&O x4, able to make needs known   Cardiac: WDL denies chest pain   Respiratory: On RA, denies SOB  GI/: Voids spontaneously, No BM this shift, +BS   Diet: NPO on TF running at 30 ml/hr % freewater 30 ml/hr q4hr.   Lines:  R chest port infusing TKO.   Incisions/Drains/Skin: G-tube to gravity bag, J-tube- dressing changed,  Midline incision- JACKIE   Lab:  Reviewed      Plan: Continue with POC          "

## 2023-01-10 ENCOUNTER — MEDICAL CORRESPONDENCE (OUTPATIENT)
Dept: HEALTH INFORMATION MANAGEMENT | Facility: CLINIC | Age: 58
End: 2023-01-10

## 2023-01-11 ENCOUNTER — TELEPHONE (OUTPATIENT)
Dept: TRANSPLANT | Facility: CLINIC | Age: 58
End: 2023-01-11

## 2023-01-11 NOTE — TELEPHONE ENCOUNTER
Called Dinora to see if she is still coming to her appointment tomorrow which she verified.  No further questions or concerns

## 2023-01-12 ENCOUNTER — OFFICE VISIT (OUTPATIENT)
Dept: SURGERY | Facility: CLINIC | Age: 58
End: 2023-01-12
Payer: COMMERCIAL

## 2023-01-12 ENCOUNTER — ANCILLARY PROCEDURE (OUTPATIENT)
Dept: CT IMAGING | Facility: CLINIC | Age: 58
End: 2023-01-12
Attending: SURGERY
Payer: COMMERCIAL

## 2023-01-12 ENCOUNTER — NURSE TRIAGE (OUTPATIENT)
Dept: NURSING | Facility: CLINIC | Age: 58
End: 2023-01-12

## 2023-01-12 ENCOUNTER — HOSPITAL ENCOUNTER (EMERGENCY)
Facility: CLINIC | Age: 58
End: 2023-01-12
Payer: COMMERCIAL

## 2023-01-12 VITALS
BODY MASS INDEX: 21.25 KG/M2 | HEART RATE: 101 BPM | HEIGHT: 67 IN | DIASTOLIC BLOOD PRESSURE: 81 MMHG | OXYGEN SATURATION: 99 % | WEIGHT: 135.4 LBS | SYSTOLIC BLOOD PRESSURE: 120 MMHG

## 2023-01-12 DIAGNOSIS — Z46.59 ENCOUNTER FOR CARE RELATED TO FEEDING TUBE: ICD-10-CM

## 2023-01-12 DIAGNOSIS — K31.84 GASTROPARESIS: ICD-10-CM

## 2023-01-12 DIAGNOSIS — R10.13 ABDOMINAL PAIN, EPIGASTRIC: ICD-10-CM

## 2023-01-12 DIAGNOSIS — K31.84 GASTROPARESIS: Primary | ICD-10-CM

## 2023-01-12 PROCEDURE — 49451 REPLACE DUOD/JEJ TUBE PERC: CPT | Mod: 58 | Performed by: SURGERY

## 2023-01-12 PROCEDURE — 74150 CT ABDOMEN W/O CONTRAST: CPT | Mod: GC | Performed by: RADIOLOGY

## 2023-01-12 PROCEDURE — 99024 POSTOP FOLLOW-UP VISIT: CPT | Performed by: SURGERY

## 2023-01-12 RX ORDER — OXYCODONE HYDROCHLORIDE 5 MG/1
5 TABLET ORAL ONCE
Status: COMPLETED | OUTPATIENT
Start: 2023-01-12 | End: 2023-01-17

## 2023-01-12 RX ADMIN — DIATRIZOATE MEGLUMINE AND DIATRIZOATE SODIUM 20 ML: 660; 100 SOLUTION ORAL; RECTAL at 09:29

## 2023-01-12 ASSESSMENT — ENCOUNTER SYMPTOMS
NAUSEA: 1
BLOATING: 1
NAIL CHANGES: 0
HEARTBURN: 1
POOR WOUND HEALING: 0
BLOOD IN STOOL: 0
RECTAL PAIN: 0
BOWEL INCONTINENCE: 0
CONSTIPATION: 1
JAUNDICE: 0
DIARRHEA: 1
SKIN CHANGES: 0
VOMITING: 1
ABDOMINAL PAIN: 1

## 2023-01-12 ASSESSMENT — PAIN SCALES - GENERAL: PAINLEVEL: EXTREME PAIN (8)

## 2023-01-12 NOTE — PROGRESS NOTES
Answers for HPI/ROS submitted by the patient on 1/12/2023  General Symptoms: No  Skin Symptoms: Yes  HENT Symptoms: No  EYE SYMPTOMS: No  HEART SYMPTOMS: No  LUNG SYMPTOMS: No  INTESTINAL SYMPTOMS: Yes  URINARY SYMPTOMS: No  GYNECOLOGIC SYMPTOMS: No  BREAST SYMPTOMS: No  SKELETAL SYMPTOMS: No  BLOOD SYMPTOMS: No  NERVOUS SYSTEM SYMPTOMS: No  MENTAL HEALTH SYMPTOMS: No  Changes in hair: No  Changes in moles/birth marks: No  Itching: No  Rashes: No  Changes in nails: No  Acne: No  Hair in places you don't want it: No  Change in facial hair: No  Warts: No  Non-healing sores: No  Scarring: No  Flaking of skin: No  Color changes of hands/feet in cold : No  Sun sensitivity: No  Skin thickening: No  Heart burn or indigestion: Yes  Nausea: Yes  Vomiting: Yes  Abdominal pain: Yes  Bloating: Yes  Constipation: Yes  Diarrhea: Yes  Blood in stool: No  Black stools: No  Rectal or Anal pain: No  Fecal incontinence: No  Yellowing of skin or eyes: No  Vomit with blood: No  Change in stools: Yes

## 2023-01-12 NOTE — TELEPHONE ENCOUNTER
"  Nurse Triage SBAR    Is this a 2nd Level Triage?    Yes    Situation:   J-Tube fell completely out    Background/Assessment:    Pt's J-Tube came out completely.  Pt is on her way to the ER in [place].  Pt wanted the Team to Know.  Pt has a visit this morning at 10:30 AM with Dr. Bauer.           Pt's # [phone] if needing to call the Pt.       Gina Delgadillo RN  Central Triage Red Flags/Med Refills      Protocol Recommended Disposition:   Go to ED Now      Reason for Disposition    G-tube (or PEG), J-tube, or GJ-tube came completely out of site in abdominal wall    Additional Information    Negative: Shock suspected (e.g., cold/pale/clammy skin, too weak to stand, low BP, rapid pulse)    Negative: [1] Shortness of breath AND [2] new-onset    Negative: Bluish (or gray) lips or face now    Negative: Sounds like a life-threatening emergency to the triager    Negative: SEVERE abdominal pain    Negative: [1] Vomiting AND [2] contains red blood or black (\"coffee ground\") material  (Exception: few red streaks in vomit that only happened once)    Negative: [1] Vomiting AND [2] contains bile (green color)    Negative: Tube contains red blood or black (\"coffee ground\") material  (Exception: pink tinge of tube fluid occurs once and clears immediately with irrigation.)    Protocols used: FEEDING TUBE SYMPTOMS AND AFSOBBVEN-V-GM      "

## 2023-01-12 NOTE — LETTER
1/12/2023      RE: Dinora Mcghee  [address]  [place] [address]         Answers for HPI/ROS submitted by the patient on 1/12/2023  General Symptoms: No  Skin Symptoms: Yes  HENT Symptoms: No  EYE SYMPTOMS: No  HEART SYMPTOMS: No  LUNG SYMPTOMS: No  INTESTINAL SYMPTOMS: Yes  URINARY SYMPTOMS: No  GYNECOLOGIC SYMPTOMS: No  BREAST SYMPTOMS: No  SKELETAL SYMPTOMS: No  BLOOD SYMPTOMS: No  NERVOUS SYSTEM SYMPTOMS: No  MENTAL HEALTH SYMPTOMS: No  Changes in hair: No  Changes in moles/birth marks: No  Itching: No  Rashes: No  Changes in nails: No  Acne: No  Hair in places you don't want it: No  Change in facial hair: No  Warts: No  Non-healing sores: No  Scarring: No  Flaking of skin: No  Color changes of hands/feet in cold : No  Sun sensitivity: No  Skin thickening: No  Heart burn or indigestion: Yes  Nausea: Yes  Vomiting: Yes  Abdominal pain: Yes  Bloating: Yes  Constipation: Yes  Diarrhea: Yes  Blood in stool: No  Black stools: No  Rectal or Anal pain: No  Fecal incontinence: No  Yellowing of skin or eyes: No  Vomit with blood: No  Change in stools: Yes        Service Date: 01/12/2023    HISTORY OF PRESENT ILLNESS:  Ms. Loo is here about 2 weeks after lysis of adhesions and open placement of jejunostomy tube.  She notes that this morning, her jejunostomy tube fell out.  She is also noting some pain in her back.  She denies fevers, chills or sweats.  Until this morning, she has been tolerating tube feeds.    PHYSICAL EXAMINATION:  Her abdomen is soft and nontender.  Her wound is well healed in the midline and staples are removed.  The J tube site in the left lower quadrant is erythematous with tenderness at the 2 suture sites.      I took a 10-S[place]an red rubber catheter and was able to pass it without resistance into the tract for the J tube site.  We sent Ms. Loo down to CT scanner where they infused some contrast into the tube.  To my read, the tube is in the intraluminal space and contrast is within  the small bowel.  I do not see any free air or other obvious abnormalities on the CT scan.    I sewed the 10-Luxembourgish jejunostomy tube in place with 1% lidocaine and 2-0 nylon suture.  Sterile dressing was applied.    Follow up in 1 week.    DIAGNOSES:  1.  Displaced jejunostomy tube.  2.  Malnutrition, requiring tube feeds.  3.  Gastroparesis.    Elver Bauer MD        D: 2023   T: 2023   MT: dario    Name:     MALINI DAVISGilbert  MRN:      [id number]        Account:      [id number]   :      1965           Service Date: 2023       Document: [id number]

## 2023-01-12 NOTE — LETTER
1/12/2023       RE: Dinora Mcghee  [address] [facility] [address]     Dear Colleague,    Thank you for referring your patient, Dinora Mcghee, to the [facility] [place] [facility]. Please see a copy of my visit note below.      Answers for HPI/ROS submitted by the patient on 1/12/2023  General Symptoms: No  Skin Symptoms: Yes  HENT Symptoms: No  EYE SYMPTOMS: No  HEART SYMPTOMS: No  LUNG SYMPTOMS: No  INTESTINAL SYMPTOMS: Yes  URINARY SYMPTOMS: No  GYNECOLOGIC SYMPTOMS: No  BREAST SYMPTOMS: No  SKELETAL SYMPTOMS: No  BLOOD SYMPTOMS: No  NERVOUS SYSTEM SYMPTOMS: No  MENTAL HEALTH SYMPTOMS: No  Changes in hair: No  Changes in moles/birth marks: No  Itching: No  Rashes: No  Changes in nails: No  Acne: No  Hair in places you don't want it: No  Change in facial hair: No  Warts: No  Non-healing sores: No  Scarring: No  Flaking of skin: No  Color changes of hands/feet in cold : No  Sun sensitivity: No  Skin thickening: No  Heart burn or indigestion: Yes  Nausea: Yes  Vomiting: Yes  Abdominal pain: Yes  Bloating: Yes  Constipation: Yes  Diarrhea: Yes  Blood in stool: No  Black stools: No  Rectal or Anal pain: No  Fecal incontinence: No  Yellowing of skin or eyes: No  Vomit with blood: No  Change in stools: Yes          Again, thank you for allowing me to participate in the care of your patient.      Sincerely,    Elver Bauer MD

## 2023-01-12 NOTE — PROGRESS NOTES
Service Date: 2023    HISTORY OF PRESENT ILLNESS:  Ms. Loo is here about 2 weeks after lysis of adhesions and open placement of jejunostomy tube.  She notes that this morning, her jejunostomy tube fell out.  She is also noting some pain in her back.  She denies fevers, chills or sweats.  Until this morning, she has been tolerating tube feeds.    PHYSICAL EXAMINATION:  Her abdomen is soft and nontender.  Her wound is well healed in the midline and staples are removed.  The J tube site in the left lower quadrant is erythematous with tenderness at the 2 suture sites.      I took a 10-Belizean red rubber catheter and was able to pass it without resistance into the tract for the J tube site.  We sent Ms. Loo down to CT scanner where they infused some contrast into the tube.  To my read, the tube is in the intraluminal space and contrast is within the small bowel.  I do not see any free air or other obvious abnormalities on the CT scan.    I sewed the 10-Belizean jejunostomy tube in place with 1% lidocaine and 2-0 nylon suture.  Sterile dressing was applied.    Follow up in 1 week.    DIAGNOSES:  1.  Displaced jejunostomy tube.  2.  Malnutrition, requiring tube feeds.  3.  Gastroparesis.    Elver Bauer MD        D: 2023   T: 2023   MT: dario    Name:     MALINI DAVIS  MRN:      [id number]        Account:      [id number]   :      1965           Service Date: 2023       Document: [id number]

## 2023-01-17 RX ADMIN — OXYCODONE HYDROCHLORIDE 5 MG: 5 TABLET ORAL at 11:35

## 2023-01-18 ENCOUNTER — TELEPHONE (OUTPATIENT)
Dept: TRANSPLANT | Facility: CLINIC | Age: 58
End: 2023-01-18
Payer: COMMERCIAL

## 2023-01-18 DIAGNOSIS — K31.84 GASTROPARESIS: ICD-10-CM

## 2023-01-18 RX ORDER — ACETAMINOPHEN 325 MG/10.15ML
650 LIQUID ORAL EVERY 6 HOURS PRN
Qty: 473 ML | Refills: 0 | Status: SHIPPED | OUTPATIENT
Start: 2023-01-18 | End: 2023-02-15

## 2023-01-18 NOTE — TELEPHONE ENCOUNTER
Called Dinora to check in and let her know we are changing her appointment with Dr Bauer to 10:30 tomorrow 1/19, she is okay with this.  Patients G-tube fell out last Friday and patient had a back up tube and placed that one with no difficulties.  Home care came out the follow day and assessed everything.  Dinora's J-tube is working fine but it leaks all day long so I am trying to find options for connecting her feeding tube.  Her J-tube site was extremely painful when it was placed and has slowly gotten better but she stated it was horrible pain and she only take tylenol.  We will discuss more tomorrow at her appointment.

## 2023-01-19 ENCOUNTER — TELEPHONE (OUTPATIENT)
Dept: ANESTHESIOLOGY | Facility: CLINIC | Age: 58
End: 2023-01-19

## 2023-01-19 ENCOUNTER — OFFICE VISIT (OUTPATIENT)
Dept: SURGERY | Facility: CLINIC | Age: 58
End: 2023-01-19
Payer: COMMERCIAL

## 2023-01-19 VITALS
WEIGHT: 134.1 LBS | OXYGEN SATURATION: 97 % | HEIGHT: 67 IN | HEART RATE: 112 BPM | SYSTOLIC BLOOD PRESSURE: 103 MMHG | DIASTOLIC BLOOD PRESSURE: 71 MMHG | BODY MASS INDEX: 21.05 KG/M2

## 2023-01-19 DIAGNOSIS — K94.23 GASTROSTOMY TUBE OBSTRUCTION (H): ICD-10-CM

## 2023-01-19 DIAGNOSIS — E16.2 HYPOGLYCEMIA: Primary | ICD-10-CM

## 2023-01-19 DIAGNOSIS — E44.0 MODERATE PROTEIN-CALORIE MALNUTRITION (H): Primary | ICD-10-CM

## 2023-01-19 DIAGNOSIS — K31.84 GASTROPARESIS: ICD-10-CM

## 2023-01-19 DIAGNOSIS — E23.2 DIABETES INSIPIDUS (H): ICD-10-CM

## 2023-01-19 DIAGNOSIS — R10.84 ABDOMINAL PAIN, GENERALIZED: ICD-10-CM

## 2023-01-19 PROCEDURE — 99024 POSTOP FOLLOW-UP VISIT: CPT | Performed by: SURGERY

## 2023-01-19 RX ORDER — OMEPRAZOLE
40 KIT
Qty: 300 ML | Refills: 11 | Status: ON HOLD | OUTPATIENT
Start: 2023-01-19 | End: 2023-02-02

## 2023-01-19 RX ORDER — ACETAMINOPHEN 160 MG/5ML
15 SUSPENSION ORAL EVERY 6 HOURS PRN
Status: ON HOLD | COMMUNITY
End: 2023-02-02

## 2023-01-19 RX ORDER — ACETAMINOPHEN 160 MG/5ML
650 LIQUID ORAL EVERY 6 HOURS PRN
Qty: 473 ML | Refills: 3 | Status: ON HOLD | OUTPATIENT
Start: 2023-01-19 | End: 2023-02-02

## 2023-01-19 RX ORDER — FAMOTIDINE 40 MG/5ML
20 POWDER, FOR SUSPENSION ORAL DAILY
Qty: 50 ML | Refills: 3 | Status: SHIPPED | OUTPATIENT
Start: 2023-01-19 | End: 2023-04-18

## 2023-01-19 ASSESSMENT — ENCOUNTER SYMPTOMS
HOARSE VOICE: 1
MUSCLE WEAKNESS: 1
SMELL DISTURBANCE: 0
TROUBLE SWALLOWING: 0
MYALGIAS: 0
NAUSEA: 1
CONSTIPATION: 0
NECK MASS: 0
POLYDIPSIA: 1
NECK PAIN: 0
HEARTBURN: 1
VOMITING: 1
HALLUCINATIONS: 0
BLOATING: 1
ALTERED TEMPERATURE REGULATION: 0
POLYPHAGIA: 0
BOWEL INCONTINENCE: 0
FEVER: 0
MUSCLE CRAMPS: 1
BACK PAIN: 1
SINUS PAIN: 0
ABDOMINAL PAIN: 1
TASTE DISTURBANCE: 1
CHILLS: 0
INCREASED ENERGY: 1
SORE THROAT: 1
WEIGHT GAIN: 0
WEIGHT LOSS: 1
DIARRHEA: 1
JOINT SWELLING: 0
DECREASED APPETITE: 1
BLOOD IN STOOL: 0
JAUNDICE: 0
ARTHRALGIAS: 0
RECTAL PAIN: 0
STIFFNESS: 0
FATIGUE: 1
NIGHT SWEATS: 0
SINUS CONGESTION: 0

## 2023-01-19 ASSESSMENT — PAIN SCALES - GENERAL: PAINLEVEL: MODERATE PAIN (4)

## 2023-01-19 ASSESSMENT — PATIENT HEALTH QUESTIONNAIRE - PHQ9
10. IF YOU CHECKED OFF ANY PROBLEMS, HOW DIFFICULT HAVE THESE PROBLEMS MADE IT FOR YOU TO DO YOUR WORK, TAKE CARE OF THINGS AT HOME, OR GET ALONG WITH OTHER PEOPLE: VERY DIFFICULT
SUM OF ALL RESPONSES TO PHQ QUESTIONS 1-9: 7
SUM OF ALL RESPONSES TO PHQ QUESTIONS 1-9: 7

## 2023-01-19 NOTE — NURSING NOTE
"Chief Complaint   Patient presents with     RECHECK     Return consult       Vitals:    01/19/23 1028   BP: 103/71   BP Location: Left arm   Patient Position: Sitting   Cuff Size: Adult Regular   Pulse: 112   SpO2: 97%   Weight: 60.8 kg (134 lb 1.6 oz)   Height: 1.702 m (5' 7\")       Body mass index is 21 kg/m .                          Buddy Issa, EMT    "

## 2023-01-19 NOTE — LETTER
1/19/2023      RE: Dinora Mcghee  [address] [facility] [address]       See dictated note: [id number]  30 min    GB  Answers for HPI/ROS submitted by the patient on 1/19/2023  If you checked off any problems, how difficult have these problems made it for you to do your work, take care of things at home, or get along with other people?: Very difficult  PHQ9 TOTAL SCORE: 7  General Symptoms: Yes  Skin Symptoms: No  HENT Symptoms: Yes  EYE SYMPTOMS: No  HEART SYMPTOMS: No  LUNG SYMPTOMS: No  INTESTINAL SYMPTOMS: Yes  URINARY SYMPTOMS: No  GYNECOLOGIC SYMPTOMS: No  BREAST SYMPTOMS: No  SKELETAL SYMPTOMS: Yes  BLOOD SYMPTOMS: No  NERVOUS SYSTEM SYMPTOMS: No  MENTAL HEALTH SYMPTOMS: No  Ear pain: No  Ear discharge: No  Hearing loss: No  Tinnitus: No  Nosebleeds: No  Congestion: No  Sinus pain: No  Trouble swallowing: No   Voice hoarseness: Yes  Mouth sores: No  Sore throat: Yes  Tooth pain: No  Gum tenderness: No  Bleeding gums: No  Change in taste: Yes  Change in sense of smell: No  Dry mouth: Yes  Hearing aid used: No  Neck lump: No  Fever: No  Loss of appetite: Yes  Weight loss: Yes  Weight gain: No  Fatigue: Yes  Night sweats: No  Chills: No  Increased stress: No  Excessive hunger: No  Excessive thirst: Yes  Feeling hot or cold when others believe the temperature is normal: No  Loss of height: No  Post-operative complications: No  Surgical site pain: Yes  Hallucinations: No  Change in or Loss of Energy: Yes  Hyperactivity: No  Confusion: No  Heart burn or indigestion: Yes  Nausea: Yes  Vomiting: Yes  Abdominal pain: Yes  Bloating: Yes  Constipation: No  Diarrhea: Yes  Blood in stool: No  Black stools: No  Rectal or Anal pain: No  Fecal incontinence: No  Yellowing of skin or eyes: No  Vomit with blood: No  Change in stools: Yes  Back pain: Yes  Muscle aches: No  Neck pain: No  Swollen joints: No  Joint pain: No  Bone pain: No  Muscle cramps: Yes  Muscle weakness: Yes  Joint stiffness: No  Bone fracture:  No        Service Date: 2023    HISTORY OF PRESENT ILLNESS:  Ms. Loo is here about 4 weeks after exploratory laparotomy, lysis of adhesions and placement of an open J-tube.  Since my last clinic visit, she is doing much better.  Her J-tube is functioning well.  She is having minimal drainage around the tube and is only changing this twice a day.  Her G-tube site is clean and dry.    PHYSICAL EXAMINATION:  Her abdomen is soft and nontender.  Her wound is well healed.  G-tube and J-tube sites are clean and dry.    ASSESSMENT AND PLAN:  1.  Malnutrition secondary to gastroparesis, status post J-tube placement.  Continue tube feeds.  2.  Repeated perforation of the G-tube balloon.  I am suspicious that she has residual tack left from her last G-tube placement.  We will talk to Dr. Park about this at next week's Chronic Pancreatitis Working Group meeting.  3.  Gastroparesis.  Continue venting.  4.  Status post ex-lap and lysis of adhesions, doing well.  I plan on seeing her in followup in about 1 month.    Elver Bauer MD    Visit time 30 minutes.    cc:  Mandeep Park MD  [facility] - [facility]astroenter[facility]  [address] [place] [address], [address]  [address]        D: 2023   T: 2023   MT: loi    Name:     MALINI DAVIS  MRN:      [id number]        Account:      [id number]   :      1965           Service Date: 2023       Document: [id number]

## 2023-01-19 NOTE — PROGRESS NOTES
See dictated note: [id number]  30 min    GB  Answers for HPI/ROS submitted by the patient on 1/19/2023  If you checked off any problems, how difficult have these problems made it for you to do your work, take care of things at home, or get along with other people?: Very difficult  PHQ9 TOTAL SCORE: 7  General Symptoms: Yes  Skin Symptoms: No  HENT Symptoms: Yes  EYE SYMPTOMS: No  HEART SYMPTOMS: No  LUNG SYMPTOMS: No  INTESTINAL SYMPTOMS: Yes  URINARY SYMPTOMS: No  GYNECOLOGIC SYMPTOMS: No  BREAST SYMPTOMS: No  SKELETAL SYMPTOMS: Yes  BLOOD SYMPTOMS: No  NERVOUS SYSTEM SYMPTOMS: No  MENTAL HEALTH SYMPTOMS: No  Ear pain: No  Ear discharge: No  Hearing loss: No  Tinnitus: No  Nosebleeds: No  Congestion: No  Sinus pain: No  Trouble swallowing: No   Voice hoarseness: Yes  Mouth sores: No  Sore throat: Yes  Tooth pain: No  Gum tenderness: No  Bleeding gums: No  Change in taste: Yes  Change in sense of smell: No  Dry mouth: Yes  Hearing aid used: No  Neck lump: No  Fever: No  Loss of appetite: Yes  Weight loss: Yes  Weight gain: No  Fatigue: Yes  Night sweats: No  Chills: No  Increased stress: No  Excessive hunger: No  Excessive thirst: Yes  Feeling hot or cold when others believe the temperature is normal: No  Loss of height: No  Post-operative complications: No  Surgical site pain: Yes  Hallucinations: No  Change in or Loss of Energy: Yes  Hyperactivity: No  Confusion: No  Heart burn or indigestion: Yes  Nausea: Yes  Vomiting: Yes  Abdominal pain: Yes  Bloating: Yes  Constipation: No  Diarrhea: Yes  Blood in stool: No  Black stools: No  Rectal or Anal pain: No  Fecal incontinence: No  Yellowing of skin or eyes: No  Vomit with blood: No  Change in stools: Yes  Back pain: Yes  Muscle aches: No  Neck pain: No  Swollen joints: No  Joint pain: No  Bone pain: No  Muscle cramps: Yes  Muscle weakness: Yes  Joint stiffness: No  Bone fracture: No

## 2023-01-19 NOTE — TELEPHONE ENCOUNTER
[facility] Call Center    Phone Message    May a detailed message be left on voicemail: yes     Reason for Call: Other: Appointment canceled for Dinora Mcghee ([id number]) Visit Type: RETURN PATIENT  Date        Time      Length    Provider                  Department  1/23/2023    2:10 PM  20 mins.  Monika Mahajan MD         [facility]     Reason for Cancellation: Cancelled via MyChart     Patient Comments: Can I postpone this visit until I have the j tube in for a while longer? Current pain regime is working and removal of abdominal adhesions took care of my URQ and ULQ pain. With current plan I'm at 4/10.Please advise if I should reschedule sooner.       Action Taken: Other: Routed to [facility] pool    Travel Screening: Not Applicable

## 2023-01-19 NOTE — PROGRESS NOTES
Service Date: 2023    HISTORY OF PRESENT ILLNESS:  Ms. Loo is here about 4 weeks after exploratory laparotomy, lysis of adhesions and placement of an open J-tube.  Since my last clinic visit, she is doing much better.  Her J-tube is functioning well.  She is having minimal drainage around the tube and is only changing this twice a day.  Her G-tube site is clean and dry.    PHYSICAL EXAMINATION:  Her abdomen is soft and nontender.  Her wound is well healed.  G-tube and J-tube sites are clean and dry.    ASSESSMENT AND PLAN:  1.  Malnutrition secondary to gastroparesis, status post J-tube placement.  Continue tube feeds.  2.  Repeated perforation of the G-tube balloon.  I am suspicious that she has residual tack left from her last G-tube placement.  We will talk to Dr. Park about this at next week's Chronic Pancreatitis Working Group meeting.  3.  Gastroparesis.  Continue venting.  4.  Status post ex-lap and lysis of adhesions, doing well.  I plan on seeing her in followup in about 1 month.    Elver Bauer MD    Visit time 30 minutes.    cc:  Mandeep Park MD  [facility] - [facility]astroenter[facility]  [address] [place] [address], [address]  [address]    Elver Bauer MD        D: 2023   T: 2023   MT: loi    Name:     MALINI DAVIS  MRN:      [id number]        Account:      [id number]   :      1965           Service Date: 2023       Document: [id number]

## 2023-01-26 ENCOUNTER — OFFICE VISIT (OUTPATIENT)
Dept: SURGERY | Facility: CLINIC | Age: 58
End: 2023-01-26
Payer: COMMERCIAL

## 2023-01-26 VITALS
DIASTOLIC BLOOD PRESSURE: 77 MMHG | HEIGHT: 67 IN | HEART RATE: 111 BPM | WEIGHT: 139.8 LBS | SYSTOLIC BLOOD PRESSURE: 120 MMHG | OXYGEN SATURATION: 98 % | BODY MASS INDEX: 21.94 KG/M2

## 2023-01-26 DIAGNOSIS — E44.0 MODERATE PROTEIN-CALORIE MALNUTRITION (H): Primary | ICD-10-CM

## 2023-01-26 DIAGNOSIS — K94.13 MALFUNCTIONING JEJUNOSTOMY TUBE (H): ICD-10-CM

## 2023-01-26 PROCEDURE — 99024 POSTOP FOLLOW-UP VISIT: CPT | Performed by: SURGERY

## 2023-01-26 ASSESSMENT — PAIN SCALES - GENERAL: PAINLEVEL: MODERATE PAIN (4)

## 2023-01-26 NOTE — PROGRESS NOTES
Service Date: 2023    HISTORY OF PRESENT ILLNESS:  Ms. Loo is here because her jejunostomy feeding tube ruptured yesterday and she was unable to use the tube for feeding.  She is receiving 1 liter a day of IV fluids.    PHYSICAL EXAMINATION:  The jejunostomy feeding tube in the left lower quadrant is a 10-Estonian feeding tube, a red rubber catheter.  The flange portion of the catheter was torn and could not be repaired.    1% lidocaine was infused around the J tube site and the suture was cut.  We removed the J tube without any difficulty (she is about 1 month out after her placement of a jejunostomy feeding tube).    We initially attempted to place a 14-Estonian pediatric gastrostomy tube.  This, unfortunately, was unable to be placed due to the size of the tract.  Despite dilating the tract under local anesthesia, we were unable to get the 14-Estonian tube to go into place. A replacement 10-Estonian red rubber catheter was passed without difficulty into the tract and into the GI tract.  We aspirated bilious material.  The tube readily flushed.  The tube was secured in place using a single interrupted suture of 2-0 nylon.  Sterile dressing was applied.  The patient tolerated the procedure well without complications.      DIAGNOSIS:  Malfunctioning jejunostomy feeding tube.    Elver Bauer MD        D: 2023   T: 2023   MT: carleen    Name:     SUSAN, MALINISILVA CORDERO  MRN:      [id number]        Account:      [id number]   :      1965           Service Date: 2023       Document: [id number]

## 2023-01-26 NOTE — LETTER
1/26/2023       RE: Dinora Mcghee  [address] [facility] [address]     Dear Colleague,    Thank you for referring your patient, Dinora Mcghee, to the [facility] [place] [facility]. Please see a copy of my visit note below.    See dictated note: [id number]  GB        Again, thank you for allowing me to participate in the care of your patient.      Sincerely,    Elver Bauer MD

## 2023-01-26 NOTE — NURSING NOTE
"Chief Complaint   Patient presents with     RECHECK     Return consult       Vitals:    01/26/23 0922   BP: 120/77   BP Location: Left arm   Patient Position: Sitting   Cuff Size: Adult Regular   Pulse: 111   SpO2: 98%   Weight: 63.4 kg (139 lb 12.8 oz)   Height: 1.702 m (5' 7\")       Body mass index is 21.9 kg/m .                          Buddy Issa, EMT    "

## 2023-01-26 NOTE — LETTER
2023      RE: Dinora Mcghee  [address]  [place] [address]       See dictated note: [id number]  GB      Service Date: 2023    HISTORY OF PRESENT ILLNESS:  Ms. Loo is here because her jejunostomy feeding tube ruptured yesterday and she was unable to use the tube for feeding.  She is receiving 1 liter a day of IV fluids.    PHYSICAL EXAMINATION:  The jejunostomy feeding tube in the left lower quadrant is a 10-Finnish feeding tube, a red rubber catheter.  The flange portion of the catheter was torn and could not be repaired.    1% lidocaine was infused around the J tube site and the suture was cut.  We removed the J tube without any difficulty (she is about 1 month out after her placement of a jejunostomy feeding tube).    We initially attempted to place a 14-Finnish pediatric gastrostomy tube.  This, unfortunately, was unable to be placed due to the size of the tract.  Despite dilating the tract under local anesthesia, we were unable to get the 14-Finnish tube to go into place. A replacement 10-Finnish red rubber catheter was passed without difficulty into the tract and into the GI tract.  We aspirated bilious material.  The tube readily flushed.  The tube was secured in place using a single interrupted suture of 2-0 nylon.  Sterile dressing was applied.  The patient tolerated the procedure well without complications.      DIAGNOSIS:  Malfunctioning jejunostomy feeding tube.    Elver Bauer MD        D: 2023   T: 2023   MT: carleen    Name:     DINORA MCGHEE  MRN:      [id number]        Account:      [id number]   :      1965           Service Date: 2023       Document: [id number]

## 2023-01-27 ENCOUNTER — PREP FOR PROCEDURE (OUTPATIENT)
Dept: TRANSPLANT | Facility: CLINIC | Age: 58
End: 2023-01-27
Payer: COMMERCIAL

## 2023-01-27 ENCOUNTER — PREP FOR PROCEDURE (OUTPATIENT)
Dept: GASTROENTEROLOGY | Facility: CLINIC | Age: 58
End: 2023-01-27
Payer: COMMERCIAL

## 2023-01-27 DIAGNOSIS — Z46.59 ENCOUNTER FOR CARE RELATED TO FEEDING TUBE: Primary | ICD-10-CM

## 2023-01-27 DIAGNOSIS — Z78.9 ENCOUNTER FOR GASTROJEJUNAL (GJ) TUBE PLACEMENT: Primary | ICD-10-CM

## 2023-01-27 PROBLEM — Z71.89 ACP (ADVANCE CARE PLANNING): Chronic | Status: ACTIVE | Noted: 2017-03-28

## 2023-01-29 ENCOUNTER — PREP FOR PROCEDURE (OUTPATIENT)
Dept: TRANSPLANT | Facility: CLINIC | Age: 58
End: 2023-01-29
Payer: COMMERCIAL

## 2023-01-30 ENCOUNTER — ANESTHESIA EVENT (OUTPATIENT)
Dept: SURGERY | Facility: CLINIC | Age: 58
End: 2023-01-30
Payer: COMMERCIAL

## 2023-01-31 ENCOUNTER — APPOINTMENT (OUTPATIENT)
Dept: GENERAL RADIOLOGY | Facility: CLINIC | Age: 58
End: 2023-01-31
Attending: INTERNAL MEDICINE
Payer: COMMERCIAL

## 2023-01-31 ENCOUNTER — ANESTHESIA (OUTPATIENT)
Dept: SURGERY | Facility: CLINIC | Age: 58
End: 2023-01-31
Payer: COMMERCIAL

## 2023-01-31 ENCOUNTER — ANESTHESIA EVENT (OUTPATIENT)
Dept: SURGERY | Facility: CLINIC | Age: 58
End: 2023-01-31
Payer: COMMERCIAL

## 2023-01-31 ENCOUNTER — HOSPITAL ENCOUNTER (INPATIENT)
Facility: CLINIC | Age: 58
LOS: 5 days | Discharge: HOME HEALTH | End: 2023-02-05
Attending: INTERNAL MEDICINE | Admitting: SURGERY
Payer: COMMERCIAL

## 2023-01-31 DIAGNOSIS — R10.9 ACUTE POSTOPERATIVE ABDOMINAL PAIN: ICD-10-CM

## 2023-01-31 DIAGNOSIS — R63.39 FEEDING INTOLERANCE: ICD-10-CM

## 2023-01-31 DIAGNOSIS — R10.13 EPIGASTRIC PAIN: ICD-10-CM

## 2023-01-31 DIAGNOSIS — K65.1 POSTPROCEDURAL INTRAABDOMINAL ABSCESS (H): ICD-10-CM

## 2023-01-31 DIAGNOSIS — G89.18 ACUTE POSTOPERATIVE ABDOMINAL PAIN: ICD-10-CM

## 2023-01-31 DIAGNOSIS — T81.43XA POSTPROCEDURAL INTRAABDOMINAL ABSCESS (H): ICD-10-CM

## 2023-01-31 DIAGNOSIS — K31.84 GASTROPARESIS: Primary | ICD-10-CM

## 2023-01-31 DIAGNOSIS — Z29.89 NEED FOR PROPHYLACTIC IMMUNOTHERAPY: ICD-10-CM

## 2023-01-31 DIAGNOSIS — E10.9 TYPE 1 DIABETES MELLITUS WITHOUT COMPLICATION (H): ICD-10-CM

## 2023-01-31 LAB
GLUCOSE BLDC GLUCOMTR-MCNC: 124 MG/DL (ref 70–99)
GLUCOSE BLDC GLUCOMTR-MCNC: 148 MG/DL (ref 70–99)
GLUCOSE BLDC GLUCOMTR-MCNC: 90 MG/DL (ref 70–99)
UPPER GI ENDOSCOPY: NORMAL

## 2023-01-31 PROCEDURE — 250N000011 HC RX IP 250 OP 636: Performed by: NURSE ANESTHETIST, CERTIFIED REGISTERED

## 2023-01-31 PROCEDURE — 49451 REPLACE DUOD/JEJ TUBE PERC: CPT | Mod: 78 | Performed by: SURGERY

## 2023-01-31 PROCEDURE — 258N000003 HC RX IP 258 OP 636: Performed by: NURSE ANESTHETIST, CERTIFIED REGISTERED

## 2023-01-31 PROCEDURE — 710N000010 HC RECOVERY PHASE 1, LEVEL 2, PER MIN: Performed by: INTERNAL MEDICINE

## 2023-01-31 PROCEDURE — 250N000011 HC RX IP 250 OP 636: Performed by: INTERNAL MEDICINE

## 2023-01-31 PROCEDURE — 0D2DXUZ CHANGE FEEDING DEVICE IN LOWER INTESTINAL TRACT, EXTERNAL APPROACH: ICD-10-PCS | Performed by: SURGERY

## 2023-01-31 PROCEDURE — 250N000024 HC ISOFLURANE, PER MIN: Performed by: SURGERY

## 2023-01-31 PROCEDURE — 250N000009 HC RX 250: Performed by: NURSE ANESTHETIST, CERTIFIED REGISTERED

## 2023-01-31 PROCEDURE — 370N000017 HC ANESTHESIA TECHNICAL FEE, PER MIN: Performed by: INTERNAL MEDICINE

## 2023-01-31 PROCEDURE — 258N000003 HC RX IP 258 OP 636: Performed by: ANESTHESIOLOGY

## 2023-01-31 PROCEDURE — 120N000002 HC R&B MED SURG/OB UMMC

## 2023-01-31 PROCEDURE — 74019 RADEX ABDOMEN 2 VIEWS: CPT | Mod: 26 | Performed by: RADIOLOGY

## 2023-01-31 PROCEDURE — 250N000011 HC RX IP 250 OP 636: Performed by: ANESTHESIOLOGY

## 2023-01-31 PROCEDURE — 82962 GLUCOSE BLOOD TEST: CPT

## 2023-01-31 PROCEDURE — 999N000179 XR SURGERY CARM FLUORO LESS THAN 5 MIN W STILLS: Mod: TC

## 2023-01-31 PROCEDURE — 250N000011 HC RX IP 250 OP 636: Performed by: SURGERY

## 2023-01-31 PROCEDURE — 88304 TISSUE EXAM BY PATHOLOGIST: CPT | Mod: TC | Performed by: SURGERY

## 2023-01-31 PROCEDURE — 0DP60UZ REMOVAL OF FEEDING DEVICE FROM STOMACH, OPEN APPROACH: ICD-10-PCS | Performed by: SURGERY

## 2023-01-31 PROCEDURE — 999N000141 HC STATISTIC PRE-PROCEDURE NURSING ASSESSMENT: Performed by: INTERNAL MEDICINE

## 2023-01-31 PROCEDURE — 250N000009 HC RX 250: Performed by: ANESTHESIOLOGY

## 2023-01-31 PROCEDURE — 999N000065 XR ABDOMEN FLAT AND DECUB

## 2023-01-31 PROCEDURE — 360N000076 HC SURGERY LEVEL 3, PER MIN: Performed by: SURGERY

## 2023-01-31 PROCEDURE — 360N000083 HC SURGERY LEVEL 3 W/ FLUORO, PER MIN: Performed by: INTERNAL MEDICINE

## 2023-01-31 PROCEDURE — 0DNW0ZZ RELEASE PERITONEUM, OPEN APPROACH: ICD-10-PCS | Performed by: SURGERY

## 2023-01-31 PROCEDURE — 258N000003 HC RX IP 258 OP 636: Performed by: INTERNAL MEDICINE

## 2023-01-31 PROCEDURE — 88304 TISSUE EXAM BY PATHOLOGIST: CPT | Mod: 26 | Performed by: PATHOLOGY

## 2023-01-31 PROCEDURE — 999N000010 HC STATISTIC ANES STAT CODE-CRNA PER MINUTE: Performed by: SURGERY

## 2023-01-31 PROCEDURE — 0DHA0UZ INSERTION OF FEEDING DEVICE INTO JEJUNUM, OPEN APPROACH: ICD-10-PCS | Performed by: SURGERY

## 2023-01-31 PROCEDURE — 0DBA0ZZ EXCISION OF JEJUNUM, OPEN APPROACH: ICD-10-PCS | Performed by: SURGERY

## 2023-01-31 PROCEDURE — 255N000002 HC RX 255 OP 636: Performed by: INTERNAL MEDICINE

## 2023-01-31 PROCEDURE — 272N000002 HC OR SUPPLY OTHER OPNP: Performed by: INTERNAL MEDICINE

## 2023-01-31 PROCEDURE — C1769 GUIDE WIRE: HCPCS | Performed by: INTERNAL MEDICINE

## 2023-01-31 PROCEDURE — 272N000001 HC OR GENERAL SUPPLY STERILE: Performed by: INTERNAL MEDICINE

## 2023-01-31 PROCEDURE — 370N000017 HC ANESTHESIA TECHNICAL FEE, PER MIN: Performed by: SURGERY

## 2023-01-31 PROCEDURE — 44125 REMOVAL OF SMALL INTESTINE: CPT | Mod: 78 | Performed by: SURGERY

## 2023-01-31 PROCEDURE — 258N000003 HC RX IP 258 OP 636: Performed by: SURGERY

## 2023-01-31 PROCEDURE — 710N000010 HC RECOVERY PHASE 1, LEVEL 2, PER MIN: Performed by: SURGERY

## 2023-01-31 PROCEDURE — C1765 ADHESION BARRIER: HCPCS | Performed by: INTERNAL MEDICINE

## 2023-01-31 PROCEDURE — 272N000001 HC OR GENERAL SUPPLY STERILE: Performed by: SURGERY

## 2023-01-31 RX ORDER — DIPHENHYDRAMINE HCL 25 MG
12.5 TABLET ORAL EVERY 6 HOURS PRN
Status: DISCONTINUED | OUTPATIENT
Start: 2023-01-31 | End: 2023-02-01 | Stop reason: HOSPADM

## 2023-01-31 RX ORDER — SODIUM CHLORIDE, SODIUM LACTATE, POTASSIUM CHLORIDE, CALCIUM CHLORIDE 600; 310; 30; 20 MG/100ML; MG/100ML; MG/100ML; MG/100ML
INJECTION, SOLUTION INTRAVENOUS CONTINUOUS
Status: DISCONTINUED | OUTPATIENT
Start: 2023-01-31 | End: 2023-02-03

## 2023-01-31 RX ORDER — DIPHENHYDRAMINE HYDROCHLORIDE 50 MG/ML
25 INJECTION INTRAMUSCULAR; INTRAVENOUS EVERY 6 HOURS PRN
Status: DISCONTINUED | OUTPATIENT
Start: 2023-01-31 | End: 2023-02-01 | Stop reason: HOSPADM

## 2023-01-31 RX ORDER — SODIUM CHLORIDE, SODIUM LACTATE, POTASSIUM CHLORIDE, CALCIUM CHLORIDE 600; 310; 30; 20 MG/100ML; MG/100ML; MG/100ML; MG/100ML
INJECTION, SOLUTION INTRAVENOUS CONTINUOUS
Status: DISCONTINUED | OUTPATIENT
Start: 2023-01-31 | End: 2023-01-31 | Stop reason: HOSPADM

## 2023-01-31 RX ORDER — FENTANYL CITRATE 50 UG/ML
INJECTION, SOLUTION INTRAMUSCULAR; INTRAVENOUS PRN
Status: DISCONTINUED | OUTPATIENT
Start: 2023-01-31 | End: 2023-01-31

## 2023-01-31 RX ORDER — PROPOFOL 10 MG/ML
INJECTION, EMULSION INTRAVENOUS PRN
Status: DISCONTINUED | OUTPATIENT
Start: 2023-01-31 | End: 2023-01-31

## 2023-01-31 RX ORDER — FENTANYL CITRATE 50 UG/ML
25 INJECTION, SOLUTION INTRAMUSCULAR; INTRAVENOUS EVERY 5 MIN PRN
Status: DISCONTINUED | OUTPATIENT
Start: 2023-01-31 | End: 2023-01-31 | Stop reason: HOSPADM

## 2023-01-31 RX ORDER — ONDANSETRON 4 MG/1
4 TABLET, ORALLY DISINTEGRATING ORAL EVERY 6 HOURS PRN
Status: CANCELLED | OUTPATIENT
Start: 2023-01-31

## 2023-01-31 RX ORDER — LIDOCAINE HYDROCHLORIDE 20 MG/ML
INJECTION, SOLUTION INFILTRATION; PERINEURAL PRN
Status: DISCONTINUED | OUTPATIENT
Start: 2023-01-31 | End: 2023-01-31

## 2023-01-31 RX ORDER — IOPAMIDOL 510 MG/ML
INJECTION, SOLUTION INTRAVASCULAR PRN
Status: DISCONTINUED | OUTPATIENT
Start: 2023-01-31 | End: 2023-01-31 | Stop reason: HOSPADM

## 2023-01-31 RX ORDER — ERTAPENEM 1 G/1
1 INJECTION, POWDER, LYOPHILIZED, FOR SOLUTION INTRAMUSCULAR; INTRAVENOUS ONCE
Status: COMPLETED | OUTPATIENT
Start: 2023-01-31 | End: 2023-01-31

## 2023-01-31 RX ORDER — SODIUM CHLORIDE, SODIUM LACTATE, POTASSIUM CHLORIDE, CALCIUM CHLORIDE 600; 310; 30; 20 MG/100ML; MG/100ML; MG/100ML; MG/100ML
INJECTION, SOLUTION INTRAVENOUS CONTINUOUS PRN
Status: DISCONTINUED | OUTPATIENT
Start: 2023-01-31 | End: 2023-01-31

## 2023-01-31 RX ORDER — DIPHENHYDRAMINE HYDROCHLORIDE 50 MG/ML
INJECTION INTRAMUSCULAR; INTRAVENOUS PRN
Status: DISCONTINUED | OUTPATIENT
Start: 2023-01-31 | End: 2023-01-31

## 2023-01-31 RX ORDER — PROPOFOL 10 MG/ML
INJECTION, EMULSION INTRAVENOUS CONTINUOUS PRN
Status: DISCONTINUED | OUTPATIENT
Start: 2023-01-31 | End: 2023-01-31

## 2023-01-31 RX ORDER — ALBUTEROL SULFATE 0.83 MG/ML
2.5 SOLUTION RESPIRATORY (INHALATION) EVERY 4 HOURS PRN
Status: DISCONTINUED | OUTPATIENT
Start: 2023-01-31 | End: 2023-02-01 | Stop reason: HOSPADM

## 2023-01-31 RX ORDER — NALOXONE HYDROCHLORIDE 0.4 MG/ML
0.4 INJECTION, SOLUTION INTRAMUSCULAR; INTRAVENOUS; SUBCUTANEOUS
Status: CANCELLED | OUTPATIENT
Start: 2023-01-31

## 2023-01-31 RX ORDER — FENTANYL CITRATE 50 UG/ML
25 INJECTION, SOLUTION INTRAMUSCULAR; INTRAVENOUS EVERY 5 MIN PRN
Status: DISCONTINUED | OUTPATIENT
Start: 2023-01-31 | End: 2023-02-01 | Stop reason: HOSPADM

## 2023-01-31 RX ORDER — LIDOCAINE 40 MG/G
CREAM TOPICAL
Status: DISCONTINUED | OUTPATIENT
Start: 2023-01-31 | End: 2023-01-31 | Stop reason: HOSPADM

## 2023-01-31 RX ORDER — ONDANSETRON 2 MG/ML
4 INJECTION INTRAMUSCULAR; INTRAVENOUS EVERY 6 HOURS PRN
Status: CANCELLED | OUTPATIENT
Start: 2023-01-31

## 2023-01-31 RX ORDER — FLUMAZENIL 0.1 MG/ML
0.2 INJECTION, SOLUTION INTRAVENOUS
Status: CANCELLED | OUTPATIENT
Start: 2023-01-31 | End: 2023-02-01

## 2023-01-31 RX ORDER — FENTANYL CITRATE 50 UG/ML
50 INJECTION, SOLUTION INTRAMUSCULAR; INTRAVENOUS EVERY 5 MIN PRN
Status: DISCONTINUED | OUTPATIENT
Start: 2023-01-31 | End: 2023-01-31 | Stop reason: HOSPADM

## 2023-01-31 RX ORDER — SODIUM CHLORIDE, SODIUM LACTATE, POTASSIUM CHLORIDE, CALCIUM CHLORIDE 600; 310; 30; 20 MG/100ML; MG/100ML; MG/100ML; MG/100ML
INJECTION, SOLUTION INTRAVENOUS CONTINUOUS
Status: DISCONTINUED | OUTPATIENT
Start: 2023-01-31 | End: 2023-01-31

## 2023-01-31 RX ORDER — HYDROMORPHONE HYDROCHLORIDE 1 MG/ML
0.2 INJECTION, SOLUTION INTRAMUSCULAR; INTRAVENOUS; SUBCUTANEOUS EVERY 5 MIN PRN
Status: DISCONTINUED | OUTPATIENT
Start: 2023-01-31 | End: 2023-01-31 | Stop reason: HOSPADM

## 2023-01-31 RX ORDER — PROCHLORPERAZINE MALEATE 5 MG
10 TABLET ORAL EVERY 6 HOURS PRN
Status: CANCELLED | OUTPATIENT
Start: 2023-01-31

## 2023-01-31 RX ORDER — FENTANYL CITRATE 50 UG/ML
50 INJECTION, SOLUTION INTRAMUSCULAR; INTRAVENOUS EVERY 5 MIN PRN
Status: DISCONTINUED | OUTPATIENT
Start: 2023-01-31 | End: 2023-02-01 | Stop reason: HOSPADM

## 2023-01-31 RX ORDER — NALOXONE HYDROCHLORIDE 0.4 MG/ML
0.2 INJECTION, SOLUTION INTRAMUSCULAR; INTRAVENOUS; SUBCUTANEOUS
Status: CANCELLED | OUTPATIENT
Start: 2023-01-31

## 2023-01-31 RX ORDER — HYDROMORPHONE HYDROCHLORIDE 1 MG/ML
0.4 INJECTION, SOLUTION INTRAMUSCULAR; INTRAVENOUS; SUBCUTANEOUS EVERY 5 MIN PRN
Status: DISCONTINUED | OUTPATIENT
Start: 2023-01-31 | End: 2023-01-31 | Stop reason: HOSPADM

## 2023-01-31 RX ADMIN — LIDOCAINE HYDROCHLORIDE 60 MG: 20 INJECTION, SOLUTION INFILTRATION; PERINEURAL at 15:35

## 2023-01-31 RX ADMIN — SODIUM CHLORIDE, POTASSIUM CHLORIDE, SODIUM LACTATE AND CALCIUM CHLORIDE: 600; 310; 30; 20 INJECTION, SOLUTION INTRAVENOUS at 21:35

## 2023-01-31 RX ADMIN — PROPOFOL 70 MG: 10 INJECTION, EMULSION INTRAVENOUS at 15:36

## 2023-01-31 RX ADMIN — FENTANYL CITRATE 25 MCG: 50 INJECTION, SOLUTION INTRAMUSCULAR; INTRAVENOUS at 16:55

## 2023-01-31 RX ADMIN — FENTANYL CITRATE 50 MCG: 50 INJECTION, SOLUTION INTRAMUSCULAR; INTRAVENOUS at 23:03

## 2023-01-31 RX ADMIN — SODIUM CHLORIDE, POTASSIUM CHLORIDE, SODIUM LACTATE AND CALCIUM CHLORIDE: 600; 310; 30; 20 INJECTION, SOLUTION INTRAVENOUS at 19:13

## 2023-01-31 RX ADMIN — SODIUM CHLORIDE, POTASSIUM CHLORIDE, SODIUM LACTATE AND CALCIUM CHLORIDE: 600; 310; 30; 20 INJECTION, SOLUTION INTRAVENOUS at 22:58

## 2023-01-31 RX ADMIN — PHENYLEPHRINE HYDROCHLORIDE 100 MCG: 10 INJECTION INTRAVENOUS at 15:36

## 2023-01-31 RX ADMIN — PHENYLEPHRINE HYDROCHLORIDE 100 MCG: 10 INJECTION INTRAVENOUS at 16:18

## 2023-01-31 RX ADMIN — PROMETHAZINE HYDROCHLORIDE 25 MG: 25 INJECTION INTRAMUSCULAR; INTRAVENOUS at 15:40

## 2023-01-31 RX ADMIN — PROPOFOL 60 MG: 10 INJECTION, EMULSION INTRAVENOUS at 19:28

## 2023-01-31 RX ADMIN — HYDROMORPHONE HYDROCHLORIDE 0.4 MG: 1 INJECTION, SOLUTION INTRAMUSCULAR; INTRAVENOUS; SUBCUTANEOUS at 19:04

## 2023-01-31 RX ADMIN — DIPHENHYDRAMINE HYDROCHLORIDE 25 MG: 50 INJECTION, SOLUTION INTRAMUSCULAR; INTRAVENOUS at 15:43

## 2023-01-31 RX ADMIN — PROPOFOL 125 MCG/KG/MIN: 10 INJECTION, EMULSION INTRAVENOUS at 15:40

## 2023-01-31 RX ADMIN — PHENYLEPHRINE HYDROCHLORIDE 200 MCG: 10 INJECTION INTRAVENOUS at 20:13

## 2023-01-31 RX ADMIN — PROCHLORPERAZINE EDISYLATE 5 MG: 5 INJECTION INTRAMUSCULAR; INTRAVENOUS at 19:07

## 2023-01-31 RX ADMIN — DIATRIZOATE MEGLUMINE AND DIATRIZOATE SODIUM 50 ML: 660; 100 SOLUTION ORAL; RECTAL at 18:29

## 2023-01-31 RX ADMIN — HYDROMORPHONE HYDROCHLORIDE 0.4 MG: 1 INJECTION, SOLUTION INTRAMUSCULAR; INTRAVENOUS; SUBCUTANEOUS at 18:24

## 2023-01-31 RX ADMIN — SUGAMMADEX 150 MG: 100 INJECTION, SOLUTION INTRAVENOUS at 16:35

## 2023-01-31 RX ADMIN — PROPOFOL 120 MCG/KG/MIN: 10 INJECTION, EMULSION INTRAVENOUS at 19:56

## 2023-01-31 RX ADMIN — Medication 5 MG: at 19:27

## 2023-01-31 RX ADMIN — MIDAZOLAM 2 MG: 1 INJECTION INTRAMUSCULAR; INTRAVENOUS at 15:24

## 2023-01-31 RX ADMIN — FENTANYL CITRATE 50 MCG: 50 INJECTION, SOLUTION INTRAMUSCULAR; INTRAVENOUS at 22:47

## 2023-01-31 RX ADMIN — FENTANYL CITRATE 50 MCG: 50 INJECTION, SOLUTION INTRAMUSCULAR; INTRAVENOUS at 23:29

## 2023-01-31 RX ADMIN — FENTANYL CITRATE 100 MCG: 50 INJECTION, SOLUTION INTRAMUSCULAR; INTRAVENOUS at 15:35

## 2023-01-31 RX ADMIN — SODIUM CHLORIDE, POTASSIUM CHLORIDE, SODIUM LACTATE AND CALCIUM CHLORIDE: 600; 310; 30; 20 INJECTION, SOLUTION INTRAVENOUS at 15:24

## 2023-01-31 RX ADMIN — SUGAMMADEX 200 MG: 100 INJECTION, SOLUTION INTRAVENOUS at 21:25

## 2023-01-31 RX ADMIN — HYDROMORPHONE HYDROCHLORIDE 0.4 MG: 1 INJECTION, SOLUTION INTRAMUSCULAR; INTRAVENOUS; SUBCUTANEOUS at 17:37

## 2023-01-31 RX ADMIN — SUCCINYLCHOLINE CHLORIDE 100 MG: 20 INJECTION, SOLUTION INTRAMUSCULAR; INTRAVENOUS; PARENTERAL at 19:28

## 2023-01-31 RX ADMIN — HYDROMORPHONE HYDROCHLORIDE 0.4 MG: 1 INJECTION, SOLUTION INTRAMUSCULAR; INTRAVENOUS; SUBCUTANEOUS at 17:46

## 2023-01-31 RX ADMIN — PHENYLEPHRINE HYDROCHLORIDE 100 MCG: 10 INJECTION INTRAVENOUS at 15:52

## 2023-01-31 RX ADMIN — FENTANYL CITRATE 25 MCG: 50 INJECTION, SOLUTION INTRAMUSCULAR; INTRAVENOUS at 17:05

## 2023-01-31 RX ADMIN — HYDROMORPHONE HYDROCHLORIDE 0.4 MG: 1 INJECTION, SOLUTION INTRAMUSCULAR; INTRAVENOUS; SUBCUTANEOUS at 19:11

## 2023-01-31 RX ADMIN — Medication 15 MG: at 19:33

## 2023-01-31 RX ADMIN — PHENYLEPHRINE HYDROCHLORIDE 100 MCG: 10 INJECTION INTRAVENOUS at 19:49

## 2023-01-31 RX ADMIN — ERTAPENEM SODIUM 1 G: 1 INJECTION, POWDER, LYOPHILIZED, FOR SOLUTION INTRAMUSCULAR; INTRAVENOUS at 19:40

## 2023-01-31 RX ADMIN — PHENYLEPHRINE HYDROCHLORIDE 100 MCG: 10 INJECTION INTRAVENOUS at 19:57

## 2023-01-31 RX ADMIN — Medication 50 MG: at 15:36

## 2023-01-31 RX ADMIN — LIDOCAINE HYDROCHLORIDE 60 MG: 20 INJECTION, SOLUTION INFILTRATION; PERINEURAL at 19:27

## 2023-01-31 ASSESSMENT — ACTIVITIES OF DAILY LIVING (ADL)
ADLS_ACUITY_SCORE: 37

## 2023-01-31 ASSESSMENT — ENCOUNTER SYMPTOMS
SEIZURES: 0
SEIZURES: 0

## 2023-01-31 ASSESSMENT — LIFESTYLE VARIABLES
TOBACCO_USE: 1
TOBACCO_USE: 1

## 2023-01-31 NOTE — H&P
Here for G and J tube revision under GA    Doing well, no pain, no acute issues    PEX  Chest clear  CV no mrg  Abd, mutiple well healed scars, non tender  Ext no edema    Meds reviewed    IMP  Stable for endoscopy and tube revision under GA          Past Medical, Surgical, Family, and Social Histories:    Past Medical History:   Diagnosis Date     Allergic rhinitis, cause unspecified      Allergy to other foods     strawberries, apples, celeries, alice, watermelon     Arthritis     left knee     Choledocholithiasis     long after cholecystectomy     Chronic abdominal pain      Chronic constipation      Chronic nausea      Chronic pancreatitis (H)      Degeneration of lumbar or lumbosacral intervertebral disc      Esophageal reflux     w/ hiatal hernia     Gastroparesis      Hiatal hernia      History of pituitary adenoma     s/p resection     Hypothyroidism      Migraines      Mild hyperlipidemia      On tube feeding diet     presence of GJ tube     Pancreatic disease     PD stricture, 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 endometriosis growth     APPENDECTOMY        SECTION       COLONOSCOPY       ENDOSCOPIC INSERTION TUBE GASTROSTOMY N/A 2021    Procedure: Gastrojejonostomy placement with jejunopexy, PEG tube exchange;  Surgeon: Zackery Montoya MD;  Location:  OR     ENDOSCOPIC RETROGRADE CHOLANGIOPANCREATOGRAM N/A 2015    Procedure: ENDOSCOPIC RETROGRADE CHOLANGIOPANCREATOGRAM;  Surgeon: Mandeep Park MD;  Location: [facility]     ENDOSCOPIC RETROGRADE CHOLANGIOPANCREATOGRAM N/A 2016    Procedure: COMBINED ENDOSCOPIC RETROGRADE CHOLANGIOPANCREATOGRAPHY, PLACE  TUBE/STENT;  Surgeon: Mandeep Park MD;  [facility]     ENDOSCOPIC RETROGRADE CHOLANGIOPANCREATOGRAM N/A 3/17/2016    Procedure: COMBINED ENDOSCOPIC RETROGRADE CHOLANGIOPANCREATOGRAPHY, REMOVE FOREIGN BODY OR STENT/TUBE;  Surgeon: Mandeep Park MD;  [facility]     ENDOSCOPIC RETROGRADE CHOLANGIOPANCREATOGRAM N/A 8/2/2016    Procedure: COMBINED ENDOSCOPIC RETROGRADE CHOLANGIOPANCREATOGRAPHY, PLACE TUBE/STENT;  Surgeon: Mandeep Park MD;  [facility]     ENDOSCOPIC RETROGRADE CHOLANGIOPANCREATOGRAM N/A 8/26/2016    Procedure: COMBINED ENDOSCOPIC RETROGRADE CHOLANGIOPANCREATOGRAPHY, REMOVE FOREIGN BODY OR STENT/TUBE;  Surgeon: Mandeep Park MD;  [facility]     ENDOSCOPIC ULTRASOUND UPPER GASTROINTESTINAL TRACT (GI) N/A 10/3/2016    Procedure: ENDOSCOPIC ULTRASOUND, ESOPHAGOSCOPY / UPPER GASTROINTESTINAL TRACT (GI);  Surgeon: Guru Jose Rodas MD;  [facility]     ENTEROSCOPY SMALL BOWEL N/A 2/3/2022    Procedure: ENTEROSCOPY with possible fistula closure;  Surgeon: Francisco Dodson MD;  Location:  GI     ESOPHAGOSCOPY, GASTROSCOPY, DUODENOSCOPY (EGD), COMBINED N/A 6/24/2015    Procedure: COMBINED ESOPHAGOSCOPY, GASTROSCOPY, DUODENOSCOPY (EGD), REMOVE FOREIGN BODY;  Surgeon: Mandeep Park MD;  Location:  GI     ESOPHAGOSCOPY, GASTROSCOPY, DUODENOSCOPY (EGD), COMBINED N/A 10/25/2015    Procedure: COMBINED ESOPHAGOSCOPY, GASTROSCOPY, DUODENOSCOPY (EGD);  Surgeon: Sammy Amaro MD;  Location:  GI     ESOPHAGOSCOPY, GASTROSCOPY, DUODENOSCOPY (EGD), COMBINED N/A 10/25/2015    Procedure: COMBINED ESOPHAGOSCOPY, GASTROSCOPY, DUODENOSCOPY (EGD), BIOPSY SINGLE OR MULTIPLE;  Surgeon: Sammy Amaro MD;  Location:  GI     ESOPHAGOSCOPY, GASTROSCOPY, DUODENOSCOPY (EGD), DILATATION, COMBINED       EXCISE LESION TRUNK N/A 4/17/2017    Procedure: EXCISE LESION TRUNK;  Removal of Abdominal Foreign Body;  Surgeon: Nestor Phoenix,  MD;  [facility]     HC ESOPH/GAS REFLUX TEST W NASAL IMPED >1 HR N/A 11/19/2015    Procedure: ESOPHAGEAL IMPEDENCE FUNCTION TEST WITH 24 HOUR PH GREATER THAN 1 HOUR;  Surgeon: Thiago Apple MD;  [facility]     HC UGI ENDOSCOPY DIAG W BIOPSY  9/17/08     HC UGI ENDOSCOPY DIAG W BIOPSY  9/27/12     HC UGI ENDOSCOPY W ESOPHAGEAL DILATION BALLOON <30MM  9/17/08     HC UGI ENDOSCOPY W EUS N/A 5/5/2015    Procedure: COMBINED ENDOSCOPIC ULTRASOUND, ESOPHAGOSCOPY, GASTROSCOPY, DUODENOSCOPY (EGD);  Surgeon: Wm Dueñas MD;  [facility]     HC WRIST ARTHROSCOP,RELEASE XVERS LIG Bilateral 12/17/08     INJECT TRANSVERSUS ABDOMINIS PLANE (TAP) BLOCK BILATERAL Left 9/22/2016    Procedure: INJECT TRANSVERSUS ABDOMINIS PLANE (TAP) BLOCK BILATERAL;  Surgeon: Dickson Corrigan MD;  [facility]     INJECT TRIGGER POINT Bilateral 9/8/2022    Procedure: Abdominal trigger point injection with ultrasound;  Surgeon: Monika Mahajan MD;  [facility]     INJECT TRIGGER POINT SINGLE / MULTIPLE 3 OR MORE MUSCLES Right 11/15/2022    Procedure: Trigger point injections right abdomen with ultrasound guidance;  Surgeon: Monika Mahajan MD;  Location: [facility]     IR CHEST PORT PLACEMENT < 5 YRS OF AGE  6/10/2022     laparoscopic pineda  1995     LAPAROSCOPIC HERNIORRHAPHY INCISIONAL N/A 8/23/2018    Procedure: LAPAROSCOPIC HERNIORRHAPHY INCISIONAL;  Laparoscopic Incisional Hernia Repair with Symbotex Mesh Implant;  Surgeon: Nestor Phoenix MD;  [facility]     LAPAROSCOPIC PANCREATECTOMY, TRANSPLANT AUTO ISLET CELL N/A 12/28/2016    Procedure: LAPAROSCOPIC PANCREATECTOMY, TRANSPLANT AUTO ISLET CELL;  Surgeon: Nestor Phoenix MD;  [facility]     REMOVE AND REPLACE BREAST IMPLANT PROSTHESIS N/A 12/30/2022    Procedure: Exploratory Laparotomy, lysis of adhesions, small bowel resection. Placement of gastric jejunostomy for enteral feeding.;  Surgeon: Elver Bauer MD;  [facility]     REMOVE  GASTROSTOMY TUBE ADULT N/A 2022    Procedure: REMOVAL, GASTROSTOMY TUBE, ADULT;  Surgeon: Mandeep Park MD;  [facility]     REPLACE GASTROJEJUNOSTOMY TUBE, PERCUTANEOUS N/A 2021    Procedure: GASTROJEJUNOSTOMY TUBE PLACEMENT, PERCUTANEOUS, WITH GASTROPEXY;  Surgeon: Mandeep Park MD;  [facility]     REPLACE GASTROJEJUNOSTOMY TUBE, PERCUTANEOUS N/A 2021    Procedure: REPLACEMENT, GASTROJEJUNOSTOMY TUBE, PERCUTANEOUS;  Surgeon: Zackery Montoya MD;  [facility]     REPLACE GASTROJEJUNOSTOMY TUBE, PERCUTANEOUS N/A 2022    Procedure: REPLACEMENT, GASTROJEJUNOSTOMY TUBE, PERCUTANEOUS;  Surgeon: Mandeep Park MD;  [facility]     REPLACE GASTROJEJUNOSTOMY TUBE, PERCUTANEOUS N/A 2022    Procedure: REPLACEMENT, GASTROJEJUNOSTOMY TUBE, PERCUTANEOUS with ENDOSCOPIC SUTURING.;  Surgeon: Mandeep Park MD;  [facility]     REPLACE JEJUNOSTOMY TUBE, PERCUTANEOUS N/A 9/10/2021    Procedure: UPPER ENDOSCOPY, REPLACEMENT OF PERCUTANEOUS GASTROJEJUNOSTOMY TUBE, T-TAG GASTROPEXY;  Surgeon: Zackery Montoya MD;  [facility]     REPLACE JEJUNOSTOMY TUBE, PERCUTANEOUS N/A 2021    Procedure: REPLACEMENT, JEJUNOSTOMY TUBE, PERCUTANEOUS;  Surgeon: Mandeep Park MD;  [facility]     transphenoidal pituitary resection       Z  DELIVERY ONLY       Z  DELIVERY ONLY      repeat c section with incidental cystotomy with repair     Z EXCIS PITUITARY,TRANSNASAL/SEPTAL      pituitary tumor removed for diabetes insipidus     [facility] TOTAL ABDOM HYSTERECTOMY      w/ bilateral salpingoophorectomy        Family History   Problem Relation Age of Onset     Lipids Mother      Hypertension Mother      Thyroid Disease Mother      Depression Mother      Angina Mother      GERD Mother      Skin Cancer Mother      Migraines Mother      Eye Disorder Father         cataract, detached retina     Myocardial  Infarction Father 60     Lipids Father      Cerebrovascular Disease Father      Depression Father      Substance Abuse Father      Anesthesia Reaction Father         stroke right after surgery     Cataracts Father      Osteoarthritis Father      Ulcerative Colitis Father      Interpersonal Violence Sister      Coronary Artery Disease Sister         angioplasty     GERD Sister      Substance Abuse Sister      Depression Sister      Thyroid Disease Sister      Eye Disorder Maternal Grandmother         cataract     Thyroid Disease Maternal Grandmother      Diabetes Maternal Grandfather      Eye Disorder Paternal Grandmother         cataract     Eye Disorder Paternal Grandfather         cataract     Diabetes Paternal Grandfather      Eye Disorder Son         ptosis     Depression Son      Anxiety Disorder Son      Heart Disease Paternal Aunt      Diabetes Paternal Aunt      Diabetes Paternal Uncle      Heart Disease Paternal Uncle      Depression Nephew      Anxiety Disorder Nephew      Thyroid Disease Nephew      Diabetes Type 2  Cousin         paternal cousin       Social History     Tobacco Use     Smoking status: Former     Packs/day: 0.50     Years: 6.00     Pack years: 3.00     Types: Cigarettes     Start date: 1985     Quit date: 1992     Years since quittin.1     Smokeless tobacco: Never     Tobacco comments:     no 2nd hand   Substance Use Topics     Alcohol use: Not Currently     Alcohol/week: 3.0 - 6.0 standard drinks     Types: 1 - 2 Glasses of wine, 1 - 2 Cans of beer, 1 - 2 Shots of liquor per week     Comment: none since IGG

## 2023-01-31 NOTE — ANESTHESIA PROCEDURE NOTES
Airway       Patient location during procedure: OR       Procedure Start/Stop Times: 1/31/2023 3:39 PM  Staff -        CRNA: Rossi Patten APRN CRNA       Performed By: CRNA  Consent for Airway        Urgency: elective  Indications and Patient Condition       Indications for airway management: andrez-procedural       Induction type:intravenous       Mask difficulty assessment: 1 - vent by mask    Final Airway Details       Final airway type: endotracheal airway       Successful airway: ETT - single and Oral  Endotracheal Airway Details        ETT size (mm): 7.0       Cuffed: yes       Successful intubation technique: direct laryngoscopy       DL Blade Type: MAC 3       Grade View of Cords: 1       Adjucts: stylet       Position: Right       Measured from: lips       Secured at (cm): 22    Post intubation assessment        Placement verified by: capnometry, equal breath sounds and chest rise        Number of attempts at approach: 1       Secured with: paper tape       Ease of procedure: easy       Dentition: Unchanged and Intact    Medication(s) Administered   Medication Administration Time: 1/31/2023 3:39 PM

## 2023-01-31 NOTE — BRIEF OP NOTE
[facility]    Brief Operative Note    Pre-operative diagnosis: Encounter for care related to feeding tube [Z46.59]  Post-operative diagnosis Same as pre-operative diagnosis    Procedure: Procedure(s):  ESOPHAGOGASTRODUODENOSCOPY (EGD) with peg replacement   Dilatation of jejunostomy feeding tube, track with placement of jejunostomy tube with fluoroscopy  Surgeon: Surgeon(s) and Role:  Panel 1:     * Mandeep Park MD - Primary  Panel 2:     * Elver Bauer MD - Primary  Anesthesia: General   Estimated Blood Loss: None    Drains: None  Specimens: * No specimens in log *  Findings:   None.  Complications: None.  Implants: * No implants in log *     Two part    1) Lizette - removal of sutured J tube, wire guided step dilation and placement of 18 F G tube deep into jejunum, taped bumper to skin no balloon or sutures  2) Xavier - EGD revealing no T tags anywhere near PEG, single Xtack embedded in antrum, not removed. Replaced G tube with bumper style pulled through mouth , left at 4cm, loose.     REC: discharge home  Resume J feeds, vent G  Follow-up virtual clinic 2 weeks Dr Park

## 2023-01-31 NOTE — ANESTHESIA CARE TRANSFER NOTE
Patient: Dinora Mcghee    Procedure: Procedure(s):  ESOPHAGOGASTRODUODENOSCOPY (EGD) with peg replacement   Dilatation of jejunostomy feeding tube, track with placement of jejunostomy tube with fluoroscopy       Diagnosis: Encounter for care related to feeding tube [Z46.59]  Diagnosis Additional Information: No value filed.    Anesthesia Type:   General     Note:    Oropharynx: oropharynx clear of all foreign objects and spontaneously breathing  Level of Consciousness: awake  Oxygen Supplementation: nasal cannula  Level of Supplemental Oxygen (L/min / FiO2): 2  Independent Airway: airway patency satisfactory and stable    Vital Signs Stable: post-procedure vital signs reviewed and stable  Report to RN Given: handoff report given  Patient transferred to: PACU    Handoff Report: Identifed the Patient, Identified the Reponsible Provider, Reviewed the pertinent medical history, Discussed the surgical course, Reviewed Intra-OP anesthesia mangement and issues during anesthesia, Set expectations for post-procedure period and Allowed opportunity for questions and acknowledgement of understanding      Vitals:  Vitals Value Taken Time   BP     Temp     Pulse 87    Resp     SpO2 100%        Electronically Signed By: NATALY Bass CRNA  January 31, 2023  4:45 PM

## 2023-01-31 NOTE — LETTER
Transition Communication Hand-off for Care Transitions to Next Level of Care Provider    Name: Dinora Mcghee  : 1965  MRN #: [id number]  Primary Care Provider: Rossi Andrade     Primary Clinic: [address] [place] [id number]     Reason for Hospitalization:  Encounter for care related to feeding tube [Z46.59]  Acute postoperative abdominal pain [G89.18, R10.9]  Admit Date/Time: 2023 11:48 AM  Discharge Date: 23  Payor Source: Payor: BCBS / Plan: [employer] [place] / Product Type: Indemnity /     Readmission Assessment Measure (MJ) Risk Score/category:     Plan of Care Goals/Milestone Events:   Patient Concerns:    Patient Goals:   Short-term to gain strength    Long-term    Medical Goals   Short-term    Long-term          Reason for Communication Hand-off Referral: Other Continuation of care    Discharge Plan:       Concern for non-adherence with plan of care:   Y/N no  Discharge Needs Assessment:  Needs    Flowsheet Row Most Recent Value   Anticipated Changes Related to Illness none   Equipment Currently Used at Home other (see comments)  [IV pole]   # of Referrals Placed by [facility] External Care Coordination          Already enrolled in Tele-monitoring program and name of program:  Yes  Follow-up specialty is recommended: Yes    Follow-up plan:    Future Appointments   Date Time Provider Department Center   2/3/2023 10:00 AM Jessica Leyva OT [facility] O   2/3/2023 11:30 AM Lynette Sequeira, PT [facility] O   2023  8:00 AM Vijaya Genao MD [facility]   3/23/2023  1:00 PM Rachelle العلي APRN [facility]   2023  1:00 PM Gloria Melissa MD [facility]       Any outstanding tests or procedures:              Key Recommendations:  Resume home care nursing for port access and tube feed teaching and cares.     Renee James, RN, BSN, PHN

## 2023-01-31 NOTE — ANESTHESIA PREPROCEDURE EVALUATION
Anesthesia Pre-Procedure Evaluation    Patient: Dinora Mcghee   MRN: [id number] : 1965        Procedure : Procedure(s):  ESOPHAGOGASTRODUODENOSCOPY (EGD)  Dilatation of jejunostomy feeding tube, track with placement of jejunostomy tube with fluoroscopy          Past Medical History:   Diagnosis Date     Allergic rhinitis, cause unspecified      Allergy to other foods     strawberries, apples, celeries, alice, watermelon     Arthritis     left knee     Choledocholithiasis     long after cholecystectomy     Chronic abdominal pain      Chronic constipation      Chronic nausea      Chronic pancreatitis (H)      Degeneration of lumbar or lumbosacral intervertebral disc      Esophageal reflux     w/ hiatal hernia     Gastroparesis      Hiatal hernia      History of pituitary adenoma     s/p resection     Hypothyroidism      Migraines      Mild hyperlipidemia      On tube feeding diet     presence of GJ tube     Pancreatic disease     PD stricture, 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 endometriosis growth     APPENDECTOMY        SECTION       COLONOSCOPY       ENDOSCOPIC INSERTION TUBE GASTROSTOMY N/A 2021    Procedure: Gastrojejonostomy placement with jejunopexy, PEG tube exchange;  Surgeon: Zackery Montoya MD;  [facility]     ENDOSCOPIC RETROGRADE CHOLANGIOPANCREATOGRAM N/A 2015    Procedure: ENDOSCOPIC RETROGRADE CHOLANGIOPANCREATOGRAM;  Surgeon: Mandeep Park MD;  [facility]     ENDOSCOPIC RETROGRADE CHOLANGIOPANCREATOGRAM N/A 2016    Procedure: COMBINED ENDOSCOPIC RETROGRADE CHOLANGIOPANCREATOGRAPHY, PLACE TUBE/STENT;  Surgeon: Mandeep Park  MD Eduardo;  [facility]     ENDOSCOPIC RETROGRADE CHOLANGIOPANCREATOGRAM N/A 3/17/2016    Procedure: COMBINED ENDOSCOPIC RETROGRADE CHOLANGIOPANCREATOGRAPHY, REMOVE FOREIGN BODY OR STENT/TUBE;  Surgeon: Mandeep Park MD;  [facility]     ENDOSCOPIC RETROGRADE CHOLANGIOPANCREATOGRAM N/A 8/2/2016    Procedure: COMBINED ENDOSCOPIC RETROGRADE CHOLANGIOPANCREATOGRAPHY, PLACE TUBE/STENT;  Surgeon: Mandeep Park MD;  [facility]     ENDOSCOPIC RETROGRADE CHOLANGIOPANCREATOGRAM N/A 8/26/2016    Procedure: COMBINED ENDOSCOPIC RETROGRADE CHOLANGIOPANCREATOGRAPHY, REMOVE FOREIGN BODY OR STENT/TUBE;  Surgeon: Mandeep Park MD;  [facility]     ENDOSCOPIC ULTRASOUND UPPER GASTROINTESTINAL TRACT (GI) N/A 10/3/2016    Procedure: ENDOSCOPIC ULTRASOUND, ESOPHAGOSCOPY / UPPER GASTROINTESTINAL TRACT (GI);  Surgeon: Guru Jose Rodas MD;  [facility]     ENTEROSCOPY SMALL BOWEL N/A 2/3/2022    Procedure: ENTEROSCOPY with possible fistula closure;  Surgeon: Francisco Dodson MD;  Location:  GI     ESOPHAGOSCOPY, GASTROSCOPY, DUODENOSCOPY (EGD), COMBINED N/A 6/24/2015    Procedure: COMBINED ESOPHAGOSCOPY, GASTROSCOPY, DUODENOSCOPY (EGD), REMOVE FOREIGN BODY;  Surgeon: Mandeep Park MD;  Location:  GI     ESOPHAGOSCOPY, GASTROSCOPY, DUODENOSCOPY (EGD), COMBINED N/A 10/25/2015    Procedure: COMBINED ESOPHAGOSCOPY, GASTROSCOPY, DUODENOSCOPY (EGD);  Surgeon: Sammy Amaro MD;  Location:  GI     ESOPHAGOSCOPY, GASTROSCOPY, DUODENOSCOPY (EGD), COMBINED N/A 10/25/2015    Procedure: COMBINED ESOPHAGOSCOPY, GASTROSCOPY, DUODENOSCOPY (EGD), BIOPSY SINGLE OR MULTIPLE;  Surgeon: Sammy Amaro MD;  Location:  GI     ESOPHAGOSCOPY, GASTROSCOPY, DUODENOSCOPY (EGD), DILATATION, COMBINED       EXCISE LESION TRUNK N/A 4/17/2017    Procedure: EXCISE LESION TRUNK;  Removal of Abdominal Foreign Body;  Surgeon: Nestor Phoenix MD;  [facility]     HC ESOPH/GAS  REFLUX TEST W NASAL IMPED >1 HR N/A 11/19/2015    Procedure: ESOPHAGEAL IMPEDENCE FUNCTION TEST WITH 24 HOUR PH GREATER THAN 1 HOUR;  Surgeon: Thiago Apple MD;  [facility]      UGI ENDOSCOPY DIAG W BIOPSY  9/17/08      UGI ENDOSCOPY DIAG W BIOPSY  9/27/12     HC UGI ENDOSCOPY W ESOPHAGEAL DILATION BALLOON <30MM  9/17/08     HC UGI ENDOSCOPY W EUS N/A 5/5/2015    Procedure: COMBINED ENDOSCOPIC ULTRASOUND, ESOPHAGOSCOPY, GASTROSCOPY, DUODENOSCOPY (EGD);  Surgeon: Wm Dueñas MD;  [facility]      WRIST ARTHROSCOP,RELEASE XVERS LIG Bilateral 12/17/08     INJECT TRANSVERSUS ABDOMINIS PLANE (TAP) BLOCK BILATERAL Left 9/22/2016    Procedure: INJECT TRANSVERSUS ABDOMINIS PLANE (TAP) BLOCK BILATERAL;  Surgeon: Dickson Corrigan MD;  [facility]     INJECT TRIGGER POINT Bilateral 9/8/2022    Procedure: Abdominal trigger point injection with ultrasound;  Surgeon: Monika Mahajan MD;  [facility]     INJECT TRIGGER POINT SINGLE / MULTIPLE 3 OR MORE MUSCLES Right 11/15/2022    Procedure: Trigger point injections right abdomen with ultrasound guidance;  Surgeon: Monika Mahajan MD;  [facility]: [facility]     IR CHEST PORT PLACEMENT < 5 YRS OF AGE  6/10/2022     laparoscopic pineda  1995     LAPAROSCOPIC HERNIORRHAPHY INCISIONAL N/A 8/23/2018    Procedure: LAPAROSCOPIC HERNIORRHAPHY INCISIONAL;  Laparoscopic Incisional Hernia Repair with Symbotex Mesh Implant;  Surgeon: Nestor Phoenix MD;  [facility]     LAPAROSCOPIC PANCREATECTOMY, TRANSPLANT AUTO ISLET CELL N/A 12/28/2016    Procedure: LAPAROSCOPIC PANCREATECTOMY, TRANSPLANT AUTO ISLET CELL;  Surgeon: Nestor Phoenix MD;  [facility]     REMOVE AND REPLACE BREAST IMPLANT PROSTHESIS N/A 12/30/2022    Procedure: Exploratory Laparotomy, lysis of adhesions, small bowel resection. Placement of gastric jejunostomy for enteral feeding.;  Surgeon: Elver Bauer MD;  [facility]     REMOVE GASTROSTOMY TUBE ADULT N/A 1/28/2022     Procedure: REMOVAL, GASTROSTOMY TUBE, ADULT;  Surgeon: Mandeep Park MD;  [facility]     REPLACE GASTROJEJUNOSTOMY TUBE, PERCUTANEOUS N/A 2021    Procedure: GASTROJEJUNOSTOMY TUBE PLACEMENT, PERCUTANEOUS, WITH GASTROPEXY;  Surgeon: Mandeep Park MD;  [facility]     REPLACE GASTROJEJUNOSTOMY TUBE, PERCUTANEOUS N/A 2021    Procedure: REPLACEMENT, GASTROJEJUNOSTOMY TUBE, PERCUTANEOUS;  Surgeon: Zackery Montoya MD;  [facility]     REPLACE GASTROJEJUNOSTOMY TUBE, PERCUTANEOUS N/A 2022    Procedure: REPLACEMENT, GASTROJEJUNOSTOMY TUBE, PERCUTANEOUS;  Surgeon: Mandeep Park MD;  [facility]     REPLACE GASTROJEJUNOSTOMY TUBE, PERCUTANEOUS N/A 2022    Procedure: REPLACEMENT, GASTROJEJUNOSTOMY TUBE, PERCUTANEOUS with ENDOSCOPIC SUTURING.;  Surgeon: Mandeep Park MD;  [facility]     REPLACE JEJUNOSTOMY TUBE, PERCUTANEOUS N/A 9/10/2021    Procedure: UPPER ENDOSCOPY, REPLACEMENT OF PERCUTANEOUS GASTROJEJUNOSTOMY TUBE, T-TAG GASTROPEXY;  Surgeon: Zackery Montoya MD;  [facility]     REPLACE JEJUNOSTOMY TUBE, PERCUTANEOUS N/A 2021    Procedure: REPLACEMENT, JEJUNOSTOMY TUBE, PERCUTANEOUS;  Surgeon: Mandeep Park MD;  [facility]     transphenoidal pituitary resection       [facility]  DELIVERY ONLY       [facility]  DELIVERY ONLY      repeat c section with incidental cystotomy with repair     [facility] EXCIS PITUITARY,TRANSNASAL/SEPTAL      pituitary tumor removed for diabetes insipidus     [facility] TOTAL ABDOM HYSTERECTOMY      w/ bilateral salpingoophorectomy       Allergies   Allergen Reactions     Apple Anaphylaxis     Corticosteroids Other (See Comments)     All oral, IV and injectable steroids are contraindicated (unless in life threatening situations) in [employer] [employer] transplant recipients. They can cause irreversible loss of islet cell function. Please contact patient's transplant care  coordinator ADI Gaffney RN at [phone]/pager [phone] and/or endocrinologist prior to administration.       Depakote [Divalproex Sodium] Other (See Comments)     Chest pain     Zithromax [Azithromycin Dihydrate] Anaphylaxis     Noted in 08 ER     Bromfenac      Other reaction(s): Headache     Codeine      Other reaction(s): Hallucinations     Darvocet [Propoxyphene N-Apap] Itching     Morphine Nausea and Vomiting and Rash     Nalbuphine Hcl Rash     RASH :nubaine     Zosyn [Piperacillin-Tazobactam In D5w] Rash     Possible allergy, did have a diffuse rash that seemed drug related but could have also been related to soap in the hospital.      Bactrim [Sulfamethoxazole W-Trimethoprim] Other (See Comments) and Nausea and Vomiting     Severely low liver function.     Hmg-Coa-R Inhibitors Other (See Comments)     Previous liver issues, risks vs benefits felt to not warrant statin.  Discussed Oct 2022 visit     Tape [Adhesive Tape] Blisters     Tramadol      Zofran [Ondansetron] Other (See Comments)     migraine     Flagyl [Metronidazole] Hives and Rash      Social History     Tobacco Use     Smoking status: Former     Packs/day: 0.50     Years: 6.00     Pack years: 3.00     Types: Cigarettes     Start date: 1985     Quit date: 1992     Years since quittin.1     Smokeless tobacco: Never     Tobacco comments:     no 2nd hand   Substance Use Topics     Alcohol use: Not Currently     Alcohol/week: 3.0 - 6.0 standard drinks     Types: 1 - 2 Glasses of wine, 1 - 2 Cans of beer, 1 - 2 Shots of liquor per week     Comment: none since IGG      Wt Readings from Last 1 Encounters:   23 60.6 kg (133 lb 9.6 oz)        Anesthesia Evaluation   Pt has had prior anesthetic. Type: General.    History of anesthetic complications  - PONV.  (responds well to scopolamine patch).    ROS/MED HX  ENT/Pulmonary: Comment: 3 pk yr hx, quit     (+) allergic rhinitis, tobacco use, Past use,  (-) asthma and sleep  "apnea   Neurologic:     (+) migraines,  (-) no seizures and no CVA   Cardiovascular:     (+) -----Previous cardiac testing   Echo: Date: Results:    Stress Test: Date: Results:    ECG Reviewed: Date: 8/7/21 Results:  Sinus rhythm  Cannot rule out Anterior infarct , age undetermined  Abnormal ECG   Ventricular rate 66 bpm     Cath: Date: Results:      METS/Exercise Tolerance: 4 - Raking leaves, gardening Comment: Endorses SOB when carrying laundry upstairs. Denies CP. +Fatigue, less stamina 2/2 malnutrition     Hematologic:  - neg hematologic  ROS  (-) history of blood clots and history of blood transfusion   Musculoskeletal: Comment: lumbago, chrondromalacia, stiff shoulder  - neg musculoskeletal ROS     GI/Hepatic: Comment: Hx hepatic dome lesion/IgG4 pseudotumor and elevated LFT's of unclear etiology    Chronic pancreatitis    Severe gastroparesis    Malnutrition, wt loss  Hiatal hernia      (+) GERD, Asymptomatic on medication, hiatal hernia, cholecystitis/cholelithiasis,     Renal/Genitourinary:  - neg Renal ROS     Endo: Comment: post-pancreatectomy diabetes; pancreatic insufficiency.     (+) type I DM, thyroid problem, hypothyroidism,     Psychiatric/Substance Use:  - neg psychiatric ROS     Infectious Disease:  - neg infectious disease ROS     Malignancy:  - neg malignancy ROS (+) Malignancy (pituitary adenoma), History of Neuro.    Other:  - neg other ROS          Physical Exam    Airway        Mallampati: I   TM distance: > 3 FB   Neck ROM: full   Mouth opening: > 3 cm    Respiratory Devices and Support         Dental           Cardiovascular   cardiovascular exam normal          Pulmonary   pulmonary exam normal              Blood pressure 122/87, pulse 97, temperature 37.4  C (99.3  F), temperature source Oral, resp. rate 16, height 1.702 m (5' 7\"), weight 60.6 kg (133 lb 9.6 oz), SpO2 99 %, not currently breastfeeding.    Facility-Administered Medications Prior to Admission   Medication Dose Route " Frequency Provider Last Rate Last Admin     Botulinum Toxin Type A (BOTOX) 200 units injection 155 Units  155 Units Intramuscular See Admin Instructions Rachelle العلي, NATALY CNP   155 Units at 12/19/22 1807     Medications Prior to Admission   Medication Sig Dispense Refill Last Dose     acarbose (PRECOSE) 100 MG tablet Take 1 tablet (100 mg) by mouth 3 times daily (with meals) 90 tablet 3 1/30/2023 at 1700     acetaminophen (TYLENOL) 160 MG/5ML solution Take 20.312 mLs (650 mg) by mouth every 6 hours as needed for fever or mild pain 473 mL 3 1/31/2023 at 0300     amylase-lipase-protease (CREON) 87964-99955 units CPEP per EC capsule Take 3-4 capsules by mouth 3 times daily (with meals) With oral meals 150 capsule 11 1/30/2023 at 0600     cyclobenzaprine (FLEXERIL) 10 MG tablet Take 1 tablet (10 mg) by mouth 2 times daily as needed for muscle spasms 90 tablet 3 1/30/2023 at 0900     diphenhydrAMINE (BENADRYL ALLERGY) 25 MG capsule Take 1 capsule (25 mg) by mouth every 6 hours as needed for itching or allergies 56 capsule 0 1/30/2023 at 0900     estradiol (VAGIFEM) 10 MCG TABS vaginal tablet INSERT 1 TABLET(10 MCG) VAGINALLY 2 TIMES A WEEK 24 tablet 2 Past Week     famotidine (PEPCID) 40 MG/5ML suspension 2.5 mLs (20 mg) by Per J Tube route daily 50 mL 3 1/30/2023 at 0700     glucagon 1 MG kit Give 0.1 to 0.15mg( 10-15 units on Insulin sryinge) subcutaneous  every 15 minutes PRN for hypoglycemia. Remix new kit q24hr. Needs up to 3 kit/week. 10 each 3 Past Week     ibuprofen (ADVIL/MOTRIN) 400 MG tablet Take 1 tablet (400 mg) by mouth every 6 hours as needed for moderate pain 30 tablet 0 Past Month     levothyroxine 20 mcg/mL (THYQUIDITY) 20 mcg/mL SOLN oral solution 6.85 mLs (137 mcg) by Per J Tube route daily 100 mL 1 1/31/2023 at 0700     Lidocaine (LIDOCARE) 4 % Patch Place 1 patch onto the skin every 24 hours Apply patch to abdomen once daily as needed. Remove after 12 hours. To prevent lidocaine  toxicity, should be patch free for 12 hrs daily. 15 patch 0 1/31/2023 at 0700     methocarbamol (ROBAXIN) 500 MG tablet 1 tablet (500 mg) by Per Feeding Tube route 4 times daily as needed for muscle spasms 50 tablet 0 1/31/2023 at 0700     Multiple Vitamin (MULTIVITAMIN ADULT PO) Take 1 tablet by mouth daily   Past Week     omeprazole (FIRST-OMEPRAZOLE) 2 MG/ML SUSP 20 mLs (40 mg) by Per J Tube route every morning (before breakfast) 300 mL 11 1/30/2023 at 2100     oxyCODONE (ROXICODONE) 5 MG/5ML solution 5-10 mLs (5-10 mg) by Per J Tube route every 4 hours as needed for moderate pain (4-6) 100 mL 0 Past Week     prochlorperazine (COMPAZINE) 10 MG tablet TAKE 1/2 TABLET(5 MG) BY MOUTH EVERY 6 HOURS AS NEEDED FOR NAUSEA OR VOMITING 60 tablet 2 1/31/2023 at 0700     promethazine (PHENERGAN) 25 MG tablet Take 25 mg by mouth At Bedtime   Past Week     promethazine (PHENERGAN) 6.25 MG/5ML syrup 20 mLs (25 mg) by Per Feeding Tube route nightly as needed for nausea 473 mL 3 1/30/2023 at 2100     scopolamine (TRANSDERM) 1 MG/3DAYS 72 hr patch Place 1 patch onto the skin every 72 hours 10 patch 11 1/31/2023 at 1255     sennosides (SENOKOT) 8.8 MG/5ML syrup 5 mLs by Per J Tube route 2 times daily 236 mL 0 1/30/2023 at 2100     ZOLMitriptan (ZOMIG-ZMT) 5 MG ODT Take 1 tablet (5 mg) by mouth at onset of headache for migraine May repeat in 2 hours. Max 2 tablets/24 hours. 18 tablet 6 Past Week     acetaminophen (TYLENOL) 160 MG/5ML suspension Take 15 mg/kg by mouth every 6 hours as needed for fever or mild pain        acetaminophen (TYLENOL) 325 MG/10.15ML solution 20.3 mLs (650 mg) by Per J Tube route every 6 hours as needed for mild pain or fever 473 mL 0      blood glucose (PAT CONTOUR NEXT) test strip Use to test blood sugar 6 times daily or as directed. 2 Box 11      blood glucose monitoring (PAT MICROLET) lancets Use to test blood sugar 6-8 times daily or as directed. 100 each 11      caffeine citrate (CAFCIT) 20 MG/ML  "solution 16 mLs (320 mg) by Per J Tube route daily 500 mL 0      Continuous Blood Gluc Sensor (FREESTYLE LISA 2 SENSOR) MISC Inject 1 patch into the skin every 14 days 2 each 11      glucose 40 % GEL gel Take 15-30 g by mouth every 15 minutes as needed for low blood sugar 3 Tube 2      Hydroactive Dressings (TEGADERM HYDROCOLLOID THIN) MISC Use under sensor site , change every 14 days 2 each 11      insulin syringe-needle U-100 (30G X 1/2\" 0.3 ML) 30G X 1/2\" 0.3 ML miscellaneous Use 3 syringes daily or as directed. 50 each 1      methocarbamol (ROBAXIN) 750 MG tablet Take 1 tablet (750 mg) by mouth 4 times daily 120 tablet 0      Nutritional Supplements (BOOST HIGH PROTEIN) LIQD After above baseline labs are drawn, give: 6 mL/kg to maximum of 360 mL; the beverage is to be consumed within 5 minutes.  0      order for DME Equipment being ordered:Cefaly 1 Device 0      Prucalopride Succinate 2 MG TABS Take 1 tablet (2 mg) by mouth daily 30 tablet 11      Sharps Container (BD SHARPS ) MISC 1 Container as needed 1 each 1      STATIN NOT PRESCRIBED (INTENTIONAL) Previous liver issues, risks vs benefits felt to not warrant statin.    Discussed Oct 2022 visit          .lab  OUTSIDE LABS:  CBC:   Lab Results   Component Value Date    WBC 6.8 01/04/2023    WBC 6.7 01/03/2023    HGB 12.5 01/04/2023    HGB 12.7 01/03/2023    HCT 38.5 01/04/2023    HCT 40.3 01/03/2023     01/04/2023     01/03/2023     BMP:   Lab Results   Component Value Date     01/05/2023     01/04/2023    POTASSIUM 4.2 01/05/2023    POTASSIUM 4.1 01/04/2023    CHLORIDE 100 01/05/2023    CHLORIDE 100 01/04/2023    CO2 26 01/05/2023    CO2 25 01/04/2023    BUN 8.2 01/05/2023    BUN 7.9 01/04/2023    CR 0.57 01/05/2023    CR 0.53 01/04/2023    GLC 90 01/31/2023     (H) 01/08/2023     COAGS:   Lab Results   Component Value Date    PTT 29 01/07/2017    INR 1.03 06/10/2022    FIBR 225 12/28/2016     POC:   Lab Results " "  Component Value Date    BGM 85 08/24/2018    HCG Negative 12/19/2016     HEPATIC:   Lab Results   Component Value Date    ALBUMIN 4.2 02/17/2022    PROTTOTAL 7.8 02/17/2022    ALT 31 02/17/2022    AST 23 02/17/2022    ALKPHOS 98 02/17/2022    BILITOTAL 0.9 02/17/2022     OTHER:   Lab Results   Component Value Date    PH 7.49 (H) 12/28/2016    LACT 0.9 12/03/2021    A1C 5.4 02/17/2022    KASSI 9.4 01/05/2023    PHOS 4.3 01/05/2023    MAG 1.7 01/09/2023    LIPASE <10 (L) 12/03/2021    AMYLASE 45 01/23/2017    TSH 0.67 09/16/2021    T4 1.13 03/23/2018    CRP 90.0 (H) 12/29/2016    SED 10 09/16/2021   Blood pressure 122/87, pulse 97, temperature 37.4  C (99.3  F), temperature source Oral, resp. rate 16, height 1.702 m (5' 7\"), weight 60.6 kg (133 lb 9.6 oz), SpO2 99 %, not currently breastfeeding.    Facility-Administered Medications Prior to Admission   Medication Dose Route Frequency Provider Last Rate Last Admin     Botulinum Toxin Type A (BOTOX) 200 units injection 155 Units  155 Units Intramuscular See Admin Instructions Rachelle العلي APRN CNP   155 Units at 12/19/22 1807     Medications Prior to Admission   Medication Sig Dispense Refill Last Dose     acarbose (PRECOSE) 100 MG tablet Take 1 tablet (100 mg) by mouth 3 times daily (with meals) 90 tablet 3 1/30/2023 at 1700     acetaminophen (TYLENOL) 160 MG/5ML solution Take 20.312 mLs (650 mg) by mouth every 6 hours as needed for fever or mild pain 473 mL 3 1/31/2023 at 0300     amylase-lipase-protease (CREON) 17744-46164 units CPEP per EC capsule Take 3-4 capsules by mouth 3 times daily (with meals) With oral meals 150 capsule 11 1/30/2023 at 0600     cyclobenzaprine (FLEXERIL) 10 MG tablet Take 1 tablet (10 mg) by mouth 2 times daily as needed for muscle spasms 90 tablet 3 1/30/2023 at 0900     diphenhydrAMINE (BENADRYL ALLERGY) 25 MG capsule Take 1 capsule (25 mg) by mouth every 6 hours as needed for itching or allergies 56 capsule 0 1/30/2023 " at 0900     estradiol (VAGIFEM) 10 MCG TABS vaginal tablet INSERT 1 TABLET(10 MCG) VAGINALLY 2 TIMES A WEEK 24 tablet 2 Past Week     famotidine (PEPCID) 40 MG/5ML suspension 2.5 mLs (20 mg) by Per J Tube route daily 50 mL 3 1/30/2023 at 0700     glucagon 1 MG kit Give 0.1 to 0.15mg( 10-15 units on Insulin sryinge) subcutaneous  every 15 minutes PRN for hypoglycemia. Remix new kit q24hr. Needs up to 3 kit/week. 10 each 3 Past Week     ibuprofen (ADVIL/MOTRIN) 400 MG tablet Take 1 tablet (400 mg) by mouth every 6 hours as needed for moderate pain 30 tablet 0 Past Month     levothyroxine 20 mcg/mL (THYQUIDITY) 20 mcg/mL SOLN oral solution 6.85 mLs (137 mcg) by Per J Tube route daily 100 mL 1 1/31/2023 at 0700     Lidocaine (LIDOCARE) 4 % Patch Place 1 patch onto the skin every 24 hours Apply patch to abdomen once daily as needed. Remove after 12 hours. To prevent lidocaine toxicity, should be patch free for 12 hrs daily. 15 patch 0 1/31/2023 at 0700     methocarbamol (ROBAXIN) 500 MG tablet 1 tablet (500 mg) by Per Feeding Tube route 4 times daily as needed for muscle spasms 50 tablet 0 1/31/2023 at 0700     Multiple Vitamin (MULTIVITAMIN ADULT PO) Take 1 tablet by mouth daily   Past Week     omeprazole (FIRST-OMEPRAZOLE) 2 MG/ML SUSP 20 mLs (40 mg) by Per J Tube route every morning (before breakfast) 300 mL 11 1/30/2023 at 2100     oxyCODONE (ROXICODONE) 5 MG/5ML solution 5-10 mLs (5-10 mg) by Per J Tube route every 4 hours as needed for moderate pain (4-6) 100 mL 0 Past Week     prochlorperazine (COMPAZINE) 10 MG tablet TAKE 1/2 TABLET(5 MG) BY MOUTH EVERY 6 HOURS AS NEEDED FOR NAUSEA OR VOMITING 60 tablet 2 1/31/2023 at 0700     promethazine (PHENERGAN) 25 MG tablet Take 25 mg by mouth At Bedtime   Past Week     promethazine (PHENERGAN) 6.25 MG/5ML syrup 20 mLs (25 mg) by Per Feeding Tube route nightly as needed for nausea 473 mL 3 1/30/2023 at 2100     scopolamine (TRANSDERM) 1 MG/3DAYS 72 hr patch Place 1 patch  "onto the skin every 72 hours 10 patch 11 1/31/2023 at 1255     sennosides (SENOKOT) 8.8 MG/5ML syrup 5 mLs by Per J Tube route 2 times daily 236 mL 0 1/30/2023 at 2100     ZOLMitriptan (ZOMIG-ZMT) 5 MG ODT Take 1 tablet (5 mg) by mouth at onset of headache for migraine May repeat in 2 hours. Max 2 tablets/24 hours. 18 tablet 6 Past Week     acetaminophen (TYLENOL) 160 MG/5ML suspension Take 15 mg/kg by mouth every 6 hours as needed for fever or mild pain        acetaminophen (TYLENOL) 325 MG/10.15ML solution 20.3 mLs (650 mg) by Per J Tube route every 6 hours as needed for mild pain or fever 473 mL 0      blood glucose (PAT CONTOUR NEXT) test strip Use to test blood sugar 6 times daily or as directed. 2 Box 11      blood glucose monitoring (PAT MICROLET) lancets Use to test blood sugar 6-8 times daily or as directed. 100 each 11      caffeine citrate (CAFCIT) 20 MG/ML solution 16 mLs (320 mg) by Per J Tube route daily 500 mL 0      Continuous Blood Gluc Sensor (FREESTYLE LISA 2 SENSOR) MISC Inject 1 patch into the skin every 14 days 2 each 11      glucose 40 % GEL gel Take 15-30 g by mouth every 15 minutes as needed for low blood sugar 3 Tube 2      Hydroactive Dressings (TEGADERM HYDROCOLLOID THIN) MISC Use under sensor site , change every 14 days 2 each 11      insulin syringe-needle U-100 (30G X 1/2\" 0.3 ML) 30G X 1/2\" 0.3 ML miscellaneous Use 3 syringes daily or as directed. 50 each 1      methocarbamol (ROBAXIN) 750 MG tablet Take 1 tablet (750 mg) by mouth 4 times daily 120 tablet 0      Nutritional Supplements (BOOST HIGH PROTEIN) LIQD After above baseline labs are drawn, give: 6 mL/kg to maximum of 360 mL; the beverage is to be consumed within 5 minutes.  0      order for DME Equipment being ordered:Cefaly 1 Device 0      Prucalopride Succinate 2 MG TABS Take 1 tablet (2 mg) by mouth daily 30 tablet 11      Sharps Container (BD SHARPS ) MISC 1 Container as needed 1 each 1      STATIN NOT " PRESCRIBED (INTENTIONAL) Previous liver issues, risks vs benefits felt to not warrant statin.    Discussed Oct 2022 visit            Anesthesia Plan    ASA Status:  3   NPO Status:  NPO Appropriate    Anesthesia Type: General.     - Airway: ETT   Induction: RSI.   Maintenance: TIVA.   Techniques and Equipment:     - Lines/Monitors: Port in situ     Consents    Anesthesia Plan(s) and associated risks, benefits, and realistic alternatives discussed. Questions answered and patient/representative(s) expressed understanding.    - Discussed:     - Discussed with:  Patient      - Extended Intubation/Ventilatory Support Discussed: No.      - Patient is DNR/DNI Status: No    Use of blood products discussed: No .     Postoperative Care    Pain management: Multi-modal analgesia.   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  Pt allergic to zofran.  Requests phenergan and benadryl for PONV prevention.  25 mg benadryl and 25 mg phenergan given previously with good success.            Debbie Cardozo MD

## 2023-02-01 ENCOUNTER — APPOINTMENT (OUTPATIENT)
Dept: PHYSICAL THERAPY | Facility: CLINIC | Age: 58
End: 2023-02-01
Attending: INTERNAL MEDICINE
Payer: COMMERCIAL

## 2023-02-01 LAB
ANION GAP SERPL CALCULATED.3IONS-SCNC: 9 MMOL/L (ref 7–15)
BUN SERPL-MCNC: 7.2 MG/DL (ref 6–20)
CALCIUM SERPL-MCNC: 8.9 MG/DL (ref 8.6–10)
CHLORIDE SERPL-SCNC: 100 MMOL/L (ref 98–107)
CREAT SERPL-MCNC: 0.53 MG/DL (ref 0.51–0.95)
DEPRECATED HCO3 PLAS-SCNC: 29 MMOL/L (ref 22–29)
ERYTHROCYTE [DISTWIDTH] IN BLOOD BY AUTOMATED COUNT: 13.3 % (ref 10–15)
GFR SERPL CREATININE-BSD FRML MDRD: >90 ML/MIN/1.73M2
GLUCOSE BLDC GLUCOMTR-MCNC: 105 MG/DL (ref 70–99)
GLUCOSE BLDC GLUCOMTR-MCNC: 116 MG/DL (ref 70–99)
GLUCOSE BLDC GLUCOMTR-MCNC: 116 MG/DL (ref 70–99)
GLUCOSE BLDC GLUCOMTR-MCNC: 135 MG/DL (ref 70–99)
GLUCOSE BLDC GLUCOMTR-MCNC: 144 MG/DL (ref 70–99)
GLUCOSE BLDC GLUCOMTR-MCNC: 97 MG/DL (ref 70–99)
GLUCOSE SERPL-MCNC: 122 MG/DL (ref 70–99)
HCT VFR BLD AUTO: 36.8 % (ref 35–47)
HGB BLD-MCNC: 12.2 G/DL (ref 11.7–15.7)
MAGNESIUM SERPL-MCNC: 1.3 MG/DL (ref 1.7–2.3)
MAGNESIUM SERPL-MCNC: 1.9 MG/DL (ref 1.7–2.3)
MCH RBC QN AUTO: 32 PG (ref 26.5–33)
MCHC RBC AUTO-ENTMCNC: 33.2 G/DL (ref 31.5–36.5)
MCV RBC AUTO: 97 FL (ref 78–100)
PHOSPHATE SERPL-MCNC: 3.9 MG/DL (ref 2.5–4.5)
PLATELET # BLD AUTO: 337 10E3/UL (ref 150–450)
POTASSIUM SERPL-SCNC: 4.1 MMOL/L (ref 3.4–5.3)
RBC # BLD AUTO: 3.81 10E6/UL (ref 3.8–5.2)
SODIUM SERPL-SCNC: 138 MMOL/L (ref 136–145)
WBC # BLD AUTO: 15.3 10E3/UL (ref 4–11)

## 2023-02-01 PROCEDURE — 250N000011 HC RX IP 250 OP 636: Performed by: SURGERY

## 2023-02-01 PROCEDURE — 84100 ASSAY OF PHOSPHORUS: CPT | Performed by: SURGERY

## 2023-02-01 PROCEDURE — 36591 DRAW BLOOD OFF VENOUS DEVICE: CPT | Performed by: SURGERY

## 2023-02-01 PROCEDURE — 250N000012 HC RX MED GY IP 250 OP 636 PS 637: Performed by: STUDENT IN AN ORGANIZED HEALTH CARE EDUCATION/TRAINING PROGRAM

## 2023-02-01 PROCEDURE — 250N000011 HC RX IP 250 OP 636: Performed by: ANESTHESIOLOGY

## 2023-02-01 PROCEDURE — 250N000011 HC RX IP 250 OP 636

## 2023-02-01 PROCEDURE — 83735 ASSAY OF MAGNESIUM: CPT | Performed by: SURGERY

## 2023-02-01 PROCEDURE — 120N000002 HC R&B MED SURG/OB UMMC

## 2023-02-01 PROCEDURE — 85027 COMPLETE CBC AUTOMATED: CPT | Performed by: SURGERY

## 2023-02-01 PROCEDURE — 97530 THERAPEUTIC ACTIVITIES: CPT | Mod: GP

## 2023-02-01 PROCEDURE — 258N000003 HC RX IP 258 OP 636: Performed by: SURGERY

## 2023-02-01 PROCEDURE — 250N000011 HC RX IP 250 OP 636: Performed by: STUDENT IN AN ORGANIZED HEALTH CARE EDUCATION/TRAINING PROGRAM

## 2023-02-01 PROCEDURE — 250N000013 HC RX MED GY IP 250 OP 250 PS 637: Performed by: SURGERY

## 2023-02-01 PROCEDURE — 80048 BASIC METABOLIC PNL TOTAL CA: CPT | Performed by: SURGERY

## 2023-02-01 PROCEDURE — 97162 PT EVAL MOD COMPLEX 30 MIN: CPT | Mod: GP

## 2023-02-01 RX ORDER — LEVOTHYROXINE SODIUM 137 UG/1
137 TABLET ORAL
Status: DISCONTINUED | OUTPATIENT
Start: 2023-02-01 | End: 2023-02-05 | Stop reason: HOSPADM

## 2023-02-01 RX ORDER — DEXTROSE MONOHYDRATE 25 G/50ML
25-50 INJECTION, SOLUTION INTRAVENOUS
Status: DISCONTINUED | OUTPATIENT
Start: 2023-02-01 | End: 2023-02-05 | Stop reason: HOSPADM

## 2023-02-01 RX ORDER — ONDANSETRON 4 MG/1
4 TABLET, ORALLY DISINTEGRATING ORAL EVERY 6 HOURS PRN
Status: DISCONTINUED | OUTPATIENT
Start: 2023-02-01 | End: 2023-02-05 | Stop reason: HOSPADM

## 2023-02-01 RX ORDER — METHOCARBAMOL 100 MG/ML
500 INJECTION, SOLUTION INTRAMUSCULAR; INTRAVENOUS 4 TIMES DAILY PRN
Status: DISPENSED | OUTPATIENT
Start: 2023-02-01 | End: 2023-02-04

## 2023-02-01 RX ORDER — LIDOCAINE 40 MG/G
CREAM TOPICAL
Status: DISCONTINUED | OUTPATIENT
Start: 2023-02-01 | End: 2023-02-05 | Stop reason: HOSPADM

## 2023-02-01 RX ORDER — ERTAPENEM 1 G/1
1 INJECTION, POWDER, LYOPHILIZED, FOR SOLUTION INTRAMUSCULAR; INTRAVENOUS EVERY 24 HOURS
Status: COMPLETED | OUTPATIENT
Start: 2023-02-01 | End: 2023-02-02

## 2023-02-01 RX ORDER — METHOCARBAMOL 100 MG/ML
500 INJECTION, SOLUTION INTRAMUSCULAR; INTRAVENOUS EVERY 6 HOURS
Status: DISCONTINUED | OUTPATIENT
Start: 2023-02-01 | End: 2023-02-01

## 2023-02-01 RX ORDER — NALOXONE HYDROCHLORIDE 0.4 MG/ML
0.4 INJECTION, SOLUTION INTRAMUSCULAR; INTRAVENOUS; SUBCUTANEOUS
Status: DISCONTINUED | OUTPATIENT
Start: 2023-02-01 | End: 2023-02-05 | Stop reason: HOSPADM

## 2023-02-01 RX ORDER — NALOXONE HYDROCHLORIDE 0.4 MG/ML
0.2 INJECTION, SOLUTION INTRAMUSCULAR; INTRAVENOUS; SUBCUTANEOUS
Status: DISCONTINUED | OUTPATIENT
Start: 2023-02-01 | End: 2023-02-05 | Stop reason: HOSPADM

## 2023-02-01 RX ORDER — ENOXAPARIN SODIUM 100 MG/ML
40 INJECTION SUBCUTANEOUS EVERY 24 HOURS
Status: DISCONTINUED | OUTPATIENT
Start: 2023-02-01 | End: 2023-02-05 | Stop reason: HOSPADM

## 2023-02-01 RX ORDER — ONDANSETRON 2 MG/ML
4 INJECTION INTRAMUSCULAR; INTRAVENOUS EVERY 6 HOURS PRN
Status: DISCONTINUED | OUTPATIENT
Start: 2023-02-01 | End: 2023-02-01 | Stop reason: DRUGHIGH

## 2023-02-01 RX ORDER — SUMATRIPTAN 6 MG/.5ML
6 INJECTION, SOLUTION SUBCUTANEOUS EVERY 12 HOURS PRN
Status: DISCONTINUED | OUTPATIENT
Start: 2023-02-01 | End: 2023-02-05 | Stop reason: HOSPADM

## 2023-02-01 RX ORDER — PROCHLORPERAZINE MALEATE 5 MG
10 TABLET ORAL EVERY 6 HOURS PRN
Status: DISCONTINUED | OUTPATIENT
Start: 2023-02-01 | End: 2023-02-05 | Stop reason: HOSPADM

## 2023-02-01 RX ORDER — ACETAMINOPHEN 325 MG/10.15ML
650 LIQUID ORAL EVERY 6 HOURS PRN
Status: DISCONTINUED | OUTPATIENT
Start: 2023-02-01 | End: 2023-02-02

## 2023-02-01 RX ORDER — NICOTINE POLACRILEX 4 MG
15-30 LOZENGE BUCCAL
Status: DISCONTINUED | OUTPATIENT
Start: 2023-02-01 | End: 2023-02-05 | Stop reason: HOSPADM

## 2023-02-01 RX ORDER — ONDANSETRON 4 MG/1
4 TABLET, ORALLY DISINTEGRATING ORAL EVERY 6 HOURS PRN
Status: DISCONTINUED | OUTPATIENT
Start: 2023-02-01 | End: 2023-02-01 | Stop reason: DRUGHIGH

## 2023-02-01 RX ORDER — MAGNESIUM SULFATE HEPTAHYDRATE 40 MG/ML
4 INJECTION, SOLUTION INTRAVENOUS ONCE
Status: COMPLETED | OUTPATIENT
Start: 2023-02-01 | End: 2023-02-01

## 2023-02-01 RX ORDER — FAMOTIDINE 40 MG/5ML
20 POWDER, FOR SUSPENSION ORAL DAILY
Status: DISCONTINUED | OUTPATIENT
Start: 2023-02-01 | End: 2023-02-05 | Stop reason: HOSPADM

## 2023-02-01 RX ORDER — ONDANSETRON 2 MG/ML
4 INJECTION INTRAMUSCULAR; INTRAVENOUS EVERY 6 HOURS PRN
Status: DISCONTINUED | OUTPATIENT
Start: 2023-02-01 | End: 2023-02-05 | Stop reason: HOSPADM

## 2023-02-01 RX ADMIN — FENTANYL CITRATE 25 MCG: 50 INJECTION, SOLUTION INTRAMUSCULAR; INTRAVENOUS at 00:03

## 2023-02-01 RX ADMIN — SUMATRIPTAN 6 MG: 6 INJECTION, SOLUTION SUBCUTANEOUS at 11:51

## 2023-02-01 RX ADMIN — Medication: at 01:04

## 2023-02-01 RX ADMIN — ERTAPENEM SODIUM 1 G: 1 INJECTION, POWDER, LYOPHILIZED, FOR SOLUTION INTRAMUSCULAR; INTRAVENOUS at 19:18

## 2023-02-01 RX ADMIN — PROCHLORPERAZINE EDISYLATE 10 MG: 5 INJECTION INTRAMUSCULAR; INTRAVENOUS at 16:51

## 2023-02-01 RX ADMIN — FAMOTIDINE 20 MG: 40 POWDER, FOR SUSPENSION ORAL at 12:11

## 2023-02-01 RX ADMIN — ENOXAPARIN SODIUM 40 MG: 40 INJECTION SUBCUTANEOUS at 13:05

## 2023-02-01 RX ADMIN — PROMETHAZINE HYDROCHLORIDE 6.25 MG: 25 INJECTION INTRAMUSCULAR; INTRAVENOUS at 20:16

## 2023-02-01 RX ADMIN — ACETAMINOPHEN 650 MG: 325 SOLUTION ORAL at 22:08

## 2023-02-01 RX ADMIN — MAGNESIUM SULFATE IN WATER 4 G: 40 INJECTION, SOLUTION INTRAVENOUS at 13:05

## 2023-02-01 RX ADMIN — METHOCARBAMOL 500 MG: 100 INJECTION, SOLUTION INTRAMUSCULAR; INTRAVENOUS at 11:51

## 2023-02-01 RX ADMIN — SODIUM CHLORIDE, POTASSIUM CHLORIDE, SODIUM LACTATE AND CALCIUM CHLORIDE: 600; 310; 30; 20 INJECTION, SOLUTION INTRAVENOUS at 15:05

## 2023-02-01 RX ADMIN — FENTANYL CITRATE 25 MCG: 50 INJECTION, SOLUTION INTRAMUSCULAR; INTRAVENOUS at 00:56

## 2023-02-01 RX ADMIN — LEVOTHYROXINE SODIUM 137 MCG: 0.14 TABLET ORAL at 12:10

## 2023-02-01 ASSESSMENT — ACTIVITIES OF DAILY LIVING (ADL)
ADLS_ACUITY_SCORE: 39
ADLS_ACUITY_SCORE: 47
ADLS_ACUITY_SCORE: 39
ADLS_ACUITY_SCORE: 40
ADLS_ACUITY_SCORE: 39
TOILETING_ISSUES: NO
CHANGE_IN_FUNCTIONAL_STATUS_SINCE_ONSET_OF_CURRENT_ILLNESS/INJURY: NO
FALL_HISTORY_WITHIN_LAST_SIX_MONTHS: NO
EATING: 0-->INDEPENDENT
DRESSING/BATHING_DIFFICULTY: NO
WEAR_GLASSES_OR_BLIND: YES
ADLS_ACUITY_SCORE: 47
ADLS_ACUITY_SCORE: 47
EATING/SWALLOWING: EATING;SWALLOWING SOLID FOOD
ADLS_ACUITY_SCORE: 47
ADLS_ACUITY_SCORE: 39
VISION_MANAGEMENT: GLASSES
ADLS_ACUITY_SCORE: 35
EQUIPMENT_CURRENTLY_USED_AT_HOME: SHOWER CHAIR
WALKING_OR_CLIMBING_STAIRS_DIFFICULTY: NO
DOING_ERRANDS_INDEPENDENTLY_DIFFICULTY: NO
SWALLOWING: 2-->DIFFICULTY SWALLOWING FOODS
CONCENTRATING,_REMEMBERING_OR_MAKING_DECISIONS_DIFFICULTY: NO
SWALLOWING: 2-->DIFFICULTY SWALLOWING FOODS
ADLS_ACUITY_SCORE: 39
ADLS_ACUITY_SCORE: 39
DIFFICULTY_EATING/SWALLOWING: YES
EATING: 0-->INDEPENDENT

## 2023-02-01 NOTE — OR NURSING
Pt is stable and VSS except HR is elevated. This has come down as her pain has decreased. Pt has c/o abdominal pain midline and bilateral. Pt states pain is mostly over mid and right abdomen. Pt has had a decrease in pain with narcotics and is now using PCA. Pt had c/o a full bladder. Pt was not able to void in a bedpan. A bladder scan was done with over 1000 ml in bladder. Dr. Hutchinson (Anesthesiologist) called and he wrote orders for a rose to be placed. Rose placed easily and 1225 ml of yellow urine emptied. Pt has met Pacu transfer criteria and was broughjt up to 7B.

## 2023-02-01 NOTE — PROGRESS NOTES
"   02/01/23 1200   Appointment Info   Signing Clinician's Name / Credentials (PT) Lynette Sequeira, PT, DPT   Rehab Comments (PT) abd. precautions, OT holding, monitor HR       Present no   Living Environment   People in Home spouse   Current Living Arrangements house   Home Accessibility stairs to enter home;stairs within home   Number of Stairs, Main Entrance 4   Stair Railings, Main Entrance railings safe and in good condition   Number of Stairs, Within Home, Primary other (see comments)  (14)   Stair Railings, Within Home, Primary railings on both sides of stairs;railings safe and in good condition   Transportation Anticipated family or friend will provide   Living Environment Comments Pt reports she lives in a three-level home with her  who works during the day. Must negotiate 14 step with B handrails to access tub/shower. 4 LEEROY with handrail.  can provide transportation.   Self-Care   Usual Activity Tolerance good   Current Activity Tolerance poor   Regular Exercise Yes   Activity/Exercise Type walking   Exercise Amount/Frequency 3-5 times/wk   Equipment Currently Used at Home shower chair  (adjustable bed)   Fall history within last six months no   Activity/Exercise/Self-Care Comment IND with ADL's and mobility. Enjoys walking 3x/wk for exercise. No DME. Reports able to ambulate unlimited distances. Tub/shower with shower chair. No falls reported.   General Information   Onset of Illness/Injury or Date of Surgery 02/01/23   Referring Physician Martha Rubi MD   Patient/Family Therapy Goals Statement (PT) return home   Pertinent History of Current Problem (include personal factors and/or comorbidities that impact the POC) per EMR: \"56yo F s/p ex lap, BAUDILIO, perforated J-jejunostomy site resection and replacement of J-jejunostomy site.\"   Existing Precautions/Restrictions fall;abdominal   Weight-Bearing Status - LUE partial weight-bearing (% in comments)  (no lifting " >10lbs)   Weight-Bearing Status - RUE partial weight-bearing (% in comments)  (no lifting >10lbs)   Weight-Bearing Status - LLE full weight-bearing   Weight-Bearing Status - RLE full weight-bearing   Cognition   Orientation Status (Cognition) oriented x 4   Pain Assessment   Patient Currently in Pain Yes, see Vital Sign flowsheet  (8/10 abdominal pain)   Integumentary/Edema   Integumentary/Edema no deficits were identifed   Posture    Posture Forward head position;Protracted shoulders   Range of Motion (ROM)   Range of Motion ROM deficits secondary to pain   Strength (Manual Muscle Testing)   Strength (Manual Muscle Testing) strength is WFL   Strength Comments generalized weakness; grossly 3/5 in B LE   Bed Mobility   Comment, (Bed Mobility) supine > sit using swivel technique, HOB elevated, and CGA   Transfers   Comment, (Transfers) sit > stand with CGA and no AD   Gait/Stairs (Locomotion)   Comment, (Gait/Stairs) gait impaired   Balance   Balance other (describe)   Balance Comments fair sitting and standing balance   Sensory Examination   Sensory Perception patient reports no sensory changes   Coordination   Coordination no deficits were identified   Muscle Tone   Muscle Tone no deficits were identified   Clinical Impression   Criteria for Skilled Therapeutic Intervention Yes, treatment indicated   PT Diagnosis (PT) impaired functional mobility   Influenced by the following impairments decreased balance, strength, and endurance; increased pain   Functional limitations due to impairments difficulty with bed mobility, transfers, walking, and stairs   Clinical Presentation (PT Evaluation Complexity) Stable/Uncomplicated   Clinical Presentation Rationale per clinical judgment   Clinical Decision Making (Complexity) moderate complexity   Planned Therapy Interventions (PT) balance training;bed mobility training;gait training;home exercise program;motor coordination training;neuromuscular re-education;patient/family  education;postural re-education;ROM (range of motion);stair training;strengthening;stretching;transfer training;progressive activity/exercise;risk factor education;home program guidelines   Anticipated Equipment Needs at Discharge (PT)   (tbd)   Risk & Benefits of therapy have been explained evaluation/treatment results reviewed;care plan/treatment goals reviewed;risks/benefits reviewed;current/potential barriers reviewed;participants voiced agreement with care plan;participants included;patient   PT Total Evaluation Time   PT Eval, Moderate Complexity Minutes ([id number]) 20   Plan of Care Review   Plan of Care Reviewed With patient   Physical Therapy Goals   PT Frequency 6x/week   PT Predicted Duration/Target Date for Goal Attainment 02/08/23   PT: Bed Mobility Independent;Supine to/from sit;Rolling;Bridging;Within precautions   PT: Transfers Independent;Sit to/from stand;Bed to/from chair;Within precautions  (with AD vs. without)   PT: Gait Independent;Within precautions;150 feet  (with AD vs. without)   PT: Stairs Modified independent;Greater than 10 stairs;Rail on both sides   PT Discharge Planning   PT Plan bed mobility within precautions, progress OOB as tolerated   PT Discharge Recommendation (DC Rec) home with assist;home with home care physical therapy;home with outpatient physical therapy   PT Rationale for DC Rec Pt is demonstrating functional mobility below baseline. Currently Ax1 for bed mobility and transfers. Primarily limited by decreased activity tolerance 2/2 increased pain. Once medically appropriate and with an improvement in pain, anticipated to d/c home with assist from spouse for ADL's and mobility especially lifting >10lbs. Will also benefit from further PT (HH PT vs. OP PT) to improve safety and IND and reduce caregiver burden. Will assess and update as appropriate pending progress with IP PT. Pt in agreement to plan.   PT Brief overview of current status Ax1 with gait belt for sit <> stands;  use pillow to splint; encourage OOB 3-4x/day

## 2023-02-01 NOTE — ANESTHESIA POSTPROCEDURE EVALUATION
Patient: Dinora Mcghee    Procedure: Procedure(s):  Exploratory Laparotomy, lysis of adhesions, Perforated J-Junostomy Resection, Replace J-Junostomy site       Anesthesia Type:  General    Note:  Disposition: Admission   Postop Pain Control: Uneventful            Sign Out: Well controlled pain   PONV: No   Neuro/Psych: Uneventful            Sign Out: Acceptable/Baseline neuro status   Airway/Respiratory: Uneventful            Sign Out: Acceptable/Baseline resp. status   CV/Hemodynamics: Uneventful            Sign Out: Acceptable CV status; No obvious hypovolemia; No obvious fluid overload   Other NRE: NONE   DID A NON-ROUTINE EVENT OCCUR? No    Event details/Postop Comments:  Patient with urinary retention of 1L.  Order for rose.             Last vitals:  Vitals Value Taken Time   /78 02/01/23 0020   Temp 37  C (98.6  F) 01/31/23 2135   Pulse 115 02/01/23 0026   Resp 16 01/31/23 2300   SpO2 98 % 02/01/23 0026   Vitals shown include unvalidated device data.    Electronically Signed By: Watson Hutchinson MD  February 1, 2023  12:27 AM

## 2023-02-01 NOTE — BRIEF OP NOTE
[facility]    Brief Operative Note    Pre-operative diagnosis: Abdominal pain [R10.9]  Post-operative diagnosis Perforated jejunum at jejunostomy site    Procedure: Procedure(s):  Exploratory Laparotomy, lysis of adhesions, Perforated J-Junostomy Resection, Replace J-Junostomy site  Surgeon: Surgeon(s) and Role:     * Elver Bauer MD - Primary     * Sebastian Walters DO - Assisting     * Martha Rubi MD - Resident - Assisting  Anesthesia: General   Estimated Blood Loss: 50ml    Drains: Replaced jejunostomy  Specimens:   ID Type Source Tests Collected by Time Destination   1 : jejunostomy Tube site With perforation into Mesentary Tissue Other SURGICAL PATHOLOGY EXAM Elver Bauer MD 1/31/2023  8:32 PM      Findings:   Perforated jejunum at the jejunostomy site into the mesentery. Small amount of succus..  Complications: None.  Implants: * No implants in log *    - admit to floor  - PCA  - NPO, IVF  - G tube (RUQ) and J tube (LUQ) to gravity  - essential meds through j tube only  - repeat ertapenem x2 doses

## 2023-02-01 NOTE — ANESTHESIA POSTPROCEDURE EVALUATION
Patient: Dinora Mcghee    Procedure: Procedure(s):  ESOPHAGOGASTRODUODENOSCOPY (EGD) with peg replacement   Dilatation of jejunostomy feeding tube, track with placement of jejunostomy tube with fluoroscopy       Anesthesia Type:  General    Note:  Disposition: Outpatient   Postop Pain Control: Uneventful            Sign Out: Well controlled pain   PONV: No   Neuro/Psych: Uneventful            Sign Out: Acceptable/Baseline neuro status   Airway/Respiratory: Uneventful            Sign Out: Acceptable/Baseline resp. status   CV/Hemodynamics: Uneventful            Sign Out: Acceptable CV status; No obvious hypovolemia; No obvious fluid overload   Other NRE: NONE   DID A NON-ROUTINE EVENT OCCUR? No           Last vitals:  Vitals Value Taken Time   /72 01/31/23 1800   Temp 36  C (96.8  F) 01/31/23 1650   Pulse 90 01/31/23 1810   Resp 21 01/31/23 1810   SpO2 99 % 01/31/23 1810   Vitals shown include unvalidated device data.    Electronically Signed By: Watson Hutchinson MD  January 31, 2023  6:11 PM

## 2023-02-01 NOTE — PROGRESS NOTES
Admitted/transferred from: PACU  2 RN full   skin assessment completed by Ale Calvert, RN and Yoselyn Herrmann, RN.  Skin assessment finding: issues found Blanchable redness behind ears, gastrostomy tube, Jejunostomy tube, and surgical incision.   Interventions/actions: skin interventions Patient educated on ambulating as soon as possible, repositioning in bed, and hydration. Preventative mepilex on coccyx.      Bedside Emergency Equipment Present:  Suction Regulator: Yes  Suction Canister: Yes  Tubing between Regulator and Canister: Yes  O2 Regulator with Tree: Yes  Ambu Bag: Yes

## 2023-02-01 NOTE — H&P
Admitted: 2023    INTERVAL NOTE    TIME OF NOTE:  7:00     I was called by Dr. Park about Ms. Loo who was having significant postoperative pain and discomfort.  I saw Ms. Loo in the recovery room and reviewed x-rays with Dr. Park.  Ms. Loo is in significant abdominal pain (reportedly 10/10) and is quite uncomfortable and her abdomen is tender.  I looked at x-rays including fluoroscopy films with contrast infused through the J-tube that this shows contrast leaking out of the jejunum and into the peritoneum.  I discussed the findings with the patient and her  and we plan on returning her to the operating room for exploratory laparotomy and repair of what appears to be a perforation of her small intestine.    Elver Bauer MD        D: 2023   T: 2023   MT: CHARLES    Name:     MALINI DAVIS  MRN:      [id number]        Account:     [id number]   :      1965           Admitted:    2023       Document: [id number]

## 2023-02-01 NOTE — OP NOTE
[facility]    Operative Note    Pre-operative diagnosis: Abdominal pain [R10.9]   Post-operative diagnosis  Perforated jejunum at jejunostomy site   Procedure: Procedure(s):  Exploratory Laparotomy, lysis of adhesions, Perforated J-Junostomy Resection, Replace J-Junostomy site   Surgeon: Surgeon(s) and Role:     * Elver Bauer MD - Primary     * Sebastian Walters DO - Assisting     * Martha Rubi MD - Resident - Assisting   Anesthesia: General    Estimated blood loss: 50ml   Drains: Replaced Jejunostomy tube   Specimens: ID Type Source Tests Collected by Time Destination   1 : jejunostomy Tube site With perforation into Mesentary Tissue Other SURGICAL PATHOLOGY EXAM Elver Bauer MD 1/31/2023  8:32 PM       Findings: Perforated jejunum at the jejunostomy site into the mesentery. Small amount of succus...   Complications: None.   Implants: None.       INDICATIONS FOR PROCEDURE  Dinora Mcghee is a 57 year old female who has undergone dilation of J-tube tract and replacement of J-tube earlier today. Post op, she developed sever abdominal pain and KUB demonstrated extravasation of contrast concerning for perforation.      OPERATIVE PROCEDURE:    The patient was brought to the operating room and placed in the supine position with appropriate padding for all of the pressure points. A surgical safety checklist was performed to confirm the patient's identity and the site of the procedure. Perioperative antibiotics were provided.  VTE prophylaxis was provided with serial compression devices. The abdomen was then prepped and draped in the usual sterile fashion.    A midline laparotomy incision was made along her previous incision. The peritoneal cavity was entered cautiously. There was the expected adhesions and edema consistent with recent surgery. Lysis of adhesion was carried out with a combination of sharp dissection with  Metzenbaum scissors and electrocautery. Total time of lysis of adhesions was about 60 minutes. There was edema and ecchymosis of the mesentery adjacent to the jejunostomy site and site along the mesenteric border was identified as the site of perforation with a small amount of succus spillage.    The jejunostomy site was then taken down and the tube removed. On further examination of the lumen of the jejunum, there was a perforation into the adjacent mesentery. This segment of small bowel was resected using a 55mm blue load SIRI stapler to divide proximally and distally. The mesentery was divided with the ligasure. The two ends were arranged in an isoperistaltic orientation. A single layer hand sewn anastomosis was performed using 3-0 silk.     The site for the new jejunostomy was chosen about 20 cm distal to the site of resection. A 14-L[place] KISHORE gastrostomy tube was used for the jejunostomy. A pursestring 3-0 silk suture was placed at the site of the enterotomy to secure the tube in place. Additionally, 3-0 silk was placed through the tube and secured to the jejunum. The small bowel proximal and distal to the jejunostomy was secured to the abdominal wall to prevent twisting.     Peritoneal lavage was performed using warm saline. The abdomen was inspected for hemostasis. The fascia was closed using 0-looped PDS and the subcutaneous tissue was irrigated with saline.  The skin was closed with skin staples.    The patient tolerated the procedure well. The sponge, needle, instrument counts were correct.  The patient was then awakened and taken to recovery in stable fashion.    Dr. Bauer was present and scrubbed for the above procedure.    Martha Rubi MD, MS  General Surgery, PGY-7  Pg [phone]

## 2023-02-01 NOTE — PLAN OF CARE
Time: 1723-2231  Temp: (!) 96.7  F (35.9  C) Temp src: Oral BP: 115/64 Pulse: 105   Resp: (!) 8 SpO2: 99 % O2 Device: Nasal cannula Oxygen Delivery: 2.5 LPM     Activity: Not OOB.  Diet: NPO, except ice.   Pain: Rates pain at a moderate level, PCA dilaudid 0.2 mg / 10 minutes.  Neuro: Alert, oriented x4. Drowsy and groggy.   Cardiac: Tachycardic.  Respiratory: Lung sounds clear and diminished. IS education provided. 2.5 LPM nasal cannula.  Skin: See skin note.   GI/: [place], good output. No BM, BS hypoactive.  Lines/inf: Left PIV infusing. Right chest port.   New Labs: BG stable.

## 2023-02-01 NOTE — ANESTHESIA CARE TRANSFER NOTE
Patient: Dinora Mcghee    Procedure: Procedure(s):  Exploratory Laparotomy, lysis of adhesions, Perforated J-Junostomy Resection, Replace J-Junostomy site       Diagnosis: Abdominal pain [R10.9]  Diagnosis Additional Information: No value filed.    Anesthesia Type:   General     Note:    Oropharynx: oropharynx clear of all foreign objects and spontaneously breathing  Level of Consciousness: awake and drowsy  Oxygen Supplementation: nasal cannula    Independent Airway: airway patency satisfactory and stable  Dentition: dentition unchanged  Vital Signs Stable: post-procedure vital signs reviewed and stable    Patient transferred to: PACU    Handoff Report: Identifed the Patient, Identified the Reponsible Provider, Reviewed the pertinent medical history, Discussed the surgical course, Reviewed Intra-OP anesthesia mangement and issues during anesthesia, Set expectations for post-procedure period and Allowed opportunity for questions and acknowledgement of understanding      Vitals:  Vitals Value Taken Time   /60    Temp 36.4    Pulse 93    Resp 12    SpO2 100 01/31/23 2136   Vitals shown include unvalidated device data.    Electronically Signed By: Charles Patrick Schlatter, APRN CRNA  January 31, 2023  9:38 PM

## 2023-02-01 NOTE — PLAN OF CARE
Goal Outcome Evaluation:Patient sleepy upon encounters, easily arouses.Reports intermittent pain to light Abdominal quad.Relief with PCA Dilaudid.Robaxin given.C/O headache,relief with Imitrex.GT-JT to gravity,Urine yellow.Stood, decline to increase activity with increase C/O pain. Noted HR increase with activity.Uses IS with adequate technique.Continue to monitor

## 2023-02-01 NOTE — ANESTHESIA PREPROCEDURE EVALUATION
Anesthesia Pre-Procedure Evaluation    Patient: Dinora Mcghee   MRN: [id number] : 1965        Procedure : Procedure(s):  Ex-lap          Past Medical History:   Diagnosis Date     Allergic rhinitis, cause unspecified      Allergy to other foods     strawberries, apples, celeries, alice, watermelon     Arthritis     left knee     Choledocholithiasis     long after cholecystectomy     Chronic abdominal pain      Chronic constipation      Chronic nausea      Chronic pancreatitis (H)      Degeneration of lumbar or lumbosacral intervertebral disc      Esophageal reflux     w/ hiatal hernia     Gastroparesis      Hiatal hernia      History of pituitary adenoma     s/p resection     Hypothyroidism      Migraines      Mild hyperlipidemia      On tube feeding diet     presence of GJ tube     Pancreatic disease     PD stricture, 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 endometriosis growth     APPENDECTOMY        SECTION       COLONOSCOPY       ENDOSCOPIC INSERTION TUBE GASTROSTOMY N/A 2021    Procedure: Gastrojejonostomy placement with jejunopexy, PEG tube exchange;  Surgeon: Zackery Montoya MD;  [facility]     ENDOSCOPIC RETROGRADE CHOLANGIOPANCREATOGRAM N/A 2015    Procedure: ENDOSCOPIC RETROGRADE CHOLANGIOPANCREATOGRAM;  Surgeon: Mandeep Park MD;  [facility]     ENDOSCOPIC RETROGRADE CHOLANGIOPANCREATOGRAM N/A 2016    Procedure: COMBINED ENDOSCOPIC RETROGRADE CHOLANGIOPANCREATOGRAPHY, PLACE TUBE/STENT;  Surgeon: Mandeep Park MD;  [facility]     ENDOSCOPIC RETROGRADE CHOLANGIOPANCREATOGRAM N/A 3/17/2016    Procedure: COMBINED ENDOSCOPIC  RETROGRADE CHOLANGIOPANCREATOGRAPHY, REMOVE FOREIGN BODY OR STENT/TUBE;  Surgeon: Mandeep Park MD;  [facility]     ENDOSCOPIC RETROGRADE CHOLANGIOPANCREATOGRAM N/A 8/2/2016    Procedure: COMBINED ENDOSCOPIC RETROGRADE CHOLANGIOPANCREATOGRAPHY, PLACE TUBE/STENT;  Surgeon: Mandeep Park MD;  [facility]     ENDOSCOPIC RETROGRADE CHOLANGIOPANCREATOGRAM N/A 8/26/2016    Procedure: COMBINED ENDOSCOPIC RETROGRADE CHOLANGIOPANCREATOGRAPHY, REMOVE FOREIGN BODY OR STENT/TUBE;  Surgeon: Mandeep Park MD;  [facility]     ENDOSCOPIC ULTRASOUND UPPER GASTROINTESTINAL TRACT (GI) N/A 10/3/2016    Procedure: ENDOSCOPIC ULTRASOUND, ESOPHAGOSCOPY / UPPER GASTROINTESTINAL TRACT (GI);  Surgeon: Guru Jose Rodas MD;  [facility]     ENTEROSCOPY SMALL BOWEL N/A 2/3/2022    Procedure: ENTEROSCOPY with possible fistula closure;  Surgeon: Francisco Dodson MD;  Location:  GI     ESOPHAGOSCOPY, GASTROSCOPY, DUODENOSCOPY (EGD), COMBINED N/A 6/24/2015    Procedure: COMBINED ESOPHAGOSCOPY, GASTROSCOPY, DUODENOSCOPY (EGD), REMOVE FOREIGN BODY;  Surgeon: Mandeep Park MD;  Location:  GI     ESOPHAGOSCOPY, GASTROSCOPY, DUODENOSCOPY (EGD), COMBINED N/A 10/25/2015    Procedure: COMBINED ESOPHAGOSCOPY, GASTROSCOPY, DUODENOSCOPY (EGD);  Surgeon: Sammy Amaro MD;  Location:  GI     ESOPHAGOSCOPY, GASTROSCOPY, DUODENOSCOPY (EGD), COMBINED N/A 10/25/2015    Procedure: COMBINED ESOPHAGOSCOPY, GASTROSCOPY, DUODENOSCOPY (EGD), BIOPSY SINGLE OR MULTIPLE;  Surgeon: Sammy Amaro MD;  Location:  GI     ESOPHAGOSCOPY, GASTROSCOPY, DUODENOSCOPY (EGD), DILATATION, COMBINED       EXCISE LESION TRUNK N/A 4/17/2017    Procedure: EXCISE LESION TRUNK;  Removal of Abdominal Foreign Body;  Surgeon: Nestor Phoenix MD;  [facility]     HC ESOPH/GAS REFLUX TEST W NASAL IMPED >1 HR N/A 11/19/2015    Procedure: ESOPHAGEAL IMPEDENCE FUNCTION TEST WITH 24 HOUR PH GREATER THAN  1 HOUR;  Surgeon: Thiago Apple MD;  [facility]     HC UGI ENDOSCOPY DIAG W BIOPSY  9/17/08      [facility] ENDOSCOPY DIAG W BIOPSY  9/27/12     HC UGI ENDOSCOPY W ESOPHAGEAL DILATION BALLOON <30MM  9/17/08     HC UGI ENDOSCOPY W EUS N/A 5/5/2015    Procedure: COMBINED ENDOSCOPIC ULTRASOUND, ESOPHAGOSCOPY, GASTROSCOPY, DUODENOSCOPY (EGD);  Surgeon: Wm Dueñas MD;  [facility]     HC WRIST ARTHROSCOP,RELEASE XVERS LIG Bilateral 12/17/08     INJECT TRANSVERSUS ABDOMINIS PLANE (TAP) BLOCK BILATERAL Left 9/22/2016    Procedure: INJECT TRANSVERSUS ABDOMINIS PLANE (TAP) BLOCK BILATERAL;  Surgeon: Dickson Corrigan MD;  [facility]     INJECT TRIGGER POINT Bilateral 9/8/2022    Procedure: Abdominal trigger point injection with ultrasound;  Surgeon: Monika Mahajan MD;  [facility]     INJECT TRIGGER POINT SINGLE / MULTIPLE 3 OR MORE MUSCLES Right 11/15/2022    Procedure: Trigger point injections right abdomen with ultrasound guidance;  Surgeon: Monika Mahajan MD;  Location: [facility]     IR CHEST PORT PLACEMENT < 5 YRS OF AGE  6/10/2022     laparoscopic pineda  1995     LAPAROSCOPIC HERNIORRHAPHY INCISIONAL N/A 8/23/2018    Procedure: LAPAROSCOPIC HERNIORRHAPHY INCISIONAL;  Laparoscopic Incisional Hernia Repair with Symbotex Mesh Implant;  Surgeon: Nestor Phoenix MD;  [facility]     LAPAROSCOPIC PANCREATECTOMY, TRANSPLANT AUTO ISLET CELL N/A 12/28/2016    Procedure: LAPAROSCOPIC PANCREATECTOMY, TRANSPLANT AUTO ISLET CELL;  Surgeon: Nestor Phoenix MD;  [facility]     REMOVE AND REPLACE BREAST IMPLANT PROSTHESIS N/A 12/30/2022    Procedure: Exploratory Laparotomy, lysis of adhesions, small bowel resection. Placement of gastric jejunostomy for enteral feeding.;  Surgeon: Elver Bauer MD;  [facility]     REMOVE GASTROSTOMY TUBE ADULT N/A 1/28/2022    Procedure: REMOVAL, GASTROSTOMY TUBE, ADULT;  Surgeon: Mandeep Park MD;  [facility]     REPLACE  GASTROJEJUNOSTOMY TUBE, PERCUTANEOUS N/A 2021    Procedure: GASTROJEJUNOSTOMY TUBE PLACEMENT, PERCUTANEOUS, WITH GASTROPEXY;  Surgeon: Mandeep Park MD;  [facility]     REPLACE GASTROJEJUNOSTOMY TUBE, PERCUTANEOUS N/A 2021    Procedure: REPLACEMENT, GASTROJEJUNOSTOMY TUBE, PERCUTANEOUS;  Surgeon: Zackery Montoya MD;  [facility]     REPLACE GASTROJEJUNOSTOMY TUBE, PERCUTANEOUS N/A 2022    Procedure: REPLACEMENT, GASTROJEJUNOSTOMY TUBE, PERCUTANEOUS;  Surgeon: Mandeep Park MD;  [facility]     REPLACE GASTROJEJUNOSTOMY TUBE, PERCUTANEOUS N/A 2022    Procedure: REPLACEMENT, GASTROJEJUNOSTOMY TUBE, PERCUTANEOUS with ENDOSCOPIC SUTURING.;  Surgeon: Mandeep Park MD;  [facility]     REPLACE JEJUNOSTOMY TUBE, PERCUTANEOUS N/A 9/10/2021    Procedure: UPPER ENDOSCOPY, REPLACEMENT OF PERCUTANEOUS GASTROJEJUNOSTOMY TUBE, T-TAG GASTROPEXY;  Surgeon: Zackery Montoya MD;  [facility]     REPLACE JEJUNOSTOMY TUBE, PERCUTANEOUS N/A 2021    Procedure: REPLACEMENT, JEJUNOSTOMY TUBE, PERCUTANEOUS;  Surgeon: Mandeep Park MD;  [facility]     transphenoidal pituitary resection       [facility]  DELIVERY ONLY       [facility]  DELIVERY ONLY      repeat c section with incidental cystotomy with repair     [facility] EXCIS PITUITARY,TRANSNASAL/SEPTAL      pituitary tumor removed for diabetes insipidus     [facility] TOTAL ABDOM HYSTERECTOMY      w/ bilateral salpingoophorectomy       Allergies   Allergen Reactions     Apple Anaphylaxis     Corticosteroids Other (See Comments)     All oral, IV and injectable steroids are contraindicated (unless in life threatening situations) in Islet Auto transplant recipients. They can cause irreversible loss of islet cell function. Please contact patient's transplant care coordinator ADI Gaffney RN at [phone]/pager [phone] and/or endocrinologist prior to administration.        Depakote [Divalproex Sodium] Other (See Comments)     Chest pain     Zithromax [Azithromycin Dihydrate] Anaphylaxis     Noted in 08 ER     Bromfenac      Other reaction(s): Headache     Codeine      Other reaction(s): Hallucinations     Darvocet [Propoxyphene N-Apap] Itching     Morphine Nausea and Vomiting and Rash     Nalbuphine Hcl Rash     RASH :nubaine     Zosyn [Piperacillin-Tazobactam In D5w] Rash     Possible allergy, did have a diffuse rash that seemed drug related but could have also been related to soap in the hospital.      Bactrim [Sulfamethoxazole W-Trimethoprim] Other (See Comments) and Nausea and Vomiting     Severely low liver function.     Hmg-Coa-R Inhibitors Other (See Comments)     Previous liver issues, risks vs benefits felt to not warrant statin.  Discussed Oct 2022 visit     Tape [Adhesive Tape] Blisters     Tramadol      Zofran [Ondansetron] Other (See Comments)     migraine     Flagyl [Metronidazole] Hives and Rash      Social History     Tobacco Use     Smoking status: Former     Packs/day: 0.50     Years: 6.00     Pack years: 3.00     Types: Cigarettes     Start date: 1985     Quit date: 1992     Years since quittin.1     Smokeless tobacco: Never     Tobacco comments:     no 2nd hand   Substance Use Topics     Alcohol use: Not Currently     Alcohol/week: 3.0 - 6.0 standard drinks     Types: 1 - 2 Glasses of wine, 1 - 2 Cans of beer, 1 - 2 Shots of liquor per week     Comment: none since IGG      Wt Readings from Last 1 Encounters:   23 60.6 kg (133 lb 9.6 oz)        Anesthesia Evaluation   Pt has had prior anesthetic. Type: General.    History of anesthetic complications  - PONV.  (responds well to scopolamine patch).    ROS/MED HX  ENT/Pulmonary: Comment: 3 pk yr hx, quit     (+) allergic rhinitis, tobacco use, Past use,  (-) asthma and sleep apnea   Neurologic:     (+) migraines,  (-) no seizures and no CVA   Cardiovascular:     (+) -----Previous cardiac  testing   Echo: Date: Results:    Stress Test: Date: Results:    ECG Reviewed: Date: 8/7/21 Results:  Sinus rhythm  Cannot rule out Anterior infarct , age undetermined  Abnormal ECG   Ventricular rate 66 bpm     Cath: Date: Results:      METS/Exercise Tolerance: 4 - Raking leaves, gardening Comment: Endorses SOB when carrying laundry upstairs. Denies CP. +Fatigue, less stamina 2/2 malnutrition     Hematologic:  - neg hematologic  ROS  (-) history of blood clots and history of blood transfusion   Musculoskeletal: Comment: lumbago, chrondromalacia, stiff shoulder  - neg musculoskeletal ROS     GI/Hepatic: Comment: Hx hepatic dome lesion/IgG4 pseudotumor and elevated LFT's of unclear etiology    Chronic pancreatitis    Severe gastroparesis    Malnutrition, wt loss  Hiatal hernia      (+) GERD, Asymptomatic on medication, hiatal hernia, cholecystitis/cholelithiasis,     Renal/Genitourinary:  - neg Renal ROS     Endo: Comment: post-pancreatectomy diabetes; pancreatic insufficiency.     (+) type I DM, thyroid problem, hypothyroidism,     Psychiatric/Substance Use:  - neg psychiatric ROS     Infectious Disease:  - neg infectious disease ROS     Malignancy:  - neg malignancy ROS (+) Malignancy (pituitary adenoma), History of Neuro.    Other:  - neg other ROS          Physical Exam    Airway        Mallampati: I   TM distance: > 3 FB   Neck ROM: full   Mouth opening: > 3 cm    Respiratory Devices and Support         Dental       (+) Minor Abnormalities - some fillings, tiny chips      Cardiovascular   cardiovascular exam normal          Pulmonary   pulmonary exam normal              not currently breastfeeding.    (Not in a hospital admission)      .lab  OUTSIDE LABS:  CBC:   Lab Results   Component Value Date    WBC 6.8 01/04/2023    WBC 6.7 01/03/2023    HGB 12.5 01/04/2023    HGB 12.7 01/03/2023    HCT 38.5 01/04/2023    HCT 40.3 01/03/2023     01/04/2023     01/03/2023     BMP:   Lab Results   Component  Value Date     01/05/2023     01/04/2023    POTASSIUM 4.2 01/05/2023    POTASSIUM 4.1 01/04/2023    CHLORIDE 100 01/05/2023    CHLORIDE 100 01/04/2023    CO2 26 01/05/2023    CO2 25 01/04/2023    BUN 8.2 01/05/2023    BUN 7.9 01/04/2023    CR 0.57 01/05/2023    CR 0.53 01/04/2023     (H) 01/31/2023    GLC 90 01/31/2023     COAGS:   Lab Results   Component Value Date    PTT 29 01/07/2017    INR 1.03 06/10/2022    FIBR 225 12/28/2016     POC:   Lab Results   Component Value Date    BGM 85 08/24/2018    HCG Negative 12/19/2016     HEPATIC:   Lab Results   Component Value Date    ALBUMIN 4.2 02/17/2022    PROTTOTAL 7.8 02/17/2022    ALT 31 02/17/2022    AST 23 02/17/2022    ALKPHOS 98 02/17/2022    BILITOTAL 0.9 02/17/2022     OTHER:   Lab Results   Component Value Date    PH 7.49 (H) 12/28/2016    LACT 0.9 12/03/2021    A1C 5.4 02/17/2022    KASSI 9.4 01/05/2023    PHOS 4.3 01/05/2023    MAG 1.7 01/09/2023    LIPASE <10 (L) 12/03/2021    AMYLASE 45 01/23/2017    TSH 0.67 09/16/2021    T4 1.13 03/23/2018    CRP 90.0 (H) 12/29/2016    SED 10 09/16/2021   not currently breastfeeding.    (Not in a hospital admission)        Anesthesia Plan    ASA Status:  3, emergent    NPO Status:  NPO Appropriate    Anesthesia Type: General.     - Airway: ETT   Induction: RSI.   Maintenance: TIVA.   Techniques and Equipment:     - Lines/Monitors: Port in situ, BIS     - Blood: T&S     Consents    Anesthesia Plan(s) and associated risks, benefits, and realistic alternatives discussed. Questions answered and patient/representative(s) expressed understanding.    - Discussed:     - Discussed with:  Patient      - Extended Intubation/Ventilatory Support Discussed: No.      - Patient is DNR/DNI Status: No    Use of blood products discussed: No .     Postoperative Care    Pain management: IV analgesics.        Comments:    Other Comments: No steroids due to islet cell transplant.  Pt allergic to zofran.  Requests phenergan and  benadryl for PONV prevention.  Plan TIVA.                Watson Hutchinson MD

## 2023-02-01 NOTE — PROGRESS NOTES
"Surgery [facility] Post-op Check    S: Patient seen at bedside. Pain is significant but relatively well controlled with dPCA. Has had some ice chips. Experienced some nausea post-op, but improved with compazine and benadryl. Patient currently with scopalamine patch. Denies cp, sob. Acute urinary retention in PACU, bladder scanned for >1000, rose placed in PACU with 1,250cc output.       O: /67   Pulse 105   Temp (!) 96.7  F (35.9  C) (Oral)   Resp (!) 8   Ht 1.702 m (5' 7\")   Wt 60.6 kg (133 lb 9.6 oz)   SpO2 97%   BMI 20.92 kg/m      Gen: A&O x3, NAD   Resp: NLB on 2.5 LNC  CV: rrr, wwp  Abdomen: soft, appropriately tender non-focally, non-distended, G-tube in RUQ to gravity, no output in bag, J-tube in LUQ/flank to gravity, some serosang output in tubing, no output in bag, midline incision with dressing in place, no shadowing on dressing  Extremities: no oedema noted, wwp, SCDs in place    A/P: 56yo F s/p ex lap, BAUDILIO, perforated J-jejunostomy site resection and replacement of J-jejunostomy site. No acute post-op issues.     - pain control: dPCA  - NPO except for ice chips  - mIVF 100ml/hr  - pulmonary hygiene, IS  - OOB  - Rest of care per primary team    Janene Gomez, PGY1  General Surgery Resident  [phone]   "

## 2023-02-01 NOTE — PROGRESS NOTES
[facility]    Progress Note - EGS Service       Date of Admission:  1/31/2023    Assessment & Plan: Surgery   Dinora Mcghee is a 56yo F who came in for J-tube and G-tube exchange on 1/31. Later that evening, Ms. Loo who was having significant postoperative pain and discomfort.  X-rays including fluoroscopy films with contrast infused through the J-tube showed that there was contrast leaking out of the jejunum and into the peritoneum. RTOR for exploratory laparotomy and repair of small intestine perforation. Now recovering on the floor.     -NPO, mIVF, multimodal pain control  -Only liquid medications through NJ, otherwise NJ and G-tube to gravity.   -pulmonary hygiene, IS  -Baseline nausea control  -Migraine medication switched to IV for migraine this AM.        Diet: NPO for Medical/Clinical Reasons Except for: Ice Chips    DVT Prophylaxis: Enoxaparin (Lovenox) SQ  Piedra Catheter: PRESENT, indication: /GI/GYN Pelvic Procedure  Lines: PRESENT      Port A Cath Single 12/09/22 Right Chest wall-Site Assessment: WDL      Drains: PRESENT         - 2 Biliary Drain(s)   Cardiac Monitoring: None  Code Status: Full Code      Clinically Significant Risk Factors Present on Admission            # Hypomagnesemia: Lowest Mg = 1.3 mg/dL in last 2 days, will replace as needed                    Disposition Plan      Expected Discharge Date: 02/04/2023                 The patient's care was discussed with Dr. Bauer.    Josse Dao MD  [facility]  Non-urgent messages: Securely message with [employer] (more info)  Text page via [employer] Paging/Directory     ______________________________________________________________________    Interval History   Patient in appropriate amount of pain. Baseline nausea. Migraine this AM.     Physical Exam   Vital Signs: Temp: 98.3  F (36.8  C) Temp src: Oral BP: 115/57 Pulse: 107   Resp: (!) 6 SpO2: 97 %  O2 Device: Nasal cannula Oxygen Delivery: 2.5 LPM  Weight: 133 lbs 9.58 oz    Intake/Output Summary (Last 24 hours) at 2/1/2023 1059  Last data filed at 2/1/2023 0946  Gross per 24 hour   Intake 1700 ml   Output 1225 ml   Net 475 ml     Gen: A&O x3, NAD   Resp: NLB on 2.5 LNC  CV: rrr, wwp  Abdomen: soft, appropriately tender non-focally, non-distended, G-tube in RUQ to gravity, J-tube in LUQ/flank to gravity, midline incision with dressing in place, no shadowing on dressing  Extremities: no edema noted, wwp, SCDs in place      Data     I have personally reviewed the following data over the past 24 hrs:    15.3 (H)  \   12.2   / 337     138 100 7.2 /  122 (H)   4.1 29 0.53 \       Imaging results reviewed over the past 24 hrs:   Recent Results (from the past 24 hour(s))   XR Surgery GAMA L/T 5 Min Fluoro w Stills    Narrative    This exam was marked as non-reportable because it will not be read by a   radiologist or a [facility] non-radiologist provider.         XR Abdomen Flat and Decub    Narrative    EXAM: XR ABDOMEN FLAT AND DECUB  1/31/2023 6:49 PM     HISTORY:  Ro small bowel perforation after dilation and replacement of  J tube, post contrast       TECHNIQUE: Single frontal radiograph of the abdomen    COMPARISON:  CT abdomen 1/12/2023    FINDINGS:   AP and decubitus views of the abdomen. There is a gastric and  jejunostomy tube projecting over the right and left naomi-abdomen.    Surgical sutures and surgical staples are seen overlying the right and  left abdomen. Contrast within the injected J-tubing appears to outline  bowel and is extraluminal. No evidence of free air.    Nonobstructive bowel gas pattern. No pneumatosis, portal venous gas.  No acute osseous anomaly.      Impression    IMPRESSION:  1. Contrast in the J-tube and left upper quadrant appears to persist  and is likely extraluminal. No free air.  2. Nonobstructive bowel gas pattern.    I have personally reviewed the examination and initial  interpretation  and I agree with the findings.    TIN AMAYA MD         SYSTEM ID:  [id number]

## 2023-02-01 NOTE — ANESTHESIA PROCEDURE NOTES
Airway       Patient location during procedure: OR       Procedure Start/Stop Times: 1/31/2023 7:31 PM  Staff -        CRNA: Rossi Dean APRN CRNA       Performed By: CRNA  Consent for Airway        Urgency: elective  Indications and Patient Condition       Indications for airway management: andrez-procedural       Induction type:intravenous       Mask difficulty assessment: 0 - not attempted    Final Airway Details       Final airway type: endotracheal airway       Successful airway: ETT - single  Endotracheal Airway Details        ETT size (mm): 7.0       Cuffed: yes       Successful intubation technique: direct laryngoscopy       DL Blade Type: MAC 3       Grade View of Cords: 1       Adjucts: stylet       Position: Right       Measured from: lips       Bite block used: None    Post intubation assessment        Placement verified by: capnometry, equal breath sounds and chest rise        Number of attempts at approach: 1       Secured with: cloth tape       Ease of procedure: easy       Dentition: Unchanged and Intact    Medication(s) Administered   Medication Administration Time: 1/31/2023 7:31 PM

## 2023-02-01 NOTE — PROGRESS NOTES
See dictated note  Pt with significant postop abdominal pain.  Flouro and KUB with contrast show contrast outside of GI tract.  Discussed findings with patient and .  Plan return to OR for ex lap.    GB  [phone]

## 2023-02-01 NOTE — OP NOTE
Procedure Date: 2023    PREOPERATIVE DIAGNOSIS:  Poorly functioning jejunostomy tube.    POSTOPERATIVE DIAGNOSIS:  Poorly functioning jejunostomy tube.    PROCEDURE PERFORMED:  Dilation of J-tube tract and replacement of J-tube with a 14-South African J-tube.      Risks and benefits of the surgery were explained at length to the patient and  prior to surgery.    OPERATIVE FINDINGS:  A 14-South African KISHORE gastrostomy tube without the balloon inflated was placed without difficulty into the J-tube tract.    OPERATIVE TECHNIQUE:  The patient was brought to the operating room where general endotracheal anesthesia was induced.  The patient's previously placed 10-South African red rubber catheter was removed and we placed a Glidewire into the J-tube tract confirming the placement of the guidewire in what appeared to be the small intestine.  We then used progressive dilators starting at a 10-South African and ending at a 16-South African dilator, inserted under fluoroscopy up to a depth of about 5-6 cm without difficulty.  Finally, a 14-South African KISHORE gastrostomy tube was passed over the guidewire and into the small bowel.  We used fluoroscopy to make sure that the tube was passed completely within the small intestine.  The tube was secured in place.  The patient tolerated the procedure well without complications.  Dr. Park will dictate the portion of his procedure.    Elver Bauer MD        D: 2023   T: 2023   MT: CHARLES    Name:     MALINI DAVIS  MRN:      [id number]        Account:        [id number]   :      1965           Procedure Date: 2023     Document: [id number]

## 2023-02-02 ENCOUNTER — APPOINTMENT (OUTPATIENT)
Dept: PHYSICAL THERAPY | Facility: CLINIC | Age: 58
End: 2023-02-02
Attending: INTERNAL MEDICINE
Payer: COMMERCIAL

## 2023-02-02 LAB
ERYTHROCYTE [DISTWIDTH] IN BLOOD BY AUTOMATED COUNT: 13.1 % (ref 10–15)
GLUCOSE BLDC GLUCOMTR-MCNC: 117 MG/DL (ref 70–99)
HCT VFR BLD AUTO: 38.2 % (ref 35–47)
HGB BLD-MCNC: 12.5 G/DL (ref 11.7–15.7)
MCH RBC QN AUTO: 31.2 PG (ref 26.5–33)
MCHC RBC AUTO-ENTMCNC: 32.7 G/DL (ref 31.5–36.5)
MCV RBC AUTO: 95 FL (ref 78–100)
PHOSPHATE SERPL-MCNC: 2.1 MG/DL (ref 2.5–4.5)
PLATELET # BLD AUTO: 324 10E3/UL (ref 150–450)
POTASSIUM SERPL-SCNC: 4.2 MMOL/L (ref 3.4–5.3)
RBC # BLD AUTO: 4.01 10E6/UL (ref 3.8–5.2)
WBC # BLD AUTO: 14.2 10E3/UL (ref 4–11)

## 2023-02-02 PROCEDURE — 250N000011 HC RX IP 250 OP 636

## 2023-02-02 PROCEDURE — 250N000009 HC RX 250

## 2023-02-02 PROCEDURE — 120N000002 HC R&B MED SURG/OB UMMC

## 2023-02-02 PROCEDURE — 250N000011 HC RX IP 250 OP 636: Performed by: SURGERY

## 2023-02-02 PROCEDURE — 250N000012 HC RX MED GY IP 250 OP 636 PS 637: Performed by: STUDENT IN AN ORGANIZED HEALTH CARE EDUCATION/TRAINING PROGRAM

## 2023-02-02 PROCEDURE — 84132 ASSAY OF SERUM POTASSIUM: CPT | Performed by: SURGERY

## 2023-02-02 PROCEDURE — 84100 ASSAY OF PHOSPHORUS: CPT | Performed by: SURGERY

## 2023-02-02 PROCEDURE — 97530 THERAPEUTIC ACTIVITIES: CPT | Mod: GP

## 2023-02-02 PROCEDURE — 250N000011 HC RX IP 250 OP 636: Performed by: STUDENT IN AN ORGANIZED HEALTH CARE EDUCATION/TRAINING PROGRAM

## 2023-02-02 PROCEDURE — 258N000003 HC RX IP 258 OP 636: Performed by: SURGERY

## 2023-02-02 PROCEDURE — 250N000013 HC RX MED GY IP 250 OP 250 PS 637: Performed by: STUDENT IN AN ORGANIZED HEALTH CARE EDUCATION/TRAINING PROGRAM

## 2023-02-02 PROCEDURE — 36591 DRAW BLOOD OFF VENOUS DEVICE: CPT | Performed by: SURGERY

## 2023-02-02 PROCEDURE — 250N000013 HC RX MED GY IP 250 OP 250 PS 637

## 2023-02-02 PROCEDURE — 250N000009 HC RX 250: Performed by: SURGERY

## 2023-02-02 PROCEDURE — 250N000013 HC RX MED GY IP 250 OP 250 PS 637: Performed by: SURGERY

## 2023-02-02 PROCEDURE — 85014 HEMATOCRIT: CPT

## 2023-02-02 RX ORDER — FLUORIDE TOOTHPASTE
10 TOOTHPASTE DENTAL 4 TIMES DAILY PRN
Status: DISCONTINUED | OUTPATIENT
Start: 2023-02-02 | End: 2023-02-05 | Stop reason: HOSPADM

## 2023-02-02 RX ORDER — ZOLMITRIPTAN 2.5 MG/1
5 TABLET, ORALLY DISINTEGRATING ORAL ONCE
Status: COMPLETED | OUTPATIENT
Start: 2023-02-02 | End: 2023-02-02

## 2023-02-02 RX ORDER — GUAIFENESIN 600 MG/1
15 TABLET, EXTENDED RELEASE ORAL DAILY
Status: DISCONTINUED | OUTPATIENT
Start: 2023-02-02 | End: 2023-02-05 | Stop reason: HOSPADM

## 2023-02-02 RX ORDER — MONTELUKAST SODIUM 10 MG/1
10 TABLET ORAL AT BEDTIME
Status: ON HOLD | COMMUNITY
End: 2023-11-09

## 2023-02-02 RX ORDER — OXYCODONE HCL 5 MG/5 ML
5-10 SOLUTION, ORAL ORAL EVERY 4 HOURS PRN
Status: DISCONTINUED | OUTPATIENT
Start: 2023-02-02 | End: 2023-02-04

## 2023-02-02 RX ORDER — SCOLOPAMINE TRANSDERMAL SYSTEM 1 MG/1
1 PATCH, EXTENDED RELEASE TRANSDERMAL
Status: DISCONTINUED | OUTPATIENT
Start: 2023-02-02 | End: 2023-02-05 | Stop reason: HOSPADM

## 2023-02-02 RX ORDER — ACETAMINOPHEN 325 MG/10.15ML
975 LIQUID ORAL EVERY 8 HOURS PRN
Status: DISCONTINUED | OUTPATIENT
Start: 2023-02-02 | End: 2023-02-05 | Stop reason: HOSPADM

## 2023-02-02 RX ORDER — CYCLOBENZAPRINE HCL 10 MG
10 TABLET ORAL 2 TIMES DAILY PRN
COMMUNITY
End: 2023-02-22

## 2023-02-02 RX ORDER — DEXTROSE MONOHYDRATE 100 MG/ML
INJECTION, SOLUTION INTRAVENOUS CONTINUOUS PRN
Status: DISCONTINUED | OUTPATIENT
Start: 2023-02-02 | End: 2023-02-05 | Stop reason: HOSPADM

## 2023-02-02 RX ORDER — LEVOTHYROXINE SODIUM 137 UG/1
137 TABLET ORAL DAILY
COMMUNITY
End: 2023-11-29

## 2023-02-02 RX ORDER — CAFFEINE CITRATE 20 MG/ML
200 SOLUTION ORAL ONCE
Status: COMPLETED | OUTPATIENT
Start: 2023-02-02 | End: 2023-02-02

## 2023-02-02 RX ADMIN — POTASSIUM PHOSPHATE, MONOBASIC POTASSIUM PHOSPHATE, DIBASIC 9 MMOL: 224; 236 INJECTION, SOLUTION, CONCENTRATE INTRAVENOUS at 14:42

## 2023-02-02 RX ADMIN — Medication 10 ML: at 21:24

## 2023-02-02 RX ADMIN — ACETAMINOPHEN 650 MG: 325 SOLUTION ORAL at 04:42

## 2023-02-02 RX ADMIN — OXYCODONE HYDROCHLORIDE 5 MG: 5 SOLUTION ORAL at 21:23

## 2023-02-02 RX ADMIN — ERTAPENEM SODIUM 1 G: 1 INJECTION, POWDER, LYOPHILIZED, FOR SOLUTION INTRAMUSCULAR; INTRAVENOUS at 19:47

## 2023-02-02 RX ADMIN — ZOLMITRIPTAN 2.5 MG: 2.5 TABLET, ORALLY DISINTEGRATING ORAL at 11:37

## 2023-02-02 RX ADMIN — ENOXAPARIN SODIUM 40 MG: 40 INJECTION SUBCUTANEOUS at 12:41

## 2023-02-02 RX ADMIN — PROMETHAZINE HYDROCHLORIDE 6.25 MG: 25 INJECTION INTRAMUSCULAR; INTRAVENOUS at 12:41

## 2023-02-02 RX ADMIN — Medication 10 ML: at 14:27

## 2023-02-02 RX ADMIN — OXYCODONE HYDROCHLORIDE 5 MG: 5 SOLUTION ORAL at 12:41

## 2023-02-02 RX ADMIN — METHOCARBAMOL 500 MG: 100 INJECTION, SOLUTION INTRAMUSCULAR; INTRAVENOUS at 14:36

## 2023-02-02 RX ADMIN — METHOCARBAMOL 500 MG: 100 INJECTION, SOLUTION INTRAMUSCULAR; INTRAVENOUS at 01:32

## 2023-02-02 RX ADMIN — SCOPALAMINE 1 PATCH: 1 PATCH, EXTENDED RELEASE TRANSDERMAL at 06:13

## 2023-02-02 RX ADMIN — SODIUM CHLORIDE, POTASSIUM CHLORIDE, SODIUM LACTATE AND CALCIUM CHLORIDE: 600; 310; 30; 20 INJECTION, SOLUTION INTRAVENOUS at 14:48

## 2023-02-02 RX ADMIN — OXYCODONE HYDROCHLORIDE 5 MG: 5 SOLUTION ORAL at 16:55

## 2023-02-02 RX ADMIN — FAMOTIDINE 20 MG: 40 POWDER, FOR SUSPENSION ORAL at 07:53

## 2023-02-02 RX ADMIN — CAFFEINE CITRATE 200 MG: 20 INJECTION INTRAVENOUS at 17:42

## 2023-02-02 RX ADMIN — PROCHLORPERAZINE EDISYLATE 10 MG: 5 INJECTION INTRAMUSCULAR; INTRAVENOUS at 00:46

## 2023-02-02 RX ADMIN — PROMETHAZINE HYDROCHLORIDE 6.25 MG: 25 INJECTION INTRAMUSCULAR; INTRAVENOUS at 04:27

## 2023-02-02 RX ADMIN — SUMATRIPTAN 6 MG: 6 INJECTION, SOLUTION SUBCUTANEOUS at 04:38

## 2023-02-02 RX ADMIN — METHOCARBAMOL 500 MG: 100 INJECTION, SOLUTION INTRAMUSCULAR; INTRAVENOUS at 23:04

## 2023-02-02 RX ADMIN — OXYCODONE HYDROCHLORIDE 5 MG: 5 SOLUTION ORAL at 07:52

## 2023-02-02 RX ADMIN — LEVOTHYROXINE SODIUM 137 MCG: 0.14 TABLET ORAL at 07:52

## 2023-02-02 RX ADMIN — MULTIVITAMIN 15 ML: LIQUID ORAL at 16:55

## 2023-02-02 RX ADMIN — Medication 10 ML: at 06:15

## 2023-02-02 RX ADMIN — PROMETHAZINE HYDROCHLORIDE 6.25 MG: 25 INJECTION INTRAMUSCULAR; INTRAVENOUS at 21:23

## 2023-02-02 ASSESSMENT — ACTIVITIES OF DAILY LIVING (ADL)
ADLS_ACUITY_SCORE: 35
ADLS_ACUITY_SCORE: 33
ADLS_ACUITY_SCORE: 35
ADLS_ACUITY_SCORE: 35
ADLS_ACUITY_SCORE: 33
ADLS_ACUITY_SCORE: 33
ADLS_ACUITY_SCORE: 35
ADLS_ACUITY_SCORE: 33

## 2023-02-02 NOTE — PHARMACY-CONSULT NOTE
Pharmacy Tube Feeding Consult    Medication reviewed for administration by feeding tube and for potential food/drug interactions.    Recommendation: Recommend holding tube feedings for 1 hours before and 1 hours after administration of levothyroxine     Pharmacy will continue to follow as new medications are ordered.

## 2023-02-02 NOTE — PLAN OF CARE
"Goal Outcome Evaluation:      Plan of Care Reviewed With: patient    Overall Patient Progress: no changeOverall Patient Progress: no change         /55 (BP Location: Right arm)   Pulse 106   Temp 97.5  F (36.4  C) (Oral)   Resp 16   Ht 1.702 m (5' 7\")   Wt 60.6 kg (133 lb 9.6 oz)   SpO2 96%   BMI 20.92 kg/m        Status: Stable, VS WDL, weaned off of O2.   Pain/Nausea: Utilized regimens as ordered for pain and nausea.   Mobility: Up with assist, declined offered to get OOB on this shift.   Diet: NPO; ice chips only.   Labs: Reviewed. K 4.1, Mag 1.9, Phos 3.9,  LDAs: L chest port, Piedra Cath, G tube, J tube, PCA pump.   Skin/incisions: Abdominal incision, G tub, J tube, Port-A-Cath.   Neuro: Intact, able communicate needs.   Respiratory: WDL; tolerated weaning off of O2 well, and now on RA.   Cardiac: Tachycardia.   GI/: Continent of BM, LBM 1/31/23, has Piedra Cath with AUOP.   New Changes: None.   Plan: Continue to monitor and follow POC.   "

## 2023-02-02 NOTE — PROGRESS NOTES
[facility]    Progress Note - EGS Service       Date of Admission:  1/31/2023    Assessment & Plan: Surgery   Dinora Mcghee is a 58yo F who came in for J-tube and G-tube exchange on 1/31. Later that evening, Ms. Loo who was having significant postoperative pain and discomfort.  X-rays including fluoroscopy films with contrast infused through the J-tube showed that there was contrast leaking out of the jejunum and into the peritoneum. RTOR for exploratory laparotomy and repair of small intestine perforation. Now recovering on the floor.     -NPO, mIVF, multimodal pain control  -Start TF through J-tube at 10ml/hr   -Only liquid medications through NJ, otherwise NJ and G-tube to gravity.   -pulmonary hygiene, ISS  -nausea control        Diet: NPO for Medical/Clinical Reasons Except for: Ice Chips    DVT Prophylaxis: Enoxaparin (Lovenox) SQ  Piedra Catheter: PRESENT, indication: Retention  Lines: PRESENT      Port A Cath Single 12/09/22 Right Chest wall-Site Assessment: WDL      Drains: PRESENT         - 2 Biliary Drain(s)   Cardiac Monitoring: None  Code Status: Full Code      Clinically Significant Risk Factors            # Hypomagnesemia: Lowest Mg = 1.3 mg/dL in last 2 days, will replace as needed                     Disposition Plan     Expected Discharge Date: 02/04/2023                 The patient's care was discussed with Dr. Bauer.    Josse Dao MD  [facility]  Non-urgent messages: Securely message with [employer] (more info)  Text page via [employer] Paging/Directory     ______________________________________________________________________    Interval History   NAOE. Migraine improved as well as general pain. Nauseous overnight but also improved this AM.    Physical Exam   Vital Signs: Temp: 98.2  F (36.8  C) Temp src: Oral BP: 126/60 Pulse: 97   Resp: 16 SpO2: 98 % O2 Device: None (Room air) Oxygen Delivery: 1  LPM  Weight: 133 lbs 9.58 oz    Intake/Output Summary (Last 24 hours) at 2/2/2023 0836  Last data filed at 2/2/2023 0600  Gross per 24 hour   Intake 1540 ml   Output 2625 ml   Net -1085 ml     Gen: A&O x3, NAD   Resp: NLB on RA  CV: rrr, wwp  Abdomen: soft, tender non-focally, non-distended, G-tube in RUQ to gravity, J-tube in LUQ/flank to gravity, midline incision with dressing in place, no shadowing on dressing  Extremities: no edema noted, wwp, SCDs in place          Data     I have personally reviewed the following data over the past 24 hrs:    14.2 (H)  \   12.5   / 324     138 100 7.2 /  117 (H)   4.2 29 0.53 \       Imaging results reviewed over the past 24 hrs:   No results found for this or any previous visit (from the past 24 hour(s)).

## 2023-02-02 NOTE — PROGRESS NOTES
"CLINICAL NUTRITION SERVICES - ASSESSMENT NOTE     Nutrition Prescription    RECOMMENDATIONS FOR MDs/PROVIDERS TO ORDER:  None at present.    Malnutrition Status:    Unable to determine due to limited data.    Recommendations already ordered by Registered Dietitian (RD):  - Start Vital 1.5 @ 10 ml/hr via J tube.  Do not advance unless ordered by Gen Surgery.   - FWF: 30 ml q 4 hr.  - liquid multivit w/ minerals.   - Enter following order for RELIZORB CARTRIDGES  *Change 1 cartridge every 24 hours with TF rate @ 10-20 ml/hr  *Change 1 cartridge every 12 hours with TF rate >20 ml/hr  *Supplies: Obtain cartridges in the  unit med room or call [phone] and provide peoplesoft #[phone]    Future/Additional Recommendations:  - Recommend advancing Vital 1.5 per Gen surgery to goal of 45 ml/hr to provide 1620 kcals, 72 gm PRO, 825 ml free H20, 201 gm CHO, and 6 gm soluble fiber daily.   Follow TF tolerance with advance per Gen Surgery.   - See 2/3 for NFPE to complete malnutrition assessment.      REASON FOR ASSESSMENT  Dinora Mcghee is a/an 57 year old female assessed by the dietitian for Interdisciplinary Rounds, RD consult to order TF \"Start @ 10 ml/hr\"  Clarified with provider that advance would be managed by Dr Bauer.     NUTRITION HISTORY  Pt discharged 1/9 with Vital 1.5 TF rate still @ 30 ml/hr with goal rate of 45 ml/hr. At goal would provide: 1620 kcals, 72 gm PRO, 825 ml free H20, 201 gm CHO, and 6 gm soluble fiber daily. Dinora notes that she advanced up to this goal within a couple days of returning home.  While the rate is set for 45 ml/hr (= 4.5 cans) she states she's only using 4 cans/day--so likely running for ~21 hr/day.  Over the past couple of days she was running the TF @ 50 ml/hr with a few hours off.  She is also eating a little as well.     Pt notes that recently she had several G tube balloons 'pop.' Finally it was discovered that a T fastner or some other material that is supposed to dissolve was " "still intacted and was very close to the balloon.  Surgeon placed a \"mushroom\" over the balloon to shield it and when they see that this sharp material has dissolved they can remove the \"mushroom.\"  Pt feels that since her last surgery she has had very regular stools.  She believes this is done to having two \"obstructions\" and some scar tissue removed.     CURRENT NUTRITION ORDERS  Diet: NPO    LR @ 100 ml/hr.     LABS  Labs reviewed  2/2: K+ 4.2, Phos 2.1 (being replaced).  2/1 Mg 1.9    MEDICATIONS  Medications reviewed  Standard Mg++, Phos and K+ replacement protocols.  Pepcid, levothyroxine, KPhos in D5W IV today per protocol.    ANTHROPOMETRICS  Height: 170.2 cm (5' 7\")  Most Recent Weight: 60.6 kg (133 lb 9.6 oz)    IBW: 61.4 kg (99% IBW)  BMI: Normal BMI  Weight History: Wt down 4.9% in past month. Pt believes she was weighing about 136 lb on her home scale lately. Pt's aide states she did a standing wt with her 2/1 but RN apparently didn't record this.  Wt Readings from Last 20 Encounters:   01/31/23 60.6 kg (133 lb 9.6 oz) [facility], fasting   01/26/23 63.4 kg (139 lb 12.8 oz) clinic    01/19/23 60.8 kg (134 lb 1.6 oz) clinic   01/12/23 61.4 kg (135 lb 6.4 oz) clinic   01/04/23 63.8 kg (140 lb 11.2 oz) last wt of admission   12/30/22 63.7 kg (140 lb 6.9 oz) admission wt   12/22/22 65.2 kg (143 lb 12.8 oz)   12/09/22 65.9 kg (145 lb 4.5 oz)   12/01/22 65.6 kg (144 lb 9.6 oz)   11/22/22 64.5 kg (142 lb 3.2 oz)   11/15/22 64 kg (141 lb)   11/02/22 65.5 kg (144 lb 4.8 oz)   09/08/22 66.2 kg (146 lb)   09/07/22 66.5 kg (146 lb 9.6 oz)   08/01/22 65.1 kg (143 lb 8 oz)   06/10/22 64 kg (141 lb)   06/08/22 64 kg (141 lb)   06/02/22 63.8 kg (140 lb 9.6 oz)   05/03/22 63.7 kg (140 lb 6.4 oz)   02/17/22 61.7 kg (136 lb)   02/17/22 61.9 kg (136 lb 7.4 oz)       Dosing Weight: 61.4 kg based on IBW as 1/31 wt below pt's norm    ASSESSED NUTRITION NEEDS  Estimated Energy Needs: 1535 - 1842 kcals/day (25 - 30 " kcals/kg)  Justification: Maintenance  Estimated Protein Needs: 61- 74+ grams protein/day (1 - 1.2 grams of pro/kg)  Justification: Increased needs (RTOR w/ BAUDILIO, repair of perf jejunum)  Estimated Fluid Needs: 1842 - 2149 mL/day (30 - 35 mL/kg)   Justification: Maintenance (w/ some losses w/ G tube possible) and Per provider pending fluid status    PHYSICAL FINDINGS  See malnutrition section below.    MALNUTRITION  % Intake: Decreased intake does not meet criteria--likely still getting > 75% of TF prescribed.   % Weight Loss: Weight loss does not meet criteria  Subcutaneous Fat Loss: Unable to assess--pt getting cleaned up during visit  Muscle Loss: Unable to assess  Fluid Accumulation/Edema: None noted  Malnutrition Diagnosis: Unable to determine due to limited data.    NUTRITION DIAGNOSIS  Altered GI function related to gastroparessis as evidenced by need for TF via the J tube to meet majority of needs with G tube for decompression as needed.     INTERVENTIONS  Implementation  Nutrition Education: Explained pt to resume her usual TF w/ relizorb @ 10 ml/hr and advance per Dr Bauer.   Enteral Nutrition - Initiate without advance.  Feeding tube flush  Multivitamin/mineral supplement therapy     Goals  Total avg nutritional intake to meet a minimum of 25 kcal/kg and 1.0 g PRO/kg daily (per dosing wt 61.4 kg).     Monitoring/Evaluation  Progress toward goals will be monitored and evaluated per protocol.    Zoë Plascencia RD, LD   7B (M-F) Pager: [phone]  RD Weekend/Holiday Pager: [phone]

## 2023-02-02 NOTE — PROGRESS NOTES
Care Management Initial Consult    General Information  Assessment completed with: Patient,         Primary Care Provider verified and updated as needed: Yes   Readmission within the last 30 days:        Reason for Consult: discharge planning  Advance Care Planning: Advance Care Planning Reviewed: present on chart          Communication Assessment  Patient's communication style: spoken language (English or Bilingual)    Hearing Difficulty or Deaf: no   Wear Glasses or Blind: yes    Cognitive  Cognitive/Neuro/Behavioral: WDL  Level of Consciousness: alert  Arousal Level: opens eyes spontaneously  Orientation: oriented x 4  Mood/Behavior: calm, cooperative  Best Language: 0 - No aphasia  Speech: logical, clear    Living Environment:   People in home: alone, spouse     Current living Arrangements: house      Able to return to prior arrangements: yes       Family/Social Support:  Care provided by: self, spouse/significant other  Provides care for: no one  Marital Status:   , Children, Other (specify) (Friends, [employer] , HC nurse)          Description of Support System: Supportive, Involved    Support Assessment: Adequate family and caregiver support    Current Resources:   Patient receiving home care services: Yes  Skilled Home Care Services: Skilled Nursing  Community Resources: Home Infusion, Home Care  Equipment currently used at home: other (see comments) (IV pole)  Supplies currently used at home:      Employment/Financial:  Employment Status: self-employed        Financial Concerns:         None    Lifestyle & Psychosocial Needs:  Social Determinants of Health     Tobacco Use: Medium Risk     Smoking Tobacco Use: Former     Smokeless Tobacco Use: Never     Passive Exposure: Not on file   Alcohol Use: Not on file   Financial Resource Strain: Not on file   Food Insecurity: Not on file   Transportation Needs: Not on file   Physical Activity: Not on file   Stress: Not on file   Social  Connections: Not on file   Intimate Partner Violence: Not on file   Depression: At risk     PHQ-2 Score: 3   Housing Stability: Not on file       Functional Status:  Prior to admission patient needed assistance:      No, however the pt states that she had a lifting restriction so her  would help her with lifting things like the laundry baskets.        Mental Health Status:          Chemical Dependency Status:                Values/Beliefs:  Spiritual, Cultural Beliefs, Yarsanism Practices, Values that affect care:                 Additional Information:  RNCC met with the pt and described the role. PCP verified and listed on the chart. The pt is on tube feeds and IV fluids, and was prior to admission. The pt uses [facility] for TF and IV supplies. The pt was on service prior to admission with [facility] for nursing to access her port and provide tube cares. [facility] Phone # [phone], Fax # [phone]. The pt is denying home care PT or outpatient PT at this time. The pt is aware that the RNCC will continue to be involved if she decides that she wants PT or if she has other needs. No other needs identified. External hand off sent.Resumption orders placed for HC and [facility].  Renee James, RN, BSN, PHN  Weekend/Holiday RNCC

## 2023-02-02 NOTE — PLAN OF CARE
Vital signs:  Temp: 98.1  F (36.7  C) Temp src: Oral BP: 114/55 Pulse: 105   Resp: 16 SpO2: 98 % O2 Device: None (Room air)     Activity: Up with Ax1.   Neuros: A&O x4.   Cardiac: WDL ex tachy with HR low 100s.   Respiratory: WDL on RA. Denies SOB.  GI/: Piedra intact with AUOP. Hypoactive BS, -flatus.   Diet: NPO ex ice chips. TF started @ 10 mL/hr around 1700.   Lines: Right single chest Port infusing IVMF.   Incisions/Drains: Midline abdominal incision dressing CDI. Jtube with TF. Gtube to gravity.   Labs: Phos infusing, recheck in AM.  Pain/nausea: Pt c/o headache, wanted coffee, however is NPO, Provider notified for caffeine med order, given. Oxy given x1. Intermittent nausea.   Plan: SW consult ordered.

## 2023-02-02 NOTE — PLAN OF CARE
Goal Outcome Evaluation:      Plan of Care Reviewed With: patient    Overall Patient Progress: no changeOverall Patient Progress: no change    Outcome Evaluation: ordered TF to start @ 10ml/hr, advance per MD. Goal 45 ml/hr will meet estimated needs.

## 2023-02-02 NOTE — PLAN OF CARE
Goal Outcome Evaluation:Requested Oxycodone/Robaxin with relief.Home meds given for headache with little relief.C/O ongoing nausea,increased in nausea with activity,Relief with Phenergan.Ambulated to blevins,in chair.Denied flatus.Continue to monitor Phos replacement per orders.

## 2023-02-02 NOTE — PHARMACY-ADMISSION MEDICATION HISTORY
Admission Medication History Completed by Pharmacy    See [facility] Admission Navigator for allergy information, preferred outpatient pharmacy, prior to admission medications and immunization status.     Medication History Sources:     Patient    Fill history    Changes made to PTA medication list (reason):    Added: montelukast    Deleted:   o Acetaminophen duplicate entries  o Caffeine citrate (patient reports no longer using)  o Promethazine tablets (patient uses solution only)  o Methocarbamol 750mg entry (patient uses 500mg doses)  o Oxycodone solution (patient no longer uses)    Changed:   o Cyclobenzaprine (changed route to G tube)  o Levothyroxine (changed from liquid to tablet due to cost)  o Omeprazole (changed from liquid to tablet due to cost)    Additional Information:    None    Prior to Admission medications    Medication Sig Last Dose Taking? Auth Provider Long Term End Date   acarbose (PRECOSE) 100 MG tablet Take 1 tablet (100 mg) by mouth 3 times daily (with meals) 1/30/2023 at 1700 Yes Gloria Melissa MD Yes    acetaminophen (TYLENOL) 325 MG/10.15ML solution 20.3 mLs (650 mg) by Per J Tube route every 6 hours as needed for mild pain or fever Past Week Yes Elver Bauer MD     amylase-lipase-protease (CREON) 23214-37268 units CPEP per EC capsule Take 3-4 capsules by mouth 3 times daily (with meals) With oral meals 1/30/2023 at 0600 Yes Rosanne Marti PA-C No    cyclobenzaprine (FLEXERIL) 10 MG tablet 10 mg by Per G Tube route 2 times daily as needed for muscle spasms 1/30/2023 Yes Unknown, Entered By History No    diphenhydrAMINE (BENADRYL ALLERGY) 25 MG capsule Take 1 capsule (25 mg) by mouth every 6 hours as needed for itching or allergies 1/30/2023 at 0900 Yes Nestor Phoenix MD     estradiol (VAGIFEM) 10 MCG TABS vaginal tablet INSERT 1 TABLET(10 MCG) VAGINALLY 2 TIMES A WEEK Past Week Yes Latasha Paul APRN CNP     famotidine (PEPCID) 40 MG/5ML suspension 2.5 mLs (20  mg) by Per J Tube route daily 1/30/2023 at 0700 Yes Elver Bauer MD No    glucagon 1 MG kit Give 0.1 to 0.15mg( 10-15 units on Insulin sryinge) subcutaneous  every 15 minutes PRN for hypoglycemia. Remix new kit q24hr. Needs up to 3 kit/week. Past Week Yes Gloria Melissa MD Yes    ibuprofen (ADVIL/MOTRIN) 400 MG tablet Take 1 tablet (400 mg) by mouth every 6 hours as needed for moderate pain More than a month Yes Altagracia العلي PA-C No    levothyroxine (SYNTHROID/LEVOTHROID) 137 MCG tablet 137 mcg by Per G Tube route daily 1/31/2023 Yes Unknown, Entered By History Yes    Lidocaine (LIDOCARE) 4 % Patch Place 1 patch onto the skin every 24 hours Apply patch to abdomen once daily as needed. Remove after 12 hours. To prevent lidocaine toxicity, should be patch free for 12 hrs daily. 1/31/2023 at 0700 Yes Elver Bauer MD No    methocarbamol (ROBAXIN) 500 MG tablet 1 tablet (500 mg) by Per Feeding Tube route 4 times daily as needed for muscle spasms 1/31/2023 at 0700 Yes Prabhjot Kaufman MD     montelukast (SINGULAIR) 10 MG tablet 10 mg by Per G Tube route At Bedtime More than a month Yes Unknown, Entered By History No    omeprazole (PRILOSEC) 20 MG DR capsule 20 mg At Bedtime Through the J tube and clamp afterwards. 1/30/2023 at 2100 Yes Unknown, Entered By History No    prochlorperazine (COMPAZINE) 10 MG tablet TAKE 1/2 TABLET(5 MG) BY MOUTH EVERY 6 HOURS AS NEEDED FOR NAUSEA OR VOMITING 1/31/2023 at 0700 Yes Vijaya Genao MD     promethazine (PHENERGAN) 6.25 MG/5ML syrup 20 mLs (25 mg) by Per Feeding Tube route nightly as needed for nausea 1/30/2023 at 2100 Yes Elver Bauer MD     Prucalopride Succinate 2 MG TABS Take 1 tablet (2 mg) by mouth daily More than a month Yes Rosanne Marti PA-C     scopolamine (TRANSDERM) 1 MG/3DAYS 72 hr patch Place 1 patch onto the skin every 72 hours 1/31/2023 at 1255 Yes Rosanne Marti PA-C No   "  sennosides (SENOKOT) 8.8 MG/5ML syrup 5 mLs by Per J Tube route 2 times daily 1/30/2023 at 2100 Yes Elver Bauer MD     ZOLMitriptan (ZOMIG-ZMT) 5 MG ODT Take 1 tablet (5 mg) by mouth at onset of headache for migraine May repeat in 2 hours. Max 2 tablets/24 hours. Past Week Yes Rachelle العلي APRN CNP Yes    blood glucose (PAT CONTOUR NEXT) test strip Use to test blood sugar 6 times daily or as directed.   Gloria Melissa MD     blood glucose monitoring (PAT MICROLET) lancets Use to test blood sugar 6-8 times daily or as directed.   Gloria Melissa MD     Continuous Blood Gluc Sensor (FREESTYLE LISA 2 SENSOR) MISC Inject 1 patch into the skin every 14 days   Gloria Melissa MD     glucose 40 % GEL gel Take 15-30 g by mouth every 15 minutes as needed for low blood sugar   Melissa Sood PA-C     Hydroactive Dressings (TEGADERM HYDROCOLLOID THIN) MISC Use under sensor site , change every 14 days   Gloria Melissa MD     insulin syringe-needle U-100 (30G X 1/2\" 0.3 ML) 30G X 1/2\" 0.3 ML miscellaneous Use 3 syringes daily or as directed.   Gloria Melissa MD     Nutritional Supplements (BOOST HIGH PROTEIN) LIQD After above baseline labs are drawn, give: 6 mL/kg to maximum of 360 mL; the beverage is to be consumed within 5 minutes.   Gloria Melissa MD No    order for DME Equipment being ordered:Ruiz Rivas MD     Sharps Container (BD SHARPS ) MISC 1 Container as needed   Melissa Sood PA-C     STATIN NOT PRESCRIBED (INTENTIONAL) Previous liver issues, risks vs benefits felt to not warrant statin.    Discussed Oct 2022 visit   Rossi Andrade MD Yes        Date completed: 02/02/23    Medication history completed by: Luis Barraza [facility]  "

## 2023-02-02 NOTE — PLAN OF CARE
No acute events overnight. Patient nauseas, not relieved with compazine, given promethazine with improvement. Given tylenol once. PCA discontinued, oxy per J-tube ordered. Given sumatriptan for headache. Scopolamine patch replaced. Biotene swish and spit ordered. J-tube and G-tube open to gravity. LR at 100. Piedra with good output, cath care provided. Lungs clear and diminished. A&Ox4. Vitally stable.

## 2023-02-03 ENCOUNTER — APPOINTMENT (OUTPATIENT)
Dept: OCCUPATIONAL THERAPY | Facility: CLINIC | Age: 58
End: 2023-02-03
Attending: INTERNAL MEDICINE
Payer: COMMERCIAL

## 2023-02-03 LAB
ANION GAP SERPL CALCULATED.3IONS-SCNC: 10 MMOL/L (ref 7–15)
BUN SERPL-MCNC: 6 MG/DL (ref 6–20)
CALCIUM SERPL-MCNC: 8.7 MG/DL (ref 8.6–10)
CHLORIDE SERPL-SCNC: 101 MMOL/L (ref 98–107)
CREAT SERPL-MCNC: 0.46 MG/DL (ref 0.51–0.95)
DEPRECATED HCO3 PLAS-SCNC: 28 MMOL/L (ref 22–29)
ERYTHROCYTE [DISTWIDTH] IN BLOOD BY AUTOMATED COUNT: 13.1 % (ref 10–15)
GFR SERPL CREATININE-BSD FRML MDRD: >90 ML/MIN/1.73M2
GLUCOSE SERPL-MCNC: 102 MG/DL (ref 70–99)
HCT VFR BLD AUTO: 36.3 % (ref 35–47)
HGB BLD-MCNC: 11.7 G/DL (ref 11.7–15.7)
MAGNESIUM SERPL-MCNC: 1.6 MG/DL (ref 1.7–2.3)
MCH RBC QN AUTO: 31.1 PG (ref 26.5–33)
MCHC RBC AUTO-ENTMCNC: 32.2 G/DL (ref 31.5–36.5)
MCV RBC AUTO: 97 FL (ref 78–100)
PHOSPHATE SERPL-MCNC: 1.8 MG/DL (ref 2.5–4.5)
PLATELET # BLD AUTO: 320 10E3/UL (ref 150–450)
POTASSIUM SERPL-SCNC: 3.8 MMOL/L (ref 3.4–5.3)
RBC # BLD AUTO: 3.76 10E6/UL (ref 3.8–5.2)
SODIUM SERPL-SCNC: 139 MMOL/L (ref 136–145)
WBC # BLD AUTO: 11 10E3/UL (ref 4–11)

## 2023-02-03 PROCEDURE — 258N000003 HC RX IP 258 OP 636: Performed by: SURGERY

## 2023-02-03 PROCEDURE — 97530 THERAPEUTIC ACTIVITIES: CPT | Mod: GO

## 2023-02-03 PROCEDURE — 84100 ASSAY OF PHOSPHORUS: CPT | Performed by: SURGERY

## 2023-02-03 PROCEDURE — 250N000011 HC RX IP 250 OP 636: Performed by: SURGERY

## 2023-02-03 PROCEDURE — 97535 SELF CARE MNGMENT TRAINING: CPT | Mod: GO

## 2023-02-03 PROCEDURE — 250N000011 HC RX IP 250 OP 636: Performed by: STUDENT IN AN ORGANIZED HEALTH CARE EDUCATION/TRAINING PROGRAM

## 2023-02-03 PROCEDURE — 250N000013 HC RX MED GY IP 250 OP 250 PS 637: Performed by: STUDENT IN AN ORGANIZED HEALTH CARE EDUCATION/TRAINING PROGRAM

## 2023-02-03 PROCEDURE — 250N000013 HC RX MED GY IP 250 OP 250 PS 637: Performed by: SURGERY

## 2023-02-03 PROCEDURE — 85041 AUTOMATED RBC COUNT: CPT | Performed by: STUDENT IN AN ORGANIZED HEALTH CARE EDUCATION/TRAINING PROGRAM

## 2023-02-03 PROCEDURE — 83735 ASSAY OF MAGNESIUM: CPT | Performed by: SURGERY

## 2023-02-03 PROCEDURE — 85014 HEMATOCRIT: CPT | Performed by: STUDENT IN AN ORGANIZED HEALTH CARE EDUCATION/TRAINING PROGRAM

## 2023-02-03 PROCEDURE — 80048 BASIC METABOLIC PNL TOTAL CA: CPT | Performed by: STUDENT IN AN ORGANIZED HEALTH CARE EDUCATION/TRAINING PROGRAM

## 2023-02-03 PROCEDURE — 120N000002 HC R&B MED SURG/OB UMMC

## 2023-02-03 PROCEDURE — 250N000011 HC RX IP 250 OP 636

## 2023-02-03 PROCEDURE — 250N000009 HC RX 250: Performed by: SURGERY

## 2023-02-03 PROCEDURE — 36591 DRAW BLOOD OFF VENOUS DEVICE: CPT | Performed by: STUDENT IN AN ORGANIZED HEALTH CARE EDUCATION/TRAINING PROGRAM

## 2023-02-03 PROCEDURE — 97165 OT EVAL LOW COMPLEX 30 MIN: CPT | Mod: GO

## 2023-02-03 PROCEDURE — 250N000013 HC RX MED GY IP 250 OP 250 PS 637

## 2023-02-03 RX ORDER — CAFFEINE CITRATE 20 MG/ML
200 SOLUTION ORAL ONCE
Status: COMPLETED | OUTPATIENT
Start: 2023-02-03 | End: 2023-02-03

## 2023-02-03 RX ORDER — DIPHENHYDRAMINE HCL 12.5MG/5ML
0.5 LIQUID (ML) ORAL EVERY 6 HOURS PRN
Status: DISCONTINUED | OUTPATIENT
Start: 2023-02-03 | End: 2023-02-05 | Stop reason: HOSPADM

## 2023-02-03 RX ADMIN — ENOXAPARIN SODIUM 40 MG: 40 INJECTION SUBCUTANEOUS at 12:47

## 2023-02-03 RX ADMIN — MULTIVITAMIN 15 ML: LIQUID ORAL at 08:23

## 2023-02-03 RX ADMIN — PROMETHAZINE HYDROCHLORIDE 6.25 MG: 25 INJECTION INTRAMUSCULAR; INTRAVENOUS at 17:15

## 2023-02-03 RX ADMIN — OXYCODONE HYDROCHLORIDE 5 MG: 5 SOLUTION ORAL at 01:47

## 2023-02-03 RX ADMIN — POTASSIUM PHOSPHATE, MONOBASIC POTASSIUM PHOSPHATE, DIBASIC 15 MMOL: 224; 236 INJECTION, SOLUTION, CONCENTRATE INTRAVENOUS at 15:32

## 2023-02-03 RX ADMIN — OXYCODONE HYDROCHLORIDE 5 MG: 5 SOLUTION ORAL at 08:23

## 2023-02-03 RX ADMIN — METHOCARBAMOL 500 MG: 100 INJECTION, SOLUTION INTRAMUSCULAR; INTRAVENOUS at 21:47

## 2023-02-03 RX ADMIN — Medication 10 ML: at 15:38

## 2023-02-03 RX ADMIN — OXYCODONE HYDROCHLORIDE 10 MG: 5 SOLUTION ORAL at 21:09

## 2023-02-03 RX ADMIN — CAFFEINE CITRATE 200 MG: 20 SOLUTION ORAL at 11:38

## 2023-02-03 RX ADMIN — PROCHLORPERAZINE EDISYLATE 10 MG: 5 INJECTION INTRAMUSCULAR; INTRAVENOUS at 06:03

## 2023-02-03 RX ADMIN — SODIUM CHLORIDE, POTASSIUM CHLORIDE, SODIUM LACTATE AND CALCIUM CHLORIDE 1000 ML: 600; 310; 30; 20 INJECTION, SOLUTION INTRAVENOUS at 10:16

## 2023-02-03 RX ADMIN — FAMOTIDINE 20 MG: 40 POWDER, FOR SUSPENSION ORAL at 08:23

## 2023-02-03 RX ADMIN — METHOCARBAMOL 500 MG: 100 INJECTION, SOLUTION INTRAMUSCULAR; INTRAVENOUS at 11:40

## 2023-02-03 RX ADMIN — PROCHLORPERAZINE EDISYLATE 10 MG: 5 INJECTION INTRAMUSCULAR; INTRAVENOUS at 21:46

## 2023-02-03 RX ADMIN — LEVOTHYROXINE SODIUM 137 MCG: 0.14 TABLET ORAL at 08:30

## 2023-02-03 RX ADMIN — Medication 10 ML: at 21:47

## 2023-02-03 RX ADMIN — OXYCODONE HYDROCHLORIDE 10 MG: 5 SOLUTION ORAL at 16:56

## 2023-02-03 RX ADMIN — Medication 10 ML: at 01:47

## 2023-02-03 RX ADMIN — Medication 10 ML: at 08:30

## 2023-02-03 RX ADMIN — DIPHENHYDRAMINE HYDROCHLORIDE 30 MG: 25 SOLUTION ORAL at 19:03

## 2023-02-03 RX ADMIN — OXYCODONE HYDROCHLORIDE 10 MG: 5 SOLUTION ORAL at 12:47

## 2023-02-03 ASSESSMENT — ACTIVITIES OF DAILY LIVING (ADL)
ADLS_ACUITY_SCORE: 33
ADLS_ACUITY_SCORE: 29
ADLS_ACUITY_SCORE: 33

## 2023-02-03 NOTE — PLAN OF CARE
Goal Outcome Evaluation:      Plan of Care Reviewed With: patient    Overall Patient Progress: no change    Outcome Evaluation: A&Ox4, tachycardic ~low 100s and on RA. Right port clean, dry and intact. Midline incision CDI. Jtube with tube feeding 10 mL/hr CDI. Gtube to gravity CDI. Bowel sound hypoactive all four quadrants. No flatus and no BM this shift. PRN Oxycodone for pain, Robaxin for muscle spasm and Phenergan for nausea throughout the night. Catheter producing adequate urine. NPO except for ice chips.

## 2023-02-03 NOTE — PROGRESS NOTES
"   02/03/23 0956   Appointment Info   Signing Clinician's Name / Credentials (OT) Jessica Leyva, OTR/L   Rehab Comments (OT) Abdominal precautions   Living Environment   People in Home spouse   Current Living Arrangements house   Home Accessibility stairs to enter home;stairs within home   Number of Stairs, Main Entrance 4   Stair Railings, Main Entrance railings safe and in good condition   Number of Stairs, Within Home, Primary other (see comments)   Stair Railings, Within Home, Primary railings on both sides of stairs;railings safe and in good condition   Transportation Anticipated family or friend will provide   Living Environment Comments Pt reports living with  who is able to assist as needed. 14 stairs up to shower.   Self-Care   Usual Activity Tolerance good   Current Activity Tolerance moderate   Regular Exercise Yes   Activity/Exercise Type walking   Exercise Amount/Frequency 3-5 times/wk   Equipment Currently Used at Home dressing device   Fall history within last six months no   Activity/Exercise/Self-Care Comment IND with ADLs at baseline. Has reacher at home but does not typically use.   Instrumental Activities of Daily Living (IADL)   Previous Responsibilities meal prep;laundry;housekeeping;medication management;shopping;yardwork;driving;work   IADL Comments Reports IND at baseline however spouse has been assisting with heavy IADLs recently 2/2 abd precautions. Works from home   General Information   Onset of Illness/Injury or Date of Surgery 01/31/23   Referring Physician Martha Rubi MD   Patient/Family Therapy Goal Statement (OT) To go home   Additional Occupational Profile Info/Pertinent History of Current Problem Per chart: 58yo F s/p ex lap, BAUDILIO, perforated J-jejunostomy site resection and replacement of J-jejunostomy site.\"   Existing Precautions/Restrictions abdominal   Left Upper Extremity (Weight-bearing Status) partial weight-bearing (PWB)  (<10#s)   Right Upper Extremity " (Weight-bearing Status) partial weight-bearing (PWB)  (<10#s)   Left Lower Extremity (Weight-bearing Status) full weight-bearing (FWB)   Right Lower Extremity (Weight-bearing Status) full weight-bearing (FWB)   General Observations and Info Up with assist, highly motivated to return home   Cognitive Status Examination   Orientation Status orientation to person, place and time   Cognitive Status Comments Pt appears grossly cognitively intact throughout obs and interview   Visual Perception   Visual Impairment/Limitations corrective lenses full-time   Sensory   Sensory Comments numbness around incision   Range of Motion Comprehensive   General Range of Motion no range of motion deficits identified   Strength Comprehensive (MMT)   Comment, General Manual Muscle Testing (MMT) Assessment Not formally assessed, generalized weakness   Coordination   Upper Extremity Coordination No deficits were identified   Transfers   Transfers bed-chair transfer   Transfer Skill: Bed to Chair/Chair to Bed   Bed-Chair [place] (Transfers) supervision   Transfer Comments SBA   Sit-Stand Transfer   Sit-Stand [place] (Transfers) supervision   Sit/Stand Transfer Comments SBA   Activities of Daily Living   BADL Assessment/Intervention lower body dressing;toileting;bathing;grooming   Bathing Assessment/Intervention   [place] Level (Bathing) minimum assist (75% patient effort)   Comment, (Bathing) Per clinical reasoning   Lower Body Dressing Assessment/Training   [place] Level (Lower Body Dressing) maximum assist (25% patient effort)   Comment, (Lower Body Dressing) Per clinical reasoning   Grooming Assessment/Training   [place] Level (Grooming) supervision   Comment, (Grooming) Per clincal reasoning, SBA   Toileting   Comment, (Toileting) Per clinical reasoning   [place] Level (Toileting) minimum assist (75% patient effort)   Clinical Impression   Criteria for Skilled Therapeutic Interventions Met (OT) Yes,  treatment indicated   OT Diagnosis Decreased engagement in ADLs/IADLs now with post surgical prec.   OT Problem List-Impairments impacting ADL problems related to;activity tolerance impaired   Assessment of Occupational Performance 3-5 Performance Deficits   Identified Performance Deficits dressing, bathing, toileting, home making, work, leisure.   Planned Therapy Interventions (OT) ADL retraining;IADL retraining;bed mobility training;strengthening;transfer training;home program guidelines;progressive activity/exercise;risk factor education   Clinical Decision Making Complexity (OT) low complexity   Risk & Benefits of therapy have been explained evaluation/treatment results reviewed;care plan/treatment goals reviewed;risks/benefits reviewed;current/potential barriers reviewed;participants voiced agreement with care plan;participants included;patient   Clinical Impression Comments Pt below baseline for ADLs/IADLs with post surgical precautions. Recommend continued OT services to progress IND and safety within prec for ADLs/IADLs   OT Total Evaluation Time   OT Eval, Low Complexity Minutes (85327) 8   OT Goals   Therapy Frequency (OT) 5 times/wk   OT Predicted Duration/Target Date for Goal Attainment 02/10/23   OT Goals Lower Body Dressing;Toilet Transfer/Toileting;OT Goal 1;OT Goal 2   OT: Lower Body Dressing Modified independent;within precautions;including set-up/clothing retrieval   OT: Toilet Transfer/Toileting Independent;cleaning and garment management;within precautions   OT: Goal 1 Pt will verbalize 100% of post surgical precautions to progress safety with ADLs.   OT: Goal 2 Pt will demonstrating SBA for tub transfer by d/c.   Interventions   Interventions Quick Adds Self-Care/Home Management;Therapeutic Activity   Self-Care/Home Management   Self-Care/Home Mgmt/ADL, Compensatory, Meal Prep Minutes (18108) 24   Symptoms Noted During/After Treatment (Meal Preparation/Planning Training) fatigue   Treatment  Detail/Skilled Intervention Pt greeted in bedside chair. Educated/reviewed abdominal precautions for pt to follow for at least 6 weeks:  No lifting more than 10 lbs. Minimize activities that cause stress/strain to abdominal muscles, including no breath holding with activity or straining to have a bowel movement. Pt also instructed in how to splint the abdominal incision to help lessen pain during coughing and sneezing.  Instructed pt how to adapt transfers (including logroll technique for sup <> sit), bathing, & dressing in accordance with these precautions. Pt verbalizing understanding and remembering from previous surgery. For increased independence to complete ADLs and improved fatigue management, educated pt on energy conservation strategies. Educated pt to organize day into heavy vs light tasks, schedule rest breaks, and examples of how to modify ADLs to increase endurance (ex. Completing dressing in sitting, sliding objects across the counter vs carrying, etc.). Educated pt on importance of prioritizing tasks. Educated on use of reacher for LB dressing with pt demonstrating use with SBA and VC for sequencing. Educated on using figure 4 position for donning socks as alterternative per pt preference. Left end of session in bed with call light in reach and all current needs met.   Therapeutic Activities   Therapeutic Activity Minutes (93932) 10   Symptoms noted during/after treatment fatigue;increased pain   Treatment Detail/Skilled Intervention Facilitated functional ambulation in hallway to progress activity tolerance for home based distances and ADLs. Pt STS with 1 VC for safety. Completed ~200' with bilateral support on IV pole. Fatigues quickly and required short standing rest break. Upon return to room, facilitated log roll with SBA back to bed and 1 VC for sequencing to progress IND with functional transfers within prec.   OT Discharge Planning   OT Plan tub transfer, Standing ADLs, d/c OT   OT Discharge  Recommendation (DC Rec) home with assist   OT Rationale for DC Rec Pt below basline for ADLs/IADLs limited by pain and post surgical precautions. Anticipate will be safe to d/c to home with assist from family for heavy IADLs.   OT Brief overview of current status SBA   Total Session Time   Timed Code Treatment Minutes 34   Total Session Time (sum of timed and untimed services) 42

## 2023-02-03 NOTE — PROGRESS NOTES
[facility]    Progress Note - EGS Service       Date of Admission:  1/31/2023    Assessment & Plan: Surgery   Dinora Mcghee is a 58yo F who came in for J-tube and G-tube exchange on 1/31. Later that evening, Ms. Loo who was having significant postoperative pain and discomfort.  X-rays including fluoroscopy films with contrast infused through the J-tube showed that there was contrast leaking out of the jejunum and into the peritoneum. RTOR for exploratory laparotomy and repair of small intestine perforation. Now recovering on the floor.     -NPO, mIVF, multimodal pain control  -Increase TF through J-tube at 20ml/hr; if tolerating in afternoon increase to 30ml/hr  -Only liquid medications through NJ, otherwise NJ and G-tube to gravity.   -Piedra out  -PT/OT  -pulmonary hygiene, ISS  -nausea control          Diet: NPO for Medical/Clinical Reasons Except for: Ice Chips  Adult Formula Drip Feeding: Continuous Vital 1.5; Jejunostomy; Goal Rate: 45 ml/hr.  Start @ 20 ml/hr on 2/3.  Do not advance unless okayed by gen surg provider.; mL/hr; See Relizorb cartridge order: use 1 cartridge over 24 hr w/ TF rates 10-20 ml...    DVT Prophylaxis: Enoxaparin (Lovenox) SQ  Piedra Catheter: Not present  Lines: PRESENT      Port A Cath Single 12/09/22 Right Chest wall-Site Assessment: WDL      Drains: PRESENT         - 2 Biliary Drain(s)   Cardiac Monitoring: None  Code Status: Full Code      Clinically Significant Risk Factors            # Hypomagnesemia: Lowest Mg = 1.6 mg/dL in last 2 days, will replace as needed                     Disposition Plan      Expected Discharge Date: 02/06/2023      Destination: home;home with family;home with help/services           The patient's care was discussed with the chief resident who will discuss with attending.    Josse Dao MD  [facility]  Non-urgent messages: Securely message with Vocera (more  info)  Text page via [employer] Paging/Directory     ______________________________________________________________________    Interval History   NAOE. Feels better this morning. Headache is improved, pain is controlled. No N/V.     Physical Exam   Vital Signs: Temp: 97.7  F (36.5  C) Temp src: Oral BP: 117/70 Pulse: 104   Resp: 16 SpO2: 96 % O2 Device: None (Room air)    Weight: 132 lbs 3.2 oz    Intake/Output Summary (Last 24 hours) at 2/3/2023 1415  Last data filed at 2/3/2023 1254  Gross per 24 hour   Intake 2250 ml   Output 2850 ml   Net -600 ml     Gen: A&O x3, NAD   Resp: NLB on RA  CV: rrr, wwp  Abdomen: soft, tender non-focally, non-distended, G-tube in RUQ to gravity, J-tube in LUQ for TF, midline incision with dressing in place, no shadowing on dressing  Extremities: no edema noted, wwp, SCDs in place    Data     I have personally reviewed the following data over the past 24 hrs:    11.0  \   11.7   / 320     139 101 6.0 /  102 (H)   3.8 28 0.46 (L) \       Imaging results reviewed over the past 24 hrs:   No results found for this or any previous visit (from the past 24 hour(s)).

## 2023-02-04 ENCOUNTER — APPOINTMENT (OUTPATIENT)
Dept: PHYSICAL THERAPY | Facility: CLINIC | Age: 58
End: 2023-02-04
Attending: INTERNAL MEDICINE
Payer: COMMERCIAL

## 2023-02-04 LAB
HOLD SPECIMEN: NORMAL
MAGNESIUM SERPL-MCNC: 1.6 MG/DL (ref 1.7–2.3)
PHOSPHATE SERPL-MCNC: 3.4 MG/DL (ref 2.5–4.5)
POTASSIUM SERPL-SCNC: 3.7 MMOL/L (ref 3.4–5.3)

## 2023-02-04 PROCEDURE — 250N000013 HC RX MED GY IP 250 OP 250 PS 637: Performed by: SURGERY

## 2023-02-04 PROCEDURE — 84132 ASSAY OF SERUM POTASSIUM: CPT | Performed by: SURGERY

## 2023-02-04 PROCEDURE — 120N000002 HC R&B MED SURG/OB UMMC

## 2023-02-04 PROCEDURE — 250N000013 HC RX MED GY IP 250 OP 250 PS 637

## 2023-02-04 PROCEDURE — 258N000003 HC RX IP 258 OP 636: Performed by: SURGERY

## 2023-02-04 PROCEDURE — 250N000013 HC RX MED GY IP 250 OP 250 PS 637: Performed by: STUDENT IN AN ORGANIZED HEALTH CARE EDUCATION/TRAINING PROGRAM

## 2023-02-04 PROCEDURE — 97116 GAIT TRAINING THERAPY: CPT | Mod: GP

## 2023-02-04 PROCEDURE — 84100 ASSAY OF PHOSPHORUS: CPT | Performed by: SURGERY

## 2023-02-04 PROCEDURE — 250N000011 HC RX IP 250 OP 636: Performed by: SURGERY

## 2023-02-04 PROCEDURE — 97530 THERAPEUTIC ACTIVITIES: CPT | Mod: GP

## 2023-02-04 PROCEDURE — 250N000011 HC RX IP 250 OP 636

## 2023-02-04 PROCEDURE — 36591 DRAW BLOOD OFF VENOUS DEVICE: CPT | Performed by: SURGERY

## 2023-02-04 PROCEDURE — 83735 ASSAY OF MAGNESIUM: CPT | Performed by: SURGERY

## 2023-02-04 PROCEDURE — 250N000011 HC RX IP 250 OP 636: Performed by: STUDENT IN AN ORGANIZED HEALTH CARE EDUCATION/TRAINING PROGRAM

## 2023-02-04 RX ORDER — CAFFEINE CITRATE 20 MG/ML
200 SOLUTION ORAL ONCE
Status: COMPLETED | OUTPATIENT
Start: 2023-02-04 | End: 2023-02-04

## 2023-02-04 RX ORDER — HEPARIN SODIUM,PORCINE 10 UNIT/ML
3 VIAL (ML) INTRAVENOUS
Status: DISCONTINUED | OUTPATIENT
Start: 2023-02-04 | End: 2023-02-05 | Stop reason: HOSPADM

## 2023-02-04 RX ORDER — OXYCODONE HCL 5 MG/5 ML
5-10 SOLUTION, ORAL ORAL EVERY 4 HOURS PRN
Status: DISCONTINUED | OUTPATIENT
Start: 2023-02-04 | End: 2023-02-05 | Stop reason: HOSPADM

## 2023-02-04 RX ORDER — METHOCARBAMOL 100 MG/ML
500 INJECTION, SOLUTION INTRAMUSCULAR; INTRAVENOUS 4 TIMES DAILY PRN
Status: DISCONTINUED | OUTPATIENT
Start: 2023-02-04 | End: 2023-02-05 | Stop reason: HOSPADM

## 2023-02-04 RX ADMIN — METHOCARBAMOL 500 MG: 100 INJECTION, SOLUTION INTRAMUSCULAR; INTRAVENOUS at 09:38

## 2023-02-04 RX ADMIN — OXYCODONE HYDROCHLORIDE 10 MG: 5 SOLUTION ORAL at 06:07

## 2023-02-04 RX ADMIN — ENOXAPARIN SODIUM 40 MG: 40 INJECTION SUBCUTANEOUS at 12:43

## 2023-02-04 RX ADMIN — PROMETHAZINE HYDROCHLORIDE 6.25 MG: 25 INJECTION INTRAMUSCULAR; INTRAVENOUS at 05:44

## 2023-02-04 RX ADMIN — OXYCODONE HYDROCHLORIDE 10 MG: 5 SOLUTION ORAL at 01:57

## 2023-02-04 RX ADMIN — CAFFEINE CITRATE 200 MG: 60 INJECTION INTRAVENOUS at 10:23

## 2023-02-04 RX ADMIN — Medication 3 ML: at 02:05

## 2023-02-04 RX ADMIN — Medication 10 ML: at 12:48

## 2023-02-04 RX ADMIN — Medication 10 ML: at 01:57

## 2023-02-04 RX ADMIN — SODIUM CHLORIDE, POTASSIUM CHLORIDE, SODIUM LACTATE AND CALCIUM CHLORIDE 1000 ML: 600; 310; 30; 20 INJECTION, SOLUTION INTRAVENOUS at 08:04

## 2023-02-04 RX ADMIN — OXYCODONE HYDROCHLORIDE 10 MG: 5 SOLUTION ORAL at 19:59

## 2023-02-04 RX ADMIN — Medication 10 ML: at 22:41

## 2023-02-04 RX ADMIN — METHOCARBAMOL 500 MG: 100 INJECTION, SOLUTION INTRAMUSCULAR; INTRAVENOUS at 18:32

## 2023-02-04 RX ADMIN — SENNOSIDES 5 ML: 8.8 LIQUID ORAL at 10:23

## 2023-02-04 RX ADMIN — OXYCODONE HYDROCHLORIDE 10 MG: 5 SOLUTION ORAL at 10:23

## 2023-02-04 RX ADMIN — Medication 10 ML: at 19:59

## 2023-02-04 RX ADMIN — PROMETHAZINE HYDROCHLORIDE 6.25 MG: 25 INJECTION INTRAMUSCULAR; INTRAVENOUS at 19:59

## 2023-02-04 RX ADMIN — LEVOTHYROXINE SODIUM 137 MCG: 0.14 TABLET ORAL at 08:13

## 2023-02-04 RX ADMIN — FAMOTIDINE 20 MG: 40 POWDER, FOR SUSPENSION ORAL at 09:05

## 2023-02-04 RX ADMIN — MULTIVITAMIN 15 ML: LIQUID ORAL at 09:05

## 2023-02-04 RX ADMIN — OXYCODONE HYDROCHLORIDE 10 MG: 5 SOLUTION ORAL at 16:00

## 2023-02-04 RX ADMIN — Medication 3 ML: at 12:43

## 2023-02-04 ASSESSMENT — ACTIVITIES OF DAILY LIVING (ADL)
ADLS_ACUITY_SCORE: 30
ADLS_ACUITY_SCORE: 33
ADLS_ACUITY_SCORE: 30

## 2023-02-04 NOTE — PROGRESS NOTES
"Goal outcome evaluation:    Plan of Care Reviewed with: Patient  Overall Patient Progress: Improving    VS /66 (BP Location: Right arm)   Pulse 100   Temp 98.5  F (36.9  C) (Oral)   Resp 16   Ht 1.702 m (5' 7\")   Wt 60 kg (132 lb 3.2 oz)   SpO2 98%   BMI 20.71 kg/m     Activity: SBA  Neuros: A&O x4. Able to make needs known.  Cardiac: Tachycardic. Denies cardiac chest pain.  Respiratory: WDL. RA. Denies SOB  GI/: Voiding AUOP, No BM this shift. +flatus  Diet: NPO ex. Ice chips. TF currently @ 30 mL/hr per MD order  Skin/Incisions: Midline abdominal incision dressing CDI.  Lines/Drains: Jtube with TF. Gtube to gravity  Labs: Phos replaced.  Pain/nausea: 6-8/10 pain. Managed w/ PRN oxycodone, PRN robaxin  "

## 2023-02-04 NOTE — PROVIDER NOTIFICATION
Paged [employer] provider regarding J-tube site dressing moderately saturated with tan drainage. Patient afraid that J-tube is going to come out. Stopped TF currently.

## 2023-02-04 NOTE — PROGRESS NOTES
[facility]    Progress Note - EGS Service       Date of Admission:  1/31/2023    Assessment & Plan: Surgery   Dinora Mcghee is a 56yo F who came in for J-tube and G-tube exchange on 1/31. Later that evening, Ms. Loo who was having significant postoperative pain and discomfort.  X-rays including fluoroscopy films with contrast infused through the J-tube showed that there was contrast leaking out of the jejunum and into the peritoneum. RTOR for exploratory laparotomy and repair of small intestine perforation. Now recovering on the floor.     -NPO, mIVF, multimodal pain control  -Increase TF through J-tube at 40ml/hr if tolerating 30ml/hr restarted this AM  -Only liquid medications through NJ, otherwise NJ and G-tube to gravity.   -PT  -pulmonary hygiene, ISS  -nausea control        Diet: NPO for Medical/Clinical Reasons Except for: Ice Chips  Diet  Adult Formula Drip Feeding: Continuous Vital 1.5; Jejunostomy; Goal Rate: 45 ml/hr.  Increase to 30 ml/hr on 2/3 afternoon.  Do not advance unless okayed by gen surg provider.; mL/hr; See Relizorb cartridge order: use 1 cartridge over 24 hr w/ TF ...    DVT Prophylaxis: Enoxaparin (Lovenox) SQ  P[place]ra Catheter: Not present  Lines: PRESENT      Port A Cath Single 12/09/22 Right Chest wall-Site Assessment: WDL      Drains: PRESENT         - 2 Biliary Drain(s)   Cardiac Monitoring: None  Code Status: Full Code      Clinically Significant Risk Factors            # Hypomagnesemia: Lowest Mg = 1.6 mg/dL in last 2 days, will replace as needed                     Disposition Plan     Expected Discharge Date: 02/05/2023      Destination: home;home with family;home with help/services           The patient's care was discussed with the chief resident who will discuss with attending.    Josse Dao MD  [facility]  Non-urgent messages: Securely message with [employer] App (more info)  Text page  via [employer] Paging/Directory     ______________________________________________________________________    Interval History   J tube leaking overnight as expected. TF briefly held and restarted this AM. Slight worsening of nausea but no emesis. Able to ambulate yesterday.    Physical Exam   Vital Signs: Temp: 96.9  F (36.1  C) Temp src: Oral BP: 110/64 Pulse: 96   Resp: 18 SpO2: 96 % O2 Device: None (Room air)    Weight: 132 lbs 3.2 oz    Intake/Output Summary (Last 24 hours) at 2/4/2023 1526  Last data filed at 2/4/2023 1255  Gross per 24 hour   Intake 700 ml   Output 2280 ml   Net -1580 ml     Gen: A&O x3, NAD   Resp: NLB on RA  CV: rrr, wwp  Abdomen: soft, tender non-focally, non-distended, G-tube in RUQ to gravity, J-tube in LUQ for TF, J-tube dressing in place  Extremities: no edema noted, wwp, SCDs in place      Data     I have personally reviewed the following data over the past 24 hrs:    N/A  \   N/A   / N/A     N/A N/A N/A /  N/A   3.7 N/A N/A \       Imaging results reviewed over the past 24 hrs:   No results found for this or any previous visit (from the past 24 hour(s)).

## 2023-02-04 NOTE — PLAN OF CARE
"Vital signs:  Temp: 97.7  F (36.5  C) Temp src: Oral BP: 120/71 Pulse: 100   Resp: 16 SpO2: 96 % O2 Device: None (Room air)  Height: 170.2 cm (5' 7\") Weight: 60 kg (132 lb 3.2 oz)    3280-2352:    Activity: Up to bathroom with SBA.   Neuros: A & O x4. Neuro intact.   Cardiac: Slightly tachy in the low 100s. /71. Asymptomatic.   Respiratory: LS diminished in bases. O2 sats high 90s on RA. Denies SOB. Unlabored.   GI/: BS hypoactive, passing flatus, no BM. Voiding good amounts.  and 176.   Diet: NPO except ice chips. TF running at 30 mL/hr via J-tube. Leaking at J-tube site at 0500 moderately saturated with tan drainage, notified provider who saw patient and changed dressing. Restarted TF at 20mL/hr per provider's verbal orders. Changed Relizorb at 0600.   Skin: Midline incision stapled, JACKIE, no drainage. PRN Biotene x2 for oral cares.   Lines: Right chest port TKO.   Drains: G-tube to gravity 55cc green output.  Labs: Reviewed.   Pain/nausea: PRN oxycodone 10mg x3 and PRN IV Robaxin x1 effective for abdominal pain. PRN Compazine x1 and PRN Phenergan x1 for nausea effective.     New changes this shift: None.   Plan: Continue POC.       "

## 2023-02-04 NOTE — PROGRESS NOTES
"Goal outcome evaluation:    Plan of Care Reviewed with: Patient  Overall Patient Progress: No change    VS /64 (BP Location: Right arm)   Pulse 96   Temp 96.9  F (36.1  C) (Oral)   Resp 18   Ht 1.702 m (5' 7\")   Wt 60 kg (132 lb 3.2 oz)   SpO2 96%   BMI 20.71 kg/m     Activity: IND  Neuros: A&O x4. Able to make needs known.  Cardiac: WDL. Denies cardiac chest pain.  Respiratory: WDL. RA. Denies SOB  GI/: Voiding AUOP. No BM this shift. +flatus  Diet: NPO ex. Ice chips. Cont. TF @ 40 mL/hr  Skin/Incisions: Midline abdominal incision dressing CDI  Lines/Drains: (R) Port A Cath - HL. J-tube - TF + liquid meds. G-tube - gravity + crushed meds.  Labs: Reviewed.  Pain/nausea: 6-8/10 pain. Managed w/ PRN oxycodone, PRN robaxin  "

## 2023-02-05 ENCOUNTER — APPOINTMENT (OUTPATIENT)
Dept: OCCUPATIONAL THERAPY | Facility: CLINIC | Age: 58
End: 2023-02-05
Attending: INTERNAL MEDICINE
Payer: COMMERCIAL

## 2023-02-05 VITALS
OXYGEN SATURATION: 95 % | DIASTOLIC BLOOD PRESSURE: 68 MMHG | BODY MASS INDEX: 20.81 KG/M2 | RESPIRATION RATE: 18 BRPM | HEART RATE: 87 BPM | WEIGHT: 132.6 LBS | HEIGHT: 67 IN | TEMPERATURE: 97.4 F | SYSTOLIC BLOOD PRESSURE: 116 MMHG

## 2023-02-05 LAB
HOLD SPECIMEN: NORMAL
MAGNESIUM SERPL-MCNC: 1.7 MG/DL (ref 1.7–2.3)
PHOSPHATE SERPL-MCNC: 3.3 MG/DL (ref 2.5–4.5)
POTASSIUM SERPL-SCNC: 3.8 MMOL/L (ref 3.4–5.3)

## 2023-02-05 PROCEDURE — 84100 ASSAY OF PHOSPHORUS: CPT | Performed by: SURGERY

## 2023-02-05 PROCEDURE — 83735 ASSAY OF MAGNESIUM: CPT | Performed by: SURGERY

## 2023-02-05 PROCEDURE — 36591 DRAW BLOOD OFF VENOUS DEVICE: CPT | Performed by: SURGERY

## 2023-02-05 PROCEDURE — 250N000013 HC RX MED GY IP 250 OP 250 PS 637: Performed by: SURGERY

## 2023-02-05 PROCEDURE — 250N000011 HC RX IP 250 OP 636: Performed by: SURGERY

## 2023-02-05 PROCEDURE — 84132 ASSAY OF SERUM POTASSIUM: CPT | Performed by: SURGERY

## 2023-02-05 PROCEDURE — 258N000003 HC RX IP 258 OP 636: Performed by: SURGERY

## 2023-02-05 PROCEDURE — 250N000013 HC RX MED GY IP 250 OP 250 PS 637

## 2023-02-05 PROCEDURE — 97535 SELF CARE MNGMENT TRAINING: CPT | Mod: GO

## 2023-02-05 PROCEDURE — 250N000011 HC RX IP 250 OP 636: Performed by: STUDENT IN AN ORGANIZED HEALTH CARE EDUCATION/TRAINING PROGRAM

## 2023-02-05 PROCEDURE — 250N000013 HC RX MED GY IP 250 OP 250 PS 637: Performed by: STUDENT IN AN ORGANIZED HEALTH CARE EDUCATION/TRAINING PROGRAM

## 2023-02-05 PROCEDURE — 250N000009 HC RX 250

## 2023-02-05 RX ORDER — SODIUM CHLORIDE, SODIUM LACTATE, POTASSIUM CHLORIDE, CALCIUM CHLORIDE 600; 310; 30; 20 MG/100ML; MG/100ML; MG/100ML; MG/100ML
1000 INJECTION, SOLUTION INTRAVENOUS DAILY
Qty: 30000 ML | Refills: 0 | Status: SHIPPED | OUTPATIENT
Start: 2023-02-05 | End: 2023-03-07

## 2023-02-05 RX ORDER — ZINC OXIDE 20 %
OINTMENT (GRAM) TOPICAL
Status: DISCONTINUED | OUTPATIENT
Start: 2023-02-05 | End: 2023-02-05 | Stop reason: HOSPADM

## 2023-02-05 RX ORDER — ZINC OXIDE 20 %
OINTMENT (GRAM) TOPICAL
Qty: 500 G | Refills: 0 | Status: SHIPPED | OUTPATIENT
Start: 2023-02-05 | End: 2023-03-07

## 2023-02-05 RX ORDER — HEPARIN SODIUM (PORCINE) LOCK FLUSH IV SOLN 100 UNIT/ML 100 UNIT/ML
5-10 SOLUTION INTRAVENOUS
Status: DISCONTINUED | OUTPATIENT
Start: 2023-02-05 | End: 2023-02-05 | Stop reason: HOSPADM

## 2023-02-05 RX ORDER — ZINC OXIDE 200 MG/G
71 PASTE TOPICAL
Qty: 170 G | Refills: 0 | Status: SHIPPED | OUTPATIENT
Start: 2023-02-05

## 2023-02-05 RX ORDER — ZINC OXIDE 200 MG/G
PASTE TOPICAL
Status: DISCONTINUED | OUTPATIENT
Start: 2023-02-05 | End: 2023-02-05 | Stop reason: ALTCHOICE

## 2023-02-05 RX ORDER — OXYCODONE HCL 5 MG/5 ML
5-10 SOLUTION, ORAL ORAL EVERY 4 HOURS PRN
Qty: 100 ML | Refills: 0 | Status: SHIPPED | OUTPATIENT
Start: 2023-02-05 | End: 2023-02-08

## 2023-02-05 RX ADMIN — SCOPALAMINE 1 PATCH: 1 PATCH, EXTENDED RELEASE TRANSDERMAL at 06:12

## 2023-02-05 RX ADMIN — OXYCODONE HYDROCHLORIDE 10 MG: 5 SOLUTION ORAL at 06:22

## 2023-02-05 RX ADMIN — PROMETHAZINE HYDROCHLORIDE 6.25 MG: 25 INJECTION INTRAMUSCULAR; INTRAVENOUS at 06:22

## 2023-02-05 RX ADMIN — PROCHLORPERAZINE EDISYLATE 10 MG: 5 INJECTION INTRAMUSCULAR; INTRAVENOUS at 12:35

## 2023-02-05 RX ADMIN — OXYCODONE HYDROCHLORIDE 10 MG: 5 SOLUTION ORAL at 11:03

## 2023-02-05 RX ADMIN — Medication 10 ML: at 08:57

## 2023-02-05 RX ADMIN — SENNOSIDES 5 ML: 8.8 LIQUID ORAL at 08:57

## 2023-02-05 RX ADMIN — MULTIVITAMIN 15 ML: LIQUID ORAL at 08:48

## 2023-02-05 RX ADMIN — LEVOTHYROXINE SODIUM 137 MCG: 0.14 TABLET ORAL at 08:48

## 2023-02-05 RX ADMIN — OXYCODONE HYDROCHLORIDE 10 MG: 5 SOLUTION ORAL at 01:34

## 2023-02-05 RX ADMIN — Medication 5 ML: at 12:35

## 2023-02-05 RX ADMIN — METHOCARBAMOL 500 MG: 100 INJECTION, SOLUTION INTRAMUSCULAR; INTRAVENOUS at 09:54

## 2023-02-05 RX ADMIN — FAMOTIDINE 20 MG: 40 POWDER, FOR SUSPENSION ORAL at 08:48

## 2023-02-05 RX ADMIN — SODIUM CHLORIDE, POTASSIUM CHLORIDE, SODIUM LACTATE AND CALCIUM CHLORIDE 1000 ML: 600; 310; 30; 20 INJECTION, SOLUTION INTRAVENOUS at 08:48

## 2023-02-05 ASSESSMENT — ACTIVITIES OF DAILY LIVING (ADL)
ADLS_ACUITY_SCORE: 29
ADLS_ACUITY_SCORE: 29
ADLS_ACUITY_SCORE: 30
ADLS_ACUITY_SCORE: 27
ADLS_ACUITY_SCORE: 30

## 2023-02-05 NOTE — PLAN OF CARE
"Vital signs:  Temp: 98.1  F (36.7  C) Temp src: Oral BP: 109/63 Pulse: 86   Resp: 18 SpO2: 98 % O2 Device: None (Room air) Height: 170.2 cm (5' 7\") Weight: 60.1 kg (132 lb 9.6 oz)    0879-9275:    Activity: Up to bathroom with SBA.   Neuros: A & O x4. Neuro intact.   Cardiac: WDL. Asymptomatic.   Respiratory: LS diminished in bases. O2 sats high 90s on RA. Denies SOB. Unlabored.   GI/: BS+, passing flatus, no BM. Voiding good amounts.   Diet: NPO except ice chips. TF running at 40 mL/hr via J-tube with Relizorb attachment.   Skin: Midline incision stapled, JACKIE, no drainage. Small leak around J-tube. Applied ABD dressing with abdominal binder per patient request, tolerated well. PRN Biotene x2 for oral cares.   Lines: Right chest port TKO.   Drains: G-tube to gravity with 50cc green output.  Labs: Reviewed.   Pain/nausea: PRN oxycodone 10mg x2 effective for abdominal pain. PRN Phenergan x2 for nausea effective.     New changes this shift: None.   Plan: Continue POC.  "

## 2023-02-05 NOTE — PROGRESS NOTES
Care Management Discharge Note    Discharge Date: 02/05/2023  Discharge Disposition: Home,   Discharge Services: Home Care, Home Infusion  Discharge DME: None  Discharge Transportation: family or friend will provide  Patient/family educated on Medicare website which has current facility and service quality ratings: no  Education Provided on the Discharge Plan:  yes  Persons Notified of Discharge Plans: yes  Patient/Family in Agreement with the Plan: yes    Handoff Referral Completed: Yes    Additional Information:  RNCC received a call from [facility] confirming if pt will be discharging today and if IV Phenergan and LR would be resumed. RNCC reached out to the primary team who stated they would look at discharge orders and get it added if needed. RNCC notified by [facility] that primary team had changed pt's Phenergan from IV to PO and pt was not aware of it and wanted the IV form. RNCC reached out to general surgery again to get IV Phenergan ordered for pt. RNCC update [facility] regarding discharge and ensured discharge orders were faxed to them.     tSerling Swanson RN BSN  7C RN Care Coordinator   Ph: [phone]  Pager: [phone]

## 2023-02-05 NOTE — PROGRESS NOTES
Patient discharged home following hospital stay for: abd pain, GJ revision     Vital signs stable  Oxygen status: stable on RA   Patient tolerating TF @ 45mL/hr     Belongings sent with patient. R port site discontinued. Discharge meds to discharge pharmacy. AVS and education gone over with patient, signed copy in patient chart. Pt to follow up as outpatient and with PCP. Pt verbalized understanding of all education and information.

## 2023-02-05 NOTE — DISCHARGE SUMMARY
[facility]  Surgery Discharge Summary      Date of Admission:  1/31/2023  Date of Discharge:  2/5/2023  1:19 PM  Discharging Provider: Josse Dao MD  Discharge Service: EGS    Discharge Diagnoses   Perforated Viscus    Follow-ups Needed After Discharge       Unresulted Labs Ordered in the Past 30 Days of this Admission     Date and Time Order Name Status Description    1/31/2023  8:35 PM Surgical Pathology Exam In process       These results will be followed up and addressed in clinic    Discharge Disposition   Discharged to home  Condition at discharge: Stable  Hospital Course   Dinora Mcghee is a 58yo F who came in for J-tube and G-tube exchange on 1/31. Later that evening, Ms. Loo who was having significant postoperative pain and discomfort.  X-rays including fluoroscopy films with contrast infused through the J-tube showed that there was contrast leaking out of the jejunum and into the peritoneum. RTOR for exploratory laparotomy and repair of small intestine perforation. Able to increase TF to goal over a few days and control nausea and pain without IV pain meds. Patient was able to ambulate independently and void prior to discharge. Appropriate home cares and follow up were scheduled. Patient discharged on 2/5.       Consultations This Hospital Stay   OCCUPATIONAL THERAPY ADULT IP CONSULT  PHYSICAL THERAPY ADULT IP CONSULT  NUTRITION SERVICES ADULT IP CONSULT  PHARMACY IP CONSULT  CARE MANAGEMENT / SOCIAL WORK IP CONSULT    Code Status   Prior      Josse Dao MD  [facility] [address] [address]  Phone: [phone]  ______________________________________________________________________    Physical Exam   Vital Signs: Temp: 97.4  F (36.3  C) Temp src: Oral BP: 116/68 Pulse: 87   Resp: 18 SpO2: 95 % O2 Device: None (Room air)    Weight: 132 lbs 9.6 oz    Gen: A&O x3, NAD   Resp: NLB on RA  CV: rrr, wwp  Abdomen: soft,  tender non-focally, non-distended, G-tube in RUQ to gravity, J-tube in LUQ for TF, J-tube dressing in place  Extremities: no edema noted, wwp,       Primary Care Physician   Rossi Andrade    Discharge Orders      Home Care Referral      Home Infusion Referral      Shower/Bathing - Restrictions: Let water run over incisions and pat dry. No tub baths until bleeding stops.    Shower/Bathing - Restrictions:   - okay to shower after surgery. Let soap and water run over incision, do not scrub. Pat dry.   - Do not soak/submerge incision in bath tub, hot tub, pool, etc for at least 4 weeks while incision is healing.     Reason for your hospital stay    Bowel perforation     Activity    Resume light activity as tolerated (walking, light house work)  Do not lift greater than 15 lb or engage in strenuous activity for at least 4 weeks     Discharge Instructions    FOLLOW UP:  Please follow up in 2 weeks with Dr. Bauer. If you have not heard from us regarding this appointment or have questions, please call the General Surgery Clinic at [phone].     WHEN TO CALL OR SEEK CARE:  Please call or seek medical attention if you experience increasing abdominal pain, nausea, vomiting, increasing drainage from or spreading redness around your wounds, chills, or fever >101.5.   - During normal business hours, please contact the General Surgery clinic at [phone].  - If you are having troubles in the evenings, at night, or on weekends, call [phone] and ask to speak with surgery resident on call (residents are not able to prescribe or refill controlled substances such as narcotics over the phone)    ACTIVITY:  - Do not lift more than 15 pounds for 4 weeks after surgery.   - You may shower 48 hours after surgery but do not scrub incisions; simply let soapy water run over them. Pat dry.  - Do not soak in a bath tub or pool for 4 weeks after surgery.  - No driving while on narcotic pain medications and until you can safely  twist/turn and press a gas pedal without pain.     INCISIONS/DRESSINGS:  - Your incision was closed with staples, which will stay in place for at least 2 weeks after surgery. Incision can be left open to air. It is also okay to cover the incision with a dressing such as gauze and tape if this is more comfortable.   - J tube: keep tape or dressing OVER bumper, secure to skin at all times.     MEDICATIONS AND PAIN CONTROL:  - Please take a stool softener while taking narcotics to prevent constipation.  - We recommend you take Tylenol (acetaminophen) around the clock for baseline pain control and then oxycodone as needed for pain throughout the day and narcotics as needed for more severe breakthrough pain. Utilize adjunct pain medications such as muscle relaxers as well.     Tubes and drains    You are going home with the following tubes or drains: J tube.  Follow these instructions to care for your tube:  Zinc oxide on skin under bumper with one gauze strip to separate. Primapore over the top of the bumper for securing tube to skin. Change PRN or daily.     Diet    Follow this diet upon discharge: resume your previous diet and tube feeds       Significant Results and Procedures   Results for orders placed or performed during the hospital encounter of 01/31/23   XR Surgery GAMA L/T 5 Min Fluoro w Stills    Narrative    This exam was marked as non-reportable because it will not be read by a   radiologist or a [facility] non-radiologist provider.         XR Abdomen Flat and Decub    Narrative    EXAM: XR ABDOMEN FLAT AND DECUB  1/31/2023 6:49 PM     HISTORY:  Ro small bowel perforation after dilation and replacement of  J tube, post contrast       TECHNIQUE: Single frontal radiograph of the abdomen    COMPARISON:  CT abdomen 1/12/2023    FINDINGS:   AP and decubitus views of the abdomen. There is a gastric and  jejunostomy tube projecting over the right and left naomi-abdomen.    Surgical sutures and surgical staples are  seen overlying the right and  left abdomen. Contrast within the injected J-tubing appears to outline  bowel and is extraluminal. No evidence of free air.    Nonobstructive bowel gas pattern. No pneumatosis, portal venous gas.  No acute osseous anomaly.      Impression    IMPRESSION:  1. Contrast in the J-tube and left upper quadrant appears to persist  and is likely extraluminal. No free air.  2. Nonobstructive bowel gas pattern.    I have personally reviewed the examination and initial interpretation  and I agree with the findings.    TIN AMAYA MD         SYSTEM ID:  [id number]     *Note: Due to a large number of results and/or encounters for the requested time period, some results have not been displayed. A complete set of results can be found in Results Review.       Discharge Medications   Discharge Medication List as of 2/5/2023  3:40 PM      START taking these medications    Details   lactated ringers infusion Inject 1,000 mLs into the vein daily for 30 days, Disp-31198 mL, R-0, E-Prescribe      oxyCODONE (ROXICODONE) 5 MG/5ML solution 5-10 mLs (5-10 mg) by Per J Tube route every 4 hours as needed for moderate pain (4-6), Disp-100 mL, R-0, Local Print      promethazine 25 mg Inject 25 mg into the vein every 6 hours as needed for nausea, No Print Out      zinc oxide (DESITIN) 20 % external ointment Apply topically every hour as needed for irritationDisp-500 g, [id number]      zinc oxide - white petrolatum (CRITIC-AID THICK MOIST BARRIER) 20-51% PSTE topical paste Apply 71 g topically every hour as needed for rash (Under J tube bumper when needed for skin protection), Disp-170 g, R-0, E-Prescribe         CONTINUE these medications which have NOT CHANGED    Details   acarbose (PRECOSE) 100 MG tablet Take 1 tablet (100 mg) by mouth 3 times daily (with meals), Disp-90 tablet, R-3, E-Prescribe      acetaminophen (TYLENOL) 325 MG/10.15ML solution 20.3 mLs (650 mg) by Per J Tube route every 6 hours as needed  for mild pain or fever, Disp-473 mL, R-0, E-Prescribe      amylase-lipase-protease (CREON) 87578-95283 units CPEP per EC capsule Take 3-4 capsules by mouth 3 times daily (with meals) With oral meals, Disp-150 capsule, R-11, E-Prescribe      cyclobenzaprine (FLEXERIL) 10 MG tablet 10 mg by Per G Tube route 2 times daily as needed for muscle spasms, Historical      diphenhydrAMINE (BENADRYL ALLERGY) 25 MG capsule Take 1 capsule (25 mg) by mouth every 6 hours as needed for itching or allergies, Disp-56 capsule, R-0, No Print Out      estradiol (VAGIFEM) 10 MCG TABS vaginal tablet INSERT 1 TABLET(10 MCG) VAGINALLY 2 TIMES A WEEK, Disp-24 tablet, R-2, E-Prescribe      famotidine (PEPCID) 40 MG/5ML suspension 2.5 mLs (20 mg) by Per J Tube route daily, Disp-50 mL, R-3, E-Prescribe      glucagon 1 MG kit Give 0.1 to 0.15mg( 10-15 units on Insulin sryinge) subcutaneous  every 15 minutes PRN for hypoglycemia. Remix new kit q24hr. Needs up to 3 kit/week., Disp-10 each, R-3, E-PrescribeAbdulkadirl Lidya Gaffney Rn @ [phone] with questions.      ibuprofen (ADVIL/MOTRIN) 400 MG tablet Take 1 tablet (400 mg) by mouth every 6 hours as needed for moderate pain, Disp-30 tablet, R-0, E-Prescribe      levothyroxine (SYNTHROID/LEVOTHROID) 137 MCG tablet 137 mcg by Per G Tube route daily, Historical      Lidocaine (LIDOCARE) 4 % Patch Place 1 patch onto the skin every 24 hours Apply patch to abdomen once daily as needed. Remove after 12 hours. To prevent lidocaine toxicity, should be patch free for 12 hrs daily.Disp-15 patch, [id number]      methocarbamol (ROBAXIN) 500 MG tablet 1 tablet (500 mg) by Per Feeding Tube route 4 times daily as needed for muscle spasms, Disp-50 tablet, R-0, E-Prescribe      montelukast (SINGULAIR) 10 MG tablet 10 mg by Per G Tube route At Bedtime, Historical      omeprazole (PRILOSEC) 20 MG DR capsule 20 mg At Bedtime Through the J tube and clamp afterwards., Historical      prochlorperazine (COMPAZINE) 10 MG  "tablet TAKE 1/2 TABLET(5 MG) BY MOUTH EVERY 6 HOURS AS NEEDED FOR NAUSEA OR VOMITING, Disp-60 tablet, R-2, E-Prescribe      Prucalopride Succinate 2 MG TABS Take 1 tablet (2 mg) by mouth daily, Disp-30 tablet, R-11, E-Prescribe      scopolamine (TRANSDERM) 1 MG/3DAYS 72 hr patch Place 1 patch onto the skin every 72 hours, Disp-10 patch, R-11, E-Prescribe      sennosides (SENOKOT) 8.8 MG/5ML syrup 5 mLs by Per J Tube route 2 times daily, Disp-236 mL, R-0, E-Prescribe      ZOLMitriptan (ZOMIG-ZMT) 5 MG ODT Take 1 tablet (5 mg) by mouth at onset of headache for migraine May repeat in 2 hours. Max 2 tablets/24 hours., Disp-18 tablet, R-6, E-Prescribe      blood glucose (PAT CONTOUR NEXT) test strip Use to test blood sugar 6 times daily or as directed., Disp-2 Box, R-11, E-Prescribe      blood glucose monitoring (PAT MICROLET) lancets Use to test blood sugar 6-8 times daily or as directed., Disp-100 each, R-11, E-Prescribe      Continuous Blood Gluc Sensor (FREESTYLE LISA 2 SENSOR) MISC Inject 1 patch into the skin every 14 days, Disp-2 each, R-11, E-PrescribePlease call Lidya Gaffney [facility] with questions. [phone] Fax       glucose 40 % GEL gel Take 15-30 g by mouth every 15 minutes as needed for low blood sugarDisp-3 Tube, [id number]      Hydroactive Dressings (TEGADERM HYDROCOLLOID THIN) MISC Use under sensor site , change every 14 days, Disp-2 each, R-11, E-PrescribeCall Lidya Gaffney  [phone] with any questions      insulin syringe-needle U-100 (30G X 1/2\" 0.3 ML) 30G X 1/2\" 0.3 ML miscellaneous Use 3 syringes daily or as directed.Disp-50 each, [id number]      Nutritional Supplements (BOOST HIGH PROTEIN) LIQD After above baseline labs are drawn, give: 6 mL/kg to maximum of 360 mL; the beverage is to be consumed within 5 minutes., R-0, No Print OutIf on pancreatic enzymes, patient may take home enzymes with Boost beverage.   Patients may take long acting insu lata (Levemir and " Lantus).   Patient should NOT cover Boost with Novolog, Humalog, Apidra, or regular insulin.      order for DME Equipment being ordered:CefalyDisp-1 Device, R-0, Local Print      Sharps Container (BD SHARPS ) MISC 1 Container as needed, Disp-1 each, R-1, E-Prescribe      STATIN NOT PRESCRIBED (INTENTIONAL) Historical         STOP taking these medications       levothyroxine 20 mcg/mL (THYQUIDITY) 20 mcg/mL SOLN oral solution Comments:   Reason for Stopping:         promethazine (PHENERGAN) 6.25 MG/5ML syrup Comments:   Reason for Stopping:             Allergies   Allergies   Allergen Reactions     Apple Anaphylaxis     Corticosteroids Other (See Comments)     All oral, IV and injectable steroids are contraindicated (unless in life threatening situations) in [facility] [employer] transplant recipients. They can cause irreversible loss of islet cell function. Please contact patient's transplant care coordinator AID Gaffney RN at [phone]/pager [phone] and/or endocrinologist prior to administration.       Depakote [Divalproex Sodium] Other (See Comments)     Chest pain     Zithromax [Azithromycin Dihydrate] Anaphylaxis     Noted in 4/7/08 ER     Bromfenac      Other reaction(s): Headache     Codeine      Other reaction(s): Hallucinations     Darvocet [Propoxyphene N-Apap] Itching     Morphine Nausea and Vomiting and Rash     Nalbuphine Hcl Rash     RASH :nubaine     Zosyn [Piperacillin-Tazobactam In D5w] Rash     Possible allergy, did have a diffuse rash that seemed drug related but could have also been related to soap in the hospital.      Bactrim [Sulfamethoxazole W-Trimethoprim] Other (See Comments) and Nausea and Vomiting     Severely low liver function.     Hmg-Coa-R Inhibitors Other (See Comments)     Previous liver issues, risks vs benefits felt to not warrant statin.  Discussed Oct 2022 visit     Tape [Adhesive Tape] Blisters     Tramadol      Zofran [Ondansetron] Other (See Comments)     migraine      Flagyl [Metronidazole] Hives and Rash

## 2023-02-06 LAB
PATH REPORT.COMMENTS IMP SPEC: NORMAL
PATH REPORT.COMMENTS IMP SPEC: NORMAL
PATH REPORT.FINAL DX SPEC: NORMAL
PATH REPORT.GROSS SPEC: NORMAL
PATH REPORT.MICROSCOPIC SPEC OTHER STN: NORMAL
PATH REPORT.RELEVANT HX SPEC: NORMAL
PHOTO IMAGE: NORMAL

## 2023-02-06 NOTE — PLAN OF CARE
Physical Therapy Discharge Summary    Reason for therapy discharge:    Discharged to home.    Progress towards therapy goal(s). See goals on Care Plan in [facility] electronic health record for goal details.  Goals partially met.  Barriers to achieving goals:   discharge from facility.    Therapy recommendation(s):    Continued therapy is recommended.  Rationale/Recommendations:  Recommend home health PT for progressing functional mobility.

## 2023-02-08 ENCOUNTER — OFFICE VISIT (OUTPATIENT)
Dept: GASTROENTEROLOGY | Facility: CLINIC | Age: 58
End: 2023-02-08
Payer: COMMERCIAL

## 2023-02-08 VITALS
SYSTOLIC BLOOD PRESSURE: 107 MMHG | HEART RATE: 107 BPM | DIASTOLIC BLOOD PRESSURE: 71 MMHG | OXYGEN SATURATION: 98 % | WEIGHT: 137.2 LBS | BODY MASS INDEX: 21.53 KG/M2 | HEIGHT: 67 IN

## 2023-02-08 DIAGNOSIS — K59.00 CONSTIPATION, UNSPECIFIED CONSTIPATION TYPE: ICD-10-CM

## 2023-02-08 DIAGNOSIS — K21.9 GASTROESOPHAGEAL REFLUX DISEASE WITHOUT ESOPHAGITIS: ICD-10-CM

## 2023-02-08 DIAGNOSIS — Z93.4 SMALL BOWEL TUBE FEEDING (H): ICD-10-CM

## 2023-02-08 DIAGNOSIS — R10.13 EPIGASTRIC PAIN: ICD-10-CM

## 2023-02-08 DIAGNOSIS — K31.84 GASTROPARESIS: ICD-10-CM

## 2023-02-08 DIAGNOSIS — G89.18 ACUTE POST-OPERATIVE PAIN: Primary | ICD-10-CM

## 2023-02-08 DIAGNOSIS — Z94.83 STATUS POST PANCREAS TRANSPLANTATION (H): ICD-10-CM

## 2023-02-08 PROCEDURE — 99417 PROLNG OP E/M EACH 15 MIN: CPT | Performed by: INTERNAL MEDICINE

## 2023-02-08 PROCEDURE — 99215 OFFICE O/P EST HI 40 MIN: CPT | Mod: 24 | Performed by: INTERNAL MEDICINE

## 2023-02-08 RX ORDER — SCOLOPAMINE TRANSDERMAL SYSTEM 1 MG/1
1 PATCH, EXTENDED RELEASE TRANSDERMAL
Qty: 10 PATCH | Refills: 11 | OUTPATIENT
Start: 2023-02-08

## 2023-02-08 RX ORDER — OXYCODONE HCL 5 MG/5 ML
5-10 SOLUTION, ORAL ORAL EVERY 4 HOURS PRN
Qty: 473 ML | Refills: 0 | Status: SHIPPED | OUTPATIENT
Start: 2023-02-08 | End: 2023-02-22

## 2023-02-08 ASSESSMENT — PAIN SCALES - GENERAL: PAINLEVEL: SEVERE PAIN (7)

## 2023-02-08 NOTE — PROGRESS NOTES
GI CLINIC VISIT    CC: follow-up      ASSESSMENT/PLAN:  (G89.18) Acute post-operative pain  (primary encounter diagnosis)  (K31.84) Gastroparesis  (K59.00) Constipation, unspecified constipation type  (Z93.4) Small bowel tube feeding (H)  (K21.9) Gastroesophageal reflux disease without esophagitis        Severe gastroparesis, with marked nausea/vomiting, pain, inability to tolerate po intake. Unresponsive to medications (promotility agents, domperidone, antiemetic regimen, etc). Continues with G-tube for venting. Now s/p surgical J-tube with BAUDILIO and resection of a 15 cm loop of bowel which was encased in adhesions. On TFs which she is generally tolerating. Prior abd pain seems improved after BAUDILIO and release of tethered bowel, though having severe post-op pain (different in nature in patient's view). GERD has also worsened (concern for non-absorbing of capsule PPI; her insurance is declining liquid PPI). Bowel movements are in a good place, has been on senna for years - some likely colonic reliance at this point, but currently on a low dose. Constipation action plan was discussed as well (as outlined below).     - continue 2.5 mg senna per J-tube twice daily  - if BMs decrease to twice weekly or less - ok to take an additional dose of senna daily until satisfactory BM  - consider use of glycerin suppository or Fleets enema if increased rectal/LLQ symptoms and/or hard stools/difficulty evacuating  - continue famotidine as you are  - GI will look in IV PPI via port, until then continue oral PPI  - ok to continue scopolamine patch as you are  - will place palliative consult as per your request for further discussion of symptom management  - able to reach surgery clinic to discuss pain medication shortage - they plan to refill medication until their upcoming post-op check  - follow-up in luminal clinic in 12-16 weeks       It was a pleasure to participate in the care of this patient; please contact us with any further  questions.  A total of 45 face-to-face minutes was spent with this patient, >50% of which was counseling regarding the above delineated issues. An additional 45 minutes was spent on the date of the encounter doing chart review, documentation, care coordination, and further activities as noted above.    Vijaya Genao MD   of Medicine  Division of Gastroenterology, Hepatology and Nutrition  [facility]    ---------------------------------------------------------------------------------------------------  HPI:    Ms. 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 She was initially seen in general GI clinic by this writer on 11/1/2017 and has been/ seen by myself and ANA Gan since that time. Her pancreas txplt surgeon was Dr. Phoenix; she now follows with Dr. Honeycutt. She also follows with Dr. Park of GI in [facility]. Her last visit with me was 6/22/2022, though she has been seen in Pancreas Clinic in the interim.      History summarized and updated from previous GI documentation. Please see previous notes for further details      Gastroparesis  Initially diagnosed years ago with 2-hour study gastric emptying study at [facility]. Repeat testing here with 4-hour GES on 6/27/2016 with 49% remaining at 4 hours (?on pain meds at the time).  This was addressed with prior GJ tube in fall 2016, NG tube used for venting. Ultimately, after TPIAT, she was able to wean off of TFs and return to a regular diet.  Patient did have some persisting nausea and vomiting which she treated over the years with various  medications including scopolamine patch, prochlorperazine (most mornings), and promethazine (most evenings).  She has also been seen by neurology and psychology to assist with insomnia and with migraines (and any contribution they may have to nausea).       Patient reports that she had been feeling pretty well up until early November 2020.  At that time she was seen at the [facility] emergency department on November 18 for marked nausea and emesis.  At that time, blood work notable for normal BMP, low lipase, unremarkable hepatic panel.  CBC without elevation of WBCs though she did have elevated platelets, neutrophils and lymphocytes.  CT at that time with moderate stool burden, nonspecific fluid in small intestine.  No evidence of obstruction per documentation. Had similar episodes after this and dietary interventions were initiated as well as some work on her bowel regimen (no marked improvement after bowel cleanout). With some effort she was able to have some solid foods and focused on ingestion of 6-7 small meals a day. At some points she was on a liquid-only/juicing diet.  Initially these dietary changes seemed to help, but over time the response waned. Beyond dietary interventions for GP, she has tried metoclopramide with no response. Has not tried erythromycin or azithromycin due to anaphylactic event with prior azithromycin use (in ED in 2008).  Symptoms did not improve with bowel cleanout as mentioned above. Another course of rifaximin (previously successful at addressing bloating and stool issues) was not helpful for her pain, bloating/fullness, and n/v. Amitriptyline was increased to 60 mg nightly with no change in discomfort.    From winter 2020 to May/Saba she lost about 16 lbs (from 156-->140 lbs). She continued to experience more pronounced post-prandial pain and fullness and had vomiting of food after eating. Work-up for alternate causes (with SBFT, CTE, CTA) was normal.       Initially a GJ  was placed in Sept 2021, but this was replaced a few times due to J-tube coiling in stomach as well as G-tube clogging/malfunction. On 10/19/21 she had placement of a weighted GJ but continued to struggle with discomfort at insertion site, clogging/venting issues. Ultimately a direct jejunostomy was done and after struggling with this for awhile it was removed. She worked hard to return to an oral diet.      At her last visit in summer 2022, Ms. Loo had only a G-tube for venting.  At that time she was tolerating one small plate/1 cup/1 bowl of food without vomiting, 70% of the time. She would have 5-6 of these servings a day and estimates this is 8253-9304 Kcal. More recently she has reintroduced meat - chicken, fish occasionally. Ms. Loo also had regular protein supplement drinks and liquid IV brand drinks, gatorade, water. She was venting as needed via the G which she found useful. Overall her weight had stabilized around 140 lbs. For nausea, she continued on her chronic scopolamine patch and is still taking prochlorperazine and promethazine about every 6-8 hours.      After that visit, she had progressive decline in her ability to tolerate oral intake and had additional weight. She was initiated on domperidone by Dr. Mandeep Park, but did not have response even with up-titration of dosing. Small bowel feeds were discussed and her G (KISHORE-KEY) was replaced with a G-J a couple times, but had issues with J-tube extension coiling. Ultimately on 12/30/22, Dr. Bauer placed a direct J in the OR; extensive BAUDILIO and a resection of 15 cm of SB with dense adhesions was also done. TFs were started and continued in earnest from that point on.     Last week she had a G and J tube exchange. Unfortunately a perforation occurred and she went back to the OR that day. She had a jejunal perforation which was repaired with bowel resection and additional BAUDILIO was performed. She was hospitalized thereafter and TFs were  restarted.     Since starting her SB TFs she has had markedly reduction in emesis, though she still continues to have nausea and dry heaves. For nausea she continues to use: 1 scopolamine patch daily, IV promethazine once daily with J-tube promethazine at night. Is using compazine 10 mg - once daily.    Her TFs are running at 45 mL for 18 jrs a day; she thinks she is a little under her goal intake but has been working with dietitian on titration of the feeds. Only oral intake is just bites of food for pleasure. She is venting G-tube frequently - mostly seeing clear, colored liquid - no TFs. She is on Relizorb digestive enzyme cartridge.     Ms. Loo continues to do IVF via her port - running now about 2.5 hours each day.       Constipation/irregular bowel movements/bloating  Patient reports history of ongoing constipation for over 20 years after having her first child.  She has tried multiple interventions including regular bowel cleanouts, MiraLAX, senna, milk of magnesium, Linzess, and Amitiza for which she was on when she first came to  GI clinic.  Note, patient has been on Creon in the past (currently on Relizorb digestive enzyme cartridge).    Amitiza had worked for awhile then lost effectiveness. She was trialed on Trulance but reported frequent loose stools in the initial days (including nocturnal stooling and incontinence) which prompted her to stop. Trial of rifaximin in the past with with improvement in bloating and stool consistency though, as stated above, a re-trial for her pain, n/v, and fullness was not successful.    Motegrity was then started with some success, though noted HAs. We discussed a possible switch in her regimen to address this, though she was most comfortable with continuing Motegrity and monitoring her symptoms.  Previously discussed combining daily constipation medications; noted patient reports cost for Amitiza was quite a lot  (running $1000+/month).     At her last GI luminal  "visit, she was on Motegrity daily as well as Miralax 1 capful BID, 4 senna a day, 1000 mg Mag citrate daily, and stool softeners. With this she typically will have no BMs for 3-4 days (though has gone anywhere from 1-8 days), then have a day of emptying where she has multiple BMs over a day. These are often \"enormous\" and she does feel empty when complete. Noted her bowel pattern is complicated by her need for antiemetics. When she is \"backed up\" she will experience early satiety.       Today, she reports BMs have been good since starting TFs. She stopped Motegrity/prucalopride around 12/30/2022. She is taking only 2.5 mL of liquid senna BID (total 8.8 mg daily).  With this Ms. Loo, generally moves her bowels 3-4 days out of a week. On the days she moves her bowels it's typically more than once (2-3 times). Stool consistency is soft, \"ribbon-like\" stools. She denies straining; no issues evacuating. When asked about HAs she does report that are better - unclear if this is because she is off Motegrity or if due to treatment of migraines (now getting Botox injections.)       GERD, Dysphagia   Summarized/taken from prior documentation  Patient experiences GERD as substernal and throat burning with nocturnal relaxant causing choking. She had previously followed with Dr. George Flores in out clinic and reports a prior Schatzki's ring requiring previous dilation. Manometry in 2015 was normal, and pH impedence at that time had a DeMeester of 24 with positive symptoms association. Patient has treated GERD symptoms with BID PPI, Carafate, H2 blocker, and tums. At her last visit, she had been experiencing heartburn and has restarted omeprazole 40 mg daily with continued symptoms with specific food trigger.  Had also previously been on Zantac up to 300 mg a day.     Currently, she is taking famotidine liquid via her J-tube. Her insurance has not permitted coverage of a liquid PPI. She has had to take omeprazole capsule via " "mouth and then clamp her G-tube for time to allow absorption. Unfortunately, even with these efforts she is seeing capsule contents in the G-tube bag the moment the clamp is released. She is also unable to clamp for extended periods of time due to increased nausea and significant pain when doing so (if holding longer than 30-60 min or so).  She has had periods of breakthrough heartburn. Symptoms are very intense without PPI (on H2 blocker only).       Pain:   In regards to pain she has followed in pain clinic for management of bulging disk as well as chronic abdominal pain for which she previously had local injections. Ms. Loo shares that much of her abdominal pain is a lot better after BAUDILIO and her first SB resection.  She was off of all pain medications except Tylenol and methycarbomal (for back pain) until this most recent hospitalization for management of her perforation. She is having marked pain at her J-tube site and in that general area (described as \"excrutiating\"). It is worse with movement as well as with pushing in fluids (water or medications). She does not feel a higher rate of TFs could be tolerated at this time. G-tube site has some pain, but less so. She is hopeful this is all acute post-op pain which will improve with time. She was given liquid oxycodone 5mg/mL, is taking it every 4 hours. She was given enough for 20 doses and is almost out. Her follow-up visit in surgery clinic is 2/22/23.       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  pancreas and spleen removed, islet cells transplanted into liver    3 " classes of vaccines needed .    1. Pneumococcal vaccines are as follows:       PCV 13 - one time dose (was given 2017)       PPSV23 - (given 12/1/16); repeat q 5 years      ROLE of PCV 20???    2. Meningococcal vaccines     Menactra - (last given 12/1/16) -  repeat q 5 yrs   NOTE: need to wait 4 wks after PCV 13 has been given.   OR - give Menveo instead as it can be given same time as PCV 13    AND   Bexcero - (got 12/9/16) - does not need to be repeated.     3. The Hib vaccine - (got 12/9/16) - does not need to be repeated.       Poisoning by drug 05/09/2022     Priority: Medium     Formatting of this note might be different from the original.  Per Care Everywhere review:  All oral, IV and injectable steroids are contraindicated (unless in life threatening situations) in [facility] [employer] transplant recipients. They can cause irreversible loss of islet cell function. Please contact patient's transplant care coordinator ADI Gaffney RN at [phone]/pager [phone] and/or endocrinologist prior to administration.       Type 1 diabetes mellitus without complication (H) 05/09/2022     Priority: Medium     Formatting of this note might be different from the original.  ACTUALLY - DM 3c - pathogenic diabetes as no pancreas.  (pancreas and spleen removed, islet cells transplanted into liver)    Seeing ENDO at [facility] - Dr Erum Melissa       Intra-abdominal abscess (H) 12/03/2021     Priority: Medium     Postprocedural intraabdominal abscess 12/03/2021     Priority: Medium     Gastrostomy tube obstruction (H) 11/27/2021     Priority: Medium     Abdominal pain, generalized 09/18/2021     Priority: Medium     Adult failure to thrive 08/16/2021     Priority: Medium     Added automatically from request for surgery [id number]       Hypoglycemia 08/05/2021     Priority: Medium     Iron deficiency anemia 03/22/2019     Priority: Medium     Headache, chronic migraine without aura 09/18/2018     Priority: Medium     Chronic pain  syndrome 09/18/2018     Priority: Medium     S/P hernia repair 08/23/2018     Priority: Medium     Incisional hernia, without obstruction or gangrene 05/20/2018     Priority: Medium     Adhesive capsulitis of shoulder, unspecified laterality 11/14/2017     Priority: Medium     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  Excess IgG4  [facility]       Gastroparesis      Priority: Medium     ACP (advance care planning) 03/28/2017     Priority: Medium     Advance Care Planning 3/28/2017: Receipt of ACP document:  Received: Health Care Directive which was witnessed or notarized on 12/8/16.  Document previously scanned on 1/12/17.  Validation form completed and scanned.  Code Status reflects choices in most recent ACP document..  Confirmed/documented designated decision maker(s).  Added by May Baires   Advance Care Planning Liaison               Pancreatic insufficiency 01/17/2017     Priority: Medium     Diabetes insipidus (H) 01/05/2017     Priority: Medium     Formatting of this note might be different from the original.  Diabetes Insipidus (DI)  Per external records.  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  Depression NOS       Other type of intractable migraine      Priority: Medium     Diagnosis updated by automated process. Provider to review and confirm.       GERD (gastroesophageal reflux disease) 12/01/2010     Priority: Medium  "    Lumbago 04/18/2005     Priority: Medium     History of L5-S1 degenerative disk disease.         Sensorineural hearing loss 01/10/2005     Priority: Medium     Problem list name updated by automated process. Provider to review       Vertiginous syndrome and labyrinthine disorder 01/10/2005     Priority: Medium     Problem list name updated by automated process. Provider to review  IMO Regulatory Load OCT 2020       Sprain and strain of other specified sites of hip and thigh 12/09/2002     Priority: Medium     Chondromalacia of patella 12/09/2002     Priority: Medium     Need for prophylactic immunotherapy      Priority: Medium     trees, grass, srw, dust mites, cat       Allergic rhinitis due to other allergen 12/21/2001     Priority: Medium     Hypothyroidism      Priority: Medium     Problem list name updated by automated process. Provider to review         PERTINENT MEDICATIONS:  Current Outpatient Medications   Medication     acarbose (PRECOSE) 100 MG tablet     acetaminophen (TYLENOL) 325 MG/10.15ML solution     amylase-lipase-protease (CREON) 52676-07308 units CPEP per EC capsule     blood glucose (PAT CONTOUR NEXT) test strip     blood glucose monitoring (PAT MICROLET) lancets     Continuous Blood Gluc Sensor (FREESTYLE LISA 2 SENSOR) MISC     cyclobenzaprine (FLEXERIL) 10 MG tablet     diphenhydrAMINE (BENADRYL ALLERGY) 25 MG capsule     estradiol (VAGIFEM) 10 MCG TABS vaginal tablet     famotidine (PEPCID) 40 MG/5ML suspension     glucagon 1 MG kit     glucose 40 % GEL gel     Hydroactive Dressings (TEGADERM HYDROCOLLOID THIN) MISC     ibuprofen (ADVIL/MOTRIN) 400 MG tablet     insulin syringe-needle U-100 (30G X 1/2\" 0.3 ML) 30G X 1/2\" 0.3 ML miscellaneous     lactated ringers infusion     levothyroxine (SYNTHROID/LEVOTHROID) 137 MCG tablet     Lidocaine (LIDOCARE) 4 % Patch     methocarbamol (ROBAXIN) 500 MG tablet     montelukast (SINGULAIR) 10 MG tablet     Nutritional Supplements (BOOST HIGH " PROTEIN) LIQD     omeprazole (PRILOSEC) 20 MG DR capsule     order for DME     oxyCODONE (ROXICODONE) 5 MG/5ML solution     prochlorperazine (COMPAZINE) 10 MG tablet     promethazine 25 mg     Prucalopride Succinate 2 MG TABS     scopolamine (TRANSDERM) 1 MG/3DAYS 72 hr patch     sennosides (SENOKOT) 8.8 MG/5ML syrup     Sharps Container (BD SHARPS ) MISC     STATIN NOT PRESCRIBED (INTENTIONAL)     zinc oxide (DESITIN) 20 % external ointment     zinc oxide - white petrolatum (CRITIC-AID THICK MOIST BARRIER) 20-51% PSTE topical paste     ZOLMitriptan (ZOMIG-ZMT) 5 MG ODT     Current Facility-Administered Medications   Medication     Botulinum Toxin Type A (BOTOX) 200 units injection 155 Units         PHYSICAL EXAMINATION:  Vitals There were no vitals taken for this visit.   Wt   Wt Readings from Last 2 Encounters:   02/04/23 60.1 kg (132 lb 9.6 oz)   01/26/23 63.4 kg (139 lb 12.8 oz)      Gen: Pt sitting up on exam table in NAD, interactive and cooperative on exam  Eyes: sclerae anicteric, no injection  Resp: Breathing comfortably, speaking in full sentences   Abd incision: staples in place, clean and dry, no drainage no erythema - appears to be healing well  Skin: Warm, dry, no jaundice, nails appear healthy  Neuro: alert, oriented, answers questions appropriately

## 2023-02-08 NOTE — NURSING NOTE
"Chief Complaint   Patient presents with     Follow Up       Vitals:    02/08/23 0757   BP: 107/71   BP Location: Left arm   Patient Position: Sitting   Cuff Size: Adult Regular   Pulse: 107   SpO2: 98%   Weight: 62.2 kg (137 lb 3.2 oz)   Height: 1.702 m (5' 7\")       Body mass index is 21.49 kg/m .    [employer]    "

## 2023-02-08 NOTE — PATIENT INSTRUCTIONS
- continue 2.5 mg senna per J-tube twice daily  - if BMs decrease to twice weekly or less - ok to take an additional dose of senna daily until satisfactory BM  - consider use of glycerin suppository or Fleets enema if increased rectal/LLQ symptoms and/or hard stools/difficulty evacuating  - continue famotidine as you are  - GI will look in IV PPI via port, until then continue oral PPI  - ok to continue scopolamine patch as you are  - will place palliative consult as per your request for further discussion of symptom management  - able to reach surgery clinic to discuss pain medication shortage - they plan to refill medication until their upcoming post-op check  - follow-up in luminal clinic in 12-16 weeks      If you have any questions, please don't hesitate to contact me through our GI RN Clinic Coordinator, Ashley Mane, at [phone].

## 2023-02-08 NOTE — LETTER
2/8/2023         RE: Dinora Mcghee  [address] [facility] [address]        Dear Colleague,    Thank you for referring your patient, Dinora Mcghee, to the [facility] [place]. Please see a copy of my visit note below.    GI CLINIC VISIT    CC: follow-up    ASSESSMENT/PLAN:  (G89.18) Acute post-operative pain  (primary encounter diagnosis)  (K31.84) Gastroparesis  (K59.00) Constipation, unspecified constipation type  (Z93.4) Small bowel tube feeding (H)  (K21.9) Gastroesophageal reflux disease without esophagitis     Severe gastroparesis, with marked nausea/vomiting, pain, inability to tolerate po intake. Unresponsive to medications (promotility agents, domperidone, antiemetic regimen, etc). Continues with G-tube for venting. Now s/p surgical J-tube with BAUDILIO and resection of a 15 cm loop of bowel which was encased in adhesions. On TFs which she is generally tolerating. Prior abd pain seems improved after BAUDILIO and release of tethered bowel, though having severe post-op pain (different in nature in patient's view). GERD has also worsened (concern for non-absorbing of capsule PPI; her insurance is declining liquid PPI). Bowel movements are in a good place, has been on senna for years - some likely colonic reliance at this point, but currently on a low dose. Constipation action plan was discussed as well (as outlined below).     - continue 2.5 mg senna per J-tube twice daily  - if BMs decrease to twice weekly or less - ok to take an additional dose of senna daily until satisfactory BM  - consider use of glycerin suppository or Fleets enema if increased rectal/LLQ symptoms and/or hard stools/difficulty evacuating  - continue famotidine as you are  - GI will look in IV PPI via port, until then continue oral PPI  - ok to continue scopolamine patch as you are  - will place palliative consult as per your request for further discussion of symptom management  - able to reach surgery  clinic to discuss pain medication shortage - they plan to refill medication until their upcoming post-op check  - follow-up in [facility] clinic in 12-16 weeks       It was a pleasure to participate in the care of this patient; please contact us with any further questions.  A total of 45 face-to-face minutes was spent with this patient, >50% of which was counseling regarding the above delineated issues. An additional 45 minutes was spent on the date of the encounter doing chart review, documentation, care coordination, and further activities as noted above.    Vijaya Genao MD   of Medicine  Division of Gastroenterology, Hepatology and Nutrition  [facility]    ---------------------------------------------------------------------------------------------------  HPI:    Ms. 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 She was initially seen in general GI clinic by this writer on 11/1/2017 and has been/ seen by myself and ANA Gan since that time. Her pancreas txplt surgeon was Dr. Phoenix; she now follows with Dr. Honeycutt. She also follows with Dr. Park of GI in Pancreas-Biliary Clinic. Her last visit with me was 6/22/2022, though she has been seen in Pancreas Clinic in the interim.      History summarized and updated from previous GI documentation. Please see previous notes for further details      Gastroparesis  Initially diagnosed years ago with 2-hour study gastric emptying study at [facility]. Repeat testing here with 4-hour GES on 6/27/2016 with 49% remaining at 4 hours (?on pain meds at the time).  This was  addressed with prior GJ tube in fall 2016, NG tube used for venting. Ultimately, after TPIAT, she was able to wean off of TFs and return to a regular diet.  Patient did have some persisting nausea and vomiting which she treated over the years with various medications including scopolamine patch, prochlorperazine (most mornings), and promethazine (most evenings).  She has also been seen by neurology and psychology to assist with insomnia and with migraines (and any contribution they may have to nausea).       Patient reports that she had been feeling pretty well up until early November 2020.  At that time she was seen at the [facility] emergency department on November 18 for marked nausea and emesis.  At that time, blood work notable for normal BMP, low lipase, unremarkable hepatic panel.  CBC without elevation of WBCs though she did have elevated platelets, neutrophils and lymphocytes.  CT at that time with moderate stool burden, nonspecific fluid in small intestine.  No evidence of obstruction per documentation. Had similar episodes after this and dietary interventions were initiated as well as some work on her bowel regimen (no marked improvement after bowel cleanout). With some effort she was able to have some solid foods and focused on ingestion of 6-7 small meals a day. At some points she was on a liquid-only/juicing diet.  Initially these dietary changes seemed to help, but over time the response waned. Beyond dietary interventions for GP, she has tried metoclopramide with no response. Has not tried erythromycin or azithromycin due to anaphylactic event with prior azithromycin use (in ED in 2008).  Symptoms did not improve with bowel cleanout as mentioned above. Another course of rifaximin (previously successful at addressing bloating and stool issues) was not helpful for her pain, bloating/fullness, and n/v. Amitriptyline was increased to 60 mg nightly with no change in discomfort.    From winter  2020 to May/[place] she lost about 16 lbs (from 156-->140 lbs). She continued to experience more pronounced post-prandial pain and fullness and had vomiting of food after eating. Work-up for alternate causes (with SBFT, CTE, CTA) was normal.       Initially a GJ was placed in Sept 2021, but this was replaced a few times due to J-tube coiling in stomach as well as G-tube clogging/malfunction. On 10/19/21 she had placement of a weighted GJ but continued to struggle with discomfort at insertion site, clogging/venting issues. Ultimately a direct jejunostomy was done and after struggling with this for awhile it was removed. She worked hard to return to an oral diet.      At her last visit in summer 2022, Ms. Loo had only a G-tube for venting.  At that time she was tolerating one small plate/1 cup/1 bowl of food without vomiting, 70% of the time. She would have 5-6 of these servings a day and estimates this is 6651-3118 Kcal. More recently she has reintroduced meat - chicken, fish occasionally. Ms. Loo also had regular protein supplement drinks and liquid IV brand drinks, gatorade, water. She was venting as needed via the G which she found useful. Overall her weight had stabilized around 140 lbs. For nausea, she continued on her chronic scopolamine patch and is still taking prochlorperazine and promethazine about every 6-8 hours.      After that visit, she had progressive decline in her ability to tolerate oral intake and had additional weight. She was initiated on domperidone by Dr. Mandeep Park, but did not have response even with up-titration of dosing. Small bowel feeds were discussed and her G (KISHORE-KEY) was replaced with a G-J a couple times, but had issues with J-tube extension coiling. Ultimately on 12/30/22, Dr. Bauer placed a direct J in the OR; extensive BAUDILIO and a resection of 15 cm of SB with dense adhesions was also done. TFs were started and continued in earnest from that point on.     Last week she  had a G and J tube exchange. Unfortunately a perforation occurred and she went back to the OR that day. She had a jejunal perforation which was repaired with bowel resection and additional BAUDILIO was performed. She was hospitalized thereafter and TFs were restarted.     Since starting her SB TFs she has had markedly reduction in emesis, though she still continues to have nausea and dry heaves. For nausea she continues to use: 1 scopolamine patch daily, IV promethazine once daily with J-tube promethazine at night. Is using compazine 10 mg - once daily.    Her TFs are running at 45 mL for 18 jrs a day; she thinks she is a little under her goal intake but has been working with dietitian on titration of the feeds. Only oral intake is just bites of food for pleasure. She is venting G-tube frequently - mostly seeing clear, colored liquid - no TFs. She is on Relizorb digestive enzyme cartridge.     Ms. Loo continues to do IVF via her port - running now about 2.5 hours each day.       Constipation/irregular bowel movements/bloating  Patient reports history of ongoing constipation for over 20 years after having her first child.  She has tried multiple interventions including regular bowel cleanouts, MiraLAX, senna, milk of magnesium, Linzess, and Amitiza for which she was on when she first came [facility].  Note, patient has been on Creon in the past (currently on Relizorb digestive enzyme cartridge).    Amitiza had worked for awhile then lost effectiveness. She was trialed on Trulance but reported frequent loose stools in the initial days (including nocturnal stooling and incontinence) which prompted her to stop. Trial of rifaximin in the past with with improvement in bloating and stool consistency though, as stated above, a re-trial for her pain, n/v, and fullness was not successful.    Motegrity was then started with some success, though noted HAs. We discussed a possible switch in her regimen to address this, though  "she was most comfortable with continuing Motegrity and monitoring her symptoms.  Previously discussed combining daily constipation medications; noted patient reports cost for Amitiza was quite a lot  (running $1000+/month).     At her last GI luminal visit, she was on Motegrity daily as well as Miralax 1 capful BID, 4 senna a day, 1000 mg Mag citrate daily, and stool softeners. With this she typically will have no BMs for 3-4 days (though has gone anywhere from 1-8 days), then have a day of emptying where she has multiple BMs over a day. These are often \"enormous\" and she does feel empty when complete. Noted her bowel pattern is complicated by her need for antiemetics. When she is \"backed up\" she will experience early satiety.       Today, she reports BMs have been good since starting TFs. She stopped Motegrity/prucalopride around 12/30/2022. She is taking only 2.5 mL of liquid senna BID (total 8.8 mg daily).  With this Ms. Loo, generally moves her bowels 3-4 days out of a week. On the days she moves her bowels it's typically more than once (2-3 times). Stool consistency is soft, \"ribbon-like\" stools. She denies straining; no issues evacuating. When asked about HAs she does report that are better - unclear if this is because she is off Motegrity or if due to treatment of migraines (now getting Botox injections.)       GERD, Dysphagia   Summarized/taken from prior documentation  Patient experiences GERD as substernal and throat burning with nocturnal relaxant causing choking. She had previously followed with Dr. George Flores in out clinic and reports a prior Schatzki's ring requiring previous dilation. Manometry in 2015 was normal, and pH impedence at that time had a DeMeester of 24 with positive symptoms association. Patient has treated GERD symptoms with BID PPI, Carafate, H2 blocker, and tums. At her last visit, she had been experiencing heartburn and has restarted omeprazole 40 mg daily with continued " "symptoms with specific food trigger.  Had also previously been on Zantac up to 300 mg a day.     Currently, she is taking famotidine liquid via her J-tube. Her insurance has not permitted coverage of a liquid PPI. She has had to take omeprazole capsule via mouth and then clamp her G-tube for time to allow absorption. Unfortunately, even with these efforts she is seeing capsule contents in the G-tube bag the moment the clamp is released. She is also unable to clamp for extended periods of time due to increased nausea and significant pain when doing so (if holding longer than 30-60 min or so).  She has had periods of breakthrough heartburn. Symptoms are very intense without PPI (on H2 blocker only).       Pain:   In regards to pain she has followed in pain clinic for management of bulging disk as well as chronic abdominal pain for which she previously had local injections. Ms. Loo shares that much of her abdominal pain is a lot better after BAUDILIO and her first SB resection.  She was off of all pain medications except Tylenol and methycarbomal (for back pain) until this most recent hospitalization for management of her perforation. She is having marked pain at her J-tube site and in that general area (described as \"excrutiating\"). It is worse with movement as well as with pushing in fluids (water or medications). She does not feel a higher rate of TFs could be tolerated at this time. G-tube site has some pain, but less so. She is hopeful this is all acute post-op pain which will improve with time. She was given liquid oxycodone 5mg/mL, is taking it every 4 hours. She was given enough for 20 doses and is almost out. Her follow-up visit in surgery clinic is 2/22/23.       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  pancreas and spleen removed, islet cells transplanted into liver    3 classes of vaccines needed .    1. Pneumococcal vaccines are as follows:       PCV 13 - one time dose (was given 2017)       PPSV23 - (given 12/1/16); repeat q 5 years      ROLE of PCV 20???    2. Meningococcal vaccines     Menactra - (last given 12/1/16) -  repeat q 5 yrs   NOTE: need to wait 4 wks after PCV 13 has been given.   OR - give Menveo instead as it can be given same time as PCV 13    AND   Bexcero - (got 12/9/16) - does not need to be repeated.     3. The Hib vaccine - (got 12/9/16) - does not need to be repeated.       Poisoning by drug 05/09/2022     Priority: Medium     Formatting of this note might be different from the original.  Per Care Everywhere review:  All oral, IV and injectable steroids are contraindicated (unless in life threatening situations) in [facility] [employer] transplant recipients. They can cause irreversible loss of islet cell function. Please contact patient's transplant care coordinator ADI Gaffney RN at [phone]/pager [phone] and/or endocrinologist prior to administration.       Type 1 diabetes mellitus without complication (H) 05/09/2022     Priority: Medium     Formatting of this note might be different from the original.  ACTUALLY - DM 3c - pathogenic diabetes as no pancreas.  (pancreas and spleen removed, islet cells transplanted into liver)    Seeing ENDO at [facility] - Dr Eurm Melissa       Intra-abdominal abscess (H) 12/03/2021     Priority: Medium     Postprocedural intraabdominal abscess 12/03/2021     Priority: Medium     Gastrostomy tube obstruction (H) 11/27/2021     Priority: Medium     Abdominal pain, generalized 09/18/2021     Priority: Medium     Adult failure to thrive 08/16/2021     Priority: Medium     Added  automatically from request for surgery [id number]       Hypoglycemia 08/05/2021     Priority: Medium     Iron deficiency anemia 03/22/2019     Priority: Medium     Headache, chronic migraine without aura 09/18/2018     Priority: Medium     Chronic pain syndrome 09/18/2018     Priority: Medium     S/P hernia repair 08/23/2018     Priority: Medium     Incisional hernia, without obstruction or gangrene 05/20/2018     Priority: Medium     Adhesive capsulitis of shoulder, unspecified laterality 11/14/2017     Priority: Medium     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  Excess IgG4  [facility]       Gastroparesis      Priority: Medium     ACP (advance care planning) 03/28/2017     Priority: Medium     Advance Care Planning 3/28/2017: Receipt of ACP document:  Received: Health Care Directive which was witnessed or notarized on 12/8/16.  Document previously scanned on 1/12/17.  Validation form completed and scanned.  Code Status reflects choices in most recent ACP document..  Confirmed/documented designated decision maker(s).  Added by May Baires   Advance Care Planning Liaison               Pancreatic insufficiency 01/17/2017     Priority: Medium     Diabetes insipidus (H) 01/05/2017     Priority: Medium     Formatting of this note might be different from the original.  Diabetes Insipidus (DI)  Per external records.  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  Depression NOS       Other type of intractable migraine      Priority: Medium     Diagnosis updated by automated process. Provider to review and confirm.       GERD (gastroesophageal reflux disease) 12/01/2010     Priority: Medium     Lumbago 04/18/2005     Priority: Medium     History of L5-S1 degenerative disk disease.         Sensorineural hearing loss 01/10/2005     Priority: Medium     Problem list name updated by automated process. Provider to review       Vertiginous syndrome and labyrinthine disorder 01/10/2005     Priority: Medium     Problem list name updated by automated process. Provider to review  IMO Regulatory Load OCT 2020       Sprain and strain of other specified sites of hip and thigh 12/09/2002     Priority: Medium     Chondromalacia of patella 12/09/2002     Priority: Medium     Need for prophylactic immunotherapy      Priority: Medium     trees, grass, srw, dust mites, cat       Allergic rhinitis due to other allergen 12/21/2001     Priority: Medium     Hypothyroidism      Priority: Medium     Problem list name updated by automated process. Provider to review         PERTINENT MEDICATIONS:  Current Outpatient Medications   Medication     acarbose (PRECOSE) 100 MG tablet     acetaminophen (TYLENOL) 325 MG/10.15ML solution     amylase-lipase-protease (CREON) 49244-24797 units CPEP per EC capsule     blood glucose (PAT CONTOUR NEXT) test strip     blood glucose monitoring (PAT MICROLET) lancets     Continuous Blood Gluc Sensor (FREESTYLE LISA 2 SENSOR) MISC     cyclobenzaprine (FLEXERIL) 10 MG tablet     diphenhydrAMINE (BENADRYL ALLERGY) 25 MG capsule     estradiol (VAGIFEM) 10 MCG TABS vaginal tablet     famotidine (PEPCID) 40 MG/5ML suspension     glucagon 1 MG kit     glucose 40 % GEL gel     Hydroactive Dressings (TEGADERM HYDROCOLLOID THIN) MISC     ibuprofen (ADVIL/MOTRIN) 400 MG tablet     insulin syringe-needle U-100 (30G X 1/2\" 0.3 ML) 30G X 1/2\" 0.3 ML " miscellaneous     lactated ringers infusion     levothyroxine (SYNTHROID/LEVOTHROID) 137 MCG tablet     Lidocaine (LIDOCARE) 4 % Patch     methocarbamol (ROBAXIN) 500 MG tablet     montelukast (SINGULAIR) 10 MG tablet     Nutritional Supplements (BOOST HIGH PROTEIN) LIQD     omeprazole (PRILOSEC) 20 MG DR capsule     order for DME     oxyCODONE (ROXICODONE) 5 MG/5ML solution     prochlorperazine (COMPAZINE) 10 MG tablet     promethazine 25 mg     Prucalopride Succinate 2 MG TABS     scopolamine (TRANSDERM) 1 MG/3DAYS 72 hr patch     sennosides (SENOKOT) 8.8 MG/5ML syrup     Sharps Container (BD SHARPS ) MISC     STATIN NOT PRESCRIBED (INTENTIONAL)     zinc oxide (DESITIN) 20 % external ointment     zinc oxide - white petrolatum (CRITIC-AID THICK MOIST BARRIER) 20-51% PSTE topical paste     ZOLMitriptan (ZOMIG-ZMT) 5 MG ODT     Current Facility-Administered Medications   Medication     Botulinum Toxin Type A (BOTOX) 200 units injection 155 Units         PHYSICAL EXAMINATION:  Vitals There were no vitals taken for this visit.   Wt   Wt Readings from Last 2 Encounters:   02/04/23 60.1 kg (132 lb 9.6 oz)   01/26/23 63.4 kg (139 lb 12.8 oz)      Gen: Pt sitting up on exam table in NAD, interactive and cooperative on exam  Eyes: sclerae anicteric, no injection  Resp: Breathing comfortably, speaking in full sentences   Abd incision: staples in place, clean and dry, no drainage no erythema - appears to be healing well  Skin: Warm, dry, no jaundice, nails appear healthy  Neuro: alert, oriented, answers questions appropriately      Sincerely,    Vijaya Genao MD

## 2023-02-10 ENCOUNTER — PATIENT OUTREACH (OUTPATIENT)
Dept: GASTROENTEROLOGY | Facility: CLINIC | Age: 58
End: 2023-02-10
Payer: COMMERCIAL

## 2023-02-10 NOTE — PROGRESS NOTES
Received a message regarding this patient to start on a IV PPI.   [facility]etr  does the port access and dressing changes  [facility] does the IV meds    Spoke with [facility] pharmacist and she  recommended pantoprazole 40 mg every day.   Spoke with Dr. Genao regarding the recommendations from the pharmacist and she is ok with the 40 mg every day   Spoke with Pharmcist at [facility] and gave the verbal order for the pantoprazole 40 mg every day.     Left a voicemail for Dinora with the above information     [facility] will call Dinora to set up time for the teaching

## 2023-02-14 ENCOUNTER — VIRTUAL VISIT (OUTPATIENT)
Dept: PALLIATIVE CARE | Facility: CLINIC | Age: 58
End: 2023-02-14
Attending: INTERNAL MEDICINE
Payer: COMMERCIAL

## 2023-02-14 DIAGNOSIS — R45.86 MOOD AND AFFECT DISTURBANCE: ICD-10-CM

## 2023-02-14 DIAGNOSIS — Z93.4 JEJUNOSTOMY PRESENT (H): ICD-10-CM

## 2023-02-14 DIAGNOSIS — Z94.83 STATUS POST PANCREAS TRANSPLANTATION (H): ICD-10-CM

## 2023-02-14 DIAGNOSIS — K31.84 GASTROPARESIS: Primary | ICD-10-CM

## 2023-02-14 PROCEDURE — 99205 OFFICE O/P NEW HI 60 MIN: CPT | Mod: 24 | Performed by: INTERNAL MEDICINE

## 2023-02-14 PROCEDURE — 99417 PROLNG OP E/M EACH 15 MIN: CPT | Mod: VID | Performed by: INTERNAL MEDICINE

## 2023-02-14 NOTE — LETTER
2/14/2023       RE: Dinora Mcghee  [address] [facility] [address]     Dear Colleague,    Thank you for referring your patient, Dinora Mcghee, [facility]. Please see a copy of my visit note below.      Palliative Care Outpatient Clinic      Patient ID:    Medical - She has chronic pancreatitis s/p TPAIT 2016; long term complicated by gastroparesis, constipation, GERD, migraine HAs.  Gastroparesis started ~late 2020, n/v, weight loss. Extensive w/u and tx including multiple drugs. GJ placed Sept 2021, complications, ended up with a venting G tube + oral intake but in 2022 PO tolerance worsened.  12/2022 she had a surgical placement of a J tube + BAUDILIO + resection of densely adhered small bowel-->tube feeding started. Needed another surgery, SB resection, and several interventions afterwards due to J tube issues.  She has separate GT and JTs. Gets IVF at home. Has a port.    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    Care Planning - +HCD on file      History:  History gathered today from: patient, medical chart    More recent GI hx, eg of gastroparesis, is summarized extensively in Dr Genao's note from 2/8/23 and key details confirmed with the patient. Recent JT placement and BAUDILIO & postop events reviewed and summarized above.     She has a  through [facility] who recommended she see us; what she describes is looking for more care coordination and connecting her with services. She worked as part of a palliative care program in [facility] in the early 2000s.  She observes her PCP who is in [place] is quite helpful but has made it clear to Ms Mcghee she's not able to provide much oversight/coordination for her complex conditions.      She reflects she has 3 good years after TPAIT, then 11/2022 GP started and life has  been very difficult since.  She reflects she feels really 'heavy' with all that's happening in her health, not hopeless.  She feels she is managing her care ok but that her body 'won't comply'--'rejecting the tubes' so to speak.  It's a full time job just to keep alive, eg.  Her disabilities are relatively hidden from many people in her life.  Eg she can eat a small bowl of soup sometimes but remains otherwise totally dependent on her tube feeds and people in her life assume she's just better.    She saw Rachel Sloan in the past; didn't click well. Saw Maria Elena Abdalla back in the day which was helpful. We talked about getting a new psychotherapist and she's contemplative of that.  Remote hx of depression in 1990s; ended up being her thyroid she says. On sertraline remotely, briefly.  On amitryptiline for HAs at one point: up to 80 mg/day. Globally feels happy; loves her life and family; mostly describes grief and loss about her health but not low mood, guilt, helplessness, hopelessness, depression, etc.    Her energy is low and she wonders if she can continue working long term.    But importantly at the moment she feels she is recovering from the surgeries and gaining strength back.  She's lost a lot of exercise capacity since all the surgeries in Dec; but feels like she is improving and will be able to be more active. Pain improving now slowly but steadily after her procedures.   She is reading and knitting regularly.    PE: There were no vitals taken for this visit.   Wt Readings from Last 3 Encounters:   02/08/23 62.2 kg (137 lb 3.2 oz)   02/04/23 60.1 kg (132 lb 9.6 oz)   01/26/23 63.4 kg (139 lb 12.8 oz)     Alert NAD  Clear sensorium full affect    Data reviewed:  I reviewed recent labs and imaging, my comments:  CT 1/12/23--GT, JT, images personally reviewed  Cr 0.46     database reviewed: y      Impression & Recommendations:  56 yo with a hx of chronic pancreatitis s/p TPAIT; late complications of  intractable gastroparesis, n/v, po intolerance, s/p multiple G and J tube placements with various complications including needing a couple operations the last couple months; fatigue and exercise intolerance from that; grief and mood disturbance but no adin depression or anxiety disorder.    We discussed a broad range of supportive care matters today as above.  Overall I don't think I or our clinic has a lot of specifics we can offer her. She's receiving careful and attentive care from her various GI and surgery physicians here at the  and I don't see we have anything additional that will improve her qol from a gastroparesis standpoint. She presents as very carefully and thoughtfully addressing her health and activity level and emotional well being.     Rec psychotherapy; we'll try one of our palliative LICSW    D/w her her very understandable desire for someone to oversee her care, help with care coordination, etc and I let her know our clinic is not a medial home for patients with complex illnesses, although I appreciate people are often told that we are. I recommended she seek primary care in the PCC practice at the [facility] however as that's a practice which strives I think to provide what she is looking for--eg primary care for patients with complex medical illnesses and many specialists.     Happy to see her back at anytime.     Over 90 minutes spent on the date of the encounter doing chart review, history and exam, patient education & counseling, documentation and other activities as noted above.    Thank you for involving us in the patient's care.   Elijah Baugh MD / Palliative Medicine / Text me via [facility].

## 2023-02-14 NOTE — PROGRESS NOTES
Video-Visit Details    Type of service:  Video Visit    Video Start Time (time video started): 12:59 PM    Video End Time (time video stopped): 145p    Originating Location (pt. Location): Home        Distant Location (provider location):  Off-site    Mode of Communication:  Video Conference via [facility]      Palliative Care Outpatient Clinic      Patient ID:    Medical - She has chronic pancreatitis s/p TPAIT 2016; long term complicated by gastroparesis, constipation, GERD, migraine HAs.  Gastroparesis started ~late 2020, n/v, weight loss. Extensive w/u and tx including multiple drugs. GJ placed Sept 2021, complications, ended up with a venting G tube + oral intake but in 2022 PO tolerance worsened.  12/2022 she had a surgical placement of a J tube + BAUDILIO + resection of densely adhered small bowel-->tube feeding started. Needed another surgery, SB resection, and several interventions afterwards due to J tube issues.  She has separate GT and JTs. Gets IVF at home. Has a port.    She's been followed in the 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    Care Planning - +HCD on file      History:  History gathered today from: patient, medical chart    More recent GI hx, eg of gastroparesis, is summarized extensively in Dr Genao's note from 2/8/23 and key details confirmed with the patient. Recent JT placement and BAUDILIO & postop events reviewed and summarized above.     She has a  through [facility] who recommended she see us; what she describes is looking for more care coordination and connecting her with services. She worked as part of a palliative care program in [facility] in the early 2000s.  She observes her PCP who is in [place] is quite helpful but has made it clear to Ms Mcghee she's not able to provide much oversight/coordination for her complex conditions.      She reflects she has 3 good years after TPAIT, then 11/2022 GP started and life  has been very difficult since.  She reflects she feels really 'heavy' with all that's happening in her health, not hopeless.  She feels she is managing her care ok but that her body 'won't comply'--'rejecting the tubes' so to speak.  It's a full time job just to keep alive, eg.  Her disabilities are relatively hidden from many people in her life.  Eg she can eat a small bowl of soup sometimes but remains otherwise totally dependent on her tube feeds and people in her life assume she's just better.    She saw Rachel Sloan in the past; didn't click well. Saw Maria Elena Abdalla back in the day which was helpful. We talked about getting a new psychotherapist and she's contemplative of that.  Remote hx of depression in 1990s; ended up being her thyroid she says. On sertraline remotely, briefly.  On amitryptiline for HAs at one point: up to 80 mg/day. Globally feels happy; loves her life and family; mostly describes grief and loss about her health but not low mood, guilt, helplessness, hopelessness, depression, etc.    Her energy is low and she wonders if she can continue working long term.    But importantly at the moment she feels she is recovering from the surgeries and gaining strength back.  She's lost a lot of exercise capacity since all the surgeries in Dec; but feels like she is improving and will be able to be more active. Pain improving now slowly but steadily after her procedures.   She is reading and knitting regularly.    PE: There were no vitals taken for this visit.   Wt Readings from Last 3 Encounters:   02/08/23 62.2 kg (137 lb 3.2 oz)   02/04/23 60.1 kg (132 lb 9.6 oz)   01/26/23 63.4 kg (139 lb 12.8 oz)     Alert NAD  Clear sensorium full affect    Data reviewed:  I reviewed recent labs and imaging, my comments:  CT 1/12/23--GT, JT, images personally reviewed  Cr 0.46     database reviewed: y      Impression & Recommendations:  58 yo with a hx of chronic pancreatitis s/p TPAIT; late complications of  intractable gastroparesis, n/v, po intolerance, s/p multiple G and J tube placements with various complications including needing a couple operations the last couple months; fatigue and exercise intolerance from that; grief and mood disturbance but no adin depression or anxiety disorder.    We discussed a broad range of supportive care matters today as above.  Overall I don't think I or our clinic has a lot of specifics we can offer her. She's receiving careful and attentive care from her various GI and surgery physicians here at the  and I don't see we have anything additional that will improve her qol from a gastroparesis standpoint. She presents as very carefully and thoughtfully addressing her health and activity level and emotional well being.     Rec psychotherapy; we'll try one of our palliative LICSW    D/w her her very understandable desire for someone to oversee her care, help with care coordination, etc and I let her know our clinic is not a medial home for patients with complex illnesses, although I appreciate people are often told that we are. I recommended she seek primary care in the PCC practice at the [facility] however as that's a practice which strives I think to provide what she is looking for--eg primary care for patients with complex medical illnesses and many specialists.     Happy to see her back at anytime.     Over 90 minutes spent on the date of the encounter doing chart review, history and exam, patient education & counseling, documentation and other activities as noted above.    Thank you for involving us in the patient's care.   Elijah Baugh MD / Palliative Medicine / Text me via [facility].

## 2023-02-14 NOTE — NURSING NOTE
Is the patient currently in the state of [place]? YES    Visit mode:VIDEO    If the visit is dropped, the patient can be reconnected by: VIDEO VISIT: Send to e-mail at: [email or web address]    Will anyone else be joining the visit? NO      How would you like to obtain your AVS? MyChart    Are changes needed to the allergy or medication list? NO    Comments or concerns regarding today's visit: no

## 2023-02-14 NOTE — PATIENT INSTRUCTIONS
Thank you for meeting with us in the [facility].    How to get a hold of us:  For non-urgent matters, MyChart works best.    For more urgent matters, or if you prefer not to use MyChart, call our clinic nurse coordinator Astrid Auguste RN at [phone].    We have an on-call number for evenings and weekends. Please call this only if you are having uncontrolled symptoms or serious side effects from your medicines: [phone].     For refills, please give us a week (5 working days) notice. We don't always have providers available everyday to do refills. If you call the day you run out of your medicine, we may not be able to refill it in time, so call 5 days in advance!

## 2023-02-15 ENCOUNTER — MYC REFILL (OUTPATIENT)
Dept: TRANSPLANT | Facility: CLINIC | Age: 58
End: 2023-02-15
Payer: COMMERCIAL

## 2023-02-15 DIAGNOSIS — K31.84 GASTROPARESIS: ICD-10-CM

## 2023-02-15 RX ORDER — ACETAMINOPHEN 325 MG/10.15ML
650 LIQUID ORAL EVERY 6 HOURS PRN
Qty: 473 ML | Refills: 4 | Status: SHIPPED | OUTPATIENT
Start: 2023-02-15

## 2023-02-17 DIAGNOSIS — R11.0 NAUSEA: Primary | ICD-10-CM

## 2023-02-17 RX ORDER — SCOLOPAMINE TRANSDERMAL SYSTEM 1 MG/1
1 PATCH, EXTENDED RELEASE TRANSDERMAL
Qty: 10 PATCH | Refills: 11 | Status: SHIPPED | OUTPATIENT
Start: 2023-02-17 | End: 2023-05-09

## 2023-02-20 ENCOUNTER — VIRTUAL VISIT (OUTPATIENT)
Dept: PALLIATIVE CARE | Facility: CLINIC | Age: 58
End: 2023-02-20
Attending: SOCIAL WORKER
Payer: COMMERCIAL

## 2023-02-20 DIAGNOSIS — F43.21 ADJUSTMENT DISORDER WITH DEPRESSED MOOD: Primary | ICD-10-CM

## 2023-02-20 DIAGNOSIS — K31.84 GASTROPARESIS: ICD-10-CM

## 2023-02-20 PROCEDURE — 99204 OFFICE O/P NEW MOD 45 MIN: CPT | Mod: VID | Performed by: SOCIAL WORKER

## 2023-02-20 NOTE — NURSING NOTE
Is the patient currently in the state of [place]? YES    Visit mode:VIDEO    If the visit is dropped, the patient can be reconnected by: VIDEO VISIT: Text to cell phone: [phone]    Will anyone else be joining the visit? NO      How would you like to obtain your AVS? MyChart    Are changes needed to the allergy or medication list? NO    Patient denies any changes since echeck-in regarding medication and allergies and states all information entered during echeck-in remains accurate.    Comments or concerns regarding today's visit: None    Edison MARTINEZ

## 2023-02-20 NOTE — LETTER
Date:February 24, 2023      Provider requested that no letter be sent. Do not send.       [facility]

## 2023-02-20 NOTE — LETTER
2/20/2023       RE: Dinora Mcghee  [address] [facility] [address]     Dear Colleague,    Thank you for referring your patient, Dinora Mcghee, [facility]. Please see a copy of my visit note below.    Video-Visit Details    Type of service:  Video Visit    Video Start Time (time video started): 10:01am    Video End Time (time video stopped): 10:54am    Originating Location (pt. Location): Home        Distant Location (provider location):  Off-site    Mode of Communication:  Video Conference via [facility]      Palliative Care Counseling Services - Initial Assessment    PLEASE NOTE:  THIS IS A MENTAL HEALTH NOTE.  OTHER PROVIDERS VIEWING THIS NOTE SHOULD USE THIS ONLY FOR UNDERSTANDING THE CONTEXT OF THE PATIENT S EXPERIENCE.  TOPICS DESCRIBED IN THIS NOTE SHOULD NOT BE REFERENCED TO THE PATIENT BY MEDICAL PROVIDERS  Dinora is a 57 year old  woman is a  with multiple medical conditions: chronic pancreatitis s/p TPAIT 2016; long term complicated by gastroparesis, constipation, GERD, migraine HAs.  Gastroparesis started ~late 2020, n/v, weight loss. Extensive w/u and tx including multiple drugs. GJ placed Sept 2021, complications, ended up with a venting G tube + oral intake but in 2022 PO tolerance worsened.  12/2022 she had a surgical placement of a J tube + BAUDILIO + resection of densely adhered small bowel-->tube feeding started. Needed another surgery, SB resection, and several interventions afterwards due to J tube issues.  She has separate GT and JTs. Gets IVF at home. Has a port; Dinora seen today for initial palliative care counseling assessment via [facility].    Referred by:Dr. Baugh    Presenting Issues: Coping with illness    Preferred Name: Dinora    Mental Status Exam: (List all that apply)      Appearance: Appropriate      Eye Contact: Good       Orientation: Yes, x4      Mood: Normal      Affect:  "Appropriate      Thought Content: Clear         Thought Form: Logical      Psychomotor Behavior: Normal    Family:        Marital status:     Years : unknown      Name of spouse/partner: Norris      Children: 3 adult children       Parents: supportive and live local      Siblings: supportive       Other: has 8 great friends     Support system: Family and Friends    Living situation: House   Difficulty accessing and/or getting around living space: No   Other concerns: No     Employment history:      Current employment status:  self employed - owns a business         Kind of work:  Social work/health and       Spouses/SO current employment status: Employed full time      Kind of work: unknown     Education highest level: College    Financial: Worried with fatigue that her business will close and have financial implications.      Descriptor: Comfortable      Health insurance: BCBS    Legal concerns: No       Area(s) of concern: Not applicable    Health Care Directive: Has one:  Yes       If yes, copy in EMR: No       Basic information regarding health care directive provided:Yes        Health Care Agent(s): Named in health care directive   POLST? -educated on document today. Not complete.    Medical History/Issues (patient account):   Chronic pancreatitis s/p TPAIT 2016, had a few good years. When gastroparesis started more complications and in 12/22 has surgical placemtn of a J tube and BAUDILIO and bowel resection. Now has G and J tube, gets IVF at home and has a port. She has to take medications at certain times a day. Adjusting to new normal. Had tube feeding leak on way to [facility] to see family on Sunday and had to get changed and is frustrated cannot live \"normally\".     Pain/Discomfort Issues: no pain expressed recovering from surgery a few weeks ago.     Coping: Dinora is alert and oriented x 3, able to direct care and she is able to articulate her concerns. She is struggling with quality " of life, adjustment to tube feeding and ongoing coping with chronic disease.     Has completed in past EMDR with Maria Elena Abdalla in the past, has had another therapist did not click. She has training as a  and health and .    Sleep: Struggling with sleep as adjusting to noise from tube feeding machines. Feels fatigued and tired all the time.    Sexual Health/Intimacy: Not assessed.     Mental Health History and Current Review of Symptoms (patient account):     Depression in past?: Yes    Treatment in past?:  Yes EMDR, guided imagery   Treatment currently?:  No    Depression symptoms currently?:  - Anhedonia:  YES  - Hopeless or down mood:  Yes  - Sleep problems (too much or too little):  Yes  - Fatigue:  Yes  - Appetite (too much or too little):  No  - Excessively negative self perception:  No  - Trouble concentrating:  No  - Motor slowness:  No  - Current and/or recent thoughts of suicide:  No    Suicide risk screen:  - Past thoughts of suicide?  No  - Past attempts to end own life?  No  - If yes, how? n/a  - Protective factors currently? n/a  - Safety plan needed currently? n/a    Anxiety in past?: No   Treatment in past?  No   Treatment currently?  No     Anxiety symptoms currently?  - Nervous, on edge:  No  - Sleep problems:  Yes- waking up buzzing noises of tube feeding  - Worrying a lot/hard to manage worries:  Yes  Specific recent areas of worry/fear/concern: this is Dinora's - what is the new norm. Cannot work as she did prior and worried about her business needing to close.  - Tense, hard to relax:  No  - Feeling restless, hard to sit still:  No  - Irritable:  No  - Feeling of dread/ afraid that something awful may happen:  No  - How long? n/a  - Impacts on daily life? n/a    Grief vs Depression:  Endorses symptoms for: Grief and depressive episode    Psychological Trauma and/or Major Losses:   Medical trauma since diagnosis. Has completed EMDR treatment  Nightmares?    No  Thought  "about when not wanting to think about? No  Tried hard not to think about it? No  Avoided situations that reminded you of it? No  [place] constantly on guard, watchful, or easily startled? No  Felt numb or detached from others, activities, or your surroundings? No    Safety Screen:  History of being harmed or controlled by someone close to you?  No  Being hurt or controlled by someone close to you?  No  Worried will be harmed in future?  No  Worried will harm someone else in future?  No    CD History:   Using alcohol actively? No    Amount per week: 0  Current concerns (self or other) about alcohol or drug use? No  Past concerns and/or CD treatment? No      Yoli/Spirituality: To be explored at future visits.      Belong to a yoli community: Yes     Specify:  Unknown       Hope:      What do you hope for:    \"I hope for more energy and to reinvent myself with managing all that I am to stay alive\".      What gives you hope:    - Not asked today    Internal Resources (positive memories, sources of herminio): Family; Set goal of reading 50 books, knits, takes two dogs for walks; has 8 friends.     Perceived Needs: Help with coping with my \"new normal\". Finding meaning within this chronic disease progression.     Resource needs/Referrals: Referral to [facility] for meaning centered psychotherapy      Dinora is a 57 year old  woman is a  with multiple medical conditions: chronic pancreatitis s/p TPAIT 2016; long term complicated by gastroparesis, constipation, GERD, migraine HAs.  Gastroparesis started ~late 2020, n/v, weight loss. Extensive w/u and tx including multiple drugs. GJ placed Sept 2021, complications, ended up with a venting G tube + oral intake but in 2022 PO tolerance worsened.12/2022 she had a surgical placement of a J tube + BAUDILIO + resection of densely adhered small bowel-->tube feeding started. Needed another surgery, SB resection, and several interventions afterwards due to J tube " "issues.  She has separate GT and JTs. Gets IVF at home. Has a port; Dinora seen today for initial palliative care counseling assessment via [facility]. Referred by Dr. Baugh, palliative care. In addition to her medical diagnosis also meets criteria for adjustment disorder with depression. Their symptoms include feeling sad/tearful, hopelessness, not able to participate in activities feels tied to being at home. These symptoms began with J/G tube placement 1/31/23 and the client has experienced functional impairment in coping since returning home from the hospital with new tube feeding. Dinora verbalizes \"I have been coping with this illness for many years, and now with this complication this is my life forever. I live with medication times, tube feeding times and it is running my life.\" It appears that contributing factors for their adjustment to new routine and treatment include feeling concerned \"this is as good as my life is going to be with all these tubes?\". Dinora is expressing distress is out of proportion to the stressor and has impairment in her social occupational area of functioning.   Dinora presents as a caring, forward thinking, able to seek support and set limits in her life facing continued long term chronic condition. Her relationships remain intact and are reported supportive. 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 No cultural concerns. Their medical condition has the potential to influence their mental health in the following ways: grief with loss of independence and now dependence on tube feeding.   Other mental health diagnoses that were considered include: depressive episode. The symptoms related to these possible diagnoses can currently be explained by adjustment of new routine and of maintenance of tube feeding.  It is medically necessary for this client to " receive outpatient psychotherapy services at this time. If their current mental health symptoms go untreated it is likely to lead into a depressive episode. My recommendations for services for this client include narrative therapy, meaning centered psychotherapy. The client's prognosis from a mental health perspective is good.    Intervention: Initial palliative care counseling / clinical social work evaluation was conducted.  Palliative Care Counseling interventions available were discussed, including counseling related to serious illness, behavioral interventions for symptom management, consultation regarding goals of care/health care directive/POLST, and other interventions specific to the patient's situation or concerns.     Plan: Referral for meaning centered psychotherapy.    DSM5 Diagnoses:   Adjustment Disorders  309.0 (F43.21) With depressed mood    Time Spent with Patient/Family: 50 minutes  (Start 10:01a, end 10:54am)    JAIDA Mariscal, [facility]   Palliative Care    [phone]    DO NOT SEND ANY LETTERS        Again, thank you for allowing me to participate in the care of your patient.      Sincerely,    VASQUEZ Mariscal

## 2023-02-20 NOTE — PROGRESS NOTES
Video-Visit Details    Type of service:  Video Visit    Video Start Time (time video started): 10:01am    Video End Time (time video stopped): 10:54am    Originating Location (pt. Location): Home        Distant Location (provider location):  Off-site    Mode of Communication:  Video Conference via [facility]      Palliative Care Counseling Services - Initial Assessment    PLEASE NOTE:  THIS IS A MENTAL HEALTH NOTE.  OTHER PROVIDERS VIEWING THIS NOTE SHOULD USE THIS ONLY FOR UNDERSTANDING THE CONTEXT OF THE PATIENT S EXPERIENCE.  TOPICS DESCRIBED IN THIS NOTE SHOULD NOT BE REFERENCED TO THE PATIENT BY MEDICAL PROVIDERS  Dinora is a 57 year old  woman is a  with multiple medical conditions: chronic pancreatitis s/p TPAIT 2016; long term complicated by gastroparesis, constipation, GERD, migraine HAs.  Gastroparesis started ~late 2020, n/v, weight loss. Extensive w/u and tx including multiple drugs. GJ placed Sept 2021, complications, ended up with a venting G tube + oral intake but in 2022 PO tolerance worsened.  12/2022 she had a surgical placement of a J tube + BAUDILIO + resection of densely adhered small bowel-->tube feeding started. Needed another surgery, SB resection, and several interventions afterwards due to J tube issues.  She has separate GT and JTs. Gets IVF at home. Has a port; Dinora seen today for initial palliative care counseling assessment via [facility].    Referred by:Dr. Baugh    Presenting Issues: Coping with illness    Preferred Name: Dinora    Mental Status Exam: (List all that apply)      Appearance: Appropriate      Eye Contact: Good       Orientation: Yes, x4      Mood: Normal      Affect: Appropriate      Thought Content: Clear         Thought Form: Logical      Psychomotor Behavior: Normal    Family:        Marital status:     Years : unknown      Name of spouse/partner: Norris      Children: 3 adult children       Parents: supportive and live local      Siblings:  "supportive       Other: has 8 great friends     Support system: Family and Friends    Living situation: House   Difficulty accessing and/or getting around living space: No   Other concerns: No     Employment history:      Current employment status:  self employed - owns a business         Kind of work:  Social work/health and       Spouses/SO current employment status: Employed full time      Kind of work: unknown     Education highest level: College    Financial: Worried with fatigue that her business will close and have financial implications.      Descriptor: Comfortable      Health insurance: BCBS    Legal concerns: No       Area(s) of concern: Not applicable    Health Care Directive: Has one:  Yes       If yes, copy in EMR: No       Basic information regarding health care directive provided:Yes        Health Care Agent(s): Named in health care directive   POLST? -educated on document today. Not complete.    Medical History/Issues (patient account):   Chronic pancreatitis s/p TPAIT 2016, had a few good years. When gastroparesis started more complications and in 12/22 has surgical placemtn of a J tube and BAUDILIO and bowel resection. Now has G and J tube, gets IVF at home and has a port. She has to take medications at certain times a day. Adjusting to new normal. [facility] and had to get changed and is frustrated cannot live \"normally\".     Pain/Discomfort Issues: no pain expressed recovering from surgery a few weeks ago.     Coping: Dinora is alert and oriented x 3, able to direct care and she is able to articulate her concerns. She is struggling with quality of life, adjustment to tube feeding and ongoing coping with chronic disease.     Has completed in past EMDR with Maria Elena Abdalla in the past, has had another therapist did not click. She has training as a  and health and .    Sleep: Struggling with sleep as adjusting to " noise from tube feeding machines. Feels fatigued and tired all the time.    Sexual Health/Intimacy: Not assessed.     Mental Health History and Current Review of Symptoms (patient account):     Depression in past?: Yes    Treatment in past?:  Yes EMDR, guided imagery   Treatment currently?:  No    Depression symptoms currently?:  - Anhedonia:  YES  - Hopeless or down mood:  Yes  - Sleep problems (too much or too little):  Yes  - Fatigue:  Yes  - Appetite (too much or too little):  No  - Excessively negative self perception:  No  - Trouble concentrating:  No  - Motor slowness:  No  - Current and/or recent thoughts of suicide:  No    Suicide risk screen:  - Past thoughts of suicide?  No  - Past attempts to end own life?  No  - If yes, how? n/a  - Protective factors currently? n/a  - Safety plan needed currently? n/a    Anxiety in past?: No   Treatment in past?  No   Treatment currently?  No     Anxiety symptoms currently?  - Nervous, on edge:  No  - Sleep problems:  Yes- waking up buzzing noises of tube feeding  - Worrying a lot/hard to manage worries:  Yes  Specific recent areas of worry/fear/concern: this is Dinora's - what is the new norm. Cannot work as she did prior and worried about her business needing to close.  - Tense, hard to relax:  No  - Feeling restless, hard to sit still:  No  - Irritable:  No  - Feeling of dread/ afraid that something awful may happen:  No  - How long? n/a  - Impacts on daily life? n/a    Grief vs Depression:  Endorses symptoms for: Grief and depressive episode    Psychological Trauma and/or Major Losses:   Medical trauma since diagnosis. Has completed EMDR treatment  Nightmares?    No  Thought about when not wanting to think about? No  Tried hard not to think about it? No  Avoided situations that reminded you of it? No  [place] constantly on guard, watchful, or easily startled? No  Felt numb or detached from others, activities, or your surroundings? No    Safety Screen:  History of being  "harmed or controlled by someone close to you?  No  Being hurt or controlled by someone close to you?  No  Worried will be harmed in future?  No  Worried will harm someone else in future?  No    CD History:   Using alcohol actively? No    Amount per week: 0  Current concerns (self or other) about alcohol or drug use? No  Past concerns and/or CD treatment? No      Yoli/Spirituality: To be explored at future visits.      Belong to a yoli community: Yes     Specify:  Unknown       Hope:      What do you hope for:    \"I hope for more energy and to reinvent myself with managing all that I am to stay alive\".      What gives you hope:    - Not asked today    Internal Resources (positive memories, sources of herminio): Family; Set goal of reading 50 books, knits, takes two dogs for walks; has 8 friends.     Perceived Needs: Help with coping with my \"new normal\". Finding meaning within this chronic disease progression.     Resource needs/Referrals: Referral to [facility] Ghosh [facility] for meaning centered psychotherapy      Dinora is a 57 year old  woman is a  with multiple medical conditions: chronic pancreatitis s/p TPAIT 2016; long term complicated by gastroparesis, constipation, GERD, migraine HAs.  Gastroparesis started ~late 2020, n/v, weight loss. Extensive w/u and tx including multiple drugs. GJ placed Sept 2021, complications, ended up with a venting G tube + oral intake but in 2022 PO tolerance worsened.12/2022 she had a surgical placement of a J tube + BAUDILIO + resection of densely adhered small bowel-->tube feeding started. Needed another surgery, SB resection, and several interventions afterwards due to J tube issues.  She has separate GT and JTs. Gets IVF at home. Has a port; Dinora seen today for initial palliative care counseling assessment via [facility]. Referred by Dr. Baugh, palliative care. In addition to her medical diagnosis also meets criteria for adjustment disorder with depression. Their symptoms " "include feeling sad/tearful, hopelessness, not able to participate in activities feels tied to being at home. These symptoms began with J/G tube placement 1/31/23 and the client has experienced functional impairment in coping since returning home from the hospital with new tube feeding. Dinora verbalizes \"I have been coping with this illness for many years, and now with this complication this is my life forever. I live with medication times, tube feeding times and it is running my life.\" It appears that contributing factors for their adjustment to new routine and treatment include feeling concerned \"this is as good as my life is going to be with all these tubes?\". Dinora is expressing distress is out of proportion to the stressor and has impairment in her social occupational area of functioning.   Dinora presents as a caring, forward thinking, able to seek support and set limits in her life facing continued long term chronic condition. Her relationships remain intact and are reported supportive. 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 No cultural concerns. Their medical condition has the potential to influence their mental health in the following ways: grief with loss of independence and now dependence on tube feeding.   Other mental health diagnoses that were considered include: depressive episode. The symptoms related to these possible diagnoses can currently be explained by adjustment of new routine and of maintenance of tube feeding.  It is medically necessary for this client to receive outpatient psychotherapy services at this time. If their current mental health symptoms go untreated it is likely to lead into a depressive episode. My recommendations for services for this client include narrative therapy, meaning centered psychotherapy. The client's prognosis from a mental " health perspective is good.    Intervention: Initial palliative care counseling / clinical social work evaluation was conducted.  Palliative Care Counseling interventions available were discussed, including counseling related to serious illness, behavioral interventions for symptom management, consultation regarding goals of care/health care directive/POLST, and other interventions specific to the patient's situation or concerns.     Plan: Referral for meaning centered psychotherapy.    DSM5 Diagnoses:   Adjustment Disorders  309.0 (F43.21) With depressed mood    Time Spent with Patient/Family: 50 minutes  (Start 10:01a, end 10:54am)    JAIDA Mariscal, [facility]   Palliative Care    [phone]    DO NOT SEND ANY LETTERS

## 2023-02-21 ASSESSMENT — ENCOUNTER SYMPTOMS
RECTAL PAIN: 0
CHILLS: 0
DECREASED APPETITE: 0
SORE THROAT: 0
SMELL DISTURBANCE: 0
NAUSEA: 1
INCREASED ENERGY: 0
BOWEL INCONTINENCE: 0
ABDOMINAL PAIN: 1
DIARRHEA: 0
TROUBLE SWALLOWING: 0
SINUS PAIN: 0
DECREASED APPETITE: 0
CONSTIPATION: 0
HALLUCINATIONS: 0
WEIGHT GAIN: 0
WEIGHT LOSS: 0
BLOATING: 1
ABDOMINAL PAIN: 1
WEIGHT LOSS: 0
RECTAL PAIN: 0
NIGHT SWEATS: 0
JAUNDICE: 0
FATIGUE: 1
SORE THROAT: 0
NIGHT SWEATS: 0
NECK MASS: 0
POLYPHAGIA: 0
HEARTBURN: 1
INCREASED ENERGY: 0
VOMITING: 1
NECK MASS: 0
CONSTIPATION: 0
BOWEL INCONTINENCE: 0
POLYDIPSIA: 0
BLOATING: 1
HOARSE VOICE: 0
SINUS PAIN: 0
WEIGHT GAIN: 0
FEVER: 0
FATIGUE: 1
ALTERED TEMPERATURE REGULATION: 0
POLYDIPSIA: 0
HALLUCINATIONS: 0
BLOOD IN STOOL: 0
TASTE DISTURBANCE: 0
VOMITING: 1
BLOOD IN STOOL: 0
SMELL DISTURBANCE: 0
SINUS CONGESTION: 0
HEARTBURN: 1
ALTERED TEMPERATURE REGULATION: 0
TROUBLE SWALLOWING: 0
DIARRHEA: 0
HOARSE VOICE: 0
POLYPHAGIA: 0
JAUNDICE: 0
FEVER: 0
NAUSEA: 1
CHILLS: 0
TASTE DISTURBANCE: 0
SINUS CONGESTION: 0

## 2023-02-22 ENCOUNTER — OFFICE VISIT (OUTPATIENT)
Dept: SURGERY | Facility: CLINIC | Age: 58
End: 2023-02-22
Payer: COMMERCIAL

## 2023-02-22 VITALS
OXYGEN SATURATION: 98 % | HEART RATE: 101 BPM | DIASTOLIC BLOOD PRESSURE: 83 MMHG | BODY MASS INDEX: 21.22 KG/M2 | HEIGHT: 67 IN | SYSTOLIC BLOOD PRESSURE: 116 MMHG | WEIGHT: 135.2 LBS

## 2023-02-22 DIAGNOSIS — Z78.9 ENCOUNTER FOR GASTROJEJUNAL (GJ) TUBE PLACEMENT: ICD-10-CM

## 2023-02-22 DIAGNOSIS — K31.84 GASTROPARESIS: ICD-10-CM

## 2023-02-22 DIAGNOSIS — K86.89 PANCREATIC INSUFFICIENCY: ICD-10-CM

## 2023-02-22 DIAGNOSIS — E23.2 DIABETES INSIPIDUS (H): ICD-10-CM

## 2023-02-22 DIAGNOSIS — R10.13 EPIGASTRIC PAIN: ICD-10-CM

## 2023-02-22 DIAGNOSIS — Z98.890 POSTOPERATIVE STATE: Primary | ICD-10-CM

## 2023-02-22 DIAGNOSIS — R10.13 EPIGASTRIC PAIN: Primary | ICD-10-CM

## 2023-02-22 DIAGNOSIS — K94.23 GASTROSTOMY TUBE OBSTRUCTION (H): Primary | ICD-10-CM

## 2023-02-22 PROCEDURE — 99024 POSTOP FOLLOW-UP VISIT: CPT | Performed by: SURGERY

## 2023-02-22 RX ORDER — OXYCODONE HCL 5 MG/5 ML
5-10 SOLUTION, ORAL ORAL EVERY 4 HOURS PRN
Qty: 473 ML | Refills: 0 | Status: SHIPPED | OUTPATIENT
Start: 2023-02-22 | End: 2023-04-25

## 2023-02-22 RX ORDER — CYCLOBENZAPRINE HCL 10 MG
10 TABLET ORAL 2 TIMES DAILY PRN
Qty: 60 TABLET | Refills: 11 | Status: ON HOLD | OUTPATIENT
Start: 2023-02-22 | End: 2023-06-07

## 2023-02-22 RX ORDER — PROMETHAZINE HYDROCHLORIDE AND PHENYLEPHRINE HYDROCHLORIDE 6.25; 5 MG/5ML; MG/5ML
SYRUP ORAL
Qty: 473 ML | Refills: 3 | Status: SHIPPED | OUTPATIENT
Start: 2023-02-22 | End: 2023-10-27

## 2023-02-22 RX ORDER — SENNOSIDES 8.8 MG/5ML
LIQUID ORAL
Qty: 237 ML | Refills: 0 | OUTPATIENT
Start: 2023-02-22

## 2023-02-22 ASSESSMENT — PAIN SCALES - GENERAL: PAINLEVEL: MILD PAIN (3)

## 2023-02-22 NOTE — LETTER
2/22/2023      RE: Dinora Mcghee  [address]  [place] [address]       See dictated note: [id number]  GB    Answers for HPI/ROS submitted by the patient on 2/21/2023  General Symptoms: Yes  Skin Symptoms: No  HENT Symptoms: Yes  EYE SYMPTOMS: No  HEART SYMPTOMS: No  LUNG SYMPTOMS: No  INTESTINAL SYMPTOMS: Yes  URINARY SYMPTOMS: No  GYNECOLOGIC SYMPTOMS: No  BREAST SYMPTOMS: No  SKELETAL SYMPTOMS: No  BLOOD SYMPTOMS: No  NERVOUS SYSTEM SYMPTOMS: No  MENTAL HEALTH SYMPTOMS: No  Ear pain: No  Ear discharge: No  Hearing loss: No  Tinnitus: No  Nosebleeds: No  Congestion: No  Sinus pain: No  Trouble swallowing: No   Voice hoarseness: No  Mouth sores: No  Sore throat: No  Tooth pain: No  Gum tenderness: No  Bleeding gums: No  Change in taste: No  Change in sense of smell: No  Dry mouth: Yes  Hearing aid used: No  Neck lump: No  Fever: No  Loss of appetite: No  Weight loss: No  Weight gain: No  Fatigue: Yes  Night sweats: No  Chills: No  Increased stress: No  Excessive hunger: No  Excessive thirst: No  Feeling hot or cold when others believe the temperature is normal: No  Loss of height: No  Post-operative complications: No  Surgical site pain: Yes  Hallucinations: No  Change in or Loss of Energy: No  Hyperactivity: No  Confusion: No  Heart burn or indigestion: Yes  Nausea: Yes  Vomiting: Yes  Abdominal pain: Yes  Bloating: Yes  Constipation: No  Diarrhea: No  Blood in stool: No  Black stools: No  Rectal or Anal pain: No  Fecal incontinence: No  Yellowing of skin or eyes: No  Vomit with blood: No  Change in stools: No        Service Date: 02/22/2023    Ms. Loo is here for followup about 2-1/2 weeks after an exploratory laparotomy for small-bowel perforation suffered during a feeding tube change.  Overall, she is doing reasonably well.  Her wounds are well healed.  Her staples are removed.  There is no incisional hernia.  There is no erythema or induration.  Her J-tube site is clean and dry and carefully dressed.  We took down the dressing and replaced the dressing.  This appears to be intact.  We discussed her symptomatology, which includes lower abdominal pain since surgery.  This appears to be slowly getting better.  She is tolerating tube feeds at 45 mL an hour and appears to be with good nutritional support.  She is receiving daily IV fluids and is venting her gastrostomy tube when necessary.  I would like to see her again in 6 weeks.    Elver Bauer MD        D: 2023   T: 2023   MT: carleen    Name:     MALINI DAVIS  MRN:      [id number]        Account:      [id number]   :      1965           Service Date: 2023       Document: [id number]      Elver Bauer MD

## 2023-02-22 NOTE — NURSING NOTE
"Chief Complaint   Patient presents with     RECHECK     Hospital follow up & staple removal       Vitals:    02/22/23 0658   BP: 116/83   BP Location: Left arm   Patient Position: Sitting   Cuff Size: Adult Regular   Pulse: 101   SpO2: 98%   Weight: 61.3 kg (135 lb 3.2 oz)   Height: 1.702 m (5' 7\")       Body mass index is 21.18 kg/m .                          Buddy Issa, EMT    "

## 2023-02-22 NOTE — PROGRESS NOTES
Service Date: 2023    Ms. Loo is here for followup about 2-1/2 weeks after an exploratory laparotomy for small-bowel perforation suffered during a feeding tube change.  Overall, she is doing reasonably well.  Her wounds are well healed.  Her staples are removed.  There is no incisional hernia.  There is no erythema or induration.  Her J-tube site is clean and dry and carefully dressed. We took down the dressing and replaced the dressing.  This appears to be intact.  We discussed her symptomatology, which includes lower abdominal pain since surgery.  This appears to be slowly getting better.  She is tolerating tube feeds at 45 mL an hour and appears to be with good nutritional support.  She is receiving daily IV fluids and is venting her gastrostomy tube when necessary.  I would like to see her again in 6 weeks.    Elver Bauer MD        D: 2023   T: 2023   MT: carleen    Name:     MALINI DAVIS  MRN:      [id number]        Account:      [id number]   :      1965           Service Date: 2023       Document: [id number]

## 2023-02-22 NOTE — PROGRESS NOTES
See dictated note: [id number]      Answers for HPI/ROS submitted by the patient on 2/21/2023  General Symptoms: Yes  Skin Symptoms: No  HENT Symptoms: Yes  EYE SYMPTOMS: No  HEART SYMPTOMS: No  LUNG SYMPTOMS: No  INTESTINAL SYMPTOMS: Yes  URINARY SYMPTOMS: No  GYNECOLOGIC SYMPTOMS: No  BREAST SYMPTOMS: No  SKELETAL SYMPTOMS: No  BLOOD SYMPTOMS: No  NERVOUS SYSTEM SYMPTOMS: No  MENTAL HEALTH SYMPTOMS: No  Ear pain: No  Ear discharge: No  Hearing loss: No  Tinnitus: No  Nosebleeds: No  Congestion: No  Sinus pain: No  Trouble swallowing: No   Voice hoarseness: No  Mouth sores: No  Sore throat: No  Tooth pain: No  Gum tenderness: No  Bleeding gums: No  Change in taste: No  Change in sense of smell: No  Dry mouth: Yes  Hearing aid used: No  Neck lump: No  Fever: No  Loss of appetite: No  Weight loss: No  Weight gain: No  Fatigue: Yes  Night sweats: No  Chills: No  Increased stress: No  Excessive hunger: No  Excessive thirst: No  Feeling hot or cold when others believe the temperature is normal: No  Loss of height: No  Post-operative complications: No  Surgical site pain: Yes  Hallucinations: No  Change in or Loss of Energy: No  Hyperactivity: No  Confusion: No  Heart burn or indigestion: Yes  Nausea: Yes  Vomiting: Yes  Abdominal pain: Yes  Bloating: Yes  Constipation: No  Diarrhea: No  Blood in stool: No  Black stools: No  Rectal or Anal pain: No  Fecal incontinence: No  Yellowing of skin or eyes: No  Vomit with blood: No  Change in stools: No

## 2023-02-22 NOTE — LETTER
2/22/2023       RE: Dinora Mcghee  [address] [facility] [address]     Dear Colleague,    Thank you for referring your patient, Dinora Mcghee, to the [facility] [place] [facility]. Please see a copy of my visit note below.    See dictated note: [id number]  GB    Answers for HPI/ROS submitted by the patient on 2/21/2023  General Symptoms: Yes  Skin Symptoms: No  HENT Symptoms: Yes  EYE SYMPTOMS: No  HEART SYMPTOMS: No  LUNG SYMPTOMS: No  INTESTINAL SYMPTOMS: Yes  URINARY SYMPTOMS: No  GYNECOLOGIC SYMPTOMS: No  BREAST SYMPTOMS: No  SKELETAL SYMPTOMS: No  BLOOD SYMPTOMS: No  NERVOUS SYSTEM SYMPTOMS: No  MENTAL HEALTH SYMPTOMS: No  Ear pain: No  Ear discharge: No  Hearing loss: No  Tinnitus: No  Nosebleeds: No  Congestion: No  Sinus pain: No  Trouble swallowing: No   Voice hoarseness: No  Mouth sores: No  Sore throat: No  Tooth pain: No  Gum tenderness: No  Bleeding gums: No  Change in taste: No  Change in sense of smell: No  Dry mouth: Yes  Hearing aid used: No  Neck lump: No  Fever: No  Loss of appetite: No  Weight loss: No  Weight gain: No  Fatigue: Yes  Night sweats: No  Chills: No  Increased stress: No  Excessive hunger: No  Excessive thirst: No  Feeling hot or cold when others believe the temperature is normal: No  Loss of height: No  Post-operative complications: No  Surgical site pain: Yes  Hallucinations: No  Change in or Loss of Energy: No  Hyperactivity: No  Confusion: No  Heart burn or indigestion: Yes  Nausea: Yes  Vomiting: Yes  Abdominal pain: Yes  Bloating: Yes  Constipation: No  Diarrhea: No  Blood in stool: No  Black stools: No  Rectal or Anal pain: No  Fecal incontinence: No  Yellowing of skin or eyes: No  Vomit with blood: No  Change in stools: No          Again, thank you for allowing me to participate in the care of your patient.      Sincerely,    Elver Bauer MD

## 2023-02-28 ENCOUNTER — TELEPHONE (OUTPATIENT)
Dept: ANESTHESIOLOGY | Facility: CLINIC | Age: 58
End: 2023-02-28
Payer: COMMERCIAL

## 2023-02-28 DIAGNOSIS — K94.23 GASTROSTOMY TUBE OBSTRUCTION (H): ICD-10-CM

## 2023-02-28 RX ORDER — METHOCARBAMOL 500 MG/1
500 TABLET, FILM COATED ORAL 4 TIMES DAILY PRN
Qty: 120 TABLET | Refills: 1 | Status: SHIPPED | OUTPATIENT
Start: 2023-02-28 | End: 2023-04-18

## 2023-02-28 NOTE — TELEPHONE ENCOUNTER
Methocarbamol refilled, No Changes. Pt assisted to schedule follow up appointment for 4/18/23.     Rossi Gomez LPN

## 2023-02-28 NOTE — TELEPHONE ENCOUNTER
Health Call Center    Phone Message    May a detailed message be left on voicemail: yes     Reason for Call: Medication Refill Request    Has the patient contacted the pharmacy for the refill? Yes   Name of medication being requested: methocarbamol (ROBAXIN) 500 MG tablet  Provider who prescribed the medication: Dr. Mahajan  Pharmacy: [id number] [address] AT [facility] [address]  Date medication is needed: 3/1/2023    She states she requested medication from pharmacy more than a week ago.    Patient states she recently had a procedure with Dr. Bauer and was prescribed oxyCODONE (ROXICODONE) 5 MG/5ML solution post surgery but states she does not know if that would be an issue.     Patient is wondering if she is required to have a follow up visit, she states the medication is working well for her.      Action Taken: Message routed to:  [facility] ([facility] Pain    Travel Screening: Not Applicable

## 2023-03-02 DIAGNOSIS — Z90.410 POST-PANCREATECTOMY DIABETES (H): ICD-10-CM

## 2023-03-02 DIAGNOSIS — E16.2 HYPOGLYCEMIA: ICD-10-CM

## 2023-03-02 DIAGNOSIS — E89.1 POST-PANCREATECTOMY DIABETES (H): ICD-10-CM

## 2023-03-02 DIAGNOSIS — E13.9 POST-PANCREATECTOMY DIABETES (H): ICD-10-CM

## 2023-03-03 DIAGNOSIS — E16.2 HYPOGLYCEMIA: ICD-10-CM

## 2023-03-06 ENCOUNTER — LAB (OUTPATIENT)
Dept: LAB | Facility: CLINIC | Age: 58
End: 2023-03-06
Payer: COMMERCIAL

## 2023-03-06 ENCOUNTER — OFFICE VISIT (OUTPATIENT)
Dept: INTERNAL MEDICINE | Facility: CLINIC | Age: 58
End: 2023-03-06
Payer: COMMERCIAL

## 2023-03-06 VITALS
DIASTOLIC BLOOD PRESSURE: 83 MMHG | OXYGEN SATURATION: 99 % | WEIGHT: 134.2 LBS | HEIGHT: 68 IN | BODY MASS INDEX: 20.34 KG/M2 | SYSTOLIC BLOOD PRESSURE: 119 MMHG | HEART RATE: 106 BPM

## 2023-03-06 DIAGNOSIS — Z12.31 VISIT FOR SCREENING MAMMOGRAM: ICD-10-CM

## 2023-03-06 DIAGNOSIS — E03.9 HYPOTHYROIDISM, UNSPECIFIED TYPE: ICD-10-CM

## 2023-03-06 DIAGNOSIS — Z00.00 ROUTINE GENERAL MEDICAL EXAMINATION AT A HEALTH CARE FACILITY: Primary | ICD-10-CM

## 2023-03-06 DIAGNOSIS — R14.2 FLATULENCE, ERUCTATION AND GAS PAIN: ICD-10-CM

## 2023-03-06 DIAGNOSIS — N95.2 ATROPHIC VAGINITIS: ICD-10-CM

## 2023-03-06 DIAGNOSIS — E10.9 TYPE 1 DIABETES MELLITUS WITHOUT COMPLICATION (H): ICD-10-CM

## 2023-03-06 DIAGNOSIS — R14.3 FLATULENCE, ERUCTATION AND GAS PAIN: ICD-10-CM

## 2023-03-06 DIAGNOSIS — R14.1 FLATULENCE, ERUCTATION AND GAS PAIN: ICD-10-CM

## 2023-03-06 DIAGNOSIS — F32.9 CURRENT EPISODE OF MAJOR DEPRESSIVE DISORDER WITHOUT PRIOR EPISODE, UNSPECIFIED DEPRESSION EPISODE SEVERITY: ICD-10-CM

## 2023-03-06 LAB
CHOLEST SERPL-MCNC: 170 MG/DL
CREAT UR-MCNC: 26.3 MG/DL
HDLC SERPL-MCNC: 59 MG/DL
LDLC SERPL CALC-MCNC: 87 MG/DL
MICROALBUMIN UR-MCNC: <12 MG/L
MICROALBUMIN/CREAT UR: NORMAL MG/G{CREAT}
NONHDLC SERPL-MCNC: 111 MG/DL
TRIGL SERPL-MCNC: 118 MG/DL
TSH SERPL DL<=0.005 MIU/L-ACNC: 0.99 UIU/ML (ref 0.3–4.2)

## 2023-03-06 PROCEDURE — 99000 SPECIMEN HANDLING OFFICE-LAB: CPT | Performed by: PATHOLOGY

## 2023-03-06 PROCEDURE — 82570 ASSAY OF URINE CREATININE: CPT | Performed by: PATHOLOGY

## 2023-03-06 PROCEDURE — 36415 COLL VENOUS BLD VENIPUNCTURE: CPT | Performed by: PATHOLOGY

## 2023-03-06 PROCEDURE — 99214 OFFICE O/P EST MOD 30 MIN: CPT | Mod: 25 | Performed by: INTERNAL MEDICINE

## 2023-03-06 PROCEDURE — 84443 ASSAY THYROID STIM HORMONE: CPT | Performed by: PATHOLOGY

## 2023-03-06 PROCEDURE — 82043 UR ALBUMIN QUANTITATIVE: CPT | Performed by: PATHOLOGY

## 2023-03-06 PROCEDURE — 99396 PREV VISIT EST AGE 40-64: CPT | Performed by: INTERNAL MEDICINE

## 2023-03-06 PROCEDURE — 80061 LIPID PANEL: CPT | Performed by: PATHOLOGY

## 2023-03-06 RX ORDER — ESTRADIOL 10 UG/1
INSERT VAGINAL
Qty: 24 TABLET | Refills: 2 | Status: SHIPPED | OUTPATIENT
Start: 2023-03-06 | End: 2024-03-20

## 2023-03-06 NOTE — NURSING NOTE
Dinora Mcghee is a 57 year old female patient that presents today in clinic for the following:    Chief Complaint   Patient presents with     Establish Care     Physical     The patient's allergies and medications were reviewed as noted. A set of vitals were recorded as noted without incident. The patient does not have any other questions for the provider.    Ebenezer Hayden, EMT at 2:24 PM on 3/6/2023

## 2023-03-06 NOTE — PROGRESS NOTES
SUBJECTIVE:   CC: Dinora is a 57 year old  woman is a  with multiple medical conditions: chronic pancreatitis s/p TPAIT 2016; long term complicated by gastroparesis, constipation, GERD, migraine  who presents for preventive health visit.   Recent Medical hx acc to the records  She had chronic pancreatitis with TPAIT in 2016. She then started getting bloated in 2020. Gastroparesis started ~late 2020, n/v, weight loss. Extensive w/u and tx including multiple drugs. GJ placed Sept 2021, complications, ended up with a venting G tube + oral intake but in 2022 PO tolerance worsened.  12/2022 she had a surgical placement of a J tube + BAUDILIO + resection of densely adhered small bowel-->tube feeding started. Needed another surgery, SB resection, and several interventions afterwards due to J tube issues.  She has separate GT and JTs. Gets IVF at home. Has a port.  Having a lot of flatulence recently.      Above documentation completed by ERNIE MELENDEZ      Patient has been advised of split billing requirements and indicates understanding: Yes  Healthy Habits:    Do you get at least three servings of calcium containing foods daily (dairy, green leafy vegetables, etc.)? Tube fed    Amount of exercise or daily activities, outside of work: 0.5 hour(s) per day    Problems taking medications regularly No    Medication side effects: Yes, gastric pressure    Have you had an eye exam in the past two years? yes    Do you see a dentist twice per year? yes    Do you have sleep apnea, excessive snoring or daytime drowsiness?yes        Today's PHQ-2 Score:   PHQ-2 ( 1999 Pf[employer]) 1/19/2023 11/2/2022   Q1: Little interest or pleasure in doing things 2 1   Q2: Feeling down, depressed or hopeless 1 1   PHQ-2 Score 3 2   PHQ-2 Total Score (12-17 Years)- Positive if 3 or more points; Administer PHQ-A if positive - -   Q1: Little interest or pleasure in doing things More than half the days -   Q2: Feeling down, depressed or  "hopeless Several days -   PHQ-2 Score 3 -     Abuse: Current or Past(Physical, Sexual or Emotional)- No  Do you feel safe in your environment? Yes        Social History     Tobacco Use     Smoking status: Former     Packs/day: 0.50     Years: 6.00     Pack years: 3.00     Types: Cigarettes     Start date: 1985     Quit date: 1992     Years since quittin.1     Smokeless tobacco: Not on file     Tobacco comments:     no 2nd hand   Substance Use Topics     Alcohol use: Not Currently     Alcohol/week: 3.0 - 6.0 standard drinks     Types: 1 - 2 Glasses of wine, 1 - 2 Cans of beer, 1 - 2 Shots of liquor per week     Comment: none since IGG     If you drink alcohol do you typically have >3 drinks per day or >7 drinks per week? No                     Reviewed orders with patient.  Reviewed health maintenance and updated orders accordingly - Yes      FHS-7: No flowsheet data found.    Pertinent mammograms are reviewed under the imaging tab.    Pertinent mammograms are reviewed under the imaging tab.  History of abnormal Pap smear: Status post benign hysterectomy. Health Maintenance and Surgical History updated.     Reviewed and updated as needed this visit by clinical staff   Tobacco  Allergies  Meds  Problems   Surg Hx           Reviewed and updated as needed this visit by Provider   Tobacco    Problems   Surg Hx            ROS:  See list at end of note    OBJECTIVE:   /83 (BP Location: Right arm, Patient Position: Sitting, Cuff Size: Adult Regular)   Pulse 106   Ht 1.719 m (5' 7.68\")   Wt 60.9 kg (134 lb 3.2 oz)   SpO2 99%   BMI 20.60 kg/m    EXAM:  GENERAL: healthy, alert and no distress  EYES: Eyes grossly normal to inspection, PERRL and conjunctivae and sclerae normal  HENT: ear canals and TM's normal, nose and mouth without ulcers or lesions  NECK: no adenopathy, no asymmetry, masses, or scars and thyroid normal to palpation  RESP: lungs clear to auscultation - no rales, rhonchi or " "wheezes  CV: regular rate and rhythm, normal S1 S2, no S3 or S4, no murmur, click or rub, no peripheral edema   ABDOMEN: soft, G and J tubes in place  MS: no gross musculoskeletal defects noted, no edema  SKIN: no suspicious lesions or rashes  NEURO: Normal strength and tone, mentation intact and speech normal  PSYCH: mentation appears normal, affect normal/bright      ASSESSMENT/PLAN:   (Z00.00) Routine general medical examination at a health care facility  (primary encounter diagnosis)      Pap smear: s/p hysterectomy    Immunizations: She thinks her pneumonia and Hep B vax are UTD and will check outside records    Lab Studies: as below    Mammogram: ordered    Colonoscopy/FIT: UTD       (E10.9) Type 1 diabetes mellitus without complication (H)  Comment: Following with endocrine, last A1C very good at 5.5.  Checking labs as below.   Plan: Lipid panel reflex to direct LDL Non-fasting,         Albumin Random Urine Quantitative with Creat         Ratio            (E03.9) Hypothyroidism, unspecified type  Comment: On levothyroxine 137mcg, TSH in process  Plan: TSH with free T4 reflex            (Z12.31) Visit for screening mammogram  Comment:   Plan: MA Screen Bilateral w/Ankit            (N95.2) Atrophic vaginitis  Comment: Refill provided, med works well  Plan: estradiol (VAGIFEM) 10 MCG TABS vaginal tablet            (R14.3,  R14.1,  R14.2) Flatulence, eructation and gas pain  Comment: She is going to to discuss with her nutritionist; she is on continuous tube feeds      (F32.9) Current episode of major depressive disorder without prior episode, unspecified depression episode severity  Comment: Following with therapist through palliative clinic    COUNSELING:   Reviewed preventive health counseling, as reflected in patient instructions    Estimated body mass index is 20.6 kg/m  as calculated from the following:    Height as of this encounter: 1.719 m (5' 7.68\").    Weight as of this encounter: 60.9 kg (134 lb 3.2 " oz).    She reports that she quit smoking about 31 years ago. Her smoking use included cigarettes. She started smoking about 37 years ago. She has a 3.00 pack-year smoking history. She does not have any smokeless tobacco history on file.    Juan Jose Gomez MD  [facility]IC[facility] [place]    Answers for HPI/ROS submitted by the patient on 2/21/2023  General Symptoms: Yes  Skin Symptoms: No  HENT Symptoms: Yes  EYE SYMPTOMS: No  HEART SYMPTOMS: No  LUNG SYMPTOMS: No  INTESTINAL SYMPTOMS: Yes  URINARY SYMPTOMS: No  GYNECOLOGIC SYMPTOMS: No  BREAST SYMPTOMS: No  SKELETAL SYMPTOMS: No  BLOOD SYMPTOMS: No  NERVOUS SYSTEM SYMPTOMS: No  MENTAL HEALTH SYMPTOMS: No  Ear pain: No  Ear discharge: No  Hearing loss: No  Tinnitus: No  Nosebleeds: No  Congestion: No  Sinus pain: No  Trouble swallowing: No   Voice hoarseness: No  Mouth sores: No  Sore throat: No  Tooth pain: No  Gum tenderness: No  Bleeding gums: No  Change in taste: No  Change in sense of smell: No  Dry mouth: Yes  Hearing aid used: No  Neck lump: No  Fever: No  Loss of appetite: No  Weight loss: No  Weight gain: No  Fatigue: Yes  Night sweats: No  Chills: No  Increased stress: No  Excessive hunger: No  Excessive thirst: No  Feeling hot or cold when others believe the temperature is normal: No  Loss of height: No  Post-operative complications: No  Surgical site pain: Yes  Hallucinations: No  Change in or Loss of Energy: No  Hyperactivity: No  Confusion: No  Heart burn or indigestion: Yes  Nausea: Yes  Vomiting: Yes  Abdominal pain: Yes  Bloating: Yes  Constipation: No  Diarrhea: No  Blood in stool: No  Black stools: No  Rectal or Anal pain: No  Fecal incontinence: No  Yellowing of skin or eyes: No  Vomit with blood: No  Change in stools: No

## 2023-03-06 NOTE — PATIENT INSTRUCTIONS
Mammogram was ordered for May.  Please call [phone] to schedule.     Check to see when your last pneumonia vaccine was and if you got the hepatitis B vaccines in the past.    Preventive Health Recommendations  Female Ages 50 - 64    Yearly exam: See your health care provider every year in order to  o Review health changes.   o Discuss preventive care.    o Review your medicines if your doctor has prescribed any.      Get a Pap test every three years (unless you have an abnormal result and your provider advises testing more often).    If you get Pap tests with HPV test, you only need to test every 5 years, unless you have an abnormal result.     You do not need a Pap test if your uterus was removed (hysterectomy) and you have not had cancer.    You should be tested each year for STDs (sexually transmitted diseases) if you're at risk.     Have a mammogram every 1 to 2 years.    Have a colonoscopy at age 50, or have a yearly FIT test (stool test). These exams screen for colon cancer.      Have a cholesterol test every 5 years, or more often if advised.    Have a diabetes test (fasting glucose) every three years. If you are at risk for diabetes, you should have this test more often.     If you are at risk for osteoporosis (brittle bone disease), think about having a bone density scan (DEXA).    Shots: Get a flu shot each year. Get a tetanus shot every 10 years.    Nutrition:     Eat at least 5 servings of fruits and vegetables each day.    Eat whole-grain bread, whole-wheat pasta and brown rice instead of white grains and rice.    Get adequate Calcium and Vitamin D.     Lifestyle    Exercise at least 150 minutes a week (30 minutes a day, 5 days a week). This will help you control your weight and prevent disease.    Limit alcohol to one drink per day.    No smoking.     Wear sunscreen to prevent skin cancer.     See your dentist every six months for an exam and cleaning.    See your eye doctor every 1 to 2  years.

## 2023-03-09 ENCOUNTER — TELEPHONE (OUTPATIENT)
Dept: NEUROLOGY | Facility: CLINIC | Age: 58
End: 2023-03-09
Payer: COMMERCIAL

## 2023-03-13 NOTE — TELEPHONE ENCOUNTER
Ap Arredondo     The PA was approved for Rachelle العلي for G43.719 for 4 vistis - 155 units every 12 weeks from 03.13.23. - 03.13.24., so this patient is good to go.     Thank you,     Yumi

## 2023-03-13 NOTE — PATIENT INSTRUCTIONS
Preparing for Your Surgery      Name:  Dinora Mcghee   MRN:  [id number]   :  1965   Today's Date:  2021       Arriving for surgery:  Surgery date:  21  Arrival time:  6:45AM    Restrictions due to COVID 19:       One visitor is allowed in the Pre Op area. When you go into surgery, one visitor is allowed to wait in the Surgery Waiting Room       (provided there is enough space to social distance).   After surgery- Two visitors are allowed at a time if you have a private room and one visitor is allowed for those in a semi-private room.   Every 4 days the visitor(s) can rotate. During the 4 day period, the visitor(s) must be consistent. No visitors under the age of 18 years old.   Visiting Hours: 8 am - 8:30 pm   No ill visitors.   All visitors must wear face mask.    [employer] parking is available for anyone with mobility limitations or disabilities.  ([facility]  24 hours/ 7 days a week; [facility]  7 am- 3:30 pm, Mon- Fri)    Please come to:     [facility] [address]  [address] [place] [address]    When entering the hospital you will be asked COVID screening questions, you will then be directed to Registration.  Registration will direct you to the 3rd floor Surgery waiting room. Please ask if you need an escort or a wheelchair to the Surgery Waiting Room.  Preop number- [phone]     What can I eat or drink?  -  You may eat and drink normally for up to 8 hours before your surgery. (Until Midnight)  -  You may have clear liquids until 2 hours before surgery. (Until 21, 6:45AM)    Examples of clear liquids:  Water  Clear broth  Juices (apple, white grape, white cranberry  and cider) without pulp  Noncarbonated, powder based beverages  (lemonade and Larry-Aid)  Sodas (Sprite, 7-Up, ginger ale and seltzer)  Coffee or tea (without milk or cream)  Gatorade    -  No Alcohol for at least 24 hours before surgery     Which medicines can  I take?    Hold Aspirin for 7 days before surgery.   Hold Multivitamins for 7 days before surgery.  Hold Supplements for 7 days before surgery.  Hold Ibuprofen (Advil, Motrin) for 1 day before surgery--unless otherwise directed by surgeon.  Hold Zolmitriptan(Zomig) for 24 hours prior to surgery.  Hold Naproxen (Aleve) for 4 days before surgery.    -  DO NOT take these medications the day of surgery:    Acarbosec(Precose)    Folic Acid    Miralax      Precalopride(Motegrity)    Senna    -  PLEASE TAKE these medications the day of surgery:    Astelin Nasal spray    Cetirizine(Zyrtec)    Diphenhydramine(Benadryl) as needed    Famotidine(Pepcid)    Levothyroxine    Prochlorperazine(Compazine) as needed    Scoplalamine Patch    How do I prepare myself?  - Please take 2 showers before surgery using Scrubcare or Hibiclens soap.    Use this soap only from the neck to your toes.     Leave the soap on your skin for one minute--then rinse thoroughly.      You may use your own shampoo and conditioner; no other hair products.   - Please remove all jewelry and body piercings.  - No lotions, deodorants or fragrance.  - No makeup or fingernail polish.   - Bring your ID and insurance card.    -If you have a Deep Brain Stimulator, Spinal Cord Stimulator or any neuro stimulator device---you must bring the remote control to the hospital     - All patients are required to have a Covid-19 test within 4 days of surgery/procedure.      -Patients will be contacted by the [facility] scheduling team within 1 week of surgery to make an appointment.      - Patients may call the Scheduling team at [phone] if they have not been scheduled within 4 days of  surgery.      ALL PATIENTS GOING HOME THE SAME DAY OF SURGERY ARE REQUIRED TO HAVE A RESPONSIBLE ADULT TO DRIVE AND BE IN ATTENDANCE WITH THEM FOR 24 HOURS FOLLOWING SURGERY.      Questions or Concerns:    - For any questions regarding the day of surgery or your hospital stay, please  contact the Pre Admission Nursing Office at [phone].       - If you have health changes between today and your surgery please call your surgeon.       For questions after surgery please call your surgeons office.              no

## 2023-03-16 ENCOUNTER — VIRTUAL VISIT (OUTPATIENT)
Dept: ONCOLOGY | Facility: CLINIC | Age: 58
End: 2023-03-16
Attending: SOCIAL WORKER
Payer: COMMERCIAL

## 2023-03-16 DIAGNOSIS — F43.23 ADJUSTMENT DISORDER WITH MIXED ANXIETY AND DEPRESSED MOOD: Primary | ICD-10-CM

## 2023-03-16 DIAGNOSIS — Z51.5 PALLIATIVE CARE PATIENT: ICD-10-CM

## 2023-03-16 PROCEDURE — 90834 PSYTX W PT 45 MINUTES: CPT | Mod: VID | Performed by: SOCIAL WORKER

## 2023-03-16 NOTE — LETTER
3/16/2023         RE: Dinora Mcghee  [address] [facility] [address]        Dear Colleague,    Thank you for referring your patient, Dinora Mcghee, to the [facility] [place]. Please see a copy of my visit note below.    Telemedicine Visit: The patient's condition can be safely assessed and treated via synchronous audio and visual telemedicine encounter.      Reason for Telemedicine Visit: [email or web address]     Originating Site (Patient Location): Patient's home      Distant Location (provider location):  On-site    Consent:  The patient/guardian has verbally consented to: the potential risks and benefits of telemedicine (video visit) versus in person care; bill my insurance or make self-payment for services provided; and responsibility for payment of non-covered services.     Mode of Communication:  Video Conference via [email or web address]    As the provider I attest to compliance with applicable laws and regulations related to telemedicine.    Palliative Care Clinical Social Work Return Appointment    PLEASE NOTE:  THIS IS A MENTAL HEALTH NOTE.  OTHER PROVIDERS VIEWING THIS NOTE SHOULD USE THIS ONLY FOR UNDERSTANDING THE CONTEXT OF THE PATIENT'S EXPERIENCE.  TOPICS DESCRIBED IN THIS NOTE SHOULD NOT BE REFERENCED TO THE PATIENT BY MEDICAL PROVIDERS.    Dinora Mcghee is a 57  year old woman with multiple medical conditisions including chronic pancreatitis s/p TPAIT, long term complications from gastroparesis, constipation, GERD, migraines.  Had GJ placed Sept 2021, recovery was complicated and she ended up with a venting g-tube.  Due to complications with PO tolerance she had surgical placement of a J tube, and a resection of densely adhered small bowel.  Now with tube feedings.  She was seen for initial pallitive care clinical assessment on 2/20/2023 by VASQUEZ Mariscal. Referred to me to explore interest in meaning centered psychotherapy.   Mental Status Exam:(List all that apply)      Appearance:  "Appropriate      Eye Contact: Good       Orientation: Yes, x4      Mood: Normal      Affect: Appropriate      Thought Content: Clear         Thought Form: Logical      Psychomotor Behavior: Normal    Visit themes: Information on MCP, establishing rapport    Adjustment to Illness: Spent much of today's visit providing information about Meaning Centered Psychotherapy and it's key components and getting Dinora's illness narrative.     Mental Health (thoughts, feelings, actions, coping, and symptoms): Tearful, most recent major surgery was about 6 weeks ago so she is still recovering mentally and physically. \"I know I have to accept this, but this really sucks\"     Helpful activities: time with family and friends     Helpful cognitions:     Unhelpful and/or triggering or exacerbating factors:     Body-Mind Skills Education:     Relationships:     End of Life and Advance Care Planning:     Main therapeutic interventions provided this session include:  Meaning Centered Psychotherapy.  MC    Plan:  Will return for psychotherapy with palliative care focus -- have asked schedulers to reach out to arrange follow upappointments    Time spent with patient/family:  50 minutes  (Start 3:30, end 4:20)    JAIDA Nguyen, [facility]   Palliative Care Clinical   Ph: [phone]      DO NOT SEND ANY LETTERS                    Again, thank you for allowing me to participate in the care of your patient.        Sincerely,        VASQUEZ Nguyen    "

## 2023-03-16 NOTE — PROGRESS NOTES
Telemedicine Visit: The patient's condition can be safely assessed and treated via synchronous audio and visual telemedicine encounter.      Reason for Telemedicine Visit: [email or web address]     Originating Site (Patient Location): Patient's home      Distant Location (provider location):  On-site    Consent:  The patient/guardian has verbally consented to: the potential risks and benefits of telemedicine (video visit) versus in person care; bill my insurance or make self-payment for services provided; and responsibility for payment of non-covered services.     Mode of Communication:  Video Conference via [employer]    As the provider I attest to compliance with applicable laws and regulations related to telemedicine.    Palliative Care Clinical Social Work Return Appointment    PLEASE NOTE:  THIS IS A MENTAL HEALTH NOTE.  OTHER PROVIDERS VIEWING THIS NOTE SHOULD USE THIS ONLY FOR UNDERSTANDING THE CONTEXT OF THE PATIENT'S EXPERIENCE.  TOPICS DESCRIBED IN THIS NOTE SHOULD NOT BE REFERENCED TO THE PATIENT BY MEDICAL PROVIDERS.    Dinora Mcghee is a 57  year old woman with multiple medical conditisions including chronic pancreatitis s/p TPAIT, long term complications from gastroparesis, constipation, GERD, migraines.  Had GJ placed Sept 2021, recovery was complicated and she ended up with a venting g-tube.  Due to complications with PO tolerance she had surgical placement of a J tube, and a resection of densely adhered small bowel.  Now with tube feedings.  She was seen for initial pallitive care clinical assessment on 2/20/2023 by VASQUEZ Mariscal for adjustment disorder with anxiety and depressed mood. Referred to me to explore interest in meaning centered psychotherapy.   Mental Status Exam:(List all that apply)      Appearance: Appropriate      Eye Contact: Good       Orientation: Yes, x4      Mood: Normal      Affect: Appropriate      Thought Content: Clear         Thought Form: Logical      Psychomotor Behavior:  "Normal    Visit themes: Information on MCP, establishing rapport    Adjustment to Illness: Spent much of today's visit providing information about Meaning Centered Psychotherapy and it's key components and getting Dinora's illness narrative.     Mental Health (thoughts, feelings, actions, coping, and symptoms): Tearful, most recent major surgery was about 6 weeks ago so she is still recovering mentally and physically. \"I know I have to accept this, but this really sucks\"     Helpful activities: time with family and friends     Helpful cognitions:     Unhelpful and/or triggering or exacerbating factors:     Body-Mind Skills Education:     Relationships:     End of Life and Advance Care Planning:     Main therapeutic interventions provided this session include:  Meaning Centered Psychotherapy.      Plan:  Will return for psychotherapy with palliative care focus -- have asked schedulers to reach out to arrange follow upappointments    Time spent with patient/family:  50 minutes  (Start 3:30, end 4:20)    JAIDA Nguyen, [facility]   Palliative Care Clinical   Ph: [phone]      DO NOT SEND ANY LETTERS                "

## 2023-03-16 NOTE — LETTER
3/16/2023         RE: Dinora Mcghee  [address] [facility] [address]        Dear Colleague,    Thank you for referring your patient, Dinora Mcghee, to the [facility] [place]. Please see a copy of my visit note below.    Telemedicine Visit: The patient's condition can be safely assessed and treated via synchronous audio and visual telemedicine encounter.      Reason for Telemedicine Visit: [email or web address]     Originating Site (Patient Location): Patient's home      Distant Location (provider location):  On-site    Consent:  The patient/guardian has verbally consented to: the potential risks and benefits of telemedicine (video visit) versus in person care; bill my insurance or make self-payment for services provided; and responsibility for payment of non-covered services.     Mode of Communication:  Video Conference via [employer]    As the provider I attest to compliance with applicable laws and regulations related to telemedicine.    Palliative Care Clinical Social Work Return Appointment    PLEASE NOTE:  THIS IS A MENTAL HEALTH NOTE.  OTHER PROVIDERS VIEWING THIS NOTE SHOULD USE THIS ONLY FOR UNDERSTANDING THE CONTEXT OF THE PATIENT'S EXPERIENCE.  TOPICS DESCRIBED IN THIS NOTE SHOULD NOT BE REFERENCED TO THE PATIENT BY MEDICAL PROVIDERS.    Dinora Mcghee is a 57  year old woman with multiple medical conditisions including chronic pancreatitis s/p TPAIT, long term complications from gastroparesis, constipation, GERD, migraines.  Had GJ placed Sept 2021, recovery was complicated and she ended up with a venting g-tube.  Due to complications with PO tolerance she had surgical placement of a J tube, and a resection of densely adhered small bowel.  Now with tube feedings.  She was seen for initial pallitive care clinical assessment on 2/20/2023 by VASQUEZ Mariscal. Referred to me to explore interest in meaning centered psychotherapy.   Mental Status Exam:(List all that apply)      Appearance:  "Appropriate      Eye Contact: Good       Orientation: Yes, x4      Mood: Normal      Affect: Appropriate      Thought Content: Clear         Thought Form: Logical      Psychomotor Behavior: Normal    Visit themes: Information on MCP, establishing rapport    Adjustment to Illness: Spent much of today's visit providing information about Meaning Centered Psychotherapy and it's key components and getting Dinora's illness narrative.     Mental Health (thoughts, feelings, actions, coping, and symptoms): Tearful, most recent major surgery was about 6 weeks ago so she is still recovering mentally and physically. \"I know I have to accept this, but this really sucks\"     Helpful activities: time with family and friends     Helpful cognitions:     Unhelpful and/or triggering or exacerbating factors:     Body-Mind Skills Education:     Relationships:     End of Life and Advance Care Planning:     Main therapeutic interventions provided this session include:  Meaning Centered Psychotherapy.  MC    Plan:  Will return for psychotherapy with palliative care focus -- have asked schedulers to reach out to arrange follow upappointments    Time spent with patient/family:  50 minutes  (Start 3:30, end 4:20)    JAIDA Nguyen, [facility]   Palliative Care Clinical   Ph: [phone]      DO NOT SEND ANY LETTERS                    Again, thank you for allowing me to participate in the care of your patient.        Sincerely,        VASQUEZ Nguyen    "

## 2023-03-20 ENCOUNTER — TELEPHONE (OUTPATIENT)
Dept: SURGERY | Facility: CLINIC | Age: 58
End: 2023-03-20

## 2023-03-20 ASSESSMENT — HEADACHE IMPACT TEST (HIT 6)
HOW OFTEN HAVE YOU FELT TOO TIRED TO WORK BECAUSE OF YOUR HEADACHES: SOMETIMES
WHEN YOU HAVE HEADACHES HOW OFTEN IS THE PAIN SEVERE: VERY OFTEN
WHEN YOU HAVE A HEADACHE HOW OFTEN DO YOU WISH YOU COULD LIE DOWN: VERY OFTEN
HIT6 TOTAL SCORE: 62
HOW OFTEN DID HEADACHS LIMIT CONCENTRATION ON WORK OR DAILY ACTIVITY: SOMETIMES
HOW OFTEN HAVE YOU FELT FED UP OR IRRITATED BECAUSE OF YOUR HEADACHES: SOMETIMES
HOW OFTEN DO HEADACHES LIMIT YOUR DAILY ACTIVITIES: SOMETIMES

## 2023-03-23 ENCOUNTER — OFFICE VISIT (OUTPATIENT)
Dept: NEUROLOGY | Facility: CLINIC | Age: 58
End: 2023-03-23
Payer: COMMERCIAL

## 2023-03-23 DIAGNOSIS — G43.709 CHRONIC MIGRAINE W/O AURA W/O STATUS MIGRAINOSUS, NOT INTRACTABLE: Primary | ICD-10-CM

## 2023-03-23 PROCEDURE — 64615 CHEMODENERV MUSC MIGRAINE: CPT | Performed by: NURSE PRACTITIONER

## 2023-03-23 NOTE — LETTER
3/23/2023       RE: Dinora Mcghee  [address] [facility] [address]     Dear Colleague,    Thank you for referring your patient, Dinora Mcghee, to the [facility] [place] at [facility]. Please see a copy of my visit note below.    BOTULINUM NEUROTOXIN INJECTION PROCEDURES:  DATA:03/23/23  INDICATIONS FOR PROCEDURES:   chronic migraine headaches. The patient  is improving overall in her migraine management and continues to be a good candidate for ongoing Botox.  baseline symptoms have been recalcitrant to oral medications and conservative therapy. Patient is here today for a repeat injection with Botox. No problems reported since last Botox on 12/19/2022. Less need for rescue treatment and 2 severe migraines since last session. Botox appear to be effective 50% or more.       GOAL OF PROCEDURE:  The goal of this procedure is to decrease pain and enhance functional independence.     TOTAL DOSE ADMINISTERED:  Dose Administered:  155 units  Botox (Botulinum Toxin Type A)       2:1 Dilution    Wasted 45 units     CONSENT:  The risks, benefits, and treatment options were discussed with the patient who agreed to proceed.     Written consent was obtained      EQUIPMENT USED:  Needles-30 gauge, 0.5 inches for injections  Four 1-ml tuberculin syringes for injections  One sodium chloride 10 ml vial preservative free  Alcohol swabs     SKIN PREPARATION:  Skin preparation was performed using an alcohol wipe.        AREA/MUSCLE INJECTED:  155 units of Botox     Right upper Trapezius (upper cervical) - 5 units of Botox at 3 site/s.   Left upper Trapezius (upper cervical) - 5 units of Botox at 3 site/s.      Right cervical paraspinals - 5 units of Botox at 2 site/s.   Left cervical paraspinals - 5 units of Botox at 2 site/s.      Left occipitalis - 5 units of Botox at 3 site/s.  Right occipitalis -5 units of Botox at 3 site/s        Right Frontalis - 5  units of Botox at 2 site/s.  Left Frontalis - 5 units of Botox at 2 site/s.     Right Temporalis - 5 units of Botox at 4 site/s.  Left Temporalis - 5 units of Botox at 4 site/s.     Right  - 5 units of Botox at 1 site/s.              Left  - 5 units of Botox at 1 site/s.     Procerus - 5 units of Botox at 1 site/s.     RESPONSE TO PROCEDURE:  tolerated the procedure well and there were no immediate complications.  Patient was allowed to recover for an appropriate period of time and was discharged home in stable condition.     FOLLOW UP:     Repeat Botox injections in 12 weeks        This procedure was performed under a hospital privileging agreement with NATALY Bonner, [facility]

## 2023-03-23 NOTE — PROGRESS NOTES
BOTULINUM NEUROTOXIN INJECTION PROCEDURES:  DATA:03/23/23  INDICATIONS FOR PROCEDURES:   chronic migraine headaches. The patient  is improving overall in her migraine management and continues to be a good candidate for ongoing Botox.  baseline symptoms have been recalcitrant to oral medications and conservative therapy. Patient is here today for a repeat injection with Botox. No problems reported since last Botox on 12/19/2022. Less need for rescue treatment and 2 severe migraines since last session. Botox appear to be effective 50% or more.       GOAL OF PROCEDURE:  The goal of this procedure is to decrease pain and enhance functional independence.     TOTAL DOSE ADMINISTERED:  Dose Administered:  155 units  Botox (Botulinum Toxin Type A)       2:1 Dilution    Wasted 45 units     CONSENT:  The risks, benefits, and treatment options were discussed with the patient who agreed to proceed.     Written consent was obtained      EQUIPMENT USED:  Needles-30 gauge, 0.5 inches for injections  Four 1-ml tuberculin syringes for injections  One sodium chloride 10 ml vial preservative free  Alcohol swabs     SKIN PREPARATION:  Skin preparation was performed using an alcohol wipe.        AREA/MUSCLE INJECTED:  155 units of Botox     Right upper Trapezius (upper cervical) - 5 units of Botox at 3 site/s.   Left upper Trapezius (upper cervical) - 5 units of Botox at 3 site/s.      Right cervical paraspinals - 5 units of Botox at 2 site/s.   Left cervical paraspinals - 5 units of Botox at 2 site/s.      Left occipitalis - 5 units of Botox at 3 site/s.  Right occipitalis -5 units of Botox at 3 site/s        Right Frontalis - 5 units of Botox at 2 site/s.  Left Frontalis - 5 units of Botox at 2 site/s.     Right Temporalis - 5 units of Botox at 4 site/s.  Left Temporalis - 5 units of Botox at 4 site/s.     Right  - 5 units of Botox at 1 site/s.              Left  - 5 units of Botox at 1 site/s.     Procerus - 5  units of Botox at 1 site/s.     RESPONSE TO PROCEDURE:  tolerated the procedure well and there were no immediate complications.  Patient was allowed to recover for an appropriate period of time and was discharged home in stable condition.     FOLLOW UP:     Repeat Botox injections in 12 weeks        This procedure was performed under a hospital privileging agreement with NATALY Bonner, [facility]

## 2023-03-29 ASSESSMENT — ENCOUNTER SYMPTOMS
INSOMNIA: 1
DEPRESSION: 1
PANIC: 0
TINGLING: 0
SEIZURES: 0
DECREASED CONCENTRATION: 1
BLOATING: 1
SKIN CHANGES: 0
BOWEL INCONTINENCE: 0
POOR WOUND HEALING: 0
NERVOUS/ANXIOUS: 1
NAIL CHANGES: 0
RECTAL PAIN: 0
CONSTIPATION: 0
DISTURBANCES IN COORDINATION: 0
NUMBNESS: 0
DIARRHEA: 1
DIZZINESS: 0
TREMORS: 0
SPEECH CHANGE: 0
HEADACHES: 1
HEARTBURN: 1
BLOOD IN STOOL: 0
VOMITING: 1
NAUSEA: 1
WEAKNESS: 0
JAUNDICE: 0
ABDOMINAL PAIN: 1
PARALYSIS: 0
LOSS OF CONSCIOUSNESS: 0
MEMORY LOSS: 0

## 2023-03-29 NOTE — NURSING NOTE
"Chief Complaint   Patient presents with     Follow Up       Vitals:    12/18/20 1347   Weight: 69.9 kg (154 lb)   Height: 1.702 m (5' 7\")       Body mass index is 24.12 kg/m .    Saundra Jaquez CMA    " no

## 2023-03-30 ENCOUNTER — OFFICE VISIT (OUTPATIENT)
Dept: SURGERY | Facility: CLINIC | Age: 58
End: 2023-03-30
Payer: COMMERCIAL

## 2023-03-30 VITALS
OXYGEN SATURATION: 100 % | HEIGHT: 68 IN | SYSTOLIC BLOOD PRESSURE: 121 MMHG | DIASTOLIC BLOOD PRESSURE: 84 MMHG | HEART RATE: 111 BPM | WEIGHT: 134 LBS | TEMPERATURE: 97.8 F | BODY MASS INDEX: 20.31 KG/M2

## 2023-03-30 DIAGNOSIS — Z98.890 POSTOPERATIVE STATE: Primary | ICD-10-CM

## 2023-03-30 PROCEDURE — 99024 POSTOP FOLLOW-UP VISIT: CPT | Performed by: SURGERY

## 2023-03-30 ASSESSMENT — PAIN SCALES - GENERAL: PAINLEVEL: MODERATE PAIN (4)

## 2023-03-30 NOTE — PROGRESS NOTES
See dictated note: [id number]  Visit time 30 min  GB  Answers for HPI/ROS submitted by the patient on 3/29/2023  General Symptoms: No  Skin Symptoms: Yes  HENT Symptoms: No  EYE SYMPTOMS: No  HEART SYMPTOMS: No  LUNG SYMPTOMS: No  INTESTINAL SYMPTOMS: Yes  URINARY SYMPTOMS: No  GYNECOLOGIC SYMPTOMS: No  BREAST SYMPTOMS: No  SKELETAL SYMPTOMS: No  BLOOD SYMPTOMS: No  NERVOUS SYSTEM SYMPTOMS: Yes  MENTAL HEALTH SYMPTOMS: Yes  Changes in hair: No  Changes in moles/birth marks: No  Itching: Yes  Rashes: Yes  Changes in nails: No  Acne: No  Hair in places you don't want it: No  Change in facial hair: No  Warts: No  Non-healing sores: No  Scarring: No  Flaking of skin: No  Color changes of hands/feet in cold : No  Sun sensitivity: No  Skin thickening: No  Heart burn or indigestion: Yes  Nausea: Yes  Vomiting: Yes  Abdominal pain: Yes  Bloating: Yes  Constipation: No  Diarrhea: Yes  Blood in stool: No  Black stools: No  Rectal or Anal pain: No  Fecal incontinence: No  Yellowing of skin or eyes: No  Vomit with blood: No  Change in stools: No  Trouble with coordination: No  Dizziness or trouble with balance: No  Fainting or black-out spells: No  Memory loss: No  Headache: Yes  Seizures: No  Speech problems: No  Tingling: No  Tremor: No  Weakness: No  Difficulty walking: No  Paralysis: No  Numbness: No  Nervous or Anxious: Yes  Depression: Yes  Trouble sleeping: Yes  Trouble thinking or concentrating: Yes  Mood changes: No  Panic attacks: No

## 2023-03-30 NOTE — LETTER
3/30/2023       RE: Dinora Mcghee  [address] [facility] [address]       Dear Colleague,    Thank you for referring your patient, Dinora Mcghee, to the [facility] [place] at [facility]. Please see a copy of my visit note below.    See dictated note: [id number]  Visit time 30 min  GB  Answers for HPI/ROS submitted by the patient on 3/29/2023  General Symptoms: No  Skin Symptoms: Yes  HENT Symptoms: No  EYE SYMPTOMS: No  HEART SYMPTOMS: No  LUNG SYMPTOMS: No  INTESTINAL SYMPTOMS: Yes  URINARY SYMPTOMS: No  GYNECOLOGIC SYMPTOMS: No  BREAST SYMPTOMS: No  SKELETAL SYMPTOMS: No  BLOOD SYMPTOMS: No  NERVOUS SYSTEM SYMPTOMS: Yes  MENTAL HEALTH SYMPTOMS: Yes  Changes in hair: No  Changes in moles/birth marks: No  Itching: Yes  Rashes: Yes  Changes in nails: No  Acne: No  Hair in places you don't want it: No  Change in facial hair: No  Warts: No  Non-healing sores: No  Scarring: No  Flaking of skin: No  Color changes of hands/feet in cold : No  Sun sensitivity: No  Skin thickening: No  Heart burn or indigestion: Yes  Nausea: Yes  Vomiting: Yes  Abdominal pain: Yes  Bloating: Yes  Constipation: No  Diarrhea: Yes  Blood in stool: No  Black stools: No  Rectal or Anal pain: No  Fecal incontinence: No  Yellowing of skin or eyes: No  Vomit with blood: No  Change in stools: No  Trouble with coordination: No  Dizziness or trouble with balance: No  Fainting or black-out spells: No  Memory loss: No  Headache: Yes  Seizures: No  Speech problems: No  Tingling: No  Tremor: No  Weakness: No  Difficulty walking: No  Paralysis: No  Numbness: No  Nervous or Anxious: Yes  Depression: Yes  Trouble sleeping: Yes  Trouble thinking or concentrating: Yes  Mood changes: No  Panic attacks: No    Again, thank you for allowing me to participate in the care of your patient.      Sincerely,    Elver Bauer MD

## 2023-03-30 NOTE — NURSING NOTE
"Chief Complaint   Patient presents with     EDWARDO Farias, is being seen today for a follow up.       Vitals:    03/30/23 0938   BP: 121/84   BP Location: Left arm   Patient Position: Chair   Cuff Size: Adult Regular   Pulse: 111   Temp: 97.8  F (36.6  C)   TempSrc: Oral   SpO2: 100%   Weight: 60.8 kg (134 lb)   Height: 1.727 m (5' 7.98\")       Body mass index is 20.39 kg/m .      Antoinette Tolentino LPN    "

## 2023-03-30 NOTE — PROGRESS NOTES
Service Date: 2023    Ms. Loo is here for a postop visit after placement of a jejunostomy tube.  She is doing quite well.  Her major complaint today is that the tube is slipping back from the skin level ring in her GJ tube.  We looked at this together and I elected to use some superglue to help stick the internal portion of the tube to the ring.  Repeat dressing was placed.  Another issue that came up is the difficulty in sleeping related to the beeps and noises of her feeding tube device.  I am wondering whether or not we can cycle her tube feeds to a certain extent.  We will discuss her at next week's chronic pancreatitis working group meeting with Dr. Park to see if he is agreeable.  I think that we could increase her from 45 an hour to 65 an hour for 16 hours a day and see if her weight continued to be maintained and to give her 8 hours of freedom.  Overall, Ms. Loo appears to be doing well.  I would like to see her again as needed in the future.    Elver Bauer MD        D: 2023   T: 2023   MT: carleen    Name:     MALINI DAVIS  MRN:      [id number]        Account:      [id number]   :      1965           Service Date: 2023       Document: [id number]

## 2023-04-03 DIAGNOSIS — G43.709 CHRONIC MIGRAINE W/O AURA W/O STATUS MIGRAINOSUS, NOT INTRACTABLE: Primary | ICD-10-CM

## 2023-04-05 DIAGNOSIS — G43.709 CHRONIC MIGRAINE W/O AURA W/O STATUS MIGRAINOSUS, NOT INTRACTABLE: ICD-10-CM

## 2023-04-05 DIAGNOSIS — G43.719 INTRACTABLE CHRONIC MIGRAINE WITHOUT AURA AND WITHOUT STATUS MIGRAINOSUS: Primary | ICD-10-CM

## 2023-04-10 ENCOUNTER — PREP FOR PROCEDURE (OUTPATIENT)
Dept: GASTROENTEROLOGY | Facility: CLINIC | Age: 58
End: 2023-04-10
Payer: COMMERCIAL

## 2023-04-10 ENCOUNTER — TELEPHONE (OUTPATIENT)
Dept: GASTROENTEROLOGY | Facility: CLINIC | Age: 58
End: 2023-04-10
Payer: COMMERCIAL

## 2023-04-10 ENCOUNTER — PATIENT OUTREACH (OUTPATIENT)
Dept: GASTROENTEROLOGY | Facility: CLINIC | Age: 58
End: 2023-04-10
Payer: COMMERCIAL

## 2023-04-10 DIAGNOSIS — Z46.59 ENCOUNTER FOR CARE RELATED TO FEEDING TUBE: Primary | ICD-10-CM

## 2023-04-10 NOTE — PROGRESS NOTES
Per Endoscopy:  Tomorrow 4/11/23 at 2 in Room 04.     Called to discuss with patient  Arrival at 1pm  Needs   NPO 8 hours prior to arrival    Left message, Fransisco sent    DIEGO

## 2023-04-10 NOTE — TELEPHONE ENCOUNTER
Pt's jtube came out during a road trip, has red rubber tube in the tract now.     Please assist in scheduling:     Procedure/Imaging/Clinic: jtube exchange  Physician: Dr. Park  [id number]  Procedure length:provider average  Anesthesia:gen  Dx: encounter related to feeding tube  Tier:2  Location: [facility]       Orders placed, message sent to Dr. Park to determine plan for OR vs [facility] tomorrow to address tube. Advised pt to rest gut, not feed for now.    ML

## 2023-04-10 NOTE — TELEPHONE ENCOUNTER
Case created.  DOS 04/11 at 2pm    Confirm with Kenya if she needs us to reach out to patient.          Staff Message:    ----- Message from Kenya Victor RN sent at 4/10/2023  9:49 AM CDT -----  Regarding: Room 4 or 5 in [facility] tomorrow 4/11  Hi team    This pt needs an ASAP tube exchange tomorrow in [facility], needs fluoro so room 4 or 5 is needed.  Can do between Dr. Park's OR times, following times are OK    9:40am  11:45am  2pm  4:20 pm    Please let me know if any of the above times work- thanks!    Kenya

## 2023-04-10 NOTE — TELEPHONE ENCOUNTER
Per Dr. Park:    Ok for tube exchange tomorrow in [facility] with fluro and moderate station.    Orders placed, message sent to team    ML

## 2023-04-11 ENCOUNTER — APPOINTMENT (OUTPATIENT)
Dept: GENERAL RADIOLOGY | Facility: CLINIC | Age: 58
End: 2023-04-11
Attending: INTERNAL MEDICINE
Payer: COMMERCIAL

## 2023-04-11 ENCOUNTER — HOSPITAL ENCOUNTER (OUTPATIENT)
Facility: CLINIC | Age: 58
Discharge: HOME | End: 2023-04-11
Attending: INTERNAL MEDICINE | Admitting: INTERNAL MEDICINE
Payer: COMMERCIAL

## 2023-04-11 VITALS
DIASTOLIC BLOOD PRESSURE: 80 MMHG | HEART RATE: 82 BPM | OXYGEN SATURATION: 100 % | RESPIRATION RATE: 14 BRPM | SYSTOLIC BLOOD PRESSURE: 116 MMHG

## 2023-04-11 DIAGNOSIS — K94.23 GASTROSTOMY TUBE OBSTRUCTION (H): ICD-10-CM

## 2023-04-11 DIAGNOSIS — R10.84 ABDOMINAL PAIN, GENERALIZED: Primary | ICD-10-CM

## 2023-04-11 LAB
GLUCOSE BLDC GLUCOMTR-MCNC: 87 MG/DL (ref 70–99)
PROVATION GI EXAM: NORMAL

## 2023-04-11 PROCEDURE — 82962 GLUCOSE BLOOD TEST: CPT

## 2023-04-11 PROCEDURE — G0500 MOD SEDAT ENDO SERVICE >5YRS: HCPCS | Performed by: INTERNAL MEDICINE

## 2023-04-11 PROCEDURE — 258N000003 HC RX IP 258 OP 636: Performed by: INTERNAL MEDICINE

## 2023-04-11 PROCEDURE — 99153 MOD SED SAME PHYS/QHP EA: CPT | Performed by: INTERNAL MEDICINE

## 2023-04-11 PROCEDURE — 76000 FLUOROSCOPY <1 HR PHYS/QHP: CPT | Mod: TC

## 2023-04-11 PROCEDURE — 49441 PLACE DUOD/JEJ TUBE PERC: CPT | Performed by: INTERNAL MEDICINE

## 2023-04-11 PROCEDURE — 43762 RPLC GTUBE NO REVJ TRC: CPT | Performed by: INTERNAL MEDICINE

## 2023-04-11 PROCEDURE — 250N000011 HC RX IP 250 OP 636: Performed by: INTERNAL MEDICINE

## 2023-04-11 RX ORDER — HEPARIN SODIUM (PORCINE) LOCK FLUSH IV SOLN 100 UNIT/ML 100 UNIT/ML
5-10 SOLUTION INTRAVENOUS
Status: DISCONTINUED | OUTPATIENT
Start: 2023-04-11 | End: 2023-04-11 | Stop reason: HOSPADM

## 2023-04-11 RX ORDER — LIDOCAINE 40 MG/G
CREAM TOPICAL
Status: DISCONTINUED | OUTPATIENT
Start: 2023-04-11 | End: 2023-04-11 | Stop reason: HOSPADM

## 2023-04-11 RX ORDER — ONDANSETRON 2 MG/ML
4 INJECTION INTRAMUSCULAR; INTRAVENOUS
Status: DISCONTINUED | OUTPATIENT
Start: 2023-04-11 | End: 2023-04-11 | Stop reason: HOSPADM

## 2023-04-11 RX ORDER — OXYCODONE HCL 5 MG/5 ML
5 SOLUTION, ORAL ORAL EVERY 6 HOURS PRN
Qty: 60 ML | Refills: 0 | Status: SHIPPED | OUTPATIENT
Start: 2023-04-11 | End: 2023-04-14

## 2023-04-11 RX ORDER — FENTANYL CITRATE 50 UG/ML
INJECTION, SOLUTION INTRAMUSCULAR; INTRAVENOUS PRN
Status: DISCONTINUED | OUTPATIENT
Start: 2023-04-11 | End: 2023-04-11 | Stop reason: HOSPADM

## 2023-04-11 RX ADMIN — PROMETHAZINE HYDROCHLORIDE 12.5 MG: 25 INJECTION INTRAMUSCULAR; INTRAVENOUS at 15:10

## 2023-04-11 RX ADMIN — HEPARIN SODIUM (PORCINE) LOCK FLUSH IV SOLN 100 UNIT/ML 5 ML: 100 SOLUTION at 16:44

## 2023-04-11 ASSESSMENT — ACTIVITIES OF DAILY LIVING (ADL)
ADLS_ACUITY_SCORE: 37
ADLS_ACUITY_SCORE: 37

## 2023-04-11 NOTE — OR NURSING
Pt underwent gtube and jtube replacement under conscious sedation. Specimens: none. Pt transferred to recovery and report given to endo RN.       Maricarmen Todd RN

## 2023-04-11 NOTE — DISCHARGE INSTRUCTIONS
Your Gastro-Jejunum (G-J) Tube  You are going home with a gastro-jejunum tube (G-J tube) in place. The G-J tube is put through the abdominal wall into your stomach. It extends into the jejunum, a part of the small intestine.   The G-J tube sends liquid food directly to your stomach or small intestine. You have been given this tube specifically to get liquid food into your small intestine. The G-J tube gives you the nutrition you need.   What to know  There are many types of G-J tubes, syringes, and feeding pumps. Your tube and supplies may look or work differently from what are described and shown here. Follow the instructions from your healthcare provider or home health nurse.   Ask for phone numbers to call if you need help. Also make sure you have the phone number for your medical supply company. You ll need to order more supplies in the future. Write all these phone numbers below.   Healthcare provider s phone number:   Home health nurse s phone number:   Medical supply company s phone number:   Feeding with a G-J tube  You ll need to feed yourself through the tube. Your healthcare provider or home health nurse will show you how to do it. You will also learn how to vent air when needed and how to check the balloon if your tube uses a balloon anchor system. If you need more help, talk with your healthcare provider or home health nurse.   You have 2 ports at the end of your tube. One is the gastric port (G-port) that goes directly to your stomach. The other one is the jejunal port (J-port). Make sure you know which one is the J-port. Only use the J-port for liquid food. You may also put liquid medicine in it, if needed. But never put crushed medicine into the J-port.   Ask your healthcare provider if taking medicine by mouth with a sip of water is preferred. If you are not able to take medicine by mouth for any reason, you can put crushed medicine through the G-port using the correct technique.   Types of  feeding  There are 3 types of feeding with a G-J tube. You may have one or a combination of these. They are:   Continuous feeding. This type of feeding is recommended when feeding into the J-port. Liquid food is dripped slowly through the tube for part or all of a day. Continuous feeding is only done using a pump. The amount of food to be given and the time frame are often set on the pump for you. Don't change pump settings unless you re told to do so.  Bolus feeding. This is a meal-sized amount of liquid food given through the tube several times a day. Bolus feeding is given using a syringe or a pump. Bolus feeding is done into the stomach (G-port) but not into the jejunum (J-port). Your healthcare provider or home health nurse will tell you how much liquid food to use for each feeding. You ll also be told how often to feed yourself.  Gravity feeding. A set amount of formula is placed in a feeding bag. It's allowed to flow slowly through the tube for at least 30 minutes per feeding.  Flushing the G-J tube  Your healthcare team will show you how to flush the tube. Make sure you flush the tube regularly as instructed to keep it from being clogged. Also make sure you know what not to put in the tube, such as whole pills. If you need help, talk with your healthcare provider or home health nurse.   When to call your healthcare provider  Call your healthcare provider right away if any of the following occur:  You have trouble breathing  The tube feels loose or comes out  The opening where the tube enters the skin becomes larger  Red, rough tissue forms around the tube site  The tube becomes clogged or blocked and you can't clear it  The skin around the tube site has redness, swelling, leaking fluid, or sores  You see blood around the tube, in your stool, or in the stomach contents  You cough, choke, or vomit while feeding  Your belly looks bloated or feels hard when gently pressed  You have diarrhea or constipation  You  have a fever of 100.4 F (38 C) or higher, or as directed by the provider  Jose Carlos last reviewed this educational content on 1/1/2020 2000-2022 [employer] All rights reserved. This information is not intended as a substitute for professional medical care. Always follow your healthcare professional's instructions.

## 2023-04-13 ENCOUNTER — MYC MEDICAL ADVICE (OUTPATIENT)
Dept: GASTROENTEROLOGY | Facility: CLINIC | Age: 58
End: 2023-04-13
Payer: COMMERCIAL

## 2023-04-13 NOTE — TELEPHONE ENCOUNTER
"Called [facility] regarding popped Gtube    Per patient, she opened a window, felt pain, balloon popped    \"3cm 18 Fr tube placed and put 6ml in and it feels ok. \"    Pt has 2 boxes left of low profile tubes, may consider mushroom tube if this keeps happening.     At TF at 30ml/hr, when she advances to 35ml/hr, she has diarrhea. Encouraged patient to call [facility] to discuss different feed options to help with diarrhea and or increase calories- pt is on same feed.     Encouraed patient to call with additional questions/concerns.    Kenya Victor, RN Care Coordinator      "

## 2023-04-15 ENCOUNTER — HEALTH MAINTENANCE LETTER (OUTPATIENT)
Age: 58
End: 2023-04-15

## 2023-04-17 DIAGNOSIS — K31.84 GASTROPARESIS: ICD-10-CM

## 2023-04-17 ASSESSMENT — ENCOUNTER SYMPTOMS
WEIGHT GAIN: 0
BLOOD IN STOOL: 0
DISTURBANCES IN COORDINATION: 0
NAUSEA: 1
TREMORS: 0
SMELL DISTURBANCE: 0
NUMBNESS: 0
ABDOMINAL PAIN: 1
HEARTBURN: 1
INCREASED ENERGY: 1
WEIGHT LOSS: 1
POLYPHAGIA: 0
EYE WATERING: 1
SEIZURES: 0
BOWEL INCONTINENCE: 0
ALTERED TEMPERATURE REGULATION: 0
DEPRESSION: 1
HALLUCINATIONS: 0
TASTE DISTURBANCE: 0
SKIN CHANGES: 0
NAIL CHANGES: 1
PANIC: 0
HEADACHES: 1
DECREASED CONCENTRATION: 0
SPEECH CHANGE: 0
SINUS PAIN: 0
RECTAL PAIN: 0
POOR WOUND HEALING: 0
DECREASED APPETITE: 1
NERVOUS/ANXIOUS: 1
LOSS OF CONSCIOUSNESS: 0
SORE THROAT: 0
DIZZINESS: 0
EYE IRRITATION: 1
VOMITING: 1
DOUBLE VISION: 0
NIGHT SWEATS: 0
EYE PAIN: 0
WEAKNESS: 0
PARALYSIS: 0
INSOMNIA: 1
EYE REDNESS: 1
POLYDIPSIA: 0
CONSTIPATION: 0
HOARSE VOICE: 1
DIARRHEA: 1
CHILLS: 0
FATIGUE: 1
TINGLING: 0
FEVER: 0
JAUNDICE: 0
NECK MASS: 0
MEMORY LOSS: 0
BLOATING: 1
TROUBLE SWALLOWING: 0
SINUS CONGESTION: 0

## 2023-04-18 ENCOUNTER — PATIENT OUTREACH (OUTPATIENT)
Dept: GASTROENTEROLOGY | Facility: CLINIC | Age: 58
End: 2023-04-18

## 2023-04-18 ENCOUNTER — OFFICE VISIT (OUTPATIENT)
Dept: ANESTHESIOLOGY | Facility: CLINIC | Age: 58
End: 2023-04-18
Payer: COMMERCIAL

## 2023-04-18 VITALS — SYSTOLIC BLOOD PRESSURE: 121 MMHG | OXYGEN SATURATION: 98 % | DIASTOLIC BLOOD PRESSURE: 86 MMHG | HEART RATE: 106 BPM

## 2023-04-18 DIAGNOSIS — K94.23 GASTROSTOMY TUBE OBSTRUCTION (H): ICD-10-CM

## 2023-04-18 DIAGNOSIS — K31.84 GASTROPARESIS: ICD-10-CM

## 2023-04-18 DIAGNOSIS — Z98.890 POSTOPERATIVE STATE: Primary | ICD-10-CM

## 2023-04-18 PROCEDURE — 99214 OFFICE O/P EST MOD 30 MIN: CPT | Mod: 24 | Performed by: ANESTHESIOLOGY

## 2023-04-18 RX ORDER — METHOCARBAMOL 750 MG/1
750 TABLET, FILM COATED ORAL 4 TIMES DAILY
Qty: 120 TABLET | Refills: 3 | Status: ON HOLD | OUTPATIENT
Start: 2023-04-18 | End: 2023-06-07

## 2023-04-18 RX ORDER — FAMOTIDINE 40 MG/5ML
20 POWDER, FOR SUSPENSION ORAL DAILY
Qty: 50 ML | Refills: 4 | Status: SHIPPED | OUTPATIENT
Start: 2023-04-18 | End: 2023-07-31

## 2023-04-18 RX ORDER — FAMOTIDINE 40 MG/5ML
POWDER, FOR SUSPENSION ORAL
Qty: 50 ML | Refills: 0 | OUTPATIENT
Start: 2023-04-18

## 2023-04-18 ASSESSMENT — ENCOUNTER SYMPTOMS
DIZZINESS: 0
WEIGHT LOSS: 1
LOSS OF CONSCIOUSNESS: 0
DIARRHEA: 1
MEMORY LOSS: 0
TREMORS: 0
SORE THROAT: 0
POLYDIPSIA: 0
HALLUCINATIONS: 0
CHILLS: 0
VOMITING: 1
EYE REDNESS: 0
NERVOUS/ANXIOUS: 1
CONSTIPATION: 1
BLOOD IN STOOL: 0
SINUS PAIN: 0
INSOMNIA: 1
HEARTBURN: 1
DEPRESSION: 0
DOUBLE VISION: 0
TINGLING: 0
SPEECH CHANGE: 0
EYE PAIN: 0
WEAKNESS: 0
SEIZURES: 0
NAUSEA: 1
HEADACHES: 1
FEVER: 0
ABDOMINAL PAIN: 1

## 2023-04-18 ASSESSMENT — PAIN SCALES - GENERAL: PAINLEVEL: SEVERE PAIN (7)

## 2023-04-18 NOTE — PROGRESS NOTES
Pain clinic follow up note    HPI:   Dinora Mcghee is a 57 year old year old female who presents to the pain clinic with abdominal pain in different locations.    Patient Supplied Answers To the  Pain Questionnaire      4/17/2023     7:32 PM   [facility] -  Patient Entered Questionnaire/Answers   What number best describes your pain right now:  0 = No pain  to  10 = Worst pain imaginable 7   How would you describe the pain cramping    sharp    dull, aching   Which of the following worsen your pain lying down   Which of the following improve or reduce your pain medication   What number best describes your average pain for the past week:  0 = No pain  to  10 = Worst pain imaginable 7   What number best describes your LOWEST pain in past 24 hours:  0 = No pain  to  10 = Worst pain imaginable 2   What number best describes your WORST pain in past 24 hours:  0 = No pain  to  10 = Worst pain imaginable 9   When is your pain worst AM    Night    Constant   What non-medicine treatments have you already had for your pain pain clinic    physical therapy    relaxation training    acupuncture    TENS (electrical stimulator)    other nerve blocks    counseling    surgery    exercise             Dinora presents to the clinic with her . She has had multiple abdominal surgeries since last visit including replacement of dislodged J-tube and LAD. Her presenting pain last time (uppper quadrants, Rt > Lt) has resolved and the pain is more in the LLQ extending into the lower abdomen. She is wondering what options she has as far as medications and injections go. She feels relatively stable as far as her nausea management and fluid intake w/ 1L LR daily. She is trying to get back to her high flow rate with feeds but the titration has been slow going. We reviewed her current medications and there is room for adjustment on multiple medications so we will optimize those and revisit injections next appointment as the surgical site  heals.    ROS:  Review of Systems   Constitutional: Positive for weight loss. Negative for chills and fever.   HENT: Negative for ear discharge, ear pain, hearing loss, nosebleeds, sinus pain, sore throat and tinnitus.    Eyes: Negative for double vision, pain and redness.   Gastrointestinal: Positive for abdominal pain, constipation, diarrhea, heartburn, nausea and vomiting. Negative for blood in stool and melena.   Skin: Positive for itching. Negative for rash.   Neurological: Positive for headaches. Negative for dizziness, tingling, tremors, speech change, seizures, loss of consciousness and weakness.   Endo/Heme/Allergies: Negative for polydipsia.   Psychiatric/Behavioral: Negative for depression, hallucinations and memory loss. The patient is nervous/anxious and has insomnia.    All other systems reviewed and are negative.        Significant Medical History:   Past Medical History:   Diagnosis Date     Allergic rhinitis, cause unspecified      Allergy to other foods     strawberries, apples, celeries, alice, watermelon     Arthritis     left knee     Choledocholithiasis     long after cholecystectomy     Chronic abdominal pain      Chronic constipation      Chronic nausea      Chronic pancreatitis (H)      Degeneration of lumbar or lumbosacral intervertebral disc      Esophageal reflux     w/ hiatal hernia     Gastroparesis      Hiatal hernia      History of pituitary adenoma     s/p resection     Hypothyroidism      Migraines      Mild hyperlipidemia      On tube feeding diet     presence of GJ tube     Pancreatic disease     PD stricture, 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 endometriosis growth     APPENDECTOMY        SECTION       COLONOSCOPY       ENDOSCOPIC INSERTION TUBE GASTROSTOMY N/A 2021    Procedure: Gastrojejonostomy placement with jejunopexy, PEG tube exchange;  Surgeon: Zackery Montoya MD;  [facility]     ENDOSCOPIC RETROGRADE CHOLANGIOPANCREATOGRAM N/A 2015    Procedure: ENDOSCOPIC RETROGRADE CHOLANGIOPANCREATOGRAM;  Surgeon: Mandeep Park MD;  [facility]     ENDOSCOPIC RETROGRADE CHOLANGIOPANCREATOGRAM N/A 2016    Procedure: COMBINED ENDOSCOPIC RETROGRADE CHOLANGIOPANCREATOGRAPHY, PLACE TUBE/STENT;  Surgeon: Mandeep Park MD;  [facility]     ENDOSCOPIC RETROGRADE CHOLANGIOPANCREATOGRAM N/A 3/17/2016    Procedure: COMBINED ENDOSCOPIC RETROGRADE CHOLANGIOPANCREATOGRAPHY, REMOVE FOREIGN BODY OR STENT/TUBE;  Surgeon: Mandeep Park MD;  [facility]     ENDOSCOPIC RETROGRADE CHOLANGIOPANCREATOGRAM N/A 2016    Procedure: COMBINED ENDOSCOPIC RETROGRADE CHOLANGIOPANCREATOGRAPHY, PLACE TUBE/STENT;  Surgeon: Mandeep Park MD;  [facility]     ENDOSCOPIC RETROGRADE CHOLANGIOPANCREATOGRAM N/A 2016    Procedure: COMBINED ENDOSCOPIC RETROGRADE CHOLANGIOPANCREATOGRAPHY, REMOVE FOREIGN BODY OR STENT/TUBE;  Surgeon: Mandeep Park MD;  [facility]     ENDOSCOPIC ULTRASOUND UPPER GASTROINTESTINAL TRACT (GI) N/A 10/3/2016    Procedure: ENDOSCOPIC ULTRASOUND, ESOPHAGOSCOPY / UPPER GASTROINTESTINAL TRACT (GI);  Surgeon: Guru Jose Rodas MD;  [facility]     ENTEROSCOPY SMALL BOWEL N/A 2/3/2022    Procedure: ENTEROSCOPY with possible fistula closure;  Surgeon: Francisco Dodson MD;  Location:  GI     ESOPHAGOSCOPY, GASTROSCOPY, DUODENOSCOPY (EGD), COMBINED N/A 2015    Procedure: COMBINED ESOPHAGOSCOPY, GASTROSCOPY, DUODENOSCOPY (EGD), REMOVE FOREIGN BODY;  Surgeon: Mandeep Park MD;  Location:  GI     ESOPHAGOSCOPY, GASTROSCOPY,  DUODENOSCOPY (EGD), COMBINED N/A 10/25/2015    Procedure: COMBINED ESOPHAGOSCOPY, GASTROSCOPY, DUODENOSCOPY (EGD);  Surgeon: Sammy Amaro MD;  [facility]     ESOPHAGOSCOPY, GASTROSCOPY, DUODENOSCOPY (EGD), COMBINED N/A 10/25/2015    Procedure: COMBINED ESOPHAGOSCOPY, GASTROSCOPY, DUODENOSCOPY (EGD), BIOPSY SINGLE OR MULTIPLE;  Surgeon: Sammy Amaro MD;  [facility]     ESOPHAGOSCOPY, GASTROSCOPY, DUODENOSCOPY (EGD), COMBINED N/A 1/31/2023    Procedure: ESOPHAGOGASTRODUODENOSCOPY (EGD) with peg replacement ;  Surgeon: Mandeep Park MD;  [facility]     ESOPHAGOSCOPY, GASTROSCOPY, DUODENOSCOPY (EGD), DILATATION, COMBINED       EXCISE LESION TRUNK N/A 4/17/2017    Procedure: EXCISE LESION TRUNK;  Removal of Abdominal Foreign Body;  Surgeon: Nestor Phoenix MD;  [facility]     HC ESOPH/GAS REFLUX TEST W NASAL IMPED >1 HR N/A 11/19/2015    Procedure: ESOPHAGEAL IMPEDENCE FUNCTION TEST WITH 24 HOUR PH GREATER THAN 1 HOUR;  Surgeon: Thiago Apple MD;  [facility]     HC UGI ENDOSCOPY DIAG W BIOPSY  9/17/08     HC UGI ENDOSCOPY DIAG W BIOPSY  9/27/12     HC UGI ENDOSCOPY W ESOPHAGEAL DILATION BALLOON <30MM  9/17/08     HC UGI ENDOSCOPY W EUS N/A 5/5/2015    Procedure: COMBINED ENDOSCOPIC ULTRASOUND, ESOPHAGOSCOPY, GASTROSCOPY, DUODENOSCOPY (EGD);  Surgeon: Wm Dueñas MD;  [facility]     HC WRIST ARTHROSCOP,RELEASE XVERS LIG Bilateral 12/17/08     INJECT TRANSVERSUS ABDOMINIS PLANE (TAP) BLOCK BILATERAL Left 9/22/2016    Procedure: INJECT TRANSVERSUS ABDOMINIS PLANE (TAP) BLOCK BILATERAL;  Surgeon: Dickson Corrigan MD;  [facility]     INJECT TRIGGER POINT Bilateral 9/8/2022    Procedure: Abdominal trigger point injection with ultrasound;  Surgeon: Monika Mahajan MD;  [facility]     INJECT TRIGGER POINT SINGLE / MULTIPLE 3 OR MORE MUSCLES Right 11/15/2022    Procedure: Trigger point injections right abdomen with ultrasound guidance;  Surgeon:  Monika aMhajan MD;  [facility]: [facility]     IR CHEST PORT PLACEMENT < 5 YRS OF AGE  6/10/2022     laparoscopic pineda  1995     LAPAROSCOPIC HERNIORRHAPHY INCISIONAL N/A 8/23/2018    Procedure: LAPAROSCOPIC HERNIORRHAPHY INCISIONAL;  Laparoscopic Incisional Hernia Repair with Symbotex Mesh Implant;  Surgeon: Nestor Phoenix MD;  [facility]     LAPAROSCOPIC PANCREATECTOMY, TRANSPLANT AUTO ISLET CELL N/A 12/28/2016    Procedure: LAPAROSCOPIC PANCREATECTOMY, TRANSPLANT AUTO ISLET CELL;  Surgeon: Nestor Phoenix MD;  [facility]     LAPAROTOMY EXPLORATORY N/A 1/31/2023    Procedure: Exploratory Laparotomy, lysis of adhesions, Perforated J-Junostomy Resection, Replace J-Junostomy site;  Surgeon: Elver Bauer MD;  [facility]     LAPAROTOMY, LYSIS ADHESIONS, COMBINED N/A 1/31/2023    Procedure: Dilatation of jejunostomy feeding tube, track with placement of jejunostomy tube with fluoroscopy;  Surgeon: Elver Bauer MD;  [facility]     REMOVE AND REPLACE BREAST IMPLANT PROSTHESIS N/A 12/30/2022    Procedure: Exploratory Laparotomy, lysis of adhesions, small bowel resection. Placement of gastric jejunostomy for enteral feeding.;  Surgeon: Elver Bauer MD;  [facility]     REMOVE GASTROSTOMY TUBE ADULT N/A 1/28/2022    Procedure: REMOVAL, GASTROSTOMY TUBE, ADULT;  Surgeon: Mandeep Park MD;  [facility]     REPLACE GASTROJEJUNOSTOMY TUBE, PERCUTANEOUS N/A 9/7/2021    Procedure: GASTROJEJUNOSTOMY TUBE PLACEMENT, PERCUTANEOUS, WITH GASTROPEXY;  Surgeon: Mandeep Park MD;  [facility]     REPLACE GASTROJEJUNOSTOMY TUBE, PERCUTANEOUS N/A 9/22/2021    Procedure: REPLACEMENT, GASTROJEJUNOSTOMY TUBE, PERCUTANEOUS;  Surgeon: Zackery Montoya MD;  [facility]     REPLACE GASTROJEJUNOSTOMY TUBE, PERCUTANEOUS N/A 11/22/2022    Procedure: REPLACEMENT, GASTROJEJUNOSTOMY TUBE, PERCUTANEOUS;  Surgeon: Mandeep Park MD;  [facility]      REPLACE GASTROJEJUNOSTOMY TUBE, PERCUTANEOUS N/A 2022    Procedure: REPLACEMENT, GASTROJEJUNOSTOMY TUBE, PERCUTANEOUS with ENDOSCOPIC SUTURING.;  Surgeon: Mandeep Park MD;  [facility]     REPLACE JEJUNOSTOMY TUBE, PERCUTANEOUS N/A 9/10/2021    Procedure: UPPER ENDOSCOPY, REPLACEMENT OF PERCUTANEOUS GASTROJEJUNOSTOMY TUBE, T-TAG GASTROPEXY;  Surgeon: Zackery Montoya MD;  [facility]     REPLACE JEJUNOSTOMY TUBE, PERCUTANEOUS N/A 2021    Procedure: REPLACEMENT, JEJUNOSTOMY TUBE, PERCUTANEOUS;  Surgeon: Mandeep Park MD;  [facility]     transphenoidal pituitary resection       Z  DELIVERY ONLY       Z  DELIVERY ONLY      repeat c section with incidental cystotomy with repair     Z EXCIS PITUITARY,TRANSNASAL/SEPTAL      pituitary tumor removed for diabetes insipidus     ZZC TOTAL ABDOM HYSTERECTOMY      w/ bilateral salpingoophorectomy           Family History:  Family History   Problem Relation Age of Onset     Lipids Mother      Hypertension Mother      Thyroid Disease Mother      Depression Mother      Angina Mother      GERD Mother      Skin Cancer Mother      Migraines Mother      Autoimmune Disease Mother      Hyperlipidemia Mother      Mental Illness Mother      Eye Disorder Father         cataract, detached retina     Myocardial Infarction Father 60     Lipids Father      Cerebrovascular Disease Father      Depression Father      Substance Abuse Father      Anesthesia Reaction Father         stroke right after surgery     Cataracts Father      Osteoarthritis Father      Ulcerative Colitis Father      Autoimmune Disease Father      Heart Disease Father      Hyperlipidemia Father      Mental Illness Father      Interpersonal Violence Sister      Coronary Artery Disease Sister         angioplasty     GERD Sister      Substance Abuse Sister      Depression Sister      Thyroid Disease Sister      Eye Disorder Maternal  Grandmother         cataract     Thyroid Disease Maternal Grandmother      Diabetes Maternal Grandfather      Eye Disorder Paternal Grandmother         cataract     Diabetes Paternal Grandmother      Eye Disorder Paternal Grandfather         cataract     Diabetes Paternal Grandfather      Substance Abuse Paternal Grandfather      Eye Disorder Son         ptosis     Depression Son      Anxiety Disorder Son      Heart Disease Paternal Aunt      Diabetes Paternal Aunt      Diabetes Paternal Uncle      Heart Disease Paternal Uncle      Depression Nephew      Anxiety Disorder Nephew      Thyroid Disease Nephew      Diabetes Type 2  Cousin         paternal cousin     Autoimmune Disease Cousin      Autoimmune Disease Sister      Depression Sister      Mental Illness Sister      Substance Abuse Sister      Thyroid Disease Sister      Depression Son      Mental Illness Son      Thyroid Disease Nephew           Social History:  Social History     Socioeconomic History     Marital status:      Spouse name: Norris     Number of children: 3     Years of education: 16     Highest education level: Not on file   Occupational History     Occupation: director     Employer: [employer]   Tobacco Use     Smoking status: Former     Packs/day: 0.50     Years: 6.00     Pack years: 3.00     Types: Cigarettes     Start date: 1985     Quit date: 1992     Years since quittin.3     Smokeless tobacco: Not on file     Tobacco comments:     no 2nd hand   Vaping Use     Vaping status: Not on file   Substance and Sexual Activity     Alcohol use: Not Currently     Alcohol/week: 3.0 - 6.0 standard drinks of alcohol     Types: 1 - 2 Glasses of wine, 1 - 2 Cans of beer, 1 - 2 Shots of liquor per week     Comment: none since IGG     Drug use: No     Sexual activity: Not Currently     Partners: Male     Birth control/protection: Post-menopausal     Comment: Hystectomy    Other Topics Concern     Parent/sibling w/ CABG, MI or angioplasty  before 65F 55M? No      Service Not Asked     Blood Transfusions No     Caffeine Concern Not Asked     Occupational Exposure Not Asked     Hobby Hazards Not Asked     Sleep Concern Not Asked     Stress Concern Not Asked     Weight Concern Not Asked     Special Diet Not Asked     Back Care Not Asked     Exercise Not Asked     Bike Helmet Not Asked     Seat Belt Yes     Self-Exams Not Asked   Social History Narrative     with 3 children and a dog.  No smoking, etoh or drug use.  Worked as a  for [employer] in the past.  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     Social Determinants of Health     Financial Resource Strain: Not on file   Food Insecurity: Not on file   Transportation Needs: Not on file   Physical Activity: Not on file   Stress: Not on file   Social Connections: Not on file   Intimate Partner Violence: Not on file   Housing Stability: Not on file     Social History     Social History Narrative     with 3 children and a dog.  No smoking, etoh or drug use.  Worked as a  for [employer] [place] in the past.  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          Allergies:  Allergies   Allergen Reactions     Apple Anaphylaxis     Corticosteroids Other (See Comments)     All oral, IV and injectable steroids are contraindicated (unless in life threatening situations) in [facility] transplant recipients. They can cause irreversible loss of islet cell function. Please contact patient's transplant care coordinator ADI Gaffney RN at [phone]/pager [phone] and/or endocrinologist prior to administration.       Depakote [Divalproex Sodium] Other (See Comments)     Chest pain     Zithromax [Azithromycin Dihydrate] Anaphylaxis     Noted in 4/7/08 ER     Bromfenac      Other reaction(s): Headache     Codeine      Other reaction(s): Hallucinations     Darvocet [Propoxyphene  N-Apap] Itching     Morphine Nausea and Vomiting and Rash     Nalbuphine Hcl Rash     RASH :nubaine     Zosyn [Piperacillin-Tazobactam In D5w] Rash     Possible allergy, did have a diffuse rash that seemed drug related but could have also been related to soap in the hospital.      Bactrim [Sulfamethoxazole W-Trimethoprim] Other (See Comments) and Nausea and Vomiting     Severely low liver function.     Hmg-Coa-R Inhibitors Other (See Comments)     Previous liver issues, risks vs benefits felt to not warrant statin.  Discussed Oct 2022 visit     Tape [Adhesive Tape] Blisters     Tramadol      Zofran [Ondansetron] Other (See Comments)     migraine     Flagyl [Metronidazole] Hives and Rash       Current Medications:   Current Outpatient Medications   Medication Sig Dispense Refill     acarbose (PRECOSE) 100 MG tablet Take 1 tablet (100 mg) by mouth 3 times daily (with meals) 90 tablet 3     acetaminophen (TYLENOL) 325 MG/10.15ML solution 20.3 mLs (650 mg) by Per J Tube route every 6 hours as needed for mild pain or fever 473 mL 4     amylase-lipase-protease (CREON) 25703-03268 units CPEP per EC capsule Take 3-4 capsules by mouth 3 times daily (with meals) With oral meals 150 capsule 11     blood glucose (PAT CONTOUR NEXT) test strip Use to test blood sugar 6 times daily or as directed. 2 Box 11     blood glucose monitoring (PAT MICROLET) lancets Use to test blood sugar 6-8 times daily or as directed. 100 each 11     Continuous Blood Gluc Sensor (FREESTYLE LISA 2 SENSOR) [facility] USE ONE SENSOR ONCE EVERY 14 DAYS 2 each 11     cyclobenzaprine (FLEXERIL) 10 MG tablet 1 tablet (10 mg) by Per G Tube route 2 times daily as needed for muscle spasms 60 tablet 11     diphenhydrAMINE (BENADRYL ALLERGY) 25 MG capsule Take 1 capsule (25 mg) by mouth every 6 hours as needed for itching or allergies 56 capsule 0     estradiol (VAGIFEM) 10 MCG TABS vaginal tablet INSERT 1 TABLET(10 MCG) VAGINALLY 2 TIMES A WEEK 24 tablet 2      "famotidine (PEPCID) 40 MG/5ML suspension 2.5 mLs (20 mg) by Per J Tube route daily 50 mL 3     glucagon 1 MG kit Give 0.1 to 0.15mg( 10-15 units on Insulin sryinge) subcutaneous  every 15 minutes PRN for hypoglycemia. Remix new kit q24hr. Needs up to 3 kit/week. 10 each 3     glucose 40 % GEL gel Take 15-30 g by mouth every 15 minutes as needed for low blood sugar 3 Tube 2     Hydroactive Dressings (TEGADERM HYDROCOLLOID THIN) MISC Use under sensor site , change every 14 days 2 each 11     ibuprofen (ADVIL/MOTRIN) 400 MG tablet Take 1 tablet (400 mg) by mouth every 6 hours as needed for moderate pain 30 tablet 0     insulin syringe-needle U-100 (30G X 1/2\" 0.3 ML) 30G X 1/2\" 0.3 ML miscellaneous Use 3 syringes daily or as directed. 50 each 1     Lansoprazole (PREVACID IV) Inject into the vein daily       levothyroxine (SYNTHROID/LEVOTHROID) 137 MCG tablet 137 mcg by Per G Tube route daily       Lidocaine (LIDOCARE) 4 % Patch Place 1 patch onto the skin every 24 hours Apply patch to abdomen once daily as needed. Remove after 12 hours. To prevent lidocaine toxicity, should be patch free for 12 hrs daily. 15 patch 0     methocarbamol (ROBAXIN) 750 MG tablet 1 tablet (750 mg) by Per Feeding Tube route 4 times daily 120 tablet 3     montelukast (SINGULAIR) 10 MG tablet 10 mg by Per G Tube route At Bedtime       Nutritional Supplements (BOOST HIGH PROTEIN) LIQD After above baseline labs are drawn, give: 6 mL/kg to maximum of 360 mL; the beverage is to be consumed within 5 minutes.  0     order for DME Equipment being ordered:Cefaly 1 Device 0     oxyCODONE (ROXICODONE) 5 MG/5ML solution 5-10 mLs (5-10 mg) by Per J Tube route every 4 hours as needed for moderate pain (4-6) 473 mL 0     prochlorperazine (COMPAZINE) 10 MG tablet TAKE 1/2 TABLET(5 MG) BY MOUTH EVERY 6 HOURS AS NEEDED FOR NAUSEA OR VOMITING (Patient taking differently: 20 mg) 60 tablet 2     promethazine-phenylephrine (PROMETHAZINE VC) 6.25-5 MG/5ML syrup 20 " mLs (25 mg) by Per Feeding Tube route nightly as needed for nausea - Per Feeding Tube 473 mL 3     Prucalopride Succinate 2 MG TABS Take 1 tablet (2 mg) by mouth daily 30 tablet 11     scopolamine (TRANSDERM) 1 MG/3DAYS 72 hr patch Place 1 patch onto the skin every 72 hours 10 patch 11     scopolamine (TRANSDERM) 1 MG/3DAYS 72 hr patch Place 1 patch onto the skin every 72 hours 10 patch 11     sennosides (SENOKOT) 8.8 MG/5ML syrup 5 mLs by Per J Tube route 2 times daily 236 mL 11     Sharps Container (BD SHARPS ) MISC 1 Container as needed 1 each 1     STATIN NOT PRESCRIBED (INTENTIONAL) Previous liver issues, risks vs benefits felt to not warrant statin.    Discussed Oct 2022 visit       zinc oxide - white petrolatum (CRITIC-AID THICK MOIST BARRIER) 20-51% PSTE topical paste Apply 71 g topically every hour as needed for rash (Under J tube bumper when needed for skin protection) 170 g 0     ZOLMitriptan (ZOMIG-ZMT) 5 MG ODT Take 1 tablet (5 mg) by mouth at onset of headache for migraine May repeat in 2 hours. Max 2 tablets/24 hours. 18 tablet 6     Flexeril  Robaxin  Tylenol  Ibuprofen       Physical Exam:     /86 (BP Location: Right arm, Patient Position: Chair, Cuff Size: Adult Regular)   Pulse 106   SpO2 98%     General Appearance: No distress, seated comfortably  Mood: Euthymic  HE ENT: Non constricted pupils  Respiratory: Non labored breathing  GI: Soft, non distended, TTP throughout abdomen  Skin: No rashes over exposed skin. Abdominal surgical site appears C/D/I with clean dressing in place.   MS: Normal muscle bulk  Neuro: Cranial nerves 2-12 intact  Gait: non antalgic, ambulates with out assistance    Pain specific exam:  Carnett's positive.     ASSESSMENT AND PLAN:     Encounter Diagnosis:  Myofasial Pain  Abdominal pain  TAWANDA Mcghee is a 58 year old year old female  who presents to the pain clinic with moderate to severe left abdominal pain. Her previous upper quadrant TPI  and small bowel obstruction/LAD surgery have resolved the original pain. Her pain has now migrated to the left lower quadrant and into the lower abdomen from there. Her J-tube dislodged last week and was surgically replaced so we will defer any abdominal interventions and give the site time to heal. We can revisit options at follow up in 4-6 weeks.    RECOMMENDATIONS:     1. Medications:   - We will renew and increase in methocarbamol to the oral equivalent of 750mg QID PRN.   - We will also increase her tylenol dose to 30mL TID/QID PRN (equivalent of 1000mg per dose).   - She will continue with lidocaine patches and cream PRN.     Dosing, side effects, risks/benefits/alternatives were discussed with the patient in detail.    2. Procedure: No interventions at this time given proximity to recent surgery. We will consider TPI vs TAP block in the LLQ with US guidance at next visit based on her symptoms. Risks/benefits/alternatives were discussed.     3. Continue with psychology as scheduled. In the future would recommend to offer pain psychology again as we had a productive discussion today with her and her .     Follow up: 4-6 weeks for reassessment       Wale Ren M.D.  [facility] Pain Fellow      Answers for HPI/ROS submitted by the patient on 4/17/2023  General Symptoms: Yes  Skin Symptoms: Yes  HENT Symptoms: Yes  EYE SYMPTOMS: Yes  HEART SYMPTOMS: No  LUNG SYMPTOMS: No  INTESTINAL SYMPTOMS: Yes  URINARY SYMPTOMS: No  GYNECOLOGIC SYMPTOMS: No  BREAST SYMPTOMS: No  SKELETAL SYMPTOMS: No  BLOOD SYMPTOMS: No  NERVOUS SYSTEM SYMPTOMS: Yes  MENTAL HEALTH SYMPTOMS: Yes  Congestion: No  Trouble swallowing: No   Voice hoarseness: Yes  Mouth sores: No  Tooth pain: No  Gum tenderness: No  Bleeding gums: No  Change in taste: No  Change in sense of smell: No  Dry mouth: Yes  Hearing aid used: No  Neck lump: No  Loss of appetite: Yes  Weight gain: No  Fatigue: Yes  Night sweats: No  Increased stress: Yes  Excessive  hunger: No  Feeling hot or cold when others believe the temperature is normal: No  Loss of height: No  Post-operative complications: No  Surgical site pain: Yes  Change in or Loss of Energy: Yes  Hyperactivity: No  Confusion: No  Changes in hair: No  Changes in moles/birth marks: No  Changes in nails: Yes  Acne: No  Hair in places you don't want it: No  Change in facial hair: No  Warts: No  Non-healing sores: No  Scarring: No  Flaking of skin: No  Color changes of hands/feet in cold : No  Sun sensitivity: No  Skin thickening: No  Vision loss: No  Dry eyes: Yes  Watery eyes: Yes  Flashing of lights: No  Spots: Yes  Floaters: No  Crossed eyes: No  Tunnel Vision: No  Yellowing of eyes: No  Eye irritation: Yes  Bloating: Yes  Rectal or Anal pain: No  Fecal incontinence: No  Yellowing of skin or eyes: No  Vomit with blood: No  Change in stools: Yes  Trouble with coordination: No  Difficulty walking: No  Paralysis: No  Numbness: No  Trouble thinking or concentrating: No  Mood changes: Yes  Panic attacks: No    ATTENDING ATTESTATION  I saw the patient along with the pain medicine fellow Dr. Cris Sow. Please see his note above for full details. I was involved in gathering history, physical examination and development of the plan of care.

## 2023-04-18 NOTE — TELEPHONE ENCOUNTER
"Patient called, feeling overwhelmed and exhausted, in pain 7/10 all the time, nothing takes the pain away. Patient tried travel, went badly. Needs to step down from work because work is too mentally, physically exhausted. Cant get feeds past 35ml/hr via J, anything more than that bloats her up and she has to vent it out. Is on crushed meds, wonders how she's getting medication.     GERD is no different,doesn't think an imaging study would help right now to check on tube but will let us know if things change.     Feeds alarm a lot at night, keeping her up at night. Working with dietitian regarding feeds and reducing symptoms. IV hydration going ok.     Reviewed care for Gastroparesis: has had Botox for gastroparesis, no G-Poem per Dr Dodson, has G and J tubes with feeds, has tried Domperidone with no effect. Discussed referrals to [facility] or [facility] to their Gastroparesis clinics, she declines referral as this time, feels we're doing all the things other facilities could    Dr Mahajan continuing to prescribe pain meds, has increased dose on few meds, Dinora frustrated that she needs to wait 4 weeks until she returns because her pain is so bad.     \"I don't get to have a life\", patient very tearful. Wants to go with her family to the [place] for mothers day, but can't because cannot plan out very far in advance based on her symptoms. Missing other travel, can't work/contribute to her family. Daughter wants to plan a wedding in [place], patient fears that she won't be able to attend. \"I can't find 5 thing I'm grateful for in life right now.\" In [facility] every day at 6:30 pm, \"that's not me\"    Patient wants to apply for disability, stated we would help with that process. Emphasized that we would help with disability forms but that didn't mean our teams were giving up. Empathized with her emotions, complex health situation. Patient states that she's going to counseling and getting help, she'll try to stay positive. " Reordered Pepcid, deferred request to order tylenol to Dr Mahajan, added her to care teams    Encouraged patient to call back if she needed to talk.      Kenya Victor, RN Care Coordinator

## 2023-04-18 NOTE — NURSING NOTE
RN reviewed AVS with patient. Patient to contact clinic if any questions/concerns. Patient verbalized understanding.    Elsie Leone RNCC

## 2023-04-18 NOTE — LETTER
4/18/2023       RE: Dinora Mcghee  [address] [facility] [address]     Dear Colleague,    Thank you for referring your patient, Dinora Mcghee, to the [facility]NAG[facility] [place] at [facility]. Please see a copy of my visit note below.    Pain clinic follow up note    HPI:   Dinora Mcghee is a 57 year old year old female who presents to the pain clinic with abdominal pain in different locations.    Patient Supplied Answers To the  Pain Questionnaire      4/17/2023     7:32 PM   [facility] -  Patient Entered Questionnaire/Answers   What number best describes your pain right now:  0 = No pain  to  10 = Worst pain imaginable 7   How would you describe the pain cramping    sharp    dull, aching   Which of the following worsen your pain lying down   Which of the following improve or reduce your pain medication   What number best describes your average pain for the past week:  0 = No pain  to  10 = Worst pain imaginable 7   What number best describes your LOWEST pain in past 24 hours:  0 = No pain  to  10 = Worst pain imaginable 2   What number best describes your WORST pain in past 24 hours:  0 = No pain  to  10 = Worst pain imaginable 9   When is your pain worst AM    Night    Constant   What non-medicine treatments have you already had for your pain pain clinic    physical therapy    relaxation training    acupuncture    TENS (electrical stimulator)    other nerve blocks    counseling    surgery    exercise             Dinora presents to the clinic with her . She has had multiple abdominal surgeries since last visit including replacement of dislodged J-tube and LAD. Her presenting pain last time (uppper quadrants, Rt > Lt) has resolved and the pain is more in the LLQ extending into the lower abdomen. She is wondering what options she has as far as medications and injections go. She feels relatively stable as far as  her nausea management and fluid intake w/ 1L LR daily. She is trying to get back to her high flow rate with feeds but the titration has been slow going. We reviewed her current medications and there is room for adjustment on multiple medications so we will optimize those and revisit injections next appointment as the surgical site heals.    ROS:  Review of Systems   Constitutional: Positive for weight loss. Negative for chills and fever.   HENT: Negative for ear discharge, ear pain, hearing loss, nosebleeds, sinus pain, sore throat and tinnitus.    Eyes: Negative for double vision, pain and redness.   Gastrointestinal: Positive for abdominal pain, constipation, diarrhea, heartburn, nausea and vomiting. Negative for blood in stool and melena.   Skin: Positive for itching. Negative for rash.   Neurological: Positive for headaches. Negative for dizziness, tingling, tremors, speech change, seizures, loss of consciousness and weakness.   Endo/Heme/Allergies: Negative for polydipsia.   Psychiatric/Behavioral: Negative for depression, hallucinations and memory loss. The patient is nervous/anxious and has insomnia.    All other systems reviewed and are negative.        Significant Medical History:   Past Medical History:   Diagnosis Date    Allergic rhinitis, cause unspecified     Allergy to other foods     strawberries, apples, celeries, alice, watermelon    Arthritis     left knee    Choledocholithiasis     long after cholecystectomy    Chronic abdominal pain     Chronic constipation     Chronic nausea     Chronic pancreatitis (H)     Degeneration of lumbar or lumbosacral intervertebral disc     Esophageal reflux     w/ hiatal hernia    Gastroparesis     Hiatal hernia     History of pituitary adenoma     s/p resection    Hypothyroidism     Migraines     Mild hyperlipidemia     On tube feeding diet     presence of GJ tube    Pancreatic disease     PD stricture, 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 endometriosis growth    APPENDECTOMY       SECTION      COLONOSCOPY      ENDOSCOPIC INSERTION TUBE GASTROSTOMY N/A 2021    Procedure: Gastrojejonostomy placement with jejunopexy, PEG tube exchange;  Surgeon: Zackery Montoya MD;  [facility]    ENDOSCOPIC RETROGRADE CHOLANGIOPANCREATOGRAM N/A 2015    Procedure: ENDOSCOPIC RETROGRADE CHOLANGIOPANCREATOGRAM;  Surgeon: Mandeep Park MD;  [facility]    ENDOSCOPIC RETROGRADE CHOLANGIOPANCREATOGRAM N/A 2016    Procedure: COMBINED ENDOSCOPIC RETROGRADE CHOLANGIOPANCREATOGRAPHY, PLACE TUBE/STENT;  Surgeon: Mandeep Park MD;  [facility]    ENDOSCOPIC RETROGRADE CHOLANGIOPANCREATOGRAM N/A 3/17/2016    Procedure: COMBINED ENDOSCOPIC RETROGRADE CHOLANGIOPANCREATOGRAPHY, REMOVE FOREIGN BODY OR STENT/TUBE;  Surgeon: Mandeep Park MD;  [facility]    ENDOSCOPIC RETROGRADE CHOLANGIOPANCREATOGRAM N/A 2016    Procedure: COMBINED ENDOSCOPIC RETROGRADE CHOLANGIOPANCREATOGRAPHY, PLACE TUBE/STENT;  Surgeon: Mandeep Park MD;  [facility]    ENDOSCOPIC RETROGRADE CHOLANGIOPANCREATOGRAM N/A 2016    Procedure: COMBINED ENDOSCOPIC RETROGRADE CHOLANGIOPANCREATOGRAPHY, REMOVE FOREIGN BODY OR STENT/TUBE;  Surgeon: Mandeep Park MD;  [facility]    ENDOSCOPIC ULTRASOUND UPPER GASTROINTESTINAL TRACT (GI) N/A 10/3/2016    Procedure: ENDOSCOPIC ULTRASOUND, ESOPHAGOSCOPY / UPPER GASTROINTESTINAL TRACT (GI);  Surgeon: Guru Jose Rodas MD;  [facility]    ENTEROSCOPY SMALL BOWEL N/A 2/3/2022    Procedure: ENTEROSCOPY with possible fistula  closure;  Surgeon: Francisco Dodson MD;  Location: [facility]    ESOPHAGOSCOPY, GASTROSCOPY, DUODENOSCOPY (EGD), COMBINED N/A 6/24/2015    Procedure: COMBINED ESOPHAGOSCOPY, GASTROSCOPY, DUODENOSCOPY (EGD), REMOVE FOREIGN BODY;  Surgeon: Mandeep Park MD;  [facility]    ESOPHAGOSCOPY, GASTROSCOPY, DUODENOSCOPY (EGD), COMBINED N/A 10/25/2015    Procedure: COMBINED ESOPHAGOSCOPY, GASTROSCOPY, DUODENOSCOPY (EGD);  Surgeon: Sammy Amaro MD;  [facility]    ESOPHAGOSCOPY, GASTROSCOPY, DUODENOSCOPY (EGD), COMBINED N/A 10/25/2015    Procedure: COMBINED ESOPHAGOSCOPY, GASTROSCOPY, DUODENOSCOPY (EGD), BIOPSY SINGLE OR MULTIPLE;  Surgeon: Sammy Amaro MD;  [facility]    ESOPHAGOSCOPY, GASTROSCOPY, DUODENOSCOPY (EGD), COMBINED N/A 1/31/2023    Procedure: ESOPHAGOGASTRODUODENOSCOPY (EGD) with peg replacement ;  Surgeon: Mandeep Park MD;  [facility]    ESOPHAGOSCOPY, GASTROSCOPY, DUODENOSCOPY (EGD), DILATATION, COMBINED      EXCISE LESION TRUNK N/A 4/17/2017    Procedure: EXCISE LESION TRUNK;  Removal of Abdominal Foreign Body;  Surgeon: Nestor Phoenix MD;  [facility]    HC ESOPH/GAS REFLUX TEST W NASAL IMPED >1 HR N/A 11/19/2015    Procedure: ESOPHAGEAL IMPEDENCE FUNCTION TEST WITH 24 HOUR PH GREATER THAN 1 HOUR;  Surgeon: Thiago Apple MD;  [facility]    HC UGI ENDOSCOPY DIAG W BIOPSY  9/17/08    HC UGI ENDOSCOPY DIAG W BIOPSY  9/27/12    HC UGI ENDOSCOPY W ESOPHAGEAL DILATION BALLOON <30MM  9/17/08    HC UGI ENDOSCOPY W EUS N/A 5/5/2015    Procedure: COMBINED ENDOSCOPIC ULTRASOUND, ESOPHAGOSCOPY, GASTROSCOPY, DUODENOSCOPY (EGD);  Surgeon: Wm Dueñas MD;  [facility]    HC WRIST ARTHROSCOP,RELEASE XVERS LIG Bilateral 12/17/08    INJECT TRANSVERSUS ABDOMINIS PLANE (TAP) BLOCK BILATERAL Left 9/22/2016    Procedure: INJECT TRANSVERSUS ABDOMINIS PLANE (TAP) BLOCK BILATERAL;  Surgeon: Dickson Corrigan MD;  [facility]    INJECT TRIGGER POINT  Bilateral 9/8/2022    Procedure: Abdominal trigger point injection with ultrasound;  Surgeon: Monika Mahajan MD;  [facility]    INJECT TRIGGER POINT SINGLE / MULTIPLE 3 OR MORE MUSCLES Right 11/15/2022    Procedure: Trigger point injections right abdomen with ultrasound guidance;  Surgeon: Monika Mahajan MD;  [facility]    IR CHEST PORT PLACEMENT < 5 YRS OF AGE  6/10/2022    laparoscopic pineda  1995    LAPAROSCOPIC HERNIORRHAPHY INCISIONAL N/A 8/23/2018    Procedure: LAPAROSCOPIC HERNIORRHAPHY INCISIONAL;  Laparoscopic Incisional Hernia Repair with Symbotex Mesh Implant;  Surgeon: Nestor Phoenix MD;  [facility]    LAPAROSCOPIC PANCREATECTOMY, TRANSPLANT AUTO ISLET CELL N/A 12/28/2016    Procedure: LAPAROSCOPIC PANCREATECTOMY, TRANSPLANT AUTO ISLET CELL;  Surgeon: Nestor Phoenix MD;  [facility]    LAPAROTOMY EXPLORATORY N/A 1/31/2023    Procedure: Exploratory Laparotomy, lysis of adhesions, Perforated J-Junostomy Resection, Replace J-Junostomy site;  Surgeon: Elver Bauer MD;  [facility]    LAPAROTOMY, LYSIS ADHESIONS, COMBINED N/A 1/31/2023    Procedure: Dilatation of jejunostomy feeding tube, track with placement of jejunostomy tube with fluoroscopy;  Surgeon: Elver Bauer MD;  [facility]    REMOVE AND REPLACE BREAST IMPLANT PROSTHESIS N/A 12/30/2022    Procedure: Exploratory Laparotomy, lysis of adhesions, small bowel resection. Placement of gastric jejunostomy for enteral feeding.;  Surgeon: Elver Bauer MD;  [facility]    REMOVE GASTROSTOMY TUBE ADULT N/A 1/28/2022    Procedure: REMOVAL, GASTROSTOMY TUBE, ADULT;  Surgeon: Mandeep Park MD;  [facility]    REPLACE GASTROJEJUNOSTOMY TUBE, PERCUTANEOUS N/A 9/7/2021    Procedure: GASTROJEJUNOSTOMY TUBE PLACEMENT, PERCUTANEOUS, WITH GASTROPEXY;  Surgeon: Mandeep Park MD;  [facility]    REPLACE GASTROJEJUNOSTOMY TUBE, PERCUTANEOUS N/A 9/22/2021    Procedure:  REPLACEMENT, GASTROJEJUNOSTOMY TUBE, PERCUTANEOUS;  Surgeon: Zackery Montoya MD;  [facility]    REPLACE GASTROJEJUNOSTOMY TUBE, PERCUTANEOUS N/A 2022    Procedure: REPLACEMENT, GASTROJEJUNOSTOMY TUBE, PERCUTANEOUS;  Surgeon: Mandeep Park MD;  [facility]    REPLACE GASTROJEJUNOSTOMY TUBE, PERCUTANEOUS N/A 2022    Procedure: REPLACEMENT, GASTROJEJUNOSTOMY TUBE, PERCUTANEOUS with ENDOSCOPIC SUTURING.;  Surgeon: Mandeep Park MD;  [facility]    REPLACE JEJUNOSTOMY TUBE, PERCUTANEOUS N/A 9/10/2021    Procedure: UPPER ENDOSCOPY, REPLACEMENT OF PERCUTANEOUS GASTROJEJUNOSTOMY TUBE, T-TAG GASTROPEXY;  Surgeon: Zackery Montoya MD;  [facility]    REPLACE JEJUNOSTOMY TUBE, PERCUTANEOUS N/A 2021    Procedure: REPLACEMENT, JEJUNOSTOMY TUBE, PERCUTANEOUS;  Surgeon: Mandeep Park MD;  [facility]    transphenoidal pituitary resection      [facility]  DELIVERY ONLY      [facility]  DELIVERY ONLY      repeat c section with incidental cystotomy with repair    [facility] EXCIS PITUITARY,TRANSNASAL/SEPTAL      pituitary tumor removed for diabetes insipidus    [facility] TOTAL ABDOM HYSTERECTOMY      w/ bilateral salpingoophorectomy           Family History:  Family History   Problem Relation Age of Onset    Lipids Mother     Hypertension Mother     Thyroid Disease Mother     Depression Mother     Angina Mother     GERD Mother     Skin Cancer Mother     Migraines Mother     Autoimmune Disease Mother     Hyperlipidemia Mother     Mental Illness Mother     Eye Disorder Father         cataract, detached retina    Myocardial Infarction Father 60    Lipids Father     Cerebrovascular Disease Father     Depression Father     Substance Abuse Father     Anesthesia Reaction Father         stroke right after surgery    Cataracts Father     Osteoarthritis Father     Ulcerative Colitis Father     Autoimmune Disease Father     Heart Disease Father      Hyperlipidemia Father     Mental Illness Father     Interpersonal Violence Sister     Coronary Artery Disease Sister         angioplasty    GERD Sister     Substance Abuse Sister     Depression Sister     Thyroid Disease Sister     Eye Disorder Maternal Grandmother         cataract    Thyroid Disease Maternal Grandmother     Diabetes Maternal Grandfather     Eye Disorder Paternal Grandmother         cataract    Diabetes Paternal Grandmother     Eye Disorder Paternal Grandfather         cataract    Diabetes Paternal Grandfather     Substance Abuse Paternal Grandfather     Eye Disorder Son         ptosis    Depression Son     Anxiety Disorder Son     Heart Disease Paternal Aunt     Diabetes Paternal Aunt     Diabetes Paternal Uncle     Heart Disease Paternal Uncle     Depression Nephew     Anxiety Disorder Nephew     Thyroid Disease Nephew     Diabetes Type 2  Cousin         paternal cousin    Autoimmune Disease Cousin     Autoimmune Disease Sister     Depression Sister     Mental Illness Sister     Substance Abuse Sister     Thyroid Disease Sister     Depression Son     Mental Illness Son     Thyroid Disease Nephew           Social History:  Social History     Socioeconomic History    Marital status:      Spouse name: Norris    Number of children: 3    Years of education: 16    Highest education level: Not on file   Occupational History    Occupation: director     Employer: [employer]   Tobacco Use    Smoking status: Former     Packs/day: 0.50     Years: 6.00     Pack years: 3.00     Types: Cigarettes     Start date: 1985     Quit date: 1992     Years since quittin.3    Smokeless tobacco: Not on file    Tobacco comments:     no 2nd hand   Vaping Use    Vaping status: Not on file   Substance and Sexual Activity    Alcohol use: Not Currently     Alcohol/week: 3.0 - 6.0 standard drinks of alcohol     Types: 1 - 2 Glasses of wine, 1 - 2 Cans of beer, 1 - 2 Shots of liquor per week     Comment: none since  IGG    Drug use: No    Sexual activity: Not Currently     Partners: Male     Birth control/protection: Post-menopausal     Comment: Hystectomy 1999   Other Topics Concern    Parent/sibling w/ CABG, MI or angioplasty before 65F 55M? No     Service Not Asked    Blood Transfusions No    Caffeine Concern Not Asked    Occupational Exposure Not Asked    Hobby Hazards Not Asked    Sleep Concern Not Asked    Stress Concern Not Asked    Weight Concern Not Asked    Special Diet Not Asked    Back Care Not Asked    Exercise Not Asked    Bike Helmet Not Asked    Seat Belt Yes    Self-Exams Not Asked   Social History Narrative     with 3 children and a dog.  No smoking, etoh or drug use.  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     Social Determinants of Health     Financial Resource Strain: Not on file   Food Insecurity: Not on file   Transportation Needs: Not on file   Physical Activity: Not on file   Stress: Not on file   Social Connections: Not on file   Intimate Partner Violence: Not on file   Housing Stability: Not on file     Social History     Social History Narrative     with 3 children and a dog.  No smoking, etoh or drug use.  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          Allergies:  Allergies   Allergen Reactions    Apple Anaphylaxis    Corticosteroids Other (See Comments)     All oral, IV and injectable steroids are contraindicated (unless in life threatening situations) in [facility] Auto transplant recipients. They can cause irreversible loss of islet cell function. Please contact patient's transplant care coordinator ADI Gaffney RN at [phone]/pager [phone] and/or endocrinologist prior to administration.      Depakote [Divalproex Sodium] Other (See Comments)     Chest pain     Zithromax [Azithromycin Dihydrate] Anaphylaxis     Noted in 4/7/08 ER    Bromfenac      Other reaction(s): Headache    Codeine      Other reaction(s): Hallucinations    Darvocet [Propoxyphene N-Apap] Itching    Morphine Nausea and Vomiting and Rash    Nalbuphine Hcl Rash     RASH :nubaine    Zosyn [Piperacillin-Tazobactam In D5w] Rash     Possible allergy, did have a diffuse rash that seemed drug related but could have also been related to soap in the hospital.     Bactrim [Sulfamethoxazole W-Trimethoprim] Other (See Comments) and Nausea and Vomiting     Severely low liver function.    Hmg-Coa-R Inhibitors Other (See Comments)     Previous liver issues, risks vs benefits felt to not warrant statin.  Discussed Oct 2022 visit    Tape [Adhesive Tape] Blisters    Tramadol     Zofran [Ondansetron] Other (See Comments)     migraine    Flagyl [Metronidazole] Hives and Rash       Current Medications:   Current Outpatient Medications   Medication Sig Dispense Refill    acarbose (PRECOSE) 100 MG tablet Take 1 tablet (100 mg) by mouth 3 times daily (with meals) 90 tablet 3    acetaminophen (TYLENOL) 325 MG/10.15ML solution 20.3 mLs (650 mg) by Per J Tube route every 6 hours as needed for mild pain or fever 473 mL 4    amylase-lipase-protease (CREON) 35587-54016 units CPEP per EC capsule Take 3-4 capsules by mouth 3 times daily (with meals) With oral meals 150 capsule 11    blood glucose (PAT CONTOUR NEXT) test strip Use to test blood sugar 6 times daily or as directed. 2 Box 11    blood glucose monitoring (PAT MICROLET) lancets Use to test blood sugar 6-8 times daily or as directed. 100 each 11    Continuous Blood Gluc Sensor (FREESTYLE LISA 2 SENSOR) [employer] [facility] USE ONE SENSOR ONCE EVERY 14 DAYS 2 each 11    cyclobenzaprine (FLEXERIL) 10 MG tablet 1 tablet (10 mg) by Per G Tube route 2 times daily as needed for muscle spasms 60 tablet 11    diphenhydrAMINE (BENADRYL ALLERGY) 25 MG capsule Take 1 capsule (25 mg) by mouth  "every 6 hours as needed for itching or allergies 56 capsule 0    estradiol (VAGIFEM) 10 MCG TABS vaginal tablet INSERT 1 TABLET(10 MCG) VAGINALLY 2 TIMES A WEEK 24 tablet 2    famotidine (PEPCID) 40 MG/5ML suspension 2.5 mLs (20 mg) by Per J Tube route daily 50 mL 3    glucagon 1 MG kit Give 0.1 to 0.15mg( 10-15 units on Insulin sryinge) subcutaneous  every 15 minutes PRN for hypoglycemia. Remix new kit q24hr. Needs up to 3 kit/week. 10 each 3    glucose 40 % GEL gel Take 15-30 g by mouth every 15 minutes as needed for low blood sugar 3 Tube 2    Hydroactive Dressings (TEGADERM HYDROCOLLOID THIN) MISC Use under sensor site , change every 14 days 2 each 11    ibuprofen (ADVIL/MOTRIN) 400 MG tablet Take 1 tablet (400 mg) by mouth every 6 hours as needed for moderate pain 30 tablet 0    insulin syringe-needle U-100 (30G X 1/2\" 0.3 ML) 30G X 1/2\" 0.3 ML miscellaneous Use 3 syringes daily or as directed. 50 each 1    Lansoprazole (PREVACID IV) Inject into the vein daily      levothyroxine (SYNTHROID/LEVOTHROID) 137 MCG tablet 137 mcg by Per G Tube route daily      Lidocaine (LIDOCARE) 4 % Patch Place 1 patch onto the skin every 24 hours Apply patch to abdomen once daily as needed. Remove after 12 hours. To prevent lidocaine toxicity, should be patch free for 12 hrs daily. 15 patch 0    methocarbamol (ROBAXIN) 750 MG tablet 1 tablet (750 mg) by Per Feeding Tube route 4 times daily 120 tablet 3    montelukast (SINGULAIR) 10 MG tablet 10 mg by Per G Tube route At Bedtime      Nutritional Supplements (BOOST HIGH PROTEIN) LIQD After above baseline labs are drawn, give: 6 mL/kg to maximum of 360 mL; the beverage is to be consumed within 5 minutes.  0    order for DME Equipment being ordered:Cefaly 1 Device 0    oxyCODONE (ROXICODONE) 5 MG/5ML solution 5-10 mLs (5-10 mg) by Per J Tube route every 4 hours as needed for moderate pain (4-6) 473 mL 0    prochlorperazine (COMPAZINE) 10 MG tablet TAKE 1/2 TABLET(5 MG) BY MOUTH EVERY " 6 HOURS AS NEEDED FOR NAUSEA OR VOMITING (Patient taking differently: 20 mg) 60 tablet 2    promethazine-phenylephrine (PROMETHAZINE VC) 6.25-5 MG/5ML syrup 20 mLs (25 mg) by Per Feeding Tube route nightly as needed for nausea - Per Feeding Tube 473 mL 3    Prucalopride Succinate 2 MG TABS Take 1 tablet (2 mg) by mouth daily 30 tablet 11    scopolamine (TRANSDERM) 1 MG/3DAYS 72 hr patch Place 1 patch onto the skin every 72 hours 10 patch 11    scopolamine (TRANSDERM) 1 MG/3DAYS 72 hr patch Place 1 patch onto the skin every 72 hours 10 patch 11    sennosides (SENOKOT) 8.8 MG/5ML syrup 5 mLs by Per J Tube route 2 times daily 236 mL 11    Sharps Container (BD SHARPS ) MISC 1 Container as needed 1 each 1    STATIN NOT PRESCRIBED (INTENTIONAL) Previous liver issues, risks vs benefits felt to not warrant statin.    Discussed Oct 2022 visit      zinc oxide - white petrolatum (CRITIC-AID THICK MOIST BARRIER) 20-51% PSTE topical paste Apply 71 g topically every hour as needed for rash (Under J tube bumper when needed for skin protection) 170 g 0    ZOLMitriptan (ZOMIG-ZMT) 5 MG ODT Take 1 tablet (5 mg) by mouth at onset of headache for migraine May repeat in 2 hours. Max 2 tablets/24 hours. 18 tablet 6     Flexeril  Robaxin  Tylenol  Ibuprofen       Physical Exam:     /86 (BP Location: Right arm, Patient Position: Chair, Cuff Size: Adult Regular)   Pulse 106   SpO2 98%     General Appearance: No distress, seated comfortably  Mood: Euthymic  HE ENT: Non constricted pupils  Respiratory: Non labored breathing  GI: Soft, non distended, TTP throughout abdomen  Skin: No rashes over exposed skin. Abdominal surgical site appears C/D/I with clean dressing in place.   MS: Normal muscle bulk  Neuro: Cranial nerves 2-12 intact  Gait: non antalgic, ambulates with out assistance    Pain specific exam:  Carnett's positive.     ASSESSMENT AND PLAN:     Encounter Diagnosis:  Myofasial Pain  Abdominal pain  ACNES       Dinora  EDER Mcghee is a 58 year old year old female  who presents to the pain clinic with moderate to severe left abdominal pain. Her previous upper quadrant TPI and small bowel obstruction/LAD surgery have resolved the original pain. Her pain has now migrated to the left lower quadrant and into the lower abdomen from there. Her J-tube dislodged last week and was surgically replaced so we will defer any abdominal interventions and give the site time to heal. We can revisit options at follow up in 4-6 weeks.    RECOMMENDATIONS:     1. Medications:   - We will renew and increase in methocarbamol to the oral equivalent of 750mg QID PRN.   - We will also increase her tylenol dose to 30mL TID/QID PRN (equivalent of 1000mg per dose).   - She will continue with lidocaine patches and cream PRN.     Dosing, side effects, risks/benefits/alternatives were discussed with the patient in detail.    2. Procedure: No interventions at this time given proximity to recent surgery. We will consider TPI vs TAP block in the LLQ with US guidance at next visit based on her symptoms. Risks/benefits/alternatives were discussed.     3. Continue with psychology as scheduled. In the future would recommend to offer pain psychology again as we had a productive discussion today with her and her .     Follow up: 4-6 weeks for reassessment       Wale Ren M.D.  [facility] Pain Fellow      Answers for HPI/ROS submitted by the patient on 4/17/2023  General Symptoms: Yes  Skin Symptoms: Yes  HENT Symptoms: Yes  EYE SYMPTOMS: Yes  HEART SYMPTOMS: No  LUNG SYMPTOMS: No  INTESTINAL SYMPTOMS: Yes  URINARY SYMPTOMS: No  GYNECOLOGIC SYMPTOMS: No  BREAST SYMPTOMS: No  SKELETAL SYMPTOMS: No  BLOOD SYMPTOMS: No  NERVOUS SYSTEM SYMPTOMS: Yes  MENTAL HEALTH SYMPTOMS: Yes  Congestion: No  Trouble swallowing: No   Voice hoarseness: Yes  Mouth sores: No  Tooth pain: No  Gum tenderness: No  Bleeding gums: No  Change in taste: No  Change in sense of smell: No  Dry mouth:  Yes  Hearing aid used: No  Neck lump: No  Loss of appetite: Yes  Weight gain: No  Fatigue: Yes  Night sweats: No  Increased stress: Yes  Excessive hunger: No  Feeling hot or cold when others believe the temperature is normal: No  Loss of height: No  Post-operative complications: No  Surgical site pain: Yes  Change in or Loss of Energy: Yes  Hyperactivity: No  Confusion: No  Changes in hair: No  Changes in moles/birth marks: No  Changes in nails: Yes  Acne: No  Hair in places you don't want it: No  Change in facial hair: No  Warts: No  Non-healing sores: No  Scarring: No  Flaking of skin: No  Color changes of hands/feet in cold : No  Sun sensitivity: No  Skin thickening: No  Vision loss: No  Dry eyes: Yes  Watery eyes: Yes  Flashing of lights: No  Spots: Yes  Floaters: No  Crossed eyes: No  Tunnel Vision: No  Yellowing of eyes: No  Eye irritation: Yes  Bloating: Yes  Rectal or Anal pain: No  Fecal incontinence: No  Yellowing of skin or eyes: No  Vomit with blood: No  Change in stools: Yes  Trouble with coordination: No  Difficulty walking: No  Paralysis: No  Numbness: No  Trouble thinking or concentrating: No  Mood changes: Yes  Panic attacks: No    ATTENDING ATTESTATION  I saw the patient along with the pain medicine fellow Dr. Cris Sow. Please see his note above for full details. I was involved in gathering history, physical examination and development of the plan of care.           Again, thank you for allowing me to participate in the care of your patient.      Sincerely,    Monika Mahajan MD

## 2023-04-18 NOTE — PATIENT INSTRUCTIONS
Medications:    methocarbamol (ROBAXIN) 750 MG tablet - 1 tablet (750 mg) by Per Feeding Tube route 4 times daily    Take Tylenol 1000 mg 4 times daily as needed    Continue lidocaine patches    Please provide the clinic with a minium of 1 week notice, on all prescription refills.         Recommended Follow up:      Follow up in 4-6 weeks    To speak with a nurse, schedule/reschedule/cancel a clinic appointment, or request a medication refill call: [phone].    You can also reach us by [employer]: [email or web address]

## 2023-04-18 NOTE — NURSING NOTE
Patient presents with:  Follow Up: Abdominal pain      Severe Pain (7)     What medications are you using for pain? Tylenol, ibuprofen, Robaxin, Flexeril, oxycodone (after procedure last week)    (Return Patients only) What refills are you needing today? None

## 2023-04-20 ENCOUNTER — MYC MEDICAL ADVICE (OUTPATIENT)
Dept: GASTROENTEROLOGY | Facility: CLINIC | Age: 58
End: 2023-04-20
Payer: COMMERCIAL

## 2023-04-20 ENCOUNTER — MYC MEDICAL ADVICE (OUTPATIENT)
Dept: INTERNAL MEDICINE | Facility: CLINIC | Age: 58
End: 2023-04-20
Payer: COMMERCIAL

## 2023-04-20 ENCOUNTER — PATIENT OUTREACH (OUTPATIENT)
Dept: GASTROENTEROLOGY | Facility: CLINIC | Age: 58
End: 2023-04-20
Payer: COMMERCIAL

## 2023-04-20 RX ORDER — ACETAMINOPHEN 160 MG/5ML
SUSPENSION ORAL
Qty: 473 ML | Refills: 4 | Status: ON HOLD | OUTPATIENT
Start: 2023-04-20 | End: 2023-05-12

## 2023-04-20 NOTE — TELEPHONE ENCOUNTER
ACETAMINOPHEN CHILDRENS [facility] SUSP      Last Written Prescription Date:  2-15-23  Last Fill Quantity: 473 ml,   # refills: 4  Last Office Visit : 2-22-23  Future Office visit:  none    Routing refill request to provider for review/approval because:  Not on general surgery protocol

## 2023-04-20 NOTE — TELEPHONE ENCOUNTER
Per patient, having 7/10 pain. Saw Dr Mahajan- maximize tylenol, using lidocaine patch, flexeril 4x day, robaxin - frustrated with management of meds with Dr Mahajan, meds are not helping, Dr Mahajan recommended to increase flexeril, but now say that they won't order more because they don't manage that medication. Patient frustrated with care and medication management with Dr Mahajan, previously had good experience.    Recommended patient see PCP for increased flexeril dose. Will schedule check in with Dr. Park 4/26 to discuss pain management plan, overall health that's making her want to file for disability and step away from work. Overbook scheduled for 4/26, patient knows plan    ML

## 2023-04-20 NOTE — TELEPHONE ENCOUNTER
Per patient, estefany came out again, she put a new one in but requires new supplies from [facility]  An 18 Fr KISHORE-KEY low-profile gastrostomy     Left message for [facility]    ML

## 2023-04-24 DIAGNOSIS — K31.84 GASTROPARESIS: ICD-10-CM

## 2023-04-24 RX ORDER — FAMOTIDINE 40 MG/5ML
POWDER, FOR SUSPENSION ORAL
Qty: 50 ML | Refills: 0 | OUTPATIENT
Start: 2023-04-24

## 2023-04-24 NOTE — PROGRESS NOTES
Dinora is a 58 year old who is being evaluated via a billable video visit.      How would you like to obtain your AVS? [email or web address]  If the video visit is dropped, the invitation should be resent by: Text to cell phone: [phone]  Will anyone else be joining your video visit? No        Assessment & Plan     Chronic pain syndrome    Dinora presents today to discuss chronic pain and gastroparesis.  Her pain provider recently increased her Tylenol and Robaxin dosing, and she is wondering about her Flexeril that she is also on.  Discussed I typically don't have patient on two different muscle relaxers unless for example they are using one just at night and one during the day.  She is also not sure if the Flexeril is doing anything, so we discussed potentially coming off it as noted below.  Dinora is frustrated that her pain only improved slightly with the recent changes by the pain clinic. She does have an appointment with them next month but feels that is too long to wait, so I encouraged her to contact them to see if they have further suggestions in the meantime.      Plan per patient instructions:    Either reduce the Flexeril to 5mg or stop it entirely for the morning dose for a week to see if you notice any difference in symptoms.  If symptoms worsen, then resume the 10mg dose and continue this twice per day, but I would not increase this further since you are already increasing Robaxin.  If the reduction in Flexeril in the morning doesn't worsen symptoms, then we can also try stopping/reducing at night.    Please let me know via [employer] how this is going in a week or two.    Contact the pain clinic to see if they have any suggestions for your pain prior to your appointment next month.    Work with GI for the disability application and let me know if you need any assistance from me as well.        27 minutes spent by me on the date of the encounter doing chart review, history and exam, documentation and further  "activities per the note      Juan Jose Gomez MD  [facility] INTERNAL MEDICINE [place]    Curtis Farias is a 58 year old, presenting for the following health issues:  Video Visit, Medication Follow-up, and disability         View : No data to display.              HPI     Dinora sent the following via [employer]: \"The pain clinics plan is to maximize Tylenol dosage, increase Robaxin from 500 to 650 and come back in a month. The pain clinic questions if I need to be on the Flexerill while also on Robaxin.I want to ask you if should I keep taking it as is, take it at an increase frequency or stop taking it all together?\"    Today, Dinora reports she isn't sure that the Flexeril is helping; she is taking this morning and evening.  The increased Tylenol isn't helping.  She is using the Robaxin three times per day currently (taking this morning, noon to 3pm, and before bed) and does notice a difference if she doesn't take it.     J tube dislodged earlier this month and had to be replaced. She also reports she is popping out the G tube every couple of days.     She has been talking with the GI care coordinator about applying for disability.  She is scheduled to talk to them tomorrow per their note but doesn't appear to be anything on their schedule- she is going to call them.  She has started an application for disability, but got overwhelmed and had to pause and then lost her password, which she states is an example of her lack of mental acuity she is experiencing.     She is sleeping poorly since she is afraid that her tube will fall out overnight again.  She is having a lot of fatigue and not able to concentrate well during the day.  She had a change in her formula that has resulted in more diarrhea so she is going to follow-up with RD.  She feels like she is not living, just existing.  She is following with a therapist.          Review of Systems     Answers for HPI/ROS submitted by the patient on " "4/25/2023  If you checked off any problems, how difficult have these problems made it for you to do your work, take care of things at home, or get along with other people?: Very difficult  PHQ9 TOTAL SCORE: 13          Objective    Vitals - Patient Reported  Weight (Patient Reported): 58.1 kg (128 lb)  Height (Patient Reported): 172.7 cm (5' 8\")  BMI (Based on Pt Reported Ht/Wt): 19.46  Pain Score: Moderate Pain (5)  Pain Loc: Other - see comment (abdominal pain)      Vitals:  No vitals were obtained today due to virtual visit.    Physical Exam   GENERAL: alert and no distress  EYES: Eyes grossly normal to inspection.  No discharge or erythema, or obvious scleral/conjunctival abnormalities.  RESP: No audible wheeze, cough, or visible cyanosis.  No visible retractions or increased work of breathing.    SKIN: Visible skin clear. No significant rash, abnormal pigmentation or lesions.  NEURO: Cranial nerves grossly intact.  Mentation and speech appropriate for age.  PSYCH: mentation appears normal and intermittently tearful        Video-Visit Details    Type of service:  Video Visit   Start time: 1008am  End time: 1024am    Originating Location (pt. Location): Home  Distant Location (provider location):  Off-site  Platform used for Video Visit: Emeli     "

## 2023-04-25 ENCOUNTER — VIRTUAL VISIT (OUTPATIENT)
Dept: INTERNAL MEDICINE | Facility: CLINIC | Age: 58
End: 2023-04-25
Payer: COMMERCIAL

## 2023-04-25 DIAGNOSIS — G89.4 CHRONIC PAIN SYNDROME: Primary | ICD-10-CM

## 2023-04-25 PROCEDURE — 99213 OFFICE O/P EST LOW 20 MIN: CPT | Mod: VID | Performed by: INTERNAL MEDICINE

## 2023-04-25 ASSESSMENT — PATIENT HEALTH QUESTIONNAIRE - PHQ9
10. IF YOU CHECKED OFF ANY PROBLEMS, HOW DIFFICULT HAVE THESE PROBLEMS MADE IT FOR YOU TO DO YOUR WORK, TAKE CARE OF THINGS AT HOME, OR GET ALONG WITH OTHER PEOPLE: VERY DIFFICULT
SUM OF ALL RESPONSES TO PHQ QUESTIONS 1-9: 13
SUM OF ALL RESPONSES TO PHQ QUESTIONS 1-9: 13

## 2023-04-25 NOTE — NURSING NOTE
Is the patient currently in the state of [place]? YES    Visit mode:VIDEO    If the visit is dropped, the patient can be reconnected by: VIDEO VISIT: Text to cell phone: [phone]    Will anyone else be joining the visit? NO      How would you like to obtain your AVS? MyChart    Are changes needed to the allergy or medication list? NO    Reason for visit: Video Visit, Medication Follow-up, and disability      Tamika Mendieta VF

## 2023-04-25 NOTE — PATIENT INSTRUCTIONS
Either reduce the Flexeril to 5mg or stop it entirely for the morning dose for a week to see if you notice any difference in symptoms.  If symptoms worsen, then resume the 10mg dose and continue this twice per day, but I would not increase this further since you are already increasing Robaxin.  If the reduction in Flexeril in the morning doesn't worsen symptoms, then we can also try stopping/reducing at night.    Please let me know via [employer] how this is going in a week or two.    Contact the pain clinic to see if they have any suggestions for your pain prior to your appointment next month.    Work with GI for the disability application and let me know if you need any assistance from me as well.        Thank you for visiting the Primary Care Center today at the [facility]! The following is some information about our clinic:     Primary Care Center Frequently-Asked Questions    (1) How do I schedule appointments at the [facility]?     Primary Care--to schedule or make changes to an existing appointment, please call our primary care line at [phone].    Labs--to schedule a lab appointment at the [facility] you can use [employer] or call [phone]. If you have a [place] location that is closer to home, you can reach out to that location for scheduling options.     Imaging--if you need to schedule a CT, X-ray, MRI, ultrasound, or other imaging study you can call [phone] to schedule at the [facility] or any other [facility] imaging location.     Referrals--if a referral to another specialty was ordered you can expect a phone call from their scheduling team. If you have not heard from them in a week, please call us or send us a [employer] message to check the status or get a scheduling number. Please note that this only applies to internal [facility] referrals. If the referral is external you would need to contact their office for  scheduling.     (2) I have a question about my visit, who do I contact?     You can call us at the primary care line at [phone] to ask questions about your visit. You can also send a secure message through [employer], which is reviewed by clinic staff. Please note that [employer] messages have a twenty-four to forty-eight business hour turnaround time and should not be used for urgent concerns.    (3) How will I get the results of my tests?    If you are signed up for [employer] all tests will be released to you within twenty-four hours of resulting. Please allow three to five days for your doctor to review your results and place a note interpreting the results. If you do not have [employer] you will receive your results through mail seven to ten business days following the return of the tests. Please note that if there should be any urgent or concerning results that your doctor or their registered nurse will reach out to you the same day as the tests come back. If you have follow up questions about your results or would like to discuss the results in detail please schedule a follow up with your provider either in person or virtually.     (4) How do I get refills of my prescriptions?     You should always first contact your pharmacy for refills of your medications. If submitting a refill request on [employer], please be sure to submit the request only once--repeat requests can cause delays in refill. If you are requesting a NEW medication or a medication related to new symptoms you will need to schedule an appointment with a provider prior to approval. Please note: Routine medication refills have up to one to three business day turnaround whereas controlled substances refills have up to five to seven business day turnaround.    (5) I have new symptoms, what do I do?     If you are having an immediate medical emergency, you should dial 911 for assistance.   For anything urgent that needs to be seen within a few hours to one  day you should visit a local urgent care for assistance.  For non-urgent symptoms that need to be seen within a few days to a week you can schedule with an available provider in primary care by going to [employer] or calling [phone].   If you are not sure how serious your symptoms are or you would like to receive medical advice you can always call [phone] to speak with a triage nurse.  Thank you for visiting the Primary Care Center today at the [facility]! The following is some information about our clinic:     Primary Care Center Frequently-Asked Questions    (1) How do I schedule appointments at the [facility]?     Primary Care--to schedule or make changes to an existing appointment, please call our primary care line at [phone].    Labs--to schedule a lab appointment at the [facility] you can use [employer]ed or call [phone]. If you have a [place] location that is closer to home, you can reach out to that location for scheduling options.     Imaging--if you need to schedule a CT, X-ray, MRI, ultrasound, or other imaging study you can call [phone] to schedule at the [facility] or any other [facility] imaging location.     Referrals--if a referral to another specialty was ordered you can expect a phone call from their scheduling team. If you have not heard from them in a week, please call us or send us a K[employer]qed message to check the status or get a scheduling number. Please note that this only applies to internal [facility] referrals. If the referral is external you would need to contact their office for scheduling.     (2) I have a question about my visit, who do I contact?     You can call us at the primary care line at [phone] to ask questions about your visit. You can also send a secure message through [employer], which is reviewed by clinic staff. Please note that [employer] messages have a twenty-four to forty-eight  business hour turnaround time and should not be used for urgent concerns.    (3) How will I get the results of my tests?    If you are signed up for [employer] all tests will be released to you within twenty-four hours of resulting. Please allow three to five days for your doctor to review your results and place a note interpreting the results. If you do not have MyChart you will receive your results through mail seven to ten business days following the return of the tests. Please note that if there should be any urgent or concerning results that your doctor or their registered nurse will reach out to you the same day as the tests come back. If you have follow up questions about your results or would like to discuss the results in detail please schedule a follow up with your provider either in person or virtually.     (4) How do I get refills of my prescriptions?     You should always first contact your pharmacy for refills of your medications. If submitting a refill request on [employer], please be sure to submit the request only once--repeat requests can cause delays in refill. If you are requesting a NEW medication or a medication related to new symptoms you will need to schedule an appointment with a provider prior to approval. Please note: Routine medication refills have up to one to three business day turnaround whereas controlled substances refills have up to five to seven business day turnaround.    (5) I have new symptoms, what do I do?     If you are having an immediate medical emergency, you should dial 911 for assistance.   For anything urgent that needs to be seen within a few hours to one day you should visit a local urgent care for assistance.  For non-urgent symptoms that need to be seen within a few days to a week you can schedule with an available provider in primary care by going to [employer] or calling [phone].   If you are not sure how serious your symptoms are or you would like to receive medical  advice you can always call [phone] to speak with a triage nurse.

## 2023-04-26 ENCOUNTER — VIRTUAL VISIT (OUTPATIENT)
Dept: GASTROENTEROLOGY | Facility: CLINIC | Age: 58
End: 2023-04-26
Payer: COMMERCIAL

## 2023-04-26 DIAGNOSIS — K31.84 GASTROPARESIS: Primary | ICD-10-CM

## 2023-04-26 DIAGNOSIS — R19.7 DIARRHEA: ICD-10-CM

## 2023-04-26 PROCEDURE — 99215 OFFICE O/P EST HI 40 MIN: CPT | Mod: VID | Performed by: INTERNAL MEDICINE

## 2023-04-26 NOTE — NURSING NOTE
Is the patient currently in the state of [place]? YES    Visit mode:VIDEO    If the visit is dropped, the patient can be reconnected by: VIDEO VISIT: Text to cell phone: [phone]    Will anyone else be joining the visit? NO      How would you like to obtain your AVS? MyChart    Are changes needed to the allergy or medication list? NO    Reason for visit: Video Visit

## 2023-04-26 NOTE — LETTER
4/26/2023         RE: Dinora Mcghee  [address] [facility] [address]        Dear Colleague,    Thank you for referring your patient, Dinora Mcghee, to the [facility] [place]. Please see a copy of my visit note below.    Call Dr Gomez to discuss pain clinic  Consider GA procedure tube revision w contrast in lo profile G plus contrast sm bowel injection w shorter J tube.        Joined the call at 4/26/2023, 5:18:59?pm.  Left the call at 4/26/2023, 6:36:02?pm.  You were on the call for 1 hour 17 minutes 3 seconds .      Dinora is a 58-year-old woman very well-known to us remotely post TPI AT who has suffered from a unfortunate series of events with G GJ and J tubes.  We are following up by video visit to explore a variety of issues.  First she had a perforation during replacement of initially very successful surgical J-tube after a endoscopic J-tube in a different abdominal wall location caused her too much pain.  The perforation was recognized immediately and she underwent surgical repair with excision of a segment of small bowel and placement of a new feeding tube.  Unfortunately since then she has had return of intractable pain that had completely resolved with the initial surgical J-tube placement.  That pain is persisted and last week we replaced the 6 surgically placed J-tube with a 12 Bahamian KISHORE small 3 cc balloon retention tube.  Since then she has experienced ongoing pain and especially with tube feeding once it reaches the 40 cc/h level.  She has profuse diarrhea and abdominal pain.  She is very discouraged and exhausted from lack of nutrition.  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  She is unable to leave the house for any significant duration because of pain and and diarrhea.  She is discouraged to a level that I have not seen before in the last many years of helping to care for her.  To reiterate she has failed  domperidone as it made her worse and that her gastroparesis is such that she needs to vent her G-tube frequently and still vomits daily.  She was evaluated for potential  problem by Dr. Dodson but a trial of Botox injection actually made her worse and small bowel follow-through demonstrates impaired small bowel motility as well as gastric motility and our decision was not to pursue that.  Also she breaks G-tube balloons with astounding frequency sometimes every other day and has to replace them herself.  She greatly prefers low-profile 2 standard tubes but they have not been lasting more than a couple of days.  We had examined her gastric lining for any sharp protrusions that might be popping the balloons but did not see any    We spent over an hour actually 80 minutes total reviewing her entire situation with her suffering and disability and discouragement and searching for potential solutions.  #1 is she had a unsatisfactory visit with our pain clinic recently where she felt that she was not listened to.  Specifically about examining her G-tube site which was stated to be infected where is it actually is not.  The sites are fine.    Our proposed solutions are  #1 talked to her primary doctor Patricia about possibly finding a different pain clinic within or outside the [facility] system  #2 consider replacing the J-tube with a shorter tube that would reach minimally downstream from the J-tube site and presumably cause less diarrhea.  The long tube may be too close to the IC valve to allow proper absorption.  Note that [facility] has been switching her to elemental diet and she has been not using the Rella sorb.  #3 replace the low-profile G-tube with contrast-filled balloon as it may be less resistant to break if she is on a water filled balloon  #4 check C difficile when she comes for repeat procedure or sooner if that is delayed            Again, thank you for allowing me to participate in the care of your  patient.      Sincerely,    Mandeep Park MD

## 2023-04-26 NOTE — PROGRESS NOTES
Call Dr Gomez to discuss pain clinic  Consider GA procedure tube revision w contrast in lo profile G plus contrast sm bowel injection w shorter J tube.        Joined the call at 4/26/2023, 5:18:59?pm.  Left the call at 4/26/2023, 6:36:02?pm.  You were on the call for 1 hour 17 minutes 3 seconds .      Dinora is a 58-year-old woman very well-known to us remotely post TPI AT who has suffered from a unfortunate series of events with G GJ and J tubes.  We are following up by video visit to explore a variety of issues.  First she had a perforation during replacement of initially very successful surgical J-tube after a endoscopic J-tube in a different abdominal wall location caused her too much pain.  The perforation was recognized immediately and she underwent surgical repair with excision of a segment of small bowel and placement of a new feeding tube.  Unfortunately since then she has had return of intractable pain that had completely resolved with the initial surgical J-tube placement.  That pain is persisted and last week we replaced the 6 surgically placed J-tube with a 12 [place]gu[place] KISHORE small 3 cc balloon retention tube.  Since then she has experienced ongoing pain and especially with tube feeding once it reaches the 40 cc/h level.  She has profuse diarrhea and abdominal pain.  She is very discouraged and exhausted from lack of nutrition.  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  She is unable to leave the house for any significant duration because of pain and and diarrhea.  She is discouraged to a level that I have not seen before in the last many years of helping to care for her.  To reiterate she has failed domperidone as it made her worse and that her gastroparesis is such that she needs to vent her G-tube frequently and still vomits daily.  She was evaluated for potential  problem by Dr. Dodson but a trial of Botox injection actually made her worse and small bowel  follow-through demonstrates impaired small bowel motility as well as gastric motility and our decision was not to pursue that.  Also she breaks G-tube balloons with astounding frequency sometimes every other day and has to replace them herself.  She greatly prefers low-profile 2 standard tubes but they have not been lasting more than a couple of days.  We had examined her gastric lining for any sharp protrusions that might be popping the balloons but did not see any    We spent over an hour actually 80 minutes total reviewing her entire situation with her suffering and disability and discouragement and searching for potential solutions.  #1 is she had a unsatisfactory visit with our pain clinic recently where she felt that she was not listened to.  Specifically about examining her G-tube site which was stated to be infected where is it actually is not.  The sites are fine.    Our proposed solutions are  #1 talked to her primary doctor Patricia about possibly finding a different pain clinic within or outside the [facility] system  #2 consider replacing the J-tube with a shorter tube that would reach minimally downstream from the J-tube site and presumably cause less diarrhea.  The long tube may be too close to the IC valve to allow proper absorption.  Note that [facility] has been switching her to elemental diet and she has been not using the Rella sorb.  #3 replace the low-profile G-tube with contrast-filled balloon as it may be less resistant to break if she is on a water filled balloon  #4 check C difficile when she comes for repeat procedure or sooner if that is delayed

## 2023-04-26 NOTE — PROGRESS NOTES
Virtual Visit Details    Type of service:  Video Visit     Originating Location (pt. Location): Home  Distant Location (provider location):  On-site  Platform used for Video Visit: Emeli

## 2023-04-28 ENCOUNTER — PATIENT OUTREACH (OUTPATIENT)
Dept: GASTROENTEROLOGY | Facility: CLINIC | Age: 58
End: 2023-04-28
Payer: COMMERCIAL

## 2023-04-28 NOTE — TELEPHONE ENCOUNTER
Called patient to discuss plan after clinic with Dr. Park  #2 consider replacing the J-tube with a shorter tube that would reach minimally downstream from the J-tube site and presumably cause less diarrhea.  The long tube may be too close to the IC valve to allow proper absorption.  Note that [facility] has been switching her to elemental diet and she has been not using the Rella sorb.  #3 replace the low-profile G-tube with contrast-filled balloon as it may be less resistant to break if she is on a water filled balloon    Discussed plan for tube exchange on Thursday 5/4  Message sent to Dr. Park to determine plan    ML

## 2023-05-01 ENCOUNTER — PREP FOR PROCEDURE (OUTPATIENT)
Dept: GASTROENTEROLOGY | Facility: CLINIC | Age: 58
End: 2023-05-01
Payer: COMMERCIAL

## 2023-05-01 DIAGNOSIS — Z46.59 ENCOUNTER FOR CARE RELATED TO FEEDING TUBE: Primary | ICD-10-CM

## 2023-05-01 NOTE — TELEPHONE ENCOUNTER
Per Dr. Park    Please assist in scheduling:     Procedure/Imaging/Clinic: gtube exchange, jtube exchange  Physician: Dr. Park  Timin/4  Procedure length:provider average  Anesthesia:gen  Dx: encounter related to feeding tube  Tier:3  Location: [facility]     ORders placed

## 2023-05-04 ENCOUNTER — APPOINTMENT (OUTPATIENT)
Dept: GENERAL RADIOLOGY | Facility: CLINIC | Age: 58
End: 2023-05-04
Attending: INTERNAL MEDICINE
Payer: COMMERCIAL

## 2023-05-04 ENCOUNTER — HOSPITAL ENCOUNTER (OUTPATIENT)
Facility: CLINIC | Age: 58
Discharge: HOME | End: 2023-05-04
Attending: INTERNAL MEDICINE | Admitting: INTERNAL MEDICINE
Payer: COMMERCIAL

## 2023-05-04 ENCOUNTER — ANESTHESIA (OUTPATIENT)
Dept: SURGERY | Facility: CLINIC | Age: 58
End: 2023-05-04
Payer: COMMERCIAL

## 2023-05-04 ENCOUNTER — ANESTHESIA EVENT (OUTPATIENT)
Dept: SURGERY | Facility: CLINIC | Age: 58
End: 2023-05-04
Payer: COMMERCIAL

## 2023-05-04 VITALS
DIASTOLIC BLOOD PRESSURE: 100 MMHG | SYSTOLIC BLOOD PRESSURE: 142 MMHG | HEIGHT: 68 IN | HEART RATE: 100 BPM | WEIGHT: 129.19 LBS | RESPIRATION RATE: 16 BRPM | OXYGEN SATURATION: 97 % | TEMPERATURE: 97.8 F | BODY MASS INDEX: 19.58 KG/M2

## 2023-05-04 DIAGNOSIS — R10.13 EPIGASTRIC PAIN: Primary | ICD-10-CM

## 2023-05-04 DIAGNOSIS — R10.9 ACUTE POSTOPERATIVE ABDOMINAL PAIN: Primary | ICD-10-CM

## 2023-05-04 DIAGNOSIS — G89.18 ACUTE POSTOPERATIVE ABDOMINAL PAIN: Primary | ICD-10-CM

## 2023-05-04 DIAGNOSIS — R62.7 ADULT FAILURE TO THRIVE: ICD-10-CM

## 2023-05-04 DIAGNOSIS — K31.84 GASTROPARESIS: ICD-10-CM

## 2023-05-04 LAB
GLUCOSE BLDC GLUCOMTR-MCNC: 94 MG/DL (ref 70–99)
GLUCOSE BLDC GLUCOMTR-MCNC: 98 MG/DL (ref 70–99)

## 2023-05-04 PROCEDURE — 87106 FUNGI IDENTIFICATION YEAST: CPT | Performed by: INTERNAL MEDICINE

## 2023-05-04 PROCEDURE — 710N000012 HC RECOVERY PHASE 2, PER MINUTE: Performed by: INTERNAL MEDICINE

## 2023-05-04 PROCEDURE — 272N000001 HC OR GENERAL SUPPLY STERILE: Performed by: INTERNAL MEDICINE

## 2023-05-04 PROCEDURE — 710N000010 HC RECOVERY PHASE 1, LEVEL 2, PER MIN: Performed by: INTERNAL MEDICINE

## 2023-05-04 PROCEDURE — 255N000002 HC RX 255 OP 636: Performed by: INTERNAL MEDICINE

## 2023-05-04 PROCEDURE — 82962 GLUCOSE BLOOD TEST: CPT

## 2023-05-04 PROCEDURE — 250N000009 HC RX 250: Performed by: NURSE ANESTHETIST, CERTIFIED REGISTERED

## 2023-05-04 PROCEDURE — 999N000141 HC STATISTIC PRE-PROCEDURE NURSING ASSESSMENT: Performed by: INTERNAL MEDICINE

## 2023-05-04 PROCEDURE — 250N000011 HC RX IP 250 OP 636: Performed by: INTERNAL MEDICINE

## 2023-05-04 PROCEDURE — C1769 GUIDE WIRE: HCPCS | Performed by: INTERNAL MEDICINE

## 2023-05-04 PROCEDURE — 258N000003 HC RX IP 258 OP 636: Performed by: INTERNAL MEDICINE

## 2023-05-04 PROCEDURE — 370N000017 HC ANESTHESIA TECHNICAL FEE, PER MIN: Performed by: INTERNAL MEDICINE

## 2023-05-04 PROCEDURE — 250N000011 HC RX IP 250 OP 636: Performed by: NURSE ANESTHETIST, CERTIFIED REGISTERED

## 2023-05-04 PROCEDURE — 250N000011 HC RX IP 250 OP 636: Performed by: ANESTHESIOLOGY

## 2023-05-04 PROCEDURE — 999N000181 XR SURGERY CARM FLUORO GREATER THAN 5 MIN W STILLS: Mod: TC

## 2023-05-04 PROCEDURE — 250N000009 HC RX 250: Performed by: INTERNAL MEDICINE

## 2023-05-04 PROCEDURE — 360N000082 HC SURGERY LEVEL 2 W/ FLUORO, PER MIN: Performed by: INTERNAL MEDICINE

## 2023-05-04 PROCEDURE — 258N000003 HC RX IP 258 OP 636: Performed by: NURSE ANESTHETIST, CERTIFIED REGISTERED

## 2023-05-04 RX ORDER — PROCHLORPERAZINE MALEATE 10 MG
10 TABLET ORAL EVERY 6 HOURS PRN
Status: DISCONTINUED | OUTPATIENT
Start: 2023-05-04 | End: 2023-05-04 | Stop reason: HOSPADM

## 2023-05-04 RX ORDER — HYDROMORPHONE HYDROCHLORIDE 1 MG/ML
0.4 INJECTION, SOLUTION INTRAMUSCULAR; INTRAVENOUS; SUBCUTANEOUS EVERY 5 MIN PRN
Status: DISCONTINUED | OUTPATIENT
Start: 2023-05-04 | End: 2023-05-04 | Stop reason: HOSPADM

## 2023-05-04 RX ORDER — SODIUM CHLORIDE, SODIUM LACTATE, POTASSIUM CHLORIDE, CALCIUM CHLORIDE 600; 310; 30; 20 MG/100ML; MG/100ML; MG/100ML; MG/100ML
INJECTION, SOLUTION INTRAVENOUS CONTINUOUS PRN
Status: DISCONTINUED | OUTPATIENT
Start: 2023-05-04 | End: 2023-05-04

## 2023-05-04 RX ORDER — HYDROMORPHONE HYDROCHLORIDE 1 MG/ML
1 SOLUTION ORAL EVERY 6 HOURS PRN
Qty: 8 ML | Refills: 0 | Status: SHIPPED | OUTPATIENT
Start: 2023-05-04 | End: 2023-05-06

## 2023-05-04 RX ORDER — NALOXONE HYDROCHLORIDE 0.4 MG/ML
0.4 INJECTION, SOLUTION INTRAMUSCULAR; INTRAVENOUS; SUBCUTANEOUS
Status: DISCONTINUED | OUTPATIENT
Start: 2023-05-04 | End: 2023-05-04 | Stop reason: HOSPADM

## 2023-05-04 RX ORDER — HEPARIN SODIUM (PORCINE) LOCK FLUSH IV SOLN 100 UNIT/ML 100 UNIT/ML
5-10 SOLUTION INTRAVENOUS
Status: DISCONTINUED | OUTPATIENT
Start: 2023-05-04 | End: 2023-05-04 | Stop reason: HOSPADM

## 2023-05-04 RX ORDER — FENTANYL CITRATE 50 UG/ML
50 INJECTION, SOLUTION INTRAMUSCULAR; INTRAVENOUS EVERY 5 MIN PRN
Status: DISCONTINUED | OUTPATIENT
Start: 2023-05-04 | End: 2023-05-04 | Stop reason: HOSPADM

## 2023-05-04 RX ORDER — ONDANSETRON 2 MG/ML
4 INJECTION INTRAMUSCULAR; INTRAVENOUS
Status: DISCONTINUED | OUTPATIENT
Start: 2023-05-04 | End: 2023-05-04 | Stop reason: HOSPADM

## 2023-05-04 RX ORDER — IOPAMIDOL 510 MG/ML
INJECTION, SOLUTION INTRAVASCULAR PRN
Status: DISCONTINUED | OUTPATIENT
Start: 2023-05-04 | End: 2023-05-04 | Stop reason: HOSPADM

## 2023-05-04 RX ORDER — NALOXONE HYDROCHLORIDE 0.4 MG/ML
0.2 INJECTION, SOLUTION INTRAMUSCULAR; INTRAVENOUS; SUBCUTANEOUS
Status: DISCONTINUED | OUTPATIENT
Start: 2023-05-04 | End: 2023-05-04 | Stop reason: HOSPADM

## 2023-05-04 RX ORDER — LIDOCAINE 40 MG/G
CREAM TOPICAL
Status: DISCONTINUED | OUTPATIENT
Start: 2023-05-04 | End: 2023-05-04 | Stop reason: HOSPADM

## 2023-05-04 RX ORDER — FLUMAZENIL 0.1 MG/ML
0.2 INJECTION, SOLUTION INTRAVENOUS
Status: DISCONTINUED | OUTPATIENT
Start: 2023-05-04 | End: 2023-05-04 | Stop reason: HOSPADM

## 2023-05-04 RX ORDER — HALOPERIDOL 5 MG/ML
1 INJECTION INTRAMUSCULAR
Status: DISCONTINUED | OUTPATIENT
Start: 2023-05-04 | End: 2023-05-04 | Stop reason: HOSPADM

## 2023-05-04 RX ORDER — LIDOCAINE HYDROCHLORIDE 20 MG/ML
INJECTION, SOLUTION INFILTRATION; PERINEURAL PRN
Status: DISCONTINUED | OUTPATIENT
Start: 2023-05-04 | End: 2023-05-04

## 2023-05-04 RX ORDER — DIPHENHYDRAMINE HYDROCHLORIDE 50 MG/ML
INJECTION INTRAMUSCULAR; INTRAVENOUS PRN
Status: DISCONTINUED | OUTPATIENT
Start: 2023-05-04 | End: 2023-05-04

## 2023-05-04 RX ORDER — ONDANSETRON 4 MG/1
4 TABLET, ORALLY DISINTEGRATING ORAL EVERY 6 HOURS PRN
Status: DISCONTINUED | OUTPATIENT
Start: 2023-05-04 | End: 2023-05-04 | Stop reason: HOSPADM

## 2023-05-04 RX ORDER — SODIUM CHLORIDE, SODIUM LACTATE, POTASSIUM CHLORIDE, CALCIUM CHLORIDE 600; 310; 30; 20 MG/100ML; MG/100ML; MG/100ML; MG/100ML
INJECTION, SOLUTION INTRAVENOUS CONTINUOUS
Status: DISCONTINUED | OUTPATIENT
Start: 2023-05-04 | End: 2023-05-04 | Stop reason: HOSPADM

## 2023-05-04 RX ORDER — PROMETHAZINE HYDROCHLORIDE 25 MG/ML
25 INJECTION INTRAMUSCULAR; INTRAVENOUS ONCE
Status: COMPLETED | OUTPATIENT
Start: 2023-05-04 | End: 2023-05-04

## 2023-05-04 RX ORDER — FENTANYL CITRATE 50 UG/ML
25 INJECTION, SOLUTION INTRAMUSCULAR; INTRAVENOUS EVERY 5 MIN PRN
Status: DISCONTINUED | OUTPATIENT
Start: 2023-05-04 | End: 2023-05-04 | Stop reason: HOSPADM

## 2023-05-04 RX ORDER — HYDROMORPHONE HYDROCHLORIDE 1 MG/ML
0.2 INJECTION, SOLUTION INTRAMUSCULAR; INTRAVENOUS; SUBCUTANEOUS EVERY 5 MIN PRN
Status: DISCONTINUED | OUTPATIENT
Start: 2023-05-04 | End: 2023-05-04 | Stop reason: HOSPADM

## 2023-05-04 RX ORDER — HEPARIN SODIUM,PORCINE 10 UNIT/ML
5-10 VIAL (ML) INTRAVENOUS EVERY 24 HOURS
Status: DISCONTINUED | OUTPATIENT
Start: 2023-05-04 | End: 2023-05-04 | Stop reason: HOSPADM

## 2023-05-04 RX ORDER — ONDANSETRON 2 MG/ML
4 INJECTION INTRAMUSCULAR; INTRAVENOUS EVERY 6 HOURS PRN
Status: DISCONTINUED | OUTPATIENT
Start: 2023-05-04 | End: 2023-05-04 | Stop reason: HOSPADM

## 2023-05-04 RX ORDER — PROPOFOL 10 MG/ML
INJECTION, EMULSION INTRAVENOUS PRN
Status: DISCONTINUED | OUTPATIENT
Start: 2023-05-04 | End: 2023-05-04

## 2023-05-04 RX ORDER — SCOLOPAMINE TRANSDERMAL SYSTEM 1 MG/1
1 PATCH, EXTENDED RELEASE TRANSDERMAL ONCE
Status: DISCONTINUED | OUTPATIENT
Start: 2023-05-04 | End: 2023-05-04 | Stop reason: HOSPADM

## 2023-05-04 RX ORDER — HEPARIN SODIUM,PORCINE 10 UNIT/ML
5-10 VIAL (ML) INTRAVENOUS
Status: DISCONTINUED | OUTPATIENT
Start: 2023-05-04 | End: 2023-05-04 | Stop reason: HOSPADM

## 2023-05-04 RX ADMIN — LIDOCAINE HYDROCHLORIDE 100 MG: 20 INJECTION, SOLUTION INFILTRATION; PERINEURAL at 13:19

## 2023-05-04 RX ADMIN — HEPARIN SODIUM (PORCINE) LOCK FLUSH IV SOLN 100 UNIT/ML 5 ML: 100 SOLUTION at 17:28

## 2023-05-04 RX ADMIN — Medication 5 MG: at 14:09

## 2023-05-04 RX ADMIN — Medication 5 MG: at 13:58

## 2023-05-04 RX ADMIN — HYDROMORPHONE HYDROCHLORIDE 0.4 MG: 1 INJECTION, SOLUTION INTRAMUSCULAR; INTRAVENOUS; SUBCUTANEOUS at 15:47

## 2023-05-04 RX ADMIN — MIDAZOLAM 2 MG: 1 INJECTION INTRAMUSCULAR; INTRAVENOUS at 13:11

## 2023-05-04 RX ADMIN — HYDROMORPHONE HYDROCHLORIDE 0.2 MG: 1 INJECTION, SOLUTION INTRAMUSCULAR; INTRAVENOUS; SUBCUTANEOUS at 16:00

## 2023-05-04 RX ADMIN — PROPOFOL 40 MG: 10 INJECTION, EMULSION INTRAVENOUS at 13:31

## 2023-05-04 RX ADMIN — Medication 30 MG: at 13:38

## 2023-05-04 RX ADMIN — PROPOFOL 120 MG: 10 INJECTION, EMULSION INTRAVENOUS at 13:19

## 2023-05-04 RX ADMIN — PROPOFOL 150 MCG/KG/MIN: 10 INJECTION, EMULSION INTRAVENOUS at 13:20

## 2023-05-04 RX ADMIN — PROPOFOL 50 MG: 10 INJECTION, EMULSION INTRAVENOUS at 13:58

## 2023-05-04 RX ADMIN — Medication 10 MG: at 14:33

## 2023-05-04 RX ADMIN — SUGAMMADEX 120 MG: 100 INJECTION, SOLUTION INTRAVENOUS at 15:01

## 2023-05-04 RX ADMIN — SODIUM CHLORIDE, POTASSIUM CHLORIDE, SODIUM LACTATE AND CALCIUM CHLORIDE: 600; 310; 30; 20 INJECTION, SOLUTION INTRAVENOUS at 13:11

## 2023-05-04 RX ADMIN — PROMETHAZINE HYDROCHLORIDE 12.5 MG: 25 INJECTION INTRAMUSCULAR; INTRAVENOUS at 13:18

## 2023-05-04 RX ADMIN — DIPHENHYDRAMINE HYDROCHLORIDE 25 MG: 50 INJECTION, SOLUTION INTRAMUSCULAR; INTRAVENOUS at 13:18

## 2023-05-04 RX ADMIN — SUCCINYLCHOLINE CHLORIDE 100 MG: 20 INJECTION, SOLUTION INTRAMUSCULAR; INTRAVENOUS; PARENTERAL at 13:19

## 2023-05-04 RX ADMIN — HYDROMORPHONE HYDROCHLORIDE 0.4 MG: 1 INJECTION, SOLUTION INTRAMUSCULAR; INTRAVENOUS; SUBCUTANEOUS at 15:42

## 2023-05-04 ASSESSMENT — ENCOUNTER SYMPTOMS: SEIZURES: 0

## 2023-05-04 ASSESSMENT — ACTIVITIES OF DAILY LIVING (ADL)
ADLS_ACUITY_SCORE: 37
ADLS_ACUITY_SCORE: 35

## 2023-05-04 ASSESSMENT — LIFESTYLE VARIABLES: TOBACCO_USE: 1

## 2023-05-04 NOTE — ANESTHESIA PREPROCEDURE EVALUATION
Anesthesia Pre-Procedure Evaluation    Patient: Dinora Mcghee   MRN: [id number] : 1965        Procedure : Procedure(s):  Ex-lap          Past Medical History:   Diagnosis Date     Allergic rhinitis, cause unspecified      Allergy to other foods     strawberries, apples, celeries, alice, watermelon     Arthritis     left knee     Choledocholithiasis     long after cholecystectomy     Chronic abdominal pain      Chronic constipation      Chronic nausea      Chronic pancreatitis (H)      Degeneration of lumbar or lumbosacral intervertebral disc      Esophageal reflux     w/ hiatal hernia     Gastroparesis      Hiatal hernia      History of pituitary adenoma     s/p resection     Hypothyroidism      Migraines      Mild hyperlipidemia      On tube feeding diet     presence of GJ tube     Pancreatic disease     PD stricture, 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 endometriosis growth     APPENDECTOMY        SECTION       COLONOSCOPY       ENDOSCOPIC INSERTION TUBE GASTROSTOMY N/A 2021    Procedure: Gastrojejonostomy placement with jejunopexy, PEG tube exchange;  Surgeon: Zackery Montoya MD;  [facility]     ENDOSCOPIC RETROGRADE CHOLANGIOPANCREATOGRAM N/A 2015    Procedure: ENDOSCOPIC RETROGRADE CHOLANGIOPANCREATOGRAM;  Surgeon: Mandeep Park MD;  [facility]     ENDOSCOPIC RETROGRADE CHOLANGIOPANCREATOGRAM N/A 2016    Procedure: COMBINED ENDOSCOPIC RETROGRADE CHOLANGIOPANCREATOGRAPHY, PLACE TUBE/STENT;  Surgeon: Mandeep Park MD;  [facility]     ENDOSCOPIC RETROGRADE CHOLANGIOPANCREATOGRAM N/A 3/17/2016    Procedure: COMBINED ENDOSCOPIC  RETROGRADE CHOLANGIOPANCREATOGRAPHY, REMOVE FOREIGN BODY OR STENT/TUBE;  Surgeon: Mandeep Park MD;  [facility]     ENDOSCOPIC RETROGRADE CHOLANGIOPANCREATOGRAM N/A 8/2/2016    Procedure: COMBINED ENDOSCOPIC RETROGRADE CHOLANGIOPANCREATOGRAPHY, PLACE TUBE/STENT;  Surgeon: Mandeep Park MD;  [facility]     ENDOSCOPIC RETROGRADE CHOLANGIOPANCREATOGRAM N/A 8/26/2016    Procedure: COMBINED ENDOSCOPIC RETROGRADE CHOLANGIOPANCREATOGRAPHY, REMOVE FOREIGN BODY OR STENT/TUBE;  Surgeon: Mandeep Park MD;  [facility]     ENDOSCOPIC ULTRASOUND UPPER GASTROINTESTINAL TRACT (GI) N/A 10/3/2016    Procedure: ENDOSCOPIC ULTRASOUND, ESOPHAGOSCOPY / UPPER GASTROINTESTINAL TRACT (GI);  Surgeon: Guru Jose Rodas MD;  [facility]     ENTEROSCOPY SMALL BOWEL N/A 2/3/2022    Procedure: ENTEROSCOPY with possible fistula closure;  Surgeon: Francisco Dodson MD;  Location:  GI     ESOPHAGOSCOPY, GASTROSCOPY, DUODENOSCOPY (EGD), COMBINED N/A 6/24/2015    Procedure: COMBINED ESOPHAGOSCOPY, GASTROSCOPY, DUODENOSCOPY (EGD), REMOVE FOREIGN BODY;  Surgeon: Mandeep Park MD;  [facility]     ESOPHAGOSCOPY, GASTROSCOPY, DUODENOSCOPY (EGD), COMBINED N/A 10/25/2015    Procedure: COMBINED ESOPHAGOSCOPY, GASTROSCOPY, DUODENOSCOPY (EGD);  Surgeon: Sammy Amaro MD;  [facility]     ESOPHAGOSCOPY, GASTROSCOPY, DUODENOSCOPY (EGD), COMBINED N/A 10/25/2015    Procedure: COMBINED ESOPHAGOSCOPY, GASTROSCOPY, DUODENOSCOPY (EGD), BIOPSY SINGLE OR MULTIPLE;  Surgeon: Sammy Amaro MD;  Location:  GI     ESOPHAGOSCOPY, GASTROSCOPY, DUODENOSCOPY (EGD), COMBINED N/A 1/31/2023    Procedure: ESOPHAGOGASTRODUODENOSCOPY (EGD) with peg replacement ;  Surgeon: Mandeep Park MD;  [facility]     ESOPHAGOSCOPY, GASTROSCOPY, DUODENOSCOPY (EGD), DILATATION, COMBINED       EXCISE LESION TRUNK N/A 4/17/2017    Procedure: EXCISE LESION TRUNK;  Removal of Abdominal  Foreign Body;  Surgeon: Nestor Phoenix MD;  [facility]     HC ESOPH/GAS REFLUX TEST W NASAL IMPED >1 HR N/A 11/19/2015    Procedure: ESOPHAGEAL IMPEDENCE FUNCTION TEST WITH 24 HOUR PH GREATER THAN 1 HOUR;  Surgeon: Thiago Apple MD;  [facility]     HC UGI ENDOSCOPY DIAG W BIOPSY  9/17/08     HC UGI ENDOSCOPY DIAG W BIOPSY  9/27/12     HC UGI ENDOSCOPY W ESOPHAGEAL DILATION BALLOON <30MM  9/17/08     HC UGI ENDOSCOPY W EUS N/A 5/5/2015    Procedure: COMBINED ENDOSCOPIC ULTRASOUND, ESOPHAGOSCOPY, GASTROSCOPY, DUODENOSCOPY (EGD);  Surgeon: Wm Dueñas MD;  [facility]     HC WRIST ARTHROSCOP,RELEASE XVERS LIG Bilateral 12/17/08     INJECT TRANSVERSUS ABDOMINIS PLANE (TAP) BLOCK BILATERAL Left 9/22/2016    Procedure: INJECT TRANSVERSUS ABDOMINIS PLANE (TAP) BLOCK BILATERAL;  Surgeon: Dickson Corrigan MD;  [facility]     INJECT TRIGGER POINT Bilateral 9/8/2022    Procedure: Abdominal trigger point injection with ultrasound;  Surgeon: Monika Mahajan MD;  [facility]     INJECT TRIGGER POINT SINGLE / MULTIPLE 3 OR MORE MUSCLES Right 11/15/2022    Procedure: Trigger point injections right abdomen with ultrasound guidance;  Surgeon: Monika Mahajan MD;  [facility]     IR CHEST PORT PLACEMENT < 5 YRS OF AGE  6/10/2022     laparoscopic pineda  1995     LAPAROSCOPIC HERNIORRHAPHY INCISIONAL N/A 8/23/2018    Procedure: LAPAROSCOPIC HERNIORRHAPHY INCISIONAL;  Laparoscopic Incisional Hernia Repair with Symbotex Mesh Implant;  Surgeon: Nestor Phoenix MD;  [facility]     LAPAROSCOPIC PANCREATECTOMY, TRANSPLANT AUTO ISLET CELL N/A 12/28/2016    Procedure: LAPAROSCOPIC PANCREATECTOMY, TRANSPLANT AUTO ISLET CELL;  Surgeon: Nestor Phoenix MD;  [facility]     LAPAROTOMY EXPLORATORY N/A 1/31/2023    Procedure: Exploratory Laparotomy, lysis of adhesions, Perforated J-Junostomy Resection, Replace J-Junostomy site;  Surgeon: Elvre Bauer MD;  [facility]      LAPAROTOMY, LYSIS ADHESIONS, COMBINED N/A 2023    Procedure: Dilatation of jejunostomy feeding tube, track with placement of jejunostomy tube with fluoroscopy;  Surgeon: Elver Bauer MD;  [facility]     REMOVE AND REPLACE BREAST IMPLANT PROSTHESIS N/A 2022    Procedure: Exploratory Laparotomy, lysis of adhesions, small bowel resection. Placement of gastric jejunostomy for enteral feeding.;  Surgeon: Elver Bauer MD;  [facility]     REMOVE GASTROSTOMY TUBE ADULT N/A 2022    Procedure: REMOVAL, GASTROSTOMY TUBE, ADULT;  Surgeon: Mandeep Park MD;  [facility]     REPLACE GASTROJEJUNOSTOMY TUBE, PERCUTANEOUS N/A 2021    Procedure: GASTROJEJUNOSTOMY TUBE PLACEMENT, PERCUTANEOUS, WITH GASTROPEXY;  Surgeon: Mandeep Park MD;  [facility]     REPLACE GASTROJEJUNOSTOMY TUBE, PERCUTANEOUS N/A 2021    Procedure: REPLACEMENT, GASTROJEJUNOSTOMY TUBE, PERCUTANEOUS;  Surgeon: Zackery Montoya MD;  [facility]     REPLACE GASTROJEJUNOSTOMY TUBE, PERCUTANEOUS N/A 2022    Procedure: REPLACEMENT, GASTROJEJUNOSTOMY TUBE, PERCUTANEOUS;  Surgeon: Mandeep Park MD;  [facility]     REPLACE GASTROJEJUNOSTOMY TUBE, PERCUTANEOUS N/A 2022    Procedure: REPLACEMENT, GASTROJEJUNOSTOMY TUBE, PERCUTANEOUS with ENDOSCOPIC SUTURING.;  Surgeon: Mandeep Park MD;  [facility]     REPLACE JEJUNOSTOMY TUBE, PERCUTANEOUS N/A 9/10/2021    Procedure: UPPER ENDOSCOPY, REPLACEMENT OF PERCUTANEOUS GASTROJEJUNOSTOMY TUBE, T-TAG GASTROPEXY;  Surgeon: Zackery Montoya MD;  [facility]     REPLACE JEJUNOSTOMY TUBE, PERCUTANEOUS N/A 2021    Procedure: REPLACEMENT, JEJUNOSTOMY TUBE, PERCUTANEOUS;  Surgeon: Mandeep Park MD;  [facility]     transphenoidal pituitary resection       ZC  DELIVERY ONLY       ZZ  DELIVERY ONLY      repeat c section with incidental cystotomy with  repair     ZZC EXCIS PITUITARY,TRANSNASAL/SEPTAL      pituitary tumor removed for diabetes insipidus     ZZC TOTAL ABDOM HYSTERECTOMY      w/ bilateral salpingoophorectomy       Allergies   Allergen Reactions     Apple Juice Anaphylaxis     Corticosteroids Other (See Comments)     All oral, IV and injectable steroids are contraindicated (unless in life threatening situations) in Islet Auto transplant recipients. They can cause irreversible loss of islet cell function. Please contact patient's transplant care coordinator ADI Gaffney RN at [phone]/pager [phone] and/or endocrinologist prior to administration.       Depakote [Divalproex Sodium] Other (See Comments)     Chest pain     Zithromax [Azithromycin Dihydrate] Anaphylaxis     Noted in 08 ER     Bromfenac      Other reaction(s): Headache     Codeine      Other reaction(s): Hallucinations     Darvocet [Propoxyphene N-Apap] Itching     Morphine Nausea and Vomiting and Rash     Nalbuphine Hcl Rash     RASH :nubaine     Zosyn [Piperacillin-Tazobactam In Dex] Rash     Possible allergy, did have a diffuse rash that seemed drug related but could have also been related to soap in the hospital.      Bactrim [Sulfamethoxazole W-Trimethoprim] Other (See Comments) and Nausea and Vomiting     Severely low liver function.     Statins Other (See Comments)     Previous liver issues, risks vs benefits felt to not warrant statin.  Discussed Oct 2022 visit     Tape [Adhesive Tape] Blisters     Tramadol      Zofran [Ondansetron] Other (See Comments)     migraine     Flagyl [Metronidazole] Hives and Rash      Social History     Tobacco Use     Smoking status: Former     Packs/day: 0.50     Years: 6.00     Pack years: 3.00     Types: Cigarettes     Start date: 1985     Quit date: 1992     Years since quittin.3     Smokeless tobacco: Not on file     Tobacco comments:     no 2nd hand   Vaping Use     Vaping status: Not on file   Substance Use Topics      Alcohol use: Not Currently     Alcohol/week: 3.0 - 6.0 standard drinks of alcohol     Types: 1 - 2 Glasses of wine, 1 - 2 Cans of beer, 1 - 2 Shots of liquor per week     Comment: none since [facility]      Wt Readings from Last 1 Encounters:   05/04/23 58.6 kg (129 lb 3 oz)        Facility-Administered Medications Prior to Admission   Medication Dose Route Frequency Provider Last Rate Last Admin     Botulinum Toxin Type A (BOTOX) 200 units injection 155 Units  155 Units Intramuscular See Admin Instructions Rachelle العلي APRN CNP         Botulinum Toxin Type A (BOTOX) 200 units injection 155 Units  155 Units Intramuscular See Admin Instructions Rachelle العلي APRN CNP   155 Units at 03/23/23 1300     Medications Prior to Admission   Medication Sig Dispense Refill Last Dose     acetaminophen (TYLENOL) 325 MG/10.15ML solution 20.3 mLs (650 mg) by Per J Tube route every 6 hours as needed for mild pain or fever 473 mL 4      amylase-lipase-protease (CREON) 38711-09945 units CPEP per EC capsule Take 3-4 capsules by mouth 3 times daily (with meals) With oral meals 150 capsule 11      cyclobenzaprine (FLEXERIL) 10 MG tablet 1 tablet (10 mg) by Per G Tube route 2 times daily as needed for muscle spasms 60 tablet 11      diphenhydrAMINE (BENADRYL ALLERGY) 25 MG capsule Take 1 capsule (25 mg) by mouth every 6 hours as needed for itching or allergies 56 capsule 0      estradiol (VAGIFEM) 10 MCG TABS vaginal tablet INSERT 1 TABLET(10 MCG) VAGINALLY 2 TIMES A WEEK 24 tablet 2      famotidine (PEPCID) 40 MG/5ML suspension 2.5 mLs (20 mg) by Per J Tube route daily 50 mL 4      LACTATED RINGERS IV Inject 1 L into the vein daily        Lansoprazole (PREVACID IV) Inject into the vein daily        levothyroxine (SYNTHROID/LEVOTHROID) 137 MCG tablet 137 mcg by Per G Tube route daily        Lidocaine (LIDOCARE) 4 % Patch Place 1 patch onto the skin every 24 hours Apply patch to abdomen once daily as  needed. Remove after 12 hours. To prevent lidocaine toxicity, should be patch free for 12 hrs daily. 15 patch 0      methocarbamol (ROBAXIN) 750 MG tablet 1 tablet (750 mg) by Per Feeding Tube route 4 times daily 120 tablet 3      montelukast (SINGULAIR) 10 MG tablet 10 mg by Per G Tube route At Bedtime        Nutritional Supplements (BOOST HIGH PROTEIN) LIQD After above baseline labs are drawn, give: 6 mL/kg to maximum of 360 mL; the beverage is to be consumed within 5 minutes.  0      prochlorperazine (COMPAZINE) 10 MG tablet TAKE 1/2 TABLET(5 MG) BY MOUTH EVERY 6 HOURS AS NEEDED FOR NAUSEA OR VOMITING (Patient taking differently: 20 mg) 60 tablet 2      promethazine-phenylephrine (PROMETHAZINE VC) 6.25-5 MG/5ML syrup 20 mLs (25 mg) by Per Feeding Tube route nightly as needed for nausea - Per Feeding Tube 473 mL 3      scopolamine (TRANSDERM) 1 MG/3DAYS 72 hr patch Place 1 patch onto the skin every 72 hours 10 patch 11      sennosides (SENOKOT) 8.8 MG/5ML syrup 5 mLs by Per J Tube route 2 times daily 236 mL 11      ZOLMitriptan (ZOMIG-ZMT) 5 MG ODT Take 1 tablet (5 mg) by mouth at onset of headache for migraine May repeat in 2 hours. Max 2 tablets/24 hours. 18 tablet 6      acarbose (PRECOSE) 100 MG tablet Take 1 tablet (100 mg) by mouth 3 times daily (with meals) 90 tablet 3      Acetaminophen Childrens 160 MG/5ML SUSP TAKE 20.312ML (650MG) BY MOUTH EVERY 6 HOURS AS NEEDED FOR FEVER OR MILD PAIN 473 mL 4      blood glucose (PAT CONTOUR NEXT) test strip Use to test blood sugar 6 times daily or as directed. 2 Box 11      blood glucose monitoring (PAT MICROLET) lancets Use to test blood sugar 6-8 times daily or as directed. 100 each 11      Continuous Blood Gluc Sensor (FREESTYLE LISA 2 SENSOR) [facility] USE ONE SENSOR ONCE EVERY 14 DAYS 2 each 11      glucagon 1 MG kit Give 0.1 to 0.15mg( 10-15 units on Insulin sryinge) subcutaneous  every 15 minutes PRN for hypoglycemia. Remix new kit q24hr. Needs up to 3  "kit/week. 10 each 3      glucose 40 % GEL gel Take 15-30 g by mouth every 15 minutes as needed for low blood sugar 3 Tube 2      ibuprofen (ADVIL/MOTRIN) 400 MG tablet Take 1 tablet (400 mg) by mouth every 6 hours as needed for moderate pain 30 tablet 0      insulin syringe-needle U-100 (30G X 1/2\" 0.3 ML) 30G X 1/2\" 0.3 ML miscellaneous Use 3 syringes daily or as directed. 50 each 1      scopolamine (TRANSDERM) 1 MG/3DAYS 72 hr patch Place 1 patch onto the skin every 72 hours 10 patch 11      Sharps Container (BD SHARPS ) MISC 1 Container as needed 1 each 1      STATIN NOT PRESCRIBED (INTENTIONAL) Previous liver issues, risks vs benefits felt to not warrant statin.    Discussed Oct 2022 visit        zinc oxide - white petrolatum (CRITIC-AID THICK MOIST BARRIER) 20-51% PSTE topical paste Apply 71 g topically every hour as needed for rash (Under J tube bumper when needed for skin protection) 170 g 0        Anesthesia Evaluation   Pt has had prior anesthetic. Type: General.    History of anesthetic complications  - PONV.      ROS/MED HX  ENT/Pulmonary: Comment: 3 pk yr hx, quit 1992    (+) allergic rhinitis, tobacco use, Past use,  (-) asthma and sleep apnea   Neurologic:     (+) migraines,  (-) no seizures and no CVA   Cardiovascular:     (+) Dyslipidemia -----  Echo: Date: Results:    Stress Test: Date: Results:    ECG Reviewed: Date: Results:      Cath: Date: Results:      METS/Exercise Tolerance: 4 - Raking leaves, gardening Comment: Endorses SOB when carrying laundry upstairs. Denies CP. +Fatigue, less stamina 2/2 malnutrition     Hematologic:  - neg hematologic  ROS  (-) history of blood clots and history of blood transfusion   Musculoskeletal: Comment: lumbago, chrondromalacia, stiff shoulder  - neg musculoskeletal ROS     GI/Hepatic: Comment:     Chronic pancreatitis    Severe gastroparesis    Malnutrition, wt loss  Hiatal hernia      (+) GERD, Symptomatic, hiatal hernia,     Renal/Genitourinary:  - " "neg Renal ROS     Endo: Comment: Auto islet cell transplant; post-pancreatectomy enzyme deficiency    (+) thyroid problem, hypothyroidism,     Psychiatric/Substance Use:  - neg psychiatric ROS     Infectious Disease:  - neg infectious disease ROS     Malignancy:  - neg malignancy ROS (+) Malignancy (pituitary adenoma), History of Neuro.    Other:  - neg other ROS        /87 (BP Location: Left arm)   Pulse 104   Temp 36.4  C (97.6  F) (Oral)   Resp 20   Ht 1.727 m (5' 8\")   Wt 58.6 kg (129 lb 3 oz)   SpO2 99%   BMI 19.64 kg/m      Physical Exam    Airway        Mallampati: II   TM distance: > 3 FB   Neck ROM: full   Mouth opening: > 3 cm    Respiratory Devices and Support         Dental       (+) Minor Abnormalities - some fillings, tiny chips      Cardiovascular          Rhythm and rate: regular and normal     Pulmonary           breath sounds clear to auscultation               CBC:   Lab Results   Component Value Date    WBC 11.0 02/03/2023    WBC 14.2 (H) 02/02/2023    HGB 11.7 02/03/2023    HGB 12.5 02/02/2023    HCT 36.3 02/03/2023    HCT 38.2 02/02/2023     02/03/2023     02/02/2023     BMP:   Lab Results   Component Value Date     02/03/2023     02/01/2023    POTASSIUM 3.8 02/05/2023    POTASSIUM 3.7 02/04/2023    CHLORIDE 101 02/03/2023    CHLORIDE 100 02/01/2023    CO2 28 02/03/2023    CO2 29 02/01/2023    BUN 6.0 02/03/2023    BUN 7.2 02/01/2023    CR 0.46 (L) 02/03/2023    CR 0.53 02/01/2023    GLC 94 05/04/2023    GLC 87 04/11/2023     COAGS:   Lab Results   Component Value Date    PTT 29 01/07/2017    INR 1.03 06/10/2022    FIBR 225 12/28/2016     POC:   Lab Results   Component Value Date    BGM 85 08/24/2018    HCG Negative 12/19/2016     HEPATIC:   Lab Results   Component Value Date    ALBUMIN 4.2 02/17/2022    PROTTOTAL 7.8 02/17/2022    ALT 31 02/17/2022    AST 23 02/17/2022    ALKPHOS 98 02/17/2022    BILITOTAL 0.9 02/17/2022     OTHER:   Lab Results "   Component Value Date    PH 7.49 (H) 12/28/2016    LACT 0.9 12/03/2021    A1C 5.4 02/17/2022    KASSI 8.7 02/03/2023    PHOS 3.3 02/05/2023    MAG 1.7 02/05/2023    LIPASE <10 (L) 12/03/2021    AMYLASE 45 01/23/2017    TSH 0.99 03/06/2023    T4 1.13 03/23/2018    CRP 90.0 (H) 12/29/2016    SED 10 09/16/2021         Anesthesia Plan    ASA Status:  3   NPO Status:  NPO Appropriate    Anesthesia Type: General.     - Airway: ETT   Induction: RSI, Propofol, Intravenous.   Maintenance: TIVA.        Consents    Anesthesia Plan(s) and associated risks, benefits, and realistic alternatives discussed. Questions answered and patient/representative(s) expressed understanding.     - Discussed: Risks, Benefits and Alternatives for BOTH SEDATION and the PROCEDURE were discussed     - Discussed with:  Patient         Postoperative Care    Pain management: IV analgesics, Oral pain medications.   PONV prophylaxis: Promethazine or metoclopramide, Background Propofol Infusion, Scopolamine patch     Comments:    Other Comments: No steroids due to islet cell transplant.  Patient is intolerant of zofran.  Requests phenergan, benadryl, and scope patch for PONV prevention. TIVA.       H&P reviewed: Unable to attach H&P to encounter due to EHR limitations. H&P Update: appropriate H&P reviewed, patient examined. No interval changes since H&P (within 30 days).             Sebastian Godfrey MD

## 2023-05-04 NOTE — ANESTHESIA CARE TRANSFER NOTE
Patient: Dinora Mcghee    Procedure: Procedure(s):  Esophagastroduodenoscopy REPLACEMENT, GASTROSTOMY TUBE, WITHOUT ENDOSCOPY  REPLACEMENT, JEJUNOSTOMY TUBE, PERCUTANEOUS       Diagnosis: Encounter for care related to feeding tube [Z46.59]  Diagnosis Additional Information: No value filed.    Anesthesia Type:   General     Note:    Oropharynx: oropharynx clear of all foreign objects  Level of Consciousness: awake  Oxygen Supplementation: room air    Independent Airway: airway patency satisfactory and stable  Dentition: dentition unchanged  Vital Signs Stable: post-procedure vital signs reviewed and stable  Report to RN Given: handoff report given  Patient transferred to: PACU    Handoff Report: Identifed the Patient, Identified the Reponsible Provider, Reviewed the pertinent medical history, Discussed the surgical course, Reviewed Intra-OP anesthesia mangement and issues during anesthesia, Set expectations for post-procedure period and Allowed opportunity for questions and acknowledgement of understanding      Vitals:  Vitals Value Taken Time   /79 05/04/23 1515   Temp     Pulse 73 05/04/23 1515   Resp 0 05/04/23 1515   SpO2 100 % 05/04/23 1515   Vitals shown include unvalidated device data.    Electronically Signed By: NATALY Tripp CRNA  May 4, 2023  3:17 PM

## 2023-05-04 NOTE — ANESTHESIA POSTPROCEDURE EVALUATION
Patient: Dinora Mcghee    Procedure: Procedure(s):  Esophagastroduodenoscopy REPLACEMENT, GASTROSTOMY TUBE, WITHOUT ENDOSCOPY  REPLACEMENT, JEJUNOSTOMY TUBE, PERCUTANEOUS       Anesthesia Type:  General    Note:  Disposition: Outpatient   Postop Pain Control: Uneventful            Sign Out: Well controlled pain   PONV: No   Neuro/Psych: Uneventful            Sign Out: Acceptable/Baseline neuro status   Airway/Respiratory: Uneventful            Sign Out: Acceptable/Baseline resp. status   CV/Hemodynamics: Uneventful            Sign Out: Acceptable CV status; No obvious hypovolemia; No obvious fluid overload   Other NRE: NONE   DID A NON-ROUTINE EVENT OCCUR? No           Last vitals:  Vitals Value Taken Time   /85 05/04/23 1600   Temp 36.6  C (97.8  F) 05/04/23 1510   Pulse 92 05/04/23 1610   Resp 10 05/04/23 1610   SpO2 96 % 05/04/23 1610   Vitals shown include unvalidated device data.    Electronically Signed By: Sinan Devries MD  May 4, 2023  4:11 PM

## 2023-05-04 NOTE — DISCHARGE INSTRUCTIONS
[facility]  Same-Day Surgery   Adult Discharge Orders & Instructions     For 24 hours after surgery    Get plenty of rest.  A responsible adult must stay with you for at least 24 hours after you leave the hospital.   Do not drive or use heavy equipment.  If you have weakness or tingling, don't drive or use heavy equipment until this feeling goes away.  Do not drink alcohol.  Avoid strenuous or risky activities.  Ask for help when climbing stairs.   You may feel lightheaded.  IF so, sit for a few minutes before standing.  Have someone help you get up.   If you have nausea (feel sick to your stomach): Drink only clear liquids such as apple juice, ginger ale, broth or 7-Up.  Rest may also help.  Be sure to drink enough fluids.  Move to a regular diet as you feel able.  You may have a slight fever. Call the doctor if your fever is over 100 F (37.7 C) (taken under the tongue) or lasts longer than 24 hours.  You may have a dry mouth, a sore throat, muscle aches or trouble sleeping.  These should go away after 24 hours.  Do not make important or legal decisions.   Call your doctor for any of the followin.  Signs of infection (fever, growing tenderness at the surgery site, a large amount of drainage or bleeding, severe pain, foul-smelling drainage, redness, swelling).    2. It has been over 8 to 10 hours since surgery and you are still not able to urinate (pass water).    3.  Headache for over 24 hours.      To contact a doctor, call Mandeep Park MD at [phone] or:    '   [phone] and ask for the resident on call for   Gastroenterology (answered 24 hours a day)  '   Emergency Department:    [facility]: [phone]       (TTY for hearing impaired: [phone])    [facility]: [phone]       (TTY for hearing impaired: [phone])

## 2023-05-04 NOTE — ANESTHESIA PROCEDURE NOTES
Airway       Patient location during procedure: OR       Procedure Start/Stop Times: 5/4/2023 1:18 PM and 5/4/2023 1:20 PM  Staff -        CRNA: Stanley Ibrahim APRN CRNA       Performed By: CRNA  Consent for Airway        Urgency: elective  Indications and Patient Condition       Indications for airway management: andrez-procedural       Induction type:RSI       Mask difficulty assessment: 0 - not attempted    Final Airway Details       Final airway type: endotracheal airway       Successful airway: ETT - single  Endotracheal Airway Details        ETT size (mm): 7.0       Cuffed: yes       Successful intubation technique: direct laryngoscopy       DL Blade Type: Pino 2       Grade View of Cords: 1       Adjucts: stylet       Position: Right       Measured from: gums/teeth       Secured at (cm): 21       Bite block used: None    Post intubation assessment        Placement verified by: capnometry, equal breath sounds and chest rise        Number of attempts at approach: 1       Number of other approaches attempted: 0       Secured with: pink tape       Ease of procedure: easy       Dentition: Intact    Medication(s) Administered   Medication Administration Time: 5/4/2023 1:18 PM

## 2023-05-04 NOTE — PROGRESS NOTES
Per Dr. Park  Patient needs short term TPN in addition to minimal tube feeds as tolerated, related to poor feed tolerance and weight loss.     Orders sent to home infusion.    ML

## 2023-05-04 NOTE — H&P
Preop    No new events other than weight loss, pain w feeding    PEX  CV no mrg  Lungs clear to P and A  Abd tender at G and J tube sites, no infection  Ext no CCE    Past Medical, Surgical, Family, and Social Histories:    Past Medical History:   Diagnosis Date     Allergic rhinitis, cause unspecified      Allergy to other foods     strawberries, apples, celeries, alice, watermelon     Arthritis     left knee     Choledocholithiasis     long after cholecystectomy     Chronic abdominal pain      Chronic constipation      Chronic nausea      Chronic pancreatitis (H)      Degeneration of lumbar or lumbosacral intervertebral disc      Esophageal reflux     w/ hiatal hernia     Gastroparesis      Hiatal hernia      History of pituitary adenoma     s/p resection     Hypothyroidism      Migraines      Mild hyperlipidemia      On tube feeding diet     presence of GJ tube     Pancreatic disease     PD stricture, 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 endometriosis growth     APPENDECTOMY        SECTION       COLONOSCOPY       ENDOSCOPIC INSERTION TUBE GASTROSTOMY N/A 2021    Procedure: Gastrojejonostomy placement with jejunopexy, PEG tube exchange;  Surgeon: Zackery Montoya MD;  [facility]     ENDOSCOPIC RETROGRADE CHOLANGIOPANCREATOGRAM N/A 2015    Procedure: ENDOSCOPIC RETROGRADE CHOLANGIOPANCREATOGRAM;  Surgeon: Mandeep Park MD;  [facility]     ENDOSCOPIC RETROGRADE CHOLANGIOPANCREATOGRAM N/A 2016    Procedure: COMBINED ENDOSCOPIC RETROGRADE CHOLANGIOPANCREATOGRAPHY, PLACE TUBE/STENT;  Surgeon: Mandeep Park MD;  [facility]     ENDOSCOPIC  RETROGRADE CHOLANGIOPANCREATOGRAM N/A 3/17/2016    Procedure: COMBINED ENDOSCOPIC RETROGRADE CHOLANGIOPANCREATOGRAPHY, REMOVE FOREIGN BODY OR STENT/TUBE;  Surgeon: Mandeep Park MD;  [facility]     ENDOSCOPIC RETROGRADE CHOLANGIOPANCREATOGRAM N/A 8/2/2016    Procedure: COMBINED ENDOSCOPIC RETROGRADE CHOLANGIOPANCREATOGRAPHY, PLACE TUBE/STENT;  Surgeon: Mandeep Park MD;  [facility]     ENDOSCOPIC RETROGRADE CHOLANGIOPANCREATOGRAM N/A 8/26/2016    Procedure: COMBINED ENDOSCOPIC RETROGRADE CHOLANGIOPANCREATOGRAPHY, REMOVE FOREIGN BODY OR STENT/TUBE;  Surgeon: Mandeep Park MD;  [facility]     ENDOSCOPIC ULTRASOUND UPPER GASTROINTESTINAL TRACT (GI) N/A 10/3/2016    Procedure: ENDOSCOPIC ULTRASOUND, ESOPHAGOSCOPY / UPPER GASTROINTESTINAL TRACT (GI);  Surgeon: Guru Jose Rodas MD;  [facility]     ENTEROSCOPY SMALL BOWEL N/A 2/3/2022    Procedure: ENTEROSCOPY with possible fistula closure;  Surgeon: Francisco Dodson MD;  Location:  GI     ESOPHAGOSCOPY, GASTROSCOPY, DUODENOSCOPY (EGD), COMBINED N/A 6/24/2015    Procedure: COMBINED ESOPHAGOSCOPY, GASTROSCOPY, DUODENOSCOPY (EGD), REMOVE FOREIGN BODY;  Surgeon: Mandeep Park MD;  Location:  GI     ESOPHAGOSCOPY, GASTROSCOPY, DUODENOSCOPY (EGD), COMBINED N/A 10/25/2015    Procedure: COMBINED ESOPHAGOSCOPY, GASTROSCOPY, DUODENOSCOPY (EGD);  Surgeon: Sammy Amaro MD;  Location:  GI     ESOPHAGOSCOPY, GASTROSCOPY, DUODENOSCOPY (EGD), COMBINED N/A 10/25/2015    Procedure: COMBINED ESOPHAGOSCOPY, GASTROSCOPY, DUODENOSCOPY (EGD), BIOPSY SINGLE OR MULTIPLE;  Surgeon: Sammy Amaro MD;  Location:  GI     ESOPHAGOSCOPY, GASTROSCOPY, DUODENOSCOPY (EGD), COMBINED N/A 1/31/2023    Procedure: ESOPHAGOGASTRODUODENOSCOPY (EGD) with peg replacement ;  Surgeon: Mandeep Park MD;  [facility]     ESOPHAGOSCOPY, GASTROSCOPY, DUODENOSCOPY (EGD), DILATATION, COMBINED       EXCISE LESION  TRUNK N/A 4/17/2017    Procedure: EXCISE LESION TRUNK;  Removal of Abdominal Foreign Body;  Surgeon: Nestor Phoenix MD;  [facility]     HC ESOPH/GAS REFLUX TEST W NASAL IMPED >1 HR N/A 11/19/2015    Procedure: ESOPHAGEAL IMPEDENCE FUNCTION TEST WITH 24 HOUR PH GREATER THAN 1 HOUR;  Surgeon: Thiago Apple MD;  [facility]     HC UGI ENDOSCOPY DIAG W BIOPSY  9/17/08     HC UGI ENDOSCOPY DIAG W BIOPSY  9/27/12     HC UGI ENDOSCOPY W ESOPHAGEAL DILATION BALLOON <30MM  9/17/08     HC UGI ENDOSCOPY W EUS N/A 5/5/2015    Procedure: COMBINED ENDOSCOPIC ULTRASOUND, ESOPHAGOSCOPY, GASTROSCOPY, DUODENOSCOPY (EGD);  Surgeon: Wm Dueñas MD;  [facility]     HC WRIST ARTHROSCOP,RELEASE XVERS LIG Bilateral 12/17/08     INJECT TRANSVERSUS ABDOMINIS PLANE (TAP) BLOCK BILATERAL Left 9/22/2016    Procedure: INJECT TRANSVERSUS ABDOMINIS PLANE (TAP) BLOCK BILATERAL;  Surgeon: Dickson Corrigan MD;  [facility]     INJECT TRIGGER POINT Bilateral 9/8/2022    Procedure: Abdominal trigger point injection with ultrasound;  Surgeon: Monika Mahajan MD;  [facility]     INJECT TRIGGER POINT SINGLE / MULTIPLE 3 OR MORE MUSCLES Right 11/15/2022    Procedure: Trigger point injections right abdomen with ultrasound guidance;  Surgeon: Monika Mahajan MD;  Location: [facility] OR     IR CHEST PORT PLACEMENT < 5 YRS OF AGE  6/10/2022     laparoscopic pineda  1995     LAPAROSCOPIC HERNIORRHAPHY INCISIONAL N/A 8/23/2018    Procedure: LAPAROSCOPIC HERNIORRHAPHY INCISIONAL;  Laparoscopic Incisional Hernia Repair with Symbotex Mesh Implant;  Surgeon: Nestor Phoenix MD;  [facility]     LAPAROSCOPIC PANCREATECTOMY, TRANSPLANT AUTO ISLET CELL N/A 12/28/2016    Procedure: LAPAROSCOPIC PANCREATECTOMY, TRANSPLANT AUTO ISLET CELL;  Surgeon: Nestor Phoenix MD;  [facility]     LAPAROTOMY EXPLORATORY N/A 1/31/2023    Procedure: Exploratory Laparotomy, lysis of adhesions, Perforated J-Junostomy Resection, Replace  J-Junostomy site;  Surgeon: Elver Bauer MD;  [facility]     LAPAROTOMY, LYSIS ADHESIONS, COMBINED N/A 2023    Procedure: Dilatation of jejunostomy feeding tube, track with placement of jejunostomy tube with fluoroscopy;  Surgeon: Elver Bauer MD;  [facility]     REMOVE AND REPLACE BREAST IMPLANT PROSTHESIS N/A 2022    Procedure: Exploratory Laparotomy, lysis of adhesions, small bowel resection. Placement of gastric jejunostomy for enteral feeding.;  Surgeon: Elver Bauer MD;  [facility]     REMOVE GASTROSTOMY TUBE ADULT N/A 2022    Procedure: REMOVAL, GASTROSTOMY TUBE, ADULT;  Surgeon: Mandeep Park MD;  [facility]     REPLACE GASTROJEJUNOSTOMY TUBE, PERCUTANEOUS N/A 2021    Procedure: GASTROJEJUNOSTOMY TUBE PLACEMENT, PERCUTANEOUS, WITH GASTROPEXY;  Surgeon: Mandeep Park MD;  [facility]     REPLACE GASTROJEJUNOSTOMY TUBE, PERCUTANEOUS N/A 2021    Procedure: REPLACEMENT, GASTROJEJUNOSTOMY TUBE, PERCUTANEOUS;  Surgeon: Zackery Montoya MD;  [facility]     REPLACE GASTROJEJUNOSTOMY TUBE, PERCUTANEOUS N/A 2022    Procedure: REPLACEMENT, GASTROJEJUNOSTOMY TUBE, PERCUTANEOUS;  Surgeon: Mandeep Park MD;  [facility]     REPLACE GASTROJEJUNOSTOMY TUBE, PERCUTANEOUS N/A 2022    Procedure: REPLACEMENT, GASTROJEJUNOSTOMY TUBE, PERCUTANEOUS with ENDOSCOPIC SUTURING.;  Surgeon: Mandeep Park MD;  [facility]     REPLACE JEJUNOSTOMY TUBE, PERCUTANEOUS N/A 9/10/2021    Procedure: UPPER ENDOSCOPY, REPLACEMENT OF PERCUTANEOUS GASTROJEJUNOSTOMY TUBE, T-TAG GASTROPEXY;  Surgeon: Zackery Montoya MD;  [facility]     REPLACE JEJUNOSTOMY TUBE, PERCUTANEOUS N/A 2021    Procedure: REPLACEMENT, JEJUNOSTOMY TUBE, PERCUTANEOUS;  Surgeon: Mandeep Park MD;  [facility]     transphenoidal pituitary resection       [facility] [place]  DELIVERY ONLY       [facility] [place]   DELIVERY ONLY      repeat c section with incidental cystotomy with repair     ZZC EXCIS PITUITARY,TRANSNASAL/SEPTAL      pituitary tumor removed for diabetes insipidus     ZZC TOTAL ABDOM HYSTERECTOMY      w/ bilateral salpingoophorectomy        Family History   Problem Relation Age of Onset     Lipids Mother      Hypertension Mother      Thyroid Disease Mother      Depression Mother      Angina Mother      GERD Mother      Skin Cancer Mother      Migraines Mother      Autoimmune Disease Mother      Hyperlipidemia Mother      Mental Illness Mother      Eye Disorder Father         cataract, detached retina     Myocardial Infarction Father 60     Lipids Father      Cerebrovascular Disease Father      Depression Father      Substance Abuse Father      Anesthesia Reaction Father         stroke right after surgery     Cataracts Father      Osteoarthritis Father      Ulcerative Colitis Father      Autoimmune Disease Father      Heart Disease Father      Hyperlipidemia Father      Mental Illness Father      Interpersonal Violence Sister      Coronary Artery Disease Sister         angioplasty     GERD Sister      Substance Abuse Sister      Depression Sister      Thyroid Disease Sister      Eye Disorder Maternal Grandmother         cataract     Thyroid Disease Maternal Grandmother      Diabetes Maternal Grandfather      Eye Disorder Paternal Grandmother         cataract     Diabetes Paternal Grandmother      Eye Disorder Paternal Grandfather         cataract     Diabetes Paternal Grandfather      Substance Abuse Paternal Grandfather      Eye Disorder Son         ptosis     Depression Son      Anxiety Disorder Son      Heart Disease Paternal Aunt      Diabetes Paternal Aunt      Diabetes Paternal Uncle      Heart Disease Paternal Uncle      Depression Nephew      Anxiety Disorder Nephew      Thyroid Disease Nephew      Diabetes Type 2  Cousin         paternal cousin     Autoimmune Disease Cousin       Autoimmune Disease Sister      Depression Sister      Mental Illness Sister      Substance Abuse Sister      Thyroid Disease Sister      Depression Son      Mental Illness Son      Thyroid Disease Nephew        Social History     Tobacco Use     Smoking status: Former     Packs/day: 0.50     Years: 6.00     Pack years: 3.00     Types: Cigarettes     Start date: 1985     Quit date: 1992     Years since quittin.3     Smokeless tobacco: Not on file     Tobacco comments:     no 2nd hand   Vaping Use     Vaping status: Not on file   Substance Use Topics     Alcohol use: Not Currently     Alcohol/week: 3.0 - 6.0 standard drinks of alcohol     Types: 1 - 2 Glasses of wine, 1 - 2 Cans of beer, 1 - 2 Shots of liquor per week     Comment: none since IGG     Mandeep Park MD  Gastroenterology, Pancreas and Biliary Disorders  [facility]

## 2023-05-05 ENCOUNTER — CARE COORDINATION (OUTPATIENT)
Dept: GASTROENTEROLOGY | Facility: CLINIC | Age: 58
End: 2023-05-05
Payer: COMMERCIAL

## 2023-05-05 LAB — UPPER GI ENDOSCOPY: NORMAL

## 2023-05-05 NOTE — PROGRESS NOTES
Pt to be referred to a new pain clinic:       [facility] [place] [email or web address] my fellow John patient sees Milan Marques PA-C.    Clinic notes,demographic and referral sent    ML

## 2023-05-08 ENCOUNTER — HOSPITAL ENCOUNTER (INPATIENT)
Facility: CLINIC | Age: 58
LOS: 3 days | Discharge: HOME | End: 2023-05-12
Attending: STUDENT IN AN ORGANIZED HEALTH CARE EDUCATION/TRAINING PROGRAM | Admitting: STUDENT IN AN ORGANIZED HEALTH CARE EDUCATION/TRAINING PROGRAM
Payer: COMMERCIAL

## 2023-05-08 ENCOUNTER — PATIENT OUTREACH (OUTPATIENT)
Dept: GASTROENTEROLOGY | Facility: CLINIC | Age: 58
End: 2023-05-08
Payer: COMMERCIAL

## 2023-05-08 ENCOUNTER — DOCUMENTATION ONLY (OUTPATIENT)
Dept: GASTROENTEROLOGY | Facility: CLINIC | Age: 58
End: 2023-05-08
Payer: COMMERCIAL

## 2023-05-08 DIAGNOSIS — R63.39 FEEDING INTOLERANCE: Primary | ICD-10-CM

## 2023-05-08 DIAGNOSIS — B37.9 CANDIDA INFECTION: ICD-10-CM

## 2023-05-08 DIAGNOSIS — R11.2 NAUSEA AND VOMITING, UNSPECIFIED VOMITING TYPE: ICD-10-CM

## 2023-05-08 DIAGNOSIS — Z87.19 HISTORY OF FOOD INTOLERANCE: ICD-10-CM

## 2023-05-08 DIAGNOSIS — K31.84 GASTROPARESIS: ICD-10-CM

## 2023-05-08 DIAGNOSIS — K94.23 GASTROSTOMY TUBE OBSTRUCTION (H): ICD-10-CM

## 2023-05-08 DIAGNOSIS — B37.9 CANDIDA INFECTION: Primary | ICD-10-CM

## 2023-05-08 DIAGNOSIS — E46 MALNUTRITION, UNSPECIFIED TYPE (H): ICD-10-CM

## 2023-05-08 DIAGNOSIS — K59.01 SLOW TRANSIT CONSTIPATION: ICD-10-CM

## 2023-05-08 PROCEDURE — 96361 HYDRATE IV INFUSION ADD-ON: CPT | Performed by: STUDENT IN AN ORGANIZED HEALTH CARE EDUCATION/TRAINING PROGRAM

## 2023-05-08 PROCEDURE — 93010 ELECTROCARDIOGRAM REPORT: CPT | Performed by: STUDENT IN AN ORGANIZED HEALTH CARE EDUCATION/TRAINING PROGRAM

## 2023-05-08 PROCEDURE — 99285 EMERGENCY DEPT VISIT HI MDM: CPT | Mod: 25 | Performed by: STUDENT IN AN ORGANIZED HEALTH CARE EDUCATION/TRAINING PROGRAM

## 2023-05-08 PROCEDURE — 96374 THER/PROPH/DIAG INJ IV PUSH: CPT | Mod: 59 | Performed by: STUDENT IN AN ORGANIZED HEALTH CARE EDUCATION/TRAINING PROGRAM

## 2023-05-08 PROCEDURE — 96375 TX/PRO/DX INJ NEW DRUG ADDON: CPT | Performed by: STUDENT IN AN ORGANIZED HEALTH CARE EDUCATION/TRAINING PROGRAM

## 2023-05-08 PROCEDURE — 93005 ELECTROCARDIOGRAM TRACING: CPT | Performed by: STUDENT IN AN ORGANIZED HEALTH CARE EDUCATION/TRAINING PROGRAM

## 2023-05-08 RX ORDER — FLUCONAZOLE 40 MG/ML
400 POWDER, FOR SUSPENSION ORAL DAILY
Qty: 70 ML | Refills: 0 | Status: ON HOLD | OUTPATIENT
Start: 2023-05-08 | End: 2023-05-12

## 2023-05-08 RX ORDER — DIPHENHYDRAMINE HYDROCHLORIDE 50 MG/ML
25 INJECTION INTRAMUSCULAR; INTRAVENOUS ONCE
Status: COMPLETED | OUTPATIENT
Start: 2023-05-08 | End: 2023-05-09

## 2023-05-08 RX ORDER — ONDANSETRON 2 MG/ML
4 INJECTION INTRAMUSCULAR; INTRAVENOUS EVERY 30 MIN PRN
Status: DISCONTINUED | OUTPATIENT
Start: 2023-05-08 | End: 2023-05-09

## 2023-05-08 ASSESSMENT — ACTIVITIES OF DAILY LIVING (ADL): ADLS_ACUITY_SCORE: 37

## 2023-05-08 NOTE — LETTER
Recipient: [facility] care          Sender: Blue Douglas [phone]              Date: May 12, 2023  Patient Name:  Dinora Mcghee      The documents accompanying this notice contain confidential information belonging to the sender.  This information is intended only for the use of the individual or entity named above.  The authorized recipient of this information is prohibited from disclosing this information to any other party and is required to destroy the information after its stated need has been fulfilled, unless otherwise required by state law.    If you are not the intended recipient, you are hereby notified that any disclosure, copy, distribution or action taken in reliance on the contents of these documents is strictly prohibited.  If you have received this document in error, please return it by fax to [phone] with a note on the cover sheet explaining why you are returning it (e.g. not your patient, not your provider, etc.).  Documents may also be returned by mail to [employer], , [facility] [address], [place], [place] [address].

## 2023-05-08 NOTE — TELEPHONE ENCOUNTER
Per Dr. Park  [facility] had egd findings suggestive of candida esophagitis, and cultures confirm it     Fluconazole (400 mg orally x 7 days) Ordered via liquid per patient preference.       Related to TPN, sherman recommending admission, asked pt to present to ER for admission to start TPN- she's getting port accessed today for IV fluids, tube feeds still only at 30ml, diarrhea x6 today    Message sent to team    ML

## 2023-05-08 NOTE — PROGRESS NOTES
Faxed referral sheet, reports, and clinic notes to [facility].    Faxed:   1- Referral summary sent via Email   2- ADT Demographic Sheet   3- Imaging: CT report from January 2023 (CT ABDOMEN W/O CONTRAST) and December 2022 (CT ABDOMEN PELVIS W CONTRAST)  4- Clinic notes:   4/26/23: Dr. Mandeep Park   4/18/23: Dr. Monika Mahajan   3/30/34: Dr. Elver Bauer   3/6/23: Dr. Juan Jose Gomez   2/22/23: Dr. Elver Bauer   2/14/23: Dr. Elijah Baugh   2/8/23: Dr. Vijaya Genao   1/26/23: Dr. Elver Bauer    1/19/23: Dr. Elver Bauer   1/12/23: Dr. Elver Bauer    12/22/22: Dr. Elver Bauer   12/1/22: Dr. Elver Bauer   11/2/22: Dr. Mandeep Park     Facility Information:  [facility]  Phone #: [phone]  Fax #: [phone]        SK

## 2023-05-09 ENCOUNTER — APPOINTMENT (OUTPATIENT)
Dept: CT IMAGING | Facility: CLINIC | Age: 58
End: 2023-05-09
Attending: STUDENT IN AN ORGANIZED HEALTH CARE EDUCATION/TRAINING PROGRAM
Payer: COMMERCIAL

## 2023-05-09 PROBLEM — R11.2 NAUSEA AND VOMITING, UNSPECIFIED VOMITING TYPE: Status: ACTIVE | Noted: 2023-05-09

## 2023-05-09 PROBLEM — E46 MALNUTRITION, UNSPECIFIED TYPE (H): Status: ACTIVE | Noted: 2023-05-09

## 2023-05-09 PROBLEM — Z87.19 HISTORY OF FOOD INTOLERANCE: Status: ACTIVE | Noted: 2023-05-09

## 2023-05-09 LAB
ALBUMIN SERPL BCG-MCNC: 4.1 G/DL (ref 3.5–5.2)
ALBUMIN SERPL BCG-MCNC: 4.2 G/DL (ref 3.5–5.2)
ALP SERPL-CCNC: 119 U/L (ref 35–104)
ALP SERPL-CCNC: 123 U/L (ref 35–104)
ALT SERPL W P-5'-P-CCNC: 65 U/L (ref 10–35)
ALT SERPL W P-5'-P-CCNC: 67 U/L (ref 10–35)
ANION GAP SERPL CALCULATED.3IONS-SCNC: 10 MMOL/L (ref 7–15)
ANION GAP SERPL CALCULATED.3IONS-SCNC: 11 MMOL/L (ref 7–15)
AST SERPL W P-5'-P-CCNC: 27 U/L (ref 10–35)
AST SERPL W P-5'-P-CCNC: 36 U/L (ref 10–35)
ATRIAL RATE - MUSE: 77 BPM
B-OH-BUTYR SERPL-SCNC: 0.4 MMOL/L
BASOPHILS # BLD AUTO: 0 10E3/UL (ref 0–0.2)
BASOPHILS # BLD AUTO: 0.1 10E3/UL (ref 0–0.2)
BASOPHILS NFR BLD AUTO: 1 %
BASOPHILS NFR BLD AUTO: 1 %
BILIRUB SERPL-MCNC: 0.5 MG/DL
BILIRUB SERPL-MCNC: 0.5 MG/DL
BUN SERPL-MCNC: 6.3 MG/DL (ref 6–20)
BUN SERPL-MCNC: 6.5 MG/DL (ref 6–20)
CALCIUM SERPL-MCNC: 9.2 MG/DL (ref 8.6–10)
CALCIUM SERPL-MCNC: 9.3 MG/DL (ref 8.6–10)
CHLORIDE SERPL-SCNC: 103 MMOL/L (ref 98–107)
CHLORIDE SERPL-SCNC: 104 MMOL/L (ref 98–107)
CREAT SERPL-MCNC: 0.49 MG/DL (ref 0.51–0.95)
CREAT SERPL-MCNC: 0.49 MG/DL (ref 0.51–0.95)
DEPRECATED HCO3 PLAS-SCNC: 27 MMOL/L (ref 22–29)
DEPRECATED HCO3 PLAS-SCNC: 28 MMOL/L (ref 22–29)
DIASTOLIC BLOOD PRESSURE - MUSE: NORMAL MMHG
EOSINOPHIL # BLD AUTO: 0.4 10E3/UL (ref 0–0.7)
EOSINOPHIL # BLD AUTO: 0.4 10E3/UL (ref 0–0.7)
EOSINOPHIL NFR BLD AUTO: 6 %
EOSINOPHIL NFR BLD AUTO: 7 %
ERYTHROCYTE [DISTWIDTH] IN BLOOD BY AUTOMATED COUNT: 13.4 % (ref 10–15)
ERYTHROCYTE [DISTWIDTH] IN BLOOD BY AUTOMATED COUNT: 13.5 % (ref 10–15)
GFR SERPL CREATININE-BSD FRML MDRD: >90 ML/MIN/1.73M2
GFR SERPL CREATININE-BSD FRML MDRD: >90 ML/MIN/1.73M2
GLUCOSE BLDC GLUCOMTR-MCNC: 103 MG/DL (ref 70–99)
GLUCOSE SERPL-MCNC: 122 MG/DL (ref 70–99)
GLUCOSE SERPL-MCNC: 128 MG/DL (ref 70–99)
HCT VFR BLD AUTO: 38.1 % (ref 35–47)
HCT VFR BLD AUTO: 39.9 % (ref 35–47)
HGB BLD-MCNC: 12.1 G/DL (ref 11.7–15.7)
HGB BLD-MCNC: 12.6 G/DL (ref 11.7–15.7)
IMM GRANULOCYTES # BLD: 0 10E3/UL
IMM GRANULOCYTES # BLD: 0 10E3/UL
IMM GRANULOCYTES NFR BLD: 0 %
IMM GRANULOCYTES NFR BLD: 0 %
INTERPRETATION ECG - MUSE: NORMAL
LACTATE SERPL-SCNC: 0.6 MMOL/L (ref 0.7–2)
LIPASE SERPL-CCNC: 5 U/L (ref 13–60)
LYMPHOCYTES # BLD AUTO: 2.6 10E3/UL (ref 0.8–5.3)
LYMPHOCYTES # BLD AUTO: 2.7 10E3/UL (ref 0.8–5.3)
LYMPHOCYTES NFR BLD AUTO: 43 %
LYMPHOCYTES NFR BLD AUTO: 44 %
MAGNESIUM SERPL-MCNC: 2.2 MG/DL (ref 1.7–2.3)
MCH RBC QN AUTO: 29.9 PG (ref 26.5–33)
MCH RBC QN AUTO: 30 PG (ref 26.5–33)
MCHC RBC AUTO-ENTMCNC: 31.6 G/DL (ref 31.5–36.5)
MCHC RBC AUTO-ENTMCNC: 31.8 G/DL (ref 31.5–36.5)
MCV RBC AUTO: 95 FL (ref 78–100)
MCV RBC AUTO: 95 FL (ref 78–100)
MONOCYTES # BLD AUTO: 0.7 10E3/UL (ref 0–1.3)
MONOCYTES # BLD AUTO: 0.8 10E3/UL (ref 0–1.3)
MONOCYTES NFR BLD AUTO: 12 %
MONOCYTES NFR BLD AUTO: 13 %
NEUTROPHILS # BLD AUTO: 2.2 10E3/UL (ref 1.6–8.3)
NEUTROPHILS # BLD AUTO: 2.2 10E3/UL (ref 1.6–8.3)
NEUTROPHILS NFR BLD AUTO: 36 %
NEUTROPHILS NFR BLD AUTO: 37 %
NRBC # BLD AUTO: 0 10E3/UL
NRBC # BLD AUTO: 0 10E3/UL
NRBC BLD AUTO-RTO: 0 /100
NRBC BLD AUTO-RTO: 0 /100
P AXIS - MUSE: 57 DEGREES
PHOSPHATE SERPL-MCNC: 3.2 MG/DL (ref 2.5–4.5)
PLATELET # BLD AUTO: 420 10E3/UL (ref 150–450)
PLATELET # BLD AUTO: 425 10E3/UL (ref 150–450)
POTASSIUM SERPL-SCNC: 3.9 MMOL/L (ref 3.4–5.3)
POTASSIUM SERPL-SCNC: 3.9 MMOL/L (ref 3.4–5.3)
PR INTERVAL - MUSE: 146 MS
PROT SERPL-MCNC: 6.8 G/DL (ref 6.4–8.3)
PROT SERPL-MCNC: 6.9 G/DL (ref 6.4–8.3)
QRS DURATION - MUSE: 78 MS
QT - MUSE: 416 MS
QTC - MUSE: 470 MS
R AXIS - MUSE: 18 DEGREES
RBC # BLD AUTO: 4.03 10E6/UL (ref 3.8–5.2)
RBC # BLD AUTO: 4.21 10E6/UL (ref 3.8–5.2)
SODIUM SERPL-SCNC: 141 MMOL/L (ref 136–145)
SODIUM SERPL-SCNC: 142 MMOL/L (ref 136–145)
SYSTOLIC BLOOD PRESSURE - MUSE: NORMAL MMHG
T AXIS - MUSE: 41 DEGREES
VENTRICULAR RATE- MUSE: 77 BPM
VIT B12 SERPL-MCNC: 798 PG/ML (ref 232–1245)
WBC # BLD AUTO: 6 10E3/UL (ref 4–11)
WBC # BLD AUTO: 6 10E3/UL (ref 4–11)

## 2023-05-09 PROCEDURE — 258N000003 HC RX IP 258 OP 636

## 2023-05-09 PROCEDURE — 250N000013 HC RX MED GY IP 250 OP 250 PS 637: Performed by: STUDENT IN AN ORGANIZED HEALTH CARE EDUCATION/TRAINING PROGRAM

## 2023-05-09 PROCEDURE — 85025 COMPLETE CBC W/AUTO DIFF WBC: CPT | Performed by: STUDENT IN AN ORGANIZED HEALTH CARE EDUCATION/TRAINING PROGRAM

## 2023-05-09 PROCEDURE — 84100 ASSAY OF PHOSPHORUS: CPT | Performed by: STUDENT IN AN ORGANIZED HEALTH CARE EDUCATION/TRAINING PROGRAM

## 2023-05-09 PROCEDURE — C9113 INJ PANTOPRAZOLE SODIUM, VIA: HCPCS

## 2023-05-09 PROCEDURE — 99418 PROLNG IP/OBS E/M EA 15 MIN: CPT

## 2023-05-09 PROCEDURE — 250N000013 HC RX MED GY IP 250 OP 250 PS 637

## 2023-05-09 PROCEDURE — 250N000009 HC RX 250: Performed by: STUDENT IN AN ORGANIZED HEALTH CARE EDUCATION/TRAINING PROGRAM

## 2023-05-09 PROCEDURE — 82962 GLUCOSE BLOOD TEST: CPT

## 2023-05-09 PROCEDURE — 82607 VITAMIN B-12: CPT | Performed by: STUDENT IN AN ORGANIZED HEALTH CARE EDUCATION/TRAINING PROGRAM

## 2023-05-09 PROCEDURE — 84630 ASSAY OF ZINC: CPT | Performed by: STUDENT IN AN ORGANIZED HEALTH CARE EDUCATION/TRAINING PROGRAM

## 2023-05-09 PROCEDURE — 99223 1ST HOSP IP/OBS HIGH 75: CPT | Mod: FS

## 2023-05-09 PROCEDURE — 99222 1ST HOSP IP/OBS MODERATE 55: CPT | Mod: GC | Performed by: INTERNAL MEDICINE

## 2023-05-09 PROCEDURE — 3E0436Z INTRODUCTION OF NUTRITIONAL SUBSTANCE INTO CENTRAL VEIN, PERCUTANEOUS APPROACH: ICD-10-PCS | Performed by: STUDENT IN AN ORGANIZED HEALTH CARE EDUCATION/TRAINING PROGRAM

## 2023-05-09 PROCEDURE — 258N000003 HC RX IP 258 OP 636: Performed by: STUDENT IN AN ORGANIZED HEALTH CARE EDUCATION/TRAINING PROGRAM

## 2023-05-09 PROCEDURE — 84446 ASSAY OF VITAMIN E: CPT | Performed by: STUDENT IN AN ORGANIZED HEALTH CARE EDUCATION/TRAINING PROGRAM

## 2023-05-09 PROCEDURE — 83735 ASSAY OF MAGNESIUM: CPT | Performed by: STUDENT IN AN ORGANIZED HEALTH CARE EDUCATION/TRAINING PROGRAM

## 2023-05-09 PROCEDURE — 250N000011 HC RX IP 250 OP 636

## 2023-05-09 PROCEDURE — 99207 PR APP CREDIT; MD BILLING SHARED VISIT: CPT | Performed by: STUDENT IN AN ORGANIZED HEALTH CARE EDUCATION/TRAINING PROGRAM

## 2023-05-09 PROCEDURE — B4185 PARENTERAL SOL 10 GM LIPIDS: HCPCS | Performed by: STUDENT IN AN ORGANIZED HEALTH CARE EDUCATION/TRAINING PROGRAM

## 2023-05-09 PROCEDURE — 83690 ASSAY OF LIPASE: CPT | Performed by: STUDENT IN AN ORGANIZED HEALTH CARE EDUCATION/TRAINING PROGRAM

## 2023-05-09 PROCEDURE — 82010 KETONE BODYS QUAN: CPT | Performed by: STUDENT IN AN ORGANIZED HEALTH CARE EDUCATION/TRAINING PROGRAM

## 2023-05-09 PROCEDURE — 85004 AUTOMATED DIFF WBC COUNT: CPT

## 2023-05-09 PROCEDURE — 83605 ASSAY OF LACTIC ACID: CPT | Performed by: STUDENT IN AN ORGANIZED HEALTH CARE EDUCATION/TRAINING PROGRAM

## 2023-05-09 PROCEDURE — 82306 VITAMIN D 25 HYDROXY: CPT | Performed by: STUDENT IN AN ORGANIZED HEALTH CARE EDUCATION/TRAINING PROGRAM

## 2023-05-09 PROCEDURE — 80053 COMPREHEN METABOLIC PANEL: CPT | Performed by: STUDENT IN AN ORGANIZED HEALTH CARE EDUCATION/TRAINING PROGRAM

## 2023-05-09 PROCEDURE — 36415 COLL VENOUS BLD VENIPUNCTURE: CPT

## 2023-05-09 PROCEDURE — 84590 ASSAY OF VITAMIN A: CPT | Performed by: STUDENT IN AN ORGANIZED HEALTH CARE EDUCATION/TRAINING PROGRAM

## 2023-05-09 PROCEDURE — 74177 CT ABD & PELVIS W/CONTRAST: CPT | Mod: 26 | Performed by: RADIOLOGY

## 2023-05-09 PROCEDURE — 36415 COLL VENOUS BLD VENIPUNCTURE: CPT | Performed by: STUDENT IN AN ORGANIZED HEALTH CARE EDUCATION/TRAINING PROGRAM

## 2023-05-09 PROCEDURE — 120N000002 HC R&B MED SURG/OB UMMC

## 2023-05-09 PROCEDURE — 250N000011 HC RX IP 250 OP 636: Performed by: STUDENT IN AN ORGANIZED HEALTH CARE EDUCATION/TRAINING PROGRAM

## 2023-05-09 PROCEDURE — 84155 ASSAY OF PROTEIN SERUM: CPT

## 2023-05-09 PROCEDURE — 74177 CT ABD & PELVIS W/CONTRAST: CPT

## 2023-05-09 RX ORDER — DIPHENHYDRAMINE HCL 12.5MG/5ML
25 LIQUID (ML) ORAL EVERY 6 HOURS PRN
Status: DISCONTINUED | OUTPATIENT
Start: 2023-05-09 | End: 2023-05-12 | Stop reason: HOSPADM

## 2023-05-09 RX ORDER — CETIRIZINE HYDROCHLORIDE 10 MG/1
10 TABLET ORAL DAILY
Status: DISCONTINUED | OUTPATIENT
Start: 2023-05-09 | End: 2023-05-12 | Stop reason: HOSPADM

## 2023-05-09 RX ORDER — FLUCONAZOLE 40 MG/ML
400 POWDER, FOR SUSPENSION ORAL DAILY
Status: DISCONTINUED | OUTPATIENT
Start: 2023-05-09 | End: 2023-05-12 | Stop reason: HOSPADM

## 2023-05-09 RX ORDER — DIPHENHYDRAMINE HCL 25 MG
25 CAPSULE ORAL EVERY 6 HOURS PRN
Status: DISCONTINUED | OUTPATIENT
Start: 2023-05-09 | End: 2023-05-09

## 2023-05-09 RX ORDER — PROCHLORPERAZINE MALEATE 10 MG
10 TABLET ORAL EVERY 6 HOURS PRN
Status: DISCONTINUED | OUTPATIENT
Start: 2023-05-09 | End: 2023-05-12 | Stop reason: HOSPADM

## 2023-05-09 RX ORDER — HYDROMORPHONE HCL IN WATER/PF 6 MG/30 ML
0.2 PATIENT CONTROLLED ANALGESIA SYRINGE INTRAVENOUS
Status: DISPENSED | OUTPATIENT
Start: 2023-05-09 | End: 2023-05-10

## 2023-05-09 RX ORDER — SODIUM CHLORIDE, SODIUM LACTATE, POTASSIUM CHLORIDE, CALCIUM CHLORIDE 600; 310; 30; 20 MG/100ML; MG/100ML; MG/100ML; MG/100ML
INJECTION, SOLUTION INTRAVENOUS CONTINUOUS
Status: DISCONTINUED | OUTPATIENT
Start: 2023-05-09 | End: 2023-05-11

## 2023-05-09 RX ORDER — DEXTROSE MONOHYDRATE 100 MG/ML
INJECTION, SOLUTION INTRAVENOUS CONTINUOUS PRN
Status: DISCONTINUED | OUTPATIENT
Start: 2023-05-09 | End: 2023-05-12 | Stop reason: HOSPADM

## 2023-05-09 RX ORDER — IOPAMIDOL 755 MG/ML
78 INJECTION, SOLUTION INTRAVASCULAR ONCE
Status: COMPLETED | OUTPATIENT
Start: 2023-05-09 | End: 2023-05-09

## 2023-05-09 RX ORDER — MONTELUKAST SODIUM 10 MG/1
10 TABLET ORAL AT BEDTIME
Status: DISCONTINUED | OUTPATIENT
Start: 2023-05-09 | End: 2023-05-12 | Stop reason: HOSPADM

## 2023-05-09 RX ORDER — LIDOCAINE 40 MG/G
CREAM TOPICAL
Status: DISCONTINUED | OUTPATIENT
Start: 2023-05-09 | End: 2023-05-12 | Stop reason: HOSPADM

## 2023-05-09 RX ORDER — SCOLOPAMINE TRANSDERMAL SYSTEM 1 MG/1
1 PATCH, EXTENDED RELEASE TRANSDERMAL
Status: DISCONTINUED | OUTPATIENT
Start: 2023-05-09 | End: 2023-05-10

## 2023-05-09 RX ORDER — ACETAMINOPHEN 325 MG/10.15ML
650 LIQUID ORAL EVERY 6 HOURS PRN
Status: DISCONTINUED | OUTPATIENT
Start: 2023-05-09 | End: 2023-05-12 | Stop reason: HOSPADM

## 2023-05-09 RX ORDER — PROCHLORPERAZINE 25 MG
25 SUPPOSITORY, RECTAL RECTAL EVERY 12 HOURS PRN
Status: DISCONTINUED | OUTPATIENT
Start: 2023-05-09 | End: 2023-05-12 | Stop reason: HOSPADM

## 2023-05-09 RX ORDER — LEVOTHYROXINE SODIUM 137 UG/1
137 TABLET ORAL DAILY
Status: DISCONTINUED | OUTPATIENT
Start: 2023-05-09 | End: 2023-05-12 | Stop reason: HOSPADM

## 2023-05-09 RX ORDER — CYCLOBENZAPRINE HCL 5 MG
10 TABLET ORAL 2 TIMES DAILY PRN
Status: DISCONTINUED | OUTPATIENT
Start: 2023-05-09 | End: 2023-05-12 | Stop reason: HOSPADM

## 2023-05-09 RX ORDER — METHOCARBAMOL 750 MG/1
750 TABLET, FILM COATED ORAL 4 TIMES DAILY
Status: DISCONTINUED | OUTPATIENT
Start: 2023-05-09 | End: 2023-05-12 | Stop reason: HOSPADM

## 2023-05-09 RX ADMIN — MAGNESIUM SULFATE HEPTAHYDRATE: 500 INJECTION, SOLUTION INTRAMUSCULAR; INTRAVENOUS at 19:53

## 2023-05-09 RX ADMIN — PROCHLORPERAZINE EDISYLATE 10 MG: 5 INJECTION INTRAMUSCULAR; INTRAVENOUS at 10:14

## 2023-05-09 RX ADMIN — PANTOPRAZOLE SODIUM 40 MG: 40 INJECTION, POWDER, FOR SOLUTION INTRAVENOUS at 08:46

## 2023-05-09 RX ADMIN — PROCHLORPERAZINE EDISYLATE 10 MG: 5 INJECTION INTRAMUSCULAR; INTRAVENOUS at 05:37

## 2023-05-09 RX ADMIN — HYDROMORPHONE HYDROCHLORIDE 0.2 MG: 0.2 INJECTION, SOLUTION INTRAMUSCULAR; INTRAVENOUS; SUBCUTANEOUS at 11:30

## 2023-05-09 RX ADMIN — IOPAMIDOL 78 ML: 755 INJECTION, SOLUTION INTRAVENOUS at 02:43

## 2023-05-09 RX ADMIN — SODIUM CHLORIDE, POTASSIUM CHLORIDE, SODIUM LACTATE AND CALCIUM CHLORIDE 1000 ML: 600; 310; 30; 20 INJECTION, SOLUTION INTRAVENOUS at 00:55

## 2023-05-09 RX ADMIN — DIPHENHYDRAMINE HYDROCHLORIDE 25 MG: 50 INJECTION, SOLUTION INTRAMUSCULAR; INTRAVENOUS at 00:55

## 2023-05-09 RX ADMIN — THIAMINE HCL TAB 100 MG 100 MG: 100 TAB at 14:29

## 2023-05-09 RX ADMIN — SODIUM CHLORIDE, POTASSIUM CHLORIDE, SODIUM LACTATE AND CALCIUM CHLORIDE: 600; 310; 30; 20 INJECTION, SOLUTION INTRAVENOUS at 05:39

## 2023-05-09 RX ADMIN — METHOCARBAMOL 750 MG: 750 TABLET ORAL at 11:29

## 2023-05-09 RX ADMIN — DIPHENHYDRAMINE HYDROCHLORIDE 25 MG: 25 CAPSULE ORAL at 05:37

## 2023-05-09 RX ADMIN — HYDROMORPHONE HYDROCHLORIDE 0.2 MG: 0.2 INJECTION, SOLUTION INTRAMUSCULAR; INTRAVENOUS; SUBCUTANEOUS at 05:37

## 2023-05-09 RX ADMIN — METHOCARBAMOL 750 MG: 750 TABLET ORAL at 21:47

## 2023-05-09 RX ADMIN — HYDROMORPHONE HYDROCHLORIDE 1 MG: 1 INJECTION, SOLUTION INTRAMUSCULAR; INTRAVENOUS; SUBCUTANEOUS at 00:57

## 2023-05-09 RX ADMIN — OLIVE OIL AND SOYBEAN OIL 250 ML: 16; 4 INJECTION, EMULSION INTRAVENOUS at 21:34

## 2023-05-09 RX ADMIN — METHOCARBAMOL 750 MG: 750 TABLET ORAL at 08:45

## 2023-05-09 RX ADMIN — MONTELUKAST 10 MG: 10 TABLET, FILM COATED ORAL at 21:35

## 2023-05-09 RX ADMIN — METHOCARBAMOL 750 MG: 750 TABLET ORAL at 17:20

## 2023-05-09 RX ADMIN — PROCHLORPERAZINE EDISYLATE 10 MG: 5 INJECTION INTRAMUSCULAR; INTRAVENOUS at 01:02

## 2023-05-09 RX ADMIN — PROCHLORPERAZINE EDISYLATE 10 MG: 5 INJECTION INTRAMUSCULAR; INTRAVENOUS at 16:20

## 2023-05-09 RX ADMIN — ACETAMINOPHEN 650 MG: 160 SOLUTION ORAL at 10:14

## 2023-05-09 RX ADMIN — LEVOTHYROXINE SODIUM 137 MCG: 0.14 TABLET ORAL at 08:45

## 2023-05-09 RX ADMIN — HYDROMORPHONE HYDROCHLORIDE 0.2 MG: 0.2 INJECTION, SOLUTION INTRAMUSCULAR; INTRAVENOUS; SUBCUTANEOUS at 21:48

## 2023-05-09 RX ADMIN — FLUCONAZOLE 400 MG: 40 POWDER, FOR SUSPENSION ORAL at 10:26

## 2023-05-09 ASSESSMENT — ACTIVITIES OF DAILY LIVING (ADL)
ADLS_ACUITY_SCORE: 39
ADLS_ACUITY_SCORE: 41
ADLS_ACUITY_SCORE: 41
ADLS_ACUITY_SCORE: 39
ADLS_ACUITY_SCORE: 41
ADLS_ACUITY_SCORE: 41
ADLS_ACUITY_SCORE: 37
ADLS_ACUITY_SCORE: 37
ADLS_ACUITY_SCORE: 41
ADLS_ACUITY_SCORE: 37

## 2023-05-09 NOTE — ED PROVIDER NOTES
[facility] ([facility])  May 8, 2023      History     Chief Complaint   Patient presents with     Nausea, Vomiting, & Diarrhea     HPI  Dinora Mcghee is a 58 year old female with a past medical history of GERD, DM1, G-tube, s/p cholecystectomy, chronic pain, and ERCP who presents to the emergency referred by PCP to ED from admission for intolerance to tube feeding characterized by abdominal pain, N/V/D.  Patient denies bloody stools/urine/emesis.    Patient notes that she has been having issues ever since she had her J-tube adjusted, and despite attempting to increase her tube feedings, she has only been able to get up to about 30 cc/h and her baseline is around 70 cc/h.  When she increases it any further, she has significant diarrhea, multiple episodes of nonbilious nonbloody vomiting.    Endorses abdominal pain, worse than her baseline especially in the epigastrium.  No fevers.  Passing gas      Past Medical History  Past Medical History:   Diagnosis Date     Allergic rhinitis, cause unspecified      Allergy to other foods     strawberries, apples, celeries, alice, watermelon     Arthritis     left knee     Choledocholithiasis     long after cholecystectomy     Chronic abdominal pain      Chronic constipation      Chronic nausea      Chronic pancreatitis (H)      Degeneration of lumbar or lumbosacral intervertebral disc      Esophageal reflux     w/ hiatal hernia     Gastroparesis      Hiatal hernia      History of pituitary adenoma     s/p resection     Hypothyroidism      Migraines      Mild hyperlipidemia      On tube feeding diet     presence of GJ tube     Pancreatic disease     PD stricture, 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 endometriosis growth     APPENDECTOMY        SECTION       COLONOSCOPY       ENDOSCOPIC INSERTION TUBE GASTROSTOMY N/A 2021    Procedure: Gastrojejonostomy placement with jejunopexy, PEG tube exchange;  Surgeon: Zackery Montoya MD;  [facility]     ENDOSCOPIC RETROGRADE CHOLANGIOPANCREATOGRAM N/A 2015    Procedure: ENDOSCOPIC RETROGRADE CHOLANGIOPANCREATOGRAM;  Surgeon: Mandeep Park MD;  [facility]     ENDOSCOPIC RETROGRADE CHOLANGIOPANCREATOGRAM N/A 2016    Procedure: COMBINED ENDOSCOPIC RETROGRADE CHOLANGIOPANCREATOGRAPHY, PLACE TUBE/STENT;  Surgeon: Mandeep Park MD;  [facility]     ENDOSCOPIC RETROGRADE CHOLANGIOPANCREATOGRAM N/A 3/17/2016    Procedure: COMBINED ENDOSCOPIC RETROGRADE CHOLANGIOPANCREATOGRAPHY, REMOVE FOREIGN BODY OR STENT/TUBE;  Surgeon: aMndeep Park MD;  [facility]     ENDOSCOPIC RETROGRADE CHOLANGIOPANCREATOGRAM N/A 2016    Procedure: COMBINED ENDOSCOPIC RETROGRADE CHOLANGIOPANCREATOGRAPHY, PLACE TUBE/STENT;  Surgeon: Mandeep Park MD;  [facility]     ENDOSCOPIC RETROGRADE CHOLANGIOPANCREATOGRAM N/A 2016    Procedure: COMBINED ENDOSCOPIC RETROGRADE CHOLANGIOPANCREATOGRAPHY, REMOVE FOREIGN BODY OR STENT/TUBE;  Surgeon: Mandeep Park MD;  [facility]     ENDOSCOPIC ULTRASOUND UPPER GASTROINTESTINAL TRACT (GI) N/A 10/3/2016    Procedure: ENDOSCOPIC ULTRASOUND, ESOPHAGOSCOPY / UPPER GASTROINTESTINAL TRACT (GI);  Surgeon: Guru Jose Rodas MD;  [facility]     ENTEROSCOPY SMALL BOWEL N/A 2/3/2022    Procedure: ENTEROSCOPY with possible fistula closure;  Surgeon: Francisco Dodson MD;  Location: [facility]     ESOPHAGOSCOPY, GASTROSCOPY, DUODENOSCOPY (EGD), COMBINED N/A 2015    Procedure: COMBINED ESOPHAGOSCOPY, GASTROSCOPY, DUODENOSCOPY (EGD), REMOVE FOREIGN BODY;  Surgeon: Mandeep Park,  MD;  [facility]     ESOPHAGOSCOPY, GASTROSCOPY, DUODENOSCOPY (EGD), COMBINED N/A 10/25/2015    Procedure: COMBINED ESOPHAGOSCOPY, GASTROSCOPY, DUODENOSCOPY (EGD);  Surgeon: Sammy Amaro MD;  [facility]     ESOPHAGOSCOPY, GASTROSCOPY, DUODENOSCOPY (EGD), COMBINED N/A 10/25/2015    Procedure: COMBINED ESOPHAGOSCOPY, GASTROSCOPY, DUODENOSCOPY (EGD), BIOPSY SINGLE OR MULTIPLE;  Surgeon: Sammy Amaro MD;  [facility]     ESOPHAGOSCOPY, GASTROSCOPY, DUODENOSCOPY (EGD), COMBINED N/A 1/31/2023    Procedure: ESOPHAGOGASTRODUODENOSCOPY (EGD) with peg replacement ;  Surgeon: Mandeep Park MD;  [facility]     ESOPHAGOSCOPY, GASTROSCOPY, DUODENOSCOPY (EGD), DILATATION, COMBINED       EXCISE LESION TRUNK N/A 4/17/2017    Procedure: EXCISE LESION TRUNK;  Removal of Abdominal Foreign Body;  Surgeon: Nestor Phoenix MD;  [facility]     HC ESOPH/GAS REFLUX TEST W NASAL IMPED >1 HR N/A 11/19/2015    Procedure: ESOPHAGEAL IMPEDENCE FUNCTION TEST WITH 24 HOUR PH GREATER THAN 1 HOUR;  Surgeon: Thiago Apple MD;  [facility]     HC UGI ENDOSCOPY DIAG W BIOPSY  9/17/08     HC UGI ENDOSCOPY DIAG W BIOPSY  9/27/12     HC UGI ENDOSCOPY W ESOPHAGEAL DILATION BALLOON <30MM  9/17/08     HC UGI ENDOSCOPY W EUS N/A 5/5/2015    Procedure: COMBINED ENDOSCOPIC ULTRASOUND, ESOPHAGOSCOPY, GASTROSCOPY, DUODENOSCOPY (EGD);  Surgeon: Wm Dueñas MD;  [facility]     HC WRIST ARTHROSCOP,RELEASE XVERS LIG Bilateral 12/17/08     INJECT TRANSVERSUS ABDOMINIS PLANE (TAP) BLOCK BILATERAL Left 9/22/2016    Procedure: INJECT TRANSVERSUS ABDOMINIS PLANE (TAP) BLOCK BILATERAL;  Surgeon: Dickson Corrigan MD;  [facility]     INJECT TRIGGER POINT Bilateral 9/8/2022    Procedure: Abdominal trigger point injection with ultrasound;  Surgeon: Monika Mahajan MD;  [facility]     INJECT TRIGGER POINT SINGLE / MULTIPLE 3 OR MORE MUSCLES Right 11/15/2022    Procedure: Trigger point  injections right abdomen with ultrasound guidance;  Surgeon: Monika Mahajan MD;  Location: [facility]     IR CHEST PORT PLACEMENT < 5 YRS OF AGE  6/10/2022     laparoscopic pineda  1995     LAPAROSCOPIC HERNIORRHAPHY INCISIONAL N/A 8/23/2018    Procedure: LAPAROSCOPIC HERNIORRHAPHY INCISIONAL;  Laparoscopic Incisional Hernia Repair with Symbotex Mesh Implant;  Surgeon: Nestor Phoenix MD;  [facility]     LAPAROSCOPIC PANCREATECTOMY, TRANSPLANT AUTO ISLET CELL N/A 12/28/2016    Procedure: LAPAROSCOPIC PANCREATECTOMY, TRANSPLANT AUTO ISLET CELL;  Surgeon: Nestor Phoenix MD;  [facility]     LAPAROTOMY EXPLORATORY N/A 1/31/2023    Procedure: Exploratory Laparotomy, lysis of adhesions, Perforated J-Junostomy Resection, Replace J-Junostomy site;  Surgeon: Elver Bauer MD;  [facility]     LAPAROTOMY, LYSIS ADHESIONS, COMBINED N/A 1/31/2023    Procedure: Dilatation of jejunostomy feeding tube, track with placement of jejunostomy tube with fluoroscopy;  Surgeon: Elver Bauer MD;  [facility]     REMOVE AND REPLACE BREAST IMPLANT PROSTHESIS N/A 12/30/2022    Procedure: Exploratory Laparotomy, lysis of adhesions, small bowel resection. Placement of gastric jejunostomy for enteral feeding.;  Surgeon: Elver Bauer MD;  [facility]     REMOVE GASTROSTOMY TUBE ADULT N/A 1/28/2022    Procedure: REMOVAL, GASTROSTOMY TUBE, ADULT;  Surgeon: Mandeep Park MD;  [facility]     REPLACE GASTROJEJUNOSTOMY TUBE, PERCUTANEOUS N/A 9/7/2021    Procedure: GASTROJEJUNOSTOMY TUBE PLACEMENT, PERCUTANEOUS, WITH GASTROPEXY;  Surgeon: Mandeep Park MD;  [facility]     REPLACE GASTROJEJUNOSTOMY TUBE, PERCUTANEOUS N/A 9/22/2021    Procedure: REPLACEMENT, GASTROJEJUNOSTOMY TUBE, PERCUTANEOUS;  Surgeon: Zackery Montoya MD;  [facility]     REPLACE GASTROJEJUNOSTOMY TUBE, PERCUTANEOUS N/A 11/22/2022    Procedure: REPLACEMENT, GASTROJEJUNOSTOMY TUBE, PERCUTANEOUS;  " Surgeon: Mandeep Park MD;  [facility]     REPLACE GASTROJEJUNOSTOMY TUBE, PERCUTANEOUS N/A 2022    Procedure: REPLACEMENT, GASTROJEJUNOSTOMY TUBE, PERCUTANEOUS with ENDOSCOPIC SUTURING.;  Surgeon: Mandeep Park MD;  [facility]     REPLACE JEJUNOSTOMY TUBE, PERCUTANEOUS N/A 9/10/2021    Procedure: UPPER ENDOSCOPY, REPLACEMENT OF PERCUTANEOUS GASTROJEJUNOSTOMY TUBE, T-TAG GASTROPEXY;  Surgeon: Zackery Montoya MD;  [facility]     REPLACE JEJUNOSTOMY TUBE, PERCUTANEOUS N/A 2021    Procedure: REPLACEMENT, JEJUNOSTOMY TUBE, PERCUTANEOUS;  Surgeon: Mandeep Park MD;  [facility]     REPLACE JEJUNOSTOMY TUBE, PERCUTANEOUS N/A 2023    Procedure: REPLACEMENT, JEJUNOSTOMY TUBE, PERCUTANEOUS;  Surgeon: Mandeep Park MD;  [facility]     transphenoidal pituitary resection       [facility]  DELIVERY ONLY       [facility]  DELIVERY ONLY      repeat c section with incidental cystotomy with repair     [facility] EXCIS PITUITARY,TRANSNASAL/SEPTAL      pituitary tumor removed for diabetes insipidus     [facility] TOTAL ABDOM HYSTERECTOMY      w/ bilateral salpingoophorectomy      acarbose (PRECOSE) 100 MG tablet  acetaminophen (TYLENOL) 325 MG/10.15ML solution  Acetaminophen Childrens 160 MG/5ML SUSP  amylase-lipase-protease (CREON) 38291-34756 units CPEP per EC capsule  blood glucose (PAT CONTOUR NEXT) test strip  blood glucose monitoring (PAT MICROLET) lancets  Continuous Blood Gluc Sensor (FREESTYLE LISA 2 SENSOR) MISC  cyclobenzaprine (FLEXERIL) 10 MG tablet  diphenhydrAMINE (BENADRYL ALLERGY) 25 MG capsule  estradiol (VAGIFEM) 10 MCG TABS vaginal tablet  famotidine (PEPCID) 40 MG/5ML suspension  fluconazole (DIFLUCAN) 40 MG/ML suspension  glucagon 1 MG kit  glucose 40 % GEL gel  ibuprofen (ADVIL/MOTRIN) 400 MG tablet  insulin syringe-needle U-100 (30G X 1/2\" 0.3 ML) 30G X 1/2\" 0.3 ML miscellaneous  LACTATED RINGERS IV  Lansoprazole " (PREVACID IV)  levothyroxine (SYNTHROID/LEVOTHROID) 137 MCG tablet  Lidocaine (LIDOCARE) 4 % Patch  methocarbamol (ROBAXIN) 750 MG tablet  montelukast (SINGULAIR) 10 MG tablet  Nutritional Supplements (BOOST HIGH PROTEIN) LIQD  prochlorperazine (COMPAZINE) 10 MG tablet  promethazine-phenylephrine (PROMETHAZINE VC) 6.25-5 MG/5ML syrup  scopolamine (TRANSDERM) 1 MG/3DAYS 72 hr patch  scopolamine (TRANSDERM) 1 MG/3DAYS 72 hr patch  sennosides (SENOKOT) 8.8 MG/5ML syrup  Sharps Container (BD SHARPS ) MISC  STATIN NOT PRESCRIBED (INTENTIONAL)  zinc oxide - white petrolatum (CRITIC-AID THICK MOIST BARRIER) 20-51% PSTE topical paste  ZOLMitriptan (ZOMIG-ZMT) 5 MG ODT      Allergies   Allergen Reactions     Apple Juice Anaphylaxis     Corticosteroids Other (See Comments)     All oral, IV and injectable steroids are contraindicated (unless in life threatening situations) in [facility] transplant recipients. They can cause irreversible loss of islet cell function. Please contact patient's transplant care coordinator ADI Gaffney RN at [phone]/pager [phone] and/or endocrinologist prior to administration.       Depakote [Divalproex Sodium] Other (See Comments)     Chest pain     Zithromax [Azithromycin Dihydrate] Anaphylaxis     Noted in 4/7/08 ER     Bromfenac      Other reaction(s): Headache     Codeine      Other reaction(s): Hallucinations     Darvocet [Propoxyphene N-Apap] Itching     Morphine Nausea and Vomiting and Rash     Nalbuphine Hcl Rash     RASH :nubaine     Zosyn [Piperacillin-Tazobactam In Dex] Rash     Possible allergy, did have a diffuse rash that seemed drug related but could have also been related to soap in the hospital.      Bactrim [Sulfamethoxazole W-Trimethoprim] Other (See Comments) and Nausea and Vomiting     Severely low liver function.     Statins Other (See Comments)     Previous liver issues, risks vs benefits felt to not warrant statin.  Discussed Oct 2022 visit     Tape  [Adhesive Tape] Blisters     Tramadol      Zofran [Ondansetron] Other (See Comments)     migraine     Flagyl [Metronidazole] Hives and Rash     Family History  Family History   Problem Relation Age of Onset     Lipids Mother      Hypertension Mother      Thyroid Disease Mother      Depression Mother      Angina Mother      GERD Mother      Skin Cancer Mother      Migraines Mother      Autoimmune Disease Mother      Hyperlipidemia Mother      Mental Illness Mother      Eye Disorder Father         cataract, detached retina     Myocardial Infarction Father 60     Lipids Father      Cerebrovascular Disease Father      Depression Father      Substance Abuse Father      Anesthesia Reaction Father         stroke right after surgery     Cataracts Father      Osteoarthritis Father      Ulcerative Colitis Father      Autoimmune Disease Father      Heart Disease Father      Hyperlipidemia Father      Mental Illness Father      Interpersonal Violence Sister      Coronary Artery Disease Sister         angioplasty     GERD Sister      Substance Abuse Sister      Depression Sister      Thyroid Disease Sister      Eye Disorder Maternal Grandmother         cataract     Thyroid Disease Maternal Grandmother      Diabetes Maternal Grandfather      Eye Disorder Paternal Grandmother         cataract     Diabetes Paternal Grandmother      Eye Disorder Paternal Grandfather         cataract     Diabetes Paternal Grandfather      Substance Abuse Paternal Grandfather      Eye Disorder Son         ptosis     Depression Son      Anxiety Disorder Son      Heart Disease Paternal Aunt      Diabetes Paternal Aunt      Diabetes Paternal Uncle      Heart Disease Paternal Uncle      Depression Nephew      Anxiety Disorder Nephew      Thyroid Disease Nephew      Diabetes Type 2  Cousin         paternal cousin     Autoimmune Disease Cousin      Autoimmune Disease Sister      Depression Sister      Mental Illness Sister      Substance Abuse Sister       "Thyroid Disease Sister      Depression Son      Mental Illness Son      Thyroid Disease Nephew      Social History   Social History     Tobacco Use     Smoking status: Former     Packs/day: 0.50     Years: 6.00     Pack years: 3.00     Types: Cigarettes     Start date: 1985     Quit date: 1992     Years since quittin.3     Tobacco comments:     no 2nd hand   Substance Use Topics     Alcohol use: Not Currently     Alcohol/week: 3.0 - 6.0 standard drinks of alcohol     Types: 1 - 2 Glasses of wine, 1 - 2 Cans of beer, 1 - 2 Shots of liquor per week     Comment: none since IGG     Drug use: No         A medically appropriate review of systems was performed with pertinent positives and negatives noted in the HPI, and all other systems negative.    Physical Exam   BP: (!) 144/91  Pulse: 90  Temp: 97.8  F (36.6  C)  Resp: 18  Height: 172.7 cm (5' 8\")  Weight: 57.6 kg (127 lb)  SpO2: 98 %  Physical Exam  GEN: Well appearing, non toxic, cooperative  HEENT: normocephalic and atraumatic, PERRLA, EOMI, dry tacky mucous membranes  CV: well-perfused, normal skin color for ethnicity, regular rate and rhythm  PULM: breathing comfortably, in no respiratory distress, clear to auscultation bilaterally  ABD: nondistended, DJN G-tube in place without surrounding erythema, discharge, tenderness to palpation especially in the periumbilical region, but otherwise diffusely, no peritonitis  EXT: Full range of motion.  No edema.  NEURO: awake, conversant, grossly normal bilateral upper and lower extremity strength & ROM   SKIN: No rashes, ecchymosis, or lacerations  PSYCH: Calm and cooperative, interactive      ED Course, Procedures, & Data      Procedures       ED Course Selections:        EKG Interpretation:      Interpreted by Suzette Webster MD  Time reviewed: 4  Symptoms at time of EKG: abdominal pain   Rhythm: normal sinus   Rate: normal  Axis: normal  Ectopy: none  Conduction: normal  ST Segments/ T Waves: No ST-T wave " changes  Q Waves: none  Comparison to prior: No old EKG available    Clinical Impression: normal EKG       Results for orders placed or performed during the hospital encounter of 05/08/23   EKG 12-lead, tracing only     Status: None (Preliminary result)   Result Value Ref Range    Systolic Blood Pressure  mmHg    Diastolic Blood Pressure  mmHg    Ventricular Rate 77 BPM    Atrial Rate 77 BPM    NE Interval 146 ms    QRS Duration 78 ms     ms    QTc 470 ms    P Axis 57 degrees    R AXIS 18 degrees    T Axis 41 degrees    Interpretation ECG Sinus rhythm  Normal ECG        Medications   lactated ringers BOLUS 1,000 mL (has no administration in time range)   ondansetron (ZOFRAN) injection 4 mg (has no administration in time range)   diphenhydrAMINE (BENADRYL) injection 25 mg (has no administration in time range)   HYDROmorphone (DILAUDID) injection 1 mg (has no administration in time range)     Labs Ordered and Resulted from Time of ED Arrival to Time of ED Departure - No data to display  CT Abdomen Pelvis w Contrast    (Results Pending)          Critical care was not performed.     Medical Decision Making  The patient's presentation was of moderate complexity (an acute illness with systemic symptoms).    The patient's evaluation involved:  review of external note(s) from 3+ sources (see separate area of note for details)  ordering and/or review of 3+ test(s) in this encounter (see separate area of note for details)  review of 3+ test result(s) ordered prior to this encounter (see separate area of note for details)    The patient's management necessitated high risk (a decision regarding hospitalization).      Assessment & Plan    58-year-old female with a history of chronic pain, GERD, baseline G and J-tube dependence, recent perforated jejunum at the J-tube site and recent EGD findings consistent with Candida esophagitis presenting to the emergency department due to p.o. intolerance, nausea, vomiting and diarrhea  as well as increasing abdominal pain from baseline noted to be tender especially in the periumbilical region, but generally throughout the entire abdomen.    Concern at this point in time for underlying p.o. intolerance, and in the setting of this, her doctor had recommended admission for TPN induction.  At this point however, I do have concerns about her abdominal exam we will plan for CT imaging of her abdomen to ensure no evidence of any perfect viscus, abscess, or less likely SBO.  No indication at this time for empiric antibiotics with low suspicion for sepsis.    We will however give pain medication and antiemetics.    Pt admitted for PO intolerance pending CT results    I have reviewed the nursing notes. I have reviewed the findings, diagnosis, plan and need for follow up with the patient.    New Prescriptions    No medications on file       Final diagnoses:   History of food intolerance   Nausea and vomiting, unspecified vomiting type   Malnutrition, unspecified type (H)       Suzette Webster MD  [facility] EMERGENCY DEPARTMENT  5/8/2023     Suzette Webster MD  05/10/23 1222

## 2023-05-09 NOTE — PROVIDER NOTIFICATION
Gold 11 paged at [phone]  could GI please get consulted, pt regular GI told her she needed to come here through the ER to get a work up for GI.

## 2023-05-09 NOTE — PHARMACY-CONSULT NOTE
Pharmacy Tube Feeding Consult    Medication reviewed for administration by feeding tube and for potential food/drug interactions.    Recommendation: Recommend changing the following medications to a liquid dosage form: diphenhydramine (done for you)  Consider changing pantoprazole from IV formulation to suspension when able.   Remaining tablets are able to be crushed.    Pharmacy will continue to follow as new medications are ordered.    Yoana Walker, PharmD  Pharmacy Resident

## 2023-05-09 NOTE — PROGRESS NOTES
Medication Scribe Admission Medication History    Admission medication history is complete. The information provided in this note is only as accurate as the sources available at the time of the update.    Medication reconciliation/reorder completed by provider prior to medication history? No    Information Source(s): Patient via in-person    Pertinent Information: N/A    Changes made to PTA medication list:    Added: None    Deleted: Acarbose (PRECOSE) 100MG Tablet-ORAL: 1 Tablet TID                        Lidocaine 4% (LIDOCARE) Patch-TRANSDERMAL: 1 patch onto skin Q24Hrs                        Scopolamine (TRANSDERM) 1MG/3 DAYS 72Hr Patch-TRANSDERMAL: 1 Pacth onto skin Q72Hrs    Changed: None    Medication Affordability:  Not including over the counter (OTC) medications, was there a time in the past 3 months when you did not take your medications as prescribed because of cost?: No    Allergies reviewed with patient and updates made in EHR: yes    Medication History Completed By: Lorena Yañez 5/9/2023 1:04 PM    Prior to Admission medications    Medication Sig Last Dose Taking? Auth Provider Long Term End Date   acetaminophen (TYLENOL) 325 MG/10.15ML solution 20.3 mLs (650 mg) by Per J Tube route every 6 hours as needed for mild pain or fever 5/9/2023 Yes Elver Bauer MD     Acetaminophen Childrens 160 MG/5ML SUSP TAKE 20.312ML (650MG) BY MOUTH EVERY 6 HOURS AS NEEDED FOR FEVER OR MILD PAIN Past Week at 9AM Yes Elver Bauer MD     amylase-lipase-protease (CREON) 81553-17840 units CPEP per EC capsule Take 3-4 capsules by mouth 3 times daily (with meals) With oral meals Past Week at AM Yes Rosanne Marti PA-C No    blood glucose (PAT CONTOUR NEXT) test strip Use to test blood sugar 6 times daily or as directed.  Yes Gloria Melissa MD     blood glucose monitoring (APT MICROLET) lancets Use to test blood sugar 6-8 times daily or as directed.  Yes Gloria Melissa MD    "  Continuous Blood Gluc Sensor (FREESTYLE LISA 2 SENSOR) [facility] USE ONE SENSOR ONCE EVERY 14 DAYS 5/8/2023 Yes Gloria Melissa MD     diphenhydrAMINE (BENADRYL ALLERGY) 25 MG capsule Take 1 capsule (25 mg) by mouth every 6 hours as needed for itching or allergies 5/9/2023 at AM Yes Nestor Phoenix MD     famotidine (PEPCID) 40 MG/5ML suspension 2.5 mLs (20 mg) by Per J Tube route daily 5/8/2023 at AM Yes Mandeep Park MD No    fluconazole (DIFLUCAN) 40 MG/ML suspension Take 10 mLs (400 mg) by mouth daily for 7 days 5/9/2023 at AM Yes Mandeep Park MD  5/15/23   glucagon 1 MG kit Give 0.1 to 0.15mg( 10-15 units on Insulin sryinge) subcutaneous  every 15 minutes PRN for hypoglycemia. Remix new kit q24hr. Needs up to 3 kit/week. More than a month at PRN Yes Gloria Melissa MD Yes    glucose 40 % GEL gel Take 15-30 g by mouth every 15 minutes as needed for low blood sugar More than a month at PRN Yes Melissa Sood PA-C     ibuprofen (ADVIL/MOTRIN) 400 MG tablet Take 1 tablet (400 mg) by mouth every 6 hours as needed for moderate pain 5/8/2023 at 12PM Yes Altagracia العلي PA-C No    insulin syringe-needle U-100 (30G X 1/2\" 0.3 ML) 30G X 1/2\" 0.3 ML miscellaneous Use 3 syringes daily or as directed.  Yes Gloria Melissa MD     LACTATED RINGERS IV Inject 1 L into the vein daily 5/8/2023 Yes Reported, Patient     levothyroxine (SYNTHROID/LEVOTHROID) 137 MCG tablet 137 mcg by Per G Tube route daily 5/9/2023 at AM Yes Unknown, Entered By History Yes    methocarbamol (ROBAXIN) 750 MG tablet 1 tablet (750 mg) by Per Feeding Tube route 4 times daily 5/9/2023 at AM Yes Monika Mahajan MD     Nutritional Supplements (BOOST HIGH PROTEIN) LIQD After above baseline labs are drawn, give: 6 mL/kg to maximum of 360 mL; the beverage is to be consumed within 5 minutes.  Yes Gloria Melissa MD No    prochlorperazine (COMPAZINE) 10 MG tablet TAKE 1/2 TABLET(5 MG) BY MOUTH EVERY 6 HOURS AS NEEDED FOR " NAUSEA OR VOMITING  Patient taking differently: 20 mg 5/9/2023 at AM Yes Vijaya Genao MD     promethazine-phenylephrine (PROMETHAZINE VC) 6.25-5 MG/5ML syrup 20 mLs (25 mg) by Per Feeding Tube route nightly as needed for nausea - Per Feeding Tube 5/8/2023 at PM Yes Elver Bauer MD     Sharps Container (BD SHARPS ) MISC 1 Container as needed  Yes Melissa Sood PA-C     zinc oxide - white petrolatum (CRITIC-AID THICK MOIST BARRIER) 20-51% PSTE topical paste Apply 71 g topically every hour as needed for rash (Under J tube bumper when needed for skin protection) 5/9/2023 at AM Yes Josse Dao MD     cyclobenzaprine (FLEXERIL) 10 MG tablet 1 tablet (10 mg) by Per G Tube route 2 times daily as needed for muscle spasms 5/7/2023 at PM  Elver Bauer MD No    estradiol (VAGIFEM) 10 MCG TABS vaginal tablet INSERT 1 TABLET(10 MCG) VAGINALLY 2 TIMES A WEEK 5/3/2023 at PM  Juan Jose Gomez MD     Lansoprazole (PREVACID IV) Inject into the vein daily 5/7/2023 at PM  Reported, Patient No    montelukast (SINGULAIR) 10 MG tablet 10 mg by Per G Tube route At Bedtime 5/7/2023 at PM  Unknown, Entered By History No    sennosides (SENOKOT) 8.8 MG/5ML syrup 5 mLs by Per J Tube route 2 times daily 5/3/2023 at PM  Elver Bauer MD     STATIN NOT PRESCRIBED (INTENTIONAL) Previous liver issues, risks vs benefits felt to not warrant statin.    Discussed Oct 2022 visit   Rossi Andrade MD Yes    ZOLMitriptan (ZOMIG-ZMT) 5 MG ODT Take 1 tablet (5 mg) by mouth at onset of headache for migraine May repeat in 2 hours. Max 2 tablets/24 hours. 5/6/2023 at AM  Rachelle العلي APRN CNP Yes

## 2023-05-09 NOTE — PROGRESS NOTES
Called [facility] to follow-up on receipt of fax. Medical records representative confirmed receipt.      Faxed:   1- Referral summary sent via Email   2- ADT Demographic Sheet   3- Imaging: CT report from January 2023 (CT ABDOMEN W/O CONTRAST) and December 2022 (CT ABDOMEN PELVIS W CONTRAST)  4- Clinic notes:   4/26/23: Dr. Mandeep Park   4/18/23: Dr. Monika Mahajan   3/30/34: Dr. Elver Bauer   3/6/23: Dr. Juan Jose Gomez   2/22/23: Dr. Elver Bauer   2/14/23: Dr. Elijah Baugh   2/8/23: Dr. Vijaya Genao   1/26/23: Dr. Elver Bauer    1/19/23: Dr. Elver Bauer   1/12/23: Dr. Elver Bauer    12/22/22: Dr. Elver Bauer   12/1/22: Dr. Elver Bauer   11/2/22: Dr. Mandeep Park      Facility Information:  [facility]  Phone #: [phone]  Fax #: [phone]         SK

## 2023-05-09 NOTE — ED TRIAGE NOTES
Pt referred by PCP to ED from admission for intolerance to tube feeding characterized by abdominal pain, N/V/D.  Pt dienies bloody stools/urine/emesis.  8/10 abdominal pan and nausea endorsed.  VSS, pt is alert x 4 and in significant pain.     Triage Assessment     Row Name 05/08/23 2123       Triage Assessment (Adult)    Airway WDL WDL       Respiratory WDL    Respiratory WDL WDL       Skin Circulation/Temperature WDL    Skin Circulation/Temperature WDL WDL       Cardiac WDL    Cardiac WDL WDL       Peripheral/Neurovascular WDL    Peripheral Neurovascular WDL WDL       Cognitive/Neuro/Behavioral WDL    Cognitive/Neuro/Behavioral WDL WDL

## 2023-05-09 NOTE — PROGRESS NOTES
CLINICAL NUTRITION SERVICES - ASSESSMENT NOTE     Nutrition Prescription    RECOMMENDATIONS FOR MDs/PROVIDERS TO ORDER:  Once TPN begins, discontinue maintenance IVF as patient will get IVF with TPN.      Malnutrition Status:    Severe malnutrition in the context of chronic illness    Recommendations already ordered by Registered Dietitian (RD):  Parenteral Nutrition:  1. Dosing weight:  57.6 kg  2. Access: Port     3. Initial parameters (per day)  Volume:  1440 mL -- includes additional fluids for hydration  Dextrose: 80 g  AA: 55 g  Lipids: 250 mL 20%, 2 days per week     4. Dextrose titration:   Monitor lytes and if within acceptable parameters (Mg++ > or = 1.5, K+ is > or = 3, and PO4 > or = 1.9), increase dextrose by 35 g/day to goal of 150 g dextrose.    5. Additives: standard multivitamin and trace elements    Goal PN provides 150 g dextrose, 55 g AA, and 250 mL 20% lipids 2 days per week for total provision of 873 Kcals (15 Kcals/kg), 55 g/kg protein, GIR 1.8 mg/kg/minute, and 16% fat kcals on average daily.     Enteral Nutrition:  Vivonex T.E.N. @ goal 40 ml/hr (960 ml/day) to provide 960 kcals (16 kcal/kg/day), 37 g PRO (0.6 g/kg/day), 2.9 g fat, 198 g CHO and 792 ml free H2O    Recipe for NSA: Mix 4 packets Vivonex T.E.N. with 1000 ml free water to yeild 1200 ml formula.     ---> begin Vivonex at 30 ml/hr, increase to 40 ml/hr as patient agrees/tolerates    Do NOT need Relizorb with Vivonex due to very low fat content.     Additional:    Checking vitamin A, D, E, K, B12 and zinc deficiencies d/t reliance on enzyme replacement therapy; last checked over 1 year ago     Creon 24,000 3-4 capsules with meals (1666 units lipase/kg/meal -- within appropriate range)      Regular diet, PO for pleasure/as desired.     Thiamine enterally 100 mg daily x 5-7 days due to refeeding risk      Future/Additional Recommendations:    Eventual goal to adjust to cyclic TPN -- can be done in outpatient setting    Tolerance/rate  of TF     REASON FOR ASSESSMENT  Dinora Mcghee is a/an 58 year old female assessed by the dietitian for Pharmacy/Nutrition to Start and Manage PN and Provider Order - Registered Dietitian to Assess and Order TF per Medical Nutrition Therapy Protocol    Patient admitted for TPN initiation per EMR.     MEDICAL HISTORY  [facility] s/p cholecystectomy (), s/p multiple ERCP c/b recurrent pancreatitis, chronic abdominal pain, gastroparesis, chronic nausea and vomiting, chronic malnutrition/FTT on G-tube s/p multiple G/J-tube adjustments/laparotomy s/p TPIT (), Hx of GERD, hypothyroidism, uterine tumor s/p hysterectomy     NUTRITION HISTORY  Spoke with [facility] RD following this patient outpatient. Patient has tried multiple TF formulas and not tolerated them including: Peptamen 1.5, Vital 1.5, [employer] Peptide 1.5, Isosource 1.5, and Impact Peptide 1.5. Patient never able to get above 30-35 ml/hr d/t issues with nausea, diarrhea. Patient was most recently on Vivonex RTF with goal of 7 cartons (1750 ml/day) daily but never got above 30 ml/hr (720 ml/day; 41% of goal). 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 Per hospitals RD, patient didn't see any improvement in stooling with Relizorb on Vivonex so was not using Relizorb recently with Vivonex (very low fat content). Patient was not taking PO much d/t PO coming out of G tube.   Per I RD, patient mostly recently had GJ tube replaced 23.   Tube feeding history confirmed by patient. Patient reports most recently using peptamen 1.5 PTA but reports Vivonex was tolerated a bit better and confirmed she was not using Relizorb with Vivonex. She also reports taking some PO (mostly crackers) but often has to vent G tube with PO.     CURRENT NUTRITION ORDERS  Diet: Clear Liquid    Intake/Tolerance: No documented intakes to assess.    LABS    Last T (WNL) 3/06/23    Last vitamin A,D, E, B12 and zinc levels  "from Feb 2022 05/09/23 00:59 05/09/23 07:24   Sodium 141 142   Potassium 3.9 3.9   Chloride 104 103   Carbon Dioxide (CO2) 27 28   Urea Nitrogen 6.3 6.5   Creatinine 0.49 (L) 0.49 (L)   GFR Estimate >90 >90   Calcium 9.3 9.2   Anion Gap 10 11   Magnesium 2.2    Phosphorus 3.2    Albumin 4.2 4.1   Protein Total 6.9 6.8   Alkaline Phosphatase 123 (H) 119 (H)   ALT 67 (H) 65 (H)   AST 27 36 (H)   Bilirubin Total 0.5 0.5   Glucose 122 (H) 128 (H)   Ketone Quantitative 0.40 (H)    Lactic Acid 0.6 (L)    Lipase 5 (L)        MEDICATIONS    Creon 24 3-4 capsules with meals TID    protonix    Senokot BID via J tube    IVF LR @ 100 ml/hr    Compazine PRN    ANTHROPOMETRICS  Height: 172.7 cm (5' 8\")  Most Recent Weight: 57.6 kg (127 lb)    IBW: 63.6 kg  Body mass index is 19.31 kg/m . BMI Category: Normal BMI  Weight History: 5.9 kg (12.6%) weight loss over 5 months.  Wt Readings from Last 15 Encounters:   05/08/23 57.6 kg (127 lb)   05/04/23 58.6 kg (129 lb 3 oz)   03/30/23 60.8 kg (134 lb)   03/06/23 60.9 kg (134 lb 3.2 oz)   02/22/23 61.3 kg (135 lb 3.2 oz)   02/08/23 62.2 kg (137 lb 3.2 oz)   02/04/23 60.1 kg (132 lb 9.6 oz)   01/26/23 63.4 kg (139 lb 12.8 oz)   01/19/23 60.8 kg (134 lb 1.6 oz)   01/12/23 61.4 kg (135 lb 6.4 oz)   01/04/23 63.8 kg (140 lb 11.2 oz)   12/22/22 65.2 kg (143 lb 12.8 oz)   12/09/22 65.9 kg (145 lb 4.5 oz)   12/01/22 65.6 kg (144 lb 9.6 oz)   11/22/22 64.5 kg (142 lb 3.2 oz)       ASSESSED NUTRITION NEEDS  Dosing Weight: 57.6 kg (Actual BW)   Estimated Energy Needs: 7956-9444 kcals/day (25 - 30 kcals/kg)  Justification: Maintenance  Estimated Protein Needs: 69-86 grams protein/day (1.2 - 1.5 grams of pro/kg)  Justification: Maintenance  Estimated Fluid Needs: 6821-5626 mL/day (1 mL/kcal)   Justification: Maintenance    PHYSICAL FINDINGS  See malnutrition section below.    MALNUTRITION  % Intake: </=50% for >/= 1 month (severe)  % Weight Loss: > 10% in 6 months (severe " malnutrition)  Subcutaneous Fat Loss: Facial region:  mild and Lower arm:  mild   Muscle Loss: Temporal:  mild, Facial & jaw region:  mild, Thoracic region (clavicle, acromium bone, deltoid, trapezius, pectoral):  severe and Dorsal hand:  mild  Fluid Accumulation/Edema: None noted  Malnutrition Diagnosis: Severe malnutrition in the context of chronic illness    NUTRITION DIAGNOSIS  Inadequate energy intake related to tube feeding and oral intake intolerance as evidenced by patient not able to reach goal TF rate, nausea/diarrhea with multiple TF formulas, weight loss, fat and muscle loss.       INTERVENTIONS  Implementation  Nutrition Education: will be provided if nutrition education needs arise.    Collaboration with other nutrition professionals  Collaboration with other providers  Enteral Nutrition - initiate  Feeding tube flush  Multi-trace element supplement therapy  Multivitamin/mineral supplement therapy  Parenteral Nutrition/IV Fluids - Initiate     Goals  Total avg nutritional intake to meet a minimum of 25 kcal/kg and 1.2 g PRO/kg daily (per dosing wt 57.6 kg).        Monitoring/Evaluation  Progress toward goals will be monitored and evaluated per protocol.    Holly Moura RDN, LD  6D/ED RD pager: [phone]  Weekend/Holiday RD pager: [phone]

## 2023-05-09 NOTE — CONSULTS
[facility]    GASTROENTEROLOGY CONSULTATION    Date of Admission:  5/8/2023    ASSESSMENT AND RECOMMENDATIONS:   58 year old female with chronic pancreatitis (post ERCP pancreatitis 1996) s/p TPIAT 12/2016 with diabetes post operation, gastroparesis with feeding tube dependent (PEG/J tube) who was admitted with abdominal pain.    # Gastroparesis, PEG and J-tube dependent  # Delayed small bowel transit  # Visceral hypersensitivity  # Protein-calories malnutrition  Follows with Dr. Park and is now admitted for initiation of TPN. Has complex abdominal pain history which she eventually not tolerates feeding due to pain during feeding. Has tried multiple neuromodulators, prokinetics in the past with some interruptions during hospitalization.    # Chronic pancreatitis s/p TPIAT 2016 with diabetes and exocrine insufficiency    RECOMMENDATIONS  - Initiate TPN as outpatient plan, appreciate care from medicine and dietitian  - GI is reaching out to Dr. Genao for restarting the neuromodulator (amytriptyline, and +/- prucalopride) given pain predominant symptom.     Gastroenterology follow up recommendations: Follow-up scheduled with Dr. Genao 9/2023    Thank you for involving us in this patient's care. Please do not hesitate to contact the GI service with any questions or concerns.     Patient care plan discussed with Dr. Dodson, GI staff physician.    Maren Flores MD  GI fellow          Chief Complaint:   We were asked to evaluate this patient with pt of Dr Park acute on chronic pancreatitis not tolerating tube feeds.  History is obtained from the patient and the medical record.          History of Present Illness:   Dinora Mcghee is a 58 year old female with chronic pancreatitis (post ERCP pancreatitis 1996) s/p TPIAT 12/2016 with diabetes post operation, gastroparesis with feeding tube dependent (PEG/J tube) who was admitted with abdominal pain.      Patient has complicated GI history as below with recent PEG  replacement and J tube exchange 5/4 with Dr. Park. She is unable to tolerate any diet and was sent from outpatient clinic for TPN initiation. Having pain at PEG tube at 2/10 after acetaminophen and advil (taking daily), on occasional diludid ~ once a week. Also taking Robaxin and flexeril for pain. Having loose stool 7 times this morning with increased tubefeeding to 25 mL/hr.     Complicated GI history  - 5/4/23 since PEG tube placement, had multiple balloon broken, so underwent gastrostomy tube replacement. The multiple clips in the antrum were thought to be the cause of balloon breakage. J tube was exchanged from 12 F to 14 F, with balloon dilated to 5 mL.  - 1/31/23 underwent EGD with PEG, and dilation of jejunostomy feeding tube. Abdominal pain post procedure, and found to have erforated jejunum at jejunostomy site into mesentery s/p jejunal resection same day.  - 12/30/22 explore lap with extensive lysis adhesion, small bowel resection, jejunostomy feeding tube placement ([facility], Dr. Good)  -- Complicated GJ ad J tube placement, displacement, pain with tubes  - 9/2021 GJ tube placement  - Late 2020 started having weight lost and post prandial pain.   - 2016 TPIAT for chronic pancreatitis    Medications taken in the past (no longer taking):  Amitriptyline (Elavil)  Citalopram (Celexa, Lexapro)  Duloxetine (Cymbalta)-not helping started 2/2022-6/2022  Mirtazapine (Remeron)  Sertraline (Zoloft)  Trazodone (Desyrel)  --  Motregrety 9/2021-5/2022  Linaclotide  Amitizia    NSAID use: advil daily for abdominal pain  Prior endoscopy:  Reviewed.          Past Medical History:   Reviewed and edited as appropriate  Past Medical History:   Diagnosis Date     Allergic rhinitis, cause unspecified      Allergy to other foods     strawberries, apples, celeries, alice, watermelon     Arthritis     left knee     Choledocholithiasis     long after cholecystectomy     Chronic abdominal pain      Chronic constipation       Chronic nausea      Chronic pancreatitis (H)      Degeneration of lumbar or lumbosacral intervertebral disc      Esophageal reflux     w/ hiatal hernia     Gastroparesis      Hiatal hernia      History of pituitary adenoma     s/p resection     Hypothyroidism      Migraines      Mild hyperlipidemia      On tube feeding diet     presence of GJ tube     Pancreatic disease     PD stricture, 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 endometriosis growth     APPENDECTOMY        SECTION       COLONOSCOPY       ENDOSCOPIC INSERTION TUBE GASTROSTOMY N/A 2021    Procedure: Gastrojejonostomy placement with jejunopexy, PEG tube exchange;  Surgeon: Zackery Montoya MD;  [facility]     ENDOSCOPIC RETROGRADE CHOLANGIOPANCREATOGRAM N/A 2015    Procedure: ENDOSCOPIC RETROGRADE CHOLANGIOPANCREATOGRAM;  Surgeon: Mandeep Park MD;  Location:  OR     ENDOSCOPIC RETROGRADE CHOLANGIOPANCREATOGRAM N/A 2016    Procedure: COMBINED ENDOSCOPIC RETROGRADE CHOLANGIOPANCREATOGRAPHY, PLACE TUBE/STENT;  Surgeon: Mandeep Park MD;  Location:  OR     ENDOSCOPIC RETROGRADE CHOLANGIOPANCREATOGRAM N/A 3/17/2016    Procedure: COMBINED ENDOSCOPIC RETROGRADE CHOLANGIOPANCREATOGRAPHY, REMOVE FOREIGN BODY OR STENT/TUBE;  Surgeon: Mandeep Park MD;  Location:  OR     ENDOSCOPIC RETROGRADE CHOLANGIOPANCREATOGRAM N/A 2016    Procedure: COMBINED ENDOSCOPIC RETROGRADE CHOLANGIOPANCREATOGRAPHY, PLACE TUBE/STENT;  Surgeon: Mandeep Park MD;  Location:  OR     ENDOSCOPIC RETROGRADE CHOLANGIOPANCREATOGRAM N/A  8/26/2016    Procedure: COMBINED ENDOSCOPIC RETROGRADE CHOLANGIOPANCREATOGRAPHY, REMOVE FOREIGN BODY OR STENT/TUBE;  Surgeon: Mandeep Park MD;  [facility]     ENDOSCOPIC ULTRASOUND UPPER GASTROINTESTINAL TRACT (GI) N/A 10/3/2016    Procedure: ENDOSCOPIC ULTRASOUND, ESOPHAGOSCOPY / UPPER GASTROINTESTINAL TRACT (GI);  Surgeon: Guru Jose Rodas MD;  [facility]     ENTEROSCOPY SMALL BOWEL N/A 2/3/2022    Procedure: ENTEROSCOPY with possible fistula closure;  Surgeon: Francisco Dodson MD;  Location:  GI     ESOPHAGOSCOPY, GASTROSCOPY, DUODENOSCOPY (EGD), COMBINED N/A 6/24/2015    Procedure: COMBINED ESOPHAGOSCOPY, GASTROSCOPY, DUODENOSCOPY (EGD), REMOVE FOREIGN BODY;  Surgeon: Mandeep Park MD;  Location: [facility] GI     ESOPHAGOSCOPY, GASTROSCOPY, DUODENOSCOPY (EGD), COMBINED N/A 10/25/2015    Procedure: COMBINED ESOPHAGOSCOPY, GASTROSCOPY, DUODENOSCOPY (EGD);  Surgeon: Sammy Amaro MD;  Location:  GI     ESOPHAGOSCOPY, GASTROSCOPY, DUODENOSCOPY (EGD), COMBINED N/A 10/25/2015    Procedure: COMBINED ESOPHAGOSCOPY, GASTROSCOPY, DUODENOSCOPY (EGD), BIOPSY SINGLE OR MULTIPLE;  Surgeon: Sammy Amaro MD;  Location:  GI     ESOPHAGOSCOPY, GASTROSCOPY, DUODENOSCOPY (EGD), COMBINED N/A 1/31/2023    Procedure: ESOPHAGOGASTRODUODENOSCOPY (EGD) with peg replacement ;  Surgeon: Mandeep Park MD;  [facility]     ESOPHAGOSCOPY, GASTROSCOPY, DUODENOSCOPY (EGD), DILATATION, COMBINED       EXCISE LESION TRUNK N/A 4/17/2017    Procedure: EXCISE LESION TRUNK;  Removal of Abdominal Foreign Body;  Surgeon: Nestor Phoenix MD;  [facility]     HC ESOPH/GAS REFLUX TEST W NASAL IMPED >1 HR N/A 11/19/2015    Procedure: ESOPHAGEAL IMPEDENCE FUNCTION TEST WITH 24 HOUR PH GREATER THAN 1 HOUR;  Surgeon: Thiago Apple MD;  [facility] GI     HC UGI ENDOSCOPY DIAG W BIOPSY  9/17/08     HC UGI ENDOSCOPY DIAG W BIOPSY  9/27/12     HC UGI ENDOSCOPY W ESOPHAGEAL  DILATION BALLOON <30MM  9/17/08      UGI ENDOSCOPY W EUS N/A 5/5/2015    Procedure: COMBINED ENDOSCOPIC ULTRASOUND, ESOPHAGOSCOPY, GASTROSCOPY, DUODENOSCOPY (EGD);  Surgeon: Wm Dueñas MD;  [facility] GI     HC WRIST ARTHROSCOP,RELEASE XVERS LIG Bilateral 12/17/08     INJECT TRANSVERSUS ABDOMINIS PLANE (TAP) BLOCK BILATERAL Left 9/22/2016    Procedure: INJECT TRANSVERSUS ABDOMINIS PLANE (TAP) BLOCK BILATERAL;  Surgeon: Dickson Corrigan MD;  [facility]     INJECT TRIGGER POINT Bilateral 9/8/2022    Procedure: Abdominal trigger point injection with ultrasound;  Surgeon: Monika Mahajan MD;  [facility]     INJECT TRIGGER POINT SINGLE / MULTIPLE 3 OR MORE MUSCLES Right 11/15/2022    Procedure: Trigger point injections right abdomen with ultrasound guidance;  Surgeon: Monika Mahajan MD;  [facility]: [facility]     IR CHEST PORT PLACEMENT < 5 YRS OF AGE  6/10/2022     laparoscopic pineda  1995     LAPAROSCOPIC HERNIORRHAPHY INCISIONAL N/A 8/23/2018    Procedure: LAPAROSCOPIC HERNIORRHAPHY INCISIONAL;  Laparoscopic Incisional Hernia Repair with Symbotex Mesh Implant;  Surgeon: Nestor Phoenix MD;  [facility]     LAPAROSCOPIC PANCREATECTOMY, TRANSPLANT AUTO ISLET CELL N/A 12/28/2016    Procedure: LAPAROSCOPIC PANCREATECTOMY, TRANSPLANT AUTO ISLET CELL;  Surgeon: Nestor Phoenix MD;  [facility]     LAPAROTOMY EXPLORATORY N/A 1/31/2023    Procedure: Exploratory Laparotomy, lysis of adhesions, Perforated J-Junostomy Resection, Replace J-Junostomy site;  Surgeon: Elver aBuer MD;  [facility]     LAPAROTOMY, LYSIS ADHESIONS, COMBINED N/A 1/31/2023    Procedure: Dilatation of jejunostomy feeding tube, track with placement of jejunostomy tube with fluoroscopy;  Surgeon: Elver Bauer MD;  [facility]     REMOVE AND REPLACE BREAST IMPLANT PROSTHESIS N/A 12/30/2022    Procedure: Exploratory Laparotomy, lysis of adhesions, small bowel resection. Placement of gastric  jejunostomy for enteral feeding.;  Surgeon: Elver Bauer MD;  [facility]     REMOVE GASTROSTOMY TUBE ADULT N/A 2022    Procedure: REMOVAL, GASTROSTOMY TUBE, ADULT;  Surgeon: Mandeep Park MD;  [facility]     REPLACE GASTROJEJUNOSTOMY TUBE, PERCUTANEOUS N/A 2021    Procedure: GASTROJEJUNOSTOMY TUBE PLACEMENT, PERCUTANEOUS, WITH GASTROPEXY;  Surgeon: Mandeep Park MD;  [facility]     REPLACE GASTROJEJUNOSTOMY TUBE, PERCUTANEOUS N/A 2021    Procedure: REPLACEMENT, GASTROJEJUNOSTOMY TUBE, PERCUTANEOUS;  Surgeon: Zackery Montoya MD;  [facility]     REPLACE GASTROJEJUNOSTOMY TUBE, PERCUTANEOUS N/A 2022    Procedure: REPLACEMENT, GASTROJEJUNOSTOMY TUBE, PERCUTANEOUS;  Surgeon: Mandeep Park MD;  [facility]     REPLACE GASTROJEJUNOSTOMY TUBE, PERCUTANEOUS N/A 2022    Procedure: REPLACEMENT, GASTROJEJUNOSTOMY TUBE, PERCUTANEOUS with ENDOSCOPIC SUTURING.;  Surgeon: Mandeep Park MD;  [facility]     REPLACE JEJUNOSTOMY TUBE, PERCUTANEOUS N/A 9/10/2021    Procedure: UPPER ENDOSCOPY, REPLACEMENT OF PERCUTANEOUS GASTROJEJUNOSTOMY TUBE, T-TAG GASTROPEXY;  Surgeon: Zackery Montoya MD;  [facility]     REPLACE JEJUNOSTOMY TUBE, PERCUTANEOUS N/A 2021    Procedure: REPLACEMENT, JEJUNOSTOMY TUBE, PERCUTANEOUS;  Surgeon: Mandeep Park MD;  [facility]     REPLACE JEJUNOSTOMY TUBE, PERCUTANEOUS N/A 2023    Procedure: REPLACEMENT, JEJUNOSTOMY TUBE, PERCUTANEOUS;  Surgeon: Mandeep Park MD;  [facility]     transphenoidal pituitary resection       ZZC  DELIVERY ONLY       ZZC  DELIVERY ONLY      repeat c section with incidental cystotomy with repair     ZZC EXCIS PITUITARY,TRANSNASAL/SEPTAL      pituitary tumor removed for diabetes insipidus     ZZC TOTAL ABDOM HYSTERECTOMY      w/ bilateral salpingoophorectomy             Social History:   Reviewed  and edited as appropriate  Social History     Socioeconomic History     Marital status:      Spouse name: Norris     Number of children: 3     Years of education: 16     Highest education level: Not on file   Occupational History     Occupation: director     Employer: [employer]   Tobacco Use     Smoking status: Former     Packs/day: 0.50     Years: 6.00     Pack years: 3.00     Types: Cigarettes     Start date: 1985     Quit date: 1992     Years since quittin.3     Smokeless tobacco: Not on file     Tobacco comments:     no 2nd hand   Vaping Use     Vaping status: Not on file   Substance and Sexual Activity     Alcohol use: Not Currently     Alcohol/week: 3.0 - 6.0 standard drinks of alcohol     Types: 1 - 2 Glasses of wine, 1 - 2 Cans of beer, 1 - 2 Shots of liquor per week     Comment: none since IGG     Drug use: No     Sexual activity: Not Currently     Partners: Male     Birth control/protection: Post-menopausal     Comment: Hystectomy    Other Topics Concern     Parent/sibling w/ CABG, MI or angioplasty before 65F 55M? No      Service Not Asked     Blood Transfusions No     Caffeine Concern Not Asked     Occupational Exposure Not Asked     Hobby Hazards Not Asked     Sleep Concern Not Asked     Stress Concern Not Asked     Weight Concern Not Asked     Special Diet Not Asked     Back Care Not Asked     Exercise Not Asked     Bike Helmet Not Asked     Seat Belt Yes     Self-Exams Not Asked   Social History Narrative     with 3 children and a dog.  No smoking, etoh or drug use.  Worked as a  for [employer] in [place] in the past.  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     Social Determinants of Health     Financial Resource Strain: Not on file   Food Insecurity: Not on file   Transportation Needs: Not on file   Physical Activity: Not on file   Stress: Not on file   Social Connections: Not on file   Intimate Partner Violence:  "Not on file   Housing Stability: Not on file            Family History:   Reviewed and edited as appropriate  No known history of gastrointestinal/liver disease or  gastrointestinal malignancies       Allergies:   Reviewed and edited as appropriate     Allergies   Allergen Reactions     Apple Juice Anaphylaxis     Corticosteroids Other (See Comments)     All oral, IV and injectable steroids are contraindicated (unless in life threatening situations) in [facility] transplant recipients. They can cause irreversible loss of islet cell function. Please contact patient's transplant care coordinator ADI Gaffney RN at [phone]/pager [phone] and/or endocrinologist prior to administration.       Depakote [Divalproex Sodium] Other (See Comments)     Chest pain     Zithromax [Azithromycin Dihydrate] Anaphylaxis     Noted in 4/7/08 ER     Bromfenac      Other reaction(s): Headache     Codeine      Other reaction(s): Hallucinations     Darvocet [Propoxyphene N-Apap] Itching     Morphine Nausea and Vomiting and Rash     Nalbuphine Hcl Rash     RASH :nubaine     Zosyn [Piperacillin-Tazobactam In Dex] Rash     Possible allergy, did have a diffuse rash that seemed drug related but could have also been related to soap in the hospital.      Bactrim [Sulfamethoxazole W-Trimethoprim] Other (See Comments) and Nausea and Vomiting     Severely low liver function.     Statins Other (See Comments)     Previous liver issues, risks vs benefits felt to not warrant statin.  Discussed Oct 2022 visit     Tape [Adhesive Tape] Blisters     Tramadol      Zofran [Ondansetron] Other (See Comments)     migraine     Flagyl [Metronidazole] Hives and Rash            Medications:   (Not in a hospital admission)            Review of Systems:     A complete review of systems was performed and is negative except as noted in the HPI           Physical Exam:   /77   Pulse 80   Temp 98.5  F (36.9  C) (Oral)   Resp 16   Ht 1.727 m (5' 8\")   " Wt 57.6 kg (127 lb)   SpO2 99%   BMI 19.31 kg/m    Wt:   Wt Readings from Last 2 Encounters:   05/08/23 57.6 kg (127 lb)   05/04/23 58.6 kg (129 lb 3 oz)      Constitutional: no acute distress  HEENT: Sclera anicteric  CV: No edema  Respiratory: Unlabored breathing  Abdomen: Non-distended, tender at lower abdomen, no peritoneal signs  Skin: warm, perfused  Neuro: AAO x 3         Data:   Labs and imaging below were independently reviewed and interpreted    Imaging: Reviewed  NM gastric emptying study   6/27/16 (prior to TPIAT  Percent retention at 60 min = 81%.  Percent retention at 120 min = 71%.  Percent retention at 180 min = 68%.  Percent retention at 240 min =49%.  T 1/2 emptying time =  238 mins.                                                       IMPRESSION: Delayed gastric emptying    8/2021  Percent retention at 60 min = 86%.  Percent retention at 120 min = 63%.  Percent retention at 180 min = 39%.  Percent retention at 240 min = 19%.  T 1/2 emptying time =  150 mins.                                IMPRESSION: Delayed gastric emptying

## 2023-05-09 NOTE — PROVIDER NOTIFICATION
Gold 11 paged at 0900   pt has 3 hrs of cont. tube feed left it is currently going at 30, could you place orders for this? and have dietary consulted she will need more Peptamen 1.5.

## 2023-05-09 NOTE — H&P
[facility]    History and Physical - Hospitalist Service, GOLD TEAM        Date of Admission:  5/8/2023    Assessment & Plan      Dinora Mcghee is a 58 year old female admitted on 5/8/2023. She has PMH of gallstone s/p cholecystectomy (1996), s/p multiple ERCP c/b recurrent pancreatitis, chronic abdominal pain, gastroparesis, chronic nausea and vomiting, chronic malnutrition/FTT on G-tube s/p multiple G/J-tube adjustments/laparotomy s/p TPIT (2016), Hx of GERD, hypothyroidism, uterine tumor s/p hysterectomy referred from the outpatient team for initiation of TPN    # Chronic intermittent pancreatitis  # Exacerbation of Chronic nausea and vomiting   # chronic intermittent diarrhea  # Worsening Chronic intermittent abdominal pain  # GERD  # Gastroparesis    Currently has ongoing nausea and vomiting with significant abdominal pain similar to her chronic symptoms but relatively severe, pain at 8/10; PTA on G-tube feeding but unable to tolerate increasing volume to the required 70 cc/h due to intractable nausea/vomiting and diarrhea; currently at 40 cc/h feeding but has lost significant weight from 144 pounds in January to current 127.  Also gets 1 L Ringer's lactate daily at home, has tried different nutrition supplements. has had multiple adjustments to G/J-tube due to malfunctioning, pain , and other complications; also had multiple related laparotomy including repair of small bowel perforation/resectioin and lysis of adhesions; has been following with outpatient pancreatic and GI team here at [facility] and [place], currently working actively with Dr. Park.  Came to ED for initiation of TPN and monitoring for refeeding syndrome    Patient is hemodynamically stable; asymptomatic for systemic concerns like infection/sepsis; labs reassuring including normal electrolytes, WBC 6, lactic acid 0.6, ketone borderline elevated likely due to starvation, lipase WNL; LFTs slightly  elevated (monitor); CT abdomen without acute finding    Continue home symptomatic management for nausea and vomiting including Compazine, promethazine, Benadryl, scopolamine patch    Do not give Zofran per patient due to allergy    Continue home pain management including Tylenol, Flexeril, ibuprofen, methocarbamol.     Ordered IV Dilaudid for moderate to severe breakthrough pain; patient reports significant improvement with Dilaudid dose given in ED    Pantoprazole 40 mg daily    Patient reports currently does not want to take motility related medications and Creon     Consider pancreas/GI consult if needed    Start TPN per plan, monitor refeeding syndrome.  Current electrolytes stable; was given 1 L bolus LR in ED, continue MIVF for now    # Candida esophagitis    Pathology results from recent EGD came back positive for candidiasis.  Patient was prescribed fluconazole for 7 days but has not started yet.    Continue fluconazole 400 mg suspension daily per G-tube    # History of pancreatectomy related DM    Had TPIT in 2016; was started on insulin due to DM but stopped due to frequent hypoglycemia and chronic malnutrition.  Currently not on insulin.  Glucose on admission 122; A1c from 11/2022 5.5.     Continue to monitor    Hypoglycemia precautions    # Hypothyroidism    Continue PTA levothyroxine    # Migraine    Uses Botox injection and Zomig at home.  Ordered as needed Zomig. Patient would like to have her home supply of Zomig at bedside due to past delayed supply from pharmacy          Diet:  Advance liquid diet per G-tube as tolerated per primary and nutrition  DVT Prophylaxis: Pneumatic Compression Devices  Piedra Catheter: Not present  Lines: PRESENT             Cardiac Monitoring: None  Code Status:  No CPR, OK for intubation    Clinically Significant Risk Factors Present on Admission                               Disposition Plan      Expected Discharge Date: 05/13/2023                The patient's care was  discussed with the Attending Physician, Dr. Almanza and the patient.    Jeannette Espana PA-C  Hospitalist Service, [facility]  Securely message with [employer] (more info)  Text page via [facility] Paging/Directory   See signed in provider for up to date coverage information    ______________________________________________________________________    Chief Complaint   Recurrent nausea/vomiting/diarrhea/abdominal pain     History is obtained from the patient and chart review    History of Present Illness   Dinora Mcghee is a 58 year old female with  PMH of gallstone s/p cholecystectomy (1996), s/p multiple ERCP c/b recurrent pancreatitis, chronic abdominal pain, gastroparesis, chronic nausea and vomiting, chronic malnutrition on G-tube s/p multiple G/J-tube adjustments s/p TPIT (2016), Hx of GERD, hypothyroidism, uterine tumor s/p hysterectomy referred from the outpatient team for initiation of TPN.  Patient is suffering from worsening nausea/vomiting/diarrhea/abdominal pain resulting in significant weight loss and malnutrition.  She is unable to increase her G-tube intake due to the symptoms and outpatient team decided to try TPN.  Has no prior history of use of TPN.   Patient problem started in 1996 when she had gallstone and had to undergo ERCPs and cholecystectomy.  Per patient, there was inadvertent injury to the pancreas that resulted in recurrent pancreatitis and the problems mentioned above; has had a years long outpatient follow-up with teams both at [place] and here; reports this condition resulting in significant health, personal and social economic impact    Past Medical History    Past Medical History:   Diagnosis Date     Allergic rhinitis, cause unspecified      Allergy to other foods     strawberries, apples, celeries, alice, watermelon     Arthritis     left knee     Choledocholithiasis     long after cholecystectomy     Chronic abdominal pain       Chronic constipation      Chronic nausea      Chronic pancreatitis (H)      Degeneration of lumbar or lumbosacral intervertebral disc      Esophageal reflux     w/ hiatal hernia     Gastroparesis      Hiatal hernia      History of pituitary adenoma     s/p resection     Hypothyroidism      Migraines      Mild hyperlipidemia      On tube feeding diet     presence of GJ tube     Pancreatic disease     PD stricture, 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 endometriosis growth     APPENDECTOMY        SECTION       COLONOSCOPY       ENDOSCOPIC INSERTION TUBE GASTROSTOMY N/A 2021    Procedure: Gastrojejonostomy placement with jejunopexy, PEG tube exchange;  Surgeon: Zackery Montoya MD;  [facility]     ENDOSCOPIC RETROGRADE CHOLANGIOPANCREATOGRAM N/A 2015    Procedure: ENDOSCOPIC RETROGRADE CHOLANGIOPANCREATOGRAM;  Surgeon: Mandeep Park MD;  [facility]     ENDOSCOPIC RETROGRADE CHOLANGIOPANCREATOGRAM N/A 2016    Procedure: COMBINED ENDOSCOPIC RETROGRADE CHOLANGIOPANCREATOGRAPHY, PLACE TUBE/STENT;  Surgeon: Mandeep Park MD;  [facility]     ENDOSCOPIC RETROGRADE CHOLANGIOPANCREATOGRAM N/A 3/17/2016    Procedure: COMBINED ENDOSCOPIC RETROGRADE CHOLANGIOPANCREATOGRAPHY, REMOVE FOREIGN BODY OR STENT/TUBE;  Surgeon: Mandeep Park MD;  [facility]     ENDOSCOPIC RETROGRADE CHOLANGIOPANCREATOGRAM N/A 2016    Procedure: COMBINED ENDOSCOPIC RETROGRADE CHOLANGIOPANCREATOGRAPHY, PLACE TUBE/STENT;  Surgeon: Mandeep Park MD;  [facility]     ENDOSCOPIC RETROGRADE CHOLANGIOPANCREATOGRAM N/A 2016     Procedure: COMBINED ENDOSCOPIC RETROGRADE CHOLANGIOPANCREATOGRAPHY, REMOVE FOREIGN BODY OR STENT/TUBE;  Surgeon: Mandeep Park MD;  [facility]     ENDOSCOPIC ULTRASOUND UPPER GASTROINTESTINAL TRACT (GI) N/A 10/3/2016    Procedure: ENDOSCOPIC ULTRASOUND, ESOPHAGOSCOPY / UPPER GASTROINTESTINAL TRACT (GI);  Surgeon: Guru Jose Rodas MD;  [facility]     ENTEROSCOPY SMALL BOWEL N/A 2/3/2022    Procedure: ENTEROSCOPY with possible fistula closure;  Surgeon: Francisco Dodson MD;  Location:  GI     ESOPHAGOSCOPY, GASTROSCOPY, DUODENOSCOPY (EGD), COMBINED N/A 6/24/2015    Procedure: COMBINED ESOPHAGOSCOPY, GASTROSCOPY, DUODENOSCOPY (EGD), REMOVE FOREIGN BODY;  Surgeon: Mandeep Park MD;  [facility]     ESOPHAGOSCOPY, GASTROSCOPY, DUODENOSCOPY (EGD), COMBINED N/A 10/25/2015    Procedure: COMBINED ESOPHAGOSCOPY, GASTROSCOPY, DUODENOSCOPY (EGD);  Surgeon: Sammy Amaro MD;  [facility]     ESOPHAGOSCOPY, GASTROSCOPY, DUODENOSCOPY (EGD), COMBINED N/A 10/25/2015    Procedure: COMBINED ESOPHAGOSCOPY, GASTROSCOPY, DUODENOSCOPY (EGD), BIOPSY SINGLE OR MULTIPLE;  Surgeon: Sammy Amaro MD;  [facility]     ESOPHAGOSCOPY, GASTROSCOPY, DUODENOSCOPY (EGD), COMBINED N/A 1/31/2023    Procedure: ESOPHAGOGASTRODUODENOSCOPY (EGD) with peg replacement ;  Surgeon: Mandeep Park MD;  [facility]     ESOPHAGOSCOPY, GASTROSCOPY, DUODENOSCOPY (EGD), DILATATION, COMBINED       EXCISE LESION TRUNK N/A 4/17/2017    Procedure: EXCISE LESION TRUNK;  Removal of Abdominal Foreign Body;  Surgeon: Nestor Phoenix MD;  [facility]     HC ESOPH/GAS REFLUX TEST W NASAL IMPED >1 HR N/A 11/19/2015    Procedure: ESOPHAGEAL IMPEDENCE FUNCTION TEST WITH 24 HOUR PH GREATER THAN 1 HOUR;  Surgeon: Thiago Apple MD;  [facility]     HC UGI ENDOSCOPY DIAG W BIOPSY  9/17/08     HC UGI ENDOSCOPY DIAG W BIOPSY  9/27/12     HC UGI ENDOSCOPY W ESOPHAGEAL DILATION  BALLOON <30MM  9/17/08      UG[facility] ENDOSCOPY W EUS N/A 5/5/2015    Procedure: COMBINED ENDOSCOPIC ULTRASOUND, ESOPHAGOSCOPY, GASTROSCOPY, DUODENOSCOPY (EGD);  Surgeon: Wm Dueñas MD;  [facility]     HC WRIST ARTHROSCOP,RELEASE XVERS LIG Bilateral 12/17/08     INJECT TRANSVERSUS ABDOMINIS PLANE (TAP) BLOCK BILATERAL Left 9/22/2016    Procedure: INJECT TRANSVERSUS ABDOMINIS PLANE (TAP) BLOCK BILATERAL;  Surgeon: Dickson Corrigan MD;  [facility]     INJECT TRIGGER POINT Bilateral 9/8/2022    Procedure: Abdominal trigger point injection with ultrasound;  Surgeon: Monika Mahajan MD;  [facility]     INJECT TRIGGER POINT SINGLE / MULTIPLE 3 OR MORE MUSCLES Right 11/15/2022    Procedure: Trigger point injections right abdomen with ultrasound guidance;  Surgeon: Monika Mahajan MD;  Location: [facility]     IR CHEST PORT PLACEMENT < 5 YRS OF AGE  6/10/2022     laparoscopic pineda  1995     LAPAROSCOPIC HERNIORRHAPHY INCISIONAL N/A 8/23/2018    Procedure: LAPAROSCOPIC HERNIORRHAPHY INCISIONAL;  Laparoscopic Incisional Hernia Repair with Symbotex Mesh Implant;  Surgeon: Nestor Phoenix MD;  [facility]     LAPAROSCOPIC PANCREATECTOMY, TRANSPLANT AUTO ISLET CELL N/A 12/28/2016    Procedure: LAPAROSCOPIC PANCREATECTOMY, TRANSPLANT AUTO ISLET CELL;  Surgeon: Nestor Phoenix MD;  [facility]     LAPAROTOMY EXPLORATORY N/A 1/31/2023    Procedure: Exploratory Laparotomy, lysis of adhesions, Perforated J-Junostomy Resection, Replace J-Junostomy site;  Surgeon: Elver Bauer MD;  [facility]     LAPAROTOMY, LYSIS ADHESIONS, COMBINED N/A 1/31/2023    Procedure: Dilatation of jejunostomy feeding tube, track with placement of jejunostomy tube with fluoroscopy;  Surgeon: Elver Bauer MD;  [facility]     REMOVE AND REPLACE BREAST IMPLANT PROSTHESIS N/A 12/30/2022    Procedure: Exploratory Laparotomy, lysis of adhesions, small bowel resection. Placement of gastric jejunostomy for  enteral feeding.;  Surgeon: Elver Bauer MD;  [facility]     REMOVE GASTROSTOMY TUBE ADULT N/A 2022    Procedure: REMOVAL, GASTROSTOMY TUBE, ADULT;  Surgeon: Mandeep Park MD;  [facility]     REPLACE GASTROJEJUNOSTOMY TUBE, PERCUTANEOUS N/A 2021    Procedure: GASTROJEJUNOSTOMY TUBE PLACEMENT, PERCUTANEOUS, WITH GASTROPEXY;  Surgeon: Mandeep Park MD;  [facility]     REPLACE GASTROJEJUNOSTOMY TUBE, PERCUTANEOUS N/A 2021    Procedure: REPLACEMENT, GASTROJEJUNOSTOMY TUBE, PERCUTANEOUS;  Surgeon: Zackery Montoya MD;  [facility]     REPLACE GASTROJEJUNOSTOMY TUBE, PERCUTANEOUS N/A 2022    Procedure: REPLACEMENT, GASTROJEJUNOSTOMY TUBE, PERCUTANEOUS;  Surgeon: Mandeep Park MD;  [facility]     REPLACE GASTROJEJUNOSTOMY TUBE, PERCUTANEOUS N/A 2022    Procedure: REPLACEMENT, GASTROJEJUNOSTOMY TUBE, PERCUTANEOUS with ENDOSCOPIC SUTURING.;  Surgeon: Mandeep Park MD;  [facility]     REPLACE JEJUNOSTOMY TUBE, PERCUTANEOUS N/A 9/10/2021    Procedure: UPPER ENDOSCOPY, REPLACEMENT OF PERCUTANEOUS GASTROJEJUNOSTOMY TUBE, T-TAG GASTROPEXY;  Surgeon: Zackery Montoya MD;  [facility]     REPLACE JEJUNOSTOMY TUBE, PERCUTANEOUS N/A 2021    Procedure: REPLACEMENT, JEJUNOSTOMY TUBE, PERCUTANEOUS;  Surgeon: Mandeep Park MD;  [facility]     REPLACE JEJUNOSTOMY TUBE, PERCUTANEOUS N/A 2023    Procedure: REPLACEMENT, JEJUNOSTOMY TUBE, PERCUTANEOUS;  Surgeon: Mandeep Park MD;  [facility]     transphenoidal pituitary resection       Z  DELIVERY ONLY       Z  DELIVERY ONLY      repeat c section with incidental cystotomy with repair     ZZ EXCIS PITUITARY,TRANSNASAL/SEPTAL      pituitary tumor removed for diabetes insipidus     Z TOTAL ABDOM HYSTERECTOMY      w/ bilateral salpingoophorectomy        Prior to Admission Medications   Prior to  Admission Medications   Prescriptions Last Dose Informant Patient Reported? Taking?   Acetaminophen Childrens 160 MG/5ML SUSP   No No   Sig: TAKE 20.312ML (650MG) BY MOUTH EVERY 6 HOURS AS NEEDED FOR FEVER OR MILD PAIN   Continuous Blood Gluc Sensor (FREESTYLE LISA 2 SENSOR) [facility] [place]   No No   Sig: USE ONE SENSOR ONCE EVERY 14 DAYS   LACTATED RINGERS IV   Yes No   Sig: Inject 1 L into the vein daily   Lansoprazole (PREVACID IV)   Yes No   Sig: Inject into the vein daily   Lidocaine (LIDOCARE) 4 % Patch   No No   Sig: Place 1 patch onto the skin every 24 hours Apply patch to abdomen once daily as needed. Remove after 12 hours. To prevent lidocaine toxicity, should be patch free for 12 hrs daily.   Nutritional Supplements (BOOST HIGH PROTEIN) LIQD   No No   Sig: After above baseline labs are drawn, give: 6 mL/kg to maximum of 360 mL; the beverage is to be consumed within 5 minutes.   STATIN NOT PRESCRIBED (INTENTIONAL)   Yes No   Sig: Previous liver issues, risks vs benefits felt to not warrant statin.    Discussed Oct 2022 visit   Sharps Container (BD SHARPS ) MISC   No No   Si Container as needed   ZOLMitriptan (ZOMIG-ZMT) 5 MG ODT   No No   Sig: Take 1 tablet (5 mg) by mouth at onset of headache for migraine May repeat in 2 hours. Max 2 tablets/24 hours.   acarbose (PRECOSE) 100 MG tablet   No No   Sig: Take 1 tablet (100 mg) by mouth 3 times daily (with meals)   acetaminophen (TYLENOL) 325 MG/10.15ML solution   No No   Si.3 mLs (650 mg) by Per J Tube route every 6 hours as needed for mild pain or fever   amylase-lipase-protease (CREON) 48373-51934 units CPEP per EC capsule   No No   Sig: Take 3-4 capsules by mouth 3 times daily (with meals) With oral meals   blood glucose (PAT CONTOUR NEXT) test strip   No No   Sig: Use to test blood sugar 6 times daily or as directed.   blood glucose monitoring (PAT MICROLET) lancets   No No   Sig: Use to test blood sugar 6-8 times daily or as directed.  "  cyclobenzaprine (FLEXERIL) 10 MG tablet   No No   Si tablet (10 mg) by Per G Tube route 2 times daily as needed for muscle spasms   diphenhydrAMINE (BENADRYL ALLERGY) 25 MG capsule   No No   Sig: Take 1 capsule (25 mg) by mouth every 6 hours as needed for itching or allergies   estradiol (VAGIFEM) 10 MCG TABS vaginal tablet   No No   Sig: INSERT 1 TABLET(10 MCG) VAGINALLY 2 TIMES A WEEK   famotidine (PEPCID) 40 MG/5ML suspension   No No   Si.5 mLs (20 mg) by Per J Tube route daily   fluconazole (DIFLUCAN) 40 MG/ML suspension   No No   Sig: Take 10 mLs (400 mg) by mouth daily for 7 days   glucagon 1 MG kit   No No   Sig: Give 0.1 to 0.15mg( 10-15 units on Insulin sryinge) subcutaneous  every 15 minutes PRN for hypoglycemia. Remix new kit q24hr. Needs up to 3 kit/week.   glucose 40 % GEL gel   No No   Sig: Take 15-30 g by mouth every 15 minutes as needed for low blood sugar   ibuprofen (ADVIL/MOTRIN) 400 MG tablet   No No   Sig: Take 1 tablet (400 mg) by mouth every 6 hours as needed for moderate pain   insulin syringe-needle U-100 (30G X 1/2\" 0.3 ML) 30G X 1/2\" 0.3 ML miscellaneous   No No   Sig: Use 3 syringes daily or as directed.   levothyroxine (SYNTHROID/LEVOTHROID) 137 MCG tablet   Yes No   Si mcg by Per G Tube route daily   methocarbamol (ROBAXIN) 750 MG tablet   No No   Si tablet (750 mg) by Per Feeding Tube route 4 times daily   montelukast (SINGULAIR) 10 MG tablet   Yes No   Sig: 10 mg by Per G Tube route At Bedtime   prochlorperazine (COMPAZINE) 10 MG tablet   No No   Sig: TAKE 1/2 TABLET(5 MG) BY MOUTH EVERY 6 HOURS AS NEEDED FOR NAUSEA OR VOMITING   Patient taking differently: 20 mg   promethazine-phenylephrine (PROMETHAZINE VC) 6.25-5 MG/5ML syrup   No No   Si mLs (25 mg) by Per Feeding Tube route nightly as needed for nausea - Per Feeding Tube   scopolamine (TRANSDERM) 1 MG/3DAYS 72 hr patch   No No   Sig: Place 1 patch onto the skin every 72 hours   scopolamine (TRANSDERM) 1 " MG/3DAYS 72 hr patch   No No   Sig: Place 1 patch onto the skin every 72 hours   sennosides (SENOKOT) 8.8 MG/5ML syrup   No No   Si mLs by Per J Tube route 2 times daily   zinc oxide - white petrolatum (CRITIC-AID THICK MOIST BARRIER) 20-51% PSTE topical paste   No No   Sig: Apply 71 g topically every hour as needed for rash (Under J tube bumper when needed for skin protection)      Facility-Administered Medications Last Administration Doses Remaining   Botulinum Toxin Type A (BOTOX) 200 units injection 155 Units 3/23/2023  1:00 PM    Botulinum Toxin Type A (BOTOX) 200 units injection 155 Units None recorded            Review of Systems    The 10 point Review of Systems is negative other than noted in the HPI or here.      Physical Exam   Vital Signs: Temp: 97.8  F (36.6  C) Temp src: Oral BP: (!) 144/91 Pulse: 90   Resp: 18 SpO2: 98 % O2 Device: None (Room air)    Weight: 127 lbs 0 oz    Constitutional: Awake, alert, cooperative, no apparent distress.  HEENT: Moist mucous membranes  Respiratory: Clear to auscultation bilaterally, no crackles or wheezing.  Cardiovascular: Regular rate and rhythm, normal S1 and S2, and no murmur noted.  GI: Soft, non-distended, normal bowel sounds.  Has some moderate generalized abdominal tenderness without rigidity, guarding, or rebound tenderness; G-tube site without visible sign of infection  Skin: No rashes, no cyanosis, no edema.  Musculoskeletal: No joint swelling, erythema or tenderness.  Neurologic: Normal strength and sensation.  Psychiatric: Alert, oriented to person, place and time, no obvious anxiety or depression      Medical Decision Making       90 MINUTES SPENT BY ME on the date of service doing chart review, history, exam, documentation & further activities per the note.      Data     I have personally reviewed the following data over the past 24 hrs:    6.0  \   12.1   / 425     141 104 6.3 /  122 (H)   3.9 27 0.49 (L) \       ALT: 67 (H) AST: 27 AP: 123 (H)  TBILI: 0.5   ALB: 4.2 TOT PROTEIN: 6.9 LIPASE: 5 (L)       Procal: N/A CRP: N/A Lactic Acid: 0.6 (L)         Imaging results reviewed over the past 24 hrs:   Recent Results (from the past 24 hour(s))   CT Abdomen Pelvis w Contrast    Narrative    EXAM: CT ABDOMEN PELVIS W CONTRAST  LOCATION: [facility]  DATE/TIME: 5/9/2023 2:55 AM CDT    INDICATION: abdominal pain, r o abscess, bowel obstruction  COMPARISON: 01/12/2023  TECHNIQUE: CT scan of the abdomen and pelvis was performed following injection of IV contrast. Multiplanar reformats were obtained. Dose reduction techniques were used.  CONTRAST: 78 mL [id number]    FINDINGS:   LOWER CHEST: Mild basilar atelectasis.    HEPATOBILIARY: Cholecystectomy.    PANCREAS: Pancreatectomy.    SPLEEN: Splenectomy.    ADRENAL GLANDS: Normal.    KIDNEYS/BLADDER: Normal.    BOWEL: Percutaneous jejunostomy. Bowel is normal caliber. Moderate amount of colonic stool. Peripheral gas in the right colon. Difficult to tell if this is between bowel content and the wall versus pneumatosis.    LYMPH NODES: Normal.    VASCULATURE: Unremarkable.    PELVIC ORGANS: Absent uterus.    MUSCULOSKELETAL: Stable lesion L2 vertebral body.      Impression    IMPRESSION:   1.  No bowel obstruction or abscess.  2.  Peripheral gas in the right colon which could be between bowel content and the wall of the right colon. Difficult to exclude possibility of pneumatosis. No visible bowel wall thickening or portal venous gas. Correlation with lactate and follow-up   suggested.

## 2023-05-09 NOTE — PLAN OF CARE
Goal Outcome Evaluation:      Plan of Care Reviewed With: patient    Overall Patient Progress: improvingOverall Patient Progress: improving    Outcome Evaluation: Beginning TPN and TF. PO for pleasure/as desired. See RD note 5/9 for details.    Holly Moura RDN, LD  6D/ED RD pager: [phone]  Weekend/Holiday RD pager: [phone]

## 2023-05-09 NOTE — PROGRESS NOTES
[facility]    Medicine Progress Note - Hospitalist Service, GOLD TEAM 11    Date of Admission:  5/8/2023    Assessment & Plan   Dinora Mcghee is a 58 year old female admitted on 5/8/2023. She has PMH of gallstone s/p cholecystectomy (1996), s/p multiple ERCP c/b recurrent pancreatitis, chronic abdominal pain, gastroparesis, chronic nausea and vomiting, chronic malnutrition/FTT on G-tube s/p multiple G/J-tube adjustments/laparotomy s/p TPIT (2016), Hx of GERD, hypothyroidism, uterine tumor s/p hysterectomy referred from the outpatient team for initiation of TPN.     Today's plan   -Ordered TPN pharmacy consult   -Awaiting on GI recommendations      Chronic intermittent pancreatitis  Exacerbation of Chronic nausea and vomiting   chronic intermittent diarrhea  Worsening Chronic intermittent abdominal pain  GERD  Gastroparesis    Currently has ongoing nausea and vomiting with significant abdominal pain similar to her chronic symptoms but relatively severe, pain at 8/10; PTA on G-tube feeding but unable to tolerate increasing volume to the required 70 cc/h due to intractable nausea/vomiting and diarrhea; currently at 30 cc/h feeding but has lost significant weight from 144 pounds in January to current 127.  Also gets 1 L Ringer's lactate daily at home, has tried different nutrition supplements. has had multiple adjustments to G/J-tube due to malfunctioning, pain , and other complications; also had multiple related laparotomy including repair of small bowel perforation/resectioin and lysis of adhesions; has been following with outpatient pancreatic and GI team here at [facility] and [place], currently working actively with Dr. Park.  Came to ED for initiation of TPN and monitoring for refeeding syndrome    Patient is hemodynamically stable; asymptomatic for systemic concerns like infection/sepsis; labs reassuring including normal electrolytes, WBC 6, lactic acid 0.6, ketone  borderline elevated likely due to starvation, lipase WNL; LFTs slightly elevated (monitor); CT abdomen without acute finding    Continue home symptomatic management for nausea and vomiting including Compazine, promethazine, Benadryl, scopolamine patch    Do not give Zofran per patient due to allergy    Continue home pain management including Tylenol, Flexeril, ibuprofen, methocarbamol.     Ordered IV Dilaudid for moderate to severe breakthrough pain; patient reports significant improvement with Dilaudid dose given in ED    Pantoprazole 40 mg daily    Patient reports currently does not want to take motility related medications and Creon     Consider pancreas/GI consult if needed    Start TPN per plan, monitor refeeding syndrome.  Current electrolytes stable; was given 1 L bolus LR in ED, continue MIVF for now     Candida esophagitis    Pathology results from recent EGD came back positive for candidiasis.  Patient was prescribed fluconazole for 7 days but has not started yet.    Continue fluconazole 400 mg suspension daily per G-tube     History of pancreatectomy related DM    Had TPIT in 2016; was started on insulin due to DM but stopped due to frequent hypoglycemia and chronic malnutrition.  Currently not on insulin.  Glucose on admission 122; A1c from 11/2022 5.5.     Continue to monitor    Hypoglycemia precautions     Hypothyroidism    Continue PTA levothyroxine     Migraine    Uses Botox injection and Zomig at home.  Ordered as needed Zomig. Patient would like to have her home supply of Zomig at bedside due to past delayed supply from pharmacy            Diet: Advance Diet as Tolerated: Clear Liquid Diet    DVT Prophylaxis: Pneumatic Compression Devices  Piedra Catheter: Not present  Lines: PRESENT             Cardiac Monitoring: None  Code Status: No CPR- Pre-arrest intubation OK      Clinically Significant Risk Factors Present on Admission                               Disposition Plan      Expected Discharge  Date: 05/13/2023                  Santiago Lomax MD  Hospitalist Service, GOLD TEAM 11  [facility]  Securely message with [employer] (more info)  Text page via [employer] Paging/Directory   See signed in provider for up to date coverage information  ______________________________________________________________________    Interval History   Patient has no new symptoms, She was comfortable in bed, Patient denies any abdominal pain, difficulty breathing, fevers or malaise     Physical Exam   Vital Signs: Temp: 98.5  F (36.9  C) Temp src: Oral BP: 114/77 Pulse: 80   Resp: 16 SpO2: 99 % O2 Device: None (Room air)    Weight: 127 lbs 0 oz    Patient was comfortable, Oriented times three   Port noted on right upper extremity   Lungs are clear   s1 and s2 heard   Abdomen was soft     Medical Decision Making       53 MINUTES SPENT BY ME on the date of service doing chart review, history, exam, documentation & further activities per the note.      Data     I have personally reviewed the following data over the past 24 hrs:    6.0  \   12.6   / 420     142 103 6.5 /  103 (H)   3.9 28 0.49 (L) \       ALT: 65 (H) AST: 36 (H) AP: 119 (H) TBILI: 0.5   ALB: 4.1 TOT PROTEIN: 6.8 LIPASE: 5 (L)       Procal: N/A CRP: N/A Lactic Acid: 0.6 (L)         Imaging results reviewed over the past 24 hrs:   Recent Results (from the past 24 hour(s))   CT Abdomen Pelvis w Contrast    Narrative    EXAM: CT ABDOMEN PELVIS W CONTRAST  LOCATION: [facility]  DATE/TIME: 5/9/2023 2:55 AM CDT    INDICATION: abdominal pain, r o abscess, bowel obstruction  COMPARISON: 01/12/2023  TECHNIQUE: CT scan of the abdomen and pelvis was performed following injection of IV contrast. Multiplanar reformats were obtained. Dose reduction techniques were used.  CONTRAST: 78 mL Isovue-370    FINDINGS:   LOWER CHEST: Mild basilar atelectasis.    HEPATOBILIARY: Cholecystectomy.    PANCREAS:  Pancreatectomy.    SPLEEN: Splenectomy.    ADRENAL GLANDS: Normal.    KIDNEYS/BLADDER: Normal.    BOWEL: Percutaneous jejunostomy. Bowel is normal caliber. Moderate amount of colonic stool. Peripheral gas in the right colon. Difficult to tell if this is between bowel content and the wall versus pneumatosis.    LYMPH NODES: Normal.    VASCULATURE: Unremarkable.    PELVIC ORGANS: Absent uterus.    MUSCULOSKELETAL: Stable lesion L2 vertebral body.      Impression    IMPRESSION:   1.  No bowel obstruction or abscess.  2.  Peripheral gas in the right colon which could be between bowel content and the wall of the right colon. Difficult to exclude possibility of pneumatosis. No visible bowel wall thickening or portal venous gas. Correlation with lactate and follow-up   suggested.

## 2023-05-10 LAB
ALBUMIN SERPL BCG-MCNC: 4.3 G/DL (ref 3.5–5.2)
ALBUMIN UR-MCNC: NEGATIVE MG/DL
ALP SERPL-CCNC: 125 U/L (ref 35–104)
ALT SERPL W P-5'-P-CCNC: 67 U/L (ref 10–35)
ANION GAP SERPL CALCULATED.3IONS-SCNC: 12 MMOL/L (ref 7–15)
APPEARANCE UR: CLEAR
AST SERPL W P-5'-P-CCNC: 43 U/L (ref 10–35)
BILIRUB DIRECT SERPL-MCNC: <0.2 MG/DL (ref 0–0.3)
BILIRUB SERPL-MCNC: 0.5 MG/DL
BILIRUB UR QL STRIP: NEGATIVE
BUN SERPL-MCNC: 10.1 MG/DL (ref 6–20)
CALCIUM SERPL-MCNC: 9.7 MG/DL (ref 8.6–10)
CHLORIDE SERPL-SCNC: 100 MMOL/L (ref 98–107)
COLOR UR AUTO: ABNORMAL
CREAT SERPL-MCNC: 0.54 MG/DL (ref 0.51–0.95)
DEPRECATED CALCIDIOL+CALCIFEROL SERPL-MC: 113 UG/L (ref 20–75)
DEPRECATED HCO3 PLAS-SCNC: 27 MMOL/L (ref 22–29)
GFR SERPL CREATININE-BSD FRML MDRD: >90 ML/MIN/1.73M2
GLUCOSE BLDC GLUCOMTR-MCNC: 164 MG/DL (ref 70–99)
GLUCOSE SERPL-MCNC: 121 MG/DL (ref 70–99)
GLUCOSE UR STRIP-MCNC: NEGATIVE MG/DL
HGB UR QL STRIP: NEGATIVE
HOLD SPECIMEN: NORMAL
INR PPP: 1.04 (ref 0.85–1.15)
KETONES UR STRIP-MCNC: NEGATIVE MG/DL
LEUKOCYTE ESTERASE UR QL STRIP: ABNORMAL
MAGNESIUM SERPL-MCNC: 1.9 MG/DL (ref 1.7–2.3)
MAGNESIUM SERPL-MCNC: 1.9 MG/DL (ref 1.7–2.3)
NITRATE UR QL: NEGATIVE
PH UR STRIP: 7 [PH] (ref 5–7)
PHOSPHATE SERPL-MCNC: 3.4 MG/DL (ref 2.5–4.5)
PHOSPHATE SERPL-MCNC: 4.9 MG/DL (ref 2.5–4.5)
POTASSIUM SERPL-SCNC: 4.1 MMOL/L (ref 3.4–5.3)
PREALB SERPL IA-MCNC: 20 MG/DL (ref 15–45)
PROT SERPL-MCNC: 7.4 G/DL (ref 6.4–8.3)
RBC URINE: 1 /HPF
SODIUM SERPL-SCNC: 139 MMOL/L (ref 136–145)
SP GR UR STRIP: 1.01 (ref 1–1.03)
SQUAMOUS EPITHELIAL: 5 /HPF
TRANSITIONAL EPI: 1 /HPF
UROBILINOGEN UR STRIP-MCNC: NORMAL MG/DL
WBC URINE: 28 /HPF

## 2023-05-10 PROCEDURE — C9113 INJ PANTOPRAZOLE SODIUM, VIA: HCPCS

## 2023-05-10 PROCEDURE — 84100 ASSAY OF PHOSPHORUS: CPT | Performed by: STUDENT IN AN ORGANIZED HEALTH CARE EDUCATION/TRAINING PROGRAM

## 2023-05-10 PROCEDURE — 250N000013 HC RX MED GY IP 250 OP 250 PS 637

## 2023-05-10 PROCEDURE — 84134 ASSAY OF PREALBUMIN: CPT | Performed by: STUDENT IN AN ORGANIZED HEALTH CARE EDUCATION/TRAINING PROGRAM

## 2023-05-10 PROCEDURE — 250N000011 HC RX IP 250 OP 636

## 2023-05-10 PROCEDURE — 85610 PROTHROMBIN TIME: CPT | Performed by: STUDENT IN AN ORGANIZED HEALTH CARE EDUCATION/TRAINING PROGRAM

## 2023-05-10 PROCEDURE — 250N000011 HC RX IP 250 OP 636: Performed by: STUDENT IN AN ORGANIZED HEALTH CARE EDUCATION/TRAINING PROGRAM

## 2023-05-10 PROCEDURE — 36415 COLL VENOUS BLD VENIPUNCTURE: CPT | Performed by: STUDENT IN AN ORGANIZED HEALTH CARE EDUCATION/TRAINING PROGRAM

## 2023-05-10 PROCEDURE — 81001 URINALYSIS AUTO W/SCOPE: CPT

## 2023-05-10 PROCEDURE — 87086 URINE CULTURE/COLONY COUNT: CPT

## 2023-05-10 PROCEDURE — 83735 ASSAY OF MAGNESIUM: CPT | Performed by: STUDENT IN AN ORGANIZED HEALTH CARE EDUCATION/TRAINING PROGRAM

## 2023-05-10 PROCEDURE — 80053 COMPREHEN METABOLIC PANEL: CPT | Performed by: STUDENT IN AN ORGANIZED HEALTH CARE EDUCATION/TRAINING PROGRAM

## 2023-05-10 PROCEDURE — 250N000013 HC RX MED GY IP 250 OP 250 PS 637: Performed by: STUDENT IN AN ORGANIZED HEALTH CARE EDUCATION/TRAINING PROGRAM

## 2023-05-10 PROCEDURE — 99233 SBSQ HOSP IP/OBS HIGH 50: CPT | Performed by: STUDENT IN AN ORGANIZED HEALTH CARE EDUCATION/TRAINING PROGRAM

## 2023-05-10 PROCEDURE — 82248 BILIRUBIN DIRECT: CPT | Performed by: STUDENT IN AN ORGANIZED HEALTH CARE EDUCATION/TRAINING PROGRAM

## 2023-05-10 PROCEDURE — 250N000009 HC RX 250: Performed by: STUDENT IN AN ORGANIZED HEALTH CARE EDUCATION/TRAINING PROGRAM

## 2023-05-10 PROCEDURE — 82962 GLUCOSE BLOOD TEST: CPT

## 2023-05-10 PROCEDURE — 120N000002 HC R&B MED SURG/OB UMMC

## 2023-05-10 RX ORDER — SCOLOPAMINE TRANSDERMAL SYSTEM 1 MG/1
1 PATCH, EXTENDED RELEASE TRANSDERMAL
Status: DISCONTINUED | OUTPATIENT
Start: 2023-05-10 | End: 2023-05-12 | Stop reason: HOSPADM

## 2023-05-10 RX ORDER — KETOROLAC TROMETHAMINE 15 MG/ML
15 INJECTION, SOLUTION INTRAMUSCULAR; INTRAVENOUS EVERY 6 HOURS PRN
Status: DISCONTINUED | OUTPATIENT
Start: 2023-05-10 | End: 2023-05-12 | Stop reason: HOSPADM

## 2023-05-10 RX ORDER — AMITRIPTYLINE HYDROCHLORIDE 10 MG/1
10 TABLET ORAL AT BEDTIME
Status: DISCONTINUED | OUTPATIENT
Start: 2023-05-10 | End: 2023-05-12 | Stop reason: HOSPADM

## 2023-05-10 RX ADMIN — SCOPALAMINE 1 PATCH: 1 PATCH, EXTENDED RELEASE TRANSDERMAL at 11:17

## 2023-05-10 RX ADMIN — FLUCONAZOLE 400 MG: 40 POWDER, FOR SUSPENSION ORAL at 08:47

## 2023-05-10 RX ADMIN — CETIRIZINE HYDROCHLORIDE 10 MG: 10 TABLET, FILM COATED ORAL at 08:48

## 2023-05-10 RX ADMIN — METHOCARBAMOL 750 MG: 750 TABLET ORAL at 15:25

## 2023-05-10 RX ADMIN — LEVOTHYROXINE SODIUM 137 MCG: 0.14 TABLET ORAL at 08:49

## 2023-05-10 RX ADMIN — PROCHLORPERAZINE EDISYLATE 10 MG: 5 INJECTION INTRAMUSCULAR; INTRAVENOUS at 15:25

## 2023-05-10 RX ADMIN — METHOCARBAMOL 750 MG: 750 TABLET ORAL at 20:41

## 2023-05-10 RX ADMIN — METHOCARBAMOL 750 MG: 750 TABLET ORAL at 08:50

## 2023-05-10 RX ADMIN — DIPHENHYDRAMINE HYDROCHLORIDE 25 MG: 25 SOLUTION ORAL at 15:25

## 2023-05-10 RX ADMIN — ACETAMINOPHEN 650 MG: 160 SOLUTION ORAL at 07:53

## 2023-05-10 RX ADMIN — MAGNESIUM SULFATE HEPTAHYDRATE: 500 INJECTION, SOLUTION INTRAMUSCULAR; INTRAVENOUS at 20:41

## 2023-05-10 RX ADMIN — MONTELUKAST 10 MG: 10 TABLET, FILM COATED ORAL at 22:36

## 2023-05-10 RX ADMIN — THIAMINE HCL TAB 100 MG 100 MG: 100 TAB at 08:50

## 2023-05-10 RX ADMIN — PROCHLORPERAZINE EDISYLATE 10 MG: 5 INJECTION INTRAMUSCULAR; INTRAVENOUS at 08:50

## 2023-05-10 RX ADMIN — PROCHLORPERAZINE EDISYLATE 10 MG: 5 INJECTION INTRAMUSCULAR; INTRAVENOUS at 02:07

## 2023-05-10 RX ADMIN — PANTOPRAZOLE SODIUM 40 MG: 40 INJECTION, POWDER, FOR SOLUTION INTRAVENOUS at 08:50

## 2023-05-10 RX ADMIN — DIPHENHYDRAMINE HYDROCHLORIDE 25 MG: 25 SOLUTION ORAL at 08:49

## 2023-05-10 RX ADMIN — AMITRIPTYLINE HYDROCHLORIDE 10 MG: 10 TABLET, FILM COATED ORAL at 22:36

## 2023-05-10 RX ADMIN — DIPHENHYDRAMINE HYDROCHLORIDE 25 MG: 25 SOLUTION ORAL at 02:07

## 2023-05-10 RX ADMIN — ACETAMINOPHEN 650 MG: 160 SOLUTION ORAL at 22:40

## 2023-05-10 RX ADMIN — KETOROLAC TROMETHAMINE 15 MG: 15 INJECTION, SOLUTION INTRAMUSCULAR; INTRAVENOUS at 10:52

## 2023-05-10 RX ADMIN — METHOCARBAMOL 750 MG: 750 TABLET ORAL at 11:17

## 2023-05-10 ASSESSMENT — ACTIVITIES OF DAILY LIVING (ADL)
ADLS_ACUITY_SCORE: 41
WEAR_GLASSES_OR_BLIND: YES
ADLS_ACUITY_SCORE: 41
DRESSING/BATHING_DIFFICULTY: NO
ADLS_ACUITY_SCORE: 41
DIFFICULTY_COMMUNICATING: NO
ADLS_ACUITY_SCORE: 41
CHANGE_IN_FUNCTIONAL_STATUS_SINCE_ONSET_OF_CURRENT_ILLNESS/INJURY: NO
ADLS_ACUITY_SCORE: 41
HEARING_DIFFICULTY_OR_DEAF: NO
ADLS_ACUITY_SCORE: 27
ADLS_ACUITY_SCORE: 41
VISION_MANAGEMENT: GLASSSES
DOING_ERRANDS_INDEPENDENTLY_DIFFICULTY: NO
WALKING_OR_CLIMBING_STAIRS_DIFFICULTY: NO
ADLS_ACUITY_SCORE: 41
EQUIPMENT_CURRENTLY_USED_AT_HOME: OTHER (SEE COMMENTS)
DIFFICULTY_EATING/SWALLOWING: NO
ADLS_ACUITY_SCORE: 27
FALL_HISTORY_WITHIN_LAST_SIX_MONTHS: NO
CONCENTRATING,_REMEMBERING_OR_MAKING_DECISIONS_DIFFICULTY: YES
ADLS_ACUITY_SCORE: 41
ADLS_ACUITY_SCORE: 41
TOILETING_ISSUES: NO
ADLS_ACUITY_SCORE: 27

## 2023-05-10 NOTE — PROVIDER NOTIFICATION
Provider text paged Re: Patient has TPN and Tube feedings orders. Does she need to be on both? Please clarify. Thank you.

## 2023-05-10 NOTE — PROGRESS NOTES
GI brief progress note:    Patient tolerates TPN. Can continue tubefeeding as tolerates with TPN.   Not having any diarrhea/bowel movement today.    Discussed with Dr. Genao, her outpatient luminal provider, plan to restart patient which was stopped during prior hospitalization. Patient does think that she had some benefit from amitriptyline and prucalopride in the past. Will do combination of this two with amitriptyline for pain, and prucalopride for slow transit and gastroparesis.    - TPN and monitor for refeeding as per primary team  - Start amitriptyline 10 mg daily today, then increase 10 mg every week; to 50 mg daily.  - When discharged, patient can restart her home prucalopride (not able to prescribed during inpatient. Patient has supplies at home)    Patient is scheduled for follow-up with Dr. Genao 9/2023. She also follows with Dr. Park for TPIAT.    Maren Flores MD  Gastroenterology/Hepatology Fellow

## 2023-05-10 NOTE — PROGRESS NOTES
[facility]    Medicine Progress Note - Hospitalist Service, GOLD TEAM 11    Date of Admission:  5/8/2023    Assessment & Plan   Dinora Mcghee is a 58 year old female admitted on 5/8/2023. She has PMH of gallstone s/p cholecystectomy (1996), s/p multiple ERCP c/b recurrent pancreatitis, chronic abdominal pain, gastroparesis, chronic nausea and vomiting, chronic malnutrition/FTT on G-tube s/p multiple G/J-tube adjustments/laparotomy s/p TPIT (2016), Hx of GERD, hypothyroidism, uterine tumor s/p hysterectomy referred from the outpatient team for initiation of TPN.     Today's plan   -successfully started TPN  -Attempting to start comfort tube feeds as tolerated  -Attempting to get better pain control with IV ketorolac 15 mg q6 hourly as needed   -Started amitriptyline 10 mg daily   -Prucalopride is not formulary and will be started on discharge      Chronic intermittent pancreatitis  Exacerbation of Chronic nausea and vomiting   chronic intermittent diarrhea  Worsening Chronic intermittent abdominal pain  GERD  Gastroparesis  -Attempting to resume tube feeds   -Started TPN from port   -Patient is hemodynamically stable; asymptomatic for systemic concerns like infection/sepsis; labs reassuring including normal electrolytes, WBC 6, lactic acid 0.6, ketone borderline elevated likely due to starvation, lipase WNL; LFTs slightly elevated (monitor); CT abdomen without acute finding  -Continue home symptomatic management for nausea and vomiting including Compazine, promethazine, Benadryl, scopolamine patch  -Do not give Zofran per patient due to allergy  -Continue home pain management including Tylenol, Flexeril, ibuprofen, methocarbamol.   -As needed iv ketorolac 15 mg Q 6 hourly   -Pantoprazole 40 mg daily  -Patient reports currently does not want to take motility related medications and Creon   -Consider pancreas/GI consult if needed  -Start TPN per plan, monitor refeeding  syndrome.  Current electrolytes stable; was given 1 L bolus LR in ED, continue MIVF for now     Candida esophagitis  -Pathology results from recent EGD came back positive for candidiasis.  Patient was prescribed fluconazole for 7 days but has not started yet.  -Continue fluconazole 400 mg suspension daily per G-tube     History of pancreatectomy related DM  -Had TPIT in 2016; was started on insulin due to DM but stopped due to frequent hypoglycemia and chronic malnutrition.  Currently not on insulin.  Glucose on admission 122; A1c from 11/2022 5.5.   -Continue to monitor  -Hypoglycemia precautions     Hypothyroidism  -Continue PTA levothyroxine     Migraine  -Uses Botox injection and Zomig at home.  Ordered as needed Zomig. Patient would like to have her home supply of Zomig at bedside due to past delayed supply from pharmacy              Diet: Regular Diet Adult  parenteral nutrition - ADULT compounded formula  parenteral nutrition - ADULT compounded formula  Adult Formula Drip Feeding: Continuous Vivonex TEN; Jejunostomy; Goal Rate: begin Vivonex at 30 ml/hr, increase to 40 ml/hr as patient agrees/tolerates; mL/hr    DVT Prophylaxis: Pneumatic Compression Devices  Piedra Catheter: Not present  Lines: PRESENT             Cardiac Monitoring: None  Code Status: No CPR- Pre-arrest intubation OK      Clinically Significant Risk Factors                                Disposition Plan     Expected Discharge Date: 05/13/2023                  Santiago Lomax MD  Hospitalist Service, GOLD TEAM [address] [place] [address]  Securely message with [employer] (more info)  Text page via [facility] Paging/Directory   See signed in provider for up to date coverage information  ______________________________________________________________________    Interval History   Patient has some abdominal discomfort, No new fevers, malaise or fatigue.     Physical Exam   Vital Signs:     BP: 109/66 Pulse: 71     SpO2: 98 %       Weight: 127 lbs 0 oz    Patient was somewhat uncomfortable, oriented times three   Lungs are clear   s1 and s2 heard   Abdomen was soft with some mild tenderness to palpation diffusely       Medical Decision Making       35 MINUTES SPENT BY ME on the date of service doing chart review, history, exam, documentation & further activities per the note.      Data     I have personally reviewed the following data over the past 24 hrs:    N/A  \   N/A   / N/A     139 100 10.1 /  164 (H)   4.1 27 0.54 \       ALT: 67 (H) AST: 43 (H) AP: 125 (H) TBILI: 0.5   ALB: 4.3 TOT PROTEIN: 7.4 LIPASE: N/A       INR:  1.04 PTT:  N/A   D-dimer:  N/A Fibrinogen:  N/A       Imaging results reviewed over the past 24 hrs:   No results found for this or any previous visit (from the past 24 hour(s)).

## 2023-05-11 LAB
ANION GAP SERPL CALCULATED.3IONS-SCNC: 10 MMOL/L (ref 7–15)
BUN SERPL-MCNC: 18.6 MG/DL (ref 6–20)
CALCIUM SERPL-MCNC: 9.1 MG/DL (ref 8.6–10)
CHLORIDE SERPL-SCNC: 102 MMOL/L (ref 98–107)
CREAT SERPL-MCNC: 0.59 MG/DL (ref 0.51–0.95)
DEPRECATED HCO3 PLAS-SCNC: 27 MMOL/L (ref 22–29)
GFR SERPL CREATININE-BSD FRML MDRD: >90 ML/MIN/1.73M2
GLUCOSE BLDC GLUCOMTR-MCNC: 145 MG/DL (ref 70–99)
GLUCOSE BLDC GLUCOMTR-MCNC: 151 MG/DL (ref 70–99)
GLUCOSE BLDC GLUCOMTR-MCNC: 153 MG/DL (ref 70–99)
GLUCOSE BLDC GLUCOMTR-MCNC: 82 MG/DL (ref 70–99)
GLUCOSE SERPL-MCNC: 133 MG/DL (ref 70–99)
HOLD SPECIMEN: NORMAL
MAGNESIUM SERPL-MCNC: 2 MG/DL (ref 1.7–2.3)
PHOSPHATE SERPL-MCNC: 5.3 MG/DL (ref 2.5–4.5)
POTASSIUM SERPL-SCNC: 4.4 MMOL/L (ref 3.4–5.3)
SODIUM SERPL-SCNC: 139 MMOL/L (ref 136–145)
ZINC SERPL-MCNC: 78.6 UG/DL

## 2023-05-11 PROCEDURE — 36415 COLL VENOUS BLD VENIPUNCTURE: CPT | Performed by: STUDENT IN AN ORGANIZED HEALTH CARE EDUCATION/TRAINING PROGRAM

## 2023-05-11 PROCEDURE — 84100 ASSAY OF PHOSPHORUS: CPT | Performed by: STUDENT IN AN ORGANIZED HEALTH CARE EDUCATION/TRAINING PROGRAM

## 2023-05-11 PROCEDURE — 120N000002 HC R&B MED SURG/OB UMMC

## 2023-05-11 PROCEDURE — 250N000011 HC RX IP 250 OP 636

## 2023-05-11 PROCEDURE — 83735 ASSAY OF MAGNESIUM: CPT | Performed by: STUDENT IN AN ORGANIZED HEALTH CARE EDUCATION/TRAINING PROGRAM

## 2023-05-11 PROCEDURE — C9113 INJ PANTOPRAZOLE SODIUM, VIA: HCPCS

## 2023-05-11 PROCEDURE — 250N000013 HC RX MED GY IP 250 OP 250 PS 637: Performed by: STUDENT IN AN ORGANIZED HEALTH CARE EDUCATION/TRAINING PROGRAM

## 2023-05-11 PROCEDURE — 80048 BASIC METABOLIC PNL TOTAL CA: CPT | Performed by: STUDENT IN AN ORGANIZED HEALTH CARE EDUCATION/TRAINING PROGRAM

## 2023-05-11 PROCEDURE — 250N000011 HC RX IP 250 OP 636: Performed by: STUDENT IN AN ORGANIZED HEALTH CARE EDUCATION/TRAINING PROGRAM

## 2023-05-11 PROCEDURE — 250N000009 HC RX 250: Performed by: STUDENT IN AN ORGANIZED HEALTH CARE EDUCATION/TRAINING PROGRAM

## 2023-05-11 PROCEDURE — 250N000013 HC RX MED GY IP 250 OP 250 PS 637

## 2023-05-11 PROCEDURE — 99233 SBSQ HOSP IP/OBS HIGH 50: CPT | Performed by: STUDENT IN AN ORGANIZED HEALTH CARE EDUCATION/TRAINING PROGRAM

## 2023-05-11 RX ADMIN — METHOCARBAMOL 750 MG: 750 TABLET ORAL at 09:21

## 2023-05-11 RX ADMIN — KETOROLAC TROMETHAMINE 15 MG: 15 INJECTION, SOLUTION INTRAMUSCULAR; INTRAVENOUS at 21:30

## 2023-05-11 RX ADMIN — METHOCARBAMOL 750 MG: 750 TABLET ORAL at 21:31

## 2023-05-11 RX ADMIN — PROCHLORPERAZINE MALEATE 10 MG: 10 TABLET ORAL at 09:22

## 2023-05-11 RX ADMIN — AMITRIPTYLINE HYDROCHLORIDE 10 MG: 10 TABLET, FILM COATED ORAL at 21:29

## 2023-05-11 RX ADMIN — PROCHLORPERAZINE MALEATE 10 MG: 10 TABLET ORAL at 00:12

## 2023-05-11 RX ADMIN — METHOCARBAMOL 750 MG: 750 TABLET ORAL at 16:42

## 2023-05-11 RX ADMIN — KETOROLAC TROMETHAMINE 15 MG: 15 INJECTION, SOLUTION INTRAMUSCULAR; INTRAVENOUS at 12:59

## 2023-05-11 RX ADMIN — DIPHENHYDRAMINE HYDROCHLORIDE 25 MG: 25 SOLUTION ORAL at 10:35

## 2023-05-11 RX ADMIN — LEVOTHYROXINE SODIUM 137 MCG: 0.14 TABLET ORAL at 09:21

## 2023-05-11 RX ADMIN — DIPHENHYDRAMINE HYDROCHLORIDE 25 MG: 25 SOLUTION ORAL at 00:12

## 2023-05-11 RX ADMIN — CETIRIZINE HYDROCHLORIDE 10 MG: 10 TABLET, FILM COATED ORAL at 09:22

## 2023-05-11 RX ADMIN — METHOCARBAMOL 750 MG: 750 TABLET ORAL at 13:02

## 2023-05-11 RX ADMIN — KETOROLAC TROMETHAMINE 15 MG: 15 INJECTION, SOLUTION INTRAMUSCULAR; INTRAVENOUS at 01:40

## 2023-05-11 RX ADMIN — PROCHLORPERAZINE MALEATE 10 MG: 10 TABLET ORAL at 21:29

## 2023-05-11 RX ADMIN — PANTOPRAZOLE SODIUM 40 MG: 40 INJECTION, POWDER, FOR SOLUTION INTRAVENOUS at 09:22

## 2023-05-11 RX ADMIN — MONTELUKAST 10 MG: 10 TABLET, FILM COATED ORAL at 21:29

## 2023-05-11 RX ADMIN — THIAMINE HCL TAB 100 MG 100 MG: 100 TAB at 09:21

## 2023-05-11 RX ADMIN — FLUCONAZOLE 400 MG: 40 POWDER, FOR SUSPENSION ORAL at 09:22

## 2023-05-11 RX ADMIN — ACETAMINOPHEN 650 MG: 160 SOLUTION ORAL at 21:29

## 2023-05-11 RX ADMIN — MAGNESIUM SULFATE HEPTAHYDRATE: 500 INJECTION, SOLUTION INTRAMUSCULAR; INTRAVENOUS at 21:04

## 2023-05-11 RX ADMIN — DIPHENHYDRAMINE HYDROCHLORIDE 25 MG: 25 SOLUTION ORAL at 21:29

## 2023-05-11 RX ADMIN — SMOFLIPID 250 ML: 6; 6; 5; 3 INJECTION, EMULSION INTRAVENOUS at 21:05

## 2023-05-11 ASSESSMENT — ACTIVITIES OF DAILY LIVING (ADL)
DEPENDENT_IADLS:: TRANSPORTATION;CLEANING
ADLS_ACUITY_SCORE: 27

## 2023-05-11 NOTE — PLAN OF CARE
Goal Outcome Evaluation:      Plan of Care Reviewed With: patient, friend    Overall Patient Progress: improvingOverall Patient Progress: improving    Outcome Evaluation: Pt admitted to 5A. Height, weight and VS obtained. Pt oriented to unit and unit routine. Optimizing nutrition.

## 2023-05-11 NOTE — PROGRESS NOTES
CLINICAL NUTRITION SERVICES     Nutrition Prescription    RECOMMENDATIONS FOR MDs/PROVIDERS TO ORDER:  See prior nutrition note for all details.     Malnutrition Status:    See prior nutrition note for all details.     Recommendations already ordered by Registered Dietitian (RD):  Advanced central parenteral nutrition (CPN) dextrose to goal.     Future/Additional Recommendations:  See prior nutrition note for all details.      Diet: Regular (per prior RD, PO for pleasure)  TF (via J-tube): Vivonex TEN at 30 mL/hr (goal rate is 30-40 mL/hr)  Central Parenteral Nutrition: 1440 mL/day, 115 g dextrose/day, 55 g AA/day, and 250 mL of 20% IV lipids twice weekly. Lytes are WNL 5/11.     INTERVENTIONS:  Implementation:  Parenteral Nutrition/IV Fluids: Entered change order to advance parenteral nutrition dextrose to goal of 150 g/day. See prior nutrition note for all details.   Collaboration with other providers: Communicated plan for dextrose advancement (eventual goal to cycle CPN as able) to PharmD. Communicated nutrition plan with RN.   Ordered to check triglycerides.     Follow up/Monitoring:  Will continue to follow pt.    Diana Gil, MS, RD, LD, CNSC   6C/5A(beds 5317 - 1069) RD pgr: [phone]

## 2023-05-11 NOTE — PROGRESS NOTES
[facility]    Medicine Progress Note - Hospitalist Service, GOLD TEAM 11    Date of Admission:  5/8/2023    Assessment & Plan   Dinora Mcghee is a 58 year old female admitted on 5/8/2023. She has PMH of gallstone s/p cholecystectomy (1996), s/p multiple ERCP c/b recurrent pancreatitis, chronic abdominal pain, gastroparesis, chronic nausea and vomiting, chronic malnutrition/FTT on G-tube s/p multiple G/J-tube adjustments/laparotomy s/p TPIT (2016), Hx of GERD, hypothyroidism, uterine tumor s/p hysterectomy referred from the outpatient team for initiation of TPN.     Today's plan   -Patient was comfortable today  -Mentions she is tolerating tube feeds today at 30 ml/hour   -Working with care coordination to get home infusion of TPN arranged      Chronic intermittent pancreatitis  Exacerbation of Chronic nausea and vomiting   chronic intermittent diarrhea  Worsening Chronic intermittent abdominal pain  GERD  Gastroparesis  - tube feeds resumed  -Started TPN from port   -Patient is hemodynamically stable; asymptomatic for systemic concerns like infection/sepsis; labs reassuring including normal electrolytes, WBC 6, lactic acid 0.6, ketone borderline elevated likely due to starvation, lipase WNL; LFTs slightly elevated (monitor); CT abdomen without acute finding  -Continue home symptomatic management for nausea and vomiting including Compazine, promethazine, Benadryl, scopolamine patch  -Do not give Zofran per patient due to allergy  -Continue home pain management including Tylenol, Flexeril, ibuprofen, methocarbamol.   -As needed iv ketorolac 15 mg Q 6 hourly   -Pantoprazole 40 mg daily  -Patient reports currently does not want to take motility related medications and Creon   -Consider pancreas/GI consult if needed  -Start TPN per plan, monitor refeeding syndrome.  Current electrolytes stable; was given 1 L bolus LR in ED, continue MIVF for now     Candida  esophagitis  -Pathology results from recent EGD came back positive for candidiasis.  Patient was prescribed fluconazole for 7 days but has not started yet.  -Continue fluconazole 400 mg suspension daily per G-tube     History of pancreatectomy related DM  -Had TPIT in 2016; was started on insulin due to DM but stopped due to frequent hypoglycemia and chronic malnutrition.  Currently not on insulin.  Glucose on admission 122; A1c from 11/2022 5.5.   -Continue to monitor  -Hypoglycemia precautions     Hypothyroidism  -Continue PTA levothyroxine     Migraine  -Uses Botox injection and Zomig at home.  Ordered as needed Zomig. Patient would like to have her home supply of Zomig at bedside due to past delayed supply from pharmacy                Diet: Regular Diet Adult  parenteral nutrition - ADULT compounded formula  Adult Formula Drip Feeding: Continuous Vivonex TEN; Jejunostomy; Goal Rate: begin Vivonex at 30 ml/hr, increase to 40 ml/hr as patient agrees/tolerates; mL/hr    DVT Prophylaxis: Pneumatic Compression Devices  Piedra Catheter: Not present  Lines: PRESENT      Port A Cath Single 12/09/22 Right Chest wall-Site Assessment: WDL      Cardiac Monitoring: None  Code Status: No CPR- Pre-arrest intubation OK      Clinically Significant Risk Factors                                  Disposition Plan      Expected Discharge Date: 05/13/2023        Discharge Comments: Pain control and ability to tolerate some tube feeds.          Santiago Lomax MD  Hospitalist Service, GOLD TEAM [address] [place] [address]  Securely message with [employer] (more info)  Text page via [facility] Paging/Directory   See signed in provider for up to date coverage information  ______________________________________________________________________    Interval History   Patient has no new symptoms, She was comfortable when examined, Does not report any abdominal pain, difficulty breathing, fevers, nausea or vomiting      Physical Exam   Vital Signs: Temp: 97.4  F (36.3  C) Temp src: Oral BP: 111/56 Pulse: 75   Resp: 16 SpO2: 97 % O2 Device: None (Room air)    Weight: 127 lbs 0 oz    Patient was comfortable, Oriented times three   Lungs are clear   s1 and s2 heard   Abdomen was soft with peg tube in place     Medical Decision Making       52 MINUTES SPENT BY ME on the date of service doing chart review, history, exam, documentation & further activities per the note.      Data     I have personally reviewed the following data over the past 24 hrs:    N/A  \   N/A   / N/A     139 102 18.6 /  133 (H)   4.4 27 0.59 \       Imaging results reviewed over the past 24 hrs:   No results found for this or any previous visit (from the past 24 hour(s)).

## 2023-05-11 NOTE — PLAN OF CARE
Goal Outcome Evaluation:      Plan of Care Reviewed With: patient    Overall Patient Progress: improving    Outcome Evaluation: A&O x 4.  VSS on room air.  c/o abdominal pain at a 3/10 but declines any pain meds.  Somewhat tolerates diet- some nausea but no emesis.  Pt mostly having clear liquids at this time.  R chest port infusing continuous TPN at 60mL/hr.  All meds through G tube.  TF running through J tube at 30mL/hr.  Scopolamine patch behind L ear.  Plan is for discharge home tomorrow.

## 2023-05-11 NOTE — CONSULTS
Care Management Initial Consult    General Information  Assessment completed with: Patient,    Type of CM/SW Visit: Initial Assessment    Primary Care Provider verified and updated as needed: Yes   Readmission within the last 30 days: no previous admission in last 30 days         Advance Care Planning: Advance Care Planning Reviewed: no concerns identified          Communication Assessment  Patient's communication style: spoken language (English or Bilingual)    Hearing Difficulty or Deaf: no   Wear Glasses or Blind: yes    Cognitive  Cognitive/Neuro/Behavioral: WDL                      Living Environment:   People in home: spouse     Current living Arrangements: house      Able to return to prior arrangements: yes       Family/Social Support:  Care provided by: self, spouse/significant other  Provides care for: no one  Marital Status:   , Children          Description of Support System: Supportive, Involved    Support Assessment: Adequate family and caregiver support    Current Resources:   Patient receiving home care services: Yes  Skilled Home Care Services: Skilled Nursing  Community Resources: Home Infusion, Home Care  Equipment currently used at home: other (see comments) (IV pump, poles, feeding tube pump)  Supplies currently used at home: Enteral Nutrition & Supplies    Employment/Financial:  Employment Status:  (Not discussed at this time. Was previously a SW for 5 years)        Financial Concerns: No concerns identified           Does the patient's insurance plan have a 3 day qualifying hospital stay waiver?  No    Lifestyle & Psychosocial Needs:  Social Determinants of Health     Tobacco Use: Medium Risk (5/5/2023)    Patient History     Smoking Tobacco Use: Former     Smokeless Tobacco Use: Unknown     Passive Exposure: Not on file   Alcohol Use: Not on file   Financial Resource Strain: Not on file   Food Insecurity: Not on file   Transportation Needs: Not on file   Physical Activity: Not  "on file   Stress: Not on file   Social Connections: Not on file   Intimate Partner Violence: Not on file   Depression: At risk (4/25/2023)    PHQ-2     PHQ-2 Score: 4   Housing Stability: Not on file       Functional Status:  Prior to admission patient needed assistance:   Dependent ADLs:: Independent  Dependent IADLs:: Transportation, Cleaning       Mental Health Status:  Mental Health Status: No Current Concerns       Chemical Dependency Status:  Chemical Dependency Status: No Current Concerns             Values/Beliefs:  Spiritual, Cultural Beliefs, Restorationist Practices, Values that affect care:  (Not discussed at this time)               Additional Information:  Dinora Mcghee is a 58 year old female admitted on 5/8/2023. She has PMH of gallstone s/p cholecystectomy (1996), s/p multiple ERCP c/b recurrent pancreatitis, chronic abdominal pain, gastroparesis, chronic nausea and vomiting, chronic malnutrition/FTT on G-tube s/p multiple G/J-tube adjustments/laparotomy s/p TPIT (2016), Hx of GERD, hypothyroidism, uterine tumor s/p hysterectomy referred from the outpatient team for initiation of TPN.     SW met with the patient at bedside to complete CMA. Patient reported she lives at home with her . The patient reported he  is very supportive and provides additional assistance as needed. The patient reported she is receives services through [facility] and home care through [facility] that assists her with port cares. Patient reported her home care agency is aware that she is hospitalized and will be discharging with tube feeds and TPN. Her home care agency can provide additional training.     SW sent referral to [facility] for tube feeds and TPN. HI intake reported that patient is open to a few different therapies with [facility] and \"She does have coverage for enteral feeds as long as via tube and TPN is also covered under her [employer] plan.\"    SW will continue to follow for discharge needs      IETAN Mujica  " [address]   Office: [phone]  Pager: [phone]  [email or web address]

## 2023-05-11 NOTE — PROGRESS NOTES
Admission/Transfer from: ED  2 RN skin assessment completed. YES w/ Norberto RN  Significant findings include: dexcom monitor on L upper arm, chest port access, G tube site, J tube site, blanchable redness to heels bilaterally.  WOC Nurse Consult Ordered? NO

## 2023-05-11 NOTE — PLAN OF CARE
Goal Outcome Evaluation:      Plan of Care Reviewed With: patient    Overall Patient Progress: improvingOverall Patient Progress: improving    Outcome Evaluation: A&Ox4, VSS on RA. Independent. Tube feeds running 30 mL/h for shift. Chest port infusing TPN 60 mL/h. Gtube site to drainage; G&J tube cites CDI. 4 eyes skin assessment complete; blanchable redness bilaterally to heels w/ no other significant findings. Scopolamine patch in place behind L ear. Pt reports 5/10 abdominal pain w/ nausea, PRN tylenol, benadryl, compazine, and ketorolac given x1. Continue w/ POC.

## 2023-05-12 ENCOUNTER — APPOINTMENT (OUTPATIENT)
Dept: EDUCATION SERVICES | Facility: CLINIC | Age: 58
End: 2023-05-12
Attending: STUDENT IN AN ORGANIZED HEALTH CARE EDUCATION/TRAINING PROGRAM
Payer: COMMERCIAL

## 2023-05-12 VITALS
RESPIRATION RATE: 20 BRPM | OXYGEN SATURATION: 95 % | HEART RATE: 83 BPM | SYSTOLIC BLOOD PRESSURE: 106 MMHG | BODY MASS INDEX: 19.25 KG/M2 | DIASTOLIC BLOOD PRESSURE: 60 MMHG | WEIGHT: 127 LBS | TEMPERATURE: 97.7 F | HEIGHT: 68 IN

## 2023-05-12 LAB
A-TOCOPHEROL VIT E SERPL-MCNC: 8.2 MG/L
ANION GAP SERPL CALCULATED.3IONS-SCNC: 10 MMOL/L (ref 7–15)
ANNOTATION COMMENT IMP: NORMAL
BACTERIA UR CULT: NO GROWTH
BETA+GAMMA TOCOPHEROL SERPL-MCNC: 0.2 MG/L
BUN SERPL-MCNC: 21.7 MG/DL (ref 6–20)
CALCIUM SERPL-MCNC: 9.4 MG/DL (ref 8.6–10)
CHLORIDE SERPL-SCNC: 102 MMOL/L (ref 98–107)
CREAT SERPL-MCNC: 0.59 MG/DL (ref 0.51–0.95)
DEPRECATED HCO3 PLAS-SCNC: 28 MMOL/L (ref 22–29)
GFR SERPL CREATININE-BSD FRML MDRD: >90 ML/MIN/1.73M2
GLUCOSE BLDC GLUCOMTR-MCNC: 123 MG/DL (ref 70–99)
GLUCOSE BLDC GLUCOMTR-MCNC: 155 MG/DL (ref 70–99)
GLUCOSE BLDC GLUCOMTR-MCNC: 166 MG/DL (ref 70–99)
GLUCOSE SERPL-MCNC: 120 MG/DL (ref 70–99)
MAGNESIUM SERPL-MCNC: 2.1 MG/DL (ref 1.7–2.3)
PHOSPHATE SERPL-MCNC: 4.1 MG/DL (ref 2.5–4.5)
POTASSIUM SERPL-SCNC: 4.7 MMOL/L (ref 3.4–5.3)
RETINYL PALMITATE SERPL-MCNC: <0.02 MG/L
SODIUM SERPL-SCNC: 140 MMOL/L (ref 136–145)
TRIGL SERPL-MCNC: 107 MG/DL
VIT A SERPL-MCNC: 0.46 MG/L

## 2023-05-12 PROCEDURE — 84478 ASSAY OF TRIGLYCERIDES: CPT | Performed by: STUDENT IN AN ORGANIZED HEALTH CARE EDUCATION/TRAINING PROGRAM

## 2023-05-12 PROCEDURE — C9113 INJ PANTOPRAZOLE SODIUM, VIA: HCPCS

## 2023-05-12 PROCEDURE — 250N000013 HC RX MED GY IP 250 OP 250 PS 637: Performed by: STUDENT IN AN ORGANIZED HEALTH CARE EDUCATION/TRAINING PROGRAM

## 2023-05-12 PROCEDURE — 250N000011 HC RX IP 250 OP 636: Performed by: STUDENT IN AN ORGANIZED HEALTH CARE EDUCATION/TRAINING PROGRAM

## 2023-05-12 PROCEDURE — 250N000013 HC RX MED GY IP 250 OP 250 PS 637

## 2023-05-12 PROCEDURE — 36415 COLL VENOUS BLD VENIPUNCTURE: CPT | Performed by: STUDENT IN AN ORGANIZED HEALTH CARE EDUCATION/TRAINING PROGRAM

## 2023-05-12 PROCEDURE — 250N000011 HC RX IP 250 OP 636

## 2023-05-12 PROCEDURE — 84100 ASSAY OF PHOSPHORUS: CPT | Performed by: STUDENT IN AN ORGANIZED HEALTH CARE EDUCATION/TRAINING PROGRAM

## 2023-05-12 PROCEDURE — 83735 ASSAY OF MAGNESIUM: CPT | Performed by: STUDENT IN AN ORGANIZED HEALTH CARE EDUCATION/TRAINING PROGRAM

## 2023-05-12 PROCEDURE — 99239 HOSP IP/OBS DSCHRG MGMT >30: CPT | Performed by: STUDENT IN AN ORGANIZED HEALTH CARE EDUCATION/TRAINING PROGRAM

## 2023-05-12 PROCEDURE — 80048 BASIC METABOLIC PNL TOTAL CA: CPT | Performed by: STUDENT IN AN ORGANIZED HEALTH CARE EDUCATION/TRAINING PROGRAM

## 2023-05-12 RX ORDER — KETOROLAC TROMETHAMINE 10 MG/1
10 TABLET, FILM COATED ORAL EVERY 6 HOURS PRN
Qty: 20 TABLET | Refills: 0 | Status: ON HOLD | OUTPATIENT
Start: 2023-05-12 | End: 2023-06-06

## 2023-05-12 RX ORDER — AMITRIPTYLINE HYDROCHLORIDE 10 MG/1
10 TABLET ORAL AT BEDTIME
Qty: 30 TABLET | Refills: 0 | Status: ON HOLD | OUTPATIENT
Start: 2023-05-12 | End: 2023-06-06

## 2023-05-12 RX ORDER — CETIRIZINE HYDROCHLORIDE 10 MG/1
10 TABLET ORAL DAILY
Qty: 30 TABLET | Refills: 11 | Status: SHIPPED | OUTPATIENT
Start: 2023-05-13 | End: 2024-05-01

## 2023-05-12 RX ORDER — LANOLIN ALCOHOL/MO/W.PET/CERES
100 CREAM (GRAM) TOPICAL DAILY
Qty: 30 TABLET | Refills: 11 | Status: SHIPPED | OUTPATIENT
Start: 2023-05-13 | End: 2023-12-05

## 2023-05-12 RX ORDER — FLUCONAZOLE 40 MG/ML
400 POWDER, FOR SUSPENSION ORAL DAILY
Qty: 170 ML | Refills: 0 | Status: SHIPPED | OUTPATIENT
Start: 2023-05-12 | End: 2023-05-29

## 2023-05-12 RX ORDER — SCOLOPAMINE TRANSDERMAL SYSTEM 1 MG/1
1 PATCH, EXTENDED RELEASE TRANSDERMAL
Qty: 10 PATCH | Refills: 11 | Status: SHIPPED | OUTPATIENT
Start: 2023-05-12 | End: 2024-02-27

## 2023-05-12 RX ADMIN — KETOROLAC TROMETHAMINE 15 MG: 15 INJECTION, SOLUTION INTRAMUSCULAR; INTRAVENOUS at 05:10

## 2023-05-12 RX ADMIN — PANTOPRAZOLE SODIUM 40 MG: 40 INJECTION, POWDER, FOR SOLUTION INTRAVENOUS at 08:15

## 2023-05-12 RX ADMIN — LEVOTHYROXINE SODIUM 137 MCG: 0.14 TABLET ORAL at 08:16

## 2023-05-12 RX ADMIN — DIPHENHYDRAMINE HYDROCHLORIDE 25 MG: 25 SOLUTION ORAL at 13:04

## 2023-05-12 RX ADMIN — METHOCARBAMOL 750 MG: 750 TABLET ORAL at 13:04

## 2023-05-12 RX ADMIN — METHOCARBAMOL 750 MG: 750 TABLET ORAL at 08:15

## 2023-05-12 RX ADMIN — THIAMINE HCL TAB 100 MG 100 MG: 100 TAB at 08:16

## 2023-05-12 RX ADMIN — FLUCONAZOLE 400 MG: 40 POWDER, FOR SUSPENSION ORAL at 08:15

## 2023-05-12 RX ADMIN — DIPHENHYDRAMINE HYDROCHLORIDE 25 MG: 25 SOLUTION ORAL at 05:09

## 2023-05-12 RX ADMIN — CETIRIZINE HYDROCHLORIDE 10 MG: 10 TABLET, FILM COATED ORAL at 08:16

## 2023-05-12 RX ADMIN — PROCHLORPERAZINE MALEATE 10 MG: 10 TABLET ORAL at 13:04

## 2023-05-12 RX ADMIN — PROCHLORPERAZINE MALEATE 10 MG: 10 TABLET ORAL at 05:09

## 2023-05-12 ASSESSMENT — ACTIVITIES OF DAILY LIVING (ADL)
ADLS_ACUITY_SCORE: 27

## 2023-05-12 NOTE — DISCHARGE SUMMARY
[facility]  Hospitalist Discharge Summary      Date of Admission:  5/8/2023  Date of Discharge:  5/12/2023  Discharging Provider: Santiago Lomax MD  Discharge Service: Hospitalist Service, GOLD TEAM 11    Discharge Diagnoses   h/o chronic pancreatitis   Initiation of TPN  Malnutrition     Clinically Significant Risk Factors          Follow-ups Needed After Discharge   Follow-up Appointments     Adult [facility] Follow-up and recommended labs and tests      Primary care within a month   Gastroenterology within a month     Appointments on [place] and/or [facility] (with [facility] or [facility]   provider or service). Call [phone] if you haven't heard regarding   these appointments within 7 days of discharge.         {Additional follow-up instructions/to-do's for PCP    :primary care, gastroenterology     Unresulted Labs Ordered in the Past 30 Days of this Admission     Date and Time Order Name Status Description    5/4/2023  1:54 PM Fungal or Yeast Culture Routine Preliminary       These results will be followed up by primary care     Discharge Disposition   Discharged to home  Condition at discharge: Stable    Hospital Course   Dinora Mcghee is a 58 year old female admitted on 5/8/2023. She has PMH of gallstone s/p cholecystectomy (1996), s/p multiple ERCP c/b recurrent pancreatitis, chronic abdominal pain, gastroparesis, chronic nausea and vomiting, chronic malnutrition/FTT on G-tube s/p multiple G/J-tube adjustments/laparotomy s/p TPIT (2016), Hx of GERD, hypothyroidism, uterine tumor s/p hysterectomy referred from the outpatient team for initiation of TPN.     Hospital course   Patient presented on 5/9 for initiation of TPN and inability to tolerate tube feeds at her goal rate, under the guidance of pharmacy tube feeds were started, and gradually her tube feeds were resumed to provide some feeds, To control her pain, IV ketorolac and amitriptyline were initiated, gi  did recommend praculopride but since its not formulary it was not started,  Her blood work was monitored for electrolyte derangements and she was subsequently discharged with adequate follow-ups      Chronic intermittent pancreatitis  Exacerbation of Chronic nausea and vomiting   chronic intermittent diarrhea  Worsening Chronic intermittent abdominal pain  GERD  Gastroparesis  - tube feeds resumed @ 30 ml/hour   -Started TPN from port daily infusion  -c/w pta Compazine, promethazine, Benadryl, scopolamine patch   Tylenol, Flexeril, ibuprofen, methocarbamol  -As needed oral ketorolac 10 mg Q 6 hourly (short course)  -Pantoprazole 40 mg daily     Candida esophagitis  -Continue fluconazole 400 mg suspension daily per G-tube for 21 day course     Hypothyroidism  -Continue PTA levothyroxine      History of pancreatectomy related DM  S/p islet cell transplant 12/28/2016        Consultations This Hospital Stay   NUTRITION SERVICES ADULT IP CONSULT  NUTRITION SERVICES ADULT IP CONSULT  GI LUMINAL ADULT IP CONSULT  PHARMACY/NUTRITION TO START AND MANAGE TPN  GI PANCREATICOBILIARY ADULT IP CONSULT  NUTRITION SERVICES ADULT IP CONSULT  NUTRITION SERVICES ADULT IP CONSULT  PHARMACY IP CONSULT  PHARMACY IP CONSULT  PHARMACY IP CONSULT  PATIENT LEARNING CENTER IP CONSULT  CARE MANAGEMENT / SOCIAL WORK IP CONSULT    Code Status   No CPR- Pre-arrest intubation OK    Time Spent on this Encounter   I, Santiago Lomax MD, personally saw the patient today and spent greater than 30 minutes discharging this patient.       Santiago Lomax MD  [facility] [address] [address]  Phone: [phone]  ______________________________________________________________________    Physical Exam   Vital Signs: Temp: 97.7  F (36.5  C) Temp src: Oral BP: 106/60 Pulse: 83   Resp: 20 SpO2: 95 % O2 Device: None (Room air)    Weight: 127 lbs 0 oz  Patient was comfortable, oriented times three  Lungs are clear   s1 and s2  heard   Abdomen was soft          Primary Care Physician   Juan Jose Gomez    Discharge Orders      Primary Care - Care Coordination Referral      Reason for your hospital stay    Dear Dinora Mcghee,    Your were hospitalized at [facility] with Inability to tolerate IV fluids and treated with TPN nutrition.  Over your hospitalization your condition improved and today you are ready to be discharged.  You should continue to improve but if you develop fever, shortness of breath, nausea, vomiting or abdominal pain please seek medical attention.    We are suggesting the following medication changes:  Daily TPN per home pharmacy   Start taking 10 mg amitriptyline daily and increase dose per your gastroenterologist   Take praculopride for gastroparesis related symptoms   Continue fluconazole for 21 day course to treat candida esophagitis   Take Toradol 10 mg Q-6 hourly as needed for pain     Please get the following tests done:  NA    Please set up an appointment with:  Primary care within a month   Gastroenterology within a month       It was a pleasure meeting with you today. Thank you for allowing me and my team the privilege of caring for you during your hospitalization. You are the reason we are here, and I truly hope we provided you with the excellent service you deserve. Please let us know if there is anything else we can do for you so that we can be sure you are leaving completely satisfied with your care experience.    If you have questions call your primary care physician.    Sincerely,    Santiago Lomax MD   Hospitalist  [facility]     Activity    Your activity upon discharge: activity as tolerated     Adult [facility] Follow-up and recommended labs and tests    Primary care within a month   Gastroenterology within a month     Appointments on [place] and/or [facility] (with [facility] or [facility] provider or service). Call [phone] if you haven't heard regarding these  appointments within 7 days of discharge.     Diet    Follow this diet upon discharge: Orders Placed This Encounter      Adult Formula Drip Feeding: Continuous Vivonex TEN; Jejunostomy; Goal Rate: begin Vivonex at 30 ml/hr, increase to 40 ml/hr as patient agrees/tolerates; mL/hr      Regular Diet Adult       Significant Results and Procedures   Most Recent 3 CBC's:Recent Labs   Lab Test 05/09/23  0724 05/09/23  0059 02/03/23  0716   WBC 6.0 6.0 11.0   HGB 12.6 12.1 11.7   MCV 95 95 97    425 320     Most Recent 3 BMP's:Recent Labs   Lab Test 05/12/23  1337 05/12/23  0743 05/12/23  0543 05/11/23  1200 05/11/23  0722 05/10/23  0921 05/10/23  0719   NA  --  140  --   --  139  --  139   POTASSIUM  --  4.7  --   --  4.4  --  4.1   CHLORIDE  --  102  --   --  102  --  100   CO2  --  28  --   --  27  --  27   BUN  --  21.7*  --   --  18.6  --  10.1   CR  --  0.59  --   --  0.59  --  0.54   ANIONGAP  --  10  --   --  10  --  12   KASSI  --  9.4  --   --  9.1  --  9.7   * 120* 166*   < > 133*   < > 121*    < > = values in this interval not displayed.     Most Recent 2 LFT's:Recent Labs   Lab Test 05/10/23  0719 05/09/23 0724   AST 43* 36*   ALT 67* 65*   ALKPHOS 125* 119*   BILITOTAL 0.5 0.5   ,   Results for orders placed or performed during the hospital encounter of 05/08/23   CT Abdomen Pelvis w Contrast    Narrative    EXAM: CT ABDOMEN PELVIS W CONTRAST  LOCATION: [facility]  DATE/TIME: 5/9/2023 2:55 AM CDT    INDICATION: abdominal pain, r o abscess, bowel obstruction  COMPARISON: 01/12/2023  TECHNIQUE: CT scan of the abdomen and pelvis was performed following injection of IV contrast. Multiplanar reformats were obtained. Dose reduction techniques were used.  CONTRAST: 78 mL Isovue-[id number]    FINDINGS:   LOWER CHEST: Mild basilar atelectasis.    HEPATOBILIARY: Cholecystectomy.    PANCREAS: Pancreatectomy.    SPLEEN: Splenectomy.    ADRENAL GLANDS:  Normal.    KIDNEYS/BLADDER: Normal.    BOWEL: Percutaneous jejunostomy. Bowel is normal caliber. Moderate amount of colonic stool. Peripheral gas in the right colon. Difficult to tell if this is between bowel content and the wall versus pneumatosis.    LYMPH NODES: Normal.    VASCULATURE: Unremarkable.    PELVIC ORGANS: Absent uterus.    MUSCULOSKELETAL: Stable lesion L2 vertebral body.      Impression    IMPRESSION:   1.  No bowel obstruction or abscess.  2.  Peripheral gas in the right colon which could be between bowel content and the wall of the right colon. Difficult to exclude possibility of pneumatosis. No visible bowel wall thickening or portal venous gas. Correlation with lactate and follow-up   suggested.     *Note: Due to a large number of results and/or encounters for the requested time period, some results have not been displayed. A complete set of results can be found in Results Review.       Discharge Medications   Discharge Medication List as of 5/12/2023  2:21 PM      START taking these medications    Details   amitriptyline (ELAVIL) 10 MG tablet Take 1 tablet (10 mg) by mouth At Bedtime, Disp-30 tablet, R-0, E-Prescribe      cetirizine (ZYRTEC) 10 MG tablet 1 tablet (10 mg) by Per G Tube route daily, Disp-30 tablet, R-11, E-Prescribe      ketorolac (TORADOL) 10 MG tablet Take 1 tablet (10 mg) by mouth every 6 hours as needed for moderate pain, Disp-20 tablet, R-0, E-Prescribe      melatonin 1 MG TABS tablet Take 1 tablet (1 mg) by mouth nightly as needed for sleep, Disp-30 tablet, R-0, E-Prescribe      pantoprazole (PROTONIX) 40 mg IV push injection Inject 40 mg into the vein daily (with breakfast), Disp-30 each, R-11, Local PrintPatient gets it from her home infusion      parenteral nutrition - PTA/DISCHARGE ORDER The TPN formula will print on the After Visit Summary Report., Disp-1 each, R-0, Local Print      thiamine (B-1) 100 MG tablet 1 tablet (100 mg) by Per J Tube route daily, Disp-30  tablet, R-11, E-Prescribe         CONTINUE these medications which have CHANGED    Details   fluconazole (DIFLUCAN) 40 MG/ML suspension Take 10 mLs (400 mg) by mouth daily for 17 days Take 10 mLs (400 mg) by mouth daily for 21 day course, Disp-170 mL, R-0, E-PrescribePlease take fluconazole for 21 days to treat candida in esophageus      scopolamine (TRANSDERM) 1 MG/3DAYS 72 hr patch Place 1 patch onto the skin every 72 hours, Disp-10 patch, R-11, E-Prescribe         CONTINUE these medications which have NOT CHANGED    Details   acetaminophen (TYLENOL) 325 MG/10.15ML solution 20.3 mLs (650 mg) by Per J Tube route every 6 hours as needed for mild pain or fever, Disp-473 mL, R-4, E-Prescribe      amylase-lipase-protease (CREON) 28128-13816 units CPEP per EC capsule Take 3-4 capsules by mouth 3 times daily (with meals) With oral meals, Disp-150 capsule, R-11, E-Prescribe      blood glucose (PAT CONTOUR NEXT) test strip Use to test blood sugar 6 times daily or as directed., Disp-2 Box, R-11, E-Prescribe      blood glucose monitoring (PAT MICROLET) lancets Use to test blood sugar 6-8 times daily or as directed., Disp-100 each, R-11, E-Prescribe      Continuous Blood Gluc Sensor (FREESTYLE LISA 2 SENSOR) [facility] USE ONE SENSOR ONCE EVERY 14 DAYS, Disp-2 each, R-11, E-Prescribe      cyclobenzaprine (FLEXERIL) 10 MG tablet 1 tablet (10 mg) by Per G Tube route 2 times daily as needed for muscle spasms, Disp-60 tablet, R-11, E-Prescribe      diphenhydrAMINE (BENADRYL ALLERGY) 25 MG capsule Take 1 capsule (25 mg) by mouth every 6 hours as needed for itching or allergies, Disp-56 capsule, R-0, No Print Out      estradiol (VAGIFEM) 10 MCG TABS vaginal tablet INSERT 1 TABLET(10 MCG) VAGINALLY 2 TIMES A WEEK, Disp-24 tablet, R-2, E-PrescribeThe patient requests that this prescription be held on file for filling in the near future.      famotidine (PEPCID) 40 MG/5ML suspension 2.5 mLs (20 mg) by Per J Tube route daily, Disp-50  "mL, R-4, E-Prescribe      glucagon 1 MG kit Give 0.1 to 0.15mg( 10-15 units on Insulin sryinge) subcutaneous  every 15 minutes PRN for hypoglycemia. Remix new kit q24hr. Needs up to 3 kit/week., Disp-10 each, R-3, E-Prescribe      glucose 40 % GEL gel Take 15-30 g by mouth every 15 minutes as needed for low blood sugarDisp-3 Tube, [id number]      ibuprofen (ADVIL/MOTRIN) 400 MG tablet Take 1 tablet (400 mg) by mouth every 6 hours as needed for moderate pain, Disp-30 tablet, R-0, E-Prescribe      insulin syringe-needle U-100 (30G X 1/2\" 0.3 ML) 30G X 1/2\" 0.3 ML miscellaneous Use 3 syringes daily or as directed.Disp-50 each, [id number]      LACTATED RINGERS IV Inject 1 L into the vein daily, Historical      levothyroxine (SYNTHROID/LEVOTHROID) 137 MCG tablet 137 mcg by Per G Tube route daily, Historical      methocarbamol (ROBAXIN) 750 MG tablet 1 tablet (750 mg) by Per Feeding Tube route 4 times daily, Disp-120 tablet, R-3, E-Prescribe      montelukast (SINGULAIR) 10 MG tablet 10 mg by Per G Tube route At Bedtime, Historical      Nutritional Supplements (BOOST HIGH PROTEIN) LIQD After above baseline labs are drawn, give: 6 mL/kg to maximum of 360 mL; the beverage is to be consumed within 5 minutes., R-0, No Print OutIf on pancreatic enzymes, patient may take home enzymes with Boost beverage.   Patients may take long acting insu lata (Levemir and Lantus).   Patient should NOT cover Boost with Novolog, Humalog, Apidra, or regular insulin.      promethazine-phenylephrine (PROMETHAZINE VC) 6.25-5 MG/5ML syrup 20 mLs (25 mg) by Per Feeding Tube route nightly as needed for nausea - Per Feeding Tube, Disp-473 mL, R-3, E-Prescribe      sennosides (SENOKOT) 8.8 MG/5ML syrup 5 mLs by Per J Tube route 2 times daily, Disp-236 mL, R-11, E-Prescribe      Sharps Container (BD SHARPS ) MISC 1 Container as needed, Disp-1 each, R-1, E-Prescribe      STATIN NOT PRESCRIBED (INTENTIONAL) Historical      zinc oxide - " white petrolatum (CRITIC-AID THICK MOIST BARRIER) 20-51% PSTE topical paste Apply 71 g topically every hour as needed for rash (Under J tube bumper when needed for skin protection), Disp-170 g, R-0, E-Prescribe      ZOLMitriptan (ZOMIG-ZMT) 5 MG ODT Take 1 tablet (5 mg) by mouth at onset of headache for migraine May repeat in 2 hours. Max 2 tablets/24 hours., Disp-18 tablet, R-6, E-Prescribe         STOP taking these medications       Acetaminophen Childrens 160 MG/5ML SUSP Comments:   Reason for Stopping:         Lansoprazole (PREVACID IV) Comments:   Reason for Stopping:         prochlorperazine (COMPAZINE) 10 MG tablet Comments:   Reason for Stopping:             Allergies   Allergies   Allergen Reactions     Apple Juice Anaphylaxis     Corticosteroids Other (See Comments)     All oral, IV and injectable steroids are contraindicated (unless in life threatening situations) in [employer] transplant recipients. They can cause irreversible loss of islet cell function. Please contact patient's transplant care coordinator ADI Gaffney RN at [phone]/pager [phone] and/or endocrinologist prior to administration.       Depakote [Divalproex Sodium] Other (See Comments)     Chest pain     Zithromax [Azithromycin Dihydrate] Anaphylaxis     Noted in 4/7/08 ER     Bromfenac      Other reaction(s): Headache     Codeine      Other reaction(s): Hallucinations     Darvocet [Propoxyphene N-Apap] Itching     Morphine Nausea and Vomiting and Rash     Nalbuphine Hcl Rash     RASH :nubaine     Zosyn [Piperacillin-Tazobactam In Dex] Rash     Possible allergy, did have a diffuse rash that seemed drug related but could have also been related to soap in the hospital.      Bactrim [Sulfamethoxazole W-Trimethoprim] Other (See Comments) and Nausea and Vomiting     Severely low liver function.     Statins Other (See Comments)     Previous liver issues, risks vs benefits felt to not warrant statin.  Discussed Oct 2022 visit     Tape  [Adhesive Tape] Blisters     Tramadol      Zofran [Ondansetron] Other (See Comments)     migraine     Flagyl [Metronidazole] Hives and Rash

## 2023-05-12 NOTE — PROGRESS NOTES
Care Management Discharge Note    Discharge Date: 05/12/2023       Discharge Disposition: Home, Home Care, Home Infusion    Discharge Services: None    Discharge DME: None    Discharge Transportation: family or friend will provide    Private pay costs discussed: Not applicable    Does the patient's insurance plan have a 3 day qualifying hospital stay waiver?  No    PAS Confirmation Code:  N/A  Patient/family educated on Medicare website which has current facility and service quality ratings: no    Education Provided on the Discharge Plan:  yes  Persons Notified of Discharge Plans: Patient, provider, [facility] care, [facility], nursing staff,   Patient/Family in Agreement with the Plan:  yes    Handoff Referral Completed: Yes    Additional Information:  Patient will discharge home with tube feeds and TPN. [facility] following. The patient receives home care through [facility]. XANDER faxed discharge orders to [facility]. Patient's family will provide transportation home.      EITAN Mujica  [address]   Office: [phone]  Pager: [phone]  [email or web address]

## 2023-05-12 NOTE — CONSULTS
Met with patient for TPN education and demonstration. Patient demonstrated drawing up and adding vitamins to TPN bag, spiking the TPN bag, loading tubing into CADD pump and priming tubing. Patient has gone home with port accessed and has done home infusions before, so she feels comfortable with flushing line. She verbalized that she would flush line with saline before hooking up to TPN. She correctly demonstrated how to hook up TPN and start the infusion. She practiced disconnecting TPN when infusion complete and verbalized flushing the line with saline and then heparin locking if infusion complete. Patient was very engaged in education and asked good questions. She verbalized understanding of the education material.     Literature given: Handwashing and Skin Care and TPN Therapy Using a CADD Pump

## 2023-05-12 NOTE — PLAN OF CARE
Goal Outcome Evaluation: Ongoing   Alert and oriented x4, on room air. Able to make needs known. C/o pain and nausea, PRN benadryl, compazine, and Toradol given x1. Tube feeds running @ 30 mL/hr. Takes medications through G-tube to gravity. Patient takes meds through G-tube. Had continuous TPN running @ 60 mL/hr. TPN teaching with Patient Learning center schedule at 10:30 am before discharge. Adequate urine output, see flowsheet for recording. Possible discharge tomorrow. Continue to monitor.

## 2023-05-12 NOTE — PLAN OF CARE
Goal Outcome Evaluation:      Plan of Care Reviewed With: patient    Overall Patient Progress: improvingOverall Patient Progress: improving    Outcome Evaluation: A&Ox4, VSS on RA. Independent. TF running 30 mL/h through J tube, TPN & lipids running through R chest port. G tube site for meds. LBM 5/9. Pt reports abdominal pain and nausea; benadryl, compazine, tylenol, and torodol all given x2. Small emesis x1 this morning. Pt to discharge w/ home TPN infusion, anticipated for 4/13.Continue w/ POC.

## 2023-05-12 NOTE — PLAN OF CARE
Goal Outcome Evaluation:      Plan of Care Reviewed With: patient, spouse          Outcome Evaluation: A&O x4. VSS on RA. Up ad koffi. TF running @ 30ml/hr, unable to tolerate TF @ 40ml/hr. TPN through R chest port. G tube intermittenly to gravity. 2 Loose BM today. Voiding adequete amounts. PRN medication given for nausea. Pain managed with scheduled robaxin.    Discharge home w/ . Home infusion set up and supplies/meds brought to hospital and sent home with pt & family. AVS reviewed and all questions answered. All belongings sent home.

## 2023-05-16 ENCOUNTER — TRANSFERRED RECORDS (OUTPATIENT)
Dept: HEALTH INFORMATION MANAGEMENT | Facility: CLINIC | Age: 58
End: 2023-05-16
Payer: COMMERCIAL

## 2023-05-18 ENCOUNTER — DOCUMENTATION ONLY (OUTPATIENT)
Dept: INTERNAL MEDICINE | Facility: CLINIC | Age: 58
End: 2023-05-18
Payer: COMMERCIAL

## 2023-05-18 ENCOUNTER — TELEPHONE (OUTPATIENT)
Dept: GASTROENTEROLOGY | Facility: CLINIC | Age: 58
End: 2023-05-18
Payer: COMMERCIAL

## 2023-05-18 DIAGNOSIS — K31.84 GASTROPARESIS: ICD-10-CM

## 2023-05-18 DIAGNOSIS — K59.00 CONSTIPATION, UNSPECIFIED CONSTIPATION TYPE: ICD-10-CM

## 2023-05-18 DIAGNOSIS — R10.84 ABDOMINAL PAIN, GENERALIZED: Primary | ICD-10-CM

## 2023-05-18 DIAGNOSIS — K58.9 IRRITABLE BOWEL SYNDROME, UNSPECIFIED TYPE: Primary | ICD-10-CM

## 2023-05-18 DIAGNOSIS — K59.01 SLOW TRANSIT CONSTIPATION: ICD-10-CM

## 2023-05-18 DIAGNOSIS — K59.04 CHRONIC IDIOPATHIC CONSTIPATION: ICD-10-CM

## 2023-05-18 RX ORDER — PRUCALOPRIDE 2 MG/1
2 TABLET, FILM COATED ORAL DAILY
Qty: 30 TABLET | Refills: 6 | Status: ON HOLD | OUTPATIENT
Start: 2023-05-18 | End: 2023-06-07

## 2023-05-18 RX ORDER — AMITRIPTYLINE HYDROCHLORIDE 10 MG/1
20 TABLET ORAL AT BEDTIME
Qty: 50 TABLET | Refills: 0 | Status: ON HOLD | OUTPATIENT
Start: 2023-05-18 | End: 2023-06-07

## 2023-05-18 NOTE — TELEPHONE ENCOUNTER
Prior Authorization Retail Medication Request    Medication/Dose:    Disp Refills Start End MARCOS   prucalopride succinate (MOTEGRITY) 2 MG tablet 30 tablet 6 5/18/2023  --   Sig - Route: Take 1 tablet (2 mg) by mouth daily - Oral   Class: E-Prescribe   Order: [id number]   Prior authorization: Payer Waiting for Response           ICD code (if different than what is on RX):    Previously Tried and Failed:    Rationale:      Insurance Name:   Insurance ID:        Pharmacy Information (if different than what is on RX)  Name:   Phone:

## 2023-05-18 NOTE — PROGRESS NOTES
Type of Form Received:     Form Received (Date) 5/18/23   Form Filled out Yes 5/19/23   Placed in provider folder Yes

## 2023-05-19 ENCOUNTER — MEDICAL CORRESPONDENCE (OUTPATIENT)
Dept: HEALTH INFORMATION MANAGEMENT | Facility: CLINIC | Age: 58
End: 2023-05-19
Payer: COMMERCIAL

## 2023-05-22 ENCOUNTER — TELEPHONE (OUTPATIENT)
Dept: NEUROLOGY | Facility: CLINIC | Age: 58
End: 2023-05-22
Payer: COMMERCIAL

## 2023-05-22 NOTE — TELEPHONE ENCOUNTER
Prior Authorization Approval    Medication: PRUCALOPRIDE SUCCINATE 2 MG PO TABS  Authorization Effective Date: 6/10/2023  Authorization Expiration Date: 6/10/2024  Approved Dose/Quantity:   Reference #:     Insurance Company: [employer] - Phone [phone] Fax [phone]  Expected CoPay:       CoPay Card Available:      Financial Assistance Needed:   Which Pharmacy is filling the prescription: [facility] - [place], [place] - [address]  Pharmacy Notified:  No  Patient Notified:  No    PA renewed.

## 2023-05-23 DIAGNOSIS — E10.649 TYPE 1 DIABETES MELLITUS WITH HYPOGLYCEMIA AND WITHOUT COMA (H): Primary | ICD-10-CM

## 2023-05-23 DIAGNOSIS — G43.709 CHRONIC MIGRAINE W/O AURA W/O STATUS MIGRAINOSUS, NOT INTRACTABLE: Primary | ICD-10-CM

## 2023-05-23 RX ORDER — BLOOD-GLUCOSE SENSOR
1 EACH MISCELLANEOUS
Qty: 2 EACH | Refills: 11 | Status: SHIPPED | OUTPATIENT
Start: 2023-05-23 | End: 2023-11-09

## 2023-05-24 ENCOUNTER — VIRTUAL VISIT (OUTPATIENT)
Dept: GASTROENTEROLOGY | Facility: CLINIC | Age: 58
End: 2023-05-24
Payer: COMMERCIAL

## 2023-05-24 ENCOUNTER — TELEPHONE (OUTPATIENT)
Dept: GASTROENTEROLOGY | Facility: CLINIC | Age: 58
End: 2023-05-24

## 2023-05-24 ENCOUNTER — TELEPHONE (OUTPATIENT)
Dept: ENDOCRINOLOGY | Facility: CLINIC | Age: 58
End: 2023-05-24

## 2023-05-24 DIAGNOSIS — Z87.19 HISTORY OF FOOD INTOLERANCE: Primary | ICD-10-CM

## 2023-05-24 DIAGNOSIS — G43.709 CHRONIC MIGRAINE W/O AURA W/O STATUS MIGRAINOSUS, NOT INTRACTABLE: ICD-10-CM

## 2023-05-24 DIAGNOSIS — G43.719 INTRACTABLE CHRONIC MIGRAINE WITHOUT AURA AND WITHOUT STATUS MIGRAINOSUS: Primary | ICD-10-CM

## 2023-05-24 PROCEDURE — 99214 OFFICE O/P EST MOD 30 MIN: CPT | Mod: VID | Performed by: INTERNAL MEDICINE

## 2023-05-24 NOTE — TELEPHONE ENCOUNTER
TOYIN, sent myc to inform pt that their appt on 9/6/23 with Dr. Genao will be shifted back to 9:20 am instead of 9:00 am due to a template change. Left call center # if pt has any questions/concerns or needs to reschedule.

## 2023-05-24 NOTE — NURSING NOTE
Is the patient currently in the state of [place]? YES    Visit mode:VIDEO    If the visit is dropped, the patient can be reconnected by: VIDEO VISIT: Text to cell phone: [phone]    Will anyone else be joining the visit? NO      How would you like to obtain your AVS? MyChart    Are changes needed to the allergy or medication list? NO    Reason for visit: RECHECK

## 2023-05-24 NOTE — TELEPHONE ENCOUNTER
Dinora Farias mentioned that you are considering adding insulin to her TPN?  We have a delivery going out to her today.     Current TPN contains NO insulin, 150 gm dextrose, and is infused over 18 hrs with 1 hr taper up and down.      Please let me know if you'd like to add insulin and if you feel we can continue cycling, maybe 14 hrs?    Thank you!    Rossi Max, PharmD [facility]   Ph: [phone]  Fax: [phone]

## 2023-05-24 NOTE — PROGRESS NOTES
Dinora is following up after a hospitalization to initiate TPN.  She has been unable to tolerate J-tube feedings any more than 30 cc an hour and was becoming severely weak and malnourished.  She now says she is feels the best she has in many months having energy able to sleep better.  She still on the 30 cc an hour tube feeds and eating limited amounts of foods such as mashed potatoes.  She has gained 4 pounds her other issue is still having episodes of crampy left upper quadrant pain that feels like partial bowel obstructions but relieved in 15 minutes and with a bowel movement.  She had 4 bowel movements today including watery loose stool.  The other big development is she saw [facility] pain clinic that she really likes they are going to evaluate her for a spinal cord stimulator and avoid opioids.  They have her seeing a health psychologist as well as physical therapy.  Overall she seems as well as she has been in 6 to 12 months.    Main issues are  #1 how long to keep her on TPN and whether to do holidays from TPN  #2 abnormal liver chemistries as below which are very likely TPN related but will track those periodically  #3 follow-up with me in 2 months  #4 add to discussion list for Wednesday conference    Total time is 28 minutes on the video visit and an additional 12 minutes reviewing data prior to the visit for total of 40 minutes    Aspartate Aminotransferase (AST), P 68 High  111 High  -- -- 23 36   Alkaline Phosphatase, P 157 High  159 High  -- -- 108 High  90   Alanine Aminotransferase (ALT), P 144 High  140 High  -- -- 31 41     Joined the call at 5/24/2023, 9:37:00?am.  Left the call at 5/24/2023, 10:05:26?am.  You were on the call for 28 minutes 26 seconds .

## 2023-05-24 NOTE — LETTER
5/24/2023         RE: Dinora Mcghee  [address] [facility] [address]        Dear Colleague,    Thank you for referring your patient, Dinora Mcghee, to the [facility] [place]. Please see a copy of my visit note below.    Dinora is following up after a hospitalization to initiate TPN.  She has been unable to tolerate J-tube feedings any more than 30 cc an hour and was becoming severely weak and malnourished.  She now says she is feels the best she has in many months having energy able to sleep better.  She still on the 30 cc an hour tube feeds and eating limited amounts of foods such as mashed potatoes.  She has gained 4 pounds her other issue is still having episodes of crampy left upper quadrant pain that feels like partial bowel obstructions but relieved in 15 minutes and with a bowel movement.  She had 4 bowel movements today including watery loose stool.  The other big development is she saw [facility] pain clinic that she really likes they are going to evaluate her for a spinal cord stimulator and avoid opioids.  They have her seeing a health psychologist as well as physical therapy.  Overall she seems as well as she has been in 6 to 12 months.    Main issues are  #1 how long to keep her on TPN and whether to do holidays from TPN  #2 abnormal liver chemistries as below which are very likely TPN related but will track those periodically  #3 follow-up with me in 2 months  #4 add to discussion list for Wednesday conference    Total time is 28 minutes on the video visit and an additional 12 minutes reviewing data prior to the visit for total of 40 minutes    Aspartate Aminotransferase (AST), P 68 High  111 High  -- -- 23 36   Alkaline Phosphatase, P 157 High  159 High  -- -- 108 High  90   Alanine Aminotransferase (ALT), P 144 High  140 High  -- -- 31 41     Joined the call at 5/24/2023, 9:37:00?am.  Left the call at 5/24/2023, 10:05:26?am.  You were on the call  for 28 minutes 26 seconds .            Again, thank you for allowing me to participate in the care of your patient.      Sincerely,    Mandeep Park MD

## 2023-05-25 ENCOUNTER — VIRTUAL VISIT (OUTPATIENT)
Dept: ONCOLOGY | Facility: CLINIC | Age: 58
End: 2023-05-25
Attending: SOCIAL WORKER
Payer: COMMERCIAL

## 2023-05-25 ENCOUNTER — PATIENT OUTREACH (OUTPATIENT)
Dept: GASTROENTEROLOGY | Facility: CLINIC | Age: 58
End: 2023-05-25
Payer: COMMERCIAL

## 2023-05-25 DIAGNOSIS — F43.23 ADJUSTMENT DISORDER WITH MIXED ANXIETY AND DEPRESSED MOOD: Primary | ICD-10-CM

## 2023-05-25 DIAGNOSIS — Z51.5 PALLIATIVE CARE PATIENT: ICD-10-CM

## 2023-05-25 PROCEDURE — 90834 PSYTX W PT 45 MINUTES: CPT | Mod: VID | Performed by: SOCIAL WORKER

## 2023-05-25 NOTE — LETTER
5/25/2023         RE: Dinora Mcghee  [address] [facility] [address]        Dear Colleague,    Thank you for referring your patient, Dinora Mcghee, to the [facility] [place]. Please see a copy of my visit note below.    Telemedicine Visit: The patient's condition can be safely assessed and treated via synchronous audio and visual telemedicine encounter.      Reason for Telemedicine Visit: Patient has requested telehealth visit    Originating Site (Patient Location): Patient's home        Distant Location (provider location):  On-site    Consent:  The patient/guardian has verbally consented to: the potential risks and benefits of telemedicine (video visit) versus in person care; bill my insurance or make self-payment for services provided; and responsibility for payment of non-covered services.     Mode of Communication:  Video Conference via [employer]    As the provider I attest to compliance with applicable laws and regulations related to telemedicine.    Palliative Care Clinical Social Work Return Appointment    PLEASE NOTE:  THIS IS A MENTAL HEALTH NOTE.  OTHER PROVIDERS VIEWING THIS NOTE SHOULD USE THIS ONLY FOR UNDERSTANDING THE CONTEXT OF THE PATIENT'S EXPERIENCE.  TOPICS DESCRIBED IN THIS NOTE SHOULD NOT BE REFERENCED TO THE PATIENT BY MEDICAL PROVIDERS.    Dinora Mcghee is a 58  year old woman with multiple medical conditions including chronic pancreatitis s/p TPAIT, long term complications from gastroparesis, constipation, GERD, migraines.  Had GJ placed Sept 2021, recovery was complicated and she ended up with a venting g-tube.  Due to complications with PO tolerance she had surgical placement of a J tube, and a resection of densely adhered small bowel.  Started on tube feedings, now on TPN seen today via [employer] video for a return psychotherapy appointment to address adjustment disorder with anxiety and depressed mood as it relates to coping with illness and  "treatment.    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  She is feeling better on the TPN than she was on the TF's, but still has pain and low energy.  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  Meditating, knitting, tapping.  Uses EMDR techniques.    Looking forward to 3 weddings, a class reunion,     Helpful cognitions: "     Unhelpful and/or triggering or exacerbating factors:     Body-Mind Skills Education: uses tapping and EMDR     Relationships:  and children, extended family    End of Life and Advance Care Planning:     Main therapeutic interventions provided this session include:     Provide psychoeducation, Facilitate processing of thoughts and feelings and Meaning Centered Psychotherapy Session 1    Plan:  Will return for psychotherapy with palliative care focus in 1 week    Time spent with patient/family:  54 (Start 2:30, end 3:24)    JAIDA Nguyen, [facility]   Palliative Care Clinical   Ph: [phone]      DO NOT SEND ANY LETTERS                    Again, thank you for allowing me to participate in the care of your patient.        Sincerely,        VASQUEZ Nguyen

## 2023-05-25 NOTE — PROGRESS NOTES
Telemedicine Visit: The patient's condition can be safely assessed and treated via synchronous audio and visual telemedicine encounter.      Reason for Telemedicine Visit: Patient has requested telehealth visit    Originating Site (Patient Location): Patient's home        Distant Location (provider location):  On-site    Consent:  The patient/guardian has verbally consented to: the potential risks and benefits of telemedicine (video visit) versus in person care; bill my insurance or make self-payment for services provided; and responsibility for payment of non-covered services.     Mode of Communication:  Video Conference via [employer]    As the provider I attest to compliance with applicable laws and regulations related to telemedicine.    Palliative Care Clinical Social Work Return Appointment    PLEASE NOTE:  THIS IS A MENTAL HEALTH NOTE.  OTHER PROVIDERS VIEWING THIS NOTE SHOULD USE THIS ONLY FOR UNDERSTANDING THE CONTEXT OF THE PATIENT'S EXPERIENCE.  TOPICS DESCRIBED IN THIS NOTE SHOULD NOT BE REFERENCED TO THE PATIENT BY MEDICAL PROVIDERS.    Dinora Mcghee is a 58  year old woman with multiple medical conditions including chronic pancreatitis s/p TPAIT, long term complications from gastroparesis, constipation, GERD, migraines.  Had GJ placed Sept 2021, recovery was complicated and she ended up with a venting g-tube.  Due to complications with PO tolerance she had surgical placement of a J tube, and a resection of densely adhered small bowel.  Started on tube feedings, now on TPN seen today via [employer] video for a return psychotherapy appointment to address adjustment disorder with anxiety and depressed mood as it relates to coping with illness and treatment.    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  She is feeling better on the TPN than she was on the TF's, but still has pain and low energy.  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  Meditating, knitting, tapping.  Uses EMDR techniques.    Looking forward to 3 weddings, a class reunion,     Helpful cognitions:     Unhelpful and/or triggering or exacerbating factors:     Body-Mind Skills Education: uses tapping and EMDR     Relationships:  and children, extended family    End of Life and Advance Care Planning:     Main therapeutic interventions provided this session " include:     Provide psychoeducation, Facilitate processing of thoughts and feelings and Meaning Centered Psychotherapy Session 1    Plan:  Will return for psychotherapy with palliative care focus in 1 week    Time spent with patient/family:  54 (Start 2:30, end 3:24)    JAIDA Nguyen, [facility]   Palliative Care Clinical   Ph: [phone]      DO NOT SEND ANY LETTERS

## 2023-05-25 NOTE — LETTER
5/25/2023         RE: Dinora Mcghee  [address] [facility] [address]        Dear Colleague,    Thank you for referring your patient, Dinora Mcghee, to the [facility] [place]. Please see a copy of my visit note below.    Telemedicine Visit: The patient's condition can be safely assessed and treated via synchronous audio and visual telemedicine encounter.      Reason for Telemedicine Visit: Patient has requested telehealth visit    Originating Site (Patient Location): Patient's home        Distant Location (provider location):  On-site    Consent:  The patient/guardian has verbally consented to: the potential risks and benefits of telemedicine (video visit) versus in person care; bill my insurance or make self-payment for services provided; and responsibility for payment of non-covered services.     Mode of Communication:  Video Conference via [employer]    As the provider I attest to compliance with applicable laws and regulations related to telemedicine.    Palliative Care Clinical Social Work Return Appointment    PLEASE NOTE:  THIS IS A MENTAL HEALTH NOTE.  OTHER PROVIDERS VIEWING THIS NOTE SHOULD USE THIS ONLY FOR UNDERSTANDING THE CONTEXT OF THE PATIENT'S EXPERIENCE.  TOPICS DESCRIBED IN THIS NOTE SHOULD NOT BE REFERENCED TO THE PATIENT BY MEDICAL PROVIDERS.    Dinora Mcghee is a 58  year old woman with multiple medical conditions including chronic pancreatitis s/p TPAIT, long term complications from gastroparesis, constipation, GERD, migraines.  Had GJ placed Sept 2021, recovery was complicated and she ended up with a venting g-tube.  Due to complications with PO tolerance she had surgical placement of a J tube, and a resection of densely adhered small bowel.  Started on tube feedings, now on TPN seen today via [employer] video for a return psychotherapy appointment to address adjustment disorder with anxiety and depressed mood as it relates to coping with illness and  "treatment.    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  She is feeling better on the TPN than she was on the TF's, but still has pain and low energy.  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  Meditating, knitting, tapping.  Uses EMDR techniques.    Looking forward to 3 weddings, a class reunion,     Helpful cognitions: "     Unhelpful and/or triggering or exacerbating factors:     Body-Mind Skills Education: uses tapping and EMDR     Relationships:  and children, extended family    End of Life and Advance Care Planning:     Main therapeutic interventions provided this session include:     Provide psychoeducation, Facilitate processing of thoughts and feelings and Meaning Centered Psychotherapy Session 1    Plan:  Will return for psychotherapy with palliative care focus in 1 week    Time spent with patient/family:  54 (Start 2:30, end 3:24)    JAIDA Nguyen, [facility]   Palliative Care Clinical   Ph: [phone]      DO NOT SEND ANY LETTERS                    Again, thank you for allowing me to participate in the care of your patient.        Sincerely,        VASQUEZ Nguyen

## 2023-05-25 NOTE — TELEPHONE ENCOUNTER
Sorry, I forgot to mention we do not send out vials of insulin here at [facility].  Draw up the dose to be added in a syringe.  If significant adjustments are needed to increase insulin added then we would send a new delivery.    Thank you!

## 2023-05-25 NOTE — TELEPHONE ENCOUNTER
Per clinic with Dr. Park  Main issues are  #1 how long to keep her on TPN and whether to do holidays from TPN  #2 abnormal liver chemistries as below which are very likely TPN related but will track those periodically  #3 follow-up with me in 2 months  #4 add to discussion list for Wednesday conference    Labs 5/22/23  Aspartate Aminotransferase (AST), P 8 - 43 U/L 68 High      Alkaline Phosphatase, P 35 - 104 U/L 157 High      Alanine Aminotransferase (ALT), P 7 - 45 U/L 144 High        Per [facility] discussion, minimal TPN best as patient is taking some PO and tolerating trophic tube feeds. Called [facility] to discuss:    Talked to dietitian Melissa at [facility], getting lipids twice a week, lipids optimized for hepatic functions- 150g dextrose, 55g protient- 873 kcal per day, assessed needs 1430-1730kcal per day,so getting low dose TPN with oral intake/feeds factored in. Patient now cycled, is working with Dr Melissa on that to help manage blood sugard.  Confirmed patient is on minimal amout of TPN at this time. Watching weight gain . They will continue to monitor labs and coordinate as needed.       Update sent to Dr. Park, follow up clinic scheduled, Mychart sent    ML

## 2023-06-01 LAB — BACTERIA BRO BRUSH AEROBE CULT: ABNORMAL

## 2023-06-02 ENCOUNTER — ANCILLARY PROCEDURE (OUTPATIENT)
Dept: MAMMOGRAPHY | Facility: CLINIC | Age: 58
End: 2023-06-02
Attending: INTERNAL MEDICINE
Payer: COMMERCIAL

## 2023-06-02 DIAGNOSIS — Z12.31 VISIT FOR SCREENING MAMMOGRAM: ICD-10-CM

## 2023-06-02 PROCEDURE — 77067 SCR MAMMO BI INCL CAD: CPT

## 2023-06-02 PROCEDURE — 77063 BREAST TOMOSYNTHESIS BI: CPT

## 2023-06-04 ENCOUNTER — APPOINTMENT (OUTPATIENT)
Dept: GENERAL RADIOLOGY | Facility: CLINIC | Age: 58
End: 2023-06-04
Attending: PHYSICIAN ASSISTANT
Payer: COMMERCIAL

## 2023-06-04 ENCOUNTER — HOSPITAL ENCOUNTER (INPATIENT)
Facility: CLINIC | Age: 58
LOS: 6 days | Discharge: HOME HEALTH | End: 2023-06-10
Attending: EMERGENCY MEDICINE | Admitting: STUDENT IN AN ORGANIZED HEALTH CARE EDUCATION/TRAINING PROGRAM
Payer: COMMERCIAL

## 2023-06-04 DIAGNOSIS — E13.9 POST-PANCREATECTOMY DIABETES (H): ICD-10-CM

## 2023-06-04 DIAGNOSIS — G89.18 ACUTE POST-OPERATIVE PAIN: ICD-10-CM

## 2023-06-04 DIAGNOSIS — K86.1 CHRONIC PANCREATITIS, UNSPECIFIED PANCREATITIS TYPE (H): ICD-10-CM

## 2023-06-04 DIAGNOSIS — R50.9 FEVER, UNSPECIFIED FEVER CAUSE: ICD-10-CM

## 2023-06-04 DIAGNOSIS — K59.01 SLOW TRANSIT CONSTIPATION: ICD-10-CM

## 2023-06-04 DIAGNOSIS — K83.09 CHOLANGITIS (H): Primary | ICD-10-CM

## 2023-06-04 DIAGNOSIS — R10.84 ABDOMINAL PAIN, GENERALIZED: ICD-10-CM

## 2023-06-04 DIAGNOSIS — R63.39 FEEDING INTOLERANCE: ICD-10-CM

## 2023-06-04 DIAGNOSIS — E46 MALNUTRITION, UNSPECIFIED TYPE (H): ICD-10-CM

## 2023-06-04 DIAGNOSIS — Z78.9 ON TOTAL PARENTERAL NUTRITION (TPN): ICD-10-CM

## 2023-06-04 DIAGNOSIS — R11.2 NAUSEA AND VOMITING, UNSPECIFIED VOMITING TYPE: ICD-10-CM

## 2023-06-04 DIAGNOSIS — E23.2 DIABETES INSIPIDUS (H): ICD-10-CM

## 2023-06-04 DIAGNOSIS — E89.1 POST-PANCREATECTOMY DIABETES (H): ICD-10-CM

## 2023-06-04 DIAGNOSIS — K59.00 CONSTIPATION, UNSPECIFIED CONSTIPATION TYPE: ICD-10-CM

## 2023-06-04 DIAGNOSIS — L29.9 ITCHING: ICD-10-CM

## 2023-06-04 DIAGNOSIS — R10.13 EPIGASTRIC PAIN: ICD-10-CM

## 2023-06-04 DIAGNOSIS — K31.84 GASTROPARESIS: ICD-10-CM

## 2023-06-04 DIAGNOSIS — K86.89 PANCREATIC INSUFFICIENCY: ICD-10-CM

## 2023-06-04 DIAGNOSIS — Z78.9 ENCOUNTER FOR GASTROJEJUNAL (GJ) TUBE PLACEMENT: ICD-10-CM

## 2023-06-04 DIAGNOSIS — Z90.410 POST-PANCREATECTOMY DIABETES (H): ICD-10-CM

## 2023-06-04 DIAGNOSIS — K59.04 CHRONIC IDIOPATHIC CONSTIPATION: ICD-10-CM

## 2023-06-04 DIAGNOSIS — G43.109 MIGRAINE WITH AURA AND WITHOUT STATUS MIGRAINOSUS, NOT INTRACTABLE: ICD-10-CM

## 2023-06-04 DIAGNOSIS — Z90.410 HISTORY OF PANCREATECTOMY: ICD-10-CM

## 2023-06-04 DIAGNOSIS — Z93.1 GASTROSTOMY STATUS (H): ICD-10-CM

## 2023-06-04 DIAGNOSIS — K58.9 IRRITABLE BOWEL SYNDROME, UNSPECIFIED TYPE: ICD-10-CM

## 2023-06-04 LAB
ALBUMIN SERPL BCG-MCNC: 3.6 G/DL (ref 3.5–5.2)
ALBUMIN UR-MCNC: NEGATIVE MG/DL
ALP SERPL-CCNC: 281 U/L (ref 35–104)
ALT SERPL W P-5'-P-CCNC: 434 U/L (ref 10–35)
ANION GAP SERPL CALCULATED.3IONS-SCNC: 14 MMOL/L (ref 7–15)
APAP SERPL-MCNC: 15.6 UG/ML (ref 10–30)
APPEARANCE UR: CLEAR
AST SERPL W P-5'-P-CCNC: 305 U/L (ref 10–35)
BACTERIA #/AREA URNS HPF: ABNORMAL /HPF
BASOPHILS # BLD AUTO: 0 10E3/UL (ref 0–0.2)
BASOPHILS NFR BLD AUTO: 0 %
BILIRUB SERPL-MCNC: 0.7 MG/DL
BILIRUB UR QL STRIP: NEGATIVE
BUN SERPL-MCNC: 11 MG/DL (ref 6–20)
CALCIUM SERPL-MCNC: 8.9 MG/DL (ref 8.6–10)
CHLORIDE SERPL-SCNC: 105 MMOL/L (ref 98–107)
COLOR UR AUTO: YELLOW
CREAT SERPL-MCNC: 0.5 MG/DL (ref 0.51–0.95)
CRP SERPL-MCNC: 63.7 MG/L
DEPRECATED HCO3 PLAS-SCNC: 21 MMOL/L (ref 22–29)
EOSINOPHIL # BLD AUTO: 0 10E3/UL (ref 0–0.7)
EOSINOPHIL NFR BLD AUTO: 0 %
ERYTHROCYTE [DISTWIDTH] IN BLOOD BY AUTOMATED COUNT: 14.4 % (ref 10–15)
GFR SERPL CREATININE-BSD FRML MDRD: >90 ML/MIN/1.73M2
GGT SERPL-CCNC: 331 U/L (ref 5–36)
GLUCOSE BLDC GLUCOMTR-MCNC: 117 MG/DL (ref 70–99)
GLUCOSE SERPL-MCNC: 192 MG/DL (ref 70–99)
GLUCOSE UR STRIP-MCNC: NEGATIVE MG/DL
HCT VFR BLD AUTO: 37 % (ref 35–47)
HGB BLD-MCNC: 11.8 G/DL (ref 11.7–15.7)
HGB UR QL STRIP: NEGATIVE
HOLD SPECIMEN: NORMAL
IMM GRANULOCYTES # BLD: 0.1 10E3/UL
IMM GRANULOCYTES NFR BLD: 1 %
INR PPP: 1.15 (ref 0.85–1.15)
KETONES UR STRIP-MCNC: NEGATIVE MG/DL
LACTATE SERPL-SCNC: 1.3 MMOL/L (ref 0.7–2)
LACTATE SERPL-SCNC: 2.7 MMOL/L (ref 0.7–2)
LEUKOCYTE ESTERASE UR QL STRIP: NEGATIVE
LIPASE SERPL-CCNC: 6 U/L (ref 13–60)
LYMPHOCYTES # BLD AUTO: 0.5 10E3/UL (ref 0.8–5.3)
LYMPHOCYTES NFR BLD AUTO: 5 %
MAGNESIUM SERPL-MCNC: 1.5 MG/DL (ref 1.7–2.3)
MCH RBC QN AUTO: 30.6 PG (ref 26.5–33)
MCHC RBC AUTO-ENTMCNC: 31.9 G/DL (ref 31.5–36.5)
MCV RBC AUTO: 96 FL (ref 78–100)
MONOCYTES # BLD AUTO: 0.1 10E3/UL (ref 0–1.3)
MONOCYTES NFR BLD AUTO: 2 %
MUCOUS THREADS #/AREA URNS LPF: PRESENT /LPF
NEUTROPHILS # BLD AUTO: 8.1 10E3/UL (ref 1.6–8.3)
NEUTROPHILS NFR BLD AUTO: 92 %
NITRATE UR QL: NEGATIVE
NRBC # BLD AUTO: 0 10E3/UL
NRBC BLD AUTO-RTO: 0 /100
PH UR STRIP: 6 [PH] (ref 5–7)
PHOSPHATE SERPL-MCNC: 1.8 MG/DL (ref 2.5–4.5)
PLATELET # BLD AUTO: 164 10E3/UL (ref 150–450)
POTASSIUM SERPL-SCNC: 3.5 MMOL/L (ref 3.4–5.3)
PROCALCITONIN SERPL IA-MCNC: 6.33 NG/ML
PROT SERPL-MCNC: 6.2 G/DL (ref 6.4–8.3)
RBC # BLD AUTO: 3.86 10E6/UL (ref 3.8–5.2)
RBC URINE: 3 /HPF
SODIUM SERPL-SCNC: 140 MMOL/L (ref 136–145)
SP GR UR STRIP: 1.01 (ref 1–1.03)
SQUAMOUS EPITHELIAL: <1 /HPF
TRANSITIONAL EPI: <1 /HPF
TROPONIN T SERPL HS-MCNC: 7 NG/L
TROPONIN T SERPL HS-MCNC: <6 NG/L
UROBILINOGEN UR STRIP-MCNC: NORMAL MG/DL
WBC # BLD AUTO: 8.8 10E3/UL (ref 4–11)
WBC URINE: 1 /HPF

## 2023-06-04 PROCEDURE — 36415 COLL VENOUS BLD VENIPUNCTURE: CPT | Performed by: EMERGENCY MEDICINE

## 2023-06-04 PROCEDURE — 99223 1ST HOSP IP/OBS HIGH 75: CPT | Mod: FS | Performed by: PHYSICIAN ASSISTANT

## 2023-06-04 PROCEDURE — 87484 EHRLICHA CHAFFEENSIS AMP PRB: CPT | Performed by: PHYSICIAN ASSISTANT

## 2023-06-04 PROCEDURE — 99285 EMERGENCY DEPT VISIT HI MDM: CPT | Performed by: EMERGENCY MEDICINE

## 2023-06-04 PROCEDURE — 82977 ASSAY OF GGT: CPT | Performed by: PHYSICIAN ASSISTANT

## 2023-06-04 PROCEDURE — 80053 COMPREHEN METABOLIC PANEL: CPT | Performed by: EMERGENCY MEDICINE

## 2023-06-04 PROCEDURE — 86803 HEPATITIS C AB TEST: CPT | Performed by: PHYSICIAN ASSISTANT

## 2023-06-04 PROCEDURE — 250N000013 HC RX MED GY IP 250 OP 250 PS 637: Performed by: PHYSICIAN ASSISTANT

## 2023-06-04 PROCEDURE — 250N000011 HC RX IP 250 OP 636: Performed by: STUDENT IN AN ORGANIZED HEALTH CARE EDUCATION/TRAINING PROGRAM

## 2023-06-04 PROCEDURE — 86140 C-REACTIVE PROTEIN: CPT | Performed by: PHYSICIAN ASSISTANT

## 2023-06-04 PROCEDURE — 85610 PROTHROMBIN TIME: CPT | Performed by: PHYSICIAN ASSISTANT

## 2023-06-04 PROCEDURE — 258N000003 HC RX IP 258 OP 636: Performed by: EMERGENCY MEDICINE

## 2023-06-04 PROCEDURE — 258N000003 HC RX IP 258 OP 636: Performed by: PHYSICIAN ASSISTANT

## 2023-06-04 PROCEDURE — 83735 ASSAY OF MAGNESIUM: CPT | Performed by: PHYSICIAN ASSISTANT

## 2023-06-04 PROCEDURE — 250N000011 HC RX IP 250 OP 636: Performed by: PHYSICIAN ASSISTANT

## 2023-06-04 PROCEDURE — 71045 X-RAY EXAM CHEST 1 VIEW: CPT

## 2023-06-04 PROCEDURE — 93005 ELECTROCARDIOGRAM TRACING: CPT | Performed by: EMERGENCY MEDICINE

## 2023-06-04 PROCEDURE — 83605 ASSAY OF LACTIC ACID: CPT | Performed by: EMERGENCY MEDICINE

## 2023-06-04 PROCEDURE — 86706 HEP B SURFACE ANTIBODY: CPT | Performed by: PHYSICIAN ASSISTANT

## 2023-06-04 PROCEDURE — 86708 HEPATITIS A ANTIBODY: CPT | Performed by: PHYSICIAN ASSISTANT

## 2023-06-04 PROCEDURE — 86618 LYME DISEASE ANTIBODY: CPT | Performed by: PHYSICIAN ASSISTANT

## 2023-06-04 PROCEDURE — 87798 DETECT AGENT NOS DNA AMP: CPT | Performed by: PHYSICIAN ASSISTANT

## 2023-06-04 PROCEDURE — 250N000009 HC RX 250: Performed by: PHYSICIAN ASSISTANT

## 2023-06-04 PROCEDURE — 36415 COLL VENOUS BLD VENIPUNCTURE: CPT | Performed by: PHYSICIAN ASSISTANT

## 2023-06-04 PROCEDURE — 71045 X-RAY EXAM CHEST 1 VIEW: CPT | Mod: 26 | Performed by: RADIOLOGY

## 2023-06-04 PROCEDURE — 86709 HEPATITIS A IGM ANTIBODY: CPT | Performed by: PHYSICIAN ASSISTANT

## 2023-06-04 PROCEDURE — 96365 THER/PROPH/DIAG IV INF INIT: CPT | Performed by: EMERGENCY MEDICINE

## 2023-06-04 PROCEDURE — 81001 URINALYSIS AUTO W/SCOPE: CPT | Performed by: EMERGENCY MEDICINE

## 2023-06-04 PROCEDURE — 84100 ASSAY OF PHOSPHORUS: CPT | Performed by: PHYSICIAN ASSISTANT

## 2023-06-04 PROCEDURE — 85025 COMPLETE CBC W/AUTO DIFF WBC: CPT | Performed by: EMERGENCY MEDICINE

## 2023-06-04 PROCEDURE — 87149 DNA/RNA DIRECT PROBE: CPT | Performed by: EMERGENCY MEDICINE

## 2023-06-04 PROCEDURE — 87340 HEPATITIS B SURFACE AG IA: CPT | Performed by: PHYSICIAN ASSISTANT

## 2023-06-04 PROCEDURE — 87077 CULTURE AEROBIC IDENTIFY: CPT | Performed by: EMERGENCY MEDICINE

## 2023-06-04 PROCEDURE — 36592 COLLECT BLOOD FROM PICC: CPT | Performed by: PHYSICIAN ASSISTANT

## 2023-06-04 PROCEDURE — 250N000011 HC RX IP 250 OP 636: Performed by: EMERGENCY MEDICINE

## 2023-06-04 PROCEDURE — 99418 PROLNG IP/OBS E/M EA 15 MIN: CPT | Performed by: PHYSICIAN ASSISTANT

## 2023-06-04 PROCEDURE — 96361 HYDRATE IV INFUSION ADD-ON: CPT | Performed by: EMERGENCY MEDICINE

## 2023-06-04 PROCEDURE — 80143 DRUG ASSAY ACETAMINOPHEN: CPT | Performed by: PHYSICIAN ASSISTANT

## 2023-06-04 PROCEDURE — 120N000002 HC R&B MED SURG/OB UMMC

## 2023-06-04 PROCEDURE — 99207 PR APP CREDIT; MD BILLING SHARED VISIT: CPT | Mod: FS | Performed by: STUDENT IN AN ORGANIZED HEALTH CARE EDUCATION/TRAINING PROGRAM

## 2023-06-04 PROCEDURE — 83690 ASSAY OF LIPASE: CPT | Performed by: EMERGENCY MEDICINE

## 2023-06-04 PROCEDURE — 99285 EMERGENCY DEPT VISIT HI MDM: CPT | Mod: 25 | Performed by: EMERGENCY MEDICINE

## 2023-06-04 PROCEDURE — 96375 TX/PRO/DX INJ NEW DRUG ADDON: CPT | Performed by: EMERGENCY MEDICINE

## 2023-06-04 PROCEDURE — 84145 PROCALCITONIN (PCT): CPT | Performed by: PHYSICIAN ASSISTANT

## 2023-06-04 PROCEDURE — 84484 ASSAY OF TROPONIN QUANT: CPT | Performed by: PHYSICIAN ASSISTANT

## 2023-06-04 RX ORDER — HYDROMORPHONE HYDROCHLORIDE 1 MG/ML
0.5 INJECTION, SOLUTION INTRAMUSCULAR; INTRAVENOUS; SUBCUTANEOUS ONCE
Status: COMPLETED | OUTPATIENT
Start: 2023-06-04 | End: 2023-06-04

## 2023-06-04 RX ORDER — DIPHENHYDRAMINE HCL 25 MG
25 CAPSULE ORAL EVERY 6 HOURS PRN
Status: DISCONTINUED | OUTPATIENT
Start: 2023-06-04 | End: 2023-06-05

## 2023-06-04 RX ORDER — HYDROMORPHONE HYDROCHLORIDE 1 MG/ML
0.3 INJECTION, SOLUTION INTRAMUSCULAR; INTRAVENOUS; SUBCUTANEOUS
Status: DISCONTINUED | OUTPATIENT
Start: 2023-06-04 | End: 2023-06-10

## 2023-06-04 RX ORDER — ZINC OXIDE 200 MG/G
71 PASTE TOPICAL
Status: DISCONTINUED | OUTPATIENT
Start: 2023-06-04 | End: 2023-06-10 | Stop reason: HOSPADM

## 2023-06-04 RX ORDER — NICOTINE POLACRILEX 4 MG
15-30 LOZENGE BUCCAL
Status: DISCONTINUED | OUTPATIENT
Start: 2023-06-04 | End: 2023-06-10 | Stop reason: HOSPADM

## 2023-06-04 RX ORDER — NALOXONE HYDROCHLORIDE 0.4 MG/ML
0.2 INJECTION, SOLUTION INTRAMUSCULAR; INTRAVENOUS; SUBCUTANEOUS
Status: DISCONTINUED | OUTPATIENT
Start: 2023-06-04 | End: 2023-06-10 | Stop reason: HOSPADM

## 2023-06-04 RX ORDER — CYCLOBENZAPRINE HCL 5 MG
10 TABLET ORAL
Status: DISCONTINUED | OUTPATIENT
Start: 2023-06-04 | End: 2023-06-10 | Stop reason: HOSPADM

## 2023-06-04 RX ORDER — PROCHLORPERAZINE 25 MG
25 SUPPOSITORY, RECTAL RECTAL EVERY 12 HOURS PRN
Status: DISCONTINUED | OUTPATIENT
Start: 2023-06-04 | End: 2023-06-10 | Stop reason: HOSPADM

## 2023-06-04 RX ORDER — MAGNESIUM SULFATE HEPTAHYDRATE 40 MG/ML
4 INJECTION, SOLUTION INTRAVENOUS ONCE
Status: DISCONTINUED | OUTPATIENT
Start: 2023-06-04 | End: 2023-06-04

## 2023-06-04 RX ORDER — MONTELUKAST SODIUM 10 MG/1
10 TABLET ORAL AT BEDTIME
Status: DISCONTINUED | OUTPATIENT
Start: 2023-06-04 | End: 2023-06-10 | Stop reason: HOSPADM

## 2023-06-04 RX ORDER — MEROPENEM 1 G/1
1 INJECTION, POWDER, FOR SOLUTION INTRAVENOUS EVERY 8 HOURS
Status: DISCONTINUED | OUTPATIENT
Start: 2023-06-04 | End: 2023-06-06

## 2023-06-04 RX ORDER — LEVOTHYROXINE SODIUM 137 UG/1
137 TABLET ORAL
Status: DISCONTINUED | OUTPATIENT
Start: 2023-06-05 | End: 2023-06-10 | Stop reason: HOSPADM

## 2023-06-04 RX ORDER — DEXTROSE MONOHYDRATE 25 G/50ML
25-50 INJECTION, SOLUTION INTRAVENOUS
Status: DISCONTINUED | OUTPATIENT
Start: 2023-06-04 | End: 2023-06-10 | Stop reason: HOSPADM

## 2023-06-04 RX ORDER — POTASSIUM CHLORIDE 20MEQ/15ML
20 LIQUID (ML) ORAL ONCE
Status: COMPLETED | OUTPATIENT
Start: 2023-06-04 | End: 2023-06-04

## 2023-06-04 RX ORDER — AMITRIPTYLINE HYDROCHLORIDE 10 MG/1
40 TABLET ORAL AT BEDTIME
Status: DISCONTINUED | OUTPATIENT
Start: 2023-06-04 | End: 2023-06-10 | Stop reason: HOSPADM

## 2023-06-04 RX ORDER — PROCHLORPERAZINE MALEATE 5 MG
10 TABLET ORAL EVERY 6 HOURS PRN
Status: DISCONTINUED | OUTPATIENT
Start: 2023-06-04 | End: 2023-06-10 | Stop reason: HOSPADM

## 2023-06-04 RX ORDER — POTASSIUM PHOS IN 0.9 % NACL 15MMOL/250
15 PLASTIC BAG, INJECTION (ML) INTRAVENOUS ONCE
Status: COMPLETED | OUTPATIENT
Start: 2023-06-04 | End: 2023-06-05

## 2023-06-04 RX ORDER — NALOXONE HYDROCHLORIDE 0.4 MG/ML
0.4 INJECTION, SOLUTION INTRAMUSCULAR; INTRAVENOUS; SUBCUTANEOUS
Status: DISCONTINUED | OUTPATIENT
Start: 2023-06-04 | End: 2023-06-10 | Stop reason: HOSPADM

## 2023-06-04 RX ORDER — CETIRIZINE HYDROCHLORIDE 10 MG/1
10 TABLET ORAL DAILY
Status: DISCONTINUED | OUTPATIENT
Start: 2023-06-05 | End: 2023-06-10 | Stop reason: HOSPADM

## 2023-06-04 RX ORDER — FAMOTIDINE 40 MG/5ML
20 POWDER, FOR SUSPENSION ORAL AT BEDTIME
Status: DISCONTINUED | OUTPATIENT
Start: 2023-06-04 | End: 2023-06-10 | Stop reason: HOSPADM

## 2023-06-04 RX ORDER — ERTAPENEM 1 G/1
1 INJECTION, POWDER, LYOPHILIZED, FOR SOLUTION INTRAMUSCULAR; INTRAVENOUS ONCE
Status: COMPLETED | OUTPATIENT
Start: 2023-06-04 | End: 2023-06-04

## 2023-06-04 RX ORDER — METHOCARBAMOL 750 MG/1
750 TABLET, FILM COATED ORAL 4 TIMES DAILY PRN
Status: DISCONTINUED | OUTPATIENT
Start: 2023-06-04 | End: 2023-06-10 | Stop reason: HOSPADM

## 2023-06-04 RX ORDER — MAGNESIUM SULFATE HEPTAHYDRATE 40 MG/ML
2 INJECTION, SOLUTION INTRAVENOUS
Status: DISPENSED | OUTPATIENT
Start: 2023-06-04 | End: 2023-06-04

## 2023-06-04 RX ORDER — LIDOCAINE 40 MG/G
CREAM TOPICAL
Status: DISCONTINUED | OUTPATIENT
Start: 2023-06-04 | End: 2023-06-10 | Stop reason: HOSPADM

## 2023-06-04 RX ORDER — SODIUM CHLORIDE, SODIUM LACTATE, POTASSIUM CHLORIDE, CALCIUM CHLORIDE 600; 310; 30; 20 MG/100ML; MG/100ML; MG/100ML; MG/100ML
INJECTION, SOLUTION INTRAVENOUS CONTINUOUS
Status: DISCONTINUED | OUTPATIENT
Start: 2023-06-04 | End: 2023-06-05

## 2023-06-04 RX ORDER — SCOLOPAMINE TRANSDERMAL SYSTEM 1 MG/1
1 PATCH, EXTENDED RELEASE TRANSDERMAL
Status: DISCONTINUED | OUTPATIENT
Start: 2023-06-05 | End: 2023-06-10 | Stop reason: HOSPADM

## 2023-06-04 RX ORDER — ERTAPENEM 1 G/1
1 INJECTION, POWDER, LYOPHILIZED, FOR SOLUTION INTRAMUSCULAR; INTRAVENOUS EVERY 24 HOURS
Status: DISCONTINUED | OUTPATIENT
Start: 2023-06-05 | End: 2023-06-04

## 2023-06-04 RX ORDER — OXYCODONE HCL 5 MG/5 ML
5 SOLUTION, ORAL ORAL EVERY 4 HOURS PRN
Status: DISCONTINUED | OUTPATIENT
Start: 2023-06-04 | End: 2023-06-10 | Stop reason: HOSPADM

## 2023-06-04 RX ADMIN — HYDROMORPHONE HYDROCHLORIDE 0.3 MG: 1 INJECTION, SOLUTION INTRAMUSCULAR; INTRAVENOUS; SUBCUTANEOUS at 23:52

## 2023-06-04 RX ADMIN — PROCHLORPERAZINE EDISYLATE 10 MG: 5 INJECTION INTRAMUSCULAR; INTRAVENOUS at 20:30

## 2023-06-04 RX ADMIN — MONTELUKAST 10 MG: 10 TABLET, FILM COATED ORAL at 22:52

## 2023-06-04 RX ADMIN — MEROPENEM 1 G: 1 INJECTION, POWDER, FOR SOLUTION INTRAVENOUS at 22:52

## 2023-06-04 RX ADMIN — VANCOMYCIN HYDROCHLORIDE 1500 MG: 10 INJECTION, POWDER, LYOPHILIZED, FOR SOLUTION INTRAVENOUS at 16:29

## 2023-06-04 RX ADMIN — AMITRIPTYLINE HYDROCHLORIDE 40 MG: 10 TABLET, FILM COATED ORAL at 22:52

## 2023-06-04 RX ADMIN — POTASSIUM CHLORIDE 20 MEQ: 1.5 SOLUTION ORAL at 20:22

## 2023-06-04 RX ADMIN — MAGNESIUM SULFATE IN WATER 2 G: 40 INJECTION, SOLUTION INTRAVENOUS at 20:22

## 2023-06-04 RX ADMIN — POTASSIUM PHOSPHATE, MONOBASIC POTASSIUM PHOSPHATE, DIBASIC 15 MMOL: 224; 236 INJECTION, SOLUTION, CONCENTRATE INTRAVENOUS at 23:51

## 2023-06-04 RX ADMIN — HYDROMORPHONE HYDROCHLORIDE 0.3 MG: 1 INJECTION, SOLUTION INTRAMUSCULAR; INTRAVENOUS; SUBCUTANEOUS at 20:22

## 2023-06-04 RX ADMIN — SODIUM CHLORIDE, POTASSIUM CHLORIDE, SODIUM LACTATE AND CALCIUM CHLORIDE: 600; 310; 30; 20 INJECTION, SOLUTION INTRAVENOUS at 19:21

## 2023-06-04 RX ADMIN — SODIUM CHLORIDE 1000 ML: 9 INJECTION, SOLUTION INTRAVENOUS at 14:34

## 2023-06-04 RX ADMIN — METHOCARBAMOL 750 MG: 750 TABLET ORAL at 18:23

## 2023-06-04 RX ADMIN — FAMOTIDINE 20 MG: 40 POWDER, FOR SUSPENSION ORAL at 23:52

## 2023-06-04 RX ADMIN — HYDROMORPHONE HYDROCHLORIDE 0.5 MG: 1 INJECTION, SOLUTION INTRAMUSCULAR; INTRAVENOUS; SUBCUTANEOUS at 14:34

## 2023-06-04 RX ADMIN — OXYCODONE HYDROCHLORIDE 5 MG: 5 SOLUTION ORAL at 18:23

## 2023-06-04 RX ADMIN — DIPHENHYDRAMINE HYDROCHLORIDE 25 MG: 25 CAPSULE ORAL at 18:23

## 2023-06-04 RX ADMIN — ERTAPENEM SODIUM 1 G: 1 INJECTION, POWDER, LYOPHILIZED, FOR SOLUTION INTRAMUSCULAR; INTRAVENOUS at 15:54

## 2023-06-04 ASSESSMENT — ACTIVITIES OF DAILY LIVING (ADL)
TOILETING_ISSUES: NO
DRESSING/BATHING_DIFFICULTY: NO
FALL_HISTORY_WITHIN_LAST_SIX_MONTHS: NO
WEAR_GLASSES_OR_BLIND: YES
ADLS_ACUITY_SCORE: 37
ADLS_ACUITY_SCORE: 37
DIFFICULTY_EATING/SWALLOWING: NO
VISION_MANAGEMENT: GLASSES
DOING_ERRANDS_INDEPENDENTLY_DIFFICULTY: NO
CHANGE_IN_FUNCTIONAL_STATUS_SINCE_ONSET_OF_CURRENT_ILLNESS/INJURY: NO
ADLS_ACUITY_SCORE: 37
EQUIPMENT_CURRENTLY_USED_AT_HOME: OTHER (SEE COMMENTS)
CONCENTRATING,_REMEMBERING_OR_MAKING_DECISIONS_DIFFICULTY: NO
WALKING_OR_CLIMBING_STAIRS_DIFFICULTY: NO

## 2023-06-04 NOTE — ED TRIAGE NOTES
Patient presents to triage for evaluation of ongoing fever, nausea, and pain. Patient states she was up all Friday night with fever, chills, nausea. Patient seen at [facility] in [place] yesterday, had blood work and CT which were negative. Patient states she has new onset chest pressure today. Patient took tylenol about an hour PTA. Patient has complex history:  She has PMH of gallstone s/p cholecystectomy (1996), s/p multiple ERCP c/b recurrent pancreatitis, chronic abdominal pain, gastroparesis, chronic nausea and vomiting, chronic malnutrition/FTT on G-tube s/p multiple G/J-tube adjustments/laparotomy s/p TPIT (2016), Hx of GERD, hypothyroidism, uterine tumor s/p hysterectomy, on TPN.      Triage Assessment     Row Name 06/04/23 [id number]       Triage Assessment (Adult)    Airway WDL WDL       Respiratory WDL    Respiratory WDL WDL       Skin Circulation/Temperature WDL    Skin Circulation/Temperature WDL WDL       Cardiac WDL    Cardiac WDL WDL       Peripheral/Neurovascular WDL    Peripheral Neurovascular WDL WDL       Cognitive/Neuro/Behavioral WDL    Cognitive/Neuro/Behavioral WDL WDL

## 2023-06-04 NOTE — ED PROVIDER NOTES
[place] EMERGENCY DEPARTMENT ([facility])    6/04/23       ED PROVIDER NOTE        History     Chief Complaint   Patient presents with     Nausea     Pain     Fever     The history is provided by the patient and medical records.     Dinora Mcghee is a 58 year old female with a complex medical history of gallstone s/p cholecystectomy (1996), s/p multiple ERCP c/b recurrent pancreatitis, chronic abdominal pain, gastroparesis, chronic nausea and vomiting, chronic malnutrition/FTT on G-tube s/p multiple G/J-tube adjustments/laparotomy s/p TPIT (2016), Hx of GERD, hypothyroidism, uterine tumor s/p hysterectomy, on TPN. Patient presents to the ED today with her  for evaluation of pain, headache, fever, nausea, vomiting, and diarrhea since Friday 6/2/23. She says her pain is worst in her back, chest, legs and joints. Patient reports vomiting once today, and 4 times yesterday. Patient says she had 8 episodes of diarrhea thus far today. She says her fever was 100 today, and she has a fever for the past 3 days. She took migraine medicine today at 9AM and 11AM but says they are not helping. She had ICV phenergan for her nausea. Per chart review, patient was seen at [facility] yesterday 6/3 for  abdominal pain at which time no specific etiology for her intense abdominal pain was elicited, she was diagnosed with a viral syndrome and she was advised to continue with her home daily pain and nausea regimen, and follow up with her care team here. A CT Chest Abdomen Pelvis was done yesterday 6/3 at [facility] and its findings were negative. She is a patient of Dr. Mandeep Park.     Social: Accompanied by .        CT Chest Abdomen Pelvis Angiogram with IV Contrast (06/03/2023 1:00 PM CDT) [facility]      Impressions   06/03/2023 1:37 PM CDT      1. No acute aortic syndrome. Normal caliber aorta. Major visceral branches are patent and normal in  caliber.  2. No acute  finding in the chest, abdomen, or pelvis. Incidental findings are described in the body  of the report.           Past Medical History  Past Medical History:   Diagnosis Date     Allergic rhinitis, cause unspecified      Allergy to other foods     strawberries, apples, celeries, alice, watermelon     Arthritis     left knee     Choledocholithiasis     long after cholecystectomy     Chronic abdominal pain      Chronic constipation      Chronic nausea      Chronic pancreatitis (H)      Degeneration of lumbar or lumbosacral intervertebral disc      Esophageal reflux     w/ hiatal hernia     Gastroparesis      Hiatal hernia      History of pituitary adenoma     s/p resection     Hypothyroidism      Migraines      Mild hyperlipidemia      On tube feeding diet     presence of GJ tube     Pancreatic disease     PD stricture, 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 endometriosis growth     APPENDECTOMY        SECTION       COLONOSCOPY       ENDOSCOPIC INSERTION TUBE GASTROSTOMY N/A 2021    Procedure: Gastrojejonostomy placement with jejunopexy, PEG tube exchange;  Surgeon: Zackery Montoya MD;  [facility]     ENDOSCOPIC RETROGRADE CHOLANGIOPANCREATOGRAM N/A 2015    Procedure: ENDOSCOPIC RETROGRADE CHOLANGIOPANCREATOGRAM;  Surgeon: Mandeep Park MD;  [facility]     ENDOSCOPIC RETROGRADE CHOLANGIOPANCREATOGRAM N/A 2016    Procedure: COMBINED ENDOSCOPIC RETROGRADE CHOLANGIOPANCREATOGRAPHY, PLACE TUBE/STENT;  Surgeon: Mandeep Park MD;  [facility]     ENDOSCOPIC RETROGRADE CHOLANGIOPANCREATOGRAM N/A 3/17/2016    Procedure: COMBINED ENDOSCOPIC RETROGRADE  CHOLANGIOPANCREATOGRAPHY, REMOVE FOREIGN BODY OR STENT/TUBE;  Surgeon: Mandeep Park MD;  [facility]     ENDOSCOPIC RETROGRADE CHOLANGIOPANCREATOGRAM N/A 8/2/2016    Procedure: COMBINED ENDOSCOPIC RETROGRADE CHOLANGIOPANCREATOGRAPHY, PLACE TUBE/STENT;  Surgeon: Mandeep Park MD;  [facility]     ENDOSCOPIC RETROGRADE CHOLANGIOPANCREATOGRAM N/A 8/26/2016    Procedure: COMBINED ENDOSCOPIC RETROGRADE CHOLANGIOPANCREATOGRAPHY, REMOVE FOREIGN BODY OR STENT/TUBE;  Surgeon: Mandeep Park MD;  [facility]     ENDOSCOPIC ULTRASOUND UPPER GASTROINTESTINAL TRACT (GI) N/A 10/3/2016    Procedure: ENDOSCOPIC ULTRASOUND, ESOPHAGOSCOPY / UPPER GASTROINTESTINAL TRACT (GI);  Surgeon: Guru Jose Rodas MD;  [facility]     ENTEROSCOPY SMALL BOWEL N/A 2/3/2022    Procedure: ENTEROSCOPY with possible fistula closure;  Surgeon: Francisco Dodson MD;  Location:  GI     ESOPHAGOSCOPY, GASTROSCOPY, DUODENOSCOPY (EGD), COMBINED N/A 6/24/2015    Procedure: COMBINED ESOPHAGOSCOPY, GASTROSCOPY, DUODENOSCOPY (EGD), REMOVE FOREIGN BODY;  Surgeon: Mandeep Park MD;  [facility]     ESOPHAGOSCOPY, GASTROSCOPY, DUODENOSCOPY (EGD), COMBINED N/A 10/25/2015    Procedure: COMBINED ESOPHAGOSCOPY, GASTROSCOPY, DUODENOSCOPY (EGD);  Surgeon: Sammy Amaro MD;  [facility]     ESOPHAGOSCOPY, GASTROSCOPY, DUODENOSCOPY (EGD), COMBINED N/A 10/25/2015    Procedure: COMBINED ESOPHAGOSCOPY, GASTROSCOPY, DUODENOSCOPY (EGD), BIOPSY SINGLE OR MULTIPLE;  Surgeon: Sammy Amaro MD;  [facility]     ESOPHAGOSCOPY, GASTROSCOPY, DUODENOSCOPY (EGD), COMBINED N/A 1/31/2023    Procedure: ESOPHAGOGASTRODUODENOSCOPY (EGD) with peg replacement ;  Surgeon: Mandeep Park MD;  [facility]     ESOPHAGOSCOPY, GASTROSCOPY, DUODENOSCOPY (EGD), DILATATION, COMBINED       EXCISE LESION TRUNK N/A 4/17/2017    Procedure: EXCISE LESION TRUNK;  Removal of Abdominal Foreign Body;   Surgeon: Nestor Phoenix MD;  [facility]     HC ESOPH/GAS REFLUX TEST W NASAL IMPED >1 HR N/A 11/19/2015    Procedure: ESOPHAGEAL IMPEDENCE FUNCTION TEST WITH 24 HOUR PH GREATER THAN 1 HOUR;  Surgeon: Thiago Apple MD;  [facility]     HC UGI ENDOSCOPY DIAG W BIOPSY  9/17/08     HC UGI ENDOSCOPY DIAG W BIOPSY  9/27/12     HC UGI ENDOSCOPY W ESOPHAGEAL DILATION BALLOON <30MM  9/17/08     HC UGI ENDOSCOPY W EUS N/A 5/5/2015    Procedure: COMBINED ENDOSCOPIC ULTRASOUND, ESOPHAGOSCOPY, GASTROSCOPY, DUODENOSCOPY (EGD);  Surgeon: Wm Dueñas MD;  [facility]     HC WRIST ARTHROSCOP,RELEASE XVERS LIG Bilateral 12/17/08     INJECT TRANSVERSUS ABDOMINIS PLANE (TAP) BLOCK BILATERAL Left 9/22/2016    Procedure: INJECT TRANSVERSUS ABDOMINIS PLANE (TAP) BLOCK BILATERAL;  Surgeon: Dickson Corrigan MD;  [facility]     INJECT TRIGGER POINT Bilateral 9/8/2022    Procedure: Abdominal trigger point injection with ultrasound;  Surgeon: Monika Mahajan MD;  [facility]     INJECT TRIGGER POINT SINGLE / MULTIPLE 3 OR MORE MUSCLES Right 11/15/2022    Procedure: Trigger point injections right abdomen with ultrasound guidance;  Surgeon: Monika Mahajan MD;  [facility]: [facility]     IR CHEST PORT PLACEMENT < 5 YRS OF AGE  6/10/2022     laparoscopic pineda  1995     LAPAROSCOPIC HERNIORRHAPHY INCISIONAL N/A 8/23/2018    Procedure: LAPAROSCOPIC HERNIORRHAPHY INCISIONAL;  Laparoscopic Incisional Hernia Repair with Symbotex Mesh Implant;  Surgeon: Nestor Phoenix MD;  [facility]     LAPAROSCOPIC PANCREATECTOMY, TRANSPLANT AUTO ISLET CELL N/A 12/28/2016    Procedure: LAPAROSCOPIC PANCREATECTOMY, TRANSPLANT AUTO ISLET CELL;  Surgeon: Nestor Phoenix MD;  [facility]     LAPAROTOMY EXPLORATORY N/A 1/31/2023    Procedure: Exploratory Laparotomy, lysis of adhesions, Perforated J-Junostomy Resection, Replace J-Junostomy site;  Surgeon: Elver Bauer MD;  [facility]     LAPAROTOMY, LYSIS  ADHESIONS, COMBINED N/A 1/31/2023    Procedure: Dilatation of jejunostomy feeding tube, track with placement of jejunostomy tube with fluoroscopy;  Surgeon: Elver Bauer MD;  [facility]     REMOVE AND REPLACE BREAST IMPLANT PROSTHESIS N/A 12/30/2022    Procedure: Exploratory Laparotomy, lysis of adhesions, small bowel resection. Placement of gastric jejunostomy for enteral feeding.;  Surgeon: Elver Bauer MD;  [facility]     REMOVE GASTROSTOMY TUBE ADULT N/A 1/28/2022    Procedure: REMOVAL, GASTROSTOMY TUBE, ADULT;  Surgeon: Mandeep Park MD;  [facility]     REPLACE GASTROJEJUNOSTOMY TUBE, PERCUTANEOUS N/A 9/7/2021    Procedure: GASTROJEJUNOSTOMY TUBE PLACEMENT, PERCUTANEOUS, WITH GASTROPEXY;  Surgeon: Mandeep Park MD;  [facility]     REPLACE GASTROJEJUNOSTOMY TUBE, PERCUTANEOUS N/A 9/22/2021    Procedure: REPLACEMENT, GASTROJEJUNOSTOMY TUBE, PERCUTANEOUS;  Surgeon: Zackery Montoya MD;  [facility]     REPLACE GASTROJEJUNOSTOMY TUBE, PERCUTANEOUS N/A 11/22/2022    Procedure: REPLACEMENT, GASTROJEJUNOSTOMY TUBE, PERCUTANEOUS;  Surgeon: Mandeep Park MD;  [facility]     REPLACE GASTROJEJUNOSTOMY TUBE, PERCUTANEOUS N/A 12/9/2022    Procedure: REPLACEMENT, GASTROJEJUNOSTOMY TUBE, PERCUTANEOUS with ENDOSCOPIC SUTURING.;  Surgeon: Mandeep Park MD;  [facility]     REPLACE JEJUNOSTOMY TUBE, PERCUTANEOUS N/A 9/10/2021    Procedure: UPPER ENDOSCOPY, REPLACEMENT OF PERCUTANEOUS GASTROJEJUNOSTOMY TUBE, T-TAG GASTROPEXY;  Surgeon: Zackery Montoya MD;  [facility]     REPLACE JEJUNOSTOMY TUBE, PERCUTANEOUS N/A 12/29/2021    Procedure: REPLACEMENT, JEJUNOSTOMY TUBE, PERCUTANEOUS;  Surgeon: Mandeep Park MD;  [facility]     REPLACE JEJUNOSTOMY TUBE, PERCUTANEOUS N/A 5/4/2023    Procedure: REPLACEMENT, JEJUNOSTOMY TUBE, PERCUTANEOUS;  Surgeon: Mandeep Park MD;  [facility]     transphenoidal  "pituitary resection       [facility]  DELIVERY ONLY       [facility]  DELIVERY ONLY      repeat c section with incidental cystotomy with repair     [facility] EXCIS PITUITARY,TRANSNASAL/SEPTAL      pituitary tumor removed for diabetes insipidus     [facility] TOTAL ABDOM HYSTERECTOMY      w/ bilateral salpingoophorectomy      acetaminophen (TYLENOL) 325 MG/10.15ML solution  amitriptyline (ELAVIL) 10 MG tablet  amitriptyline (ELAVIL) 10 MG tablet  amylase-lipase-protease (CREON) 90694-97079 units CPEP per EC capsule  blood glucose (PAT CONTOUR NEXT) test strip  blood glucose monitoring (PAT MICROLET) lancets  cetirizine (ZYRTEC) 10 MG tablet  Continuous Blood Gluc Sensor (FREESTYLE LISA 2 SENSOR) MISC  Continuous Blood Gluc Sensor (FREESTYLE LISA 3 SENSOR) MISC  cyclobenzaprine (FLEXERIL) 10 MG tablet  diphenhydrAMINE (BENADRYL ALLERGY) 25 MG capsule  estradiol (VAGIFEM) 10 MCG TABS vaginal tablet  famotidine (PEPCID) 40 MG/5ML suspension  glucagon 1 MG kit  glucose 40 % GEL gel  ibuprofen (ADVIL/MOTRIN) 400 MG tablet  insulin syringe-needle U-100 (30G X 1/2\" 0.3 ML) 30G X 1/2\" 0.3 ML miscellaneous  ketorolac (TORADOL) 10 MG tablet  LACTATED RINGERS IV  levothyroxine (SYNTHROID/LEVOTHROID) 137 MCG tablet  melatonin 1 MG TABS tablet  methocarbamol (ROBAXIN) 750 MG tablet  montelukast (SINGULAIR) 10 MG tablet  Nutritional Supplements (BOOST HIGH PROTEIN) LIQD  pantoprazole (PROTONIX) 40 mg IV push injection  parenteral nutrition - PTA/DISCHARGE ORDER  promethazine-phenylephrine (PROMETHAZINE VC) 6.25-5 MG/5ML syrup  prucalopride succinate (MOTEGRITY) 2 MG tablet  scopolamine (TRANSDERM) 1 MG/3DAYS 72 hr patch  sennosides (SENOKOT) 8.8 MG/5ML syrup  Sharps Container (BD SHARPS ) MISC  STATIN NOT PRESCRIBED (INTENTIONAL)  thiamine (B-1) 100 MG tablet  zinc oxide - white petrolatum (CRITIC-AID THICK MOIST BARRIER) 20-51% PSTE topical paste  ZOLMitriptan (ZOMIG-ZMT) 5 MG ODT      Allergies   Allergen " Reactions     Apple Juice Anaphylaxis     Corticosteroids Other (See Comments)     All oral, IV and injectable steroids are contraindicated (unless in life threatening situations) in [facility] transplant recipients. They can cause irreversible loss of islet cell function. Please contact patient's transplant care coordinator ADI Gaffney RN at [phone]/pager [phone] and/or endocrinologist prior to administration.       Depakote [Divalproex Sodium] Other (See Comments)     Chest pain     Zithromax [Azithromycin Dihydrate] Anaphylaxis     Noted in 4/7/08 ER     Bromfenac      Other reaction(s): Headache     Codeine      Other reaction(s): Hallucinations     Darvocet [Propoxyphene N-Apap] Itching     Morphine Nausea and Vomiting and Rash     Nalbuphine Hcl Rash     RASH :nubaine     Zosyn [Piperacillin-Tazobactam In Dex] Rash     Possible allergy, did have a diffuse rash that seemed drug related but could have also been related to soap in the hospital.      Bactrim [Sulfamethoxazole W-Trimethoprim] Other (See Comments) and Nausea and Vomiting     Severely low liver function.     Statins Other (See Comments)     Previous liver issues, risks vs benefits felt to not warrant statin.  Discussed Oct 2022 visit     Tape [Adhesive Tape] Blisters     Tramadol      Zofran [Ondansetron] Other (See Comments)     migraine     Flagyl [Metronidazole] Hives and Rash     Family History  Family History   Problem Relation Age of Onset     Lipids Mother      Hypertension Mother      Thyroid Disease Mother      Depression Mother      Angina Mother      GERD Mother      Skin Cancer Mother      Migraines Mother      Autoimmune Disease Mother      Hyperlipidemia Mother      Mental Illness Mother      Eye Disorder Father         cataract, detached retina     Myocardial Infarction Father 60     Lipids Father      Cerebrovascular Disease Father      Depression Father      Substance Abuse Father      Anesthesia Reaction Father          stroke right after surgery     Cataracts Father      Osteoarthritis Father      Ulcerative Colitis Father      Autoimmune Disease Father      Heart Disease Father      Hyperlipidemia Father      Mental Illness Father      Interpersonal Violence Sister      Coronary Artery Disease Sister         angioplasty     GERD Sister      Substance Abuse Sister      Depression Sister      Thyroid Disease Sister      Eye Disorder Maternal Grandmother         cataract     Thyroid Disease Maternal Grandmother      Diabetes Maternal Grandfather      Eye Disorder Paternal Grandmother         cataract     Diabetes Paternal Grandmother      Eye Disorder Paternal Grandfather         cataract     Diabetes Paternal Grandfather      Substance Abuse Paternal Grandfather      Eye Disorder Son         ptosis     Depression Son      Anxiety Disorder Son      Heart Disease Paternal Aunt      Diabetes Paternal Aunt      Diabetes Paternal Uncle      Heart Disease Paternal Uncle      Depression Nephew      Anxiety Disorder Nephew      Thyroid Disease Nephew      Diabetes Type 2  Cousin         paternal cousin     Autoimmune Disease Cousin      Autoimmune Disease Sister      Depression Sister      Mental Illness Sister      Substance Abuse Sister      Thyroid Disease Sister      Depression Son      Mental Illness Son      Thyroid Disease Nephew      Social History   Social History     Tobacco Use     Smoking status: Former     Packs/day: 0.50     Years: 6.00     Pack years: 3.00     Types: Cigarettes     Start date: 1985     Quit date: 1992     Years since quittin.4     Tobacco comments:     no 2nd hand   Substance Use Topics     Alcohol use: Not Currently     Alcohol/week: 3.0 - 6.0 standard drinks of alcohol     Types: 1 - 2 Glasses of wine, 1 - 2 Cans of beer, 1 - 2 Shots of liquor per week     Comment: none since IGG     Drug use: No         A medically appropriate review of systems was performed with pertinent positives and  "negatives noted in the HPI, and all other systems negative.    Physical Exam   BP: 129/71  Pulse: (!) 123  Temp: 100.3  F (37.9  C)  Resp: 20  Height: 172.7 cm (5' 8\")  Weight: 61.2 kg (135 lb)  SpO2: 94 %  Physical Exam  Vitals and nursing note reviewed.     Physical Exam   Constitutional:   well nourished, well developed, resting comfortably   HENT:   Head: Normocephalic and atraumatic.   Eyes: Conjunctivae are normal. Pupils are equal, round, and reactive to light.    pharynx has no erythema or exudate, mucous membranes are moist  Neck:   no adenopathy, no bony tenderness  Chest:  Port in right chest  Cardiovascular: regular rate and rhythm without murmurs or gallops  Pulmonary/Chest: Clear to auscultation bilaterally, with no wheezes or retractions. No respiratory distress.  GI: Soft with good bowel sounds.  Diffusely tender, non-distended, with no guarding, no rebound, no peritoneal signs.   Back:  No bony or CVA tenderness   Musculoskeletal:  no edema  Skin: Skin is warm and dry. No rash noted.   Neurological: alert and oriented to person, place, and time. Nonfocal exam  Psychiatric:  normal mood and affect.     ED Course, Procedures, & Data     2:30 PM  The patient was seen and examined by Jo Ann Morton MD in Room ED23.    Procedures                     Results for orders placed or performed during the hospital encounter of 06/04/23   [place] Draw     Status: None (In process)    Narrative    The following orders were created for panel order [place] Draw.  Procedure                               Abnormality         Status                     ---------                               -----------         ------                     Extra Blue Top Tube[[id number]]                              Final result               Extra Red Top Tube[[id number]]                               Final result               Extra Green Top (Lithium...[[id number]]                                                 Extra Purple Top " Tube[[id number]]                            Final result               Extra Green Top (Lithium...[[id number]]                      Final result                 Please view results for these tests on the individual orders.   Extra Blue Top Tube     Status: None   Result Value Ref Range    Hold Specimen JIC    Extra Red Top Tube     Status: None   Result Value Ref Range    Hold Specimen JIC    Extra Purple Top Tube     Status: None   Result Value Ref Range    Hold Specimen JIC    Extra Green Top (Lithium Heparin) ON ICE     Status: None   Result Value Ref Range    Hold Specimen JIC    Lactic acid whole blood     Status: Abnormal   Result Value Ref Range    Lactic Acid 2.7 (H) 0.7 - 2.0 mmol/L   Comprehensive metabolic panel     Status: Abnormal   Result Value Ref Range    Sodium 140 136 - 145 mmol/L    Potassium 3.5 3.4 - 5.3 mmol/L    Chloride 105 98 - 107 mmol/L    Carbon Dioxide (CO2) 21 (L) 22 - 29 mmol/L    Anion Gap 14 7 - 15 mmol/L    Urea Nitrogen 11.0 6.0 - 20.0 mg/dL    Creatinine 0.50 (L) 0.51 - 0.95 mg/dL    Calcium 8.9 8.6 - 10.0 mg/dL    Glucose 192 (H) 70 - 99 mg/dL    Alkaline Phosphatase 281 (H) 35 - 104 U/L     (H) 10 - 35 U/L     (H) 10 - 35 U/L    Protein Total 6.2 (L) 6.4 - 8.3 g/dL    Albumin 3.6 3.5 - 5.2 g/dL    Bilirubin Total 0.7 <=1.2 mg/dL    GFR Estimate >90 >60 mL/min/1.73m2   Lipase     Status: Abnormal   Result Value Ref Range    Lipase 6 (L) 13 - 60 U/L   UA with Microscopic reflex to Culture     Status: Abnormal    Specimen: Urine, Midstream   Result Value Ref Range    Color Urine Yellow Colorless, Straw, Light Yellow, Yellow    Appearance Urine Clear Clear    Glucose Urine Negative Negative mg/dL    Bilirubin Urine Negative Negative    Ketones Urine Negative Negative mg/dL    Specific Gravity Urine 1.015 1.003 - 1.035    Blood Urine Negative Negative    pH Urine 6.0 5.0 - 7.0    Protein Albumin Urine Negative Negative mg/dL    Urobilinogen Urine Normal Normal, 2.0 mg/dL     Nitrite Urine Negative Negative    Leukocyte Esterase Urine Negative Negative    Bacteria Urine Few (A) None Seen /HPF    Mucus Urine Present (A) None Seen /LPF    RBC Urine 3 (H) <=2 /HPF    WBC Urine 1 <=5 /HPF    Squamous Epithelials Urine <1 <=1 /HPF    Transitional Epithelials Urine <1 <=1 /HPF    Narrative    Urine Culture not indicated   Lactic acid whole blood     Status: Normal   Result Value Ref Range    Lactic Acid 1.3 0.7 - 2.0 mmol/L   CBC with platelets and differential     Status: Abnormal   Result Value Ref Range    WBC Count 8.8 4.0 - 11.0 10e3/uL    RBC Count 3.86 3.80 - 5.20 10e6/uL    Hemoglobin 11.8 11.7 - 15.7 g/dL    Hematocrit 37.0 35.0 - 47.0 %    MCV 96 78 - 100 fL    MCH 30.6 26.5 - 33.0 pg    MCHC 31.9 31.5 - 36.5 g/dL    RDW 14.4 10.0 - 15.0 %    Platelet Count 164 150 - 450 10e3/uL    % Neutrophils 92 %    % Lymphocytes 5 %    % Monocytes 2 %    % Eosinophils 0 %    % Basophils 0 %    % Immature Granulocytes 1 %    NRBCs per 100 WBC 0 <1 /100    Absolute Neutrophils 8.1 1.6 - 8.3 10e3/uL    Absolute Lymphocytes 0.5 (L) 0.8 - 5.3 10e3/uL    Absolute Monocytes 0.1 0.0 - 1.3 10e3/uL    Absolute Eosinophils 0.0 0.0 - 0.7 10e3/uL    Absolute Basophils 0.0 0.0 - 0.2 10e3/uL    Absolute Immature Granulocytes 0.1 <=0.4 10e3/uL    Absolute NRBCs 0.0 10e3/uL   EKG 12-lead, tracing only     Status: None (Preliminary result)   Result Value Ref Range    Systolic Blood Pressure  mmHg    Diastolic Blood Pressure  mmHg    Ventricular Rate 112 BPM    Atrial Rate 112 BPM    TX Interval 148 ms    QRS Duration 82 ms     ms    QTc 401 ms    P Axis 52 degrees    R AXIS 2 degrees    T Axis 41 degrees    Interpretation ECG       Sinus tachycardia  Possible Anterior infarct , age undetermined  Abnormal ECG     CBC with platelets differential     Status: Abnormal    Narrative    The following orders were created for panel order CBC with platelets differential.  Procedure                                Abnormality         Status                     ---------                               -----------         ------                     CBC with platelets and d...[[id number]]  Abnormal            Final result                 Please view results for these tests on the individual orders.     Medications   vancomycin (VANCOCIN) 1,500 mg in 0.9% NaCl 250 mL intermittent infusion (1,500 mg Intravenous $New Bag 6/4/23 1629)   vancomycin place tucker - receiving intermittent dosing (has no administration in time range)   HYDROmorphone (PF) (DILAUDID) injection 0.5 mg (0.5 mg Intravenous $Given 6/4/23 1434)   0.9% sodium chloride BOLUS (0 mLs Intravenous Stopped 6/4/23 1554)   ertapenem (INVanz) 1 g vial to attach to  mL bag (0 g Intravenous Stopped 6/4/23 1629)     Labs Ordered and Resulted from Time of ED Arrival to Time of ED Departure   LACTIC ACID WHOLE BLOOD - Abnormal       Result Value    Lactic Acid 2.7 (*)    COMPREHENSIVE METABOLIC PANEL - Abnormal    Sodium 140      Potassium 3.5      Chloride 105      Carbon Dioxide (CO2) 21 (*)     Anion Gap 14      Urea Nitrogen 11.0      Creatinine 0.50 (*)     Calcium 8.9      Glucose 192 (*)     Alkaline Phosphatase 281 (*)      (*)      (*)     Protein Total 6.2 (*)     Albumin 3.6      Bilirubin Total 0.7      GFR Estimate >90     LIPASE - Abnormal    Lipase 6 (*)    ROUTINE UA WITH MICROSCOPIC REFLEX TO CULTURE - Abnormal    Color Urine Yellow      Appearance Urine Clear      Glucose Urine Negative      Bilirubin Urine Negative      Ketones Urine Negative      Specific Gravity Urine 1.015      Blood Urine Negative      pH Urine 6.0      Protein Albumin Urine Negative      Urobilinogen Urine Normal      Nitrite Urine Negative      Leukocyte Esterase Urine Negative      Bacteria Urine Few (*)     Mucus Urine Present (*)     RBC Urine 3 (*)     WBC Urine 1      Squamous Epithelials Urine <1      Transitional Epithelials Urine <1     CBC WITH PLATELETS AND  DIFFERENTIAL - Abnormal    WBC Count 8.8      RBC Count 3.86      Hemoglobin 11.8      Hematocrit 37.0      MCV 96      MCH 30.6      MCHC 31.9      RDW 14.4      Platelet Count 164      % Neutrophils 92      % Lymphocytes 5      % Monocytes 2      % Eosinophils 0      % Basophils 0      % Immature Granulocytes 1      NRBCs per 100 WBC 0      Absolute Neutrophils 8.1      Absolute Lymphocytes 0.5 (*)     Absolute Monocytes 0.1      Absolute Eosinophils 0.0      Absolute Basophils 0.0      Absolute Immature Granulocytes 0.1      Absolute NRBCs 0.0     LACTIC ACID WHOLE BLOOD - Normal    Lactic Acid 1.3     BLOOD CULTURE   BLOOD CULTURE     No orders to display             Critical care was not performed.     Medical Decision Making  The patient's presentation was of high complexity (a chronic illness severe exacerbation, progression, or side effect of treatment).    The patient's evaluation involved:  review of external note(s) from 3+ sources (prior notes and visits)  ordering and/or review of 3+ test(s) in this encounter (see separate area of note for details)  review of 3+ test result(s) ordered prior to this encounter (prior labs and radiology studies)  discussion of management or test interpretation with another health professional (Dr. Park, )    The patient's management necessitated moderate risk (prescription drug management including medications given in the ED) and high risk (a decision regarding hospitalization).      Assessment & Plan          I have reviewed the nursing notes.     Emergency Department course:  The patient was seen and examined at 1425 pm.   She is febrile and tachycardic here.  I treated her with a normal saline bolus IV as well as Dilaudid IV.  Initial lactate today was 2.7.  Blood cultures x2, including 1 from her port, were obtained and I treated her with ertapenem and vancomycin IV  Chart review shows that the patient had a CT of the chest abdomen and pelvis done with IV  contrast yesterday [facility].  This showed 1. No acute aortic syndrome. Normal caliber aorta. Major visceral branches are patent and normal in  caliber.  2. No acute finding in the chest, abdomen, or pelvis. Incidental findings are described in the body  of the report.    Laboratory studies today are notable for an elevated lactate of 2.7  I spoke with the patient's GI physician Dr. Park regarding this patient.  Dr. Park is very familiar with this patient.  He is concerned that her symptoms are secondary to a possible line infection.  The patient is both on TPN and tube feeds at baseline.    Laboratory studies today show an unremarkable CBC, with a WBC of 8.8.  Comprehensive metabolic panel show elevated LFTs, with an AST of 305 and an ALT of 435.  Alkaline phosphatase is elevated at 281.  She is hyperglycemic with a glucose of 192.  Lipase is low at 1.3.  UA is nitrite and LCE negative.  Repeat lactate at 1553 pm has normalized at 1.3.     Dinora Mcghee is a 58 year old female with a complicated medical history of chronic abdominal pain, gastroparesis, ERCPs, recurrent pancreatitis, who is on TPN with tube feedings who presents with diffuse pain in her body, and fevers as well as vomiting.  The concern is for a possible line infection.  She has received IV ertapenem and IV vancomycin.  She will be admitted to the medicine service for further evaluation and treatment. I spoke with Dr. Shields of medicine regarding admission.     I have reviewed the findings, diagnosis, plan and need for follow up with the patient.    New Prescriptions    No medications on file       Final diagnoses:   Fever, unspecified fever cause - concern for line infection   Abdominal pain, generalized   On total parenteral nutrition (TPN)   Chronic pancreatitis, unspecified pancreatitis type (H)   Marcelino STANLEY am serving as a trained medical scribe to document services personally performed by oJ Ann Morton MD, based  on the provider's statements to me.      I, Jo Ann Morton MD, was physically present and have reviewed and verified the accuracy of this note documented by Marcelino Thomas.   This note was created in part by the use of Dragon voice recognition dictation system. Inadvertent grammatical errors and typographical errors may still exist.  MD Jo Ann Olsen MD  [facility] EMERGENCY DEPARTMENT  6/4/2023     Jo Ann Morton MD  06/04/23 [id number]

## 2023-06-04 NOTE — LETTER
[facility] [address] [address]  [phone]    FACSIMILE TRANSMITTAL SHEET    TO: [employer]  COMPANY: [facility]   FAX NUMBER: [phone]  PHONE NUMBER: [phone]     FROM: NI Diez  PHONE: [phone]  DATE: 06/10/23  NUMBER OF PAGES: n/a    _____URGENT __x_REVIEW ONLY _____PLEASE COMMENT____PLEASE REPLY    NOTES/COMMENTS: CORINNE Mcghee ([birth year])                                      IF YOU DID NOT RECEIVE THE CORRECT NUMBER OF PAGES OR THE FAX DID NOT COME THROUGH CLEARLY, PLEASE CALL THE SENDER     CONFIDENTIALITY STATEMENT: Confidential information that may accompany this transmission contains protected health information under state and federal law and is legally privileged. This information is intended only for the use of the individual or entity named above and may be used only for carrying out treatment, payment or other healthcare operations. The recipient or person responsible for delivering this information is prohibited by law from disclosing this information without proper authorization to any other party, unless required to do so by law or regulation. If you are not the intended recipient, you are hereby notified that any review, dissemination, distribution, or copying of this message is strictly prohibited. If you have received this communication in error, please destroy the materials and contact us immediately by calling the number listed above. No response indicates that the information was received by the appropriate authorized party

## 2023-06-04 NOTE — LETTER
Transition Communication Hand-off for Care Transitions to Next Level of Care Provider    Name: Dinora Mcghee  : 1965  MRN #: [id number]  Primary Care Provider: Juan Jose Gomez     Primary Clinic: [address] [place] [id number]     Reason for Hospitalization:  Abdominal pain, generalized [R10.84]  On total parenteral nutrition (TPN) [Z78.9]  Fever, unspecified fever cause [R50.9]  Chronic pancreatitis, unspecified pancreatitis type (H) [K86.1]  Admit Date/Time: 2023  1:40 PM  Discharge Date: 6/10/23  Payor Source: Payor: BCBS / Plan: [employer] [place] / Product Type: Indemnity /     Readmission Assessment Measure (MJ) Risk Score/category: 27%    Reason for Communication Hand-off Referral: Avoidable readmission within 30 days    Discharge Plan: Home with family; Resumed TF and IVF - TPN holiday    Concern for non-adherence with plan of care:   Y/N no  Discharge Needs Assessment:  Needs      Flowsheet Row Most Recent Value   Equipment Currently Used at Home other (see comments)  [TF, TPN, Gtube and Jtube supplies]            Already enrolled in Tele-monitoring program and name of program:  no  Follow-up specialty is recommended: Yes    Follow-up plan:    Future Appointments   Date Time Provider Department Center   2023  9:00 AM Juan Jose Gomez MD [facility]   2023  9:10 AM Rachelle العلي APRN [facility]   2023  2:30 PM [facility]   2023  9:00 AM Izabel Becker APRN [facility]   2023 12:30 PM Sweta Ghosh [facility] [place]   2023  2:30 PM Mandeep Park MD [facility]   2023  9:20 AM Vijaya Genao MD [facility]       Any outstanding tests or procedures:        Referrals       Future Labs/Procedures    OPAT enrollment and ID Clinic Referral     Comments:    You are being prescribed a strong antibiotic treatment for an infection. This treatment requires close  monitoring, and you have been recommended to follow up with a specialist in the Infectious Diseases Clinic within 2 weeks of your hospital discharge.   Our team of Infectious Diseases specialists looks forward to seeing you in clinic. A representative will call you within 3 business days to help schedule your appointment. If you do not receive a call to schedule, or if you have any questions about or problems with your clinical care, please contact us by phone at [phone].                Iraheta Recommendations:      Edi Barger RN    AVS/Discharge Summary is the source of truth; this is a helpful guide for improved communication of patient story

## 2023-06-04 NOTE — H&P
[facility]    History and Physical - Hospitalist Service, GOLD TEAM        Date of Admission:  6/4/2023    Assessment & Plan      Dinora Mcghee is a 58 year old female patient with a complicated past medical history significant for gallstone s/p cholecystectomy (1996), s/p multiple ERCP c/b recurrent pancreatitis, chronic abdominal pain, gastroparesis c/b chronic nausea and vomiting, chronic intermittent pancreatitis, s/p splenectomy and appendectomy, chronic malnutrition/FTT on G-tube s/p multiple G/J-tube adjustments/laparotomy s/p TPIT (2016), Hx of GERD, hypothyroidism, uterine tumor s/p hysterectomy now on regular diet, TPN and TF at home who presented to [facility] ED secondary to concerns regarding fever and body aches since 6/2/23.    Addendum: 1/2 bottle blood culture positive for gram negative bacilli. Will change to Meropenem after discussion with pharmacy. MRSA swab pending. Can likely discontinue Vancomycin if negative. Repeat blood cultures ordered for am.    Sepsis - source unknown currently  Elevated Lactate  Fever  Body Aches  Patient noted onset of fevers (highest 101.7F today), wide spread body aches (head to toes, no meningeal signs), rising LFTs, poor appetite, acute on chronic abdominal pain and worsening of baseline diarrhea. Lactate 2.7-->1.3 after 1L NS in ED. WBC 8.8 with no left shift. Procalc 6.33. UA negative for infection. CXR negative for infiltrate. CTA C/A/P done at [place] 6/3/23 with no acute findings; ligated splenic artery in the setting of splenectomy, Right IJ Port-A-Cath tip at the cavoatrial junction, percutaneous jejunostomy catheter, additional percutaneous catheter with associated metallic streak artifact in the retrogastric space status post pancreatectomy and cholecystectomy, no ascites.   - Concern for possible line infection: blood cultures pending, continue Ertapenem and Vancomycin started in ED for now. IVF hydration  overnight.   - Concern for possible tick-born illness (no known tick bites but has found several on her dogs): Labs sent for lyme, ehrlichia, babesia.   - C. Diff and stool culture panel sent. Hep A, B, C sent.   - Beta D glucan pending   - ID consult tomorrow    Transaminitis  Noted rising LFTs as of ~3 weeks ago possibly related to TPN. Lipase and total bili WNL. . LFTs are higher than documented at [place] in [place] day prior to admission.   - Hep A, B, C ordered   - Check inr   - Tick-borne illness work up as above   - RUQ US with doppler ordered   - Recheck CMP in am   - GI consult    Acute on Chronic Abdominal Pain  Related to above complicated GI history. Seeing [facility] Pain specialists and being evaluated for spinal cord stimulator. Patient reporting typically has abdominal pain related to gastroparesis, but has been significantly worse particularly on the left side in addition to wide spread body aches from her head to her toes. COVID/Flu/RSV negative on 6/3/23.   - Bacteremia vs tick born illness work up as above   - Oxycodone and dilaudid available for now, please wean as tolerated   - Continue PTA Robaxin QID prn and Flexeril at bedtime prn, continue PTA Elavil at bedtime.   - Tylenol on hold for now while level pending and while monitoring LFTs   - GI consult as above   - Consider pain consult if needed    J Tube in place  G Tube in place  Gastroparesis c/b Chronic Nausea  Chronic Constipation   - Regular diet (eating limited amounts of food at home)   - TPN on hold until line infection ruled out   - TF to resume tomorrow (appreciate nutrition assistance)   - Phenergan and Compazine available for nausea   - Continue PTA Motegrity and scheduled senna (hold for loose stools)   - Scopolamine patch   - Line/drain cares   - Monitor I&O   - Please note that she puts crushed pills through her G tube, all liquid meds and TF through J tube.    Severe  Malnutrition  Hypokalemia  Hypomagnesemia  Hypophosphatemia  On TPN (unsure of rate currently, runs for 14 hours each day concurrently with TF) and TF (vivonex rate 30cc/hr x 14 hours daily, starting ~2000 each night). Regular diet as well, though has not eaten x 3 days secondary to feeling very poorly. Mag 1.5, phos 1.8, K 3.5 at admission.   - Aggressively replace electrolytes and continue thiamine   - Nutrition consult for TF to resume tomorrow   - TPN on hold until line infection ruled out   - Continue regular diet as tolerated   - Consider start D10 if hypoglycemia develops   - LR ordered for now at maintenance fluid rate    Acute on Chronic Diarrhea  Frequently with loose stools but reports significantly worse past 3 days (no melena or hematochezia). Is having acute on chronic abdominal pain.   - Check for C. Diff and stool culture panel   - Hold stool softeners for now    Chest Pressure  Noted today alongside body aches, resolved with dilaudid. EKG without evidence of ST segment elevation or depression. Trop 7.   - Tele overnight   - Delta trop pending    History of pancreatectomy related DM  S/p islet cell transplant 12/28/2016  Typically there is insulin in her TPN. Last A1C 5.5 ~6 months ago.   - BG Q4H with hypoglycemia protocol   - If concern for hyperglycemia, can consider sliding scale insulin   - If hypoglycemia, can start D10 (typical TF rate 30cc/hr so would need this at the very minimum).   - Continue creon with meals    Incidental Lung Nodule  3 mm right upper lobe nodule on series 3 image 103 noted on CTA C/A/P 6/3/23 done at [place].   - Will need follow up imaging    GERD  Candida esophagitis  Finished fluconazole 400 mg suspension daily per G-tube for 21 day course.   - Continue PTA IV protonix in am and pepcid in pm     Hypothyroidism   - Continue PTA levothyroxine    H/o Migraines  Uses Botox injection and Zomig at home.   - Please resume PTA Zomig if repeat troponin negative (if no concern for  ACS)     Seasonal Allergies   - Continue Zyrtec and Singulair       Diet: Regular Diet Adult  DVT Prophylaxis: Pneumatic Compression Devices, please start lovenox if no procedures indicated (awaiting GI involvement)  Piedra Catheter: Not present  Lines: PRESENT             Cardiac Monitoring: ACTIVE order. Indication: Chest pain/ ACS rule out (24 hours)  Code Status: No CPR- Pre-arrest intubation OK    Clinically Significant Risk Factors Present on Admission            # Hypomagnesemia: Lowest Mg = 1.5 mg/dL in last 2 days, will replace as needed                     Disposition Plan      Expected Discharge Date: 06/06/2023                The patient's care was discussed with the Attending Physician, Dr. Kearney.    Crescencio Mauro PA-C  Hospitalist Service, [facility]  Securely message with [email or web address] (more info)  Text page via [facility] Paging/Directory   See signed in provider for up to date coverage information    ______________________________________________________________________    Chief Complaint   Fever and body aches    History is obtained from the patient, , EMR.    History of Present Illness   Dinora Mcghee is a 58 year old female patient with a complicated past medical history significant for gallstone s/p cholecystectomy (1996), s/p multiple ERCP c/b recurrent pancreatitis, chronic abdominal pain, gastroparesis c/b chronic nausea and vomiting, chronic intermittent pancreatitis, s/p splenectomy and appendectomy, chronic malnutrition/FTT on G-tube s/p multiple G/J-tube adjustments/laparotomy s/p TPIT (2016), Hx of GERD, hypothyroidism, uterine tumor s/p hysterectomy now on regular diet, TPN and TF at home who presented to [facility] ED secondary to concerns regarding fever and body aches since 6/2/23.    Patient reports on Friday she began to experience fevers, chills and wide spread body aches with acute on chronic abdominal pain. Fever up  to 101.7F today. Some chest pressure today that dissipated after dilaudid. No chest pain, shortness of breath. No vomiting in ED. No melena, hematochezia, hematemesis. Having several episodes of diarrhea which is worse from her baseline. No urinary complaints.    Past Medical History    Past Medical History:   Diagnosis Date     Allergic rhinitis, cause unspecified      Allergy to other foods     strawberries, apples, celeries, alice, watermelon     Arthritis     left knee     Choledocholithiasis     long after cholecystectomy     Chronic abdominal pain      Chronic constipation      Chronic nausea      Chronic pancreatitis (H)      Degeneration of lumbar or lumbosacral intervertebral disc      Esophageal reflux     w/ hiatal hernia     Gastroparesis      Hiatal hernia      History of pituitary adenoma     s/p resection     Hypothyroidism      Migraines      Mild hyperlipidemia      On tube feeding diet     presence of GJ tube     Pancreatic disease     PD stricture, 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 endometriosis growth     APPENDECTOMY        SECTION       COLONOSCOPY       ENDOSCOPIC INSERTION TUBE GASTROSTOMY N/A 2021    Procedure: Gastrojejonostomy placement with jejunopexy, PEG tube exchange;  Surgeon: Zackery Montoya MD;  [facility]     ENDOSCOPIC RETROGRADE CHOLANGIOPANCREATOGRAM N/A 2015    Procedure: ENDOSCOPIC RETROGRADE CHOLANGIOPANCREATOGRAM;  Surgeon: Mandeep Park MD;  [facility]     ENDOSCOPIC RETROGRADE CHOLANGIOPANCREATOGRAM N/A 2016    Procedure: COMBINED ENDOSCOPIC RETROGRADE CHOLANGIOPANCREATOGRAPHY,  PLACE TUBE/STENT;  Surgeon: Mandeep Park MD;  [facility]     ENDOSCOPIC RETROGRADE CHOLANGIOPANCREATOGRAM N/A 3/17/2016    Procedure: COMBINED ENDOSCOPIC RETROGRADE CHOLANGIOPANCREATOGRAPHY, REMOVE FOREIGN BODY OR STENT/TUBE;  Surgeon: Mandeep Park MD;  [facility]     ENDOSCOPIC RETROGRADE CHOLANGIOPANCREATOGRAM N/A 8/2/2016    Procedure: COMBINED ENDOSCOPIC RETROGRADE CHOLANGIOPANCREATOGRAPHY, PLACE TUBE/STENT;  Surgeon: Mandeep Park MD;  [facility]     ENDOSCOPIC RETROGRADE CHOLANGIOPANCREATOGRAM N/A 8/26/2016    Procedure: COMBINED ENDOSCOPIC RETROGRADE CHOLANGIOPANCREATOGRAPHY, REMOVE FOREIGN BODY OR STENT/TUBE;  Surgeon: Mandeep Park MD;  [facility]     ENDOSCOPIC ULTRASOUND UPPER GASTROINTESTINAL TRACT (GI) N/A 10/3/2016    Procedure: ENDOSCOPIC ULTRASOUND, ESOPHAGOSCOPY / UPPER GASTROINTESTINAL TRACT (GI);  Surgeon: Guru Jose Rodas MD;  [facility]     ENTEROSCOPY SMALL BOWEL N/A 2/3/2022    Procedure: ENTEROSCOPY with possible fistula closure;  Surgeon: Francisco Dodson MD;  Location:  GI     ESOPHAGOSCOPY, GASTROSCOPY, DUODENOSCOPY (EGD), COMBINED N/A 6/24/2015    Procedure: COMBINED ESOPHAGOSCOPY, GASTROSCOPY, DUODENOSCOPY (EGD), REMOVE FOREIGN BODY;  Surgeon: Mandeep Park MD;  Location:  GI     ESOPHAGOSCOPY, GASTROSCOPY, DUODENOSCOPY (EGD), COMBINED N/A 10/25/2015    Procedure: COMBINED ESOPHAGOSCOPY, GASTROSCOPY, DUODENOSCOPY (EGD);  Surgeon: Sammy Amaro MD;  Location:  GI     ESOPHAGOSCOPY, GASTROSCOPY, DUODENOSCOPY (EGD), COMBINED N/A 10/25/2015    Procedure: COMBINED ESOPHAGOSCOPY, GASTROSCOPY, DUODENOSCOPY (EGD), BIOPSY SINGLE OR MULTIPLE;  Surgeon: Sammy Amaro MD;  Location:  GI     ESOPHAGOSCOPY, GASTROSCOPY, DUODENOSCOPY (EGD), COMBINED N/A 1/31/2023    Procedure: ESOPHAGOGASTRODUODENOSCOPY (EGD) with peg replacement ;  Surgeon: Mandeep Park MD;  [facility]      ESOPHAGOSCOPY, GASTROSCOPY, DUODENOSCOPY (EGD), DILATATION, COMBINED       EXCISE LESION TRUNK N/A 4/17/2017    Procedure: EXCISE LESION TRUNK;  Removal of Abdominal Foreign Body;  Surgeon: Nestor Phoenix MD;  [facility]     HC ESOPH/GAS REFLUX TEST W NASAL IMPED >1 HR N/A 11/19/2015    Procedure: ESOPHAGEAL IMPEDENCE FUNCTION TEST WITH 24 HOUR PH GREATER THAN 1 HOUR;  Surgeon: Thiago Apple MD;  [facility]     HC UGI ENDOSCOPY DIAG W BIOPSY  9/17/08     HC UGI ENDOSCOPY DIAG W BIOPSY  9/27/12     HC UGI ENDOSCOPY W ESOPHAGEAL DILATION BALLOON <30MM  9/17/08     HC UGI ENDOSCOPY W EUS N/A 5/5/2015    Procedure: COMBINED ENDOSCOPIC ULTRASOUND, ESOPHAGOSCOPY, GASTROSCOPY, DUODENOSCOPY (EGD);  Surgeon: Wm Dueñas MD;  [facility]     HC WRIST ARTHROSCOP,RELEASE XVERS LIG Bilateral 12/17/08     INJECT TRANSVERSUS ABDOMINIS PLANE (TAP) BLOCK BILATERAL Left 9/22/2016    Procedure: INJECT TRANSVERSUS ABDOMINIS PLANE (TAP) BLOCK BILATERAL;  Surgeon: Dickson Corrigan MD;  [facility]     INJECT TRIGGER POINT Bilateral 9/8/2022    Procedure: Abdominal trigger point injection with ultrasound;  Surgeon: Monika Mahajan MD;  [facility]     INJECT TRIGGER POINT SINGLE / MULTIPLE 3 OR MORE MUSCLES Right 11/15/2022    Procedure: Trigger point injections right abdomen with ultrasound guidance;  Surgeon: Monika Mahajan MD;  [facility]     IR CHEST PORT PLACEMENT < 5 YRS OF AGE  6/10/2022     laparoscopic pineda  1995     LAPAROSCOPIC HERNIORRHAPHY INCISIONAL N/A 8/23/2018    Procedure: LAPAROSCOPIC HERNIORRHAPHY INCISIONAL;  Laparoscopic Incisional Hernia Repair with Symbotex Mesh Implant;  Surgeon: Nestor Phoenix MD;  [facility]     LAPAROSCOPIC PANCREATECTOMY, TRANSPLANT AUTO ISLET CELL N/A 12/28/2016    Procedure: LAPAROSCOPIC PANCREATECTOMY, TRANSPLANT AUTO ISLET CELL;  Surgeon: Nestor Phoenix MD;  [facility]     LAPAROTOMY EXPLORATORY N/A 1/31/2023    Procedure:  Exploratory Laparotomy, lysis of adhesions, Perforated J-Junostomy Resection, Replace J-Junostomy site;  Surgeon: Elver Bauer MD;  [facility]     LAPAROTOMY, LYSIS ADHESIONS, COMBINED N/A 1/31/2023    Procedure: Dilatation of jejunostomy feeding tube, track with placement of jejunostomy tube with fluoroscopy;  Surgeon: Elver Bauer MD;  [facility]     REMOVE AND REPLACE BREAST IMPLANT PROSTHESIS N/A 12/30/2022    Procedure: Exploratory Laparotomy, lysis of adhesions, small bowel resection. Placement of gastric jejunostomy for enteral feeding.;  Surgeon: Elver Bauer MD;  [facility]     REMOVE GASTROSTOMY TUBE ADULT N/A 1/28/2022    Procedure: REMOVAL, GASTROSTOMY TUBE, ADULT;  Surgeon: Mandeep Park MD;  [facility]     REPLACE GASTROJEJUNOSTOMY TUBE, PERCUTANEOUS N/A 9/7/2021    Procedure: GASTROJEJUNOSTOMY TUBE PLACEMENT, PERCUTANEOUS, WITH GASTROPEXY;  Surgeon: Mandeep Prak MD;  [facility]     REPLACE GASTROJEJUNOSTOMY TUBE, PERCUTANEOUS N/A 9/22/2021    Procedure: REPLACEMENT, GASTROJEJUNOSTOMY TUBE, PERCUTANEOUS;  Surgeon: Zackery Montoya MD;  [facility]     REPLACE GASTROJEJUNOSTOMY TUBE, PERCUTANEOUS N/A 11/22/2022    Procedure: REPLACEMENT, GASTROJEJUNOSTOMY TUBE, PERCUTANEOUS;  Surgeon: Mandeep Park MD;  [facility]     REPLACE GASTROJEJUNOSTOMY TUBE, PERCUTANEOUS N/A 12/9/2022    Procedure: REPLACEMENT, GASTROJEJUNOSTOMY TUBE, PERCUTANEOUS with ENDOSCOPIC SUTURING.;  Surgeon: Mandeep Park MD;  [facility]     REPLACE JEJUNOSTOMY TUBE, PERCUTANEOUS N/A 9/10/2021    Procedure: UPPER ENDOSCOPY, REPLACEMENT OF PERCUTANEOUS GASTROJEJUNOSTOMY TUBE, T-TAG GASTROPEXY;  Surgeon: Zackery Montoya MD;  [facility]     REPLACE JEJUNOSTOMY TUBE, PERCUTANEOUS N/A 12/29/2021    Procedure: REPLACEMENT, JEJUNOSTOMY TUBE, PERCUTANEOUS;  Surgeon: Mandeep Park MD;  [facility]      REPLACE JEJUNOSTOMY TUBE, PERCUTANEOUS N/A 2023    Procedure: REPLACEMENT, JEJUNOSTOMY TUBE, PERCUTANEOUS;  Surgeon: Mandeep Park MD;  [facility]     transphenoidal pituitary resection       [facility] [place]  DELIVERY ONLY       Z  DELIVERY ONLY      repeat c section with incidental cystotomy with repair     ZC EXCIS PITUITARY,TRANSNASAL/SEPTAL      pituitary tumor removed for diabetes insipidus     Z TOTAL ABDOM HYSTERECTOMY      w/ bilateral salpingoophorectomy        Prior to Admission Medications   Prior to Admission Medications   Prescriptions Last Dose Informant Patient Reported? Taking?   Continuous Blood Gluc Sensor (FREESTYLE LISA 2 SENSOR) [facility]   No No   Sig: USE ONE SENSOR ONCE EVERY 14 DAYS   Continuous Blood Gluc Sensor (FREESTYLE LISA 3 SENSOR) [facility]   No No   Si each every 14 days   LACTATED RINGERS IV   Yes No   Sig: Inject 1 L into the vein daily   Nutritional Supplements (BOOST HIGH PROTEIN) LIQD   No No   Sig: After above baseline labs are drawn, give: 6 mL/kg to maximum of 360 mL; the beverage is to be consumed within 5 minutes.   STATIN NOT PRESCRIBED (INTENTIONAL)   Yes No   Sig: Previous liver issues, risks vs benefits felt to not warrant statin.    Discussed Oct 2022 visit   Sharps Container (BD SHARPS ) MISC   No No   Si Container as needed   ZOLMitriptan (ZOMIG-ZMT) 5 MG ODT   No No   Sig: Take 1 tablet (5 mg) by mouth at onset of headache for migraine May repeat in 2 hours. Max 2 tablets/24 hours.   acetaminophen (TYLENOL) 325 MG/10.15ML solution   No No   Si.3 mLs (650 mg) by Per J Tube route every 6 hours as needed for mild pain or fever   amitriptyline (ELAVIL) 10 MG tablet   No No   Sig: Take 1 tablet (10 mg) by mouth At Bedtime   amitriptyline (ELAVIL) 10 MG tablet   No No   Sig: Take 2 tablets (20 mg) by mouth At Bedtime Take 2 tabs for 5 days then increase to 3 tabs for 5 days then increase to 4 tabs for 5 days.  "  amylase-lipase-protease (CREON) 27236-59818 units CPEP per EC capsule   No No   Sig: Take 3-4 capsules by mouth 3 times daily (with meals) With oral meals   blood glucose (PAT CONTOUR NEXT) test strip   No No   Sig: Use to test blood sugar 6 times daily or as directed.   blood glucose monitoring (PAT MICROLET) lancets   No No   Sig: Use to test blood sugar 6-8 times daily or as directed.   cetirizine (ZYRTEC) 10 MG tablet   No No   Si tablet (10 mg) by Per G Tube route daily   cyclobenzaprine (FLEXERIL) 10 MG tablet   No No   Si tablet (10 mg) by Per G Tube route 2 times daily as needed for muscle spasms   diphenhydrAMINE (BENADRYL ALLERGY) 25 MG capsule   No No   Sig: Take 1 capsule (25 mg) by mouth every 6 hours as needed for itching or allergies   estradiol (VAGIFEM) 10 MCG TABS vaginal tablet   No No   Sig: INSERT 1 TABLET(10 MCG) VAGINALLY 2 TIMES A WEEK   famotidine (PEPCID) 40 MG/5ML suspension   No No   Si.5 mLs (20 mg) by Per J Tube route daily   glucagon 1 MG kit   No No   Sig: Give 0.1 to 0.15mg( 10-15 units on Insulin sryinge) subcutaneous  every 15 minutes PRN for hypoglycemia. Remix new kit q24hr. Needs up to 3 kit/week.   glucose 40 % GEL gel   No No   Sig: Take 15-30 g by mouth every 15 minutes as needed for low blood sugar   ibuprofen (ADVIL/MOTRIN) 400 MG tablet   No No   Sig: Take 1 tablet (400 mg) by mouth every 6 hours as needed for moderate pain   insulin syringe-needle U-100 (30G X 1/2\" 0.3 ML) 30G X 1/2\" 0.3 ML miscellaneous   No No   Sig: Use 3 syringes daily or as directed.   ketorolac (TORADOL) 10 MG tablet   No No   Sig: Take 1 tablet (10 mg) by mouth every 6 hours as needed for moderate pain   levothyroxine (SYNTHROID/LEVOTHROID) 137 MCG tablet   Yes No   Si mcg by Per G Tube route daily   melatonin 1 MG TABS tablet   No No   Sig: Take 1 tablet (1 mg) by mouth nightly as needed for sleep   methocarbamol (ROBAXIN) 750 MG tablet   No No   Si tablet (750 mg) by " Per Feeding Tube route 4 times daily   montelukast (SINGULAIR) 10 MG tablet   Yes No   Sig: 10 mg by Per G Tube route At Bedtime   pantoprazole (PROTONIX) 40 mg IV push injection   No No   Sig: Inject 40 mg into the vein daily (with breakfast)   parenteral nutrition - PTA/DISCHARGE ORDER   No No   Sig: The TPN formula will print on the After Visit Summary Report.   promethazine-phenylephrine (PROMETHAZINE VC) 6.25-5 MG/5ML syrup   No No   Si mLs (25 mg) by Per Feeding Tube route nightly as needed for nausea - Per Feeding Tube   prucalopride succinate (MOTEGRITY) 2 MG tablet   No No   Sig: Take 1 tablet (2 mg) by mouth daily   scopolamine (TRANSDERM) 1 MG/3DAYS 72 hr patch   No No   Sig: Place 1 patch onto the skin every 72 hours   sennosides (SENOKOT) 8.8 MG/5ML syrup   No No   Si mLs by Per J Tube route 2 times daily   thiamine (B-1) 100 MG tablet   No No   Si tablet (100 mg) by Per J Tube route daily   zinc oxide - white petrolatum (CRITIC-AID THICK MOIST BARRIER) 20-51% PSTE topical paste   No No   Sig: Apply 71 g topically every hour as needed for rash (Under J tube bumper when needed for skin protection)      Facility-Administered Medications Last Administration Doses Remaining   Botulinum Toxin Type A (BOTOX) 200 units injection 155 Units None recorded            Review of Systems    The 10 point Review of Systems is negative other than noted in the HPI or here.    Social History   I have reviewed this patient's social history and updated it with pertinent information if needed.  Social History     Tobacco Use     Smoking status: Former     Packs/day: 0.50     Years: 6.00     Pack years: 3.00     Types: Cigarettes     Start date: 1985     Quit date: 1992     Years since quittin.4     Tobacco comments:     no 2nd hand   Substance Use Topics     Alcohol use: Not Currently     Alcohol/week: 3.0 - 6.0 standard drinks of alcohol     Types: 1 - 2 Glasses of wine, 1 - 2 Cans of beer, 1 - 2  Shots of liquor per week     Comment: none since IGG     Drug use: No       Family History   I have reviewed this patient's family history and updated it with pertinent information if needed.  Family History   Problem Relation Age of Onset     Lipids Mother      Hypertension Mother      Thyroid Disease Mother      Depression Mother      Angina Mother      GERD Mother      Skin Cancer Mother      Migraines Mother      Autoimmune Disease Mother      Hyperlipidemia Mother      Mental Illness Mother      Eye Disorder Father         cataract, detached retina     Myocardial Infarction Father 60     Lipids Father      Cerebrovascular Disease Father      Depression Father      Substance Abuse Father      Anesthesia Reaction Father         stroke right after surgery     Cataracts Father      Osteoarthritis Father      Ulcerative Colitis Father      Autoimmune Disease Father      Heart Disease Father      Hyperlipidemia Father      Mental Illness Father      Interpersonal Violence Sister      Coronary Artery Disease Sister         angioplasty     GERD Sister      Substance Abuse Sister      Depression Sister      Thyroid Disease Sister      Eye Disorder Maternal Grandmother         cataract     Thyroid Disease Maternal Grandmother      Diabetes Maternal Grandfather      Eye Disorder Paternal Grandmother         cataract     Diabetes Paternal Grandmother      Eye Disorder Paternal Grandfather         cataract     Diabetes Paternal Grandfather      Substance Abuse Paternal Grandfather      Eye Disorder Son         ptosis     Depression Son      Anxiety Disorder Son      Heart Disease Paternal Aunt      Diabetes Paternal Aunt      Diabetes Paternal Uncle      Heart Disease Paternal Uncle      Depression Nephew      Anxiety Disorder Nephew      Thyroid Disease Nephew      Diabetes Type 2  Cousin         paternal cousin     Autoimmune Disease Cousin      Autoimmune Disease Sister      Depression Sister      Mental Illness Sister       Substance Abuse Sister      Thyroid Disease Sister      Depression Son      Mental Illness Son      Thyroid Disease Nephew        Allergies   Allergies   Allergen Reactions     Apple Juice Anaphylaxis     Corticosteroids Other (See Comments)     All oral, IV and injectable steroids are contraindicated (unless in life threatening situations) in Islet Auto transplant recipients. They can cause irreversible loss of islet cell function. Please contact patient's transplant care coordinator ADI Gaffney RN at [phone]/pager [phone] and/or endocrinologist prior to administration.       Depakote [Divalproex Sodium] Other (See Comments)     Chest pain     Zithromax [Azithromycin Dihydrate] Anaphylaxis     Noted in 4/7/08 ER     Bromfenac      Other reaction(s): Headache     Codeine      Other reaction(s): Hallucinations     Darvocet [Propoxyphene N-Apap] Itching     Morphine Nausea and Vomiting and Rash     Nalbuphine Hcl Rash     RASH :nubaine     Zosyn [Piperacillin-Tazobactam In Dex] Rash     Possible allergy, did have a diffuse rash that seemed drug related but could have also been related to soap in the hospital.      Bactrim [Sulfamethoxazole W-Trimethoprim] Other (See Comments) and Nausea and Vomiting     Severely low liver function.     Statins Other (See Comments)     Previous liver issues, risks vs benefits felt to not warrant statin.  Discussed Oct 2022 visit     Tape [Adhesive Tape] Blisters     Tramadol      Zofran [Ondansetron] Other (See Comments)     migraine     Flagyl [Metronidazole] Hives and Rash        Physical Exam   Vital Signs: Temp: 98.8  F (37.1  C) Temp src: Oral BP: 113/68 Pulse: 98   Resp: 18 SpO2: 93 % O2 Device: None (Room air)    Weight: 135 lbs 0 oz    GENERAL: Alert and oriented x 3. Well nourished, well developed.  No acute distress.    HEENT: Normocephalic, atraumatic. Anicteric sclera. Mucous membranes moist.   CV: RRR. S1, S2. No murmurs appreciated.   RESPIRATORY: Effort  normal on room air. Lungs CTAB with no wheezing, rales, or rhonchi.   GI: Abdomen soft and non distended, bowel sounds present x all 4 quadrants. Diffuse generalized abdominal pain worse on R side. No rigidity, rebound or guarding.  NEUROLOGICAL: No focal deficits noted. Follows commands.  MUSCULOSKELETAL: No joint swelling or tenderness. Moves all extremities.   EXTREMITIES: No gross deformities. No peripheral edema.   SKIN: Grossly warm, dry, and intact. No jaundice. No rashes.     Medical Decision Making       90 MINUTES SPENT BY ME on the date of service doing chart review, history, exam, documentation & further activities per the note.      Data   Imaging results reviewed over the past 24 hrs:   Recent Results (from the past 24 hour(s))   XR Chest Port 1 View    Impression    RESIDENT PRELIMINARY INTERPRETATION  IMPRESSION:   No acute airspace disease.     Recent Labs   Lab 06/04/23  1348   WBC 8.8   HGB 11.8   MCV 96         POTASSIUM 3.5   CHLORIDE 105   CO2 21*   BUN 11.0   CR 0.50*   ANIONGAP 14   KASSI 8.9   *   ALBUMIN 3.6   PROTTOTAL 6.2*   BILITOTAL 0.7   ALKPHOS 281*   *   *   LIPASE 6*

## 2023-06-04 NOTE — PHARMACY-VANCOMYCIN DOSING SERVICE
Pharmacy Vancomycin Initial Note  Date of Service 2023  Patient's  1965  58 year old, female    Indication: Sepsis    Current estimated CrCl = Estimated Creatinine Clearance: 119.9 mL/min (A) (based on SCr of 0.5 mg/dL (L)).    Creatinine for last 3 days  2023:  1:48 PM Creatinine 0.50 mg/dL    Recent Vancomycin Level(s) for last 3 days  No results found for requested labs within last 3 days.      Vancomycin IV Administrations (past 72 hours)                   vancomycin (VANCOCIN) 1,500 mg in 0.9% NaCl 250 mL intermittent infusion (mg) 1,500 mg New Bag 23 1629                Nephrotoxins and other renal medications (From now, onward)    Start     Dose/Rate Route Frequency Ordered Stop    23 1530  vancomycin (VANCOCIN) 1,500 mg in 0.9% NaCl 250 mL intermittent infusion         1,500 mg  over 90 Minutes Intravenous ONCE 23 1502      23 1503  vancomycin place tucker - receiving intermittent dosing         1 each Intravenous SEE ADMIN INSTRUCTIONS 23 1504            Contrast Orders - past 72 hours (72h ago, onward)    None          [employer] Prediction of Planned Initial Vancomycin Regimen  Loading dose: 1500 mg IV at 16:29 on 2023  Regimen: 1250 mg IV every 12 hours.  Start time: 04:29 on 2023  Exposure target: AUC24 (range)400-600 mg/L.hr   AUC24,ss: 553 mg/L.hr  Probability of AUC24 > 400: 80 %  Ctrough,ss: 15.7 mg/L  Probability of Ctrough,ss > 20: 33 %  Probability of nephrotoxicity (Lodise ALEKSANDAR ): 11 %          Plan:  1. Give vancomycin 1500mg IV x1 now, then start vancomycin 1250mg IV q12 hours  2. Vancomycin monitoring method: AUC  3. Vancomycin therapeutic monitoring goal: 400-600 mg*h/L  4. Pharmacy will check vancomycin levels as appropriate in 1-3 Days.    5. Serum creatinine levels will be ordered daily for the first week of therapy and at least twice weekly for subsequent weeks.      Husam Nunez, PharmD, BCPS

## 2023-06-05 ENCOUNTER — APPOINTMENT (OUTPATIENT)
Dept: MRI IMAGING | Facility: CLINIC | Age: 58
End: 2023-06-05
Attending: STUDENT IN AN ORGANIZED HEALTH CARE EDUCATION/TRAINING PROGRAM
Payer: COMMERCIAL

## 2023-06-05 ENCOUNTER — APPOINTMENT (OUTPATIENT)
Dept: ULTRASOUND IMAGING | Facility: CLINIC | Age: 58
End: 2023-06-05
Attending: STUDENT IN AN ORGANIZED HEALTH CARE EDUCATION/TRAINING PROGRAM
Payer: COMMERCIAL

## 2023-06-05 LAB
ACINETOBACTER SPECIES: NOT DETECTED
ALBUMIN SERPL BCG-MCNC: 3.2 G/DL (ref 3.5–5.2)
ALP SERPL-CCNC: 222 U/L (ref 35–104)
ALT SERPL W P-5'-P-CCNC: 306 U/L (ref 10–35)
ANION GAP SERPL CALCULATED.3IONS-SCNC: 9 MMOL/L (ref 7–15)
AST SERPL W P-5'-P-CCNC: 143 U/L (ref 10–35)
ATRIAL RATE - MUSE: 112 BPM
B BURGDOR IGG+IGM SER QL: 0.11
BILIRUB SERPL-MCNC: 0.4 MG/DL
BUN SERPL-MCNC: 5.9 MG/DL (ref 6–20)
CALCIUM SERPL-MCNC: 8.3 MG/DL (ref 8.6–10)
CHLORIDE SERPL-SCNC: 106 MMOL/L (ref 98–107)
CITROBACTER SPECIES: NOT DETECTED
CREAT SERPL-MCNC: 0.41 MG/DL (ref 0.51–0.95)
CRP SERPL-MCNC: 96.2 MG/L
CTX-M: NOT DETECTED
DEPRECATED HCO3 PLAS-SCNC: 24 MMOL/L (ref 22–29)
DIASTOLIC BLOOD PRESSURE - MUSE: NORMAL MMHG
ENTEROBACTER SPECIES: NOT DETECTED
ERYTHROCYTE [DISTWIDTH] IN BLOOD BY AUTOMATED COUNT: 14.6 % (ref 10–15)
ESCHERICHIA COLI: NOT DETECTED
GFR SERPL CREATININE-BSD FRML MDRD: >90 ML/MIN/1.73M2
GLUCOSE BLDC GLUCOMTR-MCNC: 128 MG/DL (ref 70–99)
GLUCOSE BLDC GLUCOMTR-MCNC: 148 MG/DL (ref 70–99)
GLUCOSE BLDC GLUCOMTR-MCNC: 157 MG/DL (ref 70–99)
GLUCOSE BLDC GLUCOMTR-MCNC: 158 MG/DL (ref 70–99)
GLUCOSE BLDC GLUCOMTR-MCNC: 163 MG/DL (ref 70–99)
GLUCOSE BLDC GLUCOMTR-MCNC: 178 MG/DL (ref 70–99)
GLUCOSE SERPL-MCNC: 186 MG/DL (ref 70–99)
HAV IGG SER QL IA: NONREACTIVE
HAV IGM SERPL QL IA: NONREACTIVE
HBV SURFACE AB SERPL IA-ACNC: 3.12 M[IU]/ML
HBV SURFACE AB SERPL IA-ACNC: NONREACTIVE M[IU]/ML
HBV SURFACE AG SERPL QL IA: NONREACTIVE
HCT VFR BLD AUTO: 34.5 % (ref 35–47)
HCV AB SERPL QL IA: NONREACTIVE
HGB BLD-MCNC: 10.9 G/DL (ref 11.7–15.7)
IMP: NOT DETECTED
INTERPRETATION ECG - MUSE: NORMAL
KLEBSIELLA OXYTOCA: NOT DETECTED
KLEBSIELLA PNEUMONIAE: DETECTED
KPC: NOT DETECTED
MAGNESIUM SERPL-MCNC: 1.9 MG/DL (ref 1.7–2.3)
MAGNESIUM SERPL-MCNC: 2.3 MG/DL (ref 1.7–2.3)
MCH RBC QN AUTO: 30.5 PG (ref 26.5–33)
MCHC RBC AUTO-ENTMCNC: 31.6 G/DL (ref 31.5–36.5)
MCV RBC AUTO: 97 FL (ref 78–100)
MRSA DNA SPEC QL NAA+PROBE: NEGATIVE
NDM: NOT DETECTED
OXA (DETECTED/NOT DETECTED): NOT DETECTED
P AXIS - MUSE: 52 DEGREES
PHOSPHATE SERPL-MCNC: 2.2 MG/DL (ref 2.5–4.5)
PLATELET # BLD AUTO: 182 10E3/UL (ref 150–450)
POTASSIUM SERPL-SCNC: 3.8 MMOL/L (ref 3.4–5.3)
PR INTERVAL - MUSE: 148 MS
PROCALCITONIN SERPL IA-MCNC: 5.08 NG/ML
PROT SERPL-MCNC: 5.7 G/DL (ref 6.4–8.3)
PROTEUS SPECIES: NOT DETECTED
PSEUDOMONAS AERUGINOSA: NOT DETECTED
QRS DURATION - MUSE: 82 MS
QT - MUSE: 294 MS
QTC - MUSE: 401 MS
R AXIS - MUSE: 2 DEGREES
RBC # BLD AUTO: 3.57 10E6/UL (ref 3.8–5.2)
SA TARGET DNA: POSITIVE
SODIUM SERPL-SCNC: 139 MMOL/L (ref 136–145)
SYSTOLIC BLOOD PRESSURE - MUSE: NORMAL MMHG
T AXIS - MUSE: 41 DEGREES
TSH SERPL DL<=0.005 MIU/L-ACNC: 0.55 UIU/ML (ref 0.3–4.2)
VENTRICULAR RATE- MUSE: 112 BPM
VIM: NOT DETECTED
WBC # BLD AUTO: 14.4 10E3/UL (ref 4–11)

## 2023-06-05 PROCEDURE — 76705 ECHO EXAM OF ABDOMEN: CPT | Mod: 26 | Performed by: STUDENT IN AN ORGANIZED HEALTH CARE EDUCATION/TRAINING PROGRAM

## 2023-06-05 PROCEDURE — 84145 PROCALCITONIN (PCT): CPT | Performed by: PHYSICIAN ASSISTANT

## 2023-06-05 PROCEDURE — C9113 INJ PANTOPRAZOLE SODIUM, VIA: HCPCS | Performed by: PHYSICIAN ASSISTANT

## 2023-06-05 PROCEDURE — 74183 MRI ABD W/O CNTR FLWD CNTR: CPT

## 2023-06-05 PROCEDURE — 250N000009 HC RX 250: Performed by: PHYSICIAN ASSISTANT

## 2023-06-05 PROCEDURE — 99222 1ST HOSP IP/OBS MODERATE 55: CPT | Mod: GC | Performed by: INTERNAL MEDICINE

## 2023-06-05 PROCEDURE — 87040 BLOOD CULTURE FOR BACTERIA: CPT | Performed by: PHYSICIAN ASSISTANT

## 2023-06-05 PROCEDURE — 74183 MRI ABD W/O CNTR FLWD CNTR: CPT | Mod: 26 | Performed by: RADIOLOGY

## 2023-06-05 PROCEDURE — A9585 GADOBUTROL INJECTION: HCPCS | Performed by: STUDENT IN AN ORGANIZED HEALTH CARE EDUCATION/TRAINING PROGRAM

## 2023-06-05 PROCEDURE — 84100 ASSAY OF PHOSPHORUS: CPT | Performed by: INTERNAL MEDICINE

## 2023-06-05 PROCEDURE — 83735 ASSAY OF MAGNESIUM: CPT | Performed by: PHYSICIAN ASSISTANT

## 2023-06-05 PROCEDURE — 86140 C-REACTIVE PROTEIN: CPT | Performed by: PHYSICIAN ASSISTANT

## 2023-06-05 PROCEDURE — 80051 ELECTROLYTE PANEL: CPT | Performed by: PHYSICIAN ASSISTANT

## 2023-06-05 PROCEDURE — 258N000003 HC RX IP 258 OP 636: Performed by: EMERGENCY MEDICINE

## 2023-06-05 PROCEDURE — 76705 ECHO EXAM OF ABDOMEN: CPT

## 2023-06-05 PROCEDURE — 99233 SBSQ HOSP IP/OBS HIGH 50: CPT | Performed by: STUDENT IN AN ORGANIZED HEALTH CARE EDUCATION/TRAINING PROGRAM

## 2023-06-05 PROCEDURE — 120N000002 HC R&B MED SURG/OB UMMC

## 2023-06-05 PROCEDURE — 250N000013 HC RX MED GY IP 250 OP 250 PS 637: Performed by: STUDENT IN AN ORGANIZED HEALTH CARE EDUCATION/TRAINING PROGRAM

## 2023-06-05 PROCEDURE — 36415 COLL VENOUS BLD VENIPUNCTURE: CPT | Performed by: PHYSICIAN ASSISTANT

## 2023-06-05 PROCEDURE — 85027 COMPLETE CBC AUTOMATED: CPT | Performed by: PHYSICIAN ASSISTANT

## 2023-06-05 PROCEDURE — 36591 DRAW BLOOD OFF VENOUS DEVICE: CPT | Performed by: PHYSICIAN ASSISTANT

## 2023-06-05 PROCEDURE — 250N000013 HC RX MED GY IP 250 OP 250 PS 637: Performed by: PHYSICIAN ASSISTANT

## 2023-06-05 PROCEDURE — 255N000002 HC RX 255 OP 636: Performed by: STUDENT IN AN ORGANIZED HEALTH CARE EDUCATION/TRAINING PROGRAM

## 2023-06-05 PROCEDURE — 258N000003 HC RX IP 258 OP 636: Performed by: PHYSICIAN ASSISTANT

## 2023-06-05 PROCEDURE — 99255 IP/OBS CONSLTJ NEW/EST HI 80: CPT | Mod: FS | Performed by: PHYSICIAN ASSISTANT

## 2023-06-05 PROCEDURE — 250N000011 HC RX IP 250 OP 636: Performed by: PHYSICIAN ASSISTANT

## 2023-06-05 PROCEDURE — 250N000011 HC RX IP 250 OP 636: Performed by: STUDENT IN AN ORGANIZED HEALTH CARE EDUCATION/TRAINING PROGRAM

## 2023-06-05 PROCEDURE — 250N000011 HC RX IP 250 OP 636: Performed by: EMERGENCY MEDICINE

## 2023-06-05 PROCEDURE — 87449 NOS EACH ORGANISM AG IA: CPT | Performed by: STUDENT IN AN ORGANIZED HEALTH CARE EDUCATION/TRAINING PROGRAM

## 2023-06-05 PROCEDURE — 36591 DRAW BLOOD OFF VENOUS DEVICE: CPT | Performed by: STUDENT IN AN ORGANIZED HEALTH CARE EDUCATION/TRAINING PROGRAM

## 2023-06-05 PROCEDURE — 84443 ASSAY THYROID STIM HORMONE: CPT | Performed by: PHYSICIAN ASSISTANT

## 2023-06-05 PROCEDURE — 87641 MR-STAPH DNA AMP PROBE: CPT | Performed by: PHYSICIAN ASSISTANT

## 2023-06-05 RX ORDER — GADOBUTROL 604.72 MG/ML
7.5 INJECTION INTRAVENOUS ONCE
Status: COMPLETED | OUTPATIENT
Start: 2023-06-05 | End: 2023-06-05

## 2023-06-05 RX ORDER — POTASSIUM CHLORIDE 20MEQ/15ML
20 LIQUID (ML) ORAL ONCE
Status: COMPLETED | OUTPATIENT
Start: 2023-06-05 | End: 2023-06-05

## 2023-06-05 RX ORDER — SODIUM CHLORIDE, SODIUM LACTATE, POTASSIUM CHLORIDE, CALCIUM CHLORIDE 600; 310; 30; 20 MG/100ML; MG/100ML; MG/100ML; MG/100ML
INJECTION, SOLUTION INTRAVENOUS CONTINUOUS
Status: ACTIVE | OUTPATIENT
Start: 2023-06-05 | End: 2023-06-06

## 2023-06-05 RX ORDER — SALIVA STIMULANT COMB. NO.3
2 SPRAY, NON-AEROSOL (ML) MUCOUS MEMBRANE 4 TIMES DAILY
Status: DISCONTINUED | OUTPATIENT
Start: 2023-06-05 | End: 2023-06-05

## 2023-06-05 RX ORDER — MAGNESIUM SULFATE HEPTAHYDRATE 40 MG/ML
2 INJECTION, SOLUTION INTRAVENOUS ONCE
Status: COMPLETED | OUTPATIENT
Start: 2023-06-05 | End: 2023-06-05

## 2023-06-05 RX ORDER — E-LYTES/CARBOXYMETHYLCELLULOSE
2 AEROSOL, SPRAY WITH PUMP (ML) MUCOUS MEMBRANE 4 TIMES DAILY PRN
Status: DISCONTINUED | OUTPATIENT
Start: 2023-06-05 | End: 2023-06-10 | Stop reason: HOSPADM

## 2023-06-05 RX ORDER — ACETAMINOPHEN 325 MG/1
650 TABLET ORAL EVERY 8 HOURS PRN
Status: DISCONTINUED | OUTPATIENT
Start: 2023-06-05 | End: 2023-06-10 | Stop reason: HOSPADM

## 2023-06-05 RX ORDER — DIPHENHYDRAMINE HCL 12.5MG/5ML
25 LIQUID (ML) ORAL EVERY 6 HOURS PRN
Status: DISCONTINUED | OUTPATIENT
Start: 2023-06-05 | End: 2023-06-10 | Stop reason: HOSPADM

## 2023-06-05 RX ORDER — ACETAMINOPHEN 650 MG/1
650 SUPPOSITORY RECTAL EVERY 8 HOURS PRN
Status: DISCONTINUED | OUTPATIENT
Start: 2023-06-05 | End: 2023-06-10 | Stop reason: HOSPADM

## 2023-06-05 RX ADMIN — MEROPENEM 1 G: 1 INJECTION, POWDER, FOR SOLUTION INTRAVENOUS at 22:34

## 2023-06-05 RX ADMIN — PANTOPRAZOLE SODIUM 40 MG: 40 INJECTION, POWDER, FOR SOLUTION INTRAVENOUS at 09:29

## 2023-06-05 RX ADMIN — HYDROMORPHONE HYDROCHLORIDE 0.3 MG: 1 INJECTION, SOLUTION INTRAMUSCULAR; INTRAVENOUS; SUBCUTANEOUS at 20:02

## 2023-06-05 RX ADMIN — POTASSIUM & SODIUM PHOSPHATES POWDER PACK 280-160-250 MG 1 PACKET: 280-160-250 PACK at 17:38

## 2023-06-05 RX ADMIN — MEROPENEM 1 G: 1 INJECTION, POWDER, FOR SOLUTION INTRAVENOUS at 06:10

## 2023-06-05 RX ADMIN — MEROPENEM 1 G: 1 INJECTION, POWDER, FOR SOLUTION INTRAVENOUS at 13:40

## 2023-06-05 RX ADMIN — SODIUM CHLORIDE, POTASSIUM CHLORIDE, SODIUM LACTATE AND CALCIUM CHLORIDE: 600; 310; 30; 20 INJECTION, SOLUTION INTRAVENOUS at 07:05

## 2023-06-05 RX ADMIN — POTASSIUM & SODIUM PHOSPHATES POWDER PACK 280-160-250 MG 1 PACKET: 280-160-250 PACK at 13:46

## 2023-06-05 RX ADMIN — OXYCODONE HYDROCHLORIDE 5 MG: 5 SOLUTION ORAL at 22:34

## 2023-06-05 RX ADMIN — HYDROMORPHONE HYDROCHLORIDE 0.3 MG: 1 INJECTION, SOLUTION INTRAMUSCULAR; INTRAVENOUS; SUBCUTANEOUS at 05:42

## 2023-06-05 RX ADMIN — SENNOSIDES 5 ML: 8.8 LIQUID ORAL at 10:41

## 2023-06-05 RX ADMIN — SENNOSIDES 5 ML: 8.8 LIQUID ORAL at 20:13

## 2023-06-05 RX ADMIN — DIPHENHYDRAMINE HYDROCHLORIDE 25 MG: 25 SOLUTION ORAL at 06:13

## 2023-06-05 RX ADMIN — FAMOTIDINE 20 MG: 40 POWDER, FOR SUSPENSION ORAL at 22:36

## 2023-06-05 RX ADMIN — VANCOMYCIN HYDROCHLORIDE 1250 MG: 10 INJECTION, POWDER, LYOPHILIZED, FOR SOLUTION INTRAVENOUS at 04:22

## 2023-06-05 RX ADMIN — PROMETHAZINE HYDROCHLORIDE 25 MG: 6.25 SOLUTION ORAL at 02:25

## 2023-06-05 RX ADMIN — PROCHLORPERAZINE EDISYLATE 10 MG: 5 INJECTION INTRAMUSCULAR; INTRAVENOUS at 05:46

## 2023-06-05 RX ADMIN — OXYCODONE HYDROCHLORIDE 5 MG: 5 SOLUTION ORAL at 10:41

## 2023-06-05 RX ADMIN — OXYCODONE HYDROCHLORIDE 5 MG: 5 SOLUTION ORAL at 01:48

## 2023-06-05 RX ADMIN — POTASSIUM & SODIUM PHOSPHATES POWDER PACK 280-160-250 MG 1 PACKET: 280-160-250 PACK at 10:41

## 2023-06-05 RX ADMIN — DIPHENHYDRAMINE HYDROCHLORIDE 25 MG: 25 SOLUTION ORAL at 20:13

## 2023-06-05 RX ADMIN — PROCHLORPERAZINE EDISYLATE 10 MG: 5 INJECTION INTRAMUSCULAR; INTRAVENOUS at 20:13

## 2023-06-05 RX ADMIN — METHOCARBAMOL 750 MG: 750 TABLET ORAL at 06:04

## 2023-06-05 RX ADMIN — POTASSIUM CHLORIDE 20 MEQ: 1.5 SOLUTION ORAL at 10:41

## 2023-06-05 RX ADMIN — AMITRIPTYLINE HYDROCHLORIDE 40 MG: 10 TABLET, FILM COATED ORAL at 22:35

## 2023-06-05 RX ADMIN — LEVOTHYROXINE SODIUM 137 MCG: 0.14 TABLET ORAL at 10:41

## 2023-06-05 RX ADMIN — MAGNESIUM SULFATE IN WATER 2 G: 40 INJECTION, SOLUTION INTRAVENOUS at 10:41

## 2023-06-05 RX ADMIN — ETHAMBUTOL HYDROCHLORIDE 25 MG: 400 TABLET ORAL at 13:46

## 2023-06-05 RX ADMIN — CETIRIZINE HYDROCHLORIDE 10 MG: 10 TABLET, FILM COATED ORAL at 10:41

## 2023-06-05 RX ADMIN — SCOPALAMINE 1 PATCH: 1 PATCH, EXTENDED RELEASE TRANSDERMAL at 12:51

## 2023-06-05 RX ADMIN — THIAMINE HCL TAB 100 MG 100 MG: 100 TAB at 10:41

## 2023-06-05 RX ADMIN — MONTELUKAST 10 MG: 10 TABLET, FILM COATED ORAL at 22:35

## 2023-06-05 RX ADMIN — GADOBUTROL 6 ML: 604.72 INJECTION INTRAVENOUS at 19:19

## 2023-06-05 RX ADMIN — OXYCODONE HYDROCHLORIDE 5 MG: 5 SOLUTION ORAL at 15:54

## 2023-06-05 RX ADMIN — OXYCODONE HYDROCHLORIDE 5 MG: 5 SOLUTION ORAL at 06:52

## 2023-06-05 ASSESSMENT — ACTIVITIES OF DAILY LIVING (ADL)
ADLS_ACUITY_SCORE: 22

## 2023-06-05 NOTE — PROVIDER NOTIFICATION
HIGH  5B 5220   Jacoby Lockhart RN [id number]  Pt with +BC- Peripheral draw 6/4 @ 5079- Indicating Klebsiella Pneumoniae. Currently on vanco and Merrem. Can you please call back to confirm. Thanks      Jacoby NEWMAN text paged at [phone] via [facility].

## 2023-06-05 NOTE — PROVIDER NOTIFICATION
HIGH  5B 5220 EO  Jacoby Lockhart RN [id number]  +BC- taken at 1439 from peripheral poke. gram negative Bacilli. Pt currently on Vanco and Invanz. Please call back to confirm received. Thanks    Jacoby NEWMAN text paged at [phone] via [facility]     Addendum: Jacoby NEWMAN called back and stated that ID is aware and orders to be placed.

## 2023-06-05 NOTE — PROVIDER NOTIFICATION
5B 4028 EFRA Lockhart RN [id number]  Can you call me to discuss +BC for pt and continued use of Port? Thanks    Jacoby NEWMAN text paged at [phone] via [facility].     Addendum: CC MD called writer back and stated ok to continue port use tonight d/t pt's only access and need for PICC team to place PIV d/t hard stick per pt.

## 2023-06-05 NOTE — PROVIDER NOTIFICATION
HIGH   5292   Jcaoby Lockhart RN [id number]  CRITICAL LAB  +BC- Venous Line- 6/4 @ 5520 Gram Negative Bacilli. Please call back to confirm received. Thanks    Jacoby NEWMAN text paged at [phone] via [facility].

## 2023-06-05 NOTE — SUMMARY OF CARE
Pt arrived to unit at 2150. Pt able to ambulate to bed from stretcher. Belongings include   Purse  Phone  Sunglasses  Phone   Clothing  Shoes   glasses    2 RN Skin check completed with Traci ANDERSON- Intact. Redness noted under J tube- cleansed and dressing changed per pt. No other open areas noted.     Pt oriented to room. Call light within reach.     RN Decompensation Assessment   (Additional guidance for RRT)      Mentation:  Exam is notable for normal (baseline) mental status    Respiratory mechanics and oxygenation:  Exam is notable for normal/unchanged breathing pattern    Cardiovascular/perfusion:   Exam is notable for normal/unchanged cardiovascular/perfusion assessment    Overall estimation of the patient s condition/stability (1 = stable patient, 7 = appears very sick)? 5         Elsie Rocha RN

## 2023-06-05 NOTE — PROVIDER NOTIFICATION
Provider notified (name): Khadar  Reason for notification:  Can you place order for PRN biotene spray for dry mouth per pt request?

## 2023-06-05 NOTE — PLAN OF CARE
"Assumed cares [id number]     /85 (BP Location: Left arm)   Pulse 82   Temp 98.8  F (37.1  C) (Oral)   Resp 16   Ht 1.727 m (5' 8\")   Wt 62.2 kg (137 lb 3.2 oz)   SpO2 98%   BMI 20.86 kg/m       Pain: Patient has back and neck pain. PRN oxy given.   Neuro: A&Ox4.    Respiratory: Lungs clear and equal, on RA.   Cardiac/Neurovascular: HR and pulse WDL. No numbness or tingling reported.   GI/: Adequate urine output. No BM reported.   Nutrition: NPO.   Activity: Independent.   Skin: Redness under J tube.  Lines: Right chest port LR@75ml/hr. G tube (right) J tube (left)  Events this shift: Continue with pain control. Patient to go to [facility] around 1800. Patient still needs stool sample.      Plan: Continue with plan of care.     Goal Outcome Evaluation:      Plan of Care Reviewed With: patient    Overall Patient Progress: no changeOverall Patient Progress: no change    Outcome Evaluation: Continue with plan of care.      "

## 2023-06-05 NOTE — PLAN OF CARE
SHIFT: [id number]    EVENTS: NPO, abd US completed. ID & ID consult done. Plan for MRCP today, MRI check list done. K mag & phos replaced, rechecks in AM. Nausea partially managed w/ Q6 phenergan. Pain partially managed w/ PO oxy. Tele discontinued     Temp: 97.8  F (36.6  C) Temp src: Oral BP: 126/68 Pulse: 81   Resp: 16 SpO2: 97 % O2 Device: None (Room air)       NEURO: AOx4  PAIN: C/o headache & joint pain partially managed w/ PO oxy, has IV dilaudid & robaxin available   CARD: Tele reading NSR.   RESP: Stable on RA, continuous O2 monitoring in place   GI/: NPO. TPN/TF remain on hold. Nausea partially managed w/ phenergan, has compazine available. No BM this shift, need stool sample. Voiding spontaneously   MUSC: Ind  SKIN: Redness (& green drainage) under LLQ J tube, G & J tube dressings changed. Scopolamine patch behind L ear replaced    LDAs: R port infusing LR @75. LLQ J tube clamped, used for liquid meds & TF. RUQ G tube venting intermittently, use for crushed meds     PLAN: MRCP hopefully this evening. Cont infectious w/u, pain & nausea control. Begin TF after off NPO status      Goal Outcome Evaluation:  Plan of Care Reviewed With: patient  Overall Patient Progress: no change  Outcome Evaluation: ID consult completed. NPO. Abdo US done. Orders for MRCP & EKG, needs to be done. Cont w/ partially managed nausea & pain control. Mag K & phos replaced

## 2023-06-05 NOTE — CONSULTS
[facility]  Infectious Disease Consult Note - New Patient     Patient:  Dinora Mcghee, Date of birth 1965, Medical record number [id number]  Date of Visit:  06/05/2023  Consult requested by Dr. Luis Rubalcava for evaluation of H/o islet cell transplant. Admitted with fevers. Concern for possible line infection vs tick born illness. Please assist with antibiotics. Thank you!    Recommendations:   1. Continue meropenem 1g IV q8hrs for now while awaiting MRCP, infuse through port  2. Will attempt to salvage port-a-cath, no need to remove at this time. May consider lock therapy once final regimen is known  3. Do not place PICC.  4. Repeat blood cultures on 6/6  5. Awaiting MRCP        Thank you very much for this consultation. Transplant Infectious Disease will continue to follow with you.    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    1. Klebsiella pneumoniae bacteremia  2. Port-a-cath in place   3. TPN use  Blood cultures on arrival (6/4) positive for GNR in 4/4 bottles, K.pneumoniae by verigene. Has been started on meropenem. Repeat blood cultures pending on 6/5. Possible sources most likely GI or port-a-cath, recently started TPN, recent diarrhea (possible GI source), elevated LFTs though US without bile duct dilatation on limited view GI with concern for cholangitis. No respiratory or urinary symptoms. Plan for 14 days of therapy from date of blood culture clearance. Will continue  meropenem for now while awaiting further work up of biliary tree; anticipate narrowing in next 24-48hrs.    4. Elevated LFT  Recently started TPN. elevated LFTs though US without bile duct dilatation on limited view. GI consulted, planning for MRCP to further assess for cholangitis.     5. Ticks found on pet dog  Lyme screen negative, anaplsama/ehrlichia and babesia PCRs pending. Bacteremia is most likely cause of symptoms.          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    Symptoms started on Friday 6/2 with fever and diffuse body aches. Associated with chills, nausea, vomiting/dry heaves at home, and diarrhea x4 yesterday. Symptoms have worsened since initial onset causing her to go to ED. Today, fevers improved but ongoing nausea as well as diffuse bilateral throbbing headache. Feels different from baseline migraines. Port has been functioning well, some pruritis that she feels is related to adhesive on the dressing. No redness, swelling, or drainage. No focal arthralgia, redness or swelling. See remainder of ROS below.        Transplants:  12/28/2016 (Islet), Postoperative day:  2350.  Coordinator Alonzo Jolly    Review of Systems: Remaining systems all reviewed and negative.  CONSTITUTIONAL:  +fevers, malaise, diffuse body aches  EYES: negative for icterus or acute vision changes, floaters, or diplopia  ENT:  negative for sinus pain, sore throat  RESPIRATORY:  negative for cough, sputum or dyspnea  CARDIOVASCULAR:  +swollen toes. Negative for  chest pain, palpitations  GASTROINTESTINAL:  +nausea. Vomiting at home. Diarrhea x4 yesterday. Some leaking at G-tube site. No redness or irritation at tube sites.  GENITOURINARY:  negative for dysuria, frequency, urgency, flank pain  MUSCULOSKELETAL: Diffuse body aches. Negative for focal arthralgia, joint swelling or redness. Neck is sore, mostly on the lateral parts and pain is reproducible with palpation.  INTEGUMENT:  negative for rash, pruritis, open wounds. Port site is nonerythematous, nonindurated. No peripheral stigmata of endocarditis.  NEURO:  +bilateral frontotemoral headache that goes to top of head.     Past Medical History:   Diagnosis Date     Allergic rhinitis, cause unspecified      Allergy to other foods     strawberries, apples, celeries, alice, watermelon     Arthritis     left knee     Choledocholithiasis     long after cholecystectomy     Chronic abdominal pain      Chronic constipation      Chronic nausea      Chronic pancreatitis (H)      Degeneration of lumbar or lumbosacral intervertebral disc      Esophageal reflux     w/ hiatal hernia     Gastroparesis      Hiatal hernia      History of pituitary adenoma     s/p resection     Hypothyroidism      Migraines      Mild hyperlipidemia      On tube feeding diet     presence of GJ tube     Pancreatic disease     PD stricture, 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 endometriosis growth     APPENDECTOMY        SECTION       COLONOSCOPY  2014     ENDOSCOPIC INSERTION TUBE GASTROSTOMY N/A 2021    Procedure: Gastrojejonostomy placement with jejunopexy, PEG tube exchange;  Surgeon: Zackery Montoya MD;  Location:  OR      ENDOSCOPIC RETROGRADE CHOLANGIOPANCREATOGRAM N/A 6/2/2015    Procedure: ENDOSCOPIC RETROGRADE CHOLANGIOPANCREATOGRAM;  Surgeon: Mandeep Park MD;  [facility]     ENDOSCOPIC RETROGRADE CHOLANGIOPANCREATOGRAM N/A 2/9/2016    Procedure: COMBINED ENDOSCOPIC RETROGRADE CHOLANGIOPANCREATOGRAPHY, PLACE TUBE/STENT;  Surgeon: Mandeep Park MD;  [facility]     ENDOSCOPIC RETROGRADE CHOLANGIOPANCREATOGRAM N/A 3/17/2016    Procedure: COMBINED ENDOSCOPIC RETROGRADE CHOLANGIOPANCREATOGRAPHY, REMOVE FOREIGN BODY OR STENT/TUBE;  Surgeon: Mandeep Park MD;  [facility]     ENDOSCOPIC RETROGRADE CHOLANGIOPANCREATOGRAM N/A 8/2/2016    Procedure: COMBINED ENDOSCOPIC RETROGRADE CHOLANGIOPANCREATOGRAPHY, PLACE TUBE/STENT;  Surgeon: Mandeep Park MD;  [facility]     ENDOSCOPIC RETROGRADE CHOLANGIOPANCREATOGRAM N/A 8/26/2016    Procedure: COMBINED ENDOSCOPIC RETROGRADE CHOLANGIOPANCREATOGRAPHY, REMOVE FOREIGN BODY OR STENT/TUBE;  Surgeon: Mandeep Park MD;  [facility]     ENDOSCOPIC ULTRASOUND UPPER GASTROINTESTINAL TRACT (GI) N/A 10/3/2016    Procedure: ENDOSCOPIC ULTRASOUND, ESOPHAGOSCOPY / UPPER GASTROINTESTINAL TRACT (GI);  Surgeon: Guru Jose Rodas MD;  [facility]     ENTEROSCOPY SMALL BOWEL N/A 2/3/2022    Procedure: ENTEROSCOPY with possible fistula closure;  Surgeon: Francisco Dodson MD;  Location:  GI     ESOPHAGOSCOPY, GASTROSCOPY, DUODENOSCOPY (EGD), COMBINED N/A 6/24/2015    Procedure: COMBINED ESOPHAGOSCOPY, GASTROSCOPY, DUODENOSCOPY (EGD), REMOVE FOREIGN BODY;  Surgeon: Mandeep Park MD;  [facility]     ESOPHAGOSCOPY, GASTROSCOPY, DUODENOSCOPY (EGD), COMBINED N/A 10/25/2015    Procedure: COMBINED ESOPHAGOSCOPY, GASTROSCOPY, DUODENOSCOPY (EGD);  Surgeon: Sammy Amaro MD;  Location:  GI     ESOPHAGOSCOPY, GASTROSCOPY, DUODENOSCOPY (EGD), COMBINED N/A 10/25/2015    Procedure: COMBINED ESOPHAGOSCOPY,  GASTROSCOPY, DUODENOSCOPY (EGD), BIOPSY SINGLE OR MULTIPLE;  Surgeon: Sammy Amaro MD;  [facility]     ESOPHAGOSCOPY, GASTROSCOPY, DUODENOSCOPY (EGD), COMBINED N/A 1/31/2023    Procedure: ESOPHAGOGASTRODUODENOSCOPY (EGD) with peg replacement ;  Surgeon: Mandeep Park MD;  [facility]     ESOPHAGOSCOPY, GASTROSCOPY, DUODENOSCOPY (EGD), DILATATION, COMBINED       EXCISE LESION TRUNK N/A 4/17/2017    Procedure: EXCISE LESION TRUNK;  Removal of Abdominal Foreign Body;  Surgeon: Nestor Phoenix MD;  [facility]     HC ESOPH/GAS REFLUX TEST W NASAL IMPED >1 HR N/A 11/19/2015    Procedure: ESOPHAGEAL IMPEDENCE FUNCTION TEST WITH 24 HOUR PH GREATER THAN 1 HOUR;  Surgeon: Thiago Apple MD;  [facility]     HC UGI ENDOSCOPY DIAG W BIOPSY  9/17/08     HC UGI ENDOSCOPY DIAG W BIOPSY  9/27/12     HC UGI ENDOSCOPY W ESOPHAGEAL DILATION BALLOON <30MM  9/17/08     HC UGI ENDOSCOPY W EUS N/A 5/5/2015    Procedure: COMBINED ENDOSCOPIC ULTRASOUND, ESOPHAGOSCOPY, GASTROSCOPY, DUODENOSCOPY (EGD);  Surgeon: Wm Dueñas MD;  [facility]     HC WRIST ARTHROSCOP,RELEASE XVERS LIG Bilateral 12/17/08     INJECT TRANSVERSUS ABDOMINIS PLANE (TAP) BLOCK BILATERAL Left 9/22/2016    Procedure: INJECT TRANSVERSUS ABDOMINIS PLANE (TAP) BLOCK BILATERAL;  Surgeon: Dickson Corrigan MD;  [facility]     INJECT TRIGGER POINT Bilateral 9/8/2022    Procedure: Abdominal trigger point injection with ultrasound;  Surgeon: Monika Mahajan MD;  [facility]     INJECT TRIGGER POINT SINGLE / MULTIPLE 3 OR MORE MUSCLES Right 11/15/2022    Procedure: Trigger point injections right abdomen with ultrasound guidance;  Surgeon: Monika Mahajan MD;  Location: [facility] OR     IR CHEST PORT PLACEMENT < 5 YRS OF AGE  6/10/2022     laparoscopic pineda  1995     LAPAROSCOPIC HERNIORRHAPHY INCISIONAL N/A 8/23/2018    Procedure: LAPAROSCOPIC HERNIORRHAPHY INCISIONAL;  Laparoscopic Incisional Hernia Repair with Symbotex  Mesh Implant;  Surgeon: Nestor Phoenix MD;  [facility]     LAPAROSCOPIC PANCREATECTOMY, TRANSPLANT AUTO ISLET CELL N/A 12/28/2016    Procedure: LAPAROSCOPIC PANCREATECTOMY, TRANSPLANT AUTO ISLET CELL;  Surgeon: Nestor Phoenix MD;  [facility]     LAPAROTOMY EXPLORATORY N/A 1/31/2023    Procedure: Exploratory Laparotomy, lysis of adhesions, Perforated J-Junostomy Resection, Replace J-Junostomy site;  Surgeon: Elver Bauer MD;  [facility]     LAPAROTOMY, LYSIS ADHESIONS, COMBINED N/A 1/31/2023    Procedure: Dilatation of jejunostomy feeding tube, track with placement of jejunostomy tube with fluoroscopy;  Surgeon: Elver Bauer MD;  [facility]     REMOVE AND REPLACE BREAST IMPLANT PROSTHESIS N/A 12/30/2022    Procedure: Exploratory Laparotomy, lysis of adhesions, small bowel resection. Placement of gastric jejunostomy for enteral feeding.;  Surgeon: Elver Bauer MD;  [facility]     REMOVE GASTROSTOMY TUBE ADULT N/A 1/28/2022    Procedure: REMOVAL, GASTROSTOMY TUBE, ADULT;  Surgeon: Mandeep Park MD;  [facility]     REPLACE GASTROJEJUNOSTOMY TUBE, PERCUTANEOUS N/A 9/7/2021    Procedure: GASTROJEJUNOSTOMY TUBE PLACEMENT, PERCUTANEOUS, WITH GASTROPEXY;  Surgeon: Mandeep Park MD;  [facility]     REPLACE GASTROJEJUNOSTOMY TUBE, PERCUTANEOUS N/A 9/22/2021    Procedure: REPLACEMENT, GASTROJEJUNOSTOMY TUBE, PERCUTANEOUS;  Surgeon: Zackery Montoya MD;  [facility]     REPLACE GASTROJEJUNOSTOMY TUBE, PERCUTANEOUS N/A 11/22/2022    Procedure: REPLACEMENT, GASTROJEJUNOSTOMY TUBE, PERCUTANEOUS;  Surgeon: Mandeep Park MD;  [facility]     REPLACE GASTROJEJUNOSTOMY TUBE, PERCUTANEOUS N/A 12/9/2022    Procedure: REPLACEMENT, GASTROJEJUNOSTOMY TUBE, PERCUTANEOUS with ENDOSCOPIC SUTURING.;  Surgeon: Mandeep Park MD;  [facility]     REPLACE JEJUNOSTOMY TUBE, PERCUTANEOUS N/A 9/10/2021    Procedure: UPPER  ENDOSCOPY, REPLACEMENT OF PERCUTANEOUS GASTROJEJUNOSTOMY TUBE, T-TAG GASTROPEXY;  Surgeon: Zackery Montoya MD;  [facility]     REPLACE JEJUNOSTOMY TUBE, PERCUTANEOUS N/A 2021    Procedure: REPLACEMENT, JEJUNOSTOMY TUBE, PERCUTANEOUS;  Surgeon: Mandeep Park MD;  [facility]     REPLACE JEJUNOSTOMY TUBE, PERCUTANEOUS N/A 2023    Procedure: REPLACEMENT, JEJUNOSTOMY TUBE, PERCUTANEOUS;  Surgeon: Mandeep Park MD;  [facility]     transphenoidal pituitary resection       Z  DELIVERY ONLY       Z  DELIVERY ONLY      repeat c section with incidental cystotomy with repair     Z EXCIS PITUITARY,TRANSNASAL/SEPTAL      pituitary tumor removed for diabetes insipidus     Z TOTAL ABDOM HYSTERECTOMY      w/ bilateral salpingoophorectomy        Family History   Problem Relation Age of Onset     Lipids Mother      Hypertension Mother      Thyroid Disease Mother      Depression Mother      Angina Mother      GERD Mother      Skin Cancer Mother      Migraines Mother      Autoimmune Disease Mother      Hyperlipidemia Mother      Mental Illness Mother      Eye Disorder Father         cataract, detached retina     Myocardial Infarction Father 60     Lipids Father      Cerebrovascular Disease Father      Depression Father      Substance Abuse Father      Anesthesia Reaction Father         stroke right after surgery     Cataracts Father      Osteoarthritis Father      Ulcerative Colitis Father      Autoimmune Disease Father      Heart Disease Father      Hyperlipidemia Father      Mental Illness Father      Interpersonal Violence Sister      Coronary Artery Disease Sister         angioplasty     GERD Sister      Substance Abuse Sister      Depression Sister      Thyroid Disease Sister      Eye Disorder Maternal Grandmother         cataract     Thyroid Disease Maternal Grandmother      Diabetes Maternal Grandfather      Eye Disorder Paternal  Grandmother         cataract     Diabetes Paternal Grandmother      Eye Disorder Paternal Grandfather         cataract     Diabetes Paternal Grandfather      Substance Abuse Paternal Grandfather      Eye Disorder Son         ptosis     Depression Son      Anxiety Disorder Son      Heart Disease Paternal Aunt      Diabetes Paternal Aunt      Diabetes Paternal Uncle      Heart Disease Paternal Uncle      Depression Nephew      Anxiety Disorder Nephew      Thyroid Disease Nephew      Diabetes Type 2  Cousin         paternal cousin     Autoimmune Disease Cousin      Autoimmune Disease Sister      Depression Sister      Mental Illness Sister      Substance Abuse Sister      Thyroid Disease Sister      Depression Son      Mental Illness Son      Thyroid Disease Nephew        Social History     Social History Narrative     with 3 children and a dog.  No smoking, etoh or drug use.  Worked as a  for [employer] in [employer] in the past.  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     Social History     Tobacco Use     Smoking status: Former     Packs/day: 0.50     Years: 6.00     Pack years: 3.00     Types: Cigarettes     Start date: 1985     Quit date: 1992     Years since quittin.4     Tobacco comments:     no 2nd hand   Substance Use Topics     Alcohol use: Not Currently     Alcohol/week: 3.0 - 6.0 standard drinks of alcohol     Types: 1 - 2 Glasses of wine, 1 - 2 Cans of beer, 1 - 2 Shots of liquor per week     Comment: none since IGG     Drug use: No       Immunization History   Administered Date(s) Administered     COVID-19 Bivalent 18+ (Moderna) 2022     COVID-19 Monovalent 18+ (Moderna) 2021, 2021, 2021, 2022     Flu, Unspecified 2011, 2013, 11/15/2014, 10/23/2015, 10/21/2016, 10/20/2017, 2019     HIB (PRP-T) 2016     Influenza (IIV3) PF 2003, 2011, 11/15/2014, 10/21/2016     Influenza  (intradermal) 11/01/2003, 09/29/2011     Influenza Vaccine 50-64 or 18-64 w/egg allergy (Flublok) 09/16/2021     Influenza Vaccine >6 months (Alfuria,Fluzone) 10/24/2015, 10/21/2016, 10/20/2017, 02/01/2019, 09/17/2019, 11/30/2020     Influenza Vaccine, 6+MO IM (QUADRIVALENT W/PRESERVATIVES) 11/04/2013     Meningococcal ACWY (Menactra ) 12/01/2016     Meningococcal B (Bexsero ) 12/09/2016     Pneumo Conj 13-V (2010&after) 09/06/2017     Pneumococcal 23 valent 12/01/2016     TD,PF 7+ (Tenivac) 07/09/1998     TDAP Vaccine (Adacel) 01/07/2008     TDAP Vaccine (Boostrix) 07/31/2018     Td (Adult), Adsorbed 01/08/1998, 07/09/1998     Tdap (Adult) Unspecified Formulation 01/08/1998     Zoster recombinant adjuvanted (SHINGRIX) 09/13/2019, 01/31/2020       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     Incisional hernia, without obstruction or gangrene     Adhesive capsulitis of shoulder, unspecified laterality     S/P hernia repair     Headache, chronic migraine without aura     Chronic pain syndrome     Iron deficiency anemia     Hypoglycemia     Adult failure to thrive     Abdominal pain, generalized     Gastrostomy tube obstruction (H)     Intra-abdominal abscess (H)     Postprocedural intraabdominal abscess     Acquired absence of spleen     Depressive disorder     Diabetes insipidus (H)     Other specified abnormal immunological findings in serum      Poisoning by drug     Sphincter of Oddi dysfunction     Type 1 diabetes mellitus without complication (H)     Myofascial pain     Feeding intolerance     Acute postoperative abdominal pain     Major depression, single episode     History of food intolerance     Malnutrition, unspecified type (H)     Nausea and vomiting, unspecified vomiting type     On total parenteral nutrition (TPN)     Fever, unspecified fever cause     Chronic pancreatitis, unspecified pancreatitis type (H)            Current Medications & Allergies:       amitriptyline  40 mg Oral At Bedtime     cetirizine  10 mg Per G Tube Daily     famotidine  20 mg Per J Tube At Bedtime     levothyroxine  137 mcg Per Feeding Tube QAM AC     lipase-protease-amylase  2-4 capsule Oral TID w/meals     magnesium sulfate  2 g Intravenous Once     meropenem  1 g Intravenous Q8H     montelukast  10 mg Per G Tube At Bedtime     pantoprazole  40 mg Intravenous Daily with breakfast     potassium & sodium phosphates  1 packet Oral or Feeding Tube Q4H     potassium chloride  20 mEq Oral or Feeding Tube Once     prucalopride succinate  2 mg Oral Daily     scopolamine  1 patch Transdermal Q72H    And     scopolamine   Transdermal Q8H     sennosides  5 mL Per J Tube BID     sodium chloride (PF)  3 mL Intracatheter Q8H     thiamine  100 mg Per Feeding Tube Daily     vancomycin  1,250 mg Intravenous Q12H       Infusions/Drips:      lactated ringers 75 mL/hr at 06/05/23 0705       Allergies   Allergen Reactions     Apple Juice Anaphylaxis     Corticosteroids Other (See Comments)     All oral, IV and injectable steroids are contraindicated (unless in life threatening situations) in Islet Auto transplant recipients. They can cause irreversible loss of islet cell function. Please contact patient's transplant care coordinator ADI Gaffney RN at [phone]/pager [phone] and/or endocrinologist prior to administration.       Depakote [Divalproex Sodium] Other (See Comments)  "    Chest pain     Zithromax [Azithromycin Dihydrate] Anaphylaxis     Noted in 4/7/08 ER     Bromfenac      Other reaction(s): Headache     Codeine      Other reaction(s): Hallucinations     Darvocet [Propoxyphene N-Apap] Itching     Morphine Nausea and Vomiting and Rash     Nalbuphine Hcl Rash     RASH :nubaine     Zosyn [Piperacillin-Tazobactam In Dex] Rash     Possible allergy, did have a diffuse rash that seemed drug related but could have also been related to soap in the hospital.      Bactrim [Sulfamethoxazole W-Trimethoprim] Other (See Comments) and Nausea and Vomiting     Severely low liver function.     Statins Other (See Comments)     Previous liver issues, risks vs benefits felt to not warrant statin.  Discussed Oct 2022 visit     Tape [Adhesive Tape] Blisters     Tramadol      Zofran [Ondansetron] Other (See Comments)     migraine     Flagyl [Metronidazole] Hives and Rash            Physical Exam:     Patient Vitals for the past 24 hrs:   BP Temp Temp src Pulse Resp SpO2 Height Weight   06/05/23 0831 126/70 97.9  F (36.6  C) Oral 77 16 96 % -- --   06/05/23 0537 134/72 97.8  F (36.6  C) Oral 79 16 97 % -- --   06/05/23 0216 122/69 97.7  F (36.5  C) Oral 72 16 95 % -- --   06/05/23 0152 -- 97.9  F (36.6  C) Oral -- -- -- -- --   06/04/23 2355 -- -- -- 74 16 95 % -- --   06/04/23 2209 128/75 -- -- 75 18 96 % -- --   06/04/23 2013 114/67 98.5  F (36.9  C) Oral 75 18 95 % -- --   06/04/23 1627 113/68 98.8  F (37.1  C) Oral 98 18 93 % -- --   06/04/23 1352 127/71 -- -- 112 16 92 % 1.727 m (5' 8\") 61.2 kg (135 lb)   06/04/23 1334 129/71 100.3  F (37.9  C) Oral (!) 123 20 94 % -- --     Ranges for vital signs:  Temp:  [97.7  F (36.5  C)-100.3  F (37.9  C)] 97.9  F (36.6  C)  Pulse:  [] 77  Resp:  [16-20] 16  BP: (113-134)/(67-75) 126/70  SpO2:  [92 %-97 %] 96 %  Vitals:    06/04/23 1352   Weight: 61.2 kg (135 lb)       Physical Examination:  GENERAL:  Awake, alert, supine in bed. Appears uncomfortable, " holding emesis bag.  HEAD:  Head is normocephalic, atraumatic   EYES:  Eyes have anicteric sclerae without conjunctival injection or discharge.   ENT:  Oropharynx is moist without exudates or ulcers. +tenderness over frontal sinuses bilaterally.  NECK:  Supple. No cervical lymphadenopathy. +reproducible tenderness with palpation of muscles. No C-spine tenderness.  LUNGS:  Clear to auscultation bilaterally. No accessory muscle use. Not on oxygen.   CARDIOVASCULAR:  Regular rate and rhythm with no murmurs, gallops or rubs.   ABDOMEN:  Normal bowel sounds, soft, moderately tender throughout. PEG tube with small amount of drainage on dressing, J tube without drainage. Neither site is erythematous.  SKIN:  No acute rashes.  R chest port-a-cath place without any surrounding erythema or exudate.  EXTREMITIES: no joint swelling or erythema   NEUROLOGIC:  Alert. Oriented. Grossly nonfocal. Active x4 extremities. Speech clear. No tremor.            Laboratory Data:     Metabolic Studies       Recent Labs   Lab Test 06/05/23  0834 06/05/23  0703 06/05/23  0702 06/04/23  2231 06/04/23  1553 06/04/23  1348 06/04/23  1343 05/12/23  0743 02/17/22  1244 02/17/22  1115   NA  --  139  --   --   --  140  --   --    < > 138   POTASSIUM  --  3.8  --   --   --  3.5  --   --    < > 4.0   CHLORIDE  --  106  --   --   --  105  --   --    < > 102   CO2  --  24  --   --   --  21*  --   --    < > 29   ANIONGAP  --  9  --   --   --  14  --   --    < > 7   BUN  --  5.9*  --   --   --  11.0  --   --    < > 15   CR  --  0.41*  --   --   --  0.50*  --   --    < > 0.71   GFRESTIMATED  --  >90  --   --   --  >90  --   --    < > >90   * 186*  --    < >  --  192*  --   --    < > 132*  132*   A1C  --   --   --   --   --   --   --   --   --  5.4   KASSI  --  8.3*  --   --   --  8.9  --   --    < > 9.6   PHOS  --   --  2.2*  --   --  1.8*  --   --    < >  --    MAG  --  1.9  --    < >  --  1.5*  --   --    < >  --    LACT  --   --   --   --  1.3  --   2.7*  --    < >  --    PCAL  --  5.08*  --   --   --  6.33*  --    < >  --   --     < > = values in this interval not displayed.       Hepatic Studies    Recent Labs   Lab Test 06/05/23  0703 06/04/23  1348 05/10/23  0719   BILITOTAL 0.4 0.7 0.5   DBIL  --   --  <0.20   ALKPHOS 222* 281* 125*   PROTTOTAL 5.7* 6.2* 7.4   ALBUMIN 3.2* 3.6 4.3   * 305* 43*   * 434* 67*       Hematology Studies      Recent Labs   Lab Test 06/05/23  0702 06/04/23  1348 05/09/23  0724 05/09/23  0059 02/03/23  0716 02/02/23  0649 07/13/21  1731 12/17/20  0739 09/14/20  1529   WBC 14.4* 8.8 6.0 6.0 11.0 14.2*   < > 5.0 6.2   ANEU  --   --   --   --   --   --   --  1.5* 2.6   ALYM  --   --   --   --   --   --   --  2.7 2.6   GABRIELE  --   --   --   --   --   --   --  0.5 0.7   AEOS  --   --   --   --   --   --   --  0.3 0.2   HGB 10.9* 11.8 12.6 12.1 11.7 12.5   < > 13.5 14.3   HCT 34.5* 37.0 39.9 38.1 36.3 38.2   < > 41.4 42.9    164 420 425 320 324   < > 363 353    < > = values in this interval not displayed.       Arterial Blood Gas Testing    Recent Labs   Lab Test 12/28/16  1600 12/28/16  1358 12/28/16  1205 12/28/16  1106 12/28/16  1043 12/28/16  0915   PH  --  7.49* 7.48* 7.47* 7.31* 7.45   PCO2  --  35 36 39 58* 41   PO2  --  203* 261* 269* 267* 250*   HCO3  --  26 27 28 29* 28   O2PER 60 60.0 55.0 60.0 60.0 60        Medication levels  No lab results found.    Invalid input(s): AMIK    No results found for: ACD4    Inflammatory Markers    Recent Labs   Lab Test 09/16/21  1308 12/29/16  0620 06/06/15  1903   SED 10  --   --    CRP  --  90.0* 71.0*       Immune Globulin Studies     Recent Labs   Lab Test 08/05/21  1544 07/20/21  0943 12/17/20  0741 07/30/20  0903 01/31/20  1021 09/17/19  1722 01/31/17  1310 12/19/16  0800    886 924 956 918 953   < >  --    IGA  --   --   --   --   --   --   --  329   IGG1 437  --  424 399 460 390   < >  --    IGG2 359  --  331 338 403 349   < >  --    IGG3 62  --  64 65 71  64   < >  --    IGG4 91*  --  84 85 87* 86   < >  --     < > = values in this interval not displayed.       Pancreatitis testing    Recent Labs   Lab Test 06/04/23  1348 05/12/23  0018 05/09/23  0059 03/06/23  1548 12/03/21  1227 09/18/21  1936 04/04/17  0757 01/23/17  0813 01/22/17  1617 01/09/17  1127 01/02/17  0701   AMYLASE  --   --   --   --   --   --   --  45 54 28* 18*   LIPASE 6*  --  5*  --  <10* 12*   < > 35* 32* 36* 32*   TRIG  --  107  --    < >  --   --    < >  --   --   --   --     < > = values in this interval not displayed.       Gout Labs    No lab results found.    Clotting Studies    Recent Labs   Lab Test 06/04/23  1348 05/10/23  0719 06/10/22  0855 12/04/21  0659 04/04/17  0757 01/07/17  0745   INR 1.15 1.04 1.03 1.05   < >  --    PTT  --   --   --   --   --  29    < > = values in this interval not displayed.         Markers  No lab results found.    Invalid input(s): FETOPROTEIN, SERUM, AFP    Autoimmune Testing    Recent Labs   Lab Test 04/10/17  1019 04/10/17  1018   RHF  --  <20   K9EEAYL  --  98   B8BNSCK  --  17   SSAIGG <0.2  Negative   Antibody index (AI) values reflect qualitative changes in antibody   concentration that cannot be directly associated with clinical condition or   disease state.    --    SSBIGG <0.2  Negative   Antibody index (AI) values reflect qualitative changes in antibody   concentration that cannot be directly associated with clinical condition or   disease state.    --        Thyroid Studies     Recent Labs   Lab Test 06/05/23  0703 03/06/23  1548 09/16/21  1308 09/14/20  1529 02/19/19  1651 03/23/18  0958 12/20/16  1121   TSH 0.55 0.99 0.67 2.41 1.83 0.74 0.13*   T4  --   --   --   --   --  1.13 1.17       Urine Studies     Recent Labs   Lab Test 06/04/23  1624 05/10/23  1812 12/19/22  0700 12/03/21  1412 09/19/21  1808 09/08/21  0314   URINEPH 6.0 7.0 6.0 7.0 5.0 5.5   NITRITE Negative Negative Negative Negative Negative Negative   LEUKEST Negative Large*  Negative Negative Moderate* Trace*   WBCU 1 28*  --  <1 10* 3       CSF testing   No lab results found.    Invalid input(s): CADAM, EVPCR, ENTPCR, ENTEROVIRUS    Body fluid stats    Recent Labs   Lab Test 12/28/16  1518   GS No organisms seen  No WBC's seen         Microbiology:  Fungal testing  No lab results found.    Invalid input(s): HIFUN, FUNGL    Culture   Date Value Ref Range Status   06/04/2023 Positive on the 1st day of incubation (A)  Preliminary   06/04/2023 Gram negative bacilli (AA)  Preliminary     Comment:     2 of 2 bottlesSusceptibilities done on previous cultures   06/04/2023 Positive on the 1st day of incubation (A)  Preliminary   06/04/2023 Gram negative bacilli (AA)  Preliminary     Comment:     2 of 2 bottles   05/10/2023 No Growth  Final   05/04/2023 Candida albicans (A)  Final   12/05/2021 2+ Klebsiella pneumoniae (A)  Final   12/05/2021 2+ Klebsiella pneumoniae (A)  Final   12/05/2021 2+ Citrobacter freundii complex (A)  Final   12/05/2021 3+ Streptococcus anginosus (A)  Final     Comment:     This organism is susceptible to ampicillin, penicillin, vancomycin and the cephalosporins. If treatment is required and your patient is allergic to penicillin, contact the microbiology lab within 5 days to request susceptibility testing.   09/23/2021 No Growth  Final   09/23/2021 No Growth  Final   09/19/2021 >100,000 CFU/mL Mixture of urogenital andry  Final     Culture Micro   Date Value Ref Range Status   12/28/2016 No growth  Final   12/28/2016 No growth after 14 days  Final   12/28/2016 No growth  Final   12/28/2016 No growth after 14 days  Final   12/02/2016 No growth  Final   06/03/2015 No growth  Final   06/03/2015 No growth  Final   04/04/2013 No Beta Streptococcus isolated  Final   (    Last check of C difficile  C Diff Toxin B PCR   Date Value Ref Range Status   08/08/2016  NEG Final    Negative  Negative: Clostridium difficile target DNA sequences NOT detected, presumed   negative for  Clostridium difficile toxin B or the number of bacteria present   may be below the limit of detection for the test.   FDA approved assay performed using [employer] GeneXpert real-time PCR.   A negative result does not exclude actual disease due to Clostridium difficile   and may be due to improper collection, handling and storage of the specimen or   the number of organisms in the specimen is below the detection limit of the   assay.         Infection Studies to assess Diarrhea No lab results found.    Virology:  Coronavirus-19 testing    Recent Labs   Lab Test 06/07/22  1625 01/31/22  0924 01/27/22  1352 12/02/21  1355 11/27/21  1848 10/18/21  1525 09/16/21  1628 09/10/21  1757   [id number]  --   --   --   --  Negative Negative  --  Negative   COVIDPCREXT Undetected Undetected Undetected   < >  --   --    < >  --    SOUREXT Swab, Nasopharynx Swab, Nasopharynx Swab, Nasopharynx   < >  --   --    < >  --     < > = values in this interval not displayed.       Respiratory virus testing    Recent Labs   Lab Test 02/26/20  1205   AFLU Positive*   BFLU Negative       No results found for: CMVIGG, CMVM, CMVIM, CMIG, CMVG, CMIGG, CMIM, CMVIGM, CMLTX, HSVG1, HSVG2, HSVTP1, PU0319, HS12M, HS12GR, HS1GR, HS2GR, HSIM, HSIG, HSIGR, HSVIGMAB, HSVG1, VZVIGG, VARICZOSAB  No results found for: EBVCAG, EBIG2, EBIGM, EBVIGG, EBIGG, EBVAGN, DP2748, TOXG  No results found for: H1IGG, H2IGG, EBVCAM    CMV viral loads  No lab results found.    CMV resistance testing  No lab results found.    No results found for: H6RES    No results found for: EBRES    No results found for: 47904, BKRES    Parvovirus Testing  No lab results found.    Invalid input(s): PRVRES    Adenovirus Testing  No lab results found.    Invalid input(s): ADENAB, ADENOVIRUS, ADQT    Hepatitis B Testing     Recent Labs   Lab Test 06/20/17  0929 12/19/16  0800   AUSAB  --  21.34*   HBCAB Nonreactive  --    HEPBANG  --  Nonreactive        Hepatitis C Antibody   Date Value Ref  Range Status   12/19/2016  NR Final    Nonreactive   Assay performance characteristics have not been established for newborns,   infants, and children     11/29/2016  NR Final    Nonreactive   Assay performance characteristics have not been established for newborns,   infants, and children       Cryoglobulin   Date Value Ref Range Status   04/10/2017  NEG % Final    Negative   No cryoprecipitate observed   This test was developed and its performance characteristics determined by the   [facility],  [facility]. It has   not been cleared or approved by the FDA. The laboratory is regulated under CLIA   as qualified to perform high-complexity testing. This test is used for clinical   purposes. It should not be regarded as investigational or for research.         Imaging:  Recent Results (from the past 48 hour(s))   CT Chest Abdomen Pelvis Angiogram with IV Contrast    Narrative    EXAM: CT CHEST ABDOMEN PELVIS ANGIOGRAM WITH IV CONTRAST   Including 3-D image postprocessing.    COMPARISON: 11/18/2020 CT abdomen    FINDINGS:     Arterial: Normal caliber thoracic and abdominal aorta. No significant atherosclerotic plaque. No  acute aortic syndrome. Single bilateral renal arteries are patent and normal in caliber. 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 Celiac artery, SMA, and LAZARUS are patent. Imaged  iliofemoral arteries are patent and normal in caliber.    CHEST: Right IJ Port-A-Cath tip at the cavoatrial junction. Heart size is normal. No pericardial  effusion. Normal caliber pulmonary artery. No central pulmonary embolism. No abnormally enlarged  lymph nodes. Atrophic thyroid gland. Central tracheobronchial tree is clear. Dependent subsegmental  atelectasis. Calcified left lower lobe granuloma. No pleural effusion or acute infection. 3 mm right  upper lobe nodule on series 3 image 103.    Abdomen/pelvis: Symmetric nephrogram. No hydronephrosis or  nephrolithiasis. No hydroureter or  obstructing ureteral stone. Patent bilateral renal veins. No suspicious solid or cystic renal mass.  No hydroureter or obstructing ureteral stone. Normal-appearing urinary bladder. Hysterectomy. No  adnexal mass.    Percutaneous jejunostomy catheter. Additional percutaneous catheter with associated metallic streak  artifact in the retrogastric space status post pancreatectomy and cholecystectomy. No bowel  obstruction. Moderate colonic stool volume suggest constipation. Appendectomy.    No acute or suspicious finding within the liver, or adrenal glands. No ascites or pneumoperitoneum.  No lymphadenopathy.    Bones and soft tissues: Thoracolumbar spondylosis. No acute or aggressive bone or soft tissue  finding.    Impression    1. No acute aortic syndrome. Normal caliber aorta. Major visceral branches are patent and normal in  caliber.  2. No acute finding in the chest, abdomen, or pelvis. Incidental findings are described in the body  of the report.   XR Chest Port 1 View    Narrative    EXAM: XR CHEST PORT 1 VIEW  6/4/2023 5:33 PM     HISTORY:  sepsis, assessing for source       COMPARISON:  9/23/2021    FINDINGS:   AP portable semiupright view of the chest. Right chest wall cydney cath  distal tip in the high SVC. Trachea is midline. Cardiomediastinal  silhouette and pulmonary vasculature are within normal limits. No  focal airspace opacity, pleural effusion or appreciable pneumothorax.    No acute osseous abnormality. Visualized upper abdomen is  unremarkable.        Impression    IMPRESSION:   No acute airspace disease.    I have personally reviewed the examination and initial interpretation  and I agree with the findings.    BOB RIVERA MD         SYSTEM ID:  [id number]

## 2023-06-05 NOTE — PLAN OF CARE
ASSUMED CARES: [id number]  STATUS: Pt admitted d/t fever, body aches, diarrhea and headache. Chronic Panc. Gastroparesis. Migraines. FTT. Transminitis. Uterine tumor s/p hysterectomy.   NEURO: A/o x 4.   VS: VSS on RA.   ACTIVITY: Up ad koffi   PAIN: C/o headache and body aches- PRN Oxy, Robaxin and Dilaudid given per order.   CARDIAC: No C/o CP. Tele+  RESP: No SOB reported.   GI/: Voiding spontaneously. No BM this shift. +Nausea- Phenergan given with some relief.   DIET: Regular. Plans to initiate TF today as well as TPN- Holding TPN currently to R/o line infection. Per home plan- TF and TPN run simultaneously for 14 hours starting at 8p.   SKIN: Intact. J tube site- reddened- cleaned and redressed per pt.   LDA'S: R port infusing LR 75 ml/hr- not given for most of night d/t pt with only 1 access site and need for multiple abx as well as electrolyte replacements. Liquid meds and TF via LUQ J tube. Crushed meds via RUQ G tube. Pt vents G tube as needed- Clamped for 30-45 minutes post med admin.   LABS: Multiple +BC this shift- refer to provider notification notes. BG Q4H (117, 128 & 158). Mg replaced- recheck 2.3. Phos and K+ replaced with rechecks in AM. MRSA swab sent. Stool spec needed. Elevated LFTs. Procal 6.33. CRP 63. Pt with Yandy sensor.   CHANGES THIS SHIFT: Pt admitted this shift. IV abx given per order. +pain and nausea- Medicated. +BC. Orders to notify team if BG > 250. Benadryl given x 1.   POC: Cont with POC. Monitor VS and labs. Plan for ID consult today. Pt will need another IV access- Pt states hard stick and needs PICC team to place PIV. Port most likely to be removed d/t +BC from line. Plan for RUQ US. Plan to restart TF once Nutrition consulted. Call light within reach.

## 2023-06-05 NOTE — PROGRESS NOTES
"/75 (BP Location: Left arm)   Pulse 75   Temp 98.5  F (36.9  C) (Oral)   Resp 18   Ht 1.727 m (5' 8\")   Wt 61.2 kg (135 lb)   SpO2 96%   BMI 20.53 kg/m  RA.Pt was medicated for nausea with compazine and dilaudid for pain in abd and head.Has G/J tube and G vented to bag.Need stool sample to R/O c-diff. Mag and k replaced and next needs Phos replaced.LR at 75 hr and has port.At home has been on TPN but it has not been ordered yet.Up independent .Report called to 5B and ready to go with her belongings.  "

## 2023-06-05 NOTE — CONSULTS
"    GASTROENTEROLOGY CONSULTATION    Date of Admission:  6/4/2023       ASSESSMENT AND RECOMMENDATIONS:   58 year old female with chronic pancreatitis (post ERCP pancreatitis 1996) s/p TPIAT 12/2016 with diabetes post operation, gastroparesis with feeding tube dependent (PEG/J tube) who was admitted with abdominal pain.      # Gram negative bacilli bacteremia, concerns for acute cholangitis  # Elevated liver enzymes, hepatocellular pattern  Presented with fever, worsening abdominal pain. CT without significant pathology (but CTA did not comment on the bile duct). New elevated liver enzymes could be related to the new initiation of TPN, or could be a presentation of cholangitis given accompanied by fever and also with this new gram negative bacilli which is a typical pathogen for cholangitis in the setting of TPIAT. Ultrasound did not show dilated common bile duct, but with limited visualization. Will further assess possible cholangitis/bile duct dilation with MRCP.    # Gastroparesis, PEG and J-tube dependent  # Delayed small bowel transit  # Visceral hypersensitivity  # Protein-calories malnutrition    RECOMMENDATIONS  - Continue amitriptyline for chronic abdominal pain with component of visceral hypersensitivity  - NPO  - MRCP now  - Antibiotics and line management per primary team, now is on meropenem and IV vancomycin  - Agree with holding TPN through the current port line     Gastroenterology follow up recommendations: TBD    Thank you for involving us in this patient's care. Please do not hesitate to contact the GI service with any questions or concerns.     Patient care plan discussed with Dr. Dodson, GI staff physician.    Maren Flores MD  GI fellow          Chief Complaint:   We were asked to evaluate this patient with \"Patient with complex GI history sent by Dr. Park. Abdominal pain, fevers, rising LFTs, concern for possible line infection.\"  History is obtained from the patient and the medical " record.          History of Present Illness:   58 year old female with chronic pancreatitis (post ERCP pancreatitis 1996) s/p TPIAT 12/2016 with diabetes post operation, gastroparesis with feeding tube dependent (PEG/J tube) who was admitted with abdominal pain.      Fever up to 101.7 F since 6/2/23 with chills and body aches. Has chronic abdominal pain which has been worsening.   Diarrhea is stable, apart from yesterday increased to 4 times, from baseline of 1-3 times a day. Tolerates tube feeding at 30 mL/hr.    Patient has complicated GI history as below with most recent with PEG replacement and J tube exchange 5/4 with Dr. Park.  Taking acetaminophen and advil (taking daily), on occasional diludid ~ once a week. Also taking Robaxin and flexeril for pain. Taking amitriptyline 40 mg daily, and prucalopride 1 g daily (half regular dose)     Complicated GI history  - 5/4/23 since PEG tube placement, had multiple balloon broken, so underwent gastrostomy tube replacement. The multiple clips in the antrum were thought to be the cause of balloon breakage. J tube was exchanged from 12 F to 14 F, with balloon dilated to 5 mL.  - 1/31/23 underwent EGD with PEG, and dilation of jejunostomy feeding tube. Abdominal pain post procedure, and found to have erforated jejunum at jejunostomy site into mesentery s/p jejunal resection same day.  - 12/30/22 explore lap with extensive lysis adhesion, small bowel resection, jejunostomy feeding tube placement ([facility], Dr. Good)  -- Complicated GJ ad J tube placement, displacement, pain with tubes  - 9/2021 GJ tube placement  - Late 2020 started having weight lost and post prandial pain.   - 2016 TPIAT for chronic pancreatitis    NSAID use: denies    Prior endoscopy:  PEG replacement 5/4/23 with Dr. Park          Past Medical History:   Reviewed and edited as appropriate  Past Medical History:   Diagnosis Date     Allergic rhinitis, cause unspecified      Allergy to other foods      strawberries, apples, celeries, alice, watermelon     Arthritis     left knee     Choledocholithiasis     long after cholecystectomy     Chronic abdominal pain      Chronic constipation      Chronic nausea      Chronic pancreatitis (H)      Degeneration of lumbar or lumbosacral intervertebral disc      Esophageal reflux     w/ hiatal hernia     Gastroparesis      Hiatal hernia      History of pituitary adenoma     s/p resection     Hypothyroidism      Migraines      Mild hyperlipidemia      On tube feeding diet     presence of GJ tube     Pancreatic disease     PD stricture, 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 endometriosis growth     APPENDECTOMY        SECTION       COLONOSCOPY       ENDOSCOPIC INSERTION TUBE GASTROSTOMY N/A 2021    Procedure: Gastrojejonostomy placement with jejunopexy, PEG tube exchange;  Surgeon: Zackery Montoya MD;  Location:  OR     ENDOSCOPIC RETROGRADE CHOLANGIOPANCREATOGRAM N/A 2015    Procedure: ENDOSCOPIC RETROGRADE CHOLANGIOPANCREATOGRAM;  Surgeon: Mandeep Park MD;  Location:  OR     ENDOSCOPIC RETROGRADE CHOLANGIOPANCREATOGRAM N/A 2016    Procedure: COMBINED ENDOSCOPIC RETROGRADE CHOLANGIOPANCREATOGRAPHY, PLACE TUBE/STENT;  Surgeon: Mandeep Park MD;  Location:  OR     ENDOSCOPIC RETROGRADE CHOLANGIOPANCREATOGRAM N/A 3/17/2016    Procedure: COMBINED ENDOSCOPIC RETROGRADE CHOLANGIOPANCREATOGRAPHY, REMOVE FOREIGN BODY OR STENT/TUBE;  Surgeon: Mandeep Park MD;  Location:  OR     ENDOSCOPIC RETROGRADE CHOLANGIOPANCREATOGRAM N/A 2016     Procedure: COMBINED ENDOSCOPIC RETROGRADE CHOLANGIOPANCREATOGRAPHY, PLACE TUBE/STENT;  Surgeon: Mandeep Park MD;  [facility]     ENDOSCOPIC RETROGRADE CHOLANGIOPANCREATOGRAM N/A 8/26/2016    Procedure: COMBINED ENDOSCOPIC RETROGRADE CHOLANGIOPANCREATOGRAPHY, REMOVE FOREIGN BODY OR STENT/TUBE;  Surgeon: Mandeep Park MD;  [facility]     ENDOSCOPIC ULTRASOUND UPPER GASTROINTESTINAL TRACT (GI) N/A 10/3/2016    Procedure: ENDOSCOPIC ULTRASOUND, ESOPHAGOSCOPY / UPPER GASTROINTESTINAL TRACT (GI);  Surgeon: Guru Jose Rodas MD;  [facility]: [facility]     ENTEROSCOPY SMALL BOWEL N/A 2/3/2022    Procedure: ENTEROSCOPY with possible fistula closure;  Surgeon: Francisco Dodson MD;  Location:  GI     ESOPHAGOSCOPY, GASTROSCOPY, DUODENOSCOPY (EGD), COMBINED N/A 6/24/2015    Procedure: COMBINED ESOPHAGOSCOPY, GASTROSCOPY, DUODENOSCOPY (EGD), REMOVE FOREIGN BODY;  Surgeon: Mandeep Park MD;  Location:  GI     ESOPHAGOSCOPY, GASTROSCOPY, DUODENOSCOPY (EGD), COMBINED N/A 10/25/2015    Procedure: COMBINED ESOPHAGOSCOPY, GASTROSCOPY, DUODENOSCOPY (EGD);  Surgeon: Sammy Amaro MD;  Location:  GI     ESOPHAGOSCOPY, GASTROSCOPY, DUODENOSCOPY (EGD), COMBINED N/A 10/25/2015    Procedure: COMBINED ESOPHAGOSCOPY, GASTROSCOPY, DUODENOSCOPY (EGD), BIOPSY SINGLE OR MULTIPLE;  Surgeon: Sammy Amaro MD;  Location:  GI     ESOPHAGOSCOPY, GASTROSCOPY, DUODENOSCOPY (EGD), COMBINED N/A 1/31/2023    Procedure: ESOPHAGOGASTRODUODENOSCOPY (EGD) with peg replacement ;  Surgeon: Mandeep Park MD;  [facility]     ESOPHAGOSCOPY, GASTROSCOPY, DUODENOSCOPY (EGD), DILATATION, COMBINED       EXCISE LESION TRUNK N/A 4/17/2017    Procedure: EXCISE LESION TRUNK;  Removal of Abdominal Foreign Body;  Surgeon: Nestor Phoenix MD;  [facility]     HC ESOPH/GAS REFLUX TEST W NASAL IMPED >1 HR N/A 11/19/2015    Procedure: ESOPHAGEAL IMPEDENCE FUNCTION TEST WITH 24 HOUR PH  GREATER THAN 1 HOUR;  Surgeon: Thiago Apple MD;  Location: [facility] GI     HC UGI ENDOSCOPY DIAG W BIOPSY  9/17/08      UGI ENDOSCOPY DIAG W BIOPSY  9/27/12      UGI ENDOSCOPY W ESOPHAGEAL DILATION BALLOON <30MM  9/17/08      UGI ENDOSCOPY W EUS N/A 5/5/2015    Procedure: COMBINED ENDOSCOPIC ULTRASOUND, ESOPHAGOSCOPY, GASTROSCOPY, DUODENOSCOPY (EGD);  Surgeon: Wm Dueñas MD;  [facility]: [facility] GI     HC WRIST ARTHROSCOP,RELEASE XVERS LIG Bilateral 12/17/08     INJECT TRANSVERSUS ABDOMINIS PLANE (TAP) BLOCK BILATERAL Left 9/22/2016    Procedure: INJECT TRANSVERSUS ABDOMINIS PLANE (TAP) BLOCK BILATERAL;  Surgeon: Dickson Corrigan MD;  [facility]     INJECT TRIGGER POINT Bilateral 9/8/2022    Procedure: Abdominal trigger point injection with ultrasound;  Surgeon: Monika Mahajan MD;  Location: [facility] OR     INJECT TRIGGER POINT SINGLE / MULTIPLE 3 OR MORE MUSCLES Right 11/15/2022    Procedure: Trigger point injections right abdomen with ultrasound guidance;  Surgeon: Monika Mahajan MD;  Location: [facility] OR     IR CHEST PORT PLACEMENT < 5 YRS OF AGE  6/10/2022     laparoscopic pineda  1995     LAPAROSCOPIC HERNIORRHAPHY INCISIONAL N/A 8/23/2018    Procedure: LAPAROSCOPIC HERNIORRHAPHY INCISIONAL;  Laparoscopic Incisional Hernia Repair with Symbotex Mesh Implant;  Surgeon: Nestor Phoenix MD;  [facility]     LAPAROSCOPIC PANCREATECTOMY, TRANSPLANT AUTO ISLET CELL N/A 12/28/2016    Procedure: LAPAROSCOPIC PANCREATECTOMY, TRANSPLANT AUTO ISLET CELL;  Surgeon: Nestor Phoenix MD;  [facility]     LAPAROTOMY EXPLORATORY N/A 1/31/2023    Procedure: Exploratory Laparotomy, lysis of adhesions, Perforated J-Junostomy Resection, Replace J-Junostomy site;  Surgeon: Elver Bauer MD;  [facility]     LAPAROTOMY, LYSIS ADHESIONS, COMBINED N/A 1/31/2023    Procedure: Dilatation of jejunostomy feeding tube, track with placement of jejunostomy tube with fluoroscopy;  Surgeon: Elver Bauer  MD Wale;  [facility]     REMOVE AND REPLACE BREAST IMPLANT PROSTHESIS N/A 2022    Procedure: Exploratory Laparotomy, lysis of adhesions, small bowel resection. Placement of gastric jejunostomy for enteral feeding.;  Surgeon: Elver Bauer MD;  [facility]     REMOVE GASTROSTOMY TUBE ADULT N/A 2022    Procedure: REMOVAL, GASTROSTOMY TUBE, ADULT;  Surgeon: Mandeep Park MD;  [facility]     REPLACE GASTROJEJUNOSTOMY TUBE, PERCUTANEOUS N/A 2021    Procedure: GASTROJEJUNOSTOMY TUBE PLACEMENT, PERCUTANEOUS, WITH GASTROPEXY;  Surgeon: Mandeep Park MD;  [facility]     REPLACE GASTROJEJUNOSTOMY TUBE, PERCUTANEOUS N/A 2021    Procedure: REPLACEMENT, GASTROJEJUNOSTOMY TUBE, PERCUTANEOUS;  Surgeon: Zackery Montoya MD;  [facility]     REPLACE GASTROJEJUNOSTOMY TUBE, PERCUTANEOUS N/A 2022    Procedure: REPLACEMENT, GASTROJEJUNOSTOMY TUBE, PERCUTANEOUS;  Surgeon: Mandeep Park MD;  [facility]     REPLACE GASTROJEJUNOSTOMY TUBE, PERCUTANEOUS N/A 2022    Procedure: REPLACEMENT, GASTROJEJUNOSTOMY TUBE, PERCUTANEOUS with ENDOSCOPIC SUTURING.;  Surgeon: Mandeep Park MD;  [facility]     REPLACE JEJUNOSTOMY TUBE, PERCUTANEOUS N/A 9/10/2021    Procedure: UPPER ENDOSCOPY, REPLACEMENT OF PERCUTANEOUS GASTROJEJUNOSTOMY TUBE, T-TAG GASTROPEXY;  Surgeon: Zackery Montoya MD;  [facility]     REPLACE JEJUNOSTOMY TUBE, PERCUTANEOUS N/A 2021    Procedure: REPLACEMENT, JEJUNOSTOMY TUBE, PERCUTANEOUS;  Surgeon: Mandeep Park MD;  [facility]     REPLACE JEJUNOSTOMY TUBE, PERCUTANEOUS N/A 2023    Procedure: REPLACEMENT, JEJUNOSTOMY TUBE, PERCUTANEOUS;  Surgeon: Mandeep Park MD;  [facility]     transphenoidal pituitary resection       ZZC  DELIVERY ONLY       ZZC  DELIVERY ONLY      repeat c section with incidental cystotomy with repair     ZZC EXCIS  PITUITARY,TRANSNASAL/SEPTAL      pituitary tumor removed for diabetes insipidus     ZZC TOTAL ABDOM HYSTERECTOMY      w/ bilateral salpingoophorectomy             Social History:   Reviewed and edited as appropriate  Social History     Socioeconomic History     Marital status:      Spouse name: Norris     Number of children: 3     Years of education: 16     Highest education level: Not on file   Occupational History     Occupation: director     Employer: [employer]   Tobacco Use     Smoking status: Former     Packs/day: 0.50     Years: 6.00     Pack years: 3.00     Types: Cigarettes     Start date: 1985     Quit date: 1992     Years since quittin.4     Smokeless tobacco: Not on file     Tobacco comments:     no 2nd hand   Vaping Use     Vaping status: Not on file   Substance and Sexual Activity     Alcohol use: Not Currently     Alcohol/week: 3.0 - 6.0 standard drinks of alcohol     Types: 1 - 2 Glasses of wine, 1 - 2 Cans of beer, 1 - 2 Shots of liquor per week     Comment: none since IGG     Drug use: No     Sexual activity: Not Currently     Partners: Male     Birth control/protection: Post-menopausal     Comment: Hystectomy    Other Topics Concern     Parent/sibling w/ CABG, MI or angioplasty before 65F 55M? No      Service Not Asked     Blood Transfusions No     Caffeine Concern Not Asked     Occupational Exposure Not Asked     Hobby Hazards Not Asked     Sleep Concern Not Asked     Stress Concern Not Asked     Weight Concern Not Asked     Special Diet Not Asked     Back Care Not Asked     Exercise Not Asked     Bike Helmet Not Asked     Seat Belt Yes     Self-Exams Not Asked   Social History Narrative     with 3 children and a dog.  No smoking, etoh or drug use.  Worked as a  for [employer] in [employer] in the past.  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     Social Determinants of Health     Financial Resource  Strain: Not on file   Food Insecurity: Not on file   Transportation Needs: Not on file   Physical Activity: Not on file   Stress: Not on file   Social Connections: Not on file   Intimate Partner Violence: Not on file   Housing Stability: Not on file            Family History:   Reviewed and edited as appropriate  No known history of gastrointestinal/liver disease or  gastrointestinal malignancies       Allergies:   Reviewed and edited as appropriate     Allergies   Allergen Reactions     Apple Juice Anaphylaxis     Corticosteroids Other (See Comments)     All oral, IV and injectable steroids are contraindicated (unless in life threatening situations) in [facility] transplant recipients. They can cause irreversible loss of islet cell function. Please contact patient's transplant care coordinator ADI Gaffney RN at [phone]/pager [phone] and/or endocrinologist prior to administration.       Depakote [Divalproex Sodium] Other (See Comments)     Chest pain     Zithromax [Azithromycin Dihydrate] Anaphylaxis     Noted in 4/7/08 ER     Bromfenac      Other reaction(s): Headache     Codeine      Other reaction(s): Hallucinations     Darvocet [Propoxyphene N-Apap] Itching     Morphine Nausea and Vomiting and Rash     Nalbuphine Hcl Rash     RASH :nubaine     Zosyn [Piperacillin-Tazobactam In Dex] Rash     Possible allergy, did have a diffuse rash that seemed drug related but could have also been related to soap in the hospital.      Bactrim [Sulfamethoxazole W-Trimethoprim] Other (See Comments) and Nausea and Vomiting     Severely low liver function.     Statins Other (See Comments)     Previous liver issues, risks vs benefits felt to not warrant statin.  Discussed Oct 2022 visit     Tape [Adhesive Tape] Blisters     Tramadol      Zofran [Ondansetron] Other (See Comments)     migraine     Flagyl [Metronidazole] Hives and Rash            Medications:     Facility-Administered Medications Prior to Admission    Medication Dose Route Frequency Provider Last Rate Last Admin     [START ON 6/6/2023] Botulinum Toxin Type A (BOTOX) 200 units injection 155 Units  155 Units Intramuscular See Admin Instructions Rachelle العلي APRN CNP         Medications Prior to Admission   Medication Sig Dispense Refill Last Dose     acetaminophen (TYLENOL) 325 MG/10.15ML solution 20.3 mLs (650 mg) by Per J Tube route every 6 hours as needed for mild pain or fever 473 mL 4      amitriptyline (ELAVIL) 10 MG tablet Take 2 tablets (20 mg) by mouth At Bedtime Take 2 tabs for 5 days then increase to 3 tabs for 5 days then increase to 4 tabs for 5 days. 50 tablet 0      amitriptyline (ELAVIL) 10 MG tablet Take 1 tablet (10 mg) by mouth At Bedtime 30 tablet 0      amylase-lipase-protease (CREON) 46065-80947 units CPEP per EC capsule Take 3-4 capsules by mouth 3 times daily (with meals) With oral meals 150 capsule 11      blood glucose (PAT CONTOUR NEXT) test strip Use to test blood sugar 6 times daily or as directed. 2 Box 11      blood glucose monitoring (PAT MICROLET) lancets Use to test blood sugar 6-8 times daily or as directed. 100 each 11      cetirizine (ZYRTEC) 10 MG tablet 1 tablet (10 mg) by Per G Tube route daily 30 tablet 11      Continuous Blood Gluc Sensor (FREESTYLE LISA 2 SENSOR) [facility] [place] SENSOR ONCE EVERY 14 DAYS 2 each 11      Continuous Blood Gluc Sensor (FREESTYLE LISA 3 SENSOR) [facility] [place] each every 14 days 2 each 11      cyclobenzaprine (FLEXERIL) 10 MG tablet 1 tablet (10 mg) by Per G Tube route 2 times daily as needed for muscle spasms 60 tablet 11      diphenhydrAMINE (BENADRYL ALLERGY) 25 MG capsule Take 1 capsule (25 mg) by mouth every 6 hours as needed for itching or allergies 56 capsule 0      estradiol (VAGIFEM) 10 MCG TABS vaginal tablet INSERT 1 TABLET(10 MCG) VAGINALLY 2 TIMES A WEEK 24 tablet 2      famotidine (PEPCID) 40 MG/5ML suspension 2.5 mLs (20 mg) by Per J Tube route daily 50 mL 4       "glucagon 1 MG kit Give 0.1 to 0.15mg( 10-15 units on Insulin sryinge) subcutaneous  every 15 minutes PRN for hypoglycemia. Remix new kit q24hr. Needs up to 3 kit/week. 10 each 3      glucose 40 % GEL gel Take 15-30 g by mouth every 15 minutes as needed for low blood sugar 3 Tube 2      ibuprofen (ADVIL/MOTRIN) 400 MG tablet Take 1 tablet (400 mg) by mouth every 6 hours as needed for moderate pain 30 tablet 0      insulin syringe-needle U-100 (30G X 1/2\" 0.3 ML) 30G X 1/2\" 0.3 ML miscellaneous Use 3 syringes daily or as directed. 50 each 1      ketorolac (TORADOL) 10 MG tablet Take 1 tablet (10 mg) by mouth every 6 hours as needed for moderate pain 20 tablet 0      LACTATED RINGERS IV Inject 1 L into the vein daily        levothyroxine (SYNTHROID/LEVOTHROID) 137 MCG tablet 137 mcg by Per G Tube route daily        melatonin 1 MG TABS tablet Take 1 tablet (1 mg) by mouth nightly as needed for sleep 30 tablet 0      methocarbamol (ROBAXIN) 750 MG tablet 1 tablet (750 mg) by Per Feeding Tube route 4 times daily 120 tablet 3      montelukast (SINGULAIR) 10 MG tablet 10 mg by Per G Tube route At Bedtime        Nutritional Supplements (BOOST HIGH PROTEIN) LIQD After above baseline labs are drawn, give: 6 mL/kg to maximum of 360 mL; the beverage is to be consumed within 5 minutes.  0      pantoprazole (PROTONIX) 40 mg IV push injection Inject 40 mg into the vein daily (with breakfast) 30 each 11      parenteral nutrition - PTA/DISCHARGE ORDER The TPN formula will print on the After Visit Summary Report. 1 each 0      promethazine-phenylephrine (PROMETHAZINE VC) 6.25-5 MG/5ML syrup 20 mLs (25 mg) by Per Feeding Tube route nightly as needed for nausea - Per Feeding Tube 473 mL 3      prucalopride succinate (MOTEGRITY) 2 MG tablet Take 1 tablet (2 mg) by mouth daily 30 tablet 6      scopolamine (TRANSDERM) 1 MG/3DAYS 72 hr patch Place 1 patch onto the skin every 72 hours 10 patch 11      sennosides (SENOKOT) 8.8 MG/5ML syrup 5 " "mLs by Per J Tube route 2 times daily 236 mL 11      Sharps Container (BD SHARPS ) MISC 1 Container as needed 1 each 1      STATIN NOT PRESCRIBED (INTENTIONAL) Previous liver issues, risks vs benefits felt to not warrant statin.    Discussed Oct 2022 visit        thiamine (B-1) 100 MG tablet 1 tablet (100 mg) by Per J Tube route daily 30 tablet 11      zinc oxide - white petrolatum (CRITIC-AID THICK MOIST BARRIER) 20-51% PSTE topical paste Apply 71 g topically every hour as needed for rash (Under J tube bumper when needed for skin protection) 170 g 0      ZOLMitriptan (ZOMIG-ZMT) 5 MG ODT Take 1 tablet (5 mg) by mouth at onset of headache for migraine May repeat in 2 hours. Max 2 tablets/24 hours. 18 tablet 6              Review of Systems:     A complete review of systems was performed and is negative except as noted in the HPI           Physical Exam:   /72 (BP Location: Left arm)   Pulse 79   Temp 97.8  F (36.6  C) (Oral)   Resp 16   Ht 1.727 m (5' 8\")   Wt 61.2 kg (135 lb)   SpO2 97%   BMI 20.53 kg/m    Wt:   Wt Readings from Last 2 Encounters:   06/04/23 61.2 kg (135 lb)   05/11/23 57.6 kg (127 lb)      Constitutional: no acute distress  HEENT: Sclera anicteric  CV: No edema  Respiratory: Unlabored breathing  Abdomen: Non-distended, generalized tender, no peritoneal signs, PEG tube and J tube sites without sign of infection.  Skin: warm, perfused  Neuro: AAO x4         Data:   Labs and imaging below were independently reviewed and interpreted    Imaging: Reviewed.    CTA abd/pelvis 6/4/23  Arterial: Normal caliber thoracic and abdominal aorta. No significant atherosclerotic plaque. No   acute aortic syndrome. Single bilateral renal arteries are patent and normal in caliber. 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 Celiac artery, SMA, and LAZARUS are patent. Imaged   iliofemoral arteries are patent and normal in caliber.     CHEST: Right IJ Port-A-Cath tip at the cavoatrial " junction. Heart size is normal. No pericardial   effusion. Normal caliber pulmonary artery. No central pulmonary embolism. No abnormally enlarged   lymph nodes. Atrophic thyroid gland. Central tracheobronchial tree is clear. Dependent subsegmental   atelectasis. Calcified left lower lobe granuloma. No pleural effusion or acute infection. 3 mm right   upper lobe nodule on series 3 image 103.     Abdomen/pelvis: Symmetric nephrogram. No hydronephrosis or nephrolithiasis. No hydroureter or   obstructing ureteral stone. Patent bilateral renal veins. No suspicious solid or cystic renal mass.   No hydroureter or obstructing ureteral stone. Normal-appearing urinary bladder. Hysterectomy. No   adnexal mass.     Percutaneous jejunostomy catheter. Additional percutaneous catheter with associated metallic streak   artifact in the retrogastric space status post pancreatectomy and cholecystectomy. No bowel obstruction. Moderate colonic stool volume suggest constipation. Appendectomy.     No acute or suspicious finding within the liver, or adrenal glands. No ascites or pneumoperitoneum.   No lymphadenopathy.     Bones and soft tissues: Thoracolumbar spondylosis. No acute or aggressive bone or soft tissue   finding.

## 2023-06-05 NOTE — PLAN OF CARE
Goal Outcome Evaluation:      Plan of Care Reviewed With: patient, friend    Overall Patient Progress: no changeOverall Patient Progress: no change    Outcome Evaluation: Nutrition support recs in RD note.    Darcy Kenney, MPH, RDN, LD  [phone] [phone] RD pager: [phone]  Weekend/Holiday RD pager: [phone]

## 2023-06-05 NOTE — PROGRESS NOTES
CLINICAL NUTRITION SERVICES - ASSESSMENT NOTE     Nutrition Prescription    RECOMMENDATIONS FOR MDs/PROVIDERS TO ORDER:   - Continue Thiamine enterally 100 mg daily x 5-7 days due to refeeding risk    Malnutrition Status:    Patient does not meet two of the established criteria necessary for diagnosing malnutrition but is at risk for malnutrition    Recommendations already ordered by Registered Dietitian (RD):  None at this time - Nutrition Support recs below once patient is no longer NPO     Future/Additional Recommendations:  Enteral Nutrition - Initiate TFs as below once no longer NPO and if unable to resume TPN:   Use dosing weight 61 kg (admission weight)   EN access: J-tube  Regimen: Vivonex T.E.N. @ goal 50 ml/hr (1200 ml/day) + 1 pack PS TF20 to provide 1280 kcals (21 kcal/kg/day), 66 g PRO (1.1 g/kg/day), 3.6 g fat, 247 g CHO and 990 ml free H2O   Recipe for NSA: Mix 4 packets Vivonex T.E.N. with 1000 ml free water to yield 1200 ml formula.   Do NOT need Relizorb or Creon with Vivonex due to very low fat content.      - Do not start or advance TF rate unless Mg++ >1.5, K+ is >3, and phos >2  - Initiate @ 10 ml/hr and advance by 10 ml q8hr pending pt's tolerance.  - Recommend 30 ml q4hr fluid flushes for tube patency. Additional fluids and/or adjustments per MD.    - Order multivitamin/mineral (15 ml/day via FT) to help ensure micronutrient needs being met with suspected hypermetabolic demands and potential interruptions to TF infusions.   - Add additional 100 mg Thiamine x 5-7 days to prevent lyte depletion if at risk for refeeding syndrome    Once TPN is able to resume, EN + PN as follows:   Parenteral Nutrition:  1. Dosing weight:  61 kg  2. Access: TBD    3. Initial parameters (per day)  Volume:  1440 mL -- includes additional fluids for hydration  Dextrose: 80 g  AA: 55 g  Lipids: 250 mL 20%, 2 days per week  4. Dextrose titration:   Monitor lytes and if within acceptable parameters (Mg++ > or = 1.5, K+  is > or = 3, and PO4 > or = 1.9), increase dextrose by 35 g/day to goal of 150 g dextrose.  5. Additives: standard multivitamin and trace elements  Goal PN provides 150 g dextrose, 55 g AA, and 250 mL 20% lipids 2 days per week for total provision of 873 Kcals (14 Kcals/kg), 0.9 g/kg protein, GIR 1.7 mg/kg/minute, and 16% fat kcals on average daily.   Enteral Nutrition  Vivonex T.E.N. @ goal 40 ml/hr (960 ml/day) to provide 960 kcals (16 kcal/kg/day), 37 g PRO (0.6 g/kg/day), 2.9 g fat, 198 g CHO and 792 ml free H2O    Once PO is able to resume, Regular diet, PO for pleasure/as desired. Creon 24,000 3-4 capsules with meals (1574 units lipase/kg/meal -- within appropriate range)    Monitor nutrition-related findings and follow pt per protocol     REASON FOR ASSESSMENT  Dinora Mcghee is a/an 58 year old female assessed by the dietitian for Provider Order - Registered Dietitian to Assess and Order TF per Medical Nutrition Therapy Protocol    CLINICAL HISTORY  PMH of gallstone s/p cholecystectomy (1996), s/p multiple ERCP c/b recurrent pancreatitis, chronic abdominal pain, gastroparesis c/b chronic nausea and vomiting, chronic intermittent pancreatitis, s/p splenectomy and appendectomy, chronic malnutrition/FTT on G-tube s/p multiple G/J-tube adjustments/laparotomy s/p TPAIT & jamel-en-y gastric bypass (2016), Hx of GERD, hypothyroidism, uterine tumor s/p hysterectomy now on regular diet, TPN and TF at home     NUTRITION HISTORY  PTA TPN + TF + PO    - Regular diet (eating limited amounts of food at home, reports she is only able to tolerate ~1 cup of food at a time, typically something like mashed potatoes, takes Creon-24 1-3 capsules with PO intake), no PO intake x3 days PTA and since admission d/t not feeling well    - Home TPN (on hold until line infection ruled out), lipids 2x/week; TPN run overnight x14 hours (150g dextrose, 55g AA). MVI via TPN, insulin added to TPN as of ~2 weeks ago.    - Home TF via J-tube.  Patient reports that her GI MD has instructed her to not advance TF beyond 30 ml/h to keep J-tube patent and utilize TPN to meet majority of nutrition needs (unclear if 30 ml/h is d/t patient tolerance v pt report of MD instruction, per chart review). Home TF of Vivonex at 30 ml/h x14 hours/day (420 ml/day total provides 420 kcal and 16 g protein). Patient had been on Vital 1.5 earlier in 2023, however, was unable to advance the rate past 40 ml/h without severe diarrhea - pt's GI MD suspected this was 2/2 J-tube length itself was too long, shortened/trimmed J-tube in March, however patient has not yet tried resuming Vital 1.5 with the exception of 1 day during that hospital admission where she did end up getting diarrhea but reports that she believes that was d/t not feeling well - would like to trial Vital 1.5 again later on during this admission (continue with Vivonex initially as she knows she tolerates this formula). Reports she had also tried [employer] 1.5 prior to Vital 1.5 and did not tolerate this formula at all.      PTA TF + EN provides 1293 kcal/day (21 kcal/kg) and 71g protein/day (1.16 g/kg)    Patient also reports the following food allergies: Strawberry, apple, alice, watermelon, celery     CURRENT NUTRITION ORDERS  Diet: Regular  Intake/Tolerance: No intakes at this time     GI    G-tube output 450 ml today thus far      6/3 CT chest, abdomen: Percutaneous jejunostomy catheter. Additional percutaneous catheter with associated metallic streak       LABS    Low phos, improved from admission 1.8 to 2.2    Low Mg on admission (1.5), improved to 1.9 this morning (WNL)     Alk phos elevated, bilirubin WNL       TGs WNL (107, 5/12; 55 5/20 @ OSH)     Vit A WNL (0.46, 5/9)    Vit E WNL (8.2 5/9)     Vit D elevated (113, 5/9)     Zinc WNL (78.6, 5/9)     Vit B12 WNL (798, 5/9)  Electrolytes  Potassium (mmol/L)   Date Value   06/05/2023 3.8   06/04/2023 3.5   05/12/2023 4.7   08/01/2022 4.1   06/10/2022 4.1    02/17/2022 4.0   12/17/2020 3.5   01/31/2020 4.0   09/13/2019 4.1     Phosphorus (mg/dL)   Date Value   06/05/2023 2.2 (L)   06/04/2023 1.8 (L)   05/12/2023 4.1   05/11/2023 5.3 (H)   05/10/2023 4.9 (H)   01/23/2017 3.6   01/09/2017 4.6 (H)   10/21/2016 3.4   09/08/2016 3.3   06/10/2015 4.3    Blood Glucose  Glucose (mg/dL)   Date Value   06/05/2023 186 (H)   06/04/2023 192 (H)   08/01/2022 113 (H)   06/10/2022 128 (H)   02/17/2022 137 (H)   02/17/2022 174 (H)   02/17/2022 132 (H)   02/17/2022 132 (H)   12/17/2020 120 (H)   01/31/2020 91   01/31/2020 121 (H)   01/31/2020 112 (H)   09/13/2019 93     GLUCOSE BY METER POCT (mg/dL)   Date Value   06/05/2023 148 (H)   06/05/2023 158 (H)   06/05/2023 128 (H)   06/04/2023 117 (H)   05/12/2023 155 (H)     Hemoglobin A1C (%)   Date Value   02/17/2022 5.4   11/27/2021 5.2   09/18/2021 5.5   08/05/2021 5.4   05/12/2021 5.7   04/01/2021 5.9 (A)   12/17/2020 5.5   07/30/2020 5.8 (H)    Inflammatory Markers  CRP Inflammation (mg/L)   Date Value   12/29/2016 90.0 (H)   06/06/2015 71.0 (H)   10/23/2012 7.2     WBC (10e9/L)   Date Value   12/17/2020 5.0   09/14/2020 6.2   01/31/2020 4.2     WBC Count (10e3/uL)   Date Value   06/05/2023 14.4 (H)   06/04/2023 8.8   05/09/2023 6.0     Albumin (g/dL)   Date Value   06/05/2023 3.2 (L)   06/04/2023 3.6   05/10/2023 4.3   02/17/2022 4.2   12/03/2021 3.0 (L)   11/28/2021 2.7 (L)   12/17/2020 3.9   07/30/2020 3.9   01/31/2020 4.1      Magnesium (mg/dL)   Date Value   06/05/2023 1.9   06/05/2023 2.3   06/04/2023 1.5 (L)   01/23/2017 2.0   01/22/2017 2.4 (H)   01/09/2017 2.8 (H)     Sodium (mmol/L)   Date Value   06/05/2023 139   06/04/2023 140   05/12/2023 140   12/17/2020 142   01/31/2020 141   09/13/2019 138    Renal  Urea Nitrogen (mg/dL)   Date Value   06/05/2023 5.9 (L)   06/04/2023 11.0   05/12/2023 21.7 (H)   08/01/2022 14   06/10/2022 12   02/17/2022 15   12/17/2020 9   01/31/2020 10   09/13/2019 14     Creatinine (mg/dL)   Date  "Value   06/05/2023 0.41 (L)   06/04/2023 0.50 (L)   05/12/2023 0.59   12/17/2020 0.78   01/31/2020 0.68   09/13/2019 0.76     Additional  Triglycerides (mg/dL)   Date Value   05/12/2023 107   03/06/2023 118   01/31/2020 49   04/04/2017 55   12/19/2016 67     Ketones Urine (mg/dL)   Date Value   06/04/2023 Negative   07/31/2018 Negative        MEDICATIONS    Creon 24, 2-4 capsules TID w/ meals     Vit B1     LR IVF 75 ml/h     SKIN    No pressure injuries noted at this time      ANTHROPOMETRICS  Height: 172.7 cm (5' 8\")  Most Recent Weight: 61.2 kg (135 lb)    IBW: 63.6 kg   96%IBW   BMI: Normal BMI    Weight History:   No significant weight loss   Pt reports UBW ~130 lbs, reports being fluid up with swelling in her feet, fingers and abdomen   Wt Readings from Last 30 Encounters:   06/04/23 61.2 kg (135 lb)   05/11/23 57.6 kg (127 lb)   05/04/23 58.6 kg (129 lb 3 oz)   03/30/23 60.8 kg (134 lb)   03/06/23 60.9 kg (134 lb 3.2 oz)   02/22/23 61.3 kg (135 lb 3.2 oz)   02/08/23 62.2 kg (137 lb 3.2 oz)   02/04/23 60.1 kg (132 lb 9.6 oz)   01/26/23 63.4 kg (139 lb 12.8 oz)   01/19/23 60.8 kg (134 lb 1.6 oz)   01/12/23 61.4 kg (135 lb 6.4 oz)   01/04/23 63.8 kg (140 lb 11.2 oz)   12/22/22 65.2 kg (143 lb 12.8 oz)   12/09/22 65.9 kg (145 lb 4.5 oz)   12/01/22 65.6 kg (144 lb 9.6 oz)   11/22/22 64.5 kg (142 lb 3.2 oz)   11/15/22 64 kg (141 lb)   11/02/22 65.5 kg (144 lb 4.8 oz)   09/08/22 66.2 kg (146 lb)   09/07/22 66.5 kg (146 lb 9.6 oz)   08/01/22 65.1 kg (143 lb 8 oz)   06/10/22 64 kg (141 lb)   06/08/22 64 kg (141 lb)   06/02/22 63.8 kg (140 lb 9.6 oz)   05/03/22 63.7 kg (140 lb 6.4 oz)     Dosing Weight: 61.2 kg, admission weight     ASSESSED NUTRITION NEEDS  Estimated Energy Needs: 1530 - 1836 kcals/day (25 - 30 kcals/kg)  Justification: Maintenance  Estimated Protein Needs: 61 - 73 grams protein/day (1 - 1.2 grams of pro/kg)  Justification: Maintenance  Estimated Fluid Needs: 1530 - 1836 mL/day (1 mL/kcal) "   Justification: Maintenance    PHYSICAL FINDINGS  See malnutrition section below.    MALNUTRITION  % Intake: Decreased intake does not meet criteria  % Weight Loss: None noted  Subcutaneous Fat Loss: None observed  Muscle Loss: Temporal:  mild and Thoracic region (clavicle, acromium bone, deltoid, trapezius, pectoral):  mild  Fluid Accumulation/Edema: None noted  Malnutrition Diagnosis: Patient does not meet two of the established criteria necessary for diagnosing malnutrition but is at risk for malnutrition    NUTRITION DIAGNOSIS  Inadequate protein-energy intake related to GI intolerance as evidenced by reliance on nutrition support, inability to reach goal EN, nausea/diarrhea, TPN on hold      INTERVENTIONS  Implementation  Nutrition Education: role of RD    Collaboration with other providers - MD, pharmacy   Enteral Nutrition - Initiate  Nutrition-related medication management - recommendations for pancreatic enzymes  Parenteral Nutrition/IV Fluids - recommendations      Goals  Total avg nutritional intake to meet a minimum of 25 kcal/kg and 1 g PRO/kg daily (per dosing wt 61 kg).     Monitoring/Evaluation  Progress toward goals will be monitored and evaluated per protocol.    Darcy Kenney, MPH, RDN, LD  5A ([phone])/5B RD pager: [phone]  Weekend/Holiday RD pager: [phone]

## 2023-06-06 LAB
1,3 BETA GLUCAN SER-MCNC: <31 PG/ML
ALBUMIN SERPL BCG-MCNC: 3.3 G/DL (ref 3.5–5.2)
ALP SERPL-CCNC: 210 U/L (ref 35–104)
ALT SERPL W P-5'-P-CCNC: 221 U/L (ref 10–35)
ANION GAP SERPL CALCULATED.3IONS-SCNC: 10 MMOL/L (ref 7–15)
AST SERPL W P-5'-P-CCNC: 67 U/L (ref 10–35)
ATRIAL RATE - MUSE: 72 BPM
BACTERIA BLD CULT: ABNORMAL
BILIRUB SERPL-MCNC: 0.5 MG/DL
BUN SERPL-MCNC: 3.5 MG/DL (ref 6–20)
CALCIUM SERPL-MCNC: 8.7 MG/DL (ref 8.6–10)
CHLORIDE SERPL-SCNC: 103 MMOL/L (ref 98–107)
CREAT SERPL-MCNC: 0.46 MG/DL (ref 0.51–0.95)
DEPRECATED HCO3 PLAS-SCNC: 26 MMOL/L (ref 22–29)
DIASTOLIC BLOOD PRESSURE - MUSE: NORMAL MMHG
ERYTHROCYTE [DISTWIDTH] IN BLOOD BY AUTOMATED COUNT: 14.3 % (ref 10–15)
GFR SERPL CREATININE-BSD FRML MDRD: >90 ML/MIN/1.73M2
GLUCOSE BLDC GLUCOMTR-MCNC: 107 MG/DL (ref 70–99)
GLUCOSE BLDC GLUCOMTR-MCNC: 113 MG/DL (ref 70–99)
GLUCOSE BLDC GLUCOMTR-MCNC: 122 MG/DL (ref 70–99)
GLUCOSE BLDC GLUCOMTR-MCNC: 123 MG/DL (ref 70–99)
GLUCOSE BLDC GLUCOMTR-MCNC: 131 MG/DL (ref 70–99)
GLUCOSE BLDC GLUCOMTR-MCNC: 78 MG/DL (ref 70–99)
GLUCOSE SERPL-MCNC: 132 MG/DL (ref 70–99)
HCT VFR BLD AUTO: 34.7 % (ref 35–47)
HGB BLD-MCNC: 11.1 G/DL (ref 11.7–15.7)
INTERPRETATION ECG - MUSE: NORMAL
MAGNESIUM SERPL-MCNC: 1.6 MG/DL (ref 1.7–2.3)
MCH RBC QN AUTO: 30.4 PG (ref 26.5–33)
MCHC RBC AUTO-ENTMCNC: 32 G/DL (ref 31.5–36.5)
MCV RBC AUTO: 95 FL (ref 78–100)
OBSERVATION IMP: NEGATIVE
P AXIS - MUSE: 69 DEGREES
PHOSPHATE SERPL-MCNC: 2.8 MG/DL (ref 2.5–4.5)
PLATELET # BLD AUTO: 184 10E3/UL (ref 150–450)
POTASSIUM SERPL-SCNC: 3.7 MMOL/L (ref 3.4–5.3)
PR INTERVAL - MUSE: 132 MS
PROT SERPL-MCNC: 6 G/DL (ref 6.4–8.3)
QRS DURATION - MUSE: 76 MS
QT - MUSE: 400 MS
QTC - MUSE: 438 MS
R AXIS - MUSE: 9 DEGREES
RBC # BLD AUTO: 3.65 10E6/UL (ref 3.8–5.2)
SODIUM SERPL-SCNC: 139 MMOL/L (ref 136–145)
SYSTOLIC BLOOD PRESSURE - MUSE: NORMAL MMHG
T AXIS - MUSE: 47 DEGREES
VENTRICULAR RATE- MUSE: 72 BPM
WBC # BLD AUTO: 8.8 10E3/UL (ref 4–11)

## 2023-06-06 PROCEDURE — 93010 ELECTROCARDIOGRAM REPORT: CPT | Performed by: INTERNAL MEDICINE

## 2023-06-06 PROCEDURE — 250N000011 HC RX IP 250 OP 636: Performed by: STUDENT IN AN ORGANIZED HEALTH CARE EDUCATION/TRAINING PROGRAM

## 2023-06-06 PROCEDURE — 87040 BLOOD CULTURE FOR BACTERIA: CPT | Performed by: STUDENT IN AN ORGANIZED HEALTH CARE EDUCATION/TRAINING PROGRAM

## 2023-06-06 PROCEDURE — 250N000011 HC RX IP 250 OP 636: Performed by: PHYSICIAN ASSISTANT

## 2023-06-06 PROCEDURE — 99233 SBSQ HOSP IP/OBS HIGH 50: CPT | Mod: FS | Performed by: PHYSICIAN ASSISTANT

## 2023-06-06 PROCEDURE — 85027 COMPLETE CBC AUTOMATED: CPT | Performed by: PHYSICIAN ASSISTANT

## 2023-06-06 PROCEDURE — 120N000002 HC R&B MED SURG/OB UMMC

## 2023-06-06 PROCEDURE — 250N000013 HC RX MED GY IP 250 OP 250 PS 637: Performed by: PHYSICIAN ASSISTANT

## 2023-06-06 PROCEDURE — 250N000013 HC RX MED GY IP 250 OP 250 PS 637

## 2023-06-06 PROCEDURE — 93005 ELECTROCARDIOGRAM TRACING: CPT

## 2023-06-06 PROCEDURE — 250N000011 HC RX IP 250 OP 636: Performed by: INTERNAL MEDICINE

## 2023-06-06 PROCEDURE — 250N000013 HC RX MED GY IP 250 OP 250 PS 637: Performed by: STUDENT IN AN ORGANIZED HEALTH CARE EDUCATION/TRAINING PROGRAM

## 2023-06-06 PROCEDURE — 99232 SBSQ HOSP IP/OBS MODERATE 35: CPT | Performed by: INTERNAL MEDICINE

## 2023-06-06 PROCEDURE — 84100 ASSAY OF PHOSPHORUS: CPT | Performed by: STUDENT IN AN ORGANIZED HEALTH CARE EDUCATION/TRAINING PROGRAM

## 2023-06-06 PROCEDURE — 80053 COMPREHEN METABOLIC PANEL: CPT | Performed by: PHYSICIAN ASSISTANT

## 2023-06-06 PROCEDURE — 87086 URINE CULTURE/COLONY COUNT: CPT | Performed by: STUDENT IN AN ORGANIZED HEALTH CARE EDUCATION/TRAINING PROGRAM

## 2023-06-06 PROCEDURE — 250N000009 HC RX 250: Performed by: PHYSICIAN ASSISTANT

## 2023-06-06 PROCEDURE — 258N000003 HC RX IP 258 OP 636: Performed by: STUDENT IN AN ORGANIZED HEALTH CARE EDUCATION/TRAINING PROGRAM

## 2023-06-06 PROCEDURE — 83735 ASSAY OF MAGNESIUM: CPT | Performed by: STUDENT IN AN ORGANIZED HEALTH CARE EDUCATION/TRAINING PROGRAM

## 2023-06-06 PROCEDURE — 36591 DRAW BLOOD OFF VENOUS DEVICE: CPT | Performed by: STUDENT IN AN ORGANIZED HEALTH CARE EDUCATION/TRAINING PROGRAM

## 2023-06-06 PROCEDURE — C9113 INJ PANTOPRAZOLE SODIUM, VIA: HCPCS | Performed by: PHYSICIAN ASSISTANT

## 2023-06-06 RX ORDER — CEFTRIAXONE 2 G/1
2 INJECTION, POWDER, FOR SOLUTION INTRAMUSCULAR; INTRAVENOUS EVERY 24 HOURS
Status: DISCONTINUED | OUTPATIENT
Start: 2023-06-06 | End: 2023-06-06

## 2023-06-06 RX ORDER — ERTAPENEM 1 G/1
1 INJECTION, POWDER, LYOPHILIZED, FOR SOLUTION INTRAMUSCULAR; INTRAVENOUS EVERY 24 HOURS
Status: DISCONTINUED | OUTPATIENT
Start: 2023-06-06 | End: 2023-06-10 | Stop reason: HOSPADM

## 2023-06-06 RX ORDER — METRONIDAZOLE 500 MG/1
500 TABLET ORAL 3 TIMES DAILY
Status: DISCONTINUED | OUTPATIENT
Start: 2023-06-06 | End: 2023-06-06

## 2023-06-06 RX ORDER — POTASSIUM CHLORIDE 1.5 G/1.58G
20 POWDER, FOR SOLUTION ORAL ONCE
Status: COMPLETED | OUTPATIENT
Start: 2023-06-06 | End: 2023-06-06

## 2023-06-06 RX ORDER — MAGNESIUM OXIDE 400 MG/1
400 TABLET ORAL EVERY 4 HOURS
Status: COMPLETED | OUTPATIENT
Start: 2023-06-06 | End: 2023-06-06

## 2023-06-06 RX ADMIN — ERTAPENEM SODIUM 1 G: 1 INJECTION, POWDER, LYOPHILIZED, FOR SOLUTION INTRAMUSCULAR; INTRAVENOUS at 14:47

## 2023-06-06 RX ADMIN — FAMOTIDINE 20 MG: 40 POWDER, FOR SUSPENSION ORAL at 22:38

## 2023-06-06 RX ADMIN — LIDOCAINE: 40 CREAM TOPICAL at 04:14

## 2023-06-06 RX ADMIN — HYDROMORPHONE HYDROCHLORIDE 0.3 MG: 1 INJECTION, SOLUTION INTRAMUSCULAR; INTRAVENOUS; SUBCUTANEOUS at 05:07

## 2023-06-06 RX ADMIN — LEVOTHYROXINE SODIUM 137 MCG: 0.14 TABLET ORAL at 09:48

## 2023-06-06 RX ADMIN — PANTOPRAZOLE SODIUM 40 MG: 40 INJECTION, POWDER, FOR SOLUTION INTRAVENOUS at 09:48

## 2023-06-06 RX ADMIN — AMITRIPTYLINE HYDROCHLORIDE 40 MG: 10 TABLET, FILM COATED ORAL at 22:37

## 2023-06-06 RX ADMIN — SENNOSIDES 5 ML: 8.8 LIQUID ORAL at 19:40

## 2023-06-06 RX ADMIN — SENNOSIDES 5 ML: 8.8 LIQUID ORAL at 09:47

## 2023-06-06 RX ADMIN — ACETAMINOPHEN 650 MG: 325 TABLET, FILM COATED ORAL at 02:12

## 2023-06-06 RX ADMIN — MONTELUKAST 10 MG: 10 TABLET, FILM COATED ORAL at 22:37

## 2023-06-06 RX ADMIN — PROCHLORPERAZINE EDISYLATE 10 MG: 5 INJECTION INTRAMUSCULAR; INTRAVENOUS at 10:56

## 2023-06-06 RX ADMIN — HYDROMORPHONE HYDROCHLORIDE 0.3 MG: 1 INJECTION, SOLUTION INTRAMUSCULAR; INTRAVENOUS; SUBCUTANEOUS at 16:48

## 2023-06-06 RX ADMIN — Medication 400 MG: at 12:12

## 2023-06-06 RX ADMIN — MEROPENEM 1 G: 1 INJECTION, POWDER, FOR SOLUTION INTRAVENOUS at 05:07

## 2023-06-06 RX ADMIN — Medication: at 22:37

## 2023-06-06 RX ADMIN — PANCRELIPASE 2 CAPSULE: 24000; 76000; 120000 CAPSULE, DELAYED RELEASE PELLETS ORAL at 17:41

## 2023-06-06 RX ADMIN — CETIRIZINE HYDROCHLORIDE 10 MG: 10 TABLET, FILM COATED ORAL at 09:48

## 2023-06-06 RX ADMIN — METHOCARBAMOL 750 MG: 750 TABLET ORAL at 02:12

## 2023-06-06 RX ADMIN — SODIUM CHLORIDE, POTASSIUM CHLORIDE, SODIUM LACTATE AND CALCIUM CHLORIDE: 600; 310; 30; 20 INJECTION, SOLUTION INTRAVENOUS at 01:09

## 2023-06-06 RX ADMIN — DIPHENHYDRAMINE HYDROCHLORIDE 25 MG: 25 SOLUTION ORAL at 10:55

## 2023-06-06 RX ADMIN — METHOCARBAMOL 750 MG: 750 TABLET ORAL at 10:55

## 2023-06-06 RX ADMIN — POTASSIUM CHLORIDE 20 MEQ: 1.5 POWDER, FOR SOLUTION ORAL at 09:48

## 2023-06-06 RX ADMIN — Medication 400 MG: at 09:48

## 2023-06-06 RX ADMIN — THIAMINE HCL TAB 100 MG 100 MG: 100 TAB at 09:48

## 2023-06-06 RX ADMIN — PROMETHAZINE HYDROCHLORIDE 25 MG: 25 INJECTION INTRAMUSCULAR; INTRAVENOUS at 16:04

## 2023-06-06 RX ADMIN — METHOCARBAMOL 750 MG: 750 TABLET ORAL at 19:40

## 2023-06-06 RX ADMIN — ACETAMINOPHEN 650 MG: 325 TABLET, FILM COATED ORAL at 19:40

## 2023-06-06 RX ADMIN — OXYCODONE HYDROCHLORIDE 5 MG: 5 SOLUTION ORAL at 09:47

## 2023-06-06 RX ADMIN — ACETAMINOPHEN 650 MG: 325 TABLET, FILM COATED ORAL at 10:55

## 2023-06-06 ASSESSMENT — ACTIVITIES OF DAILY LIVING (ADL)
ADLS_ACUITY_SCORE: 24
DEPENDENT_IADLS:: INDEPENDENT
ADLS_ACUITY_SCORE: 24
ADLS_ACUITY_SCORE: 22
ADLS_ACUITY_SCORE: 24
ADLS_ACUITY_SCORE: 22
ADLS_ACUITY_SCORE: 24
ADLS_ACUITY_SCORE: 24

## 2023-06-06 NOTE — PROGRESS NOTES
[facility]    Medicine Progress Note - Hospitalist Service, GOLD TEAM 8    Date of Admission:  6/4/2023    Assessment & Plan      Dinora Mcghee is a 58 year old woman with history including gallstone s/p cholecystectomy (1996), multiple ERCP c/b recurrent pancreatitis, gastroparesis c/b chronic nausea and vomiting, chronic malnutrition on TF and TPN presented to [facility] ED on 6/4/23 with two days of fever and body aches.    Sepsis  Klebsiella pneumonia bacteremia (2 of 2 Bcx 6/4/23)  Elevated Lactate  Body Aches  Asplenia  Onset of fevers (highest 101.7F day of adm), widespread body aches (head to toes, no meningeal signs), rising LFTs, poor appetite, acute on chronic abdominal pain and worsening of baseline diarrhea. Lactate 2.7-->1.3 with fluids. WBC 8.8 with no left shift. Procal 6.33. UA no e/o infection. CXR no infiltrate. CTA C/A/P at [place] 6/3/23 with no acute findings. CLABSI vs cholangitis vs other.   - Concern for possible line infection: blood cultures pending, continue Ertapenem and Vancomycin started in ED for now. IVF hydration overnight.    - Initially concern for possible tick-born illness (no known tick bites but has found several on her dogs): Labs sent for lyme (Ab neg), ehrlichia, babesia.   - Beta D glucan pending  - Urine culture pending   - Transplant ID consulted  - Continue meropenem 1g q8h; anticipate narrowing per sensitivities with planned 2-week course via port  - Repeat blood cultures tomorrow 6/6  - Surveillance cultures 7d after antibiotics completed  - Stop vancomycin    Transaminitis  Noted rising LFTs as of ~3 weeks ago possibly related to TPN. Lipase and total bili WNL. . LFTs are higher than documented at [place] in [place] day prior to admission.   - Hep A (IgM neg), B (pdg), C (IgG neg)   - INR normal, suggesting intact synthetic function   - MRCP to r/o cholangitis pending   - Recheck CMP in am   - GI consulted    Acute  on Chronic Abdominal Pain  Related to above complicated GI history. Seeing [facility] specialists and being evaluated for spinal cord stimulator. Patient reporting typically has abdominal pain related to gastroparesis, but has been significantly worse particularly on the left side in addition to wide spread body aches from her head to her toes. COVID/Flu/RSV negative on 6/3/23.   - Oxycodone and dilaudid available for now, please wean as tolerated   - Continue PTA Robaxin QID prn and Flexeril at bedtime prn, continue PTA Elavil at bedtime.   - acetaminophen maximum 2g daily with transaminase elevation    J Tube in place  G Tube in place  Gastroparesis c/b Chronic Nausea  Chronic Constipation   - Regular diet (eating limited amounts of food at home)   - TPN on hold until cultures seen to clear   - TF to resume 6/5 (appreciate nutrition assistance)   - Phenergan and Compazine available for nausea   - Continue PTA Motegrity and scheduled senna (hold for loose stools)   - Scopolamine patch   - Line/drain cares   - Monitor I&O   - Please note that she puts crushed pills through her G tube, all liquid meds and TF through J tube.    Severe Malnutrition  Hypokalemia  Hypomagnesemia  Hypophosphatemia  On TPN (unsure of rate currently, runs for 14 hours each day concurrently with TF) and TF (vivonex rate 30cc/hr x 14 hours daily, starting ~2000 each night). Regular diet as well, though has not eaten x 3 days secondary to feeling very poorly. Mag 1.5, phos 1.8, K 3.5 at admission.   - Aggressively replace electrolytes and continue thiamine   - Nutrition consult for TF to resume (after MRCP result)   - TPN on hold until line infection ruled out   - Continue regular diet as tolerated   - LR ordered for now at low rate    Acute on Chronic Diarrhea  Frequently with loose stools but reports significantly worse past 3 days (no melena or hematochezia). Is having acute on chronic abdominal pain.   - Check for C. Diff and stool  culture panel - will discontinue with no stools 6/6 and alternative infectious source    Chest Pressure  Noted alongside body aches, resolved with dilaudid. EKG without evidence of ST segment elevation or depression. Trop 7, <6.  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 Last A1C 5.5 ~6 months ago.   - BG Q4H with hypoglycemia protocol   - If concern for hyperglycemia, can consider sliding scale insulin   - If hypoglycemia, can start D10 (typical TF rate 30cc/hr so would need this at the very minimum).   - Continue creon with meals    Incidental Lung Nodule  3 mm right upper lobe nodule on series 3 image 103 noted on CTA C/A/P 6/3/23 done at [place].   - Will need follow up imaging    GERD  Candida esophagitis  Finished fluconazole 400 mg suspension daily per G-tube for 21 day course.   - Continue PTA IV protonix in am and pepcid in pm     Hypothyroidism   - Continue PTA levothyroxine    H/o Migraines  Uses Botox injection and Zomig at home.   - Please resume PTA Zomig if repeat troponin negative (if no concern for ACS)     Seasonal Allergies   - Continue Zyrtec and Singulair         Diet: NPO per Anesthesia Guidelines for Procedure/Surgery Except for: Meds    DVT Prophylaxis: Pneumatic Compression Devices  [place] Catheter: Not present  Lines: PRESENT      Port A Cath Single 12/09/22 Right Chest wall-Site Assessment: WDL      Cardiac Monitoring: None  Code Status: No CPR- Pre-arrest intubation OK      Clinically Significant Risk Factors          # Hypocalcemia: Lowest Ca = 8.3 mg/dL in last 2 days, will monitor and replace as appropriate   # Hypomagnesemia: Lowest Mg = 1.5 mg/dL in last 2 days, will replace as needed   # Hypoalbuminemia: Lowest albumin = 3.2 g/dL at 6/5/2023  7:03 AM, will monitor as appropriate                     Disposition Plan      Expected Discharge Date: 06/07/2023        Discharge Comments: Delays:  Establish nutrition          Luis Rubalcava DO  Hospitalist Service, GOLD TEAM 8  [facility]  Securely message with [employer] (more info)  Text page via [facility] Paging/Directory   See signed in provider for up to date coverage information  ______________________________________________________________________    Interval History   Admitted yesterday evening. No major events this morning. Feeling quite poorly, mainly with ongoing headache and body aches, nausea/vomiting. Aches focused on low back and bilateral hips but extend through all extremities. Feels more joint than muscle pain. Stools have been soft, no change. She does endorse finding many ticks on her two dogs who live indoors. Otherwise no outdoor activity or travel recently.    Physical Exam   Vital Signs: Temp: 98.8  F (37.1  C) Temp src: Oral BP: 135/85 Pulse: 82   Resp: 16 SpO2: 98 % O2 Device: None (Room air)    Weight: 137 lbs 3.2 oz    Gen: Awake and alert, appears comfortable. Thin.  HEENT: NCAT, sclerae anicteric.  CV: Regular rhythm, normal rate, S1/S2, extremities warm and well perfused, no lower extremity edema.  Pulm: Normal work of breathing, lungs clear to auscultation, no crackles or wheezing.  GI: Nontender, nondistended, soft. +bowel sounds. G and J tube sites appear clean and dry.  Skin: Warm, dry, no jaundice. No rash on trunk.  Neuro: AO, speech normal, moves all extremities symmetrically.  Psych: Mood is good, affect is congruent.      Medical Decision Making       50 MINUTES SPENT BY ME on the date of service doing chart review, history, exam, documentation & further activities per the note.      Data     I have personally reviewed the following data over the past 24 hrs:    14.4 (H)  \   10.9 (L)   / 182     139 106 5.9 (L) /  163 (H)   3.8 24 0.41 (L) \       ALT: 306 (H) AST: 143 (H) AP: 222 (H) TBILI: 0.4   ALB: 3.2 (L) TOT PROTEIN: 5.7 (L) LIPASE: N/A       Trop: N/A BNP: N/A       TSH: 0.55  T4: N/A A1C: N/A       Procal: 5.08 (HH) CRP: 96.20 (H) Lactic Acid: N/A

## 2023-06-06 NOTE — PROGRESS NOTES
Gastroenterology Panc Bili service - Progress note     Assessment and plan     Dinora Mcghee is a 58 year old female with hx of chronic pancreatitis (ERCP associated) s/p TPIAT 2016 c/b DM, gastroparesis c/b nausea,vomiting and feeding tube dependence with need for multiple FT exchanges, chronic malnutrition on TF and TPN, S/p CCY presented to ED with fever, abdominal pain and whole body ache found to have mixed transaminitis and K pneumoniae bacteremia.6/5 MRCP unremarkable for biliary obstruction. LFT trending down in the setting of abx treatment.     # Mixed transaminitis c/f cholestasis of sepsis   # Chronic pancreatitis s/p TPIAT   # Klebsiella pneumoniae bacteremia on Meropenam  Presented with fever, worsening abdominal pain. CT without significant pathology (but CTA did not comment on the bile duct). New elevated liver enzymes could be related to the new initiation of TPN, or could be a presentation of cholangitis given accompanied by fever and also with this new gram negative bacilli which is a typical pathogen for cholangitis in the setting of TPIAT. Ultrasound did not show dilated common bile duct, but with limited visualization. Will further assess possible cholangitis/bile duct dilation with MRCP.    Reviewed MRCP with staff, no evidence of anastomotic or upstream biliary/ intra hepatic obstruction. No indication for ERCP at this time. In the setting of improvement of LFTs with abx suspect etiology is cholestasis of sepsis.       # Gastroparesis c/b TF and TPN dependence   # Delayed small bowel transit   # Visceral hypersensitivity   # Malnutrition   - Continue amitriptyline for chronic abdominal pain with component of visceral hypersensitivity  - Antibiotics and line management per primary team  - Agree with holding TPN through the current port line     We will continue to follow LFTs.     Above plan discussed with Dr. Dodson, GI staff physician.    Cordelia Solis MD PGY1  "    Subjective:  Overnight notes reviewed. No acute events. Patient denies nausea, vomiting  Now although overnight needed a scopolamine patch. Reports diffuse abdominal pain. No diarrhea overnight or this AM.     Objective:  /71 (BP Location: Left arm)   Pulse 76   Temp 98.2  F (36.8  C) (Oral)   Resp 16   Ht 1.727 m (5' 8\")   Wt 62.2 kg (137 lb 3.2 oz)   SpO2 94%   BMI 20.86 kg/m     General: overall well appearing. Answers questions appropriately. NAD   GI: Midline surgical scar present. G tube draining yellow gastric contents . PEG- J tube present and capped. Diffusely tender to palpation non distended abdomen. Faint bowel sounds present   Chest: R Upper port present.   Card: RRR   Pulm: Vesicular breath sounds   Extremities: Non edematous not jaundiced.     6/5/2023 MRCP   1. The common bile duct appears somewhat narrowed near the  bifurcation, however there is no upstream intrahepatic biliary  dilatation to suggest obstruction. No definite findings to suggest  acute cholangitis.  2. Heterogeneous enhancement of the liver on arterial phase sequence  with associated periportal edema. Findings are nonspecific but can be  seen with acute hepatitis. Alternatively, periportal edema may be due  to overall third spacing of fluid with ascites and some body wall  edema also noted. Correlation with liver function tests.  3. Small volume ascites.    "

## 2023-06-06 NOTE — PLAN OF CARE
"Assumed cares [id number]     /79 (BP Location: Left arm)   Pulse 83   Temp 98.4  F (36.9  C) (Oral)   Resp 18   Ht 1.727 m (5' 8\")   Wt 60.4 kg (133 lb 3.2 oz)   SpO2 99%   BMI 20.25 kg/m       Pain: Patient has back and neck pain. PRN oxy and dilaudid given.    Neuro: A&Ox4.    Respiratory: Lungs clear and equal, on RA.   Cardiac/Neurovascular: HR and pulse WDL. No numbness or tingling reported.   GI/: Adequate urine output. No BM reported.   Nutrition: Regular diet.   Activity: Independent.   Skin: Redness under J tube.  Lines: Right chest port (SL). G tube (right) J tube (left)  Events this shift: Continue with pain control. Patient nauseous today, given compazine and phenergan.      Plan: Continue with plan of care.        Goal Outcome Evaluation:      Plan of Care Reviewed With: patient    Overall Patient Progress: no changeOverall Patient Progress: no change    Outcome Evaluation: Continue [facility] plan of care.      "

## 2023-06-06 NOTE — PHARMACY-ADMISSION MEDICATION HISTORY
Pharmacy Intern Admission Medication History    Admission medication history is complete. The information provided in this note is only as accurate as the sources available at the time of the update.    Medication reconciliation/reorder completed by provider prior to medication history? No    Information Source(s): Patient and surescipt via in-person    Pertinent Information:    Pt reports she is up-to-date on flu vaccine but may need pneumonia and singles vaccines.   Pt is on home infusion for NS, TPN (4 units of insulin), Protonix,   Pt is on Botox every 3 months ( last dose 3/23 and next dose 6/19)   Pt is titration amitriptyline and is presently taking 40 mg daily   Pt takes creon when she eat PO ( last dose is 6/1)   Pt had last freestyle yandy 3 sensor yesterday (took it off per [facility]) and waiting for  to bring another for a replacement.   Pt prefers honey to manage low blood sugar as oppose to glucose 40% gel   Pt reports that her LR was switched to NS due to compatibility issues with TPN   Pt has the scopolamine patch replaced yesterday    Changes made to PTA medication list:    Added: Medical cannabis    Deleted: amitriptyline, ketorolac, melatonin LR solution Freestyle Yandy 2 sensor     Changed: none    Allergies reviewed with patient and updates made in EHR: yes    Medication History Completed By: ANA JIMENEZ 6/6/2023 1:39 PM    Prior to Admission medications    Medication Sig Last Dose Taking? Auth Provider Long Term End Date   acetaminophen (TYLENOL) 325 MG/10.15ML solution 20.3 mLs (650 mg) by Per J Tube route every 6 hours as needed for mild pain or fever Past Week at unknown Yes Elver Bauer MD     amitriptyline (ELAVIL) 10 MG tablet Take 2 tablets (20 mg) by mouth At Bedtime Take 2 tabs for 5 days then increase to 3 tabs for 5 days then increase to 4 tabs for 5 days.  Patient taking differently: Take 40 mg by mouth At Bedtime Take 2 tabs for 5 days then increase to 3 tabs  for 5 days then increase to 4 tabs for 5 days. Past Week at unknown Yes Vijaya Genao MD Yes    blood glucose (PAT CONTOUR NEXT) test strip Use to test blood sugar 6 times daily or as directed. Unknown at DME Yes Gloria Melissa MD     blood glucose monitoring (PAT MICROLET) lancets Use to test blood sugar 6-8 times daily or as directed. Unknown at DME Yes Gloria Melissa MD     cetirizine (ZYRTEC) 10 MG tablet 1 tablet (10 mg) by Per G Tube route daily Past Week at UNKNOWN Yes Santiago Lomax MD No    Continuous Blood Gluc Sensor (FREESTYLE LISA 3 SENSOR) MISC 1 each every 14 days 6/5/2023 at Does not have on Yes Gloria Melissa MD     cyclobenzaprine (FLEXERIL) 10 MG tablet 1 tablet (10 mg) by Per G Tube route 2 times daily as needed for muscle spasms  Patient taking differently: 10 mg by Per G Tube route daily Past Week at unknown Yes Elver Bauer MD No    diphenhydrAMINE (BENADRYL ALLERGY) 25 MG capsule Take 1 capsule (25 mg) by mouth every 6 hours as needed for itching or allergies  Patient taking differently: Take 25 mg by mouth every 6 hours as needed for itching or allergies 10 mL per G-tube Past Week at unknown Yes Nestor Phoenix MD     famotidine (PEPCID) 40 MG/5ML suspension 2.5 mLs (20 mg) by Per J Tube route daily Past Week at unknown Yes Mandeep Park MD No    glucagon 1 MG kit Give 0.1 to 0.15mg( 10-15 units on Insulin sryinge) subcutaneous  every 15 minutes PRN for hypoglycemia. Remix new kit q24hr. Needs up to 3 kit/week. Past Week at unknown Yes Gloria Melissa MD Yes    glucose 40 % GEL gel Take 15-30 g by mouth every 15 minutes as needed for low blood sugar More than a month at unknown Yes Melissa Sood PA-C     ibuprofen (ADVIL/MOTRIN) 400 MG tablet Take 1 tablet (400 mg) by mouth every 6 hours as needed for moderate pain  Patient taking differently: 400 mg by Per G Tube route every 6 hours as needed for moderate pain Past Week at unknown Yes  "Altagracia العلي PA-C No    insulin syringe-needle U-100 (30G X 1/2\" 0.3 ML) 30G X 1/2\" 0.3 ML miscellaneous Use 3 syringes daily or as directed. Unknown at DME Yes Gloria Melissa MD     levothyroxine (SYNTHROID/LEVOTHROID) 137 MCG tablet 137 mcg by Per G Tube route daily Past Week at UNKNOWN Yes Unknown, Entered By History Yes    methocarbamol (ROBAXIN) 750 MG tablet 1 tablet (750 mg) by Per Feeding Tube route 4 times daily Past Week at UNKNOWN Yes Monika Mahajan MD     montelukast (SINGULAIR) 10 MG tablet 10 mg by Per G Tube route At Bedtime Past Week at unknown Yes Unknown, Entered By History No    Nutritional Supplements (BOOST HIGH PROTEIN) LIQD After above baseline labs are drawn, give: 6 mL/kg to maximum of 360 mL; the beverage is to be consumed within 5 minutes. More than a month at unknown Yes Gloria Melissa MD No    pantoprazole (PROTONIX) 40 mg IV push injection Inject 40 mg into the vein daily (with breakfast) 6/3/2023 at unknown Yes Santiago Lomax MD     promethazine-phenylephrine (PROMETHAZINE VC) 6.25-5 MG/5ML syrup 20 mLs (25 mg) by Per Feeding Tube route nightly as needed for nausea - Per Feeding Tube Past Week at unknown Yes Elver Bauer MD     prucalopride succinate (MOTEGRITY) 2 MG tablet Take 1 tablet (2 mg) by mouth daily  Patient taking differently: Take 2 mg by mouth daily Per G-tube Past Week Yes Vijaya Genao MD     scopolamine (TRANSDERM) 1 MG/3DAYS 72 hr patch Place 1 patch onto the skin every 72 hours 6/5/2023 at patch on Yes Santiago Lomax MD No    sennosides (SENOKOT) 8.8 MG/5ML syrup 5 mLs by Per J Tube route 2 times daily Past Week Yes Elver Bauer MD     thiamine (B-1) 100 MG tablet 1 tablet (100 mg) by Per J Tube route daily Past Week at unknown Yes Santiago Lomax MD     UNABLE TO FIND Medical cannabis. Take 1 lozenge 4 MCT to 1 THC by mouth daily for pain/Nausea Past Week at unknown Yes Unknown, Entered By History     zinc oxide - " white petrolatum (CRITIC-AID THICK MOIST BARRIER) 20-51% PSTE topical paste Apply 71 g topically every hour as needed for rash (Under J tube bumper when needed for skin protection) Past Week at unknown Yes Josse Dao MD     amylase-lipase-protease (CREON) 07840-51521 units CPEP per EC capsule Take 3-4 capsules by mouth 3 times daily (with meals) With oral meals 6/1/2023 at unknown  Rosanne Marti PA-C No    estradiol (VAGIFEM) 10 MCG TABS vaginal tablet INSERT 1 TABLET(10 MCG) VAGINALLY 2 TIMES A WEEK  Patient taking differently: INSERT 1 TABLET(10 MCG) VAGINALLY every third day 6/1/2023 at unknown  Juan Jose Gomez MD     parenteral nutrition - PTA/DISCHARGE ORDER The TPN formula will print on the After Visit Summary Report. 6/3/2023 at unknown  Santiago Lomax MD     Sharps Container (BD SHARPS ) MISC 1 Container as needed   Melissa Sood PA-C     STATIN NOT PRESCRIBED (INTENTIONAL) Previous liver issues, risks vs benefits felt to not warrant statin.    Discussed Oct 2022 visit   Rossi Andrade MD Yes    ZOLMitriptan (ZOMIG-ZMT) 5 MG ODT Take 1 tablet (5 mg) by mouth at onset of headache for migraine May repeat in 2 hours. Max 2 tablets/24 hours.  Patient taking differently: Take 5 mg by mouth at onset of headache for migraine Dissolve tablet in the mouth. May repeat in 2 hours. Max 2 tablets/24 hours.  at unknown  Rachelle العلي APRN CNP Yes

## 2023-06-06 NOTE — PLAN OF CARE
Goal Outcome Evaluation:    Assumed pt cares from [id number]. Pt A/)x4 and is able to make needs known. Vs were stable on room air, pt reported back and head pain which was rated as 8/10. Provided PRN Dilaudid x2, Tylenol x1, Benedryl x1, Robaxin x1, and Oxy x1. Pt voided spontaniusly with no BM occurances. LR is running continous at 75mls/hr. Pt slept throughout the shift. Continue to monitor for pain and provide POC. Pts call light is within reach.                  Outcome Evaluation: LR running @ 75 mls/hr, provided pain management and POC

## 2023-06-06 NOTE — CONSULTS
Care Management Initial Consult    General Information  Assessment completed with: Patient,    Type of CM/SW Visit: Initial Assessment  Primary Care Provider verified and updated as needed: Yes   Readmission within the last 30 days: previous discharge plan unsuccessful   Return Category: Exacerbation of disease  Reason for Consult: discharge planning, care coordination/care conference  Advance Care Planning: Advance Care Planning Reviewed: present on chart, verified with patient, no concerns identified       Communication Assessment  Patient's communication style: spoken language (English or Bilingual)    Hearing Difficulty or Deaf: no   Wear Glasses or Blind: yes, glasses    Cognitive  Cognitive/Neuro/Behavioral: WDL                      Living Environment:   People in home: spouse     Current living Arrangements: house      Able to return to prior arrangements: yes    Family/Social Support:  Care provided by: self  Provides care for: no one  Marital Status:   Support System:  (Norris), Children, Other (specify) (friends, community members)      Description of Support System: Supportive, Involved    Support Assessment: Adequate family and caregiver support, Adequate social supports    Current Resources:   Patient receiving home care services: Yes  Skilled Home Care Services:  (Skilled Nursing, Home Infusion)  Community Resources:    Equipment currently used at home: other (see comments) (TF, TPN, Gtube and Jtube supplies)  Supplies currently used at home:  (G tube & J tube supplies, TF, TPN)    Employment/Financial:  Employment Status: other (see comments) (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  No    Lifestyle & Psychosocial Needs:  Social Determinants of Health     Tobacco Use: Medium Risk (5/25/2023)    Patient  "History      Smoking Tobacco Use: Former      Smokeless Tobacco Use: Unknown      Passive Exposure: Not on file   Alcohol Use: Not on file   Financial Resource Strain: Not on file   Food Insecurity: Not on file   Transportation Needs: Not on file   Physical Activity: Not on file   Stress: Not on file   Social Connections: Not on file   Intimate Partner Violence: Not on file   Depression: At risk (4/25/2023)    PHQ-2      PHQ-2 Score: 4   Housing Stability: Not on file       Functional Status:  Prior to admission patient needed assistance:   Dependent ADLs: Independent  Dependent IADLs: Independent  Assesssment of Functional Status: Not at baseline with ADL Functioning    Mental Health Status:  Mental Health Status: No Current Concerns    Mental Health Management: Individual Therapy    Chemical Dependency Status:  Chemical Dependency Status: No Current Concerns           Values/Beliefs:  Spiritual, Cultural Beliefs, Rastafari Practices, Values that affect care: no               Additional Information:  Background:  Per H & P, \"Dinora Mcghee is a 58 year old female patient with a complicated past medical history significant for gallstone s/p cholecystectomy (1996), s/p multiple ERCP c/b recurrent pancreatitis, chronic abdominal pain, gastroparesis c/b chronic nausea and vomiting, chronic intermittent pancreatitis, s/p splenectomy and appendectomy, chronic malnutrition/FTT on G-tube s/p multiple G/J-tube adjustments/laparotomy s/p TPIT (2016), Hx of GERD, hypothyroidism, uterine tumor s/p hysterectomy now on regular diet, TPN and TF at home who presented to [facility] ED secondary to concerns regarding fever and body aches since 6/2/23.\"    Progress:   met with patient at bedside and introduce self and role of the hospital .  Patient reported that she was a [employer]  for 5 years and her daughter just graduated w/ MSW and is a SW at [employer].  SW confirmed ACP docs were up to " date, PCP is Dr. Zhang. Patient reported she lives in [place] with her , Norris, who is a [employer] .  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  Patient reported that she is typically a very active person and does things like fundraising for the [employer] and staying in touch with her college friends. Patient reported feeling sad that she could not spend time with her daughter this week prior to her daughter staring a new job as a SW at [employer].  Patient reported that she has a good support system, but that she has learned to keep good boundaries with people who don't understand the complexity of her medical needs.      Patient reported that she receives her Tube Feed and TPN supplies through [facility] Nursing through [facility].  Patient did not anticipate any further social work needs at discharge, just a desire to know what is going on with her medically so she can enjoy summer plans and not be in pain.  Patient reported any visits from volunteer dogs would be very welcome to lift her spirits.     Plan:  Social work will continue to follow for discharge planning, including updating [facility] and [facility] for resumption services.   spoke with Makayla Ascencio from [facility] who stated that patient was on promethazine 25 mg IV daily as needed and 1L 0.9% sodium chloride IV daily as needed as well.  Makayla requested that the Provider add this to her med list when they do med rec.       paged and spoke with [facility] Provider, Dr. Marcela Blanca, and updated provider with [facility]fusion request for med recs.  Provider stated that patient could potentially discharge tomorrow depending on how she is feeling clinically.   updated Makayla  Sonu from [facility] of possible discharge.    [facility] Liaison, Makayla Ascencio  [address] [place] [address]  [phone]    [facility]  [facility] [place] [address] [phone]      JAIDA Caraballo, [facility]  [address]   [email or web address]  Office: [phone]   Pager: [phone]

## 2023-06-06 NOTE — PROGRESS NOTES
[facility]    Medicine Progress Note - Hospitalist Service, GOLD TEAM 8    Date of Admission:  6/4/2023    Assessment & Plan    Dinora Mcghee is a 58 year old woman with history including gallstone s/p cholecystectomy (1996), multiple ERCP c/b recurrent pancreatitis, gastroparesis c/b chronic nausea and vomiting, chronic malnutrition on TF and TPN presented to [facility] ED on 6/4/23 with two days of fever and body aches.     Sepsis  Klebsiella pneumonia bacteremia (2 of 2 Bcx 6/4/23)  Elevated Lactate  Body Aches  Asplenia  Onset of fevers (highest 101.7F day of adm), widespread body aches (head to toes, no meningeal signs), rising LFTs, poor appetite, acute on chronic abdominal pain and worsening of baseline diarrhea. Lactate 2.7-->1.3 with fluids. WBC 8.8 with no left shift. Procal 6.33. UA no e/o infection. CXR no infiltrate. CTA C/A/P at [place] 6/3/23 with no acute findings. CLABSI vs cholangitis vs other.   - Concern for possible line infection: blood cultures pending, continue Ertapenem and Vancomycin started in ED for now. IVF hydration overnight.    - Initially concern for possible tick-born illness (no known tick bites but has found several on her dogs): Labs sent for lyme (Ab neg), ehrlichia, babesia.   - Beta D glucan pending  - Urine culture pending   - Transplant ID consulted  - s/p meropenem; Continue ertapenem 1g q24 hrs (6/6-6/18), plan for 14 day course via port  - Repeat blood cultures tomorrow 6/6  - Surveillance cultures 7d after antibiotics completed  - Stop vancomycin     Transaminitis - improving  Noted rising LFTs as of ~3 weeks ago possibly related to TPN. Lipase and total bili WNL. . LFTs are higher than documented at [facility] in [place] day prior to admission.   - Hep A (IgM neg), B (pdg), C (IgG neg)   - INR normal, suggesting intact synthetic function   - MRCP to r/o cholangitis, negative; no ERCP per GI   - ID abx per above x 14 days  via port   - GI consulted     Acute on Chronic Abdominal Pain  Related to above complicated GI history. Seeing [facility] Pain specialists and being evaluated for spinal cord stimulator. Patient reporting typically has abdominal pain related to gastroparesis, but has been significantly worse particularly on the left side in addition to wide spread body aches from her head to her toes. COVID/Flu/RSV negative on 6/3/23.   - Oxycodone and dilaudid available for now, please wean as tolerated   - Continue PTA Robaxin QID prn and Flexeril at bedtime prn, continue PTA Elavil at bedtime.   - acetaminophen maximum 2g daily with transaminase elevation     J Tube in place  G Tube in place  Gastroparesis c/b Chronic Nausea  Chronic Constipation   - Regular diet (eating limited amounts of food at home)   - TPN on hold until cultures seen to clear   - TF to resume 6/5 (appreciate nutrition assistance)   - Phenergan and Compazine available for nausea   - Continue PTA Motegrity and scheduled senna (hold for loose stools)   - Scopolamine patch   - Line/drain cares   - Monitor I&O   - Please note that she puts crushed pills through her G tube, all liquid meds and TF through J tube.     Severe Malnutrition  Hypokalemia  Hypomagnesemia  Hypophosphatemia  On TPN (unsure of rate currently, runs for 14 hours each day concurrently with TF) and TF (vivonex rate 30cc/hr x 14 hours daily, starting ~2000 each night). Regular diet as well, though has not eaten x 3 days secondary to feeling very poorly. Mag 1.5, phos 1.8, K 3.5 at admission.   - Aggressively replace electrolytes and continue thiamine   - Nutrition consult for TF to resume (after MRCP result)   - TPN on hold until line infection ruled out   - Continue regular diet as tolerated   - LR ordered for now at low rate     Acute on Chronic Diarrhea  Frequently with loose stools but reports significantly worse past 3 days (no melena or hematochezia). Is having acute on chronic abdominal  pain.   - Check for C. Diff and stool culture panel - will discontinue with no stools 6/6 and alternative infectious source     Chest Pressure  Noted alongside body aches, resolved with dilaudid. EKG without evidence of ST segment elevation or depression. Trop 7, <6.  - Stop telemetry     History of pancreatectomy related DM  S/p islet cell transplant 12/28/2016  Typically there is insulin in her TPN. Last A1C 5.5 ~6 months ago.   - BG Q4H with hypoglycemia protocol   - If concern for hyperglycemia, can consider sliding scale insulin   - If hypoglycemia, can start D10 (typical TF rate 30cc/hr so would need this at the very minimum).   - Continue creon with meals     Incidental Lung Nodule  3 mm right upper lobe nodule on series 3 image 103 noted on CTA C/A/P 6/3/23 done at [place].   - Will need follow up imaging     GERD  Candida esophagitis  Finished fluconazole 400 mg suspension daily per G-tube for 21 day course.   - Continue PTA IV protonix in am and pepcid in pm     Hypothyroidism   - Continue PTA levothyroxine     H/o Migraines  Uses Botox injection and Zomig at home.   - Please resume PTA Zomig if repeat troponin negative (if no concern for ACS)      Seasonal Allergies   - Continue Zyrtec and Singulair       Diet: NPO per Anesthesia Guidelines for Procedure/Surgery Except for: Meds    DVT Prophylaxis: Pneumatic Compression Devices  P[place]ra Catheter: Not present  Lines: PRESENT      Port A Cath Single 12/09/22 Right Chest wall-Site Assessment: WDL      Cardiac Monitoring: None  Code Status: No CPR- Pre-arrest intubation OK      Clinically Significant Risk Factors          # Hypocalcemia: Lowest Ca = 8.3 mg/dL in last 2 days, will monitor and replace as appropriate   # Hypomagnesemia: Lowest Mg = 1.5 mg/dL in last 2 days, will replace as needed   # Hypoalbuminemia: Lowest albumin = 3.2 g/dL at 6/5/2023  7:03 AM, will monitor as appropriate                     Disposition Plan     Expected Discharge Date: 06/07/2023         Discharge Comments: Delays: Establish nutrition          Marcela Meade MD  Hospitalist Service, GOLD TEAM 8  [facility]  Securely message with [employer] (more info)  Text page via [employer] Paging/Directory   See signed in provider for up to date coverage information  ______________________________________________________________________    Interval History   Doing ok  ID plan in place - 14 day ertapenem ending 6/18  No chest pain/sob/NVD    Physical Exam   Vital Signs: Temp: 98.2  F (36.8  C) Temp src: Oral BP: 127/71 Pulse: 76   Resp: 16 SpO2: 94 % O2 Device: None (Room air)    Weight: 137 lbs 3.2 oz    General: No acute distress, breathing comfortably on room air  Neuro: EOMI, PERRLA. Facial muscles symmetric, strength/sensation grossly intact.  HEENT: Anicteric sclera. Oropharynx is clear. No lymphadenopathy.  Chest/Lungs: No accessory respiratory muscle use. Adequate air movement throughout. CTAB.  CV: Normal rate, regular rhythm. nl S1/S2. No m/r/g. Cap refill &lt;2s.  Abd: BS+. Soft, NTND.  Ext: Warm, well-perfused. Strong distal pulses.      Medical Decision Making       40 MINUTES SPENT BY ME on the date of service doing chart review, history, exam, documentation & further activities per the note.      Data     I have personally reviewed the following data over the past 24 hrs:    8.8  \   11.1 (L)   / 184     139 103 3.5 (L) /  132 (H)   3.7 26 0.46 (L) \       ALT: 221 (H) AST: 67 (H) AP: 210 (H) TBILI: 0.5   ALB: 3.3 (L) TOT PROTEIN: 6.0 (L) LIPASE: N/A       Procal: N/A CRP: N/A Lactic Acid: N/A         Imaging results reviewed over the past 24 hrs:   Recent Results (from the past 24 hour(s))   US Abdomen Limited    Narrative    EXAMINATION: Limited Abdominal Ultrasound, 6/5/2023 10:32 AM     COMPARISON: 5/9/2023, 1/12/2023    HISTORY: Focus to hepatobiliary/RUQ; sepsis      FINDINGS:   Fluid: Equivocal trace perihepatic ascites.  No pleural  effusion.    Liver: The liver demonstrates diffusely echogenic and heterogeneous  parenchyma, and is enlarged measuring 18.9 cm in craniocaudal  dimension. There is no focal mass. Patent main portal vein with flow  directed towards liver.    Gallbladder: Cholecystectomy. Normal caliber and intrahepatic biliary  system.    Bile Ducts: The common bile duct measures approximately 2 mm in  diameter, although difficult to visualize with ultrasound.    Pancreas: Visualized portions of the head and body of the pancreas are  unremarkable.     Kidney: The right kidney measures 12.2 cm long. There is no  hydronephrosis, mass lesion or obstructing calculus. Right kidney  cortex is diffusely prominent and echogenic.      Impression    IMPRESSION:   1.  Sonographic evidence of intrinsic hepatic parenchymal disease  including hepatic steatosis.  No focal liver lesion. Equivocal trace  perihepatic ascites.     2.  Echogenic right kidney compatible with medical renal disease.  3. Cholecystectomy.  Common bile duct measures approximately 2 mm in  diameter, although difficult to visualize with ultrasound.       I have personally reviewed the examination and initial interpretation  and I agree with the findings.    CATALINA PAYNE MD         SYSTEM ID:  [id number]   MR Abdomen MRCP w/o & w Contrast    Narrative    MRI ABDOMEN    CLINICAL HISTORY:  History of biliary obstruction status post  cholecystectomy, ERCP/stent, admitted with sepsis, exclude cholangitis    TECHNIQUE:  Images were acquired with and without intravenous contrast  through the abdomen. The following MR images were acquired: TrueFISP,  multiplanar T2 weighted, axial T1 in/out of phase, axial fat-saturated  T1, diffusion-weighted. Multiplanar T1-weighted images with fat  saturation were before contrast administration and at multiple time  points following the administration of intravenous contrast. Contrast  dose: 6 mL gadavist    FINDINGS:    Comparison study: CT  5/19/2023, 1/12/2023    Liver: Heterogeneous enhancement of the liver on the arterial phase  with mild periportal edema. No focal hepatic mass. Homogenous  enhancement of the liver parenchyma on the portal venous and delayed  phases. No hepatic steatosis or evidence of iron overload. The common  bile duct and intrahepatic biliary system do not appear dilated.  Somewhat narrowed appearance of the common bile duct near the  bifurcation without obvious obstructing mass. Postcontrast, the  biliary tree does not appear significantly hyperemic and no definite  thickening of the common bile duct is appreciated.     Gallbladder: Cholecystectomy.    Spleen: Splenectomy.    Kidneys: No focal mass. No hydronephrosis.    Adrenal glands: Unremarkable.    Pancreas: Pancreatectomy.    Bowel: Percutaneous gastrostomy and jejunostomy tubes. No obstruction.    Lymph nodes: Unremarkable.    Blood vessels: The aorta and portal vein are patent.    Lung bases: Small bilateral pleural effusions.    Bones and soft tissues: Hemangioma suspected in the L2 vertebral body  on the right, series 4 image 17, stable from prior CT.    Mesentery and abdominal wall: Minor body wall edema.    Ascites: Small volume ascites.      Impression    IMPRESSION:  1. The common bile duct appears somewhat narrowed near the  bifurcation, however there is no upstream intrahepatic biliary  dilatation to suggest obstruction. No definite findings to suggest  acute cholangitis.  2. Heterogeneous enhancement of the liver on arterial phase sequence  with associated periportal edema. Findings are nonspecific but can be  seen with acute hepatitis. Alternatively, periportal edema may be due  to overall third spacing of fluid with ascites and some body wall  edema also noted. Correlation with liver function tests.  3. Small volume ascites.    I have personally reviewed the examination and initial interpretation  and I agree with the findings.    MACRINA CORRIGAN MD          SYSTEM ID:  [id number]     [employer]   Lab 06/06/23  0549 06/06/23  0417 06/06/23  0018 06/05/23  0834 06/05/23  0703 06/05/23  0702 06/04/23  2231 06/04/23  1348   WBC 8.8  --   --   --   --  14.4*  --  8.8   HGB 11.1*  --   --   --   --  10.9*  --  11.8   MCV 95  --   --   --   --  97  --  96     --   --   --   --  182  --  164   INR  --   --   --   --   --   --   --  1.15     --   --   --  139  --   --  140   POTASSIUM 3.7  --   --   --  3.8  --   --  3.5   CHLORIDE 103  --   --   --  106  --   --  105   CO2 26  --   --   --  24  --   --  21*   BUN 3.5*  --   --   --  5.9*  --   --  11.0   CR 0.46*  --   --   --  0.41*  --   --  0.50*   ANIONGAP 10  --   --   --  9  --   --  14   KASSI 8.7  --   --   --  8.3*  --   --  8.9   * 122* 107*   < > 186*  --    < > 192*   ALBUMIN 3.3*  --   --   --  3.2*  --   --  3.6   PROTTOTAL 6.0*  --   --   --  5.7*  --   --  6.2*   BILITOTAL 0.5  --   --   --  0.4  --   --  0.7   ALKPHOS 210*  --   --   --  222*  --   --  281*   *  --   --   --  306*  --   --  434*   AST 67*  --   --   --  143*  --   --  305*   LIPASE  --   --   --   --   --   --   --  6*    < > = values in this interval not displayed.

## 2023-06-06 NOTE — PROGRESS NOTES
[facility]  Transplant Infectious Disease Progress Note     Patient:  Dinora Mcghee, Date of birth 1965, Medical record number [id number]  Date of Visit:  06/06/2023    Recommendations   1. Stop meropenem  2. Start ertapenem 1g IV q24hrs through port, plan for 14 days from first negative blood culture (6/5 as of this note)  3. Weekly labs: CBC w/diff, CMP  4. Will need repeat blood culture x2 sets (1 port, 1 peripheral) 1 week after finishing antibiotic therapy  5. Follow up in ID clinic week of 6/19  6. ID will sign off at this time, please don't hesitate to contact the SOT ID team should further questions arise. Patient may be seen by general ID in the future as she is not on any immunosuppression for auto-islet cell transplant.   - Please call ID if previously collected blood cultures turn positive      Transplant Infectious Disease will sign off.      Holly Stubbs PA-C  Infectious Diseases  Pager [phone]    Primary team:  Place order panel  OPAT Pharmacy ([facility]) Review and ID Care Transition   Place imaging order(s) requested above prior to discharge.  Contact ID teams about missed ID clinic appointments and/or ongoing therapy needs.    Prolonged Parenteral/Oral Antibiotic Recommendations and ID Follow up  This template provides final ID recommendations as of this date.     Infectious Diseases Indication: K.pneumoniae bacteremia    Antibiotic Information  Name of Antibiotic Dose of Antibiotic1 Anticipated duration Effective start date2 End date   ertapenem 1g IV q24hrs 14 days 6/5/23 6/18/23                 1.Dose of antibiotic will need to be renally adjusted if creatinine clearance changes  2.Effective start date is the date of adequate therapy with appropriate spectrum    Method of antibiotic delivery:Port.Is the line being used for another indication besides antimicrobials? Yes At the end of therapy should the line used for antimicrobials be removed or de-accessed? No.  "Selecting \"yes\" will function as written order to remove PICC line or de-access the indwelling line at the end of therapy.    Weekly labs required: CBC with diff and CMP. Dr. Hooker will follow labs at discharge until ID follow up. Fax labs to ID clinic.    Are there pending microbial tests: Yes Cultures     Imaging for ID follow up: ID Imaging: No.     All OPAT discharges will be scheduled with Izabel Becker NP within 2 weeks of discharge unless otherwise specified with another provider. Type of clinic appointment In-Person visit.     ID provider route note: OPAT RN Care Coordinator 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     1. Klebsiella pneumoniae bacteremia  2. Port-a-cath in place   3. TPN use  Blood cultures on arrival (6/4) positive for GNR in 4/4 bottles, K.pneumoniae by verigene. Has been started on meropenem. Repeat blood cultures pending on 6/5. Possible sources most likely GI or port-a-cath, recently started TPN, recent diarrhea (possible GI source), elevated LFTs though US without bile duct dilatation on limited view GI with concern for cholangitis, MRCP without clear evidence of cholangitis. GI symptoms improving. No respiratory or urinary symptoms. Plan for 14 days of therapy for bacteremia from date of blood culture clearance. Will need blood cultures 1 week after completing antibiotics.     4. Elevated LFT  Recently " "started TPN. Elevated LFTs with  on arrival -->67 (6/6),  on arrival --> 210 (6/6), alk phos 281-->210. US without bile duct dilatation on limited view. MRCP with narrowing of common bile duct without obstruction, no definitive acute cholangitis. Plan to maintain anaerobic coverage, unfortunately has allergy to Flagyl, which limites options. Will narrow slightly to Ertapenem.     5. Ticks found on pet dog  Lyme screen negative, anaplsama/ehrlichia and babesia PCRs pending. Bacteremia is most likely cause of symptoms.    6. Multiple antimicrobial allergies listed  Azithromycin (anaphylaxis- 2008), Zosyn (possible allergy, unclear if it was related to drug or soap in hospital), Bactrim (nausea, vomiting), Flagyl (rash 2018).           Interval History:   Transplants:  12/28/2016 (Islet), Postoperative day:  2351.  Coordinator Alonzo Jolly    Afebrile, VSS. Ongoing nausea without emesis or diarrhea. L sided abdominal discomfort, consistent with previous intermittent pain. Diffuse headache described as \"helmet of pain\" mostly frontotemporal and on top of head. Port was inadvertently de-accessed overnight, has been replaced with dressing changed. No pain, redness or itching at site. Low back and hips are aching. No vision change, neck stiffness, tinnitus/hearing change, dysphagia, chills, rigors, dysuria, flank pain, joint redness or swelling.          Current Medications & Allergies:       amitriptyline  40 mg Oral At Bedtime     cetirizine  10 mg Per G Tube Daily     famotidine  20 mg Per J Tube At Bedtime     levothyroxine  137 mcg Per Feeding Tube QAM AC     lipase-protease-amylase  2-4 capsule Oral TID w/meals     meropenem  1 g Intravenous Q8H     montelukast  10 mg Per G Tube At Bedtime     pantoprazole  40 mg Intravenous Daily with breakfast     prucalopride succinate  2 mg Oral Daily     scopolamine  1 patch Transdermal Q72H    And     scopolamine   Transdermal Q8H     sennosides  5 mL Per J Tube " BID     sodium chloride (PF)  3 mL Intracatheter Q8H     thiamine  100 mg Per Feeding Tube Daily         Allergies   Allergen Reactions     Apple Juice Anaphylaxis     Corticosteroids Other (See Comments)     All oral, IV and injectable steroids are contraindicated (unless in life threatening situations) in Islet Auto transplant recipients. They can cause irreversible loss of islet cell function. Please contact patient's transplant care coordinator ADI Gaffney RN at [phone]/pager [phone] and/or endocrinologist prior to administration.       Depakote [Divalproex Sodium] Other (See Comments)     Chest pain     Zithromax [Azithromycin Dihydrate] Anaphylaxis     Noted in 4/7/08 ER     Bromfenac      Other reaction(s): Headache     Codeine      Other reaction(s): Hallucinations     Darvocet [Propoxyphene N-Apap] Itching     Morphine Nausea and Vomiting and Rash     Nalbuphine Hcl Rash     RASH :nubaine     Zosyn [Piperacillin-Tazobactam In Dex] Rash     Possible allergy, did have a diffuse rash that seemed drug related but could have also been related to soap in the hospital.      Bactrim [Sulfamethoxazole W-Trimethoprim] Other (See Comments) and Nausea and Vomiting     Severely low liver function.     Statins Other (See Comments)     Previous liver issues, risks vs benefits felt to not warrant statin.  Discussed Oct 2022 visit     Tape [Adhesive Tape] Blisters     Tramadol      Zofran [Ondansetron] Other (See Comments)     migraine     Flagyl [Metronidazole] Hives and Rash            Physical Exam:   Ranges for vital signs:  Temp:  [97.8  F (36.6  C)-98.8  F (37.1  C)] 98  F (36.7  C)  Pulse:  [73-84] 73  Resp:  [16-18] 16  BP: (126-135)/(68-85) 129/76  SpO2:  [94 %-98 %] 98 %  Vitals:    06/04/23 1352 06/05/23 1724   Weight: 61.2 kg (135 lb) 62.2 kg (137 lb 3.2 oz)       Physical Examination:  GENERAL:  Awake, alert. Supine in bed. Appears uncomfortable.  HEAD:  Head is normocephalic, atraumatic   EYES:   Eyes have anicteric sclerae without conjunctival injection or discharge.  ENT:  Oropharynx is moist without exudates, thrush, or ulcers. Tongue is midline.   NECK:  Supple. No cervical lymphadenopathy  LUNGS:  Clear to auscultation bilaterally. No accessory muscle use, on room air.  CARDIOVASCULAR:  Regular rate and rhythm with no murmurs, gallops or rubs.  ABDOMEN:  Normal bowel sounds, soft, mildly tender left side of abdomen, non-distended. +PEG and J tube.   SKIN:  No acute rashes.    EXTREMITIES: No joint erythema or swelling of shoulders, elbows, wrists, ankles, knees bilaterally.   NEUROLOGIC:  Grossly nonfocal. Active x4 extremities. No tremor. Speech clear  LINES: R chest port in place without any surrounding erythema or exudate. Dressing intact.          Laboratory Data:     Metabolic Studies       Recent Labs   Lab Test 06/06/23  0958 06/06/23  0549 06/05/23  0834 06/05/23  0703 06/04/23  2231 06/04/23  1553 06/04/23  1348 06/04/23  1343 02/17/22  1244 02/17/22  1115   NA  --  139  --  139  --   --    < >  --    < > 138   POTASSIUM  --  3.7  --  3.8  --   --    < >  --    < > 4.0   CHLORIDE  --  103  --  106  --   --    < >  --    < > 102   CO2  --  26  --  24  --   --    < >  --    < > 29   ANIONGAP  --  10  --  9  --   --    < >  --    < > 7   BUN  --  3.5*  --  5.9*  --   --    < >  --    < > 15   CR  --  0.46*  --  0.41*  --   --    < >  --    < > 0.71   GFRESTIMATED  --  >90  --  >90  --   --    < >  --    < > >90   * 132*   < > 186*   < >  --    < >  --    < > 132*  132*   A1C  --   --   --   --   --   --   --   --   --  5.4   KASSI  --  8.7  --  8.3*  --   --    < >  --    < > 9.6   PHOS  --  2.8  --   --    < >  --    < >  --    < >  --    MAG  --  1.6*  --  1.9   < >  --    < >  --    < >  --    LACT  --   --   --   --   --  1.3  --  2.7*   < >  --    PCAL  --   --   --  5.08*  --   --    < >  --   --   --     < > = values in this interval not displayed.       Hepatic Studies    Recent Labs    Lab Test 06/06/23  0549 06/05/23  0703 06/04/23  1348 05/10/23  0719   BILITOTAL 0.5 0.4 0.7 0.5   DBIL  --   --   --  <0.20   ALKPHOS 210* 222* 281* 125*   PROTTOTAL 6.0* 5.7* 6.2* 7.4   ALBUMIN 3.3* 3.2* 3.6 4.3   AST 67* 143* 305* 43*   * 306* 434* 67*       Hematology Studies      Recent Labs   Lab Test 06/06/23  0549 06/05/23  0702 06/04/23  1348 05/09/23  0724 05/09/23  0059 02/03/23  0716 07/13/21  1731 12/17/20  0739 09/14/20  1529   WBC 8.8 14.4* 8.8 6.0 6.0 11.0   < > 5.0 6.2   ANEU  --   --   --   --   --   --   --  1.5* 2.6   ALYM  --   --   --   --   --   --   --  2.7 2.6   GABRIELE  --   --   --   --   --   --   --  0.5 0.7   AEOS  --   --   --   --   --   --   --  0.3 0.2   HGB 11.1* 10.9* 11.8 12.6 12.1 11.7   < > 13.5 14.3   HCT 34.7* 34.5* 37.0 39.9 38.1 36.3   < > 41.4 42.9    182 164 420 425 320   < > 363 353    < > = values in this interval not displayed.       No results found for: ACD4    Arterial Blood Gas Testing    Recent Labs   Lab Test 12/28/16  1600 12/28/16  1358 12/28/16  1205 12/28/16  1106 12/28/16  1043 12/28/16  0915   PH  --  7.49* 7.48* 7.47* 7.31* 7.45   PCO2  --  35 36 39 58* 41   PO2  --  203* 261* 269* 267* 250*   HCO3  --  26 27 28 29* 28   O2PER 60 60.0 55.0 60.0 60.0 60        Medication levels  No lab results found.    Invalid input(s): AMIK    Inflammatory Markers    Recent Labs   Lab Test 09/16/21  1308 12/29/16  0620 06/06/15  1903   SED 10  --   --    CRP  --  90.0* 71.0*       Immune Globulin Studies     Recent Labs   Lab Test 08/05/21  1544 07/20/21  0943 12/17/20  0741 07/30/20  0903 01/31/20  1021 09/17/19  1722 01/31/17  1310 12/19/16  0800    886 924 956 918 953   < >  --    IGA  --   --   --   --   --   --   --  329   IGG1 437  --  424 399 460 390   < >  --    IGG2 359  --  331 338 403 349   < >  --    IGG3 62  --  64 65 71 64   < >  --    IGG4 91*  --  84 85 87* 86   < >  --     < > = values in this interval not displayed.       Gout Labs     No lab results found.    Body fluid stats    Recent Labs   Lab Test 12/28/16  1518   GS No organisms seen  No WBC's seen         Microbiology:  Fungal testing  No lab results found.    Invalid input(s): HIFUN, FUNGL    Last Culture results with specimen source  Culture   Date Value Ref Range Status   06/05/2023 No growth after 1 day  Preliminary   06/05/2023 No growth after 1 day  Preliminary   06/04/2023 Positive on the 1st day of incubation (A)  Final   06/04/2023 Klebsiella pneumoniae (AA)  Final     Comment:     2 of 2 bottlesSusceptibilities done on previous cultures   06/04/2023 Positive on the 1st day of incubation (A)  Final   06/04/2023 Klebsiella pneumoniae (AA)  Final     Comment:     2 of 2 bottles   05/10/2023 No Growth  Final   05/04/2023 Candida albicans (A)  Final   12/05/2021 2+ Klebsiella pneumoniae (A)  Final   12/05/2021 2+ Klebsiella pneumoniae (A)  Final   12/05/2021 2+ Citrobacter freundii complex (A)  Final   12/05/2021 3+ Streptococcus anginosus (A)  Final     Comment:     This organism is susceptible to ampicillin, penicillin, vancomycin and the cephalosporins. If treatment is required and your patient is allergic to penicillin, contact the microbiology lab within 5 days to request susceptibility testing.   09/23/2021 No Growth  Final   09/23/2021 No Growth  Final   09/19/2021 >100,000 CFU/mL Mixture of urogenital andry  Final     Culture Micro   Date Value Ref Range Status   12/28/2016 No growth  Final   12/28/2016 No growth after 14 days  Final   12/28/2016 No growth  Final   12/28/2016 No growth after 14 days  Final   12/02/2016 No growth  Final   06/03/2015 No growth  Final   06/03/2015 No growth  Final   04/04/2013 No Beta Streptococcus isolated  Final   (    Last check of C difficile  C Diff Toxin B PCR   Date Value Ref Range Status   08/08/2016  NEG Final    Negative  Negative: Clostridium difficile target DNA sequences NOT detected, presumed   negative for Clostridium difficile  toxin B or the number of bacteria present   may be below the limit of detection for the test.   FDA approved assay performed using [employer] GeneXpert real-time PCR.   A negative result does not exclude actual disease due to Clostridium difficile   and may be due to improper collection, handling and storage of the specimen or   the number of organisms in the specimen is below the detection limit of the   assay.         Infection Studies to assess Diarrhea No lab results found.    Virology:  Coronavirus-19 testing    Recent Labs   Lab Test 06/07/22  1625 01/31/22  0924 01/27/22  1352 12/02/21  1355 11/27/21  1848 10/18/21  1525 09/16/21  1628 09/10/21  1757   AGGEB70KIS  --   --   --   --  Negative Negative  --  Negative   COVIDPCREXT Undetected Undetected Undetected   < >  --   --    < >  --    SOUREXT Swab, Nasopharynx Swab, Nasopharynx Swab, Nasopharynx   < >  --   --    < >  --     < > = values in this interval not displayed.       Respiratory virus testing    Recent Labs   Lab Test 02/26/20  1205   AFLU Positive*   BFLU Negative       CMV viral loads  No lab results found.    CMV resistance testing  No lab results found.    No results found for: H6RES    No results found for: EBRES, EBVRESINST    No results found for: 39375, BKRES    Parvovirus Testing  No lab results found.    Invalid input(s): PRVRES    Adenovirus Testing  No lab results found.    Invalid input(s): ADENAB, ADENOVIRUS, ADQT    Urine Studies     Recent Labs   Lab Test 06/04/23  1624 05/10/23  1812 12/19/22  0700 12/03/21  1412 09/19/21  1808 09/08/21  0314   URINEPH 6.0 7.0 6.0 7.0 5.0 5.5   NITRITE Negative Negative Negative Negative Negative Negative   LEUKEST Negative Large* Negative Negative Moderate* Trace*   WBCU 1 28*  --  <1 10* 3       CSF testing   No lab results found.    Invalid input(s): CADAM, EVPCR, ENTPCR, ENTEROVIRUS    Imaging:  MA Screen Bilateral w/Ankit  Narrative: BILATERAL FULL FIELD DIGITAL SCREENING MAMMOGRAM WITH  TOMOSYNTHESIS    Performed on: 6/2/23    Compared to: 05/12/2022, 09/14/2020, 09/13/2019, and 09/05/2018    Technique:  This study was evaluated with the assistance of Computer-Aided   Detection.  Breast Tomosynthesis was used in interpretation.    Findings: The breasts have scattered areas of fibroglandular density.    There is no radiographic evidence of malignancy.   Impression: IMPRESSION: ACR BI-RADS Category 1: Negative    RECOMMENDED FOLLOW-UP: Annual routine screening mammogram    The results and recommendations of this examination will be communicated   to the patient.    Antoinette Oleary MD

## 2023-06-07 PROBLEM — K83.09 CHOLANGITIS (H): Status: ACTIVE | Noted: 2023-06-07

## 2023-06-07 LAB
A PHAGOCYTOPH DNA BLD QL NAA+PROBE: NOT DETECTED
ALBUMIN SERPL BCG-MCNC: 3.6 G/DL (ref 3.5–5.2)
ALP SERPL-CCNC: 197 U/L (ref 35–104)
ALT SERPL W P-5'-P-CCNC: 177 U/L (ref 10–35)
ANION GAP SERPL CALCULATED.3IONS-SCNC: 11 MMOL/L (ref 7–15)
AST SERPL W P-5'-P-CCNC: 44 U/L (ref 10–35)
B MICROTI DNA BLD QL NAA+PROBE: NOT DETECTED
BABESIA DNA BLD QL NAA+PROBE: NOT DETECTED
BACTERIA UR CULT: NO GROWTH
BILIRUB SERPL-MCNC: 0.5 MG/DL
BUN SERPL-MCNC: 4.8 MG/DL (ref 6–20)
CALCIUM SERPL-MCNC: 9.2 MG/DL (ref 8.6–10)
CHLORIDE SERPL-SCNC: 102 MMOL/L (ref 98–107)
CREAT SERPL-MCNC: 0.54 MG/DL (ref 0.51–0.95)
CRP SERPL-MCNC: 41.5 MG/L
DEPRECATED HCO3 PLAS-SCNC: 26 MMOL/L (ref 22–29)
E CHAFFEENSIS DNA BLD QL NAA+PROBE: NOT DETECTED
E EWINGII DNA SPEC QL NAA+PROBE: NOT DETECTED
EHRLICHIA DNA SPEC QL NAA+PROBE: NOT DETECTED
ERYTHROCYTE [DISTWIDTH] IN BLOOD BY AUTOMATED COUNT: 13.8 % (ref 10–15)
GFR SERPL CREATININE-BSD FRML MDRD: >90 ML/MIN/1.73M2
GLUCOSE BLDC GLUCOMTR-MCNC: 101 MG/DL (ref 70–99)
GLUCOSE BLDC GLUCOMTR-MCNC: 103 MG/DL (ref 70–99)
GLUCOSE BLDC GLUCOMTR-MCNC: 103 MG/DL (ref 70–99)
GLUCOSE BLDC GLUCOMTR-MCNC: 105 MG/DL (ref 70–99)
GLUCOSE BLDC GLUCOMTR-MCNC: 109 MG/DL (ref 70–99)
GLUCOSE BLDC GLUCOMTR-MCNC: 131 MG/DL (ref 70–99)
GLUCOSE SERPL-MCNC: 103 MG/DL (ref 70–99)
HCT VFR BLD AUTO: 36.6 % (ref 35–47)
HGB BLD-MCNC: 11.6 G/DL (ref 11.7–15.7)
MAGNESIUM SERPL-MCNC: 1.8 MG/DL (ref 1.7–2.3)
MCH RBC QN AUTO: 30.1 PG (ref 26.5–33)
MCHC RBC AUTO-ENTMCNC: 31.7 G/DL (ref 31.5–36.5)
MCV RBC AUTO: 95 FL (ref 78–100)
PLATELET # BLD AUTO: 228 10E3/UL (ref 150–450)
POTASSIUM SERPL-SCNC: 3.8 MMOL/L (ref 3.4–5.3)
PROCALCITONIN SERPL IA-MCNC: 1.55 NG/ML
PROT SERPL-MCNC: 6.5 G/DL (ref 6.4–8.3)
RBC # BLD AUTO: 3.85 10E6/UL (ref 3.8–5.2)
SODIUM SERPL-SCNC: 139 MMOL/L (ref 136–145)
WBC # BLD AUTO: 4.8 10E3/UL (ref 4–11)

## 2023-06-07 PROCEDURE — 250N000011 HC RX IP 250 OP 636: Performed by: PHYSICIAN ASSISTANT

## 2023-06-07 PROCEDURE — 250N000011 HC RX IP 250 OP 636: Performed by: STUDENT IN AN ORGANIZED HEALTH CARE EDUCATION/TRAINING PROGRAM

## 2023-06-07 PROCEDURE — C9113 INJ PANTOPRAZOLE SODIUM, VIA: HCPCS | Performed by: PHYSICIAN ASSISTANT

## 2023-06-07 PROCEDURE — 80053 COMPREHEN METABOLIC PANEL: CPT | Performed by: PHYSICIAN ASSISTANT

## 2023-06-07 PROCEDURE — 258N000003 HC RX IP 258 OP 636: Performed by: STUDENT IN AN ORGANIZED HEALTH CARE EDUCATION/TRAINING PROGRAM

## 2023-06-07 PROCEDURE — 250N000013 HC RX MED GY IP 250 OP 250 PS 637: Performed by: STUDENT IN AN ORGANIZED HEALTH CARE EDUCATION/TRAINING PROGRAM

## 2023-06-07 PROCEDURE — 250N000013 HC RX MED GY IP 250 OP 250 PS 637: Performed by: PHYSICIAN ASSISTANT

## 2023-06-07 PROCEDURE — 86140 C-REACTIVE PROTEIN: CPT | Performed by: PHYSICIAN ASSISTANT

## 2023-06-07 PROCEDURE — 250N000011 HC RX IP 250 OP 636: Performed by: INTERNAL MEDICINE

## 2023-06-07 PROCEDURE — 83735 ASSAY OF MAGNESIUM: CPT | Performed by: STUDENT IN AN ORGANIZED HEALTH CARE EDUCATION/TRAINING PROGRAM

## 2023-06-07 PROCEDURE — 84145 PROCALCITONIN (PCT): CPT | Performed by: PHYSICIAN ASSISTANT

## 2023-06-07 PROCEDURE — 99232 SBSQ HOSP IP/OBS MODERATE 35: CPT | Performed by: INTERNAL MEDICINE

## 2023-06-07 PROCEDURE — 85027 COMPLETE CBC AUTOMATED: CPT | Performed by: PHYSICIAN ASSISTANT

## 2023-06-07 PROCEDURE — 36591 DRAW BLOOD OFF VENOUS DEVICE: CPT | Performed by: PHYSICIAN ASSISTANT

## 2023-06-07 PROCEDURE — 120N000002 HC R&B MED SURG/OB UMMC

## 2023-06-07 RX ORDER — DIPHENHYDRAMINE HCL 25 MG
25 CAPSULE ORAL EVERY 6 HOURS PRN
COMMUNITY
Start: 2023-06-07 | End: 2023-11-04

## 2023-06-07 RX ORDER — PRUCALOPRIDE 2 MG/1
2 TABLET, FILM COATED ORAL DAILY
COMMUNITY
Start: 2023-06-07 | End: 2024-01-22

## 2023-06-07 RX ORDER — ZOLMITRIPTAN 5 MG/1
5 TABLET, ORALLY DISINTEGRATING ORAL
COMMUNITY
Start: 2023-06-07 | End: 2023-10-23

## 2023-06-07 RX ORDER — AMITRIPTYLINE HYDROCHLORIDE 10 MG/1
40 TABLET ORAL AT BEDTIME
Qty: 30 TABLET | Refills: 0 | Status: SHIPPED | OUTPATIENT
Start: 2023-06-07 | End: 2023-06-13

## 2023-06-07 RX ORDER — METHOCARBAMOL 750 MG/1
750 TABLET, FILM COATED ORAL 4 TIMES DAILY PRN
COMMUNITY
Start: 2023-06-07 | End: 2023-06-13

## 2023-06-07 RX ORDER — ERTAPENEM 1 G/1
1 INJECTION, POWDER, LYOPHILIZED, FOR SOLUTION INTRAMUSCULAR; INTRAVENOUS EVERY 24 HOURS
Qty: 110 ML | Refills: 0 | Status: ACTIVE | OUTPATIENT
Start: 2023-06-07 | End: 2023-06-18

## 2023-06-07 RX ORDER — GUAIFENESIN 600 MG/1
15 TABLET, EXTENDED RELEASE ORAL DAILY
Status: DISCONTINUED | OUTPATIENT
Start: 2023-06-07 | End: 2023-06-10 | Stop reason: HOSPADM

## 2023-06-07 RX ORDER — DEXTROSE MONOHYDRATE 100 MG/ML
INJECTION, SOLUTION INTRAVENOUS CONTINUOUS PRN
Status: DISCONTINUED | OUTPATIENT
Start: 2023-06-07 | End: 2023-06-10 | Stop reason: HOSPADM

## 2023-06-07 RX ORDER — CYCLOBENZAPRINE HCL 10 MG
10 TABLET ORAL DAILY
COMMUNITY
Start: 2023-06-07 | End: 2023-10-27 | Stop reason: ALTCHOICE

## 2023-06-07 RX ORDER — IBUPROFEN 400 MG/1
400 TABLET, FILM COATED ORAL EVERY 6 HOURS PRN
COMMUNITY
Start: 2023-06-07 | End: 2023-10-27

## 2023-06-07 RX ADMIN — SENNOSIDES 5 ML: 8.8 LIQUID ORAL at 20:16

## 2023-06-07 RX ADMIN — MONTELUKAST 10 MG: 10 TABLET, FILM COATED ORAL at 21:26

## 2023-06-07 RX ADMIN — THIAMINE HCL TAB 100 MG 100 MG: 100 TAB at 08:11

## 2023-06-07 RX ADMIN — METHOCARBAMOL 750 MG: 750 TABLET ORAL at 00:03

## 2023-06-07 RX ADMIN — FAMOTIDINE 20 MG: 40 POWDER, FOR SUSPENSION ORAL at 21:24

## 2023-06-07 RX ADMIN — METHOCARBAMOL 750 MG: 750 TABLET ORAL at 17:04

## 2023-06-07 RX ADMIN — LEVOTHYROXINE SODIUM 137 MCG: 0.14 TABLET ORAL at 08:11

## 2023-06-07 RX ADMIN — Medication: at 10:43

## 2023-06-07 RX ADMIN — HYDROMORPHONE HYDROCHLORIDE 0.3 MG: 1 INJECTION, SOLUTION INTRAMUSCULAR; INTRAVENOUS; SUBCUTANEOUS at 04:28

## 2023-06-07 RX ADMIN — PANCRELIPASE 2 CAPSULE: 24000; 76000; 120000 CAPSULE, DELAYED RELEASE PELLETS ORAL at 10:39

## 2023-06-07 RX ADMIN — PANTOPRAZOLE SODIUM 40 MG: 40 INJECTION, POWDER, FOR SOLUTION INTRAVENOUS at 08:11

## 2023-06-07 RX ADMIN — CETIRIZINE HYDROCHLORIDE 10 MG: 10 TABLET, FILM COATED ORAL at 08:11

## 2023-06-07 RX ADMIN — DIPHENHYDRAMINE HYDROCHLORIDE 25 MG: 25 SOLUTION ORAL at 17:03

## 2023-06-07 RX ADMIN — OXYCODONE HYDROCHLORIDE 5 MG: 5 SOLUTION ORAL at 21:26

## 2023-06-07 RX ADMIN — PROCHLORPERAZINE EDISYLATE 10 MG: 5 INJECTION INTRAMUSCULAR; INTRAVENOUS at 17:04

## 2023-06-07 RX ADMIN — PANCRELIPASE 2 CAPSULE: 24000; 76000; 120000 CAPSULE, DELAYED RELEASE PELLETS ORAL at 08:12

## 2023-06-07 RX ADMIN — METHOCARBAMOL 750 MG: 750 TABLET ORAL at 21:25

## 2023-06-07 RX ADMIN — ACETAMINOPHEN 650 MG: 325 TABLET, FILM COATED ORAL at 17:04

## 2023-06-07 RX ADMIN — ACETAMINOPHEN 650 MG: 325 TABLET, FILM COATED ORAL at 08:11

## 2023-06-07 RX ADMIN — METHOCARBAMOL 750 MG: 750 TABLET ORAL at 08:11

## 2023-06-07 RX ADMIN — SENNOSIDES 5 ML: 8.8 LIQUID ORAL at 08:11

## 2023-06-07 RX ADMIN — PROMETHAZINE HYDROCHLORIDE 25 MG: 25 INJECTION INTRAMUSCULAR; INTRAVENOUS at 23:05

## 2023-06-07 RX ADMIN — ERTAPENEM SODIUM 1 G: 1 INJECTION, POWDER, LYOPHILIZED, FOR SOLUTION INTRAMUSCULAR; INTRAVENOUS at 17:38

## 2023-06-07 RX ADMIN — PROMETHAZINE HYDROCHLORIDE 25 MG: 6.25 SOLUTION ORAL at 00:14

## 2023-06-07 RX ADMIN — AMITRIPTYLINE HYDROCHLORIDE 40 MG: 10 TABLET, FILM COATED ORAL at 21:26

## 2023-06-07 ASSESSMENT — ACTIVITIES OF DAILY LIVING (ADL)
ADLS_ACUITY_SCORE: 24

## 2023-06-07 NOTE — PLAN OF CARE
Goal Outcome Evaluation: Ongoing; Progressing.       Plan of Care Reviewed With: patient    Overall Patient Progress: no change    Outcome Evaluation: Pain well managed, TF initiated, continues on IV abx.  Crushed meds through G, liquid meds through J.    RN assumed cares at 1500, Pt alert and oriented, VS stable throughout the shift.  Pt reports some minor pain relieved with tylenol.  Pt continues on IV abx per plan of care through Port.  TF initiated this shift, running at 10 mL/hr; can be increased to 20 at 0100.  Pt has separate G and J tubes;  crushed meds through G and liquid meds through J as well as tube feeding.  Plan to discharge to home once TF at goal and pain well managed.  No acute incidents this shift.  Continue to monitor and notify MD of any changes.

## 2023-06-07 NOTE — PROVIDER NOTIFICATION
Provider notified (name):Martha Good APRN CNP  Reason for notification: Pt had a reaction to IV tape leaving a reddened area which is itchy. Pt is requesting  topical Benadryl   Recommendation/request given to provider:  Response from provider:

## 2023-06-07 NOTE — DISCHARGE INSTRUCTIONS
1. Enteral Nutrition support recommendation:   - Access: Jejunostomy tube   - Dosing wt: 60.4 kg standing wt on 6/6/23     - TF: Vital 1.5 ( could substitute with peptamen 1.5 at home due to higher amount of MCT: LCT ratio), goal @ 40 ml/hr at continuous rate initially.      Vital 1.5 @ goal 40 ml/hr (960 ml/day) to provide: 1440 kcals (24 kcal/kg), 64 gm PRO (1.1 gm/kg), 733 ml free H20, 179 gm CHO, and 5 gm soluble fiber daily.     Modules:  Prosource TF 20 protein supplement: 1 packet twice per day  - TF + Prosource: 1600 kcal/day ( 27 kcal/kg) + 104 gm protein/day ( 1.7 gm/kg).   - Meets estimated needs if able to tolerate goal TF rate      Free water flushes: 120 ml Q4 hrs for full hydration ( will start feed and flush in the hospital: 30 ml/hr at continuous rate).  - TF + free water flushes -> 733 + 720 = 1453 ml ( 1 ml/1kcal). Meets hydration needs with TF.      - Multivitamin/mineral: Certavite 15 ml/day via FT.    - PERT: On Relizorb pancreatic enzyme with TF support     TPN: on hold due to bacteremia.

## 2023-06-07 NOTE — PHARMACY
[facility]  Parenteral ANtibiotic Review at Departure from Acute Care Collaborative Note     Patient: Dinora Mcghee  MRN: [id number]  Allergies: Apple juice, Corticosteroids, Depakote [divalproex sodium], Zithromax [azithromycin dihydrate], Bromfenac, Codeine, Darvocet [propoxyphene n-apap], Morphine, Nalbuphine hcl, Zosyn [piperacillin-tazobactam in dex], Bactrim [sulfamethoxazole w-trimethoprim], Statins, Tape [adhesive tape], Tramadol, Zofran [ondansetron], and Flagyl [metronidazole]    Current Location: [facility]  OPAT to be provided by: [facility]       Line Type: Port    Diagnosis/Indications: Klebsiella pneumoniae bacteremia w/ port in place & multiple antibiotic allergies  Organism(s): Klebsiella pneumoniae  MRDO? No  Pending Cultures/Microbiological Tests: no      Inpatient ID involved in developing OPAT plan: Yes - discharge OPAT plan has no changes from ID provider, Dr. Vicenta Stubbs, OPAT plan charted on 6/6/2023    Outpatient ID Follow-up: ID OPAT Clinic Referral Placed ([facility] Ph: [phone] and Fax: [phone]) - appointment not scheduled, but needed  Designated Provider: Dr. Jayde Ramos    Antimicrobial Regimen / Route Anticipated  Duration Start Date Stop /  Reassess Date   Ertapenem 1 g every 24 hours/IV 14 days 6/5/2023 6/18/2023     Laboratory Tests and Monitoring Frequency: CBC with Diff, CMP Once Weekly    Imaging/Miscellaneous Monitoring: Repeat blood culture x2 sets (1 port, 1 peripheral) 1 week after finishing antibiotic therapy    ID Pharmacist Interventions: None                          Rossi Pereira, PharmD, BCIDP  Pager: [phone]

## 2023-06-07 NOTE — PLAN OF CARE
"Blood pressure (P) 130/75, pulse (P) 85, temperature (P) 97.9  F (36.6  C), temperature source (P) Oral, resp. rate (P) 16, height 1.727 m (5' 8\"), weight 60.4 kg (133 lb 3.2 oz), SpO2 (P) 96 %, not currently breastfeeding.    Alert and oriented by 4. Able to make needs known. Pts pain mild, controlled well with tylenol and robaxin. Pt has a G and J tube-- G is where crushed medications are push thru and liquid solutions thru the J.     Ind with all transfers. Pt was supposed to discharge today but discharge was cancelled with pts primary team realized pt had not received any tube feeding yet. Team concerned for refeeding syndrome.     Pt verbalized frustration about system failure to catch problem of tube feeding sooner.     No major medical changes, continue with plan of care  "

## 2023-06-07 NOTE — PROGRESS NOTES
[facility]    Medicine Progress Note - Hospitalist Service, GOLD TEAM 8    Date of Admission:  6/4/2023    Assessment & Plan    Dinora Mcghee is a 58 year old woman with history including gallstone s/p cholecystectomy (1996), multiple ERCP c/b recurrent pancreatitis, gastroparesis c/b chronic nausea and vomiting, chronic malnutrition on TF and TPN presented to [facility] ED on 6/4/23 with two days of fever and body aches.     Sepsis  Klebsiella pneumonia bacteremia (2 of 2 Bcx 6/4/23)  Elevated Lactate  Body Aches  Asplenia  Onset of fevers (highest 101.7F day of adm), widespread body aches (head to toes, no meningeal signs), rising LFTs, poor appetite, acute on chronic abdominal pain and worsening of baseline diarrhea. Lactate 2.7-->1.3 with fluids. WBC 8.8 with no left shift. Procal 6.33. UA no e/o infection. CXR no infiltrate. CTA C/A/P at [facility] 6/3/23 with no acute findings. CLABSI vs cholangitis vs other.   - Concern for possible line infection: blood cultures pending, continue Ertapenem and Vancomycin started in ED for now. IVF hydration overnight.    - Initially concern for possible tick-born illness (no known tick bites but has found several on her dogs): Labs sent for lyme (Ab neg), ehrlichia, babesia.   - Beta D glucan pending  - Urine culture pending   - Transplant ID consulted  - s/p meropenem; Continue ertapenem 1g q24 hrs (6/6-6/18), plan for 14 day course via port  - Surveillance cultures 7d after antibiotics completed  - Stop vancomycin     Transaminitis - improving  Noted rising LFTs as of ~3 weeks ago possibly related to TPN. Lipase and total bili WNL. . LFTs are higher than documented at [facility] in [place] day prior to admission.   - Hep A (IgM neg), B (pdg), C (IgG neg)   - INR normal, suggesting intact synthetic function   - MRCP to r/o cholangitis, negative; no ERCP per GI   - ID abx per above x 14 days via port   - GI consulted     Acute  on Chronic Abdominal Pain  Related to above complicated GI history. Seeing [facility] Pain specialists and being evaluated for spinal cord stimulator. Patient reporting typically has abdominal pain related to gastroparesis, but has been significantly worse particularly on the left side in addition to wide spread body aches from her head to her toes. COVID/Flu/RSV negative on 6/3/23.   - Oxycodone and dilaudid available for now, please wean as tolerated   - Continue PTA Robaxin QID prn and Flexeril at bedtime prn, continue PTA Elavil at bedtime.   - acetaminophen maximum 2g daily with transaminase elevation     J Tube in place  G Tube in place  Gastroparesis c/b Chronic Nausea  Chronic Constipation   - Regular diet (eating limited amounts of food at home)   - TPN on hold until cultures seen to clear   - TF to resume 6/5 (appreciate nutrition assistance)   - Phenergan and Compazine available for nausea   - Continue PTA Motegrity and scheduled senna (hold for loose stools)   - Scopolamine patch   - Line/drain cares   - Monitor I&O   - Please note that she puts crushed pills through her G tube, all liquid meds and TF through J tube.     Severe Malnutrition  Hypokalemia  Hypomagnesemia  Hypophosphatemia  On TPN (unsure of rate currently, runs for 14 hours each day concurrently with TF) and TF (vivonex rate 30cc/hr x 14 hours daily, starting ~2000 each night). Regular diet as well, though has not eaten x 3 days secondary to feeling very poorly. Mag 1.5, phos 1.8, K 3.5 at admission.   - Aggressively replace electrolytes and continue thiamine   - Nutrition consult for TF to resume (after MRCP result)   - TPN on hold until line infection ruled out   - Continue regular diet as tolerated   - LR ordered for now at low rate     Acute on Chronic Diarrhea  Frequently with loose stools but reports significantly worse past 3 days (no melena or hematochezia). Is having acute on chronic abdominal pain.   - Check for C. Diff and  stool culture panel - will discontinue with no stools 6/6 and alternative infectious source     Chest Pressure  Noted alongside body aches, resolved with dilaudid. EKG without evidence of ST segment elevation or depression. Trop 7, <6.  - Stop telemetry     History of pancreatectomy related DM  S/p islet cell transplant 12/28/2016  Typically there is insulin in her TPN. Last A1C 5.5 ~6 months ago.   - BG Q4H with hypoglycemia protocol   - If concern for hyperglycemia, can consider sliding scale insulin   - If hypoglycemia, can start D10 (typical TF rate 30cc/hr so would need this at the very minimum).   - Continue creon with meals     Incidental Lung Nodule  3 mm right upper lobe nodule on series 3 image 103 noted on CTA C/A/P 6/3/23 done at [place].   - Will need follow up imaging     GERD  Candida esophagitis  Finished fluconazole 400 mg suspension daily per G-tube for 21 day course.   - Continue PTA IV protonix in am and pepcid in pm     Hypothyroidism   - Continue PTA levothyroxine     H/o Migraines  Uses Botox injection and Zomig at home.   - Please resume PTA Zomig if repeat troponin negative (if no concern for ACS)      Seasonal Allergies   - Continue Zyrtec and Singulair       Diet: Soft & Bite Sized Diet (level 6) Thin Liquids (level 0)    DVT Prophylaxis: Pneumatic Compression Devices  Piedra Catheter: Not present  Lines: PRESENT      Port A Cath Single 12/09/22 Right Chest wall-Site Assessment: WDL      Cardiac Monitoring: None  Code Status: No CPR- Pre-arrest intubation OK      Clinically Significant Risk Factors            # Hypomagnesemia: Lowest Mg = 1.6 mg/dL in last 2 days, will replace as needed   # Hypoalbuminemia: Lowest albumin = 3.2 g/dL at 6/5/2023  7:03 AM, will monitor as appropriate                     Disposition Plan     Expected Discharge Date: 06/07/2023      Destination: home with family  Discharge Comments: Dispo: Home, resume [facility] for TF, TPN  Delays: Establish nutrition, headache           Marcela Meade MD  Hospitalist Service, GOLD TEAM 8  [facility]  Securely message with [employer] (more info)  Text page via [employer] Paging/Directory   See signed in provider for up to date coverage information  ______________________________________________________________________    Interval History   Doing ok  To resume TF today  Possible discharge tomorrow       Physical Exam   Vital Signs: Temp: 97.9  F (36.6  C) Temp src: Oral BP: 122/75 Pulse: 82   Resp: 17 SpO2: 96 % O2 Device: None (Room air)    Weight: 133 lbs 3.2 oz    General: No acute distress, breathing comfortably on room air  Neuro: EOMI, PERRLA. Facial muscles symmetric, strength/sensation grossly intact.  HEENT: Anicteric sclera. Oropharynx is clear. No lymphadenopathy.  Chest/Lungs: No accessory respiratory muscle use. Adequate air movement throughout. CTAB.  CV: Normal rate, regular rhythm. nl S1/S2. No m/r/g. Cap refill &lt;2s.  Abd: BS+. Soft, NTND.  Ext: Warm, well-perfused. Strong distal pulses.      Medical Decision Making       40 MINUTES SPENT BY ME on the date of service doing chart review, history, exam, documentation & further activities per the note.      Data     I have personally reviewed the following data over the past 24 hrs:    4.8  \   11.6 (L)   / 228     139 102 4.8 (L) /  109 (H)   3.8 26 0.54 \       ALT: 177 (H) AST: 44 (H) AP: 197 (H) TBILI: 0.5   ALB: 3.6 TOT PROTEIN: 6.5 LIPASE: N/A       Procal: 1.55 (H) CRP: 41.50 (H) Lactic Acid: N/A         Imaging results reviewed over the past 24 hrs:   No results found for this or any previous visit (from the past 24 hour(s)).  Recent Labs   Lab 06/07/23  1110 06/07/23  0739 06/07/23  0707 06/06/23  0958 06/06/23  0549 06/05/23  0834 06/05/23  0703 06/05/23  0702 06/04/23  2231 06/04/23  1348   WBC  --   --  4.8  --  8.8  --   --  14.4*  --  8.8   HGB  --   --  11.6*  --  11.1*  --   --  10.9*  --  11.8   MCV  --   --  95  --  95   --   --  97  --  96   PLT  --   --  228  --  184  --   --  182  --  164   INR  --   --   --   --   --   --   --   --   --  1.15   NA  --   --  139  --  139  --  139  --   --  140   POTASSIUM  --   --  3.8  --  3.7  --  3.8  --   --  3.5   CHLORIDE  --   --  102  --  103  --  106  --   --  105   CO2  --   --  26  --  26  --  24  --   --  21*   BUN  --   --  4.8*  --  3.5*  --  5.9*  --   --  11.0   CR  --   --  0.54  --  0.46*  --  0.41*  --   --  0.50*   ANIONGAP  --   --  11  --  10  --  9  --   --  14   KASSI  --   --  9.2  --  8.7  --  8.3*  --   --  8.9   * 103* 103*   < > 132*   < > 186*  --    < > 192*   ALBUMIN  --   --  3.6  --  3.3*  --  3.2*  --   --  3.6   PROTTOTAL  --   --  6.5  --  6.0*  --  5.7*  --   --  6.2*   BILITOTAL  --   --  0.5  --  0.5  --  0.4  --   --  0.7   ALKPHOS  --   --  197*  --  210*  --  222*  --   --  281*   ALT  --   --  177*  --  221*  --  306*  --   --  434*   AST  --   --  44*  --  67*  --  143*  --   --  305*   LIPASE  --   --   --   --   --   --   --   --   --  6*    < > = values in this interval not displayed.

## 2023-06-07 NOTE — PLAN OF CARE
RN assumed cares at [id number]     Vitals: VSS on room air.  Pain: pt reported headache 5/10 - PRN tylenol and robaxin given with relief. Pt received IV dilaudid 1x to help with pain.  Neuro: A&Ox4; calm and cooperative.   Cardiac: WDL.   Peripheral neurovascular: no numbness and tingling reported.  Respiratory: Lung sounds diminished and equal bilaterally. Stable on room air. Pt denies SOB/ALBARADO. No respiratory distress noted overnight.  GI/: Continent of bowel & bladder. Voiding spontaneously. No reported BMs overnight.  IV/Drains: R chest port saline locked. G tube on R abdomen; J tube on L abdomen.   Skin: Skin check completed without any new concerns. Redness under J tube site - previously noted.  Activity: up independently.   Nutrition: Regular diet.  Labs: on RN managed K+, Mg, and Phos replacement protocol      Events/plan: Continue with pain control. Pt wondering when she can restart TF and what the plan of care is.     No acute events overnight. Pt slept between cares. Q2hr rounding completed. Call light within reach and is able to make needs known.     Goal Outcome Evaluation:      Plan of Care Reviewed With: patient    Overall Patient Progress: no changeOverall Patient Progress: no change    Outcome Evaluation: Pt continues to report pain. TF not resumed yet.

## 2023-06-07 NOTE — PROGRESS NOTES
Gastroenterology Panc Bili service - Progress note      Assessment and plan      Dinora Mcghee is a 58 year old female with hx of chronic pancreatitis (ERCP associated) s/p TPIAT 2016 c/b DM, gastroparesis c/b nausea,vomiting and feeding tube dependence with need for multiple FT exchanges, chronic malnutrition on TF and TPN, S/p CCY presented to ED with fever, abdominal pain and whole body ache found to have mixed transaminitis and K pneumoniae bacteremia.6/5 MRCP unremarkable for biliary obstruction. LFT trending down in the setting of abx treatment. TF initiated today 6/7.      # Mixed transaminitis c/f cholestasis of sepsis   # Chronic pancreatitis s/p TPIAT   # Klebsiella pneumoniae bacteremia on Meropenam  Presented with fever, worsening abdominal pain. CT without significant pathology (but CTA did not comment on the bile duct). New elevated liver enzymes could be related to the new initiation of TPN, or could be a presentation of cholangitis given accompanied by fever and also with this new gram negative bacilli which is a typical pathogen for cholangitis in the setting of TPIAT. Ultrasound did not show dilated common bile duct, but with limited visualization. Will further assess possible cholangitis/bile duct dilation with MRCP.     Reviewed MRCP with staff, no evidence of anastomotic or upstream biliary/ intra hepatic obstruction. No indication for ERCP at this time. In the setting of improvement of LFTs with abx suspect etiology is cholestasis of sepsis.         # Gastroparesis c/b TF and TPN dependence   # Delayed small bowel transit   # Visceral hypersensitivity   # Malnutrition   - Continue amitriptyline for chronic abdominal pain with component of visceral hypersensitivity  - Antibiotics and line management per primary team  - Agree with holding TPN through the current port line   - Initiate TF today and monitor for tolerance.     We will continue to follow LFTs.      Above plan discussed with   "Xavier, GI staff physician.    Cordelia Solis MD PGY1      Subjective:  Patient reports marked improvement in headache, Also notes improvement in overall body pain, nausea and vomiting. Reports having mild appetite and tried a small amount of breakfast. Reports feeling reflux symptoms as a result of it.      Objective:  /71 (BP Location: Left arm)   Pulse 76   Temp 98.2  F (36.8  C) (Oral)   Resp 16   Ht 1.727 m (5' 8\")   Wt 62.2 kg (137 lb 3.2 oz)   SpO2 94%   BMI 20.86 kg/m     General: overall well appearing. Answers questions appropriately. NAD   GI: Midline surgical scar present. G tube draining yellow gastric contents . PEG- J tube present and capped. Non tender to palpation non distended abdomen. Faint bowel sounds present   Chest: R Upper port present.   Card: RRR   Pulm: Vesicular breath sounds   Extremities: Non edematous not jaundiced.      6/5/2023 MRCP   1. The common bile duct appears somewhat narrowed near the  bifurcation, however there is no upstream intrahepatic biliary  dilatation to suggest obstruction. No definite findings to suggest  acute cholangitis.  2. Heterogeneous enhancement of the liver on arterial phase sequence  with associated periportal edema. Findings are nonspecific but can be  seen with acute hepatitis. Alternatively, periportal edema may be due  to overall third spacing of fluid with ascites and some body wall  edema also noted. Correlation with liver function tests.  3. Small volume ascites.     "

## 2023-06-07 NOTE — CONSULTS
Care Management Follow Up    Length of Stay (days): 3  Expected Discharge Date: 06/08/2023  Concerns to be Addressed: all concerns addressed in this encounter     Patient plan of care discussed at interdisciplinary rounds: Yes  Anticipated Discharge Disposition: Home, Home Care, Home Infusion    [facility] LiaMakayla choudhary  [address] [place] [address]  [phone]  -Resume TPN order needed by pharm at discharge    [facility]  [address] [place] [address] [phone]    Anticipated Discharge Services: None  Anticipated Discharge DME: Resume TF, TPN, Gtube and Jtube supplies  Patient/family educated on Medicare website which has current facility and service quality ratings: no  Education Provided on the Discharge Plan: yes   Patient/Family in Agreement with the Plan: yes  Referrals Placed by CM/SW: N/A   Private pay costs discussed: Not applicable  Additional Information: Per G8 provider, this patient is not med ready for discharge today. SW was in communication with [facility] liaison Makayla Ascencio to coordinate discharge planning.  __________________________     JAIDA Perez, CASSSW  /Care Coordinator  [facility]     Weekend 5A & 5B  Pager: [phone]   Weekend 5A & 5B RNCC Pager: [phone]  Weekdays after hours (1630 - 0000), Saturday & Sunday after hours ([phone]), & [facility] Recognized Holidays Coverage  Pager: [phone]

## 2023-06-07 NOTE — PHARMACY-CONSULT NOTE
Pharmacy Tube Feeding Consult    Medication reviewed for administration by feeding tube and for potential food/drug interactions.    Recommendation: No changes are needed at this time. Per RN, patient is able to swallow pills. There is not data to support crushing Motegrity at this time.      Pharmacy will continue to follow as new medications are ordered.

## 2023-06-07 NOTE — PHARMACY-CONSULT NOTE
Pharmacy Tube Feeding Consult    Medication reviewed for administration by feeding tube and for potential food/drug interactions.    Recommendation:    There is not data to support crushing Motegrity at this time.    Levothyroxine: Decreased levels as levothyroxine may bind to enteral feeding tubes and/or may be lost during crushing and transfer.    For use < 7 days: Suggest no dose adjustment required.     For use > 7 days: Suggest holding tube feedings 1 hour pre and post dose. Monitor thyroid function weekly    Pharmacy will continue to follow as new medications are ordered.    Bindu Huddleston, PharmD

## 2023-06-07 NOTE — PLAN OF CARE
Goal Outcome Evaluation:  Assumed pt cares from 1161-7805. Pt AO x4 and is able to make needs known. Vs were stable on room air, pt reported head pain which was rated as 5/10. Provided  Robaxin x1, and Tylenol. Pt resumed a regular diet with was tolerated, nausea denied. Pt had skin raction to IV tape which caused reddened itchy skin, provided PRN Benadryl cream x1. Pt voided spontaniusly with no BM occurances. Continue to monitor for pain and provide POC. Pts call light is within reach.       Plan of Care Reviewed With: patient          Outcome Evaluation: Continue with POC

## 2023-06-07 NOTE — PROGRESS NOTES
CLINICAL NUTRITION SERVICES - Brief  NOTE      Nutrition Prescription     RECOMMENDATIONS FOR MDs/PROVIDERS TO ORDER:  1. Glycemic control with TF support ( patient with DM1)  2. Lyte monitoring with TF start and advancement  3. Bowel regimen per team     4. Discussed tube feed plan with patient today. Patient would like a trial of Semi-elemental tube feeds to see if able to tolerate. Previously on an elemental tube feeds ( vivonex TEN). Will order Vital 1.5 with Relizorb pancreatic enzyme. Patient notes, she has both vital 1.5 / peptamen 1.5 and Relizorb at home. Patient  works with [employer]. Previously plan was to start vital 1.5, switched to Vivonex due to diarrhea.        Recommendations already ordered by Registered Dietitian (RD):  1. Enteral Nutrition support recommendation:   - Access: Jejunostomy tube   - Dosing wt: 60.4 kg standing wt on 6/6/23     - TF: Ordered Vital 1.5 ( could substitute with peptamen 1.5 at home), Initiate @ 10 ml/hr and advance by 10 ml Q 8hr as tolerated to goal @ 40 ml/hr.  Do not start or advance unless K+ >3.0, Mg++ > 1.5,  and Phos > 1.9.     Vital 1.5 @ goal 40 ml/hr (960 ml/day) to provide: 1440 kcals (24 kcal/kg), 64 gm PRO (1.1 gm/kg), 733 ml free H20, 179 gm CHO, and 5 gm soluble fiber daily.     Modules:  Prosource TF 20 protein supplement: 1 packet twice per day  - TF + Prosource: 1600 kcal/day ( 27 kcal/kg) + 104 gm protein/day ( 1.7 gm/kg).      Free water flushes: 120 ml Q4 hrs for full hydration ( will start feed and flush in the hospital: 30 ml/hr at continuous rate).  - TF + free water flushes -> 733 + 720 = 1453 ml ( 1 ml/1kcal). Meets hydration needs with TF.      - Multivitamin/mineral: Certavite 15 ml/day via FT.     - K+, Mg, Phos ordered until TF advances to goal rate for evaluation of refeeding          Provider request to start tube feeds ( TF was to resume on 6/5 and was not initiated ?)    Nutrition Progress Note - See RD note from 6/5 for details     Chart  reviewed:   History of total pancreatectomy with auto islet transplant, now with DM1 ( 16)  with jamel-en-Y GB.   On 14 hour cycle TPN and TF at home       FEN / GI:   Nutrition:   - On regular diet with limited po + Creon with meals. Has GERD when tries to increase intake.    - FT: has G/J tube. J-tube for tube feeds    - TPN on hold due to bacteremia. Has a port. TPN to run for 14 hours each night with TF support. Typically there is insulin in her TPN       IVF:   - IVF ( no D5)     Lytes:   - K+: 3.8, Mg++: 1.8, Phos: 2.8 on    - B  - Na+: 139  - BUN: 4.8 (acute low), Cr: 0.54 (L)  - CRP: 41.50 (H), WBC: down to normal        GI:   Usually has frequent stool. No BM since admit        Henry Jacobson RD/ROBBEI  Pager [phone]

## 2023-06-08 ENCOUNTER — ANESTHESIA EVENT (OUTPATIENT)
Dept: GASTROENTEROLOGY | Facility: CLINIC | Age: 58
End: 2023-06-08
Payer: COMMERCIAL

## 2023-06-08 LAB
ALBUMIN SERPL BCG-MCNC: 3.5 G/DL (ref 3.5–5.2)
ALP SERPL-CCNC: 174 U/L (ref 35–104)
ALT SERPL W P-5'-P-CCNC: 133 U/L (ref 10–35)
ANION GAP SERPL CALCULATED.3IONS-SCNC: 9 MMOL/L (ref 7–15)
AST SERPL W P-5'-P-CCNC: 30 U/L (ref 10–35)
BILIRUB SERPL-MCNC: 0.4 MG/DL
BUN SERPL-MCNC: 10.1 MG/DL (ref 6–20)
CALCIUM SERPL-MCNC: 8.6 MG/DL (ref 8.6–10)
CHLORIDE SERPL-SCNC: 103 MMOL/L (ref 98–107)
CREAT SERPL-MCNC: 0.53 MG/DL (ref 0.51–0.95)
DEPRECATED HCO3 PLAS-SCNC: 27 MMOL/L (ref 22–29)
ERYTHROCYTE [DISTWIDTH] IN BLOOD BY AUTOMATED COUNT: 13.8 % (ref 10–15)
GFR SERPL CREATININE-BSD FRML MDRD: >90 ML/MIN/1.73M2
GLUCOSE BLDC GLUCOMTR-MCNC: 105 MG/DL (ref 70–99)
GLUCOSE BLDC GLUCOMTR-MCNC: 137 MG/DL (ref 70–99)
GLUCOSE BLDC GLUCOMTR-MCNC: 154 MG/DL (ref 70–99)
GLUCOSE BLDC GLUCOMTR-MCNC: 157 MG/DL (ref 70–99)
GLUCOSE BLDC GLUCOMTR-MCNC: 92 MG/DL (ref 70–99)
GLUCOSE SERPL-MCNC: 124 MG/DL (ref 70–99)
HCT VFR BLD AUTO: 36.7 % (ref 35–47)
HGB BLD-MCNC: 11.7 G/DL (ref 11.7–15.7)
MAGNESIUM SERPL-MCNC: 1.8 MG/DL (ref 1.7–2.3)
MCH RBC QN AUTO: 30.2 PG (ref 26.5–33)
MCHC RBC AUTO-ENTMCNC: 31.9 G/DL (ref 31.5–36.5)
MCV RBC AUTO: 95 FL (ref 78–100)
PHOSPHATE SERPL-MCNC: 3.5 MG/DL (ref 2.5–4.5)
PLATELET # BLD AUTO: 277 10E3/UL (ref 150–450)
POTASSIUM SERPL-SCNC: 3.8 MMOL/L (ref 3.4–5.3)
PROT SERPL-MCNC: 6.2 G/DL (ref 6.4–8.3)
RBC # BLD AUTO: 3.87 10E6/UL (ref 3.8–5.2)
SODIUM SERPL-SCNC: 139 MMOL/L (ref 136–145)
WBC # BLD AUTO: 5.5 10E3/UL (ref 4–11)

## 2023-06-08 PROCEDURE — 250N000009 HC RX 250: Performed by: PHYSICIAN ASSISTANT

## 2023-06-08 PROCEDURE — 36591 DRAW BLOOD OFF VENOUS DEVICE: CPT | Performed by: PHYSICIAN ASSISTANT

## 2023-06-08 PROCEDURE — 250N000011 HC RX IP 250 OP 636: Performed by: STUDENT IN AN ORGANIZED HEALTH CARE EDUCATION/TRAINING PROGRAM

## 2023-06-08 PROCEDURE — 250N000013 HC RX MED GY IP 250 OP 250 PS 637: Performed by: PHYSICIAN ASSISTANT

## 2023-06-08 PROCEDURE — 85027 COMPLETE CBC AUTOMATED: CPT | Performed by: PHYSICIAN ASSISTANT

## 2023-06-08 PROCEDURE — 250N000013 HC RX MED GY IP 250 OP 250 PS 637: Performed by: HOSPITALIST

## 2023-06-08 PROCEDURE — 80053 COMPREHEN METABOLIC PANEL: CPT | Performed by: PHYSICIAN ASSISTANT

## 2023-06-08 PROCEDURE — 250N000011 HC RX IP 250 OP 636: Performed by: PHYSICIAN ASSISTANT

## 2023-06-08 PROCEDURE — 250N000011 HC RX IP 250 OP 636: Performed by: INTERNAL MEDICINE

## 2023-06-08 PROCEDURE — C9113 INJ PANTOPRAZOLE SODIUM, VIA: HCPCS | Performed by: PHYSICIAN ASSISTANT

## 2023-06-08 PROCEDURE — 250N000011 HC RX IP 250 OP 636: Performed by: HOSPITALIST

## 2023-06-08 PROCEDURE — 120N000002 HC R&B MED SURG/OB UMMC

## 2023-06-08 PROCEDURE — 83735 ASSAY OF MAGNESIUM: CPT | Performed by: STUDENT IN AN ORGANIZED HEALTH CARE EDUCATION/TRAINING PROGRAM

## 2023-06-08 PROCEDURE — 99232 SBSQ HOSP IP/OBS MODERATE 35: CPT | Performed by: INTERNAL MEDICINE

## 2023-06-08 PROCEDURE — 258N000003 HC RX IP 258 OP 636: Performed by: STUDENT IN AN ORGANIZED HEALTH CARE EDUCATION/TRAINING PROGRAM

## 2023-06-08 PROCEDURE — 84100 ASSAY OF PHOSPHORUS: CPT | Performed by: STUDENT IN AN ORGANIZED HEALTH CARE EDUCATION/TRAINING PROGRAM

## 2023-06-08 PROCEDURE — 258N000003 HC RX IP 258 OP 636

## 2023-06-08 PROCEDURE — 250N000013 HC RX MED GY IP 250 OP 250 PS 637: Performed by: STUDENT IN AN ORGANIZED HEALTH CARE EDUCATION/TRAINING PROGRAM

## 2023-06-08 RX ORDER — POTASSIUM CHLORIDE 20MEQ/15ML
20 LIQUID (ML) ORAL ONCE
Status: COMPLETED | OUTPATIENT
Start: 2023-06-08 | End: 2023-06-08

## 2023-06-08 RX ORDER — SODIUM CHLORIDE, SODIUM LACTATE, POTASSIUM CHLORIDE, CALCIUM CHLORIDE 600; 310; 30; 20 MG/100ML; MG/100ML; MG/100ML; MG/100ML
INJECTION, SOLUTION INTRAVENOUS CONTINUOUS
Status: DISCONTINUED | OUTPATIENT
Start: 2023-06-08 | End: 2023-06-10

## 2023-06-08 RX ORDER — MAGNESIUM SULFATE HEPTAHYDRATE 40 MG/ML
2 INJECTION, SOLUTION INTRAVENOUS ONCE
Status: COMPLETED | OUTPATIENT
Start: 2023-06-08 | End: 2023-06-08

## 2023-06-08 RX ADMIN — OXYCODONE HYDROCHLORIDE 5 MG: 5 SOLUTION ORAL at 21:38

## 2023-06-08 RX ADMIN — Medication: at 21:36

## 2023-06-08 RX ADMIN — FAMOTIDINE 20 MG: 40 POWDER, FOR SUSPENSION ORAL at 21:37

## 2023-06-08 RX ADMIN — SENNOSIDES 5 ML: 8.8 LIQUID ORAL at 08:34

## 2023-06-08 RX ADMIN — PROMETHAZINE HYDROCHLORIDE 25 MG: 25 INJECTION INTRAMUSCULAR; INTRAVENOUS at 10:49

## 2023-06-08 RX ADMIN — MONTELUKAST 10 MG: 10 TABLET, FILM COATED ORAL at 21:38

## 2023-06-08 RX ADMIN — HYDROMORPHONE HYDROCHLORIDE 0.3 MG: 1 INJECTION, SOLUTION INTRAMUSCULAR; INTRAVENOUS; SUBCUTANEOUS at 12:45

## 2023-06-08 RX ADMIN — MAGNESIUM SULFATE IN WATER 2 G: 40 INJECTION, SOLUTION INTRAVENOUS at 08:35

## 2023-06-08 RX ADMIN — ERTAPENEM SODIUM 1 G: 1 INJECTION, POWDER, LYOPHILIZED, FOR SOLUTION INTRAMUSCULAR; INTRAVENOUS at 14:05

## 2023-06-08 RX ADMIN — PANCRELIPASE 3 CAPSULE: 24000; 76000; 120000 CAPSULE, DELAYED RELEASE PELLETS ORAL at 08:34

## 2023-06-08 RX ADMIN — OXYCODONE HYDROCHLORIDE 5 MG: 5 SOLUTION ORAL at 02:11

## 2023-06-08 RX ADMIN — SODIUM CHLORIDE, POTASSIUM CHLORIDE, SODIUM LACTATE AND CALCIUM CHLORIDE: 600; 310; 30; 20 INJECTION, SOLUTION INTRAVENOUS at 18:16

## 2023-06-08 RX ADMIN — METHOCARBAMOL 750 MG: 750 TABLET ORAL at 10:28

## 2023-06-08 RX ADMIN — THIAMINE HCL TAB 100 MG 100 MG: 100 TAB at 08:35

## 2023-06-08 RX ADMIN — AMITRIPTYLINE HYDROCHLORIDE 40 MG: 10 TABLET, FILM COATED ORAL at 21:37

## 2023-06-08 RX ADMIN — Medication 15 ML: at 08:34

## 2023-06-08 RX ADMIN — METHOCARBAMOL 750 MG: 750 TABLET ORAL at 21:38

## 2023-06-08 RX ADMIN — CETIRIZINE HYDROCHLORIDE 10 MG: 10 TABLET, FILM COATED ORAL at 08:35

## 2023-06-08 RX ADMIN — PANTOPRAZOLE SODIUM 40 MG: 40 INJECTION, POWDER, FOR SOLUTION INTRAVENOUS at 08:35

## 2023-06-08 RX ADMIN — PROMETHAZINE HYDROCHLORIDE 25 MG: 25 INJECTION INTRAMUSCULAR; INTRAVENOUS at 21:36

## 2023-06-08 RX ADMIN — ACETAMINOPHEN 650 MG: 325 TABLET, FILM COATED ORAL at 10:28

## 2023-06-08 RX ADMIN — HYDROMORPHONE HYDROCHLORIDE 0.3 MG: 1 INJECTION, SOLUTION INTRAMUSCULAR; INTRAVENOUS; SUBCUTANEOUS at 18:55

## 2023-06-08 RX ADMIN — SCOPALAMINE 1 PATCH: 1 PATCH, EXTENDED RELEASE TRANSDERMAL at 08:48

## 2023-06-08 RX ADMIN — POTASSIUM CHLORIDE 20 MEQ: 1.5 SOLUTION ORAL at 12:53

## 2023-06-08 RX ADMIN — LEVOTHYROXINE SODIUM 137 MCG: 0.14 TABLET ORAL at 08:35

## 2023-06-08 RX ADMIN — ACETAMINOPHEN 650 MG: 325 TABLET, FILM COATED ORAL at 21:38

## 2023-06-08 ASSESSMENT — ACTIVITIES OF DAILY LIVING (ADL)
ADLS_ACUITY_SCORE: 24

## 2023-06-08 ASSESSMENT — LIFESTYLE VARIABLES: TOBACCO_USE: 1

## 2023-06-08 ASSESSMENT — ENCOUNTER SYMPTOMS: SEIZURES: 0

## 2023-06-08 NOTE — PLAN OF CARE
SHIFT: [id number]    EVENTS: Pt continues to c/o abdominal pain & nausea partially managed w/ PRNs- pt notes abdominal pain increased w/ BMs, c/o frequent loose stools- GI paged to assess BMs. TF increased from 20 to 30mL/hr this AM, planned to increase to goal of 40mL/hr this evening however TF stopped in anticipation for colonoscopy prep- started on LR at 100mL/hr. K & mag replaced w/ rechecks in AM    Temp: 97.8  F (36.6  C) Temp src: Oral BP: 100/58 Pulse: 92   Resp: 18 SpO2: 97 % O2 Device: None (Room air)       NEURO: AOx4  PAIN: C/o abd pain partially managed w/ PRNs  CARD: WDL  RESP: WDL  GI/: Regular diet w/ TF running at 30mL/hr majority of day, poor appetite, c/o nausea managed w/ IV phenergan. Pt reports multiple loose BMs w/ increased abdominal pain. Voiding spontaneously    MUSC: Ind  SKIN: LLQ J tube & RUQ G tube dressings changed. Scopolamine patch behind L ear replaced.   LDAs: R port infusing LR @ 100. LLQ J tube clamped, used for liquid meds & TF. RUQ G tube venting intermittently, use for crushed meds     PLAN: NPO at 0000 for colonoscopy at 0800 tomorrow, labs to be drawn at 0400 (nurse to call lab to ensure timing), tap water enemas at 0530, 0600 & 0630. Will need to re titrate up on TFs after & monitor tolerance    Goal Outcome Evaluation:  Plan of Care Reviewed With: patient  Overall Patient Progress: no change  Outcome Evaluation: TF advanced to 30 mL/hr w/ some increased in nausea & abd pain. C/o frequent loose stools. K & mag replaced. TF stopped in evening in anticipation for colonoscopy tomorrow AM, started in mIVF, NPO at midnight- plan for tap water enemas x3 in AM.

## 2023-06-08 NOTE — PLAN OF CARE
Status/Background: Pt is a 58 yr old female who was admitted on 6/4/23 with fevers, aches, and pancreatitis. Hx of gallstone s/p cholecystectomy (1996), s/p multiple ERCP c/b recurrent pancreatitis, chronic abdominal pain, gastroparesis c/b chronic nausea and vomiting, chronic intermittent pancreatitis, s/p splenectomy and appendectomy, chronic malnutrition/FTT on G-tube s/p multiple G/J-tube adjustments/laparotomy s/p TPIT (2016), Hx of GERD, hypothyroidism, uterine tumor s/p hysterectomy now on regular diet, TPN and TF at home   Neuros:  Alert and oriented x 4, calls appropriately.   Vitals: Vitally stable on room air  Pain: Denies.  Resp: LS clear.  GI: No BM this shift  : Voiding spontaneously.  Cardiovascular: WDL.   Lines/[place]: Right chest Port-a-cath TKO.  Skin: G-tube and J-tube site reddened.  Diet: Tolerating J-tube feeding at 20 ml/hr. Need to be increased to 30 mll/hr @ 9:30AM. Soft & bite sized diet (Level 6), thin liquids.  Activity: Up independently.  Recommendations/Plan: Will continue to monitor pt, provide for safety, encourage activity, assess response to interventions, update MDs with concerns and changes and follow POC. Relizore to be changed at 1400

## 2023-06-08 NOTE — PLAN OF CARE
RN assumed cares at [id number]     Vitals: VSS on room air.  Pain: 8/10 pain located in head, back, and abdomen. Relief from PRN oxy.  Neuro: A&Ox4; calm and cooperative.   Cardiac: WDL.   Peripheral neurovascular: WDL.  Respiratory: Lung sounds clear and diminished. No respiratory distress noted.  GI/: Continent of bladder. No BM, pt reported passing gas. C/o nausea. PRN promethazine iv given + peppermint and lavender sticks given to help with nausea.  IV/Drains: Port TKO. J tube for liquid meds and tube feeding. G tube for crushed meds.  Skin: No new concerns.  Activity: up independently.  Nutrition: Started on tube feeding at 10mL/hr. Increase to 20mL/hr at 0100. Advance 10mL Q8hr. See pt orders.  Labs: on RN managed K+, Mg, and Phos replacement protocol.     Events: Reported dry mouth. PRN artificial saliva solution ordered in bin; please ask pt whether she would like it in AM. Relizore to be change at 1400 tomorrow. No acute events during shift. Q2hr rounding completed. Call light within reach and is able to make needs known.     Plan: Discharge to home once nutrition is established.        Goal Outcome Evaluation:      Plan of Care Reviewed With: patient    Overall Patient Progress: no changeOverall Patient Progress: no change    Outcome Evaluation: TF going 10mL/hr, pain and nausea management

## 2023-06-08 NOTE — PROGRESS NOTES
GASTROENTEROLOGY PROGRESS NOTE    Date of Admission: 6/4/2023  Reason for Admission: Chronic pancreatitis      ASSESSMENT:  Dinora Mcghee is a 58 year old female with hx of chronic pancreatitis (ERCP associated) s/p TPIAT 2016 c/b DM, gastroparesis c/b nausea,vomiting and feeding tube dependence with need for multiple FT exchanges, chronic malnutrition on TF and TPN, S/p CCY presented to ED with fever, abdominal pain and whole body ache found to have mixed transaminitis and K pneumoniae bacteremia.6/5 MRCP unremarkable for biliary obstruction. LFT trending down in the setting of abx treatment. TF initiated today 6/7.      # Mixed transaminitis c/f cholestasis of sepsis   # Chronic pancreatitis s/p TPIAT   # Klebsiella pneumoniae bacteremia on Meropenam  Presented with fever, worsening abdominal pain. CT without significant pathology (but CTA did not comment on the bile duct). New elevated liver enzymes could be related to the new initiation of TPN, or could be a presentation of cholangitis given accompanied by fever and also with this new gram negative bacilli which is a typical pathogen for cholangitis in the setting of TPIAT. Ultrasound did not show dilated common bile duct, but with limited visualization. Will further assess possible cholangitis/bile duct dilation with MRCP.     Reviewed MRCP with staff, no evidence of anastomotic or upstream biliary/ intra hepatic obstruction.  LFTs continue to improve off TPN and with abx for bacteremia infection.  No indication for ERCP at this time as suspect etiology to be cholestasis of sepsis.     # Gastroparesis c/b TF and TPN dependence   # Delayed small bowel transit   # Visceral hypersensitivity   # Hx chronic malnutrition - reliant on TF and/or TPN  # Exocrine pancreatic insufficiency (mild-moderate per fecal elastase 11/2016)  # Chronic diarrhea  - Currently does not meet criteria for malnutrition given previous TPN/TF support PTA.  However, now with minimal  nutrition this LOS x4 days as holding TPN due to port line infection and not yet restarted TF until 6/8.  - Antibiotics and line management per primary team  - Remains on amitriptyline for chronic abdominal pain with component of visceral hypersensitivity  - Agree with holding TPN through the current port line   - TF: restarted 6/8 and changed from PTA formula of Vivonex (elemental, Osm 630) to Vital 1.5 (semi-elemental - peptide/MCT based, Osm 764) and currently @ 30 ml/hr (goal 40 ml/hr)   - PERT: Relizorb with TF, Creon with meals.   -Pt reports was having multiple loose stools PTA that varied from normal to loose to foamy (pt on Creon with meals and no enzymes with TF as was on elemental TF formula with free amino acids and minimal LCT fats).  No stools documented this adm until today (6/8) and now loose stools x4.  Able to observe x1 stool specimen and no overt sx of steatorrhea.  - Last Colonoscopy in 2014.  May be opportune time to attempt repeat colonoscopy with minimal prep (given minimal PO/TF intakes and as anticipate will not be able to tolerate PO nor TF prep with limited volume tolerance to enteral intakes) to evaluate for e/o colitis.     RECOMMENDATIONS:  -Plan for colonoscopy 6/9 at 0800 with MAC with Dr. Chamorro.  -Given anticipated inability to tolerate colon prep via PO nor TF (given limited tolerance to infusions >40 ml/hr via Jtube), recommend prep as follows:   -Hold TF now (ordered for you)   -CLs (no reds) now and then NPO at midnight (ordered for you).   -Begin IVFs for hydration support.   -Order tap water enemas x3 on 6/9 AM at 05:30, 06:00 and 06:30 (ordered for you)   -CMP and CBC on 6/9 at 0400  -Discontinue scheduled senna to avoid exacerbation of abd cramping/diarrhea.  -Discussed case with RD and once able to resume TFs post above procdrure, agree with change to more concentrated (1.5 kcal/mL), semi-elemental (peptide based with MCT) TF formula to attempt to meet nutrition needs in  more concentrated TF volume.     -Ideally, would trial transition to Peptamen 1.5 if available inpatient vs outpatient as has lower Osm compared to Vital 1.5 (585 vs 671, respectively) to help with diarrhea.  -Monitor stool quality once able to consistently receive TF + Relizorb.  If suspect steathorrhea, consider transition PERT to Creon/Bicarb regimen.  -Once tolerating new TF at goal and if pt with diarrhea, would consider slow addition of soluble fiber supplements to help bulk stools (Nutrisource Fiber 1 pkt daily and titrate per RD and pending stools).  -If diarrhea persists with TF/fiber intakes, consider addition of imodium 2 mg QID PRN (can titrate up to 4 mg QID PRN).    The patient was discussed and plan agreed upon with GI staff, Dr. Park and Dr. Chamorro.    GI Follow up (we will arrange):  TBD pending clinical course    Overall time spent on the date of this encounter preparing to see the patient (including chart review of available notes, clinical status events, imaging and labs); obtaining interim history/subjective data; ordering and/or coordinating medications, tests or procedures; ordering medications, tests or procedures; communicating with other health care professionals; and documenting the above clinical information in the electronic medical record was 75 minutes.     Antoinette Araujo PA-C  GI Service  [facility]  Text Page  _______________________________________________________________      Subjective: Nursing notes and 24hr events reviewed. Patient seen and examined at 12:30. Patient reports chronic issues with diarrhea and abd pain PTA despite various TF formulas and has not noted improvement after transitioned to elemental TF formula.  Noteded unable to tolerate significant rates >40 ml/hr.  Was working with Dr. Genao for chronic abd pain and using motegrity and recently up-titrated amitriptyline about 2 weeks ago which has significantly helped abd pain  "overnight to allow for improved sleep.  Has scheduled senna ordered but pt does not take if having diarrhea.  Note stool patters vary between \"normal\" (soft/brown) to loose to foamy.  Only taking Creon with meals and no enzymes with previous Vivonex (given elemental nature of formula).  Describes when has BM is a \"full body event\" as becomes nauseated and diaphoretic until eventually passes stool.  Previously has tried low FODMAP diet and gluten free diet without improvements in sx of abd pain and diarrhea.  Unclear how long ago she had a colonoscopy but asking if would help to proceed with to re-evaluate for etiology of diarrhea.  However, concerned about tolerance to prep given abd pain to significant PO/TF volume intakes.    ROS:   4 pt ROS negative unless noted in subjective.     Objective:  Blood pressure 126/73, pulse 74, temperature 98.7  F (37.1  C), temperature source Oral, resp. rate 16, height 1.727 m (5' 8\"), weight 59.3 kg (130 lb 11.2 oz), SpO2 97 %, not currently breastfeeding.  Gen: Sitting up in bed. Appears comfortable and in NAD  HEENT: NCAT. Conjunctiva clear. Sclera anicteric.  CV: RRR, Peripheral perfusion intact  Resp: non-labored work of breathing on RA  Abd: Soft, ND, mildly tender to palpation in LUQ/LLQ but no guarding or rebound, + BS.  Midline surgical scar present/well healed. PEG-J site c/d/i without drainage, TF infusing. Observed small stool sample in commode and tan/gelatinous but no overt sx of steatorrhea.  Msk: no gross deformity  Skin:  no jaundice  Ext: warm, well perfused   Neuro: grossly normal  Mental status/Psych: A&O. Asks/answers questions appropriately     Date 06/08/23 0700 - 06/09/23 0659   Shift 7547-5682 6677-0304 9122-5759 24 Hour Total   INTAKE   I.V. 10   10   NG/   100   Enteral 220 100  320   Shift Total(mL/kg) 330(5.57) 100(1.69)  430(7.25)   OUTPUT   Shift Total(mL/kg)       Weight (kg) 59.28 59.28 59.28 59.28     PROCEDURES:  None    LABS:  BMP  Recent " Labs   Lab 06/08/23  1148 06/08/23  0743 06/08/23  0706 06/08/23  0231 06/07/23  0739 06/07/23  0707 06/06/23  0958 06/06/23  0549 06/05/23  0834 06/05/23  0703   NA  --   --  139  --   --  139  --  139  --  139   POTASSIUM  --   --  3.8  --   --  3.8  --  3.7  --  3.8   CHLORIDE  --   --  103  --   --  102  --  103  --  106   KASSI  --   --  8.6  --   --  9.2  --  8.7  --  8.3*   CO2  --   --  27  --   --  26  --  26  --  24   BUN  --   --  10.1  --   --  4.8*  --  3.5*  --  5.9*   CR  --   --  0.53  --   --  0.54  --  0.46*  --  0.41*   * 137* 124* 105*   < > 103*   < > 132*   < > 186*    < > = values in this interval not displayed.     CBC  Recent Labs   Lab 06/08/23  0706 06/07/23  0707 06/06/23  0549 06/05/23  0702   WBC 5.5 4.8 8.8 14.4*   RBC 3.87 3.85 3.65* 3.57*   HGB 11.7 11.6* 11.1* 10.9*   HCT 36.7 36.6 34.7* 34.5*   MCV 95 95 95 97   MCH 30.2 30.1 30.4 30.5   MCHC 31.9 31.7 32.0 31.6   RDW 13.8 13.8 14.3 14.6    228 184 182     INR  Recent Labs   Lab 06/04/23  1348   INR 1.15     LFTs  Recent Labs   Lab 06/08/23  0706 06/07/23  0707 06/06/23  0549 06/05/23  0703   ALKPHOS 174* 197* 210* 222*   AST 30 44* 67* 143*   * 177* 221* 306*   BILITOTAL 0.4 0.5 0.5 0.4   PROTTOTAL 6.2* 6.5 6.0* 5.7*   ALBUMIN 3.5 3.6 3.3* 3.2*      PANC  Recent Labs   Lab 06/04/23  1348   LIPASE 6*         IMAGING:  (Personally reviewed)  Results for orders placed or performed during the hospital encounter of 06/04/23   XR Chest Port 1 View    Impression    IMPRESSION:   No acute airspace disease.    I have personally reviewed the examination and initial interpretation  and I agree with the findings.    BOB RIVERA MD         SYSTEM ID:  [id number]   US Abdomen Limited    Impression    IMPRESSION:   1.  Sonographic evidence of intrinsic hepatic parenchymal disease  including hepatic steatosis.  No focal liver lesion. Equivocal trace  perihepatic ascites.     2.  Echogenic right kidney compatible with medical  renal disease.  3. Cholecystectomy.  Common bile duct measures approximately 2 mm in  diameter, although difficult to visualize with ultrasound.       I have personally reviewed the examination and initial interpretation  and I agree with the findings.    CATALINA PAYNE MD         SYSTEM ID:  [id number]   MR Abdomen MRCP w/o & w Contrast    Impression    IMPRESSION:  1. The common bile duct appears somewhat narrowed near the  bifurcation, however there is no upstream intrahepatic biliary  dilatation to suggest obstruction. No definite findings to suggest  acute cholangitis.  2. Heterogeneous enhancement of the liver on arterial phase sequence  with associated periportal edema. Findings are nonspecific but can be  seen with acute hepatitis. Alternatively, periportal edema may be due  to overall third spacing of fluid with ascites and some body wall  edema also noted. Correlation with liver function tests.  3. Small volume ascites.    I have personally reviewed the examination and initial interpretation  and I agree with the findings.    MACRINA CORRIGAN MD         SYSTEM ID:  [id number]

## 2023-06-08 NOTE — PROGRESS NOTES
[facility]    Medicine Progress Note - Hospitalist Service, GOLD TEAM 8    Date of Admission:  6/4/2023    Assessment & Plan    Dinora Mcghee is a 58 year old woman with history including gallstone s/p cholecystectomy (1996), multiple ERCP c/b recurrent pancreatitis, gastroparesis c/b chronic nausea and vomiting, chronic malnutrition on TF and TPN presented to [facility] ED on 6/4/23 with two days of fever and body aches.     Sepsis  Klebsiella pneumonia bacteremia (2 of 2 Bcx 6/4/23)  Elevated Lactate  Body Aches  Asplenia  Onset of fevers (highest 101.7F day of adm), widespread body aches (head to toes, no meningeal signs), rising LFTs, poor appetite, acute on chronic abdominal pain and worsening of baseline diarrhea. Lactate 2.7-->1.3 with fluids. WBC 8.8 with no left shift. Procal 6.33. UA no e/o infection. CXR no infiltrate. CTA C/A/P at [facility] 6/3/23 with no acute findings. CLABSI vs cholangitis vs other.   - Concern for possible line infection: blood cultures pending, continue Ertapenem and Vancomycin started in ED for now. IVF hydration overnight.    - Initially concern for possible tick-born illness (no known tick bites but has found several on her dogs): Labs sent for lyme (Ab neg), ehrlichia, babesia.   - Beta D glucan pending  - Urine culture pending   - Transplant ID consulted  - s/p meropenem; Continue ertapenem 1g q24 hrs (6/6-6/18), plan for 14 day course via port  - Surveillance cultures 7d after antibiotics completed  - Stop vancomycin     Transaminitis - improving  Noted rising LFTs as of ~3 weeks ago possibly related to TPN. Lipase and total bili WNL. . LFTs are higher than documented at [facility] in [place] day prior to admission.   - Hep A (IgM neg), B (pdg), C (IgG neg)   - INR normal, suggesting intact synthetic function   - MRCP to r/o cholangitis, negative; no ERCP per GI   - ID abx per above x 14 days via port   - GI consulted     Acute  on Chronic Abdominal Pain  Related to above complicated GI history. Seeing [facility] specialists and being evaluated for spinal cord stimulator. Patient reporting typically has abdominal pain related to gastroparesis, but has been significantly worse particularly on the left side in addition to wide spread body aches from her head to her toes. COVID/Flu/RSV negative on 6/3/23.   - Oxycodone and dilaudid available for now, please wean as tolerated   - Continue PTA Robaxin QID prn and Flexeril at bedtime prn, continue PTA Elavil at bedtime.   - acetaminophen maximum 2g daily with transaminase elevation     J Tube in place  G Tube in place  Gastroparesis c/b Chronic Nausea  Chronic Constipation   - Regular diet (eating limited amounts of food at home)   - TPN on hold until cultures seen to clear   - TF to resume 6/5 (appreciate nutrition assistance)   - Phenergan and Compazine available for nausea   - Continue PTA Motegrity and scheduled senna (hold for loose stools)   - Scopolamine patch   - Line/drain cares   - Monitor I&O   - Please note that she puts crushed pills through her G tube, all liquid meds and TF through J tube.     Severe Malnutrition  Hypokalemia  Hypomagnesemia  Hypophosphatemia  On TPN (unsure of rate currently, runs for 14 hours each day concurrently with TF) and TF (vivonex rate 30cc/hr x 14 hours daily, starting ~2000 each night). Regular diet as well, though has not eaten x 3 days secondary to feeling very poorly. Mag 1.5, phos 1.8, K 3.5 at admission.   - Aggressively replace electrolytes and continue thiamine   - Nutrition consult for TF to resume (after MRCP result)   - TPN on hold until line infection ruled out   - Continue regular diet as tolerated   - LR ordered for now at low rate     Acute on Chronic Diarrhea  Frequently with loose stools but reports significantly worse past 3 days (no melena or hematochezia). Is having acute on chronic abdominal pain.   - Check for C. Diff and  stool culture panel - will discontinue with no stools 6/6 and alternative infectious source     Chest Pressure  Noted alongside body aches, resolved with dilaudid. EKG without evidence of ST segment elevation or depression. Trop 7, <6.  - Stop telemetry     History of pancreatectomy related DM  S/p islet cell transplant 12/28/2016  Typically there is insulin in her TPN. Last A1C 5.5 ~6 months ago.   - BG Q4H with hypoglycemia protocol   - If concern for hyperglycemia, can consider sliding scale insulin   - If hypoglycemia, can start D10 (typical TF rate 30cc/hr so would need this at the very minimum).   - Continue creon with meals     Incidental Lung Nodule  3 mm right upper lobe nodule on series 3 image 103 noted on CTA C/A/P 6/3/23 done at [place].   - Will need follow up imaging     GERD  Candida esophagitis  Finished fluconazole 400 mg suspension daily per G-tube for 21 day course.   - Continue PTA IV protonix in am and pepcid in pm     Hypothyroidism   - Continue PTA levothyroxine     H/o Migraines  Uses Botox injection and Zomig at home.   - Please resume PTA Zomig if repeat troponin negative (if no concern for ACS)      Seasonal Allergies   - Continue Zyrtec and Singulair       Diet: Soft & Bite Sized Diet (level 6) Thin Liquids (level 0)  Adult Formula Drip Feeding: Continuous Vital 1.5; Jejunostomy; Goal Rate: 40 ml/hr ( start TF with  Vital 1.5 ( or equivalent ), Initiate @ 10 ml/hr and advance by 10 ml Q 8hr as tolerated to goal @ 40 ml/hr.  Do not start or advance unless K+ >3....    DVT Prophylaxis: Pneumatic Compression Devices  Piedra Catheter: Not present  Lines: PRESENT      Port A Cath Single 12/09/22 Right Chest wall-Site Assessment: WDL      Cardiac Monitoring: None  Code Status: No CPR- Pre-arrest intubation OK      Clinically Significant Risk Factors              # Hypoalbuminemia: Lowest albumin = 3.2 g/dL at 6/5/2023  7:03 AM, will monitor as appropriate                     Disposition Plan       Expected Discharge Date: 06/09/2023      Destination: home with family  Discharge Comments: Dispo: Home, resume [facility] for TF, TPN  Delays: Establish nutrition (increasing j tube feeds), headache, Dietician consulted, IV pain meds          Marcela Meade MD  Hospitalist Service, GOLD TEAM 8  [facility]  Securely message with [employer] (more info)  Text page via [facility] Paging/Directory   See signed in provider for up to date coverage information  ______________________________________________________________________    Interval History   Doing ok  TF adjustments keeping her inpatient  No chest pain/sob/NVD        Physical Exam   Vital Signs: Temp: 98.2  F (36.8  C) Temp src: Oral BP: 125/73 Pulse: 78   Resp: 16 SpO2: 97 % O2 Device: None (Room air)    Weight: 130 lbs 11.2 oz    General: No acute distress, breathing comfortably on room air  Neuro: EOMI, PERRLA. Facial muscles symmetric, strength/sensation grossly intact.  HEENT: Anicteric sclera. Oropharynx is clear. No lymphadenopathy.  Chest/Lungs: No accessory respiratory muscle use. Adequate air movement throughout. CTAB.  CV: Normal rate, regular rhythm. nl S1/S2. No m/r/g. Cap refill &lt;2s.  Abd: BS+. Soft, NTND.  Ext: Warm, well-perfused. Strong distal pulses.      Medical Decision Making       40 MINUTES SPENT BY ME on the date of service doing chart review, history, exam, documentation & further activities per the note.      Data     I have personally reviewed the following data over the past 24 hrs:    5.5  \   11.7   / 277     139 103 10.1 /  157 (H)   3.8 27 0.53 \       ALT: 133 (H) AST: 30 AP: 174 (H) TBILI: 0.4   ALB: 3.5 TOT PROTEIN: 6.2 (L) LIPASE: N/A       Imaging results reviewed over the past 24 hrs:   No results found for this or any previous visit (from the past 24 hour(s)).  Recent Labs   Lab 06/08/23  1148 06/08/23  0743 06/08/23  0706 06/07/23  0739 06/07/23  0707 06/06/23  0958 06/06/23  0549  06/04/23 2231 06/04/23  1348   WBC  --   --  5.5  --  4.8  --  8.8   < > 8.8   HGB  --   --  11.7  --  11.6*  --  11.1*   < > 11.8   MCV  --   --  95  --  95  --  95   < > 96   PLT  --   --  277  --  228  --  184   < > 164   INR  --   --   --   --   --   --   --   --  1.15   NA  --   --  139  --  139  --  139   < > 140   POTASSIUM  --   --  3.8  --  3.8  --  3.7   < > 3.5   CHLORIDE  --   --  103  --  102  --  103   < > 105   CO2  --   --  27  --  26  --  26   < > 21*   BUN  --   --  10.1  --  4.8*  --  3.5*   < > 11.0   CR  --   --  0.53  --  0.54  --  0.46*   < > 0.50*   ANIONGAP  --   --  9  --  11  --  10   < > 14   KASSI  --   --  8.6  --  9.2  --  8.7   < > 8.9   * 137* 124*   < > 103*   < > 132*   < > 192*   ALBUMIN  --   --  3.5  --  3.6  --  3.3*   < > 3.6   PROTTOTAL  --   --  6.2*  --  6.5  --  6.0*   < > 6.2*   BILITOTAL  --   --  0.4  --  0.5  --  0.5   < > 0.7   ALKPHOS  --   --  174*  --  197*  --  210*   < > 281*   ALT  --   --  133*  --  177*  --  221*   < > 434*   AST  --   --  30  --  44*  --  67*   < > 305*   LIPASE  --   --   --   --   --   --   --   --  6*    < > = values in this interval not displayed.

## 2023-06-08 NOTE — PROGRESS NOTES
Brief Medicine Cross Cover Note:    Gold Cross Cover paged by GI for colonoscopy tomorrow.     Interventions:  - mIVF @ 100 mL/hr LR started  -Clear liquid diet, hold TF, NPO at midnight  - 0400 CMP, CBC, Mg, Phos   - RN to coordinate with Lab to ensure at 0400 and prior to procedure   - Tap water enemas on 6/9 at 0530, 0600, and 0630    Martha Good, CNP, APRN  Internal Medicine [facility]IG[facility] Hospitalist  [facility]  [phone]  Securely message with the Vocera Web Console   Text page via [employer] Paging/Directory   Text page via [employer]

## 2023-06-09 ENCOUNTER — ANESTHESIA (OUTPATIENT)
Dept: GASTROENTEROLOGY | Facility: CLINIC | Age: 58
End: 2023-06-09
Payer: COMMERCIAL

## 2023-06-09 LAB
ALBUMIN SERPL BCG-MCNC: 3.3 G/DL (ref 3.5–5.2)
ALP SERPL-CCNC: 154 U/L (ref 35–104)
ALT SERPL W P-5'-P-CCNC: 101 U/L (ref 10–35)
ANION GAP SERPL CALCULATED.3IONS-SCNC: 10 MMOL/L (ref 7–15)
AST SERPL W P-5'-P-CCNC: 31 U/L (ref 10–35)
BILIRUB SERPL-MCNC: 0.4 MG/DL
BUN SERPL-MCNC: 11.7 MG/DL (ref 6–20)
CALCIUM SERPL-MCNC: 8.8 MG/DL (ref 8.6–10)
CHLORIDE SERPL-SCNC: 103 MMOL/L (ref 98–107)
CREAT SERPL-MCNC: 0.51 MG/DL (ref 0.51–0.95)
CRP SERPL-MCNC: 11.2 MG/L
DEPRECATED HCO3 PLAS-SCNC: 26 MMOL/L (ref 22–29)
ERYTHROCYTE [DISTWIDTH] IN BLOOD BY AUTOMATED COUNT: 13.6 % (ref 10–15)
GFR SERPL CREATININE-BSD FRML MDRD: >90 ML/MIN/1.73M2
GLUCOSE BLDC GLUCOMTR-MCNC: 105 MG/DL (ref 70–99)
GLUCOSE BLDC GLUCOMTR-MCNC: 127 MG/DL (ref 70–99)
GLUCOSE BLDC GLUCOMTR-MCNC: 135 MG/DL (ref 70–99)
GLUCOSE BLDC GLUCOMTR-MCNC: 71 MG/DL (ref 70–99)
GLUCOSE BLDC GLUCOMTR-MCNC: 82 MG/DL (ref 70–99)
GLUCOSE BLDC GLUCOMTR-MCNC: 83 MG/DL (ref 70–99)
GLUCOSE BLDC GLUCOMTR-MCNC: 92 MG/DL (ref 70–99)
GLUCOSE BLDC GLUCOMTR-MCNC: 92 MG/DL (ref 70–99)
GLUCOSE SERPL-MCNC: 100 MG/DL (ref 70–99)
HCT VFR BLD AUTO: 34 % (ref 35–47)
HGB BLD-MCNC: 11 G/DL (ref 11.7–15.7)
MAGNESIUM SERPL-MCNC: 1.9 MG/DL (ref 1.7–2.3)
MCH RBC QN AUTO: 30.5 PG (ref 26.5–33)
MCHC RBC AUTO-ENTMCNC: 32.4 G/DL (ref 31.5–36.5)
MCV RBC AUTO: 94 FL (ref 78–100)
PHOSPHATE SERPL-MCNC: 3.5 MG/DL (ref 2.5–4.5)
PLATELET # BLD AUTO: 302 10E3/UL (ref 150–450)
POTASSIUM SERPL-SCNC: 4.1 MMOL/L (ref 3.4–5.3)
PROCALCITONIN SERPL IA-MCNC: 0.49 NG/ML
PROT SERPL-MCNC: 5.8 G/DL (ref 6.4–8.3)
RBC # BLD AUTO: 3.61 10E6/UL (ref 3.8–5.2)
SODIUM SERPL-SCNC: 139 MMOL/L (ref 136–145)
WBC # BLD AUTO: 6.5 10E3/UL (ref 4–11)

## 2023-06-09 PROCEDURE — 99232 SBSQ HOSP IP/OBS MODERATE 35: CPT | Performed by: INTERNAL MEDICINE

## 2023-06-09 PROCEDURE — C9113 INJ PANTOPRAZOLE SODIUM, VIA: HCPCS | Performed by: PHYSICIAN ASSISTANT

## 2023-06-09 PROCEDURE — 80053 COMPREHEN METABOLIC PANEL: CPT

## 2023-06-09 PROCEDURE — 0DBL8ZX EXCISION OF TRANSVERSE COLON, VIA NATURAL OR ARTIFICIAL OPENING ENDOSCOPIC, DIAGNOSTIC: ICD-10-PCS | Performed by: INTERNAL MEDICINE

## 2023-06-09 PROCEDURE — 84100 ASSAY OF PHOSPHORUS: CPT

## 2023-06-09 PROCEDURE — 0DBP8ZX EXCISION OF RECTUM, VIA NATURAL OR ARTIFICIAL OPENING ENDOSCOPIC, DIAGNOSTIC: ICD-10-PCS | Performed by: INTERNAL MEDICINE

## 2023-06-09 PROCEDURE — 250N000013 HC RX MED GY IP 250 OP 250 PS 637: Performed by: PHYSICIAN ASSISTANT

## 2023-06-09 PROCEDURE — 85027 COMPLETE CBC AUTOMATED: CPT

## 2023-06-09 PROCEDURE — 258N000003 HC RX IP 258 OP 636

## 2023-06-09 PROCEDURE — 45380 COLONOSCOPY AND BIOPSY: CPT | Performed by: INTERNAL MEDICINE

## 2023-06-09 PROCEDURE — 250N000011 HC RX IP 250 OP 636: Performed by: PHYSICIAN ASSISTANT

## 2023-06-09 PROCEDURE — 88305 TISSUE EXAM BY PATHOLOGIST: CPT | Mod: TC | Performed by: INTERNAL MEDICINE

## 2023-06-09 PROCEDURE — 250N000011 HC RX IP 250 OP 636: Performed by: NURSE ANESTHETIST, CERTIFIED REGISTERED

## 2023-06-09 PROCEDURE — 258N000003 HC RX IP 258 OP 636: Performed by: STUDENT IN AN ORGANIZED HEALTH CARE EDUCATION/TRAINING PROGRAM

## 2023-06-09 PROCEDURE — 88305 TISSUE EXAM BY PATHOLOGIST: CPT | Mod: 26 | Performed by: PATHOLOGY

## 2023-06-09 PROCEDURE — 250N000011 HC RX IP 250 OP 636: Performed by: INTERNAL MEDICINE

## 2023-06-09 PROCEDURE — 258N000001 HC RX 258: Performed by: PHYSICIAN ASSISTANT

## 2023-06-09 PROCEDURE — 36591 DRAW BLOOD OFF VENOUS DEVICE: CPT

## 2023-06-09 PROCEDURE — 250N000011 HC RX IP 250 OP 636: Performed by: STUDENT IN AN ORGANIZED HEALTH CARE EDUCATION/TRAINING PROGRAM

## 2023-06-09 PROCEDURE — 86140 C-REACTIVE PROTEIN: CPT | Performed by: PHYSICIAN ASSISTANT

## 2023-06-09 PROCEDURE — 258N000003 HC RX IP 258 OP 636: Performed by: NURSE ANESTHETIST, CERTIFIED REGISTERED

## 2023-06-09 PROCEDURE — 0DBM8ZX EXCISION OF DESCENDING COLON, VIA NATURAL OR ARTIFICIAL OPENING ENDOSCOPIC, DIAGNOSTIC: ICD-10-PCS | Performed by: INTERNAL MEDICINE

## 2023-06-09 PROCEDURE — 250N000013 HC RX MED GY IP 250 OP 250 PS 637: Performed by: STUDENT IN AN ORGANIZED HEALTH CARE EDUCATION/TRAINING PROGRAM

## 2023-06-09 PROCEDURE — 120N000002 HC R&B MED SURG/OB UMMC

## 2023-06-09 PROCEDURE — 0DBH8ZX EXCISION OF CECUM, VIA NATURAL OR ARTIFICIAL OPENING ENDOSCOPIC, DIAGNOSTIC: ICD-10-PCS | Performed by: INTERNAL MEDICINE

## 2023-06-09 PROCEDURE — 0DBN8ZX EXCISION OF SIGMOID COLON, VIA NATURAL OR ARTIFICIAL OPENING ENDOSCOPIC, DIAGNOSTIC: ICD-10-PCS | Performed by: INTERNAL MEDICINE

## 2023-06-09 PROCEDURE — 83735 ASSAY OF MAGNESIUM: CPT

## 2023-06-09 PROCEDURE — 84145 PROCALCITONIN (PCT): CPT | Performed by: PHYSICIAN ASSISTANT

## 2023-06-09 PROCEDURE — 88342 IMHCHEM/IMCYTCHM 1ST ANTB: CPT | Mod: 26 | Performed by: PATHOLOGY

## 2023-06-09 PROCEDURE — 0DBK8ZX EXCISION OF ASCENDING COLON, VIA NATURAL OR ARTIFICIAL OPENING ENDOSCOPIC, DIAGNOSTIC: ICD-10-PCS | Performed by: INTERNAL MEDICINE

## 2023-06-09 PROCEDURE — 370N000017 HC ANESTHESIA TECHNICAL FEE, PER MIN: Performed by: INTERNAL MEDICINE

## 2023-06-09 RX ORDER — PROPOFOL 10 MG/ML
INJECTION, EMULSION INTRAVENOUS PRN
Status: DISCONTINUED | OUTPATIENT
Start: 2023-06-09 | End: 2023-06-09

## 2023-06-09 RX ORDER — MAGNESIUM SULFATE HEPTAHYDRATE 40 MG/ML
2 INJECTION, SOLUTION INTRAVENOUS ONCE
Status: COMPLETED | OUTPATIENT
Start: 2023-06-09 | End: 2023-06-09

## 2023-06-09 RX ORDER — GUAIFENESIN 600 MG/1
15 TABLET, EXTENDED RELEASE ORAL DAILY
Qty: 450 ML | Refills: 0 | Status: SHIPPED | OUTPATIENT
Start: 2023-06-10 | End: 2023-07-10

## 2023-06-09 RX ORDER — FENTANYL CITRATE 50 UG/ML
25 INJECTION, SOLUTION INTRAMUSCULAR; INTRAVENOUS
Status: CANCELLED | OUTPATIENT
Start: 2023-06-09

## 2023-06-09 RX ORDER — SODIUM CHLORIDE, SODIUM LACTATE, POTASSIUM CHLORIDE, CALCIUM CHLORIDE 600; 310; 30; 20 MG/100ML; MG/100ML; MG/100ML; MG/100ML
INJECTION, SOLUTION INTRAVENOUS CONTINUOUS PRN
Status: DISCONTINUED | OUTPATIENT
Start: 2023-06-09 | End: 2023-06-09

## 2023-06-09 RX ORDER — PROPOFOL 10 MG/ML
INJECTION, EMULSION INTRAVENOUS CONTINUOUS PRN
Status: DISCONTINUED | OUTPATIENT
Start: 2023-06-09 | End: 2023-06-09

## 2023-06-09 RX ADMIN — PROMETHAZINE HYDROCHLORIDE 25 MG: 25 INJECTION INTRAMUSCULAR; INTRAVENOUS at 11:41

## 2023-06-09 RX ADMIN — DEXTROSE 50 % IN WATER (D50W) INTRAVENOUS SYRINGE 25 ML: at 08:14

## 2023-06-09 RX ADMIN — METHOCARBAMOL 750 MG: 750 TABLET ORAL at 17:10

## 2023-06-09 RX ADMIN — SODIUM CHLORIDE, POTASSIUM CHLORIDE, SODIUM LACTATE AND CALCIUM CHLORIDE: 600; 310; 30; 20 INJECTION, SOLUTION INTRAVENOUS at 03:38

## 2023-06-09 RX ADMIN — MONTELUKAST 10 MG: 10 TABLET, FILM COATED ORAL at 21:55

## 2023-06-09 RX ADMIN — PANCRELIPASE 3 CAPSULE: 24000; 76000; 120000 CAPSULE, DELAYED RELEASE PELLETS ORAL at 17:10

## 2023-06-09 RX ADMIN — PROPOFOL 150 MCG/KG/MIN: 10 INJECTION, EMULSION INTRAVENOUS at 08:04

## 2023-06-09 RX ADMIN — HYDROMORPHONE HYDROCHLORIDE 0.3 MG: 1 INJECTION, SOLUTION INTRAMUSCULAR; INTRAVENOUS; SUBCUTANEOUS at 10:36

## 2023-06-09 RX ADMIN — METHOCARBAMOL 750 MG: 750 TABLET ORAL at 10:43

## 2023-06-09 RX ADMIN — SODIUM CHLORIDE, POTASSIUM CHLORIDE, SODIUM LACTATE AND CALCIUM CHLORIDE: 600; 310; 30; 20 INJECTION, SOLUTION INTRAVENOUS at 22:53

## 2023-06-09 RX ADMIN — MAGNESIUM SULFATE IN WATER 2 G: 40 INJECTION, SOLUTION INTRAVENOUS at 17:09

## 2023-06-09 RX ADMIN — PANTOPRAZOLE SODIUM 40 MG: 40 INJECTION, POWDER, FOR SOLUTION INTRAVENOUS at 10:48

## 2023-06-09 RX ADMIN — CETIRIZINE HYDROCHLORIDE 10 MG: 10 TABLET, FILM COATED ORAL at 10:47

## 2023-06-09 RX ADMIN — Medication 15 ML: at 10:48

## 2023-06-09 RX ADMIN — LEVOTHYROXINE SODIUM 137 MCG: 0.14 TABLET ORAL at 10:46

## 2023-06-09 RX ADMIN — SODIUM CHLORIDE, POTASSIUM CHLORIDE, SODIUM LACTATE AND CALCIUM CHLORIDE: 600; 310; 30; 20 INJECTION, SOLUTION INTRAVENOUS at 10:41

## 2023-06-09 RX ADMIN — AMITRIPTYLINE HYDROCHLORIDE 40 MG: 10 TABLET, FILM COATED ORAL at 21:54

## 2023-06-09 RX ADMIN — PROMETHAZINE HYDROCHLORIDE 25 MG: 25 INJECTION INTRAMUSCULAR; INTRAVENOUS at 18:26

## 2023-06-09 RX ADMIN — OXYCODONE HYDROCHLORIDE 5 MG: 5 SOLUTION ORAL at 21:54

## 2023-06-09 RX ADMIN — SODIUM CHLORIDE, POTASSIUM CHLORIDE, SODIUM LACTATE AND CALCIUM CHLORIDE: 600; 310; 30; 20 INJECTION, SOLUTION INTRAVENOUS at 08:01

## 2023-06-09 RX ADMIN — PROPOFOL 20 MG: 10 INJECTION, EMULSION INTRAVENOUS at 08:15

## 2023-06-09 RX ADMIN — THIAMINE HCL TAB 100 MG 100 MG: 100 TAB at 11:01

## 2023-06-09 RX ADMIN — FAMOTIDINE 20 MG: 40 POWDER, FOR SUSPENSION ORAL at 21:54

## 2023-06-09 RX ADMIN — METHOCARBAMOL 750 MG: 750 TABLET ORAL at 21:55

## 2023-06-09 RX ADMIN — OXYCODONE HYDROCHLORIDE 5 MG: 5 SOLUTION ORAL at 13:20

## 2023-06-09 RX ADMIN — ERTAPENEM SODIUM 1 G: 1 INJECTION, POWDER, LYOPHILIZED, FOR SOLUTION INTRAMUSCULAR; INTRAVENOUS at 13:20

## 2023-06-09 RX ADMIN — OXYCODONE HYDROCHLORIDE 5 MG: 5 SOLUTION ORAL at 17:10

## 2023-06-09 RX ADMIN — PROPOFOL 30 MG: 10 INJECTION, EMULSION INTRAVENOUS at 08:11

## 2023-06-09 RX ADMIN — HYDROMORPHONE HYDROCHLORIDE 0.3 MG: 1 INJECTION, SOLUTION INTRAMUSCULAR; INTRAVENOUS; SUBCUTANEOUS at 02:04

## 2023-06-09 ASSESSMENT — ACTIVITIES OF DAILY LIVING (ADL)
ADLS_ACUITY_SCORE: 24

## 2023-06-09 NOTE — OR NURSING
Patient brought to PACU for inpatient MAC eval; patient did not met admission criteria to PACU. Anesthesia team provided report to inpatient RN; patient placed on pulse ox. / capno monitor and transported to inpatient bed assignment.

## 2023-06-09 NOTE — PROGRESS NOTES
Care Management Follow Up    Length of Stay (days): 5  Expected Discharge Date: 06/10/2023   Concerns to be Addressed: discharge planning    Patient plan of care discussed at interdisciplinary rounds: Yes  Anticipated Discharge Disposition: Home    Anticipated Discharge Services: Home Care, Home Infusion  Anticipated Discharge DME:  Resumed TF, IVF, TPN vacation, Gtube and Jtube supplies  Patient/family educated on Medicare website which has current facility and service quality ratings: n/a   Education Provided on the Discharge Plan:  yes  Patient/Family in Agreement with the Plan: yes  Referrals Placed by CM/SW:    [facility]  [facility] Liaison, Makayla Ascencio  [address] [place] [address]  Phone: [phone]  Fax: [phone]  - Resume TF (Vital 1.5) & IVF (TPN holiday)  **MD to clarify which IVF for home use (LR vs NS) paged @ 9051     [facility]  [address] [place] [address] [place] [address]   Phone: [phone]  Fax: [phone]   - Weekend CM to fax IATF MD (discharge) orders Saturday; verbal care resumption accepted       Private pay costs discussed: Not applicable    Additional Information:  Pt will be going home tomorrow. [facility] liaison has talked to the pt and updated the [facility] office; pt receives IVFs at home and has used both LR and NS (LR and TPN/Lipids incompatible). Liaison clarified issue: Request for discharging MD to decide and put in orders, which IVFs they want her on at home, as both seen in med list; MD paged. CM team to continue to follow and support safe discharge planning.       Edi Barger RN BSN  5B RNCC  Phone: [phone]  Pager: [phone]    For Weekend & Holiday on call RN Care Coordinator:  (Tasks: Home care, home infusion, medical equipment, transportation, IMM & [facility]ENDERSON forms, etc.)  [place] (0800 - 1630) Saturday and Sunday    Units: 4A, 4C, 4E, 5A and 5B: [phone]    Units: 6A, 6B, 6C, 6D: [phone]    Units: 7A,  7B, 7C, 7D, and 5C: [phone]    [facility] (0953-1029) Saturday and Sunday    Units: 5 Ortho, 8A, 10 ICU, & Pediatric Units: [phone]

## 2023-06-09 NOTE — PROGRESS NOTES
[facility]    Medicine Progress Note - Hospitalist Service, GOLD TEAM 8    Date of Admission:  6/4/2023    Assessment & Plan    Dinora Mcghee is a 58 year old woman with history including gallstone s/p cholecystectomy (1996), multiple ERCP c/b recurrent pancreatitis, gastroparesis c/b chronic nausea and vomiting, chronic malnutrition on TF and TPN presented to [facility] ED on 6/4/23 with two days of fever and body aches.     Sepsis - improved/resolved  Klebsiella pneumonia bacteremia (2 of 2 Bcx 6/4/23)  Elevated Lactate  Body Aches   Asplenia  Onset of fevers (highest 101.7F day of adm), widespread body aches (head to toes, no meningeal signs), rising LFTs, poor appetite, acute on chronic abdominal pain and worsening of baseline diarrhea. Lactate 2.7-->1.3 with fluids. WBC 8.8 with no left shift. Procal 6.33. UA no e/o infection. CXR no infiltrate. CTA C/A/P at [place] 6/3/23 with no acute findings. CLABSI vs cholangitis vs other.   - Concern for possible line infection: blood cultures pending, continue Ertapenem and Vancomycin started in ED for now. IVF hydration overnight.    - Initially concern for possible tick-born illness (no known tick bites but has found several on her dogs): Labs sent for lyme (Ab neg), ehrlichia, babesia.   - Beta D glucan pending  - Urine culture pending   - Transplant ID consulted, signed off  - s/p meropenem; Continue ertapenem 1g q24 hrs (6/6-6/18), plan for 14 day course via port  - Surveillance cultures 7d after antibiotics completed  - Stop vancomycin     Transaminitis - improving  Noted rising LFTs as of ~3 weeks ago possibly related to TPN. Lipase and total bili WNL. . LFTs are higher than documented at [place] in [place] day prior to admission.   - Hep A (IgM neg), B (pdg), C (IgG neg)   - INR normal, suggesting intact synthetic function   - MRCP to r/o cholangitis, negative; no ERCP per GI   - ID abx per above x 14 days via  port   - GI consulted     Acute on Chronic Abdominal Pain  Related to above complicated GI history. Seeing [facility] and being evaluated for spinal cord stimulator. Patient reporting typically has abdominal pain related to gastroparesis, but has been significantly worse particularly on the left side in addition to wide spread body aches from her head to her toes. COVID/Flu/RSV negative on 6/3/23.   - Oxycodone and dilaudid available for now, please wean as tolerated   - Continue PTA Robaxin QID prn and Flexeril at bedtime prn, continue PTA Elavil at bedtime.   - acetaminophen maximum 2g daily with transaminase elevation  - GI completed colonoscopy 6/9, biopsies pending; no other concerning findings      J Tube in place  G Tube in place  Gastroparesis c/b Chronic Nausea  Chronic Constipation   - Regular diet (eating limited amounts of food at home)   - TPN on hold until cultures seen to clear   - TF to resume 6/5 (appreciate nutrition assistance)   - Phenergan and Compazine available for nausea   - Continue PTA Motegrity and scheduled senna (hold for loose stools)   - Scopolamine patch   - Line/drain cares   - Monitor I&O   - Please note that she puts crushed pills through her G tube, all liquid meds and TF through J tube.     Severe Malnutrition  Hypokalemia  Hypomagnesemia  Hypophosphatemia  On TPN (unsure of rate currently, runs for 14 hours each day concurrently with TF) and TF (vivonex rate 30cc/hr x 14 hours daily, starting ~2000 each night). Regular diet as well, though has not eaten x 3 days secondary to feeling very poorly. Mag 1.5, phos 1.8, K 3.5 at admission.   - Aggressively replace electrolytes and continue thiamine   - Nutrition consult for TF to resume (after MRCP result)   - TPN on hold until line infection ruled out   - Continue regular diet as tolerated   - LR ordered for now at low rate     Acute on Chronic Diarrhea  Frequently with loose stools but reports significantly worse  past 3 days (no melena or hematochezia). Is having acute on chronic abdominal pain.   - Check for C. Diff and stool culture panel - will discontinue with no stools 6/6 and alternative infectious source     Chest Pressure  Noted alongside body aches, resolved with dilaudid. EKG without evidence of ST segment elevation or depression. Trop 7, <6.  - Stop telemetry     History of pancreatectomy related DM  S/p islet cell transplant 12/28/2016  Typically there is insulin in her TPN. Last A1C 5.5 ~6 months ago.   - BG Q4H with hypoglycemia protocol   - If concern for hyperglycemia, can consider sliding scale insulin   - If hypoglycemia, can start D10 (typical TF rate 30cc/hr so would need this at the very minimum).   - Continue creon with meals     Incidental Lung Nodule  3 mm right upper lobe nodule on series 3 image 103 noted on CTA C/A/P 6/3/23 done at [place].   - Will need follow up imaging     GERD  Candida esophagitis  Finished fluconazole 400 mg suspension daily per G-tube for 21 day course.   - Continue PTA IV protonix in am and pepcid in pm     Hypothyroidism   - Continue PTA levothyroxine     H/o Migraines  Uses Botox injection and Zomig at home.   - Please resume PTA Zomig if repeat troponin negative (if no concern for ACS)      Seasonal Allergies   - Continue Zyrtec and Singulair       Diet: Adult Formula Drip Feeding: Continuous Vital 1.5; Jejunostomy; Goal Rate: *HOLD TF NOW (6/8) for planned colonoscopy 6/9.; mL/hr  NPO per Anesthesia Guidelines for Procedure/Surgery Except for: Meds    DVT Prophylaxis: Pneumatic Compression Devices  [place] Catheter: Not present  Lines: PRESENT      Port A Cath Single 12/09/22 Right Chest wall-Site Assessment: WDL      Cardiac Monitoring: None  Code Status: No CPR- Pre-arrest intubation OK      Clinically Significant Risk Factors              # Hypoalbuminemia: Lowest albumin = 3.2 g/dL at 6/5/2023  7:03 AM, will monitor as appropriate                     Disposition Plan       Expected Discharge Date: 06/10/2023      Destination: home with family  Discharge Comments: Dispo: Home, resume [facility] for TF, TPN  Progress: 6/9 colonoscopy  Delays: Establish nutrition (increasing j tube feeds), headache, Dietician consulted, IV pain meds          Marcela Meade MD  Hospitalist Service, GOLD TEAM 8  [facility]  Securely message with [employer] (more info)  Text page via [facility] Paging/Directory   See signed in provider for up to date coverage information  ______________________________________________________________________    Interval History   [place] completed  No chest pain/sob/NVD        Physical Exam   Vital Signs: Temp: (!) 96.6  F (35.9  C) Temp src: Axillary BP: 121/65 Pulse: 70   Resp: 16 SpO2: 98 % O2 Device: None (Room air)    Weight: 143 lbs 11.84 oz    General: No acute distress, breathing comfortably on room air  Neuro: EOMI, PERRLA. Facial muscles symmetric, strength/sensation grossly intact.  HEENT: Anicteric sclera. Oropharynx is clear. No lymphadenopathy.  Chest/Lungs: No accessory respiratory muscle use. Adequate air movement throughout. CTAB.  CV: Normal rate, regular rhythm. nl S1/S2. No m/r/g. Cap refill &lt;2s.  Abd: BS+. Soft, NTND.  Ext: Warm, well-perfused. Strong distal pulses.      Medical Decision Making       40 MINUTES SPENT BY ME on the date of service doing chart review, history, exam, documentation & further activities per the note.      Data     I have personally reviewed the following data over the past 24 hrs:    6.5  \   11.0 (L)   / 302     139 103 11.7 /  105 (H)   4.1 26 0.51 \       ALT: 101 (H) AST: 31 AP: 154 (H) TBILI: 0.4   ALB: 3.3 (L) TOT PROTEIN: 5.8 (L) LIPASE: N/A       Procal: 0.49 (H) CRP: 11.20 (H) Lactic Acid: N/A         Imaging results reviewed over the past 24 hrs:   No results found for this or any previous visit (from the past 24 hour(s)).  Recent Labs   Lab 06/09/23  1103 06/09/23  0901  06/09/23  0757 06/09/23  0551 06/09/23  0445 06/08/23  0743 06/08/23  0706 06/07/23  0739 06/07/23  0707 06/04/23  2231 06/04/23  1348   WBC  --   --   --   --  6.5  --  5.5  --  4.8   < > 8.8   HGB  --   --   --   --  11.0*  --  11.7  --  11.6*   < > 11.8   MCV  --   --   --   --  94  --  95  --  95   < > 96   PLT  --   --   --   --  302  --  277  --  228   < > 164   INR  --   --   --   --   --   --   --   --   --   --  1.15   NA  --   --   --   --  139  --  139  --  139   < > 140   POTASSIUM  --   --   --   --  4.1  --  3.8  --  3.8   < > 3.5   CHLORIDE  --   --   --   --  103  --  103  --  102   < > 105   CO2  --   --   --   --  26  --  27  --  26   < > 21*   BUN  --   --   --   --  11.7  --  10.1  --  4.8*   < > 11.0   CR  --   --   --   --  0.51  --  0.53  --  0.54   < > 0.50*   ANIONGAP  --   --   --   --  10  --  9  --  11   < > 14   KASSI  --   --   --   --  8.8  --  8.6  --  9.2   < > 8.9   * 127* 71   < > 100*   < > 124*   < > 103*   < > 192*   ALBUMIN  --   --   --   --  3.3*  --  3.5  --  3.6   < > 3.6   PROTTOTAL  --   --   --   --  5.8*  --  6.2*  --  6.5   < > 6.2*   BILITOTAL  --   --   --   --  0.4  --  0.4  --  0.5   < > 0.7   ALKPHOS  --   --   --   --  154*  --  174*  --  197*   < > 281*   ALT  --   --   --   --  101*  --  133*  --  177*   < > 434*   AST  --   --   --   --  31  --  30  --  44*   < > 305*   LIPASE  --   --   --   --   --   --   --   --   --   --  6*    < > = values in this interval not displayed.

## 2023-06-09 NOTE — PLAN OF CARE
RN assumed cares at [phone]     Vitals: VSS on room air.  Pain: 8/10 pain located in abdomen. Relief from PRN oxy.  Neuro: A&Ox4; calm and cooperative.   Cardiac: WDL.   Peripheral neurovascular: WDL.  Respiratory: Lung sounds clear and diminished. No respiratory distress noted.  GI/: Continent of bladder. 1x loose BM reported during shift. C/o nausea. PRN promethazine iv given.  IV/Drains: Port running LR at 100mL/hr. J tube for liquid meds and tube feeding. G tube for crushed meds.  Skin: No new concerns. Benedryl cream to rash from tape on L midarm.   Activity: up independently.  Nutrition: Not on tube feeding. NPO after midnight.  Labs: on RN managed K+, Mg, and Phos replacement protocol. Lab to recheck at 0400.     Events: No acute events during shift. Q2hr rounding completed. Call light within reach and is able to make needs known.      Plan: Lab draws at 0400. Tap water enema to be given at 0530, 0600, and 0630. Colonoscopy scheduled at 0800. Can return to tube feeding after procedure, but page provider regarding specifics.      Goal Outcome Evaluation:      Plan of Care Reviewed With: patient    Overall Patient Progress: no changeOverall Patient Progress: no change    Outcome Evaluation: TF not running, colonoscopy in AM, c/o abd pain and nausea

## 2023-06-09 NOTE — PLAN OF CARE
"Cares 0700 to 1900    /73 (BP Location: Left arm, Cuff Size: Adult Regular)   Pulse 82   Temp 98  F (36.7  C) (Oral)   Resp 16   Ht 1.727 m (5' 8\")   Wt 59.5 kg (131 lb 1.6 oz)   SpO2 98%   BMI 19.93 kg/m       Pain: Constant abdominal pain, managed well with prn robaxin 2x, oxycodone 2x, & IV dilaudid 1x.   Neuros: WDL  Cardiac: WDL  Resp: Breathing well on room air.   GI/: Abdominal discomfort. Nausea continuous, pt reports nausea managed well with IV phenergan 2x. Watery BMs. Voiding spontaneously. Poor appetite. Oral meds via G tube, liquid meds & tube feeds to J tube. TFs started @1400 at 20 ml/hr per pt tolerance, free water flushes 30 ml every hour. Pt venting G tube intermittently, has drainage bag.   Access: Port a cath infusing LR @100 ml/hr.   Skin: No new concerns.   Activity: Up ad koffi.     Events this shift: Colonoscopy done this morning. Tolerated it well. Magnesium replaced, recheck tmrw.     Goal Outcome Evaluation:    Plan of Care Reviewed With: patient  Overall Patient Progress: improvingOverall Patient Progress: improving  Outcome Evaluation: Manage nausea and increase tube feeds per pt tolerance.      "

## 2023-06-09 NOTE — PROCEDURES
"Refer to \"procedures\" tab for full details of the procedure and findings.    Colonoscopy    Indication: diarrhea, abdominal pain    History: chronic pancreatis, TPAIT, J-tube in place    Findings:  - Poor prep throughout. Extensive cleaning performed.  - Normal TI.   - Melanosis coli changes in cecum and ascending colon. Biopsied.  - Rest of colon mucosa unremarkable. Biopsied.   - Two diminutive polyps (AC and TC respectively) found and removed.   - Diverticulosis in sigmoid colon.  - Int and Ext hemorrhoids.    Recommendations:  - Follow up pathology.  - Inpatient GI following.   "

## 2023-06-09 NOTE — PROVIDER NOTIFICATION
Provider notified (name): Chrystal Kearney MD  Reason for notification: Pt done with colonoscopy. Plz remove NPO orders and unhold tube feed orders.    Addendum: Regular diet order placed. New Tube feed orders in.

## 2023-06-09 NOTE — PLAN OF CARE
Status/Background: Pt is a 58 yr old female who was admitted on 6/4/23 with fevers, aches, and pancreatitis. Hx of gallstone s/p cholecystectomy (1996), s/p multiple ERCP c/b recurrent pancreatitis, chronic abdominal pain, gastroparesis c/b chronic nausea and vomiting, chronic intermittent pancreatitis, s/p splenectomy and appendectomy, chronic malnutrition/FTT on G-tube s/p multiple G/J-tube adjustments/laparotomy s/p TPIT (2016), Hx of GERD, hypothyroidism, uterine tumor s/p hysterectomy now on regular diet, TPN and TF at home   Neuros:  Alert and oriented x 4, calls appropriately.   Vitals: Vitally stable on room air  Pain: Abdominal pain is controlled with Dilaudid IV and Oxycodone PRN.  Resp: LS clear.  GI: Denies nausea, LBM 6/8. Audible bowel sounds. Tap water enema given at 0530, 0600, and [id number] with good results  : Voiding spontaneously.  Cardiovascular: WDL.   Lines/[place]: Right chest Port-a-cath infusing LR at 100 ml/hr  Skin: G-tube and J-tube site reddened.  Diet: NPO for colonoscopy.  Activity: Up independently.  Recommendations/Plan: Will continue to monitor pt, provide for safety, encourage activity, assess response to interventions, update MDs with concerns and changes and follow POC. NPO for Colonoscopy @ 8:00AM; Tap Water Enema completed with good results.

## 2023-06-09 NOTE — ANESTHESIA CARE TRANSFER NOTE
Patient: Dinora Mcghee    Procedure: Procedure(s):  COLONOSCOPY, WITH POLYPECTOMY AND BIOPSY       Diagnosis: Diarrhea [R19.7]  Diagnosis Additional Information: No value filed.    Anesthesia Type:   MAC     Note:    Oropharynx: oropharynx clear of all foreign objects and spontaneously breathing  Level of Consciousness: awake  Oxygen Supplementation: room air    Independent Airway: airway patency satisfactory and stable  Dentition: dentition unchanged  Vital Signs Stable: post-procedure vital signs reviewed and stable  Report to RN Given: handoff report given  Patient transferred to: Phase II  Comments: Report called to floor RN  Handoff Report: Identifed the Patient, Identified the Reponsible Provider, Reviewed the pertinent medical history, Discussed the surgical course, Reviewed Intra-OP anesthesia mangement and issues during anesthesia, Set expectations for post-procedure period and Allowed opportunity for questions and acknowledgement of understanding      Vitals:  Vitals Value Taken Time   BP     Temp     Pulse     Resp     SpO2         Electronically Signed By: NATALY Acosta CRNA  June 9, 2023  8:55 AM

## 2023-06-09 NOTE — OR NURSING
Pt tolerated colonoscopy with random colon biopsies and polypectomy X2, very well, under MAC. Report was given to pts floor nurse. Pt to go to PACU prior to returning to PCU

## 2023-06-09 NOTE — ANESTHESIA PREPROCEDURE EVALUATION
Anesthesia Pre-Procedure Evaluation    Patient: Dinora Mcghee   MRN: [id number] : 1965        Procedure : Procedure(s):  Colonoscopy          Past Medical History:   Diagnosis Date     Allergic rhinitis, cause unspecified      Allergy to other foods     strawberries, apples, celeries, alice, watermelon     Arthritis     left knee     Choledocholithiasis     long after cholecystectomy     Chronic abdominal pain      Chronic constipation      Chronic nausea      Chronic pancreatitis (H)      Degeneration of lumbar or lumbosacral intervertebral disc      Esophageal reflux     w/ hiatal hernia     Gastroparesis      Hiatal hernia      History of pituitary adenoma     s/p resection     Hypothyroidism      Migraines      Mild hyperlipidemia      On tube feeding diet     presence of GJ tube     Pancreatic disease     PD stricture, 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 endometriosis growth     APPENDECTOMY        SECTION       COLONOSCOPY       ENDOSCOPIC INSERTION TUBE GASTROSTOMY N/A 2021    Procedure: Gastrojejonostomy placement with jejunopexy, PEG tube exchange;  Surgeon: Zackery Montoya MD;  [facility]     ENDOSCOPIC RETROGRADE CHOLANGIOPANCREATOGRAM N/A 2015    Procedure: ENDOSCOPIC RETROGRADE CHOLANGIOPANCREATOGRAM;  Surgeon: Mandeep Park MD;  [facility]     ENDOSCOPIC RETROGRADE CHOLANGIOPANCREATOGRAM N/A 2016    Procedure: COMBINED ENDOSCOPIC RETROGRADE CHOLANGIOPANCREATOGRAPHY, PLACE TUBE/STENT;  Surgeon: Mandeep Park MD;  [facility]     ENDOSCOPIC RETROGRADE CHOLANGIOPANCREATOGRAM N/A 3/17/2016    Procedure: COMBINED  ENDOSCOPIC RETROGRADE CHOLANGIOPANCREATOGRAPHY, REMOVE FOREIGN BODY OR STENT/TUBE;  Surgeon: Mandeep Park MD;  [facility]     ENDOSCOPIC RETROGRADE CHOLANGIOPANCREATOGRAM N/A 8/2/2016    Procedure: COMBINED ENDOSCOPIC RETROGRADE CHOLANGIOPANCREATOGRAPHY, PLACE TUBE/STENT;  Surgeon: Mandeep Park MD;  [facility]     ENDOSCOPIC RETROGRADE CHOLANGIOPANCREATOGRAM N/A 8/26/2016    Procedure: COMBINED ENDOSCOPIC RETROGRADE CHOLANGIOPANCREATOGRAPHY, REMOVE FOREIGN BODY OR STENT/TUBE;  Surgeon: Mandeep Park MD;  [facility]     ENDOSCOPIC ULTRASOUND UPPER GASTROINTESTINAL TRACT (GI) N/A 10/3/2016    Procedure: ENDOSCOPIC ULTRASOUND, ESOPHAGOSCOPY / UPPER GASTROINTESTINAL TRACT (GI);  Surgeon: Guru Jose Rodas MD;  [facility]     ENTEROSCOPY SMALL BOWEL N/A 2/3/2022    Procedure: ENTEROSCOPY with possible fistula closure;  Surgeon: Francisco Dodson MD;  Location:  GI     ESOPHAGOSCOPY, GASTROSCOPY, DUODENOSCOPY (EGD), COMBINED N/A 6/24/2015    Procedure: COMBINED ESOPHAGOSCOPY, GASTROSCOPY, DUODENOSCOPY (EGD), REMOVE FOREIGN BODY;  Surgeon: Mandeep Park MD;  [facility]     ESOPHAGOSCOPY, GASTROSCOPY, DUODENOSCOPY (EGD), COMBINED N/A 10/25/2015    Procedure: COMBINED ESOPHAGOSCOPY, GASTROSCOPY, DUODENOSCOPY (EGD);  Surgeon: Sammy Amaro MD;  [facility]     ESOPHAGOSCOPY, GASTROSCOPY, DUODENOSCOPY (EGD), COMBINED N/A 10/25/2015    Procedure: COMBINED ESOPHAGOSCOPY, GASTROSCOPY, DUODENOSCOPY (EGD), BIOPSY SINGLE OR MULTIPLE;  Surgeon: Sammy Amaro MD;  Location:  GI     ESOPHAGOSCOPY, GASTROSCOPY, DUODENOSCOPY (EGD), COMBINED N/A 1/31/2023    Procedure: ESOPHAGOGASTRODUODENOSCOPY (EGD) with peg replacement ;  Surgeon: Mandeep Park MD;  [facility]     ESOPHAGOSCOPY, GASTROSCOPY, DUODENOSCOPY (EGD), DILATATION, COMBINED       EXCISE LESION TRUNK N/A 4/17/2017    Procedure: EXCISE LESION TRUNK;  Removal of  Abdominal Foreign Body;  Surgeon: Nestor Phoenix MD;  [facility]     HC ESOPH/GAS REFLUX TEST W NASAL IMPED >1 HR N/A 11/19/2015    Procedure: ESOPHAGEAL IMPEDENCE FUNCTION TEST WITH 24 HOUR PH GREATER THAN 1 HOUR;  Surgeon: Thiago Apple MD;  [facility]     HC UGI ENDOSCOPY DIAG W BIOPSY  9/17/08     HC UGI ENDOSCOPY DIAG W BIOPSY  9/27/12     HC UGI ENDOSCOPY W ESOPHAGEAL DILATION BALLOON <30MM  9/17/08     HC UGI ENDOSCOPY W EUS N/A 5/5/2015    Procedure: COMBINED ENDOSCOPIC ULTRASOUND, ESOPHAGOSCOPY, GASTROSCOPY, DUODENOSCOPY (EGD);  Surgeon: Wm Dueñas MD;  [facility]     HC WRIST ARTHROSCOP,RELEASE XVERS LIG Bilateral 12/17/08     INJECT TRANSVERSUS ABDOMINIS PLANE (TAP) BLOCK BILATERAL Left 9/22/2016    Procedure: INJECT TRANSVERSUS ABDOMINIS PLANE (TAP) BLOCK BILATERAL;  Surgeon: Dickson Corrigan MD;  [facility]     INJECT TRIGGER POINT Bilateral 9/8/2022    Procedure: Abdominal trigger point injection with ultrasound;  Surgeon: Monika Mahajan MD;  [facility]     INJECT TRIGGER POINT SINGLE / MULTIPLE 3 OR MORE MUSCLES Right 11/15/2022    Procedure: Trigger point injections right abdomen with ultrasound guidance;  Surgeon: Monika Mahajan MD;  [facility]     IR CHEST PORT PLACEMENT < 5 YRS OF AGE  6/10/2022     laparoscopic pineda  1995     LAPAROSCOPIC HERNIORRHAPHY INCISIONAL N/A 8/23/2018    Procedure: LAPAROSCOPIC HERNIORRHAPHY INCISIONAL;  Laparoscopic Incisional Hernia Repair with Symbotex Mesh Implant;  Surgeon: Nestor Phoenix MD;  [facility]     LAPAROSCOPIC PANCREATECTOMY, TRANSPLANT AUTO ISLET CELL N/A 12/28/2016    Procedure: LAPAROSCOPIC PANCREATECTOMY, TRANSPLANT AUTO ISLET CELL;  Surgeon: Nestor Phoenix MD;  [facility]     LAPAROTOMY EXPLORATORY N/A 1/31/2023    Procedure: Exploratory Laparotomy, lysis of adhesions, Perforated J-Junostomy Resection, Replace J-Junostomy site;  Surgeon: Elver Bauer MD;  [facility]      LAPAROTOMY, LYSIS ADHESIONS, COMBINED N/A 1/31/2023    Procedure: Dilatation of jejunostomy feeding tube, track with placement of jejunostomy tube with fluoroscopy;  Surgeon: Elver Bauer MD;  [facility]     REMOVE AND REPLACE BREAST IMPLANT PROSTHESIS N/A 12/30/2022    Procedure: Exploratory Laparotomy, lysis of adhesions, small bowel resection. Placement of gastric jejunostomy for enteral feeding.;  Surgeon: Elver Bauer MD;  [facility]     REMOVE GASTROSTOMY TUBE ADULT N/A 1/28/2022    Procedure: REMOVAL, GASTROSTOMY TUBE, ADULT;  Surgeon: Mandeep Park MD;  [facility]     REPLACE GASTROJEJUNOSTOMY TUBE, PERCUTANEOUS N/A 9/7/2021    Procedure: GASTROJEJUNOSTOMY TUBE PLACEMENT, PERCUTANEOUS, WITH GASTROPEXY;  Surgeon: Mandeep Park MD;  [facility]     REPLACE GASTROJEJUNOSTOMY TUBE, PERCUTANEOUS N/A 9/22/2021    Procedure: REPLACEMENT, GASTROJEJUNOSTOMY TUBE, PERCUTANEOUS;  Surgeon: Zackery Montoya MD;  [facility]     REPLACE GASTROJEJUNOSTOMY TUBE, PERCUTANEOUS N/A 11/22/2022    Procedure: REPLACEMENT, GASTROJEJUNOSTOMY TUBE, PERCUTANEOUS;  Surgeon: Mandeep Park MD;  [facility]     REPLACE GASTROJEJUNOSTOMY TUBE, PERCUTANEOUS N/A 12/9/2022    Procedure: REPLACEMENT, GASTROJEJUNOSTOMY TUBE, PERCUTANEOUS with ENDOSCOPIC SUTURING.;  Surgeon: Mandeep Park MD;  [facility]     REPLACE JEJUNOSTOMY TUBE, PERCUTANEOUS N/A 9/10/2021    Procedure: UPPER ENDOSCOPY, REPLACEMENT OF PERCUTANEOUS GASTROJEJUNOSTOMY TUBE, T-TAG GASTROPEXY;  Surgeon: Zackery Montoya MD;  [facility]     REPLACE JEJUNOSTOMY TUBE, PERCUTANEOUS N/A 12/29/2021    Procedure: REPLACEMENT, JEJUNOSTOMY TUBE, PERCUTANEOUS;  Surgeon: Mandeep Park MD;  [facility]     REPLACE JEJUNOSTOMY TUBE, PERCUTANEOUS N/A 5/4/2023    Procedure: REPLACEMENT, JEJUNOSTOMY TUBE, PERCUTANEOUS;  Surgeon: Mandeep Park MD;  [facility]     transphenoidal pituitary resection       [facility]  DELIVERY ONLY       [facility]  DELIVERY ONLY      repeat c section with incidental cystotomy with repair     [facility] EXCIS PITUITARY,TRANSNASAL/SEPTAL      pituitary tumor removed for diabetes insipidus     [facility] TOTAL ABDOM HYSTERECTOMY      w/ bilateral salpingoophorectomy       Allergies   Allergen Reactions     Apple Juice Anaphylaxis     Corticosteroids Other (See Comments)     All oral, IV and injectable steroids are contraindicated (unless in life threatening situations) in Islet Auto transplant recipients. They can cause irreversible loss of islet cell function. Please contact patient's transplant care coordinator ADI Gaffney RN at [phone]/pager [phone] and/or endocrinologist prior to administration.       Depakote [Divalproex Sodium] Other (See Comments)     Chest pain     Zithromax [Azithromycin Dihydrate] Anaphylaxis     Noted in 08 ER     Bromfenac      Other reaction(s): Headache     Codeine      Other reaction(s): Hallucinations     Darvocet [Propoxyphene N-Apap] Itching     Morphine Nausea and Vomiting and Rash     Nalbuphine Hcl Rash     RASH :nubaine     Zosyn [Piperacillin-Tazobactam In Dex] Rash     Possible allergy, did have a diffuse rash that seemed drug related but could have also been related to soap in the hospital.      Bactrim [Sulfamethoxazole W-Trimethoprim] Other (See Comments) and Nausea and Vomiting     Severely low liver function.     Statins Other (See Comments)     Previous liver issues, risks vs benefits felt to not warrant statin.  Discussed Oct 2022 visit     Tape [Adhesive Tape] Blisters     Tramadol      Zofran [Ondansetron] Other (See Comments)     migraine     Flagyl [Metronidazole] Hives and Rash      Social History     Tobacco Use     Smoking status: Former     Packs/day: 0.50     Years: 6.00     Pack years: 3.00     Types: Cigarettes     Start date: 1985     Quit date:  1992     Years since quittin.4     Smokeless tobacco: Not on file     Tobacco comments:     no 2nd hand   Vaping Use     Vaping status: Not on file   Substance Use Topics     Alcohol use: Not Currently     Alcohol/week: 3.0 - 6.0 standard drinks of alcohol     Types: 1 - 2 Glasses of wine, 1 - 2 Cans of beer, 1 - 2 Shots of liquor per week     Comment: none since IGG      Wt Readings from Last 1 Encounters:   23 59.3 kg (130 lb 11.2 oz)        Anesthesia Evaluation   Pt has had prior anesthetic. Type: General.    History of anesthetic complications  - PONV.      ROS/MED HX  ENT/Pulmonary: Comment: 3 pk yr hx, quit     (+) allergic rhinitis, tobacco use, Past use,  (-) asthma and sleep apnea   Neurologic:     (+) migraines,  (-) no seizures and no CVA   Cardiovascular:     (+) Dyslipidemia -----  Echo: Date: Results:    Stress Test: Date: Results:    ECG Reviewed: Date: Results:      Cath: Date: Results:      METS/Exercise Tolerance: 4 - Raking leaves, gardening Comment: Endorses SOB when carrying laundry upstairs. Denies CP. +Fatigue, less stamina 2/2 malnutrition     Hematologic:  - neg hematologic  ROS  (-) history of blood clots and history of blood transfusion   Musculoskeletal: Comment: lumbago, chrondromalacia, stiff shoulder  - neg musculoskeletal ROS     GI/Hepatic: Comment: - Chronic pancreatitis s/p pancreatectomy and auto islet cell transplant   - Asplenic   - Severe gastroparesis, chronic nausea  - Malnutrition, wt loss  - Hiatal hernia  - J/G tube in place    (+) GERD, Symptomatic, hiatal hernia,     Renal/Genitourinary:  - neg Renal ROS     Endo: Comment: - Auto islet cell transplant (2016); post-pancreatectomy enzyme deficiency  - Pancreatectomy related diabetes    (+) thyroid problem, hypothyroidism,     Psychiatric/Substance Use:  - neg psychiatric ROS     Infectious Disease: Comment: - Klebsiella pneumonia bacteremia 2023      Malignancy:  - neg malignancy ROS (+)  Malignancy (pituitary adenoma), History of Neuro.    Other:  - neg other ROS          Physical Exam    Airway        Mallampati: II   TM distance: > 3 FB   Neck ROM: full   Mouth opening: > 3 cm    Respiratory Devices and Support         Dental       (+) Completely normal teeth      Cardiovascular   cardiovascular exam normal       Rhythm and rate: regular     Pulmonary   pulmonary exam normal        breath sounds clear to auscultation           OUTSIDE LABS:  CBC:   Lab Results   Component Value Date    WBC 5.5 06/08/2023    WBC 4.8 06/07/2023    HGB 11.7 06/08/2023    HGB 11.6 (L) 06/07/2023    HCT 36.7 06/08/2023    HCT 36.6 06/07/2023     06/08/2023     06/07/2023     BMP:   Lab Results   Component Value Date     06/08/2023     06/07/2023    POTASSIUM 3.8 06/08/2023    POTASSIUM 3.8 06/07/2023    CHLORIDE 103 06/08/2023    CHLORIDE 102 06/07/2023    CO2 27 06/08/2023    CO2 26 06/07/2023    BUN 10.1 06/08/2023    BUN 4.8 (L) 06/07/2023    CR 0.53 06/08/2023    CR 0.54 06/07/2023     (H) 06/08/2023     (H) 06/08/2023     COAGS:   Lab Results   Component Value Date    PTT 29 01/07/2017    INR 1.15 06/04/2023    FIBR 225 12/28/2016     POC:   Lab Results   Component Value Date    BGM 85 08/24/2018    HCG Negative 12/19/2016     HEPATIC:   Lab Results   Component Value Date    ALBUMIN 3.5 06/08/2023    PROTTOTAL 6.2 (L) 06/08/2023     (H) 06/08/2023    AST 30 06/08/2023     (H) 06/04/2023    ALKPHOS 174 (H) 06/08/2023    BILITOTAL 0.4 06/08/2023     OTHER:   Lab Results   Component Value Date    PH 7.49 (H) 12/28/2016    LACT 1.3 06/04/2023    A1C 5.4 02/17/2022    KASSI 8.6 06/08/2023    PHOS 3.5 06/08/2023    MAG 1.8 06/08/2023    LIPASE 6 (L) 06/04/2023    AMYLASE 45 01/23/2017    TSH 0.55 06/05/2023    T4 1.13 03/23/2018    CRP 90.0 (H) 12/29/2016    SED 10 09/16/2021       Anesthesia Plan    ASA Status:  3      Anesthesia Type: MAC.     - Reason for MAC:  immobility needed, straight local not clinically adequate   Induction: Propofol.   Maintenance: TIVA.   Techniques and Equipment:     - Lines/Monitors: BIS     Consents    Anesthesia Plan(s) and associated risks, benefits, and realistic alternatives discussed. Questions answered and patient/representative(s) expressed understanding.    - Discussed:     - Discussed with:  Patient      - Patient is DNR/DNI Status: Yes         Postoperative Care    Pain management: IV analgesics.   PONV prophylaxis: Ondansetron (or other 5HT-3), Dexamethasone or Solumedrol, Background Propofol Infusion     Comments:                Riley Garibay MD

## 2023-06-09 NOTE — PROVIDER NOTIFICATION
Provider notified (name): Marcela Meade MD   Reason for notification: Plz remove NPO orders. And unhold tube feeds.     Response from provider: No response. No orders changed.

## 2023-06-09 NOTE — ANESTHESIA POSTPROCEDURE EVALUATION
Patient: Dinora Mcghee    Procedure: Procedure(s):  COLONOSCOPY, WITH POLYPECTOMY AND BIOPSY       Anesthesia Type:  MAC    Note:  Disposition: Inpatient   Postop Pain Control: Uneventful            Sign Out: Well controlled pain   PONV: No   Neuro/Psych: Uneventful            Sign Out: Acceptable/Baseline neuro status   Airway/Respiratory: Uneventful            Sign Out: Acceptable/Baseline resp. status   CV/Hemodynamics: Uneventful            Sign Out: Acceptable CV status; No obvious hypovolemia; No obvious fluid overload   Other NRE:    DID A NON-ROUTINE EVENT OCCUR? No           Last vitals:  Vitals Value Taken Time   /69 06/09/23 0900   Temp 36.6  C (97.8  F) 06/09/23 0900   Pulse 67 06/09/23 0900   Resp 17 06/09/23 0900   SpO2 100 % 06/09/23 0915   Vitals shown include unvalidated device data.    Electronically Signed By: TYSON MD  June 9, 2023  9:34 AM

## 2023-06-10 VITALS
RESPIRATION RATE: 16 BRPM | SYSTOLIC BLOOD PRESSURE: 104 MMHG | TEMPERATURE: 98 F | BODY MASS INDEX: 19.87 KG/M2 | DIASTOLIC BLOOD PRESSURE: 61 MMHG | OXYGEN SATURATION: 96 % | WEIGHT: 131.1 LBS | HEART RATE: 82 BPM | HEIGHT: 68 IN

## 2023-06-10 LAB
ALBUMIN SERPL BCG-MCNC: 3.5 G/DL (ref 3.5–5.2)
ALP SERPL-CCNC: 162 U/L (ref 35–104)
ALT SERPL W P-5'-P-CCNC: 99 U/L (ref 10–35)
ANION GAP SERPL CALCULATED.3IONS-SCNC: 8 MMOL/L (ref 7–15)
AST SERPL W P-5'-P-CCNC: 40 U/L (ref 10–35)
BACTERIA BLD CULT: NO GROWTH
BACTERIA BLD CULT: NO GROWTH
BILIRUB SERPL-MCNC: 0.4 MG/DL
BUN SERPL-MCNC: 9.1 MG/DL (ref 6–20)
CALCIUM SERPL-MCNC: 8.4 MG/DL (ref 8.6–10)
CHLORIDE SERPL-SCNC: 103 MMOL/L (ref 98–107)
CREAT SERPL-MCNC: 0.54 MG/DL (ref 0.51–0.95)
DEPRECATED HCO3 PLAS-SCNC: 27 MMOL/L (ref 22–29)
ERYTHROCYTE [DISTWIDTH] IN BLOOD BY AUTOMATED COUNT: 13.8 % (ref 10–15)
GFR SERPL CREATININE-BSD FRML MDRD: >90 ML/MIN/1.73M2
GLUCOSE BLDC GLUCOMTR-MCNC: 123 MG/DL (ref 70–99)
GLUCOSE BLDC GLUCOMTR-MCNC: 152 MG/DL (ref 70–99)
GLUCOSE SERPL-MCNC: 130 MG/DL (ref 70–99)
HCT VFR BLD AUTO: 34.9 % (ref 35–47)
HGB BLD-MCNC: 11.5 G/DL (ref 11.7–15.7)
MAGNESIUM SERPL-MCNC: 2 MG/DL (ref 1.7–2.3)
MCH RBC QN AUTO: 30.8 PG (ref 26.5–33)
MCHC RBC AUTO-ENTMCNC: 33 G/DL (ref 31.5–36.5)
MCV RBC AUTO: 94 FL (ref 78–100)
PHOSPHATE SERPL-MCNC: 3.3 MG/DL (ref 2.5–4.5)
PLATELET # BLD AUTO: 356 10E3/UL (ref 150–450)
POTASSIUM SERPL-SCNC: 4.3 MMOL/L (ref 3.4–5.3)
PROT SERPL-MCNC: 5.9 G/DL (ref 6.4–8.3)
RBC # BLD AUTO: 3.73 10E6/UL (ref 3.8–5.2)
SODIUM SERPL-SCNC: 138 MMOL/L (ref 136–145)
WBC # BLD AUTO: 8.1 10E3/UL (ref 4–11)

## 2023-06-10 PROCEDURE — C9113 INJ PANTOPRAZOLE SODIUM, VIA: HCPCS | Performed by: PHYSICIAN ASSISTANT

## 2023-06-10 PROCEDURE — 250N000013 HC RX MED GY IP 250 OP 250 PS 637: Performed by: STUDENT IN AN ORGANIZED HEALTH CARE EDUCATION/TRAINING PROGRAM

## 2023-06-10 PROCEDURE — 250N000013 HC RX MED GY IP 250 OP 250 PS 637: Performed by: PHYSICIAN ASSISTANT

## 2023-06-10 PROCEDURE — 250N000011 HC RX IP 250 OP 636: Performed by: INTERNAL MEDICINE

## 2023-06-10 PROCEDURE — 250N000011 HC RX IP 250 OP 636: Performed by: STUDENT IN AN ORGANIZED HEALTH CARE EDUCATION/TRAINING PROGRAM

## 2023-06-10 PROCEDURE — 99239 HOSP IP/OBS DSCHRG MGMT >30: CPT | Performed by: STUDENT IN AN ORGANIZED HEALTH CARE EDUCATION/TRAINING PROGRAM

## 2023-06-10 PROCEDURE — 258N000003 HC RX IP 258 OP 636: Performed by: STUDENT IN AN ORGANIZED HEALTH CARE EDUCATION/TRAINING PROGRAM

## 2023-06-10 PROCEDURE — 250N000011 HC RX IP 250 OP 636: Performed by: PHYSICIAN ASSISTANT

## 2023-06-10 PROCEDURE — 83735 ASSAY OF MAGNESIUM: CPT | Performed by: INTERNAL MEDICINE

## 2023-06-10 PROCEDURE — 85014 HEMATOCRIT: CPT

## 2023-06-10 PROCEDURE — 36591 DRAW BLOOD OFF VENOUS DEVICE: CPT

## 2023-06-10 PROCEDURE — 84100 ASSAY OF PHOSPHORUS: CPT | Performed by: INTERNAL MEDICINE

## 2023-06-10 PROCEDURE — 80053 COMPREHEN METABOLIC PANEL: CPT

## 2023-06-10 RX ORDER — HEPARIN SODIUM (PORCINE) LOCK FLUSH IV SOLN 100 UNIT/ML 100 UNIT/ML
5-10 SOLUTION INTRAVENOUS
Status: DISCONTINUED | OUTPATIENT
Start: 2023-06-10 | End: 2023-06-10 | Stop reason: HOSPADM

## 2023-06-10 RX ORDER — HEPARIN SODIUM,PORCINE 10 UNIT/ML
5-10 VIAL (ML) INTRAVENOUS
Status: DISCONTINUED | OUTPATIENT
Start: 2023-06-10 | End: 2023-06-10 | Stop reason: HOSPADM

## 2023-06-10 RX ORDER — HEPARIN SODIUM,PORCINE 10 UNIT/ML
5-10 VIAL (ML) INTRAVENOUS EVERY 24 HOURS
Status: DISCONTINUED | OUTPATIENT
Start: 2023-06-10 | End: 2023-06-10 | Stop reason: HOSPADM

## 2023-06-10 RX ADMIN — ERTAPENEM SODIUM 1 G: 1 INJECTION, POWDER, LYOPHILIZED, FOR SOLUTION INTRAMUSCULAR; INTRAVENOUS at 13:31

## 2023-06-10 RX ADMIN — PROMETHAZINE HYDROCHLORIDE 25 MG: 25 INJECTION INTRAMUSCULAR; INTRAVENOUS at 12:34

## 2023-06-10 RX ADMIN — THIAMINE HCL TAB 100 MG 100 MG: 100 TAB at 08:10

## 2023-06-10 RX ADMIN — OXYCODONE HYDROCHLORIDE 5 MG: 5 SOLUTION ORAL at 04:39

## 2023-06-10 RX ADMIN — LEVOTHYROXINE SODIUM 137 MCG: 0.14 TABLET ORAL at 08:10

## 2023-06-10 RX ADMIN — CETIRIZINE HYDROCHLORIDE 10 MG: 10 TABLET, FILM COATED ORAL at 08:10

## 2023-06-10 RX ADMIN — SENNOSIDES 5 ML: 8.8 LIQUID ORAL at 08:10

## 2023-06-10 RX ADMIN — Medication 15 ML: at 08:10

## 2023-06-10 RX ADMIN — PANCRELIPASE 3 CAPSULE: 24000; 76000; 120000 CAPSULE, DELAYED RELEASE PELLETS ORAL at 08:25

## 2023-06-10 RX ADMIN — METHOCARBAMOL 750 MG: 750 TABLET ORAL at 08:09

## 2023-06-10 RX ADMIN — PROMETHAZINE HYDROCHLORIDE 25 MG: 25 INJECTION INTRAMUSCULAR; INTRAVENOUS at 04:39

## 2023-06-10 RX ADMIN — ACETAMINOPHEN 650 MG: 325 TABLET, FILM COATED ORAL at 08:09

## 2023-06-10 RX ADMIN — PANTOPRAZOLE SODIUM 40 MG: 40 INJECTION, POWDER, FOR SOLUTION INTRAVENOUS at 08:10

## 2023-06-10 RX ADMIN — Medication 5 ML: at 14:28

## 2023-06-10 RX ADMIN — METHOCARBAMOL 750 MG: 750 TABLET ORAL at 12:29

## 2023-06-10 RX ADMIN — ACETAMINOPHEN 650 MG: 325 TABLET, FILM COATED ORAL at 12:29

## 2023-06-10 ASSESSMENT — ACTIVITIES OF DAILY LIVING (ADL)
ADLS_ACUITY_SCORE: 24

## 2023-06-10 NOTE — PLAN OF CARE
"Cares 0700 to 1430    /61 (BP Location: Left arm)   Pulse 82   Temp 98  F (36.7  C) (Oral)   Resp 16   Ht 1.727 m (5' 8\")   Wt 59.5 kg (131 lb 1.6 oz)   SpO2 96%   BMI 19.93 kg/m      VSS on room air. Alert & oriented x4. Abdominal pain, managed with tylenol & robaxin. PO meds crushed & given via G tube. Liquid meds via J tube. Pt able to tolerate tube feeds @goal rate 40 ml/hr. G & J tube clamped for discharge. Port cath hep locked. Pt admitted with port already accessed, pt did not want port de-accessed for discharge as pt is receiving IV abx tomorrow at home. Provider aware. Pt had 1 episode of emesis, phenergen IV given with relief. Pt knowledgeable about her meds & infusion therapies. Pt administers meds & tube feeds independently at home. Pt stated she has supplies at home and was pleased about discharging.     Discharged to: Home  Transportation:  providing ride.   Time: 1435  Prescriptions: Returned pt's own meds back to pt. Pt and  notified to  3 meds from Discharge pharmacy.   Belongings: Packed by pt.   PIV/Access: Port not de-accessed per pt request. Provider aware.   Care Plan and Education discontinued: Yes  Paperwork: Discussed and given to pt.     Goal Outcome Evaluation:    Plan of Care Reviewed With: patient  Overall Patient Progress: improvingOverall Patient Progress: improving  Outcome Evaluation: Manage nausea and increase tube feeds per pt tolerance.      "

## 2023-06-10 NOTE — PROGRESS NOTES
"RN assumed cares at 0897-0023 hrs    /54 (BP Location: Left arm)   Pulse 76   Temp 98  F (36.7  C) (Oral)   Resp 16   Ht 1.727 m (5' 8\")   Wt 59.5 kg (131 lb 1.6 oz)   SpO2 95%   BMI 19.93 kg/m       Vitals: VSS on room air.  Pain: Verbalized pain 8/10 pain located in abdomen. Relief from PRN oxy x1  Neuro: A&O x4; calm and cooperative.   Cardiac: WDL.   Peripheral neurovascular: WDL.  Respiratory: Unlabored breaths on RA.   GI/: Adequate output. x loose BM reported during shift. C/o nausea relieved by promethazine.   IV/Drains: Port infusing LR at 100mL/hr, J tube for liquid meds and tube feeding. G tube for crushed meds.  Skin: No new skin concerns. Rash from tape on L midarm.   Activity: up independently to the bathroom  Nutrition: On tube feeding running 40mL/hr starting at 0500 hrs.  Labs: on RN managed K/ Mg/ P replacement protocol. BG- 152      Events; Q2hr rounding completed. Call light within reach and is able to make needs known. Tube feeds increased to goals. @ 40 mls/hr     Plan: Will continue with plan of care      "

## 2023-06-10 NOTE — PROGRESS NOTES
Care Management Discharge Note    Discharge Date: 06/10/2023  Discharge Disposition: Home with family   Discharge Services: Home Care, Home Infusion  Discharge DME: IV Abx; TF, Gtube and Jtube supplies  Discharge Transportation: family or friend will provide  Private pay costs discussed: Not applicable  Does the patient's insurance plan have a 3 day qualifying hospital stay waiver?  No  PAS Confirmation Code:  n/a  Patient/family educated on Medicare website which has current facility and service quality ratings:  n/a  Education Provided on the Discharge Plan:  yes  Persons Notified of Discharge Plans: Pt; G8 provider; bedside RN  Patient/Family in Agreement with the Plan: yes    Handoff Referral Completed: Yes [facility] - [facility]  [facility] Liaison, Makayla Ascencio  [address] [place] [address]  Phone: [phone]  Fax: [phone]  - Resume TF (Vital 1.5) & IVF (TPN holiday)  ** Daily IV Abx - SOC 6/11  **MD to clarify which IVF for home use (LR vs NS)      [facility]  [address] [place] [address] [place] [address]   Phone: [phone]  Fax: [phone]   - Hand-off done; OLIVA BURLESON (discharge) orders; Friday, verbal resumption accepted      Additional Information:  Pt to discharge this afternoon, after IV Abx admin; SOC for [facility] to be Sunday. RNCC confirmed SOC with [facility] @ 0930, which they will support education for prescribed regimen(s), in admin and use of home medical equipment. Pt stated her spouse will be available for transportation, after 1400. Pt also requested the need for TF formula for the ride home, noting she had no reserves at home. RNCC left VM with [facility] nurse line, requesting follow-up and bedside visit/delivery, as appropriate, to fulfill pt.'s request. [facility] requested additional clarification of diet order, discussed with pharmacy, page sent to MD; item corrected. RNCC to conclude following pt upon safe departure from floor and facility.           Edi Barger RN BSN  5B RNCC  Phone: [phone]  Pager: [phone]    For Weekend & Holiday on call RN Care Coordinator:  (Tasks: Home care, home infusion, medical equipment, transportation, IMM & [facility]ENDERSON forms, etc.)  [place] (0800 - 1630) Saturday and Sunday    Units: 4A, 4C, 4E, 5A and 5B: [phone]    Units: 6A, 6B, 6C, 6D: [phone]    Units: 7A, 7B, 7C, 7D, and 5C: [phone]    [facility] ([phone]) Saturday and Sunday    Units: 5 Ortho, 8A, 10 ICU, & Pediatric Units: [phone]

## 2023-06-10 NOTE — DISCHARGE SUMMARY
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 The following   labs/tests are recommended: blood cultures.    Appointments on [place] and/or [facility] (with [facility] or [facility]   provider or service). Call [phone] if you haven't heard regarding   these appointments within 7 days of discharge.             Unresulted Labs Ordered in the Past 30 Days of this Admission     Date and Time Order Name Status Description    6/9/2023  8:46 AM Surgical Pathology Exam In process     6/6/2023 12:02 AM Blood Culture Hand, Right Preliminary     6/6/2023 12:02 AM Blood Culture Hand, Left Preliminary       These results will be followed up by GI and Hospitalist Team    Discharge Disposition   Discharged to home  Condition at discharge: Stable    Hospital Course    Dinora Mcghee is a 58 year old woman with history including gallstone s/p cholecystectomy (1996), multiple ERCP c/b recurrent pancreatitis, gastroparesis c/b chronic nausea and vomiting, chronic malnutrition on TF and TPN presented to [facility] ED on 6/4/23  with two days of fever and body ache found to have klebsiella bacteremia.     Sepsis 2/2 Klebsiella pneumonia bacteremia (2 of 2 Bcx 6/4/23)  Elevated Lactate - resolved  Body Aches - resolved  Asplenia - resolved  Onset of fevers (highest 101.7F day of adm), widespread body aches (head to toes, no meningeal signs), rising LFTs, poor appetite, acute on chronic abdominal pain and worsening of baseline diarrhea. Lactate 2.7-->1.3 with fluids. WBC 8.8 with no left shift. Procal 6.33. UA no e/o infection. CXR no infiltrate. CTA C/A/P at [facility] 6/3/23 with no acute findings. Concern for possible CLABSI vs cholangitis. Blood cultures from admission with Klebsiella, highest concern for GI source, possibly cholangitis. GI consulted and reviewed imaging and did not feel ERCP was indicated. LFTs and symptoms improved on antibiotics.  - Continue ertapenem q24h to complete 14 days (end 6/21)  - Follow up in transplant ID clinic on 6/23 with plan for repeat surveillance cultures  - Hold off on restarting TPN until after confirmation of clearance of infection.     Transaminitis - improving  Noted rising LFTs as of ~3 weeks ago possibly related to TPN. Lipase and total bili WNL. . LFTs are higher than documented at [facility] in [place] day prior to admission. Hep A (IgM neg), B (pdg), C (IgG neg). INR normal, suggesting intact synthetic function. MRCP to r/o cholangitis, negative; no ERCP per GI. Improved over course of hospital stay with treatment of infection (as noted above).      Acute on Chronic Abdominal Pain  Related to above complicated GI history. Seeing [facility] specialists and being evaluated for spinal cord stimulator. Patient reporting typically has abdominal pain related to gastroparesis, but has been significantly worse particularly on the left side in addition to wide spread body aches from her head to her toes. COVID/Flu/RSV negative on 6/3/23. Overall feels significantly improved with treatment of above  infection.  - Continue PTA Robaxin QID prn and Flexeril at bedtime prn, continue PTA Elavil at bedtime.  - acetaminophen maximum 2g daily with transaminase elevation  - GI completed colonoscopy 6/9, biopsies pending at discharge     Gastroparesis c/b Chronic Nausea s/p G-tube and J-tube  Chronic Constipation  Severe Malnutrition  Hypokalemia  Hypomagnesemia  Hypophosphatemia  - Regular diet (eating limited amounts of food at home)  - Hold off on TPN on discharge until follow up in transplant ID clinic to confirm clearance of infection  - Continue tube feeds at 40ml/h at discharge. Pt to follow up with her outpatient dietician.     Acute on Chronic Diarrhea  Frequently with loose stools but reports significantly worse past 3 days (no melena or hematochezia). Is having acute on chronic abdominal pain. Had colonoscopy on 6/9 with GI, biopsies pending.  - GI to follow up on biopsies outpatient.     Chest Pressure  Noted alongside body aches, resolved with dilaudid. EKG without evidence of ST segment elevation or depression. Trop 7, <6.     History of pancreatectomy related DM  S/p islet cell transplant 12/28/2016  Typically there is insulin in her TPN. Last A1C 5.5 ~6 months ago.  - Continue creon with meals  - Continue insulin with TPN     Incidental Lung Nodule  3 mm right upper lobe nodule on series 3 image 103 noted on CTA C/A/P 6/3/23 done at [place].   - Will need follow up imaging     GERD  Candida esophagitis  Finished fluconazole 400 mg suspension daily per G-tube for 21 day course.     Hypothyroidism   - Continue PTA levothyroxine     H/o Migraines  Uses Botox injection and Zomig at home.   - Resume PTA Zomig     Seasonal Allergies   - Continue Zyrtec and Singulair      Consultations This Hospital Stay   PHARMACY TO DOSE VANCO  INFECTIOUS DISEASE TRANSPLANT SOT ADULT IP CONSULT  GI LUMINAL ADULT IP CONSULT  CARE MANAGEMENT / SOCIAL WORK IP CONSULT  NUTRITION SERVICES ADULT IP CONSULT  NURSING TO CONSULT FOR  VASCULAR ACCESS CARE IP CONSULT  CARE MANAGEMENT / SOCIAL WORK IP CONSULT  OPAT PHARMACY IP CONSULT  PHARMACY IP CONSULT  PHARMACY IP CONSULT    Code Status   No CPR- Pre-arrest intubation OK    Time Spent on this Encounter   I, Raeann Meza MD, personally saw the patient today and spent greater than 30 minutes discharging this patient.       Raeann Meza MD  [facility], located within [facility] [address] [address]  Phone: [phone]  ______________________________________________________________________    Physical Exam   Vital Signs: Temp: 98  F (36.7  C) Temp src: Oral BP: 104/61 Pulse: 82   Resp: 16 SpO2: 96 % O2 Device: None (Room air)    Weight: 131 lbs 1.6 oz    General Appearance: NAD. Lying comfortably in bed.  Respiratory: CTAB. No increased WOB.  Cardiovascular: RRR. No m/r/g  GI: Soft. Non-tender. Non-distended.  Skin: No rash.  Other: AOx4. Moving all extremities. Normal affect.       Primary Care Physician   Juan Jose Gomez    Discharge Orders      OPAT enrollment and ID Clinic Referral      Reason for your hospital stay    You were hospitalized for worsening pain and found to have an infection in your bloodstream which is being treated with antibiotics. You also had a colonoscopy and GI will follow up regarding the cultures.    - Continue IV ertapenem (antibiotic) daily until 6/21. [facility] will show you how to administer this tomorrow (6/11). You have follow up with ID clinic on 6/23.  - Continue tube feeds on discharge. Follow up with your dietician regarding rate and if any changes need to be made to the formula.  - Hold off on restarting tube feeds until after ID clinic follow up to ensure eradication of the infection.     Activity    Your activity upon discharge: activity as tolerated     Adult [facility] Follow-up and recommended labs and tests    Follow up with ID clinic on 6/23 for hospital follow- up. The following labs/tests are recommended: blood  cultures.    Appointments on [place] and/or [facility] (with [facility] or [facility] provider or service). Call [phone] if you haven't heard regarding these appointments within 7 days of discharge.     Resume Home Care Services     Diet    Follow this diet upon discharge:      Adult Formula Drip Feeding: Continuous Vital 1.5; Jejunostomy; Goal Rate: 40; mL/hr; start at 20 mL/hr, advance to 40 mL/hr incrementally as tolerated      Prosource TF20 1 packet twice daily      Regular Diet Adult       Significant Results and Procedures   Most Recent 3 CBC's:Recent Labs   Lab Test 06/10/23  0706 06/09/23 0445 06/08/23  0706   WBC 8.1 6.5 5.5   HGB 11.5* 11.0* 11.7   MCV 94 94 95    302 277     Most Recent 3 BMP's:Recent Labs   Lab Test 06/10/23  0706 06/10/23  0705 06/10/23  0217 06/09/23  0551 06/09/23  0445 06/08/23  0743 06/08/23  0706     --   --   --  139  --  139   POTASSIUM 4.3  --   --   --  4.1  --  3.8   CHLORIDE 103  --   --   --  103  --  103   CO2 27  --   --   --  26  --  27   BUN 9.1  --   --   --  11.7  --  10.1   CR 0.54  --   --   --  0.51  --  0.53   ANIONGAP 8  --   --   --  10  --  9   KASSI 8.4*  --   --   --  8.8  --  8.6   * 123* 152*   < > 100*   < > 124*    < > = values in this interval not displayed.     Most Recent 2 LFT's:Recent Labs   Lab Test 06/10/23  0706 06/09/23 0445   AST 40* 31   ALT 99* 101*   ALKPHOS 162* 154*   BILITOTAL 0.4 0.4     Most Recent 3 INR's:Recent Labs   Lab Test 06/04/23  1348 05/10/23  0719 06/10/22  0855   INR 1.15 1.04 1.03   ,   Results for orders placed or performed during the hospital encounter of 06/04/23   XR Chest Port 1 View    Narrative    EXAM: XR CHEST PORT 1 VIEW  6/4/2023 5:33 PM     HISTORY:  sepsis, assessing for source       COMPARISON:  9/23/2021    FINDINGS:   AP portable semiupright view of the chest. Right chest wall cydney cath  distal tip in the high SVC. Trachea is midline. Cardiomediastinal  silhouette and pulmonary  vasculature are within normal limits. No  focal airspace opacity, pleural effusion or appreciable pneumothorax.    No acute osseous abnormality. Visualized upper abdomen is  unremarkable.        Impression    IMPRESSION:   No acute airspace disease.    I have personally reviewed the examination and initial interpretation  and I agree with the findings.    BOB RIVERA MD         SYSTEM ID:  [id number]   US Abdomen Limited    Addendum: 6/7/2023    Previous final report notes visualized portions of the pancreatic head  and body are within normal limits. Erroneous finding as the patient  has previously undergone partial pancreatectomy, echogenic material  visualized is likely fat within the lesser sac.    I have personally reviewed the examination and initial interpretation  and I agree with the findings.    CARL MAR MD         SYSTEM ID:  [id number]      Narrative    EXAMINATION: Limited Abdominal Ultrasound, 6/5/2023 10:32 AM     COMPARISON: 5/9/2023, 1/12/2023    HISTORY: Focus to hepatobiliary/RUQ; sepsis      FINDINGS:   Fluid: Equivocal trace perihepatic ascites.  No pleural effusion.    Liver: The liver demonstrates diffusely echogenic and heterogeneous  parenchyma, and is enlarged measuring 18.9 cm in craniocaudal  dimension. There is no focal mass. Patent main portal vein with flow  directed towards liver.    Gallbladder: Cholecystectomy. Normal caliber and intrahepatic biliary  system.    Bile Ducts: The common bile duct measures approximately 2 mm in  diameter, although difficult to visualize with ultrasound.    Pancreas: Visualized portions of the head and body of the pancreas are  unremarkable.     Kidney: The right kidney measures 12.2 cm long. There is no  hydronephrosis, mass lesion or obstructing calculus. Right kidney  cortex is diffusely prominent and echogenic.      Impression    IMPRESSION:   1.  Sonographic evidence of intrinsic hepatic parenchymal disease  including hepatic steatosis.  No  focal liver lesion. Equivocal trace  perihepatic ascites.     2.  Echogenic right kidney compatible with medical renal disease.  3. Cholecystectomy.  Common bile duct measures approximately 2 mm in  diameter, although difficult to visualize with ultrasound.       I have personally reviewed the examination and initial interpretation  and I agree with the findings.    CATALINA PAYNE MD         SYSTEM ID:  [id number]   MR Abdomen MRCP w/o & w Contrast    Narrative    MRI ABDOMEN    CLINICAL HISTORY:  History of biliary obstruction status post  cholecystectomy, ERCP/stent, admitted with sepsis, exclude cholangitis    TECHNIQUE:  Images were acquired with and without intravenous contrast  through the abdomen. The following MR images were acquired: TrueFISP,  multiplanar T2 weighted, axial T1 in/out of phase, axial fat-saturated  T1, diffusion-weighted. Multiplanar T1-weighted images with fat  saturation were before contrast administration and at multiple time  points following the administration of intravenous contrast. Contrast  dose: 6 mL gadavist    FINDINGS:    Comparison study: CT 5/19/2023, 1/12/2023    Liver: Heterogeneous enhancement of the liver on the arterial phase  with mild periportal edema. No focal hepatic mass. Homogenous  enhancement of the liver parenchyma on the portal venous and delayed  phases. No hepatic steatosis or evidence of iron overload. The common  bile duct and intrahepatic biliary system do not appear dilated.  Somewhat narrowed appearance of the common bile duct near the  bifurcation without obvious obstructing mass. Postcontrast, the  biliary tree does not appear significantly hyperemic and no definite  thickening of the common bile duct is appreciated.     Gallbladder: Cholecystectomy.    Spleen: Splenectomy.    Kidneys: No focal mass. No hydronephrosis.    Adrenal glands: Unremarkable.    Pancreas: Pancreatectomy.    Bowel: Percutaneous gastrostomy and jejunostomy tubes. No  obstruction.    Lymph nodes: Unremarkable.    Blood vessels: The aorta and portal vein are patent.    Lung bases: Small bilateral pleural effusions.    Bones and soft tissues: Hemangioma suspected in the L2 vertebral body  on the right, series 4 image 17, stable from prior CT.    Mesentery and abdominal wall: Minor body wall edema.    Ascites: Small volume ascites.      Impression    IMPRESSION:  1. The common bile duct appears somewhat narrowed near the  bifurcation, however there is no upstream intrahepatic biliary  dilatation to suggest obstruction. No definite findings to suggest  acute cholangitis.  2. Heterogeneous enhancement of the liver on arterial phase sequence  with associated periportal edema. Findings are nonspecific but can be  seen with acute hepatitis. Alternatively, periportal edema may be due  to overall third spacing of fluid with ascites and some body wall  edema also noted. Correlation with liver function tests.  3. Small volume ascites.    I have personally reviewed the examination and initial interpretation  and I agree with the findings.    MACRINA CORRIGAN MD         SYSTEM ID:  [id number]     *Note: Due to a large number of results and/or encounters for the requested time period, some results have not been displayed. A complete set of results can be found in Results Review.       Discharge Medications   Current Discharge Medication List      START taking these medications    Details   ertapenem (INVANZ) 1 GM vial Inject 1 g into the vein every 24 hours for 11 days  Qty: 110 mL, Refills: 0    Associated Diagnoses: Cholangitis      Multiple Vitamins-Minerals (MULTIVITAMINS W/MINERALS) liquid 15 mLs by Per Feeding Tube route daily for 30 days  Qty: 450 mL, Refills: 0    Associated Diagnoses: Malnutrition, unspecified type (H)      promethazine 25 mg Inject 25 mg into the vein daily as needed for nausea         CONTINUE these medications which have CHANGED    Details   amitriptyline (ELAVIL)  10 MG tablet Take 4 tablets (40 mg) by mouth At Bedtime  Qty: 30 tablet, Refills: 0    Associated Diagnoses: Nausea and vomiting, unspecified vomiting type      cyclobenzaprine (FLEXERIL) 10 MG tablet 1 tablet (10 mg) by Per G Tube route daily    Associated Diagnoses: Diabetes insipidus (H); Gastroparesis; Pancreatic insufficiency; Epigastric pain; Encounter for gastrojejunal (GJ) tube placement      diphenhydrAMINE (BENADRYL ALLERGY) 25 MG capsule Take 1 capsule (25 mg) by mouth every 6 hours as needed for itching or allergies 10 mL per G-tube    Associated Diagnoses: Pancreatic insufficiency; Itching; Post-pancreatectomy diabetes (H); History of pancreatectomy      ibuprofen (ADVIL/MOTRIN) 400 MG tablet 1 tablet (400 mg) by Per G Tube route every 6 hours as needed for moderate pain    Associated Diagnoses: Acute post-operative pain      methocarbamol (ROBAXIN) 750 MG tablet Take 1 tablet (750 mg) by mouth 4 times daily as needed for muscle spasms      prucalopride succinate (MOTEGRITY) 2 MG tablet Take 1 tablet (2 mg) by mouth daily Per G-tube    Associated Diagnoses: Gastroparesis; Slow transit constipation; Chronic idiopathic constipation; Constipation, unspecified constipation type; Irritable bowel syndrome, unspecified type      ZOLMitriptan (ZOMIG-ZMT) 5 MG ODT Take 1 tablet (5 mg) by mouth at onset of headache for migraine Dissolve tablet in the mouth. May repeat in 2 hours. Max 2 tablets/24 hours.    Associated Diagnoses: Migraine with aura and without status migrainosus, not intractable         CONTINUE these medications which have NOT CHANGED    Details   acetaminophen (TYLENOL) 325 MG/10.15ML solution 20.3 mLs (650 mg) by Per J Tube route every 6 hours as needed for mild pain or fever  Qty: 473 mL, Refills: 4    Associated Diagnoses: Gastroparesis      blood glucose (PAT CONTOUR NEXT) test strip Use to test blood sugar 6 times daily or as directed.  Qty: 2 Box, Refills: 11    Associated Diagnoses:  "Post-pancreatectomy diabetes (H)      blood glucose monitoring (Dragon InnovationET) lancets Use to test blood sugar 6-8 times daily or as directed.  Qty: 100 each, Refills: 11    Associated Diagnoses: Acute on chronic pancreatitis (H)      cetirizine (ZYRTEC) 10 MG tablet 1 tablet (10 mg) by Per G Tube route daily  Qty: 30 tablet, Refills: 11    Associated Diagnoses: Feeding intolerance      Continuous Blood Gluc Sensor (FREESTYLE LISA 3 SENSOR) MISC 1 each every 14 days  Qty: 2 each, Refills: 11    Associated Diagnoses: Type 1 diabetes mellitus with hypoglycemia and without coma (H)      famotidine (PEPCID) 40 MG/5ML suspension 2.5 mLs (20 mg) by Per J Tube route daily  Qty: 50 mL, Refills: 4    Associated Diagnoses: Gastroparesis      glucagon 1 MG kit Give 0.1 to 0.15mg( 10-15 units on Insulin sryinge) subcutaneous  every 15 minutes PRN for hypoglycemia. Remix new kit q24hr. Needs up to 3 kit/week.  Qty: 10 each, Refills: 3    Associated Diagnoses: Hypoglycemia      glucose 40 % GEL gel Take 15-30 g by mouth every 15 minutes as needed for low blood sugar  Qty: 3 Tube, Refills: 2    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      insulin syringe-needle U-100 (30G X 1/2\" 0.3 ML) 30G X 1/2\" 0.3 ML miscellaneous Use 3 syringes daily or as directed.  Qty: 50 each, Refills: 1    Associated Diagnoses: Hypoglycemia      levothyroxine (SYNTHROID/LEVOTHROID) 137 MCG tablet 137 mcg by Per G Tube route daily      montelukast (SINGULAIR) 10 MG tablet 10 mg by Per G Tube route At Bedtime      Nutritional Supplements (BOOST HIGH PROTEIN) LIQD After above baseline labs are drawn, give: 6 mL/kg to maximum of 360 mL; the beverage is to be consumed within 5 minutes.  Refills: 0    Comments: If on pancreatic enzymes, patient may take home enzymes with Boost beverage.      Patients may take long acting insulin (Levemir and Lantus).      Patient should NOT cover Boost with Novolog, Humalog, Apidra, or regular " insulin.  Associated Diagnoses: Pancreatic insufficiency; Post-pancreatectomy diabetes (H); Malabsorption      pantoprazole (PROTONIX) 40 mg IV push injection Inject 40 mg into the vein daily (with breakfast)  Qty: 30 each, Refills: 11    Comments: Patient gets it from her home infusion  Associated Diagnoses: Gastroparesis      promethazine-phenylephrine (PROMETHAZINE VC) 6.25-5 MG/5ML syrup 20 mLs (25 mg) by Per Feeding Tube route nightly as needed for nausea - Per Feeding Tube  Qty: 473 mL, Refills: 3    Associated Diagnoses: Gastrostomy tube obstruction (H); Pancreatic insufficiency; Epigastric pain; Gastroparesis      scopolamine (TRANSDERM) 1 MG/3DAYS 72 hr patch Place 1 patch onto the skin every 72 hours  Qty: 10 patch, Refills: 11    Associated Diagnoses: Slow transit constipation      sennosides (SENOKOT) 8.8 MG/5ML syrup 5 mLs by Per J Tube route 2 times daily  Qty: 236 mL, Refills: 11    Associated Diagnoses: Gastroparesis      sodium chloride 0.9% SOLN BOLUS Inject 1,000 mLs into the vein daily as needed for other (dehydration)  Qty: 1000 mL, Refills: 0      thiamine (B-1) 100 MG tablet 1 tablet (100 mg) by Per J Tube route daily  Qty: 30 tablet, Refills: 11    Associated Diagnoses: Feeding intolerance      UNABLE TO FIND Medical cannabis. Take 1 lozenge 4 MCT to 1 THC by mouth daily for pain/Nausea      zinc oxide - white petrolatum (CRITIC-AID THICK MOIST BARRIER) 20-51% PSTE topical paste Apply 71 g topically every hour as needed for rash (Under J tube bumper when needed for skin protection)  Qty: 170 g, Refills: 0    Associated Diagnoses: Epigastric pain      amylase-lipase-protease (CREON) 43696-24527 units CPEP per EC capsule Take 3-4 capsules by mouth 3 times daily (with meals) With oral meals  Qty: 150 capsule, Refills: 11    Associated Diagnoses: Pancreatic insufficiency      estradiol (VAGIFEM) 10 MCG TABS vaginal tablet INSERT 1 TABLET(10 MCG) VAGINALLY 2 TIMES A WEEK  Qty: 24 tablet, Refills:  2    Comments: The patient requests that this prescription be held on file for filling in the near future.  Associated Diagnoses: Atrophic vaginitis      parenteral nutrition - PTA/DISCHARGE ORDER The TPN formula will print on the After Visit Summary Report.  Qty: 1 each, Refills: 0    Associated Diagnoses: Feeding intolerance      Sharps Container (BD SHARPS ) MISC 1 Container as needed  Qty: 1 each, Refills: 1    Associated Diagnoses: Post-pancreatectomy diabetes (H)      STATIN NOT PRESCRIBED (INTENTIONAL) Previous liver issues, risks vs benefits felt to not warrant statin.    Discussed Oct 2022 visit           Allergies   Allergies   Allergen Reactions     Apple Juice Anaphylaxis     Corticosteroids Other (See Comments)     All oral, IV and injectable steroids are contraindicated (unless in life threatening situations) in [facility]let Auto transplant recipients. They can cause irreversible loss of islet cell function. Please contact patient's transplant care coordinator ADI Gaffney RN at [phone]/pager [phone] and/or endocrinologist prior to administration.       Depakote [Divalproex Sodium] Other (See Comments)     Chest pain     Zithromax [Azithromycin Dihydrate] Anaphylaxis     Noted in 4/7/08 ER     Bromfenac      Other reaction(s): Headache     Codeine      Other reaction(s): Hallucinations     Darvocet [Propoxyphene N-Apap] Itching     Morphine Nausea and Vomiting and Rash     Nalbuphine Hcl Rash     RASH :nubaine     Zosyn [Piperacillin-Tazobactam In Dex] Rash     Possible allergy, did have a diffuse rash that seemed drug related but could have also been related to soap in the hospital.      Bactrim [Sulfamethoxazole W-Trimethoprim] Other (See Comments) and Nausea and Vomiting     Severely low liver function.     Statins Other (See Comments)     Previous liver issues, risks vs benefits felt to not warrant statin.  Discussed Oct 2022 visit     Tape [Adhesive Tape] Blisters     Tramadol       Zofran [Ondansetron] Other (See Comments)     migraine     Flagyl [Metronidazole] Hives and Rash

## 2023-06-10 NOTE — PLAN OF CARE
RN assumed cares at [phone]     Vitals: VSS on room air.  Pain: 8/10 pain located in abdomen. Relief from PRN oxy.  Neuro: A&Ox4; calm and cooperative.   Cardiac: WDL.   Peripheral neurovascular: WDL.  Respiratory: No respiratory distress noted.  GI/: Continent of bladder. 0x loose BM reported during shift. C/o nausea.   IV/Drains: Port running LR at 100mL/hr. J tube for liquid meds and tube feeding. G tube for crushed meds.  Skin: No new concerns. Rash from tape on L midarm.   Activity: up independently.  Nutrition: On tube feeding running 30mL/hr starting at 2100.  Labs: on RN managed K+, Mg, and Phos replacement protocol. Recheck in AM.     Events: No acute events during shift. Q2hr rounding completed. Call light within reach and is able to make needs known.      Plan: Increase tube feeding to pt's goal of 40mL/hr in AM as tolerated by pt.      Goal Outcome Evaluation:      Plan of Care Reviewed With: patient    Overall Patient Progress: improvingOverall Patient Progress: improving    Outcome Evaluation: managing pain with PRN pain meds; increase tube feeds per pt tolerance

## 2023-06-11 LAB
BACTERIA BLD CULT: NO GROWTH
BACTERIA BLD CULT: NO GROWTH

## 2023-06-12 LAB
PATH REPORT.COMMENTS IMP SPEC: NORMAL
PATH REPORT.FINAL DX SPEC: NORMAL
PATH REPORT.GROSS SPEC: NORMAL
PATH REPORT.MICROSCOPIC SPEC OTHER STN: NORMAL
PATH REPORT.RELEVANT HX SPEC: NORMAL
PHOTO IMAGE: NORMAL

## 2023-06-13 ENCOUNTER — VIRTUAL VISIT (OUTPATIENT)
Dept: INTERNAL MEDICINE | Facility: CLINIC | Age: 58
End: 2023-06-13
Payer: COMMERCIAL

## 2023-06-13 DIAGNOSIS — R91.8 PULMONARY NODULES: ICD-10-CM

## 2023-06-13 DIAGNOSIS — R11.2 NAUSEA AND VOMITING, UNSPECIFIED VOMITING TYPE: Primary | ICD-10-CM

## 2023-06-13 DIAGNOSIS — E10.9 TYPE 1 DIABETES MELLITUS WITHOUT COMPLICATION (H): ICD-10-CM

## 2023-06-13 DIAGNOSIS — Z12.11 SPECIAL SCREENING FOR MALIGNANT NEOPLASMS, COLON: ICD-10-CM

## 2023-06-13 DIAGNOSIS — G89.4 CHRONIC PAIN SYNDROME: ICD-10-CM

## 2023-06-13 DIAGNOSIS — E16.2 HYPOGLYCEMIA: ICD-10-CM

## 2023-06-13 LAB — COLONOSCOPY: NORMAL

## 2023-06-13 PROCEDURE — 99496 TRANSJ CARE MGMT HIGH F2F 7D: CPT | Mod: 95 | Performed by: INTERNAL MEDICINE

## 2023-06-13 RX ORDER — AMITRIPTYLINE HYDROCHLORIDE 10 MG/1
40 TABLET ORAL AT BEDTIME
Qty: 120 TABLET | Refills: 11 | Status: SHIPPED | OUTPATIENT
Start: 2023-06-13 | End: 2024-03-20

## 2023-06-13 RX ORDER — METHOCARBAMOL 750 MG/1
750 TABLET, FILM COATED ORAL 4 TIMES DAILY PRN
Qty: 120 TABLET | Refills: 11 | Status: SHIPPED | OUTPATIENT
Start: 2023-06-13 | End: 2024-03-20

## 2023-06-13 ASSESSMENT — PATIENT HEALTH QUESTIONNAIRE - PHQ9: SUM OF ALL RESPONSES TO PHQ QUESTIONS 1-9: 15

## 2023-06-13 NOTE — PATIENT INSTRUCTIONS
Thank you for visiting the Primary Care Center today at the [facility]! The following is some information about our clinic:     Primary Care Center Frequently-Asked Questions    (1) How do I schedule appointments at the [facility]?     Primary Care--to schedule or make changes to an existing appointment, please call our primary care line at [phone].    Labs--to schedule a lab appointment at the [facility] you can use [employer] or call [phone]. If you have a [facility] location that is closer to home, you can reach out to that location for scheduling options.     Imaging--if you need to schedule a CT, X-ray, MRI, ultrasound, or other imaging study you can call [phone] to schedule at the [facility] or any other [facility] imaging location.     Referrals--if a referral to another specialty was ordered you can expect a phone call from their scheduling team. If you have not heard from them in a week, please call us or send us a Ziliko message to check the status or get a scheduling number. Please note that this only applies to internal [facility] referrals. If the referral is external you would need to contact their office for scheduling.     (2) I have a question about my visit, who do I contact?     You can call us at the primary care line at [phone] to ask questions about your visit. You can also send a secure message through Ziliko, which is reviewed by clinic staff. Please note that [employer]iko messages have a twenty-four to forty-eight business hour turnaround time and should not be used for urgent concerns.    (3) How will I get the results of my tests?    If you are signed up for [employer] all tests will be released to you within twenty-four hours of resulting. Please allow three to five days for your doctor to review your results and place a note interpreting the results. If you do not have [employer] you will receive your  results through mail seven to ten business days following the return of the tests. Please note that if there should be any urgent or concerning results that your doctor or their registered nurse will reach out to you the same day as the tests come back. If you have follow up questions about your results or would like to discuss the results in detail please schedule a follow up with your provider either in person or virtually.     (4) How do I get refills of my prescriptions?     You should always first contact your pharmacy for refills of your medications. If submitting a refill request on [employer], please be sure to submit the request only once--repeat requests can cause delays in refill. If you are requesting a NEW medication or a medication related to new symptoms you will need to schedule an appointment with a provider prior to approval. Please note: Routine medication refills have up to one to three business day turnaround whereas controlled substances refills have up to five to seven business day turnaround.    (5) I have new symptoms, what do I do?     If you are having an immediate medical emergency, you should dial 911 for assistance.   For anything urgent that needs to be seen within a few hours to one day you should visit a local urgent care for assistance.  For non-urgent symptoms that need to be seen within a few days to a week you can schedule with an available provider in primary care by going to [employer] or calling [phone].   If you are not sure how serious your symptoms are or you would like to receive medical advice you can always call [phone] to speak with a triage nurse.

## 2023-06-13 NOTE — PROGRESS NOTES
Virtual Visit Details    Type of service:  Video Visit     Dinora is a 58 year old who is being evaluated via a billable video visit.      How would you like to obtain your AVS? MyChart  If the video visit is dropped, the invitation should be resent by: Text to cell phone: [phone]  Will anyone else be joining your video visit? No        Assessment & Plan     Nausea and vomiting, unspecified vomiting type    Improved on amitriptyline, refills sent.  Continues to follow with GI.     - amitriptyline (ELAVIL) 10 MG tablet; Take 4 tablets (40 mg) by mouth At Bedtime    Pulmonary nodules    3mm nodule seen on recent imaging.  One year follow-up recommended since she is a former smoker.     - CT Chest w/o Contrast; Future    Chronic pain syndrome    Robaxin has been helpful, refills sent.  She reports she needs and H&P for an upcoming block procedure and will schedule this.     - methocarbamol (ROBAXIN) 750 MG tablet; Take 1 tablet (750 mg) by mouth 4 times daily as needed for muscle spasms    Special screening for malignant neoplasms, colon    Scope done in the hospital with some tubular adenomas seen.  One year follow-up recommended.     - Colonoscopy Screening  Referral; Future    Type 1 diabetes mellitus without complication (H)  and  Hypoglycemia    She is getting low blood sugars overnight despite not being on any medication for diabetes at this time.  She is working on adjusting her tube feeding regimen and follows with endo.         Juan Jose Gomez MD  [facility] INTERNAL MEDICINE [facility]   Dinora is a 58 year old, presenting for the following health issues:  RECHECK (Hospital follow up; colonoscopy results and MRCP nodule on lung)    HPI       Hospital Follow-up Visit:    Hospital/Nursing Home/IP Rehab Facility: [facility]  Date of Admission: 6/4  Date of Discharge: 6/10  Reason(s) for Admission: sepsis due to Klebsiella,  "possible cholangitis    Was your hospitalization related to COVID-19? No   Problems taking medications regularly:  None  Medication changes since discharge: None  Problems adhering to non-medication therapy:  None    Summary of hospitalization:  [facility] hospital discharge summary reviewed    Started on ertapenem through 6/21 via home infusion.  TPN on hold pending clearance of infection, on tube feeds    Diagnostic Tests/Treatments reviewed.  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     Dinora is having a lot of fatigue discharge discharge.  Having diarrhea trying to figure out how much tube feeding she can tolerate.  Her blood sugars have been getting low overnight and her monitor is waking her up.  They are getting down to 53 and 54.  She is not taking any insulin currently (did have some when she was on TPN). She is following with endo regarding the blood sugars.      She reports that amitriptyline was started recently for her abdominal pain and that seems to be helping.  Robaxin is also helpful for body aches.     She'd like to follow-up on a 3mm RUL nodule seen on CAP CT on 6/3.  One year follow-up since she is a former smoker.    Plan of care communicated with patient       Review of Systems   Constitutional, GI, MSK systems are negative, except as otherwise noted.      Objective    Vitals - Patient Reported  Systolic (Patient Reported): 108  Diastolic (Patient Reported): 64  Weight (Patient Reported): 59.4 kg (131 lb)  Height (Patient Reported): 172.7 cm (5' 8\")  BMI (Based on Pt Reported Ht/Wt): 19.92  Pain Score: Moderate Pain (4)  Pain Loc: Abdomen      Vitals:  No vitals were obtained today due to virtual visit.  GENERAL: alert but appears somewhat " fatigued, no distress  EYES: Eyes grossly normal to inspection.  No discharge or erythema, or obvious scleral/conjunctival abnormalities.  RESP: No audible wheeze, cough, or visible cyanosis.  No visible retractions or increased work of breathing.    SKIN: Visible skin clear. No significant rash, abnormal pigmentation or lesions.  NEURO: Cranial nerves grossly intact.  Mentation and speech appropriate for age.  PSYCH: Mentation appears normal, affect normal/bright, judgement and insight intact, normal speech and appearance well-groomed.        Video-Visit Details    Type of service:  Video Visit   Start time: 904am  End time: 919am    Originating Location (pt. Location): Home  Distant Location (provider location):  Off-site  Platform used for Video Visit: [employer]

## 2023-06-18 ASSESSMENT — HEADACHE IMPACT TEST (HIT 6)
WHEN YOU HAVE HEADACHES HOW OFTEN IS THE PAIN SEVERE: VERY OFTEN
HOW OFTEN HAVE YOU FELT TOO TIRED TO WORK BECAUSE OF YOUR HEADACHES: SOMETIMES
HOW OFTEN DID HEADACHS LIMIT CONCENTRATION ON WORK OR DAILY ACTIVITY: SOMETIMES
WHEN YOU HAVE A HEADACHE HOW OFTEN DO YOU WISH YOU COULD LIE DOWN: ALWAYS
HOW OFTEN DO HEADACHES LIMIT YOUR DAILY ACTIVITIES: VERY OFTEN
HIT6 TOTAL SCORE: 65
HOW OFTEN HAVE YOU FELT FED UP OR IRRITATED BECAUSE OF YOUR HEADACHES: SOMETIMES

## 2023-06-19 ENCOUNTER — OFFICE VISIT (OUTPATIENT)
Dept: NEUROLOGY | Facility: CLINIC | Age: 58
End: 2023-06-19
Payer: COMMERCIAL

## 2023-06-19 DIAGNOSIS — G43.709 CHRONIC MIGRAINE W/O AURA W/O STATUS MIGRAINOSUS, NOT INTRACTABLE: Primary | ICD-10-CM

## 2023-06-19 PROCEDURE — 64615 CHEMODENERV MUSC MIGRAINE: CPT | Performed by: NURSE PRACTITIONER

## 2023-06-19 NOTE — PROGRESS NOTES
"BOTULINUM NEUROTOXIN INJECTION PROCEDURES:  DATA:6/19/2023  INDICATIONS FOR PROCEDURES:   chronic migraine headaches. The patient  is improving overall in her migraine management and continues to be a good candidate for ongoing Botox.  baseline symptoms have been recalcitrant to oral medications and conservative therapy. Patient is here today for a repeat injection with Botox. No problems reported since last Botox.   Wearing off in the last 2 weeks, headache today 3/10. Headache wise \"pretty good\" sometimes \"5 days in the row\" without a headache.    GOAL OF PROCEDURE:  The goal of this procedure is to decrease pain and enhance functional independence.     TOTAL DOSE ADMINISTERED:  Dose Administered:  155 units  Botox (Botulinum Toxin Type A)       2:1 Dilution    Wasted 45 units     CONSENT:  The risks, benefits, and treatment options were discussed with the patient who agreed to proceed.     Written consent was obtained      EQUIPMENT USED:  Needles-30 gauge, 0.5 inches for injections  Four 1-ml tuberculin syringes for injections  One sodium chloride 10 ml vial preservative free  Alcohol swabs     SKIN PREPARATION:  Skin preparation was performed using an alcohol wipe.        AREA/MUSCLE INJECTED:  155 units of Botox     Right upper Trapezius (upper cervical) - 5 units of Botox at 3 site/s.   Left upper Trapezius (upper cervical) - 5 units of Botox at 3 site/s.      Right cervical paraspinals - 5 units of Botox at 2 site/s.   Left cervical paraspinals - 5 units of Botox at 2 site/s.      Left occipitalis - 5 units of Botox at 3 site/s.  Right occipitalis -5 units of Botox at 3 site/s        Right Frontalis - 5 units of Botox at 2 site/s.  Left Frontalis - 5 units of Botox at 2 site/s.     Right Temporalis - 5 units of Botox at 4 site/s.  Left Temporalis - 5 units of Botox at 4 site/s.     Right  - 5 units of Botox at 1 site/s.              Left  - 5 units of Botox at 1 site/s.     Procerus - 5 " units of Botox at 1 site/s.     RESPONSE TO PROCEDURE:  tolerated the procedure well and there were no immediate complications.  Patient was allowed to recover for an appropriate period of time and was discharged home in stable condition.     FOLLOW UP:     Repeat Botox injections in 12 weeks        This procedure was performed under a hospital privileging agreement with NATALY Bonner, [facility]

## 2023-06-19 NOTE — LETTER
"6/19/2023       RE: Dinora Mcghee  [address] [facility] [id number]     Dear Colleague,    Thank you for referring your patient, Dinora Mcghee, to the [facility] [place] [facility]. Please see a copy of my visit note below.    BOTULINUM NEUROTOXIN INJECTION PROCEDURES:  DATA:6/19/2023  INDICATIONS FOR PROCEDURES:   chronic migraine headaches. The patient  is improving overall in her migraine management and continues to be a good candidate for ongoing Botox.  baseline symptoms have been recalcitrant to oral medications and conservative therapy. Patient is here today for a repeat injection with Botox. No problems reported since last Botox.   Wearing off in the last 2 weeks, headache today 3/10. Headache wise \"pretty good\" sometimes \"5 days in the row\" without a headache.    GOAL OF PROCEDURE:  The goal of this procedure is to decrease pain and enhance functional independence.     TOTAL DOSE ADMINISTERED:  Dose Administered:  155 units  Botox (Botulinum Toxin Type A)       2:1 Dilution    Wasted 45 units     CONSENT:  The risks, benefits, and treatment options were discussed with the patient who agreed to proceed.     Written consent was obtained      EQUIPMENT USED:  Needles-30 gauge, 0.5 inches for injections  Four 1-ml tuberculin syringes for injections  One sodium chloride 10 ml vial preservative free  Alcohol swabs     SKIN PREPARATION:  Skin preparation was performed using an alcohol wipe.        AREA/MUSCLE INJECTED:  155 units of Botox     Right upper Trapezius (upper cervical) - 5 units of Botox at 3 site/s.   Left upper Trapezius (upper cervical) - 5 units of Botox at 3 site/s.      Right cervical paraspinals - 5 units of Botox at 2 site/s.   Left cervical paraspinals - 5 units of Botox at 2 site/s.      Left occipitalis - 5 units of Botox at 3 site/s.  Right occipitalis -5 units of Botox at 3 site/s        Right Frontalis - " 5 units of Botox at 2 site/s.  Left Frontalis - 5 units of Botox at 2 site/s.     Right Temporalis - 5 units of Botox at 4 site/s.  Left Temporalis - 5 units of Botox at 4 site/s.     Right  - 5 units of Botox at 1 site/s.              Left  - 5 units of Botox at 1 site/s.     Procerus - 5 units of Botox at 1 site/s.     RESPONSE TO PROCEDURE:  tolerated the procedure well and there were no immediate complications.  Patient was allowed to recover for an appropriate period of time and was discharged home in stable condition.     FOLLOW UP:     Repeat Botox injections in 12 weeks        This procedure was performed under a hospital privileging agreement with NATALY Bonner, [facility]

## 2023-06-20 DIAGNOSIS — E13.9 POST-PANCREATECTOMY DIABETES (H): ICD-10-CM

## 2023-06-20 DIAGNOSIS — E10.9 TYPE 1 DIABETES MELLITUS WITHOUT COMPLICATION (H): ICD-10-CM

## 2023-06-20 DIAGNOSIS — E89.1 POST-PANCREATECTOMY DIABETES (H): ICD-10-CM

## 2023-06-20 DIAGNOSIS — Z78.9 ENCOUNTER FOR GASTROJEJUNAL (GJ) TUBE PLACEMENT: Primary | ICD-10-CM

## 2023-06-20 DIAGNOSIS — Z90.410 POST-PANCREATECTOMY DIABETES (H): ICD-10-CM

## 2023-06-21 ENCOUNTER — DOCUMENTATION ONLY (OUTPATIENT)
Dept: GASTROENTEROLOGY | Facility: CLINIC | Age: 58
End: 2023-06-21
Payer: COMMERCIAL

## 2023-06-21 NOTE — PROGRESS NOTES
Called PT and confirmed Appointment.    Called to remind patient of their upcoming appointment with our GI clinic, on 06/26/23 at 11:30 AM with Dr. Mandeep Park. This appointment is scheduled as a video visit. You will receive a call approximately 30 minutes prior to check you in, you must be in [place] for this visit., if your appointment is virtual (video or telephone) you need to be in [place] for the visit. To reschedule or cancel patient to call [phone].      SK

## 2023-06-22 ENCOUNTER — VIRTUAL VISIT (OUTPATIENT)
Dept: ONCOLOGY | Facility: CLINIC | Age: 58
End: 2023-06-22
Attending: SOCIAL WORKER
Payer: COMMERCIAL

## 2023-06-22 DIAGNOSIS — Z51.5 PALLIATIVE CARE PATIENT: ICD-10-CM

## 2023-06-22 DIAGNOSIS — F43.23 ADJUSTMENT DISORDER WITH MIXED ANXIETY AND DEPRESSED MOOD: Primary | ICD-10-CM

## 2023-06-22 PROCEDURE — 90834 PSYTX W PT 45 MINUTES: CPT | Mod: VID | Performed by: SOCIAL WORKER

## 2023-06-22 RX ORDER — LIDOCAINE 50 MG/G
PATCH TOPICAL EVERY 24 HOURS
COMMUNITY

## 2023-06-22 RX ORDER — FLUTICASONE PROPIONATE 50 MCG
1 SPRAY, SUSPENSION (ML) NASAL DAILY
Status: ON HOLD | COMMUNITY
End: 2023-11-09

## 2023-06-22 NOTE — PROGRESS NOTES
Virtual Visit Details    Type of service:  Video Visit     Originating Location (pt. Location): Home    Distant Location (provider location):  On-site  Platform used for Video Visit: [employer]     Telemedicine Visit: The patient's condition can be safely assessed and treated via synchronous audio and visual telemedicine encounter.        Palliative Care Clinical Social Work Return Appointment    PLEASE NOTE:  THIS IS A MENTAL HEALTH NOTE.  OTHER PROVIDERS VIEWING THIS NOTE SHOULD USE THIS ONLY FOR UNDERSTANDING THE CONTEXT OF THE PATIENT'S EXPERIENCE.  TOPICS DESCRIBED IN THIS NOTE SHOULD NOT BE REFERENCED TO THE PATIENT BY MEDICAL PROVIDERS.    Dinora Mcghee is a 58  year old woman with multiple medical conditions including chronic pancreatitis s/p TPAIT, long term complications from gastroparesis, constipation, GERD, migraines.  Had GJ placed Sept 2021, recovery was complicated and she ended up with a venting g-tube.  Due to complications with PO tolerance she had surgical placement of a J tube, and a resection of densely adhered small bowel.  Started on tube feedings, now on TPN seen today via [email or web address] video for a return psychotherapy appointment to address adjustment disorder with anxiety and depressed mood as it relates to coping with illness and treatment.    Mental Status Exam:(List all that apply)      Appearance: Appropriate      Eye Contact: Good       Orientation: Yes, x4      Mood: Depressed      Affect: Appropriate      Thought Content: Clear         Thought Form: Logical      Psychomotor Behavior: Normal    Visit themes: MCP    Adjustment to Illness:  Hospitalized for sepsis since we last met. Has been home about 10 days.  Had to go off TPN due to infection concerns.     Mental Health (thoughts, feelings, actions, coping, and symptoms):     Session II  of Meaning Centered Psychotherapy --  Meaning and Illness Identity before and after illness   Reviewed sources of meaning and definitions of meaning   Led  "experiential exercise exploring meaningful moments and sources of meaning:    Identity before illness: starter, tells people I love them,  master of 10 million moving pieces, athlete, person of love and light    SInce illness: \"it's all different now\" source of meaning have changed.  Still identifies with a sense of tenacity but now it is directed to managing her health care, \"which itself is 10 milion moving pieces\" \"I can still be a good friend, I'm more compassionated, and I\"m a better listener\" Identifies as more introspective now      Still grieves with feeling disconnected from sources of meaning / connection with her  and family--especially outdoors activities, and eating together.  Encouraged her  to accompany her son on a week long through [employer] in [place].  She had friends stay with her while he was away.         Has to be very intentional about energy and how it is spent   Trying to do some things that feel normal such as golfing, going for short walks.      Helpful activities: time with family.  Meditating, knitting, tapping.  Uses EMDR techniques.    Looking forward to 3 weddings, a class reunion,     Helpful cognitions:     Unhelpful and/or triggering or exacerbating factors:     Body-Mind Skills Education: uses tapping and EMDR     Relationships:  and children, extended family    End of Life and Advance Care Planning:     Main therapeutic interventions provided this session include:     Provide psychoeducation, Facilitate processing of thoughts and feelings and Meaning Centered Psychotherapy Session 1    Plan:  Will return for psychotherapy with palliative care focus in 1 week    Time spent with patient/family:  42 (Start 2:30, end 3:22)    JAIDA Nguyen, [facility]   Palliative Care Clinical   Ph: [phone]      DO NOT SEND ANY LETTERS                "

## 2023-06-22 NOTE — LETTER
6/22/2023         RE: Dinora Mcghee  [address] [facility] [address]        Dear Colleague,    Thank you for referring your patient, Dinora Mcghee, to the [facility] [place]. Please see a copy of my visit note below.    Virtual Visit Details    Type of service:  Video Visit     Originating Location (pt. Location): Home    Distant Location (provider location):  On-site  Platform used for Video Visit: [email or web address]     Telemedicine Visit: The patient's condition can be safely assessed and treated via synchronous audio and visual telemedicine encounter.        Palliative Care Clinical Social Work Return Appointment    PLEASE NOTE:  THIS IS A MENTAL HEALTH NOTE.  OTHER PROVIDERS VIEWING THIS NOTE SHOULD USE THIS ONLY FOR UNDERSTANDING THE CONTEXT OF THE PATIENT'S EXPERIENCE.  TOPICS DESCRIBED IN THIS NOTE SHOULD NOT BE REFERENCED TO THE PATIENT BY MEDICAL PROVIDERS.    Dinora Mcghee is a 58  year old woman with multiple medical conditions including chronic pancreatitis s/p TPAIT, long term complications from gastroparesis, constipation, GERD, migraines.  Had GJ placed Sept 2021, recovery was complicated and she ended up with a venting g-tube.  Due to complications with PO tolerance she had surgical placement of a J tube, and a resection of densely adhered small bowel.  Started on tube feedings, now on TPN seen today via [employer] video for a return psychotherapy appointment to address adjustment disorder with anxiety and depressed mood as it relates to coping with illness and treatment.    Mental Status Exam:(List all that apply)      Appearance: Appropriate      Eye Contact: Good       Orientation: Yes, x4      Mood: Depressed      Affect: Appropriate      Thought Content: Clear         Thought Form: Logical      Psychomotor Behavior: Normal    Visit themes: MCP    Adjustment to Illness:  Hospitalized for sepsis since we last met. Has been home about 10 days.  Had to go off TPN due to infection  "concerns.     Mental Health (thoughts, feelings, actions, coping, and symptoms):     Session II  of Meaning Centered Psychotherapy --  Meaning and Illness Identity before and after illness   Reviewed sources of meaning and definitions of meaning   Led experiential exercise exploring meaningful moments and sources of meaning:    Identity before illness: starter, tells people I love them,  master of 10 million moving pieces, athlete, person of love and light    SInce illness: \"it's all different now\" source of meaning have changed.  Still identifies with a sense of tenacity but now it is directed to managing her health care, \"which itself is 10 milion moving pieces\" \"I can still be a good friend, I'm more compassionated, and I\"m a better listener\" Identifies as more introspective now      Still grieves with feeling disconnected from sources of meaning / connection with her  and family--especially outdoors activities, and eating together.  Encouraged her  to accompany her son on a week long through [employer] in [place].  She had friends stay with her while he was away.         Has to be very intentional about energy and how it is spent   Trying to do some things that feel normal such as golfing, going for short walks.      Helpful activities: time with family.  Meditating, knitting, tapping.  Uses EMDR techniques.    Looking forward to 3 weddings, a class reunion,     Helpful cognitions:     Unhelpful and/or triggering or exacerbating factors:     Body-Mind Skills Education: uses tapping and EMDR     Relationships:  and children, extended family    End of Life and Advance Care Planning:     Main therapeutic interventions provided this session include:     Provide psychoeducation, Facilitate processing of thoughts and feelings and Meaning Centered Psychotherapy Session 1    Plan:  Will return for psychotherapy with palliative care focus in 1 week    Time spent with patient/family:  42 (Start 2:30, end " 3:22)    JAIDA Nguyen, [facility]   Palliative Care Clinical   Ph: [phone]      DO NOT SEND ANY LETTERS                    Again, thank you for allowing me to participate in the care of your patient.        Sincerely,        VASQUEZ Nguyen

## 2023-06-22 NOTE — PROGRESS NOTES
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    Problem List:  1. Klebsiella pneumoniae bacteremia  2. Port-a-cath in place   3. TPN use  4. G-tube site  - redness, pain, bleeding, and purulent drainage  5. Acute on chronic left-sided abdominal pain - may be related to G-tube irritation vs infection          Recommendations:  1. Will plan for test of cure blood cultures to be done on 6/26 (timed one week from finishing antibiotics); will need one set drawn from peripheral site and one from the port  2. Swabbed G-tube site, unsure relevance of swab results given may have been tube feed liquid  3. Coordinating with Dr. Park about concerns regarding G-tube   4. Consideration of   5. Follow up with ID PRN            I spent 81 minutes as part of a visit on the date of the encounter doing chart review, history and exam, documentation, and care coordination.      NATALY Kerns, CNP  Infectious Diseases  Pager# [phone] or Vocera (preferred)          Interval History:   Low grade fevers and bodyaches and nightsweats after discharge home but these have resolved. Had diarrhea but this resolved after finishing antibiotics. Overall feeling better    Still having significant pain around G-tube site, with  drainage around insertion site (green or brown) increased about a week ago, over the last week having to change in the middle of the day due to increased drainage. Small bleeding from granulation tissue surrounding the G-tube, granulation tissue has started growing over the last week and now popping out of her insertion. Sharp stabbing intermittent, but then leaves a dull ache through to back. Worse with moving positions and from sitting to standing. Ice and heat help. A little bit worse since hospitalization, more bloating with attempting to eat more foods.    Line: PICC or Port? Well-healed, no redness, pain or drainage    Denies headache, fever, chills, night sweats, fatigue, sore throat, cough, dyspnea, nausea, vomiting, abdominal pain, diarrhea, dysuria, myalgias, arthralgias, lymphadenopathy, acute rash, or new wounds.        History of Present Illness:     See Vicenta Stubbs PA-C's consult note for full HPI: 6/6/23      Infectious Diseases Indication: K.pneumoniae bacteremia     Antibiotic Information  Name of Antibiotic Dose of Antibiotic1 Anticipated duration Effective start date2 End date   ertapenem 1g IV q24hrs 14 days 6/5/23 6/18/23                                      Review of Systems:     12-point ROS performed; pertinent positives and negatives per above         Medications & Allergies:     Current Outpatient Medications   Medication     acetaminophen (TYLENOL) 325 MG/10.15ML solution     amitriptyline (ELAVIL) 10 MG tablet     amylase-lipase-protease (CREON) 12008-06023 units CPEP per EC capsule     blood glucose (PAT CONTOUR NEXT) test strip     blood glucose monitoring (PAT MICROLET) lancets     cetirizine (ZYRTEC) 10 MG tablet     Continuous Blood Gluc Sensor (FREESTYLE LISA 3 SENSOR) MISC     cyclobenzaprine (FLEXERIL) 10 MG tablet     diphenhydrAMINE (BENADRYL ALLERGY) 25 MG capsule     estradiol (VAGIFEM) 10 MCG TABS vaginal tablet     famotidine (PEPCID) 40 MG/5ML suspension     glucagon 1  "MG kit     glucose 40 % GEL gel     ibuprofen (ADVIL/MOTRIN) 400 MG tablet     insulin syringe-needle U-100 (30G X 1/2\" 0.3 ML) 30G X 1/2\" 0.3 ML miscellaneous     levothyroxine (SYNTHROID/LEVOTHROID) 137 MCG tablet     methocarbamol (ROBAXIN) 750 MG tablet     montelukast (SINGULAIR) 10 MG tablet     Multiple Vitamins-Minerals (MULTIVITAMINS W/MINERALS) liquid     Nutritional Supplements (BOOST HIGH PROTEIN) LIQD     pantoprazole (PROTONIX) 40 mg IV push injection     parenteral nutrition - PTA/DISCHARGE ORDER     promethazine 25 mg     promethazine-phenylephrine (PROMETHAZINE VC) 6.25-5 MG/5ML syrup     prucalopride succinate (MOTEGRITY) 2 MG tablet     scopolamine (TRANSDERM) 1 MG/3DAYS 72 hr patch     sennosides (SENOKOT) 8.8 MG/5ML syrup     Sharps Container (BD SHARPS ) MISC     sodium chloride 0.9% SOLN BOLUS     STATIN NOT PRESCRIBED (INTENTIONAL)     thiamine (B-1) 100 MG tablet     UNABLE TO FIND     zinc oxide - white petrolatum (CRITIC-AID THICK MOIST BARRIER) 20-51% PSTE topical paste     ZOLMitriptan (ZOMIG-ZMT) 5 MG ODT     Current Facility-Administered Medications   Medication     Botulinum Toxin Type A (BOTOX) 200 units injection 155 Units         Allergies   Allergen Reactions     Apple Juice Anaphylaxis     Corticosteroids Other (See Comments)     All oral, IV and injectable steroids are contraindicated (unless in life threatening situations) in Islet Auto transplant recipients. They can cause irreversible loss of islet cell function. Please contact patient's transplant care coordinator ADI Gaffney RN at [phone]/pager [phone] and/or endocrinologist prior to administration.       Depakote [Divalproex Sodium] Other (See Comments)     Chest pain     Zithromax [Azithromycin Dihydrate] Anaphylaxis     Noted in 4/7/08 ER     Bromfenac      Other reaction(s): Headache     Codeine      Other reaction(s): Hallucinations     Darvocet [Propoxyphene N-Apap] Itching     Morphine Nausea " "and Vomiting and Rash     Nalbuphine Hcl Rash     RASH :nubaine     Zosyn [Piperacillin-Tazobactam In Dex] Rash     Possible allergy, did have a diffuse rash that seemed drug related but could have also been related to soap in the hospital.      Bactrim [Sulfamethoxazole W-Trimethoprim] Other (See Comments) and Nausea and Vomiting     Severely low liver function.     Statins Other (See Comments)     Previous liver issues, risks vs benefits felt to not warrant statin.  Discussed Oct 2022 visit     Tape [Adhesive Tape] Blisters     Tramadol      Zofran [Ondansetron] Other (See Comments)     migraine     Flagyl [Metronidazole] Hives and Rash            Physical Exam:     /76 (BP Location: Right arm, Patient Position: Sitting, Cuff Size: Adult Regular)   Pulse 113   Wt 60.4 kg (133 lb 4 oz)   SpO2 96%   BMI 20.26 kg/m  \"  Vitals:    06/23/23 0848   Weight: 60.4 kg (133 lb 4 oz)       Physical Examination:  GENERAL:  well-appearing. In no acute distress.  HEAD:  Head is normocephalic, atraumatic   EYES:  Eyes have anicteric sclerae without conjunctival injection.   ENT:  Oropharynx is moist. Tongue is midline.   NECK:  Supple.   LUNGS:  Breathing comfortably on room air. No accessory muscle use.    CARDIOVASCULAR: heart RRR. Skin warm, well-perfused.  Abdomen: G-tube present left abdomen with surrounding erythema, small white drainage deep with tube insertion, able to express small amount, no odor; small amount of friable redundant tissue along G-tube, tenderness and bleeding (see media section for updated photo)  SKIN:  No acute rashes with visible skin.    EXTREMITIES: generalized non-pitting swelling bilateral lower extremities   NEUROLOGIC:  Grossly nonfocal. Active x4 extremities  LINES: dressing C/D/I, no surrounding erythema or edema, non tender    Microbiology Data   Pertinent Micro:    Culture   Date Value Ref Range Status   06/06/2023 No Growth  Final   06/06/2023 No Growth  Final   06/06/2023 No " Growth  Final   06/05/2023 No Growth  Final   06/05/2023 No Growth  Final   06/04/2023 Positive on the 1st day of incubation (A)  Final   06/04/2023 Klebsiella pneumoniae (AA)  Final     Comment:     2 of 2 bottlesSusceptibilities done on previous cultures   06/04/2023 Positive on the 1st day of incubation (A)  Final   06/04/2023 Klebsiella pneumoniae (AA)  Final     Comment:     2 of 2 bottles   05/10/2023 No Growth  Final   05/04/2023 Candida albicans (A)  Final   12/05/2021 2+ Klebsiella pneumoniae (A)  Final   12/05/2021 2+ Klebsiella pneumoniae (A)  Final   12/05/2021 2+ Citrobacter freundii complex (A)  Final   12/05/2021 3+ Streptococcus anginosus (A)  Final     Comment:     This organism is susceptible to ampicillin, penicillin, vancomycin and the cephalosporins. If treatment is required and your patient is allergic to penicillin, contact the microbiology lab within 5 days to request susceptibility testing.   09/23/2021 No Growth  Final   09/23/2021 No Growth  Final   09/19/2021 >100,000 CFU/mL Mixture of urogenital andry  Final       Last check of C difficile  C Diff Toxin B PCR   Date Value Ref Range Status   08/08/2016  NEG Final    Negative  Negative: Clostridium difficile target DNA sequences NOT detected, presumed   negative for Clostridium difficile toxin B or the number of bacteria present   may be below the limit of detection for the test.   FDA approved assay performed using [employer] GeneXpert real-time PCR.   A negative result does not exclude actual disease due to Clostridium difficile   and may be due to improper collection, handling and storage of the specimen or   the number of organisms in the specimen is below the detection limit of the   assay.         Urine Studies     Recent Labs   Lab Test 06/04/23  1624 05/10/23  1812 12/19/22  0700 12/03/21  1412 09/19/21  1808 09/08/21  0314   URINEPH 6.0 7.0 6.0 7.0 5.0 5.5   NITRITE Negative Negative Negative Negative Negative Negative   LEUKEST  Negative Large* Negative Negative Moderate* Trace*   WBCU 1 28*  --  <1 10* 3       CSF testing   No lab results found.    Invalid input(s): CADAM, EVPCR, ENTPCR, ENTEROVIRUS    Laboratory Data:   Metabolic Studies       Recent Labs   Lab Test 06/10/23  0706 06/09/23  0551 06/09/23  0445 06/05/23  0703 06/05/23  0702 06/04/23  2231 06/04/23  1553 06/04/23  1348 06/04/23  1343 02/17/22  1244 02/17/22  1115     --  139   < >  --   --   --    < >  --    < > 138   POTASSIUM 4.3  --  4.1   < >  --   --   --    < >  --    < > 4.0   CHLORIDE 103  --  103   < >  --   --   --    < >  --    < > 102   CO2 27  --  26   < >  --   --   --    < >  --    < > 29   ANIONGAP 8  --  10   < >  --   --   --    < >  --    < > 7   BUN 9.1  --  11.7   < >  --   --   --    < >  --    < > 15   CR 0.54  --  0.51   < >  --   --   --    < >  --    < > 0.71   GFRESTIMATED >90  --  >90   < >  --   --   --    < >  --    < > >90   *   < > 100*   < >  --    < >  --    < >  --    < > 132*  132*   A1C  --   --   --   --   --   --   --   --   --   --  5.4   KASSI 8.4*  --  8.8   < >  --   --   --    < >  --    < > 9.6   PHOS 3.3  --  3.5   < > 2.2*  --   --    < >  --    < >  --    MAG 2.0  --  1.9   < >  --    < >  --    < >  --    < >  --    LACT  --   --   --   --   --   --  1.3  --  2.7*   < >  --    PCAL  --   --  0.49*   < >  --   --   --    < >  --   --   --    FGTL  --   --   --   --  <31  --   --   --   --   --   --     < > = values in this interval not displayed.       Hepatic Studies    Recent Labs   Lab Test 06/10/23  0706 06/09/23  0445 06/08/23  0706 06/04/23  1348 05/10/23  0719   BILITOTAL 0.4 0.4 0.4   < > 0.5   DBIL  --   --   --   --  <0.20   ALKPHOS 162* 154* 174*   < > 125*   PROTTOTAL 5.9* 5.8* 6.2*   < > 7.4   ALBUMIN 3.5 3.3* 3.5   < > 4.3   AST 40* 31 30   < > 43*   ALT 99* 101* 133*   < > 67*    < > = values in this interval not displayed.       Hematology Studies      Recent Labs   Lab Test 06/10/23  0706  06/09/23  0445 06/08/23  0706 06/07/23  0707 06/06/23  0549 06/05/23  0702 07/13/21  1731 12/17/20  0739 09/14/20  1529   WBC 8.1 6.5 5.5 4.8 8.8 14.4*   < > 5.0 6.2   ANEU  --   --   --   --   --   --   --  1.5* 2.6   ALYM  --   --   --   --   --   --   --  2.7 2.6   GABRIELE  --   --   --   --   --   --   --  0.5 0.7   AEOS  --   --   --   --   --   --   --  0.3 0.2   HGB 11.5* 11.0* 11.7 11.6* 11.1* 10.9*   < > 13.5 14.3   HCT 34.9* 34.0* 36.7 36.6 34.7* 34.5*   < > 41.4 42.9    302 277 228 184 182   < > 363 353    < > = values in this interval not displayed.       Medication levels  No lab results found.    Invalid input(s): AMIK    Inflammatory Markers    Recent Labs   Lab Test 09/16/21  1308 12/29/16  0620 06/06/15  1903   SED 10  --   --    CRP  --  90.0* 71.0*       Imaging:  Results for orders placed or performed during the hospital encounter of 06/04/23   XR Chest Port 1 View    Narrative    EXAM: XR CHEST PORT 1 VIEW  6/4/2023 5:33 PM     HISTORY:  sepsis, assessing for source       COMPARISON:  9/23/2021    FINDINGS:   AP portable semiupright view of the chest. Right chest wall cydney cath  distal tip in the high SVC. Trachea is midline. Cardiomediastinal  silhouette and pulmonary vasculature are within normal limits. No  focal airspace opacity, pleural effusion or appreciable pneumothorax.    No acute osseous abnormality. Visualized upper abdomen is  unremarkable.        Impression    IMPRESSION:   No acute airspace disease.    I have personally reviewed the examination and initial interpretation  and I agree with the findings.    BOB RIVERA MD         SYSTEM ID:  [id number]   US Abdomen Limited    Addendum: 6/7/2023    Previous final report notes visualized portions of the pancreatic head  and body are within normal limits. Erroneous finding as the patient  has previously undergone partial pancreatectomy, echogenic material  visualized is likely fat within the lesser sac.    I have personally  reviewed the examination and initial interpretation  and I agree with the findings.    CARL MAR MD         SYSTEM ID:  [id number]      Narrative    EXAMINATION: Limited Abdominal Ultrasound, 6/5/2023 10:32 AM     COMPARISON: 5/9/2023, 1/12/2023    HISTORY: Focus to hepatobiliary/RUQ; sepsis      FINDINGS:   Fluid: Equivocal trace perihepatic ascites.  No pleural effusion.    Liver: The liver demonstrates diffusely echogenic and heterogeneous  parenchyma, and is enlarged measuring 18.9 cm in craniocaudal  dimension. There is no focal mass. Patent main portal vein with flow  directed towards liver.    Gallbladder: Cholecystectomy. Normal caliber and intrahepatic biliary  system.    Bile Ducts: The common bile duct measures approximately 2 mm in  diameter, although difficult to visualize with ultrasound.    Pancreas: Visualized portions of the head and body of the pancreas are  unremarkable.     Kidney: The right kidney measures 12.2 cm long. There is no  hydronephrosis, mass lesion or obstructing calculus. Right kidney  cortex is diffusely prominent and echogenic.      Impression    IMPRESSION:   1.  Sonographic evidence of intrinsic hepatic parenchymal disease  including hepatic steatosis.  No focal liver lesion. Equivocal trace  perihepatic ascites.     2.  Echogenic right kidney compatible with medical renal disease.  3. Cholecystectomy.  Common bile duct measures approximately 2 mm in  diameter, although difficult to visualize with ultrasound.       I have personally reviewed the examination and initial interpretation  and I agree with the findings.    CATALINA PAYNE MD         SYSTEM ID:  [id number]   MR Abdomen MRCP w/o & w Contrast    Narrative    MRI ABDOMEN    CLINICAL HISTORY:  History of biliary obstruction status post  cholecystectomy, ERCP/stent, admitted with sepsis, exclude cholangitis    TECHNIQUE:  Images were acquired with and without intravenous contrast  through the abdomen. The following MR  images were acquired: TrueFISP,  multiplanar T2 weighted, axial T1 in/out of phase, axial fat-saturated  T1, diffusion-weighted. Multiplanar T1-weighted images with fat  saturation were before contrast administration and at multiple time  points following the administration of intravenous contrast. Contrast  dose: 6 mL gadavist    FINDINGS:    Comparison study: CT 5/19/2023, 1/12/2023    Liver: Heterogeneous enhancement of the liver on the arterial phase  with mild periportal edema. No focal hepatic mass. Homogenous  enhancement of the liver parenchyma on the portal venous and delayed  phases. No hepatic steatosis or evidence of iron overload. The common  bile duct and intrahepatic biliary system do not appear dilated.  Somewhat narrowed appearance of the common bile duct near the  bifurcation without obvious obstructing mass. Postcontrast, the  biliary tree does not appear significantly hyperemic and no definite  thickening of the common bile duct is appreciated.     Gallbladder: Cholecystectomy.    Spleen: Splenectomy.    Kidneys: No focal mass. No hydronephrosis.    Adrenal glands: Unremarkable.    Pancreas: Pancreatectomy.    Bowel: Percutaneous gastrostomy and jejunostomy tubes. No obstruction.    Lymph nodes: Unremarkable.    Blood vessels: The aorta and portal vein are patent.    Lung bases: Small bilateral pleural effusions.    Bones and soft tissues: Hemangioma suspected in the L2 vertebral body  on the right, series 4 image 17, stable from prior CT.    Mesentery and abdominal wall: Minor body wall edema.    Ascites: Small volume ascites.      Impression    IMPRESSION:  1. The common bile duct appears somewhat narrowed near the  bifurcation, however there is no upstream intrahepatic biliary  dilatation to suggest obstruction. No definite findings to suggest  acute cholangitis.  2. Heterogeneous enhancement of the liver on arterial phase sequence  with associated periportal edema. Findings are nonspecific  but can be  seen with acute hepatitis. Alternatively, periportal edema may be due  to overall third spacing of fluid with ascites and some body wall  edema also noted. Correlation with liver function tests.  3. Small volume ascites.    I have personally reviewed the examination and initial interpretation  and I agree with the findings.    MACRINA CORRIGAN MD         SYSTEM ID:  [id number]     *Note: Due to a large number of results and/or encounters for the requested time period, some results have not been displayed. A complete set of results can be found in Results Review.

## 2023-06-22 NOTE — LETTER
6/22/2023         RE: Dinora Mcghee  [address] [facility] [address]        Dear Colleague,    Thank you for referring your patient, Dinora Mcghee, to the [facility]. Please see a copy of my visit note below.    Virtual Visit Details    Type of service:  Video Visit     Originating Location (pt. Location): Home    Distant Location (provider location):  On-site  Platform used for Video Visit: [employer]     Telemedicine Visit: The patient's condition can be safely assessed and treated via synchronous audio and visual telemedicine encounter.        Palliative Care Clinical Social Work Return Appointment    PLEASE NOTE:  THIS IS A MENTAL HEALTH NOTE.  OTHER PROVIDERS VIEWING THIS NOTE SHOULD USE THIS ONLY FOR UNDERSTANDING THE CONTEXT OF THE PATIENT'S EXPERIENCE.  TOPICS DESCRIBED IN THIS NOTE SHOULD NOT BE REFERENCED TO THE PATIENT BY MEDICAL PROVIDERS.    Dinora Mcghee is a 58  year old woman with multiple medical conditions including chronic pancreatitis s/p TPAIT, long term complications from gastroparesis, constipation, GERD, migraines.  Had GJ placed Sept 2021, recovery was complicated and she ended up with a venting g-tube.  Due to complications with PO tolerance she had surgical placement of a J tube, and a resection of densely adhered small bowel.  Started on tube feedings, now on TPN seen today via [employer] video for a return psychotherapy appointment to address adjustment disorder with anxiety and depressed mood as it relates to coping with illness and treatment.    Mental Status Exam:(List all that apply)      Appearance: Appropriate      Eye Contact: Good       Orientation: Yes, x4      Mood: Depressed      Affect: Appropriate      Thought Content: Clear         Thought Form: Logical      Psychomotor Behavior: Normal    Visit themes: MCP    Adjustment to Illness:  Hospitalized for sepsis since we last met. Has been home about 10 days.  Had to go off TPN due to infection  "concerns.     Mental Health (thoughts, feelings, actions, coping, and symptoms):     Session II  of Meaning Centered Psychotherapy --  Meaning and Illness Identity before and after illness   Reviewed sources of meaning and definitions of meaning   Led experiential exercise exploring meaningful moments and sources of meaning:    Identity before illness: starter, tells people I love them,  master of 10 million moving pieces, athlete, person of love and light    SInce illness: \"it's all different now\" source of meaning have changed.  Still identifies with a sense of tenacity but now it is directed to managing her health care, \"which itself is 10 milion moving pieces\" \"I can still be a good friend, I'm more compassionated, and I\"m a better listener\" Identifies as more introspective now      Still grieves with feeling disconnected from sources of meaning / connection with her  and family--especially outdoors activities, and eating together.  Encouraged her  to accompany her son on a week long through [employer] in [place].  She had friends stay with her while he was away.         Has to be very intentional about energy and how it is spent   Trying to do some things that feel normal such as golfing, going for short walks.      Helpful activities: time with family.  Meditating, knitting, tapping.  Uses EMDR techniques.    Looking forward to 3 weddings, a class reunion,     Helpful cognitions:     Unhelpful and/or triggering or exacerbating factors:     Body-Mind Skills Education: uses tapping and EMDR     Relationships:  and children, extended family    End of Life and Advance Care Planning:     Main therapeutic interventions provided this session include:     Provide psychoeducation, Facilitate processing of thoughts and feelings and Meaning Centered Psychotherapy Session 1    Plan:  Will return for psychotherapy with palliative care focus in 1 week    Time spent with patient/family:  42 (Start 2:30, end " 3:22)    JAIDA Nguyen, [facility]   Palliative Care Clinical   Ph: [phone]      DO NOT SEND ANY LETTERS                    Again, thank you for allowing me to participate in the care of your patient.        Sincerely,        VASQUEZ Nguyen

## 2023-06-22 NOTE — NURSING NOTE
Is the patient currently in the state of [place]? YES    Visit mode:VIDEO    If the visit is dropped, the patient can be reconnected by: VIDEO VISIT: Text to cell phone: [phone]    Will anyone else be joining the visit? NO      How would you like to obtain your AVS? MyChart    Are changes needed to the allergy or medication list? NO    Reason for visit: EDWARDO [facility]

## 2023-06-23 ENCOUNTER — PATIENT OUTREACH (OUTPATIENT)
Dept: CARE COORDINATION | Facility: CLINIC | Age: 58
End: 2023-06-23

## 2023-06-23 ENCOUNTER — OFFICE VISIT (OUTPATIENT)
Dept: INFECTIOUS DISEASES | Facility: CLINIC | Age: 58
End: 2023-06-23
Attending: INTERNAL MEDICINE
Payer: COMMERCIAL

## 2023-06-23 ENCOUNTER — PATIENT OUTREACH (OUTPATIENT)
Dept: GASTROENTEROLOGY | Facility: CLINIC | Age: 58
End: 2023-06-23

## 2023-06-23 VITALS
BODY MASS INDEX: 20.26 KG/M2 | OXYGEN SATURATION: 96 % | HEART RATE: 113 BPM | SYSTOLIC BLOOD PRESSURE: 111 MMHG | WEIGHT: 133.25 LBS | DIASTOLIC BLOOD PRESSURE: 76 MMHG

## 2023-06-23 DIAGNOSIS — Z79.2 ENCOUNTER FOR LONG-TERM (CURRENT) USE OF ANTIBIOTICS: Primary | ICD-10-CM

## 2023-06-23 DIAGNOSIS — R78.81 BACTEREMIA: ICD-10-CM

## 2023-06-23 DIAGNOSIS — R10.9 ABDOMINAL PAIN, UNSPECIFIED ABDOMINAL LOCATION: ICD-10-CM

## 2023-06-23 DIAGNOSIS — Z79.2 RECEIVING INTRAVENOUS ANTIBIOTIC TREATMENT AS OUTPATIENT: ICD-10-CM

## 2023-06-23 PROCEDURE — 99215 OFFICE O/P EST HI 40 MIN: CPT | Mod: 24 | Performed by: REGISTERED NURSE

## 2023-06-23 PROCEDURE — 87070 CULTURE OTHR SPECIMN AEROBIC: CPT | Performed by: REGISTERED NURSE

## 2023-06-23 PROCEDURE — 99213 OFFICE O/P EST LOW 20 MIN: CPT | Performed by: REGISTERED NURSE

## 2023-06-23 PROCEDURE — 99417 PROLNG OP E/M EACH 15 MIN: CPT | Performed by: REGISTERED NURSE

## 2023-06-23 PROCEDURE — 87077 CULTURE AEROBIC IDENTIFY: CPT | Mod: 59 | Performed by: REGISTERED NURSE

## 2023-06-23 ASSESSMENT — PAIN SCALES - GENERAL: PAINLEVEL: MODERATE PAIN (4)

## 2023-06-23 NOTE — TELEPHONE ENCOUNTER
"Messaged [facility] to see if they can supply patient with supplement:    [employer] TF 20 protein supplement: 1 packet twice per day  - TF + [employer]: 1600 kcal/day ( 27 kcal/kg) + 104 gm protein/day ( 1.7 gm/kg).        Patient saw ID today, concern for infection at the J site, some granulation and bleeding, \"uncomfortable and bloats out\", increased dressing changes, now TID, sometimes \"green/goopy/blood stained\" ID did cultures today, is now off antibiotics      Feeds are up to 70ml/hr, can be off for 8 hours at a time.     Has planned follow up with Dr. Park Monday 6/26, update sent to Dr. Park    "

## 2023-06-23 NOTE — PROGRESS NOTES
Oncology Distress Screening Follow-up  Clinical Social Work  [facility]    Identified Concern and Score From Distress Screenin. How concerned are you about your ability to eat?  8 Abnormal        2. How concerned are you about unintended weight loss or your current weight?  7  Weight loss       3. How concerned are you about feeling depressed or very sad?  5       4. How concerned are you about feeling anxious or very scared?  5       5. Do you struggle with the loss of meaning and herminio in your life?  Quite a bit       6. How concerned are you about work and home life issues that may be affected by your cancer?  10 Abnormal        7. How concerned are you about knowing what resources are available to help you?  8 Abnormal        8. Do you currently have what you would describe as Amish or spiritual struggles? A great deal Abnormal          Date of Distress Screenin23    Intervention:   Dinora is a 58-year-old woman with multiple medical conditions who had visit with palliative care  Sweta Ghosh 23. Dinora received oncology distress screen, despite not having cancer diagnosis.     Dinora had visit same day with therapeutic provider, Sweta Ghosh, who was able to address psychosocial concerns and needs.     Follow-up Required:   No oncology social work follow-up planned as Dinora does not have a cancer diagnosis.     Fernanda Corbett, MSW, LICSW, OSW-C  Clinical - Adult Oncology  She/Her/Hers  Phone: [phone]  [facility]: M, Thu  *every other Tue, 8am-4:30pm  [facility]: W, F, *every other Tue, 8am-4:30pm

## 2023-06-23 NOTE — LETTER
6/23/2023       RE: Dinora Mcghee  [address] [place] [address]     Dear Colleague,    Thank you for referring your patient, Dinora Mcghee, to the [facility] INFECTIOUS DISEASE CLINIC [facility] at [facility]. Please see a copy of my visit note below.    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    Problem List:  1. Klebsiella pneumoniae bacteremia  2. Port-a-cath in place   3. TPN use  4. G-tube site  - redness, pain, bleeding, and purulent drainage  5. Acute on chronic left-sided abdominal pain - may be related to G-tube irritation vs infection          Recommendations:  Will plan for test of cure blood cultures to be done on 6/26 (timed one week from finishing antibiotics); will need one set drawn from peripheral site and one from the port  Swabbed G-tube site, unsure relevance of swab results given may have been tube feed liquid  Coordinating with Dr. Park about concerns regarding G-tube   Consideration of   Follow up with ID PRN            I spent 81 minutes as part of a visit on the date of the encounter doing chart review, history and exam, documentation, and care coordination.      Izabel Becker, APRN, CNP  Infectious  Diseases  Pager# [phone] or Pallavi (preferred)          Interval History:   Low grade fevers and bodyaches and nightsweats after discharge home but these have resolved. Had diarrhea but this resolved after finishing antibiotics. Overall feeling better    Still having significant pain around G-tube site, with drainage around insertion site (green or brown) increased about a week ago, over the last week having to change in the middle of the day due to increased drainage. Small bleeding from granulation tissue surrounding the G-tube, granulation tissue has started growing over the last week and now popping out of her insertion. Sharp stabbing intermittent, but then leaves a dull ache through to back. Worse with moving positions and from sitting to standing. Ice and heat help. A little bit worse since hospitalization, more bloating with attempting to eat more foods.    Line: PICC or Port? Well-healed, no redness, pain or drainage    Denies headache, fever, chills, night sweats, fatigue, sore throat, cough, dyspnea, nausea, vomiting, abdominal pain, diarrhea, dysuria, myalgias, arthralgias, lymphadenopathy, acute rash, or new wounds.        History of Present Illness:     See Vicenta Stubbs PA-C's consult note for full HPI: 6/6/23      Infectious Diseases Indication: K.pneumoniae bacteremia     Antibiotic Information  Name of Antibiotic Dose of Antibiotic1 Anticipated duration Effective start date2 End date   ertapenem 1g IV q24hrs 14 days 6/5/23 6/18/23                                      Review of Systems:     12-point ROS performed; pertinent positives and negatives per above         Medications & Allergies:     Current Outpatient Medications   Medication    acetaminophen (TYLENOL) 325 MG/10.15ML solution    amitriptyline (ELAVIL) 10 MG tablet    amylase-lipase-protease (CREON) 32302-60866 units CPEP per EC capsule    blood glucose (PAT CONTOUR NEXT) test strip    blood glucose monitoring (PAT MICROLET) lancets     "cetirizine (ZYRTEC) 10 MG tablet    Continuous Blood Gluc Sensor (FREESTYLE LISA 3 SENSOR) MISC    cyclobenzaprine (FLEXERIL) 10 MG tablet    diphenhydrAMINE (BENADRYL ALLERGY) 25 MG capsule    estradiol (VAGIFEM) 10 MCG TABS vaginal tablet    famotidine (PEPCID) 40 MG/5ML suspension    glucagon 1 MG kit    glucose 40 % GEL gel    ibuprofen (ADVIL/MOTRIN) 400 MG tablet    insulin syringe-needle U-100 (30G X 1/2\" 0.3 ML) 30G X 1/2\" 0.3 ML miscellaneous    levothyroxine (SYNTHROID/LEVOTHROID) 137 MCG tablet    methocarbamol (ROBAXIN) 750 MG tablet    montelukast (SINGULAIR) 10 MG tablet    Multiple Vitamins-Minerals (MULTIVITAMINS W/MINERALS) liquid    Nutritional Supplements (BOOST HIGH PROTEIN) LIQD    pantoprazole (PROTONIX) 40 mg IV push injection    parenteral nutrition - PTA/DISCHARGE ORDER    promethazine 25 mg    promethazine-phenylephrine (PROMETHAZINE VC) 6.25-5 MG/5ML syrup    prucalopride succinate (MOTEGRITY) 2 MG tablet    scopolamine (TRANSDERM) 1 MG/3DAYS 72 hr patch    sennosides (SENOKOT) 8.8 MG/5ML syrup    Sharps Container (BD SHARPS ) MISC    sodium chloride 0.9% SOLN BOLUS    STATIN NOT PRESCRIBED (INTENTIONAL)    thiamine (B-1) 100 MG tablet    UNABLE TO FIND    zinc oxide - white petrolatum (CRITIC-AID THICK MOIST BARRIER) 20-51% PSTE topical paste    ZOLMitriptan (ZOMIG-ZMT) 5 MG ODT     Current Facility-Administered Medications   Medication    Botulinum Toxin Type A (BOTOX) 200 units injection 155 Units         Allergies   Allergen Reactions    Apple Juice Anaphylaxis    Corticosteroids Other (See Comments)     All oral, IV and injectable steroids are contraindicated (unless in life threatening situations) in Islet Auto transplant recipients. They can cause irreversible loss of islet cell function. Please contact patient's transplant care coordinator ADI Gaffney RN at [phone]/pager [phone] and/or endocrinologist prior to administration.      Depakote [Divalproex Sodium] " "Other (See Comments)     Chest pain    Zithromax [Azithromycin Dihydrate] Anaphylaxis     Noted in 4/7/08 ER    Bromfenac      Other reaction(s): Headache    Codeine      Other reaction(s): Hallucinations    Darvocet [Propoxyphene N-Apap] Itching    Morphine Nausea and Vomiting and Rash    Nalbuphine Hcl Rash     RASH :nubaine    Zosyn [Piperacillin-Tazobactam In Dex] Rash     Possible allergy, did have a diffuse rash that seemed drug related but could have also been related to soap in the hospital.     Bactrim [Sulfamethoxazole W-Trimethoprim] Other (See Comments) and Nausea and Vomiting     Severely low liver function.    Statins Other (See Comments)     Previous liver issues, risks vs benefits felt to not warrant statin.  Discussed Oct 2022 visit    Tape [Adhesive Tape] Blisters    Tramadol     Zofran [Ondansetron] Other (See Comments)     migraine    Flagyl [Metronidazole] Hives and Rash            Physical Exam:     /76 (BP Location: Right arm, Patient Position: Sitting, Cuff Size: Adult Regular)   Pulse 113   Wt 60.4 kg (133 lb 4 oz)   SpO2 96%   BMI 20.26 kg/m  \"  Vitals:    06/23/23 0848   Weight: 60.4 kg (133 lb 4 oz)       Physical Examination:  GENERAL:  well-appearing. In no acute distress.  HEAD:  Head is normocephalic, atraumatic   EYES:  Eyes have anicteric sclerae without conjunctival injection.   ENT:  Oropharynx is moist. Tongue is midline.   NECK:  Supple.   LUNGS:  Breathing comfortably on room air. No accessory muscle use.    CARDIOVASCULAR: heart RRR. Skin warm, well-perfused.  Abdomen: G-tube present left abdomen with surrounding erythema, small white drainage deep with tube insertion, able to express small amount, no odor; small amount of friable redundant tissue along G-tube, tenderness and bleeding (see media section for updated photo)  SKIN:  No acute rashes with visible skin.    EXTREMITIES: generalized non-pitting swelling bilateral lower extremities   NEUROLOGIC:  Grossly " nonfocal. Active x4 extremities  LINES: dressing C/D/I, no surrounding erythema or edema, non tender    Microbiology Data   Pertinent Micro:    Culture   Date Value Ref Range Status   06/06/2023 No Growth  Final   06/06/2023 No Growth  Final   06/06/2023 No Growth  Final   06/05/2023 No Growth  Final   06/05/2023 No Growth  Final   06/04/2023 Positive on the 1st day of incubation (A)  Final   06/04/2023 Klebsiella pneumoniae (AA)  Final     Comment:     2 of 2 bottlesSusceptibilities done on previous cultures   06/04/2023 Positive on the 1st day of incubation (A)  Final   06/04/2023 Klebsiella pneumoniae (AA)  Final     Comment:     2 of 2 bottles   05/10/2023 No Growth  Final   05/04/2023 Candida albicans (A)  Final   12/05/2021 2+ Klebsiella pneumoniae (A)  Final   12/05/2021 2+ Klebsiella pneumoniae (A)  Final   12/05/2021 2+ Citrobacter freundii complex (A)  Final   12/05/2021 3+ Streptococcus anginosus (A)  Final     Comment:     This organism is susceptible to ampicillin, penicillin, vancomycin and the cephalosporins. If treatment is required and your patient is allergic to penicillin, contact the microbiology lab within 5 days to request susceptibility testing.   09/23/2021 No Growth  Final   09/23/2021 No Growth  Final   09/19/2021 >100,000 CFU/mL Mixture of urogenital andry  Final       Last check of C difficile  C Diff Toxin B PCR   Date Value Ref Range Status   08/08/2016  NEG Final    Negative  Negative: Clostridium difficile target DNA sequences NOT detected, presumed   negative for Clostridium difficile toxin B or the number of bacteria present   may be below the limit of detection for the test.   FDA approved assay performed using [employer] GeneXpert real-time PCR.   A negative result does not exclude actual disease due to Clostridium difficile   and may be due to improper collection, handling and storage of the specimen or   the number of organisms in the specimen is below the detection limit of the    assay.         Urine Studies     Recent Labs   Lab Test 06/04/23  1624 05/10/23  1812 12/19/22  0700 12/03/21  1412 09/19/21  1808 09/08/21  0314   URINEPH 6.0 7.0 6.0 7.0 5.0 5.5   NITRITE Negative Negative Negative Negative Negative Negative   LEUKEST Negative Large* Negative Negative Moderate* Trace*   WBCU 1 28*  --  <1 10* 3       CSF testing   No lab results found.    Invalid input(s): CADAM, EVPCR, ENTPCR, ENTEROVIRUS    Laboratory Data:   Metabolic Studies       Recent Labs   Lab Test 06/10/23  0706 06/09/23  0551 06/09/23  0445 06/05/23  0703 06/05/23  0702 06/04/23  2231 06/04/23  1553 06/04/23  1348 06/04/23  1343 02/17/22  1244 02/17/22  1115     --  139   < >  --   --   --    < >  --    < > 138   POTASSIUM 4.3  --  4.1   < >  --   --   --    < >  --    < > 4.0   CHLORIDE 103  --  103   < >  --   --   --    < >  --    < > 102   CO2 27  --  26   < >  --   --   --    < >  --    < > 29   ANIONGAP 8  --  10   < >  --   --   --    < >  --    < > 7   BUN 9.1  --  11.7   < >  --   --   --    < >  --    < > 15   CR 0.54  --  0.51   < >  --   --   --    < >  --    < > 0.71   GFRESTIMATED >90  --  >90   < >  --   --   --    < >  --    < > >90   *   < > 100*   < >  --    < >  --    < >  --    < > 132*  132*   A1C  --   --   --   --   --   --   --   --   --   --  5.4   KASSI 8.4*  --  8.8   < >  --   --   --    < >  --    < > 9.6   PHOS 3.3  --  3.5   < > 2.2*  --   --    < >  --    < >  --    MAG 2.0  --  1.9   < >  --    < >  --    < >  --    < >  --    LACT  --   --   --   --   --   --  1.3  --  2.7*   < >  --    PCAL  --   --  0.49*   < >  --   --   --    < >  --   --   --    FGTL  --   --   --   --  <31  --   --   --   --   --   --     < > = values in this interval not displayed.       Hepatic Studies    Recent Labs   Lab Test 06/10/23  0706 06/09/23  0445 06/08/23  0706 06/04/23  1348 05/10/23  0719   BILITOTAL 0.4 0.4 0.4   < > 0.5   DBIL  --   --   --   --  <0.20   ALKPHOS 162* 154* 174*   < >  125*   PROTTOTAL 5.9* 5.8* 6.2*   < > 7.4   ALBUMIN 3.5 3.3* 3.5   < > 4.3   AST 40* 31 30   < > 43*   ALT 99* 101* 133*   < > 67*    < > = values in this interval not displayed.       Hematology Studies      Recent Labs   Lab Test 06/10/23  0706 06/09/23  0445 06/08/23  0706 06/07/23  0707 06/06/23  0549 06/05/23  0702 07/13/21  1731 12/17/20  0739 09/14/20  1529   WBC 8.1 6.5 5.5 4.8 8.8 14.4*   < > 5.0 6.2   ANEU  --   --   --   --   --   --   --  1.5* 2.6   ALYM  --   --   --   --   --   --   --  2.7 2.6   GABRIELE  --   --   --   --   --   --   --  0.5 0.7   AEOS  --   --   --   --   --   --   --  0.3 0.2   HGB 11.5* 11.0* 11.7 11.6* 11.1* 10.9*   < > 13.5 14.3   HCT 34.9* 34.0* 36.7 36.6 34.7* 34.5*   < > 41.4 42.9    302 277 228 184 182   < > 363 353    < > = values in this interval not displayed.       Medication levels  No lab results found.    Invalid input(s): AMIK    Inflammatory Markers    Recent Labs   Lab Test 09/16/21  1308 12/29/16  0620 06/06/15  1903   SED 10  --   --    CRP  --  90.0* 71.0*       Imaging:  Results for orders placed or performed during the hospital encounter of 06/04/23   XR Chest Port 1 View    Narrative    EXAM: XR CHEST PORT 1 VIEW  6/4/2023 5:33 PM     HISTORY:  sepsis, assessing for source       COMPARISON:  9/23/2021    FINDINGS:   AP portable semiupright view of the chest. Right chest wall cydney cath  distal tip in the high SVC. Trachea is midline. Cardiomediastinal  silhouette and pulmonary vasculature are within normal limits. No  focal airspace opacity, pleural effusion or appreciable pneumothorax.    No acute osseous abnormality. Visualized upper abdomen is  unremarkable.        Impression    IMPRESSION:   No acute airspace disease.    I have personally reviewed the examination and initial interpretation  and I agree with the findings.    BOB RIVERA MD         SYSTEM ID:  [id number]   US Abdomen Limited    Addendum: 6/7/2023    Previous final report notes  visualized portions of the pancreatic head  and body are within normal limits. Erroneous finding as the patient  has previously undergone partial pancreatectomy, echogenic material  visualized is likely fat within the lesser sac.    I have personally reviewed the examination and initial interpretation  and I agree with the findings.    CARL MAR MD         SYSTEM ID:  [id number]      Narrative    EXAMINATION: Limited Abdominal Ultrasound, 6/5/2023 10:32 AM     COMPARISON: 5/9/2023, 1/12/2023    HISTORY: Focus to hepatobiliary/RUQ; sepsis      FINDINGS:   Fluid: Equivocal trace perihepatic ascites.  No pleural effusion.    Liver: The liver demonstrates diffusely echogenic and heterogeneous  parenchyma, and is enlarged measuring 18.9 cm in craniocaudal  dimension. There is no focal mass. Patent main portal vein with flow  directed towards liver.    Gallbladder: Cholecystectomy. Normal caliber and intrahepatic biliary  system.    Bile Ducts: The common bile duct measures approximately 2 mm in  diameter, although difficult to visualize with ultrasound.    Pancreas: Visualized portions of the head and body of the pancreas are  unremarkable.     Kidney: The right kidney measures 12.2 cm long. There is no  hydronephrosis, mass lesion or obstructing calculus. Right kidney  cortex is diffusely prominent and echogenic.      Impression    IMPRESSION:   1.  Sonographic evidence of intrinsic hepatic parenchymal disease  including hepatic steatosis.  No focal liver lesion. Equivocal trace  perihepatic ascites.     2.  Echogenic right kidney compatible with medical renal disease.  3. Cholecystectomy.  Common bile duct measures approximately 2 mm in  diameter, although difficult to visualize with ultrasound.       I have personally reviewed the examination and initial interpretation  and I agree with the findings.    CATALINA PAYNE MD         SYSTEM ID:  [id number]   MR Abdomen MRCP w/o & w Contrast    Narrative    MRI  ABDOMEN    CLINICAL HISTORY:  History of biliary obstruction status post  cholecystectomy, ERCP/stent, admitted with sepsis, exclude cholangitis    TECHNIQUE:  Images were acquired with and without intravenous contrast  through the abdomen. The following MR images were acquired: TrueFISP,  multiplanar T2 weighted, axial T1 in/out of phase, axial fat-saturated  T1, diffusion-weighted. Multiplanar T1-weighted images with fat  saturation were before contrast administration and at multiple time  points following the administration of intravenous contrast. Contrast  dose: 6 mL gadavist    FINDINGS:    Comparison study: CT 5/19/2023, 1/12/2023    Liver: Heterogeneous enhancement of the liver on the arterial phase  with mild periportal edema. No focal hepatic mass. Homogenous  enhancement of the liver parenchyma on the portal venous and delayed  phases. No hepatic steatosis or evidence of iron overload. The common  bile duct and intrahepatic biliary system do not appear dilated.  Somewhat narrowed appearance of the common bile duct near the  bifurcation without obvious obstructing mass. Postcontrast, the  biliary tree does not appear significantly hyperemic and no definite  thickening of the common bile duct is appreciated.     Gallbladder: Cholecystectomy.    Spleen: Splenectomy.    Kidneys: No focal mass. No hydronephrosis.    Adrenal glands: Unremarkable.    Pancreas: Pancreatectomy.    Bowel: Percutaneous gastrostomy and jejunostomy tubes. No obstruction.    Lymph nodes: Unremarkable.    Blood vessels: The aorta and portal vein are patent.    Lung bases: Small bilateral pleural effusions.    Bones and soft tissues: Hemangioma suspected in the L2 vertebral body  on the right, series 4 image 17, stable from prior CT.    Mesentery and abdominal wall: Minor body wall edema.    Ascites: Small volume ascites.      Impression    IMPRESSION:  1. The common bile duct appears somewhat narrowed near the  bifurcation, however  there is no upstream intrahepatic biliary  dilatation to suggest obstruction. No definite findings to suggest  acute cholangitis.  2. Heterogeneous enhancement of the liver on arterial phase sequence  with associated periportal edema. Findings are nonspecific but can be  seen with acute hepatitis. Alternatively, periportal edema may be due  to overall third spacing of fluid with ascites and some body wall  edema also noted. Correlation with liver function tests.  3. Small volume ascites.    I have personally reviewed the examination and initial interpretation  and I agree with the findings.    MACRINA CORRIGAN MD         SYSTEM ID:  [id number]     *Note: Due to a large number of results and/or encounters for the requested time period, some results have not been displayed. A complete set of results can be found in Results Review.

## 2023-06-26 ENCOUNTER — DOCUMENTATION ONLY (OUTPATIENT)
Dept: SPIRITUAL SERVICES | Facility: CLINIC | Age: 58
End: 2023-06-26

## 2023-06-26 ENCOUNTER — PATIENT OUTREACH (OUTPATIENT)
Dept: GASTROENTEROLOGY | Facility: CLINIC | Age: 58
End: 2023-06-26

## 2023-06-26 ENCOUNTER — VIRTUAL VISIT (OUTPATIENT)
Dept: GASTROENTEROLOGY | Facility: CLINIC | Age: 58
End: 2023-06-26
Payer: COMMERCIAL

## 2023-06-26 DIAGNOSIS — R78.81 BACTEREMIA: ICD-10-CM

## 2023-06-26 DIAGNOSIS — Z79.2 ENCOUNTER FOR LONG-TERM (CURRENT) USE OF ANTIBIOTICS: Primary | ICD-10-CM

## 2023-06-26 DIAGNOSIS — Z79.2 RECEIVING INTRAVENOUS ANTIBIOTIC TREATMENT AS OUTPATIENT: ICD-10-CM

## 2023-06-26 DIAGNOSIS — R11.2 NAUSEA AND VOMITING, UNSPECIFIED VOMITING TYPE: Primary | ICD-10-CM

## 2023-06-26 DIAGNOSIS — Z46.59 ENCOUNTER FOR CARE RELATED TO FEEDING TUBE: Primary | ICD-10-CM

## 2023-06-26 PROCEDURE — 99215 OFFICE O/P EST HI 40 MIN: CPT | Mod: VID | Performed by: INTERNAL MEDICINE

## 2023-06-26 NOTE — PROGRESS NOTES
Virtual Visit Details    Type of service:  Video Visit     Originating Location (pt. Location): Home  Distant Location (provider location):  Off-site  Platform used for Video Visit: Emeli

## 2023-06-26 NOTE — LETTER
6/26/2023         RE: Dinora Mcghee  [address] [facility] [id number]        Dear Colleague,    Thank you for referring your patient, Dinora Mcghee, to the [facility] [place]. Please see a copy of my visit note below.      Joined the call at 6/26/2023, 11:22:47 am.  Left the call at 6/26/2023, 12:12:59 pm.  You were on the call for 50 minutes 11 seconds .      Dinora is following up after her admission for Klebsiella bacteremia associated with TPN and direct jejunostomy site infection.  Fortunately she is now able to tolerate 70 cc/h of tube feeding which is allowed her to be off TPN since the admission for sepsis.  We spent a full hour including 53 minutes on the video visit and another 7 minutes reviewing data afterwards.  As usual she is dealing with overall fatigue unwellness difficulty with stamina.  However compared to before she is able to do some activities including attending her her college reunion even minimal participation in golf.  She is struggling with the usual issues with depression over her chronically debilitated and recurrently challenged situation.    1 thing we did discuss is experience with 1 patient post tPA AT in [place] who had refractory hospital admissions for cholangitis and sepsis who ultimately developed C. difficile colitis from all the antibiotics came here for a FMT/I MT about 2 years ago and has had complete resolution of almost all of her symptoms and infections.  I mention that because we are finally moving towards developing an eye and the 4 FMT IMT for patients post TP IAT.  I did mention that to give her some hope for the future.    As far as specifics she did show me her J-tube site and there is obvious tongue of granulation tissue on the inferior side that is tender and bleeds.  I think that can be adequately treated with silver nitrate sticks and hopefully by the visiting nurse so that she does not need to come out.  We  did agreed that its not valuable or appropriate time to change her tube since that is more anesthesia and intervention and would likely not per se improve things    We also reviewed the cultures from the J-tube site that were taken at her last infectious disease visit 3 days ago.  They do show positive Klebsiella Candida Pseudomonas but were not sure of the significance of that.  It is still my suspicion that the source of bacteremia was central line and TPN related.  Its there is no abscess on CT done in the [facility] emergency room right before her admission here and is very unusual to develop bacteremia from a superficial skin infection but anything is possible      Plan  1) phenergan increase to bid IV  2) ask visiting nurse to apply silver nitrate sticks to granulation tissue on J tube site  3) follow-up with us after lab back and silver nitrate done    Past Medical, Surgical, Family, and Social Histories:    Past Medical History:   Diagnosis Date    Allergic rhinitis, cause unspecified     Allergy to other foods     strawberries, apples, celeries, alice, watermelon    Arthritis     left knee    Choledocholithiasis     long after cholecystectomy    Chronic abdominal pain     Chronic constipation     Chronic nausea     Chronic pancreatitis (H)     Degeneration of lumbar or lumbosacral intervertebral disc     Esophageal reflux     w/ hiatal hernia    Gastroparesis     Hiatal hernia     History of pituitary adenoma     s/p resection    Hypothyroidism     Migraines     Mild hyperlipidemia     On tube feeding diet     presence of GJ tube    Pancreatic disease     PD stricture, 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  endometriosis growth    APPENDECTOMY       SECTION      COLONOSCOPY      COLONOSCOPY N/A 2023    Procedure: COLONOSCOPY, WITH POLYPECTOMY AND BIOPSY;  Surgeon: Percy Chamorro MD;  [facility]    ENDOSCOPIC INSERTION TUBE GASTROSTOMY N/A 2021    Procedure: Gastrojejonostomy placement with jejunopexy, PEG tube exchange;  Surgeon: Zackery Montoya MD;  [facility]    ENDOSCOPIC RETROGRADE CHOLANGIOPANCREATOGRAM N/A 2015    Procedure: ENDOSCOPIC RETROGRADE CHOLANGIOPANCREATOGRAM;  Surgeon: Mandeep Park MD;  [facility]    ENDOSCOPIC RETROGRADE CHOLANGIOPANCREATOGRAM N/A 2016    Procedure: COMBINED ENDOSCOPIC RETROGRADE CHOLANGIOPANCREATOGRAPHY, PLACE TUBE/STENT;  Surgeon: Mandeep Park MD;  [facility]    ENDOSCOPIC RETROGRADE CHOLANGIOPANCREATOGRAM N/A 3/17/2016    Procedure: COMBINED ENDOSCOPIC RETROGRADE CHOLANGIOPANCREATOGRAPHY, REMOVE FOREIGN BODY OR STENT/TUBE;  Surgeon: Mandeep Park MD;  [facility]    ENDOSCOPIC RETROGRADE CHOLANGIOPANCREATOGRAM N/A 2016    Procedure: COMBINED ENDOSCOPIC RETROGRADE CHOLANGIOPANCREATOGRAPHY, PLACE TUBE/STENT;  Surgeon: Mandeep Park MD;  [facility]    ENDOSCOPIC RETROGRADE CHOLANGIOPANCREATOGRAM N/A 2016    Procedure: COMBINED ENDOSCOPIC RETROGRADE CHOLANGIOPANCREATOGRAPHY, REMOVE FOREIGN BODY OR STENT/TUBE;  Surgeon: Mandeep Park MD;  [facility]    ENDOSCOPIC ULTRASOUND UPPER GASTROINTESTINAL TRACT (GI) N/A 10/3/2016    Procedure: ENDOSCOPIC ULTRASOUND, ESOPHAGOSCOPY / UPPER GASTROINTESTINAL TRACT (GI);  Surgeon: Guru Jose Rodas MD;  [facility]    ENTEROSCOPY SMALL BOWEL N/A 2/3/2022    Procedure: ENTEROSCOPY with possible fistula closure;  Surgeon: Francisco Dodson MD;  [facility]    ESOPHAGOSCOPY, GASTROSCOPY, DUODENOSCOPY (EGD), COMBINED N/A 2015    Procedure: COMBINED ESOPHAGOSCOPY, GASTROSCOPY, DUODENOSCOPY (EGD),  REMOVE FOREIGN BODY;  Surgeon: Mandeep Park MD;  [facility]    ESOPHAGOSCOPY, GASTROSCOPY, DUODENOSCOPY (EGD), COMBINED N/A 10/25/2015    Procedure: COMBINED ESOPHAGOSCOPY, GASTROSCOPY, DUODENOSCOPY (EGD);  Surgeon: Sammy Amaro MD;  [facility]    ESOPHAGOSCOPY, GASTROSCOPY, DUODENOSCOPY (EGD), COMBINED N/A 10/25/2015    Procedure: COMBINED ESOPHAGOSCOPY, GASTROSCOPY, DUODENOSCOPY (EGD), BIOPSY SINGLE OR MULTIPLE;  Surgeon: Sammy Amaro MD;  [facility]    ESOPHAGOSCOPY, GASTROSCOPY, DUODENOSCOPY (EGD), COMBINED N/A 1/31/2023    Procedure: ESOPHAGOGASTRODUODENOSCOPY (EGD) with peg replacement ;  Surgeon: Mandeep Park MD;  [facility]    ESOPHAGOSCOPY, GASTROSCOPY, DUODENOSCOPY (EGD), DILATATION, COMBINED      EXCISE LESION TRUNK N/A 4/17/2017    Procedure: EXCISE LESION TRUNK;  Removal of Abdominal Foreign Body;  Surgeon: Nestor Phoenix MD;  [facility]    HC ESOPH/GAS REFLUX TEST W NASAL IMPED >1 HR N/A 11/19/2015    Procedure: ESOPHAGEAL IMPEDENCE FUNCTION TEST WITH 24 HOUR PH GREATER THAN 1 HOUR;  Surgeon: Thiago Apple MD;  [facility]    HC UGI ENDOSCOPY DIAG W BIOPSY  9/17/08    HC UGI ENDOSCOPY DIAG W BIOPSY  9/27/12    HC UGI ENDOSCOPY W ESOPHAGEAL DILATION BALLOON <30MM  9/17/08    HC UGI ENDOSCOPY W EUS N/A 5/5/2015    Procedure: COMBINED ENDOSCOPIC ULTRASOUND, ESOPHAGOSCOPY, GASTROSCOPY, DUODENOSCOPY (EGD);  Surgeon: Wm Dueñas MD;  [facility]    HC WRIST ARTHROSCOP,RELEASE XVERS LIG Bilateral 12/17/08    INJECT TRANSVERSUS ABDOMINIS PLANE (TAP) BLOCK BILATERAL Left 9/22/2016    Procedure: INJECT TRANSVERSUS ABDOMINIS PLANE (TAP) BLOCK BILATERAL;  Surgeon: Dickson Corrigan MD;  [facility]    INJECT TRIGGER POINT Bilateral 9/8/2022    Procedure: Abdominal trigger point injection with ultrasound;  Surgeon: Monika Mahajan MD;  [facility]    INJECT TRIGGER POINT SINGLE / MULTIPLE 3 OR MORE MUSCLES Right  11/15/2022    Procedure: Trigger point injections right abdomen with ultrasound guidance;  Surgeon: Monika Mahajan MD;  Location: [facility]    IR CHEST PORT PLACEMENT < 5 YRS OF AGE  6/10/2022    laparoscopic pineda  1995    LAPAROSCOPIC HERNIORRHAPHY INCISIONAL N/A 8/23/2018    Procedure: LAPAROSCOPIC HERNIORRHAPHY INCISIONAL;  Laparoscopic Incisional Hernia Repair with Symbotex Mesh Implant;  Surgeon: Nestor Phoenix MD;  [facility]    LAPAROSCOPIC PANCREATECTOMY, TRANSPLANT AUTO ISLET CELL N/A 12/28/2016    Procedure: LAPAROSCOPIC PANCREATECTOMY, TRANSPLANT AUTO ISLET CELL;  Surgeon: Nestor Phoenix MD;  [facility]    LAPAROTOMY EXPLORATORY N/A 1/31/2023    Procedure: Exploratory Laparotomy, lysis of adhesions, Perforated J-Junostomy Resection, Replace J-Junostomy site;  Surgeon: Elver Bauer MD;  [facility]    LAPAROTOMY, LYSIS ADHESIONS, COMBINED N/A 1/31/2023    Procedure: Dilatation of jejunostomy feeding tube, track with placement of jejunostomy tube with fluoroscopy;  Surgeon: Elver Bauer MD;  [facility]    REMOVE AND REPLACE BREAST IMPLANT PROSTHESIS N/A 12/30/2022    Procedure: Exploratory Laparotomy, lysis of adhesions, small bowel resection. Placement of gastric jejunostomy for enteral feeding.;  Surgeon: Elver Bauer MD;  [facility]    REMOVE GASTROSTOMY TUBE ADULT N/A 1/28/2022    Procedure: REMOVAL, GASTROSTOMY TUBE, ADULT;  Surgeon: Mandeep Park MD;  [facility]    REPLACE GASTROJEJUNOSTOMY TUBE, PERCUTANEOUS N/A 9/7/2021    Procedure: GASTROJEJUNOSTOMY TUBE PLACEMENT, PERCUTANEOUS, WITH GASTROPEXY;  Surgeon: Mandeep Park MD;  [facility]    REPLACE GASTROJEJUNOSTOMY TUBE, PERCUTANEOUS N/A 9/22/2021    Procedure: REPLACEMENT, GASTROJEJUNOSTOMY TUBE, PERCUTANEOUS;  Surgeon: Zackery Montoya MD;  [facility]    REPLACE GASTROJEJUNOSTOMY TUBE, PERCUTANEOUS N/A 11/22/2022    Procedure: REPLACEMENT,  GASTROJEJUNOSTOMY TUBE, PERCUTANEOUS;  Surgeon: Mandeep Park MD;  [facility]    REPLACE GASTROJEJUNOSTOMY TUBE, PERCUTANEOUS N/A 2022    Procedure: REPLACEMENT, GASTROJEJUNOSTOMY TUBE, PERCUTANEOUS with ENDOSCOPIC SUTURING.;  Surgeon: Mandeep Park MD;  [facility]    REPLACE JEJUNOSTOMY TUBE, PERCUTANEOUS N/A 9/10/2021    Procedure: UPPER ENDOSCOPY, REPLACEMENT OF PERCUTANEOUS GASTROJEJUNOSTOMY TUBE, T-TAG GASTROPEXY;  Surgeon: Zackery Montoya MD;  [facility]    REPLACE JEJUNOSTOMY TUBE, PERCUTANEOUS N/A 2021    Procedure: REPLACEMENT, JEJUNOSTOMY TUBE, PERCUTANEOUS;  Surgeon: Mandeep Park MD;  [facility]    REPLACE JEJUNOSTOMY TUBE, PERCUTANEOUS N/A 2023    Procedure: REPLACEMENT, JEJUNOSTOMY TUBE, PERCUTANEOUS;  Surgeon: Mandeep Park MD;  [facility]    transphenoidal pituitary resection      Z  DELIVERY ONLY      Z  DELIVERY ONLY      repeat c section with incidental cystotomy with repair    [facility] EXCIS PITUITARY,TRANSNASAL/SEPTAL      pituitary tumor removed for diabetes insipidus    [facility] TOTAL ABDOM HYSTERECTOMY      w/ bilateral salpingoophorectomy        Family History   Problem Relation Age of Onset    Lipids Mother     Hypertension Mother     Thyroid Disease Mother     Depression Mother     Angina Mother     GERD Mother     Skin Cancer Mother     Migraines Mother     Autoimmune Disease Mother     Hyperlipidemia Mother     Mental Illness Mother     Eye Disorder Father         cataract, detached retina    Myocardial Infarction Father 60    Lipids Father     Cerebrovascular Disease Father     Depression Father     Substance Abuse Father     Anesthesia Reaction Father         stroke right after surgery    Cataracts Father     Osteoarthritis Father     Ulcerative Colitis Father     Autoimmune Disease Father     Heart Disease Father     Hyperlipidemia Father     Mental Illness Father      Interpersonal Violence Sister     Coronary Artery Disease Sister         angioplasty    GERD Sister     Substance Abuse Sister     Depression Sister     Thyroid Disease Sister     Eye Disorder Maternal Grandmother         cataract    Thyroid Disease Maternal Grandmother     Diabetes Maternal Grandfather     Eye Disorder Paternal Grandmother         cataract    Diabetes Paternal Grandmother     Eye Disorder Paternal Grandfather         cataract    Diabetes Paternal Grandfather     Substance Abuse Paternal Grandfather     Eye Disorder Son         ptosis    Depression Son     Anxiety Disorder Son     Heart Disease Paternal Aunt     Diabetes Paternal Aunt     Diabetes Paternal Uncle     Heart Disease Paternal Uncle     Depression Nephew     Anxiety Disorder Nephew     Thyroid Disease Nephew     Diabetes Type 2  Cousin         paternal cousin    Autoimmune Disease Cousin     Autoimmune Disease Sister     Depression Sister     Mental Illness Sister     Substance Abuse Sister     Thyroid Disease Sister     Depression Son     Mental Illness Son     Thyroid Disease Nephew        Social History     Tobacco Use    Smoking status: Former     Packs/day: 0.50     Years: 6.00     Pack years: 3.00     Types: Cigarettes     Start date: 1985     Quit date: 1992     Years since quittin.5    Smokeless tobacco: Not on file    Tobacco comments:     no 2nd hand   Substance Use Topics    Alcohol use: Not Currently     Alcohol/week: 3.0 - 6.0 standard drinks of alcohol     Types: 1 - 2 Glasses of wine, 1 - 2 Cans of beer, 1 - 2 Shots of liquor per week     Comment: none since IGG     Again, thank you for allowing me to participate in the care of your patient.      Sincerely,    Mandeep Park MD

## 2023-06-26 NOTE — PROGRESS NOTES
Joined the call at 6/26/2023, 11:22:47 am.  Left the call at 6/26/2023, 12:12:59 pm.  You were on the call for 50 minutes 11 seconds .      Dinora is following up after her admission for Klebsiella bacteremia associated with TPN and direct jejunostomy site infection.  Fortunately she is now able to tolerate 70 cc/h of tube feeding which is allowed her to be off TPN since the admission for sepsis.  We spent a full hour including 53 minutes on the video visit and another 7 minutes reviewing data afterwards.  As usual she is dealing with overall fatigue unwellness difficulty with stamina.  However compared to before she is able to do some activities including attending her her college reunion even minimal participation in golf.  She is struggling with the usual issues with depression over her chronically debilitated and recurrently challenged situation.    1 thing we did discuss is experience with 1 patient post tPA AT in [place] who had refractory hospital admissions for cholangitis and sepsis who ultimately developed C. difficile colitis from all the antibiotics came here for a FMT/I MT about 2 years ago and has had complete resolution of almost all of her symptoms and infections.  I mention that because we are finally moving towards developing an eye and the 4 FMT IMT for patients post TP IAT.  I did mention that to give her some hope for the future.    As far as specifics she did show me her J-tube site and there is obvious tongue of granulation tissue on the inferior side that is tender and bleeds.  I think that can be adequately treated with silver nitrate sticks and hopefully by the visiting nurse so that she does not need to come out.  We did agreed that its not valuable or appropriate time to change her tube since that is more anesthesia and intervention and would likely not per se improve things    We also reviewed the cultures from the J-tube site that were taken at her last infectious disease visit 3  days ago.  They do show positive Klebsiella Candida Pseudomonas but were not sure of the significance of that.  It is still my suspicion that the source of bacteremia was central line and TPN related.  Its there is no abscess on CT done in the [facility] emergency room right before her admission here and is very unusual to develop bacteremia from a superficial skin infection but anything is possible      Plan  1) phenergan increase to bid IV  2) ask visiting nurse to apply silver nitrate sticks to granulation tissue on J tube site  3) follow-up with us after lab back and silver nitrate done    Past Medical, Surgical, Family, and Social Histories:    Past Medical History:   Diagnosis Date     Allergic rhinitis, cause unspecified      Allergy to other foods     strawberries, apples, celeries, alice, watermelon     Arthritis     left knee     Choledocholithiasis     long after cholecystectomy     Chronic abdominal pain      Chronic constipation      Chronic nausea      Chronic pancreatitis (H)      Degeneration of lumbar or lumbosacral intervertebral disc      Esophageal reflux     w/ hiatal hernia     Gastroparesis      Hiatal hernia      History of pituitary adenoma     s/p resection     Hypothyroidism      Migraines      Mild hyperlipidemia      On tube feeding diet     presence of GJ tube     Pancreatic disease     PD stricture, 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 endometriosis growth     APPENDECTOMY        SECTION       COLONOSCOPY       COLONOSCOPY N/A 2023    Procedure: COLONOSCOPY, WITH POLYPECTOMY AND BIOPSY;  Surgeon: Percy Chamorro MD;  [facility]     ENDOSCOPIC INSERTION  TUBE GASTROSTOMY N/A 11/28/2021    Procedure: Gastrojejonostomy placement with jejunopexy, PEG tube exchange;  Surgeon: Zackery Montoya MD;  [facility]     ENDOSCOPIC RETROGRADE CHOLANGIOPANCREATOGRAM N/A 6/2/2015    Procedure: ENDOSCOPIC RETROGRADE CHOLANGIOPANCREATOGRAM;  Surgeon: Mandeep Park MD;  [facility]     ENDOSCOPIC RETROGRADE CHOLANGIOPANCREATOGRAM N/A 2/9/2016    Procedure: COMBINED ENDOSCOPIC RETROGRADE CHOLANGIOPANCREATOGRAPHY, PLACE TUBE/STENT;  Surgeon: Mandeep Park MD;  [facility]     ENDOSCOPIC RETROGRADE CHOLANGIOPANCREATOGRAM N/A 3/17/2016    Procedure: COMBINED ENDOSCOPIC RETROGRADE CHOLANGIOPANCREATOGRAPHY, REMOVE FOREIGN BODY OR STENT/TUBE;  Surgeon: Mandeep Park MD;  [facility]     ENDOSCOPIC RETROGRADE CHOLANGIOPANCREATOGRAM N/A 8/2/2016    Procedure: COMBINED ENDOSCOPIC RETROGRADE CHOLANGIOPANCREATOGRAPHY, PLACE TUBE/STENT;  Surgeon: Mandeep Park MD;  [facility]     ENDOSCOPIC RETROGRADE CHOLANGIOPANCREATOGRAM N/A 8/26/2016    Procedure: COMBINED ENDOSCOPIC RETROGRADE CHOLANGIOPANCREATOGRAPHY, REMOVE FOREIGN BODY OR STENT/TUBE;  Surgeon: Mandeep Park MD;  [facility]     ENDOSCOPIC ULTRASOUND UPPER GASTROINTESTINAL TRACT (GI) N/A 10/3/2016    Procedure: ENDOSCOPIC ULTRASOUND, ESOPHAGOSCOPY / UPPER GASTROINTESTINAL TRACT (GI);  Surgeon: Guru Jose Rodas MD;  [facility]     ENTEROSCOPY SMALL BOWEL N/A 2/3/2022    Procedure: ENTEROSCOPY with possible fistula closure;  Surgeon: Francisco Dodson MD;  Location:  GI     ESOPHAGOSCOPY, GASTROSCOPY, DUODENOSCOPY (EGD), COMBINED N/A 6/24/2015    Procedure: COMBINED ESOPHAGOSCOPY, GASTROSCOPY, DUODENOSCOPY (EGD), REMOVE FOREIGN BODY;  Surgeon: Mandeep Park MD;  [facility]     ESOPHAGOSCOPY, GASTROSCOPY, DUODENOSCOPY (EGD), COMBINED N/A 10/25/2015    Procedure: COMBINED ESOPHAGOSCOPY, GASTROSCOPY, DUODENOSCOPY (EGD);  Surgeon:  Sammy Amaro MD;  [facility]     ESOPHAGOSCOPY, GASTROSCOPY, DUODENOSCOPY (EGD), COMBINED N/A 10/25/2015    Procedure: COMBINED ESOPHAGOSCOPY, GASTROSCOPY, DUODENOSCOPY (EGD), BIOPSY SINGLE OR MULTIPLE;  Surgeon: Sammy Amaro MD;  [facility]     ESOPHAGOSCOPY, GASTROSCOPY, DUODENOSCOPY (EGD), COMBINED N/A 1/31/2023    Procedure: ESOPHAGOGASTRODUODENOSCOPY (EGD) with peg replacement ;  Surgeon: Mandeep Park MD;  [facility]     ESOPHAGOSCOPY, GASTROSCOPY, DUODENOSCOPY (EGD), DILATATION, COMBINED       EXCISE LESION TRUNK N/A 4/17/2017    Procedure: EXCISE LESION TRUNK;  Removal of Abdominal Foreign Body;  Surgeon: Nestor Phoenix MD;  [facility]     HC ESOPH/GAS REFLUX TEST W NASAL IMPED >1 HR N/A 11/19/2015    Procedure: ESOPHAGEAL IMPEDENCE FUNCTION TEST WITH 24 HOUR PH GREATER THAN 1 HOUR;  Surgeon: Thiago Apple MD;  [facility]     HC UGI ENDOSCOPY DIAG W BIOPSY  9/17/08     HC UGI ENDOSCOPY DIAG W BIOPSY  9/27/12     HC UGI ENDOSCOPY W ESOPHAGEAL DILATION BALLOON <30MM  9/17/08     HC UGI ENDOSCOPY W EUS N/A 5/5/2015    Procedure: COMBINED ENDOSCOPIC ULTRASOUND, ESOPHAGOSCOPY, GASTROSCOPY, DUODENOSCOPY (EGD);  Surgeon: Wm Dueñas MD;  [facility]     HC WRIST ARTHROSCOP,RELEASE XVERS LIG Bilateral 12/17/08     INJECT TRANSVERSUS ABDOMINIS PLANE (TAP) BLOCK BILATERAL Left 9/22/2016    Procedure: INJECT TRANSVERSUS ABDOMINIS PLANE (TAP) BLOCK BILATERAL;  Surgeon: Dickson Corrigan MD;  [facility]     INJECT TRIGGER POINT Bilateral 9/8/2022    Procedure: Abdominal trigger point injection with ultrasound;  Surgeon: Monika Mahajan MD;  [facility]     INJECT TRIGGER POINT SINGLE / MULTIPLE 3 OR MORE MUSCLES Right 11/15/2022    Procedure: Trigger point injections right abdomen with ultrasound guidance;  Surgeon: Monika Mahajan MD;  [facility]     IR CHEST PORT PLACEMENT < 5 YRS OF AGE  6/10/2022     laparoscopic pineda  1995      LAPAROSCOPIC HERNIORRHAPHY INCISIONAL N/A 8/23/2018    Procedure: LAPAROSCOPIC HERNIORRHAPHY INCISIONAL;  Laparoscopic Incisional Hernia Repair with Symbotex Mesh Implant;  Surgeon: Nestor Phoenix MD;  [facility]     LAPAROSCOPIC PANCREATECTOMY, TRANSPLANT AUTO ISLET CELL N/A 12/28/2016    Procedure: LAPAROSCOPIC PANCREATECTOMY, TRANSPLANT AUTO ISLET CELL;  Surgeon: Nestor Phoenix MD;  [facility]     LAPAROTOMY EXPLORATORY N/A 1/31/2023    Procedure: Exploratory Laparotomy, lysis of adhesions, Perforated J-Junostomy Resection, Replace J-Junostomy site;  Surgeon: Elver Bauer MD;  [facility]     LAPAROTOMY, LYSIS ADHESIONS, COMBINED N/A 1/31/2023    Procedure: Dilatation of jejunostomy feeding tube, track with placement of jejunostomy tube with fluoroscopy;  Surgeon: Elver Bauer MD;  [facility]     REMOVE AND REPLACE BREAST IMPLANT PROSTHESIS N/A 12/30/2022    Procedure: Exploratory Laparotomy, lysis of adhesions, small bowel resection. Placement of gastric jejunostomy for enteral feeding.;  Surgeon: Elver Bauer MD;  [facility]     REMOVE GASTROSTOMY TUBE ADULT N/A 1/28/2022    Procedure: REMOVAL, GASTROSTOMY TUBE, ADULT;  Surgeon: Mandeep Park MD;  [facility]     REPLACE GASTROJEJUNOSTOMY TUBE, PERCUTANEOUS N/A 9/7/2021    Procedure: GASTROJEJUNOSTOMY TUBE PLACEMENT, PERCUTANEOUS, WITH GASTROPEXY;  Surgeon: Mandeep Park MD;  [facility]     REPLACE GASTROJEJUNOSTOMY TUBE, PERCUTANEOUS N/A 9/22/2021    Procedure: REPLACEMENT, GASTROJEJUNOSTOMY TUBE, PERCUTANEOUS;  Surgeon: Zackery Montoya MD;  [facility]     REPLACE GASTROJEJUNOSTOMY TUBE, PERCUTANEOUS N/A 11/22/2022    Procedure: REPLACEMENT, GASTROJEJUNOSTOMY TUBE, PERCUTANEOUS;  Surgeon: Mandeep Park MD;  [facility]     REPLACE GASTROJEJUNOSTOMY TUBE, PERCUTANEOUS N/A 12/9/2022    Procedure: REPLACEMENT, GASTROJEJUNOSTOMY TUBE, PERCUTANEOUS with  ENDOSCOPIC SUTURING.;  Surgeon: Mandeep Park MD;  [facility]     REPLACE JEJUNOSTOMY TUBE, PERCUTANEOUS N/A 9/10/2021    Procedure: UPPER ENDOSCOPY, REPLACEMENT OF PERCUTANEOUS GASTROJEJUNOSTOMY TUBE, T-TAG GASTROPEXY;  Surgeon: Zackery Montoya MD;  [facility]     REPLACE JEJUNOSTOMY TUBE, PERCUTANEOUS N/A 2021    Procedure: REPLACEMENT, JEJUNOSTOMY TUBE, PERCUTANEOUS;  Surgeon: Mandeep Park MD;  [facility]     REPLACE JEJUNOSTOMY TUBE, PERCUTANEOUS N/A 2023    Procedure: REPLACEMENT, JEJUNOSTOMY TUBE, PERCUTANEOUS;  Surgeon: Mandeep Park MD;  [facility]     transphenoidal pituitary resection       Z  DELIVERY ONLY       Z  DELIVERY ONLY      repeat c section with incidental cystotomy with repair     Z EXCIS PITUITARY,TRANSNASAL/SEPTAL      pituitary tumor removed for diabetes insipidus     Z TOTAL ABDOM HYSTERECTOMY      w/ bilateral salpingoophorectomy        Family History   Problem Relation Age of Onset     Lipids Mother      Hypertension Mother      Thyroid Disease Mother      Depression Mother      Angina Mother      GERD Mother      Skin Cancer Mother      Migraines Mother      Autoimmune Disease Mother      Hyperlipidemia Mother      Mental Illness Mother      Eye Disorder Father         cataract, detached retina     Myocardial Infarction Father 60     Lipids Father      Cerebrovascular Disease Father      Depression Father      Substance Abuse Father      Anesthesia Reaction Father         stroke right after surgery     Cataracts Father      Osteoarthritis Father      Ulcerative Colitis Father      Autoimmune Disease Father      Heart Disease Father      Hyperlipidemia Father      Mental Illness Father      Interpersonal Violence Sister      Coronary Artery Disease Sister         angioplasty     GERD Sister      Substance Abuse Sister      Depression Sister      Thyroid Disease Sister      Eye  Disorder Maternal Grandmother         cataract     Thyroid Disease Maternal Grandmother      Diabetes Maternal Grandfather      Eye Disorder Paternal Grandmother         cataract     Diabetes Paternal Grandmother      Eye Disorder Paternal Grandfather         cataract     Diabetes Paternal Grandfather      Substance Abuse Paternal Grandfather      Eye Disorder Son         ptosis     Depression Son      Anxiety Disorder Son      Heart Disease Paternal Aunt      Diabetes Paternal Aunt      Diabetes Paternal Uncle      Heart Disease Paternal Uncle      Depression Nephew      Anxiety Disorder Nephew      Thyroid Disease Nephew      Diabetes Type 2  Cousin         paternal cousin     Autoimmune Disease Cousin      Autoimmune Disease Sister      Depression Sister      Mental Illness Sister      Substance Abuse Sister      Thyroid Disease Sister      Depression Son      Mental Illness Son      Thyroid Disease Nephew        Social History     Tobacco Use     Smoking status: Former     Packs/day: 0.50     Years: 6.00     Pack years: 3.00     Types: Cigarettes     Start date: 1985     Quit date: 1992     Years since quittin.5     Smokeless tobacco: Not on file     Tobacco comments:     no 2nd hand   Substance Use Topics     Alcohol use: Not Currently     Alcohol/week: 3.0 - 6.0 standard drinks of alcohol     Types: 1 - 2 Glasses of wine, 1 - 2 Cans of beer, 1 - 2 Shots of liquor per week     Comment: none since IGG

## 2023-06-26 NOTE — TELEPHONE ENCOUNTER
Clinic with Dr. Park 6/26/23       Plan  1) phenergan increase to bid IV- home care has orders per patient     2) ask visiting nurse to apply silver nitrate sticks to granulation tissue on J tube site-     3) follow-up with us after lab back and silver nitrate done  [facility])  [facility],  [place] [address]   Phone: [phone]  Fax: [phone]    Called Home Care to discuss silver nitrate to jtube site. Left message     Per ID plan, labs ordered and drawn.   Jtube site culture likely skin contaminate andry    Recommendations:  1. Will plan for test of cure blood cultures to be done on 6/26 (timed one week from finishing antibiotics); will need one set drawn from peripheral site and one from the port  2. Swabbed G-tube site, unsure relevance of swab results given may have been tube feed liquid  3. Coordinating with Dr. Park about concerns regarding G-tube   4. Consideration of   5. Follow up with ID PRN

## 2023-06-26 NOTE — TELEPHONE ENCOUNTER
SPIRITUAL HEALTH SERVICES  Documentation Only - Oncology Distress Screening Follow-Up  [facility]    Reason for Contact: This author was referred to contact pt Dinora because she recently scored positive on the oncology distress screen for potential spiritual distress.    Intervention: Documentation reviewed. In consultation with Palliative , Sweta Ghosh, determined psychosocial support and meaning-based work is being provided to Dinora by Sweta at this time. Sweta is aware of my availability and will reach out as needs arise.    Plan: Spiritual Health remains available, as needed.    Yoselyn Quintero  Associate   Phone [phone]

## 2023-06-26 NOTE — NURSING NOTE
Is the patient currently in the state of [place]? YES    Visit mode:VIDEO    If the visit is dropped, the patient can be reconnected by: VIDEO VISIT: Send to e-mail at: [email or web address]    Will anyone else be joining the visit? NO      How would you like to obtain your AVS? MyChart    Are changes needed to the allergy or medication list? NO    Reason for visit: RECHECK

## 2023-06-27 ENCOUNTER — TELEPHONE (OUTPATIENT)
Dept: GASTROENTEROLOGY | Facility: CLINIC | Age: 58
End: 2023-06-27

## 2023-06-27 ENCOUNTER — LAB (OUTPATIENT)
Dept: LAB | Facility: CLINIC | Age: 58
End: 2023-06-27
Payer: COMMERCIAL

## 2023-06-27 ENCOUNTER — DOCUMENTATION ONLY (OUTPATIENT)
Dept: INTERNAL MEDICINE | Facility: CLINIC | Age: 58
End: 2023-06-27

## 2023-06-27 DIAGNOSIS — Z46.59 ENCOUNTER FOR CARE RELATED TO FEEDING TUBE: ICD-10-CM

## 2023-06-27 DIAGNOSIS — T82.594A: Primary | ICD-10-CM

## 2023-06-27 LAB
BACTERIA ABSC ANAEROBE+AEROBE CULT: ABNORMAL

## 2023-06-27 PROCEDURE — 87040 BLOOD CULTURE FOR BACTERIA: CPT | Performed by: REGISTERED NURSE

## 2023-06-27 PROCEDURE — 99000 SPECIMEN HANDLING OFFICE-LAB: CPT | Performed by: PATHOLOGY

## 2023-06-27 NOTE — NURSING NOTE
Chief Complaint   Patient presents with     Labs Only     Labs drawn via  by RN.      Lab order was for cultures to be collected from patient's port. Patient came with port accessed. Two RN's attempted to get blood return with out success. Port was re accessed by RN with 3/4 inch, 20 g power needle. Line flushed without resistance but still with no blood return.     Provider Izabel Becker CNP, was notified and gave ok to get 2nd set of cultures peripherally from opposite arm. Per provider, her team will help set the patient up for alteplase. Patient was also instructed to reach out to team on MyChart to request a new appointment for potentially alteplase and cultures to be drawn from port. Patient was in agreement with plan.    Cultures collected from pts right arm via venipuncture by RN.     Taj Sweet RN

## 2023-06-27 NOTE — TELEPHONE ENCOUNTER
GIANNAM, sent mychart to reschedule the following appointment:      Appt on 9/6/23 with Dr. Genao due to the provider being unavailable. Reschedule with the same provider, next available appt. Left call center #

## 2023-06-27 NOTE — PROGRESS NOTES
Type of Form Received:     Form Received (Date) 6/27/23   Form Filled out Yes, 06/28/23   Placed in provider folder Yes

## 2023-06-28 ENCOUNTER — DOCUMENTATION ONLY (OUTPATIENT)
Dept: GASTROENTEROLOGY | Facility: CLINIC | Age: 58
End: 2023-06-28
Payer: COMMERCIAL

## 2023-06-28 DIAGNOSIS — Z53.9 DIAGNOSIS NOT YET DEFINED: Primary | ICD-10-CM

## 2023-06-28 NOTE — PROGRESS NOTES
Faxed urgent TPA orders to [facility].    Medication ordered by Dr. Mandeep Park:   alteplase (CATHFLO ACTIVASE) injection 2 mg     Facility Information:  [facility] ([facility])   Fax #: [phone]       SK

## 2023-06-29 ENCOUNTER — PATIENT OUTREACH (OUTPATIENT)
Dept: GASTROENTEROLOGY | Facility: CLINIC | Age: 58
End: 2023-06-29
Payer: COMMERCIAL

## 2023-06-29 DIAGNOSIS — T82.594A: ICD-10-CM

## 2023-06-29 DIAGNOSIS — R78.81 BACTEREMIA: Primary | ICD-10-CM

## 2023-06-29 NOTE — TELEPHONE ENCOUNTER
Reached out to [facility] to follow up on orders for TPA    [facility] ([facility])  [address],  [place] [address]   Phone: [phone]    Confirmed with Julianna, she will see patient at 1pm today and will TPA port if meds have arrived to patient. Discussed that orders were also placed for silver nitrate and sent to her preferred pharmacy.     Called patient with update. Placed orders for central line culture per ID    ML

## 2023-06-29 NOTE — TELEPHONE ENCOUNTER
TPA reordered so it can be sent to  pharmacy    Silver nitrate only given out w/ 100 sticks, cost is high. Pt wonders if there's an alternative.  Called home care for recommendations:    [facility] ([facility])  [facility] [address],  [place] [address]   Phone: [phone]    Julianna has no other suggestions for liquid for granulation removal. She will see patient today for silver nitrate application.     Ordered TPA to [facility] to see if patient could get it faster that way- they do not have medication, order cancelled after speaking with staff.     [id number] - [place], [place] - [address]  [address]  [facility] [address]  Phone: [phone] Fax: [phone]    Patient will have TPA delivered by speciality pharmacy and will be administered by [facility]

## 2023-06-30 ENCOUNTER — VIRTUAL VISIT (OUTPATIENT)
Dept: EDUCATION SERVICES | Facility: CLINIC | Age: 58
End: 2023-06-30
Payer: COMMERCIAL

## 2023-06-30 DIAGNOSIS — E16.2 HYPOGLYCEMIA: Primary | ICD-10-CM

## 2023-06-30 PROCEDURE — 99207 PR NO BILLABLE SERVICE THIS VISIT: CPT | Mod: VID

## 2023-06-30 NOTE — NURSING NOTE
Is the patient currently in the state of [place]? YES    Visit mode:VIDEO    If the visit is dropped, the patient can be reconnected by: VIDEO VISIT: Text to cell phone: [phone]    Will anyone else be joining the visit? NO      How would you like to obtain your AVS? MyChart    Are changes needed to the allergy or medication list? NO    Reason for visit: RECHECK and Video Visit

## 2023-06-30 NOTE — PROGRESS NOTES
Virtual Visit Details    Type of service:  Video Visit     Originating Location (pt. Location): Home    Distant Location (provider location):  On-site  Platform used for Video Visit: [employer]     Diabetes Self-Management Education & Support Virtual Visit---Short    Dinora Mcghee contacted today for education related to TPAIT diabetes.  Patient is being treated with:  glucagon    Year of Diagnosis: 2016  Referring Provider: Erum Melissa MD      Purpose of today's visit:  Connect her to our patient portal      Subjective/Objective:            Assessment/Plan:  Dinora is having low blood sugar and is micro-dosing up to 4 times a day with glucagon.  She continues to struggle with low blood sugar.  Her providers could not see her glucose data.  She has two accounts in [employer] and the one hooked to [employer] is Dinora Loo.  The other profile does not have an apostrophe in it.    Will send this information to her doctor.    Any diabetes medication dose changes were made via the CDE Protocol and Collaborative Practice Agreement. A copy of this encounter was provided to the patient's referring provider.      Time spent in this visit:  No charge

## 2023-07-05 ENCOUNTER — LAB (OUTPATIENT)
Dept: LAB | Facility: CLINIC | Age: 58
End: 2023-07-05
Attending: REGISTERED NURSE
Payer: COMMERCIAL

## 2023-07-05 DIAGNOSIS — R78.81 BACTEREMIA: ICD-10-CM

## 2023-07-05 PROCEDURE — 250N000011 HC RX IP 250 OP 636: Mod: JZ | Performed by: REGISTERED NURSE

## 2023-07-05 PROCEDURE — 87040 BLOOD CULTURE FOR BACTERIA: CPT

## 2023-07-05 PROCEDURE — 36591 DRAW BLOOD OFF VENOUS DEVICE: CPT

## 2023-07-05 RX ORDER — HEPARIN SODIUM (PORCINE) LOCK FLUSH IV SOLN 100 UNIT/ML 100 UNIT/ML
5 SOLUTION INTRAVENOUS
Status: COMPLETED | OUTPATIENT
Start: 2023-07-05 | End: 2023-07-05

## 2023-07-05 RX ADMIN — Medication 5 ML: at 14:50

## 2023-07-05 NOTE — NURSING NOTE
Chief Complaint   Patient presents with     Lab Only     Labs drawn from port by rn.      Good blood return noted from previously-accessed port.  Labs drawn from port by rn.  Port flushed with NS and heparin.  Pt tolerated well.    Jaja Russell RN

## 2023-07-10 LAB — BACTERIA BLD CULT: NO GROWTH

## 2023-07-13 ENCOUNTER — VIRTUAL VISIT (OUTPATIENT)
Dept: ONCOLOGY | Facility: CLINIC | Age: 58
End: 2023-07-13
Attending: SOCIAL WORKER
Payer: COMMERCIAL

## 2023-07-13 DIAGNOSIS — F43.23 ADJUSTMENT DISORDER WITH MIXED ANXIETY AND DEPRESSED MOOD: Primary | ICD-10-CM

## 2023-07-13 PROCEDURE — 90834 PSYTX W PT 45 MINUTES: CPT | Mod: VID | Performed by: SOCIAL WORKER

## 2023-07-13 NOTE — PROGRESS NOTES
"Virtual Visit Details    Type of service:  Video Visit     Originating Location (pt. Location): Home    Distant Location (provider location):  On-site  Platform used for Video Visit: [employer]     Telemedicine Visit: The patient's condition can be safely assessed and treated via synchronous audio and visual telemedicine encounter.        Palliative Care Clinical Social Work Return Appointment    PLEASE NOTE:  THIS IS A MENTAL HEALTH NOTE.  OTHER PROVIDERS VIEWING THIS NOTE SHOULD USE THIS ONLY FOR UNDERSTANDING THE CONTEXT OF THE PATIENT'S EXPERIENCE.  TOPICS DESCRIBED IN THIS NOTE SHOULD NOT BE REFERENCED TO THE PATIENT BY MEDICAL PROVIDERS.    Dinora Mcghee is a 58  year old woman with multiple medical conditions including chronic pancreatitis s/p TPAIT, long term complications from gastroparesis, constipation, GERD, migraines.  Had GJ placed Sept 2021, recovery was complicated and she ended up with a venting g-tube.  Due to complications with PO tolerance she had surgical placement of a J tube, and a resection of densely adhered small bowel.  Started on tube feedings, now on TPN seen today via [employer] video for a return psychotherapy appointment to address adjustment disorder with anxiety and depressed mood as it relates to coping with illness and treatment.    Mental Status Exam:(List all that apply)      Appearance: Appropriate      Eye Contact: Good       Orientation: Yes, x4      Mood: Depressed      Affect: Appropriate      Thought Content: Clear         Thought Form: Logical      Psychomotor Behavior: Normal    Visit themes: MCP    Adjustment to Illness:  Struggling with ongoing nausea.  In the past month has had 4 \"good days\" which feels like an improvement. Working with her treatment team on managing symptoms. Still off TPN. Infection has cleared.     Was able to attend a much anticipated family wedding despite vomiting much of the day.     Mental Health (thoughts, feelings, actions, coping, and symptoms): " "    Session III  of Meaning Centered Psychotherapy -- Historical Sources of Meaning     Identified values:    Hospitality \"honor people as they are when they are in your space\"     Don't waste time arguing -- family values of putting disagreements aside \"and just revel in being together... that's more important than being right\"     Legacy work -- during the pandemic led over 700 people in her work about gratitude.     Family traditions -- meka morning rituals; recipes such as \"Taz spaghetti\"     \"I have the right to get smarter\"     I keep asking what there is I am meant to learn from this experience     Don't get lost in the details, cut out things that are not serving       Helpful activities: time with family.  Meditating, knitting, tapping.  Uses EMDR techniques.    Looking forward to 3 weddings, a class reunion,     Helpful cognitions:     Unhelpful and/or triggering or exacerbating factors:     Body-Mind Skills Education: uses tapping and EMDR     Relationships:  and children, extended family    End of Life and Advance Care Planning:     Main therapeutic interventions provided this session include:     Provide psychoeducation, Facilitate processing of thoughts and feelings and Meaning Centered Psychotherapy Session 1    Plan:  Will return for psychotherapy with palliative care focus in 1 week    Time spent with patient/family:  42 (Start 12:40, end 1:28)    JAIDA Nguyen, [facility]   Palliative Care Clinical   Ph: [phone]      DO NOT SEND ANY LETTERS              "

## 2023-07-13 NOTE — LETTER
"    7/13/2023         RE: Dinora Mcghee  [address] [facility] [address]        Dear Colleague,    Thank you for referring your patient, Dinora Mcghee, to the [facility]. Please see a copy of my visit note below.    Virtual Visit Details    Type of service:  Video Visit     Originating Location (pt. Location): Home    Distant Location (provider location):  On-site  Platform used for Video Visit: [employer]     Telemedicine Visit: The patient's condition can be safely assessed and treated via synchronous audio and visual telemedicine encounter.        Palliative Care Clinical Social Work Return Appointment    PLEASE NOTE:  THIS IS A MENTAL HEALTH NOTE.  OTHER PROVIDERS VIEWING THIS NOTE SHOULD USE THIS ONLY FOR UNDERSTANDING THE CONTEXT OF THE PATIENT'S EXPERIENCE.  TOPICS DESCRIBED IN THIS NOTE SHOULD NOT BE REFERENCED TO THE PATIENT BY MEDICAL PROVIDERS.    Dinora Mcghee is a 58  year old woman with multiple medical conditions including chronic pancreatitis s/p TPAIT, long term complications from gastroparesis, constipation, GERD, migraines.  Had GJ placed Sept 2021, recovery was complicated and she ended up with a venting g-tube.  Due to complications with PO tolerance she had surgical placement of a J tube, and a resection of densely adhered small bowel.  Started on tube feedings, now on TPN seen today via [employer] video for a return psychotherapy appointment to address adjustment disorder with anxiety and depressed mood as it relates to coping with illness and treatment.    Mental Status Exam:(List all that apply)      Appearance: Appropriate      Eye Contact: Good       Orientation: Yes, x4      Mood: Depressed      Affect: Appropriate      Thought Content: Clear         Thought Form: Logical      Psychomotor Behavior: Normal    Visit themes: MCP    Adjustment to Illness:  Struggling with ongoing nausea.  In the past month has had 4 \"good days\" which feels like an improvement. " "Working with her treatment team on managing symptoms. Still off TPN. Infection has cleared.     Was able to attend a much anticipated family wedding despite vomiting much of the day.     Mental Health (thoughts, feelings, actions, coping, and symptoms):     Session III  of Meaning Centered Psychotherapy -- Historical Sources of Meaning     Identified values:    Hospitality \"honor people as they are when they are in your space\"     Don't waste time arguing -- family values of putting disagreements aside \"and just revel in being together... that's more important than being right\"     Legacy work -- during the pandemic led over 700 people in her work about gratitude.     Family traditions -- meka morning rituals; recipes such as \"Taz spaghetti\"     \"I have the right to get smarter\"     I keep asking what there is I am meant to learn from this experience     Don't get lost in the details, cut out things that are not serving       Helpful activities: time with family.  Meditating, knitting, tapping.  Uses EMDR techniques.    Looking forward to 3 weddings, a class reunion,     Helpful cognitions:     Unhelpful and/or triggering or exacerbating factors:     Body-Mind Skills Education: uses tapping and EMDR     Relationships:  and children, extended family    End of Life and Advance Care Planning:     Main therapeutic interventions provided this session include:     Provide psychoeducation, Facilitate processing of thoughts and feelings and Meaning Centered Psychotherapy Session 1    Plan:  Will return for psychotherapy with palliative care focus in 1 week    Time spent with patient/family:  42 (Start 12:40, end 1:28)    JAIDA Nguyen, [facility]   Palliative Care Clinical   Ph: [phone]      DO NOT SEND ANY LETTERS                  Again, thank you for allowing me to participate in the care of your patient.        Sincerely,        VAQSUEZ Nguyen    "

## 2023-07-13 NOTE — LETTER
"    7/13/2023         RE: Dinora Mcghee  [address] [place] [address]        Dear Colleague,    Thank you for referring your patient, Dinora Mcghee, to the [facility]. Please see a copy of my visit note below.    Virtual Visit Details    Type of service:  Video Visit     Originating Location (pt. Location): Home    Distant Location (provider location):  On-site  Platform used for Video Visit: [employer]     Telemedicine Visit: The patient's condition can be safely assessed and treated via synchronous audio and visual telemedicine encounter.        Palliative Care Clinical Social Work Return Appointment    PLEASE NOTE:  THIS IS A MENTAL HEALTH NOTE.  OTHER PROVIDERS VIEWING THIS NOTE SHOULD USE THIS ONLY FOR UNDERSTANDING THE CONTEXT OF THE PATIENT'S EXPERIENCE.  TOPICS DESCRIBED IN THIS NOTE SHOULD NOT BE REFERENCED TO THE PATIENT BY MEDICAL PROVIDERS.    Dinora Mcghee is a 58  year old woman with multiple medical conditions including chronic pancreatitis s/p TPAIT, long term complications from gastroparesis, constipation, GERD, migraines.  Had GJ placed Sept 2021, recovery was complicated and she ended up with a venting g-tube.  Due to complications with PO tolerance she had surgical placement of a J tube, and a resection of densely adhered small bowel.  Started on tube feedings, now on TPN seen today via [employer] video for a return psychotherapy appointment to address adjustment disorder with anxiety and depressed mood as it relates to coping with illness and treatment.    Mental Status Exam:(List all that apply)      Appearance: Appropriate      Eye Contact: Good       Orientation: Yes, x4      Mood: Depressed      Affect: Appropriate      Thought Content: Clear         Thought Form: Logical      Psychomotor Behavior: Normal    Visit themes: MCP    Adjustment to Illness:  Struggling with ongoing nausea.  In the past month has had 4 \"good days\" which feels like an improvement. " "Working with her treatment team on managing symptoms. Still off TPN. Infection has cleared.     Was able to attend a much anticipated family wedding despite vomiting much of the day.     Mental Health (thoughts, feelings, actions, coping, and symptoms):     Session III  of Meaning Centered Psychotherapy -- Historical Sources of Meaning     Identified values:    Hospitality \"honor people as they are when they are in your space\"     Don't waste time arguing -- family values of putting disagreements aside \"and just revel in being together... that's more important than being right\"     Legacy work -- during the pandemic led over 700 people in her work about gratitude.     Family traditions -- meka morning rituals; recipes such as \"Taz spaghetti\"     \"I have the right to get smarter\"     I keep asking what there is I am meant to learn from this experience     Don't get lost in the details, cut out things that are not serving       Helpful activities: time with family.  Meditating, knitting, tapping.  Uses EMDR techniques.    Looking forward to 3 weddings, a class reunion,     Helpful cognitions:     Unhelpful and/or triggering or exacerbating factors:     Body-Mind Skills Education: uses tapping and EMDR     Relationships:  and children, extended family    End of Life and Advance Care Planning:     Main therapeutic interventions provided this session include:     Provide psychoeducation, Facilitate processing of thoughts and feelings and Meaning Centered Psychotherapy Session 1    Plan:  Will return for psychotherapy with palliative care focus in 1 week    Time spent with patient/family:  42 (Start 12:40, end 1:28)    JAIDA Nguyen, [facility]   Palliative Care Clinical   Ph: [phone]      DO NOT SEND ANY LETTERS                  Again, thank you for allowing me to participate in the care of your patient.        Sincerely,        VASQUEZ Nguyen    "

## 2023-07-17 ENCOUNTER — TRANSFERRED RECORDS (OUTPATIENT)
Dept: HEALTH INFORMATION MANAGEMENT | Facility: CLINIC | Age: 58
End: 2023-07-17
Payer: COMMERCIAL

## 2023-07-18 ENCOUNTER — CARE COORDINATION (OUTPATIENT)
Dept: GASTROENTEROLOGY | Facility: CLINIC | Age: 58
End: 2023-07-18
Payer: COMMERCIAL

## 2023-07-18 DIAGNOSIS — K86.89 PANCREATIC INSUFFICIENCY: Primary | ICD-10-CM

## 2023-07-18 RX ORDER — ENTERAL PUMP ACCESS.HYDROLYSIS
2 CARTRIDGE (EA) MISCELLANEOUS DAILY
Qty: 60 EACH | Refills: 6
Start: 2023-07-18

## 2023-07-18 NOTE — PROGRESS NOTES
Pt called in, requesting more Reliazorb. Orders placed in chart, Message sent to [facility]. Pt reports feeling ok but is struggling with nausea after taking protein, has reached out to [facility] pharmacists regarding it also. Confirmed plan for clinic with Dr. Park 7-24-23    ML

## 2023-07-20 ENCOUNTER — DOCUMENTATION ONLY (OUTPATIENT)
Dept: GASTROENTEROLOGY | Facility: CLINIC | Age: 58
End: 2023-07-20
Payer: COMMERCIAL

## 2023-07-20 ENCOUNTER — OFFICE VISIT (OUTPATIENT)
Dept: TRANSPLANT | Facility: CLINIC | Age: 58
End: 2023-07-20
Attending: PEDIATRICS
Payer: COMMERCIAL

## 2023-07-20 VITALS
BODY MASS INDEX: 20.33 KG/M2 | DIASTOLIC BLOOD PRESSURE: 85 MMHG | RESPIRATION RATE: 16 BRPM | SYSTOLIC BLOOD PRESSURE: 129 MMHG | OXYGEN SATURATION: 98 % | HEART RATE: 91 BPM | WEIGHT: 133.7 LBS

## 2023-07-20 DIAGNOSIS — Z90.410 POST-PANCREATECTOMY DIABETES (H): Primary | ICD-10-CM

## 2023-07-20 DIAGNOSIS — E13.9 POST-PANCREATECTOMY DIABETES (H): Primary | ICD-10-CM

## 2023-07-20 DIAGNOSIS — E89.1 POST-PANCREATECTOMY DIABETES (H): Primary | ICD-10-CM

## 2023-07-20 PROCEDURE — 99213 OFFICE O/P EST LOW 20 MIN: CPT | Performed by: PEDIATRICS

## 2023-07-20 PROCEDURE — 99215 OFFICE O/P EST HI 40 MIN: CPT | Performed by: PEDIATRICS

## 2023-07-20 NOTE — LETTER
7/20/2023         RE: Dinora Mcghee  [address] [facility] [address]        Dear Colleague,    Thank you for referring your patient, Dinora Mcghee, to the [facility]. Please see a copy of my visit note below.    [facility]  Islet Autotransplant, Diabetes Follow Up    Problem List:  Patient Active Problem List   Diagnosis    Hypothyroidism    Need for prophylactic immunotherapy    Sprain and strain of other specified sites of hip and thigh    Chondromalacia of patella    Sensorineural hearing loss    Vertiginous syndrome and labyrinthine disorder    Lumbago    Allergic rhinitis due to other allergen    GERD (gastroesophageal reflux disease)    Other type of intractable migraine    History of ERCP    Abdominal pain    S/P ERCP    Post-pancreatectomy diabetes (H)    Pancreatic insufficiency    ACP (advance care planning)    Gastroparesis    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    Incisional hernia, without obstruction or gangrene    Adhesive capsulitis of shoulder, unspecified laterality    S/P hernia repair    Headache, chronic migraine without aura    Chronic pain syndrome    Iron deficiency anemia    Hypoglycemia    Adult failure to thrive    Abdominal pain, generalized    Gastrostomy tube obstruction (H)    Intra-abdominal abscess (H)    Postprocedural intraabdominal abscess    Acquired absence of spleen    Depressive disorder    Diabetes insipidus (H)    Other specified abnormal immunological findings in serum    Poisoning by drug    Sphincter of Oddi dysfunction    Type 1 diabetes mellitus without complication (H)    Myofascial pain    Feeding intolerance    Acute postoperative abdominal pain    Major depression, single episode    History of food intolerance    Malnutrition, unspecified type (H)    Nausea and vomiting, unspecified vomiting type    On total parenteral nutrition (TPN)    Fever, unspecified fever cause    Chronic pancreatitis,  unspecified pancreatitis type (H)    Cholangitis       HPI:  Dinora is a 58 year old  female here for follow up oflaparoscopic assisted total pancreatectomy, islet cell autotransplant, splenectomy, gastrojejunostomy tube revision, choledochoduodenostomy, duodenojejunostomy, Vera-Y reconstruction performed on 2016.  At the time of the procedure, the patient received:  Total Islet number: [id number].  Total Islet number/k.  Islet equivalents: [id number]  Islet equivalents/kilogram: 4091    Post-surgical course was complicated by two minor procedures for a retained foreign body near GJ site in 2017 and hernia repair 2018, and more recently by gastroparesis.  She has been insulin independent since about 1 year after surgery.    - Gastroparesis is her major issue post TPIAT. Admitted multiple times. GJ was helpful for short time but did not tolerate well.  Has tried multiple other approaches and continues to worsen, nothing offering benefit.  Tried expanded access domperidone but not helpful  - Also has hypoglycemia (prior treatments SGLT2 inhibitor not helpful, acarbose is helpful when eating, also uses mini glucagon).     At today's visit,   -- Trial of domperidone was not effective, and her EKG QTc was borderline for continuing so therapy was discontinued, as previously noted.   -- She was on TPN for a short time, and we did put insulin into the TPN, but unfortunately she developed infectious complications and the TPN was stopped and the central line removed.  -- She was able to get a new J tube placed and is actually tolerating feeds better this time. She cycles feeds overnight.  She is no longer on insulin now that she is cycling feeds.  -- It has been a really rough year for her. She is tearful.  She has had many complications with tube feeds and TPN.  Although she is tolerating tube feeds now she still gets quite ill from her gastroparesis and it is disabling. She is a very engaged and active person so  she manages to do a lot despite her illness but describes it as a half life. 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 She has been able to spend time and take trips with some family and friends.     Diabetes history:  Current insulin regimen:  None    For hypoglycemia she currently uses CGM for early detection plus mini dosing of glucagon as needed.     Wearing Yandy              Recent hemoglobin A1c levels:  HbA1c 5.5%  Lab Results   Component Value Date    A1C 5.4 02/17/2022    A1C 5.2 11/27/2021    A1C 5.5 09/18/2021    A1C 5.4 08/05/2021    A1C 5.7 05/12/2021    A1C 5.9 04/01/2021    A1C 5.5 12/17/2020    A1C 5.8 07/30/2020     Hypoglycemia history: mild lows but rapid falls- less of an issue recentl.y. The patient has had 0 episodes of severe hypoglycemia (seizure, coma, or neuroglycopenic symptoms severe enough to require assistance from another person).  Blood sugars were reviewed from the patient records and/or the meter download.          Review of systems:  A complete ROS is negative except as noted in HPI above.      Past Medical and Surgical History:  Past Medical History:   Diagnosis Date    Allergic rhinitis, cause unspecified     Allergy to other foods     strawberries, apples, celeries, alice, watermelon    Arthritis     left knee    Choledocholithiasis     long after cholecystectomy    Chronic abdominal pain     Chronic constipation     Chronic nausea     Chronic pancreatitis (H)     Degeneration of lumbar or lumbosacral intervertebral disc     Esophageal reflux     w/ hiatal hernia    Gastroparesis     Hiatal hernia     History of pituitary adenoma     s/p resection    Hypothyroidism     Migraines     Mild hyperlipidemia     On tube feeding diet     presence of GJ tube    Pancreatic disease     PD stricture, 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     PONV (postoperative nausea and vomiting)     Portacath in place     Type 1 diabetes mellitus without complication (H)  2022    Unspecified hearing loss     25% high frequency R     Past Surgical History:   Procedure Laterality Date    ABDOMEN SURGERY      c sections: 93, 96, 98. endometriosis growth    APPENDECTOMY       SECTION      COLONOSCOPY      COLONOSCOPY N/A 2023    Procedure: COLONOSCOPY, WITH POLYPECTOMY AND BIOPSY;  Surgeon: Percy Chamorro MD;  [facility]    ENDOSCOPIC INSERTION TUBE GASTROSTOMY N/A 2021    Procedure: Gastrojejonostomy placement with jejunopexy, PEG tube exchange;  Surgeon: Zackery Montoya MD;  [facility]    ENDOSCOPIC RETROGRADE CHOLANGIOPANCREATOGRAM N/A 2015    Procedure: ENDOSCOPIC RETROGRADE CHOLANGIOPANCREATOGRAM;  Surgeon: Mandeep Park MD;  [facility]    ENDOSCOPIC RETROGRADE CHOLANGIOPANCREATOGRAM N/A 2016    Procedure: COMBINED ENDOSCOPIC RETROGRADE CHOLANGIOPANCREATOGRAPHY, PLACE TUBE/STENT;  Surgeon: Mandeep Park MD;  [facility]    ENDOSCOPIC RETROGRADE CHOLANGIOPANCREATOGRAM N/A 3/17/2016    Procedure: COMBINED ENDOSCOPIC RETROGRADE CHOLANGIOPANCREATOGRAPHY, REMOVE FOREIGN BODY OR STENT/TUBE;  Surgeon: Mandeep Park MD;  [facility]    ENDOSCOPIC RETROGRADE CHOLANGIOPANCREATOGRAM N/A 2016    Procedure: COMBINED ENDOSCOPIC RETROGRADE CHOLANGIOPANCREATOGRAPHY, PLACE TUBE/STENT;  Surgeon: Mandeep Park MD;  [facility]    ENDOSCOPIC RETROGRADE CHOLANGIOPANCREATOGRAM N/A 2016    Procedure: COMBINED ENDOSCOPIC RETROGRADE CHOLANGIOPANCREATOGRAPHY, REMOVE FOREIGN BODY OR STENT/TUBE;  Surgeon: Mandeep Park MD;  [facility]    ENDOSCOPIC ULTRASOUND UPPER GASTROINTESTINAL TRACT (GI) N/A 10/3/2016    Procedure: ENDOSCOPIC ULTRASOUND, ESOPHAGOSCOPY / UPPER GASTROINTESTINAL TRACT (GI);  Surgeon: Guru Jose Rodas MD;  [facility]    ENTEROSCOPY SMALL BOWEL N/A 2/3/2022    Procedure: ENTEROSCOPY with possible fistula closure;   Surgeon: Francisco Dodson MD;  Location:  GI    ESOPHAGOSCOPY, GASTROSCOPY, DUODENOSCOPY (EGD), COMBINED N/A 6/24/2015    Procedure: COMBINED ESOPHAGOSCOPY, GASTROSCOPY, DUODENOSCOPY (EGD), REMOVE FOREIGN BODY;  Surgeon: Mandeep Park MD;  [facility]    ESOPHAGOSCOPY, GASTROSCOPY, DUODENOSCOPY (EGD), COMBINED N/A 10/25/2015    Procedure: COMBINED ESOPHAGOSCOPY, GASTROSCOPY, DUODENOSCOPY (EGD);  Surgeon: Sammy Amaro MD;  [facility]    ESOPHAGOSCOPY, GASTROSCOPY, DUODENOSCOPY (EGD), COMBINED N/A 10/25/2015    Procedure: COMBINED ESOPHAGOSCOPY, GASTROSCOPY, DUODENOSCOPY (EGD), BIOPSY SINGLE OR MULTIPLE;  Surgeon: Sammy Amaro MD;  [facility]    ESOPHAGOSCOPY, GASTROSCOPY, DUODENOSCOPY (EGD), COMBINED N/A 1/31/2023    Procedure: ESOPHAGOGASTRODUODENOSCOPY (EGD) with peg replacement ;  Surgeon: Mandeep Park MD;  [facility]    ESOPHAGOSCOPY, GASTROSCOPY, DUODENOSCOPY (EGD), DILATATION, COMBINED      EXCISE LESION TRUNK N/A 4/17/2017    Procedure: EXCISE LESION TRUNK;  Removal of Abdominal Foreign Body;  Surgeon: Nestor Phoenix MD;  [facility]    HC ESOPH/GAS REFLUX TEST W NASAL IMPED >1 HR N/A 11/19/2015    Procedure: ESOPHAGEAL IMPEDENCE FUNCTION TEST WITH 24 HOUR PH GREATER THAN 1 HOUR;  Surgeon: Thiago Apple MD;  [facility]    HC UGI ENDOSCOPY DIAG W BIOPSY  9/17/08    HC UGI ENDOSCOPY DIAG W BIOPSY  9/27/12    HC UGI ENDOSCOPY W ESOPHAGEAL DILATION BALLOON <30MM  9/17/08    HC UGI ENDOSCOPY W EUS N/A 5/5/2015    Procedure: COMBINED ENDOSCOPIC ULTRASOUND, ESOPHAGOSCOPY, GASTROSCOPY, DUODENOSCOPY (EGD);  Surgeon: Wm Dueñas MD;  [facility]    HC WRIST ARTHROSCOP,RELEASE XVERS LIG Bilateral 12/17/08    INJECT TRANSVERSUS ABDOMINIS PLANE (TAP) BLOCK BILATERAL Left 9/22/2016    Procedure: INJECT TRANSVERSUS ABDOMINIS PLANE (TAP) BLOCK BILATERAL;  Surgeon: Dickson Corirgan MD;  [facility]    INJECT TRIGGER POINT Bilateral  9/8/2022    Procedure: Abdominal trigger point injection with ultrasound;  Surgeon: Monika Mahajan MD;  [facility]    INJECT TRIGGER POINT SINGLE / MULTIPLE 3 OR MORE MUSCLES Right 11/15/2022    Procedure: Trigger point injections right abdomen with ultrasound guidance;  Surgeon: Monika Mahajan MD;  [facility]    IR CHEST PORT PLACEMENT < 5 YRS OF AGE  6/10/2022    laparoscopic pineda  1995    LAPAROSCOPIC HERNIORRHAPHY INCISIONAL N/A 8/23/2018    Procedure: LAPAROSCOPIC HERNIORRHAPHY INCISIONAL;  Laparoscopic Incisional Hernia Repair with Symbotex Mesh Implant;  Surgeon: Nestor Phoenix MD;  [facility]    LAPAROSCOPIC PANCREATECTOMY, TRANSPLANT AUTO ISLET CELL N/A 12/28/2016    Procedure: LAPAROSCOPIC PANCREATECTOMY, TRANSPLANT AUTO ISLET CELL;  Surgeon: Nestor Phoenix MD;  [facility]    LAPAROTOMY EXPLORATORY N/A 1/31/2023    Procedure: Exploratory Laparotomy, lysis of adhesions, Perforated J-Junostomy Resection, Replace J-Junostomy site;  Surgeon: Elver Bauer MD;  [facility]    LAPAROTOMY, LYSIS ADHESIONS, COMBINED N/A 1/31/2023    Procedure: Dilatation of jejunostomy feeding tube, track with placement of jejunostomy tube with fluoroscopy;  Surgeon: Elver Bauer MD;  [facility]    REMOVE AND REPLACE BREAST IMPLANT PROSTHESIS N/A 12/30/2022    Procedure: Exploratory Laparotomy, lysis of adhesions, small bowel resection. Placement of gastric jejunostomy for enteral feeding.;  Surgeon: Elver Bauer MD;  [facility]    REMOVE GASTROSTOMY TUBE ADULT N/A 1/28/2022    Procedure: REMOVAL, GASTROSTOMY TUBE, ADULT;  Surgeon: Mandeep Park MD;  [facility]    REPLACE GASTROJEJUNOSTOMY TUBE, PERCUTANEOUS N/A 9/7/2021    Procedure: GASTROJEJUNOSTOMY TUBE PLACEMENT, PERCUTANEOUS, WITH GASTROPEXY;  Surgeon: Mandeep Park MD;  [facility]    REPLACE GASTROJEJUNOSTOMY TUBE, PERCUTANEOUS N/A 9/22/2021    Procedure: REPLACEMENT,  GASTROJEJUNOSTOMY TUBE, PERCUTANEOUS;  Surgeon: Zackery Montoya MD;  [facility]    REPLACE GASTROJEJUNOSTOMY TUBE, PERCUTANEOUS N/A 2022    Procedure: REPLACEMENT, GASTROJEJUNOSTOMY TUBE, PERCUTANEOUS;  Surgeon: Mandeep Park MD;  [facility]    REPLACE GASTROJEJUNOSTOMY TUBE, PERCUTANEOUS N/A 2022    Procedure: REPLACEMENT, GASTROJEJUNOSTOMY TUBE, PERCUTANEOUS with ENDOSCOPIC SUTURING.;  Surgeon: Mandeep Park MD;  [facility]    REPLACE JEJUNOSTOMY TUBE, PERCUTANEOUS N/A 9/10/2021    Procedure: UPPER ENDOSCOPY, REPLACEMENT OF PERCUTANEOUS GASTROJEJUNOSTOMY TUBE, T-TAG GASTROPEXY;  Surgeon: Zackery Montoya MD;  [facility]    REPLACE JEJUNOSTOMY TUBE, PERCUTANEOUS N/A 2021    Procedure: REPLACEMENT, JEJUNOSTOMY TUBE, PERCUTANEOUS;  Surgeon: Mandeep Park MD;  [facility]    REPLACE JEJUNOSTOMY TUBE, PERCUTANEOUS N/A 2023    Procedure: REPLACEMENT, JEJUNOSTOMY TUBE, PERCUTANEOUS;  Surgeon: Mandeep Park MD;  [facility]    transphenoidal pituitary resection      Z  DELIVERY ONLY      Z  DELIVERY ONLY      repeat c section with incidental cystotomy with repair    [facility] EXCIS PITUITARY,TRANSNASAL/SEPTAL      pituitary tumor removed for diabetes insipidus    [facility] TOTAL ABDOM HYSTERECTOMY      w/ bilateral salpingoophorectomy        Family History:  New changes since last visit:  none  Family History   Problem Relation Age of Onset    Lipids Mother     Hypertension Mother     Thyroid Disease Mother     Depression Mother     Angina Mother     GERD Mother     Skin Cancer Mother     Migraines Mother     Autoimmune Disease Mother     Hyperlipidemia Mother     Mental Illness Mother     Eye Disorder Father         cataract, detached retina    Myocardial Infarction Father 60    Lipids Father     Cerebrovascular Disease Father     Depression Father     Substance Abuse Father     Anesthesia  Reaction Father         stroke right after surgery    Cataracts Father     Osteoarthritis Father     Ulcerative Colitis Father     Autoimmune Disease Father     Heart Disease Father     Hyperlipidemia Father     Mental Illness Father     Interpersonal Violence Sister     Coronary Artery Disease Sister         angioplasty    GERD Sister     Substance Abuse Sister     Depression Sister     Thyroid Disease Sister     Eye Disorder Maternal Grandmother         cataract    Thyroid Disease Maternal Grandmother     Diabetes Maternal Grandfather     Eye Disorder Paternal Grandmother         cataract    Diabetes Paternal Grandmother     Eye Disorder Paternal Grandfather         cataract    Diabetes Paternal Grandfather     Substance Abuse Paternal Grandfather     Eye Disorder Son         ptosis    Depression Son     Anxiety Disorder Son     Heart Disease Paternal Aunt     Diabetes Paternal Aunt     Diabetes Paternal Uncle     Heart Disease Paternal Uncle     Depression Nephew     Anxiety Disorder Nephew     Thyroid Disease Nephew     Diabetes Type 2  Cousin         paternal cousin    Autoimmune Disease Cousin     Autoimmune Disease Sister     Depression Sister     Mental Illness Sister     Substance Abuse Sister     Thyroid Disease Sister     Depression Son     Mental Illness Son     Thyroid Disease Nephew        Social History:  Social History     Social History Narrative     with 3 children and a dog.  No smoking, etoh or drug use.  Worked as a  for [email or web address] in [place] in the past.  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     Physical Exam:  Vitals: /85 (BP Location: Right arm, Patient Position: Sitting, Cuff Size: Adult Regular)   Pulse 91   Resp 16   Wt 60.6 kg (133 lb 11.2 oz)   SpO2 98%   BMI 20.33 kg/m    BMI= Body mass index is 20.33 kg/m .  General:   Appearance is normal, no acute distress  HEENT:  NC/AT, sclera appear white  Neck:  No obvious thyromegaly  CV/Lungs:  Non distressed breathing  Skin:  No apparent rashes  Neuro:  Normal mental status  Psych:  Normal affect          Assessment:  1.  Post pancreatectomy diabetes mellitus, s/p total pancreatectomy and islet autotransplant.  (ICD-10 E89.1)  2.  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   Gastroparesis  4.  Nausea, vomiting      Dinora is a 58 year old with history of chronic pancreatitis who is s/p total pancreatectomy and islet autotransplant.  She has been insulin independent with A1c in goal range.    Her major glycemic issue has been hypoglycemia.  Overall this is in reasonable control for her currently.  She does have episodes of hypoglycemia, however, and sometimes these follow a drop from a hyperglycemic excursion.  They are not particularly predictable and can occur rapidly, and are not necessarily food related as she does not eat much for nutrition now.  She catches them by wearing CGM and is able to intervene, and use mini glucagon if needed for faster recovery.  I don't have any other great treatments to offer to prevent these hypoglycemic episodes.  Although we have sometimes used diazoxide I don't think her hypoglycemia is frequent or profound enough to justify this, as the trade off will be that all glucoses are raised a bit and she will have modest hyperglycemia.  We will continue our current plan and follow.    Plan:  1.  Changes to current diabetes regimen:  Patient Instructions   1)  Continue to wear Yandy CGM for monitoring for hypoglycemia.    2)  Treatment of hypoglycemia with oral carbohydate and mini-doses of glucagon as needed.  Needs to get the 'old fashioned' glucagon kit in order to do mini dosing.    2.  Frequency of blood sugar checks:  Keep wearing  Yandy-- this is much better for Dinora than fingersticks because with fingersticks we miss the really critical data of the post prandial excursions.    3.  Continue routine follow up for autoislet transplant patients:  Mixed meal test (6 mL/kg BoostHP to max of 360 mL) at 3 months, 6 months, and once a year post transplant.  Hemoglobin A1c levels at these time points and quarterly.    4.  Other issues addressed today:  none    Follow up:  4 mos    Contact me for questions at [phone] or [phone].  Emergency number to reach pediatric endocrinology after hours is [phone].      Dr. Gloria Melissa MD  [facility]  Director of Research, Islet Auto-transplant Program  Phone:  [phone]  Electronically signed: July 25, 2023 at 10:10 AM          Review of the result(s) of each unique test - HbA1c, yandy  40 minutes spent on the date of the encounter doing chart review, history and exam, documentation, downloading and reviewing CGM data,  and further activities per the note

## 2023-07-20 NOTE — PROGRESS NOTES
Called PT and left VM.    Called to remind patient of their upcoming appointment with our GI clinic, on 07/24/23 at 2:30 PM with Dr. Mandeep Park. This appointment is scheduled as a video visit. You will receive a call approximately 30 minutes prior to check you in, you must be in [place] for this visit., if your appointment is virtual (video or telephone) you need to be in [place] for the visit. To reschedule or cancel patient to call [phone].      SK

## 2023-07-20 NOTE — PROGRESS NOTES
Patient called and confirmed their upcoming appointment with our GI clinic, on 07/24/23 at 2:30 PM with Dr. Mandeep Park. This appointment is scheduled as a video visit. You will receive a call approximately 30 minutes prior to check you in, you must be in [place] for this visit., if your appointment is virtual (video or telephone) you need to be in [place] for the visit. To reschedule or cancel patient to call [phone].        SK

## 2023-07-20 NOTE — NURSING NOTE
Chief Complaint   Patient presents with     Follow Up     Islet txp 12/28/2016     /85 (BP Location: Right arm, Patient Position: Sitting, Cuff Size: Adult Regular)   Pulse 91   Resp 16   Wt 60.6 kg (133 lb 11.2 oz)   SpO2 98%   BMI 20.33 kg/m      CARYN HOFF, RN on 7/20/2023 at 3:26 PM

## 2023-07-21 ENCOUNTER — DOCUMENTATION ONLY (OUTPATIENT)
Dept: GASTROENTEROLOGY | Facility: CLINIC | Age: 58
End: 2023-07-21
Payer: COMMERCIAL

## 2023-07-21 NOTE — PROGRESS NOTES
Called [facility] to request images be pushed to [facility].    Images Requested:  CT Chest Abdomen Pelvis Angiogram with IV Contrast (06/03/2023 1:00 PM CDT)    Facility Information:  [facility]    [address] [place] [address]    Phone #: [phone]  [facility] Phone #: [phone]  [facility] Fax #: [phone] sk

## 2023-07-21 NOTE — PROGRESS NOTES
[facility]  Islet Autotransplant, Diabetes Follow Up    Problem List:  Patient Active Problem List   Diagnosis    Hypothyroidism    Need for prophylactic immunotherapy    Sprain and strain of other specified sites of hip and thigh    Chondromalacia of patella    Sensorineural hearing loss    Vertiginous syndrome and labyrinthine disorder    Lumbago    Allergic rhinitis due to other allergen    GERD (gastroesophageal reflux disease)    Other type of intractable migraine    History of ERCP    Abdominal pain    S/P ERCP    Post-pancreatectomy diabetes (H)    Pancreatic insufficiency    ACP (advance care planning)    Gastroparesis    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    Incisional hernia, without obstruction or gangrene    Adhesive capsulitis of shoulder, unspecified laterality    S/P hernia repair    Headache, chronic migraine without aura    Chronic pain syndrome    Iron deficiency anemia    Hypoglycemia    Adult failure to thrive    Abdominal pain, generalized    Gastrostomy tube obstruction (H)    Intra-abdominal abscess (H)    Postprocedural intraabdominal abscess    Acquired absence of spleen    Depressive disorder    Diabetes insipidus (H)    Other specified abnormal immunological findings in serum    Poisoning by drug    Sphincter of Oddi dysfunction    Type 1 diabetes mellitus without complication (H)    Myofascial pain    Feeding intolerance    Acute postoperative abdominal pain    Major depression, single episode    History of food intolerance    Malnutrition, unspecified type (H)    Nausea and vomiting, unspecified vomiting type    On total parenteral nutrition (TPN)    Fever, unspecified fever cause    Chronic pancreatitis, unspecified pancreatitis type (H)    Cholangitis       HPI:  Dinora is a 58 year old  female here for follow up oflaparoscopic assisted total pancreatectomy, islet cell autotransplant, splenectomy, gastrojejunostomy tube revision, choledochoduodenostomy,  duodenojejunostomy, Vera-Y reconstruction performed on 2016.  At the time of the procedure, the patient received:  Total Islet number: [id number].  Total Islet number/k.  Islet equivalents: [id number]  Islet equivalents/kilogram: 4091    Post-surgical course was complicated by two minor procedures for a retained foreign body near GJ site in 2017 and hernia repair 2018, and more recently by gastroparesis.  She has been insulin independent since about 1 year after surgery.    - Gastroparesis is her major issue post TPIAT. Admitted multiple times. GJ was helpful for short time but did not tolerate well.  Has tried multiple other approaches and continues to worsen, nothing offering benefit.  Tried expanded access domperidone but not helpful  - Also has hypoglycemia (prior treatments SGLT2 inhibitor not helpful, acarbose is helpful when eating, also uses mini glucagon).     At today's visit,   -- Trial of domperidone was not effective, and her EKG QTc was borderline for continuing so therapy was discontinued, as previously noted.   -- She was on TPN for a short time, and we did put insulin into the TPN, but unfortunately she developed infectious complications and the TPN was stopped and the central line removed.  -- She was able to get a new J tube placed and is actually tolerating feeds better this time. She cycles feeds overnight.  She is no longer on insulin now that she is cycling feeds.  -- It has been a really rough year for her. She is tearful.  She has had many complications with tube feeds and TPN.  Although she is tolerating tube feeds now she still gets quite ill from her gastroparesis and it is disabling. She is a very engaged and active person so she manages to do a lot despite her illness but describes it as a half life. 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 She has been able to spend time and take trips with some family and friends.     Diabetes history:  Current  insulin regimen:  None    For hypoglycemia she currently uses CGM for early detection plus mini dosing of glucagon as needed.     Wearing Yandy              Recent hemoglobin A1c levels:  HbA1c 5.5%  Lab Results   Component Value Date    A1C 5.4 2022    A1C 5.2 2021    A1C 5.5 2021    A1C 5.4 2021    A1C 5.7 2021    A1C 5.9 2021    A1C 5.5 2020    A1C 5.8 2020     Hypoglycemia history: mild lows but rapid falls- less of an issue recentl.y. The patient has had 0 episodes of severe hypoglycemia (seizure, coma, or neuroglycopenic symptoms severe enough to require assistance from another person).  Blood sugars were reviewed from the patient records and/or the meter download.          Review of systems:  A complete ROS is negative except as noted in HPI above.      Past Medical and Surgical History:  Past Medical History:   Diagnosis Date    Allergic rhinitis, cause unspecified     Allergy to other foods     strawberries, apples, celeries, alice, watermelon    Arthritis     left knee    Choledocholithiasis     long after cholecystectomy    Chronic abdominal pain     Chronic constipation     Chronic nausea     Chronic pancreatitis (H)     Degeneration of lumbar or lumbosacral intervertebral disc     Esophageal reflux     w/ hiatal hernia    Gastroparesis     Hiatal hernia     History of pituitary adenoma     s/p resection    Hypothyroidism     Migraines     Mild hyperlipidemia     On tube feeding diet     presence of GJ tube    Pancreatic disease     PD stricture, 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 endometriosis growth    APPENDECTOMY       SECTION       COLONOSCOPY  2014    COLONOSCOPY N/A 6/9/2023    Procedure: COLONOSCOPY, WITH POLYPECTOMY AND BIOPSY;  Surgeon: Percy Chamorro MD;  [facility]    ENDOSCOPIC INSERTION TUBE GASTROSTOMY N/A 11/28/2021    Procedure: Gastrojejonostomy placement with jejunopexy, PEG tube exchange;  Surgeon: Zackery Montoya MD;  [facility]    ENDOSCOPIC RETROGRADE CHOLANGIOPANCREATOGRAM N/A 6/2/2015    Procedure: ENDOSCOPIC RETROGRADE CHOLANGIOPANCREATOGRAM;  Surgeon: Mandeep Park MD;  [facility]    ENDOSCOPIC RETROGRADE CHOLANGIOPANCREATOGRAM N/A 2/9/2016    Procedure: COMBINED ENDOSCOPIC RETROGRADE CHOLANGIOPANCREATOGRAPHY, PLACE TUBE/STENT;  Surgeon: Mandeep Park MD;  [facility]    ENDOSCOPIC RETROGRADE CHOLANGIOPANCREATOGRAM N/A 3/17/2016    Procedure: COMBINED ENDOSCOPIC RETROGRADE CHOLANGIOPANCREATOGRAPHY, REMOVE FOREIGN BODY OR STENT/TUBE;  Surgeon: Mandeep Park MD;  [facility]    ENDOSCOPIC RETROGRADE CHOLANGIOPANCREATOGRAM N/A 8/2/2016    Procedure: COMBINED ENDOSCOPIC RETROGRADE CHOLANGIOPANCREATOGRAPHY, PLACE TUBE/STENT;  Surgeon: Mandeep Park MD;  [facility]    ENDOSCOPIC RETROGRADE CHOLANGIOPANCREATOGRAM N/A 8/26/2016    Procedure: COMBINED ENDOSCOPIC RETROGRADE CHOLANGIOPANCREATOGRAPHY, REMOVE FOREIGN BODY OR STENT/TUBE;  Surgeon: Mandeep Park MD;  [facility]    ENDOSCOPIC ULTRASOUND UPPER GASTROINTESTINAL TRACT (GI) N/A 10/3/2016    Procedure: ENDOSCOPIC ULTRASOUND, ESOPHAGOSCOPY / UPPER GASTROINTESTINAL TRACT (GI);  Surgeon: Guru Jose Rodas MD;  [facility]    ENTEROSCOPY SMALL BOWEL N/A 2/3/2022    Procedure: ENTEROSCOPY with possible fistula closure;  Surgeon: Francisco Dodson MD;  [facility]    ESOPHAGOSCOPY, GASTROSCOPY, DUODENOSCOPY (EGD), COMBINED N/A 6/24/2015    Procedure: COMBINED ESOPHAGOSCOPY, GASTROSCOPY, DUODENOSCOPY (EGD), REMOVE FOREIGN BODY;  Surgeon: Mandeep Park MD;   [facility]    ESOPHAGOSCOPY, GASTROSCOPY, DUODENOSCOPY (EGD), COMBINED N/A 10/25/2015    Procedure: COMBINED ESOPHAGOSCOPY, GASTROSCOPY, DUODENOSCOPY (EGD);  Surgeon: Sammy Amaro MD;  [facility]    ESOPHAGOSCOPY, GASTROSCOPY, DUODENOSCOPY (EGD), COMBINED N/A 10/25/2015    Procedure: COMBINED ESOPHAGOSCOPY, GASTROSCOPY, DUODENOSCOPY (EGD), BIOPSY SINGLE OR MULTIPLE;  Surgeon: Sammy Amaro MD;  [facility]    ESOPHAGOSCOPY, GASTROSCOPY, DUODENOSCOPY (EGD), COMBINED N/A 1/31/2023    Procedure: ESOPHAGOGASTRODUODENOSCOPY (EGD) with peg replacement ;  Surgeon: Mandeep Park MD;  [facility]    ESOPHAGOSCOPY, GASTROSCOPY, DUODENOSCOPY (EGD), DILATATION, COMBINED      EXCISE LESION TRUNK N/A 4/17/2017    Procedure: EXCISE LESION TRUNK;  Removal of Abdominal Foreign Body;  Surgeon: Nestor Phoenix MD;  [facility]    HC ESOPH/GAS REFLUX TEST W NASAL IMPED >1 HR N/A 11/19/2015    Procedure: ESOPHAGEAL IMPEDENCE FUNCTION TEST WITH 24 HOUR PH GREATER THAN 1 HOUR;  Surgeon: Thiago Apple MD;  [facility]    HC UGI ENDOSCOPY DIAG W BIOPSY  9/17/08    HC UGI ENDOSCOPY DIAG W BIOPSY  9/27/12    HC UGI ENDOSCOPY W ESOPHAGEAL DILATION BALLOON <30MM  9/17/08    HC UGI ENDOSCOPY W EUS N/A 5/5/2015    Procedure: COMBINED ENDOSCOPIC ULTRASOUND, ESOPHAGOSCOPY, GASTROSCOPY, DUODENOSCOPY (EGD);  Surgeon: Wm Dueñas MD;  [facility]    HC WRIST ARTHROSCOP,RELEASE XVERS LIG Bilateral 12/17/08    INJECT TRANSVERSUS ABDOMINIS PLANE (TAP) BLOCK BILATERAL Left 9/22/2016    Procedure: INJECT TRANSVERSUS ABDOMINIS PLANE (TAP) BLOCK BILATERAL;  Surgeon: Dickson Corrigan MD;  [facility]    INJECT TRIGGER POINT Bilateral 9/8/2022    Procedure: Abdominal trigger point injection with ultrasound;  Surgeon: Monika Mahajan MD;  [facility]    INJECT TRIGGER POINT SINGLE / MULTIPLE 3 OR MORE MUSCLES Right 11/15/2022    Procedure: Trigger point injections right abdomen  with ultrasound guidance;  Surgeon: Monika Mahajan MD;  [facility]    IR CHEST PORT PLACEMENT < 5 YRS OF AGE  6/10/2022    laparoscopic pineda  1995    LAPAROSCOPIC HERNIORRHAPHY INCISIONAL N/A 8/23/2018    Procedure: LAPAROSCOPIC HERNIORRHAPHY INCISIONAL;  Laparoscopic Incisional Hernia Repair with Symbotex Mesh Implant;  Surgeon: Nestor Phoenix MD;  [facility]    LAPAROSCOPIC PANCREATECTOMY, TRANSPLANT AUTO ISLET CELL N/A 12/28/2016    Procedure: LAPAROSCOPIC PANCREATECTOMY, TRANSPLANT AUTO ISLET CELL;  Surgeon: Nestor Phoenix MD;  [facility]    LAPAROTOMY EXPLORATORY N/A 1/31/2023    Procedure: Exploratory Laparotomy, lysis of adhesions, Perforated J-Junostomy Resection, Replace J-Junostomy site;  Surgeon: Elver Bauer MD;  [facility]    LAPAROTOMY, LYSIS ADHESIONS, COMBINED N/A 1/31/2023    Procedure: Dilatation of jejunostomy feeding tube, track with placement of jejunostomy tube with fluoroscopy;  Surgeon: Elver Bauer MD;  [facility]    REMOVE AND REPLACE BREAST IMPLANT PROSTHESIS N/A 12/30/2022    Procedure: Exploratory Laparotomy, lysis of adhesions, small bowel resection. Placement of gastric jejunostomy for enteral feeding.;  Surgeon: Elver Bauer MD;  [facility]    REMOVE GASTROSTOMY TUBE ADULT N/A 1/28/2022    Procedure: REMOVAL, GASTROSTOMY TUBE, ADULT;  Surgeon: Mandeep Park MD;  [facility]    REPLACE GASTROJEJUNOSTOMY TUBE, PERCUTANEOUS N/A 9/7/2021    Procedure: GASTROJEJUNOSTOMY TUBE PLACEMENT, PERCUTANEOUS, WITH GASTROPEXY;  Surgeon: Mandeep Park MD;  [facility]    REPLACE GASTROJEJUNOSTOMY TUBE, PERCUTANEOUS N/A 9/22/2021    Procedure: REPLACEMENT, GASTROJEJUNOSTOMY TUBE, PERCUTANEOUS;  Surgeon: Zackery Montoya MD;  [facility]    REPLACE GASTROJEJUNOSTOMY TUBE, PERCUTANEOUS N/A 11/22/2022    Procedure: REPLACEMENT, GASTROJEJUNOSTOMY TUBE, PERCUTANEOUS;  Surgeon: Mandeep Park,  MD;  [facility]    REPLACE GASTROJEJUNOSTOMY TUBE, PERCUTANEOUS N/A 2022    Procedure: REPLACEMENT, GASTROJEJUNOSTOMY TUBE, PERCUTANEOUS with ENDOSCOPIC SUTURING.;  Surgeon: Mandeep Park MD;  [facility]    REPLACE JEJUNOSTOMY TUBE, PERCUTANEOUS N/A 9/10/2021    Procedure: UPPER ENDOSCOPY, REPLACEMENT OF PERCUTANEOUS GASTROJEJUNOSTOMY TUBE, T-TAG GASTROPEXY;  Surgeon: Zackery Montoya MD;  [facility]    REPLACE JEJUNOSTOMY TUBE, PERCUTANEOUS N/A 2021    Procedure: REPLACEMENT, JEJUNOSTOMY TUBE, PERCUTANEOUS;  Surgeon: Mandeep Park MD;  [facility]    REPLACE JEJUNOSTOMY TUBE, PERCUTANEOUS N/A 2023    Procedure: REPLACEMENT, JEJUNOSTOMY TUBE, PERCUTANEOUS;  Surgeon: Mandeep Park MD;  [facility]    transphenoidal pituitary resection      Z  DELIVERY ONLY      Z  DELIVERY ONLY      repeat c section with incidental cystotomy with repair    Z EXCIS PITUITARY,TRANSNASAL/SEPTAL      pituitary tumor removed for diabetes insipidus    Z TOTAL ABDOM HYSTERECTOMY      w/ bilateral salpingoophorectomy        Family History:  New changes since last visit:  none  Family History   Problem Relation Age of Onset    Lipids Mother     Hypertension Mother     Thyroid Disease Mother     Depression Mother     Angina Mother     GERD Mother     Skin Cancer Mother     Migraines Mother     Autoimmune Disease Mother     Hyperlipidemia Mother     Mental Illness Mother     Eye Disorder Father         cataract, detached retina    Myocardial Infarction Father 60    Lipids Father     Cerebrovascular Disease Father     Depression Father     Substance Abuse Father     Anesthesia Reaction Father         stroke right after surgery    Cataracts Father     Osteoarthritis Father     Ulcerative Colitis Father     Autoimmune Disease Father     Heart Disease Father     Hyperlipidemia Father     Mental Illness Father     Interpersonal Violence  Sister     Coronary Artery Disease Sister         angioplasty    GERD Sister     Substance Abuse Sister     Depression Sister     Thyroid Disease Sister     Eye Disorder Maternal Grandmother         cataract    Thyroid Disease Maternal Grandmother     Diabetes Maternal Grandfather     Eye Disorder Paternal Grandmother         cataract    Diabetes Paternal Grandmother     Eye Disorder Paternal Grandfather         cataract    Diabetes Paternal Grandfather     Substance Abuse Paternal Grandfather     Eye Disorder Son         ptosis    Depression Son     Anxiety Disorder Son     Heart Disease Paternal Aunt     Diabetes Paternal Aunt     Diabetes Paternal Uncle     Heart Disease Paternal Uncle     Depression Nephew     Anxiety Disorder Nephew     Thyroid Disease Nephew     Diabetes Type 2  Cousin         paternal cousin    Autoimmune Disease Cousin     Autoimmune Disease Sister     Depression Sister     Mental Illness Sister     Substance Abuse Sister     Thyroid Disease Sister     Depression Son     Mental Illness Son     Thyroid Disease Nephew        Social History:  Social History     Social History Narrative     with 3 children and a dog.  No smoking, etoh or drug use.  Worked as a  for [employer] in [place] in the past.  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     Physical Exam:  Vitals: /85 (BP Location: Right arm, Patient Position: Sitting, Cuff Size: Adult Regular)   Pulse 91   Resp 16   Wt 60.6 kg (133 lb 11.2 oz)   SpO2 98%   BMI 20.33 kg/m    BMI= Body mass index is 20.33 kg/m .  General:  Appearance is normal, no acute distress  HEENT:  NC/AT, sclera appear white  Neck:  No obvious thyromegaly  CV/Lungs:  Non distressed breathing  Skin:  No apparent rashes  Neuro:  Normal mental status  Psych:  Normal affect          Assessment:  1.  Post  pancreatectomy diabetes mellitus, s/p total pancreatectomy and islet autotransplant.  (ICD-10 E89.1)  2.  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   Gastroparesis  4.  Nausea, vomiting      Dinora is a 58 year old with history of chronic pancreatitis who is s/p total pancreatectomy and islet autotransplant.  She has been insulin independent with A1c in goal range.    Her major glycemic issue has been hypoglycemia.  Overall this is in reasonable control for her currently.  She does have episodes of hypoglycemia, however, and sometimes these follow a drop from a hyperglycemic excursion.  They are not particularly predictable and can occur rapidly, and are not necessarily food related as she does not eat much for nutrition now.  She catches them by wearing CGM and is able to intervene, and use mini glucagon if needed for faster recovery.  I don't have any other great treatments to offer to prevent these hypoglycemic episodes.  Although we have sometimes used diazoxide I don't think her hypoglycemia is frequent or profound enough to justify this, as the trade off will be that all glucoses are raised a bit and she will have modest hyperglycemia.  We will continue our current plan and follow.    Plan:  1.  Changes to current diabetes regimen:  Patient Instructions   1)  Continue to wear Yandy CGM for monitoring for hypoglycemia.    2)  Treatment of hypoglycemia with oral carbohydate and mini-doses of glucagon as needed.  Needs to get the 'old fashioned' glucagon kit in order to do mini dosing.    2.  Frequency of blood sugar checks:  Keep wearing Yandy-- this is much better for Dinora than fingersticks because with fingersticks we miss the really critical data of the post prandial excursions.    3.  Continue routine follow up for autoislet transplant patients:  Mixed meal test (6 mL/kg BoostHP to  max of 360 mL) at 3 months, 6 months, and once a year post transplant.  Hemoglobin A1c levels at these time points and quarterly.    4.  Other issues addressed today:  none    Follow up:  4 mos    Contact me for questions at [phone] or [phone].  Emergency number to reach pediatric endocrinology after hours is [phone].      Dr. Gloria Melissa MD  [facility]  Director of Research, Islet Auto-transplant Program  Phone:  [phone]  Electronically signed: July 25, 2023 at 10:10 AM            Review of the result(s) of each unique test - HbA1c, jim  40 minutes spent on the date of the encounter doing chart review, history and exam, documentation, downloading and reviewing CGM data,  and further activities per the note

## 2023-07-24 ENCOUNTER — VIRTUAL VISIT (OUTPATIENT)
Dept: GASTROENTEROLOGY | Facility: CLINIC | Age: 58
End: 2023-07-24
Payer: COMMERCIAL

## 2023-07-24 VITALS — SYSTOLIC BLOOD PRESSURE: 106 MMHG | DIASTOLIC BLOOD PRESSURE: 80 MMHG | BODY MASS INDEX: 19.77 KG/M2 | WEIGHT: 130 LBS

## 2023-07-24 DIAGNOSIS — R11.2 NAUSEA AND VOMITING, UNSPECIFIED VOMITING TYPE: Primary | ICD-10-CM

## 2023-07-24 PROCEDURE — 99215 OFFICE O/P EST HI 40 MIN: CPT | Mod: VID | Performed by: INTERNAL MEDICINE

## 2023-07-24 ASSESSMENT — PAIN SCALES - GENERAL: PAINLEVEL: MODERATE PAIN (5)

## 2023-07-24 NOTE — LETTER
7/24/2023         RE: Dinora Mcghee  [address] [facility] [address]        Dear Colleague,    Thank you for referring your patient, Dinora Mcghee, to the [facility] [place]. Please see a copy of my visit note below.    Dinora is following up regarding multiple issues post TP IAT feeding tube issues motility etc.  Overall she has been doing remarkably better over the last month and she has for perhaps a year.  She is following with [facility] pain clinic now which has been excellent for her with multidisciplinary approach including physical therapy psychology and among other things have put her on medical cannabis.  She is also eliminated vitamins and protein from her supplements all of the above have dramatically improved her nausea which has been a dominant symptom.  She has been able to get out and do things for the first time in a long time including going to a berry farm and picking blueberries going out on even some bicycle rides etc.  This is a dramatic turnaround for her.  She is up to 80 cc an hour of J-tube feeds at night still having leaking around the J-tube but is learned to live with that.  She does have abdominal pain issues as before but she is off opioids and its really likely motility related cramping as if strain to push through an obstruction.  She did bring up that she has been scheduled for a celiac plexus block at [facility] pain and although they were doing a wonderful job with everything else my recommendation is to avoid that as she does not have a pancreas this is not exactly typical visceral pain is motility related and one of the issues with celiac blocks as they can adversely affect GI motility.  Us and our experience has been that in active people without calcific chronic pancreatitis which she of course does not have with no pancreas but especially those with motility problems and will often exacerbate pain but certainly if it  works not for long-term since she has had so much setbacks from every anesthesia and intervention including GJ tube placement then direct GJ than surgical J-tube and then perforation and repair of a perforation and then replacement of tubes all of which is taking her so long to recover she is doing very well now relative do any time in the last year that I could remember do suggest that she hold off any interventions.  She did ask about whether she could go [place] I suggested she not do that as it would macerate and irritate her G and J tubes which has been one of her dominant problems impression is overall terrific improvemeny in that she is able to get out and enjoy activities for the first time in a long time and is having more good days than bad days    Plan  1) Change IV infusion fluid from NS back to LR  2) Follow-up as needed  3) Recommend cancel celiac plexus block at [facility] -would please send this note to her providers at [facility] pain clinic    Total 60 minutes of which 40 minutes focused on her medical problems, rest sharing experiences re [facility], etc.          Again, thank you for allowing me to participate in the care of your patient.      Sincerely,    Mandeep Park MD

## 2023-07-24 NOTE — NURSING NOTE
Is the patient currently in the state of [place]? YES    Visit mode:VIDEO    If the visit is dropped, the patient can be reconnected by: VIDEO VISIT: Text to cell phone: [phone]    Will anyone else be joining the visit? NO      How would you like to obtain your AVS? MyChart    Are changes needed to the allergy or medication list? NO    Reason for visit: Video Visit (Return)

## 2023-07-24 NOTE — PROGRESS NOTES
Dinora is following up regarding multiple issues post TP IAT feeding tube issues motility etc.  Overall she has been doing remarkably better over the last month and she has for perhaps a year.  She is following with [facility] pain clinic now which has been excellent for her with multidisciplinary approach including physical therapy psychology and among other things have put her on medical cannabis.  She is also eliminated vitamins and protein from her supplements all of the above have dramatically improved her nausea which has been a dominant symptom.  She has been able to get out and do things for the first time in a long time including going to a berry farm and picking blueberries going out on even some bicycle rides etc.  This is a dramatic turnaround for her.  She is up to 80 cc an hour of J-tube feeds at night still having leaking around the J-tube but is learned to live with that.  She does have abdominal pain issues as before but she is off opioids and its really likely motility related cramping as if strain to push through an obstruction.  She did bring up that she has been scheduled for a celiac plexus block at [facility] pain and although they were doing a wonderful job with everything else my recommendation is to avoid that as she does not have a pancreas this is not exactly typical visceral pain is motility related and one of the issues with celiac blocks as they can adversely affect GI motility.  Us and our experience has been that in active people without calcific chronic pancreatitis which she of course does not have with no pancreas but especially those with motility problems and will often exacerbate pain but certainly if it works not for long-term since she has had so much setbacks from every anesthesia and intervention including GJ tube placement then direct GJ than surgical J-tube and then perforation and repair of a perforation and then replacement of tubes all of which is taking her so long  to recover she is doing very well now relative do any time in the last year that I could remember do suggest that she hold off any interventions.  She did ask about whether she could go [place] I suggested she not do that as it would macerate and irritate her G and J tubes which has been one of her dominant problems impression is overall terrific improvemeny in that she is able to get out and enjoy activities for the first time in a long time and is having more good days than bad days    Plan  1) Change IV infusion fluid from NS back to LR  2) Follow-up as needed  3) Recommend cancel celiac plexus block at [facility] -would please send this note to her providers at [facility] pain clinic    Total 60 minutes of which 40 minutes focused on her medical problems, rest sharing experiences re [facility], etc.

## 2023-07-31 DIAGNOSIS — K31.84 GASTROPARESIS: ICD-10-CM

## 2023-08-01 ENCOUNTER — HOSPITAL ENCOUNTER (EMERGENCY)
Facility: CLINIC | Age: 58
Discharge: HOME | End: 2023-08-01
Attending: EMERGENCY MEDICINE | Admitting: EMERGENCY MEDICINE
Payer: COMMERCIAL

## 2023-08-01 ENCOUNTER — APPOINTMENT (OUTPATIENT)
Dept: GENERAL RADIOLOGY | Facility: CLINIC | Age: 58
End: 2023-08-01
Attending: INTERNAL MEDICINE
Payer: COMMERCIAL

## 2023-08-01 VITALS
DIASTOLIC BLOOD PRESSURE: 83 MMHG | SYSTOLIC BLOOD PRESSURE: 136 MMHG | RESPIRATION RATE: 16 BRPM | HEART RATE: 78 BPM | HEIGHT: 68 IN | WEIGHT: 130 LBS | BODY MASS INDEX: 19.7 KG/M2 | OXYGEN SATURATION: 100 % | TEMPERATURE: 98 F

## 2023-08-01 DIAGNOSIS — K94.29 OTHER COMPLICATIONS OF GASTROSTOMY (H): Primary | ICD-10-CM

## 2023-08-01 DIAGNOSIS — Z78.9 ENCOUNTER FOR GASTROJEJUNAL (GJ) TUBE PLACEMENT: ICD-10-CM

## 2023-08-01 LAB
ANION GAP SERPL CALCULATED.3IONS-SCNC: 10 MMOL/L (ref 7–15)
BASOPHILS # BLD AUTO: 0.1 10E3/UL (ref 0–0.2)
BASOPHILS NFR BLD AUTO: 1 %
BUN SERPL-MCNC: 11.5 MG/DL (ref 6–20)
CALCIUM SERPL-MCNC: 9.4 MG/DL (ref 8.6–10)
CHLORIDE SERPL-SCNC: 102 MMOL/L (ref 98–107)
CREAT SERPL-MCNC: 0.55 MG/DL (ref 0.51–0.95)
DEPRECATED HCO3 PLAS-SCNC: 27 MMOL/L (ref 22–29)
EOSINOPHIL # BLD AUTO: 0.2 10E3/UL (ref 0–0.7)
EOSINOPHIL NFR BLD AUTO: 3 %
ERYTHROCYTE [DISTWIDTH] IN BLOOD BY AUTOMATED COUNT: 13.3 % (ref 10–15)
GFR SERPL CREATININE-BSD FRML MDRD: >90 ML/MIN/1.73M2
GLUCOSE SERPL-MCNC: 116 MG/DL (ref 70–99)
HCT VFR BLD AUTO: 39 % (ref 35–47)
HGB BLD-MCNC: 12.5 G/DL (ref 11.7–15.7)
IMM GRANULOCYTES # BLD: 0 10E3/UL
IMM GRANULOCYTES NFR BLD: 0 %
INR PPP: 1 (ref 0.85–1.15)
LYMPHOCYTES # BLD AUTO: 2 10E3/UL (ref 0.8–5.3)
LYMPHOCYTES NFR BLD AUTO: 36 %
MCH RBC QN AUTO: 30.7 PG (ref 26.5–33)
MCHC RBC AUTO-ENTMCNC: 32.1 G/DL (ref 31.5–36.5)
MCV RBC AUTO: 96 FL (ref 78–100)
MONOCYTES # BLD AUTO: 0.7 10E3/UL (ref 0–1.3)
MONOCYTES NFR BLD AUTO: 13 %
NEUTROPHILS # BLD AUTO: 2.7 10E3/UL (ref 1.6–8.3)
NEUTROPHILS NFR BLD AUTO: 47 %
NRBC # BLD AUTO: 0 10E3/UL
NRBC BLD AUTO-RTO: 0 /100
PLATELET # BLD AUTO: 376 10E3/UL (ref 150–450)
POTASSIUM SERPL-SCNC: 4.6 MMOL/L (ref 3.4–5.3)
PROVATION GI EXAM: NORMAL
RBC # BLD AUTO: 4.07 10E6/UL (ref 3.8–5.2)
SODIUM SERPL-SCNC: 139 MMOL/L (ref 136–145)
WBC # BLD AUTO: 5.7 10E3/UL (ref 4–11)

## 2023-08-01 PROCEDURE — 96361 HYDRATE IV INFUSION ADD-ON: CPT | Mod: 59 | Performed by: EMERGENCY MEDICINE

## 2023-08-01 PROCEDURE — 96360 HYDRATION IV INFUSION INIT: CPT | Mod: 59 | Performed by: EMERGENCY MEDICINE

## 2023-08-01 PROCEDURE — 49450 REPLACE G/C TUBE PERC: CPT | Performed by: INTERNAL MEDICINE

## 2023-08-01 PROCEDURE — 250N000011 HC RX IP 250 OP 636: Performed by: INTERNAL MEDICINE

## 2023-08-01 PROCEDURE — 36415 COLL VENOUS BLD VENIPUNCTURE: CPT | Performed by: EMERGENCY MEDICINE

## 2023-08-01 PROCEDURE — 99285 EMERGENCY DEPT VISIT HI MDM: CPT | Mod: 25 | Performed by: EMERGENCY MEDICINE

## 2023-08-01 PROCEDURE — 76000 FLUOROSCOPY <1 HR PHYS/QHP: CPT | Mod: TC

## 2023-08-01 PROCEDURE — 85025 COMPLETE CBC W/AUTO DIFF WBC: CPT | Performed by: EMERGENCY MEDICINE

## 2023-08-01 PROCEDURE — 258N000003 HC RX IP 258 OP 636: Performed by: EMERGENCY MEDICINE

## 2023-08-01 PROCEDURE — G0500 MOD SEDAT ENDO SERVICE >5YRS: HCPCS | Performed by: INTERNAL MEDICINE

## 2023-08-01 PROCEDURE — 99285 EMERGENCY DEPT VISIT HI MDM: CPT | Performed by: EMERGENCY MEDICINE

## 2023-08-01 PROCEDURE — 99153 MOD SED SAME PHYS/QHP EA: CPT | Performed by: INTERNAL MEDICINE

## 2023-08-01 PROCEDURE — 80048 BASIC METABOLIC PNL TOTAL CA: CPT | Performed by: EMERGENCY MEDICINE

## 2023-08-01 PROCEDURE — 85610 PROTHROMBIN TIME: CPT | Performed by: EMERGENCY MEDICINE

## 2023-08-01 PROCEDURE — 49451 REPLACE DUOD/JEJ TUBE PERC: CPT | Performed by: INTERNAL MEDICINE

## 2023-08-01 RX ORDER — FENTANYL CITRATE 50 UG/ML
INJECTION, SOLUTION INTRAMUSCULAR; INTRAVENOUS PRN
Status: DISCONTINUED | OUTPATIENT
Start: 2023-08-01 | End: 2023-08-01 | Stop reason: HOSPADM

## 2023-08-01 RX ORDER — SODIUM CHLORIDE 9 MG/ML
INJECTION, SOLUTION INTRAVENOUS CONTINUOUS
Status: DISCONTINUED | OUTPATIENT
Start: 2023-08-01 | End: 2023-08-01 | Stop reason: HOSPADM

## 2023-08-01 RX ORDER — DIPHENHYDRAMINE HYDROCHLORIDE 50 MG/ML
INJECTION INTRAMUSCULAR; INTRAVENOUS PRN
Status: DISCONTINUED | OUTPATIENT
Start: 2023-08-01 | End: 2023-08-01 | Stop reason: HOSPADM

## 2023-08-01 RX ADMIN — SODIUM CHLORIDE: 9 INJECTION, SOLUTION INTRAVENOUS at 07:47

## 2023-08-01 ASSESSMENT — ACTIVITIES OF DAILY LIVING (ADL)
ADLS_ACUITY_SCORE: 37
ADLS_ACUITY_SCORE: 37
ADLS_ACUITY_SCORE: 35
ADLS_ACUITY_SCORE: 35
ADLS_ACUITY_SCORE: 37

## 2023-08-01 NOTE — ED TRIAGE NOTES
Patient to ER with complaints of pulling J-Tube out at around 4:45 am this morning. She was instructed by GI to insert a 10fr cath with a 10ml balloon.      Triage Assessment       Row Name 08/01/23 0650       Triage Assessment (Adult)    Airway WDL WDL       Respiratory WDL    Respiratory WDL WDL       Skin Circulation/Temperature WDL    Skin Circulation/Temperature WDL WDL       Cardiac WDL    Cardiac WDL WDL       Peripheral/Neurovascular WDL    Peripheral Neurovascular WDL WDL       Cognitive/Neuro/Behavioral WDL    Cognitive/Neuro/Behavioral WDL WDL       Iraida Coma Scale    Best Eye Response 4-->(E4) spontaneous    Best Motor Response 6-->(M6) obeys commands    Best Verbal Response 5-->(V5) oriented    Iraida Coma Scale Score 15

## 2023-08-01 NOTE — CONSULTS
GASTROENTEROLOGY CONSULTATION      Date of Admission:  8/1/2023  Requesting physician: Starla Garibay MD            Reason for Consultation:       J-tube fell out              ASSESSMENT AND RECOMMENDATIONS:   Assessment:  Dinora Mcghee is a 58 year old female who presents to ED after her J tube fell out this morning. She has a history of chronic pancreatitis (ERCP associated) s/p TPIAT 2016 c/b DM, gastroparesis and possibly intestinal dysmotility currently with PEG (for venting) and direct PEJ for feeding, chronic malnutrition on TF and TPN.        # PEJ dislodgement  # Malnutrition on J tube feeding   Discussed with Dr. Park who will replaced the J tube today with endoscopic and fluoroscopic guidance.       Recommendations  - Plan for direct J tube replacement today with Dr. Park  - Continue NPO        Thank you for involving us in this patient's care. Please do not hesitate to contact the GI service with any questions or concerns.     Pt care plan discussed with Dr. Dodson and Dr. Park, GI staff physician.      Cheri Chaidez MD PhD  GI Fellow   [phone]   -------------------------------------------------------------------------------------------------------------------           History of Present Illness:   Dinora Mcghee is a 58 year old female who presents to ED after her J tube fell out this morning. She has a history of chronic pancreatitis (ERCP associated) s/p TPIAT 2016 c/b DM, gastroparesis and possibly intestinal dysmotility currently with PEG (for venting) and direct PEJ for feeding, chronic malnutrition on TF and TPN.     She reports her J-tube fell out this morning around 5.  She put a 10 Croatian feeding tube into the tract to keep it open.             Past Medical History:   Reviewed and edited as appropriate  Past Medical History:   Diagnosis Date    Allergic rhinitis, cause unspecified     Allergy to other foods     strawberries, apples, celeries, ailce, watermelon     Arthritis     left knee    Choledocholithiasis     long after cholecystectomy    Chronic abdominal pain     Chronic constipation     Chronic nausea     Chronic pancreatitis (H)     Degeneration of lumbar or lumbosacral intervertebral disc     Esophageal reflux     w/ hiatal hernia    Gastroparesis     Hiatal hernia     History of pituitary adenoma     s/p resection    Hypothyroidism     Migraines     Mild hyperlipidemia     On tube feeding diet     presence of GJ tube    Pancreatic disease     PD stricture, 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 endometriosis growth    APPENDECTOMY       SECTION      COLONOSCOPY      COLONOSCOPY N/A 2023    Procedure: COLONOSCOPY, WITH POLYPECTOMY AND BIOPSY;  Surgeon: Percy Chamorro MD;  Location:  GI    ENDOSCOPIC INSERTION TUBE GASTROSTOMY N/A 2021    Procedure: Gastrojejonostomy placement with jejunopexy, PEG tube exchange;  Surgeon: Zackery Montoya MD;  [facility]    ENDOSCOPIC RETROGRADE CHOLANGIOPANCREATOGRAM N/A 2015    Procedure: ENDOSCOPIC RETROGRADE CHOLANGIOPANCREATOGRAM;  Surgeon: Mandeep Park MD;  [facility]    ENDOSCOPIC RETROGRADE CHOLANGIOPANCREATOGRAM N/A 2016    Procedure: COMBINED ENDOSCOPIC RETROGRADE CHOLANGIOPANCREATOGRAPHY, PLACE TUBE/STENT;  Surgeon: Mandeep Park MD;  [facility]    ENDOSCOPIC RETROGRADE CHOLANGIOPANCREATOGRAM N/A 3/17/2016    Procedure: COMBINED ENDOSCOPIC RETROGRADE CHOLANGIOPANCREATOGRAPHY, REMOVE FOREIGN BODY OR STENT/TUBE;  Surgeon: Mandeep Park MD;  [facility]    ENDOSCOPIC  RETROGRADE CHOLANGIOPANCREATOGRAM N/A 8/2/2016    Procedure: COMBINED ENDOSCOPIC RETROGRADE CHOLANGIOPANCREATOGRAPHY, PLACE TUBE/STENT;  Surgeon: Mandeep Park MD;  [facility]    ENDOSCOPIC RETROGRADE CHOLANGIOPANCREATOGRAM N/A 8/26/2016    Procedure: COMBINED ENDOSCOPIC RETROGRADE CHOLANGIOPANCREATOGRAPHY, REMOVE FOREIGN BODY OR STENT/TUBE;  Surgeon: Mandeep Park MD;  [facility]    ENDOSCOPIC ULTRASOUND UPPER GASTROINTESTINAL TRACT (GI) N/A 10/3/2016    Procedure: ENDOSCOPIC ULTRASOUND, ESOPHAGOSCOPY / UPPER GASTROINTESTINAL TRACT (GI);  Surgeon: Guru Jose Rodas MD;  [facility]    ENTEROSCOPY SMALL BOWEL N/A 2/3/2022    Procedure: ENTEROSCOPY with possible fistula closure;  Surgeon: Francisco Dodson MD;  Location:  GI    ESOPHAGOSCOPY, GASTROSCOPY, DUODENOSCOPY (EGD), COMBINED N/A 6/24/2015    Procedure: COMBINED ESOPHAGOSCOPY, GASTROSCOPY, DUODENOSCOPY (EGD), REMOVE FOREIGN BODY;  Surgeon: Mandeep Park MD;  [facility]    ESOPHAGOSCOPY, GASTROSCOPY, DUODENOSCOPY (EGD), COMBINED N/A 10/25/2015    Procedure: COMBINED ESOPHAGOSCOPY, GASTROSCOPY, DUODENOSCOPY (EGD);  Surgeon: Sammy Amaro MD;  [facility]    ESOPHAGOSCOPY, GASTROSCOPY, DUODENOSCOPY (EGD), COMBINED N/A 10/25/2015    Procedure: COMBINED ESOPHAGOSCOPY, GASTROSCOPY, DUODENOSCOPY (EGD), BIOPSY SINGLE OR MULTIPLE;  Surgeon: Sammy Amaro MD;  Location: [facility]    ESOPHAGOSCOPY, GASTROSCOPY, DUODENOSCOPY (EGD), COMBINED N/A 1/31/2023    Procedure: ESOPHAGOGASTRODUODENOSCOPY (EGD) with peg replacement ;  Surgeon: Mandeep Park MD;  [facility]    ESOPHAGOSCOPY, GASTROSCOPY, DUODENOSCOPY (EGD), DILATATION, COMBINED      EXCISE LESION TRUNK N/A 4/17/2017    Procedure: EXCISE LESION TRUNK;  Removal of Abdominal Foreign Body;  Surgeon: Nestor Phoenix MD;  [facility]    HC ESOPH/GAS REFLUX TEST W NASAL IMPED >1 HR N/A 11/19/2015    Procedure: ESOPHAGEAL  IMPEDENCE FUNCTION TEST WITH 24 HOUR PH GREATER THAN 1 HOUR;  Surgeon: Thiago Apple MD;  [facility]    HC UGI ENDOSCOPY DIAG W BIOPSY  9/17/08     [facility] ENDOSCOPY DIAG W BIOPSY  9/27/12     [facility] ENDOSCOPY W ESOPHAGEAL DILATION BALLOON <30MM  9/17/08    HC UGI ENDOSCOPY W EUS N/A 5/5/2015    Procedure: COMBINED ENDOSCOPIC ULTRASOUND, ESOPHAGOSCOPY, GASTROSCOPY, DUODENOSCOPY (EGD);  Surgeon: Wm Dueñas MD;  [facility]    HC WRIST ARTHROSCOP,RELEASE XVERS LIG Bilateral 12/17/08    INJECT TRANSVERSUS ABDOMINIS PLANE (TAP) BLOCK BILATERAL Left 9/22/2016    Procedure: INJECT TRANSVERSUS ABDOMINIS PLANE (TAP) BLOCK BILATERAL;  Surgeon: Dickson Corrigan MD;  [facility]    INJECT TRIGGER POINT Bilateral 9/8/2022    Procedure: Abdominal trigger point injection with ultrasound;  Surgeon: Monika Mahajan MD;  [facility]    INJECT TRIGGER POINT SINGLE / MULTIPLE 3 OR MORE MUSCLES Right 11/15/2022    Procedure: Trigger point injections right abdomen with ultrasound guidance;  Surgeon: Monika Mahajan MD;  [facility]    IR CHEST PORT PLACEMENT < 5 YRS OF AGE  6/10/2022    laparoscopic pineda  1995    LAPAROSCOPIC HERNIORRHAPHY INCISIONAL N/A 8/23/2018    Procedure: LAPAROSCOPIC HERNIORRHAPHY INCISIONAL;  Laparoscopic Incisional Hernia Repair with Symbotex Mesh Implant;  Surgeon: Nestor Phoenix MD;  [facility]    LAPAROSCOPIC PANCREATECTOMY, TRANSPLANT AUTO ISLET CELL N/A 12/28/2016    Procedure: LAPAROSCOPIC PANCREATECTOMY, TRANSPLANT AUTO ISLET CELL;  Surgeon: Nestor Phoenix MD;  [facility]    LAPAROTOMY EXPLORATORY N/A 1/31/2023    Procedure: Exploratory Laparotomy, lysis of adhesions, Perforated J-Junostomy Resection, Replace J-Junostomy site;  Surgeon: Elver Bauer MD;  [facility]    LAPAROTOMY, LYSIS ADHESIONS, COMBINED N/A 1/31/2023    Procedure: Dilatation of jejunostomy feeding tube, track with placement of jejunostomy tube with fluoroscopy;   Surgeon: Elver Bauer MD;  [facility]    REMOVE AND REPLACE BREAST IMPLANT PROSTHESIS N/A 2022    Procedure: Exploratory Laparotomy, lysis of adhesions, small bowel resection. Placement of gastric jejunostomy for enteral feeding.;  Surgeon: Elver Bauer MD;  [facility]    REMOVE GASTROSTOMY TUBE ADULT N/A 2022    Procedure: REMOVAL, GASTROSTOMY TUBE, ADULT;  Surgeon: Mandeep Park MD;  [facility]    REPLACE GASTROJEJUNOSTOMY TUBE, PERCUTANEOUS N/A 2021    Procedure: GASTROJEJUNOSTOMY TUBE PLACEMENT, PERCUTANEOUS, WITH GASTROPEXY;  Surgeon: Mandeep Park MD;  [facility]    REPLACE GASTROJEJUNOSTOMY TUBE, PERCUTANEOUS N/A 2021    Procedure: REPLACEMENT, GASTROJEJUNOSTOMY TUBE, PERCUTANEOUS;  Surgeon: Zackery Montoya MD;  [facility]    REPLACE GASTROJEJUNOSTOMY TUBE, PERCUTANEOUS N/A 2022    Procedure: REPLACEMENT, GASTROJEJUNOSTOMY TUBE, PERCUTANEOUS;  Surgeon: Mandeep Park MD;  [facility]    REPLACE GASTROJEJUNOSTOMY TUBE, PERCUTANEOUS N/A 2022    Procedure: REPLACEMENT, GASTROJEJUNOSTOMY TUBE, PERCUTANEOUS with ENDOSCOPIC SUTURING.;  Surgeon: Mandeep Park MD;  [facility]    REPLACE JEJUNOSTOMY TUBE, PERCUTANEOUS N/A 9/10/2021    Procedure: UPPER ENDOSCOPY, REPLACEMENT OF PERCUTANEOUS GASTROJEJUNOSTOMY TUBE, T-TAG GASTROPEXY;  Surgeon: Zackery Montoya MD;  [facility]    REPLACE JEJUNOSTOMY TUBE, PERCUTANEOUS N/A 2021    Procedure: REPLACEMENT, JEJUNOSTOMY TUBE, PERCUTANEOUS;  Surgeon: Mandeep Park MD;  [facility]    REPLACE JEJUNOSTOMY TUBE, PERCUTANEOUS N/A 2023    Procedure: REPLACEMENT, JEJUNOSTOMY TUBE, PERCUTANEOUS;  Surgeon: Mandeep Park MD;  [facility]    transphenoidal pituitary resection      Z  DELIVERY ONLY      [facility]  DELIVERY ONLY      repeat c section with incidental cystotomy with repair     Z EXCIS PITUITARY,TRANSNASAL/SEPTAL      pituitary tumor removed for diabetes insipidus    Z TOTAL ABDOM HYSTERECTOMY      w/ bilateral salpingoophorectomy             Social History:   Reviewed and edited as appropriate  Social History     Socioeconomic History    Marital status:      Spouse name: Norris    Number of children: 3    Years of education: 16    Highest education level: Not on file   Occupational History    Occupation: director     Employer: [employer]   Tobacco Use    Smoking status: Former     Packs/day: 0.50     Years: 6.00     Pack years: 3.00     Types: Cigarettes     Start date: 1985     Quit date: 1992     Years since quittin.6    Smokeless tobacco: Not on file    Tobacco comments:     no 2nd hand   Substance and Sexual Activity    Alcohol use: Not Currently     Alcohol/week: 3.0 - 6.0 standard drinks of alcohol     Types: 1 - 2 Glasses of wine, 1 - 2 Cans of beer, 1 - 2 Shots of liquor per week     Comment: none since [facility]    Drug use: No     Comment: medical canabis tincture and vape    Sexual activity: Not Currently     Partners: Male     Birth control/protection: Post-menopausal     Comment: Hystectomy    Other Topics Concern    Parent/sibling w/ CABG, MI or angioplasty before 65F 55M? No     Service Not Asked    Blood Transfusions No    Caffeine Concern Not Asked    Occupational Exposure Not Asked    Hobby Hazards Not Asked    Sleep Concern Not Asked    Stress Concern Not Asked    Weight Concern Not Asked    Special Diet Not Asked    Back Care Not Asked    Exercise Not Asked    Bike Helmet Not Asked    Seat Belt Yes    Self-Exams Not Asked   Social History Narrative     with 3 children and a dog.  No smoking, etoh or drug use.  Worked as a  for [employer] in [place] in the past.  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     Social Determinants of Health     Financial Resource Strain: Not on file    Food Insecurity: Not on file   Transportation Needs: Not on file   Physical Activity: Not on file   Stress: Not on file   Social Connections: Not on file   Intimate Partner Violence: Not on file   Housing Stability: Not on file            Family History:   Reviewed and edited as appropriate  Family History   Problem Relation Age of Onset    Lipids Mother     Hypertension Mother     Thyroid Disease Mother     Depression Mother     Angina Mother     GERD Mother     Skin Cancer Mother     Migraines Mother     Autoimmune Disease Mother     Hyperlipidemia Mother     Mental Illness Mother     Eye Disorder Father         cataract, detached retina    Myocardial Infarction Father 60    Lipids Father     Cerebrovascular Disease Father     Depression Father     Substance Abuse Father     Anesthesia Reaction Father         stroke right after surgery    Cataracts Father     Osteoarthritis Father     Ulcerative Colitis Father     Autoimmune Disease Father     Heart Disease Father     Hyperlipidemia Father     Mental Illness Father     Interpersonal Violence Sister     Coronary Artery Disease Sister         angioplasty    GERD Sister     Substance Abuse Sister     Depression Sister     Thyroid Disease Sister     Eye Disorder Maternal Grandmother         cataract    Thyroid Disease Maternal Grandmother     Diabetes Maternal Grandfather     Eye Disorder Paternal Grandmother         cataract    Diabetes Paternal Grandmother     Eye Disorder Paternal Grandfather         cataract    Diabetes Paternal Grandfather     Substance Abuse Paternal Grandfather     Eye Disorder Son         ptosis    Depression Son     Anxiety Disorder Son     Heart Disease Paternal Aunt     Diabetes Paternal Aunt     Diabetes Paternal Uncle     Heart Disease Paternal Uncle     Depression Nephew     Anxiety Disorder Nephew     Thyroid Disease Nephew     Diabetes Type 2  Cousin         paternal cousin    Autoimmune Disease Cousin     Autoimmune Disease Sister   "   Depression Sister     Mental Illness Sister     Substance Abuse Sister     Thyroid Disease Sister     Depression Son     Mental Illness Son     Thyroid Disease Nephew               Allergies:   Reviewed and edited as appropriate     Allergies   Allergen Reactions    Apple Juice Anaphylaxis    Corticosteroids Other (See Comments)     All oral, IV and injectable steroids are contraindicated (unless in life threatening situations) in [facility] transplant recipients. They can cause irreversible loss of islet cell function. Please contact patient's transplant care coordinator ADI Gaffney RN at [phone]/pager [phone] and/or endocrinologist prior to administration.      Depakote [Divalproex Sodium] Other (See Comments)     Chest pain    Zithromax [Azithromycin Dihydrate] Anaphylaxis     Noted in 4/7/08 ER    Bromfenac      Other reaction(s): Headache    Codeine      Other reaction(s): Hallucinations    Darvocet [Propoxyphene N-Apap] Itching    Morphine Nausea and Vomiting and Rash    Nalbuphine Hcl Rash     RASH :nubaine    Zosyn [Piperacillin-Tazobactam In Dex] Rash     Possible allergy, did have a diffuse rash that seemed drug related but could have also been related to soap in the hospital.     Bactrim [Sulfamethoxazole W-Trimethoprim] Other (See Comments) and Nausea and Vomiting     Severely low liver function.    Statins Other (See Comments)     Previous liver issues, risks vs benefits felt to not warrant statin.  Discussed Oct 2022 visit    Tape [Adhesive Tape] Blisters    Tramadol     Zofran [Ondansetron] Other (See Comments)     migraine    Flagyl [Metronidazole] Hives and Rash            Medications:   (Not in a hospital admission)            Review of Systems:     A complete 10 point review of systems was performed and is negative except as noted in the HPI           Physical Exam:   /78   Pulse 97   Temp 98  F (36.7  C) (Oral)   Resp 18   Ht 1.727 m (5' 8\")   Wt 59 kg (130 lb)   SpO2 " 98%   BMI 19.77 kg/m    Wt:   Wt Readings from Last 2 Encounters:   08/01/23 59 kg (130 lb)   07/24/23 59 kg (130 lb)      Physical exam is limited given patient was in the lobby of the ED  Constitutional: No acute distress, resting comfortably in bed  Eyes: Sclera anicteric  Ears/nose/mouth/throat: Moist mucus membranes, hearing intact  Neck: supple  Respiratory: Breathing comfortably on room air           Data:   Labs and imaging below were independently reviewed and interpreted    BMP  Recent Labs   Lab 08/01/23  0743      POTASSIUM 4.6   CHLORIDE 102   KASSI 9.4   CO2 27   BUN 11.5   CR 0.55   *     CBC  Recent Labs   Lab 08/01/23  0743   WBC 5.7   RBC 4.07   HGB 12.5   HCT 39.0   MCV 96   MCH 30.7   MCHC 32.1   RDW 13.3        INR  Recent Labs   Lab 08/01/23  0743   INR 1.00     LFTsNo lab results found in last 7 days.   PANCNo lab results found in last 7 days.    Imaging: None    Endoscopy: reviewed last enteric tube exchange.

## 2023-08-01 NOTE — DISCHARGE INSTRUCTIONS
"Please follow up with your GI doctor for further care.            DISCHARGE INSTRUCTIONS  Gastrostomy Feeding Tube Insertion      IMPORTANT: As you prepare for discharge, the following information will help you return to your best level of health.               This Information Is About Your Follow Up Care  Call your doctor if you do not get better. Call sooner if you feel worse. You can reach your doctor by calling their clinic phone number.                                    This Information Is About Procedures    GASTROSTOMY FEEDING TUBE INSERTION.  A gastrostomy feeding tube is a small flexible tube. It is placed in the stomach through the abdominal wall. The tube is used to give you liquid food while you are not able to eat normally. One type of gastrostomy feeding tube is called a \"PEG\" tube (percutaneous endoscopy gastrostomy).      When you go home, follow these instructions:  Follow the feeding instructions given to you by your doctor or dietician.  Sit up during feedings, and for 1-1   hours after each feeding.  It is okay to bathe and shower normally.  Use mild soap around the tube.    Keep the skin around the tube dry and clean.   Handle the tube carefully so you do not pull it out..   Return to your normal activities as soon as you are able.  Do not wear tight clothing over the site of the tube.   Check the skin around the tube every day for signs of redness, swelling, or drainage.  Avoid using aspirin.  Don t smoke.    Call your doctor if:  The skin around your tube is red or swollen.  There is drainage around your tube.  There is bleeding around your tube.  Your tube comes out.  You have a fever greater than 101 degrees.  You have nausea, vomiting, constipation, or bloating.  You have questions or concerns about your feedings or your gastrostomy feeding tube.      This Information Is About Your Illness and Diagnosis        GASTRIC OUTLET OBSTRUCTION    Gastric outlet obstruction is the blockage " "between the stomach and intestines. The obstruction usually occurs in the muscular channel of the pyloris (the outlet of the stomach), which is where the contents of the stomach empty into the intestine.    Symptoms usually include upper abdominal pain, nausea, vomiting after eating, bloating, weight loss, and feeling full after eating less than usual.    Causes of gastric outlet obstruction may include:  Infection and inflammation   Peptic ulcer disease or a pyloric ulcer   Polyps (non-cancerous growths) in the stomach   Cancer of the stomach or pancreas        What further tests and treatments may need (if they haven't been done already)?  You may need an upper GI contrast study. This is a procedure in which you swallow barium, then we take x-rays. Barium shows up on the x-rays so we are able to see exactly where your blockage is located.  We may need to insert another nasogastric tube through your nose that will be guided down to your stomach. This will be attached to a pump that will release the pressure in your stomach.  Once your stomach has decompressed, we may perform an endoscopy or gastroscopy. An endoscopy is a procedure in which the doctor inserts an \"endoscope\" (a flexible, lighted tube with a tiny video camera on the end) through your mouth and passes it into your stomach. This allows the doctor to closely examine the blockage in your stomach. Your doctor may take a biopsy or a small sample of the blockage.  You will receive medicines to treat your specific cause of gastric outlet obstruction.  You may have a procedure called a balloon dilatation. In this procedure, the doctor inserts an endoscope that contains a deflated balloon into your stomach. He or she will pass the endoscope to the area of the blockage, then inflate the balloon.  You may have a procedure in which a stent is placed in the location of the blockage. This is similar to the balloon dilatation, except the doctor places a thin, hollow " "tube (\"stent\") into the area of the blockage to keep the area open.  Surgery may be required to open or remove the blockage.    At home, please follow these instructions:    Avoid foods that upset your stomach.    Slowly return to your normal activity.    Do not smoke. Smoking greatly irritates the lining of the esophagus and delays healing.    Avoid drinking alcohol and caffeine (coffee, tea, shima or chocolate).    Do not take any non-steroidal antiinflammatory drugs also known as NSAIDs. These include aspirin, ibuprofen (Motrin, Advil), naproxen (Aleve), others.  Take any newly-prescribed medicines exactly as directed by your doctor.    Call your doctor if you have:  Pain not helped by your pain medicine.  New or increased blood in your bowel movements (black, dark or bright red).    New or increased pain.  A faint feeling.  Any new problems or concerns.                                                                 "

## 2023-08-01 NOTE — ED PROVIDER NOTES
ED Provider Note  [facility]      History     Chief Complaint   Patient presents with    Gtube Problem     HPI  Dinora Mcghee is a 58 year old woman with history including gallstone s/p cholecystectomy (1996), pancreatectomy s/p autoislet cell tansplant, multiple ERCP c/b recurrent pancreatitis, gastroparesis c/b chronic nausea and vomiting, chronic malnutrition on TF who presents to the ER due to J-tube falling out.  Patient says that normally she does feeds overnight.  She noticed the tube that fell out earlier today.  She called the GI doctor on-call who told her to put something in the opening.  Patient said that she put a tube that she had at home.  Patient has a separate G-tube.  Patient follows with a pain doctor.  Patient denies any new abdominal pain at this time.  She notes that she is unable to get her GJ tube placed by interventional radiology.  Says that the tube needs to be replaced by the GI service in the operating room.  She notes that this tube was placed in 2023.  Denies any other new symptoms.  Is accompanied in the ER with her significant other.    Past Medical History  Past Medical History:   Diagnosis Date    Allergic rhinitis, cause unspecified     Allergy to other foods     strawberries, apples, celeries, alice, watermelon    Arthritis     left knee    Choledocholithiasis     long after cholecystectomy    Chronic abdominal pain     Chronic constipation     Chronic nausea     Chronic pancreatitis (H)     Degeneration of lumbar or lumbosacral intervertebral disc     Esophageal reflux     w/ hiatal hernia    Gastroparesis     Hiatal hernia     History of pituitary adenoma     s/p resection    Hypothyroidism     Migraines     Mild hyperlipidemia     On tube feeding diet     presence of GJ tube    Pancreatic disease     PD stricture, 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 endometriosis growth    APPENDECTOMY       SECTION      COLONOSCOPY      COLONOSCOPY N/A 2023    Procedure: COLONOSCOPY, WITH POLYPECTOMY AND BIOPSY;  Surgeon: Percy Chamorro MD;  [facility]: [facility]    ENDOSCOPIC INSERTION TUBE GASTROSTOMY N/A 2021    Procedure: Gastrojejonostomy placement with jejunopexy, PEG tube exchange;  Surgeon: Zackery Montoya MD;  [facility]    ENDOSCOPIC RETROGRADE CHOLANGIOPANCREATOGRAM N/A 2015    Procedure: ENDOSCOPIC RETROGRADE CHOLANGIOPANCREATOGRAM;  Surgeon: Mandeep Park MD;  [facility]    ENDOSCOPIC RETROGRADE CHOLANGIOPANCREATOGRAM N/A 2016    Procedure: COMBINED ENDOSCOPIC RETROGRADE CHOLANGIOPANCREATOGRAPHY, PLACE TUBE/STENT;  Surgeon: Mandeep Park MD;  [facility]    ENDOSCOPIC RETROGRADE CHOLANGIOPANCREATOGRAM N/A 3/17/2016    Procedure: COMBINED ENDOSCOPIC RETROGRADE CHOLANGIOPANCREATOGRAPHY, REMOVE FOREIGN BODY OR STENT/TUBE;  Surgeon: Mandeep Park MD;  [facility]    ENDOSCOPIC RETROGRADE CHOLANGIOPANCREATOGRAM N/A 2016    Procedure: COMBINED ENDOSCOPIC RETROGRADE CHOLANGIOPANCREATOGRAPHY, PLACE TUBE/STENT;  Surgeon: Mandeep Park MD;  [facility]    ENDOSCOPIC RETROGRADE CHOLANGIOPANCREATOGRAM N/A 2016    Procedure: COMBINED ENDOSCOPIC RETROGRADE CHOLANGIOPANCREATOGRAPHY, REMOVE FOREIGN BODY OR STENT/TUBE;  Surgeon: Mandeep Park MD;  [facility]    ENDOSCOPIC ULTRASOUND UPPER GASTROINTESTINAL TRACT (GI) N/A 10/3/2016    Procedure: ENDOSCOPIC ULTRASOUND, ESOPHAGOSCOPY / UPPER GASTROINTESTINAL TRACT (GI);  Surgeon: Guru Jose Rodas MD;  [facility]    ENTEROSCOPY SMALL BOWEL N/A 2/3/2022    Procedure: ENTEROSCOPY with  possible fistula closure;  Surgeon: Francisco Dodson MD;  Location: [facility]    ESOPHAGOSCOPY, GASTROSCOPY, DUODENOSCOPY (EGD), COMBINED N/A 6/24/2015    Procedure: COMBINED ESOPHAGOSCOPY, GASTROSCOPY, DUODENOSCOPY (EGD), REMOVE FOREIGN BODY;  Surgeon: Mandeep Park MD;  [facility]    ESOPHAGOSCOPY, GASTROSCOPY, DUODENOSCOPY (EGD), COMBINED N/A 10/25/2015    Procedure: COMBINED ESOPHAGOSCOPY, GASTROSCOPY, DUODENOSCOPY (EGD);  Surgeon: Sammy Amaro MD;  [facility]    ESOPHAGOSCOPY, GASTROSCOPY, DUODENOSCOPY (EGD), COMBINED N/A 10/25/2015    Procedure: COMBINED ESOPHAGOSCOPY, GASTROSCOPY, DUODENOSCOPY (EGD), BIOPSY SINGLE OR MULTIPLE;  Surgeon: Sammy Amaro MD;  [facility]    ESOPHAGOSCOPY, GASTROSCOPY, DUODENOSCOPY (EGD), COMBINED N/A 1/31/2023    Procedure: ESOPHAGOGASTRODUODENOSCOPY (EGD) with peg replacement ;  Surgeon: Mandeep Park MD;  [facility]    ESOPHAGOSCOPY, GASTROSCOPY, DUODENOSCOPY (EGD), DILATATION, COMBINED      EXCISE LESION TRUNK N/A 4/17/2017    Procedure: EXCISE LESION TRUNK;  Removal of Abdominal Foreign Body;  Surgeon: Nestor Phoenix MD;  [facility]    HC ESOPH/GAS REFLUX TEST W NASAL IMPED >1 HR N/A 11/19/2015    Procedure: ESOPHAGEAL IMPEDENCE FUNCTION TEST WITH 24 HOUR PH GREATER THAN 1 HOUR;  Surgeon: Thiago Apple MD;  [facility]    HC UGI ENDOSCOPY DIAG W BIOPSY  9/17/08    HC UGI ENDOSCOPY DIAG W BIOPSY  9/27/12    HC UGI ENDOSCOPY W ESOPHAGEAL DILATION BALLOON <30MM  9/17/08    HC UGI ENDOSCOPY W EUS N/A 5/5/2015    Procedure: COMBINED ENDOSCOPIC ULTRASOUND, ESOPHAGOSCOPY, GASTROSCOPY, DUODENOSCOPY (EGD);  Surgeon: Wm Dueñas MD;  [facility]    HC WRIST ARTHROSCOP,RELEASE XVERS LIG Bilateral 12/17/08    INJECT TRANSVERSUS ABDOMINIS PLANE (TAP) BLOCK BILATERAL Left 9/22/2016    Procedure: INJECT TRANSVERSUS ABDOMINIS PLANE (TAP) BLOCK BILATERAL;  Surgeon: Dickson Corrigan MD;  [facility]    INJECT  TRIGGER POINT Bilateral 9/8/2022    Procedure: Abdominal trigger point injection with ultrasound;  Surgeon: Monika Mahajan MD;  [facility]    INJECT TRIGGER POINT SINGLE / MULTIPLE 3 OR MORE MUSCLES Right 11/15/2022    Procedure: Trigger point injections right abdomen with ultrasound guidance;  Surgeon: Monika Mahajan MD;  Location: [facility]    IR CHEST PORT PLACEMENT < 5 YRS OF AGE  6/10/2022    laparoscopic pineda  1995    LAPAROSCOPIC HERNIORRHAPHY INCISIONAL N/A 8/23/2018    Procedure: LAPAROSCOPIC HERNIORRHAPHY INCISIONAL;  Laparoscopic Incisional Hernia Repair with Symbotex Mesh Implant;  Surgeon: Nestor Phoenix MD;  [facility]    LAPAROSCOPIC PANCREATECTOMY, TRANSPLANT AUTO ISLET CELL N/A 12/28/2016    Procedure: LAPAROSCOPIC PANCREATECTOMY, TRANSPLANT AUTO ISLET CELL;  Surgeon: Nestor Phoenix MD;  [facility]    LAPAROTOMY EXPLORATORY N/A 1/31/2023    Procedure: Exploratory Laparotomy, lysis of adhesions, Perforated J-Junostomy Resection, Replace J-Junostomy site;  Surgeon: Elver Bauer MD;  [facility]    LAPAROTOMY, LYSIS ADHESIONS, COMBINED N/A 1/31/2023    Procedure: Dilatation of jejunostomy feeding tube, track with placement of jejunostomy tube with fluoroscopy;  Surgeon: Elver Bauer MD;  [facility]    REMOVE AND REPLACE BREAST IMPLANT PROSTHESIS N/A 12/30/2022    Procedure: Exploratory Laparotomy, lysis of adhesions, small bowel resection. Placement of gastric jejunostomy for enteral feeding.;  Surgeon: Elver Bauer MD;  [facility]    REMOVE GASTROSTOMY TUBE ADULT N/A 1/28/2022    Procedure: REMOVAL, GASTROSTOMY TUBE, ADULT;  Surgeon: Mandeep Park MD;  [facility]    REPLACE GASTROJEJUNOSTOMY TUBE, PERCUTANEOUS N/A 9/7/2021    Procedure: GASTROJEJUNOSTOMY TUBE PLACEMENT, PERCUTANEOUS, WITH GASTROPEXY;  Surgeon: Mandeep Park MD;  [facility]    REPLACE GASTROJEJUNOSTOMY TUBE, PERCUTANEOUS N/A 9/22/2021     Procedure: REPLACEMENT, GASTROJEJUNOSTOMY TUBE, PERCUTANEOUS;  Surgeon: Zackery Montoya MD;  [facility]    REPLACE GASTROJEJUNOSTOMY TUBE, PERCUTANEOUS N/A 2022    Procedure: REPLACEMENT, GASTROJEJUNOSTOMY TUBE, PERCUTANEOUS;  Surgeon: Mandeep Park MD;  [facility]    REPLACE GASTROJEJUNOSTOMY TUBE, PERCUTANEOUS N/A 2022    Procedure: REPLACEMENT, GASTROJEJUNOSTOMY TUBE, PERCUTANEOUS with ENDOSCOPIC SUTURING.;  Surgeon: Mandeep Park MD;  [facility]    REPLACE JEJUNOSTOMY TUBE, PERCUTANEOUS N/A 9/10/2021    Procedure: UPPER ENDOSCOPY, REPLACEMENT OF PERCUTANEOUS GASTROJEJUNOSTOMY TUBE, T-TAG GASTROPEXY;  Surgeon: Zackery Montoya MD;  [facility]    REPLACE JEJUNOSTOMY TUBE, PERCUTANEOUS N/A 2021    Procedure: REPLACEMENT, JEJUNOSTOMY TUBE, PERCUTANEOUS;  Surgeon: Mandeep Park MD;  [facility]    REPLACE JEJUNOSTOMY TUBE, PERCUTANEOUS N/A 2023    Procedure: REPLACEMENT, JEJUNOSTOMY TUBE, PERCUTANEOUS;  Surgeon: Mandeep Park MD;  [facility]    transphenoidal pituitary resection      [facility]  DELIVERY ONLY      [facility]  DELIVERY ONLY      repeat c section with incidental cystotomy with repair    [facility] EXCIS PITUITARY,TRANSNASAL/SEPTAL      pituitary tumor removed for diabetes insipidus    [facility] TOTAL ABDOM HYSTERECTOMY      w/ bilateral salpingoophorectomy      acetaminophen (TYLENOL) 325 MG/10.15ML solution  amitriptyline (ELAVIL) 10 MG tablet  amylase-lipase-protease (CREON) 03728-23745 units CPEP per EC capsule  blood glucose (PAT CONTOUR NEXT) test strip  blood glucose monitoring (PAT MICROLET) lancets  cetirizine (ZYRTEC) 10 MG tablet  Continuous Blood Gluc Sensor (FREESTYLE LISA 3 SENSOR) MISC  cyclobenzaprine (FLEXERIL) 10 MG tablet  Digestive Enzyme Cartridge (RELIZORB) MARCO  diphenhydrAMINE (BENADRYL ALLERGY) 25 MG capsule  estradiol (VAGIFEM) 10 MCG TABS vaginal tablet  famotidine  "(PEPCID) 40 MG/5ML suspension  fluticasone (FLONASE) 50 MCG/ACT nasal spray  glucagon 1 MG kit  glucose 40 % GEL gel  ibuprofen (ADVIL/MOTRIN) 400 MG tablet  insulin syringe-needle U-100 (30G X 1/2\" 0.3 ML) 30G X 1/2\" 0.3 ML miscellaneous  levothyroxine (SYNTHROID/LEVOTHROID) 137 MCG tablet  lidocaine (LIDODERM) 5 % patch  methocarbamol (ROBAXIN) 750 MG tablet  montelukast (SINGULAIR) 10 MG tablet  Nutritional Supplements (BOOST HIGH PROTEIN) LIQD  pantoprazole (PROTONIX) 40 mg IV push injection  promethazine 25 mg  promethazine-phenylephrine (PROMETHAZINE VC) 6.25-5 MG/5ML syrup  prucalopride succinate (MOTEGRITY) 2 MG tablet  scopolamine (TRANSDERM) 1 MG/3DAYS 72 hr patch  sennosides (SENOKOT) 8.8 MG/5ML syrup  Sharps Container (BD SHARPS ) MISC  silver nitrate (ARZOL SILVER NIT APPLICATORS) 75-25 % miscellaneous  sodium chloride 0.9% SOLN BOLUS  STATIN NOT PRESCRIBED (INTENTIONAL)  thiamine (B-1) 100 MG tablet  UNABLE TO FIND  zinc oxide - white petrolatum (CRITIC-AID THICK MOIST BARRIER) 20-51% PSTE topical paste  ZOLMitriptan (ZOMIG-ZMT) 5 MG ODT      Allergies   Allergen Reactions    Apple Juice Anaphylaxis    Corticosteroids Other (See Comments)     All oral, IV and injectable steroids are contraindicated (unless in life threatening situations) in [facility] [employer] transplant recipients. They can cause irreversible loss of islet cell function. Please contact patient's transplant care coordinator ADI Gaffney RN at [phone]/pager [phone] and/or endocrinologist prior to administration.      Depakote [Divalproex Sodium] Other (See Comments)     Chest pain    Zithromax [Azithromycin Dihydrate] Anaphylaxis     Noted in 4/7/08 ER    Bromfenac      Other reaction(s): Headache    Codeine      Other reaction(s): Hallucinations    Darvocet [Propoxyphene N-Apap] Itching    Morphine Nausea and Vomiting and Rash    Nalbuphine Hcl Rash     RASH :nubaine    Zosyn [Piperacillin-Tazobactam In Dex] Rash     " Possible allergy, did have a diffuse rash that seemed drug related but could have also been related to soap in the hospital.     Bactrim [Sulfamethoxazole W-Trimethoprim] Other (See Comments) and Nausea and Vomiting     Severely low liver function.    Statins Other (See Comments)     Previous liver issues, risks vs benefits felt to not warrant statin.  Discussed Oct 2022 visit    Tape [Adhesive Tape] Blisters    Tramadol     Zofran [Ondansetron] Other (See Comments)     migraine    Flagyl [Metronidazole] Hives and Rash     Family History  Family History   Problem Relation Age of Onset    Lipids Mother     Hypertension Mother     Thyroid Disease Mother     Depression Mother     Angina Mother     GERD Mother     Skin Cancer Mother     Migraines Mother     Autoimmune Disease Mother     Hyperlipidemia Mother     Mental Illness Mother     Eye Disorder Father         cataract, detached retina    Myocardial Infarction Father 60    Lipids Father     Cerebrovascular Disease Father     Depression Father     Substance Abuse Father     Anesthesia Reaction Father         stroke right after surgery    Cataracts Father     Osteoarthritis Father     Ulcerative Colitis Father     Autoimmune Disease Father     Heart Disease Father     Hyperlipidemia Father     Mental Illness Father     Interpersonal Violence Sister     Coronary Artery Disease Sister         angioplasty    GERD Sister     Substance Abuse Sister     Depression Sister     Thyroid Disease Sister     Eye Disorder Maternal Grandmother         cataract    Thyroid Disease Maternal Grandmother     Diabetes Maternal Grandfather     Eye Disorder Paternal Grandmother         cataract    Diabetes Paternal Grandmother     Eye Disorder Paternal Grandfather         cataract    Diabetes Paternal Grandfather     Substance Abuse Paternal Grandfather     Eye Disorder Son         ptosis    Depression Son     Anxiety Disorder Son     Heart Disease Paternal Aunt     Diabetes Paternal Aunt   "   Diabetes Paternal Uncle     Heart Disease Paternal Uncle     Depression Nephew     Anxiety Disorder Nephew     Thyroid Disease Nephew     Diabetes Type 2  Cousin         paternal cousin    Autoimmune Disease Cousin     Autoimmune Disease Sister     Depression Sister     Mental Illness Sister     Substance Abuse Sister     Thyroid Disease Sister     Depression Son     Mental Illness Son     Thyroid Disease Nephew      Social History   Social History     Tobacco Use    Smoking status: Former     Packs/day: 0.50     Years: 6.00     Pack years: 3.00     Types: Cigarettes     Start date: 1985     Quit date: 1992     Years since quittin.6    Tobacco comments:     no 2nd hand   Substance Use Topics    Alcohol use: Not Currently     Alcohol/week: 3.0 - 6.0 standard drinks of alcohol     Types: 1 - 2 Glasses of wine, 1 - 2 Cans of beer, 1 - 2 Shots of liquor per week     Comment: none since [facility]    Drug use: No     Comment: medical canabis tincture and vape         A medically appropriate review of systems was performed with pertinent positives and negatives noted in the HPI, and all other systems negative.    Physical Exam   BP: 117/78  Pulse: 97  Temp: 98  F (36.7  C)  Resp: 18  Height: 172.7 cm (5' 8\")  Weight: 59 kg (130 lb)  SpO2: 98 %  Physical Exam  Constitutional:       General: She is not in acute distress.     Appearance: She is not ill-appearing, toxic-appearing or diaphoretic.   HENT:      Head: Normocephalic and atraumatic.   Pulmonary:      Effort: Pulmonary effort is normal.   Abdominal:      General: Abdomen is flat.      Palpations: Abdomen is soft.      Tenderness: There is no abdominal tenderness.      Comments: Had a G-tube in place.  Left-sided J-tube with a red catheter in place.  No acute tenderness to touch.   Neurological:      General: No focal deficit present.      Mental Status: She is oriented to person, place, and time.   Psychiatric:         Mood and Affect: Mood normal.         " Behavior: Behavior normal.           ED Course, Procedures, & Data      Procedures                      No results found for any visits on 08/01/23.  Medications - No data to display  Labs Ordered and Resulted from Time of ED Arrival to Time of ED Departure - No data to display  No orders to display          Critical care was not performed.     Medical Decision Making  The patient's presentation was of moderate complexity (a chronic illness mild to moderate exacerbation, progression, or side effect of treatment).    The patient's evaluation involved:  review of external note(s) from 3+ sources (prior GI clinic notes; prior admission notes)  ordering and/or review of 3+ test(s) in this encounter (cbc, bmp, INR ordered and reveiwed )  review of 3+ test result(s) ordered prior to this encounter (prior labs reviewed)  discussion of management or test interpretation with another health professional (case discussed initially with GI fellow)    The patient's management necessitated high risk (a decision regarding emergency major procedure (plan to go to the OR with Dr. Park with GI)).    Assessment & Plan    patient is a 58-year-old female who has a J-tube that she uses for feeds who presents to the ER due to J-tube falling out.  I spoke to the GI fellow will have the patient added to receive her procedure later today.  Patient scheduled to have the procedure done at 1:00 in the afternoon.  Patient will be waiting in the waiting room until the procedure can be done at that time.  We did obtain some basic labs that are stable.  Plan is to discharge the patient once her J-tube is placed.    This part of the medical record was transcribed by Yoselyn Membreno, Medical Scribe, from a dictation done by Starla Garibay MD.       I have reviewed the nursing notes. I have reviewed the findings, diagnosis, plan and need for follow up with the patient.    New Prescriptions    No medications on file       Final diagnoses:    Encounter for gastrojejunal (GJ) tube placement       Starla Garibay MD  [facility] EMERGENCY DEPARTMENT  8/1/2023     Starla Garibay MD  08/01/23 [id number]

## 2023-08-02 RX ORDER — FAMOTIDINE 40 MG/5ML
20 POWDER, FOR SUSPENSION ORAL DAILY
Qty: 50 ML | Refills: 4 | Status: SHIPPED | OUTPATIENT
Start: 2023-08-02 | End: 2023-11-22

## 2023-08-10 ENCOUNTER — TRANSFERRED RECORDS (OUTPATIENT)
Dept: HEALTH INFORMATION MANAGEMENT | Facility: CLINIC | Age: 58
End: 2023-08-10
Payer: COMMERCIAL

## 2023-08-10 ENCOUNTER — DOCUMENTATION ONLY (OUTPATIENT)
Dept: INTERNAL MEDICINE | Facility: CLINIC | Age: 58
End: 2023-08-10
Payer: COMMERCIAL

## 2023-08-10 NOTE — PROGRESS NOTES
Type of Form Received:     Form Received (Date) 8/9/23   Form Filled out Yes, 8/11/23   Placed in provider folder Yes

## 2023-08-11 ENCOUNTER — MEDICAL CORRESPONDENCE (OUTPATIENT)
Dept: HEALTH INFORMATION MANAGEMENT | Facility: CLINIC | Age: 58
End: 2023-08-11
Payer: COMMERCIAL

## 2023-08-18 ENCOUNTER — PATIENT OUTREACH (OUTPATIENT)
Dept: GASTROENTEROLOGY | Facility: CLINIC | Age: 58
End: 2023-08-18
Payer: COMMERCIAL

## 2023-08-18 NOTE — TELEPHONE ENCOUNTER
Patient not able to keep fluids down, venting- overwhelming waves of nausea, no vomiting except for green bile. Feels like when she drinks water it just sits in her esophagus. She's venting her Gtube continuously.     No Bm in 3 days, had diarrhea Tuesday. Doesn't think she's passing gas. Feels bloated. Pain under left rib like when she had a prior bowel obstruction. Some distension in this area, the size of a baseball/     Using IV hydration, compazine and phenergan, is still using tube feeds.     Hip joints, knee, elbows, shoulders are aching, like when went septic before. But no temp now, but is is taking tylenol that could be masking a fever.     She is  in [place] at a [facility].     Encouraged her to present to local ER to get assessed for bowel obstruction. She'll keep us posted her ER course.    Kenya Victor RN Care Coordinator

## 2023-08-24 RX ORDER — BUPRENORPHINE 7.5 UG/H
1 PATCH TRANSDERMAL
COMMUNITY
Start: 2023-08-24 | End: 2023-11-07 | Stop reason: ALTCHOICE

## 2023-09-01 ENCOUNTER — TRANSFERRED RECORDS (OUTPATIENT)
Dept: HEALTH INFORMATION MANAGEMENT | Facility: CLINIC | Age: 58
End: 2023-09-01
Payer: COMMERCIAL

## 2023-09-07 ENCOUNTER — OFFICE VISIT (OUTPATIENT)
Dept: TRANSPLANT | Facility: CLINIC | Age: 58
End: 2023-09-07
Attending: PEDIATRICS
Payer: COMMERCIAL

## 2023-09-07 VITALS
DIASTOLIC BLOOD PRESSURE: 77 MMHG | WEIGHT: 133.5 LBS | SYSTOLIC BLOOD PRESSURE: 128 MMHG | HEART RATE: 106 BPM | OXYGEN SATURATION: 96 % | BODY MASS INDEX: 20.3 KG/M2 | RESPIRATION RATE: 16 BRPM

## 2023-09-07 DIAGNOSIS — E13.9 POST-PANCREATECTOMY DIABETES (H): Primary | ICD-10-CM

## 2023-09-07 DIAGNOSIS — E89.1 POST-PANCREATECTOMY DIABETES (H): Primary | ICD-10-CM

## 2023-09-07 DIAGNOSIS — Z90.410 POST-PANCREATECTOMY DIABETES (H): Primary | ICD-10-CM

## 2023-09-07 PROCEDURE — 99215 OFFICE O/P EST HI 40 MIN: CPT | Performed by: PEDIATRICS

## 2023-09-07 PROCEDURE — 83036 HEMOGLOBIN GLYCOSYLATED A1C: CPT | Performed by: PEDIATRICS

## 2023-09-07 PROCEDURE — 99213 OFFICE O/P EST LOW 20 MIN: CPT | Performed by: PEDIATRICS

## 2023-09-07 RX ORDER — INSULIN GLARGINE-YFGN 100 [IU]/ML
3 INJECTION, SOLUTION SUBCUTANEOUS EVERY 24 HOURS
Qty: 15 ML | Refills: 3 | Status: SHIPPED | OUTPATIENT
Start: 2023-09-07 | End: 2024-03-20

## 2023-09-07 NOTE — PROGRESS NOTES
[facility]  Islet Autotransplant, Diabetes Follow Up    Problem List:  Patient Active Problem List   Diagnosis    Hypothyroidism    Need for prophylactic immunotherapy    Sprain and strain of other specified sites of hip and thigh    Chondromalacia of patella    Sensorineural hearing loss    Vertiginous syndrome and labyrinthine disorder    Lumbago    Allergic rhinitis due to other allergen    GERD (gastroesophageal reflux disease)    Other type of intractable migraine    History of ERCP    Abdominal pain    S/P ERCP    Post-pancreatectomy diabetes (H)    Pancreatic insufficiency    ACP (advance care planning)    Gastroparesis    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    Incisional hernia, without obstruction or gangrene    Adhesive capsulitis of shoulder, unspecified laterality    S/P hernia repair    Headache, chronic migraine without aura    Chronic pain syndrome    Iron deficiency anemia    Hypoglycemia    Adult failure to thrive    Abdominal pain, generalized    Gastrostomy tube obstruction (H)    Intra-abdominal abscess (H)    Postprocedural intraabdominal abscess    Acquired absence of spleen    Depressive disorder    Diabetes insipidus (H)    Other specified abnormal immunological findings in serum    Poisoning by drug    Sphincter of Oddi dysfunction    Type 1 diabetes mellitus without complication (H)    Myofascial pain    Feeding intolerance    Acute postoperative abdominal pain    Major depression, single episode    History of food intolerance    Malnutrition, unspecified type (H)    Nausea and vomiting, unspecified vomiting type    On total parenteral nutrition (TPN)    Fever, unspecified fever cause    Chronic pancreatitis, unspecified pancreatitis type (H)    Cholangitis       HPI:  Dinora is a 58 year old  female here for follow up oflaparoscopic assisted total pancreatectomy, islet cell autotransplant, splenectomy, gastrojejunostomy tube revision, choledochoduodenostomy,  duodenojejunostomy, Vera-Y reconstruction performed on 2016.  At the time of the procedure, the patient received:  Total Islet number: [id number].  Total Islet number/k.  Islet equivalents: [id number]  Islet equivalents/kilogram: 4091    Post-surgical course was complicated by two minor procedures for a retained foreign body near GJ site in 2017 and hernia repair 2018, and severe gastroparesis (refractory to J-feeds, domperidone).  She has been insulin independent since about 1 year after surgery.    - Gastroparesis is her major issue post TPIAT. Admitted multiple times. GJ was helpful for short time but did not tolerate well.  Has tried multiple other approaches and continues to worsen, nothing offering benefit.  Tried expanded access domperidone but not helpful  - Also has hypoglycemia (prior treatments SGLT2 inhibitor not helpful, acarbose is helpful when eating, also uses mini glucagon).     At today's visit,   --  She is on Jtube feeds that run overnight.  She is running higher than her prior numbers, as noted below.  This does not seem related to feeds.  She is having headaches and feels like that may be in part due to higher numbers.  Still right now is not on insulin.  -- Gastroparesis remains severe- vomiting every day even on good days, bad days are all day dry heaving.  She is amazingly resilient and functional despite this.  Surprisingly was denied social security as very clearly she is medically unable to work, now working with  a .  -- Working with [facility]-- very happy with them, going to trial a spinal cord stimulator  -- J tube fell out once and had to be replaced.   -- no recent hypoglycemia.     Diabetes history:  Current insulin regimen:  None    For hypoglycemia she currently uses CGM for early detection plus mini dosing of glucagon as needed.     Wearing Yandy    TIR is 80% which is OK but noted that at last visit she was 96% TIR.   Avg BG is a bit high in 150s.              Recent hemoglobin A1c levels:    Today is 6.3% which is higher than her usual.    Hypoglycemia history: mild lows but rapid falls- not occurring recently. The patient has had 0 episodes of severe hypoglycemia (seizure, coma, or neuroglycopenic symptoms severe enough to require assistance from another person).  Blood sugars were reviewed from the patient records and/or the meter download.          Review of systems:  A complete ROS is negative except as noted in HPI above.      Past Medical and Surgical History:  Past Medical History:   Diagnosis Date    Allergic rhinitis, cause unspecified     Allergy to other foods     strawberries, apples, celeries, alice, watermelon    Arthritis     left knee    Choledocholithiasis     long after cholecystectomy    Chronic abdominal pain     Chronic constipation     Chronic nausea     Chronic pancreatitis (H)     Degeneration of lumbar or lumbosacral intervertebral disc     Esophageal reflux     w/ hiatal hernia    Gastroparesis     Hiatal hernia     History of pituitary adenoma     s/p resection    Hypothyroidism     Migraines     Mild hyperlipidemia     On tube feeding diet     presence of GJ tube    Pancreatic disease     PD stricture, 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 endometriosis growth    APPENDECTOMY       SECTION      COLONOSCOPY      COLONOSCOPY N/A 2023    Procedure: COLONOSCOPY, WITH POLYPECTOMY AND BIOPSY;  Surgeon: Percy Chamorro MD;  Location:  GI    ENDOSCOPIC INSERTION TUBE GASTROSTOMY N/A 2021    Procedure: Gastrojejonostomy placement with jejunopexy, PEG tube exchange;  Surgeon: Zackery Montoya MD;   [facility]    ENDOSCOPIC RETROGRADE CHOLANGIOPANCREATOGRAM N/A 6/2/2015    Procedure: ENDOSCOPIC RETROGRADE CHOLANGIOPANCREATOGRAM;  Surgeon: Mandeep Park MD;  [facility]    ENDOSCOPIC RETROGRADE CHOLANGIOPANCREATOGRAM N/A 2/9/2016    Procedure: COMBINED ENDOSCOPIC RETROGRADE CHOLANGIOPANCREATOGRAPHY, PLACE TUBE/STENT;  Surgeon: Mandeep Park MD;  [facility]    ENDOSCOPIC RETROGRADE CHOLANGIOPANCREATOGRAM N/A 3/17/2016    Procedure: COMBINED ENDOSCOPIC RETROGRADE CHOLANGIOPANCREATOGRAPHY, REMOVE FOREIGN BODY OR STENT/TUBE;  Surgeon: Mandeep Park MD;  [facility]    ENDOSCOPIC RETROGRADE CHOLANGIOPANCREATOGRAM N/A 8/2/2016    Procedure: COMBINED ENDOSCOPIC RETROGRADE CHOLANGIOPANCREATOGRAPHY, PLACE TUBE/STENT;  Surgeon: Mandeep Park MD;  [facility]    ENDOSCOPIC RETROGRADE CHOLANGIOPANCREATOGRAM N/A 8/26/2016    Procedure: COMBINED ENDOSCOPIC RETROGRADE CHOLANGIOPANCREATOGRAPHY, REMOVE FOREIGN BODY OR STENT/TUBE;  Surgeon: Mandeep Park MD;  [facility]    ENDOSCOPIC ULTRASOUND UPPER GASTROINTESTINAL TRACT (GI) N/A 10/3/2016    Procedure: ENDOSCOPIC ULTRASOUND, ESOPHAGOSCOPY / UPPER GASTROINTESTINAL TRACT (GI);  Surgeon: Guru Jose Rodas MD;  [facility]    ENTEROSCOPY SMALL BOWEL N/A 2/3/2022    Procedure: ENTEROSCOPY with possible fistula closure;  Surgeon: Francisco Dodson MD;  Location:  GI    ESOPHAGOSCOPY, GASTROSCOPY, DUODENOSCOPY (EGD), COMBINED N/A 6/24/2015    Procedure: COMBINED ESOPHAGOSCOPY, GASTROSCOPY, DUODENOSCOPY (EGD), REMOVE FOREIGN BODY;  Surgeon: Mandeep Park MD;  Location:  GI    ESOPHAGOSCOPY, GASTROSCOPY, DUODENOSCOPY (EGD), COMBINED N/A 10/25/2015    Procedure: COMBINED ESOPHAGOSCOPY, GASTROSCOPY, DUODENOSCOPY (EGD);  Surgeon: Sammy Amaro MD;  Location:  GI    ESOPHAGOSCOPY, GASTROSCOPY, DUODENOSCOPY (EGD), COMBINED N/A 10/25/2015    Procedure: COMBINED ESOPHAGOSCOPY,  GASTROSCOPY, DUODENOSCOPY (EGD), BIOPSY SINGLE OR MULTIPLE;  Surgeon: Sammy Amaro MD;  [facility]    ESOPHAGOSCOPY, GASTROSCOPY, DUODENOSCOPY (EGD), COMBINED N/A 1/31/2023    Procedure: ESOPHAGOGASTRODUODENOSCOPY (EGD) with peg replacement ;  Surgeon: Mandeep Park MD;  [facility]    ESOPHAGOSCOPY, GASTROSCOPY, DUODENOSCOPY (EGD), DILATATION, COMBINED      EXCISE LESION TRUNK N/A 4/17/2017    Procedure: EXCISE LESION TRUNK;  Removal of Abdominal Foreign Body;  Surgeon: Nestor Phoenix MD;  [facility]    HC ESOPH/GAS REFLUX TEST W NASAL IMPED >1 HR N/A 11/19/2015    Procedure: ESOPHAGEAL IMPEDENCE FUNCTION TEST WITH 24 HOUR PH GREATER THAN 1 HOUR;  Surgeon: Thiago Apple MD;  [facility]    HC UGI ENDOSCOPY DIAG W BIOPSY  9/17/08    HC UGI ENDOSCOPY DIAG W BIOPSY  9/27/12    HC UGI ENDOSCOPY W ESOPHAGEAL DILATION BALLOON <30MM  9/17/08    HC UGI ENDOSCOPY W EUS N/A 5/5/2015    Procedure: COMBINED ENDOSCOPIC ULTRASOUND, ESOPHAGOSCOPY, GASTROSCOPY, DUODENOSCOPY (EGD);  Surgeon: Wm Dueñas MD;  [facility]    HC WRIST ARTHROSCOP,RELEASE XVERS LIG Bilateral 12/17/08    INJECT TRANSVERSUS ABDOMINIS PLANE (TAP) BLOCK BILATERAL Left 9/22/2016    Procedure: INJECT TRANSVERSUS ABDOMINIS PLANE (TAP) BLOCK BILATERAL;  Surgeon: Dickson Corrigan MD;  [facility]    INJECT TRIGGER POINT Bilateral 9/8/2022    Procedure: Abdominal trigger point injection with ultrasound;  Surgeon: Monika Mahajan MD;  [facility]    INJECT TRIGGER POINT SINGLE / MULTIPLE 3 OR MORE MUSCLES Right 11/15/2022    Procedure: Trigger point injections right abdomen with ultrasound guidance;  Surgeon: Monika Mahajan MD;  Location: [facility]    IR CHEST PORT PLACEMENT < 5 YRS OF AGE  6/10/2022    laparoscopic pineda  1995    LAPAROSCOPIC HERNIORRHAPHY INCISIONAL N/A 8/23/2018    Procedure: LAPAROSCOPIC HERNIORRHAPHY INCISIONAL;  Laparoscopic Incisional Hernia Repair with Symbotex Mesh Implant;   Surgeon: Nestor Phoenix MD;  [facility]    LAPAROSCOPIC PANCREATECTOMY, TRANSPLANT AUTO ISLET CELL N/A 12/28/2016    Procedure: LAPAROSCOPIC PANCREATECTOMY, TRANSPLANT AUTO ISLET CELL;  Surgeon: Nestor Phoenix MD;  [facility]    LAPAROTOMY EXPLORATORY N/A 1/31/2023    Procedure: Exploratory Laparotomy, lysis of adhesions, Perforated J-Junostomy Resection, Replace J-Junostomy site;  Surgeon: Elver Bauer MD;  [facility]    LAPAROTOMY, LYSIS ADHESIONS, COMBINED N/A 1/31/2023    Procedure: Dilatation of jejunostomy feeding tube, track with placement of jejunostomy tube with fluoroscopy;  Surgeon: Elver Bauer MD;  [facility]    REMOVE AND REPLACE BREAST IMPLANT PROSTHESIS N/A 12/30/2022    Procedure: Exploratory Laparotomy, lysis of adhesions, small bowel resection. Placement of gastric jejunostomy for enteral feeding.;  Surgeon: Elver Bauer MD;  [facility]    REMOVE GASTROSTOMY TUBE ADULT N/A 1/28/2022    Procedure: REMOVAL, GASTROSTOMY TUBE, ADULT;  Surgeon: Mandeep Park MD;  [facility]    REPLACE GASTROJEJUNOSTOMY TUBE, PERCUTANEOUS N/A 9/7/2021    Procedure: GASTROJEJUNOSTOMY TUBE PLACEMENT, PERCUTANEOUS, WITH GASTROPEXY;  Surgeon: Mandeep Park MD;  [facility]    REPLACE GASTROJEJUNOSTOMY TUBE, PERCUTANEOUS N/A 9/22/2021    Procedure: REPLACEMENT, GASTROJEJUNOSTOMY TUBE, PERCUTANEOUS;  Surgeon: Zackery Montoya MD;  [facility]    REPLACE GASTROJEJUNOSTOMY TUBE, PERCUTANEOUS N/A 11/22/2022    Procedure: REPLACEMENT, GASTROJEJUNOSTOMY TUBE, PERCUTANEOUS;  Surgeon: Mandeep Park MD;  [facility]    REPLACE GASTROJEJUNOSTOMY TUBE, PERCUTANEOUS N/A 12/9/2022    Procedure: REPLACEMENT, GASTROJEJUNOSTOMY TUBE, PERCUTANEOUS with ENDOSCOPIC SUTURING.;  Surgeon: Mandeep Park MD;  [facility]    REPLACE GASTROJEJUNOSTOMY TUBE, PERCUTANEOUS N/A 8/1/2023    Procedure: Replace Gastrojejunostomy  Tube, Percutaneous;  Surgeon: Mandeep Park MD;  [facility]    REPLACE JEJUNOSTOMY TUBE, PERCUTANEOUS N/A 9/10/2021    Procedure: UPPER ENDOSCOPY, REPLACEMENT OF PERCUTANEOUS GASTROJEJUNOSTOMY TUBE, T-TAG GASTROPEXY;  Surgeon: Zackery Montoya MD;  [facility]    REPLACE JEJUNOSTOMY TUBE, PERCUTANEOUS N/A 2021    Procedure: REPLACEMENT, JEJUNOSTOMY TUBE, PERCUTANEOUS;  Surgeon: Mandeep Park MD;  [facility]    REPLACE JEJUNOSTOMY TUBE, PERCUTANEOUS N/A 2023    Procedure: REPLACEMENT, JEJUNOSTOMY TUBE, PERCUTANEOUS;  Surgeon: Mandeep Park MD;  [facility]    REPLACE JEJUNOSTOMY TUBE, PERCUTANEOUS  2023    Procedure: Replace Jejunostomy Tube, Percutaneous;  Surgeon: Mandeep Park MD;  [facility]    transphenoidal pituitary resection      Z  DELIVERY ONLY      [facility]  DELIVERY ONLY      repeat c section with incidental cystotomy with repair    [facility] EXCIS PITUITARY,TRANSNASAL/SEPTAL      pituitary tumor removed for diabetes insipidus    Z TOTAL ABDOM HYSTERECTOMY      w/ bilateral salpingoophorectomy        Family History:  New changes since last visit:  none  Family History   Problem Relation Age of Onset    Lipids Mother     Hypertension Mother     Thyroid Disease Mother     Depression Mother     Angina Mother     GERD Mother     Skin Cancer Mother     Migraines Mother     Autoimmune Disease Mother     Hyperlipidemia Mother     Mental Illness Mother     Eye Disorder Father         cataract, detached retina    Myocardial Infarction Father 60    Lipids Father     Cerebrovascular Disease Father     Depression Father     Substance Abuse Father     Anesthesia Reaction Father         stroke right after surgery    Cataracts Father     Osteoarthritis Father     Ulcerative Colitis Father     Autoimmune Disease Father     Heart Disease Father     Hyperlipidemia Father     Mental Illness Father     Interpersonal  Violence Sister     Coronary Artery Disease Sister         angioplasty    GERD Sister     Substance Abuse Sister     Depression Sister     Thyroid Disease Sister     Eye Disorder Maternal Grandmother         cataract    Thyroid Disease Maternal Grandmother     Diabetes Maternal Grandfather     Eye Disorder Paternal Grandmother         cataract    Diabetes Paternal Grandmother     Eye Disorder Paternal Grandfather         cataract    Diabetes Paternal Grandfather     Substance Abuse Paternal Grandfather     Eye Disorder Son         ptosis    Depression Son     Anxiety Disorder Son     Heart Disease Paternal Aunt     Diabetes Paternal Aunt     Diabetes Paternal Uncle     Heart Disease Paternal Uncle     Depression Nephew     Anxiety Disorder Nephew     Thyroid Disease Nephew     Diabetes Type 2  Cousin         paternal cousin    Autoimmune Disease Cousin     Autoimmune Disease Sister     Depression Sister     Mental Illness Sister     Substance Abuse Sister     Thyroid Disease Sister     Depression Son     Mental Illness Son     Thyroid Disease Nephew        Social History:  Social History     Social History Narrative     with 3 children and a dog.  No smoking, etoh or drug use.  Worked as a  for [employer] in [place] in the past.  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     Physical Exam:  Vitals: /77 (BP Location: Right arm, Patient Position: Sitting, Cuff Size: Adult Regular)   Pulse 106   Resp 16   Wt 60.6 kg (133 lb 8 oz)   SpO2 96%   BMI 20.30 kg/m    BMI= Body mass index is 20.3 kg/m .  General:  Appearance is normal, no acute distress  HEENT:  NC/AT, sclera appear white  Neck:  No obvious thyromegaly  CV/Lungs:  Non distressed breathing  Skin:  No apparent rashes  Neuro:  Normal mental status  Psych:  Normal affect          Assessment:  1.  Post  pancreatectomy diabetes mellitus, s/p total pancreatectomy and islet autotransplant.  (ICD-10 E89.1)  2.  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   Gastroparesis  4.  Nausea, vomiting      Dinora is a 58 year old with history of chronic pancreatitis who is s/p total pancreatectomy and islet autotransplant.  She has been insulin independent with A1c in goal range.    Her major glycemic issue has been hypoglycemia historically but today I am concerned about mild hyperglycemia.  Because of her severe gastroparesis, I am concerned that even mild hyperglycemia may exacerbate this and we both agreed in discussion today that it would be reasonable at this point to start a small dose of basal insulin.  Reviewed plan below and discussed/reviewed insulin administration.     Plan:  1.  Changes to current diabetes regimen:  Patient Instructions   1)  Start glargine (Semglee, Basaglar, Lantus) at 3 units per day- at the same time each day (probably morning based on your pattern).    2)  Here is a resource for injection sites.   [email or web address]  Make sure to do a 2 unit air prime first, and then inject and count to 10 before pulling the needle back out.     2.  Frequency of blood sugar checks:  Keep wearing Yandy-- this is much better for Dinora than fingersticks because with fingersticks we miss the really critical data of the post prandial excursions.    3.  Continue routine follow up for autoislet transplant patients:  Mixed meal test (6 mL/kg BoostHP to max of 360 mL) at 3 months, 6 months, and once a year post transplant.  Hemoglobin A1c levels at these time points and quarterly.    4.  Other issues addressed today:  none    Follow up:  4 mos    Contact me for questions at [phone] or [phone].  Emergency number to reach pediatric  endocrinology after hours is [phone].      Dr. Gloria Melissa MD  Professor, Pediatric Endocrinology  [facility]  Phone:  [phone]  Electronically signed: September 8, 2023 at 10:31 AM        Review of the result(s) of each unique test - HbA1c, jim  40 minutes spent on the date of the encounter doing chart review, history and exam, documentation, downloading and reviewing CGM data,  and further activities per the note

## 2023-09-07 NOTE — NURSING NOTE
Chief Complaint   Patient presents with    Follow Up     Islet transplant 12/28/2016     /77 (BP Location: Right arm, Patient Position: Sitting, Cuff Size: Adult Regular)   Pulse 106   Resp 16   Wt 60.6 kg (133 lb 8 oz)   SpO2 96%   BMI 20.30 kg/m      CARYN HOFF, RN on 9/7/2023 at 4:11 PM

## 2023-09-07 NOTE — PATIENT INSTRUCTIONS
1)  Start glargine (Semglee, Basaglar, Lantus) at 3 units per day- at the same time each day (probably morning based on your pattern).    2)  Here is a resource for injection sites.   [email or web address]  Make sure to do a 2 unit air prime first, and then inject and count to 10 before pulling the needle back out.

## 2023-09-07 NOTE — LETTER
9/7/2023         RE: Dinora Mcghee  [address] [facility] [address]        Dear Colleague,    Thank you for referring your patient, Dinora Mcghee, to the [facility]. Please see a copy of my visit note below.    [facility]  Islet Autotransplant, Diabetes Follow Up    Problem List:  Patient Active Problem List   Diagnosis    Hypothyroidism    Need for prophylactic immunotherapy    Sprain and strain of other specified sites of hip and thigh    Chondromalacia of patella    Sensorineural hearing loss    Vertiginous syndrome and labyrinthine disorder    Lumbago    Allergic rhinitis due to other allergen    GERD (gastroesophageal reflux disease)    Other type of intractable migraine    History of ERCP    Abdominal pain    S/P ERCP    Post-pancreatectomy diabetes (H)    Pancreatic insufficiency    ACP (advance care planning)    Gastroparesis    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    Incisional hernia, without obstruction or gangrene    Adhesive capsulitis of shoulder, unspecified laterality    S/P hernia repair    Headache, chronic migraine without aura    Chronic pain syndrome    Iron deficiency anemia    Hypoglycemia    Adult failure to thrive    Abdominal pain, generalized    Gastrostomy tube obstruction (H)    Intra-abdominal abscess (H)    Postprocedural intraabdominal abscess    Acquired absence of spleen    Depressive disorder    Diabetes insipidus (H)    Other specified abnormal immunological findings in serum    Poisoning by drug    Sphincter of Oddi dysfunction    Type 1 diabetes mellitus without complication (H)    Myofascial pain    Feeding intolerance    Acute postoperative abdominal pain    Major depression, single episode    History of food intolerance    Malnutrition, unspecified type (H)    Nausea and vomiting, unspecified vomiting type    On total parenteral nutrition (TPN)    Fever, unspecified fever cause    Chronic pancreatitis,  unspecified pancreatitis type (H)    Cholangitis       HPI:  Dinora is a 58 year old  female here for follow up oflaparoscopic assisted total pancreatectomy, islet cell autotransplant, splenectomy, gastrojejunostomy tube revision, choledochoduodenostomy, duodenojejunostomy, Vera-Y reconstruction performed on 2016.  At the time of the procedure, the patient received:  Total Islet number: [id number].  Total Islet number/k.  Islet equivalents: [id number]  Islet equivalents/kilogram: 4091    Post-surgical course was complicated by two minor procedures for a retained foreign body near GJ site in 2017 and hernia repair 2018, and severe gastroparesis (refractory to J-feeds, domperidone).  She has been insulin independent since about 1 year after surgery.    - Gastroparesis is her major issue post TPIAT. Admitted multiple times. GJ was helpful for short time but did not tolerate well.  Has tried multiple other approaches and continues to worsen, nothing offering benefit.  Tried expanded access domperidone but not helpful  - Also has hypoglycemia (prior treatments SGLT2 inhibitor not helpful, acarbose is helpful when eating, also uses mini glucagon).     At today's visit,   --  She is on Jtube feeds that run overnight.  She is running higher than her prior numbers, as noted below.  This does not seem related to feeds.  She is having headaches and feels like that may be in part due to higher numbers.  Still right now is not on insulin.  -- Gastroparesis remains severe- vomiting every day even on good days, bad days are all day dry heaving.  She is amazingly resilient and functional despite this.  Surprisingly was denied social security as very clearly she is medically unable to work, now working with  a .  -- Working with [place] [facility]-- very happy with them, going to trial a spinal cord stimulator  -- J tube fell out once and had to be replaced.   -- no recent hypoglycemia.     Diabetes  history:  Current insulin regimen:  None    For hypoglycemia she currently uses CGM for early detection plus mini dosing of glucagon as needed.     Wearing Yandy    TIR is 80% which is OK but noted that at last visit she was 96% TIR.   Avg BG is a bit high in 150s.             Recent hemoglobin A1c levels:    Today is 6.3% which is higher than her usual.    Hypoglycemia history: mild lows but rapid falls- not occurring recently. The patient has had 0 episodes of severe hypoglycemia (seizure, coma, or neuroglycopenic symptoms severe enough to require assistance from another person).  Blood sugars were reviewed from the patient records and/or the meter download.          Review of systems:  A complete ROS is negative except as noted in HPI above.      Past Medical and Surgical History:  Past Medical History:   Diagnosis Date    Allergic rhinitis, cause unspecified     Allergy to other foods     strawberries, apples, celeries, alice, watermelon    Arthritis     left knee    Choledocholithiasis     long after cholecystectomy    Chronic abdominal pain     Chronic constipation     Chronic nausea     Chronic pancreatitis (H)     Degeneration of lumbar or lumbosacral intervertebral disc     Esophageal reflux     w/ hiatal hernia    Gastroparesis     Hiatal hernia     History of pituitary adenoma     s/p resection    Hypothyroidism     Migraines     Mild hyperlipidemia     On tube feeding diet     presence of GJ tube    Pancreatic disease     PD stricture, 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 endometriosis growth    APPENDECTOMY       SECTION      COLONOSCOPY      COLONOSCOPY N/A 2023    Procedure:  COLONOSCOPY, WITH POLYPECTOMY AND BIOPSY;  Surgeon: Percy Chamorro MD;  [facility]    ENDOSCOPIC INSERTION TUBE GASTROSTOMY N/A 11/28/2021    Procedure: Gastrojejonostomy placement with jejunopexy, PEG tube exchange;  Surgeon: Zackery Montoya MD;  [facility]    ENDOSCOPIC RETROGRADE CHOLANGIOPANCREATOGRAM N/A 6/2/2015    Procedure: ENDOSCOPIC RETROGRADE CHOLANGIOPANCREATOGRAM;  Surgeon: Mandeep Park MD;  [facility]    ENDOSCOPIC RETROGRADE CHOLANGIOPANCREATOGRAM N/A 2/9/2016    Procedure: COMBINED ENDOSCOPIC RETROGRADE CHOLANGIOPANCREATOGRAPHY, PLACE TUBE/STENT;  Surgeon: Mandeep Park MD;  [facility]    ENDOSCOPIC RETROGRADE CHOLANGIOPANCREATOGRAM N/A 3/17/2016    Procedure: COMBINED ENDOSCOPIC RETROGRADE CHOLANGIOPANCREATOGRAPHY, REMOVE FOREIGN BODY OR STENT/TUBE;  Surgeon: Mandeep Park MD;  [facility]    ENDOSCOPIC RETROGRADE CHOLANGIOPANCREATOGRAM N/A 8/2/2016    Procedure: COMBINED ENDOSCOPIC RETROGRADE CHOLANGIOPANCREATOGRAPHY, PLACE TUBE/STENT;  Surgeon: Mandeep Park MD;  [facility]    ENDOSCOPIC RETROGRADE CHOLANGIOPANCREATOGRAM N/A 8/26/2016    Procedure: COMBINED ENDOSCOPIC RETROGRADE CHOLANGIOPANCREATOGRAPHY, REMOVE FOREIGN BODY OR STENT/TUBE;  Surgeon: Mandeep Park MD;  [facility]    ENDOSCOPIC ULTRASOUND UPPER GASTROINTESTINAL TRACT (GI) N/A 10/3/2016    Procedure: ENDOSCOPIC ULTRASOUND, ESOPHAGOSCOPY / UPPER GASTROINTESTINAL TRACT (GI);  Surgeon: Guru Jose Rodas MD;  [facility]    ENTEROSCOPY SMALL BOWEL N/A 2/3/2022    Procedure: ENTEROSCOPY with possible fistula closure;  Surgeon: Francisco Dodson MD;  Location:  GI    ESOPHAGOSCOPY, GASTROSCOPY, DUODENOSCOPY (EGD), COMBINED N/A 6/24/2015    Procedure: COMBINED ESOPHAGOSCOPY, GASTROSCOPY, DUODENOSCOPY (EGD), REMOVE FOREIGN BODY;  Surgeon: Mandeep Park MD;  [facility]    ESOPHAGOSCOPY, GASTROSCOPY, DUODENOSCOPY (EGD),  COMBINED N/A 10/25/2015    Procedure: COMBINED ESOPHAGOSCOPY, GASTROSCOPY, DUODENOSCOPY (EGD);  Surgeon: Sammy Amaro MD;  [facility]    ESOPHAGOSCOPY, GASTROSCOPY, DUODENOSCOPY (EGD), COMBINED N/A 10/25/2015    Procedure: COMBINED ESOPHAGOSCOPY, GASTROSCOPY, DUODENOSCOPY (EGD), BIOPSY SINGLE OR MULTIPLE;  Surgeon: Sammy Amaro MD;  [facility]    ESOPHAGOSCOPY, GASTROSCOPY, DUODENOSCOPY (EGD), COMBINED N/A 1/31/2023    Procedure: ESOPHAGOGASTRODUODENOSCOPY (EGD) with peg replacement ;  Surgeon: Mandeep Park MD;  [facility]    ESOPHAGOSCOPY, GASTROSCOPY, DUODENOSCOPY (EGD), DILATATION, COMBINED      EXCISE LESION TRUNK N/A 4/17/2017    Procedure: EXCISE LESION TRUNK;  Removal of Abdominal Foreign Body;  Surgeon: Nestor Phoenix MD;  [facility]    HC ESOPH/GAS REFLUX TEST W NASAL IMPED >1 HR N/A 11/19/2015    Procedure: ESOPHAGEAL IMPEDENCE FUNCTION TEST WITH 24 HOUR PH GREATER THAN 1 HOUR;  Surgeon: Thiago Apple MD;  [facility]    HC UGI ENDOSCOPY DIAG W BIOPSY  9/17/08    HC UGI ENDOSCOPY DIAG W BIOPSY  9/27/12    HC UGI ENDOSCOPY W ESOPHAGEAL DILATION BALLOON <30MM  9/17/08    HC UGI ENDOSCOPY W EUS N/A 5/5/2015    Procedure: COMBINED ENDOSCOPIC ULTRASOUND, ESOPHAGOSCOPY, GASTROSCOPY, DUODENOSCOPY (EGD);  Surgeon: Wm Dueñas MD;  [facility]    HC WRIST ARTHROSCOP,RELEASE XVERS LIG Bilateral 12/17/08    INJECT TRANSVERSUS ABDOMINIS PLANE (TAP) BLOCK BILATERAL Left 9/22/2016    Procedure: INJECT TRANSVERSUS ABDOMINIS PLANE (TAP) BLOCK BILATERAL;  Surgeon: Dickson Corrigan MD;  [facility]    INJECT TRIGGER POINT Bilateral 9/8/2022    Procedure: Abdominal trigger point injection with ultrasound;  Surgeon: Monika Mahajan MD;  [facility]    INJECT TRIGGER POINT SINGLE / MULTIPLE 3 OR MORE MUSCLES Right 11/15/2022    Procedure: Trigger point injections right abdomen with ultrasound guidance;  Surgeon: Monika Mahajan MD;  Location:  [facility]    IR CHEST PORT PLACEMENT < 5 YRS OF AGE  6/10/2022    laparoscopic pineda  1995    LAPAROSCOPIC HERNIORRHAPHY INCISIONAL N/A 8/23/2018    Procedure: LAPAROSCOPIC HERNIORRHAPHY INCISIONAL;  Laparoscopic Incisional Hernia Repair with Symbotex Mesh Implant;  Surgeon: Nestor Phoenix MD;  [facility]    LAPAROSCOPIC PANCREATECTOMY, TRANSPLANT AUTO ISLET CELL N/A 12/28/2016    Procedure: LAPAROSCOPIC PANCREATECTOMY, TRANSPLANT AUTO ISLET CELL;  Surgeon: Nestor Phoenix MD;  [facility]    LAPAROTOMY EXPLORATORY N/A 1/31/2023    Procedure: Exploratory Laparotomy, lysis of adhesions, Perforated J-Junostomy Resection, Replace J-Junostomy site;  Surgeon: Elver Bauer MD;  [facility]    LAPAROTOMY, LYSIS ADHESIONS, COMBINED N/A 1/31/2023    Procedure: Dilatation of jejunostomy feeding tube, track with placement of jejunostomy tube with fluoroscopy;  Surgeon: Elver aBuer MD;  [facility]    REMOVE AND REPLACE BREAST IMPLANT PROSTHESIS N/A 12/30/2022    Procedure: Exploratory Laparotomy, lysis of adhesions, small bowel resection. Placement of gastric jejunostomy for enteral feeding.;  Surgeon: Elver Bauer MD;  [facility]    REMOVE GASTROSTOMY TUBE ADULT N/A 1/28/2022    Procedure: REMOVAL, GASTROSTOMY TUBE, ADULT;  Surgeon: Mandeep Park MD;  [facility]    REPLACE GASTROJEJUNOSTOMY TUBE, PERCUTANEOUS N/A 9/7/2021    Procedure: GASTROJEJUNOSTOMY TUBE PLACEMENT, PERCUTANEOUS, WITH GASTROPEXY;  Surgeon: Mandeep Park MD;  [facility]    REPLACE GASTROJEJUNOSTOMY TUBE, PERCUTANEOUS N/A 9/22/2021    Procedure: REPLACEMENT, GASTROJEJUNOSTOMY TUBE, PERCUTANEOUS;  Surgeon: Zackery Montoya MD;  [facility]    REPLACE GASTROJEJUNOSTOMY TUBE, PERCUTANEOUS N/A 11/22/2022    Procedure: REPLACEMENT, GASTROJEJUNOSTOMY TUBE, PERCUTANEOUS;  Surgeon: Mandeep Park MD;  [facility]    REPLACE GASTROJEJUNOSTOMY TUBE, PERCUTANEOUS  N/A 2022    Procedure: REPLACEMENT, GASTROJEJUNOSTOMY TUBE, PERCUTANEOUS with ENDOSCOPIC SUTURING.;  Surgeon: Mandeep Park MD;  [facility]    REPLACE GASTROJEJUNOSTOMY TUBE, PERCUTANEOUS N/A 2023    Procedure: Replace Gastrojejunostomy Tube, Percutaneous;  Surgeon: Mandeep Park MD;  [facility]    REPLACE JEJUNOSTOMY TUBE, PERCUTANEOUS N/A 9/10/2021    Procedure: UPPER ENDOSCOPY, REPLACEMENT OF PERCUTANEOUS GASTROJEJUNOSTOMY TUBE, T-TAG GASTROPEXY;  Surgeon: Zackery Montoya MD;  [facility]    REPLACE JEJUNOSTOMY TUBE, PERCUTANEOUS N/A 2021    Procedure: REPLACEMENT, JEJUNOSTOMY TUBE, PERCUTANEOUS;  Surgeon: Mandeep Park MD;  [facility]    REPLACE JEJUNOSTOMY TUBE, PERCUTANEOUS N/A 2023    Procedure: REPLACEMENT, JEJUNOSTOMY TUBE, PERCUTANEOUS;  Surgeon: Mandeep Park MD;  [facility]    REPLACE JEJUNOSTOMY TUBE, PERCUTANEOUS  2023    Procedure: Replace Jejunostomy Tube, Percutaneous;  Surgeon: Mandeep Park MD;  [facility]    transphenoidal pituitary resection      Z  DELIVERY ONLY      Z  DELIVERY ONLY      repeat c section with incidental cystotomy with repair    [facility] EXCIS PITUITARY,TRANSNASAL/SEPTAL      pituitary tumor removed for diabetes insipidus    Z TOTAL ABDOM HYSTERECTOMY      w/ bilateral salpingoophorectomy        Family History:  New changes since last visit:  none  Family History   Problem Relation Age of Onset    Lipids Mother     Hypertension Mother     Thyroid Disease Mother     Depression Mother     Angina Mother     GERD Mother     Skin Cancer Mother     Migraines Mother     Autoimmune Disease Mother     Hyperlipidemia Mother     Mental Illness Mother     Eye Disorder Father         cataract, detached retina    Myocardial Infarction Father 60    Lipids Father     Cerebrovascular Disease Father     Depression Father     Substance Abuse Father      Anesthesia Reaction Father         stroke right after surgery    Cataracts Father     Osteoarthritis Father     Ulcerative Colitis Father     Autoimmune Disease Father     Heart Disease Father     Hyperlipidemia Father     Mental Illness Father     Interpersonal Violence Sister     Coronary Artery Disease Sister         angioplasty    GERD Sister     Substance Abuse Sister     Depression Sister     Thyroid Disease Sister     Eye Disorder Maternal Grandmother         cataract    Thyroid Disease Maternal Grandmother     Diabetes Maternal Grandfather     Eye Disorder Paternal Grandmother         cataract    Diabetes Paternal Grandmother     Eye Disorder Paternal Grandfather         cataract    Diabetes Paternal Grandfather     Substance Abuse Paternal Grandfather     Eye Disorder Son         ptosis    Depression Son     Anxiety Disorder Son     Heart Disease Paternal Aunt     Diabetes Paternal Aunt     Diabetes Paternal Uncle     Heart Disease Paternal Uncle     Depression Nephew     Anxiety Disorder Nephew     Thyroid Disease Nephew     Diabetes Type 2  Cousin         paternal cousin    Autoimmune Disease Cousin     Autoimmune Disease Sister     Depression Sister     Mental Illness Sister     Substance Abuse Sister     Thyroid Disease Sister     Depression Son     Mental Illness Son     Thyroid Disease Nephew        Social History:  Social History     Social History Narrative     with 3 children and a dog.  No smoking, etoh or drug use.  Worked as a  for [employer] in [employer] in the past.  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     Physical Exam:  Vitals: /77 (BP Location: Right arm, Patient Position: Sitting, Cuff Size: Adult Regular)   Pulse 106   Resp 16   Wt 60.6 kg (133 lb 8 oz)   SpO2 96%   BMI 20.30 kg/m    BMI= Body mass index is 20.3  kg/m .  General:  Appearance is normal, no acute distress  HEENT:  NC/AT, sclera appear white  Neck:  No obvious thyromegaly  CV/Lungs:  Non distressed breathing  Skin:  No apparent rashes  Neuro:  Normal mental status  Psych:  Normal affect          Assessment:  1.  Post pancreatectomy diabetes mellitus, s/p total pancreatectomy and islet autotransplant.  (ICD-10 E89.1)  2.  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   Gastroparesis  4.  Nausea, vomiting      Dinora is a 58 year old with history of chronic pancreatitis who is s/p total pancreatectomy and islet autotransplant.  She has been insulin independent with A1c in goal range.    Her major glycemic issue has been hypoglycemia historically but today I am concerned about mild hyperglycemia.  Because of her severe gastroparesis, I am concerned that even mild hyperglycemia may exacerbate this and we both agreed in discussion today that it would be reasonable at this point to start a small dose of basal insulin.  Reviewed plan below and discussed/reviewed insulin administration.     Plan:  1.  Changes to current diabetes regimen:  Patient Instructions   1)  Start glargine (Semglee, Basaglar, Lantus) at 3 units per day- at the same time each day (probably morning based on your pattern).    2)  Here is a resource for injection sites.   https://[email or web address]/[email or web address]-injections/site-rotation  Make sure to do a 2 unit air prime first, and then inject and count to 10 before pulling the needle back out.     2.  Frequency of blood sugar checks:  Keep wearing Yandy-- this is much better for Dinora than fingersticks because with fingersticks we miss the really critical data of the post prandial excursions.    3.  Continue routine follow up for autoislet transplant patients:  Mixed meal test (6 mL/kg BoostHP to max of 360 mL) at 3  months, 6 months, and once a year post transplant.  Hemoglobin A1c levels at these time points and quarterly.    4.  Other issues addressed today:  none    Follow up:  4 mos    Contact me for questions at [phone] or [phone].  Emergency number to reach pediatric endocrinology after hours is [phone].      Dr. Gloria Melissa MD  Professor, Pediatric Endocrinology  [facility]  Phone:  [phone]  Electronically signed: September 8, 2023 at 10:31 AM        Review of the result(s) of each unique test - HbA1c, jim  40 minutes spent on the date of the encounter doing chart review, history and exam, documentation, downloading and reviewing CGM data,  and further activities per the note

## 2023-09-10 ENCOUNTER — HEALTH MAINTENANCE LETTER (OUTPATIENT)
Age: 58
End: 2023-09-10

## 2023-09-12 ENCOUNTER — TELEPHONE (OUTPATIENT)
Dept: TRANSPLANT | Facility: CLINIC | Age: 58
End: 2023-09-12
Payer: COMMERCIAL

## 2023-09-12 NOTE — TELEPHONE ENCOUNTER
Prior Authorization Retail Medication Request    Medication/Dose:  Disp Refills Start End MARCOS  Insulin Glargine-yfgn (SEMGLEE, YFGN,) 100 UNIT/ML SOPN  Inject 3 Units Subcutaneous every 24 hours - Subcutaneous           ICD code (if different than what is on RX):    Previously Tried and Failed:    Rationale:      Insurance Name:    Insurance ID:        Pharmacy Information (if different than what is on RX)  Name:    Phone:

## 2023-09-12 NOTE — TELEPHONE ENCOUNTER
Prior Authorization Not Needed per Insurance    Medication:  Disp Refills Start End MARCOS  Insulin Glargine-yfgn (SEMGLEE, YFGN,) 100 UNIT/ML SOPN           Insurance Company: [employer] - Phone [phone] Fax [phone]  Expected CoPay:      Pharmacy Filling the Rx: [facility] - [place], [place] - [facility] [address]  Pharmacy Notified: Yes  Patient Notified: Yes      Paid test claim for Insulin Glargine yfgn 100 unit/ml pen.    Let pharmacy know to process as  Insulin Glargine yfgn 100 unit/ml pen.  Paid claim with $0 copay

## 2023-09-19 ASSESSMENT — HEADACHE IMPACT TEST (HIT 6)
HIT6 TOTAL SCORE: 67
HOW OFTEN HAVE YOU FELT FED UP OR IRRITATED BECAUSE OF YOUR HEADACHES: SOMETIMES
HOW OFTEN DID HEADACHS LIMIT CONCENTRATION ON WORK OR DAILY ACTIVITY: VERY OFTEN
WHEN YOU HAVE A HEADACHE HOW OFTEN DO YOU WISH YOU COULD LIE DOWN: ALWAYS
HOW OFTEN DO HEADACHES LIMIT YOUR DAILY ACTIVITIES: VERY OFTEN
WHEN YOU HAVE HEADACHES HOW OFTEN IS THE PAIN SEVERE: VERY OFTEN
HOW OFTEN HAVE YOU FELT TOO TIRED TO WORK BECAUSE OF YOUR HEADACHES: VERY OFTEN

## 2023-09-21 ENCOUNTER — DOCUMENTATION ONLY (OUTPATIENT)
Dept: INTERNAL MEDICINE | Facility: CLINIC | Age: 58
End: 2023-09-21
Payer: COMMERCIAL

## 2023-09-21 NOTE — PROGRESS NOTES
Type of Form Received:     Form Received (Date) 9/21/23   Form Filled out Yes, 9/25/23   Placed in provider folder Yes

## 2023-09-22 DIAGNOSIS — Z53.9 DIAGNOSIS NOT YET DEFINED: Primary | ICD-10-CM

## 2023-09-25 ENCOUNTER — OFFICE VISIT (OUTPATIENT)
Dept: NEUROLOGY | Facility: CLINIC | Age: 58
End: 2023-09-25
Payer: COMMERCIAL

## 2023-09-25 DIAGNOSIS — G43.719 INTRACTABLE CHRONIC MIGRAINE WITHOUT AURA AND WITHOUT STATUS MIGRAINOSUS: Primary | ICD-10-CM

## 2023-09-25 PROCEDURE — 64615 CHEMODENERV MUSC MIGRAINE: CPT | Performed by: NURSE PRACTITIONER

## 2023-09-25 NOTE — PROGRESS NOTES
"PROCEDURE NOTE:    [facility]  Botulinum Toxin Procedure    Headache Neurology    September 25, 2023    Procedure:  OnabotulinumtoxinA injections for chronic migraine  Indication:  Chronic migraine    xxx suffers from severe intractable headaches.  She was referred by for onabotulinumtoxinA injections for headache.  Risks, benefits, and alternatives were discussed.  All questions were answered and consent given.  She decided to proceed with the injections.     Headache history, from initial consult note: See Initial Headache Clinic visit on 8/3/2022 for details    Prior to initiation of botulinum toxin injections, Ms. Mcghee reported 13-15 headache days per month, with 3-4 severe headache days per month. Her headaches are quite disabling and often interfere with her ability to function normally.    Date of last injections: 6/19/2023    Ms. Mcghee reports 2 headache days per month currently and more headaches in the 3 weeks leading to Botox -11 headache days in 21 day, with none in the 10 weeks but 6 really severe headache days in the past 3 weeks .  She has noticed a wearing off phenomenon prior to this round of botulinum toxin injections, lasting 3 weeks.    Botulinum toxin injections have improved their functioning: able to read better, walking and not in bed     She has attempted other migraine prophylactic treatments in the past, which have included: Amitriptyline for 15 years and stopped   Topiramate  Sumatriptan   lyrica-\"brain fog\"  depakote -allergies  Compazine +benedryl  Promethazine IV three times per week for   scopalamine patch     She currently takes amitriptyline, promethazine, zolmitriptine for headache prevention.    Ms. Skinners pain was assessed prior to the procedure.  She rated her pain today as 6 out of 10.    Procedural Pause: Procedural pause was conducted to verify correct patient identity, procedure to be performed, correct side and site, correct patient position, and special " requirements. Appropriate hand hygiene was utilized, and each injection site was prepped with alcohol wipe or Chloraprep swab.     Procedure Details: 200 units of onabotulinumtoxinA was diluted in 4 mL 0.9% normal saline. A total of 155 units of onabotulinumtoxinA were injected using 30 gauge 0.5 in needles into the muscles listed below. 45 units of onabotulinumtoxinA were wasted.       GOAL OF PROCEDURE:  The goal of this procedure is to decrease pain and enhance functional independence.    CONSENT:  The risks, benefits, and treatment options were discussed with the patient who agreed to proceed.    Written consent was obtained     EQUIPMENT USED:  Needles-30 gauge, 0.5 inches for injections  Four 1-ml tuberculin syringes for injections  One sodium chloride 10 ml vial preservative free  Alcohol swabs    SKIN PREPARATION:  Skin preparation was performed using an alcohol wipe.      AREA/MUSCLE INJECTED:  155 units of Botox  Right upper Trapezius (upper cervical) - 5 units of Botox at 3 site/s.   Left upper Trapezius (upper cervical) - 5 units of Botox at 3 site/s.     Right cervical paraspinals - 5 units of Botox at 2 site/s.   Left cervical paraspinals - 5 units of Botox at 2 site/s.     Left occipitalis - 5 units of Botox at 3 site/s.  Right occipitalis -5 units of Botox at 3 site/s    Right Frontalis - 5 units of Botox at 2 site/s.  Left Frontalis - 5 units of Botox at 2 site/s.    Right Temporalis - 5 units of Botox at 4 site/s.  Left Temporalis - 5 units of Botox at 4 site/s.    Right  - 5 units of Botox at 1 site/s.              Left  - 5 units of Botox at 1 site/s.    Procerus - 5 units of Botox at 1 site/s.    RESPONSE TO PROCEDURE:  tolerated the procedure well and there were no immediate complications.  Patient was allowed to recover for an appropriate period of time and was discharged home in stable condition.    FOLLOW UP:    Repeat Botox injections in 12 weeks      This procedure was  performed under a hospital privileging agreement with Dr. Finn العلي, APRN, [facility]

## 2023-09-25 NOTE — LETTER
"9/25/2023       RE: Dinora Mcghee  [address] [facility] [address]     Dear Colleague,    Thank you for referring your patient, Dinora Mcghee, to the [facility] [place] at [facility]. Please see a copy of my visit note below.    PROCEDURE NOTE:    [facility]  Botulinum Toxin Procedure    Headache Neurology    September 25, 2023    Procedure:  OnabotulinumtoxinA injections for chronic migraine  Indication:  Chronic migraine    xxx suffers from severe intractable headaches.  She was referred by for onabotulinumtoxinA injections for headache.  Risks, benefits, and alternatives were discussed.  All questions were answered and consent given.  She decided to proceed with the injections.     Headache history, from initial consult note: See Initial Headache Clinic visit on 8/3/2022 for details    Prior to initiation of botulinum toxin injections, Ms. Mcghee reported 13-15 headache days per month, with 3-4 severe headache days per month. Her headaches are quite disabling and often interfere with her ability to function normally.    Date of last injections: 6/19/2023    Ms. Mcghee reports 2 headache days per month currently and more headaches in the 3 weeks leading to Botox -11 headache days in 21 day, with none in the 10 weeks but 6 really severe headache days in the past 3 weeks .  She has noticed a wearing off phenomenon prior to this round of botulinum toxin injections, lasting 3 weeks.    Botulinum toxin injections have improved their functioning: able to read better, walking and not in bed     She has attempted other migraine prophylactic treatments in the past, which have included: Amitriptyline for 15 years and stopped   Topiramate  Sumatriptan   lyrica-\"brain fog\"  depakote -allergies  Compazine +benedryl  Promethazine IV three times per week for   scopalamine patch     She currently takes amitriptyline, promethazine, " zolmitriptine for headache prevention.    Ms. Cunningham pain was assessed prior to the procedure.  She rated her pain today as 6 out of 10.    Procedural Pause: Procedural pause was conducted to verify correct patient identity, procedure to be performed, correct side and site, correct patient position, and special requirements. Appropriate hand hygiene was utilized, and each injection site was prepped with alcohol wipe or Chloraprep swab.     Procedure Details: 200 units of onabotulinumtoxinA was diluted in 4 mL 0.9% normal saline. A total of 155 units of onabotulinumtoxinA were injected using 30 gauge 0.5 in needles into the muscles listed below. 45 units of onabotulinumtoxinA were wasted.       GOAL OF PROCEDURE:  The goal of this procedure is to decrease pain and enhance functional independence.    CONSENT:  The risks, benefits, and treatment options were discussed with the patient who agreed to proceed.    Written consent was obtained     EQUIPMENT USED:  Needles-30 gauge, 0.5 inches for injections  Four 1-ml tuberculin syringes for injections  One sodium chloride 10 ml vial preservative free  Alcohol swabs    SKIN PREPARATION:  Skin preparation was performed using an alcohol wipe.      AREA/MUSCLE INJECTED:  155 units of Botox  Right upper Trapezius (upper cervical) - 5 units of Botox at 3 site/s.   Left upper Trapezius (upper cervical) - 5 units of Botox at 3 site/s.     Right cervical paraspinals - 5 units of Botox at 2 site/s.   Left cervical paraspinals - 5 units of Botox at 2 site/s.     Left occipitalis - 5 units of Botox at 3 site/s.  Right occipitalis -5 units of Botox at 3 site/s    Right Frontalis - 5 units of Botox at 2 site/s.  Left Frontalis - 5 units of Botox at 2 site/s.    Right Temporalis - 5 units of Botox at 4 site/s.  Left Temporalis - 5 units of Botox at 4 site/s.    Right  - 5 units of Botox at 1 site/s.              Left  - 5 units of Botox at 1 site/s.    Procerus - 5  units of Botox at 1 site/s.    RESPONSE TO PROCEDURE:  tolerated the procedure well and there were no immediate complications.  Patient was allowed to recover for an appropriate period of time and was discharged home in stable condition.    FOLLOW UP:    Repeat Botox injections in 12 weeks      This procedure was performed under a hospital privileging agreement with Dr. Briseno              Again, thank you for allowing me to participate in the care of your patient.      Sincerely,    NATALY Hoffman CNP

## 2023-09-26 NOTE — NURSING NOTE
Is the patient currently in the state of [place]? YES    Visit mode:VIDEO    If the visit is dropped, the patient can be reconnected by: VIDEO VISIT: Send to e-mail at: [email or web address]    Will anyone else be joining the visit? NO      How would you like to obtain your AVS? MyChart    Are changes needed to the allergy or medication list? YES: Pt would like to discuss a few medications, please see notes on list    Reason for visit: Hospital follow up; colonoscopy results and MRCP nodule on lung, also discussion about pain nellie Sawant [facility]     Need to reschedule CPE as it was not scheduled as a 30 minute appointment slot.

## 2023-09-27 ENCOUNTER — DOCUMENTATION ONLY (OUTPATIENT)
Dept: INTERNAL MEDICINE | Facility: CLINIC | Age: 58
End: 2023-09-27
Payer: COMMERCIAL

## 2023-09-27 NOTE — PROGRESS NOTES
Type of Form Received:     Form Received (Date) 09/25/23   Form Filled out Yes, 9/26 mailed to patient   Placed in provider folder Yes

## 2023-10-04 ENCOUNTER — OFFICE VISIT (OUTPATIENT)
Dept: GASTROENTEROLOGY | Facility: CLINIC | Age: 58
End: 2023-10-04
Attending: INTERNAL MEDICINE
Payer: COMMERCIAL

## 2023-10-04 VITALS
SYSTOLIC BLOOD PRESSURE: 102 MMHG | DIASTOLIC BLOOD PRESSURE: 70 MMHG | HEIGHT: 68 IN | BODY MASS INDEX: 19.87 KG/M2 | OXYGEN SATURATION: 99 % | HEART RATE: 94 BPM | WEIGHT: 131.1 LBS

## 2023-10-04 DIAGNOSIS — K21.9 GASTROESOPHAGEAL REFLUX DISEASE WITHOUT ESOPHAGITIS: ICD-10-CM

## 2023-10-04 DIAGNOSIS — K31.84 GASTROPARESIS: ICD-10-CM

## 2023-10-04 DIAGNOSIS — Z94.83 STATUS POST PANCREAS TRANSPLANTATION (H): ICD-10-CM

## 2023-10-04 DIAGNOSIS — K59.00 CONSTIPATION, UNSPECIFIED CONSTIPATION TYPE: ICD-10-CM

## 2023-10-04 DIAGNOSIS — Z93.4 SMALL BOWEL TUBE FEEDING (H): ICD-10-CM

## 2023-10-04 PROCEDURE — 99215 OFFICE O/P EST HI 40 MIN: CPT | Performed by: INTERNAL MEDICINE

## 2023-10-04 ASSESSMENT — PAIN SCALES - GENERAL: PAINLEVEL: SEVERE PAIN (6)

## 2023-10-04 NOTE — LETTER
10/4/2023         RE: Dinora Mcghee  [address] [facility] [address]        Dear Colleague,    Thank you for referring your patient, Dinora Mcghee, to the [facility] [place]. Please see a copy of my visit note below.    GI CLINIC VISIT    CC:  follow-up      ASSESSMENT/PLAN:    (K31.84) Gastroparesis  (K59.00) Constipation, unspecified constipation type  (Z93.4) Small bowel tube feeding (H)  (K21.9) Gastroesophageal reflux disease without esophagitis  (Z94.83) Status post pancreas transplantation (H)        Severe gastroparesis, with marked nausea/vomiting, pain, inability to tolerate po intake. Unresponsive to medications (promotility agents, domperidone, antiemetic regimen, etc). Continues with G-tube for venting (currently near continuous). With surgical J-tube for TFs which she is generally tolerating. Not currently taking po due to symptoms (pain, fullness, nausea, vomiting). Continue with antiemetics- consider trial of antihistamine for breakthrough nausea- already has benadryl script - trial that. Future consideration could be hydroxyzine.      Constipation is in a good place - had a break off of Motegrity and improved output with restart. Continue bowel regimen as outlined below.      GERD in a better place with IV PPI.       - continue Motegrity  - continue senna 5 mL BID per J-tube   - ok for Miralax as needed for constipation  - ok for periodic bowel cleanouts - ok for Miralax and/or enemas  - consider use of glycerin suppository or Fleets enema if increased rectal/LLQ symptoms and/or hard stools/difficulty evacuating  - continue IV pantoprazole  - continue scopolamine as needed  - try benadryl 25 mg - 50 mg liquid during nausea episode and monitor response  - follow-up in GI clinic in 12-16 weeks    It was a pleasure to participate in the care of this patient; please contact us with any further questions.  A total of 45 face-to-face minutes was spent with  "this patient, >50% of which was counseling regarding the above delineated issues. An additional 20 minutes was spent on the date of the encounter doing chart review, documentation, care coordination including discussion with TPIAT team, and further activities as noted above. Additional (unbilled) time was spent on other days coordinating care and on documentation.    Vijaya Genao MD   of Medicine  Division of Gastroenterology, Hepatology and Nutrition  [facility]    ---------------------------------------------------------------------------------------------------  HPI:  Ms. 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 She was initially seen in general GI clinic by this writer on 11/1/2017 and has been/ seen by myself and ANA Gan since that time. Her pancreas txplt surgeon was Dr. Phoenix; she now follows with Dr. Honeycutt. She also follows with Dr. Park of GI in Pan[facility]Biliary Clinic. Her last visit with me was 6/22/2022, though she has been seen in Pancreas Clinic in the interim.       Since her last visit, she reports that she had an \"ok summer\".  She had 6 weddings to attend, and was only able to get to three of them. She did experience nausea and symptoms at all the weddings she made it to. She  felt she did the best when she was NPO for two weeks (only J-feeds) for the most recent weddings.      Due to her significant and severe symptoms she is working on social security filings and appeals.     She did have a scab overlying her port which came off. There did not appear to be signs of infection (pus, " warmth, pain) when this happened. The site has been doing ok; she is watching it.      History summarized and updated from previous GI documentation. Please see previous notes for further details      Gastroparesis, nausea and vomiting  Venting G-tube  Tube feeds via J-tube  Initially diagnosed years ago with 2-hour study gastric emptying study at [facility]. Repeat testing here with 4-hour GES on 6/27/2016 with 49% remaining at 4 hours (?on pain meds at the time).  This was addressed with prior GJ tube in fall 2016, NG tube used for venting. Ultimately, after TPIAT, she was able to wean off of TFs and return to a regular diet.  Patient did have some persisting nausea and vomiting which she treated over the years with various medications including scopolamine patch, prochlorperazine, and promethazine. For GP in the past she had no response to metoclopramide. She has not been able to try erythromycin or azithromycin due to anaphylactic event with prior azithromycin use (in ED in 2008) She has also been seen by neurology and psychology to assist with insomnia and with migraines (and any contribution they may have to nausea).       Unfortunately, things acutely worsened in November 2020.  An ED evaluation at that time was unremarkable and CT only revealed moderate stool burden, nonspecific fluid in small intestine.  Theses episodes continued to recur.      Bowel regimen was adjusted to ensure adequate output. Dietary changes were made (freq, small meals to liquid-only) and support with antiemetics. Initially these dietary changes seemed to help, but over time the response waned. Another course of rifaximin (previously successful at addressing bloating and stool issues) was not helpful for her pain, bloating/fullness, and n/v. Amitriptyline was increased to 60 mg nightly with no change in discomfort.    From winter 2020 to May/Saba she lost about 16 lbs (from 156-->140 lbs). She continued to experience more pronounced  post-prandial pain and fullness and had vomiting of food after eating. Work-up for alternate causes (with SBFT, CTE, CTA) was normal.       A GJ was placed in Sept 2021, but this was replaced a few times due to J-tube coiling in stomach as well as G-tube clogging/malfunction. On 10/19/21 she had placement of a weighted GJ but continued to struggle with discomfort at insertion site, clogging/venting issues. Ultimately a direct jejunostomy was done and after struggling with this for awhile it was removed. She worked hard to return to an oral diet.      By summer 2022, Ms. Loo had only a G-tube for venting.  At that time she was tolerating one small plate/1 cup/1 bowl of food without vomiting, 70% of the time. She would have 5-6 of these servings a day (~6870-8192 Kcal). Ms. Loo also had regular protein supplement drinks and hydration drinks. Her weight stabilized around 140 lbs. For nausea, she continued on her chronic scopolamine patch as well as prochlorperazine and promethazine about every 6-8 hours.       Later in 2022 she had progressive decline in her ability to tolerate oral intake and continued to lose weight. She was initiated on domperidone by Dr. Mandeep Park, but did not have response even with up-titration of dosing. Small bowel feeds were discussed and her G (KISHORE-KEY) was replaced with a G-J a couple times, but had issues with J-tube extension coiling. Ultimately on 12/30/22, Dr. Bauer placed a direct J in the OR; extensive BAUDILIO and a resection of 15 cm of SB with dense adhesions was also done. TFs were started and continued in earnest from that point on.      In Jan 2023, unfortunately a perforation occurred during G and J tube exchange. She went to the OR that day. Jejunal perforation was repaired with bowel resection and additional BAUDILIO was performed. She was hospitalized thereafter and TFs were restarted.     Though early 2023, she felt better on just jejunal feeds with reduction in emesis,  though still experienced nausea  for which she took: 1 scopolamine patch daily, IV promethazine once daily with J-tube promethazine at night, and compazine 10 mg - once daily. Her TFs were running at 45 mL for 18 hrs a day with oral intake is just bites of food for pleasure. She continued to vent her G-tube frequently - mostly seeing clear, colored liquid - no TFs. She continued on Relizorb digestive enzyme cartridge and did an IVF bolus via port each day.     Today, Ms. Loo reports she was recently seen in the ED due to symptoms concerning for SBO, though imaging was unremarkable. She stated she had two hours of diarrhea after leaving the ED and felt markedly better.     Currently she is vomiting about 5 times a week. This is better with her doing 24/7 venting. She has very little in her emesis, unless it occurs shortly after her pills are given via the G-tube. In addition to her chronic antiemetics, she is now on buprenorphine patch and medical MJ both of which she finds helpful.    She is doing only J-tube feeds and is not tolerating things by mouth at all. She did feel she had leaking around the J-tube, but this has since decreased. She reiterates that she very much does not want to re-do the tube unless absolutely necessary.      Constipation/irregular bowel movements/bloating  Patient reports history of ongoing constipation for over 20 years after having her first child.  She has tried multiple interventions including regular bowel cleanouts, MiraLAX, senna, milk of magnesium, Linzess, and Amitiza for which she was on when she first came to  GI clinic.  Note, patient has been on Creon in the past (currently on Relizorb digestive enzyme cartridge).    Amitiza had worked for awhile then lost effectiveness. She was trialed on Trulance but reported frequent loose stools in the initial days (including nocturnal stooling and incontinence) which prompted her to stop. Trial of rifaximin in the past with with  "improvement in bloating and stool consistency though, as stated above, a re-trial for her pain, n/v, and fullness was not successful.    Motegrity was then started with some success, though noted HAs. We discussed a possible switch in her regimen to address this, though she was most comfortable with continuing Motegrity and monitoring her symptoms.  Previously discussed combining daily constipation medications; noted patient reports cost for Amitiza was quite a lot  (running $1000+/month).     By 2022, she was on Motegrity daily as well as Miralax 1 capful BID, 4 senna a day, 1000 mg Mag citrate daily, and stool softeners. With this she would have no BMs for 3-4 days (though has gone anywhere from 1-8 days), then have a day of emptying where she has multiple BMs over a day. These were often \"enormous\" and she felt empty when complete. Noted her bowel pattern is complicated by her need for antiemetics. When she is \"backed up\" she will experience early satiety.       At her last visit, she felt BMs were good since starting TFs. She stopped Motegrity/prucalopride around 12/30/2022. She was taking only 2.5 mL of liquid senna BID (total 8.8 mg daily).  With this Ms. Loo, was moving her bowels 3-4 days out of a week. On the days she moved her bowels it was typically more than once (2-3 times). Stool consistency was soft, \"ribbon-like\" stools. She denied straining. When asked about HAs she did feel these were better - unclear if this is because she went off Motegrity or if due to treatment of migraines (now getting Botox injections.)    Today, she's since gone back on Motegrity and she feels like it's working as well as it did initially; she wonders if taking a break was helpful. Is on Senna twice daily (5 mL) with Miralax prn. She continues on amitriptyline (discomfort). With this she has a BM 5 out of 7 days , nearly always in the morning. She may have a few more  BMs later in the day, though she's done by early " "afternoon. Stools are generally formed, smooth, and \"sticky\", occasionally loose. Her biggest concern is discomfort when she feels liquid sloshing through and loose stools don't immediately come.     She is struggling a little with Reliazorb as she has had some issues with tube kinking overnight- has to wake up at night to change it.         GERD, Dysphagia   Summarized/taken from prior documentation  Patient experiences GERD as substernal and throat burning with nocturnal relaxant causing choking. She reports a prior Schatzki's ring requiring previous dilation. Manometry in 2015 was normal, and pH impedence at that time had a DeMeester of 24 with positive symptoms association. Patient has treated GERD symptoms with some combination of BID PPI, Carafate, H2 blocker, and Tums in the past. She'd had issues with access to liquid PPI due to insurance and liquid famotidine had not be sufficient for symptoms. Omeprazole by mouth seemed to never pass the stomach/get absorbed and would come out the G-tube when vented even when clamped for an hour or so (clamping longer led to more severe pain and symptoms).      After her last visit, we were able to get her access to IV pantoprazole. Today, she reports this has been helpful for her GERD symptoms.         Pain:   In regards to pain she had followed initially in our pain clinic for management of bulging disk as well as chronic abdominal pain for which she previously had local injections. Ms. Loo shares that much of her abdominal pain is a lot better after BAUDILIO and her first SB resection.  She was off of all pain medications except Tylenol and methycarbomal (for back pain) until her surgery and hospitalization for management of her perforation. She's continued to have pain at both her G and J-tube site.     Today, she still reports pain at G and J tube sites. She's also noted occasional lower abdominal pain; she wonders if this is related to her BMs and if stool is " "\"caught\".     She follows with [facility] at this time. She is enrolled in spinal cord stimulator study and hoping thi helps with pain. She was told this may have some impact on her nausea as well.         PROBLEM LIST  Patient Active Problem List    Diagnosis Date Noted    Cholangitis (H28) 06/07/2023     Priority: Medium    On total parenteral nutrition (TPN) 06/04/2023     Priority: Medium    Fever, unspecified fever cause 06/04/2023     Priority: Medium    Chronic pancreatitis, unspecified pancreatitis type (H) 06/04/2023     Priority: Medium    History of food intolerance 05/09/2023     Priority: Medium    Malnutrition, unspecified type (H24) 05/09/2023     Priority: Medium    Nausea and vomiting, unspecified vomiting type 05/09/2023     Priority: Medium    Major depression, single episode 03/06/2023     Priority: Medium    Acute postoperative abdominal pain 01/31/2023     Priority: Medium    Feeding intolerance 12/30/2022     Priority: Medium    Myofascial pain 10/14/2022     Priority: Medium     Added automatically from request for surgery [id number]        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  pancreas and spleen removed, islet cells transplanted into liver    3 classes of vaccines needed .    1. Pneumococcal vaccines are as follows:       PCV 13 - one time dose (was given 2017)       PPSV23 - (given 12/1/16); repeat q 5 years      ROLE of PCV 20???    2. Meningococcal vaccines     Menactra - (last given 12/1/16) -  repeat q 5 yrs   NOTE: need to wait 4 wks after PCV 13 has been given.   OR - give Menveo instead as it can be given same time as PCV 13    AND   Bexcero - (got 12/9/16) - does not need to be repeated.     3. The Hib vaccine - (got 12/9/16) - does not need to be repeated.        Poisoning by drug 05/09/2022     Priority: Medium     Formatting of this note might be different from the original.  Per Care " Everywhere review:  All oral, IV and injectable steroids are contraindicated (unless in life threatening situations) in [facility] transplant recipients. They can cause irreversible loss of islet cell function. Please contact patient's transplant care coordinator ADI Gaffney RN at [phone]/pager [phone] and/or endocrinologist prior to administration.        Type 1 diabetes mellitus without complication (H) 05/09/2022     Priority: Medium     Formatting of this note might be different from the original.  ACTUALLY - DM 3c - pathogenic diabetes as no pancreas.  (pancreas and spleen removed, islet cells transplanted into liver)    Seeing ENDO at [facility] - Dr Erum Melissa        Intra-abdominal abscess (H) 12/03/2021     Priority: Medium    Postprocedural intraabdominal abscess 12/03/2021     Priority: Medium    Gastrostomy tube obstruction (H) 11/27/2021     Priority: Medium    Abdominal pain, generalized 09/18/2021     Priority: Medium    Adult failure to thrive 08/16/2021     Priority: Medium     Added automatically from request for surgery [id number]        Hypoglycemia 08/05/2021     Priority: Medium    Iron deficiency anemia 03/22/2019     Priority: Medium    Headache, chronic migraine without aura 09/18/2018     Priority: Medium    Chronic pain syndrome 09/18/2018     Priority: Medium    S/P hernia repair 08/23/2018     Priority: Medium    Incisional hernia, without obstruction or gangrene 05/20/2018     Priority: Medium    Adhesive capsulitis of shoulder, unspecified laterality 11/14/2017     Priority: Medium    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  Excess IgG4  [facility]        Gastroparesis      Priority: Medium    ACP (advance care planning) 03/28/2017     Priority: Medium     Advance Care Planning 3/28/2017:  Receipt of ACP document:  Received: Health Care Directive which was witnessed or notarized on 12/8/16.  Document previously scanned on 1/12/17.  Validation form completed and scanned.  Code Status reflects choices in most recent ACP document..  Confirmed/documented designated decision maker(s).  Added by May Baires   Advance Care Planning Liaison              Pancreatic insufficiency 01/17/2017     Priority: Medium    Diabetes insipidus (H24) 01/05/2017     Priority: Medium     Formatting of this note might be different from the original.  Diabetes Insipidus (DI)  Per external records.  H/o pituitary gland tumor in 20s        Post-pancreatectomy diabetes (H) 12/28/2016     Priority: Medium    Sphincter of Oddi dysfunction 01/18/2016     Priority: Medium    Abdominal pain 06/02/2015     Priority: Medium    S/P ERCP 06/02/2015     Priority: Medium    History of ERCP 04/20/2015     Priority: Medium    Depressive disorder 11/25/2014     Priority: Medium     Formatting of this note might be different from the original.  Depression NOS        Other type of intractable migraine      Priority: Medium     Diagnosis updated by automated process. Provider to review and confirm.        GERD (gastroesophageal reflux disease) 12/01/2010     Priority: Medium    Lumbago 04/18/2005     Priority: Medium     History of L5-S1 degenerative disk disease.          Sensorineural hearing loss 01/10/2005     Priority: Medium     Problem list name updated by automated process. Provider to review        Vertiginous syndrome and labyrinthine disorder 01/10/2005     Priority: Medium     Problem list name updated by automated process. Provider to review    IMO Regulatory Load OCT 2020        Sprain and strain of other specified sites of hip and thigh 12/09/2002     Priority: Medium    Chondromalacia of patella 12/09/2002     Priority: Medium    Need for prophylactic immunotherapy      Priority: Medium     trees, grass, srw, dust mites,  "cat        Allergic rhinitis due to other allergen 12/21/2001     Priority: Medium    Hypothyroidism      Priority: Medium     Problem list name updated by automated process. Provider to review           PERTINENT MEDICATIONS:  Current Outpatient Medications   Medication    acetaminophen (TYLENOL) 325 MG/10.15ML solution    amitriptyline (ELAVIL) 10 MG tablet    amylase-lipase-protease (CREON) 31474-29503 units CPEP per EC capsule    blood glucose (PAT CONTOUR NEXT) test strip    blood glucose monitoring (PAT MICROLET) lancets    buprenorphine (BUTRANS) 7.5 MCG/HR WK patch    cetirizine (ZYRTEC) 10 MG tablet    Continuous Blood Gluc Sensor (FREESTYLE LISA 3 SENSOR) MISC    cyclobenzaprine (FLEXERIL) 10 MG tablet    Digestive Enzyme Cartridge (RELIZORB) MARCO    diphenhydrAMINE (BENADRYL ALLERGY) 25 MG capsule    estradiol (VAGIFEM) 10 MCG TABS vaginal tablet    famotidine (PEPCID) 40 MG/5ML suspension    fluticasone (FLONASE) 50 MCG/ACT nasal spray    glucagon 1 MG kit    glucose 40 % GEL gel    Insulin Glargine-yfgn (SEMGLEE, YFGN,) 100 UNIT/ML SOPN    insulin syringe-needle U-100 (30G X 1/2\" 0.3 ML) 30G X 1/2\" 0.3 ML miscellaneous    levothyroxine (SYNTHROID/LEVOTHROID) 137 MCG tablet    lidocaine (LIDODERM) 5 % patch    methocarbamol (ROBAXIN) 750 MG tablet    montelukast (SINGULAIR) 10 MG tablet    Nutritional Supplements (BOOST HIGH PROTEIN) LIQD    pantoprazole (PROTONIX) 40 mg IV push injection    promethazine 25 mg    promethazine-phenylephrine (PROMETHAZINE VC) 6.25-5 MG/5ML syrup    prucalopride succinate (MOTEGRITY) 2 MG tablet    scopolamine (TRANSDERM) 1 MG/3DAYS 72 hr patch    sennosides (SENOKOT) 8.8 MG/5ML syrup    Sharps Container (BD SHARPS ) MISC    silver nitrate (ARZOL SILVER NIT APPLICATORS) 75-25 % miscellaneous    sodium chloride 0.9% SOLN BOLUS    STATIN NOT PRESCRIBED (INTENTIONAL)    thiamine (B-1) 100 MG tablet    UNABLE TO FIND    zinc oxide - white petrolatum (CRITIC-AID " "THICK MOIST BARRIER) 20-51% PSTE topical paste    ZOLMitriptan (ZOMIG-ZMT) 5 MG ODT    ibuprofen (ADVIL/MOTRIN) 400 MG tablet     Current Facility-Administered Medications   Medication    alteplase (CATHFLO ACTIVASE) injection 2 mg    Botulinum Toxin Type A (BOTOX) 200 units injection 155 Units         PHYSICAL EXAMINATION:  Vitals /70   Pulse 94   Ht 1.727 m (5' 8\")   Wt 59.5 kg (131 lb 1.6 oz)   SpO2 99%   BMI 19.93 kg/m     Wt   Wt Readings from Last 2 Encounters:   10/04/23 59.5 kg (131 lb 1.6 oz)   09/07/23 60.6 kg (133 lb 8 oz)      Gen: Pt sitting up in NAD, interactive and cooperative on exam  Eyes: sclerae anicteric, no injection  ENT:  MMM  Cardiac: RRR, nl S1, S2,   Resp: Clear on anterior exam  GI: Normoactive BS, abd soft,   Skin: Warm, dry, no jaundice,   Neuro: alert, oriented, answers questions appropriately      PERTINENT STUDIES:    Lab on 07/05/2023   Component Date Value Ref Range Status    Culture 07/05/2023 No Growth   Final       Again, thank you for allowing me to participate in the care of your patient.      Sincerely,    Vijaya Genao MD  "

## 2023-10-04 NOTE — PROGRESS NOTES
GI CLINIC VISIT    CC:  follow-up      ASSESSMENT/PLAN:    (K31.84) Gastroparesis  (K59.00) Constipation, unspecified constipation type  (Z93.4) Small bowel tube feeding (H)  (K21.9) Gastroesophageal reflux disease without esophagitis  (Z94.83) Status post pancreas transplantation (H)        Severe gastroparesis, with marked nausea/vomiting, pain, inability to tolerate po intake. Unresponsive to medications (promotility agents, domperidone, antiemetic regimen, etc). Continues with G-tube for venting (currently near continuous). With surgical J-tube for TFs which she is generally tolerating. Not currently taking po due to symptoms (pain, fullness, nausea, vomiting). Continue with antiemetics- consider trial of antihistamine for breakthrough nausea- already has benadryl script - trial that. Future consideration could be hydroxyzine.      Constipation is in a good place - had a break off of Motegrity and improved output with restart. Continue bowel regimen as outlined below.      GERD in a better place with IV PPI.       - continue Motegrity  - continue senna 5 mL BID per J-tube   - ok for Miralax as needed for constipation  - ok for periodic bowel cleanouts - ok for Miralax and/or enemas  - consider use of glycerin suppository or Fleets enema if increased rectal/LLQ symptoms and/or hard stools/difficulty evacuating  - continue IV pantoprazole  - continue scopolamine as needed  - try benadryl 25 mg - 50 mg liquid during nausea episode and monitor response  - follow-up in GI clinic in 12-16 weeks    It was a pleasure to participate in the care of this patient; please contact us with any further questions.  A total of 45 face-to-face minutes was spent with this patient, >50% of which was counseling regarding the above delineated issues. An additional 20 minutes was spent on the date of the encounter doing chart review, documentation, care coordination including discussion with TPIAT team, and further activities as noted  "above. Additional (unbilled) time was spent on other days coordinating care and on documentation.    Vijaya Genao MD   of Medicine  Division of Gastroenterology, Hepatology and Nutrition  [facility]    ---------------------------------------------------------------------------------------------------  HPI:  Ms. 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 She was initially seen in general GI clinic by this writer on 11/1/2017 and has been/ seen by myself and ANA Gan since that time. Her pancreas txplt surgeon was Dr. Phoenix; she now follows with Dr. Honeycutt. She also follows with Dr. Park of GI in [facility]. Her last visit with me was 6/22/2022, though she has been seen in Pancreas Clinic in the interim.       Since her last visit, she reports that she had an \"ok summer\".  She had 6 weddings to attend, and was only able to get to three of them. She did experience nausea and symptoms at all the weddings she made it to. She  felt she did the best when she was NPO for two weeks (only J-feeds) for the most recent weddings.      Due to her significant and severe symptoms she is working on social security filings and appeals.     She did have a scab overlying her port which came off. There did not appear to be signs of infection (pus, warmth, pain) when this happened. The site has been doing ok; she is watching it.      History summarized and updated from previous GI documentation. Please see previous notes for further details      Gastroparesis, nausea and vomiting  Venting G-tube  Tube feeds via " J-tube  Initially diagnosed years ago with 2-hour study gastric emptying study at [facility]. Repeat testing here with 4-hour GES on 6/27/2016 with 49% remaining at 4 hours (?on pain meds at the time).  This was addressed with prior GJ tube in fall 2016, NG tube used for venting. Ultimately, after TPIAT, she was able to wean off of TFs and return to a regular diet.  Patient did have some persisting nausea and vomiting which she treated over the years with various medications including scopolamine patch, prochlorperazine, and promethazine. For GP in the past she had no response to metoclopramide. She has not been able to try erythromycin or azithromycin due to anaphylactic event with prior azithromycin use (in ED in 2008) She has also been seen by neurology and psychology to assist with insomnia and with migraines (and any contribution they may have to nausea).       Unfortunately, things acutely worsened in November 2020.  An ED evaluation at that time was unremarkable and CT only revealed moderate stool burden, nonspecific fluid in small intestine.  Theses episodes continued to recur.      Bowel regimen was adjusted to ensure adequate output. Dietary changes were made (freq, small meals to liquid-only) and support with antiemetics. Initially these dietary changes seemed to help, but over time the response waned. Another course of rifaximin (previously successful at addressing bloating and stool issues) was not helpful for her pain, bloating/fullness, and n/v. Amitriptyline was increased to 60 mg nightly with no change in discomfort.    From winter 2020 to May/Saba she lost about 16 lbs (from 156-->140 lbs). She continued to experience more pronounced post-prandial pain and fullness and had vomiting of food after eating. Work-up for alternate causes (with SBFT, CTE, CTA) was normal.       A GJ was placed in Sept 2021, but this was replaced a few times due to J-tube coiling in stomach as well as G-tube  clogging/malfunction. On 10/19/21 she had placement of a weighted GJ but continued to struggle with discomfort at insertion site, clogging/venting issues. Ultimately a direct jejunostomy was done and after struggling with this for awhile it was removed. She worked hard to return to an oral diet.      By summer 2022, Ms. Loo had only a G-tube for venting.  At that time she was tolerating one small plate/1 cup/1 bowl of food without vomiting, 70% of the time. She would have 5-6 of these servings a day (~4392-5035 Kcal). Ms. Loo also had regular protein supplement drinks and hydration drinks. Her weight stabilized around 140 lbs. For nausea, she continued on her chronic scopolamine patch as well as prochlorperazine and promethazine about every 6-8 hours.       Later in 2022 she had progressive decline in her ability to tolerate oral intake and continued to lose weight. She was initiated on domperidone by Dr. Mandeep Park, but did not have response even with up-titration of dosing. Small bowel feeds were discussed and her G (KISHORE-KEY) was replaced with a G-J a couple times, but had issues with J-tube extension coiling. Ultimately on 12/30/22, Dr. Bauer placed a direct J in the OR; extensive BAUDILIO and a resection of 15 cm of SB with dense adhesions was also done. TFs were started and continued in earnest from that point on.      In Jan 2023, unfortunately a perforation occurred during G and J tube exchange. She went to the OR that day. Jejunal perforation was repaired with bowel resection and additional BAUDILIO was performed. She was hospitalized thereafter and TFs were restarted.     Though early 2023, she felt better on just jejunal feeds with reduction in emesis, though still experienced nausea  for which she took: 1 scopolamine patch daily, IV promethazine once daily with J-tube promethazine at night, and compazine 10 mg - once daily. Her TFs were running at 45 mL for 18 hrs a day with oral intake is just bites  of food for pleasure. She continued to vent her G-tube frequently - mostly seeing clear, colored liquid - no TFs. She continued on Relizorb digestive enzyme cartridge and did an IVF bolus via port each day.     Today, Ms. Loo reports she was recently seen in the ED due to symptoms concerning for SBO, though imaging was unremarkable. She stated she had two hours of diarrhea after leaving the ED and felt markedly better.     Currently she is vomiting about 5 times a week. This is better with her doing 24/7 venting. She has very little in her emesis, unless it occurs shortly after her pills are given via the G-tube. In addition to her chronic antiemetics, she is now on buprenorphine patch and medical MJ both of which she finds helpful.    She is doing only J-tube feeds and is not tolerating things by mouth at all. She did feel she had leaking around the J-tube, but this has since decreased. She reiterates that she very much does not want to re-do the tube unless absolutely necessary.      Constipation/irregular bowel movements/bloating  Patient reports history of ongoing constipation for over 20 years after having her first child.  She has tried multiple interventions including regular bowel cleanouts, MiraLAX, senna, milk of magnesium, Linzess, and Amitiza for which she was on when she first came to [facility].  Note, patient has been on Creon in the past (currently on Relizorb digestive enzyme cartridge).    Amitiza had worked for awhile then lost effectiveness. She was trialed on Trulance but reported frequent loose stools in the initial days (including nocturnal stooling and incontinence) which prompted her to stop. Trial of rifaximin in the past with with improvement in bloating and stool consistency though, as stated above, a re-trial for her pain, n/v, and fullness was not successful.    Motegrity was then started with some success, though noted HAs. We discussed a possible switch in her regimen to address  "this, though she was most comfortable with continuing Motegrity and monitoring her symptoms.  Previously discussed combining daily constipation medications; noted patient reports cost for Amitiza was quite a lot  (running $1000+/month).     By 2022, she was on Motegrity daily as well as Miralax 1 capful BID, 4 senna a day, 1000 mg Mag citrate daily, and stool softeners. With this she would have no BMs for 3-4 days (though has gone anywhere from 1-8 days), then have a day of emptying where she has multiple BMs over a day. These were often \"enormous\" and she felt empty when complete. Noted her bowel pattern is complicated by her need for antiemetics. When she is \"backed up\" she will experience early satiety.       At her last visit, she felt BMs were good since starting TFs. She stopped Motegrity/prucalopride around 12/30/2022. She was taking only 2.5 mL of liquid senna BID (total 8.8 mg daily).  With this Ms. Loo, was moving her bowels 3-4 days out of a week. On the days she moved her bowels it was typically more than once (2-3 times). Stool consistency was soft, \"ribbon-like\" stools. She denied straining. When asked about HAs she did feel these were better - unclear if this is because she went off Motegrity or if due to treatment of migraines (now getting Botox injections.)    Today, she's since gone back on Motegrity and she feels like it's working as well as it did initially; she wonders if taking a break was helpful. Is on Senna twice daily (5 mL) with Miralax prn. She continues on amitriptyline (discomfort). With this she has a BM 5 out of 7 days , nearly always in the morning. She may have a few more  BMs later in the day, though she's done by early afternoon. Stools are generally formed, smooth, and \"sticky\", occasionally loose. Her biggest concern is discomfort when she feels liquid sloshing through and loose stools don't immediately come.     She is struggling a little with Reliazorb as she has had some " "issues with tube kinking overnight- has to wake up at night to change it.         GERD, Dysphagia   Summarized/taken from prior documentation  Patient experiences GERD as substernal and throat burning with nocturnal relaxant causing choking. She reports a prior Schatzki's ring requiring previous dilation. Manometry in 2015 was normal, and pH impedence at that time had a DeMeester of 24 with positive symptoms association. Patient has treated GERD symptoms with some combination of BID PPI, Carafate, H2 blocker, and Tums in the past. She'd had issues with access to liquid PPI due to insurance and liquid famotidine had not be sufficient for symptoms. Omeprazole by mouth seemed to never pass the stomach/get absorbed and would come out the G-tube when vented even when clamped for an hour or so (clamping longer led to more severe pain and symptoms).      After her last visit, we were able to get her access to IV pantoprazole. Today, she reports this has been helpful for her GERD symptoms.         Pain:   In regards to pain she had followed initially in our pain clinic for management of bulging disk as well as chronic abdominal pain for which she previously had local injections. Ms. Loo shares that much of her abdominal pain is a lot better after BAUDILIO and her first SB resection.  She was off of all pain medications except Tylenol and methycarbomal (for back pain) until her surgery and hospitalization for management of her perforation. She's continued to have pain at both her G and J-tube site.     Today, she still reports pain at G and J tube sites. She's also noted occasional lower abdominal pain; she wonders if this is related to her BMs and if stool is \"caught\".     She follows with [facility] at this time. She is enrolled in spinal cord stimulator study and hoping thi helps with pain. She was told this may have some impact on her nausea as well.         PROBLEM LIST  Patient Active Problem List    Diagnosis " Date Noted    Cholangitis (H28) 06/07/2023     Priority: Medium    On total parenteral nutrition (TPN) 06/04/2023     Priority: Medium    Fever, unspecified fever cause 06/04/2023     Priority: Medium    Chronic pancreatitis, unspecified pancreatitis type (H) 06/04/2023     Priority: Medium    History of food intolerance 05/09/2023     Priority: Medium    Malnutrition, unspecified type (H24) 05/09/2023     Priority: Medium    Nausea and vomiting, unspecified vomiting type 05/09/2023     Priority: Medium    Major depression, single episode 03/06/2023     Priority: Medium    Acute postoperative abdominal pain 01/31/2023     Priority: Medium    Feeding intolerance 12/30/2022     Priority: Medium    Myofascial pain 10/14/2022     Priority: Medium     Added automatically from request for surgery [id number]        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  pancreas and spleen removed, islet cells transplanted into liver    3 classes of vaccines needed .    1. Pneumococcal vaccines are as follows:       PCV 13 - one time dose (was given 2017)       PPSV23 - (given 12/1/16); repeat q 5 years      ROLE of PCV 20???    2. Meningococcal vaccines     Menactra - (last given 12/1/16) -  repeat q 5 yrs   NOTE: need to wait 4 wks after PCV 13 has been given.   OR - give Menveo instead as it can be given same time as PCV 13    AND   Bexcero - (got 12/9/16) - does not need to be repeated.     3. The Hib vaccine - (got 12/9/16) - does not need to be repeated.        Poisoning by drug 05/09/2022     Priority: Medium     Formatting of this note might be different from the original.  Per Care Everywhere review:  All oral, IV and injectable steroids are contraindicated (unless in life threatening situations) in Islet Auto transplant recipients. They can cause irreversible loss of islet cell function. Please contact patient's transplant care coordinator ADI  Lidya Gaffney RN at [phone]/pager [phone] and/or endocrinologist prior to administration.        Type 1 diabetes mellitus without complication (H) 05/09/2022     Priority: Medium     Formatting of this note might be different from the original.  ACTUALLY - DM 3c - pathogenic diabetes as no pancreas.  (pancreas and spleen removed, islet cells transplanted into liver)    Seeing ENDO at [facility] - Dr Erum Melissa        Intra-abdominal abscess (H) 12/03/2021     Priority: Medium    Postprocedural intraabdominal abscess 12/03/2021     Priority: Medium    Gastrostomy tube obstruction (H) 11/27/2021     Priority: Medium    Abdominal pain, generalized 09/18/2021     Priority: Medium    Adult failure to thrive 08/16/2021     Priority: Medium     Added automatically from request for surgery [id number]        Hypoglycemia 08/05/2021     Priority: Medium    Iron deficiency anemia 03/22/2019     Priority: Medium    Headache, chronic migraine without aura 09/18/2018     Priority: Medium    Chronic pain syndrome 09/18/2018     Priority: Medium    S/P hernia repair 08/23/2018     Priority: Medium    Incisional hernia, without obstruction or gangrene 05/20/2018     Priority: Medium    Adhesive capsulitis of shoulder, unspecified laterality 11/14/2017     Priority: Medium    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  Excess IgG4  [facility]        Gastroparesis      Priority: Medium    ACP (advance care planning) 03/28/2017     Priority: Medium     Advance Care Planning 3/28/2017: Receipt of ACP document:  Received: Health Care Directive which was witnessed or notarized on 12/8/16.  Document previously scanned on 1/12/17.  Validation form completed and scanned.  Code Status reflects choices in most recent ACP document..  Confirmed/documented  designated decision maker(s).  Added by May Baires   Advance Care Planning Liaison              Pancreatic insufficiency 01/17/2017     Priority: Medium    Diabetes insipidus (H24) 01/05/2017     Priority: Medium     Formatting of this note might be different from the original.  Diabetes Insipidus (DI)  Per external records.  H/o pituitary gland tumor in 20s        Post-pancreatectomy diabetes (H) 12/28/2016     Priority: Medium    Sphincter of Oddi dysfunction 01/18/2016     Priority: Medium    Abdominal pain 06/02/2015     Priority: Medium    S/P ERCP 06/02/2015     Priority: Medium    History of ERCP 04/20/2015     Priority: Medium    Depressive disorder 11/25/2014     Priority: Medium     Formatting of this note might be different from the original.  Depression NOS        Other type of intractable migraine      Priority: Medium     Diagnosis updated by automated process. Provider to review and confirm.        GERD (gastroesophageal reflux disease) 12/01/2010     Priority: Medium    Lumbago 04/18/2005     Priority: Medium     History of L5-S1 degenerative disk disease.          Sensorineural hearing loss 01/10/2005     Priority: Medium     Problem list name updated by automated process. Provider to review        Vertiginous syndrome and labyrinthine disorder 01/10/2005     Priority: Medium     Problem list name updated by automated process. Provider to review    IMO Regulatory Load OCT 2020        Sprain and strain of other specified sites of hip and thigh 12/09/2002     Priority: Medium    Chondromalacia of patella 12/09/2002     Priority: Medium    Need for prophylactic immunotherapy      Priority: Medium     trees, grass, srw, dust mites, cat        Allergic rhinitis due to other allergen 12/21/2001     Priority: Medium    Hypothyroidism      Priority: Medium     Problem list name updated by automated process. Provider to review           PERTINENT MEDICATIONS:  Current Outpatient Medications  "  Medication    acetaminophen (TYLENOL) 325 MG/10.15ML solution    amitriptyline (ELAVIL) 10 MG tablet    amylase-lipase-protease (CREON) 36816-16143 units CPEP per EC capsule    blood glucose (PAT CONTOUR NEXT) test strip    blood glucose monitoring (PAT MICROLET) lancets    buprenorphine (BUTRANS) 7.5 MCG/HR WK patch    cetirizine (ZYRTEC) 10 MG tablet    Continuous Blood Gluc Sensor (FREESTYLE LISA 3 SENSOR) MISC    cyclobenzaprine (FLEXERIL) 10 MG tablet    Digestive Enzyme Cartridge (RELIZORB) MARCO    diphenhydrAMINE (BENADRYL ALLERGY) 25 MG capsule    estradiol (VAGIFEM) 10 MCG TABS vaginal tablet    famotidine (PEPCID) 40 MG/5ML suspension    fluticasone (FLONASE) 50 MCG/ACT nasal spray    glucagon 1 MG kit    glucose 40 % GEL gel    Insulin Glargine-yfgn (SEMGLEE, YFGN,) 100 UNIT/ML SOPN    insulin syringe-needle U-100 (30G X 1/2\" 0.3 ML) 30G X 1/2\" 0.3 ML miscellaneous    levothyroxine (SYNTHROID/LEVOTHROID) 137 MCG tablet    lidocaine (LIDODERM) 5 % patch    methocarbamol (ROBAXIN) 750 MG tablet    montelukast (SINGULAIR) 10 MG tablet    Nutritional Supplements (BOOST HIGH PROTEIN) LIQD    pantoprazole (PROTONIX) 40 mg IV push injection    promethazine 25 mg    promethazine-phenylephrine (PROMETHAZINE VC) 6.25-5 MG/5ML syrup    prucalopride succinate (MOTEGRITY) 2 MG tablet    scopolamine (TRANSDERM) 1 MG/3DAYS 72 hr patch    sennosides (SENOKOT) 8.8 MG/5ML syrup    Sharps Container (BD SHARPS ) MISC    silver nitrate (ARZOL SILVER NIT APPLICATORS) 75-25 % miscellaneous    sodium chloride 0.9% SOLN BOLUS    STATIN NOT PRESCRIBED (INTENTIONAL)    thiamine (B-1) 100 MG tablet    UNABLE TO FIND    zinc oxide - white petrolatum (CRITIC-AID THICK MOIST BARRIER) 20-51% PSTE topical paste    ZOLMitriptan (ZOMIG-ZMT) 5 MG ODT    ibuprofen (ADVIL/MOTRIN) 400 MG tablet     Current Facility-Administered Medications   Medication    alteplase (CATHFLO ACTIVASE) injection 2 mg    Botulinum Toxin Type A " "(BOTOX) 200 units injection 155 Units         PHYSICAL EXAMINATION:  Vitals /70   Pulse 94   Ht 1.727 m (5' 8\")   Wt 59.5 kg (131 lb 1.6 oz)   SpO2 99%   BMI 19.93 kg/m     Wt   Wt Readings from Last 2 Encounters:   10/04/23 59.5 kg (131 lb 1.6 oz)   09/07/23 60.6 kg (133 lb 8 oz)      Gen: Pt sitting up in NAD, interactive and cooperative on exam  Eyes: sclerae anicteric, no injection  ENT:  MMM  Cardiac: RRR, nl S1, S2,   Resp: Clear on anterior exam  GI: Normoactive BS, abd soft,   Skin: Warm, dry, no jaundice,   Neuro: alert, oriented, answers questions appropriately      PERTINENT STUDIES:    Lab on 07/05/2023   Component Date Value Ref Range Status    Culture 07/05/2023 No Growth   Final       "

## 2023-10-04 NOTE — PATIENT INSTRUCTIONS
- continue Motegrity  - continue senna 5 mL BID per J-tube   - ok for Miralax as needed for constipation  - ok for periodic bowel cleanouts - ok for Miralax and/or enemas  - consider use of glycerin suppository or Fleets enema if increased rectal/LLQ symptoms and/or hard stools/difficulty evacuating  - continue IV pantoprazole  - continue scopolamine as needed  - try benadryl 25 mg - 50 mg liquid during nausea episode and monitor response  - follow-up in GI clinic in 12-16 weeks     If you have any questions, please don't hesitate to contact me through our GI RN Clinic Coordinator, Ashley Mane, at [phone].

## 2023-10-05 LAB — HBA1C MFR BLD: 6.3 % (ref 4.3–?)

## 2023-10-06 ENCOUNTER — TELEPHONE (OUTPATIENT)
Dept: GASTROENTEROLOGY | Facility: CLINIC | Age: 58
End: 2023-10-06
Payer: COMMERCIAL

## 2023-10-06 NOTE — TELEPHONE ENCOUNTER
GIANNAM and sent mychart regarding following:    Per Dr Genao, schedule follow-up in February using 2 consecutive slots (80 min total)

## 2023-10-09 ENCOUNTER — PATIENT OUTREACH (OUTPATIENT)
Dept: GASTROENTEROLOGY | Facility: CLINIC | Age: 58
End: 2023-10-09
Payer: COMMERCIAL

## 2023-10-09 NOTE — TELEPHONE ENCOUNTER
Patient called in c/o full body aches, no fever, t 97.1. BP yesterday 90/62. Body aches come and go,not constant, pt breathless with pain when speaking with her during ache flare. Tube  Feeds going ok. Has had flu shot. Has not tested for flu yet.     Encouraged her to check for covid and if negative, to check for flu. Encouraged IV fluids at home and continued cares.     ML

## 2023-10-10 ENCOUNTER — HOSPITAL ENCOUNTER (OUTPATIENT)
Age: 58
End: 2023-10-10
Payer: COMMERCIAL

## 2023-10-10 ENCOUNTER — PATIENT OUTREACH (OUTPATIENT)
Dept: GASTROENTEROLOGY | Facility: CLINIC | Age: 58
End: 2023-10-10
Payer: COMMERCIAL

## 2023-10-10 NOTE — TELEPHONE ENCOUNTER
Patient called in, being admitted to [facility]. Had rigors last night, 102 fever on presentation for ER. WBC in urine, on antibiotics  Hoping we can move her up on the transfer list, message sent to team w/ elizabeth.    DIEGO    ER note:    Upon arrival to the ED her temperature was 39.3 , heart rate was 117, and blood pressure was 102/51. Blood pressure went as low as 84/45.    ED workup was significant for a white count of 22, with neutrophilia. Sed rate of 34, glucose 183, lactate was 1.6. Her influenza and COVID swabs were negative. Urinalysis was cloudy urine, moderate leukocyte esterase, 21-30 wbc's, and bacteria present. Chest x-ray negative for any consolidations or pleural effusions.    Blood cultures were drawn. Patient was started on vancomycin and cefepime. She was given 2 L normal saline bolus. Patient had already given herself 2 L of lactated ringers at home. She administers 1 L LR daily in her port but was advised by her pancreas team to administer an additional L yesterday when she called and told him she was feeling unwell. Her lactate did come down from 1.6 to 0.8.    ED provider attempted to transfer patient to [place], since this is where she doctors her pancreatic team is at the [facility]. However, there were no beds available, she is on the wait list and he is currently 20th in line.

## 2023-10-12 ENCOUNTER — CARE COORDINATION (OUTPATIENT)
Dept: GASTROENTEROLOGY | Facility: CLINIC | Age: 58
End: 2023-10-12
Payer: COMMERCIAL

## 2023-10-12 NOTE — PROGRESS NOTES
Got a call from [place]- [phone]- Yunior Hunt NP    Klebsiella bateremia, likely from her PORT  Wants us to get involved in plan for management per patient request.     Message sent to Dr Park    ML

## 2023-10-17 ENCOUNTER — TELEPHONE (OUTPATIENT)
Dept: INFECTIOUS DISEASES | Facility: CLINIC | Age: 58
End: 2023-10-17
Payer: COMMERCIAL

## 2023-10-18 ENCOUNTER — MEDICAL CORRESPONDENCE (OUTPATIENT)
Dept: HEALTH INFORMATION MANAGEMENT | Facility: CLINIC | Age: 58
End: 2023-10-18
Payer: COMMERCIAL

## 2023-10-18 ENCOUNTER — PATIENT OUTREACH (OUTPATIENT)
Dept: GASTROENTEROLOGY | Facility: CLINIC | Age: 58
End: 2023-10-18
Payer: COMMERCIAL

## 2023-10-18 NOTE — PROGRESS NOTES
Spoke with Dr. Genao and she is ok with the Miralax and the mag citrate into the JT   Spoke with Dinora and she started the Mag and miralax last night then a few hours later she had a large stool and feels much better this morning   She has an appointment with [facility] tomorrow

## 2023-10-20 DIAGNOSIS — E10.9 TYPE 1 DIABETES MELLITUS WITHOUT COMPLICATION (H): Primary | ICD-10-CM

## 2023-10-20 DIAGNOSIS — Z90.410 POST-PANCREATECTOMY DIABETES (H): ICD-10-CM

## 2023-10-20 DIAGNOSIS — E89.1 POST-PANCREATECTOMY DIABETES (H): ICD-10-CM

## 2023-10-20 DIAGNOSIS — E13.9 POST-PANCREATECTOMY DIABETES (H): ICD-10-CM

## 2023-10-23 DIAGNOSIS — G43.109 MIGRAINE WITH AURA AND WITHOUT STATUS MIGRAINOSUS, NOT INTRACTABLE: ICD-10-CM

## 2023-10-23 NOTE — CONFIDENTIAL NOTE
RX Authorization                      Medication:  zolmitriptan 50mg ODT tabs    Date last refill ordered: 10/12/2023    Quantity ordered:    # refills:    Date of last clinic visit with ordering provider:    Date of next clinic visit with ordering provider: 01/03/2024    All pertinent protocol data (lab date/result):    Include pertinent information from patients message:

## 2023-10-24 RX ORDER — ZOLMITRIPTAN 5 MG/1
5 TABLET, ORALLY DISINTEGRATING ORAL
Qty: 12 TABLET | Refills: 6 | Status: SHIPPED | OUTPATIENT
Start: 2023-10-24

## 2023-10-25 ENCOUNTER — DOCUMENTATION ONLY (OUTPATIENT)
Dept: INTERNAL MEDICINE | Facility: CLINIC | Age: 58
End: 2023-10-25
Payer: COMMERCIAL

## 2023-10-25 NOTE — PROGRESS NOTES
Type of Form Received:     Form Received (Date) 10/25/23   Form Filled out Yes 10/30/23   Placed in provider folder Yes

## 2023-10-26 ENCOUNTER — VIRTUAL VISIT (OUTPATIENT)
Dept: ONCOLOGY | Facility: CLINIC | Age: 58
End: 2023-10-26
Attending: SOCIAL WORKER
Payer: COMMERCIAL

## 2023-10-26 VITALS — WEIGHT: 132 LBS | BODY MASS INDEX: 20.07 KG/M2

## 2023-10-26 DIAGNOSIS — Z51.5 PALLIATIVE CARE PATIENT: ICD-10-CM

## 2023-10-26 DIAGNOSIS — F43.23 ADJUSTMENT DISORDER WITH MIXED ANXIETY AND DEPRESSED MOOD: Primary | ICD-10-CM

## 2023-10-26 PROCEDURE — 90834 PSYTX W PT 45 MINUTES: CPT | Mod: 95 | Performed by: SOCIAL WORKER

## 2023-10-26 ASSESSMENT — PAIN SCALES - GENERAL: PAINLEVEL: SEVERE PAIN (6)

## 2023-10-26 NOTE — PROGRESS NOTES
Virtual Visit Details    Type of service:  Video Visit     Originating Location (pt. Location): Home    Distant Location (provider location):  On-site  Platform used for Video Visit: WeGame  Virtual Visit Details    Type of service:  Video Visit     Originating Location (pt. Location): Home    Distant Location (provider location):  On-site  Platform used for Video Visit: AmWell     Telemedicine Visit: The patient's condition can be safely assessed and treated via synchronous audio and visual telemedicine encounter.        Palliative Care Clinical Social Work Return Appointment    PLEASE NOTE:  THIS IS A MENTAL HEALTH NOTE.  OTHER PROVIDERS VIEWING THIS NOTE SHOULD USE THIS ONLY FOR UNDERSTANDING THE CONTEXT OF THE PATIENT'S EXPERIENCE.  TOPICS DESCRIBED IN THIS NOTE SHOULD NOT BE REFERENCED TO THE PATIENT BY MEDICAL PROVIDERS.    Dinora Mcghee is a 58  year old woman with multiple medical conditions including chronic pancreatitis s/p TPAIT, long term complications from gastroparesis, constipation, GERD, migraines.  Had GJ placed Sept 2021, recovery was complicated and she ended up with a venting g-tube.  Due to complications with PO tolerance she had surgical placement of a J tube, and a resection of densely adhered small bowel.  Started on tube feedings, then on TPN.  TPN eventually stopped due to infection.  Has heavy symptom burden that includes chronic nausea and vomiting, fatigue, complex medical management. Seen today to address adjustment disorder with depressed mood and anxiety.       Mental Status Exam:(List all that apply)      Appearance: Appropriate      Eye Contact: Good       Orientation: Yes, x4      Mood: Depressed grieving       Affect: Appropriate -- tearful       Thought Content: Clear         Thought Form: Logical      Psychomotor Behavior: Normal    Visit themes: Grief, loss, illness, symptom burden     Adjustment to Illness:  Recently hospitalized for infection, will need to have port removed  "if infection occurs a 3rd time; symptom burden remains heavy, on tube feeds, vomiting continues to be a difficult symptom to manage Low energy \"I feel good about one day out of every 14 weeks\"           Mental Health (thoughts, feelings, actions, coping, and symptoms):     Was denied social security and had to fill out appeal paper work which felt demoralizing and \"drove home how little I can do now\"-- has felt depressed connected to this \"it feels like a heavy cloak that I can't take off\"     Able to find moments of happiness with family, but feeling very cut off from source of meaning; worries about being a burden on her family; feels physically ill most days      Several losses including the death of a friend who also had pancreatitis, a close family friend's mother, and her sister was just diagnosed with pancreatitis.    Tries to engage in meaningful activities as able including going to watch her daughter compete in a triathalon and going out with her  as able.  HOwever these all also bring on feelings of grief as they are reminders that she cannot do things such as eat socially or compete in athletic events.     Helpful activities: time with family.  Meditating, knitting, tapping.  Uses EMDR techniques.        Helpful cognitions:     Unhelpful and/or triggering or exacerbating factors:     Body-Mind Skills Education: uses tapping and EMDR     Relationships:  and children, extended family    End of Life and Advance Care Planning:     Main therapeutic interventions provided this session include:     Provide psychoeducation and Facilitate processing of thoughts and feelings    Plan:  Will return for psychotherapy with palliative care focus in 1 week    Time spent with patient/family:  50 (Start 12:30, end 1:20)    JAIDA Nguyen, [facility]   Palliative Care Clinical   Ph: [phone]      DO NOT SEND ANY LETTERS              "

## 2023-10-26 NOTE — NURSING NOTE
Is the patient currently in the state of [place]? YES    Visit mode:VIDEO    If the visit is dropped, the patient can be reconnected by: VIDEO VISIT: Text to cell phone:   Telephone Information:   Mobile [phone]    and VIDEO VISIT: Send to e-mail at: [email or web address]    Will anyone else be joining the visit? NO  (If patient encounters technical issues they should call [phone]    How would you like to obtain your AVS? MyChart    Are changes needed to the allergy or medication list? No    Reason for visit: EDWARDO FLETCHER

## 2023-10-26 NOTE — LETTER
10/26/2023         RE: Dinora Mcghee  [address] [facility] [address]        Dear Colleague,    Thank you for referring your patient, Dinora Mcghee, to the [facility] [place]. Please see a copy of my visit note below.    Virtual Visit Details    Type of service:  Video Visit     Originating Location (pt. Location): Home    Distant Location (provider location):  On-site  Platform used for Video Visit: EMUZE  Virtual Visit Details    Type of service:  Video Visit     Originating Location (pt. Location): Home    Distant Location (provider location):  On-site  Platform used for Video Visit: EMUZE     Telemedicine Visit: The patient's condition can be safely assessed and treated via synchronous audio and visual telemedicine encounter.        Palliative Care Clinical Social Work Return Appointment    PLEASE NOTE:  THIS IS A MENTAL HEALTH NOTE.  OTHER PROVIDERS VIEWING THIS NOTE SHOULD USE THIS ONLY FOR UNDERSTANDING THE CONTEXT OF THE PATIENT'S EXPERIENCE.  TOPICS DESCRIBED IN THIS NOTE SHOULD NOT BE REFERENCED TO THE PATIENT BY MEDICAL PROVIDERS.    Dinora Mcghee is a 58  year old woman with multiple medical conditions including chronic pancreatitis s/p TPAIT, long term complications from gastroparesis, constipation, GERD, migraines.  Had GJ placed Sept 2021, recovery was complicated and she ended up with a venting g-tube.  Due to complications with PO tolerance she had surgical placement of a J tube, and a resection of densely adhered small bowel.  Started on tube feedings, then on TPN.  TPN eventually stopped due to infection.  Has heavy symptom burden that includes chronic nausea and vomiting, fatigue, complex medical management. Seen today to address adjustment disorder with depressed mood and anxiety.       Mental Status Exam:(List all that apply)      Appearance: Appropriate      Eye Contact: Good       Orientation: Yes, x4      Mood: Depressed grieving       Affect: Appropriate --  "tearful       Thought Content: Clear         Thought Form: Logical      Psychomotor Behavior: Normal    Visit themes: Grief, loss, illness, symptom burden     Adjustment to Illness:  Recently hospitalized for infection, will need to have port removed if infection occurs a 3rd time; symptom burden remains heavy, on tube feeds, vomiting continues to be a difficult symptom to manage Low energy \"I feel good about one day out of every 14 weeks\"           Mental Health (thoughts, feelings, actions, coping, and symptoms):     Was denied social security and had to fill out appeal paper work which felt demoralizing and \"drove home how little I can do now\"-- has felt depressed connected to this \"it feels like a heavy cloak that I can't take off\"     Able to find moments of happiness with family, but feeling very cut off from source of meaning; worries about being a burden on her family; feels physically ill most days      Several losses including the death of a friend who also had pancreatitis, a close family friend's mother, and her sister was just diagnosed with pancreatitis.    Tries to engage in meaningful activities as able including going to watch her daughter compete in a triathalon and going out with her  as able.  HOwever these all also bring on feelings of grief as they are reminders that she cannot do things such as eat socially or compete in athletic events.     Helpful activities: time with family.  Meditating, knitting, tapping.  Uses EMDR techniques.        Helpful cognitions:     Unhelpful and/or triggering or exacerbating factors:     Body-Mind Skills Education: uses tapping and EMDR     Relationships:  and children, extended family    End of Life and Advance Care Planning:     Main therapeutic interventions provided this session include:     Provide psychoeducation and Facilitate processing of thoughts and feelings    Plan:  Will return for psychotherapy with palliative care focus in 1 " week    Time spent with patient/family:  50 (Start 12:30, end 1:20)    JAIDA Nguyen, [facility]   Palliative Care Clinical   Ph: [phone]      DO NOT SEND ANY LETTERS                Again, thank you for allowing me to participate in the care of your patient.        Sincerely,        VASQUEZ Nguyen

## 2023-10-26 NOTE — LETTER
10/26/2023         RE: Dinora Mcghee  [address] [facility] [address]        Dear Colleague,    Thank you for referring your patient, Dinora Mcghee, to the [facility] [place]. Please see a copy of my visit note below.    Virtual Visit Details    Type of service:  Video Visit     Originating Location (pt. Location): Home    Distant Location (provider location):  On-site  Platform used for Video Visit: Meeps  Virtual Visit Details    Type of service:  Video Visit     Originating Location (pt. Location): Home    Distant Location (provider location):  On-site  Platform used for Video Visit: Meeps     Telemedicine Visit: The patient's condition can be safely assessed and treated via synchronous audio and visual telemedicine encounter.        Palliative Care Clinical Social Work Return Appointment    PLEASE NOTE:  THIS IS A MENTAL HEALTH NOTE.  OTHER PROVIDERS VIEWING THIS NOTE SHOULD USE THIS ONLY FOR UNDERSTANDING THE CONTEXT OF THE PATIENT'S EXPERIENCE.  TOPICS DESCRIBED IN THIS NOTE SHOULD NOT BE REFERENCED TO THE PATIENT BY MEDICAL PROVIDERS.    Dinora Mcghee is a 58  year old woman with multiple medical conditions including chronic pancreatitis s/p TPAIT, long term complications from gastroparesis, constipation, GERD, migraines.  Had GJ placed Sept 2021, recovery was complicated and she ended up with a venting g-tube.  Due to complications with PO tolerance she had surgical placement of a J tube, and a resection of densely adhered small bowel.  Started on tube feedings, then on TPN.  TPN eventually stopped due to infection.  Has heavy symptom burden that includes chronic nausea and vomiting, fatigue, complex medical management. Seen today to address adjustment disorder with depressed mood and anxiety.       Mental Status Exam:(List all that apply)      Appearance: Appropriate      Eye Contact: Good       Orientation: Yes, x4      Mood: Depressed grieving       Affect: Appropriate --  "tearful       Thought Content: Clear         Thought Form: Logical      Psychomotor Behavior: Normal    Visit themes: Grief, loss, illness, symptom burden     Adjustment to Illness:  Recently hospitalized for infection, will need to have port removed if infection occurs a 3rd time; symptom burden remains heavy, on tube feeds, vomiting continues to be a difficult symptom to manage Low energy \"I feel good about one day out of every 14 weeks\"           Mental Health (thoughts, feelings, actions, coping, and symptoms):     Was denied social security and had to fill out appeal paper work which felt demoralizing and \"drove home how little I can do now\"-- has felt depressed connected to this \"it feels like a heavy cloak that I can't take off\"     Able to find moments of happiness with family, but feeling very cut off from source of meaning; worries about being a burden on her family; feels physically ill most days      Several losses including the death of a friend who also had pancreatitis, a close family friend's mother, and her sister was just diagnosed with pancreatitis.    Tries to engage in meaningful activities as able including going to watch her daughter compete in a triathalon and going out with her  as able.  HOwever these all also bring on feelings of grief as they are reminders that she cannot do things such as eat socially or compete in athletic events.     Helpful activities: time with family.  Meditating, knitting, tapping.  Uses EMDR techniques.        Helpful cognitions:     Unhelpful and/or triggering or exacerbating factors:     Body-Mind Skills Education: uses tapping and EMDR     Relationships:  and children, extended family    End of Life and Advance Care Planning:     Main therapeutic interventions provided this session include:     Provide psychoeducation and Facilitate processing of thoughts and feelings    Plan:  Will return for psychotherapy with palliative care focus in 1 " week    Time spent with patient/family:  50 (Start 12:30, end 1:20)    JAIAD Nguyen, [facility]   Palliative Care Clinical   Ph: [phone]      DO NOT SEND ANY LETTERS                Again, thank you for allowing me to participate in the care of your patient.        Sincerely,        VASQUEZ Nguyen

## 2023-10-27 ENCOUNTER — VIRTUAL VISIT (OUTPATIENT)
Dept: GASTROENTEROLOGY | Facility: CLINIC | Age: 58
End: 2023-10-27
Attending: INTERNAL MEDICINE
Payer: COMMERCIAL

## 2023-10-27 DIAGNOSIS — K31.84 GASTROPARESIS: Primary | ICD-10-CM

## 2023-10-27 DIAGNOSIS — G89.4 CHRONIC PAIN SYNDROME: ICD-10-CM

## 2023-10-27 DIAGNOSIS — Z53.9 DIAGNOSIS NOT YET DEFINED: Primary | ICD-10-CM

## 2023-10-27 DIAGNOSIS — K21.9 GASTROESOPHAGEAL REFLUX DISEASE WITHOUT ESOPHAGITIS: ICD-10-CM

## 2023-10-27 DIAGNOSIS — K59.00 CONSTIPATION, UNSPECIFIED CONSTIPATION TYPE: ICD-10-CM

## 2023-10-27 DIAGNOSIS — Z93.4 SMALL BOWEL TUBE FEEDING (H): ICD-10-CM

## 2023-10-27 RX ORDER — PROCHLORPERAZINE MALEATE 10 MG
10 TABLET ORAL EVERY 6 HOURS PRN
COMMUNITY
End: 2024-03-01

## 2023-10-27 NOTE — Clinical Note
10/27/2023         RE: Dinora Mcghee  [address] [place] [facility] [address]        Dear Colleague,    Thank you for referring your patient, Dinora Mcghee, to the [facility]. Please see a copy of my visit note below.    Medication Therapy Management (MTM) Encounter    ASSESSMENT:                            Medication Adherence/Access: {adherencechoices:684782}    ***:   ***    PLAN:                            ***    Follow-up: {followuptest2:505543}  -- Hattie to call after conclusions    SUBJECTIVE/OBJECTIVE:                          Dinora Mcghee is a 58 year old female called for a follow-up visit from ***. {mtmvisitdetails:331346}      Reason for visit: discuss medication dosage form conversion    Allergies/ADRs: {1/2/3/4/5:968189}  Past Medical History: {1/2/3/4/5:368120}  Tobacco: She reports that she quit smoking about 31 years ago. Her smoking use included cigarettes. She started smoking about 38 years ago. She has a 3.00 pack-year smoking history. She does not have any smokeless tobacco history on file.  Alcohol: {ALCOHOL CONSUMPTION HX:573343}    Medication Adherence/Access: {fumedadherence:271080}  -- update diphenhydraine   {MTM SUBJECTIVE (Optional):430292}    ***:     Liquid Senna, acetaminophen, diphenhydramine. She is open to alternate dosage forms such as suppositories. She did start domperidone but had cardiac issues. Feels amitriptyline is helpful. Did re-start motegrity, which has been somewhat helpful. Did respond well to Amitiza, but this stopped working.    Per previous message:    I'd like to get as many of my scripts as possible converted to liquid for the jtube as crushing them and putting into the gtube just sits in my stomach. It makes me nauseated. The pharmacist said I get the best absorption from the iv, then the j and finally the g.      The meds I crush most often are:   Amatryptiline   Motegrity   Robaxin   Flexeril   Levothryoxine   Thiamine    Singular   Zyrtec       Medication Current Administration Tentative Administration   acetaminophen 650 mg as needed Liquid (sugar-free) No change   amitriptyline 40 mg crushed ?    Creon  oral Only with oral food (rare)   Baclofen 5 mg crushed ?   buprenorphine patch No change   cetirizine crushed seasonally   Relizorb  inline filter  No change   Diphenhydramine  liquid No change    estradiol vaginal No change   famotidine liquid No change   fluticasone Nasal spray No change   levothyroxine crushed No change   Lidocaine patch patch No change   methocarbamol 750 mg crushed ?    montelukast crushed ?   pantoprazole IV No change   promethazine Tablets / IV No change   prochlorperazine crushed ?   Motegrity oral ?   scopolamine transdermal No change   sennosides liquid No change (sugar free option?)   thiamine crushed ?   Zolmitriptan OCT dissolved No change     :  Cetirizine 10 mg daily    :  Insulin glargine 7 units nightly     Notes she is on insulin. Does have a glucagon kit at home. Notes she has had to use glucagon about three times in the last month.     :  Butrans 7.5 mcg/HR  Baclofen  Methocarbamol     Notes baclofen is more effective at 10 mg dose than the 5 mg dose. Notes she was supposed to get a spinal cord stimulator today, but had an infection and so they wouldn't do it.    :  Lactated ringers (as fluids)  ----------------  {CHARLIE?:871856}    I spent 49 minutes with this patient today. All changes were made via collaborative practice agreement with Dr. Genao. A copy of the visit note was provided to the patient's provider(s).    A summary of these recommendations was sent via [email or web address].    Hattie Lazo PharmD, BCACP  MTM Pharmacist   [facility]   Phone: [phone]    Telemedicine Visit Details  Type of service:  Telephone visit  Start Time:  9:35 AM  End Time: 10:24 AM       Medication Therapy Recommendations  No medication therapy recommendations to display       Again,  thank you for allowing me to participate in the care of your patient.        Sincerely,        Hattie Lazo RPH

## 2023-10-27 NOTE — PROGRESS NOTES
Medication Therapy Management (MTM) Encounter    ASSESSMENT:                            Medication Adherence/Access: See below for considerations    Medication Current Administration Tentative Administration   acetaminophen 650 mg as needed Liquid (sugar-free) No change   amitriptyline 40 mg crushed -- consider compound   Creon  oral Only with oral food (rare)   Baclofen 5 mg crushed -- consider liquid    buprenorphine patch No change   cetirizine crushed seasonally   Relizorb  inline filter  No change   Diphenhydramine  liquid No change    estradiol vaginal No change   famotidine liquid No change   fluticasone Nasal spray No change   levothyroxine crushed No change   Lidocaine patch patch No change   methocarbamol 750 mg crushed -- could consider IM if needed   montelukast crushed -- does come in chewable   pantoprazole IV No change   promethazine Tablets / IV No change   prochlorperazine crushed -- could consider rectal / injectable   Motegrity oral -- no change   scopolamine transdermal No change   sennosides liquid No change (consider one-lax Senna syrup SF)   thiamine crushed -- consider sublingual   Zolmitriptan OCT dissolved No change     Gastroparesis/Constipation/Small Bowel Tube Feeding/GERD: See above regarding potential administration options for the medications of interest discussed today.     S/p pancreas transplantation: A1c at goal of < 7%.    Pain Management: Encouraged continued follow-up with pain team.    PLAN:                            Hattie will follow-up after investigation of alternate dosing options for amitriptyline, baclofen, methocarbamol, montelukast, prochlorperazine, Motegrity and thiamine.    Follow-up:   -- Hattie will call after all medications are vetted    SUBJECTIVE/OBJECTIVE:                          Dinora Mcghee is a 58 year old female called for a follow-up visit from 5/24/2022.    Reason for visit: discuss medication dosage form conversion    Allergies/ADRs: Reviewed in  chart  Tobacco: She reports that she quit smoking about 31 years ago. Her smoking use included cigarettes. She started smoking about 38 years ago. She has a 3.00 pack-year smoking history. She does not have any smokeless tobacco history on file.  Alcohol: not currently using    Medication Adherence/Access:   -- would like to discuss alternate options for tube administration  -- update diphenhydramine    Gastroparesis/Constipation/Small Bowel Tube Feeding/GERD:     Dinora is most interested in discussing medication administration options today. She is currently taking liquid Senna, acetaminophen, and diphenhydramine. She is open to alternate dosage forms such as suppositories. She did start domperidone but had cardiac issues, so this was discontinued. Feels amitriptyline is helpful. Did re-start motegrity, which has been somewhat helpful. Did respond well to Amitiza, but this stopped working. As noted below, she has concerns that the medications just sit there and make her nauseous. She is getting lactated ringers as fluids.    Per previous message ("[employer]hart):    I'd like to get as many of my scripts as possible converted to liquid for the jtube as crushing them and putting into the gtube just sits in my stomach. It makes me nauseated. The pharmacist said I get the best absorption from the iv, then the j and finally the g.     The meds I crush most often are:   Amatryptiline   Motegrity   Robaxin   Flexeril   Levothryoxine   Thiamine   Singular   Zyrtec     S/p pancreas transplantation:  Insulin glargine 7 units nightly     Notes she is on insulin. Does have a glucagon kit at home. Notes she has had to use glucagon about three times in the last month.     Lab Results   Component Value Date    A1C 5.4 02/17/2022    A1C 5.2 11/27/2021    A1C 5.5 09/18/2021    A1C 5.4 08/05/2021      Pain Management:  Butrans 7.5 mcg/HR weekly patch  Baclofen 5-10 mg twice daily   Methocarbamol 750 mg four times daily as needed for  muscle spasms    Notes baclofen is more effective at 10 mg dose than the 5 mg dose. Notes she was supposed to get a spinal cord stimulator today, but had an infection and so they wouldn't do it. This seems to be a cycle that she is stuck in. Working with a pain management team. Notes her abdominal pain is significant.    ----------------  Post Discharge Medication Reconciliation Status: discharge medications reconciled and changed, per note/orders.    I spent 49 minutes with this patient today. All changes were made via collaborative practice agreement with Dr. Genao. A copy of the visit note was provided to the patient's provider(s).    A summary of these recommendations was sent via [employer].    Hattie VanceD, BCACP  MTM Pharmacist   [facility]   Phone: [phone]    Telemedicine Visit Details  Type of service:  Telephone visit  Start Time:  9:35 AM  End Time: 10:24 AM       Medication Therapy Recommendations  Gastroparesis    Current Medication: thiamine (B-1) 100 MG tablet   Rationale: Cannot swallow/administer medication - Adherence - Adherence   Recommendation: Change Medication   Status: Resolved Med Access Issue

## 2023-10-30 ENCOUNTER — TRANSFERRED RECORDS (OUTPATIENT)
Dept: HEALTH INFORMATION MANAGEMENT | Facility: CLINIC | Age: 58
End: 2023-10-30
Payer: COMMERCIAL

## 2023-11-04 RX ORDER — DIPHENHYDRAMINE HCL 12.5MG/5ML
25 LIQUID (ML) ORAL 4 TIMES DAILY PRN
COMMUNITY

## 2023-11-04 NOTE — PATIENT INSTRUCTIONS
"Recommendations from today's MTM visit:                                                      Hattie will follow-up after investigation of alternate dosing options for amitriptyline, baclofen, methocarbamol, montelukast, prochlorperazine, Motegrity and thiamine.    Follow-up:   -- Hattie will call after all medications are vetted    It was great speaking with you today.  I value your experience and would be very thankful for your time in providing feedback in our clinic survey. In the next few days, you may receive an email or text message from [employer] with a link to a survey related to your  clinical pharmacist.\"     To schedule another MTM appointment, please call the clinic directly or you may call the MTM scheduling line at [phone] or toll-free at [phone].     My Clinical Pharmacist's contact information:                                                      Please feel free to contact me with any questions or concerns you have.      Hattie VanceD, BCACP  MTM Pharmacist   [facility]   Phone: [phone]    "

## 2023-11-08 ENCOUNTER — VIRTUAL VISIT (OUTPATIENT)
Dept: INFECTIOUS DISEASES | Facility: CLINIC | Age: 58
End: 2023-11-08
Attending: REGISTERED NURSE
Payer: COMMERCIAL

## 2023-11-08 ENCOUNTER — APPOINTMENT (OUTPATIENT)
Dept: CT IMAGING | Facility: CLINIC | Age: 58
End: 2023-11-08
Attending: EMERGENCY MEDICINE
Payer: COMMERCIAL

## 2023-11-08 ENCOUNTER — HOSPITAL ENCOUNTER (INPATIENT)
Facility: CLINIC | Age: 58
LOS: 5 days | Discharge: HOME | End: 2023-11-15
Attending: EMERGENCY MEDICINE | Admitting: INTERNAL MEDICINE
Payer: COMMERCIAL

## 2023-11-08 ENCOUNTER — TELEPHONE (OUTPATIENT)
Dept: GASTROENTEROLOGY | Facility: CLINIC | Age: 58
End: 2023-11-08

## 2023-11-08 ENCOUNTER — TELEPHONE (OUTPATIENT)
Dept: INFECTIOUS DISEASES | Facility: CLINIC | Age: 58
End: 2023-11-08

## 2023-11-08 VITALS — BODY MASS INDEX: 19.01 KG/M2 | WEIGHT: 125 LBS

## 2023-11-08 DIAGNOSIS — R78.81 BACTEREMIA: Primary | ICD-10-CM

## 2023-11-08 DIAGNOSIS — R10.9 ABDOMINAL PAIN, UNSPECIFIED ABDOMINAL LOCATION: ICD-10-CM

## 2023-11-08 DIAGNOSIS — B96.1 BACTEREMIA DUE TO KLEBSIELLA PNEUMONIAE: ICD-10-CM

## 2023-11-08 DIAGNOSIS — Z87.19 HISTORY OF FOOD INTOLERANCE: ICD-10-CM

## 2023-11-08 DIAGNOSIS — Z79.2 ENCOUNTER FOR LONG-TERM (CURRENT) USE OF ANTIBIOTICS: Primary | ICD-10-CM

## 2023-11-08 DIAGNOSIS — Z95.828 PORT-A-CATH IN PLACE: ICD-10-CM

## 2023-11-08 DIAGNOSIS — R78.81 BACTEREMIA DUE TO KLEBSIELLA PNEUMONIAE: ICD-10-CM

## 2023-11-08 DIAGNOSIS — R11.2 NAUSEA AND VOMITING, UNSPECIFIED VOMITING TYPE: ICD-10-CM

## 2023-11-08 DIAGNOSIS — R10.84 ABDOMINAL PAIN, GENERALIZED: ICD-10-CM

## 2023-11-08 DIAGNOSIS — R62.7 ADULT FAILURE TO THRIVE: ICD-10-CM

## 2023-11-08 DIAGNOSIS — E46 MALNUTRITION, UNSPECIFIED TYPE (H): ICD-10-CM

## 2023-11-08 LAB
ALBUMIN SERPL BCG-MCNC: 4.3 G/DL (ref 3.5–5.2)
ALBUMIN UR-MCNC: NEGATIVE MG/DL
ALP SERPL-CCNC: 106 U/L (ref 35–104)
ALT SERPL W P-5'-P-CCNC: 45 U/L (ref 0–50)
ANION GAP SERPL CALCULATED.3IONS-SCNC: 10 MMOL/L (ref 7–15)
APPEARANCE UR: CLEAR
AST SERPL W P-5'-P-CCNC: 33 U/L (ref 0–45)
BASOPHILS # BLD AUTO: 0.1 10E3/UL (ref 0–0.2)
BASOPHILS NFR BLD AUTO: 1 %
BILIRUB SERPL-MCNC: 0.6 MG/DL
BILIRUB UR QL STRIP: NEGATIVE
BUN SERPL-MCNC: 14 MG/DL (ref 6–20)
CALCIUM SERPL-MCNC: 9.1 MG/DL (ref 8.6–10)
CHLORIDE SERPL-SCNC: 104 MMOL/L (ref 98–107)
COLOR UR AUTO: ABNORMAL
CREAT SERPL-MCNC: 0.51 MG/DL (ref 0.51–0.95)
DEPRECATED HCO3 PLAS-SCNC: 25 MMOL/L (ref 22–29)
EGFRCR SERPLBLD CKD-EPI 2021: >90 ML/MIN/1.73M2
EOSINOPHIL # BLD AUTO: 0.2 10E3/UL (ref 0–0.7)
EOSINOPHIL NFR BLD AUTO: 2 %
ERYTHROCYTE [DISTWIDTH] IN BLOOD BY AUTOMATED COUNT: 13.8 % (ref 10–15)
GLUCOSE SERPL-MCNC: 147 MG/DL (ref 70–99)
GLUCOSE UR STRIP-MCNC: NEGATIVE MG/DL
HCT VFR BLD AUTO: 36.9 % (ref 35–47)
HGB BLD-MCNC: 11.6 G/DL (ref 11.7–15.7)
HGB UR QL STRIP: NEGATIVE
IMM GRANULOCYTES # BLD: 0 10E3/UL
IMM GRANULOCYTES NFR BLD: 0 %
INR PPP: 1.11 (ref 0.85–1.15)
KETONES UR STRIP-MCNC: NEGATIVE MG/DL
LACTATE SERPL-SCNC: 0.6 MMOL/L (ref 0.7–2)
LEUKOCYTE ESTERASE UR QL STRIP: NEGATIVE
LYMPHOCYTES # BLD AUTO: 1.3 10E3/UL (ref 0.8–5.3)
LYMPHOCYTES NFR BLD AUTO: 14 %
MCH RBC QN AUTO: 30.5 PG (ref 26.5–33)
MCHC RBC AUTO-ENTMCNC: 31.4 G/DL (ref 31.5–36.5)
MCV RBC AUTO: 97 FL (ref 78–100)
MONOCYTES # BLD AUTO: 1.1 10E3/UL (ref 0–1.3)
MONOCYTES NFR BLD AUTO: 11 %
MUCOUS THREADS #/AREA URNS LPF: PRESENT /LPF
NEUTROPHILS # BLD AUTO: 7 10E3/UL (ref 1.6–8.3)
NEUTROPHILS NFR BLD AUTO: 72 %
NITRATE UR QL: NEGATIVE
NRBC # BLD AUTO: 0 10E3/UL
NRBC BLD AUTO-RTO: 0 /100
PH UR STRIP: 6 [PH] (ref 5–7)
PLATELET # BLD AUTO: 299 10E3/UL (ref 150–450)
POTASSIUM SERPL-SCNC: 4.1 MMOL/L (ref 3.4–5.3)
PROT SERPL-MCNC: 6.9 G/DL (ref 6.4–8.3)
RBC # BLD AUTO: 3.8 10E6/UL (ref 3.8–5.2)
RBC URINE: 1 /HPF
SODIUM SERPL-SCNC: 139 MMOL/L (ref 135–145)
SP GR UR STRIP: 1.01 (ref 1–1.03)
SQUAMOUS EPITHELIAL: <1 /HPF
UROBILINOGEN UR STRIP-MCNC: NORMAL MG/DL
WBC # BLD AUTO: 9.7 10E3/UL (ref 4–11)
WBC URINE: <1 /HPF

## 2023-11-08 PROCEDURE — 81001 URINALYSIS AUTO W/SCOPE: CPT | Performed by: EMERGENCY MEDICINE

## 2023-11-08 PROCEDURE — 84145 PROCALCITONIN (PCT): CPT

## 2023-11-08 PROCEDURE — 85610 PROTHROMBIN TIME: CPT | Performed by: EMERGENCY MEDICINE

## 2023-11-08 PROCEDURE — 99285 EMERGENCY DEPT VISIT HI MDM: CPT | Performed by: EMERGENCY MEDICINE

## 2023-11-08 PROCEDURE — 36415 COLL VENOUS BLD VENIPUNCTURE: CPT | Performed by: EMERGENCY MEDICINE

## 2023-11-08 PROCEDURE — 99285 EMERGENCY DEPT VISIT HI MDM: CPT | Mod: 25 | Performed by: EMERGENCY MEDICINE

## 2023-11-08 PROCEDURE — 74177 CT ABD & PELVIS W/CONTRAST: CPT

## 2023-11-08 PROCEDURE — 85025 COMPLETE CBC W/AUTO DIFF WBC: CPT | Performed by: EMERGENCY MEDICINE

## 2023-11-08 PROCEDURE — 258N000003 HC RX IP 258 OP 636: Performed by: EMERGENCY MEDICINE

## 2023-11-08 PROCEDURE — 96361 HYDRATE IV INFUSION ADD-ON: CPT | Performed by: EMERGENCY MEDICINE

## 2023-11-08 PROCEDURE — 87086 URINE CULTURE/COLONY COUNT: CPT | Performed by: EMERGENCY MEDICINE

## 2023-11-08 PROCEDURE — 74177 CT ABD & PELVIS W/CONTRAST: CPT | Mod: 26 | Performed by: RADIOLOGY

## 2023-11-08 PROCEDURE — 87149 DNA/RNA DIRECT PROBE: CPT | Performed by: EMERGENCY MEDICINE

## 2023-11-08 PROCEDURE — 99215 OFFICE O/P EST HI 40 MIN: CPT | Mod: VID | Performed by: REGISTERED NURSE

## 2023-11-08 PROCEDURE — 3E0436Z INTRODUCTION OF NUTRITIONAL SUBSTANCE INTO CENTRAL VEIN, PERCUTANEOUS APPROACH: ICD-10-PCS | Performed by: INTERNAL MEDICINE

## 2023-11-08 PROCEDURE — 250N000011 HC RX IP 250 OP 636: Performed by: EMERGENCY MEDICINE

## 2023-11-08 PROCEDURE — 83605 ASSAY OF LACTIC ACID: CPT | Performed by: EMERGENCY MEDICINE

## 2023-11-08 PROCEDURE — 999N000248 HC STATISTIC IV INSERT WITH US BY RN

## 2023-11-08 PROCEDURE — 87077 CULTURE AEROBIC IDENTIFY: CPT | Performed by: EMERGENCY MEDICINE

## 2023-11-08 PROCEDURE — 80053 COMPREHEN METABOLIC PANEL: CPT | Performed by: EMERGENCY MEDICINE

## 2023-11-08 PROCEDURE — 87086 URINE CULTURE/COLONY COUNT: CPT

## 2023-11-08 RX ORDER — IOPAMIDOL 755 MG/ML
77 INJECTION, SOLUTION INTRAVASCULAR ONCE
Status: COMPLETED | OUTPATIENT
Start: 2023-11-08 | End: 2023-11-08

## 2023-11-08 RX ORDER — HYDROMORPHONE HYDROCHLORIDE 1 MG/ML
0.5 INJECTION, SOLUTION INTRAMUSCULAR; INTRAVENOUS; SUBCUTANEOUS
Status: COMPLETED | OUTPATIENT
Start: 2023-11-08 | End: 2023-11-09

## 2023-11-08 RX ORDER — ACETAMINOPHEN 325 MG/10.15ML
650 LIQUID ORAL ONCE
Status: COMPLETED | OUTPATIENT
Start: 2023-11-08 | End: 2023-11-09

## 2023-11-08 RX ADMIN — SODIUM CHLORIDE, POTASSIUM CHLORIDE, SODIUM LACTATE AND CALCIUM CHLORIDE 1000 ML: 600; 310; 30; 20 INJECTION, SOLUTION INTRAVENOUS at 20:22

## 2023-11-08 RX ADMIN — IOPAMIDOL 77 ML: 755 INJECTION, SOLUTION INTRAVENOUS at 21:43

## 2023-11-08 ASSESSMENT — ACTIVITIES OF DAILY LIVING (ADL)
ADLS_ACUITY_SCORE: 37
ADLS_ACUITY_SCORE: 35

## 2023-11-08 ASSESSMENT — PAIN SCALES - GENERAL: PAINLEVEL: MILD PAIN (2)

## 2023-11-08 NOTE — TELEPHONE ENCOUNTER
Patient called in with symptoms:    Faint voice, tearful. Says she feels awful, as bad as she usually does when she's admitted.  Still being treated by ID for line infection-is on TPN holiday awaiting more line cultures.  Weight down to 125. Has vomited every day since Sunday.    Pain in lower left abdomen, per home nursing faint bowel sounds LLQ. Patient states LLQ pain intensity worse than before.  Wonders if adhesion need to be removed. We discussed overflow diarrhea.   States she gave herself an enama yesterday since she was feeling backed up- had BM. Had diarrhea today, pain still there.     Patient sounds miserable. Discussed that presenting to our ER is the safest plan for work up due to her complexity. Encouraged her to stay at the ER and not go to community hospital so we can best care for her.     Message sent to team.    DIEGO

## 2023-11-08 NOTE — LETTER
11/8/2023       RE: Dinora Mcghee  [address] [facility] [address]     Dear Colleague,    Thank you for referring your patient, Dinora Mcghee, to the [facility] [place] at [facility]. Please see a copy of my visit note below.    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    Problem List:  1. Klebsiella pneumoniae bacteremia  3/3 bottles (R: amp). First negative blood culture 10/11   2. Port-a-cath in place   3. TPN use  4. G-tube site  - redness, pain, bleeding, and purulent drainage  5. Acute on chronic left-sided abdominal pain - may be related to G-tube irritation vs infection          Recommendations:    Discussed consideration of repeating once more set of blood cultures this week to ensure device is not ongoing source of possible CLABSI; patient would like to check one more set  Ordering two sets of blood cultures - one should be drawn from Port and one peripherally; will fax orders today  If blood cultures negative, then follow up with ID PRN  If blood cultures positive, signs indicate port as likely source for re-seeding bloodstream and would  recommend removal of Port. Patient states she can't even fathom having her port removed  [facility] fax blood culture orders to clinic lab      I spent 46 minutes as part of a visit on the date of the encounter doing chart review, history and exam, documentation, and care coordination.      NATALY Kerns, CNP  Infectious Diseases  Pager# [phone] or Vocera (preferred)          Interval History:     Patient states she developed fevers and rigors one day prior to heading into [place] ED on 10/9.    [place] admit 10/9-10/13 - Possible CLABSI K pneumoniae (R: amp) w/sepsis +shock; b cx + 10/9, clear 10/11; fungal blood cultures negative    IV ceftri last dose 10/10- 10/24; abx lock therapy to save device    Blood cultures check on 10/26 with no growth; Dr. Andrade    Currently today 11/8/2023  No fevers or chills. No burning with urination, urinary urgency, frequency. No chest pain or cough, shortness of breath.    Persistent headache, feeling foggy, feeling nauseous, tension ache (tightness in neck and shower). Patient states these are chronic symptoms. Antiemetic not helping today    Persistent issues with constipation. Had to use enema yesterday, and had ribbon of loose stool. Today she had diarrhea without any stool aids. History of bowel obstructions and states she feels like this is similar. Says she is planning on reaching out to Dr. Park after we speak about her concerns. Just seen by Dr. Park on 10/24.    Notes she has to change G-tube dressing 3x/day.     Same organism as 6/4/23;       6/23/23  Low grade fevers and bodyaches and nightsweats after discharge home but these have resolved. Had diarrhea but this resolved after finishing antibiotics. Overall feeling better    Still having significant pain around G-tube site, with drainage around insertion site (green or brown) increased about a week ago, over the last week having to change in the middle of the day due to increased drainage. Small  "bleeding from granulation tissue surrounding the G-tube, granulation tissue has started growing over the last week and now popping out of her insertion. Sharp stabbing intermittent, but then leaves a dull ache through to back. Worse with moving positions and from sitting to standing. Ice and heat help. A little bit worse since hospitalization, more bloating with attempting to eat more foods.    Line: PICC or Port? Well-healed, no redness, pain or drainage    Denies headache, fever, chills, night sweats, fatigue, sore throat, cough, dyspnea, nausea, vomiting, abdominal pain, diarrhea, dysuria, myalgias, arthralgias, lymphadenopathy, acute rash, or new wounds.        History of Present Illness:     See Vicenta Stubbs PA-C's consult note for full HPI: 6/6/23            Review of Systems:     12-point ROS performed; pertinent positives and negatives per above         Medications & Allergies:     Current Outpatient Medications   Medication    acetaminophen (TYLENOL) 325 MG/10.15ML solution    amitriptyline (ELAVIL) 10 MG tablet    amylase-lipase-protease (CREON) 56015-17002 units CPEP per EC capsule    Baclofen (LIORESAL) 5 MG tablet    blood glucose (PAT CONTOUR NEXT) test strip    blood glucose (NO BRAND SPECIFIED) test strip    blood glucose monitoring (PAT MICROLET) lancets    Buprenorphine HCl (BELBUCA) 150 MCG FILM buccal film    cetirizine (ZYRTEC) 10 MG tablet    Continuous Blood Gluc Sensor (FREESTYLE LISA 3 SENSOR) MISC    Digestive Enzyme Cartridge (RELIZORB) MARCO    diphenhydrAMINE (BENADRYL) 12.5 MG/5ML solution    estradiol (VAGIFEM) 10 MCG TABS vaginal tablet    famotidine (PEPCID) 40 MG/5ML suspension    fluticasone (FLONASE) 50 MCG/ACT nasal spray    glucagon 1 MG kit    glucose 40 % GEL gel    Insulin Glargine-yfgn (SEMGLEE, YFGN,) 100 UNIT/ML SOPN    insulin syringe-needle U-100 (30G X 1/2\" 0.3 ML) 30G X 1/2\" 0.3 ML miscellaneous    levothyroxine (SYNTHROID/LEVOTHROID) 137 MCG tablet    lidocaine " (LIDODERM) 5 % patch    methocarbamol (ROBAXIN) 750 MG tablet    montelukast (SINGULAIR) 10 MG tablet    Nutritional Supplements (BOOST HIGH PROTEIN) LIQD    pantoprazole (PROTONIX) 40 mg IV push injection    prochlorperazine (COMPAZINE) 10 MG tablet    promethazine 25 mg    prucalopride succinate (MOTEGRITY) 2 MG tablet    scopolamine (TRANSDERM) 1 MG/3DAYS 72 hr patch    sennosides (SENOKOT) 8.8 MG/5ML syrup    Sharps Container (BD SHARPS ) MISC    silver nitrate (ARZOL SILVER NIT APPLICATORS) 75-25 % miscellaneous    STATIN NOT PRESCRIBED (INTENTIONAL)    thiamine (B-1) 100 MG tablet    UNABLE TO FIND    zinc oxide - white petrolatum (CRITIC-AID THICK MOIST BARRIER) 20-51% PSTE topical paste    ZOLMitriptan (ZOMIG-ZMT) 5 MG ODT     Current Facility-Administered Medications   Medication    alteplase (CATHFLO ACTIVASE) injection 2 mg    Botulinum Toxin Type A (BOTOX) 200 units injection 155 Units         Allergies   Allergen Reactions    Apple Juice Anaphylaxis    Corticosteroids Other (See Comments)     All oral, IV and injectable steroids are contraindicated (unless in life threatening situations) in Islet Auto transplant recipients. They can cause irreversible loss of islet cell function. Please contact patient's transplant care coordinator ADI Gaffney RN at [phone]/pager [phone] and/or endocrinologist prior to administration.      Depakote [Divalproex Sodium] Other (See Comments)     Chest pain    Zithromax [Azithromycin Dihydrate] Anaphylaxis     Noted in 4/7/08 ER    Bromfenac      Other reaction(s): Headache    Codeine      Other reaction(s): Hallucinations    Darvocet [Propoxyphene N-Apap] Itching    Morphine Nausea and Vomiting and Rash    Nalbuphine Hcl Rash     RASH :nubaine    Zosyn [Piperacillin-Tazobactam In Dex] Rash     Possible allergy, did have a diffuse rash that seemed drug related but could have also been related to soap in the hospital.     Bactrim [Sulfamethoxazole  "W-Trimethoprim] Other (See Comments) and Nausea and Vomiting     Severely low liver function.    Statins Other (See Comments)     Previous liver issues, risks vs benefits felt to not warrant statin.  Discussed Oct 2022 visit    Tape [Adhesive Tape] Blisters    Tramadol     Zofran [Ondansetron] Other (See Comments)     migraine    Flagyl [Metronidazole] Hives and Rash            Physical Exam:     There were no vitals taken for this visit.\"  There were no vitals filed for this visit.      Physical Examination:  GENERAL: Healthy, alert and no distress. Tearful   EYES: Eyes grossly normal to inspection.  No discharge or erythema, or obvious scleral/conjunctival abnormalities.  RESP: No audible wheeze, cough, or visible cyanosis.  No visible retractions or increased work of breathing.    SKIN: Visible skin clear. No significant rash, abnormal pigmentation or lesions.  NEURO: Cranial nerves grossly intact.  Mentation and speech appropriate for age.  PSYCH: Mentation appears normal, affect normal/bright, judgement and insight intact, normal speech and appearance well-groomed.      Microbiology Data   Pertinent Micro:    Culture   Date Value Ref Range Status   07/05/2023 No Growth  Final   06/27/2023 No Growth  Final   06/27/2023 No Growth  Final   06/23/2023 1+ Klebsiella pneumoniae (A)  Final   06/23/2023 1+ Pseudomonas aeruginosa (A)  Final   06/23/2023 1+ Candida albicans (A)  Final     Comment:     Susceptibilities not routinely done, refer to antibiogram to view typical susceptibility profiles   06/23/2023 1+ Staphylococcus epidermidis (A)  Final     Comment:     Susceptibilities not routinely done, refer to antibiogram to view typical susceptibility profiles   06/06/2023 No Growth  Final   06/06/2023 No Growth  Final   06/06/2023 No Growth  Final   06/05/2023 No Growth  Final   06/05/2023 No Growth  Final   06/04/2023 Positive on the 1st day of incubation (A)  Final   06/04/2023 Klebsiella pneumoniae (AA)  Final     " "Comment:     2 of 2 bottlesSusceptibilities done on previous cultures   06/04/2023 Positive on the 1st day of incubation (A)  Final   06/04/2023 Klebsiella pneumoniae (AA)  Final     Comment:     2 of 2 bottles   05/10/2023 No Growth  Final   05/04/2023 Candida albicans (A)  Final   12/05/2021 2+ Klebsiella pneumoniae (A)  Final   12/05/2021 2+ Klebsiella pneumoniae (A)  Final   12/05/2021 2+ Citrobacter freundii complex (A)  Final   12/05/2021 3+ Streptococcus anginosus (A)  Final     Comment:     This organism is susceptible to ampicillin, penicillin, vancomycin and the cephalosporins. If treatment is required and your patient is allergic to penicillin, contact the microbiology lab within 5 days to request susceptibility testing.       Last check of C difficile  C Diff Toxin B PCR   Date Value Ref Range Status   08/08/2016  NEG Final    Negative  Negative: Clostridium difficile target DNA sequences NOT detected, presumed   negative for Clostridium difficile toxin B or the number of bacteria present   may be below the limit of detection for the test.   FDA approved assay performed using [employer] GeneIkerChempert real-time PCR.   A negative result does not exclude actual disease due to Clostridium difficile   and may be due to improper collection, handling and storage of the specimen or   the number of organisms in the specimen is below the detection limit of the   assay.         Urine Studies     Recent Labs   Lab Test 06/04/23  1624 05/10/23  1812 12/19/22  0700 12/03/21  1412 09/19/21  1808 09/08/21  0314   URINEPH 6.0 7.0 6.0 7.0 5.0 5.5   NITRITE Negative Negative Negative Negative Negative Negative   LEUKEST Negative Large* Negative Negative Moderate* Trace*   WBCU 1 28*  --  <1 10* 3       CSF testing   No lab results found.    Invalid input(s): \"CADAM\", \"EVPCR\", \"ENTPCR\", \"ENTEROVIRUS\"    Laboratory Data:   Metabolic Studies       Recent Labs   Lab Test 08/01/23  0743 06/10/23  0706 06/09/23  0551 06/09/23  0445 " 06/05/23  0703 06/05/23  0702 06/04/23  2231 06/04/23  1553 06/04/23  1348 06/04/23  1343 02/17/22  1244 02/17/22  1115    138  --  139   < >  --   --   --    < >  --    < > 138   POTASSIUM 4.6 4.3  --  4.1   < >  --   --   --    < >  --    < > 4.0   CHLORIDE 102 103  --  103   < >  --   --   --    < >  --    < > 102   CO2 27 27  --  26   < >  --   --   --    < >  --    < > 29   ANIONGAP 10 8  --  10   < >  --   --   --    < >  --    < > 7   BUN 11.5 9.1  --  11.7   < >  --   --   --    < >  --    < > 15   CR 0.55 0.54  --  0.51   < >  --   --   --    < >  --    < > 0.71   GFRESTIMATED >90 >90  --  >90   < >  --   --   --    < >  --    < > >90   * 130*   < > 100*   < >  --    < >  --    < >  --    < > 132*  132*   A1C  --   --   --   --   --   --   --   --   --   --   --  5.4   KASSI 9.4 8.4*  --  8.8   < >  --   --   --    < >  --    < > 9.6   PHOS  --  3.3  --  3.5   < > 2.2*  --   --    < >  --    < >  --    MAG  --  2.0  --  1.9   < >  --    < >  --    < >  --    < >  --    LACT  --   --   --   --   --   --   --  1.3  --  2.7*   < >  --    PCAL  --   --   --  0.49*   < >  --   --   --    < >  --   --   --    FGTL  --   --   --   --   --  <31  --   --   --   --   --   --     < > = values in this interval not displayed.       Hepatic Studies    Recent Labs   Lab Test 06/10/23  0706 06/09/23  0445 06/08/23  0706 06/04/23  1348 05/10/23  0719   BILITOTAL 0.4 0.4 0.4   < > 0.5   DBIL  --   --   --   --  <0.20   ALKPHOS 162* 154* 174*   < > 125*   PROTTOTAL 5.9* 5.8* 6.2*   < > 7.4   ALBUMIN 3.5 3.3* 3.5   < > 4.3   AST 40* 31 30   < > 43*   ALT 99* 101* 133*   < > 67*    < > = values in this interval not displayed.       Hematology Studies      Recent Labs   Lab Test 08/01/23  0743 06/10/23  0706 06/09/23  0445 06/08/23  0706 06/07/23  0707 06/06/23  0549 07/13/21  1731 12/17/20  0739 09/14/20  1529   WBC 5.7 8.1 6.5 5.5 4.8 8.8   < > 5.0 6.2   ANEU  --   --   --   --   --   --   --  1.5* 2.6   ALYM  --    "--   --   --   --   --   --  2.7 2.6   GABRIELE  --   --   --   --   --   --   --  0.5 0.7   AEOS  --   --   --   --   --   --   --  0.3 0.2   HGB 12.5 11.5* 11.0* 11.7 11.6* 11.1*   < > 13.5 14.3   HCT 39.0 34.9* 34.0* 36.7 36.6 34.7*   < > 41.4 42.9    356 302 277 228 184   < > 363 353    < > = values in this interval not displayed.       Medication levels  No lab results found.    Invalid input(s): \"AMIK\"    Inflammatory Markers    Recent Labs   Lab Test 09/16/21  1308 12/29/16  0620   SED 10  --    CRP  --  90.0*       Imaging:  Results for orders placed or performed during the hospital encounter of 06/04/23   XR Chest Port 1 View    Narrative    EXAM: XR CHEST PORT 1 VIEW  6/4/2023 5:33 PM     HISTORY:  sepsis, assessing for source       COMPARISON:  9/23/2021    FINDINGS:   AP portable semiupright view of the chest. Right chest wall cydney cath  distal tip in the high SVC. Trachea is midline. Cardiomediastinal  silhouette and pulmonary vasculature are within normal limits. No  focal airspace opacity, pleural effusion or appreciable pneumothorax.    No acute osseous abnormality. Visualized upper abdomen is  unremarkable.        Impression    IMPRESSION:   No acute airspace disease.    I have personally reviewed the examination and initial interpretation  and I agree with the findings.    BOB RIVERA MD         SYSTEM ID:  [id number]   US Abdomen Limited    Addendum: 6/7/2023    Previous final report notes visualized portions of the pancreatic head  and body are within normal limits. Erroneous finding as the patient  has previously undergone partial pancreatectomy, echogenic material  visualized is likely fat within the lesser sac.    I have personally reviewed the examination and initial interpretation  and I agree with the findings.    CARL MAR MD         SYSTEM ID:  [id number]      Narrative    EXAMINATION: Limited Abdominal Ultrasound, 6/5/2023 10:32 AM     COMPARISON: 5/9/2023, 1/12/2023    HISTORY: " Focus to hepatobiliary/RUQ; sepsis      FINDINGS:   Fluid: Equivocal trace perihepatic ascites.  No pleural effusion.    Liver: The liver demonstrates diffusely echogenic and heterogeneous  parenchyma, and is enlarged measuring 18.9 cm in craniocaudal  dimension. There is no focal mass. Patent main portal vein with flow  directed towards liver.    Gallbladder: Cholecystectomy. Normal caliber and intrahepatic biliary  system.    Bile Ducts: The common bile duct measures approximately 2 mm in  diameter, although difficult to visualize with ultrasound.    Pancreas: Visualized portions of the head and body of the pancreas are  unremarkable.     Kidney: The right kidney measures 12.2 cm long. There is no  hydronephrosis, mass lesion or obstructing calculus. Right kidney  cortex is diffusely prominent and echogenic.      Impression    IMPRESSION:   1.  Sonographic evidence of intrinsic hepatic parenchymal disease  including hepatic steatosis.  No focal liver lesion. Equivocal trace  perihepatic ascites.     2.  Echogenic right kidney compatible with medical renal disease.  3. Cholecystectomy.  Common bile duct measures approximately 2 mm in  diameter, although difficult to visualize with ultrasound.       I have personally reviewed the examination and initial interpretation  and I agree with the findings.    CATALINA PAYNE MD         SYSTEM ID:  [id number]   MR Abdomen MRCP w/o & w Contrast    Narrative    MRI ABDOMEN    CLINICAL HISTORY:  History of biliary obstruction status post  cholecystectomy, ERCP/stent, admitted with sepsis, exclude cholangitis    TECHNIQUE:  Images were acquired with and without intravenous contrast  through the abdomen. The following MR images were acquired: TrueFISP,  multiplanar T2 weighted, axial T1 in/out of phase, axial fat-saturated  T1, diffusion-weighted. Multiplanar T1-weighted images with fat  saturation were before contrast administration and at multiple time  points following the  administration of intravenous contrast. Contrast  dose: 6 mL gadavist    FINDINGS:    Comparison study: CT 5/19/2023, 1/12/2023    Liver: Heterogeneous enhancement of the liver on the arterial phase  with mild periportal edema. No focal hepatic mass. Homogenous  enhancement of the liver parenchyma on the portal venous and delayed  phases. No hepatic steatosis or evidence of iron overload. The common  bile duct and intrahepatic biliary system do not appear dilated.  Somewhat narrowed appearance of the common bile duct near the  bifurcation without obvious obstructing mass. Postcontrast, the  biliary tree does not appear significantly hyperemic and no definite  thickening of the common bile duct is appreciated.     Gallbladder: Cholecystectomy.    Spleen: Splenectomy.    Kidneys: No focal mass. No hydronephrosis.    Adrenal glands: Unremarkable.    Pancreas: Pancreatectomy.    Bowel: Percutaneous gastrostomy and jejunostomy tubes. No obstruction.    Lymph nodes: Unremarkable.    Blood vessels: The aorta and portal vein are patent.    Lung bases: Small bilateral pleural effusions.    Bones and soft tissues: Hemangioma suspected in the L2 vertebral body  on the right, series 4 image 17, stable from prior CT.    Mesentery and abdominal wall: Minor body wall edema.    Ascites: Small volume ascites.      Impression    IMPRESSION:  1. The common bile duct appears somewhat narrowed near the  bifurcation, however there is no upstream intrahepatic biliary  dilatation to suggest obstruction. No definite findings to suggest  acute cholangitis.  2. Heterogeneous enhancement of the liver on arterial phase sequence  with associated periportal edema. Findings are nonspecific but can be  seen with acute hepatitis. Alternatively, periportal edema may be due  to overall third spacing of fluid with ascites and some body wall  edema also noted. Correlation with liver function tests.  3. Small volume ascites.    I have personally reviewed  the examination and initial interpretation  and I agree with the findings.    MACRINA CORRIGAN MD         SYSTEM ID:  [id number]     *Note: Due to a large number of results and/or encounters for the requested time period, some results have not been displayed. A complete set of results can be found in Results Review.     Virtual Visit Details    Type of service:  Video Visit     Originating Location (pt. Location): Home    Distant Location (provider location):  On-site  Platform used for Video Visit: NATALY Jordan CNP

## 2023-11-08 NOTE — PROGRESS NOTES
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    Problem List:  1. Klebsiella pneumoniae bacteremia  3/3 bottles (R: amp). First negative blood culture 10/11   2. Port-a-cath in place   3. TPN use  4. G-tube site  - redness, pain, bleeding, and purulent drainage  5. Acute on chronic left-sided abdominal pain - may be related to G-tube irritation vs infection          Recommendations:    Discussed consideration of repeating once more set of blood cultures this week to ensure device is not ongoing source of possible CLABSI; patient would like to check one more set  Ordering two sets of blood cultures - one should be drawn from Port and one peripherally; will fax orders today  If blood cultures negative, then follow up with ID PRN  If blood cultures positive, signs indicate port as likely source for re-seeding bloodstream and would recommend removal of Port. Patient states she can't even fathom having her port removed  [facility] fax blood culture orders to clinic lab      I spent 46 minutes as part of a visit on the date of the encounter doing chart review, history and exam, documentation, and care coordination.      Izabel Becker,  APRN, CNP  Infectious Diseases  Pager# [phone] or Pallavi (preferred)          Interval History:     Patient states she developed fevers and rigors one day prior to heading into [facility] ED on 10/9.    [facility] admit 10/9-10/13 - Possible CLABSI K pneumoniae (R: amp) w/sepsis +shock; b cx + 10/9, clear 10/11; fungal blood cultures negative    IV ceftri last dose 10/10- 10/24; abx lock therapy to save device    Blood cultures check on 10/26 with no growth; Dr. Andrade    Currently today 11/8/2023  No fevers or chills. No burning with urination, urinary urgency, frequency. No chest pain or cough, shortness of breath.    Persistent headache, feeling foggy, feeling nauseous, tension ache (tightness in neck and shower). Patient states these are chronic symptoms. Antiemetic not helping today    Persistent issues with constipation. Had to use enema yesterday, and had ribbon of loose stool. Today she had diarrhea without any stool aids. History of bowel obstructions and states she feels like this is similar. Says she is planning on reaching out to Dr. Park after we speak about her concerns. Just seen by Dr. Park on 10/24.    Notes she has to change G-tube dressing 3x/day.     Same organism as 6/4/23;       6/23/23  Low grade fevers and bodyaches and nightsweats after discharge home but these have resolved. Had diarrhea but this resolved after finishing antibiotics. Overall feeling better    Still having significant pain around G-tube site, with drainage around insertion site (green or brown) increased about a week ago, over the last week having to change in the middle of the day due to increased drainage. Small bleeding from granulation tissue surrounding the G-tube, granulation tissue has started growing over the last week and now popping out of her insertion. Sharp stabbing intermittent, but then leaves a dull ache through to back. Worse with moving positions and from sitting to standing. Ice and heat help. A little bit worse  "since hospitalization, more bloating with attempting to eat more foods.    Line: PICC or Port? Well-healed, no redness, pain or drainage    Denies headache, fever, chills, night sweats, fatigue, sore throat, cough, dyspnea, nausea, vomiting, abdominal pain, diarrhea, dysuria, myalgias, arthralgias, lymphadenopathy, acute rash, or new wounds.        History of Present Illness:     See Vicenta Stubbs PA-C's consult note for full HPI: 6/6/23            Review of Systems:     12-point ROS performed; pertinent positives and negatives per above         Medications & Allergies:     Current Outpatient Medications   Medication    acetaminophen (TYLENOL) 325 MG/10.15ML solution    amitriptyline (ELAVIL) 10 MG tablet    amylase-lipase-protease (CREON) 47979-82367 units CPEP per EC capsule    Baclofen (LIORESAL) 5 MG tablet    blood glucose (PAT CONTOUR NEXT) test strip    blood glucose (NO BRAND SPECIFIED) test strip    blood glucose monitoring (PAT MICROLET) lancets    Buprenorphine HCl (BELBUCA) 150 MCG FILM buccal film    cetirizine (ZYRTEC) 10 MG tablet    Continuous Blood Gluc Sensor (FREESTYLE LISA 3 SENSOR) [facility] [place], [place]    Digestive Enzyme Cartridge (RELIZORB) MARCO    diphenhydrAMINE (BENADRYL) 12.5 MG/5ML solution    estradiol (VAGIFEM) 10 MCG TABS vaginal tablet    famotidine (PEPCID) 40 MG/5ML suspension    fluticasone (FLONASE) 50 MCG/ACT nasal spray    glucagon 1 MG kit    glucose 40 % GEL gel    Insulin Glargine-yfgn (SEMGLEE, YFGN,) 100 UNIT/ML SOPN    insulin syringe-needle U-100 (30G X 1/2\" 0.3 ML) 30G X 1/2\" 0.3 ML miscellaneous    levothyroxine (SYNTHROID/LEVOTHROID) 137 MCG tablet    lidocaine (LIDODERM) 5 % patch    methocarbamol (ROBAXIN) 750 MG tablet    montelukast (SINGULAIR) 10 MG tablet    Nutritional Supplements (BOOST HIGH PROTEIN) LIQD    pantoprazole (PROTONIX) 40 mg IV push injection    prochlorperazine (COMPAZINE) 10 MG tablet    promethazine 25 mg    prucalopride succinate (MOTEGRITY) 2 MG tablet    " scopolamine (TRANSDERM) 1 MG/3DAYS 72 hr patch    sennosides (SENOKOT) 8.8 MG/5ML syrup    Sharps Container (BD SHARPS ) MISC    silver nitrate (ARZOL SILVER NIT APPLICATORS) 75-25 % miscellaneous    STATIN NOT PRESCRIBED (INTENTIONAL)    thiamine (B-1) 100 MG tablet    UNABLE TO FIND    zinc oxide - white petrolatum (CRITIC-AID THICK MOIST BARRIER) 20-51% PSTE topical paste    ZOLMitriptan (ZOMIG-ZMT) 5 MG ODT     Current Facility-Administered Medications   Medication    alteplase (CATHFLO ACTIVASE) injection 2 mg    Botulinum Toxin Type A (BOTOX) 200 units injection 155 Units         Allergies   Allergen Reactions    Apple Juice Anaphylaxis    Corticosteroids Other (See Comments)     All oral, IV and injectable steroids are contraindicated (unless in life threatening situations) in Islet Auto transplant recipients. They can cause irreversible loss of islet cell function. Please contact patient's transplant care coordinator ADI Gaffney RN at [phone]/pager [phone] and/or endocrinologist prior to administration.      Depakote [Divalproex Sodium] Other (See Comments)     Chest pain    Zithromax [Azithromycin Dihydrate] Anaphylaxis     Noted in 4/7/08 ER    Bromfenac      Other reaction(s): Headache    Codeine      Other reaction(s): Hallucinations    Darvocet [Propoxyphene N-Apap] Itching    Morphine Nausea and Vomiting and Rash    Nalbuphine Hcl Rash     RASH :nubaine    Zosyn [Piperacillin-Tazobactam In Dex] Rash     Possible allergy, did have a diffuse rash that seemed drug related but could have also been related to soap in the hospital.     Bactrim [Sulfamethoxazole W-Trimethoprim] Other (See Comments) and Nausea and Vomiting     Severely low liver function.    Statins Other (See Comments)     Previous liver issues, risks vs benefits felt to not warrant statin.  Discussed Oct 2022 visit    Tape [Adhesive Tape] Blisters    Tramadol     Zofran [Ondansetron] Other (See Comments)     migraine     "Flagyl [Metronidazole] Hives and Rash            Physical Exam:     There were no vitals taken for this visit.\"  There were no vitals filed for this visit.      Physical Examination:  GENERAL: Healthy, alert and no distress. Tearful   EYES: Eyes grossly normal to inspection.  No discharge or erythema, or obvious scleral/conjunctival abnormalities.  RESP: No audible wheeze, cough, or visible cyanosis.  No visible retractions or increased work of breathing.    SKIN: Visible skin clear. No significant rash, abnormal pigmentation or lesions.  NEURO: Cranial nerves grossly intact.  Mentation and speech appropriate for age.  PSYCH: Mentation appears normal, affect normal/bright, judgement and insight intact, normal speech and appearance well-groomed.      Microbiology Data   Pertinent Micro:    Culture   Date Value Ref Range Status   07/05/2023 No Growth  Final   06/27/2023 No Growth  Final   06/27/2023 No Growth  Final   06/23/2023 1+ Klebsiella pneumoniae (A)  Final   06/23/2023 1+ Pseudomonas aeruginosa (A)  Final   06/23/2023 1+ Candida albicans (A)  Final     Comment:     Susceptibilities not routinely done, refer to antibiogram to view typical susceptibility profiles   06/23/2023 1+ Staphylococcus epidermidis (A)  Final     Comment:     Susceptibilities not routinely done, refer to antibiogram to view typical susceptibility profiles   06/06/2023 No Growth  Final   06/06/2023 No Growth  Final   06/06/2023 No Growth  Final   06/05/2023 No Growth  Final   06/05/2023 No Growth  Final   06/04/2023 Positive on the 1st day of incubation (A)  Final   06/04/2023 Klebsiella pneumoniae (AA)  Final     Comment:     2 of 2 bottlesSusceptibilities done on previous cultures   06/04/2023 Positive on the 1st day of incubation (A)  Final   06/04/2023 Klebsiella pneumoniae (AA)  Final     Comment:     2 of 2 bottles   05/10/2023 No Growth  Final   05/04/2023 Candida albicans (A)  Final   12/05/2021 2+ Klebsiella pneumoniae (A)  Final " "  12/05/2021 2+ Klebsiella pneumoniae (A)  Final   12/05/2021 2+ Citrobacter freundii complex (A)  Final   12/05/2021 3+ Streptococcus anginosus (A)  Final     Comment:     This organism is susceptible to ampicillin, penicillin, vancomycin and the cephalosporins. If treatment is required and your patient is allergic to penicillin, contact the microbiology lab within 5 days to request susceptibility testing.       Last check of C difficile  C Diff Toxin B PCR   Date Value Ref Range Status   08/08/2016  NEG Final    Negative  Negative: Clostridium difficile target DNA sequences NOT detected, presumed   negative for Clostridium difficile toxin B or the number of bacteria present   may be below the limit of detection for the test.   FDA approved assay performed using [employer] real-time PCR.   A negative result does not exclude actual disease due to Clostridium difficile   and may be due to improper collection, handling and storage of the specimen or   the number of organisms in the specimen is below the detection limit of the   assay.         Urine Studies     Recent Labs   Lab Test 06/04/23  1624 05/10/23  1812 12/19/22  0700 12/03/21  1412 09/19/21  1808 09/08/21  0314   URINEPH 6.0 7.0 6.0 7.0 5.0 5.5   NITRITE Negative Negative Negative Negative Negative Negative   LEUKEST Negative Large* Negative Negative Moderate* Trace*   WBCU 1 28*  --  <1 10* 3       CSF testing   No lab results found.    Invalid input(s): \"CADAM\", \"EVPCR\", \"ENTPCR\", \"ENTEROVIRUS\"    Laboratory Data:   Metabolic Studies       Recent Labs   Lab Test 08/01/23  0743 06/10/23  0706 06/09/23  0551 06/09/23  0445 06/05/23  0703 06/05/23  0702 06/04/23  2231 06/04/23  1553 06/04/23  1348 06/04/23  1343 02/17/22  1244 02/17/22  1115    138  --  139   < >  --   --   --    < >  --    < > 138   POTASSIUM 4.6 4.3  --  4.1   < >  --   --   --    < >  --    < > 4.0   CHLORIDE 102 103  --  103   < >  --   --   --    < >  --    < > 102   CO2 27 " 27  --  26   < >  --   --   --    < >  --    < > 29   ANIONGAP 10 8  --  10   < >  --   --   --    < >  --    < > 7   BUN 11.5 9.1  --  11.7   < >  --   --   --    < >  --    < > 15   CR 0.55 0.54  --  0.51   < >  --   --   --    < >  --    < > 0.71   GFRESTIMATED >90 >90  --  >90   < >  --   --   --    < >  --    < > >90   * 130*   < > 100*   < >  --    < >  --    < >  --    < > 132*  132*   A1C  --   --   --   --   --   --   --   --   --   --   --  5.4   KASSI 9.4 8.4*  --  8.8   < >  --   --   --    < >  --    < > 9.6   PHOS  --  3.3  --  3.5   < > 2.2*  --   --    < >  --    < >  --    MAG  --  2.0  --  1.9   < >  --    < >  --    < >  --    < >  --    LACT  --   --   --   --   --   --   --  1.3  --  2.7*   < >  --    PCAL  --   --   --  0.49*   < >  --   --   --    < >  --   --   --    FGTL  --   --   --   --   --  <31  --   --   --   --   --   --     < > = values in this interval not displayed.       Hepatic Studies    Recent Labs   Lab Test 06/10/23  0706 06/09/23  0445 06/08/23  0706 06/04/23  1348 05/10/23  0719   BILITOTAL 0.4 0.4 0.4   < > 0.5   DBIL  --   --   --   --  <0.20   ALKPHOS 162* 154* 174*   < > 125*   PROTTOTAL 5.9* 5.8* 6.2*   < > 7.4   ALBUMIN 3.5 3.3* 3.5   < > 4.3   AST 40* 31 30   < > 43*   ALT 99* 101* 133*   < > 67*    < > = values in this interval not displayed.       Hematology Studies      Recent Labs   Lab Test 08/01/23  0743 06/10/23  0706 06/09/23  0445 06/08/23  0706 06/07/23  0707 06/06/23  0549 07/13/21  1731 12/17/20  0739 09/14/20  1529   WBC 5.7 8.1 6.5 5.5 4.8 8.8   < > 5.0 6.2   ANEU  --   --   --   --   --   --   --  1.5* 2.6   ALYM  --   --   --   --   --   --   --  2.7 2.6   GABRIELE  --   --   --   --   --   --   --  0.5 0.7   AEOS  --   --   --   --   --   --   --  0.3 0.2   HGB 12.5 11.5* 11.0* 11.7 11.6* 11.1*   < > 13.5 14.3   HCT 39.0 34.9* 34.0* 36.7 36.6 34.7*   < > 41.4 42.9    356 302 277 228 184   < > 363 353    < > = values in this interval not  "displayed.       Medication levels  No lab results found.    Invalid input(s): \"AMIK\"    Inflammatory Markers    Recent Labs   Lab Test 09/16/21  1308 12/29/16  0620   SED 10  --    CRP  --  90.0*       Imaging:  Results for orders placed or performed during the hospital encounter of 06/04/23   XR Chest Port 1 View    Narrative    EXAM: XR CHEST PORT 1 VIEW  6/4/2023 5:33 PM     HISTORY:  sepsis, assessing for source       COMPARISON:  9/23/2021    FINDINGS:   AP portable semiupright view of the chest. Right chest wall cydney cath  distal tip in the high SVC. Trachea is midline. Cardiomediastinal  silhouette and pulmonary vasculature are within normal limits. No  focal airspace opacity, pleural effusion or appreciable pneumothorax.    No acute osseous abnormality. Visualized upper abdomen is  unremarkable.        Impression    IMPRESSION:   No acute airspace disease.    I have personally reviewed the examination and initial interpretation  and I agree with the findings.    BOB RIVERA MD         SYSTEM ID:  [id number]   US Abdomen Limited    Addendum: 6/7/2023    Previous final report notes visualized portions of the pancreatic head  and body are within normal limits. Erroneous finding as the patient  has previously undergone partial pancreatectomy, echogenic material  visualized is likely fat within the lesser sac.    I have personally reviewed the examination and initial interpretation  and I agree with the findings.    CARL MAR MD         SYSTEM ID:  [id number]      Narrative    EXAMINATION: Limited Abdominal Ultrasound, 6/5/2023 10:32 AM     COMPARISON: 5/9/2023, 1/12/2023    HISTORY: Focus to hepatobiliary/RUQ; sepsis      FINDINGS:   Fluid: Equivocal trace perihepatic ascites.  No pleural effusion.    Liver: The liver demonstrates diffusely echogenic and heterogeneous  parenchyma, and is enlarged measuring 18.9 cm in craniocaudal  dimension. There is no focal mass. Patent main portal vein with " flow  directed towards liver.    Gallbladder: Cholecystectomy. Normal caliber and intrahepatic biliary  system.    Bile Ducts: The common bile duct measures approximately 2 mm in  diameter, although difficult to visualize with ultrasound.    Pancreas: Visualized portions of the head and body of the pancreas are  unremarkable.     Kidney: The right kidney measures 12.2 cm long. There is no  hydronephrosis, mass lesion or obstructing calculus. Right kidney  cortex is diffusely prominent and echogenic.      Impression    IMPRESSION:   1.  Sonographic evidence of intrinsic hepatic parenchymal disease  including hepatic steatosis.  No focal liver lesion. Equivocal trace  perihepatic ascites.     2.  Echogenic right kidney compatible with medical renal disease.  3. Cholecystectomy.  Common bile duct measures approximately 2 mm in  diameter, although difficult to visualize with ultrasound.       I have personally reviewed the examination and initial interpretation  and I agree with the findings.    CATALINA PAYNE MD         SYSTEM ID:  [id number]   MR Abdomen MRCP w/o & w Contrast    Narrative    MRI ABDOMEN    CLINICAL HISTORY:  History of biliary obstruction status post  cholecystectomy, ERCP/stent, admitted with sepsis, exclude cholangitis    TECHNIQUE:  Images were acquired with and without intravenous contrast  through the abdomen. The following MR images were acquired: TrueFISP,  multiplanar T2 weighted, axial T1 in/out of phase, axial fat-saturated  T1, diffusion-weighted. Multiplanar T1-weighted images with fat  saturation were before contrast administration and at multiple time  points following the administration of intravenous contrast. Contrast  dose: 6 mL gadavist    FINDINGS:    Comparison study: CT 5/19/2023, 1/12/2023    Liver: Heterogeneous enhancement of the liver on the arterial phase  with mild periportal edema. No focal hepatic mass. Homogenous  enhancement of the liver parenchyma on the portal venous and  delayed  phases. No hepatic steatosis or evidence of iron overload. The common  bile duct and intrahepatic biliary system do not appear dilated.  Somewhat narrowed appearance of the common bile duct near the  bifurcation without obvious obstructing mass. Postcontrast, the  biliary tree does not appear significantly hyperemic and no definite  thickening of the common bile duct is appreciated.     Gallbladder: Cholecystectomy.    Spleen: Splenectomy.    Kidneys: No focal mass. No hydronephrosis.    Adrenal glands: Unremarkable.    Pancreas: Pancreatectomy.    Bowel: Percutaneous gastrostomy and jejunostomy tubes. No obstruction.    Lymph nodes: Unremarkable.    Blood vessels: The aorta and portal vein are patent.    Lung bases: Small bilateral pleural effusions.    Bones and soft tissues: Hemangioma suspected in the L2 vertebral body  on the right, series 4 image 17, stable from prior CT.    Mesentery and abdominal wall: Minor body wall edema.    Ascites: Small volume ascites.      Impression    IMPRESSION:  1. The common bile duct appears somewhat narrowed near the  bifurcation, however there is no upstream intrahepatic biliary  dilatation to suggest obstruction. No definite findings to suggest  acute cholangitis.  2. Heterogeneous enhancement of the liver on arterial phase sequence  with associated periportal edema. Findings are nonspecific but can be  seen with acute hepatitis. Alternatively, periportal edema may be due  to overall third spacing of fluid with ascites and some body wall  edema also noted. Correlation with liver function tests.  3. Small volume ascites.    I have personally reviewed the examination and initial interpretation  and I agree with the findings.    MACRINA CORRIGAN MD         SYSTEM ID:  [id number]     *Note: Due to a large number of results and/or encounters for the requested time period, some results have not been displayed. A complete set of results can be found in Results Review.

## 2023-11-08 NOTE — TELEPHONE ENCOUNTER
----- Message from Harsh Davenport RN sent at 11/8/2023  3:03 PM CST -----  Regarding: FW: blood cultures    ----- Message -----  From: Izabel Becker APRN CNP  Sent: 11/8/2023   3:02 PM CST  To: [facility] Infectious Disease Adult [facility]  Subject: blood cultures                                   Hi,  Can someone please fax these blood culture orders over to [facility] outpatient clinic lab today   Thank you,  Izabel

## 2023-11-08 NOTE — NURSING NOTE
Is the patient currently in the state of [place]? YES    Visit mode:VIDEO    If the visit is dropped, the patient can be reconnected by: VIDEO VISIT: Text to cell phone:   Telephone Information:   Mobile [phone]       Will anyone else be joining the visit? NO  (If patient encounters technical issues they should call [phone])    How would you like to obtain your AVS? MyChart    Are changes needed to the allergy or medication list? Pt stated no changes to allergies and Pt stated no med changes    Reason for visit: RECHECK    Meredith RAMIREZF

## 2023-11-09 ENCOUNTER — VIRTUAL VISIT (OUTPATIENT)
Dept: ONCOLOGY | Facility: CLINIC | Age: 58
End: 2023-11-09
Payer: COMMERCIAL

## 2023-11-09 ENCOUNTER — APPOINTMENT (OUTPATIENT)
Dept: INTERVENTIONAL RADIOLOGY/VASCULAR | Facility: CLINIC | Age: 58
End: 2023-11-09
Attending: NURSE PRACTITIONER
Payer: COMMERCIAL

## 2023-11-09 DIAGNOSIS — Z51.5 PALLIATIVE CARE PATIENT: ICD-10-CM

## 2023-11-09 DIAGNOSIS — F43.23 ADJUSTMENT DISORDER WITH MIXED ANXIETY AND DEPRESSED MOOD: Primary | ICD-10-CM

## 2023-11-09 PROBLEM — R10.9 ABDOMINAL PAIN, UNSPECIFIED ABDOMINAL LOCATION: Status: ACTIVE | Noted: 2023-11-09

## 2023-11-09 LAB
ACINETOBACTER SPECIES: NOT DETECTED
ATRIAL RATE - MUSE: 81 BPM
CITROBACTER SPECIES: NOT DETECTED
CTX-M: NOT DETECTED
DIASTOLIC BLOOD PRESSURE - MUSE: NORMAL MMHG
ENTEROBACTER SPECIES: NOT DETECTED
ESCHERICHIA COLI: NOT DETECTED
GLUCOSE BLDC GLUCOMTR-MCNC: 165 MG/DL (ref 70–99)
GLUCOSE BLDC GLUCOMTR-MCNC: 80 MG/DL (ref 70–99)
GLUCOSE BLDC GLUCOMTR-MCNC: 96 MG/DL (ref 70–99)
GLUCOSE BLDC GLUCOMTR-MCNC: 99 MG/DL (ref 70–99)
IMP: NOT DETECTED
INTERPRETATION ECG - MUSE: NORMAL
KLEBSIELLA OXYTOCA: NOT DETECTED
KLEBSIELLA PNEUMONIAE: DETECTED
KPC: NOT DETECTED
NDM: NOT DETECTED
OXA (DETECTED/NOT DETECTED): NOT DETECTED
P AXIS - MUSE: 69 DEGREES
PR INTERVAL - MUSE: 142 MS
PROCALCITONIN SERPL IA-MCNC: 0.44 NG/ML
PROTEUS SPECIES: NOT DETECTED
PSEUDOMONAS AERUGINOSA: NOT DETECTED
QRS DURATION - MUSE: 78 MS
QT - MUSE: 398 MS
QTC - MUSE: 462 MS
R AXIS - MUSE: 25 DEGREES
SYSTOLIC BLOOD PRESSURE - MUSE: NORMAL MMHG
T AXIS - MUSE: 60 DEGREES
VENTRICULAR RATE- MUSE: 81 BPM
VIM: NOT DETECTED

## 2023-11-09 PROCEDURE — 87077 CULTURE AEROBIC IDENTIFY: CPT

## 2023-11-09 PROCEDURE — G0378 HOSPITAL OBSERVATION PER HR: HCPCS

## 2023-11-09 PROCEDURE — 99222 1ST HOSP IP/OBS MODERATE 55: CPT | Mod: GC | Performed by: INTERNAL MEDICINE

## 2023-11-09 PROCEDURE — 36590 REMOVAL TUNNELED CV CATH: CPT

## 2023-11-09 PROCEDURE — 250N000013 HC RX MED GY IP 250 OP 250 PS 637: Performed by: EMERGENCY MEDICINE

## 2023-11-09 PROCEDURE — 250N000009 HC RX 250: Performed by: STUDENT IN AN ORGANIZED HEALTH CARE EDUCATION/TRAINING PROGRAM

## 2023-11-09 PROCEDURE — 250N000013 HC RX MED GY IP 250 OP 250 PS 637

## 2023-11-09 PROCEDURE — 99152 MOD SED SAME PHYS/QHP 5/>YRS: CPT

## 2023-11-09 PROCEDURE — 96374 THER/PROPH/DIAG INJ IV PUSH: CPT | Mod: 59 | Performed by: EMERGENCY MEDICINE

## 2023-11-09 PROCEDURE — 36590 REMOVAL TUNNELED CV CATH: CPT | Mod: RT | Performed by: STUDENT IN AN ORGANIZED HEALTH CARE EDUCATION/TRAINING PROGRAM

## 2023-11-09 PROCEDURE — 87070 CULTURE OTHR SPECIMN AEROBIC: CPT

## 2023-11-09 PROCEDURE — 250N000011 HC RX IP 250 OP 636: Mod: JZ

## 2023-11-09 PROCEDURE — 96376 TX/PRO/DX INJ SAME DRUG ADON: CPT

## 2023-11-09 PROCEDURE — 96375 TX/PRO/DX INJ NEW DRUG ADDON: CPT

## 2023-11-09 PROCEDURE — 250N000011 HC RX IP 250 OP 636: Performed by: EMERGENCY MEDICINE

## 2023-11-09 PROCEDURE — 250N000012 HC RX MED GY IP 250 OP 636 PS 637

## 2023-11-09 PROCEDURE — 02PY03Z REMOVAL OF INFUSION DEVICE FROM GREAT VESSEL, OPEN APPROACH: ICD-10-PCS | Performed by: STUDENT IN AN ORGANIZED HEALTH CARE EDUCATION/TRAINING PROGRAM

## 2023-11-09 PROCEDURE — 93005 ELECTROCARDIOGRAM TRACING: CPT

## 2023-11-09 PROCEDURE — 250N000011 HC RX IP 250 OP 636

## 2023-11-09 PROCEDURE — 0JPT0WZ REMOVAL OF TOTALLY IMPLANTABLE VASCULAR ACCESS DEVICE FROM TRUNK SUBCUTANEOUS TISSUE AND FASCIA, OPEN APPROACH: ICD-10-PCS | Performed by: STUDENT IN AN ORGANIZED HEALTH CARE EDUCATION/TRAINING PROGRAM

## 2023-11-09 PROCEDURE — 250N000011 HC RX IP 250 OP 636: Performed by: STUDENT IN AN ORGANIZED HEALTH CARE EDUCATION/TRAINING PROGRAM

## 2023-11-09 PROCEDURE — 272N000192 HC ACCESSORY CR2: Mod: GT,95

## 2023-11-09 PROCEDURE — 82962 GLUCOSE BLOOD TEST: CPT

## 2023-11-09 PROCEDURE — 96365 THER/PROPH/DIAG IV INF INIT: CPT

## 2023-11-09 PROCEDURE — 36415 COLL VENOUS BLD VENIPUNCTURE: CPT

## 2023-11-09 PROCEDURE — 87040 BLOOD CULTURE FOR BACTERIA: CPT

## 2023-11-09 PROCEDURE — 96375 TX/PRO/DX INJ NEW DRUG ADDON: CPT | Performed by: EMERGENCY MEDICINE

## 2023-11-09 PROCEDURE — 258N000003 HC RX IP 258 OP 636: Performed by: EMERGENCY MEDICINE

## 2023-11-09 PROCEDURE — 99254 IP/OBS CNSLTJ NEW/EST MOD 60: CPT | Mod: 24 | Performed by: STUDENT IN AN ORGANIZED HEALTH CARE EDUCATION/TRAINING PROGRAM

## 2023-11-09 RX ORDER — HYDROMORPHONE HYDROCHLORIDE 1 MG/ML
0.5 INJECTION, SOLUTION INTRAMUSCULAR; INTRAVENOUS; SUBCUTANEOUS
Status: DISCONTINUED | OUTPATIENT
Start: 2023-11-09 | End: 2023-11-09

## 2023-11-09 RX ORDER — AMOXICILLIN 250 MG
1 CAPSULE ORAL 2 TIMES DAILY PRN
Status: DISCONTINUED | OUTPATIENT
Start: 2023-11-09 | End: 2023-11-15 | Stop reason: HOSPADM

## 2023-11-09 RX ORDER — NALOXONE HYDROCHLORIDE 0.4 MG/ML
0.4 INJECTION, SOLUTION INTRAMUSCULAR; INTRAVENOUS; SUBCUTANEOUS
Status: DISCONTINUED | OUTPATIENT
Start: 2023-11-09 | End: 2023-11-11 | Stop reason: HOSPADM

## 2023-11-09 RX ORDER — NALOXONE HYDROCHLORIDE 0.4 MG/ML
0.4 INJECTION, SOLUTION INTRAMUSCULAR; INTRAVENOUS; SUBCUTANEOUS
Status: DISCONTINUED | OUTPATIENT
Start: 2023-11-09 | End: 2023-11-15 | Stop reason: HOSPADM

## 2023-11-09 RX ORDER — AMOXICILLIN 250 MG
2 CAPSULE ORAL 2 TIMES DAILY PRN
Status: DISCONTINUED | OUTPATIENT
Start: 2023-11-09 | End: 2023-11-15 | Stop reason: HOSPADM

## 2023-11-09 RX ORDER — NALOXONE HYDROCHLORIDE 0.4 MG/ML
0.2 INJECTION, SOLUTION INTRAMUSCULAR; INTRAVENOUS; SUBCUTANEOUS
Status: DISCONTINUED | OUTPATIENT
Start: 2023-11-09 | End: 2023-11-15 | Stop reason: HOSPADM

## 2023-11-09 RX ORDER — PROCHLORPERAZINE MALEATE 10 MG
10 TABLET ORAL EVERY 6 HOURS PRN
Status: DISCONTINUED | OUTPATIENT
Start: 2023-11-09 | End: 2023-11-15 | Stop reason: HOSPADM

## 2023-11-09 RX ORDER — SCOLOPAMINE TRANSDERMAL SYSTEM 1 MG/1
1 PATCH, EXTENDED RELEASE TRANSDERMAL
Status: DISCONTINUED | OUTPATIENT
Start: 2023-11-10 | End: 2023-11-09

## 2023-11-09 RX ORDER — ACETAMINOPHEN 325 MG/10.15ML
650 LIQUID ORAL EVERY 6 HOURS PRN
Status: DISCONTINUED | OUTPATIENT
Start: 2023-11-09 | End: 2023-11-09

## 2023-11-09 RX ORDER — CEFTRIAXONE 2 G/1
2 INJECTION, POWDER, FOR SOLUTION INTRAMUSCULAR; INTRAVENOUS EVERY 24 HOURS
Status: DISCONTINUED | OUTPATIENT
Start: 2023-11-09 | End: 2023-11-14

## 2023-11-09 RX ORDER — BACLOFEN 10 MG/1
10 TABLET ORAL 3 TIMES DAILY
Status: ON HOLD | COMMUNITY
End: 2023-11-15

## 2023-11-09 RX ORDER — NICOTINE POLACRILEX 4 MG
15-30 LOZENGE BUCCAL
Status: DISCONTINUED | OUTPATIENT
Start: 2023-11-09 | End: 2023-11-15 | Stop reason: HOSPADM

## 2023-11-09 RX ORDER — LORAZEPAM 2 MG/ML
0.5 INJECTION INTRAMUSCULAR EVERY 6 HOURS PRN
Status: DISCONTINUED | OUTPATIENT
Start: 2023-11-09 | End: 2023-11-15 | Stop reason: HOSPADM

## 2023-11-09 RX ORDER — NALOXONE HYDROCHLORIDE 0.4 MG/ML
0.2 INJECTION, SOLUTION INTRAMUSCULAR; INTRAVENOUS; SUBCUTANEOUS
Status: DISCONTINUED | OUTPATIENT
Start: 2023-11-09 | End: 2023-11-11 | Stop reason: HOSPADM

## 2023-11-09 RX ORDER — POLYETHYLENE GLYCOL 3350 17 G/17G
17 POWDER, FOR SOLUTION ORAL DAILY PRN
Status: DISCONTINUED | OUTPATIENT
Start: 2023-11-09 | End: 2023-11-15 | Stop reason: HOSPADM

## 2023-11-09 RX ORDER — PROMETHAZINE HYDROCHLORIDE 25 MG/ML
25 INJECTION, SOLUTION INTRAMUSCULAR; INTRAVENOUS DAILY PRN
Status: ON HOLD | COMMUNITY
End: 2023-11-15

## 2023-11-09 RX ORDER — ACETAMINOPHEN 325 MG/10.15ML
650 LIQUID ORAL EVERY 6 HOURS
Status: DISCONTINUED | OUTPATIENT
Start: 2023-11-09 | End: 2023-11-15 | Stop reason: HOSPADM

## 2023-11-09 RX ORDER — FENTANYL CITRATE 50 UG/ML
25-50 INJECTION, SOLUTION INTRAMUSCULAR; INTRAVENOUS EVERY 5 MIN PRN
Status: DISCONTINUED | OUTPATIENT
Start: 2023-11-09 | End: 2023-11-11

## 2023-11-09 RX ORDER — BACLOFEN 5 MG/1
5 TABLET ORAL ONCE
Status: COMPLETED | OUTPATIENT
Start: 2023-11-09 | End: 2023-11-09

## 2023-11-09 RX ORDER — CETIRIZINE HYDROCHLORIDE 10 MG/1
10 TABLET ORAL DAILY
Status: DISCONTINUED | OUTPATIENT
Start: 2023-11-09 | End: 2023-11-15 | Stop reason: HOSPADM

## 2023-11-09 RX ORDER — HYDROMORPHONE HYDROCHLORIDE 1 MG/ML
0.3 INJECTION, SOLUTION INTRAMUSCULAR; INTRAVENOUS; SUBCUTANEOUS
Status: DISCONTINUED | OUTPATIENT
Start: 2023-11-09 | End: 2023-11-15 | Stop reason: HOSPADM

## 2023-11-09 RX ORDER — MONTELUKAST SODIUM 10 MG/1
10 TABLET ORAL AT BEDTIME
Status: DISCONTINUED | OUTPATIENT
Start: 2023-11-09 | End: 2023-11-15 | Stop reason: HOSPADM

## 2023-11-09 RX ORDER — FLUMAZENIL 0.1 MG/ML
0.2 INJECTION, SOLUTION INTRAVENOUS
Status: DISCONTINUED | OUTPATIENT
Start: 2023-11-09 | End: 2023-11-11

## 2023-11-09 RX ORDER — DIPHENHYDRAMINE HCL 12.5MG/5ML
25 LIQUID (ML) ORAL 4 TIMES DAILY PRN
Status: DISCONTINUED | OUTPATIENT
Start: 2023-11-09 | End: 2023-11-15 | Stop reason: HOSPADM

## 2023-11-09 RX ORDER — AMITRIPTYLINE HYDROCHLORIDE 10 MG/1
40 TABLET ORAL AT BEDTIME
Status: DISCONTINUED | OUTPATIENT
Start: 2023-11-09 | End: 2023-11-15 | Stop reason: HOSPADM

## 2023-11-09 RX ORDER — LIDOCAINE 4 G/G
1 PATCH TOPICAL EVERY 24 HOURS
Status: DISCONTINUED | OUTPATIENT
Start: 2023-11-09 | End: 2023-11-15 | Stop reason: HOSPADM

## 2023-11-09 RX ORDER — FAMOTIDINE 40 MG/5ML
20 POWDER, FOR SUSPENSION ORAL DAILY
Status: DISCONTINUED | OUTPATIENT
Start: 2023-11-09 | End: 2023-11-11

## 2023-11-09 RX ORDER — FLUTICASONE PROPIONATE 50 MCG
1 SPRAY, SUSPENSION (ML) NASAL DAILY PRN
Status: DISCONTINUED | OUTPATIENT
Start: 2023-11-09 | End: 2023-11-15 | Stop reason: HOSPADM

## 2023-11-09 RX ORDER — SCOLOPAMINE TRANSDERMAL SYSTEM 1 MG/1
1 PATCH, EXTENDED RELEASE TRANSDERMAL
Status: DISCONTINUED | OUTPATIENT
Start: 2023-11-10 | End: 2023-11-15 | Stop reason: HOSPADM

## 2023-11-09 RX ORDER — PROCHLORPERAZINE MALEATE 10 MG
10 TABLET ORAL EVERY 6 HOURS PRN
Status: DISCONTINUED | OUTPATIENT
Start: 2023-11-09 | End: 2023-11-09

## 2023-11-09 RX ORDER — DEXTROSE MONOHYDRATE 25 G/50ML
25-50 INJECTION, SOLUTION INTRAVENOUS
Status: DISCONTINUED | OUTPATIENT
Start: 2023-11-09 | End: 2023-11-15 | Stop reason: HOSPADM

## 2023-11-09 RX ORDER — METHOCARBAMOL 750 MG/1
750 TABLET, FILM COATED ORAL 4 TIMES DAILY PRN
Status: DISCONTINUED | OUTPATIENT
Start: 2023-11-09 | End: 2023-11-15 | Stop reason: HOSPADM

## 2023-11-09 RX ADMIN — HYDROMORPHONE HYDROCHLORIDE 0.5 MG: 1 INJECTION, SOLUTION INTRAMUSCULAR; INTRAVENOUS; SUBCUTANEOUS at 00:03

## 2023-11-09 RX ADMIN — ACETAMINOPHEN 650 MG: 325 SOLUTION ORAL at 14:37

## 2023-11-09 RX ADMIN — PROMETHAZINE HYDROCHLORIDE 25 MG: 25 INJECTION INTRAMUSCULAR; INTRAVENOUS at 10:33

## 2023-11-09 RX ADMIN — HYDROMORPHONE HYDROCHLORIDE 0.3 MG: 1 INJECTION, SOLUTION INTRAMUSCULAR; INTRAVENOUS; SUBCUTANEOUS at 18:46

## 2023-11-09 RX ADMIN — LEVOTHYROXINE SODIUM 137 MCG: 0.11 TABLET ORAL at 10:59

## 2023-11-09 RX ADMIN — ACETAMINOPHEN 650 MG: 325 SOLUTION ORAL at 20:23

## 2023-11-09 RX ADMIN — CEFTRIAXONE SODIUM 2 G: 2 INJECTION, POWDER, FOR SOLUTION INTRAMUSCULAR; INTRAVENOUS at 07:07

## 2023-11-09 RX ADMIN — ACETAMINOPHEN 650 MG: 325 SOLUTION ORAL at 00:03

## 2023-11-09 RX ADMIN — PROMETHAZINE HYDROCHLORIDE 25 MG: 25 INJECTION INTRAMUSCULAR; INTRAVENOUS at 00:32

## 2023-11-09 RX ADMIN — FAMOTIDINE 20 MG: 40 POWDER, FOR SUSPENSION ORAL at 10:58

## 2023-11-09 RX ADMIN — BACLOFEN 5 MG: 5 TABLET ORAL at 03:04

## 2023-11-09 RX ADMIN — SENNOSIDES 5 ML: 8.8 LIQUID ORAL at 20:23

## 2023-11-09 RX ADMIN — INSULIN ASPART 1 UNITS: 100 INJECTION, SOLUTION INTRAVENOUS; SUBCUTANEOUS at 22:08

## 2023-11-09 RX ADMIN — PROCHLORPERAZINE MALEATE 10 MG: 10 TABLET ORAL at 20:21

## 2023-11-09 RX ADMIN — METHOCARBAMOL TABLETS 750 MG: 750 TABLET, COATED ORAL at 10:59

## 2023-11-09 RX ADMIN — DIPHENHYDRAMINE HYDROCHLORIDE 25 MG: 25 SOLUTION ORAL at 21:29

## 2023-11-09 RX ADMIN — AMITRIPTYLINE HYDROCHLORIDE 40 MG: 10 TABLET, FILM COATED ORAL at 20:22

## 2023-11-09 RX ADMIN — HYDROMORPHONE HYDROCHLORIDE 0.3 MG: 1 INJECTION, SOLUTION INTRAMUSCULAR; INTRAVENOUS; SUBCUTANEOUS at 22:41

## 2023-11-09 RX ADMIN — SENNOSIDES 5 ML: 8.8 LIQUID ORAL at 11:01

## 2023-11-09 RX ADMIN — MIDAZOLAM 0.5 MG: 1 INJECTION INTRAMUSCULAR; INTRAVENOUS at 16:31

## 2023-11-09 RX ADMIN — FENTANYL CITRATE 25 MCG: 50 INJECTION, SOLUTION INTRAMUSCULAR; INTRAVENOUS at 16:22

## 2023-11-09 RX ADMIN — HYDROMORPHONE HYDROCHLORIDE 0.3 MG: 1 INJECTION, SOLUTION INTRAMUSCULAR; INTRAVENOUS; SUBCUTANEOUS at 10:01

## 2023-11-09 RX ADMIN — MIDAZOLAM 0.5 MG: 1 INJECTION INTRAMUSCULAR; INTRAVENOUS at 16:22

## 2023-11-09 RX ADMIN — LORAZEPAM 0.5 MG: 2 INJECTION INTRAMUSCULAR; INTRAVENOUS at 14:37

## 2023-11-09 RX ADMIN — LIDOCAINE 1 PATCH: 4 PATCH TOPICAL at 10:01

## 2023-11-09 RX ADMIN — LIDOCAINE HYDROCHLORIDE 8 ML: 10 INJECTION, SOLUTION EPIDURAL; INFILTRATION; INTRACAUDAL; PERINEURAL at 16:31

## 2023-11-09 RX ADMIN — FENTANYL CITRATE 25 MCG: 50 INJECTION, SOLUTION INTRAMUSCULAR; INTRAVENOUS at 16:32

## 2023-11-09 RX ADMIN — HYDROMORPHONE HYDROCHLORIDE 0.3 MG: 1 INJECTION, SOLUTION INTRAMUSCULAR; INTRAVENOUS; SUBCUTANEOUS at 06:36

## 2023-11-09 ASSESSMENT — ACTIVITIES OF DAILY LIVING (ADL)
ADLS_ACUITY_SCORE: 37

## 2023-11-09 NOTE — IR NOTE
Patient Name: Dinora Mcghee  Medical Record Number: [id number]  Today's Date: 11/9/2023    Procedure: port removal  Proceduralist: Dr. A. Heshmatzadeh Behzadi , Dr TONIE Fields, Dr EDER Waller    Sedation start time: 1622  Sedation end time: 1650  Sedation medications administered: 1 mg versed, 50 mcg fentanyl  Total sedation time: 28 min  Sedation Notes: none     Procedure start time: 1626  Procedure end time: 1650    Report given to: Ke [facility]ERSON ED  : none    Other Notes: Pt arrived to IR room 2 from ED. Consent reviewed, pt confirmed. Pt denies any questions or concerns regarding procedure. Pt positioned supine and monitored per protocol. Right chest site cleansed and dressed per protocol. Catheter tip send to lab as ordered. Pt tolerated procedure without any noted complications. Pt transferred back to ED.

## 2023-11-09 NOTE — PROCEDURES
[facility]    Procedure: IR Procedure Note    Date/Time: 11/9/2023 5:06 PM    Performed by: Behzadi, Heshmatzadeh, MD  Authorized by: Stanley Waller MD  IR Fellow Physician: Ashkan Behzadi  Radiology Resident Physician: Alfredo Fields        UNIVERSAL PROTOCOL   Site Marked: NA  Prior Images Obtained and Reviewed:  Yes  Required items: Required blood products, implants, devices and special equipment available    Patient identity confirmed:  Verbally with patient, arm band, provided demographic data and hospital-assigned identification number  Patient was reevaluated immediately before administering moderate or deep sedation or anesthesia  Confirmation Checklist:  Patient's identity using two indicators, relevant allergies, procedure was appropriate and matched the consent or emergent situation and correct equipment/implants were available  Time out: Immediately prior to the procedure a time out was called    Universal Protocol: the Joint Commission Universal Protocol was followed    Preparation: Patient was prepped and draped in usual sterile fashion    ESBL (mL):  1     ANESTHESIA    Anesthesia: Local infiltration  Local Anesthetic:  Lidocaine 1% without epinephrine      SEDATION  Patient Sedated: Yes    Sedation:  Fentanyl and midazolam  Vital signs: Vital signs monitored during sedation    See dictated procedure note for full details.  Findings: Small open pocket of skin overlying port site, requiring suture upon port removal.    Specimens: other (see comment) (Port catheter tip)    Complications: None    Condition: Stable    Plan: Return to ED.      PROCEDURE  Describe Procedure: Successful right chest port removal.  Patient Tolerance:  Patient tolerated the procedure well with no immediate complications  Length of time physician/provider present for 1:1 monitoring during sedation: 30

## 2023-11-09 NOTE — ED NOTES
Pts port was accidentally de accessed by pt. (The port was accessed pta, not here). This RN noticed the port is visible in the skin with some degree of skin breakdown at the port site. Presumably pt had a scab that ripped off at some point and now the port is partially exposed (the size of a pencil eraser.) dressed with nonadherent pad and [id number] tape (pt has tape allergies/ sensitivities).

## 2023-11-09 NOTE — PRE-PROCEDURE
GENERAL PRE-PROCEDURE:   Date/Time:  11/9/2023 4:09 PM    Verbal consent obtained?: Yes    Risks and benefits: Risks, benefits and alternatives were discussed    Patient states understanding of procedure being performed: Yes    Patient's understanding of procedure matches consent: Yes    Procedure consent matches procedure scheduled: Yes    Expected level of sedation:  Moderate  Appropriately NPO:  Yes  ASA Class:  2  Mallampati  :  Grade 2- soft palate, base of uvula, tonsillar pillars, and portion of posterior pharyngeal wall visible  Lungs:  Lungs clear with good breath sounds bilaterally  Heart:  Normal heart sounds and rate  History & Physical reviewed:  History and physical reviewed and no updates needed  Statement of review:  I have reviewed the lab findings, diagnostic data, medications, and the plan for sedation

## 2023-11-09 NOTE — Clinical Note
11/9/2023         RE: Dinora Mcghee  [address] [facility] [address]        Dear Colleague,    Thank you for referring your patient, Dinora Mcghee, to the [facility] [place]. Please see a copy of my visit note below.    Virtual Visit Details    Type of service:  Video Visit     Originating Location (pt. Location): Other emergency room  {PROVIDER LOCATION On-site should be selected for visits conducted from your clinic location or adjoining [facility] hospital, academic office, or other nearby [facility] building. Off-site should be selected for all other provider locations, including home:[id number]}  Distant Location (provider location):  On-site  Platform used for Video Visit: [employer]  Virtual Visit Details    Type of service:  Video Visit     Originating Location (pt. Location): Home    Distant Location (provider location):  On-site  Platform used for Video Visit: [employer]     Telemedicine Visit: The patient's condition can be safely assessed and treated via synchronous audio and visual telemedicine encounter.        Palliative Care Clinical Social Work Return Appointment    PLEASE NOTE:  THIS IS A MENTAL HEALTH NOTE.  OTHER PROVIDERS VIEWING THIS NOTE SHOULD USE THIS ONLY FOR UNDERSTANDING THE CONTEXT OF THE PATIENT'S EXPERIENCE.  TOPICS DESCRIBED IN THIS NOTE SHOULD NOT BE REFERENCED TO THE PATIENT BY MEDICAL PROVIDERS.    Dinora Mcghee is a 58  year old woman with multiple medical conditions including chronic pancreatitis s/p TPAIT, long term complications from gastroparesis, constipation, GERD, migraines.  Had GJ placed Sept 2021, recovery was complicated and she ended up with a venting g-tube.  Due to complications with PO tolerance she had surgical placement of a J tube, and a resection of densely adhered small bowel.  Started on tube feedings, then on TPN.  TPN eventually stopped due to infection.  Has heavy symptom burden that includes chronic nausea and vomiting, fatigue, complex medical  "management. Seen today to address adjustment disorder with depressed mood and anxiety.     Today visit took place in the emergency room where Dinora was waiting for a bed due to infection. Visit was put through at the encouragement of her ED doctor due to emotional distress.     BEcause of room shortage she was in the hallway.  Dinora was clear that she did want to proceed with today's visit even though she was not in a private space at first.  She was able to move into a private room later in the visit.      Mental Status Exam:(List all that apply)      Appearance: Appropriate      Eye Contact: Good       Orientation: Yes, x4      Mood: Depressed grieving       Affect: Appropriate -- tearful       Thought Content: Clear         Thought Form: Logical      Psychomotor Behavior: Normal    Visit themes: Anxiety, trauma, stress     Adjustment to Illness:  Spoke to Dinora by video today while she was boarding in the emergency room due to infection.  She shares the plan is to remove her port, place a picc line, and then replace her port after infection is better managed.  Very tearful and discouraged asks questions \"why am I doing all of this?\" she feels that her health continues to deteriorate.  Boarding in the ED has been intensely stressful.        Mental Health (thoughts, feelings, actions, coping, and symptoms):     Tearful today. Is boarding in the hallway of the ED which is intensely stressful and she does not know when she will be able to get a hospital bed.  She doesn't know   how long she will be hospitalized \"every time I come in its for at least 5 days\".      Existential distress and grief    Visit concluded when nurse arrived to start prepping her for surgery / port removal.     Helpful activities: time with family.  Meditating, knitting, tapping.  Uses EMDR techniques.        Helpful cognitions:     Unhelpful and/or triggering or exacerbating factors:     Body-Mind Skills Education: uses tapping and EMDR "     Relationships:  and children, extended family    End of Life and Advance Care Planning:     Main therapeutic interventions provided this session include:     Provide psychoeducation and Facilitate processing of thoughts and feelings    Plan:  Will return for psychotherapy with palliative care focus in 1 week    Time spent with patient/family:  20  (Start 3:30 end 3:50)    JAIDA Nguyen, [facility]   Palliative Care Clinical   Ph: [phone]      DO NOT SEND ANY LETTERS                  Again, thank you for allowing me to participate in the care of your patient.        Sincerely,        VASQUEZ Nguyen

## 2023-11-09 NOTE — PROGRESS NOTES
Virtual Visit Details    Type of service:  Video Visit     Originating Location (pt. Location): Other emergency room    Distant Location (provider location):  On-site  Platform used for Video Visit: [employer]  Virtual Visit Details    Type of service:  Video Visit     Originating Location (pt. Location): Home    Distant Location (provider location):  On-site  Platform used for Video Visit: [employer]     Telemedicine Visit: The patient's condition can be safely assessed and treated via synchronous audio and visual telemedicine encounter.        Palliative Care Clinical Social Work Return Appointment    PLEASE NOTE:  THIS IS A MENTAL HEALTH NOTE.  OTHER PROVIDERS VIEWING THIS NOTE SHOULD USE THIS ONLY FOR UNDERSTANDING THE CONTEXT OF THE PATIENT'S EXPERIENCE.  TOPICS DESCRIBED IN THIS NOTE SHOULD NOT BE REFERENCED TO THE PATIENT BY MEDICAL PROVIDERS.    Dinora Mcghee is a 58  year old woman with multiple medical conditions including chronic pancreatitis s/p TPAIT, long term complications from gastroparesis, constipation, GERD, migraines.  Had GJ placed Sept 2021, recovery was complicated and she ended up with a venting g-tube.  Due to complications with PO tolerance she had surgical placement of a J tube, and a resection of densely adhered small bowel.  Started on tube feedings, then on TPN.  TPN eventually stopped due to infection.  Has heavy symptom burden that includes chronic nausea and vomiting, fatigue, complex medical management. Seen today to address adjustment disorder with depressed mood and anxiety.     Today visit took place in the emergency room where Dinora was waiting for a bed due to infection. Visit was put through at the encouragement of her ED doctor due to emotional distress.     BEcause of room shortage she was in the hallway.  Dinora was clear that she did want to proceed with today's visit even though she was not in a private space at first.  She was able to move into a private room later in the  "visit.      Mental Status Exam:(List all that apply)      Appearance: Appropriate      Eye Contact: Good       Orientation: Yes, x4      Mood: Depressed grieving       Affect: Appropriate -- tearful       Thought Content: Clear         Thought Form: Logical      Psychomotor Behavior: Normal    Visit themes: Anxiety, trauma, stress     Adjustment to Illness:  Spoke to Dinora by video today while she was boarding in the emergency room due to infection.  She shares the plan is to remove her port, place a picc line, and then replace her port after infection is better managed.  Very tearful and discouraged asks questions \"why am I doing all of this?\" she feels that her health continues to deteriorate.  Boarding in the ED has been intensely stressful.        Mental Health (thoughts, feelings, actions, coping, and symptoms):     Tearful today. Is boarding in the hallway of the ED which is intensely stressful and she does not know when she will be able to get a hospital bed.  She doesn't know   how long she will be hospitalized \"every time I come in its for at least 5 days\".      Existential distress and grief    Visit concluded when nurse arrived to start prepping her for surgery / port removal.     Helpful activities: time with family.  Meditating, knitting, tapping.  Uses EMDR techniques.        Helpful cognitions:     Unhelpful and/or triggering or exacerbating factors:     Body-Mind Skills Education: uses tapping and EMDR     Relationships:  and children, extended family    End of Life and Advance Care Planning:     Main therapeutic interventions provided this session include:     Provide psychoeducation and Facilitate processing of thoughts and feelings    Plan:  Will return for psychotherapy with palliative care focus when out of the hospital.  We do have a visit scheduled fo rnext week and will keep the appointment in place in hopes she is discharged by that time.     Visit not billable     Time spent with " patient/family:  20  (Start 3:30 end 3:50)    JAIDA Nguyen, [facility]   Palliative Care Clinical   Ph: [phone]      DO NOT SEND ANY LETTERS

## 2023-11-09 NOTE — Clinical Note
11/9/2023         RE: Dinora Mgchee  [address] [facility] [address]        Dear Colleague,    Thank you for referring your patient, Dinora Mcghee, to the [facility] [place]. Please see a copy of my visit note below.    Virtual Visit Details    Type of service:  Video Visit     Originating Location (pt. Location): Other emergency room  {PROVIDER LOCATION On-site should be selected for visits conducted from your clinic location or adjoining Health system hospital, academic office, or other nearby Health system building. Off-site should be selected for all other provider locations, including home:[id number]}  Distant Location (provider location):  On-site  Platform used for Video Visit: [employer]  Virtual Visit Details    Type of service:  Video Visit     Originating Location (pt. Location): Home    Distant Location (provider location):  On-site  Platform used for Video Visit: [employer]     Telemedicine Visit: The patient's condition can be safely assessed and treated via synchronous audio and visual telemedicine encounter.        Palliative Care Clinical Social Work Return Appointment    PLEASE NOTE:  THIS IS A MENTAL HEALTH NOTE.  OTHER PROVIDERS VIEWING THIS NOTE SHOULD USE THIS ONLY FOR UNDERSTANDING THE CONTEXT OF THE PATIENT'S EXPERIENCE.  TOPICS DESCRIBED IN THIS NOTE SHOULD NOT BE REFERENCED TO THE PATIENT BY MEDICAL PROVIDERS.    Dinora Mcghee is a 58  year old woman with multiple medical conditions including chronic pancreatitis s/p TPAIT, long term complications from gastroparesis, constipation, GERD, migraines.  Had GJ placed Sept 2021, recovery was complicated and she ended up with a venting g-tube.  Due to complications with PO tolerance she had surgical placement of a J tube, and a resection of densely adhered small bowel.  Started on tube feedings, then on TPN.  TPN eventually stopped due to infection.  Has heavy symptom burden that includes chronic nausea and vomiting, fatigue, complex medical  "management. Seen today to address adjustment disorder with depressed mood and anxiety.     Today visit took place in the emergency room where Dinora was waiting for a bed due to infection. Visit was put through at the encouragement of her ED doctor due to emotional distress.     BEcause of room shortage she was in the hallway.  Dinora was clear that she did want to proceed with today's visit even though she was not in a private space at first.  She was able to move into a private room later in the visit.      Mental Status Exam:(List all that apply)      Appearance: Appropriate      Eye Contact: Good       Orientation: Yes, x4      Mood: Depressed grieving       Affect: Appropriate -- tearful       Thought Content: Clear         Thought Form: Logical      Psychomotor Behavior: Normal    Visit themes: Anxiety, trauma, stress     Adjustment to Illness:  Spoke to Dinora by video today while she was boarding in the emergency room due to infection.  She shares the plan is to remove her port, place a picc line, and then replace her port after infection is better managed.  Very tearful and discouraged asks questions \"why am I doing all of this?\" she feels that her health continues to deteriorate.  Boarding in the ED has been intensely stressful.        Mental Health (thoughts, feelings, actions, coping, and symptoms):     Tearful today. Is boarding in the hallway of the ED which is intensely stressful and she does not know when she will be able to get a hospital bed.  She doesn't know   how long she will be hospitalized \"every time I come in its for at least 5 days\".      Existential distress and grief    Visit concluded when nurse arrived to start prepping her for surgery / port removal.     Helpful activities: time with family.  Meditating, knitting, tapping.  Uses EMDR techniques.        Helpful cognitions:     Unhelpful and/or triggering or exacerbating factors:     Body-Mind Skills Education: uses tapping and EMDR "     Relationships:  and children, extended family    End of Life and Advance Care Planning:     Main therapeutic interventions provided this session include:     Provide psychoeducation and Facilitate processing of thoughts and feelings    Plan:  Will return for psychotherapy with palliative care focus in 1 week    Time spent with patient/family:  20  (Start 3:30 end 3:50)    JAIDA Nguyen, [facility]   Palliative Care Clinical   Ph: [phone]      DO NOT SEND ANY LETTERS                  Again, thank you for allowing me to participate in the care of your patient.        Sincerely,        VASQUEZ Nguyen

## 2023-11-09 NOTE — ED TRIAGE NOTES
Pt arrives ambulatory to triage c/o n/v and diarrhea as well was abdominal pain in LLQ  Hx SBO and severe gastroparesis.  Recently took home pain meds and 1000 ml LR  On 12 TF through g/j tube.

## 2023-11-09 NOTE — PROGRESS NOTES
Shift: 8534-9622     VS: stable, afebrile  Pain: constant abdominal pain. Severity: 8/10  Neuro: WDL  Cardiac: WDL  Respiratory: WDL  Diet/Appetite: clear liquid diet; no appetite. No PO intake  GI/: abdominal pain   LDAs: chest port (not accessed; removed by IR @1600), R peripheral IV  Skin: WDL  Activity: ambulates independently  Test/Procedures: IR procedure to remove chest port  Labs: positive blood cultures      Shift Summary: Pt admitted for bacteremia. Chest port is suspected to be source of infection. Pt is being tx'd with abx. Pt is also experiencing on-going N&V and abdominal pain. Pt is receiving tylenol suspension and IV diluadid 0.3 mg q 6 hrs for pain. Nausea is being treated with IV promethazine.

## 2023-11-09 NOTE — ED PROVIDER NOTES
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 Pt is fed via g/j tube - 12 hour feeds during the day at a rate of 80. Pt reports J tube has been leaky. Pt reports home care nurse changed cord yesterday and heard no bowel sounds in LLQ. Pt reports last oral food was soup on Friday, which went well. Pt reports losing 7 lbs in the past week. Pt reports having Fenadryn at 1pm today. Pt reports having 1 bolus of fluids today, finished just PTA. Finished cefepime course 1 week ago. No recent fevers.     Per chart review, pt was recently admitted from 10/9/23 to 10/13/23 for CLABSI involving klebsiella pneumoniae w sepsis and shock.     Past Medical History  Past Medical History:   Diagnosis Date    Allergic rhinitis, cause unspecified     Allergy to other foods     strawberries, apples, celeries, alice, watermelon    Arthritis     left knee    Choledocholithiasis     long after cholecystectomy    Chronic abdominal pain     Chronic constipation     Chronic nausea     Chronic pancreatitis (H)     Degeneration of lumbar or lumbosacral intervertebral disc     Esophageal reflux     w/ hiatal hernia    Gastroparesis     Hiatal hernia     History of pituitary adenoma     s/p resection    Hypothyroidism     Migraines     Mild hyperlipidemia     On tube feeding diet     presence of GJ tube    Pancreatic disease     PD  stricture, 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 endometriosis growth    APPENDECTOMY       SECTION      COLONOSCOPY      COLONOSCOPY N/A 2023    Procedure: COLONOSCOPY, WITH POLYPECTOMY AND BIOPSY;  Surgeon: Percy Chamorro MD;  [facility]    ENDOSCOPIC INSERTION TUBE GASTROSTOMY N/A 2021    Procedure: Gastrojejonostomy placement with jejunopexy, PEG tube exchange;  Surgeon: Zackery Montoya MD;  [facility]    ENDOSCOPIC RETROGRADE CHOLANGIOPANCREATOGRAM N/A 2015    Procedure: ENDOSCOPIC RETROGRADE CHOLANGIOPANCREATOGRAM;  Surgeon: Mandeep Park MD;  [facility]    ENDOSCOPIC RETROGRADE CHOLANGIOPANCREATOGRAM N/A 2016    Procedure: COMBINED ENDOSCOPIC RETROGRADE CHOLANGIOPANCREATOGRAPHY, PLACE TUBE/STENT;  Surgeon: Mandeep Park MD;  [facility]    ENDOSCOPIC RETROGRADE CHOLANGIOPANCREATOGRAM N/A 3/17/2016    Procedure: COMBINED ENDOSCOPIC RETROGRADE CHOLANGIOPANCREATOGRAPHY, REMOVE FOREIGN BODY OR STENT/TUBE;  Surgeon: Mandeep Park MD;  [facility]    ENDOSCOPIC RETROGRADE CHOLANGIOPANCREATOGRAM N/A 2016    Procedure: COMBINED ENDOSCOPIC RETROGRADE CHOLANGIOPANCREATOGRAPHY, PLACE TUBE/STENT;  Surgeon: Mandeep Park MD;  [facility]    ENDOSCOPIC RETROGRADE CHOLANGIOPANCREATOGRAM N/A 2016    Procedure: COMBINED ENDOSCOPIC RETROGRADE CHOLANGIOPANCREATOGRAPHY, REMOVE FOREIGN BODY OR STENT/TUBE;  Surgeon: Mandeep Park MD;  [facility]    ENDOSCOPIC ULTRASOUND UPPER GASTROINTESTINAL TRACT (GI) N/A 10/3/2016    Procedure: ENDOSCOPIC ULTRASOUND, ESOPHAGOSCOPY / UPPER GASTROINTESTINAL TRACT (GI);   Surgeon: Guru Jose Rodas MD;  [facility]    ENTEROSCOPY SMALL BOWEL N/A 2/3/2022    Procedure: ENTEROSCOPY with possible fistula closure;  Surgeon: Francisco Dodson MD;  Location:  GI    ESOPHAGOSCOPY, GASTROSCOPY, DUODENOSCOPY (EGD), COMBINED N/A 6/24/2015    Procedure: COMBINED ESOPHAGOSCOPY, GASTROSCOPY, DUODENOSCOPY (EGD), REMOVE FOREIGN BODY;  Surgeon: Mandeep Park MD;  [facility]    ESOPHAGOSCOPY, GASTROSCOPY, DUODENOSCOPY (EGD), COMBINED N/A 10/25/2015    Procedure: COMBINED ESOPHAGOSCOPY, GASTROSCOPY, DUODENOSCOPY (EGD);  Surgeon: Sammy Amaro MD;  [facility]    ESOPHAGOSCOPY, GASTROSCOPY, DUODENOSCOPY (EGD), COMBINED N/A 10/25/2015    Procedure: COMBINED ESOPHAGOSCOPY, GASTROSCOPY, DUODENOSCOPY (EGD), BIOPSY SINGLE OR MULTIPLE;  Surgeon: Sammy Amaro MD;  [facility]    ESOPHAGOSCOPY, GASTROSCOPY, DUODENOSCOPY (EGD), COMBINED N/A 1/31/2023    Procedure: ESOPHAGOGASTRODUODENOSCOPY (EGD) with peg replacement ;  Surgeon: Mandeep Park MD;  [facility]    ESOPHAGOSCOPY, GASTROSCOPY, DUODENOSCOPY (EGD), DILATATION, COMBINED      EXCISE LESION TRUNK N/A 4/17/2017    Procedure: EXCISE LESION TRUNK;  Removal of Abdominal Foreign Body;  Surgeon: Nestor Phoenix MD;  [facility]    HC ESOPH/GAS REFLUX TEST W NASAL IMPED >1 HR N/A 11/19/2015    Procedure: ESOPHAGEAL IMPEDENCE FUNCTION TEST WITH 24 HOUR PH GREATER THAN 1 HOUR;  Surgeon: Thiago Apple MD;  [facility]    HC UGI ENDOSCOPY DIAG W BIOPSY  9/17/08    HC UGI ENDOSCOPY DIAG W BIOPSY  9/27/12    HC UGI ENDOSCOPY W ESOPHAGEAL DILATION BALLOON <30MM  9/17/08    HC UGI ENDOSCOPY W EUS N/A 5/5/2015    Procedure: COMBINED ENDOSCOPIC ULTRASOUND, ESOPHAGOSCOPY, GASTROSCOPY, DUODENOSCOPY (EGD);  Surgeon: Wm Dueñas MD;  [facility]    HC WRIST ARTHROSCOP,RELEASE XVERS LIG Bilateral 12/17/08    INJECT TRANSVERSUS ABDOMINIS PLANE (TAP) BLOCK BILATERAL Left  9/22/2016    Procedure: INJECT TRANSVERSUS ABDOMINIS PLANE (TAP) BLOCK BILATERAL;  Surgeon: Dickson Corrigan MD;  [facility]    INJECT TRIGGER POINT Bilateral 9/8/2022    Procedure: Abdominal trigger point injection with ultrasound;  Surgeon: Monika Mahajan MD;  [facility]    INJECT TRIGGER POINT SINGLE / MULTIPLE 3 OR MORE MUSCLES Right 11/15/2022    Procedure: Trigger point injections right abdomen with ultrasound guidance;  Surgeon: Monika Mahajan MD;  [facility]: [facility]    IR CHEST PORT PLACEMENT < 5 YRS OF AGE  6/10/2022    laparoscopic pineda  1995    LAPAROSCOPIC HERNIORRHAPHY INCISIONAL N/A 8/23/2018    Procedure: LAPAROSCOPIC HERNIORRHAPHY INCISIONAL;  Laparoscopic Incisional Hernia Repair with Symbotex Mesh Implant;  Surgeon: Nestor Phoenix MD;  [facility]    LAPAROSCOPIC PANCREATECTOMY, TRANSPLANT AUTO ISLET CELL N/A 12/28/2016    Procedure: LAPAROSCOPIC PANCREATECTOMY, TRANSPLANT AUTO ISLET CELL;  Surgeon: Nestor Phoenix MD;  [facility]    LAPAROTOMY EXPLORATORY N/A 1/31/2023    Procedure: Exploratory Laparotomy, lysis of adhesions, Perforated J-Junostomy Resection, Replace J-Junostomy site;  Surgeon: Elver Bauer MD;  [facility]    LAPAROTOMY, LYSIS ADHESIONS, COMBINED N/A 1/31/2023    Procedure: Dilatation of jejunostomy feeding tube, track with placement of jejunostomy tube with fluoroscopy;  Surgeon: Elver Bauer MD;  [facility]    REMOVE AND REPLACE BREAST IMPLANT PROSTHESIS N/A 12/30/2022    Procedure: Exploratory Laparotomy, lysis of adhesions, small bowel resection. Placement of gastric jejunostomy for enteral feeding.;  Surgeon: Elver Bauer MD;  [facility]    REMOVE GASTROSTOMY TUBE ADULT N/A 1/28/2022    Procedure: REMOVAL, GASTROSTOMY TUBE, ADULT;  Surgeon: Mandeep Park MD;  [facility]    REPLACE GASTROJEJUNOSTOMY TUBE, PERCUTANEOUS N/A 9/7/2021    Procedure: GASTROJEJUNOSTOMY TUBE PLACEMENT, PERCUTANEOUS,  WITH GASTROPEXY;  Surgeon: Mandeep Park MD;  [facility]    REPLACE GASTROJEJUNOSTOMY TUBE, PERCUTANEOUS N/A 2021    Procedure: REPLACEMENT, GASTROJEJUNOSTOMY TUBE, PERCUTANEOUS;  Surgeon: Zackery Montoya MD;  [facility]    REPLACE GASTROJEJUNOSTOMY TUBE, PERCUTANEOUS N/A 2022    Procedure: REPLACEMENT, GASTROJEJUNOSTOMY TUBE, PERCUTANEOUS;  Surgeon: Mandeep Park MD;  [facility]    REPLACE GASTROJEJUNOSTOMY TUBE, PERCUTANEOUS N/A 2022    Procedure: REPLACEMENT, GASTROJEJUNOSTOMY TUBE, PERCUTANEOUS with ENDOSCOPIC SUTURING.;  Surgeon: Mandeep Park MD;  [facility]    REPLACE GASTROJEJUNOSTOMY TUBE, PERCUTANEOUS N/A 2023    Procedure: Replace Gastrojejunostomy Tube, Percutaneous;  Surgeon: Mandeep Park MD;  [facility]    REPLACE JEJUNOSTOMY TUBE, PERCUTANEOUS N/A 9/10/2021    Procedure: UPPER ENDOSCOPY, REPLACEMENT OF PERCUTANEOUS GASTROJEJUNOSTOMY TUBE, T-TAG GASTROPEXY;  Surgeon: Zackery Montoya MD;  [facility]    REPLACE JEJUNOSTOMY TUBE, PERCUTANEOUS N/A 2021    Procedure: REPLACEMENT, JEJUNOSTOMY TUBE, PERCUTANEOUS;  Surgeon: Mandeep Park MD;  [facility]    REPLACE JEJUNOSTOMY TUBE, PERCUTANEOUS N/A 2023    Procedure: REPLACEMENT, JEJUNOSTOMY TUBE, PERCUTANEOUS;  Surgeon: Mandeep Park MD;  [facility]    REPLACE JEJUNOSTOMY TUBE, PERCUTANEOUS  2023    Procedure: Replace Jejunostomy Tube, Percutaneous;  Surgeon: Mandeep Park MD;  [facility]    transphenoidal pituitary resection      [facility]  DELIVERY ONLY      [facility]  DELIVERY ONLY      repeat c section with incidental cystotomy with repair    [facility] EXCIS PITUITARY,TRANSNASAL/SEPTAL      pituitary tumor removed for diabetes insipidus    [facility] TOTAL ABDOM HYSTERECTOMY      w/ bilateral salpingoophorectomy      acetaminophen (TYLENOL) 325 MG/10.15ML solution  amitriptyline (ELAVIL) 10  "MG tablet  amylase-lipase-protease (CREON) 64746-70031 units CPEP per EC capsule  Baclofen (LIORESAL) 5 MG tablet  blood glucose (PAT CONTOUR NEXT) test strip  blood glucose (NO BRAND SPECIFIED) test strip  blood glucose monitoring (PAT MICROLET) lancets  Buprenorphine HCl (BELBUCA) 150 MCG FILM buccal film  cetirizine (ZYRTEC) 10 MG tablet  Continuous Blood Gluc Sensor (FREESTYLE LISA 3 SENSOR) MISC  Digestive Enzyme Cartridge (RELIZORB) MARCO  diphenhydrAMINE (BENADRYL) 12.5 MG/5ML solution  estradiol (VAGIFEM) 10 MCG TABS vaginal tablet  famotidine (PEPCID) 40 MG/5ML suspension  fluticasone (FLONASE) 50 MCG/ACT nasal spray  glucagon 1 MG kit  glucose 40 % GEL gel  Insulin Glargine-yfgn (SEMGLEE, YFGN,) 100 UNIT/ML SOPN  insulin syringe-needle U-100 (30G X 1/2\" 0.3 ML) 30G X 1/2\" 0.3 ML miscellaneous  levothyroxine (SYNTHROID/LEVOTHROID) 137 MCG tablet  lidocaine (LIDODERM) 5 % patch  methocarbamol (ROBAXIN) 750 MG tablet  montelukast (SINGULAIR) 10 MG tablet  Nutritional Supplements (BOOST HIGH PROTEIN) LIQD  pantoprazole (PROTONIX) 40 mg IV push injection  prochlorperazine (COMPAZINE) 10 MG tablet  promethazine 25 mg  prucalopride succinate (MOTEGRITY) 2 MG tablet  scopolamine (TRANSDERM) 1 MG/3DAYS 72 hr patch  sennosides (SENOKOT) 8.8 MG/5ML syrup  Sharps Container (BD SHARPS ) MISC  silver nitrate (ARZOL SILVER NIT APPLICATORS) 75-25 % miscellaneous  STATIN NOT PRESCRIBED (INTENTIONAL)  thiamine (B-1) 100 MG tablet  UNABLE TO FIND  zinc oxide - white petrolatum (CRITIC-AID THICK MOIST BARRIER) 20-51% PSTE topical paste  ZOLMitriptan (ZOMIG-ZMT) 5 MG ODT      Allergies   Allergen Reactions    Apple Juice Anaphylaxis    Corticosteroids Other (See Comments)     All oral, IV and injectable steroids are contraindicated (unless in life threatening situations) in Islet Auto transplant recipients. They can cause irreversible loss of islet cell function. Please contact patient's transplant care " coordinator K Lidya Gaffney RN at [phone]/pager [phone] and/or endocrinologist prior to administration.      Depakote [Divalproex Sodium] Other (See Comments)     Chest pain    Zithromax [Azithromycin Dihydrate] Anaphylaxis     Noted in 4/7/08 ER    Bromfenac      Other reaction(s): Headache    Codeine      Other reaction(s): Hallucinations    Darvocet [Propoxyphene N-Apap] Itching    Morphine Nausea and Vomiting and Rash    Nalbuphine Hcl Rash     RASH :nubaine    Zosyn [Piperacillin-Tazobactam In Dex] Rash     Possible allergy, did have a diffuse rash that seemed drug related but could have also been related to soap in the hospital.     Bactrim [Sulfamethoxazole W-Trimethoprim] Other (See Comments) and Nausea and Vomiting     Severely low liver function.    Statins Other (See Comments)     Previous liver issues, risks vs benefits felt to not warrant statin.  Discussed Oct 2022 visit    Tape [Adhesive Tape] Blisters    Tramadol     Zofran [Ondansetron] Other (See Comments)     migraine    Flagyl [Metronidazole] Hives and Rash     Family History  Family History   Problem Relation Age of Onset    Lipids Mother     Hypertension Mother     Thyroid Disease Mother     Depression Mother     Angina Mother     GERD Mother     Skin Cancer Mother     Migraines Mother     Autoimmune Disease Mother     Hyperlipidemia Mother     Mental Illness Mother     Eye Disorder Father         cataract, detached retina    Myocardial Infarction Father 60    Lipids Father     Cerebrovascular Disease Father     Depression Father     Substance Abuse Father     Anesthesia Reaction Father         stroke right after surgery    Cataracts Father     Osteoarthritis Father     Ulcerative Colitis Father     Autoimmune Disease Father     Heart Disease Father     Hyperlipidemia Father     Mental Illness Father     Interpersonal Violence Sister     Coronary Artery Disease Sister         angioplasty    GERD Sister     Substance Abuse Sister      Depression Sister     Thyroid Disease Sister     Eye Disorder Maternal Grandmother         cataract    Thyroid Disease Maternal Grandmother     Diabetes Maternal Grandfather     Eye Disorder Paternal Grandmother         cataract    Diabetes Paternal Grandmother     Eye Disorder Paternal Grandfather         cataract    Diabetes Paternal Grandfather     Substance Abuse Paternal Grandfather     Eye Disorder Son         ptosis    Depression Son     Anxiety Disorder Son     Heart Disease Paternal Aunt     Diabetes Paternal Aunt     Diabetes Paternal Uncle     Heart Disease Paternal Uncle     Depression Nephew     Anxiety Disorder Nephew     Thyroid Disease Nephew     Diabetes Type 2  Cousin         paternal cousin    Autoimmune Disease Cousin     Autoimmune Disease Sister     Depression Sister     Mental Illness Sister     Substance Abuse Sister     Thyroid Disease Sister     Depression Son     Mental Illness Son     Thyroid Disease Nephew      Social History   Social History     Tobacco Use    Smoking status: Former     Packs/day: 0.50     Years: 6.00     Additional pack years: 0.00     Total pack years: 3.00     Types: Cigarettes     Start date: 1985     Quit date: 1992     Years since quittin.8    Tobacco comments:     no 2nd hand   Vaping Use    Vaping Use: Some days    Substances: THC, CBD   Substance Use Topics    Alcohol use: Not Currently     Alcohol/week: 3.0 - 6.0 standard drinks of alcohol     Types: 1 - 2 Glasses of wine, 1 - 2 Cans of beer, 1 - 2 Shots of liquor per week     Comment: none since IGG    Drug use: No     Comment: medical canabis tincture and vape      Past medical history, past surgical history, medications, allergies, family history, and social history were reviewed with the patient. No additional pertinent items.      A complete review of systems was performed with pertinent positives and negatives noted in the HPI, and all other systems negative.    Physical Exam   BP:  "134/83  Pulse: 99  Temp: 98.3  F (36.8  C)  Resp: 16  Height: 172.7 cm (5' 8\")  Weight: 56.7 kg (125 lb)  SpO2: 96 %  Physical Exam  Constitutional:       General: She is not in acute distress.     Appearance: She is well-developed. She is not ill-appearing, toxic-appearing or diaphoretic.   HENT:      Head: Normocephalic and atraumatic.   Cardiovascular:      Rate and Rhythm: Normal rate and regular rhythm.      Heart sounds: Normal heart sounds.   Pulmonary:      Effort: Pulmonary effort is normal. No respiratory distress.      Breath sounds: Normal breath sounds.   Abdominal:      General: There is no distension.      Palpations: Abdomen is soft.      Tenderness: There is abdominal tenderness in the right upper quadrant and left lower quadrant. There is no guarding or rebound.      Comments: G-tube used for suctioning.  J-tube with continuous feeds going.  Normal bowel sounds throughout her abdomen.  Normal bowel sounds in the left pelvis and left lower region.  Abdomen is soft.   Musculoskeletal:         General: No tenderness.      Cervical back: Normal range of motion.   Skin:     General: Skin is warm and dry.   Neurological:      Mental Status: She is alert and oriented to person, place, and time.   Psychiatric:         Behavior: Behavior normal.         Thought Content: Thought content normal.           ED Course, Procedures, & Data      Procedures       Results for orders placed or performed during the hospital encounter of 11/08/23   CT Abdomen Pelvis w Contrast     Status: None    Narrative    EXAM: CT ABDOMEN AND PELVIS WITH CONTRAST  LOCATION: [facility]  DATE/TIME: 11/8/2023 10:03 PM CST    INDICATION: Abdominal pain, nausea and vomiting.  COMPARISON:   06/05/2023 - MRCP.  05/09/2023 - CT abdomen and pelvis.    TECHNIQUE: CT scan of the abdomen and pelvis was performed following injection of IV contrast. Multiplanar reformats were obtained. Dose reduction " techniques were used.  CONTRAST: 77 mL Isovue-370.    FINDINGS:    LOWER CHEST: Tiny calcified granuloma in the left lower lobe.    HEPATOBILIARY: Prior cholecystectomy.    SPLEEN: Absent.    PANCREAS: Not visualized, likely relating to prior pancreatectomy.    ADRENAL GLANDS: Unremarkable.    KIDNEYS/BLADDER: The urinary bladder is mildly distended.    BOWEL: A percutaneous gastrostomy tube is present with distal tip in the gastric antrum. A percutaneous jejunostomy tube is present with distal tip in the left hemiabdomen. The stomach, small and large bowel are nondistended. The appendix is not   visualized. No bowel wall thickening, pneumatosis or free intraperitoneal gas.    LYMPH NODES: Unremarkable.    PELVIC ORGANS: No acute findings.    MUSCULOSKELETAL: No acute findings.    OTHER: None.      Impression    IMPRESSION: No acute abnormality identified in the abdomen or pelvis.    Comprehensive metabolic panel     Status: Abnormal   Result Value Ref Range    Sodium 139 135 - 145 mmol/L    Potassium 4.1 3.4 - 5.3 mmol/L    Carbon Dioxide (CO2) 25 22 - 29 mmol/L    Anion Gap 10 7 - 15 mmol/L    Urea Nitrogen 14.0 6.0 - 20.0 mg/dL    Creatinine 0.51 0.51 - 0.95 mg/dL    GFR Estimate >90 >60 mL/min/1.73m2    Calcium 9.1 8.6 - 10.0 mg/dL    Chloride 104 98 - 107 mmol/L    Glucose 147 (H) 70 - 99 mg/dL    Alkaline Phosphatase 106 (H) 35 - 104 U/L    AST 33 0 - 45 U/L    ALT 45 0 - 50 U/L    Protein Total 6.9 6.4 - 8.3 g/dL    Albumin 4.3 3.5 - 5.2 g/dL    Bilirubin Total 0.6 <=1.2 mg/dL   Lactic acid whole blood     Status: Abnormal   Result Value Ref Range    Lactic Acid 0.6 (L) 0.7 - 2.0 mmol/L   UA with Microscopic reflex to Culture     Status: Abnormal    Specimen: Urine, Clean Catch   Result Value Ref Range    Color Urine Light Yellow Colorless, Straw, Light Yellow, Yellow    Appearance Urine Clear Clear    Glucose Urine Negative Negative mg/dL    Bilirubin Urine Negative Negative    Ketones Urine Negative  Negative mg/dL    Specific Gravity Urine 1.012 1.003 - 1.035    Blood Urine Negative Negative    pH Urine 6.0 5.0 - 7.0    Protein Albumin Urine Negative Negative mg/dL    Urobilinogen Urine Normal Normal, 2.0 mg/dL    Nitrite Urine Negative Negative    Leukocyte Esterase Urine Negative Negative    Mucus Urine Present (A) None Seen /LPF    RBC Urine 1 <=2 /HPF    WBC Urine <1 <=5 /HPF    Squamous Epithelials Urine <1 <=1 /HPF    Narrative    Urine Culture not indicated   INR     Status: Normal   Result Value Ref Range    INR 1.11 0.85 - 1.15   CBC with platelets and differential     Status: Abnormal   Result Value Ref Range    WBC Count 9.7 4.0 - 11.0 10e3/uL    RBC Count 3.80 3.80 - 5.20 10e6/uL    Hemoglobin 11.6 (L) 11.7 - 15.7 g/dL    Hematocrit 36.9 35.0 - 47.0 %    MCV 97 78 - 100 fL    MCH 30.5 26.5 - 33.0 pg    MCHC 31.4 (L) 31.5 - 36.5 g/dL    RDW 13.8 10.0 - 15.0 %    Platelet Count 299 150 - 450 10e3/uL    % Neutrophils 72 %    % Lymphocytes 14 %    % Monocytes 11 %    % Eosinophils 2 %    % Basophils 1 %    % Immature Granulocytes 0 %    NRBCs per 100 WBC 0 <1 /100    Absolute Neutrophils 7.0 1.6 - 8.3 10e3/uL    Absolute Lymphocytes 1.3 0.8 - 5.3 10e3/uL    Absolute Monocytes 1.1 0.0 - 1.3 10e3/uL    Absolute Eosinophils 0.2 0.0 - 0.7 10e3/uL    Absolute Basophils 0.1 0.0 - 0.2 10e3/uL    Absolute Immature Granulocytes 0.0 <=0.4 10e3/uL    Absolute NRBCs 0.0 10e3/uL   CBC with platelets differential     Status: Abnormal    Narrative    The following orders were created for panel order CBC with platelets differential.  Procedure                               Abnormality         Status                     ---------                               -----------         ------                     CBC with platelets and d...[[id number]]  Abnormal            Final result                 Please view results for these tests on the individual orders.     Medications   promethazine (PHENERGAN) 25 mg in sodium chloride  0.9 % 55 mL intermittent infusion (25 mg Intravenous $Given 11/9/23 0032)   Baclofen (LIORESAL) tablet 5 mg (has no administration in time range)   lactated ringers BOLUS 1,000 mL (0 mLs Intravenous Stopped 11/8/23 2122)   iopamidol (ISOVUE-370) solution 77 mL (77 mLs Intravenous $Given 11/8/23 2143)   sodium chloride (PF) 0.9% PF flush 69 mL (69 mLs Intravenous $Given 11/8/23 2143)   HYDROmorphone (PF) (DILAUDID) injection 0.5 mg (0.5 mg Intravenous $Given 11/9/23 0003)   acetaminophen (TYLENOL) solution 650 mg (650 mg Oral $Given 11/9/23 0003)                    No results found for any visits on 11/08/23.  Medications - No data to display  Labs Ordered and Resulted from Time of ED Arrival to Time of ED Departure - No data to display  No orders to display          Critical care was not performed.     Medical Decision Making  The patient's presentation was of high complexity (an acute health issue posing potential threat to life or bodily function).    The patient's evaluation involved:  review of external note(s) from 3+ sources (see separate area of note for details)    The patient's management necessitated high risk (a decision regarding hospitalization).    Assessment & Plan    Patient is a 58-year-old female the history of multiple abdominal surgeries as well as history of pancreatectomy with auto islet cell transplant who presents to the ER due to concern for small bowel obstruction.  Patient says that she has been having worsening abdominal pain and vomiting.  Patient here has normal bowel sounds.  Patient has no acute tenderness.  Patient is doing continuous feeds through her J port currently.  Patient also has a port in her right upper chest that she does fluids through at home.  Plan will be to check labs urine and a CT abdomen and pelvis.  Patient's medical chart including previous admission and male clinic was reviewed.  Patient's previous clinic visits were reviewed.  Patient's prior blood culture  results were reviewed.    Patient CT abdomen pelvis is normal.  Patient's labs are normal.  Patient's port is unable to be accessed accessed due to a blister that is popped off the top.  Patient is however not feeling well.  Patient does not feel that she can go home.  Patient's been having ongoing nausea here in the ER.  Plan will be to admit to observation for further care.  Report was given to the hospitalist for further treatment    I have reviewed the nursing notes. I have reviewed the findings, diagnosis, plan and need for follow up with the patient.    New Prescriptions    No medications on file       Final diagnoses:   Abdominal pain, unspecified abdominal location   Nausea and vomiting, unspecified vomiting type         [facility] EMERGENCY DEPARTMENT  11/8/2023     Starla Garibay MD  11/09/23 0239

## 2023-11-09 NOTE — H&P
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     Intractable nausea/vomiting   Abdominal Pain   Gastroparesis    Follows with Dr. Mandeep Park with GI, and [facility]. 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 Currently on tube feeds for 12hrs/day and IV fluids for which she has a port. CT A/P without acute process. Received 1L LR bolus in ED.   -Fleet enemas prn   -Continue scopolamine patch   -Dilaudid 0.3mg q3prn   -Continue PTA IV phenergan   -Continue Creon with meals   -Hold tube feeds   -Advance diet as tolerated   -G-tube to gravity   -Can consider another LR bolus as patient reports significantly decreased PO intake   -Compazine for nausea  -Can consider GI consult if no symptomatic relief      Bacteremia   H/o CLABSI Klebsiella pneumoniae bacteremia x2   Blood cultures growing gram negative bacilli. Was previously admitted twice for Klebsiella bacteremia, first 6/23/23 requiring 14-day course of ertapenem. Was then admitted to [place] for TPN-associated CLABSI Klebsiella pneumoniae infection leading to septic shock. Blood cultures positive on 10/9, first negative 10/11. Received  ceftriaxone w/ antibiotic lock therapy to save port. Patient followed with infectious disease who stated that port site may be source of re-seeding bloodstream. Also has history of direct jejunostomy site infection. Patient afebrile and without leukocytosis on admission. Source of infection most likely open port site.   -Start ceftriaxone   -Pending urine cultures  -Pending procal   -Can order blood cultures from port site pending assessment of wound   -Pending more discussion with patient about potential removal of port and replacement with PICC     De-accessed port-a-cath  Patient reports a scab over her port site for the past 3 weeks, but port has been de-accessed.   -WOC consult  -Can consider IR consult inpatient or outpatient     Insulin-dependent diabetes mellitus   Patient reports using 7u Lantus daily while on tube feeds.   -Start 3u lantus/day   -LSSI    Hypothyroidism  -Continue PTA levothyroxine        Observation Goals: -tolerating oral intake to maintain hydration, -adequate pain control on oral analgesics, Nurse to notify provider when observation goals have been met and patient is ready for discharge.  Diet: NPO for Medical/Clinical Reasons Except for: Meds    DVT Prophylaxis: Ambulate every shift  Piedra Catheter: Not present  Fluids: None   Lines: PRESENT             Cardiac Monitoring: None  Code Status:  DNR/pre-arrest intubation ok     Clinically Significant Risk Factors Present on Admission                                  Disposition Plan      Expected Discharge Date: 11/10/2023                Patient to be formally staffed in the morning.       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     Patient reports a 2-day history of increased nausea/vomiting and lower abdominal pain preventing her from eating anything. She has had multiple episodes of similar symptoms in the past and patient reports that IV phenergan, LR, fentanyl, and dilaudid do the most to control her pain. She denies cough, chest pain, hematemesis, hematochezia, melena, hematuria, vaginal discharge, vaginal bleeding, but does endorse dysuria. She describes her pain as intense cramping and has tried venting her G tube, but it has not been helping. Fleet enemas provide temporary relief.     In the ED, she was vitally stable and afebrile, tolerating room air. Electrolytes were within normal limits, lactate 0.6, WBC 9.7, Hgb 11.6, plt 299. UA normal. Blood cultures growing gram negative bacilli. CT A/P without acute process. Received 1L LR in ED.        Past Medical History    Past Medical History:   Diagnosis Date    Allergic rhinitis, cause unspecified     Allergy to other foods     strawberries, apples, celeries, alice, watermelon    Arthritis     left knee    Choledocholithiasis     long after cholecystectomy    Chronic abdominal pain     Chronic constipation     Chronic nausea     Chronic pancreatitis (H)     Degeneration of lumbar or lumbosacral intervertebral disc     Esophageal reflux     w/ hiatal hernia    Gastroparesis     Hiatal hernia     History of pituitary adenoma     s/p resection     Hypothyroidism     Migraines     Mild hyperlipidemia     On tube feeding diet     presence of GJ tube    Pancreatic disease     PD stricture, 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 endometriosis growth    APPENDECTOMY       SECTION      COLONOSCOPY      COLONOSCOPY N/A 2023    Procedure: COLONOSCOPY, WITH POLYPECTOMY AND BIOPSY;  Surgeon: Percy Chamorro MD;  [facility]    ENDOSCOPIC INSERTION TUBE GASTROSTOMY N/A 2021    Procedure: Gastrojejonostomy placement with jejunopexy, PEG tube exchange;  Surgeon: Zackery Montoya MD;  [facility]    ENDOSCOPIC RETROGRADE CHOLANGIOPANCREATOGRAM N/A 2015    Procedure: ENDOSCOPIC RETROGRADE CHOLANGIOPANCREATOGRAM;  Surgeon: Mandeep Park MD;  [facility]    ENDOSCOPIC RETROGRADE CHOLANGIOPANCREATOGRAM N/A 2016    Procedure: COMBINED ENDOSCOPIC RETROGRADE CHOLANGIOPANCREATOGRAPHY, PLACE TUBE/STENT;  Surgeon: Mandeep Park MD;  [facility]    ENDOSCOPIC RETROGRADE CHOLANGIOPANCREATOGRAM N/A 3/17/2016    Procedure: COMBINED ENDOSCOPIC RETROGRADE CHOLANGIOPANCREATOGRAPHY, REMOVE FOREIGN BODY OR STENT/TUBE;  Surgeon: Mandeep Park MD;  [facility]    ENDOSCOPIC RETROGRADE CHOLANGIOPANCREATOGRAM N/A 2016    Procedure: COMBINED ENDOSCOPIC RETROGRADE CHOLANGIOPANCREATOGRAPHY, PLACE TUBE/STENT;  Surgeon: Mandeep Park MD;  [facility]    ENDOSCOPIC RETROGRADE CHOLANGIOPANCREATOGRAM N/A 2016    Procedure: COMBINED ENDOSCOPIC RETROGRADE CHOLANGIOPANCREATOGRAPHY, REMOVE FOREIGN BODY OR STENT/TUBE;  Surgeon: Mandeep Park MD;  [facility]     ENDOSCOPIC ULTRASOUND UPPER GASTROINTESTINAL TRACT (GI) N/A 10/3/2016    Procedure: ENDOSCOPIC ULTRASOUND, ESOPHAGOSCOPY / UPPER GASTROINTESTINAL TRACT (GI);  Surgeon: Guru Jose Rodas MD;  [facility]    ENTEROSCOPY SMALL BOWEL N/A 2/3/2022    Procedure: ENTEROSCOPY with possible fistula closure;  Surgeon: Francisco Dodson MD;  Location:  GI    ESOPHAGOSCOPY, GASTROSCOPY, DUODENOSCOPY (EGD), COMBINED N/A 6/24/2015    Procedure: COMBINED ESOPHAGOSCOPY, GASTROSCOPY, DUODENOSCOPY (EGD), REMOVE FOREIGN BODY;  Surgeon: Mandeep Park MD;  [facility]    ESOPHAGOSCOPY, GASTROSCOPY, DUODENOSCOPY (EGD), COMBINED N/A 10/25/2015    Procedure: COMBINED ESOPHAGOSCOPY, GASTROSCOPY, DUODENOSCOPY (EGD);  Surgeon: Sammy Amaro MD;  [facility]    ESOPHAGOSCOPY, GASTROSCOPY, DUODENOSCOPY (EGD), COMBINED N/A 10/25/2015    Procedure: COMBINED ESOPHAGOSCOPY, GASTROSCOPY, DUODENOSCOPY (EGD), BIOPSY SINGLE OR MULTIPLE;  Surgeon: Sammy Amaro MD;  [facility]    ESOPHAGOSCOPY, GASTROSCOPY, DUODENOSCOPY (EGD), COMBINED N/A 1/31/2023    Procedure: ESOPHAGOGASTRODUODENOSCOPY (EGD) with peg replacement ;  Surgeon: Mandeep Park MD;  [facility]    ESOPHAGOSCOPY, GASTROSCOPY, DUODENOSCOPY (EGD), DILATATION, COMBINED      EXCISE LESION TRUNK N/A 4/17/2017    Procedure: EXCISE LESION TRUNK;  Removal of Abdominal Foreign Body;  Surgeon: Nestor Phoenix MD;  [facility]    HC ESOPH/GAS REFLUX TEST W NASAL IMPED >1 HR N/A 11/19/2015    Procedure: ESOPHAGEAL IMPEDENCE FUNCTION TEST WITH 24 HOUR PH GREATER THAN 1 HOUR;  Surgeon: Thiago Apple MD;  [facility]    HC UGI ENDOSCOPY DIAG W BIOPSY  9/17/08    HC UGI ENDOSCOPY DIAG W BIOPSY  9/27/12    HC UGI ENDOSCOPY W ESOPHAGEAL DILATION BALLOON <30MM  9/17/08    HC UGI ENDOSCOPY W EUS N/A 5/5/2015    Procedure: COMBINED ENDOSCOPIC ULTRASOUND, ESOPHAGOSCOPY, GASTROSCOPY, DUODENOSCOPY (EGD);  Surgeon: Spenser  Wm Berg MD;  [facility]    HC WRIST ARTHROSCOP,RELEASE XVERS LIG Bilateral 12/17/08    INJECT TRANSVERSUS ABDOMINIS PLANE (TAP) BLOCK BILATERAL Left 9/22/2016    Procedure: INJECT TRANSVERSUS ABDOMINIS PLANE (TAP) BLOCK BILATERAL;  Surgeon: Dickson Corrigan MD;  [facility]    INJECT TRIGGER POINT Bilateral 9/8/2022    Procedure: Abdominal trigger point injection with ultrasound;  Surgeon: Monika Mahajan MD;  [facility]    INJECT TRIGGER POINT SINGLE / MULTIPLE 3 OR MORE MUSCLES Right 11/15/2022    Procedure: Trigger point injections right abdomen with ultrasound guidance;  Surgeon: Monika Mahajan MD;  [facility]: [facility]    IR CHEST PORT PLACEMENT < 5 YRS OF AGE  6/10/2022    laparoscopic pineda  1995    LAPAROSCOPIC HERNIORRHAPHY INCISIONAL N/A 8/23/2018    Procedure: LAPAROSCOPIC HERNIORRHAPHY INCISIONAL;  Laparoscopic Incisional Hernia Repair with Symbotex Mesh Implant;  Surgeon: Nestor Phoenix MD;  [facility]    LAPAROSCOPIC PANCREATECTOMY, TRANSPLANT AUTO ISLET CELL N/A 12/28/2016    Procedure: LAPAROSCOPIC PANCREATECTOMY, TRANSPLANT AUTO ISLET CELL;  Surgeon: Nestor Phoenix MD;  [facility]    LAPAROTOMY EXPLORATORY N/A 1/31/2023    Procedure: Exploratory Laparotomy, lysis of adhesions, Perforated J-Junostomy Resection, Replace J-Junostomy site;  Surgeon: Elver Bauer MD;  [facility]    LAPAROTOMY, LYSIS ADHESIONS, COMBINED N/A 1/31/2023    Procedure: Dilatation of jejunostomy feeding tube, track with placement of jejunostomy tube with fluoroscopy;  Surgeon: Elver Bauer MD;  [facility]    REMOVE AND REPLACE BREAST IMPLANT PROSTHESIS N/A 12/30/2022    Procedure: Exploratory Laparotomy, lysis of adhesions, small bowel resection. Placement of gastric jejunostomy for enteral feeding.;  Surgeon: Elver Bauer MD;  [facility]    REMOVE GASTROSTOMY TUBE ADULT N/A 1/28/2022    Procedure: REMOVAL, GASTROSTOMY TUBE, ADULT;  Surgeon:  Mandeep Park MD;  [facility]    REPLACE GASTROJEJUNOSTOMY TUBE, PERCUTANEOUS N/A 2021    Procedure: GASTROJEJUNOSTOMY TUBE PLACEMENT, PERCUTANEOUS, WITH GASTROPEXY;  Surgeon: Mandeep Park MD;  [facility]    REPLACE GASTROJEJUNOSTOMY TUBE, PERCUTANEOUS N/A 2021    Procedure: REPLACEMENT, GASTROJEJUNOSTOMY TUBE, PERCUTANEOUS;  Surgeon: Zackery Montoya MD;  [facility]    REPLACE GASTROJEJUNOSTOMY TUBE, PERCUTANEOUS N/A 2022    Procedure: REPLACEMENT, GASTROJEJUNOSTOMY TUBE, PERCUTANEOUS;  Surgeon: Mandeep Park MD;  [facility]    REPLACE GASTROJEJUNOSTOMY TUBE, PERCUTANEOUS N/A 2022    Procedure: REPLACEMENT, GASTROJEJUNOSTOMY TUBE, PERCUTANEOUS with ENDOSCOPIC SUTURING.;  Surgeon: Mandeep Park MD;  [facility]    REPLACE GASTROJEJUNOSTOMY TUBE, PERCUTANEOUS N/A 2023    Procedure: Replace Gastrojejunostomy Tube, Percutaneous;  Surgeon: Mandeep Park MD;  [facility]    REPLACE JEJUNOSTOMY TUBE, PERCUTANEOUS N/A 9/10/2021    Procedure: UPPER ENDOSCOPY, REPLACEMENT OF PERCUTANEOUS GASTROJEJUNOSTOMY TUBE, T-TAG GASTROPEXY;  Surgeon: Zackery Montoya MD;  [facility]    REPLACE JEJUNOSTOMY TUBE, PERCUTANEOUS N/A 2021    Procedure: REPLACEMENT, JEJUNOSTOMY TUBE, PERCUTANEOUS;  Surgeon: Mandeep Park MD;  [facility]    REPLACE JEJUNOSTOMY TUBE, PERCUTANEOUS N/A 2023    Procedure: REPLACEMENT, JEJUNOSTOMY TUBE, PERCUTANEOUS;  Surgeon: Mandeep Park MD;  [facility]    REPLACE JEJUNOSTOMY TUBE, PERCUTANEOUS  2023    Procedure: Replace Jejunostomy Tube, Percutaneous;  Surgeon: Mandeep Park MD;  [facility]    transphenoidal pituitary resection      ZZC  DELIVERY ONLY      ZZC  DELIVERY ONLY      repeat c section with incidental cystotomy with repair    ZZC EXCIS PITUITARY,TRANSNASAL/SEPTAL      pituitary tumor removed for  diabetes insipidus    ZZC TOTAL ABDOM HYSTERECTOMY      w/ bilateral salpingoophorectomy        Prior to Admission Medications   Prior to Admission Medications   Prescriptions Last Dose Informant Patient Reported? Taking?   Baclofen (LIORESAL) 5 MG tablet   Yes No   Sig: Take 5-10 mg by mouth 2 times daily   Buprenorphine HCl (BELBUCA) 150 MCG FILM buccal film   Yes No   Sig: Place 150 mcg inside cheek every 12 hours   Continuous Blood Gluc Sensor (FREESTYLE LISA 3 SENSOR) [facility] [place]   No No   Si each every 14 days   Digestive Enzyme Cartridge (RELIZORB) MARCO   No No   Si Cartridges daily   Insulin Glargine-yfgn (SEMGLEE, YFGN,) 100 UNIT/ML SOPN   No No   Sig: Inject 3 Units Subcutaneous every 24 hours   Nutritional Supplements (BOOST HIGH PROTEIN) LIQD   No No   Sig: After above baseline labs are drawn, give: 6 mL/kg to maximum of 360 mL; the beverage is to be consumed within 5 minutes.   STATIN NOT PRESCRIBED (INTENTIONAL)   Yes No   Sig: Previous liver issues, risks vs benefits felt to not warrant statin.    Discussed Oct 2022 visit   Sharps Container (BD SHARPS ) MISC   No No   Si Container as needed   UNABLE TO FIND   Yes No   Sig: Medical cannabis. Take 1 lozenge 4 MCT to 1 THC by mouth daily for pain/Nausea   ZOLMitriptan (ZOMIG-ZMT) 5 MG ODT   No No   Sig: Take 1 tablet (5 mg) by mouth at onset of headache for migraine Dissolve tablet in the mouth. May repeat in 2 hours. Max 2 tablets/24 hours.   acetaminophen (TYLENOL) 325 MG/10.15ML solution   No No   Si.3 mLs (650 mg) by Per J Tube route every 6 hours as needed for mild pain or fever   amitriptyline (ELAVIL) 10 MG tablet   No No   Sig: Take 4 tablets (40 mg) by mouth At Bedtime   amylase-lipase-protease (CREON) 93423-26222 units CPEP per EC capsule   No No   Sig: Take 3-4 capsules by mouth 3 times daily (with meals) With oral meals   blood glucose (PAT CONTOUR NEXT) test strip   No No   Sig: Use to test blood sugar 6 times daily  "or as directed.   blood glucose (NO BRAND SPECIFIED) test strip   No No   Sig: Use to test blood sugar 6-8 times daily or as directed.   blood glucose monitoring (PAT MICROLET) lancets   No No   Sig: Use to test blood sugar 6-8 times daily or as directed.   cetirizine (ZYRTEC) 10 MG tablet   No No   Si tablet (10 mg) by Per G Tube route daily   diphenhydrAMINE (BENADRYL) 12.5 MG/5ML solution   Yes No   Sig: Take 25 mg by mouth 4 times daily as needed for other (nausea)   estradiol (VAGIFEM) 10 MCG TABS vaginal tablet   No No   Sig: INSERT 1 TABLET(10 MCG) VAGINALLY 2 TIMES A WEEK   Patient taking differently: INSERT 1 TABLET(10 MCG) VAGINALLY every third day   famotidine (PEPCID) 40 MG/5ML suspension   No No   Si.5 mLs (20 mg) by Per J Tube route daily   fluticasone (FLONASE) 50 MCG/ACT nasal spray   Yes No   Sig: Spray 1 spray into both nostrils daily   glucagon 1 MG kit   No No   Sig: Give 0.1 to 0.15mg( 10-15 units on Insulin sryinge) subcutaneous  every 15 minutes PRN for hypoglycemia. Remix new kit q24hr. Needs up to 3 kit/week.   glucose 40 % GEL gel   No No   Sig: Take 15-30 g by mouth every 15 minutes as needed for low blood sugar   insulin syringe-needle U-100 (30G X 1/2\" 0.3 ML) 30G X 1/2\" 0.3 ML miscellaneous   No No   Sig: Use 3 syringes daily or as directed.   levothyroxine (SYNTHROID/LEVOTHROID) 137 MCG tablet   Yes No   Si mcg by Per G Tube route daily   lidocaine (LIDODERM) 5 % patch   Yes No   Sig: Place onto the skin every 24 hours To prevent lidocaine toxicity, patient should be patch free for 12 hrs daily.   methocarbamol (ROBAXIN) 750 MG tablet   No No   Sig: Take 1 tablet (750 mg) by mouth 4 times daily as needed for muscle spasms   montelukast (SINGULAIR) 10 MG tablet   Yes No   Sig: 10 mg by Per G Tube route At Bedtime   pantoprazole (PROTONIX) 40 mg IV push injection   No No   Sig: Inject 40 mg into the vein daily (with breakfast)   prochlorperazine (COMPAZINE) 10 MG tablet   " "Yes No   Sig: Take 10 mg by mouth every 6 hours as needed for nausea or vomiting   promethazine 25 mg   Yes No   Sig: Inject 25 mg into the vein daily as needed for nausea   prucalopride succinate (MOTEGRITY) 2 MG tablet   Yes No   Sig: Take 1 tablet (2 mg) by mouth daily Per G-tube   scopolamine (TRANSDERM) 1 MG/3DAYS 72 hr patch   No No   Sig: Place 1 patch onto the skin every 72 hours   sennosides (SENOKOT) 8.8 MG/5ML syrup   No No   Si mLs by Per J Tube route 2 times daily   silver nitrate (ARZOL SILVER NIT APPLICATORS) 75-25 % miscellaneous   No No   Sig: Apply topically as needed for wound care Apply Silver Nitrate Sticks every 2-3 days until granulation tissue resolved.  \"Roll\" tip of Silver Nitrate Q tip directly onto the granulation tissue-bulging tissue area.  Have Agency or Home Care Nurse administer this, if you prefer.  Take care not to touch any healthy tissue or skin.  The tissue will turn white and eventually black & scab off.  May repeat if needed in the future for recurrence.   thiamine (B-1) 100 MG tablet   No No   Si tablet (100 mg) by Per J Tube route daily   zinc oxide - white petrolatum (CRITIC-AID THICK MOIST BARRIER) 20-51% PSTE topical paste   No No   Sig: Apply 71 g topically every hour as needed for rash (Under J tube bumper when needed for skin protection)      Facility-Administered Medications Last Administration Doses Remaining   Botulinum Toxin Type A (BOTOX) 200 units injection 155 Units 2023  1:23 PM    alteplase (CATHFLO ACTIVASE) injection 2 mg None recorded 1           Review of Systems    The 10 point Review of Systems is negative other than noted in the HPI or here.      Physical Exam   Vital Signs: Temp: 98.3  F (36.8  C) Temp src: Oral BP: 134/83 Pulse: 99   Resp: 16 SpO2: 96 % O2 Device: None (Room air)    Weight: 125 lbs 0 oz    General Appearance: Awake, alert and oriented, in moderate distress   Respiratory: clear to auscultation bilaterally   Cardiovascular: " regular rate and rhythm, no murmurs/rubs/gallops  GI: diffuse tenderness to exam, non-distended   Skin: G-tube in place without erythema or drainage, J-tube in place without erythema or drainage; port de-accessed with scab     Medical Decision Making       Please see A&P for additional details of medical decision making.      Data     I have personally reviewed the following data over the past 24 hrs:    9.7  \   11.6 (L)   / 299     139 104 14.0 /  147 (H)   4.1 25 0.51 \     ALT: 45 AST: 33 AP: 106 (H) TBILI: 0.6   ALB: 4.3 TOT PROTEIN: 6.9 LIPASE: N/A     Procal: N/A CRP: N/A Lactic Acid: 0.6 (L)       INR:  1.11 PTT:  N/A   D-dimer:  N/A Fibrinogen:  N/A       Imaging results reviewed over the past 24 hrs:   Recent Results (from the past 24 hour(s))   CT Abdomen Pelvis w Contrast    Narrative    EXAM: CT ABDOMEN AND PELVIS WITH CONTRAST  LOCATION: [facility]  DATE/TIME: 11/8/2023 10:03 PM CST    INDICATION: Abdominal pain, nausea and vomiting.  COMPARISON:   06/05/2023 - MRCP.  05/09/2023 - CT abdomen and pelvis.    TECHNIQUE: CT scan of the abdomen and pelvis was performed following injection of IV contrast. Multiplanar reformats were obtained. Dose reduction techniques were used.  CONTRAST: 77 mL [id number].    FINDINGS:    LOWER CHEST: Tiny calcified granuloma in the left lower lobe.    HEPATOBILIARY: Prior cholecystectomy.    SPLEEN: Absent.    PANCREAS: Not visualized, likely relating to prior pancreatectomy.    ADRENAL GLANDS: Unremarkable.    KIDNEYS/BLADDER: The urinary bladder is mildly distended.    BOWEL: A percutaneous gastrostomy tube is present with distal tip in the gastric antrum. A percutaneous jejunostomy tube is present with distal tip in the left hemiabdomen. The stomach, small and large bowel are nondistended. The appendix is not   visualized. No bowel wall thickening, pneumatosis or free intraperitoneal gas.    LYMPH NODES:  Unremarkable.    PELVIC ORGANS: No acute findings.    MUSCULOSKELETAL: No acute findings.    OTHER: None.      Impression    IMPRESSION: No acute abnormality identified in the abdomen or pelvis.

## 2023-11-09 NOTE — CONSULTS
[facility] ID Service: Initial Consultation     Patient:  Dinora Mcghee, Date of birth 1965, Medical record number [id number]  Date of Visit:  November 9, 2023  Consult Requested by: Ronald Omer,Petrona         Assessment and Recommendations:   Problem List:  Klebsiella pneumonia bacteremia  Blood cultures in 4/4 bottles on 11/8/2023 Klebsiella pneumonia  Blood culture from right Port-A-Cath on 11/9/2023 pending  Right upper chest Port-A-Cath removed 11/9/2023  History of prior Pan-Sensitive Klebsiella pneumonia bacteremias  Admission @ [facility] 6/4/23-6/10/2023: Treated with ertapenem 1 g daily IV x14 days (6/7/2023 - 6/21/2023)  Admission @ [facility] 10/9/2023 - 10/13/2023: Patient did not want Port-A-Cath removed so salvage therapy with ceftriaxone 2 g daily IV with line lock x14 days (10/10/2023 - 10/24/2023)    Recommendations:  Continue ceftriaxone 2 g daily IV pending sensitivities  Please draw 2 new sets of blood cultures today  Consider consult to GI for G/J-tube evaluation    Discussion:  Ms Mcghee is a 58-year-old female with an extensive abdominal surgery history including chronic pancreatitis due to gallstones s/p TPIAT 12/2016, gastroparesis, choledochoduodenostomy, duodenojejunostomy, splenectomy, several bowel obstructions, s/p right upper chest Port-A-Cath for IVF/IV meds, J-tube for tube feeds nightly and G-tube for venting and 2 prior episodes of Klebsiella pneumonia bacteremia (6/7/23 treated with ertapenem 1 g daily IV x14 days and 10/9/2023 treated with ceftriaxone 2 g daily IV + line lock x14 days). She was initially admitted to [facility] on 11/8/2023 for abdominal discomfort, fevers and rigors where she was found to have recurrent Klebsiella pneumonia bacteremia.  She is s/p right upper chest Port-A-Cath removal.  Currently on ceftriaxone 2 g daily IV with repeat blood cultures pending    Thank you for the consult. ID will continue to follow    Discussed with ID  Attending, Dr Gen Shoemaker DO, PGY 4  Infectious Disease Fellow  [facility]  Pager: [phone]        History of Present Illness:     Ms Mcghee is a 58-year-old female with a complicated abdominal medical history including chronic pancreatitis s/p TPIAT 12/2016, gastroparesis, choledochoduodenostomy, duodenojejunostomy, splenectomy, numerous bowel obstructions, s/p right upper chest Port-A-Cath for IVF/IV meds, J-tube for tube feeds nightly and G-tube for venting followed by Dr. Park.  She also has a history of 2 prior Klebsiella pneumonia bacteremia where she was treated with ertapenem 1 g daily IV x14 days (6/7/2023 - 6/21/2023) and ceftriaxone 2 g daily IV with line lock x14 days (10/10/2023 - 10/20/2023).  She was advised that if she were to have any recurrent bacteremia, she will need to have her right Port-A-Cath removed as this was the suspected etiology of her last Klebsiella pneumonia bacteremia on 10/10/2023.  Starting at the end of October 2023, she began experiencing progressively worsening abdominal discomfort, nausea and emesis of clear/white content followed by dry heaving.  Starting a couple days ago, she began feeling warm with chills and rigors.  She called the office of her gastroenterologist and given her progressively worsening symptoms, she was advised by the clinic on-call staff to come to [facility] ER on 11/9/2023.  On admission, she is hemodynamically stable.  Initial labs largely unremarkable including creatinine 0.5, procalcitonin 0.4, WBC 9.7 and urinalysis with few bacteria. CT abdomen pelvis W without any abscess, colitis or free air.  Blood cultures were drawn in the ER which eventually grew Klebsiella pneumonia in 4/4 bottles.  Ceftriaxone 2 g daily IV was initiated followed by ID consult.    On my evaluation of the patient this afternoon, she is sitting on the hallway bed in the ER accompanied by her .  She reports ongoing abdominal pain, nausea,  but no further feelings of warmth or rigors.  She was updated with the plans to await for Port-A-Cath removal and continuation of antibiotics.  No further questions by her or her .         Review of Systems:   Full 10 point ROS obtained, pertinent positives and negatives as above.       Past Medical History:     Past Medical History:   Diagnosis Date    Allergic rhinitis, cause unspecified     Allergy to other foods     strawberries, apples, celeries, alice, watermelon    Arthritis     left knee    Choledocholithiasis     long after cholecystectomy    Chronic abdominal pain     Chronic constipation     Chronic nausea     Chronic pancreatitis (H)     Degeneration of lumbar or lumbosacral intervertebral disc     Esophageal reflux     w/ hiatal hernia    Gastroparesis     Hiatal hernia     History of pituitary adenoma     s/p resection    Hypothyroidism     Migraines     Mild hyperlipidemia     On tube feeding diet     presence of GJ tube    Pancreatic disease     PD stricture, 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 endometriosis growth    APPENDECTOMY       SECTION      COLONOSCOPY      COLONOSCOPY N/A 2023    Procedure: COLONOSCOPY, WITH POLYPECTOMY AND BIOPSY;  Surgeon: Percy Chamorro MD;  [facility]    ENDOSCOPIC INSERTION TUBE GASTROSTOMY N/A 2021    Procedure: Gastrojejonostomy placement with jejunopexy, PEG tube exchange;  Surgeon: Zackery Montoya MD;  [facility]    ENDOSCOPIC RETROGRADE CHOLANGIOPANCREATOGRAM N/A 2015    Procedure: ENDOSCOPIC RETROGRADE CHOLANGIOPANCREATOGRAM;  Surgeon: Mandeep Park MD;  [facility]    ENDOSCOPIC RETROGRADE  CHOLANGIOPANCREATOGRAM N/A 2/9/2016    Procedure: COMBINED ENDOSCOPIC RETROGRADE CHOLANGIOPANCREATOGRAPHY, PLACE TUBE/STENT;  Surgeon: Mandeep Park MD;  [facility]    ENDOSCOPIC RETROGRADE CHOLANGIOPANCREATOGRAM N/A 3/17/2016    Procedure: COMBINED ENDOSCOPIC RETROGRADE CHOLANGIOPANCREATOGRAPHY, REMOVE FOREIGN BODY OR STENT/TUBE;  Surgeon: Mandeep Park MD;  [facility]    ENDOSCOPIC RETROGRADE CHOLANGIOPANCREATOGRAM N/A 8/2/2016    Procedure: COMBINED ENDOSCOPIC RETROGRADE CHOLANGIOPANCREATOGRAPHY, PLACE TUBE/STENT;  Surgeon: Mandeep Park MD;  [facility]    ENDOSCOPIC RETROGRADE CHOLANGIOPANCREATOGRAM N/A 8/26/2016    Procedure: COMBINED ENDOSCOPIC RETROGRADE CHOLANGIOPANCREATOGRAPHY, REMOVE FOREIGN BODY OR STENT/TUBE;  Surgeon: Mandeep Park MD;  [facility]    ENDOSCOPIC ULTRASOUND UPPER GASTROINTESTINAL TRACT (GI) N/A 10/3/2016    Procedure: ENDOSCOPIC ULTRASOUND, ESOPHAGOSCOPY / UPPER GASTROINTESTINAL TRACT (GI);  Surgeon: Guru Jose Rodas MD;  [facility]    ENTEROSCOPY SMALL BOWEL N/A 2/3/2022    Procedure: ENTEROSCOPY with possible fistula closure;  Surgeon: Francisco Dodson MD;  Location: [facility]    ESOPHAGOSCOPY, GASTROSCOPY, DUODENOSCOPY (EGD), COMBINED N/A 6/24/2015    Procedure: COMBINED ESOPHAGOSCOPY, GASTROSCOPY, DUODENOSCOPY (EGD), REMOVE FOREIGN BODY;  Surgeon: Mandeep Park MD;  Location:  GI    ESOPHAGOSCOPY, GASTROSCOPY, DUODENOSCOPY (EGD), COMBINED N/A 10/25/2015    Procedure: COMBINED ESOPHAGOSCOPY, GASTROSCOPY, DUODENOSCOPY (EGD);  Surgeon: Sammy Amaro MD;  Location:  GI    ESOPHAGOSCOPY, GASTROSCOPY, DUODENOSCOPY (EGD), COMBINED N/A 10/25/2015    Procedure: COMBINED ESOPHAGOSCOPY, GASTROSCOPY, DUODENOSCOPY (EGD), BIOPSY SINGLE OR MULTIPLE;  Surgeon: Sammy Amaro MD;  Location:  GI    ESOPHAGOSCOPY, GASTROSCOPY, DUODENOSCOPY (EGD), COMBINED N/A 1/31/2023    Procedure:  ESOPHAGOGASTRODUODENOSCOPY (EGD) with peg replacement ;  Surgeon: Mandeep Park MD;  [facility]    ESOPHAGOSCOPY, GASTROSCOPY, DUODENOSCOPY (EGD), DILATATION, COMBINED      EXCISE LESION TRUNK N/A 4/17/2017    Procedure: EXCISE LESION TRUNK;  Removal of Abdominal Foreign Body;  Surgeon: Nestor Phoenix MD;  [facility]    HC ESOPH/GAS REFLUX TEST W NASAL IMPED >1 HR N/A 11/19/2015    Procedure: ESOPHAGEAL IMPEDENCE FUNCTION TEST WITH 24 HOUR PH GREATER THAN 1 HOUR;  Surgeon: Thiago Apple MD;  [facility]    HC UGI ENDOSCOPY DIAG W BIOPSY  9/17/08    HC UGI ENDOSCOPY DIAG W BIOPSY  9/27/12    HC UGI ENDOSCOPY W ESOPHAGEAL DILATION BALLOON <30MM  9/17/08    HC UGI ENDOSCOPY W EUS N/A 5/5/2015    Procedure: COMBINED ENDOSCOPIC ULTRASOUND, ESOPHAGOSCOPY, GASTROSCOPY, DUODENOSCOPY (EGD);  Surgeon: Wm Dueñas MD;  [facility]    HC WRIST ARTHROSCOP,RELEASE XVERS LIG Bilateral 12/17/08    INJECT TRANSVERSUS ABDOMINIS PLANE (TAP) BLOCK BILATERAL Left 9/22/2016    Procedure: INJECT TRANSVERSUS ABDOMINIS PLANE (TAP) BLOCK BILATERAL;  Surgeon: Dickson Corrigan MD;  [facility]    INJECT TRIGGER POINT Bilateral 9/8/2022    Procedure: Abdominal trigger point injection with ultrasound;  Surgeon: Monika Mahajan MD;  [facility]    INJECT TRIGGER POINT SINGLE / MULTIPLE 3 OR MORE MUSCLES Right 11/15/2022    Procedure: Trigger point injections right abdomen with ultrasound guidance;  Surgeon: Monika Mahajan MD;  [facility]    IR CHEST PORT PLACEMENT < 5 YRS OF AGE  6/10/2022    laparoscopic pineda  1995    LAPAROSCOPIC HERNIORRHAPHY INCISIONAL N/A 8/23/2018    Procedure: LAPAROSCOPIC HERNIORRHAPHY INCISIONAL;  Laparoscopic Incisional Hernia Repair with Symbotex Mesh Implant;  Surgeon: Nestor Phoenix MD;  [facility]    LAPAROSCOPIC PANCREATECTOMY, TRANSPLANT AUTO ISLET CELL N/A 12/28/2016    Procedure: LAPAROSCOPIC PANCREATECTOMY, TRANSPLANT AUTO ISLET CELL;  Surgeon:  Nestor Phoenix MD;  [facility]    LAPAROTOMY EXPLORATORY N/A 1/31/2023    Procedure: Exploratory Laparotomy, lysis of adhesions, Perforated J-Junostomy Resection, Replace J-Junostomy site;  Surgeon: Elver Bauer MD;  [facility]    LAPAROTOMY, LYSIS ADHESIONS, COMBINED N/A 1/31/2023    Procedure: Dilatation of jejunostomy feeding tube, track with placement of jejunostomy tube with fluoroscopy;  Surgeon: Elver Bauer MD;  [facility]    REMOVE AND REPLACE BREAST IMPLANT PROSTHESIS N/A 12/30/2022    Procedure: Exploratory Laparotomy, lysis of adhesions, small bowel resection. Placement of gastric jejunostomy for enteral feeding.;  Surgeon: Elver Bauer MD;  [facility]    REMOVE GASTROSTOMY TUBE ADULT N/A 1/28/2022    Procedure: REMOVAL, GASTROSTOMY TUBE, ADULT;  Surgeon: Mandeep Park MD;  [facility]    REPLACE GASTROJEJUNOSTOMY TUBE, PERCUTANEOUS N/A 9/7/2021    Procedure: GASTROJEJUNOSTOMY TUBE PLACEMENT, PERCUTANEOUS, WITH GASTROPEXY;  Surgeon: Mandeep Park MD;  [facility]    REPLACE GASTROJEJUNOSTOMY TUBE, PERCUTANEOUS N/A 9/22/2021    Procedure: REPLACEMENT, GASTROJEJUNOSTOMY TUBE, PERCUTANEOUS;  Surgeon: Zackery Montoya MD;  [facility]    REPLACE GASTROJEJUNOSTOMY TUBE, PERCUTANEOUS N/A 11/22/2022    Procedure: REPLACEMENT, GASTROJEJUNOSTOMY TUBE, PERCUTANEOUS;  Surgeon: Mandeep Park MD;  [facility]    REPLACE GASTROJEJUNOSTOMY TUBE, PERCUTANEOUS N/A 12/9/2022    Procedure: REPLACEMENT, GASTROJEJUNOSTOMY TUBE, PERCUTANEOUS with ENDOSCOPIC SUTURING.;  Surgeon: Mandeep Park MD;  [facility]    REPLACE GASTROJEJUNOSTOMY TUBE, PERCUTANEOUS N/A 8/1/2023    Procedure: Replace Gastrojejunostomy Tube, Percutaneous;  Surgeon: Mandeep Park MD;  [facility]    REPLACE JEJUNOSTOMY TUBE, PERCUTANEOUS N/A 9/10/2021    Procedure: UPPER ENDOSCOPY, REPLACEMENT OF PERCUTANEOUS  GASTROJEJUNOSTOMY TUBE, T-TAG GASTROPEXY;  Surgeon: Zackery Montoya MD;  [facility]    REPLACE JEJUNOSTOMY TUBE, PERCUTANEOUS N/A 2021    Procedure: REPLACEMENT, JEJUNOSTOMY TUBE, PERCUTANEOUS;  Surgeon: Mandeep Park MD;  [facility]    REPLACE JEJUNOSTOMY TUBE, PERCUTANEOUS N/A 2023    Procedure: REPLACEMENT, JEJUNOSTOMY TUBE, PERCUTANEOUS;  Surgeon: Mandeep Park MD;  [facility]    REPLACE JEJUNOSTOMY TUBE, PERCUTANEOUS  2023    Procedure: Replace Jejunostomy Tube, Percutaneous;  Surgeon: Mandeep Park MD;  [facility]    transphenoidal pituitary resection      [facility]  DELIVERY ONLY      [facility]  DELIVERY ONLY      repeat c section with incidental cystotomy with repair    [facility] EXCIS PITUITARY,TRANSNASAL/SEPTAL      pituitary tumor removed for diabetes insipidus    [facility] TOTAL ABDOM HYSTERECTOMY      w/ bilateral salpingoophorectomy          Allergies:      Allergies   Allergen Reactions    Apple Juice Anaphylaxis    Corticosteroids Other (See Comments)     All oral, IV and injectable steroids are contraindicated (unless in life threatening situations) in [facility] transplant recipients. They can cause irreversible loss of islet cell function. Please contact patient's transplant care coordinator ADI Gaffney RN at [phone]/pager [phone] and/or endocrinologist prior to administration.      Depakote [Divalproex Sodium] Other (See Comments)     Chest pain    Zithromax [Azithromycin Dihydrate] Anaphylaxis     Noted in 08 ER    Bromfenac      Other reaction(s): Headache    Codeine      Other reaction(s): Hallucinations    Darvocet [Propoxyphene N-Apap] Itching    Morphine Nausea and Vomiting and Rash    Nalbuphine Hcl Rash     RASH :nubaine    Zosyn [Piperacillin-Tazobactam In Dex] Rash     Possible allergy, did have a diffuse rash that seemed drug related but could have also been related to soap in the hospital.      Bactrim [Sulfamethoxazole W-Trimethoprim] Other (See Comments) and Nausea and Vomiting     Severely low liver function.    Statins Other (See Comments)     Previous liver issues, risks vs benefits felt to not warrant statin.  Discussed Oct 2022 visit    Tape [Adhesive Tape] Blisters    Tramadol     Zofran [Ondansetron] Other (See Comments)     migraine    Flagyl [Metronidazole] Hives and Rash            Current Antimicrobials:     Current:  Ceftriaxone 2 g daily IV (11/9/2023-       Family History:     Family History   Problem Relation Age of Onset    Lipids Mother     Hypertension Mother     Thyroid Disease Mother     Depression Mother     Angina Mother     GERD Mother     Skin Cancer Mother     Migraines Mother     Autoimmune Disease Mother     Hyperlipidemia Mother     Mental Illness Mother     Eye Disorder Father         cataract, detached retina    Myocardial Infarction Father 60    Lipids Father     Cerebrovascular Disease Father     Depression Father     Substance Abuse Father     Anesthesia Reaction Father         stroke right after surgery    Cataracts Father     Osteoarthritis Father     Ulcerative Colitis Father     Autoimmune Disease Father     Heart Disease Father     Hyperlipidemia Father     Mental Illness Father     Interpersonal Violence Sister     Coronary Artery Disease Sister         angioplasty    GERD Sister     Substance Abuse Sister     Depression Sister     Thyroid Disease Sister     Eye Disorder Maternal Grandmother         cataract    Thyroid Disease Maternal Grandmother     Diabetes Maternal Grandfather     Eye Disorder Paternal Grandmother         cataract    Diabetes Paternal Grandmother     Eye Disorder Paternal Grandfather         cataract    Diabetes Paternal Grandfather     Substance Abuse Paternal Grandfather     Eye Disorder Son         ptosis    Depression Son     Anxiety Disorder Son     Heart Disease Paternal Aunt     Diabetes Paternal Aunt     Diabetes Paternal Uncle      Heart Disease Paternal Uncle     Depression Nephew     Anxiety Disorder Nephew     Thyroid Disease Nephew     Diabetes Type 2  Cousin         paternal cousin    Autoimmune Disease Cousin     Autoimmune Disease Sister     Depression Sister     Mental Illness Sister     Substance Abuse Sister     Thyroid Disease Sister     Depression Son     Mental Illness Son     Thyroid Disease Nephew             Social History:     Social History     Socioeconomic History    Marital status:      Spouse name: Norris    Number of children: 3    Years of education: 16    Highest education level: Not on file   Occupational History    Occupation: director     Employer: [employer]   Tobacco Use    Smoking status: Former     Packs/day: 0.50     Years: 6.00     Additional pack years: 0.00     Total pack years: 3.00     Types: Cigarettes     Start date: 1985     Quit date: 1992     Years since quittin.8    Smokeless tobacco: Not on file    Tobacco comments:     no 2nd hand   Vaping Use    Vaping Use: Some days    Substances: THC, CBD   Substance and Sexual Activity    Alcohol use: Not Currently     Alcohol/week: 3.0 - 6.0 standard drinks of alcohol     Types: 1 - 2 Glasses of wine, 1 - 2 Cans of beer, 1 - 2 Shots of liquor per week     Comment: none since IGG    Drug use: No     Comment: medical canabis tincture and vape    Sexual activity: Not Currently     Partners: Male     Birth control/protection: Post-menopausal     Comment: Hystectomy    Other Topics Concern    Parent/sibling w/ CABG, MI or angioplasty before 65F 55M? No     Service Not Asked    Blood Transfusions No    Caffeine Concern Not Asked    Occupational Exposure Not Asked    Hobby Hazards Not Asked    Sleep Concern Not Asked    Stress Concern Not Asked    Weight Concern Not Asked    Special Diet Not Asked    Back Care Not Asked    Exercise Not Asked    Bike Helmet Not Asked    Seat Belt Yes    Self-Exams Not Asked   Social History Narrative      with 3 children and a dog.  No smoking, etoh or drug use.  Worked as a  for [email or web address] in [place] in the past.  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     Social Determinants of Health     Financial Resource Strain: Not on file   Food Insecurity: Not on file   Transportation Needs: Not on file   Physical Activity: Not on file   Stress: Not on file   Social Connections: Not on file   Interpersonal Safety: Not on file   Housing Stability: Not on file              Physical Exam:   Ranges forvital signs:  Temp:  [98.2  F (36.8  C)-98.3  F (36.8  C)] 98.2  F (36.8  C)  Pulse:  [88-99] 88  Resp:  [16-18] 18  BP: (126-134)/(71-83) 126/71  SpO2:  [96 %] 96 %  No intake or output data in the 24 hours ending 11/09/23 1321    Exam:  GENERAL: Chronically ill-appearing, thin   ENT:  Head is normocephalic, atraumatic. Oropharynx is moist without exudates or ulcers.  EYES:  Eyes have anicteric sclerae.    NECK:  Supple.  LUNGS: Slightly diminished breath sounds but clear to auscultation bilaterally  CARDIOVASCULAR:  Regular rate and rhythm with no murmurs, gallops or rubs.  ABDOMEN: Soft.  Tenderness to palpation around G-tube and J-tube.  No guarding, rigidity or rebound tenderness concerning for acute abdomen  EXT: Extremities warm and without edema.  SKIN:  No acute rashes.  Slight erythema overlying right upper chest Port-A-Cath without any active drainage.  No erythema or warmth noted around G-tube sites  NEUROLOGIC:  Grossly nonfocal.         Laboratory Data:     Inflammatory Markers    Recent Labs   Lab Test 09/16/21  1308 12/29/16  0620   SED 10  --    CRP  --  90.0*       Hematology Studies    Recent Labs   Lab Test 11/08/23  2011 08/01/23  0743 06/10/23  0706 06/09/23  0445 06/08/23  0706 06/07/23  0707 07/13/21  1731 12/17/20  0739 09/14/20  1529 01/31/20  1021 09/13/19  1332 04/05/19  1316 01/24/19  0740   WBC 9.7 5.7 8.1 6.5 5.5 4.8   < > 5.0 6.2 4.2 5.2  6.2 5.5   ANEU  --   --   --   --   --   --   --  1.5* 2.6 1.7 2.0 2.5 2.1   AEOS  --   --   --   --   --   --   --  0.3 0.2 0.3 0.3 0.1 0.4   HGB 11.6* 12.5 11.5* 11.0* 11.7 11.6*   < > 13.5 14.3 14.3 13.7 14.0 12.4   MCV 97 96 94 94 95 95   < > 97 98 97 97 94 96    376 356 302 277 228   < > 363 353 337 336 377 399    < > = values in this interval not displayed.       Immune Globulin Studies    Recent Labs   Lab Test 08/05/21  1544 07/20/21  0943 12/17/20  0741 01/31/17  1310 12/19/16  0800    886 924   < >  --    IGA  --   --   --   --  329    < > = values in this interval not displayed.       Metabolic Studies     Recent Labs   Lab Test 11/08/23 2011 08/01/23  0743 06/10/23  0706 06/09/23  0445 06/08/23  0706    139 138 139 139   POTASSIUM 4.1 4.6 4.3 4.1 3.8   CHLORIDE 104 102 103 103 103   CO2 25 27 27 26 27   BUN 14.0 11.5 9.1 11.7 10.1   CR 0.51 0.55 0.54 0.51 0.53   GFRESTIMATED >90 >90 >90 >90 >90       Hepatic Studies    Recent Labs   Lab Test 11/08/23  2011 06/10/23  0706 06/09/23  0445 06/08/23  0706 06/07/23  0707 06/06/23  0549   BILITOTAL 0.6 0.4 0.4 0.4 0.5 0.5   ALKPHOS 106* 162* 154* 174* 197* 210*   ALBUMIN 4.3 3.5 3.3* 3.5 3.6 3.3*   AST 33 40* 31 30 44* 67*   ALT 45 99* 101* 133* 177* 221*       Thyroid Studies    Recent Labs   Lab Test 06/05/23  0703 03/06/23  1548 02/19/19  1651 03/23/18  0958 12/20/16  1121   TSH 0.55 0.99   < > 0.74 0.13*   T4  --   --   --  1.13 1.17    < > = values in this interval not displayed.       Microbiology:  Culture   Date Value Ref Range Status   11/08/2023 Positive on the 1st day of incubation (A)  Preliminary   11/08/2023 Gram negative bacilli (AA)  Preliminary     Comment:     2 of 2 bottles   11/08/2023 Positive on the 1st day of incubation (A)  Preliminary   11/08/2023 Gram negative bacilli (AA)  Preliminary     Comment:     2 of 2 bottles   07/05/2023 No Growth  Final   06/27/2023 No Growth  Final   06/27/2023 No Growth  Final  "  06/23/2023 1+ Klebsiella pneumoniae (A)  Final   06/23/2023 1+ Pseudomonas aeruginosa (A)  Final   06/23/2023 1+ Candida albicans (A)  Final     Comment:     Susceptibilities not routinely done, refer to antibiogram to view typical susceptibility profiles   06/23/2023 1+ Staphylococcus epidermidis (A)  Final     Comment:     Susceptibilities not routinely done, refer to antibiogram to view typical susceptibility profiles   06/06/2023 No Growth  Final   06/06/2023 No Growth  Final   06/06/2023 No Growth  Final   06/05/2023 No Growth  Final   06/05/2023 No Growth  Final   06/04/2023 Positive on the 1st day of incubation (A)  Final   06/04/2023 Klebsiella pneumoniae (AA)  Final     Comment:     2 of 2 bottlesSusceptibilities done on previous cultures   06/04/2023 Positive on the 1st day of incubation (A)  Final   06/04/2023 Klebsiella pneumoniae (AA)  Final     Comment:     2 of 2 bottles   05/10/2023 No Growth  Final   05/04/2023 Candida albicans (A)  Final   12/05/2021 2+ Klebsiella pneumoniae (A)  Final   12/05/2021 2+ Klebsiella pneumoniae (A)  Final   12/05/2021 2+ Citrobacter freundii complex (A)  Final   12/05/2021 3+ Streptococcus anginosus (A)  Final     Comment:     This organism is susceptible to ampicillin, penicillin, vancomycin and the cephalosporins. If treatment is required and your patient is allergic to penicillin, contact the microbiology lab within 5 days to request susceptibility testing.   09/23/2021 No Growth  Final   09/23/2021 No Growth  Final   09/19/2021 >100,000 CFU/mL Mixture of urogenital andry  Final       Urine Studies    Recent Labs   Lab Test 11/08/23  2020 06/04/23  1624 05/10/23  1812 12/19/22  0700 12/03/21  1412 09/19/21  1808   LEUKEST Negative Negative Large* Negative Negative Moderate*   WBCU <1 1 28*  --  <1 10*       Vancomycin Levels  No lab results found.    Invalid input(s): \"VANCO\"    Toxoplasma Testing  No lab results found.    Invalid input(s): \"TOXPL\", \"TOXABM\", " "\"TOXPCR\"    Hepatitis B Testing   Recent Labs   Lab Test 06/04/23  1901 06/20/17  0929 12/19/16  0800   HBCAB  --  Nonreactive  --    HEPBANG Nonreactive  --  Nonreactive     Hepatitis C Testing     Hepatitis C Antibody   Date Value Ref Range Status   06/04/2023 Nonreactive Nonreactive Final   12/19/2016  NR Final    Nonreactive   Assay performance characteristics have not been established for newborns,   infants, and children     11/29/2016  NR Final    Nonreactive   Assay performance characteristics have not been established for newborns,   infants, and children       Respiratory Virus Testing    No results found for: \"RS\", \"FLUAG\"         Imaging:   CT abdomen pelvis W 11/8/2023: No abscess, colitis or free air  "

## 2023-11-10 ENCOUNTER — APPOINTMENT (OUTPATIENT)
Dept: CARDIOLOGY | Facility: CLINIC | Age: 58
End: 2023-11-10
Attending: INTERNAL MEDICINE
Payer: COMMERCIAL

## 2023-11-10 PROBLEM — R78.81 BACTEREMIA: Status: ACTIVE | Noted: 2023-11-10

## 2023-11-10 LAB
ANION GAP SERPL CALCULATED.3IONS-SCNC: 11 MMOL/L (ref 7–15)
BACTERIA UR CULT: NO GROWTH
BACTERIA UR CULT: NO GROWTH
BUN SERPL-MCNC: 11.2 MG/DL (ref 6–20)
CALCIUM SERPL-MCNC: 9.3 MG/DL (ref 8.6–10)
CHLORIDE SERPL-SCNC: 101 MMOL/L (ref 98–107)
CREAT SERPL-MCNC: 0.46 MG/DL (ref 0.51–0.95)
DEPRECATED HCO3 PLAS-SCNC: 27 MMOL/L (ref 22–29)
EGFRCR SERPLBLD CKD-EPI 2021: >90 ML/MIN/1.73M2
ERYTHROCYTE [DISTWIDTH] IN BLOOD BY AUTOMATED COUNT: 13.3 % (ref 10–15)
GLUCOSE BLDC GLUCOMTR-MCNC: 104 MG/DL (ref 70–99)
GLUCOSE BLDC GLUCOMTR-MCNC: 113 MG/DL (ref 70–99)
GLUCOSE BLDC GLUCOMTR-MCNC: 122 MG/DL (ref 70–99)
GLUCOSE BLDC GLUCOMTR-MCNC: 79 MG/DL (ref 70–99)
GLUCOSE BLDC GLUCOMTR-MCNC: 85 MG/DL (ref 70–99)
GLUCOSE BLDC GLUCOMTR-MCNC: 91 MG/DL (ref 70–99)
GLUCOSE SERPL-MCNC: 133 MG/DL (ref 70–99)
HCT VFR BLD AUTO: 37.8 % (ref 35–47)
HGB BLD-MCNC: 12.2 G/DL (ref 11.7–15.7)
LVEF ECHO: NORMAL
MCH RBC QN AUTO: 30.3 PG (ref 26.5–33)
MCHC RBC AUTO-ENTMCNC: 32.3 G/DL (ref 31.5–36.5)
MCV RBC AUTO: 94 FL (ref 78–100)
PLATELET # BLD AUTO: 328 10E3/UL (ref 150–450)
POTASSIUM SERPL-SCNC: 3.7 MMOL/L (ref 3.4–5.3)
RBC # BLD AUTO: 4.03 10E6/UL (ref 3.8–5.2)
SODIUM SERPL-SCNC: 139 MMOL/L (ref 135–145)
WBC # BLD AUTO: 5.2 10E3/UL (ref 4–11)

## 2023-11-10 PROCEDURE — G0378 HOSPITAL OBSERVATION PER HR: HCPCS

## 2023-11-10 PROCEDURE — 82962 GLUCOSE BLOOD TEST: CPT

## 2023-11-10 PROCEDURE — 36415 COLL VENOUS BLD VENIPUNCTURE: CPT

## 2023-11-10 PROCEDURE — 250N000011 HC RX IP 250 OP 636: Mod: JZ | Performed by: EMERGENCY MEDICINE

## 2023-11-10 PROCEDURE — 85027 COMPLETE CBC AUTOMATED: CPT

## 2023-11-10 PROCEDURE — 250N000009 HC RX 250: Performed by: HOSPITALIST

## 2023-11-10 PROCEDURE — 250N000013 HC RX MED GY IP 250 OP 250 PS 637

## 2023-11-10 PROCEDURE — 200N000002 HC R&B ICU UMMC

## 2023-11-10 PROCEDURE — 250N000011 HC RX IP 250 OP 636: Mod: JZ | Performed by: INTERNAL MEDICINE

## 2023-11-10 PROCEDURE — 258N000003 HC RX IP 258 OP 636: Performed by: INTERNAL MEDICINE

## 2023-11-10 PROCEDURE — 250N000011 HC RX IP 250 OP 636: Mod: JZ

## 2023-11-10 PROCEDURE — 99233 SBSQ HOSP IP/OBS HIGH 50: CPT | Mod: 24 | Performed by: STUDENT IN AN ORGANIZED HEALTH CARE EDUCATION/TRAINING PROGRAM

## 2023-11-10 PROCEDURE — 93306 TTE W/DOPPLER COMPLETE: CPT

## 2023-11-10 PROCEDURE — 96376 TX/PRO/DX INJ SAME DRUG ADON: CPT

## 2023-11-10 PROCEDURE — 258N000003 HC RX IP 258 OP 636: Performed by: EMERGENCY MEDICINE

## 2023-11-10 PROCEDURE — 99232 SBSQ HOSP IP/OBS MODERATE 35: CPT | Performed by: INTERNAL MEDICINE

## 2023-11-10 PROCEDURE — 93306 TTE W/DOPPLER COMPLETE: CPT | Mod: 26 | Performed by: INTERNAL MEDICINE

## 2023-11-10 PROCEDURE — 80048 BASIC METABOLIC PNL TOTAL CA: CPT

## 2023-11-10 PROCEDURE — 250N000011 HC RX IP 250 OP 636

## 2023-11-10 RX ORDER — GUAIFENESIN 600 MG/1
15 TABLET, EXTENDED RELEASE ORAL DAILY
Status: DISCONTINUED | OUTPATIENT
Start: 2023-11-10 | End: 2023-11-15

## 2023-11-10 RX ORDER — SODIUM CHLORIDE, SODIUM LACTATE, POTASSIUM CHLORIDE, CALCIUM CHLORIDE 600; 310; 30; 20 MG/100ML; MG/100ML; MG/100ML; MG/100ML
INJECTION, SOLUTION INTRAVENOUS CONTINUOUS
Status: DISCONTINUED | OUTPATIENT
Start: 2023-11-10 | End: 2023-11-11

## 2023-11-10 RX ORDER — DEXTROSE MONOHYDRATE 100 MG/ML
INJECTION, SOLUTION INTRAVENOUS CONTINUOUS PRN
Status: DISCONTINUED | OUTPATIENT
Start: 2023-11-10 | End: 2023-11-15 | Stop reason: HOSPADM

## 2023-11-10 RX ADMIN — METHYLNALTREXONE BROMIDE 12 MG: 12 INJECTION, SOLUTION SUBCUTANEOUS at 17:28

## 2023-11-10 RX ADMIN — SCOPALAMINE 1 PATCH: 1 PATCH, EXTENDED RELEASE TRANSDERMAL at 08:17

## 2023-11-10 RX ADMIN — PROMETHAZINE HYDROCHLORIDE 25 MG: 25 INJECTION INTRAMUSCULAR; INTRAVENOUS at 10:57

## 2023-11-10 RX ADMIN — PROMETHAZINE HYDROCHLORIDE 25 MG: 25 INJECTION INTRAMUSCULAR; INTRAVENOUS at 21:14

## 2023-11-10 RX ADMIN — ACETAMINOPHEN 650 MG: 325 SOLUTION ORAL at 02:58

## 2023-11-10 RX ADMIN — AMITRIPTYLINE HYDROCHLORIDE 40 MG: 10 TABLET, FILM COATED ORAL at 20:55

## 2023-11-10 RX ADMIN — HYDROMORPHONE HYDROCHLORIDE 0.3 MG: 1 INJECTION, SOLUTION INTRAMUSCULAR; INTRAVENOUS; SUBCUTANEOUS at 09:29

## 2023-11-10 RX ADMIN — CEFTRIAXONE SODIUM 2 G: 2 INJECTION, POWDER, FOR SOLUTION INTRAMUSCULAR; INTRAVENOUS at 06:40

## 2023-11-10 RX ADMIN — METHOCARBAMOL TABLETS 750 MG: 750 TABLET, COATED ORAL at 11:30

## 2023-11-10 RX ADMIN — LORAZEPAM 0.5 MG: 2 INJECTION INTRAMUSCULAR; INTRAVENOUS at 06:21

## 2023-11-10 RX ADMIN — LORAZEPAM 0.5 MG: 2 INJECTION INTRAMUSCULAR; INTRAVENOUS at 14:49

## 2023-11-10 RX ADMIN — HYDROMORPHONE HYDROCHLORIDE 0.3 MG: 1 INJECTION, SOLUTION INTRAMUSCULAR; INTRAVENOUS; SUBCUTANEOUS at 13:36

## 2023-11-10 RX ADMIN — LEVOTHYROXINE SODIUM 137 MCG: 0.11 TABLET ORAL at 08:27

## 2023-11-10 RX ADMIN — HYDROMORPHONE HYDROCHLORIDE 0.3 MG: 1 INJECTION, SOLUTION INTRAMUSCULAR; INTRAVENOUS; SUBCUTANEOUS at 21:08

## 2023-11-10 RX ADMIN — ACETAMINOPHEN 650 MG: 325 SOLUTION ORAL at 06:40

## 2023-11-10 RX ADMIN — SENNOSIDES 5 ML: 8.8 LIQUID ORAL at 08:26

## 2023-11-10 RX ADMIN — SODIUM CHLORIDE, POTASSIUM CHLORIDE, SODIUM LACTATE AND CALCIUM CHLORIDE: 600; 310; 30; 20 INJECTION, SOLUTION INTRAVENOUS at 12:30

## 2023-11-10 RX ADMIN — HYDROMORPHONE HYDROCHLORIDE 0.3 MG: 1 INJECTION, SOLUTION INTRAMUSCULAR; INTRAVENOUS; SUBCUTANEOUS at 03:11

## 2023-11-10 RX ADMIN — LIDOCAINE 1 PATCH: 4 PATCH TOPICAL at 08:19

## 2023-11-10 RX ADMIN — FAMOTIDINE 20 MG: 40 POWDER, FOR SUSPENSION ORAL at 08:26

## 2023-11-10 RX ADMIN — ACETAMINOPHEN 650 MG: 325 SOLUTION ORAL at 13:27

## 2023-11-10 RX ADMIN — METHOCARBAMOL TABLETS 750 MG: 750 TABLET, COATED ORAL at 20:55

## 2023-11-10 ASSESSMENT — ACTIVITIES OF DAILY LIVING (ADL)
ADLS_ACUITY_SCORE: 37

## 2023-11-10 NOTE — PROGRESS NOTES
[facility] ID Service: Follow Up Note      Patient:  Dinora Mcghee, Date of birth 1965, Medical record number [id number]  Date of Visit:  November 10, 2023         Assessment and Recommendations:     Problem List:  Klebsiella pneumonia bacteremia  Blood cultures in 4/4 bottles on 11/8/2023 Pan-S Klebsiella pneumonia   Blood culture from right Port-A-Cath on 11/9/2023 pending  Right upper chest Port-A-Cath removed 11/9/2023  History of prior Pan-Sensitive Klebsiella pneumonia bacteremias  Admission @ [facility] 6/4/23-6/10/2023: Treated with ertapenem 1 g daily IV x14 days (6/7/2023 - 6/21/2023)  Admission @ [facility] 10/9/2023 - 10/13/2023: Patient did not want Port-A-Cath removed so salvage therapy with ceftriaxone 2 g daily IV with line lock x14 days (10/10/2023 - 10/24/2023)  Hx of choledochoduodenostomy, duodenojejunostomy, Vera-en-Y bypass reconstruction s/p J-tube for TF and G-tube for venting     Recommendations:  Continue ceftriaxone 2 g daily IV. Tentative duration 2 weeks from first set of negative BCx  Follow repeat blood cultures drawn on 11/9/23  Please obtain TTE to ensure no endocarditis     Discussion:  Ms Mcghee is a 58-year-old female with an extensive abdominal surgery history including chronic pancreatitis due to gallstones s/p TPIAT 12/2016, gastroparesis, choledochoduodenostomy, duodenojejunostomy, splenectomy, several bowel obstructions, s/p right upper chest Port-A-Cath for IVF/IV meds, J-tube for tube feeds nightly and G-tube for venting and 2 prior episodes of Klebsiella pneumonia bacteremia (6/7/23 treated with ertapenem 1 g daily IV x14 days and 10/9/2023 treated with ceftriaxone 2 g daily IV + line lock x14 days). She was initially admitted to [facility] on 11/8/2023 for abdominal discomfort, fevers and rigors where she was found to have recurrent Klebsiella pneumonia bacteremia.  She is s/p right upper chest Port-A-Cath removal on 11/9/23.  Currently on ceftriaxone 2 g  daily IV with repeat blood cultures pending. We will recommend to obtain a TTE given that she has failed salvage therapy with abx line lock prior to this current admission.     ID will continue to follow     Discussed with ID Attending, Dr Blevins, and primary team    Arelis Shoemaker DO, PGY 4  Infectious Disease Fellow  [facility]  Pager: [phone]          Interval History:     No acute events overnight. On evaluation this am, she is resting on the bed accompanied by her friend. Denies any fevers, chills, rigors, dysuria.  Still with ongoing gastroparesis after eating some Jell-O this a.m.  No significant pain to right upper chest after port removal.         Review of Systems:   Full 9 pt ROS obtained, pertinent positives and negatives as above.          Current Antimicrobials   Current:   Ceftriaxone 2g daily (11/9/23 -          Physical Exam:   Ranges for vital signs:  Temp:  [97.6  F (36.4  C)-98.2  F (36.8  C)] 97.6  F (36.4  C)  Pulse:  [70-87] 70  Resp:  [11-18] 16  BP: (114-140)/(73-87) 118/79  SpO2:  [96 %-100 %] 98 %    Intake/Output Summary (Last 24 hours) at 11/10/2023 0936  Last data filed at 11/10/2023 0750  Gross per 24 hour   Intake 180 ml   Output 140 ml   Net 40 ml     Exam:  GENERAL: Chronically ill-appearing, thin   ENT:  Head is normocephalic, atraumatic. Oropharynx is moist without exudates or ulcers.  EYES:  Eyes have anicteric sclerae.    NECK:  Supple.  LUNGS: Slightly diminished breath sounds but clear to auscultation bilaterally  CARDIOVASCULAR:  Regular rate and rhythm with no murmurs, gallops or rubs.  ABDOMEN: Soft.  Tenderness to palpation around G-tube and J-tube.  No guarding, rigidity or rebound tenderness concerning for acute abdomen  EXT: Extremities warm and without edema.  SKIN:  No acute rashes.  Right upper chest Port-A-Cath removed with dressing in place that is clean, dry, intact without any active drainage.  No erythema or warmth noted around G-tube  sites  NEUROLOGIC:  Grossly nonfocal.         Laboratory Data:   Reviewed.  Pertinent for: Cr 0.5, WBC 5.2    Culture data:  Bcx (11/8/23) Klebsiella PNA (Pan-S)  Bcx (11/9/23) pending         Imaging:   CT abdomen pelvis W 11/8/2023: No abscess, colitis or free air

## 2023-11-10 NOTE — PROVIDER NOTIFICATION
Provider paged re:    Pt has her own supply of creon; unable to be verified by pharmacy. She cannot get a creon bottle from home until tomorrow, can she use what she has? thanks

## 2023-11-10 NOTE — UTILIZATION REVIEW
Admission Status; Secondary Review Determination     Admission Date: 11/8/2023  7:32 PM       Under the authority of the Utilization Management Committee, the utilization review process indicated a secondary review on the above patient.  The review outcome is based on review of the medical records, discussions with staff, and applying clinical experience noted on the date of the review.        (x)      Inpatient Status Appropriate - This patient's medical care is consistent with medical management for inpatient care and reasonable inpatient medical practice.       RATIONALE FOR DETERMINATION      Brief clinical presentation, information copied from the chart, abbreviated and edited for relevant content:       Paged team to change to IP. Meeting IP guidelines.     Ms Mcghee is a 58-year-old female with an extensive abdominal surgery history including chronic pancreatitis due to gallstones s/p TPIAT 12/2016, gastroparesis, choledochoduodenostomy, duodenojejunostomy, splenectomy, several bowel obstructions, s/p right upper chest Port-A-Cath for IVF/IV meds, J-tube for tube feeds nightly and G-tube for venting and 2 prior episodes of Klebsiella pneumonia bacteremia (6/7/23 treated with ertapenem 1 g daily IV x14 days and 10/9/2023 treated with ceftriaxone 2 g daily IV + line lock x14 days). She was initially admitted to [facility] on 11/8/2023 for abdominal discomfort, fevers and rigors where she was found to have recurrent Klebsiella pneumonia bacteremia. - two positive blood cultures.  She is s/p right upper chest Port-A-Cath removal.  Currently on ceftriaxone 2 g daily IV with repeat blood cultures pending from 11/9. Not stable for discharge.        At the time of admission with the information available to the attending physician, more than 2 nights hospital complex care was anticipated. Also, there was a risk of adverse outcome if patient was treated outpatient or observation. High intensity of services anticipated.  Inpatient admission appropriate based on Medicare guidelines.       The information on this document is developed by the utilization review team in order for the business office to ensure compliance.  This only denotes the appropriateness of proper admission status and does not reflect the quality of care rendered.         The definitions of Inpatient Status and Observation Status used in making the determination above are those provided in the CMS Coverage Manual, Chapter 1 and Chapter 6, section 70.4.      Sincerely,      Mi Clinton MD   Utilization Review/ Case Management  [facility].

## 2023-11-10 NOTE — PROGRESS NOTES
"-tolerating oral intake to maintain hydration: not met, only tolerating ice chips  -adequate pain control on oral analgesics: met    Cares continue  ./83 (BP Location: Left arm)   Pulse 73   Temp 98  F (36.7  C) (Oral)   Resp 16   Ht 1.727 m (5' 8\")   Wt 56.7 kg (125 lb)   SpO2 96%   BMI 19.01 kg/m     "

## 2023-11-10 NOTE — PHARMACY-ADMISSION MEDICATION HISTORY
Pharmacy Intern Admission Medication History    Admission medication history is complete. The information provided in this note is only as accurate as the sources available at the time of the update.    Information Source(s): Patient and CareEverywhere/SureScripts via phone    Pertinent Information:   Spoke to patient over the phone to conduct med hx  Per patient:  Patient's home scopolamine patch has been on since Tuesday and is due for a new patch tomorrow  She hasn't taken CREON for about 2 weeks since she has not been taking anything by mouth    Changes made to PTA medication list:  Added:   Baclofen 10 mg tablet  Medical cannabis  Promethazine 25 mg/ml injection  Deleted:   Baclofen 5 mg tablet  Flonase  Montelukast 10 mg tablet  Promethazine 25 mg  UNABLE TO FIND (medical cannabis)  Changed: None    Allergies reviewed with patient and updates made in EHR: no    Medication History Completed By: Catarina Suero 11/9/2023 8:08 PM    Current Facility-Administered Medications for the 11/8/23 encounter (Hospital Encounter)   Medication    Botulinum Toxin Type A (BOTOX) 200 units injection 155 Units     PTA Med List   Medication Sig Last Dose    acetaminophen (TYLENOL) 325 MG/10.15ML solution 20.3 mLs (650 mg) by Per J Tube route every 6 hours as needed for mild pain or fever 11/8/2023 at pm    amitriptyline (ELAVIL) 10 MG tablet Take 4 tablets (40 mg) by mouth At Bedtime 11/7/2023 at pm    amylase-lipase-protease (CREON) 22202-55029 units CPEP per EC capsule Take 3-4 capsules by mouth 3 times daily (with meals) With oral meals Past Month at unknown    baclofen (LIORESAL) 10 MG tablet Take 10 mg by mouth 3 times daily 11/8/2023 at 1700    Buprenorphine HCl (BELBUCA) 150 MCG FILM buccal film Place 150 mcg inside cheek every 12 hours 11/8/2023 at am    cetirizine (ZYRTEC) 10 MG tablet 1 tablet (10 mg) by Per G Tube route daily More than a month at unknown    Digestive Enzyme Cartridge (RELIZORB) MARCO 2 Cartridges daily  11/8/2023 at 2000    diphenhydrAMINE (BENADRYL) 12.5 MG/5ML solution Take 25 mg by mouth 4 times daily as needed for other (nausea) 11/8/2023 at am    estradiol (VAGIFEM) 10 MCG TABS vaginal tablet INSERT 1 TABLET(10 MCG) VAGINALLY 2 TIMES A WEEK (Patient taking differently: INSERT 1 TABLET(10 MCG) VAGINALLY every third day) 11/7/2023 at unknown    famotidine (PEPCID) 40 MG/5ML suspension 2.5 mLs (20 mg) by Per J Tube route daily 11/8/2023 at am    glucagon 1 MG kit Give 0.1 to 0.15mg( 10-15 units on Insulin sryinge) subcutaneous  every 15 minutes PRN for hypoglycemia. Remix new kit q24hr. Needs up to 3 kit/week. 11/6/2023 at unknown    glucose 40 % GEL gel Take 15-30 g by mouth every 15 minutes as needed for low blood sugar Unknown at unknown    Insulin Glargine-yfgn (SEMGLEE, YFGN,) 100 UNIT/ML SOPN Inject 3 Units Subcutaneous every 24 hours 11/7/2023 at unknown    levothyroxine (SYNTHROID/LEVOTHROID) 137 MCG tablet 137 mcg by Per G Tube route daily 11/8/2023 at am    lidocaine (LIDODERM) 5 % patch Place onto the skin every 24 hours To prevent lidocaine toxicity, patient should be patch free for 12 hrs daily. Past Week at unknown    medical cannabis (Patient's own supply) Take 1 lozenge 4 MCT to 1 THC by mouth daily for pain and nausea 11/7/2023 at unknown    methocarbamol (ROBAXIN) 750 MG tablet Take 1 tablet (750 mg) by mouth 4 times daily as needed for muscle spasms 11/8/2023 at 1600    pantoprazole (PROTONIX) 40 mg IV push injection Inject 40 mg into the vein daily (with breakfast) 11/7/2023 at am    prochlorperazine (COMPAZINE) 10 MG tablet Take 10 mg by mouth every 6 hours as needed for nausea or vomiting 11/7/2023 at am    promethazine (PHENERGAN) 25 MG/ML IV injection Inject 25 mg into the muscle daily as needed for nausea or vomiting 11/8/2023 at 1400    prucalopride succinate (MOTEGRITY) 2 MG tablet Take 1 tablet (2 mg) by mouth daily Per G-tube 11/8/2023 at am    scopolamine (TRANSDERM) 1 MG/3DAYS 72 hr  "patch Place 1 patch onto the skin every 72 hours 11/7/2023 at unknown    sennosides (SENOKOT) 8.8 MG/5ML syrup 5 mLs by Per J Tube route 2 times daily 11/8/2023 at am    silver nitrate (ARZOL SILVER NIT APPLICATORS) 75-25 % miscellaneous Apply topically as needed for wound care Apply Silver Nitrate Sticks every 2-3 days until granulation tissue resolved.  \"Roll\" tip of Silver Nitrate Q tip directly onto the granulation tissue-bulging tissue area.  Have Agency or Home Care Nurse administer this, if you prefer.  Take care not to touch any healthy tissue or skin.  The tissue will turn white and eventually black & scab off.  May repeat if needed in the future for recurrence. Past Month at unknown    thiamine (B-1) 100 MG tablet 1 tablet (100 mg) by Per J Tube route daily 11/7/2023 at pm    zinc oxide - white petrolatum (CRITIC-AID THICK MOIST BARRIER) 20-51% PSTE topical paste Apply 71 g topically every hour as needed for rash (Under J tube bumper when needed for skin protection) 11/8/2023 at unknown    ZOLMitriptan (ZOMIG-ZMT) 5 MG ODT Take 1 tablet (5 mg) by mouth at onset of headache for migraine Dissolve tablet in the mouth. May repeat in 2 hours. Max 2 tablets/24 hours. 11/8/2023 at unknown        "

## 2023-11-10 NOTE — PHARMACY-CONSULT NOTE
Pharmacy Tube Feeding Consult    Medication reviewed for administration by feeding tube and for potential food/drug interactions.    Recommendation: No changes are needed at this time.     Pharmacy will continue to follow as new medications are ordered.    Sweta Lynn RPH

## 2023-11-10 NOTE — PROGRESS NOTES
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      Intractable nausea/vomiting   Abdominal Pain   Gastroparesis    Follows with Dr. Mandeep Park with GI, and [facility]. 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 Currently on tube feeds for 12hrs/day and IV fluids for which she has a port. CT A/P without acute process. Received 1L LR bolus in ED.   -Fleet enemas prn   -Continue scopolamine patch   -Dilaudid 0.3mg q3prn   -Continue PTA IV phenergan   -Continue Creon with meals   -Hold tube feeds   -Advance diet as tolerated   -G-tube to gravity   -transition to continuous fluid will not taking much by mouth or through j tube.   - Discussing placement of J tube with GI to see if appropriate to feed through. If ok with consult nutrition for feeds.   -Compazine for nausea  -Can consider GI consult if no symptomatic relief    - Will start on methyl natrexone. Appear to have need taken partial opiate agonist previous to admission. Monitor for opiate induced constipation with cyclical diarrhea secondary to bowel meds.      Bacteremia   H/o CLABSI Klebsiella pneumoniae bacteremia x2   Blood  cultures growing gram negative bacilli. Was previously admitted twice for Klebsiella bacteremia, first 6/23/23 requiring 14-day course of ertapenem. Was then admitted to [place] for TPN-associated CLABSI Klebsiella pneumoniae infection leading to septic shock. Blood cultures positive on 10/9, first negative 10/11. Received ceftriaxone w/ antibiotic lock therapy to save port. Patient followed with infectious disease who stated that port site may be source of re-seeding bloodstream. Also has history of direct jejunostomy site infection. Patient afebrile and without leukocytosis on admission. Source of infection most likely open port site.   -Start ceftriaxone- appears to have clinical response   -Pending urine cultures  -Pending procal   -Can order blood cultures from port site pending assessment of wound   -port removal yesterday      Insulin-dependent diabetes mellitus   Patient reports using 7u Lantus daily while on tube feeds.   -Start 3u lantus/day   -LSSI     Hypothyroidism  -Continue PTA levothyroxine           Diet: Advance Diet as Tolerated: Clear Liquid Diet    DVT Prophylaxis: Pneumatic Compression Devices  Piedra Catheter: Not present  Lines: None     Cardiac Monitoring: None  Code Status: No CPR- Pre-arrest intubation OK      Clinically Significant Risk Factors Present on Admission                                  Disposition Plan      Expected Discharge Date: 11/12/2023                    Peewee Dixon MD  Medicine Service, [facility] TEAM [address] [place] [address]  Securely message with [employer] (more info)  Text page via [employer] Paging/Directory   See signed in provider for up to date coverage information  ______________________________________________________________________    Interval History   No vomiting but dry heaves  Phenegren and ativan are effective  Laible bowel movements   No fever or chills  Abdominal discomfort.     Physical Exam   Vital Signs: Temp: 97.8  F (36.6   C) Temp src: Oral BP: 134/82 Pulse: 93   Resp: 18 SpO2: 97 % O2 Device: None (Room air)    Weight: 125 lbs 0 oz  Physical Exam  Constitutional:       General: She is not in acute distress.     Appearance: Normal appearance. She is not ill-appearing or toxic-appearing.   HENT:      Head: Normocephalic.      Mouth/Throat:      Mouth: Mucous membranes are moist.   Cardiovascular:      Rate and Rhythm: Normal rate.      Heart sounds: No murmur heard.     No friction rub. No gallop.   Pulmonary:      Effort: Pulmonary effort is normal.   Abdominal:      General: There is no distension.      Palpations: There is no mass.      Tenderness: There is abdominal tenderness. There is no rebound.      Comments: Decrease bowel sounds   Musculoskeletal:      Right lower leg: No edema.      Left lower leg: No edema.   Neurological:      General: No focal deficit present.      Mental Status: She is alert and oriented to person, place, and time.      Cranial Nerves: No cranial nerve deficit.      Motor: No weakness.   Psychiatric:         Mood and Affect: Mood normal.           Medical Decision Making       **CLEAR ALL SELECTIONS**      Data     I have personally reviewed the following data over the past 24 hrs:    5.2  \   12.2   / 328     139 101 11.2 /  104 (H)   3.7 27 0.46 (L) \

## 2023-11-10 NOTE — PROGRESS NOTES
"Gastroenterology Brief Note    Contacted by medicine team 11/10 to inquire if PEJ in appropriate position to resume TF.  This note is a product of chart review and medicine team reports (did not examine patient).    Dinora Mcghee is a 58 year old female well known to both the luminal and panc/bili GI services due to PMH including chronic pancreatitis (ERCP associated), s/p cholecystectomy, s/p TPIAT (2016) c/b DM, gastroparesis with chronic N/V and malnutrition with hx of TPN (now off) and now TF dependence (initially had PEG-J but due to frequent retraction of Jtube, converted initial site to PEG and placed separate PEJ on 11/28/2021 on 12 hr nocturnal cycle with IVFs PRN) and hx of klebsiella bacteremia (6/23/23 and hospitalized again at [place] for recurrence 10/9/23).  Admitted 11/8 due to LLQ, abdominal pain, N/V with concern for possible SBO but no acute pathology found on CT AP but found to have recurrent klebsiella bacteremia.  S/p removal of port 11/9/2023.      Per medicine team, now with improving stool outputs, no further N/V, benign abd exam and desire to restart PEJ TF today but per CT AP report noted \"A percutaneous jejunostomy tube is present with distal tip in the left hemiabdomen\" and wondering if appropriate for use.    Recommendations/Intervention:  -Reviewed CT AP imaging with Radiology who confirmed that both PEG and PEJ balloons appear in appropriate position with PEJ tip in the lumen of jejunum.  -Appropriate to resume TF via PEJ per RD recs.    Discussed above with primary medicine team, Dr. Dixon.    Antoinette Araujo PA-C, RD  GI Service  Pager *[phone]     "

## 2023-11-10 NOTE — PROGRESS NOTES
"-tolerating oral intake to maintain hydration: not met, only tolerating ice chips  -adequate pain control on oral analgesics: met   /79 (BP Location: Left arm)   Pulse 70   Temp 97.6  F (36.4  C) (Oral)   Resp 16   Ht 1.727 m (5' 8\")   Wt 56.7 kg (125 lb)   SpO2 98%   BMI 19.01 kg/m     "

## 2023-11-10 NOTE — PROGRESS NOTES
CLINICAL NUTRITION SERVICES - ASSESSMENT NOTE     Nutrition Prescription    RECOMMENDATIONS FOR MDs/PROVIDERS TO ORDER:   Note: Liquid Tylenol currently ordered Q6hrs which contains a high amount of sorbitol. This may contribute to osmotic diarrhea due to hyperosmolarity. Recommend switch liquid Tylenol solution to tablet (crush and flush) if able.     Malnutrition Status:    Unable to assess, pt busy with testing, unable to obtain updated hx and NFPE today.    Recommendations already ordered by Registered Dietitian (RD):  Initiate TFs via J-port as below (reviewed recent RD note for home regimen), will need to substitute formula in hospital, as home formula not available.   NOTE: Home TF regimen is cyclic; however, MD instructs to start continuous TFs at trickle rate and begin rate advancement tomorrow 11/11.     Use dosing weight 60 kg  EN access: J-port   - Initiate Vital 1.5 abdulkadir (or equivalent) at rate of 10 mL/hr. Do not begin advancing rate until 11/11, once MD approves.   - Once able to advance TF rate per MD, increase rate by 10 mL Q8hrs as tolerated, to goal rate of 45 mL/hr, pending pt's tolerance.  - Do not advance TF rate unless Mg++ >1.5, K+ is >3, and phos >1.9  - Recommend 60 ml q4hr fluid flushes for tube patency. Additional fluids and/or adjustments per MD.       Goal Regimen:   Vital 1.5 Abdulkadir at goal of  45ml/hr  (1080ml/day)  will provide: 1620 kcals, 72 g PRO, 825 ml free H20, 201 g CHO, and 6 g fiber daily.     RELIZORB CARTRIDGES with TF  *Change 1 cartridge every 24 hours with TF rate at 10-20 ml/hr  *Change 1 cartridge every 12 hours with TF rate >20 ml/hr  *Supplies: Obtain cartridges by calling [phone] and provide peoplesoft #[id number]    Future/Additional Recommendations:  Monitor nutrition-related findings and follow pt per protocol     REASON FOR ASSESSMENT  Dinora Mcghee is a/an 58 year old female assessed by the dietitian for Provider Order - Registered Dietitian to Assess and Order TF  "per Medical Nutrition Therapy Protocol \"Please start tube feed at trickle of 10 ml/hr to start advancing to goal on 11/11\"    CLINICAL HISTORY  Pt with a hx of RNY GB with noted reconstruction c/b gastroparesis requiring TFs, chronic pancreatitis s/p TPAIT (2016), T1DM, splenectomy, hepatoduodenostomy, hypothyroidism, pituitary adenoma s/p resection, and uterine tumor s/p hysterectomy, admitted to [facility] with increased lower abdominal pain and increased N/V.    NUTRITION HISTORY  - RD unable to meet with patient today; assessment per chart review only (pt busy with ECHO and later busy with other providers). Recent RD notes available for review.    - Per H&P current admit:   \"Patient reports a 2-day history of increased nausea/vomiting and lower abdominal pain preventing her from eating anything. She has had multiple episodes of similar symptoms in the past and patient reports that IV phenergan, LR, fentanyl, and dilaudid do the most to control her pain. She denies cough, chest pain, hematemesis, hematochezia, melena, hematuria, vaginal discharge, vaginal bleeding, but does endorse dysuria. She describes her pain as intense cramping and has tried venting her G tube, but it has not been helping. Fleet enemas provide temporary relief.\"     Food allergies: strawberries, apples, celeries, alice, watermelon, apple juice,     - Per RD notes from recent admission 10/10/23:   \"Pt eats orally for comfort however very little as this causes nausea and rapid rise in BG. Pt today states she feels extremely nauseated. Pt states at home gives herself nocturnal TF of Peptamen 1.5 of 4 cans (250 mL each) via tube feeding pump and via J tube around 6 or 7 pm, runs for 12 hours overnight. Fluid flushes 3X/day - 30 mL before meds and 30 mL after meds. At times will give self 1 L of LR for hydration. Pt takes Relizorb with feedings. For BG control is taking basal insulin 7U/day at 10 am (did not take this date d/t hospitalization). UBW has " "been around 60 kg however pt would like to gain weight. Admit wt of 65.9 kg is likely incorrect (bed weight with pillows and backpack on bed per pt report). Will request a re-weight this date.      Unfortunately this facility does not carry Peptamen 1.5 at this time and pt is sensitive to tube feedings. Pt in agreement to have  bring in tube feeding, tube feeding supplies, Relizorb and cord for TF pump (pt has pump in her room). Plan is to continue TF as pt does at home.\"    Home Regimen Tube Feeding via J tube:  Formula: Peptamen 1.5  Total Daily Volume: 1008 mL (4 cans at 250 mL each over 12 hours)  Tube Feeding Calories: 1512 kcals  Tube Feeding Protein: 69 gms  Free Water Flush Amount: 778 mL  Number of Water Flushes per Day: 3  Free Water from Flushes: 60 mL  Total Free Water Including Flushes and Supplements: 958 mL   Nutrition Additives/Supplements: None  Relizorb cartridges with TFs      CURRENT NUTRITION ORDERS  Diet: Clear Liquid  Supplements: None    Intake/Tolerance: No intake documented yet since admit     GI  Hx gastroparesis   Gastrojejunostomy tube   GI note today:   \"Desire to restart PEJ TF today but per CT AP report noted \"A percutaneous jejunostomy tube is present with distal tip in the left hemiabdomen\" and wondering if appropriate for use. Reviewed CT AP imaging with Radiology who confirmed that both PEG and PEJ balloons appear in appropriate position with PEJ tip in the lumen of jejunum. Appropriate to resume TF via PEJ per RD recs.\"  No BM documented to I/O yet since admit     LABS:  Reviewed    Note, high Vit D levels 113 on 5/9/23, consider rechecking levels to assess current status      MEDICATIONS  Tylenol solution (Q6hrs)   Low sliding scale insulin Q4hrs; Lantus once daily   Synthroid  Creon 24, 3-4 capsules with meals TID   Senokot BID  IVF - LR at 75 mL/hr     SKIN  No concerns noted      ANTHROPOMETRICS  Height: 172.7 cm (5' 8\")  Most Recent Weight: 56.7 kg (125 lb)  IBW: 63.6 " kg  89%IBW   BMI: Normal BMI    Weight History:   - UBW per recent RD note ~60 kg (currently below this)  -~4.7% loss over past 1 month; does not quite meet significant weight loss criteria but trending towards.   Wt Readings from Last 15 Encounters:   11/08/23 56.7 kg (125 lb)   11/08/23 56.7 kg (125 lb)   10/26/23 59.9 kg (132 lb)   10/17/23 59.4 kg (131 lb)   10/04/23 59.5 kg (131 lb 1.6 oz)   09/07/23 60.6 kg (133 lb 8 oz)   08/01/23 59 kg (130 lb)   07/24/23 59 kg (130 lb)   07/20/23 60.6 kg (133 lb 11.2 oz)   06/23/23 60.4 kg (133 lb 4 oz)   06/09/23 59.5 kg (131 lb 1.6 oz)   05/11/23 57.6 kg (127 lb)   05/04/23 58.6 kg (129 lb 3 oz)   03/30/23 60.8 kg (134 lb)   03/06/23 60.9 kg (134 lb 3.2 oz)       Dosing Weight: 60 kg (reported UBW per recent RD note)    ASSESSED NUTRITION NEEDS  Estimated Energy Needs: 9150-3351 kcals/day (25 - 30 kcals/kg)  Justification: Maintenance  Estimated Protein Needs: 72-90 grams protein/day (1.2 - 1.5 grams of pro/kg)  Justification: Increased needs, acute illness   Estimated Fluid Needs:  (1 mL/kcal)   Justification: Maintenance    PHYSICAL FINDINGS  See malnutrition section below.    MALNUTRITION  % Intake: Unable to assess  % Weight Loss: Weight loss does not meet criteria  Subcutaneous Fat Loss: Unable to assess  Muscle Loss: Unable to assess  Fluid Accumulation/Edema: None noted  Malnutrition Diagnosis: Unable to determine due to incomplete assessment (need interview/NFPE)    NUTRITION DIAGNOSIS  Inadequate oral intake related to alteration in GI fxn (N/V/abd pain) as evidenced by TFs held by MD; now plan to restart trickle TFs and progress towards home regimen as tolerated.      INTERVENTIONS  Implementation  Nutrition Education: Unable to complete due to pt unavailable    Collaboration with other providers - Bedside RN   Enteral Nutrition - Initiate trickle TFs + Relizorb    Goals  Total avg nutritional intake to meet a minimum of 25 kcal/kg and 1.2 g PRO/kg daily (per  dosing wt 60 kg).     Monitoring/Evaluation  Progress toward goals will be monitored and evaluated per protocol.  Robel Marshall RDN, LD, CNSC  Available via phone or [employer] chat, and pager  6B work-room RD phone: *[phone]   6B/Obs RD pager: [phone]         Weekend/Holiday RD pager [phone]

## 2023-11-11 ENCOUNTER — APPOINTMENT (OUTPATIENT)
Dept: GENERAL RADIOLOGY | Facility: CLINIC | Age: 58
End: 2023-11-11
Attending: INTERNAL MEDICINE
Payer: COMMERCIAL

## 2023-11-11 LAB
ANION GAP SERPL CALCULATED.3IONS-SCNC: 12 MMOL/L (ref 7–15)
BACTERIA BLD CULT: ABNORMAL
BUN SERPL-MCNC: 9.9 MG/DL (ref 6–20)
CALCIUM SERPL-MCNC: 9.1 MG/DL (ref 8.6–10)
CHLORIDE SERPL-SCNC: 103 MMOL/L (ref 98–107)
CREAT SERPL-MCNC: 0.5 MG/DL (ref 0.51–0.95)
DEPRECATED HCO3 PLAS-SCNC: 26 MMOL/L (ref 22–29)
EGFRCR SERPLBLD CKD-EPI 2021: >90 ML/MIN/1.73M2
GLUCOSE BLDC GLUCOMTR-MCNC: 121 MG/DL (ref 70–99)
GLUCOSE BLDC GLUCOMTR-MCNC: 157 MG/DL (ref 70–99)
GLUCOSE BLDC GLUCOMTR-MCNC: 72 MG/DL (ref 70–99)
GLUCOSE BLDC GLUCOMTR-MCNC: 73 MG/DL (ref 70–99)
GLUCOSE BLDC GLUCOMTR-MCNC: 78 MG/DL (ref 70–99)
GLUCOSE BLDC GLUCOMTR-MCNC: 86 MG/DL (ref 70–99)
GLUCOSE BLDC GLUCOMTR-MCNC: 95 MG/DL (ref 70–99)
GLUCOSE SERPL-MCNC: 85 MG/DL (ref 70–99)
HOLD SPECIMEN: NORMAL
MAGNESIUM SERPL-MCNC: 1.8 MG/DL (ref 1.7–2.3)
PHOSPHATE SERPL-MCNC: 4.4 MG/DL (ref 2.5–4.5)
POTASSIUM SERPL-SCNC: 3.8 MMOL/L (ref 3.4–5.3)
PROVATION GI EXAM: NORMAL
SODIUM SERPL-SCNC: 141 MMOL/L (ref 135–145)

## 2023-11-11 PROCEDURE — 36415 COLL VENOUS BLD VENIPUNCTURE: CPT | Performed by: INTERNAL MEDICINE

## 2023-11-11 PROCEDURE — 999N000179 XR SURGERY CARM FLUORO LESS THAN 5 MIN W STILLS: Mod: TC

## 2023-11-11 PROCEDURE — 99207 PR NO CHARGE LOS: CPT | Performed by: STUDENT IN AN ORGANIZED HEALTH CARE EDUCATION/TRAINING PROGRAM

## 2023-11-11 PROCEDURE — 250N000011 HC RX IP 250 OP 636

## 2023-11-11 PROCEDURE — 250N000013 HC RX MED GY IP 250 OP 250 PS 637: Performed by: HOSPITALIST

## 2023-11-11 PROCEDURE — 87040 BLOOD CULTURE FOR BACTERIA: CPT

## 2023-11-11 PROCEDURE — 99232 SBSQ HOSP IP/OBS MODERATE 35: CPT | Mod: GC | Performed by: INTERNAL MEDICINE

## 2023-11-11 PROCEDURE — 200N000002 HC R&B ICU UMMC

## 2023-11-11 PROCEDURE — 250N000013 HC RX MED GY IP 250 OP 250 PS 637

## 2023-11-11 PROCEDURE — 250N000013 HC RX MED GY IP 250 OP 250 PS 637: Performed by: INTERNAL MEDICINE

## 2023-11-11 PROCEDURE — 258N000003 HC RX IP 258 OP 636

## 2023-11-11 PROCEDURE — 84100 ASSAY OF PHOSPHORUS: CPT | Performed by: INTERNAL MEDICINE

## 2023-11-11 PROCEDURE — 0D20XUZ CHANGE FEEDING DEVICE IN UPPER INTESTINAL TRACT, EXTERNAL APPROACH: ICD-10-PCS | Performed by: INTERNAL MEDICINE

## 2023-11-11 PROCEDURE — 80048 BASIC METABOLIC PNL TOTAL CA: CPT | Performed by: INTERNAL MEDICINE

## 2023-11-11 PROCEDURE — 49451 REPLACE DUOD/JEJ TUBE PERC: CPT | Performed by: INTERNAL MEDICINE

## 2023-11-11 PROCEDURE — C9113 INJ PANTOPRAZOLE SODIUM, VIA: HCPCS | Mod: JZ

## 2023-11-11 PROCEDURE — 258N000003 HC RX IP 258 OP 636: Performed by: INTERNAL MEDICINE

## 2023-11-11 PROCEDURE — G0500 MOD SEDAT ENDO SERVICE >5YRS: HCPCS | Performed by: INTERNAL MEDICINE

## 2023-11-11 PROCEDURE — 36415 COLL VENOUS BLD VENIPUNCTURE: CPT

## 2023-11-11 PROCEDURE — 250N000011 HC RX IP 250 OP 636: Mod: JZ | Performed by: INTERNAL MEDICINE

## 2023-11-11 PROCEDURE — 83735 ASSAY OF MAGNESIUM: CPT | Performed by: INTERNAL MEDICINE

## 2023-11-11 RX ORDER — DIPHENHYDRAMINE HYDROCHLORIDE 50 MG/ML
INJECTION INTRAMUSCULAR; INTRAVENOUS PRN
Status: DISCONTINUED | OUTPATIENT
Start: 2023-11-11 | End: 2023-11-13

## 2023-11-11 RX ORDER — DEXTROSE, SODIUM CHLORIDE, SODIUM LACTATE, POTASSIUM CHLORIDE, AND CALCIUM CHLORIDE 5; .6; .31; .03; .02 G/100ML; G/100ML; G/100ML; G/100ML; G/100ML
INJECTION, SOLUTION INTRAVENOUS CONTINUOUS
Status: DISCONTINUED | OUTPATIENT
Start: 2023-11-11 | End: 2023-11-11

## 2023-11-11 RX ORDER — BISACODYL 10 MG
10 SUPPOSITORY, RECTAL RECTAL DAILY PRN
Status: DISCONTINUED | OUTPATIENT
Start: 2023-11-11 | End: 2023-11-15 | Stop reason: HOSPADM

## 2023-11-11 RX ORDER — FAMOTIDINE 40 MG/5ML
20 POWDER, FOR SUSPENSION ORAL DAILY
Status: DISCONTINUED | OUTPATIENT
Start: 2023-11-12 | End: 2023-11-15 | Stop reason: HOSPADM

## 2023-11-11 RX ORDER — DRONABINOL 2.5 MG/1
2.5 CAPSULE ORAL 2 TIMES DAILY
Status: DISCONTINUED | OUTPATIENT
Start: 2023-11-11 | End: 2023-11-11

## 2023-11-11 RX ORDER — FENTANYL CITRATE 50 UG/ML
INJECTION, SOLUTION INTRAMUSCULAR; INTRAVENOUS PRN
Status: DISCONTINUED | OUTPATIENT
Start: 2023-11-11 | End: 2023-11-13

## 2023-11-11 RX ORDER — DRONABINOL 2.5 MG/1
CAPSULE ORAL
Status: DISCONTINUED
Start: 2023-11-11 | End: 2023-11-11 | Stop reason: WASHOUT

## 2023-11-11 RX ORDER — ACETAMINOPHEN 325 MG/10.15ML
650 LIQUID ORAL EVERY 6 HOURS PRN
Status: DISCONTINUED | OUTPATIENT
Start: 2023-11-11 | End: 2023-11-15 | Stop reason: HOSPADM

## 2023-11-11 RX ORDER — BACLOFEN 10 MG/1
10-20 TABLET ORAL 3 TIMES DAILY
Status: DISCONTINUED | OUTPATIENT
Start: 2023-11-11 | End: 2023-11-15 | Stop reason: HOSPADM

## 2023-11-11 RX ORDER — SODIUM CHLORIDE, SODIUM LACTATE, POTASSIUM CHLORIDE, CALCIUM CHLORIDE 600; 310; 30; 20 MG/100ML; MG/100ML; MG/100ML; MG/100ML
INJECTION, SOLUTION INTRAVENOUS CONTINUOUS
Status: ACTIVE | OUTPATIENT
Start: 2023-11-11 | End: 2023-11-12

## 2023-11-11 RX ADMIN — BACLOFEN 20 MG: 10 TABLET ORAL at 19:26

## 2023-11-11 RX ADMIN — SENNOSIDES 5 ML: 8.8 LIQUID ORAL at 19:35

## 2023-11-11 RX ADMIN — PANTOPRAZOLE SODIUM 40 MG: 40 INJECTION, POWDER, FOR SOLUTION INTRAVENOUS at 11:34

## 2023-11-11 RX ADMIN — LORAZEPAM 0.5 MG: 2 INJECTION INTRAMUSCULAR; INTRAVENOUS at 20:10

## 2023-11-11 RX ADMIN — LIDOCAINE 1 PATCH: 4 PATCH TOPICAL at 10:30

## 2023-11-11 RX ADMIN — ACETAMINOPHEN 650 MG: 325 SOLUTION ORAL at 13:59

## 2023-11-11 RX ADMIN — ACETAMINOPHEN 650 MG: 325 SOLUTION ORAL at 19:24

## 2023-11-11 RX ADMIN — LORAZEPAM 0.5 MG: 2 INJECTION INTRAMUSCULAR; INTRAVENOUS at 01:15

## 2023-11-11 RX ADMIN — SODIUM CHLORIDE, SODIUM LACTATE, POTASSIUM CHLORIDE, CALCIUM CHLORIDE AND DEXTROSE MONOHYDRATE: 5; 600; 310; 30; 20 INJECTION, SOLUTION INTRAVENOUS at 14:27

## 2023-11-11 RX ADMIN — INSULIN ASPART 1 UNITS: 100 INJECTION, SOLUTION INTRAVENOUS; SUBCUTANEOUS at 17:54

## 2023-11-11 RX ADMIN — LORAZEPAM 0.5 MG: 2 INJECTION INTRAMUSCULAR; INTRAVENOUS at 09:03

## 2023-11-11 RX ADMIN — BUPRENORPHINE HYDROCHLORIDE 150 MCG: 150 FILM, SOLUBLE BUCCAL at 21:48

## 2023-11-11 RX ADMIN — SODIUM CHLORIDE, POTASSIUM CHLORIDE, SODIUM LACTATE AND CALCIUM CHLORIDE: 600; 310; 30; 20 INJECTION, SOLUTION INTRAVENOUS at 00:57

## 2023-11-11 RX ADMIN — METHOCARBAMOL TABLETS 750 MG: 750 TABLET, COATED ORAL at 13:51

## 2023-11-11 RX ADMIN — HYDROMORPHONE HYDROCHLORIDE 0.3 MG: 1 INJECTION, SOLUTION INTRAMUSCULAR; INTRAVENOUS; SUBCUTANEOUS at 05:07

## 2023-11-11 RX ADMIN — FAMOTIDINE 20 MG: 10 INJECTION, SOLUTION INTRAVENOUS at 12:00

## 2023-11-11 RX ADMIN — SODIUM CHLORIDE, POTASSIUM CHLORIDE, SODIUM LACTATE AND CALCIUM CHLORIDE: 600; 310; 30; 20 INJECTION, SOLUTION INTRAVENOUS at 16:52

## 2023-11-11 RX ADMIN — BUPRENORPHINE HYDROCHLORIDE 150 MCG: 150 FILM, SOLUBLE BUCCAL at 11:58

## 2023-11-11 RX ADMIN — DRONABINOL 2.5 MG: 5 SOLUTION ORAL at 21:45

## 2023-11-11 RX ADMIN — CEFTRIAXONE SODIUM 2 G: 2 INJECTION, POWDER, FOR SOLUTION INTRAMUSCULAR; INTRAVENOUS at 06:28

## 2023-11-11 RX ADMIN — AMITRIPTYLINE HYDROCHLORIDE 40 MG: 10 TABLET, FILM COATED ORAL at 21:47

## 2023-11-11 RX ADMIN — SODIUM PHOSPHATE 1 ENEMA: 7; 19 ENEMA RECTAL at 11:34

## 2023-11-11 RX ADMIN — BACLOFEN 20 MG: 10 TABLET ORAL at 14:22

## 2023-11-11 ASSESSMENT — ACTIVITIES OF DAILY LIVING (ADL)
ADLS_ACUITY_SCORE: 37

## 2023-11-11 NOTE — PLAN OF CARE
"Goal Outcome Evaluation:      Plan of Care Reviewed With: patient, spouse    Overall Patient Progress: improvingOverall Patient Progress: improving     Shift: 9969-9223  VS: /80 (BP Location: Left arm)   Pulse 86   Temp 97.8  F (36.6  C)   Resp 16   Ht 1.727 m (5' 8\")   Wt 56.7 kg (125 lb)   SpO2 98%   BMI 19.01 kg/m    Pain: Pain managed with oral pain med   Neuro: remains alert and oriented x 4.  Cardiac: wnl,denies chest pain  Respiratory: wnl, denies SOB, stable on room air  Diet/Appetite:  Remains on Clear liquid diet and TF resumed  GI/: spont. voiding  LDA's: PIV infusing 75ml hr  Skin:  no new skin issue, incision on R chest from port removal  Activity: Independent with activity  Tests/Procedures:   Pertinent Labs/Lab Collection:    Family at bedside. TF resumed at 1420 infusing at 10 ml/hr after new PEJ  placed. Last blood sugar 112  Plan: Monitor labs in TF  "

## 2023-11-11 NOTE — OR NURSING
Replacement of PEJ performed under moderate sedation, patient tolerated well. Transported back to OBS and report called to RN.

## 2023-11-11 NOTE — PROGRESS NOTES
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 Now being treated for CLABSI Klebsiella pneumoniae bacteremia.     Bacteremia   H/o CLABSI Klebsiella pneumoniae bacteremia x2   Blood cultures growing gram negative bacilli. Was previously admitted twice for Klebsiella bacteremia, first 6/23/23 requiring 14-day course of ertapenem. Was then admitted to [place] for TPN-associated CLABSI Klebsiella pneumoniae infection leading to septic shock. Blood cultures positive on 10/9, first negative 10/11. Received ceftriaxone w/ antibiotic lock therapy to save port. Patient followed with infectious disease who stated that port site may be source of re-seeding bloodstream. Also has history of direct jejunostomy site infection. Patient afebrile and without leukocytosis on admission. Source of infection most likely open port site. Port removed 11/9. Echo on 11/8 with echodensity on the cusp of the aortic valve that could be consistent with vegetation, so SHELLIE is indicated.   - Infectious disease consult, appreciate recs:    - Repeat blood cultures today, 11/11/23    - Obtain SHELLIE to r/o endocarditis    - Continue ceftriaxone- appears to have clinical response. Tentative duration 2 weeks from first set of blood cultures   - Can start PPN 48-72 hours after port removal.      Intractable nausea/vomiting   Abdominal Pain   Gastroparesis     Follows with Dr. Mandeep Park with GI, and [facility]. 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 CT A/P without acute process. J tube malfunction on night of 11/10. Getting replaced today, 11/11. Becoming hypoglycemic on 11/11 so started   - Diet:    - Replacing PEJ today with panc-hollie team, 11/11    - May restart J tube feeds 10 mL/hr  - Continuous fluid: LR 50 ml/hr  - Advance diet as tolerated: Clear liquid diet    - G-tube to gravity  - Nutrition consulted  - Can start PPN 48-72 hours after port removal  - PTA thiamine   - Bowel reg:    - Fleet enemas PRN (patient prefers over suppositories)    - Sennosides BID scheduled- on hold until J tube replaced   - Will start on methyl natrexone, first dose 11/10 and next dose 11/12. Appear to have need taken partial opiate agonist previous to admission. Monitor for opiate induced constipation with cyclical diarrhea secondary to bowel meds.   - For nausea:    - Continue PTA IV phenergan   - Continue scopolamine patch    - Continue compazine    - Creon with meals   - Continue Lorazepam q6h injection PRN    - Start PTA PPI: Protonix AM and Famotidine per J tube   - Pain:    - Dilaudid 0.3mg q3 PRN    - Acetaminophen q6h scheduled   - Start dronabinol due to PTA medical marijuana   - Start PTA buprenorphine 150 mcg q12h   - Start PTA baclofen  - Health psychology consult for distress related to medical condition      Insulin-dependent diabetes mellitus   Patient reports using 7u Lantus daily while on tube feeds.   -Start 3u lantus/day   -LSSI     Hypothyroidism  -Continue PTA levothyroxine         Diet: Advance Diet as Tolerated: Clear Liquid Diet  Adult Formula Drip Feeding: Continuous Vital 1.5; Jejunostomy; Goal Rate: 10 mL/hr  "(TRICKLE ONLY) 11/10 per MD. On 11/11, begin rate advancement by 10 mL Q8hrs as tolerated, to goal rate of 45 mL/hr. Attach Relizorb with TFs, per pt care order (se...    DVT Prophylaxis: Pneumatic Compression Devices  Piedra Catheter: Not present  Lines: PIV  Cardiac Monitoring: None  Code Status: No CPR- Pre-arrest intubation OK      Clinically Significant Risk Factors                                    Disposition Plan     Expected Discharge Date: 11/12/2023                    [facility]  Medicine Service, [facility] TEAM 5  [facility]  Securely message with [employer] (more info)  Text page via [facility] Paging/Directory   See signed in provider for up to date coverage information    Resident/Fellow Attestation   I, Wale Ventura MD, was present with the medical/[employer] student who participated in the service and in the documentation of the note.  I have verified the history and personally performed the physical exam and medical decision making.  I agree with the assessment and plan of care as documented in the note.      J-tube replaced. Patient distressed by current illness. Continue multimodal support/encouragement. Discuss SHELLIE with patient in the AM. Can consider TPN vs PPN depending on J-tube feeds.     Wale Ventura MD  PGY2  Date of Service (when I saw the patient): 11/11/23  ______________________________________________________________________    Interval History   - Last night, patient had tube feeds through J tube for only about \"20 min\" before she stood up to walk around and heard a pop. She noticed the dressing get displaced and moist. It was painful at the area, dilaudid helped.   - Patient is very distressed and anxious about being in the hospital and her constant pain. She states that she feels like a \"burden\" and that she feels like she is in a black hole.   - Her abdominal pain is \"collicky\".   - She feels very tired, and \"needs food\".   - No fever or " chills  - No vomiting since hospitalization, just dry heaving.   - Feels acid in her throat.   - Last BM on Tuesday 11/7. Agreeable to enema.     Physical Exam   Vital Signs: Temp: 97.8  F (36.6  C) Temp src: Oral BP: 129/80 Pulse: 86   Resp: 16 SpO2: 98 % O2 Device: None (Room air) Oxygen Delivery: 2 LPM  Weight: 125 lbs 0 oz    Physical Exam  Constitutional:       General: She is not in acute distress.     Appearance: Normal appearance. She is not ill-appearing or toxic-appearing.   HENT:      Head: Normocephalic.      Mouth/Throat:      Mouth: Mucous membranes are moist.   Cardiovascular:      Rate and Rhythm: Normal rate.      Heart sounds: No murmur heard.     No friction rub. No gallop.   Pulmonary:      Effort: Pulmonary effort is normal.   Abdominal:      General: There is no distension.      Palpations: There is no mass.      Tenderness: There is abdominal tenderness in the suprapubic area and left lower quadrant. There is no rebound.   Musculoskeletal:      Right lower leg: No edema.      Left lower leg: No edema.   Neurological:      General: No focal deficit present.      Mental Status: She is alert and oriented to person, place, and time.      Cranial Nerves: No cranial nerve deficit.      Motor: No weakness.   Psychiatric:      Comments: Tearful, distressed          Medical Decision Making       **CLEAR ALL SELECTIONS**      Data     I have personally reviewed the following data over the past 24 hrs:    N/A  \   N/A   / N/A     141 103 9.9 /  73   3.8 26 0.50 (L) \       BMP WNL   Glucose 73

## 2023-11-11 NOTE — PROVIDER NOTIFICATION
Provider paged re:  FYI patient's J tube balloon has popped and it is becoming dislodged. Please Advise.

## 2023-11-11 NOTE — PLAN OF CARE
"Goal Outcome Evaluation:      Plan of Care Reviewed With: patient    Overall Patient Progress: no change    /82 (BP Location: Left arm)   Pulse 76   Temp 97.5  F (36.4  C) (Oral)   Resp 16   Ht 1.727 m (5' 8\")   Wt 56.7 kg (125 lb)   SpO2 97%   BMI 19.01 kg/m        Shift 6405-8264      Neuro: A/Ox4  Cardiac: denies chest pain  Respiratory: denies SOB, stable on room air  GI/: complains of continuous nausea, voiding spontaneously, last BM 11/08, J tube dislodged, provider aware, J tube site reinforced with primapore  Diet/appetite: clear liquid diet, not tolerating much oral intake  Activity: up independently in room and hallway  Pain: complains of pain in abdomen, increased pain at J tube site, states pain is 3/10, being managed with PRN IV dilaudid  Skin: incision on R chest from port removal  LDA's: PIV in R arm, infusing LR @ 75    "

## 2023-11-11 NOTE — PROGRESS NOTES
"Neuro: A&Ox4.   Cardiac: SR. VSS.   Respiratory: Sating 99%on RA.  GI/: Adequate urine output. Last BM 11/8  Diet/appetite:  CLD, pt does not tolerate much by mouth  Activity:  Independent, up to chair and in halls.  Pain: managed with PRN dilaudid and robaxin, endorses pain at port removal site and lower abdominal pain   Skin: Port removal site R chest, covered with tegaderm, redness,edges well- approximated  LDA's: R PIV infusing LR @ 75 ml/hr  G tube: gravity drainage and all crushed meds  J tube: TF @ 10ml/hr ( began at 6:30 pm), 60 ml flush q 4, all liquid meds    BP (!) 132/90 (BP Location: Left arm)   Pulse 84   Temp 98.4  F (36.9  C) (Oral)   Resp 18   Ht 1.727 m (5' 8\")   Wt 56.7 kg (125 lb)   SpO2 99%   BMI 19.01 kg/m       Plan: Continue with POC. Notify primary team with changes.  "

## 2023-11-11 NOTE — PROGRESS NOTES
Infectious disease brief note:    Could not evaluate the patient this afternoon secondary to her getting her J-tube replaced.  Was able to speak with her  and son at bedside.  Updated them of the plans to obtain a transesophageal echocardiogram to rule out endocarditis given TTE findings of echodense mass to her left coronary cusp aortic valve.    Assessment:  1.  Klebsiella pneumonia bacteremia  2.  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  History of prior pansensitive Klebsiella pneumonia bacteremia as  4.  Hx of choledochoduodenostomy, duodenojejunostomy, Vera-en-Y bypass reconstruction s/p J-tube for TF and G-tube for venting     Plan:  - Please obtain SHELLIE  - Please obtain 2 sets of new blood cultures today  - Continue ceftriaxone 2 g daily IV    Discussed with ID attending, Dr. Bartlett, and primary team    Arelis Shoemaker DO, PGY 4  Infectious Disease Fellow  [facility]  Pager: [phone]

## 2023-11-12 LAB
ANION GAP SERPL CALCULATED.3IONS-SCNC: 10 MMOL/L (ref 7–15)
BUN SERPL-MCNC: 7.3 MG/DL (ref 6–20)
CALCIUM SERPL-MCNC: 8.7 MG/DL (ref 8.6–10)
CHLORIDE SERPL-SCNC: 102 MMOL/L (ref 98–107)
CREAT SERPL-MCNC: 0.53 MG/DL (ref 0.51–0.95)
DEPRECATED HCO3 PLAS-SCNC: 28 MMOL/L (ref 22–29)
EGFRCR SERPLBLD CKD-EPI 2021: >90 ML/MIN/1.73M2
GLUCOSE BLDC GLUCOMTR-MCNC: 111 MG/DL (ref 70–99)
GLUCOSE BLDC GLUCOMTR-MCNC: 116 MG/DL (ref 70–99)
GLUCOSE BLDC GLUCOMTR-MCNC: 126 MG/DL (ref 70–99)
GLUCOSE BLDC GLUCOMTR-MCNC: 135 MG/DL (ref 70–99)
GLUCOSE BLDC GLUCOMTR-MCNC: 153 MG/DL (ref 70–99)
GLUCOSE BLDC GLUCOMTR-MCNC: 154 MG/DL (ref 70–99)
GLUCOSE SERPL-MCNC: 110 MG/DL (ref 70–99)
HOLD SPECIMEN: NORMAL
MAGNESIUM SERPL-MCNC: 2 MG/DL (ref 1.7–2.3)
PHOSPHATE SERPL-MCNC: 4.3 MG/DL (ref 2.5–4.5)
POTASSIUM SERPL-SCNC: 3.6 MMOL/L (ref 3.4–5.3)
SODIUM SERPL-SCNC: 140 MMOL/L (ref 135–145)

## 2023-11-12 PROCEDURE — 250N000013 HC RX MED GY IP 250 OP 250 PS 637

## 2023-11-12 PROCEDURE — 200N000002 HC R&B ICU UMMC

## 2023-11-12 PROCEDURE — 83735 ASSAY OF MAGNESIUM: CPT | Performed by: INTERNAL MEDICINE

## 2023-11-12 PROCEDURE — 99233 SBSQ HOSP IP/OBS HIGH 50: CPT | Performed by: STUDENT IN AN ORGANIZED HEALTH CARE EDUCATION/TRAINING PROGRAM

## 2023-11-12 PROCEDURE — 84100 ASSAY OF PHOSPHORUS: CPT | Performed by: INTERNAL MEDICINE

## 2023-11-12 PROCEDURE — 80048 BASIC METABOLIC PNL TOTAL CA: CPT | Performed by: INTERNAL MEDICINE

## 2023-11-12 PROCEDURE — C9113 INJ PANTOPRAZOLE SODIUM, VIA: HCPCS | Mod: JZ

## 2023-11-12 PROCEDURE — 999N000128 HC STATISTIC PERIPHERAL IV START W/O US GUIDANCE

## 2023-11-12 PROCEDURE — 250N000013 HC RX MED GY IP 250 OP 250 PS 637: Performed by: INTERNAL MEDICINE

## 2023-11-12 PROCEDURE — 99253 IP/OBS CNSLTJ NEW/EST LOW 45: CPT | Mod: GC | Performed by: INTERNAL MEDICINE

## 2023-11-12 PROCEDURE — 99232 SBSQ HOSP IP/OBS MODERATE 35: CPT | Performed by: INTERNAL MEDICINE

## 2023-11-12 PROCEDURE — 84443 ASSAY THYROID STIM HORMONE: CPT | Performed by: INTERNAL MEDICINE

## 2023-11-12 PROCEDURE — 250N000011 HC RX IP 250 OP 636

## 2023-11-12 PROCEDURE — 250N000011 HC RX IP 250 OP 636: Mod: JZ

## 2023-11-12 PROCEDURE — 250N000013 HC RX MED GY IP 250 OP 250 PS 637: Performed by: HOSPITALIST

## 2023-11-12 PROCEDURE — 36415 COLL VENOUS BLD VENIPUNCTURE: CPT | Performed by: INTERNAL MEDICINE

## 2023-11-12 PROCEDURE — 250N000011 HC RX IP 250 OP 636: Mod: JZ | Performed by: INTERNAL MEDICINE

## 2023-11-12 RX ORDER — BISACODYL 5 MG
10 TABLET, DELAYED RELEASE (ENTERIC COATED) ORAL DAILY PRN
Status: DISCONTINUED | OUTPATIENT
Start: 2023-11-12 | End: 2023-11-15 | Stop reason: HOSPADM

## 2023-11-12 RX ORDER — SODIUM CHLORIDE, SODIUM LACTATE, POTASSIUM CHLORIDE, CALCIUM CHLORIDE 600; 310; 30; 20 MG/100ML; MG/100ML; MG/100ML; MG/100ML
INJECTION, SOLUTION INTRAVENOUS
Status: COMPLETED
Start: 2023-11-12 | End: 2023-11-13

## 2023-11-12 RX ADMIN — DRONABINOL 2.5 MG: 5 SOLUTION ORAL at 19:52

## 2023-11-12 RX ADMIN — DIPHENHYDRAMINE HYDROCHLORIDE 25 MG: 25 SOLUTION ORAL at 19:52

## 2023-11-12 RX ADMIN — ACETAMINOPHEN 650 MG: 325 SOLUTION ORAL at 14:27

## 2023-11-12 RX ADMIN — CEFTRIAXONE SODIUM 2 G: 2 INJECTION, POWDER, FOR SOLUTION INTRAMUSCULAR; INTRAVENOUS at 06:16

## 2023-11-12 RX ADMIN — SENNOSIDES 5 ML: 8.8 LIQUID ORAL at 19:52

## 2023-11-12 RX ADMIN — AMITRIPTYLINE HYDROCHLORIDE 40 MG: 10 TABLET, FILM COATED ORAL at 22:30

## 2023-11-12 RX ADMIN — BACLOFEN 10 MG: 10 TABLET ORAL at 08:58

## 2023-11-12 RX ADMIN — LORAZEPAM 0.5 MG: 2 INJECTION INTRAMUSCULAR; INTRAVENOUS at 03:24

## 2023-11-12 RX ADMIN — ACETAMINOPHEN 650 MG: 325 SOLUTION ORAL at 08:59

## 2023-11-12 RX ADMIN — METHYLNALTREXONE BROMIDE 12 MG: 12 INJECTION, SOLUTION SUBCUTANEOUS at 09:05

## 2023-11-12 RX ADMIN — Medication 15 ML: at 08:58

## 2023-11-12 RX ADMIN — BACLOFEN 10 MG: 10 TABLET ORAL at 14:27

## 2023-11-12 RX ADMIN — SENNOSIDES 5 ML: 8.8 LIQUID ORAL at 09:00

## 2023-11-12 RX ADMIN — BUPRENORPHINE HYDROCHLORIDE 150 MCG: 150 FILM, SOLUBLE BUCCAL at 22:30

## 2023-11-12 RX ADMIN — PROCHLORPERAZINE EDISYLATE 10 MG: 5 INJECTION INTRAMUSCULAR; INTRAVENOUS at 20:08

## 2023-11-12 RX ADMIN — FAMOTIDINE 20 MG: 40 POWDER, FOR SUSPENSION ORAL at 09:01

## 2023-11-12 RX ADMIN — LORAZEPAM 0.5 MG: 2 INJECTION INTRAMUSCULAR; INTRAVENOUS at 10:12

## 2023-11-12 RX ADMIN — DRONABINOL 2.5 MG: 5 SOLUTION ORAL at 09:06

## 2023-11-12 RX ADMIN — LEVOTHYROXINE SODIUM 137 MCG: 0.11 TABLET ORAL at 08:59

## 2023-11-12 RX ADMIN — BUPRENORPHINE HYDROCHLORIDE 150 MCG: 150 FILM, SOLUBLE BUCCAL at 09:06

## 2023-11-12 RX ADMIN — PANTOPRAZOLE SODIUM 40 MG: 40 INJECTION, POWDER, FOR SOLUTION INTRAVENOUS at 08:58

## 2023-11-12 RX ADMIN — LIDOCAINE 1 PATCH: 4 PATCH TOPICAL at 08:59

## 2023-11-12 RX ADMIN — HYDROMORPHONE HYDROCHLORIDE 0.3 MG: 1 INJECTION, SOLUTION INTRAMUSCULAR; INTRAVENOUS; SUBCUTANEOUS at 17:31

## 2023-11-12 RX ADMIN — ACETAMINOPHEN 650 MG: 325 SOLUTION ORAL at 01:58

## 2023-11-12 RX ADMIN — BACLOFEN 10 MG: 10 TABLET ORAL at 19:52

## 2023-11-12 RX ADMIN — ACETAMINOPHEN 650 MG: 325 SOLUTION ORAL at 19:52

## 2023-11-12 ASSESSMENT — ACTIVITIES OF DAILY LIVING (ADL)
ADLS_ACUITY_SCORE: 37

## 2023-11-12 NOTE — PROGRESS NOTES
Attestation     Constipation- Thin strip of stool with enema yesterday. Will add motility agent for synergistic effect such as dulcolax       Nutrition- Restarted on trickle tube feeds. Discussed timing of TPN and PICC line with ID. At 5:00PM pateint will have been 72 hours with a line holiday. Will order picc line and start TPN for severe protein calorie malnutrition despite J tube feeds. While restarting feeds via J tube will add relisorb cartridge back to feeds.    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 Now being treated for CLABSI Klebsiella pneumoniae bacteremia.     Changes today:   - Discontinued methylnatrexone (two doses, 11/10 and 11/12 for possible opioid induced constipation), as constipation likely represents patient's baseline low gastric motility  - Start bisacodyl tabs in G-tube (per patient's PTA regimen)   - Consult Cardiology for SHELLIE d/t concern for endocarditis     To dos on Monday 11/13:   - PICC in AM   - Discuss with Nutrition initiation of TPN    Bacteremia   H/o CLABSI Klebsiella pneumoniae bacteremia x2   Blood cultures growing gram negative bacilli. Was previously admitted twice for Klebsiella bacteremia, first 6/23/23 requiring 14-day course of ertapenem. Was then admitted to [facility] for TPN-associated CLABSI Klebsiella pneumoniae infection leading to septic  "shock. Blood cultures positive on 10/9, first negative 10/11. Received ceftriaxone w/ antibiotic lock therapy to save port. Patient followed with infectious disease who stated that port site may be source of re-seeding bloodstream. Also has history of direct jejunostomy site infection. Patient afebrile and without leukocytosis on admission. Source of infection most likely open port site. Port line removed 11/9. Echo on 11/8 with echodensity on the cusp of the aortic valve that could be consistent with vegetation, so SHELLIE is indicated.   - Infectious disease consult, appreciate recs:    - Repeat blood cultures 11/11/23: NGTD   - Obtain SHELLIE to r/o endocarditis, consulted cardiology   - Continue ceftriaxone- appears to have clinical response. Tentative duration 2 weeks from first set of blood cultures (end date: 11/22/23)    - Okay to start TPN after line holiday        Intractable nausea/vomiting   Abdominal Pain   Gastroparesis    Follows with Dr. Mandeep Park with GI, and [facility]. 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 CT A/P without acute process. J tube malfunction on night of 11/10. Getting replaced today, 11/11.   Patient would like to start PPN or TPN due to feeling \"much better\" on it in the past in terms of energy.   - Diet:    - Replaced PEJ with panc-hollie team on 11/11    - May restart J tube feeds 10 mL/hr  - Advance diet as tolerated: Clear liquid diet    - G-tube to gravity  - Nutrition consulted:   - After line holiday, could restart TPN.   - PTA thiamine   - Bowel reg:    - Fleet enemas PRN (patient prefers over suppositories)    - Start bisacodyl tabs in G-tube (per patient's PTA regimen)    - Sennosides BID scheduled  - Discontinued methyl natrexone (two " "doses, 11/10 and 11/12 for possible opioid induced constipation), as constipation likely represents patient's baseline low gastric motility  - For nausea:    - Continue PTA IV phenergan   - Continue scopolamine patch    - Continue compazine    - Creon with meals   - Continue Lorazepam q6h injection PRN    - Start PTA PPI: Protonix AM and Famotidine per J tube   - Pain:    - Dilaudid 0.3mg q3 PRN    - Acetaminophen q6h scheduled   - Start dronabinol due to PTA medical marijuana   - Start PTA buprenorphine 150 mcg q12h   - Start PTA baclofen  - Health psychology consult for distress related to medical condition      Insulin-dependent diabetes mellitus   Patient reports using 7u Lantus daily while on tube feeds.   -Start 3u lantus/day   -LSSI     Hypothyroidism  -Continue PTA levothyroxine         Diet: Advance Diet as Tolerated: Clear Liquid Diet  Adult Formula Drip Feeding: Continuous Vital 1.5; Jejunostomy; Goal Rate: 10 mL/hr (TRICKLE ONLY) 11/10 per MD. On 11/11, begin rate advancement by 10 mL Q8hrs as tolerated, to goal rate of 45 mL/hr. Attach Relizorb with TFs, per pt care order (se...    DVT Prophylaxis: Pneumatic Compression Devices  Piedra Catheter: Not present  Lines: PIV  Cardiac Monitoring: None  Code Status: No CPR- Pre-arrest intubation OK      Clinically Significant Risk Factors                                    Disposition Plan     Expected Discharge Date: 11/12/2023              Disposition currently contingent on restarting TPN and SHELLIE.     Usha Day  Medicine Service, [facility] TEAM 5  [facility]  Securely message with [employer] (more info)  Text page via [employer] Paging/Directory   See signed in provider for up to date coverage information    ______________________________________________________________________    Interval History   - No acute events overnight.   - Got enema at 11:34 yesterday, had a \"small ribbon stool\". Patient still feels backed up. " She states that dulcolax works well for her crushed up in her G tube at home, and she typically takes three tabs.   - Patient states that her abdominal pain is much improved today. She currently rates it at a 5/10.   - She states that she continues to be nauseous, but at her baseline nausea. She dry-heaved twice overnight but did not vomit.     Physical Exam   Vital Signs: Temp: 97.8  F (36.6  C) Temp src: Oral BP: 123/82 Pulse: 82   Resp: 15 SpO2: 99 % O2 Device: None (Room air) Oxygen Delivery: 2 LPM  Weight: 125 lbs 0 oz    Vitals WNL     Physical Exam  Constitutional:       General: She is not in acute distress.     Appearance: Normal appearance. She is not ill-appearing or toxic-appearing.   HENT:      Head: Normocephalic.      Mouth/Throat:      Mouth: Mucous membranes are moist.   Cardiovascular:      Rate and Rhythm: Normal rate.      Heart sounds: No murmur heard.     No friction rub. No gallop.   Pulmonary:      Effort: Pulmonary effort is normal.      Breath sounds: Normal breath sounds. No wheezing or rales.   Abdominal:      General: There is no distension.      Palpations: There is no mass.      Tenderness: There is abdominal tenderness in the suprapubic area. There is no rebound.   Musculoskeletal:      Right lower leg: No edema.      Left lower leg: No edema.   Neurological:      General: No focal deficit present.      Mental Status: She is alert and oriented to person, place, and time.   Psychiatric:      Comments: Much improved, bright and talkative       Medical Decision Making       **CLEAR ALL SELECTIONS**      Data     I have personally reviewed the following data over the past 24 hrs:    N/A  \   N/A   / N/A     140 102 7.3 /  154 (H)   3.6 28 0.53 \

## 2023-11-12 NOTE — CONSULTS
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 Ludivina Nuñez MD.     Watson Ellington MD, PhD  Cardiology Fellow  Click to text page [phone]      -----------------------------------------------------------------------------------------------------------------------------------------  REASON FOR CONSULT:  endocarditis    History of Present Illness   Dinora Mcghee is a 58 year old female with a Vera-en-Y gastric bypass c/b gastroparesis requiring TPN that has now been c/b Klebsiella bacteremia. She was originally admitted 4 days ago due to increased abd pain and n/v. After ID consultation and additional microbiology workup, the source of the pt's bacteremia was determined to be from her TPN port site. As part of the bacteremia workup, a TTE was performed. This revealed a small mobile echodensity on the left coronary cusp of the aortic side of the aortic valve. Due to this new discovery, cardiology was consulted to assist with further management.     Past Medical History    I have reviewed this patient's medical history and updated it with pertinent information if needed.   Past Medical History:   Diagnosis Date    Allergic rhinitis, cause  unspecified     Allergy to other foods     strawberries, apples, celeries, alice, watermelon    Arthritis     left knee    Choledocholithiasis     long after cholecystectomy    Chronic abdominal pain     Chronic constipation     Chronic nausea     Chronic pancreatitis (H)     Degeneration of lumbar or lumbosacral intervertebral disc     Esophageal reflux     w/ hiatal hernia    Gastroparesis     Hiatal hernia     History of pituitary adenoma     s/p resection    Hypothyroidism     Migraines     Mild hyperlipidemia     On tube feeding diet     presence of GJ tube    Pancreatic disease     PD stricture, 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  Past Surgical History:   Procedure Laterality Date    ABDOMEN SURGERY      c sections: 93, 96, 98. endometriosis growth    APPENDECTOMY       SECTION      COLONOSCOPY      COLONOSCOPY N/A 2023    Procedure: COLONOSCOPY, WITH POLYPECTOMY AND BIOPSY;  Surgeon: Percy Chamorro MD;  [facility]    ENDOSCOPIC INSERTION TUBE GASTROSTOMY N/A 2021    Procedure: Gastrojejonostomy placement with jejunopexy, PEG tube exchange;  Surgeon: Zackery Montoya MD;  [facility]    ENDOSCOPIC RETROGRADE CHOLANGIOPANCREATOGRAM N/A 2015    Procedure: ENDOSCOPIC RETROGRADE CHOLANGIOPANCREATOGRAM;  Surgeon: Mandeep Park MD;  [facility]    ENDOSCOPIC RETROGRADE CHOLANGIOPANCREATOGRAM N/A 2016    Procedure: COMBINED ENDOSCOPIC RETROGRADE CHOLANGIOPANCREATOGRAPHY, PLACE TUBE/STENT;  Surgeon: Mandeep Park MD;  [facility]    ENDOSCOPIC RETROGRADE CHOLANGIOPANCREATOGRAM N/A 3/17/2016    Procedure:  COMBINED ENDOSCOPIC RETROGRADE CHOLANGIOPANCREATOGRAPHY, REMOVE FOREIGN BODY OR STENT/TUBE;  Surgeon: Mandeep Park MD;  [facility]    ENDOSCOPIC RETROGRADE CHOLANGIOPANCREATOGRAM N/A 8/2/2016    Procedure: COMBINED ENDOSCOPIC RETROGRADE CHOLANGIOPANCREATOGRAPHY, PLACE TUBE/STENT;  Surgeon: Mandeep Park MD;  [facility]    ENDOSCOPIC RETROGRADE CHOLANGIOPANCREATOGRAM N/A 8/26/2016    Procedure: COMBINED ENDOSCOPIC RETROGRADE CHOLANGIOPANCREATOGRAPHY, REMOVE FOREIGN BODY OR STENT/TUBE;  Surgeon: Mandeep Park MD;  [facility]    ENDOSCOPIC ULTRASOUND UPPER GASTROINTESTINAL TRACT (GI) N/A 10/3/2016    Procedure: ENDOSCOPIC ULTRASOUND, ESOPHAGOSCOPY / UPPER GASTROINTESTINAL TRACT (GI);  Surgeon: Guru Jose Rodas MD;  [facility]    ENTEROSCOPY SMALL BOWEL N/A 2/3/2022    Procedure: ENTEROSCOPY with possible fistula closure;  Surgeon: Francisco Dodson MD;  Location:  GI    ESOPHAGOSCOPY, GASTROSCOPY, DUODENOSCOPY (EGD), COMBINED N/A 6/24/2015    Procedure: COMBINED ESOPHAGOSCOPY, GASTROSCOPY, DUODENOSCOPY (EGD), REMOVE FOREIGN BODY;  Surgeon: Mandeep Park MD;  [facility]    ESOPHAGOSCOPY, GASTROSCOPY, DUODENOSCOPY (EGD), COMBINED N/A 10/25/2015    Procedure: COMBINED ESOPHAGOSCOPY, GASTROSCOPY, DUODENOSCOPY (EGD);  Surgeon: Sammy Amaro MD;  Location:  GI    ESOPHAGOSCOPY, GASTROSCOPY, DUODENOSCOPY (EGD), COMBINED N/A 10/25/2015    Procedure: COMBINED ESOPHAGOSCOPY, GASTROSCOPY, DUODENOSCOPY (EGD), BIOPSY SINGLE OR MULTIPLE;  Surgeon: Sammy Amaro MD;  Location:  GI    ESOPHAGOSCOPY, GASTROSCOPY, DUODENOSCOPY (EGD), COMBINED N/A 1/31/2023    Procedure: ESOPHAGOGASTRODUODENOSCOPY (EGD) with peg replacement ;  Surgeon: Mandeep Park MD;  [facility]    ESOPHAGOSCOPY, GASTROSCOPY, DUODENOSCOPY (EGD), DILATATION, COMBINED      EXCISE LESION TRUNK N/A 4/17/2017    Procedure: EXCISE LESION TRUNK;  Removal of  Abdominal Foreign Body;  Surgeon: Nestor Phoenix MD;  [facility]    HC ESOPH/GAS REFLUX TEST W NASAL IMPED >1 HR N/A 11/19/2015    Procedure: ESOPHAGEAL IMPEDENCE FUNCTION TEST WITH 24 HOUR PH GREATER THAN 1 HOUR;  Surgeon: Thiago Apple MD;  [facility]    HC UGI ENDOSCOPY DIAG W BIOPSY  9/17/08    HC UGI ENDOSCOPY DIAG W BIOPSY  9/27/12    HC UGI ENDOSCOPY W ESOPHAGEAL DILATION BALLOON <30MM  9/17/08    HC UGI ENDOSCOPY W EUS N/A 5/5/2015    Procedure: COMBINED ENDOSCOPIC ULTRASOUND, ESOPHAGOSCOPY, GASTROSCOPY, DUODENOSCOPY (EGD);  Surgeon: Wm Dueñas MD;  [facility]    HC WRIST ARTHROSCOP,RELEASE XVERS LIG Bilateral 12/17/08    INJECT TRANSVERSUS ABDOMINIS PLANE (TAP) BLOCK BILATERAL Left 9/22/2016    Procedure: INJECT TRANSVERSUS ABDOMINIS PLANE (TAP) BLOCK BILATERAL;  Surgeon: Dickson Corrigan MD;  [facility]    INJECT TRIGGER POINT Bilateral 9/8/2022    Procedure: Abdominal trigger point injection with ultrasound;  Surgeon: Monika Mahajan MD;  [facility]    INJECT TRIGGER POINT SINGLE / MULTIPLE 3 OR MORE MUSCLES Right 11/15/2022    Procedure: Trigger point injections right abdomen with ultrasound guidance;  Surgeon: Monika Mahajan MD;  [facility]    IR CHEST PORT PLACEMENT < 5 YRS OF AGE  6/10/2022    IR PORT REMOVAL RIGHT  11/9/2023    laparoscopic pineda  1995    LAPAROSCOPIC HERNIORRHAPHY INCISIONAL N/A 8/23/2018    Procedure: LAPAROSCOPIC HERNIORRHAPHY INCISIONAL;  Laparoscopic Incisional Hernia Repair with Symbotex Mesh Implant;  Surgeon: Nestor Phoenix MD;  [facility]    LAPAROSCOPIC PANCREATECTOMY, TRANSPLANT AUTO ISLET CELL N/A 12/28/2016    Procedure: LAPAROSCOPIC PANCREATECTOMY, TRANSPLANT AUTO ISLET CELL;  Surgeon: Nestor Phoenix MD;  [facility]    LAPAROTOMY EXPLORATORY N/A 1/31/2023    Procedure: Exploratory Laparotomy, lysis of adhesions, Perforated J-Junostomy Resection, Replace J-Junostomy site;  Surgeon: Elver aBuer,  MD;  [facility]    LAPAROTOMY, LYSIS ADHESIONS, COMBINED N/A 1/31/2023    Procedure: Dilatation of jejunostomy feeding tube, track with placement of jejunostomy tube with fluoroscopy;  Surgeon: Elver Bauer MD;  [facility]    REMOVE AND REPLACE BREAST IMPLANT PROSTHESIS N/A 12/30/2022    Procedure: Exploratory Laparotomy, lysis of adhesions, small bowel resection. Placement of gastric jejunostomy for enteral feeding.;  Surgeon: Elver Bauer MD;  [facility]    REMOVE GASTROSTOMY TUBE ADULT N/A 1/28/2022    Procedure: REMOVAL, GASTROSTOMY TUBE, ADULT;  Surgeon: Mandeep Park MD;  [facility]    REPLACE GASTROJEJUNOSTOMY TUBE, PERCUTANEOUS N/A 9/7/2021    Procedure: GASTROJEJUNOSTOMY TUBE PLACEMENT, PERCUTANEOUS, WITH GASTROPEXY;  Surgeon: Mandeep Park MD;  [facility]    REPLACE GASTROJEJUNOSTOMY TUBE, PERCUTANEOUS N/A 9/22/2021    Procedure: REPLACEMENT, GASTROJEJUNOSTOMY TUBE, PERCUTANEOUS;  Surgeon: Zackery Montoya MD;  [facility]    REPLACE GASTROJEJUNOSTOMY TUBE, PERCUTANEOUS N/A 11/22/2022    Procedure: REPLACEMENT, GASTROJEJUNOSTOMY TUBE, PERCUTANEOUS;  Surgeon: Mandeep Park MD;  [facility]    REPLACE GASTROJEJUNOSTOMY TUBE, PERCUTANEOUS N/A 12/9/2022    Procedure: REPLACEMENT, GASTROJEJUNOSTOMY TUBE, PERCUTANEOUS with ENDOSCOPIC SUTURING.;  Surgeon: Mandeep Park MD;  [facility]    REPLACE GASTROJEJUNOSTOMY TUBE, PERCUTANEOUS N/A 8/1/2023    Procedure: Replace Gastrojejunostomy Tube, Percutaneous;  Surgeon: Mandeep Park MD;  [facility]    REPLACE JEJUNOSTOMY TUBE, PERCUTANEOUS N/A 9/10/2021    Procedure: UPPER ENDOSCOPY, REPLACEMENT OF PERCUTANEOUS GASTROJEJUNOSTOMY TUBE, T-TAG GASTROPEXY;  Surgeon: Zackery Montoya MD;  [facility]    REPLACE JEJUNOSTOMY TUBE, PERCUTANEOUS N/A 12/29/2021    Procedure: REPLACEMENT, JEJUNOSTOMY TUBE, PERCUTANEOUS;  Surgeon: Mandeep Park  MD;  [facility]    REPLACE JEJUNOSTOMY TUBE, PERCUTANEOUS N/A 2023    Procedure: REPLACEMENT, JEJUNOSTOMY TUBE, PERCUTANEOUS;  Surgeon: Mandeep Park MD;  [facility]    REPLACE JEJUNOSTOMY TUBE, PERCUTANEOUS  2023    Procedure: Replace Jejunostomy Tube, Percutaneous;  Surgeon: Mandeep Park MD;  [facility]    transphenoidal pituitary resection      Z  DELIVERY ONLY      Z  DELIVERY ONLY      repeat c section with incidental cystotomy with repair    [facility] EXCIS PITUITARY,TRANSNASAL/SEPTAL      pituitary tumor removed for diabetes insipidus    Z TOTAL ABDOM HYSTERECTOMY      w/ bilateral salpingoophorectomy        Prior to Admission Medications   Prior to Admission Medications   Prescriptions Last Dose Informant Patient Reported? Taking?   Buprenorphine HCl (BELBUCA) 150 MCG FILM buccal film 2023 at am  Yes Yes   Sig: Place 150 mcg inside cheek every 12 hours   Digestive Enzyme Cartridge (RELIZORB) MARCO 2023 at 2000  No Yes   Si Cartridges daily   Insulin Glargine-yfgn (SEMGLEE, YFGN,) 100 UNIT/ML SOPN 2023 at unknown  No Yes   Sig: Inject 3 Units Subcutaneous every 24 hours   Nutritional Supplements (BOOST HIGH PROTEIN) LIQD   No No   Sig: After above baseline labs are drawn, give: 6 mL/kg to maximum of 360 mL; the beverage is to be consumed within 5 minutes.   STATIN NOT PRESCRIBED (INTENTIONAL)   Yes No   Sig: Previous liver issues, risks vs benefits felt to not warrant statin.    Discussed Oct 2022 visit   Sharps Container (BD SHARPS ) MISC   No No   Si Container as needed   ZOLMitriptan (ZOMIG-ZMT) 5 MG ODT 2023 at unknown  No Yes   Sig: Take 1 tablet (5 mg) by mouth at onset of headache for migraine Dissolve tablet in the mouth. May repeat in 2 hours. Max 2 tablets/24 hours.   acetaminophen (TYLENOL) 325 MG/10.15ML solution 2023 at pm  No Yes   Si.3 mLs (650 mg) by Per J Tube route  "every 6 hours as needed for mild pain or fever   amitriptyline (ELAVIL) 10 MG tablet 2023 at pm  No Yes   Sig: Take 4 tablets (40 mg) by mouth At Bedtime   amylase-lipase-protease (CREON) 47939-10167 units CPEP per EC capsule Past Month at unknown  No Yes   Sig: Take 3-4 capsules by mouth 3 times daily (with meals) With oral meals   baclofen (LIORESAL) 10 MG tablet 2023 at 1700  Yes Yes   Sig: Take 10 mg by mouth 3 times daily   cetirizine (ZYRTEC) 10 MG tablet More than a month at unknown  No Yes   Si tablet (10 mg) by Per G Tube route daily   diphenhydrAMINE (BENADRYL) 12.5 MG/5ML solution 2023 at am  Yes Yes   Sig: Take 25 mg by mouth 4 times daily as needed for other (nausea)   estradiol (VAGIFEM) 10 MCG TABS vaginal tablet 2023 at unknown  No Yes   Sig: INSERT 1 TABLET(10 MCG) VAGINALLY 2 TIMES A WEEK   Patient taking differently: INSERT 1 TABLET(10 MCG) VAGINALLY every third day   famotidine (PEPCID) 40 MG/5ML suspension 2023 at am  No Yes   Si.5 mLs (20 mg) by Per J Tube route daily   glucagon 1 MG kit 2023 at unknown  No Yes   Sig: Give 0.1 to 0.15mg( 10-15 units on Insulin sryinge) subcutaneous  every 15 minutes PRN for hypoglycemia. Remix new kit q24hr. Needs up to 3 kit/week.   glucose 40 % GEL gel Unknown at unknown  No Yes   Sig: Take 15-30 g by mouth every 15 minutes as needed for low blood sugar   insulin syringe-needle U-100 (30G X 1/2\" 0.3 ML) 30G X 1/2\" 0.3 ML miscellaneous   No No   Sig: Use 3 syringes daily or as directed.   levothyroxine (SYNTHROID/LEVOTHROID) 137 MCG tablet 2023 at am  Yes Yes   Si mcg by Per G Tube route daily   lidocaine (LIDODERM) 5 % patch Past Week at unknown  Yes Yes   Sig: Place onto the skin every 24 hours To prevent lidocaine toxicity, patient should be patch free for 12 hrs daily.   medical cannabis (Patient's own supply) 2023 at unknown  Yes Yes   Sig: Take 1 lozenge 4 MCT to 1 THC by mouth daily for pain and " "nausea   methocarbamol (ROBAXIN) 750 MG tablet 2023 at 1600  No Yes   Sig: Take 1 tablet (750 mg) by mouth 4 times daily as needed for muscle spasms   pantoprazole (PROTONIX) 40 mg IV push injection 2023 at am  No Yes   Sig: Inject 40 mg into the vein daily (with breakfast)   prochlorperazine (COMPAZINE) 10 MG tablet 2023 at am  Yes Yes   Sig: Take 10 mg by mouth every 6 hours as needed for nausea or vomiting   promethazine (PHENERGAN) 25 MG/ML IV injection 2023 at 1400  Yes Yes   Sig: Inject 25 mg into the muscle daily as needed for nausea or vomiting   prucalopride succinate (MOTEGRITY) 2 MG tablet 2023 at am  Yes Yes   Sig: Take 1 tablet (2 mg) by mouth daily Per G-tube   scopolamine (TRANSDERM) 1 MG/3DAYS 72 hr patch 2023 at unknown  No Yes   Sig: Place 1 patch onto the skin every 72 hours   sennosides (SENOKOT) 8.8 MG/5ML syrup 2023 at am  No Yes   Si mLs by Per J Tube route 2 times daily   silver nitrate (ARZOL SILVER NIT APPLICATORS) 75-25 % miscellaneous Past Month at unknown  No Yes   Sig: Apply topically as needed for wound care Apply Silver Nitrate Sticks every 2-3 days until granulation tissue resolved.  \"Roll\" tip of Silver Nitrate Q tip directly onto the granulation tissue-bulging tissue area.  Have Agency or Home Care Nurse administer this, if you prefer.  Take care not to touch any healthy tissue or skin.  The tissue will turn white and eventually black & scab off.  May repeat if needed in the future for recurrence.   thiamine (B-1) 100 MG tablet 2023 at pm  No Yes   Si tablet (100 mg) by Per J Tube route daily   zinc oxide - white petrolatum (CRITIC-AID THICK MOIST BARRIER) 20-51% PSTE topical paste 2023 at unknown  No Yes   Sig: Apply 71 g topically every hour as needed for rash (Under J tube bumper when needed for skin protection)      Facility-Administered Medications Last Administration Doses Remaining   Botulinum Toxin Type A (BOTOX) 200 " units injection 155 Units 9/25/2023  1:23 PM         Allergies   Allergies   Allergen Reactions    Apple Juice Anaphylaxis    Corticosteroids Other (See Comments)     All oral, IV and injectable steroids are contraindicated (unless in life threatening situations) in Islet Auto transplant recipients. They can cause irreversible loss of islet cell function. Please contact patient's transplant care coordinator ADI Gaffney RN at [phone]/pager [phone] and/or endocrinologist prior to administration.      Depakote [Divalproex Sodium] Other (See Comments)     Chest pain    Zithromax [Azithromycin Dihydrate] Anaphylaxis     Noted in 4/7/08 ER    Bromfenac      Other reaction(s): Headache    Codeine      Other reaction(s): Hallucinations    Darvocet [Propoxyphene N-Apap] Itching    Morphine Nausea and Vomiting and Rash    Nalbuphine Hcl Rash     RASH :nubaine    Zosyn [Piperacillin-Tazobactam In Dex] Rash     Possible allergy, did have a diffuse rash that seemed drug related but could have also been related to soap in the hospital.     Bactrim [Sulfamethoxazole W-Trimethoprim] Other (See Comments) and Nausea and Vomiting     Severely low liver function.    Statins Other (See Comments)     Previous liver issues, risks vs benefits felt to not warrant statin.  Discussed Oct 2022 visit    Tape [Adhesive Tape] Blisters    Tramadol     Zofran [Ondansetron] Other (See Comments)     migraine    Flagyl [Metronidazole] Hives and Rash       Social History   I have reviewed this patient's social history and updated it with pertinent information if needed. Dinora Mcghee  reports that she quit smoking about 31 years ago. Her smoking use included cigarettes. She started smoking about 38 years ago. She has a 3.00 pack-year smoking history. She does not have any smokeless tobacco history on file. She reports that she does not currently use alcohol after a past usage of about 3.0 - 6.0 standard drinks of alcohol per week. She  reports that she does not use drugs.    Family History   I have reviewed this patient's family history and updated it with pertinent information if needed.   Family History   Problem Relation Age of Onset    Lipids Mother     Hypertension Mother     Thyroid Disease Mother     Depression Mother     Angina Mother     GERD Mother     Skin Cancer Mother     Migraines Mother     Autoimmune Disease Mother     Hyperlipidemia Mother     Mental Illness Mother     Eye Disorder Father         cataract, detached retina    Myocardial Infarction Father 60    Lipids Father     Cerebrovascular Disease Father     Depression Father     Substance Abuse Father     Anesthesia Reaction Father         stroke right after surgery    Cataracts Father     Osteoarthritis Father     Ulcerative Colitis Father     Autoimmune Disease Father     Heart Disease Father     Hyperlipidemia Father     Mental Illness Father     Interpersonal Violence Sister     Coronary Artery Disease Sister         angioplasty    GERD Sister     Substance Abuse Sister     Depression Sister     Thyroid Disease Sister     Eye Disorder Maternal Grandmother         cataract    Thyroid Disease Maternal Grandmother     Diabetes Maternal Grandfather     Eye Disorder Paternal Grandmother         cataract    Diabetes Paternal Grandmother     Eye Disorder Paternal Grandfather         cataract    Diabetes Paternal Grandfather     Substance Abuse Paternal Grandfather     Eye Disorder Son         ptosis    Depression Son     Anxiety Disorder Son     Heart Disease Paternal Aunt     Diabetes Paternal Aunt     Diabetes Paternal Uncle     Heart Disease Paternal Uncle     Depression Nephew     Anxiety Disorder Nephew     Thyroid Disease Nephew     Diabetes Type 2  Cousin         paternal cousin    Autoimmune Disease Cousin     Autoimmune Disease Sister     Depression Sister     Mental Illness Sister     Substance Abuse Sister     Thyroid Disease Sister     Depression Son     Mental  Illness Son     Thyroid Disease Nephew        Review of Systems   The 10 point Review of Systems is negative other than noted in the HPI or here.     Physical Exam   Temp:  [97.6  F (36.4  C)-97.8  F (36.6  C)] 97.8  F (36.6  C)  Pulse:  [68-97] 82  Resp:  [15-20] 15  BP: (120-129)/(75-86) 123/82  SpO2:  [96 %-99 %] 99 %  Vitals:    11/08/23 1834   Weight: 56.7 kg (125 lb)      I/O last 3 completed shifts:  In: 280 [P.O.:120]  Out: 400 [Emesis/NG output:400]  Peripheral IV 11/12/23 Anterior;Left Lower forearm (Active)   Site Assessment [facility] 11/12/23 1000   Line Status Infusing 11/12/23 1000   Dressing Transparent 11/12/23 1000   Dressing Status clean;dry;intact 11/12/23 1000   Dressing Intervention New dressing  11/12/23 0500   Line Intervention Flushed 11/12/23 1000   Phlebitis Scale 0-->no symptoms 11/12/23 1000   Number of days: 0       Gastrostomy/Enterostomy Gastrostomy RUQ 1 18 fr re-placed byGilbert Park (Active)   Site Description [facility] 11/12/23 1000   Site care cleansed with soap and water;button rotated 1/4 turn 11/12/23 1000   Drainage Appearance Tan;Thin 11/12/23 1000   Status - Gastrostomy Open to gravity drainage 11/12/23 1000   Dressing Status Dressing Changed;Normal: Clean, Dry & Intact 11/12/23 1000   Dressing Change Due 11/12/23 11/12/23 1000   Flush/Free Water (mL) 90 mL 11/12/23 1000   Output (ml) 125 ml 11/12/23 1000   Number of days: 192       Gastrostomy/Enterostomy Jejunostomy LLQ 1 14 fr 25 cmof tube removed by Dr. park before replacement. Therefore 20cm beyond the balloon of feeding is used. (Active)   Site Description [facility] 11/12/23 1000   Site care cleansed with soap and water 11/12/23 1000   Drainage Appearance Lin;Thick 11/12/23 1000   Status - Jejunostomy Continuous Enteral Feedings 11/12/23 1000   Dressing Status Dressing Changed;Normal: Clean, Dry & Intact 11/12/23 1000   Dressing Change Due 11/12/23 11/12/23 1000   Relizorb Device Changed Yes 11/10/23 1800   Relizorb Device Change Time  1834 11/10/23 1800   Relizorb Device Change Is Due At 1834 11/10/23 1800   Intake (ml) 30 ml 11/12/23 1000   Flush/Free Water (mL) 90 mL 11/12/23 1000   Number of days: 192       Incision/Surgical Site 01/31/23 Abdomen (Active)   Number of days: 285     Vent Settings:  Resp: 15 SpO2: 99 % O2 Device: None (Room air)    Resp: 15    GENERAL: No acute distress  HEENT: EOMI  NECK: Supple and without lymphadenopathy. JVP estimated 5-6 cm of H2O  CV: S1/S2 heard without murmur   RESPIRATORY: Normal breath sounds, no wheezes or crackles noted  GI: Soft, nontender and nondistended. No palpable organomegaly. Bowel sounds active  EXTREMITIES: No lower extremity edema  NEUROLOGIC: Alert and orientated x 3. CN II-XII grossly intact. No focal deficits noted.   MUSCULOSKELETAL: No joint swelling or tenderness.   SKIN: No jaundice noted    Data   Data reviewed today:     Recent Labs   Lab 11/12/23  0936 11/12/23  0649 11/12/23  0610 11/11/23  0620 11/11/23  0615 11/10/23  0846 11/10/23  0716 11/09/23  1234 11/08/23 2011   WBC  --   --   --   --   --   --  5.2  --  9.7   HGB  --   --   --   --   --   --  12.2  --  11.6*   MCV  --   --   --   --   --   --  94  --  97   PLT  --   --   --   --   --   --  328  --  299   INR  --   --   --   --   --   --   --   --  1.11   NA  --  140  --   --  141  --  139  --  139   POTASSIUM  --  3.6  --   --  3.8  --  3.7  --  4.1   CHLORIDE  --  102  --   --  103  --  101  --  104   CO2  --  28  --   --  26  --  27  --  25   BUN  --  7.3  --   --  9.9  --  11.2  --  14.0   CR  --  0.53  --   --  0.50*  --  0.46*  --  0.51   ANIONGAP  --  10  --   --  12  --  11  --  10   KASSI  --  8.7  --   --  9.1  --  9.3  --  9.1   * 110* 116*   < > 85   < > 133*   < > 147*   ALBUMIN  --   --   --   --   --   --   --   --  4.3   PROTTOTAL  --   --   --   --   --   --   --   --  6.9   BILITOTAL  --   --   --   --   --   --   --   --  0.6   ALKPHOS  --   --   --   --   --   --   --   --  106*   ALT  --   --    --   --   --   --   --   --  45   AST  --   --   --   --   --   --   --   --  33    < > = values in this interval not displayed.       Recent Results (from the past 24 hour(s))   XR Surgery [facility] L/T 5 Min Fluoro w Stills    Narrative    This exam was marked as non-reportable because it will not be read by a   radiologist or a [place] non-radiologist provider.

## 2023-11-12 NOTE — PROGRESS NOTES
[facility] ID Service: Follow Up Note      Patient:  Dinora Mcghee, Date of birth 1965, Medical record number [id number]  Date of Visit:  November 10, 2023         Assessment and Recommendations:     Problem List:  Klebsiella pneumonia bacteremia  Blood cultures in 4/4 bottles on 11/8/2023 Pan-S Klebsiella pneumonia   Blood culture from right Port-A-Cath on 11/9/2023 pending  Right upper chest Port-A-Cath removed 11/9/2023  Concerns for native aortic valve endocarditis  TTE 11/10/23 with small echodense mass to the left coronary cusp of aortic valve  History of prior Pan-Sensitive Klebsiella pneumonia bacteremias  Admission @ [facility] 6/4/23-6/10/2023: Treated with ertapenem 1 g daily IV x14 days (6/7/2023 - 6/21/2023)  Admission @ [facility] 10/9/2023 - 10/13/2023: Patient did not want Port-A-Cath removed so salvage therapy with ceftriaxone 2 g daily IV with line lock x14 days (10/10/2023 - 10/24/2023)  Hx of choledochoduodenostomy, duodenojejunostomy, Vera-en-Y bypass reconstruction s/p J-tube for TF and G-tube for venting     Recommendations:  Continue ceftriaxone 2 g daily IV  Please obtain 2 new sets of blood cultures today  Please obtain Trans-esophageal ECHO to ensure no endocarditis     Discussion:  Ms Mcghee is a 58-year-old female with an extensive abdominal surgery history including chronic pancreatitis due to gallstones s/p TPIAT 12/2016, gastroparesis, choledochoduodenostomy, duodenojejunostomy, splenectomy, several bowel obstructions, s/p right upper chest Port-A-Cath for IVF/IV meds, J-tube for tube feeds nightly and G-tube for venting and 2 prior episodes of Klebsiella pneumonia bacteremia (6/7/23 treated with ertapenem 1 g daily IV x14 days and 10/9/2023 treated with ceftriaxone 2 g daily IV + line lock x14 days). She was initially admitted to [facility] on 11/8/2023 for abdominal discomfort, fevers and rigors where she was found to have recurrent Klebsiella pneumonia bacteremia.  She  is s/p right upper chest Port-A-Cath removal on 11/9/23.  Currently on ceftriaxone 2 g daily IV with repeat blood cultures pending.  TTE with small echodense mass to the left coronary cusp of aortic valve concerning for possible native valve aortic endocarditis so SHELLIE pending.     ID will continue to follow     Discussed with ID Attending, Dr Bartlett, and primary team    Arelis Shoemaker DO, PGY 4  Infectious Disease Fellow  [facility]  Pager: [phone]          Interval History:     No acute events overnight. On evaluation this am, she is resting on the bed accompanied by her 3 children.  Denies any fevers, chills, chest pain, diarrhea, emesis.  Reports to be in a better mood today.  Was updated of the plan still wait for transesophageal echocardiogram to rule out endocarditis.         Review of Systems:   Full 9 pt ROS obtained, pertinent positives and negatives as above.          Current Antimicrobials   Current:   Ceftriaxone 2g daily (11/9/23 -          Physical Exam:   Ranges for vital signs:  Temp:  [97.6  F (36.4  C)-98.3  F (36.8  C)] (P) 98.3  F (36.8  C)  Pulse:  [68-97] (P) 81  Resp:  [15-20] (P) 16  BP: (120-129)/(75-86) (P) 129/84  SpO2:  [96 %-99 %] (P) 99 %    Intake/Output Summary (Last 24 hours) at 11/10/2023 0936  Last data filed at 11/10/2023 0750  Gross per 24 hour   Intake 180 ml   Output 140 ml   Net 40 ml     Exam:  GENERAL: Chronically ill-appearing, thin   ENT:  Head is normocephalic, atraumatic. Oropharynx is moist without exudates or ulcers.  EYES:  Eyes have anicteric sclerae.    NECK:  Supple.  LUNGS: Slightly diminished breath sounds but clear to auscultation bilaterally  CARDIOVASCULAR:  Regular rate and rhythm with no murmurs, gallops or rubs.  ABDOMEN: Soft.  Tenderness to palpation around G-tube and J-tube.  No guarding, rigidity or rebound tenderness concerning for acute abdomen  EXT: Extremities warm and without edema.  SKIN:  No acute rashes.  Right upper chest  Port-A-Cath removed with dressing in place that is clean, dry, intact without any active drainage.  No erythema or warmth noted around G-tube sites  NEUROLOGIC:  Grossly nonfocal.         Laboratory Data:   Reviewed.  Pertinent for: Cr 0.5    Culture data:  Bcx (11/8/23) Klebsiella PNA (Pan-S)  Bcx (11/9/23) NGTD  Blood culture (11/11/2023): NGTD         Imaging:   CT abdomen pelvis W 11/8/2023: No abscess, colitis or free air

## 2023-11-12 NOTE — PROGRESS NOTES
2300 - 0700:    D: 58 year old female  presents with increased lower abdominal pain and increased nausea/vomiting. Now being treated for CLABSI Klebsiella pneumoniae bacteremia      I: Monitored vitals and assessed pt status.   Running: LR @ 75 mL/hr, TF@ 20mL since 2325.  PRN: Ativan x1    Neuro: A/Ox4. Slept well in between cares.  Cardiac:  VSS.  Respiratory: Sating >95% on room air.  GI/: Adequate urine output. No BM this shift. Complained of nausea overnight, given Ativan x1 with adequate relief.  Diet: Clear liquid diet, advanced as tolerated. Q4hr blood sugar checks.  Skin: R chest incision from port removal. No new skin deficits noted.  LDAs: New L PIV infusing LR @ 75 mL/hr.  Activity: Up ad koffi.  Pain: Denies.     A: VSS. Afebrile.     P: Continue to monitor Pt status and report changes to treatment team.

## 2023-11-12 NOTE — PLAN OF CARE
"/86 (BP Location: Left arm)   Pulse 97   Temp 97.7  F (36.5  C) (Oral)   Resp 20   Ht 1.727 m (5' 8\")   Wt 56.7 kg (125 lb)   SpO2 96%   BMI 19.01 kg/m      Care from: 1500 - 2330    VS & Pain: VSS on RA. Rated pain at 3, decline interventions   Neuro: AxO x4  Respiratory: denies shortness of breath   Cardiac: wdl, no acute distress noted   Peripheral neurovascular: denies numbness & tingling   GI/: + bowel sounds. BM x1 this shift. Voids spontaneously   Nutrition: clears. On TF running at 20 mL/hr.  Advance rate by 10 mL Q8hrs as tolerated, to goal rate of 45 mL/hr. J tube only for liquid meds & TF. G tube for crush meds   Skin: no new skin issue, incision on R chest from port removal   Musculoskeletal: wdl   Lines: R. PIV infusing LR @ 75 mL/hr   Activity: up ad koffi     Plan: continue plan of care    Goal Outcome Evaluation:  Plan of Care Reviewed With: patient  Overall Patient Progress: no change      "

## 2023-11-13 ENCOUNTER — APPOINTMENT (OUTPATIENT)
Dept: GENERAL RADIOLOGY | Facility: CLINIC | Age: 58
End: 2023-11-13
Payer: COMMERCIAL

## 2023-11-13 LAB
BACTERIA SPEC CULT: ABNORMAL
GLUCOSE BLDC GLUCOMTR-MCNC: 132 MG/DL (ref 70–99)
GLUCOSE BLDC GLUCOMTR-MCNC: 148 MG/DL (ref 70–99)
GLUCOSE BLDC GLUCOMTR-MCNC: 79 MG/DL (ref 70–99)
GLUCOSE BLDC GLUCOMTR-MCNC: 81 MG/DL (ref 70–99)
GLUCOSE BLDC GLUCOMTR-MCNC: 82 MG/DL (ref 70–99)
GLUCOSE BLDC GLUCOMTR-MCNC: 93 MG/DL (ref 70–99)
TSH SERPL DL<=0.005 MIU/L-ACNC: 0.89 UIU/ML (ref 0.3–4.2)

## 2023-11-13 PROCEDURE — 71045 X-RAY EXAM CHEST 1 VIEW: CPT | Mod: 26 | Performed by: RADIOLOGY

## 2023-11-13 PROCEDURE — 999N000065 XR CHEST PORT 1 VIEW

## 2023-11-13 PROCEDURE — 250N000013 HC RX MED GY IP 250 OP 250 PS 637: Performed by: INTERNAL MEDICINE

## 2023-11-13 PROCEDURE — 250N000013 HC RX MED GY IP 250 OP 250 PS 637

## 2023-11-13 PROCEDURE — 99233 SBSQ HOSP IP/OBS HIGH 50: CPT | Mod: 24 | Performed by: STUDENT IN AN ORGANIZED HEALTH CARE EDUCATION/TRAINING PROGRAM

## 2023-11-13 PROCEDURE — 250N000011 HC RX IP 250 OP 636: Mod: JZ

## 2023-11-13 PROCEDURE — 250N000011 HC RX IP 250 OP 636

## 2023-11-13 PROCEDURE — 99232 SBSQ HOSP IP/OBS MODERATE 35: CPT | Mod: GC | Performed by: INTERNAL MEDICINE

## 2023-11-13 PROCEDURE — G0463 HOSPITAL OUTPT CLINIC VISIT: HCPCS

## 2023-11-13 PROCEDURE — 272N000451 HC KIT SHRLOCK 5FR POWER PICC DOUBLE LUMEN

## 2023-11-13 PROCEDURE — 258N000003 HC RX IP 258 OP 636: Performed by: EMERGENCY MEDICINE

## 2023-11-13 PROCEDURE — 250N000013 HC RX MED GY IP 250 OP 250 PS 637: Performed by: HOSPITALIST

## 2023-11-13 PROCEDURE — 258N000003 HC RX IP 258 OP 636

## 2023-11-13 PROCEDURE — 200N000002 HC R&B ICU UMMC

## 2023-11-13 PROCEDURE — 36569 INSJ PICC 5 YR+ W/O IMAGING: CPT

## 2023-11-13 PROCEDURE — 250N000011 HC RX IP 250 OP 636: Performed by: EMERGENCY MEDICINE

## 2023-11-13 PROCEDURE — 250N000009 HC RX 250

## 2023-11-13 PROCEDURE — C9113 INJ PANTOPRAZOLE SODIUM, VIA: HCPCS | Mod: JZ

## 2023-11-13 PROCEDURE — 250N000009 HC RX 250: Performed by: HOSPITALIST

## 2023-11-13 RX ORDER — HEPARIN SODIUM,PORCINE 10 UNIT/ML
5-20 VIAL (ML) INTRAVENOUS EVERY 24 HOURS
Status: DISCONTINUED | OUTPATIENT
Start: 2023-11-13 | End: 2023-11-15 | Stop reason: HOSPADM

## 2023-11-13 RX ORDER — HEPARIN SODIUM,PORCINE 10 UNIT/ML
5-20 VIAL (ML) INTRAVENOUS
Status: DISCONTINUED | OUTPATIENT
Start: 2023-11-13 | End: 2023-11-15 | Stop reason: HOSPADM

## 2023-11-13 RX ORDER — SODIUM CHLORIDE, SODIUM LACTATE, POTASSIUM CHLORIDE, CALCIUM CHLORIDE 600; 310; 30; 20 MG/100ML; MG/100ML; MG/100ML; MG/100ML
INJECTION, SOLUTION INTRAVENOUS CONTINUOUS
Status: DISCONTINUED | OUTPATIENT
Start: 2023-11-13 | End: 2023-11-14

## 2023-11-13 RX ORDER — LIDOCAINE 40 MG/G
CREAM TOPICAL
Status: DISCONTINUED | OUTPATIENT
Start: 2023-11-13 | End: 2023-11-15 | Stop reason: HOSPADM

## 2023-11-13 RX ADMIN — LIDOCAINE HYDROCHLORIDE 3 ML: 10 INJECTION, SOLUTION EPIDURAL; INFILTRATION; INTRACAUDAL; PERINEURAL at 15:30

## 2023-11-13 RX ADMIN — PROMETHAZINE HYDROCHLORIDE 25 MG: 25 INJECTION INTRAMUSCULAR; INTRAVENOUS at 09:55

## 2023-11-13 RX ADMIN — SENNOSIDES 5 ML: 8.8 LIQUID ORAL at 09:34

## 2023-11-13 RX ADMIN — BACLOFEN 20 MG: 10 TABLET ORAL at 09:21

## 2023-11-13 RX ADMIN — ACETAMINOPHEN 650 MG: 325 SOLUTION ORAL at 09:33

## 2023-11-13 RX ADMIN — DIPHENHYDRAMINE HYDROCHLORIDE 25 MG: 25 SOLUTION ORAL at 12:55

## 2023-11-13 RX ADMIN — LEVOTHYROXINE SODIUM 137 MCG: 0.11 TABLET ORAL at 09:21

## 2023-11-13 RX ADMIN — FAMOTIDINE 20 MG: 40 POWDER, FOR SUSPENSION ORAL at 22:25

## 2023-11-13 RX ADMIN — ACETAMINOPHEN 650 MG: 325 SOLUTION ORAL at 02:05

## 2023-11-13 RX ADMIN — Medication 5 ML: at 17:48

## 2023-11-13 RX ADMIN — SODIUM CHLORIDE, SODIUM LACTATE, POTASSIUM CHLORIDE, CALCIUM CHLORIDE: 600; 310; 30; 20 INJECTION, SOLUTION INTRAVENOUS at 20:53

## 2023-11-13 RX ADMIN — BUPRENORPHINE HYDROCHLORIDE 150 MCG: 150 FILM, SOLUBLE BUCCAL at 11:07

## 2023-11-13 RX ADMIN — DRONABINOL 2.5 MG: 5 SOLUTION ORAL at 11:07

## 2023-11-13 RX ADMIN — DRONABINOL 2.5 MG: 5 SOLUTION ORAL at 23:39

## 2023-11-13 RX ADMIN — BACLOFEN 20 MG: 10 TABLET ORAL at 18:33

## 2023-11-13 RX ADMIN — SENNOSIDES 10 ML: 8.8 LIQUID ORAL at 23:39

## 2023-11-13 RX ADMIN — CEFTRIAXONE SODIUM 2 G: 2 INJECTION, POWDER, FOR SOLUTION INTRAMUSCULAR; INTRAVENOUS at 06:08

## 2023-11-13 RX ADMIN — SODIUM CHLORIDE, POTASSIUM CHLORIDE, SODIUM LACTATE AND CALCIUM CHLORIDE: 600; 310; 30; 20 INJECTION, SOLUTION INTRAVENOUS at 20:53

## 2023-11-13 RX ADMIN — PROMETHAZINE HYDROCHLORIDE 25 MG: 25 INJECTION INTRAMUSCULAR; INTRAVENOUS at 18:31

## 2023-11-13 RX ADMIN — AMITRIPTYLINE HYDROCHLORIDE 40 MG: 10 TABLET, FILM COATED ORAL at 22:24

## 2023-11-13 RX ADMIN — ACETAMINOPHEN 650 MG: 325 SOLUTION ORAL at 12:54

## 2023-11-13 RX ADMIN — SCOPALAMINE 1 PATCH: 1 PATCH, EXTENDED RELEASE TRANSDERMAL at 09:44

## 2023-11-13 RX ADMIN — HYDROMORPHONE HYDROCHLORIDE 0.3 MG: 1 INJECTION, SOLUTION INTRAMUSCULAR; INTRAVENOUS; SUBCUTANEOUS at 17:44

## 2023-11-13 RX ADMIN — ACETAMINOPHEN 650 MG: 325 SOLUTION ORAL at 22:24

## 2023-11-13 RX ADMIN — DIPHENHYDRAMINE HYDROCHLORIDE 25 MG: 25 SOLUTION ORAL at 05:22

## 2023-11-13 RX ADMIN — BUPRENORPHINE HYDROCHLORIDE 150 MCG: 150 FILM, SOLUBLE BUCCAL at 22:24

## 2023-11-13 RX ADMIN — BACLOFEN 10 MG: 10 TABLET ORAL at 22:25

## 2023-11-13 RX ADMIN — Medication 15 ML: at 09:34

## 2023-11-13 RX ADMIN — PANTOPRAZOLE SODIUM 40 MG: 40 INJECTION, POWDER, FOR SOLUTION INTRAVENOUS at 09:23

## 2023-11-13 ASSESSMENT — ACTIVITIES OF DAILY LIVING (ADL)
ADLS_ACUITY_SCORE: 37

## 2023-11-13 NOTE — PROGRESS NOTES
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 Now being treated for CLABSI Klebsiella pneumoniae bacteremia.     Changes today:   - SHELLIE scheduled for AM on 11/14. NPO at [place] including tube feeds.   - PICC to be placed   - WOC consult for port wound     # Bacteremia   # H/o CLABSI Klebsiella pneumoniae bacteremia x2   Blood cultures growing gram negative bacilli. Was previously admitted twice for Klebsiella bacteremia, first 6/23/23 requiring 14-day course of ertapenem. Was then admitted to [facility] for TPN-associated CLABSI Klebsiella pneumoniae infection leading to septic shock. Blood cultures positive on 10/9, first negative 10/11. Received ceftriaxone w/ antibiotic lock therapy to save port. Patient followed with infectious disease who stated that port site may be source of re-seeding bloodstream. Also has history of direct jejunostomy site infection. Patient afebrile and without leukocytosis on admission. Source of infection most likely open port site. Port line removed 11/9. Echo on 11/8 with echodensity on the cusp of the aortic valve that could be consistent with vegetation, so SHELLIE is indicated.   - Infectious disease consult, appreciate recs:    - Repeat blood cultures 11/11/23: NGTD   - Obtain SHELLIE to r/o endocarditis, consulted cardiology, scheduled for AM on 11/14    - Continue ceftriaxone- appears to have clinical response.  "Tentative duration 2 weeks from first set of blood cultures (end date: 11/22/23)    - Okay to start TPN after line holiday      # Intractable nausea/vomiting   # Abdominal Pain   # Gastroparesis    Follows with Dr. Mandeep Park with GI, and [facility]. 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 CT A/P without acute process. J tube malfunction on night of 11/10. Replaced 11/11.   Patient would like to start PPN or TPN due to feeling \"much better\" on it in the past in terms of energy.   - Diet:    - Replaced PEJ with panc-hollie team on 11/11    - May restart J tube feeds , up-titrating as tolerated   - Advance diet as tolerated: Clear liquid diet    - G-tube to gravity  - Nutrition consulted:   - After line holiday, could restart TPN.   - PTA thiamine   - Bowel reg:    - Fleet enemas PRN (patient prefers over suppositories)    - Bisacodyl tabs in G-tube (per patient's PTA regimen)    - Sennosides BID scheduled  - Discontinued methyl natrexone (two doses, 11/10 and 11/12 for possible opioid induced constipation), as constipation likely represents patient's baseline low gastric motility  - For nausea:    - Continue PTA IV phenergan   - Continue scopolamine patch    - Continue compazine    - Creon with meals   - Continue Lorazepam q6h injection PRN    - PTA PPI: Protonix AM and Famotidine per J tube   - Pain:    - Dilaudid 0.3mg q3 PRN    - Acetaminophen q6h scheduled   - Start dronabinol due to PTA medical marijuana   - PTA buprenorphine 150 mcg q12h   - PTA baclofen  - Health psychology consult for distress related to medical condition      # Insulin-dependent diabetes mellitus   Patient reports using 7u Lantus daily while on tube feeds.   -Continue 3u lantus/day   -LSSI     # " Hypothyroidism  -Continue PTA levothyroxine         Diet: Adult Formula Drip Feeding: Continuous Vital 1.5; Jejunostomy; Goal Rate: 10 mL/hr (TRICKLE ONLY) 11/10 per MD. On 11/11, begin rate advancement by 10 mL Q8hrs as tolerated, to goal rate of 45 mL/hr. Attach Relizorb with TFs, per pt care order (se...  NPO per Anesthesia Guidelines for Procedure/Surgery Except for: Meds    DVT Prophylaxis: Pneumatic Compression Devices  Piedra Catheter: Not present  Lines: PIV  Cardiac Monitoring: None  Code Status: No CPR- Pre-arrest intubation OK      Clinically Significant Risk Factors                                    Disposition Plan       Disposition currently contingent on restarting TPN and SHELLIE.     Ammy Bolden MD  Medicine Service, [facility] TEAM [address] [place] [address]  Securely message with [employer] (more info)  Text page via [facility] Paging/Directory   See signed in provider for up to date coverage information      Discussed with the attending, Dr. Dixon.     ______________________________________________________________________    Interval History   Nursing notes reviewed.  Patient feels backed up. No bowel movement.   Having some pain and itching with port site.   NPO for SHELLIE.   PICC to be placed today.   Feeling better than past few days.      Physical Exam   Vital Signs: Temp: 97.8  F (36.6  C) Temp src: Oral BP: (!) 127/90 Pulse: 95   Resp: 18 SpO2: 96 % O2 Device: None (Room air)    Weight: 125 lbs 0 oz    General: Sitting in bed, no acute distress   HEENT: Normocephalic, atraumatic, moist mucus membranes   Cardiovascular: RRR, no M/R/G   Respiratory: Normal WOB   Abdominal: Very mild tenderness in suprapubic area. No rebound or guarding. Soft. Distant bowel sounds.   Neuro: alert and oriented, no focal neuro deficits   Psych: appropriate       Data   Recent Results (from the past 24 hour(s))   Glucose by meter    Collection Time: 11/12/23  6:58 PM   Result Value Ref Range     GLUCOSE BY METER POCT 126 (H) 70 - 99 mg/dL   Glucose by meter    Collection Time: 11/12/23 10:23 PM   Result Value Ref Range    GLUCOSE BY METER POCT 135 (H) 70 - 99 mg/dL   Glucose by meter    Collection Time: 11/13/23  2:02 AM   Result Value Ref Range    GLUCOSE BY METER POCT 132 (H) 70 - 99 mg/dL   Glucose by meter    Collection Time: 11/13/23  6:05 AM   Result Value Ref Range    GLUCOSE BY METER POCT 93 70 - 99 mg/dL   Glucose by meter    Collection Time: 11/13/23 10:15 AM   Result Value Ref Range    GLUCOSE BY METER POCT 148 (H) 70 - 99 mg/dL

## 2023-11-13 NOTE — PROGRESS NOTES
"Neuro: A&Ox4.   Cardiac: SR. VSS.       Respiratory: Sating 97% on RA.  GI/: Adequate urine output. Last BM 11/8  Diet/appetite:  CLD, pt does not tolerate much by mouth  Activity:  Independent, up to chair and in halls.  Pain: managed with PRN dilaudid and scheduled tylenol, endorses pain at port removal site and lower abdominal pain   Skin: Port removal site R chest, covered with guaze and medipore tape, red, swollen, and inflamed, leaking serous fluid  LDA's: R PIV infusing LR @ 75 ml/hr  G tube: gravity drainage and all crushed meds  J tube: TF @ 30ml/hr (can advance to 40 ml/hr at 1930), 60 ml flush q 4, all liquid meds     /88 (BP Location: Left arm)   Pulse 75   Temp 98  F (36.7  C) (Oral)   Resp 22   Ht 1.727 m (5' 8\")   Wt 56.7 kg (125 lb)   SpO2 97%   BMI 19.01 kg/m        Plan: Continue with POC. Notify primary team with changes.           "

## 2023-11-13 NOTE — PLAN OF CARE
"Goal Outcome Evaluation:  Shift: 0700 - 1530  Reason for admission: Bacteremia/Abdominal pain with Nausea/Vomiting    Vitals: Vital signs:  Temp: 97.8  F (36.6  C) Temp src: Oral BP: (!) 127/90 Pulse: 95   Resp: 18 SpO2: 96 % O2 Device: None (Room air) Oxygen Delivery: 2 LPM Height: 172.7 cm (5' 8\") Weight: 56.7 kg (125 lb)  Estimated body mass index is 19.01 kg/m  as calculated from the following:    Height as of this encounter: 1.727 m (5' 8\").    Weight as of this encounter: 56.7 kg (125 lb).    Activity: Independent, Falls Risk   Musk: WDL  Pain: R Abdomen - Tylenol, R old port site itchy - Benadryl  Cardiac: WDL  Respiratory: WDL  GI: Audible noises all four quadrants, RUQ hypoactive, Liquid meds in J-tube, Crushed meds in  G-Tube   : WDL  Skin: Old R port red & itchy - WOC nurse consult orders placed   Diet: NPO this morning, advanced to regular diet, patient to be NPO at midnight for SHELLIE 11/14  Lines:  L PIV Saline Locked, PICC Placed L Arm - X RAY at bedside, Waiting on Verification  Wounds/Incisions: Old R Port Access  Labs/imaging/consults: Glucose High - Patient denied Novolog  Patient consulting with Infectious Disease & Cardiology  Plan: Concern for aortic valve endocarditis - Scheduled SHELLIE for 11/14 NPO @ Midnight, Continuing Rocephin for Bacteremia    Venus Devries RN BSN PHN                      "

## 2023-11-13 NOTE — PROGRESS NOTES
"../79 (BP Location: Left arm)   Pulse 75   Temp 97.6  F (36.4  C) (Oral)   Resp 16   Ht 1.727 m (5' 8\")   Wt 56.7 kg (125 lb)   SpO2 97%   BMI 19.01 kg/m        ..  VS & Pain: VSS. Abd pain and pain at old port site. PRN dilaudid and Prn benadryl for itchiness  Neuro: A&Ox4  Respiratory: WDL  Cardiac: WDL  Peripheral neurovascular: WDL  GI/:  G-J tube.. Crushed meds through G tube.  Nutrition:  Tube feeds stopped at midnight. NPO for procedure.   Skin:  Old port site red and inflammed  Musculoskeletal: WDL  Lines: PIV-SL  Activity: IND     Plan: continue plan of care. PICC insertion today. SHELLIE today. Possibly start TPN?          "

## 2023-11-13 NOTE — PROGRESS NOTES
[facility] ID Service: Follow Up Note      Patient:  Dinora Mcghee, Date of birth 1965, Medical record number [id number]  Date of Visit:  November 10, 2023         Assessment and Recommendations:     Problem List:  Klebsiella pneumonia bacteremia  Blood cultures in 4/4 bottles on 11/8/2023 Pan-S Klebsiella pneumonia   Blood culture from right Port-A-Cath on 11/9/2023 Klebsiella pneumonia  Right upper chest Port-A-Cath removed 11/9/2023  Concerns for native aortic valve endocarditis  TTE 11/10/23 with small echodense mass to the left coronary cusp of aortic valve  History of prior Pan-Sensitive Klebsiella pneumonia bacteremias  Admission @ [facility] 6/4/23-6/10/2023: Treated with ertapenem 1 g daily IV x14 days (6/7/2023 - 6/21/2023)  Admission @ [facility] 10/9/2023 - 10/13/2023: Patient did not want Port-A-Cath removed so salvage therapy with ceftriaxone 2 g daily IV with line lock x14 days (10/10/2023 - 10/24/2023)  Hx of choledochoduodenostomy, duodenojejunostomy, Vera-en-Y bypass reconstruction s/p J-tube for TF and G-tube for venting     Recommendations:  Continue ceftriaxone 2 g daily IV  Follow repeat blood cultures  Await transesophageal echocardiogram for evaluation of aortic valve endocarditis     Discussion:  Ms Mcghee is a 58-year-old female with an extensive abdominal surgery history including chronic pancreatitis due to gallstones s/p TPIAT 12/2016, gastroparesis, choledochoduodenostomy, duodenojejunostomy, splenectomy, several bowel obstructions, s/p right upper chest Port-A-Cath for IVF/IV meds, J-tube for tube feeds nightly and G-tube for venting and 2 prior episodes of Klebsiella pneumonia bacteremia (6/7/23 treated with ertapenem 1 g daily IV x14 days and 10/9/2023 treated with ceftriaxone 2 g daily IV + line lock x14 days). She was initially admitted to [facility] on 11/8/2023 for abdominal discomfort, fevers and rigors where she was found to have recurrent Klebsiella pneumonia  bacteremia secondary to infected port s/p right upper chest Port-A-Cath removal on 11/9/23.  Currently on ceftriaxone 2 g daily IV with repeat blood cultures pending.  TTE with small echodense mass to the left coronary cusp of aortic valve concerning for possible native valve aortic endocarditis so SHELLIE pending.     ID will continue to follow     Discussed with ID Attending, Dr Blevins, and primary team    Arelis Shoemaker DO, PGY 4  Infectious Disease Fellow  [facility]  Pager: [phone]          Interval History:     No acute events overnight. On evaluation this am, she is resting on the bed accompanied by her 2 children and .  Denies any fevers, chills, chest pain, diarrhea, emesis.         Review of Systems:   Full 9 pt ROS obtained, pertinent positives and negatives as above.          Current Antimicrobials   Current:   Ceftriaxone 2g daily (11/9/23 -          Physical Exam:   Ranges for vital signs:  Temp:  [97.6  F (36.4  C)-98.3  F (36.8  C)] 97.8  F (36.6  C)  Pulse:  [74-95] 95  Resp:  [16-22] 18  BP: (111-132)/(68-90) 127/90  SpO2:  [96 %-99 %] 96 %    Intake/Output Summary (Last 24 hours) at 11/10/2023 0936  Last data filed at 11/10/2023 0750  Gross per 24 hour   Intake 180 ml   Output 140 ml   Net 40 ml     Exam:  GENERAL: Chronically ill-appearing, thin   ENT:  Head is normocephalic, atraumatic. Oropharynx is moist without exudates or ulcers.  EYES:  Eyes have anicteric sclerae.    NECK:  Supple.  LUNGS: Slightly diminished breath sounds but clear to auscultation bilaterally  CARDIOVASCULAR:  Regular rate and rhythm with no murmurs, gallops or rubs.  ABDOMEN: Soft.  Tenderness to palpation around G-tube and J-tube.  No guarding, rigidity or rebound tenderness concerning for acute abdomen  EXT: Extremities warm and without edema.  SKIN:  No acute rashes.  Right upper chest Port-A-Cath removed with dressing in place that is clean, dry, intact without any active drainage.  No  erythema or warmth noted around G-tube sites  NEUROLOGIC:  Grossly nonfocal.         Laboratory Data:   Reviewed.  Pertinent for: Cr 0.5    Culture data:  Bcx (11/8/23) Klebsiella PNA (Pan-S)  Bcx (11/9/23) NGTD  Blood culture (11/11/2023): NGTD         Imaging:   CT abdomen pelvis W 11/8/2023: No abscess, colitis or free air

## 2023-11-13 NOTE — CONSULTS
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 Now being treated for CLABSI Klebsiella pneumoniae bacteremia.     Assessment:      Areas visualized during today's visit:  Right upper chest    Wound location: Right upper chest    Last photo: 11/13/23  Wound due to: Surgical Wound  Wound history/plan of care: old port site. Port line removed 11/9/23 due suspected source of recurrent infections.   Wound base: 100 % dermis,      Palpation of the wound bed: firm      Drainage: moderate     Description of drainage: serous and cloudy     Measurements (length x width x depth, in cm): Two linear incisions  3  x 0.2  x  0.2 cm and 1.5 x 0.3 x 0.3     Tunneling: N/A     Undermining: N/A  Periwound skin: Indurated      Color: pink and red      Temperature: normal   Odor: none  Pain: mild,  itching  Pain interventions prior to dressing change: slow and gentle cares   Treatment goal: Heal  and Infection control/prevention  STATUS: initial assessment  Supplies ordered: ordered aquacel AG       Treatment Plan:     Right Upper Chest wound(s): Daily and PRN  Cleanse with wound cleanser  Apply no sting to periwound and allow to dry  Cut small square of aquacel (#[id number]) to fit over incisions  Cover with 4 x 4 Mepilex flex (#[id number])      Orders: Written    RECOMMEND PRIMARY TEAM ORDER: None, at this time  Education provided: plan of care and Infection  prevention   Discussed plan of care with: Patient and Nurse  WOC nurse follow-up plan: weekly  Notify WOC if wound(s) deteriorate.  Nursing to notify the Provider(s) and re-consult the WOC Nurse if new skin concern.    DATA:     Current support surface: Standard  Standard gel/foam mattress (IsoFlex, Atmos air, etc)  Containment of urine/stool: Incontinence Protocol  BMI: Body mass index is 19.01 kg/m .   Active diet order: Orders Placed This Encounter      NPO per Anesthesia Guidelines for Procedure/Surgery Except for: Meds     Output: I/O last 3 completed shifts:  In: -   Out: 800 [Emesis/NG output:800]     Labs:   Recent Labs   Lab 11/10/23  0716 11/08/23 2011   ALBUMIN  --  4.3   HGB 12.2 11.6*   INR  --  1.11   WBC 5.2 9.7     Pressure injury risk assessment:   Sensory Perception: 4-->no impairment  Moisture: 4-->rarely moist  Activity: 3-->walks occasionally  Mobility: 3-->slightly limited  Nutrition: 2-->probably inadequate  Friction and Shear: 3-->no apparent problem  Martinez Score: 19    Tania Holland RN CWOCN  Please contact through MyFrontStepsailyn group: [facility] Nurse  Dept. Office Number: [phone]

## 2023-11-13 NOTE — PROVIDER NOTIFICATION
Paged Resident: Consult with Attending regarding steroid cream for Port removal dressing. Resident also stated Vascular will do port access, and cardiology will try and fit patient in for SHELLIE later in the day.

## 2023-11-13 NOTE — PROCEDURES
[facility]    Double Lumen PICC Placement    Date/Time: 11/13/2023 3:27 PM    Performed by: Angelica Orellana RN  Authorized by: Ammy Bolden MD  Indications: vascular access      UNIVERSAL PROTOCOL   Site Marked: Yes  Prior Images Obtained and Reviewed:  Yes  Required items: Required blood products, implants, devices and special equipment available    Patient identity confirmed:  Verbally with patient, arm band and hospital-assigned identification number  NA - No sedation, light sedation, or local anesthesia (lidocaine was given-local anesthesia)  Confirmation Checklist:  Patient's identity using two indicators, relevant allergies, procedure was appropriate and matched the consent or emergent situation and correct equipment/implants were available  Time out: Immediately prior to the procedure a time out was called    Universal Protocol: the Joint Commission Universal Protocol was followed    Preparation: Patient was prepped and draped in usual sterile fashion       ANESTHESIA    Anesthesia:  Local infiltration  Local Anesthetic:  Lidocaine 1% without epinephrine  Anesthetic Total (mL):  3      SEDATION    Patient Sedated: No        Preparation: skin prepped with ChloraPrep  Skin prep agent: skin prep agent completely dried prior to procedure  Sterile barriers: maximum sterile barriers were used: cap, mask, sterile gown, sterile gloves, and large sterile sheet  Hand hygiene: hand hygiene performed prior to central venous catheter insertion  Type of line used: PICC  Catheter type: double lumen  Lumen type: non-valved and power PICC  Lumen Identification: Purple and Red  Catheter size: 5 Fr  Brand: Bard  Lot number: [id number]  Placement method: venipuncture, MST, ultrasound and tip navigation system  Number of attempts: 1  Difficulty threading catheter: no  Successful placement: yes  Orientation: left  Catheter to Vein (%): 29  Location: basilic vein  Tip Location:  SVC  Site rationale: Pt's preference; has previous port right chest wall removed 11/9/23  Arm circumference: adults 10 cm  Extremity circumference: 27  Visible catheter length: 0  Total catheter length: 43  Dressing and securement: adhesive securement device, blood cleaned with CHG, chlorhexidine disc applied, dressing applied, glue, site cleansed, statlock, sterile dressing applied and transparent dressing (IV 3000)  Post procedure assessment: blood return through all ports, free fluid flow and placement verified by x-ray  PROCEDURE   Patient Tolerance:  Patient tolerated the procedure well with no immediate complicationsDescribe Procedure: Pending chest xray for PICC tip confirmation  Disposal: sharps and needle count correct at the end of procedure, needles and guidewire disposed in sharps container

## 2023-11-14 ENCOUNTER — APPOINTMENT (OUTPATIENT)
Dept: CARDIOLOGY | Facility: CLINIC | Age: 58
End: 2023-11-14
Payer: COMMERCIAL

## 2023-11-14 LAB
BACTERIA BLD CULT: NO GROWTH
BACTERIA BLD CULT: NO GROWTH
GLUCOSE BLDC GLUCOMTR-MCNC: 110 MG/DL (ref 70–99)
GLUCOSE BLDC GLUCOMTR-MCNC: 111 MG/DL (ref 70–99)
GLUCOSE BLDC GLUCOMTR-MCNC: 118 MG/DL (ref 70–99)
GLUCOSE BLDC GLUCOMTR-MCNC: 121 MG/DL (ref 70–99)
GLUCOSE BLDC GLUCOMTR-MCNC: 163 MG/DL (ref 70–99)
GLUCOSE BLDC GLUCOMTR-MCNC: 89 MG/DL (ref 70–99)
LVEF ECHO: NORMAL

## 2023-11-14 PROCEDURE — 250N000011 HC RX IP 250 OP 636: Mod: JZ

## 2023-11-14 PROCEDURE — 250N000011 HC RX IP 250 OP 636: Performed by: EMERGENCY MEDICINE

## 2023-11-14 PROCEDURE — 258N000003 HC RX IP 258 OP 636: Performed by: INTERNAL MEDICINE

## 2023-11-14 PROCEDURE — 93325 DOPPLER ECHO COLOR FLOW MAPG: CPT

## 2023-11-14 PROCEDURE — 250N000013 HC RX MED GY IP 250 OP 250 PS 637

## 2023-11-14 PROCEDURE — 250N000013 HC RX MED GY IP 250 OP 250 PS 637: Performed by: HOSPITALIST

## 2023-11-14 PROCEDURE — 93312 ECHO TRANSESOPHAGEAL: CPT | Mod: 26 | Performed by: INTERNAL MEDICINE

## 2023-11-14 PROCEDURE — 99152 MOD SED SAME PHYS/QHP 5/>YRS: CPT | Performed by: INTERNAL MEDICINE

## 2023-11-14 PROCEDURE — 258N000001 HC RX 258: Performed by: INTERNAL MEDICINE

## 2023-11-14 PROCEDURE — 258N000003 HC RX IP 258 OP 636: Performed by: EMERGENCY MEDICINE

## 2023-11-14 PROCEDURE — 93312 ECHO TRANSESOPHAGEAL: CPT

## 2023-11-14 PROCEDURE — 93325 DOPPLER ECHO COLOR FLOW MAPG: CPT | Mod: 26 | Performed by: INTERNAL MEDICINE

## 2023-11-14 PROCEDURE — 250N000009 HC RX 250: Performed by: INTERNAL MEDICINE

## 2023-11-14 PROCEDURE — 250N000011 HC RX IP 250 OP 636: Performed by: INTERNAL MEDICINE

## 2023-11-14 PROCEDURE — 93320 DOPPLER ECHO COMPLETE: CPT | Mod: 26 | Performed by: INTERNAL MEDICINE

## 2023-11-14 PROCEDURE — 99232 SBSQ HOSP IP/OBS MODERATE 35: CPT | Mod: GC | Performed by: STUDENT IN AN ORGANIZED HEALTH CARE EDUCATION/TRAINING PROGRAM

## 2023-11-14 PROCEDURE — C9113 INJ PANTOPRAZOLE SODIUM, VIA: HCPCS | Mod: JZ

## 2023-11-14 PROCEDURE — 99232 SBSQ HOSP IP/OBS MODERATE 35: CPT | Mod: 24 | Performed by: STUDENT IN AN ORGANIZED HEALTH CARE EDUCATION/TRAINING PROGRAM

## 2023-11-14 PROCEDURE — 200N000002 HC R&B ICU UMMC

## 2023-11-14 PROCEDURE — 250N000013 HC RX MED GY IP 250 OP 250 PS 637: Performed by: INTERNAL MEDICINE

## 2023-11-14 RX ORDER — DEXTROSE MONOHYDRATE 100 MG/ML
INJECTION, SOLUTION INTRAVENOUS CONTINUOUS PRN
Status: DISCONTINUED | OUTPATIENT
Start: 2023-11-14 | End: 2023-11-15 | Stop reason: HOSPADM

## 2023-11-14 RX ORDER — FENTANYL CITRATE 50 UG/ML
25 INJECTION, SOLUTION INTRAMUSCULAR; INTRAVENOUS
Status: DISCONTINUED | OUTPATIENT
Start: 2023-11-14 | End: 2023-11-15

## 2023-11-14 RX ORDER — DEXTROSE MONOHYDRATE, SODIUM CHLORIDE, SODIUM LACTATE, POTASSIUM CHLORIDE, CALCIUM CHLORIDE 5; 600; 310; 179; 20 G/100ML; MG/100ML; MG/100ML; MG/100ML; MG/100ML
INJECTION, SOLUTION INTRAVENOUS CONTINUOUS
Status: DISPENSED | OUTPATIENT
Start: 2023-11-14 | End: 2023-11-14

## 2023-11-14 RX ORDER — NALOXONE HYDROCHLORIDE 0.4 MG/ML
0.2 INJECTION, SOLUTION INTRAMUSCULAR; INTRAVENOUS; SUBCUTANEOUS
Status: DISCONTINUED | OUTPATIENT
Start: 2023-11-14 | End: 2023-11-15

## 2023-11-14 RX ORDER — FENTANYL CITRATE 50 UG/ML
50 INJECTION, SOLUTION INTRAMUSCULAR; INTRAVENOUS ONCE
Status: DISCONTINUED | OUTPATIENT
Start: 2023-11-14 | End: 2023-11-15

## 2023-11-14 RX ORDER — NALOXONE HYDROCHLORIDE 0.4 MG/ML
0.4 INJECTION, SOLUTION INTRAMUSCULAR; INTRAVENOUS; SUBCUTANEOUS
Status: DISCONTINUED | OUTPATIENT
Start: 2023-11-14 | End: 2023-11-15

## 2023-11-14 RX ORDER — SODIUM CHLORIDE 9 MG/ML
INJECTION, SOLUTION INTRAVENOUS CONTINUOUS PRN
Status: DISCONTINUED | OUTPATIENT
Start: 2023-11-14 | End: 2023-11-14

## 2023-11-14 RX ORDER — ACYCLOVIR 200 MG/1
9.5 CAPSULE ORAL
Status: DISCONTINUED | OUTPATIENT
Start: 2023-11-14 | End: 2023-11-15

## 2023-11-14 RX ORDER — LIDOCAINE 40 MG/G
CREAM TOPICAL
Status: DISCONTINUED | OUTPATIENT
Start: 2023-11-14 | End: 2023-11-15

## 2023-11-14 RX ORDER — LIDOCAINE HYDROCHLORIDE 20 MG/ML
15 SOLUTION OROPHARYNGEAL ONCE
Status: COMPLETED | OUTPATIENT
Start: 2023-11-14 | End: 2023-11-14

## 2023-11-14 RX ORDER — CEFTRIAXONE 2 G/1
2 INJECTION, POWDER, FOR SOLUTION INTRAMUSCULAR; INTRAVENOUS EVERY 24 HOURS
Status: COMPLETED | OUTPATIENT
Start: 2023-11-15 | End: 2023-11-15

## 2023-11-14 RX ORDER — FLUMAZENIL 0.1 MG/ML
0.2 INJECTION, SOLUTION INTRAVENOUS
Status: DISCONTINUED | OUTPATIENT
Start: 2023-11-14 | End: 2023-11-15

## 2023-11-14 RX ADMIN — BISACODYL 10 MG: 5 TABLET, COATED ORAL at 16:38

## 2023-11-14 RX ADMIN — FAMOTIDINE 20 MG: 40 POWDER, FOR SUSPENSION ORAL at 20:28

## 2023-11-14 RX ADMIN — ACETAMINOPHEN 650 MG: 325 SOLUTION ORAL at 06:41

## 2023-11-14 RX ADMIN — CEFTRIAXONE SODIUM 2 G: 2 INJECTION, POWDER, FOR SOLUTION INTRAMUSCULAR; INTRAVENOUS at 06:26

## 2023-11-14 RX ADMIN — MIDAZOLAM 1 MG: 1 INJECTION INTRAMUSCULAR; INTRAVENOUS at 09:14

## 2023-11-14 RX ADMIN — INSULIN ASPART 1 UNITS: 100 INJECTION, SOLUTION INTRAVENOUS; SUBCUTANEOUS at 22:31

## 2023-11-14 RX ADMIN — ACETAMINOPHEN 650 MG: 325 SOLUTION ORAL at 02:04

## 2023-11-14 RX ADMIN — MAGNESIUM SULFATE HEPTAHYDRATE: 500 INJECTION, SOLUTION INTRAMUSCULAR; INTRAVENOUS at 21:02

## 2023-11-14 RX ADMIN — ACETAMINOPHEN 650 MG: 325 SOLUTION ORAL at 16:39

## 2023-11-14 RX ADMIN — PROMETHAZINE HYDROCHLORIDE 25 MG: 25 INJECTION INTRAMUSCULAR; INTRAVENOUS at 16:59

## 2023-11-14 RX ADMIN — SENNOSIDES 5 ML: 8.8 LIQUID ORAL at 11:58

## 2023-11-14 RX ADMIN — MIDAZOLAM 1 MG: 1 INJECTION INTRAMUSCULAR; INTRAVENOUS at 09:18

## 2023-11-14 RX ADMIN — DEXTROSE MONOHYDRATE, SODIUM CHLORIDE, SODIUM LACTATE, POTASSIUM CHLORIDE, CALCIUM CHLORIDE: 5; 600; 310; 179; 20 INJECTION, SOLUTION INTRAVENOUS at 03:40

## 2023-11-14 RX ADMIN — LIDOCAINE 1 PATCH: 4 PATCH TOPICAL at 11:55

## 2023-11-14 RX ADMIN — MIDAZOLAM 1 MG: 1 INJECTION INTRAMUSCULAR; INTRAVENOUS at 09:12

## 2023-11-14 RX ADMIN — AMITRIPTYLINE HYDROCHLORIDE 40 MG: 10 TABLET, FILM COATED ORAL at 21:59

## 2023-11-14 RX ADMIN — DEXTROSE MONOHYDRATE, SODIUM CHLORIDE, SODIUM LACTATE, POTASSIUM CHLORIDE, CALCIUM CHLORIDE: 5; 600; 310; 179; 20 INJECTION, SOLUTION INTRAVENOUS at 16:53

## 2023-11-14 RX ADMIN — BACLOFEN 20 MG: 10 TABLET ORAL at 11:42

## 2023-11-14 RX ADMIN — TOPICAL ANESTHETIC 0.5 ML: 200 SPRAY DENTAL; PERIODONTAL at 09:07

## 2023-11-14 RX ADMIN — BUPRENORPHINE HYDROCHLORIDE 150 MCG: 150 FILM, SOLUBLE BUCCAL at 11:48

## 2023-11-14 RX ADMIN — SENNOSIDES 10 ML: 8.8 LIQUID ORAL at 20:28

## 2023-11-14 RX ADMIN — FENTANYL CITRATE 25 MCG: 50 INJECTION, SOLUTION INTRAMUSCULAR; INTRAVENOUS at 09:14

## 2023-11-14 RX ADMIN — FENTANYL CITRATE 25 MCG: 50 INJECTION, SOLUTION INTRAMUSCULAR; INTRAVENOUS at 09:12

## 2023-11-14 RX ADMIN — ACETAMINOPHEN 650 MG: 325 SOLUTION ORAL at 22:01

## 2023-11-14 RX ADMIN — PROMETHAZINE HYDROCHLORIDE 25 MG: 25 INJECTION INTRAMUSCULAR; INTRAVENOUS at 08:26

## 2023-11-14 RX ADMIN — LEVOTHYROXINE SODIUM 137 MCG: 0.11 TABLET ORAL at 06:41

## 2023-11-14 RX ADMIN — PANTOPRAZOLE SODIUM 40 MG: 40 INJECTION, POWDER, FOR SOLUTION INTRAVENOUS at 11:59

## 2023-11-14 RX ADMIN — LORAZEPAM 0.5 MG: 2 INJECTION INTRAMUSCULAR; INTRAVENOUS at 21:48

## 2023-11-14 RX ADMIN — Medication 5 ML: at 16:50

## 2023-11-14 RX ADMIN — BUPRENORPHINE HYDROCHLORIDE 150 MCG: 150 FILM, SOLUBLE BUCCAL at 21:59

## 2023-11-14 RX ADMIN — DIPHENHYDRAMINE HYDROCHLORIDE 25 MG: 25 SOLUTION ORAL at 18:46

## 2023-11-14 RX ADMIN — DRONABINOL 2.5 MG: 5 SOLUTION ORAL at 16:36

## 2023-11-14 RX ADMIN — SODIUM CHLORIDE 100 ML/HR: 9 INJECTION, SOLUTION INTRAVENOUS at 09:46

## 2023-11-14 RX ADMIN — LIDOCAINE HYDROCHLORIDE 30 ML: 20 SOLUTION ORAL at 09:06

## 2023-11-14 RX ADMIN — FENTANYL CITRATE 25 MCG: 50 INJECTION, SOLUTION INTRAMUSCULAR; INTRAVENOUS at 09:18

## 2023-11-14 RX ADMIN — Medication 15 ML: at 11:58

## 2023-11-14 RX ADMIN — BACLOFEN 20 MG: 10 TABLET ORAL at 16:39

## 2023-11-14 ASSESSMENT — ACTIVITIES OF DAILY LIVING (ADL)
ADLS_ACUITY_SCORE: 37
DEPENDENT_IADLS:: INDEPENDENT
ADLS_ACUITY_SCORE: 37
ADLS_ACUITY_SCORE: 37

## 2023-11-14 NOTE — CONSULTS
Health Psychology                                                                                                                          Sweta Michelle, Ph.D., L.P [phone]  Melva Beebe, Ph.D., L.P. [phone]  Rachel Sloan, Ph.D, L..P. [phone]  Kenya Sanchez, Ph.D., L.P. [phone]  Estrada Patel, Ph.D., A.B.P.P., L.P. [phone]         Enedina Joaquin, Ph.D., L.P. [phone]       Leesa Porras, Ph.D., A.B.P.P., LP [phone]           [facility], 3rd Floor  [address] [place] [address]       Inpatient Health Psychology Consultation      Date of Service:  11/14/23    Brief check-in with Dinora cespedes. On first attempt she was walking in the hallway with visitors. On follow-up she was with her  and her son, other son on his way to visit. Made plans to meet tomorrow to catch her 1:1. She shared updates about emotional support outpatient with Sweta Ghosh and how she is benefiting from this.    PLAN: Follow-up tomorrow to offer support.    Rachel Sloan, PhD,   Clinical Health Psychologist  Phone: [phone]  Pager: [phone]

## 2023-11-14 NOTE — PROGRESS NOTES
Brief Progress Note:    SHELLIE reviewed, no evidence of vegetations.     Cardiology will sign off at this time. Please reach out if you have further questions.       Cayetano Willson   Cardiology Fellow

## 2023-11-14 NOTE — PLAN OF CARE
"Goal Outcome Evaluation:  Shift 0700 - 1930  Plan of Care Reviewed With: patient  Overall Patient Progress: no change    Reason for admission: Bacteremia  Vitals: Afebrile, VSS, on RA  /73 (BP Location: Right arm)   Pulse 73   Temp 97.9  F (36.6  C) (Oral)   Resp 18   Ht 1.727 m (5' 8\")   Wt 57.4 kg (126 lb 9.6 oz)   SpO2 100%   BMI 19.25 kg/m    Activity: Independent in room and hallway.  Pain: pain to lower abdomen and tube site.   Neuro: A&O x 4  Cardiac: HR RRR, no murmur noted.  Respiratory: LS Clear, no cough or sob noted.  GI/: Abdomen flat /c separate G Tube (Crushed Meds), J Tube (  Diet: Clear Liquids  Lines: PICC DL with good blood return to both lumens - Infusing D5LR /c 20 KCL @ 75/hr.  Prn Phenergan x 2 this shift.   Wounds/Incisions: Right Chest (Port removal site), erythremic with scant amount of serous tan drainage, dressing changed today with 1x1 Aquacel and 4x4 Mepilex dressing on top.    Labs/imaging/Consults: WOC, ID, Care Mgmt/SW, and Nutrition consults.   Discharge Plan: Continue POC with restarting Tube Feeding @ 20 mL/hr and TPN.  Possible discharge home tomorrow vs Thursday.     Yesica Hayes RN BSN PHN      "

## 2023-11-14 NOTE — PROGRESS NOTES
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 Now being treated for CLABSI Klebsiella pneumoniae bacteremia.     Changes today:   - SHELLIE without evidence of endocarditis   - Appreciate ID recs - continue ceftriaxone until 11/15 and then transition to levofloxacin 750 mg daily PO for additional 7 days.  - Nutrition to start TPN     # Bacteremia   # H/o CLABSI Klebsiella pneumoniae bacteremia x2   Blood cultures growing gram negative bacilli. Was previously admitted twice for Klebsiella bacteremia, first 6/23/23 requiring 14-day course of ertapenem. Was then admitted to [place] for TPN-associated CLABSI Klebsiella pneumoniae infection leading to septic shock. Blood cultures positive on 10/9, first negative 10/11. Received ceftriaxone w/ antibiotic lock therapy to save port. Patient followed with infectious disease who stated that port site may be source of re-seeding bloodstream. Also has history of direct jejunostomy site infection. Patient afebrile and without leukocytosis on admission. Source of infection most likely open port site. Port line removed 11/9. Echo on 11/8 with echodensity on the cusp of the aortic valve that could be consistent with vegetation, but SHELLIE shows no evidence of endocarditis.   - Infectious disease consult, appreciate recs:    - Repeat blood cultures 11/11/23: NGTD   - Continue ceftriaxone until 11/15 and then  transition to levofloxacin 750 mg daily PO for additional 7 days (end date of 11/22/23)      # Intractable nausea/vomiting   # Abdominal Pain   # Gastroparesis    Follows with Dr. Mandeep Park with GI, and [facility]. 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 CT A/P without acute process. J tube malfunction on night of 11/10. Replaced 11/11. Started on TPN on 11/14.    - Diet:    - Replaced PEJ with panc-hollie team on 11/11    - J tube feeds ,up-titrating as tolerated   - Clear liquid diet    - G-tube to gravity  - Nutrition consulted:   - Started TPN on 11/14   - PTA thiamine   - Bowel reg:    - Fleet enemas PRN (patient prefers over suppositories)    - Bisacodyl tabs in G-tube (per patient's PTA regimen)    - Sennosides BID scheduled  - Discontinued methyl natrexone (two doses, 11/10 and 11/12 for possible opioid induced constipation), as constipation likely represents patient's baseline low gastric motility  - For nausea:    - Continue PTA IV phenergan   - Continue scopolamine patch    - Continue compazine    - Creon with meals   - Continue Lorazepam q6h injection PRN    - PTA PPI: Protonix AM and Famotidine per J tube   - Pain:    - Dilaudid 0.3mg q3 PRN    - Acetaminophen q6h scheduled   - Start dronabinol due to PTA medical marijuana   - PTA buprenorphine 150 mcg q12h   - PTA baclofen  - Health psychology consult for distress related to medical condition      # Insulin-dependent diabetes mellitus   Patient reports using 7u Lantus daily while on tube feeds.   -Continue 3u lantus/day   -LSSI     # Hypothyroidism  -Continue PTA levothyroxine         Diet: Adult Formula Drip Feeding: Continuous Vital 1.5; Jejunostomy; Goal Rate: 10 mL/hr (TRICKLE ONLY) 11/10 per MD. On 11/11,  begin rate advancement by 10 mL Q8hrs as tolerated, to goal rate of 45 mL/hr. Attach Relizorb with TFs, per pt care order (se...  NPO per Anesthesia Guidelines for Procedure/Surgery Except for: Meds    DVT Prophylaxis: Pneumatic Compression Devices  Piedra Catheter: Not present  Lines: PIV  Cardiac Monitoring: None  Code Status: No CPR- Pre-arrest intubation OK      Clinically Significant Risk Factors                                    Disposition Plan       Disposition currently contingent on restarting TPN and SHELLIE.     Ammy Bolden MD  Medicine Service, [facility] TEAM [address] [place] [address]  Securely message with [email or web address] (more info)  Text page via [facility] Paging/Directory   See signed in provider for up to date coverage information      Discussed with the attending, Dr. Almanza.     ______________________________________________________________________    Interval History   Nursing notes reviewed.  KOREY.  Patient feels sleepy from SHELLIE this AM.   In agreement with starting TPN.   Patient feels like her bowels are increasing in activity.   Stressed/sad about her mom having recent stroke.     Physical Exam   Vital Signs: Temp: 97.7  F (36.5  C) Temp src: Oral BP: 117/73 Pulse: 73   Resp: 16 SpO2: 99 % O2 Device: None (Room air)    Weight: 125 lbs 0 oz    General: Laying in bed, sleepy  HEENT: Normocephalic, atraumatic, dry mucus membranes   Cardiovascular: RRR, no M/R/G   Respiratory: Normal WOB   Abdominal: Very mild tenderness in suprapubic area. No rebound or guarding. Soft. Bowel sounds present.    Neuro: alert and oriented, no focal neuro deficits   Psych: appropriate       Data   Recent Results (from the past 24 hour(s))   Glucose by meter    Collection Time: 11/13/23 10:15 AM   Result Value Ref Range    GLUCOSE BY METER POCT 148 (H) 70 - 99 mg/dL   Glucose by meter    Collection Time: 11/13/23  3:51 PM   Result Value Ref Range    GLUCOSE BY METER POCT 79 70 - 99 mg/dL   Glucose  by meter    Collection Time: 11/13/23  6:22 PM   Result Value Ref Range    GLUCOSE BY METER POCT 81 70 - 99 mg/dL   Glucose by meter    Collection Time: 11/13/23 10:34 PM   Result Value Ref Range    GLUCOSE BY METER POCT 82 70 - 99 mg/dL   Glucose by meter    Collection Time: 11/14/23  2:00 AM   Result Value Ref Range    GLUCOSE BY METER POCT 89 70 - 99 mg/dL   Glucose by meter    Collection Time: 11/14/23  6:25 AM   Result Value Ref Range    GLUCOSE BY METER POCT 110 (H) 70 - 99 mg/dL

## 2023-11-14 NOTE — SEDATION DOCUMENTATION
Pt arrived in ECHO department from OBS for scheduled SHELLIE.   Procedure explained, questions answered and consent signed. Discharge instructions discussed with patient.  Pt's throat sprayed at 0900, therefore pt will not be able to eat or drink until 2 hours after at 1100. Informed pt of this time and encouraged to start with warm fluids and soft foods.    Pt tolerated procedure well, and was given a total of 75 mcg IV fentanyl and 3 mg IV versed for conscious sedation. Pt denied throat or chest pain after SHELLIE complete.   SHELLIE probe 66 used for procedure.  Pt denied throat pain after procedure and was transported back to OBS unit after awake and VSS.   Report given to Michelle ARMSTRONG RN.

## 2023-11-14 NOTE — PROGRESS NOTES
CLINICAL NUTRITION SERVICES - BRIEF NOTE     Nutrition Prescription    Recommendations already ordered by Registered Dietitian (RD):  Modify TF order as below:   TFs via J-port: Vital 1.5 kevin (or equivalent) at rate of 20 mL/hr to provide: 480 mL/day, 720 Kcals, 49 gm protein, 90 gm CHO, 3 gm fiber, and 367 mL free water from TF  --> Meets 48% kcal goal, 68% protein goal    RELIZORB CARTRIDGES with TF  *Change 1 cartridge every 24 hours with TF rate at 10-20 ml/hr  *Change 1 cartridge every 12 hours with TF rate >20 ml/hr     Begin supplemental PN via PICC as below to meet remainder of nutrition needs  Dosing weight:  60 kg (UBW)   Access: PICC    Initial parameters (per day)  Volume:  1000 mL or per PharmD   Dextrose: 150 g  AA: 65 g  Lipids: None     Dextrose titration:   None     Additives: None (receiving liquid MVI + TF)    - Goal PN provides 1L fluid, 150 g dextrose, 65 g AA, and no lipids for total provision of 770 Kcals (12.5 Kcals/kg), 1.1 g/kg protein, GIR 1.7 mg/kg/minute, and 0% fat kcals on average daily.     - TF + TPN together provide: 1490 Kcals (25 Kcal/kg), 114 gm protein (1.9 gm/kg)    Future/Additional Recommendations:  Continue to monitor nutrition related findings per protocol    For last full RD assessment, see note dated 11/10/23    NEW FINDINGS   - Consult received to begin supplemental TPN in conjunction with TFs. PICC placed yesterday.     - Contacted RD on I team to ask when pt last received TPN. Per I RD, pt received TPN  from 5/10/23 to 6/10/23 and was then transitioned to enteral feeds for full nutrition. Patient continued to receive 1 L of LR daily with TFs for full hydration.     - Writer able to connect with pt in room today. Pt accompanied by her daughter. Reports home TF regimen was cyclic TFs at 80 mL/hr x 12 hours with either Vital 1.5 or Peptamen 1.5 (varied pending stock) + supposed to take 4 additional Protein modulars per day but reports did not tolerate these, so had  not been taking since ~August.     - Pt started on trickle, continuous TFs this admit. Had not progressed beyond 30 mL/hr yet and TFs off this AM for procedure (have not been restarted yet). Discussed plan for supplemental TPN with patient. She has been on TPN in the past and reports feeling the best when she is on TPN; however we did review risks associated with TPN and pt understands that she will still be receiving TFs.     - Discussed pt with primary team. Decision to meet ~50% est needs with TFs and ~50% est needs with TPN. Pt is followed by RD at [facility] as noted above, and adjustments can be made as needed after discharge.      - Reviewed labs, meds, GI status/stooling trends.     INTERVENTIONS  Implementation  Collaboration with other providers - Primary team MD, PharmD, [facility] RD   Enteral Nutrition - Modify order   Parenteral Nutrition/IV Fluids - Initiate supplemental PN via PICC     Monitoring/Evaluation  Will continue to monitor and evaluate per protocol.    Robel Marshall RDN, LD, CNSC  6B work-room RD phone: *[phone]   6B/Obs RD pager: [phone]    Weekend/Holiday RD pager [phone]

## 2023-11-14 NOTE — CARE PLAN
VSS. A&O. ABD pain rated 4 to 5/10. States it's her normal pain. Treated with scheduled tylenol. Gtube with crushed meds and Jtube with liquid meds. JTube to drainage when no meds are being given. Feeds are held currently. NPO since midnight for SHELLIE this AM. PICC line with purple infusing D5/LR/20K at 75 and red SL. Right chest bandaged per WOC, dressing c/d/I. Up ad koffi. Call light in reach. Makes needs known.

## 2023-11-14 NOTE — PROGRESS NOTES
Patient returned to unit from SHELLIE, ambulated with assist back to bed.  Report received from MONICA Quinteros - pt to be NPO until 1100.

## 2023-11-14 NOTE — PRE-PROCEDURE
GENERAL PRE-PROCEDURE:   Procedure:  Transesophageal echocardiogram  Date/Time:  11/14/2023 8:45 AM    Verbal consent obtained?: Yes    Written consent obtained?: Yes    Risks and benefits: Risks, benefits and alternatives were discussed    Consent given by:  Patient  Patient states understanding of procedure being performed: Yes    Patient's understanding of procedure matches consent: Yes    Procedure consent matches procedure scheduled: Yes    Expected level of sedation:  Moderate  Appropriately NPO:  Yes  ASA Class:  4  Mallampati  :  Grade 3- soft palate visible, posterior pharyngeal wall not visible  Lungs:  Lungs clear with good breath sounds bilaterally  Heart:  Normal heart sounds and rate  History & Physical reviewed:  History and physical reviewed and no updates needed  Statement of review:  I have reviewed the lab findings, diagnostic data, medications, and the plan for sedation

## 2023-11-14 NOTE — CONSULTS
Care Management Initial Consult    General Information  Assessment completed with: Patient,    Type of CM/SW Visit: Initial Assessment    Primary Care Provider verified and updated as needed: Yes   Readmission within the last 30 days: no previous admission in last 30 days      Reason for Consult: discharge planning  Advance Care Planning: Advance Care Planning Reviewed: no concerns identified          Communication Assessment  Patient's communication style: spoken language (English or Bilingual)             Cognitive  Cognitive/Neuro/Behavioral: WDL                      Living Environment:   People in home: spouse     Current living Arrangements: house      Able to return to prior arrangements: yes       Family/Social Support:  Care provided by: self, spouse/significant other  Provides care for: no one  Marital Status:              Description of Support System:           Current Resources:   Patient receiving home care services: Yes  Skilled Home Care Services: Skilled Nursing  Community Resources: Home Infusion, Home Care  Equipment currently used at home: other (see comments) (TF supplies)  Supplies currently used at home:  (G and J tube supplies, enteral feeds)    Employment/Financial:  Employment Status: disabled        Financial Concerns:             Does the patient's insurance plan have a 3 day qualifying hospital stay waiver?  No    Lifestyle & Psychosocial Needs:  Social Determinants of Health     Food Insecurity: Not on file   Depression: Not at risk (11/8/2023)    PHQ-2     PHQ-2 Score: 2   Housing Stability: Not on file   Tobacco Use: Medium Risk (11/13/2023)    Patient History     Smoking Tobacco Use: Former     Smokeless Tobacco Use: Unknown     Passive Exposure: Not on file   Financial Resource Strain: Not on file   Alcohol Use: Not on file   Transportation Needs: Not on file   Physical Activity: Not on file   Interpersonal Safety: Not on file   Stress: Not on file   Social Connections: Not on  file       Functional Status:  Prior to admission patient needed assistance:   Dependent ADLs:: Independent  Dependent IADLs:: Independent       Mental Health Status:          Chemical Dependency Status:                Values/Beliefs:  Spiritual, Cultural Beliefs, Muslim Practices, Values that affect care: no               Additional Information:  RNCC met with patient in private area of hospital lobby, patient surrounded by patient family for initial case management assessment, completed for discharge planning needs, HI. Patient comes from home, a house with her . Patient is on HI enteral feeds with [facility], now being referred for TPN (patient previously on), and for IV abx, PLC requested and patient on wait list, [facility] states patient has coverage for TPN and IV antibiotics. Patient uses [facility] for HC RN (patient lives in [place], [place]). Patient is requesting education on TPN and IV abx prior to discharge. RNCC continues to follow.    [facility]  Phone # [phone]  Fax # [phone]   TPN/TF/HI IV abx referrals made, orders to be placed      [facility]  [address] [place] [address]  Ph: [phone]  Fax: [phone]  HC RN, resumption orders placed      MEDARDO HodgesN, BA, RN, CMSRN, RNCC  [facility]  Covering Units 6C Beds 9742-8999/OBS  Phone: [phone]  Pager: [phone]  After Hours [phone]    6C Beds 8751-7001  Ph: [phone]  6C Beds 8300-6304  pager: [phone]    6B/C/D RNCC Weekend/holiday pager: [phone]    6A/ICU RNCC Weekend/holiday pager: [phone]    7A/7B/7C/7D RNCC Weekend/holiday pager: [phone]    5A/B/C RNCC Weekend/holiday pager: [phone]    [facility] RNCC ED/5 Ortho/5 Med/Surg [phone]    [facility] RNCC 6 Med/Surg 8A, [phone]    Observation SW and weekend/after hours phone: [phone]

## 2023-11-14 NOTE — PROGRESS NOTES
[address] [place] [address]  Total [facility] Discharge Summary      Patient ID:  Diana Mcdermott  [id number]  63 y.o.  1962    Admit date: 2/15/2022  Discharge date and time: 2-16-22  Admitting Physician: Nimisha Barron MD  Surgeon: Same  Admission Diagnoses: Primary osteoarthritis of left knee [M17.12]  Degenerative arthritis of left knee [M17.12]  Discharge Diagnoses: Principal Problem:    S/P TKR (total knee replacement), left (2/16/2022)    Active Problems:    Degenerative arthritis of left knee (2/15/2022)                                Perioperative Antibiotics: Ancef 1 to 3 g was given depending on patient's weight. If allergic to Ancef or due to other indications, patient was given Vancomycin/Gent per protocol      Hospital Medications given:   [unfilled]  [unfilled]  [unfilled]    Discharge Medications given:  Current Discharge Medication List      START taking these medications    Details   methocarbamoL (ROBAXIN) 750 mg tablet Take 1 Tablet by mouth four (4) times daily as needed for Muscle Spasm(s). Qty: 40 Tablet, Refills: 0      oxyCODONE IR (ROXICODONE) 5 mg immediate release tablet Take 1-2 Tablets by mouth every four (4) hours as needed for Pain for up to 7 days. Max Daily Amount: 60 mg.  Qty: 60 Tablet, Refills: 0    Associated Diagnoses: S/P TKR (total knee replacement), left         CONTINUE these medications which have CHANGED    Details   aspirin delayed-release 81 mg tablet Take 1 Tablet by mouth every twelve (12) hours every twelve (12) hours for 30 days. Qty: 60 Tablet, Refills: 0         CONTINUE these medications which have NOT CHANGED    Details   acetaminophen (Tylenol Extra Strength) 500 mg tablet Take 1,000 mg by mouth every six (6) hours as needed for Pain.      metoprolol succinate (TOPROL-XL) 100 mg tablet Take 100 mg by mouth daily. spironolactone (ALDACTONE) 25 mg tablet Take 25 mg by mouth daily. losartan (COZAAR) 50 mg tablet Take 100 mg by mouth daily. Provider was update on the pt.  Pt blood glucose is 89, and she feels, it will drop more.  Pt is NPO, should we infuse D5 into her fluid.   rosuvastatin (CRESTOR) 20 mg tablet Take 20 mg by mouth nightly. co-enzyme Q-10 (Co Q-10) 100 mg capsule Take 100 mg by mouth two (2) times a day. cholecalciferol (Vitamin D3) (1000 Units /25 mcg) tablet Take 1,000 Units by mouth nightly. cyanocobalamin 1,000 mcg tablet Take 1,000 mcg by mouth daily. clopidogreL (Plavix) 75 mg tab Take 75 mg by mouth nightly. Additional DVT Prophylaxis:  GINI Hose,Plexi-Pulse    Postoperative transfusions:   none  Post Op complications: none    Hemoglobin at discharge:   Lab Results   Component Value Date/Time    HGB 13.0 (L) 02/15/2022 07:28 PM       Wound appears to be healing without any evidence of infection. Physical Therapy started on the day following surgery and progressed to independent ambulation with the aid of a walker. At the time of discharge, able to go up and down stairs and had understanding of precautions needed following surgery.       PT/OT:            Assistive Device: Walker (comment)                Discharged to: home    Discharge instructions:  -Rx pain medication given  - Anticoagulate with: resume Plavix/aspirin regimen  -Resume pre hospital diet             -Resume home medications per medical continuation form     -Ambulate with walker, appropriate total joint protocol  -Follow up in office as scheduled       Signed:  LESTER Zaidi  2/16/2022  8:35 AM

## 2023-11-14 NOTE — PROGRESS NOTES
[facility] ID Service: Follow Up Note      Patient:  Dinora Mcghee, Date of birth 1965, Medical record number [id number]  Date of Visit:  November 10, 2023         Assessment and Recommendations:     Problem List:  Klebsiella pneumonia bacteremia  Blood cultures in 4/4 bottles on 11/8/2023 Pan-S Klebsiella pneumonia   Blood culture from right Port-A-Cath on 11/9/2023 Klebsiella pneumonia  Right upper chest Port-A-Cath removed 11/9/2023  Appears to be clinically responding to ceftriaxone (currently on day 6)  Concerns for native aortic valve endocarditis  TTE 11/10/23 with small echodense mass to the left coronary cusp of aortic valve  SHELLIE 11/14 negative for endocarditis  History of prior Pan-Sensitive Klebsiella pneumonia bacteremias  Admission @ [facility] 6/4/23-6/10/2023: Treated with ertapenem 1 g daily IV x14 days (6/7/2023 - 6/21/2023)  Admission @ [facility] 10/9/2023 - 10/13/2023: Patient did not want Port-A-Cath removed so salvage therapy with ceftriaxone 2 g daily IV with line lock x14 days (10/10/2023 - 10/24/2023)  Hx of choledochoduodenostomy, duodenojejunostomy, Vera-en-Y bypass reconstruction s/p J-tube for TF and G-tube for venting     Recommendations:  Ceftriaxone 2 g daily IV for 7 days (11/9/23 - 11/15/23) followed by transition to Levofloxacin 750mg daily PO for 7 days (11/16/23 - 11/22/23)  Can stop daily blood cultures given four days of NGTD and negative SHELLIE   ID will sign off, please call back if concern for new/recurrent infection     Discussion:  Ms Mcghee is a 58-year-old female with an extensive abdominal surgery history including chronic pancreatitis due to gallstones s/p TPIAT 12/2016, gastroparesis, choledochoduodenostomy, duodenojejunostomy, splenectomy, several bowel obstructions, s/p right upper chest Port-A-Cath for IVF/IV meds, J-tube for tube feeds nightly and G-tube for venting and 2 prior episodes of Klebsiella pneumonia bacteremia (6/7/23 treated with  ertapenem 1 g daily IV x14 days and 10/9/2023 treated with ceftriaxone 2 g daily IV + line lock x14 days). She was initially admitted to [facility] on 11/8/2023 for abdominal discomfort, fevers and rigors where she was found to have recurrent Klebsiella pneumonia bacteremia secondary to infected port s/p right upper chest Port-A-Cath removal on 11/9/23.  Currently on ceftriaxone 2 g daily IV (day 6) with repeat blood cultures NGTD. SHELLIE today (11/14) demonstrating no evidence of endocarditis. However, given this is repeat episode of klebsiella bacteremia, will plan on 14-day course of abx total with ceftriaxone 2g daily IV for the first 7 days (11/9/23 - 11/15/23) followed by levofloxacin 750mg daily PO for the last 7 days (11/16/23 - 11/22/23)    ID will sign off. Please call back if new concern for infection.      Discussed with ID Attending, Dr Blevins, and primary team    Levon Peoples, MS4  [facility]         Interval History:     No acute overnight events. Today the patient reports doing well. No new fevers, chills, or new pains. No concerns.          Review of Systems:   Full 9 pt ROS obtained, pertinent positives and negatives as above.          Current Antimicrobials   Current:   Ceftriaxone 2g daily (11/9/23 -          Physical Exam:   Ranges for vital signs:  Temp:  [97.7  F (36.5  C)-98.4  F (36.9  C)] 97.7  F (36.5  C)  Pulse:  [71-86] 71  Resp:  [8-20] 11  BP: (115-141)/(68-93) 121/68  SpO2:  [96 %-100 %] 100 %    Intake/Output Summary (Last 24 hours) at 11/10/2023 0936  Last data filed at 11/10/2023 0750  Gross per 24 hour   Intake 180 ml   Output 140 ml   Net 40 ml     Exam:  GENERAL: Chronically ill-appearing, thin   ENT:  Head is normocephalic, atraumatic. Oropharynx is moist without exudates or ulcers.  EYES:  Eyes have anicteric sclerae.    NECK:  Supple.  LUNGS: Slightly diminished breath sounds but clear to auscultation bilaterally  CARDIOVASCULAR:  Regular rate and rhythm  with no murmurs, gallops or rubs.  ABDOMEN: Soft.  Tenderness to palpation around G-tube and J-tube.  No guarding, rigidity or rebound tenderness concerning for acute abdomen  EXT: Extremities warm and without edema.  SKIN:  No acute rashes.  Right upper chest Port-A-Cath removed with dressing in place that is clean, dry, intact without any active drainage.  No erythema or warmth noted around G-tube sites  NEUROLOGIC:  Grossly nonfocal.         Laboratory Data:   Reviewed.  Pertinent for: Cr 0.5    Culture data:  Bcx (11/8/23) Klebsiella PNA (Pan-S)  Bcx (11/9/23) NGTD  Blood culture (11/11/2023): NGTD         Imaging:   CT abdomen pelvis W 11/8/2023: No abscess, colitis or free air

## 2023-11-15 VITALS
RESPIRATION RATE: 16 BRPM | WEIGHT: 126.6 LBS | HEART RATE: 86 BPM | OXYGEN SATURATION: 99 % | DIASTOLIC BLOOD PRESSURE: 63 MMHG | SYSTOLIC BLOOD PRESSURE: 111 MMHG | BODY MASS INDEX: 19.19 KG/M2 | TEMPERATURE: 97.9 F | HEIGHT: 68 IN

## 2023-11-15 LAB
ALBUMIN SERPL BCG-MCNC: 3.9 G/DL (ref 3.5–5.2)
ALP SERPL-CCNC: 90 U/L (ref 40–150)
ALT SERPL W P-5'-P-CCNC: 32 U/L (ref 0–50)
ANION GAP SERPL CALCULATED.3IONS-SCNC: 8 MMOL/L (ref 7–15)
AST SERPL W P-5'-P-CCNC: 27 U/L (ref 0–45)
BILIRUB DIRECT SERPL-MCNC: <0.2 MG/DL (ref 0–0.3)
BILIRUB SERPL-MCNC: 0.3 MG/DL
BUN SERPL-MCNC: 9.1 MG/DL (ref 6–20)
CALCIUM SERPL-MCNC: 9.2 MG/DL (ref 8.6–10)
CHLORIDE SERPL-SCNC: 101 MMOL/L (ref 98–107)
CREAT SERPL-MCNC: 0.56 MG/DL (ref 0.51–0.95)
DEPRECATED HCO3 PLAS-SCNC: 30 MMOL/L (ref 22–29)
EGFRCR SERPLBLD CKD-EPI 2021: >90 ML/MIN/1.73M2
GLUCOSE BLDC GLUCOMTR-MCNC: 103 MG/DL (ref 70–99)
GLUCOSE BLDC GLUCOMTR-MCNC: 124 MG/DL (ref 70–99)
GLUCOSE BLDC GLUCOMTR-MCNC: 128 MG/DL (ref 70–99)
GLUCOSE SERPL-MCNC: 125 MG/DL (ref 70–99)
HOLD SPECIMEN: NORMAL
INR PPP: 1.15 (ref 0.85–1.15)
MAGNESIUM SERPL-MCNC: 1.9 MG/DL (ref 1.7–2.3)
PHOSPHATE SERPL-MCNC: 4.5 MG/DL (ref 2.5–4.5)
POTASSIUM SERPL-SCNC: 4.6 MMOL/L (ref 3.4–5.3)
PREALB SERPL-MCNC: 14.8 MG/DL (ref 20–40)
PROT SERPL-MCNC: 6.5 G/DL (ref 6.4–8.3)
SODIUM SERPL-SCNC: 139 MMOL/L (ref 135–145)

## 2023-11-15 PROCEDURE — 250N000013 HC RX MED GY IP 250 OP 250 PS 637: Performed by: HOSPITALIST

## 2023-11-15 PROCEDURE — 80053 COMPREHEN METABOLIC PANEL: CPT | Performed by: INTERNAL MEDICINE

## 2023-11-15 PROCEDURE — 250N000011 HC RX IP 250 OP 636

## 2023-11-15 PROCEDURE — 99239 HOSP IP/OBS DSCHRG MGMT >30: CPT | Mod: GC | Performed by: STUDENT IN AN ORGANIZED HEALTH CARE EDUCATION/TRAINING PROGRAM

## 2023-11-15 PROCEDURE — C9113 INJ PANTOPRAZOLE SODIUM, VIA: HCPCS | Mod: JZ

## 2023-11-15 PROCEDURE — 85610 PROTHROMBIN TIME: CPT | Performed by: INTERNAL MEDICINE

## 2023-11-15 PROCEDURE — 83735 ASSAY OF MAGNESIUM: CPT | Performed by: INTERNAL MEDICINE

## 2023-11-15 PROCEDURE — 250N000011 HC RX IP 250 OP 636: Mod: JZ

## 2023-11-15 PROCEDURE — 250N000012 HC RX MED GY IP 250 OP 636 PS 637

## 2023-11-15 PROCEDURE — 250N000013 HC RX MED GY IP 250 OP 250 PS 637: Performed by: INTERNAL MEDICINE

## 2023-11-15 PROCEDURE — 250N000013 HC RX MED GY IP 250 OP 250 PS 637

## 2023-11-15 PROCEDURE — 82248 BILIRUBIN DIRECT: CPT | Performed by: INTERNAL MEDICINE

## 2023-11-15 PROCEDURE — 84100 ASSAY OF PHOSPHORUS: CPT | Performed by: INTERNAL MEDICINE

## 2023-11-15 PROCEDURE — 84134 ASSAY OF PREALBUMIN: CPT | Performed by: INTERNAL MEDICINE

## 2023-11-15 PROCEDURE — 36415 COLL VENOUS BLD VENIPUNCTURE: CPT | Performed by: INTERNAL MEDICINE

## 2023-11-15 RX ORDER — LEVOFLOXACIN 750 MG/1
750 TABLET, FILM COATED ORAL EVERY EVENING
Qty: 7 TABLET | Refills: 0 | Status: SHIPPED | OUTPATIENT
Start: 2023-11-15 | End: 2023-11-29

## 2023-11-15 RX ORDER — SODIUM CHLORIDE, SODIUM LACTATE, POTASSIUM CHLORIDE, CALCIUM CHLORIDE 600; 310; 30; 20 MG/100ML; MG/100ML; MG/100ML; MG/100ML
1000 INJECTION, SOLUTION INTRAVENOUS DAILY PRN
COMMUNITY
Start: 2023-11-15

## 2023-11-15 RX ORDER — LEVOFLOXACIN 750 MG/1
750 TABLET, FILM COATED ORAL EVERY EVENING
Qty: 7 TABLET | Refills: 0 | Status: DISCONTINUED | OUTPATIENT
Start: 2023-11-15 | End: 2023-11-15 | Stop reason: HOSPADM

## 2023-11-15 RX ORDER — BACLOFEN 10 MG/1
10-20 TABLET ORAL 3 TIMES DAILY
Qty: 60 TABLET | Refills: 1 | Status: SHIPPED | OUTPATIENT
Start: 2023-11-15

## 2023-11-15 RX ADMIN — CEFTRIAXONE SODIUM 2 G: 2 INJECTION, POWDER, FOR SOLUTION INTRAMUSCULAR; INTRAVENOUS at 06:18

## 2023-11-15 RX ADMIN — LEVOTHYROXINE SODIUM 137 MCG: 0.11 TABLET ORAL at 08:57

## 2023-11-15 RX ADMIN — ACETAMINOPHEN 650 MG: 325 SOLUTION ORAL at 09:12

## 2023-11-15 RX ADMIN — LORAZEPAM 0.5 MG: 2 INJECTION INTRAMUSCULAR; INTRAVENOUS at 03:01

## 2023-11-15 RX ADMIN — BUPRENORPHINE HYDROCHLORIDE 150 MCG: 150 FILM, SOLUBLE BUCCAL at 09:12

## 2023-11-15 RX ADMIN — ACETAMINOPHEN 650 MG: 325 SOLUTION ORAL at 04:05

## 2023-11-15 RX ADMIN — BACLOFEN 20 MG: 10 TABLET ORAL at 08:57

## 2023-11-15 RX ADMIN — LORAZEPAM 0.5 MG: 2 INJECTION INTRAMUSCULAR; INTRAVENOUS at 08:52

## 2023-11-15 RX ADMIN — LIDOCAINE 1 PATCH: 4 PATCH TOPICAL at 08:59

## 2023-11-15 RX ADMIN — INSULIN GLARGINE 3 UNITS: 100 INJECTION, SOLUTION SUBCUTANEOUS at 09:05

## 2023-11-15 RX ADMIN — HYDROMORPHONE HYDROCHLORIDE 0.3 MG: 1 INJECTION, SOLUTION INTRAMUSCULAR; INTRAVENOUS; SUBCUTANEOUS at 03:01

## 2023-11-15 RX ADMIN — HYDROMORPHONE HYDROCHLORIDE 0.3 MG: 1 INJECTION, SOLUTION INTRAMUSCULAR; INTRAVENOUS; SUBCUTANEOUS at 12:11

## 2023-11-15 RX ADMIN — DRONABINOL 2.5 MG: 5 SOLUTION ORAL at 09:31

## 2023-11-15 RX ADMIN — SENNOSIDES 10 ML: 8.8 LIQUID ORAL at 09:07

## 2023-11-15 RX ADMIN — PANTOPRAZOLE SODIUM 40 MG: 40 INJECTION, POWDER, FOR SOLUTION INTRAVENOUS at 08:57

## 2023-11-15 ASSESSMENT — ACTIVITIES OF DAILY LIVING (ADL)
ADLS_ACUITY_SCORE: 37

## 2023-11-15 NOTE — PLAN OF CARE
"Goal Outcome Evaluation:      Plan of Care Reviewed With: patient    Overall Patient Progress: no change    /75 (BP Location: Right arm)   Pulse 71   Temp 97.7  F (36.5  C) (Oral)   Resp 12   Ht 1.727 m (5' 8\")   Wt 57.4 kg (126 lb 9.6 oz)   SpO2 99%   BMI 19.25 kg/m        Shift 5055-8929      Neuro: A/Ox4  Cardiac: denies chest pain  Respiratory: stable on room air, denies SOB  GI/: complains of nausea, voiding spontaneously  Diet/appetite: tolerating clear liquid diet  Activity: up independently in room and hallway  Pain: pt was complaining of 8/10 pain, given IV dilaudid at 0300, now states pain is 6/10  Skin: incision on R chest from port removal  LDA's: PICC double lumen in R arm, purple saline locked, red infusing TPN  "

## 2023-11-15 NOTE — PROGRESS NOTES
"Neuro: A&Ox4.   Cardiac: Denies chest pain. VSS /63 (BP Location: Right arm)   Pulse 86   Temp 97.9  F (36.6  C) (Oral)   Resp 16   Ht 1.727 m (5' 8\")   Wt 57.4 kg (126 lb 9.6 oz)   SpO2 99%   BMI 19.25 kg/m      Respiratory: Sating 98% on RA.  GI/: Adequate urine output. No BM this shift  Diet/appetite: Tolerating clear liquid diet.  Activity:  Up independently  Pain: Reports 5/10 pain, prn Dilaudid given with relieve..   Skin: R chest wall incision from Port, dressing CDI   LDA's:Double lumen PICC R upper arm    Plan: Continue with POC. Notify primary team with changes.  "

## 2023-11-15 NOTE — PROGRESS NOTES
Discharge instruction reviewed.  Patient verbalized understanding. Patient ambulated to the main lobby accompanied by family. Patient discharged

## 2023-11-15 NOTE — CONSULTS
Health Psychology                                                                                                                          Sweta Michelle, Ph.D., L.P [phone]  Melva Beebe, Ph.D., L.P. [phone]  Rachel Sloan, Ph.D, L..P. [phone]  Kenya Sanchez, Ph.D., L.P. [phone]  Estrada Patel, Ph.D., A.B.P.P., L.P. [phone]         Enedina Joaquin, Ph.D., L.P. [phone]       Leesa Porras, Ph.D., A.B.P.P., LP [phone]           [facility], 3rd Floor  [address] [place] [address]       Inpatient Health Psychology Consultation      Date of Service:  11/15/23    Initial attempt today patient out of room. If she discharges before we can meet with her, she plans to meet with her outpatient therapist 11/16.     Rachel Sloan, PhD,   Clinical Health Psychologist  Phone: [phone]  Pager: [phone]

## 2023-11-15 NOTE — DISCHARGE SUMMARY
[facility]  Discharge Summary - Medicine & Pediatrics       Date of Admission:  11/8/2023  Date of Discharge:  11/15/2023  Discharging Provider: Dr. Bolden   Discharge Service: Medicine Service, KIP TEAM 5    Discharge Diagnoses   - CLABSI Parmindera peneranoniae bacteremia   - Nausea/vomiting   - Gastroparesis   - Insulin-dependent diabetes mellitus   - Hypothyroidism     Follow-ups Needed After Discharge   Follow-up Appointments     Follow Up and recommended labs and tests      Follow up with primary care provider within 2 weeks following your   hospitalization.      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 Found to have CLABSI Klebsiella pneumoniae bacteremia. Started on IV antibiotics with improvement and transitioned to orals. TPN started on 11/14 for additional nutrition.     # Bacteremia   # H/o CLABSI Klebsiella pneumoniae bacteremia x2   Blood cultures growing klebsiella pneumoniae. Was previously admitted for this. Patient afebrile and without leukocytosis on admission. Source of infection most likely open port site. Port line removed 11/9. Echo on 11/8 with echodensity on the cusp of the  aortic valve that could be consistent with vegetation, but SHELLIE showing no evidence of endocarditis.   - Prescribed levofloxacin 750 mg daily PO for additional 7 days (end date of 11/22/23)                 # Intractable nausea/vomiting   # Abdominal Pain   # Gastroparesis    Follows with Dr. Mandeep Park with GI, and [facility]. 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 CT A/P without acute process. J tube malfunction on night of 11/10. Replaced 11/11. Started on TPN on 11/14.    - Patient set up with Home Infusion for tube feeds and TPN   - Continue PTA medications   - Started on dronabinol   - Follow up with PCP within 2 weeks post hospitalization                 # Insulin-dependent diabetes mellitus   - Continue PTA lantus      # Hypothyroidism  -Continue PTA levothyroxine     Consultations This Hospital Stay   WOUND OSTOMY CONTINENCE NURSE  IP CONSULT  INFECTIOUS DISEASE GENERAL ADULT IP CONSULT  INTERVENTIONAL RADIOLOGY ADULT/PEDS IP CONSULT  NUTRITION SERVICES ADULT IP CONSULT  PHARMACY IP CONSULT  PSYCHOLOGY ADULT IP CONSULT  NURSING TO CONSULT FOR VASCULAR ACCESS CARE IP CONSULT  CARDIOLOGY GENERAL ADULT IP CONSULT  VASCULAR ACCESS FOR PICC PLACEMENT ADULT IP CONSULT  WOUND OSTOMY CONTINENCE NURSE  IP CONSULT  PHARMACY/NUTRITION TO START AND MANAGE TPN  PHARMACY/NUTRITION TO START AND MANAGE TPN  PHARMACY IP CONSULT  CARE MANAGEMENT / SOCIAL WORK IP CONSULT    Code Status   No CPR- Pre-arrest intubation OK       The patient was discussed with Dr. Almanza.    Ammy Bolden MD  Internal Medicine, PGY2   [address] [address]  Phone: [phone]  Fax:  [phone]  ______________________________________________________________________    Physical Exam   Vital Signs: Temp: 97.9  F (36.6  C) Temp src: Oral BP: 111/63 Pulse: 86   Resp: 16 SpO2: 99 % O2 Device: None (Room air)    Weight: 126 lbs 9.6 oz    General: Laying in bed, sleepy  HEENT: Normocephalic, atraumatic, dry mucus membranes   Cardiovascular: RRR, no M/R/G   Respiratory: Normal WOB   Abdominal: Very mild tenderness in suprapubic area. No rebound or guarding. Soft. Bowel sounds present.    Neuro: alert and oriented, no focal neuro deficits   Psych: appropriate      Primary Care Physician   Juan Jose Gomez    Discharge Orders      Medication Therapy Management Referral      Home Infusion Referral      Resume Home Care Services     Reason for your hospital stay    Infection     Activity    Your activity upon discharge: activity as tolerated     Follow Up and recommended labs and tests    Follow up with primary care provider within 2 weeks following your hospitalization.     Diet    Follow this diet upon discharge: Orders Placed This Encounter      Adult Formula Drip Feeding: Specified Time Vital 1.5; Jejunostomy; Goal Rate: 40 mL/hr - 11/15 - Increase TF rate to 30 mL/hr and increase to goal after 4 hours as tolerated with transition to cyclic TFs. - please make sure to attach Relizorb with...      Clear Liquid Diet       Significant Results and Procedures   Recent Results (from the past 24 hour(s))   Glucose by meter    Collection Time: 11/14/23  6:13 PM   Result Value Ref Range    GLUCOSE BY METER POCT 121 (H) 70 - 99 mg/dL   Glucose by meter    Collection Time: 11/14/23 10:23 PM   Result Value Ref Range    GLUCOSE BY METER POCT 163 (H) 70 - 99 mg/dL   Glucose by meter    Collection Time: 11/15/23  2:01 AM   Result Value Ref Range    GLUCOSE BY METER POCT 128 (H) 70 - 99 mg/dL   Glucose by meter    Collection Time: 11/15/23  6:32 AM   Result Value Ref Range    GLUCOSE BY METER POCT 124 (H) 70 - 99 mg/dL    Comprehensive metabolic panel    Collection Time: 11/15/23  7:00 AM   Result Value Ref Range    Sodium 139 135 - 145 mmol/L    Potassium 4.6 3.4 - 5.3 mmol/L    Carbon Dioxide (CO2) 30 (H) 22 - 29 mmol/L    Anion Gap 8 7 - 15 mmol/L    Urea Nitrogen 9.1 6.0 - 20.0 mg/dL    Creatinine 0.56 0.51 - 0.95 mg/dL    GFR Estimate >90 >60 mL/min/1.73m2    Calcium 9.2 8.6 - 10.0 mg/dL    Chloride 101 98 - 107 mmol/L    Glucose 125 (H) 70 - 99 mg/dL    Alkaline Phosphatase 90 40 - 150 U/L    AST 27 0 - 45 U/L    ALT 32 0 - 50 U/L    Protein Total 6.5 6.4 - 8.3 g/dL    Albumin 3.9 3.5 - 5.2 g/dL    Bilirubin Total 0.3 <=1.2 mg/dL   Magnesium    Collection Time: 11/15/23  7:00 AM   Result Value Ref Range    Magnesium 1.9 1.7 - 2.3 mg/dL   Phosphorus    Collection Time: 11/15/23  7:00 AM   Result Value Ref Range    Phosphorus 4.5 2.5 - 4.5 mg/dL   INR    Collection Time: 11/15/23  7:00 AM   Result Value Ref Range    INR 1.15 0.85 - 1.15   Prealbumin    Collection Time: 11/15/23  7:00 AM   Result Value Ref Range    Prealbumin 14.8 (L) 20.0 - 40.0 mg/dL   Bilirubin direct    Collection Time: 11/15/23  7:00 AM   Result Value Ref Range    Bilirubin Direct <0.20 0.00 - 0.30 mg/dL   Extra Purple Top Tube    Collection Time: 11/15/23  7:00 AM   Result Value Ref Range    Hold Specimen JIC    Glucose by meter    Collection Time: 11/15/23  8:57 AM   Result Value Ref Range    GLUCOSE BY METER POCT 103 (H) 70 - 99 mg/dL        Discharge Medications   Current Discharge Medication List        START taking these medications    Details   droNABinol (SYNDROS) 5 MG/ML oral soln Take 0.5 mLs (2.5 mg) by mouth 2 times daily  Qty: 60 mL, Refills: 0    Associated Diagnoses: Malnutrition, unspecified type (H24); History of food intolerance; Adult failure to thrive      levofloxacin (LEVAQUIN) 750 MG tablet 1 tablet (750 mg) by Per Feeding Tube route every evening  Qty: 7 tablet, Refills: 0    Associated Diagnoses: Bacteremia      promethazine 25 mg  Inject 25 mg into the vein 2 times daily as needed for nausea           CONTINUE these medications which have CHANGED    Details   baclofen (LIORESAL) 10 MG tablet 1-2 tablets (10-20 mg) by Per G Tube route 3 times daily  Qty: 60 tablet, Refills: 1    Associated Diagnoses: Abdominal pain, generalized           CONTINUE these medications which have NOT CHANGED    Details   acetaminophen (TYLENOL) 325 MG/10.15ML solution 20.3 mLs (650 mg) by Per J Tube route every 6 hours as needed for mild pain or fever  Qty: 473 mL, Refills: 4    Associated Diagnoses: Gastroparesis      amitriptyline (ELAVIL) 10 MG tablet Take 4 tablets (40 mg) by mouth At Bedtime  Qty: 120 tablet, Refills: 11    Comments: The patient requests that this prescription be held on file for filling in the near future.  Associated Diagnoses: Nausea and vomiting, unspecified vomiting type      amylase-lipase-protease (CREON) 81747-12566 units CPEP per EC capsule Take 3-4 capsules by mouth 3 times daily (with meals) With oral meals  Qty: 150 capsule, Refills: 11    Associated Diagnoses: Pancreatic insufficiency      Buprenorphine HCl (BELBUCA) 150 MCG FILM buccal film Place 150 mcg inside cheek every 12 hours      cetirizine (ZYRTEC) 10 MG tablet 1 tablet (10 mg) by Per G Tube route daily  Qty: 30 tablet, Refills: 11    Associated Diagnoses: Feeding intolerance      Digestive Enzyme Cartridge (RELIZORB) MARCO 2 Cartridges daily  Qty: 60 each, Refills: 6    Associated Diagnoses: Pancreatic insufficiency      diphenhydrAMINE (BENADRYL) 12.5 MG/5ML solution Take 25 mg by mouth 4 times daily as needed for other (nausea)      estradiol (VAGIFEM) 10 MCG TABS vaginal tablet INSERT 1 TABLET(10 MCG) VAGINALLY 2 TIMES A WEEK  Qty: 24 tablet, Refills: 2    Comments: The patient requests that this prescription be held on file for filling in the near future.  Associated Diagnoses: Atrophic vaginitis      famotidine (PEPCID) 40 MG/5ML suspension 2.5 mLs (20 mg) by Per J  Tube route daily  Qty: 50 mL, Refills: 4    Associated Diagnoses: Gastroparesis      glucagon 1 MG kit Give 0.1 to 0.15mg( 10-15 units on Insulin sryinge) subcutaneous  every 15 minutes PRN for hypoglycemia. Remix new kit q24hr. Needs up to 3 kit/week.  Qty: 10 each, Refills: 3    Associated Diagnoses: Hypoglycemia      glucose 40 % GEL gel Take 15-30 g by mouth every 15 minutes as needed for low blood sugar  Qty: 3 Tube, Refills: 2    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      Insulin Glargine-yfgn (SEMGLEE, YFGN,) 100 UNIT/ML SOPN Inject 3 Units Subcutaneous every 24 hours  Qty: 15 mL, Refills: 3    Associated Diagnoses: Post-pancreatectomy diabetes (H)      lactated ringers infusion Inject 1,000 mLs into the vein daily as needed      levothyroxine (SYNTHROID/LEVOTHROID) 137 MCG tablet 137 mcg by Per G Tube route daily      lidocaine (LIDODERM) 5 % patch Place onto the skin every 24 hours To prevent lidocaine toxicity, patient should be patch free for 12 hrs daily.      medical cannabis (Patient's own supply) Take 1 lozenge 4 MCT to 1 THC by mouth daily for pain and nausea      methocarbamol (ROBAXIN) 750 MG tablet Take 1 tablet (750 mg) by mouth 4 times daily as needed for muscle spasms  Qty: 120 tablet, Refills: 11    Associated Diagnoses: Chronic pain syndrome      pantoprazole (PROTONIX) 40 mg IV push injection Inject 40 mg into the vein daily (with breakfast)  Qty: 30 each, Refills: 11    Comments: Patient gets it from her home infusion  Associated Diagnoses: Gastroparesis      prochlorperazine (COMPAZINE) 10 MG tablet Take 10 mg by mouth every 6 hours as needed for nausea or vomiting      prucalopride succinate (MOTEGRITY) 2 MG tablet Take 1 tablet (2 mg) by mouth daily Per G-tube    Associated Diagnoses: Gastroparesis; Slow transit constipation; Chronic idiopathic constipation; Constipation, unspecified constipation type; Irritable bowel syndrome, unspecified type      scopolamine  "(TRANSDERM) 1 MG/3DAYS 72 hr patch Place 1 patch onto the skin every 72 hours  Qty: 10 patch, Refills: 11    Associated Diagnoses: Slow transit constipation      sennosides (SENOKOT) 8.8 MG/5ML syrup 5 mLs by Per J Tube route 2 times daily  Qty: 236 mL, Refills: 11    Associated Diagnoses: Gastroparesis      silver nitrate (ARZOL SILVER NIT APPLICATORS) 75-25 % miscellaneous Apply topically as needed for wound care Apply Silver Nitrate Sticks every 2-3 days until granulation tissue resolved.  \"Roll\" tip of Silver Nitrate Q tip directly onto the granulation tissue-bulging tissue area.  Have Agency or Home Care Nurse administer this, if you prefer.  Take care not to touch any healthy tissue or skin.  The tissue will turn white and eventually black & scab off.  May repeat if needed in the future for recurrence.  Qty: 30 applicator, Refills: 0    Associated Diagnoses: Encounter for care related to feeding tube      thiamine (B-1) 100 MG tablet 1 tablet (100 mg) by Per J Tube route daily  Qty: 30 tablet, Refills: 11    Associated Diagnoses: Feeding intolerance      zinc oxide - white petrolatum (CRITIC-AID THICK MOIST BARRIER) 20-51% PSTE topical paste Apply 71 g topically every hour as needed for rash (Under J tube bumper when needed for skin protection)  Qty: 170 g, Refills: 0    Associated Diagnoses: Epigastric pain      ZOLMitriptan (ZOMIG-ZMT) 5 MG ODT Take 1 tablet (5 mg) by mouth at onset of headache for migraine Dissolve tablet in the mouth. May repeat in 2 hours. Max 2 tablets/24 hours.  Qty: 12 tablet, Refills: 6    Associated Diagnoses: Migraine with aura and without status migrainosus, not intractable      insulin syringe-needle U-100 (30G X 1/2\" 0.3 ML) 30G X 1/2\" 0.3 ML miscellaneous Use 3 syringes daily or as directed.  Qty: 50 each, Refills: 1    Associated Diagnoses: Hypoglycemia      Nutritional Supplements (BOOST HIGH PROTEIN) LIQD After above baseline labs are drawn, give: 6 mL/kg to maximum of 360 " mL; the beverage is to be consumed within 5 minutes.  Refills: 0    Comments: If on pancreatic enzymes, patient may take home enzymes with Boost beverage.      Patients may take long acting insulin (Levemir and Lantus).      Patient should NOT cover Boost with Novolog, Humalog, Apidra, or regular insulin.  Associated Diagnoses: Pancreatic insufficiency; Post-pancreatectomy diabetes (H); Malabsorption      Sharps Container (BD SHARPS ) MISC 1 Container as needed  Qty: 1 each, Refills: 1    Associated Diagnoses: Post-pancreatectomy diabetes (H)      STATIN NOT PRESCRIBED (INTENTIONAL) Previous liver issues, risks vs benefits felt to not warrant statin.    Discussed Oct 2022 visit           STOP taking these medications       fluticasone (FLONASE) 50 MCG/ACT nasal spray Comments:   Reason for Stopping:         montelukast (SINGULAIR) 10 MG tablet Comments:   Reason for Stopping:         promethazine (PHENERGAN) 25 MG/ML IV injection Comments:   Reason for Stopping:             Allergies   Allergies   Allergen Reactions    Apple Juice Anaphylaxis    Corticosteroids Other (See Comments)     All oral, IV and injectable steroids are contraindicated (unless in life threatening situations) in Islet Auto transplant recipients. They can cause irreversible loss of islet cell function. Please contact patient's transplant care coordinator ADI Gaffney RN at [phone]/pager [phone] and/or endocrinologist prior to administration.      Depakote [Divalproex Sodium] Other (See Comments)     Chest pain    Zithromax [Azithromycin Dihydrate] Anaphylaxis     Noted in 4/7/08 ER    Bromfenac      Other reaction(s): Headache    Codeine      Other reaction(s): Hallucinations    Darvocet [Propoxyphene N-Apap] Itching    Morphine Nausea and Vomiting and Rash    Nalbuphine Hcl Rash     RASH :nubaine    Zosyn [Piperacillin-Tazobactam In Dex] Rash     Possible allergy, did have a diffuse rash that seemed drug related but could  have also been related to soap in the hospital.     Bactrim [Sulfamethoxazole W-Trimethoprim] Other (See Comments) and Nausea and Vomiting     Severely low liver function.    Statins Other (See Comments)     Previous liver issues, risks vs benefits felt to not warrant statin.  Discussed Oct 2022 visit    Tape [Adhesive Tape] Blisters    Tramadol     Zofran [Ondansetron] Other (See Comments)     migraine    Flagyl [Metronidazole] Hives and Rash

## 2023-11-15 NOTE — PROGRESS NOTES
CLINICAL NUTRITION SERVICES - BRIEF NOTE     Nutrition Prescription     Recommendations already ordered by Registered Dietitian (RD):  Transition to cyclic TFs x 12 hours during day [id number]   Cyclic Regimen: Vital 1.5 kevin (or equivalent) at rate of 40 mL/hr to provide: 480 mL/day, 720 Kcals, 49 gm protein, 90 gm CHO, 3 gm fiber, and 367 mL free water from TF   --> Meets 48% kcal goal, 68% protein goal   - Increased TF rate to 30 mL/hr at ~0800. Advance to goal rate after 4 hours as tolerated.     RELIZORB CARTRIDGES with TF  *Change 1 cartridge every 24 hours with TF rate at 10-20 ml/hr  *Change 1 cartridge every 12 hours with TF rate >20 ml/hr      Continue supplemental TPN as ordered. Will be providing vitamin/trace minerals in TPN starting this evening per pt request (tolerates better than liquid MVI)    Supplemental PN providinL fluid, 150 g dextrose, 65 g AA, and no lipids for total provision of 770 Kcals (12.5 Kcals/kg), 1.1 g/kg protein, GIR 1.7 mg/kg/minute, and 0% fat kcals on average daily.   - Additives Infuvite and trace minerals (standard dose)  - Will discontinue liquid MVI/M     Future/Additional Recommendations:  Continue to monitor nutrition related findings per protocol    For last full RD assessment, see note dated 11/10/23 and brief RD note     NEW FINDINGS   - Informed that pt will be starting an antibiotic which warrants holding TFs 1 hr before/after administration. Met with pt at bedside to discuss either resuming a 12-hour TF cycle (which is pt's home feeding pattern) or holding TFs around the antibiotic with 22-hour/day feeds. Pt prefers to trial a 12-hour cycle. New goal rate for 12-hour feedings will be 40 mL/hr.     - TPN to continue. Pt requests for multivitamin/trace elements to be added into TPN rather than oral/liquid dose as she reports better tolerance. She requests education on TPN administration prior to discharge; RNCC aware and pt on wait list with [facility] (vs I  providing education).     - Discussed the above changes w/ primary team MD and in collaboration with PharmD.    INTERVENTIONS  Implementation  - Collaboration with other providers - PharmD, RNCC, Primary team MD   - Enteral Nutrition - Modify to cyclic  - Parenteral Nutrition/IV Fluids - Modify to parenteral MVI/trace elements, discontinue enteral MVI     Monitoring/Evaluation  Will continue to monitor and evaluate per protocol.    Robel Marshall, EMANUELN, LD, CNSC  [address]B work-room [address] phone: *[phone]   [address]B/Obs RD pager: [phone]    Weekend/Holiday RD pager [phone]

## 2023-11-16 ENCOUNTER — MYC MEDICAL ADVICE (OUTPATIENT)
Dept: TRANSPLANT | Facility: CLINIC | Age: 58
End: 2023-11-16
Payer: COMMERCIAL

## 2023-11-16 ENCOUNTER — PATIENT OUTREACH (OUTPATIENT)
Dept: GASTROENTEROLOGY | Facility: CLINIC | Age: 58
End: 2023-11-16
Payer: COMMERCIAL

## 2023-11-16 ENCOUNTER — VIRTUAL VISIT (OUTPATIENT)
Dept: ONCOLOGY | Facility: CLINIC | Age: 58
End: 2023-11-16
Attending: SOCIAL WORKER
Payer: COMMERCIAL

## 2023-11-16 VITALS — WEIGHT: 124 LBS | BODY MASS INDEX: 18.79 KG/M2 | HEIGHT: 68 IN

## 2023-11-16 DIAGNOSIS — F43.23 ADJUSTMENT DISORDER WITH MIXED ANXIETY AND DEPRESSED MOOD: Primary | ICD-10-CM

## 2023-11-16 DIAGNOSIS — Z51.5 PALLIATIVE CARE PATIENT: ICD-10-CM

## 2023-11-16 LAB
BACTERIA BLD CULT: NO GROWTH
BACTERIA BLD CULT: NO GROWTH

## 2023-11-16 PROCEDURE — 90837 PSYTX W PT 60 MINUTES: CPT | Mod: VID | Performed by: SOCIAL WORKER

## 2023-11-16 ASSESSMENT — PAIN SCALES - GENERAL: PAINLEVEL: MODERATE PAIN (4)

## 2023-11-16 NOTE — NURSING NOTE
Patient reviewed medications and allergies in Mychart during e-check in and said everything looked correct.       Patient refused PHQ-2/9      Is the patient currently in the state of [place]? YES    Visit mode:VIDEO    If the visit is dropped, the patient can be reconnected by: VIDEO VISIT: Text to cell phone:   Telephone Information:   Mobile [phone]       Will anyone else be joining the visit? NO  (If patient encounters technical issues they should call [phone]    How would you like to obtain your AVS? MyChart    Are changes needed to the allergy or medication list? Pt stated no med changes    Reason for visit: RECHECK (Recent hospital discharge 11/15/23)      Antoinette FLETCHER

## 2023-11-16 NOTE — TELEPHONE ENCOUNTER
Patient called in with questions about IV infusion orders per Dr. Park    Now discharged on TPN and tube feeds/flushed.    Message sent to Dr. Park to review plan for PRN fluids.    ML

## 2023-11-16 NOTE — LETTER
11/16/2023         RE: Dinora Mcghee  [address] [facility] [address]        Dear Colleague,    Thank you for referring your patient, Dinora Mcghee, to the [facility]. Please see a copy of my visit note below.    Virtual Visit Details    Type of service:  Video Visit     Originating Location (pt. Location): Home    Distant Location (provider location):  On-site  Platform used for Video Visit: [facility]        Palliative Care Clinical Social Work Return Appointment    PLEASE NOTE:  THIS IS A MENTAL HEALTH NOTE.  OTHER PROVIDERS VIEWING THIS NOTE SHOULD USE THIS ONLY FOR UNDERSTANDING THE CONTEXT OF THE PATIENT'S EXPERIENCE.  TOPICS DESCRIBED IN THIS NOTE SHOULD NOT BE REFERENCED TO THE PATIENT BY MEDICAL PROVIDERS.    Dinora Mcghee is a 58  year old woman with multiple medical conditions including chronic pancreatitis s/p TPAIT, long term complications from gastroparesis, constipation, GERD, migraines.  Had GJ placed Sept 2021, recovery was complicated and she ended up with a venting g-tube.  Due to complications with PO tolerance she had surgical placement of a J tube, and a resection of densely adhered small bowel.  Started on tube feedings, then on TPN.  TPN eventually stopped due to infection.  Has heavy symptom burden that includes chronic nausea and vomiting, fatigue, complex medical management. Seen today to address adjustment disorder with depressed mood and anxiety.     Was discharged from hospital day before our visit.     Mental Status Exam:(List all that apply)      Appearance: Appropriate      Eye Contact: Good       Orientation: Yes, x4      Mood: Depressed grieving       Affect: Appropriate -- tearful       Thought Content: Clear         Thought Form: Logical      Psychomotor Behavior: Normal    Visit themes: Anxiety, trauma, stress     Adjustment to Illness:  discharged from the hospital yesterday.  Now home on TPN. Feeling tired.        Mental Health (thoughts,  "feelings, actions, coping, and symptoms):     Her mother had a stroke earlier in the week and is receiving care at [facility] which has added a layer of stress.     Glad to have all of her family around her    Contemplating how she is feeling, and quality of life. \"I still feel the heavy cloak over me, but it's not all the time\"  Describes grief for changes in her health \"I feel like I\"m the kid at the pool on the other side of the fence watching the other kids swim\".  Trying to connect as best she can.  Uses meditation, guided imagery and knitting as coping tools.     Planning to have Thanksgiving at her home next week and is looking forward to being part of a gathering, even if she can't take food in orally.     Helpful activities: time with family.  Meditating, knitting, tapping.  Uses EMDR techniques.        Helpful cognitions:     Unhelpful and/or triggering or exacerbating factors:     Body-Mind Skills Education: uses tapping and EMDR     Relationships:  and children, extended family    End of Life and Advance Care Planning:     Main therapeutic interventions provided this session include:     Provide psychoeducation and Facilitate processing of thoughts and feelings    Plan:  Will return for psychotherapy with palliative care focus in 3 weeks    Time spent with patient/family:  57  (Start 2:30 end 3:27)    JAIDA Nguyen, [facility]   Palliative Care Clinical   Ph: [phone]      DO NOT SEND ANY LETTERS                Again, thank you for allowing me to participate in the care of your patient.        Sincerely,        VASQUEZ Nguyen  "

## 2023-11-16 NOTE — PROGRESS NOTES
Virtual Visit Details    Type of service:  Video Visit     Originating Location (pt. Location): Home    Distant Location (provider location):  On-site  Platform used for Video Visit: [facility]        Palliative Care Clinical Social Work Return Appointment    PLEASE NOTE:  THIS IS A MENTAL HEALTH NOTE.  OTHER PROVIDERS VIEWING THIS NOTE SHOULD USE THIS ONLY FOR UNDERSTANDING THE CONTEXT OF THE PATIENT'S EXPERIENCE.  TOPICS DESCRIBED IN THIS NOTE SHOULD NOT BE REFERENCED TO THE PATIENT BY MEDICAL PROVIDERS.    Dinora Mcghee is a 58  year old woman with multiple medical conditions including chronic pancreatitis s/p TPAIT, long term complications from gastroparesis, constipation, GERD, migraines.  Had GJ placed Sept 2021, recovery was complicated and she ended up with a venting g-tube.  Due to complications with PO tolerance she had surgical placement of a J tube, and a resection of densely adhered small bowel.  Started on tube feedings, then on TPN.  TPN eventually stopped due to infection.  Has heavy symptom burden that includes chronic nausea and vomiting, fatigue, complex medical management. Seen today to address adjustment disorder with depressed mood and anxiety.     Was discharged from hospital day before our visit.     Mental Status Exam:(List all that apply)      Appearance: Appropriate      Eye Contact: Good       Orientation: Yes, x4      Mood: Depressed grieving       Affect: Appropriate -- tearful       Thought Content: Clear         Thought Form: Logical      Psychomotor Behavior: Normal    Visit themes: Anxiety, trauma, stress     Adjustment to Illness:  discharged from the hospital yesterday.  Now home on TPN. Feeling tired.        Mental Health (thoughts, feelings, actions, coping, and symptoms):     Her mother had a stroke earlier in the week and is receiving care at [facility] which has added a layer of stress.     Glad to have all of her family around her    Contemplating how she is feeling, and  "quality of life. \"I still feel the heavy cloak over me, but it's not all the time\"  Describes grief for changes in her health \"I feel like I\"m the kid at the pool on the other side of the fence watching the other kids swim\".  Trying to connect as best she can.  Uses meditation, guided imagery and knitting as coping tools.     Planning to have Thanksgiving at her home next week and is looking forward to being part of a gathering, even if she can't take food in orally.     Helpful activities: time with family.  Meditating, knitting, tapping.  Uses EMDR techniques.        Helpful cognitions:     Unhelpful and/or triggering or exacerbating factors:     Body-Mind Skills Education: uses tapping and EMDR     Relationships:  and children, extended family    End of Life and Advance Care Planning:     Main therapeutic interventions provided this session include:     Provide psychoeducation and Facilitate processing of thoughts and feelings    Plan:  Will return for psychotherapy with palliative care focus in 3 weeks    Time spent with patient/family:  57  (Start 2:30 end 3:27)    JAIDA Nguyen, [facility]   Palliative Care Clinical   Ph: [phone]      DO NOT SEND ANY LETTERS              "

## 2023-11-16 NOTE — LETTER
11/16/2023         RE: Dinora Mcghee  [address] [place] [facility] [address]        Dear Colleague,    Thank you for referring your patient, Dinora Mcghee, to the [facility] [place]. Please see a copy of my visit note below.    Virtual Visit Details    Type of service:  Video Visit     Originating Location (pt. Location): Home    Distant Location (provider location):  On-site  Platform used for Video Visit: [facility]        Palliative Care Clinical Social Work Return Appointment    PLEASE NOTE:  THIS IS A MENTAL HEALTH NOTE.  OTHER PROVIDERS VIEWING THIS NOTE SHOULD USE THIS ONLY FOR UNDERSTANDING THE CONTEXT OF THE PATIENT'S EXPERIENCE.  TOPICS DESCRIBED IN THIS NOTE SHOULD NOT BE REFERENCED TO THE PATIENT BY MEDICAL PROVIDERS.    Dinora Mcghee is a 58  year old woman with multiple medical conditions including chronic pancreatitis s/p TPAIT, long term complications from gastroparesis, constipation, GERD, migraines.  Had GJ placed Sept 2021, recovery was complicated and she ended up with a venting g-tube.  Due to complications with PO tolerance she had surgical placement of a J tube, and a resection of densely adhered small bowel.  Started on tube feedings, then on TPN.  TPN eventually stopped due to infection.  Has heavy symptom burden that includes chronic nausea and vomiting, fatigue, complex medical management. Seen today to address adjustment disorder with depressed mood and anxiety.     Was discharged from hospital day before our visit.     Mental Status Exam:(List all that apply)      Appearance: Appropriate      Eye Contact: Good       Orientation: Yes, x4      Mood: Depressed grieving       Affect: Appropriate -- tearful       Thought Content: Clear         Thought Form: Logical      Psychomotor Behavior: Normal    Visit themes: Anxiety, trauma, stress     Adjustment to Illness:  discharged from the hospital yesterday.  Now home on TPN. Feeling tired.        Mental Health (thoughts,  "feelings, actions, coping, and symptoms):     Her mother had a stroke earlier in the week and is receiving care at [facility] which has added a layer of stress.     Glad to have all of her family around her    Contemplating how she is feeling, and quality of life. \"I still feel the heavy cloak over me, but it's not all the time\"  Describes grief for changes in her health \"I feel like I\"m the kid at the pool on the other side of the fence watching the other kids swim\".  Trying to connect as best she can.  Uses meditation, guided imagery and knitting as coping tools.     Planning to have Thanksgiving at her home next week and is looking forward to being part of a gathering, even if she can't take food in orally.     Helpful activities: time with family.  Meditating, knitting, tapping.  Uses EMDR techniques.        Helpful cognitions:     Unhelpful and/or triggering or exacerbating factors:     Body-Mind Skills Education: uses tapping and EMDR     Relationships:  and children, extended family    End of Life and Advance Care Planning:     Main therapeutic interventions provided this session include:     Provide psychoeducation and Facilitate processing of thoughts and feelings    Plan:  Will return for psychotherapy with palliative care focus in 3 weeks    Time spent with patient/family:  57  (Start 2:30 end 3:27)    JAIDA Nguyen, [facility]   Palliative Care Clinical   Ph: [phone]      DO NOT SEND ANY LETTERS                Again, thank you for allowing me to participate in the care of your patient.        Sincerely,        VASQUEZ Nguyen  "

## 2023-11-17 ENCOUNTER — TELEPHONE (OUTPATIENT)
Dept: TRANSPLANT | Facility: CLINIC | Age: 58
End: 2023-11-17
Payer: COMMERCIAL

## 2023-11-17 NOTE — TELEPHONE ENCOUNTER
Received a call from [facility] lab about a critical glucose level of 673.  I called Dinora who checked her Yandy and didn't see any readings consistent with that, highest around 170s.  She is talking to endo already about insulin adjustments.  Advised her to continue to monitor blood sugar and let us know if she does get high readings.    Dr. Gomez

## 2023-11-17 NOTE — TELEPHONE ENCOUNTER
Patient reached out to get some guidance from Dr Melissa to see if she should be on any long acting insulin after her recent admission. Message sent to provider for assistance

## 2023-11-20 ENCOUNTER — TELEPHONE (OUTPATIENT)
Dept: INTERNAL MEDICINE | Facility: CLINIC | Age: 58
End: 2023-11-20
Payer: COMMERCIAL

## 2023-11-20 NOTE — TELEPHONE ENCOUNTER
MTM referral from: Transitions of Care (recent hospital discharge or ED visit)    MTM referral outreach attempt #1 on November 20, 2023 at 1:26 PM      Outcome: Patient is not interested at this time because hospital went over things well, will route to MTM Pharmacist/Provider as an FYI. Thank you for the referral.     Use CHARLIE  for the carrier/Plan on the flowsheet      Vicenta Fox - [facility]

## 2023-11-21 ENCOUNTER — PATIENT OUTREACH (OUTPATIENT)
Dept: CARE COORDINATION | Facility: CLINIC | Age: 58
End: 2023-11-21
Payer: COMMERCIAL

## 2023-11-21 ENCOUNTER — TELEPHONE (OUTPATIENT)
Dept: SPIRITUAL SERVICES | Facility: CLINIC | Age: 58
End: 2023-11-21
Payer: COMMERCIAL

## 2023-11-21 NOTE — PATIENT INSTRUCTIONS
It was a pleasure taking care of you today.  I've included a brief summary of our discussion and care plan from today's visit below.  Please review this information with your primary care provider.  ______________________________________________________________________    My recommendations are summarized as follows:    --Continue Amitiza and max dose magnesium oxide  --Once approved by your insurance, start Motegrity and stop Amitiza and magnesium oxide  --Continue good water intake, exercise  --Follow dietary recommendations as you have discussed with Quyen.  You are doing a great job with this!  It is okay to go to a more liquid diet if symptoms worsen.  Continue to monitor your weight  --Let us know if symptoms change or worsen    Return to GI Clinic in 3 months to review your progress.    ______________________________________________________________________    How do I schedule labs, imaging studies, or procedures that were ordered in clinic today?   Labs: To schedule lab appointment at the [facility], use my chart or call [phone]. If you have a [facility] lab closer to home where you are regularly seen you can give them a call.     Procedures: If a colonoscopy, upper endoscopy, breath test, esophageal manometry, or pH impedence was ordered today, our endoscopy team will call you to schedule this. If you have not heard from our endoscopy team within a week, please call [phone] to schedule.     Imaging Studies: If you were scheduled for a CT scan, X-ray, MRI, ultrasound, HIDA scan or other imaging study, please call [phone] to have this scheduled.     Referral: If a referral to another specialty was ordered, expect a phone call or follow instructions above. If you have not heard from anyone regarding your referral in a week, please call our clinic to check the status.     Who do I call with any questions after my visit?  Please be in touch if there are any further questions that  arise following today's visit.  There are multiple ways to contact your gastroenterology care team.        During business hours, you may reach a Gastroenterology nurse at [phone]      To schedule or reschedule an appointment, please call [phone].       You can always send a secure message through [employer].  [employer] messages are answered by your nurse or doctor typically within 24 hours.  Please allow extra time on weekends and holidays.        For urgent/emergent questions after business hours, you may reach the on-call GI Fellow by contacting the [facility]  at [phone].     How will I get the results of any tests ordered?    You will receive all of your results.  If you have signed up for [facility], any tests ordered at your visit will be available to you after your physician reviews them.  Typically this takes 1-2 weeks.  If there are urgent results that require a change in your care plan, your physician or nurse will call you to discuss the next steps.      What is [employer]?  [employer] is a secure way for you to access all of your healthcare records from the [facility].  It is a web based computer program, so you can sign on to it from any location.  It also allows you to send secure messages to your care team.  I recommend signing up for [employer] access if you have not already done so and are comfortable with using a computer.      How to I schedule a follow-up visit?  If you did not schedule a follow-up visit today, please call [phone] to schedule a follow-up office visit.      Sincerely,    Rosanne Marti PA-C  Division of Gastroenterology, Hepatology & Nutrition  [facility]       Home

## 2023-11-21 NOTE — TELEPHONE ENCOUNTER
SPIRITUAL HEALTH SERVICES  Oncology Distress Screening Follow-Up  [facility]    Reason for Contact: Was referred to contact pt Dinora C Taz because she recently scored positive on the oncology distress screen for potential spiritual distress.    Intervention: Documentation reviewed. In consultation with Palliative , Sweta Ghosh, determined psychosocial support and meaning-based work is being provided to Dinora by Sweta at this time as Dinora is being followed by Palliative Care team. Sweta is aware of my availability and will reach out as needs arise.    Plan: Spiritual Health remains available. Please contact as needs arise.    Yoselyn Quintero  Associate   Phone [phone]

## 2023-11-21 NOTE — PROGRESS NOTES
Social Work - Distress Screen Intervention  [facility]    Identified Concern and Score from Distress Screenin. How concerned are you about your ability to eat? (!) 9     2. How concerned are you about unintended weight loss or your current weight? (!) 9     3. How concerned are you about feeling depressed or very sad?  (!) 9     4. How concerned are you about feeling anxious or very scared?  3     5. Do you struggle with the loss of meaning and herminio in your life?  (!) A great deal     6. How concerned are you about work and home life issues that may be affected by your cancer?  (!) 9     7. How concerned are you about knowing what resources are available to help you?  4     8. Do you currently have what you would describe as Buddhist or spiritual struggles? Not at all     9. If you want to be contacted by one of our professionals, I can send a message to them right now.  No data recorded     Date of Distress Screen: 23  Data: At time of last visit with palliative care therapist Sweta Ghosh Dinora was asked questions on oncology distress screen. Due to following closely with palliative care team, and not with oncology team at [place], this clinician did not outreach to Dinora to introduce services.     SW sent message to Sweta Ghosh for her awareness. Sweta was able to address Dinora's distress at time of 23 appointment.   Follow-up Required:   This clinician appreciates Sweta's ongoing engagement and support of Dinora. Oncology social work will not follow in ongoing way.     Fernanda Corbett, MSW, LICSW, OSW-C  Clinical - Adult Oncology  She/Her/Hers  Phone: [phone]  [facility]: M, Thu  *every other Tue, 8am-4:30pm  [facility]: W, F, *every other Tue, 8am-4:30pm

## 2023-11-21 NOTE — PROGRESS NOTES
Clinic Care Coordination Contact    Situation: Patient chart reviewed by care coordinator.    Background: Referred by Inpatient RN for Care Transition related to HC Discharge and Inpatient to Outpatient on 11/15/23.     Per discharge CC note:  Additional Information:  Patient discharge today on TF, home TPN with I. Patient previously on home TPN, TF, new orders being placed to re-start TPN and to update on TF. Patient will not be getting daily IVF flushes as PTA as this fluid being supplied by TPN for now. Patient has HC to resume with [facility]. I has updated with them/scheduled. Per Dr. Almanza, internal medicine team, Dr. Park will follow for home TPN orders/managing,  who has followed patient previously for home TPN. RNCC to cease following case after discharge.  [facility]  Phone # [phone]  Fax # [phone]   HI TPN and TF    [facility]  [address] [place] [address]  Ph: [phone]  Fax: [phone]  HC RN    Assessment: Patient is being followed by Kenya Victor, MONICA with [facility] and Dr. Park with Care Coordination Services. Pt saw Dr. Gomez already in an OP visit in [place] on 11/17. RN will close TCM program due to this.      Plan/Recommendations: BookShout! message sent to patient offering further support. RN will await patient's response on BookShout! for further outreach.     DARIELA ADAMS RN on 11/22/2023 at 9:46 AM

## 2023-11-22 ENCOUNTER — MYC MEDICAL ADVICE (OUTPATIENT)
Dept: GASTROENTEROLOGY | Facility: CLINIC | Age: 58
End: 2023-11-22
Payer: COMMERCIAL

## 2023-11-22 ENCOUNTER — PATIENT OUTREACH (OUTPATIENT)
Dept: GASTROENTEROLOGY | Facility: CLINIC | Age: 58
End: 2023-11-22
Payer: COMMERCIAL

## 2023-11-22 ENCOUNTER — MYC MEDICAL ADVICE (OUTPATIENT)
Dept: CARE COORDINATION | Facility: CLINIC | Age: 58
End: 2023-11-22
Payer: COMMERCIAL

## 2023-11-22 DIAGNOSIS — Z46.59 ENCOUNTER FOR CARE RELATED TO FEEDING TUBE: Primary | ICD-10-CM

## 2023-11-22 DIAGNOSIS — K86.89 PANCREATIC INSUFFICIENCY: ICD-10-CM

## 2023-11-22 DIAGNOSIS — K31.84 GASTROPARESIS: ICD-10-CM

## 2023-11-22 RX ORDER — FAMOTIDINE 40 MG/5ML
20 POWDER, FOR SUSPENSION ORAL DAILY
Qty: 50 ML | Refills: 4 | Status: SHIPPED | OUTPATIENT
Start: 2023-11-22 | End: 2024-03-11

## 2023-11-22 NOTE — TELEPHONE ENCOUNTER
"Patient called in noting increased abdominal pain, \"Her stomach is like a roller coaster\". Wonders if the jtube placed during admit is too long, feels similar to that again. She cannot sleep due to pain. She understands it could be due to antibiotics but is wondering about the tube.     Patient states that she will not go to ER because of the long wait times/chaos there.     Tube last exchanged 11/11/23 by Dr Dodson,     Message sent to Dr. Park      ML  "

## 2023-11-28 ENCOUNTER — TRANSFERRED RECORDS (OUTPATIENT)
Dept: HEALTH INFORMATION MANAGEMENT | Facility: CLINIC | Age: 58
End: 2023-11-28
Payer: COMMERCIAL

## 2023-11-28 NOTE — TELEPHONE ENCOUNTER
"Called to check on patient- says \"I'm ok\", \"is coping well, just slept for 12 hours\"  Pain at tube site \"still pretty intense\", still struggling with constipation, diarrhea    Cannot advance feeds beyond 30ml/hr. Will follow up with PCP for port  Spinal pain stimulator makes her nervous with how weak she feels. She'd rather have [facility] do BAUDILIO in lower abdomen first once she gets to stronger baseline.     Dinora wants her Cogneatube exchanged, Dr. Park says ok to do in [facility], agreed to procedure on 12-8, orders placed, message sent to scheduling.    ML  "

## 2023-11-28 NOTE — PROGRESS NOTES
Assessment & Plan     Type 1 diabetes mellitus without complication (H)    A1C in September 6.3.  Plan to recheck in March.      Malnutrition, unspecified type (H24)  and  History of food intolerance  and  Adult failure to thrive    Dronabinol refill placed on file for 1/14, getting a 60 day supply in prescription.  We'll follow-up in March.    She is currently doing TPN through a PICC.  Order was placed for port replacement, but IR called me with concerns about high risk of reinfection in the setting of TPN.  They suggested a tunneled central line if TPN is going to be continued long term.  Dinora says the long term plan is not yet known- she will discuss this with GI.  If TPN to be continued long term, she would first like to do a consult with IR to discuss the plan with the line further.      - droNABinol (SYNDROS) 5 MG/ML oral soln; Take 0.5 mLs (2.5 mg) by mouth 2 times daily    Hypothyroidism, unspecified type    TSH done recently and wnl, continue current levothyroxine.     - levothyroxine (SYNTHROID/LEVOTHROID) 137 MCG tablet; 1 tablet (137 mcg) by Per G Tube route daily    *    COVID vaccine today.  She will get 2nd Hep B vaccine at pharmacy since we only have the three dose series and she started the two dose series.      34 minutes spent by me on the date of the encounter doing chart review, history and exam, documentation and further activities per the note     MED REC REQUIRED  Post Medication Reconciliation Status: discharge medications reconciled, continue medications without change      Juan Jose Gomez MD  [facility] INTERNAL MEDICINE [place]    Curtis Farias is a 58 year old, presenting for the following health issues:  Hospital F/U (Blood infection.)      HPI       Hospital Follow-up Visit:    Hospital/Nursing Home/IP Rehab Facility: [facility]  Date of Admission: 11/8  Date of Discharge: 11/15  Reason(s) for Admission:  Klebsiella bacteremia with nausea and vomiting.      Was your hospitalization related to COVID-19? No   Problems taking medications regularly:  None  Medication changes since discharge: None  Problems adhering to non-medication therapy:  None    Summary of hospitalization:  [facility] discharge summary reviewed    Source thought to be port which was removed 11/9.  Prescribed levofloxacin through 11/22.  Started TPN on 11/14, started on dronabinol.  Tube feeding and TPN to be continued through home care.     Diagnostic Tests/Treatments reviewed.  Follow up needed: 11/11 blood culture results- final no growth    Other Healthcare Providers Involved in Patient s Care:  Neurology 12/4  Oncology 12/7  GI 3/6           Update since discharge: stable.     Dinora notes that her liver tests were high earlier this week so they are reducing TPN and starting LR.    She met with the pain clinic yesterday     She is having a lot of stress recently.  She is seeing her therapist next week.  Declined medication at this time.    She needs a refill of dronabinol on file- has a two month supply.  Nees a refill of levothyroxine too   Dronabinol seems to be working better than the cannabis vaping.      Plan of care communicated with patient           Thank you for visiting the [facility] today at the [facility]! The following is some information about our clinic:       Review of Systems   Constitutional, endo, GI systems are negative, except as otherwise noted.      Objective    /79 (BP Location: Right arm, Patient Position: Sitting, Cuff Size: Adult Regular)   Pulse 116   Wt 58.7 kg (129 lb 6.4 oz)   SpO2 97%   BMI 19.68 kg/m    Body mass index is 19.68 kg/m .  Physical Exam     GENERAL: alert and no distress.  TPN infusing  EYES: Eyes grossly normal to inspection.  No discharge or erythema, or obvious scleral/conjunctival abnormalities.  RESP: No audible wheeze, cough, or visible cyanosis.   No visible retractions or increased work of breathing.    SKIN: Visible skin clear. No significant rash, abnormal pigmentation or lesions.  NEURO: Cranial nerves grossly intact.  Mentation and speech appropriate for age.  PSYCH: Mentation appears normal, affect flat and occasionally tearful judgement and insight intact, normal speech and appearance well-groomed.

## 2023-11-29 ENCOUNTER — TELEPHONE (OUTPATIENT)
Dept: CARE COORDINATION | Facility: CLINIC | Age: 58
End: 2023-11-29

## 2023-11-29 ENCOUNTER — OFFICE VISIT (OUTPATIENT)
Dept: INTERNAL MEDICINE | Facility: CLINIC | Age: 58
End: 2023-11-29
Payer: COMMERCIAL

## 2023-11-29 VITALS
HEART RATE: 116 BPM | SYSTOLIC BLOOD PRESSURE: 123 MMHG | OXYGEN SATURATION: 97 % | WEIGHT: 129.4 LBS | DIASTOLIC BLOOD PRESSURE: 79 MMHG | BODY MASS INDEX: 19.68 KG/M2

## 2023-11-29 DIAGNOSIS — E46 MALNUTRITION, UNSPECIFIED TYPE (H): ICD-10-CM

## 2023-11-29 DIAGNOSIS — E03.9 HYPOTHYROIDISM, UNSPECIFIED TYPE: ICD-10-CM

## 2023-11-29 DIAGNOSIS — E10.9 TYPE 1 DIABETES MELLITUS WITHOUT COMPLICATION (H): Primary | ICD-10-CM

## 2023-11-29 DIAGNOSIS — Z87.19 HISTORY OF FOOD INTOLERANCE: ICD-10-CM

## 2023-11-29 DIAGNOSIS — R62.7 ADULT FAILURE TO THRIVE: ICD-10-CM

## 2023-11-29 PROCEDURE — 99495 TRANSJ CARE MGMT MOD F2F 14D: CPT | Mod: 25 | Performed by: INTERNAL MEDICINE

## 2023-11-29 PROCEDURE — 91320 SARSCV2 VAC 30MCG TRS-SUC IM: CPT | Performed by: INTERNAL MEDICINE

## 2023-11-29 PROCEDURE — 90480 ADMN SARSCOV2 VAC 1/ONLY CMP: CPT | Performed by: INTERNAL MEDICINE

## 2023-11-29 RX ORDER — LEVOTHYROXINE SODIUM 137 UG/1
137 TABLET ORAL DAILY
Qty: 90 TABLET | Refills: 3 | Status: SHIPPED | OUTPATIENT
Start: 2023-11-29 | End: 2024-04-19 | Stop reason: ALTCHOICE

## 2023-11-29 ASSESSMENT — PATIENT HEALTH QUESTIONNAIRE - PHQ9
SUM OF ALL RESPONSES TO PHQ QUESTIONS 1-9: 9
5. POOR APPETITE OR OVEREATING: SEVERAL DAYS

## 2023-11-29 ASSESSMENT — ANXIETY QUESTIONNAIRES
IF YOU CHECKED OFF ANY PROBLEMS ON THIS QUESTIONNAIRE, HOW DIFFICULT HAVE THESE PROBLEMS MADE IT FOR YOU TO DO YOUR WORK, TAKE CARE OF THINGS AT HOME, OR GET ALONG WITH OTHER PEOPLE: SOMEWHAT DIFFICULT
6. BECOMING EASILY ANNOYED OR IRRITABLE: NOT AT ALL
3. WORRYING TOO MUCH ABOUT DIFFERENT THINGS: SEVERAL DAYS
7. FEELING AFRAID AS IF SOMETHING AWFUL MIGHT HAPPEN: SEVERAL DAYS
5. BEING SO RESTLESS THAT IT IS HARD TO SIT STILL: MORE THAN HALF THE DAYS
1. FEELING NERVOUS, ANXIOUS, OR ON EDGE: MORE THAN HALF THE DAYS
GAD7 TOTAL SCORE: 8
2. NOT BEING ABLE TO STOP OR CONTROL WORRYING: SEVERAL DAYS
GAD7 TOTAL SCORE: 8

## 2023-11-29 NOTE — PROGRESS NOTES
Dinora is a 58 year old that presents in clinic today for the following:     Chief Complaint   Patient presents with    Hospital F/U     Blood infection.           11/29/2023     3:00 PM   Additional Questions   Roomed by KTR   Accompanied by MIMI ()       Screenings from encounters over the past 10 days    No data recorded       Aide Matthews, EMT at 3:05 PM on 11/29/2023

## 2023-11-29 NOTE — TELEPHONE ENCOUNTER
RN spoke with patient in clinic. Patient states that she would like care management services, and her response on MyChart never sent. She has discussed with Alonzo and Kenya and they have both stated that CC with Primary Care would be a great resource for patient. Patient reports that she would like a call to discuss care further, and RN scheduled patient for 12/5 at 9:30am for a phone call to see how pt's care can be simplified and become easier for her.     DARIELA ADAMS RN on 11/29/2023 at 3:47 PM

## 2023-11-30 ENCOUNTER — DOCUMENTATION ONLY (OUTPATIENT)
Dept: INTERNAL MEDICINE | Facility: CLINIC | Age: 58
End: 2023-11-30
Payer: COMMERCIAL

## 2023-11-30 ENCOUNTER — VIRTUAL VISIT (OUTPATIENT)
Dept: TRANSPLANT | Facility: CLINIC | Age: 58
End: 2023-11-30
Attending: PEDIATRICS
Payer: COMMERCIAL

## 2023-11-30 DIAGNOSIS — E13.9 POST-PANCREATECTOMY DIABETES (H): Primary | ICD-10-CM

## 2023-11-30 DIAGNOSIS — Z90.410 POST-PANCREATECTOMY DIABETES (H): Primary | ICD-10-CM

## 2023-11-30 DIAGNOSIS — E89.1 POST-PANCREATECTOMY DIABETES (H): Primary | ICD-10-CM

## 2023-11-30 PROCEDURE — 99215 OFFICE O/P EST HI 40 MIN: CPT | Mod: VID | Performed by: PEDIATRICS

## 2023-11-30 NOTE — PROGRESS NOTES
Type of Form Received: Order    Form Received (Date) 11/30/23   Form Filled out Yes, faxed 12/1   Placed in provider folder Yes

## 2023-11-30 NOTE — LETTER
11/30/2023         RE: Dinora Mcghee  [address] [facility] [address]        Dear Colleague,    Thank you for referring your patient, Dinora Mcghee, to the [facility]. Please see a copy of my visit note below.    [facility]  Islet Autotransplant, Diabetes Follow Up    Problem List:  Patient Active Problem List   Diagnosis    Hypothyroidism    Need for prophylactic immunotherapy    Sprain and strain of other specified sites of hip and thigh    Chondromalacia of patella    Sensorineural hearing loss    Vertiginous syndrome and labyrinthine disorder    Lumbago    Allergic rhinitis due to other allergen    GERD (gastroesophageal reflux disease)    Other type of intractable migraine    History of ERCP    Abdominal pain    S/P ERCP    Post-pancreatectomy diabetes (H)    Pancreatic insufficiency    ACP (advance care planning)    Gastroparesis    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    Incisional hernia, without obstruction or gangrene    Adhesive capsulitis of shoulder, unspecified laterality    S/P hernia repair    Headache, chronic migraine without aura    Chronic pain syndrome    Iron deficiency anemia    Hypoglycemia    Adult failure to thrive    Abdominal pain, generalized    Gastrostomy tube obstruction (H)    Intra-abdominal abscess (H)    Postprocedural intraabdominal abscess    Acquired absence of spleen    Depressive disorder    Diabetes insipidus (H24)    Other specified abnormal immunological findings in serum    Poisoning by drug    Sphincter of Oddi dysfunction    Type 1 diabetes mellitus without complication (H)    Myofascial pain    Feeding intolerance    Acute postoperative abdominal pain    Major depression, single episode    History of food intolerance    Malnutrition, unspecified type (H24)    Nausea and vomiting, unspecified vomiting type    On total parenteral nutrition (TPN)    Fever, unspecified fever cause    Chronic pancreatitis,  unspecified pancreatitis type (H)    Cholangitis (H28)    Abdominal pain, unspecified abdominal location    Bacteremia       HPI:  Dinora is a 58 year old  female here for follow up oflaparoscopic assisted total pancreatectomy, islet cell autotransplant, splenectomy, gastrojejunostomy tube revision, choledochoduodenostomy, duodenojejunostomy, Vera-Y reconstruction performed on 2016.  At the time of the procedure, the patient received:  Total Islet number: [id number].  Total Islet number/k.  Islet equivalents: [id number]  Islet equivalents/kilogram: 4091    Post-surgical course was complicated by two minor procedures for a retained foreign body near GJ site in 2017 and hernia repair 2018, and severe gastroparesis (refractory to J-feeds, domperidone).  She has been insulin independent since about 1 year after surgery.    - Gastroparesis is her major issue post TPIAT. Admitted multiple times. GJ was helpful for short time but did not tolerate well.  Has tried multiple other approaches and continues to worsen, nothing offering benefit.  Tried expanded access domperidone but not helpful  - Also has hypoglycemia (prior treatments SGLT2 inhibitor not helpful, acarbose is helpful when eating, also uses mini glucagon).     At today's visit,   --  Medical management is still complicated by severe gastroparesis.  She continues to be remarkably positive and functional despite gastroparesis, though she is no longer able to work.  -- She is getting nutrition now by:  TPN cycled, and low rate J-tube feeds for nutritional support and to keep intestinal lining healthy.  -- With this regimen we have had to restart and increase long-acting insulin.  She has been on and off of this depending on health and nutritional regimen.  She is now on 7 units daily of the glargine.  She gives this in the morning but cycles TPN overnight so we talked about possibly cycling overnight.  -- Reviewed Yandy and this is getting better.  She is  fairly symptomatic to high or low fluctuations.  Hypoglycemia is less of an issue currently.     Diabetes history:  Current insulin regimen:  Glargine 7 units    For hypoglycemia she currently uses CGM for early detection plus mini dosing of glucagon as needed.     Wearing Yandy    TIR is 80% which is OK but noted that at last visit she was 96% TIR.   Avg BG is a bit high in 150s.             Recent hemoglobin A1c levels:      Component  Ref Range & Units 1 mo ago   Hemoglobin A1c, B  4.2 - 5.6 % 6.2 High    Comment: Hemoglobin A1c values of 5.7-6.4 percent indicate an  increased risk for developing diabetes mellitus. In  diabetic patients, HbA1c goals should be discussed with  healthcare provider.   Resulting Agency [facility]     Hypoglycemia history: mild lows but rapid falls- not occurring recently. The patient has had 0 episodes of severe hypoglycemia (seizure, coma, or neuroglycopenic symptoms severe enough to require assistance from another person).  Blood sugars were reviewed from the patient records and/or the meter download.          Review of systems:  A complete ROS is negative except as noted in HPI above.      Past Medical and Surgical History:  Past Medical History:   Diagnosis Date    Allergic rhinitis, cause unspecified     Allergy to other foods     strawberries, apples, celeries, alice, watermelon    Arthritis     left knee    Choledocholithiasis     long after cholecystectomy    Chronic abdominal pain     Chronic constipation     Chronic nausea     Chronic pancreatitis (H)     Degeneration of lumbar or lumbosacral intervertebral disc     Esophageal reflux     w/ hiatal hernia    Gastroparesis     Hiatal hernia     History of pituitary adenoma     s/p resection    Hypothyroidism     Migraines     Mild hyperlipidemia     On tube feeding diet     presence of GJ tube    Pancreatic disease     PD stricture, 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     PONV (postoperative nausea and vomiting)     Portacath in  place     Type 1 diabetes mellitus without complication (H) 2022    Unspecified hearing loss     25% high frequency R     Past Surgical History:   Procedure Laterality Date    ABDOMEN SURGERY  1999    c sections: 93, 96, 98. endometriosis growth    APPENDECTOMY       SECTION  1993    COLONOSCOPY  2014    COLONOSCOPY N/A 2023    Procedure: COLONOSCOPY, WITH POLYPECTOMY AND BIOPSY;  Surgeon: Percy Chamorro MD;  [facility]    ENDOSCOPIC INSERTION TUBE GASTROSTOMY N/A 2021    Procedure: Gastrojejonostomy placement with jejunopexy, PEG tube exchange;  Surgeon: Zackery Montoya MD;  [facility]    ENDOSCOPIC RETROGRADE CHOLANGIOPANCREATOGRAM N/A 2015    Procedure: ENDOSCOPIC RETROGRADE CHOLANGIOPANCREATOGRAM;  Surgeon: Mandeep Park MD;  [facility]    ENDOSCOPIC RETROGRADE CHOLANGIOPANCREATOGRAM N/A 2016    Procedure: COMBINED ENDOSCOPIC RETROGRADE CHOLANGIOPANCREATOGRAPHY, PLACE TUBE/STENT;  Surgeon: Mandeep Park MD;  [facility]    ENDOSCOPIC RETROGRADE CHOLANGIOPANCREATOGRAM N/A 2016    Procedure: COMBINED ENDOSCOPIC RETROGRADE CHOLANGIOPANCREATOGRAPHY, REMOVE FOREIGN BODY OR STENT/TUBE;  Surgeon: Mandeep Park MD;  [facility]    ENDOSCOPIC RETROGRADE CHOLANGIOPANCREATOGRAM N/A 2016    Procedure: COMBINED ENDOSCOPIC RETROGRADE CHOLANGIOPANCREATOGRAPHY, PLACE TUBE/STENT;  Surgeon: Mandeep Park MD;  [facility]    ENDOSCOPIC RETROGRADE CHOLANGIOPANCREATOGRAM N/A 2016    Procedure: COMBINED ENDOSCOPIC RETROGRADE CHOLANGIOPANCREATOGRAPHY, REMOVE FOREIGN BODY OR STENT/TUBE;  Surgeon: Mandeep Park MD;  [facility]    ENDOSCOPIC ULTRASOUND UPPER GASTROINTESTINAL TRACT (GI) N/A 10/03/2016    Procedure: ENDOSCOPIC ULTRASOUND, ESOPHAGOSCOPY / UPPER GASTROINTESTINAL TRACT (GI);  Surgeon: Guru Jose Rodas MD;  [facility]    ENT SURGERY   1989    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    ENTEROSCOPY SMALL BOWEL N/A 02/03/2022    Procedure: ENTEROSCOPY with possible fistula closure;  Surgeon: Francisco Dodson MD;  Location:  GI    ESOPHAGOSCOPY, GASTROSCOPY, DUODENOSCOPY (EGD), COMBINED N/A 06/24/2015    Procedure: COMBINED ESOPHAGOSCOPY, GASTROSCOPY, DUODENOSCOPY (EGD), REMOVE FOREIGN BODY;  Surgeon: Mandeep Park MD;  [facility]    ESOPHAGOSCOPY, GASTROSCOPY, DUODENOSCOPY (EGD), COMBINED N/A 10/25/2015    Procedure: COMBINED ESOPHAGOSCOPY, GASTROSCOPY, DUODENOSCOPY (EGD);  Surgeon: Sammy Amaro MD;  [facility]    ESOPHAGOSCOPY, GASTROSCOPY, DUODENOSCOPY (EGD), COMBINED N/A 10/25/2015    Procedure: COMBINED ESOPHAGOSCOPY, GASTROSCOPY, DUODENOSCOPY (EGD), BIOPSY SINGLE OR MULTIPLE;  Surgeon: Sammy Amaro MD;  [facility]    ESOPHAGOSCOPY, GASTROSCOPY, DUODENOSCOPY (EGD), COMBINED N/A 01/31/2023    Procedure: ESOPHAGOGASTRODUODENOSCOPY (EGD) with peg replacement ;  Surgeon: Mandeep Park MD;  [facility]    ESOPHAGOSCOPY, GASTROSCOPY, DUODENOSCOPY (EGD), DILATATION, COMBINED      EXCISE LESION TRUNK N/A 04/17/2017    Procedure: EXCISE LESION TRUNK;  Removal of Abdominal Foreign Body;  Surgeon: Nestor Phoenix MD;  [facility]    HC ESOPH/GAS REFLUX TEST W NASAL IMPED >1 HR N/A 11/19/2015    Procedure: ESOPHAGEAL IMPEDENCE FUNCTION TEST WITH 24 HOUR PH GREATER THAN 1 HOUR;  Surgeon: Thiago Apple MD;  [facility]    HC UGI ENDOSCOPY DIAG W BIOPSY  09/17/2008    HC UGI ENDOSCOPY DIAG W BIOPSY  09/27/2012    HC UGI ENDOSCOPY W ESOPHAGEAL DILATION BALLOON <30MM  09/17/2008    HC UGI ENDOSCOPY W EUS N/A 05/05/2015    Procedure: COMBINED ENDOSCOPIC ULTRASOUND, ESOPHAGOSCOPY, GASTROSCOPY, DUODENOSCOPY (EGD);  Surgeon: Wm Dueñas MD;  [facility]    HC WRIST ARTHROSCOP,RELEASE XVERS LIG Bilateral 12/17/2008    INJECT TRANSVERSUS ABDOMINIS PLANE (TAP) BLOCK BILATERAL Left  09/22/2016    Procedure: INJECT TRANSVERSUS ABDOMINIS PLANE (TAP) BLOCK BILATERAL;  Surgeon: Dickson Corrigan MD;  [facility]    INJECT TRIGGER POINT Bilateral 09/08/2022    Procedure: Abdominal trigger point injection with ultrasound;  Surgeon: Monika Mahajan MD;  [facility]    INJECT TRIGGER POINT SINGLE / MULTIPLE 3 OR MORE MUSCLES Right 11/15/2022    Procedure: Trigger point injections right abdomen with ultrasound guidance;  Surgeon: Monika Mahajan MD;  Location: [facility]    IR CHEST PORT PLACEMENT < 5 YRS OF AGE  06/10/2022    IR PORT REMOVAL RIGHT  11/09/2023    laparoscopic pineda  01/01/1995    LAPAROSCOPIC HERNIORRHAPHY INCISIONAL N/A 08/23/2018    Procedure: LAPAROSCOPIC HERNIORRHAPHY INCISIONAL;  Laparoscopic Incisional Hernia Repair with Symbotex Mesh Implant;  Surgeon: Nestor Phoenix MD;  [facility]    LAPAROSCOPIC PANCREATECTOMY, TRANSPLANT AUTO ISLET CELL N/A 12/28/2016    Procedure: LAPAROSCOPIC PANCREATECTOMY, TRANSPLANT AUTO ISLET CELL;  Surgeon: Nestor Phoenix MD;  [facility]    LAPAROTOMY EXPLORATORY N/A 01/31/2023    Procedure: Exploratory Laparotomy, lysis of adhesions, Perforated J-Junostomy Resection, Replace J-Junostomy site;  Surgeon: Elver Bauer MD;  [facility]    LAPAROTOMY, LYSIS ADHESIONS, COMBINED N/A 01/31/2023    Procedure: Dilatation of jejunostomy feeding tube, track with placement of jejunostomy tube with fluoroscopy;  Surgeon: Elver Bauer MD;  [facility]    PICC DOUBLE LUMEN PLACEMENT Left 11/13/2023    Basilic Vein 5F DL 43 cm    REMOVE AND REPLACE BREAST IMPLANT PROSTHESIS N/A 12/30/2022    Procedure: Exploratory Laparotomy, lysis of adhesions, small bowel resection. Placement of gastric jejunostomy for enteral feeding.;  Surgeon: Elver Bauer MD;  [facility]    REMOVE GASTROSTOMY TUBE ADULT N/A 01/28/2022    Procedure: REMOVAL, GASTROSTOMY TUBE, ADULT;  Surgeon: Mandeep Park MD;  [facility]    REPLACE GASTROJEJUNOSTOMY TUBE, PERCUTANEOUS N/A 2021    Procedure: GASTROJEJUNOSTOMY TUBE PLACEMENT, PERCUTANEOUS, WITH GASTROPEXY;  Surgeon: Mandeep Park MD;  [facility]    REPLACE GASTROJEJUNOSTOMY TUBE, PERCUTANEOUS N/A 2021    Procedure: REPLACEMENT, GASTROJEJUNOSTOMY TUBE, PERCUTANEOUS;  Surgeon: Zackery Montoya MD;  [facility]    REPLACE GASTROJEJUNOSTOMY TUBE, PERCUTANEOUS N/A 2022    Procedure: REPLACEMENT, GASTROJEJUNOSTOMY TUBE, PERCUTANEOUS;  Surgeon: Mandeep Park MD;  [facility]    REPLACE GASTROJEJUNOSTOMY TUBE, PERCUTANEOUS N/A 2022    Procedure: REPLACEMENT, GASTROJEJUNOSTOMY TUBE, PERCUTANEOUS with ENDOSCOPIC SUTURING.;  Surgeon: Mandeep Park MD;  [facility]    REPLACE GASTROJEJUNOSTOMY TUBE, PERCUTANEOUS N/A 2023    Procedure: Replace Gastrojejunostomy Tube, Percutaneous;  Surgeon: Mandeep Park MD;  [facility]    REPLACE GASTROJEJUNOSTOMY TUBE, PERCUTANEOUS N/A 2023    Procedure: Replace Gastrojejunostomy Tube, Percutaneous;  Surgeon: Francisco Dodson MD;  [facility]    REPLACE JEJUNOSTOMY TUBE, PERCUTANEOUS N/A 09/10/2021    Procedure: UPPER ENDOSCOPY, REPLACEMENT OF PERCUTANEOUS GASTROJEJUNOSTOMY TUBE, T-TAG GASTROPEXY;  Surgeon: Zackery Montoya MD;  [facility]    REPLACE JEJUNOSTOMY TUBE, PERCUTANEOUS N/A 2021    Procedure: REPLACEMENT, JEJUNOSTOMY TUBE, PERCUTANEOUS;  Surgeon: Mandeep Park MD;  [facility]    REPLACE JEJUNOSTOMY TUBE, PERCUTANEOUS N/A 2023    Procedure: REPLACEMENT, JEJUNOSTOMY TUBE, PERCUTANEOUS;  Surgeon: Mandeep Park MD;  [facility]    REPLACE JEJUNOSTOMY TUBE, PERCUTANEOUS  2023    Procedure: Replace Jejunostomy Tube, Percutaneous;  Surgeon: Mandeep Park MD;  [facility]    transphenoidal pituitary resection  1990    [facility]  DELIVERY ONLY  1996    [facility]  DELIVERY  ONLY  01/01/1998    repeat c section with incidental cystotomy with repair    ZZC EXCIS PITUITARY,TRANSNASAL/SEPTAL  01/01/1980    pituitary tumor removed for diabetes insipidus    ZZC TOTAL ABDOM HYSTERECTOMY  01/01/1999    w/ bilateral salpingoophorectomy        Family History:  New changes since last visit:  none  Family History   Problem Relation Age of Onset    Lipids Mother     Hypertension Mother     Thyroid Disease Mother     Depression Mother     Angina Mother     GERD Mother     Skin Cancer Mother     Migraines Mother     Autoimmune Disease Mother     Hyperlipidemia Mother     Mental Illness Mother     Cerebrovascular Disease Mother         11/2023    Other Cancer Mother         skin-basil cell    Anxiety Disorder Mother     Eye Disorder Father         cataract, detached retina    Myocardial Infarction Father 60    Lipids Father     Cerebrovascular Disease Father     Depression Father     Substance Abuse Father     Anesthesia Reaction Father         stroke right after surgery    Cataracts Father     Osteoarthritis Father     Ulcerative Colitis Father     Autoimmune Disease Father     Heart Disease Father     Hyperlipidemia Father     Mental Illness Father     Other Cancer Father         myloma    Anxiety Disorder Father     Interpersonal Violence Sister     Coronary Artery Disease Sister         angioplasty    GERD Sister     Substance Abuse Sister     Cerebrovascular Disease Sister         2005    Depression Sister     Thyroid Disease Sister     Eye Disorder Maternal Grandmother         cataract    Thyroid Disease Maternal Grandmother     Diabetes Maternal Grandfather     Eye Disorder Paternal Grandmother         cataract    Diabetes Paternal Grandmother     Eye Disorder Paternal Grandfather         cataract    Diabetes Paternal Grandfather     Substance Abuse Paternal Grandfather     Eye Disorder Son         ptosis    Depression Son     Anxiety Disorder Son     Heart Disease Paternal Aunt     Diabetes  Paternal Aunt     Diabetes Paternal Uncle     Heart Disease Paternal Uncle     Depression Nephew     Anxiety Disorder Nephew     Thyroid Disease Nephew     Diabetes Type 2  Cousin         paternal cousin    Autoimmune Disease Cousin     Autoimmune Disease Sister     Depression Sister     Mental Illness Sister     Substance Abuse Sister     Thyroid Disease Sister     Depression Son     Mental Illness Son     Anxiety Disorder Son     Thyroid Disease Nephew     Anxiety Disorder Nephew        Social History:  Social History     Social History Narrative     with 3 children and a dog.  No smoking, etoh or drug use.  Worked as a  for [employer] in the past.  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     Physical Exam:  Vitals: There were no vitals taken for this visit.  BMI= There is no height or weight on file to calculate BMI.  General:  Appearance is normal, no acute distress  HEENT:  NC/AT, sclera appear white  Neck:  No obvious thyromegaly  CV/Lungs:  Non distressed breathing  Skin:  No apparent rashes  Neuro:  Normal mental status  Psych:  Normal affect          Assessment:  1.  Post pancreatectomy diabetes mellitus, s/p total pancreatectomy and islet autotransplant.  (ICD-10 E89.1)  2.  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   Gastroparesis  4.  Nausea, vomiting      Dinora is a 58 year old with history of chronic pancreatitis who is s/p total pancreatectomy and islet autotransplant.  She has been insulin independent with A1c in goal range in the past, but more recently with struggles around gastroparesis, and need for TPN and tube feeds she is back on low doses of basal insulin. Needs CGM because of her hypoglycemia hx.    She has      Plan:  1.  Changes to current diabetes regimen:  Patient Instructions   Keep the same glargine dose.  Let's try adjusting the timing to start at time that the TPN is started.     2.  Frequency of blood sugar checks:  Keep wearing Yandy-- this is much better for Dinora than fingersticks because with fingersticks we miss the really critical data of the post prandial excursions.    3.  Continue routine follow up for autoislet transplant patients:  Mixed meal test (6 mL/kg BoostHP to max of 360 mL) at 3 months, 6 months, and once a year post transplant.  Hemoglobin A1c levels at these time points and quarterly.    4.  Other issues addressed today:  none    Follow up:  4 mos    Contact me for questions at [phone] or [phone].  Emergency number to reach pediatric endocrinology after hours is [phone].    Dr. Gloria Melissa MD  [facility] [place]  Director of Research, Islet Auto-transplant Program  Phone:  [phone]  Electronically signed: December 3, 2023 at 9:27 PM          Review of the result(s) of each unique test - HbA1c, yandy  40 minutes spent on the date of the encounter doing chart review, history and exam, documentation, downloading and reviewing CGM data,  and further activities per the note      Again, thank you for allowing me to participate in the care of your patient.        Sincerely,        Gloria Melissa MD

## 2023-12-01 ENCOUNTER — MEDICAL CORRESPONDENCE (OUTPATIENT)
Dept: HEALTH INFORMATION MANAGEMENT | Facility: CLINIC | Age: 58
End: 2023-12-01
Payer: COMMERCIAL

## 2023-12-04 ENCOUNTER — OFFICE VISIT (OUTPATIENT)
Dept: NEUROLOGY | Facility: CLINIC | Age: 58
End: 2023-12-04
Payer: COMMERCIAL

## 2023-12-04 DIAGNOSIS — G43.719 INTRACTABLE CHRONIC MIGRAINE WITHOUT AURA AND WITHOUT STATUS MIGRAINOSUS: Primary | ICD-10-CM

## 2023-12-04 PROCEDURE — 64615 CHEMODENERV MUSC MIGRAINE: CPT | Performed by: NURSE PRACTITIONER

## 2023-12-04 ASSESSMENT — HEADACHE IMPACT TEST (HIT 6)
WHEN YOU HAVE HEADACHES HOW OFTEN IS THE PAIN SEVERE: VERY OFTEN
WHEN YOU HAVE A HEADACHE HOW OFTEN DO YOU WISH YOU COULD LIE DOWN: ALWAYS
HIT6 TOTAL SCORE: 68
HOW OFTEN HAVE YOU FELT TOO TIRED TO WORK BECAUSE OF YOUR HEADACHES: VERY OFTEN
HOW OFTEN DID HEADACHS LIMIT CONCENTRATION ON WORK OR DAILY ACTIVITY: VERY OFTEN
HOW OFTEN DO HEADACHES LIMIT YOUR DAILY ACTIVITIES: VERY OFTEN
HOW OFTEN HAVE YOU FELT FED UP OR IRRITATED BECAUSE OF YOUR HEADACHES: VERY OFTEN

## 2023-12-04 NOTE — LETTER
"12/4/2023       RE: Dinora Mcghee  [address] [facility] [address]       Dear Colleague,    Thank you for referring your patient, Dinora Mcghee, to the [facility] [place] at [facility]. Please see a copy of my visit note below.    PROCEDURE NOTE:     [facility]  Botulinum Toxin Procedure     Headache Neurology     September 25, 2023     Procedure:  OnabotulinumtoxinA injections for chronic migraine  Indication:  Chronic migraine     Dinora suffers from severe intractable headaches.  She was referred by for onabotulinumtoxinA injections for headache.  Risks, benefits, and alternatives were discussed.  All questions were answered and consent given.  She decided to proceed with the injections.      Headache history, from initial consult note: See Initial Headache Clinic visit on 8/3/2022 for details     Prior to initiation of botulinum toxin injections, Ms. Mcghee reported 13-15 headache days per month, with 3-4 severe headache days per month. Her headaches are quite disabling and often interfere with her ability to function normally.     Date of last injections: 6/19/2023     Ms. Mcghee reports 1 headache day every 10 days per month currently and more headaches in the 3 weeks leading to Botox -11 headache days in 21 day, with none in the 10 weeks but 6 really severe headache days in the past 3 weeks .  She has noticed a wearing off phenomenon prior to this round of botulinum toxin injections, lasting 3 weeks.     Botulinum toxin injections have improved their functioning: able to read better, walking and not in bed      She has attempted other migraine prophylactic treatments in the past, which have included: Amitriptyline for 15 years and stopped   Topiramate  Sumatriptan   lyrica-\"brain fog\"  depakote -allergies  Compazine +benedryl  Promethazine IV three times per week for   scopalamine patch     She currently takes " amitriptyline, promethazine, zolmitriptine for headache prevention.     Ms. kSinners pain was assessed prior to the procedure.  She rated her pain today as 6 out of 10.     Procedural Pause: Procedural pause was conducted to verify correct patient identity, procedure to be performed, correct side and site, correct patient position, and special requirements. Appropriate hand hygiene was utilized, and each injection site was prepped with alcohol wipe or Chloraprep swab.      Procedure Details: 200 units of onabotulinumtoxinA was diluted in 4 mL 0.9% normal saline. A total of 155 units of onabotulinumtoxinA were injected using 30 gauge 0.5 in needles into the muscles listed below. 45 units of onabotulinumtoxinA were wasted.         GOAL OF PROCEDURE:  The goal of this procedure is to decrease pain and enhance functional independence.     CONSENT:  The risks, benefits, and treatment options were discussed with the patient who agreed to proceed.     Written consent was obtained      EQUIPMENT USED:  Needles-30 gauge, 0.5 inches for injections  Four 1-ml tuberculin syringes for injections  One sodium chloride 10 ml vial preservative free  Alcohol swabs     SKIN PREPARATION:  Skin preparation was performed using an alcohol wipe.        AREA/MUSCLE INJECTED:  155 units of Botox  Right upper Trapezius (upper cervical) - 5 units of Botox at 3 site/s.   Left upper Trapezius (upper cervical) - 5 units of Botox at 3 site/s.      Right cervical paraspinals - 5 units of Botox at 2 site/s.   Left cervical paraspinals - 5 units of Botox at 2 site/s.      Left occipitalis - 5 units of Botox at 3 site/s.  Right occipitalis -5 units of Botox at 3 site/s     Right Frontalis - 5 units of Botox at 2 site/s.  Left Frontalis - 5 units of Botox at 2 site/s.     Right Temporalis - 5 units of Botox at 4 site/s.  Left Temporalis - 5 units of Botox at 4 site/s.     Right  - 5 units of Botox at 1 site/s.              Left  - 5  units of Botox at 1 site/s.     Procerus - 5 units of Botox at 1 site/s.     RESPONSE TO PROCEDURE:  tolerated the procedure well and there were no immediate complications.  Patient was allowed to recover for an appropriate period of time and was discharged home in stable condition.     FOLLOW UP:     Repeat Botox injections in 12 weeks        This procedure was performed under a hospital privileging agreement with Dr. Briseno           Again, thank you for allowing me to participate in the care of your patient.      Sincerely,    NATALY Hoffman CNP

## 2023-12-04 NOTE — PROGRESS NOTES
[facility]  Islet Autotransplant, Diabetes Follow Up    Problem List:  Patient Active Problem List   Diagnosis    Hypothyroidism    Need for prophylactic immunotherapy    Sprain and strain of other specified sites of hip and thigh    Chondromalacia of patella    Sensorineural hearing loss    Vertiginous syndrome and labyrinthine disorder    Lumbago    Allergic rhinitis due to other allergen    GERD (gastroesophageal reflux disease)    Other type of intractable migraine    History of ERCP    Abdominal pain    S/P ERCP    Post-pancreatectomy diabetes (H)    Pancreatic insufficiency    ACP (advance care planning)    Gastroparesis    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    Incisional hernia, without obstruction or gangrene    Adhesive capsulitis of shoulder, unspecified laterality    S/P hernia repair    Headache, chronic migraine without aura    Chronic pain syndrome    Iron deficiency anemia    Hypoglycemia    Adult failure to thrive    Abdominal pain, generalized    Gastrostomy tube obstruction (H)    Intra-abdominal abscess (H)    Postprocedural intraabdominal abscess    Acquired absence of spleen    Depressive disorder    Diabetes insipidus (H24)    Other specified abnormal immunological findings in serum    Poisoning by drug    Sphincter of Oddi dysfunction    Type 1 diabetes mellitus without complication (H)    Myofascial pain    Feeding intolerance    Acute postoperative abdominal pain    Major depression, single episode    History of food intolerance    Malnutrition, unspecified type (H24)    Nausea and vomiting, unspecified vomiting type    On total parenteral nutrition (TPN)    Fever, unspecified fever cause    Chronic pancreatitis, unspecified pancreatitis type (H)    Cholangitis (H28)    Abdominal pain, unspecified abdominal location    Bacteremia       HPI:  Dinora is a 58 year old  female here for follow up oflaparoscopic assisted total pancreatectomy, islet cell autotransplant,  splenectomy, gastrojejunostomy tube revision, choledochoduodenostomy, duodenojejunostomy, Vera-Y reconstruction performed on 2016.  At the time of the procedure, the patient received:  Total Islet number: [id number].  Total Islet number/k.  Islet equivalents: [id number]  Islet equivalents/kilogram: 4091    Post-surgical course was complicated by two minor procedures for a retained foreign body near GJ site in 2017 and hernia repair 2018, and severe gastroparesis (refractory to J-feeds, domperidone).  She has been insulin independent since about 1 year after surgery.    - Gastroparesis is her major issue post TPIAT. Admitted multiple times. GJ was helpful for short time but did not tolerate well.  Has tried multiple other approaches and continues to worsen, nothing offering benefit.  Tried expanded access domperidone but not helpful  - Also has hypoglycemia (prior treatments SGLT2 inhibitor not helpful, acarbose is helpful when eating, also uses mini glucagon).     At today's visit,   --  Medical management is still complicated by severe gastroparesis.  She continues to be remarkably positive and functional despite gastroparesis, though she is no longer able to work.  -- She is getting nutrition now by:  TPN cycled, and low rate J-tube feeds for nutritional support and to keep intestinal lining healthy.  -- With this regimen we have had to restart and increase long-acting insulin.  She has been on and off of this depending on health and nutritional regimen.  She is now on 7 units daily of the glargine.  She gives this in the morning but cycles TPN overnight so we talked about possibly cycling overnight.  -- Reviewed Yandy and this is getting better.  She is fairly symptomatic to high or low fluctuations.  Hypoglycemia is less of an issue currently.     Diabetes history:  Current insulin regimen:  Glargine 7 units    For hypoglycemia she currently uses CGM for early detection plus mini dosing of glucagon as  needed.     Wearing Yandy    TIR is 80% which is OK but noted that at last visit she was 96% TIR.   Avg BG is a bit high in 150s.             Recent hemoglobin A1c levels:      Component  Ref Range & Units 1 mo ago   Hemoglobin A1c, B  4.2 - 5.6 % 6.2 High    Comment: Hemoglobin A1c values of 5.7-6.4 percent indicate an  increased risk for developing diabetes mellitus. In  diabetic patients, HbA1c goals should be discussed with  healthcare provider.   Resulting Agency [facility]     Hypoglycemia history: mild lows but rapid falls- not occurring recently. The patient has had 0 episodes of severe hypoglycemia (seizure, coma, or neuroglycopenic symptoms severe enough to require assistance from another person).  Blood sugars were reviewed from the patient records and/or the meter download.          Review of systems:  A complete ROS is negative except as noted in HPI above.      Past Medical and Surgical History:  Past Medical History:   Diagnosis Date    Allergic rhinitis, cause unspecified     Allergy to other foods     strawberries, apples, celeries, alice, watermelon    Arthritis     left knee    Choledocholithiasis     long after cholecystectomy    Chronic abdominal pain     Chronic constipation     Chronic nausea     Chronic pancreatitis (H)     Degeneration of lumbar or lumbosacral intervertebral disc     Esophageal reflux     w/ hiatal hernia    Gastroparesis     Hiatal hernia     History of pituitary adenoma     s/p resection    Hypothyroidism     Migraines     Mild hyperlipidemia     On tube feeding diet     presence of GJ tube    Pancreatic disease     PD stricture, 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     PONV (postoperative nausea and vomiting)     Portacath in place     Type 1 diabetes mellitus without complication (H) 5/9/2022    Unspecified hearing loss     25% high frequency R     Past Surgical History:   Procedure Laterality Date    ABDOMEN SURGERY  01/01/1999    c sections: 8/23/93, 6/23/96, 4/9/98.  endometriosis growth    APPENDECTOMY       SECTION  1993    COLONOSCOPY  2014    COLONOSCOPY N/A 2023    Procedure: COLONOSCOPY, WITH POLYPECTOMY AND BIOPSY;  Surgeon: Percy Chamorro MD;  [facility]    ENDOSCOPIC INSERTION TUBE GASTROSTOMY N/A 2021    Procedure: Gastrojejonostomy placement with jejunopexy, PEG tube exchange;  Surgeon: Zackery Montoya MD;  [facility]    ENDOSCOPIC RETROGRADE CHOLANGIOPANCREATOGRAM N/A 2015    Procedure: ENDOSCOPIC RETROGRADE CHOLANGIOPANCREATOGRAM;  Surgeon: Mandeep Park MD;  [facility]    ENDOSCOPIC RETROGRADE CHOLANGIOPANCREATOGRAM N/A 2016    Procedure: COMBINED ENDOSCOPIC RETROGRADE CHOLANGIOPANCREATOGRAPHY, PLACE TUBE/STENT;  Surgeon: Mandeep Park MD;  [facility]    ENDOSCOPIC RETROGRADE CHOLANGIOPANCREATOGRAM N/A 2016    Procedure: COMBINED ENDOSCOPIC RETROGRADE CHOLANGIOPANCREATOGRAPHY, REMOVE FOREIGN BODY OR STENT/TUBE;  Surgeon: Mandeep Park MD;  [facility]    ENDOSCOPIC RETROGRADE CHOLANGIOPANCREATOGRAM N/A 2016    Procedure: COMBINED ENDOSCOPIC RETROGRADE CHOLANGIOPANCREATOGRAPHY, PLACE TUBE/STENT;  Surgeon: Mandeep Park MD;  [facility]    ENDOSCOPIC RETROGRADE CHOLANGIOPANCREATOGRAM N/A 2016    Procedure: COMBINED ENDOSCOPIC RETROGRADE CHOLANGIOPANCREATOGRAPHY, REMOVE FOREIGN BODY OR STENT/TUBE;  Surgeon: Mandeep Park MD;  [facility]    ENDOSCOPIC ULTRASOUND UPPER GASTROINTESTINAL TRACT (GI) N/A 10/03/2016    Procedure: ENDOSCOPIC ULTRASOUND, ESOPHAGOSCOPY / UPPER GASTROINTESTINAL TRACT (GI);  Surgeon: Guru Jose Rodas MD;  [facility]    ENT SURGERY  1989    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    ENTEROSCOPY SMALL BOWEL N/A 2022    Procedure: ENTEROSCOPY with possible fistula closure;  Surgeon: Francisco Dodson MD;  Location:  GI    ESOPHAGOSCOPY, GASTROSCOPY, DUODENOSCOPY (EGD),  COMBINED N/A 06/24/2015    Procedure: COMBINED ESOPHAGOSCOPY, GASTROSCOPY, DUODENOSCOPY (EGD), REMOVE FOREIGN BODY;  Surgeon: Mandeep Park MD;  [facility]    ESOPHAGOSCOPY, GASTROSCOPY, DUODENOSCOPY (EGD), COMBINED N/A 10/25/2015    Procedure: COMBINED ESOPHAGOSCOPY, GASTROSCOPY, DUODENOSCOPY (EGD);  Surgeon: Sammy Amaro MD;  [facility]    ESOPHAGOSCOPY, GASTROSCOPY, DUODENOSCOPY (EGD), COMBINED N/A 10/25/2015    Procedure: COMBINED ESOPHAGOSCOPY, GASTROSCOPY, DUODENOSCOPY (EGD), BIOPSY SINGLE OR MULTIPLE;  Surgeon: Sammy Amaro MD;  [facility]    ESOPHAGOSCOPY, GASTROSCOPY, DUODENOSCOPY (EGD), COMBINED N/A 01/31/2023    Procedure: ESOPHAGOGASTRODUODENOSCOPY (EGD) with peg replacement ;  Surgeon: Mandeep Park MD;  [facility]    ESOPHAGOSCOPY, GASTROSCOPY, DUODENOSCOPY (EGD), DILATATION, COMBINED      EXCISE LESION TRUNK N/A 04/17/2017    Procedure: EXCISE LESION TRUNK;  Removal of Abdominal Foreign Body;  Surgeon: Nestor Phoenix MD;  [facility]    HC ESOPH/GAS REFLUX TEST W NASAL IMPED >1 HR N/A 11/19/2015    Procedure: ESOPHAGEAL IMPEDENCE FUNCTION TEST WITH 24 HOUR PH GREATER THAN 1 HOUR;  Surgeon: Thiago Apple MD;  [facility]    HC UGI ENDOSCOPY DIAG W BIOPSY  09/17/2008    HC UGI ENDOSCOPY DIAG W BIOPSY  09/27/2012    HC UGI ENDOSCOPY W ESOPHAGEAL DILATION BALLOON <30MM  09/17/2008    HC UGI ENDOSCOPY W EUS N/A 05/05/2015    Procedure: COMBINED ENDOSCOPIC ULTRASOUND, ESOPHAGOSCOPY, GASTROSCOPY, DUODENOSCOPY (EGD);  Surgeon: Wm Dueñas MD;  [facility]    HC WRIST ARTHROSCOP,RELEASE XVERS LIG Bilateral 12/17/2008    INJECT TRANSVERSUS ABDOMINIS PLANE (TAP) BLOCK BILATERAL Left 09/22/2016    Procedure: INJECT TRANSVERSUS ABDOMINIS PLANE (TAP) BLOCK BILATERAL;  Surgeon: Dickson Corrigan MD;  [facility]    INJECT TRIGGER POINT Bilateral 09/08/2022    Procedure: Abdominal trigger point injection with ultrasound;   Surgeon: Monika Mahajan MD;  [facility]    INJECT TRIGGER POINT SINGLE / MULTIPLE 3 OR MORE MUSCLES Right 11/15/2022    Procedure: Trigger point injections right abdomen with ultrasound guidance;  Surgeon: Monika Mahajan MD;  [facility]    IR CHEST PORT PLACEMENT < 5 YRS OF AGE  06/10/2022    IR PORT REMOVAL RIGHT  11/09/2023    laparoscopic pineda  01/01/1995    LAPAROSCOPIC HERNIORRHAPHY INCISIONAL N/A 08/23/2018    Procedure: LAPAROSCOPIC HERNIORRHAPHY INCISIONAL;  Laparoscopic Incisional Hernia Repair with Symbotex Mesh Implant;  Surgeon: Nestor Phoenix MD;  [facility]    LAPAROSCOPIC PANCREATECTOMY, TRANSPLANT AUTO ISLET CELL N/A 12/28/2016    Procedure: LAPAROSCOPIC PANCREATECTOMY, TRANSPLANT AUTO ISLET CELL;  Surgeon: Nestor Phoenix MD;  [facility]    LAPAROTOMY EXPLORATORY N/A 01/31/2023    Procedure: Exploratory Laparotomy, lysis of adhesions, Perforated J-Junostomy Resection, Replace J-Junostomy site;  Surgeon: Elver Bauer MD;  [facility]    LAPAROTOMY, LYSIS ADHESIONS, COMBINED N/A 01/31/2023    Procedure: Dilatation of jejunostomy feeding tube, track with placement of jejunostomy tube with fluoroscopy;  Surgeon: Elver Bauer MD;  [facility]    PICC DOUBLE LUMEN PLACEMENT Left 11/13/2023    Basilic Vein 5F DL 43 cm    REMOVE AND REPLACE BREAST IMPLANT PROSTHESIS N/A 12/30/2022    Procedure: Exploratory Laparotomy, lysis of adhesions, small bowel resection. Placement of gastric jejunostomy for enteral feeding.;  Surgeon: Elver Bauer MD;  [facility]    REMOVE GASTROSTOMY TUBE ADULT N/A 01/28/2022    Procedure: REMOVAL, GASTROSTOMY TUBE, ADULT;  Surgeon: Mandeep Park MD;  [facility]    REPLACE GASTROJEJUNOSTOMY TUBE, PERCUTANEOUS N/A 09/07/2021    Procedure: GASTROJEJUNOSTOMY TUBE PLACEMENT, PERCUTANEOUS, WITH GASTROPEXY;  Surgeon: Mandeep Park MD;  [facility]    REPLACE GASTROJEJUNOSTOMY TUBE,  PERCUTANEOUS N/A 2021    Procedure: REPLACEMENT, GASTROJEJUNOSTOMY TUBE, PERCUTANEOUS;  Surgeon: Zackery Montoya MD;  [facility]    REPLACE GASTROJEJUNOSTOMY TUBE, PERCUTANEOUS N/A 2022    Procedure: REPLACEMENT, GASTROJEJUNOSTOMY TUBE, PERCUTANEOUS;  Surgeon: Mandeep Park MD;  [facility]    REPLACE GASTROJEJUNOSTOMY TUBE, PERCUTANEOUS N/A 2022    Procedure: REPLACEMENT, GASTROJEJUNOSTOMY TUBE, PERCUTANEOUS with ENDOSCOPIC SUTURING.;  Surgeon: Mandeep Park MD;  [facility]    REPLACE GASTROJEJUNOSTOMY TUBE, PERCUTANEOUS N/A 2023    Procedure: Replace Gastrojejunostomy Tube, Percutaneous;  Surgeon: Mandeep Park MD;  [facility]    REPLACE GASTROJEJUNOSTOMY TUBE, PERCUTANEOUS N/A 2023    Procedure: Replace Gastrojejunostomy Tube, Percutaneous;  Surgeon: Francisco Dodson MD;  [facility]    REPLACE JEJUNOSTOMY TUBE, PERCUTANEOUS N/A 09/10/2021    Procedure: UPPER ENDOSCOPY, REPLACEMENT OF PERCUTANEOUS GASTROJEJUNOSTOMY TUBE, T-TAG GASTROPEXY;  Surgeon: Zackery Montoya MD;  [facility]    REPLACE JEJUNOSTOMY TUBE, PERCUTANEOUS N/A 2021    Procedure: REPLACEMENT, JEJUNOSTOMY TUBE, PERCUTANEOUS;  Surgeon: Mandeep Park MD;  [facility]    REPLACE JEJUNOSTOMY TUBE, PERCUTANEOUS N/A 2023    Procedure: REPLACEMENT, JEJUNOSTOMY TUBE, PERCUTANEOUS;  Surgeon: Mandeep Park MD;  [facility]    REPLACE JEJUNOSTOMY TUBE, PERCUTANEOUS  2023    Procedure: Replace Jejunostomy Tube, Percutaneous;  Surgeon: Mandeep Park MD;  [facility]    transphenoidal pituitary resection  1990    [facility]  DELIVERY ONLY  1996    Z  DELIVERY ONLY  1998    repeat c section with incidental cystotomy with repair    Z EXCIS PITUITARY,TRANSNASAL/SEPTAL  1980    pituitary tumor removed for diabetes insipidus    [facility] TOTAL ABDOM HYSTERECTOMY  1999    w/  bilateral salpingoophorectomy        Family History:  New changes since last visit:  none  Family History   Problem Relation Age of Onset    Lipids Mother     Hypertension Mother     Thyroid Disease Mother     Depression Mother     Angina Mother     GERD Mother     Skin Cancer Mother     Migraines Mother     Autoimmune Disease Mother     Hyperlipidemia Mother     Mental Illness Mother     Cerebrovascular Disease Mother         11/2023    Other Cancer Mother         skin-basil cell    Anxiety Disorder Mother     Eye Disorder Father         cataract, detached retina    Myocardial Infarction Father 60    Lipids Father     Cerebrovascular Disease Father     Depression Father     Substance Abuse Father     Anesthesia Reaction Father         stroke right after surgery    Cataracts Father     Osteoarthritis Father     Ulcerative Colitis Father     Autoimmune Disease Father     Heart Disease Father     Hyperlipidemia Father     Mental Illness Father     Other Cancer Father         myloma    Anxiety Disorder Father     Interpersonal Violence Sister     Coronary Artery Disease Sister         angioplasty    GERD Sister     Substance Abuse Sister     Cerebrovascular Disease Sister         2005    Depression Sister     Thyroid Disease Sister     Eye Disorder Maternal Grandmother         cataract    Thyroid Disease Maternal Grandmother     Diabetes Maternal Grandfather     Eye Disorder Paternal Grandmother         cataract    Diabetes Paternal Grandmother     Eye Disorder Paternal Grandfather         cataract    Diabetes Paternal Grandfather     Substance Abuse Paternal Grandfather     Eye Disorder Son         ptosis    Depression Son     Anxiety Disorder Son     Heart Disease Paternal Aunt     Diabetes Paternal Aunt     Diabetes Paternal Uncle     Heart Disease Paternal Uncle     Depression Nephew     Anxiety Disorder Nephew     Thyroid Disease Nephew     Diabetes Type 2  Cousin         paternal cousin    Autoimmune Disease  Cousin     Autoimmune Disease Sister     Depression Sister     Mental Illness Sister     Substance Abuse Sister     Thyroid Disease Sister     Depression Son     Mental Illness Son     Anxiety Disorder Son     Thyroid Disease Nephew     Anxiety Disorder Nephew        Social History:  Social History     Social History Narrative     with 3 children and a dog.  No smoking, etoh or drug use.  Worked as a  for [employer] in [place] in the past.  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     Physical Exam:  Vitals: There were no vitals taken for this visit.  BMI= There is no height or weight on file to calculate BMI.  General:  Appearance is normal, no acute distress  HEENT:  NC/AT, sclera appear white  Neck:  No obvious thyromegaly  CV/Lungs:  Non distressed breathing  Skin:  No apparent rashes  Neuro:  Normal mental status  Psych:  Normal affect          Assessment:  1.  Post pancreatectomy diabetes mellitus, s/p total pancreatectomy and islet autotransplant.  (ICD-10 E89.1)  2.  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   Gastroparesis  4.  Nausea, vomiting      Dinora is a 58 year old with history of chronic pancreatitis who is s/p total pancreatectomy and islet autotransplant.  She has been insulin independent with A1c in goal range in the past, but more recently with struggles around gastroparesis, and need for TPN and tube feeds she is back on low doses of basal insulin. Needs CGM because of her hypoglycemia hx.    She has     Plan:  1.  Changes to current diabetes regimen:  Patient Instructions   Keep the same glargine dose.  Let's try adjusting the timing to start at time that the TPN is started.     2.  Frequency of blood sugar checks:  Keep  wearing Yandy-- this is much better for Dinora than fingersticks because with fingersticks we miss the really critical data of the post prandial excursions.    3.  Continue routine follow up for autoislet transplant patients:  Mixed meal test (6 mL/kg BoostHP to max of 360 mL) at 3 months, 6 months, and once a year post transplant.  Hemoglobin A1c levels at these time points and quarterly.    4.  Other issues addressed today:  none    Follow up:  4 mos    Contact me for questions at [phone] or [phone].  Emergency number to reach pediatric endocrinology after hours is [phone].    Dr. Gloria Melissa MD  [facility] [place]  Director of Research, Islet Auto-transplant Program  Phone:  [phone]  Electronically signed: December 3, 2023 at 9:27 PM          Review of the result(s) of each unique test - HbA1c, yandy  40 minutes spent on the date of the encounter doing chart review, history and exam, documentation, downloading and reviewing CGM data,  and further activities per the note

## 2023-12-04 NOTE — PATIENT INSTRUCTIONS
Keep the same glargine dose.  Let's try adjusting the timing to start at time that the TPN is started.

## 2023-12-04 NOTE — PROGRESS NOTES
"PROCEDURE NOTE:     [facility]  Botulinum Toxin Procedure     Headache Neurology     September 25, 2023     Procedure:  OnabotulinumtoxinA injections for chronic migraine  Indication:  Chronic migraine     Dinora suffers from severe intractable headaches.  She was referred by for onabotulinumtoxinA injections for headache.  Risks, benefits, and alternatives were discussed.  All questions were answered and consent given.  She decided to proceed with the injections.      Headache history, from initial consult note: See Initial Headache Clinic visit on 8/3/2022 for details     Prior to initiation of botulinum toxin injections, Ms. Mcghee reported 13-15 headache days per month, with 3-4 severe headache days per month. Her headaches are quite disabling and often interfere with her ability to function normally.     Date of last injections: 6/19/2023     Ms. Mcghee reports 1 headache day every 10 days per month currently and more headaches in the 3 weeks leading to Botox -11 headache days in 21 day, with none in the 10 weeks but 6 really severe headache days in the past 3 weeks .  She has noticed a wearing off phenomenon prior to this round of botulinum toxin injections, lasting 3 weeks.     Botulinum toxin injections have improved their functioning: able to read better, walking and not in bed      She has attempted other migraine prophylactic treatments in the past, which have included: Amitriptyline for 15 years and stopped   Topiramate  Sumatriptan   lyrica-\"brain fog\"  depakote -allergies  Compazine +benedryl  Promethazine IV three times per week for   scopalamine patch     She currently takes amitriptyline, promethazine, zolmitriptine for headache prevention.     Ms. Skinners pain was assessed prior to the procedure.  She rated her pain today as 6 out of 10.     Procedural Pause: Procedural pause was conducted to verify correct patient identity, procedure to be performed, correct side and site, correct patient " position, and special requirements. Appropriate hand hygiene was utilized, and each injection site was prepped with alcohol wipe or Chloraprep swab.      Procedure Details: 200 units of onabotulinumtoxinA was diluted in 4 mL 0.9% normal saline. A total of 155 units of onabotulinumtoxinA were injected using 30 gauge 0.5 in needles into the muscles listed below. 45 units of onabotulinumtoxinA were wasted.         GOAL OF PROCEDURE:  The goal of this procedure is to decrease pain and enhance functional independence.     CONSENT:  The risks, benefits, and treatment options were discussed with the patient who agreed to proceed.     Written consent was obtained      EQUIPMENT USED:  Needles-30 gauge, 0.5 inches for injections  Four 1-ml tuberculin syringes for injections  One sodium chloride 10 ml vial preservative free  Alcohol swabs     SKIN PREPARATION:  Skin preparation was performed using an alcohol wipe.        AREA/MUSCLE INJECTED:  155 units of Botox  Right upper Trapezius (upper cervical) - 5 units of Botox at 3 site/s.   Left upper Trapezius (upper cervical) - 5 units of Botox at 3 site/s.      Right cervical paraspinals - 5 units of Botox at 2 site/s.   Left cervical paraspinals - 5 units of Botox at 2 site/s.      Left occipitalis - 5 units of Botox at 3 site/s.  Right occipitalis -5 units of Botox at 3 site/s     Right Frontalis - 5 units of Botox at 2 site/s.  Left Frontalis - 5 units of Botox at 2 site/s.     Right Temporalis - 5 units of Botox at 4 site/s.  Left Temporalis - 5 units of Botox at 4 site/s.     Right  - 5 units of Botox at 1 site/s.              Left  - 5 units of Botox at 1 site/s.     Procerus - 5 units of Botox at 1 site/s.     RESPONSE TO PROCEDURE:  tolerated the procedure well and there were no immediate complications.  Patient was allowed to recover for an appropriate period of time and was discharged home in stable condition.     FOLLOW UP:     Repeat Botox  injections in 12 weeks        This procedure was performed under a hospital privileging agreement with NATALY Bonner, [facility]

## 2023-12-05 ENCOUNTER — PATIENT OUTREACH (OUTPATIENT)
Dept: INTERNAL MEDICINE | Facility: CLINIC | Age: 58
End: 2023-12-05
Payer: COMMERCIAL

## 2023-12-05 ENCOUNTER — PATIENT OUTREACH (OUTPATIENT)
Dept: CARE COORDINATION | Facility: CLINIC | Age: 58
End: 2023-12-05

## 2023-12-05 DIAGNOSIS — G43.109 MIGRAINE WITH AURA AND WITHOUT STATUS MIGRAINOSUS, NOT INTRACTABLE: Primary | ICD-10-CM

## 2023-12-05 ASSESSMENT — ACTIVITIES OF DAILY LIVING (ADL): DEPENDENT_IADLS:: INDEPENDENT

## 2023-12-05 NOTE — PROGRESS NOTES
"Clinic Care Coordination Contact  Clinic Care Coordination Contact  OUTREACH    Care Team:   Care Team    Gastroenterology: Dr. Park and Dr. Birgit Victor, RN CC    Neurology: NATALY Andrew CNP    Endocrinology: Dr. Shin Jolly, RN CC     Pain Clinic: [facility]  [place]    Primary Care: Dr. Patricia Medina, RN CC    Referral Information:  Referral Source: PCP    Referred by Inpatient RN for Care Transition related to HC Discharge and Inpatient to Outpatient on 11/15/23.      Primary Diagnosis: GI Disorders (Transplant)    Chief Complaint   Patient presents with    Clinic Care Coordination - Initial        Universal Utilization:   Health Maintenance Due   Topic Date Due    NICOTINE/TOBACCO CESSATION COUNSELING Q 1 YR  Never done    DIABETIC FOOT EXAM  Never done    URINE DRUG SCREEN  Never done    DEPRESSION ACTION PLAN  Never done    MIGRAINE ACTION PLAN  Never done    EYE EXAM  02/19/2020    HEPATITIS B IMMUNIZATION (2 of 3 - 19+ 3-dose series) 10/16/2023     Clinic Utilization  Difficulty keeping appointments:: No  Compliance Concerns: No  No-Show Concerns: No  No PCP office visit in Past Year: No  Utilization      No Show Count (past year)  1             ED Visits  4             Hospital Admissions  10                    Current as of: 12/5/2023 11:51 AM                Clinical Concerns:  Current Medical Concerns:  Patient reports that things are going a little better today, pt just came off of a 3 day migraine. Patient experiences pain and nausea with migraines, which is part of the \"daily hsu\". Just got Botox on Monday with Neurology which has been effective. Patient reports that it is only authorized every 12 weeks, around 10-11 weeks she can usually tell that she is getting more migraines (cyclical). Unfortunately, when she has migraines and nausea, they are usually only bile and retching as there is not much food in her stomach.     Patient is going to " be meeting with her Neurologist in February for wanting to try a new drug. They work together pretty well which she is thankful for. Botox is q 12 weeks, they meet to discuss care plan annually.     She states that Dr. Gomez mainly manages her Thyroid concerns, as she has had higher than normal thyroid functioning at her last follow up. She had a high functioning thyroid after her third child, who is now 25. Dr. Gomez also helps her manage her vaccinations and other health management needs and serves as the hub for her healthcare. 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 She ends up having to find windows of time and plan for this in advance. She does know that she needs her 2nd Hepatitis B vaccine and will work and getting this at a local pharmacy.     Patient reports that Dr. Gomez recommends a colonoscopy every year, and she is wondering if this is something that could be managed by Dr. Park with GI. RN did encourage that this could be discussed with Dr. Park and mentioned that this would be shared with Alonzo. She also is concerned about her liver labs being elevated (AST and ALT). She states pharmacy is helping with this to adjust her TPN, and is wondering who would manage. RN encouraged that Dr. Park could also help with this.     She sees GI for her tube feeding, TPN, as well as follow up on a colonoscopy that had several pre-cancerous polyps removed and working to navigate that.     She is seeing [place], a PA at [facility] in [place]- though Dr. Gomez is managing her Syndros as this is something Milan would not be able to prescribe.     She sees Dr. Melissa for Endocrinology and Transplant for her pancreas, and states that she talks with her frequently.     Patient stated she is also working with Hattie from Pharmacy in regards to getting medications in alternate formats if possible (liquids forms) which  "compounding is expensive and she is worried about this for January upcoming due to insurance restarting.     Patient also shares that she is having concerns with her sleep. She sleeps terrible for all of the medication that she is on. She tried melatonin, which did not work. She states that she has anxiety when she is nauseous and she is not able to sleep. RN encouraged patient to address this at upcoming appt with Neurology.     Current Behavioral Concerns: Patient describes that she was previously a hospital  for [employer]. 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 Patient states they traveled 4x per year, she was a triathlete,     Pt reports doing well at home on her own. Her  lives with her, and he comes home every day at noon to check on her. He works as a - lots of flexibility. Transportation is complex as their medical care is an hour away (live in [place]). Patient has a group of 3 people (2 friends and her ) who can provide medical transport for patient. Her children are in the [facility] and [place] (daughter is SW at [employer]). Her daughter comes to help her clean her home 1x per month as they have 2 dogs. She has friends that come to knit with her a couple of times a month. She states she has really fine tuned the list of people who can be a supportive relationship for her in her life and she is very happy with where things are at.     Most recently when she was hospitalized, her mother had a stroke. Her mom and dad live in town where she does and she is her parent's \"go to person\" as she is the oldest. Her mom's communication center has crashed, and this has caused a lot of stress for the patient to complete tasks as home as her dad was not at all " "making bill payments, decisions, etc. Pt's younger sister has agreed to help make some decisions and is coming to visit this Friday to help get her mom organized. She is more savvy than her sister in medical realm (due to  background) so she will remain available to help still. While she was also hospitalized, her son's mentor went missing and had found to be dead by suicide. The family has spent time together grieving this over the last week and pt believes her son will be around more to be involved in her care as he was able to see how \"bad\" things could get.     Pt's niece is getting  in [place] on January 6th, and they have tickets to go to this trip. She is trying to figure out how this will go with her TPN if she will still be on it, etc. Patient is wondering if a letter can be written for her for traveling to get through [facility] that includes her diagnosis and medication list with TPN- she states she had done this before with IV fluids and it worked well (with previous transplant coordinator, Lidya). RN informed that would work with lis Rosado's current transplant coordinator on this.       Pt also does see a therapist (Pt is meeting on 12/7 and 12/14 with Sweta OVALLE) and reports having upcoming appt with her as well.   Education Provided to patient: see as above    Pain  Pain (GOAL):: Yes  Type: Chronic (>3mo)  Location of chronic pain:: abdominal, migraine  Progression: Unchanged  Description of pain: Aching, Throbbing, Sharp  Limitation of routine activities due to chronic pain:: Yes  Description: Able to do light housework (depends on severity of pain which varies)  Alleviating Factors: Rest, Ice, Heat, Other, Pain Medication (meditation)  Aggravating Factors: Light  Health Maintenance Reviewed: Due/Overdue   Clinical Pathway: None    Medication Management:  Medication review status: medications not reviewed during this outreach.     Functional Status:  Dependent ADLs:: " Independent  Dependent IADLs:: Independent ( helps as well as patient's daughter with cleaning 1x per month.)  Bed or wheelchair confined:: No  Mobility Status: Independent  Fallen 2 or more times in the past year?: No  Any fall with injury in the past year?: No    Living Situation:  Current living arrangement:: I live in a private home  Type of residence:: Private home - stairs    Lifestyle & Psychosocial Needs:    Social Determinants of Health     Food Insecurity: Low Risk  (11/26/2023)    Food Insecurity     Within the past 12 months, did you worry that your food would run out before you got money to buy more?: No     Within the past 12 months, did the food you bought just not last and you didn t have money to get more?: No   Depression: At risk (11/29/2023)    PHQ-2     PHQ-2 Score: 3   Housing Stability: Low Risk  (11/26/2023)    Housing Stability     Do you have housing? : Yes     Are you worried about losing your housing?: No   Tobacco Use: Medium Risk (11/29/2023)    Patient History     Smoking Tobacco Use: Former     Smokeless Tobacco Use: Unknown     Passive Exposure: Not on file   Financial Resource Strain: Low Risk  (11/26/2023)    Financial Resource Strain     Within the past 12 months, have you or your family members you live with been unable to get utilities (heat, electricity) when it was really needed?: No   Alcohol Use: Not on file   Transportation Needs: Low Risk  (11/26/2023)    Transportation Needs     Within the past 12 months, has lack of transportation kept you from medical appointments, getting your medicines, non-medical meetings or appointments, work, or from getting things that you need?: No   Physical Activity: Not on file   Interpersonal Safety: Not on file   Stress: Not on file   Social Connections: Not on file     Diet:: Other (TPN)  Inadequate nutrition (GOAL):: Yes  Tube Feeding: Yes  Tube Feeding: J-tube  Inadequate activity/exercise (GOAL):: No  Significant changes in sleep  pattern (GOAL): Yes  Transportation means:: Regular car, Family, Friend     Gnosticist or spiritual beliefs that impact treatment:: No  Mental health DX:: Yes  Mental health DX how managed:: Outpatient Counseling  Mental health management concern (GOAL):: No  Chemical Dependency Status: No Current Concerns  Informal Support system:: Children, Family, Parent, Spouse          Resources and Interventions     Community Resources: Home Infusion  Supplies Currently Used at Home: Enteral Nutrition & Supplies  Equipment Currently Used at Home: other (see comments) (TF supplies (J tube))  Employment Status: disabled         Advance Care Plan/Directive  Advanced Care Plans/Directives on file:: Yes  Status of record:: On File and Validated  Discussed with patient/caregiver:: Declined Further Information    Referrals Placed: None       Care Plan:  Care Plan: Health Maintenance       Problem: Health Maintenance Due or Overdue       Long-Range Goal: Become up-to-date with health maintenance visit(s)       Start Date: 12/5/2023    This Visit's Progress: On track    Priority: High    Note:     Barriers: patient has complex illness and medical care, collaborates with various specialty teams  Strengths: patient has a great relationship with providers and members of care team, keeps healthcare management organized  Patient expressed understanding of goal: yes  Action steps to achieve this goal:  1. I will attend all of my upcoming appointments with my specialist and primary care provider.   2. I will communicate with my RN CC if there are further needs I have for assistance and support with primary care.   3. I will work with my RN CC on identifying all of the goals my providers have for helping me reach my health care needs.     RN goals:  RN to look at pt's care plans from previous visit notes with providers. RN to help patient identify the items that have yet to be completed and stay on top of health care goals.   RN to collaborate  with Conchis RN CC for transplant and GI.                                   Patient/Caregiver understanding: Patient states understanding. Patient has no further questions or concerns regarding above assessment. Patient is aware to call the clinic and reach out to the care team with any further questions.       Outreach Frequency: monthly, more frequently as needed  Future Appointments                In 2 days Sweta Ghosh LIC[facility] [place]    In 1 week Sweta Ghosh [facility] [place]    In 1 week Mandeep Park MD [facility]    In 1 month [facility] Internal [facility]    In 2 months Rachelle العلي APRN CNP [facility]    In 2 months Rachelle العلي APRN CNP [facility]    In 3 months Vijaya Genao MD [facility]    In 3 months Vijaya Genao MD [facility]    In 3 months Juan Jose Gomez MD [facility] Internal [facility]    In 5 months Rachelle العلي APRN CNP [facility] Neu[facility]            Plan: RN to identify the things that patient needs to complete and needs assistance with for next month's telephone call.     For January:   -60 day supply of Dronabinol will be available to get from  specialty pharmacy (will last until next follow up appt in March)  -patient to discuss plan with Dr. Park for long term TPN plan  -if patient is to receive TPN long term, pt will need an IR consult to evaluate long term PICC as NO port   -second Hep B vaccine for health maintenance    -discuss abnormal liver labs with Dr. Park     For February:     For March:   - pt to have follow up with Dr. Gomez and re-check A1c  -follow up on Dronabinol with Dr. Gomez     RN scheduled follow up call with patient on Jan 16th. Pt to collaborate with transplant CC and Dr. Park's RN CC for travel plans for patient with medications.     DARIELA ADAMS RN on 12/5/2023 at 12:34 PM

## 2023-12-06 ENCOUNTER — TELEPHONE (OUTPATIENT)
Dept: NEUROLOGY | Facility: CLINIC | Age: 58
End: 2023-12-06
Payer: COMMERCIAL

## 2023-12-06 NOTE — TELEPHONE ENCOUNTER
Dinora's food and insulin logs for 1/26/17         Nurse placed pt on cont pulse ox, pt o2 noted to be 87% and sustaining there. Nurse asked opt to take deep breath and cough to clear congestion. Pt did and o2 went up to 92% but then dropped back down to 87-86%. Pt was placed on 2lpm via NC. Rhonchi noted, chest xray placed. Provider updated.       Tena Murdock, [place]  12/06/23 [id number]

## 2023-12-06 NOTE — TELEPHONE ENCOUNTER
"Prior Authorization Retail Medication Request    Medication/Dose: rimegepant (NURTEC) 75 MG ODT tablet  Diagnosis and ICD code (if different than what is on RX):  [G43.109]   New/renewal/insurance change PA/secondary ins. PA:  Previously Tried and Failed:    Amitriptyline for 15 years and stopped   Topiramate  Sumatriptan   lyrica-\"brain fog\"  depakote -allergies  Compazine +benedryl  Promethazine IV three times per week for   scopalamine patch    Rationale:  migraine    Insurance   Primary: [facility]  Insurance ID:  [id number]     Pharmacy Information (if different than what is on RX)  Name:    Phone:    Fax:    "

## 2023-12-07 ENCOUNTER — VIRTUAL VISIT (OUTPATIENT)
Dept: ONCOLOGY | Facility: CLINIC | Age: 58
End: 2023-12-07
Attending: SOCIAL WORKER
Payer: COMMERCIAL

## 2023-12-07 VITALS — WEIGHT: 125 LBS | HEIGHT: 68 IN | BODY MASS INDEX: 18.94 KG/M2

## 2023-12-07 DIAGNOSIS — F43.23 ADJUSTMENT DISORDER WITH MIXED ANXIETY AND DEPRESSED MOOD: Primary | ICD-10-CM

## 2023-12-07 DIAGNOSIS — Z51.5 PALLIATIVE CARE PATIENT: ICD-10-CM

## 2023-12-07 PROCEDURE — 90834 PSYTX W PT 45 MINUTES: CPT | Mod: VID | Performed by: SOCIAL WORKER

## 2023-12-07 ASSESSMENT — PAIN SCALES - GENERAL: PAINLEVEL: MODERATE PAIN (4)

## 2023-12-07 ASSESSMENT — PATIENT HEALTH QUESTIONNAIRE - PHQ9: SUM OF ALL RESPONSES TO PHQ QUESTIONS 1-9: 18

## 2023-12-07 NOTE — LETTER
12/7/2023         RE: Dinora Mcghee  [address] [place] [place] [address]        Dear Colleague,    Thank you for referring your patient, Dinora Mcghee, to the [facility] [place]. Please see a copy of my visit note below.    Virtual Visit Details    Type of service:  Video Visit     Originating Location (pt. Location): Home    Distant Location (provider location):  On-site  Platform used for Video Visit: [employer]    Virtual Visit Details    Type of service:  Video Visit     Originating Location (pt. Location): Home    Distant Location (provider location):  On-site  Platform used for Video Visit: [facility]        Palliative Care Clinical Social Work Return Appointment    PLEASE NOTE:  THIS IS A MENTAL HEALTH NOTE.  OTHER PROVIDERS VIEWING THIS NOTE SHOULD USE THIS ONLY FOR UNDERSTANDING THE CONTEXT OF THE PATIENT'S EXPERIENCE.  TOPICS DESCRIBED IN THIS NOTE SHOULD NOT BE REFERENCED TO THE PATIENT BY MEDICAL PROVIDERS.    Dinora Mcghee is a 58  year old woman with multiple medical conditions including chronic pancreatitis s/p TPAIT, long term complications from gastroparesis, constipation, GERD, migraines.  Had GJ placed Sept 2021, recovery was complicated and she ended up with a venting g-tube.  Due to complications with PO tolerance she had surgical placement of a J tube, and a resection of densely adhered small bowel.  Started on tube feedings, then on TPN.  TPN eventually stopped due to infection.  Has heavy symptom burden that includes chronic nausea and vomiting, fatigue, complex medical management. Seen today to address adjustment disorder with depressed mood and anxiety.     Was discharged from hospital day before our visit.     Mental Status Exam:(List all that apply)      Appearance: Appropriate      Eye Contact: Good       Orientation: Yes, x4      Mood: Depressed grieving       Affect: Appropriate -- tearful       Thought Content: Clear         Thought Form: Logical      Psychomotor  "Behavior: Normal    Visit themes: Anxiety, trauma, stress     Adjustment to Illness:  Remains home from hospital.  Is managing TPN and tube feeds.  HAd a good day yesterday, went for a walk with a friend. Tired today but also hoping to meet up with another friend after our session for a walk.  Going in tomorrow to have her tube exchanged.      Mental Health (thoughts, feelings, actions, coping, and symptoms):     Since we last met a close friend  by suicide.  This has been deeply sad for Dinora and her community but she has also found a sense of meaning in being able to provide support to her friends  who is also a close friend and to her children who were close with him and with his children. She was able to go to the  and see many people she has not seen in some time.     Had a priscila thanksgiving with her family.     Mood better today \"I feel like I\"m dipping my toes in the pool.  I can't swim, but I can laugh at the jokes and do what I can\"     Describes connecting with a Sense of value and purpose.      Helpful activities: time with family.  Meditating, knitting gifts for family members and friends, tapping.  Uses EMDR techniques. Going for a walk with a friend later today         Helpful cognitions:     Unhelpful and/or triggering or exacerbating factors:     Body-Mind Skills Education: uses tapping and EMDR     Relationships:  and children, extended family    End of Life and Advance Care Planning:     Main therapeutic interventions provided this session include:     Provide psychoeducation and Facilitate processing of thoughts and feelings    Plan:  Will return for psychotherapy with palliative care focus in 1 week.  Dinora has asked that we continue with the open format that we have been using rather than the structured format of Meaning Centered Psychotherapy.      SHe asked that next week we spend time focusing on what to tell people when they ask about hte state of her health, or " "comment that she \"looks great\"     Time spent with patient/family:   44  (Start 1:30 end 2:16)    JAIDA Nguyen, [facility]   Palliative Care Clinical   Ph: [phone]      DO NOT SEND ANY LETTERS                Again, thank you for allowing me to participate in the care of your patient.        Sincerely,        VASQUEZ Nguyen  "

## 2023-12-07 NOTE — NURSING NOTE
Is the patient currently in the state of [place]? YES    Visit mode:VIDEO    If the visit is dropped, the patient can be reconnected by: VIDEO VISIT: Text to cell phone:   Telephone Information:   Mobile [phone]       Will anyone else be joining the visit? NO  (If patient encounters technical issues they should call [phone]    How would you like to obtain your AVS? MyChart    Are changes needed to the allergy or medication list? No    Reason for visit: RECHECK    Isacc FLETCHER

## 2023-12-07 NOTE — LETTER
12/7/2023         RE: Dinora Mcghee  [address] [place] [address]        Dear Colleague,    Thank you for referring your patient, Dinora Mcghee, to the [facility]. Please see a copy of my visit note below.    Virtual Visit Details    Type of service:  Video Visit     Originating Location (pt. Location): Home    Distant Location (provider location):  On-site  Platform used for Video Visit: [employer]    Virtual Visit Details    Type of service:  Video Visit     Originating Location (pt. Location): Home    Distant Location (provider location):  On-site  Platform used for Video Visit: [facility]        Palliative Care Clinical Social Work Return Appointment    PLEASE NOTE:  THIS IS A MENTAL HEALTH NOTE.  OTHER PROVIDERS VIEWING THIS NOTE SHOULD USE THIS ONLY FOR UNDERSTANDING THE CONTEXT OF THE PATIENT'S EXPERIENCE.  TOPICS DESCRIBED IN THIS NOTE SHOULD NOT BE REFERENCED TO THE PATIENT BY MEDICAL PROVIDERS.    Dinora Mcghee is a 58  year old woman with multiple medical conditions including chronic pancreatitis s/p TPAIT, long term complications from gastroparesis, constipation, GERD, migraines.  Had GJ placed Sept 2021, recovery was complicated and she ended up with a venting g-tube.  Due to complications with PO tolerance she had surgical placement of a J tube, and a resection of densely adhered small bowel.  Started on tube feedings, then on TPN.  TPN eventually stopped due to infection.  Has heavy symptom burden that includes chronic nausea and vomiting, fatigue, complex medical management. Seen today to address adjustment disorder with depressed mood and anxiety.     Was discharged from hospital day before our visit.     Mental Status Exam:(List all that apply)      Appearance: Appropriate      Eye Contact: Good       Orientation: Yes, x4      Mood: Depressed grieving       Affect: Appropriate -- tearful       Thought Content: Clear         Thought Form: Logical      Psychomotor  "Behavior: Normal    Visit themes: Anxiety, trauma, stress     Adjustment to Illness:  Remains home from hospital.  Is managing TPN and tube feeds.  HAd a good day yesterday, went for a walk with a friend. Tired today but also hoping to meet up with another friend after our session for a walk.  Going in tomorrow to have her tube exchanged.      Mental Health (thoughts, feelings, actions, coping, and symptoms):     Since we last met a close friend  by suicide.  This has been deeply sad for Dinora and her community but she has also found a sense of meaning in being able to provide support to her friends  who is also a close friend and to her children who were close with him and with his children. She was able to go to the  and see many people she has not seen in some time.     Had a priscila thanksgiving with her family.     Mood better today \"I feel like I\"m dipping my toes in the pool.  I can't swim, but I can laugh at the jokes and do what I can\"     Describes connecting with a Sense of value and purpose.      Helpful activities: time with family.  Meditating, knitting gifts for family members and friends, tapping.  Uses EMDR techniques. Going for a walk with a friend later today         Helpful cognitions:     Unhelpful and/or triggering or exacerbating factors:     Body-Mind Skills Education: uses tapping and EMDR     Relationships:  and children, extended family    End of Life and Advance Care Planning:     Main therapeutic interventions provided this session include:     Provide psychoeducation and Facilitate processing of thoughts and feelings    Plan:  Will return for psychotherapy with palliative care focus in 1 week.  Dinora has asked that we continue with the open format that we have been using rather than the structured format of Meaning Centered Psychotherapy.      SHe asked that next week we spend time focusing on what to tell people when they ask about hte state of her health, or " "comment that she \"looks great\"     Time spent with patient/family:   44  (Start 1:30 end 2:16)    JAIDA Nguyen, [facility]   Palliative Care Clinical   Ph: [phone]      DO NOT SEND ANY LETTERS                Again, thank you for allowing me to participate in the care of your patient.        Sincerely,        VASQUEZ Nguyen  "

## 2023-12-07 NOTE — PROGRESS NOTES
Virtual Visit Details    Type of service:  Video Visit     Originating Location (pt. Location): Home    Distant Location (provider location):  On-site  Platform used for Video Visit: [employer]    Virtual Visit Details    Type of service:  Video Visit     Originating Location (pt. Location): Home    Distant Location (provider location):  On-site  Platform used for Video Visit: Well        Palliative Care Clinical Social Work Return Appointment    PLEASE NOTE:  THIS IS A MENTAL HEALTH NOTE.  OTHER PROVIDERS VIEWING THIS NOTE SHOULD USE THIS ONLY FOR UNDERSTANDING THE CONTEXT OF THE PATIENT'S EXPERIENCE.  TOPICS DESCRIBED IN THIS NOTE SHOULD NOT BE REFERENCED TO THE PATIENT BY MEDICAL PROVIDERS.    Dinora Mcghee is a 58  year old woman with multiple medical conditions including chronic pancreatitis s/p TPAIT, long term complications from gastroparesis, constipation, GERD, migraines.  Had GJ placed Sept 2021, recovery was complicated and she ended up with a venting g-tube.  Due to complications with PO tolerance she had surgical placement of a J tube, and a resection of densely adhered small bowel.  Started on tube feedings, then on TPN.  TPN eventually stopped due to infection.  Has heavy symptom burden that includes chronic nausea and vomiting, fatigue, complex medical management. Seen today to address adjustment disorder with depressed mood and anxiety.     Was discharged from hospital day before our visit.     Mental Status Exam:(List all that apply)      Appearance: Appropriate      Eye Contact: Good       Orientation: Yes, x4      Mood: Depressed grieving       Affect: Appropriate -- tearful       Thought Content: Clear         Thought Form: Logical      Psychomotor Behavior: Normal    Visit themes: Anxiety, trauma, stress     Adjustment to Illness:  Remains home from hospital.  Is managing TPN and tube feeds.  HAd a good day yesterday, went for a walk with a friend. Tired today but also hoping to meet up with  "another friend after our session for a walk.  Going in tomorrow to have her tube exchanged.      Mental Health (thoughts, feelings, actions, coping, and symptoms):     Since we last met a close friend  by suicide.  This has been deeply sad for Dinora and her community but she has also found a sense of meaning in being able to provide support to her friends  who is also a close friend and to her children who were close with him and with his children. She was able to go to the  and see many people she has not seen in some time.     Had a priscila thanksgiving with her family.     Mood better today \"I feel like I\"m dipping my toes in the pool.  I can't swim, but I can laugh at the jokes and do what I can\"     Describes connecting with a Sense of value and purpose.      Helpful activities: time with family.  Meditating, knitting gifts for family members and friends, tapping.  Uses EMDR techniques. Going for a walk with a friend later today         Helpful cognitions:     Unhelpful and/or triggering or exacerbating factors:     Body-Mind Skills Education: uses tapping and EMDR     Relationships:  and children, extended family    End of Life and Advance Care Planning:     Main therapeutic interventions provided this session include:     Provide psychoeducation and Facilitate processing of thoughts and feelings    Plan:  Will return for psychotherapy with palliative care focus in 1 week.  Dinoar has asked that we continue with the open format that we have been using rather than the structured format of Meaning Centered Psychotherapy.      SHe asked that next week we spend time focusing on what to tell people when they ask about hte state of her health, or comment that she \"looks great\"     Time spent with patient/family:   44  (Start 1:30 end 2:16)    JAIDA Nguyen, [facility]   Palliative Care Clinical   Ph: [phone]      DO NOT SEND ANY LETTERS              "

## 2023-12-08 ENCOUNTER — HOSPITAL ENCOUNTER (OUTPATIENT)
Facility: CLINIC | Age: 58
Discharge: HOME | End: 2023-12-08
Attending: INTERNAL MEDICINE | Admitting: INTERNAL MEDICINE
Payer: COMMERCIAL

## 2023-12-08 ENCOUNTER — APPOINTMENT (OUTPATIENT)
Dept: GENERAL RADIOLOGY | Facility: CLINIC | Age: 58
End: 2023-12-08
Attending: INTERNAL MEDICINE
Payer: COMMERCIAL

## 2023-12-08 VITALS
RESPIRATION RATE: 16 BRPM | OXYGEN SATURATION: 100 % | HEART RATE: 71 BPM | DIASTOLIC BLOOD PRESSURE: 54 MMHG | SYSTOLIC BLOOD PRESSURE: 79 MMHG

## 2023-12-08 LAB
GLUCOSE BLDC GLUCOMTR-MCNC: 108 MG/DL (ref 70–99)
GLUCOSE BLDC GLUCOMTR-MCNC: 64 MG/DL (ref 70–99)
PROVATION GI EXAM: NORMAL

## 2023-12-08 PROCEDURE — 49451 REPLACE DUOD/JEJ TUBE PERC: CPT | Performed by: INTERNAL MEDICINE

## 2023-12-08 PROCEDURE — 76000 FLUOROSCOPY <1 HR PHYS/QHP: CPT | Mod: TC

## 2023-12-08 PROCEDURE — 250N000011 HC RX IP 250 OP 636: Performed by: INTERNAL MEDICINE

## 2023-12-08 PROCEDURE — G0500 MOD SEDAT ENDO SERVICE >5YRS: HCPCS | Performed by: INTERNAL MEDICINE

## 2023-12-08 PROCEDURE — 82962 GLUCOSE BLOOD TEST: CPT

## 2023-12-08 RX ORDER — FENTANYL CITRATE 50 UG/ML
INJECTION, SOLUTION INTRAMUSCULAR; INTRAVENOUS PRN
Status: DISCONTINUED | OUTPATIENT
Start: 2023-12-08 | End: 2023-12-08 | Stop reason: HOSPADM

## 2023-12-08 ASSESSMENT — ACTIVITIES OF DAILY LIVING (ADL): ADLS_ACUITY_SCORE: 37

## 2023-12-08 NOTE — TELEPHONE ENCOUNTER
Central Prior Authorization Team - Phone: [phone]     PA Initiation    Medication: NURTEC 75 MG PO TBDP  Insurance Company: [employer] [place] - Phone [phone] Fax [phone]  Pharmacy Filling the Rx: [id number] [place], [place] - [address]  Filling Pharmacy Phone: [phone]  Filling Pharmacy Fax:    Start Date: 12/8/2023

## 2023-12-08 NOTE — DISCHARGE INSTRUCTIONS
What is a Bronchoscopy?   A Bronchoscopy is a test that allows your doctor to look at the large airways of your lungs. The doctor will pass a flexible tube with a camera (bronchoscope) through your nose or mouth and into your lungs.    The test itself takes 30 to 60 minutes, but since medication is given to ensure your comfort, your visit will last 3 to 4 hours.    What is an Endobronchial Ultrasound (EBUS)?  Endobronchial Ultrasound (EBUS) is a type of bronchoscopy.  During an EBUS, the lungs and the space between the lungs (mediastinum) are looked at using a flexible bronchoscope and ultrasound (images created using sounds waves).  Ultrasound guides the doctor, allowing them to see through the airway walls.   For this test a tube is temporarily placed into your throat.  This allows the doctor to easily insert and remove the scope to obtain several samples.  You will continue to breath on your own during this procedure.  The test will take 45-60 minutes, however since medications are given to ensure your comfort, you should expect your visit to last 3-4 hours.    Preparation  To ensure an accurate and safe test, follow all instructions given by your doctor, including:  Do not eat or drink anything for six (6) hours before the test. Unless your doctor gives you different instructions, take your medications as you normally do with just a sip of water.   You must have someone drive you home. We recommend that someone stay with you for 24 hours.  Wear comfortable clothes.  Bring a list of your current medications, including over-the-counter drugs, aspirin, vitamins, or herbal supplements.  Tell your doctor if you take any blood thinners and follow instructions to hold medications if applicable.    What Happens During the Test?  Your IV (intravenous) line will be used to administer medications that will make you drowsy and ensure your comfort during the procedure. You may not remember portions of the test due to this  medication. A nurse will monitor your blood pressure, heart rate, and breathing during the test.     Medication will be used to numb your throat and help suppress your cough and gag reflex. You will receive oxygen. You should not feel pain, but may experience mild discomfort as the scope is inserted. This will not interfere with your breathing. Every effort will be made to minimize discomfort.    The doctor may take samples (biopsies) of lung tissue or fluid through the bronchoscope. The samples will be sent for laboratory evaluation. It typically takes a minimum of 48 to 72 hours to receive results from samples sent.    When the Test is Complete  The medications you receive will make you drowsy. For this reason, you will be monitored for at least 2 hours after the procedure is done. You are not allowed to drive for the rest of the day.    You will receive something to drink and eat 2 hours after the test is done, or when the numbing medication wears off. You may experience a sore throat after the test for 24 hours.    After the Procedure  Rest the remainder of the day. Too much activity may cause nausea and dizziness.  If you are discharged, do NOT drive or operate heavy machinery.  You should avoid hot foods and liquids for 6 hours following the test. After that, you may follow your normal diet. Do not drink alcoholic beverages for 24 hours.  Speak to your nurse, primary doctor or the ordering physician if you have any concerns, including:  Shortness of breath or difficulty breathing.  Fever over 101 degrees  for more than 24 hours (a mild, short-term fever may be normal).  Unusual bleeding (more than 2 tablespoons of blood at one time) should be reported to your nurse or doctor. It is normal to cough up a small amount of blood for 24 hours.  Seek immediate medical assistance for severe chest pain. Mild soreness in the chest may be normal.    Here at [facility], we are dedicated to providing the best  possible care. We hope to make your experience as pleasant as possible. Thank you for taking time to read this information. Feel free to ask questions if something is not clear to you.    Home Tube Feeding: Care Instructions  Overview  Tube feeding is a way of providing nutrition and fluids through a tube into the stomach or intestines. The tube may be inserted through the skin and into the stomach during surgery, or it may go through the mouth or nose, down the throat, and then into the stomach. Tube feeding can nourish people who have a short illness that makes swallowing difficult or people who have a severe illness, and it may prolong life.  Follow-up care is a key part of your treatment and safety. Be sure to make and go to all appointments, and call your doctor if you are having problems. It's also a good idea to know your test results and keep a list of the medicines you take.  How can you care for yourself at home?  Follow your doctor's instructions for use and care of the feeding tube. Your doctor will:  Tell you what tube feeding formula and fluids to put through the tube.  Show you how to care for the skin around the tube. Be sure to follow instructions on keeping the area clean.  Teach you how to watch for infection or blockage of the tube.  Tell you what activities you can do.  Keep the formula in the refrigerator after opening it. Follow your doctor's instructions about how long formula that's in the hanging bag can sit out at room temperature.  For the caregiver  Wash your hands before handling the tube and formula. Wash the top of the can of formula before you open it.  Tube feedings that go into the stomach: The person you are caring for needs to be sitting up or have their head up during the feeding and for 30 to 60 minutes afterward. These feedings can be given in about 30 minutes, five or six times throughout a day.  Tube feedings that go into the intestine: The person you are caring for will  "have a pump that slowly pushes the formula into the intestine over several hours. This is often done at night.  If nausea, diarrhea, or stomach cramps happen during feeding, slow the rate that the formula comes through the tube. Then gradually increase the amount as the person can tolerate it.  Flush the tube with plain water after each feeding to keep it clean. Do not put anything other than formula or water through the tube unless your doctor has told you to.  Take care of yourself.  Do not try to do everything yourself. Ask other family members to help, and find out what other types of help may be available.  Eat well and get enough rest. Make sure you do not ignore your own health while you are caring for your loved one.  Schedule time for yourself. Get out of the house to do things you enjoy, run errands, or go shopping.  When should you call for help?   Call your doctor now or seek immediate medical care if:    You have signs of infection, such as:  Increased pain, swelling, warmth, or redness around the tube.  Red streaks leading from the area where the tube is inserted.  Pus draining from the tube area.  A fever.     The tube comes out or becomes blocked.     You have nausea, vomiting, or diarrhea.   Watch closely for changes in your health, and be sure to contact your doctor if:    You have any problems with your feeding.   Where can you learn more?  Go to https://www.[employer].net/patiented  Enter Y927 in the search box to learn more about \"Home Tube Feeding: Care Instructions.\"  Current as of: February 28, 2023               Content Version: 13.8    1928-5207 [employer].   Care instructions adapted under license by your healthcare professional. If you have questions about a medical condition or this instruction, always ask your healthcare professional. [employer] disclaims any warranty or liability for your use of this information.      "

## 2023-12-08 NOTE — TELEPHONE ENCOUNTER
Central Prior Authorization Team - Phone: [phone]     Prior Authorization Approval    Medication: NURTEC 75 MG PO TBDP  Authorization Effective Date: 12/8/2023  Authorization Expiration Date: 12/8/2024  Approved Dose/Quantity: 16 TABLETS PER 30 DAYS  Reference #:     Insurance Company: [employer] [place] - Phone [phone] Fax [phone]  Expected CoPay: $    CoPay Card Available:      Financial Assistance Needed:   Which Pharmacy is filling the prescription: [id number] - [place], [place] - [address] AT [facility] [address]   Pharmacy Notified: YES  Patient Notified: YES PHARMACY WILL NOTIFY WHEN READY

## 2023-12-08 NOTE — OR NURSING
Pt underwent gtube and jtube replacement under conscious sedation. Pt transferred to recovery and report given to endo RN.     Maricarmen Todd RN

## 2023-12-13 DIAGNOSIS — E16.2 HYPOGLYCEMIA: ICD-10-CM

## 2023-12-14 ENCOUNTER — VIRTUAL VISIT (OUTPATIENT)
Dept: ONCOLOGY | Facility: CLINIC | Age: 58
End: 2023-12-14
Attending: SOCIAL WORKER
Payer: COMMERCIAL

## 2023-12-14 VITALS
SYSTOLIC BLOOD PRESSURE: 121 MMHG | HEIGHT: 68 IN | WEIGHT: 125 LBS | DIASTOLIC BLOOD PRESSURE: 82 MMHG | BODY MASS INDEX: 18.94 KG/M2

## 2023-12-14 DIAGNOSIS — F43.23 ADJUSTMENT DISORDER WITH MIXED ANXIETY AND DEPRESSED MOOD: Primary | ICD-10-CM

## 2023-12-14 PROCEDURE — 90834 PSYTX W PT 45 MINUTES: CPT | Mod: 93 | Performed by: SOCIAL WORKER

## 2023-12-14 ASSESSMENT — PAIN SCALES - GENERAL: PAINLEVEL: MODERATE PAIN (4)

## 2023-12-14 NOTE — PROGRESS NOTES
"Virtual Visit Details    Type of service:  Video Visit     Originating Location (pt. Location): Home    Distant Location (provider location):  On-site  Platform used for Video Visit: Telephone-- difficulty connecting by amwell that was not fixed after both Dinora and JADEN had logged out and back in several times.           Palliative Care Clinical Social Work Return Appointment    PLEASE NOTE:  THIS IS A MENTAL HEALTH NOTE.  OTHER PROVIDERS VIEWING THIS NOTE SHOULD USE THIS ONLY FOR UNDERSTANDING THE CONTEXT OF THE PATIENT'S EXPERIENCE.  TOPICS DESCRIBED IN THIS NOTE SHOULD NOT BE REFERENCED TO THE PATIENT BY MEDICAL PROVIDERS.    Dinora Mcghee is a 58  year old woman with multiple medical conditions including chronic pancreatitis s/p TPAIT, long term complications from gastroparesis, constipation, GERD, migraines.  Had GJ placed Sept 2021, recovery was complicated and she ended up with a venting g-tube.  Due to complications with PO tolerance she had surgical placement of a J tube, and a resection of densely adhered small bowel.  Started on tube feedings, then on TPN.  TPN eventually stopped due to infection.  Has heavy symptom burden that includes chronic nausea and vomiting, fatigue, complex medical management. Seen today to address adjustment disorder with depressed mood and anxiety.     Was discharged from hospital day before our visit.     Mental Status Exam:(List all that apply)      Appearance: Appropriate      Eye Contact: Good       Orientation: Yes, x4      Mood: Depressed grieving       Affect: Appropriate -- tearful       Thought Content: Clear         Thought Form: Logical      Psychomotor Behavior: Normal    Visit themes: Anxiety, trauma, stress     Adjustment to Illness:  Feeling well today and has been feeling generally better.  Still has symptom burden, especially nausea and vomiting but feels she can manage at this time.       Mental Health (thoughts, feelings, actions, coping, and symptoms):     \"I " "think on the upswing, I'm in a better place\"     Looking forward to the holidays and having her family over.     Explored  ways that she can handle inquiries and comments about her health, especially related to how cautious she has be when taking food PO.  Talked about keeping answers short and pointed \"eating makes me sick, I have to be very cautious\"     We also talked about considering using her caring bridge site to give medical updates so she can refer people to read it rather than re-explain things everytime she sees someone.     Over all spirits are better at this point in time.     Describes connecting with a Sense of value and purpose.      Helpful activities: time with family.  Meditating, knitting gifts for family members and friends, tapping.  Uses EMDR techniques. Going for another walk today.         Helpful cognitions:     Unhelpful and/or triggering or exacerbating factors:     Body-Mind Skills Education: uses tapping and EMDR     Relationships:  and children, extended family    End of Life and Advance Care Planning:     Main therapeutic interventions provided this session include:     Provide psychoeducation and Facilitate processing of thoughts and feelings    Plan:  Will return for psychotherapy with palliative care focus in 6 weeks          Time spent with patient/family:   40  (Start 1:30 end 2:10)    JAIDA Nguyen, [facility]   Palliative Care Clinical   Ph: [phone]      DO NOT SEND ANY LETTERS              "

## 2023-12-14 NOTE — LETTER
12/14/2023         RE: Dinora Mcghee  [address] [facility] [place] [address]        Dear Colleague,    Thank you for referring your patient, Dinora Mcghee, to the [facility] [place]. Please see a copy of my visit note below.    Virtual Visit Details    Type of service:  Video Visit     Originating Location (pt. Location): Home    Distant Location (provider location):  On-site  Platform used for Video Visit: Telephone-- difficulty connecting by amwell that was not fixed after both Dinora and JADEN had logged out and back in several times.           Palliative Care Clinical Social Work Return Appointment    PLEASE NOTE:  THIS IS A MENTAL HEALTH NOTE.  OTHER PROVIDERS VIEWING THIS NOTE SHOULD USE THIS ONLY FOR UNDERSTANDING THE CONTEXT OF THE PATIENT'S EXPERIENCE.  TOPICS DESCRIBED IN THIS NOTE SHOULD NOT BE REFERENCED TO THE PATIENT BY MEDICAL PROVIDERS.    Dinora Mcghee is a 58  year old woman with multiple medical conditions including chronic pancreatitis s/p TPAIT, long term complications from gastroparesis, constipation, GERD, migraines.  Had GJ placed Sept 2021, recovery was complicated and she ended up with a venting g-tube.  Due to complications with PO tolerance she had surgical placement of a J tube, and a resection of densely adhered small bowel.  Started on tube feedings, then on TPN.  TPN eventually stopped due to infection.  Has heavy symptom burden that includes chronic nausea and vomiting, fatigue, complex medical management. Seen today to address adjustment disorder with depressed mood and anxiety.     Was discharged from hospital day before our visit.     Mental Status Exam:(List all that apply)      Appearance: Appropriate      Eye Contact: Good       Orientation: Yes, x4      Mood: Depressed grieving       Affect: Appropriate -- tearful       Thought Content: Clear         Thought Form: Logical      Psychomotor Behavior: Normal    Visit themes: Anxiety, trauma, stress  "    Adjustment to Illness:  Feeling well today and has been feeling generally better.  Still has symptom burden, especially nausea and vomiting but feels she can manage at this time.       Mental Health (thoughts, feelings, actions, coping, and symptoms):     \"I think on the upswing, I'm in a better place\"     Looking forward to the holidays and having her family over.     Explored  ways that she can handle inquiries and comments about her health, especially related to how cautious she has be when taking food PO.  Talked about keeping answers short and pointed \"eating makes me sick, I have to be very cautious\"     We also talked about considering using her caring bridge site to give medical updates so she can refer people to read it rather than re-explain things everytime she sees someone.     Over all spirits are better at this point in time.     Describes connecting with a Sense of value and purpose.      Helpful activities: time with family.  Meditating, knitting gifts for family members and friends, tapping.  Uses EMDR techniques. Going for another walk today.         Helpful cognitions:     Unhelpful and/or triggering or exacerbating factors:     Body-Mind Skills Education: uses tapping and EMDR     Relationships:  and children, extended family    End of Life and Advance Care Planning:     Main therapeutic interventions provided this session include:     Provide psychoeducation and Facilitate processing of thoughts and feelings    Plan:  Will return for psychotherapy with palliative care focus in 6 weeks          Time spent with patient/family:   40  (Start 1:30 end 2:10)    JAIDA Nguyen, [facility]   Palliative Care Clinical   Ph: [phone]      DO NOT SEND ANY LETTERS                Again, thank you for allowing me to participate in the care of your patient.        Sincerely,        VASQUEZ Nguyen  "

## 2023-12-14 NOTE — LETTER
12/14/2023         RE: Dinora Mcghee  [address] [facility] [address]        Dear Colleague,    Thank you for referring your patient, Dinora Mcghee, to the [facility] [place]. Please see a copy of my visit note below.    Virtual Visit Details    Type of service:  Video Visit     Originating Location (pt. Location): Home    Distant Location (provider location):  On-site  Platform used for Video Visit: Telephone-- difficulty connecting by amwell that was not fixed after both Dinora and JADEN had logged out and back in several times.           Palliative Care Clinical Social Work Return Appointment    PLEASE NOTE:  THIS IS A MENTAL HEALTH NOTE.  OTHER PROVIDERS VIEWING THIS NOTE SHOULD USE THIS ONLY FOR UNDERSTANDING THE CONTEXT OF THE PATIENT'S EXPERIENCE.  TOPICS DESCRIBED IN THIS NOTE SHOULD NOT BE REFERENCED TO THE PATIENT BY MEDICAL PROVIDERS.    Dinora Mcghee is a 58  year old woman with multiple medical conditions including chronic pancreatitis s/p TPAIT, long term complications from gastroparesis, constipation, GERD, migraines.  Had GJ placed Sept 2021, recovery was complicated and she ended up with a venting g-tube.  Due to complications with PO tolerance she had surgical placement of a J tube, and a resection of densely adhered small bowel.  Started on tube feedings, then on TPN.  TPN eventually stopped due to infection.  Has heavy symptom burden that includes chronic nausea and vomiting, fatigue, complex medical management. Seen today to address adjustment disorder with depressed mood and anxiety.     Was discharged from hospital day before our visit.     Mental Status Exam:(List all that apply)      Appearance: Appropriate      Eye Contact: Good       Orientation: Yes, x4      Mood: Depressed grieving       Affect: Appropriate -- tearful       Thought Content: Clear         Thought Form: Logical      Psychomotor Behavior: Normal    Visit themes: Anxiety, trauma, stress  "    Adjustment to Illness:  Feeling well today and has been feeling generally better.  Still has symptom burden, especially nausea and vomiting but feels she can manage at this time.       Mental Health (thoughts, feelings, actions, coping, and symptoms):     \"I think on the upswing, I'm in a better place\"     Looking forward to the holidays and having her family over.     Explored  ways that she can handle inquiries and comments about her health, especially related to how cautious she has be when taking food PO.  Talked about keeping answers short and pointed \"eating makes me sick, I have to be very cautious\"     We also talked about considering using her caring bridge site to give medical updates so she can refer people to read it rather than re-explain things everytime she sees someone.     Over all spirits are better at this point in time.     Describes connecting with a Sense of value and purpose.      Helpful activities: time with family.  Meditating, knitting gifts for family members and friends, tapping.  Uses EMDR techniques. Going for another walk today.         Helpful cognitions:     Unhelpful and/or triggering or exacerbating factors:     Body-Mind Skills Education: uses tapping and EMDR     Relationships:  and children, extended family    End of Life and Advance Care Planning:     Main therapeutic interventions provided this session include:     Provide psychoeducation and Facilitate processing of thoughts and feelings    Plan:  Will return for psychotherapy with palliative care focus in 6 weeks          Time spent with patient/family:   40  (Start 1:30 end 2:10)    JAIDA Nguyen, [facility]   Palliative Care Clinical   Ph: [phone]      DO NOT SEND ANY LETTERS                Again, thank you for allowing me to participate in the care of your patient.        Sincerely,        VASQUEZ Nguyen  "

## 2023-12-14 NOTE — NURSING NOTE
Is the patient currently in the state of [place]? YES    Visit mode:VIDEO    If the visit is dropped, the patient can be reconnected by: VIDEO VISIT: Text to cell phone:   Telephone Information:   Mobile [phone]       Will anyone else be joining the visit? NO  (If patient encounters technical issues they should call [phone])    How would you like to obtain your AVS? MyChart    Are changes needed to the allergy or medication list? No    Reason for visit: No chief complaint on file.    Darcy FLETCHER

## 2023-12-15 ENCOUNTER — DOCUMENTATION ONLY (OUTPATIENT)
Dept: GASTROENTEROLOGY | Facility: CLINIC | Age: 58
End: 2023-12-15
Payer: COMMERCIAL

## 2023-12-15 NOTE — PROGRESS NOTES
Called PT and confirmed Appointment.    Called to remind patient of their upcoming appointment with our GI clinic, on 12/18/23 at 11:30 AM  with Dr. Mandeep Park. This appointment is scheduled as a video visit. You will receive a call approximately 30 minutes prior to check you in, you must be in [place] for this visit., if your appointment is virtual (video or telephone) you need to be in [place] for the visit. To reschedule or cancel patient to call [phone].      SK

## 2023-12-18 ENCOUNTER — VIRTUAL VISIT (OUTPATIENT)
Dept: GASTROENTEROLOGY | Facility: CLINIC | Age: 58
End: 2023-12-18
Payer: COMMERCIAL

## 2023-12-18 VITALS — HEIGHT: 68 IN | BODY MASS INDEX: 19.04 KG/M2 | WEIGHT: 125.6 LBS

## 2023-12-18 DIAGNOSIS — Z93.4 SMALL BOWEL TUBE FEEDING (H): Primary | ICD-10-CM

## 2023-12-18 PROCEDURE — 99215 OFFICE O/P EST HI 40 MIN: CPT | Mod: 95 | Performed by: INTERNAL MEDICINE

## 2023-12-18 ASSESSMENT — PAIN SCALES - GENERAL: PAINLEVEL: MODERATE PAIN (4)

## 2023-12-18 NOTE — NURSING NOTE
Is the patient currently in the state of [place]? YES    Visit mode:VIDEO    If the visit is dropped, the patient can be reconnected by: VIDEO VISIT: Send to e-mail at: [email or web address]    Will anyone else be joining the visit? YES: How would they like to receive their invitation? Send to e-mail:   (If patient encounters technical issues they should call [phone])    How would you like to obtain your AVS? MyChart    Are changes needed to the allergy or medication list? No    Reason for visit: EDWARDO FLETCHER

## 2023-12-18 NOTE — PROGRESS NOTES
Dinora is following up since we replaced her G-tube and J-tube with a 14 Swedish 10 to 15 cm trimmed back length tube.  She is done much better now with increasing tube rate from a maximum tolerated rate of 30 cc an hour beyond which she had severe pain and diarrhea now up to 80 cc an hour 3 cans a day.  Despite that she is still maintaining weight at only 125 pounds and she is 5 foot 8 inches.  She is still on TPN although tapered back with significantly abnormal liver chemistries as documented in the recent visit with Dr. Gomez and Simin Medina, RN.  From that standpoint she is doing much better her main ongoing issues are what to do with TPN and line access and severe abdominal pain with bowel movements which happen every couple of days but which are really debilitating, and seem to her like partial small bowel obstruction.    To engage further expertise I texted  during the visit and he was kind enough to call right back and we had a three-way conversation.  As far as TPN he recommended tapering to half rate for 1 day then stop.  As far as line access since she has had a port removed due to infection recommendation is to continue with the PICC line for now as far as the severe abdominal pain recommendation was small bowel follow-through and refer to our pelvic floor center as below    We spent a total of 40 minutes on face-to-face video with Dinora and her  and 20 minutes reviewing data and formulating plans    1) UGI SBFT timing to be determined re travel  2) Taper TPN off 1/2 rate for one day then stop  3) Continue current PICC line for now  4) Follow-up one week after SBFT  5) Pelvic floor referral Dr Bates or Mehul HOLMAN      Joined the call at 12/18/2023, 11:45:37 am.  Left the call at 12/18/2023, 12:25:44 pm.  You were on the call for 40 minutes 7 seconds .    Past Medical, Surgical, Family, and Social Histories:    Past Medical History:   Diagnosis Date    Allergic rhinitis, cause  unspecified     Allergy to other foods     strawberries, apples, celeries, alice, watermelon    Arthritis     left knee    Choledocholithiasis     long after cholecystectomy    Chronic abdominal pain     Chronic constipation     Chronic nausea     Chronic pancreatitis (H)     Degeneration of lumbar or lumbosacral intervertebral disc     Esophageal reflux     w/ hiatal hernia    Gastroparesis     Hiatal hernia     History of pituitary adenoma     s/p resection    Hypothyroidism     Migraines     Mild hyperlipidemia     On tube feeding diet     presence of GJ tube    Pancreatic disease     PD stricture, 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 endometriosis growth    APPENDECTOMY       SECTION  1993    COLONOSCOPY  2014    COLONOSCOPY N/A 2023    Procedure: COLONOSCOPY, WITH POLYPECTOMY AND BIOPSY;  Surgeon: Percy Chamorro MD;  [facility]    ENDOSCOPIC INSERTION TUBE GASTROSTOMY N/A 2021    Procedure: Gastrojejonostomy placement with jejunopexy, PEG tube exchange;  Surgeon: Zackery Montoya MD;  [facility]    ENDOSCOPIC RETROGRADE CHOLANGIOPANCREATOGRAM N/A 2015    Procedure: ENDOSCOPIC RETROGRADE CHOLANGIOPANCREATOGRAM;  Surgeon: Mandeep Park MD;  [facility]    ENDOSCOPIC RETROGRADE CHOLANGIOPANCREATOGRAM N/A 2016    Procedure: COMBINED ENDOSCOPIC RETROGRADE CHOLANGIOPANCREATOGRAPHY, PLACE TUBE/STENT;  Surgeon: Mandeep Park MD;  [facility]    ENDOSCOPIC RETROGRADE CHOLANGIOPANCREATOGRAM N/A 2016    Procedure: COMBINED ENDOSCOPIC RETROGRADE CHOLANGIOPANCREATOGRAPHY, REMOVE FOREIGN BODY OR STENT/TUBE;  Surgeon:  Mandeep Park MD;  [facility]    ENDOSCOPIC RETROGRADE CHOLANGIOPANCREATOGRAM N/A 08/02/2016    Procedure: COMBINED ENDOSCOPIC RETROGRADE CHOLANGIOPANCREATOGRAPHY, PLACE TUBE/STENT;  Surgeon: Mandeep Park MD;  [facility]    ENDOSCOPIC RETROGRADE CHOLANGIOPANCREATOGRAM N/A 08/26/2016    Procedure: COMBINED ENDOSCOPIC RETROGRADE CHOLANGIOPANCREATOGRAPHY, REMOVE FOREIGN BODY OR STENT/TUBE;  Surgeon: Mandeep Park MD;  [facility]    ENDOSCOPIC ULTRASOUND UPPER GASTROINTESTINAL TRACT (GI) N/A 10/03/2016    Procedure: ENDOSCOPIC ULTRASOUND, ESOPHAGOSCOPY / UPPER GASTROINTESTINAL TRACT (GI);  Surgeon: Guru Jose Rodas MD;  [facility]    ENT SURGERY  1989    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    ENTEROSCOPY SMALL BOWEL N/A 02/03/2022    Procedure: ENTEROSCOPY with possible fistula closure;  Surgeon: Francisco Dodson MD;  Location:  GI    ESOPHAGOSCOPY, GASTROSCOPY, DUODENOSCOPY (EGD), COMBINED N/A 06/24/2015    Procedure: COMBINED ESOPHAGOSCOPY, GASTROSCOPY, DUODENOSCOPY (EGD), REMOVE FOREIGN BODY;  Surgeon: Mandeep Park MD;  Location:  GI    ESOPHAGOSCOPY, GASTROSCOPY, DUODENOSCOPY (EGD), COMBINED N/A 10/25/2015    Procedure: COMBINED ESOPHAGOSCOPY, GASTROSCOPY, DUODENOSCOPY (EGD);  Surgeon: Sammy Amaro MD;  Location:  GI    ESOPHAGOSCOPY, GASTROSCOPY, DUODENOSCOPY (EGD), COMBINED N/A 10/25/2015    Procedure: COMBINED ESOPHAGOSCOPY, GASTROSCOPY, DUODENOSCOPY (EGD), BIOPSY SINGLE OR MULTIPLE;  Surgeon: Sammy Amaro MD;  Location:  GI    ESOPHAGOSCOPY, GASTROSCOPY, DUODENOSCOPY (EGD), COMBINED N/A 01/31/2023    Procedure: ESOPHAGOGASTRODUODENOSCOPY (EGD) with peg replacement ;  Surgeon: Mandeep Park MD;  [facility]    ESOPHAGOSCOPY, GASTROSCOPY, DUODENOSCOPY (EGD), DILATATION, COMBINED      EXCISE LESION TRUNK N/A 04/17/2017    Procedure: EXCISE LESION TRUNK;  Removal of Abdominal Foreign Body;  Surgeon:  Nestor Phoenix MD;  [facility]    HC ESOPH/GAS REFLUX TEST W NASAL IMPED >1 HR N/A 11/19/2015    Procedure: ESOPHAGEAL IMPEDENCE FUNCTION TEST WITH 24 HOUR PH GREATER THAN 1 HOUR;  Surgeon: Thiago Apple MD;  [facility]    HC UGI ENDOSCOPY DIAG W BIOPSY  09/17/2008    HC UGI ENDOSCOPY DIAG W BIOPSY  09/27/2012    HC UGI ENDOSCOPY W ESOPHAGEAL DILATION BALLOON <30MM  09/17/2008    HC UGI ENDOSCOPY W EUS N/A 05/05/2015    Procedure: COMBINED ENDOSCOPIC ULTRASOUND, ESOPHAGOSCOPY, GASTROSCOPY, DUODENOSCOPY (EGD);  Surgeon: Wm Dueñas MD;  [facility]: [facility]    HC WRIST ARTHROSCOP,RELEASE XVERS LIG Bilateral 12/17/2008    INJECT TRANSVERSUS ABDOMINIS PLANE (TAP) BLOCK BILATERAL Left 09/22/2016    Procedure: INJECT TRANSVERSUS ABDOMINIS PLANE (TAP) BLOCK BILATERAL;  Surgeon: Dickson Corrigan MD;  [facility]    INJECT TRIGGER POINT Bilateral 09/08/2022    Procedure: Abdominal trigger point injection with ultrasound;  Surgeon: Monika Mahajan MD;  [facility]    INJECT TRIGGER POINT SINGLE / MULTIPLE 3 OR MORE MUSCLES Right 11/15/2022    Procedure: Trigger point injections right abdomen with ultrasound guidance;  Surgeon: Monika Mahajan MD;  Location: [facility]    IR CHEST PORT PLACEMENT < 5 YRS OF AGE  06/10/2022    IR PORT REMOVAL RIGHT  11/09/2023    laparoscopic pineda  01/01/1995    LAPAROSCOPIC HERNIORRHAPHY INCISIONAL N/A 08/23/2018    Procedure: LAPAROSCOPIC HERNIORRHAPHY INCISIONAL;  Laparoscopic Incisional Hernia Repair with Symbotex Mesh Implant;  Surgeon: Nestor Phoenix MD;  [facility]    LAPAROSCOPIC PANCREATECTOMY, TRANSPLANT AUTO ISLET CELL N/A 12/28/2016    Procedure: LAPAROSCOPIC PANCREATECTOMY, TRANSPLANT AUTO ISLET CELL;  Surgeon: Nestor Phoenix MD;  [facility]    LAPAROTOMY EXPLORATORY N/A 01/31/2023    Procedure: Exploratory Laparotomy, lysis of adhesions, Perforated J-Junostomy Resection, Replace J-Junostomy site;  Surgeon: Elver Bauer MD;   [facility]    LAPAROTOMY, LYSIS ADHESIONS, COMBINED N/A 01/31/2023    Procedure: Dilatation of jejunostomy feeding tube, track with placement of jejunostomy tube with fluoroscopy;  Surgeon: Elver Bauer MD;  [facility]    PICC DOUBLE LUMEN PLACEMENT Left 11/13/2023    Basilic Vein 5F DL 43 cm    REMOVE AND REPLACE BREAST IMPLANT PROSTHESIS N/A 12/30/2022    Procedure: Exploratory Laparotomy, lysis of adhesions, small bowel resection. Placement of gastric jejunostomy for enteral feeding.;  Surgeon: Elver Bauer MD;  [facility]    REMOVE GASTROSTOMY TUBE ADULT N/A 01/28/2022    Procedure: REMOVAL, GASTROSTOMY TUBE, ADULT;  Surgeon: Mandeep Park MD;  [facility]    REPLACE GASTROJEJUNOSTOMY TUBE, PERCUTANEOUS N/A 09/07/2021    Procedure: GASTROJEJUNOSTOMY TUBE PLACEMENT, PERCUTANEOUS, WITH GASTROPEXY;  Surgeon: Mandeep Park MD;  [facility]    REPLACE GASTROJEJUNOSTOMY TUBE, PERCUTANEOUS N/A 09/22/2021    Procedure: REPLACEMENT, GASTROJEJUNOSTOMY TUBE, PERCUTANEOUS;  Surgeon: Zackery Montoya MD;  [facility]    REPLACE GASTROJEJUNOSTOMY TUBE, PERCUTANEOUS N/A 11/22/2022    Procedure: REPLACEMENT, GASTROJEJUNOSTOMY TUBE, PERCUTANEOUS;  Surgeon: Mandeep Park MD;  [facility]    REPLACE GASTROJEJUNOSTOMY TUBE, PERCUTANEOUS N/A 12/09/2022    Procedure: REPLACEMENT, GASTROJEJUNOSTOMY TUBE, PERCUTANEOUS with ENDOSCOPIC SUTURING.;  Surgeon: Mandeep Park MD;  [facility]    REPLACE GASTROJEJUNOSTOMY TUBE, PERCUTANEOUS N/A 08/01/2023    Procedure: Replace Gastrojejunostomy Tube, Percutaneous;  Surgeon: Mandeep Park MD;  [facility]    REPLACE GASTROJEJUNOSTOMY TUBE, PERCUTANEOUS N/A 11/11/2023    Procedure: Replace Gastrojejunostomy Tube, Percutaneous;  Surgeon: Francisco Dodson MD;  [facility]    REPLACE GASTROJEJUNOSTOMY TUBE, PERCUTANEOUS N/A 12/8/2023    Procedure: Replace Gastrojejunostomy Tube,  Percutaneous;  Surgeon: Mandeep Park MD;  [facility]    REPLACE JEJUNOSTOMY TUBE, PERCUTANEOUS N/A 09/10/2021    Procedure: UPPER ENDOSCOPY, REPLACEMENT OF PERCUTANEOUS GASTROJEJUNOSTOMY TUBE, T-TAG GASTROPEXY;  Surgeon: Zackery Montoya MD;  [facility]    REPLACE JEJUNOSTOMY TUBE, PERCUTANEOUS N/A 2021    Procedure: REPLACEMENT, JEJUNOSTOMY TUBE, PERCUTANEOUS;  Surgeon: Mandeep Park MD;  [facility]    REPLACE JEJUNOSTOMY TUBE, PERCUTANEOUS N/A 2023    Procedure: REPLACEMENT, JEJUNOSTOMY TUBE, PERCUTANEOUS;  Surgeon: Mandeep Park MD;  [facility]    REPLACE JEJUNOSTOMY TUBE, PERCUTANEOUS  2023    Procedure: Replace Jejunostomy Tube, Percutaneous;  Surgeon: Mandeep Park MD;  [facility]    transphenoidal pituitary resection  1990    [facility]  DELIVERY ONLY  1996    [facility]  DELIVERY ONLY  1998    repeat c section with incidental cystotomy with repair    [facility] EXCIS PITUITARY,TRANSNASAL/SEPTAL  1980    pituitary tumor removed for diabetes insipidus    [facility] TOTAL ABDOM HYSTERECTOMY  1999    w/ bilateral salpingoophorectomy        Family History   Problem Relation Age of Onset    Lipids Mother     Hypertension Mother     Thyroid Disease Mother     Depression Mother     Angina Mother     GERD Mother     Skin Cancer Mother     Migraines Mother     Autoimmune Disease Mother     Hyperlipidemia Mother     Mental Illness Mother     Cerebrovascular Disease Mother         2023    Other Cancer Mother         skin-basil cell    Anxiety Disorder Mother     Eye Disorder Father         cataract, detached retina    Myocardial Infarction Father 60    Lipids Father     Cerebrovascular Disease Father     Depression Father     Substance Abuse Father     Anesthesia Reaction Father         stroke right after surgery    Cataracts Father     Osteoarthritis Father     Ulcerative Colitis Father     Autoimmune Disease  Father     Heart Disease Father     Hyperlipidemia Father     Mental Illness Father     Other Cancer Father         myloma    Anxiety Disorder Father     Interpersonal Violence Sister     Coronary Artery Disease Sister         angioplasty    GERD Sister     Substance Abuse Sister     Cerebrovascular Disease Sister         2005    Depression Sister     Thyroid Disease Sister     Eye Disorder Maternal Grandmother         cataract    Thyroid Disease Maternal Grandmother     Diabetes Maternal Grandfather     Eye Disorder Paternal Grandmother         cataract    Diabetes Paternal Grandmother     Eye Disorder Paternal Grandfather         cataract    Diabetes Paternal Grandfather     Substance Abuse Paternal Grandfather     Eye Disorder Son         ptosis    Depression Son     Anxiety Disorder Son     Heart Disease Paternal Aunt     Diabetes Paternal Aunt     Diabetes Paternal Uncle     Heart Disease Paternal Uncle     Depression Nephew     Anxiety Disorder Nephew     Thyroid Disease Nephew     Diabetes Type 2  Cousin         paternal cousin    Autoimmune Disease Cousin     Autoimmune Disease Sister     Depression Sister     Mental Illness Sister     Substance Abuse Sister     Thyroid Disease Sister     Depression Son     Mental Illness Son     Anxiety Disorder Son     Thyroid Disease Nephew     Anxiety Disorder Nephew        Social History     Tobacco Use    Smoking status: Former     Packs/day: 0.50     Years: 6.00     Additional pack years: 0.00     Total pack years: 3.00     Types: Cigarettes     Start date: 1985     Quit date: 1992     Years since quittin.9    Smokeless tobacco: Not on file    Tobacco comments:     no 2nd hand   Substance Use Topics    Alcohol use: Not Currently     Alcohol/week: 3.0 - 6.0 standard drinks of alcohol     Types: 1 - 2 Glasses of wine, 1 - 2 Cans of beer, 1 - 2 Shots of liquor per week     Comment: none since [facility]

## 2023-12-18 NOTE — LETTER
12/18/2023         RE: Dinora Mcghee  [address] [facility] [address]        Dear Colleague,    Thank you for referring your patient, Dinora Mcghee, to the [facility] [place]. Please see a copy of my visit note below.      Dinora is following up since we replaced her G-tube and J-tube with a 14 Greek 10 to 15 cm trimmed back length tube.  She is done much better now with increasing tube rate from a maximum tolerated rate of 30 cc an hour beyond which she had severe pain and diarrhea now up to 80 cc an hour 3 cans a day.  Despite that she is still maintaining weight at only 125 pounds and she is 5 foot 8 inches.  She is still on TPN although tapered back with significantly abnormal liver chemistries as documented in the recent visit with Dr. Gomez and Simin Medina, MONICA.  From that standpoint she is doing much better her main ongoing issues are what to do with TPN and line access and severe abdominal pain with bowel movements which happen every couple of days but which are really debilitating, and seem to her like partial small bowel obstruction.    To engage further expertise I texted  during the visit and he was kind enough to call right back and we had a three-way conversation.  As far as TPN he recommended tapering to half rate for 1 day then stop.  As far as line access since she has had a port removed due to infection recommendation is to continue with the PICC line for now as far as the severe abdominal pain recommendation was small bowel follow-through and refer to our pelvic floor center as below    We spent a total of 40 minutes on face-to-face video with Dinora and her  and 20 minutes reviewing data and formulating plans    1) UGI SBFT timing to be determined re travel  2) Taper TPN off 1/2 rate for one day then stop  3) Continue current PICC line for now  4) Follow-up one week after SBFT  5) Pelvic floor referral Dr Bates or  Mehul HOLMAN      Joined the call at 2023, 11:45:37 am.  Left the call at 2023, 12:25:44 pm.  You were on the call for 40 minutes 7 seconds .    Past Medical, Surgical, Family, and Social Histories:    Past Medical History:   Diagnosis Date    Allergic rhinitis, cause unspecified     Allergy to other foods     strawberries, apples, celeries, alice, watermelon    Arthritis     left knee    Choledocholithiasis     long after cholecystectomy    Chronic abdominal pain     Chronic constipation     Chronic nausea     Chronic pancreatitis (H)     Degeneration of lumbar or lumbosacral intervertebral disc     Esophageal reflux     w/ hiatal hernia    Gastroparesis     Hiatal hernia     History of pituitary adenoma     s/p resection    Hypothyroidism     Migraines     Mild hyperlipidemia     On tube feeding diet     presence of GJ tube    Pancreatic disease     PD stricture, 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 endometriosis growth    APPENDECTOMY       SECTION  1993    COLONOSCOPY  2014    COLONOSCOPY N/A 2023    Procedure: COLONOSCOPY, WITH POLYPECTOMY AND BIOPSY;  Surgeon: Percy Chamorro MD;  Location:  GI    ENDOSCOPIC INSERTION TUBE GASTROSTOMY N/A 2021    Procedure: Gastrojejonostomy placement with jejunopexy, PEG tube exchange;  Surgeon: Zackery Montoya MD;  Location: [facility] OR    ENDOSCOPIC RETROGRADE CHOLANGIOPANCREATOGRAM N/A 2015    Procedure: ENDOSCOPIC RETROGRADE CHOLANGIOPANCREATOGRAM;  Surgeon: Mandeep Park MD;  [facility]    ENDOSCOPIC RETROGRADE CHOLANGIOPANCREATOGRAM N/A 2016    Procedure: COMBINED ENDOSCOPIC RETROGRADE  CHOLANGIOPANCREATOGRAPHY, PLACE TUBE/STENT;  Surgeon: Mandeep Park MD;  [facility]    ENDOSCOPIC RETROGRADE CHOLANGIOPANCREATOGRAM N/A 03/17/2016    Procedure: COMBINED ENDOSCOPIC RETROGRADE CHOLANGIOPANCREATOGRAPHY, REMOVE FOREIGN BODY OR STENT/TUBE;  Surgeon: Mandeep Park MD;  [facility]    ENDOSCOPIC RETROGRADE CHOLANGIOPANCREATOGRAM N/A 08/02/2016    Procedure: COMBINED ENDOSCOPIC RETROGRADE CHOLANGIOPANCREATOGRAPHY, PLACE TUBE/STENT;  Surgeon: Mandeep Park MD;  [facility]    ENDOSCOPIC RETROGRADE CHOLANGIOPANCREATOGRAM N/A 08/26/2016    Procedure: COMBINED ENDOSCOPIC RETROGRADE CHOLANGIOPANCREATOGRAPHY, REMOVE FOREIGN BODY OR STENT/TUBE;  Surgeon: Mandeep Park MD;  [facility]    ENDOSCOPIC ULTRASOUND UPPER GASTROINTESTINAL TRACT (GI) N/A 10/03/2016    Procedure: ENDOSCOPIC ULTRASOUND, ESOPHAGOSCOPY / UPPER GASTROINTESTINAL TRACT (GI);  Surgeon: Guru Jose Rodas MD;  Location:  OR    ENT SURGERY  1989    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    ENTEROSCOPY SMALL BOWEL N/A 02/03/2022    Procedure: ENTEROSCOPY with possible fistula closure;  Surgeon: Francisco Dodson MD;  Location:  GI    ESOPHAGOSCOPY, GASTROSCOPY, DUODENOSCOPY (EGD), COMBINED N/A 06/24/2015    Procedure: COMBINED ESOPHAGOSCOPY, GASTROSCOPY, DUODENOSCOPY (EGD), REMOVE FOREIGN BODY;  Surgeon: Mandeep Park MD;  Location:  GI    ESOPHAGOSCOPY, GASTROSCOPY, DUODENOSCOPY (EGD), COMBINED N/A 10/25/2015    Procedure: COMBINED ESOPHAGOSCOPY, GASTROSCOPY, DUODENOSCOPY (EGD);  Surgeon: Sammy Amaro MD;  Location:  GI    ESOPHAGOSCOPY, GASTROSCOPY, DUODENOSCOPY (EGD), COMBINED N/A 10/25/2015    Procedure: COMBINED ESOPHAGOSCOPY, GASTROSCOPY, DUODENOSCOPY (EGD), BIOPSY SINGLE OR MULTIPLE;  Surgeon: Sammy Amaro MD;  Location:  GI    ESOPHAGOSCOPY, GASTROSCOPY, DUODENOSCOPY (EGD), COMBINED N/A 01/31/2023    Procedure: ESOPHAGOGASTRODUODENOSCOPY  (EGD) with peg replacement ;  Surgeon: Mandeep Park MD;  [facility]    ESOPHAGOSCOPY, GASTROSCOPY, DUODENOSCOPY (EGD), DILATATION, COMBINED      EXCISE LESION TRUNK N/A 04/17/2017    Procedure: EXCISE LESION TRUNK;  Removal of Abdominal Foreign Body;  Surgeon: Nestor Phoenix MD;  [facility]    HC ESOPH/GAS REFLUX TEST W NASAL IMPED >1 HR N/A 11/19/2015    Procedure: ESOPHAGEAL IMPEDENCE FUNCTION TEST WITH 24 HOUR PH GREATER THAN 1 HOUR;  Surgeon: Thiago Apple MD;  [facility]    HC UGI ENDOSCOPY DIAG W BIOPSY  09/17/2008    HC UGI ENDOSCOPY DIAG W BIOPSY  09/27/2012    HC UGI ENDOSCOPY W ESOPHAGEAL DILATION BALLOON <30MM  09/17/2008    HC UGI ENDOSCOPY W EUS N/A 05/05/2015    Procedure: COMBINED ENDOSCOPIC ULTRASOUND, ESOPHAGOSCOPY, GASTROSCOPY, DUODENOSCOPY (EGD);  Surgeon: Wm Dueñas MD;  [facility]    HC WRIST ARTHROSCOP,RELEASE XVERS LIG Bilateral 12/17/2008    INJECT TRANSVERSUS ABDOMINIS PLANE (TAP) BLOCK BILATERAL Left 09/22/2016    Procedure: INJECT TRANSVERSUS ABDOMINIS PLANE (TAP) BLOCK BILATERAL;  Surgeon: Dickson Corrigan MD;  [facility]    INJECT TRIGGER POINT Bilateral 09/08/2022    Procedure: Abdominal trigger point injection with ultrasound;  Surgeon: Monika Mahajan MD;  [facility]    INJECT TRIGGER POINT SINGLE / MULTIPLE 3 OR MORE MUSCLES Right 11/15/2022    Procedure: Trigger point injections right abdomen with ultrasound guidance;  Surgeon: Monika Mahajan MD;  [facility]    IR CHEST PORT PLACEMENT < 5 YRS OF AGE  06/10/2022    IR PORT REMOVAL RIGHT  11/09/2023    laparoscopic pineda  01/01/1995    LAPAROSCOPIC HERNIORRHAPHY INCISIONAL N/A 08/23/2018    Procedure: LAPAROSCOPIC HERNIORRHAPHY INCISIONAL;  Laparoscopic Incisional Hernia Repair with Symbotex Mesh Implant;  Surgeon: Nestor Phoenix MD;  [facility]    LAPAROSCOPIC PANCREATECTOMY, TRANSPLANT AUTO ISLET CELL N/A 12/28/2016    Procedure: LAPAROSCOPIC PANCREATECTOMY,  TRANSPLANT AUTO ISLET CELL;  Surgeon: Nestor Phoenix MD;  [facility]    LAPAROTOMY EXPLORATORY N/A 01/31/2023    Procedure: Exploratory Laparotomy, lysis of adhesions, Perforated J-Junostomy Resection, Replace J-Junostomy site;  Surgeon: Elver Bauer MD;  [facility]    LAPAROTOMY, LYSIS ADHESIONS, COMBINED N/A 01/31/2023    Procedure: Dilatation of jejunostomy feeding tube, track with placement of jejunostomy tube with fluoroscopy;  Surgeon: Elver Bauer MD;  [facility]    PICC DOUBLE LUMEN PLACEMENT Left 11/13/2023    Basilic Vein 5F DL 43 cm    REMOVE AND REPLACE BREAST IMPLANT PROSTHESIS N/A 12/30/2022    Procedure: Exploratory Laparotomy, lysis of adhesions, small bowel resection. Placement of gastric jejunostomy for enteral feeding.;  Surgeon: Elver Bauer MD;  [facility]    REMOVE GASTROSTOMY TUBE ADULT N/A 01/28/2022    Procedure: REMOVAL, GASTROSTOMY TUBE, ADULT;  Surgeon: Mandeep Park MD;  [facility]    REPLACE GASTROJEJUNOSTOMY TUBE, PERCUTANEOUS N/A 09/07/2021    Procedure: GASTROJEJUNOSTOMY TUBE PLACEMENT, PERCUTANEOUS, WITH GASTROPEXY;  Surgeon: Mandeep Park MD;  [facility]    REPLACE GASTROJEJUNOSTOMY TUBE, PERCUTANEOUS N/A 09/22/2021    Procedure: REPLACEMENT, GASTROJEJUNOSTOMY TUBE, PERCUTANEOUS;  Surgeon: Zackery Montoya MD;  [facility]    REPLACE GASTROJEJUNOSTOMY TUBE, PERCUTANEOUS N/A 11/22/2022    Procedure: REPLACEMENT, GASTROJEJUNOSTOMY TUBE, PERCUTANEOUS;  Surgeon: Mandeep Park MD;  [facility]    REPLACE GASTROJEJUNOSTOMY TUBE, PERCUTANEOUS N/A 12/09/2022    Procedure: REPLACEMENT, GASTROJEJUNOSTOMY TUBE, PERCUTANEOUS with ENDOSCOPIC SUTURING.;  Surgeon: Mandeep Park MD;  [facility]    REPLACE GASTROJEJUNOSTOMY TUBE, PERCUTANEOUS N/A 08/01/2023    Procedure: Replace Gastrojejunostomy Tube, Percutaneous;  Surgeon: Mandeep Park MD;  [facility]     REPLACE GASTROJEJUNOSTOMY TUBE, PERCUTANEOUS N/A 2023    Procedure: Replace Gastrojejunostomy Tube, Percutaneous;  Surgeon: Francisco Dodson MD;  [facility]    REPLACE GASTROJEJUNOSTOMY TUBE, PERCUTANEOUS N/A 2023    Procedure: Replace Gastrojejunostomy Tube, Percutaneous;  Surgeon: Mandeep Park MD;  [facility]    REPLACE JEJUNOSTOMY TUBE, PERCUTANEOUS N/A 09/10/2021    Procedure: UPPER ENDOSCOPY, REPLACEMENT OF PERCUTANEOUS GASTROJEJUNOSTOMY TUBE, T-TAG GASTROPEXY;  Surgeon: Zackery Montoya MD;  [facility]    REPLACE JEJUNOSTOMY TUBE, PERCUTANEOUS N/A 2021    Procedure: REPLACEMENT, JEJUNOSTOMY TUBE, PERCUTANEOUS;  Surgeon: Mandeep Park MD;  [facility]    REPLACE JEJUNOSTOMY TUBE, PERCUTANEOUS N/A 2023    Procedure: REPLACEMENT, JEJUNOSTOMY TUBE, PERCUTANEOUS;  Surgeon: Mandeep Park MD;  [facility]    REPLACE JEJUNOSTOMY TUBE, PERCUTANEOUS  2023    Procedure: Replace Jejunostomy Tube, Percutaneous;  Surgeon: Mandeep Park MD;  [facility]    transphenoidal pituitary resection  1990    [facility]  DELIVERY ONLY  1996    [facility]  DELIVERY ONLY  1998    repeat c section with incidental cystotomy with repair    [facility] EXCIS PITUITARY,TRANSNASAL/SEPTAL  1980    pituitary tumor removed for diabetes insipidus    [facility] TOTAL ABDOM HYSTERECTOMY  1999    w/ bilateral salpingoophorectomy        Family History   Problem Relation Age of Onset    Lipids Mother     Hypertension Mother     Thyroid Disease Mother     Depression Mother     Angina Mother     GERD Mother     Skin Cancer Mother     Migraines Mother     Autoimmune Disease Mother     Hyperlipidemia Mother     Mental Illness Mother     Cerebrovascular Disease Mother         2023    Other Cancer Mother         skin-basil cell    Anxiety Disorder Mother     Eye Disorder Father         cataract, detached retina    Myocardial Infarction Father  60    Lipids Father     Cerebrovascular Disease Father     Depression Father     Substance Abuse Father     Anesthesia Reaction Father         stroke right after surgery    Cataracts Father     Osteoarthritis Father     Ulcerative Colitis Father     Autoimmune Disease Father     Heart Disease Father     Hyperlipidemia Father     Mental Illness Father     Other Cancer Father         myloma    Anxiety Disorder Father     Interpersonal Violence Sister     Coronary Artery Disease Sister         angioplasty    GERD Sister     Substance Abuse Sister     Cerebrovascular Disease Sister         2005    Depression Sister     Thyroid Disease Sister     Eye Disorder Maternal Grandmother         cataract    Thyroid Disease Maternal Grandmother     Diabetes Maternal Grandfather     Eye Disorder Paternal Grandmother         cataract    Diabetes Paternal Grandmother     Eye Disorder Paternal Grandfather         cataract    Diabetes Paternal Grandfather     Substance Abuse Paternal Grandfather     Eye Disorder Son         ptosis    Depression Son     Anxiety Disorder Son     Heart Disease Paternal Aunt     Diabetes Paternal Aunt     Diabetes Paternal Uncle     Heart Disease Paternal Uncle     Depression Nephew     Anxiety Disorder Nephew     Thyroid Disease Nephew     Diabetes Type 2  Cousin         paternal cousin    Autoimmune Disease Cousin     Autoimmune Disease Sister     Depression Sister     Mental Illness Sister     Substance Abuse Sister     Thyroid Disease Sister     Depression Son     Mental Illness Son     Anxiety Disorder Son     Thyroid Disease Nephew     Anxiety Disorder Nephew        Social History     Tobacco Use    Smoking status: Former     Packs/day: 0.50     Years: 6.00     Additional pack years: 0.00     Total pack years: 3.00     Types: Cigarettes     Start date: 1985     Quit date: 1992     Years since quittin.9    Smokeless tobacco: Not on file    Tobacco comments:     no 2nd hand   Substance  Use Topics    Alcohol use: Not Currently     Alcohol/week: 3.0 - 6.0 standard drinks of alcohol     Types: 1 - 2 Glasses of wine, 1 - 2 Cans of beer, 1 - 2 Shots of liquor per week     Comment: none since IGG          Again, thank you for allowing me to participate in the care of your patient.      Sincerely,    Mandeep Park MD

## 2023-12-20 ENCOUNTER — DOCUMENTATION ONLY (OUTPATIENT)
Dept: GASTROENTEROLOGY | Facility: CLINIC | Age: 58
End: 2023-12-20
Payer: COMMERCIAL

## 2023-12-20 ENCOUNTER — MEDICAL CORRESPONDENCE (OUTPATIENT)
Dept: HEALTH INFORMATION MANAGEMENT | Facility: CLINIC | Age: 58
End: 2023-12-20
Payer: COMMERCIAL

## 2023-12-20 DIAGNOSIS — Z46.59 ENCOUNTER FOR CARE RELATED TO FEEDING TUBE: Primary | ICD-10-CM

## 2023-12-20 NOTE — PROGRESS NOTES
PFC order, demographics, last office note was faxed to the [facility] at [phone]. Order scanned into chart.     Monika Grullon LPN

## 2023-12-20 NOTE — PROGRESS NOTES
Faxed urgent imaging order per request.    Imaging ordered by Dr. Mandeep Park:  -- X-ray Abdomen 2 vw    Facility Information:  [address] [address]   Phone #: [phone]  Radiology Phone #: [phone]  Radiology Fax #: [phone]      SK

## 2023-12-20 NOTE — PROGRESS NOTES
Post clinic follow up    1) UGI SBFT timing to be determined re travel  2) Taper TPN off 1/2 rate for one day then stop  3) Continue current PICC line for now  4) Follow-up one week after SBFT  5) Pelvic floor referral Dr Bates or Mehul HOLMAN    And abd xray in [place] today to check tube placement    Left message to find out where to send xray orders, [employer] also sent    ML

## 2023-12-20 NOTE — PROGRESS NOTES
Called to request copies of images completed today.     Imaging ordered by Dr. Mandeep Park:  -- X-ray Abdomen 2 vw     Facility Information:  [address], [place] [address]   Phone #: [phone]  Radiology Phone #: [phone]  Radiology Fax #: [phone]        SK

## 2023-12-27 ENCOUNTER — DOCUMENTATION ONLY (OUTPATIENT)
Dept: INTERNAL MEDICINE | Facility: CLINIC | Age: 58
End: 2023-12-27
Payer: COMMERCIAL

## 2023-12-27 DIAGNOSIS — E10.9 TYPE 1 DIABETES MELLITUS WITHOUT COMPLICATION (H): Primary | ICD-10-CM

## 2023-12-27 NOTE — PROGRESS NOTES
Type of Form Received:     Form Received (Date) 12/27/23   Form Filled out Yes, faxed 12/29   Placed in provider folder Yes

## 2023-12-28 ENCOUNTER — TRANSFERRED RECORDS (OUTPATIENT)
Dept: HEALTH INFORMATION MANAGEMENT | Facility: CLINIC | Age: 58
End: 2023-12-28
Payer: COMMERCIAL

## 2023-12-28 DIAGNOSIS — Z53.9 DIAGNOSIS NOT YET DEFINED: Primary | ICD-10-CM

## 2023-12-29 ENCOUNTER — DOCUMENTATION ONLY (OUTPATIENT)
Dept: TRANSPLANT | Facility: CLINIC | Age: 58
End: 2023-12-29
Payer: COMMERCIAL

## 2024-01-10 ENCOUNTER — PATIENT OUTREACH (OUTPATIENT)
Dept: GASTROENTEROLOGY | Facility: CLINIC | Age: 59
End: 2024-01-10
Payer: COMMERCIAL

## 2024-01-10 ENCOUNTER — MYC MEDICAL ADVICE (OUTPATIENT)
Dept: CARE COORDINATION | Facility: CLINIC | Age: 59
End: 2024-01-10
Payer: COMMERCIAL

## 2024-01-10 NOTE — TELEPHONE ENCOUNTER
Patient called in with 2 requests    Orders for ETOH swabs for her central line (no longer supplied since she's off TPN but still using for fluids/meds) Called [facility] with verbal orders to add alcohol swabs with her deliveries. Verbal orders for 2 boxes per month given to [facility]    Inquiring about going up to TID on promethazine 25 mg. Message sent to Dr. Park a message to inquire about med increase.     Patient is struggling with vomiting her GI meds. Patient was in [place] ER related to a bump on her temple that was displacing her glasses, this happened a few years ago too. ER wasn't worried about it, said to manage with tylenol related to inflammation.     Told patient I would update her once I hear back from Dr. Park about increase on phenergan.     ML

## 2024-01-10 NOTE — TELEPHONE ENCOUNTER
RN out of clinic for scheduled day of appt with patient. RN reached out to patient to reschedule, and rescheduled for 1/15 at 1:00pm.     DARIELA ADAMS RN on 1/10/2024 at 4:09 PM

## 2024-01-10 NOTE — TELEPHONE ENCOUNTER
"Talked to Antoinette, pharmacist at [facility] about increasing phenergan 25mg q8 hours PRN to TID from BID, verbal orders given.  Update sent to patient via [employer]    Verbal orders also given for caps for patient's central line \"green caps\"  [email or web address]    ML  "

## 2024-01-15 DIAGNOSIS — K31.84 GASTROPARESIS: ICD-10-CM

## 2024-01-15 DIAGNOSIS — R10.84 ABDOMINAL PAIN, GENERALIZED: ICD-10-CM

## 2024-01-17 DIAGNOSIS — K59.01 SLOW TRANSIT CONSTIPATION: ICD-10-CM

## 2024-01-17 DIAGNOSIS — K58.9 IRRITABLE BOWEL SYNDROME, UNSPECIFIED TYPE: ICD-10-CM

## 2024-01-17 DIAGNOSIS — K59.00 CONSTIPATION, UNSPECIFIED CONSTIPATION TYPE: ICD-10-CM

## 2024-01-17 DIAGNOSIS — K59.04 CHRONIC IDIOPATHIC CONSTIPATION: ICD-10-CM

## 2024-01-17 DIAGNOSIS — K31.84 GASTROPARESIS: ICD-10-CM

## 2024-01-18 ENCOUNTER — DOCUMENTATION ONLY (OUTPATIENT)
Dept: GASTROENTEROLOGY | Facility: CLINIC | Age: 59
End: 2024-01-18

## 2024-01-18 ENCOUNTER — PATIENT OUTREACH (OUTPATIENT)
Dept: INTERNAL MEDICINE | Facility: CLINIC | Age: 59
End: 2024-01-18
Payer: COMMERCIAL

## 2024-01-18 RX ORDER — PROCHLORPERAZINE 25 MG/1
SUPPOSITORY RECTAL
Qty: 10 SUPPOSITORY | Refills: 0 | Status: SHIPPED | OUTPATIENT
Start: 2024-01-18 | End: 2024-05-15

## 2024-01-18 NOTE — PROGRESS NOTES
Pt notified of Dr. Gomez's reviewing of ED note via [employer] message as requested.    DARIELA ADAMS RN on 1/18/2024 at 2:06 PM

## 2024-01-18 NOTE — TELEPHONE ENCOUNTER
I reviewed the ER note; no particular follow-up recommended this time.  Just recommend monitoring the area and let us know if it is growing, becomes red, or more painful.    Thanks,  Juan Jose Gomez MD

## 2024-01-18 NOTE — PROGRESS NOTES
INCOMING FAX:      Facility:       [facility]  [address] [place] [address]  Phone: [phone]  Fax: [phone]      What:   Prescribers orders. Ophelia Stock, PICC. Coverage 10/14/2023    Routed document to Jo and Cheri Camara to review and sign.     Monika Grullon LPN

## 2024-01-18 NOTE — PROGRESS NOTES
Clinic Care Coordination Contact  Follow Up Progress Note      Assessment: RN called and spoke with patient for scheduled call. Per patient, she was recently in the ER in [place]. She states that she had traveled, and was exhausted and in bed from traveling for 2 days, and then on the 3rd, day, she woke with a lump on her temple. She did the [place] symptom tracker, and it let her know to go in. They ruled out a mass as well as stroke/aneurysm (she has a family hx of both of these things)- she states the swelling is down, but it is still a lump, and still hurts when she touches it. She would like Dr. Gomez to look over this at the next visit, but also to see if she or Neurology would be able to check into her ED visit notes and see if there is anything they would like her to have completed before her scheduled follow ups in February and March.     She also mentioned she would like her Syndros refilled- RN informed that this was refilled by Dr. Gomez on 1/14 [facility] with the plan that patient will discuss further with her at next appt in clinic. Pt states understanding and will check the delivery tracker for  [facility]. Pt does state that she has not thrown up for 3 days, and her weight is staying consistent at 125, not losing any further weight.     Since last visit:   -patient stopped thiamine tablet (per patient, going well)  -started Nurtec (neurology- migraines) (doesn't work very well, prefferd the other medication, thinking still have breakthrough headaches)   -tube exchanged, previously not being able to advance beyond 40mL/hr, now can run consistent   -long acting and short acting insulin (lot of sugar in medications), jump into 200s whenever takes medications, Dr. Melissa is working on this     For January:   -60 day supply of Dronabinol will be available to get from  specialty pharmacy (will last until next follow up appt in March)   -patient to discuss plan with Dr. Park for long term TPN  plan (complete)  -discuss abnormal liver labs with Dr. Park (talked about this, took her off of TPN and tolerating fine)  -second Hep B vaccine for health maintenance (not yet completed, pt is aware needs to get at [facility] or similar)    Care Gaps:    Health Maintenance Due   Topic Date Due    NICOTINE/TOBACCO CESSATION COUNSELING Q 1 YR  Never done    DIABETIC FOOT EXAM  Never done    URINE DRUG SCREEN  Never done    DEPRESSION ACTION PLAN  Never done    MIGRAINE ACTION PLAN  Never done    EYE EXAM  02/19/2020    HEPATITIS B IMMUNIZATION (2 of 3 - 19+ 3-dose series) 10/16/2023    COVID-19 Vaccine (7 - 2023-24 season) 01/24/2024       Currently there are no Care Gaps.    Care Plans  Care Plan: Health Maintenance       Problem: Health Maintenance Due or Overdue       Long-Range Goal: Become up-to-date with health maintenance visit(s)       Start Date: 12/5/2023    This Visit's Progress: On track Recent Progress: On track    Priority: High    Note:     Barriers: patient has complex illness and medical care, collaborates with various specialty teams  Strengths: patient has a great relationship with providers and members of care team, keeps healthcare management organized  Patient expressed understanding of goal: yes  Action steps to achieve this goal:  1. I will attend all of my upcoming appointments with my specialist and primary care provider.   2. I will communicate with my RN CC if there are further needs I have for assistance and support with primary care.   3. I will work with my RN CC on identifying all of the goals my providers have for helping me reach my health care needs.     RN goals:  RN to look at pt's care plans from previous visit notes with providers. RN to help patient identify the items that have yet to be completed and stay on top of health care goals.   RN to collaborate with [facility] RN CC for transplant and GI.                                   Intervention/Education provided during  outreach: Patient states understanding. Patient has no further questions or concerns regarding above assessment. Patient is aware to call the clinic and reach out to the care team with any further questions.          Plan:   RN to share this note with Neurology and PCP regarding the lump and further testing needed at this time before follow ups. Patient would like MyChart communication with any information needed. Pt was educated to seek emergency care/contact clinic for next steps if starts to increase in size/pain.   Care Coordinator will follow up in 4 weeks, 2/15 at 11:00am.    DARIELA ADAMS RN on 1/18/2024 at 12:29 PM

## 2024-01-20 NOTE — TELEPHONE ENCOUNTER
prucalopride succinate (MOTEGRITY) 2 MG tablet   Historical.    2/8/2023  [facility] [place]      Vijaya Genao MD  Gastroenterology      Routed because: historical

## 2024-01-22 ENCOUNTER — TELEPHONE (OUTPATIENT)
Dept: GASTROENTEROLOGY | Facility: CLINIC | Age: 59
End: 2024-01-22
Payer: COMMERCIAL

## 2024-01-22 ENCOUNTER — TRANSFERRED RECORDS (OUTPATIENT)
Dept: HEALTH INFORMATION MANAGEMENT | Facility: CLINIC | Age: 59
End: 2024-01-22
Payer: COMMERCIAL

## 2024-01-22 RX ORDER — PRUCALOPRIDE 2 MG/1
2 TABLET, FILM COATED ORAL DAILY
Qty: 30 TABLET | Refills: 6 | Status: SHIPPED | OUTPATIENT
Start: 2024-01-22 | End: 2024-07-24

## 2024-01-22 NOTE — TELEPHONE ENCOUNTER
[facility] Call Center    Phone Message    May a detailed message be left on voicemail: yes     Reason for Call: Medication Refill Request    Has the patient contacted the pharmacy for the refill? Yes   Name of medication being requested: [facility]   Provider who prescribed the medication: Dr Genao   Pharmacy: [facility] Mail order   Date medication is needed: ASAP pt has 3 days worth left        Action Taken: Other: [facility] gi    Travel Screening: Not Applicable

## 2024-01-26 ENCOUNTER — TRANSFERRED RECORDS (OUTPATIENT)
Dept: HEALTH INFORMATION MANAGEMENT | Facility: CLINIC | Age: 59
End: 2024-01-26
Payer: COMMERCIAL

## 2024-02-01 ENCOUNTER — VIRTUAL VISIT (OUTPATIENT)
Dept: ONCOLOGY | Facility: CLINIC | Age: 59
End: 2024-02-01
Attending: STUDENT IN AN ORGANIZED HEALTH CARE EDUCATION/TRAINING PROGRAM
Payer: COMMERCIAL

## 2024-02-01 DIAGNOSIS — F43.23 ADJUSTMENT DISORDER WITH MIXED ANXIETY AND DEPRESSED MOOD: Primary | ICD-10-CM

## 2024-02-01 DIAGNOSIS — Z51.5 PALLIATIVE CARE PATIENT: ICD-10-CM

## 2024-02-01 PROCEDURE — 90834 PSYTX W PT 45 MINUTES: CPT | Mod: 95 | Performed by: SOCIAL WORKER

## 2024-02-01 NOTE — LETTER
2/1/2024         RE: Dinora Mcghee  [address] [facility] [address]        Dear Colleague,    Thank you for referring your patient, Dinora Mcghee, to the [facility] [place]. Please see a copy of my visit note below.    Virtual Visit Details    Type of service:  Video Visit     Originating Location (pt. Location): Home    Distant Location (provider location):  On-site  Platform used for Video Visit: [employer]--         Palliative Care Clinical Social Work Return Appointment    PLEASE NOTE:  THIS IS A MENTAL HEALTH NOTE.  OTHER PROVIDERS VIEWING THIS NOTE SHOULD USE THIS ONLY FOR UNDERSTANDING THE CONTEXT OF THE PATIENT'S EXPERIENCE.  TOPICS DESCRIBED IN THIS NOTE SHOULD NOT BE REFERENCED TO THE PATIENT BY MEDICAL PROVIDERS.    Dinora Mcghee is a 58  year old woman with multiple medical conditions including chronic pancreatitis s/p TPAIT, long term complications from gastroparesis, constipation, GERD, migraines.  Had GJ placed Sept 2021, recovery was complicated and she ended up with a venting g-tube.  Due to complications with PO tolerance she had surgical placement of a J tube, and a resection of densely adhered small bowel.  Started on tube feedings, then on TPN.  TPN eventually stopped due to infection.  Has heavy symptom burden that includes chronic nausea and vomiting, fatigue, complex medical management. Seen today to address adjustment disorder with depressed mood and anxiety.        Mental Status Exam:(List all that apply)      Appearance: Appropriate      Eye Contact: Good       Orientation: Yes, x4      Mood: Normal       Affect: Appropriate       Thought Content: Clear         Thought Form: Logical      Psychomotor Behavior: Normal    Visit themes: Grief, loss, adjustment, ambiguous grief     Adjustment to Illness:  Feeling well today and has been feeling generally better.  Had an 11 day stretch with no vomiting! Continuing to adjust to symptoms but has been more able to leave  the house.  How hospitalizations since we last met!  Off TPN.       Mental Health (thoughts, feelings, actions, coping, and symptoms):     Working to connect with activities that give sense of meaning / purpose -- had friends over to teach them how to teresa, cared for a friend after her beloved dog ; Continues to grieve her identity attached to physical abilities and her identity as an athlete.     Discussed ambiguous grief for changes in health, that adjustment brings grief alongside it.         Helpful activities: time with family.  Meditating, knitting gifts for family members and friends, tapping.  Uses EMDR techniques. Able to go to [place] for a family wedding, has another trip coming up that she is looking forward to; Going to a painting class with her sister later today; doing conditioning work with a friend to build strength back up.         Helpful cognitions:     Unhelpful and/or triggering or exacerbating factors:     Body-Mind Skills Education: uses tapping and EMDR     Relationships:  and children, extended family    End of Life and Advance Care Planning:     Main therapeutic interventions provided this session include:     Provide psychoeducation and Facilitate processing of thoughts and feelings    Plan:  Will return for psychotherapy with palliative care focus in 6 weeks          Time spent with patient/family:   45 (Start 2:30 end 31!5)    JAIDA Nguyen, [facility]   Palliative Care Clinical   Ph: [phone]      DO NOT SEND ANY LETTERS                Again, thank you for allowing me to participate in the care of your patient.        Sincerely,        VASQUEZ Nguyen

## 2024-02-01 NOTE — PROGRESS NOTES
Virtual Visit Details    Type of service:  Video Visit     Originating Location (pt. Location): Home    Distant Location (provider location):  On-site  Platform used for Video Visit: Em[employer]--         Palliative Care Clinical Social Work Return Appointment    PLEASE NOTE:  THIS IS A MENTAL HEALTH NOTE.  OTHER PROVIDERS VIEWING THIS NOTE SHOULD USE THIS ONLY FOR UNDERSTANDING THE CONTEXT OF THE PATIENT'S EXPERIENCE.  TOPICS DESCRIBED IN THIS NOTE SHOULD NOT BE REFERENCED TO THE PATIENT BY MEDICAL PROVIDERS.    Dinora Mcghee is a 58  year old woman with multiple medical conditions including chronic pancreatitis s/p TPAIT, long term complications from gastroparesis, constipation, GERD, migraines.  Had GJ placed Sept 2021, recovery was complicated and she ended up with a venting g-tube.  Due to complications with PO tolerance she had surgical placement of a J tube, and a resection of densely adhered small bowel.  Started on tube feedings, then on TPN.  TPN eventually stopped due to infection.  Has heavy symptom burden that includes chronic nausea and vomiting, fatigue, complex medical management. Seen today to address adjustment disorder with depressed mood and anxiety.        Mental Status Exam:(List all that apply)      Appearance: Appropriate      Eye Contact: Good       Orientation: Yes, x4      Mood: Normal       Affect: Appropriate       Thought Content: Clear         Thought Form: Logical      Psychomotor Behavior: Normal    Visit themes: Grief, loss, adjustment, ambiguous grief     Adjustment to Illness:  Feeling well today and has been feeling generally better.  Had an 11 day stretch with no vomiting! Continuing to adjust to symptoms but has been more able to leave the house.  How hospitalizations since we last met!  Off TPN.       Mental Health (thoughts, feelings, actions, coping, and symptoms):     Working to connect with activities that give sense of meaning / purpose -- had friends over to teach them how to  teresa, cared for a friend after her beloved dog ; Continues to grieve her identity attached to physical abilities and her identity as an athlete.     Discussed ambiguous grief for changes in health, that adjustment brings grief alongside it.         Helpful activities: time with family.  Meditating, knitting gifts for family members and friends, tapping.  Uses EMDR techniques. Able to go to [place] for a family wedding, has another trip coming up that she is looking forward to; Going to a painting class with her sister later today; doing conditioning work with a friend to build strength back up.         Helpful cognitions:     Unhelpful and/or triggering or exacerbating factors:     Body-Mind Skills Education: uses tapping and EMDR     Relationships:  and children, extended family    End of Life and Advance Care Planning:     Main therapeutic interventions provided this session include:     Provide psychoeducation and Facilitate processing of thoughts and feelings    Plan:  Will return for psychotherapy with palliative care focus in 6 weeks          Time spent with patient/family:   45 (Start 2:30 end 31!5)    JAIDA Nguyen, [facility]   Palliative Care Clinical   Ph: [phone]      DO NOT SEND ANY LETTERS

## 2024-02-01 NOTE — LETTER
2/1/2024         RE: Dinora Mcghee  [address] [facility] [address]        Dear Colleague,    Thank you for referring your patient, Dinora Mcghee, to the [facility] [place]. Please see a copy of my visit note below.    Virtual Visit Details    Type of service:  Video Visit     Originating Location (pt. Location): Home    Distant Location (provider location):  On-site  Platform used for Video Visit: [employer]--         Palliative Care Clinical Social Work Return Appointment    PLEASE NOTE:  THIS IS A MENTAL HEALTH NOTE.  OTHER PROVIDERS VIEWING THIS NOTE SHOULD USE THIS ONLY FOR UNDERSTANDING THE CONTEXT OF THE PATIENT'S EXPERIENCE.  TOPICS DESCRIBED IN THIS NOTE SHOULD NOT BE REFERENCED TO THE PATIENT BY MEDICAL PROVIDERS.    Dinora Mcghee is a 58  year old woman with multiple medical conditions including chronic pancreatitis s/p TPAIT, long term complications from gastroparesis, constipation, GERD, migraines.  Had GJ placed Sept 2021, recovery was complicated and she ended up with a venting g-tube.  Due to complications with PO tolerance she had surgical placement of a J tube, and a resection of densely adhered small bowel.  Started on tube feedings, then on TPN.  TPN eventually stopped due to infection.  Has heavy symptom burden that includes chronic nausea and vomiting, fatigue, complex medical management. Seen today to address adjustment disorder with depressed mood and anxiety.        Mental Status Exam:(List all that apply)      Appearance: Appropriate      Eye Contact: Good       Orientation: Yes, x4      Mood: Normal       Affect: Appropriate       Thought Content: Clear         Thought Form: Logical      Psychomotor Behavior: Normal    Visit themes: Grief, loss, adjustment, ambiguous grief     Adjustment to Illness:  Feeling well today and has been feeling generally better.  Had an 11 day stretch with no vomiting! Continuing to adjust to symptoms but has been more able to leave  the house.  How hospitalizations since we last met!  Off TPN.       Mental Health (thoughts, feelings, actions, coping, and symptoms):     Working to connect with activities that give sense of meaning / purpose -- had friends over to teach them how to teresa, cared for a friend after her beloved dog ; Continues to grieve her identity attached to physical abilities and her identity as an athlete.     Discussed ambiguous grief for changes in health, that adjustment brings grief alongside it.         Helpful activities: time with family.  Meditating, knitting gifts for family members and friends, tapping.  Uses EMDR techniques. Able to go to [place] for a family wedding, has another trip coming up that she is looking forward to; Going to a painting class with her sister later today; doing conditioning work with a friend to build strength back up.         Helpful cognitions:     Unhelpful and/or triggering or exacerbating factors:     Body-Mind Skills Education: uses tapping and EMDR     Relationships:  and children, extended family    End of Life and Advance Care Planning:     Main therapeutic interventions provided this session include:     Provide psychoeducation and Facilitate processing of thoughts and feelings    Plan:  Will return for psychotherapy with palliative care focus in 6 weeks          Time spent with patient/family:   45 (Start 2:30 end 31!5)    JAIDA Nguyen, [facility]   Palliative Care Clinical   Ph: [phone]      DO NOT SEND ANY LETTERS                Again, thank you for allowing me to participate in the care of your patient.        Sincerely,        VASQUEZ Nguyen

## 2024-02-04 ASSESSMENT — HEADACHE IMPACT TEST (HIT 6)
WHEN YOU HAVE A HEADACHE HOW OFTEN DO YOU WISH YOU COULD LIE DOWN: ALWAYS
HIT6 TOTAL SCORE: 65
HOW OFTEN HAVE YOU FELT FED UP OR IRRITATED BECAUSE OF YOUR HEADACHES: SOMETIMES
HOW OFTEN DID HEADACHS LIMIT CONCENTRATION ON WORK OR DAILY ACTIVITY: SOMETIMES
WHEN YOU HAVE HEADACHES HOW OFTEN IS THE PAIN SEVERE: VERY OFTEN
HOW OFTEN HAVE YOU FELT TOO TIRED TO WORK BECAUSE OF YOUR HEADACHES: SOMETIMES
HOW OFTEN DO HEADACHES LIMIT YOUR DAILY ACTIVITIES: VERY OFTEN

## 2024-02-04 ASSESSMENT — MIGRAINE DISABILITY ASSESSMENT (MIDAS)
HOW MANY DAYS DID YOU NOT DO HOUSEWORK BECAUSE OF HEADACHES: 9
HOW MANY DAYS IN THE PAST 3 MONTHS HAVE YOU HAD A HEADACHE: 20
HOW OFTEN WERE SOCIAL ACTIVITIES MISSED DUE TO HEADACHES: 4
HOW MANY DAYS WAS HOUSEWORK PRODUCTIVITY CUT IN HALF DUE TO HEADACHES: 9
TOTAL SCORE: 40
ON A SCALE FROM 0-10 ON AVERAGE HOW PAINFUL WERE HEADACHES: 8
HOW MANY DAYS DID YOU MISS WORK OR SCHOOL BECAUSE OF HEADACHES: 18
HOW MANY DAYS WAS YOUR PRODUCTIVITY CUT IN HALF BECAUSE OF HEADACHES: 0

## 2024-02-05 ENCOUNTER — OFFICE VISIT (OUTPATIENT)
Dept: NEUROLOGY | Facility: CLINIC | Age: 59
End: 2024-02-05
Payer: COMMERCIAL

## 2024-02-05 VITALS
OXYGEN SATURATION: 97 % | HEART RATE: 111 BPM | BODY MASS INDEX: 19.58 KG/M2 | DIASTOLIC BLOOD PRESSURE: 83 MMHG | WEIGHT: 128.8 LBS | SYSTOLIC BLOOD PRESSURE: 135 MMHG

## 2024-02-05 DIAGNOSIS — G43.709 CHRONIC MIGRAINE W/O AURA W/O STATUS MIGRAINOSUS, NOT INTRACTABLE: Primary | ICD-10-CM

## 2024-02-05 DIAGNOSIS — G43.109 MIGRAINE WITH AURA AND WITHOUT STATUS MIGRAINOSUS, NOT INTRACTABLE: ICD-10-CM

## 2024-02-05 PROCEDURE — 99213 OFFICE O/P EST LOW 20 MIN: CPT | Performed by: NURSE PRACTITIONER

## 2024-02-05 RX ORDER — BUPRENORPHINE 2 MG/1
2 TABLET SUBLINGUAL 2 TIMES DAILY
COMMUNITY
Start: 2024-01-26

## 2024-02-05 ASSESSMENT — PAIN SCALES - GENERAL: PAINLEVEL: MODERATE PAIN (5)

## 2024-02-05 NOTE — LETTER
2/5/2024       RE: Dinora Mcghee  [address] [facility] [address]     Dear Colleague,    Thank you for referring your patient, Dinora Mcghee, to the [facility] [place] [facility]. Please see a copy of my visit note below.      Subjective  Dinora is a 58 year old, presenting for the following health issues:  RECHECK (Return headache)  Initial headache clinic visit in August 2022, see note for details  Hx s/p post pancreas Tx TPIAT in 2016, gastroparesis, G and J tubes   S/p pituitary adenoma and transnasal resection at the age of 26  Due to complex medical history and poor absorption it was recommended a trial of Botox for migraine prevention.  Patient started Botox in September 2022.  Since starting Botox headaches decreased.   Botox is still effective and helps with headache prevention. Does have wearing off phenomena -wakes up with right sided headache in the last 4 weeks. Can be wearing off or due to hypoglycemia per patient.   Nurtec tried 4 times and worked last time in addition with zolmitriptan and no side effects reported either to zolmitriptan or Nurtec.  Not a lot of headaches in 11 weeks and did not vomit   Activity helps with headaches   Heat and ice helmet =both help  In the last 3 weeks -needs to use rescue treatment.  Reports that she is in a sweet spot before Botox wears off-we discussed headache prevention with trying Nurtec every other day and almost 4 weeks before Botox injections.  The goal is to reduce headache frequency prior to Botox reinjection.   No other headache concerns reported today  Patient satisfied with her current headache prevention    Impression  Chronic migraines controlled with Botox for prevention and rescue as needed zolmitriptan and Nurtec.      Plan:  Continue with Botox   Nurtec as needed and to consider every other day in the last 4 weeks before re injections or monthly for migraine  prevention   Rescue treatment -zolmitriptan as needed   Follow up as scheduled for Botox reinjection    Preferred injectable or dissolvable medications due to poor got absorption.     Data reviewed  MR BRAIN W/O & W CONTRAST 8/29/2022 1:15 PM     Provided History: Positional headache; S/P pancreatic islet cell  transplantation (H); History of pituitary surgery.     ICD-10: Positional headache; S/P pancreatic islet cell transplantation  (H); History of pituitary surgery     Comparison: 1/19/2005.     Technique: Multiplanar T1-weighted, axial FLAIR, and susceptibility  images were obtained without intravenous contrast. Following  intravenous gadolinium-based contrast administration, axial  T2-weighted, diffusion, and T1-weighted images (in multiple planes)  were obtained.     Contrast dose: 6.5 mL Gadavist     Findings:  There is no mass effect, midline shift, or evidence of intracranial  hemorrhage. The ventricles are proportionate to the cerebral sulci.     Postcontrast images demonstrate no abnormal intracranial enhancing  lesions.  Partially empty sella. Residual adenoma with subchondral  No definite abnormality of the skull marrow signal is noted. The major  vascular intracranial flow-voids are present. The visualized portions  of paranasal sinuses, and mastoid air cells are relatively clear. The  orbits are grossly unremarkable.                                                                      Impression: No evidence of abnormal enhancing lesions intracranially.  No finding to explain patient's symptoms. Essentially a normal brain  MRI.             Objective   /83 (BP Location: Right arm, Patient Position: Sitting, Cuff Size: Adult Regular)   Pulse 111   Wt 58.4 kg (128 lb 12.8 oz)   SpO2 97%   BMI 19.58 kg/m    Body mass index is 19.58 kg/m .  Physical Exam   GENERAL: alert and no distress  EYES: Eyes grossly normal to inspection, PERRL and conjunctivae and sclerae normal  MS: no gross  musculoskeletal defects noted, no edema  NEURO: Normal strength and tone, mentation intact and speech normal  PSYCH: mentation appears normal, affect normal    The longitudinal plan of care for chronic migraine was addressed during this visit. Due to the added complexity in care, I will continue to support Dinora Mcghee in the subsequent management of this condition and with the ongoing continuity of care of this condition.    28 minutes spent on the date of the encounter doing face-to-face visit, chart  review, exam, results review,  meds review, treatment plan, documentation and further activities as noted above        Again, thank you for allowing me to participate in the care of your patient.      Sincerely,    NATALY Hoffman CNP

## 2024-02-05 NOTE — PATIENT INSTRUCTIONS
Plan:  Continue with Botox   Nurtec as needed and to consider every other day in the last 4 weeks before re injections or monthly for migraine prevention   Rescue treatment -zolmitriptan as needed   Follow up as scheduled

## 2024-02-12 ENCOUNTER — VIRTUAL VISIT (OUTPATIENT)
Dept: ONCOLOGY | Facility: CLINIC | Age: 59
End: 2024-02-12
Attending: SOCIAL WORKER
Payer: COMMERCIAL

## 2024-02-12 DIAGNOSIS — Z51.5 PALLIATIVE CARE PATIENT: ICD-10-CM

## 2024-02-12 DIAGNOSIS — F43.23 ADJUSTMENT DISORDER WITH MIXED ANXIETY AND DEPRESSED MOOD: Primary | ICD-10-CM

## 2024-02-12 PROCEDURE — 90834 PSYTX W PT 45 MINUTES: CPT | Mod: 95 | Performed by: SOCIAL WORKER

## 2024-02-12 NOTE — NURSING NOTE
Is the patient currently in the state of [place]? YES    Visit mode:VIDEO    If the visit is dropped, the patient can be reconnected by: VIDEO VISIT: Text to cell phone:   Telephone Information:   Mobile [phone]       Will anyone else be joining the visit? NO  (If patient encounters technical issues they should call [phone]    How would you like to obtain your AVS? MyChart    Are changes needed to the allergy or medication list? No    Reason for visit: EDWARDO FLETCHER

## 2024-02-12 NOTE — PROGRESS NOTES
"Virtual Visit Details    Type of service:  Video Visit     Originating Location (pt. Location): Home    Distant Location (provider location):  Off-site  Platform used for Video Visit: [facility]      Palliative [facility] Clinical Social Work Return Appointment    PLEASE NOTE:  THIS IS A MENTAL HEALTH NOTE.  OTHER PROVIDERS VIEWING THIS NOTE SHOULD USE THIS ONLY FOR UNDERSTANDING THE CONTEXT OF THE PATIENT'S EXPERIENCE.  TOPICS DESCRIBED IN THIS NOTE SHOULD NOT BE REFERENCED TO THE PATIENT BY MEDICAL PROVIDERS.    Dinora Mcghee is a 58  year old woman with multiple medical conditions including chronic pancreatitis s/p TPAIT, long term complications from gastroparesis, constipation, GERD, migraines.  Had GJ placed Sept 2021, recovery was complicated and she ended up with a venting g-tube.  Due to complications with PO tolerance she had surgical placement of a J tube, and a resection of densely adhered small bowel.  Started on tube feedings, then on TPN.  TPN eventually stopped due to infection.  Has heavy symptom burden that includes chronic nausea and vomiting, fatigue, complex medical management. Seen today to address adjustment disorder with depressed mood and anxiety.        Mental Status Exam:(List all that apply)      Appearance: Appropriate      Eye Contact: Good       Orientation: Yes, x4      Mood: Normal       Affect: Appropriate       Thought Content: Clear         Thought Form: Logical      Psychomotor Behavior: Normal    Visit themes: Grief, loss, adjustment, ambiguous grief     Adjustment to Illness:  Has not had a BM in a week.  Medical team aware.  Meeting with home care nurse later today. STarting pelvic floor therapy related to this.     COntinues to worry about going back to the ED after her last 3 experiences there (had to board in ED for several days) Gave number for patient relations.     Mental Health (thoughts, feelings, actions, coping, and symptoms):     Some anxiety spikes \"if there's anything on my " "calendar\" but seems able to work through this and is going out when she feels well enough to do so.  Also getting to medical appointments.     \"Had to stretch myself this weekend and ask for friends to help with rides\"    \"Still sign up for stuff, if you can't make it you can't make it\"  Looking forward to time with husbands family   Trying to help while also keeping boundaries with family dynamics as her mother is recovering well from her stroke and Dinora's sister is providing full time care to their mother.      STarted journaling - prompts; \"brain dump\"; and gratitude     Has trips that she is looking forward to including trip to [place] to see her son compete in an endurance event.     Helpful activities: time with family.  Meditating, knitting gifts for family members and friends, tapping.  Uses EMDR techniques. Trips that she is looking forward to.         Helpful cognitions:     Unhelpful and/or triggering or exacerbating factors:     Body-Mind Skills Education: uses tapping and EMDR     Relationships:  and children, extended family    End of Life and Advance Care Planning:     Main therapeutic interventions provided this session include:     Provide psychoeducation and Facilitate processing of thoughts and feelings    Plan:  Will return for psychotherapy with palliative care focus in 2 weeks        Time spent with patient/family:   46 (Start 2:00 end 2:46)    JAIDA Nguyen, [facility]   Palliative Care Clinical   Ph: [phone]      DO NOT SEND ANY LETTERS              "

## 2024-02-12 NOTE — LETTER
2/12/2024         RE: Dinora Mcghee  [address] [facility] [address]        Dear Colleague,    Thank you for referring your patient, Dinora Mcghee, to the [facility]. Please see a copy of my visit note below.    Virtual Visit Details    Type of service:  Video Visit     Originating Location (pt. Location): Home    Distant Location (provider location):  Off-site  Platform used for Video Visit: [facility]      Palliative [facility] Clinical Social Work Return Appointment    PLEASE NOTE:  THIS IS A MENTAL HEALTH NOTE.  OTHER PROVIDERS VIEWING THIS NOTE SHOULD USE THIS ONLY FOR UNDERSTANDING THE CONTEXT OF THE PATIENT'S EXPERIENCE.  TOPICS DESCRIBED IN THIS NOTE SHOULD NOT BE REFERENCED TO THE PATIENT BY MEDICAL PROVIDERS.    Dinora Mcghee is a 58  year old woman with multiple medical conditions including chronic pancreatitis s/p TPAIT, long term complications from gastroparesis, constipation, GERD, migraines.  Had GJ placed Sept 2021, recovery was complicated and she ended up with a venting g-tube.  Due to complications with PO tolerance she had surgical placement of a J tube, and a resection of densely adhered small bowel.  Started on tube feedings, then on TPN.  TPN eventually stopped due to infection.  Has heavy symptom burden that includes chronic nausea and vomiting, fatigue, complex medical management. Seen today to address adjustment disorder with depressed mood and anxiety.        Mental Status Exam:(List all that apply)      Appearance: Appropriate      Eye Contact: Good       Orientation: Yes, x4      Mood: Normal       Affect: Appropriate       Thought Content: Clear         Thought Form: Logical      Psychomotor Behavior: Normal    Visit themes: Grief, loss, adjustment, ambiguous grief     Adjustment to Illness:  Has not had a BM in a week.  Medical team aware.  Meeting with home care nurse later today. STarting pelvic floor therapy related to this.     COntinues to worry  "about going back to the ED after her last 3 experiences there (had to board in ED for several days) Gave number for patient relations.     Mental Health (thoughts, feelings, actions, coping, and symptoms):     Some anxiety spikes \"if there's anything on my calendar\" but seems able to work through this and is going out when she feels well enough to do so.  Also getting to medical appointments.     \"Had to stretch myself this weekend and ask for friends to help with rides\"    \"Still sign up for stuff, if you can't make it you can't make it\"  Looking forward to time with husbands family   Trying to help while also keeping boundaries with family dynamics as her mother is recovering well from her stroke and Dinora's sister is providing full time care to their mother.      STarted journaling - prompts; \"brain dump\"; and gratitude     Has trips that she is looking forward to including trip to [place] to see her son compete in an endurance event.     Helpful activities: time with family.  Meditating, knitting gifts for family members and friends, tapping.  Uses EMDR techniques. Trips that she is looking forward to.         Helpful cognitions:     Unhelpful and/or triggering or exacerbating factors:     Body-Mind Skills Education: uses tapping and EMDR     Relationships:  and children, extended family    End of Life and Advance Care Planning:     Main therapeutic interventions provided this session include:     Provide psychoeducation and Facilitate processing of thoughts and feelings    Plan:  Will return for psychotherapy with palliative care focus in 2 weeks        Time spent with patient/family:   46 (Start 2:00 end 2:46)    JAIDA Nguyen, [facility]   Palliative Care Clinical   Ph: [phone]      DO NOT SEND ANY LETTERS                Again, thank you for allowing me to participate in the care of your patient.        Sincerely,        VASQUEZ Nguyen  "

## 2024-02-13 ENCOUNTER — MEDICAL CORRESPONDENCE (OUTPATIENT)
Dept: INTERNAL MEDICINE | Facility: CLINIC | Age: 59
End: 2024-02-13
Payer: COMMERCIAL

## 2024-02-13 ENCOUNTER — TELEPHONE (OUTPATIENT)
Dept: ONCOLOGY | Facility: CLINIC | Age: 59
End: 2024-02-13
Payer: COMMERCIAL

## 2024-02-13 ENCOUNTER — TRANSFERRED RECORDS (OUTPATIENT)
Dept: HEALTH INFORMATION MANAGEMENT | Facility: CLINIC | Age: 59
End: 2024-02-13
Payer: COMMERCIAL

## 2024-02-13 NOTE — PROGRESS NOTES
CLINICAL NUTRITION SERVICES     Reason for Contact: Questions from Oncology Distress Screening  This patient has scored >= 8 on Nutrition question 1 and/or 2 on the Oncology Distress Screening questionaire. Nutritionist Referral recommended. See Onc Distress Screening Flowsheet     Action: None at this time. Patient follows with dietitian from [facility] for tube feeds.      Follow up: Wait for a return phone call.    Tania Monsivais RD, LD  [phone]

## 2024-02-14 ENCOUNTER — DOCUMENTATION ONLY (OUTPATIENT)
Dept: INTERNAL MEDICINE | Facility: CLINIC | Age: 59
End: 2024-02-14
Payer: COMMERCIAL

## 2024-02-14 NOTE — PROGRESS NOTES
Type of Form Received:     Form Received (Date) 2/14/24   Form Filled out Yes 2/15/24   Placed in provider folder Yes

## 2024-02-15 ENCOUNTER — DOCUMENTATION ONLY (OUTPATIENT)
Dept: GASTROENTEROLOGY | Facility: CLINIC | Age: 59
End: 2024-02-15

## 2024-02-15 ENCOUNTER — PATIENT OUTREACH (OUTPATIENT)
Dept: INTERNAL MEDICINE | Facility: CLINIC | Age: 59
End: 2024-02-15
Payer: COMMERCIAL

## 2024-02-15 ENCOUNTER — MYC MEDICAL ADVICE (OUTPATIENT)
Dept: GASTROENTEROLOGY | Facility: CLINIC | Age: 59
End: 2024-02-15

## 2024-02-15 ENCOUNTER — PATIENT OUTREACH (OUTPATIENT)
Dept: GASTROENTEROLOGY | Facility: CLINIC | Age: 59
End: 2024-02-15

## 2024-02-15 ENCOUNTER — CARE COORDINATION (OUTPATIENT)
Dept: GASTROENTEROLOGY | Facility: CLINIC | Age: 59
End: 2024-02-15

## 2024-02-15 DIAGNOSIS — R11.2 NAUSEA AND VOMITING, UNSPECIFIED VOMITING TYPE: ICD-10-CM

## 2024-02-15 DIAGNOSIS — K31.84 GASTROPARESIS: Primary | ICD-10-CM

## 2024-02-15 DIAGNOSIS — K59.04 CHRONIC IDIOPATHIC CONSTIPATION: ICD-10-CM

## 2024-02-15 DIAGNOSIS — K59.01 SLOW TRANSIT CONSTIPATION: ICD-10-CM

## 2024-02-15 DIAGNOSIS — R10.84 ABDOMINAL PAIN, GENERALIZED: ICD-10-CM

## 2024-02-15 NOTE — PROGRESS NOTES
"Clinic Care Coordination Contact  Follow Up Progress Note      Assessment:   RN called and spoke with patient who reports really feeling terrible for the past 36 hours. She reports having esophagus burn from vomiting, cannot get anything to flow in stomach. She reports that she usually goes without a BM for 7-10 days, but this has been longer. She has taken a suppository, enema, lay on the ground and do cat/cow pose and down dog and there is no sensation whatsoever. She reported that her HC nurse has come in, listened to the 4 quadrants, and she has issues with her LLQ. She reports she is even considering doing the surgery again, because she is just surviving, \"not living\". She is tearful on the phone about upcoming vacation, where she reports much of her family is medical staff (doctor, pharamcist, 3 PT and 3 nurses) and she will be in [place] and will be driving there. She feels that she can do that. Patient discusses that she cannot go to the ER as she does not trust the care she will receive there. She has a lot of trauma experiences towards being in the waiting room/hallway of the ER and it being loud, her getting intense migraines, and just waiting for \"medication on a timer system\". She states her last 3 admissions were nightmares. She reports she is getting her 90mL/hr tube feedings currently, but she doesn't know where the feedings are going.     Other than this- she discusses that she will start pelvic floor therapy (manual therapy to get the rectum loose) on 27th, sees PCP and GI doctors on 3/6, she received the vaccine that she was supposed to get. She feels she does not have any other health concerns to address at this time.       Care Gaps:    Health Maintenance Due   Topic Date Due    NICOTINE/TOBACCO CESSATION COUNSELING Q 1 YR  Never done    DIABETIC FOOT EXAM  Never done    URINE DRUG SCREEN  Never done    DEPRESSION ACTION PLAN  Never done    MIGRAINE ACTION PLAN  Never done    EYE EXAM  " 02/19/2020    YEARLY PREVENTIVE VISIT  03/06/2024    LIPID  03/06/2024    MICROALBUMIN  03/06/2024       Postponed to 3/6/24     Care Plans  Care Plan: Health Maintenance       Problem: Health Maintenance Due or Overdue       Long-Range Goal: Become up-to-date with health maintenance visit(s)       Start Date: 12/5/2023    This Visit's Progress: On track Recent Progress: On track    Priority: High    Note:     Barriers: patient has complex illness and medical care, collaborates with various specialty teams  Strengths: patient has a great relationship with providers and members of care team, keeps healthcare management organized  Patient expressed understanding of goal: yes  Action steps to achieve this goal:  1. I will attend all of my upcoming appointments with my specialist and primary care provider.   2. I will communicate with my RN CC if there are further needs I have for assistance and support with primary care.   3. I will work with my RN CC on identifying all of the goals my providers have for helping me reach my health care needs.     RN goals:  RN to look at pt's care plans from previous visit notes with providers. RN to help patient identify the items that have yet to be completed and stay on top of health care goals.   RN to collaborate with Conchis RN CC for transplant and GI.                                   Intervention/Education provided during outreach: Patient states understanding. Patient has no further questions or concerns regarding above assessment. Patient is aware to call the clinic and reach out to the care team with any further questions.         Plan:   RN sent message to Kenya Victor RN CC with GI regarding patient's symptoms. RN informed patient that Kenya could connect with GI providers to determine a plan for the symptoms that she is experiencing. Per chart review, Kenya has called and spoken with patient and they are working on a plan for her care.   Care Coordinator will  follow up in 4 weeks.     DARIELA ADAMS RN on 2/15/2024 at 12:02 PM

## 2024-02-15 NOTE — TELEPHONE ENCOUNTER
"Dr Park recommending venting G tube, home fluids and ER visit.    Confirmed that patient is venting her Gtube, holding feeds as needed, and is getting home fluids and IV antiemetics. Refuses ER visit \"its not a safe place, I cannot go there\" Will not go to local or [facility] ER. States she will stay at home and \"if I pass out or something and Shaan brings be in to the ER, we'll see what happens\"    Discussed complexity of patient symptoms. Will have SBFT on 3/8, currently scheduled after visit with Birgit and Xavier consult. Will work with [facility] office to retime clinic so it can occur after SBFT.    Dr Taveras team also updated on patient symptoms, refusal to go to ER    ML  "

## 2024-02-15 NOTE — PROGRESS NOTES
Per request fax CT order to [facility] in [place] on 2/15 at 4:25pm.    [facility]  [address] [place] [address]., [place] [address]  Appointments: [phone], [phone]  Fax: [phone]    Yolanda Schmidt

## 2024-02-15 NOTE — PROGRESS NOTES
Received a message from from the GI team regarding the patients  symptoms.   Spoke with Dinora and she is not feeling well and she has tried the bowel program that was discussed during the appointment.   Discussed the patients symptoms with Dr. Genao and she recommended a CT and Dinora agreed to it.   Dinora will only have it done close to her home at [facility] in [place].  Order placed and faxed   Update Dinora that the order will be faxed by the end of the day.   She will call schedule     Discussed keeping the appointment on 3-6 with Dr. Genao then having another appointment on 3-13 after the small bowel follow through and Dinora agreed to this.     The appointment on 3-13 at 3pm is with Dr. Engel and Dr. Birgit Park's team updated on the above

## 2024-02-15 NOTE — TELEPHONE ENCOUNTER
"Called patient to check on symptoms. Per homecare RN, there's concern for bowel obstruction but patient won't come to ER related to prior experiences.    Patient says \"I'm so uncomfortable\",states she says she feels she has a bowel obstruction in her LLQ, does enema, nothing comes out, this is week 3 of these symptoms, has had 2 BM's in 3 weeks. Nauseated, bile coming up constantly. \"Nothing working like its supposed to\". Patient states stool isn't making it into her colon/rectum. She had pelvic floor work up on 1/22/24, dx obstructive defecation and slow transit constipation- notes in Media tab.     Patient states repeatedly that she'd \"rather die at home because she can't do ER again-\" her therapist has tried to help her with trauma of prior ER experiences. Her  keeps offering to bring her into ER. Discussed presenting to local ER for assessment, pt declines that too. Patient refused ER multiple times related to seeing 2 people die the last time she was there, having 1 bathroom for 30 people, the bright lights and anxiety provoking experience of the ER.     Text message sent to Dr Park about patients comments/recommendations for next steps.       "

## 2024-02-16 ENCOUNTER — PATIENT OUTREACH (OUTPATIENT)
Dept: GASTROENTEROLOGY | Facility: CLINIC | Age: 59
End: 2024-02-16
Payer: COMMERCIAL

## 2024-02-16 ENCOUNTER — TELEPHONE (OUTPATIENT)
Dept: GASTROENTEROLOGY | Facility: CLINIC | Age: 59
End: 2024-02-16
Payer: COMMERCIAL

## 2024-02-16 ENCOUNTER — DOCUMENTATION ONLY (OUTPATIENT)
Dept: GASTROENTEROLOGY | Facility: CLINIC | Age: 59
End: 2024-02-16
Payer: COMMERCIAL

## 2024-02-16 DIAGNOSIS — K59.04 CHRONIC IDIOPATHIC CONSTIPATION: ICD-10-CM

## 2024-02-16 DIAGNOSIS — K31.84 GASTROPARESIS: ICD-10-CM

## 2024-02-16 DIAGNOSIS — K59.04 CHRONIC IDIOPATHIC CONSTIPATION: Primary | ICD-10-CM

## 2024-02-16 DIAGNOSIS — K59.00 CONSTIPATION: Primary | ICD-10-CM

## 2024-02-16 NOTE — PROGRESS NOTES
Per request of MONICA Ramirez, Gastrograffin Enema order was faxed to [facility]    Faxed at 1:40pm on 2/16.     [facility] and [address] [address]  Appointments:[phone]  [facility] Fax: [phone]    RN notified    Yolanda Schmidt

## 2024-02-16 NOTE — PROGRESS NOTES
Received a call from [facility] in [place] and they do not do the gastrografin enemas   Updated Dinora and provided the number to call to schedule

## 2024-02-16 NOTE — TELEPHONE ENCOUNTER
Hello,    We received a new rx for a compounded enema without the name of the drug you are looking for us to compound. We also need to know the quantity in milliliters that the patient should use each time and the total quantity in milliliters that we should dispense to the patient.     Please resend the prescription or call us with questions/clarification at [phone].    Thank you,    Cecille Zafar CPhT, YARELIS    [facility]  [address] [place] [address]   [email or web address]  [email or web address]   Phone: [phone]  Fax: [phone]

## 2024-02-16 NOTE — TELEPHONE ENCOUNTER
Hello,    We received a new rx for a compounded enema without the name of the drug you are looking for us to compound. Please resend the rx or call us at [phone].    Thank you,    Cecille Zafar CPhT, YARELIS    [facility]  [address] [place] [address]   [email or web address]  [email or web address]   Phone: [phone]  Fax: [phone]

## 2024-02-16 NOTE — PROGRESS NOTES
Called to request images be pushed to [facility].    Images Requested:  -- CT Abdomen Pelvis with IV Contrast (02/15/2024 10:06 PM CST)    Facility Information:  [facility]   [address] [place] [address]   Phone #: [phone]     [facility]  Health Information Management Services  Release of Information  [address] [place] [address]  Phone #: [phone]  Fax #: [phone]  Email: [email or web address]      SK

## 2024-02-16 NOTE — TELEPHONE ENCOUNTER
Called to check in with Dinora. She ended up at [facility] last night, CT and labs done. Will get CT images and update team    ML

## 2024-02-16 NOTE — PROGRESS NOTES
Spoke with Dinora and she good results for the soap enema given in the ED.  Dinora stated that he has a had time telling if all the stool is out.     Discussed this with Dr. Genao and she is recommending a gastrografin enema once then if effective could do every 8 weeks   Also discussed pink lady enema BID  Fleet enema up to QID   Glycerin supp every day to BID    Dinora would like to see if the GGE could be done at [facility] in [place].    Call placed and they can do it but need the order. Order placed and to be faxed to [facility].     Order placed for pink Lady enema

## 2024-02-19 ENCOUNTER — HOSPITAL ENCOUNTER (OUTPATIENT)
Dept: GENERAL RADIOLOGY | Facility: CLINIC | Age: 59
Discharge: HOME | End: 2024-02-19
Attending: INTERNAL MEDICINE | Admitting: INTERNAL MEDICINE
Payer: COMMERCIAL

## 2024-02-19 DIAGNOSIS — K59.04 CHRONIC IDIOPATHIC CONSTIPATION: ICD-10-CM

## 2024-02-19 PROCEDURE — 74283 THER NMA RDCTJ INTUS/OBSTRCJ: CPT | Mod: 26 | Performed by: RADIOLOGY

## 2024-02-19 PROCEDURE — 74283 THER NMA RDCTJ INTUS/OBSTRCJ: CPT

## 2024-02-19 RX ADMIN — DIATRIZOATE MEGLUMINE AND DIATRIZOATE SODIUM 600 ML: 660; 100 SOLUTION ORAL; RECTAL at 08:30

## 2024-02-19 NOTE — TELEPHONE ENCOUNTER
Spoke with the pharmacist and clarified for the pink lady enema   The order is placed for 60 each so they will send out the standard formulation for each enema then call the patient to set up delivery

## 2024-02-20 ENCOUNTER — TRANSFERRED RECORDS (OUTPATIENT)
Dept: MULTI SPECIALTY CLINIC | Facility: CLINIC | Age: 59
End: 2024-02-20

## 2024-02-21 DIAGNOSIS — K59.01 SLOW TRANSIT CONSTIPATION: ICD-10-CM

## 2024-02-26 ENCOUNTER — VIRTUAL VISIT (OUTPATIENT)
Dept: ONCOLOGY | Facility: CLINIC | Age: 59
End: 2024-02-26
Attending: SOCIAL WORKER
Payer: COMMERCIAL

## 2024-02-26 ENCOUNTER — MYC MEDICAL ADVICE (OUTPATIENT)
Dept: INTERNAL MEDICINE | Facility: CLINIC | Age: 59
End: 2024-02-26

## 2024-02-26 DIAGNOSIS — Z51.5 PALLIATIVE CARE PATIENT: ICD-10-CM

## 2024-02-26 DIAGNOSIS — F43.23 ADJUSTMENT DISORDER WITH MIXED ANXIETY AND DEPRESSED MOOD: Primary | ICD-10-CM

## 2024-02-26 PROCEDURE — 90834 PSYTX W PT 45 MINUTES: CPT | Mod: 95 | Performed by: SOCIAL WORKER

## 2024-02-26 NOTE — PROGRESS NOTES
"Virtual Visit Details    Type of service:  Video Visit     Originating Location (pt. Location): Home    Distant Location (provider location):  On-site  Platform used for Video Visit: [facility]        Palliative Care Clinical Social Work Return Appointment    PLEASE NOTE:  THIS IS A MENTAL HEALTH NOTE.  OTHER PROVIDERS VIEWING THIS NOTE SHOULD USE THIS ONLY FOR UNDERSTANDING THE CONTEXT OF THE PATIENT'S EXPERIENCE.  TOPICS DESCRIBED IN THIS NOTE SHOULD NOT BE REFERENCED TO THE PATIENT BY MEDICAL PROVIDERS.    Dinora Mcghee is a 58  year old woman with multiple medical conditions including chronic pancreatitis s/p TPAIT, long term complications from gastroparesis, constipation, GERD, migraines.  Had GJ placed Sept 2021, recovery was complicated and she ended up with a venting g-tube.  Due to complications with PO tolerance she had surgical placement of a J tube, and a resection of densely adhered small bowel.  Started on tube feedings, then on TPN.  TPN eventually stopped due to infection.  Has heavy symptom burden that includes chronic nausea and vomiting, fatigue, complex medical management.  Has had multiple ER visits for GI and Bowel concerns.  Seen today to address adjustment disorder with depressed mood and anxiety.         Mental Status Exam:(List all that apply)      Appearance: Appropriate      Eye Contact: Good       Orientation: Yes, x4      Mood: Normal       Affect: Appropriate       Thought Content: Clear         Thought Form: Logical      Psychomotor Behavior: Normal    Visit themes: Grief, loss, adjustment, ambiguous grief     Adjustment to Illness:  Went to the ED in [place] a few days after our last session.  \"They said I need a better bowel regimen, but I'm already doing everything I can\" including multiple enemas a day\"   Meeting with GI next week \"He says we're running out of things to try\"  Wondering about what it looks like if decision made to step away from bowel regimen and from tube " "feeds  \"Don't know if I want to keep living like this\" COntinues to have good and bad days, feels like stretches between good days are getting longer, more difficult.   Worries about family   \"Managing this body is a full time job that's going nowwhere\"     Mental Health (thoughts, feelings, actions, coping, and symptoms):     's Family reunion was very positive experience despite symptoms \"I felt like I was a part of things\" and family was able to make space for Dinora to do treatments.  In addition several family members have medical backgrounds and so were able to talk with Dinora about her health without as much explanation being needed.   Family of origin making plans to move to [place], mixed feelings about this as they bring complex family dynamics, not able to be very supportive medically or emotionally     Heavy symptom burden today so mood also low  Demoralized \"I'm doing everythign I'm supposed to do\"  No expressions of guilt \"just bad gut genetics\"   Overwhelmed, unsure of how to move forward with her plan of care and current quality of life.     Helpful activities: time with family.  Meditating, knitting gifts for family members and friends, tapping.  Uses EMDR techniques. Trips that she is looking forward to.         Helpful cognitions:     Unhelpful and/or triggering or exacerbating factors:     Body-Mind Skills Education: uses tapping and EMDR     Relationships:  and children, extended family    End of Life and Advance Care Planning:     Main therapeutic interventions provided this session include:     Provide psychoeducation, Facilitate processing of thoughts and feelings, Facilitate goals of care discussion, and Facilitate structured problem solving    Plan:  Will return for psychotherapy with palliative care focus in 2 weeks        Time spent with patient/family:   46 (Start 2:00 end 2:46)    JAIDA Nguyen, [facility]   Palliative Care Clinical   Ph: " [phone]      DO NOT SEND ANY LETTERS

## 2024-02-26 NOTE — NURSING NOTE
Is the patient currently in the state of [place]? YES    Visit mode:VIDEO    If the visit is dropped, the patient can be reconnected by: VIDEO VISIT: Text to cell phone:   Telephone Information:   Mobile [phone]       Will anyone else be joining the visit? NO  (If patient encounters technical issues they should call [phone]    How would you like to obtain your AVS? MyChart    Are changes needed to the allergy or medication list? No    Reason for visit: EDWARDO FLETCHER

## 2024-02-26 NOTE — LETTER
"    2/26/2024         RE: Dinora Mcghee  [address] [facility] [address]        Dear Colleague,    Thank you for referring your patient, Dinora Mcghee, to the [facility]. Please see a copy of my visit note below.    Virtual Visit Details    Type of service:  Video Visit     Originating Location (pt. Location): Home    Distant Location (provider location):  On-site  Platform used for Video Visit: [facility]        Palliative Care Clinical Social Work Return Appointment    PLEASE NOTE:  THIS IS A MENTAL HEALTH NOTE.  OTHER PROVIDERS VIEWING THIS NOTE SHOULD USE THIS ONLY FOR UNDERSTANDING THE CONTEXT OF THE PATIENT'S EXPERIENCE.  TOPICS DESCRIBED IN THIS NOTE SHOULD NOT BE REFERENCED TO THE PATIENT BY MEDICAL PROVIDERS.    Dinora Mcghee is a 58  year old woman with multiple medical conditions including chronic pancreatitis s/p TPAIT, long term complications from gastroparesis, constipation, GERD, migraines.  Had GJ placed Sept 2021, recovery was complicated and she ended up with a venting g-tube.  Due to complications with PO tolerance she had surgical placement of a J tube, and a resection of densely adhered small bowel.  Started on tube feedings, then on TPN.  TPN eventually stopped due to infection.  Has heavy symptom burden that includes chronic nausea and vomiting, fatigue, complex medical management.  Has had multiple ER visits for GI and Bowel concerns.  Seen today to address adjustment disorder with depressed mood and anxiety.         Mental Status Exam:(List all that apply)      Appearance: Appropriate      Eye Contact: Good       Orientation: Yes, x4      Mood: Normal       Affect: Appropriate       Thought Content: Clear         Thought Form: Logical      Psychomotor Behavior: Normal    Visit themes: Grief, loss, adjustment, ambiguous grief     Adjustment to Illness:  Went to the ED in [place] a few days after our last session.  \"They said I need a better bowel " "regimen, but I'm already doing everything I can\" including multiple enemas a day\"   Meeting with GI next week \"He says we're running out of things to try\"  Wondering about what it looks like if decision made to step away from bowel regimen and from tube feeds  \"Don't know if I want to keep living like this\" COntinues to have good and bad days, feels like stretches between good days are getting longer, more difficult.   Worries about family   \"Managing this body is a full time job that's going nowwhere\"     Mental Health (thoughts, feelings, actions, coping, and symptoms):     's Family reunion was very positive experience despite symptoms \"I felt like I was a part of things\" and family was able to make space for Dinora to do treatments.  In addition several family members have medical backgrounds and so were able to talk with Dinora about her health without as much explanation being needed.   Family of origin making plans to move to [place], mixed feelings about this as they bring complex family dynamics, not able to be very supportive medically or emotionally     Heavy symptom burden today so mood also low  Demoralized \"I'm doing everythign I'm supposed to do\"  No expressions of guilt \"just bad gut genetics\"   Overwhelmed, unsure of how to move forward with her plan of care and current quality of life.     Helpful activities: time with family.  Meditating, knitting gifts for family members and friends, tapping.  Uses EMDR techniques. Trips that she is looking forward to.         Helpful cognitions:     Unhelpful and/or triggering or exacerbating factors:     Body-Mind Skills Education: uses tapping and EMDR     Relationships:  and children, extended family    End of Life and Advance Care Planning:     Main therapeutic interventions provided this session include:     Provide psychoeducation, Facilitate processing of thoughts and feelings, Facilitate goals of care discussion, and Facilitate structured " problem solving    Plan:  Will return for psychotherapy with palliative care focus in 2 weeks        Time spent with patient/family:   46 (Start 2:00 end 2:46)    JAIDA Nguyen, [facility]   Palliative Care Clinical   Ph: [phone]      DO NOT SEND ANY LETTERS                Again, thank you for allowing me to participate in the care of your patient.        Sincerely,        VASQUEZ Nguyen

## 2024-02-27 ENCOUNTER — DOCUMENTATION ONLY (OUTPATIENT)
Dept: INTERNAL MEDICINE | Facility: CLINIC | Age: 59
End: 2024-02-27
Payer: COMMERCIAL

## 2024-02-27 RX ORDER — SCOLOPAMINE TRANSDERMAL SYSTEM 1 MG/1
PATCH, EXTENDED RELEASE TRANSDERMAL
Qty: 10 PATCH | Refills: 11 | Status: SHIPPED | OUTPATIENT
Start: 2024-02-27

## 2024-02-27 NOTE — TELEPHONE ENCOUNTER
scopolamine (TRANSDERM) 1 MG/3DAYS 72 hr patch   10 patch 11 5/12/2023       Last Office Visit : 12-  Future Office visit:  3-6-2024

## 2024-02-27 NOTE — PROGRESS NOTES
Type of Form Received:     Form Received (Date) 2/27/24   Form Filled out Yes, faxed 2/29   Placed in provider folder Yes

## 2024-02-28 DIAGNOSIS — Z53.9 DIAGNOSIS NOT YET DEFINED: Primary | ICD-10-CM

## 2024-02-28 ASSESSMENT — HEADACHE IMPACT TEST (HIT 6)
WHEN YOU HAVE HEADACHES HOW OFTEN IS THE PAIN SEVERE: ALWAYS
HOW OFTEN HAVE YOU FELT TOO TIRED TO WORK BECAUSE OF YOUR HEADACHES: SOMETIMES
HOW OFTEN DID HEADACHS LIMIT CONCENTRATION ON WORK OR DAILY ACTIVITY: SOMETIMES
HOW OFTEN HAVE YOU FELT FED UP OR IRRITATED BECAUSE OF YOUR HEADACHES: SOMETIMES
HOW OFTEN DO HEADACHES LIMIT YOUR DAILY ACTIVITIES: SOMETIMES
WHEN YOU HAVE A HEADACHE HOW OFTEN DO YOU WISH YOU COULD LIE DOWN: SOMETIMES
HIT6 TOTAL SCORE: 63

## 2024-02-29 ENCOUNTER — OFFICE VISIT (OUTPATIENT)
Dept: NEUROLOGY | Facility: CLINIC | Age: 59
End: 2024-02-29
Payer: COMMERCIAL

## 2024-02-29 DIAGNOSIS — G43.709 CHRONIC MIGRAINE W/O AURA W/O STATUS MIGRAINOSUS, NOT INTRACTABLE: Primary | ICD-10-CM

## 2024-02-29 PROCEDURE — 64615 CHEMODENERV MUSC MIGRAINE: CPT | Performed by: NURSE PRACTITIONER

## 2024-02-29 NOTE — PROGRESS NOTES
"PROCEDURE NOTE:     [facility]  Botulinum Toxin Procedure     Headache Neurology     02/29/24     Procedure:  OnabotulinumtoxinA injections for chronic migraine  Indication:  Chronic migraine     Dinora suffers from severe intractable headaches.  She was referred by for onabotulinumtoxinA injections for headache.  Risks, benefits, and alternatives were discussed.  All questions were answered and consent given.  She decided to proceed with the injections.      Headache history, from initial consult note: See Initial Headache Clinic visit on 8/3/2022 for details     Prior to initiation of botulinum toxin injections, Ms. Mcghee reported 13-15 headache days per month, with 3-4 severe headache days per month. Her headaches are quite disabling and often interfere with her ability to function normally.     Date of last injections:  12/4/2023  Ms. Mcghee reports 1 headache day every 10 days per month currently and more headaches in the 3 weeks leading to Botox -11 headache days in 21 day, with none in the 10 weeks but 6 really severe headache days in the past 3 weeks .  She has noticed a wearing off phenomenon prior to this round of botulinum toxin injections, lasting 2-3 weeks.     Botulinum toxin injections have improved their functioning: able to read better, walking and not in bed      She has attempted other migraine prophylactic treatments in the past, which have included: Amitriptyline for 15 years and stopped   Topiramate  Sumatriptan   lyrica-\"brain fog\"  depakote -allergies  Compazine +benedryl  Promethazine IV three times per week for   scopalamine patch     She currently takes amitriptyline, promethazine, zolmitriptine for headache prevention.     Ms. Skinners pain was assessed prior to the procedure.  She rated her pain today as 6 out of 10.     Procedural Pause: Procedural pause was conducted to verify correct patient identity, procedure to be performed, correct side and site, correct patient position, " and special requirements. Appropriate hand hygiene was utilized, and each injection site was prepped with alcohol wipe or Chloraprep swab.      Procedure Details: 200 units of onabotulinumtoxinA was diluted in 4 mL 0.9% normal saline. A total of 155 units of onabotulinumtoxinA were injected using 30 gauge 0.5 in needles into the muscles listed below. 45 units of onabotulinumtoxinA were wasted.         GOAL OF PROCEDURE:  The goal of this procedure is to decrease pain and enhance functional independence.     CONSENT:  The risks, benefits, and treatment options were discussed with the patient who agreed to proceed.     Written consent was obtained      EQUIPMENT USED:  Needles-30 gauge, 0.5 inches for injections  Four 1-ml tuberculin syringes for injections  One sodium chloride 10 ml vial preservative free  Alcohol swabs     SKIN PREPARATION:  Skin preparation was performed using an alcohol wipe.        AREA/MUSCLE INJECTED:  155 units of Botox  Right upper Trapezius (upper cervical) - 5 units of Botox at 3 site/s.   Left upper Trapezius (upper cervical) - 5 units of Botox at 3 site/s.      Right cervical paraspinals - 5 units of Botox at 2 site/s.   Left cervical paraspinals - 5 units of Botox at 2 site/s.      Left occipitalis - 5 units of Botox at 3 site/s.  Right occipitalis -5 units of Botox at 3 site/s     Right Frontalis - 5 units of Botox at 2 site/s.  Left Frontalis - 5 units of Botox at 2 site/s.     Right Temporalis - 5 units of Botox at 4 site/s.  Left Temporalis - 5 units of Botox at 4 site/s.     Right  - 5 units of Botox at 1 site/s.              Left  - 5 units of Botox at 1 site/s.     Procerus - 5 units of Botox at 1 site/s.     RESPONSE TO PROCEDURE:  tolerated the procedure well and there were no immediate complications.  Patient was allowed to recover for an appropriate period of time and was discharged home in stable condition.    Left PICC -patient asked to take a look at her  PICC site. Painful when bumps into something but otherwise no pain, no erythema, edema. 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 She will observed and goes to ED if any problems      FOLLOW UP:     Repeat Botox injections in 12 weeks        This procedure was performed under a hospital privileging agreement with Dr. Finn اللعي, APRN, [facility]

## 2024-02-29 NOTE — LETTER
"2/29/2024       RE: Dinora Mcghee  [address] [facility] [address]     Dear Colleague,    Thank you for referring your patient, Dinora Mcghee, to the [facility] [place] at [facility]. Please see a copy of my visit note below.    PROCEDURE NOTE:     [facility]  Botulinum Toxin Procedure     Headache Neurology     02/29/24     Procedure:  OnabotulinumtoxinA injections for chronic migraine  Indication:  Chronic migraine     Dinora suffers from severe intractable headaches.  She was referred by for onabotulinumtoxinA injections for headache.  Risks, benefits, and alternatives were discussed.  All questions were answered and consent given.  She decided to proceed with the injections.      Headache history, from initial consult note: See Initial Headache Clinic visit on 8/3/2022 for details     Prior to initiation of botulinum toxin injections, Ms. Mcghee reported 13-15 headache days per month, with 3-4 severe headache days per month. Her headaches are quite disabling and often interfere with her ability to function normally.     Date of last injections:  12/4/2023  Ms. Mcghee reports 1 headache day every 10 days per month currently and more headaches in the 3 weeks leading to Botox -11 headache days in 21 day, with none in the 10 weeks but 6 really severe headache days in the past 3 weeks .  She has noticed a wearing off phenomenon prior to this round of botulinum toxin injections, lasting 2-3 weeks.     Botulinum toxin injections have improved their functioning: able to read better, walking and not in bed      She has attempted other migraine prophylactic treatments in the past, which have included: Amitriptyline for 15 years and stopped   Topiramate  Sumatriptan   lyrica-\"brain fog\"  depakote -allergies  Compazine +benedryl  Promethazine IV three times per week for   scopalamine patch     She currently takes amitriptyline, " promethazine, zolmitriptine for headache prevention.     Ms. Skinners pain was assessed prior to the procedure.  She rated her pain today as 6 out of 10.     Procedural Pause: Procedural pause was conducted to verify correct patient identity, procedure to be performed, correct side and site, correct patient position, and special requirements. Appropriate hand hygiene was utilized, and each injection site was prepped with alcohol wipe or Chloraprep swab.      Procedure Details: 200 units of onabotulinumtoxinA was diluted in 4 mL 0.9% normal saline. A total of 155 units of onabotulinumtoxinA were injected using 30 gauge 0.5 in needles into the muscles listed below. 45 units of onabotulinumtoxinA were wasted.         GOAL OF PROCEDURE:  The goal of this procedure is to decrease pain and enhance functional independence.     CONSENT:  The risks, benefits, and treatment options were discussed with the patient who agreed to proceed.     Written consent was obtained      EQUIPMENT USED:  Needles-30 gauge, 0.5 inches for injections  Four 1-ml tuberculin syringes for injections  One sodium chloride 10 ml vial preservative free  Alcohol swabs     SKIN PREPARATION:  Skin preparation was performed using an alcohol wipe.        AREA/MUSCLE INJECTED:  155 units of Botox  Right upper Trapezius (upper cervical) - 5 units of Botox at 3 site/s.   Left upper Trapezius (upper cervical) - 5 units of Botox at 3 site/s.      Right cervical paraspinals - 5 units of Botox at 2 site/s.   Left cervical paraspinals - 5 units of Botox at 2 site/s.      Left occipitalis - 5 units of Botox at 3 site/s.  Right occipitalis -5 units of Botox at 3 site/s     Right Frontalis - 5 units of Botox at 2 site/s.  Left Frontalis - 5 units of Botox at 2 site/s.     Right Temporalis - 5 units of Botox at 4 site/s.  Left Temporalis - 5 units of Botox at 4 site/s.     Right  - 5 units of Botox at 1 site/s.              Left  - 5 units of Botox  at 1 site/s.     Procerus - 5 units of Botox at 1 site/s.     RESPONSE TO PROCEDURE:  tolerated the procedure well and there were no immediate complications.  Patient was allowed to recover for an appropriate period of time and was discharged home in stable condition.    Left PICC -patient asked to take a look at her PICC site. Painful when bumps into something but otherwise no pain, no erythema, edema. 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 She will observed and goes to ED if any problems      FOLLOW UP:     Repeat Botox injections in 12 weeks        This procedure was performed under a hospital privileging agreement with Dr. Briseno             Again, thank you for allowing me to participate in the care of your patient.      Sincerely,    NATALY Hoffman CNP

## 2024-03-01 DIAGNOSIS — R11.2 NAUSEA AND VOMITING, UNSPECIFIED VOMITING TYPE: Primary | ICD-10-CM

## 2024-03-04 ENCOUNTER — VIRTUAL VISIT (OUTPATIENT)
Dept: ONCOLOGY | Facility: CLINIC | Age: 59
End: 2024-03-04
Attending: SOCIAL WORKER
Payer: COMMERCIAL

## 2024-03-04 DIAGNOSIS — Z51.5 PALLIATIVE CARE PATIENT: ICD-10-CM

## 2024-03-04 DIAGNOSIS — F43.23 ADJUSTMENT DISORDER WITH MIXED ANXIETY AND DEPRESSED MOOD: Primary | ICD-10-CM

## 2024-03-04 PROCEDURE — 90834 PSYTX W PT 45 MINUTES: CPT | Mod: 95 | Performed by: SOCIAL WORKER

## 2024-03-04 RX ORDER — PROCHLORPERAZINE MALEATE 10 MG
10 TABLET ORAL EVERY 6 HOURS PRN
Qty: 120 TABLET | Refills: 3 | Status: SHIPPED | OUTPATIENT
Start: 2024-03-04 | End: 2024-03-05

## 2024-03-04 NOTE — PROGRESS NOTES
Virtual Visit Details    Type of service:  Video Visit     Originating Location (pt. Location): Home    Distant Location (provider location):  On-site  Platform used for Video Visit: [facility]        Palliative Care Clinical Social Work Return Appointment    PLEASE NOTE:  THIS IS A MENTAL HEALTH NOTE.  OTHER PROVIDERS VIEWING THIS NOTE SHOULD USE THIS ONLY FOR UNDERSTANDING THE CONTEXT OF THE PATIENT'S EXPERIENCE.  TOPICS DESCRIBED IN THIS NOTE SHOULD NOT BE REFERENCED TO THE PATIENT BY MEDICAL PROVIDERS.    Dinora Mcghee is a 58  year old woman with multiple medical conditions including chronic pancreatitis s/p TPAIT, long term complications from gastroparesis, constipation, GERD, migraines.  Had GJ placed Sept 2021, recovery was complicated and she ended up with a venting g-tube.  Due to complications with PO tolerance she had surgical placement of a J tube, and a resection of densely adhered small bowel.  Started on tube feedings, then on TPN.  TPN eventually stopped due to infection.  Has heavy symptom burden that includes chronic nausea and vomiting, fatigue, complex medical management.  Has had multiple ER visits for GI and Bowel concerns.  Seen today to address adjustment disorder with depressed mood and anxiety.         Mental Status Exam:(List all that apply)      Appearance: Appropriate      Eye Contact: Good       Orientation: Yes, x4      Mood: Normal       Affect: Appropriate       Thought Content: Clear         Thought Form: Logical      Psychomotor Behavior: Normal    Visit themes: Grief, loss, adjustment, ambiguous grief, symptom burden,quality of life    Adjustment to Illness:  Preparing for appointments with GI later this week.  Thinking about quality of life, wondering what other treatments might be available.     Expresses she wants to avoid hospitalizations via the ED but is still open to hospitalizations if the ED can be avoided.  She continues to do her daily bowel regimen which is  "\"uncomfortable, embarrassing, and doesn't seem to be effective\"     Expresses worry that her current state is the best she can expect to feel        Mental Health (thoughts, feelings, actions, coping, and symptoms):      Heavy symptom burden today so mood also low  Demoralized \"I'm doing everythign I'm supposed to do\"  No expressions of guilt \"just bad gut genetics\"   Overwhelmed, unsure of how to move forward with her plan of care and current quality of life.     Will see GI later this week. Describes sense of feeling depleted.  Still enjoys time with family and activities such as knitting but is also processing deep grief for the disruption in her career that her  illness has caused as her work was an outlet for creating a sense of meaning and purpose.         Helpful activities: time with family.  Meditating, knitting gifts for family members and friends, tapping.  Uses EMDR techniques. Trips that she is looking forward to.         Helpful cognitions:     Unhelpful and/or triggering or exacerbating factors:     Body-Mind Skills Education: uses tapping and EMDR     Relationships:  and children, extended family    End of Life and Advance Care Planning:     Main therapeutic interventions provided this session include:     Provide psychoeducation, Facilitate processing of thoughts and feelings, Facilitate goals of care discussion, and Facilitate structured problem solving    Plan:  Will return for psychotherapy with palliative care focus in 2 weeks        Time spent with patient/family:   46 (Start 2:00 end 2:46)    JAIDA Nguyen, [facility]   Palliative Care Clinical   Ph: [phone]      DO NOT SEND ANY LETTERS              "

## 2024-03-04 NOTE — LETTER
3/4/2024         RE: Dinora Mcghee  [address] [facility] [address]        Dear Colleague,    Thank you for referring your patient, Dinora Mcghee, to the [facility]ER [facility]. Please see a copy of my visit note below.    Virtual Visit Details    Type of service:  Video Visit     Originating Location (pt. Location): Home    Distant Location (provider location):  On-site  Platform used for Video Visit: [facility]        Palliative Care Clinical Social Work Return Appointment    PLEASE NOTE:  THIS IS A MENTAL HEALTH NOTE.  OTHER PROVIDERS VIEWING THIS NOTE SHOULD USE THIS ONLY FOR UNDERSTANDING THE CONTEXT OF THE PATIENT'S EXPERIENCE.  TOPICS DESCRIBED IN THIS NOTE SHOULD NOT BE REFERENCED TO THE PATIENT BY MEDICAL PROVIDERS.    Dinora Mcghee is a 58  year old woman with multiple medical conditions including chronic pancreatitis s/p TPAIT, long term complications from gastroparesis, constipation, GERD, migraines.  Had GJ placed Sept 2021, recovery was complicated and she ended up with a venting g-tube.  Due to complications with PO tolerance she had surgical placement of a J tube, and a resection of densely adhered small bowel.  Started on tube feedings, then on TPN.  TPN eventually stopped due to infection.  Has heavy symptom burden that includes chronic nausea and vomiting, fatigue, complex medical management.  Has had multiple ER visits for GI and Bowel concerns.  Seen today to address adjustment disorder with depressed mood and anxiety.         Mental Status Exam:(List all that apply)      Appearance: Appropriate      Eye Contact: Good       Orientation: Yes, x4      Mood: Normal       Affect: Appropriate       Thought Content: Clear         Thought Form: Logical      Psychomotor Behavior: Normal    Visit themes: Grief, loss, adjustment, ambiguous grief, symptom burden,quality of life    Adjustment to Illness:  Preparing for appointments with GI later this week.  Thinking about  "quality of life, wondering what other treatments might be available.     Expresses she wants to avoid hospitalizations via the ED but is still open to hospitalizations if the ED can be avoided.  She continues to do her daily bowel regimen which is \"uncomfortable, embarrassing, and doesn't seem to be effective\"     Expresses worry that her current state is the best she can expect to feel        Mental Health (thoughts, feelings, actions, coping, and symptoms):      Heavy symptom burden today so mood also low  Demoralized \"I'm doing everythign I'm supposed to do\"  No expressions of guilt \"just bad gut genetics\"   Overwhelmed, unsure of how to move forward with her plan of care and current quality of life.     Will see GI later this week. Describes sense of feeling depleted.  Still enjoys time with family and activities such as knitting but is also processing deep grief for the disruption in her career that her  illness has caused as her work was an outlet for creating a sense of meaning and purpose.         Helpful activities: time with family.  Meditating, knitting gifts for family members and friends, tapping.  Uses EMDR techniques. Trips that she is looking forward to.         Helpful cognitions:     Unhelpful and/or triggering or exacerbating factors:     Body-Mind Skills Education: uses tapping and EMDR     Relationships:  and children, extended family    End of Life and Advance Care Planning:     Main therapeutic interventions provided this session include:     Provide psychoeducation, Facilitate processing of thoughts and feelings, Facilitate goals of care discussion, and Facilitate structured problem solving    Plan:  Will return for psychotherapy with palliative care focus in 2 weeks        Time spent with patient/family:   46 (Start 2:00 end 2:46)    JAIDA Nguyen, [facility]   Palliative Care Clinical   Ph: [phone]      DO NOT SEND ANY LETTERS                Again, thank you " for allowing me to participate in the care of your patient.        Sincerely,        Sweta Ghosh, CASSSW

## 2024-03-04 NOTE — LETTER
3/4/2024         RE: Dinora Mcghee  [address] [facility] [address]        Dear Colleague,    Thank you for referring your patient, Dinora Mcghee, to the [facility]. Please see a copy of my visit note below.    Virtual Visit Details    Type of service:  Video Visit     Originating Location (pt. Location): Home    Distant Location (provider location):  On-site  Platform used for Video Visit: [facility]        Palliative Care Clinical Social Work Return Appointment    PLEASE NOTE:  THIS IS A MENTAL HEALTH NOTE.  OTHER PROVIDERS VIEWING THIS NOTE SHOULD USE THIS ONLY FOR UNDERSTANDING THE CONTEXT OF THE PATIENT'S EXPERIENCE.  TOPICS DESCRIBED IN THIS NOTE SHOULD NOT BE REFERENCED TO THE PATIENT BY MEDICAL PROVIDERS.    Dinora Mcghee is a 58  year old woman with multiple medical conditions including chronic pancreatitis s/p TPAIT, long term complications from gastroparesis, constipation, GERD, migraines.  Had GJ placed Sept 2021, recovery was complicated and she ended up with a venting g-tube.  Due to complications with PO tolerance she had surgical placement of a J tube, and a resection of densely adhered small bowel.  Started on tube feedings, then on TPN.  TPN eventually stopped due to infection.  Has heavy symptom burden that includes chronic nausea and vomiting, fatigue, complex medical management.  Has had multiple ER visits for GI and Bowel concerns.  Seen today to address adjustment disorder with depressed mood and anxiety.         Mental Status Exam:(List all that apply)      Appearance: Appropriate      Eye Contact: Good       Orientation: Yes, x4      Mood: Normal       Affect: Appropriate       Thought Content: Clear         Thought Form: Logical      Psychomotor Behavior: Normal    Visit themes: Grief, loss, adjustment, ambiguous grief, symptom burden,quality of life    Adjustment to Illness:  Preparing for appointments with GI later this week.  Thinking about  "quality of life, wondering what other treatments might be available.     Expresses she wants to avoid hospitalizations via the ED but is still open to hospitalizations if the ED can be avoided.  She continues to do her daily bowel regimen which is \"uncomfortable, embarrassing, and doesn't seem to be effective\"     Expresses worry that her current state is the best she can expect to feel        Mental Health (thoughts, feelings, actions, coping, and symptoms):      Heavy symptom burden today so mood also low  Demoralized \"I'm doing everythign I'm supposed to do\"  No expressions of guilt \"just bad gut genetics\"   Overwhelmed, unsure of how to move forward with her plan of care and current quality of life.     Will see GI later this week. Describes sense of feeling depleted.  Still enjoys time with family and activities such as knitting but is also processing deep grief for the disruption in her career that her  illness has caused as her work was an outlet for creating a sense of meaning and purpose.         Helpful activities: time with family.  Meditating, knitting gifts for family members and friends, tapping.  Uses EMDR techniques. Trips that she is looking forward to.         Helpful cognitions:     Unhelpful and/or triggering or exacerbating factors:     Body-Mind Skills Education: uses tapping and EMDR     Relationships:  and children, extended family    End of Life and Advance Care Planning:     Main therapeutic interventions provided this session include:     Provide psychoeducation, Facilitate processing of thoughts and feelings, Facilitate goals of care discussion, and Facilitate structured problem solving    Plan:  Will return for psychotherapy with palliative care focus in 2 weeks        Time spent with patient/family:   46 (Start 2:00 end 2:46)    JAIDA Nguyen, [facility]   Palliative Care Clinical   Ph: [phone]      DO NOT SEND ANY LETTERS                Again, thank you " for allowing me to participate in the care of your patient.        Sincerely,        Sweta Ghosh, CASSSW

## 2024-03-05 DIAGNOSIS — R11.2 NAUSEA AND VOMITING, UNSPECIFIED VOMITING TYPE: ICD-10-CM

## 2024-03-06 ENCOUNTER — OFFICE VISIT (OUTPATIENT)
Dept: GASTROENTEROLOGY | Facility: CLINIC | Age: 59
End: 2024-03-06
Payer: COMMERCIAL

## 2024-03-06 VITALS
SYSTOLIC BLOOD PRESSURE: 121 MMHG | HEART RATE: 103 BPM | BODY MASS INDEX: 18.79 KG/M2 | HEIGHT: 68 IN | WEIGHT: 124 LBS | DIASTOLIC BLOOD PRESSURE: 77 MMHG | OXYGEN SATURATION: 97 %

## 2024-03-06 DIAGNOSIS — R11.2 NAUSEA AND VOMITING, UNSPECIFIED VOMITING TYPE: Primary | ICD-10-CM

## 2024-03-06 DIAGNOSIS — K21.9 GASTROESOPHAGEAL REFLUX DISEASE WITHOUT ESOPHAGITIS: ICD-10-CM

## 2024-03-06 DIAGNOSIS — K31.84 GASTROPARESIS: Primary | ICD-10-CM

## 2024-03-06 DIAGNOSIS — K59.00 CONSTIPATION, UNSPECIFIED CONSTIPATION TYPE: ICD-10-CM

## 2024-03-06 DIAGNOSIS — Z94.83 STATUS POST PANCREAS TRANSPLANTATION (H): ICD-10-CM

## 2024-03-06 DIAGNOSIS — Z93.4 SMALL BOWEL TUBE FEEDING (H): ICD-10-CM

## 2024-03-06 PROCEDURE — 2894A VOIDCORRECT: CPT | Performed by: INTERNAL MEDICINE

## 2024-03-06 PROCEDURE — 99213 OFFICE O/P EST LOW 20 MIN: CPT | Mod: 24 | Performed by: INTERNAL MEDICINE

## 2024-03-06 ASSESSMENT — PAIN SCALES - GENERAL: PAINLEVEL: MODERATE PAIN (4)

## 2024-03-06 NOTE — LETTER
3/6/2024         RE: Dinora Mcghee  [address] [facility] [place] [address]        Dear Colleague,    Thank you for referring your patient, Dinora Mcghee, to the [facility] [place]. Please see a copy of my visit note below.    Pt very well known to us, seen in conjunction w Dr Genao. My contribution: Patient obvious depressed, tearful.     Spent 30 minutes in person w pt, , Dr HITCHCOCK.     My suggestion  See PCP about starting antidepressant: Cymbalta might be ideal, as works for depression and pain, selective serotonin reuptake inhibitor/SNRI      Past Medical, Surgical, Family, and Social Histories:    Past Medical History:   Diagnosis Date    Allergic rhinitis, cause unspecified     Allergy to other foods     strawberries, apples, celeries, alice, watermelon    Arthritis     left knee    Choledocholithiasis     long after cholecystectomy    Chronic abdominal pain     Chronic constipation     Chronic nausea     Chronic pancreatitis (H)     Degeneration of lumbar or lumbosacral intervertebral disc     Esophageal reflux     w/ hiatal hernia    Gastroparesis     Hiatal hernia     History of pituitary adenoma     s/p resection    Hypothyroidism     Migraines     Mild hyperlipidemia     On tube feeding diet     presence of GJ tube    Pancreatic disease     PD stricture, 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 endometriosis growth    APPENDECTOMY       SECTION  1993    COLONOSCOPY  2014    COLONOSCOPY N/A 2023    Procedure: COLONOSCOPY, WITH POLYPECTOMY AND BIOPSY;  Surgeon: Percy Chamorro MD;  [facility]    ENDOSCOPIC INSERTION TUBE  GASTROSTOMY N/A 11/28/2021    Procedure: Gastrojejonostomy placement with jejunopexy, PEG tube exchange;  Surgeon: Zackery Montoya MD;  [facility]    ENDOSCOPIC RETROGRADE CHOLANGIOPANCREATOGRAM N/A 06/02/2015    Procedure: ENDOSCOPIC RETROGRADE CHOLANGIOPANCREATOGRAM;  Surgeon: Mandeep Park MD;  [facility]    ENDOSCOPIC RETROGRADE CHOLANGIOPANCREATOGRAM N/A 02/09/2016    Procedure: COMBINED ENDOSCOPIC RETROGRADE CHOLANGIOPANCREATOGRAPHY, PLACE TUBE/STENT;  Surgeon: Mandeep Park MD;  [facility]    ENDOSCOPIC RETROGRADE CHOLANGIOPANCREATOGRAM N/A 03/17/2016    Procedure: COMBINED ENDOSCOPIC RETROGRADE CHOLANGIOPANCREATOGRAPHY, REMOVE FOREIGN BODY OR STENT/TUBE;  Surgeon: Mandeep Park MD;  [facility]    ENDOSCOPIC RETROGRADE CHOLANGIOPANCREATOGRAM N/A 08/02/2016    Procedure: COMBINED ENDOSCOPIC RETROGRADE CHOLANGIOPANCREATOGRAPHY, PLACE TUBE/STENT;  Surgeon: Mandeep Park MD;  [facility]    ENDOSCOPIC RETROGRADE CHOLANGIOPANCREATOGRAM N/A 08/26/2016    Procedure: COMBINED ENDOSCOPIC RETROGRADE CHOLANGIOPANCREATOGRAPHY, REMOVE FOREIGN BODY OR STENT/TUBE;  Surgeon: Mandeep Park MD;  Location:  OR    ENDOSCOPIC ULTRASOUND UPPER GASTROINTESTINAL TRACT (GI) N/A 10/03/2016    Procedure: ENDOSCOPIC ULTRASOUND, ESOPHAGOSCOPY / UPPER GASTROINTESTINAL TRACT (GI);  Surgeon: Guru Jose Rodas MD;  Location:  OR    ENT SURGERY  1989    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    ENTEROSCOPY SMALL BOWEL N/A 02/03/2022    Procedure: ENTEROSCOPY with possible fistula closure;  Surgeon: Francisco Dodson MD;  Location:  GI    ESOPHAGOSCOPY, GASTROSCOPY, DUODENOSCOPY (EGD), COMBINED N/A 06/24/2015    Procedure: COMBINED ESOPHAGOSCOPY, GASTROSCOPY, DUODENOSCOPY (EGD), REMOVE FOREIGN BODY;  Surgeon: Mandeep Park MD;  Location:  GI    ESOPHAGOSCOPY, GASTROSCOPY, DUODENOSCOPY (EGD), COMBINED N/A 10/25/2015    Procedure:  COMBINED ESOPHAGOSCOPY, GASTROSCOPY, DUODENOSCOPY (EGD);  Surgeon: Sammy Amaro MD;  [facility]    ESOPHAGOSCOPY, GASTROSCOPY, DUODENOSCOPY (EGD), COMBINED N/A 10/25/2015    Procedure: COMBINED ESOPHAGOSCOPY, GASTROSCOPY, DUODENOSCOPY (EGD), BIOPSY SINGLE OR MULTIPLE;  Surgeon: Sammy Amaro MD;  [facility]    ESOPHAGOSCOPY, GASTROSCOPY, DUODENOSCOPY (EGD), COMBINED N/A 01/31/2023    Procedure: ESOPHAGOGASTRODUODENOSCOPY (EGD) with peg replacement ;  Surgeon: Mandeep Park MD;  [facility]    ESOPHAGOSCOPY, GASTROSCOPY, DUODENOSCOPY (EGD), DILATATION, COMBINED      EXCISE LESION TRUNK N/A 04/17/2017    Procedure: EXCISE LESION TRUNK;  Removal of Abdominal Foreign Body;  Surgeon: Nestor Phoenix MD;  [facility]    HC ESOPH/GAS REFLUX TEST W NASAL IMPED >1 HR N/A 11/19/2015    Procedure: ESOPHAGEAL IMPEDENCE FUNCTION TEST WITH 24 HOUR PH GREATER THAN 1 HOUR;  Surgeon: Thiago Apple MD;  [facility]    HC UGI ENDOSCOPY DIAG W BIOPSY  09/17/2008    HC UGI ENDOSCOPY DIAG W BIOPSY  09/27/2012    HC UGI ENDOSCOPY W ESOPHAGEAL DILATION BALLOON <30MM  09/17/2008    HC UGI ENDOSCOPY W EUS N/A 05/05/2015    Procedure: COMBINED ENDOSCOPIC ULTRASOUND, ESOPHAGOSCOPY, GASTROSCOPY, DUODENOSCOPY (EGD);  Surgeon: Wm Dueñas MD;  [facility]    HC WRIST ARTHROSCOP,RELEASE XVERS LIG Bilateral 12/17/2008    INJECT TRANSVERSUS ABDOMINIS PLANE (TAP) BLOCK BILATERAL Left 09/22/2016    Procedure: INJECT TRANSVERSUS ABDOMINIS PLANE (TAP) BLOCK BILATERAL;  Surgeon: Dickson Corrigan MD;  [facility]    INJECT TRIGGER POINT Bilateral 09/08/2022    Procedure: Abdominal trigger point injection with ultrasound;  Surgeon: Monika Mahajan MD;  Location: [facility]    INJECT TRIGGER POINT SINGLE / MULTIPLE 3 OR MORE MUSCLES Right 11/15/2022    Procedure: Trigger point injections right abdomen with ultrasound guidance;  Surgeon: Monika Mahajan MD;  Location: [facility]    IR CHEST  PORT PLACEMENT < 5 YRS OF AGE  06/10/2022    IR PORT REMOVAL RIGHT  11/09/2023    laparoscopic pineda  01/01/1995    LAPAROSCOPIC HERNIORRHAPHY INCISIONAL N/A 08/23/2018    Procedure: LAPAROSCOPIC HERNIORRHAPHY INCISIONAL;  Laparoscopic Incisional Hernia Repair with Symbotex Mesh Implant;  Surgeon: Nestor Phoenix MD;  [facility]    LAPAROSCOPIC PANCREATECTOMY, TRANSPLANT AUTO ISLET CELL N/A 12/28/2016    Procedure: LAPAROSCOPIC PANCREATECTOMY, TRANSPLANT AUTO ISLET CELL;  Surgeon: Nestor Phoenix MD;  [facility]    LAPAROTOMY EXPLORATORY N/A 01/31/2023    Procedure: Exploratory Laparotomy, lysis of adhesions, Perforated J-Junostomy Resection, Replace J-Junostomy site;  Surgeon: Elver Bauer MD;  [facility]    LAPAROTOMY, LYSIS ADHESIONS, COMBINED N/A 01/31/2023    Procedure: Dilatation of jejunostomy feeding tube, track with placement of jejunostomy tube with fluoroscopy;  Surgeon: Elver Bauer MD;  [facility]    PICC DOUBLE LUMEN PLACEMENT Left 11/13/2023    Basilic Vein 5F DL 43 cm    REMOVE AND REPLACE BREAST IMPLANT PROSTHESIS N/A 12/30/2022    Procedure: Exploratory Laparotomy, lysis of adhesions, small bowel resection. Placement of gastric jejunostomy for enteral feeding.;  Surgeon: Elver Bauer MD;  [facility]    REMOVE GASTROSTOMY TUBE ADULT N/A 01/28/2022    Procedure: REMOVAL, GASTROSTOMY TUBE, ADULT;  Surgeon: Mandeep Park MD;  [facility]    REPLACE GASTROJEJUNOSTOMY TUBE, PERCUTANEOUS N/A 09/07/2021    Procedure: GASTROJEJUNOSTOMY TUBE PLACEMENT, PERCUTANEOUS, WITH GASTROPEXY;  Surgeon: Mandeep Park MD;  [facility]    REPLACE GASTROJEJUNOSTOMY TUBE, PERCUTANEOUS N/A 09/22/2021    Procedure: REPLACEMENT, GASTROJEJUNOSTOMY TUBE, PERCUTANEOUS;  Surgeon: Zackery Montoya MD;  [facility]    REPLACE GASTROJEJUNOSTOMY TUBE, PERCUTANEOUS N/A 11/22/2022    Procedure: REPLACEMENT, GASTROJEJUNOSTOMY TUBE,  PERCUTANEOUS;  Surgeon: Mandeep Park MD;  [facility]    REPLACE GASTROJEJUNOSTOMY TUBE, PERCUTANEOUS N/A 2022    Procedure: REPLACEMENT, GASTROJEJUNOSTOMY TUBE, PERCUTANEOUS with ENDOSCOPIC SUTURING.;  Surgeon: Mandeep Park MD;  [facility]    REPLACE GASTROJEJUNOSTOMY TUBE, PERCUTANEOUS N/A 2023    Procedure: Replace Gastrojejunostomy Tube, Percutaneous;  Surgeon: Mandeep Park MD;  [facility]    REPLACE GASTROJEJUNOSTOMY TUBE, PERCUTANEOUS N/A 2023    Procedure: Replace Gastrojejunostomy Tube, Percutaneous;  Surgeon: Francisco Dodson MD;  [facility]    REPLACE GASTROJEJUNOSTOMY TUBE, PERCUTANEOUS N/A 2023    Procedure: Replace Gastrojejunostomy Tube, Percutaneous;  Surgeon: Mandeep Prak MD;  [facility]    REPLACE JEJUNOSTOMY TUBE, PERCUTANEOUS N/A 09/10/2021    Procedure: UPPER ENDOSCOPY, REPLACEMENT OF PERCUTANEOUS GASTROJEJUNOSTOMY TUBE, T-TAG GASTROPEXY;  Surgeon: Zackery Montoya MD;  [facility]    REPLACE JEJUNOSTOMY TUBE, PERCUTANEOUS N/A 2021    Procedure: REPLACEMENT, JEJUNOSTOMY TUBE, PERCUTANEOUS;  Surgeon: Mandeep Park MD;  [facility]    REPLACE JEJUNOSTOMY TUBE, PERCUTANEOUS N/A 2023    Procedure: REPLACEMENT, JEJUNOSTOMY TUBE, PERCUTANEOUS;  Surgeon: Mandeep Park MD;  [facility]    REPLACE JEJUNOSTOMY TUBE, PERCUTANEOUS  2023    Procedure: Replace Jejunostomy Tube, Percutaneous;  Surgeon: Mandeep Park MD;  [facility]    transphenoidal pituitary resection  1990    [facility]  DELIVERY ONLY  1996    [facility]  DELIVERY ONLY  1998    repeat c section with incidental cystotomy with repair    [facility] EXCIS PITUITARY,TRANSNASAL/SEPTAL  1980    pituitary tumor removed for diabetes insipidus    [facility] TOTAL ABDOM HYSTERECTOMY  1999    w/ bilateral salpingoophorectomy        Family History   Problem Relation Age of Onset     Lipids Mother     Hypertension Mother     Thyroid Disease Mother     Depression Mother     Angina Mother     GERD Mother     Skin Cancer Mother     Migraines Mother     Autoimmune Disease Mother     Hyperlipidemia Mother     Mental Illness Mother     Cerebrovascular Disease Mother         11/2023    Other Cancer Mother         skin-basil cell    Anxiety Disorder Mother     Eye Disorder Father         cataract, detached retina    Myocardial Infarction Father 60    Lipids Father     Cerebrovascular Disease Father     Depression Father     Substance Abuse Father     Anesthesia Reaction Father         stroke right after surgery    Cataracts Father     Osteoarthritis Father     Ulcerative Colitis Father     Autoimmune Disease Father     Heart Disease Father     Hyperlipidemia Father     Mental Illness Father     Other Cancer Father         myloma    Anxiety Disorder Father     Interpersonal Violence Sister     Coronary Artery Disease Sister         angioplasty    GERD Sister     Substance Abuse Sister     Cerebrovascular Disease Sister         2005    Depression Sister     Thyroid Disease Sister     Eye Disorder Maternal Grandmother         cataract    Thyroid Disease Maternal Grandmother     Diabetes Maternal Grandfather     Eye Disorder Paternal Grandmother         cataract    Diabetes Paternal Grandmother     Eye Disorder Paternal Grandfather         cataract    Diabetes Paternal Grandfather     Substance Abuse Paternal Grandfather     Eye Disorder Son         ptosis    Depression Son     Anxiety Disorder Son     Heart Disease Paternal Aunt     Diabetes Paternal Aunt     Diabetes Paternal Uncle     Heart Disease Paternal Uncle     Depression Nephew     Anxiety Disorder Nephew     Thyroid Disease Nephew     Diabetes Type 2  Cousin         paternal cousin    Autoimmune Disease Cousin     Autoimmune Disease Sister     Depression Sister     Mental Illness Sister     Substance Abuse Sister     Thyroid Disease Sister      Depression Son     Mental Illness Son     Anxiety Disorder Son     Thyroid Disease Nephew     Anxiety Disorder Nephew        Social History     Tobacco Use    Smoking status: Former     Packs/day: 0.50     Years: 6.00     Additional pack years: 0.00     Total pack years: 3.00     Types: Cigarettes     Start date: 1985     Quit date: 1992     Years since quittin.2    Smokeless tobacco: Not on file    Tobacco comments:     no 2nd hand   Substance Use Topics    Alcohol use: Not Currently     Alcohol/week: 3.0 - 6.0 standard drinks of alcohol     Types: 1 - 2 Glasses of wine, 1 - 2 Cans of beer, 1 - 2 Shots of liquor per week     Comment: none since IGG     Again, thank you for allowing me to participate in the care of your patient.      Sincerely,    Mandeep Park MD

## 2024-03-06 NOTE — LETTER
"    3/6/2024         RE: Dinroa Mcghee  [address] [facility] [address]        Dear Colleague,    Thank you for referring your patient, Dinora Mcghee, to the [facility] [place]. Please see a copy of my visit note below.    GI CLINIC VISIT    CC: follow-up      ASSESSMENT/PLAN:  (K31.84) Gastroparesis  (K59.00) Constipation, unspecified constipation type  (Z93.4) Small bowel tube feeding (H)  (K21.9) Gastroesophageal reflux disease without esophagitis  (Z94.83) Status post pancreas transplantation (H)      As noted previously, severe gastroparesis, with marked nausea/vomiting, pain, inability to tolerate po intake. Unresponsive to medications (promotility agents, domperidone, antiemetic regimen, etc). Continues with G-tube for venting (currently near continuous). With surgical J-tube for TFs which she is generally tolerating. Not currently taking po due to symptoms (pain, fullness, nausea, vomiting). Continue with antiemetics, has some IV through [facility]. Future consideration could be hydroxyzine.       Constipation seems to have been driving more recent severe symptoms with improvement in discomfort after extensive bowel evacuation. Would adjust regimen as outlined below including backing off on enema frequency and consideration of use of Movantik given increased pain medication use; would like to ensure pain team is in agreement (script can come from them or GI clinic). Plan for GGE before next planned travel and discussion      Dr. Mandeep Park was able to stop into the visit and talk with Ms. Loo as well. Understandably, with persisting symptoms and time-consuming home cares, her QOL is suffering and she has lost her \"reserve\". They did discuss treating and supporting any component of situational depression while medical cares are adjusted.      - decrease pink lady enemas to once every day to every other day  - continue to administer on your left side and rotate, " holding enema in for at least 15 minutes  - continue your current oral/feeding tube bowel regimen  - discuss Movantik with your pain clinic team, we can consider a trial to see if this improves things at all  - schedule a gastrograffin enema at your earliest convenience  - contact GI clinic if having marked pain or no response to home bowel regimen, we can consider sooner GGE enema  - referral to colorectal surgery clinic  - check in as discussed with your primary care clinic regarding medication for situational depression  - follow-up in GI clinic as planned in two weeks         It was a pleasure to participate in the care of this patient; please contact us with any further questions.  A total of 79  face-to-face minutes was spent with this patient, >50% of which was counseling regarding the above delineated issues. An additional 11 minutes was spent on the date of the encounter doing chart review, documentation, and further activities as noted above. Additional (unbilled) time was spent in the days prior and after in care coordination and record review.     Vijaya Genao MD   of Medicine  Division of Gastroenterology, Hepatology and Nutrition  [facility]    ---------------------------------------------------------------------------------------------------  HPI:      Ms. 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 She was initially seen in general GI clinic by this writer on 11/1/2017 and has been/ seen by myself and ANA Gan since that time. Her pancreas txplt surgeon was Dr. Phoenix. She also follows with   [facility]. Her last visit with me was 10/4/2023, though she has been seen in Pancreas Clinic in the interim.       Since her last visit, Ms. Loo expresses concern and frustration at how unwell she has been feeling (see constipation below) despite uses of her home meds and action plan. She has struggled with impact on QOL and inability to work anymore or due the activities she enjoys, such as hiking, traveling.      History summarized and updated from previous GI documentation. Please see previous notes for further details      Gastroparesis, nausea and vomiting  Venting G-tube  Tube feeds via J-tube  Initially diagnosed years ago with 2-hour study gastric emptying study at [facility]. Repeat testing here with 4-hour GES on 6/27/2016 with 49% remaining at 4 hours (?on pain meds at the time).  This was addressed with prior GJ tube in fall 2016, NG tube used for venting. Ultimately, after TPIAT, she was able to wean off of TFs and return to a regular diet.  Patient did have some persisting nausea and vomiting which she treated over the years with various medications including scopolamine patch, prochlorperazine, and promethazine. For GP in the past she had no response to metoclopramide. She has not been able to try erythromycin or azithromycin due to anaphylactic event with prior azithromycin use (in ED in 2008) She has also been seen by neurology and psychology to assist with insomnia and with migraines (and any contribution they may have to nausea).       Unfortunately, things acutely worsened in November 2020.  An ED evaluation at that time was unremarkable and CT only revealed moderate stool burden, nonspecific fluid in small intestine.  Theses episodes continued to recur.       Bowel regimen was adjusted to ensure adequate output. Dietary changes were made (freq, small meals to liquid-only) and support with antiemetics. Initially these dietary changes seemed to help, but over  time the response waned. Another course of rifaximin (previously successful at addressing bloating and stool issues) was not helpful for her pain, bloating/fullness, and n/v. Amitriptyline was increased to 60 mg nightly with no change in discomfort.    From winter 2020 to May/Saba she lost about 16 lbs (from 156-->140 lbs). She continued to experience more pronounced post-prandial pain and fullness and had vomiting of food after eating. Work-up for alternate causes (with SBFT, CTE, CTA) was normal.       A GJ was placed in Sept 2021, but this was replaced a few times due to J-tube coiling in stomach as well as G-tube clogging/malfunction. On 10/19/21 she had placement of a weighted GJ but continued to struggle with discomfort at insertion site, clogging/venting issues. Ultimately a direct jejunostomy was done and after struggling with this for awhile it was removed. She worked hard to return to an oral diet.      By summer 2022, Ms. oLo had only a G-tube for venting.  At that time she was tolerating one small plate/1 cup/1 bowl of food without vomiting, 70% of the time. She would have 5-6 of these servings a day (~4152-8940 Kcal). Ms. Loo also had regular protein supplement drinks and hydration drinks. Her weight stabilized around 140 lbs. For nausea, she continued on her chronic scopolamine patch as well as prochlorperazine and promethazine about every 6-8 hours.       Later in 2022 she had progressive decline in her ability to tolerate oral intake and continued to lose weight. She was initiated on domperidone by Dr. Mandeep Park, but did not have response even with up-titration of dosing. Small bowel feeds were discussed and her G (KISHORE-KEY) was replaced with a G-J a couple times, but had issues with J-tube extension coiling. Ultimately on 12/30/22, Dr. Bauer placed a direct J in the OR; extensive BAUDILIO and a resection of 15 cm of SB with dense adhesions was also done. TFs were started and continued in  jm from that point on.      In Jan 2023, unfortunately a perforation occurred during G and J tube exchange. She went to the OR that day. Jejunal perforation was repaired with bowel resection and additional BAUDILIO was performed. She was hospitalized thereafter and TFs were restarted.      Through early 2023, she felt better on just jejunal feeds with reduction in emesis, though still experienced nausea  for which she took: 1 scopolamine patch daily, IV promethazine once daily with J-tube promethazine at night, and compazine 10 mg - once daily. Her TFs were running at 45 mL for 18 hrs a day with oral intake is just bites of food for pleasure. She continued to vent her G-tube frequently - mostly seeing clear, colored liquid - no TFs. She continued on Relizorb digestive enzyme cartridge and did an IVF bolus via port each day.     By fall 2023,  Ms. Loo reports had one ED presentation due to symptoms concerning for SBO, though imaging was unremarkable. She stated she had two hours of diarrhea after leaving the ED and felt markedly better. At her last visit, she was   vomiting about 5 times a week. This is better with her doing 24/7 venting. She has very little in her emesis, unless it occurs shortly after her pills are given via the G-tube. In addition to her chronic antiemetics, she started buprenorphine patch and medical MJ which were helpful. She continued on J-tube feeds (for all nutritional needs, was not tolerating things by mouth).  She did feel she had leaking around the J-tube, but this has since decreased. She reiterates that she very much does not want to re-do the tube unless absolutely necessary.    Today, nausea and emesis have continued to impact any oral intake. Jejunal feeds continue (see constipation below).         Constipation/irregular bowel movements/bloating  Patient reports history of ongoing constipation for over 20 years after having her first child.  She has tried multiple interventions  "including regular bowel cleanouts, MiraLAX, senna, milk of magnesium, Linzess, and Amitiza for which she was on when she first came [facility].  Note, patient has been on Creon in the past (currently on Relizorb digestive enzyme cartridge).    Amitiza had worked for awhile then lost effectiveness. She was trialed on Trulance but reported frequent loose stools in the initial days (including nocturnal stooling and incontinence) which prompted her to stop. Trial of rifaximin in the past with with improvement in bloating and stool consistency though, as stated above, a re-trial for her pain, n/v, and fullness was not successful.    Motegrity was then started with some success, though noted HAs. We discussed a possible switch in her regimen to address this, though she was most comfortable with continuing Motegrity and monitoring her symptoms.  Previously discussed combining daily constipation medications; noted patient reports cost for Amitiza was quite a lot  (running $1000+/month).     By 2022, she was on Motegrity daily as well as Miralax 1 capful BID, 4 senna a day, 1000 mg Mag citrate daily, and stool softeners. With this she would have no BMs for 3-4 days (though has gone anywhere from 1-8 days), then have a day of emptying where she has multiple BMs over a day. These were often \"enormous\" and she felt empty when complete. Noted her bowel pattern is complicated by her need for antiemetics. When she is \"backed up\" she will experience early satiety.       By late 2022, she felt BMs were improved with restart of TFs. She stopped Motegrity/prucalopride around 12/30/2022. She was taking only 2.5 mL of liquid senna BID (total 8.8 mg daily).  With this Ms. Loo, was moving her bowels 3-4 days out of a week. On the days she moved her bowels it was typically more than once (2-3 times). Stool consistency was soft, \"ribbon-like\" stools. She denied straining. When asked about HAs she did feel these were better - unclear " "if this is because she went off Motegrity or if due to treatment of migraines  (received Botox injections.)     In fall 2023, she's restarted Motegrity, Senna twice daily (5 mL) with Miralax prn. She continued on amitriptyline (discomfort). With this she had a BM 5 out of 7 days , nearly always in the morning. She may have a few more  BMs later in the day, though she's done by early afternoon. Stools were often formed, smooth, and \"sticky\", occasionally loose.  She continued on Relizorb.     Recently,  Ms. Loo has had increasing nausea, abdominal pain, and decreased stool output. Please see Atempo messages and telephone encounters for further details. Acute CT imaging did not reveal an SBO or other pathology. Home bowel regimen was not particularly successful. She ended up getting a soapsuds enema at her local ED with results and some improved symptoms. We pursued a therapeutic gastrograffin enema and aggressive bowel regimen to ensure colonic emptying to address worsening pain, discomfort, and nausea.     Today, she reports that with the GGE she experienced the same discomfort/fullness/heaviness that prompted her imaging and ED evaluation (though to a less severe degree). Following the GGE, she passed a ton of stool immediately afterwards which continued for a few hours, then nothing more.     She is doing pink lady enemas including holding them in and rotating her body as instructed (holds for about 30 minutes in total), though only the enema comes back out most times. About every 2-3 days she will have some brown liquid and \"mud\" consistency stool come out (puts in maybe 250 mL enema and gets about 800 mL out).  She did have fecal leakage at night which was understandably distressing to her.     She is still continuing with pelvic floor PT with [facility]; reportedly working on rectal relaxation.     GERD, Dysphagia   Summarized/taken from prior documentation  Patient experiences GERD as substernal and throat " burning with nocturnal relaxant causing choking. She reports a prior Schatzki's ring requiring previous dilation. Manometry in 2015 was normal, and pH impedence at that time had a DeMeester of 24 with positive symptoms association. Patient has treated GERD symptoms with some combination of BID PPI, Carafate, H2 blocker, and Tums in the past. She'd had issues with access to liquid PPI due to insurance and liquid famotidine had not be sufficient for symptoms. Omeprazole by mouth seemed to never pass the stomach/get absorbed and would come out the G-tube when vented even when clamped for an hour or so (clamping longer led to more severe pain and symptoms).      Today, she continues with IV pantoprazole for her GERD symptoms.         Pain:   In regards to pain she had followed initially in our pain clinic for management of bulging disk as well as chronic abdominal pain for which she previously had local injections. Ms. Loo shares that much of her abdominal pain is a lot better after BAUDILIO and her first SB resection.  She was off of all pain medications except Tylenol and methycarbomal (for back pain) until her surgery and hospitalization for management of her perforation. She's continued to have pain at both her G and J-tube site.      She follows with [facility] at this time. She is enrolled in spinal cord stimulator study and hoping thi helps with pain. She was told this may have some impact on her nausea as well.     Today, not discussed.       PROBLEM LIST  Patient Active Problem List    Diagnosis Date Noted    Bacteremia 11/10/2023     Priority: Medium    Abdominal pain, unspecified abdominal location 11/09/2023     Priority: Medium    Cholangitis (H28) 06/07/2023     Priority: Medium    On total parenteral nutrition (TPN) 06/04/2023     Priority: Medium    Fever, unspecified fever cause 06/04/2023     Priority: Medium    Chronic pancreatitis, unspecified pancreatitis type (H) 06/04/2023     Priority:  Medium    History of food intolerance 05/09/2023     Priority: Medium    Malnutrition, unspecified type (H24) 05/09/2023     Priority: Medium    Nausea and vomiting, unspecified vomiting type 05/09/2023     Priority: Medium    Major depression, single episode 03/06/2023     Priority: Medium    Acute postoperative abdominal pain 01/31/2023     Priority: Medium    Feeding intolerance 12/30/2022     Priority: Medium    Myofascial pain 10/14/2022     Priority: Medium     Added automatically from request for surgery [id number]      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  pancreas and spleen removed, islet cells transplanted into liver    3 classes of vaccines needed .    1. Pneumococcal vaccines are as follows:       PCV 13 - one time dose (was given 2017)       PPSV23 - (given 12/1/16); repeat q 5 years      ROLE of PCV 20???    2. Meningococcal vaccines     Menactra - (last given 12/1/16) -  repeat q 5 yrs   NOTE: need to wait 4 wks after PCV 13 has been given.   OR - give Menveo instead as it can be given same time as PCV 13    AND   Bexcero - (got 12/9/16) - does not need to be repeated.     3. The Hib vaccine - (got 12/9/16) - does not need to be repeated.      Poisoning by drug 05/09/2022     Priority: Medium     Formatting of this note might be different from the original.  Per Care Everywhere review:  All oral, IV and injectable steroids are contraindicated (unless in life threatening situations) in [facility] [employer] transplant recipients. They can cause irreversible loss of islet cell function. Please contact patient's transplant care coordinator ADI Gaffney RN at [phone]/pager [phone] and/or endocrinologist prior to administration.      Type 1 diabetes mellitus without complication (H) 05/09/2022     Priority: Medium     Formatting of this note might be different from the original.  ACTUALLY - DM 3c - pathogenic diabetes as  no pancreas.  (pancreas and spleen removed, islet cells transplanted into liver)    Seeing ENDO at [facility] - Dr Erum Melissa      Intra-abdominal abscess (H) 12/03/2021     Priority: Medium    Postprocedural intraabdominal abscess 12/03/2021     Priority: Medium    Gastrostomy tube obstruction (H) 11/27/2021     Priority: Medium    Abdominal pain, generalized 09/18/2021     Priority: Medium    Adult failure to thrive 08/16/2021     Priority: Medium     Added automatically from request for surgery [id number]      Hypoglycemia 08/05/2021     Priority: Medium    Iron deficiency anemia 03/22/2019     Priority: Medium    Headache, chronic migraine without aura 09/18/2018     Priority: Medium    Chronic pain syndrome 09/18/2018     Priority: Medium    S/P hernia repair 08/23/2018     Priority: Medium    Incisional hernia, without obstruction or gangrene 05/20/2018     Priority: Medium    Adhesive capsulitis of shoulder, unspecified laterality 11/14/2017     Priority: Medium    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  Excess IgG4  [facility]      Gastroparesis      Priority: Medium    ACP (advance care planning) 03/28/2017     Priority: Medium     Advance Care Planning 3/28/2017: Receipt of ACP document:  Received: Health Care Directive which was witnessed or notarized on 12/8/16.  Document previously scanned on 1/12/17.  Validation form completed and scanned.  Code Status reflects choices in most recent ACP document..  Confirmed/documented designated decision maker(s).  Added by May Baires   Advance Care Planning Liaison              Pancreatic insufficiency 01/17/2017     Priority: Medium    Diabetes insipidus (H24) 01/05/2017     Priority: Medium     Formatting of this note might be different from the original.  Diabetes Insipidus  (DI)  Per external records.  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  Depression NOS      Other type of intractable migraine      Priority: Medium     Diagnosis updated by automated process. Provider to review and confirm.      GERD (gastroesophageal reflux disease) 12/01/2010     Priority: Medium    Lumbago 04/18/2005     Priority: Medium     History of L5-S1 degenerative disk disease.        Sensorineural hearing loss 01/10/2005     Priority: Medium     Problem list name updated by automated process. Provider to review      Vertiginous syndrome and labyrinthine disorder 01/10/2005     Priority: Medium     Problem list name updated by automated process. Provider to review  IMO Regulatory Load OCT 2020      Sprain and strain of other specified sites of hip and thigh 12/09/2002     Priority: Medium    Chondromalacia of patella 12/09/2002     Priority: Medium    Need for prophylactic immunotherapy      Priority: Medium     trees, grass, srw, dust mites, cat      Allergic rhinitis due to other allergen 12/21/2001     Priority: Medium    Hypothyroidism      Priority: Medium     Problem list name updated by automated process. Provider to review         PERTINENT MEDICATIONS:  Current Outpatient Medications   Medication    acetaminophen (TYLENOL) 325 MG/10.15ML solution    amitriptyline (ELAVIL) 10 MG tablet    baclofen (LIORESAL) 10 MG tablet    buprenorphine (SUBUTEX) 2 MG SUBL sublingual tablet    cetirizine (ZYRTEC) 10 MG tablet    COMPOUNDED NON-CONTROLLED SUBSTANCE (CMPD RX) - PHARMACY TO MIX COMPOUNDED MEDICATION    COMPRO 25 MG  "suppository    Digestive Enzyme Cartridge (RELIZORB) MARCO    diphenhydrAMINE (BENADRYL) 12.5 MG/5ML solution    droNABinol (SYNDROS) 5 MG/ML oral soln    estradiol (VAGIFEM) 10 MCG TABS vaginal tablet    famotidine (PEPCID) 40 MG/5ML suspension    glucagon 1 MG kit    glucose 40 % GEL gel    insulin aspart (NOVOLOG PEN) 100 UNIT/ML pen    Insulin Glargine-yfgn (SEMGLEE, YFGN,) 100 UNIT/ML SOPN    insulin syringe-needle U-100 (30G X 1/2\" 0.3 ML) 30G X 1/2\" 0.3 ML miscellaneous    lactated ringers infusion    levothyroxine (SYNTHROID/LEVOTHROID) 137 MCG tablet    lidocaine (LIDODERM) 5 % patch    lipase-protease-amylase (CREON) 47415-44514-443576 units CPEP per EC capsule    methocarbamol (ROBAXIN) 750 MG tablet    MOTEGRITY 2 MG tablet    Nutritional Supplements (BOOST HIGH PROTEIN) LIQD    pantoprazole (PROTONIX) 40 mg IV push injection    prochlorperazine (COMPAZINE) 10 MG tablet    promethazine 25 mg    rimegepant (NURTEC) 75 MG ODT tablet    scopolamine (TRANSDERM) 1 MG/3DAYS 72 hr patch    sennosides (SENOKOT) 8.8 MG/5ML syrup    Sharps Container (BD SHARPS ) MISC    silver nitrate (ARZOL SILVER NIT APPLICATORS) 75-25 % miscellaneous    sodium phosphate, mineral oil, magnesium citrate enema    STATIN NOT PRESCRIBED (INTENTIONAL)    zinc oxide - white petrolatum (CRITIC-AID THICK MOIST BARRIER) 20-51% PSTE topical paste    ZOLMitriptan (ZOMIG-ZMT) 5 MG ODT     Current Facility-Administered Medications   Medication    Botulinum Toxin Type A (BOTOX) 200 units injection 155 Units         PHYSICAL EXAMINATION:  Vitals /77   Pulse 103   Ht 1.727 m (5' 8\")   Wt 56.2 kg (124 lb)   SpO2 97%   BMI 18.85 kg/m     Wt   Wt Readings from Last 2 Encounters:   03/06/24 56.2 kg (124 lb)   02/05/24 58.4 kg (128 lb 12.8 oz)      Gen: Pt sitting up in NAD, interactive and cooperative on exam  Eyes: sclerae anicteric, no injection  ENT:  MMM  Resp: Breathing comfortably on exam, speaking in full sentences  Skin: " No jaundice  Neuro: alert, oriented, answers questions appropriately        PERTINENT STUDIES:    Office Visit on 09/07/2023   Component Date Value Ref Range Status    Hemoglobin A1C POCT 09/07/2023 6.3  4.3 - <5.7 % Final       Again, thank you for allowing me to participate in the care of your patient.      Sincerely,    Vijaya Genao MD

## 2024-03-06 NOTE — PATIENT INSTRUCTIONS
- decrease pink lady enemas to once every day to every other day  - continue to administer on your left side and rotate, holding enema in for at least 15 minutes  - continue your current oral/feeding tube bowel regimen  - discuss Movantik with your pain clinic team, we can consider a trial to see if this improves things at all  - schedule a gastrograffin enema at your earliest convenience  - contact GI clinic if having marked pain or no response to home bowel regimen, we can consider sooner GGE enema  - referral to colorectal surgery clinic  - check in as discussed with your primary care clinic regarding medication for situational depression  - continue your excellent work with pelvic floor PT  - follow-up in GI clinic as planned in two weeks     If you have any questions, please don't hesitate to contact me through our GI RN Clinic Coordinator, Ashley Mane, at [phone].

## 2024-03-06 NOTE — NURSING NOTE
"Chief Complaint   Patient presents with    Follow Up       Vitals:    03/06/24 1002   BP: 121/77   Pulse: 103   SpO2: 97%   Weight: 56.2 kg (124 lb)   Height: 1.727 m (5' 8\")       Body mass index is 18.85 kg/m .    [employer]    "

## 2024-03-06 NOTE — PROGRESS NOTES
Pt very well known to us, seen in conjunction w Dr Genao. My contribution: Patient obvious depressed, tearful.     Spent 30 minutes in person w pt, , Dr HITCHCOCK.     My suggestion  See PCP about starting antidepressant: Cymbalta might be ideal, as works for depression and pain, selective serotonin reuptake inhibitor/SNRI      Past Medical, Surgical, Family, and Social Histories:    Past Medical History:   Diagnosis Date    Allergic rhinitis, cause unspecified     Allergy to other foods     strawberries, apples, celeries, alice, watermelon    Arthritis     left knee    Choledocholithiasis     long after cholecystectomy    Chronic abdominal pain     Chronic constipation     Chronic nausea     Chronic pancreatitis (H)     Degeneration of lumbar or lumbosacral intervertebral disc     Esophageal reflux     w/ hiatal hernia    Gastroparesis     Hiatal hernia     History of pituitary adenoma     s/p resection    Hypothyroidism     Migraines     Mild hyperlipidemia     On tube feeding diet     presence of GJ tube    Pancreatic disease     PD stricture, 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 endometriosis growth    APPENDECTOMY       SECTION  1993    COLONOSCOPY  2014    COLONOSCOPY N/A 2023    Procedure: COLONOSCOPY, WITH POLYPECTOMY AND BIOPSY;  Surgeon: Percy Chamorro MD;  Location: [facility]    ENDOSCOPIC INSERTION TUBE GASTROSTOMY N/A 2021    Procedure: Gastrojejonostomy placement with jejunopexy, PEG tube exchange;  Surgeon: Zackery Montoya MD;  [facility]    ENDOSCOPIC RETROGRADE CHOLANGIOPANCREATOGRAM N/A 2015    Procedure: ENDOSCOPIC RETROGRADE  CHOLANGIOPANCREATOGRAM;  Surgeon: Mandeep Park MD;  [facility]    ENDOSCOPIC RETROGRADE CHOLANGIOPANCREATOGRAM N/A 02/09/2016    Procedure: COMBINED ENDOSCOPIC RETROGRADE CHOLANGIOPANCREATOGRAPHY, PLACE TUBE/STENT;  Surgeon: Mandeep Park MD;  [facility]    ENDOSCOPIC RETROGRADE CHOLANGIOPANCREATOGRAM N/A 03/17/2016    Procedure: COMBINED ENDOSCOPIC RETROGRADE CHOLANGIOPANCREATOGRAPHY, REMOVE FOREIGN BODY OR STENT/TUBE;  Surgeon: Mandeep Park MD;  [facility]    ENDOSCOPIC RETROGRADE CHOLANGIOPANCREATOGRAM N/A 08/02/2016    Procedure: COMBINED ENDOSCOPIC RETROGRADE CHOLANGIOPANCREATOGRAPHY, PLACE TUBE/STENT;  Surgeon: Mandeep Park MD;  [facility]    ENDOSCOPIC RETROGRADE CHOLANGIOPANCREATOGRAM N/A 08/26/2016    Procedure: COMBINED ENDOSCOPIC RETROGRADE CHOLANGIOPANCREATOGRAPHY, REMOVE FOREIGN BODY OR STENT/TUBE;  Surgeon: Mandeep Park MD;  Location:  OR    ENDOSCOPIC ULTRASOUND UPPER GASTROINTESTINAL TRACT (GI) N/A 10/03/2016    Procedure: ENDOSCOPIC ULTRASOUND, ESOPHAGOSCOPY / UPPER GASTROINTESTINAL TRACT (GI);  Surgeon: Guru Jose Rodas MD;  Location:  OR    ENT SURGERY  1989    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    ENTEROSCOPY SMALL BOWEL N/A 02/03/2022    Procedure: ENTEROSCOPY with possible fistula closure;  Surgeon: Francisco Dodson MD;  Location:  GI    ESOPHAGOSCOPY, GASTROSCOPY, DUODENOSCOPY (EGD), COMBINED N/A 06/24/2015    Procedure: COMBINED ESOPHAGOSCOPY, GASTROSCOPY, DUODENOSCOPY (EGD), REMOVE FOREIGN BODY;  Surgeon: Mandeep Park MD;  Location:  GI    ESOPHAGOSCOPY, GASTROSCOPY, DUODENOSCOPY (EGD), COMBINED N/A 10/25/2015    Procedure: COMBINED ESOPHAGOSCOPY, GASTROSCOPY, DUODENOSCOPY (EGD);  Surgeon: Sammy Amaro MD;  Location:  GI    ESOPHAGOSCOPY, GASTROSCOPY, DUODENOSCOPY (EGD), COMBINED N/A 10/25/2015    Procedure: COMBINED ESOPHAGOSCOPY, GASTROSCOPY, DUODENOSCOPY (EGD), BIOPSY  SINGLE OR MULTIPLE;  Surgeon: Sammy Amaro MD;  [facility]    ESOPHAGOSCOPY, GASTROSCOPY, DUODENOSCOPY (EGD), COMBINED N/A 01/31/2023    Procedure: ESOPHAGOGASTRODUODENOSCOPY (EGD) with peg replacement ;  Surgeon: Mandeep Park MD;  [facility]    ESOPHAGOSCOPY, GASTROSCOPY, DUODENOSCOPY (EGD), DILATATION, COMBINED      EXCISE LESION TRUNK N/A 04/17/2017    Procedure: EXCISE LESION TRUNK;  Removal of Abdominal Foreign Body;  Surgeon: Nestor Phoenix MD;  [facility]    HC ESOPH/GAS REFLUX TEST W NASAL IMPED >1 HR N/A 11/19/2015    Procedure: ESOPHAGEAL IMPEDENCE FUNCTION TEST WITH 24 HOUR PH GREATER THAN 1 HOUR;  Surgeon: Thiago Apple MD;  Location: [facility]    HC UGI ENDOSCOPY DIAG W BIOPSY  09/17/2008    HC UGI ENDOSCOPY DIAG W BIOPSY  09/27/2012    HC UGI ENDOSCOPY W ESOPHAGEAL DILATION BALLOON <30MM  09/17/2008    HC UGI ENDOSCOPY W EUS N/A 05/05/2015    Procedure: COMBINED ENDOSCOPIC ULTRASOUND, ESOPHAGOSCOPY, GASTROSCOPY, DUODENOSCOPY (EGD);  Surgeon: Wm uDeñas MD;  Location: [facility]    HC WRIST ARTHROSCOP,RELEASE XVERS LIG Bilateral 12/17/2008    INJECT TRANSVERSUS ABDOMINIS PLANE (TAP) BLOCK BILATERAL Left 09/22/2016    Procedure: INJECT TRANSVERSUS ABDOMINIS PLANE (TAP) BLOCK BILATERAL;  Surgeon: Dickson Corrigan MD;  [facility]    INJECT TRIGGER POINT Bilateral 09/08/2022    Procedure: Abdominal trigger point injection with ultrasound;  Surgeon: Monika Mahajan MD;  Location: [facility]    INJECT TRIGGER POINT SINGLE / MULTIPLE 3 OR MORE MUSCLES Right 11/15/2022    Procedure: Trigger point injections right abdomen with ultrasound guidance;  Surgeon: Monika Mahajan MD;  Location: [facility]    IR CHEST PORT PLACEMENT < 5 YRS OF AGE  06/10/2022    IR PORT REMOVAL RIGHT  11/09/2023    laparoscopic pineda  01/01/1995    LAPAROSCOPIC HERNIORRHAPHY INCISIONAL N/A 08/23/2018    Procedure: LAPAROSCOPIC HERNIORRHAPHY INCISIONAL;  Laparoscopic Incisional Hernia Repair with  Symbotex Mesh Implant;  Surgeon: Nestor Phoenix MD;  [facility]    LAPAROSCOPIC PANCREATECTOMY, TRANSPLANT AUTO ISLET CELL N/A 12/28/2016    Procedure: LAPAROSCOPIC PANCREATECTOMY, TRANSPLANT AUTO ISLET CELL;  Surgeon: Nestor Phoenix MD;  [facility]    LAPAROTOMY EXPLORATORY N/A 01/31/2023    Procedure: Exploratory Laparotomy, lysis of adhesions, Perforated J-Junostomy Resection, Replace J-Junostomy site;  Surgeon: Elver Bauer MD;  [facility]    LAPAROTOMY, LYSIS ADHESIONS, COMBINED N/A 01/31/2023    Procedure: Dilatation of jejunostomy feeding tube, track with placement of jejunostomy tube with fluoroscopy;  Surgeon: Elver Bauer MD;  [facility]    PICC DOUBLE LUMEN PLACEMENT Left 11/13/2023    Basilic Vein 5F DL 43 cm    REMOVE AND REPLACE BREAST IMPLANT PROSTHESIS N/A 12/30/2022    Procedure: Exploratory Laparotomy, lysis of adhesions, small bowel resection. Placement of gastric jejunostomy for enteral feeding.;  Surgeon: Elver Bauer MD;  [facility]    REMOVE GASTROSTOMY TUBE ADULT N/A 01/28/2022    Procedure: REMOVAL, GASTROSTOMY TUBE, ADULT;  Surgeon: Mandeep Park MD;  [facility]    REPLACE GASTROJEJUNOSTOMY TUBE, PERCUTANEOUS N/A 09/07/2021    Procedure: GASTROJEJUNOSTOMY TUBE PLACEMENT, PERCUTANEOUS, WITH GASTROPEXY;  Surgeon: Mandeep Park MD;  [facility]    REPLACE GASTROJEJUNOSTOMY TUBE, PERCUTANEOUS N/A 09/22/2021    Procedure: REPLACEMENT, GASTROJEJUNOSTOMY TUBE, PERCUTANEOUS;  Surgeon: Zackery Montoya MD;  [facility]    REPLACE GASTROJEJUNOSTOMY TUBE, PERCUTANEOUS N/A 11/22/2022    Procedure: REPLACEMENT, GASTROJEJUNOSTOMY TUBE, PERCUTANEOUS;  Surgeon: Mandeep Park MD;  [facility]    REPLACE GASTROJEJUNOSTOMY TUBE, PERCUTANEOUS N/A 12/09/2022    Procedure: REPLACEMENT, GASTROJEJUNOSTOMY TUBE, PERCUTANEOUS with ENDOSCOPIC SUTURING.;  Surgeon: Mandeep Park MD;  [facility]     REPLACE GASTROJEJUNOSTOMY TUBE, PERCUTANEOUS N/A 2023    Procedure: Replace Gastrojejunostomy Tube, Percutaneous;  Surgeon: Mandeep Park MD;  [facility]    REPLACE GASTROJEJUNOSTOMY TUBE, PERCUTANEOUS N/A 2023    Procedure: Replace Gastrojejunostomy Tube, Percutaneous;  Surgeon: Francisco Dodson MD;  [facility]    REPLACE GASTROJEJUNOSTOMY TUBE, PERCUTANEOUS N/A 2023    Procedure: Replace Gastrojejunostomy Tube, Percutaneous;  Surgeon: Mandeep Park MD;  [facility]    REPLACE JEJUNOSTOMY TUBE, PERCUTANEOUS N/A 09/10/2021    Procedure: UPPER ENDOSCOPY, REPLACEMENT OF PERCUTANEOUS GASTROJEJUNOSTOMY TUBE, T-TAG GASTROPEXY;  Surgeon: Zackery Montoya MD;  [facility]    REPLACE JEJUNOSTOMY TUBE, PERCUTANEOUS N/A 2021    Procedure: REPLACEMENT, JEJUNOSTOMY TUBE, PERCUTANEOUS;  Surgeon: Mandeep Park MD;  [facility]    REPLACE JEJUNOSTOMY TUBE, PERCUTANEOUS N/A 2023    Procedure: REPLACEMENT, JEJUNOSTOMY TUBE, PERCUTANEOUS;  Surgeon: Mandeep Park MD;  [facility]    REPLACE JEJUNOSTOMY TUBE, PERCUTANEOUS  2023    Procedure: Replace Jejunostomy Tube, Percutaneous;  Surgeon: Mandeep Park MD;  Location: [facility]    transphenoidal pituitary resection  1990    [facility]  DELIVERY ONLY  1996    [facility]  DELIVERY ONLY  1998    repeat c section with incidental cystotomy with repair    [facility] EXCIS PITUITARY,TRANSNASAL/SEPTAL  1980    pituitary tumor removed for diabetes insipidus    [facility] TOTAL ABDOM HYSTERECTOMY  1999    w/ bilateral salpingoophorectomy        Family History   Problem Relation Age of Onset    Lipids Mother     Hypertension Mother     Thyroid Disease Mother     Depression Mother     Angina Mother     GERD Mother     Skin Cancer Mother     Migraines Mother     Autoimmune Disease Mother     Hyperlipidemia Mother     Mental Illness Mother     Cerebrovascular Disease Mother          11/2023    Other Cancer Mother         skin-basil cell    Anxiety Disorder Mother     Eye Disorder Father         cataract, detached retina    Myocardial Infarction Father 60    Lipids Father     Cerebrovascular Disease Father     Depression Father     Substance Abuse Father     Anesthesia Reaction Father         stroke right after surgery    Cataracts Father     Osteoarthritis Father     Ulcerative Colitis Father     Autoimmune Disease Father     Heart Disease Father     Hyperlipidemia Father     Mental Illness Father     Other Cancer Father         myloma    Anxiety Disorder Father     Interpersonal Violence Sister     Coronary Artery Disease Sister         angioplasty    GERD Sister     Substance Abuse Sister     Cerebrovascular Disease Sister         2005    Depression Sister     Thyroid Disease Sister     Eye Disorder Maternal Grandmother         cataract    Thyroid Disease Maternal Grandmother     Diabetes Maternal Grandfather     Eye Disorder Paternal Grandmother         cataract    Diabetes Paternal Grandmother     Eye Disorder Paternal Grandfather         cataract    Diabetes Paternal Grandfather     Substance Abuse Paternal Grandfather     Eye Disorder Son         ptosis    Depression Son     Anxiety Disorder Son     Heart Disease Paternal Aunt     Diabetes Paternal Aunt     Diabetes Paternal Uncle     Heart Disease Paternal Uncle     Depression Nephew     Anxiety Disorder Nephew     Thyroid Disease Nephew     Diabetes Type 2  Cousin         paternal cousin    Autoimmune Disease Cousin     Autoimmune Disease Sister     Depression Sister     Mental Illness Sister     Substance Abuse Sister     Thyroid Disease Sister     Depression Son     Mental Illness Son     Anxiety Disorder Son     Thyroid Disease Nephew     Anxiety Disorder Nephew        Social History     Tobacco Use    Smoking status: Former     Packs/day: 0.50     Years: 6.00     Additional pack years: 0.00     Total pack years: 3.00      Types: Cigarettes     Start date: 1985     Quit date: 1992     Years since quittin.2    Smokeless tobacco: Not on file    Tobacco comments:     no 2nd hand   Substance Use Topics    Alcohol use: Not Currently     Alcohol/week: 3.0 - 6.0 standard drinks of alcohol     Types: 1 - 2 Glasses of wine, 1 - 2 Cans of beer, 1 - 2 Shots of liquor per week     Comment: none since [facility]

## 2024-03-06 NOTE — PROGRESS NOTES
"GI CLINIC VISIT    CC: follow-up      ASSESSMENT/PLAN:  (K31.84) Gastroparesis  (K59.00) Constipation, unspecified constipation type  (Z93.4) Small bowel tube feeding (H)  (K21.9) Gastroesophageal reflux disease without esophagitis  (Z94.83) Status post pancreas transplantation (H)      As noted previously, severe gastroparesis, with marked nausea/vomiting, pain, inability to tolerate po intake. Unresponsive to medications (promotility agents, domperidone, antiemetic regimen, etc). Continues with G-tube for venting (currently near continuous). With surgical J-tube for TFs which she is generally tolerating. Not currently taking po due to symptoms (pain, fullness, nausea, vomiting). Continue with antiemetics, has some IV through [facility]. Future consideration could be hydroxyzine.       Constipation seems to have been driving more recent severe symptoms with improvement in discomfort after extensive bowel evacuation. Would adjust regimen as outlined below including backing off on enema frequency and consideration of use of Movantik given increased pain medication use; would like to ensure pain team is in agreement (script can come from them or GI clinic). Plan for GGE before next planned travel and discussion      Dr. Mandeep Park was able to stop into the visit and talk with Ms. Loo as well. Understandably, with persisting symptoms and time-consuming home cares, her QOL is suffering and she has lost her \"reserve\". They did discuss treating and supporting any component of situational depression while medical cares are adjusted.      - decrease pink lady enemas to once every day to every other day  - continue to administer on your left side and rotate, holding enema in for at least 15 minutes  - continue your current oral/feeding tube bowel regimen  - discuss Movantik with your pain clinic team, we can consider a trial to see if this improves things at all  - schedule a gastrograffin enema at your earliest " convenience  - contact GI clinic if having marked pain or no response to home bowel regimen, we can consider sooner GGE enema  - referral to colorectal surgery clinic  - check in as discussed with your primary care clinic regarding medication for situational depression  - follow-up in GI clinic as planned in two weeks         It was a pleasure to participate in the care of this patient; please contact us with any further questions.  A total of 79  face-to-face minutes was spent with this patient, >50% of which was counseling regarding the above delineated issues. An additional 11 minutes was spent on the date of the encounter doing chart review, documentation, and further activities as noted above. Additional (unbilled) time was spent in the days prior and after in care coordination and record review.     Vijaya Genao MD   of Medicine  Division of Gastroenterology, Hepatology and Nutrition  [facility]    ---------------------------------------------------------------------------------------------------  HPI:      Ms. 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 She was initially seen in general GI clinic by this writer on 11/1/2017 and has been/ seen by myself and ANA Gan since that time. Her pancreas txplt surgeon was Dr. Phoenix. She also follows with Dr. Park of GI in [facility]. Her last visit with me was 10/4/2023, though she has been seen in Pancreas Clinic in the interim.       Since her last visit, Ms. Loo expresses concern and frustration at how unwell she has been feeling (see  constipation below) despite uses of her home meds and action plan. She has struggled with impact on QOL and inability to work anymore or due the activities she enjoys, such as hiking, traveling.      History summarized and updated from previous GI documentation. Please see previous notes for further details      Gastroparesis, nausea and vomiting  Venting G-tube  Tube feeds via J-tube  Initially diagnosed years ago with 2-hour study gastric emptying study at [facility]. Repeat testing here with 4-hour GES on 6/27/2016 with 49% remaining at 4 hours (?on pain meds at the time).  This was addressed with prior GJ tube in fall 2016, NG tube used for venting. Ultimately, after TPIAT, she was able to wean off of TFs and return to a regular diet.  Patient did have some persisting nausea and vomiting which she treated over the years with various medications including scopolamine patch, prochlorperazine, and promethazine. For GP in the past she had no response to metoclopramide. She has not been able to try erythromycin or azithromycin due to anaphylactic event with prior azithromycin use (in ED in 2008) She has also been seen by neurology and psychology to assist with insomnia and with migraines (and any contribution they may have to nausea).       Unfortunately, things acutely worsened in November 2020.  An ED evaluation at that time was unremarkable and CT only revealed moderate stool burden, nonspecific fluid in small intestine.  Theses episodes continued to recur.       Bowel regimen was adjusted to ensure adequate output. Dietary changes were made (freq, small meals to liquid-only) and support with antiemetics. Initially these dietary changes seemed to help, but over time the response waned. Another course of rifaximin (previously successful at addressing bloating and stool issues) was not helpful for her pain, bloating/fullness, and n/v. Amitriptyline was increased to 60 mg nightly with no change in discomfort.     From winter 2020 to May/Saba she lost about 16 lbs (from 156-->140 lbs). She continued to experience more pronounced post-prandial pain and fullness and had vomiting of food after eating. Work-up for alternate causes (with SBFT, CTE, CTA) was normal.       A GJ was placed in Sept 2021, but this was replaced a few times due to J-tube coiling in stomach as well as G-tube clogging/malfunction. On 10/19/21 she had placement of a weighted GJ but continued to struggle with discomfort at insertion site, clogging/venting issues. Ultimately a direct jejunostomy was done and after struggling with this for awhile it was removed. She worked hard to return to an oral diet.      By summer 2022, Ms. Loo had only a G-tube for venting.  At that time she was tolerating one small plate/1 cup/1 bowl of food without vomiting, 70% of the time. She would have 5-6 of these servings a day (~3508-8654 Kcal). Ms. Loo also had regular protein supplement drinks and hydration drinks. Her weight stabilized around 140 lbs. For nausea, she continued on her chronic scopolamine patch as well as prochlorperazine and promethazine about every 6-8 hours.       Later in 2022 she had progressive decline in her ability to tolerate oral intake and continued to lose weight. She was initiated on domperidone by Dr. Mandeep Park, but did not have response even with up-titration of dosing. Small bowel feeds were discussed and her G (KISHORE-KEY) was replaced with a G-J a couple times, but had issues with J-tube extension coiling. Ultimately on 12/30/22, Dr. Bauer placed a direct J in the OR; extensive BAUDILIO and a resection of 15 cm of SB with dense adhesions was also done. TFs were started and continued in earnest from that point on.      In Jan 2023, unfortunately a perforation occurred during G and J tube exchange. She went to the OR that day. Jejunal perforation was repaired with bowel resection and additional BAUDILIO was performed. She was hospitalized  thereafter and TFs were restarted.      Through early 2023, she felt better on just jejunal feeds with reduction in emesis, though still experienced nausea  for which she took: 1 scopolamine patch daily, IV promethazine once daily with J-tube promethazine at night, and compazine 10 mg - once daily. Her TFs were running at 45 mL for 18 hrs a day with oral intake is just bites of food for pleasure. She continued to vent her G-tube frequently - mostly seeing clear, colored liquid - no TFs. She continued on Relizorb digestive enzyme cartridge and did an IVF bolus via port each day.     By fall 2023,  Ms. Loo reports had one ED presentation due to symptoms concerning for SBO, though imaging was unremarkable. She stated she had two hours of diarrhea after leaving the ED and felt markedly better. At her last visit, she was   vomiting about 5 times a week. This is better with her doing 24/7 venting. She has very little in her emesis, unless it occurs shortly after her pills are given via the G-tube. In addition to her chronic antiemetics, she started buprenorphine patch and medical MJ which were helpful. She continued on J-tube feeds (for all nutritional needs, was not tolerating things by mouth).  She did feel she had leaking around the J-tube, but this has since decreased. She reiterates that she very much does not want to re-do the tube unless absolutely necessary.    Today, nausea and emesis have continued to impact any oral intake. Jejunal feeds continue (see constipation below).         Constipation/irregular bowel movements/bloating  Patient reports history of ongoing constipation for over 20 years after having her first child.  She has tried multiple interventions including regular bowel cleanouts, MiraLAX, senna, milk of magnesium, Linzess, and Amitiza for which she was on when she first came to  GI clinic.  Note, patient has been on Creon in the past (currently on Relizorb digestive enzyme cartridge).     "Amitiza had worked for awhile then lost effectiveness. She was trialed on Trulance but reported frequent loose stools in the initial days (including nocturnal stooling and incontinence) which prompted her to stop. Trial of rifaximin in the past with with improvement in bloating and stool consistency though, as stated above, a re-trial for her pain, n/v, and fullness was not successful.    Motegrity was then started with some success, though noted HAs. We discussed a possible switch in her regimen to address this, though she was most comfortable with continuing Motegrity and monitoring her symptoms.  Previously discussed combining daily constipation medications; noted patient reports cost for Amitiza was quite a lot  (running $1000+/month).     By 2022, she was on Motegrity daily as well as Miralax 1 capful BID, 4 senna a day, 1000 mg Mag citrate daily, and stool softeners. With this she would have no BMs for 3-4 days (though has gone anywhere from 1-8 days), then have a day of emptying where she has multiple BMs over a day. These were often \"enormous\" and she felt empty when complete. Noted her bowel pattern is complicated by her need for antiemetics. When she is \"backed up\" she will experience early satiety.       By late 2022, she felt BMs were improved with restart of TFs. She stopped Motegrity/prucalopride around 12/30/2022. She was taking only 2.5 mL of liquid senna BID (total 8.8 mg daily).  With this Ms. Loo, was moving her bowels 3-4 days out of a week. On the days she moved her bowels it was typically more than once (2-3 times). Stool consistency was soft, \"ribbon-like\" stools. She denied straining. When asked about HAs she did feel these were better - unclear if this is because she went off Motegrity or if due to treatment of migraines  (received Botox injections.)     In fall 2023, she's restarted Motegrity, Senna twice daily (5 mL) with Miralax prn. She continued on amitriptyline (discomfort). With " "this she had a BM 5 out of 7 days , nearly always in the morning. She may have a few more  BMs later in the day, though she's done by early afternoon. Stools were often formed, smooth, and \"sticky\", occasionally loose.  She continued on Relizorb.     Recently,  Ms. Loo has had increasing nausea, abdominal pain, and decreased stool output. Please see Needle HRt messages and telephone encounters for further details. Acute CT imaging did not reveal an SBO or other pathology. Home bowel regimen was not particularly successful. She ended up getting a soapsuds enema at her local ED with results and some improved symptoms. We pursued a therapeutic gastrograffin enema and aggressive bowel regimen to ensure colonic emptying to address worsening pain, discomfort, and nausea.     Today, she reports that with the GGE she experienced the same discomfort/fullness/heaviness that prompted her imaging and ED evaluation (though to a less severe degree). Following the GGE, she passed a ton of stool immediately afterwards which continued for a few hours, then nothing more.     She is doing pink lady enemas including holding them in and rotating her body as instructed (holds for about 30 minutes in total), though only the enema comes back out most times. About every 2-3 days she will have some brown liquid and \"mud\" consistency stool come out (puts in maybe 250 mL enema and gets about 800 mL out).  She did have fecal leakage at night which was understandably distressing to her.     She is still continuing with pelvic floor PT with [place]; reportedly working on rectal relaxation.     GERD, Dysphagia   Summarized/taken from prior documentation  Patient experiences GERD as substernal and throat burning with nocturnal relaxant causing choking. She reports a prior Schatzki's ring requiring previous dilation. Manometry in 2015 was normal, and pH impedence at that time had a DeMeester of 24 with positive symptoms association. Patient has " treated GERD symptoms with some combination of BID PPI, Carafate, H2 blocker, and Tums in the past. She'd had issues with access to liquid PPI due to insurance and liquid famotidine had not be sufficient for symptoms. Omeprazole by mouth seemed to never pass the stomach/get absorbed and would come out the G-tube when vented even when clamped for an hour or so (clamping longer led to more severe pain and symptoms).      Today, she continues with IV pantoprazole for her GERD symptoms.         Pain:   In regards to pain she had followed initially in our pain clinic for management of bulging disk as well as chronic abdominal pain for which she previously had local injections. Ms. Loo shares that much of her abdominal pain is a lot better after BAUDILIO and her first SB resection.  She was off of all pain medications except Tylenol and methycarbomal (for back pain) until her surgery and hospitalization for management of her perforation. She's continued to have pain at both her G and J-tube site.      She follows with [facility] at this time. She is enrolled in spinal cord stimulator study and hoping thi helps with pain. She was told this may have some impact on her nausea as well.     Today, not discussed.       PROBLEM LIST  Patient Active Problem List    Diagnosis Date Noted    Bacteremia 11/10/2023     Priority: Medium    Abdominal pain, unspecified abdominal location 11/09/2023     Priority: Medium    Cholangitis (H28) 06/07/2023     Priority: Medium    On total parenteral nutrition (TPN) 06/04/2023     Priority: Medium    Fever, unspecified fever cause 06/04/2023     Priority: Medium    Chronic pancreatitis, unspecified pancreatitis type (H) 06/04/2023     Priority: Medium    History of food intolerance 05/09/2023     Priority: Medium    Malnutrition, unspecified type (H24) 05/09/2023     Priority: Medium    Nausea and vomiting, unspecified vomiting type 05/09/2023     Priority: Medium    Major depression,  single episode 03/06/2023     Priority: Medium    Acute postoperative abdominal pain 01/31/2023     Priority: Medium    Feeding intolerance 12/30/2022     Priority: Medium    Myofascial pain 10/14/2022     Priority: Medium     Added automatically from request for surgery [id number]      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  pancreas and spleen removed, islet cells transplanted into liver    3 classes of vaccines needed .    1. Pneumococcal vaccines are as follows:       PCV 13 - one time dose (was given 2017)       PPSV23 - (given 12/1/16); repeat q 5 years      ROLE of PCV 20???    2. Meningococcal vaccines     Menactra - (last given 12/1/16) -  repeat q 5 yrs   NOTE: need to wait 4 wks after PCV 13 has been given.   OR - give Menveo instead as it can be given same time as PCV 13    AND   Bexcero - (got 12/9/16) - does not need to be repeated.     3. The Hib vaccine - (got 12/9/16) - does not need to be repeated.      Poisoning by drug 05/09/2022     Priority: Medium     Formatting of this note might be different from the original.  Per Care Everywhere review:  All oral, IV and injectable steroids are contraindicated (unless in life threatening situations) in [facility] [employer] transplant recipients. They can cause irreversible loss of islet cell function. Please contact patient's transplant care coordinator ADI Gaffney RN at [phone]/pager [phone] and/or endocrinologist prior to administration.      Type 1 diabetes mellitus without complication (H) 05/09/2022     Priority: Medium     Formatting of this note might be different from the original.  ACTUALLY - DM 3c - pathogenic diabetes as no pancreas.  (pancreas and spleen removed, islet cells transplanted into liver)    Seeing ENDO at [facility] - Dr Erum Melissa      Intra-abdominal abscess (H) 12/03/2021     Priority: Medium    Postprocedural intraabdominal abscess 12/03/2021      Priority: Medium    Gastrostomy tube obstruction (H) 11/27/2021     Priority: Medium    Abdominal pain, generalized 09/18/2021     Priority: Medium    Adult failure to thrive 08/16/2021     Priority: Medium     Added automatically from request for surgery [id number]      Hypoglycemia 08/05/2021     Priority: Medium    Iron deficiency anemia 03/22/2019     Priority: Medium    Headache, chronic migraine without aura 09/18/2018     Priority: Medium    Chronic pain syndrome 09/18/2018     Priority: Medium    S/P hernia repair 08/23/2018     Priority: Medium    Incisional hernia, without obstruction or gangrene 05/20/2018     Priority: Medium    Adhesive capsulitis of shoulder, unspecified laterality 11/14/2017     Priority: Medium    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  Excess IgG4  [facility]      Gastroparesis      Priority: Medium    ACP (advance care planning) 03/28/2017     Priority: Medium     Advance Care Planning 3/28/2017: Receipt of ACP document:  Received: Health Care Directive which was witnessed or notarized on 12/8/16.  Document previously scanned on 1/12/17.  Validation form completed and scanned.  Code Status reflects choices in most recent ACP document..  Confirmed/documented designated decision maker(s).  Added by May Baires   Advance Care Planning Liaison              Pancreatic insufficiency 01/17/2017     Priority: Medium    Diabetes insipidus (H24) 01/05/2017     Priority: Medium     Formatting of this note might be different from the original.  Diabetes Insipidus (DI)  Per external records.  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  Depression NOS      Other type of intractable migraine      Priority: Medium     Diagnosis updated by automated process. Provider to review and confirm.      GERD (gastroesophageal reflux disease) 12/01/2010     Priority: Medium    Lumbago 04/18/2005     Priority: Medium     History of L5-S1 degenerative disk disease.        Sensorineural hearing loss 01/10/2005     Priority: Medium     Problem list name updated by automated process. Provider to review      Vertiginous syndrome and labyrinthine disorder 01/10/2005     Priority: Medium     Problem list name updated by automated process. Provider to review  IMO Regulatory Load OCT 2020      Sprain and strain of other specified sites of hip and thigh 12/09/2002     Priority: Medium    Chondromalacia of patella 12/09/2002     Priority: Medium    Need for prophylactic immunotherapy      Priority: Medium     trees, grass, srw, dust mites, cat      Allergic rhinitis due to other allergen 12/21/2001     Priority: Medium    Hypothyroidism      Priority: Medium     Problem list name updated by automated process. Provider to review         PERTINENT MEDICATIONS:  Current Outpatient Medications   Medication    acetaminophen (TYLENOL) 325 MG/10.15ML solution    amitriptyline (ELAVIL) 10 MG tablet    baclofen (LIORESAL) 10 MG tablet    buprenorphine (SUBUTEX) 2 MG SUBL sublingual tablet    cetirizine (ZYRTEC) 10 MG tablet    COMPOUNDED NON-CONTROLLED SUBSTANCE (CMPD RX) - PHARMACY TO MIX COMPOUNDED MEDICATION    COMPRO 25 MG suppository    Digestive Enzyme Cartridge (RELIZORB) MARCO    diphenhydrAMINE (BENADRYL) 12.5 MG/5ML solution    droNABinol (SYNDROS) 5 MG/ML oral soln    estradiol (VAGIFEM) 10 MCG TABS vaginal tablet    famotidine (PEPCID) 40 MG/5ML  "suspension    glucagon 1 MG kit    glucose 40 % GEL gel    insulin aspart (NOVOLOG PEN) 100 UNIT/ML pen    Insulin Glargine-yfgn (SEMGLEE, YFGN,) 100 UNIT/ML SOPN    insulin syringe-needle U-100 (30G X 1/2\" 0.3 ML) 30G X 1/2\" 0.3 ML miscellaneous    lactated ringers infusion    levothyroxine (SYNTHROID/LEVOTHROID) 137 MCG tablet    lidocaine (LIDODERM) 5 % patch    lipase-protease-amylase (CREON) 76576-68130-872598 units CPEP per EC capsule    methocarbamol (ROBAXIN) 750 MG tablet    MOTEGRITY 2 MG tablet    Nutritional Supplements (BOOST HIGH PROTEIN) LIQD    pantoprazole (PROTONIX) 40 mg IV push injection    prochlorperazine (COMPAZINE) 10 MG tablet    promethazine 25 mg    rimegepant (NURTEC) 75 MG ODT tablet    scopolamine (TRANSDERM) 1 MG/3DAYS 72 hr patch    sennosides (SENOKOT) 8.8 MG/5ML syrup    Sharps Container (BD SHARPS ) MISC    silver nitrate (ARZOL SILVER NIT APPLICATORS) 75-25 % miscellaneous    sodium phosphate, mineral oil, magnesium citrate enema    STATIN NOT PRESCRIBED (INTENTIONAL)    zinc oxide - white petrolatum (CRITIC-AID THICK MOIST BARRIER) 20-51% PSTE topical paste    ZOLMitriptan (ZOMIG-ZMT) 5 MG ODT     Current Facility-Administered Medications   Medication    Botulinum Toxin Type A (BOTOX) 200 units injection 155 Units         PHYSICAL EXAMINATION:  Vitals /77   Pulse 103   Ht 1.727 m (5' 8\")   Wt 56.2 kg (124 lb)   SpO2 97%   BMI 18.85 kg/m     Wt   Wt Readings from Last 2 Encounters:   03/06/24 56.2 kg (124 lb)   02/05/24 58.4 kg (128 lb 12.8 oz)      Gen: Pt sitting up in NAD, interactive and cooperative on exam  Eyes: sclerae anicteric, no injection  ENT:  MMM  Resp: Breathing comfortably on exam, speaking in full sentences  Skin: No jaundice  Neuro: alert, oriented, answers questions appropriately        PERTINENT STUDIES:    Office Visit on 09/07/2023   Component Date Value Ref Range Status    Hemoglobin A1C POCT 09/07/2023 6.3  4.3 - <5.7 % Final       "

## 2024-03-07 ENCOUNTER — MYC MEDICAL ADVICE (OUTPATIENT)
Dept: INTERNAL MEDICINE | Facility: CLINIC | Age: 59
End: 2024-03-07
Payer: COMMERCIAL

## 2024-03-07 DIAGNOSIS — K59.01 SLOW TRANSIT CONSTIPATION: Primary | ICD-10-CM

## 2024-03-08 ENCOUNTER — ANCILLARY PROCEDURE (OUTPATIENT)
Dept: GENERAL RADIOLOGY | Facility: CLINIC | Age: 59
End: 2024-03-08
Attending: INTERNAL MEDICINE
Payer: COMMERCIAL

## 2024-03-08 DIAGNOSIS — Z46.59 ENCOUNTER FOR CARE RELATED TO FEEDING TUBE: ICD-10-CM

## 2024-03-08 PROCEDURE — 74019 RADEX ABDOMEN 2 VIEWS: CPT | Mod: GC | Performed by: STUDENT IN AN ORGANIZED HEALTH CARE EDUCATION/TRAINING PROGRAM

## 2024-03-08 PROCEDURE — 74248 X-RAY SM INT F-THRU STD: CPT | Mod: GC | Performed by: STUDENT IN AN ORGANIZED HEALTH CARE EDUCATION/TRAINING PROGRAM

## 2024-03-08 PROCEDURE — 74240 X-RAY XM UPR GI TRC 1CNTRST: CPT | Mod: GC | Performed by: STUDENT IN AN ORGANIZED HEALTH CARE EDUCATION/TRAINING PROGRAM

## 2024-03-08 RX ORDER — PROCHLORPERAZINE MALEATE 10 MG
10 TABLET ORAL EVERY 6 HOURS PRN
Qty: 120 TABLET | Refills: 3 | Status: SHIPPED | OUTPATIENT
Start: 2024-03-08

## 2024-03-08 NOTE — TELEPHONE ENCOUNTER
Diagnosis, Referred by & from: Assess for Colectomy due to long term constipation   Appt date: 5/15/2024   NOTES STATUS DETAILS   OFFICE NOTE from referring provider [facility]:  3/6/24, 10/4/23 - GI OV with Dr. Bonilla   OFFICE NOTE from other specialist Care Everywhere / [facility]:  3/13/24 - GI OV with Dr. Flores  12/18/23 - PANC OV with Dr. Park  9/7/23 - SOT OV with Dr. Melissa  3/30/23 - GEN SURG OV with Dr. Bauer  6/20/22 - WOUND OV with Aleyda Ceja RN    [facility]:  2/6/24 - PMR OV with Dr. Andrade  11/9/22 - Infusion   DISCHARGE SUMMARY from hospital [facility]:  11/8/23 - Admission with Dr. Dixon  8/1/23 - Admission with Dr. Garibay  * Additional in [facility]   DISCHARGE REPORT from the ER Care Everywhere / [facility]:  2/15/24 - ED OV with Dr. Santiago  12/18/22 - ED OV with ANA Iverson  * Additional in Care Everywhere    [place]:  8/18/23 - ED OV with Dr. Armijo    [facility]:  8/7/21 - ED OV with Dr. Davila   OPERATIVE REPORT [facility]:  12/8/23, 11/11/23, 8/1/23 - OP Note for REPLACE G-TUBE, PERCUTANEOUS with Dr. Park  1/31/23 - OP Note for Exploratory Laparotomy, lysis of adhesions, Perforated J-Junostomy Resection, Replace J-Junostomy site with Dr. Bauer  * Additional in Epic   MEDICATION LIST Internal    LABS     BIOPSIES/PATHOLOGY RELATED TO DIAGNOSIS [facility]:  6/9/23 - Colon Biopsy (Case: [id number])   DIAGNOSTIC PROCEDURES     COLONOSCOPY (most recent all time after 5 years) [facility]:  6/9/23 - Colonoscopy   UPPER ENDOSCOPY (EGD) [facility]:  5/4/23 - EGD  1/31/23 - EGD   ERCP [facility]:  8/26/16 - ERCP   IMAGING (DISC & REPORT)      CT Received / [facility]:  2/15/24 - CT Abd/Pelvis    [facility]:  11/8/23 - CT Abd/Pelvis  5/9/23 - CT Abd/Pelvis  1/12/23 - CT Abdomen  12/10/22 - CT Abd/pelvis   MRI [facility]:  6/5/23 - MRI Abdomen MRCP   XRAY [facility]:  3/25/24 - XR Colon  3/8/24 - XR Upper GI  3/8/24 - XR  Abdomen  2/19/24 - XR Colon   ULTRASOUND  (ENDOANAL/ENDORECTAL) [facility]:  6/5/23 - US Abdomen

## 2024-03-11 ENCOUNTER — TELEPHONE (OUTPATIENT)
Dept: GASTROENTEROLOGY | Facility: CLINIC | Age: 59
End: 2024-03-11
Payer: COMMERCIAL

## 2024-03-11 DIAGNOSIS — K31.84 GASTROPARESIS: ICD-10-CM

## 2024-03-11 DIAGNOSIS — R10.84 ABDOMINAL PAIN, GENERALIZED: ICD-10-CM

## 2024-03-11 RX ORDER — FAMOTIDINE 40 MG/5ML
20 POWDER, FOR SUSPENSION ORAL DAILY
Qty: 50 ML | Refills: 4 | Status: SHIPPED | OUTPATIENT
Start: 2024-03-11 | End: 2024-07-01

## 2024-03-11 NOTE — TELEPHONE ENCOUNTER
[facility] Call Center    Phone Message    May a detailed message be left on voicemail: yes     Reason for Call: Other: [facility] calling about prescription for prochlorperazine (COMPAZINE) 10 MG tablet - they sent refill request for suppository. Please call pharmacy ASAP to discuss.      Action Taken: Message routed to:  [facility] ([facility]): GI    Travel Screening: Not Applicable

## 2024-03-11 NOTE — TELEPHONE ENCOUNTER
Dr. Genao ok with  the rectal form    Spoke with the pharmacist and ok for rectal compazine 25 mg q 12 hours for nausea

## 2024-03-13 ENCOUNTER — OFFICE VISIT (OUTPATIENT)
Dept: GASTROENTEROLOGY | Facility: CLINIC | Age: 59
End: 2024-03-13
Payer: COMMERCIAL

## 2024-03-13 ENCOUNTER — TELEPHONE (OUTPATIENT)
Dept: MRI IMAGING | Facility: CLINIC | Age: 59
End: 2024-03-13

## 2024-03-13 ENCOUNTER — DOCUMENTATION ONLY (OUTPATIENT)
Dept: INTERNAL MEDICINE | Facility: CLINIC | Age: 59
End: 2024-03-13

## 2024-03-13 VITALS
HEIGHT: 68 IN | DIASTOLIC BLOOD PRESSURE: 74 MMHG | WEIGHT: 125.5 LBS | OXYGEN SATURATION: 96 % | SYSTOLIC BLOOD PRESSURE: 116 MMHG | HEART RATE: 94 BPM | BODY MASS INDEX: 19.02 KG/M2

## 2024-03-13 DIAGNOSIS — K31.84 GASTROPARESIS: Primary | ICD-10-CM

## 2024-03-13 DIAGNOSIS — Z93.4 SMALL BOWEL TUBE FEEDING (H): ICD-10-CM

## 2024-03-13 DIAGNOSIS — Z94.83 STATUS POST PANCREAS TRANSPLANTATION (H): ICD-10-CM

## 2024-03-13 DIAGNOSIS — K59.00 CONSTIPATION, UNSPECIFIED CONSTIPATION TYPE: ICD-10-CM

## 2024-03-13 PROCEDURE — 99417 PROLNG OP E/M EACH 15 MIN: CPT | Performed by: STUDENT IN AN ORGANIZED HEALTH CARE EDUCATION/TRAINING PROGRAM

## 2024-03-13 PROCEDURE — 99215 OFFICE O/P EST HI 40 MIN: CPT | Performed by: STUDENT IN AN ORGANIZED HEALTH CARE EDUCATION/TRAINING PROGRAM

## 2024-03-13 ASSESSMENT — PAIN SCALES - GENERAL: PAINLEVEL: MILD PAIN (3)

## 2024-03-13 NOTE — PATIENT INSTRUCTIONS
- ok for pink lady enemas as needed, every 1-4 days  - continue sennosides BID  - continue Motegrity 2mg daily  - continue IV and per feeding tube PPI  - ok to give IV pantoprazole more slowly if having nausea  - as we discussed before, check in with your pain clinic team about Movantik, we can consider a trial to see if this improves things at all  - contact GI clinic if having marked pain or no response to home bowel regimen, we can consider sooner GGE enema  - check in as discussed with your primary care clinic regarding medication for situational depression  - continue your excellent work with pelvic floor PT  - keep up your great work on tube feeds, we'll check in with [facility] regarding possible increase in kcal  - ok for oral intake as tolerated, please continue protein drink mixed with coffee in the morning     If you have any questions, please don't hesitate to contact me through our GI RN Clinic Coordinator, Ashley Mane, at [phone].

## 2024-03-13 NOTE — LETTER
3/13/2024         RE: Dinora Mcghee  [address] [facility] [address]        Dear Colleague,    Thank you for referring your patient, Dinora Mcghee, to the [facility] [place]. Please see a copy of my visit note below.    GI CLINIC VISIT    CC: follow-up      ASSESSMENT/PLAN:  (K31.84) Gastroparesis  (K59.00) Constipation, unspecified constipation type  (Z93.4) Small bowel tube feeding (H)  (Z94.83) Status post pancreas transplantation (H)      As per my prior documentation, severe gastroparesis, with marked nausea/vomiting, pain, inability to tolerate po intake. Unresponsive to medications (promotility agents, domperidone, antiemetic regimen, etc). Continues with G-tube for venting (currently near continuous). With surgical J-tube for TFs which she is generally tolerating. Not currently taking po due to symptoms (pain, fullness, nausea, vomiting). Continue with antiemetics, has some IV through [facility]. Future consideration could be hydroxyzine. A bit better with improved stool output.       Constipation improving. Regimen adjusted as below. Considering addition of Movantik.      Will work with [facility] on tube feed regimen to see if we can increase daily calories as well as protein supplements, to avoid weight loss.     - ok for pink lady enemas as needed, every 1-4 days  - continue sennosides BID  - continue Motegrity 2mg daily  - continue IV and per feeding tube PPI  - ok to give IV pantoprazole more slowly if having nausea  - as we discussed before, check in with your pain clinic team about Movantik, we can consider a trial to see if this improves things at all  - contact GI clinic if having marked pain or no response to home bowel regimen, we can consider sooner GGE enema  - check in as discussed with your primary care clinic regarding medication for situational depression  - continue your excellent work with pelvic floor PT  - keep up your great work on tube feeds, we'll  check in with [facility] regarding possible increase in kcal  - ok for oral intake as tolerated, please continue protein drink mixed with coffee in the morning      RTC 3 months    It was a pleasure to participate in the care of this patient; please contact us with any further questions.  A total of 56 face-to-face minutes was spent with this patient, >50% of which was counseling regarding the above delineated issues.     Vijaya Genao MD   of Medicine  Division of Gastroenterology, Hepatology and Nutrition  [facility]    ---------------------------------------------------------------------------------------------------  HPI:      Ms. 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 She was initially seen in general GI clinic by this writer on 11/1/2017 and has been/ seen by myself and ANA Gan since that time. Her pancreas txplt surgeon was Dr. Phoenix. She also follows with Dr. Park of GI in [facility]. Her last visit with me was last week (3/6/2024).     Since her visit last week,  Ms. Loo reports she is a little more hopeful about how things are going. She states she was able to hike 1.5 miles and get out to an exercise class.       History summarized and updated from previous GI documentation. Please see previous notes for further details      Gastroparesis, nausea and vomiting  Venting G-tube  Tube feeds via J-tube  Initially diagnosed years ago with 2-hour study gastric emptying study at [facility]. Repeat testing here with 4-hour GES on 6/27/2016 with 49% remaining at 4 hours (?on  pain meds at the time).  This was addressed with prior GJ tube in fall 2016, NG tube used for venting. Ultimately, after TPIAT, she was able to wean off of TFs and return to a regular diet.  Patient did have some persisting nausea and vomiting which she treated over the years with various medications including scopolamine patch, prochlorperazine, and promethazine. For GP in the past she had no response to metoclopramide. She has not been able to try erythromycin or azithromycin due to anaphylactic event with prior azithromycin use (in ED in 2008) She has also been seen by neurology and psychology to assist with insomnia and with migraines (and any contribution they may have to nausea).       Unfortunately, things acutely worsened in November 2020.  An ED evaluation at that time was unremarkable and CT only revealed moderate stool burden, nonspecific fluid in small intestine.  Theses episodes continued to recur.       Bowel regimen was adjusted to ensure adequate output. Dietary changes were made (freq, small meals to liquid-only) and support with antiemetics. Initially these dietary changes seemed to help, but over time the response waned. Another course of rifaximin (previously successful at addressing bloating and stool issues) was not helpful for her pain, bloating/fullness, and n/v. Amitriptyline was increased to 60 mg nightly with no change in discomfort.    From winter 2020 to May/Saba she lost about 16 lbs (from 156-->140 lbs). She continued to experience more pronounced post-prandial pain and fullness and had vomiting of food after eating. Work-up for alternate causes (with SBFT, CTE, CTA) was normal.       A GJ was placed in Sept 2021, but this was replaced a few times due to J-tube coiling in stomach as well as G-tube clogging/malfunction. On 10/19/21 she had placement of a weighted GJ but continued to struggle with discomfort at insertion site, clogging/venting issues. Ultimately a direct jejunostomy was  done and after struggling with this for awhile it was removed. She worked hard to return to an oral diet.      By summer 2022, Ms. Loo had only a G-tube for venting.  At that time she was tolerating one small plate/1 cup/1 bowl of food without vomiting, 70% of the time. She would have 5-6 of these servings a day (~2366-9265 Kcal). Ms. Loo also had regular protein supplement drinks and hydration drinks. Her weight stabilized around 140 lbs. For nausea, she continued on her chronic scopolamine patch as well as prochlorperazine and promethazine about every 6-8 hours.       Later in 2022 she had progressive decline in her ability to tolerate oral intake and continued to lose weight. She was initiated on domperidone by Dr. Mandeep Park, but did not have response even with up-titration of dosing. Small bowel feeds were discussed and her G (KISHORE-KEY) was replaced with a G-J a couple times, but had issues with J-tube extension coiling. Ultimately on 12/30/22, Dr. Bauer placed a direct J in the OR; extensive BAUDILIO and a resection of 15 cm of SB with dense adhesions was also done. TFs were started and continued in earnest from that point on.      In Jan 2023, unfortunately a perforation occurred during G and J tube exchange. She went to the OR that day. Jejunal perforation was repaired with bowel resection and additional BAUDILIO was performed. She was hospitalized thereafter and TFs were restarted.      Through early 2023, she felt better on just jejunal feeds with reduction in emesis, though still experienced nausea  for which she took: 1 scopolamine patch daily, IV promethazine once daily with J-tube promethazine at night, and compazine 10 mg - once daily. Her TFs were running at 45 mL for 18 hrs a day with oral intake is just bites of food for pleasure. She continued to vent her G-tube frequently - mostly seeing clear, colored liquid - no TFs. She continued on Relizorb digestive enzyme cartridge and did an IVF bolus  via port each day.     By fall 2023,  Ms. Loo reports had one ED presentation due to symptoms concerning for SBO, though imaging was unremarkable. She stated she had two hours of diarrhea after leaving the ED and felt markedly better. At her last visit, she was   vomiting about 5 times a week. This is better with her doing 24/7 venting. She has very little in her emesis, unless it occurs shortly after her pills are given via the G-tube. In addition to her chronic antiemetics, she started buprenorphine patch and medical MJ which were helpful. She continued on J-tube feeds (for all nutritional needs, was not tolerating things by mouth).  She did feel she had leaking around the J-tube, but this has since decreased. She reiterates that she very much does not want to re-do the tube unless absolutely necessary.     By early 2024, nausea and emesis have continued to impact any oral intake. Jejunal feeds continue (see constipation below) and symptoms worsened when her bowels were backed up (see constipation below).     Today, emesis is a bit better, it continues but she is able to tolerate some oral intake. She has nausea each day, though she is vomiting only about 3 days a week.    For TFs she is getting in 4 cans/day, 1500 kcal a day, running at 90 mL/hr for 11 hours, eating a small amount. She is worried she is losing weight again.      Constipation/irregular bowel movements/bloating  Patient reports history of ongoing constipation for over 20 years after having her first child.  She has tried multiple interventions including regular bowel cleanouts, MiraLAX, senna, milk of magnesium, Linzess, and Amitiza for which she was on when she first came to  GI clinic.  Note, patient has been on Creon in the past (currently on Relizorb digestive enzyme cartridge).    Amitiza had worked for awhile then lost effectiveness. She was trialed on Trulance but reported frequent loose stools in the initial days (including nocturnal  "stooling and incontinence) which prompted her to stop. Trial of rifaximin in the past with with improvement in bloating and stool consistency though, as stated above, a re-trial for her pain, n/v, and fullness was not successful.    Motegrity was then started with some success, though noted HAs. We discussed a possible switch in her regimen to address this, though she was most comfortable with continuing Motegrity and monitoring her symptoms.  Previously discussed combining daily constipation medications; noted patient reports cost for Amitiza was quite a lot  (running $1000+/month).     By 2022, she was on Motegrity daily as well as Miralax 1 capful BID, 4 senna a day, 1000 mg Mag citrate daily, and stool softeners. With this she would have no BMs for 3-4 days (though has gone anywhere from 1-8 days), then have a day of emptying where she has multiple BMs over a day. These were often \"enormous\" and she felt empty when complete. Noted her bowel pattern is complicated by her need for antiemetics. When she is \"backed up\" she will experience early satiety.       By late 2022, she felt BMs were improved with restart of TFs. She stopped Motegrity/prucalopride around 12/30/2022. She was taking only 2.5 mL of liquid senna BID (total 8.8 mg daily).  With this Ms. Loo, was moving her bowels 3-4 days out of a week. On the days she moved her bowels it was typically more than once (2-3 times). Stool consistency was soft, \"ribbon-like\" stools. She denied straining. When asked about HAs she did feel these were better - unclear if this is because she went off Motegrity or if due to treatment of migraines  (received Botox injections.)     In fall 2023, she's restarted Motegrity, Senna twice daily (5 mL) with Miralax prn. She continued on amitriptyline (discomfort). With this she had a BM 5 out of 7 days , nearly always in the morning. She may have a few more  BMs later in the day, though she's done by early afternoon. Stools " "were often formed, smooth, and \"sticky\", occasionally loose.  She continued on Relizorb.     By early 2024,  Ms. Loo has had increasing nausea, abdominal pain, and decreased stool output. Please see [employer] messages and telephone encounters for further details. Acute CT imaging did not reveal an SBO or other pathology. Home bowel regimen was not particularly successful. She ended up getting a soapsuds enema at her local ED with results and some improved symptoms. We pursued a therapeutic gastrograffin enema and aggressive bowel regimen to ensure colonic emptying to address worsening pain, discomfort, and nausea.  We then adjusted her bowel regimen with plan for periodic GGE and consideration of adding Movantik (to be discussed with pain team as well).       Today, she continues with pelvic floor PT session and feels like she is understanding the biofeedback a bit better. Sheis doing home exercises as well with a plan to check back in in a few weeks. She also notes some difference with bladder control too.      Current BMs are only loose. In the last week has had BMs on 5/7 days, about 4 of the 7 days had good amount of stool output. She has been doing enemas about every 2-3 days (just had two days without enemas, then did one today with very large output.)    GERD, Dysphagia   Summarized/taken from prior documentation  Patient experiences GERD as substernal and throat burning with nocturnal relaxant causing choking. She reports a prior Schatzki's ring requiring previous dilation. Manometry in 2015 was normal, and pH impedence at that time had a DeMeester of 24 with positive symptoms association. Patient has treated GERD symptoms with some combination of BID PPI, Carafate, H2 blocker, and Tums in the past. She'd had issues with access to liquid PPI due to insurance and liquid famotidine had not be sufficient for symptoms. Omeprazole by mouth seemed to never pass the stomach/get absorbed and would come out the " "G-tube when vented even when clamped for an hour or so (clamping longer led to more severe pain and symptoms). IV pantoprazole was ultimately started (issues digesting the pill).      Today, not discussed.         Pain:   In regards to pain she had followed initially in our pain clinic for management of bulging disk as well as chronic abdominal pain for which she previously had local injections. Ms. Loo shares that much of her abdominal pain is a lot better after BAUDILIO and her first SB resection.  She was off of all pain medications except Tylenol and methycarbomal (for back pain) until her surgery and hospitalization for management of her perforation. She's continued to have pain at both her G and J-tube site.      She follows with [facility] at this time. She is enrolled in spinal cord stimulator study and hoping thi helps with pain. She was told this may have some impact on her nausea as well.      Today, has a few sites that are bothering her:  G-tube site: sharp pain at site  Pelvis: intermittent pelvic pain described like \"labor pain\"  PICC line: hurts with dressing/tape change as well as injections  Chronic abd pain: continues on pain meds, baclofen which brings this pain down to a 3/10        PROBLEM LIST  Patient Active Problem List    Diagnosis Date Noted    Bacteremia 11/10/2023     Priority: Medium    Abdominal pain, unspecified abdominal location 11/09/2023     Priority: Medium    Cholangitis (H28) 06/07/2023     Priority: Medium    On total parenteral nutrition (TPN) 06/04/2023     Priority: Medium    Fever, unspecified fever cause 06/04/2023     Priority: Medium    Chronic pancreatitis, unspecified pancreatitis type (H) 06/04/2023     Priority: Medium    History of food intolerance 05/09/2023     Priority: Medium    Malnutrition, unspecified type (H24) 05/09/2023     Priority: Medium    Nausea and vomiting, unspecified vomiting type 05/09/2023     Priority: Medium    Major depression, single " episode 03/06/2023     Priority: Medium    Acute postoperative abdominal pain 01/31/2023     Priority: Medium    Feeding intolerance 12/30/2022     Priority: Medium    Myofascial pain 10/14/2022     Priority: Medium     Added automatically from request for surgery [id number]      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  pancreas and spleen removed, islet cells transplanted into liver    3 classes of vaccines needed .    1. Pneumococcal vaccines are as follows:       PCV 13 - one time dose (was given 2017)       PPSV23 - (given 12/1/16); repeat q 5 years      ROLE of PCV 20???    2. Meningococcal vaccines     Menactra - (last given 12/1/16) -  repeat q 5 yrs   NOTE: need to wait 4 wks after PCV 13 has been given.   OR - give Menveo instead as it can be given same time as PCV 13    AND   Bexcero - (got 12/9/16) - does not need to be repeated.     3. The Hib vaccine - (got 12/9/16) - does not need to be repeated.      Poisoning by drug 05/09/2022     Priority: Medium     Formatting of this note might be different from the original.  Per Care Everywhere review:  All oral, IV and injectable steroids are contraindicated (unless in life threatening situations) in [facility] [employer] transplant recipients. They can cause irreversible loss of islet cell function. Please contact patient's transplant care coordinator ADI Gaffney RN at [phone]/pager [phone] and/or endocrinologist prior to administration.      Type 1 diabetes mellitus without complication (H) 05/09/2022     Priority: Medium     Formatting of this note might be different from the original.  ACTUALLY - DM 3c - pathogenic diabetes as no pancreas.  (pancreas and spleen removed, islet cells transplanted into liver)    Seeing ENDO at [facility] - Dr Erum Melissa      Intra-abdominal abscess (H) 12/03/2021     Priority: Medium    Postprocedural intraabdominal abscess 12/03/2021      Priority: Medium    Gastrostomy tube obstruction (H) 11/27/2021     Priority: Medium    Abdominal pain, generalized 09/18/2021     Priority: Medium    Adult failure to thrive 08/16/2021     Priority: Medium     Added automatically from request for surgery [id number]      Hypoglycemia 08/05/2021     Priority: Medium    Iron deficiency anemia 03/22/2019     Priority: Medium    Headache, chronic migraine without aura 09/18/2018     Priority: Medium    Chronic pain syndrome 09/18/2018     Priority: Medium    S/P hernia repair 08/23/2018     Priority: Medium    Incisional hernia, without obstruction or gangrene 05/20/2018     Priority: Medium    Adhesive capsulitis of shoulder, unspecified laterality 11/14/2017     Priority: Medium    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  Excess IgG4  [facility]      Gastroparesis      Priority: Medium    ACP (advance care planning) 03/28/2017     Priority: Medium     Advance Care Planning 3/28/2017: Receipt of ACP document:  Received: Health Care Directive which was witnessed or notarized on 12/8/16.  Document previously scanned on 1/12/17.  Validation form completed and scanned.  Code Status reflects choices in most recent ACP document..  Confirmed/documented designated decision maker(s).  Added by May Baires   Advance Care Planning Liaison              Pancreatic insufficiency 01/17/2017     Priority: Medium    Diabetes insipidus (H24) 01/05/2017     Priority: Medium     Formatting of this note might be different from the original.  Diabetes Insipidus (DI)  Per external records.  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  Depression NOS      Other type of intractable migraine      Priority: Medium     Diagnosis updated by automated process. Provider to review and confirm.      GERD (gastroesophageal reflux disease) 12/01/2010     Priority: Medium    Lumbago 04/18/2005     Priority: Medium     History of L5-S1 degenerative disk disease.        Sensorineural hearing loss 01/10/2005     Priority: Medium     Problem list name updated by automated process. Provider to review      Vertiginous syndrome and labyrinthine disorder 01/10/2005     Priority: Medium     Problem list name updated by automated process. Provider to review  IMO Regulatory Load OCT 2020      Sprain and strain of other specified sites of hip and thigh 12/09/2002     Priority: Medium    Chondromalacia of patella 12/09/2002     Priority: Medium    Need for prophylactic immunotherapy      Priority: Medium     trees, grass, srw, dust mites, cat      Allergic rhinitis due to other allergen 12/21/2001     Priority: Medium    Hypothyroidism      Priority: Medium     Problem list name updated by automated process. Provider to review         PERTINENT MEDICATIONS:  Current Outpatient Medications   Medication    acetaminophen (TYLENOL) 325 MG/10.15ML solution    amitriptyline (ELAVIL) 10 MG tablet    baclofen (LIORESAL) 10 MG tablet    buprenorphine (SUBUTEX) 2 MG SUBL sublingual tablet    cetirizine (ZYRTEC) 10 MG tablet    COMPOUNDED NON-CONTROLLED SUBSTANCE (CMPD RX) - PHARMACY TO MIX COMPOUNDED MEDICATION    COMPRO 25 MG suppository    Digestive Enzyme Cartridge (RELIZORB) MARCO    diphenhydrAMINE (BENADRYL) 12.5 MG/5ML solution    droNABinol (SYNDROS) 5 MG/ML oral soln    estradiol (VAGIFEM) 10 MCG TABS vaginal tablet    famotidine (PEPCID) 40 MG/5ML  "suspension    glucagon 1 MG kit    glucose 40 % GEL gel    insulin aspart (NOVOLOG PEN) 100 UNIT/ML pen    Insulin Glargine-yfgn (SEMGLEE, YFGN,) 100 UNIT/ML SOPN    insulin syringe-needle U-100 (30G X 1/2\" 0.3 ML) 30G X 1/2\" 0.3 ML miscellaneous    lactated ringers infusion    levothyroxine (SYNTHROID/LEVOTHROID) 137 MCG tablet    lidocaine (LIDODERM) 5 % patch    lipase-protease-amylase (CREON) 97478-83841-875961 units CPEP per EC capsule    methocarbamol (ROBAXIN) 750 MG tablet    MOTEGRITY 2 MG tablet    Nutritional Supplements (BOOST HIGH PROTEIN) LIQD    pantoprazole (PROTONIX) 40 mg IV push injection    prochlorperazine (COMPAZINE) 10 MG tablet    promethazine 25 mg    rimegepant (NURTEC) 75 MG ODT tablet    scopolamine (TRANSDERM) 1 MG/3DAYS 72 hr patch    sennosides (SENOKOT) 8.8 MG/5ML syrup    Sharps Container (BD SHARPS ) MISC    silver nitrate (ARZOL SILVER NIT APPLICATORS) 75-25 % miscellaneous    sodium phosphate, mineral oil, magnesium citrate enema    STATIN NOT PRESCRIBED (INTENTIONAL)    zinc oxide - white petrolatum (CRITIC-AID THICK MOIST BARRIER) 20-51% PSTE topical paste    ZOLMitriptan (ZOMIG-ZMT) 5 MG ODT     Current Facility-Administered Medications   Medication    Botulinum Toxin Type A (BOTOX) 200 units injection 155 Units         PHYSICAL EXAMINATION:  Vitals /74   Pulse 94   Ht 1.727 m (5' 8\")   Wt 56.9 kg (125 lb 8 oz)   SpO2 96%   BMI 19.08 kg/m     Wt   Wt Readings from Last 2 Encounters:   03/13/24 56.9 kg (125 lb 8 oz)   03/06/24 56.2 kg (124 lb)      Gen: Pt sitting up in NAD, interactive and cooperative on exam  Eyes: sclerae anicteric, no injection  ENT:  MMM  Resp: Breathing comfortably on exam, speaking in full sentences  Skin: No jaundice  Neuro: alert, oriented, answers questions appropriately        PERTINENT STUDIES:    Office Visit on 09/07/2023   Component Date Value Ref Range Status    Hemoglobin A1C POCT 09/07/2023 6.3  4.3 - <5.7 % Final "       Again, thank you for allowing me to participate in the care of your patient.      Sincerely,    Maren Flores MD

## 2024-03-13 NOTE — PROGRESS NOTES
Type of Form Received:     Form Received (Date) 3/13/24   Form Filled out Yes, faxed 3/22   Placed in provider folder Yes

## 2024-03-13 NOTE — PROGRESS NOTES
GI CLINIC VISIT    CC: follow-up      ASSESSMENT/PLAN:  (K31.84) Gastroparesis  (K59.00) Constipation, unspecified constipation type  (Z93.4) Small bowel tube feeding (H)  (Z94.83) Status post pancreas transplantation (H)      As per my prior documentation, severe gastroparesis, with marked nausea/vomiting, pain, inability to tolerate po intake. Unresponsive to medications (promotility agents, domperidone, antiemetic regimen, etc). Continues with G-tube for venting (currently near continuous). With surgical J-tube for TFs which she is generally tolerating. Not currently taking po due to symptoms (pain, fullness, nausea, vomiting). Continue with antiemetics, has some IV through [facility]. Future consideration could be hydroxyzine. A bit better with improved stool output.       Constipation improving. Regimen adjusted as below. Considering addition of Movantik.      Will work with [facility] on tube feed regimen to see if we can increase daily calories as well as protein supplements, to avoid weight loss.     - ok for pink lady enemas as needed, every 1-4 days  - continue sennosides BID  - continue Motegrity 2mg daily  - continue IV and per feeding tube PPI  - ok to give IV pantoprazole more slowly if having nausea  - as we discussed before, check in with your pain clinic team about Movantik, we can consider a trial to see if this improves things at all  - contact GI clinic if having marked pain or no response to home bowel regimen, we can consider sooner GGE enema  - check in as discussed with your primary care clinic regarding medication for situational depression  - continue your excellent work with pelvic floor PT  - keep up your great work on tube feeds, we'll check in with [facility] regarding possible increase in kcal  - ok for oral intake as tolerated, please continue protein drink mixed with coffee in the morning      RTC 3 months    It was a pleasure to participate in the care of this patient; please contact us  with any further questions.  A total of 56 face-to-face minutes was spent with this patient, >50% of which was counseling regarding the above delineated issues.     Vijaya Genao MD   of Medicine  Division of Gastroenterology, Hepatology and Nutrition  [facility]    ---------------------------------------------------------------------------------------------------  HPI:      Ms. 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 She was initially seen in general GI clinic by this writer on 11/1/2017 and has been/ seen by myself and ANA Gan since that time. Her pancreas txplt surgeon was Dr. Phoenix. She also follows with Dr. Park of GI in [facility]. Her last visit with me was last week (3/6/2024).     Since her visit last week,  Ms. Loo reports she is a little more hopeful about how things are going. She states she was able to hike 1.5 miles and get out to an exercise class.       History summarized and updated from previous GI documentation. Please see previous notes for further details      Gastroparesis, nausea and vomiting  Venting G-tube  Tube feeds via J-tube  Initially diagnosed years ago with 2-hour study gastric emptying study at [facility]. Repeat testing here with 4-hour GES on 6/27/2016 with 49% remaining at 4 hours (?on pain meds at the time).  This was addressed with prior GJ tube in fall 2016, NG tube used for venting. Ultimately, after TPIAT, she was able to wean off of TFs and return to a regular diet.  Patient did have some persisting nausea and vomiting which she treated over the  years with various medications including scopolamine patch, prochlorperazine, and promethazine. For GP in the past she had no response to metoclopramide. She has not been able to try erythromycin or azithromycin due to anaphylactic event with prior azithromycin use (in ED in 2008) She has also been seen by neurology and psychology to assist with insomnia and with migraines (and any contribution they may have to nausea).       Unfortunately, things acutely worsened in November 2020.  An ED evaluation at that time was unremarkable and CT only revealed moderate stool burden, nonspecific fluid in small intestine.  Theses episodes continued to recur.       Bowel regimen was adjusted to ensure adequate output. Dietary changes were made (freq, small meals to liquid-only) and support with antiemetics. Initially these dietary changes seemed to help, but over time the response waned. Another course of rifaximin (previously successful at addressing bloating and stool issues) was not helpful for her pain, bloating/fullness, and n/v. Amitriptyline was increased to 60 mg nightly with no change in discomfort.    From winter 2020 to May/Saba she lost about 16 lbs (from 156-->140 lbs). She continued to experience more pronounced post-prandial pain and fullness and had vomiting of food after eating. Work-up for alternate causes (with SBFT, CTE, CTA) was normal.       A GJ was placed in Sept 2021, but this was replaced a few times due to J-tube coiling in stomach as well as G-tube clogging/malfunction. On 10/19/21 she had placement of a weighted GJ but continued to struggle with discomfort at insertion site, clogging/venting issues. Ultimately a direct jejunostomy was done and after struggling with this for awhile it was removed. She worked hard to return to an oral diet.      By summer 2022, Ms. Loo had only a G-tube for venting.  At that time she was tolerating one small plate/1 cup/1 bowl of food without vomiting, 70% of the  time. She would have 5-6 of these servings a day (~6761-5052 Kcal). Ms. Loo also had regular protein supplement drinks and hydration drinks. Her weight stabilized around 140 lbs. For nausea, she continued on her chronic scopolamine patch as well as prochlorperazine and promethazine about every 6-8 hours.       Later in 2022 she had progressive decline in her ability to tolerate oral intake and continued to lose weight. She was initiated on domperidone by Dr. Mandeep Park, but did not have response even with up-titration of dosing. Small bowel feeds were discussed and her G (KISHORE-KEY) was replaced with a G-J a couple times, but had issues with J-tube extension coiling. Ultimately on 12/30/22, Dr. Bauer placed a direct J in the OR; extensive BAUDILIO and a resection of 15 cm of SB with dense adhesions was also done. TFs were started and continued in earnest from that point on.      In Jan 2023, unfortunately a perforation occurred during G and J tube exchange. She went to the OR that day. Jejunal perforation was repaired with bowel resection and additional BAUDILIO was performed. She was hospitalized thereafter and TFs were restarted.      Through early 2023, she felt better on just jejunal feeds with reduction in emesis, though still experienced nausea  for which she took: 1 scopolamine patch daily, IV promethazine once daily with J-tube promethazine at night, and compazine 10 mg - once daily. Her TFs were running at 45 mL for 18 hrs a day with oral intake is just bites of food for pleasure. She continued to vent her G-tube frequently - mostly seeing clear, colored liquid - no TFs. She continued on Relizorb digestive enzyme cartridge and did an IVF bolus via port each day.     By fall 2023,  Ms. Loo reports had one ED presentation due to symptoms concerning for SBO, though imaging was unremarkable. She stated she had two hours of diarrhea after leaving the ED and felt markedly better. At her last visit, she was    vomiting about 5 times a week. This is better with her doing 24/7 venting. She has very little in her emesis, unless it occurs shortly after her pills are given via the G-tube. In addition to her chronic antiemetics, she started buprenorphine patch and medical MJ which were helpful. She continued on J-tube feeds (for all nutritional needs, was not tolerating things by mouth).  She did feel she had leaking around the J-tube, but this has since decreased. She reiterates that she very much does not want to re-do the tube unless absolutely necessary.     By early 2024, nausea and emesis have continued to impact any oral intake. Jejunal feeds continue (see constipation below) and symptoms worsened when her bowels were backed up (see constipation below).     Today, emesis is a bit better, it continues but she is able to tolerate some oral intake. She has nausea each day, though she is vomiting only about 3 days a week.    For TFs she is getting in 4 cans/day, 1500 kcal a day, running at 90 mL/hr for 11 hours, eating a small amount. She is worried she is losing weight again.      Constipation/irregular bowel movements/bloating  Patient reports history of ongoing constipation for over 20 years after having her first child.  She has tried multiple interventions including regular bowel cleanouts, MiraLAX, senna, milk of magnesium, Linzess, and Amitiza for which she was on when she first came to  GI clinic.  Note, patient has been on Creon in the past (currently on Relizorb digestive enzyme cartridge).    Amitiza had worked for awhile then lost effectiveness. She was trialed on Trulance but reported frequent loose stools in the initial days (including nocturnal stooling and incontinence) which prompted her to stop. Trial of rifaximin in the past with with improvement in bloating and stool consistency though, as stated above, a re-trial for her pain, n/v, and fullness was not successful.    Motegrity was then started with  "some success, though noted HAs. We discussed a possible switch in her regimen to address this, though she was most comfortable with continuing Motegrity and monitoring her symptoms.  Previously discussed combining daily constipation medications; noted patient reports cost for Amitiza was quite a lot  (running $1000+/month).     By 2022, she was on Motegrity daily as well as Miralax 1 capful BID, 4 senna a day, 1000 mg Mag citrate daily, and stool softeners. With this she would have no BMs for 3-4 days (though has gone anywhere from 1-8 days), then have a day of emptying where she has multiple BMs over a day. These were often \"enormous\" and she felt empty when complete. Noted her bowel pattern is complicated by her need for antiemetics. When she is \"backed up\" she will experience early satiety.       By late 2022, she felt BMs were improved with restart of TFs. She stopped Motegrity/prucalopride around 12/30/2022. She was taking only 2.5 mL of liquid senna BID (total 8.8 mg daily).  With this Ms. Loo, was moving her bowels 3-4 days out of a week. On the days she moved her bowels it was typically more than once (2-3 times). Stool consistency was soft, \"ribbon-like\" stools. She denied straining. When asked about HAs she did feel these were better - unclear if this is because she went off Motegrity or if due to treatment of migraines  (received Botox injections.)     In fall 2023, she's restarted Motegrity, Senna twice daily (5 mL) with Miralax prn. She continued on amitriptyline (discomfort). With this she had a BM 5 out of 7 days , nearly always in the morning. She may have a few more  BMs later in the day, though she's done by early afternoon. Stools were often formed, smooth, and \"sticky\", occasionally loose.  She continued on Relizorb.     By early 2024,  Ms. Loo has had increasing nausea, abdominal pain, and decreased stool output. Please see New[employer]stand messages and telephone encounters for further details. " Acute CT imaging did not reveal an SBO or other pathology. Home bowel regimen was not particularly successful. She ended up getting a soapsuds enema at her local ED with results and some improved symptoms. We pursued a therapeutic gastrograffin enema and aggressive bowel regimen to ensure colonic emptying to address worsening pain, discomfort, and nausea.  We then adjusted her bowel regimen with plan for periodic GGE and consideration of adding Movantik (to be discussed with pain team as well).       Today, she continues with pelvic floor PT session and feels like she is understanding the biofeedback a bit better. Sheis doing home exercises as well with a plan to check back in in a few weeks. She also notes some difference with bladder control too.      Current BMs are only loose. In the last week has had BMs on 5/7 days, about 4 of the 7 days had good amount of stool output. She has been doing enemas about every 2-3 days (just had two days without enemas, then did one today with very large output.)    GERD, Dysphagia   Summarized/taken from prior documentation  Patient experiences GERD as substernal and throat burning with nocturnal relaxant causing choking. She reports a prior Schatzki's ring requiring previous dilation. Manometry in 2015 was normal, and pH impedence at that time had a DeMeester of 24 with positive symptoms association. Patient has treated GERD symptoms with some combination of BID PPI, Carafate, H2 blocker, and Tums in the past. She'd had issues with access to liquid PPI due to insurance and liquid famotidine had not be sufficient for symptoms. Omeprazole by mouth seemed to never pass the stomach/get absorbed and would come out the G-tube when vented even when clamped for an hour or so (clamping longer led to more severe pain and symptoms). IV pantoprazole was ultimately started (issues digesting the pill).      Today, not discussed.         Pain:   In regards to pain she had followed initially  "in our pain clinic for management of bulging disk as well as chronic abdominal pain for which she previously had local injections. Ms. Loo shares that much of her abdominal pain is a lot better after BAUDILIO and her first SB resection.  She was off of all pain medications except Tylenol and methycarbomal (for back pain) until her surgery and hospitalization for management of her perforation. She's continued to have pain at both her G and J-tube site.      She follows with [facility] at this time. She is enrolled in spinal cord stimulator study and hoping thi helps with pain. She was told this may have some impact on her nausea as well.      Today, has a few sites that are bothering her:  G-tube site: sharp pain at site  Pelvis: intermittent pelvic pain described like \"labor pain\"  PICC line: hurts with dressing/tape change as well as injections  Chronic abd pain: continues on pain meds, baclofen which brings this pain down to a 3/10        PROBLEM LIST  Patient Active Problem List    Diagnosis Date Noted    Bacteremia 11/10/2023     Priority: Medium    Abdominal pain, unspecified abdominal location 11/09/2023     Priority: Medium    Cholangitis (H28) 06/07/2023     Priority: Medium    On total parenteral nutrition (TPN) 06/04/2023     Priority: Medium    Fever, unspecified fever cause 06/04/2023     Priority: Medium    Chronic pancreatitis, unspecified pancreatitis type (H) 06/04/2023     Priority: Medium    History of food intolerance 05/09/2023     Priority: Medium    Malnutrition, unspecified type (H24) 05/09/2023     Priority: Medium    Nausea and vomiting, unspecified vomiting type 05/09/2023     Priority: Medium    Major depression, single episode 03/06/2023     Priority: Medium    Acute postoperative abdominal pain 01/31/2023     Priority: Medium    Feeding intolerance 12/30/2022     Priority: Medium    Myofascial pain 10/14/2022     Priority: Medium     Added automatically from request for surgery " [id number]      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  pancreas and spleen removed, islet cells transplanted into liver    3 classes of vaccines needed .    1. Pneumococcal vaccines are as follows:       PCV 13 - one time dose (was given 2017)       PPSV23 - (given 12/1/16); repeat q 5 years      ROLE of PCV 20???    2. Meningococcal vaccines     Menactra - (last given 12/1/16) -  repeat q 5 yrs   NOTE: need to wait 4 wks after PCV 13 has been given.   OR - give Menveo instead as it can be given same time as PCV 13    AND   Bexcero - (got 12/9/16) - does not need to be repeated.     3. The Hib vaccine - (got 12/9/16) - does not need to be repeated.      Poisoning by drug 05/09/2022     Priority: Medium     Formatting of this note might be different from the original.  Per Care Everywhere review:  All oral, IV and injectable steroids are contraindicated (unless in life threatening situations) in [facility] [employer] transplant recipients. They can cause irreversible loss of islet cell function. Please contact patient's transplant care coordinator ADI Gaffney RN at [phone]/pager [phone] and/or endocrinologist prior to administration.      Type 1 diabetes mellitus without complication (H) 05/09/2022     Priority: Medium     Formatting of this note might be different from the original.  ACTUALLY - DM 3c - pathogenic diabetes as no pancreas.  (pancreas and spleen removed, islet cells transplanted into liver)    Seeing ENDO at [facility] - Dr Erum Melissa      Intra-abdominal abscess (H) 12/03/2021     Priority: Medium    Postprocedural intraabdominal abscess 12/03/2021     Priority: Medium    Gastrostomy tube obstruction (H) 11/27/2021     Priority: Medium    Abdominal pain, generalized 09/18/2021     Priority: Medium    Adult failure to thrive 08/16/2021     Priority: Medium     Added automatically from request for surgery [id number]       Hypoglycemia 08/05/2021     Priority: Medium    Iron deficiency anemia 03/22/2019     Priority: Medium    Headache, chronic migraine without aura 09/18/2018     Priority: Medium    Chronic pain syndrome 09/18/2018     Priority: Medium    S/P hernia repair 08/23/2018     Priority: Medium    Incisional hernia, without obstruction or gangrene 05/20/2018     Priority: Medium    Adhesive capsulitis of shoulder, unspecified laterality 11/14/2017     Priority: Medium    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  Excess IgG4  [facility]      Gastroparesis      Priority: Medium    ACP (advance care planning) 03/28/2017     Priority: Medium     Advance Care Planning 3/28/2017: Receipt of ACP document:  Received: Health Care Directive which was witnessed or notarized on 12/8/16.  Document previously scanned on 1/12/17.  Validation form completed and scanned.  Code Status reflects choices in most recent ACP document..  Confirmed/documented designated decision maker(s).  Added by May Baires   Advance Care Planning Liaison              Pancreatic insufficiency 01/17/2017     Priority: Medium    Diabetes insipidus (H24) 01/05/2017     Priority: Medium     Formatting of this note might be different from the original.  Diabetes Insipidus (DI)  Per external records.  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  Depression NOS      Other type of intractable  "migraine      Priority: Medium     Diagnosis updated by automated process. Provider to review and confirm.      GERD (gastroesophageal reflux disease) 12/01/2010     Priority: Medium    Lumbago 04/18/2005     Priority: Medium     History of L5-S1 degenerative disk disease.        Sensorineural hearing loss 01/10/2005     Priority: Medium     Problem list name updated by automated process. Provider to review      Vertiginous syndrome and labyrinthine disorder 01/10/2005     Priority: Medium     Problem list name updated by automated process. Provider to review  IMO Regulatory Load OCT 2020      Sprain and strain of other specified sites of hip and thigh 12/09/2002     Priority: Medium    Chondromalacia of patella 12/09/2002     Priority: Medium    Need for prophylactic immunotherapy      Priority: Medium     trees, grass, srw, dust mites, cat      Allergic rhinitis due to other allergen 12/21/2001     Priority: Medium    Hypothyroidism      Priority: Medium     Problem list name updated by automated process. Provider to review         PERTINENT MEDICATIONS:  Current Outpatient Medications   Medication    acetaminophen (TYLENOL) 325 MG/10.15ML solution    amitriptyline (ELAVIL) 10 MG tablet    baclofen (LIORESAL) 10 MG tablet    buprenorphine (SUBUTEX) 2 MG SUBL sublingual tablet    cetirizine (ZYRTEC) 10 MG tablet    COMPOUNDED NON-CONTROLLED SUBSTANCE (CMPD RX) - PHARMACY TO MIX COMPOUNDED MEDICATION    COMPRO 25 MG suppository    Digestive Enzyme Cartridge (RELIZORB) MARCO    diphenhydrAMINE (BENADRYL) 12.5 MG/5ML solution    droNABinol (SYNDROS) 5 MG/ML oral soln    estradiol (VAGIFEM) 10 MCG TABS vaginal tablet    famotidine (PEPCID) 40 MG/5ML suspension    glucagon 1 MG kit    glucose 40 % GEL gel    insulin aspart (NOVOLOG PEN) 100 UNIT/ML pen    Insulin Glargine-yfgn (SEMGLEE, YFGN,) 100 UNIT/ML SOPN    insulin syringe-needle U-100 (30G X 1/2\" 0.3 ML) 30G X 1/2\" 0.3 ML miscellaneous    lactated ringers infusion " "   levothyroxine (SYNTHROID/LEVOTHROID) 137 MCG tablet    lidocaine (LIDODERM) 5 % patch    lipase-protease-amylase (CREON) 22758-84530-039001 units CPEP per EC capsule    methocarbamol (ROBAXIN) 750 MG tablet    MOTEGRITY 2 MG tablet    Nutritional Supplements (BOOST HIGH PROTEIN) LIQD    pantoprazole (PROTONIX) 40 mg IV push injection    prochlorperazine (COMPAZINE) 10 MG tablet    promethazine 25 mg    rimegepant (NURTEC) 75 MG ODT tablet    scopolamine (TRANSDERM) 1 MG/3DAYS 72 hr patch    sennosides (SENOKOT) 8.8 MG/5ML syrup    Sharps Container (BD SHARPS ) MISC    silver nitrate (ARZOL SILVER NIT APPLICATORS) 75-25 % miscellaneous    sodium phosphate, mineral oil, magnesium citrate enema    STATIN NOT PRESCRIBED (INTENTIONAL)    zinc oxide - white petrolatum (CRITIC-AID THICK MOIST BARRIER) 20-51% PSTE topical paste    ZOLMitriptan (ZOMIG-ZMT) 5 MG ODT     Current Facility-Administered Medications   Medication    Botulinum Toxin Type A (BOTOX) 200 units injection 155 Units         PHYSICAL EXAMINATION:  Vitals /74   Pulse 94   Ht 1.727 m (5' 8\")   Wt 56.9 kg (125 lb 8 oz)   SpO2 96%   BMI 19.08 kg/m     Wt   Wt Readings from Last 2 Encounters:   03/13/24 56.9 kg (125 lb 8 oz)   03/06/24 56.2 kg (124 lb)      Gen: Pt sitting up in NAD, interactive and cooperative on exam  Eyes: sclerae anicteric, no injection  ENT:  MMM  Resp: Breathing comfortably on exam, speaking in full sentences  Skin: No jaundice  Neuro: alert, oriented, answers questions appropriately        PERTINENT STUDIES:    Office Visit on 09/07/2023   Component Date Value Ref Range Status    Hemoglobin A1C POCT 09/07/2023 6.3  4.3 - <5.7 % Final       "

## 2024-03-14 ENCOUNTER — PATIENT OUTREACH (OUTPATIENT)
Dept: GASTROENTEROLOGY | Facility: CLINIC | Age: 59
End: 2024-03-14
Payer: COMMERCIAL

## 2024-03-14 DIAGNOSIS — K31.84 GASTROPARESIS: Primary | ICD-10-CM

## 2024-03-14 NOTE — PROGRESS NOTES
Received a vm from Calli Shoen from [facility] [phone],  regarding increasing the amount of tube feeds from 11 hours a day to 16 hours a day.   Dipika stated that she will call Dinora to work on increasing the amount of time the tube feeds are running.

## 2024-03-14 NOTE — PROGRESS NOTES
"IR was consulted for either a \"rendon\" or port. Patient has history of a prior port that was removed due to infection with a PICC placed in the interim. Spoke with Ashley in GI over the phone and plan to schedule patient for a virtual IR clinic visit with a PA to discuss access options.      What is the reason for the IR order request?   Consult            Consult Reason?   currently has a PICC Line and is received IV fluids 4 days a week and IV meds daily. Needs a port or a hickamn. See GI note on 3-13.            Patients clinical information/history?   IV fluids and IV meds, chronic pancreatitis, nausea and vomiting and chronic constipation            Call Back #   Ashley GI RN Care Coordinator [phone]            Is the patient on aspirin, Plavix or blood thinners?   No          "

## 2024-03-14 NOTE — PROGRESS NOTES
Received a message from Dr. Genao with follow up items after the appointment on 3-13.     Gastrografin enema is scheduled on 3-25    IR consult order placed for a rendon or port     Left a message for  [facility] regarding increasing calories with tube feeds. Currently on vital 1.5 formula.  Looking to increase from 90 ml for 11 hours to 90 ml for 16 hours.     Follow up appointment scheduled on 6-5 at 8 am with Dr. Genao.         Discussed the above with Dinora

## 2024-03-15 ENCOUNTER — TELEPHONE (OUTPATIENT)
Dept: ONCOLOGY | Facility: CLINIC | Age: 59
End: 2024-03-15
Payer: COMMERCIAL

## 2024-03-15 NOTE — TELEPHONE ENCOUNTER
"Follow up phone call to Dinora this morning.  She had a number of appointments with her care team to try to address her ongoing GI concerns.      Told \"we still have boxes we haven't checked\" and told that there is still hope she could get into remission.     ABle to name that she is grieving, and feeling demoralized.  She continues to have chronic symptom burden which also makes coping feel difficult at times.     She plans to discuss the possibility of starting an anti depressant at her next appointment and I expressed support for this.      SHe is open to continuing to see me virtually in clinic and I will ask our scheduling team to follow up.    JAIDA Nguyen, [facility]   Palliative Care Clinical   Ph: [phone]    "

## 2024-03-19 DIAGNOSIS — E13.9 POST-PANCREATECTOMY DIABETES (H): Primary | ICD-10-CM

## 2024-03-19 DIAGNOSIS — E89.1 POST-PANCREATECTOMY DIABETES (H): Primary | ICD-10-CM

## 2024-03-19 DIAGNOSIS — Z90.410 POST-PANCREATECTOMY DIABETES (H): Primary | ICD-10-CM

## 2024-03-19 RX ORDER — PROCHLORPERAZINE 25 MG
SUPPOSITORY, RECTAL RECTAL
Qty: 10 SUPPOSITORY | Refills: 0 | OUTPATIENT
Start: 2024-03-19

## 2024-03-19 SDOH — HEALTH STABILITY: PHYSICAL HEALTH: ON AVERAGE, HOW MANY MINUTES DO YOU ENGAGE IN EXERCISE AT THIS LEVEL?: 40 MIN

## 2024-03-19 SDOH — HEALTH STABILITY: PHYSICAL HEALTH: ON AVERAGE, HOW MANY DAYS PER WEEK DO YOU ENGAGE IN MODERATE TO STRENUOUS EXERCISE (LIKE A BRISK WALK)?: 4 DAYS

## 2024-03-19 ASSESSMENT — PATIENT HEALTH QUESTIONNAIRE - PHQ9
SUM OF ALL RESPONSES TO PHQ QUESTIONS 1-9: 17
10. IF YOU CHECKED OFF ANY PROBLEMS, HOW DIFFICULT HAVE THESE PROBLEMS MADE IT FOR YOU TO DO YOUR WORK, TAKE CARE OF THINGS AT HOME, OR GET ALONG WITH OTHER PEOPLE: SOMEWHAT DIFFICULT
SUM OF ALL RESPONSES TO PHQ QUESTIONS 1-9: 17

## 2024-03-19 ASSESSMENT — SOCIAL DETERMINANTS OF HEALTH (SDOH): HOW OFTEN DO YOU GET TOGETHER WITH FRIENDS OR RELATIVES?: ONCE A WEEK

## 2024-03-19 NOTE — PROGRESS NOTES
INTERVENTIONAL RADIOLOGY CLINIC NOTE  03/19/24    Referring Provider:  Vijaya Genao MD, Ashley Mane RN coordinator    Patient Name:  Dinora Mcghee  Medical Record Number:  [id number]  YOB: 1965  Reason for Visit:  to discuss central venous catheter options      Impression: power tunneled central venous catheter.   Will need TPN.  Tryig to increase tube feeds/  Rate causing a lot of stomach.  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  ***PICC is uncomfortable    currently has a PICC Line and is received IV fluids 4 days a week and IV meds daily.  Needs a port or a hickamn. See GI note on 3-13.     Was in ER has to stick fiver times and unable to draw blood per [place].      Two medications LR 5 out of 7 days a week.  IV pheregan 3 times a day, protonix and IV compazine and benadryl.  Will need IV meds every day.  Twice have gone on TPN but has not tolerated it.   G for venting and J for nutrition.  Need extra TPN,.  If urine is concentration, and if weight is down.   Gives her more energy.      Albumin:    Latest Reference Range & Units 11/15/23 07:00   Albumin 3.5 - 5.2 g/dL 3.9       PMH: hypothyroidism, pituitary adenoma s/p resection, uterine tumor s/p hysterectomy, sphincter of Oddi dysfunction, depression, GERD, sensorineural hearing loss. Vertiginous syndrome and labyrinthine disorder.    Risks and benefits of procedure explained to patient which includes but is not limited to bleeding,  infection, catheter malposition,injury to adjacent nerves, vessels, organ systems, pneumothorax, air embolus, catheter associated thrombosis, all of which may require additional treatment or procedures.     Imaging Reviewed:  ***           Angela Man PA-C  Interventional Radiology  [phone] (IR control)  [phone] (pager)    =====    Past Medical History:  Past Medical History:   Diagnosis Date    Allergic rhinitis, cause unspecified     Allergy to other foods     strawberries, apples, celeries, alice, watermelon    Arthritis     left knee    Choledocholithiasis     long after cholecystectomy    Chronic abdominal pain     Chronic constipation     Chronic nausea     Chronic pancreatitis (H)     Degeneration of lumbar or lumbosacral intervertebral disc     Esophageal reflux     w/ hiatal hernia    Gastroparesis     Hiatal hernia     History of pituitary adenoma     s/p resection    Hypothyroidism     Migraines     Mild hyperlipidemia     On tube feeding diet     presence of GJ tube    Pancreatic disease     PD stricture, 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     PONV (postoperative nausea and vomiting)     Portacath in place     Type 1 diabetes mellitus without complication (H) 2022    Unspecified hearing loss     25% high frequency R       Past Surgical History:  Past Surgical History:   Procedure Laterality Date    ABDOMEN SURGERY  1999    c sections: 93, 96, 98. endometriosis growth    APPENDECTOMY       SECTION  1993    COLONOSCOPY  2014    COLONOSCOPY N/A 2023    Procedure: COLONOSCOPY, WITH POLYPECTOMY AND BIOPSY;  Surgeon: Percy Chamorro MD;  [facility]    ENDOSCOPIC INSERTION TUBE GASTROSTOMY N/A 2021    Procedure: Gastrojejonostomy placement with jejunopexy, PEG tube exchange;  Surgeon: Zackery Montoya MD;  [facility]    ENDOSCOPIC RETROGRADE CHOLANGIOPANCREATOGRAM N/A 2015    Procedure: ENDOSCOPIC RETROGRADE  CHOLANGIOPANCREATOGRAM;  Surgeon: Mandeep Park MD;  [facility]    ENDOSCOPIC RETROGRADE CHOLANGIOPANCREATOGRAM N/A 02/09/2016    Procedure: COMBINED ENDOSCOPIC RETROGRADE CHOLANGIOPANCREATOGRAPHY, PLACE TUBE/STENT;  Surgeon: Mandeep Park MD;  [facility]    ENDOSCOPIC RETROGRADE CHOLANGIOPANCREATOGRAM N/A 03/17/2016    Procedure: COMBINED ENDOSCOPIC RETROGRADE CHOLANGIOPANCREATOGRAPHY, REMOVE FOREIGN BODY OR STENT/TUBE;  Surgeon: Mandeep Park MD;  [facility]    ENDOSCOPIC RETROGRADE CHOLANGIOPANCREATOGRAM N/A 08/02/2016    Procedure: COMBINED ENDOSCOPIC RETROGRADE CHOLANGIOPANCREATOGRAPHY, PLACE TUBE/STENT;  Surgeon: Mandeep Park MD;  [facility]    ENDOSCOPIC RETROGRADE CHOLANGIOPANCREATOGRAM N/A 08/26/2016    Procedure: COMBINED ENDOSCOPIC RETROGRADE CHOLANGIOPANCREATOGRAPHY, REMOVE FOREIGN BODY OR STENT/TUBE;  Surgeon: Mandeep Park MD;  Location:  OR    ENDOSCOPIC ULTRASOUND UPPER GASTROINTESTINAL TRACT (GI) N/A 10/03/2016    Procedure: ENDOSCOPIC ULTRASOUND, ESOPHAGOSCOPY / UPPER GASTROINTESTINAL TRACT (GI);  Surgeon: Guru Jose Rodas MD;  Location:  OR    ENT SURGERY  1989    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    ENTEROSCOPY SMALL BOWEL N/A 02/03/2022    Procedure: ENTEROSCOPY with possible fistula closure;  Surgeon: Francisco Dodson MD;  Location:  GI    ESOPHAGOSCOPY, GASTROSCOPY, DUODENOSCOPY (EGD), COMBINED N/A 06/24/2015    Procedure: COMBINED ESOPHAGOSCOPY, GASTROSCOPY, DUODENOSCOPY (EGD), REMOVE FOREIGN BODY;  Surgeon: Mandeep Park MD;  Location:  GI    ESOPHAGOSCOPY, GASTROSCOPY, DUODENOSCOPY (EGD), COMBINED N/A 10/25/2015    Procedure: COMBINED ESOPHAGOSCOPY, GASTROSCOPY, DUODENOSCOPY (EGD);  Surgeon: Sammy Amaro MD;  Location:  GI    ESOPHAGOSCOPY, GASTROSCOPY, DUODENOSCOPY (EGD), COMBINED N/A 10/25/2015    Procedure: COMBINED ESOPHAGOSCOPY, GASTROSCOPY, DUODENOSCOPY (EGD), BIOPSY  SINGLE OR MULTIPLE;  Surgeon: Sammy Amaro MD;  [facility]    ESOPHAGOSCOPY, GASTROSCOPY, DUODENOSCOPY (EGD), COMBINED N/A 01/31/2023    Procedure: ESOPHAGOGASTRODUODENOSCOPY (EGD) with peg replacement ;  Surgeon: Mandeep Park MD;  [facility]    ESOPHAGOSCOPY, GASTROSCOPY, DUODENOSCOPY (EGD), DILATATION, COMBINED      EXCISE LESION TRUNK N/A 04/17/2017    Procedure: EXCISE LESION TRUNK;  Removal of Abdominal Foreign Body;  Surgeon: Nestor Phoenix MD;  [facility]    HC ESOPH/GAS REFLUX TEST W NASAL IMPED >1 HR N/A 11/19/2015    Procedure: ESOPHAGEAL IMPEDENCE FUNCTION TEST WITH 24 HOUR PH GREATER THAN 1 HOUR;  Surgeon: Thiago Apple MD;  [facility]    HC UGI ENDOSCOPY DIAG W BIOPSY  09/17/2008    HC UGI ENDOSCOPY DIAG W BIOPSY  09/27/2012    HC UGI ENDOSCOPY W ESOPHAGEAL DILATION BALLOON <30MM  09/17/2008    HC UGI ENDOSCOPY W EUS N/A 05/05/2015    Procedure: COMBINED ENDOSCOPIC ULTRASOUND, ESOPHAGOSCOPY, GASTROSCOPY, DUODENOSCOPY (EGD);  Surgeon: Wm Dueñas MD;  [facility]    HC WRIST ARTHROSCOP,RELEASE XVERS LIG Bilateral 12/17/2008    INJECT TRANSVERSUS ABDOMINIS PLANE (TAP) BLOCK BILATERAL Left 09/22/2016    Procedure: INJECT TRANSVERSUS ABDOMINIS PLANE (TAP) BLOCK BILATERAL;  Surgeon: Dickson Corrigan MD;  [facility]    INJECT TRIGGER POINT Bilateral 09/08/2022    Procedure: Abdominal trigger point injection with ultrasound;  Surgeon: Monika Mahajan MD;  [facility]    INJECT TRIGGER POINT SINGLE / MULTIPLE 3 OR MORE MUSCLES Right 11/15/2022    Procedure: Trigger point injections right abdomen with ultrasound guidance;  Surgeon: Monika Mahajan MD;  [facility]    IR CHEST PORT PLACEMENT < 5 YRS OF AGE  06/10/2022    IR PORT REMOVAL RIGHT  11/09/2023    laparoscopic pineda  01/01/1995    LAPAROSCOPIC HERNIORRHAPHY INCISIONAL N/A 08/23/2018    Procedure: LAPAROSCOPIC HERNIORRHAPHY INCISIONAL;  Laparoscopic Incisional Hernia Repair with  Symbotex Mesh Implant;  Surgeon: Nestor Phoenix MD;  [facility]    LAPAROSCOPIC PANCREATECTOMY, TRANSPLANT AUTO ISLET CELL N/A 12/28/2016    Procedure: LAPAROSCOPIC PANCREATECTOMY, TRANSPLANT AUTO ISLET CELL;  Surgeon: Nestor Phoenix MD;  [facility]    LAPAROTOMY EXPLORATORY N/A 01/31/2023    Procedure: Exploratory Laparotomy, lysis of adhesions, Perforated J-Junostomy Resection, Replace J-Junostomy site;  Surgeon: Elver Bauer MD;  [facility]    LAPAROTOMY, LYSIS ADHESIONS, COMBINED N/A 01/31/2023    Procedure: Dilatation of jejunostomy feeding tube, track with placement of jejunostomy tube with fluoroscopy;  Surgeon: Elver Bauer MD;  [facility]    PICC DOUBLE LUMEN PLACEMENT Left 11/13/2023    Basilic Vein 5F DL 43 cm    REMOVE AND REPLACE BREAST IMPLANT PROSTHESIS N/A 12/30/2022    Procedure: Exploratory Laparotomy, lysis of adhesions, small bowel resection. Placement of gastric jejunostomy for enteral feeding.;  Surgeon: Elver Bauer MD;  [facility]    REMOVE GASTROSTOMY TUBE ADULT N/A 01/28/2022    Procedure: REMOVAL, GASTROSTOMY TUBE, ADULT;  Surgeon: Mandeep Park MD;  [facility]    REPLACE GASTROJEJUNOSTOMY TUBE, PERCUTANEOUS N/A 09/07/2021    Procedure: GASTROJEJUNOSTOMY TUBE PLACEMENT, PERCUTANEOUS, WITH GASTROPEXY;  Surgeon: Mandeep Park MD;  [facility]    REPLACE GASTROJEJUNOSTOMY TUBE, PERCUTANEOUS N/A 09/22/2021    Procedure: REPLACEMENT, GASTROJEJUNOSTOMY TUBE, PERCUTANEOUS;  Surgeon: Zackery Montoya MD;  [facility]    REPLACE GASTROJEJUNOSTOMY TUBE, PERCUTANEOUS N/A 11/22/2022    Procedure: REPLACEMENT, GASTROJEJUNOSTOMY TUBE, PERCUTANEOUS;  Surgeon: Mandeep Park MD;  [facility]    REPLACE GASTROJEJUNOSTOMY TUBE, PERCUTANEOUS N/A 12/09/2022    Procedure: REPLACEMENT, GASTROJEJUNOSTOMY TUBE, PERCUTANEOUS with ENDOSCOPIC SUTURING.;  Surgeon: Mandeep Park MD;  [facility]     REPLACE GASTROJEJUNOSTOMY TUBE, PERCUTANEOUS N/A 2023    Procedure: Replace Gastrojejunostomy Tube, Percutaneous;  Surgeon: Mandeep Park MD;  [facility]    REPLACE GASTROJEJUNOSTOMY TUBE, PERCUTANEOUS N/A 2023    Procedure: Replace Gastrojejunostomy Tube, Percutaneous;  Surgeon: Francisco Dodson MD;  [facility]    REPLACE GASTROJEJUNOSTOMY TUBE, PERCUTANEOUS N/A 2023    Procedure: Replace Gastrojejunostomy Tube, Percutaneous;  Surgeon: Mandeep Park MD;  [facility]    REPLACE JEJUNOSTOMY TUBE, PERCUTANEOUS N/A 09/10/2021    Procedure: UPPER ENDOSCOPY, REPLACEMENT OF PERCUTANEOUS GASTROJEJUNOSTOMY TUBE, T-TAG GASTROPEXY;  Surgeon: Zackery Montoya MD;  [facility]    REPLACE JEJUNOSTOMY TUBE, PERCUTANEOUS N/A 2021    Procedure: REPLACEMENT, JEJUNOSTOMY TUBE, PERCUTANEOUS;  Surgeon: Mandeep Park MD;  [facility]    REPLACE JEJUNOSTOMY TUBE, PERCUTANEOUS N/A 2023    Procedure: REPLACEMENT, JEJUNOSTOMY TUBE, PERCUTANEOUS;  Surgeon: Mandeep Park MD;  [facility]    REPLACE JEJUNOSTOMY TUBE, PERCUTANEOUS  2023    Procedure: Replace Jejunostomy Tube, Percutaneous;  Surgeon: Mandeep Park MD;  [facility]    transphenoidal pituitary resection  1990    [facility]  DELIVERY ONLY  1996    [facility]  DELIVERY ONLY  1998    repeat c section with incidental cystotomy with repair    [facility] EXCIS PITUITARY,TRANSNASAL/SEPTAL  1980    pituitary tumor removed for diabetes insipidus    [facility] TOTAL ABDOM HYSTERECTOMY  1999    w/ bilateral salpingoophorectomy        Problem List:  Patient Active Problem List    Diagnosis Date Noted    Bacteremia 11/10/2023     Priority: Medium    Abdominal pain, unspecified abdominal location 2023     Priority: Medium    Cholangitis (H28) 2023     Priority: Medium    On total parenteral nutrition (TPN) 2023     Priority: Medium    Fever,  unspecified fever cause 06/04/2023     Priority: Medium    Chronic pancreatitis, unspecified pancreatitis type (H) 06/04/2023     Priority: Medium    History of food intolerance 05/09/2023     Priority: Medium    Malnutrition, unspecified type (H24) 05/09/2023     Priority: Medium    Nausea and vomiting, unspecified vomiting type 05/09/2023     Priority: Medium    Major depression, single episode 03/06/2023     Priority: Medium    Acute postoperative abdominal pain 01/31/2023     Priority: Medium    Feeding intolerance 12/30/2022     Priority: Medium    Myofascial pain 10/14/2022     Priority: Medium     Added automatically from request for surgery [id number]      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  pancreas and spleen removed, islet cells transplanted into liver    3 classes of vaccines needed .    1. Pneumococcal vaccines are as follows:       PCV 13 - one time dose (was given 2017)       PPSV23 - (given 12/1/16); repeat q 5 years      ROLE of PCV 20???    2. Meningococcal vaccines     Menactra - (last given 12/1/16) -  repeat q 5 yrs   NOTE: need to wait 4 wks after PCV 13 has been given.   OR - give Menveo instead as it can be given same time as PCV 13    AND   Bexcero - (got 12/9/16) - does not need to be repeated.     3. The Hib vaccine - (got 12/9/16) - does not need to be repeated.      Poisoning by drug 05/09/2022     Priority: Medium     Formatting of this note might be different from the original.  Per Care Everywhere review:  All oral, IV and injectable steroids are contraindicated (unless in life threatening situations) in Islet Auto transplant recipients. They can cause irreversible loss of islet cell function. Please contact patient's transplant care coordinator ADI Gaffney RN at [phone]/pager [phone] and/or endocrinologist prior to administration.      Type 1 diabetes mellitus without complication (H)  05/09/2022     Priority: Medium     Formatting of this note might be different from the original.  ACTUALLY - DM 3c - pathogenic diabetes as no pancreas.  (pancreas and spleen removed, islet cells transplanted into liver)    Seeing ENDO at [facility] - Dr Erum Melissa      Intra-abdominal abscess (H) 12/03/2021     Priority: Medium    Postprocedural intraabdominal abscess 12/03/2021     Priority: Medium    Gastrostomy tube obstruction (H) 11/27/2021     Priority: Medium    Abdominal pain, generalized 09/18/2021     Priority: Medium    Adult failure to thrive 08/16/2021     Priority: Medium     Added automatically from request for surgery [id number]      Hypoglycemia 08/05/2021     Priority: Medium    Iron deficiency anemia 03/22/2019     Priority: Medium    Headache, chronic migraine without aura 09/18/2018     Priority: Medium    Chronic pain syndrome 09/18/2018     Priority: Medium    S/P hernia repair 08/23/2018     Priority: Medium    Incisional hernia, without obstruction or gangrene 05/20/2018     Priority: Medium    Adhesive capsulitis of shoulder, unspecified laterality 11/14/2017     Priority: Medium    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  Excess IgG4  [facility]      Gastroparesis      Priority: Medium    ACP (advance care planning) 03/28/2017     Priority: Medium     Advance Care Planning 3/28/2017: Receipt of ACP document:  Received: Health Care Directive which was witnessed or notarized on 12/8/16.  Document previously scanned on 1/12/17.  Validation form completed and scanned.  Code Status reflects choices in most recent ACP document..  Confirmed/documented designated decision maker(s).  Added by May Baires   Advance Care Planning Liaison              Pancreatic insufficiency 01/17/2017     Priority: Medium     Diabetes insipidus (H24) 01/05/2017     Priority: Medium     Formatting of this note might be different from the original.  Diabetes Insipidus (DI)  Per external records.  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  Depression NOS      Other type of intractable migraine      Priority: Medium     Diagnosis updated by automated process. Provider to review and confirm.      GERD (gastroesophageal reflux disease) 12/01/2010     Priority: Medium    Lumbago 04/18/2005     Priority: Medium     History of L5-S1 degenerative disk disease.        Sensorineural hearing loss 01/10/2005     Priority: Medium     Problem list name updated by automated process. Provider to review      Vertiginous syndrome and labyrinthine disorder 01/10/2005     Priority: Medium     Problem list name updated by automated process. Provider to review  IMO Regulatory Load OCT 2020      Sprain and strain of other specified sites of hip and thigh 12/09/2002     Priority: Medium    Chondromalacia of patella 12/09/2002     Priority: Medium    Need for prophylactic immunotherapy      Priority: Medium     trees, grass, srw, dust mites, cat      Allergic rhinitis due to other allergen 12/21/2001     Priority: Medium    Hypothyroidism      Priority: Medium     Problem list name updated by automated process. Provider to review         Medications:  Prescription Medications as of 3/19/2024         Rx Number Disp Refills Start End Last Dispensed Date Next Fill Date Owning Pharmacy    acetaminophen (TYLENOL) 325 MG/10.15ML solution  473 mL 4 2/15/2023 --   [id number] - [facility] [address] AT [facility] [address]    Si.3 mLs (650 mg) by Per J Tube route every 6 hours as needed for mild pain or fever    Class: E-Prescribe    Route: Per J Tube    amitriptyline (ELAVIL) 10 MG tablet  120 tablet 11 2023 --   [facility] [address]    Sig: Take 4 tablets (40 mg) by mouth At Bedtime    Class: E-Prescribe    Notes to Pharmacy: The patient requests that this prescription be held on file for filling in the near future.    Route: Oral    baclofen (LIORESAL) 10 MG tablet  60 tablet 1 11/15/2023 --   [facility] [address]    Si-2 tablets (10-20 mg) by Per G Tube route 3 times daily    Class: E-Prescribe    Route: Per G Tube    Renewals       Renewal requests to authorizing provider (Ammy Bolden MD) <b>prohibited</b>            buprenorphine (SUBUTEX) 2 MG SUBL sublingual tablet  -- -- 2024 --       Sig: Place 2 mg under the tongue 2 times daily    Class: Historical    Route: Sublingual    cetirizine (ZYRTEC) 10 MG tablet  30 tablet 11 2023 --   [facility] [address]    Si tablet (10 mg) by Per G Tube route daily    Class: E-Prescribe    Route: Per G Tube    COMPOUNDED NON-CONTROLLED SUBSTANCE (CMPD RX) - PHARMACY TO MIX COMPOUNDED MEDICATION  60 each 3 2024 --   [facility] [address]    Sig: Place 1 enema rectally 2 times daily as needed (for constipation)    Class: E-Prescribe    Route: Rectal    COMPRO 25 MG suppository  10 suppository 0 2024 --   [facility] [address]    Sig: INSERT 1 SUPPOSITORY RECTALLY EVERY 12 HOURS AS NEEDED FOR NAUSEA    Class: E-Prescribe    Digestive Enzyme Cartridge (RELIZORB) MARCO  60 each 6 2023 --       Si Cartridges daily    Class: No Print Out     "Route: As instructed    diphenhydrAMINE (BENADRYL) 12.5 MG/5ML solution  -- --  --       Sig: Take 25 mg by mouth 4 times daily as needed for other (nausea)    Class: Historical    Route: Oral    droNABinol (SYNDROS) 5 MG/ML oral soln  60 mL 0 2024 --   [facility] [place] [address]    Sig: Take 0.5 mLs (2.5 mg) by mouth 2 times daily    Class: E-Prescribe    Earliest Fill Date: 2024    Notes to Pharmacy: The patient requests that this prescription be held on file for filling in the near future.    Route: Oral    estradiol (VAGIFEM) 10 MCG TABS vaginal tablet  24 tablet 2 3/6/2023 --   [facility] [place] [address] [address]     Sig: INSERT 1 TABLET(10 MCG) VAGINALLY 2 TIMES A WEEK    Class: E-Prescribe    Notes to Pharmacy: The patient requests that this prescription be held on file for filling in the near future.    famotidine (PEPCID) 40 MG/5ML suspension  50 mL 4 3/11/2024 --   [facility] [address]     Si.5 mLs (20 mg) by Per J Tube route daily    Class: E-Prescribe    Route: Per J Tube    glucagon 1 MG kit  10 each 3 3/3/2023 --   [facility] [address] [address]     Sig: Give 0.1 to 0.15mg( 10-15 units on Insulin sryinge) subcutaneous  every 15 minutes PRN for hypoglycemia. Remix new kit q24hr. Needs up to 3 kit/week.    Class: E-Prescribe    glucose 40 % GEL gel  3 Tube 2 2017 --   [facility] [place], [place] - [address]    Sig: Take 15-30 g by mouth every 15 minutes as needed for low blood sugar    Class: E-Prescribe    Route: Oral    insulin aspart (NOVOLOG PEN) 100 UNIT/ML pen  15 mL 11 2023 --   [id number] - [place], [place] - [address] AT [facility] [address]    Sig: Take 1 unit if BG is 175-275, and 2 units if >275 up to every 4 hours as needed.  \"Prime\" pen needle with 2 " "unit airshot before giving, needs supply for 15 unit/day.    Class: E-Prescribe    Insulin Glargine-yfgn (SEMGLEE, YFGN,) 100 UNIT/ML SOPN  15 mL 3 2023 --   [facility] [place] [address] [address]    Sig: Inject 3 Units Subcutaneous every 24 hours    Class: E-Prescribe    Route: Subcutaneous    insulin syringe-needle U-100 (30G X 1/2\" 0.3 ML) 30G X 1/2\" 0.3 ML miscellaneous  100 each 11 2023 --   [facility] [place] [address] [address]    Sig: Use 3 syringes daily or as directed.    Class: E-Prescribe    lactated ringers infusion  -- -- 11/15/2023 --       Sig: Inject 1,000 mLs into the vein daily as needed    Class: Historical    Route: Intravenous    levothyroxine (SYNTHROID/LEVOTHROID) 137 MCG tablet  90 tablet 3 2023 --   [facility] [address]    Si tablet (137 mcg) by Per G Tube route daily    Class: E-Prescribe    Notes to Pharmacy: The patient requests that this prescription be held on file for filling in the near future.    Route: Per G Tube    lidocaine (LIDODERM) 5 % patch  -- --  --       Sig: Place onto the skin every 24 hours To prevent lidocaine toxicity, patient should be patch free for 12 hrs daily.    Class: Historical    Route: Transdermal    lipase-protease-amylase (CREON) [id number] units CPEP per EC capsule  150 capsule 11 2023 --   [facility] [address]    Sig: Take 3-4 capsules by mouth 3 times daily (with meals) With oral meals    Class: E-Prescribe    Route: Oral    methocarbamol (ROBAXIN) 750 MG tablet  120 tablet 11 2023 --   [facility] [address] [address]    Sig: Take 1 tablet (750 mg) by mouth 4 times daily as needed for muscle spasms    Class: E-Prescribe    Route: Oral    MOTEGRITY 2 MG tablet  30 tablet 6 2024 --   [facility] - [address]    Sig: TAKE ONE TABLET BY MOUTH ONCE DAILY    Class: E-Prescribe    Route: Oral    No prior authorization was found for this prescription.    Found prior authorization for another prescription for the same medication: Approved    Nutritional Supplements (BOOST HIGH PROTEIN) LIQD  -- 0 1/19/2022 --       Sig: After above baseline labs are drawn, give: 6 mL/kg to maximum of 360 mL; the beverage is to be consumed within 5 minutes.    Class: No Print Out    Notes to Pharmacy: If on pancreatic enzymes, patient may take home enzymes with Boost beverage.      Patients may take long acting insulin (Levemir and Lantus).      Patient should NOT cover Boost with Novolog, Humalog, Apidra, or regular insulin.    pantoprazole (PROTONIX) 40 mg IV push injection  30 each 11 5/13/2023 --       Sig: Inject 40 mg into the vein daily (with breakfast)    Class: Local Print    Notes to Pharmacy: Patient gets it from her home infusion    Route: Intravenous    prochlorperazine (COMPAZINE) 10 MG tablet  120 tablet 3 3/8/2024 --   [email or web address] - [address]    Sig: Take 1 tablet (10 mg) by mouth every 6 hours as needed for nausea or vomiting    Class: E-Prescribe    Route: Oral    promethazine 25 mg  -- -- 11/15/2023 --       Sig: Inject 25 mg into the vein 3 times daily    Class: Historical    Route: Intravenous    rimegepant (NURTEC) 75 MG ODT tablet  8 tablet 11 12/5/2023 --   [facility] #[id number] [facility] [address]  AT [facility] OF [address]     Sig: Place 1 tablet (75 mg) under the tongue daily as needed for migraine Maximum of 1 tablet every 24 hours.    Class: E-Prescribe    Route: Sublingual    scopolamine (TRANSDERM) 1 MG/3DAYS 72 hr patch  10 patch 11 2/27/2024 --   [facility] #[id number] [facility] [address]  AT [facility] OF [address]    Sig: Place 1 patch onto the skin every 72 hours - Transdermal     "Class: E-Prescribe    sennosides (SENOKOT) 8.8 MG/5ML syrup  236 mL 11 2023 --   [facility] #[id number] - [facility] [address] AT [facility] OF [address]    Si mLs by Per J Tube route 2 times daily    Class: E-Prescribe    Route: Per J Tube    Sharps Container (BD SHARPS ) MISC  1 each 1 2017 --   [address]    Si Container as needed    Class: E-Prescribe    Route: Does not apply    silver nitrate (ARZOL SILVER NIT APPLICATORS) 75-25 % miscellaneous  30 applicator 0 2023 --   [facility] [place], [place] - [address] [place] [address]    Sig: Apply topically as needed for wound care Apply Silver Nitrate Sticks every 2-3 days until granulation tissue resolved.  \"Roll\" tip of Silver Nitrate Q tip directly onto the granulation tissue-bulging tissue area.  Have Agency or Home Care Nurse administer this, if you prefer.  Take care not to touch any healthy tissue or skin.  The tissue will turn white and eventually black & scab off.  May repeat if needed in the future for recurrence.    Class: E-Prescribe    Route: Topical    sodium phosphate, mineral oil, magnesium citrate enema  -- -- 2024 --       Sig: Instill 1 pink 187 ml enema twice daily as needed for constipation    Class: No Print Out    STATIN NOT PRESCRIBED (INTENTIONAL)  -- --  --       Sig: Previous liver issues, risks vs benefits felt to not warrant statin.    Discussed Oct 2022 visit    Class: Historical    Route: Does not apply    zinc oxide - white petrolatum (CRITIC-AID THICK MOIST BARRIER) 20-51% PSTE topical paste  170 g 0 2023 --   [facility]    Sig: Apply 71 g topically every hour as needed for rash (Under J tube bumper when needed for skin protection)    Class: E-Prescribe    Route: Topical    ZOLMitriptan (ZOMIG-ZMT) 5 MG ODT  12 tablet 6 10/24/2023 --   [id number] - [place] [id number] AT [address]    Sig: Take 1 tablet (5 mg) by mouth at onset of headache for migraine Dissolve tablet in the mouth. May repeat in 2 hours. Max 2 tablets/24 hours.    Class: E-Prescribe    Route: Oral          Clinic-Administered Medications as of 3/19/2024         Dose Frequency Start End    Botulinum Toxin Type A (BOTOX) 200 units injection 155 Units 155 Units SEE ADMIN INSTRUCTIONS 6/6/2023 --    Admin Instructions: [email or web address]    Route: Intramuscular            Allergies:  Allergies   Allergen Reactions    Apple Juice Anaphylaxis    Corticosteroids Other (See Comments)     All oral, IV and injectable steroids are contraindicated (unless in life threatening situations) in [employer] transplant recipients. They can cause irreversible loss of islet cell function. Please contact patient's transplant care coordinator ADI Gaffney RN at [phone]/pager [phone] and/or endocrinologist prior to administration.      Depakote [Divalproex Sodium] Other (See Comments)     Chest pain    Zithromax [Azithromycin Dihydrate] Anaphylaxis     Noted in 4/7/08 ER    Bromfenac      Other reaction(s): Headache    Codeine      Other reaction(s): Hallucinations    Darvocet [Propoxyphene N-Apap] Itching    Morphine Nausea and Vomiting and Rash    Nalbuphine Hcl Rash     RASH :nubaine    Zosyn [Piperacillin-Tazobactam In Dex] Rash     Possible allergy, did have a diffuse rash that seemed drug related but could have also been related to soap in the hospital.     Bactrim [Sulfamethoxazole W-Trimethoprim] Other (See Comments) and Nausea and Vomiting     Severely low liver function.    Statins Other (See Comments)     Previous liver issues, risks vs benefits felt to not warrant statin.  Discussed Oct 2022 visit    Tape [Adhesive Tape] Blisters    Tramadol     Zofran [Ondansetron] Other (See Comments)     migraine    Flagyl [Metronidazole] Hives and Rash       Results  Reviewed:  Lab Results   Component Value Date     11/10/2023     12/17/2020     Lab Results   Component Value Date    INR 1.15 11/15/2023    INR 1.05 01/16/2018       Vital Signs:  There were no vitals taken for this visit.  General:  alert and oriented times 3 in NAD  Chest:  ***non labored breathing on ***room air.  ***scars    =====    Phone call Duration:  15 minutes  Chart Review which includes medical history, imaging, labs, medications, allergies, telephone visit and documentation:  45 minutes      ***Documentation - note with requirements for billing - most of our visits will be 20911 (30 minutes - low medical decision making) or 89141 (45 minutes - moderate medical decision making) - there is a CPT chart in our [employer] Teams Page under Documents > Coding and Billing > [email or web address]     CC:  1) Referring Provider  Juan Jose Gomez MD  [address] [place] [id number]    2) Primary Care Provider  Juan Jose Gomez MD  [address] [place] [id number]

## 2024-03-20 ENCOUNTER — MEDICAL CORRESPONDENCE (OUTPATIENT)
Dept: HEALTH INFORMATION MANAGEMENT | Facility: CLINIC | Age: 59
End: 2024-03-20

## 2024-03-20 ENCOUNTER — LAB (OUTPATIENT)
Dept: LAB | Facility: CLINIC | Age: 59
End: 2024-03-20
Payer: COMMERCIAL

## 2024-03-20 ENCOUNTER — OFFICE VISIT (OUTPATIENT)
Dept: INTERNAL MEDICINE | Facility: CLINIC | Age: 59
End: 2024-03-20
Payer: COMMERCIAL

## 2024-03-20 ENCOUNTER — VIRTUAL VISIT (OUTPATIENT)
Dept: VASCULAR SURGERY | Facility: CLINIC | Age: 59
End: 2024-03-20
Payer: COMMERCIAL

## 2024-03-20 VITALS
BODY MASS INDEX: 19.73 KG/M2 | DIASTOLIC BLOOD PRESSURE: 70 MMHG | OXYGEN SATURATION: 99 % | HEART RATE: 96 BPM | WEIGHT: 125.7 LBS | HEIGHT: 67 IN | SYSTOLIC BLOOD PRESSURE: 105 MMHG

## 2024-03-20 VITALS — BODY MASS INDEX: 18.64 KG/M2 | HEIGHT: 68 IN | WEIGHT: 123 LBS

## 2024-03-20 DIAGNOSIS — E10.9 TYPE 1 DIABETES MELLITUS WITHOUT COMPLICATION (H): ICD-10-CM

## 2024-03-20 DIAGNOSIS — R62.7 ADULT FAILURE TO THRIVE: ICD-10-CM

## 2024-03-20 DIAGNOSIS — R11.2 NAUSEA AND VOMITING, UNSPECIFIED VOMITING TYPE: ICD-10-CM

## 2024-03-20 DIAGNOSIS — Z01.818 PREOP GENERAL PHYSICAL EXAM: Primary | ICD-10-CM

## 2024-03-20 DIAGNOSIS — K59.04 CHRONIC IDIOPATHIC CONSTIPATION: ICD-10-CM

## 2024-03-20 DIAGNOSIS — K31.84 GASTROPARESIS: Primary | ICD-10-CM

## 2024-03-20 DIAGNOSIS — F43.21 SITUATIONAL DEPRESSION: ICD-10-CM

## 2024-03-20 DIAGNOSIS — R10.84 ABDOMINAL PAIN, GENERALIZED: ICD-10-CM

## 2024-03-20 DIAGNOSIS — N95.2 ATROPHIC VAGINITIS: ICD-10-CM

## 2024-03-20 DIAGNOSIS — E46 MALNUTRITION, UNSPECIFIED TYPE (H): ICD-10-CM

## 2024-03-20 DIAGNOSIS — G89.4 CHRONIC PAIN SYNDROME: ICD-10-CM

## 2024-03-20 DIAGNOSIS — Z12.31 VISIT FOR SCREENING MAMMOGRAM: ICD-10-CM

## 2024-03-20 DIAGNOSIS — Z87.19 HISTORY OF FOOD INTOLERANCE: ICD-10-CM

## 2024-03-20 DIAGNOSIS — K31.84 GASTROPARESIS: ICD-10-CM

## 2024-03-20 DIAGNOSIS — Z00.00 ROUTINE GENERAL MEDICAL EXAMINATION AT A HEALTH CARE FACILITY: ICD-10-CM

## 2024-03-20 LAB
ALBUMIN SERPL BCG-MCNC: 4.5 G/DL (ref 3.5–5.2)
ALP SERPL-CCNC: 101 U/L (ref 40–150)
ALT SERPL W P-5'-P-CCNC: 14 U/L (ref 0–50)
ANION GAP SERPL CALCULATED.3IONS-SCNC: 11 MMOL/L (ref 7–15)
AST SERPL W P-5'-P-CCNC: 24 U/L (ref 0–45)
BILIRUB SERPL-MCNC: 0.3 MG/DL
BUN SERPL-MCNC: 16.5 MG/DL (ref 6–20)
CALCIUM SERPL-MCNC: 9.4 MG/DL (ref 8.6–10)
CHLORIDE SERPL-SCNC: 101 MMOL/L (ref 98–107)
CHOLEST SERPL-MCNC: 164 MG/DL
CREAT SERPL-MCNC: 0.57 MG/DL (ref 0.51–0.95)
DEPRECATED HCO3 PLAS-SCNC: 28 MMOL/L (ref 22–29)
EGFRCR SERPLBLD CKD-EPI 2021: >90 ML/MIN/1.73M2
ERYTHROCYTE [DISTWIDTH] IN BLOOD BY AUTOMATED COUNT: 13.3 % (ref 10–15)
FASTING STATUS PATIENT QL REPORTED: NO
GLUCOSE SERPL-MCNC: 99 MG/DL (ref 70–99)
HBA1C MFR BLD: 5.6 %
HCT VFR BLD AUTO: 40.7 % (ref 35–47)
HDLC SERPL-MCNC: 50 MG/DL
HGB BLD-MCNC: 13.2 G/DL (ref 11.7–15.7)
LDLC SERPL CALC-MCNC: 94 MG/DL
MCH RBC QN AUTO: 30.3 PG (ref 26.5–33)
MCHC RBC AUTO-ENTMCNC: 32.4 G/DL (ref 31.5–36.5)
MCV RBC AUTO: 93 FL (ref 78–100)
NONHDLC SERPL-MCNC: 114 MG/DL
PLATELET # BLD AUTO: 377 10E3/UL (ref 150–450)
POTASSIUM SERPL-SCNC: 4.4 MMOL/L (ref 3.4–5.3)
PROT SERPL-MCNC: 7.5 G/DL (ref 6.4–8.3)
RBC # BLD AUTO: 4.36 10E6/UL (ref 3.8–5.2)
SODIUM SERPL-SCNC: 140 MMOL/L (ref 135–145)
TRIGL SERPL-MCNC: 101 MG/DL
WBC # BLD AUTO: 6 10E3/UL (ref 4–11)

## 2024-03-20 PROCEDURE — 85027 COMPLETE CBC AUTOMATED: CPT | Performed by: PATHOLOGY

## 2024-03-20 PROCEDURE — 99207 PR FOOT EXAM NO CHARGE: CPT | Performed by: INTERNAL MEDICINE

## 2024-03-20 PROCEDURE — 83036 HEMOGLOBIN GLYCOSYLATED A1C: CPT | Performed by: INTERNAL MEDICINE

## 2024-03-20 PROCEDURE — 82043 UR ALBUMIN QUANTITATIVE: CPT | Performed by: INTERNAL MEDICINE

## 2024-03-20 PROCEDURE — 99396 PREV VISIT EST AGE 40-64: CPT | Performed by: INTERNAL MEDICINE

## 2024-03-20 PROCEDURE — 99203 OFFICE O/P NEW LOW 30 MIN: CPT | Mod: 95 | Performed by: PHYSICIAN ASSISTANT

## 2024-03-20 PROCEDURE — 99000 SPECIMEN HANDLING OFFICE-LAB: CPT | Performed by: PATHOLOGY

## 2024-03-20 PROCEDURE — 99214 OFFICE O/P EST MOD 30 MIN: CPT | Mod: 25 | Performed by: INTERNAL MEDICINE

## 2024-03-20 PROCEDURE — 36415 COLL VENOUS BLD VENIPUNCTURE: CPT | Performed by: PATHOLOGY

## 2024-03-20 PROCEDURE — 80061 LIPID PANEL: CPT | Performed by: PATHOLOGY

## 2024-03-20 PROCEDURE — 80053 COMPREHEN METABOLIC PANEL: CPT | Performed by: PATHOLOGY

## 2024-03-20 RX ORDER — AMITRIPTYLINE HYDROCHLORIDE 10 MG/1
40 TABLET ORAL AT BEDTIME
Qty: 120 TABLET | Refills: 11 | Status: SHIPPED | OUTPATIENT
Start: 2024-03-20 | End: 2024-05-09

## 2024-03-20 RX ORDER — ESTRADIOL 10 UG/1
INSERT VAGINAL
Qty: 24 TABLET | Refills: 2 | Status: SHIPPED | OUTPATIENT
Start: 2024-03-20

## 2024-03-20 RX ORDER — METHOCARBAMOL 750 MG/1
750 TABLET, FILM COATED ORAL 4 TIMES DAILY PRN
Qty: 120 TABLET | Refills: 11 | Status: SHIPPED | OUTPATIENT
Start: 2024-03-20

## 2024-03-20 RX ORDER — DULOXETIN HYDROCHLORIDE 30 MG/1
30 CAPSULE, DELAYED RELEASE ORAL DAILY
Qty: 30 CAPSULE | Refills: 1 | Status: SHIPPED | OUTPATIENT
Start: 2024-03-20 | End: 2024-04-19 | Stop reason: ALTCHOICE

## 2024-03-20 ASSESSMENT — PAIN SCALES - GENERAL: PAINLEVEL: SEVERE PAIN (7)

## 2024-03-20 NOTE — PROGRESS NOTES
Preoperative Evaluation  [facility] INTERNAL MEDICINE [address]  4TH FLOOR  [facility] [address]  Phone: [phone]  Fax: [phone]  Primary Provider: Krystle Gomez  Pre-op Performing Provider: KRYSTLE GOMEZ  Mar 20, 2024       Dinora is a 58 year old, presenting for the following:  Physical  And Pre-op        3/20/2024     2:48 PM   Additional Questions   Roomed by YW     Surgical Information  Surgery/Procedure: tunneled double lumen central venous catheter placement, (CT power injectable) with moderate sedation   Surgery Location: TBD  Surgery Date: TBD  Fax number for surgical facility: Note does not need to be faxed, will be available electronically in Epic.    Assessment & Plan     The proposed surgical procedure is considered LOW risk.    Preop general physical exam  and  Adult failure to thrive  and  Malnutrition, unspecified type (H24)  and  History of food intolerance    Plan to replace PICC with tunneled line.  Dronabinol refilled.  Continues on tube feeding.      - droNABinol (SYNDROS) 5 MG/ML oral soln; Take 0.5 mLs (2.5 mg) by mouth 2 times daily    Nausea and vomiting, unspecified vomiting type    Med refilled    - amitriptyline (ELAVIL) 10 MG tablet; Take 4 tablets (40 mg) by mouth at bedtime    Atrophic vaginitis    Med refilled    - estradiol (VAGIFEM) 10 MCG TABS vaginal tablet; INSERT 1 TABLET(10 MCG) VAGINALLY 2 TIMES A WEEK    Chronic pain syndrome    Following with pain clinic, Med refilled    - methocarbamol (ROBAXIN) 750 MG tablet; Take 1 tablet (750 mg) by mouth 4 times daily as needed for muscle spasms    Type 1 diabetes mellitus without complication (H)    Following with endo.  Glargine just changed to determir per insurance.  Normal foot exam today.  She will schedule eye exam.  Labs as below.  A1C today is well controlled at 5.6.     - FOOT EXAM  - Lipid panel reflex to direct LDL Non-fasting; Future  - Albumin Random Urine Quantitative  with Creat Ratio; Future  - HEMOGLOBIN A1C; Future    Visit for screening mammogram    - MA Screen Bilateral w/Ankit; Future    Situational depression    Dinora continues to struggle with depression related to her poor health.  She has been seeing therapy, but GI recently recommended she try duloxetine to help with mood and possibly pain.  She is on amitriptyline, so we discussed possible increased serotonin with the combo and she will watch for symptoms.  Depression action plan sent to [facility]. We'll start the duloxetine at a lower dose and follow-up in 6 weeks.     - DULoxetine (CYMBALTA) 30 MG capsule; Take 1 capsule (30 mg) by mouth daily    Routine general medical examination at a health care facility    Pap smear: s/p hysterectomy  Immunizations: UTD  Lab Studies: as above  Mammogram: ordered  Colonoscopy/FIT: planned for this year  Advanced care planning: on file, no changes         Antiplatelet or Anticoagulation Medication Instructions   - Patient is on no antiplatelet or anticoagulation medications.    Additional Medication Instructions    Insulin: Dinora thinks her tube feeds may continued andrez-operatively since they bypass the stomach.  She will discuss with the surgery team.  If TF continued, continue insulin.  If TF held, would stop insulin entirely as she has had andrez-op hypoglycemia in the past and is only on low dose insulin    Buprenorphine: advised her to discuss post-procedure pain control plan with her pain clinic    Recommendation  APPROVAL GIVEN to proceed with proposed procedure, without further diagnostic evaluation.      Subjective     HPI related to upcoming procedure:  Dinora met with vascular this morning and they plan to replaced her PICC with a tunneled line.     She is otherwise struggling with depression recently due to chronic health issues.  See other note for more details.          3/20/2024     3:11 PM   Preop Questions   1. Have you ever had a heart attack or stroke? No   2. Have  you ever had surgery on your heart or blood vessels, such as a stent placement, a coronary artery bypass, or surgery on an artery in your head, neck, heart, or legs? YES - port placement   3. Do you have chest pain with activity? No   4. Do you have a history of  heart failure? No   5. Do you currently have a cold, bronchitis or symptoms of other infection? No   6. Do you have a cough, shortness of breath, or wheezing? No   7. Do you or anyone in your family have previous history of blood clots? No   8. Do you or does anyone in your family have a serious bleeding problem such as prolonged bleeding following surgeries or cuts? No   9. Have you ever had problems with anemia or been told to take iron pills? YES - after surgery had a couple of iron infusion, latest Hgb on 2/15 was normal   10. Have you had any abnormal blood loss such as black, tarry or bloody stools, or abnormal vaginal bleeding? No   11. Have you ever had a blood transfusion? No   12. Are you willing to have a blood transfusion if it is medically needed before, during, or after your surgery? Yes   13. Have you or any of your relatives ever had problems with anesthesia? No   14. Do you have sleep apnea, excessive snoring or daytime drowsiness? No   15. Do you have any artifical heart valves or other implanted medical devices like a pacemaker, defibrillator, or continuous glucose monitor? YES - CGM only   15a. What type of device do you have? cgm freestyle jim 3   15b. Name of the clinic that manages your device:  [facility]   16. Do you have artificial joints? No   17. Are you allergic to latex? No   18. Is there any chance that you may be pregnant? No     Health Care Directive  Patient has a Health Care Directive on file      Preoperative Review of    reviewed - controlled substances reflected in medication list.      Patient Active Problem List    Diagnosis Date Noted    Bacteremia 11/10/2023     Priority: Medium    Abdominal  pain, unspecified abdominal location 11/09/2023     Priority: Medium    Cholangitis (H28) 06/07/2023     Priority: Medium    On total parenteral nutrition (TPN) 06/04/2023     Priority: Medium    Fever, unspecified fever cause 06/04/2023     Priority: Medium    Chronic pancreatitis, unspecified pancreatitis type (H) 06/04/2023     Priority: Medium    History of food intolerance 05/09/2023     Priority: Medium    Malnutrition, unspecified type (H24) 05/09/2023     Priority: Medium    Nausea and vomiting, unspecified vomiting type 05/09/2023     Priority: Medium    Major depression, single episode 03/06/2023     Priority: Medium    Acute postoperative abdominal pain 01/31/2023     Priority: Medium    Feeding intolerance 12/30/2022     Priority: Medium    Myofascial pain 10/14/2022     Priority: Medium     Added automatically from request for surgery [id number]      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  pancreas and spleen removed, islet cells transplanted into liver    3 classes of vaccines needed .    1. Pneumococcal vaccines are as follows:       PCV 13 - one time dose (was given 2017)       PPSV23 - (given 12/1/16); repeat q 5 years      ROLE of PCV 20???    2. Meningococcal vaccines     Menactra - (last given 12/1/16) -  repeat q 5 yrs   NOTE: need to wait 4 wks after PCV 13 has been given.   OR - give Menveo instead as it can be given same time as PCV 13    AND   Bexcero - (got 12/9/16) - does not need to be repeated.     3. The Hib vaccine - (got 12/9/16) - does not need to be repeated.      Poisoning by drug 05/09/2022     Priority: Medium     Formatting of this note might be different from the original.  Per Care Everywhere review:  All oral, IV and injectable steroids are contraindicated (unless in life threatening situations) in [facility]let [employer] transplant recipients. They can cause irreversible loss of islet cell function. Please  contact patient's transplant care coordinator ADI Gaffney RN at [phone]/pager [phone] and/or endocrinologist prior to administration.      Type 1 diabetes mellitus without complication (H) 05/09/2022     Priority: Medium     Formatting of this note might be different from the original.  ACTUALLY - DM 3c - pathogenic diabetes as no pancreas.  (pancreas and spleen removed, islet cells transplanted into liver)    Seeing ENDO at [facility] - Dr Erum Melissa      Intra-abdominal abscess (H) 12/03/2021     Priority: Medium    Postprocedural intraabdominal abscess 12/03/2021     Priority: Medium    Gastrostomy tube obstruction (H) 11/27/2021     Priority: Medium    Abdominal pain, generalized 09/18/2021     Priority: Medium    Adult failure to thrive 08/16/2021     Priority: Medium     Added automatically from request for surgery [id number]      Hypoglycemia 08/05/2021     Priority: Medium    Iron deficiency anemia 03/22/2019     Priority: Medium    Headache, chronic migraine without aura 09/18/2018     Priority: Medium    Chronic pain syndrome 09/18/2018     Priority: Medium    S/P hernia repair 08/23/2018     Priority: Medium    Incisional hernia, without obstruction or gangrene 05/20/2018     Priority: Medium    Adhesive capsulitis of shoulder, unspecified laterality 11/14/2017     Priority: Medium    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  Excess IgG4  [facility]      Gastroparesis      Priority: Medium    ACP (advance care planning) 03/28/2017     Priority: Medium     Advance Care Planning 3/28/2017: Receipt of ACP document:  Received: Health Care Directive which was witnessed or notarized on 12/8/16.  Document previously scanned on 1/12/17.  Validation form completed and scanned.  Code Status reflects choices in most recent ACP  document..  Confirmed/documented designated decision maker(s).  Added by May Baires   Advance Care Planning Liaison              Pancreatic insufficiency 01/17/2017     Priority: Medium    Diabetes insipidus (H24) 01/05/2017     Priority: Medium     Formatting of this note might be different from the original.  Diabetes Insipidus (DI)  Per external records.  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  Depression NOS      Other type of intractable migraine      Priority: Medium     Diagnosis updated by automated process. Provider to review and confirm.      GERD (gastroesophageal reflux disease) 12/01/2010     Priority: Medium    Lumbago 04/18/2005     Priority: Medium     History of L5-S1 degenerative disk disease.        Sensorineural hearing loss 01/10/2005     Priority: Medium     Problem list name updated by automated process. Provider to review      Vertiginous syndrome and labyrinthine disorder 01/10/2005     Priority: Medium     Problem list name updated by automated process. Provider to review  IMO Regulatory Load OCT 2020      Sprain and strain of other specified sites of hip and thigh 12/09/2002     Priority: Medium    Chondromalacia of patella 12/09/2002     Priority: Medium    Need for prophylactic immunotherapy      Priority: Medium     trees, grass, srw, dust mites, cat      Allergic rhinitis due to other allergen 12/21/2001     Priority: Medium    Hypothyroidism      Priority: Medium     Problem list name updated by automated process. Provider to review        Past Medical History:   Diagnosis Date     Allergic rhinitis, cause unspecified     Allergy to other foods     strawberries, apples, celeries, alice, watermelon    Arthritis     left knee    Choledocholithiasis     long after cholecystectomy    Chronic abdominal pain     Chronic constipation     Chronic nausea     Chronic pancreatitis (H)     Degeneration of lumbar or lumbosacral intervertebral disc     Esophageal reflux     w/ hiatal hernia    Gastroparesis     Hiatal hernia     History of pituitary adenoma     s/p resection    Hypothyroidism     Migraines     Mild hyperlipidemia     On tube feeding diet     presence of GJ tube    Pancreatic disease     PD stricture, 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 endometriosis growth    APPENDECTOMY       SECTION  1993    COLONOSCOPY  2014    COLONOSCOPY N/A 2023    Procedure: COLONOSCOPY, WITH POLYPECTOMY AND BIOPSY;  Surgeon: Percy Chamorro MD;  [facility]    ENDOSCOPIC INSERTION TUBE GASTROSTOMY N/A 2021    Procedure: Gastrojejonostomy placement with jejunopexy, PEG tube exchange;  Surgeon: Zackery Montoya MD;  [facility]    ENDOSCOPIC RETROGRADE CHOLANGIOPANCREATOGRAM N/A 2015    Procedure: ENDOSCOPIC RETROGRADE CHOLANGIOPANCREATOGRAM;  Surgeon: Mandeep Park MD;  [facility]    ENDOSCOPIC RETROGRADE CHOLANGIOPANCREATOGRAM N/A 2016    Procedure: COMBINED ENDOSCOPIC RETROGRADE CHOLANGIOPANCREATOGRAPHY, PLACE TUBE/STENT;  Surgeon: Mandeep Park MD;  [facility]    ENDOSCOPIC RETROGRADE CHOLANGIOPANCREATOGRAM N/A 2016    Procedure: COMBINED ENDOSCOPIC RETROGRADE CHOLANGIOPANCREATOGRAPHY, REMOVE FOREIGN BODY OR  STENT/TUBE;  Surgeon: Mandeep Park MD;  [facility]    ENDOSCOPIC RETROGRADE CHOLANGIOPANCREATOGRAM N/A 08/02/2016    Procedure: COMBINED ENDOSCOPIC RETROGRADE CHOLANGIOPANCREATOGRAPHY, PLACE TUBE/STENT;  Surgeon: Mandeep Park MD;  [facility]    ENDOSCOPIC RETROGRADE CHOLANGIOPANCREATOGRAM N/A 08/26/2016    Procedure: COMBINED ENDOSCOPIC RETROGRADE CHOLANGIOPANCREATOGRAPHY, REMOVE FOREIGN BODY OR STENT/TUBE;  Surgeon: Mandeep Park MD;  [facility]    ENDOSCOPIC ULTRASOUND UPPER GASTROINTESTINAL TRACT (GI) N/A 10/03/2016    Procedure: ENDOSCOPIC ULTRASOUND, ESOPHAGOSCOPY / UPPER GASTROINTESTINAL TRACT (GI);  Surgeon: Guru Jose Rodas MD;  Location:  OR    ENT SURGERY  1989    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    ENTEROSCOPY SMALL BOWEL N/A 02/03/2022    Procedure: ENTEROSCOPY with possible fistula closure;  Surgeon: Francisco Dodson MD;  Location:  GI    ESOPHAGOSCOPY, GASTROSCOPY, DUODENOSCOPY (EGD), COMBINED N/A 06/24/2015    Procedure: COMBINED ESOPHAGOSCOPY, GASTROSCOPY, DUODENOSCOPY (EGD), REMOVE FOREIGN BODY;  Surgeon: Mandeep Park MD;  Location:  GI    ESOPHAGOSCOPY, GASTROSCOPY, DUODENOSCOPY (EGD), COMBINED N/A 10/25/2015    Procedure: COMBINED ESOPHAGOSCOPY, GASTROSCOPY, DUODENOSCOPY (EGD);  Surgeon: Sammy Amaro MD;  Location:  GI    ESOPHAGOSCOPY, GASTROSCOPY, DUODENOSCOPY (EGD), COMBINED N/A 10/25/2015    Procedure: COMBINED ESOPHAGOSCOPY, GASTROSCOPY, DUODENOSCOPY (EGD), BIOPSY SINGLE OR MULTIPLE;  Surgeon: Sammy Amaro MD;  Location:  GI    ESOPHAGOSCOPY, GASTROSCOPY, DUODENOSCOPY (EGD), COMBINED N/A 01/31/2023    Procedure: ESOPHAGOGASTRODUODENOSCOPY (EGD) with peg replacement ;  Surgeon: Mandeep Park MD;  [facility]    ESOPHAGOSCOPY, GASTROSCOPY, DUODENOSCOPY (EGD), DILATATION, COMBINED      EXCISE LESION TRUNK N/A 04/17/2017    Procedure: EXCISE LESION TRUNK;  Removal of Abdominal  Foreign Body;  Surgeon: Nestor Phoenix MD;  [facility]    HC ESOPH/GAS REFLUX TEST W NASAL IMPED >1 HR N/A 11/19/2015    Procedure: ESOPHAGEAL IMPEDENCE FUNCTION TEST WITH 24 HOUR PH GREATER THAN 1 HOUR;  Surgeon: Thiago Apple MD;  [facility]    HC UGI ENDOSCOPY DIAG W BIOPSY  09/17/2008    HC UGI ENDOSCOPY DIAG W BIOPSY  09/27/2012    HC UGI ENDOSCOPY W ESOPHAGEAL DILATION BALLOON <30MM  09/17/2008    HC UGI ENDOSCOPY W EUS N/A 05/05/2015    Procedure: COMBINED ENDOSCOPIC ULTRASOUND, ESOPHAGOSCOPY, GASTROSCOPY, DUODENOSCOPY (EGD);  Surgeon: Wm Dueñas MD;  [facility]    HC WRIST ARTHROSCOP,RELEASE XVERS LIG Bilateral 12/17/2008    INJECT TRANSVERSUS ABDOMINIS PLANE (TAP) BLOCK BILATERAL Left 09/22/2016    Procedure: INJECT TRANSVERSUS ABDOMINIS PLANE (TAP) BLOCK BILATERAL;  Surgeon: Dickson Corrigan MD;  [facility]    INJECT TRIGGER POINT Bilateral 09/08/2022    Procedure: Abdominal trigger point injection with ultrasound;  Surgeon: Monika Mahajan MD;  [facility]    INJECT TRIGGER POINT SINGLE / MULTIPLE 3 OR MORE MUSCLES Right 11/15/2022    Procedure: Trigger point injections right abdomen with ultrasound guidance;  Surgeon: Monika Mahajan MD;  [facility]    IR CHEST PORT PLACEMENT < 5 YRS OF AGE  06/10/2022    IR PORT REMOVAL RIGHT  11/09/2023    laparoscopic pineda  01/01/1995    LAPAROSCOPIC HERNIORRHAPHY INCISIONAL N/A 08/23/2018    Procedure: LAPAROSCOPIC HERNIORRHAPHY INCISIONAL;  Laparoscopic Incisional Hernia Repair with Symbotex Mesh Implant;  Surgeon: Nestor Phoenix MD;  [facility]    LAPAROSCOPIC PANCREATECTOMY, TRANSPLANT AUTO ISLET CELL N/A 12/28/2016    Procedure: LAPAROSCOPIC PANCREATECTOMY, TRANSPLANT AUTO ISLET CELL;  Surgeon: Nestor Phoenix MD;  [facility]    LAPAROTOMY EXPLORATORY N/A 01/31/2023    Procedure: Exploratory Laparotomy, lysis of adhesions, Perforated J-Junostomy Resection, Replace J-Junostomy site;  Surgeon: Lizette  Elver Echevarria MD;  [facility]    LAPAROTOMY, LYSIS ADHESIONS, COMBINED N/A 01/31/2023    Procedure: Dilatation of jejunostomy feeding tube, track with placement of jejunostomy tube with fluoroscopy;  Surgeon: Elver Bauer MD;  [facility]    PICC DOUBLE LUMEN PLACEMENT Left 11/13/2023    Basilic Vein 5F DL 43 cm    REMOVE AND REPLACE BREAST IMPLANT PROSTHESIS N/A 12/30/2022    Procedure: Exploratory Laparotomy, lysis of adhesions, small bowel resection. Placement of gastric jejunostomy for enteral feeding.;  Surgeon: Elver Bauer MD;  [facility]    REMOVE GASTROSTOMY TUBE ADULT N/A 01/28/2022    Procedure: REMOVAL, GASTROSTOMY TUBE, ADULT;  Surgeon: Mandeep Park MD;  [facility]    REPLACE GASTROJEJUNOSTOMY TUBE, PERCUTANEOUS N/A 09/07/2021    Procedure: GASTROJEJUNOSTOMY TUBE PLACEMENT, PERCUTANEOUS, WITH GASTROPEXY;  Surgeon: Mandeep Park MD;  [facility]    REPLACE GASTROJEJUNOSTOMY TUBE, PERCUTANEOUS N/A 09/22/2021    Procedure: REPLACEMENT, GASTROJEJUNOSTOMY TUBE, PERCUTANEOUS;  Surgeon: Zackery Montoya MD;  [facility]    REPLACE GASTROJEJUNOSTOMY TUBE, PERCUTANEOUS N/A 11/22/2022    Procedure: REPLACEMENT, GASTROJEJUNOSTOMY TUBE, PERCUTANEOUS;  Surgeon: Mandeep Park MD;  [facility]    REPLACE GASTROJEJUNOSTOMY TUBE, PERCUTANEOUS N/A 12/09/2022    Procedure: REPLACEMENT, GASTROJEJUNOSTOMY TUBE, PERCUTANEOUS with ENDOSCOPIC SUTURING.;  Surgeon: Mandeep Park MD;  [facility]    REPLACE GASTROJEJUNOSTOMY TUBE, PERCUTANEOUS N/A 08/01/2023    Procedure: Replace Gastrojejunostomy Tube, Percutaneous;  Surgeon: Mandeep Park MD;  [facility]    REPLACE GASTROJEJUNOSTOMY TUBE, PERCUTANEOUS N/A 11/11/2023    Procedure: Replace Gastrojejunostomy Tube, Percutaneous;  Surgeon: Francisco Dodson MD;  [facility]    REPLACE GASTROJEJUNOSTOMY TUBE, PERCUTANEOUS N/A 12/8/2023    Procedure: Replace  Gastrojejunostomy Tube, Percutaneous;  Surgeon: Mandeep Park MD;  [facility]    REPLACE JEJUNOSTOMY TUBE, PERCUTANEOUS N/A 09/10/2021    Procedure: UPPER ENDOSCOPY, REPLACEMENT OF PERCUTANEOUS GASTROJEJUNOSTOMY TUBE, T-TAG GASTROPEXY;  Surgeon: Zackery Montoya MD;  [facility]    REPLACE JEJUNOSTOMY TUBE, PERCUTANEOUS N/A 2021    Procedure: REPLACEMENT, JEJUNOSTOMY TUBE, PERCUTANEOUS;  Surgeon: Mandeep Park MD;  [facility]    REPLACE JEJUNOSTOMY TUBE, PERCUTANEOUS N/A 2023    Procedure: REPLACEMENT, JEJUNOSTOMY TUBE, PERCUTANEOUS;  Surgeon: Mandeep Park MD;  [facility]    REPLACE JEJUNOSTOMY TUBE, PERCUTANEOUS  2023    Procedure: Replace Jejunostomy Tube, Percutaneous;  Surgeon: Mandeep Park MD;  [facility]    transphenoidal pituitary resection  1990    [facility] [place]  DELIVERY ONLY  1996    [facility] [place]  DELIVERY ONLY  1998    repeat c section with incidental cystotomy with repair    [facility] [place] EXCIS PITUITARY,TRANSNASAL/SEPTAL  1980    pituitary tumor removed for diabetes insipidus    [facility] [place] TOTAL ABDOM HYSTERECTOMY  1999    w/ bilateral salpingoophorectomy      Current Outpatient Medications   Medication Sig Dispense Refill    acetaminophen (TYLENOL) 325 MG/10.15ML solution 20.3 mLs (650 mg) by Per J Tube route every 6 hours as needed for mild pain or fever 473 mL 4    amitriptyline (ELAVIL) 10 MG tablet Take 4 tablets (40 mg) by mouth At Bedtime 120 tablet 11    baclofen (LIORESAL) 10 MG tablet 1-2 tablets (10-20 mg) by Per G Tube route 3 times daily 60 tablet 1    buprenorphine (SUBUTEX) 2 MG SUBL sublingual tablet Place 2 mg under the tongue 2 times daily      cetirizine (ZYRTEC) 10 MG tablet 1 tablet (10 mg) by Per G Tube route daily 30 tablet 11    COMPOUNDED NON-CONTROLLED SUBSTANCE (CMPD RX) - PHARMACY TO MIX COMPOUNDED MEDICATION Place 1 enema rectally 2 times daily as needed (for constipation) 60 each  "3    COMPRO 25 MG suppository INSERT 1 SUPPOSITORY RECTALLY EVERY 12 HOURS AS NEEDED FOR NAUSEA 10 suppository 0    Digestive Enzyme Cartridge (RELIZORB) MARCO 2 Cartridges daily 60 each 6    diphenhydrAMINE (BENADRYL) 12.5 MG/5ML solution Take 25 mg by mouth 4 times daily as needed for other (nausea)      droNABinol (SYNDROS) 5 MG/ML oral soln Take 0.5 mLs (2.5 mg) by mouth 2 times daily 60 mL 0    estradiol (VAGIFEM) 10 MCG TABS vaginal tablet INSERT 1 TABLET(10 MCG) VAGINALLY 2 TIMES A WEEK (Patient taking differently: INSERT 1 TABLET(10 MCG) VAGINALLY every third day) 24 tablet 2    famotidine (PEPCID) 40 MG/5ML suspension 2.5 mLs (20 mg) by Per J Tube route daily 50 mL 4    glucagon 1 MG kit Give 0.1 to 0.15mg( 10-15 units on Insulin sryinge) subcutaneous  every 15 minutes PRN for hypoglycemia. Remix new kit q24hr. Needs up to 3 kit/week. 10 each 3    glucose 40 % GEL gel Take 15-30 g by mouth every 15 minutes as needed for low blood sugar 3 Tube 2    insulin aspart (NOVOLOG PEN) 100 UNIT/ML pen Take 1 unit if BG is 175-275, and 2 units if >275 up to every 4 hours as needed.  \"Prime\" pen needle with 2 unit airshot before giving, needs supply for 15 unit/day. 15 mL 11    insulin detemir (LEVEMIR PEN) 100 UNIT/ML pen Inject 5 Units Subcutaneous at bedtime 15 mL 5    insulin syringe-needle U-100 (30G X 1/2\" 0.3 ML) 30G X 1/2\" 0.3 ML miscellaneous Use 3 syringes daily or as directed. 100 each 11    lactated ringers infusion Inject 1,000 mLs into the vein daily as needed      levothyroxine (SYNTHROID/LEVOTHROID) 137 MCG tablet 1 tablet (137 mcg) by Per G Tube route daily 90 tablet 3    lidocaine (LIDODERM) 5 % patch Place onto the skin every 24 hours To prevent lidocaine toxicity, patient should be patch free for 12 hrs daily.      lipase-protease-amylase (CREON) 52606-60928-535277 units CPEP per EC capsule Take 3-4 capsules by mouth 3 times daily (with meals) With oral meals 150 capsule 11    methocarbamol (ROBAXIN) " "750 MG tablet Take 1 tablet (750 mg) by mouth 4 times daily as needed for muscle spasms 120 tablet 11    MOTEGRITY 2 MG tablet TAKE ONE TABLET BY MOUTH ONCE DAILY 30 tablet 6    Nutritional Supplements (BOOST HIGH PROTEIN) LIQD After above baseline labs are drawn, give: 6 mL/kg to maximum of 360 mL; the beverage is to be consumed within 5 minutes.  0    pantoprazole (PROTONIX) 40 mg IV push injection Inject 40 mg into the vein daily (with breakfast) 30 each 11    prochlorperazine (COMPAZINE) 10 MG tablet Take 1 tablet (10 mg) by mouth every 6 hours as needed for nausea or vomiting 120 tablet 3    promethazine 25 mg Inject 25 mg into the vein 3 times daily      rimegepant (NURTEC) 75 MG ODT tablet Place 1 tablet (75 mg) under the tongue daily as needed for migraine Maximum of 1 tablet every 24 hours. 8 tablet 11    scopolamine (TRANSDERM) 1 MG/3DAYS 72 hr patch Place 1 patch onto the skin every 72 hours - Transdermal 10 patch 11    sennosides (SENOKOT) 8.8 MG/5ML syrup 5 mLs by Per J Tube route 2 times daily 236 mL 11    Sharps Container (BD SHARPS ) MISC 1 Container as needed 1 each 1    silver nitrate (ARZOL SILVER NIT APPLICATORS) 75-25 % miscellaneous Apply topically as needed for wound care Apply Silver Nitrate Sticks every 2-3 days until granulation tissue resolved.  \"Roll\" tip of Silver Nitrate Q tip directly onto the granulation tissue-bulging tissue area.  Have Agency or Home Care Nurse administer this, if you prefer.  Take care not to touch any healthy tissue or skin.  The tissue will turn white and eventually black & scab off.  May repeat if needed in the future for recurrence. 30 applicator 0    sodium phosphate, mineral oil, magnesium citrate enema Instill 1 pink 187 ml enema twice daily as needed for constipation      STATIN NOT PRESCRIBED (INTENTIONAL) Previous liver issues, risks vs benefits felt to not warrant statin.    Discussed Oct 2022 visit      zinc oxide - white petrolatum (CRITIC-AID " THICK MOIST BARRIER) 20-51% PSTE topical paste Apply 71 g topically every hour as needed for rash (Under J tube bumper when needed for skin protection) 170 g 0    ZOLMitriptan (ZOMIG-ZMT) 5 MG ODT Take 1 tablet (5 mg) by mouth at onset of headache for migraine Dissolve tablet in the mouth. May repeat in 2 hours. Max 2 tablets/24 hours. 12 tablet 6       Allergies   Allergen Reactions    Apple Juice Anaphylaxis    Corticosteroids Other (See Comments)     All oral, IV and injectable steroids are contraindicated (unless in life threatening situations) in [facility] transplant recipients. They can cause irreversible loss of islet cell function. Please contact patient's transplant care coordinator ADI Gaffney RN at [phone]/pager [phone] and/or endocrinologist prior to administration.      Depakote [Divalproex Sodium] Other (See Comments)     Chest pain    Zithromax [Azithromycin Dihydrate] Anaphylaxis     Noted in 4/7/08 ER    Bromfenac      Other reaction(s): Headache    Codeine      Other reaction(s): Hallucinations    Darvocet [Propoxyphene N-Apap] Itching    Morphine Nausea and Vomiting and Rash    Nalbuphine Hcl Rash     RASH :nubaine    Zosyn [Piperacillin-Tazobactam In Dex] Rash     Possible allergy, did have a diffuse rash that seemed drug related but could have also been related to soap in the hospital.     Bactrim [Sulfamethoxazole W-Trimethoprim] Other (See Comments) and Nausea and Vomiting     Severely low liver function.    Statins Other (See Comments)     Previous liver issues, risks vs benefits felt to not warrant statin.  Discussed Oct 2022 visit    Tape [Adhesive Tape] Blisters    Tramadol     Zofran [Ondansetron] Other (See Comments)     migraine    Flagyl [Metronidazole] Hives and Rash        Social History     Tobacco Use    Smoking status: Former     Packs/day: 0.50     Years: 6.00     Additional pack years: 0.00     Total pack years: 3.00     Types: Cigarettes     Start date: 9/1/1985  "    Quit date: 1992     Years since quittin.2    Smokeless tobacco: Not on file    Tobacco comments:     no 2nd hand   Substance Use Topics    Alcohol use: Not Currently     Alcohol/week: 3.0 - 6.0 standard drinks of alcohol     Types: 1 - 2 Glasses of wine, 1 - 2 Cans of beer, 1 - 2 Shots of liquor per week     Comment: none since I[facility]       History   Drug Use No     Comment: medical canabis tincture and vape         Review of Systems  10 point ROS of systems including Constitutional, Eyes, Respiratory, Cardiovascular, Gastroenterology, Genitourinary, Integumentary, Muscularskeletal, Psychiatric were all negative except for pertinent positives noted in my HPI plus chronic gastric issues.     Objective    /70 (BP Location: Right arm, Patient Position: Sitting, Cuff Size: Adult Regular)   Pulse 96   Ht 1.706 m (5' 7.17\")   Wt 57 kg (125 lb 11.2 oz)   SpO2 99%   BMI 19.59 kg/m     Estimated body mass index is 19.59 kg/m  as calculated from the following:    Height as of this encounter: 1.706 m (5' 7.17\").    Weight as of this encounter: 57 kg (125 lb 11.2 oz).  Physical Exam  GENERAL: alert and no distress  EYES: Eyes grossly normal to inspection, PERRL and conjunctivae and sclerae normal  HENT: ear canals and TM's normal, nose and mouth without ulcers or lesions  NECK: no adenopathy, no asymmetry, masses, or scars  RESP: lungs clear to auscultation - no rales, rhonchi or wheezes  CV: regular rate and rhythm, normal S1 S2, no S3 or S4, no murmur, click or rub, no peripheral edema  ABDOMEN: soft, some tenderness around umbilical hernia but doesn't feel firm and no redness noted.  Two tube sites presents without redness  MS: no gross musculoskeletal defects noted, no edema  SKIN: no suspicious lesions or rashes  NEURO: Normal strength and tone, mentation intact and speech normal  PSYCH: mentation appears normal, affect flat, tearful, and catastrophizing type thoughts     Recent Labs   Lab Test " 11/15/23  0700 11/12/23  0649 11/11/23  0615 11/10/23  0716 11/08/23 2011   HGB  --   --   --  12.2 11.6*   PLT  --   --   --  328 299   INR 1.15  --   --   --  1.11    140   < > 139 139   POTASSIUM 4.6 3.6   < > 3.7 4.1   CR 0.56 0.53   < > 0.46* 0.51    < > = values in this interval not displayed.        Diagnostics  Recent Results (from the past 24 hour(s))   CBC with platelets    Collection Time: 03/20/24  3:42 PM   Result Value Ref Range    WBC Count 6.0 4.0 - 11.0 10e3/uL    RBC Count 4.36 3.80 - 5.20 10e6/uL    Hemoglobin 13.2 11.7 - 15.7 g/dL    Hematocrit 40.7 35.0 - 47.0 %    MCV 93 78 - 100 fL    MCH 30.3 26.5 - 33.0 pg    MCHC 32.4 31.5 - 36.5 g/dL    RDW 13.3 10.0 - 15.0 %    Platelet Count 377 150 - 450 10e3/uL   Comprehensive metabolic panel    Collection Time: 03/20/24  3:42 PM   Result Value Ref Range    Sodium 140 135 - 145 mmol/L    Potassium 4.4 3.4 - 5.3 mmol/L    Carbon Dioxide (CO2) 28 22 - 29 mmol/L    Anion Gap 11 7 - 15 mmol/L    Urea Nitrogen 16.5 6.0 - 20.0 mg/dL    Creatinine 0.57 0.51 - 0.95 mg/dL    GFR Estimate >90 >60 mL/min/1.73m2    Calcium 9.4 8.6 - 10.0 mg/dL    Chloride 101 98 - 107 mmol/L    Glucose 99 70 - 99 mg/dL    Alkaline Phosphatase 101 40 - 150 U/L    AST 24 0 - 45 U/L    ALT 14 0 - 50 U/L    Protein Total 7.5 6.4 - 8.3 g/dL    Albumin 4.5 3.5 - 5.2 g/dL    Bilirubin Total 0.3 <=1.2 mg/dL   Lipid panel reflex to direct LDL Non-fasting    Collection Time: 03/20/24  3:42 PM   Result Value Ref Range    Cholesterol 164 <200 mg/dL    Triglycerides 101 <150 mg/dL    Direct Measure HDL 50 >=50 mg/dL    LDL Cholesterol Calculated 94 <=100 mg/dL    Non HDL Cholesterol 114 <130 mg/dL    Patient Fasting > 8hrs? No    HEMOGLOBIN A1C    Collection Time: 03/20/24  3:42 PM   Result Value Ref Range    Hemoglobin A1C 5.6 <5.7 %      No EKG required for low risk surgery (cataract, skin procedure, breast biopsy, etc).    Revised Cardiac Risk Index (RCRI)  The patient has the  following serious cardiovascular risks for perioperative complications:   - Diabetes Mellitus (on Insulin) = 1 point     RCRI Interpretation: 0 points: Class I (very low risk - 0.4% complication rate)         Signed Electronically by: Juan Jose Gomez MD  Copy of this evaluation report is provided to requesting physician.         Answers submitted by the patient for this visit:  Patient Health Questionnaire (Submitted on 3/19/2024)  If you checked off any problems, how difficult have these problems made it for you to do your work, take care of things at home, or get along with other people?: Somewhat difficult  PHQ9 TOTAL SCORE: 17

## 2024-03-20 NOTE — PROGRESS NOTES
INTERVENTIONAL RADIOLOGY CLINIC NOTE  03/20/24    Referring Provider:  Vijaya Genao MD, Ashley Mane RN coordinator    Patient Name:  Dinora Mcghee  Medical Record Number:  [id number]  YOB: 1965  Reason for Visit:  to discuss central venous catheter options    Impression: Dinora Mcghee is requiring daily IV access for fluids, medications and eventually TPN.  Discussed risks and benefits associated with venous chest port placement vs tunneled central venous catheter.     Plan:  Pt would like to proceed with tunneled double lumen central venous catheter placement, (CT power injectable) with moderate sedation.  If PICC is still in place, will use for sedation and remove after procedure.      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       Pt had a RIJV chest port placed 6/10/2022 for thrice weekly IV fluids and phenergan that required removal on 11/9/2023 due to skin breakdown and CLABSI Klebsiella pneumoniae    Pt currently has double lumen PICC via left basilic vein placed by vascular access team on 11/8/2023.  Patient reports PICC is uncomfortable and occasionally has trouble drawing blood from the catheter.      Patient reports she currently requires daily IV access.  Has requires lactated ringers 5 times a week, IV pheregan three times a day, protonix, benadryl and compazine.  She anticipates she will require daily IV access.  She is seeing her colorectal surgeon and they are going to place her back on TPN soon.      PMH: IDDM, hypothyroidism, pituitary adenoma s/p resection, uterine  tumor s/p hysterectomy, sphincter of Oddi dysfunction, depression, GERD, sensorineural hearing loss. Vertiginous syndrome and labyrinthine disorder.    The procedure along with risks and benefits of procedure, sedation explained to patient which includes but is not limited to bleeding, infection, catheter malposition, injury to adjacent nerves/vessels/organ systems, pneumothorax, air embolus, catheter associated thrombosis, all of which may require additional treatment or procedures.     Albumin:    Latest Reference Range & Units 11/15/23 07:00   Albumin 3.5 - 5.2 g/dL 3.9     Imaging Reviewed:  XR CXR 2023     Angela Man PA-C  Interventional Radiology  [phone] (IR control)  [phone] (pager)    =====    Past Medical History:  Past Medical History:   Diagnosis Date    Allergic rhinitis, cause unspecified     Allergy to other foods     strawberries, apples, celeries, alice, watermelon    Arthritis     left knee    Choledocholithiasis     long after cholecystectomy    Chronic abdominal pain     Chronic constipation     Chronic nausea     Chronic pancreatitis (H)     Degeneration of lumbar or lumbosacral intervertebral disc     Esophageal reflux     w/ hiatal hernia    Gastroparesis     Hiatal hernia     History of pituitary adenoma     s/p resection    Hypothyroidism     Migraines     Mild hyperlipidemia     On tube feeding diet     presence of GJ tube    Pancreatic disease     PD stricture, 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     PONV (postoperative nausea and vomiting)     Portacath in place     Type 1 diabetes mellitus without complication (H) 2022    Unspecified hearing loss     25% high frequency R       Past Surgical History:  Past Surgical History:   Procedure Laterality Date    ABDOMEN SURGERY  1999    c sections: 93, 96, 98. endometriosis growth    APPENDECTOMY       SECTION  1993    COLONOSCOPY  2014    COLONOSCOPY N/A 2023    Procedure:  COLONOSCOPY, WITH POLYPECTOMY AND BIOPSY;  Surgeon: Percy Chamorro MD;  [facility]    ENDOSCOPIC INSERTION TUBE GASTROSTOMY N/A 11/28/2021    Procedure: Gastrojejonostomy placement with jejunopexy, PEG tube exchange;  Surgeon: Zackery Montoya MD;  [facility]    ENDOSCOPIC RETROGRADE CHOLANGIOPANCREATOGRAM N/A 06/02/2015    Procedure: ENDOSCOPIC RETROGRADE CHOLANGIOPANCREATOGRAM;  Surgeon: Mandeep Park MD;  [facility]    ENDOSCOPIC RETROGRADE CHOLANGIOPANCREATOGRAM N/A 02/09/2016    Procedure: COMBINED ENDOSCOPIC RETROGRADE CHOLANGIOPANCREATOGRAPHY, PLACE TUBE/STENT;  Surgeon: Mandeep Park MD;  [facility]    ENDOSCOPIC RETROGRADE CHOLANGIOPANCREATOGRAM N/A 03/17/2016    Procedure: COMBINED ENDOSCOPIC RETROGRADE CHOLANGIOPANCREATOGRAPHY, REMOVE FOREIGN BODY OR STENT/TUBE;  Surgeon: Mandeep Park MD;  [facility]    ENDOSCOPIC RETROGRADE CHOLANGIOPANCREATOGRAM N/A 08/02/2016    Procedure: COMBINED ENDOSCOPIC RETROGRADE CHOLANGIOPANCREATOGRAPHY, PLACE TUBE/STENT;  Surgeon: Mandeep Park MD;  [facility]    ENDOSCOPIC RETROGRADE CHOLANGIOPANCREATOGRAM N/A 08/26/2016    Procedure: COMBINED ENDOSCOPIC RETROGRADE CHOLANGIOPANCREATOGRAPHY, REMOVE FOREIGN BODY OR STENT/TUBE;  Surgeon: Mandeep Park MD;  [facility]    ENDOSCOPIC ULTRASOUND UPPER GASTROINTESTINAL TRACT (GI) N/A 10/03/2016    Procedure: ENDOSCOPIC ULTRASOUND, ESOPHAGOSCOPY / UPPER GASTROINTESTINAL TRACT (GI);  Surgeon: Guru Jose Rodas MD;  [facility]    ENT SURGERY  1989    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    ENTEROSCOPY SMALL BOWEL N/A 02/03/2022    Procedure: ENTEROSCOPY with possible fistula closure;  Surgeon: Francisco Dodson MD;  Location:  GI    ESOPHAGOSCOPY, GASTROSCOPY, DUODENOSCOPY (EGD), COMBINED N/A 06/24/2015    Procedure: COMBINED ESOPHAGOSCOPY, GASTROSCOPY, DUODENOSCOPY (EGD), REMOVE FOREIGN BODY;  Surgeon: Mandeep Park  MD Eduardo;  [facility]    ESOPHAGOSCOPY, GASTROSCOPY, DUODENOSCOPY (EGD), COMBINED N/A 10/25/2015    Procedure: COMBINED ESOPHAGOSCOPY, GASTROSCOPY, DUODENOSCOPY (EGD);  Surgeon: Sammy Amaro MD;  [facility]    ESOPHAGOSCOPY, GASTROSCOPY, DUODENOSCOPY (EGD), COMBINED N/A 10/25/2015    Procedure: COMBINED ESOPHAGOSCOPY, GASTROSCOPY, DUODENOSCOPY (EGD), BIOPSY SINGLE OR MULTIPLE;  Surgeon: Sammy Amaro MD;  [facility]    ESOPHAGOSCOPY, GASTROSCOPY, DUODENOSCOPY (EGD), COMBINED N/A 01/31/2023    Procedure: ESOPHAGOGASTRODUODENOSCOPY (EGD) with peg replacement ;  Surgeon: Mandeep Park MD;  [facility]    ESOPHAGOSCOPY, GASTROSCOPY, DUODENOSCOPY (EGD), DILATATION, COMBINED      EXCISE LESION TRUNK N/A 04/17/2017    Procedure: EXCISE LESION TRUNK;  Removal of Abdominal Foreign Body;  Surgeon: Nestor Phoenix MD;  [facility]    HC ESOPH/GAS REFLUX TEST W NASAL IMPED >1 HR N/A 11/19/2015    Procedure: ESOPHAGEAL IMPEDENCE FUNCTION TEST WITH 24 HOUR PH GREATER THAN 1 HOUR;  Surgeon: Thiago Apple MD;  [facility]    HC UGI ENDOSCOPY DIAG W BIOPSY  09/17/2008    HC UGI ENDOSCOPY DIAG W BIOPSY  09/27/2012    HC UGI ENDOSCOPY W ESOPHAGEAL DILATION BALLOON <30MM  09/17/2008    HC UGI ENDOSCOPY W EUS N/A 05/05/2015    Procedure: COMBINED ENDOSCOPIC ULTRASOUND, ESOPHAGOSCOPY, GASTROSCOPY, DUODENOSCOPY (EGD);  Surgeon: Wm Dueñas MD;  [facility]    HC WRIST ARTHROSCOP,RELEASE XVERS LIG Bilateral 12/17/2008    INJECT TRANSVERSUS ABDOMINIS PLANE (TAP) BLOCK BILATERAL Left 09/22/2016    Procedure: INJECT TRANSVERSUS ABDOMINIS PLANE (TAP) BLOCK BILATERAL;  Surgeon: Dickson Corrigan MD;  [facility]    INJECT TRIGGER POINT Bilateral 09/08/2022    Procedure: Abdominal trigger point injection with ultrasound;  Surgeon: Monika Mahajan MD;  [facility]    INJECT TRIGGER POINT SINGLE / MULTIPLE 3 OR MORE MUSCLES Right 11/15/2022    Procedure: Trigger  point injections right abdomen with ultrasound guidance;  Surgeon: Monika Mahajan MD;  [facility]    IR CHEST PORT PLACEMENT < 5 YRS OF AGE  06/10/2022    IR PORT REMOVAL RIGHT  11/09/2023    laparoscopic pineda  01/01/1995    LAPAROSCOPIC HERNIORRHAPHY INCISIONAL N/A 08/23/2018    Procedure: LAPAROSCOPIC HERNIORRHAPHY INCISIONAL;  Laparoscopic Incisional Hernia Repair with Symbotex Mesh Implant;  Surgeon: Nestor Phoenix MD;  [facility]    LAPAROSCOPIC PANCREATECTOMY, TRANSPLANT AUTO ISLET CELL N/A 12/28/2016    Procedure: LAPAROSCOPIC PANCREATECTOMY, TRANSPLANT AUTO ISLET CELL;  Surgeon: Nestor Phoenix MD;  [facility]    LAPAROTOMY EXPLORATORY N/A 01/31/2023    Procedure: Exploratory Laparotomy, lysis of adhesions, Perforated J-Junostomy Resection, Replace J-Junostomy site;  Surgeon: Elver Bauer MD;  [facility]    LAPAROTOMY, LYSIS ADHESIONS, COMBINED N/A 01/31/2023    Procedure: Dilatation of jejunostomy feeding tube, track with placement of jejunostomy tube with fluoroscopy;  Surgeon: Elver Bauer MD;  [facility]    PICC DOUBLE LUMEN PLACEMENT Left 11/13/2023    Basilic Vein 5F DL 43 cm    REMOVE AND REPLACE BREAST IMPLANT PROSTHESIS N/A 12/30/2022    Procedure: Exploratory Laparotomy, lysis of adhesions, small bowel resection. Placement of gastric jejunostomy for enteral feeding.;  Surgeon: Elver Bauer MD;  [facility]    REMOVE GASTROSTOMY TUBE ADULT N/A 01/28/2022    Procedure: REMOVAL, GASTROSTOMY TUBE, ADULT;  Surgeon: Mandeep Park MD;  [facility]    REPLACE GASTROJEJUNOSTOMY TUBE, PERCUTANEOUS N/A 09/07/2021    Procedure: GASTROJEJUNOSTOMY TUBE PLACEMENT, PERCUTANEOUS, WITH GASTROPEXY;  Surgeon: Mandeep Park MD;  [facility]    REPLACE GASTROJEJUNOSTOMY TUBE, PERCUTANEOUS N/A 09/22/2021    Procedure: REPLACEMENT, GASTROJEJUNOSTOMY TUBE, PERCUTANEOUS;  Surgeon: Zackery Montoya MD;  [facility]     REPLACE GASTROJEJUNOSTOMY TUBE, PERCUTANEOUS N/A 2022    Procedure: REPLACEMENT, GASTROJEJUNOSTOMY TUBE, PERCUTANEOUS;  Surgeon: Mandeep Park MD;  [facility]    REPLACE GASTROJEJUNOSTOMY TUBE, PERCUTANEOUS N/A 2022    Procedure: REPLACEMENT, GASTROJEJUNOSTOMY TUBE, PERCUTANEOUS with ENDOSCOPIC SUTURING.;  Surgeon: Mandeep Park MD;  [facility]    REPLACE GASTROJEJUNOSTOMY TUBE, PERCUTANEOUS N/A 2023    Procedure: Replace Gastrojejunostomy Tube, Percutaneous;  Surgeon: Mandeep Park MD;  [facility]    REPLACE GASTROJEJUNOSTOMY TUBE, PERCUTANEOUS N/A 2023    Procedure: Replace Gastrojejunostomy Tube, Percutaneous;  Surgeon: Francisco Dodson MD;  [facility]    REPLACE GASTROJEJUNOSTOMY TUBE, PERCUTANEOUS N/A 2023    Procedure: Replace Gastrojejunostomy Tube, Percutaneous;  Surgeon: Mandeep Park MD;  [facility]    REPLACE JEJUNOSTOMY TUBE, PERCUTANEOUS N/A 09/10/2021    Procedure: UPPER ENDOSCOPY, REPLACEMENT OF PERCUTANEOUS GASTROJEJUNOSTOMY TUBE, T-TAG GASTROPEXY;  Surgeon: Zackery Montoya MD;  [facility]    REPLACE JEJUNOSTOMY TUBE, PERCUTANEOUS N/A 2021    Procedure: REPLACEMENT, JEJUNOSTOMY TUBE, PERCUTANEOUS;  Surgeon: Mandeep Park MD;  [facility]    REPLACE JEJUNOSTOMY TUBE, PERCUTANEOUS N/A 2023    Procedure: REPLACEMENT, JEJUNOSTOMY TUBE, PERCUTANEOUS;  Surgeon: Mandeep Park MD;  [facility]    REPLACE JEJUNOSTOMY TUBE, PERCUTANEOUS  2023    Procedure: Replace Jejunostomy Tube, Percutaneous;  Surgeon: Mandeep Park MD;  [facility]    transphenoidal pituitary resection  1990    Z  DELIVERY ONLY  1996    ZZ  DELIVERY ONLY  1998    repeat c section with incidental cystotomy with repair    ZZC EXCIS PITUITARY,TRANSNASAL/SEPTAL  1980    pituitary tumor removed for diabetes insipidus    Z TOTAL ABDOM HYSTERECTOMY   01/01/1999    w/ bilateral salpingoophorectomy        Problem List:  Patient Active Problem List    Diagnosis Date Noted    Bacteremia 11/10/2023     Priority: Medium    Abdominal pain, unspecified abdominal location 11/09/2023     Priority: Medium    Cholangitis (H28) 06/07/2023     Priority: Medium    On total parenteral nutrition (TPN) 06/04/2023     Priority: Medium    Fever, unspecified fever cause 06/04/2023     Priority: Medium    Chronic pancreatitis, unspecified pancreatitis type (H) 06/04/2023     Priority: Medium    History of food intolerance 05/09/2023     Priority: Medium    Malnutrition, unspecified type (H24) 05/09/2023     Priority: Medium    Nausea and vomiting, unspecified vomiting type 05/09/2023     Priority: Medium    Major depression, single episode 03/06/2023     Priority: Medium    Acute postoperative abdominal pain 01/31/2023     Priority: Medium    Feeding intolerance 12/30/2022     Priority: Medium    Myofascial pain 10/14/2022     Priority: Medium     Added automatically from request for surgery [id number]      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  pancreas and spleen removed, islet cells transplanted into liver    3 classes of vaccines needed .    1. Pneumococcal vaccines are as follows:       PCV 13 - one time dose (was given 2017)       PPSV23 - (given 12/1/16); repeat q 5 years      ROLE of PCV 20???    2. Meningococcal vaccines     Menactra - (last given 12/1/16) -  repeat q 5 yrs   NOTE: need to wait 4 wks after PCV 13 has been given.   OR - give Menveo instead as it can be given same time as PCV 13    AND   Bexcero - (got 12/9/16) - does not need to be repeated.     3. The Hib vaccine - (got 12/9/16) - does not need to be repeated.      Poisoning by drug 05/09/2022     Priority: Medium     Formatting of this note might be different from the original.  Per Care Everywhere review:  All oral, IV and  injectable steroids are contraindicated (unless in life threatening situations) in Islet Auto transplant recipients. They can cause irreversible loss of islet cell function. Please contact patient's transplant care coordinator ADI Gaffney RN at [phone]/pager [phone] and/or endocrinologist prior to administration.      Type 1 diabetes mellitus without complication (H) 05/09/2022     Priority: Medium     Formatting of this note might be different from the original.  ACTUALLY - DM 3c - pathogenic diabetes as no pancreas.  (pancreas and spleen removed, islet cells transplanted into liver)    Seeing ENDO at [facility] - Dr Erum Melissa      Intra-abdominal abscess (H) 12/03/2021     Priority: Medium    Postprocedural intraabdominal abscess 12/03/2021     Priority: Medium    Gastrostomy tube obstruction (H) 11/27/2021     Priority: Medium    Abdominal pain, generalized 09/18/2021     Priority: Medium    Adult failure to thrive 08/16/2021     Priority: Medium     Added automatically from request for surgery [id number]      Hypoglycemia 08/05/2021     Priority: Medium    Iron deficiency anemia 03/22/2019     Priority: Medium    Headache, chronic migraine without aura 09/18/2018     Priority: Medium    Chronic pain syndrome 09/18/2018     Priority: Medium    S/P hernia repair 08/23/2018     Priority: Medium    Incisional hernia, without obstruction or gangrene 05/20/2018     Priority: Medium    Adhesive capsulitis of shoulder, unspecified laterality 11/14/2017     Priority: Medium    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  Excess IgG4  [facility]      Gastroparesis      Priority: Medium    ACP (advance care planning) 03/28/2017     Priority: Medium     Advance Care Planning 3/28/2017: Receipt of ACP document:  Received: Health Care  Directive which was witnessed or notarized on 12/8/16.  Document previously scanned on 1/12/17.  Validation form completed and scanned.  Code Status reflects choices in most recent ACP document..  Confirmed/documented designated decision maker(s).  Added by May Baires   Advance Care Planning Liaison              Pancreatic insufficiency 01/17/2017     Priority: Medium    Diabetes insipidus (H24) 01/05/2017     Priority: Medium     Formatting of this note might be different from the original.  Diabetes Insipidus (DI)  Per external records.  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  Depression NOS      Other type of intractable migraine      Priority: Medium     Diagnosis updated by automated process. Provider to review and confirm.      GERD (gastroesophageal reflux disease) 12/01/2010     Priority: Medium    Lumbago 04/18/2005     Priority: Medium     History of L5-S1 degenerative disk disease.        Sensorineural hearing loss 01/10/2005     Priority: Medium     Problem list name updated by automated process. Provider to review      Vertiginous syndrome and labyrinthine disorder 01/10/2005     Priority: Medium     Problem list name updated by automated process. Provider to review  IMO Regulatory Load OCT 2020      Sprain and strain of other specified sites of hip and thigh 12/09/2002     Priority: Medium    Chondromalacia of patella 12/09/2002     Priority: Medium    Need for prophylactic immunotherapy      Priority: Medium     trees, grass, srw, dust mites, cat      Allergic rhinitis due to other allergen 12/21/2001      Priority: Medium    Hypothyroidism      Priority: Medium     Problem list name updated by automated process. Provider to review         Medications:  Prescription Medications as of 3/20/2024         Rx Number Disp Refills Start End Last Dispensed Date Next Fill Date Owning Pharmacy    acetaminophen (TYLENOL) 325 MG/10.15ML solution  473 mL 4 2/15/2023 --   [id number] - [place], [place] - [address] AT [facility] [address]    Si.3 mLs (650 mg) by Per J Tube route every 6 hours as needed for mild pain or fever    Class: E-Prescribe    Route: Per J Tube    amitriptyline (ELAVIL) 10 MG tablet  120 tablet 11 2023 --   [facility] - [address]    Sig: Take 4 tablets (40 mg) by mouth At Bedtime    Class: E-Prescribe    Notes to Pharmacy: The patient requests that this prescription be held on file for filling in the near future.    Route: Oral    baclofen (LIORESAL) 10 MG tablet  60 tablet 1 11/15/2023 --   [facility] - [address]    Si-2 tablets (10-20 mg) by Per G Tube route 3 times daily    Class: E-Prescribe    Route: Per G Tube    Renewals       Renewal requests to authorizing provider (Ammy Bolden MD) <b>prohibited</b>            buprenorphine (SUBUTEX) 2 MG SUBL sublingual tablet  -- -- 2024 --       Sig: Place 2 mg under the tongue 2 times daily    Class: Historical    Route: Sublingual    cetirizine (ZYRTEC) 10 MG tablet  30 tablet 11 2023 --   [address]    Si tablet (10 mg) by Per G Tube route daily    Class: E-Prescribe    Route: Per G Tube    COMPOUNDED NON-CONTROLLED SUBSTANCE (CMPD RX) - PHARMACY TO MIX COMPOUNDED MEDICATION  60 each 3 2024 --   [facility] [place] [address] [facility]    Sig: Place 1 enema rectally 2 times daily as needed (for constipation)    Class:  E-Prescribe    Route: Rectal    COMPRO 25 MG suppository  10 suppository 0 2024 --   [facility] [address] [address]    Sig: INSERT 1 SUPPOSITORY RECTALLY EVERY 12 HOURS AS NEEDED FOR NAUSEA    Class: E-Prescribe    Digestive Enzyme Cartridge (RELIZORB) MARCO  60 each 6 2023 --       Si Cartridges daily    Class: No Print Out    Route: As instructed    diphenhydrAMINE (BENADRYL) 12.5 MG/5ML solution  -- --  --       Sig: Take 25 mg by mouth 4 times daily as needed for other (nausea)    Class: Historical    Route: Oral    droNABinol (SYNDROS) 5 MG/ML oral soln  60 mL 0 2024 --   [facility] [address] [address]    Sig: Take 0.5 mLs (2.5 mg) by mouth 2 times daily    Class: E-Prescribe    Earliest Fill Date: 2024    Notes to Pharmacy: The patient requests that this prescription be held on file for filling in the near future.    Route: Oral    estradiol (VAGIFEM) 10 MCG TABS vaginal tablet  24 tablet 2 3/6/2023 --   [facility] [address] [address]    Sig: INSERT 1 TABLET(10 MCG) VAGINALLY 2 TIMES A WEEK    Class: E-Prescribe    Notes to Pharmacy: The patient requests that this prescription be held on file for filling in the near future.    famotidine (PEPCID) 40 MG/5ML suspension  50 mL 4 3/11/2024 --   [facility] [address] [address]    Si.5 mLs (20 mg) by Per J Tube route daily    Class: E-Prescribe    Route: Per J Tube    glucagon 1 MG kit  10 each 3 3/3/2023 --   [facility] [address] [address]    Sig: Give 0.1 to 0.15mg( 10-15 units on Insulin sryinge) subcutaneous  every 15 minutes PRN for hypoglycemia. Remix new kit q24hr. Needs up to 3 kit/week.    Class: E-Prescribe    glucose 40 % GEL gel  3 Tube 2 2017 --   [facility] [place] - [address] [facility]    Sig:  "Take 15-30 g by mouth every 15 minutes as needed for low blood sugar    Class: E-Prescribe    Route: Oral    insulin aspart (NOVOLOG PEN) 100 UNIT/ML pen  15 mL 11 2023 --   [id number] [address] AT [place] [address]    Sig: Take 1 unit if BG is 175-275, and 2 units if >275 up to every 4 hours as needed.  \"Prime\" pen needle with 2 unit airshot before giving, needs supply for 15 unit/day.    Class: E-Prescribe    insulin detemir (LEVEMIR PEN) 100 UNIT/ML pen  15 mL 5 3/19/2024 --   [id number] - [facility] [address] AT [place] [address]    Sig: Inject 5 Units Subcutaneous at bedtime    Class: E-Prescribe    Route: Subcutaneous    Insulin Glargine-yfgn (SEMGLEE, YFGN,) 100 UNIT/ML SOPN  15 mL 3 2023 --   [facility] [place] [address] [address]    Sig: Inject 3 Units Subcutaneous every 24 hours    Class: E-Prescribe    Route: Subcutaneous    insulin syringe-needle U-100 (30G X 1/2\" 0.3 ML) 30G X 1/2\" 0.3 ML miscellaneous  100 each 11 2023 --   [facility] [place] [address] [address]    Sig: Use 3 syringes daily or as directed.    Class: E-Prescribe    lactated ringers infusion  -- -- 11/15/2023 --       Sig: Inject 1,000 mLs into the vein daily as needed    Class: Historical    Route: Intravenous    levothyroxine (SYNTHROID/LEVOTHROID) 137 MCG tablet  90 tablet 3 2023 --   [facility] [place] [address]    Si tablet (137 mcg) by Per G Tube route daily    Class: E-Prescribe    Notes to Pharmacy: The patient requests that this prescription be held on file for filling in the near future.    Route: Per G Tube    lidocaine (LIDODERM) 5 % patch  -- --  --       Sig: Place onto the skin every 24 hours To prevent lidocaine toxicity, patient should be patch free for 12 hrs daily.    Class: Historical    Route: Transdermal    " lipase-protease-amylase (CREON) [id number] units CPEP per EC capsule  150 capsule 11 11/22/2023 --   [facility] [place] [address]    Sig: Take 3-4 capsules by mouth 3 times daily (with meals) With oral meals    Class: E-Prescribe    Route: Oral    methocarbamol (ROBAXIN) 750 MG tablet  120 tablet 11 6/13/2023 --   [facility] [place] [address] [address]    Sig: Take 1 tablet (750 mg) by mouth 4 times daily as needed for muscle spasms    Class: E-Prescribe    Route: Oral    MOTEGRITY 2 MG tablet  30 tablet 6 1/22/2024 --   [facility] [place] [address] [address]    Sig: TAKE ONE TABLET BY MOUTH ONCE DAILY    Class: E-Prescribe    Route: Oral    No prior authorization was found for this prescription.    Found prior authorization for another prescription for the same medication: Approved    Nutritional Supplements (BOOST HIGH PROTEIN) LIQD  -- 0 1/19/2022 --       Sig: After above baseline labs are drawn, give: 6 mL/kg to maximum of 360 mL; the beverage is to be consumed within 5 minutes.    Class: No Print Out    Notes to Pharmacy: If on pancreatic enzymes, patient may take home enzymes with Boost beverage.      Patients may take long acting insulin (Levemir and Lantus).      Patient should NOT cover Boost with Novolog, Humalog, Apidra, or regular insulin.    pantoprazole (PROTONIX) 40 mg IV push injection  30 each 11 5/13/2023 --       Sig: Inject 40 mg into the vein daily (with breakfast)    Class: Local Print    Notes to Pharmacy: Patient gets it from her home infusion    Route: Intravenous    prochlorperazine (COMPAZINE) 10 MG tablet  120 tablet 3 3/8/2024 --   [facility] - [place] [address]    Sig: Take 1 tablet (10 mg) by mouth every 6 hours as needed for nausea or vomiting    Class: E-Prescribe    Route: Oral    promethazine 25 mg  -- -- 11/15/2023 --       Sig:  "Inject 25 mg into the vein 3 times daily    Class: Historical    Route: Intravenous    rimegepant (NURTEC) 75 MG ODT tablet  8 tablet 11 2023 --   [id number] - [place], [place] - [address] AT [address] OF [place] [address]    Sig: Place 1 tablet (75 mg) under the tongue daily as needed for migraine Maximum of 1 tablet every 24 hours.    Class: E-Prescribe    Route: Sublingual    scopolamine (TRANSDERM) 1 MG/3DAYS 72 hr patch  10 patch 11 2024 --   [id number] - [place], [place] - [address] AT [facility] OF [place] [address]    Sig: Place 1 patch onto the skin every 72 hours - Transdermal    Class: E-Prescribe    sennosides (SENOKOT) 8.8 MG/5ML syrup  236 mL 11 2023 --   [id number] - [place], [place] - [address] AT [facility] OF [place] [address]    Si mLs by Per J Tube route 2 times daily    Class: E-Prescribe    Route: Per J Tube    Sharps Container (BD SHARPS ) MISC  1 each 1 2017 --   [facility] - [place], [place] - [address]    Si Container as needed    Class: E-Prescribe    Route: Does not apply    silver nitrate (ARZOL SILVER NIT APPLICATORS) 75-25 % miscellaneous  30 applicator 0 2023 --   [facility] - [place], [place] - [address]    Sig: Apply topically as needed for wound care Apply Silver Nitrate Sticks every 2-3 days until granulation tissue resolved.  \"Roll\" tip of Silver Nitrate Q tip directly onto the granulation tissue-bulging tissue area.  Have Agency or Home Care Nurse administer this, if you prefer.  Take care not to touch any healthy tissue or skin.  The tissue will turn white and eventually black & scab off.  May repeat if needed in the future for recurrence.    Class: E-Prescribe    Route: Topical    sodium phosphate, mineral oil, magnesium citrate enema  -- -- 2024 --       Sig: Instill 1 pink 187 ml enema twice daily as needed for constipation    Class: No " Print Out    STATIN NOT PRESCRIBED (INTENTIONAL)  -- --  --       Sig: Previous liver issues, risks vs benefits felt to not warrant statin.    Discussed Oct 2022 visit    Class: Historical    Route: Does not apply    zinc oxide - white petrolatum (CRITIC-AID THICK MOIST BARRIER) 20-51% PSTE topical paste  170 g 0 2/5/2023 --   [facility] - [place], [place] - [address] [facility]    Sig: Apply 71 g topically every hour as needed for rash (Under J tube bumper when needed for skin protection)    Class: E-Prescribe    Route: Topical    ZOLMitriptan (ZOMIG-ZMT) 5 MG ODT  12 tablet 6 10/24/2023 --   [id number] - [place], [place] - [address] AT [facility] [address]     Sig: Take 1 tablet (5 mg) by mouth at onset of headache for migraine Dissolve tablet in the mouth. May repeat in 2 hours. Max 2 tablets/24 hours.    Class: E-Prescribe    Route: Oral          Clinic-Administered Medications as of 3/20/2024         Dose Frequency Start End    Botulinum Toxin Type A (BOTOX) 200 units injection 155 Units 155 Units SEE ADMIN INSTRUCTIONS 6/6/2023 --    Admin Instructions: [id number]    Route: Intramuscular            Allergies:  Allergies   Allergen Reactions    Apple Juice Anaphylaxis    Corticosteroids Other (See Comments)     All oral, IV and injectable steroids are contraindicated (unless in life threatening situations) in [facility] [employer] transplant recipients. They can cause irreversible loss of islet cell function. Please contact patient's transplant care coordinator ADI Gaffney RN at [phone]/pager [phone] and/or endocrinologist prior to administration.      Depakote [Divalproex Sodium] Other (See Comments)     Chest pain    Zithromax [Azithromycin Dihydrate] Anaphylaxis     Noted in 4/7/08 ER    Bromfenac      Other reaction(s): Headache    Codeine      Other reaction(s): Hallucinations    Darvocet [Propoxyphene N-Apap] Itching    Morphine Nausea and Vomiting and Rash     "Nalbuphine Hcl Rash     RASH :nubaine    Zosyn [Piperacillin-Tazobactam In Dex] Rash     Possible allergy, did have a diffuse rash that seemed drug related but could have also been related to soap in the hospital.     Bactrim [Sulfamethoxazole W-Trimethoprim] Other (See Comments) and Nausea and Vomiting     Severely low liver function.    Statins Other (See Comments)     Previous liver issues, risks vs benefits felt to not warrant statin.  Discussed Oct 2022 visit    Tape [Adhesive Tape] Blisters    Tramadol     Zofran [Ondansetron] Other (See Comments)     migraine    Flagyl [Metronidazole] Hives and Rash       Results Reviewed:  Lab Results   Component Value Date     11/10/2023     12/17/2020     Lab Results   Component Value Date    INR 1.15 11/15/2023    INR 1.05 01/16/2018       Vital Signs:  Ht 1.727 m (5' 8\")   Wt 55.8 kg (123 lb)   BMI 18.70 kg/m    General:  alert and oriented times 3 in NAD  Chest:  non labored breathing on room air.    =====    Phone call Duration:  20 minutes  Chart Review which includes medical history, imaging, labs, medications, allergies, telephone visit and documentation:  45 minutes    CC:  1) Referring Provider  Juan Jose Gomez MD  [address] [place] [id number]    2) Primary Care Provider  Juan Jose Gomez MD  [address] [place] [id number]      "

## 2024-03-20 NOTE — PROGRESS NOTES
Preventive Care Visit  [facility]  Juan Jose Gomez MD, Internal Medicine  Mar 20, 2024        Curtis Farias is a 58 year old, presenting for the following:  Maria Eugenia aFrias is having a lot of depression and frustration about her health.  She states she just wants to be done, though isn't suicidal.  She is not optimistic that medication will be helpful as was suggested by GI.  She is following with psychology.      She is going to have her PICC replaced by a tunneled catheter.  Although this appointment was scheduled as a general physical, she brought up during the appointment that she hoped it could serve as a pre-op as well, so a new note template was started for the pre-op.  Screening questions below saved in this note- see other note for objective, assessment, and plan.           3/19/2024   General Health   How would you rate your overall physical health? (!) FAIR   Feel stress (tense, anxious, or unable to sleep) To some extent   (!) STRESS CONCERN      3/19/2024   Nutrition   Three or more servings of calcium each day? (!) I DON'T KNOW   Diet: Diabetic    Other   If other, please elaborate: Low fat, low fiber, diabetic and gastroparesis friendly   How many servings of fruit and vegetables per day? (!) I DON'T KNOW   How many sweetened beverages each day? 0-1         3/19/2024   Exercise   Days per week of moderate/strenous exercise 4 days   Average minutes spent exercising at this level 40 min         3/19/2024   Social Factors   Frequency of gathering with friends or relatives Once a week   Worry food won't last until get money to buy more No   Food not last or not have enough money for food? No   Do you have housing?  Yes   Are you worried about losing your housing? No   Lack of transportation? No   Unable to get utilities (heat,electricity)? No         3/19/2024   Fall Risk   Fallen 2 or more times in the past year? No   Trouble with walking or balance?  No          3/19/2024   Dental   Dentist two times every year? Yes          Today's PHQ-9 Score:       3/19/2024     8:41 AM   PHQ-9 SCORE   PHQ-9 Total Score Neilhart 17 (Moderately severe depression)   PHQ-9 Total Score 17         2023     3:02 PM 2023     1:01 PM 3/19/2024     8:41 AM   PHQ   PHQ-9 Total Score 9 18 17   Q9: Thoughts of better off dead/self-harm past 2 weeks Not at all Not at all Several days   F/U: Thoughts of suicide or self-harm   No   F/U: Safety concerns   No             3/19/2024   Substance Use   Alcohol more than 3/day or more than 7/wk Not Applicable   Do you use any other substances recreationally? No     Social History     Tobacco Use    Smoking status: Former     Packs/day: 0.50     Years: 6.00     Additional pack years: 0.00     Total pack years: 3.00     Types: Cigarettes     Start date: 1985     Quit date: 1992     Years since quittin.2    Tobacco comments:     no 2nd hand   Vaping Use    Vaping Use: Some days    Substances: THC, CBD   Substance Use Topics    Alcohol use: Not Currently     Alcohol/week: 3.0 - 6.0 standard drinks of alcohol     Types: 1 - 2 Glasses of wine, 1 - 2 Cans of beer, 1 - 2 Shots of liquor per week     Comment: none since IGG    Drug use: No     Comment: medical canabis tincture and vape           2023   LAST FHS-7 RESULTS   1st degree relative breast or ovarian cancer No   Any relative bilateral breast cancer No   Any male have breast cancer No   Any ONE woman have BOTH breast AND ovarian cancer No   Any woman with breast cancer before 50yrs No   2 or more relatives with breast AND/OR ovarian cancer No                3/19/2024   STI Screening   New sexual partner(s) since last STI/HIV test? No      ASCVD Risk   The 10-year ASCVD risk score (Mariangel NAILS, et al., 2019) is: 2.6%    Values used to calculate the score:      Age: 58 years      Sex: Female      Is Non- : No      Diabetic: Yes      Tobacco  smoker: No      Systolic Blood Pressure: 105 mmHg      Is BP treated: No      HDL Cholesterol: 59 mg/dL      Total Cholesterol: 170 mg/dL

## 2024-03-20 NOTE — NURSING NOTE
Patient confirms medications and allergies are accurate via patients echeck in completion, and or denies any changes since last reviewed/verified.     Is the patient currently in the state of [place]? NO    Visit mode:VIDEO    If the visit is dropped, the patient can be reconnected by: VIDEO VISIT: Text to cell phone:   Telephone Information:   Mobile [phone]       Will anyone else be joining the visit? NO  (If patient encounters technical issues they should call [phone])    How would you like to obtain your AVS? MyChart    Are changes needed to the allergy or medication list? No    Reason for visit: RECHECK    Catarina FLETCHER       normal for race

## 2024-03-20 NOTE — LETTER
My Depression Action Plan  Name: Dinora Mcghee   Date of Birth 1965  Date: 3/20/2024    My doctor: Juan Jose Gomez   My clinic: [facility] [address] [id number] [phone]            GREEN    ZONE   Good Control    What it looks like:   Things are going generally well. You have normal ups and downs. You may even feel depressed from time to time, but bad moods usually last less than a day.   What you need to do:  Continue to care for yourself (see self care plan)  Check your depression survival kit and update it as needed  Follow your physician s recommendations including any medication.  Do not stop taking medication unless you consult with your physician first.             YELLOW         ZONE Getting Worse    What it looks like:   Depression is starting to interfere with your life.   It may be hard to get out of bed; you may be starting to isolate yourself from others.  Symptoms of depression are starting to last most all day and this has happened for several days.   You may have suicidal thoughts but they are not constant.   What you need to do:     Call your care team. Your response to treatment will improve if you keep your care team informed of your progress. Yellow periods are signs an adjustment may need to be made.     Continue your self-care.  Just get dressed and ready for the day.  Don't give yourself time to talk yourself out of it.    Talk to someone in your support network.    Open up your Depression Self-Care Plan/Wellness Kit.             RED    ZONE Medical Alert - Get Help    What it looks like:   Depression is seriously interfering with your life.   You may experience these or other symptoms: You can t get out of bed most days, can t work or engage in other necessary activities, you have trouble taking care of basic hygiene, or basic responsibilities, thoughts of suicide or death that will not go  away, self-injurious behavior.     What you need to do:  Call your care team and request a same-day appointment. If they are not available (weekends or after hours) call your local crisis line, emergency room or 911.          Depression Self-Care Plan / Wellness Kit    Many people find that medication and therapy are helpful treatments for managing depression. In addition, making small changes to your everyday life can help to boost your mood and improve your wellbeing. Below are some tips for you to consider. Be sure to talk with your medical provider and/or behavioral health consultant if your symptoms are worsening or not improving.     Sleep   Sleep hygiene  means all of the habits that support good, restful sleep. It includes maintaining a consistent bedtime and wake time, using your bedroom only for sleeping or sex, and keeping the bedroom dark and free of distractions like a computer, smartphone, or television.     Develop a Healthy Routine  Maintain good hygiene. Get out of bed in the morning, make your bed, brush your teeth, take a shower, and get dressed. Don t spend too much time viewing media that makes you feel stressed. Find time to relax each day.    Exercise  Get some form of exercise every day. This will help reduce pain and release endorphins, the  feel good  chemicals in your brain. It can be as simple as just going for a walk or doing some gardening, anything that will get you moving.      Diet  Strive to eat healthy foods, including fruits and vegetables. Drink plenty of water. Avoid excessive sugar, caffeine, alcohol, and other mood-altering substances.     Stay Connected with Others  Stay in touch with friends and family members.    Manage Your Mood  Try deep breathing, massage therapy, biofeedback, or meditation. Take part in fun activities when you can. Try to find something to smile about each day.     Psychotherapy  Be open to working with a therapist if your provider recommends it.      Medication  Be sure to take your medication as prescribed. Most anti-depressants need to be taken every day. It usually takes several weeks for medications to work. Not all medicines work for all people. It is important to follow-up with your provider to make sure you have a treatment plan that is working for you. Do not stop your medication abruptly without first discussing it with your provider.    Crisis Resources   These hotlines are for both adults and children. They and are open 24 hours a day, 7 days a week unless noted otherwise.    National Suicide Prevention Lifeline   988 or 7-210-908-HPFS (1106)    Crisis Text Line    www.crisistextline.org  Text HOME to 520032 from anywhere in the [place], anytime, about any type of crisis. A live, trained crisis counselor will receive the text and respond quickly.    [employer] Lifeline for LGBTQ Youth  A national crisis intervention and suicide lifeline for LGBTQ youth under 25. Provides a safe place to talk without judgement. Call 1-147.729.8415; text START to 438816 or visit www.thetrevorproject.org to talk to a trained counselor.    For [facility] crisis numbers, visit the [facility] website at:  https://[place].gov/dhs/people-we-serve/adults/health-care/mental-health/resources/crisis-contacts.jsp

## 2024-03-20 NOTE — PATIENT INSTRUCTIONS
Preventive Care Advice   This is general advice given by our system to help you stay healthy. However, your care team may have specific advice just for you. Please talk to your care team about your preventive care needs.  Nutrition  Eat 5 or more servings of fruits and vegetables each day.  Try wheat bread, brown rice and whole grain pasta (instead of white bread, rice, and pasta).  Get enough calcium and vitamin D. Check the label on foods and aim for 100% of the RDA (recommended daily allowance).  Lifestyle  Exercise at least 150 minutes each week   (30 minutes a day, 5 days a week).  Do muscle strengthening activities 2 days a week. These help control your weight and prevent disease.  No smoking.  Wear sunscreen to prevent skin cancer.  Have a dental exam and cleaning every 6 months.  Yearly exams  See your health care team every year to talk about:  Any changes in your health.  Any medicines your care team has prescribed.  Preventive care, family planning, and ways to prevent chronic diseases.  Shots (vaccines)   HPV shots (up to age 26), if you've never had them before.  Hepatitis B shots (up to age 59), if you've never had them before.  COVID-19 shot: Get this shot when it's due.  Flu shot: Get a flu shot every year.  Tetanus shot: Get a tetanus shot every 10 years.  Pneumococcal, hepatitis A, and RSV shots: Ask your care team if you need these based on your risk.  Shingles shot (for age 50 and up).  General health tests  Diabetes screening:  Starting at age 35, Get screened for diabetes at least every 3 years.  If you are younger than age 35, ask your care team if you should be screened for diabetes.  Cholesterol test: At age 39, start having a cholesterol test every 5 years, or more often if advised.  Bone density scan (DEXA): At age 50, ask your care team if you should have this scan for osteoporosis (brittle bones).  Hepatitis C: Get tested at least once in your life.  STIs (sexually transmitted  infections)  Before age 24: Ask your care team if you should be screened for STIs.  After age 24: Get screened for STIs if you're at risk. You are at risk for STIs (including HIV) if:  You are sexually active with more than one person.  You don't use condoms every time.  You or a partner was diagnosed with a sexually transmitted infection.  If you are at risk for HIV, ask about PrEP medicine to prevent HIV.  Get tested for HIV at least once in your life, whether you are at risk for HIV or not.  Cancer screening tests  Cervical cancer screening: If you have a cervix, begin getting regular cervical cancer screening tests at age 21. Most people who have regular screenings with normal results can stop after age 65. Talk about this with your provider.  Breast cancer scan (mammogram): If you've ever had breasts, begin having regular mammograms starting at age 40. This is a scan to check for breast cancer.  Colon cancer screening: It is important to start screening for colon cancer at age 45.  Have a colonoscopy test every 10 years (or more often if you're at risk) Or, ask your provider about stool tests like a FIT test every year or Cologuard test every 3 years.  To learn more about your testing options, visit: https://www.panOpen/581099.pdf.  For help making a decision, visit: https://bit.ly/ph29029.  Prostate cancer screening test: If you have a prostate and are age 55 to 69, ask your provider if you would benefit from a yearly prostate cancer screening test.  Lung cancer screening: If you are a current or former smoker age 50 to 80, ask your care team if ongoing lung cancer screenings are right for you.  For informational purposes only. Not to replace the advice of your health care provider. Copyright   2023 [employer]. All rights reserved. Clinically reviewed by the [facility]. [employer]andtalent [id number] - REV 01/24.    Learning About Stress  What is stress?     Stress is your  body's response to a hard situation. Your body can have a physical, emotional, or mental response. Stress is a fact of life for most people, and it affects everyone differently. What causes stress for you may not be stressful for someone else.  A lot of things can cause stress. You may feel stress when you go on a job interview, take a test, or run a race. This kind of short-term stress is normal and even useful. It can help you if you need to work hard or react quickly. For example, stress can help you finish an important job on time.  Long-term stress is caused by ongoing stressful situations or events. Examples of long-term stress include long-term health problems, ongoing problems at work, or conflicts in your family. Long-term stress can harm your health.  How does stress affect your health?  When you are stressed, your body responds as though you are in danger. It makes hormones that speed up your heart, make you breathe faster, and give you a burst of energy. This is called the fight-or-flight stress response. If the stress is over quickly, your body goes back to normal and no harm is done.  But if stress happens too often or lasts too long, it can have bad effects. Long-term stress can make you more likely to get sick, and it can make symptoms of some diseases worse. If you tense up when you are stressed, you may develop neck, shoulder, or low back pain. Stress is linked to high blood pressure and heart disease.  Stress also harms your emotional health. It can make you snell, tense, or depressed. Your relationships may suffer, and you may not do well at work or school.  What can you do to manage stress?  You can try these things to help manage stress:   Do something active. Exercise or activity can help reduce stress. Walking is a great way to get started. Even everyday activities such as housecleaning or yard work can help.  Try yoga or bry chi. These techniques combine exercise and meditation. You may need  some training at first to learn them.  Do something you enjoy. For example, listen to music or go to a movie. Practice your hobby or do volunteer work.  Meditate. This can help you relax, because you are not worrying about what happened before or what may happen in the future.  Do guided imagery. Imagine yourself in any setting that helps you feel calm. You can use online videos, books, or a teacher to guide you.  Do breathing exercises. For example:  From a standing position, bend forward from the waist with your knees slightly bent. Let your arms dangle close to the floor.  Breathe in slowly and deeply as you return to a standing position. Roll up slowly and lift your head last.  Hold your breath for just a few seconds in the standing position.  Breathe out slowly and bend forward from the waist.  Let your feelings out. Talk, laugh, cry, and express anger when you need to. Talking with supportive friends or family, a counselor, or a tabitha leader about your feelings is a healthy way to relieve stress. Avoid discussing your feelings with people who make you feel worse.  Write. It may help to write about things that are bothering you. This helps you find out how much stress you feel and what is causing it. When you know this, you can find better ways to cope.  What can you do to prevent stress?  You might try some of these things to help prevent stress:  Manage your time. This helps you find time to do the things you want and need to do.  Get enough sleep. Your body recovers from the stresses of the day while you are sleeping.  Get support. Your family, friends, and community can make a difference in how you experience stress.  Limit your news feed. Avoid or limit time on social media or news that may make you feel stressed.  Do something active. Exercise or activity can help reduce stress. Walking is a great way to get started.  Where can you learn more?  Go to https://www.healthwise.net/patiented  Enter N032 in the  "search box to learn more about \"Learning About Stress.\"  Current as of: October 24, 2023               Content Version: 14.0    5028-4457 [employer].   Care instructions adapted under license by your healthcare professional. If you have questions about a medical condition or this instruction, always ask your healthcare professional. [employer] disclaims any warranty or liability for your use of this information.      Learning About Depression Screening  What is depression screening?  Depression screening is a way to see if you have depression symptoms. It may be done by a doctor or counselor. It's often part of a routine checkup. That's because your mental health is just as important as your physical health.  Depression is a mental health condition that affects how you feel, think, and act. You may:  Have less energy.  Lose interest in your daily activities.  Feel sad and grouchy for a long time.  Depression is very common. It affects people of all ages.  Many things can lead to depression. Some people become depressed after they have a stroke or find out they have a major illness like cancer or heart disease. The death of a loved one or a breakup may lead to depression. It can run in families. Most experts believe that a combination of inherited genes and stressful life events can cause it.  What happens during screening?  You may be asked to fill out a form about your depression symptoms. You and the doctor will discuss your answers. The doctor may ask you more questions to learn more about how you think, act, and feel.  What happens after screening?  If you have symptoms of depression, your doctor will talk to you about your options.  Doctors usually treat depression with medicines or counseling. Often, combining the two works best. Many people don't get help because they think that they'll get over the depression on their own. But people with depression may not get better unless they " "get treatment.  The cause of depression is not well understood. There may be many factors involved. But if you have depression, it's not your fault.  A serious symptom of depression is thinking about death or suicide. If you or someone you care about talks about this or about feeling hopeless, get help right away.  It's important to know that depression can be treated. Medicine, counseling, and self-care may help.  Where can you learn more?  Go to https://www.[employer].net/patiented  Enter T185 in the search box to learn more about \"Learning About Depression Screening.\"  Current as of: June 24, 2023               Content Version: 14.0    5582-5581 [employer].   Care instructions adapted under license by your healthcare professional. If you have questions about a medical condition or this instruction, always ask your healthcare professional. [employer] disclaims any warranty or liability for your use of this information.      Preparing for Your Surgery  Getting started  A nurse will call you to review your health history and instructions. They will give you an arrival time based on your scheduled surgery time. Please be ready to share:  Your doctor's clinic name and phone number  Your medical, surgical, and anesthesia history  A list of allergies and sensitivities  A list of medicines, including herbal treatments and over-the-counter drugs  Whether the patient has a legal guardian (ask how to send us the papers in advance)  Please tell us if you're pregnant--or if there's any chance you might be pregnant. Some surgeries may injure a fetus (unborn baby), so they require a pregnancy test. Surgeries that are safe for a fetus don't always need a test, and you can choose whether to have one.   If you have a child who's having surgery, please ask for a copy of Preparing for Your Child's Surgery.    Preparing for surgery  Within 10 to 30 days of surgery: Have a pre-op exam (sometimes called an " H&P, or History and Physical). This can be done at a clinic or pre-operative center.  If you're having a , you may not need this exam. Talk to your care team.  At your pre-op exam, talk to your care team about all medicines you take. If you need to stop any medicines before surgery, ask when to start taking them again.  We do this for your safety. Many medicines can make you bleed too much during surgery. Some change how well surgery (anesthesia) drugs work.  Call your insurance company to let them know you're having surgery. (If you don't have insurance, call [phone].)  Call your clinic if there's any change in your health. This includes signs of a cold or flu (sore throat, runny nose, cough, rash, fever). It also includes a scrape or scratch near the surgery site.  If you have questions on the day of surgery, call your hospital or surgery center.  Eating and drinking guidelines  For your safety: Unless your surgeon tells you otherwise, follow the guidelines below.  Eat and drink as usual until 8 hours before you arrive for surgery. After that, no food or milk.  Drink clear liquids until 2 hours before you arrive. These are liquids you can see through, like water, Gatorade, and Propel Water. They also include plain black coffee and tea (no cream or milk), candy, and breath mints. You can spit out gum when you arrive.  If you drink alcohol: Stop drinking it the night before surgery.  If your care team tells you to take medicine on the morning of surgery, it's okay to take it with a sip of water.  Preventing infection  Shower or bathe the night before and morning of your surgery. Follow the instructions your clinic gave you. (If no instructions, use regular soap.)  Don't shave or clip hair near your surgery site. We'll remove the hair if needed.  Don't smoke or vape the morning of surgery. You may chew nicotine gum up to 2 hours before surgery. A nicotine patch is okay.  Note: Some surgeries require you  to completely quit smoking and nicotine. Check with your surgeon.  Your care team will make every effort to keep you safe from infection. We will:  Clean our hands often with soap and water (or an alcohol-based hand rub).  Clean the skin at your surgery site with a special soap that kills germs.  Give you a special gown to keep you warm. (Cold raises the risk of infection.)  Wear special hair covers, masks, gowns and gloves during surgery.  Give antibiotic medicine, if prescribed. Not all surgeries need antibiotics.  What to bring on the day of surgery  Photo ID and insurance card  Copy of your health care directive, if you have one  Glasses and hearing aids (bring cases)  You can't wear contacts during surgery  Inhaler and eye drops, if you use them (tell us about these when you arrive)  CPAP machine or breathing device, if you use them  A few personal items, if spending the night  If you have . . .  A pacemaker, ICD (cardiac defibrillator) or other implant: Bring the ID card.  An implanted stimulator: Bring the remote control.  A legal guardian: Bring a copy of the certified (court-stamped) guardianship papers.  Please remove any jewelry, including body piercings. Leave jewelry and other valuables at home.  If you're going home the day of surgery  You must have a responsible adult drive you home. They should stay with you overnight as well.  If you don't have someone to stay with you, and you aren't safe to go home alone, we may keep you overnight. Insurance often won't pay for this.  After surgery  If it's hard to control your pain or you need more pain medicine, please call your surgeon's office.  Questions?   If you have any questions for your care team, list them here: _________________________________________________________________________________________________________________________________________________________________________ ____________________________________ ____________________________________  ____________________________________  For informational purposes only. Not to replace the advice of your health care provider. Copyright   2003, 2019 [facility]. All rights reserved. Clinically reviewed by Tegan Khoury MD. [employer] [id number] - REV 12/22.    How to Take Your Medication Before Surgery  - Hold the insulin if the tube feeds are held before the procedure  - Talk to the pain clinic about post-procedure pain control plan

## 2024-03-21 LAB
CREAT UR-MCNC: 37.7 MG/DL
MICROALBUMIN UR-MCNC: <12 MG/L
MICROALBUMIN/CREAT UR: NORMAL MG/G{CREAT}

## 2024-03-22 ENCOUNTER — TRANSFERRED RECORDS (OUTPATIENT)
Dept: HEALTH INFORMATION MANAGEMENT | Facility: CLINIC | Age: 59
End: 2024-03-22
Payer: COMMERCIAL

## 2024-03-25 ENCOUNTER — HOSPITAL ENCOUNTER (OUTPATIENT)
Dept: GENERAL RADIOLOGY | Facility: CLINIC | Age: 59
Discharge: HOME | End: 2024-03-25
Attending: INTERNAL MEDICINE | Admitting: INTERNAL MEDICINE
Payer: COMMERCIAL

## 2024-03-25 DIAGNOSIS — K59.01 SLOW TRANSIT CONSTIPATION: ICD-10-CM

## 2024-03-25 PROCEDURE — 74283 THER NMA RDCTJ INTUS/OBSTRCJ: CPT

## 2024-03-25 RX ADMIN — DIATRIZOATE MEGLUMINE AND DIATRIZOATE SODIUM 480 ML: 660; 100 SOLUTION ORAL; RECTAL at 10:41

## 2024-03-26 ENCOUNTER — MYC MEDICAL ADVICE (OUTPATIENT)
Dept: GASTROENTEROLOGY | Facility: CLINIC | Age: 59
End: 2024-03-26
Payer: COMMERCIAL

## 2024-03-26 NOTE — TELEPHONE ENCOUNTER
"Contacted patient to discuss symptoms-     Patient states since the gastrographin enema, she has been consistently vomiting. Reports she vomited during the test, immediately afterwards, on the way home from the procedure and all throughout the night.     States she had dry heaves throughout the morning, but has now be able to take a couple sips of water.     Has tube feed infusing; is considering IVF infusion. Reports she has the supplies on hand for it.    She reports taking IV promethazine and rectal compazine.     States she has abdominal pain and constant \"gurgling.\" States her stomach sounds \"like a drain.\"    She is currently resting and trying to sleep, as she has a migraine.     Took muscle relaxant last night and this morning, which has been somewhat helpful.    Denies a fever, but states she feels hot.     Reports having a large BM on Sunday with the enema.     Since the procedure has had approximately 6 loose bowel movements.     Patient is requesting any recommendations. Is considering the ED, however does not think she can handle it due to her migraine.   "

## 2024-03-26 NOTE — TELEPHONE ENCOUNTER
Discussed with Dr. Genao.    Advised patient to continue with current interventions-   Continue tube feedings  Start free water flushes in between feeds  Start IVF once they are delivered  Continue utilizing antiemetics and sipping water  We anticipate diarrhea     Offered to send stool studies to local clinic, however patient declined at this time.     [employer] message sent with direct callback number.

## 2024-03-29 ENCOUNTER — DOCUMENTATION ONLY (OUTPATIENT)
Dept: INFECTIOUS DISEASES | Facility: CLINIC | Age: 59
End: 2024-03-29
Payer: COMMERCIAL

## 2024-04-03 DIAGNOSIS — E10.649 TYPE 1 DIABETES MELLITUS WITH HYPOGLYCEMIA AND WITHOUT COMA (H): ICD-10-CM

## 2024-04-04 RX ORDER — BLOOD-GLUCOSE SENSOR
EACH MISCELLANEOUS
Qty: 2 EACH | Refills: 11 | Status: SHIPPED | OUTPATIENT
Start: 2024-04-04

## 2024-04-08 ENCOUNTER — VIRTUAL VISIT (OUTPATIENT)
Dept: GASTROENTEROLOGY | Facility: CLINIC | Age: 59
End: 2024-04-08
Attending: INTERNAL MEDICINE
Payer: COMMERCIAL

## 2024-04-08 DIAGNOSIS — K59.00 CONSTIPATION, UNSPECIFIED CONSTIPATION TYPE: ICD-10-CM

## 2024-04-08 DIAGNOSIS — Z93.4 SMALL BOWEL TUBE FEEDING (H): Primary | ICD-10-CM

## 2024-04-08 DIAGNOSIS — K31.84 GASTROPARESIS: ICD-10-CM

## 2024-04-08 NOTE — Clinical Note
4/8/2024         RE: Dinora Mcghee  [address] [facility] [address]        Dear Colleague,    Thank you for referring your patient, Dinora Mcghee, to the [facility]. Please see a copy of my visit note below.    Medication Therapy Management (MTM) Encounter    ASSESSMENT:                            Medication Adherence/Access: {adherencechoices:799271}    ***:  ***      PLAN:                            Hattie to follow-up on med access  Duloxetine capsules    Follow-up:       SUBJECTIVE/OBJECTIVE:                          Dinora Mcghee is a 59 year old female { :741940} for {mtmvisit:496154}     Reason for visit: ***.    Allergies/ADRs: {1/2/3/4/5:613020}  Past Medical History: {1/2/3/4/5:295301}  Tobacco: She reports that she quit smoking about 32 years ago. Her smoking use included cigarettes. She started smoking about 38 years ago. She has a 3 pack-year smoking history. She does not have any smokeless tobacco history on file.  Alcohol: {ALCOHOL CONSUMPTION HX:282474}  {Social and Goals:894805}    Medication Adherence/Access: {fumedadherence:740444}    ***:       Notes that she tried to respond to the requested changes that we made. Got taken off thiamine. Notes the compazine suppositories seem to be helpful, unless she has diarrhea. This is happening about once a week where she has both issues. Notes she is only on tube feed right now, not on TPN. She notes that her bowels have started a full on revolt. A year ago she states she would go     -- amitriptyline rejected due to cost  -- montelukast  -- levothyroxine   -- baclofen  -- compazine (has suppositories)    :    Started taking duloxetine last Wednesday -- notes that she has felt tired/worn out since coming back from [place] last week and starting the duloxetine. She feels very tired, more than normal.    ----------------  {CHARLIE?:916861}    I spent 24 minutes with this patient today. All changes were made via  "collaborative practice agreement with Vijaya Genao. A copy of the visit note was provided to the patient's provider(s).    A summary of these recommendations {GIVEN/NOT GIVEN:110697}.    Hattie Lazo PharmD, BCACP  MTM Pharmacist   [facility]   Phone: [phone]    Telemedicine Visit Details  Type of service:  {telemedvisitmtm:582511::\"Telephone visit\"}  Start Time: {video/phone visit start time:566294}  End Time: 2:55 PM     Medication Therapy Recommendations  No medication therapy recommendations to display         Again, thank you for allowing me to participate in the care of your patient.        Sincerely,        Hattie Lazo CASH  "

## 2024-04-08 NOTE — PROGRESS NOTES
Medication Therapy Management (MTM) Encounter    ASSESSMENT:                            Medication Adherence/Access: See below for considerations    Gastroparesis/Constipation/Small Bowel Tube Feeding/GERD:     Unable to read the entirety of the insurance communication during our visit today, however, upon review it looks like they have made suggestions for a few of her therapies.     -- amitriptyline (compound) rejected due to cost : per letter we could still consider a compound, but they would suggest trying to do this with crushed tablets rather than bulk compounding powder.    -- montelukast (chewable) : can pursue this, likely through standard PA     -- levothyroxine (no change) : could consider looking into pharm / versus medical benefit for injectable    -- baclofen (consider liquid) : can pursue coverage review    -- compazine (has suppositories) : no change for now    -- thiamine (sublingual -- but no longer taking) : improved     Mood: We discussed that she is likely in the part of medication initiation that has the highest rate of side effects, which are typically GI related in nature. If she is able to manage, discussed considering trying to continue this for a bit of time, as side effects should worsen after the two week [place]. It is possible the gut dysregulation she has been experiencing is from this medication and could get better with time. If not, she may benefit from discussing alternate therapy. There are multiple sources noted duloxetine is not suitable for tube administration. She does not tolerate applesauce, but could discuss administration with apple juice.    PLAN:                            Hattie to follow-up on med access discussion and will re-visit discussion at follow-up.    Hattie to review duloxetine capsule alternate administration and will discuss at follow-up.    Follow-up: 4/18 for telephone call    SUBJECTIVE/OBJECTIVE:                          Dinora Mcghee is a 59 year old  female called for a follow-up visit.       Reason for visit: medication questions    Allergies/ADRs: Reviewed in chart  Tobacco: She reports that she quit smoking about 32 years ago. Her smoking use included cigarettes. She started smoking about 38 years ago. She has a 3.2 pack-year smoking history. She does not have any smokeless tobacco history on file.  Alcohol: not currently using    Medication Adherence/Access:   -- sent a [employer] today with insurance response on medication asks    Gastroparesis/Constipation/Small Bowel Tube Feeding/GERD:     Notes that she tried to respond to the requested changes that we made. Got taken off thiamine. Notes the compazine suppositories seem to be helpful, unless she has diarrhea. This is happening about once a week where she has both issues (nausea // diarrhea). Notes she is only on tube feed right now, not on TPN. She notes that her bowels have started a full on revolt.     Medications discussed previously:  -- amitriptyline (compound) rejected due to cost  -- montelukast (chewable)  -- levothyroxine (no change)  -- baclofen (consider liquid)  -- compazine (has suppositories)  -- thiamine (sublingual -- but no longer taking)    Mood:  Duloxetine 30 mg daily     Started taking duloxetine last Wednesday -- notes that she has felt tired/worn out since coming back from [place] last week and was starting the duloxetine. She feels very tired, more than normal. She is not sure if her bowel response is from this, or what is going on, but she does not feel that she is doing well. She notes that this particular medication was selected because of it's overlapping indication of helping with chronic pain. She also notes that capsules are hard to administer for her.    ----------------  Post Discharge Medication Reconciliation Status: discharge medications reconciled and changed, per note/orders.    I spent 24 minutes with this patient today. All changes were made via collaborative  practice agreement with Vijaya Genao. A copy of the visit note was provided to the patient's provider(s).    A summary of these recommendations was sent via [employer].    Hattie VanceD, BCACP  MTM Pharmacist   [facility]   Phone: [phone]    Telemedicine Visit Details  Type of service:  Telephone visit  Start Time:  2:31 PM  End Time: 2:55 PM     Medication Therapy Recommendations  Small bowel tube feeding (H)    Rationale: Dosage form inappropriate - Ineffective medication - Effectiveness   Recommendation: Change Medication   Status: Resolved Med Access Issue

## 2024-04-12 ENCOUNTER — TRANSFERRED RECORDS (OUTPATIENT)
Dept: MULTI SPECIALTY CLINIC | Facility: CLINIC | Age: 59
End: 2024-04-12
Payer: COMMERCIAL

## 2024-04-12 LAB — RETINOPATHY: NORMAL

## 2024-04-15 ENCOUNTER — APPOINTMENT (OUTPATIENT)
Dept: INTERVENTIONAL RADIOLOGY/VASCULAR | Facility: CLINIC | Age: 59
End: 2024-04-15
Attending: PHYSICIAN ASSISTANT
Payer: COMMERCIAL

## 2024-04-15 ENCOUNTER — APPOINTMENT (OUTPATIENT)
Dept: MEDSURG UNIT | Facility: CLINIC | Age: 59
End: 2024-04-15
Attending: INTERNAL MEDICINE
Payer: COMMERCIAL

## 2024-04-15 ENCOUNTER — HOSPITAL ENCOUNTER (OUTPATIENT)
Facility: CLINIC | Age: 59
Discharge: HOME | End: 2024-04-15
Attending: INTERNAL MEDICINE | Admitting: PHYSICIAN ASSISTANT
Payer: COMMERCIAL

## 2024-04-15 ENCOUNTER — PATIENT OUTREACH (OUTPATIENT)
Dept: GASTROENTEROLOGY | Facility: CLINIC | Age: 59
End: 2024-04-15

## 2024-04-15 ENCOUNTER — PREP FOR PROCEDURE (OUTPATIENT)
Dept: GASTROENTEROLOGY | Facility: CLINIC | Age: 59
End: 2024-04-15

## 2024-04-15 VITALS
TEMPERATURE: 97.8 F | SYSTOLIC BLOOD PRESSURE: 114 MMHG | DIASTOLIC BLOOD PRESSURE: 71 MMHG | HEART RATE: 62 BPM | OXYGEN SATURATION: 97 % | RESPIRATION RATE: 16 BRPM

## 2024-04-15 DIAGNOSIS — K31.84 GASTROPARESIS: ICD-10-CM

## 2024-04-15 DIAGNOSIS — Z46.59 ENCOUNTER FOR CARE RELATED TO FEEDING TUBE: Primary | ICD-10-CM

## 2024-04-15 DIAGNOSIS — K31.84 GASTROPARESIS: Primary | ICD-10-CM

## 2024-04-15 LAB
FASTING STATUS PATIENT QL REPORTED: YES
GLUCOSE BLDC GLUCOMTR-MCNC: 74 MG/DL (ref 70–99)
GLUCOSE SERPL-MCNC: 146 MG/DL (ref 70–99)
INR PPP: 1.21 (ref 0.85–1.15)

## 2024-04-15 PROCEDURE — 250N000013 HC RX MED GY IP 250 OP 250 PS 637: Performed by: PHYSICIAN ASSISTANT

## 2024-04-15 PROCEDURE — 250N000011 HC RX IP 250 OP 636: Performed by: NURSE PRACTITIONER

## 2024-04-15 PROCEDURE — 258N000001 HC RX 258: Performed by: PHYSICIAN ASSISTANT

## 2024-04-15 PROCEDURE — 82962 GLUCOSE BLOOD TEST: CPT

## 2024-04-15 PROCEDURE — 99152 MOD SED SAME PHYS/QHP 5/>YRS: CPT

## 2024-04-15 PROCEDURE — 36558 INSERT TUNNELED CV CATH: CPT | Performed by: PHYSICIAN ASSISTANT

## 2024-04-15 PROCEDURE — 82947 ASSAY GLUCOSE BLOOD QUANT: CPT | Mod: XU | Performed by: NURSE PRACTITIONER

## 2024-04-15 PROCEDURE — 85610 PROTHROMBIN TIME: CPT | Performed by: NURSE PRACTITIONER

## 2024-04-15 PROCEDURE — 272N000504 HC NEEDLE CR4

## 2024-04-15 PROCEDURE — 272N000192 HC ACCESSORY CR2

## 2024-04-15 PROCEDURE — 36591 DRAW BLOOD OFF VENOUS DEVICE: CPT | Performed by: NURSE PRACTITIONER

## 2024-04-15 PROCEDURE — 99152 MOD SED SAME PHYS/QHP 5/>YRS: CPT | Performed by: PHYSICIAN ASSISTANT

## 2024-04-15 PROCEDURE — 77001 FLUOROGUIDE FOR VEIN DEVICE: CPT | Mod: 26 | Performed by: PHYSICIAN ASSISTANT

## 2024-04-15 PROCEDURE — C1769 GUIDE WIRE: HCPCS

## 2024-04-15 PROCEDURE — 250N000009 HC RX 250: Performed by: PHYSICIAN ASSISTANT

## 2024-04-15 PROCEDURE — 258N000003 HC RX IP 258 OP 636: Performed by: NURSE PRACTITIONER

## 2024-04-15 PROCEDURE — 99153 MOD SED SAME PHYS/QHP EA: CPT

## 2024-04-15 PROCEDURE — C1751 CATH, INF, PER/CENT/MIDLINE: HCPCS

## 2024-04-15 PROCEDURE — 76937 US GUIDE VASCULAR ACCESS: CPT | Mod: 26 | Performed by: PHYSICIAN ASSISTANT

## 2024-04-15 PROCEDURE — 999N000132 HC STATISTIC PP CARE STAGE 1

## 2024-04-15 PROCEDURE — 250N000011 HC RX IP 250 OP 636: Performed by: PHYSICIAN ASSISTANT

## 2024-04-15 RX ORDER — NALOXONE HYDROCHLORIDE 0.4 MG/ML
0.2 INJECTION, SOLUTION INTRAMUSCULAR; INTRAVENOUS; SUBCUTANEOUS
Status: DISCONTINUED | OUTPATIENT
Start: 2024-04-15 | End: 2024-04-15 | Stop reason: HOSPADM

## 2024-04-15 RX ORDER — DEXTROSE MONOHYDRATE 25 G/50ML
25-50 INJECTION, SOLUTION INTRAVENOUS
Status: DISCONTINUED | OUTPATIENT
Start: 2024-04-15 | End: 2024-04-15 | Stop reason: HOSPADM

## 2024-04-15 RX ORDER — NICOTINE POLACRILEX 4 MG
15-30 LOZENGE BUCCAL
Status: DISCONTINUED | OUTPATIENT
Start: 2024-04-15 | End: 2024-04-15 | Stop reason: HOSPADM

## 2024-04-15 RX ORDER — HEPARIN SODIUM,PORCINE 10 UNIT/ML
5-20 VIAL (ML) INTRAVENOUS EVERY 24 HOURS
Status: DISCONTINUED | OUTPATIENT
Start: 2024-04-15 | End: 2024-04-15 | Stop reason: HOSPADM

## 2024-04-15 RX ORDER — HEPARIN SODIUM (PORCINE) LOCK FLUSH IV SOLN 100 UNIT/ML 100 UNIT/ML
3 SOLUTION INTRAVENOUS EVERY 24 HOURS
Status: DISCONTINUED | OUTPATIENT
Start: 2024-04-15 | End: 2024-04-15 | Stop reason: HOSPADM

## 2024-04-15 RX ORDER — HEPARIN SODIUM,PORCINE 10 UNIT/ML
5-20 VIAL (ML) INTRAVENOUS
Status: DISCONTINUED | OUTPATIENT
Start: 2024-04-15 | End: 2024-04-15 | Stop reason: HOSPADM

## 2024-04-15 RX ORDER — SODIUM CHLORIDE 9 MG/ML
INJECTION, SOLUTION INTRAVENOUS CONTINUOUS
Status: DISCONTINUED | OUTPATIENT
Start: 2024-04-15 | End: 2024-04-15 | Stop reason: HOSPADM

## 2024-04-15 RX ORDER — LIDOCAINE 40 MG/G
CREAM TOPICAL
Status: DISCONTINUED | OUTPATIENT
Start: 2024-04-15 | End: 2024-04-15 | Stop reason: HOSPADM

## 2024-04-15 RX ORDER — FLUMAZENIL 0.1 MG/ML
0.2 INJECTION, SOLUTION INTRAVENOUS
Status: DISCONTINUED | OUTPATIENT
Start: 2024-04-15 | End: 2024-04-15 | Stop reason: HOSPADM

## 2024-04-15 RX ORDER — ACETAMINOPHEN 325 MG/10.15ML
1000 LIQUID ORAL EVERY 6 HOURS PRN
Status: DISCONTINUED | OUTPATIENT
Start: 2024-04-15 | End: 2024-04-15 | Stop reason: HOSPADM

## 2024-04-15 RX ORDER — HEPARIN SODIUM (PORCINE) LOCK FLUSH IV SOLN 100 UNIT/ML 100 UNIT/ML
3 SOLUTION INTRAVENOUS
Status: DISCONTINUED | OUTPATIENT
Start: 2024-04-15 | End: 2024-04-15 | Stop reason: HOSPADM

## 2024-04-15 RX ORDER — FENTANYL CITRATE 50 UG/ML
25-50 INJECTION, SOLUTION INTRAMUSCULAR; INTRAVENOUS EVERY 5 MIN PRN
Status: DISCONTINUED | OUTPATIENT
Start: 2024-04-15 | End: 2024-04-15 | Stop reason: HOSPADM

## 2024-04-15 RX ORDER — NALOXONE HYDROCHLORIDE 0.4 MG/ML
0.4 INJECTION, SOLUTION INTRAMUSCULAR; INTRAVENOUS; SUBCUTANEOUS
Status: DISCONTINUED | OUTPATIENT
Start: 2024-04-15 | End: 2024-04-15 | Stop reason: HOSPADM

## 2024-04-15 RX ORDER — CEFAZOLIN SODIUM 2 G/100ML
2 INJECTION, SOLUTION INTRAVENOUS
Status: COMPLETED | OUTPATIENT
Start: 2024-04-15 | End: 2024-04-15

## 2024-04-15 RX ADMIN — CEFAZOLIN SODIUM 2 G: 2 INJECTION, SOLUTION INTRAVENOUS at 09:06

## 2024-04-15 RX ADMIN — ACETAMINOPHEN 975 MG: 325 SOLUTION ORAL at 12:39

## 2024-04-15 RX ADMIN — Medication 2.5 ML: at 10:31

## 2024-04-15 RX ADMIN — MIDAZOLAM 1 MG: 1 INJECTION INTRAMUSCULAR; INTRAVENOUS at 10:17

## 2024-04-15 RX ADMIN — SODIUM CHLORIDE: 9 INJECTION, SOLUTION INTRAVENOUS at 09:06

## 2024-04-15 RX ADMIN — LIDOCAINE HYDROCHLORIDE 5 ML: 10 INJECTION, SOLUTION EPIDURAL; INFILTRATION; INTRACAUDAL; PERINEURAL at 10:18

## 2024-04-15 RX ADMIN — Medication 2.5 ML: at 10:32

## 2024-04-15 RX ADMIN — FENTANYL CITRATE 50 MCG: 50 INJECTION, SOLUTION INTRAMUSCULAR; INTRAVENOUS at 10:17

## 2024-04-15 RX ADMIN — DEXTROSE MONOHYDRATE 25 ML: 25 INJECTION, SOLUTION INTRAVENOUS at 08:33

## 2024-04-15 ASSESSMENT — ACTIVITIES OF DAILY LIVING (ADL)
ADLS_ACUITY_SCORE: 39

## 2024-04-15 NOTE — PROCEDURES
[facility]    Procedure: IR Procedure Note    Date/Time: 4/15/2024 10:40 AM    Performed by: Roger Garrido PA-C  Authorized by: Roger Garrido PA-C  IR Fellow Physician:  Other(s) attending procedure: : Tuyet Stevens PA-C      UNIVERSAL PROTOCOL   Site Marked: NA  Prior Images Obtained and Reviewed:  Yes  Required items: Required blood products, implants, devices and special equipment available    Patient identity confirmed:  Verbally with patient, arm band, provided demographic data and hospital-assigned identification number  Patient was reevaluated immediately before administering moderate or deep sedation or anesthesia  Confirmation Checklist:  Patient's identity using two indicators, relevant allergies, procedure was appropriate and matched the consent or emergent situation and correct equipment/implants were available  Time out: Immediately prior to the procedure a time out was called    Universal Protocol: the Joint Commission Universal Protocol was followed    Preparation: Patient was prepped and draped in usual sterile fashion    ESBL (mL):  3     ANESTHESIA    Anesthesia:  Local infiltration  Local Anesthetic:  Lidocaine 1% without epinephrine  Anesthetic Total (mL):  7      SEDATION  Patient Sedated: Yes    Sedation Type:  Moderate (conscious) sedation  Sedation:  Fentanyl and midazolam  Vital signs: Vital signs monitored during sedation    See dictated procedure note for full details.  Findings: Image guided placement of right internal jugular, 9.5 Fr., 23 cm., dual lumen tunneled central venous Ann catheter. Catheter is ready for use.    Specimens: none    Complications: None    Condition: Stable    Plan: Follow up per primary team. Return to IR for catheter removal when indicated.      PROCEDURE    Patient Tolerance:  Patient tolerated the procedure well with no immediate complications  Length of time physician/provider present for  1:1 monitoring during sedation: 30

## 2024-04-15 NOTE — PROGRESS NOTES
Patient Name: Dinora Mcghee  Medical Record Number: [id number]  Today's Date: 4/15/2024    Procedure: tunneled central line placement.  Proceduralist: MORENO Stevens.  Pathology present: n/a    Procedure Start: 1007  Procedure end: 1035  Sedation medications administered: Fentanyl: 50 mcg Versed:1mg     Report given to: 2A,RN  : n/a    Other Notes: Pt arrived to IR room 5 from . Consent reviewed. Pt denies any questions or concerns regarding procedure. Pt positioned supine and monitored per protocol. Pt tolerated procedure without any noted complications. Pt transferred back to .    Rosa Maria Live, RN

## 2024-04-15 NOTE — PROGRESS NOTES
Pt arrived on 2a post tunneled line placement. VSS Ra. Sites c/d/I. No pain. Family at bedside. Pt resting at this time

## 2024-04-15 NOTE — PROGRESS NOTES
0824 Glucose level 74, pt states she feels tired and foggy. Received order for glucose 50%, 25ml given at this time through PICC line. Glucose recheck 146.

## 2024-04-15 NOTE — DISCHARGE INSTRUCTIONS
Interventional Radiology                 Discharge Instructions                       Following Tunneled Catheter Placement    Your site(s) has been closed with:     The Catheter is sutured  to skin at  insertion site    Derma Conde (Skin Glue) on neck incision  Do not apply any ointments over site  This thin layer will slough off in 7-10 days               May gently remove Derma Conde in 10 days if still present         Tunneled catheter is always covered with a Sterile Transparent dressing  Keep site clean and dry   Cover with an occlusive dressing for showering  Weekly transparent dressing changes or when it becomes wet or dirty     If there is any oozing or bleeding from the site, apply direct pressure for 5-10 minutes with a gauze pad.  If bleeding continues after 10 minutes call your primary doctor.  If bleeding cannot be controlled with direct pressure, call 911.    Call your Doctor if:  Bleeding as above  Swelling in your neck or arm  Sudden onset of shortness of breath, lightheadedness, or heart palpitations..  Fever greater than 100.5  F  Other signs of infection such as, redness, tenderness, or drainage from the wound.    If you were given sedation:  We recommend an adult stay with you for the first 24 hours.  No driving or alcoholic beverages for 24 hours.    ADDITIONAL INSTRUCTIONS:           If you are on Coumadin you may restart tonight.  Follow up Coumadin Clinic or Primary Care MD         To have your INR rechecked.           No heavy lifting greater than 10 lbs for 3 days.           May use ice pack for pain or minor swelling for 15 min 3-4 times per day.             *Plastic Clamps given    [facility] Interventional Radiology Department    Physician: Tuyet Stevens PA-C             Date:April 15, 2024  Telephone Numbers:  [phone]      Monday-Friday 7:30 am to 4:00 pm  Hospital : [phone]....After hours, Weekends, & Holidays.  Ask for the Interventional Radiologist on call.   Someone is on call 24 hours a day.   Emergency Department:  [phone]

## 2024-04-15 NOTE — PRE-PROCEDURE
GENERAL PRE-PROCEDURE:     Verbal consent obtained?: Yes    Written consent obtained?: Yes    Risks and benefits: Risks, benefits and alternatives were discussed    Consent given by:  Patient  Patient states understanding of procedure being performed: Yes    Patient's understanding of procedure matches consent: Yes    Procedure consent matches procedure scheduled: Yes    Expected level of sedation:  Moderate  Appropriately NPO:  Yes  ASA Class:  2  Mallampati  :  Grade 1- soft palate, uvula, tonsillar pillars, and posterior pharyngeal wall visible  Lungs:  Lungs clear with good breath sounds bilaterally  Heart:  Normal heart sounds and rate  History & Physical reviewed:  History and physical reviewed and no updates needed  Statement of review:  I have reviewed the lab findings, diagnostic data, medications, and the plan for sedation     Home

## 2024-04-15 NOTE — CONFIDENTIAL NOTE
Called patient with plan for tube exchange with Dr Dodson tomorrow, arrival at 10:30 am, procedure at 12:30 pm.     Patient will work on finding a ride and call us back if she cannot find a ride to procedure.    ML

## 2024-04-15 NOTE — PROGRESS NOTES
Pt tolerated recovery without complication. Discharge instructions reviewed, copy given to pt. Emergency clamps with directions given to pt. Pt's glucose at 54 at 1200, grape juice given and pt also took home dose of glucagon. Glucose increase to 117 per monitor. Pt tolerated oral intake and ambulation. Voided. PICC line dc'd per order. Tip intact and dressing applied. Pt discharged home accompanied by .

## 2024-04-16 ENCOUNTER — ANESTHESIA (OUTPATIENT)
Dept: SURGERY | Facility: CLINIC | Age: 59
End: 2024-04-16
Payer: COMMERCIAL

## 2024-04-16 ENCOUNTER — HOSPITAL ENCOUNTER (OUTPATIENT)
Facility: CLINIC | Age: 59
Discharge: HOME | End: 2024-04-16
Attending: INTERNAL MEDICINE | Admitting: INTERNAL MEDICINE
Payer: COMMERCIAL

## 2024-04-16 ENCOUNTER — ANESTHESIA EVENT (OUTPATIENT)
Dept: SURGERY | Facility: CLINIC | Age: 59
End: 2024-04-16
Payer: COMMERCIAL

## 2024-04-16 ENCOUNTER — APPOINTMENT (OUTPATIENT)
Dept: GENERAL RADIOLOGY | Facility: CLINIC | Age: 59
End: 2024-04-16
Attending: INTERNAL MEDICINE
Payer: COMMERCIAL

## 2024-04-16 VITALS
OXYGEN SATURATION: 100 % | HEART RATE: 80 BPM | SYSTOLIC BLOOD PRESSURE: 120 MMHG | TEMPERATURE: 98 F | HEIGHT: 68 IN | RESPIRATION RATE: 16 BRPM | BODY MASS INDEX: 19.21 KG/M2 | DIASTOLIC BLOOD PRESSURE: 76 MMHG | WEIGHT: 126.76 LBS

## 2024-04-16 LAB
GLUCOSE BLDC GLUCOMTR-MCNC: 179 MG/DL (ref 70–99)
GLUCOSE BLDC GLUCOMTR-MCNC: 31 MG/DL (ref 70–99)
GLUCOSE BLDC GLUCOMTR-MCNC: 67 MG/DL (ref 70–99)
GLUCOSE BLDC GLUCOMTR-MCNC: 97 MG/DL (ref 70–99)
UPPER GI ENDOSCOPY: NORMAL

## 2024-04-16 PROCEDURE — 258N000001 HC RX 258: Performed by: ANESTHESIOLOGY

## 2024-04-16 PROCEDURE — 44372 SMALL BOWEL ENDOSCOPY: CPT

## 2024-04-16 PROCEDURE — 82962 GLUCOSE BLOOD TEST: CPT

## 2024-04-16 PROCEDURE — 370N000017 HC ANESTHESIA TECHNICAL FEE, PER MIN: Performed by: INTERNAL MEDICINE

## 2024-04-16 PROCEDURE — 999N000179 XR SURGERY CARM FLUORO LESS THAN 5 MIN W STILLS: Mod: TC

## 2024-04-16 PROCEDURE — 250N000011 HC RX IP 250 OP 636: Performed by: ANESTHESIOLOGY

## 2024-04-16 PROCEDURE — 272N000001 HC OR GENERAL SUPPLY STERILE: Performed by: INTERNAL MEDICINE

## 2024-04-16 PROCEDURE — 255N000002 HC RX 255 OP 636: Performed by: INTERNAL MEDICINE

## 2024-04-16 PROCEDURE — 44372 SMALL BOWEL ENDOSCOPY: CPT | Performed by: ANESTHESIOLOGY

## 2024-04-16 PROCEDURE — 710N000012 HC RECOVERY PHASE 2, PER MINUTE: Performed by: INTERNAL MEDICINE

## 2024-04-16 PROCEDURE — 258N000001 HC RX 258: Performed by: STUDENT IN AN ORGANIZED HEALTH CARE EDUCATION/TRAINING PROGRAM

## 2024-04-16 PROCEDURE — 360N000082 HC SURGERY LEVEL 2 W/ FLUORO, PER MIN: Performed by: INTERNAL MEDICINE

## 2024-04-16 PROCEDURE — 258N000003 HC RX IP 258 OP 636: Performed by: ANESTHESIOLOGY

## 2024-04-16 PROCEDURE — 999N000141 HC STATISTIC PRE-PROCEDURE NURSING ASSESSMENT: Performed by: INTERNAL MEDICINE

## 2024-04-16 RX ORDER — NICOTINE POLACRILEX 4 MG
15-30 LOZENGE BUCCAL
Status: DISCONTINUED | OUTPATIENT
Start: 2024-04-16 | End: 2024-04-16 | Stop reason: HOSPADM

## 2024-04-16 RX ORDER — LIDOCAINE 40 MG/G
CREAM TOPICAL
Status: DISCONTINUED | OUTPATIENT
Start: 2024-04-16 | End: 2024-04-16 | Stop reason: HOSPADM

## 2024-04-16 RX ORDER — IOPAMIDOL 510 MG/ML
INJECTION, SOLUTION INTRAVASCULAR PRN
Status: DISCONTINUED | OUTPATIENT
Start: 2024-04-16 | End: 2024-04-16 | Stop reason: HOSPADM

## 2024-04-16 RX ORDER — SODIUM CHLORIDE, SODIUM LACTATE, POTASSIUM CHLORIDE, CALCIUM CHLORIDE 600; 310; 30; 20 MG/100ML; MG/100ML; MG/100ML; MG/100ML
INJECTION, SOLUTION INTRAVENOUS CONTINUOUS PRN
Status: DISCONTINUED | OUTPATIENT
Start: 2024-04-16 | End: 2024-04-16

## 2024-04-16 RX ORDER — DEXTROSE MONOHYDRATE 25 G/50ML
25 INJECTION, SOLUTION INTRAVENOUS ONCE
Status: COMPLETED | OUTPATIENT
Start: 2024-04-16 | End: 2024-04-16

## 2024-04-16 RX ORDER — ONDANSETRON 2 MG/ML
4 INJECTION INTRAMUSCULAR; INTRAVENOUS EVERY 6 HOURS PRN
Status: CANCELLED | OUTPATIENT
Start: 2024-04-16

## 2024-04-16 RX ORDER — FENTANYL CITRATE 50 UG/ML
25 INJECTION, SOLUTION INTRAMUSCULAR; INTRAVENOUS
Status: DISCONTINUED | OUTPATIENT
Start: 2024-04-16 | End: 2024-04-16 | Stop reason: HOSPADM

## 2024-04-16 RX ORDER — NALOXONE HYDROCHLORIDE 0.4 MG/ML
0.1 INJECTION, SOLUTION INTRAMUSCULAR; INTRAVENOUS; SUBCUTANEOUS
Status: DISCONTINUED | OUTPATIENT
Start: 2024-04-16 | End: 2024-04-16 | Stop reason: HOSPADM

## 2024-04-16 RX ORDER — PROCHLORPERAZINE MALEATE 5 MG
10 TABLET ORAL EVERY 6 HOURS PRN
Status: CANCELLED | OUTPATIENT
Start: 2024-04-16

## 2024-04-16 RX ORDER — ONDANSETRON 2 MG/ML
4 INJECTION INTRAMUSCULAR; INTRAVENOUS
Status: DISCONTINUED | OUTPATIENT
Start: 2024-04-16 | End: 2024-04-16 | Stop reason: HOSPADM

## 2024-04-16 RX ORDER — ONDANSETRON 4 MG/1
4 TABLET, ORALLY DISINTEGRATING ORAL EVERY 6 HOURS PRN
Status: CANCELLED | OUTPATIENT
Start: 2024-04-16

## 2024-04-16 RX ORDER — PROPOFOL 10 MG/ML
INJECTION, EMULSION INTRAVENOUS CONTINUOUS PRN
Status: DISCONTINUED | OUTPATIENT
Start: 2024-04-16 | End: 2024-04-16

## 2024-04-16 RX ORDER — FLUMAZENIL 0.1 MG/ML
0.2 INJECTION, SOLUTION INTRAVENOUS
Status: CANCELLED | OUTPATIENT
Start: 2024-04-16 | End: 2024-04-17

## 2024-04-16 RX ORDER — DEXTROSE MONOHYDRATE 25 G/50ML
25-50 INJECTION, SOLUTION INTRAVENOUS
Status: DISCONTINUED | OUTPATIENT
Start: 2024-04-16 | End: 2024-04-16 | Stop reason: HOSPADM

## 2024-04-16 RX ADMIN — FENTANYL CITRATE 25 MCG: 50 INJECTION, SOLUTION INTRAMUSCULAR; INTRAVENOUS at 14:13

## 2024-04-16 RX ADMIN — PROPOFOL 100 MCG/KG/MIN: 10 INJECTION, EMULSION INTRAVENOUS at 13:16

## 2024-04-16 RX ADMIN — PROCHLORPERAZINE EDISYLATE 5 MG: 5 INJECTION INTRAMUSCULAR; INTRAVENOUS at 14:14

## 2024-04-16 RX ADMIN — MIDAZOLAM 2 MG: 1 INJECTION INTRAMUSCULAR; INTRAVENOUS at 13:09

## 2024-04-16 RX ADMIN — FENTANYL CITRATE 25 MCG: 50 INJECTION, SOLUTION INTRAMUSCULAR; INTRAVENOUS at 14:28

## 2024-04-16 RX ADMIN — DEXTROSE MONOHYDRATE 25 ML: 25 INJECTION, SOLUTION INTRAVENOUS at 11:11

## 2024-04-16 RX ADMIN — SODIUM CHLORIDE, POTASSIUM CHLORIDE, SODIUM LACTATE AND CALCIUM CHLORIDE: 600; 310; 30; 20 INJECTION, SOLUTION INTRAVENOUS at 13:37

## 2024-04-16 RX ADMIN — SODIUM CHLORIDE, POTASSIUM CHLORIDE, SODIUM LACTATE AND CALCIUM CHLORIDE: 600; 310; 30; 20 INJECTION, SOLUTION INTRAVENOUS at 13:15

## 2024-04-16 RX ADMIN — DEXTROSE MONOHYDRATE 50 ML: 25 INJECTION, SOLUTION INTRAVENOUS at 14:18

## 2024-04-16 ASSESSMENT — ACTIVITIES OF DAILY LIVING (ADL)
ADLS_ACUITY_SCORE: 39

## 2024-04-16 NOTE — PATIENT INSTRUCTIONS
"Recommendations from today's MTM visit:                                                       Hattie to follow-up on med access discussion and will re-visit discussion at follow-up.    Hattie to review duloxetine capsule alternate administration and will discuss at follow-up.    Follow-up: 4/18 for telephone call    It was great speaking with you today.  I value your experience and would be very thankful for your time in providing feedback in our clinic survey. In the next few days, you may receive an email or text message from [employer] with a link to a survey related to your  clinical pharmacist.\"     To schedule another MTM appointment, please call the clinic directly or you may call the MTM scheduling line at [phone].    My Clinical Pharmacist's contact information:                                                      Please feel free to contact me with any questions or concerns you have.      Hattie VanceD, BCACP  MTM Pharmacist   [facility]   Phone: [phone]    "

## 2024-04-16 NOTE — ANESTHESIA CARE TRANSFER NOTE
Patient: Dinora Mcghee    Procedure: Procedure(s):  REPLACEMENT, JEJUNOSTOMY TUBE, PERCUTANEOUS       Diagnosis: Encounter for care related to feeding tube [Z46.59]  Diagnosis Additional Information: No value filed.    Anesthesia Type:   MAC     Note:    Oropharynx: oropharynx clear of all foreign objects  Level of Consciousness: awake  Oxygen Supplementation: nasal cannula  Level of Supplemental Oxygen (L/min / FiO2): 2  Independent Airway: airway patency satisfactory and stable  Dentition: dentition unchanged  Vital Signs Stable: post-procedure vital signs reviewed and stable  Report to RN Given: handoff report given  Patient transferred to: Phase II    Handoff Report: Identifed the Patient, Identified the Reponsible Provider, Reviewed the pertinent medical history, Discussed the surgical course, Reviewed Intra-OP anesthesia mangement and issues during anesthesia, Set expectations for post-procedure period and Allowed opportunity for questions and acknowledgement of understanding    Vitals:  Vitals Value Taken Time   BP     Temp     Pulse     Resp     SpO2 100 % 04/16/24 1353   Vitals shown include unfiled device data.    Electronically Signed By: NATALY Kat CRNA  April 16, 2024  1:53 PM

## 2024-04-16 NOTE — DISCHARGE INSTRUCTIONS
Contacting your Doctor -   To contact a doctor, call Dr. Dodson's office at [phone]  call for Gastroenterology (answered 24 hours a day)   Emergency Department:  [facility]: [phone] 911 if you are in need of immediate or emergent help

## 2024-04-16 NOTE — ANESTHESIA PREPROCEDURE EVALUATION
Anesthesia Pre-Procedure Evaluation    Patient: Dinora Mcghee   MRN: [id number] : 1965        Procedure : Procedure(s):  REPLACEMENT, JEJUNOSTOMY TUBE, PERCUTANEOUS          Past Medical History:   Diagnosis Date    Allergic rhinitis, cause unspecified     Allergy to other foods     strawberries, apples, celeries, alice, watermelon    Arthritis     left knee    Choledocholithiasis     long after cholecystectomy    Chronic abdominal pain     Chronic constipation     Chronic nausea     Chronic pancreatitis (H)     Degeneration of lumbar or lumbosacral intervertebral disc     Esophageal reflux     w/ hiatal hernia    Gastroparesis     Hiatal hernia     History of pituitary adenoma     s/p resection    Hypothyroidism     Migraines     Mild hyperlipidemia     On tube feeding diet     presence of GJ tube    Pancreatic disease     PD stricture, 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 endometriosis growth    APPENDECTOMY       SECTION  1993    COLONOSCOPY  2014    COLONOSCOPY N/A 2023    Procedure: COLONOSCOPY, WITH POLYPECTOMY AND BIOPSY;  Surgeon: Percy Chamorro MD;  [facility]: [facility]    ENDOSCOPIC INSERTION TUBE GASTROSTOMY N/A 2021    Procedure: Gastrojejonostomy placement with jejunopexy, PEG tube exchange;  Surgeon: Zackery Montoya MD;  [facility]    ENDOSCOPIC RETROGRADE CHOLANGIOPANCREATOGRAM N/A 2015    Procedure: ENDOSCOPIC RETROGRADE CHOLANGIOPANCREATOGRAM;  Surgeon: Mandeep Park MD;  [facility]    ENDOSCOPIC RETROGRADE CHOLANGIOPANCREATOGRAM N/A 2016    Procedure: COMBINED ENDOSCOPIC RETROGRADE CHOLANGIOPANCREATOGRAPHY, PLACE  TUBE/STENT;  Surgeon: Mandeep Park MD;  [facility]    ENDOSCOPIC RETROGRADE CHOLANGIOPANCREATOGRAM N/A 03/17/2016    Procedure: COMBINED ENDOSCOPIC RETROGRADE CHOLANGIOPANCREATOGRAPHY, REMOVE FOREIGN BODY OR STENT/TUBE;  Surgeon: Mandeep Park MD;  [facility]    ENDOSCOPIC RETROGRADE CHOLANGIOPANCREATOGRAM N/A 08/02/2016    Procedure: COMBINED ENDOSCOPIC RETROGRADE CHOLANGIOPANCREATOGRAPHY, PLACE TUBE/STENT;  Surgeon: Mandeep Park MD;  [facility]    ENDOSCOPIC RETROGRADE CHOLANGIOPANCREATOGRAM N/A 08/26/2016    Procedure: COMBINED ENDOSCOPIC RETROGRADE CHOLANGIOPANCREATOGRAPHY, REMOVE FOREIGN BODY OR STENT/TUBE;  Surgeon: Mandeep Park MD;  [facility]    ENDOSCOPIC ULTRASOUND UPPER GASTROINTESTINAL TRACT (GI) N/A 10/03/2016    Procedure: ENDOSCOPIC ULTRASOUND, ESOPHAGOSCOPY / UPPER GASTROINTESTINAL TRACT (GI);  Surgeon: Guru Jose Rodas MD;  Location:  OR    ENT SURGERY  1989    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    ENTEROSCOPY SMALL BOWEL N/A 02/03/2022    Procedure: ENTEROSCOPY with possible fistula closure;  Surgeon: Francisco Dodson MD;  Location:  GI    ESOPHAGOSCOPY, GASTROSCOPY, DUODENOSCOPY (EGD), COMBINED N/A 06/24/2015    Procedure: COMBINED ESOPHAGOSCOPY, GASTROSCOPY, DUODENOSCOPY (EGD), REMOVE FOREIGN BODY;  Surgeon: Mandeep Park MD;  Location:  GI    ESOPHAGOSCOPY, GASTROSCOPY, DUODENOSCOPY (EGD), COMBINED N/A 10/25/2015    Procedure: COMBINED ESOPHAGOSCOPY, GASTROSCOPY, DUODENOSCOPY (EGD);  Surgeon: Sammy Amaro MD;  Location:  GI    ESOPHAGOSCOPY, GASTROSCOPY, DUODENOSCOPY (EGD), COMBINED N/A 10/25/2015    Procedure: COMBINED ESOPHAGOSCOPY, GASTROSCOPY, DUODENOSCOPY (EGD), BIOPSY SINGLE OR MULTIPLE;  Surgeon: Sammy Amaro MD;  Location:  GI    ESOPHAGOSCOPY, GASTROSCOPY, DUODENOSCOPY (EGD), COMBINED N/A 01/31/2023    Procedure: ESOPHAGOGASTRODUODENOSCOPY (EGD) with peg replacement ;   Surgeon: Mandeep Park MD;  [facility]    ESOPHAGOSCOPY, GASTROSCOPY, DUODENOSCOPY (EGD), DILATATION, COMBINED      EXCISE LESION TRUNK N/A 04/17/2017    Procedure: EXCISE LESION TRUNK;  Removal of Abdominal Foreign Body;  Surgeon: Nestor Phoenix MD;  [facility]    HC ESOPH/GAS REFLUX TEST W NASAL IMPED >1 HR N/A 11/19/2015    Procedure: ESOPHAGEAL IMPEDENCE FUNCTION TEST WITH 24 HOUR PH GREATER THAN 1 HOUR;  Surgeon: Thiago Apple MD;  [facility]    HC UGI ENDOSCOPY DIAG W BIOPSY  09/17/2008    HC UGI ENDOSCOPY DIAG W BIOPSY  09/27/2012    HC UGI ENDOSCOPY W ESOPHAGEAL DILATION BALLOON <30MM  09/17/2008    HC UGI ENDOSCOPY W EUS N/A 05/05/2015    Procedure: COMBINED ENDOSCOPIC ULTRASOUND, ESOPHAGOSCOPY, GASTROSCOPY, DUODENOSCOPY (EGD);  Surgeon: Wm Dueñas MD;  [facility]    HC WRIST ARTHROSCOP,RELEASE XVERS LIG Bilateral 12/17/2008    INJECT TRANSVERSUS ABDOMINIS PLANE (TAP) BLOCK BILATERAL Left 09/22/2016    Procedure: INJECT TRANSVERSUS ABDOMINIS PLANE (TAP) BLOCK BILATERAL;  Surgeon: Dickson Corrigan MD;  [facility]    INJECT TRIGGER POINT Bilateral 09/08/2022    Procedure: Abdominal trigger point injection with ultrasound;  Surgeon: Monika Mahajan MD;  Location[facility]    INJECT TRIGGER POINT SINGLE / MULTIPLE 3 OR MORE MUSCLES Right 11/15/2022    Procedure: Trigger point injections right abdomen with ultrasound guidance;  Surgeon: Monika Mahajan MD;  Location: [facility]    IR CHEST PORT PLACEMENT < 5 YRS OF AGE  06/10/2022    IR CVC TUNNEL PLACEMENT > 5 YRS OF AGE  4/15/2024    IR PORT REMOVAL RIGHT  11/09/2023    laparoscopic pineda  01/01/1995    LAPAROSCOPIC HERNIORRHAPHY INCISIONAL N/A 08/23/2018    Procedure: LAPAROSCOPIC HERNIORRHAPHY INCISIONAL;  Laparoscopic Incisional Hernia Repair with Symbotex Mesh Implant;  Surgeon: Nestor Phoenix MD;  [facility]    LAPAROSCOPIC PANCREATECTOMY, TRANSPLANT AUTO ISLET CELL N/A 12/28/2016    Procedure:  LAPAROSCOPIC PANCREATECTOMY, TRANSPLANT AUTO ISLET CELL;  Surgeon: Nestor Phoenix MD;  [facility]    LAPAROTOMY EXPLORATORY N/A 01/31/2023    Procedure: Exploratory Laparotomy, lysis of adhesions, Perforated J-Junostomy Resection, Replace J-Junostomy site;  Surgeon: Elver Bauer MD;  [facility]    LAPAROTOMY, LYSIS ADHESIONS, COMBINED N/A 01/31/2023    Procedure: Dilatation of jejunostomy feeding tube, track with placement of jejunostomy tube with fluoroscopy;  Surgeon: Elver Bauer MD;  [facility]    PICC DOUBLE LUMEN PLACEMENT Left 11/13/2023    Basilic Vein 5F DL 43 cm    REMOVE AND REPLACE BREAST IMPLANT PROSTHESIS N/A 12/30/2022    Procedure: Exploratory Laparotomy, lysis of adhesions, small bowel resection. Placement of gastric jejunostomy for enteral feeding.;  Surgeon: Elver Bauer MD;  [facility]    REMOVE GASTROSTOMY TUBE ADULT N/A 01/28/2022    Procedure: REMOVAL, GASTROSTOMY TUBE, ADULT;  Surgeon: Mandeep Park MD;  [facility]    REPLACE GASTROJEJUNOSTOMY TUBE, PERCUTANEOUS N/A 09/07/2021    Procedure: GASTROJEJUNOSTOMY TUBE PLACEMENT, PERCUTANEOUS, WITH GASTROPEXY;  Surgeon: Mandeep Park MD;  [facility]    REPLACE GASTROJEJUNOSTOMY TUBE, PERCUTANEOUS N/A 09/22/2021    Procedure: REPLACEMENT, GASTROJEJUNOSTOMY TUBE, PERCUTANEOUS;  Surgeon: Zackery Montoya MD;  [facility]    REPLACE GASTROJEJUNOSTOMY TUBE, PERCUTANEOUS N/A 11/22/2022    Procedure: REPLACEMENT, GASTROJEJUNOSTOMY TUBE, PERCUTANEOUS;  Surgeon: Mandeep Park MD;  [facility]    REPLACE GASTROJEJUNOSTOMY TUBE, PERCUTANEOUS N/A 12/09/2022    Procedure: REPLACEMENT, GASTROJEJUNOSTOMY TUBE, PERCUTANEOUS with ENDOSCOPIC SUTURING.;  Surgeon: Mandeep Park MD;  [facility]    REPLACE GASTROJEJUNOSTOMY TUBE, PERCUTANEOUS N/A 08/01/2023    Procedure: Replace Gastrojejunostomy Tube, Percutaneous;  Surgeon: Mandeep Park  MD;  [facility]    REPLACE GASTROJEJUNOSTOMY TUBE, PERCUTANEOUS N/A 2023    Procedure: Replace Gastrojejunostomy Tube, Percutaneous;  Surgeon: Francisco Dodson MD;  [facility]    REPLACE GASTROJEJUNOSTOMY TUBE, PERCUTANEOUS N/A 2023    Procedure: Replace Gastrojejunostomy Tube, Percutaneous;  Surgeon: Mandeep Park MD;  [facility]    REPLACE JEJUNOSTOMY TUBE, PERCUTANEOUS N/A 09/10/2021    Procedure: UPPER ENDOSCOPY, REPLACEMENT OF PERCUTANEOUS GASTROJEJUNOSTOMY TUBE, T-TAG GASTROPEXY;  Surgeon: Zackery Montoya MD;  [facility]    REPLACE JEJUNOSTOMY TUBE, PERCUTANEOUS N/A 2021    Procedure: REPLACEMENT, JEJUNOSTOMY TUBE, PERCUTANEOUS;  Surgeon: Mandeep Park MD;  [facility]    REPLACE JEJUNOSTOMY TUBE, PERCUTANEOUS N/A 2023    Procedure: REPLACEMENT, JEJUNOSTOMY TUBE, PERCUTANEOUS;  Surgeon: Mandeep Park MD;  [facility]    REPLACE JEJUNOSTOMY TUBE, PERCUTANEOUS  2023    Procedure: Replace Jejunostomy Tube, Percutaneous;  Surgeon: Mandeep Park MD;  [facility]    transphenoidal pituitary resection  1990    [facility]  DELIVERY ONLY  1996    [facility]  DELIVERY ONLY  1998    repeat c section with incidental cystotomy with repair    [facility] EXCIS PITUITARY,TRANSNASAL/SEPTAL  1980    pituitary tumor removed for diabetes insipidus    [facility] TOTAL ABDOM HYSTERECTOMY  1999    w/ bilateral salpingoophorectomy       Allergies   Allergen Reactions    Apple Juice Anaphylaxis    Corticosteroids Other (See Comments)     All oral, IV and injectable steroids are contraindicated (unless in life threatening situations) in [employer] [employer] transplant recipients. They can cause irreversible loss of islet cell function. Please contact patient's transplant care coordinator ADI Gaffney RN at [phone]/pager [phone] and/or endocrinologist prior to administration.      Depakote [Divalproex Sodium] Other  (See Comments)     Chest pain    Zithromax [Azithromycin Dihydrate] Anaphylaxis     Noted in 08 ER    Bromfenac      Other reaction(s): Headache    Codeine      Other reaction(s): Hallucinations    Darvocet [Propoxyphene N-Apap] Itching    Morphine Nausea and Vomiting and Rash    Nalbuphine Hcl Rash     RASH :nubaine    Zosyn [Piperacillin-Tazobactam In Dex] Rash     Possible allergy, did have a diffuse rash that seemed drug related but could have also been related to soap in the hospital.     Bactrim [Sulfamethoxazole W-Trimethoprim] Other (See Comments) and Nausea and Vomiting     Severely low liver function.    Statins Other (See Comments)     Previous liver issues, risks vs benefits felt to not warrant statin.  Discussed Oct 2022 visit    Tape [Adhesive Tape] Blisters    Tramadol     Zofran [Ondansetron] Other (See Comments)     migraine    Flagyl [Metronidazole] Hives and Rash      Social History     Tobacco Use    Smoking status: Former     Current packs/day: 0.00     Average packs/day: 0.5 packs/day for 6.3 years (3.2 ttl pk-yrs)     Types: Cigarettes     Start date: 1985     Quit date: 1992     Years since quittin.3    Smokeless tobacco: Not on file    Tobacco comments:     no 2nd hand   Substance Use Topics    Alcohol use: Not Currently     Alcohol/week: 3.0 - 6.0 standard drinks of alcohol     Types: 1 - 2 Glasses of wine, 1 - 2 Cans of beer, 1 - 2 Shots of liquor per week     Comment: none since IGG      Wt Readings from Last 1 Encounters:   24 57.5 kg (126 lb 12.2 oz)        Anesthesia Evaluation   Pt has had prior anesthetic. Type: General.    History of anesthetic complications  - PONV.      ROS/MED HX  ENT/Pulmonary:  - neg pulmonary ROS     Neurologic:     (+)      migraines,                          Cardiovascular:     (+) Dyslipidemia - -   -  - -                                      METS/Exercise Tolerance:     Hematologic: Comments: Lab Test        04/15/24     03/20/24      11/15/23     11/10/23     11/08/23                       0859          1542          0700          0716          2011          WBC           --          6.0           --          5.2          9.7           HGB           --          13.2          --          12.2         11.6*         MCV           --          93            --          94           97            PLT           --          377           --          328          299           INR          1.21*         --          1.15          --          1.11           Lab Test        04/16/24     04/15/24     04/15/24     03/20/24     11/15/23     11/15/23     11/12/23     11/12/23                       1059          0859          0824          1542          0857          0700          0936          0603          NA            --           --           --          140           --          139           --          140           POTASSIUM     --           --           --          4.4           --          4.6           --          3.6           CHLORIDE      --           --           --          101           --          101           --          102           CO2           --           --           --          28            --          30*           --          28            BUN           --           --           --          16.5          --          9.1           --          7.3           CR            --           --           --          0.57          --          0.56          --          0.53          ANIONGAP      --           --           --          11            --          8             --          10            KASSI           --           --           --          9.4           --          9.2           --          8.7           GLC          67*          146*         74           99             < >        125*           < >        110*           < > = values in this interval not displayed.                       Musculoskeletal:   (+)  arthritis,              GI/Hepatic: Comment: Status post pancreas transplantation     (+) GERD, Asymptomatic on medication,    hiatal hernia,              Renal/Genitourinary:  - neg Renal ROS     Endo:     (+)  type II DM,        thyroid problem, hypothyroidism,           Psychiatric/Substance Use:  - neg psychiatric ROS     Infectious Disease:  - neg infectious disease ROS     Malignancy:  - neg malignancy ROS     Other:  - neg other ROS    (+)  , H/O Chronic Pain,         Physical Exam    Airway        Mallampati: II   TM distance: > 3 FB   Neck ROM: full   Mouth opening: > 3 cm    Respiratory Devices and Support         Dental       (+) Minor Abnormalities - some fillings, tiny chips      Cardiovascular   cardiovascular exam normal          Pulmonary   pulmonary exam normal                OUTSIDE LABS:  CBC:   Lab Results   Component Value Date    WBC 6.0 03/20/2024    WBC 5.2 11/10/2023    HGB 13.2 03/20/2024    HGB 12.2 11/10/2023    HCT 40.7 03/20/2024    HCT 37.8 11/10/2023     03/20/2024     11/10/2023     BMP:   Lab Results   Component Value Date     03/20/2024     11/15/2023    POTASSIUM 4.4 03/20/2024    POTASSIUM 4.6 11/15/2023    CHLORIDE 101 03/20/2024    CHLORIDE 101 11/15/2023    CO2 28 03/20/2024    CO2 30 (H) 11/15/2023    BUN 16.5 03/20/2024    BUN 9.1 11/15/2023    CR 0.57 03/20/2024    CR 0.56 11/15/2023    GLC 67 (L) 04/16/2024     (H) 04/15/2024     COAGS:   Lab Results   Component Value Date    PTT 29 01/07/2017    INR 1.21 (H) 04/15/2024    FIBR 225 12/28/2016     POC:   Lab Results   Component Value Date    BGM 85 08/24/2018    HCG Negative 12/19/2016     HEPATIC:   Lab Results   Component Value Date    ALBUMIN 4.5 03/20/2024    PROTTOTAL 7.5 03/20/2024    ALT 14 03/20/2024    AST 24 03/20/2024     (H) 06/04/2023    ALKPHOS 101 03/20/2024    BILITOTAL 0.3 03/20/2024     OTHER:   Lab Results   Component Value Date    PH 7.49 (H) 12/28/2016    LACT 0.6 (L) 11/08/2023    A1C  5.6 03/20/2024    KASSI 9.4 03/20/2024    PHOS 4.5 11/15/2023    MAG 1.9 11/15/2023    LIPASE 6 (L) 06/04/2023    AMYLASE 45 01/23/2017    TSH 0.89 11/12/2023    T4 1.13 03/23/2018    CRP 90.0 (H) 12/29/2016    SED 10 09/16/2021       Anesthesia Plan    ASA Status:  3       Anesthesia Type: MAC.     - Reason for MAC: immobility needed   Induction: Propofol.   Maintenance: TIVA.        Consents    Anesthesia Plan(s) and associated risks, benefits, and realistic alternatives discussed. Questions answered and patient/representative(s) expressed understanding.     - Discussed:     - Discussed with:  Patient      - Extended Intubation/Ventilatory Support Discussed: No.      - Patient is DNR/DNI Status: No     Use of blood products discussed: No .     Postoperative Care    Pain management: IV analgesics.   PONV prophylaxis: Dexamethasone or Solumedrol, Ondansetron (or other 5HT-3)     Comments:               Alvaro Avelar MD    I have reviewed the pertinent notes and labs in the chart from the past 30 days and (re)examined the patient.  Any updates or changes from those notes are reflected in this note.            # Coagulation Defect: INR = 1.21 (Ref range: 0.85 - 1.15) and/or PTT = N/A, will monitor for bleeding

## 2024-04-16 NOTE — ANESTHESIA POSTPROCEDURE EVALUATION
Patient: Dinora Mcghee    Procedure: Procedure(s):  REPLACEMENT, JEJUNOSTOMY TUBE, PERCUTANEOUS  Change gastrostomy tube without scope       Anesthesia Type:  MAC    Note:  Disposition: Outpatient   Postop Pain Control: Uneventful            Sign Out: Well controlled pain   PONV: No   Neuro/Psych: Uneventful            Sign Out: Acceptable/Baseline neuro status   Airway/Respiratory: Uneventful            Sign Out: Acceptable/Baseline resp. status   CV/Hemodynamics: Uneventful            Sign Out: Acceptable CV status; No obvious hypovolemia; No obvious fluid overload   Other NRE: NONE   DID A NON-ROUTINE EVENT OCCUR? No           Last vitals:  Vitals Value Taken Time   /81 04/16/24 1445   Temp 36.6  C (97.8  F) 04/16/24 1353   Pulse     Resp 16 04/16/24 1353   SpO2 99 % 04/16/24 1446   Vitals shown include unfiled device data.    Electronically Signed By: Alvaro Avelar MD  April 16, 2024  2:47 PM

## 2024-04-18 ENCOUNTER — VIRTUAL VISIT (OUTPATIENT)
Dept: TRANSPLANT | Facility: CLINIC | Age: 59
End: 2024-04-18
Attending: PEDIATRICS
Payer: COMMERCIAL

## 2024-04-18 ENCOUNTER — VIRTUAL VISIT (OUTPATIENT)
Dept: GASTROENTEROLOGY | Facility: CLINIC | Age: 59
End: 2024-04-18
Attending: INTERNAL MEDICINE
Payer: COMMERCIAL

## 2024-04-18 DIAGNOSIS — K31.84 GASTROPARESIS: ICD-10-CM

## 2024-04-18 DIAGNOSIS — E10.9 TYPE 1 DIABETES MELLITUS WITHOUT COMPLICATION (H): Primary | ICD-10-CM

## 2024-04-18 DIAGNOSIS — Z93.4 SMALL BOWEL TUBE FEEDING (H): Primary | ICD-10-CM

## 2024-04-18 DIAGNOSIS — F43.21 SITUATIONAL DEPRESSION: ICD-10-CM

## 2024-04-18 PROCEDURE — 99215 OFFICE O/P EST HI 40 MIN: CPT | Mod: 24 | Performed by: PEDIATRICS

## 2024-04-18 PROCEDURE — G2211 COMPLEX E/M VISIT ADD ON: HCPCS | Mod: 95 | Performed by: PEDIATRICS

## 2024-04-18 NOTE — PATIENT INSTRUCTIONS
1)  Keep the 7 units Levemir at beginning of feeds.    2) Try the protein powder in the J-tube that you have found helps with hypoglycemia at bedtime to see if that will help alleviate overnight lows.    3)  It would be better if you can get an option for compounded acetaminophen that is free of syrup/sugar.       Follow Up:  Sept 19 at 3:40pm - virtual

## 2024-04-18 NOTE — Clinical Note
4/18/2024         RE: Dinora Mcghee  [address] [place] [place] [address]        Dear Colleague,    Thank you for referring your patient, Dinora Mcghee, to the [facility]. Please see a copy of my visit note below.    Medication Therapy Management (MTM) Encounter    ASSESSMENT:                            Medication Adherence/Access: {adherencechoices:364467}    ***:  ***      PLAN:                            Amitriptyline -- change to compound Dr. Gomez  2.  Montelukast -- Dinora to ask Dr. Andrade chewable tablets  3.  Levothyroxine -- switch to solution first Dr. Gomez   4.  Baclofen liquid -- Dinora to follow-up with pain clinic   5.  Duloxetine -- Dr. Zhang switch to sprinkle capsules    Follow-up:    -- will follow-up via [employer] message with updates   -- 9:00 AM 5/8 for a telephone call    SUBJECTIVE/OBJECTIVE:                          Dinora Mcghee is a 59 year old female called for a follow-up visit.       Reason for visit: medication questions    Allergies/ADRs: {1/2/3/4/5:377400}  Past Medical History: {1/2/3/4/5:692134}  Tobacco: She reports that she quit smoking about 32 years ago. Her smoking use included cigarettes. She started smoking about 38 years ago. She has a 3.2 pack-year smoking history. She does not have any smokeless tobacco history on file.  Alcohol: {ALCOHOL CONSUMPTION HX:418313}  {Social and Goals:114161}    Medication Adherence/Access: {[facility]:067838}  -- need tube replaced this week  -- Did start movantik, ordered by her pain team    :     Yesterday she was in the bathroom for three hours, and then she was done. Can't pass a formed stool.    -- amitriptyline: currently using tablets through Dr. 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 Is doing better today. Hasn't noticed a difference in mood yet. Still not having regular bowel movements. Feels like she is doing well with everything she has been through. Notes even through all the bad, she is always willing to smile. Notes she is always get a bit sad in the spring because her allergies make her miserable.    Today's Vitals: There were no vitals taken for this visit.  ----------------  {CHARLIE?:904479}    I spent {mtm total time 3:502527} with this patient today. { :305448}. A copy of the visit note was provided to the patient's provider(s).    A summary of these recommendations {GIVEN/NOT GIVEN:601058}.    Hattie Lazo PharmD, BCACP  MTM Pharmacist   [facility]   Phone: [phone]    Telemedicine Visit Details  Type of service:  Telephone visit  Start Time:  11:02 AM  End Time: {video/phone visit end time:871716}     Medication Therapy Recommendations  No medication therapy recommendations to display       Medication Therapy Management (MTM) Encounter    ASSESSMENT:                            Medication Adherence/Access: See below for considerations    Small Bowel Tube Feeding: We discussed the communication that she had sent through [employer] regarding her insurances considerations. After our visit, I was able to find that we did order amitriptyline as a compound after her hospitalization this fall. The discontinuation notes indicate it was too expensive, so I can follow-up with compounding to see how this was run previously.     -- levothyroxine: it appears there is an oral solution available, so we could look into coverage for this  -- duloxetine: there is data for mixing the sprinkle caps with water for administration, so we could try getting these covered  -- montelukast: these are available as chewable tablets  -- baclofen: this is available as a liquid    We discussed that these orders are all under varying providers, so we will have  to connect with each of them to discuss the alternate dosage forms. Dinora has agreed to take on those outside of [facility].     Mood: Improving. We discussed that it would be too soon to expect a significant effect. Will continue to monitor response/side effects. She has follow-up scheduled in May.    PLAN:                            Hattie to connect with Dr. Gomez on the following:    - levothyroxine: switching to an oral solution    - duloxetine: switching to the sprinkle caps    2. Dinora to follow-up on the following:   - Montelukast: ask Dr. Andrade to change the order to chewable tablets   - Baclofen: follow-up with the pain clinic to discuss switching your order to liquid    3. Update: Will connect with compounding about what they were running for amitriptyline (crushed tablets versus bulk powder).   -- message sent to compounding team    Follow-up:    -- will follow-up via [employer] message with updates   -- 9:00 AM 5/8 for a telephone call    SUBJECTIVE/OBJECTIVE:                          Dinora Mcghee is a 59 year old female called for a follow-up visit.       Reason for visit: questions about medication dosage forms    Allergies/ADRs: Reviewed in chart  Tobacco: She reports that she quit smoking about 32 years ago. Her smoking use included cigarettes. She started smoking about 38 years ago. She has a 3.2 pack-year smoking history. She does not have any smokeless tobacco history on file.    Medication Adherence/Access:   -- need tube replaced this week  -- Did start movantik, ordered by her pain team    Small Bowel Tube Feeding:     Yesterday she was in the bathroom for three hours, and then she was done. Can't pass a formed stool, still having lots of liquid. Things are overall better since the last time we talked, but she did get caught up in her tube and ended up having to have her tube replaced this week so she is in pain. Notes it is very raw and sensitive. We meet today to discuss the following  from our conversation last week after I was able to review the correspondence she had with insurance regarding her medications.    -- amitriptyline: Currently using tablets through Dr. Zhang.  -- montelukast 10 mg tablets : doesn't always take (particularly not in the winter), so she has a back stock. Last ordered by Dr. Andrade.  -- levothyroxine: She is at a point where she would like to consider injectable. She does not feel she can get consistent absorption of this.  -- compazine (suppositories) : No change for now. She likes these.  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 This is particularly true in times of high allergy symptoms, like now. She notes significant difficulty with administering capsules.    Of note, I looked back at the fill history and we did try ordering amitriptyline as a compound back in November. It was discontinued with note that this was $200/month and was too expensive for her.    Mood:  Duloxetine 30 mg daily    Notes she was in throws of diarrhea last time we talked. Is doing better today. Hasn't noticed a difference in mood yet. Still not having regular bowel movements. Feels like she is doing well with everything she has been through. Notes even through all the bad, she is always willing to smile. Notes she is always get a bit sad in the spring because her allergies make her miserable.    ----------------    I spent 49 minutes with this patient today. All changes were made via collaborative practice agreement with Vijaya Genao. A copy of the visit note was provided to the patient's provider(s).    A summary of these recommendations was sent via [employer].    Hattie VanceD, BCACP  MTM Pharmacist   [facility]   Phone: [phone]    Telemedicine Visit Details  Type of service:  Telephone visit  Start Time:  11:02 AM  End Time:  11:51 AM     Medication Therapy Recommendations  No medication therapy  recommendations to display         Again, thank you for allowing me to participate in the care of your patient.        Sincerely,        Hattie Lazo RPH

## 2024-04-18 NOTE — Clinical Note
Ap Gomez - I have been working with Dinora on alternate dosage forms for some of the medications she feels she is not absorbing. I am wondering if you would be agreeable to allowing me to see if we could get duloxetine sprinkle caps and levothyroxine oral solution covered for her? The doses would be the same, but may be easier for her to take or give through her G-J tube. Thank you for considering - Hattie

## 2024-04-18 NOTE — LETTER
4/18/2024         RE: Dinora Mcghee  [address] [facility] [address]        Dear Colleague,    Thank you for referring your patient, Dinora Mcghee, to the [facility]. Please see a copy of my visit note below.    Dinora is a 59 year old who is being evaluated via a billable video visit.          Video-Visit Details    Type of service:  Video Visit   Originating Location (pt. Location): Home    Distant Location (provider location):  On-site  Platform used for Video Visit: Emeli  Signed Electronically by: Gloria Melissa MD      Video start 4/18/2024, 4:25:31 pm.  Video End 4/18/2024, 4:48:54 pm.    [facility]  Islet Autotransplant, Diabetes Follow Up    Problem List:  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     Incisional hernia, without obstruction or gangrene     Adhesive capsulitis of shoulder, unspecified laterality     S/P hernia repair     Headache, chronic migraine without aura     Chronic pain syndrome     Iron deficiency anemia     Hypoglycemia     Adult failure to thrive     Abdominal pain, generalized     Gastrostomy tube obstruction (H)     Intra-abdominal abscess (H)     Postprocedural intraabdominal abscess     Acquired absence of spleen     Depressive disorder     Diabetes insipidus (H24)     Other specified abnormal immunological findings in serum     Poisoning by  drug     Sphincter of Oddi dysfunction     Type 1 diabetes mellitus without complication (H)     Myofascial pain     Feeding intolerance     Acute postoperative abdominal pain     Major depression, single episode     History of food intolerance     Malnutrition, unspecified type (H24)     Nausea and vomiting, unspecified vomiting type     On total parenteral nutrition (TPN)     Fever, unspecified fever cause     Chronic pancreatitis, unspecified pancreatitis type (H)     Cholangitis (H28)     Abdominal pain, unspecified abdominal location     Bacteremia           Assessment:  1.  Post pancreatectomy diabetes mellitus, s/p total pancreatectomy and islet autotransplant.  (ICD-10 E89.1)  2.  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   Gastroparesis  4.  Nausea, vomiting      Dinora is a 59 year old with history of chronic pancreatitis who is s/p total pancreatectomy and islet autotransplant.  She has been on and off insulin with A1c in goal range in the past, but more recently with hyper and hypogylcemia.  Right now she is meeting targets for time in range, but does have some intermittent highs, and also lows at night.  Gastroparesis and need for tube feeds complicate the management of her diabetes.         Plan:  1.  Changes to current diabetes regimen:  Patient Instructions   1)  Keep the 7 units Levemir at beginning of feeds.    2) Try the protein powder in the J-tube that you have found helps with hypoglycemia at bedtime to see if that will help alleviate overnight lows.    3)  It would be better if you can get an option for compounded acetaminophen that is free of syrup/sugar.       Follow Up:  Sept 19 at 3:40pm - virtual      2.  Frequency of blood sugar checks:  Keep wearing Yandy-- this is much better for Dinora than fingersticks because with fingersticks we miss the really critical  data of the post prandial excursions.    3.  Continue routine follow up for autoislet transplant patients:  Mixed meal test (6 mL/kg BoostHP to max of 360 mL) at 3 months, 6 months, and once a year post transplant.  Hemoglobin A1c levels at these time points and quarterly.    4.  Other issues addressed today:  none    Follow up:  4 mos    Contact me for questions at [phone] or [phone].  Emergency number to reach pediatric endocrinology after hours is [phone].        Dr. Gloria Melissa MD  [facility]  Director of Research, Islet Auto-transplant Program  Phone:  [phone]  Electronically signed: 2024 at 12:40 PM         Review of the result(s) of each unique test - HbA1c, jim  40 minutes spent on the date of the encounter doing chart review, history and exam, documentation, downloading and reviewing CGM data,  and further activities per the note    The longitudinal plan of care for the diagnosis(es)/condition(s) as documented were addressed during this visit. Due to the added complexity in care, I will continue to support Dinora in the subsequent management and with ongoing continuity of care.        HPI:  Dinora is a 59 year old  female here for follow up oflaparoscopic assisted total pancreatectomy, islet cell autotransplant, splenectomy, gastrojejunostomy tube revision, choledochoduodenostomy, duodenojejunostomy, Vera-Y reconstruction performed on 2016.  At the time of the procedure, the patient received:  Total Islet number: [id number].  Total Islet number/k.  Islet equivalents: [id number]  Islet equivalents/kilogram: 4091    Post-surgical course was complicated by two minor procedures for a retained foreign body near GJ site in 2017 and hernia repair 2018, and severe gastroparesis (refractory to J-feeds, domperidone).  She has been insulin independent since about 1 year after surgery.    - Gastroparesis is her major  issue post TPIAT. Admitted multiple times. GJ was helpful for short time but did not tolerate well.  Has tried multiple other approaches and continues to worsen, nothing offering benefit.  Tried expanded access domperidone but not helpful  - Also has hypoglycemia (prior treatments SGLT2 inhibitor not helpful, acarbose is helpful when eating, also uses mini glucagon).     At today's visit,   --  Medical management is still complicated by severe gastroparesis.  She has really been struggling recently and is a bit down understandably about being so insignificantly affected by gastroparesis with no real improvements.  -- Busy week- had port-a-cath placed and Jtube replacement procedures.  She was low to 31 mg/dL documented on one of these days, had reduced Levemir to half of her usual dose but her feeds were also held during that time.    --Tube feeds vital 1.5, 5 cartons per day, runs at about 100 mL per hour so about 12 hours on feeds.  -- BG seemed to do a bit better when we changed to Levemir rather than glargine but now I see she is having some lows again at night.  -- She notices:  Tylenol which she gets as an OTC syrup makes her BG high, and she notices that when she is having lows and not coming up well the protein powder into her tube helps.     Diabetes history:  Current insulin regimen:  Levemir 7 units  --> We changed to levemir to better match up with tube feed duration    For hypoglycemia she currently uses CGM for early detection plus mini dosing of glucagon as needed.     Wearing Yandy    TIR is 95% which is OK and avg of 116 is much improved since last visit, but my main concern is the 4% time in low, much of that at night               Recent hemoglobin A1c levels:      Lab Results   Component Value Date    A1C 5.6 03/20/2024    A1C 5.4 02/17/2022    A1C 5.2 11/27/2021    A1C 5.5 09/18/2021    A1C 5.4 08/05/2021    A1C 5.7 05/12/2021    A1C 5.9 04/01/2021    A1C 5.5 12/17/2020    A1C 5.8 07/30/2020          Hypoglycemia history: mild lows but rapid falls. The patient has had 0 episodes of severe hypoglycemia (seizure, coma, or neuroglycopenic symptoms severe enough to require assistance from another person).  Blood sugars were reviewed from the patient records and/or the meter download.          Review of systems:  A complete ROS is negative except as noted in HPI above.      Past Medical and Surgical History:  Past Medical History:   Diagnosis Date     Allergic rhinitis, cause unspecified      Allergy to other foods     strawberries, apples, celeries, alice, watermelon     Arthritis     left knee     Choledocholithiasis     long after cholecystectomy     Chronic abdominal pain      Chronic constipation      Chronic nausea      Chronic pancreatitis (H)      Degeneration of lumbar or lumbosacral intervertebral disc      Esophageal reflux     w/ hiatal hernia     Gastroparesis      Hiatal hernia      History of pituitary adenoma     s/p resection     Hypothyroidism      Migraines      Mild hyperlipidemia      On tube feeding diet     presence of GJ tube     Pancreatic disease     PD stricture, 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      PONV (postoperative nausea and vomiting)      Portacath in place      Type 1 diabetes mellitus without complication (H) 2022     Unspecified hearing loss     25% high frequency R     Past Surgical History:   Procedure Laterality Date     ABDOMEN SURGERY  1999    c sections: 93, 96, 98. endometriosis growth     APPENDECTOMY        SECTION  1993     COLONOSCOPY  2014     COLONOSCOPY N/A 2023    Procedure: COLONOSCOPY, WITH POLYPECTOMY AND BIOPSY;  Surgeon: Percy Chamorro MD;  [facility]     ENDOSCOPIC INSERTION TUBE GASTROSTOMY N/A 2021    Procedure: Gastrojejonostomy placement with jejunopexy, PEG tube exchange;  Surgeon: Zackery Montoya MD;  [facility]     ENDOSCOPIC RETROGRADE CHOLANGIOPANCREATOGRAM N/A  06/02/2015    Procedure: ENDOSCOPIC RETROGRADE CHOLANGIOPANCREATOGRAM;  Surgeon: Mandeep Park MD;  [facility]     ENDOSCOPIC RETROGRADE CHOLANGIOPANCREATOGRAM N/A 02/09/2016    Procedure: COMBINED ENDOSCOPIC RETROGRADE CHOLANGIOPANCREATOGRAPHY, PLACE TUBE/STENT;  Surgeon: Mandeep Park MD;  [facility]     ENDOSCOPIC RETROGRADE CHOLANGIOPANCREATOGRAM N/A 03/17/2016    Procedure: COMBINED ENDOSCOPIC RETROGRADE CHOLANGIOPANCREATOGRAPHY, REMOVE FOREIGN BODY OR STENT/TUBE;  Surgeon: Mandeep Park MD;  [facility]     ENDOSCOPIC RETROGRADE CHOLANGIOPANCREATOGRAM N/A 08/02/2016    Procedure: COMBINED ENDOSCOPIC RETROGRADE CHOLANGIOPANCREATOGRAPHY, PLACE TUBE/STENT;  Surgeon: Mandeep Park MD;  [facility]     ENDOSCOPIC RETROGRADE CHOLANGIOPANCREATOGRAM N/A 08/26/2016    Procedure: COMBINED ENDOSCOPIC RETROGRADE CHOLANGIOPANCREATOGRAPHY, REMOVE FOREIGN BODY OR STENT/TUBE;  Surgeon: Mandeep Park MD;  [facility]     ENDOSCOPIC ULTRASOUND UPPER GASTROINTESTINAL TRACT (GI) N/A 10/03/2016    Procedure: ENDOSCOPIC ULTRASOUND, ESOPHAGOSCOPY / UPPER GASTROINTESTINAL TRACT (GI);  Surgeon: Guru Jose Rdoas MD;  Location:  OR     ENT SURGERY  1989    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     ENTEROSCOPY SMALL BOWEL N/A 02/03/2022    Procedure: ENTEROSCOPY with possible fistula closure;  Surgeon: Francisco Dodson MD;  Location:  GI     ESOPHAGOSCOPY, GASTROSCOPY, DUODENOSCOPY (EGD), COMBINED N/A 06/24/2015    Procedure: COMBINED ESOPHAGOSCOPY, GASTROSCOPY, DUODENOSCOPY (EGD), REMOVE FOREIGN BODY;  Surgeon: Mandeep Park MD;  Location:  GI     ESOPHAGOSCOPY, GASTROSCOPY, DUODENOSCOPY (EGD), COMBINED N/A 10/25/2015    Procedure: COMBINED ESOPHAGOSCOPY, GASTROSCOPY, DUODENOSCOPY (EGD);  Surgeon: Sammy Amaro MD;  Location:  GI     ESOPHAGOSCOPY, GASTROSCOPY, DUODENOSCOPY (EGD), COMBINED N/A 10/25/2015    Procedure: COMBINED  ESOPHAGOSCOPY, GASTROSCOPY, DUODENOSCOPY (EGD), BIOPSY SINGLE OR MULTIPLE;  Surgeon: Sammy Amaro MD;  [facility]     ESOPHAGOSCOPY, GASTROSCOPY, DUODENOSCOPY (EGD), COMBINED N/A 01/31/2023    Procedure: ESOPHAGOGASTRODUODENOSCOPY (EGD) with peg replacement ;  Surgeon: Mandeep Park MD;  [facility]     ESOPHAGOSCOPY, GASTROSCOPY, DUODENOSCOPY (EGD), DILATATION, COMBINED       EXCISE LESION TRUNK N/A 04/17/2017    Procedure: EXCISE LESION TRUNK;  Removal of Abdominal Foreign Body;  Surgeon: Nestor Phoenix MD;  [facility]     HC ESOPH/GAS REFLUX TEST W NASAL IMPED >1 HR N/A 11/19/2015    Procedure: ESOPHAGEAL IMPEDENCE FUNCTION TEST WITH 24 HOUR PH GREATER THAN 1 HOUR;  Surgeon: Thiago Apple MD;  [facility]     HC UGI ENDOSCOPY DIAG W BIOPSY  09/17/2008     HC UGI ENDOSCOPY DIAG W BIOPSY  09/27/2012     HC UGI ENDOSCOPY W ESOPHAGEAL DILATION BALLOON <30MM  09/17/2008     HC UGI ENDOSCOPY W EUS N/A 05/05/2015    Procedure: COMBINED ENDOSCOPIC ULTRASOUND, ESOPHAGOSCOPY, GASTROSCOPY, DUODENOSCOPY (EGD);  Surgeon: Wm Dueñas MD;  [facility]     HC WRIST ARTHROSCOP,RELEASE XVERS LIG Bilateral 12/17/2008     INJECT TRANSVERSUS ABDOMINIS PLANE (TAP) BLOCK BILATERAL Left 09/22/2016    Procedure: INJECT TRANSVERSUS ABDOMINIS PLANE (TAP) BLOCK BILATERAL;  Surgeon: Dickson Corrigan MD;  [facility]     INJECT TRIGGER POINT Bilateral 09/08/2022    Procedure: Abdominal trigger point injection with ultrasound;  Surgeon: Monika Mahajan MD;  [facility]     INJECT TRIGGER POINT SINGLE / MULTIPLE 3 OR MORE MUSCLES Right 11/15/2022    Procedure: Trigger point injections right abdomen with ultrasound guidance;  Surgeon: Monika Mahajan MD;  Location: [facility]     IR CHEST PORT PLACEMENT < 5 YRS OF AGE  06/10/2022     IR CVC TUNNEL PLACEMENT > 5 YRS OF AGE  4/15/2024     IR PORT REMOVAL RIGHT  11/09/2023     laparoscopic pineda  01/01/1995     LAPAROSCOPIC  HERNIORRHAPHY INCISIONAL N/A 08/23/2018    Procedure: LAPAROSCOPIC HERNIORRHAPHY INCISIONAL;  Laparoscopic Incisional Hernia Repair with Symbotex Mesh Implant;  Surgeon: Nestor Phoenix MD;  [facility]     LAPAROSCOPIC PANCREATECTOMY, TRANSPLANT AUTO ISLET CELL N/A 12/28/2016    Procedure: LAPAROSCOPIC PANCREATECTOMY, TRANSPLANT AUTO ISLET CELL;  Surgeon: Nestor Phoenix MD;  [facility]     LAPAROTOMY EXPLORATORY N/A 01/31/2023    Procedure: Exploratory Laparotomy, lysis of adhesions, Perforated J-Junostomy Resection, Replace J-Junostomy site;  Surgeon: Elver Bauer MD;  [facility]     LAPAROTOMY, LYSIS ADHESIONS, COMBINED N/A 01/31/2023    Procedure: Dilatation of jejunostomy feeding tube, track with placement of jejunostomy tube with fluoroscopy;  Surgeon: Elver Bauer MD;  [facility]     PICC DOUBLE LUMEN PLACEMENT Left 11/13/2023    Basilic Vein 5F DL 43 cm     REMOVE AND REPLACE BREAST IMPLANT PROSTHESIS N/A 12/30/2022    Procedure: Exploratory Laparotomy, lysis of adhesions, small bowel resection. Placement of gastric jejunostomy for enteral feeding.;  Surgeon: Elver Bauer MD;  [facility]     REMOVE GASTROSTOMY TUBE ADULT N/A 01/28/2022    Procedure: REMOVAL, GASTROSTOMY TUBE, ADULT;  Surgeon: Mandeep Park MD;  [facility]     REPLACE GASTROJEJUNOSTOMY TUBE, PERCUTANEOUS N/A 09/07/2021    Procedure: GASTROJEJUNOSTOMY TUBE PLACEMENT, PERCUTANEOUS, WITH GASTROPEXY;  Surgeon: Mandeep Park MD;  [facility]     REPLACE GASTROJEJUNOSTOMY TUBE, PERCUTANEOUS N/A 09/22/2021    Procedure: REPLACEMENT, GASTROJEJUNOSTOMY TUBE, PERCUTANEOUS;  Surgeon: Zackery Montoya MD;  [facility]     REPLACE GASTROJEJUNOSTOMY TUBE, PERCUTANEOUS N/A 11/22/2022    Procedure: REPLACEMENT, GASTROJEJUNOSTOMY TUBE, PERCUTANEOUS;  Surgeon: Mandeep Park MD;  [facility]     REPLACE GASTROJEJUNOSTOMY TUBE, PERCUTANEOUS N/A  2022    Procedure: REPLACEMENT, GASTROJEJUNOSTOMY TUBE, PERCUTANEOUS with ENDOSCOPIC SUTURING.;  Surgeon: Mandeep Park MD;  [facility]     REPLACE GASTROJEJUNOSTOMY TUBE, PERCUTANEOUS N/A 2023    Procedure: Replace Gastrojejunostomy Tube, Percutaneous;  Surgeon: Mandeep Park MD;  [facility]     REPLACE GASTROJEJUNOSTOMY TUBE, PERCUTANEOUS N/A 2023    Procedure: Replace Gastrojejunostomy Tube, Percutaneous;  Surgeon: Francisco Dodson MD;  [facility]     REPLACE GASTROJEJUNOSTOMY TUBE, PERCUTANEOUS N/A 2023    Procedure: Replace Gastrojejunostomy Tube, Percutaneous;  Surgeon: Mandeep Park MD;  [facility]     REPLACE JEJUNOSTOMY TUBE, PERCUTANEOUS N/A 09/10/2021    Procedure: UPPER ENDOSCOPY, REPLACEMENT OF PERCUTANEOUS GASTROJEJUNOSTOMY TUBE, T-TAG GASTROPEXY;  Surgeon: Zackery Montoya MD;  [facility]     REPLACE JEJUNOSTOMY TUBE, PERCUTANEOUS N/A 2021    Procedure: REPLACEMENT, JEJUNOSTOMY TUBE, PERCUTANEOUS;  Surgeon: Mandeep Park MD;  [facility]     REPLACE JEJUNOSTOMY TUBE, PERCUTANEOUS N/A 2023    Procedure: REPLACEMENT, JEJUNOSTOMY TUBE, PERCUTANEOUS;  Surgeon: Mandeep Park MD;  [facility]     REPLACE JEJUNOSTOMY TUBE, PERCUTANEOUS  2023    Procedure: Replace Jejunostomy Tube, Percutaneous;  Surgeon: Mandeep Park MD;  [facility]     REPLACE JEJUNOSTOMY TUBE, PERCUTANEOUS N/A 2024    Procedure: REPLACEMENT, JEJUNOSTOMY TUBE, PERCUTANEOUS;  Surgeon: Francisco Dodson MD;  [facility]     transphenoidal pituitary resection  1990     [facility]  DELIVERY ONLY  1996     [facility]  DELIVERY ONLY  1998    repeat c section with incidental cystotomy with repair     [facility] EXCIS PITUITARY,TRANSNASAL/SEPTAL  1980    pituitary tumor removed for diabetes insipidus     [facility] TOTAL ABDOM HYSTERECTOMY  1999    w/ bilateral salpingoophorectomy         Family History:  New changes since last visit:  none  Family History   Problem Relation Age of Onset     Lipids Mother      Hypertension Mother      Thyroid Disease Mother      Depression Mother      Angina Mother      GERD Mother      Skin Cancer Mother      Migraines Mother      Autoimmune Disease Mother      Hyperlipidemia Mother      Mental Illness Mother      Cerebrovascular Disease Mother         11/2023     Other Cancer Mother         skin-basil cell     Anxiety Disorder Mother      Eye Disorder Father         cataract, detached retina     Myocardial Infarction Father 60     Lipids Father      Cerebrovascular Disease Father      Depression Father      Substance Abuse Father      Anesthesia Reaction Father         stroke right after surgery     Cataracts Father      Osteoarthritis Father      Ulcerative Colitis Father      Autoimmune Disease Father      Heart Disease Father      Hyperlipidemia Father      Mental Illness Father      Other Cancer Father         myloma     Anxiety Disorder Father      Interpersonal Violence Sister      Coronary Artery Disease Sister         angioplasty     GERD Sister      Substance Abuse Sister      Cerebrovascular Disease Sister         2005     Depression Sister      Thyroid Disease Sister      Eye Disorder Maternal Grandmother         cataract     Thyroid Disease Maternal Grandmother      Diabetes Maternal Grandfather      Eye Disorder Paternal Grandmother         cataract     Diabetes Paternal Grandmother      Eye Disorder Paternal Grandfather         cataract     Diabetes Paternal Grandfather      Substance Abuse Paternal Grandfather      Eye Disorder Son         ptosis     Depression Son      Anxiety Disorder Son      Heart Disease Paternal Aunt      Diabetes Paternal Aunt      Diabetes Paternal Uncle      Heart Disease Paternal Uncle      Depression Nephew      Anxiety Disorder Nephew      Thyroid Disease Nephew      Diabetes Type 2  Cousin         paternal cousin      Autoimmune Disease Cousin      Autoimmune Disease Sister      Depression Sister      Mental Illness Sister      Substance Abuse Sister      Thyroid Disease Sister      Depression Son      Mental Illness Son      Anxiety Disorder Son      Thyroid Disease Nephew      Anxiety Disorder Nephew        Social History:  Social History     Social History Narrative     with 3 children and a dog.  No smoking, etoh or drug use.  Worked as a  for [employer] in [place] in the past.  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  BMI= There is no height or weight on file to calculate BMI.  General:  Appearance is normal, no acute distress  HEENT:  NC/AT, sclera appear white  Neck:  No obvious thyromegaly  CV/Lungs:  Non distressed breathing  Skin:  No apparent rashes  Neuro:  Normal mental status  Psych:  Normal affect      Again, thank you for allowing me to participate in the care of your patient.        Sincerely,        Gloria Melissa MD

## 2024-04-18 NOTE — PROGRESS NOTES
Medication Therapy Management (MTM) Encounter    ASSESSMENT:                            Medication Adherence/Access: See below for considerations    Small Bowel Tube Feeding: We discussed the communication that she had sent through [employer] regarding her insurances considerations. After our visit, I was able to find that we did order amitriptyline as a compound after her hospitalization this fall. The discontinuation notes indicate it was too expensive, so I can follow-up with compounding to see how this was run previously.     -- levothyroxine: it appears there is an oral solution available, so we could look into coverage for this  -- duloxetine: there is data for mixing the sprinkle caps with water for administration, so we could try getting these covered  -- montelukast: these are available as chewable tablets  -- baclofen: this is available as a liquid    We discussed that these orders are all under varying providers, so we will have to connect with each of them to discuss the alternate dosage forms. Dinora has agreed to take on those outside of [facility].     Mood: Improving. We discussed that it would be too soon to expect a significant effect. Will continue to monitor response/side effects. She has follow-up scheduled in May.    PLAN:                            Hattie to connect with Dr. Gomez on the following:    - levothyroxine: switching to an oral solution    - duloxetine: switching to the sprinkle caps   -- update: see below    2. Dinora to follow-up on the following:   - Montelukast: ask Dr. Andrade to change the order to chewable tablets   - Baclofen: follow-up with the pain clinic to discuss switching your order to liquid    3. Update: Will connect with compounding about what they were running for amitriptyline (crushed tablets versus bulk powder).   -- message sent to compounding team    Follow-up:    -- will follow-up via [employer] message with updates   -- 9:00 AM 5/8 for a telephone  call    SUBJECTIVE/OBJECTIVE:                          Dinora Mcghee is a 59 year old female called for a follow-up visit.       Reason for visit: questions about medication dosage forms    Allergies/ADRs: Reviewed in chart  Tobacco: She reports that she quit smoking about 32 years ago. Her smoking use included cigarettes. She started smoking about 38 years ago. She has a 3.2 pack-year smoking history. She does not have any smokeless tobacco history on file.    Medication Adherence/Access:   -- need tube replaced this week  -- Did start movantik, ordered by her pain team    Small Bowel Tube Feeding:     Yesterday she was in the bathroom for three hours, and then she was done. Can't pass a formed stool, still having lots of liquid. Things are overall better since the last time we talked, but she did get caught up in her tube and ended up having to have her tube replaced this week so she is in pain. Notes it is very raw and sensitive. We meet today to discuss the following from our conversation last week after I was able to review the correspondence she had with insurance regarding her medications.    -- amitriptyline: Currently using tablets through Dr. Zhang.  -- montelukast 10 mg tablets : doesn't always take (particularly not in the winter), so she has a back stock. Last ordered by Dr. Andrade.  -- levothyroxine: She is at a point where she would like to consider injectable. She does not feel she can get consistent absorption of this.  -- compazine (suppositories) : No change for now. She likes these.  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 This is particularly true in times of high allergy symptoms, like now. She notes significant difficulty with administering capsules.    Of note, I looked back at the fill history and we did try ordering amitriptyline as a compound back in November. It was discontinued with note that this was $200/month and was too expensive for  her.    Mood:  Duloxetine 30 mg daily    Notes she was in throws of diarrhea last time we talked. Is doing better today. Hasn't noticed a difference in mood yet. Still not having regular bowel movements. Feels like she is doing well with everything she has been through. Notes even through all the bad, she is always willing to smile. Notes she is always get a bit sad in the spring because her allergies make her miserable.    ----------------    I spent 49 minutes with this patient today. All changes were made via collaborative practice agreement with Vijaya Genao. A copy of the visit note was provided to the patient's provider(s).    A summary of these recommendations was sent via [employer].    Hattie Lazo PharmD, BCACP  MTM Pharmacist   [facility]   Phone: [phone]    Telemedicine Visit Details  Type of service:  Telephone visit  Start Time:  11:02 AM  End Time:  11:51 AM     Medication Therapy Recommendations  Small bowel tube feeding (H)    Rationale: Dosage form inappropriate - Ineffective medication - Effectiveness   Recommendation: Change Medication   Status: Resolved Med Access Issue            Hugo Rico MD [place], Lindsay Lundell, [facility] Hattie,    This is Dr. Rico, covering for Dr. Gomez. I'll send the two prescriptions to the [place] specialty pharmacy for the patient.    Hugo Rico MD  Internal Medicine  [facility], [place], [place]

## 2024-04-18 NOTE — PROGRESS NOTES
Dinora is a 59 year old who is being evaluated via a billable video visit.          Video-Visit Details    Type of service:  Video Visit   Originating Location (pt. Location): Home    Distant Location (provider location):  On-site  Platform used for Video Visit: Emeli  Signed Electronically by: Gloria Melissa MD      Video start 4/18/2024, 4:25:31 pm.  Video End 4/18/2024, 4:48:54 pm.    [facility]  Islet Autotransplant, Diabetes Follow Up    Problem List:  Patient Active Problem List   Diagnosis    Hypothyroidism    Need for prophylactic immunotherapy    Sprain and strain of other specified sites of hip and thigh    Chondromalacia of patella    Sensorineural hearing loss    Vertiginous syndrome and labyrinthine disorder    Lumbago    Allergic rhinitis due to other allergen    GERD (gastroesophageal reflux disease)    Other type of intractable migraine    History of ERCP    Abdominal pain    S/P ERCP    Post-pancreatectomy diabetes (H)    Pancreatic insufficiency    ACP (advance care planning)    Gastroparesis    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    Incisional hernia, without obstruction or gangrene    Adhesive capsulitis of shoulder, unspecified laterality    S/P hernia repair    Headache, chronic migraine without aura    Chronic pain syndrome    Iron deficiency anemia    Hypoglycemia    Adult failure to thrive    Abdominal pain, generalized    Gastrostomy tube obstruction (H)    Intra-abdominal abscess (H)    Postprocedural intraabdominal abscess    Acquired absence of spleen    Depressive disorder    Diabetes insipidus (H24)    Other specified abnormal immunological findings in serum    Poisoning by drug    Sphincter of Oddi dysfunction    Type 1 diabetes mellitus without complication (H)    Myofascial pain    Feeding intolerance    Acute postoperative abdominal pain    Major depression, single episode    History of food intolerance    Malnutrition, unspecified type (H24)     Nausea and vomiting, unspecified vomiting type    On total parenteral nutrition (TPN)    Fever, unspecified fever cause    Chronic pancreatitis, unspecified pancreatitis type (H)    Cholangitis (H28)    Abdominal pain, unspecified abdominal location    Bacteremia           Assessment:  1.  Post pancreatectomy diabetes mellitus, s/p total pancreatectomy and islet autotransplant.  (ICD-10 E89.1)  2.  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   Gastroparesis  4.  Nausea, vomiting      Dinora is a 59 year old with history of chronic pancreatitis who is s/p total pancreatectomy and islet autotransplant.  She has been on and off insulin with A1c in goal range in the past, but more recently with hyper and hypogylcemia.  Right now she is meeting targets for time in range, but does have some intermittent highs, and also lows at night.  Gastroparesis and need for tube feeds complicate the management of her diabetes.         Plan:  1.  Changes to current diabetes regimen:  Patient Instructions   1)  Keep the 7 units Levemir at beginning of feeds.    2) Try the protein powder in the J-tube that you have found helps with hypoglycemia at bedtime to see if that will help alleviate overnight lows.    3)  It would be better if you can get an option for compounded acetaminophen that is free of syrup/sugar.       Follow Up:  Sept 19 at 3:40pm - virtual      2.  Frequency of blood sugar checks:  Keep wearing Yandy-- this is much better for Dinora than fingersticks because with fingersticks we miss the really critical data of the post prandial excursions.    3.  Continue routine follow up for autoislet transplant patients:  Mixed meal test (6 mL/kg BoostHP to max of 360 mL) at 3 months, 6 months, and once a year post transplant.  Hemoglobin A1c levels at these time points and quarterly.    4.  Other issues  addressed today:  none    Follow up:  4 mos    Contact me for questions at [phone] or [phone].  Emergency number to reach pediatric endocrinology after hours is [phone].        Dr. Gloria Melissa MD  [facility] [place]  Director of Research, Islet Auto-transplant Program  Phone:  [phone]  Electronically signed: 2024 at 12:40 PM         Review of the result(s) of each unique test - HbA1c, jim  40 minutes spent on the date of the encounter doing chart review, history and exam, documentation, downloading and reviewing CGM data,  and further activities per the note    The longitudinal plan of care for the diagnosis(es)/condition(s) as documented were addressed during this visit. Due to the added complexity in care, I will continue to support Dinora in the subsequent management and with ongoing continuity of care.        HPI:  Dinora is a 59 year old  female here for follow up oflaparoscopic assisted total pancreatectomy, islet cell autotransplant, splenectomy, gastrojejunostomy tube revision, choledochoduodenostomy, duodenojejunostomy, Vera-Y reconstruction performed on 2016.  At the time of the procedure, the patient received:  Total Islet number: [id number].  Total Islet number/k.  Islet equivalents: [id number]  Islet equivalents/kilogram: 4091    Post-surgical course was complicated by two minor procedures for a retained foreign body near GJ site in 2017 and hernia repair 2018, and severe gastroparesis (refractory to J-feeds, domperidone).  She has been insulin independent since about 1 year after surgery.    - Gastroparesis is her major issue post TPIAT. Admitted multiple times. GJ was helpful for short time but did not tolerate well.  Has tried multiple other approaches and continues to worsen, nothing offering benefit.  Tried expanded access domperidone but not helpful  - Also has hypoglycemia (prior treatments SGLT2 inhibitor  not helpful, acarbose is helpful when eating, also uses mini glucagon).     At today's visit,   --  Medical management is still complicated by severe gastroparesis.  She has really been struggling recently and is a bit down understandably about being so insignificantly affected by gastroparesis with no real improvements.  -- Busy week- had port-a-cath placed and Jtube replacement procedures.  She was low to 31 mg/dL documented on one of these days, had reduced Levemir to half of her usual dose but her feeds were also held during that time.    --Tube feeds vital 1.5, 5 cartons per day, runs at about 100 mL per hour so about 12 hours on feeds.  -- BG seemed to do a bit better when we changed to Levemir rather than glargine but now I see she is having some lows again at night.  -- She notices:  Tylenol which she gets as an OTC syrup makes her BG high, and she notices that when she is having lows and not coming up well the protein powder into her tube helps.     Diabetes history:  Current insulin regimen:  Levemir 7 units  --> We changed to levemir to better match up with tube feed duration    For hypoglycemia she currently uses CGM for early detection plus mini dosing of glucagon as needed.     Wearing Yandy    TIR is 95% which is OK and avg of 116 is much improved since last visit, but my main concern is the 4% time in low, much of that at night               Recent hemoglobin A1c levels:      Lab Results   Component Value Date    A1C 5.6 03/20/2024    A1C 5.4 02/17/2022    A1C 5.2 11/27/2021    A1C 5.5 09/18/2021    A1C 5.4 08/05/2021    A1C 5.7 05/12/2021    A1C 5.9 04/01/2021    A1C 5.5 12/17/2020    A1C 5.8 07/30/2020         Hypoglycemia history: mild lows but rapid falls. The patient has had 0 episodes of severe hypoglycemia (seizure, coma, or neuroglycopenic symptoms severe enough to require assistance from another person).  Blood sugars were reviewed from the patient records and/or the meter download.           Review of systems:  A complete ROS is negative except as noted in HPI above.      Past Medical and Surgical History:  Past Medical History:   Diagnosis Date    Allergic rhinitis, cause unspecified     Allergy to other foods     strawberries, apples, celeries, alice, watermelon    Arthritis     left knee    Choledocholithiasis     long after cholecystectomy    Chronic abdominal pain     Chronic constipation     Chronic nausea     Chronic pancreatitis (H)     Degeneration of lumbar or lumbosacral intervertebral disc     Esophageal reflux     w/ hiatal hernia    Gastroparesis     Hiatal hernia     History of pituitary adenoma     s/p resection    Hypothyroidism     Migraines     Mild hyperlipidemia     On tube feeding diet     presence of GJ tube    Pancreatic disease     PD stricture, 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 endometriosis growth    APPENDECTOMY       SECTION  1993    COLONOSCOPY  2014    COLONOSCOPY N/A 2023    Procedure: COLONOSCOPY, WITH POLYPECTOMY AND BIOPSY;  Surgeon: Percy Chamorro MD;  Location:  GI    ENDOSCOPIC INSERTION TUBE GASTROSTOMY N/A 2021    Procedure: Gastrojejonostomy placement with jejunopexy, PEG tube exchange;  Surgeon: Zackery Montoya MD;  Location:  OR    ENDOSCOPIC RETROGRADE CHOLANGIOPANCREATOGRAM N/A 2015    Procedure: ENDOSCOPIC RETROGRADE CHOLANGIOPANCREATOGRAM;  Surgeon: Mandeep Park MD;  Location:  OR    ENDOSCOPIC RETROGRADE CHOLANGIOPANCREATOGRAM N/A 2016    Procedure: COMBINED ENDOSCOPIC RETROGRADE CHOLANGIOPANCREATOGRAPHY, PLACE TUBE/STENT;  Surgeon: Mandeep Park MD;  [facility]    ENDOSCOPIC RETROGRADE CHOLANGIOPANCREATOGRAM N/A 03/17/2016    Procedure: COMBINED ENDOSCOPIC RETROGRADE CHOLANGIOPANCREATOGRAPHY, REMOVE FOREIGN BODY OR STENT/TUBE;  Surgeon: Mandeep Park MD;  [facility]    ENDOSCOPIC RETROGRADE CHOLANGIOPANCREATOGRAM N/A 08/02/2016    Procedure: COMBINED ENDOSCOPIC RETROGRADE CHOLANGIOPANCREATOGRAPHY, PLACE TUBE/STENT;  Surgeon: Mandeep Park MD;  [facility]    ENDOSCOPIC RETROGRADE CHOLANGIOPANCREATOGRAM N/A 08/26/2016    Procedure: COMBINED ENDOSCOPIC RETROGRADE CHOLANGIOPANCREATOGRAPHY, REMOVE FOREIGN BODY OR STENT/TUBE;  Surgeon: Mandeep Park MD;  [facility]    ENDOSCOPIC ULTRASOUND UPPER GASTROINTESTINAL TRACT (GI) N/A 10/03/2016    Procedure: ENDOSCOPIC ULTRASOUND, ESOPHAGOSCOPY / UPPER GASTROINTESTINAL TRACT (GI);  Surgeon: Guru Jose Rodas MD;  [facility]: [facility] OR    ENT SURGERY  1989    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    ENTEROSCOPY SMALL BOWEL N/A 02/03/2022    Procedure: ENTEROSCOPY with possible fistula closure;  Surgeon: Francisco Dodson MD;  Location:  GI    ESOPHAGOSCOPY, GASTROSCOPY, DUODENOSCOPY (EGD), COMBINED N/A 06/24/2015    Procedure: COMBINED ESOPHAGOSCOPY, GASTROSCOPY, DUODENOSCOPY (EGD), REMOVE FOREIGN BODY;  Surgeon: Mandeep Park MD;  Location:  GI    ESOPHAGOSCOPY, GASTROSCOPY, DUODENOSCOPY (EGD), COMBINED N/A 10/25/2015    Procedure: COMBINED ESOPHAGOSCOPY, GASTROSCOPY, DUODENOSCOPY (EGD);  Surgeon: Sammy Amaro MD;  Location:  GI    ESOPHAGOSCOPY, GASTROSCOPY, DUODENOSCOPY (EGD), COMBINED N/A 10/25/2015    Procedure: COMBINED ESOPHAGOSCOPY, GASTROSCOPY, DUODENOSCOPY (EGD), BIOPSY SINGLE OR MULTIPLE;  Surgeon: Sammy Amaro MD;  Location:  GI    ESOPHAGOSCOPY, GASTROSCOPY, DUODENOSCOPY (EGD), COMBINED N/A 01/31/2023    Procedure: ESOPHAGOGASTRODUODENOSCOPY (EGD) with peg replacement ;  Surgeon: Mandeep Park MD;  Location:  OR     ESOPHAGOSCOPY, GASTROSCOPY, DUODENOSCOPY (EGD), DILATATION, COMBINED      EXCISE LESION TRUNK N/A 04/17/2017    Procedure: EXCISE LESION TRUNK;  Removal of Abdominal Foreign Body;  Surgeon: Nestor Phoenix MD;  [facility]    HC ESOPH/GAS REFLUX TEST W NASAL IMPED >1 HR N/A 11/19/2015    Procedure: ESOPHAGEAL IMPEDENCE FUNCTION TEST WITH 24 HOUR PH GREATER THAN 1 HOUR;  Surgeon: Thiago Apple MD;  [facility] GI    HC UGI ENDOSCOPY DIAG W BIOPSY  09/17/2008    HC UGI ENDOSCOPY DIAG W BIOPSY  09/27/2012    HC UGI ENDOSCOPY W ESOPHAGEAL DILATION BALLOON <30MM  09/17/2008    HC UGI ENDOSCOPY W EUS N/A 05/05/2015    Procedure: COMBINED ENDOSCOPIC ULTRASOUND, ESOPHAGOSCOPY, GASTROSCOPY, DUODENOSCOPY (EGD);  Surgeon: Wm Dueñas MD;  [facility]    HC WRIST ARTHROSCOP,RELEASE XVERS LIG Bilateral 12/17/2008    INJECT TRANSVERSUS ABDOMINIS PLANE (TAP) BLOCK BILATERAL Left 09/22/2016    Procedure: INJECT TRANSVERSUS ABDOMINIS PLANE (TAP) BLOCK BILATERAL;  Surgeon: Dickson Corrigan MD;  [facility]    INJECT TRIGGER POINT Bilateral 09/08/2022    Procedure: Abdominal trigger point injection with ultrasound;  Surgeon: Monika Mahajan MD;  [facility]    INJECT TRIGGER POINT SINGLE / MULTIPLE 3 OR MORE MUSCLES Right 11/15/2022    Procedure: Trigger point injections right abdomen with ultrasound guidance;  Surgeon: Monika Mahajan MD;  [facility]    IR CHEST PORT PLACEMENT < 5 YRS OF AGE  06/10/2022    IR CVC TUNNEL PLACEMENT > 5 YRS OF AGE  4/15/2024    IR PORT REMOVAL RIGHT  11/09/2023    laparoscopic pineda  01/01/1995    LAPAROSCOPIC HERNIORRHAPHY INCISIONAL N/A 08/23/2018    Procedure: LAPAROSCOPIC HERNIORRHAPHY INCISIONAL;  Laparoscopic Incisional Hernia Repair with Symbotex Mesh Implant;  Surgeon: Nestor Phoenix MD;  [facility]    LAPAROSCOPIC PANCREATECTOMY, TRANSPLANT AUTO ISLET CELL N/A 12/28/2016    Procedure: LAPAROSCOPIC PANCREATECTOMY, TRANSPLANT AUTO ISLET CELL;   Surgeon: Nestor Phoenix MD;  [facility]    LAPAROTOMY EXPLORATORY N/A 01/31/2023    Procedure: Exploratory Laparotomy, lysis of adhesions, Perforated J-Junostomy Resection, Replace J-Junostomy site;  Surgeon: Elver Bauer MD;  [facility]    LAPAROTOMY, LYSIS ADHESIONS, COMBINED N/A 01/31/2023    Procedure: Dilatation of jejunostomy feeding tube, track with placement of jejunostomy tube with fluoroscopy;  Surgeon: Elver Bauer MD;  [facility]    PICC DOUBLE LUMEN PLACEMENT Left 11/13/2023    Basilic Vein 5F DL 43 cm    REMOVE AND REPLACE BREAST IMPLANT PROSTHESIS N/A 12/30/2022    Procedure: Exploratory Laparotomy, lysis of adhesions, small bowel resection. Placement of gastric jejunostomy for enteral feeding.;  Surgeon: Elver Bauer MD;  [facility]    REMOVE GASTROSTOMY TUBE ADULT N/A 01/28/2022    Procedure: REMOVAL, GASTROSTOMY TUBE, ADULT;  Surgeon: Mandeep Park MD;  [facility]    REPLACE GASTROJEJUNOSTOMY TUBE, PERCUTANEOUS N/A 09/07/2021    Procedure: GASTROJEJUNOSTOMY TUBE PLACEMENT, PERCUTANEOUS, WITH GASTROPEXY;  Surgeon: Mandeep Park MD;  [facility]    REPLACE GASTROJEJUNOSTOMY TUBE, PERCUTANEOUS N/A 09/22/2021    Procedure: REPLACEMENT, GASTROJEJUNOSTOMY TUBE, PERCUTANEOUS;  Surgeon: Zackery Montoya MD;  [facility]    REPLACE GASTROJEJUNOSTOMY TUBE, PERCUTANEOUS N/A 11/22/2022    Procedure: REPLACEMENT, GASTROJEJUNOSTOMY TUBE, PERCUTANEOUS;  Surgeon: Mandeep Park MD;  [facility]    REPLACE GASTROJEJUNOSTOMY TUBE, PERCUTANEOUS N/A 12/09/2022    Procedure: REPLACEMENT, GASTROJEJUNOSTOMY TUBE, PERCUTANEOUS with ENDOSCOPIC SUTURING.;  Surgeon: Mandeep Park MD;  [facility]    REPLACE GASTROJEJUNOSTOMY TUBE, PERCUTANEOUS N/A 08/01/2023    Procedure: Replace Gastrojejunostomy Tube, Percutaneous;  Surgeon: Mandeep Park MD;  [facility]    REPLACE GASTROJEJUNOSTOMY TUBE,  PERCUTANEOUS N/A 2023    Procedure: Replace Gastrojejunostomy Tube, Percutaneous;  Surgeon: Francisco Dodson MD;  [facility]    REPLACE GASTROJEJUNOSTOMY TUBE, PERCUTANEOUS N/A 2023    Procedure: Replace Gastrojejunostomy Tube, Percutaneous;  Surgeon: Mandeep Park MD;  [facility]    REPLACE JEJUNOSTOMY TUBE, PERCUTANEOUS N/A 09/10/2021    Procedure: UPPER ENDOSCOPY, REPLACEMENT OF PERCUTANEOUS GASTROJEJUNOSTOMY TUBE, T-TAG GASTROPEXY;  Surgeon: Zackery Montoya MD;  [facility]    REPLACE JEJUNOSTOMY TUBE, PERCUTANEOUS N/A 2021    Procedure: REPLACEMENT, JEJUNOSTOMY TUBE, PERCUTANEOUS;  Surgeon: Mandeep Park MD;  [facility]    REPLACE JEJUNOSTOMY TUBE, PERCUTANEOUS N/A 2023    Procedure: REPLACEMENT, JEJUNOSTOMY TUBE, PERCUTANEOUS;  Surgeon: Mandeep Park MD;  [facility]    REPLACE JEJUNOSTOMY TUBE, PERCUTANEOUS  2023    Procedure: Replace Jejunostomy Tube, Percutaneous;  Surgeon: Mandeep Park MD;  [facility]    REPLACE JEJUNOSTOMY TUBE, PERCUTANEOUS N/A 2024    Procedure: REPLACEMENT, JEJUNOSTOMY TUBE, PERCUTANEOUS;  Surgeon: Francisco Dodson MD;  [facility]    transphenoidal pituitary resection  1990    [facility]  DELIVERY ONLY  1996    [facility]  DELIVERY ONLY  1998    repeat c section with incidental cystotomy with repair    [facility] EXCIS PITUITARY,TRANSNASAL/SEPTAL  1980    pituitary tumor removed for diabetes insipidus    [facility] TOTAL ABDOM HYSTERECTOMY  1999    w/ bilateral salpingoophorectomy        Family History:  New changes since last visit:  none  Family History   Problem Relation Age of Onset    Lipids Mother     Hypertension Mother     Thyroid Disease Mother     Depression Mother     Angina Mother     GERD Mother     Skin Cancer Mother     Migraines Mother     Autoimmune Disease Mother     Hyperlipidemia Mother     Mental Illness Mother     Cerebrovascular Disease  Mother         11/2023    Other Cancer Mother         skin-basil cell    Anxiety Disorder Mother     Eye Disorder Father         cataract, detached retina    Myocardial Infarction Father 60    Lipids Father     Cerebrovascular Disease Father     Depression Father     Substance Abuse Father     Anesthesia Reaction Father         stroke right after surgery    Cataracts Father     Osteoarthritis Father     Ulcerative Colitis Father     Autoimmune Disease Father     Heart Disease Father     Hyperlipidemia Father     Mental Illness Father     Other Cancer Father         myloma    Anxiety Disorder Father     Interpersonal Violence Sister     Coronary Artery Disease Sister         angioplasty    GERD Sister     Substance Abuse Sister     Cerebrovascular Disease Sister         2005    Depression Sister     Thyroid Disease Sister     Eye Disorder Maternal Grandmother         cataract    Thyroid Disease Maternal Grandmother     Diabetes Maternal Grandfather     Eye Disorder Paternal Grandmother         cataract    Diabetes Paternal Grandmother     Eye Disorder Paternal Grandfather         cataract    Diabetes Paternal Grandfather     Substance Abuse Paternal Grandfather     Eye Disorder Son         ptosis    Depression Son     Anxiety Disorder Son     Heart Disease Paternal Aunt     Diabetes Paternal Aunt     Diabetes Paternal Uncle     Heart Disease Paternal Uncle     Depression Nephew     Anxiety Disorder Nephew     Thyroid Disease Nephew     Diabetes Type 2  Cousin         paternal cousin    Autoimmune Disease Cousin     Autoimmune Disease Sister     Depression Sister     Mental Illness Sister     Substance Abuse Sister     Thyroid Disease Sister     Depression Son     Mental Illness Son     Anxiety Disorder Son     Thyroid Disease Nephew     Anxiety Disorder Nephew        Social History:  Social History     Social History Narrative     with 3 children and a dog.  No smoking, etoh or drug use.  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  BMI= There is no height or weight on file to calculate BMI.  General:  Appearance is normal, no acute distress  HEENT:  NC/AT, sclera appear white  Neck:  No obvious thyromegaly  CV/Lungs:  Non distressed breathing  Skin:  No apparent rashes  Neuro:  Normal mental status  Psych:  Normal affect

## 2024-04-19 DIAGNOSIS — F43.21 SITUATIONAL DEPRESSION: ICD-10-CM

## 2024-04-19 DIAGNOSIS — E03.9 HYPOTHYROIDISM, UNSPECIFIED TYPE: Primary | ICD-10-CM

## 2024-04-19 NOTE — PATIENT INSTRUCTIONS
"Recommendations from today's MTM visit:                                                       Hattie to connect with Dr. Gomez on the following:    - levothyroxine: switching to an oral solution    - duloxetine: switching to the sprinkle caps    2. Dinora to follow-up on the following:   - Montelukast: ask Dr. Andrade to change the order to chewable tablets   - Baclofen: follow-up with the pain clinic to discuss switching your order to liquid    3. Update: Will connect with compounding about what they were running for amitriptyline (crushed tablets versus bulk powder).   -- message sent to compounding team    Follow-up:    -- will follow-up via [employer] message with updates   -- 9:00 AM 5/8 for a telephone call    It was great speaking with you today.  I value your experience and would be very thankful for your time in providing feedback in our clinic survey. In the next few days, you may receive an email or text message from [employer] with a link to a survey related to your  clinical pharmacist.\"     To schedule another MTM appointment, please call the clinic directly or you may call the MTM scheduling line at [phone].    My Clinical Pharmacist's contact information:                                                      Please feel free to contact me with any questions or concerns you have.      Hattie VanceD, BCACP  MTM Pharmacist   [facility]   Phone: [phone]    "

## 2024-04-22 ENCOUNTER — MYC REFILL (OUTPATIENT)
Dept: INTERNAL MEDICINE | Facility: CLINIC | Age: 59
End: 2024-04-22
Payer: COMMERCIAL

## 2024-04-22 DIAGNOSIS — Z87.19 HISTORY OF FOOD INTOLERANCE: ICD-10-CM

## 2024-04-22 DIAGNOSIS — R62.7 ADULT FAILURE TO THRIVE: ICD-10-CM

## 2024-04-22 DIAGNOSIS — E46 MALNUTRITION, UNSPECIFIED TYPE (H): ICD-10-CM

## 2024-04-25 DIAGNOSIS — G43.709 CHRONIC MIGRAINE W/O AURA W/O STATUS MIGRAINOSUS, NOT INTRACTABLE: Primary | ICD-10-CM

## 2024-04-25 PROCEDURE — 99207 PR SATISFY VISIT NUMBER: CPT | Performed by: NURSE PRACTITIONER

## 2024-04-26 ENCOUNTER — DOCUMENTATION ONLY (OUTPATIENT)
Dept: INTERNAL MEDICINE | Facility: CLINIC | Age: 59
End: 2024-04-26
Payer: COMMERCIAL

## 2024-04-26 ASSESSMENT — ANXIETY QUESTIONNAIRES
2. NOT BEING ABLE TO STOP OR CONTROL WORRYING: SEVERAL DAYS
5. BEING SO RESTLESS THAT IT IS HARD TO SIT STILL: SEVERAL DAYS
3. WORRYING TOO MUCH ABOUT DIFFERENT THINGS: SEVERAL DAYS
GAD7 TOTAL SCORE: 8
1. FEELING NERVOUS, ANXIOUS, OR ON EDGE: MORE THAN HALF THE DAYS
6. BECOMING EASILY ANNOYED OR IRRITABLE: SEVERAL DAYS
7. FEELING AFRAID AS IF SOMETHING AWFUL MIGHT HAPPEN: SEVERAL DAYS
7. FEELING AFRAID AS IF SOMETHING AWFUL MIGHT HAPPEN: SEVERAL DAYS
8. IF YOU CHECKED OFF ANY PROBLEMS, HOW DIFFICULT HAVE THESE MADE IT FOR YOU TO DO YOUR WORK, TAKE CARE OF THINGS AT HOME, OR GET ALONG WITH OTHER PEOPLE?: SOMEWHAT DIFFICULT
GAD7 TOTAL SCORE: 8
IF YOU CHECKED OFF ANY PROBLEMS ON THIS QUESTIONNAIRE, HOW DIFFICULT HAVE THESE PROBLEMS MADE IT FOR YOU TO DO YOUR WORK, TAKE CARE OF THINGS AT HOME, OR GET ALONG WITH OTHER PEOPLE: SOMEWHAT DIFFICULT
4. TROUBLE RELAXING: SEVERAL DAYS

## 2024-04-29 ENCOUNTER — VIRTUAL VISIT (OUTPATIENT)
Dept: ONCOLOGY | Facility: CLINIC | Age: 59
End: 2024-04-29
Attending: SOCIAL WORKER
Payer: COMMERCIAL

## 2024-04-29 DIAGNOSIS — F43.23 ADJUSTMENT DISORDER WITH MIXED ANXIETY AND DEPRESSED MOOD: Primary | ICD-10-CM

## 2024-04-29 DIAGNOSIS — Z53.9 DIAGNOSIS NOT YET DEFINED: Primary | ICD-10-CM

## 2024-04-29 PROCEDURE — 90837 PSYTX W PT 60 MINUTES: CPT | Mod: 95 | Performed by: SOCIAL WORKER

## 2024-04-29 NOTE — PROGRESS NOTES
Type of Form Received: Plan of Care    Form Received (Date) 4/26/24   Form Filled out Yes, faxed 4/30/24   Placed in provider folder Yes

## 2024-04-29 NOTE — LETTER
"    4/29/2024         RE: Dinora Mcghee  [address] [facility] [address]        Dear Colleague,    Thank you for referring your patient, Dinora Mcghee, to the [facility] CANC[facility]. Please see a copy of my visit note below.    Virtual Visit Details    Type of service:  Video Visit     Originating Location (pt. Location): Home    Distant Location (provider location):  On-site  Platform used for Video Visit: [facility]        Palliative Care Clinical Social Work Return Appointment    PLEASE NOTE:  THIS IS A MENTAL HEALTH NOTE.  OTHER PROVIDERS VIEWING THIS NOTE SHOULD USE THIS ONLY FOR UNDERSTANDING THE CONTEXT OF THE PATIENT'S EXPERIENCE.  TOPICS DESCRIBED IN THIS NOTE SHOULD NOT BE REFERENCED TO THE PATIENT BY MEDICAL PROVIDERS.    Dinora Mcghee is a 58  year old woman with multiple medical conditions including chronic pancreatitis s/p TPAIT, long term complications from gastroparesis, constipation, GERD, migraines.  Had GJ placed Sept 2021, recovery was complicated and she ended up with a venting g-tube.  Due to complications with PO tolerance she had surgical placement of a J tube, and a resection of densely adhered small bowel.  Started on tube feedings, then on TPN.  TPN eventually stopped due to infection.  Has heavy symptom burden that includes chronic nausea and vomiting, fatigue, complex medical management.  Has had multiple ER visits for GI and Bowel concerns.  Seen today for counseling for adjustment disorder with depressed mood and anxiety.          Mental Status Exam:(List all that apply)      Appearance: Appropriate      Eye Contact: Good       Orientation: Yes, x4      Mood: Normal       Affect: Appropriate       Thought Content: Clear         Thought Form: Logical      Psychomotor Behavior: Normal    Visit themes:  grief, demoralization, depressed mood    Adjustment to Illness:  Seen by GI \"he promises remission\" going to colorectal surg in a few weeks.  In addition to " "symptom burden from GI issues, has allergies and so eyes are swollen and vision is affected.     Symptom burden remains heavy and connecting with sources of meaning feels challenging in light of feeling so poorly most of the time         Mental Health (thoughts, feelings, actions, coping, and symptoms):     Started cymbalta -- seeing GP to follow up on this.  Has not felt much difference in mood.  Discussed grief vs depression and demoralization     Discussed losses related to illness, in particular the loss of the ability to reliably make plans because it is impossible to predict how she will feel on any given day.  Had to back out of going to see a show with her  and some friends because of fatigue and GI symptoms.      \"I'm not suicidal, but I also don't know if I want to keep living like this. The quality of life is so little\"     We discussed revisiting MCP.  No plan in place at this time.         Helpful activities: time with family.  Meditating, knitting gifts for family members and friends, tapping.  Uses EMDR techniques. Trips that she is looking forward to.         Helpful cognitions:     Unhelpful and/or triggering or exacerbating factors:     Body-Mind Skills Education: uses tapping and EMDR     Relationships:  and children, extended family    End of Life and Advance Care Planning:     Main therapeutic interventions provided this session include:     Provide psychoeducation, Facilitate processing of thoughts and feelings, and Facilitate goals of care discussion    Plan:  Will return for psychotherapy with palliative care focus in 2 weeks        Time spent with patient/family:   60 (Start 3:00 end 4:00)    JAIDA Nguyen, [facility]   Palliative Care Clinical   Ph: [phone]      DO NOT SEND ANY LETTERS                Again, thank you for allowing me to participate in the care of your patient.        Sincerely,        Sweta Ghosh [facility]  "

## 2024-04-29 NOTE — PROGRESS NOTES
"Virtual Visit Details    Type of service:  Video Visit     Originating Location (pt. Location): Home    Distant Location (provider location):  On-site  Platform used for Video Visit: [facility]        Palliative Care Clinical Social Work Return Appointment    PLEASE NOTE:  THIS IS A MENTAL HEALTH NOTE.  OTHER PROVIDERS VIEWING THIS NOTE SHOULD USE THIS ONLY FOR UNDERSTANDING THE CONTEXT OF THE PATIENT'S EXPERIENCE.  TOPICS DESCRIBED IN THIS NOTE SHOULD NOT BE REFERENCED TO THE PATIENT BY MEDICAL PROVIDERS.    Dinora Mcghee is a 58  year old woman with multiple medical conditions including chronic pancreatitis s/p TPAIT, long term complications from gastroparesis, constipation, GERD, migraines.  Had GJ placed Sept 2021, recovery was complicated and she ended up with a venting g-tube.  Due to complications with PO tolerance she had surgical placement of a J tube, and a resection of densely adhered small bowel.  Started on tube feedings, then on TPN.  TPN eventually stopped due to infection.  Has heavy symptom burden that includes chronic nausea and vomiting, fatigue, complex medical management.  Has had multiple ER visits for GI and Bowel concerns.  Seen today for counseling for adjustment disorder with depressed mood and anxiety.          Mental Status Exam:(List all that apply)      Appearance: Appropriate      Eye Contact: Good       Orientation: Yes, x4      Mood: Normal       Affect: Appropriate       Thought Content: Clear         Thought Form: Logical      Psychomotor Behavior: Normal    Visit themes:  grief, demoralization, depressed mood    Adjustment to Illness:  Seen by GI \"he promises remission\" going to colorectal surg in a few weeks.  In addition to symptom burden from GI issues, has allergies and so eyes are swollen and vision is affected.     Symptom burden remains heavy and connecting with sources of meaning feels challenging in light of feeling so poorly most of the time         Mental Health (thoughts, " "feelings, actions, coping, and symptoms):     Started cymbalta -- seeing GP to follow up on this.  Has not felt much difference in mood.  Discussed grief vs depression and demoralization     Discussed losses related to illness, in particular the loss of the ability to reliably make plans because it is impossible to predict how she will feel on any given day.  Had to back out of going to see a show with her  and some friends because of fatigue and GI symptoms.      \"I'm not suicidal, but I also don't know if I want to keep living like this. The quality of life is so little\"     We discussed revisiting MCP.  No plan in place at this time.         Helpful activities: time with family.  Meditating, knitting gifts for family members and friends, tapping.  Uses EMDR techniques. Trips that she is looking forward to.         Helpful cognitions:     Unhelpful and/or triggering or exacerbating factors:     Body-Mind Skills Education: uses tapping and EMDR     Relationships:  and children, extended family    End of Life and Advance Care Planning:     Main therapeutic interventions provided this session include:     Provide psychoeducation, Facilitate processing of thoughts and feelings, and Facilitate goals of care discussion    Plan:  Will return for psychotherapy with palliative care focus in 2 weeks        Time spent with patient/family:   60 (Start 3:00 end 4:00)    JAIDA Nguyen, [facility]   Palliative Care Clinical   Ph: [phone]      DO NOT SEND ANY LETTERS              "

## 2024-04-29 NOTE — LETTER
"    4/29/2024         RE: Dinora Mcghee  [address] [facility] [address]        Dear Colleague,    Thank you for referring your patient, Dinora Mcghee, to the [facility]. Please see a copy of my visit note below.    Virtual Visit Details    Type of service:  Video Visit     Originating Location (pt. Location): Home    Distant Location (provider location):  On-site  Platform used for Video Visit: [facility]        Palliative Care Clinical Social Work Return Appointment    PLEASE NOTE:  THIS IS A MENTAL HEALTH NOTE.  OTHER PROVIDERS VIEWING THIS NOTE SHOULD USE THIS ONLY FOR UNDERSTANDING THE CONTEXT OF THE PATIENT'S EXPERIENCE.  TOPICS DESCRIBED IN THIS NOTE SHOULD NOT BE REFERENCED TO THE PATIENT BY MEDICAL PROVIDERS.    Dinora Mcghee is a 58  year old woman with multiple medical conditions including chronic pancreatitis s/p TPAIT, long term complications from gastroparesis, constipation, GERD, migraines.  Had GJ placed Sept 2021, recovery was complicated and she ended up with a venting g-tube.  Due to complications with PO tolerance she had surgical placement of a J tube, and a resection of densely adhered small bowel.  Started on tube feedings, then on TPN.  TPN eventually stopped due to infection.  Has heavy symptom burden that includes chronic nausea and vomiting, fatigue, complex medical management.  Has had multiple ER visits for GI and Bowel concerns.  Seen today for counseling for adjustment disorder with depressed mood and anxiety.          Mental Status Exam:(List all that apply)      Appearance: Appropriate      Eye Contact: Good       Orientation: Yes, x4      Mood: Normal       Affect: Appropriate       Thought Content: Clear         Thought Form: Logical      Psychomotor Behavior: Normal    Visit themes:  grief, demoralization, depressed mood    Adjustment to Illness:  Seen by GI \"he promises remission\" going to colorectal surg in a few weeks.  In addition to " "symptom burden from GI issues, has allergies and so eyes are swollen and vision is affected.     Symptom burden remains heavy and connecting with sources of meaning feels challenging in light of feeling so poorly most of the time         Mental Health (thoughts, feelings, actions, coping, and symptoms):     Started cymbalta -- seeing GP to follow up on this.  Has not felt much difference in mood.  Discussed grief vs depression and demoralization     Discussed losses related to illness, in particular the loss of the ability to reliably make plans because it is impossible to predict how she will feel on any given day.  Had to back out of going to see a show with her  and some friends because of fatigue and GI symptoms.      \"I'm not suicidal, but I also don't know if I want to keep living like this. The quality of life is so little\"     We discussed revisiting MCP.  No plan in place at this time.         Helpful activities: time with family.  Meditating, knitting gifts for family members and friends, tapping.  Uses EMDR techniques. Trips that she is looking forward to.         Helpful cognitions:     Unhelpful and/or triggering or exacerbating factors:     Body-Mind Skills Education: uses tapping and EMDR     Relationships:  and children, extended family    End of Life and Advance Care Planning:     Main therapeutic interventions provided this session include:     Provide psychoeducation, Facilitate processing of thoughts and feelings, and Facilitate goals of care discussion    Plan:  Will return for psychotherapy with palliative care focus in 2 weeks        Time spent with patient/family:   60 (Start 3:00 end 4:00)    JAIDA Nguyen, [facility]   Palliative Care Clinical   Ph: [phone]      DO NOT SEND ANY LETTERS                Again, thank you for allowing me to participate in the care of your patient.        Sincerely,        Sweta Ghosh [facility]  "

## 2024-04-30 ENCOUNTER — TRANSFERRED RECORDS (OUTPATIENT)
Dept: HEALTH INFORMATION MANAGEMENT | Facility: CLINIC | Age: 59
End: 2024-04-30
Payer: COMMERCIAL

## 2024-05-01 ENCOUNTER — VIRTUAL VISIT (OUTPATIENT)
Dept: INTERNAL MEDICINE | Facility: CLINIC | Age: 59
End: 2024-05-01
Payer: COMMERCIAL

## 2024-05-01 ENCOUNTER — TELEPHONE (OUTPATIENT)
Dept: INTERNAL MEDICINE | Facility: CLINIC | Age: 59
End: 2024-05-01

## 2024-05-01 DIAGNOSIS — F43.21 SITUATIONAL DEPRESSION: ICD-10-CM

## 2024-05-01 DIAGNOSIS — H10.10 SEASONAL ALLERGIC CONJUNCTIVITIS: ICD-10-CM

## 2024-05-01 PROCEDURE — 99213 OFFICE O/P EST LOW 20 MIN: CPT | Mod: 95 | Performed by: INTERNAL MEDICINE

## 2024-05-01 RX ORDER — CETIRIZINE HYDROCHLORIDE 10 MG/1
10 TABLET ORAL 2 TIMES DAILY
Qty: 60 TABLET | Refills: 11 | Status: SHIPPED | OUTPATIENT
Start: 2024-05-01

## 2024-05-01 ASSESSMENT — PATIENT HEALTH QUESTIONNAIRE - PHQ9: SUM OF ALL RESPONSES TO PHQ QUESTIONS 1-9: 9

## 2024-05-01 NOTE — PROGRESS NOTES
Dinora is a 59 year old who is being evaluated via a billable video visit.    How would you like to obtain your AVS? MyChart  If the video visit is dropped, the invitation should be resent by: Text to cell phone: [phone]  Will anyone else be joining your video visit? No      Are refills needed on medications prescribed by this physician? YES  DULoxetine (prescribed by Dr. Rico); pt also requests a protein supplement called PROSOURCE (PS) for her feeding tube      Tiffronnie Sawant VVF      Assessment & Plan     Situational depression    Dinora reports no improvement since starting the 30mg of duloxetine, but is tolerating this okay, so we'll increase to 60mg.  She follows with a therapist.  We'll follow-up in 6-8 weeks to see how the dose increase is going.     - DULoxetine HCl 60 MG CSDR; 1 capsule (60 mg) by Per J Tube route daily Sprinkle caps for feeding tube    Seasonal allergic conjunctivitis    Dinora's allergy symptoms are not controlled on Zyrtec, Singulair, Benadryl BID, Astelin and Zaditor.  We'll try increasing the Zyrtec to BID to see if that helps.     - cetirizine (ZYRTEC) 10 MG tablet; 1 tablet (10 mg) by Per G Tube route 2 times daily      Subjective   Dinora is a 59 year old, presenting for the following health issues:  RECHECK and MH Follow Up    Via the Health Maintenance questionnaire, the patient has reported the following services have been completed -Eye Exam, this information has been sent to the abstraction team.  History of Present Illness       Mental Health Follow-up:  Patient presents to follow-up on Depression & Anxiety.Patient's depression since last visit has been:  No change  The patient is not having other symptoms associated with depression.  Patient's anxiety since last visit has been:  No change  The patient is having other symptoms associated with anxiety.  Any significant life events: financial concerns, grief or loss and health concerns  Patient is feeling anxious or  having panic attacks.  Patient has no concerns about alcohol or drug use.    She eats 0-1 servings of fruits and vegetables daily.She consumes 0 sweetened beverage(s) daily.She exercises with enough effort to increase her heart rate 20 to 29 minutes per day.  She exercises with enough effort to increase her heart rate 3 or less days per week.   She is taking medications regularly.       I saw Dinora last on 3/20 for a pre-op for a tunneled line placement, which was done on 4/15.  She ended up having her J and G tube replaced on 4/16 as well. She was having ongoing depression symptoms related to her chronic health conditions, and we started duloxetine to see if that would help both with mood and pain.  She has been seeing a therapist.      Today, Dinora reports no change despite the improvement in the PHQ9 score below.  Feeling sad, frustrated about having to miss events.  Seems to be sleeping better.  No side effects.  Feels she hasn't been doing great since having the procedures noted above.  Still having some bleeding around the G tube and a lot of pain.  The tunneled catheter is much better than the PICC though.  She was not able to attend the performance they had tickets for her 's birthday.  Her allergies are acting up a lot, eyes are very red and sore.  She is taking Zyrtec, Singulair, Benadryl BID, Astelin and Zaditor.      She has had low blood sugars down to 53 overnight.  She is working with Dr. Melissa in endocrinology on this.          12/7/2023     1:01 PM 3/19/2024     8:41 AM 5/1/2024     2:54 PM   PHQ   PHQ-9 Total Score 18 17 9   Q9: Thoughts of better off dead/self-harm past 2 weeks Not at all Several days Not at all   F/U: Thoughts of suicide or self-harm  No    F/U: Safety concerns  No          12/3/2022    11:48 AM 11/29/2023     3:02 PM 4/26/2024    11:16 AM   DARCY-7 SCORE   Total Score 6 (mild anxiety)  8 (mild anxiety)   Total Score 6 8 8           Objective    Vitals - Patient  "Reported  Systolic (Patient Reported): 114  Diastolic (Patient Reported): 75  Weight (Patient Reported): 56.2 kg (124 lb)  Height (Patient Reported): 172.7 cm (5' 8\")  BMI (Based on Pt Reported Ht/Wt): 18.85  Pain Score: Extreme Pain (8) (J tube site)  Pain Loc: Abdomen      Vitals:  No vitals were obtained today due to virtual visit.    Physical Exam   GENERAL: alert and no distress  EYES: Eyes red.  No discharge or erythema, or obvious scleral/conjunctival abnormalities.  RESP: No audible wheeze, cough, or visible cyanosis.    SKIN: Visible skin clear. No significant rash, abnormal pigmentation or lesions.  NEURO: Cranial nerves grossly intact.  Mentation and speech appropriate for age.  PSYCH: mentation appears normal and affect flat        Video-Visit Details    Type of service:  Video Visit   Start time: 305pm  End time: 316pm  Originating Location (pt. Location): Home    Distant Location (provider location):  On-site  Platform used for Video Visit: [employer]  Signed Electronically by: Juan Jose Gomez MD    "

## 2024-05-01 NOTE — PATIENT INSTRUCTIONS
Thank you for visiting the Primary Care Center today at the [facility]! The following is some information about our clinic:     Primary Care Center Frequently-Asked Questions    (1) How do I schedule appointments at the [facility]?     Primary Care--to schedule or make changes to an existing appointment, please call our primary care line at [phone].    Labs--to schedule a lab appointment at the [facility] you can use [employer] or call [phone]. If you have a [place] location that is closer to home, you can reach out to that location for scheduling options.     Imaging--if you need to schedule a CT, X-ray, MRI, ultrasound, or other imaging study you can call [phone] to schedule at the [facility] or any other [facility] imaging location.     Referrals--if a referral to another specialty was ordered you can expect a phone call from their scheduling team. If you have not heard from them in a week, please call us or send us a [employer] message to check the status or get a scheduling number. Please note that this only applies to internal [facility] referrals. If the referral is external you would need to contact their office for scheduling.     (2) I have a question about my visit, who do I contact?     You can call us at the primary care line at [phone] to ask questions about your visit. You can also send a secure message through [employer], which is reviewed by clinic staff. Please note that [employer] messages have a twenty-four to forty-eight business hour turnaround time and should not be used for urgent concerns.    (3) How will I get the results of my tests?    If you are signed up for [employer] all tests will be released to you within twenty-four hours of resulting. Please allow three to five days for your doctor to review your results and place a note interpreting the results. If you do not have [employer] you will receive your  results through mail seven to ten business days following the return of the tests. Please note that if there should be any urgent or concerning results that your doctor or their registered nurse will reach out to you the same day as the tests come back. If you have follow up questions about your results or would like to discuss the results in detail please schedule a follow up with your provider either in person or virtually.     (4) How do I get refills of my prescriptions?     You should always first contact your pharmacy for refills of your medications. If submitting a refill request on [employer], please be sure to submit the request only once--repeat requests can cause delays in refill. If you are requesting a NEW medication or a medication related to new symptoms you will need to schedule an appointment with a provider prior to approval. Please note: Routine medication refills have up to one to three business day turnaround whereas controlled substances refills have up to five to seven business day turnaround.    (5) I have new symptoms, what do I do?     If you are having an immediate medical emergency, you should dial 911 for assistance.   For anything urgent that needs to be seen within a few hours to one day you should visit a local urgent care for assistance.  For non-urgent symptoms that need to be seen within a few days to a week you can schedule with an available provider in primary care by going to [email or web address] or calling [phone].   If you are not sure how serious your symptoms are or you would like to receive medical advice you can always call [phone] to speak with a triage nurse.

## 2024-05-01 NOTE — NURSING NOTE
Depression Response    Patient completed the PHQ-9 assessment for depression and scored >9? Yes  Question 9 on the PHQ-9 was positive for suicidality? No  Does patient have current mental health provider? Yes    Is this a virtual visit? Yes   Does patient have suicidal ideation (positive question 9)? No - offer to place Mental Health Referral.  Patient declined referral/not needed    I personally notified the following: visit provider

## 2024-05-01 NOTE — TELEPHONE ENCOUNTER
Patient confirmed scheduled appointment:  Date: 6/13  Time: 10a  Visit type: virtual rtn  Provider: claudia

## 2024-05-04 ENCOUNTER — TELEPHONE (OUTPATIENT)
Dept: INTERNAL MEDICINE | Facility: CLINIC | Age: 59
End: 2024-05-04
Payer: COMMERCIAL

## 2024-05-04 NOTE — TELEPHONE ENCOUNTER
Retail Pharmacy Prior Authorization Team   Phone: [phone]      EPA DENIED: WAITING ON DENIAL LETTER

## 2024-05-06 ENCOUNTER — TELEPHONE (OUTPATIENT)
Dept: ENDOCRINOLOGY | Facility: CLINIC | Age: 59
End: 2024-05-06
Payer: COMMERCIAL

## 2024-05-06 DIAGNOSIS — R10.84 ABDOMINAL PAIN, GENERALIZED: ICD-10-CM

## 2024-05-06 DIAGNOSIS — K31.84 GASTROPARESIS: ICD-10-CM

## 2024-05-06 DIAGNOSIS — E16.2 HYPOGLYCEMIA: ICD-10-CM

## 2024-05-06 NOTE — TELEPHONE ENCOUNTER
Hello pt is requesting the glucagon emergency 1 mg kit and I do not see this on the med list can we please get this sent back over please and thank you

## 2024-05-07 NOTE — TELEPHONE ENCOUNTER
PRIOR AUTHORIZATION DENIED    Medication: CETIRIZINE HCL 10 MG PO TABS  Insurance Company: [employer] [place] - Phone [phone] Fax [phone]  Denial Date: 5/3/2024  Denial Reason(s):     Appeal Information:     Patient Notified: No

## 2024-05-08 ENCOUNTER — VIRTUAL VISIT (OUTPATIENT)
Dept: GASTROENTEROLOGY | Facility: CLINIC | Age: 59
End: 2024-05-08
Attending: INTERNAL MEDICINE
Payer: COMMERCIAL

## 2024-05-08 DIAGNOSIS — Z93.4 SMALL BOWEL TUBE FEEDING (H): ICD-10-CM

## 2024-05-08 DIAGNOSIS — K31.84 GASTROPARESIS: ICD-10-CM

## 2024-05-08 DIAGNOSIS — K59.00 CONSTIPATION, UNSPECIFIED CONSTIPATION TYPE: ICD-10-CM

## 2024-05-08 DIAGNOSIS — R10.84 ABDOMINAL PAIN, GENERALIZED: Primary | ICD-10-CM

## 2024-05-08 NOTE — PROGRESS NOTES
Medication Therapy Management (MTM) Encounter    ASSESSMENT:                            Medication Adherence/Access: See below for considerations  -- levothyroxine : (oral solution) improved  -- duloxetine: (sprinkle caps) improved   -- montelukast: chewable tablets (through Dr. Andrade) -- perhaps less relevant at this time  -- baclofen: liquid (through pain management) -- in progress  -- amitriptyline compound: improved  -- sugar free acetaminophen: needs product  -- pink lady enemas: too expensive, need alternate option    Gastroparesis/Small Bowel Tube Feeds/Constipation: As noted, her biggest concern today is the change in coverage for the enemas. Will discuss with compounding and see if we can do anything to help with cost. Could consider alternate enema if needed, given reports that she does get an effect from other formulations. I was able to find a sugar free acetaminophen which I will pass along to her to see if this would work for her. We had previously prescribed amitriptyline as a compound, but someone removed this prescription from her chart. Since we were able to figure out a better plan for getting this covered, will re-order this.     PLAN:                            Reminder montelukast comes in chewable tablet if needed going forward.    Hattie to update amitriptyline suspension in chart. Order placed per previous discussion with Dr. Genao.    Liquid acetaminophen advertised as sugar free -- https://www.[employer]/bottle-Acetaminophen-Cherry-Flavored-Liquid/dp/X318UD94B4/ref=sr_1_1?crid=[email or web address]&terrance=[id number].[id number]&dib_tag=se&keywords=gericare+liquid+acetaminophen+sugar+free&fyp=[id number]&sprefix=gericare+liquid+acetaminophen+sugar+kaylyn%2Caps%2C180&sr=8-1    Hattie to f/up on enema cost/options.  -- Discussed with compounding and GI team. Resolution handled in separate encounter.    Follow-up: as needed      SUBJECTIVE/OBJECTIVE:                          Dinora Mcghee is a 59 year old female called for a follow-up visit.       Reason for visit: discuss alternate medication dosage forms    Allergies/ADRs: Reviewed in chart  Tobacco: She reports that she quit smoking about 32 years ago. Her smoking use included cigarettes. She started smoking about 38 years ago. She has a 3.2 pack-year smoking history. She does not have any smokeless tobacco history on file.    Medication Adherence/Access:   -- levothyroxine: oral solution (ordered after last visit) -- did cover this and got it. Doesn't feel any different.  -- duloxetine: mixed sprinkle caps (ordered after last visit) -- did cover this and got it. Did double the dose and feels like these may be starting to work. She is sleeping better. Notes stomach pain has subsided and no longer having diarrhea.  -- montelukast: chewable tablets (through Dr. Andrade) -- Haven't had a chance to ask this yet.  -- baclofen: liquid (through pain management) -- Doesn't think this was covered. She thinks they did a prior authorization, and she thinks it's in a [id number] status.  -- amitriptyline compound: (through [facility] compounding) Is doing 20 mL of this.  -- sugar free acetaminophen: (OTC) has not been able to find  -- pink lady enemas: notes these used to be covered but are now very expensive (went from $3 per enema to $30 per enema)    Gastroparesis/Small Bowel Tube Feeds/Constipation:   Amitriptyline 40 mg daily   Famotidine 20 mg daily  Prochlorperazine 10 mg every 6 hours as needed   Scopolamine patch every 3 days  Pink lady enema as needed   Motegrity 2 mg daily   Relizorb cartridges daily   Pantoprazole 40 mg daily   Senosides 5 mL twice daily   Naloxegol 25 mg daily     Her biggest concern today is enema coverage because she relies on these. Notes that enemas daily would be ideal but she can't tolerate them. Notes that she can't tolerate them daily because it's a total body  "experience -- sweating, pain etc. She is seeing colorectal surgeon next week, wondering if there is any tight juncture or partial blockage. Notes pain/clicking in the same place. Notes that she was paying 225 for 60 of them before and now it is 350 for 12 of them. Notes all enemas are able to produce a result, but they are all horrible. She lays on the bathroom floor, for sometimes over an hour. Notes after her enema she had six bowel movements, but it was much later. Notes she doesn't get a good response from suppositories. She continues to take oral medications. Notes she hasn't had a \"normal\" stools. Notes right now she is using Fleet's suppositories. She did try this yesterday without an effect. She has done pelvic floor therapy, and has been applying this. She does feel it has helped, but she is still not going most days without an enema. Does feel that this is a mechanical issue.     ----------------    I spent 24 minutes with this patient today. All changes were made via verbal agreement with Vijaya Genao. A copy of the visit note was provided to the patient's provider(s).    A summary of these recommendations was sent via [employer].    Hattie VanceD, BCACP  MTM Pharmacist   [facility]   Phone: [phone]    Telemedicine Visit Details  Type of service:  Telephone visit  Start Time:  9:01 AM  End Time:  9:25 AM     Medication Therapy Recommendations  Small bowel tube feeding (H)    Rationale: Dosage form inappropriate - Ineffective medication - Effectiveness   Recommendation: Change Medication   Status: Resolved Med Access Issue              "

## 2024-05-09 NOTE — TELEPHONE ENCOUNTER
The RN called the patient to relay Dr. Gomez's recommendation to obtain CETIRIZINE HCL 10 MG PO TABS OTC. The patient verbalized understanding of the plan.

## 2024-05-09 NOTE — PATIENT INSTRUCTIONS
"Recommendations from today's MTM visit:                                                      Reminder montelukast comes in chewable tablet if needed going forward.    Hattie to update amitriptyline suspension in chart.    Liquid acetaminophen advertised as sugar free -- https://www.[employer]/bottle-Acetaminophen-Cherry-Flavored-Liquid/dp/W131QI79L0/ref=sr_1_1?crid=[email or web address]&terrance=[id number]crPlu9d08.eQiAkRtImvjadmPJPiq0hOfcAlWd71pYlMHf6nHvHo6&dib_tag=se&keywords=gericare+liquid+acetaminophen+sugar+free&hgs=[id number]&sprefix=gericare+liquid+acetaminophen+sugar+kaylyn%2Caps%2C180&sr=8-1    Hattie to f/up on enema cost/options.  -- Discussed with compounding and GI team. Resolution handled in separate encounter.    Follow-up: as needed     It was great speaking with you today.  I value your experience and would be very thankful for your time in providing feedback in our clinic survey. In the next few days, you may receive an email or text message from [email or web address] with a link to a survey related to your  clinical pharmacist.\"     To schedule another MTM appointment, please call the clinic directly or you may call the MTM scheduling line at [phone].    My Clinical Pharmacist's contact information:                                                      Please feel free to contact me with any questions or concerns you have.      Hattie VanceD, BCACP  MTM Pharmacist   [facility]   Phone: [phone]    "

## 2024-05-10 NOTE — PROGRESS NOTES
Colon and Rectal Surgery Consult Clinic Note    Referring provider:  Vijaya Genao MD  [address] [place] [address]       RE: Dinora Mcghee  : 1965  HAIM: 5/15/2024      Dinora Mcghee is a very pleasant 59 year old female with history of total pancreatectomy with islet auto transplantation, severe gastroparesis, gastrostomy tube present (for venting), jejunostomy tube dependent (for tube feeds) presenting today to discuss potential colectomy for constipation. She was subsequently sent to the [facility] for an opinion on this and a new patient consultation.     History of acute pancreatitis which was diagnosis in  at [facility]. An endoscopic retrograde cholangiopancreatography at that time was performed with issues with the pancreatic duct eventually with chronic pancreatitis ultimately s/p total pancreatectomy and islet autotransplant. Postoperatively she developed type 1 diabetes mellitus.   Diagnosed with gastroparesis and chronic pancreatitis by Dr. Park in 2015 with multiple hospitalizations, stents.   As well as at [place] in  had a GJ tube placed. This helped initially, and after her TPIAT she also experienced some improvement. She had a few years that she was able to eat but eventually transitioned back to tube feeds via her J-tube. She has a venting G-tube and is on chronic antiemetics (daily scopolamine patch, IV promethazine, J-tube promethazine, and compazine).   Chronic constipation since having children over 20 years ago. She has been on multiple medications including Miralax, senna, milk of magnesia, Linzess, Amitiza (lost effectiveness), Trulance (caused diarrhea and fecal incontinence), Motegrity.   Her current regimen is Motegrity, Senna BID, Miralax prn. With this she still has constipation and in 2024 she presented to a local ED, where she received two enemas with improvement. She also has had abdominal x-rays with  "gastrografin enema and passes a significant amount of stool following these. She follows with Dr. Genao who recommended daily pink lady enemas. However, this caused nocturnal fecal leakage and so the enemas were decreased to every other day.   Pelvic floor testing completed on 24 suggestive of pelvic floor dysfunction and obstructive defecation. On defecography, she was unable to evacuate any rectal contrast due to non-relaxation.     She recently did pelvic floor physical therapy with biofeedback. Her last visit was in 2024 and there was some improvement in relaxation in her pelvic floor muscles.  She has a complex past surgical history includin, , : C-sections  : laparoscopic cholecystectomy  : total abdominal hysterectomy with bilateral salpingoopherectomy  Appendectomy  2016: laparoscopic total pancreatectomy, islet cell autotransplant, splenectomy, gastrojejunostomy tube duodenojejunostomy, Vera-Y reconstruction, liver biopsy, lysis of adhesions >60 minutes  2018: laparoscopic incisional hernia (2 cm close to umbilicus) repair with 12 cm round Parietex mesh  2022: exploratory laparotomy, extensive lysis of adhesions, small bowel resection, placement of gastric jejunostomy for enteral feeding  2023: exploratory laparotomy, lysis of adhesions, perforated jejunostomy tube resection, replacement of jejunostomy tube  Colonoscopy 2023 with two 2 mm tubular adenomas, melanosis coli in cecum and ascending colon, normal random biopsies, sigmoid diverticulosis, internal and external hemorrhoids.    Dinora feels very limited by her constipation symptoms. She gets very \"backed up\" after a few days and this worsens her nausea. She uses the pink lady enemas every few days but still has issues with nocturnal leakage of loose stool on the evenings that she uses the enema. No leakage otherwise. She only has a bowel movement after using an enema and has not had a \"spontaneous\" " bowel movement for 6 months.     PLEASE SEE NOTE BELOW FOR PHYSICAL EXAMINATION, REVIEW OF SYSTEMS, AND OTHER HISTORY.    Assessment/Plan: 59 year old female with a complex medical and surgical history including total pancreatectomy with islet auto transplantation, severe gastroparesis, gastrostomy tube present (for venting), jejunostomy tube dependent (for tube feeds) presenting today for medically refractive constipation due to colonic dysmotility. She also has pelvic floor dysfunction and obstructive defecation, but recently underwent pelvic floor physical therapy with improvement noted. We discussed options for surgical intervention. With the colonic dysmotility, we discussed that a total abdominal colectomy would be reasonable. We could entertain an ileorectal anastomosis with diverting loop ileostomy, but she may still struggle with this due to pelvic floor dysfunction and nocturnal leakage after enemas. We also discussed the option of a total abdominal colectomy with end ileostomy (and leaving a rectal stump). She would like to think about it and learn more. We will provide her with some resources and get her in touch with our wound ostomy nurse for a consultation. She will reach out if she wants to move forward with surgery. Patient's questions were answered to her stated satisfaction and she is in agreement with this plan.     60 minutes spent on the date of encounter performing chart review, history and exam, documentation and further activities as noted above.    The Division of Colon and Rectal Surgery at [facility] is committed to advancing discovery and innovation in human health through research. Dinora Mcghee is willing to be contacted by our research team if they qualify for any future research studies:  N/A    Kaylene Branch MD  Colon and Rectal Surgery Staff  [facility]  Center      -------------------------------------------------------------------------------------------------------------------      Medical history:  Past Medical History:   Diagnosis Date    Allergic rhinitis, cause unspecified     Allergy to other foods     strawberries, apples, celeries, alice, watermelon    Arthritis     left knee    Choledocholithiasis     long after cholecystectomy    Chronic abdominal pain     Chronic constipation     Chronic nausea     Chronic pancreatitis (H)     Degeneration of lumbar or lumbosacral intervertebral disc     Esophageal reflux     w/ hiatal hernia    Gastroparesis     Hiatal hernia     History of pituitary adenoma     s/p resection    Hypothyroidism     Migraines     Mild hyperlipidemia     On tube feeding diet     presence of GJ tube    Pancreatic disease     PD stricture, 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 endometriosis growth    APPENDECTOMY       SECTION  1993    COLONOSCOPY  2014    COLONOSCOPY N/A 2023    Procedure: COLONOSCOPY, WITH POLYPECTOMY AND BIOPSY;  Surgeon: Percy Chamorro MD;  Location:  GI    ENDOSCOPIC INSERTION TUBE GASTROSTOMY N/A 2021    Procedure: Gastrojejonostomy placement with jejunopexy, PEG tube exchange;  Surgeon: Zackery Montoya MD;  Location:  OR    ENDOSCOPIC RETROGRADE CHOLANGIOPANCREATOGRAM N/A 2015    Procedure: ENDOSCOPIC RETROGRADE CHOLANGIOPANCREATOGRAM;  Surgeon: Mandeep Park MD;  Location:  OR    ENDOSCOPIC RETROGRADE CHOLANGIOPANCREATOGRAM N/A 2016    Procedure: COMBINED ENDOSCOPIC RETROGRADE CHOLANGIOPANCREATOGRAPHY, PLACE TUBE/STENT;   Surgeon: Mandeep Park MD;  [facility]    ENDOSCOPIC RETROGRADE CHOLANGIOPANCREATOGRAM N/A 03/17/2016    Procedure: COMBINED ENDOSCOPIC RETROGRADE CHOLANGIOPANCREATOGRAPHY, REMOVE FOREIGN BODY OR STENT/TUBE;  Surgeon: Mandeep Park MD;  [facility]    ENDOSCOPIC RETROGRADE CHOLANGIOPANCREATOGRAM N/A 08/02/2016    Procedure: COMBINED ENDOSCOPIC RETROGRADE CHOLANGIOPANCREATOGRAPHY, PLACE TUBE/STENT;  Surgeon: Mandeep Park MD;  [facility]    ENDOSCOPIC RETROGRADE CHOLANGIOPANCREATOGRAM N/A 08/26/2016    Procedure: COMBINED ENDOSCOPIC RETROGRADE CHOLANGIOPANCREATOGRAPHY, REMOVE FOREIGN BODY OR STENT/TUBE;  Surgeon: Mandeep Park MD;  [facility]    ENDOSCOPIC ULTRASOUND UPPER GASTROINTESTINAL TRACT (GI) N/A 10/03/2016    Procedure: ENDOSCOPIC ULTRASOUND, ESOPHAGOSCOPY / UPPER GASTROINTESTINAL TRACT (GI);  Surgeon: Guru Jose Rodas MD;  Location:  OR    ENT SURGERY  1989    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    ENTEROSCOPY SMALL BOWEL N/A 02/03/2022    Procedure: ENTEROSCOPY with possible fistula closure;  Surgeon: Francisco Dodson MD;  Location:  GI    ESOPHAGOSCOPY, GASTROSCOPY, DUODENOSCOPY (EGD), COMBINED N/A 06/24/2015    Procedure: COMBINED ESOPHAGOSCOPY, GASTROSCOPY, DUODENOSCOPY (EGD), REMOVE FOREIGN BODY;  Surgeon: Mandeep Park MD;  Location:  GI    ESOPHAGOSCOPY, GASTROSCOPY, DUODENOSCOPY (EGD), COMBINED N/A 10/25/2015    Procedure: COMBINED ESOPHAGOSCOPY, GASTROSCOPY, DUODENOSCOPY (EGD);  Surgeon: Sammy Amaro MD;  Location:  GI    ESOPHAGOSCOPY, GASTROSCOPY, DUODENOSCOPY (EGD), COMBINED N/A 10/25/2015    Procedure: COMBINED ESOPHAGOSCOPY, GASTROSCOPY, DUODENOSCOPY (EGD), BIOPSY SINGLE OR MULTIPLE;  Surgeon: Sammy Amaro MD;  Location:  GI    ESOPHAGOSCOPY, GASTROSCOPY, DUODENOSCOPY (EGD), COMBINED N/A 01/31/2023    Procedure: ESOPHAGOGASTRODUODENOSCOPY (EGD) with peg replacement ;  Surgeon:  Mandeep Park MD;  [facility]    ESOPHAGOSCOPY, GASTROSCOPY, DUODENOSCOPY (EGD), DILATATION, COMBINED      EXCISE LESION TRUNK N/A 04/17/2017    Procedure: EXCISE LESION TRUNK;  Removal of Abdominal Foreign Body;  Surgeon: Nestor Phoenix MD;  [facility]    HC ESOPH/GAS REFLUX TEST W NASAL IMPED >1 HR N/A 11/19/2015    Procedure: ESOPHAGEAL IMPEDENCE FUNCTION TEST WITH 24 HOUR PH GREATER THAN 1 HOUR;  Surgeon: Thiago Apple MD;  [facility]: [facility] GI    HC UGI ENDOSCOPY DIAG W BIOPSY  09/17/2008    HC UGI ENDOSCOPY DIAG W BIOPSY  09/27/2012    HC UGI ENDOSCOPY W ESOPHAGEAL DILATION BALLOON <30MM  09/17/2008    HC UGI ENDOSCOPY W EUS N/A 05/05/2015    Procedure: COMBINED ENDOSCOPIC ULTRASOUND, ESOPHAGOSCOPY, GASTROSCOPY, DUODENOSCOPY (EGD);  Surgeon: Wm Dueñas MD;  [facility]    HC WRIST ARTHROSCOP,RELEASE XVERS LIG Bilateral 12/17/2008    INJECT TRANSVERSUS ABDOMINIS PLANE (TAP) BLOCK BILATERAL Left 09/22/2016    Procedure: INJECT TRANSVERSUS ABDOMINIS PLANE (TAP) BLOCK BILATERAL;  Surgeon: Dickson Corrigan MD;  [facility]    INJECT TRIGGER POINT Bilateral 09/08/2022    Procedure: Abdominal trigger point injection with ultrasound;  Surgeon: Monika Mahajan MD;  [facility]    INJECT TRIGGER POINT SINGLE / MULTIPLE 3 OR MORE MUSCLES Right 11/15/2022    Procedure: Trigger point injections right abdomen with ultrasound guidance;  Surgeon: Monika Mahajan MD;  Location: [facility] OR    IR CHEST PORT PLACEMENT < 5 YRS OF AGE  06/10/2022    IR CVC TUNNEL PLACEMENT > 5 YRS OF AGE  4/15/2024    IR PORT REMOVAL RIGHT  11/09/2023    laparoscopic pineda  01/01/1995    LAPAROSCOPIC HERNIORRHAPHY INCISIONAL N/A 08/23/2018    Procedure: LAPAROSCOPIC HERNIORRHAPHY INCISIONAL;  Laparoscopic Incisional Hernia Repair with Symbotex Mesh Implant;  Surgeon: Nestor Phoenix MD;  [facility]    LAPAROSCOPIC PANCREATECTOMY, TRANSPLANT AUTO ISLET CELL N/A 12/28/2016    Procedure: LAPAROSCOPIC  PANCREATECTOMY, TRANSPLANT AUTO ISLET CELL;  Surgeon: Nestor Phoenix MD;  [facility]    LAPAROTOMY EXPLORATORY N/A 01/31/2023    Procedure: Exploratory Laparotomy, lysis of adhesions, Perforated J-Junostomy Resection, Replace J-Junostomy site;  Surgeon: Elver Bauer MD;  [facility]    LAPAROTOMY, LYSIS ADHESIONS, COMBINED N/A 01/31/2023    Procedure: Dilatation of jejunostomy feeding tube, track with placement of jejunostomy tube with fluoroscopy;  Surgeon: Elver Bauer MD;  [facility]    PICC DOUBLE LUMEN PLACEMENT Left 11/13/2023    Basilic Vein 5F DL 43 cm    REMOVE AND REPLACE BREAST IMPLANT PROSTHESIS N/A 12/30/2022    Procedure: Exploratory Laparotomy, lysis of adhesions, small bowel resection. Placement of gastric jejunostomy for enteral feeding.;  Surgeon: Elver Bauer MD;  [facility]    REMOVE GASTROSTOMY TUBE ADULT N/A 01/28/2022    Procedure: REMOVAL, GASTROSTOMY TUBE, ADULT;  Surgeon: Mandeep Park MD;  [facility]    REPLACE GASTROJEJUNOSTOMY TUBE, PERCUTANEOUS N/A 09/07/2021    Procedure: GASTROJEJUNOSTOMY TUBE PLACEMENT, PERCUTANEOUS, WITH GASTROPEXY;  Surgeon: Mandeep Park MD;  [facility]    REPLACE GASTROJEJUNOSTOMY TUBE, PERCUTANEOUS N/A 09/22/2021    Procedure: REPLACEMENT, GASTROJEJUNOSTOMY TUBE, PERCUTANEOUS;  Surgeon: Zackery Montoya MD;  [facility]    REPLACE GASTROJEJUNOSTOMY TUBE, PERCUTANEOUS N/A 11/22/2022    Procedure: REPLACEMENT, GASTROJEJUNOSTOMY TUBE, PERCUTANEOUS;  Surgeon: Mandeep Park MD;  [facility]    REPLACE GASTROJEJUNOSTOMY TUBE, PERCUTANEOUS N/A 12/09/2022    Procedure: REPLACEMENT, GASTROJEJUNOSTOMY TUBE, PERCUTANEOUS with ENDOSCOPIC SUTURING.;  Surgeon: Mandeep Park MD;  [facility]    REPLACE GASTROJEJUNOSTOMY TUBE, PERCUTANEOUS N/A 08/01/2023    Procedure: Replace Gastrojejunostomy Tube, Percutaneous;  Surgeon: Mandeep Park MD;  [facility]    REPLACE GASTROJEJUNOSTOMY TUBE, PERCUTANEOUS N/A 2023    Procedure: Replace Gastrojejunostomy Tube, Percutaneous;  Surgeon: Francisco Dodson MD;  [facility]    REPLACE GASTROJEJUNOSTOMY TUBE, PERCUTANEOUS N/A 2023    Procedure: Replace Gastrojejunostomy Tube, Percutaneous;  Surgeon: Mandeep Park MD;  [facility]    REPLACE JEJUNOSTOMY TUBE, PERCUTANEOUS N/A 09/10/2021    Procedure: UPPER ENDOSCOPY, REPLACEMENT OF PERCUTANEOUS GASTROJEJUNOSTOMY TUBE, T-TAG GASTROPEXY;  Surgeon: Zackery Montoya MD;  [facility]    REPLACE JEJUNOSTOMY TUBE, PERCUTANEOUS N/A 2021    Procedure: REPLACEMENT, JEJUNOSTOMY TUBE, PERCUTANEOUS;  Surgeon: Mandeep Park MD;  [facility]    REPLACE JEJUNOSTOMY TUBE, PERCUTANEOUS N/A 2023    Procedure: REPLACEMENT, JEJUNOSTOMY TUBE, PERCUTANEOUS;  Surgeon: Mandeep Park MD;  [facility]    REPLACE JEJUNOSTOMY TUBE, PERCUTANEOUS  2023    Procedure: Replace Jejunostomy Tube, Percutaneous;  Surgeon: Mandeep Park MD;  [facility]    REPLACE JEJUNOSTOMY TUBE, PERCUTANEOUS N/A 2024    Procedure: REPLACEMENT, JEJUNOSTOMY TUBE, PERCUTANEOUS;  Surgeon: Francisco Dodson MD;  [facility]    transphenoidal pituitary resection  1990    [facility]  DELIVERY ONLY  1996    [facility]  DELIVERY ONLY  1998    repeat c section with incidental cystotomy with repair    [facility] EXCIS PITUITARY,TRANSNASAL/SEPTAL  1980    pituitary tumor removed for diabetes insipidus    [facility] TOTAL ABDOM HYSTERECTOMY  1999    w/ bilateral salpingoophorectomy        Problem list:    Patient Active Problem List    Diagnosis Date Noted    Bacteremia 11/10/2023     Priority: Medium    Abdominal pain, unspecified abdominal location 2023     Priority: Medium    Cholangitis (H28) 2023     Priority: Medium    On total parenteral nutrition (TPN) 2023     Priority: Medium    Fever,  unspecified fever cause 06/04/2023     Priority: Medium    Chronic pancreatitis, unspecified pancreatitis type (H) 06/04/2023     Priority: Medium    History of food intolerance 05/09/2023     Priority: Medium    Malnutrition, unspecified type (H24) 05/09/2023     Priority: Medium    Nausea and vomiting, unspecified vomiting type 05/09/2023     Priority: Medium    Major depression, single episode 03/06/2023     Priority: Medium    Acute postoperative abdominal pain 01/31/2023     Priority: Medium    Feeding intolerance 12/30/2022     Priority: Medium    Myofascial pain 10/14/2022     Priority: Medium     Added automatically from request for surgery [id number]      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  pancreas and spleen removed, islet cells transplanted into liver    3 classes of vaccines needed .    1. Pneumococcal vaccines are as follows:       PCV 13 - one time dose (was given 2017)       PPSV23 - (given 12/1/16); repeat q 5 years      ROLE of PCV 20???    2. Meningococcal vaccines     Menactra - (last given 12/1/16) -  repeat q 5 yrs   NOTE: need to wait 4 wks after PCV 13 has been given.   OR - give Menveo instead as it can be given same time as PCV 13    AND   Bexcero - (got 12/9/16) - does not need to be repeated.     3. The Hib vaccine - (got 12/9/16) - does not need to be repeated.      Poisoning by drug 05/09/2022     Priority: Medium     Formatting of this note might be different from the original.  Per Care Everywhere review:  All oral, IV and injectable steroids are contraindicated (unless in life threatening situations) in Islet Auto transplant recipients. They can cause irreversible loss of islet cell function. Please contact patient's transplant care coordinator ADI Gaffney RN at [phone]/pager [phone] and/or endocrinologist prior to administration.      Type 1 diabetes mellitus without complication (H)  05/09/2022     Priority: Medium     Formatting of this note might be different from the original.  ACTUALLY - DM 3c - pathogenic diabetes as no pancreas.  (pancreas and spleen removed, islet cells transplanted into liver)    Seeing ENDO at [facility] - Dr Erum Melissa      Intra-abdominal abscess (H) 12/03/2021     Priority: Medium    Postprocedural intraabdominal abscess 12/03/2021     Priority: Medium    Gastrostomy tube obstruction (H) 11/27/2021     Priority: Medium    Abdominal pain, generalized 09/18/2021     Priority: Medium    Adult failure to thrive 08/16/2021     Priority: Medium     Added automatically from request for surgery [id number]      Hypoglycemia 08/05/2021     Priority: Medium    Iron deficiency anemia 03/22/2019     Priority: Medium    Headache, chronic migraine without aura 09/18/2018     Priority: Medium    Chronic pain syndrome 09/18/2018     Priority: Medium    S/P hernia repair 08/23/2018     Priority: Medium    Incisional hernia, without obstruction or gangrene 05/20/2018     Priority: Medium    Adhesive capsulitis of shoulder, unspecified laterality 11/14/2017     Priority: Medium    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  Excess IgG4  [facility]      Gastroparesis      Priority: Medium    ACP (advance care planning) 03/28/2017     Priority: Medium     Advance Care Planning 3/28/2017: Receipt of ACP document:  Received: Health Care Directive which was witnessed or notarized on 12/8/16.  Document previously scanned on 1/12/17.  Validation form completed and scanned.  Code Status reflects choices in most recent ACP document..  Confirmed/documented designated decision maker(s).  Added by May Baires   Advance Care Planning Liaison              Pancreatic insufficiency 01/17/2017     Priority: Medium     Diabetes insipidus (H24) 01/05/2017     Priority: Medium     Formatting of this note might be different from the original.  Diabetes Insipidus (DI)  Per external records.  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  Depression NOS      Other type of intractable migraine      Priority: Medium     Diagnosis updated by automated process. Provider to review and confirm.      GERD (gastroesophageal reflux disease) 12/01/2010     Priority: Medium    Lumbago 04/18/2005     Priority: Medium     History of L5-S1 degenerative disk disease.        Sensorineural hearing loss 01/10/2005     Priority: Medium     Problem list name updated by automated process. Provider to review      Vertiginous syndrome and labyrinthine disorder 01/10/2005     Priority: Medium     Problem list name updated by automated process. Provider to review  IMO Regulatory Load OCT 2020      Sprain and strain of other specified sites of hip and thigh 12/09/2002     Priority: Medium    Chondromalacia of patella 12/09/2002     Priority: Medium    Need for prophylactic immunotherapy      Priority: Medium     trees, grass, srw, dust mites, cat      Allergic rhinitis due to other allergen 12/21/2001     Priority: Medium    Hypothyroidism      Priority: Medium     Problem list name updated by automated process. Provider to review         Medications:  Current Outpatient Medications   Medication Sig Dispense Refill    acetaminophen (TYLENOL) 325 MG/10.15ML solution 20.3 mLs (650 mg) by Per J Tube route every 6 hours as needed for mild pain or fever 473 mL 4    baclofen (LIORESAL)  "10 MG tablet 1-2 tablets (10-20 mg) by Per G Tube route 3 times daily 60 tablet 1    buprenorphine (SUBUTEX) 2 MG SUBL sublingual tablet Place 2 mg under the tongue 2 times daily      cetirizine (ZYRTEC) 10 MG tablet 1 tablet (10 mg) by Per G Tube route 2 times daily 60 tablet 11    COMPOUNDED NON-CONTROLLED SUBSTANCE (CMPD RX) - PHARMACY TO MIX COMPOUNDED MEDICATION Administer 40 mg amitriptyline suspension (2 mg/mL) via G-J tube at bedtime. 600 mL 5    COMPOUNDED NON-CONTROLLED SUBSTANCE (CMPD RX) - PHARMACY TO MIX COMPOUNDED MEDICATION Place 1 enema rectally 2 times daily as needed (for constipation) 60 each 3    Continuous Blood Gluc Sensor (Zevan LimitedSTYLE LISA 3 SENSOR) MISC CHANGE EVERY 14 DAYS 2 each 11    Digestive Enzyme Cartridge (RELIZORB) MARCO 2 Cartridges daily 60 each 6    diphenhydrAMINE (BENADRYL) 12.5 MG/5ML solution Take 25 mg by mouth 4 times daily as needed for other (nausea)      droNABinol (SYNDROS) 5 MG/ML oral soln Take 0.5 mLs (2.5 mg) by mouth 2 times daily 60 mL 0    DULoxetine HCl 60 MG CSDR 1 capsule (60 mg) by Per J Tube route daily Sprinkle caps for feeding tube 30 capsule 1    estradiol (VAGIFEM) 10 MCG TABS vaginal tablet INSERT 1 TABLET(10 MCG) VAGINALLY 2 TIMES A WEEK 24 tablet 2    famotidine (PEPCID) 40 MG/5ML suspension 2.5 mLs (20 mg) by Per J Tube route daily 50 mL 4    glucagon 1 MG kit Give 0.1 to 0.15mg( 10-15 units on Insulin sryinge) subcutaneous  every 15 minutes PRN for hypoglycemia. Remix new kit q24hr. Needs up to 3 kit/week. 10 each 3    glucose 40 % GEL gel Take 15-30 g by mouth every 15 minutes as needed for low blood sugar 3 Tube 2    insulin aspart (NOVOLOG PEN) 100 UNIT/ML pen Take 1 unit if BG is 175-275, and 2 units if >275 up to every 4 hours as needed.  \"Prime\" pen needle with 2 unit airshot before giving, needs supply for 15 unit/day. 15 mL 11    insulin detemir (LEVEMIR PEN) 100 UNIT/ML pen Take 7 to 10 units per day, adjust as directed by MD.  Do 2 unit " "'prime' before dosing, needs 12 units/day supply. 15 mL 5    insulin syringe-needle U-100 (30G X 1/2\" 0.3 ML) 30G X 1/2\" 0.3 ML miscellaneous Use 3 syringes daily or as directed. 100 each 11    lactated ringers infusion Inject 1,000 mLs into the vein daily as needed      levothyroxine (SYNTHROID) 25 mcg/mL SUSP 5.48 mLs (137 mcg) by Per J Tube route daily 500 mL 3    lidocaine (LIDODERM) 5 % patch Place onto the skin every 24 hours To prevent lidocaine toxicity, patient should be patch free for 12 hrs daily.      lipase-protease-amylase (CREON) 53266-63265-096443 units CPEP per EC capsule Take 3-4 capsules by mouth 3 times daily (with meals) With oral meals 150 capsule 11    methocarbamol (ROBAXIN) 750 MG tablet Take 1 tablet (750 mg) by mouth 4 times daily as needed for muscle spasms 120 tablet 11    MOTEGRITY 2 MG tablet TAKE ONE TABLET BY MOUTH ONCE DAILY 30 tablet 6    naloxegol (MOVANTIK) 12.5 MG TABS tablet Take 25 mg by mouth every morning (before breakfast)      Nutritional Supplements (BOOST HIGH PROTEIN) LIQD After above baseline labs are drawn, give: 6 mL/kg to maximum of 360 mL; the beverage is to be consumed within 5 minutes.  0    pantoprazole (PROTONIX) 40 mg IV push injection Inject 40 mg into the vein daily (with breakfast) 30 each 11    prochlorperazine (COMPAZINE) 10 MG tablet Take 1 tablet (10 mg) by mouth every 6 hours as needed for nausea or vomiting 120 tablet 3    promethazine 25 mg Inject 25 mg into the vein 3 times daily      rimegepant (NURTEC) 75 MG ODT tablet Place 1 tablet (75 mg) under the tongue daily as needed for migraine Maximum of 1 tablet every 24 hours. 8 tablet 11    scopolamine (TRANSDERM) 1 MG/3DAYS 72 hr patch Place 1 patch onto the skin every 72 hours - Transdermal 10 patch 11    sennosides (SENOKOT) 8.8 MG/5ML syrup 5 mLs by Per J Tube route 2 times daily 236 mL 11    Sharps Container (BD SHARPS ) MISC 1 Container as needed 1 each 1    silver nitrate (ARZOL SILVER " "NIT APPLICATORS) 75-25 % miscellaneous Apply topically as needed for wound care Apply Silver Nitrate Sticks every 2-3 days until granulation tissue resolved.  \"Roll\" tip of Silver Nitrate Q tip directly onto the granulation tissue-bulging tissue area.  Have Agency or Home Care Nurse administer this, if you prefer.  Take care not to touch any healthy tissue or skin.  The tissue will turn white and eventually black & scab off.  May repeat if needed in the future for recurrence. 30 applicator 0    sodium phosphate, mineral oil, magnesium citrate enema Instill 1 pink 187 ml enema twice daily as needed for constipation      STATIN NOT PRESCRIBED (INTENTIONAL) Previous liver issues, risks vs benefits felt to not warrant statin.    Discussed Oct 2022 visit      zinc oxide - white petrolatum (CRITIC-AID THICK MOIST BARRIER) 20-51% PSTE topical paste Apply 71 g topically every hour as needed for rash (Under J tube bumper when needed for skin protection) 170 g 0    ZOLMitriptan (ZOMIG-ZMT) 5 MG ODT Take 1 tablet (5 mg) by mouth at onset of headache for migraine Dissolve tablet in the mouth. May repeat in 2 hours. Max 2 tablets/24 hours. 12 tablet 6    COMPRO 25 MG suppository INSERT ONE SUPPOSITORY RECTALLY EVERY 12 HOURS AS NEEDED FOR NAUSEA 10 suppository 1       Allergies:  Allergies   Allergen Reactions    Apple Juice Anaphylaxis    Corticosteroids Other (See Comments)     All oral, IV and injectable steroids are contraindicated (unless in life threatening situations) in [facility] transplant recipients. They can cause irreversible loss of islet cell function. Please contact patient's transplant care coordinator ADI Gaffney RN at [phone]/pager [phone] and/or endocrinologist prior to administration.      Depakote [Divalproex Sodium] Other (See Comments)     Chest pain    Zithromax [Azithromycin Dihydrate] Anaphylaxis     Noted in 4/7/08 ER    Bromfenac      Other reaction(s): Headache    Codeine      Other " reaction(s): Hallucinations    Darvocet [Propoxyphene N-Apap] Itching    Morphine Nausea and Vomiting and Rash    Nalbuphine Hcl Rash     RASH :nubaine    Zosyn [Piperacillin-Tazobactam In Dex] Rash     Possible allergy, did have a diffuse rash that seemed drug related but could have also been related to soap in the hospital.     Bactrim [Sulfamethoxazole W-Trimethoprim] Other (See Comments) and Nausea and Vomiting     Severely low liver function.    Statins Other (See Comments)     Previous liver issues, risks vs benefits felt to not warrant statin.  Discussed Oct 2022 visit    Tape [Adhesive Tape] Blisters    Tramadol     Zofran [Ondansetron] Other (See Comments)     migraine    Flagyl [Metronidazole] Hives and Rash       Family history:  Family History   Problem Relation Age of Onset    Lipids Mother     Hypertension Mother     Thyroid Disease Mother     Depression Mother     Angina Mother     GERD Mother     Skin Cancer Mother     Migraines Mother     Autoimmune Disease Mother     Hyperlipidemia Mother     Mental Illness Mother     Cerebrovascular Disease Mother         11/2023    Other Cancer Mother         skin-basil cell    Anxiety Disorder Mother     Eye Disorder Father         cataract, detached retina    Myocardial Infarction Father 60    Lipids Father     Cerebrovascular Disease Father     Depression Father     Substance Abuse Father     Anesthesia Reaction Father         stroke right after surgery    Cataracts Father     Osteoarthritis Father     Ulcerative Colitis Father     Autoimmune Disease Father     Heart Disease Father     Hyperlipidemia Father     Mental Illness Father     Other Cancer Father         myloma    Anxiety Disorder Father     Interpersonal Violence Sister     Coronary Artery Disease Sister         angioplasty    GERD Sister     Substance Abuse Sister     Cerebrovascular Disease Sister         2005    Depression Sister     Thyroid Disease Sister     Eye Disorder Maternal Grandmother          cataract    Thyroid Disease Maternal Grandmother     Diabetes Maternal Grandfather     Eye Disorder Paternal Grandmother         cataract    Diabetes Paternal Grandmother     Eye Disorder Paternal Grandfather         cataract    Diabetes Paternal Grandfather     Substance Abuse Paternal Grandfather     Eye Disorder Son         ptosis    Depression Son     Anxiety Disorder Son     Heart Disease Paternal Aunt     Diabetes Paternal Aunt     Diabetes Paternal Uncle     Heart Disease Paternal Uncle     Depression Nephew     Anxiety Disorder Nephew     Thyroid Disease Nephew     Diabetes Type 2  Cousin         paternal cousin    Autoimmune Disease Cousin     Autoimmune Disease Sister     Depression Sister     Mental Illness Sister     Substance Abuse Sister     Thyroid Disease Sister     Depression Son     Mental Illness Son     Anxiety Disorder Son     Thyroid Disease Nephew     Anxiety Disorder Nephew        Social history:  Social History     Socioeconomic History    Marital status:      Spouse name: Norris    Number of children: 3    Years of education: 16    Highest education level: Not on file   Occupational History    Occupation: director     Employer: [employer]   Tobacco Use    Smoking status: Former     Current packs/day: 0.00     Average packs/day: 0.5 packs/day for 6.3 years (3.2 ttl pk-yrs)     Types: Cigarettes     Start date: 1985     Quit date: 1992     Years since quittin.3    Smokeless tobacco: Not on file    Tobacco comments:     no 2nd hand   Vaping Use    Vaping status: Some Days    Substances: THC, CBD   Substance and Sexual Activity    Alcohol use: Not Currently     Alcohol/week: 3.0 - 6.0 standard drinks of alcohol     Types: 1 - 2 Glasses of wine, 1 - 2 Cans of beer, 1 - 2 Shots of liquor per week     Comment: none since IGG    Drug use: No     Comment: medical canabis tincture and vape    Sexual activity: Yes     Partners: Male     Birth control/protection: Male Surgical,  Female Surgical, None     Comment: Hystectomy 1999   Other Topics Concern    Parent/sibling w/ CABG, MI or angioplasty before 65F 55M? No     Service Not Asked    Blood Transfusions No    Caffeine Concern Not Asked    Occupational Exposure Not Asked    Hobby Hazards Not Asked    Sleep Concern Not Asked    Stress Concern Not Asked    Weight Concern Not Asked    Special Diet Not Asked    Back Care Not Asked    Exercise Not Asked    Bike Helmet Not Asked    Seat Belt Yes    Self-Exams Not Asked   Social History Narrative     with 3 children and a dog.  No smoking, etoh or drug use.  Worked as a  for [employer] in [place] in the past.  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     Social Determinants of Health     Financial Resource Strain: Low Risk  (4/26/2024)    Financial Resource Strain     Within the past 12 months, have you or your family members you live with been unable to get utilities (heat, electricity) when it was really needed?: No   Food Insecurity: Low Risk  (4/26/2024)    Food Insecurity     Within the past 12 months, did you worry that your food would run out before you got money to buy more?: No     Within the past 12 months, did the food you bought just not last and you didn t have money to get more?: No   Transportation Needs: Low Risk  (4/26/2024)    Transportation Needs     Within the past 12 months, has lack of transportation kept you from medical appointments, getting your medicines, non-medical meetings or appointments, work, or from getting things that you need?: No   Physical Activity: Sufficiently Active (3/19/2024)    Exercise Vital Sign     Days of Exercise per Week: 4 days     Minutes of Exercise per Session: 40 min   Stress: Stress Concern Present (3/19/2024)    [employer] - Occupational Stress Questionnaire     Feeling of Stress : To some extent   Social Connections: Unknown (3/19/2024)     Social Connection and Isolation Panel [NHANES]     Frequency of Communication with Friends and Family: Not on file     Frequency of Social Gatherings with Friends and Family: Once a week     Attends Yarsani Services: Not on file     Active Member of Clubs or Organizations: Not on file     Attends Club or Organization Meetings: Not on file     Marital Status: Not on file   Interpersonal Safety: Low Risk  (3/20/2024)    Interpersonal Safety     Do you feel physically and emotionally safe where you currently live?: Yes     Within the past 12 months, have you been hit, slapped, kicked or otherwise physically hurt by someone?: No     Within the past 12 months, have you been humiliated or emotionally abused in other ways by your partner or ex-partner?: No   Housing Stability: Low Risk  (4/26/2024)    Housing Stability     Do you have housing? : Yes     Are you worried about losing your housing?: No         Nursing Notes:   Herbie Kasper, EMT  5/15/2024  5:48 PM  Signed  Chief Complaint   Patient presents with    Consult       Vitals:    05/15/24 1746   BP: 128/77   BP Location: Left arm   Patient Position: Sitting   Cuff Size: Adult Regular   Pulse: 87   SpO2: 97%       There is no height or weight on file to calculate BMI.    Herbie Kasper EMT-P\       Physical Examination:  /77 (BP Location: Left arm, Patient Position: Sitting, Cuff Size: Adult Regular)   Pulse 87   SpO2 97%   General: alert, oriented, in no acute distress  Abdomen: flat, well healed midline scar, G and J tubes present.

## 2024-05-15 ENCOUNTER — PRE VISIT (OUTPATIENT)
Dept: SURGERY | Facility: CLINIC | Age: 59
End: 2024-05-15

## 2024-05-15 ENCOUNTER — OFFICE VISIT (OUTPATIENT)
Dept: SURGERY | Facility: CLINIC | Age: 59
End: 2024-05-15
Attending: INTERNAL MEDICINE
Payer: COMMERCIAL

## 2024-05-15 VITALS — OXYGEN SATURATION: 97 % | HEART RATE: 87 BPM | DIASTOLIC BLOOD PRESSURE: 77 MMHG | SYSTOLIC BLOOD PRESSURE: 128 MMHG

## 2024-05-15 DIAGNOSIS — K59.01 SLOW TRANSIT CONSTIPATION: ICD-10-CM

## 2024-05-15 DIAGNOSIS — K59.00 CONSTIPATION, UNSPECIFIED CONSTIPATION TYPE: Primary | ICD-10-CM

## 2024-05-15 PROCEDURE — 99215 OFFICE O/P EST HI 40 MIN: CPT | Performed by: COLON & RECTAL SURGERY

## 2024-05-15 RX ORDER — PROCHLORPERAZINE 25 MG/1
SUPPOSITORY RECTAL
Qty: 10 SUPPOSITORY | Refills: 1 | Status: SHIPPED | OUTPATIENT
Start: 2024-05-15 | End: 2024-07-23

## 2024-05-15 ASSESSMENT — PAIN SCALES - GENERAL: PAINLEVEL: MILD PAIN (2)

## 2024-05-15 NOTE — TELEPHONE ENCOUNTER
LVD:  3/6/2024  [facility] [place]     Vijaya Genao MD  Gastroenterology Gastroparesis     Refilled per protocol.

## 2024-05-15 NOTE — LETTER
5/15/2024       RE: Dinora Mcghee  [address] [facility] [address]       Dear Colleague,    Thank you for referring your patient, Dinora Mcghee, to the [facility] [place] [facility]. Please see a copy of my visit note below.    Colon and Rectal Surgery Consult Clinic Note    Referring provider:  Vijaya Genao MD  [address] [place] [address]       RE: Dinora Mcghee  : 1965  HAIM: 5/15/2024      Dinora Mcghee is a very pleasant 59 year old female with history of total pancreatectomy with islet auto transplantation, severe gastroparesis, gastrostomy tube present (for venting), jejunostomy tube dependent (for tube feeds) presenting today to discuss potential colectomy for constipation. She was subsequently sent to the Colon and Rectal Surgery Clinic for an opinion on this and a new patient consultation.     History of chronic pancreatitis ultimately s/p total pancreatectomy and islet autotransplant. Postoperatively she developed type 1 diabetes mellitus.   Diagnosed with gastroparesis at [facility] in  and had a GJ tube placed that year for that. This helped initially, and after her TPIAT she also experienced some improvement. She had a few years that she was able to eat but eventually transitioned back to tube feeds via her J-tube. She has a venting G-tube and is on chronic antiemetics (daily scopolamine patch, IV promethazine, J-tube promethazine, and compazine).   Chronic constipation since having children over 20 years ago. She has been on multiple medications including Miralax, senna, milk of magnesia, Linzess, Amitiza (lost effectiveness), Trulance (caused diarrhea and fecal incontinence), Motegrity.   Her current regimen is Motegrity, Senna BID, Miralax prn. With this she still has constipation and in 2024 she presented to a local ED, where she received two enemas with  "improvement. She also has had abdominal x-rays with gastrografin enema and passes a significant amount of stool following these. She follows with Dr. Genao who recommended daily pink lady enemas. However, this caused nocturnal fecal leakage and so the enemas were decreased to every other day.   Pelvic floor testing completed on 24 suggestive of pelvic floor dysfunction and obstructive defecation. On defecography, she was unable to evacuate any rectal contrast due to non-relaxation.     She recently did pelvic floor physical therapy with biofeedback. Her last visit was in 2024 and there was some improvement in relaxation in her pelvic floor muscles.  She has a complex past surgical history includin, , : C-sections  : laparoscopic cholecystectomy  : total abdominal hysterectomy with bilateral salpingoopherectomy  Appendectomy  2016: laparoscopic total pancreatectomy, islet cell autotransplant, splenectomy, gastrojejunostomy tube duodenojejunostomy, Vera-Y reconstruction, liver biopsy, lysis of adhesions >60 minutes  2018: laparoscopic incisional hernia (2 cm close to umbilicus) repair with 12 cm round Parietex mesh  2022: exploratory laparotomy, extensive lysis of adhesions, small bowel resection, placement of gastric jejunostomy for enteral feeding  2023: exploratory laparotomy, lysis of adhesions, perforated jejunostomy tube resection, replacement of jejunostomy tube  Colonoscopy 2023 with two 2 mm tubular adenomas, melanosis coli in cecum and ascending colon, normal random biopsies, sigmoid diverticulosis, internal and external hemorrhoids.    Dinora feels very limited by her constipation symptoms. She gets very \"backed up\" after a few days and this worsens her nausea. She uses the pink lady enemas every few days but still has issues with nocturnal leakage of loose stool on the evenings that she uses the enema. No leakage otherwise. She only has a bowel movement " "after using an enema and has not had a \"spontaneous\" bowel movement for 6 months.     PLEASE SEE NOTE BELOW FOR PHYSICAL EXAMINATION, REVIEW OF SYSTEMS, AND OTHER HISTORY.    Assessment/Plan: 59 year old female with a complex medical and surgical history including total pancreatectomy with islet auto transplantation, severe gastroparesis, gastrostomy tube present (for venting), jejunostomy tube dependent (for tube feeds) presenting today for medically refractive constipation due to colonic dysmotility. She also has pelvic floor dysfunction and obstructive defecation, but recently underwent pelvic floor physical therapy with improvement noted. We discussed options for surgical intervention. With the colonic dysmotility, we discussed that a total abdominal colectomy would be reasonable. We could entertain an ileorectal anastomosis with diverting loop ileostomy, but she may still struggle with this due to pelvic floor dysfunction and nocturnal leakage after enemas. We also discussed the option of a total abdominal colectomy with end ileostomy (and leaving a rectal stump). She would like to think about it and learn more. We will provide her with some resources and get her in touch with our wound ostomy nurse for a consultation. She will reach out if she wants to move forward with surgery. Patient's questions were answered to her stated satisfaction and she is in agreement with this plan.     60 minutes spent on the date of encounter performing chart review, history and exam, documentation and further activities as noted above.    The Division of Colon and Rectal Surgery at [facility] is committed to advancing discovery and innovation in human health through research. Dinora Mcghee is willing to be contacted by our research team if they qualify for any future research studies:  N/A    Kaylene Branch MD  Colon and Rectal Surgery Staff  [facility]      -------------------------------------------------------------------------------------------------------------------      Medical history:  Past Medical History:   Diagnosis Date    Allergic rhinitis, cause unspecified     Allergy to other foods     strawberries, apples, celeries, alice, watermelon    Arthritis     left knee    Choledocholithiasis     long after cholecystectomy    Chronic abdominal pain     Chronic constipation     Chronic nausea     Chronic pancreatitis (H)     Degeneration of lumbar or lumbosacral intervertebral disc     Esophageal reflux     w/ hiatal hernia    Gastroparesis     Hiatal hernia     History of pituitary adenoma     s/p resection    Hypothyroidism     Migraines     Mild hyperlipidemia     On tube feeding diet     presence of GJ tube    Pancreatic disease     PD stricture, 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 endometriosis growth    APPENDECTOMY       SECTION  1993    COLONOSCOPY  2014    COLONOSCOPY N/A 2023    Procedure: COLONOSCOPY, WITH POLYPECTOMY AND BIOPSY;  Surgeon: Percy Chamorro MD;  Location:  GI    ENDOSCOPIC INSERTION TUBE GASTROSTOMY N/A 2021    Procedure: Gastrojejonostomy placement with jejunopexy, PEG tube exchange;  Surgeon: Zackery Montoya MD;  Location:  OR    ENDOSCOPIC RETROGRADE CHOLANGIOPANCREATOGRAM N/A 2015    Procedure: ENDOSCOPIC RETROGRADE CHOLANGIOPANCREATOGRAM;  Surgeon: Mandeep Park MD;  Location:  OR    ENDOSCOPIC RETROGRADE CHOLANGIOPANCREATOGRAM N/A 2016    Procedure: COMBINED ENDOSCOPIC RETROGRADE CHOLANGIOPANCREATOGRAPHY, PLACE TUBE/STENT;   Surgeon: Mandeep Park MD;  [facility]    ENDOSCOPIC RETROGRADE CHOLANGIOPANCREATOGRAM N/A 03/17/2016    Procedure: COMBINED ENDOSCOPIC RETROGRADE CHOLANGIOPANCREATOGRAPHY, REMOVE FOREIGN BODY OR STENT/TUBE;  Surgeon: Mandeep Park MD;  [facility]    ENDOSCOPIC RETROGRADE CHOLANGIOPANCREATOGRAM N/A 08/02/2016    Procedure: COMBINED ENDOSCOPIC RETROGRADE CHOLANGIOPANCREATOGRAPHY, PLACE TUBE/STENT;  Surgeon: Mandeep Park MD;  [facility]    ENDOSCOPIC RETROGRADE CHOLANGIOPANCREATOGRAM N/A 08/26/2016    Procedure: COMBINED ENDOSCOPIC RETROGRADE CHOLANGIOPANCREATOGRAPHY, REMOVE FOREIGN BODY OR STENT/TUBE;  Surgeon: Mandeep Park MD;  [facility]    ENDOSCOPIC ULTRASOUND UPPER GASTROINTESTINAL TRACT (GI) N/A 10/03/2016    Procedure: ENDOSCOPIC ULTRASOUND, ESOPHAGOSCOPY / UPPER GASTROINTESTINAL TRACT (GI);  Surgeon: Guru Jose Rodas MD;  Location:  OR    ENT SURGERY  1989    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    ENTEROSCOPY SMALL BOWEL N/A 02/03/2022    Procedure: ENTEROSCOPY with possible fistula closure;  Surgeon: Francisco Dodson MD;  Location:  GI    ESOPHAGOSCOPY, GASTROSCOPY, DUODENOSCOPY (EGD), COMBINED N/A 06/24/2015    Procedure: COMBINED ESOPHAGOSCOPY, GASTROSCOPY, DUODENOSCOPY (EGD), REMOVE FOREIGN BODY;  Surgeon: Mandeep Park MD;  Location:  GI    ESOPHAGOSCOPY, GASTROSCOPY, DUODENOSCOPY (EGD), COMBINED N/A 10/25/2015    Procedure: COMBINED ESOPHAGOSCOPY, GASTROSCOPY, DUODENOSCOPY (EGD);  Surgeon: Sammy Amaro MD;  Location:  GI    ESOPHAGOSCOPY, GASTROSCOPY, DUODENOSCOPY (EGD), COMBINED N/A 10/25/2015    Procedure: COMBINED ESOPHAGOSCOPY, GASTROSCOPY, DUODENOSCOPY (EGD), BIOPSY SINGLE OR MULTIPLE;  Surgeon: Sammy Amaro MD;  Location:  GI    ESOPHAGOSCOPY, GASTROSCOPY, DUODENOSCOPY (EGD), COMBINED N/A 01/31/2023    Procedure: ESOPHAGOGASTRODUODENOSCOPY (EGD) with peg replacement ;  Surgeon:  Mandeep Park MD;  [facility]    ESOPHAGOSCOPY, GASTROSCOPY, DUODENOSCOPY (EGD), DILATATION, COMBINED      EXCISE LESION TRUNK N/A 04/17/2017    Procedure: EXCISE LESION TRUNK;  Removal of Abdominal Foreign Body;  Surgeon: Nestor Phoenix MD;  [facility]    HC ESOPH/GAS REFLUX TEST W NASAL IMPED >1 HR N/A 11/19/2015    Procedure: ESOPHAGEAL IMPEDENCE FUNCTION TEST WITH 24 HOUR PH GREATER THAN 1 HOUR;  Surgeon: Thiago Apple MD;  Location: [facility] GI    HC UGI ENDOSCOPY DIAG W BIOPSY  09/17/2008    HC UGI ENDOSCOPY DIAG W BIOPSY  09/27/2012    HC UGI ENDOSCOPY W ESOPHAGEAL DILATION BALLOON <30MM  09/17/2008    HC UGI ENDOSCOPY W EUS N/A 05/05/2015    Procedure: COMBINED ENDOSCOPIC ULTRASOUND, ESOPHAGOSCOPY, GASTROSCOPY, DUODENOSCOPY (EGD);  Surgeon: Wm Dueñas MD;  [facility]: [facility]    HC WRIST ARTHROSCOP,RELEASE XVERS LIG Bilateral 12/17/2008    INJECT TRANSVERSUS ABDOMINIS PLANE (TAP) BLOCK BILATERAL Left 09/22/2016    Procedure: INJECT TRANSVERSUS ABDOMINIS PLANE (TAP) BLOCK BILATERAL;  Surgeon: Dickson Corrigan MD;  [facility]    INJECT TRIGGER POINT Bilateral 09/08/2022    Procedure: Abdominal trigger point injection with ultrasound;  Surgeon: Monika Mahajan MD;  Location: [facility]    INJECT TRIGGER POINT SINGLE / MULTIPLE 3 OR MORE MUSCLES Right 11/15/2022    Procedure: Trigger point injections right abdomen with ultrasound guidance;  Surgeon: Monika Mahajan MD;  Location: [facility] OR    IR CHEST PORT PLACEMENT < 5 YRS OF AGE  06/10/2022    IR CVC TUNNEL PLACEMENT > 5 YRS OF AGE  4/15/2024    IR PORT REMOVAL RIGHT  11/09/2023    laparoscopic pineda  01/01/1995    LAPAROSCOPIC HERNIORRHAPHY INCISIONAL N/A 08/23/2018    Procedure: LAPAROSCOPIC HERNIORRHAPHY INCISIONAL;  Laparoscopic Incisional Hernia Repair with Symbotex Mesh Implant;  Surgeon: Nestor Phoenix MD;  [facility]    LAPAROSCOPIC PANCREATECTOMY, TRANSPLANT AUTO ISLET CELL N/A 12/28/2016    Procedure: LAPAROSCOPIC  PANCREATECTOMY, TRANSPLANT AUTO ISLET CELL;  Surgeon: Nestor Phoenix MD;  [facility]    LAPAROTOMY EXPLORATORY N/A 01/31/2023    Procedure: Exploratory Laparotomy, lysis of adhesions, Perforated J-Junostomy Resection, Replace J-Junostomy site;  Surgeon: Elver Bauer MD;  [facility]    LAPAROTOMY, LYSIS ADHESIONS, COMBINED N/A 01/31/2023    Procedure: Dilatation of jejunostomy feeding tube, track with placement of jejunostomy tube with fluoroscopy;  Surgeon: Elver Bauer MD;  [facility]    PICC DOUBLE LUMEN PLACEMENT Left 11/13/2023    Basilic Vein 5F DL 43 cm    REMOVE AND REPLACE BREAST IMPLANT PROSTHESIS N/A 12/30/2022    Procedure: Exploratory Laparotomy, lysis of adhesions, small bowel resection. Placement of gastric jejunostomy for enteral feeding.;  Surgeon: Elver Bauer MD;  [facility]    REMOVE GASTROSTOMY TUBE ADULT N/A 01/28/2022    Procedure: REMOVAL, GASTROSTOMY TUBE, ADULT;  Surgeon: Mandeep Park MD;  [facility]    REPLACE GASTROJEJUNOSTOMY TUBE, PERCUTANEOUS N/A 09/07/2021    Procedure: GASTROJEJUNOSTOMY TUBE PLACEMENT, PERCUTANEOUS, WITH GASTROPEXY;  Surgeon: Mandeep Park MD;  [facility]    REPLACE GASTROJEJUNOSTOMY TUBE, PERCUTANEOUS N/A 09/22/2021    Procedure: REPLACEMENT, GASTROJEJUNOSTOMY TUBE, PERCUTANEOUS;  Surgeon: Zackery Montoya MD;  [facility]    REPLACE GASTROJEJUNOSTOMY TUBE, PERCUTANEOUS N/A 11/22/2022    Procedure: REPLACEMENT, GASTROJEJUNOSTOMY TUBE, PERCUTANEOUS;  Surgeon: Mandeep Park MD;  [facility]    REPLACE GASTROJEJUNOSTOMY TUBE, PERCUTANEOUS N/A 12/09/2022    Procedure: REPLACEMENT, GASTROJEJUNOSTOMY TUBE, PERCUTANEOUS with ENDOSCOPIC SUTURING.;  Surgeon: Mandeep Park MD;  [facility]    REPLACE GASTROJEJUNOSTOMY TUBE, PERCUTANEOUS N/A 08/01/2023    Procedure: Replace Gastrojejunostomy Tube, Percutaneous;  Surgeon: Mandeep Park MD;  [facility]    REPLACE GASTROJEJUNOSTOMY TUBE, PERCUTANEOUS N/A 2023    Procedure: Replace Gastrojejunostomy Tube, Percutaneous;  Surgeon: Francisco Dodson MD;  [facility]    REPLACE GASTROJEJUNOSTOMY TUBE, PERCUTANEOUS N/A 2023    Procedure: Replace Gastrojejunostomy Tube, Percutaneous;  Surgeon: Mandeep Park MD;  [facility]    REPLACE JEJUNOSTOMY TUBE, PERCUTANEOUS N/A 09/10/2021    Procedure: UPPER ENDOSCOPY, REPLACEMENT OF PERCUTANEOUS GASTROJEJUNOSTOMY TUBE, T-TAG GASTROPEXY;  Surgeon: Zackery Montoya MD;  [facility]    REPLACE JEJUNOSTOMY TUBE, PERCUTANEOUS N/A 2021    Procedure: REPLACEMENT, JEJUNOSTOMY TUBE, PERCUTANEOUS;  Surgeon: Mandeep Park MD;  [facility]    REPLACE JEJUNOSTOMY TUBE, PERCUTANEOUS N/A 2023    Procedure: REPLACEMENT, JEJUNOSTOMY TUBE, PERCUTANEOUS;  Surgeon: Mandeep Park MD;  [facility]    REPLACE JEJUNOSTOMY TUBE, PERCUTANEOUS  2023    Procedure: Replace Jejunostomy Tube, Percutaneous;  Surgeon: Mandeep Park MD;  [facility]    REPLACE JEJUNOSTOMY TUBE, PERCUTANEOUS N/A 2024    Procedure: REPLACEMENT, JEJUNOSTOMY TUBE, PERCUTANEOUS;  Surgeon: Francisco Dodson MD;  [facility]    transphenoidal pituitary resection  1990    [facility]  DELIVERY ONLY  1996    [facility]  DELIVERY ONLY  1998    repeat c section with incidental cystotomy with repair    [facility] EXCIS PITUITARY,TRANSNASAL/SEPTAL  1980    pituitary tumor removed for diabetes insipidus    [facility] TOTAL ABDOM HYSTERECTOMY  1999    w/ bilateral salpingoophorectomy        Problem list:    Patient Active Problem List    Diagnosis Date Noted    Bacteremia 11/10/2023     Priority: Medium    Abdominal pain, unspecified abdominal location 2023     Priority: Medium    Cholangitis (H28) 2023     Priority: Medium    On total parenteral nutrition (TPN) 2023     Priority: Medium    Fever,  unspecified fever cause 06/04/2023     Priority: Medium    Chronic pancreatitis, unspecified pancreatitis type (H) 06/04/2023     Priority: Medium    History of food intolerance 05/09/2023     Priority: Medium    Malnutrition, unspecified type (H24) 05/09/2023     Priority: Medium    Nausea and vomiting, unspecified vomiting type 05/09/2023     Priority: Medium    Major depression, single episode 03/06/2023     Priority: Medium    Acute postoperative abdominal pain 01/31/2023     Priority: Medium    Feeding intolerance 12/30/2022     Priority: Medium    Myofascial pain 10/14/2022     Priority: Medium     Added automatically from request for surgery [id number]      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  pancreas and spleen removed, islet cells transplanted into liver    3 classes of vaccines needed .    1. Pneumococcal vaccines are as follows:       PCV 13 - one time dose (was given 2017)       PPSV23 - (given 12/1/16); repeat q 5 years      ROLE of PCV 20???    2. Meningococcal vaccines     Menactra - (last given 12/1/16) -  repeat q 5 yrs   NOTE: need to wait 4 wks after PCV 13 has been given.   OR - give Menveo instead as it can be given same time as PCV 13    AND   Bexcero - (got 12/9/16) - does not need to be repeated.     3. The Hib vaccine - (got 12/9/16) - does not need to be repeated.      Poisoning by drug 05/09/2022     Priority: Medium     Formatting of this note might be different from the original.  Per Care Everywhere review:  All oral, IV and injectable steroids are contraindicated (unless in life threatening situations) in Islet Auto transplant recipients. They can cause irreversible loss of islet cell function. Please contact patient's transplant care coordinator ADI Gaffney RN at [phone]/pager [phone] and/or endocrinologist prior to administration.      Type 1 diabetes mellitus without complication (H)  05/09/2022     Priority: Medium     Formatting of this note might be different from the original.  ACTUALLY - DM 3c - pathogenic diabetes as no pancreas.  (pancreas and spleen removed, islet cells transplanted into liver)    Seeing ENDO at [facility] - Dr Erum Melissa      Intra-abdominal abscess (H) 12/03/2021     Priority: Medium    Postprocedural intraabdominal abscess 12/03/2021     Priority: Medium    Gastrostomy tube obstruction (H) 11/27/2021     Priority: Medium    Abdominal pain, generalized 09/18/2021     Priority: Medium    Adult failure to thrive 08/16/2021     Priority: Medium     Added automatically from request for surgery [id number]      Hypoglycemia 08/05/2021     Priority: Medium    Iron deficiency anemia 03/22/2019     Priority: Medium    Headache, chronic migraine without aura 09/18/2018     Priority: Medium    Chronic pain syndrome 09/18/2018     Priority: Medium    S/P hernia repair 08/23/2018     Priority: Medium    Incisional hernia, without obstruction or gangrene 05/20/2018     Priority: Medium    Adhesive capsulitis of shoulder, unspecified laterality 11/14/2017     Priority: Medium    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  Excess IgG4  [facility]      Gastroparesis      Priority: Medium    ACP (advance care planning) 03/28/2017     Priority: Medium     Advance Care Planning 3/28/2017: Receipt of ACP document:  Received: Health Care Directive which was witnessed or notarized on 12/8/16.  Document previously scanned on 1/12/17.  Validation form completed and scanned.  Code Status reflects choices in most recent ACP document..  Confirmed/documented designated decision maker(s).  Added by May Baires   Advance Care Planning Liaison              Pancreatic insufficiency 01/17/2017     Priority: Medium     Diabetes insipidus (H24) 01/05/2017     Priority: Medium     Formatting of this note might be different from the original.  Diabetes Insipidus (DI)  Per external records.  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  Depression NOS      Other type of intractable migraine      Priority: Medium     Diagnosis updated by automated process. Provider to review and confirm.      GERD (gastroesophageal reflux disease) 12/01/2010     Priority: Medium    Lumbago 04/18/2005     Priority: Medium     History of L5-S1 degenerative disk disease.        Sensorineural hearing loss 01/10/2005     Priority: Medium     Problem list name updated by automated process. Provider to review      Vertiginous syndrome and labyrinthine disorder 01/10/2005     Priority: Medium     Problem list name updated by automated process. Provider to review  IMO Regulatory Load OCT 2020      Sprain and strain of other specified sites of hip and thigh 12/09/2002     Priority: Medium    Chondromalacia of patella 12/09/2002     Priority: Medium    Need for prophylactic immunotherapy      Priority: Medium     trees, grass, srw, dust mites, cat      Allergic rhinitis due to other allergen 12/21/2001     Priority: Medium    Hypothyroidism      Priority: Medium     Problem list name updated by automated process. Provider to review         Medications:  Current Outpatient Medications   Medication Sig Dispense Refill    acetaminophen (TYLENOL) 325 MG/10.15ML solution 20.3 mLs (650 mg) by Per J Tube route every 6 hours as needed for mild pain or fever 473 mL 4    baclofen (LIORESAL)  "10 MG tablet 1-2 tablets (10-20 mg) by Per G Tube route 3 times daily 60 tablet 1    buprenorphine (SUBUTEX) 2 MG SUBL sublingual tablet Place 2 mg under the tongue 2 times daily      cetirizine (ZYRTEC) 10 MG tablet 1 tablet (10 mg) by Per G Tube route 2 times daily 60 tablet 11    COMPOUNDED NON-CONTROLLED SUBSTANCE (CMPD RX) - PHARMACY TO MIX COMPOUNDED MEDICATION Administer 40 mg amitriptyline suspension (2 mg/mL) via G-J tube at bedtime. 600 mL 5    COMPOUNDED NON-CONTROLLED SUBSTANCE (CMPD RX) - PHARMACY TO MIX COMPOUNDED MEDICATION Place 1 enema rectally 2 times daily as needed (for constipation) 60 each 3    Continuous Blood Gluc Sensor (OxTheraSTYLE LISA 3 SENSOR) MISC CHANGE EVERY 14 DAYS 2 each 11    Digestive Enzyme Cartridge (RELIZORB) MARCO 2 Cartridges daily 60 each 6    diphenhydrAMINE (BENADRYL) 12.5 MG/5ML solution Take 25 mg by mouth 4 times daily as needed for other (nausea)      droNABinol (SYNDROS) 5 MG/ML oral soln Take 0.5 mLs (2.5 mg) by mouth 2 times daily 60 mL 0    DULoxetine HCl 60 MG CSDR 1 capsule (60 mg) by Per J Tube route daily Sprinkle caps for feeding tube 30 capsule 1    estradiol (VAGIFEM) 10 MCG TABS vaginal tablet INSERT 1 TABLET(10 MCG) VAGINALLY 2 TIMES A WEEK 24 tablet 2    famotidine (PEPCID) 40 MG/5ML suspension 2.5 mLs (20 mg) by Per J Tube route daily 50 mL 4    glucagon 1 MG kit Give 0.1 to 0.15mg( 10-15 units on Insulin sryinge) subcutaneous  every 15 minutes PRN for hypoglycemia. Remix new kit q24hr. Needs up to 3 kit/week. 10 each 3    glucose 40 % GEL gel Take 15-30 g by mouth every 15 minutes as needed for low blood sugar 3 Tube 2    insulin aspart (NOVOLOG PEN) 100 UNIT/ML pen Take 1 unit if BG is 175-275, and 2 units if >275 up to every 4 hours as needed.  \"Prime\" pen needle with 2 unit airshot before giving, needs supply for 15 unit/day. 15 mL 11    insulin detemir (LEVEMIR PEN) 100 UNIT/ML pen Take 7 to 10 units per day, adjust as directed by MD.  Do 2 unit " "'prime' before dosing, needs 12 units/day supply. 15 mL 5    insulin syringe-needle U-100 (30G X 1/2\" 0.3 ML) 30G X 1/2\" 0.3 ML miscellaneous Use 3 syringes daily or as directed. 100 each 11    lactated ringers infusion Inject 1,000 mLs into the vein daily as needed      levothyroxine (SYNTHROID) 25 mcg/mL SUSP 5.48 mLs (137 mcg) by Per J Tube route daily 500 mL 3    lidocaine (LIDODERM) 5 % patch Place onto the skin every 24 hours To prevent lidocaine toxicity, patient should be patch free for 12 hrs daily.      lipase-protease-amylase (CREON) 53[address]-876[address]-424095 units CPEP per EC capsule Take 3-4 capsules by mouth 3 times daily (with meals) With oral meals 150 capsule 11    methocarbamol (ROBAXIN) 750 MG tablet Take 1 tablet (750 mg) by mouth 4 times daily as needed for muscle spasms 120 tablet 11    MOTEGRITY 2 MG tablet TAKE ONE TABLET BY MOUTH ONCE DAILY 30 tablet 6    naloxegol (MOVANTIK) 12.5 MG TABS tablet Take 25 mg by mouth every morning (before breakfast)      Nutritional Supplements (BOOST HIGH PROTEIN) LIQD After above baseline labs are drawn, give: 6 mL/kg to maximum of 360 mL; the beverage is to be consumed within 5 minutes.  0    pantoprazole (PROTONIX) 40 mg IV push injection Inject 40 mg into the vein daily (with breakfast) 30 each 11    prochlorperazine (COMPAZINE) 10 MG tablet Take 1 tablet (10 mg) by mouth every 6 hours as needed for nausea or vomiting 120 tablet 3    promethazine 25 mg Inject 25 mg into the vein 3 times daily      rimegepant (NURTEC) 75 MG ODT tablet Place 1 tablet (75 mg) under the tongue daily as needed for migraine Maximum of 1 tablet every 24 hours. 8 tablet 11    scopolamine (TRANSDERM) 1 MG/3DAYS 72 hr patch Place 1 patch onto the skin every 72 hours - Transdermal 10 patch 11    sennosides (SENOKOT) 8.8 MG/5ML syrup 5 mLs by Per J Tube route 2 times daily 236 mL 11    Sharps Container (BD SHARPS ) MISC 1 Container as needed 1 each 1    silver nitrate (ARZOL SILVER " "NIT APPLICATORS) 75-25 % miscellaneous Apply topically as needed for wound care Apply Silver Nitrate Sticks every 2-3 days until granulation tissue resolved.  \"Roll\" tip of Silver Nitrate Q tip directly onto the granulation tissue-bulging tissue area.  Have Agency or Home Care Nurse administer this, if you prefer.  Take care not to touch any healthy tissue or skin.  The tissue will turn white and eventually black & scab off.  May repeat if needed in the future for recurrence. 30 applicator 0    sodium phosphate, mineral oil, magnesium citrate enema Instill 1 pink 187 ml enema twice daily as needed for constipation      STATIN NOT PRESCRIBED (INTENTIONAL) Previous liver issues, risks vs benefits felt to not warrant statin.    Discussed Oct 2022 visit      zinc oxide - white petrolatum (CRITIC-AID THICK MOIST BARRIER) 20-51% PSTE topical paste Apply 71 g topically every hour as needed for rash (Under J tube bumper when needed for skin protection) 170 g 0    ZOLMitriptan (ZOMIG-ZMT) 5 MG ODT Take 1 tablet (5 mg) by mouth at onset of headache for migraine Dissolve tablet in the mouth. May repeat in 2 hours. Max 2 tablets/24 hours. 12 tablet 6    COMPRO 25 MG suppository INSERT ONE SUPPOSITORY RECTALLY EVERY 12 HOURS AS NEEDED FOR NAUSEA 10 suppository 1       Allergies:  Allergies   Allergen Reactions    Apple Juice Anaphylaxis    Corticosteroids Other (See Comments)     All oral, IV and injectable steroids are contraindicated (unless in life threatening situations) in [facility] transplant recipients. They can cause irreversible loss of islet cell function. Please contact patient's transplant care coordinator ADI Gaffney RN at [phone]/pager [phone] and/or endocrinologist prior to administration.      Depakote [Divalproex Sodium] Other (See Comments)     Chest pain    Zithromax [Azithromycin Dihydrate] Anaphylaxis     Noted in 4/7/08 ER    Bromfenac      Other reaction(s): Headache    Codeine      Other " reaction(s): Hallucinations    Darvocet [Propoxyphene N-Apap] Itching    Morphine Nausea and Vomiting and Rash    Nalbuphine Hcl Rash     RASH :nubaine    Zosyn [Piperacillin-Tazobactam In Dex] Rash     Possible allergy, did have a diffuse rash that seemed drug related but could have also been related to soap in the hospital.     Bactrim [Sulfamethoxazole W-Trimethoprim] Other (See Comments) and Nausea and Vomiting     Severely low liver function.    Statins Other (See Comments)     Previous liver issues, risks vs benefits felt to not warrant statin.  Discussed Oct 2022 visit    Tape [Adhesive Tape] Blisters    Tramadol     Zofran [Ondansetron] Other (See Comments)     migraine    Flagyl [Metronidazole] Hives and Rash       Family history:  Family History   Problem Relation Age of Onset    Lipids Mother     Hypertension Mother     Thyroid Disease Mother     Depression Mother     Angina Mother     GERD Mother     Skin Cancer Mother     Migraines Mother     Autoimmune Disease Mother     Hyperlipidemia Mother     Mental Illness Mother     Cerebrovascular Disease Mother         11/2023    Other Cancer Mother         skin-basil cell    Anxiety Disorder Mother     Eye Disorder Father         cataract, detached retina    Myocardial Infarction Father 60    Lipids Father     Cerebrovascular Disease Father     Depression Father     Substance Abuse Father     Anesthesia Reaction Father         stroke right after surgery    Cataracts Father     Osteoarthritis Father     Ulcerative Colitis Father     Autoimmune Disease Father     Heart Disease Father     Hyperlipidemia Father     Mental Illness Father     Other Cancer Father         myloma    Anxiety Disorder Father     Interpersonal Violence Sister     Coronary Artery Disease Sister         angioplasty    GERD Sister     Substance Abuse Sister     Cerebrovascular Disease Sister         2005    Depression Sister     Thyroid Disease Sister     Eye Disorder Maternal Grandmother          cataract    Thyroid Disease Maternal Grandmother     Diabetes Maternal Grandfather     Eye Disorder Paternal Grandmother         cataract    Diabetes Paternal Grandmother     Eye Disorder Paternal Grandfather         cataract    Diabetes Paternal Grandfather     Substance Abuse Paternal Grandfather     Eye Disorder Son         ptosis    Depression Son     Anxiety Disorder Son     Heart Disease Paternal Aunt     Diabetes Paternal Aunt     Diabetes Paternal Uncle     Heart Disease Paternal Uncle     Depression Nephew     Anxiety Disorder Nephew     Thyroid Disease Nephew     Diabetes Type 2  Cousin         paternal cousin    Autoimmune Disease Cousin     Autoimmune Disease Sister     Depression Sister     Mental Illness Sister     Substance Abuse Sister     Thyroid Disease Sister     Depression Son     Mental Illness Son     Anxiety Disorder Son     Thyroid Disease Nephew     Anxiety Disorder Nephew        Social history:  Social History     Socioeconomic History    Marital status:      Spouse name: Norris    Number of children: 3    Years of education: 16    Highest education level: Not on file   Occupational History    Occupation: director     Employer: [employer]   Tobacco Use    Smoking status: Former     Current packs/day: 0.00     Average packs/day: 0.5 packs/day for 6.3 years (3.2 ttl pk-yrs)     Types: Cigarettes     Start date: 1985     Quit date: 1992     Years since quittin.3    Smokeless tobacco: Not on file    Tobacco comments:     no 2nd hand   Vaping Use    Vaping status: Some Days    Substances: THC, CBD   Substance and Sexual Activity    Alcohol use: Not Currently     Alcohol/week: 3.0 - 6.0 standard drinks of alcohol     Types: 1 - 2 Glasses of wine, 1 - 2 Cans of beer, 1 - 2 Shots of liquor per week     Comment: none since IGG    Drug use: No     Comment: medical canabis tincture and vape    Sexual activity: Yes     Partners: Male     Birth control/protection: Male Surgical,  Female Surgical, None     Comment: Hystectomy 1999   Other Topics Concern    Parent/sibling w/ CABG, MI or angioplasty before 65F 55M? No     Service Not Asked    Blood Transfusions No    Caffeine Concern Not Asked    Occupational Exposure Not Asked    Hobby Hazards Not Asked    Sleep Concern Not Asked    Stress Concern Not Asked    Weight Concern Not Asked    Special Diet Not Asked    Back Care Not Asked    Exercise Not Asked    Bike Helmet Not Asked    Seat Belt Yes    Self-Exams Not Asked   Social History Narrative     with 3 children and a dog.  No smoking, etoh or drug use.  Worked as a  for [employer] in [place] in the past.  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     Social Determinants of Health     Financial Resource Strain: Low Risk  (4/26/2024)    Financial Resource Strain     Within the past 12 months, have you or your family members you live with been unable to get utilities (heat, electricity) when it was really needed?: No   Food Insecurity: Low Risk  (4/26/2024)    Food Insecurity     Within the past 12 months, did you worry that your food would run out before you got money to buy more?: No     Within the past 12 months, did the food you bought just not last and you didn t have money to get more?: No   Transportation Needs: Low Risk  (4/26/2024)    Transportation Needs     Within the past 12 months, has lack of transportation kept you from medical appointments, getting your medicines, non-medical meetings or appointments, work, or from getting things that you need?: No   Physical Activity: Sufficiently Active (3/19/2024)    Exercise Vital Sign     Days of Exercise per Week: 4 days     Minutes of Exercise per Session: 40 min   Stress: Stress Concern Present (3/19/2024)    [employer] [place] [employer] - Occupational Stress Questionnaire     Feeling of Stress : To some extent   Social Connections: Unknown (3/19/2024)     Social Connection and Isolation Panel [NHANES]     Frequency of Communication with Friends and Family: Not on file     Frequency of Social Gatherings with Friends and Family: Once a week     Attends Sabianism Services: Not on file     Active Member of Clubs or Organizations: Not on file     Attends Club or Organization Meetings: Not on file     Marital Status: Not on file   Interpersonal Safety: Low Risk  (3/20/2024)    Interpersonal Safety     Do you feel physically and emotionally safe where you currently live?: Yes     Within the past 12 months, have you been hit, slapped, kicked or otherwise physically hurt by someone?: No     Within the past 12 months, have you been humiliated or emotionally abused in other ways by your partner or ex-partner?: No   Housing Stability: Low Risk  (4/26/2024)    Housing Stability     Do you have housing? : Yes     Are you worried about losing your housing?: No         Nursing Notes:   Herbie Kasper, EMT  5/15/2024  5:48 PM  Signed  Chief Complaint   Patient presents with    Consult       Vitals:    05/15/24 1746   BP: 128/77   BP Location: Left arm   Patient Position: Sitting   Cuff Size: Adult Regular   Pulse: 87   SpO2: 97%       There is no height or weight on file to calculate BMI.    Herbie Kasper EMT-P\       Physical Examination:  /77 (BP Location: Left arm, Patient Position: Sitting, Cuff Size: Adult Regular)   Pulse 87   SpO2 97%   General: alert, oriented, in no acute distress  Abdomen: flat, well healed midline scar, G and J tubes present.        Again, thank you for allowing me to participate in the care of your patient.      Sincerely,    Kaylene Branch MD

## 2024-05-15 NOTE — PATIENT INSTRUCTIONS
Follow up:    Our schedulers will give you a call to set up a visit with Aleyda ANDERSON  Call me if you would like to move forward with surgery     MONICA Zambrano [phone]    Clinic Fax Number [phone]    My Chart is available 24 hours a day and is a secure way to access your records and communicate with your care team.  I strongly recommend signing up if you haven't already done so, if you are comfortable with computers.  If you would like to inquire about this or are having problems with My Chart access, you may call [phone] or go online at [email or web address].  Please allow at least 24 hours for a response and extra time on weekends and Holidays.

## 2024-05-15 NOTE — NURSING NOTE
Chief Complaint   Patient presents with    Consult       Vitals:    05/15/24 1746   BP: 128/77   BP Location: Left arm   Patient Position: Sitting   Cuff Size: Adult Regular   Pulse: 87   SpO2: 97%       There is no height or weight on file to calculate BMI.    Herbei Kasper EMT-P\

## 2024-05-17 ENCOUNTER — TELEPHONE (OUTPATIENT)
Dept: WOUND CARE | Facility: CLINIC | Age: 59
End: 2024-05-17
Payer: COMMERCIAL

## 2024-05-17 NOTE — TELEPHONE ENCOUNTER
Patient confirmed scheduled appointment:  Date: 6/4/24  Time: 8:30 am  Visit type: P New Ostomy Nurse  Provider: Aleyda Ceja  Location: [facility]  Testing/imaging: n/a  Additional notes: referred by Dr. Branch

## 2024-05-22 ASSESSMENT — HEADACHE IMPACT TEST (HIT 6)
HOW OFTEN DID HEADACHS LIMIT CONCENTRATION ON WORK OR DAILY ACTIVITY: ALWAYS
HOW OFTEN HAVE YOU FELT TOO TIRED TO WORK BECAUSE OF YOUR HEADACHES: VERY OFTEN
HOW OFTEN DO HEADACHES LIMIT YOUR DAILY ACTIVITIES: VERY OFTEN
HIT6 TOTAL SCORE: 67
HOW OFTEN HAVE YOU FELT FED UP OR IRRITATED BECAUSE OF YOUR HEADACHES: SOMETIMES
WHEN YOU HAVE A HEADACHE HOW OFTEN DO YOU WISH YOU COULD LIE DOWN: VERY OFTEN
WHEN YOU HAVE HEADACHES HOW OFTEN IS THE PAIN SEVERE: VERY OFTEN

## 2024-05-23 ENCOUNTER — OFFICE VISIT (OUTPATIENT)
Dept: NEUROLOGY | Facility: CLINIC | Age: 59
End: 2024-05-23
Payer: COMMERCIAL

## 2024-05-23 ENCOUNTER — PATIENT OUTREACH (OUTPATIENT)
Dept: CARE COORDINATION | Facility: CLINIC | Age: 59
End: 2024-05-23

## 2024-05-23 ENCOUNTER — TRANSFERRED RECORDS (OUTPATIENT)
Dept: HEALTH INFORMATION MANAGEMENT | Facility: CLINIC | Age: 59
End: 2024-05-23

## 2024-05-23 DIAGNOSIS — G43.709 CHRONIC MIGRAINE W/O AURA W/O STATUS MIGRAINOSUS, NOT INTRACTABLE: Primary | ICD-10-CM

## 2024-05-23 PROCEDURE — 64615 CHEMODENERV MUSC MIGRAINE: CPT | Performed by: NURSE PRACTITIONER

## 2024-05-23 NOTE — PROGRESS NOTES
Clinic Care Coordination Contact    Situation: Patient chart reviewed by care coordinator.    Background: Patient enrolled in Care Coordination.     Assessment: [employer] message sent to patient regarding CC follow up.     Plan/Recommendations: RN will await pt's reply for further outreach.     DARIELA ADAMS RN on 5/23/2024 at 3:48 PM    Addendum:  RN scheduled follow up CC call with patient for 5/30 at 9:00am.    DARIELA ADAMS RN on 5/28/2024 at 12:00 PM

## 2024-05-23 NOTE — PROGRESS NOTES
"PROCEDURE NOTE:     [facility]  Botulinum Toxin Procedure     Headache Neurology     05/23/24       Procedure:  OnabotulinumtoxinA injections for chronic migraine  Indication:  Chronic migraine     Dinora suffers from severe intractable headaches.  She was referred by for onabotulinumtoxinA injections for headache.  Risks, benefits, and alternatives were discussed.  All questions were answered and consent given.  She decided to proceed with the injections.      Headache history, from initial consult note: See Initial Headache Clinic visit on 8/3/2022 for details     Prior to initiation of botulinum toxin injections, Ms. Mcghee reported 13-15 headache days per month, with 3-4 severe headache days per month. Her headaches are quite disabling and often interfere with her ability to function normally.     Date of last injections:  02/29/24    Ms. Mcghee reports 2 headaches in March, 3 headaches in April, 9 headaches in May with 5 headaches in last week before Botox.  She has noticed a wearing off phenomenon prior to this round of botulinum toxin injections, lasting 2-3 weeks.     Botulinum toxin injections have improved their functioning: able to read better, walking and not in bed      She has attempted other migraine prophylactic treatments in the past, which have included: Amitriptyline for 15 years and stopped   Topiramate  Sumatriptan   lyrica-\"brain fog\"  depakote -allergies  Compazine +benedryl  Promethazine IV three times per week for   scopalamine patch     She currently takes amitriptyline, promethazine, zolmitriptine for headache prevention.     Ms. Skinners pain was assessed prior to the procedure.  She rated her pain today as 4 out of 10.     Procedural Pause: Procedural pause was conducted to verify correct patient identity, procedure to be performed, correct side and site, correct patient position, and special requirements. Appropriate hand hygiene was utilized, and each injection site was prepped " with alcohol wipe or Chloraprep swab.      Procedure Details: 200 units of onabotulinumtoxinA was diluted in 4 mL 0.9% normal saline. A total of 155 units of onabotulinumtoxinA were injected using 30 gauge 0.5 in needles into the muscles listed below. 45 units of onabotulinumtoxinA were wasted.         GOAL OF PROCEDURE:  The goal of this procedure is to decrease pain and enhance functional independence.     CONSENT:  The risks, benefits, and treatment options were discussed with the patient who agreed to proceed.     Written consent was obtained      EQUIPMENT USED:  Needles-30 gauge, 0.5 inches for injections  Four 1-ml tuberculin syringes for injections  One sodium chloride 10 ml vial preservative free  Alcohol swabs     SKIN PREPARATION:  Skin preparation was performed using an alcohol wipe.        AREA/MUSCLE INJECTED:  155 units of Botox  Right upper Trapezius (upper cervical) - 5 units of Botox at 3 site/s.   Left upper Trapezius (upper cervical) - 5 units of Botox at 3 site/s.      Right cervical paraspinals - 5 units of Botox at 2 site/s.   Left cervical paraspinals - 5 units of Botox at 2 site/s.      Left occipitalis - 5 units of Botox at 3 site/s.  Right occipitalis -5 units of Botox at 3 site/s     Right Frontalis - 5 units of Botox at 2 site/s.  Left Frontalis - 5 units of Botox at 2 site/s.     Right Temporalis - 5 units of Botox at 4 site/s.  Left Temporalis - 5 units of Botox at 4 site/s.     Right  - 5 units of Botox at 1 site/s.              Left  - 5 units of Botox at 1 site/s.     Procerus - 5 units of Botox at 1 site/s.     RESPONSE TO PROCEDURE:  tolerated the procedure well and there were no immediate complications.  Patient was allowed to recover for an appropriate period of time and was discharged home in stable condition.     Left PICC -patient asked to take a look at her PICC site. Painful when bumps into something but otherwise no pain, no erythema, edema. 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 She will observed and goes to ED if any problems      FOLLOW UP:     Repeat Botox injections in 12 weeks        This procedure was performed under a hospital privileging agreement with Dr. Finn العلي, NATALY, [facility]

## 2024-05-23 NOTE — LETTER
"5/23/2024       RE: Dinora Mcghee  [address] [facility] [address]     Dear Colleague,    Thank you for referring your patient, Dinora Mcghee, to the [facility] [place] at [facility]. Please see a copy of my visit note below.    PROCEDURE NOTE:     [facility]  Botulinum Toxin Procedure     Headache Neurology     05/23/24       Procedure:  OnabotulinumtoxinA injections for chronic migraine  Indication:  Chronic migraine     Dinora suffers from severe intractable headaches.  She was referred by for onabotulinumtoxinA injections for headache.  Risks, benefits, and alternatives were discussed.  All questions were answered and consent given.  She decided to proceed with the injections.      Headache history, from initial consult note: See Initial Headache Clinic visit on 8/3/2022 for details     Prior to initiation of botulinum toxin injections, Ms. Mcghee reported 13-15 headache days per month, with 3-4 severe headache days per month. Her headaches are quite disabling and often interfere with her ability to function normally.     Date of last injections:  02/29/24    Ms. Mcghee reports 2 headaches in March, 3 headaches in April, 9 headaches in May with 5 headaches in last week before Botox.  She has noticed a wearing off phenomenon prior to this round of botulinum toxin injections, lasting 2-3 weeks.     Botulinum toxin injections have improved their functioning: able to read better, walking and not in bed      She has attempted other migraine prophylactic treatments in the past, which have included: Amitriptyline for 15 years and stopped   Topiramate  Sumatriptan   lyrica-\"brain fog\"  depakote -allergies  Compazine +benedryl  Promethazine IV three times per week for   scopalamine patch     She currently takes amitriptyline, promethazine, zolmitriptine for headache prevention.     Ms. Mcghee's pain was assessed prior to the " procedure.  She rated her pain today as 4 out of 10.     Procedural Pause: Procedural pause was conducted to verify correct patient identity, procedure to be performed, correct side and site, correct patient position, and special requirements. Appropriate hand hygiene was utilized, and each injection site was prepped with alcohol wipe or Chloraprep swab.      Procedure Details: 200 units of onabotulinumtoxinA was diluted in 4 mL 0.9% normal saline. A total of 155 units of onabotulinumtoxinA were injected using 30 gauge 0.5 in needles into the muscles listed below. 45 units of onabotulinumtoxinA were wasted.         GOAL OF PROCEDURE:  The goal of this procedure is to decrease pain and enhance functional independence.     CONSENT:  The risks, benefits, and treatment options were discussed with the patient who agreed to proceed.     Written consent was obtained      EQUIPMENT USED:  Needles-30 gauge, 0.5 inches for injections  Four 1-ml tuberculin syringes for injections  One sodium chloride 10 ml vial preservative free  Alcohol swabs     SKIN PREPARATION:  Skin preparation was performed using an alcohol wipe.        AREA/MUSCLE INJECTED:  155 units of Botox  Right upper Trapezius (upper cervical) - 5 units of Botox at 3 site/s.   Left upper Trapezius (upper cervical) - 5 units of Botox at 3 site/s.      Right cervical paraspinals - 5 units of Botox at 2 site/s.   Left cervical paraspinals - 5 units of Botox at 2 site/s.      Left occipitalis - 5 units of Botox at 3 site/s.  Right occipitalis -5 units of Botox at 3 site/s     Right Frontalis - 5 units of Botox at 2 site/s.  Left Frontalis - 5 units of Botox at 2 site/s.     Right Temporalis - 5 units of Botox at 4 site/s.  Left Temporalis - 5 units of Botox at 4 site/s.     Right  - 5 units of Botox at 1 site/s.              Left  - 5 units of Botox at 1 site/s.     Procerus - 5 units of Botox at 1 site/s.     RESPONSE TO PROCEDURE:  tolerated the  procedure well and there were no immediate complications.  Patient was allowed to recover for an appropriate period of time and was discharged home in stable condition.     Left PICC -patient asked to take a look at her PICC site. Painful when bumps into something but otherwise no pain, no erythema, edema. 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 She will observed and goes to ED if any problems      FOLLOW UP:     Repeat Botox injections in 12 weeks        This procedure was performed under a hospital privileging agreement with Dr. Briseno              Again, thank you for allowing me to participate in the care of your patient.      Sincerely,    NATALY Hoffman CNP

## 2024-05-25 NOTE — LETTER
Transition Communication Hand-off for Care Transitions to Next Level of Care Provider    Name: Dinora Mcghee  : 1965  MRN #: [id number]  Primary Care Provider: Latasha Paul     Primary Clinic: [address] [place] [address] [address]  [facility] [address]     Reason for Hospitalization:  Adult failure to thrive [R62.7]  Admit Date/Time: 2021  6:29 AM  Discharge Date:   2021  Payor Source: Payor: BCBS / Plan: [place] / Product Type: Indemnity /   Discharge Plan:  Home with home care for home enteral feedings.    Discharge Needs Assessment:  Needs      Most Recent Value   Equipment Currently Used at Home  none   # of Referrals Placed by CTS  External Care Coordination, Homecare, Home Infusion        Follow-up plan:    Future Appointments   Date Time Provider Department Center   2021  8:00 AM Rachel Sloan, PhD [facility]   2021 11:30 AM Latasha Paul APRN [facility]   10/1/2021  8:00 AM Anita Becerra MD [facility]   11/3/2021  2:00 PM Vijaya Genao MD [facility]   2/3/2022  1:00 PM Gloria Melissa MD [facility]       Any outstanding tests or procedures:        Referrals     Future Labs/Procedures    Home infusion referral     Comments:    Home enteral formula , supplies and education          Liza Brownlee RN      
stated

## 2024-05-30 ENCOUNTER — VIRTUAL VISIT (OUTPATIENT)
Dept: ONCOLOGY | Facility: CLINIC | Age: 59
End: 2024-05-30
Attending: SOCIAL WORKER
Payer: COMMERCIAL

## 2024-05-30 ENCOUNTER — PATIENT OUTREACH (OUTPATIENT)
Dept: INTERNAL MEDICINE | Facility: CLINIC | Age: 59
End: 2024-05-30
Payer: COMMERCIAL

## 2024-05-30 VITALS — HEIGHT: 68 IN | BODY MASS INDEX: 18.79 KG/M2 | WEIGHT: 124 LBS

## 2024-05-30 DIAGNOSIS — F43.23 ADJUSTMENT DISORDER WITH MIXED ANXIETY AND DEPRESSED MOOD: Primary | ICD-10-CM

## 2024-05-30 DIAGNOSIS — Z51.5 PALLIATIVE CARE PATIENT: ICD-10-CM

## 2024-05-30 PROCEDURE — 90834 PSYTX W PT 45 MINUTES: CPT | Mod: 95 | Performed by: SOCIAL WORKER

## 2024-05-30 ASSESSMENT — PAIN SCALES - GENERAL: PAINLEVEL: NO PAIN (0)

## 2024-05-30 NOTE — PROGRESS NOTES
Clinic Care Coordination Contact  Follow Up Progress Note      Assessment:   RN called and spoke with patient. Pt reports that she is coming out of a tough allergies spin as a result of blooming early in the year. Patient states her mental health has improved- she usually does well from spring to late fall, so whether it is the weather, therapy, or antidepressants- things are improving and she is feeling well. She states that she is working on getting some of her medications changed to other forms and having success with this. Her and Usha, pharmacist MTM with gastro, had some questions about Movantik pills prescribed by Dr. Gomez, and if she knows if these come in any other forms that patient could consider. Pt states that they are not really doing anything anyways- difficulty with BM, even with enemas.     RN and patient talked in detail about her upcoming decision after discussion with Colorectal Surgery. Patient is having some concerns regarding this decision, and wants to make sure she speaks with all of the right providers. Therapeutic communication and listening provided to patient, as well as discussion of the right things to ask to make sure that the correct decision gets made. Patient also has therapy today to discuss further, and patient also has an Ostomy Nurse visit next week where she will be able to ask more technical questions. RN went through the questions that patient would like to ask, and further discussion about questions that patient could ask to understand more.     Patient did want to address some knee pain that she is having- she states that she doesn't know if she wants to deal with it for certain, but she states that it hurts throughout the day, L leg (interior knee pain towards where the front of the knees bend, sensitive to touch). She states that she has had it for years, and it is getting worse. She believes due to sitting more often. RN informed would address this with Dr. Gomez.      Patient states that she also is wondering about imaging that Dr. Gomez wanted her to have a follow up on this year in response to some abnormalities in her lungs last year. RN informed patient would review Dr. Gomez's notes from last year in the chart and make sure that the CT order that is in the chart is the accurate order for patient to call and schedule.     RN reviewed upcoming appointments with patient and also encouraged patient to call the clinic if anything is needed from us during this transition and difficult decision making time for patient. Patient states understanding and will call with concerns. RN to follow with patient in 1 month.     Care Gaps:    Health Maintenance Due   Topic Date Due    URINE DRUG SCREEN  Never done    MIGRAINE ACTION PLAN  Never done    COVID-19 Vaccine (7 - 2023-24 season) 01/24/2024    MAMMO SCREENING  06/02/2024    COLORECTAL CANCER SCREENING  06/09/2024       Currently there are no Care Gaps.    Care Plans  Care Plan: Health Maintenance       Problem: Health Maintenance Due or Overdue       Long-Range Goal: Become up-to-date with health maintenance visit(s)       Start Date: 12/5/2023    This Visit's Progress: On track Recent Progress: On track    Priority: High    Note:     Barriers: patient has complex illness and medical care, collaborates with various specialty teams  Strengths: patient has a great relationship with providers and members of care team, keeps healthcare management organized  Patient expressed understanding of goal: yes  Action steps to achieve this goal:  1. I will attend all of my upcoming appointments with my specialist and primary care provider.   2. I will communicate with my RN CC if there are further needs I have for assistance and support with primary care.   3. I will work with my RN CC on identifying all of the goals my providers have for helping me reach my health care needs.     RN goals:  RN to look at pt's care plans from previous visit  notes with providers. RN to help patient identify the items that have yet to be completed and stay on top of health care goals.   RN to collaborate with [facility] RN CC for transplant and GI.                                   Intervention/Education provided during outreach: Discussed patients plan of care. CC RN asked open ended questions, provided support, resources, and encouragement as needed. Patient will reach out to care team sooner than planned with new questions or concerns.        Plan:   See as above-     -check imaging recommendations for this Spring (per last visit note from PCP-  Pulmonary nodules  3mm nodule seen on recent imaging.  One year follow-up recommended since she is a former smoker.   - CT Chest w/o Contrast; Future) RN to inform patient that Chest CT with 6/14 expiration is correct image to have completed.   -message PCP regarding Knee concerns  -address Movantik routes with Dr. Gomez/ Usha pharmacy note   Care Coordinator will follow up in 1 month.     DARIELA ADAMS RN on 5/30/2024 at 9:32 AM

## 2024-05-30 NOTE — TELEPHONE ENCOUNTER
Her chart says the Movantik was ordered by her pain team, it is listed as historical on her med list.  Looks like only tablets are available from what I can find.     Juan Jose Gomez MD

## 2024-05-30 NOTE — LETTER
5/30/2024         RE: Dinora Mcghee  [address] [facility] [address]        Dear Colleague,    Thank you for referring your patient, Dinora Mcghee, to the [facility] [place]. Please see a copy of my visit note below.    Virtual Visit Details    Type of service:  Video Visit     Originating Location (pt. Location): Home    Distant Location (provider location):  On-site  Platform used for Video Visit: [employer]    Virtual Visit Details    Type of service:  Video Visit     Originating Location (pt. Location): Home    Distant Location (provider location):  On-site  Platform used for Video Visit: [facility]        Palliative Care Clinical Social Work Return Appointment    PLEASE NOTE:  THIS IS A MENTAL HEALTH NOTE.  OTHER PROVIDERS VIEWING THIS NOTE SHOULD USE THIS ONLY FOR UNDERSTANDING THE CONTEXT OF THE PATIENT'S EXPERIENCE.  TOPICS DESCRIBED IN THIS NOTE SHOULD NOT BE REFERENCED TO THE PATIENT BY MEDICAL PROVIDERS.    Dinora Mcghee is a 58  year old woman with multiple medical conditions including chronic pancreatitis s/p TPAIT, long term complications from gastroparesis, constipation, GERD, migraines.  Had GJ placed Sept 2021, recovery was complicated and she ended up with a venting g-tube.  Due to complications with PO tolerance she had surgical placement of a J tube, and a resection of densely adhered small bowel.  Started on tube feedings, then on TPN.  TPN eventually stopped due to infection.  Has heavy symptom burden that includes chronic nausea and vomiting, fatigue, complex medical management.  Has had multiple ER visits for GI and Bowel concerns.  Seen today for counseling for adjustment disorder with depressed mood and anxiety.          Mental Status Exam:(List all that apply)      Appearance: Appropriate      Eye Contact: Good       Orientation: Yes, x4      Mood: Normal       Affect: Appropriate       Thought Content: Clear         Thought Form: Logical      Psychomotor Behavior:  Normal    Visit themes:  grief, demoralization, depressed mood    Adjustment to Illness:  Saw colorectal surg, colectomy and iliestomy were recommended many feelings regarding this proposed treatment plan and many feelings regarding worries about quality of life including concerns about past allergic reactions to adhesives, worries that her recovery process will be long and drawn out, worries that she does not have enough reserves to recover from a major surgery and worry that the procedure will not allow her to feel much better than she feels now.  Symptoms continue to include fatigue, nausea, vomiting, demoralization and grief and depressed mood.       Mental Health (thoughts, feelings, actions, coping, and symptoms):     Not feeling well today     Grief   Demoralized            Helpful activities: time with family and friends, knitting        Helpful cognitions:     Unhelpful and/or triggering or exacerbating factors:     Body-Mind Skills Education: uses tapping and EMDR     Relationships:  and children, extended family    End of Life and Advance Care Planning:     Main therapeutic interventions provided this session include:     Provide psychoeducation and Facilitate processing of thoughts and feelings    Plan:  Will return for psychotherapy with palliative care focus in 2 weeks        Time spent with patient/family:    52 minutes  (Start 2:30 end 3:22)    JAIDA Nguyen, [facility]   Palliative Care Clinical   Ph: [phone]      DO NOT SEND ANY LETTERS                Again, thank you for allowing me to participate in the care of your patient.        Sincerely,        Sweta Ghosh [facility]

## 2024-05-30 NOTE — PROGRESS NOTES
BACKGROUND:  FHx asthma  Bronchiolitis at 4 weeks - recovered completely - asymptomatic  Abnormal lateral neck xray  IMPRESSION:  Clinically well  PLAN:  Lateral neck xray today - normal  Additional:  Reassure  FUTURE:  Follow Up Dr Ashley Whitaker as needed Virtual Visit Details    Type of service:  Video Visit     Originating Location (pt. Location): Home    Distant Location (provider location):  On-site  Platform used for Video Visit: [employer]    Virtual Visit Details    Type of service:  Video Visit     Originating Location (pt. Location): Home    Distant Location (provider location):  On-site  Platform used for Video Visit: Well        Palliative Care Clinical Social Work Return Appointment    PLEASE NOTE:  THIS IS A MENTAL HEALTH NOTE.  OTHER PROVIDERS VIEWING THIS NOTE SHOULD USE THIS ONLY FOR UNDERSTANDING THE CONTEXT OF THE PATIENT'S EXPERIENCE.  TOPICS DESCRIBED IN THIS NOTE SHOULD NOT BE REFERENCED TO THE PATIENT BY MEDICAL PROVIDERS.    Dinora Mcghee is a 58  year old woman with multiple medical conditions including chronic pancreatitis s/p TPAIT, long term complications from gastroparesis, constipation, GERD, migraines.  Had GJ placed Sept 2021, recovery was complicated and she ended up with a venting g-tube.  Due to complications with PO tolerance she had surgical placement of a J tube, and a resection of densely adhered small bowel.  Started on tube feedings, then on TPN.  TPN eventually stopped due to infection.  Has heavy symptom burden that includes chronic nausea and vomiting, fatigue, complex medical management.  Has had multiple ER visits for GI and Bowel concerns.  Seen today for counseling for adjustment disorder with depressed mood and anxiety.          Mental Status Exam:(List all that apply)      Appearance: Appropriate      Eye Contact: Good       Orientation: Yes, x4      Mood: Normal       Affect: Appropriate       Thought Content: Clear         Thought Form: Logical      Psychomotor Behavior: Normal    Visit themes:  grief, demoralization, depressed mood    Adjustment to Illness:  Saw colorectal surg, colectomy and iliestomy were recommended many feelings regarding this proposed treatment plan and many feelings regarding worries about  quality of life including concerns about past allergic reactions to adhesives, worries that her recovery process will be long and drawn out, worries that she does not have enough reserves to recover from a major surgery and worry that the procedure will not allow her to feel much better than she feels now.  Symptoms continue to include fatigue, nausea, vomiting, demoralization and grief and depressed mood.       Mental Health (thoughts, feelings, actions, coping, and symptoms):     Not feeling well today     Grief   Demoralized            Helpful activities: time with family and friends, knitting        Helpful cognitions:     Unhelpful and/or triggering or exacerbating factors:     Body-Mind Skills Education: uses tapping and EMDR     Relationships:  and children, extended family    End of Life and Advance Care Planning:     Main therapeutic interventions provided this session include:     Provide psychoeducation and Facilitate processing of thoughts and feelings    Plan:  Will return for psychotherapy with palliative care focus in 2 weeks        Time spent with patient/family:    52 minutes  (Start 2:30 end 3:22)    JAIDA Nguyen, [facility]   Palliative Care Clinical   Ph: [phone]      DO NOT SEND ANY LETTERS

## 2024-05-30 NOTE — LETTER
5/30/2024         RE: Dinora Mcghee  [address] [facility] [address]        Dear Colleague,    Thank you for referring your patient, Dinora Mcghee, to the [facility] [place]. Please see a copy of my visit note below.    Virtual Visit Details    Type of service:  Video Visit     Originating Location (pt. Location): Home    Distant Location (provider location):  On-site  Platform used for Video Visit: [employer]    Virtual Visit Details    Type of service:  Video Visit     Originating Location (pt. Location): Home    Distant Location (provider location):  On-site  Platform used for Video Visit: [facility]        Palliative Care Clinical Social Work Return Appointment    PLEASE NOTE:  THIS IS A MENTAL HEALTH NOTE.  OTHER PROVIDERS VIEWING THIS NOTE SHOULD USE THIS ONLY FOR UNDERSTANDING THE CONTEXT OF THE PATIENT'S EXPERIENCE.  TOPICS DESCRIBED IN THIS NOTE SHOULD NOT BE REFERENCED TO THE PATIENT BY MEDICAL PROVIDERS.    Dinora Mcghee is a 58  year old woman with multiple medical conditions including chronic pancreatitis s/p TPAIT, long term complications from gastroparesis, constipation, GERD, migraines.  Had GJ placed Sept 2021, recovery was complicated and she ended up with a venting g-tube.  Due to complications with PO tolerance she had surgical placement of a J tube, and a resection of densely adhered small bowel.  Started on tube feedings, then on TPN.  TPN eventually stopped due to infection.  Has heavy symptom burden that includes chronic nausea and vomiting, fatigue, complex medical management.  Has had multiple ER visits for GI and Bowel concerns.  Seen today for counseling for adjustment disorder with depressed mood and anxiety.          Mental Status Exam:(List all that apply)      Appearance: Appropriate      Eye Contact: Good       Orientation: Yes, x4      Mood: Normal       Affect: Appropriate       Thought Content: Clear         Thought Form: Logical      Psychomotor Behavior:  Normal    Visit themes:  grief, demoralization, depressed mood    Adjustment to Illness:  Saw colorectal surg, colectomy and iliestomy were recommended many feelings regarding this proposed treatment plan and many feelings regarding worries about quality of life including concerns about past allergic reactions to adhesives, worries that her recovery process will be long and drawn out, worries that she does not have enough reserves to recover from a major surgery and worry that the procedure will not allow her to feel much better than she feels now.  Symptoms continue to include fatigue, nausea, vomiting, demoralization and grief and depressed mood.       Mental Health (thoughts, feelings, actions, coping, and symptoms):     Not feeling well today     Grief   Demoralized            Helpful activities: time with family and friends, knitting        Helpful cognitions:     Unhelpful and/or triggering or exacerbating factors:     Body-Mind Skills Education: uses tapping and EMDR     Relationships:  and children, extended family    End of Life and Advance Care Planning:     Main therapeutic interventions provided this session include:     Provide psychoeducation and Facilitate processing of thoughts and feelings    Plan:  Will return for psychotherapy with palliative care focus in 2 weeks        Time spent with patient/family:    52 minutes  (Start 2:30 end 3:22)    JAIDA Nguyen, [facility]   Palliative Care Clinical   Ph: [phone]      DO NOT SEND ANY LETTERS                Again, thank you for allowing me to participate in the care of your patient.        Sincerely,        Sweta Ghosh [facility]

## 2024-05-31 ENCOUNTER — TELEPHONE (OUTPATIENT)
Dept: GASTROENTEROLOGY | Facility: CLINIC | Age: 59
End: 2024-05-31

## 2024-05-31 NOTE — TELEPHONE ENCOUNTER
Prior Authorization Retail Medication Request    Medication/Dose: prucalopride succinate (MOTEGRITY) 2 MG tablet

## 2024-06-04 ENCOUNTER — VIRTUAL VISIT (OUTPATIENT)
Dept: WOUND CARE | Facility: CLINIC | Age: 59
End: 2024-06-04
Attending: COLON & RECTAL SURGERY
Payer: COMMERCIAL

## 2024-06-04 DIAGNOSIS — K59.00 CONSTIPATION: Primary | ICD-10-CM

## 2024-06-04 PROCEDURE — 99211 OFF/OP EST MAY X REQ PHY/QHP: CPT | Mod: 95

## 2024-06-04 NOTE — PROGRESS NOTES
[facility] Preoperative Ostomy Consult Via video consult    Referral from Dr. Branch   Consult attended by patient   Diagnosis: Constipation Date of Surgery: Not scheduled   Type of Surgery: Possibly a total abdominal colectomy with an ileorectal anastomosis with diverting loop ileostomy, but she may still struggle with this due to pelvic floor dysfunction and nocturnal leakage after enemas. VS a total abdominal colectomy with end ileostomy (and leaving a rectal stump)   Emotional readiness for surgery: no acute distress  Physical Limitations: Without limitations  Abdomen assessed for site by: not assessed  Teaching: Anatomy/picture of stoma, stoma/bowel function postop, intro to pouches, diet, precautions with dehydration/blockage, and role of WOC/postop followup explained.  Patient requested to meet regarding the possibility of an upcoming surgery.  She had several questions.  She currently has nausea and vomiting possibly related to her constipation and she is wondering if those symptoms will go away.  Remarked that if the nausea and vomiting is related to constipation it would however since she is also status post total pancreatectomy and gastroparesis there might be another causes for the symptoms.   She is had 2 small bowel obstructions and she will continue to have the risk of a small bowel obstruction if she has an ileostomy.    She also has a risk of dehydration since we are bypassing the colon.  She remarked that she is getting fluids in addition to her tube feedings.   She remarked that she has a lot of bloating after eating and after tube feedings.  When she eats she typically makes smoothies and milkshakes.  I asked her to consider to go dairy free and discussed this with her her primary care doctor and get the tube feedings adjusted to that.   She is worried about tape allergies and I explained that barriers are made with hydrocolloid which is very skin friendly.  However she can come to clinic and pick  up some products so she can try them on her skin.    We discussed the 2 options of an end ileostomy which would be likely permanent and a loop ileostomy which would be likely temporary if she would be reconnected with an ileorectal anastomosis.All questions were answered.    Stoma site marking:  not marked     Raeann Bowen NP was available for supervision of care if needed or if questions should arise and regarding plan of care.  Aleyda Ceja RN [facility]

## 2024-06-04 NOTE — TELEPHONE ENCOUNTER
Pts tube fell out over the weekend, got replaced at local ER, has dangling tube for now.   Pt wondering why balloons keep popping internally, wondering if internal staples are popping it. Pt was cleaning out her refrigerator when it fell out last time. Pt still has pain in upper GI area, has had since GJ, more nauseated since tube placement, more vomiting than before     Pt meets with surgery on 12/21- called [facility] to have them provider backup gtube.     ML   Home

## 2024-06-05 ENCOUNTER — OFFICE VISIT (OUTPATIENT)
Dept: GASTROENTEROLOGY | Facility: CLINIC | Age: 59
End: 2024-06-05
Payer: COMMERCIAL

## 2024-06-05 VITALS
BODY MASS INDEX: 19.1 KG/M2 | SYSTOLIC BLOOD PRESSURE: 105 MMHG | DIASTOLIC BLOOD PRESSURE: 69 MMHG | HEART RATE: 94 BPM | OXYGEN SATURATION: 98 % | WEIGHT: 126 LBS | HEIGHT: 68 IN

## 2024-06-05 DIAGNOSIS — Z94.83 STATUS POST PANCREAS TRANSPLANTATION (H): ICD-10-CM

## 2024-06-05 DIAGNOSIS — K31.84 GASTROPARESIS: Primary | ICD-10-CM

## 2024-06-05 DIAGNOSIS — Z93.4 SMALL BOWEL TUBE FEEDING (H): ICD-10-CM

## 2024-06-05 DIAGNOSIS — K59.00 CONSTIPATION, UNSPECIFIED CONSTIPATION TYPE: ICD-10-CM

## 2024-06-05 PROCEDURE — 99417 PROLNG OP E/M EACH 15 MIN: CPT | Performed by: INTERNAL MEDICINE

## 2024-06-05 PROCEDURE — 99215 OFFICE O/P EST HI 40 MIN: CPT | Performed by: INTERNAL MEDICINE

## 2024-06-05 ASSESSMENT — PAIN SCALES - GENERAL: PAINLEVEL: MODERATE PAIN (4)

## 2024-06-05 NOTE — PROGRESS NOTES
GI CLINIC VISIT    CC:  follow-up      ASSESSMENT/PLAN:    (K31.84) Gastroparesis  (K59.00) Constipation, unspecified constipation type  (Z93.4) Small bowel tube feeding (H)  (Z94.83) Status post pancreas transplantation (H)      As per my prior documentation, severe gastroparesis, with marked nausea/vomiting, pain, inability to tolerate po intake. Unresponsive to medications (promotility agents, domperidone, antiemetic regimen, etc). Use of Motegrity did not seem to improve much in the way of upper GI symptoms; there's been some concern that it may have contributed to HAs in past, though patient continued as for a time it seemed to help her move her bowels. Continues with G-tube for venting (currently near continuous). With surgical J-tube for TFs which she is generally tolerating - has more recently been up to 120 mL/hr and running for 10-12 hrs a day with maintenance of weight. Having bites of food on most days - some good days can tolerate small bowl of soft food (ice cream, cream of wheat) other days vomits whatever she ate many hours later.     Continues with antiemetics, has some IV through [facility]. Future consideration could be hydroxyzine.     For constipation - we discussed that she did not have to jump into colectomy but that it was important to be aware of what may or may not be an option for her. She seems to have failed/approaching failure of available medication options for constipation/motility. She will continue to think on the matter but hopes there is something else to try given her many prior surgeries.  We discussed that we could consider adding another med on top of her current regimen (adding back in Linzess or Amitiza) or swapping in Trulance (previously stopped due to side effects, but one of those side effects was too loose of stools). As noted in the HPI, she has concern that only giving of enema produces any result and that cost of meds is becoming more of a struggle. After discussion, we  ultimately decided to try a trial of slowly scaling back on some of these ineffective meds as as outlined below. She will keep us updated on progress as she goes.       We had previously discussed a second opinion [facility] and she is willing to travel. She states that she's open to many treatment options and wonders if their are trials should could participated in. We will work to facilitate this.      She highlights that two of her children are getting  in the next 12 months and she wants to be well enough to be there and be an active participant.       - ok to increase amitriptyline to 50 mg at bedtime, if pain improved, stopped there, if no change in pain after two weeks, ok to increase to 60 mg at bedtime  - continue pink lady enemas as you are  - continue the Movantik and sennosides  - stop Motegrity and monitor response  - if motility meds are truly no longer helping, we can work to scale back on these and continue enemas  - continue home pelvic floor PT exercises, we can re-refer you down the road if problems escalate  - ok to give IV pantoprazole more slowly if having nausea  - contact GI clinic if having marked pain or no response to home bowel regimen, we can consider sooner GGE enema  - keep up your great work on tube feeds --> agree with a trial of lactose-free tube feeds to see if bloating improved  - ok for oral intake as tolerated, please continue protein with coffee if able  - follow-up in 3 months, GI will arrange        It was a pleasure to participate in the care of this patient; please contact us with any further questions.  A total of 64 face-to-face minutes was spent with this patient, >50% of which was counseling regarding the above delineated issues. An additional (unbilled) time was spent on the days before and after the encounter doing chart review, documentation, care coordination, and further activities as noted above.    Vijaya Genao MD   of  Medicine  Division of Gastroenterology, Hepatology and Nutrition  [facility]    ---------------------------------------------------------------------------------------------------  HPI:      Ms. 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 She was initially seen in general GI clinic by this writer on 11/1/2017 and has been/ seen by myself and ANA Gan since that time. Her pancreas txplt surgeon was Dr. Phoenix. She also follows with Dr. Park of GI in [facility]. Her last visit with me was last week (3/13/2024).           History summarized and updated from previous GI documentation. Please see previous notes for further details      Gastroparesis, nausea and vomiting  Venting G-tube  Tube feeds via J-tube  Initially diagnosed years ago with 2-hour study gastric emptying study at [facility]. Repeat testing here with 4-hour GES on 6/27/2016 with 49% remaining at 4 hours (?on pain meds at the time).  This was addressed with prior GJ tube in fall 2016, NG tube used for venting. Ultimately, after TPIAT, she was able to wean off of TFs and return to a regular diet.  Patient did have some persisting nausea and vomiting which she treated over the years with various medications including scopolamine patch, prochlorperazine, and promethazine. For GP in the past she had no response to metoclopramide. She has not been able to try erythromycin or azithromycin due to anaphylactic event with prior azithromycin use (in ED in 2008) She has also been seen by neurology and psychology to assist with insomnia and with migraines (and any  contribution they may have to nausea).       Unfortunately, things acutely worsened in November 2020.  An ED evaluation at that time was unremarkable and CT only revealed moderate stool burden, nonspecific fluid in small intestine.  Theses episodes continued to recur.       Bowel regimen was adjusted to ensure adequate output. Dietary changes were made (freq, small meals to liquid-only) and support with antiemetics. Initially these dietary changes seemed to help, but over time the response waned. Another course of rifaximin (previously successful at addressing bloating and stool issues) was not helpful for her pain, bloating/fullness, and n/v. Amitriptyline was increased to 60 mg nightly with no change in discomfort.    From winter 2020 to May/Sbaa she lost about 16 lbs (from 156-->140 lbs). She continued to experience more pronounced post-prandial pain and fullness and had vomiting of food after eating. Work-up for alternate causes (with SBFT, CTE, CTA) was normal.       A GJ was placed in Sept 2021, but this was replaced a few times due to J-tube coiling in stomach as well as G-tube clogging/malfunction. On 10/19/21 she had placement of a weighted GJ but continued to struggle with discomfort at insertion site, clogging/venting issues. Ultimately a direct jejunostomy was done and after struggling with this for awhile it was removed. She worked hard to return to an oral diet.      By summer 2022, Ms. Loo had only a G-tube for venting.  At that time she was tolerating one small plate/1 cup/1 bowl of food without vomiting, 70% of the time. She would have 5-6 of these servings a day (~3326-7506 Kcal). Ms. Loo also had regular protein supplement drinks and hydration drinks. Her weight stabilized around 140 lbs. For nausea, she continued on her chronic scopolamine patch as well as prochlorperazine and promethazine about every 6-8 hours.       Later in 2022 she had progressive decline in her ability to tolerate  oral intake and continued to lose weight. She was initiated on domperidone by Dr. Mandeep Park, but did not have response even with up-titration of dosing. Small bowel feeds were discussed and her G (KISHORE-KEY) was replaced with a G-J a couple times, but had issues with J-tube extension coiling. Ultimately on 12/30/22, Dr. Bauer placed a direct J in the OR; extensive BAUDILIO and a resection of 15 cm of SB with dense adhesions was also done. TFs were started and continued in earnest from that point on.      In Jan 2023, unfortunately a perforation occurred during G and J tube exchange. She went to the OR that day. Jejunal perforation was repaired with bowel resection and additional BAUDILIO was performed. She was hospitalized thereafter and TFs were restarted.      Through early 2023, she felt better on just jejunal feeds with reduction in emesis, though still experienced nausea  for which she took: 1 scopolamine patch daily, IV promethazine once daily with J-tube promethazine at night, and compazine 10 mg - once daily. Her TFs were running at 45 mL for 18 hrs a day with oral intake is just bites of food for pleasure. She continued to vent her G-tube frequently - mostly seeing clear, colored liquid - no TFs. She continued on Relizorb digestive enzyme cartridge and did an IVF bolus via port each day.     In fall 2023,  Ms. Loo reported an ED presentation due to symptoms concerning for SBO, though imaging was unremarkable. She stated she had two hours of diarrhea after leaving the ED and felt markedly better. At her last visit, she was vomiting about 5 times a week. This is better with her doing 24/7 venting. She has very little in her emesis, unless it occurs shortly after her pills are given via the G-tube. In addition to her chronic antiemetics, she started buprenorphine patch and medical MJ which were helpful. She continued on J-tube feeds (for all nutritional needs, was not tolerating things by mouth).  She did feel  she had leaking around the J-tube, but this has since decreased. She reiterates that she very much does not want to re-do the tube unless absolutely necessary.     By early 2024, nausea and emesis have continued to impact any oral intake. Jejunal feeds continue (see constipation below) and symptoms worsened when her bowels were backed up (see constipation below).      In March 2024, emesis was a bit better though persisting. She had nausea each day, though vomiting was only about 3 days a week.    For TFs she was getting in 4 cans/day, 1500 kcal a day, running at 90 mL/hr for 11 hours, eating a small amount. She is worried she is losing weight again. At that visit we discussed altering TF goals with [facility] team.     Today, Ms. Loo has further increased TFs and is running at about 120 mL/hr for 10-12 hours a day. She can tolerate some bites of food and on a good day a small bowl of ice cream or Cream of Wheat. There are days when oral intake does not go as well and she recalls an episode where she vomited yogurt and blueberries that she'd had 7 hours earlier. Weight is stable, but not gaining as she'd like.        Constipation/irregular bowel movements/bloating  Patient reports history of ongoing constipation for over 20 years after having her first child.  She has tried multiple interventions including regular bowel cleanouts, MiraLAX, senna, milk of magnesium, Linzess, and Amitiza for which she was on when she [facility].  Note, patient has been on Creon in the past (currently on Relizorb digestive enzyme cartridge).    Amitiza had worked for awhile then lost effectiveness. She was trialed on Trulance but reported frequent loose stools in the initial days (including nocturnal stooling and incontinence) which prompted her to stop. Trial of rifaximin in the past with with improvement in bloating and stool consistency though, as stated above, a re-trial for her pain, n/v, and fullness was  "not successful.    Motegrity was then started with some success, though noted HAs. We discussed a possible switch in her regimen to address this, though she was most comfortable with continuing Motegrity and monitoring her symptoms.  Previously discussed combining daily constipation medications; noted patient reports cost for Amitiza was quite a lot  (running $1000+/month).     By 2022, she was on Motegrity daily as well as Miralax 1 capful BID, 4 senna a day, 1000 mg Mag citrate daily, and stool softeners. With this she would have no BMs for 3-4 days (though has gone anywhere from 1-8 days), then have a day of emptying where she has multiple BMs over a day. These were often \"enormous\" and she felt empty when complete. Noted her bowel pattern is complicated by her need for antiemetics. When she is \"backed up\" she will experience early satiety.       By late 2022, she felt BMs were improved with restart of TFs. She stopped Motegrity/prucalopride around 12/30/2022. She was taking only 2.5 mL of liquid senna BID (total 8.8 mg daily).  With this Ms. Loo, was moving her bowels 3-4 days out of a week. On the days she moved her bowels it was typically more than once (2-3 times). Stool consistency was soft, \"ribbon-like\" stools. She denied straining. When asked about HAs she did feel these were better - unclear if this is because she went off Motegrity or if due to treatment of migraines  (received Botox injections.)     In fall 2023, she's restarted Motegrity, Senna twice daily (5 mL) with Miralax prn. She continued on amitriptyline (discomfort). With this she had a BM 5 out of 7 days , nearly always in the morning. She may have a few more  BMs later in the day, though she's done by early afternoon. Stools were often formed, smooth, and \"sticky\", occasionally loose.  She continued on Relizorb.     In early 2024,  Ms. Loo had increasing nausea, abdominal pain, and decreased stool output. Please see Virtual[employer]ot messages " and telephone encounters for further details. Acute CT imaging did not reveal an SBO or other pathology. Home bowel regimen was not particularly successful. She ended up getting a soapsuds enema at her local ED with results and some improved symptoms. We pursued a therapeutic gastrograffin enema and aggressive bowel regimen to ensure colonic emptying to address worsening pain, discomfort, and nausea.  We then adjusted her bowel regimen with plan for periodic GGE and consideration of adding Movantik (to be discussed with pain team as well).       In March 2024, she continued with pelvic floor PT and was feeling a little more in sync with biofeedback. She continued home PT exercises and noted some improvement with bladder control too.   Bowel pattern was: BMs on 5/7 days, about 4 of the 7 days had good amount of stool output. She has been doing enemas about every 2-3 days (just had two days without enemas, then did one today with very large output.). Stools are all loose.     Today, Ms. Loo reports continuing Motegrity and as started Movantik 60 mg through pain clinic. She continues on sennosides 8.8 mg BID and is doing pink lady enemas about 3-4 times a week choosing to administer one when she feel uncomfortable.She is able to hold the enema in for awhile, certainly 15-30 min. Afterwards she will pass a palm-size stool - ice cream consistency with a lot of pink water. She passes a lot of flatus as well.  She has some relief of discomfort after evacuating and passing this gas, though she doesn't feel cramping and bloating improve totally.       Since the addition of the Movantik she note sure anything has changed. She feels that only the enema makes a difference and she hasn't really felt that the other medication are doing anything anymore. As she's shared in the past - cost is a significant issue and she is paying a lot out-of-pocket for her complex med regimen.       She was able to see Dr. Branch of  colorectal surgery for discussion of potential surgical options. Total abdominal colectomy with likely ileostomy (known pelvic floor dysfunction) was reviewed though possible ileorectal anastomosis diverting loop ileostomy was discussed.  She did feel it was overwhelming to have to think about surgery given what's she's previously gone through and the various options. She recently saw our ostomy nurse for an informational session.         GERD, Dysphagia   Summarized/taken from prior documentation  Patient experiences GERD as substernal and throat burning with nocturnal relaxant causing choking. She reports a prior Schatzki's ring requiring previous dilation. Manometry in 2015 was normal, and pH impedence at that time had a DeMeester of 24 with positive symptoms association. Patient has treated GERD symptoms with some combination of BID PPI, Carafate, H2 blocker, and Tums in the past. She'd had issues with access to liquid PPI due to insurance and liquid famotidine had not be sufficient for symptoms. Omeprazole by mouth seemed to never pass the stomach/get absorbed and would come out the G-tube when vented even when clamped for an hour or so (clamping longer led to more severe pain and symptoms). IV pantoprazole was ultimately started (issues digesting the pill).      Today, not discussed.         Pain:   In regards to pain she had followed initially in our pain clinic for management of bulging disk as well as chronic abdominal pain for which she previously had local injections. Ms. Loo shares that much of her abdominal pain is a lot better after BAUDILIO and her first SB resection.  She was off of all pain medications except Tylenol and methycarbomal (for back pain) until her surgery and hospitalization for management of her perforation. She's continued to have pain at both her G and J-tube site.      She follows with [facility] at this time. She is enrolled in spinal cord stimulator study and hoping thi helps  "with pain. She was told this may have some impact on her nausea as well.      In March  she had a has a few sites that are bothering her:  G-tube site: sharp pain at site  Pelvis: intermittent pelvic pain described like \"labor pain\"  PICC line: hurts with dressing/tape change as well as injections  Chronic abd pain: continues on pain meds, baclofen which brings this pain down to a 3/10      -We'd started amitriptyline and tried to address some of the chronic discomfort. Today, she continues on amitriptyline 40 mg at bedtime, though is not sure it's helped.        PROBLEM LIST  Patient Active Problem List    Diagnosis Date Noted    Bacteremia 11/10/2023     Priority: Medium    Abdominal pain, unspecified abdominal location 11/09/2023     Priority: Medium    Cholangitis (H28) 06/07/2023     Priority: Medium    On total parenteral nutrition (TPN) 06/04/2023     Priority: Medium    Fever, unspecified fever cause 06/04/2023     Priority: Medium    Chronic pancreatitis, unspecified pancreatitis type (H) 06/04/2023     Priority: Medium    History of food intolerance 05/09/2023     Priority: Medium    Malnutrition, unspecified type (H24) 05/09/2023     Priority: Medium    Nausea and vomiting, unspecified vomiting type 05/09/2023     Priority: Medium    Major depression, single episode 03/06/2023     Priority: Medium    Acute postoperative abdominal pain 01/31/2023     Priority: Medium    Feeding intolerance 12/30/2022     Priority: Medium    Myofascial pain 10/14/2022     Priority: Medium     Added automatically from request for surgery [id number]      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  pancreas and spleen removed, islet cells transplanted into liver    3 classes of vaccines needed .    1. Pneumococcal vaccines are as follows:       PCV 13 - one time dose (was given 2017)       PPSV23 - (given 12/1/16); repeat q 5 years      ROLE of PCV " 20???    2. Meningococcal vaccines     Menactra - (last given 12/1/16) -  repeat q 5 yrs   NOTE: need to wait 4 wks after PCV 13 has been given.   OR - give Menveo instead as it can be given same time as PCV 13    AND   Bexcero - (got 12/9/16) - does not need to be repeated.     3. The Hib vaccine - (got 12/9/16) - does not need to be repeated.      Poisoning by drug 05/09/2022     Priority: Medium     Formatting of this note might be different from the original.  Per Care Everywhere review:  All oral, IV and injectable steroids are contraindicated (unless in life threatening situations) in [facility] transplant recipients. They can cause irreversible loss of islet cell function. Please contact patient's transplant care coordinator ADI Gaffney RN at [phone]/pager [phone] and/or endocrinologist prior to administration.      Type 1 diabetes mellitus without complication (H) 05/09/2022     Priority: Medium     Formatting of this note might be different from the original.  ACTUALLY - DM 3c - pathogenic diabetes as no pancreas.  (pancreas and spleen removed, islet cells transplanted into liver)    Seeing ENDO at [facility] - Dr Erum Melissa      Intra-abdominal abscess (H) 12/03/2021     Priority: Medium    Postprocedural intraabdominal abscess 12/03/2021     Priority: Medium    Gastrostomy tube obstruction (H) 11/27/2021     Priority: Medium    Abdominal pain, generalized 09/18/2021     Priority: Medium    Adult failure to thrive 08/16/2021     Priority: Medium     Added automatically from request for surgery [id number]      Hypoglycemia 08/05/2021     Priority: Medium    Iron deficiency anemia 03/22/2019     Priority: Medium    Headache, chronic migraine without aura 09/18/2018     Priority: Medium    Chronic pain syndrome 09/18/2018     Priority: Medium    S/P hernia repair 08/23/2018     Priority: Medium    Incisional hernia, without obstruction or gangrene 05/20/2018     Priority: Medium    Adhesive capsulitis  of shoulder, unspecified laterality 11/14/2017     Priority: Medium    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  Excess IgG4  [facility]      Gastroparesis      Priority: Medium    ACP (advance care planning) 03/28/2017     Priority: Medium     Advance Care Planning 3/28/2017: Receipt of ACP document:  Received: Health Care Directive which was witnessed or notarized on 12/8/16.  Document previously scanned on 1/12/17.  Validation form completed and scanned.  Code Status reflects choices in most recent ACP document..  Confirmed/documented designated decision maker(s).  Added by May Baires   Advance Care Planning Liaison              Pancreatic insufficiency 01/17/2017     Priority: Medium    Diabetes insipidus (H24) 01/05/2017     Priority: Medium     Formatting of this note might be different from the original.  Diabetes Insipidus (DI)  Per external records.  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  Depression NOS      Other type of intractable migraine      Priority: Medium     Diagnosis updated by automated process. Provider to review and confirm.      GERD (gastroesophageal reflux disease) 12/01/2010     Priority: Medium    Lumbago 04/18/2005     Priority: Medium     History of L5-S1 degenerative disk disease.        Sensorineural hearing loss 01/10/2005     Priority: Medium     Problem list name updated by automated process. Provider  to review      Vertiginous syndrome and labyrinthine disorder 01/10/2005     Priority: Medium     Problem list name updated by automated process. Provider to review  IMO Regulatory Load OCT 2020      Sprain and strain of other specified sites of hip and thigh 12/09/2002     Priority: Medium    Chondromalacia of patella 12/09/2002     Priority: Medium    Need for prophylactic immunotherapy      Priority: Medium     trees, grass, srw, dust mites, cat      Allergic rhinitis due to other allergen 12/21/2001     Priority: Medium    Hypothyroidism      Priority: Medium     Problem list name updated by automated process. Provider to review         PERTINENT MEDICATIONS:  Current Outpatient Medications   Medication Sig Dispense Refill    acetaminophen (TYLENOL) 325 MG/10.15ML solution 20.3 mLs (650 mg) by Per J Tube route every 6 hours as needed for mild pain or fever 473 mL 4    baclofen (LIORESAL) 10 MG tablet 1-2 tablets (10-20 mg) by Per G Tube route 3 times daily 60 tablet 1    buprenorphine (SUBUTEX) 2 MG SUBL sublingual tablet Place 2 mg under the tongue 2 times daily      cetirizine (ZYRTEC) 10 MG tablet 1 tablet (10 mg) by Per G Tube route 2 times daily 60 tablet 11    COMPOUNDED NON-CONTROLLED SUBSTANCE (CMPD RX) - PHARMACY TO MIX COMPOUNDED MEDICATION Administer 40 mg amitriptyline suspension (2 mg/mL) via G-J tube at bedtime. 600 mL 5    COMPOUNDED NON-CONTROLLED SUBSTANCE (CMPD RX) - PHARMACY TO MIX COMPOUNDED MEDICATION Place 1 enema rectally 2 times daily as needed (for constipation) 60 each 3    COMPRO 25 MG suppository INSERT ONE SUPPOSITORY RECTALLY EVERY 12 HOURS AS NEEDED FOR NAUSEA 10 suppository 1    Continuous Blood Gluc Sensor (FREESTYLE LISA 3 SENSOR) MISC CHANGE EVERY 14 DAYS 2 each 11    Digestive Enzyme Cartridge (RELIZORB) MARCO 2 Cartridges daily 60 each 6    diphenhydrAMINE (BENADRYL) 12.5 MG/5ML solution Take 25 mg by mouth 4 times daily as needed for other (nausea)      droNABinol (SYNDROS)  "5 MG/ML oral soln Take 0.5 mLs (2.5 mg) by mouth 2 times daily 60 mL 0    DULoxetine HCl 60 MG CSDR 1 capsule (60 mg) by Per J Tube route daily Sprinkle caps for feeding tube 30 capsule 1    estradiol (VAGIFEM) 10 MCG TABS vaginal tablet INSERT 1 TABLET(10 MCG) VAGINALLY 2 TIMES A WEEK 24 tablet 2    famotidine (PEPCID) 40 MG/5ML suspension 2.5 mLs (20 mg) by Per J Tube route daily 50 mL 4    glucagon 1 MG kit Give 0.1 to 0.15mg( 10-15 units on Insulin sryinge) subcutaneous  every 15 minutes PRN for hypoglycemia. Remix new kit q24hr. Needs up to 3 kit/week. 10 each 3    glucose 40 % GEL gel Take 15-30 g by mouth every 15 minutes as needed for low blood sugar 3 Tube 2    insulin aspart (NOVOLOG PEN) 100 UNIT/ML pen Take 1 unit if BG is 175-275, and 2 units if >275 up to every 4 hours as needed.  \"Prime\" pen needle with 2 unit airshot before giving, needs supply for 15 unit/day. 15 mL 11    insulin detemir (LEVEMIR PEN) 100 UNIT/ML pen Take 7 to 10 units per day, adjust as directed by MD.  Do 2 unit 'prime' before dosing, needs 12 units/day supply. 15 mL 5    insulin syringe-needle U-100 (30G X 1/2\" 0.3 ML) 30G X 1/2\" 0.3 ML miscellaneous Use 3 syringes daily or as directed. 100 each 11    lactated ringers infusion Inject 1,000 mLs into the vein daily as needed      levothyroxine (SYNTHROID) 25 mcg/mL SUSP 5.48 mLs (137 mcg) by Per J Tube route daily 500 mL 3    lidocaine (LIDODERM) 5 % patch Place onto the skin every 24 hours To prevent lidocaine toxicity, patient should be patch free for 12 hrs daily.      lipase-protease-amylase (CREON) 47224-95195-588239 units CPEP per EC capsule Take 3-4 capsules by mouth 3 times daily (with meals) With oral meals 150 capsule 11    methocarbamol (ROBAXIN) 750 MG tablet Take 1 tablet (750 mg) by mouth 4 times daily as needed for muscle spasms 120 tablet 11    MOTEGRITY 2 MG tablet TAKE ONE TABLET BY MOUTH ONCE DAILY 30 tablet 6    naloxegol (MOVANTIK) 12.5 MG TABS tablet Take 25 " "mg by mouth every morning (before breakfast)      Nutritional Supplements (BOOST HIGH PROTEIN) LIQD After above baseline labs are drawn, give: 6 mL/kg to maximum of 360 mL; the beverage is to be consumed within 5 minutes.  0    pantoprazole (PROTONIX) 40 mg IV push injection Inject 40 mg into the vein daily (with breakfast) 30 each 11    prochlorperazine (COMPAZINE) 10 MG tablet Take 1 tablet (10 mg) by mouth every 6 hours as needed for nausea or vomiting 120 tablet 3    promethazine 25 mg Inject 25 mg into the vein 3 times daily      rimegepant (NURTEC) 75 MG ODT tablet Place 1 tablet (75 mg) under the tongue daily as needed for migraine Maximum of 1 tablet every 24 hours. 8 tablet 11    scopolamine (TRANSDERM) 1 MG/3DAYS 72 hr patch Place 1 patch onto the skin every 72 hours - Transdermal 10 patch 11    sennosides (SENOKOT) 8.8 MG/5ML syrup 5 mLs by Per J Tube route 2 times daily 236 mL 11    Sharps Container (BD SHARPS ) MISC 1 Container as needed 1 each 1    silver nitrate (ARZOL SILVER NIT APPLICATORS) 75-25 % miscellaneous Apply topically as needed for wound care Apply Silver Nitrate Sticks every 2-3 days until granulation tissue resolved.  \"Roll\" tip of Silver Nitrate Q tip directly onto the granulation tissue-bulging tissue area.  Have Agency or Home Care Nurse administer this, if you prefer.  Take care not to touch any healthy tissue or skin.  The tissue will turn white and eventually black & scab off.  May repeat if needed in the future for recurrence. 30 applicator 0    sodium phosphate, mineral oil, magnesium citrate enema Instill 1 pink 187 ml enema twice daily as needed for constipation      STATIN NOT PRESCRIBED (INTENTIONAL) Previous liver issues, risks vs benefits felt to not warrant statin.    Discussed Oct 2022 visit      zinc oxide - white petrolatum (CRITIC-AID THICK MOIST BARRIER) 20-51% PSTE topical paste Apply 71 g topically every hour as needed for rash (Under J tube bumper when " needed for skin protection) 170 g 0    ZOLMitriptan (ZOMIG-ZMT) 5 MG ODT Take 1 tablet (5 mg) by mouth at onset of headache for migraine Dissolve tablet in the mouth. May repeat in 2 hours. Max 2 tablets/24 hours. 12 tablet 6     Current Facility-Administered Medications   Medication Dose Route Frequency Provider Last Rate Last Admin    Botulinum Toxin Type A (BOTOX) 200 units injection 155 Units  155 Units Intramuscular See Admin Instructions         Botulinum Toxin Type A (BOTOX) 200 units injection 155 Units  155 Units Intramuscular See Admin Instructions Rachelle العلي APRN CNP   155 Units at 05/23/24 1703         PHYSICAL EXAMINATION:  Vitals There were no vitals taken for this visit.   Wt   Wt Readings from Last 2 Encounters:   05/30/24 56.2 kg (124 lb)   04/16/24 57.5 kg (126 lb 12.2 oz)      Gen: Pt sitting up in NAD, interactive and cooperative on exam  Eyes: sclerae anicteric, no injection  ENT:  MMM  Resp: Breathing comfortably on exam, speaking in full sentences  Skin: No jaundice  Neuro: alert, oriented, answers questions appropriately        PERTINENT STUDIES:    Transferred Records on 04/12/2024   Component Date Value Ref Range Status    RETINOPATHY 04/12/2024 UNKNOWN   Final

## 2024-06-05 NOTE — NURSING NOTE
"chief complaint    Vitals:    06/05/24 0812   BP: 105/69   Pulse: 94   SpO2: 98%   Weight: 57.2 kg (126 lb)   Height: 1.727 m (5' 8\")       Body mass index is 19.16 kg/m .    Simran Wang MA    "

## 2024-06-05 NOTE — PATIENT INSTRUCTIONS
- ok to increase amitriptyline to 50 mg at bedtime, if pain improved, stopped there, if no change in pain after two weeks, ok to increase to 60 mg at bedtime  - continue pink lady enemas as you are  - continue the Movantik and sennosides  - stop Motegrity and monitor response  - if motility meds are truly no longer helping, we can work to scale back on these and continue enemas  - continue home pelvic floor PT exercises, we can re-refer you down the road if problems escalate  - ok to give IV pantoprazole more slowly if having nausea  - contact GI clinic if having marked pain or no response to home bowel regimen, we can consider sooner GGE enema  - keep up your great work on tube feeds --> agree with a trial of lactose-free tube feeds to see if bloating improved  - ok for oral intake as tolerated, please continue protein with coffee if able  - follow-up in 3 months, GI will arrange     If you have any questions, please don't hesitate to contact me through our GI RN Clinic Coordinator, Ashley Mane, at [phone].

## 2024-06-05 NOTE — LETTER
6/5/2024      Dinora Mcghee  [address] [facility] [address]      Dear Colleague,    Thank you for referring your patient, Dinora Mcghee, to the [facility] [place]. Please see a copy of my visit note below.    GI CLINIC VISIT    CC:  follow-up      ASSESSMENT/PLAN:    (K31.84) Gastroparesis  (K59.00) Constipation, unspecified constipation type  (Z93.4) Small bowel tube feeding (H)  (Z94.83) Status post pancreas transplantation (H)      As per my prior documentation, severe gastroparesis, with marked nausea/vomiting, pain, inability to tolerate po intake. Unresponsive to medications (promotility agents, domperidone, antiemetic regimen, etc). Use of Motegrity did not seem to improve much in the way of upper GI symptoms; there's been some concern that it may have contributed to HAs in past, though patient continued as for a time it seemed to help her move her bowels. Continues with G-tube for venting (currently near continuous). With surgical J-tube for TFs which she is generally tolerating - has more recently been up to 120 mL/hr and running for 10-12 hrs a day with maintenance of weight. Having bites of food on most days - some good days can tolerate small bowl of soft food (ice cream, cream of wheat) other days vomits whatever she ate many hours later.     Continues with antiemetics, has some IV through [facility]. Future consideration could be hydroxyzine.     For constipation - we discussed that she did not have to jump into colectomy but that it was important to be aware of what may or may not be an option for her. She seems to have failed/approaching failure of available medication options for constipation/motility. She will continue to think on the matter but hopes there is something else to try given her many prior surgeries.  We discussed that we could consider adding another med on top of her current regimen (adding back in Linzess or Amitiza) or swapping in Trulance  (previously stopped due to side effects, but one of those side effects was too loose of stools). As noted in the HPI, she has concern that only giving of enema produces any result and that cost of meds is becoming more of a struggle. After discussion, we ultimately decided to try a trial of slowly scaling back on some of these ineffective meds as as outlined below. She will keep us updated on progress as she goes.       We had previously discussed a second opinion at [facility] and she is willing to travel. She states that she's open to many treatment options and wonders if their are trials should could participated in. We will work to facilitate this.      She highlights that two of her children are getting  in the next 12 months and she wants to be well enough to be there and be an active participant.       - ok to increase amitriptyline to 50 mg at bedtime, if pain improved, stopped there, if no change in pain after two weeks, ok to increase to 60 mg at bedtime  - continue pink lady enemas as you are  - continue the Movantik and sennosides  - stop Motegrity and monitor response  - if motility meds are truly no longer helping, we can work to scale back on these and continue enemas  - continue home pelvic floor PT exercises, we can re-refer you down the road if problems escalate  - ok to give IV pantoprazole more slowly if having nausea  - contact GI clinic if having marked pain or no response to home bowel regimen, we can consider sooner GGE enema  - keep up your great work on tube feeds --> agree with a trial of lactose-free tube feeds to see if bloating improved  - ok for oral intake as tolerated, please continue protein with coffee if able  - follow-up in 3 months, GI will arrange        It was a pleasure to participate in the care of this patient; please contact us with any further questions.  A total of 64 face-to-face minutes was spent with this patient, >50% of which was counseling regarding  the above delineated issues. An additional (unbilled) time was spent on the days before and after the encounter doing chart review, documentation, care coordination, and further activities as noted above.    Vijaya Genao MD   of Medicine  Division of Gastroenterology, Hepatology and Nutrition  [facility]    ---------------------------------------------------------------------------------------------------  HPI:      Ms. 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 She was initially seen in general GI clinic by this writer on 11/1/2017 and has been/ seen by myself and ANA Gan since that time. Her pancreas txplt surgeon was Dr. Phoenix. She also follows with Dr. Park of GI in [facility]. Her last visit with me was last week (3/13/2024).           History summarized and updated from previous GI documentation. Please see previous notes for further details      Gastroparesis, nausea and vomiting  Venting G-tube  Tube feeds via J-tube  Initially diagnosed years ago with 2-hour study gastric emptying study at [facility]. Repeat testing here with 4-hour GES on 6/27/2016 with 49% remaining at 4 hours (?on pain meds at the time).  This was addressed with prior GJ tube in fall 2016, NG tube used for venting. Ultimately, after TPIAT, she was able to wean off of TFs and return to a regular diet.  Patient did have some persisting nausea and vomiting which she treated over the years with various medications including scopolamine patch, prochlorperazine, and promethazine. For GP in the past she had no response  to metoclopramide. She has not been able to try erythromycin or azithromycin due to anaphylactic event with prior azithromycin use (in ED in 2008) She has also been seen by neurology and psychology to assist with insomnia and with migraines (and any contribution they may have to nausea).       Unfortunately, things acutely worsened in November 2020.  An ED evaluation at that time was unremarkable and CT only revealed moderate stool burden, nonspecific fluid in small intestine.  Theses episodes continued to recur.       Bowel regimen was adjusted to ensure adequate output. Dietary changes were made (freq, small meals to liquid-only) and support with antiemetics. Initially these dietary changes seemed to help, but over time the response waned. Another course of rifaximin (previously successful at addressing bloating and stool issues) was not helpful for her pain, bloating/fullness, and n/v. Amitriptyline was increased to 60 mg nightly with no change in discomfort.    From winter 2020 to May/[place] she lost about 16 lbs (from 156-->140 lbs). She continued to experience more pronounced post-prandial pain and fullness and had vomiting of food after eating. Work-up for alternate causes (with SBFT, CTE, CTA) was normal.       A GJ was placed in Sept 2021, but this was replaced a few times due to J-tube coiling in stomach as well as G-tube clogging/malfunction. On 10/19/21 she had placement of a weighted GJ but continued to struggle with discomfort at insertion site, clogging/venting issues. Ultimately a direct jejunostomy was done and after struggling with this for awhile it was removed. She worked hard to return to an oral diet.      By summer 2022, Ms. Loo had only a G-tube for venting.  At that time she was tolerating one small plate/1 cup/1 bowl of food without vomiting, 70% of the time. She would have 5-6 of these servings a day (~2399-3080 Kcal). Ms. Loo also had regular protein supplement drinks and hydration  drinks. Her weight stabilized around 140 lbs. For nausea, she continued on her chronic scopolamine patch as well as prochlorperazine and promethazine about every 6-8 hours.       Later in 2022 she had progressive decline in her ability to tolerate oral intake and continued to lose weight. She was initiated on domperidone by Dr. Mandeep Park, but did not have response even with up-titration of dosing. Small bowel feeds were discussed and her G (KISHORE-KEY) was replaced with a G-J a couple times, but had issues with J-tube extension coiling. Ultimately on 12/30/22, Dr. Bauer placed a direct J in the OR; extensive BAUDILIO and a resection of 15 cm of SB with dense adhesions was also done. TFs were started and continued in earnest from that point on.      In Jan 2023, unfortunately a perforation occurred during G and J tube exchange. She went to the OR that day. Jejunal perforation was repaired with bowel resection and additional BAUDILIO was performed. She was hospitalized thereafter and TFs were restarted.      Through early 2023, she felt better on just jejunal feeds with reduction in emesis, though still experienced nausea  for which she took: 1 scopolamine patch daily, IV promethazine once daily with J-tube promethazine at night, and compazine 10 mg - once daily. Her TFs were running at 45 mL for 18 hrs a day with oral intake is just bites of food for pleasure. She continued to vent her G-tube frequently - mostly seeing clear, colored liquid - no TFs. She continued on Relizorb digestive enzyme cartridge and did an IVF bolus via port each day.     In fall 2023,  Ms. Loo reported an ED presentation due to symptoms concerning for SBO, though imaging was unremarkable. She stated she had two hours of diarrhea after leaving the ED and felt markedly better. At her last visit, she was vomiting about 5 times a week. This is better with her doing 24/7 venting. She has very little in her emesis, unless it occurs shortly after her  pills are given via the G-tube. In addition to her chronic antiemetics, she started buprenorphine patch and medical MJ which were helpful. She continued on J-tube feeds (for all nutritional needs, was not tolerating things by mouth).  She did feel she had leaking around the J-tube, but this has since decreased. She reiterates that she very much does not want to re-do the tube unless absolutely necessary.     By early 2024, nausea and emesis have continued to impact any oral intake. Jejunal feeds continue (see constipation below) and symptoms worsened when her bowels were backed up (see constipation below).      In March 2024, emesis was a bit better though persisting. She had nausea each day, though vomiting was only about 3 days a week.    For TFs she was getting in 4 cans/day, 1500 kcal a day, running at 90 mL/hr for 11 hours, eating a small amount. She is worried she is losing weight again. At that visit we discussed altering TF goals with [facility] team.     Today, Ms. Loo has further increased TFs and is running at about 120 mL/hr for 10-12 hours a day. She can tolerate some bites of food and on a good day a small bowl of ice cream or Cream of Wheat. There are days when oral intake does not go as well and she recalls an episode where she vomited yogurt and blueberries that she'd had 7 hours earlier. Weight is stable, but not gaining as she'd like.        Constipation/irregular bowel movements/bloating  Patient reports history of ongoing constipation for over 20 years after having her first child.  She has tried multiple interventions including regular bowel cleanouts, MiraLAX, senna, milk of magnesium, Linzess, and Amitiza for which she was on when she first came to  GI clinic.  Note, patient has been on Creon in the past (currently on Relizorb digestive enzyme cartridge).    Amitiza had worked for awhile then lost effectiveness. She was trialed on Trulance but reported frequent loose stools in the  "initial days (including nocturnal stooling and incontinence) which prompted her to stop. Trial of rifaximin in the past with with improvement in bloating and stool consistency though, as stated above, a re-trial for her pain, n/v, and fullness was not successful.    Motegrity was then started with some success, though noted HAs. We discussed a possible switch in her regimen to address this, though she was most comfortable with continuing Motegrity and monitoring her symptoms.  Previously discussed combining daily constipation medications; noted patient reports cost for Amitiza was quite a lot  (running $1000+/month).     By 2022, she was on Motegrity daily as well as Miralax 1 capful BID, 4 senna a day, 1000 mg Mag citrate daily, and stool softeners. With this she would have no BMs for 3-4 days (though has gone anywhere from 1-8 days), then have a day of emptying where she has multiple BMs over a day. These were often \"enormous\" and she felt empty when complete. Noted her bowel pattern is complicated by her need for antiemetics. When she is \"backed up\" she will experience early satiety.       By late 2022, she felt BMs were improved with restart of TFs. She stopped Motegrity/prucalopride around 12/30/2022. She was taking only 2.5 mL of liquid senna BID (total 8.8 mg daily).  With this Ms. Loo, was moving her bowels 3-4 days out of a week. On the days she moved her bowels it was typically more than once (2-3 times). Stool consistency was soft, \"ribbon-like\" stools. She denied straining. When asked about HAs she did feel these were better - unclear if this is because she went off Motegrity or if due to treatment of migraines  (received Botox injections.)     In fall 2023, she's restarted Motegrity, Senna twice daily (5 mL) with Miralax prn. She continued on amitriptyline (discomfort). With this she had a BM 5 out of 7 days , nearly always in the morning. She may have a few more  BMs later in the day, though she's " "done by early afternoon. Stools were often formed, smooth, and \"sticky\", occasionally loose.  She continued on Relizorb.     In early 2024,  Ms. Loo had increasing nausea, abdominal pain, and decreased stool output. Please see [employer] messages and telephone encounters for further details. Acute CT imaging did not reveal an SBO or other pathology. Home bowel regimen was not particularly successful. She ended up getting a soapsuds enema at her local ED with results and some improved symptoms. We pursued a therapeutic gastrograffin enema and aggressive bowel regimen to ensure colonic emptying to address worsening pain, discomfort, and nausea.  We then adjusted her bowel regimen with plan for periodic GGE and consideration of adding Movantik (to be discussed with pain team as well).       In March 2024, she continued with pelvic floor PT and was feeling a little more in sync with biofeedback. She continued home PT exercises and noted some improvement with bladder control too.   Bowel pattern was: BMs on 5/7 days, about 4 of the 7 days had good amount of stool output. She has been doing enemas about every 2-3 days (just had two days without enemas, then did one today with very large output.). Stools are all loose.     Today, Ms. Loo reports continuing Motegrity and as started Movantik 60 mg through pain clinic. She continues on sennosides 8.8 mg BID and is doing pink lady enemas about 3-4 times a week choosing to administer one when she feel uncomfortable.She is able to hold the enema in for awhile, certainly 15-30 min. Afterwards she will pass a palm-size stool - ice cream consistency with a lot of pink water. She passes a lot of flatus as well.  She has some relief of discomfort after evacuating and passing this gas, though she doesn't feel cramping and bloating improve totally.       Since the addition of the Movantik she note sure anything has changed. She feels that only the enema makes a difference and she " hasn't really felt that the other medication are doing anything anymore. As she's shared in the past - cost is a significant issue and she is paying a lot out-of-pocket for her complex med regimen.       She was able to see Dr. Branch of colorectal surgery for discussion of potential surgical options. Total abdominal colectomy with likely ileostomy (known pelvic floor dysfunction) was reviewed though possible ileorectal anastomosis diverting loop ileostomy was discussed.  She did feel it was overwhelming to have to think about surgery given what's she's previously gone through and the various options. She recently saw our ostomy nurse for an informational session.         GERD, Dysphagia   Summarized/taken from prior documentation  Patient experiences GERD as substernal and throat burning with nocturnal relaxant causing choking. She reports a prior Schatzki's ring requiring previous dilation. Manometry in 2015 was normal, and pH impedence at that time had a DeMeester of 24 with positive symptoms association. Patient has treated GERD symptoms with some combination of BID PPI, Carafate, H2 blocker, and Tums in the past. She'd had issues with access to liquid PPI due to insurance and liquid famotidine had not be sufficient for symptoms. Omeprazole by mouth seemed to never pass the stomach/get absorbed and would come out the G-tube when vented even when clamped for an hour or so (clamping longer led to more severe pain and symptoms). IV pantoprazole was ultimately started (issues digesting the pill).      Today, not discussed.         Pain:   In regards to pain she had followed initially in our pain clinic for management of bulging disk as well as chronic abdominal pain for which she previously had local injections. Ms. Loo shares that much of her abdominal pain is a lot better after BAUDILIO and her first SB resection.  She was off of all pain medications except Tylenol and methycarbomal (for back pain) until her  "surgery and hospitalization for management of her perforation. She's continued to have pain at both her G and J-tube site.      She follows with [facility] at this time. She is enrolled in spinal cord stimulator study and hoping thi helps with pain. She was told this may have some impact on her nausea as well.      In March  she had a has a few sites that are bothering her:  G-tube site: sharp pain at site  Pelvis: intermittent pelvic pain described like \"labor pain\"  PICC line: hurts with dressing/tape change as well as injections  Chronic abd pain: continues on pain meds, baclofen which brings this pain down to a 3/10      -We'd started amitriptyline and tried to address some of the chronic discomfort. Today, she continues on amitriptyline 40 mg at bedtime, though is not sure it's helped.        PROBLEM LIST  Patient Active Problem List    Diagnosis Date Noted    Bacteremia 11/10/2023     Priority: Medium    Abdominal pain, unspecified abdominal location 11/09/2023     Priority: Medium    Cholangitis (H28) 06/07/2023     Priority: Medium    On total parenteral nutrition (TPN) 06/04/2023     Priority: Medium    Fever, unspecified fever cause 06/04/2023     Priority: Medium    Chronic pancreatitis, unspecified pancreatitis type (H) 06/04/2023     Priority: Medium    History of food intolerance 05/09/2023     Priority: Medium    Malnutrition, unspecified type (H24) 05/09/2023     Priority: Medium    Nausea and vomiting, unspecified vomiting type 05/09/2023     Priority: Medium    Major depression, single episode 03/06/2023     Priority: Medium    Acute postoperative abdominal pain 01/31/2023     Priority: Medium    Feeding intolerance 12/30/2022     Priority: Medium    Myofascial pain 10/14/2022     Priority: Medium     Added automatically from request for surgery [id number]      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  pancreas " and spleen removed, islet cells transplanted into liver    3 classes of vaccines needed .    1. Pneumococcal vaccines are as follows:       PCV 13 - one time dose (was given 2017)       PPSV23 - (given 12/1/16); repeat q 5 years      ROLE of PCV 20???    2. Meningococcal vaccines     Menactra - (last given 12/1/16) -  repeat q 5 yrs   NOTE: need to wait 4 wks after PCV 13 has been given.   OR - give Menveo instead as it can be given same time as PCV 13    AND   Bexcero - (got 12/9/16) - does not need to be repeated.     3. The Hib vaccine - (got 12/9/16) - does not need to be repeated.      Poisoning by drug 05/09/2022     Priority: Medium     Formatting of this note might be different from the original.  Per Care Everywhere review:  All oral, IV and injectable steroids are contraindicated (unless in life threatening situations) in [facility] [employer] transplant recipients. They can cause irreversible loss of islet cell function. Please contact patient's transplant care coordinator ADI Gaffney RN at [phone]/pager [phone] and/or endocrinologist prior to administration.      Type 1 diabetes mellitus without complication (H) 05/09/2022     Priority: Medium     Formatting of this note might be different from the original.  ACTUALLY - DM 3c - pathogenic diabetes as no pancreas.  (pancreas and spleen removed, islet cells transplanted into liver)    Seeing ENDO at [facility] - Dr Erum Melissa      Intra-abdominal abscess (H) 12/03/2021     Priority: Medium    Postprocedural intraabdominal abscess 12/03/2021     Priority: Medium    Gastrostomy tube obstruction (H) 11/27/2021     Priority: Medium    Abdominal pain, generalized 09/18/2021     Priority: Medium    Adult failure to thrive 08/16/2021     Priority: Medium     Added automatically from request for surgery [id number]      Hypoglycemia 08/05/2021     Priority: Medium    Iron deficiency anemia 03/22/2019     Priority: Medium    Headache, chronic migraine without aura  09/18/2018     Priority: Medium    Chronic pain syndrome 09/18/2018     Priority: Medium    S/P hernia repair 08/23/2018     Priority: Medium    Incisional hernia, without obstruction or gangrene 05/20/2018     Priority: Medium    Adhesive capsulitis of shoulder, unspecified laterality 11/14/2017     Priority: Medium    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  Excess IgG4  [facility]      Gastroparesis      Priority: Medium    ACP (advance care planning) 03/28/2017     Priority: Medium     Advance Care Planning 3/28/2017: Receipt of ACP document:  Received: Health Care Directive which was witnessed or notarized on 12/8/16.  Document previously scanned on 1/12/17.  Validation form completed and scanned.  Code Status reflects choices in most recent ACP document..  Confirmed/documented designated decision maker(s).  Added by May Baires   Advance Care Planning Liaison              Pancreatic insufficiency 01/17/2017     Priority: Medium    Diabetes insipidus (H24) 01/05/2017     Priority: Medium     Formatting of this note might be different from the original.  Diabetes Insipidus (DI)  Per external records.  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  Depression NOS      Other type of intractable migraine      Priority: Medium     Diagnosis updated by automated process. Provider to review and confirm.      GERD (gastroesophageal reflux disease)  12/01/2010     Priority: Medium    Lumbago 04/18/2005     Priority: Medium     History of L5-S1 degenerative disk disease.        Sensorineural hearing loss 01/10/2005     Priority: Medium     Problem list name updated by automated process. Provider to review      Vertiginous syndrome and labyrinthine disorder 01/10/2005     Priority: Medium     Problem list name updated by automated process. Provider to review  IMO Regulatory Load OCT 2020      Sprain and strain of other specified sites of hip and thigh 12/09/2002     Priority: Medium    Chondromalacia of patella 12/09/2002     Priority: Medium    Need for prophylactic immunotherapy      Priority: Medium     trees, grass, srw, dust mites, cat      Allergic rhinitis due to other allergen 12/21/2001     Priority: Medium    Hypothyroidism      Priority: Medium     Problem list name updated by automated process. Provider to review         PERTINENT MEDICATIONS:  Current Outpatient Medications   Medication Sig Dispense Refill    acetaminophen (TYLENOL) 325 MG/10.15ML solution 20.3 mLs (650 mg) by Per J Tube route every 6 hours as needed for mild pain or fever 473 mL 4    baclofen (LIORESAL) 10 MG tablet 1-2 tablets (10-20 mg) by Per G Tube route 3 times daily 60 tablet 1    buprenorphine (SUBUTEX) 2 MG SUBL sublingual tablet Place 2 mg under the tongue 2 times daily      cetirizine (ZYRTEC) 10 MG tablet 1 tablet (10 mg) by Per G Tube route 2 times daily 60 tablet 11    COMPOUNDED NON-CONTROLLED SUBSTANCE (CMPD RX) - PHARMACY TO MIX COMPOUNDED MEDICATION Administer 40 mg amitriptyline suspension (2 mg/mL) via G-J tube at bedtime. 600 mL 5    COMPOUNDED NON-CONTROLLED SUBSTANCE (CMPD RX) - PHARMACY TO MIX COMPOUNDED MEDICATION Place 1 enema rectally 2 times daily as needed (for constipation) 60 each 3    COMPRO 25 MG suppository INSERT ONE SUPPOSITORY RECTALLY EVERY 12 HOURS AS NEEDED FOR NAUSEA 10 suppository 1    Continuous Blood Gluc Sensor (FREESTYLE LISA 3 SENSOR)  "MISC CHANGE EVERY 14 DAYS 2 each 11    Digestive Enzyme Cartridge (RELIZORB) MARCO 2 Cartridges daily 60 each 6    diphenhydrAMINE (BENADRYL) 12.5 MG/5ML solution Take 25 mg by mouth 4 times daily as needed for other (nausea)      droNABinol (SYNDROS) 5 MG/ML oral soln Take 0.5 mLs (2.5 mg) by mouth 2 times daily 60 mL 0    DULoxetine HCl 60 MG CSDR 1 capsule (60 mg) by Per J Tube route daily Sprinkle caps for feeding tube 30 capsule 1    estradiol (VAGIFEM) 10 MCG TABS vaginal tablet INSERT 1 TABLET(10 MCG) VAGINALLY 2 TIMES A WEEK 24 tablet 2    famotidine (PEPCID) 40 MG/5ML suspension 2.5 mLs (20 mg) by Per J Tube route daily 50 mL 4    glucagon 1 MG kit Give 0.1 to 0.15mg( 10-15 units on Insulin sryinge) subcutaneous  every 15 minutes PRN for hypoglycemia. Remix new kit q24hr. Needs up to 3 kit/week. 10 each 3    glucose 40 % GEL gel Take 15-30 g by mouth every 15 minutes as needed for low blood sugar 3 Tube 2    insulin aspart (NOVOLOG PEN) 100 UNIT/ML pen Take 1 unit if BG is 175-275, and 2 units if >275 up to every 4 hours as needed.  \"Prime\" pen needle with 2 unit airshot before giving, needs supply for 15 unit/day. 15 mL 11    insulin detemir (LEVEMIR PEN) 100 UNIT/ML pen Take 7 to 10 units per day, adjust as directed by MD.  Do 2 unit 'prime' before dosing, needs 12 units/day supply. 15 mL 5    insulin syringe-needle U-100 (30G X 1/2\" 0.3 ML) 30G X 1/2\" 0.3 ML miscellaneous Use 3 syringes daily or as directed. 100 each 11    lactated ringers infusion Inject 1,000 mLs into the vein daily as needed      levothyroxine (SYNTHROID) 25 mcg/mL SUSP 5.48 mLs (137 mcg) by Per J Tube route daily 500 mL 3    lidocaine (LIDODERM) 5 % patch Place onto the skin every 24 hours To prevent lidocaine toxicity, patient should be patch free for 12 hrs daily.      lipase-protease-amylase (CREON) 28380-81263-890940 units CPEP per EC capsule Take 3-4 capsules by mouth 3 times daily (with meals) With oral meals 150 capsule 11    " "methocarbamol (ROBAXIN) 750 MG tablet Take 1 tablet (750 mg) by mouth 4 times daily as needed for muscle spasms 120 tablet 11    MOTEGRITY 2 MG tablet TAKE ONE TABLET BY MOUTH ONCE DAILY 30 tablet 6    naloxegol (MOVANTIK) 12.5 MG TABS tablet Take 25 mg by mouth every morning (before breakfast)      Nutritional Supplements (BOOST HIGH PROTEIN) LIQD After above baseline labs are drawn, give: 6 mL/kg to maximum of 360 mL; the beverage is to be consumed within 5 minutes.  0    pantoprazole (PROTONIX) 40 mg IV push injection Inject 40 mg into the vein daily (with breakfast) 30 each 11    prochlorperazine (COMPAZINE) 10 MG tablet Take 1 tablet (10 mg) by mouth every 6 hours as needed for nausea or vomiting 120 tablet 3    promethazine 25 mg Inject 25 mg into the vein 3 times daily      rimegepant (NURTEC) 75 MG ODT tablet Place 1 tablet (75 mg) under the tongue daily as needed for migraine Maximum of 1 tablet every 24 hours. 8 tablet 11    scopolamine (TRANSDERM) 1 MG/3DAYS 72 hr patch Place 1 patch onto the skin every 72 hours - Transdermal 10 patch 11    sennosides (SENOKOT) 8.8 MG/5ML syrup 5 mLs by Per J Tube route 2 times daily 236 mL 11    Sharps Container (BD SHARPS ) MISC 1 Container as needed 1 each 1    silver nitrate (ARZOL SILVER NIT APPLICATORS) 75-25 % miscellaneous Apply topically as needed for wound care Apply Silver Nitrate Sticks every 2-3 days until granulation tissue resolved.  \"Roll\" tip of Silver Nitrate Q tip directly onto the granulation tissue-bulging tissue area.  Have Agency or Home Care Nurse administer this, if you prefer.  Take care not to touch any healthy tissue or skin.  The tissue will turn white and eventually black & scab off.  May repeat if needed in the future for recurrence. 30 applicator 0    sodium phosphate, mineral oil, magnesium citrate enema Instill 1 pink 187 ml enema twice daily as needed for constipation      STATIN NOT PRESCRIBED (INTENTIONAL) Previous liver " issues, risks vs benefits felt to not warrant statin.    Discussed Oct 2022 visit      zinc oxide - white petrolatum (CRITIC-AID THICK MOIST BARRIER) 20-51% PSTE topical paste Apply 71 g topically every hour as needed for rash (Under J tube bumper when needed for skin protection) 170 g 0    ZOLMitriptan (ZOMIG-ZMT) 5 MG ODT Take 1 tablet (5 mg) by mouth at onset of headache for migraine Dissolve tablet in the mouth. May repeat in 2 hours. Max 2 tablets/24 hours. 12 tablet 6     Current Facility-Administered Medications   Medication Dose Route Frequency Provider Last Rate Last Admin    Botulinum Toxin Type A (BOTOX) 200 units injection 155 Units  155 Units Intramuscular See Admin Instructions         Botulinum Toxin Type A (BOTOX) 200 units injection 155 Units  155 Units Intramuscular See Admin Instructions Rachelle العلي APRN CNP   155 Units at 05/23/24 1703         PHYSICAL EXAMINATION:  Vitals There were no vitals taken for this visit.   Wt   Wt Readings from Last 2 Encounters:   05/30/24 56.2 kg (124 lb)   04/16/24 57.5 kg (126 lb 12.2 oz)      Gen: Pt sitting up in NAD, interactive and cooperative on exam  Eyes: sclerae anicteric, no injection  ENT:  MMM  Resp: Breathing comfortably on exam, speaking in full sentences  Skin: No jaundice  Neuro: alert, oriented, answers questions appropriately        PERTINENT STUDIES:    Transferred Records on 04/12/2024   Component Date Value Ref Range Status    RETINOPATHY 04/12/2024 UNKNOWN   Final       Again, thank you for allowing me to participate in the care of your patient.      Sincerely,    Vijaya Genao MD

## 2024-06-06 DIAGNOSIS — K91.2 HYPOGLYCEMIA AFTER GI (GASTROINTESTINAL) SURGERY: ICD-10-CM

## 2024-06-06 DIAGNOSIS — E23.2 DIABETES INSIPIDUS (H): ICD-10-CM

## 2024-06-06 DIAGNOSIS — E10.9 TYPE 1 DIABETES MELLITUS WITHOUT COMPLICATION (H): ICD-10-CM

## 2024-06-06 DIAGNOSIS — E13.9 POST-PANCREATECTOMY DIABETES (H): Primary | ICD-10-CM

## 2024-06-06 DIAGNOSIS — Z90.410 POST-PANCREATECTOMY DIABETES (H): Primary | ICD-10-CM

## 2024-06-06 DIAGNOSIS — E89.1 POST-PANCREATECTOMY DIABETES (H): Primary | ICD-10-CM

## 2024-06-06 RX ORDER — INSULIN ASPART 100 [IU]/ML
INJECTION, SOLUTION INTRAVENOUS; SUBCUTANEOUS
Qty: 15 ML | Refills: 11 | Status: SHIPPED | OUTPATIENT
Start: 2024-06-06

## 2024-06-08 NOTE — PROGRESS NOTES
Colon and Rectal Surgery Consult Clinic Note    Referring provider:  Vijaya Genao MD  [address] [place] [address]       RE: Dinora Mcghee  : 1965  HAIM: 5/15/2024      Dinora Mcghee is a very pleasant 59 year old female with history of total pancreatectomy with islet auto transplantation, severe gastroparesis, gastrostomy tube present (for venting), jejunostomy tube dependent (for tube feeds) presenting today to discuss potential colectomy for constipation. She was subsequently sent to the [facility] for an opinion on this and a new patient consultation.     History of acute pancreatitis which was diagnosis in  at [facility]. An endoscopic retrograde cholangiopancreatography at that time was performed with issues with the pancreatic duct eventually with chronic pancreatitis ultimately s/p total pancreatectomy and islet autotransplant. Postoperatively she developed type 1 diabetes mellitus.   Diagnosed with gastroparesis and chronic pancreatitis by Dr. Park in 2015 with multiple hospitalizations, stents.   As well as at [facility] in  had a GJ tube placed. This helped initially, and after her TPIAT she also experienced some improvement. She had a few years that she was able to eat but eventually transitioned back to tube feeds via her J-tube. She has a venting G-tube and is on chronic antiemetics (daily scopolamine patch, IV promethazine, J-tube promethazine, and compazine).   Chronic constipation since having children over 20 years ago. She has been on multiple medications including Miralax, senna, milk of magnesia, Linzess, Amitiza (lost effectiveness), Trulance (caused diarrhea and fecal incontinence), Motegrity.   Her current regimen is Motegrity, Senna BID, Miralax prn. With this she still has constipation and in 2024 she presented to a local ED, where she received two enemas with improvement. She also has had abdominal x-rays with  "gastrografin enema and passes a significant amount of stool following these. She follows with Dr. Genao who recommended daily pink lady enemas. However, this caused nocturnal fecal leakage and so the enemas were decreased to every other day.   Pelvic floor testing completed on 24 suggestive of pelvic floor dysfunction and obstructive defecation. On defecography, she was unable to evacuate any rectal contrast due to non-relaxation.     She recently did pelvic floor physical therapy with biofeedback. Her last visit was in 2024 and there was some improvement in relaxation in her pelvic floor muscles.  She has a complex past surgical history includin, , : C-sections  : laparoscopic cholecystectomy  : total abdominal hysterectomy with bilateral salpingoopherectomy  Appendectomy  2016: laparoscopic total pancreatectomy, islet cell autotransplant, splenectomy, gastrojejunostomy tube duodenojejunostomy, Vera-Y reconstruction, liver biopsy, lysis of adhesions >60 minutes  2018: laparoscopic incisional hernia (2 cm close to umbilicus) repair with 12 cm round Parietex mesh  2022: exploratory laparotomy, extensive lysis of adhesions, small bowel resection, placement of gastric jejunostomy for enteral feeding  2023: exploratory laparotomy, lysis of adhesions, perforated jejunostomy tube resection, replacement of jejunostomy tube  Colonoscopy 2023 with two 2 mm tubular adenomas, melanosis coli in cecum and ascending colon, normal random biopsies, sigmoid diverticulosis, internal and external hemorrhoids.    Dinora feels very limited by her constipation symptoms. She gets very \"backed up\" after a few days and this worsens her nausea. She uses the pink lady enemas every few days but still has issues with nocturnal leakage of loose stool on the evenings that she uses the enema. No leakage otherwise. She only has a bowel movement after using an enema and has not had a \"spontaneous\" " bowel movement for 6 months.     PLEASE SEE NOTE BELOW FOR PHYSICAL EXAMINATION, REVIEW OF SYSTEMS, AND OTHER HISTORY.    Assessment/Plan: 59 year old female with a complex medical and surgical history including total pancreatectomy with islet auto transplantation, severe gastroparesis, gastrostomy tube present (for venting), jejunostomy tube dependent (for tube feeds) presenting today for medically refractive constipation due to colonic dysmotility. She also has pelvic floor dysfunction and obstructive defecation, but recently underwent pelvic floor physical therapy with improvement noted. We discussed options for surgical intervention. With the colonic dysmotility, we discussed that a total abdominal colectomy would be reasonable. We could entertain an ileorectal anastomosis with diverting loop ileostomy, but she may still struggle with this due to pelvic floor dysfunction and nocturnal leakage after enemas. We also discussed the option of a total abdominal colectomy with end ileostomy (and leaving a rectal stump). She would like to think about it and learn more. We will provide her with some resources and get her in touch with our wound ostomy nurse for a consultation. She will reach out if she wants to move forward with surgery. Patient's questions were answered to her stated satisfaction and she is in agreement with this plan.     60 minutes spent on the date of encounter performing chart review, history and exam, documentation and further activities as noted above.    The Division of Colon and Rectal Surgery at [facility] is committed to advancing discovery and innovation in human health through research. Dinora Mcghee is willing to be contacted by our research team if they qualify for any future research studies:  N/A    Kaylene Branch MD  Colon and Rectal Surgery Staff  [facility]      -------------------------------------------------------------------------------------------------------------------      Medical history:  Past Medical History:   Diagnosis Date    Allergic rhinitis, cause unspecified     Allergy to other foods     strawberries, apples, celeries, alice, watermelon    Arthritis     left knee    Choledocholithiasis     long after cholecystectomy    Chronic abdominal pain     Chronic constipation     Chronic nausea     Chronic pancreatitis (H)     Degeneration of lumbar or lumbosacral intervertebral disc     Esophageal reflux     w/ hiatal hernia    Gastroparesis     Hiatal hernia     History of pituitary adenoma     s/p resection    Hypothyroidism     Migraines     Mild hyperlipidemia     On tube feeding diet     presence of GJ tube    Pancreatic disease     PD stricture, 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 endometriosis growth    APPENDECTOMY       SECTION  1993    COLONOSCOPY  2014    COLONOSCOPY N/A 2023    Procedure: COLONOSCOPY, WITH POLYPECTOMY AND BIOPSY;  Surgeon: Percy Chamorro MD;  Location:  GI    ENDOSCOPIC INSERTION TUBE GASTROSTOMY N/A 2021    Procedure: Gastrojejonostomy placement with jejunopexy, PEG tube exchange;  Surgeon: Zackery Montoya MD;  Location:  OR    ENDOSCOPIC RETROGRADE CHOLANGIOPANCREATOGRAM N/A 2015    Procedure: ENDOSCOPIC RETROGRADE CHOLANGIOPANCREATOGRAM;  Surgeon: Mandeep Park MD;  Location:  OR    ENDOSCOPIC RETROGRADE CHOLANGIOPANCREATOGRAM N/A 2016    Procedure: COMBINED ENDOSCOPIC RETROGRADE CHOLANGIOPANCREATOGRAPHY, PLACE TUBE/STENT;   Surgeon: Mandeep Park MD;  [facility]    ENDOSCOPIC RETROGRADE CHOLANGIOPANCREATOGRAM N/A 03/17/2016    Procedure: COMBINED ENDOSCOPIC RETROGRADE CHOLANGIOPANCREATOGRAPHY, REMOVE FOREIGN BODY OR STENT/TUBE;  Surgeon: Mandeep Park MD;  [facility]    ENDOSCOPIC RETROGRADE CHOLANGIOPANCREATOGRAM N/A 08/02/2016    Procedure: COMBINED ENDOSCOPIC RETROGRADE CHOLANGIOPANCREATOGRAPHY, PLACE TUBE/STENT;  Surgeon: Mandeep Park MD;  [facility]    ENDOSCOPIC RETROGRADE CHOLANGIOPANCREATOGRAM N/A 08/26/2016    Procedure: COMBINED ENDOSCOPIC RETROGRADE CHOLANGIOPANCREATOGRAPHY, REMOVE FOREIGN BODY OR STENT/TUBE;  Surgeon: Mandeep Park MD;  [facility]    ENDOSCOPIC ULTRASOUND UPPER GASTROINTESTINAL TRACT (GI) N/A 10/03/2016    Procedure: ENDOSCOPIC ULTRASOUND, ESOPHAGOSCOPY / UPPER GASTROINTESTINAL TRACT (GI);  Surgeon: Guru Jose Rodas MD;  Location:  OR    ENT SURGERY  1989    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    ENTEROSCOPY SMALL BOWEL N/A 02/03/2022    Procedure: ENTEROSCOPY with possible fistula closure;  Surgeon: Francisco Dodson MD;  Location:  GI    ESOPHAGOSCOPY, GASTROSCOPY, DUODENOSCOPY (EGD), COMBINED N/A 06/24/2015    Procedure: COMBINED ESOPHAGOSCOPY, GASTROSCOPY, DUODENOSCOPY (EGD), REMOVE FOREIGN BODY;  Surgeon: Mandeep Park MD;  Location:  GI    ESOPHAGOSCOPY, GASTROSCOPY, DUODENOSCOPY (EGD), COMBINED N/A 10/25/2015    Procedure: COMBINED ESOPHAGOSCOPY, GASTROSCOPY, DUODENOSCOPY (EGD);  Surgeon: Sammy Amaro MD;  Location:  GI    ESOPHAGOSCOPY, GASTROSCOPY, DUODENOSCOPY (EGD), COMBINED N/A 10/25/2015    Procedure: COMBINED ESOPHAGOSCOPY, GASTROSCOPY, DUODENOSCOPY (EGD), BIOPSY SINGLE OR MULTIPLE;  Surgeon: Sammy Amaro MD;  Location:  GI    ESOPHAGOSCOPY, GASTROSCOPY, DUODENOSCOPY (EGD), COMBINED N/A 01/31/2023    Procedure: ESOPHAGOGASTRODUODENOSCOPY (EGD) with peg replacement ;  Surgeon:  Mandeep Park MD;  [facility]    ESOPHAGOSCOPY, GASTROSCOPY, DUODENOSCOPY (EGD), DILATATION, COMBINED      EXCISE LESION TRUNK N/A 04/17/2017    Procedure: EXCISE LESION TRUNK;  Removal of Abdominal Foreign Body;  Surgeon: Nestor Phoenix MD;  [facility]    HC ESOPH/GAS REFLUX TEST W NASAL IMPED >1 HR N/A 11/19/2015    Procedure: ESOPHAGEAL IMPEDENCE FUNCTION TEST WITH 24 HOUR PH GREATER THAN 1 HOUR;  Surgeon: Thiago Apple MD;  [facility]    HC UGI ENDOSCOPY DIAG W BIOPSY  09/17/2008    HC UGI ENDOSCOPY DIAG W BIOPSY  09/27/2012    HC UGI ENDOSCOPY W ESOPHAGEAL DILATION BALLOON <30MM  09/17/2008    HC UGI ENDOSCOPY W EUS N/A 05/05/2015    Procedure: COMBINED ENDOSCOPIC ULTRASOUND, ESOPHAGOSCOPY, GASTROSCOPY, DUODENOSCOPY (EGD);  Surgeon: Wm Dueñas MD;  [facility]    HC WRIST ARTHROSCOP,RELEASE XVERS LIG Bilateral 12/17/2008    INJECT TRANSVERSUS ABDOMINIS PLANE (TAP) BLOCK BILATERAL Left 09/22/2016    Procedure: INJECT TRANSVERSUS ABDOMINIS PLANE (TAP) BLOCK BILATERAL;  Surgeon: Dickson Corrigan MD;  [facility]    INJECT TRIGGER POINT Bilateral 09/08/2022    Procedure: Abdominal trigger point injection with ultrasound;  Surgeon: Monika Mahajan MD;  [facility]    INJECT TRIGGER POINT SINGLE / MULTIPLE 3 OR MORE MUSCLES Right 11/15/2022    Procedure: Trigger point injections right abdomen with ultrasound guidance;  Surgeon: Monika Mahajan MD;  Location: [facility] OR    IR CHEST PORT PLACEMENT < 5 YRS OF AGE  06/10/2022    IR CVC TUNNEL PLACEMENT > 5 YRS OF AGE  4/15/2024    IR PORT REMOVAL RIGHT  11/09/2023    laparoscopic pineda  01/01/1995    LAPAROSCOPIC HERNIORRHAPHY INCISIONAL N/A 08/23/2018    Procedure: LAPAROSCOPIC HERNIORRHAPHY INCISIONAL;  Laparoscopic Incisional Hernia Repair with Symbotex Mesh Implant;  Surgeon: Nestor Phoenix MD;  [facility]    LAPAROSCOPIC PANCREATECTOMY, TRANSPLANT AUTO ISLET CELL N/A 12/28/2016    Procedure: LAPAROSCOPIC  PANCREATECTOMY, TRANSPLANT AUTO ISLET CELL;  Surgeon: Nestor Phoenix MD;  [facility]    LAPAROTOMY EXPLORATORY N/A 01/31/2023    Procedure: Exploratory Laparotomy, lysis of adhesions, Perforated J-Junostomy Resection, Replace J-Junostomy site;  Surgeon: Elvre Bauer MD;  [facility]    LAPAROTOMY, LYSIS ADHESIONS, COMBINED N/A 01/31/2023    Procedure: Dilatation of jejunostomy feeding tube, track with placement of jejunostomy tube with fluoroscopy;  Surgeon: Elver Bauer MD;  [facility]    PICC DOUBLE LUMEN PLACEMENT Left 11/13/2023    Basilic Vein 5F DL 43 cm    REMOVE AND REPLACE BREAST IMPLANT PROSTHESIS N/A 12/30/2022    Procedure: Exploratory Laparotomy, lysis of adhesions, small bowel resection. Placement of gastric jejunostomy for enteral feeding.;  Surgeon: Elver Bauer MD;  [facility]    REMOVE GASTROSTOMY TUBE ADULT N/A 01/28/2022    Procedure: REMOVAL, GASTROSTOMY TUBE, ADULT;  Surgeon: Mandeep Park MD;  [facility]    REPLACE GASTROJEJUNOSTOMY TUBE, PERCUTANEOUS N/A 09/07/2021    Procedure: GASTROJEJUNOSTOMY TUBE PLACEMENT, PERCUTANEOUS, WITH GASTROPEXY;  Surgeon: Mandeep Park MD;  [facility]    REPLACE GASTROJEJUNOSTOMY TUBE, PERCUTANEOUS N/A 09/22/2021    Procedure: REPLACEMENT, GASTROJEJUNOSTOMY TUBE, PERCUTANEOUS;  Surgeon: Zackery Montoya MD;  [facility]    REPLACE GASTROJEJUNOSTOMY TUBE, PERCUTANEOUS N/A 11/22/2022    Procedure: REPLACEMENT, GASTROJEJUNOSTOMY TUBE, PERCUTANEOUS;  Surgeon: Mandeep Park MD;  [facility]    REPLACE GASTROJEJUNOSTOMY TUBE, PERCUTANEOUS N/A 12/09/2022    Procedure: REPLACEMENT, GASTROJEJUNOSTOMY TUBE, PERCUTANEOUS with ENDOSCOPIC SUTURING.;  Surgeon: Mandeep Park MD;  [facility]    REPLACE GASTROJEJUNOSTOMY TUBE, PERCUTANEOUS N/A 08/01/2023    Procedure: Replace Gastrojejunostomy Tube, Percutaneous;  Surgeon: Mandeep Park MD;  [facility]    REPLACE GASTROJEJUNOSTOMY TUBE, PERCUTANEOUS N/A 2023    Procedure: Replace Gastrojejunostomy Tube, Percutaneous;  Surgeon: Francisco Dodson MD;  [facility]    REPLACE GASTROJEJUNOSTOMY TUBE, PERCUTANEOUS N/A 2023    Procedure: Replace Gastrojejunostomy Tube, Percutaneous;  Surgeon: Mandeep Park MD;  [facility]    REPLACE JEJUNOSTOMY TUBE, PERCUTANEOUS N/A 09/10/2021    Procedure: UPPER ENDOSCOPY, REPLACEMENT OF PERCUTANEOUS GASTROJEJUNOSTOMY TUBE, T-TAG GASTROPEXY;  Surgeon: Zackery Montoya MD;  [facility]    REPLACE JEJUNOSTOMY TUBE, PERCUTANEOUS N/A 2021    Procedure: REPLACEMENT, JEJUNOSTOMY TUBE, PERCUTANEOUS;  Surgeon: Mandeep Park MD;  [facility]    REPLACE JEJUNOSTOMY TUBE, PERCUTANEOUS N/A 2023    Procedure: REPLACEMENT, JEJUNOSTOMY TUBE, PERCUTANEOUS;  Surgeon: Mandeep Park MD;  [facility]    REPLACE JEJUNOSTOMY TUBE, PERCUTANEOUS  2023    Procedure: Replace Jejunostomy Tube, Percutaneous;  Surgeon: Mandeep Park MD;  [facility]    REPLACE JEJUNOSTOMY TUBE, PERCUTANEOUS N/A 2024    Procedure: REPLACEMENT, JEJUNOSTOMY TUBE, PERCUTANEOUS;  Surgeon: Francisco Dodson MD;  [facility]    transphenoidal pituitary resection  1990    [facility]  DELIVERY ONLY  1996    [facility]  DELIVERY ONLY  1998    repeat c section with incidental cystotomy with repair    [facility] EXCIS PITUITARY,TRANSNASAL/SEPTAL  1980    pituitary tumor removed for diabetes insipidus    [facility] TOTAL ABDOM HYSTERECTOMY  1999    w/ bilateral salpingoophorectomy        Problem list:    Patient Active Problem List    Diagnosis Date Noted    Bacteremia 11/10/2023     Priority: Medium    Abdominal pain, unspecified abdominal location 2023     Priority: Medium    Cholangitis (H28) 2023     Priority: Medium    On total parenteral nutrition (TPN) 2023     Priority: Medium    Fever,  unspecified fever cause 06/04/2023     Priority: Medium    Chronic pancreatitis, unspecified pancreatitis type (H) 06/04/2023     Priority: Medium    History of food intolerance 05/09/2023     Priority: Medium    Malnutrition, unspecified type (H24) 05/09/2023     Priority: Medium    Nausea and vomiting, unspecified vomiting type 05/09/2023     Priority: Medium    Major depression, single episode 03/06/2023     Priority: Medium    Acute postoperative abdominal pain 01/31/2023     Priority: Medium    Feeding intolerance 12/30/2022     Priority: Medium    Myofascial pain 10/14/2022     Priority: Medium     Added automatically from request for surgery [id number]      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  pancreas and spleen removed, islet cells transplanted into liver    3 classes of vaccines needed .    1. Pneumococcal vaccines are as follows:       PCV 13 - one time dose (was given 2017)       PPSV23 - (given 12/1/16); repeat q 5 years      ROLE of PCV 20???    2. Meningococcal vaccines     Menactra - (last given 12/1/16) -  repeat q 5 yrs   NOTE: need to wait 4 wks after PCV 13 has been given.   OR - give Menveo instead as it can be given same time as PCV 13    AND   Bexcero - (got 12/9/16) - does not need to be repeated.     3. The Hib vaccine - (got 12/9/16) - does not need to be repeated.      Poisoning by drug 05/09/2022     Priority: Medium     Formatting of this note might be different from the original.  Per Care Everywhere review:  All oral, IV and injectable steroids are contraindicated (unless in life threatening situations) in Islet Auto transplant recipients. They can cause irreversible loss of islet cell function. Please contact patient's transplant care coordinator ADI Gaffney RN at [phone]/pager [phone] and/or endocrinologist prior to administration.      Type 1 diabetes mellitus without complication (H)  05/09/2022     Priority: Medium     Formatting of this note might be different from the original.  ACTUALLY - DM 3c - pathogenic diabetes as no pancreas.  (pancreas and spleen removed, islet cells transplanted into liver)    Seeing ENDO at [facility] - Dr Erum Melissa      Intra-abdominal abscess (H) 12/03/2021     Priority: Medium    Postprocedural intraabdominal abscess 12/03/2021     Priority: Medium    Gastrostomy tube obstruction (H) 11/27/2021     Priority: Medium    Abdominal pain, generalized 09/18/2021     Priority: Medium    Adult failure to thrive 08/16/2021     Priority: Medium     Added automatically from request for surgery [id number]      Hypoglycemia 08/05/2021     Priority: Medium    Iron deficiency anemia 03/22/2019     Priority: Medium    Headache, chronic migraine without aura 09/18/2018     Priority: Medium    Chronic pain syndrome 09/18/2018     Priority: Medium    S/P hernia repair 08/23/2018     Priority: Medium    Incisional hernia, without obstruction or gangrene 05/20/2018     Priority: Medium    Adhesive capsulitis of shoulder, unspecified laterality 11/14/2017     Priority: Medium    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  Excess IgG4  [facility]      Gastroparesis      Priority: Medium    ACP (advance care planning) 03/28/2017     Priority: Medium     Advance Care Planning 3/28/2017: Receipt of ACP document:  Received: Health Care Directive which was witnessed or notarized on 12/8/16.  Document previously scanned on 1/12/17.  Validation form completed and scanned.  Code Status reflects choices in most recent ACP document..  Confirmed/documented designated decision maker(s).  Added by May Baires   Advance Care Planning Liaison              Pancreatic insufficiency 01/17/2017     Priority: Medium     Diabetes insipidus (H24) 01/05/2017     Priority: Medium     Formatting of this note might be different from the original.  Diabetes Insipidus (DI)  Per external records.  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  Depression NOS      Other type of intractable migraine      Priority: Medium     Diagnosis updated by automated process. Provider to review and confirm.      GERD (gastroesophageal reflux disease) 12/01/2010     Priority: Medium    Lumbago 04/18/2005     Priority: Medium     History of L5-S1 degenerative disk disease.        Sensorineural hearing loss 01/10/2005     Priority: Medium     Problem list name updated by automated process. Provider to review      Vertiginous syndrome and labyrinthine disorder 01/10/2005     Priority: Medium     Problem list name updated by automated process. Provider to review  IMO Regulatory Load OCT 2020      Sprain and strain of other specified sites of hip and thigh 12/09/2002     Priority: Medium    Chondromalacia of patella 12/09/2002     Priority: Medium    Need for prophylactic immunotherapy      Priority: Medium     trees, grass, srw, dust mites, cat      Allergic rhinitis due to other allergen 12/21/2001     Priority: Medium    Hypothyroidism      Priority: Medium     Problem list name updated by automated process. Provider to review         Medications:  Current Outpatient Medications   Medication Sig Dispense Refill    acetaminophen (TYLENOL) 325 MG/10.15ML solution 20.3 mLs (650 mg) by Per J Tube route every 6 hours as needed for mild pain or fever 473 mL 4    baclofen (LIORESAL)  "10 MG tablet 1-2 tablets (10-20 mg) by Per G Tube route 3 times daily 60 tablet 1    buprenorphine (SUBUTEX) 2 MG SUBL sublingual tablet Place 2 mg under the tongue 2 times daily      cetirizine (ZYRTEC) 10 MG tablet 1 tablet (10 mg) by Per G Tube route 2 times daily 60 tablet 11    COMPOUNDED NON-CONTROLLED SUBSTANCE (CMPD RX) - PHARMACY TO MIX COMPOUNDED MEDICATION Administer 40 mg amitriptyline suspension (2 mg/mL) via G-J tube at bedtime. 600 mL 5    COMPOUNDED NON-CONTROLLED SUBSTANCE (CMPD RX) - PHARMACY TO MIX COMPOUNDED MEDICATION Place 1 enema rectally 2 times daily as needed (for constipation) 60 each 3    Continuous Blood Gluc Sensor (SekoiaSTYLE LISA 3 SENSOR) MISC CHANGE EVERY 14 DAYS 2 each 11    Digestive Enzyme Cartridge (RELIZORB) MARCO 2 Cartridges daily 60 each 6    diphenhydrAMINE (BENADRYL) 12.5 MG/5ML solution Take 25 mg by mouth 4 times daily as needed for other (nausea)      droNABinol (SYNDROS) 5 MG/ML oral soln Take 0.5 mLs (2.5 mg) by mouth 2 times daily 60 mL 0    DULoxetine HCl 60 MG CSDR 1 capsule (60 mg) by Per J Tube route daily Sprinkle caps for feeding tube 30 capsule 1    estradiol (VAGIFEM) 10 MCG TABS vaginal tablet INSERT 1 TABLET(10 MCG) VAGINALLY 2 TIMES A WEEK 24 tablet 2    famotidine (PEPCID) 40 MG/5ML suspension 2.5 mLs (20 mg) by Per J Tube route daily 50 mL 4    glucagon 1 MG kit Give 0.1 to 0.15mg( 10-15 units on Insulin sryinge) subcutaneous  every 15 minutes PRN for hypoglycemia. Remix new kit q24hr. Needs up to 3 kit/week. 10 each 3    glucose 40 % GEL gel Take 15-30 g by mouth every 15 minutes as needed for low blood sugar 3 Tube 2    insulin aspart (NOVOLOG PEN) 100 UNIT/ML pen Take 1 unit if BG is 175-275, and 2 units if >275 up to every 4 hours as needed.  \"Prime\" pen needle with 2 unit airshot before giving, needs supply for 15 unit/day. 15 mL 11    insulin detemir (LEVEMIR PEN) 100 UNIT/ML pen Take 7 to 10 units per day, adjust as directed by MD.  Do 2 unit " "'prime' before dosing, needs 12 units/day supply. 15 mL 5    insulin syringe-needle U-100 (30G X 1/2\" 0.3 ML) 30G X 1/2\" 0.3 ML miscellaneous Use 3 syringes daily or as directed. 100 each 11    lactated ringers infusion Inject 1,000 mLs into the vein daily as needed      levothyroxine (SYNTHROID) 25 mcg/mL SUSP 5.48 mLs (137 mcg) by Per J Tube route daily 500 mL 3    lidocaine (LIDODERM) 5 % patch Place onto the skin every 24 hours To prevent lidocaine toxicity, patient should be patch free for 12 hrs daily.      lipase-protease-amylase (CREON) 50529-36968-596363 units CPEP per EC capsule Take 3-4 capsules by mouth 3 times daily (with meals) With oral meals 150 capsule 11    methocarbamol (ROBAXIN) 750 MG tablet Take 1 tablet (750 mg) by mouth 4 times daily as needed for muscle spasms 120 tablet 11    MOTEGRITY 2 MG tablet TAKE ONE TABLET BY MOUTH ONCE DAILY 30 tablet 6    naloxegol (MOVANTIK) 12.5 MG TABS tablet Take 25 mg by mouth every morning (before breakfast)      Nutritional Supplements (BOOST HIGH PROTEIN) LIQD After above baseline labs are drawn, give: 6 mL/kg to maximum of 360 mL; the beverage is to be consumed within 5 minutes.  0    pantoprazole (PROTONIX) 40 mg IV push injection Inject 40 mg into the vein daily (with breakfast) 30 each 11    prochlorperazine (COMPAZINE) 10 MG tablet Take 1 tablet (10 mg) by mouth every 6 hours as needed for nausea or vomiting 120 tablet 3    promethazine 25 mg Inject 25 mg into the vein 3 times daily      rimegepant (NURTEC) 75 MG ODT tablet Place 1 tablet (75 mg) under the tongue daily as needed for migraine Maximum of 1 tablet every 24 hours. 8 tablet 11    scopolamine (TRANSDERM) 1 MG/3DAYS 72 hr patch Place 1 patch onto the skin every 72 hours - Transdermal 10 patch 11    sennosides (SENOKOT) 8.8 MG/5ML syrup 5 mLs by Per J Tube route 2 times daily 236 mL 11    Sharps Container (BD SHARPS ) MISC 1 Container as needed 1 each 1    silver nitrate (ARZOL SILVER " "NIT APPLICATORS) 75-25 % miscellaneous Apply topically as needed for wound care Apply Silver Nitrate Sticks every 2-3 days until granulation tissue resolved.  \"Roll\" tip of Silver Nitrate Q tip directly onto the granulation tissue-bulging tissue area.  Have Agency or Home Care Nurse administer this, if you prefer.  Take care not to touch any healthy tissue or skin.  The tissue will turn white and eventually black & scab off.  May repeat if needed in the future for recurrence. 30 applicator 0    sodium phosphate, mineral oil, magnesium citrate enema Instill 1 pink 187 ml enema twice daily as needed for constipation      STATIN NOT PRESCRIBED (INTENTIONAL) Previous liver issues, risks vs benefits felt to not warrant statin.    Discussed Oct 2022 visit      zinc oxide - white petrolatum (CRITIC-AID THICK MOIST BARRIER) 20-51% PSTE topical paste Apply 71 g topically every hour as needed for rash (Under J tube bumper when needed for skin protection) 170 g 0    ZOLMitriptan (ZOMIG-ZMT) 5 MG ODT Take 1 tablet (5 mg) by mouth at onset of headache for migraine Dissolve tablet in the mouth. May repeat in 2 hours. Max 2 tablets/24 hours. 12 tablet 6    COMPRO 25 MG suppository INSERT ONE SUPPOSITORY RECTALLY EVERY 12 HOURS AS NEEDED FOR NAUSEA 10 suppository 1    insulin aspart (NOVOLOG FLEXPEN) 100 UNIT/ML pen Take 1 unit if BG is 175-275, and 2 units if >275 up to every 4 hours as needed.  \"Prime\" pen needle with 2 unit airshot before giving, needs supply for 15 unit/day. 15 mL 11       Allergies:  Allergies   Allergen Reactions    Apple Juice Anaphylaxis    Corticosteroids Other (See Comments)     All oral, IV and injectable steroids are contraindicated (unless in life threatening situations) in [facility] transplant recipients. They can cause irreversible loss of islet cell function. Please contact patient's transplant care coordinator ADI Gaffney RN at [phone]/pager [phone] and/or endocrinologist prior to " administration.      Depakote [Divalproex Sodium] Other (See Comments)     Chest pain    Zithromax [Azithromycin Dihydrate] Anaphylaxis     Noted in 4/7/08 ER    Bromfenac      Other reaction(s): Headache    Codeine      Other reaction(s): Hallucinations    Darvocet [Propoxyphene N-Apap] Itching    Morphine Nausea and Vomiting and Rash    Nalbuphine Hcl Rash     RASH :nubaine    Zosyn [Piperacillin-Tazobactam In Dex] Rash     Possible allergy, did have a diffuse rash that seemed drug related but could have also been related to soap in the hospital.     Bactrim [Sulfamethoxazole W-Trimethoprim] Other (See Comments) and Nausea and Vomiting     Severely low liver function.    Statins Other (See Comments)     Previous liver issues, risks vs benefits felt to not warrant statin.  Discussed Oct 2022 visit    Tape [Adhesive Tape] Blisters    Tramadol     Zofran [Ondansetron] Other (See Comments)     migraine    Flagyl [Metronidazole] Hives and Rash       Family history:  Family History   Problem Relation Age of Onset    Lipids Mother     Hypertension Mother     Thyroid Disease Mother     Depression Mother     Angina Mother     GERD Mother     Skin Cancer Mother     Migraines Mother     Autoimmune Disease Mother     Hyperlipidemia Mother     Mental Illness Mother     Cerebrovascular Disease Mother         11/2023    Other Cancer Mother         skin-basil cell    Anxiety Disorder Mother     Eye Disorder Father         cataract, detached retina    Myocardial Infarction Father 60    Lipids Father     Cerebrovascular Disease Father     Depression Father     Substance Abuse Father     Anesthesia Reaction Father         stroke right after surgery    Cataracts Father     Osteoarthritis Father     Ulcerative Colitis Father     Autoimmune Disease Father     Heart Disease Father     Hyperlipidemia Father     Mental Illness Father     Other Cancer Father         myloma    Anxiety Disorder Father     Interpersonal Violence Sister      Coronary Artery Disease Sister         angioplasty    GERD Sister     Substance Abuse Sister     Cerebrovascular Disease Sister         2005    Depression Sister     Thyroid Disease Sister     Eye Disorder Maternal Grandmother         cataract    Thyroid Disease Maternal Grandmother     Diabetes Maternal Grandfather     Eye Disorder Paternal Grandmother         cataract    Diabetes Paternal Grandmother     Eye Disorder Paternal Grandfather         cataract    Diabetes Paternal Grandfather     Substance Abuse Paternal Grandfather     Eye Disorder Son         ptosis    Depression Son     Anxiety Disorder Son     Heart Disease Paternal Aunt     Diabetes Paternal Aunt     Diabetes Paternal Uncle     Heart Disease Paternal Uncle     Depression Nephew     Anxiety Disorder Nephew     Thyroid Disease Nephew     Diabetes Type 2  Cousin         paternal cousin    Autoimmune Disease Cousin     Autoimmune Disease Sister     Depression Sister     Mental Illness Sister     Substance Abuse Sister     Thyroid Disease Sister     Depression Son     Mental Illness Son     Anxiety Disorder Son     Thyroid Disease Nephew     Anxiety Disorder Nephew        Social history:  Social History     Socioeconomic History    Marital status:      Spouse name: Norris    Number of children: 3    Years of education: 16    Highest education level: Not on file   Occupational History    Occupation: director     Employer: [employer]   Tobacco Use    Smoking status: Former     Current packs/day: 0.00     Average packs/day: 0.5 packs/day for 6.3 years (3.2 ttl pk-yrs)     Types: Cigarettes     Start date: 1985     Quit date: 1992     Years since quittin.4    Smokeless tobacco: Not on file    Tobacco comments:     no 2nd hand   Vaping Use    Vaping status: Some Days    Substances: THC, CBD   Substance and Sexual Activity    Alcohol use: Not Currently     Alcohol/week: 3.0 - 6.0 standard drinks of alcohol     Types: 1 - 2 Glasses of wine, 1 -  2 Cans of beer, 1 - 2 Shots of liquor per week     Comment: none since IGG    Drug use: No     Comment: medical canabis tincture and vape    Sexual activity: Yes     Partners: Male     Birth control/protection: Male Surgical, Female Surgical, None     Comment: Hystectomy 1999   Other Topics Concern    Parent/sibling w/ CABG, MI or angioplasty before 65F 55M? No     Service Not Asked    Blood Transfusions No    Caffeine Concern Not Asked    Occupational Exposure Not Asked    Hobby Hazards Not Asked    Sleep Concern Not Asked    Stress Concern Not Asked    Weight Concern Not Asked    Special Diet Not Asked    Back Care Not Asked    Exercise Not Asked    Bike Helmet Not Asked    Seat Belt Yes    Self-Exams Not Asked   Social History Narrative     with 3 children and a dog.  No smoking, etoh or drug use.  Worked as a  for [employer] in [place] in the past.  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     Social Determinants of Health     Financial Resource Strain: Low Risk  (4/26/2024)    Financial Resource Strain     Within the past 12 months, have you or your family members you live with been unable to get utilities (heat, electricity) when it was really needed?: No   Food Insecurity: Low Risk  (4/26/2024)    Food Insecurity     Within the past 12 months, did you worry that your food would run out before you got money to buy more?: No     Within the past 12 months, did the food you bought just not last and you didn t have money to get more?: No   Transportation Needs: Low Risk  (4/26/2024)    Transportation Needs     Within the past 12 months, has lack of transportation kept you from medical appointments, getting your medicines, non-medical meetings or appointments, work, or from getting things that you need?: No   Physical Activity: Sufficiently Active (3/19/2024)    Exercise Vital Sign     Days of Exercise per Week: 4 days     Minutes of Exercise per  Session: 40 min   Stress: Stress Concern Present (3/19/2024)    [employer] [place] - Occupational Stress Questionnaire     Feeling of Stress : To some extent   Social Connections: Unknown (3/19/2024)    Social Connection and Isolation Panel [NHANES]     Frequency of Communication with Friends and Family: Not on file     Frequency of Social Gatherings with Friends and Family: Once a week     Attends Yarsani Services: Not on file     Active Member of Clubs or Organizations: Not on file     Attends Club or Organization Meetings: Not on file     Marital Status: Not on file   Interpersonal Safety: Low Risk  (3/20/2024)    Interpersonal Safety     Do you feel physically and emotionally safe where you currently live?: Yes     Within the past 12 months, have you been hit, slapped, kicked or otherwise physically hurt by someone?: No     Within the past 12 months, have you been humiliated or emotionally abused in other ways by your partner or ex-partner?: No   Housing Stability: Low Risk  (4/26/2024)    Housing Stability     Do you have housing? : Yes     Are you worried about losing your housing?: No         Nursing Notes:   Herbie Kasper, EMT  5/15/2024  5:48 PM  Signed  Chief Complaint   Patient presents with    Consult       Vitals:    05/15/24 1746   BP: 128/77   BP Location: Left arm   Patient Position: Sitting   Cuff Size: Adult Regular   Pulse: 87   SpO2: 97%       There is no height or weight on file to calculate BMI.    Herbie Kasper EMT-P\       Physical Examination:  /77 (BP Location: Left arm, Patient Position: Sitting, Cuff Size: Adult Regular)   Pulse 87   SpO2 97%   General: alert, oriented, in no acute distress  Abdomen: flat, well healed midline scar, G and J tubes present.

## 2024-06-09 NOTE — NURSING NOTE
Is the patient currently in the state of [place]? YES    Visit mode:VIDEO    If the visit is dropped, the patient can be reconnected by: VIDEO VISIT: Send to e-mail at: [email or web address]    Will anyone else be joining the visit? NO  (If patient encounters technical issues they should call [phone])    How would you like to obtain your AVS? MyChart    Are changes needed to the allergy or medication list? No      Reason for visit: Video Visit (Follow UP )    Sheyla FLETCHER     
show

## 2024-06-10 NOTE — PROGRESS NOTES
Dinora is a 59 year old who is being evaluated via a billable video visit.    How would you like to obtain your AVS? MyChart  If the video visit is dropped, the invitation should be resent by: Text to cell phone: [phone]  Will anyone else be joining your video visit? No      Are refills needed on medications prescribed by this physician? NO    Assessment & Plan     Pulmonary nodules    New order placed for follow-up as previous one is expiring.     - CT Chest w/o Contrast; Future    Situational depression    Mood has improved since last visit- she is not sure if that is medication related or due to situational factors as she is pursuing some other options for her GI symptoms and has some hope now and is also able to get outside to walk with improvement in allergies.  She is increasing her amitriptyline dose for GI symptoms; discussed potential interaction with duloxetine and increased serotonin levels.  We'll continue the current duloxetine dose at this time.  Follow-up as needed.     - DULoxetine HCl 60 MG CSDR; 1 capsule (60 mg) by Per J Tube route daily Sprinkle caps for feeding tube          Subjective   Dinora is a 59 year old, presenting for the following health issues:  RECHECK and  Follow Up    History of Present Illness       Mental Health Follow-up:  Patient presents to follow-up on Depression.Patient's depression since last visit has been:  Better  The patient is not having other symptoms associated with depression.      Any significant life events: grief or loss and health concerns  Patient is not feeling anxious or having panic attacks.  Patient has no concerns about alcohol or drug use.    She eats 0-1 servings of fruits and vegetables daily.She consumes 0 sweetened beverage(s) daily.She exercises with enough effort to increase her heart rate 10 to 19 minutes per day.  She exercises with enough effort to increase her heart rate 4 days per week.   She is taking medications regularly.       I saw  "Dinora last on  for depression which was not improved on 30mg of duloxetine, so we increased to 60mg.  We also increased Zyrtec from daily to BID for uncontrolled allergy symptoms.    Per MTM note on  regarding the duloxetine: \"feels like these may be starting to work. She is sleeping better. Notes stomach pain has subsided and no longer having diarrhea\"    She saw GI on  and they discussed increasing amitriptyline from 40mg to 50mg for abdominal pain.     She continues to follow with therapy.     Today, Dinora reports that the depression is doing better.  She is getting outside more, which has been helpful.  She is getting a second opinion at [facility] for her stomach issues.  She is thinking about seeing a functional medicine doctor for additional recommendations and is feeling a little bit of hope.   She is avoiding mild recently and that is helping a little with GI symptoms.  She is taking a break from Motegrity and actually had some spontaneous BMs, though still feeling backed up and distended.  She has increased amitriptyline to 50mg.    Allergies have improved, still taking medication but she can breath when she goes out side and eye are no longer swollen.      She needs a new order for the follow-up CT for lung nodules since the previous order is .          3/19/2024     8:41 AM 2024     2:54 PM 2024     9:51 AM   PHQ   PHQ-9 Total Score 17 9 8   Q9: Thoughts of better off dead/self-harm past 2 weeks Several days Not at all Not at all   F/U: Thoughts of suicide or self-harm No     F/U: Safety concerns No             Constitutional, GI and psych systems are negative, except as otherwise noted.       Objective    Vitals - Patient Reported  Systolic (Patient Reported): 89  Diastolic (Patient Reported): 50  Weight (Patient Reported): 56.7 kg (125 lb)  Pain Score: Severe Pain (6)  Pain Loc: Abdomen      Vitals:  No vitals were obtained today due to virtual visit.    Physical Exam "   GENERAL: alert and no distress  EYES: Eyes grossly normal to inspection.  No discharge or erythema, or obvious scleral/conjunctival abnormalities.  RESP: No audible wheeze, cough, or visible cyanosis.    SKIN: Visible skin clear. No significant rash, abnormal pigmentation or lesions.  NEURO: Cranial nerves grossly intact.  Mentation and speech appropriate for age.  PSYCH: Appropriate affect, tone, and pace of words          Video-Visit Details    Type of service:  Video Visit   Start time: 958am  End time: 1009am  Originating Location (pt. Location): Home    Distant Location (provider location):  Off-site  Platform used for Video Visit: Emeli  Signed Electronically by: Juan Jose Gomez MD

## 2024-06-11 NOTE — TELEPHONE ENCOUNTER
PA Initiation    Medication: MOTEGRITY 2 MG PO TABS  Insurance Company: [employer] [place] - Phone [phone] Fax [phone]  Pharmacy Filling the Rx: [facility] - [place], [place] - [address]  Filling Pharmacy Phone:    Filling Pharmacy Fax:    Start Date: 6/11/2024

## 2024-06-11 NOTE — TELEPHONE ENCOUNTER
Prior Authorization Approval    Medication: MOTEGRITY 2 MG PO TABS  Authorization Effective Date: 5/12/2024  Authorization Expiration Date: 6/11/2025  Approved Dose/Quantity: 30/30  Reference #: [id number]   Insurance Company: [employer] [place] - Phone [phone] Fax [phone]  Expected CoPay: $    CoPay Card Available:      Financial Assistance Needed:   Which Pharmacy is filling the prescription: [facility] - [place] [address]  Pharmacy Notified: Yes  Patient Notified: Yes

## 2024-06-12 ENCOUNTER — PATIENT OUTREACH (OUTPATIENT)
Dept: GASTROENTEROLOGY | Facility: CLINIC | Age: 59
End: 2024-06-12
Payer: COMMERCIAL

## 2024-06-12 NOTE — PROGRESS NOTES
Call placed to Dinora to discuss follow up appointment.   Discussed an appointment on 9-25 with Dr. Genao.   Message sent to clinic coordinators to schedule     She would like a referral to [facility]. Will update Dr. Genao     She has stopped the motegrity, changed her tube feeding formula to [employer] and increased the amitriptyline.  With these changes she has had a spontaneous stool over the last 2 days.  She is feeling hopeful with these changes      Will update Dr. Genao

## 2024-06-13 ENCOUNTER — VIRTUAL VISIT (OUTPATIENT)
Dept: INTERNAL MEDICINE | Facility: CLINIC | Age: 59
End: 2024-06-13
Payer: COMMERCIAL

## 2024-06-13 DIAGNOSIS — F43.21 SITUATIONAL DEPRESSION: ICD-10-CM

## 2024-06-13 DIAGNOSIS — R91.8 PULMONARY NODULES: Primary | ICD-10-CM

## 2024-06-13 PROCEDURE — 99213 OFFICE O/P EST LOW 20 MIN: CPT | Mod: 95 | Performed by: INTERNAL MEDICINE

## 2024-06-13 ASSESSMENT — PATIENT HEALTH QUESTIONNAIRE - PHQ9: SUM OF ALL RESPONSES TO PHQ QUESTIONS 1-9: 8

## 2024-06-13 NOTE — PATIENT INSTRUCTIONS
Thank you for visiting the Primary Care Center today at the [facility]! The following is some information about our clinic:     Primary Care Center Frequently-Asked Questions    (1) How do I schedule appointments at the [facility]?     Primary Care--to schedule or make changes to an existing appointment, please call our primary care line at [phone].    Labs--to schedule a lab appointment at the [facility] you can use b-datum or call [phone]. If you have a [place] location that is closer to home, you can reach out to that location for scheduling options.     Imaging--if you need to schedule a CT, X-ray, MRI, ultrasound, or other imaging study you can call [phone] to schedule at the [facility] or any other [facility] imaging location.     Referrals--if a referral to another specialty was ordered you can expect a phone call from their scheduling team. If you have not heard from them in a week, please call us or send us a b-datum message to check the status or get a scheduling number. Please note that this only applies to internal [facility] referrals. If the referral is external you would need to contact their office for scheduling.     (2) I have a question about my visit, who do I contact?     You can call us at the primary care line at [phone] to ask questions about your visit. You can also send a secure message through b-datum, which is reviewed by clinic staff. Please note that b-datum messages have a twenty-four to forty-eight business hour turnaround time and should not be used for urgent concerns.    (3) How will I get the results of my tests?    If you are signed up for [employer] all tests will be released to you within twenty-four hours of resulting. Please allow three to five days for your doctor to review your results and place a note interpreting the results. If you do not have [employer] you will receive your  results through mail seven to ten business days following the return of the tests. Please note that if there should be any urgent or concerning results that your doctor or their registered nurse will reach out to you the same day as the tests come back. If you have follow up questions about your results or would like to discuss the results in detail please schedule a follow up with your provider either in person or virtually.     (4) How do I get refills of my prescriptions?     You should always first contact your pharmacy for refills of your medications. If submitting a refill request on [employer], please be sure to submit the request only once--repeat requests can cause delays in refill. If you are requesting a NEW medication or a medication related to new symptoms you will need to schedule an appointment with a provider prior to approval. Please note: Routine medication refills have up to one to three business day turnaround whereas controlled substances refills have up to five to seven business day turnaround.    (5) I have new symptoms, what do I do?     If you are having an immediate medical emergency, you should dial 911 for assistance.   For anything urgent that needs to be seen within a few hours to one day you should visit a local urgent care for assistance.  For non-urgent symptoms that need to be seen within a few days to a week you can schedule with an available provider in primary care by going to [employer] or calling [phone].   If you are not sure how serious your symptoms are or you would like to receive medical advice you can always call [phone] to speak with a triage nurse.

## 2024-06-18 ENCOUNTER — PATIENT OUTREACH (OUTPATIENT)
Dept: GASTROENTEROLOGY | Facility: CLINIC | Age: 59
End: 2024-06-18
Payer: COMMERCIAL

## 2024-06-18 DIAGNOSIS — R10.84 ABDOMINAL PAIN, GENERALIZED: Primary | ICD-10-CM

## 2024-06-18 NOTE — PROGRESS NOTES
Received a voicemail from Dinora and she increased the amitriptyline to 60 mg at night and she is getting better sleep.   Needs new placed and sent to the compounding pharmacy

## 2024-06-19 ENCOUNTER — PATIENT OUTREACH (OUTPATIENT)
Dept: GASTROENTEROLOGY | Facility: CLINIC | Age: 59
End: 2024-06-19
Payer: COMMERCIAL

## 2024-06-19 DIAGNOSIS — K31.84 GASTROPARESIS: Primary | ICD-10-CM

## 2024-06-19 DIAGNOSIS — Z93.4 SMALL BOWEL TUBE FEEDING (H): ICD-10-CM

## 2024-06-19 DIAGNOSIS — K59.01 SLOW TRANSIT CONSTIPATION: ICD-10-CM

## 2024-06-19 NOTE — PROGRESS NOTES
Spoke with [facility] referral line and referral needs to be faxed in     Information needed  Demographics  Referral   Clinic note     Referral order placed   All information faxed to [phone]

## 2024-06-26 ENCOUNTER — TRANSFERRED RECORDS (OUTPATIENT)
Dept: HEALTH INFORMATION MANAGEMENT | Facility: CLINIC | Age: 59
End: 2024-06-26
Payer: COMMERCIAL

## 2024-06-27 ENCOUNTER — TELEPHONE (OUTPATIENT)
Dept: GASTROENTEROLOGY | Facility: CLINIC | Age: 59
End: 2024-06-27
Payer: COMMERCIAL

## 2024-07-01 ENCOUNTER — MYC REFILL (OUTPATIENT)
Dept: GASTROENTEROLOGY | Facility: CLINIC | Age: 59
End: 2024-07-01
Payer: COMMERCIAL

## 2024-07-01 ENCOUNTER — PATIENT OUTREACH (OUTPATIENT)
Dept: CARE COORDINATION | Facility: CLINIC | Age: 59
End: 2024-07-01
Payer: COMMERCIAL

## 2024-07-01 ENCOUNTER — MYC MEDICAL ADVICE (OUTPATIENT)
Dept: CARE COORDINATION | Facility: CLINIC | Age: 59
End: 2024-07-01
Payer: COMMERCIAL

## 2024-07-01 ENCOUNTER — MYC REFILL (OUTPATIENT)
Dept: INTERNAL MEDICINE | Facility: CLINIC | Age: 59
End: 2024-07-01
Payer: COMMERCIAL

## 2024-07-01 ENCOUNTER — PATIENT OUTREACH (OUTPATIENT)
Dept: GASTROENTEROLOGY | Facility: CLINIC | Age: 59
End: 2024-07-01
Payer: COMMERCIAL

## 2024-07-01 DIAGNOSIS — Z87.19 HISTORY OF FOOD INTOLERANCE: ICD-10-CM

## 2024-07-01 DIAGNOSIS — E46 MALNUTRITION, UNSPECIFIED TYPE (H): ICD-10-CM

## 2024-07-01 DIAGNOSIS — K31.84 GASTROPARESIS: ICD-10-CM

## 2024-07-01 DIAGNOSIS — R62.7 ADULT FAILURE TO THRIVE: ICD-10-CM

## 2024-07-01 RX ORDER — FAMOTIDINE 40 MG/5ML
20 POWDER, FOR SUSPENSION ORAL DAILY
Qty: 50 ML | Refills: 4 | Status: SHIPPED | OUTPATIENT
Start: 2024-07-01

## 2024-07-01 NOTE — TELEPHONE ENCOUNTER
Patient left message: Per patient, losing weight 118-121lbs, ongoing weight loss.   18hrs  day feeds, Amymunira Glass TF, reduced bloating but still feeling ill. Daily vomiting, not going great. Wondering if she should go back on TPN.\Message sent to Dr. Park   (Patient has hx of central line infections, most recent port placed 4/15/24)     Message sent to Dr. Xavier Victor, RN Care Coordinator

## 2024-07-01 NOTE — PROGRESS NOTES
Clinic Care Coordination Contact    Situation: Patient chart reviewed by care coordinator.    Background: Patient enrolled in Care Coordination.    Assessment: Qlusterst message sent to patient for follow up.     Plan/Recommendations: RN will await pt's reply for further outreach.    DARIELA ADAMS RN on 7/1/2024 at 4:06 PM    Pt sent Net [phone] message requesting to connect with his other CC with GI- RN has been included on messages and informed patient of this. RN will check in again with patient in 1 month.     DARIELA ADAMS RN on 7/9/2024 at 3:17 PM

## 2024-07-02 ENCOUNTER — DOCUMENTATION ONLY (OUTPATIENT)
Dept: INTERNAL MEDICINE | Facility: CLINIC | Age: 59
End: 2024-07-02
Payer: COMMERCIAL

## 2024-07-02 NOTE — PROGRESS NOTES
Type of Form Received: [facility] Care Order 1348    Form Received (Date) 7/2/24   Form Filled out Yes, faxed 7/5   Placed in provider folder Yes

## 2024-07-02 NOTE — TELEPHONE ENCOUNTER
droNABinol (SYNDROS) 5 MG/ML oral soln 60 mL 0 4/30/2024 -- No   Sig - Route: Take 0.5 mLs (2.5 mg) by mouth 2 times daily - Oral     ----------------------  Last Office Visit : 6/13/2024  [facility] [place]  Future Office visit:  0  ----------------------      Routing refill request to provider for review/approval because:  Medication not on protocol.

## 2024-07-02 NOTE — TELEPHONE ENCOUNTER
Called patient to discuss coordination of TPN and TF between GI and PCP. Patient has visit scheduled at end of August with [facility]. Agreed I will call her back with plan once MD's discuss/authorize TPN    ML

## 2024-07-05 DIAGNOSIS — Z53.9 DIAGNOSIS NOT YET DEFINED: Primary | ICD-10-CM

## 2024-07-09 DIAGNOSIS — K59.01 SLOW TRANSIT CONSTIPATION: ICD-10-CM

## 2024-07-09 DIAGNOSIS — R63.4 WEIGHT LOSS: ICD-10-CM

## 2024-07-09 DIAGNOSIS — K31.84 GASTROPARESIS: Primary | ICD-10-CM

## 2024-07-10 NOTE — PROGRESS NOTES
Curtis Farias is a 58 year old, presenting for the following health issues:  RECHECK (Return headache)  Initial headache clinic visit in August 2022, see note for details  Hx s/p post pancreas Tx TPIAT in 2016, gastroparesis, G and J tubes   S/p pituitary adenoma and transnasal resection at the age of 26  Due to complex medical history and poor absorption it was recommended a trial of Botox for migraine prevention.  Patient started Botox in September 2022.  Since starting Botox headaches decreased.   Botox is still effective and helps with headache prevention. Does have wearing off phenomena -wakes up with right sided headache in the last 4 weeks. Can be wearing off or due to hypoglycemia per patient.   Nurtec tried 4 times and worked last time in addition with zolmitriptan and no side effects reported either to zolmitriptan or Nurtec.  Not a lot of headaches in 11 weeks and did not vomit   Activity helps with headaches   Heat and ice helmet =both help  In the last 3 weeks -needs to use rescue treatment.  Reports that she is in a sweet spot before Botox wears off-we discussed headache prevention with trying Nurtec every other day and almost 4 weeks before Botox injections.  The goal is to reduce headache frequency prior to Botox reinjection.   No other headache concerns reported today  Patient satisfied with her current headache prevention    Impression  Chronic migraines controlled with Botox for prevention and rescue as needed zolmitriptan and Nurtec.      Plan:  Continue with Botox   Nurtec as needed and to consider every other day in the last 4 weeks before re injections or monthly for migraine prevention   Rescue treatment -zolmitriptan as needed   Follow up as scheduled for Botox reinjection    Preferred injectable or dissolvable medications due to poor got absorption.     Data reviewed  MR BRAIN W/O & W CONTRAST 8/29/2022 1:15 PM     Provided History: Positional headache; S/P pancreatic islet  cell  transplantation (H); History of pituitary surgery.     ICD-10: Positional headache; S/P pancreatic islet cell transplantation  (H); History of pituitary surgery     Comparison: 1/19/2005.     Technique: Multiplanar T1-weighted, axial FLAIR, and susceptibility  images were obtained without intravenous contrast. Following  intravenous gadolinium-based contrast administration, axial  T2-weighted, diffusion, and T1-weighted images (in multiple planes)  were obtained.     Contrast dose: 6.5 mL Gadavist     Findings:  There is no mass effect, midline shift, or evidence of intracranial  hemorrhage. The ventricles are proportionate to the cerebral sulci.     Postcontrast images demonstrate no abnormal intracranial enhancing  lesions.  Partially empty sella. Residual adenoma with subchondral  No definite abnormality of the skull marrow signal is noted. The major  vascular intracranial flow-voids are present. The visualized portions  of paranasal sinuses, and mastoid air cells are relatively clear. The  orbits are grossly unremarkable.                                                                      Impression: No evidence of abnormal enhancing lesions intracranially.  No finding to explain patient's symptoms. Essentially a normal brain  MRI.             Objective    /83 (BP Location: Right arm, Patient Position: Sitting, Cuff Size: Adult Regular)   Pulse 111   Wt 58.4 kg (128 lb 12.8 oz)   SpO2 97%   BMI 19.58 kg/m    Body mass index is 19.58 kg/m .  Physical Exam   GENERAL: alert and no distress  EYES: Eyes grossly normal to inspection, PERRL and conjunctivae and sclerae normal  MS: no gross musculoskeletal defects noted, no edema  NEURO: Normal strength and tone, mentation intact and speech normal  PSYCH: mentation appears normal, affect normal    The longitudinal plan of care for chronic migraine was addressed during this visit. Due to the added complexity in care, I will continue to support Dinora GAINES  Taz in the subsequent management of this condition and with the ongoing continuity of care of this condition.    28 minutes spent on the date of the encounter doing face-to-face visit, chart  review, exam, results review,  meds review, treatment plan, documentation and further activities as noted above    NATALY Andrew, CNP [facility]  Headache certified  [facility]     yes

## 2024-07-17 ENCOUNTER — PATIENT OUTREACH (OUTPATIENT)
Dept: GASTROENTEROLOGY | Facility: CLINIC | Age: 59
End: 2024-07-17
Payer: COMMERCIAL

## 2024-07-17 NOTE — TELEPHONE ENCOUNTER
Patient reporting pain at Gtube site, no redness at site, no new drainage.. No fever, no swelling, no granulation tissue. No bulging to indicate balloon migration (this has happened before)  Sharp pain for about 3 days- tube works fine, is just making her miserable (not able to sleep last night because of the pain)    Message sent to Dr. Park regarding next steps. Last exchanged by Dr Dodson April 2024    ML

## 2024-07-17 NOTE — TELEPHONE ENCOUNTER
Per     Try to exchanged, can be done at home  [facility] [place]   18 Fr Avanos KISHORE gastrostomy tube was lubricated and advanced across the gastrostomy tract. The internal balloon was inflated with 7 mL of saline.    [place] [id number], [place] [address] [phone]  Fax : [phone]    Orders faxed    ML

## 2024-07-19 DIAGNOSIS — R10.84 ABDOMINAL PAIN, GENERALIZED: ICD-10-CM

## 2024-07-19 DIAGNOSIS — K31.84 GASTROPARESIS: ICD-10-CM

## 2024-07-22 ENCOUNTER — VIRTUAL VISIT (OUTPATIENT)
Dept: ONCOLOGY | Facility: CLINIC | Age: 59
End: 2024-07-22
Attending: SOCIAL WORKER
Payer: COMMERCIAL

## 2024-07-22 VITALS
DIASTOLIC BLOOD PRESSURE: 80 MMHG | HEIGHT: 68 IN | SYSTOLIC BLOOD PRESSURE: 110 MMHG | WEIGHT: 125 LBS | BODY MASS INDEX: 18.94 KG/M2

## 2024-07-22 DIAGNOSIS — F43.23 ADJUSTMENT DISORDER WITH MIXED ANXIETY AND DEPRESSED MOOD: Primary | ICD-10-CM

## 2024-07-22 PROCEDURE — 90834 PSYTX W PT 45 MINUTES: CPT | Mod: 95 | Performed by: SOCIAL WORKER

## 2024-07-22 ASSESSMENT — PAIN SCALES - GENERAL: PAINLEVEL: EXTREME PAIN (8)

## 2024-07-22 NOTE — LETTER
7/22/2024      Dinora Mcghee  [address] [facility] [address]      Dear Colleague,    Thank you for referring your patient, Dinora Mcghee, to the [facility]. Please see a copy of my visit note below.    Virtual Visit Details    Type of service:  Video Visit     Originating Location (pt. Location): Home    Distant Location (provider location):  Off-site  Platform used for Video Visit: [facility]          Palliative Care Clinical Social Work Return Appointment    PLEASE NOTE:  THIS IS A MENTAL HEALTH NOTE.  OTHER PROVIDERS VIEWING THIS NOTE SHOULD USE THIS ONLY FOR UNDERSTANDING THE CONTEXT OF THE PATIENT'S EXPERIENCE.  TOPICS DESCRIBED IN THIS NOTE SHOULD NOT BE REFERENCED TO THE PATIENT BY MEDICAL PROVIDERS.    Dinora Mgchee is a 58  year old woman with multiple medical conditions including chronic pancreatitis s/p TPAIT, long term complications from gastroparesis, constipation, GERD, migraines.  Had GJ placed Sept 2021, recovery was complicated and she ended up with a venting g-tube.  Due to complications with PO tolerance she had surgical placement of a J tube, and a resection of densely adhered small bowel.  Started on tube feedings, then on TPN.  TPN eventually stopped due to infection.  Has heavy symptom burden that includes chronic nausea and vomiting, fatigue, complex medical management.  Has had multiple ER visits for GI and Bowel concerns.  Latest medical update was recommendation for full resection of her colon.  Going to [facility] for a second opinion.  Seen today for counseling for adjustment disorder with depressed mood and anxiety.          Mental Status Exam:(List all that apply)      Appearance: Appropriate      Eye Contact: Good       Orientation: Yes, x4      Mood: Normal       Affect: Appropriate       Thought Content: Clear         Thought Form: Logical      Psychomotor Behavior: Normal    Visit themes:  grief, adjustment to illness, resilience  "    Adjustment to Illness:  Received recommendation from her medical team for a full resection of her colon in hopes that this will help alleviate symptom burden. SHe is going for a second opinion at the [facility] next week.  Hoping that they will be able to offer an alternative treatment, but also preparing to be told that they agree with the recommendations of the GI team here \"I just need to know I tried\".  Having pain at her Gtube site with minimal movement. Back on TPN, reports she has been able to gain some weight.       Mental Health (thoughts, feelings, actions, coping, and symptoms):  Mood feeling \"pretty good\" in recent weeks.  Attributes this to better weather, also wonders if her anti depressant is helping.  Spending meaningful time with friends and family when able.  Looking forward to 2 family weddings this fall and another one next week.  Finds meaning and comfort in watching her children thrive.  Less demoralized today.             Helpful activities: time with family and friends, knitting, has been going on golf outings with friends -- does not golf, but is able to ride in the cart and socialize, has been working on [employer].         Helpful cognitions:     Unhelpful and/or triggering or exacerbating factors:     Body-Mind Skills Education: uses tapping and EMDR     Relationships:  and children, extended family    End of Life and Advance Care Planning:     Main therapeutic interventions provided this session include:     Provide psychoeducation, Facilitate processing of thoughts and feelings, and Provide advance care planning education    Plan:  Will return for psychotherapy with palliative care focus in 1 month.  Dinora also asked that I send a new HCD and POLST form as she wants to make sure her wishes for DNR/DNI are clear.         Time spent with patient/family:    52 minutes  (Start 1:00 end 1:52)    JAIDA Nguyen, [facility]   Palliative Care Clinical Social " Worker  Ph: [phone]      DO NOT SEND ANY LETTERS                Again, thank you for allowing me to participate in the care of your patient.        Sincerely,        CASS NguyenSW

## 2024-07-22 NOTE — PROGRESS NOTES
Virtual Visit Details    Type of service:  Video Visit     Originating Location (pt. Location): Home    Distant Location (provider location):  Off-site  Platform used for Video Visit: [facility]          Palliative Care Clinical Social Work Return Appointment    PLEASE NOTE:  THIS IS A MENTAL HEALTH NOTE.  OTHER PROVIDERS VIEWING THIS NOTE SHOULD USE THIS ONLY FOR UNDERSTANDING THE CONTEXT OF THE PATIENT'S EXPERIENCE.  TOPICS DESCRIBED IN THIS NOTE SHOULD NOT BE REFERENCED TO THE PATIENT BY MEDICAL PROVIDERS.    Dinora Mcghee is a 58  year old woman with multiple medical conditions including chronic pancreatitis s/p TPAIT, long term complications from gastroparesis, constipation, GERD, migraines.  Had GJ placed Sept 2021, recovery was complicated and she ended up with a venting g-tube.  Due to complications with PO tolerance she had surgical placement of a J tube, and a resection of densely adhered small bowel.  Started on tube feedings, then on TPN.  TPN eventually stopped due to infection.  Has heavy symptom burden that includes chronic nausea and vomiting, fatigue, complex medical management.  Has had multiple ER visits for GI and Bowel concerns.  Latest medical update was recommendation for full resection of her colon.  Going to [facility] for a second opinion.  Seen today for counseling for adjustment disorder with depressed mood and anxiety.          Mental Status Exam:(List all that apply)      Appearance: Appropriate      Eye Contact: Good       Orientation: Yes, x4      Mood: Normal       Affect: Appropriate       Thought Content: Clear         Thought Form: Logical      Psychomotor Behavior: Normal    Visit themes:  grief, adjustment to illness, resilience     Adjustment to Illness:  Received recommendation from her medical team for a full resection of her colon in hopes that this will help alleviate symptom burden. SHe is going for a second opinion at the [facility] next week.  Hoping that they  "will be able to offer an alternative treatment, but also preparing to be told that they agree with the recommendations of the GI team here \"I just need to know I tried\".  Having pain at her Gtube site with minimal movement. Back on TPN, reports she has been able to gain some weight.       Mental Health (thoughts, feelings, actions, coping, and symptoms):  Mood feeling \"pretty good\" in recent weeks.  Attributes this to better weather, also wonders if her anti depressant is helping.  Spending meaningful time with friends and family when able.  Looking forward to 2 family weddings this fall and another one next week.  Finds meaning and comfort in watching her children thrive.  Less demoralized today.             Helpful activities: time with family and friends, knitting, has been going on golf outings with friends -- does not golf, but is able to ride in the cart and socialize, has been working on [employer].         Helpful cognitions:     Unhelpful and/or triggering or exacerbating factors:     Body-Mind Skills Education: uses tapping and EMDR     Relationships:  and children, extended family    End of Life and Advance Care Planning:     Main therapeutic interventions provided this session include:     Provide psychoeducation, Facilitate processing of thoughts and feelings, and Provide advance care planning education    Plan:  Will return for psychotherapy with palliative care focus in 1 month.  Dinora also asked that I send a new HCD and POLST form as she wants to make sure her wishes for DNR/DNI are clear.         Time spent with patient/family:    52 minutes  (Start 1:00 end 1:52)    JAIDA Nguyen, [facility]   Palliative Care Clinical   Ph: [phone]      DO NOT SEND ANY LETTERS              "

## 2024-07-22 NOTE — NURSING NOTE
Is the patient currently in the state of [place]? YES    Visit mode:VIDEO    If the visit is dropped, the patient can be reconnected by: VIDEO VISIT: Send to e-mail at: [email or web address]    Will anyone else be joining the visit? NO  (If patient encounters technical issues they should call [phone]    How would you like to obtain your AVS? MyChart    Are changes needed to the allergy or medication list? No  Reason for visit: Video Visit (Follow Up )    Sheyla FLETCHER

## 2024-07-22 NOTE — LETTER
7/22/2024      Dinora Mcghee  [address] [facility] [address]      Dear Colleague,    Thank you for referring your patient, Dinora Mcghee, to the [facility]. Please see a copy of my visit note below.    Virtual Visit Details    Type of service:  Video Visit     Originating Location (pt. Location): Home    Distant Location (provider location):  Off-site  Platform used for Video Visit: [facility]          Palliative Care Clinical Social Work Return Appointment    PLEASE NOTE:  THIS IS A MENTAL HEALTH NOTE.  OTHER PROVIDERS VIEWING THIS NOTE SHOULD USE THIS ONLY FOR UNDERSTANDING THE CONTEXT OF THE PATIENT'S EXPERIENCE.  TOPICS DESCRIBED IN THIS NOTE SHOULD NOT BE REFERENCED TO THE PATIENT BY MEDICAL PROVIDERS.    Dinora Mcghee is a 58  year old woman with multiple medical conditions including chronic pancreatitis s/p TPAIT, long term complications from gastroparesis, constipation, GERD, migraines.  Had GJ placed Sept 2021, recovery was complicated and she ended up with a venting g-tube.  Due to complications with PO tolerance she had surgical placement of a J tube, and a resection of densely adhered small bowel.  Started on tube feedings, then on TPN.  TPN eventually stopped due to infection.  Has heavy symptom burden that includes chronic nausea and vomiting, fatigue, complex medical management.  Has had multiple ER visits for GI and Bowel concerns.  Latest medical update was recommendation for full resection of her colon.  Going to [facility] for a second opinion.  Seen today for counseling for adjustment disorder with depressed mood and anxiety.          Mental Status Exam:(List all that apply)      Appearance: Appropriate      Eye Contact: Good       Orientation: Yes, x4      Mood: Normal       Affect: Appropriate       Thought Content: Clear         Thought Form: Logical      Psychomotor Behavior: Normal    Visit themes:  grief, adjustment to illness, resilience  "    Adjustment to Illness:  Received recommendation from her medical team for a full resection of her colon in hopes that this will help alleviate symptom burden. SHe is going for a second opinion at the [facility] next week.  Hoping that they will be able to offer an alternative treatment, but also preparing to be told that they agree with the recommendations of the GI team here \"I just need to know I tried\".  Having pain at her Gtube site with minimal movement. Back on TPN, reports she has been able to gain some weight.       Mental Health (thoughts, feelings, actions, coping, and symptoms):  Mood feeling \"pretty good\" in recent weeks.  Attributes this to better weather, also wonders if her anti depressant is helping.  Spending meaningful time with friends and family when able.  Looking forward to 2 family weddings this fall and another one next week.  Finds meaning and comfort in watching her children thrive.  Less demoralized today.             Helpful activities: time with family and friends, knitting, has been going on golf outings with friends -- does not golf, but is able to ride in the cart and socialize, has been working on [employer].         Helpful cognitions:     Unhelpful and/or triggering or exacerbating factors:     Body-Mind Skills Education: uses tapping and EMDR     Relationships:  and children, extended family    End of Life and Advance Care Planning:     Main therapeutic interventions provided this session include:     Provide psychoeducation, Facilitate processing of thoughts and feelings, and Provide advance care planning education    Plan:  Will return for psychotherapy with palliative care focus in 1 month.  Dinora also asked that I send a new HCD and POLST form as she wants to make sure her wishes for DNR/DNI are clear.         Time spent with patient/family:    52 minutes  (Start 1:00 end 1:52)    JAIDA Nguyen, [facility]   Palliative Care Clinical Social " Worker  Ph: [phone]      DO NOT SEND ANY LETTERS                Again, thank you for allowing me to participate in the care of your patient.        Sincerely,        CASS NguyenSW

## 2024-07-23 ENCOUNTER — MYC MEDICAL ADVICE (OUTPATIENT)
Dept: GASTROENTEROLOGY | Facility: CLINIC | Age: 59
End: 2024-07-23
Payer: COMMERCIAL

## 2024-07-23 ENCOUNTER — HOSPITAL ENCOUNTER (INPATIENT)
Facility: CLINIC | Age: 59
LOS: 2 days | Discharge: HOME | End: 2024-07-25
Attending: EMERGENCY MEDICINE | Admitting: INTERNAL MEDICINE
Payer: COMMERCIAL

## 2024-07-23 ENCOUNTER — PATIENT OUTREACH (OUTPATIENT)
Dept: GASTROENTEROLOGY | Facility: CLINIC | Age: 59
End: 2024-07-23
Payer: COMMERCIAL

## 2024-07-23 ENCOUNTER — APPOINTMENT (OUTPATIENT)
Dept: CT IMAGING | Facility: CLINIC | Age: 59
End: 2024-07-23
Payer: COMMERCIAL

## 2024-07-23 DIAGNOSIS — E46 MALNUTRITION, UNSPECIFIED TYPE (H): Primary | ICD-10-CM

## 2024-07-23 DIAGNOSIS — T85.848A PAIN DUE TO ANY DEVICE, IMPLANT OR GRAFT, INITIAL ENCOUNTER: ICD-10-CM

## 2024-07-23 DIAGNOSIS — Z78.9 ON TOTAL PARENTERAL NUTRITION (TPN): ICD-10-CM

## 2024-07-23 DIAGNOSIS — R10.11 ABDOMINAL PAIN, RIGHT UPPER QUADRANT: ICD-10-CM

## 2024-07-23 DIAGNOSIS — Z93.4 JEJUNOSTOMY STATUS (H): ICD-10-CM

## 2024-07-23 DIAGNOSIS — K59.00 CONSTIPATION: ICD-10-CM

## 2024-07-23 DIAGNOSIS — R10.11 RIGHT UPPER QUADRANT ABDOMINAL PAIN: ICD-10-CM

## 2024-07-23 DIAGNOSIS — Z94.83 S/P PANCREATIC ISLET CELL TRANSPLANTATION (H): ICD-10-CM

## 2024-07-23 DIAGNOSIS — Z94.89 HISTORY OF PANCREATIC ISLET CELL TRANSPLANTATION: ICD-10-CM
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
## 2024-07-23 DIAGNOSIS — K59.00 CONSTIPATION, UNSPECIFIED CONSTIPATION TYPE: ICD-10-CM

## 2024-07-23 LAB
ALBUMIN SERPL BCG-MCNC: 3.9 G/DL (ref 3.5–5.2)
ALBUMIN UR-MCNC: NEGATIVE MG/DL
ALP SERPL-CCNC: 102 U/L (ref 40–150)
ALT SERPL W P-5'-P-CCNC: 23 U/L (ref 0–50)
ANION GAP SERPL CALCULATED.3IONS-SCNC: 9 MMOL/L (ref 7–15)
APPEARANCE UR: CLEAR
AST SERPL W P-5'-P-CCNC: 25 U/L (ref 0–45)
BASOPHILS # BLD AUTO: 0.1 10E3/UL (ref 0–0.2)
BASOPHILS NFR BLD AUTO: 1 %
BILIRUB SERPL-MCNC: 0.4 MG/DL
BILIRUB UR QL STRIP: NEGATIVE
BUN SERPL-MCNC: 21.1 MG/DL (ref 6–20)
CALCIUM SERPL-MCNC: 8.8 MG/DL (ref 8.8–10.4)
CHLORIDE SERPL-SCNC: 102 MMOL/L (ref 98–107)
COLOR UR AUTO: ABNORMAL
CREAT SERPL-MCNC: 0.62 MG/DL (ref 0.51–0.95)
EGFRCR SERPLBLD CKD-EPI 2021: >90 ML/MIN/1.73M2
EOSINOPHIL # BLD AUTO: 0.5 10E3/UL (ref 0–0.7)
EOSINOPHIL NFR BLD AUTO: 7 %
ERYTHROCYTE [DISTWIDTH] IN BLOOD BY AUTOMATED COUNT: 13.7 % (ref 10–15)
GLUCOSE SERPL-MCNC: 108 MG/DL (ref 70–99)
GLUCOSE UR STRIP-MCNC: NEGATIVE MG/DL
HCO3 SERPL-SCNC: 28 MMOL/L (ref 22–29)
HCT VFR BLD AUTO: 39.8 % (ref 35–47)
HGB BLD-MCNC: 12.6 G/DL (ref 11.7–15.7)
HGB UR QL STRIP: NEGATIVE
IMM GRANULOCYTES # BLD: 0 10E3/UL
IMM GRANULOCYTES NFR BLD: 0 %
KETONES UR STRIP-MCNC: NEGATIVE MG/DL
LACTATE SERPL-SCNC: 0.9 MMOL/L (ref 0.7–2)
LEUKOCYTE ESTERASE UR QL STRIP: ABNORMAL
LYMPHOCYTES # BLD AUTO: 3 10E3/UL (ref 0.8–5.3)
LYMPHOCYTES NFR BLD AUTO: 38 %
MCH RBC QN AUTO: 30.4 PG (ref 26.5–33)
MCHC RBC AUTO-ENTMCNC: 31.7 G/DL (ref 31.5–36.5)
MCV RBC AUTO: 96 FL (ref 78–100)
MONOCYTES # BLD AUTO: 1 10E3/UL (ref 0–1.3)
MONOCYTES NFR BLD AUTO: 12 %
NEUTROPHILS # BLD AUTO: 3.2 10E3/UL (ref 1.6–8.3)
NEUTROPHILS NFR BLD AUTO: 42 %
NITRATE UR QL: NEGATIVE
NRBC # BLD AUTO: 0 10E3/UL
NRBC BLD AUTO-RTO: 0 /100
PH UR STRIP: 6 [PH] (ref 5–7)
PLATELET # BLD AUTO: 428 10E3/UL (ref 150–450)
POTASSIUM SERPL-SCNC: 4.8 MMOL/L (ref 3.4–5.3)
PROT SERPL-MCNC: 6.8 G/DL (ref 6.4–8.3)
RBC # BLD AUTO: 4.15 10E6/UL (ref 3.8–5.2)
RBC URINE: 0 /HPF
SODIUM SERPL-SCNC: 139 MMOL/L (ref 135–145)
SP GR UR STRIP: 1.01 (ref 1–1.03)
SQUAMOUS EPITHELIAL: 1 /HPF
TRANSITIONAL EPI: <1 /HPF
UROBILINOGEN UR STRIP-MCNC: NORMAL MG/DL
WBC # BLD AUTO: 7.8 10E3/UL (ref 4–11)
WBC URINE: 11 /HPF

## 2024-07-23 PROCEDURE — 120N000002 HC R&B MED SURG/OB UMMC

## 2024-07-23 PROCEDURE — 250N000011 HC RX IP 250 OP 636: Performed by: STUDENT IN AN ORGANIZED HEALTH CARE EDUCATION/TRAINING PROGRAM

## 2024-07-23 PROCEDURE — 99285 EMERGENCY DEPT VISIT HI MDM: CPT | Mod: FS | Performed by: EMERGENCY MEDICINE

## 2024-07-23 PROCEDURE — 82040 ASSAY OF SERUM ALBUMIN: CPT

## 2024-07-23 PROCEDURE — 99285 EMERGENCY DEPT VISIT HI MDM: CPT | Mod: 25 | Performed by: EMERGENCY MEDICINE

## 2024-07-23 PROCEDURE — 99418 PROLNG IP/OBS E/M EA 15 MIN: CPT

## 2024-07-23 PROCEDURE — 85025 COMPLETE CBC W/AUTO DIFF WBC: CPT

## 2024-07-23 PROCEDURE — 74177 CT ABD & PELVIS W/CONTRAST: CPT

## 2024-07-23 PROCEDURE — 250N000011 HC RX IP 250 OP 636

## 2024-07-23 PROCEDURE — 83036 HEMOGLOBIN GLYCOSYLATED A1C: CPT

## 2024-07-23 PROCEDURE — 250N000013 HC RX MED GY IP 250 OP 250 PS 637

## 2024-07-23 PROCEDURE — 87086 URINE CULTURE/COLONY COUNT: CPT

## 2024-07-23 PROCEDURE — 74177 CT ABD & PELVIS W/CONTRAST: CPT | Mod: 26 | Performed by: STUDENT IN AN ORGANIZED HEALTH CARE EDUCATION/TRAINING PROGRAM

## 2024-07-23 PROCEDURE — 81001 URINALYSIS AUTO W/SCOPE: CPT

## 2024-07-23 PROCEDURE — 83605 ASSAY OF LACTIC ACID: CPT

## 2024-07-23 PROCEDURE — 99223 1ST HOSP IP/OBS HIGH 75: CPT

## 2024-07-23 PROCEDURE — 258N000003 HC RX IP 258 OP 636

## 2024-07-23 PROCEDURE — 99207 PR APP CREDIT; MD BILLING SHARED VISIT: CPT | Performed by: INTERNAL MEDICINE

## 2024-07-23 PROCEDURE — 36415 COLL VENOUS BLD VENIPUNCTURE: CPT

## 2024-07-23 RX ORDER — PROCHLORPERAZINE 25 MG
25 SUPPOSITORY, RECTAL RECTAL EVERY 12 HOURS PRN
Qty: 10 SUPPOSITORY | Refills: 2 | Status: SHIPPED | OUTPATIENT
Start: 2024-07-23

## 2024-07-23 RX ORDER — DEXTROSE MONOHYDRATE 25 G/50ML
25-50 INJECTION, SOLUTION INTRAVENOUS
Status: DISCONTINUED | OUTPATIENT
Start: 2024-07-23 | End: 2024-07-25 | Stop reason: HOSPADM

## 2024-07-23 RX ORDER — METHOCARBAMOL 750 MG/1
750 TABLET, FILM COATED ORAL 4 TIMES DAILY PRN
Status: DISCONTINUED | OUTPATIENT
Start: 2024-07-23 | End: 2024-07-25 | Stop reason: HOSPADM

## 2024-07-23 RX ORDER — AMITRIPTYLINE HCL 25 MG
60 TABLET ORAL AT BEDTIME
Status: DISCONTINUED | OUTPATIENT
Start: 2024-07-24 | End: 2024-07-25 | Stop reason: HOSPADM

## 2024-07-23 RX ORDER — ZOLMITRIPTAN 2.5 MG/1
5 TABLET, ORALLY DISINTEGRATING ORAL
Status: DISCONTINUED | OUTPATIENT
Start: 2024-07-23 | End: 2024-07-25 | Stop reason: HOSPADM

## 2024-07-23 RX ORDER — KETOROLAC TROMETHAMINE 15 MG/ML
15 INJECTION, SOLUTION INTRAMUSCULAR; INTRAVENOUS ONCE
Status: COMPLETED | OUTPATIENT
Start: 2024-07-23 | End: 2024-07-23

## 2024-07-23 RX ORDER — LIDOCAINE 4 G/G
1 PATCH TOPICAL EVERY 24 HOURS
Status: DISCONTINUED | OUTPATIENT
Start: 2024-07-24 | End: 2024-07-25 | Stop reason: HOSPADM

## 2024-07-23 RX ORDER — AMOXICILLIN 250 MG
2 CAPSULE ORAL 2 TIMES DAILY PRN
Status: DISCONTINUED | OUTPATIENT
Start: 2024-07-23 | End: 2024-07-25 | Stop reason: HOSPADM

## 2024-07-23 RX ORDER — BACLOFEN 20 MG/1
20 TABLET ORAL EVERY 8 HOURS SCHEDULED
Status: DISCONTINUED | OUTPATIENT
Start: 2024-07-24 | End: 2024-07-25 | Stop reason: HOSPADM

## 2024-07-23 RX ORDER — PROCHLORPERAZINE MALEATE 10 MG
10 TABLET ORAL EVERY 6 HOURS PRN
Status: DISCONTINUED | OUTPATIENT
Start: 2024-07-23 | End: 2024-07-25 | Stop reason: HOSPADM

## 2024-07-23 RX ORDER — DIPHENHYDRAMINE HCL 12.5MG/5ML
25 LIQUID (ML) ORAL 4 TIMES DAILY PRN
Status: DISCONTINUED | OUTPATIENT
Start: 2024-07-23 | End: 2024-07-25 | Stop reason: HOSPADM

## 2024-07-23 RX ORDER — IOPAMIDOL 755 MG/ML
77 INJECTION, SOLUTION INTRAVASCULAR ONCE
Status: COMPLETED | OUTPATIENT
Start: 2024-07-23 | End: 2024-07-23

## 2024-07-23 RX ORDER — ESTRADIOL 10 UG/1
10 INSERT VAGINAL
Status: DISCONTINUED | OUTPATIENT
Start: 2024-07-25 | End: 2024-07-25 | Stop reason: HOSPADM

## 2024-07-23 RX ORDER — ACETAMINOPHEN 325 MG/10.15ML
650 LIQUID ORAL EVERY 6 HOURS PRN
Status: DISCONTINUED | OUTPATIENT
Start: 2024-07-23 | End: 2024-07-25 | Stop reason: HOSPADM

## 2024-07-23 RX ORDER — FAMOTIDINE 40 MG/5ML
20 POWDER, FOR SUSPENSION ORAL DAILY
Status: DISCONTINUED | OUTPATIENT
Start: 2024-07-24 | End: 2024-07-25 | Stop reason: HOSPADM

## 2024-07-23 RX ORDER — LIDOCAINE 40 MG/G
CREAM TOPICAL
Status: DISCONTINUED | OUTPATIENT
Start: 2024-07-23 | End: 2024-07-25 | Stop reason: HOSPADM

## 2024-07-23 RX ORDER — BUPRENORPHINE 2 MG/1
2 TABLET SUBLINGUAL 3 TIMES DAILY
Status: DISCONTINUED | OUTPATIENT
Start: 2024-07-23 | End: 2024-07-25 | Stop reason: HOSPADM

## 2024-07-23 RX ORDER — HYDROMORPHONE HYDROCHLORIDE 2 MG/1
2 TABLET ORAL ONCE
Status: COMPLETED | OUTPATIENT
Start: 2024-07-23 | End: 2024-07-23

## 2024-07-23 RX ORDER — SCOLOPAMINE TRANSDERMAL SYSTEM 1 MG/1
1 PATCH, EXTENDED RELEASE TRANSDERMAL
Status: DISCONTINUED | OUTPATIENT
Start: 2024-07-24 | End: 2024-07-25 | Stop reason: HOSPADM

## 2024-07-23 RX ORDER — CETIRIZINE HYDROCHLORIDE 10 MG/1
10 TABLET ORAL 2 TIMES DAILY
Status: DISCONTINUED | OUTPATIENT
Start: 2024-07-23 | End: 2024-07-25 | Stop reason: HOSPADM

## 2024-07-23 RX ORDER — AMOXICILLIN 250 MG
1 CAPSULE ORAL 2 TIMES DAILY PRN
Status: DISCONTINUED | OUTPATIENT
Start: 2024-07-23 | End: 2024-07-25 | Stop reason: HOSPADM

## 2024-07-23 RX ORDER — PROCHLORPERAZINE 25 MG
25 SUPPOSITORY, RECTAL RECTAL EVERY 12 HOURS PRN
Status: DISCONTINUED | OUTPATIENT
Start: 2024-07-23 | End: 2024-07-25 | Stop reason: HOSPADM

## 2024-07-23 RX ORDER — CALCIUM CARBONATE 500 MG/1
1000 TABLET, CHEWABLE ORAL 4 TIMES DAILY PRN
Status: DISCONTINUED | OUTPATIENT
Start: 2024-07-23 | End: 2024-07-25 | Stop reason: HOSPADM

## 2024-07-23 RX ORDER — NICOTINE POLACRILEX 4 MG
15-30 LOZENGE BUCCAL
Status: DISCONTINUED | OUTPATIENT
Start: 2024-07-23 | End: 2024-07-25 | Stop reason: HOSPADM

## 2024-07-23 RX ADMIN — METHOCARBAMOL 750 MG: 750 TABLET ORAL at 23:26

## 2024-07-23 RX ADMIN — HYDROMORPHONE HYDROCHLORIDE 2 MG: 2 TABLET ORAL at 17:17

## 2024-07-23 RX ADMIN — PROCHLORPERAZINE MALEATE 10 MG: 10 TABLET ORAL at 22:59

## 2024-07-23 RX ADMIN — PROMETHAZINE HYDROCHLORIDE 25 MG: 25 INJECTION INTRAMUSCULAR; INTRAVENOUS at 23:04

## 2024-07-23 RX ADMIN — KETOROLAC TROMETHAMINE 15 MG: 15 INJECTION INTRAMUSCULAR; INTRAVENOUS at 23:26

## 2024-07-23 RX ADMIN — IOPAMIDOL 77 ML: 755 INJECTION, SOLUTION INTRAVENOUS at 19:45

## 2024-07-23 RX ADMIN — BUPRENORPHINE 2 MG: 2 TABLET SUBLINGUAL at 23:26

## 2024-07-23 ASSESSMENT — ACTIVITIES OF DAILY LIVING (ADL)
ADLS_ACUITY_SCORE: 39
ADLS_ACUITY_SCORE: 37
ADLS_ACUITY_SCORE: 39
ADLS_ACUITY_SCORE: 37
ADLS_ACUITY_SCORE: 39
ADLS_ACUITY_SCORE: 39
ADLS_ACUITY_SCORE: 37

## 2024-07-23 ASSESSMENT — COLUMBIA-SUICIDE SEVERITY RATING SCALE - C-SSRS
1. IN THE PAST MONTH, HAVE YOU WISHED YOU WERE DEAD OR WISHED YOU COULD GO TO SLEEP AND NOT WAKE UP?: NO
6. HAVE YOU EVER DONE ANYTHING, STARTED TO DO ANYTHING, OR PREPARED TO DO ANYTHING TO END YOUR LIFE?: NO
2. HAVE YOU ACTUALLY HAD ANY THOUGHTS OF KILLING YOURSELF IN THE PAST MONTH?: NO

## 2024-07-23 NOTE — ED PROVIDER NOTES
[place] EMERGENCY DEPARTMENT ([facility])    7/23/24       ED PROVIDER NOTE   History     Chief Complaint   Patient presents with    Gtube Problem     The history is provided by the patient and medical records. No  was used.     Dinora Mcghee is a 59 year old female with a history of s/p islet cell transplant with pancreatectomy, abscess at g-tube site, chronic abdominal pain, pancreatitis, severe gastroparesis, constipation, GERD, cholangitis, hypothyroidism, and DM II who presents to the ED for evaluation of pain at g-tube site. Patient is on tube feedings, TPN, and does take in some oral intake for pleasure.  She states that she began having moderate to severe pain at her G-tube site approximately 1 week ago.  She contacted her gastroenterology office who recommended exchange of her G-tube at that time.  Tube was exchanged at home by her nursing care team around 7/17/2024 without any change or improvement of her pain symptoms.  She states that pain is solely located at and around her G-tube site which is located in her right abdomen.  She has a J-tube site in her left abdomen that is not bothersome and does not have pain in that area.  She reports stable, chronic drainage around the G-tube site without any concerns at the J-tube site.  She states that her G-tube continues to flush appropriately and is able to administer her G-tube feeds without difficulty or signs of obstruction.  She reports chronic nausea that she takes IV promethazine 25 mg 3 times a day for.  She denies any episodes of emesis.  She denies fever, chills, urinary symptoms, or changes in her bowel movements.  She states that she does have a history of constipation.     According to chart review, she has been in close contact with her gastroenterology team about this severe pain at her G-tube site.  It was recommended to take the patient to the OR next Tuesday, 7/30/2024, with Dr. Park the patient has an  appointment scheduled at the [facility] with gastroenterology for full evaluation and establishment of care for her current chronic conditions.  They recommended she seek evaluation in the ED for pain control and discussion of surgical procedure and exchange of her G-tube with gastroenterology this week.    Past Medical History  Past Medical History:   Diagnosis Date    Allergic rhinitis, cause unspecified     Allergy to other foods     strawberries, apples, celeries, alice, watermelon    Arthritis     left knee    Choledocholithiasis     long after cholecystectomy    Chronic abdominal pain     Chronic constipation     Chronic nausea     Chronic pancreatitis (H)     Degeneration of lumbar or lumbosacral intervertebral disc     Esophageal reflux     w/ hiatal hernia    Gastroparesis     Hiatal hernia     History of pituitary adenoma     s/p resection    Hypothyroidism     Migraines     Mild hyperlipidemia     On tube feeding diet     presence of GJ tube    Pancreatic disease     PD stricture, 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 endometriosis growth    APPENDECTOMY       SECTION  1993    COLONOSCOPY  2014    COLONOSCOPY N/A 2023    Procedure: COLONOSCOPY, WITH POLYPECTOMY AND BIOPSY;  Surgeon: Percy Chamorro MD;  [facility]    ENDOSCOPIC INSERTION TUBE GASTROSTOMY N/A 2021    Procedure: Gastrojejonostomy placement with jejunopexy, PEG tube exchange;  Surgeon: Zackery Montoya MD;  [facility]    ENDOSCOPIC RETROGRADE CHOLANGIOPANCREATOGRAM N/A 2015    Procedure: ENDOSCOPIC RETROGRADE CHOLANGIOPANCREATOGRAM;  Surgeon: Mandeep Park MD;   [facility]    ENDOSCOPIC RETROGRADE CHOLANGIOPANCREATOGRAM N/A 02/09/2016    Procedure: COMBINED ENDOSCOPIC RETROGRADE CHOLANGIOPANCREATOGRAPHY, PLACE TUBE/STENT;  Surgeon: Mandeep Park MD;  [facility]    ENDOSCOPIC RETROGRADE CHOLANGIOPANCREATOGRAM N/A 03/17/2016    Procedure: COMBINED ENDOSCOPIC RETROGRADE CHOLANGIOPANCREATOGRAPHY, REMOVE FOREIGN BODY OR STENT/TUBE;  Surgeon: Mandeep Park MD;  [facility]    ENDOSCOPIC RETROGRADE CHOLANGIOPANCREATOGRAM N/A 08/02/2016    Procedure: COMBINED ENDOSCOPIC RETROGRADE CHOLANGIOPANCREATOGRAPHY, PLACE TUBE/STENT;  Surgeon: Mandeep Park MD;  [facility]    ENDOSCOPIC RETROGRADE CHOLANGIOPANCREATOGRAM N/A 08/26/2016    Procedure: COMBINED ENDOSCOPIC RETROGRADE CHOLANGIOPANCREATOGRAPHY, REMOVE FOREIGN BODY OR STENT/TUBE;  Surgeon: Mandeep Park MD;  [facility]    ENDOSCOPIC ULTRASOUND UPPER GASTROINTESTINAL TRACT (GI) N/A 10/03/2016    Procedure: ENDOSCOPIC ULTRASOUND, ESOPHAGOSCOPY / UPPER GASTROINTESTINAL TRACT (GI);  Surgeon: Guru Jose Rodas MD;  Location:  OR    ENT SURGERY  1989    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    ENTEROSCOPY SMALL BOWEL N/A 02/03/2022    Procedure: ENTEROSCOPY with possible fistula closure;  Surgeon: Francisco Dodson MD;  Location:  GI    ESOPHAGOSCOPY, GASTROSCOPY, DUODENOSCOPY (EGD), COMBINED N/A 06/24/2015    Procedure: COMBINED ESOPHAGOSCOPY, GASTROSCOPY, DUODENOSCOPY (EGD), REMOVE FOREIGN BODY;  Surgeon: Mandeep Park MD;  Location:  GI    ESOPHAGOSCOPY, GASTROSCOPY, DUODENOSCOPY (EGD), COMBINED N/A 10/25/2015    Procedure: COMBINED ESOPHAGOSCOPY, GASTROSCOPY, DUODENOSCOPY (EGD);  Surgeon: Sammy Amaro MD;  Location:  GI    ESOPHAGOSCOPY, GASTROSCOPY, DUODENOSCOPY (EGD), COMBINED N/A 10/25/2015    Procedure: COMBINED ESOPHAGOSCOPY, GASTROSCOPY, DUODENOSCOPY (EGD), BIOPSY SINGLE OR MULTIPLE;  Surgeon: Sammy Amaro MD;   [facility]    ESOPHAGOSCOPY, GASTROSCOPY, DUODENOSCOPY (EGD), COMBINED N/A 01/31/2023    Procedure: ESOPHAGOGASTRODUODENOSCOPY (EGD) with peg replacement ;  Surgeon: Mandeep Park MD;  [facility]    ESOPHAGOSCOPY, GASTROSCOPY, DUODENOSCOPY (EGD), DILATATION, COMBINED      EXCISE LESION TRUNK N/A 04/17/2017    Procedure: EXCISE LESION TRUNK;  Removal of Abdominal Foreign Body;  Surgeon: Nestor Phoenix MD;  [facility]    HC ESOPH/GAS REFLUX TEST W NASAL IMPED >1 HR N/A 11/19/2015    Procedure: ESOPHAGEAL IMPEDENCE FUNCTION TEST WITH 24 HOUR PH GREATER THAN 1 HOUR;  Surgeon: Thiago Apple MD;  [facility]: [facility]    HC UGI ENDOSCOPY DIAG W BIOPSY  09/17/2008    HC UGI ENDOSCOPY DIAG W BIOPSY  09/27/2012    HC UGI ENDOSCOPY W ESOPHAGEAL DILATION BALLOON <30MM  09/17/2008    HC UGI ENDOSCOPY W EUS N/A 05/05/2015    Procedure: COMBINED ENDOSCOPIC ULTRASOUND, ESOPHAGOSCOPY, GASTROSCOPY, DUODENOSCOPY (EGD);  Surgeon: Wm Dueñas MD;  [facility]    HC WRIST ARTHROSCOP,RELEASE XVERS LIG Bilateral 12/17/2008    INJECT TRANSVERSUS ABDOMINIS PLANE (TAP) BLOCK BILATERAL Left 09/22/2016    Procedure: INJECT TRANSVERSUS ABDOMINIS PLANE (TAP) BLOCK BILATERAL;  Surgeon: Dickson Corrigan MD;  [facility]    INJECT TRIGGER POINT Bilateral 09/08/2022    Procedure: Abdominal trigger point injection with ultrasound;  Surgeon: Monika Mahajan MD;  Location: [facility]    INJECT TRIGGER POINT SINGLE / MULTIPLE 3 OR MORE MUSCLES Right 11/15/2022    Procedure: Trigger point injections right abdomen with ultrasound guidance;  Surgeon: Monika Mahajan MD;  Location: [facility] OR    IR CHEST PORT PLACEMENT < 5 YRS OF AGE  06/10/2022    IR CVC TUNNEL PLACEMENT > 5 YRS OF AGE  4/15/2024    IR PORT REMOVAL RIGHT  11/09/2023    laparoscopic pineda  01/01/1995    LAPAROSCOPIC HERNIORRHAPHY INCISIONAL N/A 08/23/2018    Procedure: LAPAROSCOPIC HERNIORRHAPHY INCISIONAL;  Laparoscopic Incisional Hernia Repair with  Symbotex Mesh Implant;  Surgeon: Nestor Phoenix MD;  [facility]    LAPAROSCOPIC PANCREATECTOMY, TRANSPLANT AUTO ISLET CELL N/A 12/28/2016    Procedure: LAPAROSCOPIC PANCREATECTOMY, TRANSPLANT AUTO ISLET CELL;  Surgeon: Nestor Phoenix MD;  [facility]    LAPAROTOMY EXPLORATORY N/A 01/31/2023    Procedure: Exploratory Laparotomy, lysis of adhesions, Perforated J-Junostomy Resection, Replace J-Junostomy site;  Surgeon: Elver Bauer MD;  [facility]    LAPAROTOMY, LYSIS ADHESIONS, COMBINED N/A 01/31/2023    Procedure: Dilatation of jejunostomy feeding tube, track with placement of jejunostomy tube with fluoroscopy;  Surgeon: Elver Bauer MD;  [facility]    PICC DOUBLE LUMEN PLACEMENT Left 11/13/2023    Basilic Vein 5F DL 43 cm    REMOVE AND REPLACE BREAST IMPLANT PROSTHESIS N/A 12/30/2022    Procedure: Exploratory Laparotomy, lysis of adhesions, small bowel resection. Placement of gastric jejunostomy for enteral feeding.;  Surgeon: Elver Bauer MD;  [facility]    REMOVE GASTROSTOMY TUBE ADULT N/A 01/28/2022    Procedure: REMOVAL, GASTROSTOMY TUBE, ADULT;  Surgeon: Mandeep Park MD;  [facility]    REPLACE GASTROJEJUNOSTOMY TUBE, PERCUTANEOUS N/A 09/07/2021    Procedure: GASTROJEJUNOSTOMY TUBE PLACEMENT, PERCUTANEOUS, WITH GASTROPEXY;  Surgeon: Mandeep Park MD;  [facility]    REPLACE GASTROJEJUNOSTOMY TUBE, PERCUTANEOUS N/A 09/22/2021    Procedure: REPLACEMENT, GASTROJEJUNOSTOMY TUBE, PERCUTANEOUS;  Surgeon: Zackery Montoya MD;  [facility]    REPLACE GASTROJEJUNOSTOMY TUBE, PERCUTANEOUS N/A 11/22/2022    Procedure: REPLACEMENT, GASTROJEJUNOSTOMY TUBE, PERCUTANEOUS;  Surgeon: Mandeep Park MD;  [facility]    REPLACE GASTROJEJUNOSTOMY TUBE, PERCUTANEOUS N/A 12/09/2022    Procedure: REPLACEMENT, GASTROJEJUNOSTOMY TUBE, PERCUTANEOUS with ENDOSCOPIC SUTURING.;  Surgeon: Mandeep Park MD;  [facility]     REPLACE GASTROJEJUNOSTOMY TUBE, PERCUTANEOUS N/A 2023    Procedure: Replace Gastrojejunostomy Tube, Percutaneous;  Surgeon: Mandeep Park MD;  [facility]    REPLACE GASTROJEJUNOSTOMY TUBE, PERCUTANEOUS N/A 2023    Procedure: Replace Gastrojejunostomy Tube, Percutaneous;  Surgeon: Francisco Dodson MD;  [facility]    REPLACE GASTROJEJUNOSTOMY TUBE, PERCUTANEOUS N/A 2023    Procedure: Replace Gastrojejunostomy Tube, Percutaneous;  Surgeon: Mandeep Park MD;  [facility]    REPLACE JEJUNOSTOMY TUBE, PERCUTANEOUS N/A 09/10/2021    Procedure: UPPER ENDOSCOPY, REPLACEMENT OF PERCUTANEOUS GASTROJEJUNOSTOMY TUBE, T-TAG GASTROPEXY;  Surgeon: Zackery Montoya MD;  [facility]    REPLACE JEJUNOSTOMY TUBE, PERCUTANEOUS N/A 2021    Procedure: REPLACEMENT, JEJUNOSTOMY TUBE, PERCUTANEOUS;  Surgeon: Mandeep Park MD;  [facility]    REPLACE JEJUNOSTOMY TUBE, PERCUTANEOUS N/A 2023    Procedure: REPLACEMENT, JEJUNOSTOMY TUBE, PERCUTANEOUS;  Surgeon: Mandeep Park MD;  [facility]    REPLACE JEJUNOSTOMY TUBE, PERCUTANEOUS  2023    Procedure: Replace Jejunostomy Tube, Percutaneous;  Surgeon: Mandeep Park MD;  [facility]    REPLACE JEJUNOSTOMY TUBE, PERCUTANEOUS N/A 2024    Procedure: REPLACEMENT, JEJUNOSTOMY TUBE, PERCUTANEOUS;  Surgeon: Francisco Dodson MD;  [facility]    transphenoidal pituitary resection  1990    [facility]  DELIVERY ONLY  1996    [facility]  DELIVERY ONLY  1998    repeat c section with incidental cystotomy with repair    [facility] EXCIS PITUITARY,TRANSNASAL/SEPTAL  1980    pituitary tumor removed for diabetes insipidus    [facility] TOTAL ABDOM HYSTERECTOMY  1999    w/ bilateral salpingoophorectomy      acetaminophen (TYLENOL) 325 MG/10.15ML solution  baclofen (LIORESAL) 10 MG tablet  buprenorphine (SUBUTEX) 2 MG SUBL sublingual tablet  cetirizine (ZYRTEC) 10 MG  "tablet  COMPOUNDED NON-CONTROLLED SUBSTANCE (CMPD RX) - PHARMACY TO MIX COMPOUNDED MEDICATION  COMPOUNDED NON-CONTROLLED SUBSTANCE (CMPD RX) - PHARMACY TO MIX COMPOUNDED MEDICATION  COMPOUNDED NON-CONTROLLED SUBSTANCE (CMPD RX) - PHARMACY TO MIX COMPOUNDED MEDICATION  Continuous Blood Gluc Sensor (FREESTYLE LISA 3 SENSOR) MISC  Digestive Enzyme Cartridge (RELIZORB) MARCO  diphenhydrAMINE (BENADRYL) 12.5 MG/5ML solution  droNABinol (SYNDROS) 5 MG/ML oral soln  DULoxetine HCl 60 MG CSDR  estradiol (VAGIFEM) 10 MCG TABS vaginal tablet  famotidine (PEPCID) 40 MG/5ML suspension  glucagon 1 MG kit  glucose 40 % GEL gel  insulin aspart (NOVOLOG FLEXPEN) 100 UNIT/ML pen  insulin aspart (NOVOLOG PEN) 100 UNIT/ML pen  insulin detemir (LEVEMIR PEN) 100 UNIT/ML pen  insulin syringe-needle U-100 (30G X 1/2\" 0.3 ML) 30G X 1/2\" 0.3 ML miscellaneous  lactated ringers infusion  levothyroxine (SYNTHROID) 25 mcg/mL SUSP  lidocaine (LIDODERM) 5 % patch  lipase-protease-amylase (CREON) 82567-28[address]-075576 units CPEP per EC capsule  methocarbamol (ROBAXIN) 750 MG tablet  MOTEGRITY 2 MG tablet  naloxegol (MOVANTIK) 12.5 MG TABS tablet  Nutritional Supplements (BOOST HIGH PROTEIN) LIQD  pantoprazole (PROTONIX) 40 mg IV push injection  prochlorperazine (COMPAZINE) 10 MG tablet  prochlorperazine (COMPRO) 25 MG suppository  promethazine 25 mg  rimegepant (NURTEC) 75 MG ODT tablet  scopolamine (TRANSDERM) 1 MG/3DAYS 72 hr patch  sennosides (SENOKOT) 8.8 MG/5ML syrup  Sharps Container (BD SHARPS ) MISC  silver nitrate (ARZOL SILVER NIT APPLICATORS) 75-25 % miscellaneous  sodium phosphate, mineral oil, magnesium citrate enema  STATIN NOT PRESCRIBED (INTENTIONAL)  zinc oxide - white petrolatum (CRITIC-AID THICK MOIST BARRIER) 20-51% PSTE topical paste  ZOLMitriptan (ZOMIG-ZMT) 5 MG ODT      Allergies   Allergen Reactions    Apple Juice Anaphylaxis    Corticosteroids Other (See Comments)     All oral, IV and injectable steroids are " contraindicated (unless in life threatening situations) in Islet Auto transplant recipients. They can cause irreversible loss of islet cell function. Please contact patient's transplant care coordinator ADI Gaffney RN at [phone]/pager [phone] and/or endocrinologist prior to administration.      Depakote [Divalproex Sodium] Other (See Comments)     Chest pain    Zithromax [Azithromycin Dihydrate] Anaphylaxis     Noted in 4/7/08 ER    Bromfenac      Other reaction(s): Headache    Codeine      Other reaction(s): Hallucinations    Darvocet [Propoxyphene N-Apap] Itching    Morphine Nausea and Vomiting and Rash    Nalbuphine Hcl Rash     RASH :nubaine    Zosyn [Piperacillin-Tazobactam In Dex] Rash     Possible allergy, did have a diffuse rash that seemed drug related but could have also been related to soap in the hospital.     Bactrim [Sulfamethoxazole W-Trimethoprim] Other (See Comments) and Nausea and Vomiting     Severely low liver function.    Statins Other (See Comments)     Previous liver issues, risks vs benefits felt to not warrant statin.  Discussed Oct 2022 visit    Tape [Adhesive Tape] Blisters    Tramadol     Zofran [Ondansetron] Other (See Comments)     migraine    Flagyl [Metronidazole] Hives and Rash     Family History  Family History   Problem Relation Age of Onset    Lipids Mother     Hypertension Mother     Thyroid Disease Mother     Depression Mother     Angina Mother     GERD Mother     Skin Cancer Mother     Migraines Mother     Autoimmune Disease Mother     Hyperlipidemia Mother     Mental Illness Mother     Cerebrovascular Disease Mother         11/2023    Other Cancer Mother         skin-basil cell    Anxiety Disorder Mother     Eye Disorder Father         cataract, detached retina    Myocardial Infarction Father 60    Lipids Father     Cerebrovascular Disease Father     Depression Father     Substance Abuse Father     Anesthesia Reaction Father         stroke right after surgery     Cataracts Father     Osteoarthritis Father     Ulcerative Colitis Father     Autoimmune Disease Father     Heart Disease Father     Hyperlipidemia Father     Mental Illness Father     Other Cancer Father         myloma    Anxiety Disorder Father     Interpersonal Violence Sister     Coronary Artery Disease Sister         angioplasty    GERD Sister     Substance Abuse Sister     Cerebrovascular Disease Sister         2005    Depression Sister     Thyroid Disease Sister     Eye Disorder Maternal Grandmother         cataract    Thyroid Disease Maternal Grandmother     Diabetes Maternal Grandfather     Eye Disorder Paternal Grandmother         cataract    Diabetes Paternal Grandmother     Eye Disorder Paternal Grandfather         cataract    Diabetes Paternal Grandfather     Substance Abuse Paternal Grandfather     Eye Disorder Son         ptosis    Depression Son     Anxiety Disorder Son     Heart Disease Paternal Aunt     Diabetes Paternal Aunt     Diabetes Paternal Uncle     Heart Disease Paternal Uncle     Depression Nephew     Anxiety Disorder Nephew     Thyroid Disease Nephew     Diabetes Type 2  Cousin         paternal cousin    Autoimmune Disease Cousin     Autoimmune Disease Sister     Depression Sister     Mental Illness Sister     Substance Abuse Sister     Thyroid Disease Sister     Depression Son     Mental Illness Son     Anxiety Disorder Son     Thyroid Disease Nephew     Anxiety Disorder Nephew      Social History   Social History     Tobacco Use    Smoking status: Former     Current packs/day: 0.00     Average packs/day: 0.5 packs/day for 6.3 years (3.2 ttl pk-yrs)     Types: Cigarettes     Start date: 1985     Quit date: 1992     Years since quittin.5    Tobacco comments:     no 2nd hand   Vaping Use    Vaping status: Some Days    Substances: THC, CBD   Substance Use Topics    Alcohol use: Not Currently     Alcohol/week: 3.0 - 6.0 standard drinks of alcohol     Types: 1 - 2 Glasses of  wine, 1 - 2 Cans of beer, 1 - 2 Shots of liquor per week     Comment: none since [facility]    Drug use: No     Comment: medical canabis tincture and vape      Past medical history, past surgical history, medications, allergies, family history, and social history were reviewed with the patient. No additional pertinent items.     A medically appropriate review of systems was performed with pertinent positives and negatives noted in the HPI, and all other systems negative.    Physical Exam   BP: 102/66  Pulse: 98  Temp: 97.6  F (36.4  C)  Resp: 20  SpO2: 99 %    Physical Exam  Constitutional:       General: She is not in acute distress.     Appearance: Normal appearance. She is not ill-appearing, toxic-appearing or diaphoretic.   HENT:      Mouth/Throat:      Mouth: Mucous membranes are moist.      Pharynx: Oropharynx is clear.   Cardiovascular:      Rate and Rhythm: Normal rate and regular rhythm.   Pulmonary:      Effort: Pulmonary effort is normal. No respiratory distress.      Breath sounds: Normal breath sounds.   Abdominal:      General: Abdomen is flat.      Palpations: Abdomen is soft.      Tenderness: There is abdominal tenderness (Moderate to severe tenderness/peritonitic signs around the G-tube site) in the right upper quadrant. There is no guarding or rebound.       Skin:     General: Skin is warm.      Findings: No rash.   Neurological:      General: No focal deficit present.      Mental Status: She is alert. Mental status is at baseline.   Psychiatric:         Mood and Affect: Mood normal.         Behavior: Behavior normal.         ED Course, Procedures, & Data     ED Course as of 07/23/24 2248   Tue Jul 23, 2024 2044 GI paged   2100 Discussed patient case with gastroenterology fellow who recommended admission to the medicine service for bowel regimen for findings of constipation on CT abdomen pelvis today.  He stated that the morning gastroenterology team will consult on the patient tomorrow morning for  further discussion of a procedure at that time.  He does not recommend n.p.o. status at midnight.   2102 Triage hospitalist paged   6523 Patient took home dose of buprenorphine      Procedures                Results for orders placed or performed during the hospital encounter of 07/23/24   CT Abdomen Pelvis w Contrast     Status: None    Narrative    EXAMINATION: CT ABDOMEN PELVIS W CONTRAST, 7/23/2024 7:56 PM    INDICATION: moderate to severe pain at g-tube site/ RUQ abdomen    COMPARISON STUDY: Fluoroscopic images 4/16/2024, CT abdomen and pelvis  11/8/2023    TECHNIQUE: CT scan of the abdomen and pelvis was performed on  multidetector CT scanner using volumetric acquisition technique and  images were reconstructed in multiple planes with variable thickness  and reviewed on dedicated workstations.     CONTRAST: iopamidol (ISOVUE-370) solution 77mL injected IV without  oral contrast    CT scan radiation dose is optimized to minimum requisite dose using  automated dose modulation techniques.    FINDINGS:    Lower thorax: Heart size is normal without pericardial effusion. Trace  basilar atelectasis. Calcified left lower lobe granuloma.    Liver: No mass. No intrahepatic biliary ductal dilation.    Biliary System: Cholecystectomy. No extrahepatic biliary ductal  dilation.    Pancreas: Surgically absent.    Adrenal glands: No mass or nodules    Spleen: Surgically absent.    Kidneys: No suspicious mass, obstructing calculus or hydronephrosis.    Gastrointestinal tract : Percutaneous gastrostomy tube is located  within the gastric lumen with no focal abnormality the gastrostomy  tract. Percutaneous jejunostomy in the left midabdomen with tip in the  jejunum. No abnormality of the jejunostomy tract. A few prominent  loops of small bowel at the small bowel anastomosis, similar compared  to prior. Small bowel is otherwise within normal limits. Large stool  burden within the colon with gaseous distention of the  rectum.  Appendectomy.    Mesentery/peritoneum/retroperitoneum: No mass. No free fluid or air.    Lymph nodes: No significant lymphadenopathy.    Vasculature: Patent major abdominal vasculature.    Pelvis: Urinary bladder is distended.  Prior hysterectomy.    Osseous structures: No aggressive or acute osseous lesion.  L2  vertebral body hemangioma, unchanged.      Soft tissues: Within normal limits.      Impression    IMPRESSION:   1. Normal appearance of the percutaneous gastrostomy and percutaneous  jejunostomy with appropriate positioning.  2. Large colonic stool burden, which may indicate constipation.    I have personally reviewed the examination and initial interpretation  and I agree with the findings.    CARINE BOYD DO         SYSTEM ID:  [id number]   Comprehensive metabolic panel     Status: Abnormal   Result Value Ref Range    Sodium 139 135 - 145 mmol/L    Potassium 4.8 3.4 - 5.3 mmol/L    Carbon Dioxide (CO2) 28 22 - 29 mmol/L    Anion Gap 9 7 - 15 mmol/L    Urea Nitrogen 21.1 (H) 6.0 - 20.0 mg/dL    Creatinine 0.62 0.51 - 0.95 mg/dL    GFR Estimate >90 >60 mL/min/1.73m2    Calcium 8.8 8.8 - 10.4 mg/dL    Chloride 102 98 - 107 mmol/L    Glucose 108 (H) 70 - 99 mg/dL    Alkaline Phosphatase 102 40 - 150 U/L    AST 25 0 - 45 U/L    ALT 23 0 - 50 U/L    Protein Total 6.8 6.4 - 8.3 g/dL    Albumin 3.9 3.5 - 5.2 g/dL    Bilirubin Total 0.4 <=1.2 mg/dL   UA with Microscopic reflex to Culture     Status: Abnormal    Specimen: Urine, Clean Catch   Result Value Ref Range    Color Urine Light Yellow Colorless, Straw, Light Yellow, Yellow    Appearance Urine Clear Clear    Glucose Urine Negative Negative mg/dL    Bilirubin Urine Negative Negative    Ketones Urine Negative Negative mg/dL    Specific Gravity Urine 1.009 1.003 - 1.035    Blood Urine Negative Negative    pH Urine 6.0 5.0 - 7.0    Protein Albumin Urine Negative Negative mg/dL    Urobilinogen Urine Normal Normal, 2.0 mg/dL    Nitrite Urine Negative  Negative    Leukocyte Esterase Urine Small (A) Negative    RBC Urine 0 <=2 /HPF    WBC Urine 11 (H) <=5 /HPF    Squamous Epithelials Urine 1 <=1 /HPF    Transitional Epithelials Urine <1 <=1 /HPF    Narrative    Urine Culture ordered based on laboratory criteria   Lactic acid whole blood with 1x repeat in 2 hr when >2     Status: Normal   Result Value Ref Range    Lactic Acid, Initial 0.9 0.7 - 2.0 mmol/L   CBC with platelets and differential     Status: None   Result Value Ref Range    WBC Count 7.8 4.0 - 11.0 10e3/uL    RBC Count 4.15 3.80 - 5.20 10e6/uL    Hemoglobin 12.6 11.7 - 15.7 g/dL    Hematocrit 39.8 35.0 - 47.0 %    MCV 96 78 - 100 fL    MCH 30.4 26.5 - 33.0 pg    MCHC 31.7 31.5 - 36.5 g/dL    RDW 13.7 10.0 - 15.0 %    Platelet Count 428 150 - 450 10e3/uL    % Neutrophils 42 %    % Lymphocytes 38 %    % Monocytes 12 %    % Eosinophils 7 %    % Basophils 1 %    % Immature Granulocytes 0 %    NRBCs per 100 WBC 0 <1 /100    Absolute Neutrophils 3.2 1.6 - 8.3 10e3/uL    Absolute Lymphocytes 3.0 0.8 - 5.3 10e3/uL    Absolute Monocytes 1.0 0.0 - 1.3 10e3/uL    Absolute Eosinophils 0.5 0.0 - 0.7 10e3/uL    Absolute Basophils 0.1 0.0 - 0.2 10e3/uL    Absolute Immature Granulocytes 0.0 <=0.4 10e3/uL    Absolute NRBCs 0.0 10e3/uL   CBC with platelets differential     Status: None    Narrative    The following orders were created for panel order CBC with platelets differential.  Procedure                               Abnormality         Status                     ---------                               -----------         ------                     CBC with platelets and d...[[id number]]                      Final result                 Please view results for these tests on the individual orders.     Medications   ketorolac (TORADOL) injection 15 mg (has no administration in time range)   promethazine (PHENERGAN) 25 mg in sodium chloride 0.9 % 55 mL intermittent infusion (has no administration in time range)    acetaminophen (TYLENOL) oral liquid 650 mg (has no administration in time range)   baclofen (LIORESAL) tablet 10-20 mg (has no administration in time range)   buprenorphine (SUBUTEX) sublingual tablet 2 mg (has no administration in time range)   cetirizine (zyrTEC) tablet 10 mg (has no administration in time range)   COMPOUNDED NON-CONTROLLED SUBSTANCE (CMPD RX) 1 enema (has no administration in time range)   COMPOUNDED NON-CONTROLLED SUBSTANCE (CMPD RX) 40 mg (has no administration in time range)   COMPOUNDED NON-CONTROLLED SUBSTANCE (CMPD RX) 60 mg (has no administration in time range)   diphenhydrAMINE (BENADRYL) elixir 25 mg (has no administration in time range)   droNABinol (SYNDROS) oral soln 2.5 mg (has no administration in time range)   DULoxetine HCl CSDR 60 mg (has no administration in time range)   estradiol (VAGIFEM) vaginal tablet 10 mcg (has no administration in time range)   famotidine (PEPCID) suspension 20 mg (has no administration in time range)   insulin detemir (LEVEMIR PEN) injection 7 Units (has no administration in time range)   levothyroxine (SYNTHROID) suspension 137 mcg (has no administration in time range)   lidocaine (LIDODERM) 5 % Patch 1 patch (has no administration in time range)   lipase-protease-amylase (CREON 24) 42627-30639-712633 units per EC capsule 3-4 capsule (has no administration in time range)   methocarbamol (ROBAXIN) tablet 750 mg (has no administration in time range)   prucalopride succinate (MOTEGRITY) tablet 2 mg (has no administration in time range)   naloxegol (MOVANTIK) tablet 25 mg (has no administration in time range)   pantoprazole (PROTONIX) IV push injection 40 mg (has no administration in time range)   prochlorperazine (COMPAZINE) tablet 10 mg (has no administration in time range)   prochlorperazine (COMPAZINE) suppository 25 mg (has no administration in time range)   promethazine (PHENERGAN) 25 mg in sodium chloride 0.9 % intermittent infusion (has no  administration in time range)   rimegepant (NURTEC) ODT tablet 75 mg (has no administration in time range)   scopolamine (TRANSDERM) 72 hr patch 1 patch (has no administration in time range)     And   scopolamine (TRANSDERM-SCOP) Patch in Place (has no administration in time range)   sennosides (SENOKOT) syrup 5 mL ( Per J Tube Automatically Held 7/26/24 2000)   ZOLMitriptan (ZOMIG-ZMT) ODT tab 5 mg (has no administration in time range)   lidocaine 1 % 0.1-1 mL (has no administration in time range)   lidocaine (LMX4) cream (has no administration in time range)   sodium chloride (PF) 0.9% PF flush 3 mL (has no administration in time range)   sodium chloride (PF) 0.9% PF flush 3 mL (has no administration in time range)   senna-docusate (SENOKOT-S/PERICOLACE) 8.6-50 MG per tablet 1 tablet (has no administration in time range)     Or   senna-docusate (SENOKOT-S/PERICOLACE) 8.6-50 MG per tablet 2 tablet (has no administration in time range)   calcium carbonate (TUMS) chewable tablet 1,000 mg (has no administration in time range)   Enema Compound (docusate/mineral oil/NaPhos) NO MAG CIT PREMIX (has no administration in time range)   HYDROmorphone (DILAUDID) tablet 2 mg (2 mg Oral $Given 7/23/24 1717)   sodium chloride (PF) 0.9% PF flush 69 mL (69 mLs Intravenous $Given 7/23/24 1945)   iopamidol (ISOVUE-370) solution 77 mL (77 mLs Intravenous $Given 7/23/24 1945)     Labs Ordered and Resulted from Time of ED Arrival to Time of ED Departure   COMPREHENSIVE METABOLIC PANEL - Abnormal       Result Value    Sodium 139      Potassium 4.8      Carbon Dioxide (CO2) 28      Anion Gap 9      Urea Nitrogen 21.1 (*)     Creatinine 0.62      GFR Estimate >90      Calcium 8.8      Chloride 102      Glucose 108 (*)     Alkaline Phosphatase 102      AST 25      ALT 23      Protein Total 6.8      Albumin 3.9      Bilirubin Total 0.4     ROUTINE UA WITH MICROSCOPIC REFLEX TO CULTURE - Abnormal    Color Urine Light Yellow      Appearance  Urine Clear      Glucose Urine Negative      Bilirubin Urine Negative      Ketones Urine Negative      Specific Gravity Urine 1.009      Blood Urine Negative      pH Urine 6.0      Protein Albumin Urine Negative      Urobilinogen Urine Normal      Nitrite Urine Negative      Leukocyte Esterase Urine Small (*)     RBC Urine 0      WBC Urine 11 (*)     Squamous Epithelials Urine 1      Transitional Epithelials Urine <1     LACTIC ACID WHOLE BLOOD WITH 1X REPEAT IN 2 HR WHEN >2 - Normal    Lactic Acid, Initial 0.9     CBC WITH PLATELETS AND DIFFERENTIAL    WBC Count 7.8      RBC Count 4.15      Hemoglobin 12.6      Hematocrit 39.8      MCV 96      MCH 30.4      MCHC 31.7      RDW 13.7      Platelet Count 428      % Neutrophils 42      % Lymphocytes 38      % Monocytes 12      % Eosinophils 7      % Basophils 1      % Immature Granulocytes 0      NRBCs per 100 WBC 0      Absolute Neutrophils 3.2      Absolute Lymphocytes 3.0      Absolute Monocytes 1.0      Absolute Eosinophils 0.5      Absolute Basophils 0.1      Absolute Immature Granulocytes 0.0      Absolute NRBCs 0.0     URINE CULTURE     CT Abdomen Pelvis w Contrast   Final Result   IMPRESSION:    1. Normal appearance of the percutaneous gastrostomy and percutaneous   jejunostomy with appropriate positioning.   2. Large colonic stool burden, which may indicate constipation.      I have personally reviewed the examination and initial interpretation   and I agree with the findings.      CARINE BOYD DO            SYSTEM ID:  [id number]             Critical care was not performed.     Medical Decision Making  The patient's presentation was of high complexity (an acute health issue posing potential threat to life or bodily function).    The patient's evaluation involved:  review of external note(s) from 2 sources (telephone encounters, outpatient GI visits)  review of 1 test result(s) ordered prior to this encounter (previous imaging results)  ordering and/or review of  3+ test(s) in this encounter (see separate area of note for details)  discussion of management or test interpretation with another health professional (gastroenterology)    The patient's management necessitated high risk (a decision regarding hospitalization).    Assessment & Plan    Dinora is a 59-year-old female that presented to the ED with complaints of moderate severe abdominal pain around her G-tube site.  Acceptable vital signs on presentation without tachycardia, fever, hypotension.  Resting comfortably while sitting but pain is severely exacerbated with ambulation and it movement as well as light touch around the G-tube site.  Lactic acid 0.9.  UA negative for signs of infection.  Labs otherwise unremarkable and within normal limits.  CT abdomen and pelvis notable for large colonic stool burden but otherwise normal appearance of the percutaneous gastrostomy and percutaneous jejunostomy with appropriate positioning.  P.o. Dilaudid administered with minimal improvement of symptoms.  IV pain medication was unable to be administered due to patient's ED visit stay in the waiting room.  IV Toradol and patient's home dose of IV promethazine also given for symptoms.  Discussed CT findings and recommendations per telephone encounters from Dr. Park's office with the on-call gastroenterology fellow.  He recommended medicine admission for beginning of a bowel cleanout and regimen with gastroenterology consult tomorrow morning for evaluation.  He did not recommend n.p.o. status at midnight stating that he no procedure would be planned at this time.  Discussed lab and imaging results with the patient at length as well as discussion with gastroenterology.  Patient voiced understanding of the results and admission plan.  Patient was agreeable to the admission plan and all questions were answered prior to transfer of care to the general medicine service.    I have reviewed the nursing notes. I have reviewed the findings,  diagnosis, plan and need for follow up with the patient.    New Prescriptions    No medications on file       Final diagnoses:   Constipation   Right upper quadrant abdominal pain - g tube site   S/P pancreatic islet cell transplantation (H)   On total parenteral nutrition (TPN)       Yoselyn Luna PA-C    [facility] EMERGENCY DEPARTMENT  7/23/2024     Yoselyn Luna PA-C  07/23/24 2251  -------------------------------------------------------------------------------------------------  --    ED Attending Physician Attestation    I Gia Spence MD, cared for this patient with the Advanced Practice Provider ([facility]). I personally provided a substantive portion of the care for this patient, including approving the care plan for the number and complexity of problems addressed and taking responsibility related to the risk of complications and/or morbidity or mortality of patient management. Please see the [facility]'s documentation for full details.    Summary of HPI, PE, ED Course   Patient is a 59 year old female evaluated in the emergency department for chronic abdominal pain, chronic intestinal failure/dysmotility, current G-tube and J-tube in place, who presents to the emergency department today with pain around her G-tube site.  She had been discussing her symptoms with her GI clinic and was advised to come to the emergency department, there are some notes in Epic which may indicate that GI may have wanted to change the G-tube in the OR.  We did evaluate her in the emergency department, abdominal CT scan does not show any issue with the G-tube itself, but does show significant constipation.  After discussion with GI fellow, plan will be to admit to the hospital for aggressive bowel regimen.  GI team can see her in the morning to decide whether or not G-tube needs to be changed/adjusted.  She has an appointment next week at [facility] for a second opinion with regard to her severe chronic  dysmotility for which she has been advised to have a colectomy. After the completion of care in the emergency department, the patient was admitted to inpatient.      Gia Spence MD  Emergency Medicine        Gia Spence MD  07/23/24 1088

## 2024-07-23 NOTE — ED TRIAGE NOTES
Arrives ambulatory with pain at the [facility] site. Per patient she had it exchanged last Thursday.     Hx abscess at [facility] site     Triage Assessment (Adult)       Row Name 07/23/24 8905          Triage Assessment    Airway WDL WDL        Respiratory WDL    Respiratory WDL WDL        Cardiac WDL    Cardiac WDL WDL        Peripheral/Neurovascular WDL    Peripheral Neurovascular WDL WDL        Cognitive/Neuro/Behavioral WDL    Cognitive/Neuro/Behavioral WDL WDL

## 2024-07-23 NOTE — TELEPHONE ENCOUNTER
Per Dr. Park related to tube pain:    OR sounds best. Can look at both tubes 30 minutes     Patient will be at [facility] next week, cannot come for outpatient procedure next week. May present to ER for assessment. Message sent to inpatient team    ML

## 2024-07-23 NOTE — TELEPHONE ENCOUNTER
COMPRO 25 MG suppository 10 suppository 1 5/15/2024     Last Office Visit: 6/5/24  Future Office visit:   9/25/24    Antivertigo/Antiemetic Agents Passed     Monisha Hernandez RN  P Red Flag Triage/MRT

## 2024-07-23 NOTE — TELEPHONE ENCOUNTER
Per Ashley FRITZ RN: Fax following records to [facility] at  [phone]     Med list     CT from 2-15-24   X-ray from 3-8     Labs-   Phosphorus   Magnesium   CMP   Bili   CBC   ALT   AST   BUN   Crit with GFR   Albumin   Calcium   Alk Phos   Triglycerides   Bili total and direct     Faxed on 7/23 at 1:05pm.     RN Notified    Yolanda Schmidt

## 2024-07-24 ENCOUNTER — APPOINTMENT (OUTPATIENT)
Dept: GENERAL RADIOLOGY | Facility: CLINIC | Age: 59
End: 2024-07-24
Attending: DIETITIAN, REGISTERED
Payer: COMMERCIAL

## 2024-07-24 LAB
ALBUMIN SERPL BCG-MCNC: 3.4 G/DL (ref 3.5–5.2)
ALP SERPL-CCNC: 93 U/L (ref 40–150)
ALT SERPL W P-5'-P-CCNC: 20 U/L (ref 0–50)
ANION GAP SERPL CALCULATED.3IONS-SCNC: 8 MMOL/L (ref 7–15)
AST SERPL W P-5'-P-CCNC: 20 U/L (ref 0–45)
BACTERIA UR CULT: NORMAL
BASOPHILS # BLD AUTO: 0.1 10E3/UL (ref 0–0.2)
BASOPHILS NFR BLD AUTO: 1 %
BILIRUB SERPL-MCNC: 0.2 MG/DL
BUN SERPL-MCNC: 21.8 MG/DL (ref 6–20)
CALCIUM SERPL-MCNC: 8.3 MG/DL (ref 8.8–10.4)
CHLORIDE SERPL-SCNC: 101 MMOL/L (ref 98–107)
CREAT SERPL-MCNC: 0.59 MG/DL (ref 0.51–0.95)
EGFRCR SERPLBLD CKD-EPI 2021: >90 ML/MIN/1.73M2
EOSINOPHIL # BLD AUTO: 0.6 10E3/UL (ref 0–0.7)
EOSINOPHIL NFR BLD AUTO: 10 %
ERYTHROCYTE [DISTWIDTH] IN BLOOD BY AUTOMATED COUNT: 13.7 % (ref 10–15)
GLUCOSE BLDC GLUCOMTR-MCNC: 100 MG/DL (ref 70–99)
GLUCOSE BLDC GLUCOMTR-MCNC: 104 MG/DL (ref 70–99)
GLUCOSE BLDC GLUCOMTR-MCNC: 105 MG/DL (ref 70–99)
GLUCOSE BLDC GLUCOMTR-MCNC: 111 MG/DL (ref 70–99)
GLUCOSE BLDC GLUCOMTR-MCNC: 131 MG/DL (ref 70–99)
GLUCOSE BLDC GLUCOMTR-MCNC: 136 MG/DL (ref 70–99)
GLUCOSE BLDC GLUCOMTR-MCNC: 93 MG/DL (ref 70–99)
GLUCOSE SERPL-MCNC: 130 MG/DL (ref 70–99)
HBA1C MFR BLD: 5.4 %
HCO3 SERPL-SCNC: 28 MMOL/L (ref 22–29)
HCT VFR BLD AUTO: 36.6 % (ref 35–47)
HGB BLD-MCNC: 12 G/DL (ref 11.7–15.7)
IMM GRANULOCYTES # BLD: 0 10E3/UL
IMM GRANULOCYTES NFR BLD: 0 %
LYMPHOCYTES # BLD AUTO: 2.4 10E3/UL (ref 0.8–5.3)
LYMPHOCYTES NFR BLD AUTO: 36 %
MCH RBC QN AUTO: 31.1 PG (ref 26.5–33)
MCHC RBC AUTO-ENTMCNC: 32.8 G/DL (ref 31.5–36.5)
MCV RBC AUTO: 95 FL (ref 78–100)
MONOCYTES # BLD AUTO: 0.9 10E3/UL (ref 0–1.3)
MONOCYTES NFR BLD AUTO: 13 %
NEUTROPHILS # BLD AUTO: 2.6 10E3/UL (ref 1.6–8.3)
NEUTROPHILS NFR BLD AUTO: 40 %
NRBC # BLD AUTO: 0 10E3/UL
NRBC BLD AUTO-RTO: 0 /100
PLATELET # BLD AUTO: 404 10E3/UL (ref 150–450)
POTASSIUM SERPL-SCNC: 4.6 MMOL/L (ref 3.4–5.3)
PROT SERPL-MCNC: 6.1 G/DL (ref 6.4–8.3)
RBC # BLD AUTO: 3.86 10E6/UL (ref 3.8–5.2)
SODIUM SERPL-SCNC: 137 MMOL/L (ref 135–145)
UPPER GI ENDOSCOPY: NORMAL
WBC # BLD AUTO: 6.6 10E3/UL (ref 4–11)

## 2024-07-24 PROCEDURE — 99232 SBSQ HOSP IP/OBS MODERATE 35: CPT | Mod: FS | Performed by: STUDENT IN AN ORGANIZED HEALTH CARE EDUCATION/TRAINING PROGRAM

## 2024-07-24 PROCEDURE — 250N000012 HC RX MED GY IP 250 OP 636 PS 637: Performed by: INTERNAL MEDICINE

## 2024-07-24 PROCEDURE — 258N000003 HC RX IP 258 OP 636

## 2024-07-24 PROCEDURE — 74283 THER NMA RDCTJ INTUS/OBSTRCJ: CPT | Mod: 26 | Performed by: RADIOLOGY

## 2024-07-24 PROCEDURE — 85025 COMPLETE CBC W/AUTO DIFF WBC: CPT

## 2024-07-24 PROCEDURE — 250N000011 HC RX IP 250 OP 636: Performed by: INTERNAL MEDICINE

## 2024-07-24 PROCEDURE — 250N000012 HC RX MED GY IP 250 OP 636 PS 637

## 2024-07-24 PROCEDURE — 250N000009 HC RX 250: Performed by: STUDENT IN AN ORGANIZED HEALTH CARE EDUCATION/TRAINING PROGRAM

## 2024-07-24 PROCEDURE — 999N000111 HC STATISTIC OT IP EVAL DEFER: Performed by: OCCUPATIONAL THERAPIST

## 2024-07-24 PROCEDURE — 82962 GLUCOSE BLOOD TEST: CPT

## 2024-07-24 PROCEDURE — 99418 PROLNG IP/OBS E/M EA 15 MIN: CPT | Performed by: DIETITIAN, REGISTERED

## 2024-07-24 PROCEDURE — 250N000013 HC RX MED GY IP 250 OP 250 PS 637

## 2024-07-24 PROCEDURE — 43235 EGD DIAGNOSTIC BRUSH WASH: CPT | Performed by: INTERNAL MEDICINE

## 2024-07-24 PROCEDURE — 36415 COLL VENOUS BLD VENIPUNCTURE: CPT

## 2024-07-24 PROCEDURE — 120N000002 HC R&B MED SURG/OB UMMC

## 2024-07-24 PROCEDURE — 999N000147 HC STATISTIC PT IP EVAL DEFER

## 2024-07-24 PROCEDURE — 250N000011 HC RX IP 250 OP 636

## 2024-07-24 PROCEDURE — 99255 IP/OBS CONSLTJ NEW/EST HI 80: CPT | Mod: 25 | Performed by: DIETITIAN, REGISTERED

## 2024-07-24 PROCEDURE — 250N000009 HC RX 250: Performed by: INTERNAL MEDICINE

## 2024-07-24 PROCEDURE — 250N000009 HC RX 250

## 2024-07-24 PROCEDURE — 3E0436Z INTRODUCTION OF NUTRITIONAL SUBSTANCE INTO CENTRAL VEIN, PERCUTANEOUS APPROACH: ICD-10-PCS | Performed by: DIETITIAN, REGISTERED

## 2024-07-24 PROCEDURE — 36592 COLLECT BLOOD FROM PICC: CPT

## 2024-07-24 PROCEDURE — 999N000099 HC STATISTIC MODERATE SEDATION < 10 MIN: Performed by: INTERNAL MEDICINE

## 2024-07-24 PROCEDURE — 80053 COMPREHEN METABOLIC PANEL: CPT

## 2024-07-24 PROCEDURE — 74283 THER NMA RDCTJ INTUS/OBSTRCJ: CPT

## 2024-07-24 PROCEDURE — 0DW08UZ REVISION OF FEEDING DEVICE IN UPPER INTESTINAL TRACT, VIA NATURAL OR ARTIFICIAL OPENING ENDOSCOPIC: ICD-10-PCS | Performed by: INTERNAL MEDICINE

## 2024-07-24 RX ORDER — DEXTROSE MONOHYDRATE 100 MG/ML
INJECTION, SOLUTION INTRAVENOUS CONTINUOUS PRN
Status: DISCONTINUED | OUTPATIENT
Start: 2024-07-24 | End: 2024-07-25 | Stop reason: HOSPADM

## 2024-07-24 RX ORDER — AMINO ACIDS/PROTEIN HYDROLYS 11G-40/45
1 LIQUID IN PACKET (ML) ORAL DAILY
Status: DISCONTINUED | OUTPATIENT
Start: 2024-07-24 | End: 2024-07-25 | Stop reason: HOSPADM

## 2024-07-24 RX ORDER — HYDROMORPHONE HYDROCHLORIDE 1 MG/ML
0.5 INJECTION, SOLUTION INTRAMUSCULAR; INTRAVENOUS; SUBCUTANEOUS
Status: COMPLETED | OUTPATIENT
Start: 2024-07-24 | End: 2024-07-25

## 2024-07-24 RX ORDER — FLUCONAZOLE 40 MG/ML
5 POWDER, FOR SUSPENSION ORAL EVERY 24 HOURS
COMMUNITY
End: 2024-09-09

## 2024-07-24 RX ORDER — FENTANYL CITRATE 50 UG/ML
INJECTION, SOLUTION INTRAMUSCULAR; INTRAVENOUS PRN
Status: DISCONTINUED | OUTPATIENT
Start: 2024-07-24 | End: 2024-07-24 | Stop reason: HOSPADM

## 2024-07-24 RX ORDER — NALOXONE HYDROCHLORIDE 0.4 MG/ML
0.4 INJECTION, SOLUTION INTRAMUSCULAR; INTRAVENOUS; SUBCUTANEOUS
Status: DISCONTINUED | OUTPATIENT
Start: 2024-07-24 | End: 2024-07-25 | Stop reason: HOSPADM

## 2024-07-24 RX ORDER — PROMETHAZINE HYDROCHLORIDE AND PHENYLEPHRINE HYDROCHLORIDE 6.25; 5 MG/5ML; MG/5ML
5 SYRUP ORAL EVERY 6 HOURS
COMMUNITY

## 2024-07-24 RX ORDER — LIDOCAINE 40 MG/G
CREAM TOPICAL
Status: CANCELLED | OUTPATIENT
Start: 2024-07-24

## 2024-07-24 RX ORDER — NALOXONE HYDROCHLORIDE 0.4 MG/ML
0.2 INJECTION, SOLUTION INTRAMUSCULAR; INTRAVENOUS; SUBCUTANEOUS
Status: DISCONTINUED | OUTPATIENT
Start: 2024-07-24 | End: 2024-07-25 | Stop reason: HOSPADM

## 2024-07-24 RX ORDER — IBUPROFEN 400 MG/1
TABLET, FILM COATED ORAL
COMMUNITY

## 2024-07-24 RX ORDER — MONTELUKAST SODIUM 10 MG/1
TABLET ORAL EVERY 24 HOURS
COMMUNITY

## 2024-07-24 RX ORDER — SODIUM BICARBONATE 325 MG/1
325 TABLET ORAL 4 TIMES DAILY
Status: DISCONTINUED | OUTPATIENT
Start: 2024-07-24 | End: 2024-07-25

## 2024-07-24 RX ADMIN — BACLOFEN 20 MG: 10 TABLET ORAL at 08:42

## 2024-07-24 RX ADMIN — HYDROMORPHONE HYDROCHLORIDE 0.5 MG: 1 INJECTION, SOLUTION INTRAMUSCULAR; INTRAVENOUS; SUBCUTANEOUS at 21:56

## 2024-07-24 RX ADMIN — METHOCARBAMOL 750 MG: 750 TABLET ORAL at 18:42

## 2024-07-24 RX ADMIN — FAMOTIDINE 20 MG: 40 POWDER, FOR SUSPENSION ORAL at 20:19

## 2024-07-24 RX ADMIN — Medication 60 MG: at 21:19

## 2024-07-24 RX ADMIN — Medication 60 MG: at 20:58

## 2024-07-24 RX ADMIN — PANTOPRAZOLE SODIUM 40 MG: 40 INJECTION, POWDER, FOR SOLUTION INTRAVENOUS at 08:30

## 2024-07-24 RX ADMIN — CETIRIZINE HYDROCHLORIDE 10 MG: 10 TABLET, FILM COATED ORAL at 20:20

## 2024-07-24 RX ADMIN — BUPRENORPHINE 2 MG: 2 TABLET SUBLINGUAL at 20:59

## 2024-07-24 RX ADMIN — BUPRENORPHINE 2 MG: 2 TABLET SUBLINGUAL at 08:41

## 2024-07-24 RX ADMIN — Medication 60 MG: at 00:40

## 2024-07-24 RX ADMIN — CETIRIZINE HYDROCHLORIDE 10 MG: 10 TABLET, FILM COATED ORAL at 00:40

## 2024-07-24 RX ADMIN — PROMETHAZINE HYDROCHLORIDE 25 MG: 25 INJECTION INTRAMUSCULAR; INTRAVENOUS at 08:59

## 2024-07-24 RX ADMIN — BACLOFEN 20 MG: 10 TABLET ORAL at 22:58

## 2024-07-24 RX ADMIN — INSULIN DETEMIR 3 UNITS: 100 INJECTION, SOLUTION SUBCUTANEOUS at 23:01

## 2024-07-24 RX ADMIN — SCOPALAMINE 1 PATCH: 1 PATCH, EXTENDED RELEASE TRANSDERMAL at 12:43

## 2024-07-24 RX ADMIN — ACETAMINOPHEN 650 MG: 325 SOLUTION ORAL at 10:05

## 2024-07-24 RX ADMIN — DRONABINOL 2.5 MG: 5 SOLUTION ORAL at 21:19

## 2024-07-24 RX ADMIN — LIDOCAINE 1 PATCH: 4 PATCH TOPICAL at 08:35

## 2024-07-24 RX ADMIN — DRONABINOL 2.5 MG: 5 SOLUTION ORAL at 12:45

## 2024-07-24 RX ADMIN — METHOCARBAMOL 750 MG: 750 TABLET ORAL at 04:42

## 2024-07-24 RX ADMIN — PROMETHAZINE HYDROCHLORIDE 25 MG: 25 INJECTION INTRAMUSCULAR; INTRAVENOUS at 20:12

## 2024-07-24 RX ADMIN — ACETAMINOPHEN 650 MG: 325 SOLUTION ORAL at 18:40

## 2024-07-24 RX ADMIN — DIATRIZOATE MEGLUMINE AND DIATRIZOATE SODIUM 600 ML: 660; 100 SOLUTION ORAL; RECTAL at 21:07

## 2024-07-24 RX ADMIN — PROCHLORPERAZINE MALEATE 10 MG: 10 TABLET ORAL at 05:13

## 2024-07-24 RX ADMIN — ANTACID TABLETS 1000 MG: 500 TABLET, CHEWABLE ORAL at 08:41

## 2024-07-24 RX ADMIN — MAGNESIUM SULFATE HEPTAHYDRATE: 500 INJECTION, SOLUTION INTRAMUSCULAR; INTRAVENOUS at 20:21

## 2024-07-24 RX ADMIN — HYDROMORPHONE HYDROCHLORIDE 0.5 MG: 1 INJECTION, SOLUTION INTRAMUSCULAR; INTRAVENOUS; SUBCUTANEOUS at 12:37

## 2024-07-24 RX ADMIN — Medication 60 MG: at 00:41

## 2024-07-24 ASSESSMENT — ACTIVITIES OF DAILY LIVING (ADL)
ADLS_ACUITY_SCORE: 39
ADLS_ACUITY_SCORE: 40
ADLS_ACUITY_SCORE: 39
ADLS_ACUITY_SCORE: 39
ADLS_ACUITY_SCORE: 40
ADLS_ACUITY_SCORE: 39
ADLS_ACUITY_SCORE: 40
ADLS_ACUITY_SCORE: 39
ADLS_ACUITY_SCORE: 40
ADLS_ACUITY_SCORE: 39
ADLS_ACUITY_SCORE: 40
ADLS_ACUITY_SCORE: 39
ADLS_ACUITY_SCORE: 40

## 2024-07-24 NOTE — DISCHARGE INSTRUCTIONS
"Gastroenterology Discharge Instructions:  -Monitor the number marking on your PEG/Gtube daily.  If the tube is no longer exiting your skin at the ~3 cm marker on the tube (or the top of the external bumper is not at 4 cm marker on the tube) and you are having increased leakage around the tube site or worsening abdominal pain again, please reach out to your GI RNCC (Kenya Victor) at [phone].   - Resume your bowel medication regimen as previously instructed by Dr. Genao and follow up with her as previously scheduled on 9/25/2024.  -If you develop any of the following, please contact your GI RN[facility] or if after hours call [phone] to speak with a triage nurse:  Fevers over 101, +/- chills.  Significant nausea/vomiting causing inability to take in fluids depleting hydration.  Severe pain, ongoing symptoms (pain, nausea) that cannot be controlled with prescribed or over the counter medications.  Signs of dehydration (pale skin, low blood pressure, lightheadedness, dark urine, \"sticky\" skin when pinched, rapid heart rate, little to no urine output).   "

## 2024-07-24 NOTE — CONSULTS
"  Gastroenterology Consultation      Date of Admission:  7/23/2024  Reason for Admission: Abdominal pain  Date of Consult  7/24/2024   Requesting Physician:  Peewee Stanley MD           ASSESSMENT AND RECOMMENDATIONS:   Assessment:  Dinora Mcghee is a 59 year old female well known to both the luminal and panc/bili GI services due to PMH including Schatzki's ring, chronic pancreatitis due to SOD vs gallstones, s/p pineda, s/p TPAIT with splenectomy and GJ tube (12/2016 by Nestor Phoenix), s/p lap incisional hernia repair (8/2018), DM and severe gastroparesis with chronic N/V and malnutrition with hx of TPN, TF dependence (initially had GJ but due to frequent retraction of Jtube, GI converted initial site to Gtube and placed separate Jtube on 11/28/2021, most recently exchanged both tubes on 4/2024, on nocturnal cycle with IVFs/IV antiemetics PRN), chronic constipation and hx of klebsiella bacteremia (6/23/23 and 10/9/23 and port removed 11/9/2023, replaced Ann 4/15/24).  Now admitted 7/23/2024 abdominal pain for which GI team has been consulted to evaluate.     # Acute on chronic abdominal pain  # Chronic constipation  # Severe protein malnutrition - S/p PEG and PEJ, TF and TPN dependent  Presents with worsening \"stabbing\" pain around her PEG site since ~7/14 but denies fever/chills/N/V > her baseline issues. Had home care nurse do an exchange of PEG at beside on 7/18 and put in [employer] 18 Fr tube with 20 mL balloon.  Since this done, pain has not improved and persisted/worsened.  Has upcoming trip to [facility] for referral to their motility clinic for her chronic constipation but LBM on 7/21 and unable to administer home enema regimen with repositioning due to pain with movement.  CT AP (7/23) and per personal review, noted to have large stool burden throughout colon, PEG and PEJ each appearing in good position within lumen, no concern for buried bumper but PEG balloon does appear somewhat larger. " " However, upon interrogation at bedside only ~12 mL in balloon (of 20 mL volume balloon) and bumper at 5.5 cm marker (versus 2.5 cm marker on last replacement 4/2024) and question if stabbing pain could be r/t inward migration of more recently replaced tube.  Upon attempted retraction of PEG, easily retracted to 4 cm marker but then pt with c/o increased pain so aborted any further attempts. Question if patient with any internal/gastric wall abnormality at PEG site and would proceed with EGD to further evaluated prior to further attempts at removal/exchange of PEG.      Patient follows with Dr. Genao for chronic constipation and has also seen CRS and considering possible colectomy but patient seeking referral from [facility] first.  Additionally would proceed with GGE (bedside) as previously rec by Dr. Genao to help clear stool burden.    RECOMMENDATIONS:  -Interrogated 18 Fr Gtube with 20 mL balloon - only with ~12 ml total.  Retracted PEG tube back to 4 cm marker initially and tube still loose and lightly touching the skin, re-inflated balloon with 11 mL. No acute pain upon re-examination 1 hour later.  -Changed to NPO, hold restart of TF.  -Plan for EGD today (7/24 ~14:00) to evaluate for etiology of pain with PEG, likely exchange of PEG (with smaller balloon).  -Post procedure, complete Gastrografin Enema (GGE) at bedside (ordered for you).  -Instructions/comments: \"Please send Gastrografin Enema to the floor to be administered by the bedside RN; AXR to be complete after GGE administration and positional changes.\"  - Patient needs to lay on their left side (left lateral positioning) for administration of the GGE.   - Bedside RN to administer Gastrografin Enema with patient retaining the enema for 30-45 minutes total with the following positional changes:   - Remain laying on their left side for the first 10-15 minutes.  - Then roll to their back and lay for 10-15 minutes.  - Then roll to right side " (right lateral position) and lay for 10-15 minutes.   - After the righte-side position, then an AXR needs to be taken to ensure Gastrografin made it to the cecum.   - If GGE made it to the cecum, okay for patient to attempt bowel movement at this time.   - If the GGE did not make it to the cecum, please call radiology and request they perform an XR Gastrografin Enema in radiology.   -Analgesia/Antiemetics per primary team.  -RD consulted, appreciate recs and orders to resume TF and PERT via Jtube post-procedure and once pain improves.  -RD/Pharmacy consulted to resume TPN.  -Post GGE, resume PTA bowel regimen:    -Amtriptylline 60 m q HS.  -Pink lady enemas PRN (per pt administers every other day)   -Senna 8.8 mg BID.   -Miralax 17 gm BID via Jtube.   -Pelvic floor exercises per PT.    Discussed with primary medicine team (ANA Novak)    Gastroenterology follow up recommendations:   -TBD pending post EGD/procedure recs.  -Proceed with [facility] second opinion next week.  -Follow up with General GI (Dr. Genao) as scheduled on 9/25/24.    Thank you for involving us in this patient's care. Please do not hesitate to contact the GI service with any questions or concerns.     Pt seen and care plan discussed with Dr. Park and Dr. Montoya, GI staff physician.    Overall time spent on the date of this encounter preparing to see the patient (including chart review of available notes, clinical status events, imaging and labs); obtaining and/or reviewing separately obtained history ; ordering medications, tests or procedures; communicating with other health care professionals; and documenting the above clinical information in the electronic medical record was 120 minutes.      Antoinette Araujo PA-C, RD, [facility]  Inpatient Gastroenterology Service  [facility]  Pager: *[phone]  Text Page    "  -------------------------------------------------------------------------------------------------------------------       Reason for Consultation:   GI Luminal consult:  Dr. Park requesting admission to address possible issues w/ GJ tube; presented and admitted for severe abd pain         History of Present Illness:   Dinora Mcghee is a 59 year old female well known to both the luminal and panc/bili GI services due to PMH including Vera-en-Y gastric bypass, chronic pancreatitis due to gallstones, s/p pineda, s/p TPAIT with splenectomy and Vera-Y reconstruction (choledochoduodenostomy/duodenojejunostomy) in 12/2016 c/b DM, severe gastroparesis with chronic N/V and malnutrition with hx of TPN, TF dependence (initially had PEG-J but due to frequent retraction of Jtube, converted initial site to PEG and placed separate PEJ on 11/28/2021, most recently exchanged both tubes on 4/2024, on nocturnal cycle with IVFs/IV antiemetics PRN) and hx of klebsiella bacteremia (6/23/23 and 10/9/23 and port removed 11/9/2023, replaced Ann 4/15/24).  Now admitted 7/23/2024 abdominal pain for which GI team has been consulted to evaluate.    Patient seen and examined at 9:30. History is obtained from patient who reports onset of acute \"stabbing\" pain around her PEG site since ~7/14 that has been progressively worsening but denies fever/chills/N/V > her baseline issues.  Worse with movement.  Has been in communication with Dr. Park's GI [facility] ([place]) and had home care nurse try an exchange of PEG at beside on 7/18.  Per current tube in abd, put in [employer] 18 Fr tube with 20 mL balloon (previously had Spectafy KISHORE 18 Fr/7-10 mL balloon inflated to 7 mL per below).  Since this was done, pain has not improved and persisted/worsened.  Has upcoming trip to [facility] (scheduled to leave on Tues 7/30) for referral to their motility clinic on referral from [facility] Luminal GI provider (Dr. Hilary Genao) for her chronic " "constipation but does not feel she is able to travel with this pain.  Additionally, has been unable to do her usual \"clean out\" bowel med regimen of pink lady enema with repositioning (usually performs every other day) due to this pain and reports LBM on 7/21.  Reports has had GGE in past and when does has \"shutter poops\" which includes constellation of sx of nausea, diaphoretic after so requests administration of antiemetics prior to usually.  Has been taking her usual regimen of x3 dulcolax, senna, Miralax (2 capfuls daily) via Jtube and infusing TF via PEJ (notes is unable to tolerate \"higher\" rates much >65 with goal 120 ml/hr x 12 hrs/day but frequently not able to tolerate this and infusing at rates she tolerates for time period as much as she can).  Minimal po (usually eats/spits out food).  Restarted TPN ~3 weeks ago to supplement TF given ongoing weight losses.  Patient denies any c/o in regards to her PEJ site.     No PO consumed today.  Last received TF (via PEJ) at 6:00 AM.    Previous Procedures:  4/16/2024: EGD with Dr. Dodson  Findings:       There was evidence of an intact gastrostomy with a patent G-tube present        in the gastric antrum. The PEG required removal because it had been in        place for an extended length of time. The PEG was removed        percutaneously. Removal was easily accomplished. An externally removable        18 Fr Avanos KISHORE gastrostomy tube was lubricated and advanced across the        gastrostomy tract. The internal balloon was inflated with 7 mL of        saline. The final position of the gastrostomy tube was confirmed by        fluoroscopy, and skin marking noted to be 2.5 cm at the external bumper.        The final tension and compression of the abdominal wall by the PEG tube        and external bumper were checked and revealed that the bumper was loose        and lightly touching the skin. The tube was capped, and the tube site        was cleaned and dressed.       "  The PEJ required removal because it was balloon was broken. A glidewire        was advanced through the old PEJ tube and allowed to curl in the        jejunum. The PEJ was removed percutaneously over the wire. Removal was        easily accomplished. An externally removable 14 Fr Avanos KISHORE jejunal        tube was cut to about 20 cm in length and lubricated. The PEJ tube was        advanced over the wire under fluoroscopic guidance into the jejunum. The        internal balloon was then inflated with 5 mL of saline. The final        position of the jejunostomy tube was confirmed by fluoroscopy by        injecting contrast, and skin marking noted to be 4 cm at the external        bumper. The final tension and compression of the abdominal wall by the        PEJ tube and external bumper were checked and revealed that the bumper        was loose and lightly touching the skin. The tube was capped, and the        tube site was cleaned and dressed.   Impression:    - PEG tube exchanged for a new 18 Fr gastrostomy tube                          - PEJ tube exchanged over a wire with a new 14 Fr PEJ                          tube cut to 20 cm                          - No specimens collected.             Past Medical History:   Reviewed and edited as appropriate  Past Medical History:   Diagnosis Date    Allergic rhinitis, cause unspecified     Allergy to other foods     strawberries, apples, celeries, alice, watermelon    Arthritis     left knee    Choledocholithiasis     long after cholecystectomy    Chronic abdominal pain     Chronic constipation     Chronic nausea     Chronic pancreatitis (H)     Degeneration of lumbar or lumbosacral intervertebral disc     Esophageal reflux     w/ hiatal hernia    Gastroparesis     Hiatal hernia     History of pituitary adenoma     s/p resection    Hypothyroidism     Migraines     Mild hyperlipidemia     On tube feeding diet     presence of GJ tube    Pancreatic disease     PD stricture,  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 endometriosis growth    APPENDECTOMY       SECTION  1993    COLONOSCOPY  2014    COLONOSCOPY N/A 2023    Procedure: COLONOSCOPY, WITH POLYPECTOMY AND BIOPSY;  Surgeon: Percy Chamorro MD;  [facility]    ENDOSCOPIC INSERTION TUBE GASTROSTOMY N/A 2021    Procedure: Gastrojejonostomy placement with jejunopexy, PEG tube exchange;  Surgeon: Zackery Montoya MD;  [facility]    ENDOSCOPIC RETROGRADE CHOLANGIOPANCREATOGRAM N/A 2015    Procedure: ENDOSCOPIC RETROGRADE CHOLANGIOPANCREATOGRAM;  Surgeon: Mandeep Park MD;  [facility]    ENDOSCOPIC RETROGRADE CHOLANGIOPANCREATOGRAM N/A 2016    Procedure: COMBINED ENDOSCOPIC RETROGRADE CHOLANGIOPANCREATOGRAPHY, PLACE TUBE/STENT;  Surgeon: Mandeep Park MD;  [facility]    ENDOSCOPIC RETROGRADE CHOLANGIOPANCREATOGRAM N/A 2016    Procedure: COMBINED ENDOSCOPIC RETROGRADE CHOLANGIOPANCREATOGRAPHY, REMOVE FOREIGN BODY OR STENT/TUBE;  Surgeon: Mandeep Park MD;  [facility]    ENDOSCOPIC RETROGRADE CHOLANGIOPANCREATOGRAM N/A 2016    Procedure: COMBINED ENDOSCOPIC RETROGRADE CHOLANGIOPANCREATOGRAPHY, PLACE TUBE/STENT;  Surgeon: Mandeep Park MD;  [facility]    ENDOSCOPIC RETROGRADE CHOLANGIOPANCREATOGRAM N/A 2016    Procedure: COMBINED ENDOSCOPIC RETROGRADE CHOLANGIOPANCREATOGRAPHY, REMOVE FOREIGN BODY OR STENT/TUBE;  Surgeon: Mandeep Park MD;  [facility]    ENDOSCOPIC ULTRASOUND UPPER GASTROINTESTINAL TRACT (GI) N/A 10/03/2016     Procedure: ENDOSCOPIC ULTRASOUND, ESOPHAGOSCOPY / UPPER GASTROINTESTINAL TRACT (GI);  Surgeon: Guru Jose Rodas MD;  [facility]    ENT SURGERY  1989    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    ENTEROSCOPY SMALL BOWEL N/A 02/03/2022    Procedure: ENTEROSCOPY with possible fistula closure;  Surgeon: Francisco Dodson MD;  Location:  GI    ESOPHAGOSCOPY, GASTROSCOPY, DUODENOSCOPY (EGD), COMBINED N/A 06/24/2015    Procedure: COMBINED ESOPHAGOSCOPY, GASTROSCOPY, DUODENOSCOPY (EGD), REMOVE FOREIGN BODY;  Surgeon: Mandeep Park MD;  Location:  GI    ESOPHAGOSCOPY, GASTROSCOPY, DUODENOSCOPY (EGD), COMBINED N/A 10/25/2015    Procedure: COMBINED ESOPHAGOSCOPY, GASTROSCOPY, DUODENOSCOPY (EGD);  Surgeon: Sammy Amaro MD;  Location:  GI    ESOPHAGOSCOPY, GASTROSCOPY, DUODENOSCOPY (EGD), COMBINED N/A 10/25/2015    Procedure: COMBINED ESOPHAGOSCOPY, GASTROSCOPY, DUODENOSCOPY (EGD), BIOPSY SINGLE OR MULTIPLE;  Surgeon: Sammy Amaro MD;  Location:  GI    ESOPHAGOSCOPY, GASTROSCOPY, DUODENOSCOPY (EGD), COMBINED N/A 01/31/2023    Procedure: ESOPHAGOGASTRODUODENOSCOPY (EGD) with peg replacement ;  Surgeon: Mandeep Park MD;  [facility]    ESOPHAGOSCOPY, GASTROSCOPY, DUODENOSCOPY (EGD), DILATATION, COMBINED      EXCISE LESION TRUNK N/A 04/17/2017    Procedure: EXCISE LESION TRUNK;  Removal of Abdominal Foreign Body;  Surgeon: Nestor Phoenix MD;  [facility]    HC ESOPH/GAS REFLUX TEST W NASAL IMPED >1 HR N/A 11/19/2015    Procedure: ESOPHAGEAL IMPEDENCE FUNCTION TEST WITH 24 HOUR PH GREATER THAN 1 HOUR;  Surgeon: Thiago Apple MD;  [facility]    HC UGI ENDOSCOPY DIAG W BIOPSY  09/17/2008    HC UGI ENDOSCOPY DIAG W BIOPSY  09/27/2012    HC UGI ENDOSCOPY W ESOPHAGEAL DILATION BALLOON <30MM  09/17/2008    HC UGI ENDOSCOPY W EUS N/A 05/05/2015    Procedure: COMBINED ENDOSCOPIC ULTRASOUND, ESOPHAGOSCOPY, GASTROSCOPY, DUODENOSCOPY (EGD);  Surgeon:  Wm Duñeas MD;  [facility]    HC WRIST ARTHROSCOP,RELEASE XVERS LIG Bilateral 12/17/2008    INJECT TRANSVERSUS ABDOMINIS PLANE (TAP) BLOCK BILATERAL Left 09/22/2016    Procedure: INJECT TRANSVERSUS ABDOMINIS PLANE (TAP) BLOCK BILATERAL;  Surgeon: Dickson Corrigan MD;  [facility]    INJECT TRIGGER POINT Bilateral 09/08/2022    Procedure: Abdominal trigger point injection with ultrasound;  Surgeon: Monika Mahajan MD;  [facility]    INJECT TRIGGER POINT SINGLE / MULTIPLE 3 OR MORE MUSCLES Right 11/15/2022    Procedure: Trigger point injections right abdomen with ultrasound guidance;  Surgeon: Monika Mahajan MD;  Location: [facility]    IR CHEST PORT PLACEMENT < 5 YRS OF AGE  06/10/2022    IR CVC TUNNEL PLACEMENT > 5 YRS OF AGE  4/15/2024    IR PORT REMOVAL RIGHT  11/09/2023    laparoscopic pineda  01/01/1995    LAPAROSCOPIC HERNIORRHAPHY INCISIONAL N/A 08/23/2018    Procedure: LAPAROSCOPIC HERNIORRHAPHY INCISIONAL;  Laparoscopic Incisional Hernia Repair with Symbotex Mesh Implant;  Surgeon: Nestor Phoenix MD;  [facility]    LAPAROSCOPIC PANCREATECTOMY, TRANSPLANT AUTO ISLET CELL N/A 12/28/2016    Procedure: LAPAROSCOPIC PANCREATECTOMY, TRANSPLANT AUTO ISLET CELL;  Surgeon: Nestor Phoenix MD;  [facility]    LAPAROTOMY EXPLORATORY N/A 01/31/2023    Procedure: Exploratory Laparotomy, lysis of adhesions, Perforated J-Junostomy Resection, Replace J-Junostomy site;  Surgeon: Elver Bauer MD;  [facility]    LAPAROTOMY, LYSIS ADHESIONS, COMBINED N/A 01/31/2023    Procedure: Dilatation of jejunostomy feeding tube, track with placement of jejunostomy tube with fluoroscopy;  Surgeon: Elver Bauer MD;  [facility]    PICC DOUBLE LUMEN PLACEMENT Left 11/13/2023    Basilic Vein 5F DL 43 cm    REMOVE AND REPLACE BREAST IMPLANT PROSTHESIS N/A 12/30/2022    Procedure: Exploratory Laparotomy, lysis of adhesions, small bowel resection. Placement of gastric jejunostomy  for enteral feeding.;  Surgeon: Elver Bauer MD;  [facility]    REMOVE GASTROSTOMY TUBE ADULT N/A 01/28/2022    Procedure: REMOVAL, GASTROSTOMY TUBE, ADULT;  Surgeon: Mandeep Park MD;  [facility]    REPLACE GASTROJEJUNOSTOMY TUBE, PERCUTANEOUS N/A 09/07/2021    Procedure: GASTROJEJUNOSTOMY TUBE PLACEMENT, PERCUTANEOUS, WITH GASTROPEXY;  Surgeon: Mandeep Park MD;  [facility]    REPLACE GASTROJEJUNOSTOMY TUBE, PERCUTANEOUS N/A 09/22/2021    Procedure: REPLACEMENT, GASTROJEJUNOSTOMY TUBE, PERCUTANEOUS;  Surgeon: Zackery Montoya MD;  [facility]    REPLACE GASTROJEJUNOSTOMY TUBE, PERCUTANEOUS N/A 11/22/2022    Procedure: REPLACEMENT, GASTROJEJUNOSTOMY TUBE, PERCUTANEOUS;  Surgeon: Mandeep Park MD;  [facility]    REPLACE GASTROJEJUNOSTOMY TUBE, PERCUTANEOUS N/A 12/09/2022    Procedure: REPLACEMENT, GASTROJEJUNOSTOMY TUBE, PERCUTANEOUS with ENDOSCOPIC SUTURING.;  Surgeon: Mandeep Park MD;  [facility]    REPLACE GASTROJEJUNOSTOMY TUBE, PERCUTANEOUS N/A 08/01/2023    Procedure: Replace Gastrojejunostomy Tube, Percutaneous;  Surgeon: Mandeep Park MD;  [facility]    REPLACE GASTROJEJUNOSTOMY TUBE, PERCUTANEOUS N/A 11/11/2023    Procedure: Replace Gastrojejunostomy Tube, Percutaneous;  Surgeon: Francisco Dodson MD;  [facility]    REPLACE GASTROJEJUNOSTOMY TUBE, PERCUTANEOUS N/A 12/8/2023    Procedure: Replace Gastrojejunostomy Tube, Percutaneous;  Surgeon: Mandeep Park MD;  [facility]    REPLACE JEJUNOSTOMY TUBE, PERCUTANEOUS N/A 09/10/2021    Procedure: UPPER ENDOSCOPY, REPLACEMENT OF PERCUTANEOUS GASTROJEJUNOSTOMY TUBE, T-TAG GASTROPEXY;  Surgeon: Zackery Montoya MD;  [facility]    REPLACE JEJUNOSTOMY TUBE, PERCUTANEOUS N/A 12/29/2021    Procedure: REPLACEMENT, JEJUNOSTOMY TUBE, PERCUTANEOUS;  Surgeon: Mandeep Park MD;  [facility]    REPLACE JEJUNOSTOMY TUBE, PERCUTANEOUS N/A  2023    Procedure: REPLACEMENT, JEJUNOSTOMY TUBE, PERCUTANEOUS;  Surgeon: Mandeep Park MD;  [facility]    REPLACE JEJUNOSTOMY TUBE, PERCUTANEOUS  2023    Procedure: Replace Jejunostomy Tube, Percutaneous;  Surgeon: Mandeep Park MD;  [facility]    REPLACE JEJUNOSTOMY TUBE, PERCUTANEOUS N/A 2024    Procedure: REPLACEMENT, JEJUNOSTOMY TUBE, PERCUTANEOUS;  Surgeon: Francisco Dodson MD;  [facility]    transphenoidal pituitary resection  1990    [facility]  DELIVERY ONLY  1996    [facility]  DELIVERY ONLY  1998    repeat c section with incidental cystotomy with repair    Z EXCIS PITUITARY,TRANSNASAL/SEPTAL  1980    pituitary tumor removed for diabetes insipidus    [facility] TOTAL ABDOM HYSTERECTOMY  1999    w/ bilateral salpingoophorectomy               Social History:   Lives in [place], [place] with .           Family History:   Patient's family history is reviewed today and is non-contributory    Family History   Problem Relation Age of Onset    Lipids Mother     Hypertension Mother     Thyroid Disease Mother     Depression Mother     Angina Mother     GERD Mother     Skin Cancer Mother     Migraines Mother     Autoimmune Disease Mother     Hyperlipidemia Mother     Mental Illness Mother     Cerebrovascular Disease Mother         2023    Other Cancer Mother         skin-basil cell    Anxiety Disorder Mother     Eye Disorder Father         cataract, detached retina    Myocardial Infarction Father 60    Lipids Father     Cerebrovascular Disease Father     Depression Father     Substance Abuse Father     Anesthesia Reaction Father         stroke right after surgery    Cataracts Father     Osteoarthritis Father     Ulcerative Colitis Father     Autoimmune Disease Father     Heart Disease Father     Hyperlipidemia Father     Mental Illness Father     Other Cancer Father         myloma    Anxiety Disorder Father     Interpersonal Violence  Sister     Coronary Artery Disease Sister         angioplasty    GERD Sister     Substance Abuse Sister     Cerebrovascular Disease Sister         2005    Depression Sister     Thyroid Disease Sister     Eye Disorder Maternal Grandmother         cataract    Thyroid Disease Maternal Grandmother     Diabetes Maternal Grandfather     Eye Disorder Paternal Grandmother         cataract    Diabetes Paternal Grandmother     Eye Disorder Paternal Grandfather         cataract    Diabetes Paternal Grandfather     Substance Abuse Paternal Grandfather     Eye Disorder Son         ptosis    Depression Son     Anxiety Disorder Son     Heart Disease Paternal Aunt     Diabetes Paternal Aunt     Diabetes Paternal Uncle     Heart Disease Paternal Uncle     Depression Nephew     Anxiety Disorder Nephew     Thyroid Disease Nephew     Diabetes Type 2  Cousin         paternal cousin    Autoimmune Disease Cousin     Autoimmune Disease Sister     Depression Sister     Mental Illness Sister     Substance Abuse Sister     Thyroid Disease Sister     Depression Son     Mental Illness Son     Anxiety Disorder Son     Thyroid Disease Nephew     Anxiety Disorder Nephew              Allergies:   Reviewed and edited as appropriate     Allergies   Allergen Reactions    Apple Juice Anaphylaxis    Corticosteroids Other (See Comments)     All oral, IV and injectable steroids are contraindicated (unless in life threatening situations) in Islet Auto transplant recipients. They can cause irreversible loss of islet cell function. Please contact patient's transplant care coordinator ADI Gaffney RN at [phone]/pager [phone] and/or endocrinologist prior to administration.      Depakote [Divalproex Sodium] Other (See Comments)     Chest pain    Zithromax [Azithromycin Dihydrate] Anaphylaxis     Noted in 4/7/08 ER    Bromfenac      Other reaction(s): Headache    Codeine      Other reaction(s): Hallucinations    Darvocet [Propoxyphene N-Apap] Itching     Morphine Nausea and Vomiting and Rash    Nalbuphine Hcl Rash     RASH :nubaine    Zosyn [Piperacillin-Tazobactam In Dex] Rash     Possible allergy, did have a diffuse rash that seemed drug related but could have also been related to soap in the hospital.     Bactrim [Sulfamethoxazole W-Trimethoprim] Other (See Comments) and Nausea and Vomiting     Severely low liver function.    Statins Other (See Comments)     Previous liver issues, risks vs benefits felt to not warrant statin.  Discussed Oct 2022 visit    Tape [Adhesive Tape] Blisters    Tramadol     Zofran [Ondansetron] Other (See Comments)     migraine    Flagyl [Metronidazole] Hives and Rash            Medications:     Current Facility-Administered Medications   Medication Dose Route Frequency Provider Last Rate Last Admin    acetaminophen (TYLENOL) oral liquid 650 mg  650 mg Per J Tube Q6H PRN Igor العلي PA-C        amitriptyline (ELAVIL) suspension 60 mg  60 mg Per Feeding Tube At Bedtime Peewee Stanley MD   60 mg at 07/24/24 0041    baclofen (LIORESAL) tablet 20 mg  20 mg Per G Tube Q8H CAROL Igor العلي PA-C        Botulinum Toxin Type A (BOTOX) 200 units injection 155 Units  155 Units Intramuscular See Admin Instructions         Botulinum Toxin Type A (BOTOX) 200 units injection 155 Units  155 Units Intramuscular See Admin Instructions Rachelle العلي APRN CNP   155 Units at 05/23/24 1703    buprenorphine (SUBUTEX) sublingual tablet 2 mg  2 mg Sublingual TID Igor العلي PA-C   2 mg at 07/23/24 2326    calcium carbonate (TUMS) chewable tablet 1,000 mg  1,000 mg Oral 4x Daily PRN Igor العلي PA-C        cetirizine (zyrTEC) tablet 10 mg  10 mg Per G Tube BID Igor العلي PA-C   10 mg at 07/24/24 0040    glucose gel 15-30 g  15-30 g Oral Q15 Min PRN Igor العلي PA-C        Or    dextrose 50 % injection 25-50 mL  25-50 mL Intravenous Q15 Min PRN Igor العلي PA-C        Or    glucagon injection 1 mg  1 mg  Subcutaneous Q15 Min PRN Igor العلي PA-C        diphenhydrAMINE (BENADRYL) elixir 25 mg  25 mg Oral 4x Daily PRN Igor العلي PA-C        droNABinol (SYNDROS) oral soln 2.5 mg  2.5 mg Oral BID Igor العلي PA-C        DULoxetine (CYMBALTA) oral suspension 60 mg  60 mg Per J Tube QPM [place] Song, RPH   60 mg at 07/24/24 0040    Enema Compound (docusate/mineral oil/NaPhos) NO MAG CIT PREMIX  226 mL Rectal Once Peewee Stanley MD        [START ON 7/25/2024] estradiol (VAGIFEM) vaginal tablet 10 mcg  10 mcg Vaginal Once per day on Monday Thursday Igor العلي PA-C        famotidine (PEPCID) suspension 20 mg  20 mg Per J Tube Daily Igor العلي PA-C        HYDROmorphone (PF) (DILAUDID) injection 0.5 mg  0.5 mg Intravenous Q2H PRN Sarabjit Dickens MD        insulin aspart (NovoLOG) injection (RAPID ACTING)  1-7 Units Subcutaneous Q4H Igor العلي PA-C        insulin detemir (LEVEMIR PEN) injection 7 Units  7 Units Subcutaneous QAM AC Igor العلي PA-C        levothyroxine (TIROSINT-SOL) suspension 137 mcg + PATIENT SUPPLIED +  137 mcg Per Feeding Tube QAM  [place] Song, RPCASH        Lidocaine (LIDOCARE) 4 % Patch 1 patch  1 patch Transdermal Q24H Igor العلي PA-C        lidocaine (LMX4) cream   Topical Q1H PRN Igor العلي PA-C        lidocaine 1 % 0.1-1 mL  0.1-1 mL Other Q1H PRN Igor العلي PA-C        lipase-protease-amylase (CREON 24) 93253-86661-293651 units per EC capsule 3-4 capsule  3-4 capsule Oral TID w/meals Igor العلي PA-C        methocarbamol (ROBAXIN) tablet 750 mg  750 mg Oral 4x Daily PRN Igor العلي PA-C   750 mg at 07/24/24 0442    naloxegol (MOVANTIK) tablet 25 mg + PATIENT SUPPLIED +  25 mg Oral QAM AC Igor العلي PA-C        naloxone (NARCAN) injection 0.2 mg  0.2 mg Intravenous Q2 Min PRN Peewee Stanley MD        Or    naloxone (NARCAN) injection 0.4 mg  0.4 mg Intravenous Q2 Min PRN Peewee Stanley MD        Or     naloxone (NARCAN) injection 0.2 mg  0.2 mg Intramuscular Q2 Min PRN Peewee Stanley MD        Or    naloxone (NARCAN) injection 0.4 mg  0.4 mg Intramuscular Q2 Min PRN Peewee Stanley MD        pantoprazole (PROTONIX) IV push injection 40 mg  40 mg Intravenous Daily with breakfast Igor العلي PA-C        prochlorperazine (COMPAZINE) suppository 25 mg  25 mg Rectal Q12H PRN Igor العلي PA-C        prochlorperazine (COMPAZINE) tablet 10 mg  10 mg Oral Q6H PRN Igor العلي PA-C   10 mg at 07/24/24 0513    promethazine (PHENERGAN) 25 mg in sodium chloride 0.9 % 55 mL intermittent infusion  25 mg Intravenous TID [place] Song, H        rimegepant (NURTEC) ODT tablet 75 mg + PATIENT SUPPLIED +  75 mg Sublingual Daily PRN Igor العلي PA-C        scopolamine (TRANSDERM) 72 hr patch 1 patch  1 patch Transdermal Q72H Igor العلي PA-C        And    scopolamine (TRANSDERM-SCOP) Patch in Place   Transdermal Q8H CAROL Igor العلي PA-C        senna-docusate (SENOKOT-S/PERICOLACE) 8.6-50 MG per tablet 1 tablet  1 tablet Oral BID PRN Igor العلي PA-C        Or    senna-docusate (SENOKOT-S/PERICOLACE) 8.6-50 MG per tablet 2 tablet  2 tablet Oral BID PRN Igor العلي PA-C        [Held by provider] sennosides (SENOKOT) syrup 5 mL  5 mL Per J Tube BID Igor العلي PA-C        sodium chloride (PF) 0.9% PF flush 3 mL  3 mL Intracatheter Q8H Igor العلي PA-C   3 mL at 07/24/24 0605    sodium chloride (PF) 0.9% PF flush 3 mL  3 mL Intracatheter q1 min prn Igor العلي PA-C        ZOLMitriptan (ZOMIG-ZMT) ODT tab 5 mg  5 mg Oral at onset of headache Igor العلي PA-C         Current Outpatient Medications   Medication Sig Dispense Refill    acetaminophen (TYLENOL) 325 MG/10.15ML solution 20.3 mLs (650 mg) by Per J Tube route every 6 hours as needed for mild pain or fever 473 mL 4    baclofen (LIORESAL) 10 MG tablet 1-2 tablets (10-20 mg) by Per G Tube route 3 times daily 60  tablet 1    buprenorphine (SUBUTEX) 2 MG SUBL sublingual tablet Place 2 mg under the tongue 2 times daily      cetirizine (ZYRTEC) 10 MG tablet 1 tablet (10 mg) by Per G Tube route 2 times daily 60 tablet 11    COMPOUNDED NON-CONTROLLED SUBSTANCE (CMPD RX) - PHARMACY TO MIX COMPOUNDED MEDICATION Administer 60 mg amitriptyline 2 mg/ml via G-J tube at bedtime 900 mL 3    COMPOUNDED NON-CONTROLLED SUBSTANCE (CMPD RX) - PHARMACY TO MIX COMPOUNDED MEDICATION Administer 40 mg amitriptyline suspension (2 mg/mL) via G-J tube at bedtime. 600 mL 5    COMPOUNDED NON-CONTROLLED SUBSTANCE (CMPD RX) - PHARMACY TO MIX COMPOUNDED MEDICATION Place 1 enema rectally 2 times daily as needed (for constipation) 60 each 3    Continuous Blood Gluc Sensor (FREESTYLE LISA 3 SENSOR) MISC CHANGE EVERY 14 DAYS 2 each 11    Digestive Enzyme Cartridge (RELIZORB) MARCO 2 Cartridges daily 60 each 6    diphenhydrAMINE (BENADRYL) 12.5 MG/5ML solution Take 25 mg by mouth 4 times daily as needed for other (nausea)      droNABinol (SYNDROS) 5 MG/ML oral soln Take 0.5 mLs (2.5 mg) by mouth 2 times daily 60 mL 0    DULoxetine HCl 60 MG CSDR 1 capsule (60 mg) by Per J Tube route daily Sprinkle caps for feeding tube 90 capsule 3    estradiol (VAGIFEM) 10 MCG TABS vaginal tablet INSERT 1 TABLET(10 MCG) VAGINALLY 2 TIMES A WEEK 24 tablet 2    famotidine (PEPCID) 40 MG/5ML suspension 2.5 mLs (20 mg) by Per J Tube route daily 50 mL 4    fluconazole (DIFLUCAN) 40 MG/ML suspension Take 5 mLs by mouth every 24 hours      glucagon 1 MG kit Give 0.1 to 0.15mg( 10-15 units on Insulin sryinge) subcutaneous  every 15 minutes PRN for hypoglycemia. Remix new kit q24hr. Needs up to 3 kit/week. 10 each 3    glucose 40 % GEL gel Take 15-30 g by mouth every 15 minutes as needed for low blood sugar 3 Tube 2    ibuprofen (ADVIL/MOTRIN) 400 MG tablet take 30 mls  by oral route  every 4 - 6 hours as needed      insulin aspart (NOVOLOG FLEXPEN) 100 UNIT/ML pen Take 1 unit if BG  "is 175-275, and 2 units if >275 up to every 4 hours as needed.  \"Prime\" pen needle with 2 unit airshot before giving, needs supply for 15 unit/day. 15 mL 11    insulin detemir (LEVEMIR PEN) 100 UNIT/ML pen Take 7 to 10 units per day, adjust as directed by MD.  Do 2 unit 'prime' before dosing, needs 12 units/day supply. 15 mL 5    insulin syringe-needle U-100 (30G X 1/2\" 0.3 ML) 30G X 1/2\" 0.3 ML miscellaneous Use 3 syringes daily or as directed. 100 each 11    lactated ringers infusion Inject 1,000 mLs into the vein daily as needed      levothyroxine (SYNTHROID) 25 mcg/mL SUSP 5.48 mLs (137 mcg) by Per J Tube route daily 500 mL 3    lidocaine (LIDODERM) 5 % patch Place onto the skin every 24 hours To prevent lidocaine toxicity, patient should be patch free for 12 hrs daily.      lipase-protease-amylase (CREON) [id number] units CPEP per EC capsule Take 3-4 capsules by mouth 3 times daily (with meals) With oral meals 150 capsule 11    methocarbamol (ROBAXIN) 750 MG tablet Take 1 tablet (750 mg) by mouth 4 times daily as needed for muscle spasms 120 tablet 11    montelukast (SINGULAIR) 10 MG tablet Take by mouth every 24 hours      naloxegol (MOVANTIK) 12.5 MG TABS tablet Take 25 mg by mouth every morning (before breakfast)      Nutritional Supplements (BOOST HIGH PROTEIN) LIQD After above baseline labs are drawn, give: 6 mL/kg to maximum of 360 mL; the beverage is to be consumed within 5 minutes.  0    pantoprazole (PROTONIX) 40 mg IV push injection Inject 40 mg into the vein daily (with breakfast) 30 each 11    prochlorperazine (COMPAZINE) 10 MG tablet Take 1 tablet (10 mg) by mouth every 6 hours as needed for nausea or vomiting 120 tablet 3    prochlorperazine (COMPRO) 25 MG suppository Place 1 suppository (25 mg) rectally every 12 hours as needed for nausea 10 suppository 2    promethazine 25 mg Inject 25 mg into the vein 3 times daily      promethazine-phenylephrine (PROMETHAZINE VC) 6.25-5 MG/5ML syrup " "Take 5 mLs by mouth every 6 hours      rimegepant (NURTEC) 75 MG ODT tablet Place 1 tablet (75 mg) under the tongue daily as needed for migraine Maximum of 1 tablet every 24 hours. 8 tablet 11    scopolamine (TRANSDERM) 1 MG/3DAYS 72 hr patch Place 1 patch onto the skin every 72 hours - Transdermal 10 patch 11    sennosides (SENOKOT) 8.8 MG/5ML syrup 5 mLs by Per J Tube route 2 times daily 236 mL 11    Sharps Container (365 docobites SHARPS ) MISC 1 Container as needed 1 each 1    silver nitrate (ARZOL SILVER NIT APPLICATORS) 75-25 % miscellaneous Apply topically as needed for wound care Apply Silver Nitrate Sticks every 2-3 days until granulation tissue resolved.  \"Roll\" tip of Silver Nitrate Q tip directly onto the granulation tissue-bulging tissue area.  Have Agency or Home Care Nurse administer this, if you prefer.  Take care not to touch any healthy tissue or skin.  The tissue will turn white and eventually black & scab off.  May repeat if needed in the future for recurrence. 30 applicator 0    sodium phosphate, mineral oil, magnesium citrate enema Instill 1 pink 187 ml enema twice daily as needed for constipation      STATIN NOT PRESCRIBED (INTENTIONAL) Previous liver issues, risks vs benefits felt to not warrant statin.    Discussed Oct 2022 visit      zinc oxide - white petrolatum (CRITIC-AID THICK MOIST BARRIER) 20-51% PSTE topical paste Apply 71 g topically every hour as needed for rash (Under J tube bumper when needed for skin protection) 170 g 0    ZOLMitriptan (ZOMIG-ZMT) 5 MG ODT Take 1 tablet (5 mg) by mouth at onset of headache for migraine Dissolve tablet in the mouth. May repeat in 2 hours. Max 2 tablets/24 hours. 12 tablet 6    insulin aspart (NOVOLOG PEN) 100 UNIT/ML pen Take 1 unit if BG is 175-275, and 2 units if >275 up to every 4 hours as needed.  \"Prime\" pen needle with 2 unit airshot before giving, needs supply for 15 unit/day. 15 mL 11             Review of Systems:     A complete review of " systems was performed and is negative except as noted in the HPI           Physical Exam:   Temp: 97.7  F (36.5  C) Temp src: Oral BP: 90/55 Pulse: 98   Resp: 16 SpO2: 97 % O2 Device: None (Room air)    Wt:   Wt Readings from Last 10 Encounters:   07/22/24 56.7 kg (125 lb)   06/05/24 57.2 kg (126 lb)   05/30/24 56.2 kg (124 lb)   04/16/24 57.5 kg (126 lb 12.2 oz)   03/20/24 57 kg (125 lb 11.2 oz)   03/20/24 55.8 kg (123 lb)   03/13/24 56.9 kg (125 lb 8 oz)   03/06/24 56.2 kg (124 lb)   02/05/24 58.4 kg (128 lb 12.8 oz)   12/18/23 57 kg (125 lb 9.6 oz)      General: Pleasant female in NAD/non-toxic appearing.  Answers appropriately.    HEENT: Head is AT/NC. Sclera anicteric. No conjunctival injection.  Oropharynx is clear, moist and w/o exudate or lesions. No thrush. Good dentition.  Lungs: Non-labored breathing on RA.  Heart: Regular rate and rhythm.  No murmurs, gallops or rubs.  Normal S1 and S2.  Abdomen: Soft, non-distended.  18 Fr [employer] PEG with 20 mL balloon.  Tender around PEG site but no erythema/no bilious nor purulent drainage with external bumper at 5.5 cm marker at skin.  Tube easily retracts but with increased pain when got to ~4 cm marker, balloon with ~12 mL in balloon. Gtube capped (just gave med via). Non-tender around PEJ site, capped.  No rebound or peritoneal signs.  Extremities: WWP, no pedal edema.  MSK: no gross deformity, thin appearing but no overt muscle wasting.  Skin: No jaundice or rash  Neurologic: Grossly non-focal.  CN 2-12 grossly intact.   Psych: mood appropriate to situation             Data:   Labs and imaging below were independently reviewed and interpreted    LAB WORK:    BMP  Recent Labs   Lab 07/24/24  0600 07/24/24  0431 07/24/24  0013 07/23/24  1708     --   --  139   POTASSIUM 4.6  --   --  4.8   CHLORIDE 101  --   --  102   KASSI 8.3*  --   --  8.8   CO2 28  --   --  28   BUN 21.8*  --   --  21.1*   CR 0.59  --   --  0.62   * 131* 136* 108*      CBC  Recent Labs   Lab 07/24/24  0600 07/23/24  1708   WBC 6.6 7.8   RBC 3.86 4.15   HGB 12.0 12.6   HCT 36.6 39.8   MCV 95 96   MCH 31.1 30.4   MCHC 32.8 31.7   RDW 13.7 13.7    428     INRNo lab results found in last 7 days.  LFTs  Recent Labs   Lab 07/24/24  0600 07/23/24  1708   ALKPHOS 93 102   AST 20 25   ALT 20 23   BILITOTAL 0.2 0.4   PROTTOTAL 6.1* 6.8   ALBUMIN 3.4* 3.9      PANCNo lab results found in last 7 days.    IMAGING:  (Personally reviewed)    7/23/2024: CT ABDOMEN PELVIS W CONTRAST, 7/23/2024 7:56 PM     INDICATION: moderate to severe pain at g-tube site/ RUQ abdomen     COMPARISON STUDY: Fluoroscopic images 4/16/2024, CT abdomen and pelvis  11/8/2023     TECHNIQUE: CT scan of the abdomen and pelvis was performed on  multidetector CT scanner using volumetric acquisition technique and  images were reconstructed in multiple planes with variable thickness  and reviewed on dedicated workstations.      CONTRAST: iopamidol (ISOVUE-370) solution 77mL injected IV without  oral contrast     CT scan radiation dose is optimized to minimum requisite dose using  automated dose modulation techniques.     FINDINGS:     Lower thorax: Heart size is normal without pericardial effusion. Trace  basilar atelectasis. Calcified left lower lobe granuloma.     Liver: No mass. No intrahepatic biliary ductal dilation.     Biliary System: Cholecystectomy. No extrahepatic biliary ductal  dilation.     Pancreas: Surgically absent.     Adrenal glands: No mass or nodules     Spleen: Surgically absent.     Kidneys: No suspicious mass, obstructing calculus or hydronephrosis.     Gastrointestinal tract : Percutaneous gastrostomy tube is located  within the gastric lumen with no focal abnormality the gastrostomy  tract. Percutaneous jejunostomy in the left midabdomen with tip in the  jejunum. No abnormality of the jejunostomy tract. A few prominent  loops of small bowel at the small bowel anastomosis, similar  compared  to prior. Small bowel is otherwise within normal limits. Large stool  burden within the colon with gaseous distention of the rectum.  Appendectomy.     Mesentery/peritoneum/retroperitoneum: No mass. No free fluid or air.     Lymph nodes: No significant lymphadenopathy.     Vasculature: Patent major abdominal vasculature.     Pelvis: Urinary bladder is distended.  Prior hysterectomy.     Osseous structures: No aggressive or acute osseous lesion.  L2  vertebral body hemangioma, unchanged.      Soft tissues: Within normal limits.                                                                      IMPRESSION:   1. Normal appearance of the percutaneous gastrostomy and percutaneous  jejunostomy with appropriate positioning.  2. Large colonic stool burden, which may indicate constipation.      =======================================================================

## 2024-07-24 NOTE — PROGRESS NOTES
Neuro: A&Ox4. Neuros intact. Speech clear and spontaneous. Difficulty swallowing, baseline. Dizziness upon standing, resides.   Cardiac: SR. VSS. Denies chest pain.   Respiratory: Sating 100% on RA. Lungs clear bilaterally. Denies SOB.   GI/: Adequate urine output. No BM this shift, endorses constipation. Continous nausea, relief reported with medications. G tube manipulated in ENDO today, endorses abdominal pain relief after procedure.   Diet/appetite: Regular diet, eats minimally for pleasure. TPN running throughout shift, independent. Tube feeds have not been started due to being NPO for EGD today  Activity:  stand by assist in halls and to bathroom.   Pain: Pain not managed prior to procedure. Frequent PRNs utilized. After procedure, patient reporting no pain.   Skin: Left wrist noted to have scab.   LDA's: G and J tube sites on abdomen. G tube for crushed meds and feeds, J tube for suspension medications. Both dressings changed this shift, no drainage noted. G tube marking 4.  CDI.   CVC double lumen, blood return noted, flushes. CDI.     Plan:     EGD in endo today. Did not replace G tube.   Tube feeds to start with goal rate of 50 mL/hr, total 1440 mL.   Gastrografin enema to be given when patient more awake and alert. Call xray when administering to schedule xray. Likely to discharge tomorrow after BM.      Continue with POC. Notify primary team with changes.

## 2024-07-24 NOTE — PHARMACY-CONSULT NOTE
Pharmacy Tube Feeding Consult    Medication reviewed for administration by feeding tube and for potential food/drug interactions.    Recommendation: No changes are needed at this time.     Pharmacy will continue to follow as new medications are ordered.     Gilles He, PGY-1 Pharmacy Resident

## 2024-07-24 NOTE — MEDICATION SCRIBE - ADMISSION MEDICATION HISTORY
Medication Scribe Admission Medication History    Admission medication history is complete. The information provided in this note is only as accurate as the sources available at the time of the update.    Information Source(s): Patient via in-person    Pertinent Information: Pt reported taking medications on PTA medication list as directed, stated she is taking Buprenorphine 2 mg SUBL tabs for pain management and muscle spasm.     Changes made to PTA medication list:  Added: Fluconazole 5 ml 40 mg/5ml suspension, Ibuprofen 400 mg tabs, Singular 10 mg tabs, Promethazine-phenylephrine 6.25mg-5ml syrup   Deleted: Motegrity 2 mg tabs.   Changed: None    Allergies reviewed with patient and updates made in EHR: yes    Medication History Completed By: Rosa Maria Sawant 7/24/2024 7:17 AM    Current Facility-Administered Medications for the 7/23/24 encounter (Hospital Encounter)   Medication    Botulinum Toxin Type A (BOTOX) 200 units injection 155 Units    Botulinum Toxin Type A (BOTOX) 200 units injection 155 Units     PTA Med List   Medication Sig Last Dose    acetaminophen (TYLENOL) 325 MG/10.15ML solution 20.3 mLs (650 mg) by Per J Tube route every 6 hours as needed for mild pain or fever 7/23/2024    baclofen (LIORESAL) 10 MG tablet 1-2 tablets (10-20 mg) by Per G Tube route 3 times daily 7/23/2024    buprenorphine (SUBUTEX) 2 MG SUBL sublingual tablet Place 2 mg under the tongue 2 times daily 7/23/2024    cetirizine (ZYRTEC) 10 MG tablet 1 tablet (10 mg) by Per G Tube route 2 times daily 7/23/2024    COMPOUNDED NON-CONTROLLED SUBSTANCE (CMPD RX) - PHARMACY TO MIX COMPOUNDED MEDICATION Administer 60 mg amitriptyline 2 mg/ml via G-J tube at bedtime     COMPOUNDED NON-CONTROLLED SUBSTANCE (CMPD RX) - PHARMACY TO MIX COMPOUNDED MEDICATION Administer 40 mg amitriptyline suspension (2 mg/mL) via G-J tube at bedtime. 7/23/2024    COMPOUNDED NON-CONTROLLED SUBSTANCE (CMPD RX) - PHARMACY TO MIX COMPOUNDED MEDICATION Place 1  "enema rectally 2 times daily as needed (for constipation) 7/20/2024    Continuous Blood Gluc Sensor (FREESTYLE LISA 3 SENSOR) MISC CHANGE EVERY 14 DAYS     Digestive Enzyme Cartridge (RELIZORB) MARCO 2 Cartridges daily 7/22/2024    diphenhydrAMINE (BENADRYL) 12.5 MG/5ML solution Take 25 mg by mouth 4 times daily as needed for other (nausea) 7/23/2024    droNABinol (SYNDROS) 5 MG/ML oral soln Take 0.5 mLs (2.5 mg) by mouth 2 times daily 7/23/2024    DULoxetine HCl 60 MG CSDR 1 capsule (60 mg) by Per J Tube route daily Sprinkle caps for feeding tube 7/23/2024    estradiol (VAGIFEM) 10 MCG TABS vaginal tablet INSERT 1 TABLET(10 MCG) VAGINALLY 2 TIMES A WEEK 7/21/2024    famotidine (PEPCID) 40 MG/5ML suspension 2.5 mLs (20 mg) by Per J Tube route daily 7/22/2024    fluconazole (DIFLUCAN) 40 MG/ML suspension Take 5 mLs by mouth every 24 hours 7/23/2024    glucagon 1 MG kit Give 0.1 to 0.15mg( 10-15 units on Insulin sryinge) subcutaneous  every 15 minutes PRN for hypoglycemia. Remix new kit q24hr. Needs up to 3 kit/week. 7/19/2024    glucose 40 % GEL gel Take 15-30 g by mouth every 15 minutes as needed for low blood sugar  at has not used    ibuprofen (ADVIL/MOTRIN) 400 MG tablet take 30 mls  by oral route  every 4 - 6 hours as needed 7/23/2024    insulin aspart (NOVOLOG FLEXPEN) 100 UNIT/ML pen Take 1 unit if BG is 175-275, and 2 units if >275 up to every 4 hours as needed.  \"Prime\" pen needle with 2 unit airshot before giving, needs supply for 15 unit/day. 7/23/2024    insulin detemir (LEVEMIR PEN) 100 UNIT/ML pen Take 7 to 10 units per day, adjust as directed by MD.  Do 2 unit 'prime' before dosing, needs 12 units/day supply. 7/23/2024    insulin syringe-needle U-100 (30G X 1/2\" 0.3 ML) 30G X 1/2\" 0.3 ML miscellaneous Use 3 syringes daily or as directed.     lactated ringers infusion Inject 1,000 mLs into the vein daily as needed  at on HOLD    levothyroxine (SYNTHROID) 25 mcg/mL SUSP 5.48 mLs (137 mcg) by Per J Tube " route daily 7/22/2024    lidocaine (LIDODERM) 5 % patch Place onto the skin every 24 hours To prevent lidocaine toxicity, patient should be patch free for 12 hrs daily. Unknown    lipase-protease-amylase (CREON) [id number] units CPEP per EC capsule Take 3-4 capsules by mouth 3 times daily (with meals) With oral meals Unknown    methocarbamol (ROBAXIN) 750 MG tablet Take 1 tablet (750 mg) by mouth 4 times daily as needed for muscle spasms 7/23/2024    montelukast (SINGULAIR) 10 MG tablet Take by mouth every 24 hours 7/23/2024    naloxegol (MOVANTIK) 12.5 MG TABS tablet Take 25 mg by mouth every morning (before breakfast) 7/23/2024    Nutritional Supplements (BOOST HIGH PROTEIN) LIQD After above baseline labs are drawn, give: 6 mL/kg to maximum of 360 mL; the beverage is to be consumed within 5 minutes. Unknown    pantoprazole (PROTONIX) 40 mg IV push injection Inject 40 mg into the vein daily (with breakfast) 7/18/2024    prochlorperazine (COMPAZINE) 10 MG tablet Take 1 tablet (10 mg) by mouth every 6 hours as needed for nausea or vomiting Unknown    prochlorperazine (COMPRO) 25 MG suppository Place 1 suppository (25 mg) rectally every 12 hours as needed for nausea 7/23/2024    promethazine 25 mg Inject 25 mg into the vein 3 times daily Unknown    promethazine-phenylephrine (PROMETHAZINE VC) 6.25-5 MG/5ML syrup Take 5 mLs by mouth every 6 hours 7/23/2024    rimegepant (NURTEC) 75 MG ODT tablet Place 1 tablet (75 mg) under the tongue daily as needed for migraine Maximum of 1 tablet every 24 hours. More than a month    scopolamine (TRANSDERM) 1 MG/3DAYS 72 hr patch Place 1 patch onto the skin every 72 hours - Transdermal 7/24/2024    sennosides (SENOKOT) 8.8 MG/5ML syrup 5 mLs by Per J Tube route 2 times daily 7/23/2024    Sharps Container (BD SHARPS ) MISC 1 Container as needed     silver nitrate (ARZOL SILVER NIT APPLICATORS) 75-25 % miscellaneous Apply topically as needed for wound care Apply Silver  "Nitrate Sticks every 2-3 days until granulation tissue resolved.  \"Roll\" tip of Silver Nitrate Q tip directly onto the granulation tissue-bulging tissue area.  Have Agency or Home Care Nurse administer this, if you prefer.  Take care not to touch any healthy tissue or skin.  The tissue will turn white and eventually black & scab off.  May repeat if needed in the future for recurrence. Unknown    sodium phosphate, mineral oil, magnesium citrate enema Instill 1 pink 187 ml enema twice daily as needed for constipation 7/20/2024    STATIN NOT PRESCRIBED (INTENTIONAL) Previous liver issues, risks vs benefits felt to not warrant statin.    Discussed Oct 2022 visit Unknown    zinc oxide - white petrolatum (CRITIC-AID THICK MOIST BARRIER) 20-51% PSTE topical paste Apply 71 g topically every hour as needed for rash (Under J tube bumper when needed for skin protection) 7/23/2024    ZOLMitriptan (ZOMIG-ZMT) 5 MG ODT Take 1 tablet (5 mg) by mouth at onset of headache for migraine Dissolve tablet in the mouth. May repeat in 2 hours. Max 2 tablets/24 hours. 7/23/2024      "

## 2024-07-24 NOTE — PLAN OF CARE
OT: after chart review and conversation with PT post PT eval it is concluded that this pt has no acute OT needs. Pt currently I with basic ADL and only limitation is pain. Defer acute OT today.

## 2024-07-24 NOTE — H&P
[facility]    History and Physical - Hospitalist Service, GOLD TEAM        Date of Admission:  7/23/2024    Assessment & Plan      Dinora Mcghee is a 59 year old female admitted on 7/23/2024. 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 She was admitted for further monitoring and management.    Acute-on-Chronic Abdominal Pain  Severe Constipation  Hx of Gastroparesis (S/P G-tube & J-tube)  Pt w/ complicated past abdominal surgical history (see below). Approximately one week PTA developed pain around her G-tube site (on R side of abdomen). This was then exchanged on 7/17/24 by her home nursing team without issues. The tube is flushing appropriately, and no changes in baseline, chronic, mild drainage from site. No issues w/ J-tube site (on L side of abdomen). She discussed case w/ Dr. Park who recommended admission for evaluation of pain control and to exchange G-tube sooner so that she can get to her [facility] appt to discuss possible total colectomy 2/2 chronic, debilitating constipation (7/30/24). On admission, CBC and CMP WNL, remains afebrile. CT A/P in ED showed large stool burden, but no issues intraabdominally to correlate w/ pain. On exam, exquisite tenderness overlying G-tube site in R abdomen, but no other areas of focal tenderness. At this point, given her chronic constipation and issues w/ this, I am less inclined to believe that her acute pain is principally caused by this, especially given pinpoint tenderness over site and her reports of pain at  the site. However, interestingly, CT A/P was unrevealing for any acute abnormalities over the site.   - GI consult placed to address G-tube site  - Acute pain control:   - 5-10mg Oxycodone solution Q6H PRN for moderate pain   - 0.5mg IV Dilaudid for breakthrough pain   - Cannot utilize Tylenol or Ibuprofen given her reported reactions/intolerance to these in the past   - We discussed avoiding narcotics as much as possible given her severe constipation  - Continue chronic pain mgmt as below  - RD consulted to address TFs, can continue for tonight  - Continue to monitor G-tube site   - Constipation mgmt w/ PTA Naloxegol    - Notes she has BM typically ~every other day, last was on Sunday (2 days PTA)     GERD  PTA IV PPI (via her port).  - Continue PTA IV PPI      Hypothyroidism   - Continue PTA levothyroxine     H/o Migraines  Uses Botox injection and Zomig & at home.   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 Last A1C 5.6% on 03/2024. PTA Levemir 7 units at beginning of feeds.  - Continue Creon with meals  - For tonight, will place medium sliding scale Q4H to see her insulin needs through tonight/tomorrow AM  - Continue insulin with TPN/TFs  - RD consulted to help w/ TPN/TFs  - BG checks Q4H for now while ongoing TPN/TFs  - Hypoglycemia protocol     Chronic Pain   PTA Buprenorphine 2mg TID, Baclofen 20mg TID, medical cannabis. Follows w/ [facility], last saw 6/26/24 (see scans).   - Continue PTA Buprenorphine   - Continue PTA Baclofen   - Continue PTA Robaxin QID PRN  - Continue PTA Elavil at bedtime    Asplenic  Surgically absent after splenectomy in 2016. All cell lines WNL.-  - Noted          Diet: Combination Diet Regular Diet Adult    DVT Prophylaxis: Pneumatic Compression Devices  Piedra Catheter: Not  "present  Lines: PRESENT             Cardiac Monitoring: None  Code Status: No CPR- Do NOT Intubate    Clinically Significant Risk Factors Present on Admission                                         Disposition Plan     Medically Ready for Discharge: Anticipated in 2-4 Days         The patient's care was discussed with the Attending Physician, Dr. Addy Stanley, Bedside Nurse, and Patient.    Igor العلي PA-C  Hospitalist Service, [facility]  Securely message with [employer] (more info)  Text page via [employer] Paging/Directory   See signed in provider for up to date coverage information    ______________________________________________________________________    Chief Complaint   \"My G-tube site hurts\"    History is obtained from the patient    History of Present Illness   Dinora Mcghee is a 59 year old female who is seen in the ED this evening.  She reports approximately 8 days of focal pain at the G-tube site, reporting that her home health nurse exchanged this for her last Thursday.  However, despite this, she continues to experience pain at the site, worse with movement, but abates with lying still.  There is no pain at the J-tube site.  She acknowledges her constipation, as seen on her CT, and reports that this is a chronic issue, and she has no diffuse pain as a result of this.  She has been diligent with her bowel regimen and other PTA meds.  No changes in her chronic nausea.  Otherwise denies chest pain, dyspnea, headaches, vision changes, lower extremity pain or swelling, urinary issues.    Past Medical History    Past Medical History:   Diagnosis Date    Allergic rhinitis, cause unspecified     Allergy to other foods     strawberries, apples, celeries, alice, watermelon    Arthritis     left knee    Choledocholithiasis     long after cholecystectomy    Chronic abdominal pain     Chronic constipation     Chronic nausea     Chronic pancreatitis (H)     " Degeneration of lumbar or lumbosacral intervertebral disc     Esophageal reflux     w/ hiatal hernia    Gastroparesis     Hiatal hernia     History of pituitary adenoma     s/p resection    Hypothyroidism     Migraines     Mild hyperlipidemia     On tube feeding diet     presence of GJ tube    Pancreatic disease     PD stricture, 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 endometriosis growth    APPENDECTOMY       SECTION  1993    COLONOSCOPY  2014    COLONOSCOPY N/A 2023    Procedure: COLONOSCOPY, WITH POLYPECTOMY AND BIOPSY;  Surgeon: Percy Chamorro MD;  Location:  GI    ENDOSCOPIC INSERTION TUBE GASTROSTOMY N/A 2021    Procedure: Gastrojejonostomy placement with jejunopexy, PEG tube exchange;  Surgeon: Zackery Montoya MD;  Location:  OR    ENDOSCOPIC RETROGRADE CHOLANGIOPANCREATOGRAM N/A 2015    Procedure: ENDOSCOPIC RETROGRADE CHOLANGIOPANCREATOGRAM;  Surgeon: Mandeep Park MD;  Location:  OR    ENDOSCOPIC RETROGRADE CHOLANGIOPANCREATOGRAM N/A 2016    Procedure: COMBINED ENDOSCOPIC RETROGRADE CHOLANGIOPANCREATOGRAPHY, PLACE TUBE/STENT;  Surgeon: Mandeep Park MD;  Location:  OR    ENDOSCOPIC RETROGRADE CHOLANGIOPANCREATOGRAM N/A 2016    Procedure: COMBINED ENDOSCOPIC RETROGRADE CHOLANGIOPANCREATOGRAPHY, REMOVE FOREIGN BODY OR STENT/TUBE;  Surgeon: Mandeep Park MD;  Location: [facility] OR    ENDOSCOPIC RETROGRADE CHOLANGIOPANCREATOGRAM N/A 2016    Procedure: COMBINED ENDOSCOPIC RETROGRADE CHOLANGIOPANCREATOGRAPHY, PLACE TUBE/STENT;  Surgeon: Mandeep Park MD;  Location:  OR     ENDOSCOPIC RETROGRADE CHOLANGIOPANCREATOGRAM N/A 08/26/2016    Procedure: COMBINED ENDOSCOPIC RETROGRADE CHOLANGIOPANCREATOGRAPHY, REMOVE FOREIGN BODY OR STENT/TUBE;  Surgeon: Mandeep Park MD;  [facility]    ENDOSCOPIC ULTRASOUND UPPER GASTROINTESTINAL TRACT (GI) N/A 10/03/2016    Procedure: ENDOSCOPIC ULTRASOUND, ESOPHAGOSCOPY / UPPER GASTROINTESTINAL TRACT (GI);  Surgeon: Guru Jose Rodas MD;  [facility]    ENT SURGERY  1989    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    ENTEROSCOPY SMALL BOWEL N/A 02/03/2022    Procedure: ENTEROSCOPY with possible fistula closure;  Surgeon: Francisco Dodson MD;  Location:  GI    ESOPHAGOSCOPY, GASTROSCOPY, DUODENOSCOPY (EGD), COMBINED N/A 06/24/2015    Procedure: COMBINED ESOPHAGOSCOPY, GASTROSCOPY, DUODENOSCOPY (EGD), REMOVE FOREIGN BODY;  Surgeon: Mandeep Park MD;  Location:  GI    ESOPHAGOSCOPY, GASTROSCOPY, DUODENOSCOPY (EGD), COMBINED N/A 10/25/2015    Procedure: COMBINED ESOPHAGOSCOPY, GASTROSCOPY, DUODENOSCOPY (EGD);  Surgeon: Sammy Amaro MD;  Location:  GI    ESOPHAGOSCOPY, GASTROSCOPY, DUODENOSCOPY (EGD), COMBINED N/A 10/25/2015    Procedure: COMBINED ESOPHAGOSCOPY, GASTROSCOPY, DUODENOSCOPY (EGD), BIOPSY SINGLE OR MULTIPLE;  Surgeon: Sammy Amaro MD;  Location:  GI    ESOPHAGOSCOPY, GASTROSCOPY, DUODENOSCOPY (EGD), COMBINED N/A 01/31/2023    Procedure: ESOPHAGOGASTRODUODENOSCOPY (EGD) with peg replacement ;  Surgeon: Mandeep Park MD;  [facility]    ESOPHAGOSCOPY, GASTROSCOPY, DUODENOSCOPY (EGD), DILATATION, COMBINED      EXCISE LESION TRUNK N/A 04/17/2017    Procedure: EXCISE LESION TRUNK;  Removal of Abdominal Foreign Body;  Surgeon: Nestor Phoenix MD;  [facility]    HC ESOPH/GAS REFLUX TEST W NASAL IMPED >1 HR N/A 11/19/2015    Procedure: ESOPHAGEAL IMPEDENCE FUNCTION TEST WITH 24 HOUR PH GREATER THAN 1 HOUR;  Surgeon: Thiago Apple MD;  [facility] GI     UGI  ENDOSCOPY DIAG W BIOPSY  09/17/2008     UGI ENDOSCOPY DIAG W BIOPSY  09/27/2012     UGI ENDOSCOPY W ESOPHAGEAL DILATION BALLOON <30MM  09/17/2008     UGI ENDOSCOPY W EUS N/A 05/05/2015    Procedure: COMBINED ENDOSCOPIC ULTRASOUND, ESOPHAGOSCOPY, GASTROSCOPY, DUODENOSCOPY (EGD);  Surgeon: Wm Dueñas MD;  [facility]    HC WRIST ARTHROSCOP,RELEASE XVERS LIG Bilateral 12/17/2008    INJECT TRANSVERSUS ABDOMINIS PLANE (TAP) BLOCK BILATERAL Left 09/22/2016    Procedure: INJECT TRANSVERSUS ABDOMINIS PLANE (TAP) BLOCK BILATERAL;  Surgeon: Dickson Corrigan MD;  [facility]    INJECT TRIGGER POINT Bilateral 09/08/2022    Procedure: Abdominal trigger point injection with ultrasound;  Surgeon: Monika Mahajan MD;  [facility]    INJECT TRIGGER POINT SINGLE / MULTIPLE 3 OR MORE MUSCLES Right 11/15/2022    Procedure: Trigger point injections right abdomen with ultrasound guidance;  Surgeon: Monika Mahajan MD;  [facility]: [facility]    IR CHEST PORT PLACEMENT < 5 YRS OF AGE  06/10/2022    IR CVC TUNNEL PLACEMENT > 5 YRS OF AGE  4/15/2024    IR PORT REMOVAL RIGHT  11/09/2023    laparoscopic pineda  01/01/1995    LAPAROSCOPIC HERNIORRHAPHY INCISIONAL N/A 08/23/2018    Procedure: LAPAROSCOPIC HERNIORRHAPHY INCISIONAL;  Laparoscopic Incisional Hernia Repair with Symbotex Mesh Implant;  Surgeon: Nestor Phoenix MD;  [facility]    LAPAROSCOPIC PANCREATECTOMY, TRANSPLANT AUTO ISLET CELL N/A 12/28/2016    Procedure: LAPAROSCOPIC PANCREATECTOMY, TRANSPLANT AUTO ISLET CELL;  Surgeon: Nestor Phoenix MD;  [facility]    LAPAROTOMY EXPLORATORY N/A 01/31/2023    Procedure: Exploratory Laparotomy, lysis of adhesions, Perforated J-Junostomy Resection, Replace J-Junostomy site;  Surgeon: Elver Bauer MD;  [facility]    LAPAROTOMY, LYSIS ADHESIONS, COMBINED N/A 01/31/2023    Procedure: Dilatation of jejunostomy feeding tube, track with placement of jejunostomy tube with fluoroscopy;  Surgeon:  Elver Bauer MD;  [facility]    PICC DOUBLE LUMEN PLACEMENT Left 11/13/2023    Basilic Vein 5F DL 43 cm    REMOVE AND REPLACE BREAST IMPLANT PROSTHESIS N/A 12/30/2022    Procedure: Exploratory Laparotomy, lysis of adhesions, small bowel resection. Placement of gastric jejunostomy for enteral feeding.;  Surgeon: Elver Bauer MD;  [facility]    REMOVE GASTROSTOMY TUBE ADULT N/A 01/28/2022    Procedure: REMOVAL, GASTROSTOMY TUBE, ADULT;  Surgeon: Mandeep Park MD;  [facility]    REPLACE GASTROJEJUNOSTOMY TUBE, PERCUTANEOUS N/A 09/07/2021    Procedure: GASTROJEJUNOSTOMY TUBE PLACEMENT, PERCUTANEOUS, WITH GASTROPEXY;  Surgeon: Mandeep Park MD;  [facility]    REPLACE GASTROJEJUNOSTOMY TUBE, PERCUTANEOUS N/A 09/22/2021    Procedure: REPLACEMENT, GASTROJEJUNOSTOMY TUBE, PERCUTANEOUS;  Surgeon: Zackery Montoya MD;  [facility]    REPLACE GASTROJEJUNOSTOMY TUBE, PERCUTANEOUS N/A 11/22/2022    Procedure: REPLACEMENT, GASTROJEJUNOSTOMY TUBE, PERCUTANEOUS;  Surgeon: Mandeep Park MD;  [facility]    REPLACE GASTROJEJUNOSTOMY TUBE, PERCUTANEOUS N/A 12/09/2022    Procedure: REPLACEMENT, GASTROJEJUNOSTOMY TUBE, PERCUTANEOUS with ENDOSCOPIC SUTURING.;  Surgeon: Mandeep Park MD;  [facility]    REPLACE GASTROJEJUNOSTOMY TUBE, PERCUTANEOUS N/A 08/01/2023    Procedure: Replace Gastrojejunostomy Tube, Percutaneous;  Surgeon: Mandeep Park MD;  [facility]    REPLACE GASTROJEJUNOSTOMY TUBE, PERCUTANEOUS N/A 11/11/2023    Procedure: Replace Gastrojejunostomy Tube, Percutaneous;  Surgeon: Francisco Dodson MD;  [facility]    REPLACE GASTROJEJUNOSTOMY TUBE, PERCUTANEOUS N/A 12/8/2023    Procedure: Replace Gastrojejunostomy Tube, Percutaneous;  Surgeon: Mandeep Park MD;  [facility]    REPLACE JEJUNOSTOMY TUBE, PERCUTANEOUS N/A 09/10/2021    Procedure: UPPER ENDOSCOPY, REPLACEMENT OF PERCUTANEOUS GASTROJEJUNOSTOMY  TUBE, T-TAG GASTROPEXY;  Surgeon: Zackery Montoya MD;  [facility]    REPLACE JEJUNOSTOMY TUBE, PERCUTANEOUS N/A 2021    Procedure: REPLACEMENT, JEJUNOSTOMY TUBE, PERCUTANEOUS;  Surgeon: Mandeep Park MD;  [facility]    REPLACE JEJUNOSTOMY TUBE, PERCUTANEOUS N/A 2023    Procedure: REPLACEMENT, JEJUNOSTOMY TUBE, PERCUTANEOUS;  Surgeon: Mandeep Park MD;  [facility]    REPLACE JEJUNOSTOMY TUBE, PERCUTANEOUS  2023    Procedure: Replace Jejunostomy Tube, Percutaneous;  Surgeon: Mandeep Park MD;  [facility]    REPLACE JEJUNOSTOMY TUBE, PERCUTANEOUS N/A 2024    Procedure: REPLACEMENT, JEJUNOSTOMY TUBE, PERCUTANEOUS;  Surgeon: Francisco Dodson MD;  [facility]    transphenoidal pituitary resection  1990    [facility]  DELIVERY ONLY  1996    [facility]  DELIVERY ONLY  1998    repeat c section with incidental cystotomy with repair    [facility] EXCIS PITUITARY,TRANSNASAL/SEPTAL  1980    pituitary tumor removed for diabetes insipidus    [facility] TOTAL ABDOM HYSTERECTOMY  1999    w/ bilateral salpingoophorectomy        Prior to Admission Medications   Prior to Admission Medications   Prescriptions Last Dose Informant Patient Reported? Taking?   COMPOUNDED NON-CONTROLLED SUBSTANCE (CMPD RX) - PHARMACY TO MIX COMPOUNDED MEDICATION   No No   Sig: Place 1 enema rectally 2 times daily as needed (for constipation)   COMPOUNDED NON-CONTROLLED SUBSTANCE (CMPD RX) - PHARMACY TO MIX COMPOUNDED MEDICATION   No No   Sig: Administer 40 mg amitriptyline suspension (2 mg/mL) via G-J tube at bedtime.   COMPOUNDED NON-CONTROLLED SUBSTANCE (CMPD RX) - PHARMACY TO MIX COMPOUNDED MEDICATION   No No   Sig: Administer 60 mg amitriptyline 2 mg/ml via G-J tube at bedtime   Continuous Blood Gluc Sensor (FREESTYLE LISA 3 SENSOR) [facility]   No No   Sig: CHANGE EVERY 14 DAYS   DULoxetine HCl 60 MG CSDR   No No   Si capsule (60 mg) by Per J Tube route daily  Sprinkle caps for feeding tube   Digestive Enzyme Cartridge (RELIZORB) MARCO   No No   Si Cartridges daily   MOTEGRITY 2 MG tablet   No No   Sig: TAKE ONE TABLET BY MOUTH ONCE DAILY   Nutritional Supplements (BOOST HIGH PROTEIN) LIQD   No No   Sig: After above baseline labs are drawn, give: 6 mL/kg to maximum of 360 mL; the beverage is to be consumed within 5 minutes.   STATIN NOT PRESCRIBED (INTENTIONAL)   Yes No   Sig: Previous liver issues, risks vs benefits felt to not warrant statin.    Discussed Oct 2022 visit   Sharps Container (BD SHARPS ) MISC   No No   Si Container as needed   ZOLMitriptan (ZOMIG-ZMT) 5 MG ODT   No No   Sig: Take 1 tablet (5 mg) by mouth at onset of headache for migraine Dissolve tablet in the mouth. May repeat in 2 hours. Max 2 tablets/24 hours.   acetaminophen (TYLENOL) 325 MG/10.15ML solution   No No   Si.3 mLs (650 mg) by Per J Tube route every 6 hours as needed for mild pain or fever   baclofen (LIORESAL) 10 MG tablet   No No   Si-2 tablets (10-20 mg) by Per G Tube route 3 times daily   buprenorphine (SUBUTEX) 2 MG SUBL sublingual tablet   Yes No   Sig: Place 2 mg under the tongue 2 times daily   cetirizine (ZYRTEC) 10 MG tablet   No No   Si tablet (10 mg) by Per G Tube route 2 times daily   diphenhydrAMINE (BENADRYL) 12.5 MG/5ML solution   Yes No   Sig: Take 25 mg by mouth 4 times daily as needed for other (nausea)   droNABinol (SYNDROS) 5 MG/ML oral soln   No No   Sig: Take 0.5 mLs (2.5 mg) by mouth 2 times daily   estradiol (VAGIFEM) 10 MCG TABS vaginal tablet   No No   Sig: INSERT 1 TABLET(10 MCG) VAGINALLY 2 TIMES A WEEK   famotidine (PEPCID) 40 MG/5ML suspension   No No   Si.5 mLs (20 mg) by Per J Tube route daily   glucagon 1 MG kit   No No   Sig: Give 0.1 to 0.15mg( 10-15 units on Insulin sryinge) subcutaneous  every 15 minutes PRN for hypoglycemia. Remix new kit q24hr. Needs up to 3 kit/week.   glucose 40 % GEL gel   No No   Sig: Take 15-30 g  "by mouth every 15 minutes as needed for low blood sugar   insulin aspart (NOVOLOG FLEXPEN) 100 UNIT/ML pen   No No   Sig: Take 1 unit if BG is 175-275, and 2 units if >275 up to every 4 hours as needed.  \"Prime\" pen needle with 2 unit airshot before giving, needs supply for 15 unit/day.   insulin aspart (NOVOLOG PEN) 100 UNIT/ML pen   No No   Sig: Take 1 unit if BG is 175-275, and 2 units if >275 up to every 4 hours as needed.  \"Prime\" pen needle with 2 unit airshot before giving, needs supply for 15 unit/day.   insulin detemir (LEVEMIR PEN) 100 UNIT/ML pen   No No   Sig: Take 7 to 10 units per day, adjust as directed by MD.  Do 2 unit 'prime' before dosing, needs 12 units/day supply.   insulin syringe-needle U-100 (30G X 1/2\" 0.3 ML) 30G X 1/2\" 0.3 ML miscellaneous   No No   Sig: Use 3 syringes daily or as directed.   lactated ringers infusion   Yes No   Sig: Inject 1,000 mLs into the vein daily as needed   levothyroxine (SYNTHROID) 25 mcg/mL SUSP   No No   Si.48 mLs (137 mcg) by Per J Tube route daily   lidocaine (LIDODERM) 5 % patch   Yes No   Sig: Place onto the skin every 24 hours To prevent lidocaine toxicity, patient should be patch free for 12 hrs daily.   lipase-protease-amylase (CREON) 52288-35976-154439 units CPEP per EC capsule   No No   Sig: Take 3-4 capsules by mouth 3 times daily (with meals) With oral meals   methocarbamol (ROBAXIN) 750 MG tablet   No No   Sig: Take 1 tablet (750 mg) by mouth 4 times daily as needed for muscle spasms   naloxegol (MOVANTIK) 12.5 MG TABS tablet   Yes No   Sig: Take 25 mg by mouth every morning (before breakfast)   pantoprazole (PROTONIX) 40 mg IV push injection   No No   Sig: Inject 40 mg into the vein daily (with breakfast)   prochlorperazine (COMPAZINE) 10 MG tablet   No No   Sig: Take 1 tablet (10 mg) by mouth every 6 hours as needed for nausea or vomiting   prochlorperazine (COMPRO) 25 MG suppository   No No   Sig: Place 1 suppository (25 mg) rectally every 12 " "hours as needed for nausea   promethazine 25 mg   Yes No   Sig: Inject 25 mg into the vein 3 times daily   rimegepant (NURTEC) 75 MG ODT tablet   No No   Sig: Place 1 tablet (75 mg) under the tongue daily as needed for migraine Maximum of 1 tablet every 24 hours.   scopolamine (TRANSDERM) 1 MG/3DAYS 72 hr patch   No No   Sig: Place 1 patch onto the skin every 72 hours - Transdermal   sennosides (SENOKOT) 8.8 MG/5ML syrup   No No   Si mLs by Per J Tube route 2 times daily   silver nitrate (ARZOL SILVER NIT APPLICATORS) 75-25 % miscellaneous   No No   Sig: Apply topically as needed for wound care Apply Silver Nitrate Sticks every 2-3 days until granulation tissue resolved.  \"Roll\" tip of Silver Nitrate Q tip directly onto the granulation tissue-bulging tissue area.  Have Agency or Home Care Nurse administer this, if you prefer.  Take care not to touch any healthy tissue or skin.  The tissue will turn white and eventually black & scab off.  May repeat if needed in the future for recurrence.   sodium phosphate, mineral oil, magnesium citrate enema   No No   Sig: Instill 1 pink 187 ml enema twice daily as needed for constipation   zinc oxide - white petrolatum (CRITIC-AID THICK MOIST BARRIER) 20-51% PSTE topical paste   No No   Sig: Apply 71 g topically every hour as needed for rash (Under J tube bumper when needed for skin protection)      Facility-Administered Medications Last Administration Doses Remaining   Botulinum Toxin Type A (BOTOX) 200 units injection 155 Units 2024  5:03 PM    Botulinum Toxin Type A (BOTOX) 200 units injection 155 Units None recorded            Review of Systems    The 10 point Review of Systems is negative other than noted in the HPI or here.     Social History   I have reviewed this patient's social history and updated it with pertinent information if needed.  Social History     Tobacco Use    Smoking status: Former     Current packs/day: 0.00     Average packs/day: 0.5 packs/day " for 6.3 years (3.2 ttl pk-yrs)     Types: Cigarettes     Start date: 1985     Quit date: 1992     Years since quittin.5    Tobacco comments:     no 2nd hand   Vaping Use    Vaping status: Some Days    Substances: THC, CBD   Substance Use Topics    Alcohol use: Not Currently     Alcohol/week: 3.0 - 6.0 standard drinks of alcohol     Types: 1 - 2 Glasses of wine, 1 - 2 Cans of beer, 1 - 2 Shots of liquor per week     Comment: none since IGG    Drug use: No     Comment: medical canabis tincture and vape        Physical Exam   Vital Signs: Temp: 97.6  F (36.4  C) Temp src: Oral BP: 102/66 Pulse: 98   Resp: 20 SpO2: 99 % O2 Device: None (Room air)    Weight: 0 lbs 0 oz    Constitutional: awake, alert, cooperative, no apparent distress, and appears stated age  Eyes: lids and lashes normal, sclera clear, and conjunctiva normal  ENT: normocephalic, without obvious abnormality  Respiratory: No increased work of breathing, good air exchange, clear to auscultation bilaterally, no crackles or wheezing  Cardiovascular: regular rate and rhythm, normal S1 and S2, no S3, no S4, and no murmur noted  GI: prior surgical scars notes, well-healed. G-tube and J-tube sites noted without swelling, erythema or drainage. hypoactive bowel sounds, soft, non-distended, and exquisite tenderness overlying RUQ. No other areas of abd tenderness.  Skin: no bruising or bleeding, no redness, warmth, or swelling, no rashes, and no lesions  Musculoskeletal: no lower extremity pitting edema present  Neurologic: Moving all extremities equally and spontaneously. No obvious focal neuro deficits.  Neuropsychiatric: General: normal, calm, and normal eye contact  Level of consciousness: alert / normal  Affect: normal and pleasant  Memory and insight: normal, memory for past and recent events intact, and thought process normal    Medical Decision Making       120 MINUTES SPENT BY ME on the date of service doing chart review, history, exam,  documentation & further activities per the note.      Data     I have personally reviewed the following data over the past 24 hrs:    7.8  \   12.6   / 428     139 102 21.1 (H) /  108 (H)   4.8 28 0.62 \     ALT: 23 AST: 25 AP: 102 TBILI: 0.4   ALB: 3.9 TOT PROTEIN: 6.8 LIPASE: N/A     Procal: N/A CRP: N/A Lactic Acid: 0.9         Imaging results reviewed over the past 24 hrs:   Recent Results (from the past 24 hour(s))   CT Abdomen Pelvis w Contrast    Narrative    EXAMINATION: CT ABDOMEN PELVIS W CONTRAST, 7/23/2024 7:56 PM    INDICATION: moderate to severe pain at g-tube site/ RUQ abdomen    COMPARISON STUDY: Fluoroscopic images 4/16/2024, CT abdomen and pelvis  11/8/2023    TECHNIQUE: CT scan of the abdomen and pelvis was performed on  multidetector CT scanner using volumetric acquisition technique and  images were reconstructed in multiple planes with variable thickness  and reviewed on dedicated workstations.     CONTRAST: iopamidol (ISOVUE-370) solution 77mL injected IV without  oral contrast    CT scan radiation dose is optimized to minimum requisite dose using  automated dose modulation techniques.    FINDINGS:    Lower thorax: Heart size is normal without pericardial effusion. Trace  basilar atelectasis. Calcified left lower lobe granuloma.    Liver: No mass. No intrahepatic biliary ductal dilation.    Biliary System: Cholecystectomy. No extrahepatic biliary ductal  dilation.    Pancreas: Surgically absent.    Adrenal glands: No mass or nodules    Spleen: Surgically absent.    Kidneys: No suspicious mass, obstructing calculus or hydronephrosis.    Gastrointestinal tract : Percutaneous gastrostomy tube is located  within the gastric lumen with no focal abnormality the gastrostomy  tract. Percutaneous jejunostomy in the left midabdomen with tip in the  jejunum. No abnormality of the jejunostomy tract. A few prominent  loops of small bowel at the small bowel anastomosis, similar compared  to prior. Small  bowel is otherwise within normal limits. Large stool  burden within the colon with gaseous distention of the rectum.  Appendectomy.    Mesentery/peritoneum/retroperitoneum: No mass. No free fluid or air.    Lymph nodes: No significant lymphadenopathy.    Vasculature: Patent major abdominal vasculature.    Pelvis: Urinary bladder is distended.  Prior hysterectomy.    Osseous structures: No aggressive or acute osseous lesion.  L2  vertebral body hemangioma, unchanged.      Soft tissues: Within normal limits.      Impression    IMPRESSION:   1. Normal appearance of the percutaneous gastrostomy and percutaneous  jejunostomy with appropriate positioning.  2. Large colonic stool burden, which may indicate constipation.    I have personally reviewed the examination and initial interpretation  and I agree with the findings.    CARINE BOYD DO         SYSTEM ID:  [id number]     Recent Labs   Lab 07/23/24  1708   WBC 7.8   HGB 12.6   MCV 96         POTASSIUM 4.8   CHLORIDE 102   CO2 28   BUN 21.1*   CR 0.62   ANIONGAP 9   KASSI 8.8   *   ALBUMIN 3.9   PROTTOTAL 6.8   BILITOTAL 0.4   ALKPHOS 102   ALT 23   AST 25

## 2024-07-24 NOTE — PROGRESS NOTES
Brief GI note:     Procedure (7/24/2024): S/p EGD with Dr. Montoya   Findings:       The patient was left lateral under moderate sedation and a gastroscope       was used. The esophagus was notable for a widely patent, mild distal       Schatzki ring. The stomach was notable for the mature gastrostomy tract       with the balloon across the pylorus to the duodenum. The balloon was       deflated and the tube withdrawn a couple centimeters to allow the       balloon to be re-expanded with 7cc of water and seat against the healthy       appearing stomach wall.     Post-procedure exam at 16:20  Gen: Lying in bed. Appears comfortable  Resp: non-labored work of breathing on RA  Abdomen: Soft, non-distended.  Improved pain around PEG site and remains without any erythema/no bilious nor purulent drainage. 18 Fr [employer] Gtube (with 20 mL balloon) remains in place with external bumper exiting skin at 3 cm (top of bumper at 4 cm marker).  Gtube and Jtube ports both capped.    Interval hx: Patient reports no further abd pain.  Still without BM, not yet received enema.  Asking if can go home after enema if continues to have improved abd pain.      RECOMMENDATIONS:  *See previous consult note earlier today (7/24) for additional recommendations.  - Proceed with administration of Gastrograffin enema per previous plan for constipation.  - Education completed with patient on monitoring the markings on her PEG and to notify GI if not staying at above noted markings (3 cm at skin, top of the bumper at 4 cm). Entered this onto discharge summary instructions as well.  - If suspect Gtube becomes malpositioned again, may need to exchange or replace current tube with different/smaller balloon tube (e.g, Avanos KISHORE 18 Fr, 7-10 mL balloon).  -If patient without further abd pain after complete enema, can discharge home from GI perspective on 7/24 PM.  If remains admitted, panc/bili team will continue to follow.  -If to remain  admitted, restart TF per RD recs/orders, restart TPN per RD/Pharmacy orders and resume diet as tolerates.    Communicated above to pt, bedside RN and primary medicine team (ANA Novak)      Antoinette Araujo PA-C, RD  Inpatient Gastroenterology Service  [facility]  Pager: [phone]  Text Page

## 2024-07-24 NOTE — PROGRESS NOTES
"CLINICAL NUTRITION SERVICES - ASSESSMENT NOTE     Nutrition Prescription    RECOMMENDATIONS FOR MDs/PROVIDERS TO ORDER:  Please place consult for \"Pharmacy/Nutrition to Start and Manage PN\" if wanting to restart supplemental home PN  Optimize bowel regimen/antiemetics  Malnutrition Status:    Severe malnutrition in the context of chronic illness/disease    Recommendations already ordered by Registered Dietitian (RD):  EN access: GJ tube   Goal TF: [facility] Peptide 1.5 goal of 4.4 cartons total per day or 1440ml + 1 pkt Prosource TF 20. Start with continuous feeds of 50ml/hr(per pt preference), adjust rate as pt allows for goal of 1440 ml. At 50 ml/hr x24hrs provides 1200, 1880 kcals, 166 g CHO, 18 g fiber, 109 g protein, 840ml free water. Condense as able/tolerated to [facility] Peptide 1.5 120 ml/hr x 12hrs.  60 ml Q4H fluid flushes for tube patency. Additional fluids and/or adjustments per MD.    PO intake as tolerated.    Future/Additional Recommendations:      To re-start supplemental PN,  Dosing weight:  53.6 kg per [facility]  Access: CVC    Initial parameters (per day)  Volume:  1000 mL x 24hrs   Dextrose: 100 g  AA: 60 g  Lipids: none    Additives: infuvite 10 ml, tralement 1 ml. Additional per PharmD.    Goal PN provides 100 g dextrose, 60 g AA  for total provision of 580 Kcals (10 Kcals/kg), 1 g/kg protein, GIR 1.22 mg/kg/minute.        REASON FOR ASSESSMENT  Dinora Mcghee is a/an 59 year old female assessed by the dietitian for Provider Order - Registered Dietitian to Assess and Order TF per Medical Nutrition Therapy Protocol    Patient admitted for increased abdominal pain. She was admitted for further monitoring and management.     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 Pt reports previously she had been on Vital 1.5. Now on [employer] Peptide 1.5. Pt reports she has been running TF's at a lower rate depending on how she is feeling and that she will run it for additional hours to get goal volume. Pt reports improvement in bloating with [employer] but is unable to tolerate high rates. Pt endorses nausea without vomiting. Endorses constipation. Pt reports improvement in mental clarity with initiation of supplemental TPN. Additionally pt reports she believes she has gained a couple lbs since starting supplemental TPN. Pt has been trying to avoid dairy and reports an improvement in bloating since cutting dairy out. Occasionally has small amounts of food such as tater tots but often tastes food and then spits them out so they do not clog her tube. Previously received LR with TF's but has not required this since starting supplemental TPN. Reports apple juice allergy is related to a tree allergy and she also cannot have apples or strawberries.  Per RN, pt declining to run TF at a rate greater than 50 ml/hr. Pt will be unable to meet volume goal of 1440 ml at this rate.  Per [facility]: Pt reports running feeds at 50-65 ml/hr as this is what she can tolerate but runs longer to get in full volume.  6/5/24  [employer] Alert Peptide 1.5 120 ml/hr x 12 hrs (4.4 cartons per day) plus 1 packet Prosource TF.  She is also on supplemental PN d/t malabsorption/wt loss.  Dosing wt 53.6 kg, AA 60 gm, dex 100 gm, no lipids.  Volume 1000 ml x 24 hrs.  K 20 meq, ca 4.5 meq, mag 5 meq, phos 10 mmole, Cl 61% (max), infuvite 10 ml, tralement 1 ml.  CURRENT NUTRITION ORDERS  Diet: Regular    Intake/Tolerance: No documented intakes to assess.    LABS  BUN 21.8  Glu 130    MEDICATIONS  Dronabinol  Enema  Estradiol  Pepcid  Insulin  Levothyroxine  Creon 24 3-4 capsules with meals (1812-6413  "units lipase/kg/meal)  Movantik  Phenergan  Mj Das    ANTHROPOMETRICS  Height: 5'8\"  Most Recent Weight:      IBW: 63.6 kg  BMI of 19.01 kg/m2 BMI Category: Normal BMI  Weight History:  .8 kg (1%) weight loss over ~ 3 months.  Wt Readings from Last 20 Encounters:   07/22/24 56.7 kg (125 lb)   06/05/24 57.2 kg (126 lb)   05/30/24 56.2 kg (124 lb)   04/16/24 57.5 kg (126 lb 12.2 oz)   03/20/24 57 kg (125 lb 11.2 oz)   03/20/24 55.8 kg (123 lb)   03/13/24 56.9 kg (125 lb 8 oz)   03/06/24 56.2 kg (124 lb)   02/05/24 58.4 kg (128 lb 12.8 oz)   12/18/23 57 kg (125 lb 9.6 oz)   12/14/23 56.7 kg (125 lb)   12/07/23 56.7 kg (125 lb)   11/29/23 58.7 kg (129 lb 6.4 oz)   11/16/23 56.2 kg (124 lb)   11/14/23 57.4 kg (126 lb 9.6 oz)   11/08/23 56.7 kg (125 lb)   10/26/23 59.9 kg (132 lb)   10/17/23 59.4 kg (131 lb)   10/04/23 59.5 kg (131 lb 1.6 oz)   09/07/23 60.6 kg (133 lb 8 oz)         ASSESSED NUTRITION NEEDS  Dosing Weight: 56.7 kg (Actual BW)   Estimated Energy Needs: 4829-0440+ kcals/day (30 - 35 kcals/kg )  Justification: Increased needs  Estimated Protein Needs: 68- grams protein/day (1.2 - 1.5 - 2 grams of pro/kg)  Justification: Increased needs  Estimated Fluid Needs:  (1 mL/kcal)   Justification: Maintenance    PHYSICAL FINDINGS  See malnutrition section below.    Martinez Score: 20  Per EMR: Skin  Skin WDL: .WDL except, integrity  Skin Integrity:  (g and j tube)    MALNUTRITION  % Intake: Decreased intake does not meet criteria  % Weight Loss: Weight loss does not meet criteria for malnutrition   Subcutaneous Fat Loss: Facial region:  severe   Muscle Loss: Thoracic region (clavicle, acromium bone, deltoid, trapezius, pectoral):  severe  Fluid Accumulation/Edema: None noted  Malnutrition Diagnosis: Severe malnutrition in the context of chronic illness/disease    NUTRITION DIAGNOSIS  Altered GI function related to complex surgical history as evidenced by reliance on TF, PO and TPN to maintain " weight.      INTERVENTIONS  Implementation  Nutrition Education: will be provided if nutrition education needs arise.  and Nutrition Education: Introduced role of RD   Enteral Nutrition - Initiate  Feeding tube flush  Multivitamin/mineral supplement therapy  Parenteral Nutrition/IV Fluids - provide recs      Goals  Total avg nutritional intake to meet a minimum of 30 kcal/kg and 1.2 g PRO/kg daily (per dosing wt 56.7 kg).        Monitoring/Evaluation  Progress toward goals will be monitored and evaluated per protocol.  Africa Juan MS, RD, LD, [facility]    6C (beds 7059-6211) + 7C (beds 7542-9471) + ED   Available in [facility] by name or unit dietitian

## 2024-07-24 NOTE — OP NOTE
Upper GI Endoscopy 07/24/2024  1:05 PM [facility]   [address] [place] [address] [phone]     Endoscopy Department  _______________________________________________________________________________  Patient Name: Dinora Mcghee           Procedure Date: 7/24/2024 1:05 PM  MRN: [id number]                       Account Number: [id number]  YOB: 1965              Admit Type: Inpatient  Age: 59                                Gender: Female  Note Status: Finalized                Attending MD: NASIM MARTINEZ MD,  Total Sedation Time:                    _______________________________________________________________________________     Procedure:             Upper GI endoscopy  Indications:           Epigastric abdominal pain  Providers:             NASIM MARTINEZ MD, Argentina Rodriguez RN  Patient Profile:       Dinora Mcghee is a 59 year old woman well known to                         both the luminal and advanced GI services due to                         altered anatomy and failure to thrive in the settting                         of TPAIT with duodenojejunostomy. She presents with                         abdominal pain and there is suggestion of                         malpositioned G tube. Her J tube appears well                         positioned and she without related complaint.  Referring MD:          TRUNG DIALLO MD  Medicines:             Fentanyl 50 micrograms IV, Midazolam 2 mg IV  Complications:         No immediate complications.  _______________________________________________________________________________  Procedure:             Pre-Anesthesia Assessment:                         - Prior to the procedure, a History and Physical was                         performed, and patient medications and allergies were                         reviewed. The patient is competent. The risks and                         benefits of  the procedure and the sedation options and                         risks were discussed with the patient. All questions                         were answered and informed consent was obtained.                         Patient identification and proposed procedure were                         verified by the nurse in the pre-procedure area.                         Mental Status Examination: alert and oriented. Airway                         Examination: Mallampati Class II (the uvula but not                         tonsillar pillars visualized). Respiratory                         Examination: clear to auscultation. CV Examination:                         normal. ASA Grade Assessment: III - A patient with                         severe systemic disease. After reviewing the risks and                         benefits, the patient was deemed in satisfactory                         condition to undergo the procedure. The anesthesia                         plan was to use moderate sedation / analgesia                         (conscious sedation). Immediately prior to                         administration of medications, the patient was                         re-assessed for adequacy to receive sedatives. The                         heart rate, respiratory rate, oxygen saturations,                         blood pressure, adequacy of pulmonary ventilation, and                         response to care were monitored throughout the                         procedure. The physical status of the patient was                         re-assessed after the procedure. After obtaining                         informed consent, the endoscope was passed under                         direct vision. Throughout the procedure, the patient's                         blood pressure, pulse, and oxygen saturations were                         monitored continuously. The gastroscope was introduced                         through the mouth, and  advanced to the second part of                         duodenum. The upper GI endoscopy was accomplished                         without difficulty. The patient tolerated the                         procedure well.                                                                                   Findings:       The patient was left lateral under moderate sedation and a gastroscope       was used. The esophagus was notable for a widely patent, mild distal       Schatzki ring. The stomach was notable for the mature gastrostomy tract       with the balloon across the pylorus to the duodenum. The balloon was       deflated and the tube withdrawn a couple centimeters to allow the       balloon to be re-expanded with 7cc of water and seat against the healthy       appearing stomach wall.                                                                                   Moderate Sedation:       Moderate (conscious) sedation was administered by the nurse and       supervised by the endoscopist. The patient's oxygen saturation, heart       rate, blood pressure and response to care were monitored. Total       physician intraservice time was 4 minutes.  Impression:            - Nonobstructive Schatzki ring                         - Uncomplicated repositioning of malpositioned G tube                         balloon bumper                         - No evidence of ulceration or other mucosal                         abnormality  Recommendation:        - Moderate sedation recovery with return to the floor                         when appropriate                         - All medications, diet and activity may resume when                         the effect of sedation resolves                         - G tube and J tube may be used as previous                         - The findings and recommendations were discussed with                         the patient                                                                                        ________________________  NASIM MARTINEZ MD  7/24/2024 3:01:16 PM  Number of Addenda: 0

## 2024-07-24 NOTE — PROGRESS NOTES
[facility]    Medicine Progress Note - Hospitalist Service, GOLD TEAM 11    Date of Admission:  7/23/2024    Assessment & Plan   Dinora Mcghee is a 59 year old female admitted on 7/23/2024. 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 She was admitted for further monitoring and management.    # Acute-on-Chronic Abdominal Pain  # Severe Constipation  # Hx of Gastroparesis (S/P G-tube & J-tube)  Pt w/ complicated past abdominal surgical history (see below). Approximately one week PTA developed pain around her G-tube site (on R side of abdomen). This was then exchanged on 7/17/24 by her home nursing team without issues. The tube is flushing appropriately, and no changes in baseline, chronic, mild drainage from site. No issues w/ J-tube site (on L side of abdomen). She discussed case w/ Dr. Park who recommended admission for evaluation of pain control and to exchange G-tube sooner so that she can get to her [facility] appt to discuss possible total colectomy 2/2 chronic, debilitating constipation (7/30/24). On admission, CBC and CMP WNL, remains afebrile. CT A/P in ED showed large stool burden, but no issues intraabdominally to correlate w/ pain. On exam, exquisite tenderness overlying G-tube site in R abdomen, but no other areas of focal tenderness. At this point, given her chronic constipation and issues w/ this, I am less inclined to believe that her acute pain is principally caused by this, especially given pinpoint tenderness over site and her reports of pain  at the site. However, interestingly, CT A/P was unrevealing for any acute abnormalities over the site.   - GI consulted - see note for full details  - Interrogated 18 Fr Gtube with 20 mL balloon - only with ~12 ml total. Retracted PEG tube back to 4 cm marker initially and tube still loose and lightly touching the skin, re-inflated balloon with 11 mL. No acute pain upon re-examination 1 hour later.  - EGD today to eval for etiology of pain with PEG, likely exchange of PEG (with smaller balloon). Repositioned Gtube (balloon had traveled across pylorus and was pulling tube in causing pain)  - Post procedure, complete Gastrografin Enema (GGE) at bedside  - RD consulted, appreciate recs and orders to resume TF and PERT via Jtube post-procedure and once pain improves.  - Post GGE, resume PTA bowel regimen:               - Amitriptyline 60 m q HS.  - Pink lady enemas PRN (per pt administers every other day)               - Senna 8.8 mg BID.               - Miralax 17 gm BID via J-tube.               - Pelvic floor exercises per PT  - Acute pain control:  - 5-10mg Oxycodone solution Q6H PRN for moderate pain              - 0.5mg IV Dilaudid for breakthrough pain  - Constipation mgmt w/ PTA Naloxegol   - Notes she has BM typically ~every other day, last was on Sunday (2 days PTA)     # Hx of Chronic Pancreatitis (S/P total pancreatectomy + Islet cell transplant 2016)  # Insulin-dependent Diabetes Mellitus 2/2 absence of pancreas  Typically there is insulin in her TPN. Last A1C 5.6% on 03/2024. PTA Levemir 7 units at beginning of feeds. BG .  - Insulin detemir 3 units QD  - Continue Creon with meals  - Medium dose sliding scale insulin Q4H  - Continue insulin with TPN/TFs  - RD consulted to help w/ TPN/TFs  - Hypoglycemia protocol      # Chronic Pain   PTA Buprenorphine 2mg TID, Baclofen 20mg TID, medical cannabis. Follows w/ [facility], last saw 6/26/24 (see scans).   - Continue PTA Buprenorphine   -  Continue PTA Baclofen   - Continue PTA Robaxin QID PRN  - Continue PTA Elavil at bedtime     # Asplenic  Surgically absent after splenectomy in 2016. All cell lines WNL.    # GERD: Continue PTA IV PPI (via her port)   # Hypothyroidism: Continue PTA levothyroxine  # H/o Migraines: Uses Botox injections, PTA Zomig  # Seasonal Allergies: Continue PTA Zyrtec and Singulair        Diet: Combination Diet Regular Diet Adult    DVT Prophylaxis: Pneumatic Compression Devices  Piedra Catheter: Not present  Lines: PRESENT      CVC Double Lumen Right Internal jugular Tunneled-Site Assessment: WDL      Cardiac Monitoring: None  Code Status: No CPR- Do NOT Intubate      Clinically Significant Risk Factors Present on Admission          # Hypocalcemia: Lowest Ca = 8.3 mg/dL in last 2 days, will monitor and replace as appropriate     # Hypoalbuminemia: Lowest albumin = 3.4 g/dL at 7/24/2024  6:00 AM, will monitor as appropriate                          Disposition Plan     Medically Ready for Discharge: Anticipated Tomorrow       The patient's care was discussed with the Attending Physician, Dr. Rubalcava, Bedside Nurse, Patient, and GI Team.    Argentina Dc PA-C  Hospitalist Service, GOLD TEAM [address] [place] [address]  Securely message with [employer] (more info)  Text page via [employer] Paging/Directory   See signed in provider for up to date coverage information  ______________________________________________________________________    Interval History   Dinora reports continued pain at the G-tube site. This is worse with movement, but improves with lying still. No pain at the J-tube site. No changes in her chronic nausea. Otherwise denies chest pain, dyspnea, headaches, vision changes, lower extremity pain or swelling, urinary issues.    Physical Exam   Vital Signs: Temp: 97.7  F (36.5  C) Temp src: Oral BP: 90/55 Pulse: 98   Resp: 16 SpO2: 97 % O2 Device: None (Room air)    Weight: 0 lbs 0  oz    Constitutional: Awake, alert, cooperative, no apparent distress, and appears stated age  ENT: normocephalic, without obvious abnormality  Respiratory: No increased work of breathing, good air exchange, clear to auscultation bilaterally, no crackles or wheezing  Cardiovascular: regular rate and rhythm, normal S1 and S2, no S3, no S4, and no murmur noted  GI: prior surgical scars notes, well-healed. G-tube and J-tube sites noted without swelling, erythema or drainage. hypoactive bowel sounds, soft, non-distended, and exquisite tenderness overlying RUQ. No other areas of abd tenderness.  Skin: no bruising or bleeding, no redness, warmth, or swelling, no rashes, and no lesions  Musculoskeletal: no lower extremity pitting edema present  Neurologic: Moving all extremities equally and spontaneously. No obvious focal neuro deficits.  Neuropsychiatric: General: normal, calm, and normal eye contact  Level of consciousness: alert / normal  Affect: normal and pleasant  Memory and insight: normal, memory for past and recent events intact, and thought process normal    Medical Decision Making       60 MINUTES SPENT BY ME on the date of service doing chart review, history, exam, documentation & further activities per the note.      Data     I have personally reviewed the following data over the past 24 hrs:    6.6  \   12.0   / 404     137 101 21.8 (H) /  93   4.6 28 0.59 \     ALT: 20 AST: 20 AP: 93 TBILI: 0.2   ALB: 3.4 (L) TOT PROTEIN: 6.1 (L) LIPASE: N/A     TSH: N/A T4: N/A A1C: 5.4     Procal: N/A CRP: N/A Lactic Acid: 0.9         Imaging results reviewed over the past 24 hrs:   Recent Results (from the past 24 hour(s))   CT Abdomen Pelvis w Contrast    Narrative    EXAMINATION: CT ABDOMEN PELVIS W CONTRAST, 7/23/2024 7:56 PM    INDICATION: moderate to severe pain at g-tube site/ RUQ abdomen    COMPARISON STUDY: Fluoroscopic images 4/16/2024, CT abdomen and pelvis  11/8/2023    TECHNIQUE: CT scan of the abdomen and  pelvis was performed on  multidetector CT scanner using volumetric acquisition technique and  images were reconstructed in multiple planes with variable thickness  and reviewed on dedicated workstations.     CONTRAST: iopamidol (ISOVUE-370) solution 77mL injected IV without  oral contrast    CT scan radiation dose is optimized to minimum requisite dose using  automated dose modulation techniques.    FINDINGS:    Lower thorax: Heart size is normal without pericardial effusion. Trace  basilar atelectasis. Calcified left lower lobe granuloma.    Liver: No mass. No intrahepatic biliary ductal dilation.    Biliary System: Cholecystectomy. No extrahepatic biliary ductal  dilation.    Pancreas: Surgically absent.    Adrenal glands: No mass or nodules    Spleen: Surgically absent.    Kidneys: No suspicious mass, obstructing calculus or hydronephrosis.    Gastrointestinal tract : Percutaneous gastrostomy tube is located  within the gastric lumen with no focal abnormality the gastrostomy  tract. Percutaneous jejunostomy in the left midabdomen with tip in the  jejunum. No abnormality of the jejunostomy tract. A few prominent  loops of small bowel at the small bowel anastomosis, similar compared  to prior. Small bowel is otherwise within normal limits. Large stool  burden within the colon with gaseous distention of the rectum.  Appendectomy.    Mesentery/peritoneum/retroperitoneum: No mass. No free fluid or air.    Lymph nodes: No significant lymphadenopathy.    Vasculature: Patent major abdominal vasculature.    Pelvis: Urinary bladder is distended.  Prior hysterectomy.    Osseous structures: No aggressive or acute osseous lesion.  L2  vertebral body hemangioma, unchanged.      Soft tissues: Within normal limits.      Impression    IMPRESSION:   1. Normal appearance of the percutaneous gastrostomy and percutaneous  jejunostomy with appropriate positioning.  2. Large colonic stool burden, which may indicate constipation.    I  have personally reviewed the examination and initial interpretation  and I agree with the findings.    CARINE BOYD DO         SYSTEM ID:  [id number]

## 2024-07-24 NOTE — PLAN OF CARE
Goal Outcome Evaluation:    [id number]  BP 90/55   Pulse 98   Temp 97.7  F (36.5  C) (Oral)   Resp 16   SpO2 97%     Pt. A&O, VSS  C/o peg tube site pain, Gave prn Robaxin  Up independently to bathroom  TPN and TF dependent. Pt is infusing her home TPN and TF via g-j tube. Provider and Pharmacy aware  Reg diet  No bm, voiding via bathroom with SBA  DL CVC, SL  No acute, continue w/poc

## 2024-07-24 NOTE — PROVIDER NOTIFICATION
Paged Med Gold:    Pt is requesting Dilaudid for pain. She is rating her pain as 9/10. I have already given her Robaxin. Thank you

## 2024-07-24 NOTE — PLAN OF CARE
Physical Therapy defer - Orders received, chart reviewed, discussed with care team, met with patient. No IP PT needs indicated. Patient in with abd pain, otherwise reports they are at functional baseline and denies concerns with mobility. Will complete consult and defer evaluation, please reconsult as appropriate if patient has decline in functional mobility requiring further skilled inpatient PT needs. Defer Discharge recommendations to medical team.

## 2024-07-25 VITALS
HEART RATE: 86 BPM | BODY MASS INDEX: 19.44 KG/M2 | OXYGEN SATURATION: 100 % | WEIGHT: 127.87 LBS | TEMPERATURE: 98.2 F | SYSTOLIC BLOOD PRESSURE: 101 MMHG | DIASTOLIC BLOOD PRESSURE: 69 MMHG | RESPIRATION RATE: 18 BRPM

## 2024-07-25 LAB
ALBUMIN SERPL BCG-MCNC: 3.5 G/DL (ref 3.5–5.2)
ALP SERPL-CCNC: 86 U/L (ref 40–150)
ALT SERPL W P-5'-P-CCNC: 20 U/L (ref 0–50)
ANION GAP SERPL CALCULATED.3IONS-SCNC: 6 MMOL/L (ref 7–15)
AST SERPL W P-5'-P-CCNC: 21 U/L (ref 0–45)
BILIRUB DIRECT SERPL-MCNC: <0.2 MG/DL (ref 0–0.3)
BILIRUB SERPL-MCNC: 0.3 MG/DL
BUN SERPL-MCNC: 19.5 MG/DL (ref 6–20)
CALCIUM SERPL-MCNC: 8.4 MG/DL (ref 8.8–10.4)
CHLORIDE SERPL-SCNC: 105 MMOL/L (ref 98–107)
CREAT SERPL-MCNC: 0.54 MG/DL (ref 0.51–0.95)
EGFRCR SERPLBLD CKD-EPI 2021: >90 ML/MIN/1.73M2
GLUCOSE BLDC GLUCOMTR-MCNC: 116 MG/DL (ref 70–99)
GLUCOSE BLDC GLUCOMTR-MCNC: 120 MG/DL (ref 70–99)
GLUCOSE SERPL-MCNC: 118 MG/DL (ref 70–99)
HCO3 SERPL-SCNC: 29 MMOL/L (ref 22–29)
INR PPP: 1.01 (ref 0.85–1.15)
MAGNESIUM SERPL-MCNC: 2 MG/DL (ref 1.7–2.3)
PHOSPHATE SERPL-MCNC: 3.9 MG/DL (ref 2.5–4.5)
POTASSIUM SERPL-SCNC: 4.7 MMOL/L (ref 3.4–5.3)
PREALB SERPL-MCNC: 18 MG/DL (ref 20–40)
PROT SERPL-MCNC: 5.9 G/DL (ref 6.4–8.3)
SODIUM SERPL-SCNC: 140 MMOL/L (ref 135–145)

## 2024-07-25 PROCEDURE — 250N000013 HC RX MED GY IP 250 OP 250 PS 637: Performed by: INTERNAL MEDICINE

## 2024-07-25 PROCEDURE — 84134 ASSAY OF PREALBUMIN: CPT | Performed by: STUDENT IN AN ORGANIZED HEALTH CARE EDUCATION/TRAINING PROGRAM

## 2024-07-25 PROCEDURE — 250N000012 HC RX MED GY IP 250 OP 636 PS 637: Performed by: INTERNAL MEDICINE

## 2024-07-25 PROCEDURE — 250N000011 HC RX IP 250 OP 636: Performed by: INTERNAL MEDICINE

## 2024-07-25 PROCEDURE — 250N000013 HC RX MED GY IP 250 OP 250 PS 637

## 2024-07-25 PROCEDURE — 99207 PR APP CREDIT; MD BILLING SHARED VISIT: CPT

## 2024-07-25 PROCEDURE — 36592 COLLECT BLOOD FROM PICC: CPT | Performed by: STUDENT IN AN ORGANIZED HEALTH CARE EDUCATION/TRAINING PROGRAM

## 2024-07-25 PROCEDURE — 84100 ASSAY OF PHOSPHORUS: CPT | Performed by: STUDENT IN AN ORGANIZED HEALTH CARE EDUCATION/TRAINING PROGRAM

## 2024-07-25 PROCEDURE — 83735 ASSAY OF MAGNESIUM: CPT | Performed by: STUDENT IN AN ORGANIZED HEALTH CARE EDUCATION/TRAINING PROGRAM

## 2024-07-25 PROCEDURE — 258N000003 HC RX IP 258 OP 636: Performed by: INTERNAL MEDICINE

## 2024-07-25 PROCEDURE — 99233 SBSQ HOSP IP/OBS HIGH 50: CPT | Performed by: PHYSICIAN ASSISTANT

## 2024-07-25 PROCEDURE — 82248 BILIRUBIN DIRECT: CPT | Performed by: STUDENT IN AN ORGANIZED HEALTH CARE EDUCATION/TRAINING PROGRAM

## 2024-07-25 PROCEDURE — 82040 ASSAY OF SERUM ALBUMIN: CPT | Performed by: STUDENT IN AN ORGANIZED HEALTH CARE EDUCATION/TRAINING PROGRAM

## 2024-07-25 PROCEDURE — 85610 PROTHROMBIN TIME: CPT | Performed by: STUDENT IN AN ORGANIZED HEALTH CARE EDUCATION/TRAINING PROGRAM

## 2024-07-25 PROCEDURE — 82962 GLUCOSE BLOOD TEST: CPT

## 2024-07-25 RX ORDER — SODIUM BICARBONATE 325 MG/1
325 TABLET ORAL 4 TIMES DAILY
Status: DISCONTINUED | OUTPATIENT
Start: 2024-07-25 | End: 2024-07-25

## 2024-07-25 RX ADMIN — BUPRENORPHINE 2 MG: 2 TABLET SUBLINGUAL at 08:37

## 2024-07-25 RX ADMIN — HYDROMORPHONE HYDROCHLORIDE 0.5 MG: 1 INJECTION, SOLUTION INTRAMUSCULAR; INTRAVENOUS; SUBCUTANEOUS at 04:07

## 2024-07-25 RX ADMIN — PROCHLORPERAZINE MALEATE 10 MG: 10 TABLET ORAL at 12:34

## 2024-07-25 RX ADMIN — Medication 60 ML: at 08:55

## 2024-07-25 RX ADMIN — PROMETHAZINE HYDROCHLORIDE 25 MG: 25 INJECTION INTRAMUSCULAR; INTRAVENOUS at 08:50

## 2024-07-25 RX ADMIN — BACLOFEN 20 MG: 10 TABLET ORAL at 06:02

## 2024-07-25 RX ADMIN — CETIRIZINE HYDROCHLORIDE 10 MG: 10 TABLET, FILM COATED ORAL at 08:38

## 2024-07-25 RX ADMIN — PANTOPRAZOLE SODIUM 40 MG: 40 INJECTION, POWDER, FOR SOLUTION INTRAVENOUS at 08:37

## 2024-07-25 RX ADMIN — DOCUSATE SODIUM 50 MG AND SENNOSIDES 8.6 MG 1 TABLET: 8.6; 5 TABLET, FILM COATED ORAL at 04:37

## 2024-07-25 ASSESSMENT — ACTIVITIES OF DAILY LIVING (ADL)
ADLS_ACUITY_SCORE: 42

## 2024-07-25 NOTE — CONSULTS
Care Management Discharge Note    Discharge Date: 07/25/2024       Discharge Disposition:  home    Discharge Services:  TPN, TF [facility]    Discharge DME:  n/a    Discharge Transportation:  per family/friend    Private pay costs discussed: Not applicable    Does the patient's insurance plan have a 3 day qualifying hospital stay waiver?  No    PAS Confirmation Code:  n/a  Patient/family educated on Medicare website which has current facility and service quality ratings:  no    Education Provided on the Discharge Plan:  yes  Persons Notified of Discharge Plans: patient  Patient/Family in Agreement with the Plan:    yes  Handoff Referral Completed: Yes    Additional Information:  RNCC consulted by [facility] liaison for home TPN, TF resumptions. RNCC obtained TPN formula (unchanged) and faxed to [facility] from covering pharmacist. RNCC placed resumption orders for TF and TPN HI. RNCC to cease following after discharge.    [facility]  [address] [place] [address] [place] [address]  Ph: [phone]  Fax: [phone]  Resumption of HC services      [facility]  Phone # [phone]  Fax # [phone]     MEDARDO HodgesN, BA, RN, CMSRN, RNCC  [facility]  Covering Units [address]C Beds 6980-4839/OBS  Phone: [phone]  Available on Vocera search 6C 6502-14 RNCC or OBS RNCC  After Hours [phone]     [address] Beds 5880-8834 SW Ph: [phone]     6B/CRNCC Weekend/holiday on Vocera or [phone]     [facility] RNCC ED/5 Ortho/5 Med/Surg [phone]     [facility] RNCC 6 Med/Surg 8A, [phone]     Observation SW and weekend/after hours phone: [phone]

## 2024-07-25 NOTE — DISCHARGE SUMMARY
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            Unresulted Labs Ordered in the Past 30 Days of this Admission       No orders found from 6/23/2024 to 7/24/2024.            Discharge Disposition   Discharged to home  Condition at discharge: Stable    Hospital Course   Dinora Mcghee is a 59 year old female admitted on 7/23/2024. 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 An EGD was performed by GI, which revealed that her G-tube balloon had traveled across the pylorus. The pylorus was pulling the tube in further, causing increased pain. A Gastografin enema was completed with a resultant BM. Patient's pain improved significantly after  repositioning  the Gtube.    Consultations This Hospital Stay   GI LUMINAL ADULT IP CONSULT  GI LUMINAL ADULT IP CONSULT  PHYSICAL THERAPY ADULT IP CONSULT  OCCUPATIONAL THERAPY ADULT IP CONSULT  NUTRITION SERVICES ADULT IP CONSULT  PHARMACY IP CONSULT  PHARMACY/NUTRITION TO START AND MANAGE TPN  PHARMACY IP CONSULT    Code Status   No CPR- Do NOT Intubate    Time Spent on this Encounter   Argentina STANLEY PA-C, personally saw the patient today and spent less than or equal to 30 minutes discharging this patient.       Argentina Dc PA-C  [facility] EMERGENCY DEPARTMENT  [address]  [facility] [address]  Phone: [phone]  ______________________________________________________________________    Physical Exam   Vital Signs: Temp: 98  F (36.7  C) Temp src: Oral BP: 96/56 Pulse: 75   Resp: (!) 9 SpO2: 97 % O2 Device: None (Room air) Oxygen Delivery: 2 LPM  Weight: 127 lbs 13.87 oz    Constitutional: Awake, alert, cooperative, no apparent distress, and appears stated age  ENT: normocephalic, without obvious abnormality  Respiratory: No increased work of breathing, good air exchange, clear to auscultation bilaterally, no crackles or wheezing  Cardiovascular: regular rate and rhythm, normal S1 and S2, no S3, no S4, and no murmur noted  GI: prior surgical scars notes, well-healed. G-tube and J-tube sites noted without swelling, erythema or drainage. hypoactive bowel sounds, soft, non-distended, and exquisite tenderness overlying RUQ. No other areas of abd tenderness.  Skin: no bruising or bleeding, no redness, warmth, or swelling, no rashes, and no lesions  Musculoskeletal: no lower extremity pitting edema present  Neurologic: Moving all extremities equally and spontaneously. No obvious focal neuro deficits.  Neuropsychiatric: General: normal, calm, and normal eye contact  Level of consciousness: alert / normal  Affect: normal and pleasant  Memory and insight: normal, memory for past and recent events  intact, and thought process normal       Primary Care Physician   Juan Jose Gomez    Discharge Orders      Reason for your hospital stay    You were hospitalized with increased abdominal pain. You had an endoscopy that revealed that your Gtube balloon had traveled across your pylorus. Your pyrlorus was pulling the tube in further causing increased pain. A gastrograffin enema was completed with a resultant BM. Your pain improved significantly after repositioning the Gtube.     Activity    Your activity upon discharge: activity as tolerated     Follow Up and recommended labs and tests    Follow up with primary care provider, Juan Jose Gomez, within 7 days for hospital follow- up.     Diet    Follow this diet upon discharge: Orders Placed This Encounter      Adult Formula Drip Feeding: Continuous [employer] Peptide 1.5; Jejunostomy; Goal Rate: 50ml/hr - if able try to adjust rate throughout day for goal of 1440 ml daily; mL/hr; see relizorb cartridge order      NPO per Anesthesia Guidelines for Procedure/Surgery Except for: Meds       Significant Results and Procedures   Results for orders placed or performed during the hospital encounter of 07/23/24   CT Abdomen Pelvis w Contrast    Narrative    EXAMINATION: CT ABDOMEN PELVIS W CONTRAST, 7/23/2024 7:56 PM    INDICATION: moderate to severe pain at g-tube site/ RUQ abdomen    COMPARISON STUDY: Fluoroscopic images 4/16/2024, CT abdomen and pelvis  11/8/2023    TECHNIQUE: CT scan of the abdomen and pelvis was performed on  multidetector CT scanner using volumetric acquisition technique and  images were reconstructed in multiple planes with variable thickness  and reviewed on dedicated workstations.     CONTRAST: iopamidol (ISOVUE-370) solution 77mL injected IV without  oral contrast    CT scan radiation dose is optimized to minimum requisite dose using  automated dose modulation techniques.    FINDINGS:    Lower thorax: Heart size is normal without pericardial effusion.  Trace  basilar atelectasis. Calcified left lower lobe granuloma.    Liver: No mass. No intrahepatic biliary ductal dilation.    Biliary System: Cholecystectomy. No extrahepatic biliary ductal  dilation.    Pancreas: Surgically absent.    Adrenal glands: No mass or nodules    Spleen: Surgically absent.    Kidneys: No suspicious mass, obstructing calculus or hydronephrosis.    Gastrointestinal tract : Percutaneous gastrostomy tube is located  within the gastric lumen with no focal abnormality the gastrostomy  tract. Percutaneous jejunostomy in the left midabdomen with tip in the  jejunum. No abnormality of the jejunostomy tract. A few prominent  loops of small bowel at the small bowel anastomosis, similar compared  to prior. Small bowel is otherwise within normal limits. Large stool  burden within the colon with gaseous distention of the rectum.  Appendectomy.    Mesentery/peritoneum/retroperitoneum: No mass. No free fluid or air.    Lymph nodes: No significant lymphadenopathy.    Vasculature: Patent major abdominal vasculature.    Pelvis: Urinary bladder is distended.  Prior hysterectomy.    Osseous structures: No aggressive or acute osseous lesion.  L2  vertebral body hemangioma, unchanged.      Soft tissues: Within normal limits.      Impression    IMPRESSION:   1. Normal appearance of the percutaneous gastrostomy and percutaneous  jejunostomy with appropriate positioning.  2. Large colonic stool burden, which may indicate constipation.    I have personally reviewed the examination and initial interpretation  and I agree with the findings.    CARINE BOYD DO         SYSTEM ID:  [id number]   XR Colon Water Soluble    Narrative    EXAMINATION:  XR COLON WATER SOLUBLE THERAPEUTIC 7/24/2024 10:56 PM.    COMPARISON: 3/25/2024    HISTORY:  Therapeutic GGE for chronic constipation (not responsive to  home enema regimen)    FINDINGS:   Portable AP view of the abdomen. Left lower quadrant drain in place.  Contrast present  throughout the distended colon to the splenic  flexure. Surgical clips present in the visualized upper abdomen.      Impression    IMPRESSION:   Contrast present throughout the distended colon to the splenic  flexure.    I have personally reviewed the examination and initial interpretation  and I agree with the findings.    SYDNEY GARCIA MD         SYSTEM ID:  [id number]     *Note: Due to a large number of results and/or encounters for the requested time period, some results have not been displayed. A complete set of results can be found in Results Review.       Discharge Medications   Current Discharge Medication List        START taking these medications    Details   parenteral nutrition - PTA/DISCHARGE ORDER The TPN formula will print on the After Visit Summary Report.  Qty: 1 each, Refills: 0    Associated Diagnoses: Malnutrition, unspecified type (H24)           CONTINUE these medications which have NOT CHANGED    Details   acetaminophen (TYLENOL) 325 MG/10.15ML solution 20.3 mLs (650 mg) by Per J Tube route every 6 hours as needed for mild pain or fever  Qty: 473 mL, Refills: 4    Associated Diagnoses: Gastroparesis      baclofen (LIORESAL) 10 MG tablet 1-2 tablets (10-20 mg) by Per G Tube route 3 times daily  Qty: 60 tablet, Refills: 1    Associated Diagnoses: Abdominal pain, generalized      buprenorphine (SUBUTEX) 2 MG SUBL sublingual tablet Place 2 mg under the tongue 2 times daily      cetirizine (ZYRTEC) 10 MG tablet 1 tablet (10 mg) by Per G Tube route 2 times daily  Qty: 60 tablet, Refills: 11    Comments: The patient requests that this prescription be held on file for filling in the near future.  Associated Diagnoses: Seasonal allergic conjunctivitis      !! COMPOUNDED NON-CONTROLLED SUBSTANCE (CMPD RX) - PHARMACY TO MIX COMPOUNDED MEDICATION Administer 60 mg amitriptyline 2 mg/ml via G-J tube at bedtime  Qty: 900 mL, Refills: 3    Comments: Patient has had difficulty with crushing amitriptyline  tablets and administering via tube. This has led to erratic absorption and noticeable side effects.  Associated Diagnoses: Abdominal pain, generalized      !! COMPOUNDED NON-CONTROLLED SUBSTANCE (CMPD RX) - PHARMACY TO MIX COMPOUNDED MEDICATION Administer 40 mg amitriptyline suspension (2 mg/mL) via G-J tube at bedtime.  Qty: 600 mL, Refills: 5    Comments: Patient has had difficulty with crushing amitriptyline tablets and administering via tube. This has led to erratic absorption and noticeable side effects.  Associated Diagnoses: Abdominal pain, generalized      !! COMPOUNDED NON-CONTROLLED SUBSTANCE (CMPD RX) - PHARMACY TO MIX COMPOUNDED MEDICATION Place 1 enema rectally 2 times daily as needed (for constipation)  Qty: 60 each, Refills: 3    Associated Diagnoses: Chronic idiopathic constipation      Continuous Blood Gluc Sensor (FREESTYLE LISA 3 SENSOR) MISC CHANGE EVERY 14 DAYS  Qty: 2 each, Refills: 11    Associated Diagnoses: Type 1 diabetes mellitus with hypoglycemia and without coma (H)      Digestive Enzyme Cartridge (RELIZORB) MARCO 2 Cartridges daily  Qty: 60 each, Refills: 6    Associated Diagnoses: Pancreatic insufficiency      diphenhydrAMINE (BENADRYL) 12.5 MG/5ML solution Take 25 mg by mouth 4 times daily as needed for other (nausea)      droNABinol (SYNDROS) 5 MG/ML oral soln Take 0.5 mLs (2.5 mg) by mouth 2 times daily  Qty: 60 mL, Refills: 0    Associated Diagnoses: Adult failure to thrive; Malnutrition, unspecified type (H24); History of food intolerance      DULoxetine HCl 60 MG CSDR 1 capsule (60 mg) by Per J Tube route daily Sprinkle caps for feeding tube  Qty: 90 capsule, Refills: 3    Comments: The patient requests that this prescription be held on file for filling in the near future.  Associated Diagnoses: Situational depression      estradiol (VAGIFEM) 10 MCG TABS vaginal tablet INSERT 1 TABLET(10 MCG) VAGINALLY 2 TIMES A WEEK  Qty: 24 tablet, Refills: 2    Comments: The patient requests  "that this prescription be held on file for filling in the near future.  Associated Diagnoses: Atrophic vaginitis      famotidine (PEPCID) 40 MG/5ML suspension 2.5 mLs (20 mg) by Per J Tube route daily  Qty: 50 mL, Refills: 4    Associated Diagnoses: Gastroparesis      fluconazole (DIFLUCAN) 40 MG/ML suspension Take 5 mLs by mouth every 24 hours      glucagon 1 MG kit Give 0.1 to 0.15mg( 10-15 units on Insulin sryinge) subcutaneous  every 15 minutes PRN for hypoglycemia. Remix new kit q24hr. Needs up to 3 kit/week.  Qty: 10 each, Refills: 3    Associated Diagnoses: Hypoglycemia      glucose 40 % GEL gel Take 15-30 g by mouth every 15 minutes as needed for low blood sugar  Qty: 3 Tube, Refills: 2    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      ibuprofen (ADVIL/MOTRIN) 400 MG tablet take 30 mls  by oral route  every 4 - 6 hours as needed      !! insulin aspart (NOVOLOG FLEXPEN) 100 UNIT/ML pen Take 1 unit if BG is 175-275, and 2 units if >275 up to every 4 hours as needed.  \"Prime\" pen needle with 2 unit airshot before giving, needs supply for 15 unit/day.  Qty: 15 mL, Refills: 11    Comments: Substitute with Fiasp, Novolin R, etc if not covered.  Associated Diagnoses: Type 1 diabetes mellitus without complication (H); Post-pancreatectomy diabetes (H); Hypoglycemia after GI (gastrointestinal) surgery; Diabetes insipidus (H24)      insulin detemir (LEVEMIR PEN) 100 UNIT/ML pen Take 7 to 10 units per day, adjust as directed by MD.  Do 2 unit 'prime' before dosing, needs 12 units/day supply.  Qty: 15 mL, Refills: 5    Associated Diagnoses: Post-pancreatectomy diabetes (H)      insulin syringe-needle U-100 (30G X 1/2\" 0.3 ML) 30G X 1/2\" 0.3 ML miscellaneous Use 3 syringes daily or as directed.  Qty: 100 each, Refills: 11    Associated Diagnoses: Hypoglycemia      lactated ringers infusion Inject 1,000 mLs into the vein daily as needed      levothyroxine (SYNTHROID) 25 mcg/mL SUSP 5.48 mLs (137 mcg) by Per " J Tube route daily  Qty: 500 mL, Refills: 3    Associated Diagnoses: Hypothyroidism, unspecified type      lidocaine (LIDODERM) 5 % patch Place onto the skin every 24 hours To prevent lidocaine toxicity, patient should be patch free for 12 hrs daily.      lipase-protease-amylase (CREON) 76407-76172-823898 units CPEP per EC capsule Take 3-4 capsules by mouth 3 times daily (with meals) With oral meals  Qty: 150 capsule, Refills: 11    Associated Diagnoses: Pancreatic insufficiency      methocarbamol (ROBAXIN) 750 MG tablet Take 1 tablet (750 mg) by mouth 4 times daily as needed for muscle spasms  Qty: 120 tablet, Refills: 11    Associated Diagnoses: Chronic pain syndrome      montelukast (SINGULAIR) 10 MG tablet Take by mouth every 24 hours      naloxegol (MOVANTIK) 12.5 MG TABS tablet Take 25 mg by mouth every morning (before breakfast)      Nutritional Supplements (BOOST HIGH PROTEIN) LIQD After above baseline labs are drawn, give: 6 mL/kg to maximum of 360 mL; the beverage is to be consumed within 5 minutes.  Refills: 0    Comments: If on pancreatic enzymes, patient may take home enzymes with Boost beverage.      Patients may take long acting insulin (Levemir and Lantus).      Patient should NOT cover Boost with Novolog, Humalog, Apidra, or regular insulin.  Associated Diagnoses: Pancreatic insufficiency; Post-pancreatectomy diabetes (H); Malabsorption      pantoprazole (PROTONIX) 40 mg IV push injection Inject 40 mg into the vein daily (with breakfast)  Qty: 30 each, Refills: 11    Comments: Patient gets it from her home infusion  Associated Diagnoses: Gastroparesis      prochlorperazine (COMPAZINE) 10 MG tablet Take 1 tablet (10 mg) by mouth every 6 hours as needed for nausea or vomiting  Qty: 120 tablet, Refills: 3    Associated Diagnoses: Nausea and vomiting, unspecified vomiting type      prochlorperazine (COMPRO) 25 MG suppository Place 1 suppository (25 mg) rectally every 12 hours as needed for nausea  Qty:  "10 suppository, Refills: 2    Associated Diagnoses: Abdominal pain, generalized; Gastroparesis      promethazine 25 mg Inject 25 mg into the vein 3 times daily      promethazine-phenylephrine (PROMETHAZINE VC) 6.25-5 MG/5ML syrup Take 5 mLs by mouth every 6 hours      rimegepant (NURTEC) 75 MG ODT tablet Place 1 tablet (75 mg) under the tongue daily as needed for migraine Maximum of 1 tablet every 24 hours.  Qty: 8 tablet, Refills: 11    Associated Diagnoses: Migraine with aura and without status migrainosus, not intractable      scopolamine (TRANSDERM) 1 MG/3DAYS 72 hr patch Place 1 patch onto the skin every 72 hours - Transdermal  Qty: 10 patch, Refills: 11    Associated Diagnoses: Slow transit constipation      sennosides (SENOKOT) 8.8 MG/5ML syrup 5 mLs by Per J Tube route 2 times daily  Qty: 236 mL, Refills: 11    Associated Diagnoses: Gastroparesis      Sharps Container (BD SHARPS ) MISC 1 Container as needed  Qty: 1 each, Refills: 1    Associated Diagnoses: Post-pancreatectomy diabetes (H)      silver nitrate (ARZOL SILVER NIT APPLICATORS) 75-25 % miscellaneous Apply topically as needed for wound care Apply Silver Nitrate Sticks every 2-3 days until granulation tissue resolved.  \"Roll\" tip of Silver Nitrate Q tip directly onto the granulation tissue-bulging tissue area.  Have Agency or Home Care Nurse administer this, if you prefer.  Take care not to touch any healthy tissue or skin.  The tissue will turn white and eventually black & scab off.  May repeat if needed in the future for recurrence.  Qty: 30 applicator, Refills: 0    Associated Diagnoses: Encounter for care related to feeding tube      sodium phosphate, mineral oil, magnesium citrate enema Instill 1 pink 187 ml enema twice daily as needed for constipation    Associated Diagnoses: Constipation; Gastroparesis; Chronic idiopathic constipation      STATIN NOT PRESCRIBED (INTENTIONAL) Previous liver issues, risks vs benefits felt to not " "warrant statin.    Discussed Oct 2022 visit      zinc oxide - white petrolatum (CRITIC-AID THICK MOIST BARRIER) 20-51% PSTE topical paste Apply 71 g topically every hour as needed for rash (Under J tube bumper when needed for skin protection)  Qty: 170 g, Refills: 0    Associated Diagnoses: Epigastric pain      ZOLMitriptan (ZOMIG-ZMT) 5 MG ODT Take 1 tablet (5 mg) by mouth at onset of headache for migraine Dissolve tablet in the mouth. May repeat in 2 hours. Max 2 tablets/24 hours.  Qty: 12 tablet, Refills: 6    Associated Diagnoses: Migraine with aura and without status migrainosus, not intractable      !! insulin aspart (NOVOLOG PEN) 100 UNIT/ML pen Take 1 unit if BG is 175-275, and 2 units if >275 up to every 4 hours as needed.  \"Prime\" pen needle with 2 unit airshot before giving, needs supply for 15 unit/day.  Qty: 15 mL, Refills: 11    Associated Diagnoses: Type 1 diabetes mellitus without complication (H)       !! - Potential duplicate medications found. Please discuss with provider.        Allergies   Allergies   Allergen Reactions    Apple Juice Anaphylaxis    Corticosteroids Other (See Comments)     All oral, IV and injectable steroids are contraindicated (unless in life threatening situations) in Islet Auto transplant recipients. They can cause irreversible loss of islet cell function. Please contact patient's transplant care coordinator ADI Gaffney RN at [phone]/pager [phone] and/or endocrinologist prior to administration.      Depakote [Divalproex Sodium] Other (See Comments)     Chest pain    Zithromax [Azithromycin Dihydrate] Anaphylaxis     Noted in 4/7/08 ER    Bromfenac      Other reaction(s): Headache    Codeine      Other reaction(s): Hallucinations    Darvocet [Propoxyphene N-Apap] Itching    Morphine Nausea and Vomiting and Rash    Nalbuphine Hcl Rash     RASH :nubaine    Zosyn [Piperacillin-Tazobactam In Dex] Rash     Possible allergy, did have a diffuse rash that seemed drug " related but could have also been related to soap in the hospital.     Bactrim [Sulfamethoxazole W-Trimethoprim] Other (See Comments) and Nausea and Vomiting     Severely low liver function.    Statins Other (See Comments)     Previous liver issues, risks vs benefits felt to not warrant statin.  Discussed Oct 2022 visit    Tape [Adhesive Tape] Blisters    Tramadol     Zofran [Ondansetron] Other (See Comments)     migraine    Flagyl [Metronidazole] Hives and Rash

## 2024-07-25 NOTE — PROGRESS NOTES
GASTROENTEROLOGY PROGRESS NOTE    Date of Admission: 7/23/2024  Reason for Admission: abdominal pain      ASSESSMENT:  Dinora Mcghee is a 59 year old female well known to both the luminal and panc/bili GI services with very complex past medical and surgery history, presented to [facility] ED with pain at PEG site as well as acute on chronic constipation.    # Acute on chronic abdominal pain  # Pain at PEG site  # Severe protein malnutrition - S/p PEG and PEJ, TF and TPN dependent  S/p EGD for evaluation of pain at PEG site.  The stomach was notable for the mature gastrostomy tract with the balloon across the pylorus to the duodenum. The balloon was deflated and the tube withdrawn a couple centimeters to allow the balloon to be re-expanded with 7cc of water and seat against the healthy appearing stomach wall. Since EGD and tube repositioning, pain is now at baseline. Instructed patient to monitor markings at PEG site (see Antoinette Araujo PA-C note from 7/24 PM).    # Chronic constipation  Underwent GGE with large stool output however XR post enema still showing large amount of stool present. Patient feels at baseline and prefers to discharge to home to administer additional enema.    RECOMMENDATIONS:  - Continue tube feeds, TPN and diet as tolerated  - OK to discharge patient to home today  - Post GGE, resume PTA bowel regimen:                -Amtriptylline 60 m q HS.  -Pink lady enemas PRN (per pt administers every other day but may increase to daily for the next couple of days)               -Senna 8.8 mg BID.               -Miralax 17 gm BID via Jtube.               -Pelvic floor exercises per PT.    Discussed with primary team Argentina Dc PA-C with hospitalist team    The patient was discussed and plan agreed upon with GI staff, Dr Montoya.      Overall time spent on the date of this encounter preparing to see the patient (including chart review of available notes, clinical status events, imaging and labs);  "obtaining history; examining the patient, coordinating and/or ordering medications, tests and/or procedures; communicating with other health care professionals; and documenting the above clinical information in the electronic medical record was  50  minutes.      Shantell Dowling PA-C, [facility]  Advanced Endoscopy/Pancreaticobiliary GI Service  [facility]  Vocera   ______________________________________________________      Interval events since last evaluated: Nursing notes and 24hr events reviewed. NAEON, vitals stable.     Patient seen and examined at 1000, remains in an ED room. Patient reports that she feels \"at baseline\". Reports that the pain she presented with has resolved. Tube feeds resumed last night. Has large BM after GGE but was told by radiologist that a lot of stool remains. Would prefer not to do another enema in the hospital and instead resume her daily regimen at home which is much more    ROS:   4 pt ROS negative unless noted in subjective.     Objective:  Blood pressure 96/56, pulse 75, temperature 98  F (36.7  C), temperature source Oral, resp. rate (!) 9, weight 58 kg (127 lb 13.9 oz), SpO2 97%, not currently breastfeeding.  Gen: Sitting in chair at bedside  HEENT: NCAT. Conjunctiva clear. Sclera anicteric   Chest: normal work of breathing, speaking in full sentences  Msk: no gross deformity  Skin:  no jaundice  Ext: warm, well perfused  Neuro: grossly normal  Mental status/Psych: A&O. Asks/answers questions appropriately       PROCEDURES:  EGD 7/24 Dr. Montoya  Impression:            - Nonobstructive Schatzki ring                          - Uncomplicated repositioning of malpositioned G tube                          balloon bumper                          - No evidence of ulceration or other mucosal                          abnormality     LABS:  BMP  Recent Labs   Lab 07/25/24  0600 07/25/24  0358 07/25/24  0001 07/24/24 2057 07/24/24  0824 07/24/24  0600 " 07/24/24  0013 07/23/24  1708     --   --   --   --  137  --  139   POTASSIUM 4.7  --   --   --   --  4.6  --  4.8   CHLORIDE 105  --   --   --   --  101  --  102   KASSI 8.4*  --   --   --   --  8.3*  --  8.8   CO2 29  --   --   --   --  28  --  28   BUN 19.5  --   --   --   --  21.8*  --  21.1*   CR 0.54  --   --   --   --  0.59  --  0.62   * 120* 116* 111*   < > 130*   < > 108*    < > = values in this interval not displayed.     CBC  Recent Labs   Lab 07/24/24  0600 07/23/24  1708   WBC 6.6 7.8   RBC 3.86 4.15   HGB 12.0 12.6   HCT 36.6 39.8   MCV 95 96   MCH 31.1 30.4   MCHC 32.8 31.7   RDW 13.7 13.7    428     INR  Recent Labs   Lab 07/25/24  0600   INR 1.01     LFTs  Recent Labs   Lab 07/25/24  0600 07/24/24  0600 07/23/24  1708   ALKPHOS 86 93 102   AST 21 20 25   ALT 20 20 23   BILITOTAL 0.3 0.2 0.4   PROTTOTAL 5.9* 6.1* 6.8   ALBUMIN 3.5 3.4* 3.9      PANCNo lab results found in last 7 days.      IMAGING:  (Personally reviewed)  ------------------------------------------------------------------  XR Colon Water Soluble 7/24 2256  RESIDENT PRELIMINARY INTERPRETATION  IMPRESSION:   Contrast present throughout the distended colon to the hepatic  flexure.   This result has not been signed. Information might be incomplete.     ------------------------------------------------------------------

## 2024-07-25 NOTE — PROGRESS NOTES
CLINICAL NUTRITION SERVICES - BRIEF NOTE    RD received consult for Continuation of home TPN. Meds/labs reviewed. Per [employer], pt is approved for Relizorb through insurance. Met with pt at bedside to clarify enzyme regimen. Pt typically receives 2 cartridges of relizorb during the day and 1 at night. Pt states this is d/t ml of TF she gives during the day vs at night. Pt reports TF at 50ml/hr continuously has been going well and is agreeable to adjustment in relizorb order to reflect current TF order. Will re-start. Pt reports she taking Creon 24 only when eating will take 2-5 capsules depending on fat content of meal. Pt denied additional nutrition related questions/concerns at this time.    INTERVENTIONS  RECOMMENDATIONS FOR MDs/PROVIDERS TO ORDER:  Optimize bowel regimen/antiemetics     Recommendations already ordered by Registered Dietitian (RD):  EN access: GJ tube   Goal TF: Symphony Dynamo Peptide 1.5 goal of 4.4 cartons total per day or 1440ml + 1 pkt Prosource TF 20. Start with continuous feeds of 50ml/hr(per pt preference), adjust rate as pt allows for goal of 1440 ml. At 50 ml/hr x24hrs provides 1200, 1880 kcals, 166 g CHO, 18 g fiber, 109 g protein, 92 g fat, 840ml free water. Condense as able/tolerated to Symphony Dynamo Peptide 1.5 120 ml/hr x 12hrs.  60 ml Q4H fluid flushes for tube patency. Additional fluids and/or adjustments per MD.      PO intake as tolerated once diet progressed. Once diet progressed, re-start Creon 24 2-5 capsules with meals(pt may adjust based on content of meal).    To re-start supplemental PN,  Dosing weight:  53.6 kg per [employer]  Access: CVC     Initial parameters (per day)  Volume:  1000 mL x 24hrs   Dextrose: 100 g  AA: 60 g  Lipids: none     Additives: infuvite 10 ml, tralement 1 ml. Additional per PharmD.     Goal PN provides 100 g dextrose, 60 g AA  for total provision of 580 Kcals (10 Kcals/kg), 1 g/kg protein, GIR 1.22 mg/kg/minute.     RN: Change 1 RELIZORB cartridge every 24 hours  with TF rate at 10-20 ml/hr.  Change 1 RELIZORB cartridge every 12 hours with TF rates >20 ml/hr.  RN: Obtain cartridges by calling [phone] ([facility]) and request [employer] #[id number]      Implementation  Enteral Nutrition - continue  Parenteral Nutrition/IV Fluids - Initiate    Africa Juan MS, RD, LD, CNSC    6C (beds 3176-8396) + 7C (beds 8520-1246) + ED   Available in Vocera by name or unit dietitian

## 2024-07-25 NOTE — PLAN OF CARE
Neuro: A&Ox4.   Cardiac: SR. VSS.   Respiratory: Sating 98 on RA.  GI/: Adequate urine output. No bm this shift  Diet/appetite: NPO diet. TF + TPN  Activity:  ind  Pain: denies  Skin: No new deficits noted.  LDA's:  G tube - clamped   J Tube - clamped  DL CVC - SL   Plan: Continue with POC. Notify primary team with changes.      DISCHARGE                         ----------------------------------------------------------------------------  Discharged to: Home  Via: private transportation  Accompanied by: Family  Discharge Instructions: *diet, *activity, medications, follow up appointments, when to call the MD, aftercare instructions.  Prescriptions: To be filled by  pharmacy; medication list reviewed & sent with pt  Follow Up Appointments: arranged; information given  Belongings: All sent with pt  Telemetry: d/c'd  Pt exhibits understanding of above discharge instructions; all questions answered.

## 2024-07-26 ENCOUNTER — PATIENT OUTREACH (OUTPATIENT)
Dept: CARE COORDINATION | Facility: CLINIC | Age: 59
End: 2024-07-26
Payer: COMMERCIAL

## 2024-07-26 NOTE — PROGRESS NOTES
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 (Red Flag symptoms escalate to triage hotline per guidelines): Improved  Do you know how to contact your clinic care team if you have future questions or changes to your health status? : Yes  Does the patient have their discharge instructions? : Yes  Does the patient have questions regarding their discharge instructions? : No  Were you started on any new medications or were there changes to any of your previous medications? : Yes  Does the patient have all of their medications?: Yes  Do you have questions regarding any of your medications? : No  Do you have all of your needed medical supplies or equipment (DME)?  (i.e. oxygen tank, CPAP, cane, etc.): Yes         Post-op (Clinicians Only)  Did the patient have surgery or a procedure: Yes  Incision: closed  Drainage: No  Bleeding: none  Fever: No  Chills: No  Redness: No  Warmth: No  Swelling: No  Incision site pain: No  Closure: none  Eating & Drinking: NPO  Bowel Function: normal  Urinary Status: voiding without complaint/concerns      Follow up Plan   Discharge Follow-Up  Discharge follow up appointment scheduled in alignment with " recommended follow up timeframe or Transitions of Risk Category? (Low = within 30 days; Moderate= within 14 days; High= within 7 days): No  Patient's follow up appointment not scheduled: Patient declined scheduling support. Education on the importance of transitions of care follow up. Provided scheduling phone number.    Future Appointments   Date Time Provider Department Center   8[facility]12/2024  2:00 PM [facility]   8/15/2024  2:30 PM Rachelle العلي APRN CNP [facility]   8/26/2024  2:00 PM [facility]   9/25/2024  8:00 AM Vijaya Genao MD [facility]   11/7/2024  2:00 PM Rachelle العلي APRN CNP [facility]     Outpatient Plan as outlined on AVS reviewed with patient.    For any urgent concerns, please contact our 24 hour nurse triage line: [phone])       Suzette Waterman RN

## 2024-08-08 ENCOUNTER — TRANSFERRED RECORDS (OUTPATIENT)
Dept: HEALTH INFORMATION MANAGEMENT | Facility: CLINIC | Age: 59
End: 2024-08-08
Payer: COMMERCIAL

## 2024-08-10 NOTE — PROGRESS NOTES
Colon and Rectal Surgery Follow Up Video Note    RE: Dinora Mcghee  : 1965  HAIM: 2024    Dinora Mcghee is a 59 year old female who is being evaluated via a billable video visit.      Patient has given verbal consent for Video visit? 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     History of Present Illness:  History of acute pancreatitis which was diagnosis in  at [facility]. An endoscopic retrograde cholangiopancreatography at that time was performed with issues with the pancreatic duct eventually with chronic pancreatitis ultimately s/p total pancreatectomy and islet autotransplant. Postoperatively she developed type 1 diabetes mellitus.   Diagnosed with gastroparesis and chronic pancreatitis by Dr. Park in 2015 with multiple hospitalizations, stents.   As well as at [facility] in 2016 had a GJ tube placed. This helped initially, and after her TPIAT she also experienced some improvement. She had a few years that she was able to eat but eventually transitioned back to tube feeds via her J-tube. She has a venting G-tube and is on chronic antiemetics (daily scopolamine patch, IV promethazine, J-tube promethazine, and compazine).   Chronic constipation since having children over 20 years ago. She has been on multiple medications including Miralax, senna, milk of magnesia, Linzess, Amitiza (lost effectiveness), Trulance (caused diarrhea and fecal incontinence), Motegrity.   Her current regimen is Motegrity, Senna BID, Miralax prn. With this she still has constipation and in 2024 she presented to a local ED, where she received two enemas with  improvement. She also has had abdominal x-rays with gastrografin enema and passes a significant amount of stool following these. She follows with Dr. Genao who recommended daily pink lady enemas. However, this caused nocturnal fecal leakage and so the enemas were decreased to every other day.   Pelvic floor testing completed on 24 suggestive of pelvic floor dysfunction and obstructive defecation. On defecography, she was unable to evacuate any rectal contrast due to non-relaxation.     She recently did pelvic floor physical therapy with biofeedback. Her last visit was in 2024 and there was some improvement in relaxation in her pelvic floor muscles.  She has a complex past surgical history includin, , : C-sections  : laparoscopic cholecystectomy  : total abdominal hysterectomy with bilateral salpingoopherectomy  Appendectomy  2016: laparoscopic total pancreatectomy, islet cell autotransplant, splenectomy, gastrojejunostomy tube duodenojejunostomy, Vera-Y reconstruction, liver biopsy, lysis of adhesions >60 minutes  2018: laparoscopic incisional hernia (2 cm close to umbilicus) repair with 12 cm round Parietex mesh  2022: exploratory laparotomy, extensive lysis of adhesions, small bowel resection, placement of gastric jejunostomy for enteral feeding  2023: exploratory laparotomy, lysis of adhesions, perforated jejunostomy tube resection, replacement of jejunostomy tube  Colonoscopy 2023 with two 2 mm tubular adenomas, melanosis coli in cecum and ascending colon, normal random biopsies, sigmoid diverticulosis, internal and external hemorrhoids.    Interval History: I met Dinora on 5/15/24 and we discussed surgical options for her constipation and pelvic floor dysfunction. We discussed a total abdominal colectomy with ileorectal anastomosis versus end ileostomy. She wanted to think about these options and returns today to discuss surgery. She is having more issues now  "with the G-tube and the tube is \"pulling\" inwards and abrading skin. She is also concerned about having surgery and recovery with her son getting  in about one month and wanting both to feel well as well as be recovered in time which is going to potentially be challenging logistically.       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   We previously discussed surgical intervention with a total abdominal colectomy and ileorectal anastomosis and dlI versus end ileostomy. The former would be a 2 stage procedure and also have less risk since the ileorectal anastomosis will be diverted while healing She is interested in proceeding with a total abdominal colectomy with ileorectal anastomosis. This will also involve a temporary diverting loop ileostomy.   She will need full bowel prep with antibiotics, enemas, and stoma marking. She will also work with wound care ostomy nursing on education. She does have adhesive that works (as well as some that cause skin reactions)  Patient's questions were answered to her stated satisfaction and she is in agreement with this plan. We will do our best with the schedule to accommodate.    Video-Visit Details    Type of service:  Video Visit    Video End Time (time video stopped): 4:50 PM    Originating Location (pt. Location): Home    Distant Location (provider location):  [facility] [place]     Mode of Communication:  Video Conference via [employer]    30 minutes spent on the date of the encounter doing chart review, history, review of imaging, documentation ,and further activities as noted above, which includes my time spent on video with the " patient/family.       Medical history:  Past Medical History:   Diagnosis Date    Allergic rhinitis, cause unspecified     Allergy to other foods     strawberries, apples, celeries, alice, watermelon    Arthritis     left knee    Choledocholithiasis     long after cholecystectomy    Chronic abdominal pain     Chronic constipation     Chronic nausea     Chronic pancreatitis (H)     Degeneration of lumbar or lumbosacral intervertebral disc     Esophageal reflux     w/ hiatal hernia    Gastroparesis     Hiatal hernia     History of pituitary adenoma     s/p resection    Hypothyroidism     Migraines     Mild hyperlipidemia     On tube feeding diet     presence of GJ tube    Pancreatic disease     PD stricture, 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 endometriosis growth    APPENDECTOMY       SECTION  1993    COLONOSCOPY  2014    COLONOSCOPY N/A 2023    Procedure: COLONOSCOPY, WITH POLYPECTOMY AND BIOPSY;  Surgeon: Percy Chamorro MD;  Location:  GI    ENDOSCOPIC INSERTION TUBE GASTROSTOMY N/A 2021    Procedure: Gastrojejonostomy placement with jejunopexy, PEG tube exchange;  Surgeon: Zackery Montoya MD;  [facility]    ENDOSCOPIC RETROGRADE CHOLANGIOPANCREATOGRAM N/A 2015    Procedure: ENDOSCOPIC RETROGRADE CHOLANGIOPANCREATOGRAM;  Surgeon: Mandeep Park MD;  [facility]    ENDOSCOPIC RETROGRADE CHOLANGIOPANCREATOGRAM N/A 2016    Procedure: COMBINED ENDOSCOPIC RETROGRADE CHOLANGIOPANCREATOGRAPHY, PLACE TUBE/STENT;  Surgeon: Mandeep Park MD;  [facility]    ENDOSCOPIC RETROGRADE CHOLANGIOPANCREATOGRAM N/A  03/17/2016    Procedure: COMBINED ENDOSCOPIC RETROGRADE CHOLANGIOPANCREATOGRAPHY, REMOVE FOREIGN BODY OR STENT/TUBE;  Surgeon: Mandeep Park MD;  [facility]    ENDOSCOPIC RETROGRADE CHOLANGIOPANCREATOGRAM N/A 08/02/2016    Procedure: COMBINED ENDOSCOPIC RETROGRADE CHOLANGIOPANCREATOGRAPHY, PLACE TUBE/STENT;  Surgeon: Mandeep Park MD;  [facility]    ENDOSCOPIC RETROGRADE CHOLANGIOPANCREATOGRAM N/A 08/26/2016    Procedure: COMBINED ENDOSCOPIC RETROGRADE CHOLANGIOPANCREATOGRAPHY, REMOVE FOREIGN BODY OR STENT/TUBE;  Surgeon: Mandeep Park MD;  [facility]    ENDOSCOPIC ULTRASOUND UPPER GASTROINTESTINAL TRACT (GI) N/A 10/03/2016    Procedure: ENDOSCOPIC ULTRASOUND, ESOPHAGOSCOPY / UPPER GASTROINTESTINAL TRACT (GI);  Surgeon: Guru Jose Rodas MD;  Location:  OR    ENT SURGERY  1989    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    ENTEROSCOPY SMALL BOWEL N/A 02/03/2022    Procedure: ENTEROSCOPY with possible fistula closure;  Surgeon: Francisco Dodson MD;  Location:  GI    ESOPHAGOSCOPY, GASTROSCOPY, DUODENOSCOPY (EGD), COMBINED N/A 06/24/2015    Procedure: COMBINED ESOPHAGOSCOPY, GASTROSCOPY, DUODENOSCOPY (EGD), REMOVE FOREIGN BODY;  Surgeon: Mandeep Park MD;  Location:  GI    ESOPHAGOSCOPY, GASTROSCOPY, DUODENOSCOPY (EGD), COMBINED N/A 10/25/2015    Procedure: COMBINED ESOPHAGOSCOPY, GASTROSCOPY, DUODENOSCOPY (EGD);  Surgeon: Sammy Amaro MD;  Location:  GI    ESOPHAGOSCOPY, GASTROSCOPY, DUODENOSCOPY (EGD), COMBINED N/A 10/25/2015    Procedure: COMBINED ESOPHAGOSCOPY, GASTROSCOPY, DUODENOSCOPY (EGD), BIOPSY SINGLE OR MULTIPLE;  Surgeon: Sammy Amaro MD;  Location:  GI    ESOPHAGOSCOPY, GASTROSCOPY, DUODENOSCOPY (EGD), COMBINED N/A 01/31/2023    Procedure: ESOPHAGOGASTRODUODENOSCOPY (EGD) with peg replacement ;  Surgeon: Mandeep Park MD;  Location:  OR    ESOPHAGOSCOPY, GASTROSCOPY, DUODENOSCOPY (EGD), COMBINED N/A  7/24/2024    Procedure: Esophagoscopy, gastroscopy, duodenoscopy (EGD), combined;  Surgeon: Zackery Montoya MD;  [facility]    ESOPHAGOSCOPY, GASTROSCOPY, DUODENOSCOPY (EGD), DILATATION, COMBINED      EXCISE LESION TRUNK N/A 04/17/2017    Procedure: EXCISE LESION TRUNK;  Removal of Abdominal Foreign Body;  Surgeon: Nestor Phoenix MD;  [facility]    HC ESOPH/GAS REFLUX TEST W NASAL IMPED >1 HR N/A 11/19/2015    Procedure: ESOPHAGEAL IMPEDENCE FUNCTION TEST WITH 24 HOUR PH GREATER THAN 1 HOUR;  Surgeon: Thiago Apple MD;  [facility]    HC UGI ENDOSCOPY DIAG W BIOPSY  09/17/2008    HC UGI ENDOSCOPY DIAG W BIOPSY  09/27/2012    HC UGI ENDOSCOPY W ESOPHAGEAL DILATION BALLOON <30MM  09/17/2008    HC UGI ENDOSCOPY W EUS N/A 05/05/2015    Procedure: COMBINED ENDOSCOPIC ULTRASOUND, ESOPHAGOSCOPY, GASTROSCOPY, DUODENOSCOPY (EGD);  Surgeon: Wm Dueñas MD;  [facility]    HC WRIST ARTHROSCOP,RELEASE XVERS LIG Bilateral 12/17/2008    INJECT TRANSVERSUS ABDOMINIS PLANE (TAP) BLOCK BILATERAL Left 09/22/2016    Procedure: INJECT TRANSVERSUS ABDOMINIS PLANE (TAP) BLOCK BILATERAL;  Surgeon: Dickson Corrigan MD;  [facility]    INJECT TRIGGER POINT Bilateral 09/08/2022    Procedure: Abdominal trigger point injection with ultrasound;  Surgeon: Monika Mahajan MD;  [facility]    INJECT TRIGGER POINT SINGLE / MULTIPLE 3 OR MORE MUSCLES Right 11/15/2022    Procedure: Trigger point injections right abdomen with ultrasound guidance;  Surgeon: Monika Mahajan MD;  [facility]: [facility]    IR CHEST PORT PLACEMENT < 5 YRS OF AGE  06/10/2022    IR CVC TUNNEL PLACEMENT > 5 YRS OF AGE  4/15/2024    IR PORT REMOVAL RIGHT  11/09/2023    laparoscopic pineda  01/01/1995    LAPAROSCOPIC HERNIORRHAPHY INCISIONAL N/A 08/23/2018    Procedure: LAPAROSCOPIC HERNIORRHAPHY INCISIONAL;  Laparoscopic Incisional Hernia Repair with Symbotex Mesh Implant;  Surgeon: Nestor Phoenix MD;  [facility]     LAPAROSCOPIC PANCREATECTOMY, TRANSPLANT AUTO ISLET CELL N/A 12/28/2016    Procedure: LAPAROSCOPIC PANCREATECTOMY, TRANSPLANT AUTO ISLET CELL;  Surgeon: Nestor Phoenix MD;  [facility]    LAPAROTOMY EXPLORATORY N/A 01/31/2023    Procedure: Exploratory Laparotomy, lysis of adhesions, Perforated J-Junostomy Resection, Replace J-Junostomy site;  Surgeon: Elver Bauer MD;  [facility]    LAPAROTOMY, LYSIS ADHESIONS, COMBINED N/A 01/31/2023    Procedure: Dilatation of jejunostomy feeding tube, track with placement of jejunostomy tube with fluoroscopy;  Surgeon: Elver Bauer MD;  [facility]    PICC DOUBLE LUMEN PLACEMENT Left 11/13/2023    Basilic Vein 5F DL 43 cm    REMOVE AND REPLACE BREAST IMPLANT PROSTHESIS N/A 12/30/2022    Procedure: Exploratory Laparotomy, lysis of adhesions, small bowel resection. Placement of gastric jejunostomy for enteral feeding.;  Surgeon: Elver Bauer MD;  [facility]    REMOVE GASTROSTOMY TUBE ADULT N/A 01/28/2022    Procedure: REMOVAL, GASTROSTOMY TUBE, ADULT;  Surgeon: Mandeep Park MD;  [facility]    REPLACE GASTROJEJUNOSTOMY TUBE, PERCUTANEOUS N/A 09/07/2021    Procedure: GASTROJEJUNOSTOMY TUBE PLACEMENT, PERCUTANEOUS, WITH GASTROPEXY;  Surgeon: Mandeep Park MD;  [facility]    REPLACE GASTROJEJUNOSTOMY TUBE, PERCUTANEOUS N/A 09/22/2021    Procedure: REPLACEMENT, GASTROJEJUNOSTOMY TUBE, PERCUTANEOUS;  Surgeon: Zackery Montoya MD;  [facility]    REPLACE GASTROJEJUNOSTOMY TUBE, PERCUTANEOUS N/A 11/22/2022    Procedure: REPLACEMENT, GASTROJEJUNOSTOMY TUBE, PERCUTANEOUS;  Surgeon: Mandeep Park MD;  [facility]    REPLACE GASTROJEJUNOSTOMY TUBE, PERCUTANEOUS N/A 12/09/2022    Procedure: REPLACEMENT, GASTROJEJUNOSTOMY TUBE, PERCUTANEOUS with ENDOSCOPIC SUTURING.;  Surgeon: Mandeep Park MD;  [facility]    REPLACE GASTROJEJUNOSTOMY TUBE, PERCUTANEOUS N/A 08/01/2023     Procedure: Replace Gastrojejunostomy Tube, Percutaneous;  Surgeon: Mandeep Park MD;  [facility]    REPLACE GASTROJEJUNOSTOMY TUBE, PERCUTANEOUS N/A 2023    Procedure: Replace Gastrojejunostomy Tube, Percutaneous;  Surgeon: Francisco Dodson MD;  [facility]    REPLACE GASTROJEJUNOSTOMY TUBE, PERCUTANEOUS N/A 2023    Procedure: Replace Gastrojejunostomy Tube, Percutaneous;  Surgeon: Mandeep Park MD;  [facility]    REPLACE JEJUNOSTOMY TUBE, PERCUTANEOUS N/A 09/10/2021    Procedure: UPPER ENDOSCOPY, REPLACEMENT OF PERCUTANEOUS GASTROJEJUNOSTOMY TUBE, T-TAG GASTROPEXY;  Surgeon: Zackery Montoya MD;  [facility]    REPLACE JEJUNOSTOMY TUBE, PERCUTANEOUS N/A 2021    Procedure: REPLACEMENT, JEJUNOSTOMY TUBE, PERCUTANEOUS;  Surgeon: Mandeep Park MD;  [facility]    REPLACE JEJUNOSTOMY TUBE, PERCUTANEOUS N/A 2023    Procedure: REPLACEMENT, JEJUNOSTOMY TUBE, PERCUTANEOUS;  Surgeon: Mandeep Park MD;  [facility]    REPLACE JEJUNOSTOMY TUBE, PERCUTANEOUS  2023    Procedure: Replace Jejunostomy Tube, Percutaneous;  Surgeon: Mandeep Park MD;  [facility]    REPLACE JEJUNOSTOMY TUBE, PERCUTANEOUS N/A 2024    Procedure: REPLACEMENT, JEJUNOSTOMY TUBE, PERCUTANEOUS;  Surgeon: Francisco Dodson MD;  [facility]    transphenoidal pituitary resection  1990    [facility]  DELIVERY ONLY  1996    [facility]  DELIVERY ONLY  1998    repeat c section with incidental cystotomy with repair    [facility] EXCIS PITUITARY,TRANSNASAL/SEPTAL  1980    pituitary tumor removed for diabetes insipidus    [facility] TOTAL ABDOM HYSTERECTOMY  1999    w/ bilateral salpingoophorectomy        Problem list:    Patient Active Problem List    Diagnosis Date Noted    Constipation 2024     Priority: Medium    Right upper quadrant abdominal pain 2024     Priority: Medium    S/P pancreatic islet cell transplantation (H)  07/23/2024     Priority: Medium    Bacteremia 11/10/2023     Priority: Medium    Abdominal pain, unspecified abdominal location 11/09/2023     Priority: Medium    Cholangitis (H28) 06/07/2023     Priority: Medium    On total parenteral nutrition (TPN) 06/04/2023     Priority: Medium    Fever, unspecified fever cause 06/04/2023     Priority: Medium    Chronic pancreatitis, unspecified pancreatitis type (H) 06/04/2023     Priority: Medium    History of food intolerance 05/09/2023     Priority: Medium    Malnutrition, unspecified type (H24) 05/09/2023     Priority: Medium    Nausea and vomiting, unspecified vomiting type 05/09/2023     Priority: Medium    Major depression, single episode 03/06/2023     Priority: Medium    Acute postoperative abdominal pain 01/31/2023     Priority: Medium    Feeding intolerance 12/30/2022     Priority: Medium    Myofascial pain 10/14/2022     Priority: Medium     Added automatically from request for surgery [id number]      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  pancreas and spleen removed, islet cells transplanted into liver    3 classes of vaccines needed .    1. Pneumococcal vaccines are as follows:       PCV 13 - one time dose (was given 2017)       PPSV23 - (given 12/1/16); repeat q 5 years      ROLE of PCV 20???    2. Meningococcal vaccines     Menactra - (last given 12/1/16) -  repeat q 5 yrs   NOTE: need to wait 4 wks after PCV 13 has been given.   OR - give Menveo instead as it can be given same time as PCV 13    AND   Bexcero - (got 12/9/16) - does not need to be repeated.     3. The Hib vaccine - (got 12/9/16) - does not need to be repeated.      Poisoning by drug 05/09/2022     Priority: Medium     Formatting of this note might be different from the original.  Per Care Everywhere review:  All oral, IV and injectable steroids are contraindicated (unless in life threatening situations) in Islet  Auto transplant recipients. They can cause irreversible loss of islet cell function. Please contact patient's transplant care coordinator ADI Gaffney RN at [phone]/pager [phone] and/or endocrinologist prior to administration.      Type 1 diabetes mellitus without complication (H) 05/09/2022     Priority: Medium     Formatting of this note might be different from the original.  ACTUALLY - DM 3c - pathogenic diabetes as no pancreas.  (pancreas and spleen removed, islet cells transplanted into liver)    Seeing ENDO at [facility] - Dr Erum Melissa      Intra-abdominal abscess (H) 12/03/2021     Priority: Medium    Postprocedural intraabdominal abscess 12/03/2021     Priority: Medium    Gastrostomy tube obstruction (H) 11/27/2021     Priority: Medium    Abdominal pain, generalized 09/18/2021     Priority: Medium    Adult failure to thrive 08/16/2021     Priority: Medium     Added automatically from request for surgery [id number]      Hypoglycemia 08/05/2021     Priority: Medium    Iron deficiency anemia 03/22/2019     Priority: Medium    Headache, chronic migraine without aura 09/18/2018     Priority: Medium    Chronic pain syndrome 09/18/2018     Priority: Medium    S/P hernia repair 08/23/2018     Priority: Medium    Incisional hernia, without obstruction or gangrene 05/20/2018     Priority: Medium    Adhesive capsulitis of shoulder, unspecified laterality 11/14/2017     Priority: Medium    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  Excess IgG4  [facility]      Gastroparesis      Priority: Medium    ACP (advance care planning) 03/28/2017     Priority: Medium     Advance Care Planning 3/28/2017: Receipt of ACP document:  Received: Health Care Directive which was witnessed or notarized on 12/8/16.  Document previously scanned on  1/12/17.  Validation form completed and scanned.  Code Status reflects choices in most recent ACP document..  Confirmed/documented designated decision maker(s).  Added by May Baires   Advance Care Planning Liaison              Pancreatic insufficiency 01/17/2017     Priority: Medium    Diabetes insipidus (H24) 01/05/2017     Priority: Medium     Formatting of this note might be different from the original.  Diabetes Insipidus (DI)  Per external records.  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  Depression NOS      Other type of intractable migraine      Priority: Medium     Diagnosis updated by automated process. Provider to review and confirm.      GERD (gastroesophageal reflux disease) 12/01/2010     Priority: Medium    Lumbago 04/18/2005     Priority: Medium     History of L5-S1 degenerative disk disease.        Sensorineural hearing loss 01/10/2005     Priority: Medium     Problem list name updated by automated process. Provider to review      Vertiginous syndrome and labyrinthine disorder 01/10/2005     Priority: Medium     Problem list name updated by automated process. Provider to review  IMO Regulatory Load OCT 2020      Sprain and strain of other specified sites of hip and thigh 12/09/2002     Priority: Medium    Chondromalacia of patella 12/09/2002     Priority: Medium    Need for prophylactic immunotherapy      Priority: Medium     trees, grass, srw, dust mites, cat      Allergic rhinitis due to other allergen 12/21/2001     Priority: Medium    Hypothyroidism      Priority: Medium     Problem list name updated  by automated process. Provider to review         Medications:  Current Outpatient Medications   Medication Sig Dispense Refill    acetaminophen (TYLENOL) 325 MG/10.15ML solution 20.3 mLs (650 mg) by Per J Tube route every 6 hours as needed for mild pain or fever 473 mL 4    baclofen (LIORESAL) 10 MG tablet 1-2 tablets (10-20 mg) by Per G Tube route 3 times daily 60 tablet 1    buprenorphine (SUBUTEX) 2 MG SUBL sublingual tablet Place 2 mg under the tongue 2 times daily      cetirizine (ZYRTEC) 10 MG tablet 1 tablet (10 mg) by Per G Tube route 2 times daily 60 tablet 11    COMPOUNDED NON-CONTROLLED SUBSTANCE (CMPD RX) - PHARMACY TO MIX COMPOUNDED MEDICATION Administer 60 mg amitriptyline 2 mg/ml via G-J tube at bedtime 900 mL 3    COMPOUNDED NON-CONTROLLED SUBSTANCE (CMPD RX) - PHARMACY TO MIX COMPOUNDED MEDICATION Administer 40 mg amitriptyline suspension (2 mg/mL) via G-J tube at bedtime. 600 mL 5    COMPOUNDED NON-CONTROLLED SUBSTANCE (CMPD RX) - PHARMACY TO MIX COMPOUNDED MEDICATION Place 1 enema rectally 2 times daily as needed (for constipation) 60 each 3    Continuous Blood Gluc Sensor (FREESTYLE LISA 3 SENSOR) MISC CHANGE EVERY 14 DAYS 2 each 11    Digestive Enzyme Cartridge (RELIZORB) MARCO 2 Cartridges daily 60 each 6    diphenhydrAMINE (BENADRYL) 12.5 MG/5ML solution Take 25 mg by mouth 4 times daily as needed for other (nausea)      DULoxetine HCl 60 MG CSDR 1 capsule (60 mg) by Per J Tube route daily Sprinkle caps for feeding tube 90 capsule 3    estradiol (VAGIFEM) 10 MCG TABS vaginal tablet INSERT 1 TABLET(10 MCG) VAGINALLY 2 TIMES A WEEK 24 tablet 2    famotidine (PEPCID) 40 MG/5ML suspension 2.5 mLs (20 mg) by Per J Tube route daily 50 mL 4    fluconazole (DIFLUCAN) 40 MG/ML suspension Take 5 mLs by mouth every 24 hours      glucagon 1 MG kit Give 0.1 to 0.15mg( 10-15 units on Insulin sryinge) subcutaneous  every 15 minutes PRN for hypoglycemia. Remix new kit q24hr. Needs up to 3 kit/week. 10 each 3  "   glucose 40 % GEL gel Take 15-30 g by mouth every 15 minutes as needed for low blood sugar 3 Tube 2    ibuprofen (ADVIL/MOTRIN) 400 MG tablet take 30 mls  by oral route  every 4 - 6 hours as needed      insulin aspart (NOVOLOG FLEXPEN) 100 UNIT/ML pen Take 1 unit if BG is 175-275, and 2 units if >275 up to every 4 hours as needed.  \"Prime\" pen needle with 2 unit airshot before giving, needs supply for 15 unit/day. 15 mL 11    insulin aspart (NOVOLOG PEN) 100 UNIT/ML pen Take 1 unit if BG is 175-275, and 2 units if >275 up to every 4 hours as needed.  \"Prime\" pen needle with 2 unit airshot before giving, needs supply for 15 unit/day. 15 mL 11    insulin detemir (LEVEMIR PEN) 100 UNIT/ML pen Take 7 to 10 units per day, adjust as directed by MD.  Do 2 unit 'prime' before dosing, needs 12 units/day supply. 15 mL 5    insulin syringe-needle U-100 (30G X 1/2\" 0.3 ML) 30G X 1/2\" 0.3 ML miscellaneous Use 3 syringes daily or as directed. 100 each 11    lactated ringers infusion Inject 1,000 mLs into the vein daily as needed      levothyroxine (SYNTHROID) 25 mcg/mL SUSP 5.48 mLs (137 mcg) by Per J Tube route daily 500 mL 3    lidocaine (LIDODERM) 5 % patch Place onto the skin every 24 hours To prevent lidocaine toxicity, patient should be patch free for 12 hrs daily.      lipase-protease-amylase (CREON) 99426-40742-197816 units CPEP per EC capsule Take 3-4 capsules by mouth 3 times daily (with meals) With oral meals 150 capsule 11    methocarbamol (ROBAXIN) 750 MG tablet Take 1 tablet (750 mg) by mouth 4 times daily as needed for muscle spasms 120 tablet 11    montelukast (SINGULAIR) 10 MG tablet Take by mouth every 24 hours      naloxegol (MOVANTIK) 12.5 MG TABS tablet Take 25 mg by mouth every morning (before breakfast)      Nutritional Supplements (BOOST HIGH PROTEIN) LIQD After above baseline labs are drawn, give: 6 mL/kg to maximum of 360 mL; the beverage is to be consumed within 5 minutes.  0    pantoprazole " "(PROTONIX) 40 mg IV push injection Inject 40 mg into the vein daily (with breakfast) 30 each 11    parenteral nutrition - PTA/DISCHARGE ORDER The TPN formula will print on the After Visit Summary Report. 1 each 0    prochlorperazine (COMPAZINE) 10 MG tablet Take 1 tablet (10 mg) by mouth every 6 hours as needed for nausea or vomiting 120 tablet 3    prochlorperazine (COMPRO) 25 MG suppository Place 1 suppository (25 mg) rectally every 12 hours as needed for nausea 10 suppository 2    promethazine 25 mg Inject 25 mg into the vein 3 times daily      promethazine-phenylephrine (PROMETHAZINE VC) 6.25-5 MG/5ML syrup Take 5 mLs by mouth every 6 hours      rimegepant (NURTEC) 75 MG ODT tablet Place 1 tablet (75 mg) under the tongue daily as needed for migraine Maximum of 1 tablet every 24 hours. 8 tablet 11    scopolamine (TRANSDERM) 1 MG/3DAYS 72 hr patch Place 1 patch onto the skin every 72 hours - Transdermal 10 patch 11    sennosides (SENOKOT) 8.8 MG/5ML syrup 5 mLs by Per J Tube route 2 times daily 236 mL 11    Sharps Container (BD SHARPS ) MISC 1 Container as needed 1 each 1    silver nitrate (ARZOL SILVER NIT APPLICATORS) 75-25 % miscellaneous Apply topically as needed for wound care Apply Silver Nitrate Sticks every 2-3 days until granulation tissue resolved.  \"Roll\" tip of Silver Nitrate Q tip directly onto the granulation tissue-bulging tissue area.  Have Agency or Home Care Nurse administer this, if you prefer.  Take care not to touch any healthy tissue or skin.  The tissue will turn white and eventually black & scab off.  May repeat if needed in the future for recurrence. 30 applicator 0    sodium phosphate, mineral oil, magnesium citrate enema Instill 1 pink 187 ml enema twice daily as needed for constipation      STATIN NOT PRESCRIBED (INTENTIONAL) Previous liver issues, risks vs benefits felt to not warrant statin.    Discussed Oct 2022 visit      SYNDROS 5 MG/ML oral soln TAKE 0.5 ML BY MOUTH 2 " TIMES DAILY 60 mL 0    zinc oxide - white petrolatum (CRITIC-AID THICK MOIST BARRIER) 20-51% PSTE topical paste Apply 71 g topically every hour as needed for rash (Under J tube bumper when needed for skin protection) 170 g 0    ZOLMitriptan (ZOMIG-ZMT) 5 MG ODT Take 1 tablet (5 mg) by mouth at onset of headache for migraine Dissolve tablet in the mouth. May repeat in 2 hours. Max 2 tablets/24 hours. 12 tablet 6    polyethylene glycol (MIRALAX) 17 GM/Dose powder Please take 238 grams mixed with 64 oz of Gatorade at 4pm the night before surgery 238 g 0       Allergies:  Allergies   Allergen Reactions    Apple Juice Anaphylaxis    Corticosteroids Other (See Comments)     All oral, IV and injectable steroids are contraindicated (unless in life threatening situations) in [facility] transplant recipients. They can cause irreversible loss of islet cell function. Please contact patient's transplant care coordinator ADI Gaffney RN at [phone]/pager [phone] and/or endocrinologist prior to administration.      Depakote [Divalproex Sodium] Other (See Comments)     Chest pain    Zithromax [Azithromycin Dihydrate] Anaphylaxis     Noted in 4/7/08 ER    Bromfenac      Other reaction(s): Headache    Codeine      Other reaction(s): Hallucinations    Darvocet [Propoxyphene N-Apap] Itching    Morphine Nausea and Vomiting and Rash    Nalbuphine Hcl Rash     RASH :nubaine    Zosyn [Piperacillin-Tazobactam In Dex] Rash     Possible allergy, did have a diffuse rash that seemed drug related but could have also been related to soap in the hospital.     Bactrim [Sulfamethoxazole W-Trimethoprim] Other (See Comments) and Nausea and Vomiting     Severely low liver function.    Statins Other (See Comments)     Previous liver issues, risks vs benefits felt to not warrant statin.  Discussed Oct 2022 visit    Tape [Adhesive Tape] Blisters    Tramadol     Zofran [Ondansetron] Other (See Comments)     migraine    Flagyl [Metronidazole]  Hives and Rash       Family history:  Family History   Problem Relation Age of Onset    Lipids Mother     Hypertension Mother     Thyroid Disease Mother     Depression Mother     Angina Mother     GERD Mother     Skin Cancer Mother     Migraines Mother     Autoimmune Disease Mother     Hyperlipidemia Mother     Mental Illness Mother     Cerebrovascular Disease Mother         11/2023    Other Cancer Mother         skin-basil cell    Anxiety Disorder Mother     Eye Disorder Father         cataract, detached retina    Myocardial Infarction Father 60    Lipids Father     Cerebrovascular Disease Father     Depression Father     Substance Abuse Father     Anesthesia Reaction Father         stroke right after surgery    Cataracts Father     Osteoarthritis Father     Ulcerative Colitis Father     Autoimmune Disease Father     Heart Disease Father     Hyperlipidemia Father     Mental Illness Father     Other Cancer Father         myloma    Anxiety Disorder Father     Interpersonal Violence Sister     Coronary Artery Disease Sister         angioplasty    GERD Sister     Substance Abuse Sister     Cerebrovascular Disease Sister         2005    Depression Sister     Thyroid Disease Sister     Eye Disorder Maternal Grandmother         cataract    Thyroid Disease Maternal Grandmother     Diabetes Maternal Grandfather     Eye Disorder Paternal Grandmother         cataract    Diabetes Paternal Grandmother     Eye Disorder Paternal Grandfather         cataract    Diabetes Paternal Grandfather     Substance Abuse Paternal Grandfather     Eye Disorder Son         ptosis    Depression Son     Anxiety Disorder Son     Heart Disease Paternal Aunt     Diabetes Paternal Aunt     Diabetes Paternal Uncle     Heart Disease Paternal Uncle     Depression Nephew     Anxiety Disorder Nephew     Thyroid Disease Nephew     Diabetes Type 2  Cousin         paternal cousin    Autoimmune Disease Cousin     Autoimmune Disease Sister     Depression  Sister     Mental Illness Sister     Substance Abuse Sister     Thyroid Disease Sister     Depression Son     Mental Illness Son     Anxiety Disorder Son     Thyroid Disease Nephew     Anxiety Disorder Nephew        Social history:  Social History     Tobacco Use    Smoking status: Former     Current packs/day: 0.00     Average packs/day: 0.5 packs/day for 6.3 years (3.2 ttl pk-yrs)     Types: Cigarettes     Start date: 1985     Quit date: 1992     Years since quittin.6    Smokeless tobacco: Not on file    Tobacco comments:     no 2nd hand   Substance Use Topics    Alcohol use: Not Currently     Alcohol/week: 3.0 - 6.0 standard drinks of alcohol     Types: 1 - 2 Glasses of wine, 1 - 2 Cans of beer, 1 - 2 Shots of liquor per week     Comment: none since IGG    Marital status: .    Nursing Notes:   Herbie Kasper, EMT  2024  3:46 PM  Signed  Chief Complaint   Patient presents with    Follow Up       There were no vitals filed for this visit.    There is no height or weight on file to calculate BMI.    Herbie Kasper EMT-P         Kaylene Branch MD  Colon and Rectal Surgery Staff  [facility]      This note was created using speech recognition software and may contain unintended word substitutions.

## 2024-08-11 ASSESSMENT — HEADACHE IMPACT TEST (HIT 6)
WHEN YOU HAVE A HEADACHE HOW OFTEN DO YOU WISH YOU COULD LIE DOWN: VERY OFTEN
HOW OFTEN HAVE YOU FELT TOO TIRED TO WORK BECAUSE OF YOUR HEADACHES: RARELY
HOW OFTEN HAVE YOU FELT FED UP OR IRRITATED BECAUSE OF YOUR HEADACHES: RARELY
HOW OFTEN DO HEADACHES LIMIT YOUR DAILY ACTIVITIES: SOMETIMES
HOW OFTEN DID HEADACHS LIMIT CONCENTRATION ON WORK OR DAILY ACTIVITY: SOMETIMES
WHEN YOU HAVE HEADACHES HOW OFTEN IS THE PAIN SEVERE: VERY OFTEN
HIT6 TOTAL SCORE: 58

## 2024-08-12 ENCOUNTER — PATIENT OUTREACH (OUTPATIENT)
Dept: GASTROENTEROLOGY | Facility: CLINIC | Age: 59
End: 2024-08-12
Payer: COMMERCIAL

## 2024-08-12 ENCOUNTER — VIRTUAL VISIT (OUTPATIENT)
Dept: ONCOLOGY | Facility: CLINIC | Age: 59
End: 2024-08-12
Attending: SOCIAL WORKER
Payer: COMMERCIAL

## 2024-08-12 VITALS — BODY MASS INDEX: 20.46 KG/M2 | HEIGHT: 68 IN | WEIGHT: 135 LBS

## 2024-08-12 DIAGNOSIS — Z87.19 HISTORY OF FOOD INTOLERANCE: ICD-10-CM

## 2024-08-12 DIAGNOSIS — E46 MALNUTRITION, UNSPECIFIED TYPE (H): ICD-10-CM

## 2024-08-12 DIAGNOSIS — F43.23 ADJUSTMENT DISORDER WITH MIXED ANXIETY AND DEPRESSED MOOD: Primary | ICD-10-CM

## 2024-08-12 DIAGNOSIS — R62.7 ADULT FAILURE TO THRIVE: ICD-10-CM

## 2024-08-12 DIAGNOSIS — Z46.59 ENCOUNTER FOR CARE RELATED TO FEEDING TUBE: Primary | ICD-10-CM

## 2024-08-12 DIAGNOSIS — R10.84 ABDOMINAL PAIN, GENERALIZED: ICD-10-CM

## 2024-08-12 PROCEDURE — 90834 PSYTX W PT 45 MINUTES: CPT | Mod: 95 | Performed by: SOCIAL WORKER

## 2024-08-12 RX ORDER — DRONABINOL 5 MG/ML
SOLUTION ORAL
Qty: 60 ML | Refills: 0 | Status: SHIPPED | OUTPATIENT
Start: 2024-08-12

## 2024-08-12 ASSESSMENT — PAIN SCALES - GENERAL: PAINLEVEL: EXTREME PAIN (8)

## 2024-08-12 NOTE — NURSING NOTE
Patient confirms medications and allergies are accurate via patients echeck in completion, and or denies any changes since last reviewed/verified.    Current patient location: [address] [facility] [address]    Is the patient currently in the state of [place]? YES    Visit mode:VIDEO    If the visit is dropped, the patient can be reconnected by: VIDEO VISIT: Text to cell phone:   Telephone Information:   Mobile [phone]       Will anyone else be joining the visit? NO  (If patient encounters technical issues they should call [phone]    How would you like to obtain your AVS? MyChart    Are changes needed to the allergy or medication list? No    Are refills needed on medications prescribed by this physician? NO    Rooming Documentation:  Assigned questionnaire(s) completed      Reason for visit: RECHECK    Catarina FLETCHER

## 2024-08-12 NOTE — TELEPHONE ENCOUNTER
SYNDROS 5MG/ML SOLN       Take 0.5 mLs (2.5 mg) by mouth 2 times daily - Oral  Last Written Prescription Date:  7-5-24  Last Fill Quantity: 60 ml,   # refills: 0  Last Office Visit : 6-13-24  Future Office visit:  none    Routing refill request to provider for review/approval because:  Drug not on the [facility], P or [facility] refill protocol or controlled substance

## 2024-08-12 NOTE — PROGRESS NOTES
Virtual Visit Details    Type of service:  Video Visit     Originating Location (pt. Location): Home    Distant Location (provider location):  Off-site  Platform used for Video Visit: [employer]    Virtual Visit Details    Type of service:  Video Visit     Originating Location (pt. Location): Home    Distant Location (provider location):  Off-site  Platform used for Video Visit: [facility]          Palliative Care Clinical Social Work Return Appointment    PLEASE NOTE:  THIS IS A MENTAL HEALTH NOTE.  OTHER PROVIDERS VIEWING THIS NOTE SHOULD USE THIS ONLY FOR UNDERSTANDING THE CONTEXT OF THE PATIENT'S EXPERIENCE.  TOPICS DESCRIBED IN THIS NOTE SHOULD NOT BE REFERENCED TO THE PATIENT BY MEDICAL PROVIDERS.    Dinora Mcghee is a 58  year old woman with multiple medical conditions including chronic pancreatitis s/p TPAIT, long term complications from gastroparesis, constipation, GERD, migraines.  Had GJ placed Sept 2021, recovery was complicated and she ended up with a venting g-tube.  Due to complications with PO tolerance she had surgical placement of a J tube, and a resection of densely adhered small bowel.  Started on tube feedings, then on TPN.  TPN eventually stopped due to infection.  Has heavy symptom burden that includes chronic nausea and vomiting, fatigue, complex medical management.  Has had multiple ER visits for GI and Bowel concerns. Since last visit was seen at [facility] for assessment and they also recommended surgical resection or bypass of her colon.  Seen today for counseling for adjustment disorder with depressed mood and anxiety.          Mental Status Exam:(List all that apply)      Appearance: Appropriate      Eye Contact: Good       Orientation: Yes, x4      Mood: Normal       Affect: Appropriate       Thought Content: Clear         Thought Form: Logical      Psychomotor Behavior: Normal    Visit themes:  grief, adjustment to illness, resilience     Adjustment to Illness: Nausea and pain today;        Mental Health (thoughts, feelings, actions, coping, and symptoms):  Feeling nauseated and frustrated today.  Now that decision has been made to proceed with surgery to remove or bypass colon, wants to know when surgery will take place and what to prepare for. Explored how to sit in this space of limbo and uncertainty for now, meeting with team will take place on Wednesday of this week and she is hoping she will know more after that.    Grief for how poorly she has felt this summer causing her to miss out on going outside and attending events such as her daughter's triathlon. Had to leave a bridal shower early due to nausea and pain.     Helpful activities:       Helpful cognitions:     Unhelpful and/or triggering or exacerbating factors:     Body-Mind Skills Education: has training in behavioral interventions and uses tapping, EMDR, breathing exercises, guided imagery     Relationships:  and children, extended family    End of Life and Advance Care Planning:     Main therapeutic interventions provided this session include:     Provide psychoeducation and Facilitate processing of thoughts and feelings    Plan:  Will return for psychotherapy with palliative care focus in 1 month.        Time spent with patient/family:    51 minutes  (Start 2:00 end 2:51)    JAIDA Nguyen, [facility]   Palliative Care Clinical   Ph: [phone]      DO NOT SEND ANY LETTERS

## 2024-08-12 NOTE — LETTER
8/12/2024      Dinora Mcghee  [address] [facility] [address]      Dear Colleague,    Thank you for referring your patient, Dinora Mcghee, to the [facility] [place]. Please see a copy of my visit note below.    Virtual Visit Details    Type of service:  Video Visit     Originating Location (pt. Location): Home    Distant Location (provider location):  Off-site  Platform used for Video Visit: [facility]    Virtual Visit Details    Type of service:  Video Visit     Originating Location (pt. Location): Home    Distant Location (provider location):  Off-site  Platform used for Video Visit: [facility]          Palliative Care Clinical Social Work Return Appointment    PLEASE NOTE:  THIS IS A MENTAL HEALTH NOTE.  OTHER PROVIDERS VIEWING THIS NOTE SHOULD USE THIS ONLY FOR UNDERSTANDING THE CONTEXT OF THE PATIENT'S EXPERIENCE.  TOPICS DESCRIBED IN THIS NOTE SHOULD NOT BE REFERENCED TO THE PATIENT BY MEDICAL PROVIDERS.    Dinora Mcghee is a 58  year old woman with multiple medical conditions including chronic pancreatitis s/p TPAIT, long term complications from gastroparesis, constipation, GERD, migraines.  Had GJ placed Sept 2021, recovery was complicated and she ended up with a venting g-tube.  Due to complications with PO tolerance she had surgical placement of a J tube, and a resection of densely adhered small bowel.  Started on tube feedings, then on TPN.  TPN eventually stopped due to infection.  Has heavy symptom burden that includes chronic nausea and vomiting, fatigue, complex medical management.  Has had multiple ER visits for GI and Bowel concerns. Since last visit was seen at [facility] for assessment and they also recommended surgical resection or bypass of her colon.  Seen today for counseling for adjustment disorder with depressed mood and anxiety.          Mental Status Exam:(List all that apply)      Appearance: Appropriate      Eye Contact: Good       Orientation: Yes, x4       Mood: Normal       Affect: Appropriate       Thought Content: Clear         Thought Form: Logical      Psychomotor Behavior: Normal    Visit themes:  grief, adjustment to illness, resilience     Adjustment to Illness: Nausea and pain today;       Mental Health (thoughts, feelings, actions, coping, and symptoms):  Feeling nauseated and frustrated today.  Now that decision has been made to proceed with surgery to remove or bypass colon, wants to know when surgery will take place and what to prepare for. Explored how to sit in this space of limbo and uncertainty for now, meeting with team will take place on Wednesday of this week and she is hoping she will know more after that.    Grief for how poorly she has felt this summer causing her to miss out on going outside and attending events such as her daughter's triathlon. Had to leave a bridal shower early due to nausea and pain.     Helpful activities:       Helpful cognitions:     Unhelpful and/or triggering or exacerbating factors:     Body-Mind Skills Education: has training in behavioral interventions and uses tapping, EMDR, breathing exercises, guided imagery     Relationships:  and children, extended family    End of Life and Advance Care Planning:     Main therapeutic interventions provided this session include:     Provide psychoeducation and Facilitate processing of thoughts and feelings    Plan:  Will return for psychotherapy with palliative care focus in 1 month.        Time spent with patient/family:    51 minutes  (Start 2:00 end 2:51)    JAIDA Nguyen, [facility]   Palliative Care Clinical   Ph: [phone]      DO NOT SEND ANY LETTERS                Again, thank you for allowing me to participate in the care of your patient.        Sincerely,        VASQUEZ Nguyen

## 2024-08-12 NOTE — TELEPHONE ENCOUNTER
"Patient still having \"excruciating pain\" at her Gtube site. States she's treated external area for granuloma, feels like there might be some inside. Using all her pain meds, lidocaine patches. If she doesn't move, there's no pain.  offered to take her to ER, wondering about next steps.     Dr. Montoya exchanged in on 7/24    ML  "

## 2024-08-13 NOTE — TELEPHONE ENCOUNTER
Returned call to patient regarding gtube pain. Dr Park recommending interventional pain blocks, Orders placed. Patient understands plan    ML

## 2024-08-14 ENCOUNTER — PATIENT OUTREACH (OUTPATIENT)
Dept: CARE COORDINATION | Facility: CLINIC | Age: 59
End: 2024-08-14

## 2024-08-14 ENCOUNTER — VIRTUAL VISIT (OUTPATIENT)
Dept: SURGERY | Facility: CLINIC | Age: 59
End: 2024-08-14
Payer: COMMERCIAL

## 2024-08-14 DIAGNOSIS — K59.01 SLOW TRANSIT CONSTIPATION: Primary | ICD-10-CM

## 2024-08-14 PROCEDURE — 99214 OFFICE O/P EST MOD 30 MIN: CPT | Mod: 95 | Performed by: COLON & RECTAL SURGERY

## 2024-08-14 RX ORDER — POLYETHYLENE GLYCOL 3350 17 G/17G
POWDER, FOR SOLUTION ORAL
Qty: 238 G | Refills: 0 | Status: SHIPPED | OUTPATIENT
Start: 2024-08-14

## 2024-08-14 RX ORDER — MAGNESIUM CARB/ALUMINUM HYDROX 105-160MG
296 TABLET,CHEWABLE ORAL ONCE
Qty: 296 ML | Refills: 0 | Status: SHIPPED | OUTPATIENT
Start: 2024-08-14 | End: 2024-08-14

## 2024-08-14 ASSESSMENT — PAIN SCALES - GENERAL: PAINLEVEL: MODERATE PAIN (5)

## 2024-08-14 NOTE — NURSING NOTE
Chief Complaint   Patient presents with    Follow Up       There were no vitals filed for this visit.    There is no height or weight on file to calculate BMI.    Herbie Kasper EMT-P

## 2024-08-14 NOTE — LETTER
2024       RE: Dinora Mcghee  [address] [facility] [address]     Dear Colleague,    Thank you for referring your patient, Dinora Mcghee, to the [facility] COLON AND RECTAL SURGERY CLINIC [place] at [facility]. Please see a copy of my visit note below.    Colon and Rectal Surgery Follow Up Video Note    RE: Dinora Mcghee  : 1965  HAIM: 2024    Dinora Mcghee is a 59 year old female who is being evaluated via a billable video visit.      Patient has given verbal consent for Video visit? 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     History of Present Illness:  History of acute pancreatitis which was diagnosis in  at [facility]. An endoscopic retrograde cholangiopancreatography at that time was performed with issues with the pancreatic duct eventually with chronic pancreatitis ultimately s/p total pancreatectomy and islet autotransplant. Postoperatively she developed type 1 diabetes mellitus.   Diagnosed with gastroparesis and chronic pancreatitis by Dr. Park in 2015 with multiple hospitalizations, stents.   As well as at [place] in 2016 had a GJ tube placed. This helped initially, and after her TPIAT she also experienced some improvement. She had a few years that she was able to eat but eventually transitioned back to tube feeds via her J-tube. She has a venting G-tube and is on chronic antiemetics (daily scopolamine patch, IV promethazine, J-tube promethazine, and compazine).   Chronic constipation since having children over 20 years ago. She has been on multiple medications  including Miralax, senna, milk of magnesia, Linzess, Amitiza (lost effectiveness), Trulance (caused diarrhea and fecal incontinence), Motegrity.   Her current regimen is Motegrity, Senna BID, Miralax prn. With this she still has constipation and in 2024 she presented to a local ED, where she received two enemas with improvement. She also has had abdominal x-rays with gastrografin enema and passes a significant amount of stool following these. She follows with Dr. Genao who recommended daily pink lady enemas. However, this caused nocturnal fecal leakage and so the enemas were decreased to every other day.   Pelvic floor testing completed on 24 suggestive of pelvic floor dysfunction and obstructive defecation. On defecography, she was unable to evacuate any rectal contrast due to non-relaxation.     She recently did pelvic floor physical therapy with biofeedback. Her last visit was in 2024 and there was some improvement in relaxation in her pelvic floor muscles.  She has a complex past surgical history includin, , : C-sections  : laparoscopic cholecystectomy  : total abdominal hysterectomy with bilateral salpingoopherectomy  Appendectomy  2016: laparoscopic total pancreatectomy, islet cell autotransplant, splenectomy, gastrojejunostomy tube duodenojejunostomy, Vera-Y reconstruction, liver biopsy, lysis of adhesions >60 minutes  2018: laparoscopic incisional hernia (2 cm close to umbilicus) repair with 12 cm round Parietex mesh  2022: exploratory laparotomy, extensive lysis of adhesions, small bowel resection, placement of gastric jejunostomy for enteral feeding  2023: exploratory laparotomy, lysis of adhesions, perforated jejunostomy tube resection, replacement of jejunostomy tube  Colonoscopy 2023 with two 2 mm tubular adenomas, melanosis coli in cecum and ascending colon, normal random biopsies, sigmoid diverticulosis, internal and external hemorrhoids.    Interval  "History: I met Dinora on 5/15/24 and we discussed surgical options for her constipation and pelvic floor dysfunction. We discussed a total abdominal colectomy with ileorectal anastomosis versus end ileostomy. She wanted to think about these options and returns today to discuss surgery. She is having more issues now with the G-tube and the tube is \"pulling\" inwards and abrading skin. She is also concerned about having surgery and recovery with her son getting  in about one month and wanting both to feel well as well as be recovered in time which is going to potentially be challenging logistically.       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   We previously discussed surgical intervention with a total abdominal colectomy and ileorectal anastomosis and dlI versus end ileostomy. The former would be a 2 stage procedure and also have less risk since the ileorectal anastomosis will be diverted while healing She is interested in proceeding with a total abdominal colectomy with ileorectal anastomosis. This will also involve a temporary diverting loop ileostomy.   She will need full bowel prep with antibiotics, enemas, and stoma marking. She will also work with wound care ostomy nursing on education. She does have adhesive that works (as well as some that cause skin reactions)  Patient's questions were answered to her stated satisfaction and she is in agreement with this plan. We will do our best with the schedule to accommodate.    Video-Visit Details    Type of service:  Video Visit    Video End Time (time video stopped): 4:50 PM    Originating Location (pt. Location): Home    Distant Location (provider location):  [facility] [place] COLON AND RECTAL SURGERY CLINIC [place]     Mode of Communication:  Video Conference via [email or web address]    30 minutes spent on the date of the encounter doing chart review, history, review of imaging, documentation ,and further activities as noted above, which includes my time spent on video with the patient/family.       Medical history:  Past Medical History:   Diagnosis Date     Allergic rhinitis, cause unspecified      Allergy to other foods     strawberries, apples, celeries, alice, watermelon     Arthritis     left knee     Choledocholithiasis     long after cholecystectomy     Chronic abdominal pain      Chronic constipation      Chronic nausea      Chronic pancreatitis (H)      Degeneration of lumbar or lumbosacral intervertebral disc      Esophageal reflux     w/ hiatal hernia     Gastroparesis      Hiatal hernia      History of pituitary adenoma     s/p resection     Hypothyroidism      Migraines      Mild hyperlipidemia      On tube feeding diet     presence of GJ tube     Pancreatic disease     PD stricture, 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 endometriosis growth     APPENDECTOMY        SECTION  1993     COLONOSCOPY  2014     COLONOSCOPY N/A 2023    Procedure: COLONOSCOPY, WITH POLYPECTOMY AND BIOPSY;  Surgeon: Percy Chamorro MD;  [facility]     ENDOSCOPIC INSERTION TUBE GASTROSTOMY N/A 2021    Procedure: Gastrojejonostomy placement with jejunopexy, PEG tube exchange;  Surgeon: Zackery Montoya MD;  [facility]     ENDOSCOPIC RETROGRADE CHOLANGIOPANCREATOGRAM N/A 2015    Procedure: ENDOSCOPIC RETROGRADE  CHOLANGIOPANCREATOGRAM;  Surgeon: Mandeep Park MD;  [facility]     ENDOSCOPIC RETROGRADE CHOLANGIOPANCREATOGRAM N/A 02/09/2016    Procedure: COMBINED ENDOSCOPIC RETROGRADE CHOLANGIOPANCREATOGRAPHY, PLACE TUBE/STENT;  Surgeon: Mandeep Park MD;  [facility]     ENDOSCOPIC RETROGRADE CHOLANGIOPANCREATOGRAM N/A 03/17/2016    Procedure: COMBINED ENDOSCOPIC RETROGRADE CHOLANGIOPANCREATOGRAPHY, REMOVE FOREIGN BODY OR STENT/TUBE;  Surgeon: Mandeep Park MD;  [facility]     ENDOSCOPIC RETROGRADE CHOLANGIOPANCREATOGRAM N/A 08/02/2016    Procedure: COMBINED ENDOSCOPIC RETROGRADE CHOLANGIOPANCREATOGRAPHY, PLACE TUBE/STENT;  Surgeon: Mandeep Park MD;  [facility]     ENDOSCOPIC RETROGRADE CHOLANGIOPANCREATOGRAM N/A 08/26/2016    Procedure: COMBINED ENDOSCOPIC RETROGRADE CHOLANGIOPANCREATOGRAPHY, REMOVE FOREIGN BODY OR STENT/TUBE;  Surgeon: Mandeep Park MD;  Location:  OR     ENDOSCOPIC ULTRASOUND UPPER GASTROINTESTINAL TRACT (GI) N/A 10/03/2016    Procedure: ENDOSCOPIC ULTRASOUND, ESOPHAGOSCOPY / UPPER GASTROINTESTINAL TRACT (GI);  Surgeon: Guru Jose Rodas MD;  Location:  OR     ENT SURGERY  1989    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     ENTEROSCOPY SMALL BOWEL N/A 02/03/2022    Procedure: ENTEROSCOPY with possible fistula closure;  Surgeon: Francisco Dodson MD;  Location:  GI     ESOPHAGOSCOPY, GASTROSCOPY, DUODENOSCOPY (EGD), COMBINED N/A 06/24/2015    Procedure: COMBINED ESOPHAGOSCOPY, GASTROSCOPY, DUODENOSCOPY (EGD), REMOVE FOREIGN BODY;  Surgeon: Mandeep Park MD;  Location:  GI     ESOPHAGOSCOPY, GASTROSCOPY, DUODENOSCOPY (EGD), COMBINED N/A 10/25/2015    Procedure: COMBINED ESOPHAGOSCOPY, GASTROSCOPY, DUODENOSCOPY (EGD);  Surgeon: Sammy Amaro MD;  Location:  GI     ESOPHAGOSCOPY, GASTROSCOPY, DUODENOSCOPY (EGD), COMBINED N/A 10/25/2015    Procedure: COMBINED ESOPHAGOSCOPY, GASTROSCOPY, DUODENOSCOPY (EGD),  BIOPSY SINGLE OR MULTIPLE;  Surgeon: Sammy Amaro MD;  [facility]     ESOPHAGOSCOPY, GASTROSCOPY, DUODENOSCOPY (EGD), COMBINED N/A 01/31/2023    Procedure: ESOPHAGOGASTRODUODENOSCOPY (EGD) with peg replacement ;  Surgeon: Mandeep Park MD;  [facility]     ESOPHAGOSCOPY, GASTROSCOPY, DUODENOSCOPY (EGD), COMBINED N/A 7/24/2024    Procedure: Esophagoscopy, gastroscopy, duodenoscopy (EGD), combined;  Surgeon: Zackery Montoya MD;  [facility]     ESOPHAGOSCOPY, GASTROSCOPY, DUODENOSCOPY (EGD), DILATATION, COMBINED       EXCISE LESION TRUNK N/A 04/17/2017    Procedure: EXCISE LESION TRUNK;  Removal of Abdominal Foreign Body;  Surgeon: Nestor Phoenix MD;  [facility]     HC ESOPH/GAS REFLUX TEST W NASAL IMPED >1 HR N/A 11/19/2015    Procedure: ESOPHAGEAL IMPEDENCE FUNCTION TEST WITH 24 HOUR PH GREATER THAN 1 HOUR;  Surgeon: Thiago Apple MD;  [facility]     HC UGI ENDOSCOPY DIAG W BIOPSY  09/17/2008     HC UGI ENDOSCOPY DIAG W BIOPSY  09/27/2012     HC UGI ENDOSCOPY W ESOPHAGEAL DILATION BALLOON <30MM  09/17/2008     HC UGI ENDOSCOPY W EUS N/A 05/05/2015    Procedure: COMBINED ENDOSCOPIC ULTRASOUND, ESOPHAGOSCOPY, GASTROSCOPY, DUODENOSCOPY (EGD);  Surgeon: Wm Dueñas MD;  [facility]     HC WRIST ARTHROSCOP,RELEASE XVERS LIG Bilateral 12/17/2008     INJECT TRANSVERSUS ABDOMINIS PLANE (TAP) BLOCK BILATERAL Left 09/22/2016    Procedure: INJECT TRANSVERSUS ABDOMINIS PLANE (TAP) BLOCK BILATERAL;  Surgeon: Dickson Corrigan MD;  [facility]     INJECT TRIGGER POINT Bilateral 09/08/2022    Procedure: Abdominal trigger point injection with ultrasound;  Surgeon: Monika Mahajan MD;  [facility]     INJECT TRIGGER POINT SINGLE / MULTIPLE 3 OR MORE MUSCLES Right 11/15/2022    Procedure: Trigger point injections right abdomen with ultrasound guidance;  Surgeon: Monika Mahajan MD;  [facility]     IR CHEST PORT PLACEMENT < 5 YRS OF AGE  06/10/2022      IR CVC TUNNEL PLACEMENT > 5 YRS OF AGE  4/15/2024     IR PORT REMOVAL RIGHT  11/09/2023     laparoscopic pineda  01/01/1995     LAPAROSCOPIC HERNIORRHAPHY INCISIONAL N/A 08/23/2018    Procedure: LAPAROSCOPIC HERNIORRHAPHY INCISIONAL;  Laparoscopic Incisional Hernia Repair with Symbotex Mesh Implant;  Surgeon: Nestor Phoenix MD;  [facility]     LAPAROSCOPIC PANCREATECTOMY, TRANSPLANT AUTO ISLET CELL N/A 12/28/2016    Procedure: LAPAROSCOPIC PANCREATECTOMY, TRANSPLANT AUTO ISLET CELL;  Surgeon: Nestor Phoenix MD;  [facility]     LAPAROTOMY EXPLORATORY N/A 01/31/2023    Procedure: Exploratory Laparotomy, lysis of adhesions, Perforated J-Junostomy Resection, Replace J-Junostomy site;  Surgeon: Elver Bauer MD;  [facility]     LAPAROTOMY, LYSIS ADHESIONS, COMBINED N/A 01/31/2023    Procedure: Dilatation of jejunostomy feeding tube, track with placement of jejunostomy tube with fluoroscopy;  Surgeon: Elver Bauer MD;  [facility]     PICC DOUBLE LUMEN PLACEMENT Left 11/13/2023    Basilic Vein 5F DL 43 cm     REMOVE AND REPLACE BREAST IMPLANT PROSTHESIS N/A 12/30/2022    Procedure: Exploratory Laparotomy, lysis of adhesions, small bowel resection. Placement of gastric jejunostomy for enteral feeding.;  Surgeon: Elver Bauer MD;  [facility]     REMOVE GASTROSTOMY TUBE ADULT N/A 01/28/2022    Procedure: REMOVAL, GASTROSTOMY TUBE, ADULT;  Surgeon: Mandeep Park MD;  [facility]     REPLACE GASTROJEJUNOSTOMY TUBE, PERCUTANEOUS N/A 09/07/2021    Procedure: GASTROJEJUNOSTOMY TUBE PLACEMENT, PERCUTANEOUS, WITH GASTROPEXY;  Surgeon: Mandeep Park MD;  [facility]     REPLACE GASTROJEJUNOSTOMY TUBE, PERCUTANEOUS N/A 09/22/2021    Procedure: REPLACEMENT, GASTROJEJUNOSTOMY TUBE, PERCUTANEOUS;  Surgeon: Zackery Montoya MD;  [facility]     REPLACE GASTROJEJUNOSTOMY TUBE, PERCUTANEOUS N/A 11/22/2022    Procedure: REPLACEMENT,  GASTROJEJUNOSTOMY TUBE, PERCUTANEOUS;  Surgeon: Mandeep Park MD;  [facility]     REPLACE GASTROJEJUNOSTOMY TUBE, PERCUTANEOUS N/A 2022    Procedure: REPLACEMENT, GASTROJEJUNOSTOMY TUBE, PERCUTANEOUS with ENDOSCOPIC SUTURING.;  Surgeon: Mandeep Park MD;  [facility]     REPLACE GASTROJEJUNOSTOMY TUBE, PERCUTANEOUS N/A 2023    Procedure: Replace Gastrojejunostomy Tube, Percutaneous;  Surgeon: Mandeep Park MD;  [facility]     REPLACE GASTROJEJUNOSTOMY TUBE, PERCUTANEOUS N/A 2023    Procedure: Replace Gastrojejunostomy Tube, Percutaneous;  Surgeon: Francisco Dodson MD;  [facility]     REPLACE GASTROJEJUNOSTOMY TUBE, PERCUTANEOUS N/A 2023    Procedure: Replace Gastrojejunostomy Tube, Percutaneous;  Surgeon: Mandeep Park MD;  [facility]     REPLACE JEJUNOSTOMY TUBE, PERCUTANEOUS N/A 09/10/2021    Procedure: UPPER ENDOSCOPY, REPLACEMENT OF PERCUTANEOUS GASTROJEJUNOSTOMY TUBE, T-TAG GASTROPEXY;  Surgeon: Zackery Montoya MD;  [facility]     REPLACE JEJUNOSTOMY TUBE, PERCUTANEOUS N/A 2021    Procedure: REPLACEMENT, JEJUNOSTOMY TUBE, PERCUTANEOUS;  Surgeon: Mandeep Park MD;  [facility]     REPLACE JEJUNOSTOMY TUBE, PERCUTANEOUS N/A 2023    Procedure: REPLACEMENT, JEJUNOSTOMY TUBE, PERCUTANEOUS;  Surgeon: Mandeep Park MD;  [facility]     REPLACE JEJUNOSTOMY TUBE, PERCUTANEOUS  2023    Procedure: Replace Jejunostomy Tube, Percutaneous;  Surgeon: Mandeep Park MD;  [facility]     REPLACE JEJUNOSTOMY TUBE, PERCUTANEOUS N/A 2024    Procedure: REPLACEMENT, JEJUNOSTOMY TUBE, PERCUTANEOUS;  Surgeon: Francisco Dodson MD;  [facility]     transphenoidal pituitary resection  1990     [facility]  DELIVERY ONLY  1996     [facility]  DELIVERY ONLY  1998    repeat c section with incidental cystotomy with repair     ZZC EXCIS PITUITARY,TRANSNASAL/SEPTAL   01/01/1980    pituitary tumor removed for diabetes insipidus     ZZC TOTAL ABDOM HYSTERECTOMY  01/01/1999    w/ bilateral salpingoophorectomy        Problem list:    Patient Active Problem List    Diagnosis Date Noted     Constipation 07/23/2024     Priority: Medium     Right upper quadrant abdominal pain 07/23/2024     Priority: Medium     S/P pancreatic islet cell transplantation (H) 07/23/2024     Priority: Medium     Bacteremia 11/10/2023     Priority: Medium     Abdominal pain, unspecified abdominal location 11/09/2023     Priority: Medium     Cholangitis (H28) 06/07/2023     Priority: Medium     On total parenteral nutrition (TPN) 06/04/2023     Priority: Medium     Fever, unspecified fever cause 06/04/2023     Priority: Medium     Chronic pancreatitis, unspecified pancreatitis type (H) 06/04/2023     Priority: Medium     History of food intolerance 05/09/2023     Priority: Medium     Malnutrition, unspecified type (H24) 05/09/2023     Priority: Medium     Nausea and vomiting, unspecified vomiting type 05/09/2023     Priority: Medium     Major depression, single episode 03/06/2023     Priority: Medium     Acute postoperative abdominal pain 01/31/2023     Priority: Medium     Feeding intolerance 12/30/2022     Priority: Medium     Myofascial pain 10/14/2022     Priority: Medium     Added automatically from request for surgery [id number]       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  pancreas and spleen removed, islet cells transplanted into liver    3 classes of vaccines needed .    1. Pneumococcal vaccines are as follows:       PCV 13 - one time dose (was given 2017)       PPSV23 - (given 12/1/16); repeat q 5 years      ROLE of PCV 20???    2. Meningococcal vaccines     Menactra - (last given 12/1/16) -  repeat q 5 yrs   NOTE: need to wait 4 wks after PCV 13 has been given.   OR - give Menveo instead as it can be given same time  as PCV 13    AND   Bexcero - (got 12/9/16) - does not need to be repeated.     3. The Hib vaccine - (got 12/9/16) - does not need to be repeated.       Poisoning by drug 05/09/2022     Priority: Medium     Formatting of this note might be different from the original.  Per Care Everywhere review:  All oral, IV and injectable steroids are contraindicated (unless in life threatening situations) in Islet Auto transplant recipients. They can cause irreversible loss of islet cell function. Please contact patient's transplant care coordinator ADI Gaffney RN at [phone]/pager [phone] and/or endocrinologist prior to administration.       Type 1 diabetes mellitus without complication (H) 05/09/2022     Priority: Medium     Formatting of this note might be different from the original.  ACTUALLY - DM 3c - pathogenic diabetes as no pancreas.  (pancreas and spleen removed, islet cells transplanted into liver)    Seeing ENDO at [facility] - Dr Erum Melissa       Intra-abdominal abscess (H) 12/03/2021     Priority: Medium     Postprocedural intraabdominal abscess 12/03/2021     Priority: Medium     Gastrostomy tube obstruction (H) 11/27/2021     Priority: Medium     Abdominal pain, generalized 09/18/2021     Priority: Medium     Adult failure to thrive 08/16/2021     Priority: Medium     Added automatically from request for surgery [id number]       Hypoglycemia 08/05/2021     Priority: Medium     Iron deficiency anemia 03/22/2019     Priority: Medium     Headache, chronic migraine without aura 09/18/2018     Priority: Medium     Chronic pain syndrome 09/18/2018     Priority: Medium     S/P hernia repair 08/23/2018     Priority: Medium     Incisional hernia, without obstruction or gangrene 05/20/2018     Priority: Medium     Adhesive capsulitis of shoulder, unspecified laterality 11/14/2017     Priority: Medium     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  Excess IgG4  [facility]       Gastroparesis      Priority: Medium     ACP (advance care planning) 03/28/2017     Priority: Medium     Advance Care Planning 3/28/2017: Receipt of ACP document:  Received: Health Care Directive which was witnessed or notarized on 12/8/16.  Document previously scanned on 1/12/17.  Validation form completed and scanned.  Code Status reflects choices in most recent ACP document..  Confirmed/documented designated decision maker(s).  Added by May Baires   Advance Care Planning Liaison               Pancreatic insufficiency 01/17/2017     Priority: Medium     Diabetes insipidus (H24) 01/05/2017     Priority: Medium     Formatting of this note might be different from the original.  Diabetes Insipidus (DI)  Per external records.  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  Depression NOS       Other type of intractable migraine      Priority: Medium     Diagnosis updated by automated process. Provider to review and confirm.       GERD (gastroesophageal reflux disease) 12/01/2010     Priority: Medium     Lumbago 04/18/2005     Priority: Medium     History of L5-S1 degenerative disk disease.         Sensorineural hearing loss 01/10/2005     Priority: Medium     Problem list name updated by automated process. Provider to review       Vertiginous syndrome and labyrinthine disorder 01/10/2005     Priority: Medium     Problem list name updated by automated process. Provider to review  IMO  Regulatory Load OCT 2020       Sprain and strain of other specified sites of hip and thigh 12/09/2002     Priority: Medium     Chondromalacia of patella 12/09/2002     Priority: Medium     Need for prophylactic immunotherapy      Priority: Medium     trees, grass, srw, dust mites, cat       Allergic rhinitis due to other allergen 12/21/2001     Priority: Medium     Hypothyroidism      Priority: Medium     Problem list name updated by automated process. Provider to review         Medications:  Current Outpatient Medications   Medication Sig Dispense Refill     acetaminophen (TYLENOL) 325 MG/10.15ML solution 20.3 mLs (650 mg) by Per J Tube route every 6 hours as needed for mild pain or fever 473 mL 4     baclofen (LIORESAL) 10 MG tablet 1-2 tablets (10-20 mg) by Per G Tube route 3 times daily 60 tablet 1     buprenorphine (SUBUTEX) 2 MG SUBL sublingual tablet Place 2 mg under the tongue 2 times daily       cetirizine (ZYRTEC) 10 MG tablet 1 tablet (10 mg) by Per G Tube route 2 times daily 60 tablet 11     COMPOUNDED NON-CONTROLLED SUBSTANCE (CMPD RX) - PHARMACY TO MIX COMPOUNDED MEDICATION Administer 60 mg amitriptyline 2 mg/ml via G-J tube at bedtime 900 mL 3     COMPOUNDED NON-CONTROLLED SUBSTANCE (CMPD RX) - PHARMACY TO MIX COMPOUNDED MEDICATION Administer 40 mg amitriptyline suspension (2 mg/mL) via G-J tube at bedtime. 600 mL 5     COMPOUNDED NON-CONTROLLED SUBSTANCE (CMPD RX) - PHARMACY TO MIX COMPOUNDED MEDICATION Place 1 enema rectally 2 times daily as needed (for constipation) 60 each 3     Continuous Blood Gluc Sensor (FREESTYLE LISA 3 SENSOR) MISC CHANGE EVERY 14 DAYS 2 each 11     Digestive Enzyme Cartridge (RELIZORB) MARCO 2 Cartridges daily 60 each 6     diphenhydrAMINE (BENADRYL) 12.5 MG/5ML solution Take 25 mg by mouth 4 times daily as needed for other (nausea)       DULoxetine HCl 60 MG CSDR 1 capsule (60 mg) by Per J Tube route daily Sprinkle caps for feeding tube 90 capsule 3     estradiol (VAGIFEM)  "10 MCG TABS vaginal tablet INSERT 1 TABLET(10 MCG) VAGINALLY 2 TIMES A WEEK 24 tablet 2     famotidine (PEPCID) 40 MG/5ML suspension 2.5 mLs (20 mg) by Per J Tube route daily 50 mL 4     fluconazole (DIFLUCAN) 40 MG/ML suspension Take 5 mLs by mouth every 24 hours       glucagon 1 MG kit Give 0.1 to 0.15mg( 10-15 units on Insulin sryinge) subcutaneous  every 15 minutes PRN for hypoglycemia. Remix new kit q24hr. Needs up to 3 kit/week. 10 each 3     glucose 40 % GEL gel Take 15-30 g by mouth every 15 minutes as needed for low blood sugar 3 Tube 2     ibuprofen (ADVIL/MOTRIN) 400 MG tablet take 30 mls  by oral route  every 4 - 6 hours as needed       insulin aspart (NOVOLOG FLEXPEN) 100 UNIT/ML pen Take 1 unit if BG is 175-275, and 2 units if >275 up to every 4 hours as needed.  \"Prime\" pen needle with 2 unit airshot before giving, needs supply for 15 unit/day. 15 mL 11     insulin aspart (NOVOLOG PEN) 100 UNIT/ML pen Take 1 unit if BG is 175-275, and 2 units if >275 up to every 4 hours as needed.  \"Prime\" pen needle with 2 unit airshot before giving, needs supply for 15 unit/day. 15 mL 11     insulin detemir (LEVEMIR PEN) 100 UNIT/ML pen Take 7 to 10 units per day, adjust as directed by MD.  Do 2 unit 'prime' before dosing, needs 12 units/day supply. 15 mL 5     insulin syringe-needle U-100 (30G X 1/2\" 0.3 ML) 30G X 1/2\" 0.3 ML miscellaneous Use 3 syringes daily or as directed. 100 each 11     lactated ringers infusion Inject 1,000 mLs into the vein daily as needed       levothyroxine (SYNTHROID) 25 mcg/mL SUSP 5.48 mLs (137 mcg) by Per J Tube route daily 500 mL 3     lidocaine (LIDODERM) 5 % patch Place onto the skin every 24 hours To prevent lidocaine toxicity, patient should be patch free for 12 hrs daily.       lipase-protease-amylase (CREON) 09406-25394-061980 units CPEP per EC capsule Take 3-4 capsules by mouth 3 times daily (with meals) With oral meals 150 capsule 11     methocarbamol (ROBAXIN) 750 MG tablet " "Take 1 tablet (750 mg) by mouth 4 times daily as needed for muscle spasms 120 tablet 11     montelukast (SINGULAIR) 10 MG tablet Take by mouth every 24 hours       naloxegol (MOVANTIK) 12.5 MG TABS tablet Take 25 mg by mouth every morning (before breakfast)       Nutritional Supplements (BOOST HIGH PROTEIN) LIQD After above baseline labs are drawn, give: 6 mL/kg to maximum of 360 mL; the beverage is to be consumed within 5 minutes.  0     pantoprazole (PROTONIX) 40 mg IV push injection Inject 40 mg into the vein daily (with breakfast) 30 each 11     parenteral nutrition - PTA/DISCHARGE ORDER The TPN formula will print on the After Visit Summary Report. 1 each 0     prochlorperazine (COMPAZINE) 10 MG tablet Take 1 tablet (10 mg) by mouth every 6 hours as needed for nausea or vomiting 120 tablet 3     prochlorperazine (COMPRO) 25 MG suppository Place 1 suppository (25 mg) rectally every 12 hours as needed for nausea 10 suppository 2     promethazine 25 mg Inject 25 mg into the vein 3 times daily       promethazine-phenylephrine (PROMETHAZINE VC) 6.25-5 MG/5ML syrup Take 5 mLs by mouth every 6 hours       rimegepant (NURTEC) 75 MG ODT tablet Place 1 tablet (75 mg) under the tongue daily as needed for migraine Maximum of 1 tablet every 24 hours. 8 tablet 11     scopolamine (TRANSDERM) 1 MG/3DAYS 72 hr patch Place 1 patch onto the skin every 72 hours - Transdermal 10 patch 11     sennosides (SENOKOT) 8.8 MG/5ML syrup 5 mLs by Per J Tube route 2 times daily 236 mL 11     Sharps Container (BD SHARPS ) MISC 1 Container as needed 1 each 1     silver nitrate (ARZOL SILVER NIT APPLICATORS) 75-25 % miscellaneous Apply topically as needed for wound care Apply Silver Nitrate Sticks every 2-3 days until granulation tissue resolved.  \"Roll\" tip of Silver Nitrate Q tip directly onto the granulation tissue-bulging tissue area.  Have Agency or Home Care Nurse administer this, if you prefer.  Take care not to touch any healthy " tissue or skin.  The tissue will turn white and eventually black & scab off.  May repeat if needed in the future for recurrence. 30 applicator 0     sodium phosphate, mineral oil, magnesium citrate enema Instill 1 pink 187 ml enema twice daily as needed for constipation       STATIN NOT PRESCRIBED (INTENTIONAL) Previous liver issues, risks vs benefits felt to not warrant statin.    Discussed Oct 2022 visit       SYNDROS 5 MG/ML oral soln TAKE 0.5 ML BY MOUTH 2 TIMES DAILY 60 mL 0     zinc oxide - white petrolatum (CRITIC-AID THICK MOIST BARRIER) 20-51% PSTE topical paste Apply 71 g topically every hour as needed for rash (Under J tube bumper when needed for skin protection) 170 g 0     ZOLMitriptan (ZOMIG-ZMT) 5 MG ODT Take 1 tablet (5 mg) by mouth at onset of headache for migraine Dissolve tablet in the mouth. May repeat in 2 hours. Max 2 tablets/24 hours. 12 tablet 6     polyethylene glycol (MIRALAX) 17 GM/Dose powder Please take 238 grams mixed with 64 oz of Gatorade at 4pm the night before surgery 238 g 0       Allergies:  Allergies   Allergen Reactions     Apple Juice Anaphylaxis     Corticosteroids Other (See Comments)     All oral, IV and injectable steroids are contraindicated (unless in life threatening situations) in [facility] transplant recipients. They can cause irreversible loss of islet cell function. Please contact patient's transplant care coordinator ADI Gaffney RN at [phone]/pager [phone] and/or endocrinologist prior to administration.       Depakote [Divalproex Sodium] Other (See Comments)     Chest pain     Zithromax [Azithromycin Dihydrate] Anaphylaxis     Noted in 4/7/08 ER     Bromfenac      Other reaction(s): Headache     Codeine      Other reaction(s): Hallucinations     Darvocet [Propoxyphene N-Apap] Itching     Morphine Nausea and Vomiting and Rash     Nalbuphine Hcl Rash     RASH :nubaine     Zosyn [Piperacillin-Tazobactam In Dex] Rash     Possible allergy, did have a  diffuse rash that seemed drug related but could have also been related to soap in the hospital.      Bactrim [Sulfamethoxazole W-Trimethoprim] Other (See Comments) and Nausea and Vomiting     Severely low liver function.     Statins Other (See Comments)     Previous liver issues, risks vs benefits felt to not warrant statin.  Discussed Oct 2022 visit     Tape [Adhesive Tape] Blisters     Tramadol      Zofran [Ondansetron] Other (See Comments)     migraine     Flagyl [Metronidazole] Hives and Rash       Family history:  Family History   Problem Relation Age of Onset     Lipids Mother      Hypertension Mother      Thyroid Disease Mother      Depression Mother      Angina Mother      GERD Mother      Skin Cancer Mother      Migraines Mother      Autoimmune Disease Mother      Hyperlipidemia Mother      Mental Illness Mother      Cerebrovascular Disease Mother         11/2023     Other Cancer Mother         skin-basil cell     Anxiety Disorder Mother      Eye Disorder Father         cataract, detached retina     Myocardial Infarction Father 60     Lipids Father      Cerebrovascular Disease Father      Depression Father      Substance Abuse Father      Anesthesia Reaction Father         stroke right after surgery     Cataracts Father      Osteoarthritis Father      Ulcerative Colitis Father      Autoimmune Disease Father      Heart Disease Father      Hyperlipidemia Father      Mental Illness Father      Other Cancer Father         myloma     Anxiety Disorder Father      Interpersonal Violence Sister      Coronary Artery Disease Sister         angioplasty     GERD Sister      Substance Abuse Sister      Cerebrovascular Disease Sister         2005     Depression Sister      Thyroid Disease Sister      Eye Disorder Maternal Grandmother         cataract     Thyroid Disease Maternal Grandmother      Diabetes Maternal Grandfather      Eye Disorder Paternal Grandmother         cataract     Diabetes Paternal Grandmother       Eye Disorder Paternal Grandfather         cataract     Diabetes Paternal Grandfather      Substance Abuse Paternal Grandfather      Eye Disorder Son         ptosis     Depression Son      Anxiety Disorder Son      Heart Disease Paternal Aunt      Diabetes Paternal Aunt      Diabetes Paternal Uncle      Heart Disease Paternal Uncle      Depression Nephew      Anxiety Disorder Nephew      Thyroid Disease Nephew      Diabetes Type 2  Cousin         paternal cousin     Autoimmune Disease Cousin      Autoimmune Disease Sister      Depression Sister      Mental Illness Sister      Substance Abuse Sister      Thyroid Disease Sister      Depression Son      Mental Illness Son      Anxiety Disorder Son      Thyroid Disease Nephew      Anxiety Disorder Nephew        Social history:  Social History     Tobacco Use     Smoking status: Former     Current packs/day: 0.00     Average packs/day: 0.5 packs/day for 6.3 years (3.2 ttl pk-yrs)     Types: Cigarettes     Start date: 1985     Quit date: 1992     Years since quittin.6     Smokeless tobacco: Not on file     Tobacco comments:     no 2nd hand   Substance Use Topics     Alcohol use: Not Currently     Alcohol/week: 3.0 - 6.0 standard drinks of alcohol     Types: 1 - 2 Glasses of wine, 1 - 2 Cans of beer, 1 - 2 Shots of liquor per week     Comment: none since IGG    Marital status: .    Nursing Notes:   Herbie Kasper, EMT  2024  3:46 PM  Signed  Chief Complaint   Patient presents with     Follow Up       There were no vitals filed for this visit.    There is no height or weight on file to calculate BMI.    Herbie Kasper EMT-P         Kaylene Branch MD  Colon and Rectal Surgery Staff  [facility]      This note was created using speech recognition software and may contain unintended word substitutions.      Again, thank you for allowing me to participate in the care of your patient.      Sincerely,    Kaylene GALO  MD Sarina

## 2024-08-14 NOTE — PROGRESS NOTES
Clinic Care Coordination Contact    Situation: Patient chart reviewed by care coordinator.    Background: Patient enrolled in Care Coordination.    Assessment: [employer] message sent to patient for follow up.     Plan/Recommendations: RN will await pt's reply for further outreach.    DARIELA ADAMS RN on 8/14/2024 at 1:22 PM

## 2024-08-15 ENCOUNTER — OFFICE VISIT (OUTPATIENT)
Dept: NEUROLOGY | Facility: CLINIC | Age: 59
End: 2024-08-15
Payer: COMMERCIAL

## 2024-08-15 DIAGNOSIS — G43.709 CHRONIC MIGRAINE W/O AURA W/O STATUS MIGRAINOSUS, NOT INTRACTABLE: Primary | ICD-10-CM

## 2024-08-15 PROCEDURE — 64615 CHEMODENERV MUSC MIGRAINE: CPT | Performed by: NURSE PRACTITIONER

## 2024-08-15 ASSESSMENT — ANXIETY QUESTIONNAIRES
3. WORRYING TOO MUCH ABOUT DIFFERENT THINGS: SEVERAL DAYS
IF YOU CHECKED OFF ANY PROBLEMS ON THIS QUESTIONNAIRE, HOW DIFFICULT HAVE THESE PROBLEMS MADE IT FOR YOU TO DO YOUR WORK, TAKE CARE OF THINGS AT HOME, OR GET ALONG WITH OTHER PEOPLE: SOMEWHAT DIFFICULT
5. BEING SO RESTLESS THAT IT IS HARD TO SIT STILL: NOT AT ALL
2. NOT BEING ABLE TO STOP OR CONTROL WORRYING: NOT AT ALL
GAD7 TOTAL SCORE: 1
GAD7 TOTAL SCORE: 1
7. FEELING AFRAID AS IF SOMETHING AWFUL MIGHT HAPPEN: NOT AT ALL
6. BECOMING EASILY ANNOYED OR IRRITABLE: NOT AT ALL
1. FEELING NERVOUS, ANXIOUS, OR ON EDGE: NOT AT ALL
7. FEELING AFRAID AS IF SOMETHING AWFUL MIGHT HAPPEN: NOT AT ALL
4. TROUBLE RELAXING: NOT AT ALL
8. IF YOU CHECKED OFF ANY PROBLEMS, HOW DIFFICULT HAVE THESE MADE IT FOR YOU TO DO YOUR WORK, TAKE CARE OF THINGS AT HOME, OR GET ALONG WITH OTHER PEOPLE?: SOMEWHAT DIFFICULT

## 2024-08-15 ASSESSMENT — PAIN SCALES - PAIN ENJOYMENT GENERAL ACTIVITY SCALE (PEG)
PEG_TOTALSCORE: 6
INTERFERED_GENERAL_ACTIVITY: 9
INTERFERED_GENERAL_ACTIVITY: 9
AVG_PAIN_PASTWEEK: 7
PEG_TOTALSCORE: 6
INTERFERED_ENJOYMENT_LIFE: 2
INTERFERED_ENJOYMENT_LIFE: 2
AVG_PAIN_PASTWEEK: 7

## 2024-08-15 NOTE — PROGRESS NOTES
[facility] Interventional Evaluation    Date of visit: 8/16/2024    Reason for consultation:    Dinora Mcghee is a 59 year old female who is seen in consultation today for evaluation of an interventional strategy for pain management.    PCP is Juan Jose Gomez.    Please see the [facility] health questionnaire which the patient completed and reviewed with me in detail.    Chief Complaint:    Chief Complaint   Patient presents with    Consult       Pain history:  Dinora Mcghee is a 59 year old female with PMH of chronic pancreatitis s/p TPAIT, gastroparesis, constipation, GERD, migraines, T1DM with last A1c 5.4%, MDD and GJ placement originally on September 2021 presenting with a chief pain complaint of pain at her Gtube site.     Onset: Has had a Gtube for years - used for venting. For the last 4 weeks she has had a significant amount of pain around her Gtube site. She was hospitalized for this with an EGD that noted some granulation and raw tissue. GTube replaced about mid July.   Location and Radiation: Hurts at the 1 o'clock position. Does not radiate. Does feel deep long tract of Gtube.  Provoking: Hurts with any movement; throwing; touching the region; eats small amts, no appreciable difference   Palliating: Burned off granuloma with silver nitrate home health nurse helps.   Quality: Stabbing  Severity: 0/10 if perfectly still, 9/10 at it's worst; 6-7/10   Timing: All day - wakes her up  Numbness: In local region - relates to the lidocaine     Patient denies red flag symptoms of corresponding bowel/bladder symptoms, fever/chills, saddle anesthesia, profound motor loss, weight loss, or sudden unremitting increase in pain.    Current pain medications include:    Lidocaine patch at site  Tylenol -   Baclofen 10-20 mg TID -   Subutex 2 mg TID sublingual -   Duloxetine 60 mg daily -   Methocarbamol 750 mg QID prn -   Lidocaine patchs and cream  PRN  Medical cannabis - lozenges   Amitriptyline     Previous medication treatments included:  None    Other treatments have included:  Dinora Mcghee has been seen at a pain clinic in the past.  She was a patient of Dr. Mahajan and last saw her on 4/18/2023 for abdominal pain. She's had TAP blocks before for chronic abdominal pain. She had also followed with [facility], [place] Rogers    PT: None  Injections: Prior TAP blocks by Dr. Mahajan  Surgery: Multiple abdominal surgeries. Gtube replaced July 15, 2024  Alternative: n/a Ice - helpful  Heat   Tape to immobilize     Past Medical History:  Past Medical History:   Diagnosis Date    Allergic rhinitis, cause unspecified     Allergy to other foods     strawberries, apples, celeries, alice, watermelon    Arthritis     left knee    Choledocholithiasis     long after cholecystectomy    Chronic abdominal pain     Chronic constipation     Chronic nausea     Chronic pancreatitis (H)     Degeneration of lumbar or lumbosacral intervertebral disc     Esophageal reflux     w/ hiatal hernia    Gastroparesis     Hiatal hernia     History of pituitary adenoma     s/p resection    Hypothyroidism     Migraines     Mild hyperlipidemia     On tube feeding diet     presence of GJ tube    Pancreatic disease     PD stricture, 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     PONV (postoperative nausea and vomiting)     Portacath in place     Type 1 diabetes mellitus without complication (H) 5/9/2022    Unspecified hearing loss     25% high frequency R     Patient Active Problem List    Diagnosis Date Noted    Constipation 07/23/2024     Priority: Medium    Right upper quadrant abdominal pain 07/23/2024     Priority: Medium    S/P pancreatic islet cell transplantation (H) 07/23/2024     Priority: Medium    Bacteremia 11/10/2023     Priority: Medium    Abdominal pain, unspecified abdominal location 11/09/2023     Priority: Medium    Cholangitis (H28) 06/07/2023     Priority: Medium     On total parenteral nutrition (TPN) 06/04/2023     Priority: Medium    Fever, unspecified fever cause 06/04/2023     Priority: Medium    Chronic pancreatitis, unspecified pancreatitis type (H) 06/04/2023     Priority: Medium    History of food intolerance 05/09/2023     Priority: Medium    Malnutrition, unspecified type (H24) 05/09/2023     Priority: Medium    Nausea and vomiting, unspecified vomiting type 05/09/2023     Priority: Medium    Major depression, single episode 03/06/2023     Priority: Medium    Acute postoperative abdominal pain 01/31/2023     Priority: Medium    Feeding intolerance 12/30/2022     Priority: Medium    Myofascial pain 10/14/2022     Priority: Medium     Added automatically from request for surgery [id number]      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  pancreas and spleen removed, islet cells transplanted into liver    3 classes of vaccines needed .    1. Pneumococcal vaccines are as follows:       PCV 13 - one time dose (was given 2017)       PPSV23 - (given 12/1/16); repeat q 5 years      ROLE of PCV 20???    2. Meningococcal vaccines     Menactra - (last given 12/1/16) -  repeat q 5 yrs   NOTE: need to wait 4 wks after PCV 13 has been given.   OR - give Menveo instead as it can be given same time as PCV 13    AND   Bexcero - (got 12/9/16) - does not need to be repeated.     3. The Hib vaccine - (got 12/9/16) - does not need to be repeated.      Poisoning by drug 05/09/2022     Priority: Medium     Formatting of this note might be different from the original.  Per Care Everywhere review:  All oral, IV and injectable steroids are contraindicated (unless in life threatening situations) in Islet Auto transplant recipients. They can cause irreversible loss of islet cell function. Please contact patient's transplant care coordinator ADI Gaffney RN at [phone]/pager [phone] and/or endocrinologist  prior to administration.      Type 1 diabetes mellitus without complication (H) 05/09/2022     Priority: Medium     Formatting of this note might be different from the original.  ACTUALLY - DM 3c - pathogenic diabetes as no pancreas.  (pancreas and spleen removed, islet cells transplanted into liver)    Seeing ENDO at [facility] - Dr Erum Melissa      Intra-abdominal abscess (H) 12/03/2021     Priority: Medium    Postprocedural intraabdominal abscess 12/03/2021     Priority: Medium    Gastrostomy tube obstruction (H) 11/27/2021     Priority: Medium    Abdominal pain, generalized 09/18/2021     Priority: Medium    Adult failure to thrive 08/16/2021     Priority: Medium     Added automatically from request for surgery [id number]      Hypoglycemia 08/05/2021     Priority: Medium    Iron deficiency anemia 03/22/2019     Priority: Medium    Headache, chronic migraine without aura 09/18/2018     Priority: Medium    Chronic pain syndrome 09/18/2018     Priority: Medium    S/P hernia repair 08/23/2018     Priority: Medium    Incisional hernia, without obstruction or gangrene 05/20/2018     Priority: Medium    Adhesive capsulitis of shoulder, unspecified laterality 11/14/2017     Priority: Medium    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  Excess IgG4  [facility]      Gastroparesis      Priority: Medium    ACP (advance care planning) 03/28/2017     Priority: Medium     Advance Care Planning 3/28/2017: Receipt of ACP document:  Received: Health Care Directive which was witnessed or notarized on 12/8/16.  Document previously scanned on 1/12/17.  Validation form completed and scanned.  Code Status reflects choices in most recent ACP document..  Confirmed/documented designated decision maker(s).  Added by May Baires   Advance Care Planning  Liaison              Pancreatic insufficiency 01/17/2017     Priority: Medium    Diabetes insipidus (H24) 01/05/2017     Priority: Medium     Formatting of this note might be different from the original.  Diabetes Insipidus (DI)  Per external records.  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  Depression NOS      Other type of intractable migraine      Priority: Medium     Diagnosis updated by automated process. Provider to review and confirm.      GERD (gastroesophageal reflux disease) 12/01/2010     Priority: Medium    Lumbago 04/18/2005     Priority: Medium     History of L5-S1 degenerative disk disease.        Sensorineural hearing loss 01/10/2005     Priority: Medium     Problem list name updated by automated process. Provider to review      Vertiginous syndrome and labyrinthine disorder 01/10/2005     Priority: Medium     Problem list name updated by automated process. Provider to review  IMO Regulatory Load OCT 2020      Sprain and strain of other specified sites of hip and thigh 12/09/2002     Priority: Medium    Chondromalacia of patella 12/09/2002     Priority: Medium    Need for prophylactic immunotherapy      Priority: Medium     trees, grass, srw, dust mites, cat      Allergic rhinitis due to other allergen 12/21/2001     Priority: Medium    Hypothyroidism      Priority: Medium     Problem list name updated by automated process. Provider to review         Past Surgical History:  Past Surgical History:   Procedure Laterality Date    ABDOMEN SURGERY  01/01/1999    c sections: 8/23/93, 6/23/96, 4/9/98. endometriosis  growth    APPENDECTOMY       SECTION  1993    COLONOSCOPY  2014    COLONOSCOPY N/A 2023    Procedure: COLONOSCOPY, WITH POLYPECTOMY AND BIOPSY;  Surgeon: Percy Chamorro MD;  [facility]    ENDOSCOPIC INSERTION TUBE GASTROSTOMY N/A 2021    Procedure: Gastrojejonostomy placement with jejunopexy, PEG tube exchange;  Surgeon: Zackery Montoya MD;  [facility]    ENDOSCOPIC RETROGRADE CHOLANGIOPANCREATOGRAM N/A 2015    Procedure: ENDOSCOPIC RETROGRADE CHOLANGIOPANCREATOGRAM;  Surgeon: Mandeep Park MD;  [facility]    ENDOSCOPIC RETROGRADE CHOLANGIOPANCREATOGRAM N/A 2016    Procedure: COMBINED ENDOSCOPIC RETROGRADE CHOLANGIOPANCREATOGRAPHY, PLACE TUBE/STENT;  Surgeon: Mandeep Park MD;  [facility]    ENDOSCOPIC RETROGRADE CHOLANGIOPANCREATOGRAM N/A 2016    Procedure: COMBINED ENDOSCOPIC RETROGRADE CHOLANGIOPANCREATOGRAPHY, REMOVE FOREIGN BODY OR STENT/TUBE;  Surgeon: Mandeep Park MD;  [facility]    ENDOSCOPIC RETROGRADE CHOLANGIOPANCREATOGRAM N/A 2016    Procedure: COMBINED ENDOSCOPIC RETROGRADE CHOLANGIOPANCREATOGRAPHY, PLACE TUBE/STENT;  Surgeon: Mandeep Park MD;  [facility]    ENDOSCOPIC RETROGRADE CHOLANGIOPANCREATOGRAM N/A 2016    Procedure: COMBINED ENDOSCOPIC RETROGRADE CHOLANGIOPANCREATOGRAPHY, REMOVE FOREIGN BODY OR STENT/TUBE;  Surgeon: Mandeep Park MD;  [facility]    ENDOSCOPIC ULTRASOUND UPPER GASTROINTESTINAL TRACT (GI) N/A 10/03/2016    Procedure: ENDOSCOPIC ULTRASOUND, ESOPHAGOSCOPY / UPPER GASTROINTESTINAL TRACT (GI);  Surgeon: Guru Jose Rodas MD;  [facility]    ENT SURGERY  1989    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    ENTEROSCOPY SMALL BOWEL N/A 2022    Procedure: ENTEROSCOPY with possible fistula closure;  Surgeon: Francisco Dodson MD;  Location:  GI    ESOPHAGOSCOPY, GASTROSCOPY, DUODENOSCOPY (EGD), COMBINED N/A  06/24/2015    Procedure: COMBINED ESOPHAGOSCOPY, GASTROSCOPY, DUODENOSCOPY (EGD), REMOVE FOREIGN BODY;  Surgeon: Mandeep Park MD;  [facility]    ESOPHAGOSCOPY, GASTROSCOPY, DUODENOSCOPY (EGD), COMBINED N/A 10/25/2015    Procedure: COMBINED ESOPHAGOSCOPY, GASTROSCOPY, DUODENOSCOPY (EGD);  Surgeon: Sammy Amaro MD;  [facility]    ESOPHAGOSCOPY, GASTROSCOPY, DUODENOSCOPY (EGD), COMBINED N/A 10/25/2015    Procedure: COMBINED ESOPHAGOSCOPY, GASTROSCOPY, DUODENOSCOPY (EGD), BIOPSY SINGLE OR MULTIPLE;  Surgeon: Sammy Amaro MD;  [facility]    ESOPHAGOSCOPY, GASTROSCOPY, DUODENOSCOPY (EGD), COMBINED N/A 01/31/2023    Procedure: ESOPHAGOGASTRODUODENOSCOPY (EGD) with peg replacement ;  Surgeon: Mandeep Park MD;  [facility]    ESOPHAGOSCOPY, GASTROSCOPY, DUODENOSCOPY (EGD), COMBINED N/A 7/24/2024    Procedure: Esophagoscopy, gastroscopy, duodenoscopy (EGD), combined;  Surgeon: Zackery Montoya MD;  [facility]    ESOPHAGOSCOPY, GASTROSCOPY, DUODENOSCOPY (EGD), DILATATION, COMBINED      EXCISE LESION TRUNK N/A 04/17/2017    Procedure: EXCISE LESION TRUNK;  Removal of Abdominal Foreign Body;  Surgeon: Nestor Phoenix MD;  [facility]    HC ESOPH/GAS REFLUX TEST W NASAL IMPED >1 HR N/A 11/19/2015    Procedure: ESOPHAGEAL IMPEDENCE FUNCTION TEST WITH 24 HOUR PH GREATER THAN 1 HOUR;  Surgeon: Thiago Apple MD;  [facility]: [facility]    HC UGI ENDOSCOPY DIAG W BIOPSY  09/17/2008    HC UGI ENDOSCOPY DIAG W BIOPSY  09/27/2012    HC UGI ENDOSCOPY W ESOPHAGEAL DILATION BALLOON <30MM  09/17/2008    HC UGI ENDOSCOPY W EUS N/A 05/05/2015    Procedure: COMBINED ENDOSCOPIC ULTRASOUND, ESOPHAGOSCOPY, GASTROSCOPY, DUODENOSCOPY (EGD);  Surgeon: Wm Dueñas MD;  [facility]    HC WRIST ARTHROSCOP,RELEASE XVERS LIG Bilateral 12/17/2008    INJECT TRANSVERSUS ABDOMINIS PLANE (TAP) BLOCK BILATERAL Left 09/22/2016    Procedure: INJECT TRANSVERSUS ABDOMINIS PLANE  (TAP) BLOCK BILATERAL;  Surgeon: Dickson Corrigan MD;  [facility]    INJECT TRIGGER POINT Bilateral 09/08/2022    Procedure: Abdominal trigger point injection with ultrasound;  Surgeon: Monika Mahajan MD;  [facility]    INJECT TRIGGER POINT SINGLE / MULTIPLE 3 OR MORE MUSCLES Right 11/15/2022    Procedure: Trigger point injections right abdomen with ultrasound guidance;  Surgeon: Monika Mahajan MD;  Location: [facility]    IR CHEST PORT PLACEMENT < 5 YRS OF AGE  06/10/2022    IR CVC TUNNEL PLACEMENT > 5 YRS OF AGE  4/15/2024    IR PORT REMOVAL RIGHT  11/09/2023    laparoscopic pineda  01/01/1995    LAPAROSCOPIC HERNIORRHAPHY INCISIONAL N/A 08/23/2018    Procedure: LAPAROSCOPIC HERNIORRHAPHY INCISIONAL;  Laparoscopic Incisional Hernia Repair with Symbotex Mesh Implant;  Surgeon: Nestor Phoenix MD;  [facility]    LAPAROSCOPIC PANCREATECTOMY, TRANSPLANT AUTO ISLET CELL N/A 12/28/2016    Procedure: LAPAROSCOPIC PANCREATECTOMY, TRANSPLANT AUTO ISLET CELL;  Surgeon: Nestor Phoenix MD;  [facility]    LAPAROTOMY EXPLORATORY N/A 01/31/2023    Procedure: Exploratory Laparotomy, lysis of adhesions, Perforated J-Junostomy Resection, Replace J-Junostomy site;  Surgeon: Elver Bauer MD;  [facility]    LAPAROTOMY, LYSIS ADHESIONS, COMBINED N/A 01/31/2023    Procedure: Dilatation of jejunostomy feeding tube, track with placement of jejunostomy tube with fluoroscopy;  Surgeon: Elver Bauer MD;  [facility]    PICC DOUBLE LUMEN PLACEMENT Left 11/13/2023    Basilic Vein 5F DL 43 cm    REMOVE AND REPLACE BREAST IMPLANT PROSTHESIS N/A 12/30/2022    Procedure: Exploratory Laparotomy, lysis of adhesions, small bowel resection. Placement of gastric jejunostomy for enteral feeding.;  Surgeon: Elver Bauer MD;  [facility]    REMOVE GASTROSTOMY TUBE ADULT N/A 01/28/2022    Procedure: REMOVAL, GASTROSTOMY TUBE, ADULT;  Surgeon: Mandeep Park MD;  [facility]     REPLACE GASTROJEJUNOSTOMY TUBE, PERCUTANEOUS N/A 09/07/2021    Procedure: GASTROJEJUNOSTOMY TUBE PLACEMENT, PERCUTANEOUS, WITH GASTROPEXY;  Surgeon: Mandeep Park MD;  [facility]    REPLACE GASTROJEJUNOSTOMY TUBE, PERCUTANEOUS N/A 09/22/2021    Procedure: REPLACEMENT, GASTROJEJUNOSTOMY TUBE, PERCUTANEOUS;  Surgeon: Zackery Montoya MD;  [facility]    REPLACE GASTROJEJUNOSTOMY TUBE, PERCUTANEOUS N/A 11/22/2022    Procedure: REPLACEMENT, GASTROJEJUNOSTOMY TUBE, PERCUTANEOUS;  Surgeon: Mandeep Park MD;  [facility]    REPLACE GASTROJEJUNOSTOMY TUBE, PERCUTANEOUS N/A 12/09/2022    Procedure: REPLACEMENT, GASTROJEJUNOSTOMY TUBE, PERCUTANEOUS with ENDOSCOPIC SUTURING.;  Surgeon: Mandeep Park MD;  [facility]    REPLACE GASTROJEJUNOSTOMY TUBE, PERCUTANEOUS N/A 08/01/2023    Procedure: Replace Gastrojejunostomy Tube, Percutaneous;  Surgeon: Mandeep Park MD;  [facility]    REPLACE GASTROJEJUNOSTOMY TUBE, PERCUTANEOUS N/A 11/11/2023    Procedure: Replace Gastrojejunostomy Tube, Percutaneous;  Surgeon: Francisco Dodson MD;  [facility]    REPLACE GASTROJEJUNOSTOMY TUBE, PERCUTANEOUS N/A 12/8/2023    Procedure: Replace Gastrojejunostomy Tube, Percutaneous;  Surgeon: Mandeep Park MD;  [facility]    REPLACE JEJUNOSTOMY TUBE, PERCUTANEOUS N/A 09/10/2021    Procedure: UPPER ENDOSCOPY, REPLACEMENT OF PERCUTANEOUS GASTROJEJUNOSTOMY TUBE, T-TAG GASTROPEXY;  Surgeon: Zackery Montoya MD;  [facility]    REPLACE JEJUNOSTOMY TUBE, PERCUTANEOUS N/A 12/29/2021    Procedure: REPLACEMENT, JEJUNOSTOMY TUBE, PERCUTANEOUS;  Surgeon: Mandeep Park MD;  [facility]    REPLACE JEJUNOSTOMY TUBE, PERCUTANEOUS N/A 05/04/2023    Procedure: REPLACEMENT, JEJUNOSTOMY TUBE, PERCUTANEOUS;  Surgeon: Mandeep Park MD;  [facility]    REPLACE JEJUNOSTOMY TUBE, PERCUTANEOUS  08/01/2023    Procedure: Replace Jejunostomy Tube, Percutaneous;   Surgeon: Mandeep Park MD;  [facility]    REPLACE JEJUNOSTOMY TUBE, PERCUTANEOUS N/A 2024    Procedure: REPLACEMENT, JEJUNOSTOMY TUBE, PERCUTANEOUS;  Surgeon: Francisco Dodson MD;  [facility]    transphenoidal pituitary resection  1990    [facility]  DELIVERY ONLY  1996    [facility]  DELIVERY ONLY  1998    repeat c section with incidental cystotomy with repair    [facility] EXCIS PITUITARY,TRANSNASAL/SEPTAL  1980    pituitary tumor removed for diabetes insipidus    [facility] TOTAL ABDOM HYSTERECTOMY  1999    w/ bilateral salpingoophorectomy      Medications:  Current Outpatient Medications   Medication Sig Dispense Refill    acetaminophen (TYLENOL) 325 MG/10.15ML solution 20.3 mLs (650 mg) by Per J Tube route every 6 hours as needed for mild pain or fever 473 mL 4    baclofen (LIORESAL) 10 MG tablet 1-2 tablets (10-20 mg) by Per G Tube route 3 times daily 60 tablet 1    buprenorphine (SUBUTEX) 2 MG SUBL sublingual tablet Place 2 mg under the tongue 2 times daily      cetirizine (ZYRTEC) 10 MG tablet 1 tablet (10 mg) by Per G Tube route 2 times daily 60 tablet 11    COMPOUNDED NON-CONTROLLED SUBSTANCE (CMPD RX) - PHARMACY TO MIX COMPOUNDED MEDICATION Administer 60 mg amitriptyline 2 mg/ml via G-J tube at bedtime 900 mL 3    COMPOUNDED NON-CONTROLLED SUBSTANCE (CMPD RX) - PHARMACY TO MIX COMPOUNDED MEDICATION Administer 40 mg amitriptyline suspension (2 mg/mL) via G-J tube at bedtime. 600 mL 5    COMPOUNDED NON-CONTROLLED SUBSTANCE (CMPD RX) - PHARMACY TO MIX COMPOUNDED MEDICATION Place 1 enema rectally 2 times daily as needed (for constipation) 60 each 3    Continuous Blood Gluc Sensor (FREESTYLE LISA 3 SENSOR) MISC CHANGE EVERY 14 DAYS 2 each 11    Digestive Enzyme Cartridge (RELIZORB) MARCO 2 Cartridges daily 60 each 6    diphenhydrAMINE (BENADRYL) 12.5 MG/5ML solution Take 25 mg by mouth 4 times daily as needed for other (nausea)      DULoxetine HCl 60 MG CSDR 1 capsule  "(60 mg) by Per J Tube route daily Sprinkle caps for feeding tube 90 capsule 3    estradiol (VAGIFEM) 10 MCG TABS vaginal tablet INSERT 1 TABLET(10 MCG) VAGINALLY 2 TIMES A WEEK 24 tablet 2    famotidine (PEPCID) 40 MG/5ML suspension 2.5 mLs (20 mg) by Per J Tube route daily 50 mL 4    fluconazole (DIFLUCAN) 40 MG/ML suspension Take 5 mLs by mouth every 24 hours      glucagon 1 MG kit Give 0.1 to 0.15mg( 10-15 units on Insulin sryinge) subcutaneous  every 15 minutes PRN for hypoglycemia. Remix new kit q24hr. Needs up to 3 kit/week. 10 each 3    glucose 40 % GEL gel Take 15-30 g by mouth every 15 minutes as needed for low blood sugar 3 Tube 2    ibuprofen (ADVIL/MOTRIN) 400 MG tablet take 30 mls  by oral route  every 4 - 6 hours as needed      insulin aspart (NOVOLOG FLEXPEN) 100 UNIT/ML pen Take 1 unit if BG is 175-275, and 2 units if >275 up to every 4 hours as needed.  \"Prime\" pen needle with 2 unit airshot before giving, needs supply for 15 unit/day. 15 mL 11    insulin aspart (NOVOLOG PEN) 100 UNIT/ML pen Take 1 unit if BG is 175-275, and 2 units if >275 up to every 4 hours as needed.  \"Prime\" pen needle with 2 unit airshot before giving, needs supply for 15 unit/day. 15 mL 11    insulin detemir (LEVEMIR PEN) 100 UNIT/ML pen Take 7 to 10 units per day, adjust as directed by MD.  Do 2 unit 'prime' before dosing, needs 12 units/day supply. 15 mL 5    insulin syringe-needle U-100 (30G X 1/2\" 0.3 ML) 30G X 1/2\" 0.3 ML miscellaneous Use 3 syringes daily or as directed. 100 each 11    lactated ringers infusion Inject 1,000 mLs into the vein daily as needed      levothyroxine (SYNTHROID) 25 mcg/mL SUSP 5.48 mLs (137 mcg) by Per J Tube route daily 500 mL 3    lidocaine (LIDODERM) 5 % patch Place onto the skin every 24 hours To prevent lidocaine toxicity, patient should be patch free for 12 hrs daily.      lipase-protease-amylase (CREON) 57829-28576-433416 units CPEP per EC capsule Take 3-4 capsules by mouth 3 times daily " (with meals) With oral meals 150 capsule 11    methocarbamol (ROBAXIN) 750 MG tablet Take 1 tablet (750 mg) by mouth 4 times daily as needed for muscle spasms 120 tablet 11    montelukast (SINGULAIR) 10 MG tablet Take by mouth every 24 hours      naloxegol (MOVANTIK) 12.5 MG TABS tablet Take 25 mg by mouth every morning (before breakfast)      Nutritional Supplements (BOOST HIGH PROTEIN) LIQD After above baseline labs are drawn, give: 6 mL/kg to maximum of 360 mL; the beverage is to be consumed within 5 minutes.  0    pantoprazole (PROTONIX) 40 mg IV push injection Inject 40 mg into the vein daily (with breakfast) 30 each 11    parenteral nutrition - PTA/DISCHARGE ORDER The TPN formula will print on the After Visit Summary Report. 1 each 0    polyethylene glycol (MIRALAX) 17 GM/Dose powder Please take 238 grams mixed with 64 oz of Gatorade at 4pm the night before surgery 238 g 0    prochlorperazine (COMPAZINE) 10 MG tablet Take 1 tablet (10 mg) by mouth every 6 hours as needed for nausea or vomiting 120 tablet 3    prochlorperazine (COMPRO) 25 MG suppository Place 1 suppository (25 mg) rectally every 12 hours as needed for nausea 10 suppository 2    promethazine 25 mg Inject 25 mg into the vein 3 times daily      promethazine-phenylephrine (PROMETHAZINE VC) 6.25-5 MG/5ML syrup Take 5 mLs by mouth every 6 hours      rimegepant (NURTEC) 75 MG ODT tablet Place 1 tablet (75 mg) under the tongue daily as needed for migraine Maximum of 1 tablet every 24 hours. 8 tablet 11    scopolamine (TRANSDERM) 1 MG/3DAYS 72 hr patch Place 1 patch onto the skin every 72 hours - Transdermal 10 patch 11    sennosides (SENOKOT) 8.8 MG/5ML syrup 5 mLs by Per J Tube route 2 times daily 236 mL 11    Sharps Container (BD SHARPS ) MISC 1 Container as needed 1 each 1    silver nitrate (ARZOL SILVER NIT APPLICATORS) 75-25 % miscellaneous Apply topically as needed for wound care Apply Silver Nitrate Sticks every 2-3 days until  "granulation tissue resolved.  \"Roll\" tip of Silver Nitrate Q tip directly onto the granulation tissue-bulging tissue area.  Have Agency or Home Care Nurse administer this, if you prefer.  Take care not to touch any healthy tissue or skin.  The tissue will turn white and eventually black & scab off.  May repeat if needed in the future for recurrence. 30 applicator 0    sodium phosphate, mineral oil, magnesium citrate enema Instill 1 pink 187 ml enema twice daily as needed for constipation      STATIN NOT PRESCRIBED (INTENTIONAL) Previous liver issues, risks vs benefits felt to not warrant statin.    Discussed Oct 2022 visit      SYNDROS 5 MG/ML oral soln TAKE 0.5 ML BY MOUTH 2 TIMES DAILY 60 mL 0    zinc oxide - white petrolatum (CRITIC-AID THICK MOIST BARRIER) 20-51% PSTE topical paste Apply 71 g topically every hour as needed for rash (Under J tube bumper when needed for skin protection) 170 g 0    ZOLMitriptan (ZOMIG-ZMT) 5 MG ODT Take 1 tablet (5 mg) by mouth at onset of headache for migraine Dissolve tablet in the mouth. May repeat in 2 hours. Max 2 tablets/24 hours. 12 tablet 6     Allergies:     Allergies   Allergen Reactions    Apple Juice Anaphylaxis    Corticosteroids Other (See Comments)     All oral, IV and injectable steroids are contraindicated (unless in life threatening situations) in [facility] transplant recipients. They can cause irreversible loss of islet cell function. Please contact patient's transplant care coordinator ADI Gaffney RN at [phone]/pager [phone] and/or endocrinologist prior to administration.      Depakote [Divalproex Sodium] Other (See Comments)     Chest pain    Zithromax [Azithromycin Dihydrate] Anaphylaxis     Noted in 4/7/08 ER    Bromfenac      Other reaction(s): Headache    Codeine      Other reaction(s): Hallucinations    Darvocet [Propoxyphene N-Apap] Itching    Morphine Nausea and Vomiting and Rash    Nalbuphine Hcl Rash     RASH :nubaine    Zosyn " [Piperacillin-Tazobactam In Dex] Rash     Possible allergy, did have a diffuse rash that seemed drug related but could have also been related to soap in the hospital.     Bactrim [Sulfamethoxazole W-Trimethoprim] Other (See Comments) and Nausea and Vomiting     Severely low liver function.    Statins Other (See Comments)     Previous liver issues, risks vs benefits felt to not warrant statin.  Discussed Oct 2022 visit    Tape [Adhesive Tape] Blisters    Tramadol     Zofran [Ondansetron] Other (See Comments)     migraine    Flagyl [Metronidazole] Hives and Rash         Family history:  Family History   Problem Relation Age of Onset    Lipids Mother     Hypertension Mother     Thyroid Disease Mother     Depression Mother     Angina Mother     GERD Mother     Skin Cancer Mother     Migraines Mother     Autoimmune Disease Mother     Hyperlipidemia Mother     Mental Illness Mother     Cerebrovascular Disease Mother         11/2023    Other Cancer Mother         skin-basil cell    Anxiety Disorder Mother     Eye Disorder Father         cataract, detached retina    Myocardial Infarction Father 60    Lipids Father     Cerebrovascular Disease Father     Depression Father     Substance Abuse Father     Anesthesia Reaction Father         stroke right after surgery    Cataracts Father     Osteoarthritis Father     Ulcerative Colitis Father     Autoimmune Disease Father     Heart Disease Father     Hyperlipidemia Father     Mental Illness Father     Other Cancer Father         myloma    Anxiety Disorder Father     Interpersonal Violence Sister     Coronary Artery Disease Sister         angioplasty    GERD Sister     Substance Abuse Sister     Cerebrovascular Disease Sister         2005    Depression Sister     Thyroid Disease Sister     Eye Disorder Maternal Grandmother         cataract    Thyroid Disease Maternal Grandmother     Diabetes Maternal Grandfather     Eye Disorder Paternal Grandmother         cataract    Diabetes  Paternal Grandmother     Eye Disorder Paternal Grandfather         cataract    Diabetes Paternal Grandfather     Substance Abuse Paternal Grandfather     Eye Disorder Son         ptosis    Depression Son     Anxiety Disorder Son     Heart Disease Paternal Aunt     Diabetes Paternal Aunt     Diabetes Paternal Uncle     Heart Disease Paternal Uncle     Depression Nephew     Anxiety Disorder Nephew     Thyroid Disease Nephew     Diabetes Type 2  Cousin         paternal cousin    Autoimmune Disease Cousin     Autoimmune Disease Sister     Depression Sister     Mental Illness Sister     Substance Abuse Sister     Thyroid Disease Sister     Depression Son     Mental Illness Son     Anxiety Disorder Son     Thyroid Disease Nephew     Anxiety Disorder Nephew        Physical Exam:  Vitals:    08/16/24 1314   BP: 121/72   BP Location: Right arm   Patient Position: Sitting   Cuff Size: Adult Regular   Pulse: 110   SpO2: 98%     Exam:  Constitutional: Well developed, NAD  Head: normocephalic. Atraumatic.   Eyes: no redness or jaundice noted   CV: warm, well perfused extremities   Skin: no suspicious lesions or rashes   Psychiatric: mentation appears normal and affect full    Abdomen:  Gtube site is tender to light touch of the 1-3 oclock positions, associated with skin appearing red and raw. Positive carnetts with crunch and active straight leg raise; light touch and deep pressure painful at 1 o'clock site. Painful with Gtube movement between 12-3 oclock positions.    Diagnostic tests:    CT AP 7/23/2024  FINDINGS:     Lower thorax: Heart size is normal without pericardial effusion. Trace  basilar atelectasis. Calcified left lower lobe granuloma.     Liver: No mass. No intrahepatic biliary ductal dilation.     Biliary System: Cholecystectomy. No extrahepatic biliary ductal  dilation.     Pancreas: Surgically absent.     Adrenal glands: No mass or nodules     Spleen: Surgically absent.     Kidneys: No suspicious mass,  "obstructing calculus or hydronephrosis.     Gastrointestinal tract : Percutaneous gastrostomy tube is located  within the gastric lumen with no focal abnormality the gastrostomy  tract. Percutaneous jejunostomy in the left midabdomen with tip in the  jejunum. No abnormality of the jejunostomy tract. A few prominent  loops of small bowel at the small bowel anastomosis, similar compared  to prior. Small bowel is otherwise within normal limits. Large stool  burden within the colon with gaseous distention of the rectum.  Appendectomy.     Mesentery/peritoneum/retroperitoneum: No mass. No free fluid or air.     Lymph nodes: No significant lymphadenopathy.     Vasculature: Patent major abdominal vasculature.     Pelvis: Urinary bladder is distended.  Prior hysterectomy.     Osseous structures: No aggressive or acute osseous lesion.  L2  vertebral body hemangioma, unchanged.      Soft tissues: Within normal limits.                                                                      IMPRESSION:   1. Normal appearance of the percutaneous gastrostomy and percutaneous  jejunostomy with appropriate positioning.  2. Large colonic stool burden, which may indicate constipation.    Personally reviewed imaging: yes    Other testing (labs, diagnostics) reviewed:  A1c 5.4  Hgb 11.9      Assessment:  Gtube site pain   Chronic abdominal pain  Corticosteroid contraindicated  \"All oral, IV and injectable steroids are contraindicated (unless in life threatening situations) in [facility] transplant recipients. They can cause irreversible loss of islet cell function. Please contact patient's transplant care coordinator ADI Gaffney RN at [phone]/pager [phone] and/or endocrinologist prior to administration.\"    Dinora Mcghee is a 59 year old female with PMH of chronic pancreatitis s/p TPAIT, gastroparesis, constipation, GERD, migraines, T1DM with last A1c 5.4%, MDD and GJ placement originally on September 2021 presenting with a " chief pain complaint of pain at her Gtube site. Pain may be secondary to irritation and rawness of chronic rubbing of the G tube on the skin and underlying tissues. We will proceed with a TAP block without steroid to attempt to help with this pain. It appears healing may require removal, but patient will need to follow-up with GI for an appropriate discussion on this, including risks/benefits.    Plan:  Diagnosis reviewed, treatment option addressed, and risk/benefits discussed.     Diagnosis reviewed, treatment option addressed, and risk/benefits discussed.   Procedures: left subcostal TAP block - medial to lateral approach to address the 1-3 o'clock position  Physical Therapy: None at this time.   Diagnostic Studies: CT AP reviewed. No new imaging at this time.   Medication Management: No new medications at this time. Continue following with [facility].   Follow up: For procedure    Patient staffed with attending physician, Dr. Anderson.     Adan Gomez DO  PM&R Resident  [facility]    Physician Attestation   I, Geremias Anderson MD, saw and evaluated this patient and agree with the findings and plan of care as documented in the note.  Any changes have been made above.    Geremias Anderson MD  Interventional Pain Medicine  [facility]

## 2024-08-15 NOTE — LETTER
"8/15/2024       RE: Dinora Mcghee  [address] [facility] [address]     Dear Colleague,    Thank you for referring your patient, Dinora Mcghee, to the [facility] [place] at [facility]. Please see a copy of my visit note below.    PROCEDURE NOTE:     [facility]  Botulinum Toxin Procedure     Headache Neurology        08/15/24       Procedure:  OnabotulinumtoxinA injections for chronic migraine  Indication:  Chronic migraine     Dinora suffers from severe intractable headaches.  She was referred by for onabotulinumtoxinA injections for headache.  Risks, benefits, and alternatives were discussed.  All questions were answered and consent given.  She decided to proceed with the injections.      Headache history, from initial consult note: See Initial Headache Clinic visit on 8/3/2022 for details     Prior to initiation of botulinum toxin injections, Ms. Mcghee reported 13-15 headache days per month, with 3-4 severe headache days per month. Her headaches are quite disabling and often interfere with her ability to function normally.     Date of last injections: 05/23/24     Ms. Mcghee reports 3 migraine headaches in the last month before Botox and prior to 1-2 migraines and rescue works.  She has noticed a wearing off phenomenon prior to this round of botulinum toxin injections, lasting 2-3 weeks.     Botulinum toxin injections have improved their functioning: able to read better, walking and not in bed      She has attempted other migraine prophylactic treatments in the past, which have included: Amitriptyline for 15 years and stopped   Topiramate  Sumatriptan   lyrica-\"brain fog\"  depakote -allergies  Compazine +benedryl  Promethazine IV three times per week for   scopalamine patch     She currently takes amitriptyline, promethazine, zolmitriptine for headache prevention.     Ms. Mcghee's pain was assessed prior to the procedure.  " She rated her pain today as 1-2 out of 10.     Procedural Pause: Procedural pause was conducted to verify correct patient identity, procedure to be performed, correct side and site, correct patient position, and special requirements. Appropriate hand hygiene was utilized, and each injection site was prepped with alcohol wipe or Chloraprep swab.      Procedure Details: 200 units of onabotulinumtoxinA was diluted in 4 mL 0.9% normal saline. A total of 155 units of onabotulinumtoxinA were injected using 30 gauge 0.5 in needles into the muscles listed below. 45 units of onabotulinumtoxinA were wasted.         GOAL OF PROCEDURE:  The goal of this procedure is to decrease pain and enhance functional independence.     CONSENT:  The risks, benefits, and treatment options were discussed with the patient who agreed to proceed.     Written consent was obtained      EQUIPMENT USED:  Needles-30 gauge, 0.5 inches for injections  Four 1-ml tuberculin syringes for injections  One sodium chloride 10 ml vial preservative free  Alcohol swabs     SKIN PREPARATION:  Skin preparation was performed using an alcohol wipe.        AREA/MUSCLE INJECTED:  155 units of Botox  Right upper Trapezius (upper cervical) - 5 units of Botox at 3 site/s.   Left upper Trapezius (upper cervical) - 5 units of Botox at 3 site/s.      Right cervical paraspinals - 5 units of Botox at 2 site/s.   Left cervical paraspinals - 5 units of Botox at 2 site/s.      Left occipitalis - 5 units of Botox at 3 site/s.  Right occipitalis -5 units of Botox at 3 site/s     Right Frontalis - 5 units of Botox at 2 site/s.  Left Frontalis - 5 units of Botox at 2 site/s.     Right Temporalis - 5 units of Botox at 4 site/s.  Left Temporalis - 5 units of Botox at 4 site/s.     Right  - 5 units of Botox at 1 site/s.              Left  - 5 units of Botox at 1 site/s.     Procerus - 5 units of Botox at 1 site/s.     RESPONSE TO PROCEDURE:  tolerated the procedure  well and there were no immediate complications.  Patient was allowed to recover for an appropriate period of time and was discharged home in stable condition.     Left PICC -patient asked to take a look at her PICC site. Painful when bumps into something but otherwise no pain, no erythema, edema. 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 She will observed and goes to ED if any problems      FOLLOW UP:     Repeat Botox injections in 12 weeks        This procedure was performed under a hospital privileging agreement with NATALY Bonner, [facility]          Again, thank you for allowing me to participate in the care of your patient.      Sincerely,    NATALY Hoffman CNP

## 2024-08-15 NOTE — PROGRESS NOTES
Clinic Care Coordination Contact  Follow Up Progress Note      Assessment: RN communicated with patient via [employer] Pt works with Dr. Xavier Zambrano's RN for CC regularly. RN offered additional CC support- pt shares that she will reach out with needs for pre-op appointment after meeting with Dr. Branch. RN will await this from patient for further outreach.     Care Gaps:    Health Maintenance Due   Topic Date Due    MIGRAINE ACTION PLAN  Never done    COVID-19 Vaccine (7 - 2023-24 season) 01/24/2024    MAMMO SCREENING  06/02/2024    COLORECTAL CANCER SCREENING  06/09/2024       Currently there are no Care Gaps.    Care Plans  Care Plan: Health Maintenance       Problem: Health Maintenance Due or Overdue       Long-Range Goal: Become up-to-date with health maintenance visit(s)       Start Date: 12/5/2023    This Visit's Progress: On track Recent Progress: On track    Priority: High    Note:     Barriers: patient has complex illness and medical care, collaborates with various specialty teams  Strengths: patient has a great relationship with providers and members of care team, keeps healthcare management organized  Patient expressed understanding of goal: yes  Action steps to achieve this goal:  1. I will attend all of my upcoming appointments with my specialist and primary care provider.   2. I will communicate with my RN CC if there are further needs I have for assistance and support with primary care.   3. I will work with my RN CC on identifying all of the goals my providers have for helping me reach my health care needs.     RN goals:  RN to look at pt's care plans from previous visit notes with providers. RN to help patient identify the items that have yet to be completed and stay on top of health care goals.   RN to collaborate with [facility] RN CC for transplant and GI.                                   Intervention/Education provided during outreach: Discussed patients plan of care. CC RN asked  open ended questions, provided support, resources, and encouragement as needed. Patient will reach out to care team sooner than planned with new questions or concerns.      Plan:     Care Coordinator will follow up in 1 month.     DARIELA ADAMS RN on 8/15/2024 at 11:22 AM

## 2024-08-15 NOTE — PROGRESS NOTES
"PROCEDURE NOTE:     [facility]  Botulinum Toxin Procedure     Headache Neurology        08/15/24       Procedure:  OnabotulinumtoxinA injections for chronic migraine  Indication:  Chronic migraine     Dinora suffers from severe intractable headaches.  She was referred by for onabotulinumtoxinA injections for headache.  Risks, benefits, and alternatives were discussed.  All questions were answered and consent given.  She decided to proceed with the injections.      Headache history, from initial consult note: See Initial Headache Clinic visit on 8/3/2022 for details     Prior to initiation of botulinum toxin injections, Ms. Mcghee reported 13-15 headache days per month, with 3-4 severe headache days per month. Her headaches are quite disabling and often interfere with her ability to function normally.     Date of last injections: 05/23/24     Ms. Mcghee reports 3 migraine headaches in the last month before Botox and prior to 1-2 migraines and rescue works.  She has noticed a wearing off phenomenon prior to this round of botulinum toxin injections, lasting 2-3 weeks.     Botulinum toxin injections have improved their functioning: able to read better, walking and not in bed      She has attempted other migraine prophylactic treatments in the past, which have included: Amitriptyline for 15 years and stopped   Topiramate  Sumatriptan   lyrica-\"brain fog\"  depakote -allergies  Compazine +benedryl  Promethazine IV three times per week for   scopalamine patch     She currently takes amitriptyline, promethazine, zolmitriptine for headache prevention.     Ms. Skinners pain was assessed prior to the procedure.  She rated her pain today as 1-2 out of 10.     Procedural Pause: Procedural pause was conducted to verify correct patient identity, procedure to be performed, correct side and site, correct patient position, and special requirements. Appropriate hand hygiene was utilized, and each injection site was prepped with " alcohol wipe or Chloraprep swab.      Procedure Details: 200 units of onabotulinumtoxinA was diluted in 4 mL 0.9% normal saline. A total of 155 units of onabotulinumtoxinA were injected using 30 gauge 0.5 in needles into the muscles listed below. 45 units of onabotulinumtoxinA were wasted.         GOAL OF PROCEDURE:  The goal of this procedure is to decrease pain and enhance functional independence.     CONSENT:  The risks, benefits, and treatment options were discussed with the patient who agreed to proceed.     Written consent was obtained      EQUIPMENT USED:  Needles-30 gauge, 0.5 inches for injections  Four 1-ml tuberculin syringes for injections  One sodium chloride 10 ml vial preservative free  Alcohol swabs     SKIN PREPARATION:  Skin preparation was performed using an alcohol wipe.        AREA/MUSCLE INJECTED:  155 units of Botox  Right upper Trapezius (upper cervical) - 5 units of Botox at 3 site/s.   Left upper Trapezius (upper cervical) - 5 units of Botox at 3 site/s.      Right cervical paraspinals - 5 units of Botox at 2 site/s.   Left cervical paraspinals - 5 units of Botox at 2 site/s.      Left occipitalis - 5 units of Botox at 3 site/s.  Right occipitalis -5 units of Botox at 3 site/s     Right Frontalis - 5 units of Botox at 2 site/s.  Left Frontalis - 5 units of Botox at 2 site/s.     Right Temporalis - 5 units of Botox at 4 site/s.  Left Temporalis - 5 units of Botox at 4 site/s.     Right  - 5 units of Botox at 1 site/s.              Left  - 5 units of Botox at 1 site/s.     Procerus - 5 units of Botox at 1 site/s.     RESPONSE TO PROCEDURE:  tolerated the procedure well and there were no immediate complications.  Patient was allowed to recover for an appropriate period of time and was discharged home in stable condition.     Left PICC -patient asked to take a look at her PICC site. Painful when bumps into something but otherwise no pain, no erythema, edema. Patient reports  she has a blood return and able to deliver meds without any resistance or pain. She will observed and goes to ED if any problems      FOLLOW UP:     Repeat Botox injections in 12 weeks        This procedure was performed under a hospital privileging agreement with NATALY Bonner, [facility]

## 2024-08-16 ENCOUNTER — TELEPHONE (OUTPATIENT)
Dept: PALLIATIVE MEDICINE | Facility: OTHER | Age: 59
End: 2024-08-16

## 2024-08-16 ENCOUNTER — PREP FOR PROCEDURE (OUTPATIENT)
Dept: PALLIATIVE MEDICINE | Facility: OTHER | Age: 59
End: 2024-08-16

## 2024-08-16 ENCOUNTER — OFFICE VISIT (OUTPATIENT)
Dept: ANESTHESIOLOGY | Facility: CLINIC | Age: 59
End: 2024-08-16
Attending: INTERNAL MEDICINE
Payer: COMMERCIAL

## 2024-08-16 VITALS — SYSTOLIC BLOOD PRESSURE: 121 MMHG | HEART RATE: 110 BPM | DIASTOLIC BLOOD PRESSURE: 72 MMHG | OXYGEN SATURATION: 98 %

## 2024-08-16 DIAGNOSIS — R10.9 CHRONIC ABDOMINAL PAIN: Primary | ICD-10-CM

## 2024-08-16 DIAGNOSIS — M79.18 MYOFASCIAL PAIN: ICD-10-CM

## 2024-08-16 DIAGNOSIS — G89.29 CHRONIC ABDOMINAL PAIN: Primary | ICD-10-CM

## 2024-08-16 DIAGNOSIS — R10.84 ABDOMINAL PAIN, GENERALIZED: ICD-10-CM

## 2024-08-16 DIAGNOSIS — Z46.59 ENCOUNTER FOR CARE RELATED TO FEEDING TUBE: ICD-10-CM

## 2024-08-16 PROCEDURE — 99204 OFFICE O/P NEW MOD 45 MIN: CPT | Mod: GC | Performed by: STUDENT IN AN ORGANIZED HEALTH CARE EDUCATION/TRAINING PROGRAM

## 2024-08-16 ASSESSMENT — PAIN SCALES - GENERAL: PAINLEVEL: SEVERE PAIN (6)

## 2024-08-16 NOTE — PATIENT INSTRUCTIONS
Dinora, it was nice to meet you today. Below is the plan we discussed.     Diagnosis reviewed, treatment option addressed, and risk/benefits discussed.   Procedures: TAP block. This can be done with Dr. Anderson, Dr. Arteaga, Dr. Hamm or Dr. Bales.   Physical Therapy: None at this time.   Diagnostic Studies: CT AP reviewed. No new imaging at this time.   Medication Management: You may try topical diclofenac (Voltaren Gel). You can place this over the site of your pain. Continue following with [facility].   Follow up: Procedure suite.     Procedure Information:     Please call [phone] option #2 to schedule, reschedule, or cancel your procedure appointment.   Phones are answered Monday - Friday from 08:00 - 4:30pm.  Leave a voicemail with your name, birth date, and phone number if no one is available to take your call.        You no longer need to test for COVID- 19 prior to your procedure/surgery, unless your physician specifically requests that you test. If you experience COVID symptoms or have tested positive for COVID-19 within 14 days of your scheduled surgery or procedure, please update our office right away and your procedure may have to be postponed.       The procedure center staff will call you several days before the procedure to review important information that you will need to know for the day of the procedure.     Please contact the clinic if you have further questions about this information [phone].        Information related to Scheduling and Pre-Procedure Instructions:    If you must reschedule your procedure more than two times, you must follow up in clinic before rescheduling again.    Preparing for your procedure    CAUTION - FAILURE TO FOLLOW THESE PRE-PROCEDURE INSTRUCTIONS WILL RESULT IN YOUR PROCEDURE BEING RESCHEDULED.    Your Procedure: TAP block        You must have a  take you home after your procedure. Transportation by taxi or para-transit is okay as  long as you have a responsible adult accompany you. You must provide your 's full name and contact number at time of check in.     Fasting Protocol Please have nothing to eat or drink 2 hour prior to arrival.     Medications If you take any medications, DO NOT STOP. Take your medications as usual the day of your procedure with a sip of water AT LEAST 2 HOURS PRIOR TO ARRIVAL.    Antibiotics If you are currently taking antibiotics, you must complete the entire dose 7 days prior to your scheduled procedure. You must be clear of any signs or symptoms of infection. If you begin antibiotics, please contact our clinic for instructions.     Fever, Chills, or Rash If you experience a fever of higher than 100 degrees, chills, rash, or open wounds during the one week before your procedure, please call the clinic to see if you may proceed with your procedure.      To speak with a nurse, schedule/reschedule/cancel a clinic appointment, or request a medication refill call: [phone]    You can also reach us by [employer]: [email or web address]

## 2024-08-16 NOTE — TELEPHONE ENCOUNTER
Phoned the patient and left VM. Stated to call and schedule injection procedure with dr. Anderson at [phone].

## 2024-08-16 NOTE — LETTER
8/16/2024       RE: Dinora Mcghee  [address] [facility] [address]     Dear Colleague,    Thank you for referring your patient, Dinora Mcghee, to the [facility]IVE [facility]NAGEMENT [place] at [facility]. Please see a copy of my visit note below.                          [facility] Interventional Evaluation    Date of visit: 8/16/2024    Reason for consultation:    Dinora Mcghee is a 59 year old female who is seen in consultation today for evaluation of an interventional strategy for pain management.    PCP is Juan Jose Gomez.    Please see the [facility] health questionnaire which the patient completed and reviewed with me in detail.    Chief Complaint:    Chief Complaint   Patient presents with     Consult       Pain history:  Dinora Mcghee is a 59 year old female with PMH of chronic pancreatitis s/p TPAIT, gastroparesis, constipation, GERD, migraines, T1DM with last A1c 5.4%, MDD and GJ placement originally on September 2021 presenting with a chief pain complaint of pain at her Gtube site.     Onset: Has had a Gtube for years - used for venting. For the last 4 weeks she has had a significant amount of pain around her Gtube site. She was hospitalized for this with an EGD that noted some granulation and raw tissue. GTube replaced about mid July.   Location and Radiation: Hurts at the 1 o'clock position. Does not radiate. Does feel deep long tract of Gtube.  Provoking: Hurts with any movement; throwing; touching the region; eats small amts, no appreciable difference   Palliating: Burned off granuloma with silver nitrate home health nurse helps.   Quality: Stabbing  Severity: 0/10 if perfectly still, 9/10 at it's worst; 6-7/10   Timing: All day - wakes her up  Numbness: In local region - relates to the lidocaine     Patient denies red flag symptoms of corresponding bowel/bladder  symptoms, fever/chills, saddle anesthesia, profound motor loss, weight loss, or sudden unremitting increase in pain.    Current pain medications include:    Lidocaine patch at site  Tylenol -   Baclofen 10-20 mg TID -   Subutex 2 mg TID sublingual -   Duloxetine 60 mg daily -   Methocarbamol 750 mg QID prn -   Lidocaine patchs and cream PRN  Medical cannabis - lozenges   Amitriptyline     Previous medication treatments included:  None    Other treatments have included:  Dinora Mcghee has been seen at a pain clinic in the past.  She was a patient of Dr. Mahajan and last saw her on 4/18/2023 for abdominal pain. She's had TAP blocks before for chronic abdominal pain. She had also followed with [facility], [place] Rogers    PT: None  Injections: Prior TAP blocks by Dr. Mahajan  Surgery: Multiple abdominal surgeries. Gtube replaced July 15, 2024  Alternative: n/a Ice - helpful  Heat   Tape to immobilize     Past Medical History:  Past Medical History:   Diagnosis Date     Allergic rhinitis, cause unspecified      Allergy to other foods     strawberries, apples, celeries, alice, watermelon     Arthritis     left knee     Choledocholithiasis     long after cholecystectomy     Chronic abdominal pain      Chronic constipation      Chronic nausea      Chronic pancreatitis (H)      Degeneration of lumbar or lumbosacral intervertebral disc      Esophageal reflux     w/ hiatal hernia     Gastroparesis      Hiatal hernia      History of pituitary adenoma     s/p resection     Hypothyroidism      Migraines      Mild hyperlipidemia      On tube feeding diet     presence of GJ tube     Pancreatic disease     PD stricture, 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      PONV (postoperative nausea and vomiting)      Portacath in place      Type 1 diabetes mellitus without complication (H) 5/9/2022     Unspecified hearing loss     25% high frequency R     Patient Active Problem List    Diagnosis Date Noted     Constipation  07/23/2024     Priority: Medium     Right upper quadrant abdominal pain 07/23/2024     Priority: Medium     S/P pancreatic islet cell transplantation (H) 07/23/2024     Priority: Medium     Bacteremia 11/10/2023     Priority: Medium     Abdominal pain, unspecified abdominal location 11/09/2023     Priority: Medium     Cholangitis (H28) 06/07/2023     Priority: Medium     On total parenteral nutrition (TPN) 06/04/2023     Priority: Medium     Fever, unspecified fever cause 06/04/2023     Priority: Medium     Chronic pancreatitis, unspecified pancreatitis type (H) 06/04/2023     Priority: Medium     History of food intolerance 05/09/2023     Priority: Medium     Malnutrition, unspecified type (H24) 05/09/2023     Priority: Medium     Nausea and vomiting, unspecified vomiting type 05/09/2023     Priority: Medium     Major depression, single episode 03/06/2023     Priority: Medium     Acute postoperative abdominal pain 01/31/2023     Priority: Medium     Feeding intolerance 12/30/2022     Priority: Medium     Myofascial pain 10/14/2022     Priority: Medium     Added automatically from request for surgery [id number]       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  pancreas and spleen removed, islet cells transplanted into liver    3 classes of vaccines needed .    1. Pneumococcal vaccines are as follows:       PCV 13 - one time dose (was given 2017)       PPSV23 - (given 12/1/16); repeat q 5 years      ROLE of PCV 20???    2. Meningococcal vaccines     Menactra - (last given 12/1/16) -  repeat q 5 yrs   NOTE: need to wait 4 wks after PCV 13 has been given.   OR - give Menveo instead as it can be given same time as PCV 13    AND   Bexcero - (got 12/9/16) - does not need to be repeated.     3. The Hib vaccine - (got 12/9/16) - does not need to be repeated.       Poisoning by drug 05/09/2022     Priority: Medium     Formatting of this note might  be different from the original.  Per Care Everywhere review:  All oral, IV and injectable steroids are contraindicated (unless in life threatening situations) in Islet Auto transplant recipients. They can cause irreversible loss of islet cell function. Please contact patient's transplant care coordinator ADI Gaffney RN at [phone]/pager [phone] and/or endocrinologist prior to administration.       Type 1 diabetes mellitus without complication (H) 05/09/2022     Priority: Medium     Formatting of this note might be different from the original.  ACTUALLY - DM 3c - pathogenic diabetes as no pancreas.  (pancreas and spleen removed, islet cells transplanted into liver)    Seeing ENDO at [facility] - Dr Erum Melissa       Intra-abdominal abscess (H) 12/03/2021     Priority: Medium     Postprocedural intraabdominal abscess 12/03/2021     Priority: Medium     Gastrostomy tube obstruction (H) 11/27/2021     Priority: Medium     Abdominal pain, generalized 09/18/2021     Priority: Medium     Adult failure to thrive 08/16/2021     Priority: Medium     Added automatically from request for surgery [id number]       Hypoglycemia 08/05/2021     Priority: Medium     Iron deficiency anemia 03/22/2019     Priority: Medium     Headache, chronic migraine without aura 09/18/2018     Priority: Medium     Chronic pain syndrome 09/18/2018     Priority: Medium     S/P hernia repair 08/23/2018     Priority: Medium     Incisional hernia, without obstruction or gangrene 05/20/2018     Priority: Medium     Adhesive capsulitis of shoulder, unspecified laterality 11/14/2017     Priority: Medium     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  Excess IgG4  [facility] Hematology       Gastroparesis      Priority: Medium     ACP (advance care planning) 03/28/2017     Priority:  Medium     Advance Care Planning 3/28/2017: Receipt of ACP document:  Received: Health Care Directive which was witnessed or notarized on 12/8/16.  Document previously scanned on 1/12/17.  Validation form completed and scanned.  Code Status reflects choices in most recent ACP document..  Confirmed/documented designated decision maker(s).  Added by May Baires   Advance Care Planning Liaison               Pancreatic insufficiency 01/17/2017     Priority: Medium     Diabetes insipidus (H24) 01/05/2017     Priority: Medium     Formatting of this note might be different from the original.  Diabetes Insipidus (DI)  Per external records.  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  Depression NOS       Other type of intractable migraine      Priority: Medium     Diagnosis updated by automated process. Provider to review and confirm.       GERD (gastroesophageal reflux disease) 12/01/2010     Priority: Medium     Lumbago 04/18/2005     Priority: Medium     History of L5-S1 degenerative disk disease.         Sensorineural hearing loss 01/10/2005     Priority: Medium     Problem list name updated by automated process. Provider to review       Vertiginous syndrome and labyrinthine disorder 01/10/2005     Priority: Medium     Problem list name updated by automated process. Provider to review  IMO Regulatory Load OCT 2020       Sprain and strain of other specified sites of hip and thigh 12/09/2002     Priority: Medium     Chondromalacia of patella 12/09/2002     Priority: Medium     Need for prophylactic immunotherapy       Priority: Medium     trees, grass, srw, dust mites, cat       Allergic rhinitis due to other allergen 2001     Priority: Medium     Hypothyroidism      Priority: Medium     Problem list name updated by automated process. Provider to review         Past Surgical History:  Past Surgical History:   Procedure Laterality Date     ABDOMEN SURGERY  1999    c sections: 93, 96, 98. endometriosis growth     APPENDECTOMY        SECTION  1993     COLONOSCOPY  2014     COLONOSCOPY N/A 2023    Procedure: COLONOSCOPY, WITH POLYPECTOMY AND BIOPSY;  Surgeon: Percy Chamorro MD;  [facility]     ENDOSCOPIC INSERTION TUBE GASTROSTOMY N/A 2021    Procedure: Gastrojejonostomy placement with jejunopexy, PEG tube exchange;  Surgeon: Zackery Montoya MD;  [facility]     ENDOSCOPIC RETROGRADE CHOLANGIOPANCREATOGRAM N/A 2015    Procedure: ENDOSCOPIC RETROGRADE CHOLANGIOPANCREATOGRAM;  Surgeon: Mandeep Park MD;  [facility]     ENDOSCOPIC RETROGRADE CHOLANGIOPANCREATOGRAM N/A 2016    Procedure: COMBINED ENDOSCOPIC RETROGRADE CHOLANGIOPANCREATOGRAPHY, PLACE TUBE/STENT;  Surgeon: Mandeep Park MD;  [facility]     ENDOSCOPIC RETROGRADE CHOLANGIOPANCREATOGRAM N/A 2016    Procedure: COMBINED ENDOSCOPIC RETROGRADE CHOLANGIOPANCREATOGRAPHY, REMOVE FOREIGN BODY OR STENT/TUBE;  Surgeon: Mandeep Park MD;  [facility]     ENDOSCOPIC RETROGRADE CHOLANGIOPANCREATOGRAM N/A 2016    Procedure: COMBINED ENDOSCOPIC RETROGRADE CHOLANGIOPANCREATOGRAPHY, PLACE TUBE/STENT;  Surgeon: Mandeep Park MD;  [facility]     ENDOSCOPIC RETROGRADE CHOLANGIOPANCREATOGRAM N/A 2016    Procedure: COMBINED ENDOSCOPIC RETROGRADE CHOLANGIOPANCREATOGRAPHY, REMOVE FOREIGN BODY OR STENT/TUBE;  Surgeon: Mandeep Park MD;  [facility]     ENDOSCOPIC ULTRASOUND UPPER GASTROINTESTINAL TRACT (GI) N/A 10/03/2016     Procedure: ENDOSCOPIC ULTRASOUND, ESOPHAGOSCOPY / UPPER GASTROINTESTINAL TRACT (GI);  Surgeon: Guru Jose Rodas MD;  [facility]     ENT SURGERY  1989    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     ENTEROSCOPY SMALL BOWEL N/A 02/03/2022    Procedure: ENTEROSCOPY with possible fistula closure;  Surgeon: Francisco Dodson MD;  Location:  GI     ESOPHAGOSCOPY, GASTROSCOPY, DUODENOSCOPY (EGD), COMBINED N/A 06/24/2015    Procedure: COMBINED ESOPHAGOSCOPY, GASTROSCOPY, DUODENOSCOPY (EGD), REMOVE FOREIGN BODY;  Surgeon: Mandeep Park MD;  Location:  GI     ESOPHAGOSCOPY, GASTROSCOPY, DUODENOSCOPY (EGD), COMBINED N/A 10/25/2015    Procedure: COMBINED ESOPHAGOSCOPY, GASTROSCOPY, DUODENOSCOPY (EGD);  Surgeon: Sammy Amaro MD;  Location:  GI     ESOPHAGOSCOPY, GASTROSCOPY, DUODENOSCOPY (EGD), COMBINED N/A 10/25/2015    Procedure: COMBINED ESOPHAGOSCOPY, GASTROSCOPY, DUODENOSCOPY (EGD), BIOPSY SINGLE OR MULTIPLE;  Surgeon: Sammy Amaro MD;  Location:  GI     ESOPHAGOSCOPY, GASTROSCOPY, DUODENOSCOPY (EGD), COMBINED N/A 01/31/2023    Procedure: ESOPHAGOGASTRODUODENOSCOPY (EGD) with peg replacement ;  Surgeon: Mandeep Park MD;  [facility]     ESOPHAGOSCOPY, GASTROSCOPY, DUODENOSCOPY (EGD), COMBINED N/A 7/24/2024    Procedure: Esophagoscopy, gastroscopy, duodenoscopy (EGD), combined;  Surgeon: Zackery Montoya MD;  Location:  GI     ESOPHAGOSCOPY, GASTROSCOPY, DUODENOSCOPY (EGD), DILATATION, COMBINED       EXCISE LESION TRUNK N/A 04/17/2017    Procedure: EXCISE LESION TRUNK;  Removal of Abdominal Foreign Body;  Surgeon: Nestor Phoenix MD;  [facility]     HC ESOPH/GAS REFLUX TEST W NASAL IMPED >1 HR N/A 11/19/2015    Procedure: ESOPHAGEAL IMPEDENCE FUNCTION TEST WITH 24 HOUR PH GREATER THAN 1 HOUR;  Surgeon: Thiago Apple MD;  [facility]     HC [facility] ENDOSCOPY DIAG W BIOPSY  09/17/2008      [facility] ENDOSCOPY DIAG W BIOPSY  09/27/2012       [facility] ENDOSCOPY W ESOPHAGEAL DILATION BALLOON <30MM  09/17/2008      [facility] ENDOSCOPY W EUS N/A 05/05/2015    Procedure: COMBINED ENDOSCOPIC ULTRASOUND, ESOPHAGOSCOPY, GASTROSCOPY, DUODENOSCOPY (EGD);  Surgeon: Wm Dueñas MD;  [facility]     HC WRIST ARTHROSCOP,RELEASE XVERS LIG Bilateral 12/17/2008     INJECT TRANSVERSUS ABDOMINIS PLANE (TAP) BLOCK BILATERAL Left 09/22/2016    Procedure: INJECT TRANSVERSUS ABDOMINIS PLANE (TAP) BLOCK BILATERAL;  Surgeon: Dickson Corrigan MD;  [facility]     INJECT TRIGGER POINT Bilateral 09/08/2022    Procedure: Abdominal trigger point injection with ultrasound;  Surgeon: Monika Mahajan MD;  [facility]     INJECT TRIGGER POINT SINGLE / MULTIPLE 3 OR MORE MUSCLES Right 11/15/2022    Procedure: Trigger point injections right abdomen with ultrasound guidance;  Surgeon: Monika Mahajan MD;  [facility]: [facility]     IR CHEST PORT PLACEMENT < 5 YRS OF AGE  06/10/2022     IR CVC TUNNEL PLACEMENT > 5 YRS OF AGE  4/15/2024     IR PORT REMOVAL RIGHT  11/09/2023     laparoscopic pineda  01/01/1995     LAPAROSCOPIC HERNIORRHAPHY INCISIONAL N/A 08/23/2018    Procedure: LAPAROSCOPIC HERNIORRHAPHY INCISIONAL;  Laparoscopic Incisional Hernia Repair with Symbotex Mesh Implant;  Surgeon: Nestor Phoenix MD;  [facility]     LAPAROSCOPIC PANCREATECTOMY, TRANSPLANT AUTO ISLET CELL N/A 12/28/2016    Procedure: LAPAROSCOPIC PANCREATECTOMY, TRANSPLANT AUTO ISLET CELL;  Surgeon: Nestor Phoenix MD;  [facility]     LAPAROTOMY EXPLORATORY N/A 01/31/2023    Procedure: Exploratory Laparotomy, lysis of adhesions, Perforated J-Junostomy Resection, Replace J-Junostomy site;  Surgeon: Elver Bauer MD;  [facility]     LAPAROTOMY, LYSIS ADHESIONS, COMBINED N/A 01/31/2023    Procedure: Dilatation of jejunostomy feeding tube, track with placement of jejunostomy tube with fluoroscopy;  Surgeon: Elver Bauer MD;  [facility]     PICC DOUBLE LUMEN PLACEMENT  Left 11/13/2023    Basilic Vein 5F DL 43 cm     REMOVE AND REPLACE BREAST IMPLANT PROSTHESIS N/A 12/30/2022    Procedure: Exploratory Laparotomy, lysis of adhesions, small bowel resection. Placement of gastric jejunostomy for enteral feeding.;  Surgeon: Elver Bauer MD;  [facility]     REMOVE GASTROSTOMY TUBE ADULT N/A 01/28/2022    Procedure: REMOVAL, GASTROSTOMY TUBE, ADULT;  Surgeon: Mandeep Park MD;  [facility]     REPLACE GASTROJEJUNOSTOMY TUBE, PERCUTANEOUS N/A 09/07/2021    Procedure: GASTROJEJUNOSTOMY TUBE PLACEMENT, PERCUTANEOUS, WITH GASTROPEXY;  Surgeon: Mandeep Park MD;  [facility]     REPLACE GASTROJEJUNOSTOMY TUBE, PERCUTANEOUS N/A 09/22/2021    Procedure: REPLACEMENT, GASTROJEJUNOSTOMY TUBE, PERCUTANEOUS;  Surgeon: Zackery Montoya MD;  [facility]     REPLACE GASTROJEJUNOSTOMY TUBE, PERCUTANEOUS N/A 11/22/2022    Procedure: REPLACEMENT, GASTROJEJUNOSTOMY TUBE, PERCUTANEOUS;  Surgeon: Mandeep Park MD;  [facility]     REPLACE GASTROJEJUNOSTOMY TUBE, PERCUTANEOUS N/A 12/09/2022    Procedure: REPLACEMENT, GASTROJEJUNOSTOMY TUBE, PERCUTANEOUS with ENDOSCOPIC SUTURING.;  Surgeon: Mandeep Park MD;  [facility]     REPLACE GASTROJEJUNOSTOMY TUBE, PERCUTANEOUS N/A 08/01/2023    Procedure: Replace Gastrojejunostomy Tube, Percutaneous;  Surgeon: Mandeep Park MD;  [facility]     REPLACE GASTROJEJUNOSTOMY TUBE, PERCUTANEOUS N/A 11/11/2023    Procedure: Replace Gastrojejunostomy Tube, Percutaneous;  Surgeon: Francisco Dodson MD;  [facility]     REPLACE GASTROJEJUNOSTOMY TUBE, PERCUTANEOUS N/A 12/8/2023    Procedure: Replace Gastrojejunostomy Tube, Percutaneous;  Surgeon: Mandeep Park MD;  [facility]     REPLACE JEJUNOSTOMY TUBE, PERCUTANEOUS N/A 09/10/2021    Procedure: UPPER ENDOSCOPY, REPLACEMENT OF PERCUTANEOUS GASTROJEJUNOSTOMY TUBE, T-TAG GASTROPEXY;  Surgeon: Zackery Montoya MD;   [facility]     REPLACE JEJUNOSTOMY TUBE, PERCUTANEOUS N/A 2021    Procedure: REPLACEMENT, JEJUNOSTOMY TUBE, PERCUTANEOUS;  Surgeon: Mandeep Park MD;  [facility]     REPLACE JEJUNOSTOMY TUBE, PERCUTANEOUS N/A 2023    Procedure: REPLACEMENT, JEJUNOSTOMY TUBE, PERCUTANEOUS;  Surgeon: Mandeep Park MD;  [facility]     REPLACE JEJUNOSTOMY TUBE, PERCUTANEOUS  2023    Procedure: Replace Jejunostomy Tube, Percutaneous;  Surgeon: Mandeep Park MD;  [facility]     REPLACE JEJUNOSTOMY TUBE, PERCUTANEOUS N/A 2024    Procedure: REPLACEMENT, JEJUNOSTOMY TUBE, PERCUTANEOUS;  Surgeon: Francisco Dodson MD;  [facility]     transphenoidal pituitary resection  1990     [facility]  DELIVERY ONLY  1996     [facility]  DELIVERY ONLY  1998    repeat c section with incidental cystotomy with repair     [facility] EXCIS PITUITARY,TRANSNASAL/SEPTAL  1980    pituitary tumor removed for diabetes insipidus     [facility] TOTAL ABDOM HYSTERECTOMY  1999    w/ bilateral salpingoophorectomy      Medications:  Current Outpatient Medications   Medication Sig Dispense Refill     acetaminophen (TYLENOL) 325 MG/10.15ML solution 20.3 mLs (650 mg) by Per J Tube route every 6 hours as needed for mild pain or fever 473 mL 4     baclofen (LIORESAL) 10 MG tablet 1-2 tablets (10-20 mg) by Per G Tube route 3 times daily 60 tablet 1     buprenorphine (SUBUTEX) 2 MG SUBL sublingual tablet Place 2 mg under the tongue 2 times daily       cetirizine (ZYRTEC) 10 MG tablet 1 tablet (10 mg) by Per G Tube route 2 times daily 60 tablet 11     COMPOUNDED NON-CONTROLLED SUBSTANCE (CMPD RX) - PHARMACY TO MIX COMPOUNDED MEDICATION Administer 60 mg amitriptyline 2 mg/ml via G-J tube at bedtime 900 mL 3     COMPOUNDED NON-CONTROLLED SUBSTANCE (CMPD RX) - PHARMACY TO MIX COMPOUNDED MEDICATION Administer 40 mg amitriptyline suspension (2 mg/mL) via G-J tube at bedtime. 600 mL 5      "COMPOUNDED NON-CONTROLLED SUBSTANCE (CMPD RX) - PHARMACY TO MIX COMPOUNDED MEDICATION Place 1 enema rectally 2 times daily as needed (for constipation) 60 each 3     Continuous Blood Gluc Sensor (FREESTYLE LISA 3 SENSOR) MISC CHANGE EVERY 14 DAYS 2 each 11     Digestive Enzyme Cartridge (RELIZORB) MARCO 2 Cartridges daily 60 each 6     diphenhydrAMINE (BENADRYL) 12.5 MG/5ML solution Take 25 mg by mouth 4 times daily as needed for other (nausea)       DULoxetine HCl 60 MG CSDR 1 capsule (60 mg) by Per J Tube route daily Sprinkle caps for feeding tube 90 capsule 3     estradiol (VAGIFEM) 10 MCG TABS vaginal tablet INSERT 1 TABLET(10 MCG) VAGINALLY 2 TIMES A WEEK 24 tablet 2     famotidine (PEPCID) 40 MG/5ML suspension 2.5 mLs (20 mg) by Per J Tube route daily 50 mL 4     fluconazole (DIFLUCAN) 40 MG/ML suspension Take 5 mLs by mouth every 24 hours       glucagon 1 MG kit Give 0.1 to 0.15mg( 10-15 units on Insulin sryinge) subcutaneous  every 15 minutes PRN for hypoglycemia. Remix new kit q24hr. Needs up to 3 kit/week. 10 each 3     glucose 40 % GEL gel Take 15-30 g by mouth every 15 minutes as needed for low blood sugar 3 Tube 2     ibuprofen (ADVIL/MOTRIN) 400 MG tablet take 30 mls  by oral route  every 4 - 6 hours as needed       insulin aspart (NOVOLOG FLEXPEN) 100 UNIT/ML pen Take 1 unit if BG is 175-275, and 2 units if >275 up to every 4 hours as needed.  \"Prime\" pen needle with 2 unit airshot before giving, needs supply for 15 unit/day. 15 mL 11     insulin aspart (NOVOLOG PEN) 100 UNIT/ML pen Take 1 unit if BG is 175-275, and 2 units if >275 up to every 4 hours as needed.  \"Prime\" pen needle with 2 unit airshot before giving, needs supply for 15 unit/day. 15 mL 11     insulin detemir (LEVEMIR PEN) 100 UNIT/ML pen Take 7 to 10 units per day, adjust as directed by MD.  Do 2 unit 'prime' before dosing, needs 12 units/day supply. 15 mL 5     insulin syringe-needle U-100 (30G X 1/2\" 0.3 ML) 30G X 1/2\" 0.3 ML " miscellaneous Use 3 syringes daily or as directed. 100 each 11     lactated ringers infusion Inject 1,000 mLs into the vein daily as needed       levothyroxine (SYNTHROID) 25 mcg/mL SUSP 5.48 mLs (137 mcg) by Per J Tube route daily 500 mL 3     lidocaine (LIDODERM) 5 % patch Place onto the skin every 24 hours To prevent lidocaine toxicity, patient should be patch free for 12 hrs daily.       lipase-protease-amylase (CREON) 28983-07636-218021 units CPEP per EC capsule Take 3-4 capsules by mouth 3 times daily (with meals) With oral meals 150 capsule 11     methocarbamol (ROBAXIN) 750 MG tablet Take 1 tablet (750 mg) by mouth 4 times daily as needed for muscle spasms 120 tablet 11     montelukast (SINGULAIR) 10 MG tablet Take by mouth every 24 hours       naloxegol (MOVANTIK) 12.5 MG TABS tablet Take 25 mg by mouth every morning (before breakfast)       Nutritional Supplements (BOOST HIGH PROTEIN) LIQD After above baseline labs are drawn, give: 6 mL/kg to maximum of 360 mL; the beverage is to be consumed within 5 minutes.  0     pantoprazole (PROTONIX) 40 mg IV push injection Inject 40 mg into the vein daily (with breakfast) 30 each 11     parenteral nutrition - PTA/DISCHARGE ORDER The TPN formula will print on the After Visit Summary Report. 1 each 0     polyethylene glycol (MIRALAX) 17 GM/Dose powder Please take 238 grams mixed with 64 oz of Gatorade at 4pm the night before surgery 238 g 0     prochlorperazine (COMPAZINE) 10 MG tablet Take 1 tablet (10 mg) by mouth every 6 hours as needed for nausea or vomiting 120 tablet 3     prochlorperazine (COMPRO) 25 MG suppository Place 1 suppository (25 mg) rectally every 12 hours as needed for nausea 10 suppository 2     promethazine 25 mg Inject 25 mg into the vein 3 times daily       promethazine-phenylephrine (PROMETHAZINE VC) 6.25-5 MG/5ML syrup Take 5 mLs by mouth every 6 hours       rimegepant (NURTEC) 75 MG ODT tablet Place 1 tablet (75 mg) under the tongue daily as  "needed for migraine Maximum of 1 tablet every 24 hours. 8 tablet 11     scopolamine (TRANSDERM) 1 MG/3DAYS 72 hr patch Place 1 patch onto the skin every 72 hours - Transdermal 10 patch 11     sennosides (SENOKOT) 8.8 MG/5ML syrup 5 mLs by Per J Tube route 2 times daily 236 mL 11     Sharps Container (BD SHARPS ) MISC 1 Container as needed 1 each 1     silver nitrate (ARZOL SILVER NIT APPLICATORS) 75-25 % miscellaneous Apply topically as needed for wound care Apply Silver Nitrate Sticks every 2-3 days until granulation tissue resolved.  \"Roll\" tip of Silver Nitrate Q tip directly onto the granulation tissue-bulging tissue area.  Have Agency or Home Care Nurse administer this, if you prefer.  Take care not to touch any healthy tissue or skin.  The tissue will turn white and eventually black & scab off.  May repeat if needed in the future for recurrence. 30 applicator 0     sodium phosphate, mineral oil, magnesium citrate enema Instill 1 pink 187 ml enema twice daily as needed for constipation       STATIN NOT PRESCRIBED (INTENTIONAL) Previous liver issues, risks vs benefits felt to not warrant statin.    Discussed Oct 2022 visit       SYNDROS 5 MG/ML oral soln TAKE 0.5 ML BY MOUTH 2 TIMES DAILY 60 mL 0     zinc oxide - white petrolatum (CRITIC-AID THICK MOIST BARRIER) 20-51% PSTE topical paste Apply 71 g topically every hour as needed for rash (Under J tube bumper when needed for skin protection) 170 g 0     ZOLMitriptan (ZOMIG-ZMT) 5 MG ODT Take 1 tablet (5 mg) by mouth at onset of headache for migraine Dissolve tablet in the mouth. May repeat in 2 hours. Max 2 tablets/24 hours. 12 tablet 6     Allergies:     Allergies   Allergen Reactions     Apple Juice Anaphylaxis     Corticosteroids Other (See Comments)     All oral, IV and injectable steroids are contraindicated (unless in life threatening situations) in [facility] [employer] transplant recipients. They can cause irreversible loss of islet cell function. Please " contact patient's transplant care coordinator ADI Gaffney RN at [phone]/pager [phone] and/or endocrinologist prior to administration.       Depakote [Divalproex Sodium] Other (See Comments)     Chest pain     Zithromax [Azithromycin Dihydrate] Anaphylaxis     Noted in 4/7/08 ER     Bromfenac      Other reaction(s): Headache     Codeine      Other reaction(s): Hallucinations     Darvocet [Propoxyphene N-Apap] Itching     Morphine Nausea and Vomiting and Rash     Nalbuphine Hcl Rash     RASH :nubaine     Zosyn [Piperacillin-Tazobactam In Dex] Rash     Possible allergy, did have a diffuse rash that seemed drug related but could have also been related to soap in the hospital.      Bactrim [Sulfamethoxazole W-Trimethoprim] Other (See Comments) and Nausea and Vomiting     Severely low liver function.     Statins Other (See Comments)     Previous liver issues, risks vs benefits felt to not warrant statin.  Discussed Oct 2022 visit     Tape [Adhesive Tape] Blisters     Tramadol      Zofran [Ondansetron] Other (See Comments)     migraine     Flagyl [Metronidazole] Hives and Rash         Family history:  Family History   Problem Relation Age of Onset     Lipids Mother      Hypertension Mother      Thyroid Disease Mother      Depression Mother      Angina Mother      GERD Mother      Skin Cancer Mother      Migraines Mother      Autoimmune Disease Mother      Hyperlipidemia Mother      Mental Illness Mother      Cerebrovascular Disease Mother         11/2023     Other Cancer Mother         skin-basil cell     Anxiety Disorder Mother      Eye Disorder Father         cataract, detached retina     Myocardial Infarction Father 60     Lipids Father      Cerebrovascular Disease Father      Depression Father      Substance Abuse Father      Anesthesia Reaction Father         stroke right after surgery     Cataracts Father      Osteoarthritis Father      Ulcerative Colitis Father      Autoimmune Disease Father      Heart  Disease Father      Hyperlipidemia Father      Mental Illness Father      Other Cancer Father         myloma     Anxiety Disorder Father      Interpersonal Violence Sister      Coronary Artery Disease Sister         angioplasty     GERD Sister      Substance Abuse Sister      Cerebrovascular Disease Sister         2005     Depression Sister      Thyroid Disease Sister      Eye Disorder Maternal Grandmother         cataract     Thyroid Disease Maternal Grandmother      Diabetes Maternal Grandfather      Eye Disorder Paternal Grandmother         cataract     Diabetes Paternal Grandmother      Eye Disorder Paternal Grandfather         cataract     Diabetes Paternal Grandfather      Substance Abuse Paternal Grandfather      Eye Disorder Son         ptosis     Depression Son      Anxiety Disorder Son      Heart Disease Paternal Aunt      Diabetes Paternal Aunt      Diabetes Paternal Uncle      Heart Disease Paternal Uncle      Depression Nephew      Anxiety Disorder Nephew      Thyroid Disease Nephew      Diabetes Type 2  Cousin         paternal cousin     Autoimmune Disease Cousin      Autoimmune Disease Sister      Depression Sister      Mental Illness Sister      Substance Abuse Sister      Thyroid Disease Sister      Depression Son      Mental Illness Son      Anxiety Disorder Son      Thyroid Disease Nephew      Anxiety Disorder Nephew        Physical Exam:  Vitals:    08/16/24 1314   BP: 121/72   BP Location: Right arm   Patient Position: Sitting   Cuff Size: Adult Regular   Pulse: 110   SpO2: 98%     Exam:  Constitutional: Well developed, NAD  Head: normocephalic. Atraumatic.   Eyes: no redness or jaundice noted   CV: warm, well perfused extremities   Skin: no suspicious lesions or rashes   Psychiatric: mentation appears normal and affect full    Abdomen:  Gtube site is tender to light touch of the 1-3 oclock positions, associated with skin appearing red and raw. Positive carnetts with crunch and active straight  leg raise; light touch and deep pressure painful at 1 o'clock site. Painful with Gtube movement between 12-3 oclock positions.    Diagnostic tests:    CT AP 7/23/2024  FINDINGS:     Lower thorax: Heart size is normal without pericardial effusion. Trace  basilar atelectasis. Calcified left lower lobe granuloma.     Liver: No mass. No intrahepatic biliary ductal dilation.     Biliary System: Cholecystectomy. No extrahepatic biliary ductal  dilation.     Pancreas: Surgically absent.     Adrenal glands: No mass or nodules     Spleen: Surgically absent.     Kidneys: No suspicious mass, obstructing calculus or hydronephrosis.     Gastrointestinal tract : Percutaneous gastrostomy tube is located  within the gastric lumen with no focal abnormality the gastrostomy  tract. Percutaneous jejunostomy in the left midabdomen with tip in the  jejunum. No abnormality of the jejunostomy tract. A few prominent  loops of small bowel at the small bowel anastomosis, similar compared  to prior. Small bowel is otherwise within normal limits. Large stool  burden within the colon with gaseous distention of the rectum.  Appendectomy.     Mesentery/peritoneum/retroperitoneum: No mass. No free fluid or air.     Lymph nodes: No significant lymphadenopathy.     Vasculature: Patent major abdominal vasculature.     Pelvis: Urinary bladder is distended.  Prior hysterectomy.     Osseous structures: No aggressive or acute osseous lesion.  L2  vertebral body hemangioma, unchanged.      Soft tissues: Within normal limits.                                                                      IMPRESSION:   1. Normal appearance of the percutaneous gastrostomy and percutaneous  jejunostomy with appropriate positioning.  2. Large colonic stool burden, which may indicate constipation.    Personally reviewed imaging: yes    Other testing (labs, diagnostics) reviewed:  A1c 5.4  Hgb 11.9      Assessment:  Gtube site pain   Chronic abdominal pain  Corticosteroid  "contraindicated  \"All oral, IV and injectable steroids are contraindicated (unless in life threatening situations) in [facility] [employer] transplant recipients. They can cause irreversible loss of islet cell function. Please contact patient's transplant care coordinator ADI Gaffney RN at [phone]/pager [phone] and/or endocrinologist prior to administration.\"    Dinora Mcghee is a 59 year old female with PMH of chronic pancreatitis s/p TPAIT, gastroparesis, constipation, GERD, migraines, T1DM with last A1c 5.4%, MDD and GJ placement originally on September 2021 presenting with a chief pain complaint of pain at her Gtube site. Pain may be secondary to irritation and rawness of chronic rubbing of the G tube on the skin and underlying tissues. We will proceed with a TAP block without steroid to attempt to help with this pain. It appears healing may require removal, but patient will need to follow-up with GI for an appropriate discussion on this, including risks/benefits.    Plan:  Diagnosis reviewed, treatment option addressed, and risk/benefits discussed.     Diagnosis reviewed, treatment option addressed, and risk/benefits discussed.   Procedures: left subcostal TAP block - medial to lateral approach to address the 1-3 o'clock position  Physical Therapy: None at this time.   Diagnostic Studies: CT AP reviewed. No new imaging at this time.   Medication Management: No new medications at this time. Continue following with [facility].   Follow up: For procedure    Patient staffed with attending physician, Dr. Anderson.     Adan Gomez DO  PM&R Resident  [facility]    Physician Attestation   I, Geremias Anderson MD, saw and evaluated this patient and agree with the findings and plan of care as documented in the note.  Any changes have been made above.    Geremias Anderson MD  Interventional Pain Medicine  [facility]          Again, thank you for allowing me to " participate in the care of your patient.      Sincerely,    Geremias Anderson MD

## 2024-08-19 ENCOUNTER — MYC MEDICAL ADVICE (OUTPATIENT)
Dept: TRANSPLANT | Facility: CLINIC | Age: 59
End: 2024-08-19

## 2024-08-19 ENCOUNTER — PREP FOR PROCEDURE (OUTPATIENT)
Dept: GASTROENTEROLOGY | Facility: CLINIC | Age: 59
End: 2024-08-19

## 2024-08-19 ENCOUNTER — PATIENT OUTREACH (OUTPATIENT)
Dept: GASTROENTEROLOGY | Facility: CLINIC | Age: 59
End: 2024-08-19
Payer: COMMERCIAL

## 2024-08-19 ENCOUNTER — ALLIED HEALTH/NURSE VISIT (OUTPATIENT)
Dept: GASTROENTEROLOGY | Facility: CLINIC | Age: 59
End: 2024-08-19
Payer: COMMERCIAL

## 2024-08-19 DIAGNOSIS — Z90.410 POST-PANCREATECTOMY DIABETES (H): ICD-10-CM

## 2024-08-19 DIAGNOSIS — K63.2 ENTEROCUTANEOUS FISTULA: ICD-10-CM

## 2024-08-19 DIAGNOSIS — E13.9 POST-PANCREATECTOMY DIABETES (H): ICD-10-CM

## 2024-08-19 DIAGNOSIS — E89.1 POST-PANCREATECTOMY DIABETES (H): ICD-10-CM

## 2024-08-19 DIAGNOSIS — Z46.59 ENCOUNTER FOR CARE RELATED TO FEEDING TUBE: Primary | ICD-10-CM

## 2024-08-19 DIAGNOSIS — K31.84 GASTROPARESIS: Primary | ICD-10-CM

## 2024-08-19 PROCEDURE — 99207 PR NO BILLABLE SERVICE THIS VISIT: CPT

## 2024-08-19 NOTE — NURSING NOTE
Patient and spouse present to clinic for Gtube removal. Gtube removal approved per Dr. Park      Site undressed, balloon deflated, Gtube removed in 1 piece. Site clean/dry/intact. Scant blood at wound entrance. Site dressed. Patient given wound care supplies.     Will arrange outpatient EGD for fistula closure +/- Gtube placement on  with Dr. Montoya in case its needed. Can cancel if procedure not needed:    Please assist in scheduling:     Procedure/Imaging/Clinic: EGD for fistula closure and possible Gtube placement  Physician: Lindsay  [email or web address]   Scope time needed:provider average  Anesthesia:General  Dx: gastroparesis, gastrocutaneous fistula  Tier:Tier 3 -   Surgeries/procedures that can be delayed  between 30 to 90 days with no significant  morbidity/mortality to patient or impact on  patient/disease outcome.   Location: [facility]  Header of letter for pt communication: Upper Endoscopy for fistula closure and possible Gtube replacement.       Patient understands plan    ML

## 2024-08-20 ENCOUNTER — TELEPHONE (OUTPATIENT)
Dept: PALLIATIVE MEDICINE | Facility: OTHER | Age: 59
End: 2024-08-20
Payer: COMMERCIAL

## 2024-08-20 ENCOUNTER — TELEPHONE (OUTPATIENT)
Dept: SURGERY | Facility: CLINIC | Age: 59
End: 2024-08-20
Payer: COMMERCIAL

## 2024-08-20 ENCOUNTER — DOCUMENTATION ONLY (OUTPATIENT)
Dept: ONCOLOGY | Facility: CLINIC | Age: 59
End: 2024-08-20
Payer: COMMERCIAL

## 2024-08-20 NOTE — TELEPHONE ENCOUNTER
[facility] Call Center    Phone Message    May a detailed message be left on voicemail: yes     Reason for Call: Other: Patient called to speak with Dr. Branch's nurse, to get surgery process started.  Please follow up with patient.     Action Taken: Message routed to:  [facility] Surgery Clinic CLR Nurses [facility]    Travel Screening: Not Applicable                                                                 '

## 2024-08-20 NOTE — TELEPHONE ENCOUNTER
Dr Melissa contacted patient to discuss symptoms and blood glucose levels. Agreed upon reduction of basal insulin to 2 units daily; medication list updated to reflect changes.     Tania Riley RN Transplant Coordinator on August 20, 2024 9:22 AM

## 2024-08-20 NOTE — TELEPHONE ENCOUNTER
Called patient to schedule surgery with Dr. Branch. Patient offered earlier Oct date declined due to a wedding they are attending     Spoke with: Dinora      Date of Surgery: 10/11/2024    Estimated Arrival time Discussed with Patient:  No    Location of surgery: [facility]     Pre-Op H&P: PAC 9/30/2024 at 1115am     WOC: Yes 9/30/2024 at 1230pm     Labs: Yes 9/30/2024 at 130pm     Imaging: No     Post-Op Appt Date w/ NP/PA:  Raeann Radford, APRN, CNP 10/28/24 at 215pm     Post-Op Appt Date w/ Surgeon:   Sarina  11/20/2024  at 600pm      Bowel Prep:  Yes  Full Prep WITHOUT antibiotics Sent via Letter Tab + 2 fleet enema day of surgery     Discussed with patient PAC RN will provide arrival time and instructions for surgery at the time of the appointment: [[place] locations only]: Yes      Standard Surgery Packet Sent: Yes 08/20/24  via Mail - Standard      Additional Comments:   Patient mentioned that she is severely allergic to the adhesive and worked with Aleyda to identify two different options that would work for her. Reassured patient that she would meet with Aleyda again on the 30th. Patient was pleased with this plan.    All patients questions were answered and was instructed to review surgical packet and call back with any questions or concerns.       Sheyla العلي on 8/20/2024 at 5:33 PM

## 2024-08-20 NOTE — PROGRESS NOTES
CLINICAL NUTRITION SERVICES     Reason for Contact: Questions from Oncology Distress Screening  1. How concerned are you about your ability to eat? :  10- she is tube feed.   2. How concerned are you about unintended weight loss or your current weight? : 2  9. If you want to be contacted by one of our professionals, I can send a message to them right now.:   declined     Action: None at this time. Patient follows with [facility] dietitian for TPN and tube feeds.     Tania Monsivais RD, LD  [phone]

## 2024-08-21 NOTE — TELEPHONE ENCOUNTER
FUTURE VISIT INFORMATION      SURGERY INFORMATION:  Date: 10/11/24  Location: uu or  Surgeon:  Kaylene Branch MD   Anesthesia Type:  general  Procedure: TOTAL ABDOMINAL COLECTOMY, LAPAROSCOPIC, POSSIBLE OPEN WITH ILEORECTAL ANASTAMOSIS, DIVERTING LOOP ILEOSTOMY     RECORDS REQUESTED FROM:       Primary Care Provider: Juan Jose Gomez MD - [facility]    Most recent EKG+ Tracin23    Most recent ECHO: 23

## 2024-08-25 ENCOUNTER — HEALTH MAINTENANCE LETTER (OUTPATIENT)
Age: 59
End: 2024-08-25

## 2024-08-26 ENCOUNTER — VIRTUAL VISIT (OUTPATIENT)
Dept: ONCOLOGY | Facility: CLINIC | Age: 59
End: 2024-08-26
Attending: SOCIAL WORKER
Payer: COMMERCIAL

## 2024-08-26 VITALS — WEIGHT: 135 LBS | BODY MASS INDEX: 20.46 KG/M2 | HEIGHT: 68 IN

## 2024-08-26 DIAGNOSIS — F43.23 ADJUSTMENT DISORDER WITH MIXED ANXIETY AND DEPRESSED MOOD: Primary | ICD-10-CM

## 2024-08-26 PROCEDURE — 90832 PSYTX W PT 30 MINUTES: CPT | Mod: 95 | Performed by: SOCIAL WORKER

## 2024-08-26 RX ORDER — HEPARIN SODIUM,PORCINE 10 UNIT/ML
5 VIAL (ML) INTRAVENOUS
OUTPATIENT
Start: 2024-08-26

## 2024-08-26 RX ORDER — NALOXONE HYDROCHLORIDE 0.4 MG/ML
0.2 INJECTION, SOLUTION INTRAMUSCULAR; INTRAVENOUS; SUBCUTANEOUS
OUTPATIENT
Start: 2024-08-26

## 2024-08-26 RX ORDER — HEPARIN SODIUM (PORCINE) LOCK FLUSH IV SOLN 100 UNIT/ML 100 UNIT/ML
5 SOLUTION INTRAVENOUS
OUTPATIENT
Start: 2024-08-26

## 2024-08-26 RX ORDER — DIPHENHYDRAMINE HYDROCHLORIDE 50 MG/ML
50 INJECTION INTRAMUSCULAR; INTRAVENOUS
Start: 2024-08-26

## 2024-08-26 RX ORDER — ALBUTEROL SULFATE 90 UG/1
1-2 AEROSOL, METERED RESPIRATORY (INHALATION)
Start: 2024-08-26

## 2024-08-26 RX ORDER — EPINEPHRINE 1 MG/ML
0.3 INJECTION, SOLUTION, CONCENTRATE INTRAVENOUS EVERY 5 MIN PRN
OUTPATIENT
Start: 2024-08-26

## 2024-08-26 RX ORDER — ALBUTEROL SULFATE 0.83 MG/ML
2.5 SOLUTION RESPIRATORY (INHALATION)
OUTPATIENT
Start: 2024-08-26

## 2024-08-26 ASSESSMENT — PAIN SCALES - GENERAL: PAINLEVEL: SEVERE PAIN (6)

## 2024-08-26 NOTE — PROGRESS NOTES
Virtual Visit Details    Type of service:  Video Visit     Originating Location (pt. Location): Home    Distant Location (provider location):  Off-site  Platform used for Video Visit: [facility]          Palliative Care Clinical Social Work Return Appointment    PLEASE NOTE:  THIS IS A MENTAL HEALTH NOTE.  OTHER PROVIDERS VIEWING THIS NOTE SHOULD USE THIS ONLY FOR UNDERSTANDING THE CONTEXT OF THE PATIENT'S EXPERIENCE.  TOPICS DESCRIBED IN THIS NOTE SHOULD NOT BE REFERENCED TO THE PATIENT BY MEDICAL PROVIDERS.    Dinora Mcghee is a 58  year old woman with multiple medical conditions including chronic pancreatitis s/p TPAIT, long term complications from gastroparesis, constipation, GERD, migraines.  Had GJ placed Sept 2021, recovery was complicated and she ended up with a venting g-tube.  Due to complications with PO tolerance she had surgical placement of a J tube, and a resection of densely adhered small bowel.  Started on tube feedings, then on TPN.  TPN eventually stopped due to infection.  Has heavy symptom burden that includes chronic nausea and vomiting, fatigue, complex medical management.  Has had multiple ER visits for GI and Bowel concerns. Since last visit was seen at [facility] for assessment and they also recommended surgical resection or bypass of her colon.  Seen today for counseling for adjustment disorder with depressed mood and anxiety.          Mental Status Exam:(List all that apply)      Appearance: Appropriate      Eye Contact: Good       Orientation: Yes, x4      Mood: Normal       Affect: Appropriate       Thought Content: Clear         Thought Form: Logical      Psychomotor Behavior: Normal    Visit themes:  grief, adjustment to illness, resilience     Adjustment to Illness: Nausea and pain today;       Mental Health (thoughts, feelings, actions, coping, and symptoms):   Met with GI team and plans now in place for surgery / colectomy in October. Overall, describes sense of relief and hope  that this procedure will help with her symptoms and allow improved quality of life.  Describes improved mood, less demoralization and sense of resolve as she prepares for surgery.        Helpful activities:       Helpful cognitions:     Unhelpful and/or triggering or exacerbating factors:     Body-Mind Skills Education: has training in behavioral interventions and uses tapping, EMDR, breathing exercises, guided imagery     Relationships:  and children, extended family    End of Life and Advance Care Planning:     Main therapeutic interventions provided this session include:     Provide psychoeducation and Facilitate processing of thoughts and feelings    Plan:  Will return for psychotherapy with palliative care focus in 3 weeks.        Time spent with patient/family:    32 minutes  (Start 2:00 end 2:32)    JAIDA Nguyen, [facility]   Palliative Care Clinical   Ph: [phone]      DO NOT SEND ANY LETTERS

## 2024-08-26 NOTE — LETTER
8/26/2024      Dinora Mcghee  [address] [place] [facility] [address]      Dear Colleague,    Thank you for referring your patient, Dinora Mcghee, to the [facility]. Please see a copy of my visit note below.    Virtual Visit Details    Type of service:  Video Visit     Originating Location (pt. Location): Home    Distant Location (provider location):  Off-site  Platform used for Video Visit: [facility]          Palliative Care Clinical Social Work Return Appointment    PLEASE NOTE:  THIS IS A MENTAL HEALTH NOTE.  OTHER PROVIDERS VIEWING THIS NOTE SHOULD USE THIS ONLY FOR UNDERSTANDING THE CONTEXT OF THE PATIENT'S EXPERIENCE.  TOPICS DESCRIBED IN THIS NOTE SHOULD NOT BE REFERENCED TO THE PATIENT BY MEDICAL PROVIDERS.    Dinora Mcghee is a 58  year old woman with multiple medical conditions including chronic pancreatitis s/p TPAIT, long term complications from gastroparesis, constipation, GERD, migraines.  Had GJ placed Sept 2021, recovery was complicated and she ended up with a venting g-tube.  Due to complications with PO tolerance she had surgical placement of a J tube, and a resection of densely adhered small bowel.  Started on tube feedings, then on TPN.  TPN eventually stopped due to infection.  Has heavy symptom burden that includes chronic nausea and vomiting, fatigue, complex medical management.  Has had multiple ER visits for GI and Bowel concerns. Since last visit was seen at [facility] for assessment and they also recommended surgical resection or bypass of her colon.  Seen today for counseling for adjustment disorder with depressed mood and anxiety.          Mental Status Exam:(List all that apply)      Appearance: Appropriate      Eye Contact: Good       Orientation: Yes, x4      Mood: Normal       Affect: Appropriate       Thought Content: Clear         Thought Form: Logical      Psychomotor Behavior: Normal    Visit themes:  grief, adjustment to illness, resilience      Adjustment to Illness: Nausea and pain today;       Mental Health (thoughts, feelings, actions, coping, and symptoms):   Met with GI team and plans now in place for surgery / colectomy in October. Overall, describes sense of relief and hope that this procedure will help with her symptoms and allow improved quality of life.  Describes improved mood, less demoralization and sense of resolve as she prepares for surgery.        Helpful activities:       Helpful cognitions:     Unhelpful and/or triggering or exacerbating factors:     Body-Mind Skills Education: has training in behavioral interventions and uses tapping, EMDR, breathing exercises, guided imagery     Relationships:  and children, extended family    End of Life and Advance Care Planning:     Main therapeutic interventions provided this session include:     Provide psychoeducation and Facilitate processing of thoughts and feelings    Plan:  Will return for psychotherapy with palliative care focus in 3 weeks.        Time spent with patient/family:    32 minutes  (Start 2:00 end 2:32)    JAIDA Nguyen, [facility]   Palliative Care Clinical   Ph: [phone]      DO NOT SEND ANY LETTERS                Again, thank you for allowing me to participate in the care of your patient.        Sincerely,        VASQUEZ Nguyen

## 2024-08-26 NOTE — NURSING NOTE
Current patient location: [address] [place] [id number]    Is the patient currently in the state of [place]? YES    Visit mode:VIDEO    If the visit is dropped, the patient can be reconnected by: VIDEO VISIT: Text to cell phone:   Telephone Information:   Mobile [phone]       Will anyone else be joining the visit? NO  (If patient encounters technical issues they should call [phone])    How would you like to obtain your AVS? MyChart    Are changes needed to the allergy or medication list? Pt stated no changes to allergies and Pt stated no med changes    Are refills needed on medications prescribed by this physician? NO    Rooming Documentation:  Not applicable      Reason for visit: EDWARDO FLETCHER

## 2024-08-28 ENCOUNTER — DOCUMENTATION ONLY (OUTPATIENT)
Dept: INTERNAL MEDICINE | Facility: CLINIC | Age: 59
End: 2024-08-28
Payer: COMMERCIAL

## 2024-08-28 NOTE — PROGRESS NOTES
Type of Form Received: [facility] 1415 POC 9/1-10/30    Form Received (Date) 8/27/24   Form Filled out Yes, fax 9/3/24   Placed in provider folder Yes

## 2024-08-30 ENCOUNTER — MYC MEDICAL ADVICE (OUTPATIENT)
Dept: INTERNAL MEDICINE | Facility: CLINIC | Age: 59
End: 2024-08-30
Payer: COMMERCIAL

## 2024-09-03 DIAGNOSIS — R10.84 ABDOMINAL PAIN, GENERALIZED: ICD-10-CM

## 2024-09-03 DIAGNOSIS — Z93.4 SMALL BOWEL TUBE FEEDING (H): ICD-10-CM

## 2024-09-03 DIAGNOSIS — Z46.59 ENCOUNTER FOR CARE RELATED TO FEEDING TUBE: Primary | ICD-10-CM

## 2024-09-03 NOTE — TELEPHONE ENCOUNTER
Per patient, edema has resolved and she declines to schedule office visit at time of call. Clarified that pre op is not needed for 9/11 procedure- and PAC is scheduled for October procedure

## 2024-09-04 ENCOUNTER — ANCILLARY PROCEDURE (OUTPATIENT)
Dept: CT IMAGING | Facility: CLINIC | Age: 59
End: 2024-09-04
Attending: INTERNAL MEDICINE
Payer: COMMERCIAL

## 2024-09-04 DIAGNOSIS — Z46.59 ENCOUNTER FOR CARE RELATED TO FEEDING TUBE: ICD-10-CM

## 2024-09-04 DIAGNOSIS — Z93.4 SMALL BOWEL TUBE FEEDING (H): ICD-10-CM

## 2024-09-04 DIAGNOSIS — R10.84 ABDOMINAL PAIN, GENERALIZED: ICD-10-CM

## 2024-09-04 LAB
CREAT BLD-MCNC: 0.6 MG/DL (ref 0.5–1)
EGFRCR SERPLBLD CKD-EPI 2021: >60 ML/MIN/1.73M2

## 2024-09-04 PROCEDURE — 74177 CT ABD & PELVIS W/CONTRAST: CPT | Mod: GC | Performed by: STUDENT IN AN ORGANIZED HEALTH CARE EDUCATION/TRAINING PROGRAM

## 2024-09-04 PROCEDURE — 82565 ASSAY OF CREATININE: CPT | Performed by: PATHOLOGY

## 2024-09-04 RX ORDER — HEPARIN SODIUM (PORCINE) LOCK FLUSH IV SOLN 100 UNIT/ML 100 UNIT/ML
500 SOLUTION INTRAVENOUS ONCE
Status: COMPLETED | OUTPATIENT
Start: 2024-09-04 | End: 2024-09-04

## 2024-09-04 RX ORDER — IOPAMIDOL 755 MG/ML
77 INJECTION, SOLUTION INTRAVASCULAR ONCE
Status: COMPLETED | OUTPATIENT
Start: 2024-09-04 | End: 2024-09-04

## 2024-09-04 RX ADMIN — HEPARIN SODIUM (PORCINE) LOCK FLUSH IV SOLN 100 UNIT/ML 500 UNITS: 100 SOLUTION at 12:48

## 2024-09-04 RX ADMIN — IOPAMIDOL 77 ML: 755 INJECTION, SOLUTION INTRAVASCULAR at 12:42

## 2024-09-04 NOTE — DISCHARGE INSTRUCTIONS

## 2024-09-04 NOTE — LETTER
Patient:  Dinora Mcghee  :   1965  MRN:     [id number]        Ms.Elaine EDER Mcghee  [address] [facility] [address]        September 10, 2024    Dear ,    We are writing to inform you of your test results. In summary, your cross sectional imaging was without concerning feature, suggesting that the J tube is well positioned and without complication. We will discuss your gastrostomy tract and related management when we meet tomorrow for your procedure.     I have included the formal documtentation of the results below. It continues to be a pleasure participating in your care.  Please feel free to contact our clinic with any further questions.      Sincerely,    Zackery Montoya MD PhD FACG KIZZY DIAZ  Professor of Medicine, Surgery and Pediatrics  Interventional and Therapeutic Endoscopy  Chief, Division of Gastroenterology and Hepatology and Nutrition  GI Service     [facility] [address] [address]    New Consultations  [phone]  Procedure Scheduling [phone]  Clinical Nurse Coordinator [phone]  Clinical Fax   [phone]  Administrative   [phone]  Administrative Fax  [phone]        Resulted Orders   CT Abdomen Pelvis w Contrast    Narrative    EXAMINATION: CT ABDOMEN PELVIS W CONTRAST, 2024 12:49 PM    INDICATION: Encounter for care related to feeding tube; Abdominal  pain, generalized; Small bowel tube feeding (H)    COMPARISON STUDY: CT 2024    TECHNIQUE: CT scan of the abdomen and pelvis was performed on  multidetector CT scanner using volumetric acquisition technique and  images were reconstructed in multiple planes with variable thickness  and reviewed on dedicated workstations.     CONTRAST: Isovue 370 77cc injected IV without oral contrast    CT scan radiation dose is optimized to minimum requisite dose using  automated dose modulation techniques.    FINDINGS:    Lower  chest: Unchanged left lower lobe calcified granuloma. Heart size  normal. No pleural effusion.    Liver: No mass. No intrahepatic biliary ductal dilation.    Biliary System: Cholecystectomy. No extrahepatic biliary ductal  dilation.    Pancreas: No mass or pancreatic ductal dilation.    Adrenal glands: No mass or nodules    Spleen: Surgically absent.    Kidneys: No suspicious mass, obstructing calculus or hydronephrosis.    Gastrointestinal tract: No abnormally dilated loops of bowel.  Decreased prominence of several small bowel loops near the small bowel  small bowel anastomosis. Stable positioning of the percutaneous  jejunostomy tube. Interval removal of a gastrostomy tube. The appendix  surgically absent.    Mesentery/peritoneum/retroperitoneum: No mass. No free fluid or air.    Lymph nodes: No significant lymphadenopathy.    Vasculature: Patent major abdominal vasculature.    Pelvis: Urinary bladder is normal.  Hysterectomy.    Osseous structures: No aggressive or acute osseous lesion. Unchanged  L2 hemangioma.      Soft tissues: Within normal limits.      Impression    IMPRESSION:   No acute abnormality of the abdomen and pelvis. Stable positioning of  the percutaneous jejunostomy tube.    I have personally reviewed the examination and initial interpretation  and I agree with the findings.    CARINE BOYD DO         SYSTEM ID:  [id number]

## 2024-09-05 ENCOUNTER — ENROLLMENT (OUTPATIENT)
Dept: HOME HEALTH SERVICES | Facility: HOME HEALTH | Age: 59
End: 2024-09-05
Payer: COMMERCIAL

## 2024-09-05 DIAGNOSIS — Z53.9 DIAGNOSIS NOT YET DEFINED: Primary | ICD-10-CM

## 2024-09-09 RX ORDER — NALOXEGOL OXALATE 25 MG/1
25 TABLET, FILM COATED ORAL
COMMUNITY
Start: 2024-09-04

## 2024-09-10 ENCOUNTER — ANESTHESIA EVENT (OUTPATIENT)
Dept: SURGERY | Facility: CLINIC | Age: 59
End: 2024-09-10
Payer: COMMERCIAL

## 2024-09-10 ENCOUNTER — PREP FOR PROCEDURE (OUTPATIENT)
Dept: GASTROENTEROLOGY | Facility: CLINIC | Age: 59
End: 2024-09-10
Payer: COMMERCIAL

## 2024-09-10 ENCOUNTER — PATIENT OUTREACH (OUTPATIENT)
Dept: GASTROENTEROLOGY | Facility: CLINIC | Age: 59
End: 2024-09-10
Payer: COMMERCIAL

## 2024-09-10 NOTE — TELEPHONE ENCOUNTER
Per patient, jtube came out last night, they pushed it back in, pain better than it was. Patient requests that Jtube be exchanged tomorrow when she's in OR for G placement.     Case mod placed, message sent to scheduling.    ML

## 2024-09-10 NOTE — ANESTHESIA PREPROCEDURE EVALUATION
Anesthesia Pre-Procedure Evaluation    Patient: Dinora Mcghee   MRN: [id number] : 1965        Procedure : Procedure(s):  Enteroscopy for Percutaneous Endoscopic Gastrostomy and Percutaneous Jejunostomy placement          Past Medical History:   Diagnosis Date     Allergic rhinitis, cause unspecified      Allergy to other foods     strawberries, apples, celeries, alice, watermelon     Arthritis     left knee     Choledocholithiasis     long after cholecystectomy     Chronic abdominal pain      Chronic constipation      Chronic nausea      Chronic pancreatitis (H)      Degeneration of lumbar or lumbosacral intervertebral disc      Esophageal reflux     w/ hiatal hernia     Gastroparesis      Hiatal hernia      History of pituitary adenoma     s/p resection     Hypothyroidism      Migraines      Mild hyperlipidemia      On tube feeding diet     presence of GJ tube     Pancreatic disease     PD stricture, 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 endometriosis growth     APPENDECTOMY        SECTION  1993     COLONOSCOPY  2014     COLONOSCOPY N/A 2023    Procedure: COLONOSCOPY, WITH POLYPECTOMY AND BIOPSY;  Surgeon: Percy Chamorro MD;  [facility]     ENDOSCOPIC INSERTION TUBE GASTROSTOMY N/A 2021    Procedure: Gastrojejonostomy placement with jejunopexy, PEG tube exchange;  Surgeon: Zackery Montoya MD;  [facility]     ENDOSCOPIC RETROGRADE CHOLANGIOPANCREATOGRAM N/A 2015    Procedure: ENDOSCOPIC RETROGRADE CHOLANGIOPANCREATOGRAM;  Surgeon: Mandeep Park MD;  [facility]     ENDOSCOPIC RETROGRADE CHOLANGIOPANCREATOGRAM N/A 2016     Procedure: COMBINED ENDOSCOPIC RETROGRADE CHOLANGIOPANCREATOGRAPHY, PLACE TUBE/STENT;  Surgeon: Mandeep Park MD;  [facility]     ENDOSCOPIC RETROGRADE CHOLANGIOPANCREATOGRAM N/A 03/17/2016    Procedure: COMBINED ENDOSCOPIC RETROGRADE CHOLANGIOPANCREATOGRAPHY, REMOVE FOREIGN BODY OR STENT/TUBE;  Surgeon: Mandeep Park MD;  [facility]     ENDOSCOPIC RETROGRADE CHOLANGIOPANCREATOGRAM N/A 08/02/2016    Procedure: COMBINED ENDOSCOPIC RETROGRADE CHOLANGIOPANCREATOGRAPHY, PLACE TUBE/STENT;  Surgeon: Mandeep Park MD;  [facility]     ENDOSCOPIC RETROGRADE CHOLANGIOPANCREATOGRAM N/A 08/26/2016    Procedure: COMBINED ENDOSCOPIC RETROGRADE CHOLANGIOPANCREATOGRAPHY, REMOVE FOREIGN BODY OR STENT/TUBE;  Surgeon: Mandeep Park MD;  [facility]     ENDOSCOPIC ULTRASOUND UPPER GASTROINTESTINAL TRACT (GI) N/A 10/03/2016    Procedure: ENDOSCOPIC ULTRASOUND, ESOPHAGOSCOPY / UPPER GASTROINTESTINAL TRACT (GI);  Surgeon: Guru Jose Rodas MD;  Location:  OR     ENT SURGERY  1989    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     ENTEROSCOPY SMALL BOWEL N/A 02/03/2022    Procedure: ENTEROSCOPY with possible fistula closure;  Surgeon: Francisco Dodson MD;  Location:  GI     ESOPHAGOSCOPY, GASTROSCOPY, DUODENOSCOPY (EGD), COMBINED N/A 06/24/2015    Procedure: COMBINED ESOPHAGOSCOPY, GASTROSCOPY, DUODENOSCOPY (EGD), REMOVE FOREIGN BODY;  Surgeon: Mandeep Park MD;  Location:  GI     ESOPHAGOSCOPY, GASTROSCOPY, DUODENOSCOPY (EGD), COMBINED N/A 10/25/2015    Procedure: COMBINED ESOPHAGOSCOPY, GASTROSCOPY, DUODENOSCOPY (EGD);  Surgeon: Sammy Amaro MD;  Location:  GI     ESOPHAGOSCOPY, GASTROSCOPY, DUODENOSCOPY (EGD), COMBINED N/A 10/25/2015    Procedure: COMBINED ESOPHAGOSCOPY, GASTROSCOPY, DUODENOSCOPY (EGD), BIOPSY SINGLE OR MULTIPLE;  Surgeon: Sammy Amaro MD;  Location:  GI     ESOPHAGOSCOPY, GASTROSCOPY, DUODENOSCOPY (EGD), COMBINED  N/A 01/31/2023    Procedure: ESOPHAGOGASTRODUODENOSCOPY (EGD) with peg replacement ;  Surgeon: Mandepe Park MD;  [facility]     ESOPHAGOSCOPY, GASTROSCOPY, DUODENOSCOPY (EGD), COMBINED N/A 7/24/2024    Procedure: Esophagoscopy, gastroscopy, duodenoscopy (EGD), combined;  Surgeon: Zackery Montoya MD;  [facility]     ESOPHAGOSCOPY, GASTROSCOPY, DUODENOSCOPY (EGD), DILATATION, COMBINED       EXCISE LESION TRUNK N/A 04/17/2017    Procedure: EXCISE LESION TRUNK;  Removal of Abdominal Foreign Body;  Surgeon: Nestor Phoenix MD;  [facility]     HC ESOPH/GAS REFLUX TEST W NASAL IMPED >1 HR N/A 11/19/2015    Procedure: ESOPHAGEAL IMPEDENCE FUNCTION TEST WITH 24 HOUR PH GREATER THAN 1 HOUR;  Surgeon: Thiago Apple MD;  [facility]     HC UGI ENDOSCOPY DIAG W BIOPSY  09/17/2008     HC UGI ENDOSCOPY DIAG W BIOPSY  09/27/2012     HC UGI ENDOSCOPY W ESOPHAGEAL DILATION BALLOON <30MM  09/17/2008     HC UGI ENDOSCOPY W EUS N/A 05/05/2015    Procedure: COMBINED ENDOSCOPIC ULTRASOUND, ESOPHAGOSCOPY, GASTROSCOPY, DUODENOSCOPY (EGD);  Surgeon: Wm Dueñas MD;  [facility]     HC WRIST ARTHROSCOP,RELEASE XVERS LIG Bilateral 12/17/2008     INJECT TRANSVERSUS ABDOMINIS PLANE (TAP) BLOCK BILATERAL Left 09/22/2016    Procedure: INJECT TRANSVERSUS ABDOMINIS PLANE (TAP) BLOCK BILATERAL;  Surgeon: Dickson Corrigan MD;  [facility]     INJECT TRIGGER POINT Bilateral 09/08/2022    Procedure: Abdominal trigger point injection with ultrasound;  Surgeon: Monika Mahajan MD;  [facility]     INJECT TRIGGER POINT SINGLE / MULTIPLE 3 OR MORE MUSCLES Right 11/15/2022    Procedure: Trigger point injections right abdomen with ultrasound guidance;  Surgeon: Monika Mahajan MD;  [facility]     IR CHEST PORT PLACEMENT < 5 YRS OF AGE  06/10/2022     IR CVC TUNNEL PLACEMENT > 5 YRS OF AGE  4/15/2024     IR PORT REMOVAL RIGHT  11/09/2023     laparoscopic pineda  01/01/1995     LAPAROSCOPIC  HERNIORRHAPHY INCISIONAL N/A 08/23/2018    Procedure: LAPAROSCOPIC HERNIORRHAPHY INCISIONAL;  Laparoscopic Incisional Hernia Repair with Symbotex Mesh Implant;  Surgeon: Nestor Phoenix MD;  [facility]     LAPAROSCOPIC PANCREATECTOMY, TRANSPLANT AUTO ISLET CELL N/A 12/28/2016    Procedure: LAPAROSCOPIC PANCREATECTOMY, TRANSPLANT AUTO ISLET CELL;  Surgeon: Nestor Phoenix MD;  [facility]     LAPAROTOMY EXPLORATORY N/A 01/31/2023    Procedure: Exploratory Laparotomy, lysis of adhesions, Perforated J-Junostomy Resection, Replace J-Junostomy site;  Surgeon: Elver Bauer MD;  [facility]     LAPAROTOMY, LYSIS ADHESIONS, COMBINED N/A 01/31/2023    Procedure: Dilatation of jejunostomy feeding tube, track with placement of jejunostomy tube with fluoroscopy;  Surgeon: Elver Bauer MD;  [facility]     PICC DOUBLE LUMEN PLACEMENT Left 11/13/2023    Basilic Vein 5F DL 43 cm     REMOVE AND REPLACE BREAST IMPLANT PROSTHESIS N/A 12/30/2022    Procedure: Exploratory Laparotomy, lysis of adhesions, small bowel resection. Placement of gastric jejunostomy for enteral feeding.;  Surgeon: Elver Bauer MD;  [facility]     REMOVE GASTROSTOMY TUBE ADULT N/A 01/28/2022    Procedure: REMOVAL, GASTROSTOMY TUBE, ADULT;  Surgeon: Mandeep Park MD;  [facility]     REPLACE GASTROJEJUNOSTOMY TUBE, PERCUTANEOUS N/A 09/07/2021    Procedure: GASTROJEJUNOSTOMY TUBE PLACEMENT, PERCUTANEOUS, WITH GASTROPEXY;  Surgeon: Mandeep Park MD;  [facility]     REPLACE GASTROJEJUNOSTOMY TUBE, PERCUTANEOUS N/A 09/22/2021    Procedure: REPLACEMENT, GASTROJEJUNOSTOMY TUBE, PERCUTANEOUS;  Surgeon: Zackery Montoya MD;  [facility]     REPLACE GASTROJEJUNOSTOMY TUBE, PERCUTANEOUS N/A 11/22/2022    Procedure: REPLACEMENT, GASTROJEJUNOSTOMY TUBE, PERCUTANEOUS;  Surgeon: Mandeep Park MD;  [facility]     REPLACE GASTROJEJUNOSTOMY TUBE, PERCUTANEOUS N/A  2022    Procedure: REPLACEMENT, GASTROJEJUNOSTOMY TUBE, PERCUTANEOUS with ENDOSCOPIC SUTURING.;  Surgeon: Mandeep Park MD;  [facility]     REPLACE GASTROJEJUNOSTOMY TUBE, PERCUTANEOUS N/A 2023    Procedure: Replace Gastrojejunostomy Tube, Percutaneous;  Surgeon: Mandeep Park MD;  [facility]     REPLACE GASTROJEJUNOSTOMY TUBE, PERCUTANEOUS N/A 2023    Procedure: Replace Gastrojejunostomy Tube, Percutaneous;  Surgeon: Francisco Dodson MD;  [facility]     REPLACE GASTROJEJUNOSTOMY TUBE, PERCUTANEOUS N/A 2023    Procedure: Replace Gastrojejunostomy Tube, Percutaneous;  Surgeon: Mandeep Park MD;  [facility]     REPLACE JEJUNOSTOMY TUBE, PERCUTANEOUS N/A 09/10/2021    Procedure: UPPER ENDOSCOPY, REPLACEMENT OF PERCUTANEOUS GASTROJEJUNOSTOMY TUBE, T-TAG GASTROPEXY;  Surgeon: Zackery Montoya MD;  [facility]     REPLACE JEJUNOSTOMY TUBE, PERCUTANEOUS N/A 2021    Procedure: REPLACEMENT, JEJUNOSTOMY TUBE, PERCUTANEOUS;  Surgeon: Mandeep Park MD;  [facility]     REPLACE JEJUNOSTOMY TUBE, PERCUTANEOUS N/A 2023    Procedure: REPLACEMENT, JEJUNOSTOMY TUBE, PERCUTANEOUS;  Surgeon: Mandeep Park MD;  [facility]     REPLACE JEJUNOSTOMY TUBE, PERCUTANEOUS  2023    Procedure: Replace Jejunostomy Tube, Percutaneous;  Surgeon: Mandeep Park MD;  [facility]     REPLACE JEJUNOSTOMY TUBE, PERCUTANEOUS N/A 2024    Procedure: REPLACEMENT, JEJUNOSTOMY TUBE, PERCUTANEOUS;  Surgeon: Francisco Dodson MD;  [facility]     transphenoidal pituitary resection  1990     [facility]  DELIVERY ONLY  1996     [facility]  DELIVERY ONLY  1998    repeat c section with incidental cystotomy with repair     [facility] EXCIS PITUITARY,TRANSNASAL/SEPTAL  1980    pituitary tumor removed for diabetes insipidus     [facility] TOTAL ABDOM HYSTERECTOMY  1999    w/ bilateral salpingoophorectomy        Allergies   Allergen Reactions     Apple Juice Anaphylaxis     Corticosteroids Other (See Comments)     All oral, IV and injectable steroids are contraindicated (unless in life threatening situations) in [employer]let [employer] transplant recipients. They can cause irreversible loss of islet cell function. Please contact patient's transplant care coordinator ADI Gaffney RN at [phone]/pager [phone] and/or endocrinologist prior to administration.       Depakote [Divalproex Sodium] Other (See Comments)     Chest pain     Zithromax [Azithromycin Dihydrate] Anaphylaxis     Noted in 08 ER     Bromfenac      Other reaction(s): Headache     Codeine      Other reaction(s): Hallucinations     Darvocet [Propoxyphene N-Apap] Itching     Morphine Nausea and Vomiting and Rash     Nalbuphine Hcl Rash     RASH :nubaine     Zosyn [Piperacillin-Tazobactam In Dex] Rash     Possible allergy, did have a diffuse rash that seemed drug related but could have also been related to soap in the hospital.      Bactrim [Sulfamethoxazole W-Trimethoprim] Other (See Comments) and Nausea and Vomiting     Severely low liver function.     Statins Other (See Comments)     Previous liver issues, risks vs benefits felt to not warrant statin.  Discussed Oct 2022 visit     Tape [Adhesive Tape] Blisters     Tramadol      Zofran [Ondansetron] Other (See Comments)     migraine     Flagyl [Metronidazole] Hives and Rash      Social History     Tobacco Use     Smoking status: Former     Current packs/day: 0.00     Average packs/day: 0.5 packs/day for 6.3 years (3.2 ttl pk-yrs)     Types: Cigarettes     Start date: 1985     Quit date: 1992     Years since quittin.7     Smokeless tobacco: Not on file     Tobacco comments:     no 2nd hand   Substance Use Topics     Alcohol use: Not Currently     Alcohol/week: 3.0 - 6.0 standard drinks of alcohol     Types: 1 - 2 Glasses of wine, 1 - 2 Cans of beer, 1 - 2 Shots of liquor per week     Comment: none  since IGG      Wt Readings from Last 1 Encounters:   08/26/24 61.2 kg (135 lb)        Anesthesia Evaluation   Pt has had prior anesthetic. Type: General.    History of anesthetic complications  - PONV.      ROS/MED HX  ENT/Pulmonary:  - neg pulmonary ROS     Neurologic:     (+)      migraines,                          Cardiovascular:     (+) Dyslipidemia - -   -  - -                                      METS/Exercise Tolerance:     Hematologic: Comments:                 Musculoskeletal:   (+)  arthritis,             GI/Hepatic: Comment: Status post pancreas transplantation     (+) GERD, Asymptomatic on medication,    hiatal hernia,              Renal/Genitourinary:  - neg Renal ROS     Endo:     (+)  type II DM,        thyroid problem, hypothyroidism,           Psychiatric/Substance Use:  - neg psychiatric ROS     Infectious Disease:  - neg infectious disease ROS     Malignancy:  - neg malignancy ROS     Other:  - neg other ROS    (+)  , H/O Chronic Pain,         Physical Exam    Airway        Mallampati: I   TM distance: > 3 FB   Neck ROM: full   Mouth opening: > 3 cm    Respiratory Devices and Support         Dental       (+) Minor Abnormalities - some fillings, tiny chips      Cardiovascular   cardiovascular exam normal       Rhythm and rate: regular and normal     Pulmonary   pulmonary exam normal        breath sounds clear to auscultation       OUTSIDE LABS:  CBC:   Lab Results   Component Value Date    WBC 6.6 07/24/2024    WBC 7.8 07/23/2024    HGB 12.0 07/24/2024    HGB 12.6 07/23/2024    HCT 36.6 07/24/2024    HCT 39.8 07/23/2024     07/24/2024     07/23/2024     BMP:   Lab Results   Component Value Date     07/25/2024     07/24/2024    POTASSIUM 4.7 07/25/2024    POTASSIUM 4.6 07/24/2024    CHLORIDE 105 07/25/2024    CHLORIDE 101 07/24/2024    CO2 29 07/25/2024    CO2 28 07/24/2024    BUN 19.5 07/25/2024    BUN 21.8 (H) 07/24/2024    CR 0.6 09/04/2024    CR 0.54 07/25/2024    GLC  118 (H) 07/25/2024     (H) 07/25/2024     COAGS:   Lab Results   Component Value Date    PTT 29 01/07/2017    INR 1.01 07/25/2024    FIBR 225 12/28/2016     POC:   Lab Results   Component Value Date    BGM 85 08/24/2018    HCG Negative 12/19/2016     HEPATIC:   Lab Results   Component Value Date    ALBUMIN 3.5 07/25/2024    PROTTOTAL 5.9 (L) 07/25/2024    ALT 20 07/25/2024    AST 21 07/25/2024     (H) 06/04/2023    ALKPHOS 86 07/25/2024    BILITOTAL 0.3 07/25/2024     OTHER:   Lab Results   Component Value Date    PH 7.49 (H) 12/28/2016    LACT 0.9 07/23/2024    A1C 5.4 07/23/2024    KASSI 8.4 (L) 07/25/2024    PHOS 3.9 07/25/2024    MAG 2.0 07/25/2024    LIPASE 6 (L) 06/04/2023    AMYLASE 45 01/23/2017    TSH 0.89 11/12/2023    T4 1.13 03/23/2018    CRP 90.0 (H) 12/29/2016    SED 10 09/16/2021       Anesthesia Plan    ASA Status:  3    NPO Status:  NPO Appropriate    Anesthesia Type: General.     - Airway: ETT   Induction: Intravenous.   Maintenance: TIVA.   Techniques and Equipment:     - Lines/Monitors: BIS     Consents    Anesthesia Plan(s) and associated risks, benefits, and realistic alternatives discussed. Questions answered and patient/representative(s) expressed understanding.     - Discussed: Risks, Benefits and Alternatives for BOTH SEDATION and the PROCEDURE were discussed     - Discussed with:  Patient, Spouse      - Extended Intubation/Ventilatory Support Discussed: No.      - Patient is DNR/DNI Status: No     Use of blood products discussed: No .     Postoperative Care    Pain management: IV analgesics, Oral pain medications, Multi-modal analgesia.   PONV prophylaxis: Ondansetron (or other 5HT-3), Dexamethasone or Solumedrol     Comments:               Jemal Mora MD    I have reviewed the pertinent notes and labs in the chart from the past 30 days and (re)examined the patient.  Any updates or changes from those notes are reflected in this note.

## 2024-09-11 ENCOUNTER — ANESTHESIA (OUTPATIENT)
Dept: SURGERY | Facility: CLINIC | Age: 59
End: 2024-09-11
Payer: COMMERCIAL

## 2024-09-11 ENCOUNTER — HOSPITAL ENCOUNTER (OUTPATIENT)
Facility: CLINIC | Age: 59
Discharge: HOME | End: 2024-09-11
Attending: INTERNAL MEDICINE | Admitting: INTERNAL MEDICINE
Payer: COMMERCIAL

## 2024-09-11 ENCOUNTER — APPOINTMENT (OUTPATIENT)
Dept: GENERAL RADIOLOGY | Facility: CLINIC | Age: 59
End: 2024-09-11
Attending: INTERNAL MEDICINE
Payer: COMMERCIAL

## 2024-09-11 VITALS
WEIGHT: 139.99 LBS | HEART RATE: 89 BPM | BODY MASS INDEX: 21.22 KG/M2 | OXYGEN SATURATION: 96 % | HEIGHT: 68 IN | DIASTOLIC BLOOD PRESSURE: 75 MMHG | TEMPERATURE: 98.2 F | SYSTOLIC BLOOD PRESSURE: 110 MMHG | RESPIRATION RATE: 16 BRPM

## 2024-09-11 DIAGNOSIS — G89.18 ACUTE POSTOPERATIVE PAIN: Primary | ICD-10-CM

## 2024-09-11 LAB
GLUCOSE BLDC GLUCOMTR-MCNC: 145 MG/DL (ref 70–99)
GLUCOSE BLDC GLUCOMTR-MCNC: 152 MG/DL (ref 70–99)
GLUCOSE BLDC GLUCOMTR-MCNC: 65 MG/DL (ref 70–99)
GLUCOSE BLDC GLUCOMTR-MCNC: 66 MG/DL (ref 70–99)
GLUCOSE BLDC GLUCOMTR-MCNC: 69 MG/DL (ref 70–99)
GLUCOSE BLDC GLUCOMTR-MCNC: 97 MG/DL (ref 70–99)
UPPER GI ENDOSCOPY: NORMAL

## 2024-09-11 PROCEDURE — 44360 SMALL BOWEL ENDOSCOPY: CPT

## 2024-09-11 PROCEDURE — 250N000011 HC RX IP 250 OP 636

## 2024-09-11 PROCEDURE — 258N000001 HC RX 258: Performed by: STUDENT IN AN ORGANIZED HEALTH CARE EDUCATION/TRAINING PROGRAM

## 2024-09-11 PROCEDURE — 258N000003 HC RX IP 258 OP 636

## 2024-09-11 PROCEDURE — C1769 GUIDE WIRE: HCPCS | Performed by: INTERNAL MEDICINE

## 2024-09-11 PROCEDURE — 370N000017 HC ANESTHESIA TECHNICAL FEE, PER MIN: Performed by: INTERNAL MEDICINE

## 2024-09-11 PROCEDURE — 272N000002 HC OR SUPPLY OTHER OPNP: Performed by: INTERNAL MEDICINE

## 2024-09-11 PROCEDURE — 360N000083 HC SURGERY LEVEL 3 W/ FLUORO, PER MIN: Performed by: INTERNAL MEDICINE

## 2024-09-11 PROCEDURE — 82962 GLUCOSE BLOOD TEST: CPT

## 2024-09-11 PROCEDURE — 250N000025 HC SEVOFLURANE, PER MIN: Performed by: INTERNAL MEDICINE

## 2024-09-11 PROCEDURE — 250N000013 HC RX MED GY IP 250 OP 250 PS 637

## 2024-09-11 PROCEDURE — 250N000009 HC RX 250

## 2024-09-11 PROCEDURE — 272N000001 HC OR GENERAL SUPPLY STERILE: Performed by: INTERNAL MEDICINE

## 2024-09-11 PROCEDURE — 250N000013 HC RX MED GY IP 250 OP 250 PS 637: Performed by: STUDENT IN AN ORGANIZED HEALTH CARE EDUCATION/TRAINING PROGRAM

## 2024-09-11 PROCEDURE — 255N000002 HC RX 255 OP 636: Performed by: INTERNAL MEDICINE

## 2024-09-11 PROCEDURE — 250N000011 HC RX IP 250 OP 636: Performed by: ANESTHESIOLOGY

## 2024-09-11 PROCEDURE — 710N000012 HC RECOVERY PHASE 2, PER MINUTE: Performed by: INTERNAL MEDICINE

## 2024-09-11 PROCEDURE — 999N000179 XR SURGERY CARM FLUORO LESS THAN 5 MIN W STILLS: Mod: TC

## 2024-09-11 PROCEDURE — 710N000010 HC RECOVERY PHASE 1, LEVEL 2, PER MIN: Performed by: INTERNAL MEDICINE

## 2024-09-11 PROCEDURE — 999N000141 HC STATISTIC PRE-PROCEDURE NURSING ASSESSMENT: Performed by: INTERNAL MEDICINE

## 2024-09-11 RX ORDER — NALOXONE HYDROCHLORIDE 0.4 MG/ML
0.1 INJECTION, SOLUTION INTRAMUSCULAR; INTRAVENOUS; SUBCUTANEOUS
Status: DISCONTINUED | OUTPATIENT
Start: 2024-09-11 | End: 2024-09-11 | Stop reason: HOSPADM

## 2024-09-11 RX ORDER — NALOXONE HYDROCHLORIDE 0.4 MG/ML
0.4 INJECTION, SOLUTION INTRAMUSCULAR; INTRAVENOUS; SUBCUTANEOUS
Status: DISCONTINUED | OUTPATIENT
Start: 2024-09-11 | End: 2024-09-11 | Stop reason: HOSPADM

## 2024-09-11 RX ORDER — PROPOFOL 10 MG/ML
INJECTION, EMULSION INTRAVENOUS CONTINUOUS PRN
Status: DISCONTINUED | OUTPATIENT
Start: 2024-09-11 | End: 2024-09-11

## 2024-09-11 RX ORDER — ONDANSETRON 4 MG/1
4 TABLET, ORALLY DISINTEGRATING ORAL EVERY 30 MIN PRN
Status: DISCONTINUED | OUTPATIENT
Start: 2024-09-11 | End: 2024-09-11 | Stop reason: HOSPADM

## 2024-09-11 RX ORDER — FENTANYL CITRATE 50 UG/ML
25 INJECTION, SOLUTION INTRAMUSCULAR; INTRAVENOUS EVERY 5 MIN PRN
Status: DISCONTINUED | OUTPATIENT
Start: 2024-09-11 | End: 2024-09-11 | Stop reason: HOSPADM

## 2024-09-11 RX ORDER — HYDROMORPHONE HCL IN WATER/PF 6 MG/30 ML
0.4 PATIENT CONTROLLED ANALGESIA SYRINGE INTRAVENOUS EVERY 5 MIN PRN
Status: DISCONTINUED | OUTPATIENT
Start: 2024-09-11 | End: 2024-09-11 | Stop reason: HOSPADM

## 2024-09-11 RX ORDER — HYDROMORPHONE HCL IN WATER/PF 6 MG/30 ML
0.2 PATIENT CONTROLLED ANALGESIA SYRINGE INTRAVENOUS EVERY 5 MIN PRN
Status: DISCONTINUED | OUTPATIENT
Start: 2024-09-11 | End: 2024-09-11 | Stop reason: HOSPADM

## 2024-09-11 RX ORDER — SODIUM CHLORIDE, SODIUM LACTATE, POTASSIUM CHLORIDE, CALCIUM CHLORIDE 600; 310; 30; 20 MG/100ML; MG/100ML; MG/100ML; MG/100ML
INJECTION, SOLUTION INTRAVENOUS CONTINUOUS
Status: DISCONTINUED | OUTPATIENT
Start: 2024-09-11 | End: 2024-09-11 | Stop reason: HOSPADM

## 2024-09-11 RX ORDER — NALOXONE HYDROCHLORIDE 0.4 MG/ML
0.2 INJECTION, SOLUTION INTRAMUSCULAR; INTRAVENOUS; SUBCUTANEOUS
Status: DISCONTINUED | OUTPATIENT
Start: 2024-09-11 | End: 2024-09-11 | Stop reason: HOSPADM

## 2024-09-11 RX ORDER — FLUMAZENIL 0.1 MG/ML
0.2 INJECTION, SOLUTION INTRAVENOUS
Status: DISCONTINUED | OUTPATIENT
Start: 2024-09-11 | End: 2024-09-11 | Stop reason: HOSPADM

## 2024-09-11 RX ORDER — OXYCODONE HYDROCHLORIDE 5 MG/1
5 TABLET ORAL
Status: COMPLETED | OUTPATIENT
Start: 2024-09-11 | End: 2024-09-11

## 2024-09-11 RX ORDER — PROCHLORPERAZINE MALEATE 5 MG
10 TABLET ORAL EVERY 6 HOURS PRN
Status: DISCONTINUED | OUTPATIENT
Start: 2024-09-11 | End: 2024-09-11 | Stop reason: HOSPADM

## 2024-09-11 RX ORDER — ONDANSETRON 2 MG/ML
4 INJECTION INTRAMUSCULAR; INTRAVENOUS EVERY 30 MIN PRN
Status: DISCONTINUED | OUTPATIENT
Start: 2024-09-11 | End: 2024-09-11 | Stop reason: HOSPADM

## 2024-09-11 RX ORDER — DEXTROSE MONOHYDRATE 25 G/50ML
25-50 INJECTION, SOLUTION INTRAVENOUS
Status: DISCONTINUED | OUTPATIENT
Start: 2024-09-11 | End: 2024-09-11 | Stop reason: HOSPADM

## 2024-09-11 RX ORDER — LIDOCAINE 40 MG/G
CREAM TOPICAL
Status: DISCONTINUED | OUTPATIENT
Start: 2024-09-11 | End: 2024-09-11 | Stop reason: HOSPADM

## 2024-09-11 RX ORDER — PROPOFOL 10 MG/ML
INJECTION, EMULSION INTRAVENOUS PRN
Status: DISCONTINUED | OUTPATIENT
Start: 2024-09-11 | End: 2024-09-11

## 2024-09-11 RX ORDER — OXYCODONE HYDROCHLORIDE 5 MG/1
5 TABLET ORAL EVERY 6 HOURS PRN
Qty: 12 TABLET | Refills: 0 | Status: SHIPPED | OUTPATIENT
Start: 2024-09-11 | End: 2024-09-14

## 2024-09-11 RX ORDER — ONDANSETRON 2 MG/ML
4 INJECTION INTRAMUSCULAR; INTRAVENOUS
Status: DISCONTINUED | OUTPATIENT
Start: 2024-09-11 | End: 2024-09-11 | Stop reason: HOSPADM

## 2024-09-11 RX ORDER — ACETAMINOPHEN 325 MG/1
975 TABLET ORAL ONCE
Status: COMPLETED | OUTPATIENT
Start: 2024-09-11 | End: 2024-09-11

## 2024-09-11 RX ORDER — ONDANSETRON 2 MG/ML
4 INJECTION INTRAMUSCULAR; INTRAVENOUS EVERY 6 HOURS PRN
Status: DISCONTINUED | OUTPATIENT
Start: 2024-09-11 | End: 2024-09-11 | Stop reason: HOSPADM

## 2024-09-11 RX ORDER — LIDOCAINE HYDROCHLORIDE 20 MG/ML
INJECTION, SOLUTION INFILTRATION; PERINEURAL PRN
Status: DISCONTINUED | OUTPATIENT
Start: 2024-09-11 | End: 2024-09-11

## 2024-09-11 RX ORDER — OXYCODONE HYDROCHLORIDE 10 MG/1
10 TABLET ORAL
Status: DISCONTINUED | OUTPATIENT
Start: 2024-09-11 | End: 2024-09-11 | Stop reason: HOSPADM

## 2024-09-11 RX ORDER — FENTANYL CITRATE 50 UG/ML
50 INJECTION, SOLUTION INTRAMUSCULAR; INTRAVENOUS ONCE
Status: COMPLETED | OUTPATIENT
Start: 2024-09-11 | End: 2024-09-11

## 2024-09-11 RX ORDER — ONDANSETRON 4 MG/1
4 TABLET, ORALLY DISINTEGRATING ORAL EVERY 6 HOURS PRN
Status: DISCONTINUED | OUTPATIENT
Start: 2024-09-11 | End: 2024-09-11 | Stop reason: HOSPADM

## 2024-09-11 RX ORDER — IOPAMIDOL 510 MG/ML
INJECTION, SOLUTION INTRAVASCULAR PRN
Status: DISCONTINUED | OUTPATIENT
Start: 2024-09-11 | End: 2024-09-11 | Stop reason: HOSPADM

## 2024-09-11 RX ORDER — HALOPERIDOL 5 MG/ML
1 INJECTION INTRAMUSCULAR
Status: DISCONTINUED | OUTPATIENT
Start: 2024-09-11 | End: 2024-09-11 | Stop reason: HOSPADM

## 2024-09-11 RX ORDER — NICOTINE POLACRILEX 4 MG
15-30 LOZENGE BUCCAL
Status: DISCONTINUED | OUTPATIENT
Start: 2024-09-11 | End: 2024-09-11 | Stop reason: HOSPADM

## 2024-09-11 RX ORDER — DEXAMETHASONE SODIUM PHOSPHATE 4 MG/ML
4 INJECTION, SOLUTION INTRA-ARTICULAR; INTRALESIONAL; INTRAMUSCULAR; INTRAVENOUS; SOFT TISSUE
Status: DISCONTINUED | OUTPATIENT
Start: 2024-09-11 | End: 2024-09-11 | Stop reason: HOSPADM

## 2024-09-11 RX ORDER — CEFAZOLIN SODIUM/WATER 2 G/20 ML
SYRINGE (ML) INTRAVENOUS PRN
Status: DISCONTINUED | OUTPATIENT
Start: 2024-09-11 | End: 2024-09-11

## 2024-09-11 RX ORDER — SODIUM CHLORIDE, SODIUM LACTATE, POTASSIUM CHLORIDE, CALCIUM CHLORIDE 600; 310; 30; 20 MG/100ML; MG/100ML; MG/100ML; MG/100ML
INJECTION, SOLUTION INTRAVENOUS CONTINUOUS PRN
Status: DISCONTINUED | OUTPATIENT
Start: 2024-09-11 | End: 2024-09-11

## 2024-09-11 RX ORDER — FENTANYL CITRATE 50 UG/ML
INJECTION, SOLUTION INTRAMUSCULAR; INTRAVENOUS PRN
Status: DISCONTINUED | OUTPATIENT
Start: 2024-09-11 | End: 2024-09-11

## 2024-09-11 RX ORDER — FENTANYL CITRATE 50 UG/ML
50 INJECTION, SOLUTION INTRAMUSCULAR; INTRAVENOUS EVERY 5 MIN PRN
Status: DISCONTINUED | OUTPATIENT
Start: 2024-09-11 | End: 2024-09-11 | Stop reason: HOSPADM

## 2024-09-11 RX ADMIN — OXYCODONE HYDROCHLORIDE 5 MG: 5 TABLET ORAL at 19:47

## 2024-09-11 RX ADMIN — MIDAZOLAM 2 MG: 1 INJECTION INTRAMUSCULAR; INTRAVENOUS at 16:45

## 2024-09-11 RX ADMIN — ACETAMINOPHEN 975 MG: 325 TABLET ORAL at 15:29

## 2024-09-11 RX ADMIN — SUGAMMADEX 100 MG: 100 INJECTION, SOLUTION INTRAVENOUS at 17:23

## 2024-09-11 RX ADMIN — PROPOFOL 100 MCG/KG/MIN: 10 INJECTION, EMULSION INTRAVENOUS at 17:10

## 2024-09-11 RX ADMIN — PHENYLEPHRINE HYDROCHLORIDE 100 MCG: 10 INJECTION INTRAVENOUS at 17:02

## 2024-09-11 RX ADMIN — SODIUM CHLORIDE, POTASSIUM CHLORIDE, SODIUM LACTATE AND CALCIUM CHLORIDE: 600; 310; 30; 20 INJECTION, SOLUTION INTRAVENOUS at 16:53

## 2024-09-11 RX ADMIN — DEXTROSE MONOHYDRATE 25 ML: 25 INJECTION, SOLUTION INTRAVENOUS at 18:41

## 2024-09-11 RX ADMIN — FENTANYL CITRATE 50 MCG: 50 INJECTION, SOLUTION INTRAMUSCULAR; INTRAVENOUS at 19:39

## 2024-09-11 RX ADMIN — PHENYLEPHRINE HYDROCHLORIDE 50 MCG: 10 INJECTION INTRAVENOUS at 17:05

## 2024-09-11 RX ADMIN — Medication 50 MG: at 16:59

## 2024-09-11 RX ADMIN — PROPOFOL 110 MG: 10 INJECTION, EMULSION INTRAVENOUS at 16:59

## 2024-09-11 RX ADMIN — FENTANYL CITRATE 50 MCG: 50 INJECTION, SOLUTION INTRAMUSCULAR; INTRAVENOUS at 16:00

## 2024-09-11 RX ADMIN — PROPOFOL 20 MG: 10 INJECTION, EMULSION INTRAVENOUS at 17:22

## 2024-09-11 RX ADMIN — DEXTROSE 15 G: 15 GEL ORAL at 18:22

## 2024-09-11 RX ADMIN — PHENYLEPHRINE HYDROCHLORIDE 100 MCG: 10 INJECTION INTRAVENOUS at 17:13

## 2024-09-11 RX ADMIN — PHENYLEPHRINE HYDROCHLORIDE 50 MCG: 10 INJECTION INTRAVENOUS at 17:00

## 2024-09-11 RX ADMIN — Medication 2 G: at 17:05

## 2024-09-11 RX ADMIN — SUGAMMADEX 50 MG: 100 INJECTION, SOLUTION INTRAVENOUS at 17:30

## 2024-09-11 RX ADMIN — FOSAPREPITANT 150 MG: 150 INJECTION, POWDER, LYOPHILIZED, FOR SOLUTION INTRAVENOUS at 17:59

## 2024-09-11 RX ADMIN — LIDOCAINE HYDROCHLORIDE 60 MG: 20 INJECTION, SOLUTION INFILTRATION; PERINEURAL at 16:59

## 2024-09-11 RX ADMIN — FENTANYL CITRATE 50 MCG: 50 INJECTION, SOLUTION INTRAMUSCULAR; INTRAVENOUS at 19:24

## 2024-09-11 RX ADMIN — FENTANYL CITRATE 100 MCG: 50 INJECTION INTRAMUSCULAR; INTRAVENOUS at 16:59

## 2024-09-11 ASSESSMENT — ACTIVITIES OF DAILY LIVING (ADL)
ADLS_ACUITY_SCORE: 36
ADLS_ACUITY_SCORE: 34
ADLS_ACUITY_SCORE: 36
ADLS_ACUITY_SCORE: 34
ADLS_ACUITY_SCORE: 36
ADLS_ACUITY_SCORE: 36

## 2024-09-11 NOTE — ANESTHESIA PROCEDURE NOTES
Airway       Patient location during procedure: OR       Procedure Start/Stop Times: 9/11/2024 5:01 PM  Staff -        CRNA: Dustin Rubi APRN CRNA       Performed By: CRNA  Consent for Airway        Urgency: elective  Indications and Patient Condition       Indications for airway management: andrez-procedural       Induction type:intravenous       Mask difficulty assessment: 1 - vent by mask    Final Airway Details       Final airway type: endotracheal airway       Successful airway: ETT - single and Oral  Endotracheal Airway Details        ETT size (mm): 7.0       Cuffed: yes       Successful intubation technique: direct laryngoscopy       DL Blade Type: Pino 2       Grade View of Cords: 1       Adjucts: stylet       Position: Right       Measured from: lips       Secured at (cm): 20       Bite block used: None    Post intubation assessment        Placement verified by: capnometry        Number of attempts at approach: 1       Number of other approaches attempted: 0       Secured with: tape       Ease of procedure: easy       Dentition: Intact and Unchanged    Medication(s) Administered   Medication Administration Time: 9/11/2024 5:01 PM

## 2024-09-11 NOTE — BRIEF OP NOTE
[facility]    Brief Operative Note    Pre-operative diagnosis: Gastroparesis [K31.84]  Enterocutaneous fistula [K63.2]  Post-operative diagnosis Same as pre-operative diagnosis    Procedure: Upper endoscopy, gastrostomy tube placement and jejunostomy tube exchange, N/A - Mouth    Surgeon: Surgeons and Role:     * Zackery Montoya MD - Primary     * Wilbert Castellanos MD - Fellow - Assisting  Anesthesia: General   Estimated Blood Loss: 0 mL from 9/11/2024  4:49 PM to 9/11/2024  5:35 PM      Drains: None  Specimens: * No specimens in log *    Findings:     - EGD with successful PEG placement and J-tube exchange     Recommendations:   - Monitor in GI recovery   - All medications, diet and activity may resume when the effect of sedation resolves   - G tube and J tube may be used as previous     Complications: None.  Implants: * No implants in log *

## 2024-09-11 NOTE — PROGRESS NOTES
Pre-procedure note:    58 yo F with a PMH of TPAIT with duodenojejunostomy. She under went EGD on 7/24/24 with uncomplicated repositioning of malpositioned G-tube. She was referred for possible J-tube placement.

## 2024-09-11 NOTE — ANESTHESIA CARE TRANSFER NOTE
Patient: Dinora Mcghee    Procedure: Procedure(s):  Upper endoscopy, gastrostomy tube placement and jejunostomy tube exchange       Diagnosis: Gastroparesis [K31.84]  Enterocutaneous fistula [K63.2]  Diagnosis Additional Information: No value filed.    Anesthesia Type:   General     Note:    Oropharynx: oropharynx clear of all foreign objects and spontaneously breathing  Level of Consciousness: awake  Oxygen Supplementation: nasal cannula  Level of Supplemental Oxygen (L/min / FiO2): 2  Independent Airway: airway patency satisfactory and stable    Vital Signs Stable: post-procedure vital signs reviewed and stable  Report to RN Given: handoff report given  Patient transferred to: PACU    Handoff Report: Identifed the Patient, Identified the Reponsible Provider, Reviewed the pertinent medical history, Discussed the surgical course, Reviewed Intra-OP anesthesia mangement and issues during anesthesia, Set expectations for post-procedure period and Allowed opportunity for questions and acknowledgement of understanding      Vitals:  Vitals Value Taken Time   /74 09/11/24 1740   Temp     Pulse 81 09/11/24 1740   Resp 19 09/11/24 1740   SpO2 100 % 09/11/24 1740   Vitals shown include unfiled device data.    Electronically Signed By: NATALY Enciso CRNA  September 11, 2024  5:41 PM

## 2024-09-12 NOTE — ANESTHESIA POSTPROCEDURE EVALUATION
Patient: Dinora Mcghee    Procedure: Procedure(s):  Upper endoscopy, gastrostomy tube placement and jejunostomy tube exchange       Anesthesia Type:  General    Note:  Disposition: Outpatient   Postop Pain Control: Challenging            Challenges/Interventions: Acute Pain            Sign Out: Well controlled pain (Managed with oxy.)   PONV: No   Neuro/Psych: Uneventful            Sign Out: Acceptable/Baseline neuro status   Airway/Respiratory: Uneventful            Sign Out: Acceptable/Baseline resp. status   CV/Hemodynamics: Uneventful            Sign Out: Acceptable CV status; No obvious hypovolemia; No obvious fluid overload   Other NRE: NONE   DID A NON-ROUTINE EVENT OCCUR? No           Last vitals:  Vitals Value Taken Time   /59 09/11/24 1930   Temp 37  C (98.6  F) 09/11/24 1740   Pulse 83 09/11/24 1940   Resp 20 09/11/24 1940   SpO2 97 % 09/11/24 1940   Vitals shown include unfiled device data.    Electronically Signed By: Jessica Villareal MD  September 11, 2024  7:40 PM

## 2024-09-12 NOTE — DISCHARGE INSTRUCTIONS
Contacting your Doctor -   To contact a doctor, call Dr Montoya's clinic at [phone]  or:  [phone] and ask for the resident on call for Gastroenterology (answered 24 hours a day)   Emergency Department:  [facility]: [phone]  [facility]: [phone] 911 if you are in need of immediate or emergent help        Impression:            - Uncomplicated exchange of the 14F jejunostomy tube                          (lower left) and placement of a 14F gastrostomy tube                          (right upper quadrant)   Recommendation:        - General anesthesia recovery with probable discharge                          home this evening                          - G tube may be used for decompression and medications                          without delay                          - J tube may be used for feeds without delay                          - Follow up with established providers as scheduled                          - The findings and recommendations were discussed with                          the patient and their family

## 2024-09-18 ENCOUNTER — TRANSFERRED RECORDS (OUTPATIENT)
Dept: HEALTH INFORMATION MANAGEMENT | Facility: CLINIC | Age: 59
End: 2024-09-18
Payer: COMMERCIAL

## 2024-09-19 ENCOUNTER — VIRTUAL VISIT (OUTPATIENT)
Dept: ONCOLOGY | Facility: CLINIC | Age: 59
End: 2024-09-19
Attending: SOCIAL WORKER
Payer: COMMERCIAL

## 2024-09-19 DIAGNOSIS — F43.23 ADJUSTMENT DISORDER WITH MIXED ANXIETY AND DEPRESSED MOOD: Primary | ICD-10-CM

## 2024-09-19 DIAGNOSIS — Z51.5 PALLIATIVE CARE PATIENT: ICD-10-CM

## 2024-09-19 PROCEDURE — 90834 PSYTX W PT 45 MINUTES: CPT | Mod: 95 | Performed by: SOCIAL WORKER

## 2024-09-19 NOTE — LETTER
9/19/2024      Dinora Mcghee  [address] [facility] [address]      Dear Colleague,    Thank you for referring your patient, Dinora Mcghee, to the [facility] [place]. Please see a copy of my visit note below.    Virtual Visit Details    Type of service:  Video Visit     Originating Location (pt. Location): Home    Distant Location (provider location):  On-site  Platform used for Video Visit: AmWell at start, completed last 15 minutes of session by phone due to connection issues          Palliative Care Clinical Social Work Return Appointment    PLEASE NOTE:  THIS IS A MENTAL HEALTH NOTE.  OTHER PROVIDERS VIEWING THIS NOTE SHOULD USE THIS ONLY FOR UNDERSTANDING THE CONTEXT OF THE PATIENT'S EXPERIENCE.  TOPICS DESCRIBED IN THIS NOTE SHOULD NOT BE REFERENCED TO THE PATIENT BY MEDICAL PROVIDERS.    Dinora Mcghee is a 59 year old woman with multiple medical conditions including chronic pancreatitis s/p TPAIT, long term complications from gastroparesis, constipation, GERD, migraines.  Had GJ placed Sept 2021, recovery was complicated and she ended up with a venting g-tube.  Due to complications with PO tolerance she had surgical placement of a J tube, and a resection of densely adhered small bowel.  Started on tube feedings, then on TPN.  TPN eventually stopped due to infection.  Has heavy symptom burden that includes chronic nausea and vomiting, fatigue, complex medical management.  Has had multiple ER visits for GI and Bowel concerns. Since last visit was seen at [facility] for assessment and they also recommended surgical resection or bypass of her colon.  Seen today for counseling for adjustment disorder with depressed mood and anxiety.       Visit was done virtually for 35 minutes before video connection was lost.  Remainder of the session was completed by phone.        Mental Status Exam:(List all that apply)      Appearance: Appropriate      Eye Contact: Good       Orientation: Yes,  x4      Mood: Normal       Affect: Appropriate       Thought Content: Clear         Thought Form: Logical      Psychomotor Behavior: Normal    Visit themes:  grief, adjustment to illness, resilience     Adjustment to Illness: Surgery scheduled for October 11      Mental Health (thoughts, feelings, actions, coping, and symptoms):  Preparing for surgery by completing tasks including updating her health care directive, doing surgery and healing focused mindfulness work.  Feeling strong emotionally and physically going into surgery.  Until the procedure she is scheduling fun activities to pass time with friends and family.  WIll attend her son's wedding celebration this weekend! Overall voices feeling hopeful and resolved.        Helpful activities:       Helpful cognitions:     Unhelpful and/or triggering or exacerbating factors:     Body-Mind Skills Education: has been using surgery preparation and healing focused meditations     Relationships:  and children, extended family    End of Life and Advance Care Planning:     Main therapeutic interventions provided this session include:     Provide psychoeducation and Facilitate processing of thoughts and feelings, psychotherapy    Plan:  Dinora is aware of my transition to a new practice next month.  She is aware of resources available for follow up.         Time spent with patient/family:    50 minutes  (Start 12:30 end 1:23)-- time marked as 50 minutes due to time needed to change to phone when video connection was lost.     JAIDA Nguyen, [facility]   Palliative Care Clinical   Ph: [phone]      DO NOT SEND ANY LETTERS                Again, thank you for allowing me to participate in the care of your patient.        Sincerely,        [facility] Ghosh [facility]

## 2024-09-19 NOTE — LETTER
9/19/2024      Dinora Mcghee  [address] [facility] [address]      Dear Colleague,    Thank you for referring your patient, Dinora Mcghee, to the [facility] [place]. Please see a copy of my visit note below.    Virtual Visit Details    Type of service:  Video Visit     Originating Location (pt. Location): Home    Distant Location (provider location):  On-site  Platform used for Video Visit: AmWell at start, completed last 15 minutes of session by phone due to connection issues          Palliative Care Clinical Social Work Return Appointment    PLEASE NOTE:  THIS IS A MENTAL HEALTH NOTE.  OTHER PROVIDERS VIEWING THIS NOTE SHOULD USE THIS ONLY FOR UNDERSTANDING THE CONTEXT OF THE PATIENT'S EXPERIENCE.  TOPICS DESCRIBED IN THIS NOTE SHOULD NOT BE REFERENCED TO THE PATIENT BY MEDICAL PROVIDERS.    Dinora Mcghee is a 59 year old woman with multiple medical conditions including chronic pancreatitis s/p TPAIT, long term complications from gastroparesis, constipation, GERD, migraines.  Had GJ placed Sept 2021, recovery was complicated and she ended up with a venting g-tube.  Due to complications with PO tolerance she had surgical placement of a J tube, and a resection of densely adhered small bowel.  Started on tube feedings, then on TPN.  TPN eventually stopped due to infection.  Has heavy symptom burden that includes chronic nausea and vomiting, fatigue, complex medical management.  Has had multiple ER visits for GI and Bowel concerns. Since last visit was seen at [facility] for assessment and they also recommended surgical resection or bypass of her colon.  Seen today for counseling for adjustment disorder with depressed mood and anxiety.       Visit was done virtually for 35 minutes before video connection was lost.  Remainder of the session was completed by phone.        Mental Status Exam:(List all that apply)      Appearance: Appropriate      Eye Contact: Good       Orientation: Yes,  x4      Mood: Normal       Affect: Appropriate       Thought Content: Clear         Thought Form: Logical      Psychomotor Behavior: Normal    Visit themes:  grief, adjustment to illness, resilience     Adjustment to Illness: Surgery scheduled for October 11      Mental Health (thoughts, feelings, actions, coping, and symptoms):  Preparing for surgery by completing tasks including updating her health care directive, doing surgery and healing focused mindfulness work.  Feeling strong emotionally and physically going into surgery.  Until the procedure she is scheduling fun activities to pass time with friends and family.  WIll attend her son's wedding celebration this weekend! Overall voices feeling hopeful and resolved.        Helpful activities:       Helpful cognitions:     Unhelpful and/or triggering or exacerbating factors:     Body-Mind Skills Education: has been using surgery preparation and healing focused meditations     Relationships:  and children, extended family    End of Life and Advance Care Planning:     Main therapeutic interventions provided this session include:     Provide psychoeducation and Facilitate processing of thoughts and feelings, psychotherapy    Plan:  Dinora is aware of my transition to a new practice next month.  She is aware of resources available for follow up.         Time spent with patient/family:    50 minutes  (Start 12:30 end 1:23)-- time marked as 50 minutes due to time needed to change to phone when video connection was lost.     JAIDA Nguyen, [facility]   Palliative Care Clinical   Ph: [phone]      DO NOT SEND ANY LETTERS                Again, thank you for allowing me to participate in the care of your patient.        Sincerely,        Sweta Ghosh [facility]

## 2024-09-19 NOTE — PROGRESS NOTES
"Virtual Visit Details    Type of service:  Video Visit     Originating Location (pt. Location): {video visit patient location:421023::\"Home\"}  {PROVIDER LOCATION On-site should be selected for visits conducted from your clinic location or adjoining [facility] hospital, academic office, or other nearby [facility] building. Off-site should be selected for all other provider locations, including home:[address]}  Distant Location (provider location):  {virtual location provider:598249}  Platform used for Video Visit: {Virtual Visit Platforms:187531::\"MissingLINK\"}  "

## 2024-09-19 NOTE — PROGRESS NOTES
Virtual Visit Details    Type of service:  Video Visit     Originating Location (pt. Location): Home    Distant Location (provider location):  On-site  Platform used for Video Visit: Emeli at start, completed last 15 minutes of session by phone due to connection issues          Palliative Care Clinical Social Work Return Appointment    PLEASE NOTE:  THIS IS A MENTAL HEALTH NOTE.  OTHER PROVIDERS VIEWING THIS NOTE SHOULD USE THIS ONLY FOR UNDERSTANDING THE CONTEXT OF THE PATIENT'S EXPERIENCE.  TOPICS DESCRIBED IN THIS NOTE SHOULD NOT BE REFERENCED TO THE PATIENT BY MEDICAL PROVIDERS.    Dinora Mcghee is a 59 year old woman with multiple medical conditions including chronic pancreatitis s/p TPAIT, long term complications from gastroparesis, constipation, GERD, migraines.  Had GJ placed Sept 2021, recovery was complicated and she ended up with a venting g-tube.  Due to complications with PO tolerance she had surgical placement of a J tube, and a resection of densely adhered small bowel.  Started on tube feedings, then on TPN.  TPN eventually stopped due to infection.  Has heavy symptom burden that includes chronic nausea and vomiting, fatigue, complex medical management.  Has had multiple ER visits for GI and Bowel concerns. Since last visit was seen at [facility] for assessment and they also recommended surgical resection or bypass of her colon.  Seen today for counseling for adjustment disorder with depressed mood and anxiety.       Visit was done virtually for 35 minutes before video connection was lost.  Remainder of the session was completed by phone.        Mental Status Exam:(List all that apply)      Appearance: Appropriate      Eye Contact: Good       Orientation: Yes, x4      Mood: Normal       Affect: Appropriate       Thought Content: Clear         Thought Form: Logical      Psychomotor Behavior: Normal    Visit themes:  grief, adjustment to illness, resilience     Adjustment to Illness: Surgery  scheduled for October 11      Mental Health (thoughts, feelings, actions, coping, and symptoms):  Preparing for surgery by completing tasks including updating her health care directive, doing surgery and healing focused mindfulness work.  Feeling strong emotionally and physically going into surgery.  Until the procedure she is scheduling fun activities to pass time with friends and family.  WIll attend her son's wedding celebration this weekend! Overall voices feeling hopeful and resolved.        Helpful activities:       Helpful cognitions:     Unhelpful and/or triggering or exacerbating factors:     Body-Mind Skills Education: has been using surgery preparation and healing focused meditations     Relationships:  and children, extended family    End of Life and Advance Care Planning:     Main therapeutic interventions provided this session include:     Provide psychoeducation and Facilitate processing of thoughts and feelings, psychotherapy    Plan:  Dinora is aware of my transition to a new practice next month.  She is aware of resources available for follow up.         Time spent with patient/family:    50 minutes  (Start 12:30 end 1:23)-- time marked as 50 minutes due to time needed to change to phone when video connection was lost.     JAIDA Nguyen, [facility]   Palliative Care Clinical   Ph: [phone]      DO NOT SEND ANY LETTERS

## 2024-09-20 ENCOUNTER — TELEPHONE (OUTPATIENT)
Dept: INTERNAL MEDICINE | Facility: CLINIC | Age: 59
End: 2024-09-20
Payer: COMMERCIAL

## 2024-09-20 NOTE — TELEPHONE ENCOUNTER
Patient confirmed scheduled appointment:  Date: 3/21/25  Time: 4:00pm  Visit type: [facility] Physical  Provider: Dr. Gomez

## 2024-09-23 NOTE — PROGRESS NOTES
AUTH REQUIRED DAY ORDERS (or CHANGE IN ORDERS) ARE RECEIVED. PLEASE NOTIFY PA TEAM ONCE ORDERS RECEIVED.    05/02/2023 TPN IS COVERED. CAN BE IVN IF PT NEEDS NURSING. WM  05/03/2024: PT HAS COVERAGE FOR ENTERAL AS LONG AS VIA TUBE.   PT HAS TPA, HYDRATION, AND LINE CARE COVERAGE [place]    06/11/24: Per Availity - NO PA REQ for , ,  SP    ME PT HAS COVERAGE FOR PROSOURCE TF [place]  07/19/2023 PT HAS [employer] COVERAGE. WM

## 2024-09-23 NOTE — PATIENT INSTRUCTIONS
1)  Continue to wear Yandy CGM for monitoring for hypoglycemia.    2)  Treatment of hypoglycemia with oral carbohydate and mini-doses of glucagon as needed.  Needs to get the 'old fashioned' glucagon kit in order to do mini dosing.   increase S/D standing balance by 1/2 grade by discharge to promote safety awareness and decrease risk of falling.

## 2024-09-25 ENCOUNTER — VIRTUAL VISIT (OUTPATIENT)
Dept: GASTROENTEROLOGY | Facility: CLINIC | Age: 59
End: 2024-09-25
Payer: COMMERCIAL

## 2024-09-25 VITALS — BODY MASS INDEX: 21.13 KG/M2 | WEIGHT: 139 LBS

## 2024-09-25 DIAGNOSIS — Z94.83 STATUS POST PANCREAS TRANSPLANTATION (H): ICD-10-CM

## 2024-09-25 DIAGNOSIS — K59.00 CONSTIPATION, UNSPECIFIED CONSTIPATION TYPE: ICD-10-CM

## 2024-09-25 DIAGNOSIS — K31.84 GASTROPARESIS: Primary | ICD-10-CM

## 2024-09-25 DIAGNOSIS — Z93.4 SMALL BOWEL TUBE FEEDING (H): ICD-10-CM

## 2024-09-25 PROCEDURE — 99417 PROLNG OP E/M EACH 15 MIN: CPT | Mod: 95 | Performed by: INTERNAL MEDICINE

## 2024-09-25 PROCEDURE — 99215 OFFICE O/P EST HI 40 MIN: CPT | Mod: 95 | Performed by: INTERNAL MEDICINE

## 2024-09-25 ASSESSMENT — PAIN SCALES - GENERAL: PAINLEVEL: SEVERE PAIN (6)

## 2024-09-25 NOTE — LETTER
9/25/2024      Dinora Mcghee  [address] [facility] [address]      Dear Colleague,    Thank you for referring your patient, Dinora Mcghee, to the [facility] [place]. Please see a copy of my visit note below.    Virtual Visit Details    Type of service:  Video Visit     Video Start Time: 8:07 a.m.  Video End Time: 9:09 a.m.  Originating Location (pt. Location): Home  Distant Location (provider location):  On-site  Platform used for Video Visit: Emeli    ------------------------------  GI CLINIC VISIT    CC:  follow-up      ASSESSMENT/PLAN:    (K31.84) Gastroparesis  (K59.00) Constipation, unspecified constipation type  (Z93.4) Small bowel tube feeding (H)  (Z94.83) Status post pancreas transplantation (H)      As per my prior documentation, gastroparesis, with marked nausea/vomiting, pain, inability to tolerate po intake. Unresponsive to medications (promotility agents, domperidone, antiemetic regimen, etc). Use of Motegrity did not seem to improve much in the way of upper GI symptoms; Continues with G-tube for venting (currently near continuous). With surgical J-tube for TFs which she is generally tolerating. Is on additional TPN, though now down to just 3 days a week. Having small amounts of food (~ 300 kevin) daily.    Continues with antiemetics as outlined below. No response to meclizine. Already using oral benadryl for allergies - ok to use prn for nausea as well. (Has not tried hydroxyzine).      For constipation/poor GI motility she is planned for colectomy with ileostomy in the coming weeks. Will work on titrating ibsrela as outlined below. Continue other bowel meds. Ok to hold on Trulance paperwork given upcoming surgery, will have to monitor post-op and see what, if any, bowel meds are needed after ostomy formation.      For GERD, hopeful that managing lower tract dysmotility may help with this. In the meantime will trial alginate for symptomatic periods and med  adjustments as below.     Noted improvement in bloating with avoiding lactose - would limit oral intake of lactose-containing dairy. Can consider trial of Lactaid.           - IBSrela - try a 1 pill (50 mcg)  in the morning and a 1/2 (25 mcg) pill at night, monitor response  - Continue Movantik, Miralax, and pink lady enemas as you are  - ok to stop meclizine if not helping  - continue IV promethazine and compazine suppositories  - continue oral compazine as needed  - ok for liquid benadryl as needed for nausea (12.5 to 25 mg dose)  - can trial Lactaid before oral lactose-containing dairy ingestion  - continue IV pantoprazole  - consider a short break from famotidine and then return to using it as needed  - try Gavison with sodium alginate as needed (Gaviscon Advance or Dual Action)  - best wishes for your upcoming surgery - keep us in the loop about what we can help with  - We will arrange a follow-up GI clinic visit sometime after your colorectal surgery post-op check           It was a pleasure to participate in the care of this patient; please contact us with any further questions.  A total of 62 video face-to-face minutes was spent with this patient, >50% of which was counseling regarding the above delineated issues. An additional 40 minutes was spent on the date of the encounter doing chart review, documentation, care coordination, and further activities as noted above.    Vijaya Genao MD   of Medicine  Division of Gastroenterology, Hepatology and Nutrition  [facility]    ---------------------------------------------------------------------------------------------------  HPI:    Ms. 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 She was initially seen in general GI clinic by this writer on 11/1/2017 and has been/ seen by myself and ANA Gan since that time. Her pancreas txplt surgeon was Dr. Phoenix. She also follows with Dr. Park of GI in [facility]. Her last visit with me was 6/5/24.        Today, she shares that a number of things are going well and she is trying to focus on those as she prepares for surgery (see below). She is happy her DM management is going very well. She did work with her PCP and is on an antidepressant now and thinks its helping some. She's focused a lot on mindfulness work. Ms. Loo shares that she recently updated her living will, sharing that it was hard to go over some of that information with her family, but she felt it was important.      She feels work with her pain clinic \"is going along\" and that she has many events to look forward to citing a local reception for her son who was recently , the marriage of the daughter of family friends in the coming weeks, and next summer her own daughter's wedding.       Ms. Loo was able to be seen at [facility] with Dr. Elmore for another opinion on next steps (see below) since her last visit with us.        History summarized and updated from previous GI documentation. Please see previous notes for further details      Gastroparesis, nausea and vomiting  Venting G-tube  Tube feeds via J-tube  Initially diagnosed years ago with 2-hour study gastric emptying study at [facility]. Repeat testing here with 4-hour GES on 6/27/2016 with 49% remaining at 4 hours (?on pain meds at the time).  This was addressed with prior GJ tube in fall 2016, NG tube used for venting. Ultimately, after TPIAT, she was able to wean off of TFs and return to a regular diet.  Patient did have some persisting nausea and vomiting which she treated " over the years with various medications including scopolamine patch, prochlorperazine, and promethazine. For GP in the past she had no response to metoclopramide. She has not been able to try erythromycin or azithromycin due to anaphylactic event with prior azithromycin use (in ED in 2008) She has also been seen by neurology and psychology to assist with insomnia and with migraines (and any contribution they may have to nausea).       Unfortunately, things acutely worsened in November 2020.  An ED evaluation at that time was unremarkable and CT only revealed moderate stool burden, nonspecific fluid in small intestine.  Theses episodes continued to recur.       Bowel regimen was adjusted to ensure adequate output. Dietary changes were made (freq, small meals to liquid-only) and support with antiemetics. Initially these dietary changes seemed to help, but over time the response waned. Another course of rifaximin (previously successful at addressing bloating and stool issues) was not helpful for her pain, bloating/fullness, and n/v. Amitriptyline was increased to 60 mg nightly with no change in discomfort.    From winter 2020 to May/Saba she lost about 16 lbs (from 156-->140 lbs). She continued to experience more pronounced post-prandial pain and fullness and had vomiting of food after eating. Work-up for alternate causes (with SBFT, CTE, CTA) was normal.       A GJ was placed in Sept 2021, but this was replaced a few times due to J-tube coiling in stomach as well as G-tube clogging/malfunction. On 10/19/21 she had placement of a weighted GJ but continued to struggle with discomfort at insertion site, clogging/venting issues. Ultimately a direct jejunostomy was done and after struggling with this for awhile it was removed. She worked hard to return to an oral diet.      By summer 2022, Ms. Loo had only a G-tube for venting.  At that time she was tolerating one small plate/1 cup/1 bowl of food without vomiting, 70%  of the time. She would have 5-6 of these servings a day (~6819-1019 Kcal). Ms. Loo also had regular protein supplement drinks and hydration drinks. Her weight stabilized around 140 lbs. For nausea, she continued on her chronic scopolamine patch as well as prochlorperazine and promethazine about every 6-8 hours.       Later in 2022 she had progressive decline in her ability to tolerate oral intake and continued to lose weight. She was initiated on domperidone by Dr. Mandeep Park, but did not have response even with up-titration of dosing. Small bowel feeds were discussed and her G (KISHORE-KEY) was replaced with a G-J a couple times, but had issues with J-tube extension coiling. Ultimately on 12/30/22, Dr. Bauer placed a direct J in the OR; extensive BAUDILIO and a resection of 15 cm of SB with dense adhesions was also done. TFs were started and continued in earnest from that point on.      In Jan 2023, unfortunately a perforation occurred during G and J tube exchange. She went to the OR that day. Jejunal perforation was repaired with bowel resection and additional BAUDILIO was performed. She was hospitalized thereafter and TFs were restarted.      Through early 2023, she felt better on just jejunal feeds with reduction in emesis, though still experienced nausea  for which she took: 1 scopolamine patch daily, IV promethazine once daily with J-tube promethazine at night, and compazine 10 mg - once daily. Her TFs were running at 45 mL for 18 hrs a day with oral intake is just bites of food for pleasure. She continued to vent her G-tube frequently - mostly seeing clear, colored liquid - no TFs. She continued on Relizorb digestive enzyme cartridge and did an IVF bolus via port each day.     In fall 2023,  Ms. Loo reported an ED presentation due to symptoms concerning for SBO, though imaging was unremarkable. She stated she had two hours of diarrhea after leaving the ED and felt markedly better. Around that time, she was  vomiting about 5 times a week. This improved with her doing 24/7 venting. In addition to her chronic antiemetics, she started buprenorphine patch and medical MJ which were helpful. She continued on J-tube feeds (for all nutritional needs, was not tolerating things by mouth).      By early 2024, nausea and emesis have continued to impact any oral intake. Jejunal feeds continued (see constipation below) and symptoms worsened when her bowels were backed up (see constipation below).      In March 2024, emesis was a bit better though persisting. She had nausea each day, though vomiting was only about 3 days a week.    For TFs she was getting in 4 cans/day, 1500 kcal a day, running at 90 mL/hr for 11 hours, eating a small amount. She is worried she is losing weight again. At that visit we discussed altering TF goals with [facility] team.      At her last visit, Ms. Loo had further increased TFs up to 120 mL/hr for 10-12 hours a day. She was tolerating some bites of food and on a good day a small bowl of ice cream or Cream of Wheat. There are days when oral intake does not go as well and she recalls an episode where she vomited yogurt and blueberries that she'd had 7 hours earlier. Weight is stable, but not gaining as she'd like.     Today, she shares she is pleased that weight has been stable around 140-150. Her TF formula was switched to Amy GetJar a few months ago and she is tolerating this better. Her current oral intake is around 300 calories/day, generally having something liquid/soft in the evening ( I e mashed potatoes). TPN has decreased down to 3 days a week. She occasionally vomits up what she ate hours later. If she does not vomit, she will experience marked bloating.     For her current regimen she continues with IV promethazine and compazine suppositories and finds them helpful. She also continues with scopolamine patch. The tablet form of compazine sometimes works as well and she uses this more as  a prn when she is out and about. Dr. [facility]more at [facility] did start her on meclizine. Ms. Loo has not found it useful and reports significant drowsiness with it's use. She has more regularly been taking benadryl for tape allergy issues and has found it useful for nausea at times.   Emend was also ordered by Dr Elmore, but declined by her insurance company.     She did have some G-tube issues in July 2024 which required management by our advanced endoscopy team. The G-tube was out for a short time, though a new one was soon after placed due to need for chronic venting. She is venting most of the day (but G-tube is closed for an hour or so after taking pills). This continues to help address her bloating.          Constipation/irregular bowel movements/bloating  Patient reports history of ongoing constipation for over 20 years after having her first child.  She has tried multiple interventions including regular bowel cleanouts, MiraLAX, senna, milk of magnesium, Linzess, and Amitiza for which she was on when she first came to  GI clinic.  Note, patient has been on Creon in the past (currently on Relizorb digestive enzyme cartridge).    Amitiza had worked for awhile then lost effectiveness. She was trialed on Trulance but reported frequent loose stools in the initial days (including nocturnal stooling and incontinence) which prompted her to stop. Trial of rifaximin in the past with with improvement in bloating and stool consistency though, as stated above, a re-trial for her pain, n/v, and fullness was not successful.    Motegrity was then started with some success, though noted HAs. We discussed a possible switch in her regimen to address this, though she was most comfortable with continuing Motegrity and monitoring her symptoms.  Previously discussed combining daily constipation medications; noted patient reports cost for Amitiza was quite a lot  (running $1000+/month).     By 2022, she was on Motegrity daily  "as well as Miralax 1 capful BID, 4 senna a day, 1000 mg Mag citrate daily, and stool softeners. With this she would have no BMs for 3-4 days (though has gone anywhere from 1-8 days), then have a day of emptying where she has multiple BMs over a day. These were often \"enormous\" and she felt empty when complete. Noted her bowel pattern is complicated by her need for antiemetics. When she is \"backed up\" she will experience early satiety.       By late 2022, she felt BMs were improved with restart of TFs. She stopped Motegrity/prucalopride around 12/30/2022. She was taking only 2.5 mL of liquid senna BID (total 8.8 mg daily).  With this Ms. Loo, was moving her bowels 3-4 days out of a week. On the days she moved her bowels it was typically more than once (2-3 times). Stool consistency was soft, \"ribbon-like\" stools. She denied straining. When asked about HAs she did feel these were better - unclear if this is because she went off Motegrity or if due to treatment of migraines  (received Botox injections.)     In fall 2023, she's restarted Motegrity, Senna twice daily (5 mL) with Miralax prn. She continued on amitriptyline (discomfort). With this she had a BM 5 out of 7 days , nearly always in the morning. She may have a few more  BMs later in the day, though she's done by early afternoon. Stools were often formed, smooth, and \"sticky\", occasionally loose.  She continued on Relizorb.     In early 2024,  Ms. Loo had increasing nausea, abdominal pain, and decreased stool output. Please see LUBB-TEX messages and telephone encounters for further details. Acute CT imaging did not reveal an SBO or other pathology. Home bowel regimen was not particularly successful. She ended up getting a soapsuds enema at her local ED with results and some improved symptoms. We pursued a therapeutic gastrograffin enema and aggressive bowel regimen to ensure colonic emptying to address worsening pain, discomfort, and nausea.  We then " adjusted her bowel regimen with plan for periodic GGE and consideration of adding Movantik (to be discussed with pain team as well).       In March 2024, she continued with pelvic floor PT and was feeling a little more in sync with biofeedback. She continued home PT exercises and noted some improvement with bladder control too.   Bowel pattern was: BMs on 5/7 days, about 4 of the 7 days had good amount of stool output. She has been doing enemas about every 2-3 days (just had two days without enemas, then did one today with very large output.). Stools are all loose.      At her last visit,  Ms. Loo continued on Motegrity and had been started Movantik 60 mg through pain clinic. She continued on sennosides 8.8 mg BID and was doing pink lady enemas about 3-4 times a week choosing to administer one when she feel uncomfortable. Afterwards she will pass a palm-size stool - ice cream consistency with a lot of pink water. She passes a lot of flatus as well.  She has some relief of discomfort after evacuating and passing this gas, though she doesn't feel cramping and bloating improve totally.         She was able to see Dr. Branch of colorectal surgery for discussion of potential surgical options. Total abdominal colectomy with likely ileostomy (known pelvic floor dysfunction) was reviewed though possible ileorectal anastomosis diverting loop ileostomy was discussed.  She did feel it was overwhelming to have to think about surgery given what's she's previously gone through and the various options. She saw our ostomy nurse for an informational session.   We had discussed seeking another opinion at a [facility] at her last visit.     Today, Ms. Loo was able to be seen at [facility] by Dr. Elmore on 7/30/24. A work-up was undertaken for autoimmune dysmotility which was reportedly normal. Ibsrela was added to her regimen and a restart of Trulance was attempted as well (though insurance has not yet covered  this medication so she has not started it). She states colectomy was recommended at [facility] as well and Ms. Loo reports she met with a colorectal surgeon there, though I could not see any documentation from that visit. She expresses that the [place] team felt that many of her upper GI symptoms were driven by poor lower GI dysmotility and may improve with colectomy. She states she was told that gastroparesis is not the primary issue and this resonated with her. Ultimately she did reach out to Dr. Barnch and is now planned to move forward with a total abdominal colectomy with ileostomy in a couple weeks.     Her current bowel regimen is:  Ibsrela 250 mcg BID --> marked diarrhea so now --> cut down to 125 mcg BID and BMs are now infrequent  Is off of Motegrity now.  Continues on Movantik still.  Taking Miralax 1 capful BID.  Using pink lady enemas as needed - about three times a week. Still feels like gets a good response most times.      GERD, Dysphagia   Summarized/taken from prior documentation  Patient experiences GERD as substernal and throat burning with nocturnal relaxant causing choking. She reports a prior Schatzki's ring requiring previous dilation. Manometry in 2015 was normal, and pH impedence at that time had a DeMeester of 24 with positive symptoms association. Patient has treated GERD symptoms with some combination of BID PPI, Carafate, H2 blocker, and Tums in the past. She'd had issues with access to liquid PPI due to insurance and liquid famotidine had not be sufficient for symptoms. Omeprazole by mouth seemed to never pass the stomach/get absorbed and would come out the G-tube when vented even when clamped for an hour or so (clamping longer led to more severe pain and symptoms). IV pantoprazole was ultimately started (issues digesting the pill).      Today, she feels this is a big issue. She is noting some emesis at night - wonders if this is more bringing up of refluxant as  "opposed to emesis of any oral intake from the evening. What she brings up is liquid and \"definitely burns\" She is sleeping upright but this has not stopped the episodes. The pyrosis is present through the morning, then resolved by the afternoon. She is still on IV pantoprazole and famotidine every morning.  Sometimes taking Tums helps in the moment. Pepto-Bismol doesn't really improve things.         Pain:   In regards to pain she had followed initially in our pain clinic for management of bulging disk as well as chronic abdominal pain for which she previously had local injections. Ms. Loo shares that much of her abdominal pain is a lot better after BAUDILIO and her first SB resection.  She was off of all pain medications except Tylenol and methycarbomal (for back pain) until her surgery and hospitalization for management of her perforation. She's continued to have pain at both her G and J-tube site.      She follows with [facility] at this time. She is enrolled in spinal cord stimulator study and hoping thi helps with pain. She was told this may have some impact on her nausea as well.      In March 2024, she had a has a few sites that are bothering her:  G-tube site: sharp pain at site  Pelvis: intermittent pelvic pain described like \"labor pain\"  PICC line: hurts with dressing/tape change as well as injections  Chronic abd pain: continues on pain meds, baclofen which brings this pain down to a 3/10      -We'd started amitriptyline and tried to address some of the chronic discomfort.     Today, not discussed.       PROBLEM LIST  Patient Active Problem List    Diagnosis Date Noted     Constipation 07/23/2024     Priority: Medium     Right upper quadrant abdominal pain 07/23/2024     Priority: Medium     S/P pancreatic islet cell transplantation (H) 07/23/2024     Priority: Medium     Bacteremia 11/10/2023     Priority: Medium     Abdominal pain, unspecified abdominal location 11/09/2023     Priority: Medium     " Cholangitis (H28) 06/07/2023     Priority: Medium     On total parenteral nutrition (TPN) 06/04/2023     Priority: Medium     Fever, unspecified fever cause 06/04/2023     Priority: Medium     Chronic pancreatitis, unspecified pancreatitis type (H) 06/04/2023     Priority: Medium     History of food intolerance 05/09/2023     Priority: Medium     Malnutrition, unspecified type (H24) 05/09/2023     Priority: Medium     Nausea and vomiting, unspecified vomiting type 05/09/2023     Priority: Medium     Major depression, single episode 03/06/2023     Priority: Medium     Acute postoperative abdominal pain 01/31/2023     Priority: Medium     Feeding intolerance 12/30/2022     Priority: Medium     Myofascial pain 10/14/2022     Priority: Medium     Added automatically from request for surgery [id number]       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  pancreas and spleen removed, islet cells transplanted into liver    3 classes of vaccines needed .    1. Pneumococcal vaccines are as follows:       PCV 13 - one time dose (was given 2017)       PPSV23 - (given 12/1/16); repeat q 5 years      ROLE of PCV 20???    2. Meningococcal vaccines     Menactra - (last given 12/1/16) -  repeat q 5 yrs   NOTE: need to wait 4 wks after PCV 13 has been given.   OR - give Menveo instead as it can be given same time as PCV 13    AND   Bexcero - (got 12/9/16) - does not need to be repeated.     3. The Hib vaccine - (got 12/9/16) - does not need to be repeated.       Poisoning by drug 05/09/2022     Priority: Medium     Formatting of this note might be different from the original.  Per Care Everywhere review:  All oral, IV and injectable steroids are contraindicated (unless in life threatening situations) in Islet Auto transplant recipients. They can cause irreversible loss of islet cell function. Please contact patient's transplant care coordinator ADI Bose  Gulshan RN at [phone]/pager [phone] and/or endocrinologist prior to administration.       Type 1 diabetes mellitus without complication (H) 05/09/2022     Priority: Medium     Formatting of this note might be different from the original.  ACTUALLY - DM 3c - pathogenic diabetes as no pancreas.  (pancreas and spleen removed, islet cells transplanted into liver)    Seeing ENDO at [facility] - Dr Erum Melissa       Intra-abdominal abscess (H) 12/03/2021     Priority: Medium     Postprocedural intraabdominal abscess 12/03/2021     Priority: Medium     Gastrostomy tube obstruction (H) 11/27/2021     Priority: Medium     Abdominal pain, generalized 09/18/2021     Priority: Medium     Adult failure to thrive 08/16/2021     Priority: Medium     Added automatically from request for surgery [id number]       Hypoglycemia 08/05/2021     Priority: Medium     Iron deficiency anemia 03/22/2019     Priority: Medium     Headache, chronic migraine without aura 09/18/2018     Priority: Medium     Chronic pain syndrome 09/18/2018     Priority: Medium     S/P hernia repair 08/23/2018     Priority: Medium     Incisional hernia, without obstruction or gangrene 05/20/2018     Priority: Medium     Adhesive capsulitis of shoulder, unspecified laterality 11/14/2017     Priority: Medium     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  Excess IgG4  [facility]       Gastroparesis      Priority: Medium     ACP (advance care planning) 03/28/2017     Priority: Medium     Advance Care Planning 3/28/2017: Receipt of ACP document:  Received: Health Care Directive which was witnessed or notarized on 12/8/16.  Document previously scanned on 1/12/17.  Validation form completed and scanned.  Code Status reflects choices in most recent ACP document..  Confirmed/documented  designated decision maker(s).  Added by May Baires   Advance Care Planning Liaison               Pancreatic insufficiency 01/17/2017     Priority: Medium     Diabetes insipidus (H24) 01/05/2017     Priority: Medium     Formatting of this note might be different from the original.  Diabetes Insipidus (DI)  Per external records.  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  Depression NOS       Other type of intractable migraine      Priority: Medium     Diagnosis updated by automated process. Provider to review and confirm.       GERD (gastroesophageal reflux disease) 12/01/2010     Priority: Medium     Lumbago 04/18/2005     Priority: Medium     History of L5-S1 degenerative disk disease.         Sensorineural hearing loss 01/10/2005     Priority: Medium     Problem list name updated by automated process. Provider to review       Vertiginous syndrome and labyrinthine disorder 01/10/2005     Priority: Medium     Problem list name updated by automated process. Provider to review  IMO Regulatory Load OCT 2020       Sprain and strain of other specified sites of hip and thigh 12/09/2002     Priority: Medium     Chondromalacia of patella 12/09/2002     Priority: Medium     Need for prophylactic immunotherapy      Priority: Medium     trees, grass, srw, dust mites, cat       Allergic rhinitis due to other allergen 12/21/2001     Priority: Medium     Hypothyroidism      Priority: Medium     Problem list name updated by automated process. Provider to review         PERTINENT MEDICATIONS:  Current Outpatient Medications    Medication Sig Dispense Refill     acetaminophen (TYLENOL) 325 MG/10.15ML solution 20.3 mLs (650 mg) by Per J Tube route every 6 hours as needed for mild pain or fever 473 mL 4     baclofen (LIORESAL) 10 MG tablet 1-2 tablets (10-20 mg) by Per G Tube route 3 times daily 60 tablet 1     buprenorphine (SUBUTEX) 2 MG SUBL sublingual tablet Place 2 mg under the tongue 2 times daily       cetirizine (ZYRTEC) 10 MG tablet 1 tablet (10 mg) by Per G Tube route 2 times daily 60 tablet 11     COMPOUNDED NON-CONTROLLED SUBSTANCE (CMPD RX) - PHARMACY TO MIX COMPOUNDED MEDICATION Administer 60 mg amitriptyline 2 mg/ml via G-J tube at bedtime 900 mL 3     COMPOUNDED NON-CONTROLLED SUBSTANCE (CMPD RX) - PHARMACY TO MIX COMPOUNDED MEDICATION Administer 40 mg amitriptyline suspension (2 mg/mL) via G-J tube at bedtime. 600 mL 5     COMPOUNDED NON-CONTROLLED SUBSTANCE (CMPD RX) - PHARMACY TO MIX COMPOUNDED MEDICATION Place 1 enema rectally 2 times daily as needed (for constipation) 60 each 3     Continuous Blood Gluc Sensor (FREESTYLE LISA 3 SENSOR) MISC CHANGE EVERY 14 DAYS 2 each 11     Digestive Enzyme Cartridge (RELIZORB) MARCO 2 Cartridges daily 60 each 6     diphenhydrAMINE (BENADRYL) 12.5 MG/5ML solution Take 25 mg by mouth 4 times daily as needed for other (nausea)       DULoxetine HCl 60 MG CSDR 1 capsule (60 mg) by Per J Tube route daily Sprinkle caps for feeding tube 90 capsule 3     estradiol (VAGIFEM) 10 MCG TABS vaginal tablet INSERT 1 TABLET(10 MCG) VAGINALLY 2 TIMES A WEEK 24 tablet 2     famotidine (PEPCID) 40 MG/5ML suspension 2.5 mLs (20 mg) by Per J Tube route daily 50 mL 4     glucagon 1 MG kit Give 0.1 to 0.15mg( 10-15 units on Insulin sryinge) subcutaneous  every 15 minutes PRN for hypoglycemia. Remix new kit q24hr. Needs up to 3 kit/week. 10 each 3     glucose 40 % GEL gel Take 15-30 g by mouth every 15 minutes as needed for low blood sugar 3 Tube 2     ibuprofen (ADVIL/MOTRIN) 400 MG tablet take 30 mls  by  "oral route  every 4 - 6 hours as needed       insulin aspart (NOVOLOG FLEXPEN) 100 UNIT/ML pen Take 1 unit if BG is 175-275, and 2 units if >275 up to every 4 hours as needed.  \"Prime\" pen needle with 2 unit airshot before giving, needs supply for 15 unit/day. 15 mL 11     insulin aspart (NOVOLOG PEN) 100 UNIT/ML pen Take 1 unit if BG is 175-275, and 2 units if >275 up to every 4 hours as needed.  \"Prime\" pen needle with 2 unit airshot before giving, needs supply for 15 unit/day. 15 mL 11     insulin detemir (LEVEMIR PEN) 100 UNIT/ML pen Take 2 units daily; use 2 unit priming; approximate daily maximum use 6 units (Patient taking differently: Inject 3 Units subcutaneously at bedtime. Takes when starting TPN infusion)       insulin syringe-needle U-100 (30G X 1/2\" 0.3 ML) 30G X 1/2\" 0.3 ML miscellaneous Use 3 syringes daily or as directed. 100 each 11     lactated ringers infusion Inject 1,000 mLs into the vein daily as needed       levothyroxine (SYNTHROID) 25 mcg/mL SUSP Place 6 mLs (150 mcg) into J tube daily. 540 mL 3     lidocaine (LIDODERM) 5 % patch Place onto the skin every 24 hours To prevent lidocaine toxicity, patient should be patch free for 12 hrs daily.       lipase-protease-amylase (CREON) 18623-35731-823376 units CPEP per EC capsule Take 3-4 capsules by mouth 3 times daily (with meals) With oral meals 150 capsule 11     methocarbamol (ROBAXIN) 750 MG tablet Take 1 tablet (750 mg) by mouth 4 times daily as needed for muscle spasms 120 tablet 11     montelukast (SINGULAIR) 10 MG tablet Take by mouth every 24 hours       MOVANTIK 25 MG TABS tablet Take 25 mg by mouth every morning (before breakfast).       Nutritional Supplements (BOOST HIGH PROTEIN) LIQD After above baseline labs are drawn, give: 6 mL/kg to maximum of 360 mL; the beverage is to be consumed within 5 minutes.  0     pantoprazole (PROTONIX) 40 mg IV push injection Inject 40 mg into the vein daily (with breakfast) 30 each 11     parenteral " "nutrition - PTA/DISCHARGE ORDER The TPN formula will print on the After Visit Summary Report. 1 each 0     polyethylene glycol (MIRALAX) 17 GM/Dose powder Please take 238 grams mixed with 64 oz of Gatorade at 4pm the night before surgery 238 g 0     prochlorperazine (COMPAZINE) 10 MG tablet Take 1 tablet (10 mg) by mouth every 6 hours as needed for nausea or vomiting 120 tablet 3     prochlorperazine (COMPRO) 25 MG suppository Place 1 suppository (25 mg) rectally every 12 hours as needed for nausea 10 suppository 2     promethazine 25 mg Inject 25 mg into the vein 3 times daily       promethazine-phenylephrine (PROMETHAZINE VC) 6.25-5 MG/5ML syrup Take 5 mLs by mouth every 6 hours       rimegepant (NURTEC) 75 MG ODT tablet Place 1 tablet (75 mg) under the tongue daily as needed for migraine Maximum of 1 tablet every 24 hours. 8 tablet 11     scopolamine (TRANSDERM) 1 MG/3DAYS 72 hr patch Place 1 patch onto the skin every 72 hours - Transdermal 10 patch 11     sennosides (SENOKOT) 8.8 MG/5ML syrup 5 mLs by Per J Tube route 2 times daily 236 mL 11     Sharps Container (BD SHARPS ) MISC 1 Container as needed 1 each 1     silver nitrate (ARZOL SILVER NIT APPLICATORS) 75-25 % miscellaneous Apply topically as needed for wound care Apply Silver Nitrate Sticks every 2-3 days until granulation tissue resolved.  \"Roll\" tip of Silver Nitrate Q tip directly onto the granulation tissue-bulging tissue area.  Have Agency or Home Care Nurse administer this, if you prefer.  Take care not to touch any healthy tissue or skin.  The tissue will turn white and eventually black & scab off.  May repeat if needed in the future for recurrence. 30 applicator 0     sodium phosphate, mineral oil, magnesium citrate enema Instill 1 pink 187 ml enema twice daily as needed for constipation       STATIN NOT PRESCRIBED (INTENTIONAL) Previous liver issues, risks vs benefits felt to not warrant statin.    Discussed Oct 2022 visit       SYNDROS " 5 MG/ML oral soln TAKE 0.5 ML BY MOUTH 2 TIMES DAILY 60 mL 0     zinc oxide - white petrolatum (CRITIC-AID THICK MOIST BARRIER) 20-51% PSTE topical paste Apply 71 g topically every hour as needed for rash (Under J tube bumper when needed for skin protection) 170 g 0     ZOLMitriptan (ZOMIG-ZMT) 5 MG ODT Take 1 tablet (5 mg) by mouth at onset of headache for migraine Dissolve tablet in the mouth. May repeat in 2 hours. Max 2 tablets/24 hours. 12 tablet 6     Current Facility-Administered Medications   Medication Dose Route Frequency Provider Last Rate Last Admin     Botulinum Toxin Type A (BOTOX) 200 units injection 155 Units  155 Units Intramuscular See Admin Instructions    155 Units at 08/15/24 1731     Botulinum Toxin Type A (BOTOX) 200 units injection 155 Units  155 Units Intramuscular See Admin Instructions Rachelle العلي, NATALY CNP   155 Units at 05/23/24 1703         PHYSICAL EXAMINATION:  Vitals Wt 63 kg (139 lb)   BMI 21.13 kg/m     Wt   Wt Readings from Last 2 Encounters:   09/25/24 63 kg (139 lb)   09/11/24 63.5 kg (139 lb 15.9 oz)      Gen: Pt sitting up in NAD, interactive and cooperative on exam  Eyes: sclerae anicteric, no injection  ENT:  MMM  Resp: Breathing comfortably on exam, speaking in full sentences  Skin: No jaundice  Neuro: alert, oriented, answers questions appropriately        PERTINENT STUDIES:    Ancillary Procedure on 09/04/2024   Component Date Value Ref Range Status     Creatinine POCT 09/04/2024 0.6  0.5 - 1.0 mg/dL Final     GFR, ESTIMATED POCT 09/04/2024 >60  >60 mL/min/1.73m2 Final         Again, thank you for allowing me to participate in the care of your patient.        Sincerely,        Vijaya Genao MD

## 2024-09-25 NOTE — PROGRESS NOTES
Virtual Visit Details    Type of service:  Video Visit     Video Start Time: 8:07 a.m.  Video End Time: 9:09 a.m.  Originating Location (pt. Location): Home  Distant Location (provider location):  On-site  Platform used for Video Visit: Emeli    ------------------------------  GI CLINIC VISIT    CC:  follow-up      ASSESSMENT/PLAN:    (K31.84) Gastroparesis  (K59.00) Constipation, unspecified constipation type  (Z93.4) Small bowel tube feeding (H)  (Z94.83) Status post pancreas transplantation (H)      As per my prior documentation, gastroparesis, with marked nausea/vomiting, pain, inability to tolerate po intake. Unresponsive to medications (promotility agents, domperidone, antiemetic regimen, etc). Use of Motegrity did not seem to improve much in the way of upper GI symptoms; Continues with G-tube for venting (currently near continuous). With surgical J-tube for TFs which she is generally tolerating. Is on additional TPN, though now down to just 3 days a week. Having small amounts of food (~ 300 kevin) daily.    Continues with antiemetics as outlined below. No response to meclizine. Already using oral benadryl for allergies - ok to use prn for nausea as well. (Has not tried hydroxyzine).      For constipation/poor GI motility she is planned for colectomy with ileostomy in the coming weeks. Will work on titrating ibsrela as outlined below. Continue other bowel meds. Ok to hold on Trulance paperwork given upcoming surgery, will have to monitor post-op and see what, if any, bowel meds are needed after ostomy formation.      For GERD, hopeful that managing lower tract dysmotility may help with this. In the meantime will trial alginate for symptomatic periods and med adjustments as below.     Noted improvement in bloating with avoiding lactose - would limit oral intake of lactose-containing dairy. Can consider trial of Lactaid.           - IBSrela - try a 1 pill (50 mcg)  in the morning and a 1/2 (25 mcg) pill at night,  monitor response  - Continue Movantik, Miralax, and pink lady enemas as you are  - ok to stop meclizine if not helping  - continue IV promethazine and compazine suppositories  - continue oral compazine as needed  - ok for liquid benadryl as needed for nausea (12.5 to 25 mg dose)  - can trial Lactaid before oral lactose-containing dairy ingestion  - continue IV pantoprazole  - consider a short break from famotidine and then return to using it as needed  - try Gavison with sodium alginate as needed (Gaviscon Advance or Dual Action)  - best wishes for your upcoming surgery - keep us in the loop about what we can help with  - We will arrange a follow-up GI clinic visit sometime after your colorectal surgery post-op check           It was a pleasure to participate in the care of this patient; please contact us with any further questions.  A total of 62 video face-to-face minutes was spent with this patient, >50% of which was counseling regarding the above delineated issues. An additional 40 minutes was spent on the date of the encounter doing chart review, documentation, care coordination, and further activities as noted above.    Vijaya Genao MD   of Medicine  Division of Gastroenterology, Hepatology and Nutrition  [facility]    ---------------------------------------------------------------------------------------------------  HPI:    Ms. 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 She was initially seen in general GI clinic by this writer on 11/1/2017 and has been/ seen by myself and ANA Gan since that time.  "Her pancreas txplt surgeon was Dr. Phoenix. She also follows with Dr. Park of GI in [facility]. Her last visit with me was 6/5/24.        Today, she shares that a number of things are going well and she is trying to focus on those as she prepares for surgery (see below). She is happy her DM management is going very well. She did work with her PCP and is on an antidepressant now and thinks its helping some. She's focused a lot on mindfulness work. Ms. Loo shares that she recently updated her living will, sharing that it was hard to go over some of that information with her family, but she felt it was important.      She feels work with her pain clinic \"is going along\" and that she has many events to look forward to citing a local reception for her son who was recently , the marriage of the daughter of family friends in the coming weeks, and next summer her own daughter's wedding.       Ms. Loo was able to be seen at [facility] with Dr. Elmore for another opinion on next steps (see below) since her last visit with us.        History summarized and updated from previous GI documentation. Please see previous notes for further details      Gastroparesis, nausea and vomiting  Venting G-tube  Tube feeds via J-tube  Initially diagnosed years ago with 2-hour study gastric emptying study at [facility]. Repeat testing here with 4-hour GES on 6/27/2016 with 49% remaining at 4 hours (?on pain meds at the time).  This was addressed with prior GJ tube in fall 2016, NG tube used for venting. Ultimately, after TPIAT, she was able to wean off of TFs and return to a regular diet.  Patient did have some persisting nausea and vomiting which she treated over the years with various medications including scopolamine patch, prochlorperazine, and promethazine. For GP in the past she had no response to metoclopramide. She has not been able to try erythromycin or azithromycin due to anaphylactic event with " prior azithromycin use (in ED in 2008) She has also been seen by neurology and psychology to assist with insomnia and with migraines (and any contribution they may have to nausea).       Unfortunately, things acutely worsened in November 2020.  An ED evaluation at that time was unremarkable and CT only revealed moderate stool burden, nonspecific fluid in small intestine.  Theses episodes continued to recur.       Bowel regimen was adjusted to ensure adequate output. Dietary changes were made (freq, small meals to liquid-only) and support with antiemetics. Initially these dietary changes seemed to help, but over time the response waned. Another course of rifaximin (previously successful at addressing bloating and stool issues) was not helpful for her pain, bloating/fullness, and n/v. Amitriptyline was increased to 60 mg nightly with no change in discomfort.    From winter 2020 to May/Saba she lost about 16 lbs (from 156-->140 lbs). She continued to experience more pronounced post-prandial pain and fullness and had vomiting of food after eating. Work-up for alternate causes (with SBFT, CTE, CTA) was normal.       A GJ was placed in Sept 2021, but this was replaced a few times due to J-tube coiling in stomach as well as G-tube clogging/malfunction. On 10/19/21 she had placement of a weighted GJ but continued to struggle with discomfort at insertion site, clogging/venting issues. Ultimately a direct jejunostomy was done and after struggling with this for awhile it was removed. She worked hard to return to an oral diet.      By summer 2022, Ms. Loo had only a G-tube for venting.  At that time she was tolerating one small plate/1 cup/1 bowl of food without vomiting, 70% of the time. She would have 5-6 of these servings a day (~9439-6074 Kcal). Ms. Loo also had regular protein supplement drinks and hydration drinks. Her weight stabilized around 140 lbs. For nausea, she continued on her chronic scopolamine patch as  well as prochlorperazine and promethazine about every 6-8 hours.       Later in 2022 she had progressive decline in her ability to tolerate oral intake and continued to lose weight. She was initiated on domperidone by Dr. Mandeep Prak, but did not have response even with up-titration of dosing. Small bowel feeds were discussed and her G (KISHORE-KEY) was replaced with a G-J a couple times, but had issues with J-tube extension coiling. Ultimately on 12/30/22, Dr. Bauer placed a direct J in the OR; extensive BAUDILIO and a resection of 15 cm of SB with dense adhesions was also done. TFs were started and continued in earnest from that point on.      In Jan 2023, unfortunately a perforation occurred during G and J tube exchange. She went to the OR that day. Jejunal perforation was repaired with bowel resection and additional BAUDILIO was performed. She was hospitalized thereafter and TFs were restarted.      Through early 2023, she felt better on just jejunal feeds with reduction in emesis, though still experienced nausea  for which she took: 1 scopolamine patch daily, IV promethazine once daily with J-tube promethazine at night, and compazine 10 mg - once daily. Her TFs were running at 45 mL for 18 hrs a day with oral intake is just bites of food for pleasure. She continued to vent her G-tube frequently - mostly seeing clear, colored liquid - no TFs. She continued on Relizorb digestive enzyme cartridge and did an IVF bolus via port each day.     In fall 2023,  Ms. Loo reported an ED presentation due to symptoms concerning for SBO, though imaging was unremarkable. She stated she had two hours of diarrhea after leaving the ED and felt markedly better. Around that time, she was vomiting about 5 times a week. This improved with her doing 24/7 venting. In addition to her chronic antiemetics, she started buprenorphine patch and medical MJ which were helpful. She continued on J-tube feeds (for all nutritional needs, was not  tolerating things by mouth).      By early 2024, nausea and emesis have continued to impact any oral intake. Jejunal feeds continued (see constipation below) and symptoms worsened when her bowels were backed up (see constipation below).      In March 2024, emesis was a bit better though persisting. She had nausea each day, though vomiting was only about 3 days a week.    For TFs she was getting in 4 cans/day, 1500 kcal a day, running at 90 mL/hr for 11 hours, eating a small amount. She is worried she is losing weight again. At that visit we discussed altering TF goals with [facility] team.      At her last visit, Ms. Loo had further increased TFs up to 120 mL/hr for 10-12 hours a day. She was tolerating some bites of food and on a good day a small bowl of ice cream or Cream of Wheat. There are days when oral intake does not go as well and she recalls an episode where she vomited yogurt and blueberries that she'd had 7 hours earlier. Weight is stable, but not gaining as she'd like.     Today, she shares she is pleased that weight has been stable around 140-150. Her TF formula was switched to [employer] a few months ago and she is tolerating this better. Her current oral intake is around 300 calories/day, generally having something liquid/soft in the evening ( I e mashed potatoes). TPN has decreased down to 3 days a week. She occasionally vomits up what she ate hours later. If she does not vomit, she will experience marked bloating.     For her current regimen she continues with IV promethazine and compazine suppositories and finds them helpful. She also continues with scopolamine patch. The tablet form of compazine sometimes works as well and she uses this more as a prn when she is out and about. Dr. Elmore at [facility] did start her on meclizine. Ms. Loo has not found it useful and reports significant drowsiness with it's use. She has more regularly been taking benadryl for tape allergy issues  and has found it useful for nausea at times.   Emend was also ordered by Dr Elmore, but declined by her insurance company.     She did have some G-tube issues in July 2024 which required management by our advanced endoscopy team. The G-tube was out for a short time, though a new one was soon after placed due to need for chronic venting. She is venting most of the day (but G-tube is closed for an hour or so after taking pills). This continues to help address her bloating.          Constipation/irregular bowel movements/bloating  Patient reports history of ongoing constipation for over 20 years after having her first child.  She has tried multiple interventions including regular bowel cleanouts, MiraLAX, senna, milk of magnesium, Linzess, and Amitiza for which she was on when she first came to  GI clinic.  Note, patient has been on Creon in the past (currently on Relizorb digestive enzyme cartridge).    Amitiza had worked for awhile then lost effectiveness. She was trialed on Trulance but reported frequent loose stools in the initial days (including nocturnal stooling and incontinence) which prompted her to stop. Trial of rifaximin in the past with with improvement in bloating and stool consistency though, as stated above, a re-trial for her pain, n/v, and fullness was not successful.    Motegrity was then started with some success, though noted HAs. We discussed a possible switch in her regimen to address this, though she was most comfortable with continuing Motegrity and monitoring her symptoms.  Previously discussed combining daily constipation medications; noted patient reports cost for Amitiza was quite a lot  (running $1000+/month).     By 2022, she was on Motegrity daily as well as Miralax 1 capful BID, 4 senna a day, 1000 mg Mag citrate daily, and stool softeners. With this she would have no BMs for 3-4 days (though has gone anywhere from 1-8 days), then have a day of emptying where she has multiple BMs over a  "day. These were often \"enormous\" and she felt empty when complete. Noted her bowel pattern is complicated by her need for antiemetics. When she is \"backed up\" she will experience early satiety.       By late 2022, she felt BMs were improved with restart of TFs. She stopped Motegrity/prucalopride around 12/30/2022. She was taking only 2.5 mL of liquid senna BID (total 8.8 mg daily).  With this Ms. Loo, was moving her bowels 3-4 days out of a week. On the days she moved her bowels it was typically more than once (2-3 times). Stool consistency was soft, \"ribbon-like\" stools. She denied straining. When asked about HAs she did feel these were better - unclear if this is because she went off Motegrity or if due to treatment of migraines  (received Botox injections.)     In fall 2023, she's restarted Motegrity, Senna twice daily (5 mL) with Miralax prn. She continued on amitriptyline (discomfort). With this she had a BM 5 out of 7 days , nearly always in the morning. She may have a few more  BMs later in the day, though she's done by early afternoon. Stools were often formed, smooth, and \"sticky\", occasionally loose.  She continued on Relizorb.     In early 2024,  Ms. Loo had increasing nausea, abdominal pain, and decreased stool output. Please see [employer] messages and telephone encounters for further details. Acute CT imaging did not reveal an SBO or other pathology. Home bowel regimen was not particularly successful. She ended up getting a soapsuds enema at her local ED with results and some improved symptoms. We pursued a therapeutic gastrograffin enema and aggressive bowel regimen to ensure colonic emptying to address worsening pain, discomfort, and nausea.  We then adjusted her bowel regimen with plan for periodic GGE and consideration of adding Movantik (to be discussed with pain team as well).       In March 2024, she continued with pelvic floor PT and was feeling a little more in sync with biofeedback. She " continued home PT exercises and noted some improvement with bladder control too.   Bowel pattern was: BMs on 5/7 days, about 4 of the 7 days had good amount of stool output. She has been doing enemas about every 2-3 days (just had two days without enemas, then did one today with very large output.). Stools are all loose.      At her last visit,  Ms. Loo continued on Motegrity and had been started Movantik 60 mg through pain clinic. She continued on sennosides 8.8 mg BID and was doing pink lady enemas about 3-4 times a week choosing to administer one when she feel uncomfortable. Afterwards she will pass a palm-size stool - ice cream consistency with a lot of pink water. She passes a lot of flatus as well.  She has some relief of discomfort after evacuating and passing this gas, though she doesn't feel cramping and bloating improve totally.         She was able to see Dr. Branch of colorectal surgery for discussion of potential surgical options. Total abdominal colectomy with likely ileostomy (known pelvic floor dysfunction) was reviewed though possible ileorectal anastomosis diverting loop ileostomy was discussed.  She did feel it was overwhelming to have to think about surgery given what's she's previously gone through and the various options. She saw our ostomy nurse for an informational session.   We had discussed seeking another opinion at a [facility] at her last visit.     Today, Ms. Loo was able to be seen at [facility] by Dr. Elmore on 7/30/24. A work-up was undertaken for autoimmune dysmotility which was reportedly normal. Ibsrela was added to her regimen and a restart of Trulance was attempted as well (though insurance has not yet covered this medication so she has not started it). She states colectomy was recommended at [facility] as well and Ms. Loo reports she met with a colorectal surgeon there, though I could not see any documentation from that visit. She expresses  "that the [place] team felt that many of her upper GI symptoms were driven by poor lower GI dysmotility and may improve with colectomy. She states she was told that gastroparesis is not the primary issue and this resonated with her. Ultimately she did reach out to Dr. Branch and is now planned to move forward with a total abdominal colectomy with ileostomy in a couple weeks.     Her current bowel regimen is:  Ibsrela 250 mcg BID --> marked diarrhea so now --> cut down to 125 mcg BID and BMs are now infrequent  Is off of Motegrity now.  Continues on Movantik still.  Taking Miralax 1 capful BID.  Using pink lady enemas as needed - about three times a week. Still feels like gets a good response most times.      GERD, Dysphagia   Summarized/taken from prior documentation  Patient experiences GERD as substernal and throat burning with nocturnal relaxant causing choking. She reports a prior Schatzki's ring requiring previous dilation. Manometry in 2015 was normal, and pH impedence at that time had a DeMeester of 24 with positive symptoms association. Patient has treated GERD symptoms with some combination of BID PPI, Carafate, H2 blocker, and Tums in the past. She'd had issues with access to liquid PPI due to insurance and liquid famotidine had not be sufficient for symptoms. Omeprazole by mouth seemed to never pass the stomach/get absorbed and would come out the G-tube when vented even when clamped for an hour or so (clamping longer led to more severe pain and symptoms). IV pantoprazole was ultimately started (issues digesting the pill).      Today, she feels this is a big issue. She is noting some emesis at night - wonders if this is more bringing up of refluxant as opposed to emesis of any oral intake from the evening. What she brings up is liquid and \"definitely burns\" She is sleeping upright but this has not stopped the episodes. The pyrosis is present through the morning, then resolved by the afternoon. She " "is still on IV pantoprazole and famotidine every morning.  Sometimes taking Tums helps in the moment. Pepto-Bismol doesn't really improve things.         Pain:   In regards to pain she had followed initially in our pain clinic for management of bulging disk as well as chronic abdominal pain for which she previously had local injections. Ms. Loo shares that much of her abdominal pain is a lot better after BAUDILIO and her first SB resection.  She was off of all pain medications except Tylenol and methycarbomal (for back pain) until her surgery and hospitalization for management of her perforation. She's continued to have pain at both her G and J-tube site.      She follows with [facility] at this time. She is enrolled in spinal cord stimulator study and hoping thi helps with pain. She was told this may have some impact on her nausea as well.      In March 2024, she had a has a few sites that are bothering her:  G-tube site: sharp pain at site  Pelvis: intermittent pelvic pain described like \"labor pain\"  PICC line: hurts with dressing/tape change as well as injections  Chronic abd pain: continues on pain meds, baclofen which brings this pain down to a 3/10      -We'd started amitriptyline and tried to address some of the chronic discomfort.     Today, not discussed.       PROBLEM LIST  Patient Active Problem List    Diagnosis Date Noted    Constipation 07/23/2024     Priority: Medium    Right upper quadrant abdominal pain 07/23/2024     Priority: Medium    S/P pancreatic islet cell transplantation (H) 07/23/2024     Priority: Medium    Bacteremia 11/10/2023     Priority: Medium    Abdominal pain, unspecified abdominal location 11/09/2023     Priority: Medium    Cholangitis (H28) 06/07/2023     Priority: Medium    On total parenteral nutrition (TPN) 06/04/2023     Priority: Medium    Fever, unspecified fever cause 06/04/2023     Priority: Medium    Chronic pancreatitis, unspecified pancreatitis type (H) " 06/04/2023     Priority: Medium    History of food intolerance 05/09/2023     Priority: Medium    Malnutrition, unspecified type (H24) 05/09/2023     Priority: Medium    Nausea and vomiting, unspecified vomiting type 05/09/2023     Priority: Medium    Major depression, single episode 03/06/2023     Priority: Medium    Acute postoperative abdominal pain 01/31/2023     Priority: Medium    Feeding intolerance 12/30/2022     Priority: Medium    Myofascial pain 10/14/2022     Priority: Medium     Added automatically from request for surgery [id number]      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  pancreas and spleen removed, islet cells transplanted into liver    3 classes of vaccines needed .    1. Pneumococcal vaccines are as follows:       PCV 13 - one time dose (was given 2017)       PPSV23 - (given 12/1/16); repeat q 5 years      ROLE of PCV 20???    2. Meningococcal vaccines     Menactra - (last given 12/1/16) -  repeat q 5 yrs   NOTE: need to wait 4 wks after PCV 13 has been given.   OR - give Menveo instead as it can be given same time as PCV 13    AND   Bexcero - (got 12/9/16) - does not need to be repeated.     3. The Hib vaccine - (got 12/9/16) - does not need to be repeated.      Poisoning by drug 05/09/2022     Priority: Medium     Formatting of this note might be different from the original.  Per Care Everywhere review:  All oral, IV and injectable steroids are contraindicated (unless in life threatening situations) in [facility]let [employer] transplant recipients. They can cause irreversible loss of islet cell function. Please contact patient's transplant care coordinator ADI Gaffney RN at [phone]/pager [phone] and/or endocrinologist prior to administration.      Type 1 diabetes mellitus without complication (H) 05/09/2022     Priority: Medium     Formatting of this note might be different from the original.  ACTUALLY - DM 3c  - pathogenic diabetes as no pancreas.  (pancreas and spleen removed, islet cells transplanted into liver)    Seeing ENDO at [facility] - Dr Erum Melissa      Intra-abdominal abscess (H) 12/03/2021     Priority: Medium    Postprocedural intraabdominal abscess 12/03/2021     Priority: Medium    Gastrostomy tube obstruction (H) 11/27/2021     Priority: Medium    Abdominal pain, generalized 09/18/2021     Priority: Medium    Adult failure to thrive 08/16/2021     Priority: Medium     Added automatically from request for surgery [id number]      Hypoglycemia 08/05/2021     Priority: Medium    Iron deficiency anemia 03/22/2019     Priority: Medium    Headache, chronic migraine without aura 09/18/2018     Priority: Medium    Chronic pain syndrome 09/18/2018     Priority: Medium    S/P hernia repair 08/23/2018     Priority: Medium    Incisional hernia, without obstruction or gangrene 05/20/2018     Priority: Medium    Adhesive capsulitis of shoulder, unspecified laterality 11/14/2017     Priority: Medium    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  Excess IgG4  [facility]      Gastroparesis      Priority: Medium    ACP (advance care planning) 03/28/2017     Priority: Medium     Advance Care Planning 3/28/2017: Receipt of ACP document:  Received: Health Care Directive which was witnessed or notarized on 12/8/16.  Document previously scanned on 1/12/17.  Validation form completed and scanned.  Code Status reflects choices in most recent ACP document..  Confirmed/documented designated decision maker(s).  Added by May Baires   Advance Care Planning Liaison              Pancreatic insufficiency 01/17/2017     Priority: Medium    Diabetes insipidus (H24) 01/05/2017     Priority: Medium     Formatting of this note might be different from the  original.  Diabetes Insipidus (DI)  Per external records.  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  Depression NOS      Other type of intractable migraine      Priority: Medium     Diagnosis updated by automated process. Provider to review and confirm.      GERD (gastroesophageal reflux disease) 12/01/2010     Priority: Medium    Lumbago 04/18/2005     Priority: Medium     History of L5-S1 degenerative disk disease.        Sensorineural hearing loss 01/10/2005     Priority: Medium     Problem list name updated by automated process. Provider to review      Vertiginous syndrome and labyrinthine disorder 01/10/2005     Priority: Medium     Problem list name updated by automated process. Provider to review  IMO Regulatory Load OCT 2020      Sprain and strain of other specified sites of hip and thigh 12/09/2002     Priority: Medium    Chondromalacia of patella 12/09/2002     Priority: Medium    Need for prophylactic immunotherapy      Priority: Medium     trees, grass, srw, dust mites, cat      Allergic rhinitis due to other allergen 12/21/2001     Priority: Medium    Hypothyroidism      Priority: Medium     Problem list name updated by automated process. Provider to review         PERTINENT MEDICATIONS:  Current Outpatient Medications   Medication Sig Dispense Refill    acetaminophen (TYLENOL) 325 MG/10.15ML solution 20.3 mLs (650 mg) by Per J Tube route every 6 hours as needed for mild pain or fever 473 mL 4    baclofen (LIORESAL) 10 MG tablet 1-2 tablets (10-20 mg) by Per G Tube route 3 times daily 60 tablet 1    buprenorphine  "(SUBUTEX) 2 MG SUBL sublingual tablet Place 2 mg under the tongue 2 times daily      cetirizine (ZYRTEC) 10 MG tablet 1 tablet (10 mg) by Per G Tube route 2 times daily 60 tablet 11    COMPOUNDED NON-CONTROLLED SUBSTANCE (CMPD RX) - PHARMACY TO MIX COMPOUNDED MEDICATION Administer 60 mg amitriptyline 2 mg/ml via G-J tube at bedtime 900 mL 3    COMPOUNDED NON-CONTROLLED SUBSTANCE (CMPD RX) - PHARMACY TO MIX COMPOUNDED MEDICATION Administer 40 mg amitriptyline suspension (2 mg/mL) via G-J tube at bedtime. 600 mL 5    COMPOUNDED NON-CONTROLLED SUBSTANCE (CMPD RX) - PHARMACY TO MIX COMPOUNDED MEDICATION Place 1 enema rectally 2 times daily as needed (for constipation) 60 each 3    Continuous Blood Gluc Sensor (FREESTYLE LISA 3 SENSOR) MISC CHANGE EVERY 14 DAYS 2 each 11    Digestive Enzyme Cartridge (RELIZORB) MARCO 2 Cartridges daily 60 each 6    diphenhydrAMINE (BENADRYL) 12.5 MG/5ML solution Take 25 mg by mouth 4 times daily as needed for other (nausea)      DULoxetine HCl 60 MG CSDR 1 capsule (60 mg) by Per J Tube route daily Sprinkle caps for feeding tube 90 capsule 3    estradiol (VAGIFEM) 10 MCG TABS vaginal tablet INSERT 1 TABLET(10 MCG) VAGINALLY 2 TIMES A WEEK 24 tablet 2    famotidine (PEPCID) 40 MG/5ML suspension 2.5 mLs (20 mg) by Per J Tube route daily 50 mL 4    glucagon 1 MG kit Give 0.1 to 0.15mg( 10-15 units on Insulin sryinge) subcutaneous  every 15 minutes PRN for hypoglycemia. Remix new kit q24hr. Needs up to 3 kit/week. 10 each 3    glucose 40 % GEL gel Take 15-30 g by mouth every 15 minutes as needed for low blood sugar 3 Tube 2    ibuprofen (ADVIL/MOTRIN) 400 MG tablet take 30 mls  by oral route  every 4 - 6 hours as needed      insulin aspart (NOVOLOG FLEXPEN) 100 UNIT/ML pen Take 1 unit if BG is 175-275, and 2 units if >275 up to every 4 hours as needed.  \"Prime\" pen needle with 2 unit airshot before giving, needs supply for 15 unit/day. 15 mL 11    insulin aspart (NOVOLOG PEN) 100 UNIT/ML pen " "Take 1 unit if BG is 175-275, and 2 units if >275 up to every 4 hours as needed.  \"Prime\" pen needle with 2 unit airshot before giving, needs supply for 15 unit/day. 15 mL 11    insulin detemir (LEVEMIR PEN) 100 UNIT/ML pen Take 2 units daily; use 2 unit priming; approximate daily maximum use 6 units (Patient taking differently: Inject 3 Units subcutaneously at bedtime. Takes when starting TPN infusion)      insulin syringe-needle U-100 (30G X 1/2\" 0.3 ML) 30G X 1/2\" 0.3 ML miscellaneous Use 3 syringes daily or as directed. 100 each 11    lactated ringers infusion Inject 1,000 mLs into the vein daily as needed      levothyroxine (SYNTHROID) 25 mcg/mL SUSP Place 6 mLs (150 mcg) into J tube daily. 540 mL 3    lidocaine (LIDODERM) 5 % patch Place onto the skin every 24 hours To prevent lidocaine toxicity, patient should be patch free for 12 hrs daily.      lipase-protease-amylase (CREON) 95340-27357-670645 units CPEP per EC capsule Take 3-4 capsules by mouth 3 times daily (with meals) With oral meals 150 capsule 11    methocarbamol (ROBAXIN) 750 MG tablet Take 1 tablet (750 mg) by mouth 4 times daily as needed for muscle spasms 120 tablet 11    montelukast (SINGULAIR) 10 MG tablet Take by mouth every 24 hours      MOVANTIK 25 MG TABS tablet Take 25 mg by mouth every morning (before breakfast).      Nutritional Supplements (BOOST HIGH PROTEIN) LIQD After above baseline labs are drawn, give: 6 mL/kg to maximum of 360 mL; the beverage is to be consumed within 5 minutes.  0    pantoprazole (PROTONIX) 40 mg IV push injection Inject 40 mg into the vein daily (with breakfast) 30 each 11    parenteral nutrition - PTA/DISCHARGE ORDER The TPN formula will print on the After Visit Summary Report. 1 each 0    polyethylene glycol (MIRALAX) 17 GM/Dose powder Please take 238 grams mixed with 64 oz of Gatorade at 4pm the night before surgery 238 g 0    prochlorperazine (COMPAZINE) 10 MG tablet Take 1 tablet (10 mg) by mouth every 6 " "hours as needed for nausea or vomiting 120 tablet 3    prochlorperazine (COMPRO) 25 MG suppository Place 1 suppository (25 mg) rectally every 12 hours as needed for nausea 10 suppository 2    promethazine 25 mg Inject 25 mg into the vein 3 times daily      promethazine-phenylephrine (PROMETHAZINE VC) 6.25-5 MG/5ML syrup Take 5 mLs by mouth every 6 hours      rimegepant (NURTEC) 75 MG ODT tablet Place 1 tablet (75 mg) under the tongue daily as needed for migraine Maximum of 1 tablet every 24 hours. 8 tablet 11    scopolamine (TRANSDERM) 1 MG/3DAYS 72 hr patch Place 1 patch onto the skin every 72 hours - Transdermal 10 patch 11    sennosides (SENOKOT) 8.8 MG/5ML syrup 5 mLs by Per J Tube route 2 times daily 236 mL 11    Sharps Container (BD SHARPS ) MISC 1 Container as needed 1 each 1    silver nitrate (ARZOL SILVER NIT APPLICATORS) 75-25 % miscellaneous Apply topically as needed for wound care Apply Silver Nitrate Sticks every 2-3 days until granulation tissue resolved.  \"Roll\" tip of Silver Nitrate Q tip directly onto the granulation tissue-bulging tissue area.  Have Agency or Home Care Nurse administer this, if you prefer.  Take care not to touch any healthy tissue or skin.  The tissue will turn white and eventually black & scab off.  May repeat if needed in the future for recurrence. 30 applicator 0    sodium phosphate, mineral oil, magnesium citrate enema Instill 1 pink 187 ml enema twice daily as needed for constipation      STATIN NOT PRESCRIBED (INTENTIONAL) Previous liver issues, risks vs benefits felt to not warrant statin.    Discussed Oct 2022 visit      SYNDROS 5 MG/ML oral soln TAKE 0.5 ML BY MOUTH 2 TIMES DAILY 60 mL 0    zinc oxide - white petrolatum (CRITIC-AID THICK MOIST BARRIER) 20-51% PSTE topical paste Apply 71 g topically every hour as needed for rash (Under J tube bumper when needed for skin protection) 170 g 0    ZOLMitriptan (ZOMIG-ZMT) 5 MG ODT Take 1 tablet (5 mg) by mouth at onset " of headache for migraine Dissolve tablet in the mouth. May repeat in 2 hours. Max 2 tablets/24 hours. 12 tablet 6     Current Facility-Administered Medications   Medication Dose Route Frequency Provider Last Rate Last Admin    Botulinum Toxin Type A (BOTOX) 200 units injection 155 Units  155 Units Intramuscular See Admin Instructions    155 Units at 08/15/24 1731    Botulinum Toxin Type A (BOTOX) 200 units injection 155 Units  155 Units Intramuscular See Admin Instructions Rachelle العلي APRN CNP   155 Units at 05/23/24 1703         PHYSICAL EXAMINATION:  Vitals Wt 63 kg (139 lb)   BMI 21.13 kg/m     Wt   Wt Readings from Last 2 Encounters:   09/25/24 63 kg (139 lb)   09/11/24 63.5 kg (139 lb 15.9 oz)      Gen: Pt sitting up in NAD, interactive and cooperative on exam  Eyes: sclerae anicteric, no injection  ENT:  MMM  Resp: Breathing comfortably on exam, speaking in full sentences  Skin: No jaundice  Neuro: alert, oriented, answers questions appropriately        PERTINENT STUDIES:    Ancillary Procedure on 09/04/2024   Component Date Value Ref Range Status    Creatinine POCT 09/04/2024 0.6  0.5 - 1.0 mg/dL Final    GFR, ESTIMATED POCT 09/04/2024 >60  >60 mL/min/1.73m2 Final

## 2024-09-25 NOTE — PATIENT INSTRUCTIONS
- IBSrela - try a 1 pill (50 mcg)  in the morning and a 1/2 (25 mcg) pill at night, monitor response  - Continue Movantik, Miralax, and pink lady enemas as you are  - ok to stop meclizine if not helping  - continue IV promethazine and compazine suppositories  - continue oral compazine as needed  - ok for liquid benadryl as needed for nausea (12.5 to 25 mg dose)  - can trial Lactaid before oral lactose-containing dairy ingestion  - continue IV pantoprazole  - consider a short break from famotidine and then return to using it as needed  - try Gavison with sodium alginate as needed (Gaviscon Advance or Dual Action)  - best wishes for your upcoming surgery - keep us in the loop about what we can help with  - Ashley will arrange a follow-up GI clinic visit sometime after your colorectal surgery post-op check     If you have any questions, please don't hesitate to contact me through our GI RN Clinic Coordinator, Ashley Mane, at [phone].

## 2024-09-25 NOTE — NURSING NOTE
Current patient location: [address] [place] [id number]    Is the patient currently in the state of [place]? YES    Visit mode:VIDEO    If the visit is dropped, the patient can be reconnected by: VIDEO VISIT: Send to e-mail at: [email or web address]    Will anyone else be joining the visit? NO  (If patient encounters technical issues they should call [phone]    How would you like to obtain your AVS? MyChart    Are changes needed to the allergy or medication list? No    Are refills needed on medications prescribed by this physician? NO    Rooming Documentation:  Questionnaire(s) completed    Reason for visit: EDWARDO RAMIREZF

## 2024-09-27 ENCOUNTER — TEAM CONFERENCE (OUTPATIENT)
Dept: SURGERY | Facility: CLINIC | Age: 59
End: 2024-09-27
Payer: COMMERCIAL

## 2024-09-27 RX ORDER — NEOMYCIN SULFATE 500 MG/1
1000 TABLET ORAL EVERY 6 HOURS
Qty: 6 TABLET | Refills: 0 | Status: SHIPPED | OUTPATIENT
Start: 2024-09-27

## 2024-09-27 NOTE — PROGRESS NOTES
COLON AND RECTAL SURGERY HUDDLE:    Patient was reviewed in preparation for their surgery the following was reviewed and has been completed:    Surgeon: Dr. Kaylene Branch    Surgery & Date: 10/11 LAPAROSCOPIC TOTAL ABDOMINAL COLECTOMY POSSIBLE OPEN WITH ILEORECTAL ANASTAMOSIS, DIVERTING LOOP ILEOSTOMY      Last MD Note: reviewed    Anesthesia Type: General    Other Providers: No    PAC: Yes 9/30    WOC: Yes 9/30    Labs: Yes 9/30    Bowel Prep: Yes MiraLAX / Gatorade , Fleet Enema, Antibiotic, and Magnesium Citrate    Packet: Yes    Imaging: N/A    Post-Op Appointments: Yes    Pre op soap: Yes Questions about shower: no    Is patient on TPN?: Yes   If yes, I contacted the TPN pharmacist by paging the  pharmacy at [phone] or calling [phone]. I also contacted Susana PEREZ with inpatient colon and rectal team.     Pre op call complete: Yes

## 2024-09-30 ENCOUNTER — ANESTHESIA EVENT (OUTPATIENT)
Dept: SURGERY | Facility: CLINIC | Age: 59
End: 2024-09-30
Payer: COMMERCIAL

## 2024-09-30 ENCOUNTER — OFFICE VISIT (OUTPATIENT)
Dept: SURGERY | Facility: CLINIC | Age: 59
End: 2024-09-30
Payer: COMMERCIAL

## 2024-09-30 ENCOUNTER — OFFICE VISIT (OUTPATIENT)
Dept: WOUND CARE | Facility: CLINIC | Age: 59
End: 2024-09-30
Payer: COMMERCIAL

## 2024-09-30 ENCOUNTER — PRE VISIT (OUTPATIENT)
Dept: SURGERY | Facility: CLINIC | Age: 59
End: 2024-09-30

## 2024-09-30 DIAGNOSIS — K59.01 SLOW TRANSIT CONSTIPATION: ICD-10-CM

## 2024-09-30 DIAGNOSIS — Z53.9 NO SHOW: Primary | ICD-10-CM

## 2024-09-30 PROCEDURE — 99211 OFF/OP EST MAY X REQ PHY/QHP: CPT

## 2024-09-30 NOTE — H&P
PLEASE DISREGARD, PATIENT DID NOT SHOW FOR APPOINTMENT.  Magi Valencia PA-C on 9/30/2024 at 11:33 AM        Review of Systems  The complete review of systems is negative other than noted in the HPI or here.   Anesthesia Evaluation   Pt has had prior anesthetic.     History of anesthetic complications  - PONV.      ROS/MED HX  ENT/Pulmonary:     (+)           allergic rhinitis,                             Neurologic:     (+)      migraines,                          Cardiovascular:     (+) Dyslipidemia - -   -  - -                                 Previous cardiac testing   Echo: Date: 11/14/23 Results:  Interpretation Summary  No vegetation on the cardiac valves or PICC.  The PICC is in the right atrium.     Global and regional left ventricular function is normal with an EF of 55-60%.  Global right ventricular function is normal.  No significant valve abnormalities.  There is a left sided septal pouch and a small patent foramen ovale with  intermittent right to left shunt.    Stress Test:  Date: Results:    ECG Reviewed:  Date: 11/9/23 Results:  Sinus rhythm   Normal ECG     Cath:  Date: Results:      METS/Exercise Tolerance:     Hematologic:       Musculoskeletal:   (+)  arthritis,             GI/Hepatic: Comment: Slow transit constipation    Chronic pancreatitis    Gastroparesis    GJ tube    (+) GERD,                   Renal/Genitourinary:       Endo: Comment: Hx of pituitary adenoma    (+) type I DM,         thyroid problem, hypothyroidism,           Psychiatric/Substance Use:     (+) psychiatric history anxiety and depression       Infectious Disease:       Malignancy:       Other:

## 2024-09-30 NOTE — PROGRESS NOTES
[facility] Preoperative Ostomy Consult Via video consult    Referral from Dr. Branch   Consult attended by patient   Diagnosis: Slow transit constipation Date of Surgery: 10/11/2024  LAPAROSCOPIC TOTAL ABDOMINAL COLECTOMY POSSIBLE OPEN WITH ILEORECTAL ANASTAMOSIS, DIVERTING LOOP ILEOSTOMY     Emotional readiness for surgery: no acute distress  Physical Limitations: Without limitations  Abdomen assessed for site by: Patient's ability to visiualize area, avoidance of skin creases and scars, palpating for rectus abdominus muscle, and clothing fit    Teaching: Anatomy/picture of stoma, stoma/bowel function postop, intro to pouches, diet, precautions with dehydration/blockage, and role of WOC/postop followup explained.    Previous consult discussed and reiterated:  She currently has nausea and vomiting possibly related to her constipation and she is wondering if those symptoms will go away.  Remarked that if the nausea and vomiting is related to constipation it would however since she is also status post total pancreatectomy and gastroparesis there might be another causes for the symptoms. Pt had second opinion and the same procure was suggested, She is scheduled for Loop ileosotmy  She is had 2 small bowel obstructions and reminded that she will continue to have the risk of a small bowel obstruction if she has an ileostomy.    She also has a risk of dehydration since we are bypassing the colon.  She remarked that she is getting fluids in addition to her tube feedings.   She remarked that she has a lot of bloating after eating and after tube feedings.  When she eats she typically makes smoothies and milkshakes.  She stared [employer] dairy free tube feeding and bloating problems were reduced significantly  She is worried about tape allergies: she tried different products and the Coloplast products gave her a rash, Would prefer [place]  We discussed the 2 options of an end ileostomy which would be likely permanent and a  loop ileostomy which would be likely temporary if she would be reconnected with an ileorectal anastomosis.All questions were answered.  Discussed high output ileostomy issues and how to deal with those    Stoma site marking:  With marking pen and tegaderm RLCESIA Bowen NP was available for supervision of care if needed or if questions should arise and regarding plan of care.  Aleyda Ceja RN CWON

## 2024-09-30 NOTE — TELEPHONE ENCOUNTER
FUTURE VISIT INFORMATION        SURGERY INFORMATION:  Date: 10/11/24  Location: uu or  Surgeon:  Kaylene Branch MD   Anesthesia Type:  general  Procedure: TOTAL ABDOMINAL COLECTOMY, LAPAROSCOPIC, POSSIBLE OPEN WITH ILEORECTAL ANASTAMOSIS, DIVERTING LOOP ILEOSTOMY      RECORDS REQUESTED FROM:         Primary Care Provider: Juan Jose Gomez MD - [facility]     Most recent EKG+ Tracin23     Most recent ECHO: 23

## 2024-10-01 ENCOUNTER — PATIENT OUTREACH (OUTPATIENT)
Dept: CARE COORDINATION | Facility: CLINIC | Age: 59
End: 2024-10-01
Payer: COMMERCIAL

## 2024-10-01 NOTE — PROGRESS NOTES
Clinic Care Coordination Contact    Situation: Patient chart reviewed by care coordinator.    Background: Patient enrolled in Care Coordination.    Assessment: [employer] message sent to patient for follow up.     Plan/Recommendations: RN will await pt's reply for further outreach.    DARIELA ADAMS RN on 10/1/2024 at 12:01 PM

## 2024-10-02 NOTE — PROGRESS NOTES
Clinic Care Coordination Contact  Follow Up Progress Note      Assessment: RN communicated with patient via [employer]. Please see [employer] exchange for further details regarding patient's upcoming surgery.     Care Gaps:    Health Maintenance Due   Topic Date Due    MIGRAINE ACTION PLAN  Never done    MAMMO SCREENING  06/02/2024    COLORECTAL CANCER SCREENING  06/09/2024    INFLUENZA VACCINE (1) 09/01/2024    COVID-19 Vaccine (7 - 2024-25 season) 09/01/2024       Currently there are no Care Gaps.    Care Plans  Care Plan: Health Maintenance       Problem: Health Maintenance Due or Overdue       Long-Range Goal: Become up-to-date with health maintenance visit(s)       Start Date: 12/5/2023    This Visit's Progress: On track Recent Progress: On track    Priority: High    Note:     Barriers: patient has complex illness and medical care, collaborates with various specialty teams  Strengths: patient has a great relationship with providers and members of care team, keeps healthcare management organized  Patient expressed understanding of goal: yes  Action steps to achieve this goal:  1. I will attend all of my upcoming appointments with my specialist and primary care provider.   2. I will communicate with my RN CC if there are further needs I have for assistance and support with primary care.   3. I will work with my RN CC on identifying all of the goals my providers have for helping me reach my health care needs.     RN goals:  RN to look at pt's care plans from previous visit notes with providers. RN to help patient identify the items that have yet to be completed and stay on top of health care goals.   RN to collaborate with [facility] RN CC for transplant and GI.                                   Intervention/Education provided during outreach: Discussed patients plan of care. CC RN asked open ended questions, provided support, resources, and encouragement as needed. Patient will reach out to care team sooner than  planned with new questions or concerns.        Plan:   Care Coordinator will follow up in 1 month.     DARIELA ADAMS RN on 10/2/2024 at 1:31 PM

## 2024-10-07 LAB
ABO/RH(D): NORMAL
ANTIBODY SCREEN: NEGATIVE
SPECIMEN EXPIRATION DATE: NORMAL

## 2024-10-08 ENCOUNTER — OFFICE VISIT (OUTPATIENT)
Dept: SURGERY | Facility: CLINIC | Age: 59
End: 2024-10-08
Payer: COMMERCIAL

## 2024-10-08 ENCOUNTER — PRE VISIT (OUTPATIENT)
Dept: SURGERY | Facility: CLINIC | Age: 59
End: 2024-10-08

## 2024-10-08 ENCOUNTER — LAB (OUTPATIENT)
Dept: LAB | Facility: CLINIC | Age: 59
End: 2024-10-08
Payer: COMMERCIAL

## 2024-10-08 VITALS
SYSTOLIC BLOOD PRESSURE: 112 MMHG | RESPIRATION RATE: 16 BRPM | TEMPERATURE: 98.3 F | DIASTOLIC BLOOD PRESSURE: 77 MMHG | HEART RATE: 88 BPM | WEIGHT: 146.6 LBS | BODY MASS INDEX: 22.22 KG/M2 | HEIGHT: 68 IN | OXYGEN SATURATION: 97 %

## 2024-10-08 DIAGNOSIS — Z01.818 PRE-OP EVALUATION: Primary | ICD-10-CM

## 2024-10-08 DIAGNOSIS — Z01.818 PRE-OP EVALUATION: ICD-10-CM

## 2024-10-08 DIAGNOSIS — K59.01 SLOW TRANSIT CONSTIPATION: ICD-10-CM

## 2024-10-08 PROCEDURE — 36592 COLLECT BLOOD FROM PICC: CPT

## 2024-10-08 PROCEDURE — 86900 BLOOD TYPING SEROLOGIC ABO: CPT

## 2024-10-08 PROCEDURE — 99205 OFFICE O/P NEW HI 60 MIN: CPT | Performed by: PHYSICIAN ASSISTANT

## 2024-10-08 PROCEDURE — 86850 RBC ANTIBODY SCREEN: CPT

## 2024-10-08 RX ORDER — POLYETHYLENE GLYCOL 3350 17 G/17G
1 POWDER, FOR SOLUTION ORAL 2 TIMES DAILY
Status: ON HOLD | COMMUNITY
End: 2024-10-24

## 2024-10-08 RX ORDER — TENAPANOR HYDROCHLORIDE 53.2 MG/1
50 TABLET ORAL 2 TIMES DAILY
Status: ON HOLD | COMMUNITY
Start: 2024-07-30 | End: 2024-10-24

## 2024-10-08 RX ORDER — MECLIZINE HYDROCHLORIDE 25 MG/1
25 TABLET ORAL 3 TIMES DAILY PRN
COMMUNITY
Start: 2024-08-08

## 2024-10-08 ASSESSMENT — ENCOUNTER SYMPTOMS: SEIZURES: 0

## 2024-10-08 ASSESSMENT — COPD QUESTIONNAIRES: COPD: 0

## 2024-10-08 ASSESSMENT — PAIN SCALES - GENERAL: PAINLEVEL: SEVERE PAIN (6)

## 2024-10-08 ASSESSMENT — LIFESTYLE VARIABLES: TOBACCO_USE: 0

## 2024-10-08 NOTE — NURSING NOTE
Chief Complaint   Patient presents with    Labs Only     Labs drawn via CVC by RN in lab       Labs collected from CVC by RN, line flushed with saline.  Pt states she does not use heparin in her line per home care infusion.    Courtney Devries RN

## 2024-10-08 NOTE — PATIENT INSTRUCTIONS
Preparing for Your Surgery      Name:  Dinora Mcghee   MRN:  [id number]   :  1965   Today's Date:  10/8/2024       Arriving for surgery:  Surgery date:  10/11/2024  Arrival time:  12:00 pm    Please come to:         [facility]    [address] [place]  [address]     The [facility] Patient/Visitor Ramp is at [address]. Patients and visitors who self-park will receive the reduced hospital parking rate. If the Patient /Visitor Ramp is full, please follow the signs to the [employer] car park located at the main hospital entrance.       parking is available (24 hours/ 7 days a week)      Discounted parking pass options are available for patients and visitors. They can be purchased at the [employer] desk at the main hospital entrance.     -    Stop at the security desk and they will direct surgery patients to the Surgery Check in and Family Lounge. [phone]        - If you need directions, a wheelchair or an escort please stop at the Information/security desk in the lobby.     What can I eat or drink?  -  Follow diet restrictions as directed per surgeon bowel prep   -  You may have clear liquids until 2 hours prior to arrival time. (Until 10 am)    Examples of clear liquids:  Water  Clear broth  Juices (apple, white grape, white cranberry  and cider) without pulp  Noncarbonated, powder based beverages  (lemonade and Larry-Aid)  Sodas (Sprite, 7-Up, ginger ale and seltzer)  Coffee or tea (without milk or cream)  Gatorade    -  No Alcohol or cannabis products for at least 24 hours before surgery.     Which medicines can I take?    Hold Aspirin for 7 days before surgery.   Hold Multivitamins for 7 days before surgery.  Hold Supplements for 7 days before surgery.  Hold Ibuprofen (Advil, Motrin) for 1 day(s) before surgery--unless otherwise directed by surgeon.  Hold Naproxen  (Aleve) for 4 days before surgery.      Hold Long acting insulin the evening before surgery     -  DO NOT take these medications the day of surgery:  Cetirizine  Benadryl  Novolog (short acting) insulin  Creon  Miralax  Promethazine   Senokot  Zolmitriptan         -  PLEASE TAKE these medications the day of surgery:  Tylenol if needed  Baclofen   Buprenorphine  Isbrela  Levothyroxine  Meclizine if needed  Methocarbamol if needed  Movantik  Pantoprazole  Prochlorperazine if needed  Scopolamine patch per your routine   Syndros         How do I prepare myself?  - Please take 2 showers (one the night prior to surgery and one the morning of surgery) using Scrubcare or Hibiclens soap.    Use this soap only from the neck to your toes.     Leave the soap on your skin for one minute--then rinse thoroughly.      You may use your own shampoo and conditioner. No other hair products.   - Please remove all jewelry and body piercings.  - No lotions, deodorants or fragrance.  - No makeup or fingernail polish.   - Bring your ID and insurance card.    -If you use a CPAP machine, please bring the CPAP machine, tubing, and mask to hospital.    -If you have a Deep Brain Stimulator, Spinal Cord Stimulator, or any Neuro Stimulator device---you must bring the remote control to the hospital.      ALL PATIENTS GOING HOME THE SAME DAY OF SURGERY ARE REQUIRED TO HAVE A RESPONSIBLE ADULT TO DRIVE AND BE IN ATTENDANCE WITH THEM FOR 24 HOURS FOLLOWING SURGERY.    Covid testing policy as of 12/06/2022  Your surgeon will notify and schedule you for a COVID test if one is needed before surgery--please direct any questions or COVID symptoms to your surgeon      Questions or Concerns:    - For any questions regarding the day of surgery or your hospital stay, please contact the Pre Admission Nursing Office at [phone].       - If you have health changes between today and your surgery, please call your surgeon.     For any questions or concerns,  please contact our care coordinators - Kenya RN: [phone], Vicenta RN: [phone], or Megan RN: [phone]       - For questions after surgery, please call your surgeons office.           Current Visitor Guidelines    You may have 2 visitors in the pre op area.    Visiting hours: 8 a.m. to 8:30 p.m.    Patients confirmed or suspected to have symptoms of COVID 19 or flu:     No visitors allowed for adult patients.   Children (under age 18) can have 1 named visitor.     People who are sick or showing symptoms of COVID 19 or flu:    Are not allowed to visit patients--we can only make exceptions in special situations.       Please follow these guidelines for your visit:          Please maintain social distance          Masking is optional--however at times you may be asked to wear a mask for the safety of yourself and others     Clean your hands with alcohol hand . Do this when you arrive at and leave the building and patient room,    And again after you touch your mask or anything in the room.     Go directly to and from the room you are visiting.     Stay in the patient s room during your visit. Limit going to other places in the hospital as much as possible     Leave bags and jackets at home or in the car.     For everyone s health, please don t come and go during your visit. That includes for smoking   during your visit.

## 2024-10-08 NOTE — H&P
Pre-Operative H & P     CC:  Preoperative exam to assess for increased cardiopulmonary risk while undergoing surgery and anesthesia.    Date of Encounter: 10/8/2024  Primary Care Physician:  Juan Jose Gomez     Reason for visit:   Encounter Diagnoses   Name Primary?    Pre-op evaluation Yes    Slow transit constipation        HPI  Dinora Mcghee is a 59 year old female who presents for pre-operative H & P in preparation for  Procedure Information       Case: [id number] Date/Time: 10/11/24 1400    Procedure: LAPAROSCOPIC TOTAL ABDOMINAL COLECTOMY POSSIBLE OPEN WITH ILEORECTAL ANASTAMOSIS, DIVERTING LOOP ILEOSTOMY (Abdomen)    Anesthesia type: General with Block    Diagnosis: Slow transit constipation [K59.01]    Pre-op diagnosis: Slow transit constipation [K59.01]    Location:  OR  /  OR    Providers: Kaylene Branch MD            Patient is being evaluated for comorbid conditions of type 1 diabetes, allergic rhinitis, arthritis, constipation, chronic pancreatitis, GERD, gastroparesis, hx pituitary adenoma, hypothyroidism, HLD, GJ tube, PONV, anxiety, depression, and migraines.    She has a history of medically refractive constipation secondary to colonic dysmotility and has been working with Dr. Branch for management of this.  He has tried several nonoperative interventions without significant improvement in her symptoms.  During her last follow-up visit with colorectal surgery on 8/14/2024 surgical interventions were discussed and a plan was made to proceed with surgery as above.    History is obtained from the patient and chart review.  She is accompanied by her  today    Hx of abnormal bleeding or anti-platelet use: Denies    Menstrual history: No LMP recorded. Patient has had a hysterectomy.     Past Medical History  Past Medical History:   Diagnosis Date    Allergic rhinitis, cause unspecified     Allergy to other foods     strawberries, apples, celeries, alice, watermelon     Arthritis     left knee    Choledocholithiasis     long after cholecystectomy    Chronic abdominal pain     Chronic constipation     Chronic nausea     Chronic pancreatitis (H)     Degeneration of lumbar or lumbosacral intervertebral disc     Esophageal reflux     w/ hiatal hernia    Gastroparesis     Hiatal hernia     History of pituitary adenoma     s/p resection    Hypothyroidism     Migraines     Mild hyperlipidemia     On tube feeding diet     presence of GJ tube    Pancreatic disease     PD stricture, 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 endometriosis growth    APPENDECTOMY       SECTION  1993    COLONOSCOPY  2014    COLONOSCOPY N/A 2023    Procedure: COLONOSCOPY, WITH POLYPECTOMY AND BIOPSY;  Surgeon: Percy Chamorro MD;  [facility]    ENDOSCOPIC INSERTION TUBE GASTROSTOMY N/A 2021    Procedure: Gastrojejonostomy placement with jejunopexy, PEG tube exchange;  Surgeon: Zackery Montoya MD;  [facility]    ENDOSCOPIC RETROGRADE CHOLANGIOPANCREATOGRAM N/A 2015    Procedure: ENDOSCOPIC RETROGRADE CHOLANGIOPANCREATOGRAM;  Surgeon: Mandeep Park MD;  [facility]    ENDOSCOPIC RETROGRADE CHOLANGIOPANCREATOGRAM N/A 2016    Procedure: COMBINED ENDOSCOPIC RETROGRADE CHOLANGIOPANCREATOGRAPHY, PLACE TUBE/STENT;  Surgeon: Mandeep Park MD;  [facility]    ENDOSCOPIC RETROGRADE CHOLANGIOPANCREATOGRAM N/A 2016    Procedure: COMBINED ENDOSCOPIC RETROGRADE CHOLANGIOPANCREATOGRAPHY, REMOVE FOREIGN BODY OR STENT/TUBE;  Surgeon: Mandeep Park MD;  [facility]    ENDOSCOPIC RETROGRADE  CHOLANGIOPANCREATOGRAM N/A 08/02/2016    Procedure: COMBINED ENDOSCOPIC RETROGRADE CHOLANGIOPANCREATOGRAPHY, PLACE TUBE/STENT;  Surgeon: Mandeep Park MD;  [facility]    ENDOSCOPIC RETROGRADE CHOLANGIOPANCREATOGRAM N/A 08/26/2016    Procedure: COMBINED ENDOSCOPIC RETROGRADE CHOLANGIOPANCREATOGRAPHY, REMOVE FOREIGN BODY OR STENT/TUBE;  Surgeon: Mandeep Park MD;  [facility]    ENDOSCOPIC ULTRASOUND UPPER GASTROINTESTINAL TRACT (GI) N/A 10/03/2016    Procedure: ENDOSCOPIC ULTRASOUND, ESOPHAGOSCOPY / UPPER GASTROINTESTINAL TRACT (GI);  Surgeon: Guru Jose Rodas MD;  [facility]    ENT SURGERY  1989    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    ENTEROSCOPY SMALL BOWEL N/A 02/03/2022    Procedure: ENTEROSCOPY with possible fistula closure;  Surgeon: Francisco Dodson MD;  Location:  GI    ENTEROSCOPY SMALL BOWEL N/A 9/11/2024    Procedure: Upper endoscopy, gastrostomy tube placement and jejunostomy tube exchange;  Surgeon: Zackery Montoya MD;  [facility]    ESOPHAGOSCOPY, GASTROSCOPY, DUODENOSCOPY (EGD), COMBINED N/A 06/24/2015    Procedure: COMBINED ESOPHAGOSCOPY, GASTROSCOPY, DUODENOSCOPY (EGD), REMOVE FOREIGN BODY;  Surgeon: Mandeep Park MD;  [facility]    ESOPHAGOSCOPY, GASTROSCOPY, DUODENOSCOPY (EGD), COMBINED N/A 10/25/2015    Procedure: COMBINED ESOPHAGOSCOPY, GASTROSCOPY, DUODENOSCOPY (EGD);  Surgeon: Sammy Amaro MD;  [facility]    ESOPHAGOSCOPY, GASTROSCOPY, DUODENOSCOPY (EGD), COMBINED N/A 10/25/2015    Procedure: COMBINED ESOPHAGOSCOPY, GASTROSCOPY, DUODENOSCOPY (EGD), BIOPSY SINGLE OR MULTIPLE;  Surgeon: Sammy Amaro MD;  [facility]    ESOPHAGOSCOPY, GASTROSCOPY, DUODENOSCOPY (EGD), COMBINED N/A 01/31/2023    Procedure: ESOPHAGOGASTRODUODENOSCOPY (EGD) with peg replacement ;  Surgeon: Mandeep Park MD;  [facility]    ESOPHAGOSCOPY, GASTROSCOPY, DUODENOSCOPY (EGD), COMBINED N/A 7/24/2024     Procedure: Esophagoscopy, gastroscopy, duodenoscopy (EGD), combined;  Surgeon: Zackery Montoya MD;  [facility]    ESOPHAGOSCOPY, GASTROSCOPY, DUODENOSCOPY (EGD), DILATATION, COMBINED      EXCISE LESION TRUNK N/A 04/17/2017    Procedure: EXCISE LESION TRUNK;  Removal of Abdominal Foreign Body;  Surgeon: Nestor Phoenix MD;  [facility]    HC ESOPH/GAS REFLUX TEST W NASAL IMPED >1 HR N/A 11/19/2015    Procedure: ESOPHAGEAL IMPEDENCE FUNCTION TEST WITH 24 HOUR PH GREATER THAN 1 HOUR;  Surgeon: Thiago Apple MD;  [facility]    HC UGI ENDOSCOPY DIAG W BIOPSY  09/17/2008    HC UGI ENDOSCOPY DIAG W BIOPSY  09/27/2012    HC UGI ENDOSCOPY W ESOPHAGEAL DILATION BALLOON <30MM  09/17/2008    HC UGI ENDOSCOPY W EUS N/A 05/05/2015    Procedure: COMBINED ENDOSCOPIC ULTRASOUND, ESOPHAGOSCOPY, GASTROSCOPY, DUODENOSCOPY (EGD);  Surgeon: Wm Dueñas MD;  [facility]    HC WRIST ARTHROSCOP,RELEASE XVERS LIG Bilateral 12/17/2008    INJECT TRANSVERSUS ABDOMINIS PLANE (TAP) BLOCK BILATERAL Left 09/22/2016    Procedure: INJECT TRANSVERSUS ABDOMINIS PLANE (TAP) BLOCK BILATERAL;  Surgeon: Dickson Corrigan MD;  [facility]    INJECT TRIGGER POINT Bilateral 09/08/2022    Procedure: Abdominal trigger point injection with ultrasound;  Surgeon: Monika Mahajan MD;  [facility]    INJECT TRIGGER POINT SINGLE / MULTIPLE 3 OR MORE MUSCLES Right 11/15/2022    Procedure: Trigger point injections right abdomen with ultrasound guidance;  Surgeon: Monika Mahajan MD;  [facility]: [facility]    IR CHEST PORT PLACEMENT < 5 YRS OF AGE  06/10/2022    IR CVC TUNNEL PLACEMENT > 5 YRS OF AGE  4/15/2024    IR PORT REMOVAL RIGHT  11/09/2023    laparoscopic pineda  01/01/1995    LAPAROSCOPIC HERNIORRHAPHY INCISIONAL N/A 08/23/2018    Procedure: LAPAROSCOPIC HERNIORRHAPHY INCISIONAL;  Laparoscopic Incisional Hernia Repair with Symbotex Mesh Implant;  Surgeon: Nestor Phoenix MD;  [facility]    LAPAROSCOPIC  PANCREATECTOMY, TRANSPLANT AUTO ISLET CELL N/A 12/28/2016    Procedure: LAPAROSCOPIC PANCREATECTOMY, TRANSPLANT AUTO ISLET CELL;  Surgeon: Nestor Phoenix MD;  [facility]    LAPAROTOMY EXPLORATORY N/A 01/31/2023    Procedure: Exploratory Laparotomy, lysis of adhesions, Perforated J-Junostomy Resection, Replace J-Junostomy site;  Surgeon: Elver Bauer MD;  [facility]    LAPAROTOMY, LYSIS ADHESIONS, COMBINED N/A 01/31/2023    Procedure: Dilatation of jejunostomy feeding tube, track with placement of jejunostomy tube with fluoroscopy;  Surgeon: Elver Bauer MD;  [facility]    PICC DOUBLE LUMEN PLACEMENT Left 11/13/2023    Basilic Vein 5F DL 43 cm    REMOVE AND REPLACE BREAST IMPLANT PROSTHESIS N/A 12/30/2022    Procedure: Exploratory Laparotomy, lysis of adhesions, small bowel resection. Placement of gastric jejunostomy for enteral feeding.;  Surgeon: Elver Bauer MD;  [facility]    REMOVE GASTROSTOMY TUBE ADULT N/A 01/28/2022    Procedure: REMOVAL, GASTROSTOMY TUBE, ADULT;  Surgeon: Mandeep Park MD;  [facility]    REPLACE GASTROJEJUNOSTOMY TUBE, PERCUTANEOUS N/A 09/07/2021    Procedure: GASTROJEJUNOSTOMY TUBE PLACEMENT, PERCUTANEOUS, WITH GASTROPEXY;  Surgeon: Mandeep Park MD;  [facility]    REPLACE GASTROJEJUNOSTOMY TUBE, PERCUTANEOUS N/A 09/22/2021    Procedure: REPLACEMENT, GASTROJEJUNOSTOMY TUBE, PERCUTANEOUS;  Surgeon: Zackery Montoya MD;  [facility]    REPLACE GASTROJEJUNOSTOMY TUBE, PERCUTANEOUS N/A 11/22/2022    Procedure: REPLACEMENT, GASTROJEJUNOSTOMY TUBE, PERCUTANEOUS;  Surgeon: Mandeep Park MD;  [facility]    REPLACE GASTROJEJUNOSTOMY TUBE, PERCUTANEOUS N/A 12/09/2022    Procedure: REPLACEMENT, GASTROJEJUNOSTOMY TUBE, PERCUTANEOUS with ENDOSCOPIC SUTURING.;  Surgeon: Mandeep Park MD;  [facility]    REPLACE GASTROJEJUNOSTOMY TUBE, PERCUTANEOUS N/A 08/01/2023    Procedure: Replace  Gastrojejunostomy Tube, Percutaneous;  Surgeon: Mandeep Park MD;  [facility]    REPLACE GASTROJEJUNOSTOMY TUBE, PERCUTANEOUS N/A 2023    Procedure: Replace Gastrojejunostomy Tube, Percutaneous;  Surgeon: Francisco Dodson MD;  [facility]    REPLACE GASTROJEJUNOSTOMY TUBE, PERCUTANEOUS N/A 2023    Procedure: Replace Gastrojejunostomy Tube, Percutaneous;  Surgeon: Mandeep Park MD;  [facility]    REPLACE JEJUNOSTOMY TUBE, PERCUTANEOUS N/A 09/10/2021    Procedure: UPPER ENDOSCOPY, REPLACEMENT OF PERCUTANEOUS GASTROJEJUNOSTOMY TUBE, T-TAG GASTROPEXY;  Surgeon: Zackery Montoya MD;  [facility]    REPLACE JEJUNOSTOMY TUBE, PERCUTANEOUS N/A 2021    Procedure: REPLACEMENT, JEJUNOSTOMY TUBE, PERCUTANEOUS;  Surgeon: Mandeep Park MD;  [facility]    REPLACE JEJUNOSTOMY TUBE, PERCUTANEOUS N/A 2023    Procedure: REPLACEMENT, JEJUNOSTOMY TUBE, PERCUTANEOUS;  Surgeon: Mandeep Park MD;  [facility]    REPLACE JEJUNOSTOMY TUBE, PERCUTANEOUS  2023    Procedure: Replace Jejunostomy Tube, Percutaneous;  Surgeon: Mandeep Park MD;  [facility]    REPLACE JEJUNOSTOMY TUBE, PERCUTANEOUS N/A 2024    Procedure: REPLACEMENT, JEJUNOSTOMY TUBE, PERCUTANEOUS;  Surgeon: Francisco Dodson MD;  [facility]    transphenoidal pituitary resection  1990    [facility]  DELIVERY ONLY  1996    [facility]  DELIVERY ONLY  1998    repeat c section with incidental cystotomy with repair    [facility] EXCIS PITUITARY,TRANSNASAL/SEPTAL  1980    pituitary tumor removed for diabetes insipidus    [facility] TOTAL ABDOM HYSTERECTOMY  1999    w/ bilateral salpingoophorectomy        Prior to Admission Medications  Current Outpatient Medications   Medication Sig Dispense Refill    acetaminophen (TYLENOL) 325 MG/10.15ML solution 20.3 mLs (650 mg) by Per J Tube route every 6 hours as needed for mild pain or fever 473 mL 4    baclofen  (LIORESAL) 10 MG tablet 1-2 tablets (10-20 mg) by Per G Tube route 3 times daily 60 tablet 1    buprenorphine (SUBUTEX) 2 MG SUBL sublingual tablet Place 2 mg under the tongue 2 times daily      cetirizine (ZYRTEC) 10 MG tablet 1 tablet (10 mg) by Per G Tube route 2 times daily (Patient taking differently: Place 10 mg into G tube every morning.) 60 tablet 11    COMPOUNDED NON-CONTROLLED SUBSTANCE (CMPD RX) - PHARMACY TO MIX COMPOUNDED MEDICATION Administer 60 mg amitriptyline 2 mg/ml via G-J tube at bedtime (Patient taking differently: Administer 40 mg amitriptyline 2 mg/ml via G-J tube at bedtime) 900 mL 3    COMPOUNDED NON-CONTROLLED SUBSTANCE (CMPD RX) - PHARMACY TO MIX COMPOUNDED MEDICATION Administer 40 mg amitriptyline suspension (2 mg/mL) via G-J tube at bedtime. 600 mL 5    COMPOUNDED NON-CONTROLLED SUBSTANCE (CMPD RX) - PHARMACY TO MIX COMPOUNDED MEDICATION Place 1 enema rectally 2 times daily as needed (for constipation) 60 each 3    Digestive Enzyme Cartridge (RELIZORB) MARCO 2 Cartridges daily (Patient taking differently: 3 Cartridges daily.) 60 each 6    diphenhydrAMINE (BENADRYL) 12.5 MG/5ML solution Take 25 mg by mouth 4 times daily as needed for other (nausea)      DULoxetine HCl 60 MG CSDR 1 capsule (60 mg) by Per J Tube route daily Sprinkle caps for feeding tube (Patient taking differently: Place 60 mg into J tube every evening. Sprinkle caps for feeding tube) 90 capsule 3    estradiol (VAGIFEM) 10 MCG TABS vaginal tablet INSERT 1 TABLET(10 MCG) VAGINALLY 2 TIMES A WEEK 24 tablet 2    famotidine (PEPCID) 40 MG/5ML suspension 2.5 mLs (20 mg) by Per J Tube route daily (Patient taking differently: Place 20 mg into J tube every evening.) 50 mL 4    glucagon 1 MG kit Give 0.1 to 0.15mg( 10-15 units on Insulin sryinge) subcutaneous  every 15 minutes PRN for hypoglycemia. Remix new kit q24hr. Needs up to 3 kit/week. 10 each 3    glucose 40 % GEL gel Take 15-30 g by mouth every 15 minutes as needed for low  "blood sugar 3 Tube 2    IBSRELA 50 MG TABS Place 50 mg into G tube 2 times daily.      ibuprofen (ADVIL/MOTRIN) 400 MG tablet take 30 mls  by oral route  every 4 - 6 hours as needed      insulin aspart (NOVOLOG FLEXPEN) 100 UNIT/ML pen Take 1 unit if BG is 175-275, and 2 units if >275 up to every 4 hours as needed.  \"Prime\" pen needle with 2 unit airshot before giving, needs supply for 15 unit/day. 15 mL 11    insulin aspart (NOVOLOG PEN) 100 UNIT/ML pen Take 1 unit if BG is 175-275, and 2 units if >275 up to every 4 hours as needed.  \"Prime\" pen needle with 2 unit airshot before giving, needs supply for 15 unit/day. 15 mL 11    insulin detemir (LEVEMIR PEN) 100 UNIT/ML pen Take 2 units daily; use 2 unit priming; approximate daily maximum use 6 units (Patient taking differently: Inject 3 Units subcutaneously at bedtime. Takes when starting TPN infusion)      lactated ringers infusion Inject 1,000 mLs into the vein daily as needed      levothyroxine (SYNTHROID) 25 mcg/mL SUSP Place 6 mLs (150 mcg) into J tube daily. (Patient taking differently: Place 150 mcg into J tube every morning. Triosint) 540 mL 3    lidocaine (LIDODERM) 5 % patch Place onto the skin as needed. To prevent lidocaine toxicity, patient should be patch free for 12 hrs daily.      lipase-protease-amylase (CREON) 81912-02078-010461 units CPEP per EC capsule Take 3-4 capsules by mouth 3 times daily (with meals) With oral meals 150 capsule 11    meclizine (ANTIVERT) 25 MG tablet Take 25 mg by mouth 3 times daily as needed.      methocarbamol (ROBAXIN) 750 MG tablet Take 1 tablet (750 mg) by mouth 4 times daily as needed for muscle spasms 120 tablet 11    montelukast (SINGULAIR) 10 MG tablet Take 10 mg by mouth at bedtime.      MOVANTIK 25 MG TABS tablet Take 25 mg by mouth every morning (before breakfast).      Nutritional Supplements (BOOST HIGH PROTEIN) LIQD After above baseline labs are drawn, give: 6 mL/kg to maximum of 360 mL; the beverage is to " "be consumed within 5 minutes.  0    pantoprazole (PROTONIX) 40 mg IV push injection Inject 40 mg into the vein daily (with breakfast) (Patient taking differently: Inject 40 mg into the vein every morning.) 30 each 11    polyethylene glycol (MIRALAX) 17 GM/Dose powder Take 1 Capful by mouth 2 times daily.      prochlorperazine (COMPAZINE) 10 MG tablet Take 1 tablet (10 mg) by mouth every 6 hours as needed for nausea or vomiting 120 tablet 3    prochlorperazine (COMPRO) 25 MG suppository Place 1 suppository (25 mg) rectally every 12 hours as needed for nausea 10 suppository 2    promethazine 25 mg Inject 25 mg into the vein 3 times daily      rimegepant (NURTEC) 75 MG ODT tablet Place 1 tablet (75 mg) under the tongue daily as needed for migraine Maximum of 1 tablet every 24 hours. 8 tablet 11    scopolamine (TRANSDERM) 1 MG/3DAYS 72 hr patch Place 1 patch onto the skin every 72 hours - Transdermal 10 patch 11    sennosides (SENOKOT) 8.8 MG/5ML syrup 5 mLs by Per J Tube route 2 times daily 236 mL 11    silver nitrate (ARZOL SILVER NIT APPLICATORS) 75-25 % miscellaneous Apply topically as needed for wound care Apply Silver Nitrate Sticks every 2-3 days until granulation tissue resolved.  \"Roll\" tip of Silver Nitrate Q tip directly onto the granulation tissue-bulging tissue area.  Have Agency or Home Care Nurse administer this, if you prefer.  Take care not to touch any healthy tissue or skin.  The tissue will turn white and eventually black & scab off.  May repeat if needed in the future for recurrence. 30 applicator 0    sodium phosphate, mineral oil, magnesium citrate enema Instill 1 pink 187 ml enema twice daily as needed for constipation      SYNDROS 5 MG/ML oral soln TAKE 0.5 ML BY MOUTH 2 TIMES DAILY 60 mL 0    zinc oxide - white petrolatum (CRITIC-AID THICK MOIST BARRIER) 20-51% PSTE topical paste Apply 71 g topically every hour as needed for rash (Under J tube bumper when needed for skin protection) 170 g 0    " "ZOLMitriptan (ZOMIG-ZMT) 5 MG ODT Take 1 tablet (5 mg) by mouth at onset of headache for migraine Dissolve tablet in the mouth. May repeat in 2 hours. Max 2 tablets/24 hours. 12 tablet 6    Continuous Blood Gluc Sensor (FREESTYLE LISA 3 SENSOR) MISC CHANGE EVERY 14 DAYS 2 each 11    insulin syringe-needle U-100 (30G X 1/2\" 0.3 ML) 30G X 1/2\" 0.3 ML miscellaneous Use 3 syringes daily or as directed. 100 each 11    neomycin (MYCIFRADIN) 500 MG tablet Take 2 tablets (1,000 mg) by mouth every 6 hours. At 8:00 am, 2:00 pm, 8:00 pm the day prior to your surgery with flagyl and zofran. (Patient not taking: Reported on 10/8/2024) 6 tablet 0    parenteral nutrition - PTA/DISCHARGE ORDER The TPN formula will print on the After Visit Summary Report. 1 each 0    polyethylene glycol (MIRALAX) 17 GM/Dose powder Please take 238 grams mixed with 64 oz of Gatorade at 4pm the night before surgery (Patient not taking: Reported on 10/8/2024) 238 g 0    promethazine-phenylephrine (PROMETHAZINE VC) 6.25-5 MG/5ML syrup Take 5 mLs by mouth every 6 hours      Sharps Container (BD SHARPS ) MISC 1 Container as needed 1 each 1    STATIN NOT PRESCRIBED (INTENTIONAL) Previous liver issues, risks vs benefits felt to not warrant statin.    Discussed Oct 2022 visit         Allergies  Allergies   Allergen Reactions    Apple Juice Anaphylaxis    Corticosteroids Other (See Comments)     All oral, IV and injectable steroids are contraindicated (unless in life threatening situations) in [facility] transplant recipients. They can cause irreversible loss of islet cell function. Please contact patient's transplant care coordinator ADI Gaffney RN at [phone]/pager [phone] and/or endocrinologist prior to administration.      Depakote [Divalproex Sodium] Other (See Comments)     Chest pain    Zithromax [Azithromycin Dihydrate] Anaphylaxis     Noted in 4/7/08 ER    Bromfenac      Other reaction(s): Headache    Codeine      Other " reaction(s): Hallucinations    Darvocet [Propoxyphene N-Apap] Itching    Morphine Nausea and Vomiting and Rash    Nalbuphine Hcl Rash     RASH :nubaine    Zosyn [Piperacillin-Tazobactam In Dex] Rash     Possible allergy, did have a diffuse rash that seemed drug related but could have also been related to soap in the hospital.     Bactrim [Sulfamethoxazole W-Trimethoprim] Other (See Comments) and Nausea and Vomiting     Severely low liver function.    Statins Other (See Comments)     Previous liver issues, risks vs benefits felt to not warrant statin.  Discussed Oct 2022 visit    Tape [Adhesive Tape] Blisters    Tramadol     Zofran [Ondansetron] Other (See Comments)     migraine    Flagyl [Metronidazole] Hives and Rash       Social History  Social History     Socioeconomic History    Marital status:      Spouse name: Norris    Number of children: 3    Years of education: 16    Highest education level: Not on file   Occupational History    Occupation: director     Employer: [employer]   Tobacco Use    Smoking status: Former     Current packs/day: 0.00     Average packs/day: 0.5 packs/day for 6.3 years (3.2 ttl pk-yrs)     Types: Cigarettes     Start date: 1985     Quit date: 1992     Years since quittin.7    Smokeless tobacco: Not on file    Tobacco comments:     no 2nd hand   Vaping Use    Vaping status: Some Days    Substances: THC, CBD   Substance and Sexual Activity    Alcohol use: Not Currently     Alcohol/week: 3.0 - 6.0 standard drinks of alcohol     Types: 1 - 2 Glasses of wine, 1 - 2 Cans of beer, 1 - 2 Shots of liquor per week     Comment: none since IGG    Drug use: Yes     Comment: medical canabis tincture and vape    Sexual activity: Yes     Partners: Male     Birth control/protection: Male Surgical, Female Surgical, None     Comment: Hystectomy    Other Topics Concern    Parent/sibling w/ CABG, MI or angioplasty before 65F 55M? No     Service Not Asked    Blood Transfusions No     Caffeine Concern Not Asked    Occupational Exposure Not Asked    Hobby Hazards Not Asked    Sleep Concern Not Asked    Stress Concern Not Asked    Weight Concern Not Asked    Special Diet Not Asked    Back Care Not Asked    Exercise Not Asked    Bike Helmet Not Asked    Seat Belt Yes    Self-Exams Not Asked   Social History Narrative     with 3 children and a dog.  No smoking, etoh or drug use.  Worked as a  for [employer] in [place] in the past.  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     Social Determinants of Health     Financial Resource Strain: Low Risk  (4/26/2024)    Financial Resource Strain     Within the past 12 months, have you or your family members you live with been unable to get utilities (heat, electricity) when it was really needed?: No   Food Insecurity: Low Risk  (4/26/2024)    Food Insecurity     Within the past 12 months, did you worry that your food would run out before you got money to buy more?: No     Within the past 12 months, did the food you bought just not last and you didn t have money to get more?: No   Transportation Needs: Low Risk  (4/26/2024)    Transportation Needs     Within the past 12 months, has lack of transportation kept you from medical appointments, getting your medicines, non-medical meetings or appointments, work, or from getting things that you need?: No   Physical Activity: Sufficiently Active (3/19/2024)    Exercise Vital Sign     Days of Exercise per Week: 4 days     Minutes of Exercise per Session: 40 min   Stress: Stress Concern Present (3/19/2024)    [place] [employer] - Occupational Stress Questionnaire     Feeling of Stress : To some extent   Social Connections: Unknown (3/19/2024)    Social Connection and Isolation Panel [NHANES]     Frequency of Communication with Friends and Family: Not on file     Frequency of Social Gatherings with Friends and Family: Once a week      Attends Oriental orthodox Services: Not on file     Active Member of Clubs or Organizations: Not on file     Attends Club or Organization Meetings: Not on file     Marital Status: Not on file   Interpersonal Safety: Low Risk  (9/11/2024)    Interpersonal Safety     Do you feel physically and emotionally safe where you currently live?: Yes     Within the past 12 months, have you been hit, slapped, kicked or otherwise physically hurt by someone?: No     Within the past 12 months, have you been humiliated or emotionally abused in other ways by your partner or ex-partner?: No   Housing Stability: Low Risk  (4/26/2024)    Housing Stability     Do you have housing? : Yes     Are you worried about losing your housing?: No       Family History  Family History   Problem Relation Age of Onset    Lipids Mother     Hypertension Mother     Thyroid Disease Mother     Depression Mother     Angina Mother     GERD Mother     Skin Cancer Mother     Migraines Mother     Autoimmune Disease Mother     Hyperlipidemia Mother     Mental Illness Mother     Cerebrovascular Disease Mother         11/2023    Other Cancer Mother         skin-basil cell    Anxiety Disorder Mother     Post Operative Nausea and Vomiting Mother     Eye Disorder Father         cataract, detached retina    Myocardial Infarction Father 60    Lipids Father     Cerebrovascular Disease Father     Depression Father     Substance Abuse Father     Anesthesia Reaction Father         stroke right after surgery    Cataracts Father     Osteoarthritis Father     Ulcerative Colitis Father     Autoimmune Disease Father     Heart Disease Father     Hyperlipidemia Father     Mental Illness Father     Other Cancer Father         myloma    Anxiety Disorder Father     Interpersonal Violence Sister     Coronary Artery Disease Sister         angioplasty    GERD Sister     Substance Abuse Sister     Cerebrovascular Disease Sister         2005    Depression Sister     Thyroid Disease Sister      Autoimmune Disease Sister     Depression Sister     Mental Illness Sister     Substance Abuse Sister     Thyroid Disease Sister     Eye Disorder Maternal Grandmother         cataract    Thyroid Disease Maternal Grandmother     Diabetes Maternal Grandfather     Eye Disorder Paternal Grandmother         cataract    Diabetes Paternal Grandmother     Eye Disorder Paternal Grandfather         cataract    Diabetes Paternal Grandfather     Substance Abuse Paternal Grandfather     Eye Disorder Son         ptosis    Depression Son     Anxiety Disorder Son     Depression Son     Mental Illness Son     Anxiety Disorder Son     Heart Disease Paternal Aunt     Diabetes Paternal Aunt     Diabetes Paternal Uncle     Heart Disease Paternal Uncle     Depression Nephew     Anxiety Disorder Nephew     Thyroid Disease Nephew     Thyroid Disease Nephew     Anxiety Disorder Nephew     Diabetes Type 2  Cousin         paternal cousin    Autoimmune Disease Cousin        Review of Systems  The complete review of systems is negative other than noted in the HPI or here.   Anesthesia Evaluation   Pt has had prior anesthetic.     History of anesthetic complications  - PONV.      ROS/MED HX  ENT/Pulmonary:     (+)           allergic rhinitis,                          (-) tobacco use, asthma, COPD, YOLY risk factors and recent URI   Neurologic:     (+)    no peripheral neuropathy  migraines,                       (-) no seizures, no CVA and no TIA   Cardiovascular: Comment: Denies chest pain/pressure, ALBARADO, orthopnea, dizziness, palpitations, edema.     (+) Dyslipidemia - -   -  - -                                 Previous cardiac testing   Echo: Date: 11/14/23 Results:  Interpretation Summary  No vegetation on the cardiac valves or PICC.  The PICC is in the right atrium.     Global and regional left ventricular function is normal with an EF of 55-60%.  Global right ventricular function is normal.  No significant valve abnormalities.  There is a  "left sided septal pouch and a small patent foramen ovale with  intermittent right to left shunt.    Stress Test:  Date: Results:    ECG Reviewed:  Date: 11/9/23 Results:  Sinus rhythm   Normal ECG     Cath:  Date: Results:      METS/Exercise Tolerance: >4 METS Comment: Walking 1.5 miles in 30 minutes on a regular basis, able to ascend multiple flights of stairs.    Hematologic:  - neg hematologic  ROS     Musculoskeletal: Comment: Severe joint pain in left thumb  (+)  arthritis,             GI/Hepatic: Comment: Slow transit constipation    Chronic pancreatitis    Gastroparesis    GJ tube    (+) GERD,                (-) liver disease   Renal/Genitourinary:  - neg Renal ROS     Endo: Comment: Hx of pituitary adenoma s/p resection    Hypoglycemia    (+) type I DM,         thyroid problem, hypothyroidism,        (-) chronic steroid usage and obesity   Psychiatric/Substance Use:     (+) psychiatric history anxiety and depression  H/O chronic opiod use .     Infectious Disease:  - neg infectious disease ROS     Malignancy:  - neg malignancy ROS     Other:      (+)  , H/O Chronic Pain,         /77 (BP Location: Left arm, Patient Position: Sitting, Cuff Size: Adult Regular)   Pulse 88   Temp 98.3  F (36.8  C) (Oral)   Resp 16   Ht 1.727 m (5' 8\")   Wt 66.5 kg (146 lb 9.6 oz)   SpO2 97%   BMI 22.29 kg/m      Physical Exam   Constitutional: Pleasant, well-appearing, no apparent distress, and appears stated age.  Eyes: Pupils equal, round and reactive to light, extra ocular muscles intact, sclera clear, conjunctiva normal.  HENT: Normocephalic and atraumatic, oral pharynx with moist mucus membranes, good dentition. No goiter appreciated.   Respiratory: Clear to auscultation bilaterally, no crackles or wheezing.  Cardiovascular: Regular rate and rhythm, normal S1 and S2, and no murmur noted.  Carotids +2, no bruits. No edema. Palpable pulses to radial  DP and PT arteries.   GI: Nondistended abdomen, GJ tube present " with tube feeds running.  Lymph/Hematologic: No cervical lymphadenopathy and no supraclavicular lymphadenopathy.  Genitourinary:  Deferred  Skin: Warm and dry.  No rashes on exposed skin   Musculoskeletal: Full ROM of neck. There is no redness, warmth, or swelling of the visible joints. Gross motor strength is normal.    Neurologic: Awake, alert, oriented to name, place and time. Cranial nerves II-XII are grossly intact. Gait is normal.   Neuropsychiatric: Calm, cooperative. Normal affect.       Prior Labs/Diagnostic Studies   All labs and imaging personally reviewed     EKG/ stress test - if available please see in ROS above   Echo result w/o MOPS: Interpretation SummarySmall mobile echodensity attached to the left coronary cusp of aortic valveconsistent with vegetation if clinical history is suggestive of endocarditis.Trivial pericardial effusion is present.    The patient's records and results personally reviewed by this provider.     Outside records reviewed from: Care Everywhere    LAB/DIAGNOSTIC STUDIES TODAY:  T&S    Assessment  Dinora Mcghee is a 59 year old female seen as a PAC referral for risk assessment and optimization for anesthesia.    Plan/Recommendations  Pt will be optimized for the proposed procedure.  See below for details on the assessment, risk, and preoperative recommendations    NEUROLOGY  - No history of TIA, CVA or seizure  - Migraine headaches  Managed with Botox, last injection in September.  Hold zolmitriptan 24 hours prior to surgery  - Chronic abdominal pain  On chronic opiates, morphine equivilant = 120 MME/Day   Follows with [facility], last visit 9/24/24. Her provider there recommended that she stay on Subutex leading up to surgery. She is working toward possible SCS placement in the future.   -Post Op delirium risk factors:  High co-morbid index    ENT  - No current airway concerns.  Will need to be reassessed day of surgery.  Mallampati: I  TM: > 3    CARDIAC  - No  "history of CAD, Hypertension, and Afib  - Hyperlipidemia  History of this noted on chart review. Lipid panel completed yesterday WNL     - METS (Metabolic Equivalents)  Patient performs 4 or more METS exercise without symptoms             Total Score: 0      RCRI-Moderate risk: Class 3  6.6% complication rate             Total Score: 2    RCRI: High Risk Surgery    RCRI: Diabetes      Patient is able to walk up to 1.5 minutes continuously and ascend stairs without exertional symptoms.  No additional cardiac testing recommended at this time.    PULMONARY    YOLY Low Risk             Total Score: 1    YOLY: Over 50 ys old      - Denies asthma or inhaler use  - Tobacco History    History   Smoking Status    Former    Types: Cigarettes   Smokeless Tobacco    Not on file       GI  - Gastroparesis s/p GJ tube  Uses J for tube feeds and G for venting. Reports venting about 75% of the time.   She is in TPN on Sunday/Tuesday/Thursday nights.   - Slow transit constipation  See HPI. Above surgery planned for further management.   - Please see GI notes for thorough summary of complex GI history.    PONV High Risk  Total Score: 4           1 AN PONV: Pt is Female    1 AN PONV: Patient is not a current smoker    1 AN PONV: Patient has history of PONV    1 AN PONV: Intended Post Op Opioids    Patient states she will wear a scopolamine patch to surgery.     /RENAL  - Baseline Creatinine  0.61    ENDOCRINE    - BMI: Estimated body mass index is 22.29 kg/m  as calculated from the following:    Height as of this encounter: 1.727 m (5' 8\").    Weight as of this encounter: 66.5 kg (146 lb 9.6 oz).  Healthy Weight (BMI 18.5-24.9)  - Diabetes  Hemoglobin A1C (%)   Date Value   07/23/2024 5.4   08/05/2021 5.4     Insulin dependent. She reports a history of hypoglycemia in the perioperative setting. Patient received instructions from endocrinologist on 9/25/24. Dr. Melissa recommended that she hold her long acting insulin the night before. " Hold morning oral hypoglycemic medications and short acting insulin DOS.   Recommend close monitoring of the patient's blood glucose levels throughout the perioperative period.  - S/p total pancreatectomy with islet auto transplantation  (2016)  - History of pituitary adenoma s/p resection  - Hypothyroidism  Continue thyroid medication    HEME  VTE Low Risk 0.26%             Total Score: 0      - No history of abnormal bleeding or antiplatelet use.  - A type and screen has been ordered for this patient  - CBC 10/7/24    MSK  Patient is NOT Frail             Total Score: 1    Frailty: Weight loss 10 lbs or greater      - Patient reports significant joint pain in left thumb    PSYCH  - Anxiety, Depression  Continue mental health medications without interruption    Different anesthesia methods/types have been discussed with the patient, but they are aware that the final plan will be decided by the assigned anesthesia provider on the date of service.    The patient is optimized for their procedure. AVS with information on surgery time/arrival time, meds and NPO status given by nursing staff. No further diagnostic testing indicated.      On the day of service:     Prep time: 20 minutes  Visit time: 20 minutes  Documentation time: 20 minutes  ------------------------------------------  Total time: 60 minutes      Magi Valencia PA-C  Preoperative Assessment Center  [facility]  Phone: [phone]  Fax: [phone]

## 2024-10-09 NOTE — TELEPHONE ENCOUNTER
Left message for patient regarding rescheduling surgery with Dr. Branch on 10/11 due to IV fluid shortage.      Direct call back number given  Ph: [phone]      Shannon Jennings on 10/9/2024 at 10:51 AM

## 2024-10-09 NOTE — TELEPHONE ENCOUNTER
Patient returned call regarding surgery cancellation on 10/11 with Dr. Branch due to IV fluid shortage.     Advised patient we are awaiting more information as to the shortage and will call to reschedule as soon as we are able.     Post-op appointments cancelled.     Shannon Jennings on 10/9/2024 at 2:07 PM

## 2024-10-09 NOTE — TELEPHONE ENCOUNTER
Left message for patient regarding surgery cancellation with Dr. Branch on 10/11 due to IV fluid shortage.      Direct call back number given  Ph: [phone]

## 2024-10-10 ENCOUNTER — HOME INFUSION (OUTPATIENT)
Dept: HOME HEALTH SERVICES | Facility: HOME HEALTH | Age: 59
End: 2024-10-10
Payer: COMMERCIAL

## 2024-10-10 NOTE — TELEPHONE ENCOUNTER
Spoke to patient to reschedule surgery with Dr. Branch for 10/11.      Patient agreed to reschedule surgery and inquired as to prep.      Message sent to RN to advise patient as to prep instructions under these unique circumstances.      Shannon Jennings on 10/10/2024 at 12:51 PM

## 2024-10-11 ENCOUNTER — ANESTHESIA (OUTPATIENT)
Dept: SURGERY | Facility: CLINIC | Age: 59
End: 2024-10-11
Payer: COMMERCIAL

## 2024-10-11 ENCOUNTER — HOSPITAL ENCOUNTER (INPATIENT)
Facility: CLINIC | Age: 59
LOS: 17 days | Discharge: HOME HEALTH | End: 2024-10-28
Attending: COLON & RECTAL SURGERY | Admitting: COLON & RECTAL SURGERY
Payer: COMMERCIAL

## 2024-10-11 DIAGNOSIS — G89.18 ACUTE POST-OPERATIVE PAIN: ICD-10-CM

## 2024-10-11 DIAGNOSIS — B37.2 CANDIDIASIS OF SKIN: ICD-10-CM

## 2024-10-11 DIAGNOSIS — T81.43XA POSTPROCEDURAL INTRAABDOMINAL ABSCESS (H): ICD-10-CM

## 2024-10-11 DIAGNOSIS — E13.9 POST-PANCREATECTOMY DIABETES (H): ICD-10-CM

## 2024-10-11 DIAGNOSIS — K91.2 HYPOGLYCEMIA AFTER GI (GASTROINTESTINAL) SURGERY: ICD-10-CM

## 2024-10-11 DIAGNOSIS — Z90.410 POST-PANCREATECTOMY DIABETES (H): ICD-10-CM

## 2024-10-11 DIAGNOSIS — E89.1 POST-PANCREATECTOMY DIABETES (H): ICD-10-CM

## 2024-10-11 DIAGNOSIS — K65.1 POSTPROCEDURAL INTRAABDOMINAL ABSCESS (H): ICD-10-CM

## 2024-10-11 DIAGNOSIS — K59.00 CONSTIPATION, UNSPECIFIED CONSTIPATION TYPE: Primary | ICD-10-CM

## 2024-10-11 DIAGNOSIS — R11.2 NAUSEA AND VOMITING, UNSPECIFIED VOMITING TYPE: ICD-10-CM

## 2024-10-11 DIAGNOSIS — E23.2 DIABETES INSIPIDUS (H): ICD-10-CM

## 2024-10-11 DIAGNOSIS — Z78.9 ON TOTAL PARENTERAL NUTRITION (TPN): ICD-10-CM

## 2024-10-11 DIAGNOSIS — E10.9 TYPE 1 DIABETES MELLITUS WITHOUT COMPLICATION (H): ICD-10-CM

## 2024-10-11 DIAGNOSIS — Z93.2 ILEOSTOMY STATUS (H): ICD-10-CM

## 2024-10-11 DIAGNOSIS — Z94.83 S/P PANCREATIC ISLET CELL TRANSPLANTATION (H): ICD-10-CM

## 2024-10-11 LAB
ANION GAP SERPL CALCULATED.3IONS-SCNC: 11 MMOL/L (ref 7–15)
BUN SERPL-MCNC: 13.4 MG/DL (ref 6–20)
CALCIUM SERPL-MCNC: 8.5 MG/DL (ref 8.8–10.4)
CHLORIDE SERPL-SCNC: 100 MMOL/L (ref 98–107)
CREAT SERPL-MCNC: 0.65 MG/DL (ref 0.51–0.95)
EGFRCR SERPLBLD CKD-EPI 2021: >90 ML/MIN/1.73M2
GLUCOSE BLDC GLUCOMTR-MCNC: 109 MG/DL (ref 70–99)
GLUCOSE BLDC GLUCOMTR-MCNC: 124 MG/DL (ref 70–99)
GLUCOSE BLDC GLUCOMTR-MCNC: 141 MG/DL (ref 70–99)
GLUCOSE BLDC GLUCOMTR-MCNC: 152 MG/DL (ref 70–99)
GLUCOSE BLDC GLUCOMTR-MCNC: 184 MG/DL (ref 70–99)
GLUCOSE BLDC GLUCOMTR-MCNC: 209 MG/DL (ref 70–99)
GLUCOSE SERPL-MCNC: 204 MG/DL (ref 70–99)
HCO3 SERPL-SCNC: 26 MMOL/L (ref 22–29)
MAGNESIUM SERPL-MCNC: 1.7 MG/DL (ref 1.7–2.3)
PHOSPHATE SERPL-MCNC: 4.2 MG/DL (ref 2.5–4.5)
POTASSIUM SERPL-SCNC: 3.9 MMOL/L (ref 3.4–5.3)
SODIUM SERPL-SCNC: 137 MMOL/L (ref 135–145)

## 2024-10-11 PROCEDURE — 250N000009 HC RX 250

## 2024-10-11 PROCEDURE — 250N000011 HC RX IP 250 OP 636: Mod: JZ | Performed by: COLON & RECTAL SURGERY

## 2024-10-11 PROCEDURE — 83735 ASSAY OF MAGNESIUM: CPT | Performed by: COLON & RECTAL SURGERY

## 2024-10-11 PROCEDURE — 272N000001 HC OR GENERAL SUPPLY STERILE: Performed by: COLON & RECTAL SURGERY

## 2024-10-11 PROCEDURE — 0D1B0Z4 BYPASS ILEUM TO CUTANEOUS, OPEN APPROACH: ICD-10-PCS | Performed by: COLON & RECTAL SURGERY

## 2024-10-11 PROCEDURE — 88307 TISSUE EXAM BY PATHOLOGIST: CPT | Mod: TC | Performed by: COLON & RECTAL SURGERY

## 2024-10-11 PROCEDURE — 250N000011 HC RX IP 250 OP 636

## 2024-10-11 PROCEDURE — 0DN80ZZ RELEASE SMALL INTESTINE, OPEN APPROACH: ICD-10-PCS | Performed by: COLON & RECTAL SURGERY

## 2024-10-11 PROCEDURE — 64488 TAP BLOCK BI INJECTION: CPT | Mod: 59 | Performed by: ANESTHESIOLOGY

## 2024-10-11 PROCEDURE — 0DJD8ZZ INSPECTION OF LOWER INTESTINAL TRACT, VIA NATURAL OR ARTIFICIAL OPENING ENDOSCOPIC: ICD-10-PCS | Performed by: COLON & RECTAL SURGERY

## 2024-10-11 PROCEDURE — 250N000013 HC RX MED GY IP 250 OP 250 PS 637: Performed by: STUDENT IN AN ORGANIZED HEALTH CARE EDUCATION/TRAINING PROGRAM

## 2024-10-11 PROCEDURE — 44204 LAPARO PARTIAL COLECTOMY: CPT | Performed by: ANESTHESIOLOGY

## 2024-10-11 PROCEDURE — 360N000077 HC SURGERY LEVEL 4, PER MIN: Performed by: COLON & RECTAL SURGERY

## 2024-10-11 PROCEDURE — P9045 ALBUMIN (HUMAN), 5%, 250 ML: HCPCS

## 2024-10-11 PROCEDURE — 0JN80ZZ RELEASE ABDOMEN SUBCUTANEOUS TISSUE AND FASCIA, OPEN APPROACH: ICD-10-PCS | Performed by: COLON & RECTAL SURGERY

## 2024-10-11 PROCEDURE — C9290 INJ, BUPIVACAINE LIPOSOME: HCPCS | Performed by: STUDENT IN AN ORGANIZED HEALTH CARE EDUCATION/TRAINING PROGRAM

## 2024-10-11 PROCEDURE — 258N000003 HC RX IP 258 OP 636: Performed by: COLON & RECTAL SURGERY

## 2024-10-11 PROCEDURE — 80048 BASIC METABOLIC PNL TOTAL CA: CPT | Performed by: COLON & RECTAL SURGERY

## 2024-10-11 PROCEDURE — 258N000003 HC RX IP 258 OP 636

## 2024-10-11 PROCEDURE — 0DTE0ZZ RESECTION OF LARGE INTESTINE, OPEN APPROACH: ICD-10-PCS | Performed by: COLON & RECTAL SURGERY

## 2024-10-11 PROCEDURE — 250N000025 HC SEVOFLURANE, PER MIN: Performed by: COLON & RECTAL SURGERY

## 2024-10-11 PROCEDURE — 120N000002 HC R&B MED SURG/OB UMMC

## 2024-10-11 PROCEDURE — 250N000011 HC RX IP 250 OP 636: Performed by: STUDENT IN AN ORGANIZED HEALTH CARE EDUCATION/TRAINING PROGRAM

## 2024-10-11 PROCEDURE — 999N000141 HC STATISTIC PRE-PROCEDURE NURSING ASSESSMENT: Performed by: COLON & RECTAL SURGERY

## 2024-10-11 PROCEDURE — 88307 TISSUE EXAM BY PATHOLOGIST: CPT | Mod: 26 | Performed by: STUDENT IN AN ORGANIZED HEALTH CARE EDUCATION/TRAINING PROGRAM

## 2024-10-11 PROCEDURE — 44204 LAPARO PARTIAL COLECTOMY: CPT | Performed by: REGISTERED NURSE

## 2024-10-11 PROCEDURE — 272N000002 HC OR SUPPLY OTHER OPNP: Performed by: COLON & RECTAL SURGERY

## 2024-10-11 PROCEDURE — 44150 REMOVAL OF COLON: CPT | Mod: 22 | Performed by: COLON & RECTAL SURGERY

## 2024-10-11 PROCEDURE — 0D2DXUZ CHANGE FEEDING DEVICE IN LOWER INTESTINAL TRACT, EXTERNAL APPROACH: ICD-10-PCS | Performed by: COLON & RECTAL SURGERY

## 2024-10-11 PROCEDURE — 44120 REMOVAL OF SMALL INTESTINE: CPT | Mod: 59 | Performed by: COLON & RECTAL SURGERY

## 2024-10-11 PROCEDURE — 370N000017 HC ANESTHESIA TECHNICAL FEE, PER MIN: Performed by: COLON & RECTAL SURGERY

## 2024-10-11 PROCEDURE — 250N000009 HC RX 250: Performed by: COLON & RECTAL SURGERY

## 2024-10-11 PROCEDURE — 710N000010 HC RECOVERY PHASE 1, LEVEL 2, PER MIN: Performed by: COLON & RECTAL SURGERY

## 2024-10-11 PROCEDURE — 250N000011 HC RX IP 250 OP 636: Performed by: COLON & RECTAL SURGERY

## 2024-10-11 PROCEDURE — 250N000011 HC RX IP 250 OP 636: Performed by: INTERNAL MEDICINE

## 2024-10-11 PROCEDURE — 44300 OPEN BOWEL TO SKIN: CPT | Mod: 59 | Performed by: COLON & RECTAL SURGERY

## 2024-10-11 PROCEDURE — 0DB80ZZ EXCISION OF SMALL INTESTINE, OPEN APPROACH: ICD-10-PCS | Performed by: COLON & RECTAL SURGERY

## 2024-10-11 PROCEDURE — 84100 ASSAY OF PHOSPHORUS: CPT | Performed by: COLON & RECTAL SURGERY

## 2024-10-11 RX ORDER — HYDROMORPHONE HCL IN WATER/PF 6 MG/30 ML
0.2 PATIENT CONTROLLED ANALGESIA SYRINGE INTRAVENOUS
Status: DISCONTINUED | OUTPATIENT
Start: 2024-10-11 | End: 2024-10-12

## 2024-10-11 RX ORDER — CLINDAMYCIN PHOSPHATE 900 MG/50ML
900 INJECTION, SOLUTION INTRAVENOUS
Status: COMPLETED | OUTPATIENT
Start: 2024-10-11 | End: 2024-10-11

## 2024-10-11 RX ORDER — SODIUM CHLORIDE, SODIUM GLUCONATE, SODIUM ACETATE, POTASSIUM CHLORIDE AND MAGNESIUM CHLORIDE 526; 502; 368; 37; 30 MG/100ML; MG/100ML; MG/100ML; MG/100ML; MG/100ML
INJECTION, SOLUTION INTRAVENOUS CONTINUOUS PRN
Status: DISCONTINUED | OUTPATIENT
Start: 2024-10-11 | End: 2024-10-11

## 2024-10-11 RX ORDER — SODIUM CHLORIDE, SODIUM LACTATE, POTASSIUM CHLORIDE, CALCIUM CHLORIDE 600; 310; 30; 20 MG/100ML; MG/100ML; MG/100ML; MG/100ML
INJECTION, SOLUTION INTRAVENOUS CONTINUOUS
Status: DISCONTINUED | OUTPATIENT
Start: 2024-10-11 | End: 2024-10-13

## 2024-10-11 RX ORDER — HYDROMORPHONE HCL IN WATER/PF 6 MG/30 ML
0.4 PATIENT CONTROLLED ANALGESIA SYRINGE INTRAVENOUS EVERY 5 MIN PRN
Status: DISCONTINUED | OUTPATIENT
Start: 2024-10-11 | End: 2024-10-11 | Stop reason: HOSPADM

## 2024-10-11 RX ORDER — FLUMAZENIL 0.1 MG/ML
0.2 INJECTION, SOLUTION INTRAVENOUS
Status: DISCONTINUED | OUTPATIENT
Start: 2024-10-11 | End: 2024-10-11 | Stop reason: HOSPADM

## 2024-10-11 RX ORDER — NALOXONE HYDROCHLORIDE 0.4 MG/ML
0.2 INJECTION, SOLUTION INTRAMUSCULAR; INTRAVENOUS; SUBCUTANEOUS
Status: DISCONTINUED | OUTPATIENT
Start: 2024-10-11 | End: 2024-10-28 | Stop reason: HOSPADM

## 2024-10-11 RX ORDER — GLYCOPYRROLATE 0.2 MG/ML
INJECTION, SOLUTION INTRAMUSCULAR; INTRAVENOUS PRN
Status: DISCONTINUED | OUTPATIENT
Start: 2024-10-11 | End: 2024-10-11

## 2024-10-11 RX ORDER — LIDOCAINE HYDROCHLORIDE 20 MG/ML
INJECTION, SOLUTION INFILTRATION; PERINEURAL PRN
Status: DISCONTINUED | OUTPATIENT
Start: 2024-10-11 | End: 2024-10-11

## 2024-10-11 RX ORDER — FENTANYL CITRATE 50 UG/ML
INJECTION, SOLUTION INTRAMUSCULAR; INTRAVENOUS PRN
Status: DISCONTINUED | OUTPATIENT
Start: 2024-10-11 | End: 2024-10-11

## 2024-10-11 RX ORDER — NALOXONE HYDROCHLORIDE 0.4 MG/ML
0.2 INJECTION, SOLUTION INTRAMUSCULAR; INTRAVENOUS; SUBCUTANEOUS
Status: DISCONTINUED | OUTPATIENT
Start: 2024-10-11 | End: 2024-10-11 | Stop reason: HOSPADM

## 2024-10-11 RX ORDER — CLINDAMYCIN PHOSPHATE 900 MG/50ML
900 INJECTION, SOLUTION INTRAVENOUS SEE ADMIN INSTRUCTIONS
Status: DISCONTINUED | OUTPATIENT
Start: 2024-10-11 | End: 2024-10-11 | Stop reason: HOSPADM

## 2024-10-11 RX ORDER — NALOXONE HYDROCHLORIDE 0.4 MG/ML
0.4 INJECTION, SOLUTION INTRAMUSCULAR; INTRAVENOUS; SUBCUTANEOUS
Status: DISCONTINUED | OUTPATIENT
Start: 2024-10-11 | End: 2024-10-11 | Stop reason: HOSPADM

## 2024-10-11 RX ORDER — HYDROMORPHONE HCL IN WATER/PF 6 MG/30 ML
0.2 PATIENT CONTROLLED ANALGESIA SYRINGE INTRAVENOUS EVERY 5 MIN PRN
Status: DISCONTINUED | OUTPATIENT
Start: 2024-10-11 | End: 2024-10-11 | Stop reason: HOSPADM

## 2024-10-11 RX ORDER — PROPOFOL 10 MG/ML
INJECTION, EMULSION INTRAVENOUS CONTINUOUS PRN
Status: DISCONTINUED | OUTPATIENT
Start: 2024-10-11 | End: 2024-10-11

## 2024-10-11 RX ORDER — OXYCODONE HYDROCHLORIDE 5 MG/1
5 TABLET ORAL EVERY 4 HOURS PRN
Status: DISCONTINUED | OUTPATIENT
Start: 2024-10-11 | End: 2024-10-12

## 2024-10-11 RX ORDER — ACETAMINOPHEN 325 MG/1
975 TABLET ORAL EVERY 8 HOURS
Status: DISCONTINUED | OUTPATIENT
Start: 2024-10-11 | End: 2024-10-12

## 2024-10-11 RX ORDER — HEPARIN SODIUM 5000 [USP'U]/.5ML
5000 INJECTION, SOLUTION INTRAVENOUS; SUBCUTANEOUS EVERY 8 HOURS
Status: DISCONTINUED | OUTPATIENT
Start: 2024-10-12 | End: 2024-10-16

## 2024-10-11 RX ORDER — SODIUM CHLORIDE, SODIUM LACTATE, POTASSIUM CHLORIDE, CALCIUM CHLORIDE 600; 310; 30; 20 MG/100ML; MG/100ML; MG/100ML; MG/100ML
INJECTION, SOLUTION INTRAVENOUS CONTINUOUS PRN
Status: DISCONTINUED | OUTPATIENT
Start: 2024-10-11 | End: 2024-10-11

## 2024-10-11 RX ORDER — FENTANYL CITRATE 50 UG/ML
25 INJECTION, SOLUTION INTRAMUSCULAR; INTRAVENOUS EVERY 5 MIN PRN
Status: DISCONTINUED | OUTPATIENT
Start: 2024-10-11 | End: 2024-10-11 | Stop reason: HOSPADM

## 2024-10-11 RX ORDER — ONDANSETRON 2 MG/ML
4 INJECTION INTRAMUSCULAR; INTRAVENOUS EVERY 6 HOURS PRN
Status: DISCONTINUED | OUTPATIENT
Start: 2024-10-11 | End: 2024-10-14

## 2024-10-11 RX ORDER — SCOLOPAMINE TRANSDERMAL SYSTEM 1 MG/1
1 PATCH, EXTENDED RELEASE TRANSDERMAL ONCE
Status: COMPLETED | OUTPATIENT
Start: 2024-10-11 | End: 2024-10-12

## 2024-10-11 RX ORDER — FENTANYL CITRATE 50 UG/ML
50 INJECTION, SOLUTION INTRAMUSCULAR; INTRAVENOUS EVERY 5 MIN PRN
Status: DISCONTINUED | OUTPATIENT
Start: 2024-10-11 | End: 2024-10-11 | Stop reason: HOSPADM

## 2024-10-11 RX ORDER — EPHEDRINE SULFATE 50 MG/ML
INJECTION, SOLUTION INTRAMUSCULAR; INTRAVENOUS; SUBCUTANEOUS PRN
Status: DISCONTINUED | OUTPATIENT
Start: 2024-10-11 | End: 2024-10-11

## 2024-10-11 RX ORDER — APREPITANT 40 MG/1
40 CAPSULE ORAL ONCE
Status: DISCONTINUED | OUTPATIENT
Start: 2024-10-11 | End: 2024-10-11

## 2024-10-11 RX ORDER — DEXTROSE MONOHYDRATE 100 MG/ML
INJECTION, SOLUTION INTRAVENOUS CONTINUOUS PRN
Status: DISCONTINUED | OUTPATIENT
Start: 2024-10-11 | End: 2024-10-28 | Stop reason: HOSPADM

## 2024-10-11 RX ORDER — LIDOCAINE 40 MG/G
CREAM TOPICAL
Status: DISCONTINUED | OUTPATIENT
Start: 2024-10-11 | End: 2024-10-28 | Stop reason: HOSPADM

## 2024-10-11 RX ORDER — HEPARIN SODIUM 5000 [USP'U]/.5ML
5000 INJECTION, SOLUTION INTRAVENOUS; SUBCUTANEOUS
Status: COMPLETED | OUTPATIENT
Start: 2024-10-11 | End: 2024-10-11

## 2024-10-11 RX ORDER — PROPOFOL 10 MG/ML
INJECTION, EMULSION INTRAVENOUS PRN
Status: DISCONTINUED | OUTPATIENT
Start: 2024-10-11 | End: 2024-10-11

## 2024-10-11 RX ORDER — NALOXONE HYDROCHLORIDE 0.4 MG/ML
0.1 INJECTION, SOLUTION INTRAMUSCULAR; INTRAVENOUS; SUBCUTANEOUS
Status: DISCONTINUED | OUTPATIENT
Start: 2024-10-11 | End: 2024-10-11 | Stop reason: HOSPADM

## 2024-10-11 RX ORDER — BUPIVACAINE HYDROCHLORIDE 2.5 MG/ML
INJECTION, SOLUTION EPIDURAL; INFILTRATION; INTRACAUDAL
Status: COMPLETED | OUTPATIENT
Start: 2024-10-11 | End: 2024-10-11

## 2024-10-11 RX ORDER — LIDOCAINE 40 MG/G
CREAM TOPICAL
Status: DISCONTINUED | OUTPATIENT
Start: 2024-10-11 | End: 2024-10-11 | Stop reason: HOSPADM

## 2024-10-11 RX ORDER — GABAPENTIN 100 MG/1
100 CAPSULE ORAL 3 TIMES DAILY
Status: DISCONTINUED | OUTPATIENT
Start: 2024-10-11 | End: 2024-10-12

## 2024-10-11 RX ORDER — ACETAMINOPHEN 325 MG/1
650 TABLET ORAL EVERY 4 HOURS PRN
Status: DISCONTINUED | OUTPATIENT
Start: 2024-10-14 | End: 2024-10-12

## 2024-10-11 RX ORDER — FENTANYL CITRATE 50 UG/ML
25-50 INJECTION, SOLUTION INTRAMUSCULAR; INTRAVENOUS
Status: DISCONTINUED | OUTPATIENT
Start: 2024-10-11 | End: 2024-10-11 | Stop reason: HOSPADM

## 2024-10-11 RX ORDER — ONDANSETRON 4 MG/1
4 TABLET, ORALLY DISINTEGRATING ORAL EVERY 6 HOURS PRN
Status: DISCONTINUED | OUTPATIENT
Start: 2024-10-11 | End: 2024-10-14

## 2024-10-11 RX ORDER — ONDANSETRON 2 MG/ML
4 INJECTION INTRAMUSCULAR; INTRAVENOUS ONCE
Status: DISCONTINUED | OUTPATIENT
Start: 2024-10-11 | End: 2024-10-11

## 2024-10-11 RX ORDER — NALOXONE HYDROCHLORIDE 0.4 MG/ML
0.4 INJECTION, SOLUTION INTRAMUSCULAR; INTRAVENOUS; SUBCUTANEOUS
Status: DISCONTINUED | OUTPATIENT
Start: 2024-10-11 | End: 2024-10-28 | Stop reason: HOSPADM

## 2024-10-11 RX ORDER — DEXTROSE MONOHYDRATE 25 G/50ML
25-50 INJECTION, SOLUTION INTRAVENOUS
Status: DISCONTINUED | OUTPATIENT
Start: 2024-10-11 | End: 2024-10-28 | Stop reason: HOSPADM

## 2024-10-11 RX ORDER — ALBUMIN HUMAN 5 %
INTRAVENOUS SOLUTION INTRAVENOUS PRN
Status: DISCONTINUED | OUTPATIENT
Start: 2024-10-11 | End: 2024-10-11

## 2024-10-11 RX ORDER — DIMENHYDRINATE 50 MG/ML
25 INJECTION, SOLUTION INTRAMUSCULAR; INTRAVENOUS
Status: COMPLETED | OUTPATIENT
Start: 2024-10-11 | End: 2024-10-11

## 2024-10-11 RX ORDER — HYDROMORPHONE HCL IN WATER/PF 6 MG/30 ML
0.4 PATIENT CONTROLLED ANALGESIA SYRINGE INTRAVENOUS
Status: DISCONTINUED | OUTPATIENT
Start: 2024-10-11 | End: 2024-10-12

## 2024-10-11 RX ORDER — NICOTINE POLACRILEX 4 MG
15-30 LOZENGE BUCCAL
Status: DISCONTINUED | OUTPATIENT
Start: 2024-10-11 | End: 2024-10-28 | Stop reason: HOSPADM

## 2024-10-11 RX ORDER — CALCIUM CHLORIDE 100 MG/ML
INJECTION INTRAVENOUS; INTRAVENTRICULAR PRN
Status: DISCONTINUED | OUTPATIENT
Start: 2024-10-11 | End: 2024-10-11

## 2024-10-11 RX ORDER — HALOPERIDOL 5 MG/ML
1 INJECTION INTRAMUSCULAR ONCE
Status: COMPLETED | OUTPATIENT
Start: 2024-10-11 | End: 2024-10-11

## 2024-10-11 RX ORDER — ACETAMINOPHEN 10 MG/ML
1000 INJECTION, SOLUTION INTRAVENOUS ONCE
Status: COMPLETED | OUTPATIENT
Start: 2024-10-11 | End: 2024-10-11

## 2024-10-11 RX ORDER — VASOPRESSIN IN 0.9 % NACL 2 UNIT/2ML
SYRINGE (ML) INTRAVENOUS PRN
Status: DISCONTINUED | OUTPATIENT
Start: 2024-10-11 | End: 2024-10-11

## 2024-10-11 RX ORDER — OXYCODONE HYDROCHLORIDE 10 MG/1
10 TABLET ORAL EVERY 4 HOURS PRN
Status: DISCONTINUED | OUTPATIENT
Start: 2024-10-11 | End: 2024-10-12

## 2024-10-11 RX ADMIN — FENTANYL CITRATE 50 MCG: 50 INJECTION, SOLUTION INTRAMUSCULAR; INTRAVENOUS at 19:13

## 2024-10-11 RX ADMIN — HYDROMORPHONE HYDROCHLORIDE 0.2 MG: 0.2 INJECTION, SOLUTION INTRAMUSCULAR; INTRAVENOUS; SUBCUTANEOUS at 20:47

## 2024-10-11 RX ADMIN — PHENYLEPHRINE HYDROCHLORIDE 0.3 MCG/KG/MIN: 10 INJECTION INTRAVENOUS at 13:20

## 2024-10-11 RX ADMIN — Medication 20 MG: at 17:09

## 2024-10-11 RX ADMIN — EPHEDRINE SULFATE 5 MG: 5 INJECTION INTRAVENOUS at 13:46

## 2024-10-11 RX ADMIN — PROMETHAZINE HYDROCHLORIDE 12.5 MG: 25 INJECTION INTRAMUSCULAR; INTRAVENOUS at 14:25

## 2024-10-11 RX ADMIN — EPHEDRINE SULFATE 10 MG: 5 INJECTION INTRAVENOUS at 13:25

## 2024-10-11 RX ADMIN — PHENYLEPHRINE HYDROCHLORIDE 150 MCG: 10 INJECTION INTRAVENOUS at 13:22

## 2024-10-11 RX ADMIN — ACETAMINOPHEN 975 MG: 325 TABLET, FILM COATED ORAL at 22:24

## 2024-10-11 RX ADMIN — Medication 20 MG: at 14:42

## 2024-10-11 RX ADMIN — HYDROMORPHONE HYDROCHLORIDE 0.2 MG: 0.2 INJECTION, SOLUTION INTRAMUSCULAR; INTRAVENOUS; SUBCUTANEOUS at 20:37

## 2024-10-11 RX ADMIN — PHENYLEPHRINE HYDROCHLORIDE 100 MCG: 10 INJECTION INTRAVENOUS at 16:46

## 2024-10-11 RX ADMIN — DIMENHYDRINATE 25 MG: 50 INJECTION, SOLUTION INTRAMUSCULAR; INTRAVENOUS at 20:41

## 2024-10-11 RX ADMIN — EPHEDRINE SULFATE 10 MG: 5 INJECTION INTRAVENOUS at 13:30

## 2024-10-11 RX ADMIN — PROCHLORPERAZINE EDISYLATE 5 MG: 5 INJECTION INTRAMUSCULAR; INTRAVENOUS at 19:17

## 2024-10-11 RX ADMIN — ASCORBIC ACID, VITAMIN A PALMITATE, CHOLECALCIFEROL, THIAMINE HYDROCHLORIDE, RIBOFLAVIN-5 PHOSPHATE SODIUM, PYRIDOXINE HYDROCHLORIDE, NIACINAMIDE, DEXPANTHENOL, ALPHA-TOCOPHEROL ACETATE, VITAMIN K1, FOLIC ACID, BIOTIN, CYANOCOBALAMIN: 200; 3300; 200; 6; 3.6; 6; 40; 15; 10; 150; 600; 60; 5 INJECTION, SOLUTION INTRAVENOUS at 22:25

## 2024-10-11 RX ADMIN — SODIUM CHLORIDE, SODIUM GLUCONATE, SODIUM ACETATE, POTASSIUM CHLORIDE AND MAGNESIUM CHLORIDE: 526; 502; 368; 37; 30 INJECTION, SOLUTION INTRAVENOUS at 12:58

## 2024-10-11 RX ADMIN — CLINDAMYCIN IN 5 PERCENT DEXTROSE 900 MG: 18 INJECTION, SOLUTION INTRAVENOUS at 12:29

## 2024-10-11 RX ADMIN — Medication 50 MG: at 12:58

## 2024-10-11 RX ADMIN — OXYCODONE HYDROCHLORIDE 10 MG: 10 TABLET ORAL at 22:25

## 2024-10-11 RX ADMIN — BUPIVACAINE 20 ML: 13.3 INJECTION, SUSPENSION, LIPOSOMAL INFILTRATION at 12:10

## 2024-10-11 RX ADMIN — PHENYLEPHRINE HYDROCHLORIDE 100 MCG: 10 INJECTION INTRAVENOUS at 17:03

## 2024-10-11 RX ADMIN — ALBUMIN (HUMAN) 250 ML: 12.5 SOLUTION INTRAVENOUS at 14:05

## 2024-10-11 RX ADMIN — CLINDAMYCIN PHOSPHATE 900 MG: 900 INJECTION, SOLUTION INTRAVENOUS at 18:25

## 2024-10-11 RX ADMIN — Medication 10 MG: at 16:23

## 2024-10-11 RX ADMIN — ACETAMINOPHEN 1000 MG: 10 INJECTION, SOLUTION INTRAVENOUS at 20:18

## 2024-10-11 RX ADMIN — PROPOFOL 150 MG: 10 INJECTION, EMULSION INTRAVENOUS at 12:56

## 2024-10-11 RX ADMIN — HALOPERIDOL LACTATE 1 MG: 5 INJECTION, SOLUTION INTRAMUSCULAR at 19:39

## 2024-10-11 RX ADMIN — GLYCOPYRROLATE 0.2 MG: 0.2 INJECTION, SOLUTION INTRAMUSCULAR; INTRAVENOUS at 13:58

## 2024-10-11 RX ADMIN — GENTAMICIN SULFATE 340 MG: 40 INJECTION, SOLUTION INTRAMUSCULAR; INTRAVENOUS at 12:29

## 2024-10-11 RX ADMIN — FENTANYL CITRATE 50 MCG: 50 INJECTION, SOLUTION INTRAMUSCULAR; INTRAVENOUS at 19:37

## 2024-10-11 RX ADMIN — PHENYLEPHRINE HYDROCHLORIDE 200 MCG: 10 INJECTION INTRAVENOUS at 13:10

## 2024-10-11 RX ADMIN — PHENYLEPHRINE HYDROCHLORIDE 100 MCG: 10 INJECTION INTRAVENOUS at 13:04

## 2024-10-11 RX ADMIN — FENTANYL CITRATE 100 MCG: 50 INJECTION INTRAMUSCULAR; INTRAVENOUS at 12:55

## 2024-10-11 RX ADMIN — PROPOFOL 10 MCG/KG/MIN: 10 INJECTION, EMULSION INTRAVENOUS at 13:19

## 2024-10-11 RX ADMIN — SODIUM CHLORIDE, POTASSIUM CHLORIDE, SODIUM LACTATE AND CALCIUM CHLORIDE: 600; 310; 30; 20 INJECTION, SOLUTION INTRAVENOUS at 12:43

## 2024-10-11 RX ADMIN — Medication 10 MG: at 18:03

## 2024-10-11 RX ADMIN — HEPARIN SODIUM 5000 UNITS: 5000 INJECTION, SOLUTION INTRAVENOUS; SUBCUTANEOUS at 12:38

## 2024-10-11 RX ADMIN — HYDROMORPHONE HYDROCHLORIDE 0.2 MG: 0.2 INJECTION, SOLUTION INTRAMUSCULAR; INTRAVENOUS; SUBCUTANEOUS at 20:26

## 2024-10-11 RX ADMIN — Medication 1 UNITS: at 13:55

## 2024-10-11 RX ADMIN — FENTANYL CITRATE 50 MCG: 50 INJECTION, SOLUTION INTRAMUSCULAR; INTRAVENOUS at 12:15

## 2024-10-11 RX ADMIN — HYDROMORPHONE HYDROCHLORIDE 0.5 MG: 1 INJECTION, SOLUTION INTRAMUSCULAR; INTRAVENOUS; SUBCUTANEOUS at 14:55

## 2024-10-11 RX ADMIN — ALBUMIN (HUMAN) 250 ML: 12.5 SOLUTION INTRAVENOUS at 14:00

## 2024-10-11 RX ADMIN — LIDOCAINE HYDROCHLORIDE 60 MG: 20 INJECTION, SOLUTION INFILTRATION; PERINEURAL at 12:55

## 2024-10-11 RX ADMIN — Medication 100 MG: at 18:26

## 2024-10-11 RX ADMIN — CALCIUM CHLORIDE INJECTION 500 MG: 100 INJECTION, SOLUTION INTRAVENOUS at 13:57

## 2024-10-11 RX ADMIN — Medication 20 MG: at 15:35

## 2024-10-11 RX ADMIN — ALBUMIN (HUMAN) 250 ML: 12.5 SOLUTION INTRAVENOUS at 16:40

## 2024-10-11 RX ADMIN — Medication 20 MG: at 14:00

## 2024-10-11 RX ADMIN — Medication 10 MG: at 16:44

## 2024-10-11 RX ADMIN — PHENYLEPHRINE HYDROCHLORIDE 150 MCG: 10 INJECTION INTRAVENOUS at 13:30

## 2024-10-11 RX ADMIN — BUPIVACAINE HYDROCHLORIDE 20 ML: 2.5 INJECTION, SOLUTION EPIDURAL; INFILTRATION; INTRACAUDAL; PERINEURAL at 12:10

## 2024-10-11 RX ADMIN — MIDAZOLAM 0.5 MG: 1 INJECTION INTRAMUSCULAR; INTRAVENOUS at 12:15

## 2024-10-11 RX ADMIN — HYDROMORPHONE HYDROCHLORIDE 0.4 MG: 0.2 INJECTION, SOLUTION INTRAMUSCULAR; INTRAVENOUS; SUBCUTANEOUS at 20:55

## 2024-10-11 ASSESSMENT — ACTIVITIES OF DAILY LIVING (ADL)
ADLS_ACUITY_SCORE: 27
ADLS_ACUITY_SCORE: 25
ADLS_ACUITY_SCORE: 27
ADLS_ACUITY_SCORE: 25
ADLS_ACUITY_SCORE: 27

## 2024-10-11 ASSESSMENT — ENCOUNTER SYMPTOMS: DYSRHYTHMIAS: 0

## 2024-10-11 ASSESSMENT — COPD QUESTIONNAIRES: COPD: 0

## 2024-10-11 ASSESSMENT — LIFESTYLE VARIABLES: TOBACCO_USE: 0

## 2024-10-11 NOTE — ANESTHESIA PROCEDURE NOTES
Airway       Patient location during procedure: OR       Procedure Start/Stop Times: 10/11/2024 12:59 PM  Staff -        CRNA: Arsen Fitch APRN CRNA       Performed By: CRNA  Consent for Airway        Urgency: elective  Indications and Patient Condition       Indications for airway management: andrez-procedural       Induction type:intravenous       Mask difficulty assessment: 1 - vent by mask    Final Airway Details       Final airway type: endotracheal airway       Successful airway: ETT - single  Endotracheal Airway Details        ETT size (mm): 7.0       Successful intubation technique: direct laryngoscopy       DL Blade Type: MAC 3       Grade View of Cords: 1       Measured from: lips       Secured at (cm): 22    Post intubation assessment        Placement verified by: capnometry, equal breath sounds and chest rise        Number of attempts at approach: 1       Number of other approaches attempted: 0       Secured with: tape       Ease of procedure: easy       Dentition: Intact and Unchanged    Medication(s) Administered   Medication Administration Time: 10/11/2024 12:59 PM    Additional Comments       Atraumatic intubation. +ETCO2 and bilateral breath sounds.

## 2024-10-11 NOTE — ANESTHESIA PROCEDURE NOTES
TAP Procedure Note    Pre-Procedure   Staff -        Anesthesiologist:  Gina Severino DO       Resident/Fellow: Antoinette Porras MD       Other Anesthesia Staff: Martinez Arteaga MD       Performed By: resident       Location: pre-op       Procedure Start/Stop Times: 10/11/2024 12:10 PM       Pre-Anesthestic Checklist: patient identified, IV checked, site marked, risks and benefits discussed, informed consent, monitors and equipment checked, pre-op evaluation, at physician/surgeon's request and post-op pain management  Timeout:       Correct Patient: Yes        Correct Procedure: Yes        Correct Site: Yes        Correct Position: Yes        Correct Laterality: N/A   Procedure Documentation  Procedure: TAP       Diagnosis: POST OPERATIVE PAIN       Laterality: bilateral       Patient Position: supine       Patient Prep/Sterile Barriers: sterile gloves, mask       Skin prep: Chloraprep       Needle Type: short bevel       Needle Gauge: 21.        Needle Length (millimeters): 110        Ultrasound guided       1. Ultrasound was used to identify targeted nerve, plexus, vascular marker, or fascial plane and place a needle adjacent to it in real-time.       2. Ultrasound was used to visualize the spread of anesthetic in close proximity to the above referenced structure.       3. A permanent image is entered into the patient's record.       4. The visualized anatomic structures appeared normal.    Assessment/Narrative         The placement was negative for: blood aspirated, painful injection and site bleeding       Paresthesias: No.       Bolus given via needle..        Secured via.        Insertion/Infusion Method: Single Shot       Complications: none       Injection made incrementally with aspirations every 5 mL.    Medication(s) Administered   Bupivacaine 0.25% PF (Infiltration) - Infiltration   20 mL - 10/11/2024 12:10:00 PM  Bupivacaine liposome (Exparel) 1.3% LA inj susp (Infiltration) - Infiltration   20 mL -  "10/11/2024 12:10:00 PM  Medication Administration Time: 10/11/2024 12:10 PM      FOR [facility] ([facility]) ONLY:   Pain Team Contact information: please page the Pain Team Via [employer]. Search \"Pain\". During daytime hours, please page the attending first. At night please page the resident first.      "

## 2024-10-11 NOTE — OR NURSING
Temp:  [98  F (36.7  C)] 98  F (36.7  C)  Pulse:  [83-87] 83  Resp:  [12-19] 12  BP: ()/(63-78) 103/66  SpO2:  [98 %-100 %] 100 %    Bilateral Abdominal block performed without complications, 0.5mg versed, 50mcg fentanyl given.  NSR, VSS, 2L O2 via NC.  Pt tolerated well.  Will continue to monitor.

## 2024-10-11 NOTE — ANESTHESIA PREPROCEDURE EVALUATION
Anesthesia Pre-Procedure Evaluation    Patient: Dinora Mcghee   MRN: [id number] : 1965        Procedure : Procedure(s):  LAPAROSCOPIC TOTAL ABDOMINAL COLECTOMY POSSIBLE OPEN WITH ILEORECTAL ANASTAMOSIS, DIVERTING LOOP ILEOSTOMY          Past Medical History:   Diagnosis Date    Allergic rhinitis, cause unspecified     Allergy to other foods     strawberries, apples, celeries, alice, watermelon    Arthritis     left knee    Choledocholithiasis     long after cholecystectomy    Chronic abdominal pain     Chronic constipation     Chronic nausea     Chronic pancreatitis (H)     Degeneration of lumbar or lumbosacral intervertebral disc     Esophageal reflux     w/ hiatal hernia    Gastroparesis     Hiatal hernia     History of pituitary adenoma     s/p resection    Hypothyroidism     Migraines     Mild hyperlipidemia     On tube feeding diet     presence of GJ tube    Pancreatic disease     PD stricture, 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 endometriosis growth    APPENDECTOMY       SECTION  1993    COLONOSCOPY  2014    COLONOSCOPY N/A 2023    Procedure: COLONOSCOPY, WITH POLYPECTOMY AND BIOPSY;  Surgeon: Percy Chamorro MD;  Location:  GI    ENDOSCOPIC INSERTION TUBE GASTROSTOMY N/A 2021    Procedure: Gastrojejonostomy placement with jejunopexy, PEG tube exchange;  Surgeon: Zackery Montoya MD;  Location:  OR    ENDOSCOPIC RETROGRADE CHOLANGIOPANCREATOGRAM N/A 2015    Procedure: ENDOSCOPIC RETROGRADE CHOLANGIOPANCREATOGRAM;  Surgeon: Mandeep Park MD;  [facility]    ENDOSCOPIC RETROGRADE CHOLANGIOPANCREATOGRAM N/A 2016    Procedure:  COMBINED ENDOSCOPIC RETROGRADE CHOLANGIOPANCREATOGRAPHY, PLACE TUBE/STENT;  Surgeon: Mandeep Park MD;  [facility]    ENDOSCOPIC RETROGRADE CHOLANGIOPANCREATOGRAM N/A 03/17/2016    Procedure: COMBINED ENDOSCOPIC RETROGRADE CHOLANGIOPANCREATOGRAPHY, REMOVE FOREIGN BODY OR STENT/TUBE;  Surgeon: Mandeep Park MD;  [facility]    ENDOSCOPIC RETROGRADE CHOLANGIOPANCREATOGRAM N/A 08/02/2016    Procedure: COMBINED ENDOSCOPIC RETROGRADE CHOLANGIOPANCREATOGRAPHY, PLACE TUBE/STENT;  Surgeon: Mandeep Park MD;  [facility]    ENDOSCOPIC RETROGRADE CHOLANGIOPANCREATOGRAM N/A 08/26/2016    Procedure: COMBINED ENDOSCOPIC RETROGRADE CHOLANGIOPANCREATOGRAPHY, REMOVE FOREIGN BODY OR STENT/TUBE;  Surgeon: Mandeep Park MD;  [facility]    ENDOSCOPIC ULTRASOUND UPPER GASTROINTESTINAL TRACT (GI) N/A 10/03/2016    Procedure: ENDOSCOPIC ULTRASOUND, ESOPHAGOSCOPY / UPPER GASTROINTESTINAL TRACT (GI);  Surgeon: Guru Jose Rodas MD;  Location:  OR    ENT SURGERY  1989    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    ENTEROSCOPY SMALL BOWEL N/A 02/03/2022    Procedure: ENTEROSCOPY with possible fistula closure;  Surgeon: Francisco Dodson MD;  Location: [facility]    ENTEROSCOPY SMALL BOWEL N/A 9/11/2024    Procedure: Upper endoscopy, gastrostomy tube placement and jejunostomy tube exchange;  Surgeon: Zackery Montoya MD;  Location:  OR    ESOPHAGOSCOPY, GASTROSCOPY, DUODENOSCOPY (EGD), COMBINED N/A 06/24/2015    Procedure: COMBINED ESOPHAGOSCOPY, GASTROSCOPY, DUODENOSCOPY (EGD), REMOVE FOREIGN BODY;  Surgeon: Mandeep Park MD;  Location:  GI    ESOPHAGOSCOPY, GASTROSCOPY, DUODENOSCOPY (EGD), COMBINED N/A 10/25/2015    Procedure: COMBINED ESOPHAGOSCOPY, GASTROSCOPY, DUODENOSCOPY (EGD);  Surgeon: Sammy Amaro MD;  Location:  GI    ESOPHAGOSCOPY, GASTROSCOPY, DUODENOSCOPY (EGD), COMBINED N/A 10/25/2015    Procedure: COMBINED ESOPHAGOSCOPY, GASTROSCOPY,  DUODENOSCOPY (EGD), BIOPSY SINGLE OR MULTIPLE;  Surgeon: Sammy Amaro MD;  [facility]    ESOPHAGOSCOPY, GASTROSCOPY, DUODENOSCOPY (EGD), COMBINED N/A 01/31/2023    Procedure: ESOPHAGOGASTRODUODENOSCOPY (EGD) with peg replacement ;  Surgeon: Mandeep Park MD;  [facility]    ESOPHAGOSCOPY, GASTROSCOPY, DUODENOSCOPY (EGD), COMBINED N/A 7/24/2024    Procedure: Esophagoscopy, gastroscopy, duodenoscopy (EGD), combined;  Surgeon: Zackery Montoya MD;  [facility]    ESOPHAGOSCOPY, GASTROSCOPY, DUODENOSCOPY (EGD), DILATATION, COMBINED      EXCISE LESION TRUNK N/A 04/17/2017    Procedure: EXCISE LESION TRUNK;  Removal of Abdominal Foreign Body;  Surgeon: Nestor Phoenix MD;  [facility]    HC ESOPH/GAS REFLUX TEST W NASAL IMPED >1 HR N/A 11/19/2015    Procedure: ESOPHAGEAL IMPEDENCE FUNCTION TEST WITH 24 HOUR PH GREATER THAN 1 HOUR;  Surgeon: Thiago Apple MD;  [facility]    HC UGI ENDOSCOPY DIAG W BIOPSY  09/17/2008    HC UGI ENDOSCOPY DIAG W BIOPSY  09/27/2012    HC UGI ENDOSCOPY W ESOPHAGEAL DILATION BALLOON <30MM  09/17/2008    HC UGI ENDOSCOPY W EUS N/A 05/05/2015    Procedure: COMBINED ENDOSCOPIC ULTRASOUND, ESOPHAGOSCOPY, GASTROSCOPY, DUODENOSCOPY (EGD);  Surgeon: Wm Dueñas MD;  [facility]    HC WRIST ARTHROSCOP,RELEASE XVERS LIG Bilateral 12/17/2008    INJECT TRANSVERSUS ABDOMINIS PLANE (TAP) BLOCK BILATERAL Left 09/22/2016    Procedure: INJECT TRANSVERSUS ABDOMINIS PLANE (TAP) BLOCK BILATERAL;  Surgeon: Dickson Corrigan MD;  [facility]    INJECT TRIGGER POINT Bilateral 09/08/2022    Procedure: Abdominal trigger point injection with ultrasound;  Surgeon: Monika Mahajan MD;  Location: [facility]    INJECT TRIGGER POINT SINGLE / MULTIPLE 3 OR MORE MUSCLES Right 11/15/2022    Procedure: Trigger point injections right abdomen with ultrasound guidance;  Surgeon: Monika Mahajan MD;  Location: [facility] OR    IR CHEST PORT PLACEMENT < 5 YRS OF AGE   06/10/2022    IR CVC TUNNEL PLACEMENT > 5 YRS OF AGE  4/15/2024    IR PORT REMOVAL RIGHT  11/09/2023    laparoscopic pineda  01/01/1995    LAPAROSCOPIC HERNIORRHAPHY INCISIONAL N/A 08/23/2018    Procedure: LAPAROSCOPIC HERNIORRHAPHY INCISIONAL;  Laparoscopic Incisional Hernia Repair with Symbotex Mesh Implant;  Surgeon: Nestor Phoenix MD;  [facility]    LAPAROSCOPIC PANCREATECTOMY, TRANSPLANT AUTO ISLET CELL N/A 12/28/2016    Procedure: LAPAROSCOPIC PANCREATECTOMY, TRANSPLANT AUTO ISLET CELL;  Surgeon: Nestor Phoenix MD;  [facility]    LAPAROTOMY EXPLORATORY N/A 01/31/2023    Procedure: Exploratory Laparotomy, lysis of adhesions, Perforated J-Junostomy Resection, Replace J-Junostomy site;  Surgeon: Elver Bauer MD;  [facility]    LAPAROTOMY, LYSIS ADHESIONS, COMBINED N/A 01/31/2023    Procedure: Dilatation of jejunostomy feeding tube, track with placement of jejunostomy tube with fluoroscopy;  Surgeon: Elver Bauer MD;  [facility]    PICC DOUBLE LUMEN PLACEMENT Left 11/13/2023    Basilic Vein 5F DL 43 cm    REMOVE AND REPLACE BREAST IMPLANT PROSTHESIS N/A 12/30/2022    Procedure: Exploratory Laparotomy, lysis of adhesions, small bowel resection. Placement of gastric jejunostomy for enteral feeding.;  Surgeon: Elver Bauer MD;  [facility]    REMOVE GASTROSTOMY TUBE ADULT N/A 01/28/2022    Procedure: REMOVAL, GASTROSTOMY TUBE, ADULT;  Surgeon: Mandeep Park MD;  [facility]    REPLACE GASTROJEJUNOSTOMY TUBE, PERCUTANEOUS N/A 09/07/2021    Procedure: GASTROJEJUNOSTOMY TUBE PLACEMENT, PERCUTANEOUS, WITH GASTROPEXY;  Surgeon: Mandeep Park MD;  [facility]    REPLACE GASTROJEJUNOSTOMY TUBE, PERCUTANEOUS N/A 09/22/2021    Procedure: REPLACEMENT, GASTROJEJUNOSTOMY TUBE, PERCUTANEOUS;  Surgeon: Zackery Montoya MD;  [facility]    REPLACE GASTROJEJUNOSTOMY TUBE, PERCUTANEOUS N/A 11/22/2022    Procedure: REPLACEMENT,  GASTROJEJUNOSTOMY TUBE, PERCUTANEOUS;  Surgeon: Mandeep Park MD;  [facility]    REPLACE GASTROJEJUNOSTOMY TUBE, PERCUTANEOUS N/A 2022    Procedure: REPLACEMENT, GASTROJEJUNOSTOMY TUBE, PERCUTANEOUS with ENDOSCOPIC SUTURING.;  Surgeon: Mandeep Park MD;  [facility]    REPLACE GASTROJEJUNOSTOMY TUBE, PERCUTANEOUS N/A 2023    Procedure: Replace Gastrojejunostomy Tube, Percutaneous;  Surgeon: Mandeep Park MD;  [facility]    REPLACE GASTROJEJUNOSTOMY TUBE, PERCUTANEOUS N/A 2023    Procedure: Replace Gastrojejunostomy Tube, Percutaneous;  Surgeon: Francisco Dodson MD;  [facility]    REPLACE GASTROJEJUNOSTOMY TUBE, PERCUTANEOUS N/A 2023    Procedure: Replace Gastrojejunostomy Tube, Percutaneous;  Surgeon: Mandeep Park MD;  [facility]    REPLACE JEJUNOSTOMY TUBE, PERCUTANEOUS N/A 09/10/2021    Procedure: UPPER ENDOSCOPY, REPLACEMENT OF PERCUTANEOUS GASTROJEJUNOSTOMY TUBE, T-TAG GASTROPEXY;  Surgeon: Zackery Montoya MD;  [facility]    REPLACE JEJUNOSTOMY TUBE, PERCUTANEOUS N/A 2021    Procedure: REPLACEMENT, JEJUNOSTOMY TUBE, PERCUTANEOUS;  Surgeon: Mandeep Park MD;  [facility]    REPLACE JEJUNOSTOMY TUBE, PERCUTANEOUS N/A 2023    Procedure: REPLACEMENT, JEJUNOSTOMY TUBE, PERCUTANEOUS;  Surgeon: Mandeep Park MD;  [facility]    REPLACE JEJUNOSTOMY TUBE, PERCUTANEOUS  2023    Procedure: Replace Jejunostomy Tube, Percutaneous;  Surgeon: Mandeep Park MD;  [facility]    REPLACE JEJUNOSTOMY TUBE, PERCUTANEOUS N/A 2024    Procedure: REPLACEMENT, JEJUNOSTOMY TUBE, PERCUTANEOUS;  Surgeon: Francisco Dodson MD;  [facility]    transphenoidal pituitary resection  1990    [facility]  DELIVERY ONLY  1996    [facility]  DELIVERY ONLY  1998    repeat c section with incidental cystotomy with repair    [facility] EXCIS PITUITARY,TRANSNASAL/SEPTAL  1980     pituitary tumor removed for diabetes insipidus    [facility] TOTAL ABDOM HYSTERECTOMY  1999    w/ bilateral salpingoophorectomy       Allergies   Allergen Reactions    Apple Juice Anaphylaxis    Corticosteroids Other (See Comments)     All oral, IV and injectable steroids are contraindicated (unless in life threatening situations) in [facility] [employer] transplant recipients. They can cause irreversible loss of islet cell function. Please contact patient's transplant care coordinator ADI Gaffney RN at [phone]/pager [phone] and/or endocrinologist prior to administration.      Depakote [Divalproex Sodium] Other (See Comments)     Chest pain    Zithromax [Azithromycin Dihydrate] Anaphylaxis     Noted in 08 ER    Bromfenac      Other reaction(s): Headache    Codeine      Other reaction(s): Hallucinations    Darvocet [Propoxyphene N-Apap] Itching    Morphine Nausea and Vomiting and Rash    Nalbuphine Hcl Rash     RASH :nubaine    Zosyn [Piperacillin-Tazobactam In Dex] Rash     Possible allergy, did have a diffuse rash that seemed drug related but could have also been related to soap in the hospital.     Bactrim [Sulfamethoxazole W-Trimethoprim] Other (See Comments) and Nausea and Vomiting     Severely low liver function.    Statins Other (See Comments)     Previous liver issues, risks vs benefits felt to not warrant statin.  Discussed Oct 2022 visit    Tape [Adhesive Tape] Blisters    Tramadol     Zofran [Ondansetron] Other (See Comments)     migraine    Flagyl [Metronidazole] Hives and Rash      Social History     Tobacco Use    Smoking status: Former     Current packs/day: 0.00     Average packs/day: 0.5 packs/day for 6.3 years (3.2 ttl pk-yrs)     Types: Cigarettes     Start date: 1985     Quit date: 1992     Years since quittin.8    Smokeless tobacco: Not on file    Tobacco comments:     no 2nd hand   Substance Use Topics    Alcohol use: Not Currently     Alcohol/week: 3.0 - 6.0 standard drinks of  alcohol     Types: 1 - 2 Glasses of wine, 1 - 2 Cans of beer, 1 - 2 Shots of liquor per week     Comment: none since IGG      Wt Readings from Last 1 Encounters:   10/08/24 66.5 kg (146 lb 9.6 oz)        Anesthesia Evaluation   Pt has had prior anesthetic. Type: General.    History of anesthetic complications  - PONV.      ROS/MED HX  ENT/Pulmonary:    (-) tobacco use, asthma and COPD   Neurologic:     (+)      migraines,                          Cardiovascular:     (+)  - -   -  - -                                 Previous cardiac testing   Echo: Date: 2023 Results:    Global and regional left ventricular function is normal with an EF of 55-60%.  Global right ventricular function is normal.  No significant valve abnormalities.  There is a left sided septal pouch and a small patent foramen ovale with  intermittent right to left shunt.    Stress Test:  Date: Results:    ECG Reviewed:  Date: Results:    Cath:  Date: Results:   (-) CAD, taking anticoagulants/antiplatelets and arrhythmias   METS/Exercise Tolerance: >4 METS    Hematologic:       Musculoskeletal:       GI/Hepatic: Comment: Chronic constipation     (+) GERD,     hiatal hernia,              Renal/Genitourinary:       Endo:     (+) type I DM,         thyroid problem,            Psychiatric/Substance Use: Comment: Buprenorphine     (+)    H/O chronic opiod use .     Infectious Disease:       Malignancy:       Other:            Physical Exam    Airway        Mallampati: II   TM distance: > 3 FB   Neck ROM: full   Mouth opening: > 3 cm    Respiratory Devices and Support         Dental       (+) Completely normal teeth      Cardiovascular   cardiovascular exam normal          Pulmonary   pulmonary exam normal                OUTSIDE LABS:  CBC:   Lab Results   Component Value Date    WBC 6.6 07/24/2024    WBC 7.8 07/23/2024    HGB 12.0 07/24/2024    HGB 12.6 07/23/2024    HCT 36.6 07/24/2024    HCT 39.8 07/23/2024     07/24/2024     07/23/2024      BMP:   Lab Results   Component Value Date     07/25/2024     07/24/2024    POTASSIUM 4.7 07/25/2024    POTASSIUM 4.6 07/24/2024    CHLORIDE 105 07/25/2024    CHLORIDE 101 07/24/2024    CO2 29 07/25/2024    CO2 28 07/24/2024    BUN 19.5 07/25/2024    BUN 21.8 (H) 07/24/2024    CR 0.6 09/04/2024    CR 0.54 07/25/2024     (H) 09/11/2024     (H) 09/11/2024     COAGS:   Lab Results   Component Value Date    PTT 29 01/07/2017    INR 1.01 07/25/2024    FIBR 225 12/28/2016     POC:   Lab Results   Component Value Date    BGM 85 08/24/2018    HCG Negative 12/19/2016     HEPATIC:   Lab Results   Component Value Date    ALBUMIN 3.5 07/25/2024    PROTTOTAL 5.9 (L) 07/25/2024    ALT 20 07/25/2024    AST 21 07/25/2024     (H) 06/04/2023    ALKPHOS 86 07/25/2024    BILITOTAL 0.3 07/25/2024     OTHER:   Lab Results   Component Value Date    PH 7.49 (H) 12/28/2016    LACT 0.9 07/23/2024    A1C 5.4 07/23/2024    KASSI 8.4 (L) 07/25/2024    PHOS 3.9 07/25/2024    MAG 2.0 07/25/2024    LIPASE 6 (L) 06/04/2023    AMYLASE 45 01/23/2017    TSH 0.89 11/12/2023    T4 1.13 03/23/2018    CRP 90.0 (H) 12/29/2016    SED 10 09/16/2021       Anesthesia Plan    ASA Status:  2    NPO Status:  NPO Appropriate    Anesthesia Type: General.   Induction: Intravenous.   Maintenance: Inhalation.   Techniques and Equipment:     - Lines/Monitors: 2nd IV     Consents    Anesthesia Plan(s) and associated risks, benefits, and realistic alternatives discussed. Questions answered and patient/representative(s) expressed understanding.     - Discussed: Risks, Benefits and Alternatives for BOTH SEDATION and the PROCEDURE were discussed     - Discussed with:  Patient            Postoperative Care    Pain management: IV analgesics, Multi-modal analgesia, Peripheral nerve block (Single Shot).   PONV prophylaxis: Scopolamine patch, Background Propofol Infusion, Promethazine or metoclopramide     Comments:               Maricarmen MARAVILLA  MD Juancarlos    I have reviewed the pertinent notes and labs in the chart from the past 30 days and (re)examined the patient.  Any updates or changes from those notes are reflected in this note.

## 2024-10-11 NOTE — LETTER
Transition Communication Hand-off for Care Transitions to Next Level of Care Provider    Name: Dinora Mcghee  : 1965  MRN #: [id number]  Primary Care Provider: Juan Jose Gomez     Primary Clinic: [address] [place] [id number]     Reason for Hospitalization:  Slow transit constipation [K59.01]  Admit Date/Time: 10/11/2024 10:28 AM  Discharge Date: ***  Payor Source: Payor: BCBS / Plan: [place] / Product Type: Indemnity /     Readmission Assessment Measure (MJ) Risk Score/category: ***    Plan of Care Goals/Milestone Events:   Patient Concerns: ***   Patient Goals:   Short-term ***   Long-term ***   Medical Goals   Short-term ***   Long-term ***         Reason for Communication Hand-off Referral: {CCREASONCMCTNHANDOFFREFERRALCTS:37254}    Discharge Plan:       Concern for non-adherence with plan of care:   Y/N ***  Discharge Needs Assessment:  Needs      Flowsheet Row Most Recent Value   Equipment Currently Used at Home none   # of Referrals Placed by  Internal Clinic Care Coordination, Homecare            Already enrolled in Tele-monitoring program and name of program:  ***  Follow-up specialty is recommended: {YES / NO:05562}    Follow-up plan:    Future Appointments   Date Time Provider Department Center   10/31/2024  4:30 PM Aleyda Ceja RN [facility]   2024  2:00 PM Rachelle العلي APRN [facility]   2024  8:00 AM Vijaya Genao MD [facility]   2024  6:00 PM Kaylene Branch MD [facility]   2024  2:00 PM Juan Jose Gomez MD [facility]   2024  1:00 PM Gloria Melissa MD [facility]   2025  2:30 PM Rachelle العلي APRN [facility]   3/21/2025  4:00 PM Juan Jose Gomez MD [facility]       Any outstanding tests or procedures:        Referrals       Future Labs/Procedures    Primary Care - Care Coordination Referral     Process Instructions:    Services are provided by a Care  "Coordinator for people with complex needs such as: medical, social, or financial troubles.  The Care Coordinator works with the patient and their Primary Care Provider to determine health goals, obtain resources, achieve outcomes, and develop care plans that help coordinate the patient's care.     Comments:         Home Infusion Referral     Comments:    Cycled TPN 7 days a week.            Supplies       Future Labs/Procedures    Miscellaneous DME Order     Process Instructions:    Please limit the use of the Miscellaneous Order as a replacement for already existing DME orders. Please use an existing DME order when ever possible.      Comments:    Your Medical Supplier will need your surgeon's name, phone and fax number    Clinic:                     Phone                            Fax  Colorectal Surgery:    [phone]                                                                                                   Authorizing MD: Dr. Branch    Quantity of pouches:   20  /mo.    Request the following supplies:    Ileostomy  Leandro    2 piece flat wafer 57mm  # [id number]                           2 piece opaque Fecal pouch without Filter 57mm- # [id number]  Accessories  2\" barrier ring #[id number]     Adapt powder #7906    No sting film barrier # 3345                           Adapt universal adhesive remover #2160                          Adapt M-9 Spray room deodorizer #0338                           Marathon barrier extenders #63970    Jtube:   Marathon    2 piece flat wafer 57mm  # 86927   Urine pouch 57mm- # 08583  Accessories      Eliseo paste #[id number]    Adapt powder #7906    No sting film barrier # 3345    Change 2-3x a week                                   Change your pouch 2 times a week, more often if leaking.        DME Documentation:   Describe the reason for need to support medical necessity: s/p ileostomy and J-tube revision with leaking around J-tube    I, the undersigned, certify that " the above prescribed supplies are medically necessary for this patient and is both reasonable and necessary in reference to accepted standards of medical and necessary in reference to accepted standards of medical practice in the treatment of this patient's condition and is not prescribed as a convenience.            Key Recommendations:      Sweta Stoll RN    AVS/Discharge Summary is the source of truth; this is a helpful guide for improved communication of patient story

## 2024-10-11 NOTE — BRIEF OP NOTE
[facility]    Brief Operative Note    Pre-operative diagnosis: Slow transit constipation [K59.01]  Post-operative diagnosis Same as pre-operative diagnosis    Procedure: Exploratory laparotomy, extensive lysis of adhesions, revision of Jtube and small bowel resection, TOTAL ABDOMINAL COLECTOMY WITH ILEORECTAL ANASTAMOSIS, DIVERTING LOOP ILEOSTOMY, flexible sigmoidoscopy, N/A - Abdomen    Surgeon: Surgeons and Role:     * Kaylene Branch MD - Primary     * Annette Johnson MD - Fellow - Assisting  Anesthesia: General with Block   Estimated Blood Loss: Less than 100 ml    Drains: None  Specimens:   ID Type Source Tests Collected by Time Destination   1 : Omentum Tissue Omentum SURGICAL PATHOLOGY EXAM Kaylene Branch MD 10/11/2024  2:10 PM    2 : Colon and Ileum Tissue Other SURGICAL PATHOLOGY EXAM Kaylene Branch MD 10/11/2024  3:23 PM    3 : Jejenum and J-tube side Tissue Small Intestine, Jejunum SURGICAL PATHOLOGY EXAM Kaylene Branch MD 10/11/2024  4:01 PM    .  Complications: None.    Findings:   - Dense adhesions throughout the abdomen  - Total abdominal colectomy  - Enterotomy at site of prior J-tube, small bowel resection with hand sewn anastomosis  - Replacement of J-tube (14 Fr in the left abdomen) and has prior G-tube in the right upper abdomen        > J tube has 7mL sterile water in the balloon   - Ileorectal anastomosis with DLI     Plan:  - NPO  - Gtube to gravity, jtube to gravity   - Keep rose   - Multimodal pain control   - WOCN

## 2024-10-12 LAB
ALBUMIN SERPL BCG-MCNC: 3.5 G/DL (ref 3.5–5.2)
ALP SERPL-CCNC: 71 U/L (ref 40–150)
ALT SERPL W P-5'-P-CCNC: 19 U/L (ref 0–50)
ANION GAP SERPL CALCULATED.3IONS-SCNC: 10 MMOL/L (ref 7–15)
AST SERPL W P-5'-P-CCNC: 31 U/L (ref 0–45)
BILIRUB DIRECT SERPL-MCNC: 0.27 MG/DL (ref 0–0.3)
BILIRUB SERPL-MCNC: 0.9 MG/DL
BUN SERPL-MCNC: 12.2 MG/DL (ref 6–20)
CALCIUM SERPL-MCNC: 8.4 MG/DL (ref 8.8–10.4)
CHLORIDE SERPL-SCNC: 99 MMOL/L (ref 98–107)
CREAT SERPL-MCNC: 0.61 MG/DL (ref 0.51–0.95)
EGFRCR SERPLBLD CKD-EPI 2021: >90 ML/MIN/1.73M2
ERYTHROCYTE [DISTWIDTH] IN BLOOD BY AUTOMATED COUNT: 12.7 % (ref 10–15)
GLUCOSE BLDC GLUCOMTR-MCNC: 129 MG/DL (ref 70–99)
GLUCOSE BLDC GLUCOMTR-MCNC: 149 MG/DL (ref 70–99)
GLUCOSE BLDC GLUCOMTR-MCNC: 165 MG/DL (ref 70–99)
GLUCOSE BLDC GLUCOMTR-MCNC: 230 MG/DL (ref 70–99)
GLUCOSE BLDC GLUCOMTR-MCNC: 238 MG/DL (ref 70–99)
GLUCOSE SERPL-MCNC: 243 MG/DL (ref 70–99)
HCO3 SERPL-SCNC: 26 MMOL/L (ref 22–29)
HCT VFR BLD AUTO: 33.8 % (ref 35–47)
HGB BLD-MCNC: 10.6 G/DL (ref 11.7–15.7)
INR PPP: 1.24 (ref 0.85–1.15)
MAGNESIUM SERPL-MCNC: 1.8 MG/DL (ref 1.7–2.3)
MCH RBC QN AUTO: 29.8 PG (ref 26.5–33)
MCHC RBC AUTO-ENTMCNC: 31.4 G/DL (ref 31.5–36.5)
MCV RBC AUTO: 95 FL (ref 78–100)
PHOSPHATE SERPL-MCNC: 2.8 MG/DL (ref 2.5–4.5)
PLATELET # BLD AUTO: 309 10E3/UL (ref 150–450)
POTASSIUM SERPL-SCNC: 4.6 MMOL/L (ref 3.4–5.3)
PREALB SERPL-MCNC: 11.3 MG/DL (ref 20–40)
PROT SERPL-MCNC: 5.6 G/DL (ref 6.4–8.3)
RBC # BLD AUTO: 3.56 10E6/UL (ref 3.8–5.2)
SODIUM SERPL-SCNC: 135 MMOL/L (ref 135–145)
WBC # BLD AUTO: 11.9 10E3/UL (ref 4–11)

## 2024-10-12 PROCEDURE — 120N000002 HC R&B MED SURG/OB UMMC

## 2024-10-12 PROCEDURE — 250N000013 HC RX MED GY IP 250 OP 250 PS 637: Performed by: COLON & RECTAL SURGERY

## 2024-10-12 PROCEDURE — 82247 BILIRUBIN TOTAL: CPT | Performed by: COLON & RECTAL SURGERY

## 2024-10-12 PROCEDURE — 250N000012 HC RX MED GY IP 250 OP 636 PS 637: Performed by: STUDENT IN AN ORGANIZED HEALTH CARE EDUCATION/TRAINING PROGRAM

## 2024-10-12 PROCEDURE — 84134 ASSAY OF PREALBUMIN: CPT | Performed by: COLON & RECTAL SURGERY

## 2024-10-12 PROCEDURE — 250N000011 HC RX IP 250 OP 636

## 2024-10-12 PROCEDURE — 82248 BILIRUBIN DIRECT: CPT | Performed by: COLON & RECTAL SURGERY

## 2024-10-12 PROCEDURE — 250N000011 HC RX IP 250 OP 636: Performed by: STUDENT IN AN ORGANIZED HEALTH CARE EDUCATION/TRAINING PROGRAM

## 2024-10-12 PROCEDURE — 258N000003 HC RX IP 258 OP 636: Performed by: STUDENT IN AN ORGANIZED HEALTH CARE EDUCATION/TRAINING PROGRAM

## 2024-10-12 PROCEDURE — 250N000011 HC RX IP 250 OP 636: Performed by: COLON & RECTAL SURGERY

## 2024-10-12 PROCEDURE — 83735 ASSAY OF MAGNESIUM: CPT | Performed by: COLON & RECTAL SURGERY

## 2024-10-12 PROCEDURE — 258N000003 HC RX IP 258 OP 636: Performed by: SURGERY

## 2024-10-12 PROCEDURE — 250N000011 HC RX IP 250 OP 636: Performed by: SURGERY

## 2024-10-12 PROCEDURE — 85027 COMPLETE CBC AUTOMATED: CPT | Performed by: STUDENT IN AN ORGANIZED HEALTH CARE EDUCATION/TRAINING PROGRAM

## 2024-10-12 PROCEDURE — 84100 ASSAY OF PHOSPHORUS: CPT | Performed by: COLON & RECTAL SURGERY

## 2024-10-12 PROCEDURE — 3E0436Z INTRODUCTION OF NUTRITIONAL SUBSTANCE INTO CENTRAL VEIN, PERCUTANEOUS APPROACH: ICD-10-PCS | Performed by: COLON & RECTAL SURGERY

## 2024-10-12 PROCEDURE — 85610 PROTHROMBIN TIME: CPT | Performed by: COLON & RECTAL SURGERY

## 2024-10-12 PROCEDURE — 250N000009 HC RX 250: Performed by: COLON & RECTAL SURGERY

## 2024-10-12 PROCEDURE — 999N000248 HC STATISTIC IV INSERT WITH US BY RN

## 2024-10-12 PROCEDURE — 250N000013 HC RX MED GY IP 250 OP 250 PS 637: Performed by: SURGERY

## 2024-10-12 PROCEDURE — 99254 IP/OBS CNSLTJ NEW/EST MOD 60: CPT | Mod: GC | Performed by: INTERNAL MEDICINE

## 2024-10-12 PROCEDURE — 250N000013 HC RX MED GY IP 250 OP 250 PS 637: Performed by: STUDENT IN AN ORGANIZED HEALTH CARE EDUCATION/TRAINING PROGRAM

## 2024-10-12 PROCEDURE — 250N000012 HC RX MED GY IP 250 OP 636 PS 637: Performed by: INTERNAL MEDICINE

## 2024-10-12 PROCEDURE — 250N000009 HC RX 250: Performed by: SURGERY

## 2024-10-12 PROCEDURE — B4185 PARENTERAL SOL 10 GM LIPIDS: HCPCS | Performed by: COLON & RECTAL SURGERY

## 2024-10-12 RX ORDER — DEXTROSE MONOHYDRATE 100 MG/ML
INJECTION, SOLUTION INTRAVENOUS CONTINUOUS PRN
Status: DISCONTINUED | OUTPATIENT
Start: 2024-10-12 | End: 2024-10-28 | Stop reason: HOSPADM

## 2024-10-12 RX ORDER — GABAPENTIN 250 MG/5ML
100 SOLUTION ORAL 3 TIMES DAILY
Status: DISCONTINUED | OUTPATIENT
Start: 2024-10-12 | End: 2024-10-13

## 2024-10-12 RX ORDER — ACETAMINOPHEN 325 MG/10.15ML
975 LIQUID ORAL EVERY 8 HOURS
Status: DISCONTINUED | OUTPATIENT
Start: 2024-10-12 | End: 2024-10-12

## 2024-10-12 RX ORDER — ACETAMINOPHEN 325 MG/10.15ML
650 LIQUID ORAL 4 TIMES DAILY
Status: DISCONTINUED | OUTPATIENT
Start: 2024-10-12 | End: 2024-10-17

## 2024-10-12 RX ORDER — CALCIUM GLUCONATE 20 MG/ML
2 INJECTION, SOLUTION INTRAVENOUS ONCE
Status: COMPLETED | OUTPATIENT
Start: 2024-10-12 | End: 2024-10-12

## 2024-10-12 RX ORDER — HYDROMORPHONE HYDROCHLORIDE 1 MG/ML
0.5 INJECTION, SOLUTION INTRAMUSCULAR; INTRAVENOUS; SUBCUTANEOUS
Status: DISCONTINUED | OUTPATIENT
Start: 2024-10-12 | End: 2024-10-12

## 2024-10-12 RX ORDER — DEXTROSE MONOHYDRATE 50 MG/ML
INJECTION, SOLUTION INTRAVENOUS DAILY PRN
Status: DISCONTINUED | OUTPATIENT
Start: 2024-10-12 | End: 2024-10-12

## 2024-10-12 RX ORDER — METHOCARBAMOL 100 MG/ML
500 INJECTION, SOLUTION INTRAMUSCULAR; INTRAVENOUS ONCE
Status: COMPLETED | OUTPATIENT
Start: 2024-10-12 | End: 2024-10-12

## 2024-10-12 RX ORDER — BACLOFEN 10 MG/1
10-20 TABLET ORAL 3 TIMES DAILY
Status: DISCONTINUED | OUTPATIENT
Start: 2024-10-12 | End: 2024-10-28 | Stop reason: HOSPADM

## 2024-10-12 RX ORDER — LEVOTHYROXINE SODIUM 75 UG/1
150 TABLET ORAL DAILY
Status: DISCONTINUED | OUTPATIENT
Start: 2024-10-13 | End: 2024-10-17

## 2024-10-12 RX ORDER — HYDROMORPHONE HYDROCHLORIDE 1 MG/ML
0.3 INJECTION, SOLUTION INTRAMUSCULAR; INTRAVENOUS; SUBCUTANEOUS
Status: DISCONTINUED | OUTPATIENT
Start: 2024-10-12 | End: 2024-10-12

## 2024-10-12 RX ADMIN — PROMETHAZINE HYDROCHLORIDE 25 MG: 25 INJECTION, SOLUTION INTRAMUSCULAR; INTRAVENOUS at 16:06

## 2024-10-12 RX ADMIN — BACLOFEN 20 MG: 10 TABLET ORAL at 10:43

## 2024-10-12 RX ADMIN — INSULIN ASPART 2 UNITS: 100 INJECTION, SOLUTION INTRAVENOUS; SUBCUTANEOUS at 03:52

## 2024-10-12 RX ADMIN — Medication: at 11:31

## 2024-10-12 RX ADMIN — SODIUM CHLORIDE, POTASSIUM CHLORIDE, SODIUM LACTATE AND CALCIUM CHLORIDE: 600; 310; 30; 20 INJECTION, SOLUTION INTRAVENOUS at 08:07

## 2024-10-12 RX ADMIN — GABAPENTIN 100 MG: 250 SOLUTION ORAL at 20:28

## 2024-10-12 RX ADMIN — HYDROMORPHONE HYDROCHLORIDE 0.5 MG: 1 INJECTION, SOLUTION INTRAMUSCULAR; INTRAVENOUS; SUBCUTANEOUS at 02:25

## 2024-10-12 RX ADMIN — ACETAMINOPHEN 975 MG: 325 TABLET, FILM COATED ORAL at 07:52

## 2024-10-12 RX ADMIN — INSULIN GLARGINE 3 UNITS: 100 INJECTION, SOLUTION SUBCUTANEOUS at 16:06

## 2024-10-12 RX ADMIN — HYDROMORPHONE HYDROCHLORIDE 0.4 MG: 0.2 INJECTION, SOLUTION INTRAMUSCULAR; INTRAVENOUS; SUBCUTANEOUS at 00:27

## 2024-10-12 RX ADMIN — SMOFLIPID 250 ML: 6; 6; 5; 3 INJECTION, EMULSION INTRAVENOUS at 21:50

## 2024-10-12 RX ADMIN — OXYCODONE HYDROCHLORIDE 10 MG: 10 TABLET ORAL at 03:46

## 2024-10-12 RX ADMIN — PROMETHAZINE HYDROCHLORIDE 25 MG: 25 INJECTION, SOLUTION INTRAMUSCULAR; INTRAVENOUS at 20:24

## 2024-10-12 RX ADMIN — HYDROMORPHONE HYDROCHLORIDE 0.5 MG: 1 INJECTION, SOLUTION INTRAMUSCULAR; INTRAVENOUS; SUBCUTANEOUS at 05:09

## 2024-10-12 RX ADMIN — Medication 60 MG: at 20:30

## 2024-10-12 RX ADMIN — Medication: at 18:54

## 2024-10-12 RX ADMIN — METHOCARBAMOL 500 MG: 100 INJECTION INTRAMUSCULAR; INTRAVENOUS at 03:47

## 2024-10-12 RX ADMIN — MAGNESIUM SULFATE HEPTAHYDRATE: 500 INJECTION, SOLUTION INTRAMUSCULAR; INTRAVENOUS at 20:22

## 2024-10-12 RX ADMIN — HYDROMORPHONE HYDROCHLORIDE 0.5 MG: 1 INJECTION, SOLUTION INTRAMUSCULAR; INTRAVENOUS; SUBCUTANEOUS at 09:31

## 2024-10-12 RX ADMIN — PROMETHAZINE HYDROCHLORIDE 25 MG: 25 INJECTION, SOLUTION INTRAMUSCULAR; INTRAVENOUS at 11:23

## 2024-10-12 RX ADMIN — BACLOFEN 20 MG: 10 TABLET ORAL at 20:29

## 2024-10-12 RX ADMIN — ACETAMINOPHEN 650 MG: 325 SOLUTION ORAL at 16:06

## 2024-10-12 RX ADMIN — ACETAMINOPHEN 650 MG: 325 SOLUTION ORAL at 20:28

## 2024-10-12 RX ADMIN — HYDROMORPHONE HYDROCHLORIDE 0.5 MG: 1 INJECTION, SOLUTION INTRAMUSCULAR; INTRAVENOUS; SUBCUTANEOUS at 07:03

## 2024-10-12 RX ADMIN — PANTOPRAZOLE SODIUM 40 MG: 40 INJECTION, POWDER, FOR SOLUTION INTRAVENOUS at 08:26

## 2024-10-12 RX ADMIN — INSULIN ASPART 1 UNITS: 100 INJECTION, SOLUTION INTRAVENOUS; SUBCUTANEOUS at 16:23

## 2024-10-12 RX ADMIN — HEPARIN SODIUM 5000 UNITS: 5000 INJECTION, SOLUTION INTRAVENOUS; SUBCUTANEOUS at 16:06

## 2024-10-12 RX ADMIN — BACLOFEN 20 MG: 10 TABLET ORAL at 14:35

## 2024-10-12 RX ADMIN — CALCIUM GLUCONATE 2 G: 20 INJECTION, SOLUTION INTRAVENOUS at 10:39

## 2024-10-12 RX ADMIN — INSULIN ASPART 2 UNITS: 100 INJECTION, SOLUTION INTRAVENOUS; SUBCUTANEOUS at 07:55

## 2024-10-12 RX ADMIN — INSULIN ASPART 1 UNITS: 100 INJECTION, SOLUTION INTRAVENOUS; SUBCUTANEOUS at 11:58

## 2024-10-12 RX ADMIN — OXYCODONE HYDROCHLORIDE 10 MG: 10 TABLET ORAL at 07:52

## 2024-10-12 RX ADMIN — PROCHLORPERAZINE EDISYLATE 10 MG: 5 INJECTION INTRAMUSCULAR; INTRAVENOUS at 02:30

## 2024-10-12 ASSESSMENT — ACTIVITIES OF DAILY LIVING (ADL)
ADLS_ACUITY_SCORE: 31
ADLS_ACUITY_SCORE: 27
ADLS_ACUITY_SCORE: 27
ADLS_ACUITY_SCORE: 33
ADLS_ACUITY_SCORE: 31
ADLS_ACUITY_SCORE: 27
ADLS_ACUITY_SCORE: 31
ADLS_ACUITY_SCORE: 27
ADLS_ACUITY_SCORE: 31
ADLS_ACUITY_SCORE: 27
ADLS_ACUITY_SCORE: 27
ADLS_ACUITY_SCORE: 31
ADLS_ACUITY_SCORE: 33
ADLS_ACUITY_SCORE: 31
ADLS_ACUITY_SCORE: 27
ADLS_ACUITY_SCORE: 31
ADLS_ACUITY_SCORE: 27
ADLS_ACUITY_SCORE: 31
ADLS_ACUITY_SCORE: 27
ADLS_ACUITY_SCORE: 31
ADLS_ACUITY_SCORE: 31

## 2024-10-12 NOTE — PLAN OF CARE
"Goal Outcome Evaluation:    Plan of Care Reviewed With: patient, spouse  Overall Patient Progress: improving    [id number] and ileorectal anastomosis, diverting loop ileostomy on 10/11 for slow transit constipation    Tachy in the 100s, now in the 90s and soft BPs on RA  LS clear-IS encouraged.  Hypo BS, ileostomy with bilious output, no flatus noted in pouch.  No void after rose  was removed. DBladder scanned for 744ml, straight cath'ed for 800ml.  NPO, continuous TPN, crushed meds given thru G-tube and liquid meds thru J-tube per pt's preference.  SBA/ assist of 1 with walks  Pain in abd  controlled with PCA dilaudid, tylenol, baclofen-refused gabapentin \"does not work for me\"  On scheduled phenergan IV for nausea.  Scop patch behind rt ear.  J-tube to gravity, G-tube to gravity when not in use for crushed meds.  Midline incision with intact dressing.  Left AC IV  infiltrated. Getting PIV replaced then increase PCA dose as ordered.  CVC with TPN infusing.              "

## 2024-10-12 NOTE — PROGRESS NOTES
"  Colorectal Surgery Progress Note  Surgery Cross-Cover  Post Op Check    10/12/2024    Dinora Mcghee is a 59 year old female POD#0 s/p Procedure(s):  Exploratory laparotomy, extensive lysis of adhesions, revision of Jtube and small bowel resection, TOTAL ABDOMINAL COLECTOMY WITH ILEORECTAL ANASTAMOSIS, DIVERTING LOOP ILEOSTOMY, flexible sigmoidoscopy for Pre-Op Diagnosis Codes:     * Slow transit constipation [K59.01]    Pt reports their pain is not well controlled on current regimen. Reports nausea and has been declining zofran because she says it gives her migraines. Denies nausea, SOB, chest pain, or dizziness. Patient is passing liquid output from ileostomy but no formed stool yet. Voiding with rose in place.     /78   Pulse 111   Temp 98.1  F (36.7  C) (Oral)   Resp 17   Ht 1.727 m (5' 8\")   Wt 62.7 kg (138 lb 3.7 oz)   SpO2 98%   BMI 21.02 kg/m      Gen: A&O x4, NAD   Chest: breathing non-labored    Abdomen: soft, abdomen tender throughout, greater in LUQ and LLQ, ileostomy with liquid output  Incision: midline incision dressing clean, dry, intact   Extremities: warm and well perfused  Devices: GJ tube, KELI drain with serosanguinous output     A/P: Significant abdominal pain and nausea in post-operative course.    Plan:  -NPO, IVF  -G tube to gravity, J tube to gravity  -Keep rose   -Compazine 10mg q6h prn for nausea   -Hold zofran given history of migraines with zofran use  -IV robaxin 500mg x1   -Increased dilaudid from 0.2-0.4mg to 0.3-0.5mg q2h prn    Herminia Garza MD  General Surgery PGY-1      "

## 2024-10-12 NOTE — ANESTHESIA POSTPROCEDURE EVALUATION
Patient: Dinora Mcghee    Procedure: Procedure(s):  Exploratory laparotomy, extensive lysis of adhesions, revision of Jtube and small bowel resection, TOTAL ABDOMINAL COLECTOMY WITH ILEORECTAL ANASTAMOSIS, DIVERTING LOOP ILEOSTOMY, flexible sigmoidoscopy       Anesthesia Type:  General    Note:  Disposition: Inpatient   Postop Pain Control: Uneventful            Sign Out: Well controlled pain   PONV: No   Neuro/Psych: Uneventful            Sign Out: Acceptable/Baseline neuro status   Airway/Respiratory: Uneventful            Sign Out: Acceptable/Baseline resp. status   CV/Hemodynamics: Uneventful            Sign Out: Acceptable CV status; No obvious hypovolemia; No obvious fluid overload   Other NRE: NONE   DID A NON-ROUTINE EVENT OCCUR? No           Last vitals:  Vitals Value Taken Time   /66 10/11/24 1915   Temp     Pulse 94 10/11/24 1924   Resp 17 10/11/24 1924   SpO2 100 % 10/11/24 1923   Vitals shown include unfiled device data.    Electronically Signed By: Josse Reed MD  October 11, 2024  7:25 PM

## 2024-10-12 NOTE — CONSULTS
Inpatient Diabetes Management Service : New Consult Note     Patient: Dinora Mcghee   YOB: 1965    MRN: [id number]     Date of Consult : 10/12/2024   Date of Admission: 10/11/2024     Reason for Consult: Management of pancreatogenic DM, on TPN and expected to be on TF   Consult Requestor: Sonja Rae           History of Present Illness:     Dinora Mcghee is a 59 year old with  pancreatogenic diabetes  s/p pancreatectomy and TPAIT in 2016,complicated by gastroparesis,  as well as a comorbid history of HTN,HLD, Hypothyroidism, chronic nausea/and constipation, chronic malnutrition and TPN and Tube feeds dependent , who was admitted on 10/11/2024 for exploratory laparotomy, extensive lysis of adhesions, revision of J tube,and small bowel resection. Inpatient Diabetes Service consulted for management of diabetes in the setting of continuous TPN use and expected TF use in future..     History obtained via the patient and  chart review.    Patient underwent above mentioned procedure on 10/11/24, and was started on continuous TPN, with medium dose correction insulin novolog ordered every 4 hours. Her BG is running between 160-240s. She is NPO status.     Reported she was diagnosed with diabetes after pancreatectomy and TPAIT in 2016, she required insulin for 3 years after surgery and was then off of insulin and then re-started again couple of years ago.   She uses TPN x 12 hours, 3 days/week and Tube feeds x 13 hours, 7 days/week.    Takes insulin levemir 3 units daily and novolog 1 unit if BG is 175-275, and 2 units if >275 up to every 4 hours as needed. Reports some times she keeps on  giving correction whole night but other times does it once or not require it all. She does both TPN and Tube feeds at night. Her oral intake is minimal. She have 2 entral tube, G and J, one for medications and decompression and other for tube feeds.    Reports she use to have severe hypoglycemic episodes at night,   which she would treat by micro bolusing her glucagon injection but since starting TPN about 5 months ago, she didn't have further hypoglycemic episodes.     Patient is  known to the Inpatient Diabetes Service from past admission(s).    Current inpatient regimen:  Insulin levemir 3 units ( Scheduled in the evening ) and medium resistance correction scale q4    BG at time of consult: 238 mg/dl    Other Active/Contributory Medical Problems: Hypothyroidism  Chronic malnutrition- TPN and PEG tube dependent  H/o  f HTN,HLD, Hypothyroidism, chronic nausea/and constipation,   Planned Procedures/Surgeries: None    Relevant Labs on Admission:  if contributory, otherwise see labs below  Renal function: Creatinine (0.61), eGFR (>90)    BMP: Na (135), K (4.6) Bicarb (26), Anion Gap (10), Glucose (243)  Hgb: 10.6   A1c: 5.4 % on 07/23/24  GAD65 antibody - <5 ( 11/29/2016) ,islet antibody- <1:4 (11/29/2016), insulin antibody- <0.4 and c-peptide 2.5 on 02/17/22.      Inpatient Glucose Trends:           Diabetes History:   Diabetes Type and Duration: Since 2016, s/p pancreatectomy and TPAI    PTA Medication Regimen: Takes insulin levemir 3 units daily and novolog 1 unit if BG is 175-275, and 2 units if >275 up to every 4 hours as needed. Reports some times she keeps on  giving correction whole night but other times does it once or not require it all.  Historical Diabetes Medications: As above    Glucose monitoring device and frequency: Checks with [email or web address] 3  Outpatient Diabetes Provider: None  Formal Diabetes Education/Educator: None    ------------------------  Complications:  Unknown  peripheral neuropathy, + gastroparesis,     History of DKA: None  Hypoglycemia PTA:   - Frequency: Frequent hypoglycemia, 5 months prior, stopped since starting TPN about 5 months ago  - Severity: Mild to moderate  - Awareness:  intact    Safety Kit:   - Glucagon: Injection    Diet/Lifestyle: Minimal oral intake    Ability to [place] Prescribed  Regimen:  Yes   Food/Housing Insecurity: No concerns          Medications Impacting Glycemia:    Steroids: None  D5W containing solutions/medications: None  Other medications impacting glucose: NOne         Diet/Nutrition:    Orders Placed This Encounter      NPO for Medical/Clinical Reasons Except for: Meds, Ice Chips     Supplements:  NPO  TF: No  TPN: Continuous TPN on.          Review of Systems:      Negative except abdominal pain.            Past Medical History:       Past Medical History:   Diagnosis Date    Allergic rhinitis, cause unspecified     Allergy to other foods     strawberries, apples, celeries, alice, watermelon    Arthritis     left knee    Choledocholithiasis     long after cholecystectomy    Chronic abdominal pain     Chronic constipation     Chronic nausea     Chronic pancreatitis (H)     Degeneration of lumbar or lumbosacral intervertebral disc     Esophageal reflux     w/ hiatal hernia    Gastroparesis     Hiatal hernia     History of pituitary adenoma     s/p resection    Hypothyroidism     Migraines     Mild hyperlipidemia     On tube feeding diet     presence of GJ tube    Pancreatic disease     PD stricture, 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 endometriosis growth    APPENDECTOMY       SECTION  1993    COLONOSCOPY  2014    COLONOSCOPY N/A 2023    Procedure: COLONOSCOPY, WITH POLYPECTOMY AND BIOPSY;  Surgeon: Percy Chamorro MD;  Location:  GI    ENDOSCOPIC INSERTION TUBE GASTROSTOMY N/A 2021    Procedure: Gastrojejonostomy placement with jejunopexy, PEG tube exchange;  Surgeon: Zackery Montoya MD;   [facility]    ENDOSCOPIC RETROGRADE CHOLANGIOPANCREATOGRAM N/A 06/02/2015    Procedure: ENDOSCOPIC RETROGRADE CHOLANGIOPANCREATOGRAM;  Surgeon: Mandeep Park MD;  [facility]    ENDOSCOPIC RETROGRADE CHOLANGIOPANCREATOGRAM N/A 02/09/2016    Procedure: COMBINED ENDOSCOPIC RETROGRADE CHOLANGIOPANCREATOGRAPHY, PLACE TUBE/STENT;  Surgeon: Mandeep Park MD;  [facility]    ENDOSCOPIC RETROGRADE CHOLANGIOPANCREATOGRAM N/A 03/17/2016    Procedure: COMBINED ENDOSCOPIC RETROGRADE CHOLANGIOPANCREATOGRAPHY, REMOVE FOREIGN BODY OR STENT/TUBE;  Surgeon: Mandeep Park MD;  [facility]    ENDOSCOPIC RETROGRADE CHOLANGIOPANCREATOGRAM N/A 08/02/2016    Procedure: COMBINED ENDOSCOPIC RETROGRADE CHOLANGIOPANCREATOGRAPHY, PLACE TUBE/STENT;  Surgeon: Mandeep Park MD;  [facility]    ENDOSCOPIC RETROGRADE CHOLANGIOPANCREATOGRAM N/A 08/26/2016    Procedure: COMBINED ENDOSCOPIC RETROGRADE CHOLANGIOPANCREATOGRAPHY, REMOVE FOREIGN BODY OR STENT/TUBE;  Surgeon: Mandeep Park MD;  Location:  OR    ENDOSCOPIC ULTRASOUND UPPER GASTROINTESTINAL TRACT (GI) N/A 10/03/2016    Procedure: ENDOSCOPIC ULTRASOUND, ESOPHAGOSCOPY / UPPER GASTROINTESTINAL TRACT (GI);  Surgeon: Guru Jose Rodas MD;  Location:  OR    ENT SURGERY  1989    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    ENTEROSCOPY SMALL BOWEL N/A 02/03/2022    Procedure: ENTEROSCOPY with possible fistula closure;  Surgeon: Francisco Dodson MD;  Location:  GI    ENTEROSCOPY SMALL BOWEL N/A 9/11/2024    Procedure: Upper endoscopy, gastrostomy tube placement and jejunostomy tube exchange;  Surgeon: Zackery Montoya MD;  Location:  OR    ESOPHAGOSCOPY, GASTROSCOPY, DUODENOSCOPY (EGD), COMBINED N/A 06/24/2015    Procedure: COMBINED ESOPHAGOSCOPY, GASTROSCOPY, DUODENOSCOPY (EGD), REMOVE FOREIGN BODY;  Surgeon: Mandeep Park MD;  Location:  GI    ESOPHAGOSCOPY, GASTROSCOPY, DUODENOSCOPY (EGD),  COMBINED N/A 10/25/2015    Procedure: COMBINED ESOPHAGOSCOPY, GASTROSCOPY, DUODENOSCOPY (EGD);  Surgeon: Sammy Amaro MD;  [facility]    ESOPHAGOSCOPY, GASTROSCOPY, DUODENOSCOPY (EGD), COMBINED N/A 10/25/2015    Procedure: COMBINED ESOPHAGOSCOPY, GASTROSCOPY, DUODENOSCOPY (EGD), BIOPSY SINGLE OR MULTIPLE;  Surgeon: Sammy Amaro MD;  [facility]    ESOPHAGOSCOPY, GASTROSCOPY, DUODENOSCOPY (EGD), COMBINED N/A 01/31/2023    Procedure: ESOPHAGOGASTRODUODENOSCOPY (EGD) with peg replacement ;  Surgeon: Mandeep Park MD;  [facility]    ESOPHAGOSCOPY, GASTROSCOPY, DUODENOSCOPY (EGD), COMBINED N/A 7/24/2024    Procedure: Esophagoscopy, gastroscopy, duodenoscopy (EGD), combined;  Surgeon: Zackery Montoya MD;  [facility]    ESOPHAGOSCOPY, GASTROSCOPY, DUODENOSCOPY (EGD), DILATATION, COMBINED      EXCISE LESION TRUNK N/A 04/17/2017    Procedure: EXCISE LESION TRUNK;  Removal of Abdominal Foreign Body;  Surgeon: Nestor Phoenix MD;  [facility]    HC ESOPH/GAS REFLUX TEST W NASAL IMPED >1 HR N/A 11/19/2015    Procedure: ESOPHAGEAL IMPEDENCE FUNCTION TEST WITH 24 HOUR PH GREATER THAN 1 HOUR;  Surgeon: Thiago Apple MD;  [facility]    HC UGI ENDOSCOPY DIAG W BIOPSY  09/17/2008    HC UGI ENDOSCOPY DIAG W BIOPSY  09/27/2012    HC UGI ENDOSCOPY W ESOPHAGEAL DILATION BALLOON <30MM  09/17/2008    HC UGI ENDOSCOPY W EUS N/A 05/05/2015    Procedure: COMBINED ENDOSCOPIC ULTRASOUND, ESOPHAGOSCOPY, GASTROSCOPY, DUODENOSCOPY (EGD);  Surgeon: Wm Dueñas MD;  [facility]    HC WRIST ARTHROSCOP,RELEASE XVERS LIG Bilateral 12/17/2008    INJECT TRANSVERSUS ABDOMINIS PLANE (TAP) BLOCK BILATERAL Left 09/22/2016    Procedure: INJECT TRANSVERSUS ABDOMINIS PLANE (TAP) BLOCK BILATERAL;  Surgeon: Dickson Corrigan MD;  [facility]    INJECT TRIGGER POINT Bilateral 09/08/2022    Procedure: Abdominal trigger point injection with ultrasound;  Surgeon: Monika Mahajan MD;   [facility]    INJECT TRIGGER POINT SINGLE / MULTIPLE 3 OR MORE MUSCLES Right 11/15/2022    Procedure: Trigger point injections right abdomen with ultrasound guidance;  Surgeon: Monika Mahajan MD;  Location: [facility]    IR CHEST PORT PLACEMENT < 5 YRS OF AGE  06/10/2022    IR CVC TUNNEL PLACEMENT > 5 YRS OF AGE  4/15/2024    IR PORT REMOVAL RIGHT  11/09/2023    laparoscopic pineda  01/01/1995    LAPAROSCOPIC HERNIORRHAPHY INCISIONAL N/A 08/23/2018    Procedure: LAPAROSCOPIC HERNIORRHAPHY INCISIONAL;  Laparoscopic Incisional Hernia Repair with Symbotex Mesh Implant;  Surgeon: Nestor Phoenix MD;  [facility]    LAPAROSCOPIC PANCREATECTOMY, TRANSPLANT AUTO ISLET CELL N/A 12/28/2016    Procedure: LAPAROSCOPIC PANCREATECTOMY, TRANSPLANT AUTO ISLET CELL;  Surgeon: Nestor Phoenix MD;  [facility]    LAPAROTOMY EXPLORATORY N/A 01/31/2023    Procedure: Exploratory Laparotomy, lysis of adhesions, Perforated J-Junostomy Resection, Replace J-Junostomy site;  Surgeon: Elver Bauer MD;  [facility]    LAPAROTOMY, LYSIS ADHESIONS, COMBINED N/A 01/31/2023    Procedure: Dilatation of jejunostomy feeding tube, track with placement of jejunostomy tube with fluoroscopy;  Surgeon: Elver Bauer MD;  [facility]    PICC DOUBLE LUMEN PLACEMENT Left 11/13/2023    Basilic Vein 5F DL 43 cm    REMOVE AND REPLACE BREAST IMPLANT PROSTHESIS N/A 12/30/2022    Procedure: Exploratory Laparotomy, lysis of adhesions, small bowel resection. Placement of gastric jejunostomy for enteral feeding.;  Surgeon: Elver Bauer MD;  [facility]    REMOVE GASTROSTOMY TUBE ADULT N/A 01/28/2022    Procedure: REMOVAL, GASTROSTOMY TUBE, ADULT;  Surgeon: Mandeep Park MD;  [facility]    REPLACE GASTROJEJUNOSTOMY TUBE, PERCUTANEOUS N/A 09/07/2021    Procedure: GASTROJEJUNOSTOMY TUBE PLACEMENT, PERCUTANEOUS, WITH GASTROPEXY;  Surgeon: Mandeep Park MD;  [facility]    REPLACE  GASTROJEJUNOSTOMY TUBE, PERCUTANEOUS N/A 09/22/2021    Procedure: REPLACEMENT, GASTROJEJUNOSTOMY TUBE, PERCUTANEOUS;  Surgeon: Zackery Montoya MD;  [facility]    REPLACE GASTROJEJUNOSTOMY TUBE, PERCUTANEOUS N/A 11/22/2022    Procedure: REPLACEMENT, GASTROJEJUNOSTOMY TUBE, PERCUTANEOUS;  Surgeon: Mandeep Park MD;  [facility]    REPLACE GASTROJEJUNOSTOMY TUBE, PERCUTANEOUS N/A 12/09/2022    Procedure: REPLACEMENT, GASTROJEJUNOSTOMY TUBE, PERCUTANEOUS with ENDOSCOPIC SUTURING.;  Surgeon: Mandeep Park MD;  [facility]    REPLACE GASTROJEJUNOSTOMY TUBE, PERCUTANEOUS N/A 08/01/2023    Procedure: Replace Gastrojejunostomy Tube, Percutaneous;  Surgeon: Mandeep Park MD;  [facility]    REPLACE GASTROJEJUNOSTOMY TUBE, PERCUTANEOUS N/A 11/11/2023    Procedure: Replace Gastrojejunostomy Tube, Percutaneous;  Surgeon: Francisco Dodson MD;  [facility]    REPLACE GASTROJEJUNOSTOMY TUBE, PERCUTANEOUS N/A 12/8/2023    Procedure: Replace Gastrojejunostomy Tube, Percutaneous;  Surgeon: Mandeep Park MD;  [facility]    REPLACE JEJUNOSTOMY TUBE, PERCUTANEOUS N/A 09/10/2021    Procedure: UPPER ENDOSCOPY, REPLACEMENT OF PERCUTANEOUS GASTROJEJUNOSTOMY TUBE, T-TAG GASTROPEXY;  Surgeon: Zackery Montoya MD;  [facility]    REPLACE JEJUNOSTOMY TUBE, PERCUTANEOUS N/A 12/29/2021    Procedure: REPLACEMENT, JEJUNOSTOMY TUBE, PERCUTANEOUS;  Surgeon: Mandeep Park MD;  [facility]    REPLACE JEJUNOSTOMY TUBE, PERCUTANEOUS N/A 05/04/2023    Procedure: REPLACEMENT, JEJUNOSTOMY TUBE, PERCUTANEOUS;  Surgeon: Mandeep Park MD;  [facility]    REPLACE JEJUNOSTOMY TUBE, PERCUTANEOUS  08/01/2023    Procedure: Replace Jejunostomy Tube, Percutaneous;  Surgeon: Mandeep Park MD;  [facility]    REPLACE JEJUNOSTOMY TUBE, PERCUTANEOUS N/A 4/16/2024    Procedure: REPLACEMENT, JEJUNOSTOMY TUBE, PERCUTANEOUS;  Surgeon: Francisco Dodson MD;   [facility]    transphenoidal pituitary resection  1990    [facility]  DELIVERY ONLY  1996    [facility]  DELIVERY ONLY  1998    repeat c section with incidental cystotomy with repair    [facility] EXCIS PITUITARY,TRANSNASAL/SEPTAL  1980    pituitary tumor removed for diabetes insipidus    [facility] TOTAL ABDOM HYSTERECTOMY  1999    w/ bilateral salpingoophorectomy              Social History:      Social History     Tobacco Use    Smoking status: Former     Current packs/day: 0.00     Average packs/day: 0.5 packs/day for 6.3 years (3.2 ttl pk-yrs)     Types: Cigarettes     Start date: 1985     Quit date: 1992     Years since quittin.8    Smokeless tobacco: Not on file    Tobacco comments:     no 2nd hand   Substance Use Topics    Alcohol use: Not Currently     Alcohol/week: 3.0 - 6.0 standard drinks of alcohol     Types: 1 - 2 Glasses of wine, 1 - 2 Cans of beer, 1 - 2 Shots of liquor per week     Comment: none since IGG        History   Sexual Activity    Sexual activity: Yes    Partners: Male    Birth control/ protection: Male Surgical, Female Surgical, None     Comment: Hystectomy              Family History:    Reviewed and non-contributory.        Family History   Problem Relation Age of Onset    Lipids Mother     Hypertension Mother     Thyroid Disease Mother     Depression Mother     Angina Mother     GERD Mother     Skin Cancer Mother     Migraines Mother     Autoimmune Disease Mother     Hyperlipidemia Mother     Mental Illness Mother     Cerebrovascular Disease Mother         2023    Other Cancer Mother         skin-basil cell    Anxiety Disorder Mother     Post Operative Nausea and Vomiting Mother     Eye Disorder Father         cataract, detached retina    Myocardial Infarction Father 60    Lipids Father     Cerebrovascular Disease Father     Depression Father     Substance Abuse Father     Anesthesia Reaction Father         stroke right after surgery     "Cataracts Father     Osteoarthritis Father     Ulcerative Colitis Father     Autoimmune Disease Father     Heart Disease Father     Hyperlipidemia Father     Mental Illness Father     Other Cancer Father         myloma    Anxiety Disorder Father     Interpersonal Violence Sister     Coronary Artery Disease Sister         angioplasty    GERD Sister     Substance Abuse Sister     Cerebrovascular Disease Sister         2005    Depression Sister     Thyroid Disease Sister     Autoimmune Disease Sister     Depression Sister     Mental Illness Sister     Substance Abuse Sister     Thyroid Disease Sister     Eye Disorder Maternal Grandmother         cataract    Thyroid Disease Maternal Grandmother     Diabetes Maternal Grandfather     Eye Disorder Paternal Grandmother         cataract    Diabetes Paternal Grandmother     Eye Disorder Paternal Grandfather         cataract    Diabetes Paternal Grandfather     Substance Abuse Paternal Grandfather     Eye Disorder Son         ptosis    Depression Son     Anxiety Disorder Son     Depression Son     Mental Illness Son     Anxiety Disorder Son     Heart Disease Paternal Aunt     Diabetes Paternal Aunt     Diabetes Paternal Uncle     Heart Disease Paternal Uncle     Depression Nephew     Anxiety Disorder Nephew     Thyroid Disease Nephew     Thyroid Disease Nephew     Anxiety Disorder Nephew     Diabetes Type 2  Cousin         paternal cousin    Autoimmune Disease Cousin              Physical Exam:    BP 99/54 (BP Location: Right arm)   Pulse 115   Temp 98.1  F (36.7  C) (Oral)   Resp 18   Ht 1.727 m (5' 8\")   Wt 62.7 kg (138 lb 3.7 oz)   SpO2 99%   BMI 21.02 kg/m       NAD. In mild pain.  No visible dyspnea or audible wheeze.  Normal speech.  Normal mood and affect.                Laboratory Data:      Lab Results   Component Value Date    A1C 5.4 07/23/2024    A1C 5.6 03/20/2024    A1C 5.4 02/17/2022    A1C 5.2 11/27/2021    A1C 5.5 09/18/2021    A1C 5.4 08/05/2021    A1C " 5.7 05/12/2021    A1C 5.9 (A) 04/01/2021    A1C 5.5 12/17/2020    A1C 5.8 (H) 07/30/2020     Recent Labs   Lab Test 10/12/24  0525   WBC 11.9*   RBC 3.56*   HGB 10.6*   HCT 33.8*   MCV 95   MCH 29.8   MCHC 31.4*   RDW 12.7        Recent Labs   Lab Test 10/12/24  1157 10/12/24  0747 10/12/24  0525 10/11/24  2255 10/11/24  2000   NA  --   --  135  --  137   POTASSIUM  --   --  4.6  --  3.9   CHLORIDE  --   --  99  --  100   CO2  --   --  26  --  26   ANIONGAP  --   --  10  --  11   * 230* 243*   < > 204*   BUN  --   --  12.2  --  13.4   CR  --   --  0.61  --  0.65   KASSI  --   --  8.4*  --  8.5*    < > = values in this interval not displayed.     Recent Labs   Lab Test 10/12/24  0525   PROTTOTAL 5.6*   ALBUMIN 3.5   BILITOTAL 0.9   ALKPHOS 71   AST 31   ALT 19            Assessment and Recommendations:       Assessment:   Pancreatogenic diabetes s/p pancreatectomy and TPAIT in 2016, C peptide- 2.5 in 02/17/2022 reflecting + insulin stores, A1C-6.3% in 09/7/23  S/p exploratory laparotomy, extensive lysis of adhesions, revision of J tube,and small bowel resection on 10/11/24, on continous TPN use now  Chronic malnutrition, on TPN X12 hour, 3 days/week and Tube feeds x 12 hours, 7 days/week at home  Hypothyroidism  Poor oral intake  Continuous TPN related hyperglycemia    BG control in 160-240 mg/dl, over last 12 hours on continuous TPN, and being on medium dose correction scale q4. Didn't get any long acting insulin  yet.      Plan/Recommendations:   ( Orders placed for you)  - Will switch from order levemir 3 units daily to Lantus 24 units q 24 hrs for 24 hour insulin dosing.   - Continue Novolog Correction Scale: medium resistance (ISF: 1:50 )  TID AC / HS / 0200   - BG monitoring: Every 4 hours.  - Hypoglycemia protocol    - Carb counting protocol  Discharge Planning: (tentative)    Medications: TBD    Thank you for this consult. IDS will continue to follow.      Please notify Inpatient Diabetes Service  if changes are planned to steroids, nutrition, TPN/TF and anticipated procedures requiring prolonged NPO status.     Plan discussed with attending.  Lawrence Sims MD  PGY-5  Endocrine fellow       I have seen and examined the patient, reviewed records, reviewed and edited the fellow's note.  I agree with the plan of care.    Sergey Fountain MD   Division of Diabetes, Endocrinology and Metabolism  Department of Medicine

## 2024-10-12 NOTE — PROGRESS NOTES
Colorectal Surgery Progress Note  [facility]  POD#1      Subjective:  Had quite a bit of pain overnight. Otherwise no nausea or vomiting. Ostomy with some stool but no gas. Wasn't able to get out of bed due to pain.     Vitals:  Vitals:    10/12/24 0200 10/12/24 0346 10/12/24 0400 10/12/24 0750   BP: 116/66 95/50 110/52 101/64   BP Location:    Right arm   Pulse: 100 118 101 110   Resp: 10 16 13 17   Temp:  98.1  F (36.7  C)  99.4  F (37.4  C)   TempSrc:  Oral  Oral   SpO2: 95% 95% 95% 98%   Weight:       Height:         I/O:  I/O last 3 completed shifts:  In: 2804.47 [I.V.:2386.14; NG/GT:90; IV Piggyback:105]  Out: 2035 [Urine:1810; Stool:75; Blood:150]    Physical Exam:  Gen: AAOx3, NAD  Pulm: Non-labored breathing  Abd: Soft, non-distended, appropriately tender, no guarding   Midline incision clean, dry, and intact    Stoma pink and viable with stool but no gas in bag   KELI drain with serosanguineous output  Ext:  Warm and well-perfused    BMP  Recent Labs   Lab 10/12/24  0747 10/12/24  0525 10/12/24  0351 10/11/24  2255 10/11/24  2000   NA  --  135  --   --  137   POTASSIUM  --  4.6  --   --  3.9   CHLORIDE  --  99  --   --  100   CO2  --  26  --   --  26   BUN  --  12.2  --   --  13.4   CR  --  0.61  --   --  0.65   * 243* 238* 209* 204*   MAG  --  1.8  --   --  1.7   PHOS  --  2.8  --   --  4.2     CBC  Recent Labs   Lab 10/12/24  0525   WBC 11.9*   HGB 10.6*   HCT 33.8*            ASSESSMENT: This is a 59 year old female s/p TAC and ileorectal anastomosis, diverting loop ileostomy on 10/11 for slow transit constipation. PMH chronic pancreatitis s/p TPAT 2016, gastroparesis with G and J tubes. On TPN and J tube feeds with venting G tube at home. Pt receives all meds via J tube due to dysphagia.     Plan today  - PCA for improved pain control  - discontinue [place]  - DM II with TPAT: home basal/bolus regimen. Consulted endo, appreciate recs  - NPO, home TPN through  weekend. Hold home TF  - home depression, nausea, and hypothyroid medications  - hold DVT ppx, resume when hgb stable    Neuro/Pain: PCA, Tylenol, oxy  CV: monitor HR, BP  PULM: encourage IS.  GI/FEN: NPO, TPN. Hold home TF  : monitor UOP  Heme: acute blood loss anemia: monitor hgb, transfuse for hgb < 7  ID: no abx  Endocrine: DM II with TPAT: home basal/bolus regimen. Consulted endo, appreciate recs  - NPO, home TPN through weekend. Hold home TF  Lines: G tube, J tube, port  Activity: as tolerated.  Ppx: DVT ppx held  Dispo: pending clinical course    Clinically Significant Risk Factors           # Hypocalcemia: Lowest Ca = 8.4 mg/dL in last 2 days, will monitor and replace as appropriate      # Coagulation Defect: INR = 1.24 (Ref range: 0.85 - 1.15) and/or PTT = N/A, will monitor for bleeding                           Patient was discussed with Dr. Javi Rae MD  Colon and Rectal Surgery Fellow    Please search [facility] to view the current call schedule.

## 2024-10-12 NOTE — PROGRESS NOTES
CLINICAL NUTRITION SERVICES - ASSESSMENT NOTE     Nutrition Prescription    RECOMMENDATIONS FOR MDs/PROVIDERS TO ORDER:  Glycemic control per team     Malnutrition Status:    Unable to determine due to unable to perform all aspects of NFPE    Recommendations already ordered by Registered Dietitian (RD):   Entered pharmacy change order for the following TPN regimen:     Dosing wt: 63 kg admit wt on 10/11/24  Access: PICC line / Central line      Goal PN: Volume: 60 ml/hr, 1440 ml/day with Dextrose: 200 grams/day, AA:100 grams/day + SMOF 250 ml IV lipids x 7 days per week.      --Refeeding precaution: If Mg++/K+ /Phos normal, begin PN with dextrose of 140 gm /day (GIR:1.5). Once pt receives ~100% of initial continuous PN volume with K+/Mg++/Phos  WNL, advance PN dextrose by 30 gm daily to goal dextrose of 200 gm/day.      --Regimen provides: 1580 kcal /day (25 kcal/kg ), 100 gm protein /day (1.6 gm/kg post op needs), 200 gm carbs (GIR: 2.2 mg/kg/min) and 31% daily SMOF lipids.     - Vitamin /minerals:10 ml Infuvite, 1 ml MTE-4  - Patient with DM1 and on insulin. Glycemic control per endocrine       Future/Additional Recommendations:  TPN labs       REASON FOR ASSESSMENT  Dinora Mcghee is a/an 59 year old female assessed by the dietitian for Pharmacy/Nutrition to Start and Manage PN -> home TPN    Chart reviewed:  10/12 surgery: Exploratory laparotomy, extensive lysis of adhesions, revision of Jtube and small bowel resection, TOTAL ABDOMINAL COLECTOMY WITH ILEORECTAL ANASTAMOSIS, DIVERTING LOOP ILEOSTOMY, flexible sigmoidoscopy for Pre-Op Diagnosis of Slow transit constipation     Per H&P:   PMH DM1, chronic pancreatitis, GERD, Gastroparesis, History of pituitary adenoma, Hypothyroidism, Medical refractory constipation due to colonic dysmotility      NUTRITION HISTORY  Has G/J tube -> per review of RD note from 7/25/24, patient was on TF with [employer] peptide 1.5, 120 ml/hr x 12hrs, goal 1440 ml per day + 1 packet  "of prosource TF20 via J-tube + Relizorb    - At that time, patient was also on supplemental TPN, 1000 ml, 100 g dextrose, 60 g AA with 0 ml lipids   - Provision: 580 Kcals (10 Kcals/kg), 1 g/kg protein, GIR 1.22 mg/kg/minute.    - MVI + trace elements    - Patient not available to see.      CURRENT NUTRITION ORDERS  Diet: NPO, IVF  - G tube to gravity, J tube to gravity     - TPN: started last might post op with Clinimix 5/15 @ 40 ml/hr + No lipids ordered last night , low rate for refeeding precaution.         LABS  K+: 4.6, Mg++: 1.8, Phos: 2.8  BUN: 12.2, Cr: 0.61, GFR:<90  Na+: 135  B (H)  ALP: 71, ALT:10, AST:31  Total bili:0.9  TGs: N/A       MEDICATIONS  Medications reviewed    ANTHROPOMETRICS  Height: 172.7 cm (5' 8\")  Most Recent Weight: 62.7 kg (138 lb 3.7 oz)    IBW: 63.6 kg ( 99% IbW)  BMI: Normal BMI   Weight History:   Wt Readings from Last 10 Encounters:   10/11/24 62.7 kg (138 lb 3.7 oz)   10/08/24 66.5 kg (146 lb 9.6 oz)   24 63 kg (139 lb)   24 63.5 kg (139 lb 15.9 oz)   24 61.2 kg (135 lb)   24 61.2 kg (135 lb)   24 58 kg (127 lb 13.9 oz)   24 56.7 kg (125 lb)   24 57.2 kg (126 lb)   24 56.2 kg (124 lb)       Dosing Weight: 63 kg admit wt / baseline wt     ASSESSED NUTRITION NEEDS  Estimated Energy Needs: 1575- 1890 kcals/day (25 - 30 kcals/kg)  Justification: Maintenance  Estimated Protein Needs: 76- 95 grams protein/day (1.2 - 1.5 grams of pro/kg)  Justification: Post-op  Estimated Fluid Needs:  (1 mL/kcal)   Justification: Maintenance    PHYSICAL FINDINGS  See malnutrition section below.    MALNUTRITION  % Intake: Decreased intake does not meet criteria, was on TPN prior to admit  % Weight Loss: None noted  Subcutaneous Fat Loss: Unable to assess  Muscle Loss: Unable to assess  Fluid Accumulation/Edema: None noted  Malnutrition Diagnosis: Unable to determine due to unable to perform all aspects of NFPE      NUTRITION DIAGNOSIS  Inadequate " oral intake related to altered GI + post op status as evidenced by TPN needs      INTERVENTIONS  Implementation  Nutrition Education: deferred today    Parenteral Nutrition/IV Fluids - Sent pharmacy change order      Goals  Total avg nutritional intake to meet a minimum of 25 kcal/kg and 1.2 g PRO/kg daily (per dosing wt 63 kg admit wt).     Monitoring/Evaluation  Progress toward goals will be monitored and evaluated per protocol.    Henry Jacobson RD/LD  Unit [facility] ([id number]) and ([id number]),  Monday-Friday: Vocera -> (7C clinical Dietitian),   Weekend/Holiday: Vocera -> (Weekend Clinical Dietitian)

## 2024-10-12 NOTE — PLAN OF CARE
Neuro: A&Ox4.   Cardiac: SR. VSS.  Respiratory: Sating >95% on RA.  GI/: Adequate urine output w rose. Colostomy with dark liquid as output. G tube and J tube. Drainage bags for both. Meds through J tube, remove drainage bag and clamp for one hour after meds given.  Diet/appetite: NPO, ice chips ok. Getting TPN continuous at 40mL/hr  Activity:  Assist of 1  Pain: Extremely uncomfortable, have given PRN dilaudid and oxy around the clock with no pain relief. Patient could benefit from PCA pump.  Skin: Surgical sites in tact  LDA's: G tube, J tube, rose, Port ( NO BLOOD RETURN in BOTH lumens, flushes and infuses fine), colostomy, PIV running LR at 75mL/hr    Plan: PAIN CONTROL Continue with POC. Notify primary team with changes.

## 2024-10-12 NOTE — ANESTHESIA CARE TRANSFER NOTE
Patient: Dinora Mcghee    Procedure: Procedure(s):  Exploratory laparotomy, extensive lysis of adhesions, revision of Jtube and small bowel resection, TOTAL ABDOMINAL COLECTOMY WITH ILEORECTAL ANASTAMOSIS, DIVERTING LOOP ILEOSTOMY, flexible sigmoidoscopy       Diagnosis: Slow transit constipation [K59.01]  Diagnosis Additional Information: No value filed.    Anesthesia Type:   General     Note:    Oropharynx: oropharynx clear of all foreign objects and spontaneously breathing  Level of Consciousness: awake  Oxygen Supplementation: face mask  Level of Supplemental Oxygen (L/min / FiO2): 6  Independent Airway: airway patency satisfactory and stable  Dentition: dentition unchanged  Vital Signs Stable: post-procedure vital signs reviewed and stable  Report to RN Given: handoff report given  Patient transferred to: PACU    Handoff Report: Identifed the Patient, Identified the Reponsible Provider, Reviewed the pertinent medical history, Discussed the surgical course, Reviewed Intra-OP anesthesia mangement and issues during anesthesia, Set expectations for post-procedure period and Allowed opportunity for questions and acknowledgement of understanding      Vitals:  Vitals Value Taken Time   /67 10/11/24 1900   Temp     Pulse 92 10/11/24 1902   Resp 17 10/11/24 1902   SpO2 100 % 10/11/24 1900   Vitals shown include unfiled device data.    Electronically Signed By: NATALY Clark CRNA  October 11, 2024  7:02 PM

## 2024-10-13 LAB
ALBUMIN SERPL BCG-MCNC: 3.2 G/DL (ref 3.5–5.2)
ALP SERPL-CCNC: 79 U/L (ref 40–150)
ALT SERPL W P-5'-P-CCNC: 14 U/L (ref 0–50)
ANION GAP SERPL CALCULATED.3IONS-SCNC: 5 MMOL/L (ref 7–15)
AST SERPL W P-5'-P-CCNC: 31 U/L (ref 0–45)
BASOPHILS # BLD MANUAL: 0 10E3/UL (ref 0–0.2)
BASOPHILS NFR BLD MANUAL: 0 %
BILIRUB DIRECT SERPL-MCNC: <0.2 MG/DL (ref 0–0.3)
BILIRUB SERPL-MCNC: 0.4 MG/DL
BUN SERPL-MCNC: 11.3 MG/DL (ref 6–20)
BURR CELLS BLD QL SMEAR: SLIGHT
CALCIUM SERPL-MCNC: 8.7 MG/DL (ref 8.8–10.4)
CHLORIDE SERPL-SCNC: 100 MMOL/L (ref 98–107)
CREAT SERPL-MCNC: 0.5 MG/DL (ref 0.51–0.95)
EGFRCR SERPLBLD CKD-EPI 2021: >90 ML/MIN/1.73M2
EOSINOPHIL # BLD MANUAL: 0.5 10E3/UL (ref 0–0.7)
EOSINOPHIL NFR BLD MANUAL: 4 %
ERYTHROCYTE [DISTWIDTH] IN BLOOD BY AUTOMATED COUNT: 12.8 % (ref 10–15)
GLUCOSE BLDC GLUCOMTR-MCNC: 134 MG/DL (ref 70–99)
GLUCOSE BLDC GLUCOMTR-MCNC: 137 MG/DL (ref 70–99)
GLUCOSE BLDC GLUCOMTR-MCNC: 158 MG/DL (ref 70–99)
GLUCOSE BLDC GLUCOMTR-MCNC: 167 MG/DL (ref 70–99)
GLUCOSE BLDC GLUCOMTR-MCNC: 169 MG/DL (ref 70–99)
GLUCOSE BLDC GLUCOMTR-MCNC: 181 MG/DL (ref 70–99)
GLUCOSE SERPL-MCNC: 186 MG/DL (ref 70–99)
HCO3 SERPL-SCNC: 28 MMOL/L (ref 22–29)
HCT VFR BLD AUTO: 29.5 % (ref 35–47)
HGB BLD-MCNC: 9.3 G/DL (ref 11.7–15.7)
INR PPP: 1.2 (ref 0.85–1.15)
LYMPHOCYTES # BLD MANUAL: 0.5 10E3/UL (ref 0.8–5.3)
LYMPHOCYTES NFR BLD MANUAL: 4 %
MAGNESIUM SERPL-MCNC: 1.4 MG/DL (ref 1.7–2.3)
MAGNESIUM SERPL-MCNC: 2.2 MG/DL (ref 1.7–2.3)
MCH RBC QN AUTO: 30.4 PG (ref 26.5–33)
MCHC RBC AUTO-ENTMCNC: 31.5 G/DL (ref 31.5–36.5)
MCV RBC AUTO: 96 FL (ref 78–100)
MONOCYTES # BLD MANUAL: 1.2 10E3/UL (ref 0–1.3)
MONOCYTES NFR BLD MANUAL: 8 %
NEUTROPHILS # BLD MANUAL: 12.6 10E3/UL (ref 1.6–8.3)
NEUTROPHILS NFR BLD MANUAL: 85 %
PHOSPHATE SERPL-MCNC: 1.8 MG/DL (ref 2.5–4.5)
PHOSPHATE SERPL-MCNC: 3 MG/DL (ref 2.5–4.5)
PLAT MORPH BLD: ABNORMAL
PLATELET # BLD AUTO: 271 10E3/UL (ref 150–450)
POLYCHROMASIA BLD QL SMEAR: SLIGHT
POTASSIUM SERPL-SCNC: 4.1 MMOL/L (ref 3.4–5.3)
PREALB SERPL-MCNC: 8.1 MG/DL (ref 20–40)
PROT SERPL-MCNC: 5.8 G/DL (ref 6.4–8.3)
RBC # BLD AUTO: 3.06 10E6/UL (ref 3.8–5.2)
RBC MORPH BLD: ABNORMAL
SODIUM SERPL-SCNC: 133 MMOL/L (ref 135–145)
WBC # BLD AUTO: 14.8 10E3/UL (ref 4–11)

## 2024-10-13 PROCEDURE — 120N000002 HC R&B MED SURG/OB UMMC

## 2024-10-13 PROCEDURE — 82248 BILIRUBIN DIRECT: CPT | Performed by: COLON & RECTAL SURGERY

## 2024-10-13 PROCEDURE — 85027 COMPLETE CBC AUTOMATED: CPT | Performed by: COLON & RECTAL SURGERY

## 2024-10-13 PROCEDURE — 250N000013 HC RX MED GY IP 250 OP 250 PS 637: Performed by: COLON & RECTAL SURGERY

## 2024-10-13 PROCEDURE — 258N000003 HC RX IP 258 OP 636: Performed by: COLON & RECTAL SURGERY

## 2024-10-13 PROCEDURE — 250N000009 HC RX 250: Performed by: SURGERY

## 2024-10-13 PROCEDURE — 84100 ASSAY OF PHOSPHORUS: CPT | Performed by: COLON & RECTAL SURGERY

## 2024-10-13 PROCEDURE — 250N000009 HC RX 250: Performed by: COLON & RECTAL SURGERY

## 2024-10-13 PROCEDURE — 250N000011 HC RX IP 250 OP 636: Performed by: COLON & RECTAL SURGERY

## 2024-10-13 PROCEDURE — 85610 PROTHROMBIN TIME: CPT | Performed by: COLON & RECTAL SURGERY

## 2024-10-13 PROCEDURE — 84134 ASSAY OF PREALBUMIN: CPT | Performed by: COLON & RECTAL SURGERY

## 2024-10-13 PROCEDURE — B4185 PARENTERAL SOL 10 GM LIPIDS: HCPCS | Performed by: COLON & RECTAL SURGERY

## 2024-10-13 PROCEDURE — 250N000011 HC RX IP 250 OP 636: Performed by: STUDENT IN AN ORGANIZED HEALTH CARE EDUCATION/TRAINING PROGRAM

## 2024-10-13 PROCEDURE — 36415 COLL VENOUS BLD VENIPUNCTURE: CPT | Performed by: COLON & RECTAL SURGERY

## 2024-10-13 PROCEDURE — 250N000009 HC RX 250

## 2024-10-13 PROCEDURE — 250N000011 HC RX IP 250 OP 636: Performed by: SURGERY

## 2024-10-13 PROCEDURE — 83735 ASSAY OF MAGNESIUM: CPT | Performed by: COLON & RECTAL SURGERY

## 2024-10-13 PROCEDURE — 250N000013 HC RX MED GY IP 250 OP 250 PS 637: Performed by: SURGERY

## 2024-10-13 PROCEDURE — 258N000003 HC RX IP 258 OP 636: Performed by: SURGERY

## 2024-10-13 PROCEDURE — 80053 COMPREHEN METABOLIC PANEL: CPT | Performed by: COLON & RECTAL SURGERY

## 2024-10-13 PROCEDURE — 85007 BL SMEAR W/DIFF WBC COUNT: CPT | Performed by: COLON & RECTAL SURGERY

## 2024-10-13 PROCEDURE — 999N000248 HC STATISTIC IV INSERT WITH US BY RN

## 2024-10-13 RX ORDER — MAGNESIUM SULFATE HEPTAHYDRATE 40 MG/ML
4 INJECTION, SOLUTION INTRAVENOUS ONCE
Status: COMPLETED | OUTPATIENT
Start: 2024-10-13 | End: 2024-10-13

## 2024-10-13 RX ORDER — GABAPENTIN 250 MG/5ML
100 SOLUTION ORAL 3 TIMES DAILY PRN
Status: DISCONTINUED | OUTPATIENT
Start: 2024-10-13 | End: 2024-10-28 | Stop reason: HOSPADM

## 2024-10-13 RX ORDER — POTASSIUM PHOS IN 0.9 % NACL 15MMOL/250
15 PLASTIC BAG, INJECTION (ML) INTRAVENOUS ONCE
Status: COMPLETED | OUTPATIENT
Start: 2024-10-13 | End: 2024-10-13

## 2024-10-13 RX ORDER — CAPSAICIN 0.75 MG/G
CREAM TOPICAL 3 TIMES DAILY PRN
Status: DISCONTINUED | OUTPATIENT
Start: 2024-10-13 | End: 2024-10-28 | Stop reason: HOSPADM

## 2024-10-13 RX ORDER — AMITRIPTYLINE HCL 25 MG
50 TABLET ORAL AT BEDTIME
Status: DISCONTINUED | OUTPATIENT
Start: 2024-10-13 | End: 2024-10-28 | Stop reason: HOSPADM

## 2024-10-13 RX ORDER — SUMATRIPTAN 6 MG/.5ML
6 INJECTION, SOLUTION SUBCUTANEOUS EVERY 12 HOURS PRN
Status: DISCONTINUED | OUTPATIENT
Start: 2024-10-13 | End: 2024-10-28 | Stop reason: HOSPADM

## 2024-10-13 RX ADMIN — PROMETHAZINE HYDROCHLORIDE 25 MG: 25 INJECTION, SOLUTION INTRAMUSCULAR; INTRAVENOUS at 14:50

## 2024-10-13 RX ADMIN — ACETAMINOPHEN 650 MG: 325 SOLUTION ORAL at 20:45

## 2024-10-13 RX ADMIN — PROMETHAZINE HYDROCHLORIDE 25 MG: 25 INJECTION, SOLUTION INTRAMUSCULAR; INTRAVENOUS at 21:49

## 2024-10-13 RX ADMIN — BACLOFEN 20 MG: 10 TABLET ORAL at 08:10

## 2024-10-13 RX ADMIN — HEPARIN SODIUM 5000 UNITS: 5000 INJECTION, SOLUTION INTRAVENOUS; SUBCUTANEOUS at 16:07

## 2024-10-13 RX ADMIN — INSULIN ASPART 1 UNITS: 100 INJECTION, SOLUTION INTRAVENOUS; SUBCUTANEOUS at 08:09

## 2024-10-13 RX ADMIN — LEVOTHYROXINE SODIUM 150 MCG: 75 TABLET ORAL at 08:14

## 2024-10-13 RX ADMIN — POTASSIUM PHOSPHATE, MONOBASIC POTASSIUM PHOSPHATE, DIBASIC 15 MMOL: 224; 236 INJECTION, SOLUTION, CONCENTRATE INTRAVENOUS at 10:42

## 2024-10-13 RX ADMIN — Medication 60 MG: at 20:43

## 2024-10-13 RX ADMIN — PROMETHAZINE HYDROCHLORIDE 25 MG: 25 INJECTION, SOLUTION INTRAMUSCULAR; INTRAVENOUS at 08:09

## 2024-10-13 RX ADMIN — ACETAMINOPHEN 650 MG: 325 SOLUTION ORAL at 12:29

## 2024-10-13 RX ADMIN — INSULIN GLARGINE 3 UNITS: 100 INJECTION, SOLUTION SUBCUTANEOUS at 16:07

## 2024-10-13 RX ADMIN — BACLOFEN 20 MG: 10 TABLET ORAL at 14:11

## 2024-10-13 RX ADMIN — ACETAMINOPHEN 650 MG: 325 SOLUTION ORAL at 16:07

## 2024-10-13 RX ADMIN — HEPARIN SODIUM 5000 UNITS: 5000 INJECTION, SOLUTION INTRAVENOUS; SUBCUTANEOUS at 08:09

## 2024-10-13 RX ADMIN — ACETAMINOPHEN 650 MG: 325 SOLUTION ORAL at 08:10

## 2024-10-13 RX ADMIN — INSULIN ASPART 1 UNITS: 100 INJECTION, SOLUTION INTRAVENOUS; SUBCUTANEOUS at 00:09

## 2024-10-13 RX ADMIN — INSULIN ASPART 1 UNITS: 100 INJECTION, SOLUTION INTRAVENOUS; SUBCUTANEOUS at 04:13

## 2024-10-13 RX ADMIN — Medication 50 MG: at 21:51

## 2024-10-13 RX ADMIN — SODIUM CHLORIDE, POTASSIUM CHLORIDE, SODIUM LACTATE AND CALCIUM CHLORIDE: 600; 310; 30; 20 INJECTION, SOLUTION INTRAVENOUS at 06:27

## 2024-10-13 RX ADMIN — BACLOFEN 20 MG: 10 TABLET ORAL at 20:45

## 2024-10-13 RX ADMIN — PANTOPRAZOLE SODIUM 40 MG: 40 INJECTION, POWDER, FOR SOLUTION INTRAVENOUS at 08:10

## 2024-10-13 RX ADMIN — MAGNESIUM SULFATE HEPTAHYDRATE: 500 INJECTION, SOLUTION INTRAMUSCULAR; INTRAVENOUS at 21:03

## 2024-10-13 RX ADMIN — SMOFLIPID 250 ML: 6; 6; 5; 3 INJECTION, EMULSION INTRAVENOUS at 21:02

## 2024-10-13 RX ADMIN — INSULIN ASPART 1 UNITS: 100 INJECTION, SOLUTION INTRAVENOUS; SUBCUTANEOUS at 12:28

## 2024-10-13 RX ADMIN — MAGNESIUM SULFATE IN WATER 4 G: 40 INJECTION, SOLUTION INTRAVENOUS at 08:12

## 2024-10-13 RX ADMIN — HEPARIN SODIUM 5000 UNITS: 5000 INJECTION, SOLUTION INTRAVENOUS; SUBCUTANEOUS at 00:11

## 2024-10-13 ASSESSMENT — ACTIVITIES OF DAILY LIVING (ADL)
ADLS_ACUITY_SCORE: 33

## 2024-10-13 NOTE — PROGRESS NOTES
"BRIEF SURGERY CROSS COVER NOTE    HPI:  Paged due to ostomy leakage into incision postoperatively.  At bedside, ostomy unable to adhere on medial side and leaking bilious fluid onto dressing.  Also messaged regarding PCA dosing after conversation with Dr. Abdalla and going up on this.      OBJECTIVE:  BP 99/54 (BP Location: Right arm)   Pulse 99   Temp 98.3  F (36.8  C) (Oral)   Resp 18   Ht 1.727 m (5' 8\")   Wt 62.7 kg (138 lb 3.7 oz)   SpO2 98%   BMI 21.02 kg/m    Gen: NAD  Neuro: alert and orientated, no FND.  In pain  CV: nontachycardic  Pulmn: NLB on RA  Abd: soft, nondistended.  Quite tender to palpation  Incision: Island dressing with green bilious staining without saturation.  Photo uploaded.  Does not appear that bilious fluid flowed into incision.  Dressing taken down, and another photo uploaded.  Incision intact and clean.  Irrigated with sterile saline.  Redressed with island dressing.  Ostomy: No way to repair ostomy and prevent leakage with current dressing.  Repair dressing and replaced ostomy.      A & P:  This is a 59 year old female s/p TAC and ileorectal anastomosis, diverting loop ileostomy on 10/11 for slow transit constipation.  Having a lot of pain, PCA increased 0.1-0.2 per Dr. Abdalla verbal order.  Incision is fresh postoperatively but without obvious concern.  Would benefit from keeping clean.  Replaced dressing with attempt to divert around ostomy site as much as possible.  Trial repeat with new dressing and new ostomy and reach out to team in a.m. with issues.  Otherwise, patient appearing stable and without concerns.      -Will place dressing and ostomy, photos uploaded  -Follow-up in a.m. with primary team if a more permanent solution is needed  -PCA orders updated      This case discussed with CRC fellow      Sweta Ovalle MD  Surgery Resident PGY1    "

## 2024-10-13 NOTE — PLAN OF CARE
"/68 (BP Location: Right arm)   Pulse 99   Temp 99.5  F (37.5  C) (Oral)   Resp 18   Ht 1.727 m (5' 8\")   Wt 62.7 kg (138 lb 3.7 oz)   SpO2 96%   BMI 21.02 kg/m     Time: 2700-3471  Reason for admission: Constipation.   Activity: Assist of one person.   Pain: Patient uses Dilaudid PCA for pain management, and it was effective.   Neuro: alert and oriented.   Cardiac: WDL X tachycardic.   Resp: denied SOB, lung sounds clear bilaterally.   GI/: NPO except meds and ice chips, unable to void this morning, bladder scanned and it was 583, straight catheter, patient had 600 ml urine output,  bowel sounds present x four quadrants.   Lines: CVC, TPN/lipids infusing, PIV, LR 50 ml/hr and dilaudid PCA infusing.   Skin: ABD incision, G/J tube to gravity, and ileostomy.   Labs/Imaging: BG at 2000, 129, BG at 0000, 158, received insulin per sliding scale, BG at 0400, 169, received insulin per sliding scale.   New changes to shift: no change.   Plan: continue with current plan of cares.   "

## 2024-10-13 NOTE — PROVIDER NOTIFICATION
Patient has CVC TPN/lipids infusing, it flushes ok,  but no blood return this morning, unable to use her CVC for lab draw, colon rectal resident, Jay Heredia notified.

## 2024-10-13 NOTE — PROGRESS NOTES
Colorectal Surgery Progress Note  [facility]  POD#2      Subjective:  Reports nausea without vomiting, unable to void overnight. Liquid and minimal gas from ostomy. Minimal ambulation yesterday.    Vitals:  Vitals:    10/12/24 1525 10/12/24 1914 10/13/24 0013 10/13/24 0415   BP:  117/68 119/67 120/72   BP Location:  Right arm Left arm Left arm   Pulse: 99 99 114 107   Resp: 18 18 18 18   Temp: 98.3  F (36.8  C) 99.5  F (37.5  C) 98.6  F (37  C) 99.1  F (37.3  C)   TempSrc: Oral Oral Oral Oral   SpO2: 98% 96% 96% 94%   Weight:       Height:         I/O:  I/O last 3 completed shifts:  In: 1769.5 [I.V.:1306.17; NG/GT:240]  Out: 2150 [Urine:2050; Stool:100]    Physical Exam:  Gen: AAOx3, NAD  Pulm: Non-labored breathing  Abd: Soft, non-distended, appropriately tender, no guarding   Midline incision clean, dry, and intact    Stoma pink and viable with stool and minimal gas in bag   KELI drain with serosanguineous output  Ext:  Warm and well-perfused    BMP  Recent Labs   Lab 10/13/24  0412 10/13/24  0008 10/12/24  2045 10/12/24  1532 10/12/24  0747 10/12/24  0525 10/11/24  2255 10/11/24  2000   NA  --   --   --   --   --  135  --  137   POTASSIUM  --   --   --   --   --  4.6  --  3.9   CHLORIDE  --   --   --   --   --  99  --  100   CO2  --   --   --   --   --  26  --  26   BUN  --   --   --   --   --  12.2  --  13.4   CR  --   --   --   --   --  0.61  --  0.65   * 158* 129* 149*   < > 243*   < > 204*   MAG  --   --   --   --   --  1.8  --  1.7   PHOS  --   --   --   --   --  2.8  --  4.2    < > = values in this interval not displayed.     CBC  Recent Labs   Lab 10/12/24  0525   WBC 11.9*   HGB 10.6*   HCT 33.8*            ASSESSMENT: This is a 59 year old female s/p TAC and ileorectal anastomosis, diverting loop ileostomy on 10/11 for slow transit constipation. PMH chronic pancreatitis s/p TPAT 2016, gastroparesis with G and J tubes. On TPN and J tube feeds with venting G tube  at home. Pt receives all meds via J tube due to dysphagia.     Plan today  - continue PCA  - DM II with TPAT: home basal/bolus regimen. Consulted oh, appreciate recs  - clear liquid diet, home TPN through weekend. Hold home TF  - home depression, nausea, and hypothyroid medications  - hold DVT ppx, resume when hgb stable  - discontinue IV fluids, continue TPN and lipid infusion  - replace rose if unable to void    Neuro/Pain: PCA, Tylenol, oxy  CV: monitor HR, BP  PULM: encourage IS.  GI/FEN: CLD, TPN. Hold home TF  : replace rose if unable to void  Heme: acute blood loss anemia: monitor hgb, transfuse for hgb < 7  ID: no abx  Endocrine: DM II with TPAT: home basal/bolus regimen. Consulted oh, appreciate recs  - NPO, home TPN through weekend. Hold home TF  Lines: G tube, J tube, port  Activity: as tolerated, encourage ambulation  Ppx: DVT ppx held  Dispo: pending clinical course      Patient was discussed with Dr. Javi Zaldivar MD, PGY-1  General Surgery Resident

## 2024-10-13 NOTE — PROGRESS NOTES
Inpatient Diabetes Management Service : Progress note    Patient: Dinora Mcghee   YOB: 1965    MRN: [id number]     Date of Consult : 10/13/2024   Date of Admission: 10/11/2024     Reason for Consult: Management of pancreatogenic DM, on TPN and expected to be on TF   Consult Requestor: Sonja Rae           History of Present Illness:     Dinora Mcghee is a 59 year old with  pancreatogenic diabetes  s/p pancreatectomy and TPAIT in 2016,complicated by gastroparesis,  as well as a comorbid history of HTN,HLD, Hypothyroidism, chronic nausea/and constipation, chronic malnutrition and TPN and Tube feeds dependent , who was admitted on 10/11/2024 for exploratory laparotomy, extensive lysis of adhesions, revision of J tube,and small bowel resection. Inpatient Diabetes Service consulted for management of diabetes in the setting of continuous TPN use and expected TF use in future..     Interval history: Blood glucose at goal. Pt continue to be on TPN. G and J tube on gravity.    H/o Diabetes:-   Reported she was diagnosed with diabetes after pancreatectomy and TPAIT in 2016, she required insulin for 3 years after surgery and was then off of insulin and then re-started again couple of years ago.   She uses TPN x 12 hours, 3 days/week and Tube feeds x 13 hours, 7 days/week.    Takes insulin levemir 3 units daily and novolog 1 unit if BG is 175-275, and 2 units if >275 up to every 4 hours as needed. Reports some times she keeps on  giving correction whole night but other times does it once or not require it all. She does both TPN and Tube feeds at night. Her oral intake is minimal. She have 2 entral tube, G and J, one for medications and decompression and other for tube feeds.    Reports she use to have severe hypoglycemic episodes at night,  which she would treat by micro bolusing her glucagon injection but since starting TPN about 5 months ago, she didn't have further hypoglycemic episodes.     Patient  is  known to the Inpatient Diabetes Service from past admission(s).        Other Active/Contributory Medical Problems: Hypothyroidism  Chronic malnutrition- TPN and PEG tube dependent  H/o  f HTN,HLD, Hypothyroidism, chronic nausea/and constipation,   Planned Procedures/Surgeries: None    Relevant Labs on Admission:  if contributory, otherwise see labs below  Renal function: Creatinine (0.61), eGFR (>90)    BMP: Na (135), K (4.6) Bicarb (26), Anion Gap (10), Glucose (243)  Hgb: 10.6   A1c: 5.4 % on 07/23/24  GAD65 antibody - <5 ( 11/29/2016) ,islet antibody- <1:4 (11/29/2016), insulin antibody- <0.4 and c-peptide 2.5 on 02/17/22.      Inpatient Glucose Trends:  \         Diabetes History:   Diabetes Type and Duration: Since 2016, s/p pancreatectomy and TPAI    PTA Medication Regimen: Takes insulin levemir 3 units daily and novolog 1 unit if BG is 175-275, and 2 units if >275 up to every 4 hours as needed. Reports some times she keeps on  giving correction whole night but other times does it once or not require it all.  Historical Diabetes Medications: As above    Glucose monitoring device and frequency: Checks with Becual 3  Outpatient Diabetes Provider: None  Formal Diabetes Education/Educator: None    ------------------------  Complications:  Unknown  peripheral neuropathy, + gastroparesis,     History of DKA: None  Hypoglycemia PTA:   - Frequency: Frequent hypoglycemia, 5 months prior, stopped since starting TPN about 5 months ago  - Severity: Mild to moderate  - Awareness:  intact    Safety Kit:   - Glucagon: Injection    Diet/Lifestyle: Minimal oral intake    Ability to [place] Prescribed Regimen:  Yes   Food/Housing Insecurity: No concerns          Medications Impacting Glycemia:    Steroids: None  D5W containing solutions/medications: None  Other medications impacting glucose: NOne         Diet/Nutrition:    Orders Placed This Encounter      Clear Liquid Diet     Supplements:  NPO  TF: No  TPN: Continuous  TPN on.          Review of Systems:      Negative except abdominal pain.            Past Medical History:       Past Medical History:   Diagnosis Date    Allergic rhinitis, cause unspecified     Allergy to other foods     strawberries, apples, celeries, alice, watermelon    Arthritis     left knee    Choledocholithiasis     long after cholecystectomy    Chronic abdominal pain     Chronic constipation     Chronic nausea     Chronic pancreatitis (H)     Degeneration of lumbar or lumbosacral intervertebral disc     Esophageal reflux     w/ hiatal hernia    Gastroparesis     Hiatal hernia     History of pituitary adenoma     s/p resection    Hypothyroidism     Migraines     Mild hyperlipidemia     On tube feeding diet     presence of GJ tube    Pancreatic disease     PD stricture, 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 endometriosis growth    APPENDECTOMY       SECTION  1993    COLONOSCOPY  2014    COLONOSCOPY N/A 2023    Procedure: COLONOSCOPY, WITH POLYPECTOMY AND BIOPSY;  Surgeon: Percy Chamorro MD;  [facility]    ENDOSCOPIC INSERTION TUBE GASTROSTOMY N/A 2021    Procedure: Gastrojejonostomy placement with jejunopexy, PEG tube exchange;  Surgeon: Zackery Montoya MD;  [facility]    ENDOSCOPIC RETROGRADE CHOLANGIOPANCREATOGRAM N/A 2015    Procedure: ENDOSCOPIC RETROGRADE CHOLANGIOPANCREATOGRAM;  Surgeon: Mandeep Park MD;  [facility]    ENDOSCOPIC RETROGRADE CHOLANGIOPANCREATOGRAM N/A 2016    Procedure: COMBINED ENDOSCOPIC RETROGRADE CHOLANGIOPANCREATOGRAPHY, PLACE TUBE/STENT;  Surgeon: Mandeep Park  MD Eduardo;  [facility]    ENDOSCOPIC RETROGRADE CHOLANGIOPANCREATOGRAM N/A 03/17/2016    Procedure: COMBINED ENDOSCOPIC RETROGRADE CHOLANGIOPANCREATOGRAPHY, REMOVE FOREIGN BODY OR STENT/TUBE;  Surgeon: Mandeep Park MD;  [facility]    ENDOSCOPIC RETROGRADE CHOLANGIOPANCREATOGRAM N/A 08/02/2016    Procedure: COMBINED ENDOSCOPIC RETROGRADE CHOLANGIOPANCREATOGRAPHY, PLACE TUBE/STENT;  Surgeon: Mandeep Park MD;  [facility]    ENDOSCOPIC RETROGRADE CHOLANGIOPANCREATOGRAM N/A 08/26/2016    Procedure: COMBINED ENDOSCOPIC RETROGRADE CHOLANGIOPANCREATOGRAPHY, REMOVE FOREIGN BODY OR STENT/TUBE;  Surgeon: Mandeep Park MD;  [facility]    ENDOSCOPIC ULTRASOUND UPPER GASTROINTESTINAL TRACT (GI) N/A 10/03/2016    Procedure: ENDOSCOPIC ULTRASOUND, ESOPHAGOSCOPY / UPPER GASTROINTESTINAL TRACT (GI);  Surgeon: Guru Jose Rodas MD;  Location:  OR    ENT SURGERY  1989    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    ENTEROSCOPY SMALL BOWEL N/A 9/11/2024    Procedure: Upper endoscopy, gastrostomy tube placement and jejunostomy tube exchange;  Surgeon: Zackery Montoya MD;  Location:  OR    ESOPHAGOSCOPY, GASTROSCOPY, DUODENOSCOPY (EGD), COMBINED N/A 06/24/2015    Procedure: COMBINED ESOPHAGOSCOPY, GASTROSCOPY, DUODENOSCOPY (EGD), REMOVE FOREIGN BODY;  Surgeon: Mandeep Park MD;  Location:  GI    ESOPHAGOSCOPY, GASTROSCOPY, DUODENOSCOPY (EGD), COMBINED N/A 10/25/2015    Procedure: COMBINED ESOPHAGOSCOPY, GASTROSCOPY, DUODENOSCOPY (EGD);  Surgeon: Sammy Amaro MD;  Location:  GI    ESOPHAGOSCOPY, GASTROSCOPY, DUODENOSCOPY (EGD), COMBINED N/A 10/25/2015    Procedure: COMBINED ESOPHAGOSCOPY, GASTROSCOPY, DUODENOSCOPY (EGD), BIOPSY SINGLE OR MULTIPLE;  Surgeon: Sammy Amaro MD;  Location:  GI     ESOPHAGOSCOPY, GASTROSCOPY, DUODENOSCOPY (EGD), COMBINED N/A 01/31/2023    Procedure: ESOPHAGOGASTRODUODENOSCOPY (EGD) with peg replacement ;  Surgeon: Mandeep Park MD;  [facility]    ESOPHAGOSCOPY, GASTROSCOPY, DUODENOSCOPY (EGD), COMBINED N/A 7/24/2024    Procedure: Esophagoscopy, gastroscopy, duodenoscopy (EGD), combined;  Surgeon: Zackery Montoya MD;  [facility]    ESOPHAGOSCOPY, GASTROSCOPY, DUODENOSCOPY (EGD), DILATATION, COMBINED      EXCISE LESION TRUNK N/A 04/17/2017    Procedure: EXCISE LESION TRUNK;  Removal of Abdominal Foreign Body;  Surgeon: Nestor Phoenix MD;  [facility]    HC ESOPH/GAS REFLUX TEST W NASAL IMPED >1 HR N/A 11/19/2015    Procedure: ESOPHAGEAL IMPEDENCE FUNCTION TEST WITH 24 HOUR PH GREATER THAN 1 HOUR;  Surgeon: Thiago Apple MD;  [facility]    HC UGI ENDOSCOPY DIAG W BIOPSY  09/17/2008    HC UGI ENDOSCOPY DIAG W BIOPSY  09/27/2012    HC UGI ENDOSCOPY W ESOPHAGEAL DILATION BALLOON <30MM  09/17/2008    HC UGI ENDOSCOPY W EUS N/A 05/05/2015    Procedure: COMBINED ENDOSCOPIC ULTRASOUND, ESOPHAGOSCOPY, GASTROSCOPY, DUODENOSCOPY (EGD);  Surgeon: Wm Dueñas MD;  [facility]    HC WRIST ARTHROSCOP,RELEASE XVERS LIG Bilateral 12/17/2008    INJECT TRANSVERSUS ABDOMINIS PLANE (TAP) BLOCK BILATERAL Left 09/22/2016    Procedure: INJECT TRANSVERSUS ABDOMINIS PLANE (TAP) BLOCK BILATERAL;  Surgeon: Dickson Corrigan MD;  [facility]    INJECT TRIGGER POINT Bilateral 09/08/2022    Procedure: Abdominal trigger point injection with ultrasound;  Surgeon: Monika Mahajan MD;  [facility]    INJECT TRIGGER POINT SINGLE / MULTIPLE 3 OR MORE MUSCLES Right 11/15/2022    Procedure: Trigger point injections right abdomen with ultrasound guidance;  Surgeon: Monika Mahajan MD;  [facility]: [facility]    IR CHEST PORT PLACEMENT < 5 YRS OF AGE  06/10/2022    IR CVC TUNNEL PLACEMENT > 5 YRS OF AGE  4/15/2024    IR PORT REMOVAL RIGHT  11/09/2023     laparoscopic pineda  01/01/1995    LAPAROSCOPIC HERNIORRHAPHY INCISIONAL N/A 08/23/2018    Procedure: LAPAROSCOPIC HERNIORRHAPHY INCISIONAL;  Laparoscopic Incisional Hernia Repair with Symbotex Mesh Implant;  Surgeon: Nestor Phoenix MD;  [facility]    LAPAROSCOPIC PANCREATECTOMY, TRANSPLANT AUTO ISLET CELL N/A 12/28/2016    Procedure: LAPAROSCOPIC PANCREATECTOMY, TRANSPLANT AUTO ISLET CELL;  Surgeon: Nestor Phoenix MD;  [facility]    LAPAROTOMY EXPLORATORY N/A 01/31/2023    Procedure: Exploratory Laparotomy, lysis of adhesions, Perforated J-Junostomy Resection, Replace J-Junostomy site;  Surgeon: Elver Bauer MD;  [facility]    LAPAROTOMY, LYSIS ADHESIONS, COMBINED N/A 01/31/2023    Procedure: Dilatation of jejunostomy feeding tube, track with placement of jejunostomy tube with fluoroscopy;  Surgeon: Elver Bauer MD;  [facility]    PICC DOUBLE LUMEN PLACEMENT Left 11/13/2023    Basilic Vein 5F DL 43 cm    REMOVE AND REPLACE BREAST IMPLANT PROSTHESIS N/A 12/30/2022    Procedure: Exploratory Laparotomy, lysis of adhesions, small bowel resection. Placement of gastric jejunostomy for enteral feeding.;  Surgeon: Elver Bauer MD;  [facility]    REMOVE GASTROSTOMY TUBE ADULT N/A 01/28/2022    Procedure: REMOVAL, GASTROSTOMY TUBE, ADULT;  Surgeon: Mandeep Park MD;  [facility]    REPLACE GASTROJEJUNOSTOMY TUBE, PERCUTANEOUS N/A 09/07/2021    Procedure: GASTROJEJUNOSTOMY TUBE PLACEMENT, PERCUTANEOUS, WITH GASTROPEXY;  Surgeon: Mandeep Park MD;  [facility]    REPLACE GASTROJEJUNOSTOMY TUBE, PERCUTANEOUS N/A 09/22/2021    Procedure: REPLACEMENT, GASTROJEJUNOSTOMY TUBE, PERCUTANEOUS;  Surgeon: Zackery Montoya MD;  [facility]    REPLACE GASTROJEJUNOSTOMY TUBE, PERCUTANEOUS N/A 11/22/2022    Procedure: REPLACEMENT, GASTROJEJUNOSTOMY TUBE, PERCUTANEOUS;  Surgeon: Mandeep Park MD;  [facility]    REPLACE  GASTROJEJUNOSTOMY TUBE, PERCUTANEOUS N/A 2022    Procedure: REPLACEMENT, GASTROJEJUNOSTOMY TUBE, PERCUTANEOUS with ENDOSCOPIC SUTURING.;  Surgeon: Mandeep Park MD;  [facility]    REPLACE GASTROJEJUNOSTOMY TUBE, PERCUTANEOUS N/A 2023    Procedure: Replace Gastrojejunostomy Tube, Percutaneous;  Surgeon: Mandeep Park MD;  [facility]    REPLACE GASTROJEJUNOSTOMY TUBE, PERCUTANEOUS N/A 2023    Procedure: Replace Gastrojejunostomy Tube, Percutaneous;  Surgeon: Francisco Dodson MD;  [facility]    REPLACE GASTROJEJUNOSTOMY TUBE, PERCUTANEOUS N/A 2023    Procedure: Replace Gastrojejunostomy Tube, Percutaneous;  Surgeon: Mandeep Park MD;  [facility]    REPLACE JEJUNOSTOMY TUBE, PERCUTANEOUS N/A 09/10/2021    Procedure: UPPER ENDOSCOPY, REPLACEMENT OF PERCUTANEOUS GASTROJEJUNOSTOMY TUBE, T-TAG GASTROPEXY;  Surgeon: Zackery Montoya MD;  [facility]    REPLACE JEJUNOSTOMY TUBE, PERCUTANEOUS N/A 2021    Procedure: REPLACEMENT, JEJUNOSTOMY TUBE, PERCUTANEOUS;  Surgeon: Mandeep Park MD;  [facility]    REPLACE JEJUNOSTOMY TUBE, PERCUTANEOUS N/A 2023    Procedure: REPLACEMENT, JEJUNOSTOMY TUBE, PERCUTANEOUS;  Surgeon: Mandeep Park MD;  [facility]    REPLACE JEJUNOSTOMY TUBE, PERCUTANEOUS  2023    Procedure: Replace Jejunostomy Tube, Percutaneous;  Surgeon: Mandeep Park MD;  [facility]    REPLACE JEJUNOSTOMY TUBE, PERCUTANEOUS N/A 2024    Procedure: REPLACEMENT, JEJUNOSTOMY TUBE, PERCUTANEOUS;  Surgeon: Francisco Dodson MD;  [facility]    transphenoidal pituitary resection  1990    Z  DELIVERY ONLY  1996    Z  DELIVERY ONLY  1998    repeat c section with incidental cystotomy with repair    Z EXCIS PITUITARY,TRANSNASAL/SEPTAL  1980    pituitary tumor removed for diabetes insipidus    [facility] TOTAL ABDOM HYSTERECTOMY  1999    w/ bilateral  salpingoophorectomy              Social History:      Social History     Tobacco Use    Smoking status: Former     Current packs/day: 0.00     Average packs/day: 0.5 packs/day for 6.3 years (3.2 ttl pk-yrs)     Types: Cigarettes     Start date: 1985     Quit date: 1992     Years since quittin.8    Smokeless tobacco: Not on file    Tobacco comments:     no 2nd hand   Substance Use Topics    Alcohol use: Not Currently     Alcohol/week: 3.0 - 6.0 standard drinks of alcohol     Types: 1 - 2 Glasses of wine, 1 - 2 Cans of beer, 1 - 2 Shots of liquor per week     Comment: none since IGG        History   Sexual Activity    Sexual activity: Yes    Partners: Male    Birth control/ protection: Male Surgical, Female Surgical, None     Comment: Hystectomy              Family History:    Reviewed and non-contributory.        Family History   Problem Relation Age of Onset    Lipids Mother     Hypertension Mother     Thyroid Disease Mother     Depression Mother     Angina Mother     GERD Mother     Skin Cancer Mother     Migraines Mother     Autoimmune Disease Mother     Hyperlipidemia Mother     Mental Illness Mother     Cerebrovascular Disease Mother         2023    Other Cancer Mother         skin-basil cell    Anxiety Disorder Mother     Post Operative Nausea and Vomiting Mother     Eye Disorder Father         cataract, detached retina    Myocardial Infarction Father 60    Lipids Father     Cerebrovascular Disease Father     Depression Father     Substance Abuse Father     Anesthesia Reaction Father         stroke right after surgery    Cataracts Father     Osteoarthritis Father     Ulcerative Colitis Father     Autoimmune Disease Father     Heart Disease Father     Hyperlipidemia Father     Mental Illness Father     Other Cancer Father         myloma    Anxiety Disorder Father     Interpersonal Violence Sister     Coronary Artery Disease Sister         angioplasty    GERD Sister     Substance Abuse Sister   "   Cerebrovascular Disease Sister         2005    Depression Sister     Thyroid Disease Sister     Autoimmune Disease Sister     Depression Sister     Mental Illness Sister     Substance Abuse Sister     Thyroid Disease Sister     Eye Disorder Maternal Grandmother         cataract    Thyroid Disease Maternal Grandmother     Diabetes Maternal Grandfather     Eye Disorder Paternal Grandmother         cataract    Diabetes Paternal Grandmother     Eye Disorder Paternal Grandfather         cataract    Diabetes Paternal Grandfather     Substance Abuse Paternal Grandfather     Eye Disorder Son         ptosis    Depression Son     Anxiety Disorder Son     Depression Son     Mental Illness Son     Anxiety Disorder Son     Heart Disease Paternal Aunt     Diabetes Paternal Aunt     Diabetes Paternal Uncle     Heart Disease Paternal Uncle     Depression Nephew     Anxiety Disorder Nephew     Thyroid Disease Nephew     Thyroid Disease Nephew     Anxiety Disorder Nephew     Diabetes Type 2  Cousin         paternal cousin    Autoimmune Disease Cousin              Physical Exam:    BP 94/56   Pulse 99   Temp 98.6  F (37  C) (Oral)   Resp 16   Ht 1.727 m (5' 8\")   Wt 61.6 kg (135 lb 12.8 oz)   SpO2 98%   BMI 20.65 kg/m       Pt was not examined today.              Laboratory Data:      Lab Results   Component Value Date    A1C 5.4 07/23/2024    A1C 5.6 03/20/2024    A1C 5.4 02/17/2022    A1C 5.2 11/27/2021    A1C 5.5 09/18/2021    A1C 5.4 08/05/2021    A1C 5.7 05/12/2021    A1C 5.9 (A) 04/01/2021    A1C 5.5 12/17/2020    A1C 5.8 (H) 07/30/2020     Recent Labs   Lab Test 10/12/24  0525   WBC 11.9*   RBC 3.56*   HGB 10.6*   HCT 33.8*   MCV 95   MCH 29.8   MCHC 31.4*   RDW 12.7        Recent Labs   Lab Test 10/12/24  1157 10/12/24  0747 10/12/24  0525 10/11/24  2255 10/11/24  2000   NA  --   --  135  --  137   POTASSIUM  --   --  4.6  --  3.9   CHLORIDE  --   --  99  --  100   CO2  --   --  26  --  26   ANIONGAP  --   --  " 10  --  11   * 230* 243*   < > 204*   BUN  --   --  12.2  --  13.4   CR  --   --  0.61  --  0.65   KASSI  --   --  8.4*  --  8.5*    < > = values in this interval not displayed.     Recent Labs   Lab Test 10/12/24  0525   PROTTOTAL 5.6*   ALBUMIN 3.5   BILITOTAL 0.9   ALKPHOS 71   AST 31   ALT 19            Assessment and Recommendations:       Assessment:   Pancreatogenic diabetes s/p pancreatectomy and TPAIT in 2016, C peptide- 2.5 in 02/17/2022 reflecting + insulin stores, A1C-6.3% in 09/7/23  S/p exploratory laparotomy, extensive lysis of adhesions, revision of J tube,and small bowel resection on 10/11/24, on continous TPN use now  Chronic malnutrition, on TPN X12 hour, 3 days/week and Tube feeds x 12 hours, 7 days/week at home  Hypothyroidism  Poor oral intake  Continuous TPN related hyperglycemia    BG at goal, on current regimen.   No changes in insulin regimen today.    Plan/Recommendations:     - Continue Lantus 3 units q 24 hrs for 24 hour insulin dosing.   - Continue Novolog Correction Scale: medium resistance (ISF: 1:50 )every 4 hours.  - BG monitoring: Every 4 hours.  - Hypoglycemia protocol    - Carb counting protocol when starts eating.    Discharge Planning: (tentative)    Medications: TBD    Thank you for this consult. IDS will continue to follow.      Please notify Inpatient Diabetes Service if changes are planned to steroids, nutrition, TPN/TF and anticipated procedures requiring prolonged NPO status.     Plan discussed with attending.    Lawrence Sims MD  PGY-5  Endocrine fellow      Patient was not seen by endocrine me today. I have reviewed records, discussed patient's care and edited the fellow's note. I agree with the plan of care documented.    Sergey Fountain MD   Division of Diabetes, Endocrinology and Metabolism  Department of Medicine

## 2024-10-13 NOTE — PLAN OF CARE
Goal Outcome Evaluation:    Plan of Care Reviewed With: patient, spouse  Overall Patient Progress: improving    POD#2 TAC and ileorectal anastomosis, diverting loop ileostomy on 10/11 for slow transit constipation     Still tachy in the upper 90s -100s, OVSS on RA  LS clear-IS encouraged.  +BS, ileostomy with watery yellow output.  Bladder scanned for 926 ml, replaced rose with 1025ml immediate output.  Tolerating clears with TPN/lipids, crushed meds given thru G-tube and liquid meds thru J-tube per pt's preference. BGs q4, insulin per sliding scale given.  SBA with walks  Pain in abd  controlled with PCA dilaudid, tylenol, baclofen  On scheduled phenergan IV for nausea. Scop patch behind rt ear.  J and G-tubes to gravity when not in use for meds.  Midline incision with intact dressing.  Rt PIV is sl'd.  Double lumen CVC with TPN  and PCA infusing. Both lumens are drawing blood but not enough for labs. Lab collect used. Mag and phos replaced, came back normal on rechecks.  Continue to monitor gen status and continue with POC.

## 2024-10-14 LAB
ALBUMIN SERPL BCG-MCNC: 3.1 G/DL (ref 3.5–5.2)
ALP SERPL-CCNC: 80 U/L (ref 40–150)
ALT SERPL W P-5'-P-CCNC: 14 U/L (ref 0–50)
ANION GAP SERPL CALCULATED.3IONS-SCNC: 8 MMOL/L (ref 7–15)
AST SERPL W P-5'-P-CCNC: 23 U/L (ref 0–45)
BILIRUB SERPL-MCNC: 0.5 MG/DL
BUN SERPL-MCNC: 10.6 MG/DL (ref 6–20)
CALCIUM SERPL-MCNC: 8.7 MG/DL (ref 8.8–10.4)
CHLORIDE SERPL-SCNC: 97 MMOL/L (ref 98–107)
CREAT SERPL-MCNC: 0.52 MG/DL (ref 0.51–0.95)
EGFRCR SERPLBLD CKD-EPI 2021: >90 ML/MIN/1.73M2
ERYTHROCYTE [DISTWIDTH] IN BLOOD BY AUTOMATED COUNT: 12.9 % (ref 10–15)
GLUCOSE BLDC GLUCOMTR-MCNC: 110 MG/DL (ref 70–99)
GLUCOSE BLDC GLUCOMTR-MCNC: 128 MG/DL (ref 70–99)
GLUCOSE BLDC GLUCOMTR-MCNC: 181 MG/DL (ref 70–99)
GLUCOSE BLDC GLUCOMTR-MCNC: 181 MG/DL (ref 70–99)
GLUCOSE BLDC GLUCOMTR-MCNC: 184 MG/DL (ref 70–99)
GLUCOSE BLDC GLUCOMTR-MCNC: 195 MG/DL (ref 70–99)
GLUCOSE SERPL-MCNC: 297 MG/DL (ref 70–99)
HCO3 SERPL-SCNC: 29 MMOL/L (ref 22–29)
HCT VFR BLD AUTO: 28.6 % (ref 35–47)
HGB BLD-MCNC: 9.2 G/DL (ref 11.7–15.7)
INR PPP: 1.01 (ref 0.85–1.15)
MAGNESIUM SERPL-MCNC: 1.7 MG/DL (ref 1.7–2.3)
MCH RBC QN AUTO: 31 PG (ref 26.5–33)
MCHC RBC AUTO-ENTMCNC: 32.2 G/DL (ref 31.5–36.5)
MCV RBC AUTO: 96 FL (ref 78–100)
PHOSPHATE SERPL-MCNC: 4.5 MG/DL (ref 2.5–4.5)
PLATELET # BLD AUTO: 270 10E3/UL (ref 150–450)
POTASSIUM SERPL-SCNC: 3.8 MMOL/L (ref 3.4–5.3)
PROT SERPL-MCNC: 6 G/DL (ref 6.4–8.3)
RBC # BLD AUTO: 2.97 10E6/UL (ref 3.8–5.2)
SODIUM SERPL-SCNC: 134 MMOL/L (ref 135–145)
WBC # BLD AUTO: 14.1 10E3/UL (ref 4–11)

## 2024-10-14 PROCEDURE — B4185 PARENTERAL SOL 10 GM LIPIDS: HCPCS | Performed by: COLON & RECTAL SURGERY

## 2024-10-14 PROCEDURE — 120N000002 HC R&B MED SURG/OB UMMC

## 2024-10-14 PROCEDURE — 250N000011 HC RX IP 250 OP 636: Performed by: STUDENT IN AN ORGANIZED HEALTH CARE EDUCATION/TRAINING PROGRAM

## 2024-10-14 PROCEDURE — 250N000013 HC RX MED GY IP 250 OP 250 PS 637: Performed by: COLON & RECTAL SURGERY

## 2024-10-14 PROCEDURE — 250N000009 HC RX 250

## 2024-10-14 PROCEDURE — 250N000011 HC RX IP 250 OP 636: Mod: JZ

## 2024-10-14 PROCEDURE — 258N000003 HC RX IP 258 OP 636: Performed by: SURGERY

## 2024-10-14 PROCEDURE — 250N000011 HC RX IP 250 OP 636

## 2024-10-14 PROCEDURE — 80069 RENAL FUNCTION PANEL: CPT | Performed by: COLON & RECTAL SURGERY

## 2024-10-14 PROCEDURE — 83735 ASSAY OF MAGNESIUM: CPT | Performed by: COLON & RECTAL SURGERY

## 2024-10-14 PROCEDURE — 250N000009 HC RX 250: Performed by: SURGERY

## 2024-10-14 PROCEDURE — 84155 ASSAY OF PROTEIN SERUM: CPT | Performed by: COLON & RECTAL SURGERY

## 2024-10-14 PROCEDURE — 999N000128 HC STATISTIC PERIPHERAL IV START W/O US GUIDANCE

## 2024-10-14 PROCEDURE — 85027 COMPLETE CBC AUTOMATED: CPT | Performed by: STUDENT IN AN ORGANIZED HEALTH CARE EDUCATION/TRAINING PROGRAM

## 2024-10-14 PROCEDURE — 85610 PROTHROMBIN TIME: CPT | Performed by: COLON & RECTAL SURGERY

## 2024-10-14 PROCEDURE — G0463 HOSPITAL OUTPT CLINIC VISIT: HCPCS

## 2024-10-14 PROCEDURE — 250N000013 HC RX MED GY IP 250 OP 250 PS 637

## 2024-10-14 PROCEDURE — 250N000009 HC RX 250: Performed by: COLON & RECTAL SURGERY

## 2024-10-14 PROCEDURE — 250N000011 HC RX IP 250 OP 636: Performed by: SURGERY

## 2024-10-14 PROCEDURE — 99232 SBSQ HOSP IP/OBS MODERATE 35: CPT | Performed by: PHYSICIAN ASSISTANT

## 2024-10-14 PROCEDURE — 250N000013 HC RX MED GY IP 250 OP 250 PS 637: Performed by: SURGERY

## 2024-10-14 RX ORDER — HEPARIN SODIUM,PORCINE 10 UNIT/ML
5-20 VIAL (ML) INTRAVENOUS EVERY 24 HOURS
Status: DISCONTINUED | OUTPATIENT
Start: 2024-10-14 | End: 2024-10-28 | Stop reason: HOSPADM

## 2024-10-14 RX ORDER — METHOCARBAMOL 100 MG/ML
500 INJECTION, SOLUTION INTRAMUSCULAR; INTRAVENOUS EVERY 8 HOURS
Status: COMPLETED | OUTPATIENT
Start: 2024-10-14 | End: 2024-10-17

## 2024-10-14 RX ORDER — LIDOCAINE 4 G/G
1 PATCH TOPICAL
Status: DISCONTINUED | OUTPATIENT
Start: 2024-10-14 | End: 2024-10-16

## 2024-10-14 RX ORDER — TAMSULOSIN HYDROCHLORIDE 0.4 MG/1
0.4 CAPSULE ORAL DAILY
Status: DISCONTINUED | OUTPATIENT
Start: 2024-10-14 | End: 2024-10-16

## 2024-10-14 RX ORDER — HEPARIN SODIUM,PORCINE 10 UNIT/ML
5-20 VIAL (ML) INTRAVENOUS
Status: DISCONTINUED | OUTPATIENT
Start: 2024-10-14 | End: 2024-10-28 | Stop reason: HOSPADM

## 2024-10-14 RX ADMIN — Medication 5 ML: at 14:38

## 2024-10-14 RX ADMIN — Medication 50 MG: at 20:41

## 2024-10-14 RX ADMIN — INSULIN ASPART 1 UNITS: 100 INJECTION, SOLUTION INTRAVENOUS; SUBCUTANEOUS at 04:54

## 2024-10-14 RX ADMIN — ACETAMINOPHEN 650 MG: 325 SOLUTION ORAL at 08:24

## 2024-10-14 RX ADMIN — LEVOTHYROXINE SODIUM 150 MCG: 75 TABLET ORAL at 08:24

## 2024-10-14 RX ADMIN — HEPARIN SODIUM 5000 UNITS: 5000 INJECTION, SOLUTION INTRAVENOUS; SUBCUTANEOUS at 08:32

## 2024-10-14 RX ADMIN — PROMETHAZINE HYDROCHLORIDE 25 MG: 25 INJECTION, SOLUTION INTRAMUSCULAR; INTRAVENOUS at 14:55

## 2024-10-14 RX ADMIN — PROMETHAZINE HYDROCHLORIDE 25 MG: 25 INJECTION, SOLUTION INTRAMUSCULAR; INTRAVENOUS at 20:35

## 2024-10-14 RX ADMIN — PANTOPRAZOLE SODIUM 40 MG: 40 INJECTION, POWDER, FOR SOLUTION INTRAVENOUS at 08:32

## 2024-10-14 RX ADMIN — BACLOFEN 20 MG: 10 TABLET ORAL at 14:25

## 2024-10-14 RX ADMIN — ACETAMINOPHEN 650 MG: 325 SOLUTION ORAL at 20:40

## 2024-10-14 RX ADMIN — BACLOFEN 20 MG: 10 TABLET ORAL at 20:40

## 2024-10-14 RX ADMIN — INSULIN ASPART 1 UNITS: 100 INJECTION, SOLUTION INTRAVENOUS; SUBCUTANEOUS at 22:22

## 2024-10-14 RX ADMIN — TAMSULOSIN HYDROCHLORIDE 0.4 MG: 0.4 CAPSULE ORAL at 08:25

## 2024-10-14 RX ADMIN — METHOCARBAMOL 500 MG: 100 INJECTION INTRAMUSCULAR; INTRAVENOUS at 09:38

## 2024-10-14 RX ADMIN — Medication 60 MG: at 20:41

## 2024-10-14 RX ADMIN — BACLOFEN 20 MG: 10 TABLET ORAL at 08:25

## 2024-10-14 RX ADMIN — INSULIN GLARGINE 3 UNITS: 100 INJECTION, SOLUTION SUBCUTANEOUS at 16:18

## 2024-10-14 RX ADMIN — SMOFLIPID 250 ML: 6; 6; 5; 3 INJECTION, EMULSION INTRAVENOUS at 20:30

## 2024-10-14 RX ADMIN — MAGNESIUM SULFATE HEPTAHYDRATE: 500 INJECTION, SOLUTION INTRAMUSCULAR; INTRAVENOUS at 20:29

## 2024-10-14 RX ADMIN — ALTEPLASE 2 MG: 2.2 INJECTION, POWDER, LYOPHILIZED, FOR SOLUTION INTRAVENOUS at 05:18

## 2024-10-14 RX ADMIN — PROCHLORPERAZINE EDISYLATE 10 MG: 5 INJECTION INTRAMUSCULAR; INTRAVENOUS at 02:18

## 2024-10-14 RX ADMIN — HEPARIN SODIUM 5000 UNITS: 5000 INJECTION, SOLUTION INTRAVENOUS; SUBCUTANEOUS at 16:18

## 2024-10-14 RX ADMIN — ACETAMINOPHEN 650 MG: 325 SOLUTION ORAL at 13:03

## 2024-10-14 RX ADMIN — LIDOCAINE 4% 1 PATCH: 40 PATCH TOPICAL at 10:04

## 2024-10-14 RX ADMIN — METHOCARBAMOL 500 MG: 100 INJECTION INTRAMUSCULAR; INTRAVENOUS at 16:18

## 2024-10-14 RX ADMIN — PROMETHAZINE HYDROCHLORIDE 25 MG: 25 INJECTION, SOLUTION INTRAMUSCULAR; INTRAVENOUS at 08:17

## 2024-10-14 RX ADMIN — INSULIN ASPART 2 UNITS: 100 INJECTION, SOLUTION INTRAVENOUS; SUBCUTANEOUS at 10:05

## 2024-10-14 RX ADMIN — HEPARIN SODIUM 5000 UNITS: 5000 INJECTION, SOLUTION INTRAVENOUS; SUBCUTANEOUS at 00:38

## 2024-10-14 RX ADMIN — ACETAMINOPHEN 650 MG: 325 SOLUTION ORAL at 16:18

## 2024-10-14 RX ADMIN — INSULIN ASPART 1 UNITS: 100 INJECTION, SOLUTION INTRAVENOUS; SUBCUTANEOUS at 00:37

## 2024-10-14 ASSESSMENT — ACTIVITIES OF DAILY LIVING (ADL)
ADLS_ACUITY_SCORE: 33
DEPENDENT_IADLS:: INDEPENDENT
ADLS_ACUITY_SCORE: 35
ADLS_ACUITY_SCORE: 33
ADLS_ACUITY_SCORE: 35
ADLS_ACUITY_SCORE: 33
ADLS_ACUITY_SCORE: 35
ADLS_ACUITY_SCORE: 35
ADLS_ACUITY_SCORE: 33
ADLS_ACUITY_SCORE: 33
ADLS_ACUITY_SCORE: 35
ADLS_ACUITY_SCORE: 33
ADLS_ACUITY_SCORE: 33
ADLS_ACUITY_SCORE: 35
ADLS_ACUITY_SCORE: 33

## 2024-10-14 NOTE — PLAN OF CARE
Goal Outcome Evaluation:      Plan of Care Reviewed With: patient    Overall Patient Progress: no changeOverall Patient Progress: no change    Outcome Evaluation: POD#2    [id number]: Dinora ia A&Ox4 with VSS on RA. No complaints of SOB. Pain continued in abdomen/left side. Pain pump in use and actively using button. No nausea complained of this shift. G tube and J tube continue to gravity with minimal OP. Ostomy with adequate OP. [place] with GUOP.. CVC infusing TPN/Lipids and PCA dilaudid. Both lumens without brisk blood return. Paged for TPA. TPA administered into purple lumen, no BR after 30 minutes, no BR after 1 hour, 90 minute [place] is at 0718. BG Q4 covered with sliding scale insulin. SBA when OOB. Continues with CLD. Sleeping between cares. Mason to make needs known. Continue to monitor drains, pain, and post of recovery and intervene as necessary.

## 2024-10-14 NOTE — PROGRESS NOTES
AUTH REQUIRED DAY ORDERS (or CHANGE IN ORDERS) ARE RECEIVED. PLEASE NOTIFY PA TEAM ONCE ORDERS RECEIVED.    05/02/2023 TPN IS COVERED. CAN BE IVN IF PT NEEDS NURSING. WM  05/03/2024: PT HAS COVERAGE FOR ENTERAL AS LONG AS VIA TUBE.   PT HAS TPA, HYDRATION, AND LINE CARE COVERAGE [facility]

## 2024-10-14 NOTE — PROGRESS NOTES
Colorectal Surgery Progress Note  [facility]  POD#3      Subjective: Piedra replaced yesterday due to urinary retention. Nausea this morning, controlled with compazine. Pain moderately controlled, but reports back pain from bed. Gas and bowel sweat output from ostomy.    Vitals:  Vitals:    10/13/24 1811 10/13/24 1958 10/14/24 0040 10/14/24 0409   BP: 99/51 115/70 119/70 119/68   BP Location: Right arm Right arm Right arm Right arm   Cuff Size: Adult Regular      Pulse: 92 97 96 104   Resp: 16 16 16 16   Temp:  99.1  F (37.3  C) 99  F (37.2  C) 98.9  F (37.2  C)   TempSrc:  Oral Oral    SpO2: 100% 99% 97% 95%   Weight:       Height:         I/O:  I/O last 3 completed shifts:  In: 3921.29 [P.O.:760; I.V.:575.17; Other:110; NG/GT:210]  Out: 2900 [Urine:2550; Emesis/NG output:200; Stool:150]    Physical Exam:  Gen: AAOx3, NAD  Pulm: Non-labored breathing  Abd: Soft, non-distended, appropriately tender, no guarding   Midline incision clean, dry, and intact    Stoma pink and viable with liquid and gas in bag   KELI drain with serosanguineous output  Ext:  Warm and well-perfused    BMP  Recent Labs   Lab 10/14/24  0405 10/14/24  0040 10/13/24  2008 10/13/24  1759 10/13/24  1606 10/13/24  1450 10/13/24  0757 10/13/24  0606 10/12/24  0747 10/12/24  0525 10/11/24  2255 10/11/24  2000   NA  --   --   --   --   --   --   --  133*  --  135  --  137   POTASSIUM  --   --   --   --   --   --   --  4.1  --  4.6  --  3.9   CHLORIDE  --   --   --   --   --   --   --  100  --  99  --  100   CO2  --   --   --   --   --   --   --  28  --  26  --  26   BUN  --   --   --   --   --   --   --  11.3  --  12.2  --  13.4   CR  --   --   --   --   --   --   --  0.50*  --  0.61  --  0.65   * 181* 134*  --  137*  --    < > 186*   < > 243*   < > 204*   MAG  --   --   --   --   --  2.2  --  1.4*  --  1.8  --  1.7   PHOS  --   --   --  3.0  --   --   --  1.8*  --  2.8  --  4.2    < > = values in this interval not  displayed.     CBC  Recent Labs   Lab 10/13/24  0606 10/12/24  0525   WBC 14.8* 11.9*   HGB 9.3* 10.6*   HCT 29.5* 33.8*    309         ASSESSMENT: This is a 59 year old female s/p TAC and ileorectal anastomosis, diverting loop ileostomy on 10/11 for slow transit constipation. PMH chronic pancreatitis s/p TPAT 2016, gastroparesis with G and J tubes. On TPN and J tube feeds with venting G tube at home. Pt receives all meds via J tube due to dysphagia.     Plan today  - continue PCA  - DM II with TPAT: home basal/bolus regimen. Consulted endo, appreciate recs  - clear liquid diet, continue to hold tube feeds while awaiting ROBF  - home depression, nausea, and hypothyroid medications  - continue TPN and lipid infusion  - continue rose  - J-tube balloon deflated 4mL this morning by CRS team, re-evaluate for clamping later today  - Lidocaine patches and robaxin for back pain  - start tamsulosin for urinary retention    Neuro/Pain: PCA, Tylenol, oxy, robaxin  CV: monitor HR, BP  PULM: encourage IS.  GI/FEN: CLD, TPN. Hold home TF  : maintain rose, start tamsulosin  Heme: acute blood loss anemia: monitor hgb, transfuse for hgb < 7  ID: no abx  Endocrine: DM II with TPAT: home basal/bolus regimen. Consulted endo, appreciate recs  - NPO, home TPN through weekend. Hold home TF  Lines: G tube, J tube, port  Activity: as tolerated, encourage ambulation  Ppx: DVT ppx held  Dispo: pending clinical course      Patient was seen and discussed with Dr. Johnson, who discussed with Dr. Branch.    Santiago Zaldivar MD, PGY-1  General Surgery Resident

## 2024-10-14 NOTE — CONSULTS
[facility] Nurse Inpatient Assessment     Patient History (according to provider note(s):      60 y/o female has a past medical history of Allergic rhinitis, cause unspecified, Allergy to other foods, Arthritis, Choledocholithiasis, Chronic abdominal pain, Chronic constipation, Chronic nausea, Chronic pancreatitis (H), Degeneration of lumbar or lumbosacral intervertebral disc, Esophageal reflux, Gastroparesis, Hiatal hernia, History of pituitary adenoma, Hypothyroidism, Migraines, Mild hyperlipidemia, On tube feeding diet, Pancreatic disease, PONV (postoperative nausea and vomiting), Portacath in place, Type 1 diabetes mellitus without complication (H) (5/9/2022), and Unspecified hearing loss.     Assessment:    Assessment of new end Ileostomy:  Diagnosis Pertinent to Stoma:    Slow transit constipation       Procedure: TOTAL ABDOMINAL COLECTOMY WITH ILEORECTAL ANASTAMOSIS, DIVERTING LOOP ILEOSTOMY,   Surgery Date: 10/11/24  Surgeon:[facility]: [facility]  Pouching system in place on assessment today: Leandro one piece, cut to fit, flat, and barrier ring   Pouch barrier status: Minimal melting  Pouch last changed/wear time: 2 days  Reason for pouch change today: ostomy education and routine schedule  Effectiveness of current pouching/ supply plan: Attempting new system, will re-evaluate next assessment  Change made with ostomy management today: Yes  Pouching system placed today: [place] two piece, cut to fit, flat, and barrier ring   Supplies: gathered, supplies stored on unit, and discussed with patient     Stoma location: RLQ  Stoma size: 1 1/4 inches,   Stoma appearance: red, round, moist, good turgor, edematous, and protruberant  Mucocutaneous junction:  intact  Peristomal complication(s): none-but one clear serum filled blister at 11 o'clock outside edge of the barrier-likely OR related, does not appear to be related to the ostomy barrier    Output: serosanguinous bowel sweat  Output volume emptied during visit: 5ml  Abdominal assessment: Firm  Surgical site(s): open to air and staples intact  NG still in place? No  Pain: Sharp  Is patient still on a PCA? Yes    Ostomy education assessment:  Participant of teaching session today: patient   Education completed today: Initial fitting, Pouching system assessment , Pouch change return demonstration, Pouch emptying return demonstration, Intake and output recording, Fluid and electrolyte balance , Infection prevention/hygiene , and Lifestyle adjustments   Educational materials/methods: Demonstration, Return demonstration, FOD [place] education hand out, Addressed patient/caregiver questions/concerns, and Left packet of information at bedside   Education still needed: Pouching system assessment , Adjustment of pouching plan, Pouch change return demonstration, Pouch emptying return demonstration, When to seek medical attention, Low fiber diet , Odor/flatus management , Infection prevention/hygiene , Hernia prevention, and Discharge instructions  Learning Comprehension:   Psychosocial assessment: is a retired  and has had to manage TPN, drains at home with home care assist  Patient readiness for education today: active participation  Following today's visit: patient  is able to demonstrate;         1. How to empty their pouch? Yes and with minimal assist        2. How to change their pouch? Yes and with moderate assist        3. How to read and record intake and output correctly? Yes and Demo provided   Preparation for discharge completed: No discharge preparation started yet  Preparation for discharge still needed: No discharge preparation started yet  Pt support system on discharge: spouse  WOC recommend home care?  yes  Face to face time: 45 minutes    Treatment Plan:   RLQ Ileostomy pouching plan:   Pouching system: ostomy supplies pouches: [place] 57 FECAL (323504) ostomy supplies barrier:  "Leandro 57mm FLAT ([id number])  Accessories used: [facility] ostomy accessories: 2\" Cera Barrier Ring ([id number])   Frequency of pouch changes: PRN leakage and Three times a week  WO follow up plan: Daily Monday-Friday (as able)  Bedside RN interventions: Change pouch PRN if leaking using the supplies above, Empty pouch when 1/3 to 1/2 full, ensure to clean pouch outlet after emptying to prevent odor, Notify WOC for ongoing pouch leakage, Document stoma appearance and output volume, color, and consistency every shift, and Encourage patient to empty pouch with assist     DATA:     Current support surface: Standard  Standard gel mattress (Isoflex)    BMI: Body mass index is 20.56 kg/m .   Active diet order: Orders Placed This Encounter      Clear Liquid Diet     Output: I/O last 3 completed shifts:  In: 3921.29 [P.O.:760; I.V.:575.17; Other:110; NG/GT:210]  Out: 2900 [Urine:2550; Emesis/NG output:200; Stool:150]     Labs:   Recent Labs   Lab 10/14/24  0734 10/13/24  0606   ALBUMIN 3.1* 3.2*   PREALB  --  8.1*   HGB 9.2* 9.3*   INR 1.01 1.20*   WBC 14.1* 14.8*     Pressure injury risk assessment:   Sensory Perception: 4-->no impairment  Moisture: 4-->rarely moist  Activity: 3-->walks occasionally  Mobility: 3-->slightly limited  Nutrition: 3-->adequate  Friction and Shear: 3-->no apparent problem  Martinez Score: 20    Pager no longer is use, please contact through [employer] group: [facility] Nurse Scott  Dept. Office Number: [phone]    "

## 2024-10-14 NOTE — PROGRESS NOTES
Pt reported having aching shoulder pain, provider ordered Icy hot, awaiting pharmacy to send up. No on clear liquid diet and tolerating well. Went for a walk. PCA in place.

## 2024-10-14 NOTE — CONSULTS
Care Management Initial Consult    General Information  Assessment completed with: Patient,    Type of CM/SW Visit: Initial Assessment    Primary Care Provider verified and updated as needed: Yes   Readmission within the last 30 days: no previous admission in last 30 days      Reason for Consult: discharge planning  Advance Care Planning: Advance Care Planning Reviewed: present on chart, verified with patient        Communication Assessment  Patient's communication style: spoken language (English or Bilingual)    Hearing Difficulty or Deaf: no   Wear Glasses or Blind: yes    Cognitive  Cognitive/Neuro/Behavioral: WDL  Level of Consciousness: alert     Orientation: oriented x 4             Living Environment:   People in home: spouse, Norris  Current living Arrangements: house  Able to return to prior arrangements: yes     Family/Social Support:  Care provided by: self, spouse/significant other  Provides care for: no one.   Her parents live nearby, she doesn't provide any care for them.     Marital Status:   Support system: Children, Friend,  - Norris       Description of Support System: Supportive    Support Assessment: Adequate family and caregiver support, Adequate social supports    Current Resources:   Patient receiving home care services: Yes  Skilled Home Care Services: Skilled Nursing    Community Resources: Home Infusion, [facility]     Equipment currently used at home: none  Supplies currently used at home: TF, TPN and Ann supplies    Employment/Financial:  Employment Status: disabled        Financial Concerns: none     Does the patient's insurance plan have a 3 day qualifying hospital stay waiver?  No    Lifestyle & Psychosocial Needs:  Social Determinants of Health     Food Insecurity: Low Risk  (10/12/2024)    Food Insecurity     Within the past 12 months, did you worry that your food would run out before you got money to buy more?: No     Within the past 12 months, did  the food you bought just not last and you didn t have money to get more?: No   Depression: Not at risk (10/8/2024)    PHQ-2     PHQ-2 Score: 1   Housing Stability: Low Risk  (10/12/2024)    Housing Stability     Do you have housing? : Yes     Are you worried about losing your housing?: No   Tobacco Use: Medium Risk (10/8/2024)    Patient History     Smoking Tobacco Use: Former     Smokeless Tobacco Use: Unknown     Passive Exposure: Not on file   Financial Resource Strain: Low Risk  (10/12/2024)    Financial Resource Strain     Within the past 12 months, have you or your family members you live with been unable to get utilities (heat, electricity) when it was really needed?: No   Alcohol Use: Not At Risk (9/28/2022)    [facility], [facility]    AUDIT-C     Frequency of Alcohol Consumption: Never     Average Number of Drinks: Not on file     Frequency of Binge Drinking: Not on file   Transportation Needs: Low Risk  (10/12/2024)    Transportation Needs     Within the past 12 months, has lack of transportation kept you from medical appointments, getting your medicines, non-medical meetings or appointments, work, or from getting things that you need?: No   Physical Activity: Sufficiently Active (3/19/2024)    Exercise Vital Sign     Days of Exercise per Week: 4 days     Minutes of Exercise per Session: 40 min   Interpersonal Safety: Low Risk  (10/11/2024)    Interpersonal Safety     Do you feel physically and emotionally safe where you currently live?: Yes     Within the past 12 months, have you been hit, slapped, kicked or otherwise physically hurt by someone?: No     Within the past 12 months, have you been humiliated or emotionally abused in other ways by your partner or ex-partner?: No   Stress: Stress Concern Present (3/19/2024)    [place] [place] [employer] - Occupational Stress Questionnaire     Feeling of Stress : To some extent   Social Connections: Unknown (3/19/2024)    Social  Connection and Isolation Panel [NHANES]     Frequency of Communication with Friends and Family: Not on file     Frequency of Social Gatherings with Friends and Family: Once a week     Attends Confucianism Services: Not on file     Active Member of Clubs or Organizations: Not on file     Attends Club or Organization Meetings: Not on file     Marital Status: Not on file   Health Literacy: Not on file       Functional Status:  Prior to admission patient needed assistance:   Dependent ADLs:: Independent  Dependent IADLs:: Independent (support from , children and friends)    Mental Health Status:  Mental Health Status: Other (see comment) (general mental health concerns, did not specify)  Mental Health Management: Individual Therapy    Chemical Dependency Status:  Chemical Dependency Status: No Current Concerns       Values/Beliefs:  Spiritual, Cultural Beliefs, Confucianism Practices, Values that affect care: no               Discussed  Partnership in Safe Discharge Planning  document with patient/family: Yes: Patient would like to continue with current home care agency.     Additional Information:  RNCC met with patient and the bedside. Introduced self and role of Care Management in discharge planning. 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     Verified that current address, PCP and insurance are up to date. Dinora plans to discharge to home address with assist of her  and adult children, along with resumption of Home Care services as previously arranged.     Dinora elaborated on complicated family dynamics, but validates she has a supportive and involved core group of friends and family who she can rely on for support through her ongoing medical needs. She endorses an ongoing relationship with a therapist who helps her manage her emotional health. Confirmed that Dinora has current ACP documents on file and is in agreement with  No CPR - Do NOT Intubate status. She is hopeful that once she recovers from surgery, having the new ostomy will provide improved quality of life.     Current Services:   [facility])    Services Available   Home Health Services      Address   [address]  [place] [address]             Contact Information    [phone]        [facility]   [phone]  CARLYN Benavides liaison   [phone]  Teams     RNCC sent a teams message to CARLYN Benavides liasion. She confirmed that patient is open to [facility] for her IV and enteral supplies, and [facility] does her nursing cares  Resumption of care orders placed for both home care and home infusion  Primary Care - Care Coordination Referral order placed per automatic BPA    Next Steps:     Follow up in care rounds to confirm any changes in discharge needs  Update [facility] and [facility] as needed   Patient's  can provide     NI Garcia  Care Management Department  Phone: [phone]  5A room 6348-1051  5C (non-BMT) room 8828-6778

## 2024-10-14 NOTE — PLAN OF CARE
Goal Outcome Evaluation:      Plan of Care Reviewed With: patient    Overall Patient Progress: no changeOverall Patient Progress: no change    [id number]  Status: S/p 10/11/24: Exploratory laparotomy, extensive lysis of adhesions, revision of Jtube and small bowel resection, TOTAL ABDOMINAL COLECTOMY WITH ILEORECTAL ANASTAMOSIS, DIVERTING LOOP ILEOSTOMY, flexible sigmoidoscopy (Abdomen) with Penelope Peralta MD  VS:Afebrile. VSS on RA   Neuros: A&O x4, CMS intact   Cardiac: WNL   Respiratory: WNL on RA   GI/: Piedra for retention w/ adequate UOP. Mucous sweat from Ileostomy   Diet/Nausea Clears. Persistent low grade nausea w/ scheduled phenergan given   Skin: Abdominal midline stapled, JACKIE, CDI   LDA: R CVC (purple) w/ TPN, (red) + Hep locked. L PIV TKO w/ PCA dilaudid   Labs: Reviewed   Pain: C/o 5/10 abdominal pain managed w/ PCA dilaudid 0.2 mg q10 min, scheduled baclofen, + tylenol   Activity: Up SBA   Plan: Pain management, awaiting ROBF

## 2024-10-14 NOTE — PLAN OF CARE
"Goal Outcome Evaluation:      Plan of Care Reviewed With: patient    Overall Patient Progress: no changeOverall Patient Progress: no change     1500 - 1930:   BP 92/58 (BP Location: Right arm)   Pulse 97   Temp 98.1  F (36.7  C) (Oral)   Resp 18   Ht 1.727 m (5' 8\")   Wt 61.3 kg (135 lb 3.2 oz)   SpO2 100%   BMI 20.56 kg/m      Today is POD #3 TAC & ileorectal anastomosis, diverting loop ileostomy.  A&O x 4, AVSS ex soft BP in 90's/50's, L abdomen incision pain 7/10, on pca dilaudid (see MAR).  Gave scheduled IV robaxin & tylenol.  Pt having some intermittent nausea, no complaint this shift, on scheduled phenergan.  Midline incision with staples, JACKIE, CDI.  Piedra patent with adequate UOP, ileostomy with mucous sweat, waiting for bowel function.  On clear liquid, cont's TPN & lipids, , no insulin needed this shift.   Up with sba, ambulated in hallway x 2 this shift.  Continue with poc.....        "

## 2024-10-14 NOTE — PROGRESS NOTES
Inpatient Diabetes Management Service: Daily Progress Note     HPI: Dinora Mcghee is a 59 year old with pancreatogenic diabetes s/p pancreatectomy and TPAIT in 2016,complicated by gastroparesis, as well as a comorbid history of HTN,HLD, Hypothyroidism, chronic nausea/and constipation, chronic malnutrition and TPN and Tube feeds dependent , who was admitted on 10/11/2024 for exploratory laparotomy, extensive lysis of adhesions, revision of J tube,and small bowel resection. Inpatient Diabetes Service consulted for management of diabetes in the setting of continuous TPN use and expected TF use in future. G and J tube on gravity          Assessment/Plan:     Assessment:   Pancreatogenic diabetes s/p pancreatectomy and TPAIT in 2016,went off insulin for about 4 years and now back on insulin , A1C-5.4% in 7/23/2024  S/p exploratory laparotomy, extensive lysis of adhesions, revision of J tube,and small bowel resection on 10/11/24  Chronic malnutrition, was on TPN X12 hour, 3 days/week and Tube feeds x 12 hours, 7 days/week at home, now tube feeds are off, TPN continuous  Poor oral intake  Continuous TPN related hyperglycemia    Plan/Recommendations:        Continue Lantus 3 units q 24 hrs for 24 hour insulin dosing.   - Continue Novolog Correction Scale: medium resistance (ISF: 1:50 )every 4 hours.  - BG monitoring: Every 4 hours.  - Hypoglycemia protocol    - Carb counting protocol when starts eating.     Discussion:  BG overnight=181-195.  Then BG at 4 pm and 8 pm are 137 and 134 so lower. Will not increase lantus today.  Will consider adding regular insulin to her TPN.  She said they did that before and cannot remember why not now.  Dextrose=140g.  She had a lemon ice and cranberyy juice and her WV=591 on her dexcom so will not add carb coverage. Last dietician note from 10/12/2024.      Discharge Planning: (tentative)  Medications: TBD  Test Claims: TBD  Education:  Needs to be assessed closer  "to discharge.   Outpatient Follow-up:  recommend [facility] Endocrinology vs PCP     Please notify Inpatient Diabetes Service if changes are planned to steroids, nutrition, TPN/TF and anticipated procedures requiring prolonged NPO status.         Interval History/Review of Systems :   The last 24 hours progress and nursing notes reviewed.  Doing well.  Has nausea but no emesis since admission.  Creat=0.52 with GFR>90  Planned Procedures/Surgeries: none    Inpatient Glucose Control:       Recent Labs   Lab 10/14/24  1428 10/14/24  0952 10/14/24  0734 10/14/24  0405 10/14/24  0040 10/13/24  2008   * 195* 297* 184* 181* 134*             Medications Impacting Glycemia:   Steroids: None  D5W containing solutions/medications: None  Other medications impacting glucose: NOne         Nutrition:   Orders Placed This Encounter      Clear Liquid Diet    Supplements: none   TF: none   TPN:  Continuous: Goal PN: Volume: 60 ml/hr, 1440 ml/day with Dextrose: 200 grams/day, AA:100 grams/day + SMOF 250 ml IV lipids x 7 days per week.  Current dextrose=140g        Diabetes History: see full consult note for complete diabetes history   DDiabetes Type and Duration: Since 2016, s/p pancreatectomy and TPAI     PTA Medication Regimen: Takes insulin levemir 3 units daily and novolog 1 unit if BG is 175-275, and 2 units if >275 up to every 4 hours as needed. Reports some times she keeps on  giving correction whole night but other times does it once or not require it all.  Historical Diabetes Medications: As above     Glucose monitoring device and frequency: Checks with jim 3  Outpatient Diabetes Provider: None      Physical Exam:   BP 92/58 (BP Location: Right arm)   Pulse 97   Temp 98.1  F (36.7  C) (Oral)   Resp 18   Ht 1.727 m (5' 8\")   Wt 61.3 kg (135 lb 3.2 oz)   SpO2 100%   BMI 20.56 kg/m    General:  in no acute distress, sitting in chair.  HEENT:  NC/AT. MMM, sclera not injected  Lungs:  unremarkable, no work of " breathing  ABD:  rounded, soft, non-tender  Skin:  warm and dry, no obvious lesions  MSK:   moving all extremities  Lymp:   no LE edema  Mental Status:  Alert and oriented x3  Psych:   Cooperative, good eye contact, full/appropriate affect           Data:     Lab Results   Component Value Date    A1C 5.4 07/23/2024    A1C 5.6 03/20/2024    A1C 5.4 02/17/2022    A1C 5.2 11/27/2021    A1C 5.5 09/18/2021    A1C 5.4 08/05/2021    A1C 5.7 05/12/2021    A1C 5.9 (A) 04/01/2021    A1C 5.5 12/17/2020    A1C 5.8 (H) 07/30/2020       ROUTINE IP LABS (Last four results)  BMP  Recent Labs   Lab 10/14/24  1428 10/14/24  0952 10/14/24  0734 10/14/24  0405 10/13/24  0757 10/13/24  0606 10/12/24  0747 10/12/24  0525 10/11/24  2255 10/11/24  2000   NA  --   --  134*  --   --  133*  --  135  --  137   POTASSIUM  --   --  3.8  --   --  4.1  --  4.6  --  3.9   CHLORIDE  --   --  97*  --   --  100  --  99  --  100   KASSI  --   --  8.7*  --   --  8.7*  --  8.4*  --  8.5*   CO2  --   --  29  --   --  28  --  26  --  26   BUN  --   --  10.6  --   --  11.3  --  12.2  --  13.4   CR  --   --  0.52  --   --  0.50*  --  0.61  --  0.65   * 195* 297* 184*   < > 186*   < > 243*   < > 204*    < > = values in this interval not displayed.     CBC  Recent Labs   Lab 10/14/24  0734 10/13/24  0606 10/12/24  0525   WBC 14.1* 14.8* 11.9*   RBC 2.97* 3.06* 3.56*   HGB 9.2* 9.3* 10.6*   HCT 28.6* 29.5* 33.8*   MCV 96 96 95   MCH 31.0 30.4 29.8   MCHC 32.2 31.5 31.4*   RDW 12.9 12.8 12.7    271 309     INR  Recent Labs   Lab 10/14/24  0734 10/13/24  0606 10/12/24  0525   INR 1.01 1.20* 1.24*       Inpatient Diabetes Service will continue to follow, please don't hesitate to contact the team with any questions or concerns.     Maria Elena Wallace PA-C    Plan discussed with patient, bedside RN, and primary team via this note.    To contact Inpatient Diabetes Service:     7 AM - 5 PM: Page the IDS GIGI following the patient that day (see filed or  incomplete progress notes/consult notes under Endocrinology)    OR if uncertain of provider assignment: page job code [phone]    5 PM - 7 AM: First call after hours is to primary service.    For urgent after-hours questions, page job code for on call fellow: [phone]     I spent a total of 35 minutes on the date of the encounter doing prep/post-work, chart review, history and exam, documentation and further activities per the note including lab review, multidisciplinary communication, counseling the patient and/or coordinating care regarding acute hyper/hypoglycemic management, as well as discharge management and planning/communication.

## 2024-10-15 LAB
ANION GAP SERPL CALCULATED.3IONS-SCNC: 8 MMOL/L (ref 7–15)
BUN SERPL-MCNC: 12.6 MG/DL (ref 6–20)
CALCIUM SERPL-MCNC: 8.4 MG/DL (ref 8.8–10.4)
CHLORIDE SERPL-SCNC: 97 MMOL/L (ref 98–107)
CREAT SERPL-MCNC: 0.51 MG/DL (ref 0.51–0.95)
EGFRCR SERPLBLD CKD-EPI 2021: >90 ML/MIN/1.73M2
ERYTHROCYTE [DISTWIDTH] IN BLOOD BY AUTOMATED COUNT: 13 % (ref 10–15)
GLUCOSE BLDC GLUCOMTR-MCNC: 111 MG/DL (ref 70–99)
GLUCOSE BLDC GLUCOMTR-MCNC: 136 MG/DL (ref 70–99)
GLUCOSE BLDC GLUCOMTR-MCNC: 144 MG/DL (ref 70–99)
GLUCOSE BLDC GLUCOMTR-MCNC: 154 MG/DL (ref 70–99)
GLUCOSE BLDC GLUCOMTR-MCNC: 157 MG/DL (ref 70–99)
GLUCOSE BLDC GLUCOMTR-MCNC: 157 MG/DL (ref 70–99)
GLUCOSE BLDC GLUCOMTR-MCNC: 176 MG/DL (ref 70–99)
GLUCOSE BLDC GLUCOMTR-MCNC: 214 MG/DL (ref 70–99)
GLUCOSE SERPL-MCNC: 180 MG/DL (ref 70–99)
HCO3 SERPL-SCNC: 31 MMOL/L (ref 22–29)
HCT VFR BLD AUTO: 27.6 % (ref 35–47)
HGB BLD-MCNC: 8.6 G/DL (ref 11.7–15.7)
MAGNESIUM SERPL-MCNC: 1.7 MG/DL (ref 1.7–2.3)
MCH RBC QN AUTO: 29.9 PG (ref 26.5–33)
MCHC RBC AUTO-ENTMCNC: 31.2 G/DL (ref 31.5–36.5)
MCV RBC AUTO: 96 FL (ref 78–100)
PHOSPHATE SERPL-MCNC: 3.4 MG/DL (ref 2.5–4.5)
PLATELET # BLD AUTO: 304 10E3/UL (ref 150–450)
POTASSIUM SERPL-SCNC: 3.7 MMOL/L (ref 3.4–5.3)
RBC # BLD AUTO: 2.88 10E6/UL (ref 3.8–5.2)
SODIUM SERPL-SCNC: 136 MMOL/L (ref 135–145)
WBC # BLD AUTO: 12.7 10E3/UL (ref 4–11)

## 2024-10-15 PROCEDURE — 250N000013 HC RX MED GY IP 250 OP 250 PS 637: Performed by: SURGERY

## 2024-10-15 PROCEDURE — G0463 HOSPITAL OUTPT CLINIC VISIT: HCPCS

## 2024-10-15 PROCEDURE — 250N000013 HC RX MED GY IP 250 OP 250 PS 637: Performed by: COLON & RECTAL SURGERY

## 2024-10-15 PROCEDURE — 250N000011 HC RX IP 250 OP 636

## 2024-10-15 PROCEDURE — 83735 ASSAY OF MAGNESIUM: CPT | Performed by: COLON & RECTAL SURGERY

## 2024-10-15 PROCEDURE — 250N000013 HC RX MED GY IP 250 OP 250 PS 637: Performed by: STUDENT IN AN ORGANIZED HEALTH CARE EDUCATION/TRAINING PROGRAM

## 2024-10-15 PROCEDURE — B4185 PARENTERAL SOL 10 GM LIPIDS: HCPCS | Performed by: COLON & RECTAL SURGERY

## 2024-10-15 PROCEDURE — 250N000009 HC RX 250: Performed by: COLON & RECTAL SURGERY

## 2024-10-15 PROCEDURE — 250N000011 HC RX IP 250 OP 636: Performed by: SURGERY

## 2024-10-15 PROCEDURE — 120N000002 HC R&B MED SURG/OB UMMC

## 2024-10-15 PROCEDURE — 84100 ASSAY OF PHOSPHORUS: CPT | Performed by: COLON & RECTAL SURGERY

## 2024-10-15 PROCEDURE — 250N000011 HC RX IP 250 OP 636: Performed by: STUDENT IN AN ORGANIZED HEALTH CARE EDUCATION/TRAINING PROGRAM

## 2024-10-15 PROCEDURE — 250N000013 HC RX MED GY IP 250 OP 250 PS 637

## 2024-10-15 PROCEDURE — 258N000003 HC RX IP 258 OP 636: Performed by: SURGERY

## 2024-10-15 PROCEDURE — 250N000009 HC RX 250

## 2024-10-15 PROCEDURE — 85014 HEMATOCRIT: CPT

## 2024-10-15 PROCEDURE — 99232 SBSQ HOSP IP/OBS MODERATE 35: CPT | Performed by: PHYSICIAN ASSISTANT

## 2024-10-15 PROCEDURE — 80048 BASIC METABOLIC PNL TOTAL CA: CPT

## 2024-10-15 PROCEDURE — 250N000009 HC RX 250: Performed by: SURGERY

## 2024-10-15 RX ORDER — OXYCODONE HCL 5 MG/5 ML
5 SOLUTION, ORAL ORAL EVERY 4 HOURS PRN
Status: DISCONTINUED | OUTPATIENT
Start: 2024-10-15 | End: 2024-10-16

## 2024-10-15 RX ORDER — SALIVA STIMULANT COMB. NO.3
2 SPRAY, NON-AEROSOL (ML) MUCOUS MEMBRANE
Status: DISCONTINUED | OUTPATIENT
Start: 2024-10-15 | End: 2024-10-28 | Stop reason: HOSPADM

## 2024-10-15 RX ADMIN — SMOFLIPID 250 ML: 6; 6; 5; 3 INJECTION, EMULSION INTRAVENOUS at 19:56

## 2024-10-15 RX ADMIN — Medication 50 MG: at 21:31

## 2024-10-15 RX ADMIN — HEPARIN SODIUM 5000 UNITS: 5000 INJECTION, SOLUTION INTRAVENOUS; SUBCUTANEOUS at 00:26

## 2024-10-15 RX ADMIN — METHOCARBAMOL 500 MG: 100 INJECTION INTRAMUSCULAR; INTRAVENOUS at 23:55

## 2024-10-15 RX ADMIN — PROMETHAZINE HYDROCHLORIDE 25 MG: 25 INJECTION, SOLUTION INTRAMUSCULAR; INTRAVENOUS at 20:34

## 2024-10-15 RX ADMIN — MAGNESIUM SULFATE HEPTAHYDRATE: 500 INJECTION, SOLUTION INTRAMUSCULAR; INTRAVENOUS at 19:57

## 2024-10-15 RX ADMIN — OXYCODONE HYDROCHLORIDE 5 MG: 5 SOLUTION ORAL at 06:31

## 2024-10-15 RX ADMIN — INSULIN ASPART 1 UNITS: 100 INJECTION, SOLUTION INTRAVENOUS; SUBCUTANEOUS at 03:17

## 2024-10-15 RX ADMIN — INSULIN ASPART 1 UNITS: 100 INJECTION, SOLUTION INTRAVENOUS; SUBCUTANEOUS at 09:21

## 2024-10-15 RX ADMIN — TAMSULOSIN HYDROCHLORIDE 0.4 MG: 0.4 CAPSULE ORAL at 09:04

## 2024-10-15 RX ADMIN — ACETAMINOPHEN 650 MG: 325 SOLUTION ORAL at 17:28

## 2024-10-15 RX ADMIN — INSULIN ASPART 2 UNITS: 100 INJECTION, SOLUTION INTRAVENOUS; SUBCUTANEOUS at 22:17

## 2024-10-15 RX ADMIN — HEPARIN, PORCINE (PF) 10 UNIT/ML INTRAVENOUS SYRINGE 5 ML: at 05:50

## 2024-10-15 RX ADMIN — PROMETHAZINE HYDROCHLORIDE 25 MG: 25 INJECTION, SOLUTION INTRAMUSCULAR; INTRAVENOUS at 08:46

## 2024-10-15 RX ADMIN — Medication 60 MG: at 20:26

## 2024-10-15 RX ADMIN — HEPARIN SODIUM 5000 UNITS: 5000 INJECTION, SOLUTION INTRAVENOUS; SUBCUTANEOUS at 08:46

## 2024-10-15 RX ADMIN — LIDOCAINE 4% 1 PATCH: 40 PATCH TOPICAL at 08:47

## 2024-10-15 RX ADMIN — METHOCARBAMOL 500 MG: 100 INJECTION INTRAMUSCULAR; INTRAVENOUS at 08:46

## 2024-10-15 RX ADMIN — PROMETHAZINE HYDROCHLORIDE 25 MG: 25 INJECTION, SOLUTION INTRAMUSCULAR; INTRAVENOUS at 14:51

## 2024-10-15 RX ADMIN — INSULIN GLARGINE 3 UNITS: 100 INJECTION, SOLUTION SUBCUTANEOUS at 17:29

## 2024-10-15 RX ADMIN — METHOCARBAMOL 500 MG: 100 INJECTION INTRAMUSCULAR; INTRAVENOUS at 00:25

## 2024-10-15 RX ADMIN — ACETAMINOPHEN 650 MG: 325 SOLUTION ORAL at 11:27

## 2024-10-15 RX ADMIN — OXYCODONE HYDROCHLORIDE 5 MG: 5 SOLUTION ORAL at 23:55

## 2024-10-15 RX ADMIN — PANTOPRAZOLE SODIUM 40 MG: 40 INJECTION, POWDER, FOR SOLUTION INTRAVENOUS at 08:46

## 2024-10-15 RX ADMIN — OXYCODONE HYDROCHLORIDE 5 MG: 5 SOLUTION ORAL at 17:46

## 2024-10-15 RX ADMIN — BACLOFEN 20 MG: 10 TABLET ORAL at 20:24

## 2024-10-15 RX ADMIN — INSULIN ASPART 1 UNITS: 100 INJECTION, SOLUTION INTRAVENOUS; SUBCUTANEOUS at 06:07

## 2024-10-15 RX ADMIN — OXYCODONE HYDROCHLORIDE 5 MG: 5 SOLUTION ORAL at 11:27

## 2024-10-15 RX ADMIN — ACETAMINOPHEN 650 MG: 325 SOLUTION ORAL at 08:45

## 2024-10-15 RX ADMIN — HEPARIN SODIUM 5000 UNITS: 5000 INJECTION, SOLUTION INTRAVENOUS; SUBCUTANEOUS at 23:56

## 2024-10-15 RX ADMIN — ACETAMINOPHEN 650 MG: 325 SOLUTION ORAL at 20:26

## 2024-10-15 RX ADMIN — HEPARIN SODIUM 5000 UNITS: 5000 INJECTION, SOLUTION INTRAVENOUS; SUBCUTANEOUS at 15:00

## 2024-10-15 RX ADMIN — METHOCARBAMOL 500 MG: 100 INJECTION INTRAMUSCULAR; INTRAVENOUS at 17:52

## 2024-10-15 RX ADMIN — LEVOTHYROXINE SODIUM 150 MCG: 75 TABLET ORAL at 08:46

## 2024-10-15 RX ADMIN — BACLOFEN 20 MG: 10 TABLET ORAL at 08:45

## 2024-10-15 RX ADMIN — BACLOFEN 20 MG: 10 TABLET ORAL at 14:45

## 2024-10-15 ASSESSMENT — ACTIVITIES OF DAILY LIVING (ADL)
ADLS_ACUITY_SCORE: 32
ADLS_ACUITY_SCORE: 36
ADLS_ACUITY_SCORE: 32
ADLS_ACUITY_SCORE: 36
ADLS_ACUITY_SCORE: 32
ADLS_ACUITY_SCORE: 32

## 2024-10-15 NOTE — PROGRESS NOTES
Inpatient Diabetes Management Service: Daily Progress Note     HPI: Dinora Mcghee is a 59 year old with pancreatogenic diabetes s/p pancreatectomy and TPAIT in 2016,complicated by gastroparesis, as well as a comorbid history of HTN,HLD, Hypothyroidism, chronic nausea/and constipation, chronic malnutrition and TPN and Tube feeds dependent , who was admitted on 10/11/2024 for exploratory laparotomy, extensive lysis of adhesions, revision of J tube,and small bowel resection. Inpatient Diabetes Service consulted for management of diabetes in the setting of continuous TPN use and expected TF use in future. G and J tube on gravity          Assessment/Plan:     Assessment:   Pancreatogenic diabetes s/p pancreatectomy and TPAIT in 2016,went off insulin for about 4 years and now back on insulin , A1C-5.4% in 7/23/2024  S/p exploratory laparotomy, extensive lysis of adhesions, revision of J tube,and small bowel resection on 10/11/24  Chronic malnutrition, was on TPN X12 hour, 3 days/week and Tube feeds x 12 hours, 7 days/week at home, now tube feeds are off, TPN continuous  Poor oral intake  Continuous TPN related hyperglycemia  Gastroparesis    Plan/Recommendations:        Continue Lantus 3 units q 24 hrs for 24 hour insulin dosing.   - Continue Novolog Correction Scale: medium resistance (ISF: 1:50 )every 4 hours.  - BG monitoring: Every 4 hours.  - Hypoglycemia protocol    - Carb counting protocol when starts eating.     Discussion:  Had 2 elevated BG yesterday after she drank cranberry juice and lemon ice.  Lab QK=593 and then finger stick=195 so added some carb coverage.  Dextrose in TPN increased from 140 g to 170 g last night with -181.  No increase in dextrose tonight per dietician and will leave TPN continuous  Start tube feeds today, trickle, 10 ml/hour today. No change in lantus today.  No need to add regular insulin to TPN.  Monitor with trickle feeds.      Discharge Planning:  (tentative)  Medications: TBD  Test Claims: TBD  Education:  Needs to be assessed closer to discharge.   Outpatient Follow-up:  recommend [facility] Endocrinology vs PCP     Please notify Inpatient Diabetes Service if changes are planned to steroids, nutrition, TPN/TF and anticipated procedures requiring prolonged NPO status.         Interval History/Review of Systems :   The last 24 hours progress and nursing notes reviewed.  Has nausea but no emesis since admission. Prior to admission having emesis daily.  Not really having stools yet, has ileostomy.  Start trickle feeds to maybe increase bowl function per pt.  Creat=0.51 with GFR>90  Planned Procedures/Surgeries: none    Inpatient Glucose Control:       Recent Labs   Lab 10/15/24  0603 10/15/24  0549 10/15/24  0253 10/14/24  2220 10/14/24  1814 10/14/24  1428   * 180* 154* 181* 128* 110*             Medications Impacting Glycemia:   Steroids: None  D5W containing solutions/medications: None  Other medications impacting glucose: NOne         Nutrition:   Orders Placed This Encounter      Clear Liquid Diet      Prior to admission: was on TPN on Sunday, Tuesday and Thursday nights.  Also on enteral feeds  Supplements: none   TF: 10/15/2024:  start [employer] Peptide 1.5 (plain) @ 10 mL/hr via J-tube.   TPN:  Continuous: Goal PN: Volume: 60 ml/hr, 1440 ml/day with Dextrose: 200 grams/day, AA:100 grams/day + SMOF 250 ml IV lipids x 7 days per week.  Current dextrose=170 g        Diabetes History: see full consult note for complete diabetes history   DDiabetes Type and Duration: Since 2016, s/p pancreatectomy and TPAI     PTA Medication Regimen: Takes insulin levemir 3 units daily and novolog 1 unit if BG is 175-275, and 2 units if >275 up to every 4 hours as needed. Reports some times she keeps on  giving correction whole night but other times does it once or not require it all.  Historical Diabetes Medications: As above     Glucose monitoring device and  "frequency: Checks with Axceler 3  Outpatient Diabetes Provider: None      Physical Exam:   /57 (BP Location: Right arm, Patient Position: Sitting)   Pulse 109   Temp 98.6  F (37  C) (Oral)   Resp 18   Ht 1.727 m (5' 8\")   Wt 61.3 kg (135 lb 3.2 oz)   SpO2 98%   BMI 20.56 kg/m    General:  in no acute distress, sitting in chair.  HEENT:  NC/AT. MMM, sclera not injected  Lungs:  unremarkable, no work of breathing  ABD:  rounded, soft, non-tender  Skin:  warm and dry, no obvious lesions  MSK:   moving all extremities  Lymp:   no LE edema  Mental Status:  Alert and oriented x3  Psych:   Cooperative, good eye contact, full/appropriate affect           Data:     Lab Results   Component Value Date    A1C 5.4 07/23/2024    A1C 5.6 03/20/2024    A1C 5.4 02/17/2022    A1C 5.2 11/27/2021    A1C 5.5 09/18/2021    A1C 5.4 08/05/2021    A1C 5.7 05/12/2021    A1C 5.9 (A) 04/01/2021    A1C 5.5 12/17/2020    A1C 5.8 (H) 07/30/2020       ROUTINE IP LABS (Last four results)  BMP  Recent Labs   Lab 10/15/24  0603 10/15/24  0549 10/15/24  0253 10/14/24  2220 10/14/24  0952 10/14/24  0734 10/13/24  0757 10/13/24  0606 10/12/24  0747 10/12/24  0525   NA  --  136  --   --   --  134*  --  133*  --  135   POTASSIUM  --  3.7  --   --   --  3.8  --  4.1  --  4.6   CHLORIDE  --  97*  --   --   --  97*  --  100  --  99   KASSI  --  8.4*  --   --   --  8.7*  --  8.7*  --  8.4*   CO2  --  31*  --   --   --  29  --  28  --  26   BUN  --  12.6  --   --   --  10.6  --  11.3  --  12.2   CR  --  0.51  --   --   --  0.52  --  0.50*  --  0.61   * 180* 154* 181*   < > 297*   < > 186*   < > 243*    < > = values in this interval not displayed.     CBC  Recent Labs   Lab 10/15/24  0549 10/14/24  0734 10/13/24  0606 10/12/24  0525   WBC 12.7* 14.1* 14.8* 11.9*   RBC 2.88* 2.97* 3.06* 3.56*   HGB 8.6* 9.2* 9.3* 10.6*   HCT 27.6* 28.6* 29.5* 33.8*   MCV 96 96 96 95   MCH 29.9 31.0 30.4 29.8   MCHC 31.2* 32.2 31.5 31.4*   RDW 13.0 12.9 12.8 12.7 "    270 271 309     INR  Recent Labs   Lab 10/14/24  0734 10/13/24  0606 10/12/24  0525   INR 1.01 1.20* 1.24*       Inpatient Diabetes Service will continue to follow, please don't hesitate to contact the team with any questions or concerns.     MORENO Barton  Inpatient Diabetes Service  10/15/2024    Plan discussed with patient, bedside RN, and primary team via this note.    To contact Inpatient Diabetes Service:     7 AM - 5 PM: Page the IDS GIGI following the patient that day (see filed or incomplete progress notes/consult notes under Endocrinology)    OR if uncertain of provider assignment: page job code [phone]    5 PM - 7 AM: First call after hours is to primary service.    For urgent after-hours questions, page job code for on call fellow: [phone]     I spent a total of 35 minutes on the date of the encounter doing prep/post-work, chart review, history and exam, documentation and further activities per the note including lab review, multidisciplinary communication, counseling the patient and/or coordinating care regarding acute hyper/hypoglycemic management, as well as discharge management and planning/communication.

## 2024-10-15 NOTE — PLAN OF CARE
"Plan of Care Reviewed With: patient    Overall Patient Progress: no change    Outcome Evaluation: POD#4. AO x4. TPN infusing. TF @10 ml/hr started in J tube. G tube open to gravity. PT/Nutrition/SW consulted. VSS.    Status: Picked up pt at 1100. Pt was up in chair doing well. PT/Nutrition/SW consulted. Pt has a DL port - CDI - TPN is running continuously at 60/hr. GJ tube present. G tube is open to gravity - meant for crushed meds. J tube - currently clamped - meant for suspension meds. TF to be started at 10 mL/hr in J tube. Tubing is in room, pump is ordered. AO x4. SBA. Piedra cath (pt reported some pressure feeling) - no BM ([id number]). Clear liq diet - kevin counts in place. VSS- intermittent tachycardia.     Labs: ACHS BG - with sliding scale. Mag/phos protocol - good, reordered for tomorrow. Reviewed.     Pain/Nausea: Pain rated as high as 8/10. Scheduled tylenol given. PRN oxy given. PCA pump in place - pt uses frequently. At times nausea is present - no complaints during 1725-9722 shift.     New Changes: Consults placed. TF to be started. Oxy given.     Plan: Start TF. Monitor pain. Encourage activity. Continue POC.     Vital signs:  Temp: 98.7  F (37.1  C) Temp src: Oral BP: 91/52 Pulse: 96   Resp: 18 SpO2: 96 % O2 Device: None (Room air) Oxygen Delivery: 2 LPM Height: 172.7 cm (5' 8\") Weight: 61.3 kg (135 lb 3.2 oz)  Estimated body mass index is 20.56 kg/m  as calculated from the following:    Height as of this encounter: 1.727 m (5' 8\").    Weight as of this encounter: 61.3 kg (135 lb 3.2 oz).       "

## 2024-10-15 NOTE — PLAN OF CARE
Plan of Care Reviewed With: patient    Overall Patient Progress: improving    Goal Outcome Evaluation ([id number]): Alert and oriented x4. Abdominal incision intact with staples. Ileostomy with minimal gas, small amount of pink watery output. Pt reports nausea is improving. G-tube open to gravity. On scheduled Phenergan. Abdominal pain 5/10 at rest, increases to 7/10 with activity. Pain somewhat managed with Dilaudid PCA, Tylenol, Baclofen, IV Robaxin, and lidocaine patches. J-tube clamped. TPN and lipids infusing via CVC. On clear liquid diet. BG q4H, sliding scale insulin given per MAR. Adequate urine output from rose catheter. Up with SBA. VSS on room air. Continuous pulse oximetry in use.

## 2024-10-15 NOTE — PROVIDER NOTIFICATION
Paged Shaun Hinton at [phone]. Pt has scopolamine patch in place behind right ear. Pt reports placing patch on at home PTA. Scopolamine patch not in MAR. Pt reports patch is due to be changed today. Paged to determine if MD wants to place order for scopolamine patch.     Per MD, remove scopolamine patch and assess if nausea worsens. If nausea increases, provider will order patch.

## 2024-10-15 NOTE — CONSULTS
Care Management Follow Up    Length of Stay (days): 4    Expected Discharge Date: 10/23/2024     Concerns to be Addressed: discharge planning     Patient plan of care discussed at interdisciplinary rounds: Yes    Anticipated Discharge Disposition: Home, Home Care, Home Infusion     Anticipated Discharge Services: Home Care  Anticipated Discharge DME: None (Resume: TF, TPN, G + J tube NEW: Ostomy)    Patient/family educated on Medicare website which has current facility and service quality ratings: no  Education Provided on the Discharge Plan:  yes  Patient/Family in Agreement with the Plan:  yes    Referrals Placed by CM/SW: Internal Clinic Care Coordination, Homecare  Private pay costs discussed: Not applicable    Discussed  Partnership in Safe Discharge Planning  document with patient/family: No     Handoff Completed: Yes, [facility] PCP: Internal handoff referral completed    Ambulatory care hand-in received from:   Simin GALO RN Care Manager   [facility]   Phone: [phone]   Fax: [phone]     Additional Information:  Per H &P: s/p TAC and ileorectal anastomosis, diverting loop ileostomy on 10/11 for slow transit constipation  -Plan is to discharge home with resumption of home care/home infusion  -Patient's  can provide transport at time of discharge    Per am rounds/chart review 10/15  plan to start trickle feed per J tube  Anticipate slow advancement of diet    RNCC met briefly with Dinora at the bedside - she reports improved pain management, ostomy is starting to produce gas and stool. She reports that MARJORIE is as early as Friday 10/18.     Current Services confirmed 10/14:  RNCC confirmed with CARLYN Benavides liaison, that patient is open to the following agencies. [facility] for her IV and enteral supplies, and [facility] for her nursing cares   Resumption of care orders placed for both home care and home infusion  Primary Care - Care Coordination Referral order placed per automatic  BPA    [facility] ([facility]) (RN cares for G tube, J tube, Ann)       Services Available   Home Health Services      Address   [address]  [place] [address]             Contact Information    [phone]         [facility] (TF and TPN)  [phone]  JIM [facility] liaison   [phone]  Teams     Next Steps:   Follow up in care rounds to confirm any changes in discharge needs  Update [facility] and [facility] as needed   Patient's  can provide transport home    Sweta Stoll RNCC  Care Management Department  Phone: [phone]  5A room 5323-6771  5C (non-BMT) room 2643-1681

## 2024-10-15 NOTE — PROGRESS NOTES
"[facility] Nurse Inpatient Assessment     Consulted for: Diverting loop ileostomy    Patient History (according to provider note(s):      \"58 y/o female has a past medical history of Allergic rhinitis, cause unspecified, Allergy to other foods, Arthritis, Choledocholithiasis, Chronic abdominal pain, Chronic constipation, Chronic nausea, Chronic pancreatitis (H), Degeneration of lumbar or lumbosacral intervertebral disc, Esophageal reflux, Gastroparesis, Hiatal hernia, History of pituitary adenoma, Hypothyroidism, Migraines, Mild hyperlipidemia, On tube feeding diet, Pancreatic disease, PONV (postoperative nausea and vomiting), Portacath in place, Type 1 diabetes mellitus without complication (H) (5/9/2022), and Unspecified hearing loss.\"     Assessment:      Assessment of new end Ileostomy:  Diagnosis Pertinent to Stoma:  Slow transit constipation       Surgery Date: 10/11/24  Surgeon:[facility]  Pouching system in place on assessment today: Leandro one piece, cut to fit, flat, and barrier ring   Pouch barrier status: intact  Pouch last changed/wear time: 1 day  Reason for pouch change today: ostomy education, routine schedule, and pouch not changed today  Effectiveness of current pouching/ supply plan: Effective  Change made with ostomy management today: No  Pouching system placed today:  not changed today     Supplies: supplies stored on unit and discussed with patient     Stoma location: RLQ  Stoma size: 1 1/4 inches  Stoma appearance: red, round, moist, edematous, and protruberant  Mucocutaneous junction:  not visualized (barrier in place)  Peristomal complication(s): not visualized   Output: watery and yellow  Output volume emptied during visit: 0 ml  Abdominal assessment: Distended  Surgical site(s): open to air and staples intact  NG still in place? No  Pain: Sharp  Is patient still on a PCA? Yes    Ostomy education " "assessment:  Participant of teaching session today: patient   Education completed today: Pouching system assessment , Intake and output recording, Importance of hydration, When to seek medical attention, Low fiber diet , Infection prevention/hygiene , Hernia prevention, and Lifestyle adjustments   Educational materials/methods: Verbal, Written, MARGARET Medrano education hand out, and Addressed patient/caregiver questions/concerns  Education still needed: Discharge instructions  Learning Comprehension:   Psychosocial assessment: is a retired  and has had to manage TPN, drains at home with home care assist   Patient readiness for education today: attentive and active participation  Following previous visit: patient  is able to demonstrate;         1. How to empty their pouch? Yes and with minimal assist        2. How to change their pouch? Yes and with minimal assist        3. How to read and record intake and output correctly? Yes and Demo provided   Preparation for discharge completed: No discharge preparation started yet  Preparation for discharge still needed: No discharge preparation started yet  Pt support system on discharge: spouse  WOC recommend home care? Yes  Face to face time: 60 minutes      Treatment Plan:     RLQ Ileostomy pouching plan:   Pouching system: ostomy supplies pouches: [place] 57 FECAL ([id number]) ostomy supplies barrier: [place] 57mm FLAT ([id number])  Accessories used: WOC ostomy accessories: 2\" Cera Barrier Ring ([id number])   Frequency of pouch changes: PRN leakage and Three times a week  WOC follow up plan: Daily Monday-Friday (as able)  Bedside RN interventions: Change pouch PRN if leaking using the supplies above, Empty pouch when 1/3 to 1/2 full, ensure to clean pouch outlet after emptying to prevent odor, Notify WOC for ongoing pouch leakage, Document stoma appearance and output volume, color, and consistency every shift, and Encourage patient to empty pouch with assist     DATA: "     Current support surface: Standard  Standard gel mattress (Isoflex)  Containment of urine/stool: Ileostomy pouch  BMI: Body mass index is 20.56 kg/m .   Active diet order: Orders Placed This Encounter      Clear Liquid Diet     Output: I/O last 3 completed shifts:  In: 3390.06 [P.O.:680; I.V.:164; Other:55; NG/GT:531.3]  Out: 3460 [Urine:2125; Emesis/NG output:1170; Stool:165]     Labs:   Recent Labs   Lab 10/15/24  0549 10/14/24  0734 10/13/24  0606   ALBUMIN  --  3.1* 3.2*   PREALB  --   --  8.1*   HGB 8.6* 9.2* 9.3*   INR  --  1.01 1.20*   WBC 12.7* 14.1* 14.8*     Pressure injury risk assessment:   Sensory Perception: 4-->no impairment  Moisture: 4-->rarely moist  Activity: 3-->walks occasionally  Mobility: 3-->slightly limited  Nutrition: 3-->adequate  Friction and Shear: 3-->no apparent problem  Martinez Score: 20

## 2024-10-15 NOTE — PROVIDER NOTIFICATION
Amcom to Santiago Zaldivar at 1557 summary asking to update the PCA dilaudid order. The 15 minute lockout does not equal the 1.2 hourly total. Quyen Bear RN

## 2024-10-15 NOTE — PLAN OF CARE
Goal Outcome Evaluation:    Neuro: A&Ox4.   Cardiac: SR. VSS.   Respiratory: Sating 98% on RA.  GI/: Adequate urine output. BM X1  Diet/appetite: Tolerating regular diet. Eating well.  Activity: stand by assist up to chair and in halls.  Pain: At acceptable level on current regimen.   Skin: No new deficits noted.  LDA's: PIV, PICC , GJ tube and rose are patent and functioning.   Plan: Continue with POC. Notify primary team with changes.

## 2024-10-15 NOTE — PROGRESS NOTES
CLINICAL NUTRITION SERVICES - BRIEF NOTE     Nutrition Prescription    Recommendations already ordered by Registered Dietitian (RD):  Start trickle TF: Amy Glass Peptide 1.5 (plain) @ 10 mL/hr via J-tube.  30 mL q4h free water flushes for FT patency    RELIZORB CARTRIDGES (pt uses PTA, pt with hx TPAIT)  *Change 1 cartridge every 24 hours with TF rate @ 10-20 ml/hr  *Change 1 cartridge every 12 hours with TF rate >20 ml/hr  *Supplies: Obtain cartridges in the unit med room or call [phone] and provide peoplesoft #[id number]     No further TPN dextrose advancement, keep dextrose at 170 g/day (continuous TPN) for now    Discussed above TPN and TF plan with Endo       *Received consult: Registered Dietitian to Assess and Order TF.  Per primary team, plan to start trickle TF today  *See RD note on 10/12 for nutrition assessment note    EVALUATION OF THE PROGRESS TOWARD GOALS   Diet: Clear Liquid    Nutrition Support: TPN, 1440 mL/day with 170 g dextrose, 100 g AA, and 250 mL 20% IV SMOFlipid daily to provide 1478 kcal (23 kcal/kg), 1.6 g/kg PRO, GIR 1.9, and 34% kcal from fat per dosing weight    NEW FINDINGS   Meds:  - Novolog, 1 unit per 20 g CHO TID with breakfast, lunch, and dinner  - Novolog, medium sliding scale insulin q4h  - Lantus, 3 units daily @ 1600    Labs:  - K+, Mg++, Phos WNL  - BGs stable (Endo following)     INTERVENTIONS  See above    Monitoring/Evaluation  Progress toward goals will be monitored and evaluated per protocol.      Magi Estrella RD, , LD  Weekday Units covered: 5A (non-Heme Onc pts) and 7B ([id number])  Available by 5A or 7B Clinical Dietitifernanda Olivo  Weekend Coverage: Weekend Clinical Dietitian Pallavi    Clinical Dietitians no longer available via paging

## 2024-10-15 NOTE — PLAN OF CARE
Goal Outcome Evaluation:      Plan of Care Reviewed With: patient    Overall Patient Progress: improvingOverall Patient Progress: improving         Nursing note    Pain/Comfort: Patient has 8/10 - 4/10 constant pain of abdomen upon assessments. Pain was managed with PCA pain pump.    Primary problem: Constipation  Assessments/Progress: Patient has PCA pump infusing with TKO NS to L. PIV for pain management. Room air, VSS, except tachycardic HR ~115bpm. Patient is A&Ox4, pleasant and cooperative. Bowel sounds are audible but hypoacive, small amount of bowel sweat present in ileostomy this morning. Patient and nurse reviewed ileostomy cares and answered patient's questions. The patient's midline incision is open to air, no drainage. Infusing TPN and lipids overnight at ordered rates into CVC. AM labs drawn from CVC, blood return present, and line heparin flushed.     Priority nursing care for next shift: Continue POC  Discharge plan/ barriers to discharge: pending

## 2024-10-16 ENCOUNTER — APPOINTMENT (OUTPATIENT)
Dept: PHYSICAL THERAPY | Facility: CLINIC | Age: 59
End: 2024-10-16
Payer: COMMERCIAL

## 2024-10-16 LAB
ANION GAP SERPL CALCULATED.3IONS-SCNC: 9 MMOL/L (ref 7–15)
BUN SERPL-MCNC: 12.6 MG/DL (ref 6–20)
CALCIUM SERPL-MCNC: 8.9 MG/DL (ref 8.8–10.4)
CHLORIDE SERPL-SCNC: 99 MMOL/L (ref 98–107)
CREAT SERPL-MCNC: 0.53 MG/DL (ref 0.51–0.95)
EGFRCR SERPLBLD CKD-EPI 2021: >90 ML/MIN/1.73M2
ERYTHROCYTE [DISTWIDTH] IN BLOOD BY AUTOMATED COUNT: 12.9 % (ref 10–15)
GLUCOSE BLDC GLUCOMTR-MCNC: 140 MG/DL (ref 70–99)
GLUCOSE BLDC GLUCOMTR-MCNC: 155 MG/DL (ref 70–99)
GLUCOSE BLDC GLUCOMTR-MCNC: 160 MG/DL (ref 70–99)
GLUCOSE BLDC GLUCOMTR-MCNC: 180 MG/DL (ref 70–99)
GLUCOSE BLDC GLUCOMTR-MCNC: 181 MG/DL (ref 70–99)
GLUCOSE BLDC GLUCOMTR-MCNC: 190 MG/DL (ref 70–99)
GLUCOSE BLDC GLUCOMTR-MCNC: 190 MG/DL (ref 70–99)
GLUCOSE SERPL-MCNC: 214 MG/DL (ref 70–99)
HCO3 SERPL-SCNC: 29 MMOL/L (ref 22–29)
HCT VFR BLD AUTO: 26.3 % (ref 35–47)
HGB BLD-MCNC: 8.4 G/DL (ref 11.7–15.7)
MAGNESIUM SERPL-MCNC: 1.8 MG/DL (ref 1.7–2.3)
MCH RBC QN AUTO: 30.3 PG (ref 26.5–33)
MCHC RBC AUTO-ENTMCNC: 31.9 G/DL (ref 31.5–36.5)
MCV RBC AUTO: 95 FL (ref 78–100)
PATH REPORT.COMMENTS IMP SPEC: NORMAL
PATH REPORT.COMMENTS IMP SPEC: NORMAL
PATH REPORT.FINAL DX SPEC: NORMAL
PATH REPORT.GROSS SPEC: NORMAL
PATH REPORT.MICROSCOPIC SPEC OTHER STN: NORMAL
PATH REPORT.RELEVANT HX SPEC: NORMAL
PHOSPHATE SERPL-MCNC: 3 MG/DL (ref 2.5–4.5)
PHOTO IMAGE: NORMAL
PLATELET # BLD AUTO: 329 10E3/UL (ref 150–450)
POTASSIUM SERPL-SCNC: 3.7 MMOL/L (ref 3.4–5.3)
RBC # BLD AUTO: 2.77 10E6/UL (ref 3.8–5.2)
SODIUM SERPL-SCNC: 137 MMOL/L (ref 135–145)
WBC # BLD AUTO: 16.2 10E3/UL (ref 4–11)

## 2024-10-16 PROCEDURE — 250N000011 HC RX IP 250 OP 636

## 2024-10-16 PROCEDURE — 85014 HEMATOCRIT: CPT | Performed by: STUDENT IN AN ORGANIZED HEALTH CARE EDUCATION/TRAINING PROGRAM

## 2024-10-16 PROCEDURE — 84100 ASSAY OF PHOSPHORUS: CPT | Performed by: COLON & RECTAL SURGERY

## 2024-10-16 PROCEDURE — 80048 BASIC METABOLIC PNL TOTAL CA: CPT | Performed by: COLON & RECTAL SURGERY

## 2024-10-16 PROCEDURE — B4185 PARENTERAL SOL 10 GM LIPIDS: HCPCS | Performed by: COLON & RECTAL SURGERY

## 2024-10-16 PROCEDURE — 99231 SBSQ HOSP IP/OBS SF/LOW 25: CPT | Performed by: STUDENT IN AN ORGANIZED HEALTH CARE EDUCATION/TRAINING PROGRAM

## 2024-10-16 PROCEDURE — 250N000013 HC RX MED GY IP 250 OP 250 PS 637: Performed by: SURGERY

## 2024-10-16 PROCEDURE — 250N000013 HC RX MED GY IP 250 OP 250 PS 637: Performed by: COLON & RECTAL SURGERY

## 2024-10-16 PROCEDURE — 250N000011 HC RX IP 250 OP 636: Performed by: SURGERY

## 2024-10-16 PROCEDURE — 250N000009 HC RX 250

## 2024-10-16 PROCEDURE — 120N000002 HC R&B MED SURG/OB UMMC

## 2024-10-16 PROCEDURE — 250N000011 HC RX IP 250 OP 636: Mod: JW | Performed by: STUDENT IN AN ORGANIZED HEALTH CARE EDUCATION/TRAINING PROGRAM

## 2024-10-16 PROCEDURE — 97530 THERAPEUTIC ACTIVITIES: CPT | Mod: GP

## 2024-10-16 PROCEDURE — 250N000009 HC RX 250: Performed by: COLON & RECTAL SURGERY

## 2024-10-16 PROCEDURE — 250N000009 HC RX 250: Performed by: SURGERY

## 2024-10-16 PROCEDURE — 250N000013 HC RX MED GY IP 250 OP 250 PS 637: Performed by: STUDENT IN AN ORGANIZED HEALTH CARE EDUCATION/TRAINING PROGRAM

## 2024-10-16 PROCEDURE — 97161 PT EVAL LOW COMPLEX 20 MIN: CPT | Mod: GP

## 2024-10-16 PROCEDURE — 250N000013 HC RX MED GY IP 250 OP 250 PS 637

## 2024-10-16 PROCEDURE — 250N000012 HC RX MED GY IP 250 OP 636 PS 637: Performed by: STUDENT IN AN ORGANIZED HEALTH CARE EDUCATION/TRAINING PROGRAM

## 2024-10-16 PROCEDURE — 97116 GAIT TRAINING THERAPY: CPT | Mod: GP

## 2024-10-16 PROCEDURE — 83735 ASSAY OF MAGNESIUM: CPT | Performed by: COLON & RECTAL SURGERY

## 2024-10-16 PROCEDURE — G0463 HOSPITAL OUTPT CLINIC VISIT: HCPCS

## 2024-10-16 PROCEDURE — 258N000003 HC RX IP 258 OP 636: Performed by: SURGERY

## 2024-10-16 RX ORDER — ENOXAPARIN SODIUM 100 MG/ML
40 INJECTION SUBCUTANEOUS EVERY 24 HOURS
Status: DISCONTINUED | OUTPATIENT
Start: 2024-10-16 | End: 2024-10-28 | Stop reason: HOSPADM

## 2024-10-16 RX ORDER — KETOROLAC TROMETHAMINE 30 MG/ML
30 INJECTION, SOLUTION INTRAMUSCULAR; INTRAVENOUS EVERY 6 HOURS PRN
Status: DISPENSED | OUTPATIENT
Start: 2024-10-16 | End: 2024-10-21

## 2024-10-16 RX ORDER — LIDOCAINE 4 G/G
2 PATCH TOPICAL
Status: DISCONTINUED | OUTPATIENT
Start: 2024-10-16 | End: 2024-10-18

## 2024-10-16 RX ORDER — HYDROMORPHONE HYDROCHLORIDE 1 MG/ML
0.5 INJECTION, SOLUTION INTRAMUSCULAR; INTRAVENOUS; SUBCUTANEOUS
Status: DISCONTINUED | OUTPATIENT
Start: 2024-10-16 | End: 2024-10-17

## 2024-10-16 RX ORDER — OXYCODONE HCL 5 MG/5 ML
5-10 SOLUTION, ORAL ORAL
Status: DISCONTINUED | OUTPATIENT
Start: 2024-10-16 | End: 2024-10-17

## 2024-10-16 RX ORDER — HYDROMORPHONE HYDROCHLORIDE 1 MG/ML
0.3 INJECTION, SOLUTION INTRAMUSCULAR; INTRAVENOUS; SUBCUTANEOUS
Status: DISCONTINUED | OUTPATIENT
Start: 2024-10-16 | End: 2024-10-16

## 2024-10-16 RX ADMIN — OXYCODONE HYDROCHLORIDE 10 MG: 5 SOLUTION ORAL at 14:28

## 2024-10-16 RX ADMIN — Medication 60 MG: at 20:28

## 2024-10-16 RX ADMIN — LEVOTHYROXINE SODIUM 150 MCG: 75 TABLET ORAL at 08:50

## 2024-10-16 RX ADMIN — ACETAMINOPHEN 650 MG: 325 SOLUTION ORAL at 14:28

## 2024-10-16 RX ADMIN — HYDROMORPHONE HYDROCHLORIDE 0.5 MG: 1 INJECTION, SOLUTION INTRAMUSCULAR; INTRAVENOUS; SUBCUTANEOUS at 18:28

## 2024-10-16 RX ADMIN — INSULIN ASPART 2 UNITS: 100 INJECTION, SOLUTION INTRAVENOUS; SUBCUTANEOUS at 06:25

## 2024-10-16 RX ADMIN — BACLOFEN 20 MG: 10 TABLET ORAL at 14:28

## 2024-10-16 RX ADMIN — INSULIN ASPART 1 UNITS: 100 INJECTION, SOLUTION INTRAVENOUS; SUBCUTANEOUS at 18:29

## 2024-10-16 RX ADMIN — TAMSULOSIN HYDROCHLORIDE 0.4 MG: 0.4 CAPSULE ORAL at 08:51

## 2024-10-16 RX ADMIN — LIDOCAINE 4% 2 PATCH: 40 PATCH TOPICAL at 11:08

## 2024-10-16 RX ADMIN — HYDROMORPHONE HYDROCHLORIDE 0.3 MG: 1 INJECTION, SOLUTION INTRAMUSCULAR; INTRAVENOUS; SUBCUTANEOUS at 13:14

## 2024-10-16 RX ADMIN — INSULIN ASPART 1 UNITS: 100 INJECTION, SOLUTION INTRAVENOUS; SUBCUTANEOUS at 02:21

## 2024-10-16 RX ADMIN — PROMETHAZINE HYDROCHLORIDE 25 MG: 25 INJECTION, SOLUTION INTRAMUSCULAR; INTRAVENOUS at 08:53

## 2024-10-16 RX ADMIN — OXYCODONE HYDROCHLORIDE 5 MG: 5 SOLUTION ORAL at 03:08

## 2024-10-16 RX ADMIN — MAGNESIUM SULFATE HEPTAHYDRATE: 500 INJECTION, SOLUTION INTRAMUSCULAR; INTRAVENOUS at 20:29

## 2024-10-16 RX ADMIN — INSULIN ASPART 1 UNITS: 100 INJECTION, SOLUTION INTRAVENOUS; SUBCUTANEOUS at 14:48

## 2024-10-16 RX ADMIN — OXYCODONE HYDROCHLORIDE 10 MG: 5 SOLUTION ORAL at 11:05

## 2024-10-16 RX ADMIN — ACETAMINOPHEN 650 MG: 325 SOLUTION ORAL at 08:51

## 2024-10-16 RX ADMIN — PROMETHAZINE HYDROCHLORIDE 25 MG: 25 INJECTION, SOLUTION INTRAMUSCULAR; INTRAVENOUS at 20:26

## 2024-10-16 RX ADMIN — ACETAMINOPHEN 650 MG: 325 SOLUTION ORAL at 22:04

## 2024-10-16 RX ADMIN — BACLOFEN 20 MG: 10 TABLET ORAL at 08:50

## 2024-10-16 RX ADMIN — INSULIN GLARGINE 3 UNITS: 100 INJECTION, SOLUTION SUBCUTANEOUS at 16:36

## 2024-10-16 RX ADMIN — METHOCARBAMOL 500 MG: 100 INJECTION INTRAMUSCULAR; INTRAVENOUS at 16:36

## 2024-10-16 RX ADMIN — HYDROMORPHONE HYDROCHLORIDE 0.5 MG: 1 INJECTION, SOLUTION INTRAMUSCULAR; INTRAVENOUS; SUBCUTANEOUS at 22:01

## 2024-10-16 RX ADMIN — ENOXAPARIN SODIUM 40 MG: 40 INJECTION SUBCUTANEOUS at 11:19

## 2024-10-16 RX ADMIN — SMOFLIPID 250 ML: 6; 6; 5; 3 INJECTION, EMULSION INTRAVENOUS at 20:29

## 2024-10-16 RX ADMIN — PROMETHAZINE HYDROCHLORIDE 25 MG: 25 INJECTION, SOLUTION INTRAMUSCULAR; INTRAVENOUS at 14:31

## 2024-10-16 RX ADMIN — Medication 50 MG: at 22:04

## 2024-10-16 RX ADMIN — OXYCODONE HYDROCHLORIDE 10 MG: 5 SOLUTION ORAL at 06:17

## 2024-10-16 RX ADMIN — BACLOFEN 20 MG: 10 TABLET ORAL at 20:28

## 2024-10-16 RX ADMIN — INSULIN ASPART 2 UNITS: 100 INJECTION, SOLUTION INTRAVENOUS; SUBCUTANEOUS at 22:05

## 2024-10-16 RX ADMIN — HYDROMORPHONE HYDROCHLORIDE 0.3 MG: 1 INJECTION, SOLUTION INTRAMUSCULAR; INTRAVENOUS; SUBCUTANEOUS at 08:40

## 2024-10-16 RX ADMIN — PROCHLORPERAZINE EDISYLATE 10 MG: 5 INJECTION INTRAMUSCULAR; INTRAVENOUS at 22:21

## 2024-10-16 RX ADMIN — DOXAZOSIN 1 MG: 4 TABLET ORAL at 11:12

## 2024-10-16 RX ADMIN — METHOCARBAMOL 500 MG: 100 INJECTION INTRAMUSCULAR; INTRAVENOUS at 08:43

## 2024-10-16 RX ADMIN — PANTOPRAZOLE SODIUM 40 MG: 40 INJECTION, POWDER, FOR SOLUTION INTRAVENOUS at 08:43

## 2024-10-16 ASSESSMENT — ACTIVITIES OF DAILY LIVING (ADL)
ADLS_ACUITY_SCORE: 31
ADLS_ACUITY_SCORE: 35
ADLS_ACUITY_SCORE: 35
ADLS_ACUITY_SCORE: 32
ADLS_ACUITY_SCORE: 35
ADLS_ACUITY_SCORE: 32
ADLS_ACUITY_SCORE: 35
ADLS_ACUITY_SCORE: 32
ADLS_ACUITY_SCORE: 35
ADLS_ACUITY_SCORE: 35
ADLS_ACUITY_SCORE: 32
ADLS_ACUITY_SCORE: 35
ADLS_ACUITY_SCORE: 32
ADLS_ACUITY_SCORE: 35
ADLS_ACUITY_SCORE: 32

## 2024-10-16 NOTE — PLAN OF CARE
"Time 6921-2619    /63 (BP Location: Right arm)   Pulse 97   Temp 98.2  F (36.8  C) (Oral)   Resp 18   Ht 1.727 m (5' 8\")   Wt 61.3 kg (135 lb 3.2 oz)   SpO2 100%   BMI 20.56 kg/m      A&O x4. AVSS during shift. DL CVC purple continuous TPN, running lipids, red TKO. G tube open to gravity. J tube running tube feeds started at 10 ml/hr. Piedra in place with adequate output. PIV running PCA dilaudid. Pain managed with PCA and PRN meds. Ileostomy with minimal gas and pink output. SBA.    Goal Outcome Evaluation:      Plan of Care Reviewed With: patient    Overall Patient Progress: no changeOverall Patient Progress: no change    Outcome Evaluation: POD#4      "

## 2024-10-16 NOTE — PROGRESS NOTES
Inpatient Diabetes Management Service: Daily Progress Note     HPI: Dinora Mcghee is a 59 year old with pancreatogenic diabetes s/p pancreatectomy and TPAIT in 2016,complicated by gastroparesis, as well as a comorbid history of HTN,HLD, Hypothyroidism, chronic nausea/and constipation, chronic malnutrition and TPN and Tube feeds dependent , who was admitted on 10/11/2024 for exploratory laparotomy, extensive lysis of adhesions, revision of J tube,and small bowel resection. Inpatient Diabetes Service consulted for management of diabetes in the setting of continuous TPN use and expected TF use in future. G and J tube on gravity          Assessment/Plan:     Assessment:   Pancreatogenic diabetes s/p pancreatectomy and TPAIT in 2016,went off insulin for about 4 years and now back on insulin , A1C-5.4% in 7/23/2024  S/p exploratory laparotomy, extensive lysis of adhesions, revision of J tube,and small bowel resection on 10/11/24  Chronic malnutrition, was on TPN X12 hour, 3 days/week and Tube feeds x 12 hours, 7 days/week at home, now tube feeds are off, TPN continuous  Poor oral intake  Continuous TPN related hyperglycemia  Gastroparesis    Plan/Recommendations:   - Lantus 3 units q 24 hrs for 24 hour at 1300.  - Novolog carb coverage: 1 unit per 20 g cho TID AC and prn snacks/supplements  - Novolog Correction Scale: medium resistance (ISF: 1:50) every 4 hours.  - BG monitoring: Every 4 hours.  - Hypoglycemia protocol    - Carb counting protocol    Discussion: Patient receiving continuous TPN without regular insulin added, currently 170 g Dextrose since 8PM 10/14 (previously 140 g dextrose). Also has clear liquid diet. Started on trickle TF yesterday, did not tolerate TF - discontinued ~6AM. . BG intermittently trended higher overnight while TF running. Spoke with RD, TPN dextrose increasing to 200 g tonight. Will continue current diabetes regimen.     Discharge Planning:  (tentative)  Medications: TBD  Test Claims: TBD  Education:  Needs to be assessed closer to discharge.   Outpatient Follow-up:  recommend [facility] Endocrinology vs PCP     Please notify Inpatient Diabetes Service if changes are planned to steroids, nutrition, TPN/TF and anticipated procedures requiring prolonged NPO status.         Interval History/Review of Systems :   - The last 24 hours progress and nursing notes reviewed.  - Patient feeling very emotional and tearful today due to abdominal pain. Reports severe abdominal pain overnight with trickle TF. Has tried a few bites of jello, popsicle, and coffee but all worsen her abdominal pain. Some nausea, no emesis.   - Per patient, will not retry TF for at least a few days.       Planned Procedures/Surgeries: none    Inpatient Glucose Control:       Recent Labs   Lab 10/16/24  0624 10/16/24  0148 10/16/24  0016 10/15/24  2210 10/15/24  1718 10/15/24  1458   * 155* 140* 214* 111* 136*             Medications Impacting Glycemia:   Steroids: None  D5W containing solutions/medications: None  Other medications impacting glucose: NOne         Nutrition:   Orders Placed This Encounter      Clear Liquid Diet  Prior to admission: was on TPN on Sunday, Tuesday and Thursday nights.  Also on enteral feeds  Supplements: none   TF: none. Was on 10/15/2024:  Amy Glass Peptide 1.5 (plain) @ 10 mL/hr via J-tube (only ran PM 10/15- 6AM 10/16).   TPN:  Continuous: Current dextrose=170 g --> increasing to 200 g tonight.        Diabetes History: see full consult note for complete diabetes history   DDiabetes Type and Duration: Since 2016, s/p pancreatectomy and TPAI     PTA Medication Regimen: Takes insulin levemir 3 units daily and novolog 1 unit if BG is 175-275, and 2 units if >275 up to every 4 hours as needed. Reports some times she keeps on  giving correction whole night but other times does it once or not require it all.  Historical Diabetes Medications: As above    "  Glucose monitoring device and frequency: Checks with jim 3  Outpatient Diabetes Provider: None      Physical Exam:   /61 (BP Location: Right arm)   Pulse 110   Temp 99.2  F (37.3  C) (Oral)   Resp 18   Ht 1.727 m (5' 8\")   Wt 61.3 kg (135 lb 3.2 oz)   SpO2 93%   BMI 20.56 kg/m     General:  well appearing, NAD, resting comfortably in chair.  Lungs: unlabored breathing on RA.  Mental Status:  Alert and oriented x3  Psych:   tearful            Data:     Lab Results   Component Value Date    A1C 5.4 07/23/2024    A1C 5.6 03/20/2024    A1C 5.4 02/17/2022    A1C 5.2 11/27/2021    A1C 5.5 09/18/2021    A1C 5.4 08/05/2021    A1C 5.7 05/12/2021    A1C 5.9 (A) 04/01/2021    A1C 5.5 12/17/2020    A1C 5.8 (H) 07/30/2020       ROUTINE IP LABS (Last four results)  BMP  Recent Labs   Lab 10/16/24  0624 10/16/24  0148 10/16/24  0016 10/15/24  2210 10/15/24  0603 10/15/24  0549 10/14/24  0952 10/14/24  0734 10/13/24  0757 10/13/24  0606 10/12/24  0747 10/12/24  0525   NA  --   --   --   --   --  136  --  134*  --  133*  --  135   POTASSIUM  --   --   --   --   --  3.7  --  3.8  --  4.1  --  4.6   CHLORIDE  --   --   --   --   --  97*  --  97*  --  100  --  99   KASSI  --   --   --   --   --  8.4*  --  8.7*  --  8.7*  --  8.4*   CO2  --   --   --   --   --  31*  --  29  --  28  --  26   BUN  --   --   --   --   --  12.6  --  10.6  --  11.3  --  12.2   CR  --   --   --   --   --  0.51  --  0.52  --  0.50*  --  0.61   * 155* 140* 214*   < > 180*   < > 297*   < > 186*   < > 243*    < > = values in this interval not displayed.     CBC  Recent Labs   Lab 10/15/24  0549 10/14/24  0734 10/13/24  0606 10/12/24  0525   WBC 12.7* 14.1* 14.8* 11.9*   RBC 2.88* 2.97* 3.06* 3.56*   HGB 8.6* 9.2* 9.3* 10.6*   HCT 27.6* 28.6* 29.5* 33.8*   MCV 96 96 96 95   MCH 29.9 31.0 30.4 29.8   MCHC 31.2* 32.2 31.5 31.4*   RDW 13.0 12.9 12.8 12.7    270 271 309     Inpatient Diabetes Service will continue to follow, please don't " hesitate to contact the team with any questions or concerns.     Homa Valencia PA-C  Inpatient Diabetes Service  Pager: [phone]  Available on vocera    Plan discussed with patient, bedside RN, and primary team via this note.    To contact Inpatient Diabetes Service:     7 AM - 5 PM: Page the IDS GIGI following the patient that day (see filed or incomplete progress notes/consult notes under Endocrinology)    OR if uncertain of provider assignment: page job code [phone]    5 PM - 7 AM: First call after hours is to primary service.    For urgent after-hours questions, page job code for on call fellow: [phone]     I spent a total of 30 minutes on the date of the encounter doing prep/post-work, chart review, history and exam, documentation and further activities per the note including lab review, multidisciplinary communication, counseling the patient and/or coordinating care regarding acute hyper/hypoglycemic management, as well as discharge management and planning/communication.

## 2024-10-16 NOTE — PROVIDER NOTIFICATION
Provider is being related to pt would like her feeding tube paused and she would like an extra dose of pain med.    Provider response is : Let me take a look

## 2024-10-16 NOTE — PROVIDER NOTIFICATION
Provider is being made aware that pt is requesting an extra dose pain med. Her oxy is not due until 0355. Pt has abd pain that spreads to her back and intensifies when she lays on her side.    Provider response pt is already on PCA dilaudid.

## 2024-10-16 NOTE — PLAN OF CARE
Pt is alert and oriented x4, AVSS, afebrile on RA,  Pt denies nausea, vomiting but c/o of abd pain that extend to her back and intensify when she lays on her side. Provider contacted and no response. Prn oxycodone given x3. PCA pump in place and deemed ineffective per pt statement  continuous rate 0 mg/hr, PCA dose: 0.2, PCA lockout 15 minutes. One hour limit is 0.8 mg. clinician bolus (one time dose): 0.2 mg. Tpn infusing at 60 ml/hr, lipids 20.8 and rube feed 10ml/hr with 30ml water flush q4hr. Ileostomy had 550 ml overnight. The outpout appeared watery with chunks in it. BS overnight was 155 and 190. 1 and 2 units of insulin given respectively. Up with     PCA dilaudid had been discontinued this morning and IV dilaudid q2hrs had been ordered. The plan is to discontinue the J-tube.

## 2024-10-16 NOTE — PROVIDER NOTIFICATION
5A 5224 EO  Pt was understanding her tube feeding would be held but there are no orders. Can see have two lidocaine patches instead of one? The heparin shots are hard on pt. Wondering if she can switch to lovenox?     Darcy Layton  [phone]    Outcome: patient switched to Lovenox and tube feeding was discontinued.

## 2024-10-16 NOTE — PROGRESS NOTES
CLINICAL NUTRITION SERVICES - BRIEF NOTE     Nutrition Prescription    Recommendations already ordered by Registered Dietitian (RD):  Increase dextrose to goal 200 g/day, discussed with Endo and pharmacist    Goal TPN: 1440 mL/day (60 mL/hr continuous) with 200 g dextrose, 100 g AA, and 250 mL 20% IV SMOFlipid daily to provide 1580 kcal (25 kcal/kg), 1.6 g/kg PRO, GIR 2.2, and 32% kcal from fat per dosing weight     Discontinue free/water flush and relizorb orders (team discontinued TF orders this morning)     *See RD notes on 10/12 and 10/15 for previous nutrition notes this admit     EVALUATION OF THE PROGRESS TOWARD GOALS   Diet: Clear Liquid    Nutrition Support:   -- TPN, 1440 mL/day with 170 g dextrose, 100 g AA, and 250 mL 20% IV SMOFlipid daily to provide 1478 kcal (23 kcal/kg), 1.6 g/kg PRO, GIR 1.9, and 34% kcal from fat per dosing weight   -- TF: [employer] Peptide 1.5 (plain) @ 10 mL/hr via J-tube + Relizorb cartridge    Intake: Trickle TF started yesterday, but not well tolerated so were turned off this morning.  Per provider note today, plan to hold TF today and continue TPN       NEW FINDINGS     Labs:  - K+, Mg++, and Phos WNL  - BGs 190 (H) this AM; Endo is following       INTERVENTIONS  Collaboration with other providers: discussed above TF/TPN plans with Endo and pharmacist    Monitoring/Evaluation  Progress toward goals will be monitored and evaluated per protocol.      Magi Estrella RD, , LD  Weekday Units covered: 5A (non-Heme Onc pts) and 7B (6414-0509)  Available by 5A or 7B Clinical Dietitifernanda Olivo  Weekend Coverage: Weekend Clinical Dietitian Pallavi    Clinical Dietitians no longer available via paging

## 2024-10-16 NOTE — PROGRESS NOTES
Colorectal Surgery Progress Note  [facility]  POD#5 from Ex Lap, BAUDILIO, SB resection, total Abd colectomy with iliorectal anastomosis, diverting loop ileostomy      Subjective: Unable to sleep overnight due to pain. Did not tolerate Tube Feeds, which were stopped during our visit. Still awaiting ROBF. Continues with TPN and NGT. No fever/chills.     Vitals:  Vitals:    10/15/24 1557 10/15/24 2032 10/15/24 2331 10/16/24 0627   BP: 93/47 105/56 115/63 109/61   BP Location: Right arm Right arm Right arm Right arm   Patient Position:       Pulse: 91 91 97 110   Resp: 18 18 18 18   Temp: 98.4  F (36.9  C) 99.1  F (37.3  C) 98.2  F (36.8  C) 99.2  F (37.3  C)   TempSrc: Oral Oral Oral Oral   SpO2: 100% 100%  93%   Weight:       Height:         I/O:  I/O last 3 completed shifts:  In: 2973.43 [P.O.:620; I.V.:40; NG/GT:180]  Out: 2870 [Urine:1100; Emesis/NG output:890; Stool:880]    Physical Exam:  Gen: AAOx3, NAD  Pulm: Non-labored breathing  Abd: Soft, non-distended, appropriately tender, no guarding   Midline incision clean, dry, and intact    Stoma pink and viable with bowel sweat in bag   KELI drain with serosanguineous output  Ext:  Warm and well-perfused    BMP  Recent Labs   Lab 10/16/24  0624 10/16/24  0148 10/16/24  0016 10/15/24  2210 10/15/24  0603 10/15/24  0549 10/14/24  0952 10/14/24  0734 10/13/24  2008 10/13/24  1759 10/13/24  1606 10/13/24  1450 10/13/24  0757 10/13/24  0606 10/12/24  0747 10/12/24  0525   NA  --   --   --   --   --  136  --  134*  --   --   --   --   --  133*  --  135   POTASSIUM  --   --   --   --   --  3.7  --  3.8  --   --   --   --   --  4.1  --  4.6   CHLORIDE  --   --   --   --   --  97*  --  97*  --   --   --   --   --  100  --  99   CO2  --   --   --   --   --  31*  --  29  --   --   --   --   --  28  --  26   BUN  --   --   --   --   --  12.6  --  10.6  --   --   --   --   --  11.3  --  12.2   CR  --   --   --   --   --  0.51  --  0.52  --   --   --   --    --  0.50*  --  0.61   * 155* 140* 214*   < > 180*   < > 297*   < >  --    < >  --    < > 186*   < > 243*   MAG  --   --   --   --   --  1.7  --  1.7  --   --   --  2.2  --  1.4*  --  1.8   PHOS  --   --   --   --   --  3.4  --  4.5  --  3.0  --   --   --  1.8*  --  2.8    < > = values in this interval not displayed.     CBC  Recent Labs   Lab 10/15/24  0549 10/14/24  0734 10/13/24  0606 10/12/24  0525   WBC 12.7* 14.1* 14.8* 11.9*   HGB 8.6* 9.2* 9.3* 10.6*   HCT 27.6* 28.6* 29.5* 33.8*    270 271 309         ASSESSMENT: This is a 59 year old female s/p TAC and ileorectal anastomosis, diverting loop ileostomy on 10/11 for slow transit constipation. PMH chronic pancreatitis s/p TPAT 2016, gastroparesis with G and J tubes. On TPN and J tube feeds with venting G tube at home. Pt receives all meds via J tube due to dysphagia.     Plan today  - discontinue PCA, PO oxy and IV Dilaudid for breakthrough pain  - DM II with TPAT: home basal/bolus regimen. Consulted oh, appreciate recs  - awaiting ROBF, hold tube feeds. Continue TPN.  - home depression, nausea, and hypothyroid medications  - continue TPN and lipid infusion  - continue rose  - Lidocaine patches and robaxin for back pain    Neuro/Pain:  Tylenol, oxy, robaxin, Dilaudid  CV: monitor HR, BP  PULM: encourage IS.  GI/FEN: CLD, TPN. Hold home TF  : maintain rose, start tamsulosin  Heme: acute blood loss anemia: monitor hgb, transfuse for hgb < 7  ID: no abx  Endocrine: DM II with TPAT: home basal/bolus regimen. Consulted endo, appreciate recs  Lines: G tube, J tube, port  Activity: as tolerated, encourage ambulation  Ppx: DVT ppx SQH  Dispo: pending clinical course      Patient was seen and discussed with Dr. Johnson, who discussed with Dr. Branch.    Keith Hernandez MD  General Surgery PGY-1  Pager: [phone]    Attestation:  Discussed with the house staff team or resident(s) and agree with the findings and plan in this  note.  Kaylene Branch MD  Colon and Rectal Surgery Attending  [phone]

## 2024-10-16 NOTE — PROGRESS NOTES
"   10/16/24 0930   Appointment Info   Signing Clinician's Name / Credentials (PT) Rachel Stock PT, DPT   Rehab Comments (PT) abd precs   Living Environment   People in Home significant other   Current Living Arrangements house   Home Accessibility stairs to enter home;stairs within home   Number of Stairs, Main Entrance 4   Stair Railings, Main Entrance railings on both sides of stairs   Number of Stairs, Within Home, Primary greater than 10 stairs   Stair Railings, Within Home, Primary railings safe and in good condition   Transportation Anticipated family or friend will provide   Living Environment Comments Lives in house with , 4 LEEROY with B railings, full flight of stairs up to second floor. States that she will be staying on main level likely   Self-Care   Usual Activity Tolerance good   Current Activity Tolerance moderate   Regular Exercise No   Equipment Currently Used at Home none   Fall history within last six months yes   Number of times patient has fallen within last six months 2  (reports that falls are due to her glasses being misaligned on her face from time to time)   Activity/Exercise/Self-Care Comment Previously IND with all mobility and ADLs but reports that \"things have felt more difficult lately\", uses no AD at baseline.  is very helpful and supportive. Reports that bed upstairs on second level has adjustable HOB   General Information   Onset of Illness/Injury or Date of Surgery 10/11/24   Referring Physician Harmony Rosenthal PA-C   Patient/Family Therapy Goals Statement (PT) wants to return home   Pertinent History of Current Problem (include personal factors and/or comorbidities that impact the POC) Per pt's chart, \"Pt is POD#5 from Ex Lap, BAUDILIO, SB resection, total Abd colectomy with iliorectal anastomosis, diverting loop ileostomy\"   Existing Precautions/Restrictions abdominal;fall   Weight-Bearing Status - LUE partial weight-bearing (% in comments)  (no lifting >10lbs) "   Weight-Bearing Status - RUE partial weight-bearing (% in comments)  (no lifting >10lbs)   General Observations activity, up with assist   Cognition   Affect/Mental Status (Cognition) WFL   Pain Assessment   Patient Currently in Pain Yes, see Vital Sign flowsheet   Posture    Posture Forward head position;Protracted shoulders   Range of Motion (ROM)   Range of Motion ROM is WFL   Strength (Manual Muscle Testing)   Strength (Manual Muscle Testing) strength is WFL   Bed Mobility   Comment, (Bed Mobility) supine>sit via log roll with CGA   Transfers   Comment, (Transfers) STS with IV pole and CGA   Gait/Stairs (Locomotion)   Comment, (Gait/Stairs) amb with IV pole and CGA   Balance   Balance Comments good static standing balance, good dynamic balance with IV pole   Sensory Examination   Sensory Perception patient reports no sensory changes   Clinical Impression   Criteria for Skilled Therapeutic Intervention Yes, treatment indicated   PT Diagnosis (PT) impaired functional mobility   Influenced by the following impairments precautions, pain, decreased activity tolerance,  decreased strength   Functional limitations due to impairments bed mobility, transfers, gait, stairs   Clinical Presentation (PT Evaluation Complexity) stable   Clinical Presentation Rationale clinical reasoning   Clinical Decision Making (Complexity) low complexity   Planned Therapy Interventions (PT) balance training;bed mobility training;gait training;home exercise program;motor coordination training;neuromuscular re-education;patient/family education;postural re-education;ROM (range of motion);stair training;strengthening;stretching;transfer training;progressive activity/exercise;risk factor education;home program guidelines   Risk & Benefits of therapy have been explained evaluation/treatment results reviewed;care plan/treatment goals reviewed;risks/benefits reviewed;current/potential barriers reviewed;participants voiced agreement with care  plan;participants included;patient   Clinical Impression Comments Pt is below IND mobility baseline, is limited by precautions, decreased strength, and decreased activity tolerance. Will benefit from skilled PT during LOS to improve impairments and progress back to PLOF.   PT Total Evaluation Time   PT Kena Low Complexity Minutes (87030) 8   Physical Therapy Goals   PT Frequency Daily   PT Predicted Duration/Target Date for Goal Attainment 11/27/24   PT Goals Bed Mobility;Transfers;Gait;Stairs;Aerobic Activity;PT Goal 1   PT: Bed Mobility Independent;Supine to/from sit;Within precautions   PT: Transfers Independent;Sit to/from stand;Bed to/from chair;Within precautions   PT: Gait Independent;Within precautions;Greater than 200 feet   PT: Stairs Supervision/stand-by assist;Greater than 10 stairs;Rail on left;Within precautions   PT: Perform aerobic activity with stable cardiovascular response continuous activity;10 minutes;15 minutes;ambulation;NuStep   PT: Goal 1 Pt will demo IND with log roll technique and verbalize IND abd precs.   PT Discharge Planning   PT Plan progress gait with IV pole vs no AD, flat bed mobility, stairs   PT Discharge Recommendation (DC Rec) home with assist;home with home care physical therapy   PT Rationale for DC Rec Pt is below IND mobility, primarily limited by precautions, decreased strength and activity tolerance.  is supportive and can assist if need be. Anticipate safe d/c home with assist once medically appropriate, would benefit from HH PT to further improve functional strength and endurance.   PT Brief overview of current status CGA with IV pole, encourage amb 3-4x/day with RN   PT Equipment Needed at Discharge other (see comments)  (TBD)

## 2024-10-16 NOTE — PLAN OF CARE
Goal Outcome Evaluation:      Plan of Care Reviewed With: patient    Overall Patient Progress: improving    Outcome Evaluation: POD#5    A/Ox4. VSS on RA. Pt reporting pain of 7-9/10 throughout the day throughout her abdomen and back. IV dilaudid PRN given x2. Oral Oxy PRN given x2 through tube. Pt stated pain only reduces to 7 with pain medication. Provider notified and IV Diluadid changed to 0.5 mg. Paged provider again about increasing oral oxy dose. Piedra removed at 1430. Monitor urine output. Paged provider about getting two lidocaine patches instead of one and it was ordered. Pt has a patch on her back and the other on her abdomen. TPN infusing at 60 ml/hr. Up with assist x1. Promethazine currently infusing on the red line. PIV taken out today since it was leaking and hurt the pt during a flush. Pt needs port dressing change still. Continue with POC.

## 2024-10-17 ENCOUNTER — APPOINTMENT (OUTPATIENT)
Dept: CT IMAGING | Facility: CLINIC | Age: 59
End: 2024-10-17
Payer: COMMERCIAL

## 2024-10-17 LAB
ANION GAP SERPL CALCULATED.3IONS-SCNC: 11 MMOL/L (ref 7–15)
BUN SERPL-MCNC: 14.9 MG/DL (ref 6–20)
CALCIUM SERPL-MCNC: 8.6 MG/DL (ref 8.8–10.4)
CHLORIDE SERPL-SCNC: 103 MMOL/L (ref 98–107)
CREAT SERPL-MCNC: 0.49 MG/DL (ref 0.51–0.95)
EGFRCR SERPLBLD CKD-EPI 2021: >90 ML/MIN/1.73M2
ERYTHROCYTE [DISTWIDTH] IN BLOOD BY AUTOMATED COUNT: 12.9 % (ref 10–15)
GLUCOSE BLDC GLUCOMTR-MCNC: 151 MG/DL (ref 70–99)
GLUCOSE BLDC GLUCOMTR-MCNC: 171 MG/DL (ref 70–99)
GLUCOSE BLDC GLUCOMTR-MCNC: 189 MG/DL (ref 70–99)
GLUCOSE BLDC GLUCOMTR-MCNC: 193 MG/DL (ref 70–99)
GLUCOSE BLDC GLUCOMTR-MCNC: 195 MG/DL (ref 70–99)
GLUCOSE BLDC GLUCOMTR-MCNC: 234 MG/DL (ref 70–99)
GLUCOSE SERPL-MCNC: 174 MG/DL (ref 70–99)
HCO3 SERPL-SCNC: 27 MMOL/L (ref 22–29)
HCT VFR BLD AUTO: 25.2 % (ref 35–47)
HGB BLD-MCNC: 8.1 G/DL (ref 11.7–15.7)
MAGNESIUM SERPL-MCNC: 2 MG/DL (ref 1.7–2.3)
MCH RBC QN AUTO: 30.3 PG (ref 26.5–33)
MCHC RBC AUTO-ENTMCNC: 32.1 G/DL (ref 31.5–36.5)
MCV RBC AUTO: 94 FL (ref 78–100)
PHOSPHATE SERPL-MCNC: 3.2 MG/DL (ref 2.5–4.5)
PLATELET # BLD AUTO: 412 10E3/UL (ref 150–450)
POTASSIUM SERPL-SCNC: 3.9 MMOL/L (ref 3.4–5.3)
RBC # BLD AUTO: 2.67 10E6/UL (ref 3.8–5.2)
SODIUM SERPL-SCNC: 141 MMOL/L (ref 135–145)
TRIGL SERPL-MCNC: 81 MG/DL
WBC # BLD AUTO: 15.3 10E3/UL (ref 4–11)

## 2024-10-17 PROCEDURE — 250N000013 HC RX MED GY IP 250 OP 250 PS 637

## 2024-10-17 PROCEDURE — 250N000013 HC RX MED GY IP 250 OP 250 PS 637: Performed by: COLON & RECTAL SURGERY

## 2024-10-17 PROCEDURE — 250N000011 HC RX IP 250 OP 636

## 2024-10-17 PROCEDURE — 99232 SBSQ HOSP IP/OBS MODERATE 35: CPT | Mod: 24 | Performed by: STUDENT IN AN ORGANIZED HEALTH CARE EDUCATION/TRAINING PROGRAM

## 2024-10-17 PROCEDURE — 250N000009 HC RX 250: Performed by: COLON & RECTAL SURGERY

## 2024-10-17 PROCEDURE — 258N000003 HC RX IP 258 OP 636: Performed by: SURGERY

## 2024-10-17 PROCEDURE — G0463 HOSPITAL OUTPT CLINIC VISIT: HCPCS

## 2024-10-17 PROCEDURE — 250N000013 HC RX MED GY IP 250 OP 250 PS 637: Performed by: STUDENT IN AN ORGANIZED HEALTH CARE EDUCATION/TRAINING PROGRAM

## 2024-10-17 PROCEDURE — 84100 ASSAY OF PHOSPHORUS: CPT | Performed by: COLON & RECTAL SURGERY

## 2024-10-17 PROCEDURE — 74177 CT ABD & PELVIS W/CONTRAST: CPT

## 2024-10-17 PROCEDURE — 250N000009 HC RX 250

## 2024-10-17 PROCEDURE — 84478 ASSAY OF TRIGLYCERIDES: CPT | Performed by: PHYSICIAN ASSISTANT

## 2024-10-17 PROCEDURE — 250N000013 HC RX MED GY IP 250 OP 250 PS 637: Performed by: SURGERY

## 2024-10-17 PROCEDURE — 250N000011 HC RX IP 250 OP 636: Performed by: STUDENT IN AN ORGANIZED HEALTH CARE EDUCATION/TRAINING PROGRAM

## 2024-10-17 PROCEDURE — 250N000009 HC RX 250: Performed by: SURGERY

## 2024-10-17 PROCEDURE — B4185 PARENTERAL SOL 10 GM LIPIDS: HCPCS | Performed by: COLON & RECTAL SURGERY

## 2024-10-17 PROCEDURE — 250N000011 HC RX IP 250 OP 636: Performed by: SURGERY

## 2024-10-17 PROCEDURE — 250N000009 HC RX 250: Performed by: STUDENT IN AN ORGANIZED HEALTH CARE EDUCATION/TRAINING PROGRAM

## 2024-10-17 PROCEDURE — 80048 BASIC METABOLIC PNL TOTAL CA: CPT | Performed by: PHYSICIAN ASSISTANT

## 2024-10-17 PROCEDURE — 85014 HEMATOCRIT: CPT | Performed by: STUDENT IN AN ORGANIZED HEALTH CARE EDUCATION/TRAINING PROGRAM

## 2024-10-17 PROCEDURE — 83735 ASSAY OF MAGNESIUM: CPT | Performed by: COLON & RECTAL SURGERY

## 2024-10-17 PROCEDURE — 74177 CT ABD & PELVIS W/CONTRAST: CPT | Mod: 26 | Performed by: RADIOLOGY

## 2024-10-17 PROCEDURE — 120N000002 HC R&B MED SURG/OB UMMC

## 2024-10-17 RX ORDER — PIPERACILLIN SODIUM, TAZOBACTAM SODIUM 3; .375 G/15ML; G/15ML
3.38 INJECTION, POWDER, LYOPHILIZED, FOR SOLUTION INTRAVENOUS EVERY 8 HOURS
Status: DISCONTINUED | OUTPATIENT
Start: 2024-10-17 | End: 2024-10-19

## 2024-10-17 RX ORDER — LEVOTHYROXINE SODIUM ANHYDROUS 100 UG/5ML
100 INJECTION, POWDER, LYOPHILIZED, FOR SOLUTION INTRAVENOUS DAILY
Status: DISCONTINUED | OUTPATIENT
Start: 2024-10-17 | End: 2024-10-20

## 2024-10-17 RX ORDER — OXYCODONE HCL 5 MG/5 ML
5-10 SOLUTION, ORAL ORAL
Status: DISCONTINUED | OUTPATIENT
Start: 2024-10-17 | End: 2024-10-28 | Stop reason: HOSPADM

## 2024-10-17 RX ORDER — HYDROMORPHONE HYDROCHLORIDE 1 MG/ML
.5-1 INJECTION, SOLUTION INTRAMUSCULAR; INTRAVENOUS; SUBCUTANEOUS
Status: DISCONTINUED | OUTPATIENT
Start: 2024-10-17 | End: 2024-10-19

## 2024-10-17 RX ORDER — TAMSULOSIN HYDROCHLORIDE 0.4 MG/1
0.4 CAPSULE ORAL DAILY
Status: DISCONTINUED | OUTPATIENT
Start: 2024-10-17 | End: 2024-10-17

## 2024-10-17 RX ORDER — CYCLOBENZAPRINE HCL 5 MG
10 TABLET ORAL EVERY 8 HOURS PRN
Status: DISCONTINUED | OUTPATIENT
Start: 2024-10-17 | End: 2024-10-28 | Stop reason: HOSPADM

## 2024-10-17 RX ORDER — ACETAMINOPHEN 325 MG/10.15ML
1000 LIQUID ORAL 4 TIMES DAILY
Status: DISCONTINUED | OUTPATIENT
Start: 2024-10-17 | End: 2024-10-24

## 2024-10-17 RX ORDER — IOPAMIDOL 755 MG/ML
82 INJECTION, SOLUTION INTRAVASCULAR ONCE
Status: COMPLETED | OUTPATIENT
Start: 2024-10-17 | End: 2024-10-17

## 2024-10-17 RX ADMIN — HYDROMORPHONE HYDROCHLORIDE 0.5 MG: 1 INJECTION, SOLUTION INTRAMUSCULAR; INTRAVENOUS; SUBCUTANEOUS at 15:37

## 2024-10-17 RX ADMIN — PROMETHAZINE HYDROCHLORIDE 25 MG: 25 INJECTION, SOLUTION INTRAMUSCULAR; INTRAVENOUS at 13:39

## 2024-10-17 RX ADMIN — METHOCARBAMOL 500 MG: 100 INJECTION INTRAMUSCULAR; INTRAVENOUS at 00:32

## 2024-10-17 RX ADMIN — INSULIN ASPART 1 UNITS: 100 INJECTION, SOLUTION INTRAVENOUS; SUBCUTANEOUS at 22:05

## 2024-10-17 RX ADMIN — INSULIN ASPART 2 UNITS: 100 INJECTION, SOLUTION INTRAVENOUS; SUBCUTANEOUS at 10:16

## 2024-10-17 RX ADMIN — ACETAMINOPHEN 1000 MG: 325 SOLUTION ORAL at 10:18

## 2024-10-17 RX ADMIN — SMOFLIPID 250 ML: 6; 6; 5; 3 INJECTION, EMULSION INTRAVENOUS at 21:52

## 2024-10-17 RX ADMIN — OXYCODONE HYDROCHLORIDE 10 MG: 5 SOLUTION ORAL at 16:55

## 2024-10-17 RX ADMIN — HYDROMORPHONE HYDROCHLORIDE 0.5 MG: 1 INJECTION, SOLUTION INTRAMUSCULAR; INTRAVENOUS; SUBCUTANEOUS at 02:52

## 2024-10-17 RX ADMIN — INSULIN ASPART 2 UNITS: 100 INJECTION, SOLUTION INTRAVENOUS; SUBCUTANEOUS at 18:35

## 2024-10-17 RX ADMIN — LEVOTHYROXINE SODIUM 100 MCG: 20 INJECTION, SOLUTION INTRAVENOUS at 18:36

## 2024-10-17 RX ADMIN — KETOROLAC TROMETHAMINE 30 MG: 30 INJECTION, SOLUTION INTRAMUSCULAR; INTRAVENOUS at 22:14

## 2024-10-17 RX ADMIN — SODIUM CHLORIDE, PRESERVATIVE FREE 71 ML: 5 INJECTION INTRAVENOUS at 11:01

## 2024-10-17 RX ADMIN — PIPERACILLIN AND TAZOBACTAM 3.38 G: 3; .375 INJECTION, POWDER, LYOPHILIZED, FOR SOLUTION INTRAVENOUS at 16:31

## 2024-10-17 RX ADMIN — ACETAMINOPHEN 1000 MG: 325 SOLUTION ORAL at 15:37

## 2024-10-17 RX ADMIN — LIDOCAINE 4% 2 PATCH: 40 PATCH TOPICAL at 20:13

## 2024-10-17 RX ADMIN — BACLOFEN 20 MG: 10 TABLET ORAL at 08:36

## 2024-10-17 RX ADMIN — PROMETHAZINE HYDROCHLORIDE 25 MG: 25 INJECTION, SOLUTION INTRAMUSCULAR; INTRAVENOUS at 21:52

## 2024-10-17 RX ADMIN — Medication 60 MG: at 20:10

## 2024-10-17 RX ADMIN — PROMETHAZINE HYDROCHLORIDE 25 MG: 25 INJECTION, SOLUTION INTRAMUSCULAR; INTRAVENOUS at 08:34

## 2024-10-17 RX ADMIN — HYDROMORPHONE HYDROCHLORIDE 0.5 MG: 1 INJECTION, SOLUTION INTRAMUSCULAR; INTRAVENOUS; SUBCUTANEOUS at 12:52

## 2024-10-17 RX ADMIN — INSULIN ASPART 2 UNITS: 100 INJECTION, SOLUTION INTRAVENOUS; SUBCUTANEOUS at 02:31

## 2024-10-17 RX ADMIN — HYDROMORPHONE HYDROCHLORIDE 0.5 MG: 1 INJECTION, SOLUTION INTRAMUSCULAR; INTRAVENOUS; SUBCUTANEOUS at 05:16

## 2024-10-17 RX ADMIN — BACLOFEN 20 MG: 10 TABLET ORAL at 20:10

## 2024-10-17 RX ADMIN — IOPAMIDOL 82 ML: 755 INJECTION, SOLUTION INTRAVENOUS at 11:01

## 2024-10-17 RX ADMIN — LEVOTHYROXINE SODIUM 150 MCG: 75 TABLET ORAL at 08:36

## 2024-10-17 RX ADMIN — ACETAMINOPHEN 1000 MG: 325 SOLUTION ORAL at 22:06

## 2024-10-17 RX ADMIN — KETOROLAC TROMETHAMINE 30 MG: 30 INJECTION, SOLUTION INTRAMUSCULAR; INTRAVENOUS at 04:41

## 2024-10-17 RX ADMIN — MAGNESIUM SULFATE HEPTAHYDRATE: 500 INJECTION, SOLUTION INTRAMUSCULAR; INTRAVENOUS at 21:51

## 2024-10-17 RX ADMIN — DOXAZOSIN 1 MG: 4 TABLET ORAL at 08:37

## 2024-10-17 RX ADMIN — INSULIN GLARGINE 3 UNITS: 100 INJECTION, SOLUTION SUBCUTANEOUS at 15:37

## 2024-10-17 RX ADMIN — HYDROMORPHONE HYDROCHLORIDE 1 MG: 1 INJECTION, SOLUTION INTRAMUSCULAR; INTRAVENOUS; SUBCUTANEOUS at 20:10

## 2024-10-17 RX ADMIN — HYDROMORPHONE HYDROCHLORIDE 0.5 MG: 1 INJECTION, SOLUTION INTRAMUSCULAR; INTRAVENOUS; SUBCUTANEOUS at 08:21

## 2024-10-17 RX ADMIN — Medication 50 MG: at 22:07

## 2024-10-17 RX ADMIN — ENOXAPARIN SODIUM 40 MG: 40 INJECTION SUBCUTANEOUS at 15:37

## 2024-10-17 RX ADMIN — BACLOFEN 20 MG: 10 TABLET ORAL at 13:41

## 2024-10-17 RX ADMIN — PANTOPRAZOLE SODIUM 40 MG: 40 INJECTION, POWDER, FOR SOLUTION INTRAVENOUS at 08:24

## 2024-10-17 RX ADMIN — INSULIN ASPART 1 UNITS: 100 INJECTION, SOLUTION INTRAVENOUS; SUBCUTANEOUS at 14:44

## 2024-10-17 RX ADMIN — INSULIN ASPART 1 UNITS: 100 INJECTION, SOLUTION INTRAVENOUS; SUBCUTANEOUS at 05:26

## 2024-10-17 RX ADMIN — PROCHLORPERAZINE EDISYLATE 10 MG: 5 INJECTION INTRAMUSCULAR; INTRAVENOUS at 04:57

## 2024-10-17 RX ADMIN — ACETAMINOPHEN 650 MG: 325 SOLUTION ORAL at 04:41

## 2024-10-17 RX ADMIN — HYDROMORPHONE HYDROCHLORIDE 0.5 MG: 1 INJECTION, SOLUTION INTRAMUSCULAR; INTRAVENOUS; SUBCUTANEOUS at 10:36

## 2024-10-17 ASSESSMENT — ACTIVITIES OF DAILY LIVING (ADL)
ADLS_ACUITY_SCORE: 32
ADLS_ACUITY_SCORE: 35
ADLS_ACUITY_SCORE: 32
ADLS_ACUITY_SCORE: 35
ADLS_ACUITY_SCORE: 35
ADLS_ACUITY_SCORE: 32
ADLS_ACUITY_SCORE: 32
ADLS_ACUITY_SCORE: 35
ADLS_ACUITY_SCORE: 32
ADLS_ACUITY_SCORE: 35
ADLS_ACUITY_SCORE: 32
ADLS_ACUITY_SCORE: 32
ADLS_ACUITY_SCORE: 35
ADLS_ACUITY_SCORE: 32
ADLS_ACUITY_SCORE: 35
ADLS_ACUITY_SCORE: 32
ADLS_ACUITY_SCORE: 35
ADLS_ACUITY_SCORE: 32
ADLS_ACUITY_SCORE: 35
ADLS_ACUITY_SCORE: 32

## 2024-10-17 NOTE — PROGRESS NOTES
Colorectal Surgery Progress Note  [facility]  POD#6      Subjective:  Continued abdominal pain at J tube site. No fever/chills. Continues on CLD/TPN. Having some leakage through J tube. Mild Nausea.Voiding independently, Ipedra removed yesterday.    Vitals:  Vitals:    10/16/24 2102 10/17/24 0045 10/17/24 0400 10/17/24 0434   BP: 110/57   111/64   BP Location: Right arm      Pulse: 100 97     Resp: 18 18  18   Temp: 98.7  F (37.1  C) 98.4  F (36.9  C) 98.6  F (37  C)    TempSrc: Oral Oral Oral    SpO2: 99%      Weight:       Height:         I/O:  I/O last 3 completed shifts:  In: 840 [P.O.:360]  Out: 3875 [Urine:1950; Emesis/NG output:1250; Stool:675]    Physical Exam:  Gen: AAOx3, NAD  Pulm: Non-labored breathing  Abd: Soft, non-distended, appropriately tender, no guarding   Incision C/D/I    Stoma pink and viable with bowel sweat in bag, no stool  Ext:  Warm and well-perfused    BMP  Recent Labs   Lab 10/17/24  0525 10/17/24  0435 10/17/24  0220 10/16/24  2152 10/16/24  1011 10/16/24  0634 10/15/24  0603 10/15/24  0549 10/14/24  0952 10/14/24  0734   NA  --  141  --   --   --  137  --  136  --  134*   POTASSIUM  --  3.9  --   --   --  3.7  --  3.7  --  3.8   CHLORIDE  --  103  --   --   --  99  --  97*  --  97*   CO2  --  27  --   --   --  29  --  31*  --  29   BUN  --  14.9  --   --   --  12.6  --  12.6  --  10.6   CR  --  0.49*  --   --   --  0.53  --  0.51  --  0.52   * 174* 195* 190*   < > 214*   < > 180*   < > 297*   MAG  --  2.0  --   --   --  1.8  --  1.7  --  1.7   PHOS  --  3.2  --   --   --  3.0  --  3.4  --  4.5    < > = values in this interval not displayed.     CBC  Recent Labs   Lab 10/17/24  0647 10/16/24  0634 10/15/24  0549 10/14/24  0734   WBC 15.3* 16.2* 12.7* 14.1*   HGB 8.1* 8.4* 8.6* 9.2*   HCT 25.2* 26.3* 27.6* 28.6*    329 304 270         ASSESSMENT: This is a 59 year old female s/p TAC and ileorectal anastomosis, diverting loop ileostomy on 10/11  for slow transit constipation. [facility] chronic pancreatitis s/p TPAT 2016, gastroparesis with G and J tubes. On TPN and J tube feeds with venting G tube at home. Pt receives all meds via J tube due to dysphagia. Having some leakage from J tube, J-tube painful. Will get CT scan to evaluate position     Plan today  - CT A/P w IV and Enteric contrast via G-tube.  - Do not use J tube until further notice.  - DM II with TPAT: home basal/bolus regimen. Consulted endo, appreciate recs  - awaiting ROBF, hold tube feeds. Continue TPN.  - home depression, nausea, and hypothyroid medications  - continue TPN and lipid infusion  - Lidocaine patches and robaxin for back pain     Neuro/Pain:  Tylenol, oxy, robaxin, Dilaudid, lido patch  CV: monitor HR, BP  PULM: encourage IS.  GI/FEN: CLD, TPN. Hold home TF  : maintain rose, start tamsulosin  Heme: acute blood loss anemia: monitor hgb, transfuse for hgb < 7  ID: no abx  Endocrine: DM II with TPAT: home basal/bolus regimen. Consulted endo, appreciate recs  Lines: G tube, J tube, port  Activity: as tolerated, encourage ambulation  Ppx: DVT ppx SQH  Dispo: pending clinical course        Patient was seen and discussed with Dr. Johnson, who discussed with Dr. Bartholomew.     eKith Hernandez MD  General Surgery PGY-1  Pager: [phone]    Clinically Significant Risk Factors               # Hypoalbuminemia: Lowest albumin = 3.1 g/dL at 10/14/2024  7:34 AM, will monitor as appropriate                   # Financial/Environmental Concerns: none

## 2024-10-17 NOTE — PROGRESS NOTES
Inpatient Diabetes Management Service: Daily Progress Note     HPI: Dinora Mcghee is a 59 year old with pancreatogenic diabetes s/p pancreatectomy and TPAIT in 2016,complicated by gastroparesis, as well as a comorbid history of HTN,HLD, Hypothyroidism, chronic nausea/and constipation, chronic malnutrition and TPN and Tube feeds dependent , who was admitted on 10/11/2024 for exploratory laparotomy, extensive lysis of adhesions, revision of J tube,and small bowel resection. Inpatient Diabetes Service consulted for management of diabetes in the setting of continuous TPN use and expected TF use in future. G and J tube on gravity          Assessment/Plan:     Assessment:   Pancreatogenic diabetes s/p pancreatectomy and TPAIT in 2016,went off insulin for about 4 years and now back on insulin , A1C-5.4% in 7/23/2024  S/p exploratory laparotomy, extensive lysis of adhesions, revision of J tube,and small bowel resection on 10/11/24  Chronic malnutrition, was on TPN X12 hour, 3 days/week and Tube feeds x 12 hours, 7 days/week at home, now tube feeds are off, TPN continuous  Poor oral intake  Continuous TPN related hyperglycemia  Gastroparesis    Plan/Recommendations:   - Start regular insulin 8 units with continuous TPN dextrose at 200 g (goal) = 0.4 units/gram dextrose.   - Lantus 3 units q 24 hrs for 24 hour at 1300.  - Novolog carb coverage: 1 unit per 20 g cho TID AC and prn snacks/supplements  - Novolog Correction Scale: medium resistance (ISF: 1:50) every 4 hours.  - BG monitoring: Every 4 hours.  - Hypoglycemia protocol    - Carb counting protocol    Discussion: BG trending higher since TPN dextrose increased to 200 grams last night (currently no regular insulin added, dextrose at goal). Also has clear liquid diet and intermittently eating a little. NPO this morning for CT and BG elevated to >200 at 10AM. Will add regular insulin to TPN starting tonight. Keeping BG checks and correction q4hr  since minimal PO intake and currently NPO.    Discharge Planning: (tentative)  Medications: TBD  Test Claims: TBD  Education:  Needs to be assessed closer to discharge.   Outpatient Follow-up:  recommend [facility] Endocrinology vs PCP     Please notify Inpatient Diabetes Service if changes are planned to steroids, nutrition, TPN/TF and anticipated procedures requiring prolonged NPO status.         Interval History/Review of Systems :   - The last 24 hours progress and nursing notes reviewed.  - Feeling better today, abdominal pain better controlled since discontinuing TF yesterday morning and changes to pain medications.  - Having some leakage from J-tube, NPO this morning for CT A/P with IV and enteral contrast via G-tube.   - Wearing Walkabout 3 CGM.     Planned Procedures/Surgeries: 10/17 CT A/P with IV and enteral contrast via G-tube.      Inpatient Glucose Control:       Recent Labs   Lab 10/17/24  0525 10/17/24  0220 10/16/24  2152 10/16/24  1742 10/16/24  1424 10/16/24  1011   * 195* 190* 160* 180* 181*             Medications Impacting Glycemia:   Steroids: None  D5W containing solutions/medications: None  Other medications impacting glucose: NOne         Nutrition:   Orders Placed This Encounter      Full Liquid Diet  Prior to admission: was on TPN on Sunday, Tuesday and Thursday nights.  Also on enteral feeds  Supplements: none   TF: none. Was on 10/15/2024:  Amy Glass Peptide 1.5 (plain) @ 10 mL/hr via J-tube (only ran PM 10/15- 6AM 10/16).   TPN:  Continuous: dextrose 140 g (10/13) -->  dextrose 170 g (10/14-10/15) --> dextrose increased to 200 g (10/17... current)        Diabetes History: see full consult note for complete diabetes history   Diabetes Type and Duration: Since 2016, s/p pancreatectomy and TPAI     PTA Medication Regimen: Takes insulin levemir 3 units daily and novolog 1 unit if BG is 175-275, and 2 units if >275 up to every 4 hours as needed. Reports some times she keeps on  giving  "correction whole night but other times does it once or not require it all.  Historical Diabetes Medications: As above     Glucose monitoring device and frequency: Checks with Kiyon 3  Outpatient Diabetes Provider: None        Physical Exam:   /64   Pulse 97   Temp 98.6  F (37  C) (Oral)   Resp 18   Ht 1.727 m (5' 8\")   Wt 61 kg (134 lb 6.4 oz)   SpO2 99%   BMI 20.44 kg/m    General:  well appearing, NAD, resting comfortably in bed.  Lungs: unlabored breathing on RA.  Mental Status:  Alert and oriented x3  Psych:  calm, cooperative, normal mood and affect          Data:     Lab Results   Component Value Date    A1C 5.4 07/23/2024    A1C 5.6 03/20/2024    A1C 5.4 02/17/2022    A1C 5.2 11/27/2021    A1C 5.5 09/18/2021    A1C 5.4 08/05/2021    A1C 5.7 05/12/2021    A1C 5.9 (A) 04/01/2021    A1C 5.5 12/17/2020    A1C 5.8 (H) 07/30/2020       ROUTINE IP LABS (Last four results)  BMP  Recent Labs   Lab 10/17/24  0525 10/17/24  0220 10/16/24  2152 10/16/24  1742 10/16/24  1011 10/16/24  0634 10/15/24  0603 10/15/24  0549 10/14/24  0952 10/14/24  0734 10/13/24  0757 10/13/24  0606   NA  --   --   --   --   --  137  --  136  --  134*  --  133*   POTASSIUM  --   --   --   --   --  3.7  --  3.7  --  3.8  --  4.1   CHLORIDE  --   --   --   --   --  99  --  97*  --  97*  --  100   KASSI  --   --   --   --   --  8.9  --  8.4*  --  8.7*  --  8.7*   CO2  --   --   --   --   --  29  --  31*  --  29  --  28   BUN  --   --   --   --   --  12.6  --  12.6  --  10.6  --  11.3   CR  --   --   --   --   --  0.53  --  0.51  --  0.52  --  0.50*   * 195* 190* 160*   < > 214*   < > 180*   < > 297*   < > 186*    < > = values in this interval not displayed.     CBC  Recent Labs   Lab 10/16/24  0634 10/15/24  0549 10/14/24  0734 10/13/24  0606   WBC 16.2* 12.7* 14.1* 14.8*   RBC 2.77* 2.88* 2.97* 3.06*   HGB 8.4* 8.6* 9.2* 9.3*   HCT 26.3* 27.6* 28.6* 29.5*   MCV 95 96 96 96   MCH 30.3 29.9 31.0 30.4   MCHC 31.9 31.2* 32.2 31.5 "   RDW 12.9 13.0 12.9 12.8    304 270 139     Inpatient Diabetes Service will continue to follow, please don't hesitate to contact the team with any questions or concerns.     Homa Valencia PA-C  Inpatient Diabetes Service  Pager: [phone]  Available on vocera    Plan discussed with patient, bedside RN, and primary team via this note.    To contact Inpatient Diabetes Service:     7 AM - 5 PM: Page the IDS GIGI following the patient that day (see filed or incomplete progress notes/consult notes under Endocrinology)    OR if uncertain of provider assignment: page job code [phone]    5 PM - 7 AM: First call after hours is to primary service.    For urgent after-hours questions, page job code for on call fellow: [phone]     I spent a total of 35 minutes on the date of the encounter doing prep/post-work, chart review, history and exam, documentation and further activities per the note including lab review, multidisciplinary communication, counseling the patient and/or coordinating care regarding acute hyper/hypoglycemic management, as well as discharge management and planning/communication.

## 2024-10-17 NOTE — PROGRESS NOTES
"[facility] Nurse Inpatient Assessment     Consulted for: Diverting loop ileostomy    Patient History (according to provider note(s):      \"58 y/o female has a past medical history of Allergic rhinitis, cause unspecified, Allergy to other foods, Arthritis, Choledocholithiasis, Chronic abdominal pain, Chronic constipation, Chronic nausea, Chronic pancreatitis (H), Degeneration of lumbar or lumbosacral intervertebral disc, Esophageal reflux, Gastroparesis, Hiatal hernia, History of pituitary adenoma, Hypothyroidism, Migraines, Mild hyperlipidemia, On tube feeding diet, Pancreatic disease, PONV (postoperative nausea and vomiting), Portacath in place, Type 1 diabetes mellitus without complication (H) (5/9/2022), and Unspecified hearing loss.\"     Assessment:      Assessment of new end Ileostomy:  Diagnosis Pertinent to Stoma:  Slow transit constipation       Surgery Date: 10/11/24  Surgeon:[facility]: [facility]  Pouching system in place on assessment today: Leandro two piece, cut to fit, flat, and barrier ring   Pouch barrier status: Minimal melting  Pouch last changed/wear time: 3 day  Reason for pouch change today: ostomy education and routine schedule  Effectiveness of current pouching/ supply plan: Effective  Change made with ostomy management today: No  Pouching system placed today: [place] 2 piece flat cut to fit with barrier ring    Supplies: gathered, at bedside, supplies stored on unit, and discussed with patient     Stoma location: RLQ  Stoma size: 1 1/4 inches  Stoma appearance: red, round, moist, edematous, and protruberant  Mucocutaneous junction:  intact  Peristomal complication(s): none  Output: watery and yellow  Output volume emptied during visit: 100 ml  Abdominal assessment: Firm  Surgical site(s): open to air and staples intact  NG still in place? No  Pain: Sharp  Is patient still on a PCA? No    Ostomy education " "assessment:  Participant of teaching session today: patient   Education completed today: Pouching system assessment , Pouch change return demonstration, Pouch emptying return demonstration, Importance of hydration, When to seek medical attention, Odor/flatus management , Infection prevention/hygiene , Hernia prevention, Lifestyle adjustments , and Discharge instructions  Educational materials/methods: Verbal, Written, FOD [place] education hand out, and Addressed patient/caregiver questions/concerns  Education still needed: Discharge instructions  Learning Comprehension:   Psychosocial assessment: is a retired  and has had to manage TPN, drains at home with home care assist   Patient readiness for education today: attentive and active participation  Following visit: patient  is able to demonstrate;         1. How to empty their pouch? Yes and Independent        2. How to change their pouch? Yes and with minimal assist        3. How to read and record intake and output correctly? Yes and Demo provided   Preparation for discharge completed: Placed prescription recommendations in discharge navigator for MD to sign, Ensured patient has extra supplies for discharge, Discussed how to order supplies after discharge , Ordered samples from  after gaining consent from patient/caregiver, Discussed how and when to make an outpatient WOC nurse appointment after discharge, Prepared for discharge home with home care, and Discuss signs/symptoms of when to seek medical attention  Preparation for discharge still needed: none  Pt support system on discharge: spouse  WOC recommend home care? Yes  Face to face time: 45 minutes    Treatment Plan:     RLQ Ileostomy pouching plan:   Pouching system: ostomy supplies pouches: [place] 57 FECAL (213031) ostomy supplies barrier: Leandro 57mm FLAT (784196)  Accessories used: [facility] ostomy accessories: 2\" Cera Barrier Ring (488618)   Frequency of pouch changes: PRN " leakage and Three times a week  WOC follow up plan: Twice weekly  Bedside RN interventions: Change pouch PRN if leaking using the supplies above, Empty pouch when 1/3 to 1/2 full, ensure to clean pouch outlet after emptying to prevent odor, Notify WOC for ongoing pouch leakage, Document stoma appearance and output volume, color, and consistency every shift, and Encourage patient to empty pouch with assist     Outpatient plan:  Request the following supplies:      [facility]    2 piece flat wafer 57mm  # 91374                           2 piece opaque Fecal pouch without Filter 57mm- # 62677  Accessories  2  barrier ring #8805     Adapt powder #7906    No sting film barrier # 3345                           Adapt universal adhesive remover #3287                          Adapt M-9 Spray room deodorizer #8196                           Leandro barrier extenders #37848                                 Change your pouch 2 times a week, more often if leaking.    DATA:     Current support surface: Standard  Standard gel mattress (Isoflex)  Containment of urine/stool: Ileostomy pouch  BMI: Body mass index is 20.53 kg/m .   Active diet order: Orders Placed This Encounter      NPO per Anesthesia Guidelines for Procedure/Surgery Except for: Meds     Output: I/O last 3 completed shifts:  In: 840 [P.O.:360]  Out: 3875 [Urine:1950; Emesis/NG output:1250; Stool:675]     Labs:   Recent Labs   Lab 10/17/24  0647 10/15/24  0549 10/14/24  0734 10/13/24  0606   ALBUMIN  --   --  3.1* 3.2*   PREALB  --   --   --  8.1*   HGB 8.1*   < > 9.2* 9.3*   INR  --   --  1.01 1.20*   WBC 15.3*   < > 14.1* 14.8*    < > = values in this interval not displayed.     Pressure injury risk assessment:   Sensory Perception: 4-->no impairment  Moisture: 4-->rarely moist  Activity: 3-->walks occasionally  Mobility: 3-->slightly limited  Nutrition: 3-->adequate  Friction and Shear: 3-->no apparent problem  Martinez Score: 20

## 2024-10-17 NOTE — DISCHARGE INSTRUCTIONS
[facility]     Name: Dinora Mcghee  Date: 10/17/24    To order your ostomy supplies    The ostomy Supplier needs this supply list  to process your order. You will need to fax/deliver this list, along with your Insurance information. Your home care nurse can assist with this process.    List of Ostomy Distributors      [employer] [employer]  Ph. [phone] Fax # [phone]  [facility] [employer]   Ph. [phone] [phone]  [facility]   Ph. [phone]  [facility]   Ph. [phone] [phone]  Or Call your insurance provider for their preferred supplier    Your Medical Supplier will need your surgeon's name, phone and fax number    Clinic:                     Phone                            Fax  Colorectal Surgery:    [phone]  Verbal Order for ostomy supplies for 1 Month per: Jazmine Mccabe RN, CWOCN                                                                                                   Authorizing MD: Dr. Branch    Quantity of pouches:   20  /mo.    Request the following supplies:     Leandro    2 piece flat wafer 57mm  # 90014 or 2 piece soft convex wafer 57 mm #27581                          2 piece opaque Fecal pouch without Filter 57mm- # 50396  Or                           1 piece Coloplast Rashi convex light cut to fit  #63117  Accessories  2  barrier ring #8905     Adapt powder #7906    No sting film barrier # 5280                           Adapt universal adhesive remover #2364                          Adapt M-9 Spray room deodorizer #2013                           [place] barrier extenders #22107                                   Change your pouch 2 times a week, more often if leaking.   If you are cutting a hole in the wafer of your pouch, recheck stoma size and adjust pouch opening as needed every week    . Call the Ostomy Nurse at [facility]       [address] [place] [address]  ": [phone]   Schedule a follow-up visit in 2 to 4 weeks after your surgery, sooner if having problems Bring a complete set of pouch-changing supplies to this visit    [facility] outpatient [facility] department  [address] [place] [address]   number- [phone]     Problems you should Report  - The stoma turns blue or darker in color.  - Cuts or sores around the stoma.  - Red, raw or painful skin around the stoma.  - Any bulging of the skin around the stoma.  - A pouch that leaks every day.  - Problems making the right size hole in the pouch wafer.   Please call with any questions or concerns.         Inpatient Diabetes Management Team Discharge Instructions:    Blood Glucose Checks: Three times daily before meals, and at bedtime via yandy 3 CGM. Please call if you have more than 2 blood sugar over 250 in one day, or any sugars less than 75.      Glucose Control Regimen:      Lantus insulin: 3 units every day at 8 pm     Regular insulin: 18 units in TPN cycled over 12 hours with dextrose 200 g      Novolog insulin to cover carbohydrates with meals: ( \"fixed meal dose\":  2 units with each meal.  Do not give if do not eat meal ) OR If you want to try counting carbs again 1 unit of insulin for 15g of carb.     Novolog insulin correction scale: three times daily before meals and at bedtime (has Yandy)  Give correction insulin based on the following scale: *Give even if skipping a meal* at 8AM, noon, 5PM, bedtime     Do not give more frequently then every 4 hours or you will stack insulin and risk going low  Blood Glucose Before Meals, Add   Less than 140 0 units   140 -189  1 units   190 - 239 2 units   240 - 289 3 units   290 - 339 4 units    340 - 389  5 units   390 - 439 6 units   >/= 440 7 units         Endocrinology Outpatient follow up:    Patient to call and schedule outpatient diabetes appointment 1-2 weeks from discharge with current provider (Dr. Melissa)      If you have urgent questions or " "concerns regarding your blood sugars or insulin, you may contact [phone] (the main hospital ). Ask to speak with the endocrinologist on call.     If you have urgent questions or concerns regarding your blood sugars or insulin, you may contact [phone] (the main hospital ). Ask to speak with the endocrinologist on call.    How to treat a low blood sugar (<70 mg/dL):  You may be experiencing hypoglycemia (low blood sugar) if:  BG less than 70 mg/dL or when feeling symptoms of hypoglycemia such as:  Sweating  Shakiness/Tremors  Increased appetite  Nausea  Dizziness or light-headedness  Sleepiness  Weakness  Rapid heart rate  Headache  Tingling around mouth and tongue    If you are having a low blood sugar, do the following steps: \"RULE OF 15s\"   -Eat 15 grams simple Carbs (1/2 cup orange juice, 4 glucose tablets, 1/2 cup regular soda, packet of fruit snacks)  -Wait 15 minutes  -Test with your blood glucose meter again  -If your blood sugar is above 70, then continue normally  -If still less than 70, repeat above process until over 70  -Call clinic for recurrent low blood glucose (more than 2 a week or 2 a day)       If you have urgent questions or concerns regarding your blood sugars or insulin, you may contact [phone] (the main hospital ). Ask to speak with the endocrinologist on call.    Your target A1c value is less than 7%.    Thank you for letting the Diabetes Management Team be involved in your care!  "

## 2024-10-17 NOTE — PROGRESS NOTES
"[facility] Nurse Inpatient Assessment     Consulted for: Diverting loop ileostomy    Patient History (according to provider note(s):      \"60 y/o female has a past medical history of Allergic rhinitis, cause unspecified, Allergy to other foods, Arthritis, Choledocholithiasis, Chronic abdominal pain, Chronic constipation, Chronic nausea, Chronic pancreatitis (H), Degeneration of lumbar or lumbosacral intervertebral disc, Esophageal reflux, Gastroparesis, Hiatal hernia, History of pituitary adenoma, Hypothyroidism, Migraines, Mild hyperlipidemia, On tube feeding diet, Pancreatic disease, PONV (postoperative nausea and vomiting), Portacath in place, Type 1 diabetes mellitus without complication (H) (5/9/2022), and Unspecified hearing loss.\"     Assessment:      Assessment of new end Ileostomy:  Diagnosis Pertinent to Stoma:  Slow transit constipation       Surgery Date: 10/11/24  Surgeon:[facility]: [facility]  Pouching system in place on assessment today: Leandro one piece, cut to fit, flat, and barrier ring   Pouch barrier status: intact  Pouch last changed/wear time: 2 day  Reason for pouch change today: pouch not changed today  Effectiveness of current pouching/ supply plan: Effective  Change made with ostomy management today: No  Pouching system placed today:  not changed today     Supplies: supplies stored on unit and discussed with patient     Stoma location: RLQ  Stoma size: 1 1/4 inches  Stoma appearance: red, round, moist, edematous, and protruberant  Mucocutaneous junction:  not visualized (barrier in place)  Peristomal complication(s): not visualized   Output: watery and yellow  Output volume emptied during visit: 0 ml  Abdominal assessment: Distended  Surgical site(s): open to air and staples intact  NG still in place? No  Pain: Sharp  Is patient still on a PCA? Yes    Ostomy education assessment:  Participant of teaching session today: " "patient   Education completed today: Pouching system assessment , Importance of hydration, When to seek medical attention, Odor/flatus management , Infection prevention/hygiene , and Lifestyle adjustments   Educational materials/methods: Verbal, Written, FOD Bailey education hand out, and Addressed patient/caregiver questions/concerns  Education still needed: Discharge instructions  Learning Comprehension:   Psychosocial assessment: is a retired  and has had to manage TPN, drains at home with home care assist   Patient readiness for education today: attentive and active participation  Following previous visit: patient  is able to demonstrate;         1. How to empty their pouch? Yes and with minimal assist        2. How to change their pouch? Yes and with minimal assist        3. How to read and record intake and output correctly? Yes and Demo provided   Preparation for discharge completed: No discharge preparation started yet  Preparation for discharge still needed: No discharge preparation started yet  Pt support system on discharge: spouse  WOC recommend home care? Yes  Face to face time: 20 minutes      Treatment Plan:     RLQ Ileostomy pouching plan:   Pouching system: ostomy supplies pouches: Leandro 57 FECAL ([id number]) ostomy supplies barrier: [place] 57mm FLAT ([id number])  Accessories used: WO ostomy accessories: 2\" Cera Barrier Ring ([id number])   Frequency of pouch changes: PRN leakage and Three times a week  WOC follow up plan: Daily Monday-Friday (as able)  Bedside RN interventions: Change pouch PRN if leaking using the supplies above, Empty pouch when 1/3 to 1/2 full, ensure to clean pouch outlet after emptying to prevent odor, Notify WOC for ongoing pouch leakage, Document stoma appearance and output volume, color, and consistency every shift, and Encourage patient to empty pouch with assist     DATA:     Current support surface: Standard  Standard gel mattress (Isoflex)  Containment of " urine/stool: Ileostomy pouch  BMI: Body mass index is 20.44 kg/m .   Active diet order: Orders Placed This Encounter      Full Liquid Diet     Output: I/O last 3 completed shifts:  In: 1806.44 [P.O.:340; I.V.:18; NG/GT:90]  Out: 3325 [Urine:1700; Emesis/NG output:550; Stool:1075]     Labs:   Recent Labs   Lab 10/16/24  0634 10/15/24  0549 10/14/24  0734 10/13/24  0606   ALBUMIN  --   --  3.1* 3.2*   PREALB  --   --   --  8.1*   HGB 8.4*   < > 9.2* 9.3*   INR  --   --  1.01 1.20*   WBC 16.2*   < > 14.1* 14.8*    < > = values in this interval not displayed.     Pressure injury risk assessment:   Sensory Perception: 4-->no impairment  Moisture: 4-->rarely moist  Activity: 3-->walks occasionally  Mobility: 3-->slightly limited  Nutrition: 3-->adequate  Friction and Shear: 3-->no apparent problem  Martinez Score: 20

## 2024-10-17 NOTE — PLAN OF CARE
"Goal Outcome Evaluation:    Plan of Care Reviewed With: patient    Overall Patient Progress: improvingOverall Patient Progress: improving    /57 (BP Location: Right arm)   Pulse 100   Temp 98.7  F (37.1  C) (Oral)   Resp 18   Ht 1.727 m (5' 8\")   Wt 61 kg (134 lb 6.4 oz)   SpO2 99%   BMI 20.44 kg/m        1500 - 2300     POD #5     A&O x4, calls appropriately  Afebrile, OVSS on RA  C/o abdominal pain/cramping/aching radiating to her back, rating pain at 8-10/10, pain was managed w/ PRN dilaudid x2 , scheduled tylenol, baclofen, and lidocaine patches  C/o feeling nauseous, compazine was given, effective  Midline incision w/ stables, CDI and JACKIE  Denies dizziness, increased SOB and chest pain    Tolerating full liquids w/ poor appetite  BGs were 160 and 190  Voiding spontanously w/o difficulty and w/ good UOP  Ostomy intact w/ gas, and good output  J-tube leaking when giving meds, it is clamped   G-tube open to gravity w/ good ouput   Independent/SBA in the room and to the bathroom    DL CVC, purple is infusing TPN and lipids, red TKO      "

## 2024-10-17 NOTE — PROVIDER NOTIFICATION
Paged colorectal Herminia Garza re: 2S 0936 EO Patient advanced from NPO to full liquid diet and states she needs Creon pancreatic enzymes with meals. Will you please add this? Thank you. Courtney RHODES -[phone]

## 2024-10-17 NOTE — PLAN OF CARE
Goal Outcome Evaluation:      Plan of Care Reviewed With: patient    Overall Patient Progress: no changeOverall Patient Progress: no change    [id number]    Pt A&O, VSS on RA, afebrile. Pain somewhat managed with PRN x2 IV dilaudid, Toradol. C/O of nausea, compazine effective. J- tube clamped, some slight leakage with medication. Team notified and will continue to monitor. G-tube with moderate output. Ostomy with gas and good output. Midline incision closed by staples. BG checks 195,185. TPN/lipids infusing through purple lumen. Up with SBA to bathroom.

## 2024-10-17 NOTE — PLAN OF CARE
Goal Outcome Evaluation:  NPO due to continued abdominal pain and her J-tube was leaking slightly. She had an abdominal CT scan and is awaiting result. Afebrile. Given IV Dilaudid PRN for abdominal pain with some relief. Up with standby assistance. Denied nausea.

## 2024-10-18 ENCOUNTER — APPOINTMENT (OUTPATIENT)
Dept: PHYSICAL THERAPY | Facility: CLINIC | Age: 59
End: 2024-10-18
Attending: COLON & RECTAL SURGERY
Payer: COMMERCIAL

## 2024-10-18 LAB
ANION GAP SERPL CALCULATED.3IONS-SCNC: 12 MMOL/L (ref 7–15)
BUN SERPL-MCNC: 17.3 MG/DL (ref 6–20)
CALCIUM SERPL-MCNC: 9.1 MG/DL (ref 8.8–10.4)
CHLORIDE SERPL-SCNC: 101 MMOL/L (ref 98–107)
CREAT SERPL-MCNC: 0.57 MG/DL (ref 0.51–0.95)
EGFRCR SERPLBLD CKD-EPI 2021: >90 ML/MIN/1.73M2
ERYTHROCYTE [DISTWIDTH] IN BLOOD BY AUTOMATED COUNT: 13.1 % (ref 10–15)
GLUCOSE BLDC GLUCOMTR-MCNC: 185 MG/DL (ref 70–99)
GLUCOSE BLDC GLUCOMTR-MCNC: 187 MG/DL (ref 70–99)
GLUCOSE BLDC GLUCOMTR-MCNC: 193 MG/DL (ref 70–99)
GLUCOSE BLDC GLUCOMTR-MCNC: 197 MG/DL (ref 70–99)
GLUCOSE BLDC GLUCOMTR-MCNC: 203 MG/DL (ref 70–99)
GLUCOSE BLDC GLUCOMTR-MCNC: 223 MG/DL (ref 70–99)
GLUCOSE SERPL-MCNC: 192 MG/DL (ref 70–99)
HCO3 SERPL-SCNC: 26 MMOL/L (ref 22–29)
HCT VFR BLD AUTO: 28 % (ref 35–47)
HGB BLD-MCNC: 9 G/DL (ref 11.7–15.7)
MAGNESIUM SERPL-MCNC: 2.1 MG/DL (ref 1.7–2.3)
MCH RBC QN AUTO: 30.1 PG (ref 26.5–33)
MCHC RBC AUTO-ENTMCNC: 32.1 G/DL (ref 31.5–36.5)
MCV RBC AUTO: 94 FL (ref 78–100)
PHOSPHATE SERPL-MCNC: 3.8 MG/DL (ref 2.5–4.5)
PLATELET # BLD AUTO: 547 10E3/UL (ref 150–450)
POTASSIUM SERPL-SCNC: 3.9 MMOL/L (ref 3.4–5.3)
RBC # BLD AUTO: 2.99 10E6/UL (ref 3.8–5.2)
SODIUM SERPL-SCNC: 139 MMOL/L (ref 135–145)
WBC # BLD AUTO: 15.6 10E3/UL (ref 4–11)

## 2024-10-18 PROCEDURE — 250N000011 HC RX IP 250 OP 636: Performed by: SURGERY

## 2024-10-18 PROCEDURE — 85014 HEMATOCRIT: CPT | Performed by: STUDENT IN AN ORGANIZED HEALTH CARE EDUCATION/TRAINING PROGRAM

## 2024-10-18 PROCEDURE — 250N000009 HC RX 250: Performed by: COLON & RECTAL SURGERY

## 2024-10-18 PROCEDURE — 250N000013 HC RX MED GY IP 250 OP 250 PS 637: Performed by: COLON & RECTAL SURGERY

## 2024-10-18 PROCEDURE — 258N000003 HC RX IP 258 OP 636: Performed by: SURGERY

## 2024-10-18 PROCEDURE — 250N000009 HC RX 250: Performed by: SURGERY

## 2024-10-18 PROCEDURE — 120N000002 HC R&B MED SURG/OB UMMC

## 2024-10-18 PROCEDURE — 250N000013 HC RX MED GY IP 250 OP 250 PS 637: Performed by: SURGERY

## 2024-10-18 PROCEDURE — 84100 ASSAY OF PHOSPHORUS: CPT | Performed by: COLON & RECTAL SURGERY

## 2024-10-18 PROCEDURE — 250N000009 HC RX 250: Performed by: STUDENT IN AN ORGANIZED HEALTH CARE EDUCATION/TRAINING PROGRAM

## 2024-10-18 PROCEDURE — G0463 HOSPITAL OUTPT CLINIC VISIT: HCPCS

## 2024-10-18 PROCEDURE — 97110 THERAPEUTIC EXERCISES: CPT | Mod: GP

## 2024-10-18 PROCEDURE — 250N000011 HC RX IP 250 OP 636

## 2024-10-18 PROCEDURE — 99232 SBSQ HOSP IP/OBS MODERATE 35: CPT | Mod: 24 | Performed by: PHYSICIAN ASSISTANT

## 2024-10-18 PROCEDURE — 250N000013 HC RX MED GY IP 250 OP 250 PS 637: Performed by: STUDENT IN AN ORGANIZED HEALTH CARE EDUCATION/TRAINING PROGRAM

## 2024-10-18 PROCEDURE — B4185 PARENTERAL SOL 10 GM LIPIDS: HCPCS | Performed by: COLON & RECTAL SURGERY

## 2024-10-18 PROCEDURE — 80048 BASIC METABOLIC PNL TOTAL CA: CPT | Performed by: COLON & RECTAL SURGERY

## 2024-10-18 PROCEDURE — 250N000011 HC RX IP 250 OP 636: Performed by: STUDENT IN AN ORGANIZED HEALTH CARE EDUCATION/TRAINING PROGRAM

## 2024-10-18 PROCEDURE — 83735 ASSAY OF MAGNESIUM: CPT | Performed by: COLON & RECTAL SURGERY

## 2024-10-18 PROCEDURE — 250N000009 HC RX 250

## 2024-10-18 RX ORDER — LIDOCAINE 4 G/G
1 PATCH TOPICAL
Status: DISCONTINUED | OUTPATIENT
Start: 2024-10-18 | End: 2024-10-28 | Stop reason: HOSPADM

## 2024-10-18 RX ORDER — LIDOCAINE 40 MG/G
CREAM TOPICAL EVERY 4 HOURS PRN
Status: COMPLETED | OUTPATIENT
Start: 2024-10-18 | End: 2024-10-24

## 2024-10-18 RX ADMIN — MAGNESIUM SULFATE HEPTAHYDRATE: 500 INJECTION, SOLUTION INTRAMUSCULAR; INTRAVENOUS at 20:13

## 2024-10-18 RX ADMIN — KETOROLAC TROMETHAMINE 30 MG: 30 INJECTION, SOLUTION INTRAMUSCULAR; INTRAVENOUS at 17:03

## 2024-10-18 RX ADMIN — HYDROMORPHONE HYDROCHLORIDE 1 MG: 1 INJECTION, SOLUTION INTRAMUSCULAR; INTRAVENOUS; SUBCUTANEOUS at 06:18

## 2024-10-18 RX ADMIN — BACLOFEN 20 MG: 10 TABLET ORAL at 22:33

## 2024-10-18 RX ADMIN — BACLOFEN 20 MG: 10 TABLET ORAL at 17:03

## 2024-10-18 RX ADMIN — ACETAMINOPHEN 1000 MG: 325 SOLUTION ORAL at 17:03

## 2024-10-18 RX ADMIN — PROCHLORPERAZINE EDISYLATE 10 MG: 5 INJECTION INTRAMUSCULAR; INTRAVENOUS at 04:54

## 2024-10-18 RX ADMIN — PIPERACILLIN AND TAZOBACTAM 3.38 G: 3; .375 INJECTION, POWDER, LYOPHILIZED, FOR SOLUTION INTRAVENOUS at 18:22

## 2024-10-18 RX ADMIN — INSULIN ASPART 2 UNITS: 100 INJECTION, SOLUTION INTRAVENOUS; SUBCUTANEOUS at 09:33

## 2024-10-18 RX ADMIN — ACETAMINOPHEN 1000 MG: 325 SOLUTION ORAL at 09:56

## 2024-10-18 RX ADMIN — PROMETHAZINE HYDROCHLORIDE 25 MG: 25 INJECTION, SOLUTION INTRAMUSCULAR; INTRAVENOUS at 15:14

## 2024-10-18 RX ADMIN — Medication 60 MG: at 20:12

## 2024-10-18 RX ADMIN — OXYCODONE HYDROCHLORIDE 5 MG: 5 SOLUTION ORAL at 09:55

## 2024-10-18 RX ADMIN — ACETAMINOPHEN 1000 MG: 325 SOLUTION ORAL at 04:39

## 2024-10-18 RX ADMIN — PROMETHAZINE HYDROCHLORIDE 25 MG: 25 INJECTION, SOLUTION INTRAMUSCULAR; INTRAVENOUS at 20:14

## 2024-10-18 RX ADMIN — INSULIN GLARGINE 3 UNITS: 100 INJECTION, SOLUTION SUBCUTANEOUS at 17:04

## 2024-10-18 RX ADMIN — OXYCODONE HYDROCHLORIDE 10 MG: 5 SOLUTION ORAL at 18:29

## 2024-10-18 RX ADMIN — ENOXAPARIN SODIUM 40 MG: 40 INJECTION SUBCUTANEOUS at 17:03

## 2024-10-18 RX ADMIN — INSULIN ASPART 2 UNITS: 100 INJECTION, SOLUTION INTRAVENOUS; SUBCUTANEOUS at 15:10

## 2024-10-18 RX ADMIN — LEVOTHYROXINE SODIUM 100 MCG: 20 INJECTION, SOLUTION INTRAVENOUS at 10:09

## 2024-10-18 RX ADMIN — PIPERACILLIN AND TAZOBACTAM 3.38 G: 3; .375 INJECTION, POWDER, LYOPHILIZED, FOR SOLUTION INTRAVENOUS at 01:55

## 2024-10-18 RX ADMIN — CYCLOBENZAPRINE HYDROCHLORIDE 10 MG: 5 TABLET, FILM COATED ORAL at 04:39

## 2024-10-18 RX ADMIN — SMOFLIPID 250 ML: 6; 6; 5; 3 INJECTION, EMULSION INTRAVENOUS at 20:12

## 2024-10-18 RX ADMIN — HYDROMORPHONE HYDROCHLORIDE 1 MG: 1 INJECTION, SOLUTION INTRAMUSCULAR; INTRAVENOUS; SUBCUTANEOUS at 02:23

## 2024-10-18 RX ADMIN — PROMETHAZINE HYDROCHLORIDE 25 MG: 25 INJECTION, SOLUTION INTRAMUSCULAR; INTRAVENOUS at 09:49

## 2024-10-18 RX ADMIN — BACLOFEN 20 MG: 10 TABLET ORAL at 10:52

## 2024-10-18 RX ADMIN — DOXAZOSIN 1 MG: 4 TABLET ORAL at 09:31

## 2024-10-18 RX ADMIN — INSULIN ASPART 2 UNITS: 100 INJECTION, SOLUTION INTRAVENOUS; SUBCUTANEOUS at 02:15

## 2024-10-18 RX ADMIN — PANTOPRAZOLE SODIUM 40 MG: 40 INJECTION, POWDER, FOR SOLUTION INTRAVENOUS at 09:40

## 2024-10-18 RX ADMIN — PIPERACILLIN AND TAZOBACTAM 3.38 G: 3; .375 INJECTION, POWDER, LYOPHILIZED, FOR SOLUTION INTRAVENOUS at 10:51

## 2024-10-18 RX ADMIN — PROCHLORPERAZINE EDISYLATE 10 MG: 5 INJECTION INTRAMUSCULAR; INTRAVENOUS at 12:40

## 2024-10-18 RX ADMIN — PANCRELIPASE 3 CAPSULE: 120000; 24000; 76000 CAPSULE, DELAYED RELEASE PELLETS ORAL at 18:22

## 2024-10-18 RX ADMIN — ACETAMINOPHEN 1000 MG: 325 SOLUTION ORAL at 22:34

## 2024-10-18 RX ADMIN — PANCRELIPASE 3 CAPSULE: 120000; 24000; 76000 CAPSULE, DELAYED RELEASE PELLETS ORAL at 11:48

## 2024-10-18 RX ADMIN — HYDROMORPHONE HYDROCHLORIDE 1 MG: 1 INJECTION, SOLUTION INTRAMUSCULAR; INTRAVENOUS; SUBCUTANEOUS at 20:34

## 2024-10-18 RX ADMIN — HYDROMORPHONE HYDROCHLORIDE 1 MG: 1 INJECTION, SOLUTION INTRAMUSCULAR; INTRAVENOUS; SUBCUTANEOUS at 13:23

## 2024-10-18 RX ADMIN — Medication 50 MG: at 22:33

## 2024-10-18 ASSESSMENT — ACTIVITIES OF DAILY LIVING (ADL)
ADLS_ACUITY_SCORE: 32
ADLS_ACUITY_SCORE: 30
ADLS_ACUITY_SCORE: 32
ADLS_ACUITY_SCORE: 31
ADLS_ACUITY_SCORE: 32
ADLS_ACUITY_SCORE: 31
ADLS_ACUITY_SCORE: 32
ADLS_ACUITY_SCORE: 31
ADLS_ACUITY_SCORE: 31
ADLS_ACUITY_SCORE: 30
ADLS_ACUITY_SCORE: 30
ADLS_ACUITY_SCORE: 32
ADLS_ACUITY_SCORE: 32
ADLS_ACUITY_SCORE: 30
ADLS_ACUITY_SCORE: 32
ADLS_ACUITY_SCORE: 30
ADLS_ACUITY_SCORE: 32

## 2024-10-18 NOTE — PROGRESS NOTES
Inpatient Diabetes Management Service: Daily Progress Note     HPI: Dinora Mcghee is a 59 year old with pancreatogenic diabetes s/p pancreatectomy and TPAIT in 2016,complicated by gastroparesis, as well as a comorbid history of HTN,HLD, Hypothyroidism, chronic nausea/and constipation, chronic malnutrition and TPN and Tube feeds dependent , who was admitted on 10/11/2024 for exploratory laparotomy, extensive lysis of adhesions, revision of J tube,and small bowel resection. Inpatient Diabetes Service consulted for management of diabetes in the setting of continuous TPN use and expected TF use in future. G and J tube on gravity          Assessment/Plan:     Assessment:   Pancreatogenic diabetes s/p pancreatectomy and TPAIT in 2016,went off insulin for about 4 years and now back on insulin , A1C-5.4% in 7/23/2024  S/p exploratory laparotomy, extensive lysis of adhesions, revision of J tube,and small bowel resection on 10/11/24  Chronic malnutrition, was on TPN X12 hour, 3 days/week and Tube feeds x 12 hours, 7 days/week at home, now tube feeds are off, TPN continuous  Poor oral intake  Continuous TPN related hyperglycemia  Gastroparesis    Plan/Recommendations:   - Increase regular insulin 10 units with continuous TPN dextrose at 200 g (goal) = 0.05 units/gram dextrose.   - Continue Lantus 3 units q 24 hrs for 24 hour at 1300.  - Increase Novolog carb coverage: 1 unit per 15 g cho TID AC and prn snacks/supplements  - Increase Novolog Correction Scale: high resistance (ISF: 1:25) every 4 hours, starting at supper.  - BG monitoring: Every 4 hours.  - Hypoglycemia protocol    - Carb counting protocol    Discussion: BG trending higher since TPN dextrose increased to 200 grams and added 8 units of regular insulin and BG still running high.  Add more regular insulin and increase correction scale and has regular diet so increase carb coverage.. Keeping BG checks and correction q4hr since minimal PO  intake and currently NPO.    Discharge Planning: (tentative)  Medications: TBD  Test Claims: TBD  Education:  Needs to be assessed closer to discharge.   Outpatient Follow-up:  recommend [facility] Endocrinology vs PCP     Please notify Inpatient Diabetes Service if changes are planned to steroids, nutrition, TPN/TF and anticipated procedures requiring prolonged NPO status.         Interval History/Review of Systems :   - The last 24 hours progress and nursing notes reviewed.  Still having pain.  No emesis still this admission.  She is hungry and will eat when they add back her creon.   - Wearing Yandy 3 CGM.     Planned Procedures/Surgeries: 10/17 CT A/P with IV and enteral contrast via G-tube.      Inpatient Glucose Control:       Recent Labs   Lab 10/18/24  0519 10/18/24  0204 10/17/24  2204 10/17/24  1834 10/17/24  1442 10/17/24  1004   * 197* 151* 193* 171* 234*             Medications Impacting Glycemia:   Steroids: None  D5W containing solutions/medications: None  Other medications impacting glucose: NOne         Nutrition:   Orders Placed This Encounter      Regular Diet Adult  Prior to admission: was on TPN on Sunday, Tuesday and Thursday nights.  Also on enteral feeds  Supplements: none   TF: none. Was on 10/15/2024:  Amy Glass Peptide 1.5 (plain) @ 10 mL/hr via J-tube (only ran PM 10/15- 6AM 10/16).   TPN:  Continuous: dextrose 140 g (10/13) -->  dextrose 170 g (10/14-10/15) --> dextrose increased to 200 g (10/17... current)        Diabetes History: see full consult note for complete diabetes history   Diabetes Type and Duration: Since 2016, s/p pancreatectomy and TPAI     PTA Medication Regimen: Takes insulin levemir 3 units daily and novolog 1 unit if BG is 175-275, and 2 units if >275 up to every 4 hours as needed. Reports some times she keeps on  giving correction whole night but other times does it once or not require it all.  Historical Diabetes Medications: As above     Glucose monitoring  "device and frequency: Checks with jim 3  Outpatient Diabetes Provider: None        Physical Exam:   /65 (BP Location: Right arm)   Pulse 99   Temp 98.1  F (36.7  C) (Oral)   Resp 18   Ht 1.727 m (5' 8\")   Wt 61.2 kg (135 lb)   SpO2 98%   BMI 20.53 kg/m    General:  well appearing, NAD, resting comfortably in chair.  Lungs: unlabored breathing on RA.  Mental Status:  Alert and oriented x3  Psych:  calm, cooperative, normal mood and affect          Data:     Lab Results   Component Value Date    A1C 5.4 07/23/2024    A1C 5.6 03/20/2024    A1C 5.4 02/17/2022    A1C 5.2 11/27/2021    A1C 5.5 09/18/2021    A1C 5.4 08/05/2021    A1C 5.7 05/12/2021    A1C 5.9 (A) 04/01/2021    A1C 5.5 12/17/2020    A1C 5.8 (H) 07/30/2020       ROUTINE IP LABS (Last four results)  BMP  Recent Labs   Lab 10/18/24  0519 10/18/24  0204 10/17/24  2204 10/17/24  1834 10/17/24  0525 10/17/24  0435 10/16/24  1011 10/16/24  0634 10/15/24  0603 10/15/24  0549     --   --   --   --  141  --  137  --  136   POTASSIUM 3.9  --   --   --   --  3.9  --  3.7  --  3.7   CHLORIDE 101  --   --   --   --  103  --  99  --  97*   KASSI 9.1  --   --   --   --  8.6*  --  8.9  --  8.4*   CO2 26  --   --   --   --  27  --  29  --  31*   BUN 17.3  --   --   --   --  14.9  --  12.6  --  12.6   CR 0.57  --   --   --   --  0.49*  --  0.53  --  0.51   * 197* 151* 193*   < > 174*   < > 214*   < > 180*    < > = values in this interval not displayed.     CBC  Recent Labs   Lab 10/18/24  0519 10/17/24  0647 10/16/24  0634 10/15/24  0549   WBC 15.6* 15.3* 16.2* 12.7*   RBC 2.99* 2.67* 2.77* 2.88*   HGB 9.0* 8.1* 8.4* 8.6*   HCT 28.0* 25.2* 26.3* 27.6*   MCV 94 94 95 96   MCH 30.1 30.3 30.3 29.9   MCHC 32.1 32.1 31.9 31.2*   RDW 13.1 12.9 12.9 13.0   * [phone]     Inpatient Diabetes Service will continue to follow, please don't hesitate to contact the team with any questions or concerns.     Maria Elena Wallace PA-C  Inpatient Diabetes " Service  Pager: [phone]  Available on vocera    Plan discussed with patient, bedside RN, and primary team via this note.    To contact Inpatient Diabetes Service:     7 AM - 5 PM: Page the IDS GIGI following the patient that day (see filed or incomplete progress notes/consult notes under Endocrinology)    OR if uncertain of provider assignment: page job code [phone]    5 PM - 7 AM: First call after hours is to primary service.    For urgent after-hours questions, page job code for on call fellow: [phone]     I spent a total of 40 minutes on the date of the encounter doing prep/post-work, chart review, history and exam, documentation and further activities per the note including lab review, multidisciplinary communication, counseling the patient and/or coordinating care regarding acute hyper/hypoglycemic management, as well as discharge management and planning/communication.

## 2024-10-18 NOTE — PROGRESS NOTES
"Brief Surgery Crossover Note    Patient reports significant pain around her J tube site and describes a burning sensation due to raw skin from leakage onto skin, minimal relief with topical barrier cream. She says the pain keeps her up at night and lidocaine cream is effective for her at home in controlling the pain around her J tube site. Denies increasing abdominal pain with liquid diet, she has not consumed full liquids yet. She denies N/V, chest pain or shortness of breath. Patient had recent CT scan to verify correct J tube placement.     /65 (BP Location: Right arm)   Pulse 101   Temp 98.4  F (36.9  C) (Oral)   Resp 18   Ht 1.727 m (5' 8\")   Wt 61.2 kg (135 lb)   SpO2 98%   BMI 20.53 kg/m      General: alert and oriented, in no acute distress  CV: regular rate and rhythm, palpable peripheral pulses, no edema   Resp: no increased work of breathing   GI: soft, non-distended, tender to palpation in RLQ and LLQ, no guarding or rigidity, ileostomy with liquid output, J tube with bilious output, erythema and barrier cream present at J tube insertion site   Extremities: no significant pitting edema     -lidocaine cream in addition to barrier cream around J tube for pain relief as this has been effective for the patient at home  -reduced lidocaine patch to 1 patch from 2, patient reports that lidocaine patches are not very effective for her abdominal pain and to minimize exceeding maximum dose     Herminia Garza MD  General Surgery PGY-1    **For on call schedules and contact information, please refer to [facility]**      "

## 2024-10-18 NOTE — PROGRESS NOTES
Colorectal Surgery Progress Note  [facility]  POD#7      Subjective:  Burning pain around J tube site due to leaking. No abdominal pain. No fever/chills. No nausea/vomiting. On TPN and FLD for comfort. Voiding independently, passing gas, last BM overnight.    Vitals:  Vitals:    10/18/24 0212 10/18/24 0633 10/18/24 0752 10/18/24 1144   BP: 120/65 135/78 113/65 109/66   BP Location: Right arm Right arm Right arm Right arm   Pulse: 101 93 99 100   Resp: 18 18 18 18   Temp: 98.4  F (36.9  C) 98.2  F (36.8  C) 98.1  F (36.7  C) 98.2  F (36.8  C)   TempSrc: Oral Oral Oral Oral   SpO2: 98% 97% 98% 100%   Weight:    60.9 kg (134 lb 3.2 oz)   Height:         I/O:  I/O last 3 completed shifts:  In: 980 [I.V.:250; NG/GT:250]  Out: 3475 [Urine:1450; Emesis/NG output:1200; Stool:825]    Physical Exam:  Gen: AAOx3, NAD  Pulm: Non-labored breathing  Abd: Soft, non-distended, appropriately tender, no guarding   Incision C/D/I   Stoma pink and viable with stool and gas in bag   G tube in place, no leakage.   J tube in place. Mild leakage around insertion site with eroded skin below.  Ext:  Warm and well-perfused    BMP  Recent Labs   Lab 10/18/24  1142 10/18/24  0932 10/18/24  0519 10/18/24  0204 10/17/24  0525 10/17/24  0435 10/16/24  1011 10/16/24  0634 10/15/24  0603 10/15/24  0549   NA  --   --  139  --   --  141  --  137  --  136   POTASSIUM  --   --  3.9  --   --  3.9  --  3.7  --  3.7   CHLORIDE  --   --  101  --   --  103  --  99  --  97*   CO2  --   --  26  --   --  27  --  29  --  31*   BUN  --   --  17.3  --   --  14.9  --  12.6  --  12.6   CR  --   --  0.57  --   --  0.49*  --  0.53  --  0.51   * 223* 192* 197*   < > 174*   < > 214*   < > 180*   MAG  --   --  2.1  --   --  2.0  --  1.8  --  1.7   PHOS  --   --  3.8  --   --  3.2  --  3.0  --  3.4    < > = values in this interval not displayed.     CBC  Recent Labs   Lab 10/18/24  0519 10/17/24  0647 10/16/24  0634 10/15/24  0549    WBC 15.6* 15.3* 16.2* 12.7*   HGB 9.0* 8.1* 8.4* 8.6*   HCT 28.0* 25.2* 26.3* 27.6*   * [phone]         ASSESSMENT: This is a 59 year old female on POD#7 from Total Abdominal Colectomy, Ileorectal Anastomosis and Diverting Loop Ileostomy for slow transit constipation. CT scan showed J tube in good position, not concerning. WBC 15.6 today from 15.3 yesterday; on Zosyn. Having good colostomy output.    Today:  - WOCN consult to evaluate J Tube leakage and management of skin erosion  - Nothing per J tube. Gravity drainage.  - G tube for decompression. Ok to give meds via G tube  - Continue Zosyn  - Regular Diet for comfort  - Continue TPN      Neuro/Pain: Tylenol, baclofen, flexeril, enzo, oxy, dilaudid  CV: N/A  PULM: encourage IS.  GI/FEN: TPN. Reg Diet for comfort  : Voiding independently  Heme: N/A  ID: Follow WBC. Continue Zosyn  Endocrine: SSI  Other: N/A  Lines: G Tube for decompression. Ok to give meds via G Tube.   J tube to gravity. Nothing via J tube. WOCN to evaluate for leakage.  Activity: as tolerated.  Ppx: LVNX  Dispo: Pending WBC, J tube. Possible home on Monday.    Clinically Significant Risk Factors               # Hypoalbuminemia: Lowest albumin = 3.1 g/dL at 10/14/2024  7:34 AM, will monitor as appropriate                # Moderate Malnutrition: based on nutrition assessment    # Financial/Environmental Concerns: none         Keith Hernandez MD  General Surgery PGY-1  Pager: [phone]

## 2024-10-18 NOTE — PROGRESS NOTES
CLINICAL NUTRITION SERVICES - REASSESSMENT NOTE     Nutrition Prescription    RECOMMENDATIONS FOR MDs/PROVIDERS TO ORDER:  Please alert RD, pharmacist, and Endocrine if/when plan to start cycling TPN  Please re-consult RD if/when plan to resume TF, and please alert Endo as well    Malnutrition Status:    Moderate malnutrition in the context of chronic illness    Recommendations already ordered by Registered Dietitian (RD):  No changes to TPN at this time    Future/Additional Recommendations:  Monitor ongoing TPN tolerance, wt trends, future TF plans per team; inpatient RD will continue to follow per protocol     EVALUATION OF THE PROGRESS TOWARD GOALS   Diet: Regular    Nutrition Support: TPN: 1440 mL/day (60 mL/hr continuous) with 200 g dextrose, 100 g AA, and 250 mL 20% IV SMOFlipid daily to provide 1580 kcal (25 kcal/kg), 1.6 g/kg PRO, GIR 2.2, and 32% kcal from fat per dosing weight   -- Insulin in TPN started yesterday, 8 units regular insulin    Nutrition Support Intake: Continuous TPN started 10/12 with initial dextrose 140 g/day, increased to 170 g dex on 10/14, and increased to goal dex 200 g on 10/16.  Pt started on trickle TF ([place] Peptide 1.5 @ 10 mL/hr + Relizorb) on 10/15, but this was discontinued on 10/16 due to not tolerating TF.  No documentation of any TPN/lipid interruptions the past week from what is ordered per review of progress notes and MAR.       PO Intake: Pt was NPO 10/11-10/13, CLD 10/13-10/16, FLD 10/16-10/18, and advanced to regular diet this morning    NEW FINDINGS   Weight: 4 lb wt loss the past week (2.9%), some of this could be fluid related and/or scale discrepancy. Wt is back to weights from Aug 2014 (~2 months ago)  Date/Time Weight Weight Method   10/17/24 0900 61.2 kg (135 lb) Standing scale   10/16/24 0809 61 kg (134 lb 6.4 oz) Standing scale   10/14/24 0759 61.3 kg (135 lb 3.2 oz) Standing scale   10/13/24 0739 61.6 kg (135 lb 12.8 oz) Standing scale   10/11/24 1105  62.7 kg (138 lb 3.7 oz) --     PTA weight trends  Wt Readings from Last 10 Encounters:   10/17/24 61.2 kg (135 lb)   10/08/24 66.5 kg (146 lb 9.6 oz)   09/25/24 63 kg (139 lb)   09/11/24 63.5 kg (139 lb 15.9 oz)   08/26/24 61.2 kg (135 lb)   08/12/24 61.2 kg (135 lb)   07/24/24 58 kg (127 lb 13.9 oz)   07/22/24 56.7 kg (125 lb)   06/05/24 57.2 kg (126 lb)   05/30/24 56.2 kg (124 lb)      Dosing weight: 61 kg (actual)    ASSESSED NUTRITION NEEDS  Estimated Energy Needs: 1525- 1830 kcals/day (25 - 30 kcals/kg)  Justification: Maintenance  Estimated Protein Needs: 73- 92 grams protein/day (1.2 - 1.5 grams of pro/kg)  Justification: Post-op  Estimated Fluid Needs:  (1 mL/kcal)   Justification: Maintenance, or other per provider pending fluid status    Labs:   - K+, Mg++, and Phos WNL  - BGs 150s-190s overnight; Endo following  - (10/14): Alk Phos, ALT, AST, and Tbili WNL  - (10/17): TG WNL    Meds:  - Novolog, 1 unit per 20 g CHO TID with breakfast, lunch, and dinner  - Novolog, medium sliding scale insulin q4h  - Lantus (3 units daily)  - Creon 24, 3-4 capsules TID with meals (8460-7751 units lipase/kg/meal)    GI:   - J-tube site with pain and burning sensation due to raw skin from leakage onto skin per provider note overnight  - G-tube to gravity   - Ileostomy output: 925 mL on 10/17, 975 mL on 10/16, 370 mL on 10/15, 125 mL on 10/14, 250 mL on 10/13, 100 mL on 10/12, and no output documented on 10/11  - Last BM 10/18 per RN note this morning      MALNUTRITION  % Intake: Decreased intake does not meet criteria  % Weight Loss: None noted  Subcutaneous Fat Loss: Facial region and Lower arm:  mild per visual  Muscle Loss: Temporal and Facial & jaw region:  mild per visual  Fluid Accumulation/Edema: None noted per chart review  Malnutrition Diagnosis: Moderate malnutrition in the context of chronic illness    Previous Goals   Total avg nutritional intake to meet a minimum of 25 kcal/kg and 1.2 g PRO/kg daily (per dosing  wt 63 kg admit wt).   Evaluation: Not met    Previous Nutrition Diagnosis  Inadequate oral intake related to altered GI + post op status as evidenced by TPN needs    Evaluation: Improving    CURRENT NUTRITION DIAGNOSIS  Inadequate oral intake related to on diet for comfort with G-tube to gravity as evidenced by on TPN for nutrition support while on diet for comfort with G-tube to gravity      INTERVENTIONS  Implementation  Collaboration with other providers - discussed no TPN changes with pharmacist from RD perspective  Nutrition Education - plan to continue TPN for full nutrition for now until team plans to resume TF, pt aware.  Pt had some questions about logistics of when Creon can be brought to her room before her meal times, writer asked RN to touch base with pt about that    Goals  Total avg nutritional intake to meet a minimum of 25 kcal/kg and 1.2 g PRO/kg daily (per dosing wt 61 kg).    Monitoring/Evaluation  Progress toward goals will be monitored and evaluated per protocol.     Magi Estrella RD, , LD  Weekday Units covered: 5A (non-Heme Onc pts) and 7B ([id number])  Available by 5A or 7B Clinical Dietmica Olivo  Weekend Coverage: Weekend Clinical Keith Olivo    Inpatient Clinical Dietitians no longer available via paging

## 2024-10-18 NOTE — PLAN OF CARE
[id number]. Alert, oriented, and up with stand-by assistance. Tachycardic; otherwise vitally stable on room air. Abdominal pain managed with scheduled Tylenol and PRN Dilaudid and oxycodone. NPO except for meds, but then advanced to full liquid diet ~1600. Blood glucose checks Q4H with sliding scale and CHO coverage. Intermittent nausea with no emesis. Voiding spontaneously. RLQ colostomy with moderate liquid output. LUQ J-tube clamped; leaking at site; dressing changed x 1. RUQ G-tube to gravity drainage; irrigated and clamped after medication administration. All meds given via G-tube. R double lumen CVC infusing continuous TPN.

## 2024-10-18 NOTE — PROGRESS NOTES
"[facility] Nurse Inpatient Assessment     Consulted for: Diverting loop ileostomy  10/18: new consult for J tube leakage  Patient History (according to provider note(s):      \"60 y/o female has a past medical history of Allergic rhinitis, cause unspecified, Allergy to other foods, Arthritis, Choledocholithiasis, Chronic abdominal pain, Chronic constipation, Chronic nausea, Chronic pancreatitis (H), Degeneration of lumbar or lumbosacral intervertebral disc, Esophageal reflux, Gastroparesis, Hiatal hernia, History of pituitary adenoma, Hypothyroidism, Migraines, Mild hyperlipidemia, On tube feeding diet, Pancreatic disease, PONV (postoperative nausea and vomiting), Portacath in place, Type 1 diabetes mellitus without complication (H) (5/9/2022), and Unspecified hearing loss.\"     Assessment:      Tube type and location:  J tube in LUQ    Last photo 10/18/24  History: placed in OR 10/11/24.  10/17/24 CT scan showed J tube in good position   Current Tube Securement: bumper sutured to skin   16 Fr tube with 1 lumens   Leakage around tube: small  Description of leakage/drainage:  bilious  Insertion site assessment:  approximately 0.2 cm gap between the tube and skin  Peritubular skin assessment:  bright red Erythema extending up to 0.4 cm from insertion site       Palpation of the wound bed: normal      Wound Drainage: none     Description of drainage: none  ?? Temperature? normal   Pain: moderate, burning  Pain interventions prior to dressing change: patient tolerated well  STATUS: initial assessment  Supplies ordered: gathered       Assessment of new end Ileostomy:  Diagnosis Pertinent to Stoma:  Slow transit constipation       Surgery Date: 10/11/24  Sur[facility]: [facility]  Pouching system in place on assessment today: Leandro two piece, cut to fit, flat, and barrier ring   Pouch barrier status: intact  Pouch last changed/wear time: 1 day  Reason " for pouch change today: pouch not changed today  Effectiveness of current pouching/ supply plan: Effective  Change made with ostomy management today: No  Pouching system in place after visit today: Leandro 2 piece flat cut to fit with barrier ring    Supplies: gathered, at bedside, supplies stored on unit, and discussed with patient     Stoma location: RLQ  Stoma size: 1 1/4 inches  Stoma appearance: red, round, moist, edematous, and protruberant  Mucocutaneous junction:  intact  Peristomal complication(s): none  Output: watery and yellow  Output volume emptied during visit: 100 ml  Abdominal assessment: Firm  Surgical site(s): open to air and staples intact  NG still in place? No  Pain: Sharp  Is patient still on a PCA? No    Ostomy education assessment:  Participant of teaching session today: patient   Education completed today: Pouching system assessment , Pouch change return demonstration, Pouch emptying return demonstration, Importance of hydration, When to seek medical attention, Odor/flatus management , Infection prevention/hygiene , Hernia prevention, Lifestyle adjustments , and Discharge instructions  Educational materials/methods: Verbal, Written, FOD [place] education hand out, and Addressed patient/caregiver questions/concerns  Education still needed: Discharge instructions  Learning Comprehension:   Psychosocial assessment: is a retired  and has had to manage TPN, drains at home with home care assist   Patient readiness for education today: attentive and active participation  Following visit: patient  is able to demonstrate;         1. How to empty their pouch? Yes and Independent        2. How to change their pouch? Yes and with minimal assist        3. How to read and record intake and output correctly? Yes and Demo provided   Preparation for discharge completed: Placed prescription recommendations in discharge navigator for MD to sign, Ensured patient has extra supplies for discharge,  "Discussed how to order supplies after discharge , Ordered samples from  after gaining consent from patient/caregiver, Discussed how and when to make an outpatient WOC nurse appointment after discharge, Prepared for discharge home with home care, and Discuss signs/symptoms of when to seek medical attention  Preparation for discharge still needed: none  Pt support system on discharge: spouse  WOC recommend home care? Yes  Face to face time: 45 minutes    Treatment Plan:     J tube site:    Pouching system:  2 piece flat 57 mm barrier with urostomy pouch  -cuauhtemoc barrier ring under the barrier.   J tube left open and draining into the urostomy pouch    Change if leaks using the same supplies but cut the barrier to the size of the bumper.  If remains intact, to be changed by WOC on Monday    RLQ Ileostomy pouching plan:   Pouching system: ostomy supplies pouches: Leandro 57 FECAL ([id number]) ostomy supplies barrier: Souris 57mm FLAT ([id number])  Accessories used: WOC ostomy accessories: 2\" Cera Barrier Ring ([id number])   Frequency of pouch changes: PRN leakage and Three times a week  WOC follow up plan: Twice weekly  Bedside RN interventions: Change pouch PRN if leaking using the supplies above, Empty pouch when 1/3 to 1/2 full, ensure to clean pouch outlet after emptying to prevent odor, Notify WOC for ongoing pouch leakage, Document stoma appearance and output volume, color, and consistency every shift, and Encourage patient to empty pouch with assist     Outpatient plan:  Request the following supplies:      Souris    2 piece flat wafer 57mm  # [id number]                           2 piece opaque Fecal pouch without Filter 57mm- # [id number]  Accessories  2  barrier ring #8805     Adapt powder #1348    No sting film barrier # 7345                           Adapt universal adhesive remover #9230                          Adapt M-9 Spray room deodorizer #9544                           Souris barrier extenders #62405   "                               Change your pouch 2 times a week, more often if leaking.    DATA:     Current support surface: Standard  Standard gel mattress (Isoflex)  Containment of urine/stool: Ileostomy pouch  BMI: Body mass index is 20.41 kg/m .   Active diet order: Orders Placed This Encounter      Regular Diet Adult     Output: I/O last 3 completed shifts:  In: 705 [I.V.:355; NG/GT:350]  Out: 3280 [Urine:1600; Emesis/NG output:1180; Stool:500]     Labs:   Recent Labs   Lab 10/18/24  0519 10/15/24  0549 10/14/24  0734 10/13/24  0606   ALBUMIN  --   --  3.1* 3.2*   PREALB  --   --   --  8.1*   HGB 9.0*   < > 9.2* 9.3*   INR  --   --  1.01 1.20*   WBC 15.6*   < > 14.1* 14.8*    < > = values in this interval not displayed.     Pressure injury risk assessment:   Sensory Perception: 4-->no impairment  Moisture: 3-->occasionally moist  Activity: 3-->walks occasionally  Mobility: 3-->slightly limited  Nutrition: 1-->very poor  Friction and Shear: 1-->problem  Martinez Score: 15

## 2024-10-18 NOTE — PLAN OF CARE
[id number]    Pt is A&O x4, VSS on room air. Pt reports pain in the J tube site. Noticeable bile leaking noted that required dressing changes through out shift. Barrier cream at J tube site improved pts pain. Pt also reports pain radiated to her back- PRN Dilaudid, Oxy, Flexeril, lidocaine patch, and Toradol given with temporary relief. Pt is UAL. Pt is voiding spontaneously, LBM 10/18 via colostomy, and passing gas. Pt is tolerating full liquid diet, with intermittent nausea- prn compazine given with improvement. CVC is infusing TPN and lipids through purple and TKO in red.       Goal Outcome Evaluation:      Plan of Care Reviewed With: patient    Overall Patient Progress: no changeOverall Patient Progress: no change    Outcome Evaluation: Pt has increased pain at J tube site with burning from leakage. Barrier cream applied and multiple dressing changes.

## 2024-10-19 ENCOUNTER — APPOINTMENT (OUTPATIENT)
Dept: PHYSICAL THERAPY | Facility: CLINIC | Age: 59
End: 2024-10-19
Attending: COLON & RECTAL SURGERY
Payer: COMMERCIAL

## 2024-10-19 LAB
ANION GAP SERPL CALCULATED.3IONS-SCNC: 13 MMOL/L (ref 7–15)
BUN SERPL-MCNC: 16.6 MG/DL (ref 6–20)
CALCIUM SERPL-MCNC: 8.8 MG/DL (ref 8.8–10.4)
CHLORIDE SERPL-SCNC: 102 MMOL/L (ref 98–107)
CREAT SERPL-MCNC: 0.58 MG/DL (ref 0.51–0.95)
EGFRCR SERPLBLD CKD-EPI 2021: >90 ML/MIN/1.73M2
ERYTHROCYTE [DISTWIDTH] IN BLOOD BY AUTOMATED COUNT: 13.2 % (ref 10–15)
GLUCOSE BLDC GLUCOMTR-MCNC: 189 MG/DL (ref 70–99)
GLUCOSE BLDC GLUCOMTR-MCNC: 194 MG/DL (ref 70–99)
GLUCOSE BLDC GLUCOMTR-MCNC: 204 MG/DL (ref 70–99)
GLUCOSE BLDC GLUCOMTR-MCNC: 210 MG/DL (ref 70–99)
GLUCOSE BLDC GLUCOMTR-MCNC: 268 MG/DL (ref 70–99)
GLUCOSE BLDC GLUCOMTR-MCNC: 275 MG/DL (ref 70–99)
GLUCOSE BLDC GLUCOMTR-MCNC: 99 MG/DL (ref 70–99)
GLUCOSE SERPL-MCNC: 107 MG/DL (ref 70–99)
HCO3 SERPL-SCNC: 26 MMOL/L (ref 22–29)
HCT VFR BLD AUTO: 27.5 % (ref 35–47)
HGB BLD-MCNC: 8.9 G/DL (ref 11.7–15.7)
MAGNESIUM SERPL-MCNC: 2.1 MG/DL (ref 1.7–2.3)
MCH RBC QN AUTO: 30.5 PG (ref 26.5–33)
MCHC RBC AUTO-ENTMCNC: 32.4 G/DL (ref 31.5–36.5)
MCV RBC AUTO: 94 FL (ref 78–100)
PHOSPHATE SERPL-MCNC: 4 MG/DL (ref 2.5–4.5)
PLATELET # BLD AUTO: 672 10E3/UL (ref 150–450)
POTASSIUM SERPL-SCNC: 3.8 MMOL/L (ref 3.4–5.3)
RBC # BLD AUTO: 2.92 10E6/UL (ref 3.8–5.2)
SODIUM SERPL-SCNC: 141 MMOL/L (ref 135–145)
WBC # BLD AUTO: 15.3 10E3/UL (ref 4–11)

## 2024-10-19 PROCEDURE — 120N000002 HC R&B MED SURG/OB UMMC

## 2024-10-19 PROCEDURE — 250N000013 HC RX MED GY IP 250 OP 250 PS 637: Performed by: STUDENT IN AN ORGANIZED HEALTH CARE EDUCATION/TRAINING PROGRAM

## 2024-10-19 PROCEDURE — 250N000009 HC RX 250: Performed by: STUDENT IN AN ORGANIZED HEALTH CARE EDUCATION/TRAINING PROGRAM

## 2024-10-19 PROCEDURE — 250N000011 HC RX IP 250 OP 636: Performed by: STUDENT IN AN ORGANIZED HEALTH CARE EDUCATION/TRAINING PROGRAM

## 2024-10-19 PROCEDURE — 250N000009 HC RX 250

## 2024-10-19 PROCEDURE — 250N000013 HC RX MED GY IP 250 OP 250 PS 637: Performed by: COLON & RECTAL SURGERY

## 2024-10-19 PROCEDURE — 85014 HEMATOCRIT: CPT | Performed by: STUDENT IN AN ORGANIZED HEALTH CARE EDUCATION/TRAINING PROGRAM

## 2024-10-19 PROCEDURE — 250N000013 HC RX MED GY IP 250 OP 250 PS 637: Performed by: SURGERY

## 2024-10-19 PROCEDURE — 250N000011 HC RX IP 250 OP 636

## 2024-10-19 PROCEDURE — B4185 PARENTERAL SOL 10 GM LIPIDS: HCPCS | Performed by: COLON & RECTAL SURGERY

## 2024-10-19 PROCEDURE — 250N000009 HC RX 250: Performed by: COLON & RECTAL SURGERY

## 2024-10-19 PROCEDURE — 250N000011 HC RX IP 250 OP 636: Performed by: SURGERY

## 2024-10-19 PROCEDURE — 83735 ASSAY OF MAGNESIUM: CPT | Performed by: COLON & RECTAL SURGERY

## 2024-10-19 PROCEDURE — 84100 ASSAY OF PHOSPHORUS: CPT | Performed by: COLON & RECTAL SURGERY

## 2024-10-19 PROCEDURE — 80048 BASIC METABOLIC PNL TOTAL CA: CPT | Performed by: STUDENT IN AN ORGANIZED HEALTH CARE EDUCATION/TRAINING PROGRAM

## 2024-10-19 PROCEDURE — 99232 SBSQ HOSP IP/OBS MODERATE 35: CPT | Mod: 24 | Performed by: PHYSICIAN ASSISTANT

## 2024-10-19 PROCEDURE — 97116 GAIT TRAINING THERAPY: CPT | Mod: GP

## 2024-10-19 PROCEDURE — 258N000003 HC RX IP 258 OP 636: Performed by: SURGERY

## 2024-10-19 PROCEDURE — 250N000013 HC RX MED GY IP 250 OP 250 PS 637

## 2024-10-19 RX ORDER — TRAZODONE HYDROCHLORIDE 50 MG/1
50 TABLET, FILM COATED ORAL AT BEDTIME
Status: DISCONTINUED | OUTPATIENT
Start: 2024-10-19 | End: 2024-10-28 | Stop reason: HOSPADM

## 2024-10-19 RX ORDER — HYDROXYZINE HYDROCHLORIDE 25 MG/1
50 TABLET, FILM COATED ORAL EVERY 6 HOURS PRN
Status: DISCONTINUED | OUTPATIENT
Start: 2024-10-19 | End: 2024-10-28 | Stop reason: HOSPADM

## 2024-10-19 RX ORDER — HYDROXYZINE HYDROCHLORIDE 25 MG/1
25 TABLET, FILM COATED ORAL EVERY 6 HOURS PRN
Status: DISCONTINUED | OUTPATIENT
Start: 2024-10-19 | End: 2024-10-28 | Stop reason: HOSPADM

## 2024-10-19 RX ORDER — PIPERACILLIN SODIUM, TAZOBACTAM SODIUM 3; .375 G/15ML; G/15ML
3.38 INJECTION, POWDER, LYOPHILIZED, FOR SOLUTION INTRAVENOUS EVERY 8 HOURS
Status: DISCONTINUED | OUTPATIENT
Start: 2024-10-19 | End: 2024-10-20

## 2024-10-19 RX ORDER — LORAZEPAM 1 MG/1
1 TABLET ORAL EVERY 4 HOURS PRN
Status: DISCONTINUED | OUTPATIENT
Start: 2024-10-19 | End: 2024-10-28 | Stop reason: HOSPADM

## 2024-10-19 RX ORDER — LEVOTHYROXINE SODIUM ANHYDROUS 100 UG/5ML
100 INJECTION, POWDER, LYOPHILIZED, FOR SOLUTION INTRAVENOUS DAILY
Status: DISCONTINUED | OUTPATIENT
Start: 2024-10-19 | End: 2024-10-19

## 2024-10-19 RX ADMIN — PROCHLORPERAZINE EDISYLATE 10 MG: 5 INJECTION INTRAMUSCULAR; INTRAVENOUS at 21:45

## 2024-10-19 RX ADMIN — BACLOFEN 20 MG: 10 TABLET ORAL at 15:32

## 2024-10-19 RX ADMIN — Medication 60 MG: at 20:03

## 2024-10-19 RX ADMIN — PIPERACILLIN AND TAZOBACTAM 3.38 G: 3; .375 INJECTION, POWDER, LYOPHILIZED, FOR SOLUTION INTRAVENOUS at 02:30

## 2024-10-19 RX ADMIN — INSULIN GLARGINE 3 UNITS: 100 INJECTION, SOLUTION SUBCUTANEOUS at 16:28

## 2024-10-19 RX ADMIN — KETOROLAC TROMETHAMINE 30 MG: 30 INJECTION, SOLUTION INTRAMUSCULAR; INTRAVENOUS at 14:46

## 2024-10-19 RX ADMIN — Medication 50 MG: at 21:58

## 2024-10-19 RX ADMIN — PIPERACILLIN AND TAZOBACTAM 3.38 G: 3; .375 INJECTION, POWDER, LYOPHILIZED, FOR SOLUTION INTRAVENOUS at 19:00

## 2024-10-19 RX ADMIN — HYDROXYZINE HYDROCHLORIDE 50 MG: 25 TABLET, FILM COATED ORAL at 11:04

## 2024-10-19 RX ADMIN — DOXAZOSIN 1 MG: 4 TABLET ORAL at 08:18

## 2024-10-19 RX ADMIN — PANCRELIPASE 3 CAPSULE: 120000; 24000; 76000 CAPSULE, DELAYED RELEASE PELLETS ORAL at 08:17

## 2024-10-19 RX ADMIN — ACETAMINOPHEN 1000 MG: 325 SOLUTION ORAL at 04:11

## 2024-10-19 RX ADMIN — OXYCODONE HYDROCHLORIDE 10 MG: 5 SOLUTION ORAL at 02:30

## 2024-10-19 RX ADMIN — SMOFLIPID 250 ML: 6; 6; 5; 3 INJECTION, EMULSION INTRAVENOUS at 20:03

## 2024-10-19 RX ADMIN — PROCHLORPERAZINE EDISYLATE 10 MG: 5 INJECTION INTRAMUSCULAR; INTRAVENOUS at 16:21

## 2024-10-19 RX ADMIN — ACETAMINOPHEN 1000 MG: 325 SOLUTION ORAL at 09:58

## 2024-10-19 RX ADMIN — OXYCODONE HYDROCHLORIDE 10 MG: 5 SOLUTION ORAL at 21:58

## 2024-10-19 RX ADMIN — BACLOFEN 10 MG: 10 TABLET ORAL at 20:03

## 2024-10-19 RX ADMIN — LEVOTHYROXINE SODIUM 100 MCG: 20 INJECTION, SOLUTION INTRAVENOUS at 08:18

## 2024-10-19 RX ADMIN — LORAZEPAM 1 MG: 1 TABLET ORAL at 08:08

## 2024-10-19 RX ADMIN — ENOXAPARIN SODIUM 40 MG: 40 INJECTION SUBCUTANEOUS at 16:27

## 2024-10-19 RX ADMIN — PIPERACILLIN AND TAZOBACTAM 3.38 G: 3; .375 INJECTION, POWDER, LYOPHILIZED, FOR SOLUTION INTRAVENOUS at 11:04

## 2024-10-19 RX ADMIN — PROMETHAZINE HYDROCHLORIDE 25 MG: 25 INJECTION, SOLUTION INTRAMUSCULAR; INTRAVENOUS at 15:32

## 2024-10-19 RX ADMIN — LORAZEPAM 1 MG: 1 TABLET ORAL at 14:46

## 2024-10-19 RX ADMIN — CYCLOBENZAPRINE HYDROCHLORIDE 10 MG: 5 TABLET, FILM COATED ORAL at 04:04

## 2024-10-19 RX ADMIN — PROMETHAZINE HYDROCHLORIDE 25 MG: 25 INJECTION, SOLUTION INTRAMUSCULAR; INTRAVENOUS at 09:50

## 2024-10-19 RX ADMIN — TRAZODONE HYDROCHLORIDE 50 MG: 50 TABLET ORAL at 21:54

## 2024-10-19 RX ADMIN — PROCHLORPERAZINE EDISYLATE 10 MG: 5 INJECTION INTRAMUSCULAR; INTRAVENOUS at 04:03

## 2024-10-19 RX ADMIN — MAGNESIUM SULFATE HEPTAHYDRATE: 500 INJECTION, SOLUTION INTRAMUSCULAR; INTRAVENOUS at 20:03

## 2024-10-19 RX ADMIN — BACLOFEN 20 MG: 10 TABLET ORAL at 08:13

## 2024-10-19 RX ADMIN — PROMETHAZINE HYDROCHLORIDE 25 MG: 25 INJECTION, SOLUTION INTRAMUSCULAR; INTRAVENOUS at 20:03

## 2024-10-19 RX ADMIN — LORAZEPAM 1 MG: 1 TABLET ORAL at 18:59

## 2024-10-19 RX ADMIN — ACETAMINOPHEN 1000 MG: 325 SOLUTION ORAL at 16:52

## 2024-10-19 RX ADMIN — HYDROXYZINE HYDROCHLORIDE 50 MG: 25 TABLET, FILM COATED ORAL at 16:52

## 2024-10-19 ASSESSMENT — ACTIVITIES OF DAILY LIVING (ADL)
ADLS_ACUITY_SCORE: 32
ADLS_ACUITY_SCORE: 36
ADLS_ACUITY_SCORE: 32
ADLS_ACUITY_SCORE: 36
ADLS_ACUITY_SCORE: 32
ADLS_ACUITY_SCORE: 32
ADLS_ACUITY_SCORE: 36
ADLS_ACUITY_SCORE: 32
ADLS_ACUITY_SCORE: 32
ADLS_ACUITY_SCORE: 36
ADLS_ACUITY_SCORE: 28
ADLS_ACUITY_SCORE: 32
ADLS_ACUITY_SCORE: 32
ADLS_ACUITY_SCORE: 28
ADLS_ACUITY_SCORE: 32

## 2024-10-19 NOTE — PLAN OF CARE
"/66 (BP Location: Right arm)   Pulse 105   Temp 98  F (36.7  C) (Oral)   Resp 16   Ht 1.727 m (5' 8\")   Wt 60.9 kg (134 lb 3.2 oz)   SpO2 100%   BMI 20.41 kg/m      [id number]    Patient A&Ox4, on room air, independent, afebrile, VSS. Pain stated 8/10, dilaudid given x1. TPN running at 60 mL/hr with lipids running at 20.8 mL/hr.  was at bedside, he is very attentive and supportive. Continue POC.   "

## 2024-10-19 NOTE — PLAN OF CARE
Goal Outcome Evaluation:  POD# 7   A&OX4. RA. UAL. FLD - fair appetite, tolerating well. Q4H BG and Carb coverage. Creon started before meals. Nausea controlled with compazine x 1.pain tolerated w/ scheduled pain medications + oxycodone, Toradol and dilaudid. Meds given through G tube. G tube vented to gravity bag - large amount of output. J tube -  do NOT use, painful, to gravity, bag changed by WOC today. Ileostomy - watery green output, passing gas. Midline incision - JACKIE. CVC - contin TPN infusing into red lumen.     Plan of Care Reviewed With: patient    Overall Patient Progress: improvingOverall Patient Progress: improving    Outcome Evaluation: tolerated FLD. nausea well controlled. pt's ambulation improving. pain contiunues.

## 2024-10-19 NOTE — PROGRESS NOTES
Inpatient Diabetes Management Service: Daily Progress Note     HPI: Dinora Mcghee is a 59 year old with pancreatogenic diabetes s/p pancreatectomy and TPAIT in 2016,complicated by gastroparesis, as well as a comorbid history of HTN,HLD, Hypothyroidism, chronic nausea/and constipation, chronic malnutrition and TPN and Tube feeds dependent , who was admitted on 10/11/2024 for exploratory laparotomy, extensive lysis of adhesions, revision of J tube,and small bowel resection. Inpatient Diabetes Service consulted for management of diabetes in the setting of continuous TPN use and expected TF use in future. G and J tube on gravity          Assessment/Plan:     Assessment:   Pancreatogenic diabetes s/p pancreatectomy and TPAIT in 2016,went off insulin for about 4 years and now back on insulin , A1C-5.4% in 7/23/2024  S/p exploratory laparotomy, extensive lysis of adhesions, revision of J tube,and small bowel resection on 10/11/24  Chronic malnutrition, was on TPN X12 hour, 3 days/week and Tube feeds x 12 hours, 7 days/week at home, now tube feeds are off, TPN continuous  Poor oral intake  Continuous TPN related hyperglycemia  Gastroparesis    Plan/Recommendations:   - Increase regular insulin 14 units with cycled TPN dextrose at 200 g (goal) = 0.07 units/gram dextrose.   - Continue Lantus 3 units q 24 hrs for 24 hour at 1300. May need to increase with eating more.  - Continue Novolog carb coverage: 1 unit per 15 g cho TID AC and 1/20 prn snacks/supplements  - Continue Novolog Correction Scale: high resistance (ISF: 1:25)  at 08:00, 12:00 pm and 16:00  -Decrease overnight. Correction scale to 1/50 at 20:00, 00:00 and 4 am   - BG monitoring: Every 4 hours.  - Hypoglycemia protocol    - Carb counting protocol    Discussion: BG trending higher since TPN dextrose increased to 200 grams Now tonight going to cycle so give same carb, 200g over 12 hours.  Not many hypoglycemia events since admission like  had outpatient but had one last night to 99( but yandy said 61) She felt shaky and jittery so was given a popcicle and she bounced back to 204. No explanation for the low other then she got 3 units of Novolog? Will decrease correction to medium scale overnight when she gets her lows. But she also said that this happened at home and Dr Melissa said due to TPIAT cells kicking in?  Will increase regular insulin to 14 units with cycling- same amt of carbs over shorter period usually increased BG.  She is also eating more without pain or emesis.    Discharge Planning: (tentative)  Medications: TBD  Test Claims: TBD  Education:  Needs to be assessed closer to discharge.   Outpatient Follow-up:  recommend [facility] vs PCP     Please notify Inpatient Diabetes Service if changes are planned to steroids, nutrition, TPN/TF and anticipated procedures requiring prolonged NPO status.         Interval History/Review of Systems :   - The last 24 hours progress and nursing notes reviewed.  Pain resolved today.  Pain not worse with eating today.  No emesis still this admission.     - Wearing Yandy 3 CGM---usually yandy reads higher then finger stick?     Possible discharge next week per patient.  Will not go home on TF just TPN.  Has ostomy output  Creat=0.58, stable, WBC stable at 15.3  Planned Procedures/Surgeries:   Inpatient Glucose Control:       Recent Labs   Lab 10/19/24  0756 10/19/24  0616 10/19/24  0418 10/19/24  0356 10/19/24  0229 10/18/24  2208   * 204* 107* 99 194* 187*             Medications Impacting Glycemia:   Steroids: None  D5W containing solutions/medications: None  Other medications impacting glucose: NOne         Nutrition:   Orders Placed This Encounter      Regular Diet Adult  Prior to admission: was on TPN on Sunday, Tuesday and Thursday nights.  Also on enteral feeds  Supplements: none   TF: none. Was on 10/15/2024:  Amy Glass Peptide 1.5 (plain) @ 10 mL/hr via J-tube (only ran PM  "10/15- 6AM 10/16).   TPN:  Continuous: dextrose 140 g (10/13) -->  dextrose 170 g (10/14-10/15) --> dextrose increased to 200 g (10/17... current) Cycle to start over 12 hours with same dextrose 200g at 8 pm at 10/19/2024        Diabetes History: see full consult note for complete diabetes history   Diabetes Type and Duration: Since 2016, s/p pancreatectomy and TPAI     PTA Medication Regimen: Takes insulin levemir 3 units daily and novolog 1 unit if BG is 175-275, and 2 units if >275 up to every 4 hours as needed. Reports some times she keeps on  giving correction whole night but other times does it once or not require it all.  Historical Diabetes Medications: As above     Glucose monitoring device and frequency: Checks with Keukey 3  Outpatient Diabetes Provider: None        Physical Exam:   /78 (BP Location: Right arm, Cuff Size: Adult Regular)   Pulse 100   Temp 97.5  F (36.4  C) (Oral)   Resp 18   Ht 1.727 m (5' 8\")   Wt 60.9 kg (134 lb 4.8 oz)   SpO2 99%   BMI 20.42 kg/m    General:  well appearing, NAD, resting comfortably in bed.  Lungs: unlabored breathing on RA.  Mental Status:  Alert and oriented x3  Psych:  calm, cooperative, normal mood and affect          Data:     Lab Results   Component Value Date    A1C 5.4 07/23/2024    A1C 5.6 03/20/2024    A1C 5.4 02/17/2022    A1C 5.2 11/27/2021    A1C 5.5 09/18/2021    A1C 5.4 08/05/2021    A1C 5.7 05/12/2021    A1C 5.9 (A) 04/01/2021    A1C 5.5 12/17/2020    A1C 5.8 (H) 07/30/2020       ROUTINE IP LABS (Last four results)  BMP  Recent Labs   Lab 10/19/24  0756 10/19/24  0616 10/19/24  0418 10/19/24  0356 10/18/24  0932 10/18/24  0519 10/17/24  0525 10/17/24  0435 10/16/24  1011 10/16/24  0634   NA  --   --  141  --   --  139  --  141  --  137   POTASSIUM  --   --  3.8  --   --  3.9  --  3.9  --  3.7   CHLORIDE  --   --  102  --   --  101  --  103  --  99   KASSI  --   --  8.8  --   --  9.1  --  8.6*  --  8.9   CO2  --   --  26  --   --  26  --  27  " --  29   BUN  --   --  16.6  --   --  17.3  --  14.9  --  12.6   CR  --   --  0.58  --   --  0.57  --  0.49*  --  0.53   * 204* 107* 99   < > 192*   < > 174*   < > 214*    < > = values in this interval not displayed.     CBC  Recent Labs   Lab 10/19/24  0418 10/18/24  0519 10/17/24  0647 10/16/24  0634   WBC 15.3* 15.6* 15.3* 16.2*   RBC 2.92* 2.99* 2.67* 2.77*   HGB 8.9* 9.0* 8.1* 8.4*   HCT 27.5* 28.0* 25.2* 26.3*   MCV 94 94 94 95   MCH 30.5 30.1 30.3 30.3   MCHC 32.4 32.1 32.1 31.9   RDW 13.2 13.1 12.9 12.9   * 547* 412 329     Inpatient Diabetes Service will continue to follow, please don't hesitate to contact the team with any questions or concerns.     Maria Elena Wallace PA-C  Inpatient Diabetes Service  Pager: [phone]  Available on vocera    Plan discussed with patient, bedside RN, and primary team via this note.    To contact Inpatient Diabetes Service:     7 AM - 5 PM: Page the IDS GIGI following the patient that day (see filed or incomplete progress notes/consult notes under Endocrinology)    OR if uncertain of provider assignment: page job code [phone]    5 PM - 7 AM: First call after hours is to primary service.    For urgent after-hours questions, page job code for on call fellow: [phone]     I spent a total of 40 minutes on the date of the encounter doing prep/post-work, chart review, history and exam, documentation and further activities per the note including lab review, multidisciplinary communication, counseling the patient and/or coordinating care regarding acute hyper/hypoglycemic management, as well as discharge management and planning/communication.

## 2024-10-19 NOTE — PLAN OF CARE
"[id number]    Pt is A&O x4, VSS on room air. Pt reports moderate pain, flexeril given with improvement and oxy 10 mg given with no improvement. Pt is UAL. Pt is voiding spontaneously, LBM 10/19 via colostomy, and passing gas. Pt is tolerating full liquid diet, with intermittent nausea- compazine given with improvement. CVC is infusing TPN and lipids through purple and TKO in red. G-tube is C/D/I open to gravity, clamped for 30 mins after meds. J- tube is clamped inside bag. Pt CGM reported glucose of 70, hospital monitor reported 99. Pt was feeling off so popsicle was given without carb coverage- pt improved.     Vital signs:  Temp: 98.4  F (36.9  C) Temp src: Oral BP: 124/73 Pulse: 100   Resp: 18 SpO2: 97 % O2 Device: None (Room air) Oxygen Delivery: 2 LPM Height: 172.7 cm (5' 8\") Weight: 60.9 kg (134 lb 3.2 oz)  Estimated body mass index is 20.41 kg/m  as calculated from the following:    Height as of this encounter: 1.727 m (5' 8\").    Weight as of this encounter: 60.9 kg (134 lb 3.2 oz).      Goal Outcome Evaluation:      Plan of Care Reviewed With: patient    Overall Patient Progress: improvingOverall Patient Progress: improving    Outcome Evaluation: Tolerating full liquid diet, pain improving, but still 6/10 pain.      "

## 2024-10-19 NOTE — PROGRESS NOTES
"CRS PROGRESS NOTE    60 y/o F PMH DM type 1, chronic pancreatitis, gastroparesis, s/p prior pancreatectomy with auto islet transplant in 2016, chronic abdominal pain on buprenorphine, GJ tube, J for feeds and G for decompression and needs to vent 75% of the time, thrice weekly TPN on Sun-Tues-Thurs for slow transit constipation and gastroparesis. Now SP ex lap, BAUDILIO, SBR, revision of J tube and TAC with ileorectal anastomosis and DLI on 10/11.     S:  - No abdominal pain this AM  - Feeling a bit jittery and nauseated, attributes this to her high BS  - Having ostomy output  - Jtube pouched with improvement in skin irritation         O:  /73 (BP Location: Right arm, Cuff Size: Adult Regular)   Pulse 100   Temp 98.4  F (36.9  C) (Oral)   Resp 18   Ht 1.727 m (5' 8\")   Wt 60.9 kg (134 lb 3.2 oz)   SpO2 97%   BMI 20.41 kg/m      NAD  RR  No labored breathing  Abdomen soft, not distended  Midline incision closed with staples  Ileostomy pink and heathy with thin green output  Jtube in place and pouched    A/P:   - \"General diet\"; knows what she can and can't eat with her Gtube  - Nothing through the Jtube   - Gtube to depenedent drainage  - WOCN teaching  - Continue TPN, start cycling   - Endocrine consultation, appreciate recommendations - on sliding scale insulin   - Appropriate home medications   - Abx for postoperative infection/Jtube leakage and cellulitis, 5 days Zosyn   - Trazadone to help with sleep, PRN ativan   - Anticipate home Monday?   "

## 2024-10-19 NOTE — PLAN OF CARE
Goal Outcome Evaluation:  POD# 8 Exploratory laparotomy, extensive lysis of adhesions, revision of Jtube and small bowel resection, TOTAL ABDOMINAL COLECTOMY WITH ILEORECTAL ANASTAMOSIS, DIVERTING LOOP ILEOSTOMY, flexible sigmoidoscopy     A&OX4. Pt reported increased anxiety and sadness today - Ativan and Atarax given, pt reported slight improvement w/ anxiety. Tachycardic. RA. UAL. FLD - poor appetite. BG checks Q4H and Carb coverage. Creon pills given prior to meals. Emesis x 1,  compazine given x 1. pain tolerated w/ scheduled pain medications +  Toradol.  Meds given through G tube. Pt able to swallow some meds. G tube vented to gravity bag. J tube -  clamped, do NOT use, urostomy bag in place. Ileostomy - watery green output, passing gas. Midline incision - JACKIE. CVC - Caps needs to be changed, contin TPN infusing. Plan to discharge Monday on cycled TPN.     0650 Sonia Boland paged  Can you clarify BG orders ? Are we checking Q4H or just 3x/day ?    Sonia Gutiérrez - 7:31 pm  Every 4 hours as per endocrinologist note and check with primary team tomorrow if any changes    Plan of Care Reviewed With: patient    Overall Patient Progress: no changeOverall Patient Progress: no change    Outcome Evaluation: pt reported increased anxiety and sadness today - anxiety meds given, pt reported some relief. emesis x 1. pain decreased from yesterday. nausea continues. plan to d/c Monday w/ cycled TPN.

## 2024-10-19 NOTE — PROGRESS NOTES
CLINICAL NUTRITION SERVICES - BRIEF NOTE     Nutrition Prescription    Recommendations already ordered by Registered Dietitian (RD):  Updated TPN regimen:   TPN (cycled for 12 hours overnight from 8pm-8am): 1440 mL/day (120 mL/hr) with 200 g dextrose, 100 g AA, and 250 mL 20% IV SMOFlipid daily to provide 1580 kcal (25 kcal/kg), 1.6 g/kg PRO, GIR 4.6, and 32% kcal from fat per dosing weight     Future/Additional Recommendations:  -Monitor TPN tolerance  -Monitor wt trends  -Monitor labs     EVALUATION OF THE PROGRESS TOWARD GOALS   Diet: Regular    Nutrition Support: TPN (10/16- present): 1440 mL/day (60 mL/hr continuous) with 200 g dextrose, 100 g AA, and 250 mL 20% IV SMOFlipid daily to provide 1580 kcal (25 kcal/kg), 1.6 g/kg PRO, GIR 2.2, and 32% kcal from fat per dosing weight   Insulin in TPN started 10/17, 10 units regular insulin given yesterday.  K, Mg, Anil WNL's    NEW FINDINGS   PharmD paged RD inquiring about cycling TPN per CRS' recommendation. Chart reviewed. Pt at goal dextrose in TPN since 10/16.       INTERVENTIONS  Parenteral nutrition- begin cycling for 12 hour overnight starting tonight (8pm-8am)    Collaboration with other providers- discussed TPN plans with PharmD and Endo provider.     Monitoring/Evaluation  Progress toward goals will be monitored and evaluated per protocol.     Mary Kennedy RD (Maggie), LD- 5C Clinical Dietitian   Available on Shop2  No longer available by paging

## 2024-10-20 LAB
ANION GAP SERPL CALCULATED.3IONS-SCNC: 9 MMOL/L (ref 7–15)
BUN SERPL-MCNC: 22.2 MG/DL (ref 6–20)
CALCIUM SERPL-MCNC: 8.6 MG/DL (ref 8.8–10.4)
CHLORIDE SERPL-SCNC: 101 MMOL/L (ref 98–107)
CREAT SERPL-MCNC: 0.53 MG/DL (ref 0.51–0.95)
EGFRCR SERPLBLD CKD-EPI 2021: >90 ML/MIN/1.73M2
ERYTHROCYTE [DISTWIDTH] IN BLOOD BY AUTOMATED COUNT: 13.3 % (ref 10–15)
GLUCOSE BLDC GLUCOMTR-MCNC: 133 MG/DL (ref 70–99)
GLUCOSE BLDC GLUCOMTR-MCNC: 141 MG/DL (ref 70–99)
GLUCOSE BLDC GLUCOMTR-MCNC: 149 MG/DL (ref 70–99)
GLUCOSE BLDC GLUCOMTR-MCNC: 205 MG/DL (ref 70–99)
GLUCOSE BLDC GLUCOMTR-MCNC: 238 MG/DL (ref 70–99)
GLUCOSE SERPL-MCNC: 209 MG/DL (ref 70–99)
HCO3 SERPL-SCNC: 25 MMOL/L (ref 22–29)
HCT VFR BLD AUTO: 26.9 % (ref 35–47)
HGB BLD-MCNC: 8.5 G/DL (ref 11.7–15.7)
MAGNESIUM SERPL-MCNC: 2.3 MG/DL (ref 1.7–2.3)
MCH RBC QN AUTO: 30.2 PG (ref 26.5–33)
MCHC RBC AUTO-ENTMCNC: 31.6 G/DL (ref 31.5–36.5)
MCV RBC AUTO: 96 FL (ref 78–100)
PHOSPHATE SERPL-MCNC: 3.8 MG/DL (ref 2.5–4.5)
PLATELET # BLD AUTO: 782 10E3/UL (ref 150–450)
POTASSIUM SERPL-SCNC: 4.6 MMOL/L (ref 3.4–5.3)
RBC # BLD AUTO: 2.81 10E6/UL (ref 3.8–5.2)
SODIUM SERPL-SCNC: 135 MMOL/L (ref 135–145)
WBC # BLD AUTO: 16.9 10E3/UL (ref 4–11)

## 2024-10-20 PROCEDURE — 250N000013 HC RX MED GY IP 250 OP 250 PS 637: Performed by: COLON & RECTAL SURGERY

## 2024-10-20 PROCEDURE — 85014 HEMATOCRIT: CPT | Performed by: STUDENT IN AN ORGANIZED HEALTH CARE EDUCATION/TRAINING PROGRAM

## 2024-10-20 PROCEDURE — 250N000011 HC RX IP 250 OP 636

## 2024-10-20 PROCEDURE — 250N000013 HC RX MED GY IP 250 OP 250 PS 637

## 2024-10-20 PROCEDURE — 258N000003 HC RX IP 258 OP 636: Performed by: COLON & RECTAL SURGERY

## 2024-10-20 PROCEDURE — 250N000011 HC RX IP 250 OP 636: Performed by: COLON & RECTAL SURGERY

## 2024-10-20 PROCEDURE — 84100 ASSAY OF PHOSPHORUS: CPT | Performed by: COLON & RECTAL SURGERY

## 2024-10-20 PROCEDURE — 250N000011 HC RX IP 250 OP 636: Performed by: SURGERY

## 2024-10-20 PROCEDURE — 99232 SBSQ HOSP IP/OBS MODERATE 35: CPT | Mod: 24 | Performed by: PHYSICIAN ASSISTANT

## 2024-10-20 PROCEDURE — 120N000002 HC R&B MED SURG/OB UMMC

## 2024-10-20 PROCEDURE — 250N000013 HC RX MED GY IP 250 OP 250 PS 637: Performed by: SURGERY

## 2024-10-20 PROCEDURE — 80048 BASIC METABOLIC PNL TOTAL CA: CPT | Performed by: STUDENT IN AN ORGANIZED HEALTH CARE EDUCATION/TRAINING PROGRAM

## 2024-10-20 PROCEDURE — 250N000011 HC RX IP 250 OP 636: Performed by: STUDENT IN AN ORGANIZED HEALTH CARE EDUCATION/TRAINING PROGRAM

## 2024-10-20 PROCEDURE — 250N000013 HC RX MED GY IP 250 OP 250 PS 637: Performed by: STUDENT IN AN ORGANIZED HEALTH CARE EDUCATION/TRAINING PROGRAM

## 2024-10-20 PROCEDURE — 250N000009 HC RX 250: Performed by: COLON & RECTAL SURGERY

## 2024-10-20 PROCEDURE — 250N000009 HC RX 250: Performed by: STUDENT IN AN ORGANIZED HEALTH CARE EDUCATION/TRAINING PROGRAM

## 2024-10-20 PROCEDURE — 258N000003 HC RX IP 258 OP 636: Performed by: SURGERY

## 2024-10-20 PROCEDURE — 83735 ASSAY OF MAGNESIUM: CPT | Performed by: COLON & RECTAL SURGERY

## 2024-10-20 PROCEDURE — 250N000009 HC RX 250

## 2024-10-20 PROCEDURE — B4185 PARENTERAL SOL 10 GM LIPIDS: HCPCS | Performed by: COLON & RECTAL SURGERY

## 2024-10-20 RX ORDER — PIPERACILLIN SODIUM, TAZOBACTAM SODIUM 3; .375 G/15ML; G/15ML
3.38 INJECTION, POWDER, LYOPHILIZED, FOR SOLUTION INTRAVENOUS EVERY 6 HOURS
Status: COMPLETED | OUTPATIENT
Start: 2024-10-20 | End: 2024-10-22

## 2024-10-20 RX ORDER — VANCOMYCIN HYDROCHLORIDE 1 G/200ML
1000 INJECTION, SOLUTION INTRAVENOUS EVERY 12 HOURS
Status: DISCONTINUED | OUTPATIENT
Start: 2024-10-20 | End: 2024-10-21

## 2024-10-20 RX ORDER — LEVOTHYROXINE SODIUM 75 UG/1
150 TABLET ORAL
Status: DISCONTINUED | OUTPATIENT
Start: 2024-10-21 | End: 2024-10-28 | Stop reason: HOSPADM

## 2024-10-20 RX ADMIN — HYDROXYZINE HYDROCHLORIDE 50 MG: 25 TABLET, FILM COATED ORAL at 20:17

## 2024-10-20 RX ADMIN — ACETAMINOPHEN 1000 MG: 325 SOLUTION ORAL at 03:15

## 2024-10-20 RX ADMIN — ENOXAPARIN SODIUM 40 MG: 40 INJECTION SUBCUTANEOUS at 17:59

## 2024-10-20 RX ADMIN — ACETAMINOPHEN 1000 MG: 325 SOLUTION ORAL at 22:29

## 2024-10-20 RX ADMIN — HEPARIN, PORCINE (PF) 10 UNIT/ML INTRAVENOUS SYRINGE 5 ML: at 18:45

## 2024-10-20 RX ADMIN — PROMETHAZINE HYDROCHLORIDE 25 MG: 25 INJECTION, SOLUTION INTRAMUSCULAR; INTRAVENOUS at 20:02

## 2024-10-20 RX ADMIN — SMOFLIPID 250 ML: 6; 6; 5; 3 INJECTION, EMULSION INTRAVENOUS at 20:02

## 2024-10-20 RX ADMIN — PROMETHAZINE HYDROCHLORIDE 25 MG: 25 INJECTION, SOLUTION INTRAMUSCULAR; INTRAVENOUS at 08:39

## 2024-10-20 RX ADMIN — PROCHLORPERAZINE EDISYLATE 10 MG: 5 INJECTION INTRAMUSCULAR; INTRAVENOUS at 03:11

## 2024-10-20 RX ADMIN — ACETAMINOPHEN 1000 MG: 325 SOLUTION ORAL at 10:44

## 2024-10-20 RX ADMIN — MAGNESIUM SULFATE HEPTAHYDRATE: 500 INJECTION, SOLUTION INTRAMUSCULAR; INTRAVENOUS at 20:01

## 2024-10-20 RX ADMIN — DOXAZOSIN 1 MG: 4 TABLET ORAL at 08:42

## 2024-10-20 RX ADMIN — OXYCODONE HYDROCHLORIDE 10 MG: 5 SOLUTION ORAL at 14:19

## 2024-10-20 RX ADMIN — LORAZEPAM 1 MG: 1 TABLET ORAL at 08:40

## 2024-10-20 RX ADMIN — VANCOMYCIN HYDROCHLORIDE 1000 MG: 1 INJECTION, SOLUTION INTRAVENOUS at 21:10

## 2024-10-20 RX ADMIN — PANCRELIPASE 3 CAPSULE: 120000; 24000; 76000 CAPSULE, DELAYED RELEASE PELLETS ORAL at 08:42

## 2024-10-20 RX ADMIN — VANCOMYCIN HYDROCHLORIDE 1500 MG: 10 INJECTION, POWDER, LYOPHILIZED, FOR SOLUTION INTRAVENOUS at 08:39

## 2024-10-20 RX ADMIN — PANCRELIPASE 3 CAPSULE: 120000; 24000; 76000 CAPSULE, DELAYED RELEASE PELLETS ORAL at 18:28

## 2024-10-20 RX ADMIN — BACLOFEN 10 MG: 10 TABLET ORAL at 17:34

## 2024-10-20 RX ADMIN — Medication 50 MG: at 22:31

## 2024-10-20 RX ADMIN — LEVOTHYROXINE SODIUM 100 MCG: 20 INJECTION, SOLUTION INTRAVENOUS at 08:39

## 2024-10-20 RX ADMIN — BACLOFEN 10 MG: 10 TABLET ORAL at 20:03

## 2024-10-20 RX ADMIN — Medication 60 MG: at 20:03

## 2024-10-20 RX ADMIN — PIPERACILLIN AND TAZOBACTAM 3.38 G: 3; .375 INJECTION, POWDER, LYOPHILIZED, FOR SOLUTION INTRAVENOUS at 22:31

## 2024-10-20 RX ADMIN — TRAZODONE HYDROCHLORIDE 50 MG: 50 TABLET ORAL at 22:31

## 2024-10-20 RX ADMIN — INSULIN GLARGINE 3 UNITS: 100 INJECTION, SOLUTION SUBCUTANEOUS at 17:59

## 2024-10-20 RX ADMIN — Medication 5 ML: at 05:38

## 2024-10-20 RX ADMIN — ACETAMINOPHEN 1000 MG: 325 SOLUTION ORAL at 17:26

## 2024-10-20 RX ADMIN — PROMETHAZINE HYDROCHLORIDE 25 MG: 25 INJECTION, SOLUTION INTRAMUSCULAR; INTRAVENOUS at 17:26

## 2024-10-20 RX ADMIN — OXYCODONE HYDROCHLORIDE 10 MG: 5 SOLUTION ORAL at 03:25

## 2024-10-20 RX ADMIN — PIPERACILLIN AND TAZOBACTAM 3.38 G: 3; .375 INJECTION, POWDER, LYOPHILIZED, FOR SOLUTION INTRAVENOUS at 10:30

## 2024-10-20 RX ADMIN — PIPERACILLIN AND TAZOBACTAM 3.38 G: 3; .375 INJECTION, POWDER, LYOPHILIZED, FOR SOLUTION INTRAVENOUS at 01:44

## 2024-10-20 RX ADMIN — BACLOFEN 10 MG: 10 TABLET ORAL at 08:40

## 2024-10-20 RX ADMIN — PIPERACILLIN AND TAZOBACTAM 3.38 G: 3; .375 INJECTION, POWDER, LYOPHILIZED, FOR SOLUTION INTRAVENOUS at 17:27

## 2024-10-20 RX ADMIN — OXYCODONE HYDROCHLORIDE 10 MG: 5 SOLUTION ORAL at 17:26

## 2024-10-20 RX ADMIN — HEPARIN, PORCINE (PF) 10 UNIT/ML INTRAVENOUS SYRINGE 5 ML: at 19:03

## 2024-10-20 RX ADMIN — KETOROLAC TROMETHAMINE 30 MG: 30 INJECTION, SOLUTION INTRAMUSCULAR; INTRAVENOUS at 19:03

## 2024-10-20 ASSESSMENT — ACTIVITIES OF DAILY LIVING (ADL)
ADLS_ACUITY_SCORE: 28

## 2024-10-20 NOTE — PHARMACY-VANCOMYCIN DOSING SERVICE
"Pharmacy Vancomycin Initial Note  Date of Service 2024  Patient's  1965  59 year old, female    Indication: Abscess    Current estimated CrCl = Estimated Creatinine Clearance: 111.2 mL/min (based on SCr of 0.53 mg/dL).    Creatinine for last 3 days  10/18/2024:  5:19 AM Creatinine 0.57 mg/dL  10/19/2024:  4:18 AM Creatinine 0.58 mg/dL  10/20/2024:  5:37 AM Creatinine 0.53 mg/dL    Recent Vancomycin Level(s) for last 3 days  No results found for requested labs within last 3 days.      Vancomycin IV Administrations (past 72 hours)        No vancomycin orders with administrations in past 72 hours.                    Nephrotoxins and other renal medications (From now, onward)      Start     Dose/Rate Route Frequency Ordered Stop    10/19/24 1030  piperacillin-tazobactam (ZOSYN) 3.375 g vial to attach to  mL bag        Note to Pharmacy: For [facility], [facility] and [facility]: For Zosyn-naive patients, use the \"Zosyn initial dose + extended infusion\" order panel.    3.375 g  over 30 Minutes Intravenous EVERY 8 HOURS 10/19/24 0539 10/22/24 1029    10/16/24 1537  ketorolac (TORADOL) injection 30 mg         30 mg Intravenous EVERY 6 HOURS PRN 10/16/24 1537 10/21/24 1536            Contrast Orders - past 72 hours (72h ago, onward)      Start     Dose/Rate Route Frequency Stop    10/17/24 1130  iopamidol (ISOVUE-370) solution 82 mL         82 mL Intravenous ONCE 10/17/24 1101    10/17/24 0900  iohexol (OMNIPAQUE) 9 MG/ML oral solution 500 mL         500 mL Per OG Tube ONCE 10/17/24 0858            [employer] Prediction of Planned Initial Vancomycin Regimen  Loading dose: 1500 mg at 08:00 10/20/2024.  Regimen: 750 mg IV every 8 hours.  Start time: 16:00 on 10/20/2024  Exposure target: AUC24 (range)400-600 mg/L.hr   AUC24,ss: 531 mg/L.hr  Probability of AUC24 > 400: 77 %  Ctrough,ss: 17.1 mg/L  Probability of Ctrough,ss > 20: 37 %  Probability of nephrotoxicity (Lodise ALEKSANDAR 2009): 13 %          Plan:  Start vancomycin 1500 " mg IV once, followed by 750 mg IV q8h.   Vancomycin monitoring method: AUC  Vancomycin therapeutic monitoring goal: 400-600 mg*h/L  Pharmacy will check vancomycin levels as appropriate in 1-3 Days.    Serum creatinine levels will be ordered daily for the first week of therapy and at least twice weekly for subsequent weeks.      Chris Bower RPH

## 2024-10-20 NOTE — PROGRESS NOTES
Inpatient Diabetes Management Service: Daily Progress Note     HPI: Dinora Mcghee is a 59 year old with pancreatogenic diabetes s/p pancreatectomy and TPAIT in 2016,complicated by gastroparesis, as well as a comorbid history of HTN,HLD, Hypothyroidism, chronic nausea/and constipation, chronic malnutrition and TPN and Tube feeds dependent , who was admitted on 10/11/2024 for exploratory laparotomy, extensive lysis of adhesions, revision of J tube,and small bowel resection. Inpatient Diabetes Service consulted for management of diabetes in the setting of continuous TPN use and expected TF use in future. G and J tube on gravity          Assessment/Plan:     Assessment:   Pancreatogenic diabetes s/p pancreatectomy and TPAIT in 2016,went off insulin for about 4 years and now back on insulin , A1C-5.4% in 7/23/2024  S/p exploratory laparotomy, extensive lysis of adhesions, revision of J tube,and small bowel resection on 10/11/24  Chronic malnutrition, was on TPN X12 hour, 3 days/week and Tube feeds x 12 hours, 7 days/week at home, now tube feeds are off, TPN continuous  Poor oral intake  Continuous TPN related hyperglycemia  Gastroparesis  Abx for postoperative infection/Jtube leakage and cellulitis     Plan/Recommendations:   - Increase regular insulin 20 units with cycled TPN dextrose at 200 g (goal) = 0.10 units/gram dextrose.   - Continue Lantus 3 units q 24 hrs for 24 hour at 1300. ( No longer getting TPN during day)  - Continue Novolog carb coverage: 1 unit per 15 g cho TID AC and 1/20 prn snacks/supplements  - Decrease Novolog Correction Scale: Medium resistance (ISF: 1:50)  at 08:00, 12:00 pm and 16:00  -Change back to High Correction scale to 1/35 at 20:00, 00:00 and 4 am when tube feeds are running   - BG monitoring: Every 4 hours.  - Hypoglycemia protocol    - Carb counting protocol    Discussion:   This morning is the first day without TPN running during day and BG dropped to 133. She  "is trying to eat more and did eat more yesterday with 2 post prandial bg 275 and 268.  BG during the night with TPN running with same amt of carb but running over shorter period so BG running 204-268 so increased regular insulin tonight. Had been reluctant yesterday to increase insulin aggressively due to unexplained low as documented overnight the night before.  Likely leaving tomorrow.  Says she can count carbs but would have to review so she wants a fixed meal dose to discharge.      IP Diabetes Management Team Discharge Instructions ( tentative to be finalized on discharge day    Final plan needs to be copied to AVS    Supplies and insulin to be ordered by Primary team    Will need lantus pen as was on Levemir before- they will no longer be manufactoring Levemir after 12/2024.    Novolog pen    Yandy 3    Glucose Control Regimen:     Lantus 3 units every day at 1 pm    Regular insulin 20 units  in TPN cycled over 12 hours with dextrose 200 g     Novolog insulin to cover carbohydrates with meals ( \"fixed meal dose\":   2 units with each meal.  Do not give if do not eat meal )   If you want to try counting carbs again 1 unit of insulin for 15g of carb)    Check blood glucose every 4 hours  ( has Yandy)  Give correction insulin based on the following scale:    Use during the day when TPN is off:  Correction Scale - Medium INSULIN RESISTANCE DOSING   0800, 12:00. 16:00  Do Not give Correction Insulin if BG less than 140  Do not give more frequently then every 4 hours or you will stack insulin and get low  For Pre-Meal  - 189 give 1 unit.   For Pre-Meal  - 239 give 2 units.   For Pre-Meal  - 289 give 3 units.   For Pre-Meal  - 339 give 4 units.   For Pre-Meal - 389 give 5 units.   For Pre-Meal -439 give 6 units  For Pre-Meal BG greater than or equal to 440 give 7 units.    Use Correction scale when TPN is running from 8 pm to 8 am    Correction Scale - Custom INSULIN RESISTANCE " DOSING     Do Not give Correction Insulin if blood glucose < 140. Give at 8 pm, 12 am and 4 am  1 per 35 >/=140  For  to 174 give 1 units.  For  to 209 give 2 units.  For  to 244 give 3 units.  For  to 279 give 4 units.  For  to 314 give 5 units.  For  to 359 give 6 units.  For BG >/= to 360 give 7 units.     Blood Glucose Checks:     Check every 4 hours but then when blood glucose are stable could decrease to  three times daily before meals, and at bedtime.  Has yandy    Please call if you have more than 2 blood sugar over 250 in one day, or any sugars less than 75.       Endocrinology Outpatient follow up:   Patient should schedule your outpatient diabetes appointment 1-2 weeks from discharge with current provider     If you have urgent questions or concerns regarding your blood sugars or insulin, you may contact [phone] (the main hospital ). Ask to speak with the endocrinologist on call.        Thank you for letting the Diabetes Management Team be involved in your care!    Discharge Planning: (tentative)  Medications: TBD  Test Claims: lantus as current levemir is being discontinued  Education:  Unlikely to need, has given insulin before and used correction scale.   Outpatient Follow-up:  recommend  PCP [facility], last seen on 7.22.2024     Please notify Inpatient Diabetes Service if changes are planned to steroids, nutrition, TPN/TF and anticipated procedures requiring prolonged NPO status.         Interval History/Review of Systems :   - The last 24 hours progress and nursing notes reviewed.  Pain remains resolved today.  Eat a little more, took a pill and then went walking and had very small emesis.  A little nausea but overall better then before admission.     - Wearing Yandy 3 CGM---usually yandy reads higher then finger stick     Discharge tomorrow  Will not go home on TF just TPN.  Has ostomy output  Creat=0.53, stable, WBC=16.9 up  "from 15.3  Planned Procedures/Surgeries:  none  Inpatient Glucose Control:       Recent Labs   Lab 10/20/24  0537 10/20/24  0502 10/20/24  0128 10/19/24  2209 10/19/24  1626 10/19/24  1110   * 205* 238* 268* 189* 275*             Medications Impacting Glycemia:   Steroids: None  D5W containing solutions/medications: None  Other medications impacting glucose: NOne         Nutrition:   Orders Placed This Encounter      Regular Diet Adult  Prior to admission: was on TPN on Sunday, Tuesday and Thursday nights.  Also on enteral feeds  Supplements: none   TF: none. Was on 10/15/2024:  RedCap Peptide 1.5 (plain) @ 10 mL/hr via J-tube (only ran PM 10/15- 6AM 10/16).   TPN:  Continuous: dextrose 140 g (10/13) -->  dextrose 170 g (10/14-10/15) --> dextrose increased to 200 g (10/17) Cycle to run over 12 hours with same dextrose 200g at 8 pm at 10/19/2024        Diabetes History: see full consult note for complete diabetes history   Diabetes Type and Duration: Since 2016, s/p pancreatectomy and TPAI     PTA Medication Regimen: Takes insulin levemir 3 units daily and novolog 1 unit if BG is 175-275, and 2 units if >275 up to every 4 hours as needed. Reports some times she keeps on  giving correction whole night but other times does it once or not require it all.  Historical Diabetes Medications: As above     Glucose monitoring device and frequency: Checks with jim 3  Outpatient Diabetes Provider: None        Physical Exam:   /79 (BP Location: Right arm)   Pulse 101   Temp 98.4  F (36.9  C) (Oral)   Resp 18   Ht 1.727 m (5' 8\")   Wt 60.9 kg (134 lb 4.8 oz)   SpO2 97%   BMI 20.42 kg/m    General:  well appearing, NAD, resting comfortably in chair.  Lungs: unlabored breathing on RA.  Mental Status:  Alert and oriented x3  Psych:  calm, cooperative, normal mood and affect          Data:     Lab Results   Component Value Date    A1C 5.4 07/23/2024    A1C 5.6 03/20/2024    A1C 5.4 02/17/2022    A1C 5.2 " 11/27/2021    A1C 5.5 09/18/2021    A1C 5.4 08/05/2021    A1C 5.7 05/12/2021    A1C 5.9 (A) 04/01/2021    A1C 5.5 12/17/2020    A1C 5.8 (H) 07/30/2020       ROUTINE IP LABS (Last four results)  BMP  Recent Labs   Lab 10/20/24  0537 10/20/24  0502 10/20/24  0128 10/19/24  2209 10/19/24  0616 10/19/24  0418 10/18/24  0932 10/18/24  0519 10/17/24  0525 10/17/24  0435     --   --   --   --  141  --  139  --  141   POTASSIUM 4.6  --   --   --   --  3.8  --  3.9  --  3.9   CHLORIDE 101  --   --   --   --  102  --  101  --  103   KASSI 8.6*  --   --   --   --  8.8  --  9.1  --  8.6*   CO2 25  --   --   --   --  26  --  26  --  27   BUN 22.2*  --   --   --   --  16.6  --  17.3  --  14.9   CR 0.53  --   --   --   --  0.58  --  0.57  --  0.49*   * 205* 238* 268*   < > 107*   < > 192*   < > 174*    < > = values in this interval not displayed.     CBC  Recent Labs   Lab 10/20/24  0537 10/19/24  0418 10/18/24  0519 10/17/24  0647   WBC 16.9* 15.3* 15.6* 15.3*   RBC 2.81* 2.92* 2.99* 2.67*   HGB 8.5* 8.9* 9.0* 8.1*   HCT 26.9* 27.5* 28.0* 25.2*   MCV 96 94 94 94   MCH 30.2 30.5 30.1 30.3   MCHC 31.6 32.4 32.1 32.1   RDW 13.3 13.2 13.1 12.9   * 672* 547* 412     Inpatient Diabetes Service will continue to follow, please don't hesitate to contact the team with any questions or concerns.     Maria Elena Wallace PA-C  Inpatient Diabetes Service  Pager: [phone]  Available on vocera    Plan discussed with patient, bedside RN, and primary team via this note.    To contact Inpatient Diabetes Service:     7 AM - 5 PM: Page the IDS GIGI following the patient that day (see filed or incomplete progress notes/consult notes under Endocrinology)    OR if uncertain of provider assignment: page job code [phone]    5 PM - 7 AM: First call after hours is to primary service.    For urgent after-hours questions, page job code for on call fellow: [phone]     I spent a total of 45 minutes on the date of the encounter doing prep/post-work, chart  review, history and exam, documentation and further activities per the note including lab review, multidisciplinary communication, counseling the patient and/or coordinating care regarding acute hyper/hypoglycemic management, as well as discharge management and planning/communication.

## 2024-10-20 NOTE — PROGRESS NOTES
"  1900- 0700    /79 (BP Location: Right arm)   Pulse 101   Temp 98.4  F (36.9  C) (Oral)   Resp 18   Ht 1.727 m (5' 8\")   Wt 60.9 kg (134 lb 4.8 oz)   SpO2 97%   BMI 20.42 kg/m      Reason for admission: 10/11 s/p ex lap, BAUDILIO, SBR, revision of J tube and TAC with ileorectal anastomosis and DLI   Activity: UAL  Pain: Abdominal pain managed by x2 prn oxycodone, scheduled pain meds  Neuro: AOX4  Cardiac: Tachycardic within parameters  Respiratory: RA, no accessory muscle use  GI/: x2 prn compazine for nausea. Voiding spontaneously with adequate output. Ileostomy, G tube to gravity,J tube with urostomy bag clamped not used.   Diet: FLD, cycled TPN, lipids. Carb coverage  Lines: DL CVC, purple hep locked, red infusing tpn, lipids  Wounds: Midline abdominal incision, x3 lap sites  Labs/imaging: Reviewed, Q4 , 238, 205      No acute changes this shift.     Plan: Anticipated discherge Monday with cycled TPN       Continue to monitor and follow POC    "

## 2024-10-20 NOTE — PHARMACY-VANCOMYCIN DOSING SERVICE
Pharmacy Empiric Dose Change Per Policy     Original Dose Ordered: 1500mg x1 dose followed by 750mg q8h     Dose Changed To: 1500mg x1 dose followed by 1000mg q12h     This dose change was based on the pharmacist's assessment of this patient's age, weight, concurrent drug therapy, treatment goals, whether patient's creatinine clearance adequately indicates renal function (factoring in age, muscle mass, fluid and clinical status), and, if applicable, prior pharmacokinetic data.    Loading dose: N/A  Regimen: 1000 mg IV every 12 hours.  Start time: 20:39 on 10/20/2024  Exposure target: AUC24 (range)400-600 mg/L.hr   AUC24,ss: 469 mg/L.hr  Probability of AUC24 > 400: 66 %  Ctrough,ss: 13.5 mg/L  Probability of Ctrough,ss > 20: 22 %  Probability of nephrotoxicity (Lodise ALEKSANDAR 2009): 9 %     Estimated Creatinine Clearance: 112.1 mL/min (based on SCr of 0.53 mg/dL).     Will continue to follow and modify dosage according to levels, organ function and clinical condition.    Hardy Heredia RPH on 10/20/2024 at 11:10 AM

## 2024-10-20 NOTE — PROGRESS NOTES
Colorectal Surgery Progress Note  [facility]  POD#9      Subjective:  No acute events overnight. Pain is well controlled. No fever/chills. Tolerating regular diet. No nausea/vomiting. Passing gas and stool via colostomy bag. Burning around J-tube site improved.    Vitals:  Vitals:    10/19/24 2021 10/20/24 0118 10/20/24 0504 10/20/24 0816   BP: 124/80 117/68 125/79 119/73   BP Location: Right arm Right arm Right arm Left arm   Cuff Size:    Adult Regular   Pulse: 105 105 101    Resp: 18 18 18 18   Temp: 98.3  F (36.8  C) 97.5  F (36.4  C) 98.4  F (36.9  C) 98.9  F (37.2  C)   TempSrc: Oral Oral Oral Oral   SpO2: 100% 100% 97% 98%   Weight:    61.4 kg (135 lb 6.4 oz)   Height:         I/O:  I/O last 3 completed shifts:  In: 1905 [P.O.:640; I.V.:455; NG/GT:90]  Out: 3181 [Urine:1075; Emesis/NG output:1581; Stool:525]    Physical Exam:  Gen: AAOx3, NAD  Pulm: Non-labored breathing  Abd: Soft, non-distended, appropriately tender, no guarding   Incision C/D/I with staples in place.    Stoma pink and viable with stool and gas in bag   G tube in place.   J tube in place, pouched. improved skin erosion around tube site.  Ext:  Warm and well-perfused    BMP  Recent Labs   Lab 10/20/24  0537 10/20/24  0502 10/20/24  0128 10/19/24  2209 10/19/24  0616 10/19/24  0418 10/18/24  0932 10/18/24  0519 10/17/24  0525 10/17/24  0435     --   --   --   --  141  --  139  --  141   POTASSIUM 4.6  --   --   --   --  3.8  --  3.9  --  3.9   CHLORIDE 101  --   --   --   --  102  --  101  --  103   CO2 25  --   --   --   --  26  --  26  --  27   BUN 22.2*  --   --   --   --  16.6  --  17.3  --  14.9   CR 0.53  --   --   --   --  0.58  --  0.57  --  0.49*   * 205* 238* 268*   < > 107*   < > 192*   < > 174*   MAG 2.3  --   --   --   --  2.1  --  2.1  --  2.0   PHOS 3.8  --   --   --   --  4.0  --  3.8  --  3.2    < > = values in this interval not displayed.     CBC  Recent Labs   Lab 10/20/24  3413  10/19/24  0418 10/18/24  0519 10/17/24  0647   WBC 16.9* 15.3* 15.6* 15.3*   HGB 8.5* 8.9* 9.0* 8.1*   HCT 26.9* 27.5* 28.0* 25.2*   * 672* 547* 412         ASSESSMENT: This is a 59 year old female PMH DM type 1, chronic pancreatitis, gastroparesis, s/p prior pancreatectomy with auto islet transplant in 2016, chronic abdominal pain on buprenorphine, GJ tube, J for feeds and G for decompression and needs to vent 75% of the time, thrice weekly TPN on Sun-Tues-Thurs for slow transit constipation and gastroparesis. Now SP ex lap, BAUDILIO, SBR, revision of J tube and TAC with ileorectal anastomosis and DLI on 10/11.     Today:  - WBC rising. Added Vancomycin given extensive hx of abx and hospitalization  - If WBC continues to rise, will consider CT AP or J-Tube study to evaluate for leak around J-tube.  - Benign abdominal exam.  - Nothing through J-tube.  - When ready, will discharge with cycled TPN.    Neuro/Pain: Tylenol, baclofen, flexeril, enzo, oxy, dilaudid   CV: N/A  PULM: encourage IS.  GI/FEN: TPN. Reg Diet for comfort  : Voiding independently  Heme: N/A  ID: Follow WBC. Continue Zosyn. Added Vancomycin today.  Endocrine: SSI  Other: N/A  Lines: G Tube for decompression. Ok to give meds via G Tube.               J tube to gravity. Nothing via J tube.   Activity: as tolerated.  Ppx: LVNX  Dispo: Pending WBC, J tube. Will go home on TPN.    Clinically Significant Risk Factors               # Hypoalbuminemia: Lowest albumin = 3.1 g/dL at 10/14/2024  7:34 AM, will monitor as appropriate                # Moderate Malnutrition: based on nutrition assessment    # Financial/Environmental Concerns: none         Patient seen and discussed with Dr. Johnson and Dr. Bartholomew.    Keith Hernandez MD  General Surgery PGY-1  Pager: [phone]

## 2024-10-20 NOTE — PLAN OF CARE
Goal Outcome Evaluation:      Plan of Care Reviewed With: patient        Patient was experiencing anxiety this morning, lorazepam was given with relief for patient. Patient denies pain. Pt is able to empty her ostomy bag. She has a G and a J tube. G tube is to gravity, and if she is nauseated we can give meds through it- today she has been tolerating her pills. J tube is clamped and connected to a urostomy bag. TPN is cycled. VSS will continue to monitor.

## 2024-10-21 ENCOUNTER — APPOINTMENT (OUTPATIENT)
Dept: CT IMAGING | Facility: CLINIC | Age: 59
End: 2024-10-21
Payer: COMMERCIAL

## 2024-10-21 LAB
ALBUMIN SERPL BCG-MCNC: 3.3 G/DL (ref 3.5–5.2)
ALP SERPL-CCNC: 340 U/L (ref 40–150)
ALT SERPL W P-5'-P-CCNC: 102 U/L (ref 0–50)
ANION GAP SERPL CALCULATED.3IONS-SCNC: 10 MMOL/L (ref 7–15)
ANION GAP SERPL CALCULATED.3IONS-SCNC: 11 MMOL/L (ref 7–15)
ANION GAP SERPL CALCULATED.3IONS-SCNC: 12 MMOL/L (ref 7–15)
AST SERPL W P-5'-P-CCNC: 80 U/L (ref 0–45)
BILIRUB SERPL-MCNC: 0.3 MG/DL
BUN SERPL-MCNC: 16.4 MG/DL (ref 6–20)
BUN SERPL-MCNC: 25.5 MG/DL (ref 6–20)
BUN SERPL-MCNC: 26.3 MG/DL (ref 6–20)
CALCIUM SERPL-MCNC: 8.4 MG/DL (ref 8.8–10.4)
CALCIUM SERPL-MCNC: 8.4 MG/DL (ref 8.8–10.4)
CALCIUM SERPL-MCNC: 8.6 MG/DL (ref 8.8–10.4)
CHLORIDE SERPL-SCNC: 100 MMOL/L (ref 98–107)
CHLORIDE SERPL-SCNC: 101 MMOL/L (ref 98–107)
CHLORIDE SERPL-SCNC: 97 MMOL/L (ref 98–107)
CREAT SERPL-MCNC: 0.63 MG/DL (ref 0.51–0.95)
CREAT SERPL-MCNC: 0.66 MG/DL (ref 0.51–0.95)
CREAT SERPL-MCNC: 0.67 MG/DL (ref 0.51–0.95)
EGFRCR SERPLBLD CKD-EPI 2021: >90 ML/MIN/1.73M2
ERYTHROCYTE [DISTWIDTH] IN BLOOD BY AUTOMATED COUNT: 13.4 % (ref 10–15)
GLUCOSE BLDC GLUCOMTR-MCNC: 138 MG/DL (ref 70–99)
GLUCOSE BLDC GLUCOMTR-MCNC: 149 MG/DL (ref 70–99)
GLUCOSE BLDC GLUCOMTR-MCNC: 152 MG/DL (ref 70–99)
GLUCOSE BLDC GLUCOMTR-MCNC: 169 MG/DL (ref 70–99)
GLUCOSE BLDC GLUCOMTR-MCNC: 179 MG/DL (ref 70–99)
GLUCOSE BLDC GLUCOMTR-MCNC: 183 MG/DL (ref 70–99)
GLUCOSE BLDC GLUCOMTR-MCNC: 201 MG/DL (ref 70–99)
GLUCOSE SERPL-MCNC: 142 MG/DL (ref 70–99)
GLUCOSE SERPL-MCNC: 187 MG/DL (ref 70–99)
GLUCOSE SERPL-MCNC: 491 MG/DL (ref 70–99)
HCO3 SERPL-SCNC: 22 MMOL/L (ref 22–29)
HCO3 SERPL-SCNC: 23 MMOL/L (ref 22–29)
HCO3 SERPL-SCNC: 23 MMOL/L (ref 22–29)
HCT VFR BLD AUTO: 27 % (ref 35–47)
HGB BLD-MCNC: 8.6 G/DL (ref 11.7–15.7)
HOLD SPECIMEN: NORMAL
INR PPP: 1.12 (ref 0.85–1.15)
MAGNESIUM SERPL-MCNC: 2.5 MG/DL (ref 1.7–2.3)
MAGNESIUM SERPL-MCNC: 2.7 MG/DL (ref 1.7–2.3)
MCH RBC QN AUTO: 31 PG (ref 26.5–33)
MCHC RBC AUTO-ENTMCNC: 31.9 G/DL (ref 31.5–36.5)
MCV RBC AUTO: 98 FL (ref 78–100)
PHOSPHATE SERPL-MCNC: 3.8 MG/DL (ref 2.5–4.5)
PHOSPHATE SERPL-MCNC: 6.6 MG/DL (ref 2.5–4.5)
PLATELET # BLD AUTO: 872 10E3/UL (ref 150–450)
POTASSIUM SERPL-SCNC: 4.4 MMOL/L (ref 3.4–5.3)
POTASSIUM SERPL-SCNC: 5.4 MMOL/L (ref 3.4–5.3)
POTASSIUM SERPL-SCNC: 5.9 MMOL/L (ref 3.4–5.3)
PREALB SERPL-MCNC: 17 MG/DL (ref 20–40)
PROT SERPL-MCNC: 6.5 G/DL (ref 6.4–8.3)
RBC # BLD AUTO: 2.77 10E6/UL (ref 3.8–5.2)
SODIUM SERPL-SCNC: 131 MMOL/L (ref 135–145)
SODIUM SERPL-SCNC: 133 MMOL/L (ref 135–145)
SODIUM SERPL-SCNC: 135 MMOL/L (ref 135–145)
VANCOMYCIN SERPL-MCNC: 8.8 UG/ML
WBC # BLD AUTO: 19.1 10E3/UL (ref 4–11)

## 2024-10-21 PROCEDURE — 80202 ASSAY OF VANCOMYCIN: CPT | Performed by: COLON & RECTAL SURGERY

## 2024-10-21 PROCEDURE — 85610 PROTHROMBIN TIME: CPT | Performed by: COLON & RECTAL SURGERY

## 2024-10-21 PROCEDURE — 250N000013 HC RX MED GY IP 250 OP 250 PS 637: Performed by: COLON & RECTAL SURGERY

## 2024-10-21 PROCEDURE — 80048 BASIC METABOLIC PNL TOTAL CA: CPT

## 2024-10-21 PROCEDURE — 250N000009 HC RX 250

## 2024-10-21 PROCEDURE — 250N000009 HC RX 250: Performed by: STUDENT IN AN ORGANIZED HEALTH CARE EDUCATION/TRAINING PROGRAM

## 2024-10-21 PROCEDURE — 258N000003 HC RX IP 258 OP 636

## 2024-10-21 PROCEDURE — 250N000011 HC RX IP 250 OP 636: Performed by: SURGERY

## 2024-10-21 PROCEDURE — B4185 PARENTERAL SOL 10 GM LIPIDS: HCPCS | Performed by: COLON & RECTAL SURGERY

## 2024-10-21 PROCEDURE — 258N000003 HC RX IP 258 OP 636: Performed by: SURGERY

## 2024-10-21 PROCEDURE — 84100 ASSAY OF PHOSPHORUS: CPT | Performed by: COLON & RECTAL SURGERY

## 2024-10-21 PROCEDURE — 250N000009 HC RX 250: Performed by: COLON & RECTAL SURGERY

## 2024-10-21 PROCEDURE — 74177 CT ABD & PELVIS W/CONTRAST: CPT

## 2024-10-21 PROCEDURE — 250N000013 HC RX MED GY IP 250 OP 250 PS 637

## 2024-10-21 PROCEDURE — 99232 SBSQ HOSP IP/OBS MODERATE 35: CPT | Mod: 24 | Performed by: STUDENT IN AN ORGANIZED HEALTH CARE EDUCATION/TRAINING PROGRAM

## 2024-10-21 PROCEDURE — 250N000011 HC RX IP 250 OP 636

## 2024-10-21 PROCEDURE — 250N000013 HC RX MED GY IP 250 OP 250 PS 637: Performed by: STUDENT IN AN ORGANIZED HEALTH CARE EDUCATION/TRAINING PROGRAM

## 2024-10-21 PROCEDURE — 83735 ASSAY OF MAGNESIUM: CPT | Performed by: COLON & RECTAL SURGERY

## 2024-10-21 PROCEDURE — 250N000011 HC RX IP 250 OP 636: Performed by: COLON & RECTAL SURGERY

## 2024-10-21 PROCEDURE — 84134 ASSAY OF PREALBUMIN: CPT | Performed by: COLON & RECTAL SURGERY

## 2024-10-21 PROCEDURE — 120N000002 HC R&B MED SURG/OB UMMC

## 2024-10-21 PROCEDURE — 82310 ASSAY OF CALCIUM: CPT | Performed by: COLON & RECTAL SURGERY

## 2024-10-21 PROCEDURE — 250N000013 HC RX MED GY IP 250 OP 250 PS 637: Performed by: SURGERY

## 2024-10-21 PROCEDURE — 74177 CT ABD & PELVIS W/CONTRAST: CPT | Mod: 26 | Performed by: RADIOLOGY

## 2024-10-21 PROCEDURE — 85018 HEMOGLOBIN: CPT | Performed by: STUDENT IN AN ORGANIZED HEALTH CARE EDUCATION/TRAINING PROGRAM

## 2024-10-21 PROCEDURE — G0463 HOSPITAL OUTPT CLINIC VISIT: HCPCS

## 2024-10-21 RX ORDER — FAMOTIDINE 40 MG/5ML
20 POWDER, FOR SUSPENSION ORAL 2 TIMES DAILY
Status: DISCONTINUED | OUTPATIENT
Start: 2024-10-21 | End: 2024-10-21

## 2024-10-21 RX ORDER — LORAZEPAM 2 MG/ML
0.5 INJECTION INTRAMUSCULAR ONCE
Status: COMPLETED | OUTPATIENT
Start: 2024-10-21 | End: 2024-10-21

## 2024-10-21 RX ORDER — IOPAMIDOL 755 MG/ML
82 INJECTION, SOLUTION INTRAVASCULAR ONCE
Status: COMPLETED | OUTPATIENT
Start: 2024-10-21 | End: 2024-10-21

## 2024-10-21 RX ORDER — VANCOMYCIN HYDROCHLORIDE 1 G/200ML
1000 INJECTION, SOLUTION INTRAVENOUS EVERY 12 HOURS
Status: DISCONTINUED | OUTPATIENT
Start: 2024-10-21 | End: 2024-10-23

## 2024-10-21 RX ORDER — CALCIUM CARBONATE 500 MG/1
500 TABLET, CHEWABLE ORAL 2 TIMES DAILY PRN
Status: DISCONTINUED | OUTPATIENT
Start: 2024-10-21 | End: 2024-10-28 | Stop reason: HOSPADM

## 2024-10-21 RX ADMIN — PIPERACILLIN AND TAZOBACTAM 3.38 G: 3; .375 INJECTION, POWDER, LYOPHILIZED, FOR SOLUTION INTRAVENOUS at 22:30

## 2024-10-21 RX ADMIN — SODIUM CHLORIDE 500 ML: 9 INJECTION, SOLUTION INTRAVENOUS at 11:36

## 2024-10-21 RX ADMIN — BACLOFEN 10 MG: 10 TABLET ORAL at 20:16

## 2024-10-21 RX ADMIN — LORAZEPAM 0.5 MG: 2 INJECTION INTRAMUSCULAR; INTRAVENOUS at 03:10

## 2024-10-21 RX ADMIN — ENOXAPARIN SODIUM 40 MG: 40 INJECTION SUBCUTANEOUS at 16:21

## 2024-10-21 RX ADMIN — ACETAMINOPHEN 1000 MG: 325 SOLUTION ORAL at 05:08

## 2024-10-21 RX ADMIN — SMOFLIPID 250 ML: 6; 6; 5; 3 INJECTION, EMULSION INTRAVENOUS at 20:28

## 2024-10-21 RX ADMIN — PANCRELIPASE 1 CAPSULE: 120000; 24000; 76000 CAPSULE, DELAYED RELEASE PELLETS ORAL at 06:03

## 2024-10-21 RX ADMIN — IOPAMIDOL 82 ML: 755 INJECTION, SOLUTION INTRAVENOUS at 17:36

## 2024-10-21 RX ADMIN — FAMOTIDINE 20 MG: 10 INJECTION, SOLUTION INTRAVENOUS at 08:28

## 2024-10-21 RX ADMIN — MAGNESIUM SULFATE HEPTAHYDRATE: 500 INJECTION, SOLUTION INTRAMUSCULAR; INTRAVENOUS at 20:28

## 2024-10-21 RX ADMIN — PROCHLORPERAZINE EDISYLATE 10 MG: 5 INJECTION INTRAMUSCULAR; INTRAVENOUS at 08:53

## 2024-10-21 RX ADMIN — TRAZODONE HYDROCHLORIDE 50 MG: 50 TABLET ORAL at 23:20

## 2024-10-21 RX ADMIN — Medication 5 ML: at 06:20

## 2024-10-21 RX ADMIN — OXYCODONE HYDROCHLORIDE 10 MG: 5 SOLUTION ORAL at 10:41

## 2024-10-21 RX ADMIN — PIPERACILLIN AND TAZOBACTAM 3.38 G: 3; .375 INJECTION, POWDER, LYOPHILIZED, FOR SOLUTION INTRAVENOUS at 16:24

## 2024-10-21 RX ADMIN — PANCRELIPASE 1 CAPSULE: 120000; 24000; 76000 CAPSULE, DELAYED RELEASE PELLETS ORAL at 18:37

## 2024-10-21 RX ADMIN — BACLOFEN 10 MG: 10 TABLET ORAL at 14:29

## 2024-10-21 RX ADMIN — PIPERACILLIN AND TAZOBACTAM 3.38 G: 3; .375 INJECTION, POWDER, LYOPHILIZED, FOR SOLUTION INTRAVENOUS at 05:08

## 2024-10-21 RX ADMIN — LORAZEPAM 1 MG: 1 TABLET ORAL at 22:19

## 2024-10-21 RX ADMIN — CYCLOBENZAPRINE HYDROCHLORIDE 10 MG: 5 TABLET, FILM COATED ORAL at 16:24

## 2024-10-21 RX ADMIN — PROMETHAZINE HYDROCHLORIDE 25 MG: 25 INJECTION, SOLUTION INTRAMUSCULAR; INTRAVENOUS at 14:29

## 2024-10-21 RX ADMIN — PROMETHAZINE HYDROCHLORIDE 25 MG: 25 INJECTION, SOLUTION INTRAMUSCULAR; INTRAVENOUS at 07:40

## 2024-10-21 RX ADMIN — HEPARIN, PORCINE (PF) 10 UNIT/ML INTRAVENOUS SYRINGE 5 ML: at 09:01

## 2024-10-21 RX ADMIN — CALCIUM CARBONATE (ANTACID) CHEW TAB 500 MG 500 MG: 500 CHEW TAB at 05:30

## 2024-10-21 RX ADMIN — PROCHLORPERAZINE EDISYLATE 10 MG: 5 INJECTION INTRAMUSCULAR; INTRAVENOUS at 01:04

## 2024-10-21 RX ADMIN — PIPERACILLIN AND TAZOBACTAM 3.38 G: 3; .375 INJECTION, POWDER, LYOPHILIZED, FOR SOLUTION INTRAVENOUS at 10:18

## 2024-10-21 RX ADMIN — Medication 50 MG: at 23:20

## 2024-10-21 RX ADMIN — ACETAMINOPHEN 1000 MG: 325 SOLUTION ORAL at 23:22

## 2024-10-21 RX ADMIN — LEVOTHYROXINE SODIUM 150 MCG: 75 TABLET ORAL at 08:02

## 2024-10-21 RX ADMIN — ACETAMINOPHEN 1000 MG: 325 SOLUTION ORAL at 10:18

## 2024-10-21 RX ADMIN — ACETAMINOPHEN 1000 MG: 325 SOLUTION ORAL at 17:53

## 2024-10-21 RX ADMIN — FAMOTIDINE 20 MG: 10 INJECTION, SOLUTION INTRAVENOUS at 19:57

## 2024-10-21 RX ADMIN — DOXAZOSIN 1 MG: 4 TABLET ORAL at 08:02

## 2024-10-21 RX ADMIN — Medication 5 ML: at 13:37

## 2024-10-21 RX ADMIN — Medication 60 MG: at 20:16

## 2024-10-21 RX ADMIN — INSULIN GLARGINE 3 UNITS: 100 INJECTION, SOLUTION SUBCUTANEOUS at 16:24

## 2024-10-21 RX ADMIN — VANCOMYCIN HYDROCHLORIDE 1000 MG: 1 INJECTION, SOLUTION INTRAVENOUS at 09:27

## 2024-10-21 RX ADMIN — VANCOMYCIN HYDROCHLORIDE 1000 MG: 1 INJECTION, SOLUTION INTRAVENOUS at 20:51

## 2024-10-21 RX ADMIN — BACLOFEN 10 MG: 10 TABLET ORAL at 08:02

## 2024-10-21 RX ADMIN — PROMETHAZINE HYDROCHLORIDE 25 MG: 25 INJECTION, SOLUTION INTRAMUSCULAR; INTRAVENOUS at 20:08

## 2024-10-21 ASSESSMENT — ACTIVITIES OF DAILY LIVING (ADL)
ADLS_ACUITY_SCORE: 31
ADLS_ACUITY_SCORE: 28
ADLS_ACUITY_SCORE: 33
ADLS_ACUITY_SCORE: 28
ADLS_ACUITY_SCORE: 31
ADLS_ACUITY_SCORE: 28
ADLS_ACUITY_SCORE: 28
ADLS_ACUITY_SCORE: 33
ADLS_ACUITY_SCORE: 28
ADLS_ACUITY_SCORE: 33
ADLS_ACUITY_SCORE: 33
ADLS_ACUITY_SCORE: 28
ADLS_ACUITY_SCORE: 28
ADLS_ACUITY_SCORE: 31
ADLS_ACUITY_SCORE: 33
ADLS_ACUITY_SCORE: 31

## 2024-10-21 NOTE — PROGRESS NOTES
Inpatient Diabetes Management Service: Daily Progress Note     HPI: iDnora Mcghee is a 59 year old with pancreatogenic diabetes s/p pancreatectomy and TPAIT in 2016,complicated by gastroparesis, as well as a comorbid history of HTN,HLD, Hypothyroidism, chronic nausea/and constipation, chronic malnutrition and TPN and Tube feeds dependent , who was admitted on 10/11/2024 for exploratory laparotomy, extensive lysis of adhesions, revision of J tube,and small bowel resection. Inpatient Diabetes Service consulted for management of diabetes in the setting of continuous TPN use and expected TF use in future. G and J tube on gravity          Assessment/Plan:     Assessment:   Pancreatogenic diabetes s/p pancreatectomy and TPAIT in 2016,went off insulin for about 4 years and now back on insulin , A1C-5.4% in 7/23/2024  S/p exploratory laparotomy, extensive lysis of adhesions, revision of J tube,and small bowel resection on 10/11/24  Chronic malnutrition, was on TPN X12 hour, 3 days/week and Tube feeds x 12 hours, 7 days/week at home, now tube feeds are off, TPN continuous  Poor oral intake  Continuous TPN related hyperglycemia  Gastroparesis  Abx for postoperative infection/Jtube leakage and cellulitis     Plan/Recommendations:   - Regular insulin 20 units with cycled TPN dextrose at 200 g (goal) = 0.10 units/gram dextrose.   - Lantus 3 units q 24 hrs for 24 hour at 1300  - Novolog carb coverage: 1 unit per 15 g cho TID AC and 1/20 prn snacks/supplements  - Novolog Correction Scale:   - 1:50 >140 at 08:00, 12:00 pm and 16:00 (when TF are off)  - 1:35 >140 at 20:00, 00:00 and 4 am (when TF are running)  - BG monitoring: Every 4 hours.  - Hypoglycemia protocol    - Carb counting protocol    Discussion: Overall BG trending well. Not discharging today per primary team. Cyclic TPN. Continue current diabetes regimen. Discussed use of correction scale with patient, correction should ideally be 3-4 hours  "from any carbs consumed with carb coverage. Ok to still correct as long as minimum >2 hrs from eating carbs and receiving Novolog carb coverage. BG trending higher at noon today, will wait for trend.     Copied forward from 10/20: Says she can count carbs but would have to review so she wants a fixed meal dose to discharge.      Inpatient Diabetes Management Team Discharge Instructions: (TENTATIVE)    Supplies and insulin to be ordered by Primary team:   Will need lantus pen as was on Levemir before- they will no longer be manufactoring Levemir after 12/2024.  Novolog pen  Yandy 3    Blood Glucose Checks: Three times daily before meals, and at bedtime. Has yandy 3 CGM. Please call if you have more than 2 blood sugar over 250 in one day, or any sugars less than 75.     Glucose Control Regimen:     Lantus insulin: 3 units every day at 1 pm    Regular insulin 20 units in TPN cycled over 12 hours with dextrose 200 g     Novolog insulin to cover carbohydrates with meals: ( \"fixed meal dose\":  2 units with each meal.  Do not give if do not eat meal ) If you want to try counting carbs again 1 unit of insulin for 15g of carb)    Novolog insulin correction scale: three times daily before meals and at bedtime (has Yandy)  Give correction insulin based on the following scale: *Give even if skipping a meal* at 8AM, noon, 5PM, bedtime    Do Not give Correction Insulin if BG less than 140  Do not give more frequently then every 4 hours or you will stack insulin and risk going low  Blood Glucose Before Meals, Add   Less than 140 0 units   140 -189  1 units   190 - 239 2 units   240 - 289 3 units   290 - 339 4 units    340 - 389  5 units   390 - 439 6 units   >/= 440 7 units       Endocrinology Outpatient follow up:    Patient should schedule your outpatient diabetes appointment 1-2 weeks from discharge with current provider (Dr. Melissa)     If you have urgent questions or concerns regarding your blood sugars or insulin, you may " contact [phone] (the main hospital ). Ask to speak with the endocrinologist on call.      Discharge Planning: (tentative)  Medications: TBD  Test Claims: lantus as current levemir is being discontinued  Education:  Unlikely to need, has given insulin before and used correction scale.   Outpatient Follow-up:  recommend  PCP Juan Jose Gomez M.D at the [facility], last seen on 7.22.2024     Please notify Inpatient Diabetes Service if changes are planned to steroids, nutrition, TPN/TF and anticipated procedures requiring prolonged NPO status.         Interval History/Review of Systems :   - The last 24 hours progress and nursing notes reviewed.  - Having nausea and multiple episodes of emesis overnight despite no PO intake. Feeling a little better this morning after given anti-nausea meds.   - Wearing Yandy 3 CGM     Planned Procedures/Surgeries:  none    Inpatient Glucose Control:       Recent Labs   Lab 10/21/24  0405 10/21/24  0050 10/20/24  2021 10/20/24  1802 10/20/24  1028 10/20/24  0537   * 169* 141* 149* 133* 209*             Medications Impacting Glycemia:   Steroids: None  D5W containing solutions/medications: None  Other medications impacting glucose: NOne         Nutrition:   Orders Placed This Encounter      Regular Diet Adult  Prior to admission: was on TPN on Sunday, Tuesday and Thursday nights.  Also on enteral feeds  Supplements: none   TF: none. Was on 10/15/2024:  Optimus3 Peptide 1.5 (plain) @ 10 mL/hr via J-tube (only ran PM 10/15- 6AM 10/16).   TPN:  Continuous: dextrose 140 g (10/13) -->  dextrose 170 g (10/14-10/15) --> dextrose increased to 200 g (10/17 - 10/18) --> started cycle over 12 hours with same dextrose 200g at 8 pm at 10/19/2024... current.        Diabetes History: see full consult note for complete diabetes history   Diabetes Type and Duration: Since 2016, s/p pancreatectomy and TPAI     PTA Medication Regimen: Takes insulin levemir 3 units daily and novolog 1 unit  "if BG is 175-275, and 2 units if >275 up to every 4 hours as needed. Reports some times she keeps on  giving correction whole night but other times does it once or not require it all.  Historical Diabetes Medications: As above     Glucose monitoring device and frequency: Checks with [employer] 3  Outpatient Diabetes Provider: Dr. Melissa (last visit 4/2024)       Physical Exam:   /65 (BP Location: Left arm)   Pulse 97   Temp 98.4  F (36.9  C) (Oral)   Resp 16   Ht 1.727 m (5' 8\")   Wt 61.4 kg (135 lb 6.4 oz)   SpO2 98%   BMI 20.59 kg/m    General:  well appearing, NAD, resting comfortably in bed.  Lungs: unlabored breathing on RA.  Mental Status:  Alert and oriented x3  Psych:  calm, cooperative, normal mood and affect          Data:     Lab Results   Component Value Date    A1C 5.4 07/23/2024    A1C 5.6 03/20/2024    A1C 5.4 02/17/2022    A1C 5.2 11/27/2021    A1C 5.5 09/18/2021    A1C 5.4 08/05/2021    A1C 5.7 05/12/2021    A1C 5.9 (A) 04/01/2021    A1C 5.5 12/17/2020    A1C 5.8 (H) 07/30/2020       ROUTINE IP LABS (Last four results)  BMP  Recent Labs   Lab 10/21/24  0405 10/21/24  0050 10/20/24  2021 10/20/24  1802 10/20/24  1028 10/20/24  0537 10/19/24  0616 10/19/24  0418 10/18/24  0932 10/18/24  0519 10/17/24  0525 10/17/24  0435   NA  --   --   --   --   --  135  --  141  --  139  --  141   POTASSIUM  --   --   --   --   --  4.6  --  3.8  --  3.9  --  3.9   CHLORIDE  --   --   --   --   --  101  --  102  --  101  --  103   KASSI  --   --   --   --   --  8.6*  --  8.8  --  9.1  --  8.6*   CO2  --   --   --   --   --  25  --  26  --  26  --  27   BUN  --   --   --   --   --  22.2*  --  16.6  --  17.3  --  14.9   CR  --   --   --   --   --  0.53  --  0.58  --  0.57  --  0.49*   * 169* 141* 149*   < > 209*   < > 107*   < > 192*   < > 174*    < > = values in this interval not displayed.     CBC  Recent Labs   Lab 10/20/24  0537 10/19/24  0418 10/18/24  0519 10/17/24  0647   WBC 16.9* 15.3* 15.6* 15.3* "   RBC 2.81* 2.92* 2.99* 2.67*   HGB 8.5* 8.9* 9.0* 8.1*   HCT 26.9* 27.5* 28.0* 25.2*   MCV 96 94 94 94   MCH 30.2 30.5 30.1 30.3   MCHC 31.6 32.4 32.1 32.1   RDW 13.3 13.2 13.1 12.9   * 672* 547* 412     Inpatient Diabetes Service will continue to follow, please don't hesitate to contact the team with any questions or concerns.     Homa Valencia PA-C  Inpatient Diabetes Service  Pager: [phone]  Available on vocera    Plan discussed with patient, bedside RN, and primary team via this note.    To contact Inpatient Diabetes Service:     7 AM - 5 PM: Page the IDS GIGI following the patient that day (see filed or incomplete progress notes/consult notes under Endocrinology)    OR if uncertain of provider assignment: page job code [phone]    5 PM - 7 AM: First call after hours is to primary service.    For urgent after-hours questions, page job code for on call fellow: [phone]     I spent a total of 35 minutes on the date of the encounter doing prep/post-work, chart review, history and exam, documentation and further activities per the note including lab review, multidisciplinary communication, counseling the patient and/or coordinating care regarding acute hyper/hypoglycemic management, as well as discharge management and planning/communication.     done

## 2024-10-21 NOTE — PROGRESS NOTES
Care Management Follow Up    Length of Stay (days): 10    Expected Discharge Date: 10/23/2024     Concerns to be Addressed: discharge planning     Patient plan of care discussed at interdisciplinary rounds: Yes    Anticipated Discharge Disposition: Home, Home Care, Home Infusion     Anticipated Discharge Services: Home Care  Anticipated Discharge DME: None (Resume: TF, TPN, G + J tube NEW: Ostomy)    Patient/family educated on Medicare website which has current facility and service quality ratings: no  Education Provided on the Discharge Plan:    Patient/Family in Agreement with the Plan:      Referrals Placed by CM/SW: Internal Clinic Care Coordination, Homecare  Private pay costs discussed: Not applicable    Discussed  Partnership in Safe Discharge Planning  document with patient/family: No     Handoff Completed: Yes, [facility] PCP: Internal handoff referral completed    Ambulatory care hand-in received 10/15 from:   Simin GALO RN Care Manager   [facility]   Phone: [phone]   Fax: [phone]      Additional Information:  Per H &P: s/p TAC and ileorectal anastomosis, diverting loop ileostomy on 10/11 for slow transit constipation  -Plan is to discharge home with resumption of home care/home infusion  -Patient's  can provide transport at time of discharge     Per am rounds/chart review 10/15  plan to start trickle feed per J tube  Anticipate slow advancement of diet     RNCC met briefly with Dinora at the bedside - she reports improved pain management, ostomy is starting to produce gas and stool. She reports that MARJORIE is as early as Friday 10/18.     Update 10/21:   Per MD Shama Hernandez at am rounds, patient had ongoing nausea/emesis, elevated WBC count, diet decreased to NPO. Plan for CT today. Remains on cycled TPN.   RNCC updated CARLYN Masterson liaison on MARJORIE 10/23  RNCC updated [facility] that patient is not medically ready. They will continue to follow, appreciated the update.       Current Services confirmed 10/14:  RNCC confirmed with CARLYN Benavides liaison, that patient is open to the following agencies. [facility] for her IV and enteral supplies, and [facility] for her nursing cares   Resumption of care orders placed for both home care and home infusion  Primary Care - Care Coordination Referral order placed per automatic BPA     [facility] [place] ([facility]) (RN cares for G tube, J tube, Ann)                   Services Available   Home Health Services       Address   [address] [place] [address] [address]  [place] [address]             Contact Information    [phone]         [facility] (TF and TPN)  [phone]  CARLYN Benavides liaison   [phone]  Teams      Next Steps:   Follow up in care rounds to confirm any changes in discharge needs  Update [facility] and [facility] as needed   Patient's  can provide transport home     NI Garcia  Care Management Department  Covering 5A: 8811-5762 & 5C: 4678-1221 (non-BMT)   Phone: [phone]  Teams  Vocera: weekdays 8:00 am - 4:30 pm.   See Vocera Care Team for off-day coverage

## 2024-10-21 NOTE — PROVIDER NOTIFICATION
10/21/24 0650   Output (mL)   Emesis 800 mL  (pt up to BR, states she threw up 800cc into graduate - then dumped it into toilet.  Amount not observed by RN.  Walked to ColoRectal workroom and told 3 MDs.)       RN called into room.  Pt was found sitting on toilet.  Pt states she threw up 800 ml into graduate, then dumped it into toilet.  Walked down to MD work room to update MDs - 3 MDs aware.    Awaiting MD orders.

## 2024-10-21 NOTE — CONSULTS
Discharge Pharmacy Test Claim    Patient's commercial [place] plan covers insulin glargine-yfgn with $0 copay.      Test Claim Copay   insulin glargine-yfgn 0.00   lantus solostar pens not covered     Yoselyn Lin  [facility] Liaison, [place]  Ph: [phone]  Fax: [phone]  Available on Teams and Vocera

## 2024-10-21 NOTE — PROGRESS NOTES
Colorectal Surgery Progress Note  [facility]  POD#10      Subjective:  Had 2 small episodes of emesis overnight, <10 ml each. Had some heartburn that was partially controlled with tums. Now having nausea and had a larger episode of emesis, ~800 ml. Pain is well controlled. On regular diet with enzymes for digestion.   Voiding independently, passing gas and stool via colostomy.    Vitals:  Vitals:    10/20/24 2314 10/21/24 0407 10/21/24 0750 10/21/24 0945   BP: 94/59 113/65 102/64    BP Location: Left arm Left arm Right arm    Cuff Size:       Pulse: 97  107    Resp: 18 16 18    Temp: 98.9  F (37.2  C) 98.4  F (36.9  C) 98.2  F (36.8  C)    TempSrc: Oral Oral Oral    SpO2: 99% 98% 98%    Weight:    61.6 kg (135 lb 14.4 oz)   Height:         I/O:  I/O last 3 completed shifts:  In: 1641.4 [I.V.:100]  Out: 4070 [Urine:1550; Emesis/NG output:1295; Stool:1225]    Physical Exam:  Gen: AAOx3, NAD  Pulm: Non-labored breathing  Abd: Soft, non-distended, appropriately tender, no guarding   Incision C/D/I   Stoma pink and viable with stool and gas in bag   G tube in place.               J tube in place, pouched. improved skin erosion around tube site.  Ext:  Warm and well-perfused    BMP  Recent Labs   Lab 10/21/24  0759 10/21/24  0748 10/21/24  0621 10/21/24  0405 10/20/24  1028 10/20/24  0537 10/19/24  0616 10/19/24  0418   *  --  131*  --   --  135  --  141   POTASSIUM 5.4*  --  5.9*  --   --  4.6  --  3.8   CHLORIDE 100  --  97*  --   --  101  --  102   CO2 23  --  22  --   --  25  --  26   BUN 26.3*  --  25.5*  --   --  22.2*  --  16.6   CR 0.63  --  0.66  --   --  0.53  --  0.58   * 179* 491* 152*   < > 209*   < > 107*   MAG 2.5*  --  2.7*  --   --  2.3  --  2.1   PHOS 3.8  --  6.6*  --   --  3.8  --  4.0    < > = values in this interval not displayed.     CBC  Recent Labs   Lab 10/21/24  0621 10/20/24  0537 10/19/24  0418 10/18/24  0519   WBC 19.1* 16.9* 15.3* 15.6*   HGB 8.6*  8.5* 8.9* 9.0*   HCT 27.0* 26.9* 27.5* 28.0*   * 782* 672* 547*         ASSESSMENT: This is a 59 year old female PMH DM type 1, chronic pancreatitis, gastroparesis, s/p prior pancreatectomy with auto islet transplant in 2016, chronic abdominal pain on buprenorphine, GJ tube, J for feeds and G for decompression and needs to vent 75% of the time, thrice weekly TPN on Sun-Tues-Thurs for slow transit constipation and gastroparesis. Now SP ex lap, BAUDILIO, SBR, revision of J tube and TAC with ileorectal anastomosis and DLI on 10/11.     Today:  - Emesis - made NPO and PRN compazine given. Added TUMS and Famotidine.  - WBC 19.1 from 16.9 yesterday. Ordered CT AP with IV and enteral contrast (via G-tube and J-tube)  - continue abx  - continue cycled TPN  - Nothing per J -tube  - Vent G tube      Neuro/Pain: Tylenol, baclofen, flexeril, enzo, oxy, dilaudid   CV: N/A  PULM: encourage IS.  GI/FEN: TPN. Reg Diet for comfort  : Voiding independently  Heme: N/A  ID: Follow WBC. Continue Zosyn. Added Vancomycin today.  Endocrine: SSI  Other: N/A  Lines: G Tube for decompression. Ok to give meds via G Tube.               J tube to gravity. Nothing via J tube.   Activity: as tolerated.  Ppx: LVNX  Dispo: Pending WBC, J tube, CT scan. Will go home on TPN.    Clinically Significant Risk Factors        # Hyperkalemia: Highest K = 5.9 mmol/L in last 2 days, will monitor as appropriate  # Hyponatremia: Lowest Na = 131 mmol/L in last 2 days, will monitor as appropriate  # Hypochloremia: Lowest Cl = 97 mmol/L in last 2 days, will monitor as appropriate  # Hypocalcemia: Lowest Ca = 8.4 mg/dL in last 2 days, will monitor and replace as appropriate     # Hypoalbuminemia: Lowest albumin = 3.1 g/dL at 10/14/2024  7:34 AM, will monitor as appropriate                # Moderate Malnutrition: based on nutrition assessment    # Financial/Environmental Concerns: none           Patient seen with Dr. Esteban and discussed with   Sarina.    Keith Hernandez MD  General Surgery PGY-1  Pager: [phone]

## 2024-10-21 NOTE — PLAN OF CARE
"BP 95/67 (BP Location: Left arm)   Pulse 100   Temp 98.1  F (36.7  C) (Oral)   Resp 18   Ht 1.727 m (5' 8\")   Wt 61.4 kg (135 lb 6.4 oz)   SpO2 98%   BMI 20.59 kg/m      [id number]    Patient A&Ox4, on room air, afebrile, soft BP but OVSS, independent. Full liquid diet tolerated well with TPN running at 131 mL/hr and lipids infusing at 20.8 mL/hr. Pain stated Voiding spontaneously with good output from colostomy. Pain stated 8/10 but controlled with oxycodone given x1, atarax given x1, and toradol given x1. Ostomy bag for J tube was leaking and patient stated it \"burned\", so the ostomy was changed, no further issues reported. Patient walked in hallways while unhooked from infusions. Continue POC.   "

## 2024-10-21 NOTE — PLAN OF CARE
Goal Outcome Evaluation:    Assumed cares [id number]    A+Ox4. VSS on RA. Midline incision JACKIE. J-tube pouched, with minimal drainage. G-tube clamped after meds, vented intermittently. All meds thru g-tube. Diet switched to CLD. Abd CT completed. Colostomy with thin dark/green OP. Up ad koffi. Continue with current plan of care.

## 2024-10-21 NOTE — PLAN OF CARE
Goal Outcome Evaluation:      Plan of Care Reviewed With: patient    Overall Patient Progress: no change    Outcome Evaluation: Patient started off the day with significant nausea and occasional vomiting. PRN antiemetic given per eMAR and fluids minimized with meds through G-tube. Patient has since had relief from nausea and vomiting. Patient also had severe abdominal pain around the time her ostomy appliance was being changed. PRN oxycodone given via G-tube and patient reported relief to a pain level of 4/10, which she endorses is adequate for her as she has quite continuous pain. CT planned for today; CT tech confirmed with writer that all contrast will be administered on the CT table. Patient had some time to rest in bed this afternoon; she also had a visitor. Patient received a 500 mL NS bolus for supplemental hydration.    Neuro: A&O x 4, able to make needs known, using call light appropriately.  Respiratory: SpO2 above 92% on RA. Lung sounds clear however diminished in the bilateral lower lobes.  Cardiac: Intermittent tachycardia, otherwise WDL. No associated symptoms noted by patient with these bouts of tachycardia.  GI/: Patient up to bathroom independently and voiding in toilet hat. Good UOP. Stool coming through ostomy. Patient continent of bladder.  Lines: Double lumen CVC (Tunneled/Ann line in the right internal jugular). Both lumens heparin locked when not in use this shift.  Drains: Colostomy in the RLQ. J-tube in the LUQ. G-tube in the RUQ.  Wounds: Midline incision to the abdomen. Closed with staples and open to air. No drainage noted.  Psychosocial: Patient cooperative and calm. No psychosocial concerns at this time.  Last vitals: Temp: 98.1  F (36.7  C) Temp src: Oral BP: 110/61 Pulse: 100   Resp: 16 SpO2: 99 % O2 Device: None (Room air)     Addendum: Provider notified of most recent potassium of 5.4. Provider acknowledged this and said that they spoke pharmacy to adjust TPN formula  accordingly.    Plan: Continue to manage patient's nausea and pain. CT did not have a time set for when they would come up for patient and said they will call back when they are ready to send transport. Please watch for this call.

## 2024-10-21 NOTE — PROGRESS NOTES
"Brief Surgery Crossover Note    Paged by RN Debbie Stubbs at 5:13am - Pt having nausea, had 2 small <10cc emesis overnight associated with heartburn while taking compazine, ativan and phenergan for nausea.   She denies abdominal pain at this time but reports intermittent cramping. She states that she has a history of heartburn for which she takes Tums at home. Denies chest pain or shortness of breath.     /65 (BP Location: Left arm)   Pulse 97   Temp 98.4  F (36.9  C) (Oral)   Resp 16   Ht 1.727 m (5' 8\")   Wt 61.4 kg (135 lb 6.4 oz)   SpO2 98%   BMI 20.59 kg/m      General: alert and oriented, in no acute distress  CV: regular rate and rhythm, palpable peripheral pulses, no edema   Resp: no increased work of breathing   GI: soft, non-distended, gas and liquid output in ileostomy, J tube with bilious output, G tube vented, no focal tenderness, no guarding or rigidity   Extremities: no significant pitting edema     -Prn Tums added for heartburn   -Continue antiemetics ordered by primary colorectal surgery team    Herminia Garza MD  General Surgery PGY-1    **For on call schedules and contact information, please refer to [facility]**      "

## 2024-10-21 NOTE — PHARMACY-VANCOMYCIN DOSING SERVICE
"Pharmacy Vancomycin Note  Date of Service 2024  Patient's  1965   59 year old, female    Indication: Abscess  Day of Therapy: 2  Current vancomycin regimen:  1500 mg IV x1 dose then 1000 mg IV q12h  Current vancomycin monitoring method: AUC  Current vancomycin therapeutic monitoring goal: 400-600 mg*h/L    InsightRX Prediction of Current Vancomycin Regimen  Loading dose: N/A  Regimen: 1000 mg IV every 12 hours.  Start time: 09:10 on 10/21/2024  Exposure target: AUC24 (range)400-600 mg/L.hr   AUC24,ss: 416 mg/L.hr  Probability of AUC24 > 400: 58 %  Ctrough,ss: 10.8 mg/L  Probability of Ctrough,ss > 20: 1 %  Probability of nephrotoxicity (Lodise ALEKSANDAR ): 6 %      Current estimated CrCl = Estimated Creatinine Clearance: 90.1 mL/min (based on SCr of 0.66 mg/dL).    Creatinine for last 3 days  10/19/2024:  4:18 AM Creatinine 0.58 mg/dL  10/20/2024:  5:37 AM Creatinine 0.53 mg/dL  10/21/2024:  6:21 AM Creatinine 0.66 mg/dL    Recent Vancomycin Levels (past 3 days)  10/21/2024:  6:21 AM Vancomycin 8.8 ug/mL    Vancomycin IV Administrations (past 72 hours)                     vancomycin (VANCOCIN) 1,000 mg in 200 mL dextrose intermittent infusion (mg) 1,000 mg New Bag 10/20/24 2110    vancomycin (VANCOCIN) 1,500 mg in 0.9% NaCl 265 mL intermittent infusion (mg) 1,500 mg New Bag 10/20/24 0839                    Nephrotoxins and other renal medications (From now, onward)      Start     Dose/Rate Route Frequency Ordered Stop    10/21/24 0900  vancomycin (VANCOCIN) 1,250 mg in 0.9% NaCl 262.5 mL intermittent infusion        Placed in \"Followed by\" Linked Group    1,250 mg  over 90 Minutes Intravenous EVERY 12 HOURS 10/21/24 0757      10/20/24 1630  piperacillin-tazobactam (ZOSYN) 3.375 g vial to attach to  mL bag        Note to Pharmacy: For [facility], [facility] and [facility]: For Zosyn-naive patients, use the \"Zosyn initial dose + extended infusion\" order panel.    3.375 g  over 30 Minutes Intravenous EVERY 6 HOURS " 10/20/24 1437 10/22/24 1629    10/16/24 1537  ketorolac (TORADOL) injection 30 mg         30 mg Intravenous EVERY 6 HOURS PRN 10/16/24 1537 10/21/24 1536               Contrast Orders - past 72 hours (72h ago, onward)      None            Interpretation of levels and current regimen:  Vancomycin level is reflective of -600.  Of note this was an early level as it was drawn after only 2 doses of vancomycin.     Has serum creatinine changed greater than 50% in last 72 hours: No    Urine output:  unable to determine, about 1500 mL out charted yesterday.     Renal Function: appears Stable from SCr and RN states U/O appeared OK this AM, however K, Mg and phos labs as well as BUN all creeping up today so more concern for kidney function.  Will monitor closely in upcoming 24-48 hr.     InsightRX Prediction of Planned New Vancomycin Regimen  Same as above.       Plan:  Continue Current Dose  Vancomycin monitoring method: AUC  Vancomycin therapeutic monitoring goal: 400-600 mg*h/L  Pharmacy will check vancomycin levels as appropriate in 1-3 Days.  Serum creatinine levels will be ordered daily for the first week of therapy and at least twice weekly for subsequent weeks.    Jairo Winston, [facility]

## 2024-10-21 NOTE — PLAN OF CARE
Goal Outcome Evaluation:      Plan of Care Reviewed With: patient    Overall Patient Progress: no change      PMH DM type 1, chronic pancreatitis, gastroparesis, s/p prior pancreatectomy with auto islet transplant in 2016, chronic abdominal pain on buprenorphine, GJ tube, J for feeds and G for decompression and needs to vent 75% of the time, thrice weekly TPN on Sun-Tues-Thurs for slow transit constipation and gastroparesis. Now SP ex lap, BAUDILIO, SBR, revision of J tube and TAC with ileorectal anastomosis and DLI on 10/11.       AVSS.  99% RA.  Pt denies pain.    Nausea overnight - PRN compazine x1, then pt had 2 small emesis - MD aware.  1x dose Ativan IV given with relief.  Tums x1 this AM.    PICC infusing TPN/Lip, ATBX as ordered.  Midline incision with staples JACKIE.  Jtube - leaking around tube - pouched.  Gtube to gravity, clamped with meds.  Colostomy intact, liquid stool output.  Voiding spont, good vols.  Cont with POC.

## 2024-10-21 NOTE — PROGRESS NOTES
"[facility] Nurse Inpatient Assessment     Consulted for: Diverting loop ileostomy  10/18: new consult for J tube leakage  10/21 summary:  pain around J tube site likely mostly related to suture wound.  Improvement of seal around the tube with stabilization.  Anticipate that will be able to discontinue pouching within this week  Patient History (according to provider note(s):      \"60 y/o female has a past medical history of Allergic rhinitis, cause unspecified, Allergy to other foods, Arthritis, Choledocholithiasis, Chronic abdominal pain, Chronic constipation, Chronic nausea, Chronic pancreatitis (H), Degeneration of lumbar or lumbosacral intervertebral disc, Esophageal reflux, Gastroparesis, Hiatal hernia, History of pituitary adenoma, Hypothyroidism, Migraines, Mild hyperlipidemia, On tube feeding diet, Pancreatic disease, PONV (postoperative nausea and vomiting), Portacath in place, Type 1 diabetes mellitus without complication (H) (5/9/2022), and Unspecified hearing loss.\"     Assessment:      Tube type and location:  J tube in LUQ    Last photo 10/18/24  History: placed in OR 10/11/24.  10/17/24 CT scan showed J tube in good position   Current Tube Securement: bumper sutured to skin   16 Fr tube with 1 lumens   Leakage around tube: small  Description of leakage/drainage:  bilious  Insertion site assessment:  approximately 0.1 cm gap between the tube and skin  Peritubular skin assessment:  bright red Erythema extending up to 0.3 cm from insertion site  Wound at 6 o'clock where the bumper had been sutured to the skin: 0.5 x 0.3 x 0.1 cm with pink dermal base     Palpation of the wound bed: normal      Wound Drainage: none     Description of drainage: none  ?? Temperature? normal   Pain: moderate, burning  Pain interventions prior to dressing change: patient tolerated well  STATUS: improving seal around the tube.  Pain likely related to drainage on the suture " wound  Supplies ordered: gathered       Assessment of new end Ileostomy:  Diagnosis Pertinent to Stoma:  Slow transit constipation       Surgery Date: 10/11/24  Sur[facility]: [facility]  Pouching system in place on assessment today: Leandro two piece, cut to fit, flat, and barrier ring   Pouch barrier status: Minimal melting  Pouch last changed/wear time: 1 day  Reason for pouch change today: routine schedule  Effectiveness of current pouching/ supply plan: Effective  Change made with ostomy management today: No  Pouching system in place after visit today: Leandro 2 piece flat cut to fit with barrier ring    Supplies: gathered, at bedside, supplies stored on unit, and discussed with patient     Stoma location: RLQ  Stoma size: 1 1/4 inches  Stoma appearance: red, round, moist, edematous, and protruberant  Mucocutaneous junction:  intact  Peristomal complication(s): none  Output: watery and yellow  Output volume emptied during visit: 75 ml  Abdominal assessment: Firm  Surgical site(s): open to air and staples intact  NG still in place? No  Pain: Sharp  Is patient still on a PCA? No    Ostomy education assessment:  Participant of teaching session today: patient   Education completed today: Pouching system assessment , Pouch change return demonstration, Pouch emptying return demonstration, Importance of hydration, When to seek medical attention, Odor/flatus management , Infection prevention/hygiene , Hernia prevention, Lifestyle adjustments , and Discharge instructions  Educational materials/methods: Verbal, Written, FOD [place] education hand out, and Addressed patient/caregiver questions/concerns  Education still needed: Discharge instructions  Learning Comprehension:   Psychosocial assessment: is a retired  and has had to manage TPN, drains at home with home care assist   Patient readiness for education today: attentive and active participation  Following visit: patient  is able to  "demonstrate;         1. How to empty their pouch? Yes and Independent        2. How to change their pouch? Yes and with minimal assist        3. How to read and record intake and output correctly? Yes and Demo provided   Preparation for discharge completed: Placed prescription recommendations in discharge navigator for MD to sign, Ensured patient has extra supplies for discharge, Discussed how to order supplies after discharge , Ordered samples from  after gaining consent from patient/caregiver, Discussed how and when to make an outpatient WO nurse appointment after discharge, Prepared for discharge home with home care, and Discuss signs/symptoms of when to seek medical attention  Preparation for discharge still needed: none  Pt support system on discharge: spouse  WOC recommend home care? Yes  Face to face time: 45 minutes    Treatment Plan:     J tube site:    Pouching system:  2 piece flat 57 mm barrier with urostomy pouch  -cuauhtemoc barrier ring under the barrier.   J tube left open and draining into the urostomy pouch    Change if leaks using the same supplies but cut the barrier to the size of the bumper.  If remains intact, to be changed by WO on Monday    RLQ Ileostomy pouching plan:   Pouching system: ostomy supplies pouches: Leandro 57 FECAL ([id number]) ostomy supplies barrier: [place] 57mm FLAT ([id number])  Accessories used: WO ostomy accessories: 2\" Cera Barrier Ring ([id number])   Frequency of pouch changes: PRN leakage and Three times a week  WOC follow up plan: Twice weekly  Bedside RN interventions: Change pouch PRN if leaking using the supplies above, Empty pouch when 1/3 to 1/2 full, ensure to clean pouch outlet after emptying to prevent odor, Notify WOC for ongoing pouch leakage, Document stoma appearance and output volume, color, and consistency every shift, and Encourage patient to empty pouch with assist     Outpatient plan:  Request the following supplies:      Leandro    2 piece flat " wafer 57mm  # [id number]                           2 piece opaque Fecal pouch without Filter 57mm- # 38857  Accessories  2  barrier ring #4004     Adapt powder #7680    No sting film barrier # 7634                           Adapt universal adhesive remover #5186                          Adapt M-9 Spray room deodorizer #4222                           Leandro barrier extenders #02070                                 Change your pouch 2 times a week, more often if leaking.    DATA:     Current support surface: Standard  Standard gel mattress (Isoflex)  Containment of urine/stool: Ileostomy pouch  BMI: Body mass index is 20.66 kg/m .   Active diet order: Orders Placed This Encounter      NPO for Medical/Clinical Reasons Except for: NPO but receiving PN     Output: I/O last 3 completed shifts:  In: 1641.4 [I.V.:100]  Out: 4070 [Urine:1550; Emesis/NG output:1295; Stool:1225]     Labs:   Recent Labs   Lab 10/21/24  0621   ALBUMIN 3.3*   PREALB 17.0*   HGB 8.6*   INR 1.12   WBC 19.1*     Pressure injury risk assessment:   Sensory Perception: 4-->no impairment  Moisture: 4-->rarely moist  Activity: 3-->walks occasionally  Mobility: 3-->slightly limited  Nutrition: 3-->adequate  Friction and Shear: 3-->no apparent problem  Martinez Score: 20

## 2024-10-22 ENCOUNTER — APPOINTMENT (OUTPATIENT)
Dept: PHYSICAL THERAPY | Facility: CLINIC | Age: 59
End: 2024-10-22
Attending: COLON & RECTAL SURGERY
Payer: COMMERCIAL

## 2024-10-22 ENCOUNTER — APPOINTMENT (OUTPATIENT)
Dept: INTERVENTIONAL RADIOLOGY/VASCULAR | Facility: CLINIC | Age: 59
End: 2024-10-22
Attending: PHYSICIAN ASSISTANT
Payer: COMMERCIAL

## 2024-10-22 LAB
ANION GAP SERPL CALCULATED.3IONS-SCNC: 12 MMOL/L (ref 7–15)
BUN SERPL-MCNC: 19.3 MG/DL (ref 6–20)
CALCIUM SERPL-MCNC: 8.6 MG/DL (ref 8.8–10.4)
CHLORIDE SERPL-SCNC: 103 MMOL/L (ref 98–107)
CREAT SERPL-MCNC: 0.58 MG/DL (ref 0.51–0.95)
EGFRCR SERPLBLD CKD-EPI 2021: >90 ML/MIN/1.73M2
ERYTHROCYTE [DISTWIDTH] IN BLOOD BY AUTOMATED COUNT: 13.3 % (ref 10–15)
GLUCOSE BLDC GLUCOMTR-MCNC: 143 MG/DL (ref 70–99)
GLUCOSE BLDC GLUCOMTR-MCNC: 147 MG/DL (ref 70–99)
GLUCOSE BLDC GLUCOMTR-MCNC: 151 MG/DL (ref 70–99)
GLUCOSE BLDC GLUCOMTR-MCNC: 174 MG/DL (ref 70–99)
GLUCOSE BLDC GLUCOMTR-MCNC: 202 MG/DL (ref 70–99)
GLUCOSE SERPL-MCNC: 168 MG/DL (ref 70–99)
HCO3 SERPL-SCNC: 22 MMOL/L (ref 22–29)
HCT VFR BLD AUTO: 26.9 % (ref 35–47)
HGB BLD-MCNC: 8.2 G/DL (ref 11.7–15.7)
MAGNESIUM SERPL-MCNC: 2.3 MG/DL (ref 1.7–2.3)
MCH RBC QN AUTO: 29.3 PG (ref 26.5–33)
MCHC RBC AUTO-ENTMCNC: 30.5 G/DL (ref 31.5–36.5)
MCV RBC AUTO: 96 FL (ref 78–100)
PHOSPHATE SERPL-MCNC: 3.8 MG/DL (ref 2.5–4.5)
PLATELET # BLD AUTO: 955 10E3/UL (ref 150–450)
POTASSIUM SERPL-SCNC: 4.3 MMOL/L (ref 3.4–5.3)
RBC # BLD AUTO: 2.8 10E6/UL (ref 3.8–5.2)
SODIUM SERPL-SCNC: 137 MMOL/L (ref 135–145)
WBC # BLD AUTO: 19.4 10E3/UL (ref 4–11)

## 2024-10-22 PROCEDURE — 0W9J30Z DRAINAGE OF PELVIC CAVITY WITH DRAINAGE DEVICE, PERCUTANEOUS APPROACH: ICD-10-PCS | Performed by: STUDENT IN AN ORGANIZED HEALTH CARE EDUCATION/TRAINING PROGRAM

## 2024-10-22 PROCEDURE — B4185 PARENTERAL SOL 10 GM LIPIDS: HCPCS | Performed by: COLON & RECTAL SURGERY

## 2024-10-22 PROCEDURE — 250N000013 HC RX MED GY IP 250 OP 250 PS 637

## 2024-10-22 PROCEDURE — 80048 BASIC METABOLIC PNL TOTAL CA: CPT | Performed by: STUDENT IN AN ORGANIZED HEALTH CARE EDUCATION/TRAINING PROGRAM

## 2024-10-22 PROCEDURE — 250N000013 HC RX MED GY IP 250 OP 250 PS 637: Performed by: STUDENT IN AN ORGANIZED HEALTH CARE EDUCATION/TRAINING PROGRAM

## 2024-10-22 PROCEDURE — C1769 GUIDE WIRE: HCPCS

## 2024-10-22 PROCEDURE — 250N000011 HC RX IP 250 OP 636

## 2024-10-22 PROCEDURE — 250N000011 HC RX IP 250 OP 636: Performed by: COLON & RECTAL SURGERY

## 2024-10-22 PROCEDURE — 250N000009 HC RX 250: Performed by: COLON & RECTAL SURGERY

## 2024-10-22 PROCEDURE — 84100 ASSAY OF PHOSPHORUS: CPT | Performed by: COLON & RECTAL SURGERY

## 2024-10-22 PROCEDURE — 250N000009 HC RX 250

## 2024-10-22 PROCEDURE — 99232 SBSQ HOSP IP/OBS MODERATE 35: CPT | Mod: 24 | Performed by: STUDENT IN AN ORGANIZED HEALTH CARE EDUCATION/TRAINING PROGRAM

## 2024-10-22 PROCEDURE — 250N000011 HC RX IP 250 OP 636: Performed by: PHYSICIAN ASSISTANT

## 2024-10-22 PROCEDURE — 120N000002 HC R&B MED SURG/OB UMMC

## 2024-10-22 PROCEDURE — 250N000013 HC RX MED GY IP 250 OP 250 PS 637: Performed by: SURGERY

## 2024-10-22 PROCEDURE — C1887 CATHETER, GUIDING: HCPCS

## 2024-10-22 PROCEDURE — 272N000500 HC NEEDLE CR2

## 2024-10-22 PROCEDURE — 258N000003 HC RX IP 258 OP 636: Performed by: SURGERY

## 2024-10-22 PROCEDURE — 99152 MOD SED SAME PHYS/QHP 5/>YRS: CPT | Mod: GC | Performed by: STUDENT IN AN ORGANIZED HEALTH CARE EDUCATION/TRAINING PROGRAM

## 2024-10-22 PROCEDURE — 250N000009 HC RX 250: Performed by: STUDENT IN AN ORGANIZED HEALTH CARE EDUCATION/TRAINING PROGRAM

## 2024-10-22 PROCEDURE — 250N000012 HC RX MED GY IP 250 OP 636 PS 637: Performed by: COLON & RECTAL SURGERY

## 2024-10-22 PROCEDURE — 49406 IMAGE CATH FLUID PERI/RETRO: CPT | Mod: 74

## 2024-10-22 PROCEDURE — 250N000013 HC RX MED GY IP 250 OP 250 PS 637: Performed by: COLON & RECTAL SURGERY

## 2024-10-22 PROCEDURE — 85027 COMPLETE CBC AUTOMATED: CPT | Performed by: STUDENT IN AN ORGANIZED HEALTH CARE EDUCATION/TRAINING PROGRAM

## 2024-10-22 PROCEDURE — 99152 MOD SED SAME PHYS/QHP 5/>YRS: CPT

## 2024-10-22 PROCEDURE — 97116 GAIT TRAINING THERAPY: CPT | Mod: GP

## 2024-10-22 PROCEDURE — 49406 IMAGE CATH FLUID PERI/RETRO: CPT | Mod: 53 | Performed by: STUDENT IN AN ORGANIZED HEALTH CARE EDUCATION/TRAINING PROGRAM

## 2024-10-22 PROCEDURE — 83735 ASSAY OF MAGNESIUM: CPT | Performed by: COLON & RECTAL SURGERY

## 2024-10-22 PROCEDURE — 250N000011 HC RX IP 250 OP 636: Performed by: SURGERY

## 2024-10-22 RX ORDER — FAMOTIDINE 40 MG/5ML
20 POWDER, FOR SUSPENSION ORAL 2 TIMES DAILY
Status: DISCONTINUED | OUTPATIENT
Start: 2024-10-22 | End: 2024-10-28 | Stop reason: HOSPADM

## 2024-10-22 RX ORDER — NALOXONE HYDROCHLORIDE 0.4 MG/ML
0.4 INJECTION, SOLUTION INTRAMUSCULAR; INTRAVENOUS; SUBCUTANEOUS
Status: DISCONTINUED | OUTPATIENT
Start: 2024-10-22 | End: 2024-10-22

## 2024-10-22 RX ORDER — NALOXONE HYDROCHLORIDE 0.4 MG/ML
0.2 INJECTION, SOLUTION INTRAMUSCULAR; INTRAVENOUS; SUBCUTANEOUS
Status: DISCONTINUED | OUTPATIENT
Start: 2024-10-22 | End: 2024-10-22

## 2024-10-22 RX ORDER — PIPERACILLIN SODIUM, TAZOBACTAM SODIUM 3; .375 G/15ML; G/15ML
3.38 INJECTION, POWDER, LYOPHILIZED, FOR SOLUTION INTRAVENOUS EVERY 6 HOURS
Status: DISCONTINUED | OUTPATIENT
Start: 2024-10-22 | End: 2024-10-25

## 2024-10-22 RX ORDER — FENTANYL CITRATE 50 UG/ML
25-50 INJECTION, SOLUTION INTRAMUSCULAR; INTRAVENOUS EVERY 5 MIN PRN
Status: DISCONTINUED | OUTPATIENT
Start: 2024-10-22 | End: 2024-10-22

## 2024-10-22 RX ORDER — FLUMAZENIL 0.1 MG/ML
0.2 INJECTION, SOLUTION INTRAVENOUS
Status: DISCONTINUED | OUTPATIENT
Start: 2024-10-22 | End: 2024-10-22

## 2024-10-22 RX ADMIN — PROMETHAZINE HYDROCHLORIDE 25 MG: 25 INJECTION, SOLUTION INTRAMUSCULAR; INTRAVENOUS at 16:24

## 2024-10-22 RX ADMIN — FAMOTIDINE 20 MG: 40 POWDER, FOR SUSPENSION ORAL at 20:03

## 2024-10-22 RX ADMIN — BACLOFEN 10 MG: 10 TABLET ORAL at 09:01

## 2024-10-22 RX ADMIN — PANCRELIPASE 3 CAPSULE: 120000; 24000; 76000 CAPSULE, DELAYED RELEASE PELLETS ORAL at 09:04

## 2024-10-22 RX ADMIN — PIPERACILLIN AND TAZOBACTAM 3.38 G: 3; .375 INJECTION, POWDER, LYOPHILIZED, FOR SOLUTION INTRAVENOUS at 10:01

## 2024-10-22 RX ADMIN — TRAZODONE HYDROCHLORIDE 50 MG: 50 TABLET ORAL at 22:14

## 2024-10-22 RX ADMIN — LORAZEPAM 1 MG: 1 TABLET ORAL at 20:13

## 2024-10-22 RX ADMIN — Medication 60 MG: at 20:03

## 2024-10-22 RX ADMIN — ACETAMINOPHEN 1000 MG: 325 SOLUTION ORAL at 11:58

## 2024-10-22 RX ADMIN — ENOXAPARIN SODIUM 40 MG: 40 INJECTION SUBCUTANEOUS at 16:33

## 2024-10-22 RX ADMIN — MIDAZOLAM 0.5 MG: 1 INJECTION INTRAMUSCULAR; INTRAVENOUS at 13:15

## 2024-10-22 RX ADMIN — HEPARIN, PORCINE (PF) 10 UNIT/ML INTRAVENOUS SYRINGE 10 ML: at 17:41

## 2024-10-22 RX ADMIN — SMOFLIPID 250 ML: 6; 6; 5; 3 INJECTION, EMULSION INTRAVENOUS at 20:01

## 2024-10-22 RX ADMIN — BACLOFEN 10 MG: 10 TABLET ORAL at 16:19

## 2024-10-22 RX ADMIN — SUMATRIPTAN 6 MG: 6 INJECTION SUBCUTANEOUS at 20:27

## 2024-10-22 RX ADMIN — LIDOCAINE HYDROCHLORIDE 6 ML: 10 INJECTION, SOLUTION EPIDURAL; INFILTRATION; INTRACAUDAL; PERINEURAL at 13:14

## 2024-10-22 RX ADMIN — FENTANYL CITRATE 50 MCG: 50 INJECTION, SOLUTION INTRAMUSCULAR; INTRAVENOUS at 13:02

## 2024-10-22 RX ADMIN — FAMOTIDINE 20 MG: 10 INJECTION, SOLUTION INTRAVENOUS at 09:01

## 2024-10-22 RX ADMIN — MAGNESIUM SULFATE HEPTAHYDRATE: 500 INJECTION, SOLUTION INTRAMUSCULAR; INTRAVENOUS at 19:58

## 2024-10-22 RX ADMIN — LEVOTHYROXINE SODIUM 150 MCG: 75 TABLET ORAL at 09:01

## 2024-10-22 RX ADMIN — ACETAMINOPHEN 1000 MG: 325 SOLUTION ORAL at 17:42

## 2024-10-22 RX ADMIN — PANCRELIPASE 3 CAPSULE: 120000; 24000; 76000 CAPSULE, DELAYED RELEASE PELLETS ORAL at 17:44

## 2024-10-22 RX ADMIN — PIPERACILLIN AND TAZOBACTAM 3.38 G: 3; .375 INJECTION, POWDER, LYOPHILIZED, FOR SOLUTION INTRAVENOUS at 04:23

## 2024-10-22 RX ADMIN — BACLOFEN 10 MG: 10 TABLET ORAL at 20:03

## 2024-10-22 RX ADMIN — PIPERACILLIN AND TAZOBACTAM 3.38 G: 3; .375 INJECTION, POWDER, LYOPHILIZED, FOR SOLUTION INTRAVENOUS at 22:15

## 2024-10-22 RX ADMIN — VANCOMYCIN HYDROCHLORIDE 1000 MG: 1 INJECTION, SOLUTION INTRAVENOUS at 20:59

## 2024-10-22 RX ADMIN — PIPERACILLIN AND TAZOBACTAM 3.38 G: 3; .375 INJECTION, POWDER, LYOPHILIZED, FOR SOLUTION INTRAVENOUS at 16:19

## 2024-10-22 RX ADMIN — PROMETHAZINE HYDROCHLORIDE 25 MG: 25 INJECTION, SOLUTION INTRAMUSCULAR; INTRAVENOUS at 09:05

## 2024-10-22 RX ADMIN — MIDAZOLAM 1 MG: 1 INJECTION INTRAMUSCULAR; INTRAVENOUS at 13:02

## 2024-10-22 RX ADMIN — LORAZEPAM 1 MG: 1 TABLET ORAL at 11:58

## 2024-10-22 RX ADMIN — DOXAZOSIN 1 MG: 4 TABLET ORAL at 09:05

## 2024-10-22 RX ADMIN — PROCHLORPERAZINE EDISYLATE 10 MG: 5 INJECTION INTRAMUSCULAR; INTRAVENOUS at 06:40

## 2024-10-22 RX ADMIN — PROMETHAZINE HYDROCHLORIDE 25 MG: 25 INJECTION, SOLUTION INTRAMUSCULAR; INTRAVENOUS at 20:20

## 2024-10-22 RX ADMIN — VANCOMYCIN HYDROCHLORIDE 1000 MG: 1 INJECTION, SOLUTION INTRAVENOUS at 09:15

## 2024-10-22 RX ADMIN — ACETAMINOPHEN 1000 MG: 325 SOLUTION ORAL at 06:25

## 2024-10-22 RX ADMIN — Medication 50 MG: at 22:14

## 2024-10-22 RX ADMIN — FENTANYL CITRATE 25 MCG: 50 INJECTION, SOLUTION INTRAMUSCULAR; INTRAVENOUS at 13:15

## 2024-10-22 RX ADMIN — INSULIN GLARGINE 3 UNITS: 100 INJECTION, SOLUTION SUBCUTANEOUS at 16:30

## 2024-10-22 ASSESSMENT — ACTIVITIES OF DAILY LIVING (ADL)
ADLS_ACUITY_SCORE: 31
ADLS_ACUITY_SCORE: 29
ADLS_ACUITY_SCORE: 31
ADLS_ACUITY_SCORE: 29
ADLS_ACUITY_SCORE: 31
ADLS_ACUITY_SCORE: 31
ADLS_ACUITY_SCORE: 29
ADLS_ACUITY_SCORE: 31
ADLS_ACUITY_SCORE: 29
ADLS_ACUITY_SCORE: 29
ADLS_ACUITY_SCORE: 31
ADLS_ACUITY_SCORE: 29
ADLS_ACUITY_SCORE: 31
ADLS_ACUITY_SCORE: 29
ADLS_ACUITY_SCORE: 29

## 2024-10-22 ASSESSMENT — ANXIETY QUESTIONNAIRES: GAD7 TOTAL SCORE: 0

## 2024-10-22 NOTE — PROGRESS NOTES
Colorectal Surgery Progress Note  [facility]  POD#11      Subjective: some mild nausea.  Continues with watery colostomy output.  No further emesis since yesterday.  Had some jello last night.      Vitals:  Vitals:    10/21/24 2331 10/22/24 0422 10/22/24 0750 10/22/24 1108   BP: 109/69 106/73 111/70 (P) 104/68   BP Location: Left arm Left arm Left arm Left arm   Cuff Size:   Adult Regular Adult Regular   Pulse: 101 109     Resp: 18 16 16 (P) 16   Temp: 98.3  F (36.8  C) 98.1  F (36.7  C) 98.1  F (36.7  C) 98.2  F (36.8  C)   TempSrc: Oral Oral Oral Oral   SpO2: 98% 99% 98% (P) 100%   Weight:   60.8 kg (134 lb)    Height:         I/O:  I/O last 3 completed shifts:  In: 2810.75 [P.O.:60; I.V.:1065; NG/GT:160]  Out: 4250 [Urine:2450; Emesis/NG output:500; Stool:1300]    Physical Exam:  Gen: AAOx3, NAD  Pulm: Non-labored breathing  Abd: Soft, non-distended, appropriately tender, no guarding   Incision C/D/I   Stoma pink and viable with stool and gas in bag   G tube in place.               J tube in place, pouched. improved skin erosion around tube site - with ostomy appliance around it with bilious drainage.    Ext:  Warm and well-perfused    BMP  Recent Labs   Lab 10/22/24  1110 10/22/24  0418 10/22/24  0416 10/21/24  2336 10/21/24  2010 10/21/24  1824 10/21/24  1151 10/21/24  0759 10/21/24  0748 10/21/24  0621 10/20/24  1028 10/20/24  0537   NA  --   --  137  --   --  135  --  133*  --  131*  --  135   POTASSIUM  --   --  4.3  --   --  4.4  --  5.4*  --  5.9*  --  4.6   CHLORIDE  --   --  103  --   --  101  --  100  --  97*  --  101   CO2  --   --  22  --   --  23  --  23  --  22  --  25   BUN  --   --  19.3  --   --  16.4  --  26.3*  --  25.5*  --  22.2*   CR  --   --  0.58  --   --  0.67  --  0.63  --  0.66  --  0.53   * 143* 168* 183*   < > 142*   < > 187*   < > 491*   < > 209*   MAG  --   --  2.3  --   --   --   --  2.5*  --  2.7*  --  2.3   PHOS  --   --  3.8  --   --   --    --  3.8  --  6.6*  --  3.8    < > = values in this interval not displayed.     CBC  Recent Labs   Lab 10/22/24  0416 10/21/24  0621 10/20/24  0537 10/19/24  0418   WBC 19.4* 19.1* 16.9* 15.3*   HGB 8.2* 8.6* 8.5* 8.9*   HCT 26.9* 27.0* 26.9* 27.5*   * 872* 782* 672*         ASSESSMENT: This is a 59 year old female PMH DM type 1, chronic pancreatitis, gastroparesis, s/p prior pancreatectomy with auto islet transplant in 2016, chronic abdominal pain on buprenorphine, GJ tube, J for feeds and G for decompression and needs to vent 75% of the time, thrice weekly TPN on Sun-Tues-Thurs for slow transit constipation and gastroparesis. Now SP ex lap, BAUDILIO, SBR, revision of J tube and TAC with ileorectal anastomosis and DLI on 10/11.     Today:  - CT 10/21 showed increased SB dilation but no transition point, likely ileus.  Increased peripheral enhancement of pelvic fluid, likely hematoma but could also be abscess.    - apprec IR consultation for drain placement into pelvic abscess with cultures  - continue cycled TPN  - Nothing per J -tube  - Vent G tube      Neuro/Pain: Tylenol, baclofen, flexeril, enzo, oxy, dilaudid   CV: N/A  PULM: encourage IS.  GI/FEN: TPN. Reg Diet for comfort  : Voiding independently  Heme: N/A  ID: Follow WBC. Continue Zosyn. Added Vancomycin today.  Endocrine: SSI  Other: N/A  Lines: G Tube for decompression. Ok to give meds via G Tube.               J tube to gravity. Nothing via J tube.   Activity: as tolerated.  Ppx: LVNX  Dispo: likely at least 2-3 days to await new cultures from IR today.  Pending ROBF or at minimum tolerating prior to admission regimen.  Will go home on TPN - currently unclear if back to prior to admission TPN regimen vs a new TPN regimen.  Will likely have an IR drain on discharge.     Clinically Significant Risk Factors        # Hyperkalemia: Highest K = 5.9 mmol/L in last 2 days, will monitor as appropriate  # Hyponatremia: Lowest Na = 131 mmol/L in last 2 days,  will monitor as appropriate  # Hypochloremia: Lowest Cl = 97 mmol/L in last 2 days, will monitor as appropriate  # Hypocalcemia: Lowest Ca = 8.4 mg/dL in last 2 days, will monitor and replace as appropriate     # Hypoalbuminemia: Lowest albumin = 3.1 g/dL at 10/14/2024  7:34 AM, will monitor as appropriate                # Moderate Malnutrition: based on nutrition assessment    # Financial/Environmental Concerns: none         Nicole Man PA-C ..................10/22/2024   12:39 PM  Colon and Rectal Surgery    Patient was seen and discussed with Dr. Esteban    The above plan of care was performed and communicated to me by Dr. Branch    I spent 25 minute face-to-face or coordinating care of Dinora Mcghee. Over 50% of our time on the unit was spent counseling the patient and/or coordinating care as documented in the assessment and plan.     [id number] post op hospital visit

## 2024-10-22 NOTE — IR NOTE
Patient Name: Dinora Mcghee  Medical Record Number: [id number]  Today's Date: 10/22/2024    Procedure: Abscess drain placement however aborted d/t no clear fluid source  Proceduralist: Dr. Christin Barnett  Pathology present: labs ordered but no pocket of fluid for drain placement.    Procedure Start: 1312  Procedure end: 1330  Sedation medications administered: 75 mcg fentanyl, 1.5 mg versed    Report given to: Casa on   : n/a    Other Notes: Pt arrived to IR room CT2 from . Consent reviewed. Pt denies any questions or concerns regarding procedure. Pt positioned supine and monitored per protocol. Pt tolerated procedure without any noted complications. Pt transferred back to .

## 2024-10-22 NOTE — PROCEDURES
[facility]    Procedure: Pelvic drain placement    Date/Time: 10/22/2024 1:31 PM    Performed by: Jesus Barnett MD  Authorized by: Jesus Barnett MD  IR Fellow Physician:  Radiology Resident Physician: Jesus Barnett    Pre Procedure Diagnosis: Pelvic fluid collection  Post Procedure Diagnosis: No pelvic fluid collection    UNIVERSAL PROTOCOL   Site Marked: Yes  Prior Images Obtained and Reviewed:  Yes  Required items: Required blood products, implants, devices and special equipment available    Patient identity confirmed:  Verbally with patient  Patient was reevaluated immediately before administering moderate or deep sedation or anesthesia  Confirmation Checklist:  Patient's identity using two indicators  Time out: Immediately prior to the procedure a time out was called    Universal Protocol: the Joint Commission Universal Protocol was followed    Preparation: Patient was prepped and draped in usual sterile fashion      SEDATION  Patient Sedated: Yes    Vital signs: Vital signs monitored during sedation    Findings: No adequate pelvic collection to place a drain in.     Specimens: none    Procedural Complications: None    Condition: Stable    Plan: - 2 hrs bedrest  - remaining care per primary      PROCEDURE  Describe Procedure: CT scan of the abdomen showed no adequate collection for placement of a drain.   Length of time physician/provider present for 1:1 monitoring during sedation:  0-22 min

## 2024-10-22 NOTE — PROGRESS NOTES
Care Management Follow Up    Length of Stay (days): 11    Expected Discharge Date: 10/24/2024     Concerns to be Addressed: discharge planning     Patient plan of care discussed at interdisciplinary rounds: Yes     Anticipated Discharge Disposition: Home, Home Care, Home Infusion     Anticipated Discharge Services: Home Care  Anticipated Discharge DME: None (Resume: TF, TPN, G + J tube NEW: Ostomy)     Patient/family educated on Medicare website which has current facility and service quality ratings: no  Education Provided on the Discharge Plan:    Patient/Family in Agreement with the Plan:       Referrals Placed by CM/SW: Internal Clinic Care Coordination, Homecare  Private pay costs discussed: Not applicable     Discussed  Partnership in Safe Discharge Planning  document with patient/family: No      Handoff Completed: Yes, [facility] PCP: Internal handoff referral completed  Ambulatory care hand-in received 10/15 from:   Simin GALO RN Care Manager   [facility]   Phone: [phone]   Fax: [phone]      Additional Information:  Per H &P: s/p TAC and ileorectal anastomosis, diverting loop ileostomy on 10/11 for slow transit constipation  -Plan is to discharge home with resumption of home care/home infusion  -Patient's  can provide transport at time of discharge     Per am rounds/chart review 10/15  plan to start trickle feed per J tube  Anticipate slow advancement of diet     RNCC met briefly with Dinora at the bedside - she reports improved pain management, ostomy is starting to produce gas and stool. She reports that MARJORIE is as early as Friday 10/18.      Update 10/21:   Per MD Shama Hernandez at am rounds, patient had ongoing nausea/emesis, elevated WBC count, diet decreased to NPO. Plan for CT today. Remains on cycled TPN.   RNCC updated CARLYN Masterson liaison on MARJORIE 10/23  RNCC updated [facility] that patient is not medically ready. They will continue to follow, appreciated the  update.      Update 10/22:   Pelvic drain placement attempted in IR - not enough fluid to place  TPN continues, regular diet for comfort, nothing per J tube, venting G tube   Watery ileostomy output  RNCC updated [facility] liaisons and [facility] on MARJORIE     Current Services confirmed 10/14:  RNCC confirmed with CARLYN Benavides liaison, that patient is open to the following agencies. [facility] for her IV and enteral supplies, and [facility] for her nursing cares   Resumption of care orders placed for both home care and home infusion  Primary Care - Care Coordination Referral order placed per automatic BPA     [facility] ([facility]) (RN cares for G tube, J tube, Ann) (intake RN aware of new ostomy)                            Services Available   Home Health Services         Address   [address]  [place] [facility] [address]             Contact Information    [phone]         [facility] (TF and TPN)  [phone]  CARLYN Benavides liaison   [phone]  Teams      Next Steps:   Follow up in care rounds to confirm any changes in discharge needs  Update [facility] and [facility] as needed   Patient's  can provide transport home     NI Garcia  Care Management Department  Covering 5A: [phone] & 5C: [phone] (non-BMT)   Phone: [phone]  Teams  Vocera: weekdays 8:00 am - 4:30 pm.   See Vocera Care Team for off-day coverage

## 2024-10-22 NOTE — CONSULTS
"    Interventional Radiology  [facility] Consult Service Note  10/22/24   8:19 AM    Consult Requested: Drain    Recommendations/Plan:    Patient will be added to IR schedule on 10/22/24 for a CT-guided pelvic drain placement. Timing of procedure is TBD based on staffing/schedule and triage. Per plan, will assess for ultrasound-guided anterior drain placement, with possibility of trans-gluteal CT-guided drain placement if necessary.    This is a 59 year old female with history of pancreatogenic diabetes, status post TPAIT in 2016. She was admitted on 10/11/24 for exploratory laparotomy, BAUDILIO, colectomy, J-tube revision, and loop ileostomy. Patient now with increasing leukocytosis, with CT (10/21/24) demonstrating perirectal pelvic collection. Patient's team requesting drain placement. Team reports liquid diet yesterday, though she has been NPO this morning.    Orders for diagnostics to be entered by requesting team.    Labs WNL for procedure.   Preprocedural orders entered, as well as orders for procedure, NPO status, and pre procedure IV antibiotics.   Medications to be held include: Lovenox (last dose on 10/21/24).  Consent will be done prior to procedure.     Please contact the IR charge RN at [phone] for estimated time of procedure.     Case and imaging discussed with IR attending, Dr. Waller. Recommendations were reviewed with requesting team.        Pertinent Imaging Reviewed: CT (10/21/24)    Expected date of discharge:  TBD    Vitals:   /70 (BP Location: Left arm, Cuff Size: Adult Regular)   Pulse 109   Temp 98.1  F (36.7  C) (Oral)   Resp 16   Ht 1.727 m (5' 8\")   Wt 60.8 kg (134 lb)   SpO2 98%   BMI 20.37 kg/m      Pertinent Labs:   Lab Results   Component Value Date    WBC 19.4 (H) 10/22/2024    WBC 19.1 (H) 10/21/2024    WBC 16.9 (H) 10/20/2024    WBC 5.0 12/17/2020    WBC 6.2 09/14/2020    WBC 4.2 01/31/2020     Lab Results   Component Value Date    HGB 8.2 10/22/2024    HGB " 8.6 10/21/2024    HGB 8.5 10/20/2024    HGB 13.5 12/17/2020    HGB 14.3 09/14/2020    HGB 14.3 01/31/2020     Lab Results   Component Value Date     10/22/2024     10/21/2024     10/20/2024     12/17/2020     09/14/2020     01/31/2020     Lab Results   Component Value Date    INR 1.12 10/21/2024    INR 1.05 01/16/2018    PTT 29 01/07/2017     Lab Results   Component Value Date    POTASSIUM 4.3 10/22/2024    POTASSIUM 4.1 08/01/2022    POTASSIUM 3.5 12/17/2020        COVID-19 Antibody Results, Testing for Immunity           No data to display              COVID-19 PCR Results           No data to display                Roger Garrido PA-C  Interventional Radiology  Pager: [phone]

## 2024-10-22 NOTE — PROGRESS NOTES
Inpatient Diabetes Management Service: Daily Progress Note     HPI: Dinora Mcghee is a 59 year old with pancreatogenic diabetes s/p pancreatectomy and TPAIT in 2016,complicated by gastroparesis, as well as a comorbid history of HTN,HLD, Hypothyroidism, chronic nausea/and constipation, chronic malnutrition and TPN and Tube feeds dependent , who was admitted on 10/11/2024 for exploratory laparotomy, extensive lysis of adhesions, revision of J tube,and small bowel resection. Inpatient Diabetes Service consulted for management of diabetes in the setting of continuous TPN use and expected TF use in future. G and J tube on gravity          Assessment/Plan:     Assessment:   Pancreatogenic diabetes s/p pancreatectomy and TPAIT in 2016,went off insulin for about 4 years and now back on insulin , A1C-5.4% in 7/23/2024  S/p exploratory laparotomy, extensive lysis of adhesions, revision of J tube,and small bowel resection on 10/11/24  Chronic malnutrition, was on TPN X12 hour, 3 days/week and Tube feeds x 12 hours, 7 days/week at home, now tube feeds are off, TPN continuous  Poor oral intake  Continuous TPN related hyperglycemia  Gastroparesis  Abx for postoperative infection/Jtube leakage and cellulitis     Plan/Recommendations:    - Increase Regular insulin 20 --> 23 units with cycled TPN dextrose at 200 g (goal) = 0.115 units/gram dextrose.   - Lantus 3 units q 24 hrs for 24 hour at 1300  - Novolog carb coverage: 1 unit per 15 g cho TID AC and 1/20 prn snacks/supplements  - Novolog Correction Scale:   - 1:50 >140 at 08:00, 12:00 pm and 16:00 (when TF are off)  - 1:35 >140 at 20:00, 00:00 and 04:00 (when TF are running)  - BG monitoring: Every 4 hours.  - Hypoglycemia protocol    - Carb counting protocol    Discussion: BG overall trending ok, intermittently >180 mg/dL. Requiring a few units of correction overnight while cyclic TPN running. Will increase regular insulin with TPN today.      Copied  "forward from 10/20: Says she can count carbs but would have to review so she wants a fixed meal dose to discharge.      Inpatient Diabetes Management Team Discharge Instructions: (TENTATIVE)    Supplies and insulin to be ordered by Primary team:   Will need lantus pen as was on Levemir before- they will no longer be manufactoring Levemir after 12/2024.  Novolog pen  Yandy 3    Blood Glucose Checks: Three times daily before meals, and at bedtime. Has yandy 3 CGM. Please call if you have more than 2 blood sugar over 250 in one day, or any sugars less than 75.     Glucose Control Regimen:     Lantus insulin: 3 units every day at 1 pm    Regular insulin 20 units in TPN cycled over 12 hours with dextrose 200 g     Novolog insulin to cover carbohydrates with meals: ( \"fixed meal dose\":  2 units with each meal.  Do not give if do not eat meal ) If you want to try counting carbs again 1 unit of insulin for 15g of carb)    Novolog insulin correction scale: three times daily before meals and at bedtime (has Yandy)  Give correction insulin based on the following scale: *Give even if skipping a meal* at 8AM, noon, 5PM, bedtime    Do Not give Correction Insulin if BG less than 140  Do not give more frequently then every 4 hours or you will stack insulin and risk going low  Blood Glucose Before Meals, Add   Less than 140 0 units   140 -189  1 units   190 - 239 2 units   240 - 289 3 units   290 - 339 4 units    340 - 389  5 units   390 - 439 6 units   >/= 440 7 units       Endocrinology Outpatient follow up:    Patient should schedule your outpatient diabetes appointment 1-2 weeks from discharge with current provider (Dr. Melissa)     If you have urgent questions or concerns regarding your blood sugars or insulin, you may contact [phone] (the main hospital ). Ask to speak with the endocrinologist on call.      Discharge Planning: (tentative)  Medications: TBD  Test Claims: lantus as current levemir is being " discontinued  Education:  Unlikely to need, has given insulin before and used correction scale.   Outpatient Follow-up:  recommend  PCP Juan Jose Gomez M.D at the [facility], last seen on 7.22.2024     Please notify Inpatient Diabetes Service if changes are planned to steroids, nutrition, TPN/TF and anticipated procedures requiring prolonged NPO status.         Interval History/Review of Systems :   - The last 24 hours progress and nursing notes reviewed.  - Feeling much better today. No nausea or emesis since yesterday morning. Was able to eat a little last night (jello).   - CT yesterday (10/21/24) demonstrating perirectal pelvic collection. NPO for CT-guided pelvic drain placement today.     Planned Procedures/Surgeries:  CT-guided pelvic drain placement 10/22    Inpatient Glucose Control:       Recent Labs   Lab 10/22/24  0418 10/22/24  0416 10/21/24  2336 10/21/24  2010 10/21/24  1824 10/21/24  1535   * 168* 183* 138* 142* 149*             Medications Impacting Glycemia:   Steroids: None  D5W containing solutions/medications: None  Other medications impacting glucose: NOne         Nutrition:   Orders Placed This Encounter      Clear Liquid Diet  Prior to admission: was on TPN on Sunday, Tuesday and Thursday nights.  Also on enteral feeds  Supplements: none   TF: none. Was on 10/15/2024:  Amy Quan Peptide 1.5 (plain) @ 10 mL/hr via J-tube (only ran PM 10/15- 6AM 10/16).   TPN:  Continuous: dextrose 140 g (10/13) -->  dextrose 170 g (10/14-10/15) --> dextrose increased to 200 g (10/17 - 10/18) --> started cycle over 12 hours with same dextrose 200g at 8 pm at 10/19/2024... current.        Diabetes History: see full consult note for complete diabetes history   Diabetes Type and Duration: Since 2016, s/p pancreatectomy and TPAI     PTA Medication Regimen: Takes insulin levemir 3 units daily and novolog 1 unit if BG is 175-275, and 2 units if >275 up to every 4 hours as needed. Reports some times she keeps on   "giving correction whole night but other times does it once or not require it all.  Historical Diabetes Medications: As above     Glucose monitoring device and frequency: Checks with jim 3  Outpatient Diabetes Provider: Dr. Melissa (last visit 4/2024)       Physical Exam:   /73 (BP Location: Left arm)   Pulse 109   Temp 98.1  F (36.7  C) (Oral)   Resp 16   Ht 1.727 m (5' 8\")   Wt 61.6 kg (135 lb 14.4 oz)   SpO2 99%   BMI 20.66 kg/m    General:  well appearing, NAD, resting comfortably in bed.  Lungs: unlabored breathing on RA.  Mental Status:  Alert and oriented x3  Psych:  calm, cooperative, normal mood and affect          Data:     Lab Results   Component Value Date    A1C 5.4 07/23/2024    A1C 5.6 03/20/2024    A1C 5.4 02/17/2022    A1C 5.2 11/27/2021    A1C 5.5 09/18/2021    A1C 5.4 08/05/2021    A1C 5.7 05/12/2021    A1C 5.9 (A) 04/01/2021    A1C 5.5 12/17/2020    A1C 5.8 (H) 07/30/2020       ROUTINE IP LABS (Last four results)  BMP  Recent Labs   Lab 10/22/24  0418 10/22/24  0416 10/21/24  2336 10/21/24  2010 10/21/24  1824 10/21/24  1151 10/21/24  0759 10/21/24  0748 10/21/24  0621   NA  --  137  --   --  135  --  133*  --  131*   POTASSIUM  --  4.3  --   --  4.4  --  5.4*  --  5.9*   CHLORIDE  --  103  --   --  101  --  100  --  97*   KASSI  --  8.6*  --   --  8.4*  --  8.6*  --  8.4*   CO2  --  22  --   --  23  --  23  --  22   BUN  --  19.3  --   --  16.4  --  26.3*  --  25.5*   CR  --  0.58  --   --  0.67  --  0.63  --  0.66   * 168* 183* 138* 142*   < > 187*   < > 491*    < > = values in this interval not displayed.     CBC  Recent Labs   Lab 10/22/24  0416 10/21/24  0621 10/20/24  0537 10/19/24  0418   WBC 19.4* 19.1* 16.9* 15.3*   RBC 2.80* 2.77* 2.81* 2.92*   HGB 8.2* 8.6* 8.5* 8.9*   HCT 26.9* 27.0* 26.9* 27.5*   MCV 96 98 96 94   MCH 29.3 31.0 30.2 30.5   MCHC 30.5* 31.9 31.6 32.4   RDW 13.3 13.4 13.3 13.2   * 872* 782* 672*     Inpatient Diabetes Service will continue to " follow, please don't hesitate to contact the team with any questions or concerns.     Homa Valencia PA-C  Inpatient Diabetes Service  Pager: [phone]  Available on vocera    Plan discussed with patient, bedside RN, and primary team via this note.    To contact Inpatient Diabetes Service:     7 AM - 5 PM: Page the IDS GIGI following the patient that day (see filed or incomplete progress notes/consult notes under Endocrinology)    OR if uncertain of provider assignment: page job code [phone]    5 PM - 7 AM: First call after hours is to primary service.    For urgent after-hours questions, page job code for on call fellow: [phone]     I spent a total of 35 minutes on the date of the encounter doing prep/post-work, chart review, history and exam, documentation and further activities per the note including lab review, multidisciplinary communication, counseling the patient and/or coordinating care regarding acute hyper/hypoglycemic management, as well as discharge management and planning/communication.

## 2024-10-22 NOTE — PROVIDER NOTIFICATION
5A 5293 E.O. Pt requesting prn Robaxin for abdominal spasms, can we get that ordered? Thx [phone]    Outcome: spoke with provider, stick to current regimen considering all muscle relaxants and multimodal pain management

## 2024-10-22 NOTE — PROGRESS NOTES
"Brief Surgery Crossover Note    Hyperkalemia resolved, K 4.4.   Reviewed CT scan result and started CLD after discussing with colorectal fellow, Dr. Ovi Esteban.     /60 (BP Location: Left arm)   Pulse 101   Temp 98.1  F (36.7  C) (Oral)   Resp 16   Ht 1.727 m (5' 8\")   Wt 61.6 kg (135 lb 14.4 oz)   SpO2 99%   BMI 20.66 kg/m        Herminia Garza MD  General Surgery PGY-1    **For on call schedules and contact information, please refer to [facility]**      "

## 2024-10-22 NOTE — PLAN OF CARE
Goal Outcome Evaluation:      Plan of Care Reviewed With: patient    Overall Patient Progress: no changeOverall Patient Progress: no change    [id number]    Afebrile. Tachycardic, OVSS on RA. Pt c/o abdominal spasms, managed with scheduled baclofen and tylenol. Pt stating no pain this morning. PRN ativan given for anxiety. Pt denies sob, dizziness. Pt c/o some mild nausea this morning, prn compazine given. Q4h BS checks, 138, 183, 143, corrected per sliding scale. Cycled TPN and lipids infusing overnight into CVC. Dressing changed on CVC. Midline incision WDL. J-tube remains pouched with small amounts of bile leakage. G-tube clamped for medications, otherwise draining to gravity. Colostomy with watery greenish brown output. Pt voiding adequately. CLD, tolerating. UAL. Continue poc.

## 2024-10-22 NOTE — PRE-PROCEDURE
GENERAL PRE-PROCEDURE:   Procedure:  Pelvic drain placement  Date/Time:  10/22/2024 12:33 PM    Verbal consent obtained?: Yes    Written consent obtained?: Yes    Risks and benefits: Risks, benefits and alternatives were discussed    Consent given by:  Patient  Patient states understanding of procedure being performed: Yes    Patient's understanding of procedure matches consent: Yes    Procedure consent matches procedure scheduled: Yes    Expected level of sedation:  Moderate  Appropriately NPO:  Yes  ASA Class:  2  Mallampati  :  Grade 1- soft palate, uvula, tonsillar pillars, and posterior pharyngeal wall visible  Lungs:  Lungs clear with good breath sounds bilaterally  Heart:  Normal heart sounds and rate  History & Physical reviewed:  History and physical reviewed and no updates needed  Statement of review:  I have reviewed the lab findings, diagnostic data, medications, and the plan for sedation    Patient has waived DNR/DNI for the procedure.

## 2024-10-22 NOTE — PLAN OF CARE
"[id number]  /58 (BP Location: Right arm, Cuff Size: Adult Regular)   Pulse 99   Temp 98.4  F (36.9  C) (Oral)   Resp 18   Ht 1.727 m (5' 8\")   Wt 60.8 kg (134 lb)   SpO2 99%   BMI 20.37 kg/m      Afebrile, VSS.   No acute event.       NEURO: Alert and oriented x4.      RESPIRATORY: Room Air, denies dizziness, and SOB. Lungs sound, clear, equal bilateral.     CARDIO: VSS, denies dizziness, and extremity pain.      GI/: Denies N/V, BS active, BM via colonoscopy, void urine spontaneously without saving.       SKIN: Intact.      ACTIVITY: independent.      PAIN: yes, abdominal     DLA: CVC     BG: yes.      PLAN:     Continue monitoring.     * Italic, from provider's note.          "

## 2024-10-23 ENCOUNTER — APPOINTMENT (OUTPATIENT)
Dept: PHYSICAL THERAPY | Facility: CLINIC | Age: 59
End: 2024-10-23
Attending: COLON & RECTAL SURGERY
Payer: COMMERCIAL

## 2024-10-23 LAB
ANION GAP SERPL CALCULATED.3IONS-SCNC: 12 MMOL/L (ref 7–15)
BUN SERPL-MCNC: 22.4 MG/DL (ref 6–20)
CALCIUM SERPL-MCNC: 9.1 MG/DL (ref 8.8–10.4)
CHLORIDE SERPL-SCNC: 101 MMOL/L (ref 98–107)
CREAT SERPL-MCNC: 0.57 MG/DL (ref 0.51–0.95)
EGFRCR SERPLBLD CKD-EPI 2021: >90 ML/MIN/1.73M2
ERYTHROCYTE [DISTWIDTH] IN BLOOD BY AUTOMATED COUNT: 13.6 % (ref 10–15)
GLUCOSE BLDC GLUCOMTR-MCNC: 126 MG/DL (ref 70–99)
GLUCOSE BLDC GLUCOMTR-MCNC: 142 MG/DL (ref 70–99)
GLUCOSE BLDC GLUCOMTR-MCNC: 148 MG/DL (ref 70–99)
GLUCOSE BLDC GLUCOMTR-MCNC: 164 MG/DL (ref 70–99)
GLUCOSE BLDC GLUCOMTR-MCNC: 203 MG/DL (ref 70–99)
GLUCOSE SERPL-MCNC: 197 MG/DL (ref 70–99)
HCO3 SERPL-SCNC: 23 MMOL/L (ref 22–29)
HCT VFR BLD AUTO: 28.6 % (ref 35–47)
HGB BLD-MCNC: 9 G/DL (ref 11.7–15.7)
MAGNESIUM SERPL-MCNC: 2.3 MG/DL (ref 1.7–2.3)
MAGNESIUM SERPL-MCNC: 2.7 MG/DL (ref 1.7–2.3)
MCH RBC QN AUTO: 31.1 PG (ref 26.5–33)
MCHC RBC AUTO-ENTMCNC: 31.5 G/DL (ref 31.5–36.5)
MCV RBC AUTO: 99 FL (ref 78–100)
PHOSPHATE SERPL-MCNC: 4.3 MG/DL (ref 2.5–4.5)
PHOSPHATE SERPL-MCNC: 7.9 MG/DL (ref 2.5–4.5)
PLATELET # BLD AUTO: 1119 10E3/UL (ref 150–450)
POTASSIUM SERPL-SCNC: 4.5 MMOL/L (ref 3.4–5.3)
RBC # BLD AUTO: 2.89 10E6/UL (ref 3.8–5.2)
SODIUM SERPL-SCNC: 136 MMOL/L (ref 135–145)
VANCOMYCIN SERPL-MCNC: 8.2 UG/ML
WBC # BLD AUTO: 18.2 10E3/UL (ref 4–11)

## 2024-10-23 PROCEDURE — 250N000011 HC RX IP 250 OP 636: Performed by: SURGERY

## 2024-10-23 PROCEDURE — 250N000011 HC RX IP 250 OP 636

## 2024-10-23 PROCEDURE — 258N000003 HC RX IP 258 OP 636: Performed by: SURGERY

## 2024-10-23 PROCEDURE — 83735 ASSAY OF MAGNESIUM: CPT | Performed by: COLON & RECTAL SURGERY

## 2024-10-23 PROCEDURE — 85027 COMPLETE CBC AUTOMATED: CPT | Performed by: STUDENT IN AN ORGANIZED HEALTH CARE EDUCATION/TRAINING PROGRAM

## 2024-10-23 PROCEDURE — 250N000013 HC RX MED GY IP 250 OP 250 PS 637

## 2024-10-23 PROCEDURE — B4185 PARENTERAL SOL 10 GM LIPIDS: HCPCS | Performed by: COLON & RECTAL SURGERY

## 2024-10-23 PROCEDURE — 97110 THERAPEUTIC EXERCISES: CPT | Mod: GP | Performed by: PHYSICAL THERAPIST

## 2024-10-23 PROCEDURE — 250N000013 HC RX MED GY IP 250 OP 250 PS 637: Performed by: SURGERY

## 2024-10-23 PROCEDURE — 250N000009 HC RX 250: Performed by: COLON & RECTAL SURGERY

## 2024-10-23 PROCEDURE — 250N000011 HC RX IP 250 OP 636: Performed by: PHYSICIAN ASSISTANT

## 2024-10-23 PROCEDURE — 120N000002 HC R&B MED SURG/OB UMMC

## 2024-10-23 PROCEDURE — 84100 ASSAY OF PHOSPHORUS: CPT | Performed by: COLON & RECTAL SURGERY

## 2024-10-23 PROCEDURE — 97530 THERAPEUTIC ACTIVITIES: CPT | Mod: GP | Performed by: PHYSICAL THERAPIST

## 2024-10-23 PROCEDURE — 250N000009 HC RX 250: Performed by: STUDENT IN AN ORGANIZED HEALTH CARE EDUCATION/TRAINING PROGRAM

## 2024-10-23 PROCEDURE — G0463 HOSPITAL OUTPT CLINIC VISIT: HCPCS

## 2024-10-23 PROCEDURE — 80202 ASSAY OF VANCOMYCIN: CPT | Performed by: COLON & RECTAL SURGERY

## 2024-10-23 PROCEDURE — 250N000012 HC RX MED GY IP 250 OP 636 PS 637: Performed by: COLON & RECTAL SURGERY

## 2024-10-23 PROCEDURE — 250N000013 HC RX MED GY IP 250 OP 250 PS 637: Performed by: STUDENT IN AN ORGANIZED HEALTH CARE EDUCATION/TRAINING PROGRAM

## 2024-10-23 PROCEDURE — 80048 BASIC METABOLIC PNL TOTAL CA: CPT | Performed by: COLON & RECTAL SURGERY

## 2024-10-23 PROCEDURE — 97116 GAIT TRAINING THERAPY: CPT | Mod: GP | Performed by: PHYSICAL THERAPIST

## 2024-10-23 PROCEDURE — 250N000013 HC RX MED GY IP 250 OP 250 PS 637: Performed by: COLON & RECTAL SURGERY

## 2024-10-23 PROCEDURE — 250N000009 HC RX 250

## 2024-10-23 PROCEDURE — 99232 SBSQ HOSP IP/OBS MODERATE 35: CPT | Mod: 24 | Performed by: STUDENT IN AN ORGANIZED HEALTH CARE EDUCATION/TRAINING PROGRAM

## 2024-10-23 PROCEDURE — 250N000011 HC RX IP 250 OP 636: Performed by: COLON & RECTAL SURGERY

## 2024-10-23 RX ADMIN — FAMOTIDINE 20 MG: 40 POWDER, FOR SUSPENSION ORAL at 09:17

## 2024-10-23 RX ADMIN — SMOFLIPID 250 ML: 6; 6; 5; 3 INJECTION, EMULSION INTRAVENOUS at 21:48

## 2024-10-23 RX ADMIN — LEVOTHYROXINE SODIUM 150 MCG: 75 TABLET ORAL at 06:26

## 2024-10-23 RX ADMIN — PROMETHAZINE HYDROCHLORIDE 25 MG: 25 INJECTION, SOLUTION INTRAMUSCULAR; INTRAVENOUS at 16:35

## 2024-10-23 RX ADMIN — Medication 50 MG: at 22:15

## 2024-10-23 RX ADMIN — PANCRELIPASE 3 CAPSULE: 120000; 24000; 76000 CAPSULE, DELAYED RELEASE PELLETS ORAL at 09:17

## 2024-10-23 RX ADMIN — ACETAMINOPHEN 975 MG: 325 SOLUTION ORAL at 12:36

## 2024-10-23 RX ADMIN — HYDROXYZINE HYDROCHLORIDE 50 MG: 25 TABLET, FILM COATED ORAL at 00:31

## 2024-10-23 RX ADMIN — TRAZODONE HYDROCHLORIDE 50 MG: 50 TABLET ORAL at 22:32

## 2024-10-23 RX ADMIN — PROCHLORPERAZINE EDISYLATE 10 MG: 5 INJECTION INTRAMUSCULAR; INTRAVENOUS at 19:50

## 2024-10-23 RX ADMIN — BACLOFEN 10 MG: 10 TABLET ORAL at 09:20

## 2024-10-23 RX ADMIN — MAGNESIUM SULFATE HEPTAHYDRATE: 500 INJECTION, SOLUTION INTRAMUSCULAR; INTRAVENOUS at 21:48

## 2024-10-23 RX ADMIN — ACETAMINOPHEN 1000 MG: 325 SOLUTION ORAL at 18:10

## 2024-10-23 RX ADMIN — VANCOMYCIN HYDROCHLORIDE 1000 MG: 1 INJECTION, SOLUTION INTRAVENOUS at 10:17

## 2024-10-23 RX ADMIN — PIPERACILLIN AND TAZOBACTAM 3.38 G: 3; .375 INJECTION, POWDER, LYOPHILIZED, FOR SOLUTION INTRAVENOUS at 22:56

## 2024-10-23 RX ADMIN — SUMATRIPTAN 6 MG: 6 INJECTION SUBCUTANEOUS at 18:28

## 2024-10-23 RX ADMIN — LORAZEPAM 1 MG: 1 TABLET ORAL at 21:53

## 2024-10-23 RX ADMIN — PIPERACILLIN AND TAZOBACTAM 3.38 G: 3; .375 INJECTION, POWDER, LYOPHILIZED, FOR SOLUTION INTRAVENOUS at 11:25

## 2024-10-23 RX ADMIN — DOXAZOSIN 1 MG: 4 TABLET ORAL at 09:17

## 2024-10-23 RX ADMIN — FAMOTIDINE 20 MG: 40 POWDER, FOR SUSPENSION ORAL at 22:15

## 2024-10-23 RX ADMIN — ENOXAPARIN SODIUM 40 MG: 40 INJECTION SUBCUTANEOUS at 16:32

## 2024-10-23 RX ADMIN — HEPARIN, PORCINE (PF) 10 UNIT/ML INTRAVENOUS SYRINGE 5 ML: at 09:17

## 2024-10-23 RX ADMIN — Medication 60 MG: at 22:15

## 2024-10-23 RX ADMIN — PIPERACILLIN AND TAZOBACTAM 3.38 G: 3; .375 INJECTION, POWDER, LYOPHILIZED, FOR SOLUTION INTRAVENOUS at 04:40

## 2024-10-23 RX ADMIN — PANCRELIPASE 3 CAPSULE: 120000; 24000; 76000 CAPSULE, DELAYED RELEASE PELLETS ORAL at 18:07

## 2024-10-23 RX ADMIN — CYCLOBENZAPRINE HYDROCHLORIDE 10 MG: 5 TABLET, FILM COATED ORAL at 06:26

## 2024-10-23 RX ADMIN — ACETAMINOPHEN 1000 MG: 325 SOLUTION ORAL at 05:27

## 2024-10-23 RX ADMIN — PROMETHAZINE HYDROCHLORIDE 25 MG: 25 INJECTION, SOLUTION INTRAMUSCULAR; INTRAVENOUS at 09:19

## 2024-10-23 RX ADMIN — PIPERACILLIN AND TAZOBACTAM 3.38 G: 3; .375 INJECTION, POWDER, LYOPHILIZED, FOR SOLUTION INTRAVENOUS at 17:02

## 2024-10-23 RX ADMIN — PROMETHAZINE HYDROCHLORIDE 25 MG: 25 INJECTION, SOLUTION INTRAMUSCULAR; INTRAVENOUS at 22:18

## 2024-10-23 RX ADMIN — BACLOFEN 10 MG: 10 TABLET ORAL at 22:16

## 2024-10-23 RX ADMIN — BACLOFEN 10 MG: 10 TABLET ORAL at 15:14

## 2024-10-23 ASSESSMENT — ACTIVITIES OF DAILY LIVING (ADL)
ADLS_ACUITY_SCORE: 0
ADLS_ACUITY_SCORE: 29
ADLS_ACUITY_SCORE: 0

## 2024-10-23 NOTE — PLAN OF CARE
Goal Outcome Evaluation:      Plan of Care Reviewed With: patient    Overall Patient Progress: no changeOverall Patient Progress: no change    Outcome Evaluation: Pt admitted for constipation, s/p 10/11 ex lap, BAUDILIO, SBR, revision of J tube and TAC with ileorectal anastomosis and DLI. Up indep. C/O minor abd pain, declined need for medication. G/J tube both clamped, pt has G tube to gravity when not eating or taking meds. Pt indep in cares. LUQ ileostomy changed by MAKENNA today WOC following M, Fri, but bag fell off last night). Midline incision w/o drainage, staples in place. Pt went to IF to see if drain would be needed, but fluid pocket too small. Possible start off trickle feeds tomorrow, and anticipate discharge to home when med ready. Barriers to discharge include nutrition plan, and antibiotic plan. Of note, pt mentioned to CC issues r/t mental health stress from extended hospital stay. Pt may benefit from palliative consult.

## 2024-10-23 NOTE — PROGRESS NOTES
Colorectal Surgery Progress Note  [facility]  POD#12      Subjective:  No acute events overnight. Slept well, feels improving. Got woken up because colostomy bag came off. No fever/chills. Tolerating FLD for comfort, on TPN. Mild nausea, improving; no vomiting. Voiding independently. Passing gas and stool via colostomy bag.    Vitals:  Vitals:    10/22/24 1942 10/22/24 2246 10/23/24 0452 10/23/24 0727   BP: (!) (P) 86/57 104/55 101/57 100/57   BP Location: (P) Left arm Right arm Left arm Left arm   Patient Position:       Cuff Size:  Adult Regular Adult Regular Adult Regular   Pulse: (P) 102      Resp: (P) 18 17 16 16   Temp: (P) 98.7  F (37.1  C) 98.1  F (36.7  C) 98.5  F (36.9  C) 98.4  F (36.9  C)   TempSrc: (P) Oral Oral Oral Oral   SpO2: (P) 100% 99% 97% 99%   Weight:    60.7 kg (133 lb 12.8 oz)   Height:         I/O:  I/O last 3 completed shifts:  In: 2093.42 [P.O.:60; I.V.:200; NG/GT:125]  Out: 3850 [Urine:2300; Emesis/NG output:100; Stool:1450]    Physical Exam:  Gen: AAOx3, NAD  Pulm: Non-labored breathing  Abd: Soft, non-distended, appropriately tender, no guarding   Incision C/D/I   Stoma pink and viable with stool and gas in bag   G tube in place.               J tube in place, pouched. improved skin erosion around tube site - with ostomy appliance around it with bilious drainage.  Ext:  Warm and well-perfused    BMP  Recent Labs   Lab 10/23/24  0916 10/23/24  0857 10/23/24  0527 10/23/24  0444 10/22/24  2249 10/22/24  0418 10/22/24  0416 10/21/24  2010 10/21/24  1824 10/21/24  1151 10/21/24  0759     --   --   --   --   --  137  --  135  --  133*   POTASSIUM 4.5  --   --   --   --   --  4.3  --  4.4  --  5.4*   CHLORIDE 101  --   --   --   --   --  103  --  101  --  100   CO2 23  --   --   --   --   --  22  --  23  --  23   BUN 22.4*  --   --   --   --   --  19.3  --  16.4  --  26.3*   CR 0.57  --   --   --   --   --  0.58  --  0.67  --  0.63   * 164*  --   142* 151*   < > 168*   < > 142*   < > 187*   MAG 2.3  --  2.7*  --   --   --  2.3  --   --   --  2.5*   PHOS 4.3  --  7.9*  --   --   --  3.8  --   --   --  3.8    < > = values in this interval not displayed.     CBC  Recent Labs   Lab 10/23/24  0527 10/22/24  0416 10/21/24  0621 10/20/24  0537   WBC 18.2* 19.4* 19.1* 16.9*   HGB 9.0* 8.2* 8.6* 8.5*   HCT 28.6* 26.9* 27.0* 26.9*   PLT 1,119* 955* 872* 782*         ASSESSMENT: This is a 59 year old female PMH DM type 1, chronic pancreatitis, gastroparesis, s/p prior pancreatectomy with auto islet transplant in 2016, chronic abdominal pain on buprenorphine, GJ tube, J for feeds and G for decompression and needs to vent 75% of the time, thrice weekly TPN on Sun-Tues-Thurs for slow transit constipation and gastroparesis. Now SP ex lap, BAUDILIO, SBR, revision of J tube and TAC with ileorectal anastomosis and DLI on 10/11.     Today:  - taken for IR yesterday, found no fluid pocket so no drain placed.  - Continue Cycled TPN  - Nothing per J tube for today. If continued improvement, will consider trickle feeds per J tube starting tomorrow.  - Vent G tube    Neuro/Pain: Tylenol, baclofen, flexeril, enzo, oxy, dilaudid   CV: N/A  PULM: encourage IS.  GI/FEN: TPN. FLD for comfort  : Voiding independently  Heme: N/A  ID: Follow WBC. Continue Zosyn/Vanc.  Endocrine: SSI  Other: N/A  Lines: G Tube for decompression. Ok to give meds via G Tube.               J tube to gravity. Nothing via J tube.   Activity: as tolerated.  Ppx: LVNX  Dispo: likely at least 2-3 days pending WBC normalization.  Pending ROBF or at minimum tolerating prior to admission regimen.  Will go home on TPN - currently unclear if back to prior to admission TPN regimen vs a new TPN regimen.      Clinically Significant Risk Factors               # Hypoalbuminemia: Lowest albumin = 3.1 g/dL at 10/14/2024  7:34 AM, will monitor as appropriate                # Moderate Malnutrition: based on nutrition assessment     # Financial/Environmental Concerns: none           Patient seen and discussed with Dr. Esteban, who discussed with Dr. Branch.    Keith Hernandez MD  General Surgery PGY-1  Pager: [phone]

## 2024-10-23 NOTE — PLAN OF CARE
Goal Outcome Evaluation:      Plan of Care Reviewed With: patient    Overall Patient Progress: improvingOverall Patient Progress: improving    POD 12 XL, BAUDILIO, TAC. History of chronic pain from pancreatitis but has been comfortable with current plan. Ambulated x 2 and showered. UAL. Voids without issue. JT clamped and pouched (not currently using). GT clamped with food and meds and tolerating without nausea or increased pain. Not new for patient and she is familiar with cares for this. Ostomy bag changed and cares reviewed with WOC RN. +Flatus and soft stool in bag. BG covered per orders. Cycled TPN and antibiotics as ordered. Lungs clear.

## 2024-10-23 NOTE — PROGRESS NOTES
"[facility] Nurse Inpatient Assessment     Consulted for: Diverting loop ileostomy  10/18: new consult for J tube leakage  10/21 summary:  pain around J tube site likely mostly related to suture wound.  Improvement of seal around the tube with stabilization.  Anticipate that will be able to discontinue pouching within this week  Patient History (according to provider note(s):      \"58 y/o female has a past medical history of Allergic rhinitis, cause unspecified, Allergy to other foods, Arthritis, Choledocholithiasis, Chronic abdominal pain, Chronic constipation, Chronic nausea, Chronic pancreatitis (H), Degeneration of lumbar or lumbosacral intervertebral disc, Esophageal reflux, Gastroparesis, Hiatal hernia, History of pituitary adenoma, Hypothyroidism, Migraines, Mild hyperlipidemia, On tube feeding diet, Pancreatic disease, PONV (postoperative nausea and vomiting), Portacath in place, Type 1 diabetes mellitus without complication (H) (5/9/2022), and Unspecified hearing loss.\"     Assessment:      Tube type and location:  J tube in LUQ    Last photo 10/18/24  History: placed in OR 10/11/24.  10/17/24 CT scan showed J tube in good position   Current Tube Securement: bumper sutured to skin   16 Fr tube with 1 lumens   Leakage around tube: small  Description of leakage/drainage:  bilious  Insertion site assessment:  approximately 0.1 cm gap between the tube and skin  Peritubular skin assessment:  bright red Erythema extending up to 0.3 cm from insertion site  Wound at 6 o'clock where the bumper had been sutured to the skin: 0.5 x 0.3 x 0.1 cm with pink dermal base     Palpation of the wound bed: normal      Wound Drainage: none     Description of drainage: none  ?? Temperature? normal   Pain: mild, tender  Pain interventions prior to dressing change: patient tolerated well  STATUS: improving seal around the tube.  Pain likely related to drainage on the suture " wound  Supplies ordered: gathered       Assessment of new end Ileostomy:  Diagnosis Pertinent to Stoma:  Slow transit constipation       Surgery Date: 10/11/24  Surgeon:[facility]: [facility]  Pouching system in place on assessment today: Leandro two piece, cut to fit, flat, and barrier ring   Pouch barrier status: Minimal melting  Pouch last changed/wear time:,24 hrs- leaked multiple times overnight into 10/23- staff may have been using wet wipes on skin which can affect adhesion so removed them from room.   Reason for pouch change today: routine schedule  Effectiveness of current pouching/ supply plan: Effective  Change made with ostomy management today: No  Pouching system in place after visit today: [place] 2 piece flat cut to fit with barrier ring    Supplies: gathered, at bedside, supplies stored on unit, and discussed with patient     Stoma location: RLQ  Stoma size: 1 1/4 inches  Stoma appearance: red, round, moist, edematous, and protruberant  Mucocutaneous junction:  intact  Peristomal complication(s): none  Output: watery and yellow  Output volume emptied during visit: 75 ml  Abdominal assessment: Firm  Surgical site(s): open to air and staples intact  NG still in place? No  Pain: Sharp  Is patient still on a PCA? No    Ostomy education assessment:  Participant of teaching session today: patient   Education completed today: Pouching system assessment , Pouch change return demonstration, Pouch emptying return demonstration, Importance of hydration, Low fiber diet , Odor/flatus management , and Infection prevention/hygiene   Educational materials/methods: Verbal, Written, FOD [place] education hand out, and Addressed patient/caregiver questions/concerns  Education still needed: Discharge instructions  Learning Comprehension:   Psychosocial assessment: is a retired  and has had to manage TPN, drains at home with home care assist   Patient readiness for education today: attentive  "and active participation  Following visit: patient  is able to demonstrate;         1. How to empty their pouch? Yes and Independent        2. How to change their pouch? Yes and with minimal assist        3. How to read and record intake and output correctly? Yes and Demo provided   Preparation for discharge completed: Placed prescription recommendations in discharge navigator for MD to sign, Ensured patient has extra supplies for discharge, Discussed how to order supplies after discharge , Ordered samples from  after gaining consent from patient/caregiver, Discussed how and when to make an outpatient WO nurse appointment after discharge, Prepared for discharge home with home care, and Discuss signs/symptoms of when to seek medical attention  Preparation for discharge still needed: none  Pt support system on discharge: spouse  WOC recommend home care? Yes  Face to face time: 45 minutes    Treatment Plan:     J tube site:    Pouching system:  2 piece flat 57 mm barrier with urostomy pouch  -cuauhtemoc barrier ring under the barrier.   J tube left open and draining into the urostomy pouch    Change if leaks using the same supplies but cut the barrier to the size of the bumper.  If remains intact, to be changed by WO on MWF    RLQ Ileostomy pouching plan:   Pouching system: ostomy supplies pouches: Leandro 57 FECAL ([id number]) ostomy supplies barrier: Leandro 57mm FLAT ([id number])  Accessories used: [facility] ostomy accessories: 2\" Cera Barrier Ring ([id number])   Frequency of pouch changes: PRN leakage and Three times a week  WOC follow up plan: Twice weekly  Bedside RN interventions: Change pouch PRN if leaking using the supplies above, Empty pouch when 1/3 to 1/2 full, ensure to clean pouch outlet after emptying to prevent odor, Notify WOC for ongoing pouch leakage, Document stoma appearance and output volume, color, and consistency every shift, and Encourage patient to empty pouch with assist     Outpatient " plan:  Request the following supplies:      Leandro    2 piece flat wafer 57mm  # [id number]                           2 piece opaque Fecal pouch without Filter 57mm- # 23537  Accessories  2  barrier ring #[id number]     Adapt powder #9698    No sting film barrier # 6852                           Adapt universal adhesive remover #1910                          Adapt M-9 Spray room deodorizer #7761                           [place] barrier extenders #34733                                 Change your pouch 2 times a week, more often if leaking.    DATA:     Current support surface: Standard  Standard gel mattress (Isoflex)  Containment of urine/stool: Ileostomy pouch  BMI: Body mass index is 20.34 kg/m .   Active diet order: Orders Placed This Encounter      Full Liquid Diet     Output: I/O last 3 completed shifts:  In: 2093.42 [P.O.:60; I.V.:200; NG/GT:125]  Out: 3850 [Urine:2300; Emesis/NG output:100; Stool:1450]     Labs:   Recent Labs   Lab 10/23/24  0527 10/22/24  0416 10/21/24  0621   ALBUMIN  --   --  3.3*   PREALB  --   --  17.0*   HGB 9.0*   < > 8.6*   INR  --   --  1.12   WBC 18.2*   < > 19.1*    < > = values in this interval not displayed.     Pressure injury risk assessment:   Sensory Perception: 4-->no impairment  Moisture: 4-->rarely moist  Activity: 3-->walks occasionally  Mobility: 3-->slightly limited  Nutrition: 3-->adequate  Friction and Shear: 3-->no apparent problem  Martinez Score: 20

## 2024-10-23 NOTE — PROGRESS NOTES
3004-7520 VSS. A&Ox4. Denies pain or nausea. PRN atarax given for anxiety. Ostomy bag and J-tube pouch changed. Pt currently resting, no other complaints at this time. Will continue w/ POC.    129.3

## 2024-10-23 NOTE — PHARMACY-VANCOMYCIN DOSING SERVICE
"Pharmacy Vancomycin Note  Date of Service 2024  Patient's  1965   59 year old, female    Indication: Abscess  Day of Therapy: 4  Current vancomycin regimen:  1000 mg IV q12h  Current vancomycin monitoring method: AUC  Current vancomycin therapeutic monitoring goal: 400-600 mg*h/L    InsightRX Prediction of Current Vancomycin Regimen  Loading dose: N/A  Regimen: 1000 mg IV every 12 hours.  Start time: 22:17 on 10/23/2024  Exposure target: AUC24 (range)400-600 mg/L.hr   AUC24,ss: 358 mg/L.hr  Probability of AUC24 > 400: 21 %  Ctrough,ss: 8.3 mg/L  Probability of Ctrough,ss > 20: 0 %  Probability of nephrotoxicity (Lodise ALEKSANDAR ): 5 %      Current estimated CrCl = Estimated Creatinine Clearance: 103.1 mL/min (based on SCr of 0.57 mg/dL).    Creatinine for last 3 days  10/21/2024:  6:21 AM Creatinine 0.66 mg/dL;  7:59 AM Creatinine 0.63 mg/dL;  6:24 PM Creatinine 0.67 mg/dL  10/22/2024:  4:16 AM Creatinine 0.58 mg/dL  10/23/2024:  9:16 AM Creatinine 0.57 mg/dL    Recent Vancomycin Levels (past 3 days)  10/21/2024:  6:21 AM Vancomycin 8.8 ug/mL  10/23/2024:  9:16 AM Vancomycin 8.2 ug/mL    Vancomycin IV Administrations (past 72 hours)                     vancomycin (VANCOCIN) 1,000 mg in 200 mL dextrose intermittent infusion (mg) 1,000 mg New Bag 10/23/24 1017     1,000 mg New Bag 10/22/24 2059     1,000 mg New Bag  0915     1,000 mg New Bag 10/21/24 2051     1,000 mg New Bag  0927    vancomycin (VANCOCIN) 1,000 mg in 200 mL dextrose intermittent infusion (mg) 1,000 mg New Bag 10/20/24 2110                    Nephrotoxins and other renal medications (From now, onward)      Start     Dose/Rate Route Frequency Ordered Stop    10/22/24 1600  piperacillin-tazobactam (ZOSYN) 3.375 g vial to attach to  mL bag        Note to Pharmacy: For [facility], [facility] and [facility]: For Zosyn-naive patients, use the \"Zosyn initial dose + extended infusion\" order panel.    3.375 g  over 30 Minutes Intravenous EVERY 6 HOURS " "10/22/24 1258      10/21/24 0930  vancomycin (VANCOCIN) 1,000 mg in 200 mL dextrose intermittent infusion        Placed in \"Followed by\" Linked Group    1,000 mg  200 mL/hr over 1 Hours Intravenous EVERY 12 HOURS 10/21/24 0900                 Contrast Orders - past 72 hours (72h ago, onward)      Start     Dose/Rate Route Frequency Stop    10/21/24 1730  iopamidol (ISOVUE-370) solution 82 mL         82 mL Intravenous ONCE 10/21/24 1736    10/21/24 1730  iohexol (OMNIPAQUE) 9 MG/ML oral solution 500 mL         500 mL ORAL OR NJ TUBE ONCE 10/21/24 1737            Interpretation of levels and current regimen:  Vancomycin level is reflective of AUC less than 400    Has serum creatinine changed greater than 50% in last 72 hours: Yes    Urine output:  Good    Renal Function: Stable    InsightRX Prediction of Planned New Vancomycin Regimen  Loading dose: N/A  Regimen: 1250 mg IV every 12 hours.  Start time: 22:17 on 10/23/2024  Exposure target: AUC24 (range)400-600 mg/L.hr   AUC24,ss: 447 mg/L.hr  Probability of AUC24 > 400: 80 %  Ctrough,ss: 10.5 mg/L  Probability of Ctrough,ss > 20: 0 %  Probability of nephrotoxicity (Lodise ALEKSANDAR 2009): 6 %      Plan:  Increase Dose to 1250mg every 12 hours  Vancomycin monitoring method: AUC  Vancomycin therapeutic monitoring goal: 400-600 mg*h/L  Pharmacy will check vancomycin levels as appropriate in 1-3 Days.  Serum creatinine levels will be ordered daily for the first week of therapy and at least twice weekly for subsequent weeks.    Hardy Heredia [facility]    "

## 2024-10-23 NOTE — PROGRESS NOTES
Care Management Follow Up    Length of Stay (days): 12    Expected Discharge Date: 10/25/2024     Concerns to be Addressed: discharge planning     Patient plan of care discussed at interdisciplinary rounds: Yes    Anticipated Discharge Disposition: Home, Home Care, Home Infusion     Anticipated Discharge Services: Home Care  Anticipated Discharge DME: None (Resume: TF, TPN, G + J tube NEW: Ostomy)    Patient/family educated on Medicare website which has current facility and service quality ratings: no  Education Provided on the Discharge Plan:  n/a  Patient/Family in Agreement with the Plan:  n/a    Referrals Placed by CM/SW: Internal Clinic Care Coordination, Homecare  Private pay costs discussed: Not applicable    Discussed  Partnership in Safe Discharge Planning  document with patient/family: No     Handoff Completed: Yes, [facility] PCP: Internal handoff referral completed    Additional Information:  Per H &P: s/p TAC and ileorectal anastomosis, diverting loop ileostomy on 10/11 for slow transit constipation  -Plan is to discharge home with resumption of home care/home infusion  -Patient's  can provide transport at time of discharge  - Primary Care - Care Coordination Referral order placed per automatic BPA    Update 10/23:   Per am rounds:  -Still on IV antibiotics   -Pain and nausea improving  -Plan to start TF tomorrow     RNCC met with patient at the bedside. Dinora was tearful, verbalized feeling tired and lonely, discouraged about extended hospital stay. She endorses knowing that there is hope, but that she's struggling to access her coping skills. RNCC inquired about current OP mental health management. Dinora wishes she could visit with her current therapist through her OP Palliative, but she cannot see her while she's inpatient. RNCC mentioned that IP Palliative team may be able to speak with her - she is open to this. Dinora is looking forward to visiting with her college friend this evening.   RNCC  passed on to Charge RN that patient may benefit from palliative consult for symptom management and mental health support     Update 10/22:   Pelvic drain placement attempted in IR - not enough fluid to place  TPN continues, regular diet for comfort, nothing per J tube, venting G tube   Watery ileostomy output  RNCC updated [facility] liaisons and [facility] on MARJORIE     Update 10/21:   ongoing nausea/emesis, elevated WBC count  CT scan  RNCC updated CARLYN Masterson liaison on MARJORIE 10/23  RNCC updated [facility] that patient is not medically ready. They will continue to follow, appreciated the update.      Current Services confirmed 10/14:  RNCC confirmed with CARLYN Benavides liaison, that patient is open to the following agencies.   Resumption of care orders placed for both home care and home infusion     [facility] ([facility]) (RN cares for G tube, J tube, Ann) (intake RN aware of new ostomy)  Services Available   Home Health Services         Address   [address] [place] [address] [address]  [place] [facility] [address]             Contact Information    [phone]        [facility] (TF and TPN)  [phone]  CARLYN Benavides liaison   [phone]  Teams      Next Steps:   Follow up in care rounds to confirm any changes in discharge needs  Update [facility] and [facility] as needed   Follow up with primary team about placing Palliative consult for symptom management and emotional health  Patient's  can provide transport home     NI Garcia  Care Management Department  Covering 5A: [phone] & 5C: [phone] (non-BMT)   Phone: [phone]  Teams  Vocera: weekdays 8:00 am - 4:30 pm.   See Vocera Care Team for off-day coverage

## 2024-10-23 NOTE — PROGRESS NOTES
Inpatient Diabetes Management Service: Daily Progress Note     HPI: Dinora Mcghee is a 59 year old with pancreatogenic diabetes s/p pancreatectomy and TPAIT in 2016,complicated by gastroparesis, as well as a comorbid history of HTN,HLD, Hypothyroidism, chronic nausea/and constipation, chronic malnutrition and TPN and Tube feeds dependent , who was admitted on 10/11/2024 for exploratory laparotomy, extensive lysis of adhesions, revision of J tube,and small bowel resection. Inpatient Diabetes Service consulted for management of diabetes in the setting of continuous TPN use and expected TF use in future. G and J tube on gravity          Assessment/Plan:     Assessment:   Pancreatogenic diabetes s/p pancreatectomy and TPAIT in 2016,went off insulin for about 4 years and now back on insulin , A1C-5.4% in 7/23/2024  S/p exploratory laparotomy, extensive lysis of adhesions, revision of J tube,and small bowel resection on 10/11/24  Chronic malnutrition, was on TPN X12 hour, 3 days/week and Tube feeds x 12 hours, 7 days/week at home, now tube feeds are off, TPN continuous  Poor oral intake  Continuous TPN related hyperglycemia  Gastroparesis  Abx for postoperative infection/Jtube leakage and cellulitis     Plan/Recommendations:   - Increase Regular insulin 23 --> 24 units with cycled TPN dextrose at 200 g (goal) = 0.12 units/gram dextrose.   - Increase Lantus 3 --> 4 units q 24 hrs for 24 hour at 1600  - Novolog carb coverage: 1 unit per 15 g cho TID AC and 1/20 prn snacks/supplements  - Novolog Correction Scale:   - 1:50 >140 at 08:00, 12:00 pm and 16:00 (when TF are off)  - 1:35 >140 at 20:00, 00:00 and 04:00 (when TF are running)  - BG monitoring: Every 4 hours.  - Hypoglycemia protocol    - Carb counting protocol    Discussion: BG trending higher during the day the past two days at noon despite no PO intake prior, increase Lantus today. BG trended well overnight while cyclic TPN running, still  "required some correction doses, slightly increase regular insulin with TPN for tonight. Patient asking for review of carbohydrate counting, last counted carbohydrates a few years ago, placed dietician consult. Will continue q4hr correction scale until patient eating better, reviewed with patient that correction scale needs to be given a minimum of 2hrs after eating/novolog carb coverage given.       Inpatient Diabetes Management Team Discharge Instructions: (TENTATIVE)    Supplies and insulin to be ordered by Primary team:   Will need lantus pen as was on Levemir before- they will no longer be manufactoring Levemir after 12/2024.  Novolog pen  Yandy 3    Blood Glucose Checks: Three times daily before meals, and at bedtime. Has yandy 3 CGM. Please call if you have more than 2 blood sugar over 250 in one day, or any sugars less than 75.     Glucose Control Regimen:     Lantus insulin: 4 units every day at 4 pm    Regular insulin 24 units in TPN cycled over 12 hours with dextrose 200 g     Novolog insulin to cover carbohydrates with meals: ( \"fixed meal dose\":  2 units with each meal.  Do not give if do not eat meal ) OR If you want to try counting carbs again 1 unit of insulin for 15g of carb.    Novolog insulin correction scale: three times daily before meals and at bedtime (has Yandy)  Give correction insulin based on the following scale: *Give even if skipping a meal* at 8AM, noon, 5PM, bedtime    Do Not give Correction Insulin if BG less than 140  Do not give more frequently then every 4 hours or you will stack insulin and risk going low  Blood Glucose Before Meals, Add   Less than 140 0 units   140 -189  1 units   190 - 239 2 units   240 - 289 3 units   290 - 339 4 units    340 - 389  5 units   390 - 439 6 units   >/= 440 7 units       Endocrinology Outpatient follow up:    Patient should schedule your outpatient diabetes appointment 1-2 weeks from discharge with current provider (Dr. Melissa)     If you have " "urgent questions or concerns regarding your blood sugars or insulin, you may contact [phone] (the main hospital ). Ask to speak with the endocrinologist on call.      Discharge Planning: (tentative)  Medications: TBD  Test Claims: lantus as current levemir is being discontinued  Education:  Unlikely to need, has given insulin before and used correction scale.   Outpatient Follow-up:  recommend  PCP Juan [facility], last seen on 7.22.2024     Please notify Inpatient Diabetes Service if changes are planned to steroids, nutrition, TPN/TF and anticipated procedures requiring prolonged NPO status.         Interval History/Review of Systems :   - The last 24 hours progress and nursing notes reviewed.  - Feeling much better today. Abdominal pain subsiding, has only required pain medication once the past 24hrs which is a huge improvement for her. She is also starting to eat more than she has in \"months\". Would like to review carb counting with dietician.  - Went for CT-guided pelvic drain placement yesterday, no fluid was found so no drain placed     Planned Procedures/Surgeries:  none    Inpatient Glucose Control:       Recent Labs   Lab 10/23/24  0444 10/22/24  2249 10/22/24  2035 10/22/24  1622 10/22/24  1110 10/22/24  0418   * 151* 174* 147* 202* 143*             Medications Impacting Glycemia:   Steroids: None  D5W containing solutions/medications: None  Other medications impacting glucose: NOne         Nutrition:   Orders Placed This Encounter      Full Liquid Diet  Prior to admission: was on TPN on Sunday, Tuesday and Thursday nights.  Also on enteral feeds  Supplements: none   TF: none. Was on 10/15/2024:  Zeo Peptide 1.5 (plain) @ 10 mL/hr via J-tube (only ran PM 10/15- 6AM 10/16).   TPN:  Continuous: dextrose 140 g (10/13) -->  dextrose 170 g (10/14-10/15) --> dextrose increased to 200 g (10/17 - 10/18) --> started cycle over 12 hours with same dextrose 200g at 8 pm at " "10/19/2024... current.        Diabetes History: see full consult note for complete diabetes history   Diabetes Type and Duration: Since 2016, s/p pancreatectomy and TPAI     PTA Medication Regimen: Takes insulin levemir 3 units daily and novolog 1 unit if BG is 175-275, and 2 units if >275 up to every 4 hours as needed. Reports some times she keeps on  giving correction whole night but other times does it once or not require it all.  Historical Diabetes Medications: As above     Glucose monitoring device and frequency: Checks with POW  Outpatient Diabetes Provider: Dr. Melissa (last visit 4/2024)       Physical Exam:   /57 (BP Location: Left arm, Cuff Size: Adult Regular)   Pulse (P) 102   Temp 98.5  F (36.9  C) (Oral)   Resp 16   Ht 1.727 m (5' 8\")   Wt 60.8 kg (134 lb)   SpO2 97%   BMI 20.37 kg/m    General:  well appearing, NAD, sitting up in chair.   Lungs: unlabored breathing on RA.  Mental Status:  Alert and oriented x3  Psych:  calm, cooperative, normal mood and affect          Data:     Lab Results   Component Value Date    A1C 5.4 07/23/2024    A1C 5.6 03/20/2024    A1C 5.4 02/17/2022    A1C 5.2 11/27/2021    A1C 5.5 09/18/2021    A1C 5.4 08/05/2021    A1C 5.7 05/12/2021    A1C 5.9 (A) 04/01/2021    A1C 5.5 12/17/2020    A1C 5.8 (H) 07/30/2020       ROUTINE IP LABS (Last four results)  BMP  Recent Labs   Lab 10/23/24  0444 10/22/24  2249 10/22/24  2035 10/22/24  1622 10/22/24  0418 10/22/24  0416 10/21/24  2010 10/21/24  1824 10/21/24  1151 10/21/24  0759 10/21/24  0748 10/21/24  0621   NA  --   --   --   --   --  137  --  135  --  133*  --  131*   POTASSIUM  --   --   --   --   --  4.3  --  4.4  --  5.4*  --  5.9*   CHLORIDE  --   --   --   --   --  103  --  101  --  100  --  97*   KASSI  --   --   --   --   --  8.6*  --  8.4*  --  8.6*  --  8.4*   CO2  --   --   --   --   --  22  --  23  --  23  --  22   BUN  --   --   --   --   --  19.3  --  16.4  --  26.3*  --  25.5*   CR  --   --   --   " --   --  0.58  --  0.67  --  0.63  --  0.66   * 151* 174* 147*   < > 168*   < > 142*   < > 187*   < > 491*    < > = values in this interval not displayed.     CBC  Recent Labs   Lab 10/22/24  0416 10/21/24  0621 10/20/24  0537 10/19/24  0418   WBC 19.4* 19.1* 16.9* 15.3*   RBC 2.80* 2.77* 2.81* 2.92*   HGB 8.2* 8.6* 8.5* 8.9*   HCT 26.9* 27.0* 26.9* 27.5*   MCV 96 98 96 94   MCH 29.3 31.0 30.2 30.5   MCHC 30.5* 31.9 31.6 32.4   RDW 13.3 13.4 13.3 13.2   * 872* 782* 672*     Inpatient Diabetes Service will continue to follow, please don't hesitate to contact the team with any questions or concerns.     Homa Valencia PA-C  Inpatient Diabetes Service  Pager: [phone]  Available on vocera    Plan discussed with patient, bedside RN, and primary team via this note.    To contact Inpatient Diabetes Service:     7 AM - 5 PM: Page the [employer] following the patient that day (see filed or incomplete progress notes/consult notes under Endocrinology)    OR if uncertain of provider assignment: page job code [phone]    5 PM - 7 AM: First call after hours is to primary service.    For urgent after-hours questions, page job code for on call fellow: [phone]     I spent a total of 35 minutes on the date of the encounter doing prep/post-work, chart review, history and exam, documentation and further activities per the note including lab review, multidisciplinary communication, counseling the patient and/or coordinating care regarding acute hyper/hypoglycemic management, as well as discharge management and planning/communication.

## 2024-10-23 NOTE — PLAN OF CARE
Goal Outcome Evaluation:      Plan of Care Reviewed With: patient    Overall Patient Progress: no change    Outcome Evaluation: No drain placement today. Continue with POC    Afebrile. Intermittent tachycardia but within parameters. OVSS on RA. Denies pain, dizziness, and nausea. Pt reported feeling more fatigued during this shift. Ambulated x1 in the hallway but needed rest periods. PRN ativan given for anxiety x1. PRN sumatriptan given x1 for migraine. Cycled TPN and lipids infusing into CVC. Midline incision WDL. Q4 BS checks corrected with sliding scale. Pt voiding adequately. Full liquid diet and appetite is good. Colostomy with watery greenish-brown output. G- tube clamped for medications currently. J-tube pouch changed due to leaking. Continue with POC.

## 2024-10-23 NOTE — PROGRESS NOTES
"CLINICAL NUTRITION SERVICES - BRIEF NOTE      *See RD note on 10/18 for last nutrition reassessment note    *Received provider order consult- Nutrition Education: \"post-TPIAT 2016, patient requsting review of carbohydrate counting. Previously used carb counting a few years ago, then didn't need. Now eating & has higher insulin needs, plan to discharge on insulin carbohydrate ratio.\"    NEW FINDINGS   Patient reports she was on carbohydrate counting insulin for 1 year after per TPAIT, and then her islet cells started working well enough where she didn't require carb counting insulin anymore.  Pt reports feeling comfortable with food label reading to determine grams of carbohydrate consumed, and she is comfortable with what foods contain carbohydrates.  Pt reports she is eating more this hospital stay than she has in years, but that it's still a modest amount.  Pt on FLD currently.    I asked pt what she would like assistance from RD on today.  Pt reports feeling a little overwhelmed by needing both carb counting insulin and sliding scale insulin, and how to manage both. I advised pt that this question would be better answered by the diabetes team; RD can provide assistance with actual carbohydrate counting.  Pt verbalized understanding. Pt reports she feels comfortable with carb counting and that she will ask more of the logistical questions about her insulin regimen with diabetes team.  Provided pt with handouts Diabetes Label Reading Tips (2020) and Carbohydrate Counting for People with Diabetes (2020) - both from Nutrition Care Manual.    Pt reports a lot has changed over the past several years in regards to her nutrition. Pt reports while it has been a lot to adjust to, she feels hopeful about the positive trajectory of her health.       INTERVENTIONS  Nutrition Education: see above  Collaboration with other providers: per bedside RN, pt is tolerating clamping G-tube after food and meds, but in general is eating " mostly small amounts of food.    Monitoring/Evaluation  Progress toward goals will be monitored and evaluated per protocol.      Magi Estrella RD, , LD  Weekday Units covered: 5A (non-Heme Onc pts) and 7B ([id number])  Available by 5A or 7B Clinical Dietitifernanda Olivo  Weekend Coverage: Weekend Clinical Dietitian Pallavi    Inpatient Clinical Dietitians no longer available via paging

## 2024-10-24 ENCOUNTER — TELEPHONE (OUTPATIENT)
Dept: INTERNAL MEDICINE | Facility: CLINIC | Age: 59
End: 2024-10-24

## 2024-10-24 LAB
ALBUMIN SERPL BCG-MCNC: 3.4 G/DL (ref 3.5–5.2)
ALP SERPL-CCNC: 260 U/L (ref 40–150)
ALT SERPL W P-5'-P-CCNC: 55 U/L (ref 0–50)
ANION GAP SERPL CALCULATED.3IONS-SCNC: 9 MMOL/L (ref 7–15)
AST SERPL W P-5'-P-CCNC: 21 U/L (ref 0–45)
BILIRUB DIRECT SERPL-MCNC: <0.2 MG/DL (ref 0–0.3)
BILIRUB SERPL-MCNC: 0.3 MG/DL
BUN SERPL-MCNC: 18.5 MG/DL (ref 6–20)
CALCIUM SERPL-MCNC: 8.6 MG/DL (ref 8.8–10.4)
CHLORIDE SERPL-SCNC: 103 MMOL/L (ref 98–107)
CREAT SERPL-MCNC: 0.57 MG/DL (ref 0.51–0.95)
EGFRCR SERPLBLD CKD-EPI 2021: >90 ML/MIN/1.73M2
ERYTHROCYTE [DISTWIDTH] IN BLOOD BY AUTOMATED COUNT: 13.4 % (ref 10–15)
GLUCOSE BLDC GLUCOMTR-MCNC: 106 MG/DL (ref 70–99)
GLUCOSE BLDC GLUCOMTR-MCNC: 136 MG/DL (ref 70–99)
GLUCOSE BLDC GLUCOMTR-MCNC: 136 MG/DL (ref 70–99)
GLUCOSE BLDC GLUCOMTR-MCNC: 156 MG/DL (ref 70–99)
GLUCOSE BLDC GLUCOMTR-MCNC: 202 MG/DL (ref 70–99)
GLUCOSE BLDC GLUCOMTR-MCNC: 94 MG/DL (ref 70–99)
GLUCOSE SERPL-MCNC: 101 MG/DL (ref 70–99)
HCO3 SERPL-SCNC: 25 MMOL/L (ref 22–29)
HCT VFR BLD AUTO: 30.6 % (ref 35–47)
HGB BLD-MCNC: 9.5 G/DL (ref 11.7–15.7)
MAGNESIUM SERPL-MCNC: 2.1 MG/DL (ref 1.7–2.3)
MCH RBC QN AUTO: 29.8 PG (ref 26.5–33)
MCHC RBC AUTO-ENTMCNC: 31 G/DL (ref 31.5–36.5)
MCV RBC AUTO: 96 FL (ref 78–100)
PHOSPHATE SERPL-MCNC: 4.2 MG/DL (ref 2.5–4.5)
PLATELET # BLD AUTO: 990 10E3/UL (ref 150–450)
POTASSIUM SERPL-SCNC: 3.8 MMOL/L (ref 3.4–5.3)
PROT SERPL-MCNC: 6.4 G/DL (ref 6.4–8.3)
RBC # BLD AUTO: 3.19 10E6/UL (ref 3.8–5.2)
SODIUM SERPL-SCNC: 137 MMOL/L (ref 135–145)
TRIGL SERPL-MCNC: 68 MG/DL
WBC # BLD AUTO: 12.8 10E3/UL (ref 4–11)

## 2024-10-24 PROCEDURE — 250N000013 HC RX MED GY IP 250 OP 250 PS 637

## 2024-10-24 PROCEDURE — 250N000011 HC RX IP 250 OP 636

## 2024-10-24 PROCEDURE — 85027 COMPLETE CBC AUTOMATED: CPT

## 2024-10-24 PROCEDURE — 999N000197 HC STATISTIC WOC PT EDUCATION, 0-15 MIN

## 2024-10-24 PROCEDURE — 250N000013 HC RX MED GY IP 250 OP 250 PS 637: Performed by: STUDENT IN AN ORGANIZED HEALTH CARE EDUCATION/TRAINING PROGRAM

## 2024-10-24 PROCEDURE — 250N000011 HC RX IP 250 OP 636: Performed by: PHYSICIAN ASSISTANT

## 2024-10-24 PROCEDURE — 84100 ASSAY OF PHOSPHORUS: CPT | Performed by: COLON & RECTAL SURGERY

## 2024-10-24 PROCEDURE — 250N000013 HC RX MED GY IP 250 OP 250 PS 637: Performed by: COLON & RECTAL SURGERY

## 2024-10-24 PROCEDURE — 36592 COLLECT BLOOD FROM PICC: CPT | Performed by: PHYSICIAN ASSISTANT

## 2024-10-24 PROCEDURE — 83735 ASSAY OF MAGNESIUM: CPT | Performed by: COLON & RECTAL SURGERY

## 2024-10-24 PROCEDURE — 258N000003 HC RX IP 258 OP 636: Performed by: SURGERY

## 2024-10-24 PROCEDURE — 250N000013 HC RX MED GY IP 250 OP 250 PS 637: Performed by: SURGERY

## 2024-10-24 PROCEDURE — 250N000009 HC RX 250: Performed by: STUDENT IN AN ORGANIZED HEALTH CARE EDUCATION/TRAINING PROGRAM

## 2024-10-24 PROCEDURE — 250N000009 HC RX 250

## 2024-10-24 PROCEDURE — 84478 ASSAY OF TRIGLYCERIDES: CPT | Performed by: PHYSICIAN ASSISTANT

## 2024-10-24 PROCEDURE — 250N000011 HC RX IP 250 OP 636: Performed by: SURGERY

## 2024-10-24 PROCEDURE — 80053 COMPREHEN METABOLIC PANEL: CPT

## 2024-10-24 PROCEDURE — 82248 BILIRUBIN DIRECT: CPT | Performed by: PHYSICIAN ASSISTANT

## 2024-10-24 PROCEDURE — 250N000011 HC RX IP 250 OP 636: Performed by: COLON & RECTAL SURGERY

## 2024-10-24 PROCEDURE — B4185 PARENTERAL SOL 10 GM LIPIDS: HCPCS | Performed by: COLON & RECTAL SURGERY

## 2024-10-24 PROCEDURE — 250N000009 HC RX 250: Performed by: COLON & RECTAL SURGERY

## 2024-10-24 PROCEDURE — 120N000002 HC R&B MED SURG/OB UMMC

## 2024-10-24 PROCEDURE — 258N000003 HC RX IP 258 OP 636: Performed by: COLON & RECTAL SURGERY

## 2024-10-24 PROCEDURE — 99233 SBSQ HOSP IP/OBS HIGH 50: CPT | Mod: 24 | Performed by: NURSE PRACTITIONER

## 2024-10-24 RX ORDER — TRAZODONE HYDROCHLORIDE 50 MG/1
50 TABLET, FILM COATED ORAL AT BEDTIME
Qty: 7 TABLET | Refills: 0 | Status: SHIPPED | OUTPATIENT
Start: 2024-10-24 | End: 2024-11-04

## 2024-10-24 RX ORDER — ACETAMINOPHEN 325 MG/10.15ML
975 LIQUID ORAL 4 TIMES DAILY
Status: DISCONTINUED | OUTPATIENT
Start: 2024-10-24 | End: 2024-10-28 | Stop reason: HOSPADM

## 2024-10-24 RX ORDER — CYCLOBENZAPRINE HCL 10 MG
5-10 TABLET ORAL EVERY 8 HOURS PRN
Qty: 10 TABLET | Refills: 0 | Status: SHIPPED | OUTPATIENT
Start: 2024-10-24 | End: 2024-10-25

## 2024-10-24 RX ORDER — OXYCODONE HCL 5 MG/5 ML
5-10 SOLUTION, ORAL ORAL EVERY 6 HOURS PRN
Qty: 150 ML | Refills: 0 | Status: SHIPPED | OUTPATIENT
Start: 2024-10-25 | End: 2024-10-31

## 2024-10-24 RX ADMIN — PIPERACILLIN AND TAZOBACTAM 3.38 G: 3; .375 INJECTION, POWDER, LYOPHILIZED, FOR SOLUTION INTRAVENOUS at 10:12

## 2024-10-24 RX ADMIN — Medication 50 MG: at 23:32

## 2024-10-24 RX ADMIN — PANCRELIPASE 4 CAPSULE: 120000; 24000; 76000 CAPSULE, DELAYED RELEASE PELLETS ORAL at 17:59

## 2024-10-24 RX ADMIN — HYDROXYZINE HYDROCHLORIDE 50 MG: 25 TABLET, FILM COATED ORAL at 07:28

## 2024-10-24 RX ADMIN — PIPERACILLIN AND TAZOBACTAM 3.38 G: 3; .375 INJECTION, POWDER, LYOPHILIZED, FOR SOLUTION INTRAVENOUS at 05:28

## 2024-10-24 RX ADMIN — VANCOMYCIN HYDROCHLORIDE 1250 MG: 10 INJECTION, POWDER, LYOPHILIZED, FOR SOLUTION INTRAVENOUS at 11:36

## 2024-10-24 RX ADMIN — DOXAZOSIN 1 MG: 4 TABLET ORAL at 07:28

## 2024-10-24 RX ADMIN — HYDROXYZINE HYDROCHLORIDE 25 MG: 25 TABLET, FILM COATED ORAL at 19:27

## 2024-10-24 RX ADMIN — BACLOFEN 10 MG: 10 TABLET ORAL at 15:28

## 2024-10-24 RX ADMIN — BACLOFEN 10 MG: 10 TABLET ORAL at 07:28

## 2024-10-24 RX ADMIN — PIPERACILLIN AND TAZOBACTAM 3.38 G: 3; .375 INJECTION, POWDER, LYOPHILIZED, FOR SOLUTION INTRAVENOUS at 16:56

## 2024-10-24 RX ADMIN — TRAZODONE HYDROCHLORIDE 50 MG: 50 TABLET ORAL at 23:32

## 2024-10-24 RX ADMIN — PIPERACILLIN AND TAZOBACTAM 3.38 G: 3; .375 INJECTION, POWDER, LYOPHILIZED, FOR SOLUTION INTRAVENOUS at 23:26

## 2024-10-24 RX ADMIN — OXYCODONE HYDROCHLORIDE 5 MG: 5 SOLUTION ORAL at 00:36

## 2024-10-24 RX ADMIN — OXYCODONE HYDROCHLORIDE 5 MG: 5 SOLUTION ORAL at 23:31

## 2024-10-24 RX ADMIN — Medication 60 MG: at 20:28

## 2024-10-24 RX ADMIN — CYCLOBENZAPRINE HYDROCHLORIDE 10 MG: 5 TABLET, FILM COATED ORAL at 20:28

## 2024-10-24 RX ADMIN — LIDOCAINE: 40 CREAM TOPICAL at 23:00

## 2024-10-24 RX ADMIN — ACETAMINOPHEN 975 MG: 325 SOLUTION ORAL at 18:02

## 2024-10-24 RX ADMIN — ACETAMINOPHEN 975 MG: 325 SOLUTION ORAL at 23:31

## 2024-10-24 RX ADMIN — PANCRELIPASE 3 CAPSULE: 120000; 24000; 76000 CAPSULE, DELAYED RELEASE PELLETS ORAL at 10:01

## 2024-10-24 RX ADMIN — LEVOTHYROXINE SODIUM 150 MCG: 75 TABLET ORAL at 07:28

## 2024-10-24 RX ADMIN — LORAZEPAM 1 MG: 1 TABLET ORAL at 19:15

## 2024-10-24 RX ADMIN — PROMETHAZINE HYDROCHLORIDE 25 MG: 25 INJECTION, SOLUTION INTRAMUSCULAR; INTRAVENOUS at 07:28

## 2024-10-24 RX ADMIN — SMOFLIPID 250 ML: 6; 6; 5; 3 INJECTION, EMULSION INTRAVENOUS at 20:40

## 2024-10-24 RX ADMIN — PROMETHAZINE HYDROCHLORIDE 25 MG: 25 INJECTION, SOLUTION INTRAMUSCULAR; INTRAVENOUS at 20:48

## 2024-10-24 RX ADMIN — OXYCODONE HYDROCHLORIDE 5 MG: 5 SOLUTION ORAL at 20:28

## 2024-10-24 RX ADMIN — ACETAMINOPHEN 1000 MG: 325 SOLUTION ORAL at 05:38

## 2024-10-24 RX ADMIN — VANCOMYCIN HYDROCHLORIDE 1250 MG: 10 INJECTION, POWDER, LYOPHILIZED, FOR SOLUTION INTRAVENOUS at 00:14

## 2024-10-24 RX ADMIN — BACLOFEN 10 MG: 10 TABLET ORAL at 20:28

## 2024-10-24 RX ADMIN — ACETAMINOPHEN 975 MG: 325 SOLUTION ORAL at 12:22

## 2024-10-24 RX ADMIN — FAMOTIDINE 20 MG: 40 POWDER, FOR SUSPENSION ORAL at 20:28

## 2024-10-24 RX ADMIN — FAMOTIDINE 20 MG: 40 POWDER, FOR SUSPENSION ORAL at 07:28

## 2024-10-24 RX ADMIN — CYCLOBENZAPRINE HYDROCHLORIDE 10 MG: 5 TABLET, FILM COATED ORAL at 00:36

## 2024-10-24 RX ADMIN — PROMETHAZINE HYDROCHLORIDE 25 MG: 25 INJECTION, SOLUTION INTRAMUSCULAR; INTRAVENOUS at 15:29

## 2024-10-24 RX ADMIN — OXYCODONE HYDROCHLORIDE 5 MG: 5 SOLUTION ORAL at 13:52

## 2024-10-24 RX ADMIN — LIDOCAINE 4% 1 PATCH: 40 PATCH TOPICAL at 05:33

## 2024-10-24 RX ADMIN — HYDROXYZINE HYDROCHLORIDE 25 MG: 25 TABLET, FILM COATED ORAL at 19:15

## 2024-10-24 RX ADMIN — ENOXAPARIN SODIUM 40 MG: 40 INJECTION SUBCUTANEOUS at 15:28

## 2024-10-24 RX ADMIN — MAGNESIUM SULFATE HEPTAHYDRATE: 500 INJECTION, SOLUTION INTRAMUSCULAR; INTRAVENOUS at 20:39

## 2024-10-24 NOTE — DISCHARGE SUMMARY
[facility]  Discharge Summary  Colon and Rectal Surgery     Dinora Mcghee MRN# [id number]   YOB: 1965 Age: 59 year old     Date of Admission:  10/11/2024  Date of Discharge::  10/28/2024  Admitting Physician:  Kaylene Branch MD  Discharge Physician:  Kaylene Branch MD  Primary Care Physician:        Juan Jose Gomez          Admission Diagnoses:   Slow transit constipation [K59.01]  Type 1 diabetes  Chronic pancreatitis  S/p prior total pancreatectomy with auto islet transplantation in 2016  GERD  Gastroparesis  History of pituitary adenoma s/p resection  Hypothyroidism  G-J tube (g-tube for venting, J-tube for tube feeds)  Tube dependent  On TPN Tues, Thurs, Sunday  Receives outpatient IV fluids  Nausea, emesis  Anxiety   Depression   Migraines  On chronic opiates and follows with [facility]          Discharge Diagnosis:   Slow transit constipation [K59.01]  Type 1 diabetes  Chronic pancreatitis  S/p prior total pancreatectomy with auto islet transplantation in 2016  GERD  Gastroparesis  History of pituitary adenoma s/p resection  Hypothyroidism  G-J tube (g-tube for venting, J-tube for tube feeds)  Tube dependent  On TPN Tues, Thurs, Sunday  Receives outpatient IV fluids  Nausea, emesis  Anxiety   Depression   Migraines  On chronic opiates and follows with [facility]  Hypomagnesemia  Hypophosphatemia  Hyponatremia  Thrombocytosis  Acute blood loss anemia           Procedures:   10/11/2024:   Procedure:      Exploratory laparotomy, extensive lysis of adhesions, revision of Jtube and small bowel resection, TOTAL ABDOMINAL COLECTOMY WITH ILEORECTAL ANASTAMOSIS, DIVERTING LOOP ILEOSTOMY, flexible sigmoidoscopy           Consultations:   PHARMACY/NUTRITION TO START AND MANAGE TPN  WOUND OSTOMY CONTINENCE NURSE  IP CONSULT  ENDOCRINE DIABETES ADULT IP CONSULT  PHARMACY IP CONSULT  NURSING TO CONSULT FOR  VASCULAR ACCESS CARE IP CONSULT  NURSING TO CONSULT FOR VASCULAR ACCESS CARE IP CONSULT  CARE MANAGEMENT / SOCIAL WORK IP CONSULT  NURSING TO CONSULT FOR VASCULAR ACCESS CARE IP CONSULT  NUTRITION SERVICES ADULT IP CONSULT  PHYSICAL THERAPY ADULT IP CONSULT  CARE MANAGEMENT / SOCIAL WORK IP CONSULT  PHARMACY IP CONSULT  PHYSICAL THERAPY ADULT IP CONSULT  PHARMACY IP CONSULT  WOUND OSTOMY CONTINENCE NURSE  IP CONSULT  PHARMACY IP CONSULT  PHARMACY IP CONSULT  PHARMACY TO DOSE VANCO  PHARMACY IP CONSULT  PHARMACY LIAISON FOR MEDICATION COVERAGE CONSULT  INTERVENTIONAL RADIOLOGY ADULT/PEDS IP CONSULT  PHARMACY IP CONSULT  PHARMACY IP CONSULT  NUTRITION SERVICES ADULT IP CONSULT  PHARMACY IP CONSULT  NUTRITION SERVICES ADULT IP CONSULT  PHARMACY IP CONSULT         Imaging Studies:     Results for orders placed or performed during the hospital encounter of 10/11/24   POC US Guidance Needle Placement    Narrative    Bilateral transversus abdominal plane block       Impression    Bilateral transversus abdominal plane block      CT Abdomen Pelvis w Contrast    Narrative    EXAMINATION: CT ABDOMEN PELVIS W CONTRAST, 10/17/2024 11:18 AM    INDICATION: Evaluate J tube position. S/p colectomy with diverting  loop ileostomy and ileorectal anastamosis on 10/11/2024.    COMPARISON STUDY: CT AP 9/4/2024.    TECHNIQUE: CT scan of the abdomen and pelvis was performed on  multidetector CT scanner using volumetric acquisition technique and  images were reconstructed in multiple planes with variable thickness  and reviewed on dedicated workstations.     CONTRAST: 82 mL Isovue-370 injected IV with oral contrast    CT scan radiation dose is optimized to minimum requisite dose using  automated dose modulation techniques.    FINDINGS:  Lower thorax: Dependent bibasilar atelectasis. Multifocal atelectasis  in the left lower lobe. Small calcified granuloma in left lower lobe.  Trace left pleural effusion. Normal heart size. No  pericardial  effusion. Distal esophagus appears unremarkable.    Liver: No mass. No intrahepatic biliary ductal dilation.    Biliary System: Cholecystectomy. No extrahepatic biliary ductal  dilation.    Pancreas: Pancreatectomy.    Adrenal glands: No mass or nodules.    Spleen: Splenectomy.    Kidneys: No suspicious mass, obstructing calculus or hydronephrosis.  Focal cortical hypodensities, too small to characterize and  statistically favored to represent simple cysts.    Gastrointestinal tract: Percutaneous gastrostomy tube is in the  gastric antrum. Percutaneous jejunostomy tube tip is in a loop of  small bowel in the left lower quadrant. Postoperative changes of small  bowel resection. New postoperative changes of colectomy with diverting  loop ileostomy in the right lower quadrant and ileorectal anastomosis.  Dilated small bowel loops in the left upper quadrant measuring up to  3.7 cm, with a transition point in the left lower quadrant at the  anastomotic site (series 4 images [id number]). There is a small amount of  air and fluid in small bowel loops distal to the transition point.  Enteric contrast is visualized within small bowel loops in the right  midabdomen and lower abdomen. Appendectomy.    Mesentery/peritoneum/retroperitoneum: No mass. Scattered foci of  intra-abdominal free air, most prominent anterior to the liver.  Moderate volume ascites in the lower abdomen and pelvis.  Mild diffuse  peritoneal thickening.    Lymph nodes: No significant lymphadenopathy.    Vasculature: Patent major abdominal vasculature. Portal veins are  patent.    Pelvis: Urinary bladder shows mild concentric wall thickening. Foci of  air along the anterior bladder wall, presumably secondary to  catheterization. Hysterectomy.    Osseous structures: No aggressive or acute osseous lesion. L2  vertebral body hemangioma.      Soft tissues: Vertically oriented surgical staples along the mid and  inferior anterior abdominal wall.       Impression    IMPRESSION:   1. Dilated left upper quadrant small bowel loops with a transition  point in the left lower quadrant at anastomotic site and a small  amount of air and fluid in small bowel loops distally, concerning for  developing small bowel obstruction.  2. Percutaneous jejunostomy tube tip terminates in a loop of small  bowel in the left lower quadrant. Percutaneous gastrostomy tube  terminates in the gastric antrum.  3. Moderate volume ascites in the lower abdomen and pelvis and  scattered postoperative pneumoperitoneum.  4. Postoperative changes of colectomy with right lower quadrant  diverting loop ileostomy and ileorectal anastomosis.    I have personally reviewed the examination and initial interpretation  and I agree with the findings.    SYDNEY GARCIA MD         SYSTEM ID:  [id number]   CT Abdomen Pelvis w Contrast    Narrative    EXAMINATION: CT ABDOMEN PELVIS W CONTRAST, 10/21/2024 5:49 PM    INDICATION: G-tube & J-tube. s/p ex-lap, LAR, sb resection x3.    COMPARISON STUDY: 10/17/2024    TECHNIQUE: CT scan of the abdomen and pelvis was performed on  multidetector CT scanner using volumetric acquisition technique and  images were reconstructed in multiple planes with variable thickness  and reviewed on dedicated workstations.     CONTRAST: iopamidol (ISOVUE-370) solution 82 mL; iohexol (OMNIPAQUE) 9  MG/ML oral solution 60 mL injected IV without oral contrast    CT scan radiation dose is optimized to minimum requisite dose using  automated dose modulation techniques.    FINDINGS:    Lower thorax: Normal.    Liver: No mass. No intrahepatic biliary ductal dilation.  Pneumobilia.    Biliary System: Gallbladder surgically absent.    Pancreas: The pancreas is surgically absent.    Adrenal glands: No mass or nodules    Spleen: The spleen is surgically absent    Kidneys: No suspicious mass, obstructing calculus or hydronephrosis.     Gastrointestinal tract :Increased dilation of the small bowel in  the  left hemiabdomen, without definite transition point.  G-tube place.  J-tube in place. Right lower quadrant end ileostomy. Rectal stump  suture appears intact.    Mesentery/peritoneum/retroperitoneum: No mass. Trace postsurgical  pneumoperitoneum. Improving amount of fluid in the pelvis with  suspected debris and peripheral enhancement, measuring 3.9 x 5.4 x 4.6  cm (4/230, 6/56).    Lymph nodes: No significant lymphadenopathy.    Vasculature: Patent major abdominal vasculature.    Pelvis: Air in the urinary bladder, likely secondary to  instrumentation. Uterus is surgically absent, no adnexal mass    Osseous structures: No aggressive or acute osseous lesion.      Soft tissues: Within normal limits.      Impression    IMPRESSION:   1. Increased small bowel dilatation in the left hemiabdomen without  definite transition point is likely due to postsurgical ileus but  partial or developing small bowel obstruction is possible. Correlate  with abdominal examination and symptoms of small bowel obstruction.  2. The amount of pelvic fluid is decreased but there is increased  peripheral enhancement and layering debris. Although favored  postsurgical hematoma, developing abscess cannot be fully ruled out at  this point.    I have personally reviewed the examination and initial interpretation  and I agree with the findings.    CARL MAR MD         SYSTEM ID:  [id number]   CT Abdomen Peritoneum Abscess Drain w Cath Place    Narrative    PROCEDURE: Drainage catheter attempted placement.    Procedural Personnel  Attending physician(s): Stanley Waller  Resident physician(s): Jesus Barnett    Pre-procedure diagnosis: Lower pelvic collection  Post-procedure diagnosis: No adequate pelvic collection for drain  placement.  Indication: Post-operative fluid collection    Complications: No immediate complications.      Impression    IMPRESSION:   - No adequate collection for placement of a drain.    Plan:   - 2 hour bedrest  -  Remaining cares for primary team.  ____________________________________________________________________    PROCEDURE SUMMARY:  - Ultrasound and CT of the lower pelvis did not demonstrate adequate  collection for drain placement.    PROCEDURE DETAILS:    Pre-procedure  Consent: Informed consent for the procedure including risks, benefits  and alternatives was obtained and time-out was performed prior to the  procedure.  Preparation: The site was prepared and draped using maximal sterile  barrier technique including cutaneous antisepsis.    Anesthesia/sedation  Level of anesthesia/sedation: Moderate sedation (conscious sedation)  Anesthesia/sedation administered by: Independent trained observer  under attending supervision with continuous monitoring of the  patient?s level of consciousness and physiologic status  Total intra-service sedation time (minutes): 18    Drainage catheter placement  The patient was positioned supine. Initial imaging was performed and  demonstrated fluid-filled bladder with a trace collection to the right  anterior side. Local anesthesia was administered. A Yueh catheter was  attempted to be advanced into pelvis and subsequently retracted due to  the lack of adequate fluid collection. A subsequent CT scan  demonstrated the previously identified collection in lower pelvis seen  on CT from 10/21/2024 was no longer present.     Contrast  Contrast agent: None    Radiation Dose  CT dose length product (mGy-cm): 408     Additional Details  Additional description of procedure: None  Specimens removed: None  Estimated blood loss (mL): Less than 10  Standardized report: [email or web address]_v3.1    Attestation  Signer name: CARLITOS BAXTER MD  I attest that I was present for the entire procedure. I reviewed the  stored images and agree with the report as written.        I have personally reviewed the examination and initial interpretation  and I agree with the findings.    CARLITOS BAXTER MD          SYSTEM ID:  [id number]     *Note: Due to a large number of results and/or encounters for the requested time period, some results have not been displayed. A complete set of results can be found in Results Review.              Medications Prior to Admission:     Facility-Administered Medications Prior to Admission   Medication Dose Route Frequency Provider Last Rate Last Admin    Botulinum Toxin Type A (BOTOX) 200 units injection 155 Units  155 Units Intramuscular See Admin Instructions    155 Units at 08/15/24 1731    Botulinum Toxin Type A (BOTOX) 200 units injection 155 Units  155 Units Intramuscular See Admin Instructions Rachelle العلي APRN CNP   155 Units at 05/23/24 1703     Medications Prior to Admission   Medication Sig Dispense Refill Last Dose/Taking    acetaminophen (TYLENOL) 325 MG/10.15ML solution 20.3 mLs (650 mg) by Per J Tube route every 6 hours as needed for mild pain or fever 473 mL 4 10/11/2024    baclofen (LIORESAL) 10 MG tablet 1-2 tablets (10-20 mg) by Per G Tube route 3 times daily 60 tablet 1 10/11/2024    buprenorphine (SUBUTEX) 2 MG SUBL sublingual tablet Place 2 mg under the tongue 2 times daily   10/11/2024    cetirizine (ZYRTEC) 10 MG tablet 1 tablet (10 mg) by Per G Tube route 2 times daily (Patient taking differently: Place 10 mg into G tube every morning.) 60 tablet 11 Past Week    COMPOUNDED NON-CONTROLLED SUBSTANCE (CMPD RX) - PHARMACY TO MIX COMPOUNDED MEDICATION Administer 40 mg amitriptyline suspension (2 mg/mL) via G-J tube at bedtime. 600 mL 5 Past Week    diphenhydrAMINE (BENADRYL) 12.5 MG/5ML solution Take 25 mg by mouth 4 times daily as needed for other (nausea)   Past Week    DULoxetine HCl 60 MG CSDR 1 capsule (60 mg) by Per J Tube route daily Sprinkle caps for feeding tube (Patient taking differently: Place 60 mg into J tube every evening. Sprinkle caps for feeding tube) 90 capsule 3 10/10/2024    estradiol (VAGIFEM) 10 MCG TABS vaginal tablet INSERT 1  "TABLET(10 MCG) VAGINALLY 2 TIMES A WEEK 24 tablet 2 Past Month    famotidine (PEPCID) 40 MG/5ML suspension 2.5 mLs (20 mg) by Per J Tube route daily (Patient taking differently: Place 20 mg into J tube every evening.) 50 mL 4 10/11/2024    ibuprofen (ADVIL/MOTRIN) 400 MG tablet take 30 mls  by oral route  every 4 - 6 hours as needed   Unknown    insulin aspart (NOVOLOG FLEXPEN) 100 UNIT/ML pen Take 1 unit if BG is 175-275, and 2 units if >275 up to every 4 hours as needed.  \"Prime\" pen needle with 2 unit airshot before giving, needs supply for 15 unit/day. 15 mL 11 10/10/2024    insulin aspart (NOVOLOG PEN) 100 UNIT/ML pen Take 1 unit if BG is 175-275, and 2 units if >275 up to every 4 hours as needed.  \"Prime\" pen needle with 2 unit airshot before giving, needs supply for 15 unit/day. 15 mL 11 10/10/2024    insulin detemir (LEVEMIR PEN) 100 UNIT/ML pen Take 2 units daily; use 2 unit priming; approximate daily maximum use 6 units (Patient taking differently: Inject 3 Units subcutaneously at bedtime. Takes when starting TPN infusion)   Unknown    lactated ringers infusion Inject 1,000 mLs into the vein daily as needed   10/10/2024    levothyroxine (SYNTHROID) 25 mcg/mL SUSP Place 6 mLs (150 mcg) into J tube daily. (Patient taking differently: Place 150 mcg into J tube every morning. Triosint) 540 mL 3 Unknown    lidocaine (LIDODERM) 5 % patch Place onto the skin as needed. To prevent lidocaine toxicity, patient should be patch free for 12 hrs daily.   Past Week    lipase-protease-amylase (CREON) 93381-05153-817574 units CPEP per EC capsule Take 3-4 capsules by mouth 3 times daily (with meals) With oral meals 150 capsule 11 Past Week    meclizine (ANTIVERT) 25 MG tablet Take 25 mg by mouth 3 times daily as needed.   Past Month    methocarbamol (ROBAXIN) 750 MG tablet Take 1 tablet (750 mg) by mouth 4 times daily as needed for muscle spasms 120 tablet 11 10/10/2024    montelukast (SINGULAIR) 10 MG tablet Take 10 mg by " mouth at bedtime.   Past Week    pantoprazole (PROTONIX) 40 mg IV push injection Inject 40 mg into the vein daily (with breakfast) (Patient taking differently: Inject 40 mg into the vein every morning.) 30 each 11 10/11/2024    parenteral nutrition - PTA/DISCHARGE ORDER The TPN formula will print on the After Visit Summary Report. 1 each 0 10/11/2024    prochlorperazine (COMPAZINE) 10 MG tablet Take 1 tablet (10 mg) by mouth every 6 hours as needed for nausea or vomiting 120 tablet 3 Past Month    promethazine 25 mg Inject 25 mg into the vein 3 times daily   10/10/2024    rimegepant (NURTEC) 75 MG ODT tablet Place 1 tablet (75 mg) under the tongue daily as needed for migraine Maximum of 1 tablet every 24 hours. 8 tablet 11 Past Month    scopolamine (TRANSDERM) 1 MG/3DAYS 72 hr patch Place 1 patch onto the skin every 72 hours - Transdermal 10 patch 11 10/10/2024    SYNDROS 5 MG/ML oral soln TAKE 0.5 ML BY MOUTH 2 TIMES DAILY 60 mL 0 10/10/2024    ZOLMitriptan (ZOMIG-ZMT) 5 MG ODT Take 1 tablet (5 mg) by mouth at onset of headache for migraine Dissolve tablet in the mouth. May repeat in 2 hours. Max 2 tablets/24 hours. 12 tablet 6 Past Month    COMPOUNDED NON-CONTROLLED SUBSTANCE (CMPD RX) - PHARMACY TO MIX COMPOUNDED MEDICATION Administer 60 mg amitriptyline 2 mg/ml via G-J tube at bedtime (Patient taking differently: Administer 40 mg amitriptyline 2 mg/ml via G-J tube at bedtime) 900 mL 3     Continuous Blood Gluc Sensor (FREESTYLE LISA 3 SENSOR) MISC CHANGE EVERY 14 DAYS 2 each 11     Digestive Enzyme Cartridge (RELIZORB) MARCO 2 Cartridges daily (Patient taking differently: 3 Cartridges daily.) 60 each 6     glucagon 1 MG kit Give 0.1 to 0.15mg( 10-15 units on Insulin sryinge) subcutaneous  every 15 minutes PRN for hypoglycemia. Remix new kit q24hr. Needs up to 3 kit/week. 10 each 3     glucose 40 % GEL gel Take 15-30 g by mouth every 15 minutes as needed for low blood sugar 3 Tube 2     insulin syringe-needle  "U-100 (30G X 1/2\" 0.3 ML) 30G X 1/2\" 0.3 ML miscellaneous Use 3 syringes daily or as directed. 100 each 11     Nutritional Supplements (BOOST HIGH PROTEIN) LIQD After above baseline labs are drawn, give: 6 mL/kg to maximum of 360 mL; the beverage is to be consumed within 5 minutes.  0     Sharps Container (BD SHARPS ) MISC 1 Container as needed 1 each 1     silver nitrate (ARZOL SILVER NIT APPLICATORS) 75-25 % miscellaneous Apply topically as needed for wound care Apply Silver Nitrate Sticks every 2-3 days until granulation tissue resolved.  \"Roll\" tip of Silver Nitrate Q tip directly onto the granulation tissue-bulging tissue area.  Have Agency or Home Care Nurse administer this, if you prefer.  Take care not to touch any healthy tissue or skin.  The tissue will turn white and eventually black & scab off.  May repeat if needed in the future for recurrence. 30 applicator 0     STATIN NOT PRESCRIBED (INTENTIONAL) Previous liver issues, risks vs benefits felt to not warrant statin.    Discussed Oct 2022 visit       zinc oxide - white petrolatum (CRITIC-AID THICK MOIST BARRIER) 20-51% PSTE topical paste Apply 71 g topically every hour as needed for rash (Under J tube bumper when needed for skin protection) 170 g 0     [DISCONTINUED] COMPOUNDED NON-CONTROLLED SUBSTANCE (CMPD RX) - PHARMACY TO MIX COMPOUNDED MEDICATION Place 1 enema rectally 2 times daily as needed (for constipation) 60 each 3 10/10/2024    [DISCONTINUED] IBSRELA 50 MG TABS Place 50 mg into G tube 2 times daily.   10/11/2024    [DISCONTINUED] MOVANTIK 25 MG TABS tablet Take 25 mg by mouth every morning (before breakfast).   10/11/2024    [DISCONTINUED] neomycin (MYCIFRADIN) 500 MG tablet Take 2 tablets (1,000 mg) by mouth every 6 hours. At 8:00 am, 2:00 pm, 8:00 pm the day prior to your surgery with flagyl and zofran. 6 tablet 0 10/10/2024    [DISCONTINUED] polyethylene glycol (MIRALAX) 17 GM/Dose powder Take 1 Capful by mouth 2 times daily.   " 10/11/2024    [DISCONTINUED] prochlorperazine (COMPRO) 25 MG suppository Place 1 suppository (25 mg) rectally every 12 hours as needed for nausea 10 suppository 2 10/10/2024    [DISCONTINUED] sennosides (SENOKOT) 8.8 MG/5ML syrup 5 mLs by Per J Tube route 2 times daily 236 mL 11 Past Week    [DISCONTINUED] sodium phosphate, mineral oil, magnesium citrate enema Instill 1 pink 187 ml enema twice daily as needed for constipation                 Discharge Medications:     Current Discharge Medication List        START taking these medications    Details   !! Continuous Glucose Sensor (FREESTYLE LISA 3 SENSOR) MISC Change every 14 days.  Qty: 6 each, Refills: 1    Associated Diagnoses: S/P pancreatic islet cell transplantation (H); Type 1 diabetes mellitus without complication (H)      cyclobenzaprine (FLEXERIL) 10 MG tablet Take 0.5-1 tablets (5-10 mg) by mouth every 8 hours as needed for muscle spasms.  Qty: 20 tablet, Refills: 0    Associated Diagnoses: Acute post-operative pain      hydrOXYzine HCl (ATARAX) 25 MG tablet Take 1 tablet (25 mg) by mouth every 6 hours as needed for other or anxiety (adjuvant pain).  Qty: 20 tablet, Refills: 0    Associated Diagnoses: Acute post-operative pain      !! insulin aspart (NOVOLOG PEN) 100 UNIT/ML pen Inject 1-12 Units subcutaneously 3 times daily (with meals). Correction Medium Scale.  Do Not give Insulin if BG less than 140.  For Pre-Meal  - 189 give 1 unit.  - 239 give 2 U.   - 289 give 3 U.   - 339 give 4 U.  - 389 give 5 U.  -439 give 6 U.  BG greater than or equal to 440 give 7 units.  Qty: 15 mL, Refills: 0    Associated Diagnoses: S/P pancreatic islet cell transplantation (H); Type 1 diabetes mellitus without complication (H)      !! insulin aspart (NOVOLOG PEN) 100 UNIT/ML pen Inject 2 Units subcutaneously 3 times daily (with meals). Refer to Diabetes Team instructions for more details.  Qty: 15 mL, Refills: 0    Associated  Diagnoses: S/P pancreatic islet cell transplantation (H); Type 1 diabetes mellitus without complication (H)      !! insulin aspart (NOVOLOG PEN) 100 UNIT/ML pen Inject 1-7 Units subcutaneously 2 times daily. Correction Scale - Custom INSULIN RESISTANCE DOSING while TPN is running overnight. 1 per 35 >/=140. For  to 174 give 1 units. For  to 209 give 2 units. For  to 244 give 3 units. For  to 279 give 4 units. For  to 314 give 5 units. For  to 359 give 6 units. For BG >/= to 360 give 7 units.  Qty: 15 mL, Refills: 0    Associated Diagnoses: S/P pancreatic islet cell transplantation (H); Type 1 diabetes mellitus without complication (H)      insulin glargine (LANTUS PEN) 100 UNIT/ML pen Inject 4 Units subcutaneously every 24 hours.  Qty: 2 mL, Refills: 0    Comments: If Lantus is not covered by insurance, may substitute Basaglar or Semglee or other insulin glargine product per insurance preference at same dose and frequency.    Associated Diagnoses: S/P pancreatic islet cell transplantation (H); Type 1 diabetes mellitus without complication (H)      miconazole (MICATIN) 2 % external powder Apply topically 2 times daily as needed for other (candidiasis).  Qty: 43 g, Refills: 0    Associated Diagnoses: Candidiasis of skin      oxyCODONE (ROXICODONE) 5 MG/5ML solution Take 5-10 mLs (5-10 mg) by mouth or G tube every 6 hours as needed for severe pain.  Qty: 150 mL, Refills: 0    Associated Diagnoses: Acute post-operative pain      !! prochlorperazine (COMPAZINE) 10 MG tablet Take 1 tablet (10 mg) by mouth every 6 hours as needed for nausea or vomiting.  Qty: 20 tablet, Refills: 0    Associated Diagnoses: Nausea and vomiting, unspecified vomiting type      traZODone (DESYREL) 50 MG tablet Take 1 tablet (50 mg) by mouth at bedtime.  Qty: 7 tablet, Refills: 0    Associated Diagnoses: Acute post-operative pain       !! - Potential duplicate medications found. Please discuss with provider.         CONTINUE these medications which have NOT CHANGED    Details   acetaminophen (TYLENOL) 325 MG/10.15ML solution 20.3 mLs (650 mg) by Per J Tube route every 6 hours as needed for mild pain or fever  Qty: 473 mL, Refills: 4    Associated Diagnoses: Gastroparesis      baclofen (LIORESAL) 10 MG tablet 1-2 tablets (10-20 mg) by Per G Tube route 3 times daily  Qty: 60 tablet, Refills: 1    Associated Diagnoses: Abdominal pain, generalized      cetirizine (ZYRTEC) 10 MG tablet 1 tablet (10 mg) by Per G Tube route 2 times daily  Qty: 60 tablet, Refills: 11    Comments: The patient requests that this prescription be held on file for filling in the near future.  Associated Diagnoses: Seasonal allergic conjunctivitis      !! COMPOUNDED NON-CONTROLLED SUBSTANCE (CMPD RX) - PHARMACY TO MIX COMPOUNDED MEDICATION Administer 40 mg amitriptyline suspension (2 mg/mL) via G-J tube at bedtime.  Qty: 600 mL, Refills: 5    Comments: Patient has had difficulty with crushing amitriptyline tablets and administering via tube. This has led to erratic absorption and noticeable side effects.  Associated Diagnoses: Abdominal pain, generalized      diphenhydrAMINE (BENADRYL) 12.5 MG/5ML solution Take 25 mg by mouth 4 times daily as needed for other (nausea)      DULoxetine HCl 60 MG CSDR 1 capsule (60 mg) by Per J Tube route daily Sprinkle caps for feeding tube  Qty: 90 capsule, Refills: 3    Comments: The patient requests that this prescription be held on file for filling in the near future.  Associated Diagnoses: Situational depression      estradiol (VAGIFEM) 10 MCG TABS vaginal tablet INSERT 1 TABLET(10 MCG) VAGINALLY 2 TIMES A WEEK  Qty: 24 tablet, Refills: 2    Comments: The patient requests that this prescription be held on file for filling in the near future.  Associated Diagnoses: Atrophic vaginitis      famotidine (PEPCID) 40 MG/5ML suspension 2.5 mLs (20 mg) by Per J Tube route daily  Qty: 50 mL, Refills: 4    Associated  "Diagnoses: Gastroparesis      ibuprofen (ADVIL/MOTRIN) 400 MG tablet take 30 mls  by oral route  every 4 - 6 hours as needed      !! insulin aspart (NOVOLOG FLEXPEN) 100 UNIT/ML pen Take 1 unit if BG is 175-275, and 2 units if >275 up to every 4 hours as needed.  \"Prime\" pen needle with 2 unit airshot before giving, needs supply for 15 unit/day.  Qty: 15 mL, Refills: 11    Comments: Substitute with Fiasp, Novolin R, etc if not covered.  Associated Diagnoses: Type 1 diabetes mellitus without complication (H); Post-pancreatectomy diabetes (H); Hypoglycemia after GI (gastrointestinal) surgery; Diabetes insipidus (H)      !! insulin aspart (NOVOLOG PEN) 100 UNIT/ML pen Take 1 unit if BG is 175-275, and 2 units if >275 up to every 4 hours as needed.  \"Prime\" pen needle with 2 unit airshot before giving, needs supply for 15 unit/day.  Qty: 15 mL, Refills: 11    Associated Diagnoses: Type 1 diabetes mellitus without complication (H)      lactated ringers infusion Inject 1,000 mLs into the vein daily as needed      levothyroxine (SYNTHROID) 25 mcg/mL SUSP Place 6 mLs (150 mcg) into J tube daily.  Qty: 540 mL, Refills: 3    Comments: The patient requests that this prescription be held on file for filling in the near future.  Associated Diagnoses: Hypothyroidism, unspecified type      lidocaine (LIDODERM) 5 % patch Place onto the skin as needed. To prevent lidocaine toxicity, patient should be patch free for 12 hrs daily.      lipase-protease-amylase (CREON) 92637-96670-751327 units CPEP per EC capsule Take 3-4 capsules by mouth 3 times daily (with meals) With oral meals  Qty: 150 capsule, Refills: 11    Associated Diagnoses: Pancreatic insufficiency      meclizine (ANTIVERT) 25 MG tablet Take 25 mg by mouth 3 times daily as needed.      methocarbamol (ROBAXIN) 750 MG tablet Take 1 tablet (750 mg) by mouth 4 times daily as needed for muscle spasms  Qty: 120 tablet, Refills: 11    Associated Diagnoses: Chronic pain syndrome    "   montelukast (SINGULAIR) 10 MG tablet Take 10 mg by mouth at bedtime.      pantoprazole (PROTONIX) 40 mg IV push injection Inject 40 mg into the vein daily (with breakfast)  Qty: 30 each, Refills: 11    Comments: Patient gets it from her home infusion  Associated Diagnoses: Gastroparesis      parenteral nutrition - PTA/DISCHARGE ORDER The TPN formula will print on the After Visit Summary Report.  Qty: 1 each, Refills: 0    Associated Diagnoses: Malnutrition, unspecified type (H)      !! prochlorperazine (COMPAZINE) 10 MG tablet Take 1 tablet (10 mg) by mouth every 6 hours as needed for nausea or vomiting  Qty: 120 tablet, Refills: 3    Associated Diagnoses: Nausea and vomiting, unspecified vomiting type      promethazine 25 mg Inject 25 mg into the vein 3 times daily      rimegepant (NURTEC) 75 MG ODT tablet Place 1 tablet (75 mg) under the tongue daily as needed for migraine Maximum of 1 tablet every 24 hours.  Qty: 8 tablet, Refills: 11    Associated Diagnoses: Migraine with aura and without status migrainosus, not intractable      scopolamine (TRANSDERM) 1 MG/3DAYS 72 hr patch Place 1 patch onto the skin every 72 hours - Transdermal  Qty: 10 patch, Refills: 11    Associated Diagnoses: Slow transit constipation      SYNDROS 5 MG/ML oral soln TAKE 0.5 ML BY MOUTH 2 TIMES DAILY  Qty: 60 mL, Refills: 0    Associated Diagnoses: Adult failure to thrive; Malnutrition, unspecified type (H); History of food intolerance      ZOLMitriptan (ZOMIG-ZMT) 5 MG ODT Take 1 tablet (5 mg) by mouth at onset of headache for migraine Dissolve tablet in the mouth. May repeat in 2 hours. Max 2 tablets/24 hours.  Qty: 12 tablet, Refills: 6    Associated Diagnoses: Migraine with aura and without status migrainosus, not intractable      !! COMPOUNDED NON-CONTROLLED SUBSTANCE (CMPD RX) - PHARMACY TO MIX COMPOUNDED MEDICATION Administer 60 mg amitriptyline 2 mg/ml via G-J tube at bedtime  Qty: 900 mL, Refills: 3    Comments: Patient has had  "difficulty with crushing amitriptyline tablets and administering via tube. This has led to erratic absorption and noticeable side effects.  Associated Diagnoses: Abdominal pain, generalized      !! Continuous Blood Gluc Sensor (FREESTYLE LISA 3 SENSOR) MISC CHANGE EVERY 14 DAYS  Qty: 2 each, Refills: 11    Associated Diagnoses: Type 1 diabetes mellitus with hypoglycemia and without coma (H)      Digestive Enzyme Cartridge (RELIZORB) MARCO 2 Cartridges daily  Qty: 60 each, Refills: 6    Associated Diagnoses: Pancreatic insufficiency      glucagon 1 MG kit Give 0.1 to 0.15mg( 10-15 units on Insulin sryinge) subcutaneous  every 15 minutes PRN for hypoglycemia. Remix new kit q24hr. Needs up to 3 kit/week.  Qty: 10 each, Refills: 3    Associated Diagnoses: Hypoglycemia      glucose 40 % GEL gel Take 15-30 g by mouth every 15 minutes as needed for low blood sugar  Qty: 3 Tube, Refills: 2    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      insulin syringe-needle U-100 (30G X 1/2\" 0.3 ML) 30G X 1/2\" 0.3 ML miscellaneous Use 3 syringes daily or as directed.  Qty: 100 each, Refills: 11    Associated Diagnoses: Hypoglycemia      Nutritional Supplements (BOOST HIGH PROTEIN) LIQD After above baseline labs are drawn, give: 6 mL/kg to maximum of 360 mL; the beverage is to be consumed within 5 minutes.  Refills: 0    Comments: If on pancreatic enzymes, patient may take home enzymes with Boost beverage.      Patients may take long acting insulin (Levemir and Lantus).      Patient should NOT cover Boost with Novolog, Humalog, Apidra, or regular insulin.  Associated Diagnoses: Pancreatic insufficiency; Post-pancreatectomy diabetes (H); Malabsorption      Sharps Container (BD SHARPS ) MISC 1 Container as needed  Qty: 1 each, Refills: 1    Associated Diagnoses: Post-pancreatectomy diabetes (H)      silver nitrate (ARZOL SILVER NIT APPLICATORS) 75-25 % miscellaneous Apply topically as needed for wound care Apply " "Silver Nitrate Sticks every 2-3 days until granulation tissue resolved.  \"Roll\" tip of Silver Nitrate Q tip directly onto the granulation tissue-bulging tissue area.  Have Agency or Home Care Nurse administer this, if you prefer.  Take care not to touch any healthy tissue or skin.  The tissue will turn white and eventually black & scab off.  May repeat if needed in the future for recurrence.  Qty: 30 applicator, Refills: 0    Associated Diagnoses: Encounter for care related to feeding tube      STATIN NOT PRESCRIBED (INTENTIONAL) Previous liver issues, risks vs benefits felt to not warrant statin.    Discussed Oct 2022 visit      zinc oxide - white petrolatum (CRITIC-AID THICK MOIST BARRIER) 20-51% PSTE topical paste Apply 71 g topically every hour as needed for rash (Under J tube bumper when needed for skin protection)  Qty: 170 g, Refills: 0    Associated Diagnoses: Epigastric pain       !! - Potential duplicate medications found. Please discuss with provider.        STOP taking these medications       buprenorphine (SUBUTEX) 2 MG SUBL sublingual tablet Comments:   Reason for Stopping:         IBSRELA 50 MG TABS Comments:   Reason for Stopping:         insulin detemir (LEVEMIR PEN) 100 UNIT/ML pen Comments:   Reason for Stopping:         MOVANTIK 25 MG TABS tablet Comments:   Reason for Stopping:         neomycin (MYCIFRADIN) 500 MG tablet Comments:   Reason for Stopping:         polyethylene glycol (MIRALAX) 17 GM/Dose powder Comments:   Reason for Stopping:         prochlorperazine (COMPRO) 25 MG suppository Comments:   Reason for Stopping:         sennosides (SENOKOT) 8.8 MG/5ML syrup Comments:   Reason for Stopping:         sodium phosphate, mineral oil, magnesium citrate enema Comments:   Reason for Stopping:                        Brief History of Illness:   59 year old female, PMH type 1 diabetes, chronic pancreatitis, s/p prior TPAIT in 2016, hypothyroidism, h/o pituitary adenoma s/p prior resection, slow " transit constipation, nausea, emesis, G-tube for venting (vents about 75% of the time, J-tube for tube feeds (runs at about 90mL/ hour over 13 hours), and TPN every Tues, Thurs, Sundays, also home IVF prn, chronic pain on buprenorphine with  [facility].  Now s/p Ex Lap, BAUDILIO, revision of J-tube, small bowel resection, TAC with ileorectal anastomosis and diverting loop ileostomy, flexible sigmoidoscopy on 10/11/2024.           Hospital Course:   Post-operatively pt was gently fluid resuscitated.  Rose was removed and pt was able to void but unfortunately experienced urinary retention and required rose replacement.  But was eventually able to repeat voiding trial and was able to void spontaneously.  Pt was started on continuous TPN while awaiting return of bowel function.  Pt had eventual stoma function and was able to tolerate small amounts of a low fiber diet for comfort.  Tube feeds were attempted but pt experienced increased abdominal pain that the tube feeds were stopped.  Pt also experienced tube feed leakage from around the J-tube.  Subsequently TF were re-attempted on POD #13.  Pt experienced intestinal cramping and spasms but also experienced quite a bit of leakage from around the J-tube and the pain due to the skin irritation was severe that the TF were stopped.  [facility] replaced an ostomy appliance around the J-tube along with pain medications helped quite a bit.  Decision was made to continue to hold TF and to discharge home with 7 days of cycled TPN and regular diet as tolerated.      Endocrine team followed along closely and managed blood glucose levels in setting of post-operative state, GI fluid losses, complex diet regimen.  At the time of discharge, please refer to Endocrine notes for discharge regimen.      Pt was seen by [facility] and taught how to manage her new ostomy. Pt was seen by PT/OT and deemed appropriate for discharge home. Post-operative pain was controlled with scheduled tylenol,  "ibuprofen, gabapentin, robaxin and prn oxycodone.  Home buprenorphine was not restarted and pt elected to continue without. Pt should follow up with provider at [facility] Pain clinic to further discuss.       Patient is to follow up in the Colon and Rectal Surgery Clinic in 2-3 week with Raeann Bowen NP or Harmony Rosenthal PA-C and then with Dr. Branch in 3-4 weeks after.          Day of Discharge Physical Exam:   Blood pressure 94/63, pulse 97, temperature 98.1  F (36.7  C), temperature source Oral, resp. rate 18, height 1.727 m (5' 8\"), weight 61.1 kg (134 lb 9.6 oz), SpO2 100%, not currently breastfeeding.    Gen: AAOx3, NAD  Pulm: Non-labored breathing  Abd: Soft, appropriately tender, no guarding/rebound   G-tube in place - venting at times   J-tube in place - with lateral sided skin break down.     Incision C/D/I with staples   Stoma pink and viable with liquid stool and gas in bag  Ext:  Warm and well-perfused         Final Pathology Result:     Final Diagnosis   A. OMENTUM, EXCISION:  - Fibroadipose tissue with focal acute inflammation.     B. COLON AND ILEUM, RESECTION:  - Small and large intestine with patchy serosal inflammation and fibrosis.  - Surgical margins are viable and unremarkable.  - Thirteen reactive lymph nodes (0/13).     C. JEJENUM AND J-TUBE SIDE, RESECTION:  - Segment of small intestine with contiguous squamous epithelium (ostomy site),  showing mild acute inflammation, reactive changes, mural defect (on gross examination) and serosal inflammation and fibrosis.   - Surgical margins are viable and unremarkable.  - Four reactive lymph nodes (0/4).              Discharge Instructions and Follow-Up:     Discharge Procedure Orders   Primary Care - Care Coordination Referral   Standing Status: Future   Referral Priority: Routine: Next available opening Referral Type: Care Coordination   Number of Visits Requested: 1     Home Infusion Referral   Referral Priority: Routine: Next " available opening Referral Type: Consultation   Number of Visits Requested: 1     Resume Home Care Services   Order Comments: Resume Home Care services through [facility] as prior to admission, add-on new ostomy.     Resume Home Care Services   Order Comments: HI to resume previous home enteral and TPN services.     Reason for your hospital stay   Order Comments: 10/11/2024:   Procedure:        Exploratory laparotomy, extensive lysis of adhesions, revision of Jtube and small bowel resection, total abdominal colectomy with ileorectal anastomosis, diverting loop ileostomy, flexible sigmoidoscopy.     Activity   Order Comments: Your activity upon discharge:   ACTIVITY  -No lifting, pushing, pulling greater than 10 lbs and no strenuous exercise for 6 weeks   -Do NOT insert anything into your anus/rectum for at least 8 weeks.  NO SUPPOSITORIES, NO ENEMAS. Please discuss with Dr. Branch prior.    -No driving while on narcotic analgesics (i.e. Percocet, oxycodone, Vicodin)  -No driving until you are able to fully twist to both sides or slam on brakes quickly and without any pain  -We encourage walking at least 4-5 times per day     Order Specific Question Answer Comments   Is discharge order? Yes      Adult [facility] Follow-up and recommended labs and tests   Order Comments: WOUND CARE   -Inspect your wounds daily for signs of infection (increased redness, drainage, pain)  -Keep your wound clean and dry  -You may shower, but do not soak in tub or pool  -If you have staples, they can be removed in 7-10 days in the Colorectal Clinic      NOTIFY  Please contact Kenya SANABRIA RN, Vicenta MAGAÑA RN or Megan MONK RN at [phone] for problems after discharge such as:  -Temperature > 101F, chills, rigors, dizziness  -Redness around or purulent drainage from wound  -Inability to tolerate diet, nausea or vomiting  -You stop passing gas (or your stoma stops functioning), develop significant bloating, abdominal pain  -Have blood in  stools/vomit  -Have severe diarrhea/constipation  -Any other questions or concerns.  - At nights (after 4:30pm), on weekends, or if urgent, call [phone] and ask the  to speak with the on-call Colorectal Surgery resident or fellow    -Measure your total daily ileostomy output and record.  If you have LESS than 500mL or GREATER than 1000mL of ileostomy output within a 24 hour period, please notify the Colorectal Nurse.  If you have greater than 1000-1200mL in a 24 hour period or greater than 500cc for three consecutive 8 hour shifts, take Imodium 2mg once, stay hydrated especially with Pedialyte and call the Colorectal Clinic to further discuss.      Medication Instructions  Some of your medications may have changed. Please take only prescribed and resumed medications     FOLLOW-UP  1.  You will need to follow-up with Raeann Bowen NP or Harmony Rosenthal PA-C in the Colon and Rectal Surgery clinic in 2-3 week(s) and then with CRS Staff: Dr. Branch in 4-5 weeks after.  Please contact our Nurses (phone # [phone]) if you have not heard from our clinic in 3 business days afer discharge to schedule a follow-up appointment.  Please bring your log of daily ileostomy outputs to your follow up clinic appointment.     2.  Follow up with your primary care provider in 1-2 weeks after discharge from the hospital to review this hospitalization.     3.  We recommend that you follow up with [facility] in 1 weeks to reassess and discuss whether Buprenorphine should be restarted/re-titrated.         ORAL REHYDRATION RECIPE for Home Use  With your new ileostomy, you are at increased risk for dehydration,  Especially if you have high ileostomy outputs, or low appetites, you can help prevent it by drinking this mixture as directed.  Some examples of commercially prepared Oral Rehydration Solutions are: Pedialyte (solution, powder packet, Freeze Pops), or Drip Drop.     Electrolyte Water   -4  "cups of water (equal to 1 quart or 32 ounces)   -  teaspoon salt   -6 teaspoons sugar   -Crystal Light if desired (or other choice Nutrasweet or Splenda flavoring - SUGAR-FREE) to taste (recommend lemonade or orange-pineapple flavors, but any flavor will work)    Add two cups of tap water to a large container. With measuring spoons (not table silverware), add the dry ingredients and stir or shake well. Add two additional cups of water. This will equal one quart of Electrolyte Water. Add sugar-free flavoring if desired. Best if chilled. Sip solution throughout the day. Discard after 24 hours.            Appointments on [place] and/or [facility] (with [facility] or [facility] provider or service). Call [phone] if you haven't heard regarding these appointments within 7 days of discharge.     Miscellaneous DME Order   Order Comments: Your Medical Supplier will need your surgeon's name, phone and fax number    Clinic:                     Phone                            Fax  Colorectal Surgery:    [phone]                                                                                                   Authorizing MD: Dr. Branch    Quantity of pouches:   20  /mo.    Request the following supplies:    Ileostomy  [place]    2 piece flat wafer 57mm  # [id number]                           2 piece opaque Fecal pouch without Filter 57mm- # 37095  Accessories  2\" barrier ring #[id number]     Adapt powder #7906    No sting film barrier # 3345                           Adapt universal adhesive remover #1231                          Adapt M-9 Spray room deodorizer #7736                           [place] barrier extenders #30543    Jtube:   [place]    2 piece flat wafer 57mm  # 66938   Urine pouch 57mm- # 93657  Accessories      Eliseo paste #[id number]    Adapt powder #7906    No sting film barrier # 3345    Change 2-3x a week                                   Change your pouch 2 times a week, more often if " leaking.        DME Documentation:   Describe the reason for need to support medical necessity: s/p ileostomy and J-tube revision with leaking around J-tube    I, the undersigned, certify that the above prescribed supplies are medically necessary for this patient and is both reasonable and necessary in reference to accepted standards of medical and necessary in reference to accepted standards of medical practice in the treatment of this patient's condition and is not prescribed as a convenience.     Order Specific Question Answer Comments   PATIENT INSTRUCTIONS: Please contact your preferred vendor or insurance provider for assistance in obtaining the ordered medical equipment item.    Start Date: 10/25/2024    DME Item Needed: ileostomy supplies and J-tube ostomy appliance    Length of Need: 12 months    DME Item Quantity: 20 / month      Diet   Order Comments: DIET: Regular oral Diet as tolerated. Avoid high fiber foods. Continue full strength TPN.  Keep prior to admission orders for Tube feeds, but hold this on discharge & can resume TF later as an outpatient as tolerated/preferred. Eat slowly, chew thoroughly, eat small frequent meals. Stay well hydrated. Follow food recommendations given by Nutritionist & Ostomy Nurse. Record all oral/food intake to track calories consumed.     Order Specific Question Answer Comments   Is discharge order? Yes             Home Health Care:     Arranged           Discharge Disposition:     Discharged to home      Condition at discharge: Stable        Staff Addendum:  Agree with the discharge summary as documented. I was personally involved with the discharge planning and hospital decision-making for this patient.  Kaylene Branch MD  Colon and Rectal Surgery Staff  [facility]

## 2024-10-24 NOTE — PROGRESS NOTES
"[facility]  WO Nurse Inpatient Assessment     Consulted for: Diverting loop ileostomy  10/18: new consult for J tube leakage    10/21 summary:  pain around J tube site likely mostly related to suture wound.  Improvement of seal around the tube with stabilization.  Anticipate that will be able to discontinue pouching within this week  10/24: Went to drop off ostomy resources and small mirror and pt showed WOC her abdomen and explained they are starting feeds thru the J-tube today so they removed the pouch. [facility] will adjust orders today and return 10/25 for ileostomy pouch change and to assess plan for j-tube    Patient History (according to provider note(s):      \"58 y/o female has a past medical history of Allergic rhinitis, cause unspecified, Allergy to other foods, Arthritis, Choledocholithiasis, Chronic abdominal pain, Chronic constipation, Chronic nausea, Chronic pancreatitis (H), Degeneration of lumbar or lumbosacral intervertebral disc, Esophageal reflux, Gastroparesis, Hiatal hernia, History of pituitary adenoma, Hypothyroidism, Migraines, Mild hyperlipidemia, On tube feeding diet, Pancreatic disease, PONV (postoperative nausea and vomiting), Portacath in place, Type 1 diabetes mellitus without complication (H) (5/9/2022), and Unspecified hearing loss.\"     Assessment:      Tube type and location:  J tube in LUQ    Last photo 10/18/24  History: placed in OR 10/11/24.  10/17/24 CT scan showed J tube in good position   Current Tube Securement: bumper sutured to skin   16 Fr tube with 1 lumens   Leakage around tube: small  Description of leakage/drainage:  bilious  Insertion site assessment:  approximately 0.1 cm gap between the tube and skin  Peritubular skin assessment:  bright red Erythema extending up to 0.3 cm from insertion site  Wound at 6 o'clock where the bumper had been sutured to the skin: 0.5 x 0.3 x 0.1 cm with pink dermal base     Palpation of the wound " bed: normal      Wound Drainage: none     Description of drainage: none  ?? Temperature? normal   Pain: mild, tender  Pain interventions prior to dressing change: patient tolerated well  STATUS: improving seal around the tube.  Pain likely related to drainage on the suture wound  Supplies ordered: gathered       Assessment of new end Ileostomy:  Diagnosis Pertinent to Stoma:  Slow transit constipation       Surgery Date: 10/11/24  Surgeon:[facility]: [facility]  Pouching system in place on assessment today: [place] two piece, cut to fit, flat, and barrier ring   Pouch barrier status: Minimal melting  Pouch last changed/wear time:,24 hrs- leaked multiple times overnight into 10/23- staff may have been using wet wipes on skin which can affect adhesion so removed them from room.   Reason for pouch change today: pouch not changed today  Effectiveness of current pouching/ supply plan: Effective  Change made with ostomy management today: No  Pouching system in place after visit today: [place] 2 piece flat cut to fit with barrier ring    Supplies: gathered, at bedside, supplies stored on unit, and discussed with patient     Stoma location: RLQ  Stoma size: 1 1/4 inches  Stoma appearance: red, round, moist, edematous, and protruberant  Mucocutaneous junction:  intact  Peristomal complication(s): none  Output: watery and yellow  Output volume emptied during visit: 75 ml  Abdominal assessment: Firm  Surgical site(s): open to air and staples intact  NG still in place? No  Pain: Sharp  Is patient still on a PCA? No    Ostomy education assessment:  Participant of teaching session today: patient   Education completed today: Pouching system assessment , Pouch change return demonstration, Pouch emptying return demonstration, Importance of hydration, Low fiber diet , Odor/flatus management , and Infection prevention/hygiene   Educational materials/methods: Verbal, Written, FOD Coggon education hand out, and  "Addressed patient/caregiver questions/concerns  Education still needed: Discharge instructions  Learning Comprehension:   Psychosocial assessment: is a retired  and has had to manage TPN, drains at home with home care assist   Patient readiness for education today: attentive and active participation  Following visit: patient  is able to demonstrate;         1. How to empty their pouch? Yes and Independent        2. How to change their pouch? Yes and with minimal assist        3. How to read and record intake and output correctly? Yes and Demo provided   Preparation for discharge completed: Placed prescription recommendations in discharge navigator for MD to sign, Ensured patient has extra supplies for discharge, Discussed how to order supplies after discharge , Ordered samples from  after gaining consent from patient/caregiver, Discussed how and when to make an outpatient WOC nurse appointment after discharge, Prepared for discharge home with home care, and Discuss signs/symptoms of when to seek medical attention  Preparation for discharge still needed: none  Pt support system on discharge: spouse  WOC recommend home care? Yes  Face to face time: 15 minutes    Treatment Plan:     J tube site wound(s): BID and PRN when soiled  Cleanse skin around tube using NS and gauze or Christina spray and dry soft wipes  Apply ostomy powder onto any open areas (will help paste stick better) then apply barrier paste (criticaid) over open and red areas  Cover with split gauze, secure with minimal tape      RLQ Ileostomy pouching plan:   Pouching system: ostomy supplies pouches: Pine 57 FECAL ([id number]) ostomy supplies barrier: Pine 57mm FLAT ([id number])  Accessories used: WOC ostomy accessories: 2\" Cera Barrier Ring ([id number])   Frequency of pouch changes: PRN leakage and Three times a week  WOC follow up plan: Twice weekly  Bedside RN interventions: Change pouch PRN if leaking using the supplies above, Empty " pouch when 1/3 to 1/2 full, ensure to clean pouch outlet after emptying to prevent odor, Notify WOC for ongoing pouch leakage, Document stoma appearance and output volume, color, and consistency every shift, and Encourage patient to empty pouch with assist     Outpatient plan:  Request the following supplies:      Jenison    2 piece flat wafer 57mm  # [id number]                           2 piece opaque Fecal pouch without Filter 57mm- # 51646  Accessories  2  barrier ring #[id number]     Adapt powder #7905    No sting film barrier # 5805                           Adapt universal adhesive remover #4079                          Adapt M-9 Spray room deodorizer #8605                           Jenison barrier extenders #25262                                 Change your pouch 2 times a week, more often if leaking.    DATA:     Current support surface: Standard  Standard gel mattress (Isoflex)  Containment of urine/stool: Ileostomy pouch  BMI: Body mass index is 20.47 kg/m .   Active diet order: Orders Placed This Encounter      Regular Diet Adult      Diet     Output: I/O last 3 completed shifts:  In: 1071.6 [P.O.:600; I.V.:300]  Out: 3750 [Urine:1700; Emesis/NG output:950; Stool:1100]     Labs:   Recent Labs   Lab 10/24/24  0523 10/22/24  0416 10/21/24  0621   ALBUMIN 3.4*  --  3.3*   PREALB  --   --  17.0*   HGB 9.5*   < > 8.6*   INR  --   --  1.12   WBC 12.8*   < > 19.1*    < > = values in this interval not displayed.     Pressure injury risk assessment:   Sensory Perception: 4-->no impairment  Moisture: 4-->rarely moist  Activity: 3-->walks occasionally  Mobility: 3-->slightly limited  Nutrition: 3-->adequate  Friction and Shear: 3-->no apparent problem  Martinez Score: 20

## 2024-10-24 NOTE — PROGRESS NOTES
Colorectal Surgery Progress Note  [facility]  POD#13      Subjective:  No acute events overnight. Pain is well controlled. Mild nausea, no vomiting. Tolerating Regular diet for comfort. On TPN. No fever/chills. Voiding independently, passing gas and stool via ileostomy bag. Discussed plans for discharge, patient looking forward to discharging home soon.    Vitals:  Vitals:    10/23/24 1948 10/23/24 2313 10/24/24 0401 10/24/24 0744   BP: 108/55 113/65 107/61 96/61   BP Location: Left arm Left arm Left arm Left arm   Cuff Size: Adult Regular   Adult Regular   Pulse:  101 97    Resp: 18 18 18 18   Temp: 98  F (36.7  C) 98  F (36.7  C) 97.8  F (36.6  C) 98.3  F (36.8  C)   TempSrc: Oral Oral Oral Oral   SpO2: 99% 99% 98% 98%   Weight:    61.1 kg (134 lb 9.6 oz)   Height:         I/O:  I/O last 3 completed shifts:  In: 1071.6 [P.O.:600; I.V.:300]  Out: 3750 [Urine:1700; Emesis/NG output:950; Stool:1100]    Physical Exam:  Gen: AAOx3, NAD  Pulm: Non-labored breathing  Abd: Soft, non-distended, appropriately tender, no guarding   Incision C/D/I   Stoma pink and viable with stool and gas in bag   G tube in place.               J tube in place, pouched. improved skin erosion around tube site - with ostomy appliance around it with bilious drainage.  Ext:  Warm and well-perfused    BMP  Recent Labs   Lab 10/24/24  0750 10/24/24  0523 10/24/24  0522 10/24/24  0034 10/23/24  1151 10/23/24  0916 10/23/24  0857 10/23/24  0527 10/22/24  0418 10/22/24  0416 10/21/24  2010 10/21/24  1824   NA  --  137  --   --   --  136  --   --   --  137  --  135   POTASSIUM  --  3.8  --   --   --  4.5  --   --   --  4.3  --  4.4   CHLORIDE  --  103  --   --   --  101  --   --   --  103  --  101   CO2  --  25  --   --   --  23  --   --   --  22  --  23   BUN  --  18.5  --   --   --  22.4*  --   --   --  19.3  --  16.4   CR  --  0.57  --   --   --  0.57  --   --   --  0.58  --  0.67   * 101* 106* 202*   < > 197*    < >  --    < > 168*   < > 142*   MAG  --  2.1  --   --   --  2.3  --  2.7*  --  2.3  --   --    PHOS  --  4.2  --   --   --  4.3  --  7.9*  --  3.8  --   --     < > = values in this interval not displayed.     CBC  Recent Labs   Lab 10/24/24  0523 10/23/24  0527 10/22/24  0416 10/21/24  0621   WBC 12.8* 18.2* 19.4* 19.1*   HGB 9.5* 9.0* 8.2* 8.6*   HCT 30.6* 28.6* 26.9* 27.0*   * 1,119* 955* 872*         ASSESSMENT: This is a 59 year old female PMH DM type 1, chronic pancreatitis, gastroparesis, s/p prior pancreatectomy with auto islet transplant in 2016, chronic abdominal pain on buprenorphine, GJ tube, J for feeds and G for decompression and needs to vent 75% of the time, thrice weekly TPN on Sun-Tues-Thurs for slow transit constipation and gastroparesis. Now SP ex lap, BAUDILIO, SBR, revision of J tube and TAC with ileorectal anastomosis and DLI on 10/11.     Today:  - Start Tube Feeds per J-tube. 10 ml/hr. Do not advance until discussed with surgery team.  - No meds per J tube. Please give medications via G-tube. Vent G-tube.  - Continue TPN. Continue reg diet for comfort.  - WBC downtrending. Continue abx.        Neuro/Pain: Tylenol, baclofen, flexeril, enzo, oxy, dilaudid   CV: N/A  PULM: encourage IS.  GI/FEN: TPN. Reg diet for comfort  : Voiding independently  Heme: N/A  ID: Follow WBC. Continue Zosyn/Vanc.  Endocrine: SSI  Other: N/A  Lines: G Tube for decompression. Ok to give meds via G Tube.               J tube: starting TF today. No meds per J tube.   Activity: as tolerated.  Ppx: LVNX  Dispo: pending toleration of tube feeds. Will try to wean off TPN or at least go back to PTA regimen (3 days per week). Will aim for Monday/Tuesday discharge if TF tolerated.      Clinically Significant Risk Factors               # Hypoalbuminemia: Lowest albumin = 3.1 g/dL at 10/14/2024  7:34 AM, will monitor as appropriate                # Moderate Malnutrition: based on nutrition assessment    #  Financial/Environmental Concerns: none           Patient seen and discussed with Dr. Esteban, who discussed with Dr. Branch.    Keith Hernandez MD  General Surgery PGY-1  Pager: [phone]

## 2024-10-24 NOTE — PLAN OF CARE
[id number]    VSS, on RA. A/Ox4, UAL. Pain relieved by scheduled Tylenol, Lidocaine patch, and PRN Flexeril and Oxy. Intermittent nausea relieved by Compazine. Patient reported increased anxiety, PRN Ativan given with relief of symptoms. G tube clamped. J tube clamped, open to gravity drainage when not eating or taking medication. Jejunostomy with good output, no leakage overnight. Colostomy with good output. Midline incision intact, no drainage. Q4 BG checks. Full liquid diet, tolerating well. Cycled TPN and lipids infusing through CVC, red lumen. Purple lumen, infusing TKO. Continue to follow POC.     Goal Outcome Evaluation:    Plan of Care Reviewed With: patient    Overall Patient Progress: no change

## 2024-10-24 NOTE — PHARMACY-CONSULT NOTE
Pharmacy Tube Feeding Consult    Medication reviewed for administration by feeding tube and for potential food/drug interactions.    Recommendation: Recommend holding tube feedings for 1 hours before and 1 hours after administration of  Levothyroxine .    Pharmacy will continue to follow as new medications are ordered.

## 2024-10-24 NOTE — CONFIDENTIAL NOTE
[facility] Call Center    Phone Message    May a detailed message be left on voicemail: yes     Reason for Call: Other: per in patient referral pt needs post hosp follow up 1-2 weeks with Dr harry, scheduled next available, added to wait list     Action Taken: Other: primary    Travel Screening: Not Applicable     Date of Service:

## 2024-10-24 NOTE — PLAN OF CARE
Goal Outcome Evaluation:      Plan of Care Reviewed With: patient    Overall Patient Progress: improvingOverall Patient Progress: improving    Outcome Evaluation: TF is  started at initial rate 10 ml/hr. Patient is tolerating feed  at this time. GT  site was cleansed and dressed with sterile gauze. Pt is up to bathroom independently. Pt is mostly independent with her drains and requires a minimal education with hand hygiene only. Please continue to assist as needed.

## 2024-10-24 NOTE — PROGRESS NOTES
"CLINICAL NUTRITION SERVICES - BRIEF NOTE     Nutrition Prescription      Recommendations already ordered by Registered Dietitian (RD):  1. Start trickle TF: [employer] Peptide 1.5 (plain) @ 10 mL/hr via J-tube (33 g CHO/day at this rate).  30 mL q4h free water flushes for FT patency    *Addendum @ 0910: per pharmacy note, \"Recommend holding tube feedings for 1 hours before and 1 hours after administration of  Levothyroxine .\"  RD updated TF orders to reflect this, and alerted Endo as well     RELIZORB CARTRIDGES (pt uses PTA, pt with hx TPAIT)  *Change 1 cartridge every 24 hours with TF rate @ 10-20 ml/hr  *Change 1 cartridge every 12 hours with TF rate >20 ml/hr  *Supplies: Obtain cartridges in the unit med room or call [phone] and provide peoplesoft #[phone]     **Discussed plan to start trickle TF with Endo    2. Hepatic panel add-on and TG level (@ noon, to avoid using AM labs when IV lipids were running)        * Received consult: Registered Dietitian to Assess and Order TF per Medical Nutrition Therapy Guidelines (Comments: \"Only trickle feeds at 10 ml/hr. Please do not advance until further discussed with surgical team.   Please administer via J-Tube. \")    *See RD notes on 10/18 and 10/23 for previous recent RD notes this week    EVALUATION OF THE PROGRESS TOWARD GOALS   Diet: Regular    Nutrition Support: TPN (cycled for 12 hours overnight from 8pm-8am): 1440 mL/day with 200 g dextrose, 100 g AA, and 250 mL 20% IV SMOFlipid daily to provide 1580 kcal (25 kcal/kg), 1.6 g/kg PRO, GIR 4.6, and 32% kcal from fat per dosing weight        NEW FINDINGS   Labs:   - K+, Mg++, and Phos WNL  - BGs 101-202 since last night    Meds:   - Creon 24, 3-4 capsules TID with meals  - Lantus  - Novolog, 1 unit per 15 g CHO TID with breakfast, lunch, and dinner  - Novolog, medium sliding scale insulin TID @ 0800, 1200, and 1600  - Novolog, custom sliding scale insulin TID @ 0000, 0400, and 2000     INTERVENTIONS  See " above    Monitoring/Evaluation  Progress toward goals will be monitored and evaluated per protocol.      Magi Estrella RD, , LD  Weekday Units covered: 5A (non-Heme Onc pts) and 7B ([id number])  Available by 5A or 7B Clinical Dietitian Pallavi  Weekend Coverage: Weekend Clinical Dietitian Pallavi    Inpatient Clinical Dietitians no longer available via paging

## 2024-10-24 NOTE — PROGRESS NOTES
Inpatient Diabetes Management Service: Daily Progress Note     HPI: Dinora Mcghee is a 59 year old with pancreatogenic diabetes s/p pancreatectomy and TPAIT in 2016,complicated by gastroparesis, as well as a comorbid history of HTN,HLD, Hypothyroidism, chronic nausea/and constipation, chronic malnutrition and TPN and Tube feeds dependent , who was admitted on 10/11/2024 for exploratory laparotomy, extensive lysis of adhesions, revision of J tube,and small bowel resection. Inpatient Diabetes Service consulted for management of diabetes in the setting of continuous TPN use and expected TF use in future. G and J tube on gravity          Assessment/Plan:     Assessment:   Pancreatogenic diabetes s/p pancreatectomy and TPAIT in 2016, off insulin approx 4 years, currently req insulin and c/b  gastroparesis.  A1C-5.4% 7/23/2024  S/p ex lap, extensive lysis of adhesions, revision of J tube,and small bowel resection on 10/11/24  Chronic malnutrition: TPN cycled, TF resuming at trickle 10/24  Abx for postoperative infection/Jtube leakage and cellulitis   BMI:  20    Plan/Recommendations:     - Reduce to Regular insulin 22 units with cycled TPN dextrose at 200 g (at goal) = 0.11 units/gram dextrose.   - Lantus 4 units q 24 hrs at 1600 - maintain for start of trickle tube feeds today 10/24  - Novolog carb coverage: 1 unit per 15 g cho TID AC and 1/20 prn snacks/supplements    - will decrease all to 1:20 g cho starting at 1600  - Novolog Correction Scale: as TF titrates - likely to need additional adjustment  - 1:50 >140 at 08:00, 12:00 pm and 16:00 (TPN off)  - 1:35 >140 at 20:00, 00:00 and 04:00 (during TPN run)  - BG monitoring: Every 4 hours.  - Hypoglycemia protocol    - Carb counting protocol  - * note - TF to be held 1 hour before and 1 hour following Levothyroxine in AM*  depending on trends/dosing will make recommendations to avoid hypoglycemia during this time.  Anticipate a D10 PRN order as  appropriate. Trending.     Discussion:     Under target. Initial plan to reduce basal however, TF will start at trickle.     BG under target and reduced to 94 mg/dL following ICR given for 30 g cho - will reduce ICR further starting at 1600 hrs.     Lab review - non-contributory        Discharge Planning: (tentative)  Medications: TBD  Test Claims: lantus as current levemir is being discontinued  Education:  Unlikely to need, has given insulin before and used correction scale.   Outpatient Follow-up:  recommend  PCP Juan Jose Gomez M.D at the [facility], last seen on 7.22.2024     Please notify Inpatient Diabetes Service if changes are planned to steroids, nutrition, TPN/TF and anticipated procedures requiring prolonged NPO status.         Interval History/Review of Systems :   - The last 24 hours progress and nursing notes reviewed.    - starting trickle TF at 10 ml/hr - goal not known at this time per RD.  - no acute events      Planned Procedures/Surgeries:  none    Inpatient Glucose Control:           Vocera to RN to confirm that the amount of novolog was not 30 unit(s) he confirms he gave 2 unit(s) - given trends, will still reduce the ICR                Recent Labs   Lab 10/24/24  0523 10/24/24  0522 10/24/24  0034 10/23/24  1955 10/23/24  1706 10/23/24  1151   * 106* 202* 126* 148* 203*             Medications Impacting Glycemia:   Steroids: None  D5W containing solutions/medications: None  Other medications impacting glucose: NOne         Nutrition:   Orders Placed This Encounter      Full Liquid Diet  Prior to admission: was on TPN on Sunday, Tuesday and Thursday nights.  Also on enteral feeds  Supplements: none     TF: 10/24:  Re-starting [email or web address] Peptide 1.5 @ 10 ml/hr - continuous (1.4 g cho per hour or 33 g cho in 24 hours)  Hx:   10/15/2024:  [employer] Peptide 1.5 (plain) @ 10 mL/hr via J-tube (only ran PM 10/15- 6AM 10/16).   TPN:  started cycle over 12 hours with same dextrose 200g at 8 pm at  "10/19/2024... current.  Continuous: dextrose 140 g (10/13) -->  dextrose 170 g (10/14-10/15) --> dextrose increased to 200 g (10/17 - 10/18)         Diabetes History: see full consult note for complete diabetes history   Diabetes Type and Duration: Since 2016, s/p pancreatectomy and TPAIT     PTA Medication Regimen: Takes insulin levemir 3 units daily and novolog 1 unit if BG is 175-275, and 2 units if >275 up to every 4 hours as needed. Reports some times she keeps on  giving correction whole night but other times does it once or not require it all.  Historical Diabetes Medications: As above     Glucose monitoring device and frequency: Checks with [employer] 3  Outpatient Diabetes Provider: Dr. Melissa (last visit 4/2024)       Physical Exam:   /61 (BP Location: Left arm)   Pulse 97   Temp 97.8  F (36.6  C) (Oral)   Resp 18   Ht 1.727 m (5' 8\")   Wt 60.7 kg (133 lb 12.8 oz)   SpO2 98%   BMI 20.34 kg/m    General:  well appearing, NAD, sitting up in chair. Resting - TF now running   Lungs: unlabored breathing on RA.  Mental Status:  Alert and oriented x3  Psych:  calm, cooperative, normal mood and affect          Data:     Lab Results   Component Value Date    A1C 5.4 07/23/2024    A1C 5.6 03/20/2024    A1C 5.4 02/17/2022    A1C 5.2 11/27/2021    A1C 5.5 09/18/2021    A1C 5.4 08/05/2021    A1C 5.7 05/12/2021    A1C 5.9 (A) 04/01/2021    A1C 5.5 12/17/2020    A1C 5.8 (H) 07/30/2020       ROUTINE IP LABS (Last four results)  BMP  Recent Labs   Lab 10/24/24  0523 10/24/24  0522 10/24/24  0034 10/23/24  1955 10/23/24  1151 10/23/24  0916 10/22/24  0418 10/22/24  0416 10/21/24  2010 10/21/24  1824     --   --   --   --  136  --  137  --  135   POTASSIUM 3.8  --   --   --   --  4.5  --  4.3  --  4.4   CHLORIDE 103  --   --   --   --  101  --  103  --  101   KASSI 8.6*  --   --   --   --  9.1  --  8.6*  --  8.4*   CO2 25  --   --   --   --  23  --  22  --  23   BUN 18.5  --   --   --   --  22.4*  --  19.3  --  " 16.4   CR 0.57  --   --   --   --  0.57  --  0.58  --  0.67   * 106* 202* 126*   < > 197*   < > 168*   < > 142*    < > = values in this interval not displayed.     CBC  Recent Labs   Lab 10/24/24  0523 10/23/24  0527 10/22/24  0416 10/21/24  0621   WBC 12.8* 18.2* 19.4* 19.1*   RBC 3.19* 2.89* 2.80* 2.77*   HGB 9.5* 9.0* 8.2* 8.6*   HCT 30.6* 28.6* 26.9* 27.0*   MCV 96 99 96 98   MCH 29.8 31.1 29.3 31.0   MCHC 31.0* 31.5 30.5* 31.9   RDW 13.4 13.6 13.3 13.4   * 1,119* 955* 872*     Inpatient Diabetes Service will continue to follow, please don't hesitate to contact the team with any questions or concerns.     Rosanne Claros, NATALY-CNP, BC-ADM   Inpatient Diabetes Service  Available on [employer] - when on duty.     To contact Inpatient Diabetes Service:     7 AM - 5 PM: Page the IDS GIGI following the patient that day (see filed or incomplete progress notes/consult notes under Endocrinology)    OR if uncertain of provider assignment: page job code [phone]    5 PM - 7 AM: First call after hours is to primary service.    For urgent after-hours questions, page job code for on call fellow: [phone]     I spent a total of 50 minutes on the date of the encounter doing prep/post-work, chart review, history and exam, documentation and further activities per the note including lab review, multidisciplinary communication, counseling the patient and/or coordinating care regarding acute hyper/hypoglycemic management, as well as discharge management and planning/communication.  See note for details.      Please notify inpatient diabetes service if changes are planned to steroids, nutrition, or if procedures are planned requiring prolonged NPO status.Diabetes Management Team job code: [phone]

## 2024-10-25 ENCOUNTER — APPOINTMENT (OUTPATIENT)
Dept: PHYSICAL THERAPY | Facility: CLINIC | Age: 59
End: 2024-10-25
Attending: COLON & RECTAL SURGERY
Payer: COMMERCIAL

## 2024-10-25 LAB
ANION GAP SERPL CALCULATED.3IONS-SCNC: 10 MMOL/L (ref 7–15)
BUN SERPL-MCNC: 17.5 MG/DL (ref 6–20)
CALCIUM SERPL-MCNC: 8.5 MG/DL (ref 8.8–10.4)
CHLORIDE SERPL-SCNC: 104 MMOL/L (ref 98–107)
CREAT SERPL-MCNC: 0.59 MG/DL (ref 0.51–0.95)
EGFRCR SERPLBLD CKD-EPI 2021: >90 ML/MIN/1.73M2
ERYTHROCYTE [DISTWIDTH] IN BLOOD BY AUTOMATED COUNT: 13.8 % (ref 10–15)
GLUCOSE BLDC GLUCOMTR-MCNC: 123 MG/DL (ref 70–99)
GLUCOSE BLDC GLUCOMTR-MCNC: 141 MG/DL (ref 70–99)
GLUCOSE BLDC GLUCOMTR-MCNC: 143 MG/DL (ref 70–99)
GLUCOSE BLDC GLUCOMTR-MCNC: 151 MG/DL (ref 70–99)
GLUCOSE BLDC GLUCOMTR-MCNC: 157 MG/DL (ref 70–99)
GLUCOSE BLDC GLUCOMTR-MCNC: 185 MG/DL (ref 70–99)
GLUCOSE SERPL-MCNC: 124 MG/DL (ref 70–99)
HCO3 SERPL-SCNC: 24 MMOL/L (ref 22–29)
HCT VFR BLD AUTO: 27 % (ref 35–47)
HGB BLD-MCNC: 8.4 G/DL (ref 11.7–15.7)
MAGNESIUM SERPL-MCNC: 2.2 MG/DL (ref 1.7–2.3)
MCH RBC QN AUTO: 30.2 PG (ref 26.5–33)
MCHC RBC AUTO-ENTMCNC: 31.1 G/DL (ref 31.5–36.5)
MCV RBC AUTO: 97 FL (ref 78–100)
PHOSPHATE SERPL-MCNC: 3.9 MG/DL (ref 2.5–4.5)
PLATELET # BLD AUTO: 1150 10E3/UL (ref 150–450)
POTASSIUM SERPL-SCNC: 4.3 MMOL/L (ref 3.4–5.3)
RBC # BLD AUTO: 2.78 10E6/UL (ref 3.8–5.2)
SODIUM SERPL-SCNC: 138 MMOL/L (ref 135–145)
WBC # BLD AUTO: 12.5 10E3/UL (ref 4–11)

## 2024-10-25 PROCEDURE — 250N000013 HC RX MED GY IP 250 OP 250 PS 637: Performed by: STUDENT IN AN ORGANIZED HEALTH CARE EDUCATION/TRAINING PROGRAM

## 2024-10-25 PROCEDURE — 250N000013 HC RX MED GY IP 250 OP 250 PS 637: Performed by: COLON & RECTAL SURGERY

## 2024-10-25 PROCEDURE — 250N000009 HC RX 250: Performed by: COLON & RECTAL SURGERY

## 2024-10-25 PROCEDURE — B4185 PARENTERAL SOL 10 GM LIPIDS: HCPCS | Performed by: COLON & RECTAL SURGERY

## 2024-10-25 PROCEDURE — 258N000003 HC RX IP 258 OP 636: Performed by: SURGERY

## 2024-10-25 PROCEDURE — 250N000011 HC RX IP 250 OP 636: Performed by: COLON & RECTAL SURGERY

## 2024-10-25 PROCEDURE — 80048 BASIC METABOLIC PNL TOTAL CA: CPT | Performed by: COLON & RECTAL SURGERY

## 2024-10-25 PROCEDURE — 250N000011 HC RX IP 250 OP 636: Performed by: PHYSICIAN ASSISTANT

## 2024-10-25 PROCEDURE — 250N000013 HC RX MED GY IP 250 OP 250 PS 637: Performed by: PHYSICIAN ASSISTANT

## 2024-10-25 PROCEDURE — 97116 GAIT TRAINING THERAPY: CPT | Mod: GP

## 2024-10-25 PROCEDURE — 250N000013 HC RX MED GY IP 250 OP 250 PS 637

## 2024-10-25 PROCEDURE — 250N000011 HC RX IP 250 OP 636: Performed by: SURGERY

## 2024-10-25 PROCEDURE — 250N000009 HC RX 250

## 2024-10-25 PROCEDURE — 83735 ASSAY OF MAGNESIUM: CPT | Performed by: COLON & RECTAL SURGERY

## 2024-10-25 PROCEDURE — 84100 ASSAY OF PHOSPHORUS: CPT | Performed by: COLON & RECTAL SURGERY

## 2024-10-25 PROCEDURE — 250N000011 HC RX IP 250 OP 636

## 2024-10-25 PROCEDURE — G0463 HOSPITAL OUTPT CLINIC VISIT: HCPCS

## 2024-10-25 PROCEDURE — 85014 HEMATOCRIT: CPT

## 2024-10-25 PROCEDURE — 250N000013 HC RX MED GY IP 250 OP 250 PS 637: Performed by: SURGERY

## 2024-10-25 PROCEDURE — 99232 SBSQ HOSP IP/OBS MODERATE 35: CPT | Mod: 24 | Performed by: STUDENT IN AN ORGANIZED HEALTH CARE EDUCATION/TRAINING PROGRAM

## 2024-10-25 PROCEDURE — 120N000002 HC R&B MED SURG/OB UMMC

## 2024-10-25 PROCEDURE — 258N000003 HC RX IP 258 OP 636: Performed by: COLON & RECTAL SURGERY

## 2024-10-25 PROCEDURE — 250N000009 HC RX 250: Performed by: STUDENT IN AN ORGANIZED HEALTH CARE EDUCATION/TRAINING PROGRAM

## 2024-10-25 PROCEDURE — 97530 THERAPEUTIC ACTIVITIES: CPT | Mod: GP

## 2024-10-25 RX ORDER — ACYCLOVIR 800 MG/1
TABLET ORAL
Qty: 6 EACH | Refills: 1 | Status: SHIPPED | OUTPATIENT
Start: 2024-10-25

## 2024-10-25 RX ORDER — HYDROXYZINE HYDROCHLORIDE 25 MG/1
25 TABLET, FILM COATED ORAL EVERY 6 HOURS PRN
Qty: 20 TABLET | Refills: 0 | Status: SHIPPED | OUTPATIENT
Start: 2024-10-25 | End: 2024-10-31

## 2024-10-25 RX ORDER — CYCLOBENZAPRINE HCL 10 MG
5-10 TABLET ORAL EVERY 8 HOURS PRN
Qty: 20 TABLET | Refills: 0 | Status: SHIPPED | OUTPATIENT
Start: 2024-10-25 | End: 2024-10-31

## 2024-10-25 RX ORDER — PROCHLORPERAZINE MALEATE 10 MG
10 TABLET ORAL EVERY 6 HOURS PRN
Qty: 20 TABLET | Refills: 0 | Status: SHIPPED | OUTPATIENT
Start: 2024-10-25

## 2024-10-25 RX ADMIN — ACETAMINOPHEN 975 MG: 325 SOLUTION ORAL at 17:01

## 2024-10-25 RX ADMIN — PANCRELIPASE 4 CAPSULE: 120000; 24000; 76000 CAPSULE, DELAYED RELEASE PELLETS ORAL at 13:44

## 2024-10-25 RX ADMIN — ACETAMINOPHEN 975 MG: 325 SOLUTION ORAL at 11:35

## 2024-10-25 RX ADMIN — PROMETHAZINE HYDROCHLORIDE 25 MG: 25 INJECTION, SOLUTION INTRAMUSCULAR; INTRAVENOUS at 15:56

## 2024-10-25 RX ADMIN — MAGNESIUM SULFATE HEPTAHYDRATE: 500 INJECTION, SOLUTION INTRAMUSCULAR; INTRAVENOUS at 21:18

## 2024-10-25 RX ADMIN — BACLOFEN 10 MG: 10 TABLET ORAL at 09:09

## 2024-10-25 RX ADMIN — OXYCODONE HYDROCHLORIDE 5 MG: 5 SOLUTION ORAL at 12:01

## 2024-10-25 RX ADMIN — LORAZEPAM 1 MG: 1 TABLET ORAL at 22:31

## 2024-10-25 RX ADMIN — OXYCODONE HYDROCHLORIDE 5 MG: 5 SOLUTION ORAL at 16:59

## 2024-10-25 RX ADMIN — BACLOFEN 10 MG: 10 TABLET ORAL at 21:19

## 2024-10-25 RX ADMIN — SMOFLIPID 250 ML: 6; 6; 5; 3 INJECTION, EMULSION INTRAVENOUS at 21:19

## 2024-10-25 RX ADMIN — CYCLOBENZAPRINE HYDROCHLORIDE 10 MG: 5 TABLET, FILM COATED ORAL at 16:03

## 2024-10-25 RX ADMIN — LIDOCAINE 4% 1 PATCH: 40 PATCH TOPICAL at 05:57

## 2024-10-25 RX ADMIN — PROMETHAZINE HYDROCHLORIDE 25 MG: 25 INJECTION, SOLUTION INTRAMUSCULAR; INTRAVENOUS at 21:19

## 2024-10-25 RX ADMIN — LEVOTHYROXINE SODIUM 150 MCG: 75 TABLET ORAL at 09:08

## 2024-10-25 RX ADMIN — ENOXAPARIN SODIUM 40 MG: 40 INJECTION SUBCUTANEOUS at 15:54

## 2024-10-25 RX ADMIN — BACLOFEN 10 MG: 10 TABLET ORAL at 15:49

## 2024-10-25 RX ADMIN — Medication 50 MG: at 21:38

## 2024-10-25 RX ADMIN — Medication 60 MG: at 21:38

## 2024-10-25 RX ADMIN — OXYCODONE HYDROCHLORIDE 10 MG: 5 SOLUTION ORAL at 21:37

## 2024-10-25 RX ADMIN — PROMETHAZINE HYDROCHLORIDE 25 MG: 25 INJECTION, SOLUTION INTRAMUSCULAR; INTRAVENOUS at 09:13

## 2024-10-25 RX ADMIN — LORAZEPAM 1 MG: 1 TABLET ORAL at 06:03

## 2024-10-25 RX ADMIN — HYDROXYZINE HYDROCHLORIDE 50 MG: 25 TABLET, FILM COATED ORAL at 21:37

## 2024-10-25 RX ADMIN — FAMOTIDINE 20 MG: 40 POWDER, FOR SUSPENSION ORAL at 09:08

## 2024-10-25 RX ADMIN — PIPERACILLIN AND TAZOBACTAM 3.38 G: 3; .375 INJECTION, POWDER, LYOPHILIZED, FOR SOLUTION INTRAVENOUS at 05:57

## 2024-10-25 RX ADMIN — PIPERACILLIN AND TAZOBACTAM 3.38 G: 3; .375 INJECTION, POWDER, LYOPHILIZED, FOR SOLUTION INTRAVENOUS at 09:37

## 2024-10-25 RX ADMIN — PROCHLORPERAZINE EDISYLATE 10 MG: 5 INJECTION INTRAMUSCULAR; INTRAVENOUS at 06:14

## 2024-10-25 RX ADMIN — VANCOMYCIN HYDROCHLORIDE 1250 MG: 10 INJECTION, POWDER, LYOPHILIZED, FOR SOLUTION INTRAVENOUS at 00:17

## 2024-10-25 RX ADMIN — LORAZEPAM 1 MG: 1 TABLET ORAL at 00:18

## 2024-10-25 RX ADMIN — FAMOTIDINE 20 MG: 40 POWDER, FOR SUSPENSION ORAL at 21:38

## 2024-10-25 RX ADMIN — AMOXICILLIN AND CLAVULANATE POTASSIUM 1 TABLET: 875; 125 TABLET, FILM COATED ORAL at 21:19

## 2024-10-25 RX ADMIN — DOXAZOSIN 1 MG: 4 TABLET ORAL at 09:09

## 2024-10-25 RX ADMIN — Medication 25 MG: at 21:19

## 2024-10-25 RX ADMIN — HYDROXYZINE HYDROCHLORIDE 50 MG: 25 TABLET, FILM COATED ORAL at 12:01

## 2024-10-25 RX ADMIN — CYCLOBENZAPRINE HYDROCHLORIDE 10 MG: 5 TABLET, FILM COATED ORAL at 06:03

## 2024-10-25 RX ADMIN — OXYCODONE HYDROCHLORIDE 5 MG: 5 SOLUTION ORAL at 06:03

## 2024-10-25 RX ADMIN — VANCOMYCIN HYDROCHLORIDE 1250 MG: 10 INJECTION, POWDER, LYOPHILIZED, FOR SOLUTION INTRAVENOUS at 11:35

## 2024-10-25 RX ADMIN — ACETAMINOPHEN 975 MG: 325 SOLUTION ORAL at 05:32

## 2024-10-25 RX ADMIN — PANCRELIPASE 4 CAPSULE: 120000; 24000; 76000 CAPSULE, DELAYED RELEASE PELLETS ORAL at 17:40

## 2024-10-25 NOTE — PLAN OF CARE
"Plan of Care Reviewed With: patient    Overall Patient Progress: improving    Outcome Evaluation: 6599-0473. AO x4. GJ tube - patent. VSS- RA.    Status: 8575-4551. Pt reported doing good - was up in chair all of shift. CVC DL PICC - R line infusing, P line hep locked. Pt receives cycled TPN/lipids at night, during the day pt is a reg diet - tolerating well. GJ tubed - clamped. Colostomy intact. AO x4. Independent. VSS- RA.       Labs: Mag/phos/k protocol - good and reordered for tomorrow. Platelet was 1150. Reviewed.      Pain/Nausea: Pain rated as high as 7/10 in abdomen. Oxy and flexeril given. Denies nausea.     New Changes: Oxy and flexeril given.     Plan: Monitor pain. Monitor drains. Encourage activity. Continue POC.     Vital signs:  Temp: 98.3  F (36.8  C) Temp src: Oral BP: 107/61 Pulse: 100   Resp: 18 SpO2: 98 % O2 Device: None (Room air) Oxygen Delivery: 2 LPM Height: 172.7 cm (5' 8\") Weight: 61.1 kg (134 lb 9.6 oz)  Estimated body mass index is 20.47 kg/m  as calculated from the following:    Height as of this encounter: 1.727 m (5' 8\").    Weight as of this encounter: 61.1 kg (134 lb 9.6 oz).       "

## 2024-10-25 NOTE — PLAN OF CARE
"Goal Outcome Evaluation: Progressing       Plan of Care Reviewed With: patient    Overall Patient Progress: improvingOverall Patient Progress: improving    Outcome Evaluation: Pt alert and oriented x4. R/A. VSS. afebrile. Continues on Supplemental tube feeds TPN, Lipids. Pt up to the bathroom independently. Voiding well. Appropriately manages drains. Pt did not eat all shift. Kept saying she will wait until late. She feels a bit nauseated. Oxycodone prn given x1-effective. Atarax per pt request given x1-effective. Compazine per pt request- given x1- effective. Discharge plans being put in place, hopefully for the weekend per provider. No further concerns at this time. will continue to monitor closely.    /71 (BP Location: Left arm)   Pulse 102   Temp 98.1  F (36.7  C) (Oral)   Resp 17   Ht 1.727 m (5' 8\")   Wt 61.1 kg (134 lb 9.6 oz)   SpO2 98%   BMI 20.47 kg/m     "

## 2024-10-25 NOTE — PROGRESS NOTES
Colorectal Surgery Progress Note  [facility]  POD#14      Subjective:  Having increased pain around J-tube site. Not tolerating tube feeds. Frustrated with situation and tired of struggling. Wants to stop tube feeds and go home on TPN. No fever/chills. Mild nausea, no vomiting. Voiding independently, passing gas and stool via ileostomy.    Vitals:  Vitals:    10/24/24 2110 10/25/24 0025 10/25/24 0500 10/25/24 0543   BP: 101/72 104/66  111/68   BP Location:  Left arm  Left arm   Cuff Size:       Pulse:       Resp:  18  16   Temp:  98.1  F (36.7  C) 98.1  F (36.7  C)    TempSrc:  Oral Oral    SpO2:  97%  100%   Weight:       Height:         I/O:  I/O last 3 completed shifts:  In: 80   Out: 2225 [Urine:400; Emesis/NG output:1400; Stool:425]    Physical Exam:  Gen: AAOx3, NAD  Pulm: Non-labored breathing  Abd: Soft, non-distended, tender around J-tube site, no guarding   Incision C/D/I with staples in place   Stoma pink and viable with soft/liquid stool and gas in bag   G tube in place.               J tube in place, with dressing around site, no leakage.  Ext:  Warm and well-perfused    BMP  Recent Labs   Lab 10/25/24  0531 10/25/24  0457 10/25/24  0020 10/24/24  2031 10/24/24  1559 10/24/24  0750 10/24/24  0523 10/23/24  1151 10/23/24  0916 10/23/24  0857 10/23/24  0527 10/22/24  0418 10/22/24  0416 10/21/24  2010 10/21/24  1824   NA  --   --   --   --   --   --  137  --  136  --   --   --  137  --  135   POTASSIUM  --   --   --   --   --   --  3.8  --  4.5  --   --   --  4.3  --  4.4   CHLORIDE  --   --   --   --   --   --  103  --  101  --   --   --  103  --  101   CO2  --   --   --   --   --   --  25  --  23  --   --   --  22  --  23   BUN  --   --   --   --   --   --  18.5  --  22.4*  --   --   --  19.3  --  16.4   CR  --   --   --   --   --   --  0.57  --  0.57  --   --   --  0.58  --  0.67   *  --  157* 156* 136*   < > 101*   < > 197*   < >  --    < > 168*   < > 142*   MAG   --   --   --   --   --   --  2.1  --  2.3  --  2.7*  --  2.3  --   --    PHOS  --  3.9  --   --   --   --  4.2  --  4.3  --  7.9*  --  3.8  --   --     < > = values in this interval not displayed.     CBC  Recent Labs   Lab 10/25/24  0530 10/24/24  0523 10/23/24  0527 10/22/24  0416   WBC 12.5* 12.8* 18.2* 19.4*   HGB 8.4* 9.5* 9.0* 8.2*   HCT 27.0* 30.6* 28.6* 26.9*   PLT 1,150* 990* 1,119* 955*         ASSESSMENT: This is a 59 year old female This is a 59 year old female PMH DM type 1, chronic pancreatitis, gastroparesis, s/p prior pancreatectomy with auto islet transplant in 2016, chronic abdominal pain on buprenorphine, GJ tube, J for feeds and G for decompression and needs to vent 75% of the time, thrice weekly TPN on Sun-Tues-Thurs for slow transit constipation and gastroparesis. Now SP ex lap, BAUDILIO, SBR, revision of J tube and TAC with ileorectal anastomosis and DLI on 10/11.     Continues to have difficulty tolerating tube feeds via J-Tube. Patient previously on home TPN and willing to go back home on TPN, let the J-tube mature and try trickle feeds at home at a later date.    Today:  - Stop Tube Feeds  - J-tube to gravity with appliance bag around it to prevent leakage.  - Continue TPN  - Continue regular diet for comfort  - Will plan to transition to PO antibiotics. Augmentin PO.  -  Vent G tube.      Neuro/Pain: Tylenol, baclofen, flexeril, enzo, oxy, dilaudid   CV: N/A  PULM: encourage IS.  GI/FEN: TPN. Reg diet for comfort  : Voiding independently  Heme: N/A  ID: Follow WBC. Continue Zosyn/Vanc today. Will transition to PO Augmentin.  Endocrine: SSI  Other: N/A  Lines: G Tube for decompression. Ok to give meds via G Tube.               J tube to gravity. Nothing via J tube. Stopping Tube Feeds today.  Activity: as tolerated.  Ppx: LVNX  Dispo: monitor pain control and possibly discharge this weekend vs Monday with home TPN.    Clinically Significant Risk Factors           # Hypocalcemia: Lowest Ca =  8.6 mg/dL in last 2 days, will monitor and replace as appropriate     # Hypoalbuminemia: Lowest albumin = 3.1 g/dL at 10/14/2024  7:34 AM, will monitor as appropriate                # Moderate Malnutrition: based on nutrition assessment    # Financial/Environmental Concerns: none           Patient seen and discussed with Dr. Esteban, who discussed with Dr. Branch.    Keith Hernandez MD  General Surgery PGY-1  Pager: [phone]

## 2024-10-25 NOTE — PROGRESS NOTES
"Care Management Follow Up    Length of Stay (days): 14    Expected Discharge Date: 10/28/2024     Concerns to be Addressed: discharge planning     Patient plan of care discussed at interdisciplinary rounds: Yes    Anticipated Discharge Disposition: Home, Home Care, Home Infusion              Anticipated Discharge Services: Home Care  Anticipated Discharge DME: None (Resume: TF, TPN, G + J tube NEW: Ostomy)    Patient/family educated on Medicare website which has current facility and service quality ratings: no  Education Provided on the Discharge Plan:    Patient/Family in Agreement with the Plan:      Referrals Placed by CM/SW: Internal Clinic Care Coordination, Homecare  Private pay costs discussed: Not applicable    Discussed  Partnership in Safe Discharge Planning  document with patient/family: No     Handoff Completed: No, handoff not indicated or clinically appropriate    Additional Information:  RNCC was notified by Susana patterson that patient would possibly be ready to discharge this weekend on TPN only.  RNCC reached out to [facility] liaison to inform her of this.  Makayla [facility] liasion states: \"Her hospital pharmacist will need to fax over her TPN recipe ideally by about noon and then final orders signed by about 2-3 if possible would be good.\"  \"I see she is not tolerating her TF and wants to just quit them.  So they can either put in an order to stop them OR they can adjust the order to say resume previous enteral orders per pt tolerance/preference.  That way if she wants to resume them once home we will already have the order.\"  RNCC passed this information along to providerSusana.  Liasion states that Yoselyn Sherwood is the resource nurse working this weekend and she will give her an update.      Update: ProviderSusana states that patient should be ready for discharge on Sunday.  Patient will be going home with TPN and not tube feeds.  Tube feeds later per pt preference/tolerance.     RNCC faxed TPN " recipe to [facility].  If this changes recipe will need to be faxed again.   Fax #[phone]    [facility] (RN cares for G tube, J tube, Ann) (intake RN aware of new ostomy)          Services Available   Home Health Services            Address   [address] [place] [address]             Contact Information    [phone]         [facility] (TF and TPN)  [phone]  [facility] lia[facility]   [phone]  Teams        Next Steps:   []Follow up with [facility] on discharge date and plan. Fax TPN recipe to [facility] if changes from today 10/25.   [] Internal hand off  Juan Jose Gomez, RN    Nurse Coordinator     Covering for: Sweta ALEX    Phone: [facility]    Social Work and Care Management Department    SEARCHABLE in [facility] - search CARE COORDINATOR    [place] ([phone]) Saturday & Sunday; ([phone]) [facility] Recognized Holidays    Units: 5A, 5B & 5C  Pager: [phone]    Units: 6B, 6C & 6D    Pager: [phone]    Units: 7A, 7B, 7C & 7D    Pager: [phone]    Units: 6A & ICU   Pager: [phone]    Units: 5 Ortho, 5MS & WB ED Pager: [phone]    Units: 6MS, 8A & 10 ICU  Pager [phone]

## 2024-10-25 NOTE — PLAN OF CARE
2513-2335    VSS on RA. A/Ox4, UAL. Patient reported increased abdominal pain and skin irritation following initiation of tube feedings. Provider notified and assessed patient at bedside. Provider recommend Lidocaine cream for skin irritation around J tube. Lidocaine cream with minimal relief. Abdominal pain relieved with scheduled Tylenol, PRN Flexeril and Oxy. Primary care team reassed patient this morning and ordered for tube feeds to be paused until further notice. J tube clamped. Intermittent nausea this morning relieved by IV Compazine. PRN Ativan given for anxiety. G tube clamped. Colostomy with good output. Midline incision intact, no drainage. Q4 BG checks. Full liquid diet, tolerating well. Cycled TPN and lipids infusing through CVC, red lumen. Purple lumen, infusing TKO. Continue to follow POC.     Goal Outcome Evaluation:    Plan of Care Reviewed With: Patient    Overall Patient Progress: no change

## 2024-10-25 NOTE — PROGRESS NOTES
"Brief Surgery Crossover Note    She reports pain at her J tube site but thinks it is not related to starting tube feeds.  She says the pain is at the surface of her skin near the J-tube insertion site.  She says she noticed leakage of green fluid when the pouch around her J-tube was removed which worsened her skin irritation.  She would like to continue tube feeds but is requesting medication for skin irritation around the J-tube insertion site, expresses that topical lidocaine followed by barrier cream has been effective for her in the past.     /67 (BP Location: Right arm)   Pulse 94   Temp 98.5  F (36.9  C) (Oral)   Resp 18   Ht 1.727 m (5' 8\")   Wt 61.1 kg (134 lb 9.6 oz)   SpO2 98%   BMI 20.47 kg/m      General: alert and oriented, in no acute distress  CV: regular rate and rhythm, palpable peripheral pulses, no edema   Resp: no increased work of breathing   GI: soft, non-distended, no focal tenderness, no guarding or rigidity, no leakage around J-tube insertion site covered in barrier cream, tube feeds 10 mL/hr, well-healing surgical incisions  Extremities: no significant pitting edema     -Apply topical lidocaine cream and barrier cream around J-tube insertion site, please page if patient continues to report pain and will consider holding tube feeds if indicated    Herminia Garza MD  General Surgery PGY-1    **For on call schedules and contact information, please refer to [facility]**      "

## 2024-10-25 NOTE — PROGRESS NOTES
"[facility]  WO Nurse Inpatient Assessment     Consulted for: Diverting loop ileostomy  10/18: new consult for J tube leakage    10/21 summary:  pain around J tube site likely mostly related to suture wound.  Improvement of seal around the tube with stabilization.  Anticipate that will be able to discontinue pouching within this week  10/24: Went to drop off ostomy resources and small mirror and pt showed WOC her abdomen and explained they are starting feeds thru the J-tube today so they removed the pouch. [facility] will adjust orders today and return 10/25 for ileostomy pouch change and to assess plan for j-tube    Patient History (according to provider note(s):      \"60 y/o female has a past medical history of Allergic rhinitis, cause unspecified, Allergy to other foods, Arthritis, Choledocholithiasis, Chronic abdominal pain, Chronic constipation, Chronic nausea, Chronic pancreatitis (H), Degeneration of lumbar or lumbosacral intervertebral disc, Esophageal reflux, Gastroparesis, Hiatal hernia, History of pituitary adenoma, Hypothyroidism, Migraines, Mild hyperlipidemia, On tube feeding diet, Pancreatic disease, PONV (postoperative nausea and vomiting), Portacath in place, Type 1 diabetes mellitus without complication (H) (5/9/2022), and Unspecified hearing loss.\"     Assessment:      Tube type and location:  J tube in LUQ    10/18  Last photo 10/25/24  History: placed in OR 10/11/24.  10/17/24 CT scan showed J tube in good position   Current Tube Securement: bumper sutured to skin   16 Fr tube with 1 lumens   Leakage around tube: small  Description of leakage/drainage:  bilious  Insertion site assessment:  approximately 0.1 cm gap between the tube and skin  Peritubular skin assessment:  bright red Erythema extending up to 0.3 cm from insertion site  Wound at 6 o'clock where the bumper had been sutured to the skin: 0.5 x 0.3 x 0.1 cm with pink dermal base     Palpation of " the wound bed: normal      Wound Drainage: none     Description of drainage: none  ?? Temperature? normal   Pain: mild, tender  Pain interventions prior to dressing change: patient tolerated well  STATUS: improving seal around the tube.  Pain likely related to drainage on the suture wound  Supplies ordered: gathered     Assessment of new end Ileostomy:  Diagnosis Pertinent to Stoma:  Slow transit constipation       Surgery Date: 10/11/24  Surgeon:[facility]       Hospital: [facility]  Pouching system in place on assessment today: Leandro two piece, cut to fit, flat, and barrier ring   Pouch barrier status: Minimal melting  Pouch last changed/wear time: 48 hours  Reason for pouch change today: routine schedule  Effectiveness of current pouching/ supply plan: Effective  Change made with ostomy management today: No  Pouching system in place after visit today: Leandro 2 piece flat cut to fit with barrier ring  (1 piece at bedside)  Supplies: gathered, at bedside, supplies stored on unit, and discussed with patient     Stoma location: RLQ  Stoma size: 1 1/4 inches  Stoma appearance: red, round, moist, edematous, and protruberant  Mucocutaneous junction:  intact  Peristomal complication(s): MARSI irritation   Output: watery and yellow  Output volume emptied during visit: 100 ml   Abdominal assessment: Firm  Surgical site(s): open to air and staples intact  NG still in place? No  Pain: Sharp  Is patient still on a PCA? No    Ostomy education assessment:  Participant of teaching session today: patient   Education completed today: Pouching system assessment , Pouch change return demonstration, Pouch emptying return demonstration, Importance of hydration, Low fiber diet , Odor/flatus management , and Infection prevention/hygiene   Educational materials/methods: Verbal, Written, FOD [place] education hand out, and Addressed patient/caregiver questions/concerns  Education still needed: Discharge instructions  Learning  "Comprehension:   Psychosocial assessment: is a retired  and has had to manage TPN, drains at home with home care assist   Patient readiness for education today: attentive and active participation  Following visit: patient  is able to demonstrate;         1. How to empty their pouch? Yes and Independent        2. How to change their pouch? Yes and with minimal assist        3. How to read and record intake and output correctly? Yes and Demo provided   Preparation for discharge completed: Placed prescription recommendations in discharge navigator for MD to sign, Ensured patient has extra supplies for discharge, Discussed how to order supplies after discharge , Ordered samples from  after gaining consent from patient/caregiver, Discussed how and when to make an outpatient WO nurse appointment after discharge, Prepared for discharge home with home care, and Discuss signs/symptoms of when to seek medical attention  Preparation for discharge still needed: Complete  Pt support system on discharge: spouse  WOC recommend home care? Yes  Face to face time: 45 minutes    Treatment Plan:     J tube site:    Pouching system:  2 piece flat 57 mm barrier with urostomy pouch  -cuauhtemoc barrier ring under the barrier.   J tube coming out pouch.      Change if leaks using the same supplies but cut the barrier to the size of the bumper.  If remains intact, to be changed by WO on MWF    RLQ Ileostomy pouching plan:   Pouching system: ostomy supplies pouches: Leandro 57 FECAL (997059) ostomy supplies barrier: Leandro 57mm FLAT (236219)  Accessories used: WO ostomy accessories: 2\" Cera Barrier Ring ([id number])   Frequency of pouch changes: PRN leakage and Three times a week  WOC follow up plan: Twice weekly  Bedside RN interventions: Change pouch PRN if leaking using the supplies above, Empty pouch when 1/3 to 1/2 full, ensure to clean pouch outlet after emptying to prevent odor, Notify WOC for ongoing pouch " leakage, Document stoma appearance and output volume, color, and consistency every shift, and Encourage patient to empty pouch with assist     Outpatient plan:  Request the following supplies:    Ostomy:  Leandro    2 piece flat wafer 57mm  # 64300                           2 piece opaque Fecal pouch without Filter 57mm- # 40883  Accessories  2  barrier ring #8805     Adapt powder #7906    No sting film barrier # 3345                           Adapt universal adhesive remover #0029                          Adapt M-9 Spray room deodorizer #6565                           Leandro barrier extenders #59691                                 Change your pouch 2 times a week, more often if leaking.    Gtube:   [place]    2 piece flat wafer 57mm  # 57955   Urine pouch 57mm- # 49405  Accessories      Eliseo paste #[id number]    Adapt powder #7906    No sting film barrier # 3345    Change 2-3x a week     DATA:     Current support surface: Standard  Standard gel mattress (Isoflex)  Containment of urine/stool: Ileostomy pouch  BMI: Body mass index is 20.47 kg/m .   Active diet order: Orders Placed This Encounter      Regular Diet Adult      Diet     Output: I/O last 3 completed shifts:  In: 80   Out: 2225 [Urine:400; Emesis/NG output:1400; Stool:425]     Labs:   Recent Labs   Lab 10/25/24  0530 10/24/24  0523 10/22/24  0416 10/21/24  0621   ALBUMIN  --  3.4*  --  3.3*   PREALB  --   --   --  17.0*   HGB 8.4* 9.5*   < > 8.6*   INR  --   --   --  1.12   WBC 12.5* 12.8*   < > 19.1*    < > = values in this interval not displayed.     Pressure injury risk assessment:   Sensory Perception: 4-->no impairment  Moisture: 4-->rarely moist  Activity: 3-->walks occasionally  Mobility: 3-->slightly limited  Nutrition: 2-->probably inadequate  Friction and Shear: 3-->no apparent problem  Martinez Score: 19    Kenya Caban RN CWOCN  Pager no longer is use, please contact through [employer] group: [facility] [place]  Dept. Office  Number: [phone]

## 2024-10-25 NOTE — PLAN OF CARE
Physical Therapy Discharge Summary    Reason for therapy discharge:    Discharged to home.    Progress towards therapy goal(s). See goals on Care Plan in [facility] electronic health record for goal details.  Goals met    Therapy recommendation(s):    Continued therapy is recommended.  Rationale/Recommendations:  Pt safe for home discharge with PRN assist from family as needed. Pt may benefit from OP PT in order to return to higher level activities and improve overall activity tolerance .

## 2024-10-25 NOTE — PROVIDER NOTIFICATION
"Provider Herminia Garza notified at 2005    \"[address] Patient complaining of significant pain since starting tube feeds. Patient requesting provider to come to bedside to discuss possibly stopping feeds. Could you please come visualize? Vanessa Leahy RN\"    Provider aware and coming to beside to assess patient.  "

## 2024-10-25 NOTE — PROGRESS NOTES
CLINICAL NUTRITION SERVICES - REASSESSMENT NOTE     Nutrition Prescription    RECOMMENDATIONS FOR MDs/PROVIDERS TO ORDER:  FWF adjustments per team.    Malnutrition Status:    Moderate malnutrition in the context of chronic illness.    Recommendations already ordered by Registered Dietitian (RD):  Continue cycled TPN as ordered:  TPN (cycled for 12 hours overnight from 8pm-8am): 1440 mL/day (120 mL/hr) with 200 g dextrose, 100 g AA, and 250 mL 20% IV SMOFlipid daily to provide 1580 kcal (25 kcal/kg), 1.6 g/kg PRO, GIR 4.6, and 32% kcal from fat per dosing weight     Discontinued Relizorb cartridge supply order and patient care order for Relizorb instructions as pt no longer on TF    Future/Additional Recommendations:  Monitor ongoing TPN, lytes, GI/stooling, weights.  Monitor ability to resume TF.     EVALUATION OF THE PROGRESS TOWARD GOALS   Diet: Regular (10/24-current), Full liquid (10/22), Clear liquid (10/22), NPO (10/22), Clear liquid (10/21), NPO (10/21), Regular (10/18-10/21)  Intake: Pt consumed 0%, 75%, and 100% of 3 meals this week per flowsheet. Pt ordering 0-2 meals a day per [facility].    Nutrition Support:  TF: Trickle feeds via pt's J-tube were initiated yesterday, but discontinued this morning d/t pt not tolerating and reporting pain at J-tube site. Prior to this, pt last received trickle TF from 10/15-10/16. Pt has done cycled TF at home.  10/24 - 10/25: [employer] Peptide 1.5 @ 10 mL/hr via J-tube + Relizorb  Intake: TF did not provide a significant amount of kcals/protein this week    TPN: Currently on cycled TPN. Previously did supplemental TPN 3 days a week at home.  10/19- current: TPN (cycled for 12 hours overnight from 8pm-8am): 1440 mL/day (120 mL/hr) with 200 g dextrose, 100 g AA, and 250 mL 20% IV SMOFlipid daily to provide 1580 kcal (25 kcal/kg), 1.6 g/kg PRO, GIR 4.6, and 32% kcal from fat per dosing weight   Intake: TPN meeting 100% of kcal and protein needs.     NEW FINDINGS   Pt  sound asleep at time of visit. Information obtained from chart review.    General: per rounds, will now plan to just continue with full TPN (no TF as pt didn't tolerate) and pt can restart her TF in outpatient setting    Weight:  Last wt (10/24): 61.1 kg  Wt stable during admit  No significant wt loss PTA    Labs:  10/24 alk phos 260 (H). ALT 55 (H).    Medications:  Pepcid  Novolog - medium insulin resistance  Lantus  Levothyroxine  Creon 24 3-4 capsules TID with meals and with snacks as needed  Zosyn  Vancomycin  PRN compazine    GI:  Ileostomy in place. Output between 200-1950 mL/day  GJ tube, G for decompression, J for feeds    Skin: WOC following (last assessed 10/24) for J tube site wound and new ileostomy    Respiratory: RA    Endo: DM1, endo following, chronic pancreatitis, s/p pancreatectomy with auto islet transplant in 2016    MALNUTRITION  % Intake: No decreased intake noted  % Weight Loss: None noted  Subcutaneous Fat Loss: Facial region and Lower arm:  mild per visual  Muscle Loss: Temporal and Facial & jaw region:  mild per visual  Fluid Accumulation/Edema: Does not meet criteria  Malnutrition Diagnosis: Moderate malnutrition in the context of chronic illness.    Previous Goals   Total avg nutritional intake to meet a minimum of 25 kcal/kg and 1.2 g PRO/kg daily (per dosing wt 61 kg).  Evaluation: Met    Previous Nutrition Diagnosis  Inadequate oral intake related to on diet for comfort with G-tube to gravity as evidenced by on TPN for nutrition support while on diet for comfort with G-tube to gravity    Evaluation: No change    CURRENT NUTRITION DIAGNOSIS  Inadequate oral intake related to on diet for comfort with G-tube to gravity as evidenced by on TPN for nutrition support while on diet for comfort with G-tube to gravity      INTERVENTIONS  Implementation  Parenteral Nutrition/IV Fluids - continue cycled TPN    Goals  Total avg nutritional intake to meet a minimum of 25 kcal/kg and 1.2 g PRO/kg  "daily (per dosing wt 61 kg).    Monitoring/Evaluation  Progress toward goals will be monitored and evaluated per protocol.    Epi Ching, RD, LD  Available on [employer]  Weekend/Holiday RD [facility] - \"Weekend Clinical Dietitian\"  No longer available by paging   "

## 2024-10-25 NOTE — PROGRESS NOTES
Pt called to C/O pain and anxiety 1 mg Lorazepam PO given for anxiety and 50 mg Hydroxyzine PO given for pain. Will tell oncoming RN to follow up with patient and intervene as needed.

## 2024-10-25 NOTE — PROGRESS NOTES
Inpatient Diabetes Management Service: Daily Progress Note     HPI: Dinora Mcghee is a 59 year old with pancreatogenic diabetes s/p pancreatectomy and TPAIT in 2016,complicated by gastroparesis, as well as a comorbid history of HTN,HLD, Hypothyroidism, chronic nausea/and constipation, chronic malnutrition and TPN and Tube feeds dependent , who was admitted on 10/11/2024 for exploratory laparotomy, extensive lysis of adhesions, revision of J tube,and small bowel resection. Inpatient Diabetes Service consulted for management of diabetes in the setting of continuous TPN use and expected TF use in future. G and J tube on gravity          Assessment/Plan:     Assessment:   Pancreatogenic diabetes s/p pancreatectomy and TPAIT in 2016, off insulin approx 4 years, currently req insulin and c/b  gastroparesis.  A1C-5.4% 7/23/2024  S/p ex lap, extensive lysis of adhesions, revision of J tube,and small bowel resection on 10/11/24  Chronic malnutrition: TPN cycled, TF resuming at trickle 10/24  Abx for postoperative infection/Jtube leakage and cellulitis   BMI:  20    Plan/Recommendations:  - Regular insulin 22 units with cycled TPN dextrose at 200 g (at goal) = 0.11 units/gram dextrose.   - Lantus 4 units q 24 hrs at 1600   - Novolog carb coverage: 1 unit per 15 g cho TID AC and 1/20 prn snacks/supplements  - Novolog Correction Scale: adjust timing  - 1:50 >140 at 08:00, 12:00 pm and 18:00 (TPN off)  - 1:35 >140 at bedtime and 0200 (during TPN run)  - BG monitoring: Every 4 hours --> TID AC, HS, 0200  - Hypoglycemia protocol    - Carb counting protocol    Discussion: Started trickle TF yesterday, paused overnight 2/2 pain at site of J-tube. No plan to resume TF. Spoke with primary team, tentatively discharging Sunday with cyclic TPN and diet. Spoke with pharmacist to finalize regular insulin dose in TPN (22 unit). Patient requests less frequent BG checks --> since eating better, will change to be with  "meals rather than q4hr.     Inpatient Diabetes Management Team Discharge Instructions: (TENTATIVE)     Supplies and insulin to be ordered by Primary team:   Will need lantus pen as was on Levemir before- they will no longer be manufactoring Levemir after 12/2024.  Novolog pen  Yandy 3     Blood Glucose Checks: Three times daily before meals, and at bedtime. Has yandy 3 CGM. Please call if you have more than 2 blood sugar over 250 in one day, or any sugars less than 75.      Glucose Control Regimen:      Lantus insulin: 4 units every day at 4 pm     Regular insulin 22 units in TPN cycled over 12 hours with dextrose 200 g      Novolog insulin to cover carbohydrates with meals: ( \"fixed meal dose\":  2 units with each meal.  Do not give if do not eat meal ) OR If you want to try counting carbs again 1 unit of insulin for 15g of carb.     Novolog insulin correction scale: three times daily before meals and at bedtime (has Yandy)  Give correction insulin based on the following scale: *Give even if skipping a meal* at 8AM, noon, 5PM, bedtime     Do Not give Correction Insulin if BG less than 140  Do not give more frequently then every 4 hours or you will stack insulin and risk going low  Blood Glucose Before Meals, Add   Less than 140 0 units   140 -189  1 units   190 - 239 2 units   240 - 289 3 units   290 - 339 4 units    340 - 389  5 units   390 - 439 6 units   >/= 440 7 units         Endocrinology Outpatient follow up:    Patient should schedule your outpatient diabetes appointment 1-2 weeks from discharge with current provider (Dr. Melissa)      If you have urgent questions or concerns regarding your blood sugars or insulin, you may contact [phone] (the main hospital ). Ask to speak with the endocrinologist on call.      Please notify Inpatient Diabetes Service if changes are planned to steroids, nutrition, TPN/TF and anticipated procedures requiring prolonged NPO status.         Interval History/Review of " Systems :   - The last 24 hours progress and nursing notes reviewed.  - Feeling bad this morning following start of trickle TF. Severe pain around J tube site. Having nausea, no emesis.     Planned Procedures/Surgeries:  none    Inpatient Glucose Control:          Recent Labs   Lab 10/25/24  0531 10/25/24  0457 10/25/24  0020 10/24/24  2031 10/24/24  1559 10/24/24  1113   * 124* 157* 156* 136* 94             Medications Impacting Glycemia:   Steroids: None  D5W containing solutions/medications: None  Other medications impacting glucose: None         Nutrition:   Orders Placed This Encounter      Regular Diet Adult      Diet  Prior to admission: was on TPN on Sunday, Tuesday and Thursday nights.  Also on enteral feeds  Supplements: none   TF: NONE. Hx:   10/15/2024:  [employer] Peptide 1.5 (plain) @ 10 mL/hr via J-tube (only ran PM 10/15- 6AM 10/16).   Re-tired TF 10/24:  Re-starting [employer] Peptide 1.5 @ 10 ml/hr - continuous (1.4 g cho per hour or 33 g cho in 24 hours) -> discontinued AM 10/25  TPN:  started cycle over 12 hours with same dextrose 200g at 8 pm at 10/19/2024... current.  Continuous: dextrose 140 g (10/13) -->  dextrose 170 g (10/14-10/15) --> dextrose increased to 200 g (10/17 - 10/18)         Diabetes History: see full consult note for complete diabetes history   Diabetes Type and Duration: Since 2016, s/p pancreatectomy and TPAIT     PTA Medication Regimen: Takes insulin levemir 3 units daily and novolog 1 unit if BG is 175-275, and 2 units if >275 up to every 4 hours as needed. Reports some times she keeps on  giving correction whole night but other times does it once or not require it all.  Historical Diabetes Medications: As above     Glucose monitoring device and frequency: Checks with jim 3  Outpatient Diabetes Provider: Dr. Melissa (last visit 4/2024)       Physical Exam:   /65 (BP Location: Left arm)   Pulse 97   Temp 97.8  F (36.6  C) (Oral)   Resp 17   Ht 1.727 m (5'  "8\")   Wt 61.1 kg (134 lb 9.6 oz)   SpO2 97%   BMI 20.47 kg/m    General:  ill appearing, NAD, resting in bed. Intermittently wincing in pain.   Lungs: unlabored breathing on RA.  Mental Status:  Alert and oriented x3  Psych:  calm, cooperative, normal mood and affect          Data:     Lab Results   Component Value Date    A1C 5.4 07/23/2024    A1C 5.6 03/20/2024    A1C 5.4 02/17/2022    A1C 5.2 11/27/2021    A1C 5.5 09/18/2021    A1C 5.4 08/05/2021    A1C 5.7 05/12/2021    A1C 5.9 (A) 04/01/2021    A1C 5.5 12/17/2020    A1C 5.8 (H) 07/30/2020       ROUTINE IP LABS (Last four results)  BMP  Recent Labs   Lab 10/25/24  0531 10/25/24  0457 10/25/24  0020 10/24/24  2031 10/24/24  0750 10/24/24  0523 10/23/24  1151 10/23/24  0916 10/22/24  0418 10/22/24  0416   NA  --  138  --   --   --  137  --  136  --  137   POTASSIUM  --  4.3  --   --   --  3.8  --  4.5  --  4.3   CHLORIDE  --  104  --   --   --  103  --  101  --  103   KASSI  --  8.5*  --   --   --  8.6*  --  9.1  --  8.6*   CO2  --  24  --   --   --  25  --  23  --  22   BUN  --  17.5  --   --   --  18.5  --  22.4*  --  19.3   CR  --  0.59  --   --   --  0.57  --  0.57  --  0.58   * 124* 157* 156*   < > 101*   < > 197*   < > 168*    < > = values in this interval not displayed.     CBC  Recent Labs   Lab 10/25/24  0530 10/24/24  0523 10/23/24  0527 10/22/24  0416   WBC 12.5* 12.8* 18.2* 19.4*   RBC 2.78* 3.19* 2.89* 2.80*   HGB 8.4* 9.5* 9.0* 8.2*   HCT 27.0* 30.6* 28.6* 26.9*   MCV 97 96 99 96   MCH 30.2 29.8 31.1 29.3   MCHC 31.1* 31.0* 31.5 30.5*   RDW 13.8 13.4 13.6 13.3   PLT 1,150* 990* 1,119* 955*     Inpatient Diabetes Service will continue to follow, please don't hesitate to contact the team with any questions or concerns.     Homa Valencia PA-C  Inpatient Diabetes Service  Pager: [phone]  Available on [facility]    To contact Inpatient Diabetes Service:     7 AM - 5 PM: Page the IDS GIGI following the patient that day (see filed or incomplete progress " notes/consult notes under Endocrinology)    OR if uncertain of provider assignment: page job code [phone]    5 PM - 7 AM: First call after hours is to primary service.    For urgent after-hours questions, page job code for on call fellow: [phone]     I spent a total of 35 minutes on the date of the encounter doing prep/post-work, chart review, history and exam, documentation and further activities per the note including lab review, multidisciplinary communication, counseling the patient and/or coordinating care regarding acute hyper/hypoglycemic management, as well as discharge management and planning/communication.  See note for details.      Please notify inpatient diabetes service if changes are planned to steroids, nutrition, or if procedures are planned requiring prolonged NPO status.Diabetes Management Team job code: [phone]

## 2024-10-26 ENCOUNTER — HOME INFUSION (OUTPATIENT)
Dept: HOME HEALTH SERVICES | Facility: HOME HEALTH | Age: 59
End: 2024-10-26
Payer: COMMERCIAL

## 2024-10-26 DIAGNOSIS — E43 UNSPECIFIED SEVERE PROTEIN-CALORIE MALNUTRITION (H): Primary | ICD-10-CM

## 2024-10-26 DIAGNOSIS — K31.84 GASTROPARESIS: ICD-10-CM

## 2024-10-26 LAB
CREAT SERPL-MCNC: 0.53 MG/DL (ref 0.51–0.95)
EGFRCR SERPLBLD CKD-EPI 2021: >90 ML/MIN/1.73M2
ERYTHROCYTE [DISTWIDTH] IN BLOOD BY AUTOMATED COUNT: 13.7 % (ref 10–15)
GLUCOSE BLDC GLUCOMTR-MCNC: 118 MG/DL (ref 70–99)
GLUCOSE BLDC GLUCOMTR-MCNC: 120 MG/DL (ref 70–99)
GLUCOSE BLDC GLUCOMTR-MCNC: 141 MG/DL (ref 70–99)
GLUCOSE BLDC GLUCOMTR-MCNC: 142 MG/DL (ref 70–99)
HCT VFR BLD AUTO: 27.6 % (ref 35–47)
HGB BLD-MCNC: 8.5 G/DL (ref 11.7–15.7)
MAGNESIUM SERPL-MCNC: 2.1 MG/DL (ref 1.7–2.3)
MCH RBC QN AUTO: 29.5 PG (ref 26.5–33)
MCHC RBC AUTO-ENTMCNC: 30.8 G/DL (ref 31.5–36.5)
MCV RBC AUTO: 96 FL (ref 78–100)
PHOSPHATE SERPL-MCNC: 3.8 MG/DL (ref 2.5–4.5)
PLATELET # BLD AUTO: 1191 10E3/UL (ref 150–450)
POTASSIUM SERPL-SCNC: 4.1 MMOL/L (ref 3.4–5.3)
RBC # BLD AUTO: 2.88 10E6/UL (ref 3.8–5.2)
WBC # BLD AUTO: 11.9 10E3/UL (ref 4–11)

## 2024-10-26 PROCEDURE — 120N000002 HC R&B MED SURG/OB UMMC

## 2024-10-26 PROCEDURE — 250N000013 HC RX MED GY IP 250 OP 250 PS 637

## 2024-10-26 PROCEDURE — 258N000003 HC RX IP 258 OP 636: Performed by: SURGERY

## 2024-10-26 PROCEDURE — 84100 ASSAY OF PHOSPHORUS: CPT | Performed by: COLON & RECTAL SURGERY

## 2024-10-26 PROCEDURE — 250N000013 HC RX MED GY IP 250 OP 250 PS 637: Performed by: SURGERY

## 2024-10-26 PROCEDURE — 250N000009 HC RX 250: Performed by: COLON & RECTAL SURGERY

## 2024-10-26 PROCEDURE — 250N000013 HC RX MED GY IP 250 OP 250 PS 637: Performed by: PHYSICIAN ASSISTANT

## 2024-10-26 PROCEDURE — 250N000011 HC RX IP 250 OP 636

## 2024-10-26 PROCEDURE — 83735 ASSAY OF MAGNESIUM: CPT | Performed by: COLON & RECTAL SURGERY

## 2024-10-26 PROCEDURE — 250N000011 HC RX IP 250 OP 636: Performed by: SURGERY

## 2024-10-26 PROCEDURE — 84132 ASSAY OF SERUM POTASSIUM: CPT | Performed by: COLON & RECTAL SURGERY

## 2024-10-26 PROCEDURE — 250N000013 HC RX MED GY IP 250 OP 250 PS 637: Performed by: STUDENT IN AN ORGANIZED HEALTH CARE EDUCATION/TRAINING PROGRAM

## 2024-10-26 PROCEDURE — 85027 COMPLETE CBC AUTOMATED: CPT

## 2024-10-26 PROCEDURE — 99232 SBSQ HOSP IP/OBS MODERATE 35: CPT | Mod: 24 | Performed by: STUDENT IN AN ORGANIZED HEALTH CARE EDUCATION/TRAINING PROGRAM

## 2024-10-26 PROCEDURE — 250N000009 HC RX 250: Performed by: STUDENT IN AN ORGANIZED HEALTH CARE EDUCATION/TRAINING PROGRAM

## 2024-10-26 PROCEDURE — B4185 PARENTERAL SOL 10 GM LIPIDS: HCPCS | Performed by: COLON & RECTAL SURGERY

## 2024-10-26 PROCEDURE — 250N000009 HC RX 250

## 2024-10-26 PROCEDURE — 250N000013 HC RX MED GY IP 250 OP 250 PS 637: Performed by: COLON & RECTAL SURGERY

## 2024-10-26 PROCEDURE — 82565 ASSAY OF CREATININE: CPT | Performed by: COLON & RECTAL SURGERY

## 2024-10-26 RX ORDER — MICONAZOLE NITRATE 2 %
1 CREAM WITH APPLICATOR VAGINAL AT BEDTIME
Status: DISCONTINUED | OUTPATIENT
Start: 2024-10-26 | End: 2024-10-28 | Stop reason: HOSPADM

## 2024-10-26 RX ORDER — INSULIN ASPART 100 [IU]/ML
INJECTION, SOLUTION INTRAVENOUS; SUBCUTANEOUS
Qty: 15 ML | Refills: 5 | Status: SHIPPED | OUTPATIENT
Start: 2024-10-26

## 2024-10-26 RX ORDER — HYDROMORPHONE HCL IN WATER/PF 6 MG/30 ML
0.4 PATIENT CONTROLLED ANALGESIA SYRINGE INTRAVENOUS ONCE
Status: COMPLETED | OUTPATIENT
Start: 2024-10-26 | End: 2024-10-26

## 2024-10-26 RX ADMIN — OXYCODONE HYDROCHLORIDE 10 MG: 5 SOLUTION ORAL at 03:08

## 2024-10-26 RX ADMIN — PROMETHAZINE HYDROCHLORIDE 25 MG: 25 INJECTION, SOLUTION INTRAMUSCULAR; INTRAVENOUS at 08:23

## 2024-10-26 RX ADMIN — FAMOTIDINE 20 MG: 40 POWDER, FOR SUSPENSION ORAL at 22:39

## 2024-10-26 RX ADMIN — ACETAMINOPHEN 975 MG: 325 SOLUTION ORAL at 18:10

## 2024-10-26 RX ADMIN — FAMOTIDINE 20 MG: 40 POWDER, FOR SUSPENSION ORAL at 08:23

## 2024-10-26 RX ADMIN — PROMETHAZINE HYDROCHLORIDE 25 MG: 25 INJECTION, SOLUTION INTRAMUSCULAR; INTRAVENOUS at 23:04

## 2024-10-26 RX ADMIN — AMOXICILLIN AND CLAVULANATE POTASSIUM 1 TABLET: 875; 125 TABLET, FILM COATED ORAL at 22:40

## 2024-10-26 RX ADMIN — PROCHLORPERAZINE EDISYLATE 10 MG: 5 INJECTION INTRAMUSCULAR; INTRAVENOUS at 00:19

## 2024-10-26 RX ADMIN — HYDROXYZINE HYDROCHLORIDE 50 MG: 25 TABLET, FILM COATED ORAL at 22:48

## 2024-10-26 RX ADMIN — LIDOCAINE 4% 1 PATCH: 40 PATCH TOPICAL at 05:38

## 2024-10-26 RX ADMIN — HYDROXYZINE HYDROCHLORIDE 50 MG: 25 TABLET, FILM COATED ORAL at 15:21

## 2024-10-26 RX ADMIN — LEVOTHYROXINE SODIUM 150 MCG: 75 TABLET ORAL at 08:24

## 2024-10-26 RX ADMIN — ACETAMINOPHEN 975 MG: 325 SOLUTION ORAL at 12:47

## 2024-10-26 RX ADMIN — PANCRELIPASE 3 CAPSULE: 120000; 24000; 76000 CAPSULE, DELAYED RELEASE PELLETS ORAL at 14:26

## 2024-10-26 RX ADMIN — OXYCODONE HYDROCHLORIDE 10 MG: 5 SOLUTION ORAL at 12:46

## 2024-10-26 RX ADMIN — SMOFLIPID 250 ML: 6; 6; 5; 3 INJECTION, EMULSION INTRAVENOUS at 21:15

## 2024-10-26 RX ADMIN — AMOXICILLIN AND CLAVULANATE POTASSIUM 1 TABLET: 875; 125 TABLET, FILM COATED ORAL at 08:24

## 2024-10-26 RX ADMIN — ENOXAPARIN SODIUM 40 MG: 40 INJECTION SUBCUTANEOUS at 15:22

## 2024-10-26 RX ADMIN — HYDROMORPHONE HYDROCHLORIDE 0.4 MG: 0.2 INJECTION, SOLUTION INTRAMUSCULAR; INTRAVENOUS; SUBCUTANEOUS at 21:07

## 2024-10-26 RX ADMIN — PROMETHAZINE HYDROCHLORIDE 25 MG: 25 INJECTION, SOLUTION INTRAMUSCULAR; INTRAVENOUS at 16:29

## 2024-10-26 RX ADMIN — ACETAMINOPHEN 975 MG: 325 SOLUTION ORAL at 00:06

## 2024-10-26 RX ADMIN — CYCLOBENZAPRINE HYDROCHLORIDE 10 MG: 5 TABLET, FILM COATED ORAL at 00:06

## 2024-10-26 RX ADMIN — OXYCODONE HYDROCHLORIDE 10 MG: 5 SOLUTION ORAL at 22:40

## 2024-10-26 RX ADMIN — Medication 25 MG: at 22:40

## 2024-10-26 RX ADMIN — Medication 60 MG: at 22:39

## 2024-10-26 RX ADMIN — PANCRELIPASE 3 CAPSULE: 120000; 24000; 76000 CAPSULE, DELAYED RELEASE PELLETS ORAL at 18:11

## 2024-10-26 RX ADMIN — BACLOFEN 20 MG: 10 TABLET ORAL at 08:24

## 2024-10-26 RX ADMIN — PANCRELIPASE 3 CAPSULE: 120000; 24000; 76000 CAPSULE, DELAYED RELEASE PELLETS ORAL at 09:30

## 2024-10-26 RX ADMIN — OXYCODONE HYDROCHLORIDE 10 MG: 5 SOLUTION ORAL at 08:24

## 2024-10-26 RX ADMIN — BACLOFEN 20 MG: 10 TABLET ORAL at 15:21

## 2024-10-26 RX ADMIN — MAGNESIUM SULFATE HEPTAHYDRATE: 500 INJECTION, SOLUTION INTRAMUSCULAR; INTRAVENOUS at 21:11

## 2024-10-26 RX ADMIN — CYCLOBENZAPRINE HYDROCHLORIDE 10 MG: 5 TABLET, FILM COATED ORAL at 18:50

## 2024-10-26 RX ADMIN — BACLOFEN 20 MG: 10 TABLET ORAL at 22:40

## 2024-10-26 RX ADMIN — HEPARIN, PORCINE (PF) 10 UNIT/ML INTRAVENOUS SYRINGE 10 ML: at 11:06

## 2024-10-26 RX ADMIN — Medication 50 MG: at 22:41

## 2024-10-26 RX ADMIN — Medication 5 ML: at 05:36

## 2024-10-26 RX ADMIN — ACETAMINOPHEN 975 MG: 325 SOLUTION ORAL at 05:38

## 2024-10-26 RX ADMIN — CYCLOBENZAPRINE HYDROCHLORIDE 10 MG: 5 TABLET, FILM COATED ORAL at 11:05

## 2024-10-26 NOTE — PROGRESS NOTES
Colorectal Surgery Progress Note  [facility]  POD#15      Subjective:  Continue pain around J tube site, but improve now, still tolerating diet.    Vitals:  Vitals:    10/25/24 1300 10/25/24 1651 10/25/24 1947 10/26/24 0421   BP: 106/71 107/61 106/66 100/63   BP Location: Left arm Right arm Right arm Right arm   Pulse: 102 100 100 107   Resp: 17 18 18 18   Temp: 98.1  F (36.7  C) 98.3  F (36.8  C) 98.2  F (36.8  C) 98.5  F (36.9  C)   TempSrc: Oral Oral Oral Oral   SpO2: 98% 98% 99% 96%   Weight:       Height:         I/O:  I/O last 3 completed shifts:  In: 185 [P.O.:100; NG/GT:85]  Out: 3050 [Urine:1625; Emesis/NG output:900; Stool:525]    Physical Exam:  Gen: AAOx3, NAD  Pulm: Non-labored breathing  Abd: Soft, non-distended, tender around J-tube site, no guarding   Incision C/D/I with staples in place   Stoma pink and viable with soft/liquid stool and gas in bag   G tube in place.               J tube in place, with dressing around site, no leakage.  Ext:  Warm and well-perfused    BMP  Recent Labs   Lab 10/26/24  0536 10/26/24  0312 10/25/24  2236 10/25/24  1736 10/25/24  1322 10/25/24  0531 10/25/24  0457 10/24/24  0750 10/24/24  0523 10/23/24  1151 10/23/24  0916 10/23/24  0857 10/23/24  0527 10/22/24  0418 10/22/24  0416   NA  --   --   --   --   --   --  138  --  137  --  136  --   --   --  137   POTASSIUM  --   --   --   --   --   --  4.3  --  3.8  --  4.5  --   --   --  4.3   CHLORIDE  --   --   --   --   --   --  104  --  103  --  101  --   --   --  103   CO2  --   --   --   --   --   --  24  --  25  --  23  --   --   --  22   BUN  --   --   --   --   --   --  17.5  --  18.5  --  22.4*  --   --   --  19.3   CR 0.53  --   --   --   --   --  0.59  --  0.57  --  0.57  --   --   --  0.58   GLC  --  142* 141* 143* 151*   < > 124*   < > 101*   < > 197*   < >  --    < > 168*   MAG  --   --   --   --   --   --  2.2  --  2.1  --  2.3  --  2.7*  --  2.3   PHOS  --   --   --   --   --    --  3.9  --  4.2  --  4.3  --  7.9*  --  3.8    < > = values in this interval not displayed.     CBC  Recent Labs   Lab 10/25/24  0530 10/24/24  0523 10/23/24  0527 10/22/24  0416   WBC 12.5* 12.8* 18.2* 19.4*   HGB 8.4* 9.5* 9.0* 8.2*   HCT 27.0* 30.6* 28.6* 26.9*   PLT 1,150* 990* 1,119* 955*         ASSESSMENT: 59 year old female PMH DM type 1, chronic pancreatitis, gastroparesis, s/p prior pancreatectomy with auto islet transplant in 2016, chronic abdominal pain on buprenorphine, GJ tube, J for feeds and G for decompression and needs to vent 75% of the time, thrice weekly TPN on Sun-Tues-Thurs for slow transit constipation and gastroparesis. Now SP ex lap, BAUDILIO, SBR, revision of J tube and TAC with ileorectal anastomosis and DLI on 10/11.     - J-tube to gravity with appliance bag around it to prevent leakage.  - Continue TPN  - Continue regular diet for comfort  - Augmentin PO.  -  Vent G tube.  -Plan for discharge home tomorrow.      Mehul Bolaños MD  Division of Colon and Rectal Surgery  [facility]  [phone]

## 2024-10-26 NOTE — PLAN OF CARE
"/63 (BP Location: Right arm)   Pulse 107   Temp 98.5  F (36.9  C) (Oral)   Resp 18   Ht 1.727 m (5' 8\")   Wt 61.1 kg (134 lb 9.6 oz)   SpO2 96%   BMI 20.47 kg/m       Goal Outcome Evaluation:  [id number]      Plan of Care Reviewed With: patient    Overall Patient Progress: no changeOverall Patient Progress: no change     VSS, on RA. A&Ox4. Reg diet. Ind. Colostomy, good output, self-managed. Pain tolerated w/ PRN Oxy, Flexeril, & scheduled Tylenol. Voiding spont, AUO. Reported nausea, tolerated w/ Compazine, reported relief. G tube clamped bw meds. J tube clamped. CVC infusing TPN + lipids. Cont POC.       "

## 2024-10-26 NOTE — PROGRESS NOTES
Inpatient Diabetes Management Service: Daily Progress Note     HPI: Dinora Mcghee is a 59 year old with pancreatogenic diabetes s/p pancreatectomy and TPAIT in 2016,complicated by gastroparesis, as well as a comorbid history of HTN,HLD, Hypothyroidism, chronic nausea/and constipation, chronic malnutrition and TPN and Tube feeds dependent , who was admitted on 10/11/2024 for exploratory laparotomy, extensive lysis of adhesions, revision of J tube,and small bowel resection. Inpatient Diabetes Service consulted for management of diabetes in the setting of continuous TPN use and expected TF use in future. G and J tube on gravity          Assessment/Plan:     Assessment:   Pancreatogenic diabetes s/p pancreatectomy and TPAIT in 2016, off insulin approx 4 years, currently req insulin and c/b  gastroparesis.  A1C-5.4% 7/23/2024  S/p ex lap, extensive lysis of adhesions, revision of J tube,and small bowel resection on 10/11/24  Chronic malnutrition: TPN cycled, TF [facility]uming [facility] 10/24  Abx for postoperative infection/Jtube leakage and cellulitis   BMI:  20    Plan/Recommendations:    - Regular insulin 22 units with cycled TPN dextrose at 200 g (at goal) = 0.11 units/gram dextrose.   - Lantus 4 units q 24 hrs at 1600 --> move to 1800 today then 2000 10/27 (patient requests 2000 dosing)  - Novolog carb coverage: 1 unit per 15 g cho TID AC and 1/20 prn snacks/supplements  - Novolog Correction Scale:  - 1:50 >140 at 08:00, 12:00 pm and 18:00 (TPN off)  - 1:50 >140 at bedtime and 0200 (during TPN run) --> relaxed from 1:35  - BG monitoring: TID AC, HS, 0200  - Hypoglycemia protocol    - Carb counting protocol    Discussion: Overall BG trending well. Only change today is relaxing overnight correction scale and adjusting Lantus timing back by 2 hrs with plan to give at 8PM at home. Per primary team, discharging home tomorrow. Reviewed tentative discharge plan below with patient and verified that doses  "are being prescribed correctly by primary team for discharge.     Inpatient Diabetes Management Team Discharge Instructions: (TENTATIVE)     Supplies and insulin to be ordered by Primary team:   Will need Lantus pen as was on Levemir before- they will no longer be manufactoring Levemir after 12/2024.  Novolog pens       Blood Glucose Checks: Three times daily before meals, and at bedtime via yandy 3 CGM. Please call if you have more than 2 blood sugar over 250 in one day, or any sugars less than 75.      Glucose Control Regimen:      Lantus insulin: 4 units every day at 8 pm     Regular insulin 22 units in TPN cycled over 12 hours with dextrose 200 g      Novolog insulin to cover carbohydrates with meals: ( \"fixed meal dose\":  2 units with each meal.  Do not give if do not eat meal ) OR If you want to try counting carbs again 1 unit of insulin for 15g of carb.     Novolog insulin correction scale: three times daily before meals and at bedtime (has Yandy)  Give correction insulin based on the following scale: *Give even if skipping a meal* at 8AM, noon, 5PM, bedtime     Do Not give Correction Insulin if BG less than 140  Do not give more frequently then every 4 hours or you will stack insulin and risk going low  Blood Glucose Before Meals, Add   Less than 140 0 units   140 -189  1 units   190 - 239 2 units   240 - 289 3 units   290 - 339 4 units    340 - 389  5 units   390 - 439 6 units   >/= 440 7 units         Endocrinology Outpatient follow up:    Patient to call and schedule outpatient diabetes appointment 1-2 weeks from discharge with current provider (Dr. Melissa)      If you have urgent questions or concerns regarding your blood sugars or insulin, you may contact [phone] (the main hospital ). Ask to speak with the endocrinologist on call.      Please notify Inpatient Diabetes Service if changes are planned to steroids, nutrition, TPN/TF and anticipated procedures requiring prolonged NPO status. "         Interval History/Review of Systems :   - The last 24 hours progress and nursing notes reviewed.  - Feeling much better today. Improved sleep last night. Still having some pain at J tube site, not planning to use for at least a few weeks per patient. Having some abdominal cramping too. Appetite overall improving, some nausea yesterday, none today. No emesis.     Planned Procedures/Surgeries:  none    Inpatient Glucose Control:          Recent Labs   Lab 10/26/24  0312 10/25/24  2236 10/25/24  1736 10/25/24  1322 10/25/24  0929 10/25/24  0531   * 141* 143* 151* 185* 123*             Medications Impacting Glycemia:   Steroids: None  D5W containing solutions/medications: None  Other medications impacting glucose: None         Nutrition:   Orders Placed This Encounter      Regular Diet Adult      Diet  Prior to admission: was on TPN on Sunday, Tuesday and Thursday nights.  Also on enteral feeds  Supplements: none   TF: NONE. Hx:   10/15/2024:  [employer] Peptide 1.5 (plain) @ 10 mL/hr via J-tube (only ran PM 10/15- 6AM 10/16).   Re-tired TF 10/24:  Re-starting [employer] Peptide 1.5 @ 10 ml/hr - continuous (1.4 g cho per hour or 33 g cho in 24 hours) -> discontinued AM 10/25  TPN:  started cycle over 12 hours with same dextrose 200g at 8 pm at 10/19/2024... current.  Continuous: dextrose 140 g (10/13) -->  dextrose 170 g (10/14-10/15) --> dextrose increased to 200 g (10/17 - 10/18)         Diabetes History: see full consult note for complete diabetes history   Diabetes Type and Duration: Since 2016, s/p pancreatectomy and TPAIT     PTA Medication Regimen: Takes insulin levemir 3 units daily and novolog 1 unit if BG is 175-275, and 2 units if >275 up to every 4 hours as needed. Reports some times she keeps on  giving correction whole night but other times does it once or not require it all.  Historical Diabetes Medications: As above     Glucose monitoring device and frequency: Checks with jim  "3  Outpatient Diabetes Provider: Dr. Melissa (last visit 4/2024)       Physical Exam:   /63 (BP Location: Right arm)   Pulse 107   Temp 98.5  F (36.9  C) (Oral)   Resp 18   Ht 1.727 m (5' 8\")   Wt 61.1 kg (134 lb 9.6 oz)   SpO2 96%   BMI 20.47 kg/m    General:  well appearing, NAD, sitting up in bed.   Lungs: unlabored breathing on RA.  Mental Status:  Alert and oriented x3  Psych:  calm, cooperative, normal mood and affect          Data:     Lab Results   Component Value Date    A1C 5.4 07/23/2024    A1C 5.6 03/20/2024    A1C 5.4 02/17/2022    A1C 5.2 11/27/2021    A1C 5.5 09/18/2021    A1C 5.4 08/05/2021    A1C 5.7 05/12/2021    A1C 5.9 (A) 04/01/2021    A1C 5.5 12/17/2020    A1C 5.8 (H) 07/30/2020       ROUTINE IP LABS (Last four results)  BMP  Recent Labs   Lab 10/26/24  0312 10/25/24  2236 10/25/24  1736 10/25/24  1322 10/25/24  0531 10/25/24  0457 10/24/24  0750 10/24/24  0523 10/23/24  1151 10/23/24  0916 10/22/24  0418 10/22/24  0416   NA  --   --   --   --   --  138  --  137  --  136  --  137   POTASSIUM  --   --   --   --   --  4.3  --  3.8  --  4.5  --  4.3   CHLORIDE  --   --   --   --   --  104  --  103  --  101  --  103   KASSI  --   --   --   --   --  8.5*  --  8.6*  --  9.1  --  8.6*   CO2  --   --   --   --   --  24  --  25  --  23  --  22   BUN  --   --   --   --   --  17.5  --  18.5  --  22.4*  --  19.3   CR  --   --   --   --   --  0.59  --  0.57  --  0.57  --  0.58   * 141* 143* 151*   < > 124*   < > 101*   < > 197*   < > 168*    < > = values in this interval not displayed.     CBC  Recent Labs   Lab 10/25/24  0530 10/24/24  0523 10/23/24  0527 10/22/24  0416   WBC 12.5* 12.8* 18.2* 19.4*   RBC 2.78* 3.19* 2.89* 2.80*   HGB 8.4* 9.5* 9.0* 8.2*   HCT 27.0* 30.6* 28.6* 26.9*   MCV 97 96 99 96   MCH 30.2 29.8 31.1 29.3   MCHC 31.1* 31.0* 31.5 30.5*   RDW 13.8 13.4 13.6 13.3   PLT 1,150* 990* 1,119* 955*     Inpatient Diabetes Service will continue to follow, please don't hesitate " to contact the team with any questions or concerns.     Homa Valencia PA-C  Inpatient Diabetes Service  Pager: [phone]  Available on EQUISO    To contact Inpatient Diabetes Service:     7 AM - 5 PM: Page the IDS GIGI following the patient that day (see filed or incomplete progress notes/consult notes under Endocrinology)    OR if uncertain of provider assignment: page job code [phone]    5 PM - 7 AM: First call after hours is to primary service.    For urgent after-hours questions, page job code for on call fellow: [phone]     I spent a total of 35 minutes on the date of the encounter doing prep/post-work, chart review, history and exam, documentation and further activities per the note including lab review, multidisciplinary communication, counseling the patient and/or coordinating care regarding acute hyper/hypoglycemic management, as well as discharge management and planning/communication.  See note for details.      Please notify inpatient diabetes service if changes are planned to steroids, nutrition, or if procedures are planned requiring prolonged NPO status. Diabetes Management Team job code: [phone]

## 2024-10-26 NOTE — PLAN OF CARE
Goal Outcome Evaluation:      Plan of Care Reviewed With: patient    Overall Patient Progress: no changeOverall Patient Progress: no change    Temp: 98.1  F (36.7  C) Temp src: Oral BP: 112/66 Pulse: 102   Resp: 18 SpO2: 98 % O2 Device: None (Room air)      Pt is A&O X4, cooperative with cares, and communicate needs. VSS, on RA. Abdominal spasms and cramps mildly relieved with current pain regimen. G-tube to gravity, intermittently clamped for medications. J-tube site leaking, ostomy pouch reinforced. Colostomy intact with loose stool output. Denies N/V and tolerating regular diet. Appetite improving. BG fairly stable. Electrolytes WDL, no replacement needed. DL PICC HL.

## 2024-10-27 LAB
GLUCOSE BLDC GLUCOMTR-MCNC: 105 MG/DL (ref 70–99)
GLUCOSE BLDC GLUCOMTR-MCNC: 121 MG/DL (ref 70–99)
GLUCOSE BLDC GLUCOMTR-MCNC: 125 MG/DL (ref 70–99)
GLUCOSE BLDC GLUCOMTR-MCNC: 128 MG/DL (ref 70–99)
GLUCOSE BLDC GLUCOMTR-MCNC: 137 MG/DL (ref 70–99)
GLUCOSE BLDC GLUCOMTR-MCNC: 138 MG/DL (ref 70–99)
GLUCOSE BLDC GLUCOMTR-MCNC: 142 MG/DL (ref 70–99)
MAGNESIUM SERPL-MCNC: 2.1 MG/DL (ref 1.7–2.3)
PHOSPHATE SERPL-MCNC: 4.2 MG/DL (ref 2.5–4.5)
POTASSIUM SERPL-SCNC: 4.7 MMOL/L (ref 3.4–5.3)

## 2024-10-27 PROCEDURE — 250N000013 HC RX MED GY IP 250 OP 250 PS 637

## 2024-10-27 PROCEDURE — 250N000013 HC RX MED GY IP 250 OP 250 PS 637: Performed by: COLON & RECTAL SURGERY

## 2024-10-27 PROCEDURE — B4185 PARENTERAL SOL 10 GM LIPIDS: HCPCS | Performed by: COLON & RECTAL SURGERY

## 2024-10-27 PROCEDURE — 84100 ASSAY OF PHOSPHORUS: CPT | Performed by: COLON & RECTAL SURGERY

## 2024-10-27 PROCEDURE — 84132 ASSAY OF SERUM POTASSIUM: CPT | Performed by: COLON & RECTAL SURGERY

## 2024-10-27 PROCEDURE — 120N000002 HC R&B MED SURG/OB UMMC

## 2024-10-27 PROCEDURE — 250N000013 HC RX MED GY IP 250 OP 250 PS 637: Performed by: PHYSICIAN ASSISTANT

## 2024-10-27 PROCEDURE — 250N000011 HC RX IP 250 OP 636: Performed by: SURGERY

## 2024-10-27 PROCEDURE — 250N000011 HC RX IP 250 OP 636

## 2024-10-27 PROCEDURE — 250N000013 HC RX MED GY IP 250 OP 250 PS 637: Performed by: SURGERY

## 2024-10-27 PROCEDURE — 258N000003 HC RX IP 258 OP 636: Performed by: SURGERY

## 2024-10-27 PROCEDURE — 99231 SBSQ HOSP IP/OBS SF/LOW 25: CPT | Mod: 24 | Performed by: STUDENT IN AN ORGANIZED HEALTH CARE EDUCATION/TRAINING PROGRAM

## 2024-10-27 PROCEDURE — 250N000009 HC RX 250

## 2024-10-27 PROCEDURE — 250N000009 HC RX 250: Performed by: STUDENT IN AN ORGANIZED HEALTH CARE EDUCATION/TRAINING PROGRAM

## 2024-10-27 PROCEDURE — 83735 ASSAY OF MAGNESIUM: CPT | Performed by: COLON & RECTAL SURGERY

## 2024-10-27 PROCEDURE — 250N000013 HC RX MED GY IP 250 OP 250 PS 637: Performed by: STUDENT IN AN ORGANIZED HEALTH CARE EDUCATION/TRAINING PROGRAM

## 2024-10-27 PROCEDURE — 250N000012 HC RX MED GY IP 250 OP 636 PS 637: Performed by: STUDENT IN AN ORGANIZED HEALTH CARE EDUCATION/TRAINING PROGRAM

## 2024-10-27 PROCEDURE — 250N000009 HC RX 250: Performed by: COLON & RECTAL SURGERY

## 2024-10-27 RX ADMIN — PANCRELIPASE 3 CAPSULE: 120000; 24000; 76000 CAPSULE, DELAYED RELEASE PELLETS ORAL at 14:35

## 2024-10-27 RX ADMIN — SMOFLIPID 250 ML: 6; 6; 5; 3 INJECTION, EMULSION INTRAVENOUS at 21:05

## 2024-10-27 RX ADMIN — CYCLOBENZAPRINE HYDROCHLORIDE 10 MG: 5 TABLET, FILM COATED ORAL at 16:53

## 2024-10-27 RX ADMIN — OXYCODONE HYDROCHLORIDE 10 MG: 5 SOLUTION ORAL at 10:01

## 2024-10-27 RX ADMIN — ENOXAPARIN SODIUM 40 MG: 40 INJECTION SUBCUTANEOUS at 15:46

## 2024-10-27 RX ADMIN — OXYCODONE HYDROCHLORIDE 10 MG: 5 SOLUTION ORAL at 18:16

## 2024-10-27 RX ADMIN — FAMOTIDINE 20 MG: 40 POWDER, FOR SUSPENSION ORAL at 20:51

## 2024-10-27 RX ADMIN — FAMOTIDINE 20 MG: 40 POWDER, FOR SUSPENSION ORAL at 08:13

## 2024-10-27 RX ADMIN — PANCRELIPASE 3 CAPSULE: 120000; 24000; 76000 CAPSULE, DELAYED RELEASE PELLETS ORAL at 18:17

## 2024-10-27 RX ADMIN — HYDROXYZINE HYDROCHLORIDE 50 MG: 25 TABLET, FILM COATED ORAL at 20:44

## 2024-10-27 RX ADMIN — AMOXICILLIN AND CLAVULANATE POTASSIUM 1 TABLET: 875; 125 TABLET, FILM COATED ORAL at 08:10

## 2024-10-27 RX ADMIN — OXYCODONE HYDROCHLORIDE 10 MG: 5 SOLUTION ORAL at 13:16

## 2024-10-27 RX ADMIN — Medication 50 MG: at 22:26

## 2024-10-27 RX ADMIN — MICONAZOLE NITRATE 1 APPLICATOR: 20 CREAM VAGINAL at 00:03

## 2024-10-27 RX ADMIN — ACETAMINOPHEN 975 MG: 325 SOLUTION ORAL at 08:08

## 2024-10-27 RX ADMIN — OXYCODONE HYDROCHLORIDE 10 MG: 5 SOLUTION ORAL at 04:25

## 2024-10-27 RX ADMIN — ACETAMINOPHEN 975 MG: 325 SOLUTION ORAL at 18:16

## 2024-10-27 RX ADMIN — BACLOFEN 20 MG: 10 TABLET ORAL at 08:09

## 2024-10-27 RX ADMIN — CYCLOBENZAPRINE HYDROCHLORIDE 10 MG: 5 TABLET, FILM COATED ORAL at 08:10

## 2024-10-27 RX ADMIN — HYDROXYZINE HYDROCHLORIDE 50 MG: 25 TABLET, FILM COATED ORAL at 08:10

## 2024-10-27 RX ADMIN — BACLOFEN 20 MG: 10 TABLET ORAL at 15:46

## 2024-10-27 RX ADMIN — Medication 25 MG: at 22:25

## 2024-10-27 RX ADMIN — LEVOTHYROXINE SODIUM 150 MCG: 75 TABLET ORAL at 08:09

## 2024-10-27 RX ADMIN — PROMETHAZINE HYDROCHLORIDE 25 MG: 25 INJECTION, SOLUTION INTRAMUSCULAR; INTRAVENOUS at 08:05

## 2024-10-27 RX ADMIN — MAGNESIUM SULFATE HEPTAHYDRATE: 500 INJECTION, SOLUTION INTRAMUSCULAR; INTRAVENOUS at 20:59

## 2024-10-27 RX ADMIN — Medication 60 MG: at 20:51

## 2024-10-27 RX ADMIN — ACETAMINOPHEN 975 MG: 325 SOLUTION ORAL at 12:30

## 2024-10-27 RX ADMIN — Medication 10 ML: at 07:12

## 2024-10-27 RX ADMIN — PROMETHAZINE HYDROCHLORIDE 25 MG: 25 INJECTION, SOLUTION INTRAMUSCULAR; INTRAVENOUS at 20:56

## 2024-10-27 RX ADMIN — AMOXICILLIN AND CLAVULANATE POTASSIUM 1 TABLET: 875; 125 TABLET, FILM COATED ORAL at 20:45

## 2024-10-27 RX ADMIN — BACLOFEN 20 MG: 10 TABLET ORAL at 20:45

## 2024-10-27 RX ADMIN — INSULIN GLARGINE 4 UNITS: 100 INJECTION, SOLUTION SUBCUTANEOUS at 23:36

## 2024-10-27 RX ADMIN — PANCRELIPASE 3 CAPSULE: 120000; 24000; 76000 CAPSULE, DELAYED RELEASE PELLETS ORAL at 11:00

## 2024-10-27 RX ADMIN — HYDROXYZINE HYDROCHLORIDE 50 MG: 25 TABLET, FILM COATED ORAL at 14:37

## 2024-10-27 RX ADMIN — ACETAMINOPHEN 975 MG: 325 SOLUTION ORAL at 02:12

## 2024-10-27 RX ADMIN — MICONAZOLE NITRATE 1 APPLICATOR: 20 CREAM VAGINAL at 21:12

## 2024-10-27 RX ADMIN — LIDOCAINE 4% 1 PATCH: 40 PATCH TOPICAL at 07:05

## 2024-10-27 RX ADMIN — PROMETHAZINE HYDROCHLORIDE 25 MG: 25 INJECTION, SOLUTION INTRAMUSCULAR; INTRAVENOUS at 15:46

## 2024-10-27 NOTE — PROGRESS NOTES
"Care Management Follow Up    Length of Stay (days): 16    Expected Discharge Date: 10/27/2024     Concerns to be Addressed: discharge planning     Patient plan of care discussed at interdisciplinary rounds: Yes - RNCC reached out to Keith Hernandez via [employer].     Anticipated Discharge Disposition: Home, Home Care, Home Infusion     Anticipated Discharge Services: Home Care  Anticipated Discharge DME: Resume: TPN, G + J tube, NEW: Ostomy Stop: Tube Feed    Patient/family educated on Medicare website which has current facility and service quality ratings: no  Education Provided on the Discharge Plan:  yes  Patient/Family in Agreement with the Plan:  yes    Referrals Placed by CM/SW: Internal Clinic Care Coordination, Homecare  Private pay costs discussed: Not applicable    Discussed  Partnership in Safe Discharge Planning  document with patient/family: No     Handoff Completed: Yes, [facility] PCP: Internal handoff referral completed    Additional Information:  Patient was anticipated to be med ready today. Increasing \"peristalsis\" abdominal pain this morning, Dinora is hopeful to feel ready to discharge tomorrow.     The following discharge plan is in place for tomorrow:  Leandro delivered ostomy supplies to her home already  Resumption of care from [facility] (G tube, J tube, Hickmann, ostomy). She usually has home care visits weekly on Mondays. RNCC left a voicemail on [facility] Care scheduling line notifying them that Dinora will not discharge today.   Dinora will discharge with J tube in place but has decided with the team she will not continue tube feedings due to pain  Dinora reports feeling good about G tube venting, new ostomy and Hickmann cares. Will resume Home care and Home infusion services as follows:      [facility] [place] ([facility]) (RN cares for G tube, J tube, Ann) (intake RN aware of new ostomy)                                              Services Available   Home Health " Services            Address   [address]  [place] [address]             Contact Information    [phone]         [facility] Home Infusion (TPN only, TF held)  [phone]  Makayla [facility] liaison, [phone]  Teams   Yoselyn Sherwood [facility] Resource Nurse, [phone]  Teams      NI Amaro faxed TPN recipe to [facility] 10/25. Per Pharmacist 10/26, no changes will be made to TPN recipe for discharge. Writer updated Yoselyn Sherwood that current recipe is good. (In the case of any changes, recipe will need to be faxed again.  Fax #[phone])  RNCC notified Kobi Welsh Nurse via Teams that patient will discharge with TPN only, TF currently held.   RNCC contacted Yoselyn Ronning, Resource Nurse via Teams to notify her that Dinora will not discharge today 10/27 due to abdominal pain     Follow up appointments currently scheduled for 10/28, will be rescheduled by primary team due to extended post-surgery hospitalization  Dinora's  will be available to transport home  Dinora would like all discharge meds to be filled at Discharge Pharmacy      Next Steps:   Internal hand off   Update Petr/CATHIE on discharge date    NI Garcia  Social Work and Care Management Department  Covering 5A: 9625-1194 & 5C: 4342-2515 (non-BMT)   Phone: [phone]  Teams  Vocera: weekdays 8:00 am - 4:30 pm.   See Vocera Care Team for off-day coverage      SEARCHABLE in [facility] - search CARE COORDINATOR     [place] [address] [place] ([phone]) Saturday & Sunday; ([phone]) [facility] Holidays     Units: 5A Onc 5201 - 5219 RNCC,  5A Onc 5220 thru 5240 RNCC, 5C OFFSERVICE [phone] RNCC & 5C OFF SERVICE [phone] RNCC   Units: 6B Vocera, 6C Card 6401 thru 6420 RNCC, 6C Card 6502 thru 6514 RNCC & 6C Card 6515 thru 6519 RNCC    Units: 7A SOT RNCC Vocera, 7B Med Surg Vocera, 7C Med Surg 7401 thru 7418 RNCC & 7C Med Surg 7872 thru 3540 RNCC   Units: 6A Pallavi & 4A RADHA  Vocera, 4C MICU Vocera, and 4E SICU Vocera     Units: 5 Ortho Vocera & 5 Med Surg Vocera    Units: 6 Med Surg Vocera & 8 Med Surg Vocera

## 2024-10-27 NOTE — PLAN OF CARE
"Goal Outcome Evaluation:  /65 (BP Location: Right arm)   Pulse 95   Temp 98  F (36.7  C) (Oral)   Resp 18   Ht 1.727 m (5' 8\")   Wt 61.1 kg (134 lb 9.6 oz)   SpO2 98%   BMI 20.47 kg/m        Plan of Care reviewed with: patient       Overall Patient progress: no change     PT noted to be comfortable, VSS, did c/o severe abdominal pain, MD updated, x1 PRN IV dilaudid administer with prn oxycodone and schedule tylenol. Colostomy with large amount of output, voiding spon. CVC infusing  TPN/lipids. G tube clamped between meds and J tube open to gravity drainage. BS check done. Cont POC.               "

## 2024-10-27 NOTE — PROGRESS NOTES
Patient is alert and oriented, walks around and was able to go outside with the spouse.  Tolerating her diet, takes her pills in crushed form and is able to swallow the small ones.  Colostomy output good and patient empties it her self. Manages her pain with Oxy, flexeril, atarax, tylenol. G tube is clamped between meds. CVC with lipid and TPN which was done this morning.  Patient showered.  Continue to monitor.

## 2024-10-27 NOTE — PROGRESS NOTES
Colorectal Surgery Progress Note  [facility]  POD#16      Subjective:  No acute events overnight. Plan was for discharge today but not feeling ready for it because of abdominal pain. Complains of cramping abdominal pain and pain around J tube site, but improving this morning. No fever/chills. On regular diet and TPN. No nausea/vomiting. Voiding independently. Passing gas and having good output from ileostomy.    Vitals:  Vitals:    10/26/24 0922 10/26/24 1619 10/27/24 0103 10/27/24 0802   BP: 112/66 107/70 115/65 104/67   BP Location: Right arm Right arm Right arm Right arm   Pulse: 102 98 95 79   Resp: 18 18 18 18   Temp: 98.1  F (36.7  C) 98.1  F (36.7  C) 98  F (36.7  C) 98  F (36.7  C)   TempSrc: Oral Oral Oral Oral   SpO2: 98% 98% 98% 99%   Weight:       Height:         I/O:  I/O last 3 completed shifts:  In: 570 [P.O.:540; I.V.:30]  Out: 3400 [Urine:1725; Emesis/NG output:1150; Stool:525]    Physical Exam:  Gen: AAOx3, NAD  Pulm: Non-labored breathing  Abd: Soft, non-distended, appropriately tender, no guarding   Incision C/D/I with staples in place   Stoma pink and viable with liquid stool and gas in bag   G tube in place.               J tube in place, with dressing around site, no leakage.  Ext:  Warm and well-perfused    BMP  Recent Labs   Lab 10/27/24  0802 10/27/24  0713 10/27/24  0210 10/26/24  2259 10/26/24  1810 10/26/24  0926 10/26/24  0536 10/25/24  0531 10/25/24  0457 10/24/24  0750 10/24/24  0523 10/23/24  1151 10/23/24  0916 10/22/24  0418 10/22/24  0416   NA  --   --   --   --   --   --   --   --  138  --  137  --  136  --  137   POTASSIUM  --  4.7  --   --   --   --  4.1  --  4.3  --  3.8  --  4.5  --  4.3   CHLORIDE  --   --   --   --   --   --   --   --  104  --  103  --  101  --  103   CO2  --   --   --   --   --   --   --   --  24  --  25  --  23  --  22   BUN  --   --   --   --   --   --   --   --  17.5  --  18.5  --  22.4*  --  19.3   CR  --   --   --   --    --   --  0.53  --  0.59  --  0.57  --  0.57  --  0.58   *  --  121* 138* 120*   < >  --    < > 124*   < > 101*   < > 197*   < > 168*   MAG  --  2.1  --   --   --   --  2.1  --  2.2  --  2.1  --  2.3   < > 2.3   PHOS  --  4.2  --   --   --   --  3.8  --  3.9  --  4.2  --  4.3   < > 3.8    < > = values in this interval not displayed.     CBC  Recent Labs   Lab 10/26/24  1001 10/25/24  0530 10/24/24  0523 10/23/24  0527   WBC 11.9* 12.5* 12.8* 18.2*   HGB 8.5* 8.4* 9.5* 9.0*   HCT 27.6* 27.0* 30.6* 28.6*   PLT 1,191* 1,150* 990* 1,119*         ASSESSMENT: This is a 59 year old female  PMH DM type 1, chronic pancreatitis, gastroparesis, s/p prior pancreatectomy with auto islet transplant in 2016, chronic abdominal pain on buprenorphine, GJ tube, J for feeds and G for decompression and needs to vent 75% of the time, thrice weekly TPN on Sun-Tues-Thurs for slow transit constipation and gastroparesis. Now SP ex lap, BAUDILIO, SBR, revision of J tube and TAC with ileorectal anastomosis and DLI on 10/11.     Today: Not ready to discharge because of abdominal pain. Will plan to observe and reassess tomorrow.  - J-tube to gravity with appliance bag around it to prevent leakage.  - Continue TPN  - Continue regular diet for comfort  - Augmentin PO.  -  Vent G tube.  - Plan for discharge home tomorrow.      Neuro/Pain: Tylenol, baclofen, flexeril, enzo, oxy, dilaudid   CV: N/A  PULM: encourage IS.  GI/FEN: TPN. Reg diet for comfort  : Voiding independently  Heme: N/A  ID: Follow WBC. PO Agumentin.  Endocrine: SSI  Other: N/A  Lines: G Tube for decompression. Ok to give meds via G Tube.               J tube to gravity. Nothing via J tube.   Activity: as tolerated.  Ppx: LVNX  Dispo: monitor pain control and possibly discharge tomorrow with home TPN.       Clinically Significant Risk Factors               # Hypoalbuminemia: Lowest albumin = 3.1 g/dL at 10/14/2024  7:34 AM, will monitor as appropriate                # Moderate  Malnutrition: based on nutrition assessment    # Financial/Environmental Concerns: none         Patient seen and discussed with Dr. Esteban, who discussed with Dr. Bolaños.    Keith Hernandez MD  General Surgery PGY-1  Pager: [phone]

## 2024-10-27 NOTE — PROGRESS NOTES
Surgery Brief Note  LAST PROCEDURE: Exploratory laparotomy, extensive lysis of adhesions, revision of Jtube and small bowel resection, TOTAL ABDOMINAL COLECTOMY WITH ILEORECTAL ANASTAMOSIS, DIVERTING LOOP ILEOSTOMY, flexible sigmoidoscopy: 10/11/2024    Paged by bedside RN regarding patient reporting uncontrolled pain.     Evaluated patient at bedside. Patient reporting 10/10 cramping abdominal pain not relieved with current regimen. Reports she ate a bigger meal earlier in the day and has been having worse pain that has minimally improved with oxycodone. Having high output from g-tube. She denies nausea/vomiting. Also reports leakage from j-tube that is causing skin irritation.     Laying in bed, appears uncomfortable   RRR to PP  NLB on RA, normal chest wall rise  Abdomen soft, moderately tender to palpation, non-distended, no rebound tenderness or rigidity       Vital Signs: Most Recent  Ranges (24 hours)   Temp: 98.1  F (36.7  C)  Pulse: 98  BP: 107/70  Resp: 18  SpO2: 98 % on None (Room air) Temp  Min: 98.1  F (36.7  C)  Max: 98.5  F (36.9  C)  Pulse  Min: 98  Max: 107  BP  Min: 100/63  Max: 112/66  Resp  Min: 18  Max: 18  SpO2  Min: 96 %  Max: 98 %     Plan:  - one time dose of dilaudid given   - continue scheduled tylenol and baclofen, prn oxycodone and flexeril   - replace appliance bag around j-tube as needed     Please page with further questions or concerns  Holly Heath MD

## 2024-10-27 NOTE — PLAN OF CARE
"Goal Outcome Evaluation:    Plan of Care Reviewed With: patient    Overall Patient Progress: improvingOverall Patient Progress: improving    Outcome Evaluation: Pain control... possible discharge tomorrow?    [id number]    /63 (BP Location: Right arm)   Pulse 97   Temp 97.9  F (36.6  C) (Oral)   Resp 18   Ht 1.727 m (5' 8\")   Wt 61.1 kg (134 lb 9.6 oz)   SpO2 97%   BMI 20.47 kg/m      Reason for admission: SP ex lap, BAUDILIO, SBR, revision of J tube and TAC with ileorectal anastomosis and DLI on 10/11   Activity: UAL  Pain: Reporting 6/10 abdominal pain. Given PRN Oxycodone just prior to shift. Given scheduled Tylenol per POC.   Neuro: AxOx4. Neuros intact.   Cardiac: WDL  Respiratory: NLB on RA. O2 sats WDL.   GI/: Voiding spontaneously with adequate UOP. Stooling via ostomy. Independent with ostomy emptying, cares.   Diet: Regular diet. Cycled TPN.   Lines: Ann CVC intact, HL x2. Site WDL. PEG x2, Ostomy.   Wounds: Midline incision well approximated with staples. No noted drainage.   Labs/imaging: Reviewed. See chart.         Continue to monitor and follow POC    "

## 2024-10-27 NOTE — PROGRESS NOTES
Inpatient Diabetes Management Service: Daily Progress Note     HPI: Dinora Mcghee is a 59 year old with pancreatogenic diabetes s/p pancreatectomy and TPAIT in 2016,complicated by gastroparesis, as well as a comorbid history of HTN,HLD, Hypothyroidism, chronic nausea/and constipation, chronic malnutrition and TPN and Tube feeds dependent , who was admitted on 10/11/2024 for exploratory laparotomy, extensive lysis of adhesions, revision of J tube,and small bowel resection. Inpatient Diabetes Service consulted for management of diabetes in the setting of continuous TPN use and expected TF use in future. G and J tube on gravity          Assessment/Plan:     Assessment:   Pancreatogenic diabetes s/p pancreatectomy and TPAIT in 2016, off insulin approx 4 years, currently req insulin and c/b  gastroparesis.  A1C-5.4% 7/23/2024  S/p ex lap, extensive lysis of adhesions, revision of J tube,and small bowel resection on 10/11/24  Chronic malnutrition: TPN cycled, TF resuming at [facility] 10/24  Abx for postoperative infection/Jtube leakage and cellulitis   BMI:  20    Plan/Recommendations:    - Regular insulin 22 units with cycled TPN dextrose at 200 g (at goal) = 0.11 units/gram dextrose.   - Lantus 4 units q 24 hrs at 1800 --> move to 2000 today (patient requests 2000 dosing for discharge)  - Novolog carb coverage: 1 unit per 15 g cho TID AC and 1/20 prn snacks/supplements  - Novolog Correction Scale:  - 1:50 >140 at 08:00, 12:00 pm and 18:00 (TPN off)  - 1:50 >140 at bedtime and 0200 (during TPN run)  - BG monitoring: TID AC, HS, 0200  - Hypoglycemia protocol    - Carb counting protocol    Discussion: Overall BG trending well. Patient no longer discharging home today 2/2 abdominal pain, possibly tomorrow. Will continue current diabetes regimen. Patient plans to call to scheduled closer follow up with Endocrinologist (Dr. Melissa), currently has appointment 12/19/2024.      Inpatient Diabetes  "Management Team Discharge Instructions:     Supplies and insulin to be ordered by Primary team:   Will need Lantus pen as was on Levemir before- they will no longer be manufactoring Levemir after 12/2024.  Novolog pens    Blood Glucose Checks: Three times daily before meals, and at bedtime via yandy 3 CGM. Please call if you have more than 2 blood sugar over 250 in one day, or any sugars less than 75.      Glucose Control Regimen:      Lantus insulin: 4 units every day at 8 pm     Regular insulin 22 units in TPN cycled over 12 hours with dextrose 200 g      Novolog insulin to cover carbohydrates with meals: ( \"fixed meal dose\":  2 units with each meal.  Do not give if do not eat meal ) OR If you want to try counting carbs again 1 unit of insulin for 15g of carb.     Novolog insulin correction scale: three times daily before meals and at bedtime (has Yandy)  Give correction insulin based on the following scale: *Give even if skipping a meal* at 8AM, noon, 5PM, bedtime     Do Not give Correction Insulin if BG less than 140  Do not give more frequently then every 4 hours or you will stack insulin and risk going low  Blood Glucose Before Meals, Add   Less than 140 0 units   140 -189  1 units   190 - 239 2 units   240 - 289 3 units   290 - 339 4 units    340 - 389  5 units   390 - 439 6 units   >/= 440 7 units         Endocrinology Outpatient follow up:    Patient to call and schedule outpatient diabetes appointment 1-2 weeks from discharge with current provider (Dr. Melissa)      If you have urgent questions or concerns regarding your blood sugars or insulin, you may contact [phone] (the main hospital ). Ask to speak with the endocrinologist on call.      Please notify Inpatient Diabetes Service if changes are planned to steroids, nutrition, TPN/TF and anticipated procedures requiring prolonged NPO status.         Interval History/Review of Systems :   - The last 24 hours progress and nursing notes " "reviewed.  - Having cramping severe abdominal pain, no longer discharging today. Still eating ok without nausea or emesis.     Planned Procedures/Surgeries:  none    Inpatient Glucose Control:          Recent Labs   Lab 10/27/24  0210 10/26/24  2259 10/26/24  1810 10/26/24  1425 10/26/24  0926 10/26/24  0312   * 138* 120* 141* 118* 142*             Medications Impacting Glycemia:   Steroids: None  D5W containing solutions/medications: None  Other medications impacting glucose: None         Nutrition:   Orders Placed This Encounter      Regular Diet Adult      Diet  Prior to admission: was on TPN on Sunday, Tuesday and Thursday nights.  Also on enteral feeds  Supplements: none   TF: NONE. Hx:   10/15/2024:  Nano Terra Peptide 1.5 (plain) @ 10 mL/hr via J-tube (only ran PM 10/15- 6AM 10/16).   Re-tired TF 10/24:  Re-starting Nano Terra Peptide 1.5 @ 10 ml/hr - continuous (1.4 g cho per hour or 33 g cho in 24 hours) -> discontinued AM 10/25  TPN:  started cycle over 12 hours with same dextrose 200g at 8 pm at 10/19/2024... current.  Continuous: dextrose 140 g (10/13) -->  dextrose 170 g (10/14-10/15) --> dextrose increased to 200 g (10/17 - 10/18)         Diabetes History: see full consult note for complete diabetes history   Diabetes Type and Duration: Since 2016, s/p pancreatectomy and TPAIT     PTA Medication Regimen: Takes insulin levemir 3 units daily and novolog 1 unit if BG is 175-275, and 2 units if >275 up to every 4 hours as needed. Reports some times she keeps on  giving correction whole night but other times does it once or not require it all.  Historical Diabetes Medications: As above     Glucose monitoring device and frequency: Checks with jim 3  Outpatient Diabetes Provider: Dr. Melissa (last visit 4/2024)       Physical Exam:   /65 (BP Location: Right arm)   Pulse 95   Temp 98  F (36.7  C) (Oral)   Resp 18   Ht 1.727 m (5' 8\")   Wt 61.1 kg (134 lb 9.6 oz)   SpO2 98%   BMI 20.47 " kg/m     General:  well appearing, NAD, resting in bed.   Lungs: unlabored breathing on RA.  Mental Status:  Alert and oriented x3  Psych:  calm, cooperative, normal mood and affect          Data:     Lab Results   Component Value Date    A1C 5.4 07/23/2024    A1C 5.6 03/20/2024    A1C 5.4 02/17/2022    A1C 5.2 11/27/2021    A1C 5.5 09/18/2021    A1C 5.4 08/05/2021    A1C 5.7 05/12/2021    A1C 5.9 (A) 04/01/2021    A1C 5.5 12/17/2020    A1C 5.8 (H) 07/30/2020       ROUTINE IP LABS (Last four results)  BMP  Recent Labs   Lab 10/27/24  0210 10/26/24  2259 10/26/24  1810 10/26/24  1425 10/26/24  0926 10/26/24  0536 10/25/24  0531 10/25/24  0457 10/24/24  0750 10/24/24  0523 10/23/24  1151 10/23/24  0916 10/22/24  0418 10/22/24  0416   NA  --   --   --   --   --   --   --  138  --  137  --  136  --  137   POTASSIUM  --   --   --   --   --  4.1  --  4.3  --  3.8  --  4.5  --  4.3   CHLORIDE  --   --   --   --   --   --   --  104  --  103  --  101  --  103   KASSI  --   --   --   --   --   --   --  8.5*  --  8.6*  --  9.1  --  8.6*   CO2  --   --   --   --   --   --   --  24  --  25  --  23  --  22   BUN  --   --   --   --   --   --   --  17.5  --  18.5  --  22.4*  --  19.3   CR  --   --   --   --   --  0.53  --  0.59  --  0.57  --  0.57  --  0.58   * 138* 120* 141*   < >  --    < > 124*   < > 101*   < > 197*   < > 168*    < > = values in this interval not displayed.     CBC  Recent Labs   Lab 10/26/24  1001 10/25/24  0530 10/24/24  0523 10/23/24  0527   WBC 11.9* 12.5* 12.8* 18.2*   RBC 2.88* 2.78* 3.19* 2.89*   HGB 8.5* 8.4* 9.5* 9.0*   HCT 27.6* 27.0* 30.6* 28.6*   MCV 96 97 96 99   MCH 29.5 30.2 29.8 31.1   MCHC 30.8* 31.1* 31.0* 31.5   RDW 13.7 13.8 13.4 13.6   PLT 1,191* 1,150* 990* 1,119*     Inpatient Diabetes Service will continue to follow, please don't hesitate to contact the team with any questions or concerns.     Homa Valencia PA-C  Inpatient Diabetes Service  Pager: [phone]  Available on vocera    To  contact Inpatient Diabetes Service:     7 AM - 5 PM: Page the IDS GIGI following the patient that day (see filed or incomplete progress notes/consult notes under Endocrinology)    OR if uncertain of provider assignment: page job code [phone]    5 PM - 7 AM: First call after hours is to primary service.    For urgent after-hours questions, page job code for on call fellow: [phone]     I spent a total of 25 minutes on the date of the encounter doing prep/post-work, chart review, history and exam, documentation and further activities per the note including lab review, multidisciplinary communication, counseling the patient and/or coordinating care regarding acute hyper/hypoglycemic management, as well as discharge management and planning/communication.  See note for details.      Please notify inpatient diabetes service if changes are planned to steroids, nutrition, or if procedures are planned requiring prolonged NPO status. Diabetes Management Team job code: [phone]

## 2024-10-28 ENCOUNTER — HOME INFUSION BILLING (OUTPATIENT)
Dept: HOME HEALTH SERVICES | Facility: HOME HEALTH | Age: 59
End: 2024-10-28
Payer: COMMERCIAL

## 2024-10-28 ENCOUNTER — HOME INFUSION (OUTPATIENT)
Dept: HOME HEALTH SERVICES | Facility: HOME HEALTH | Age: 59
End: 2024-10-28
Payer: COMMERCIAL

## 2024-10-28 VITALS
TEMPERATURE: 98.1 F | SYSTOLIC BLOOD PRESSURE: 92 MMHG | DIASTOLIC BLOOD PRESSURE: 63 MMHG | WEIGHT: 139.3 LBS | HEIGHT: 68 IN | BODY MASS INDEX: 21.11 KG/M2 | OXYGEN SATURATION: 97 % | RESPIRATION RATE: 18 BRPM | HEART RATE: 100 BPM

## 2024-10-28 LAB
ALBUMIN SERPL BCG-MCNC: 3.3 G/DL (ref 3.5–5.2)
ALP SERPL-CCNC: 229 U/L (ref 40–150)
ALT SERPL W P-5'-P-CCNC: 49 U/L (ref 0–50)
ANION GAP SERPL CALCULATED.3IONS-SCNC: 8 MMOL/L (ref 7–15)
AST SERPL W P-5'-P-CCNC: 37 U/L (ref 0–45)
BILIRUB SERPL-MCNC: 0.2 MG/DL
BUN SERPL-MCNC: 22.4 MG/DL (ref 6–20)
CALCIUM SERPL-MCNC: 8.3 MG/DL (ref 8.8–10.4)
CHLORIDE SERPL-SCNC: 104 MMOL/L (ref 98–107)
CREAT SERPL-MCNC: 0.53 MG/DL (ref 0.51–0.95)
EGFRCR SERPLBLD CKD-EPI 2021: >90 ML/MIN/1.73M2
ERYTHROCYTE [DISTWIDTH] IN BLOOD BY AUTOMATED COUNT: 13.8 % (ref 10–15)
GLUCOSE BLDC GLUCOMTR-MCNC: 106 MG/DL (ref 70–99)
GLUCOSE BLDC GLUCOMTR-MCNC: 91 MG/DL (ref 70–99)
GLUCOSE BLDC GLUCOMTR-MCNC: 97 MG/DL (ref 70–99)
GLUCOSE SERPL-MCNC: 98 MG/DL (ref 70–99)
HCO3 SERPL-SCNC: 25 MMOL/L (ref 22–29)
HCT VFR BLD AUTO: 26.8 % (ref 35–47)
HGB BLD-MCNC: 8.3 G/DL (ref 11.7–15.7)
INR PPP: 1.11 (ref 0.85–1.15)
MAGNESIUM SERPL-MCNC: 2 MG/DL (ref 1.7–2.3)
MCH RBC QN AUTO: 30.2 PG (ref 26.5–33)
MCHC RBC AUTO-ENTMCNC: 31 G/DL (ref 31.5–36.5)
MCV RBC AUTO: 98 FL (ref 78–100)
PHOSPHATE SERPL-MCNC: 4.4 MG/DL (ref 2.5–4.5)
PLATELET # BLD AUTO: 1071 10E3/UL (ref 150–450)
PLATELET # BLD AUTO: 1204 10E3/UL (ref 150–450)
POTASSIUM SERPL-SCNC: 4.1 MMOL/L (ref 3.4–5.3)
PREALB SERPL-MCNC: 20.7 MG/DL (ref 20–40)
PROT SERPL-MCNC: 6.1 G/DL (ref 6.4–8.3)
RBC # BLD AUTO: 2.75 10E6/UL (ref 3.8–5.2)
SODIUM SERPL-SCNC: 137 MMOL/L (ref 135–145)
WBC # BLD AUTO: 12.2 10E3/UL (ref 4–11)

## 2024-10-28 PROCEDURE — 250N000013 HC RX MED GY IP 250 OP 250 PS 637: Performed by: PHYSICIAN ASSISTANT

## 2024-10-28 PROCEDURE — 82947 ASSAY GLUCOSE BLOOD QUANT: CPT | Performed by: COLON & RECTAL SURGERY

## 2024-10-28 PROCEDURE — 250N000013 HC RX MED GY IP 250 OP 250 PS 637

## 2024-10-28 PROCEDURE — 84155 ASSAY OF PROTEIN SERUM: CPT | Performed by: COLON & RECTAL SURGERY

## 2024-10-28 PROCEDURE — 83735 ASSAY OF MAGNESIUM: CPT | Performed by: COLON & RECTAL SURGERY

## 2024-10-28 PROCEDURE — 85014 HEMATOCRIT: CPT | Performed by: STUDENT IN AN ORGANIZED HEALTH CARE EDUCATION/TRAINING PROGRAM

## 2024-10-28 PROCEDURE — 250N000011 HC RX IP 250 OP 636

## 2024-10-28 PROCEDURE — 250N000013 HC RX MED GY IP 250 OP 250 PS 637: Performed by: COLON & RECTAL SURGERY

## 2024-10-28 PROCEDURE — 85027 COMPLETE CBC AUTOMATED: CPT | Performed by: COLON & RECTAL SURGERY

## 2024-10-28 PROCEDURE — B4185 PARENTERAL SOL 10 GM LIPIDS: HCPCS

## 2024-10-28 PROCEDURE — 250N000013 HC RX MED GY IP 250 OP 250 PS 637: Performed by: SURGERY

## 2024-10-28 PROCEDURE — 99232 SBSQ HOSP IP/OBS MODERATE 35: CPT | Mod: 24 | Performed by: STUDENT IN AN ORGANIZED HEALTH CARE EDUCATION/TRAINING PROGRAM

## 2024-10-28 PROCEDURE — 84134 ASSAY OF PREALBUMIN: CPT | Performed by: COLON & RECTAL SURGERY

## 2024-10-28 PROCEDURE — 84100 ASSAY OF PHOSPHORUS: CPT | Performed by: COLON & RECTAL SURGERY

## 2024-10-28 PROCEDURE — 85610 PROTHROMBIN TIME: CPT | Performed by: COLON & RECTAL SURGERY

## 2024-10-28 PROCEDURE — A4217 STERILE WATER/SALINE, 500 ML: HCPCS

## 2024-10-28 PROCEDURE — 250N000013 HC RX MED GY IP 250 OP 250 PS 637: Performed by: STUDENT IN AN ORGANIZED HEALTH CARE EDUCATION/TRAINING PROGRAM

## 2024-10-28 PROCEDURE — G0463 HOSPITAL OUTPT CLINIC VISIT: HCPCS

## 2024-10-28 RX ORDER — ASCORBIC ACID, VITAMIN A PALMITATE, CHOLECALCIFEROL, THIAMINE HYDROCHLORIDE, RIBOFLAVIN-5 PHOSPHATE SODIUM, PYRIDOXINE HYDROCHLORIDE, NIACINAMIDE, DEXPANTHENOL, ALPHA-TOCOPHEROL ACETATE, VITAMIN K1, FOLIC ACID, BIOTIN, CYANOCOBALAMIN 200; 3300; 200; 6; 3.6; 6; 40; 15; 10; 150; 600; 60; 5 MG/10ML; [IU]/10ML; [IU]/10ML; MG/10ML; MG/10ML; MG/10ML; MG/10ML; MG/10ML; [IU]/10ML; UG/10ML; UG/10ML; UG/10ML; UG/10ML
10 INJECTION, SOLUTION INTRAVENOUS DAILY
Qty: 3600 ML | Refills: 0 | Status: DISPENSED | OUTPATIENT
Start: 2024-10-28 | End: 2025-10-28

## 2024-10-28 RX ORDER — PROMETHAZINE HYDROCHLORIDE 25 MG/ML
25 INJECTION, SOLUTION INTRAMUSCULAR; INTRAVENOUS EVERY 8 HOURS PRN
Qty: 1095 ML | Refills: 0 | Status: DISPENSED | OUTPATIENT
Start: 2024-10-28 | End: 2025-10-28

## 2024-10-28 RX ADMIN — LIDOCAINE 4% 1 PATCH: 40 PATCH TOPICAL at 05:18

## 2024-10-28 RX ADMIN — CYCLOBENZAPRINE HYDROCHLORIDE 10 MG: 5 TABLET, FILM COATED ORAL at 02:36

## 2024-10-28 RX ADMIN — PANCRELIPASE 3 CAPSULE: 120000; 24000; 76000 CAPSULE, DELAYED RELEASE PELLETS ORAL at 09:54

## 2024-10-28 RX ADMIN — OXYCODONE HYDROCHLORIDE 10 MG: 5 SOLUTION ORAL at 11:35

## 2024-10-28 RX ADMIN — HYDROXYZINE HYDROCHLORIDE 25 MG: 25 TABLET, FILM COATED ORAL at 06:11

## 2024-10-28 RX ADMIN — Medication 5 ML: at 05:19

## 2024-10-28 RX ADMIN — CYCLOBENZAPRINE HYDROCHLORIDE 10 MG: 5 TABLET, FILM COATED ORAL at 10:28

## 2024-10-28 RX ADMIN — ACETAMINOPHEN 975 MG: 325 SOLUTION ORAL at 11:34

## 2024-10-28 RX ADMIN — ACETAMINOPHEN 975 MG: 325 SOLUTION ORAL at 08:50

## 2024-10-28 RX ADMIN — AMOXICILLIN AND CLAVULANATE POTASSIUM 1 TABLET: 875; 125 TABLET, FILM COATED ORAL at 08:50

## 2024-10-28 RX ADMIN — OXYCODONE HYDROCHLORIDE 10 MG: 5 SOLUTION ORAL at 06:11

## 2024-10-28 RX ADMIN — BACLOFEN 20 MG: 10 TABLET ORAL at 08:50

## 2024-10-28 RX ADMIN — FAMOTIDINE 20 MG: 40 POWDER, FOR SUSPENSION ORAL at 08:50

## 2024-10-28 RX ADMIN — HEPARIN, PORCINE (PF) 10 UNIT/ML INTRAVENOUS SYRINGE 5 ML: at 11:35

## 2024-10-28 RX ADMIN — OXYCODONE HYDROCHLORIDE 10 MG: 5 SOLUTION ORAL at 00:14

## 2024-10-28 RX ADMIN — PROCHLORPERAZINE EDISYLATE 10 MG: 5 INJECTION INTRAMUSCULAR; INTRAVENOUS at 10:28

## 2024-10-28 RX ADMIN — ACETAMINOPHEN 975 MG: 325 SOLUTION ORAL at 02:36

## 2024-10-28 RX ADMIN — HYDROXYZINE HYDROCHLORIDE 25 MG: 25 TABLET, FILM COATED ORAL at 12:22

## 2024-10-28 RX ADMIN — LEVOTHYROXINE SODIUM 150 MCG: 75 TABLET ORAL at 08:50

## 2024-10-28 ASSESSMENT — ACTIVITIES OF DAILY LIVING (ADL)
ADLS_ACUITY_SCORE: 0

## 2024-10-28 NOTE — PROGRESS NOTES
"[facility]  WO Nurse Inpatient Assessment     Consulted for: Diverting loop ileostomy  10/18: new consult for J tube leakage    10/21 summary:  pain around J tube site likely mostly related to suture wound.  Improvement of seal around the tube with stabilization.  Anticipate that will be able to discontinue pouching within this week  10/24: Went to drop off ostomy resources and small mirror and pt showed WOC her abdomen and explained they are starting feeds thru the J-tube today so they removed the pouch. [facility] will adjust orders today and return 10/25 for ileostomy pouch change and to assess plan for j-tube  10/28:  no drainage around the j tube, pouching removed   Fungal overgrowth under the ileostomy appliance.  Requested antifungal powder   Patient History (according to provider note(s):      \"58 y/o female has a past medical history of Allergic rhinitis, cause unspecified, Allergy to other foods, Arthritis, Choledocholithiasis, Chronic abdominal pain, Chronic constipation, Chronic nausea, Chronic pancreatitis (H), Degeneration of lumbar or lumbosacral intervertebral disc, Esophageal reflux, Gastroparesis, Hiatal hernia, History of pituitary adenoma, Hypothyroidism, Migraines, Mild hyperlipidemia, On tube feeding diet, Pancreatic disease, PONV (postoperative nausea and vomiting), Portacath in place, Type 1 diabetes mellitus without complication (H) (5/9/2022), and Unspecified hearing loss.\"     Assessment:      Tube type and location:  J tube in LUQ    10/18, 10/28  Last photo 10/28/24  History: placed in OR 10/11/24.  10/17/24 CT scan showed J tube in good position   Current Tube Securement: bumper sutured to skin   16 Fr tube with 1 lumens   Leakage around tube: small  Description of leakage/drainage:  bilious  Insertion site assessment: Sutures and no gapping  Peritubular skin assessment:  bright red Erythema extending up to 0.2 cm from insertion site  Wound at 6 " o'clock where the bumper had been sutured to the skin: pinpoint open area, otherwise with pink/red no epithelium    Palpation of the wound bed: normal      Wound Drainage: none     Description of drainage: none  ?? Temperature? normal   Pain: mild, tender  Pain interventions prior to dressing change: patient tolerated well  STATUS: improving seal around the tube.   Supplies ordered: gathered     Assessment of new end Ileostomy:  Diagnosis Pertinent to Stoma:  Slow transit constipation       Surgery Date: 10/11/24  Sur[facility]  Pouching system in place on assessment today: Leandro one piece, cut to fit, flat, and barrier ring   Pouch barrier status: Minimal melting  Pouch last changed/wear time: 3 days  Reason for pouch change today: routine schedule  Effectiveness of current pouching/ supply plan: Effective  Change made with ostomy management today: Yes  Pouching system in place after visit today: Leandro 2 piece flat cut to fit with barrier ring   Supplies: gathered, at bedside, supplies stored on unit, and discussed with patient     Stoma location: RLQ  Stoma size: 1 1/4 inches  Stoma appearance: red, round, moist, edematous, and protruberant  Mucocutaneous junction:  intact  Peristomal complication(s): suture wound at 3 o'clock.  raised macular/papular rash with satellite lesions.  Pt c/o itching   Peristomal dynamics affecting pouching:  horizontal crease inferior to the stoma created by soft pannus  Output: watery and yellow.  Has been thin paste consistency at times now that she is tolerating regular diet  Output volume emptied during visit: 50 ml   Abdominal assessment: Soft  Surgical site(s): open to air  NG still in place? No  Pain: Sharp  Is patient still on a PCA? No    Ostomy education assessment:  Participant of teaching session today: patient   Education completed today: Pouching system assessment , Pouch change return demonstration, Importance of hydration, Low fiber  "diet , and Discharge instructions  Educational materials/methods: Verbal, Written, MARGARET Medrano education hand out, and Addressed patient/caregiver questions/concerns  Education still needed:none  Learning Comprehension:   Psychosocial assessment: is a retired  and has had to manage TPN, drains at home with home care assist   Patient readiness for education today: attentive and active participation  Following visit: patient  is able to demonstrate;         1. How to empty their pouch? Yes and Independent        2. How to change their pouch? Yes and with minimal assist        3. How to read and record intake and output correctly? Yes  Preparation for discharge completed: Placed prescription recommendations in discharge navigator for MD to sign, Ensured patient has extra supplies for discharge, Discussed how to order supplies after discharge , Ordered samples from  after gaining consent from patient/caregiver, Discussed how and when to make an outpatient WOC nurse appointment after discharge, Prepared for discharge home with home care, and Discuss signs/symptoms of when to seek medical attention  Preparation for discharge still needed: Complete  Pt support system on discharge: spouse  WOC recommend home care? Yes  Face to face time: 45 minutes    Treatment Plan:     J tube site:    Cares per protocol     RLQ Ileostomy pouching plan:   Pouching system: ostomy supplies pouches: [place] 57 FECAL ([id number]) ostomy supplies barrier: Leandro 57mm FLAT ([id number])  Accessories used: WO ostomy accessories: 2\" Cera Barrier Ring ([id number])   Frequency of pouch changes: PRN leakage and Three times a week  WOC follow up plan: Twice weekly  Bedside RN interventions: Change pouch PRN if leaking using the supplies above, Empty pouch when 1/3 to 1/2 full, ensure to clean pouch outlet after emptying to prevent odor, Notify WOC for ongoing pouch leakage, Document stoma appearance and output volume, color, and " consistency every shift, and Encourage patient to empty pouch with assist     Outpatient plan:  Request the following supplies:     [place]    2 piece flat wafer 57mm  # 47644 or 2 piece soft convex wafer 57 mm #18770                          2 piece opaque Fecal pouch without Filter 57mm- # 19024  Or                           1 piece Coloplast [place] convex light cut to fit  #18850  Accessories  2  barrier ring #5882     Adapt powder #7906    No sting film barrier # 6414                           Adapt universal adhesive remover #2820                          Adapt M-9 Spray room deodorizer #8321                           [place] barrier extenders #37667                                   Change your pouch 2 times a week, more often if leaking.                                DATA:     Current support surface: Standard  Standard gel mattress (Isoflex)  Containment of urine/stool: Ileostomy pouch  BMI: Body mass index is 21.18 kg/m .   Active diet order: Orders Placed This Encounter      Regular Diet Adult      Diet     Output: I/O last 3 completed shifts:  In: 2176 [P.O.:480; I.V.:55; NG/GT:220]  Out: 5150 [Urine:3000; Emesis/NG output:500; Stool:1650]     Labs:   Recent Labs   Lab 10/28/24  0521   ALBUMIN 3.3*   PREALB 20.7   HGB 8.3*   INR 1.11   WBC 12.2*     Pressure injury risk assessment:   Sensory Perception: 4-->no impairment  Moisture: 4-->rarely moist  Activity: 3-->walks occasionally  Mobility: 4-->no limitation  Nutrition: 3-->adequate  Friction and Shear: 3-->no apparent problem  Martinez Score: 21    Pager no longer is use, please contact through Pallavi Olivo group: [facility] Nurse [place]  Dept. Office Number: [phone]

## 2024-10-28 NOTE — PROVIDER NOTIFICATION
Px alert and oriented x4, able to make needs known. C/o abdominal pain rated 4-7/10 on pain scale, PRN meds given and was effective. C/o nausea and PRN compazine given and was effective. Discharge instructions given and was understood. Out of the unit via wheelchair accompanied by , passing by discharge pharmacy before leaving the building.

## 2024-10-28 NOTE — PROGRESS NOTES
"Care Management Discharge Note    Discharge Date: 10/28/2024     Discharge Disposition: Home, Home Care, Home Infusion    Discharge Services: Home Care, home infusion    Discharge DME: Resumption of services with [facility] for nursing cares, [facility] [place] [facility]    Discharge Transportation: family or friend will provide -      Private pay costs discussed: Not applicable    Does the patient's insurance plan have a 3 day qualifying hospital stay waiver?  No    PAS Confirmation Code:    Patient/family educated on Medicare website which has current facility and service quality ratings: n/a    Education Provided on the Discharge Plan:  Yes  Persons Notified of Discharge Plans: Patient, [facility] liaisons, [facility] (MONICA Fernandez)  Patient/Family in Agreement with the Plan:  Yes    Handoff Referral Completed: Yes, [facility] PCP: Internal handoff referral completed    Additional Information:  Per MD note: \"PMH DM type 1, chronic pancreatitis, gastroparesis, s/p prior pancreatectomy with auto islet transplant in 2016, chronic abdominal pain on buprenorphine, GJ tube, J for feeds and G for decompression and needs to vent 75% of the time, thrice weekly TPN on Sun-Tues-Thurs for slow transit constipation and gastroparesis. Now SP ex lap, BAUDILIO, SBR, revision of J tube and TAC with ileorectal anastomosis and DLI on 10/11.\"    RNCC notified in care rounds that patient is ready to discharge today.   RNCC met with patient at the bedside. Dinora is in agreement with plan to discharge today with the following discharge services as listed below:     [facility] [place] [facility] (RN cares for G tube, J tube, Ann, new ostomy)                                                   Services Available   Home Health Services             Address   [address] [place] [address] [address]GUIN [address]  [facility] [address]             Contact Information    [phone]               RNCC updated intake MONICA Fernandez that patient will discharge home today. " RNCC confirmed that [facility] provides nursing cares in coordination with [facility] who managed TPN  Faxed discharge orders to [phone]  RNCC confirmed with MD Keith Leary (who contacted Attending Sarina) that Dr. Park should continue to follow TPN  Dinora will discharge with J tube in place but has decided with the team she will not continue tube feedings due to pain   Leandro delivered ostomy supplies to her home already    [facility] (TPN only)  [phone]  CARLYN Benavides liaison, [phone]  Teams   NI updated Makayla and CATHIE Hernandez liaisons of plan to discharge today  Most updated TPN orders faxed to [phone], also emailed to NI Rosado  Care Management Department  Covering 5A: [id number] & 5C: 8836-1951 (non-BMT)   Phone: [phone]  Teams  Vocera: weekdays 8:00 am - 4:30 pm.   See Vocera Care Team for off-day coverage

## 2024-10-28 NOTE — PROGRESS NOTES
Inpatient Diabetes Management Service: Daily Progress Note     HPI: Dinora Mcghee is a 59 year old with pancreatogenic diabetes s/p pancreatectomy and TPAIT in 2016,complicated by gastroparesis, as well as a comorbid history of HTN,HLD, Hypothyroidism, chronic nausea/and constipation, chronic malnutrition and TPN and Tube feeds dependent , who was admitted on 10/11/2024 for exploratory laparotomy, extensive lysis of adhesions, revision of J tube,and small bowel resection. Inpatient Diabetes Service consulted for management of diabetes in the setting of continuous TPN use and expected TF use in future. G and J tube on gravity          Assessment/Plan:     Assessment:   Pancreatogenic diabetes s/p pancreatectomy and TPAIT in 2016, off insulin approx 4 years, currently req insulin and c/b  gastroparesis.  A1C-5.4% 7/23/2024  S/p ex lap, extensive lysis of adhesions, revision of J tube,and small bowel resection on 10/11/24  Chronic malnutrition: TPN cycled, TF resuming at [facility] 10/24  Abx for postoperative infection/Jtube leakage and cellulitis   BMI:  20    Plan/Recommendations:    - Decrease Regular insulin 22 --> 18 units with cycled TPN dextrose at 200 g (at goal) = 0.09 units/gram dextrose.   - Decrease Lantus 4 --> 3 units q 24 hrs at 2000 (patient prefers 2000 dosing)  - Novolog carb coverage: 1 unit per 15 g cho TID AC and 1/20 prn snacks/supplements  - Novolog Correction Scale:  - 1:50 >140 at 08:00, 12:00 pm and 18:00 (TPN off)  - 1:50 >140 at bedtime and 0200 (during TPN run)  - BG monitoring: TID AC, HS, 0200  - Hypoglycemia protocol    - Carb counting protocol    Discussion: Tight BG control overnight while cyclic TPN running, decrease Regular insulin with TPN (patient prefers to be more aggressive with the decrease down to 18 units vs 20 units, can increase if needed outpatient with Dr. Melissa in a few days). Pharmacy aware of TPN insulin change. Patient also ate breakfast and  "lunch yesterday without carbohydrate coverage and BG continued to trend well, therefore will slightly reduce Lantus too. Discharging home today, reviewed below discharge plan with patient. Patient plans to call to scheduled closer follow up with Endocrinologist (Dr. Melissa), currently has appointment 12/19/2024.      Inpatient Diabetes Management Team Discharge Instructions:     Supplies and insulin to be ordered by Primary team:   Will need Lantus pen as was on Levemir before- they will no longer be manufactoring Levemir after 12/2024.  Novolog pens    Blood Glucose Checks: Three times daily before meals, and at bedtime via yandy 3 CGM. Please call if you have more than 2 blood sugar over 250 in one day, or any sugars less than 75.      Glucose Control Regimen:      Lantus insulin: 3 units every day at 8 pm     Regular insulin: 18 units in TPN cycled over 12 hours with dextrose 200 g      Novolog insulin to cover carbohydrates with meals: ( \"fixed meal dose\":  2 units with each meal.  Do not give if do not eat meal ) OR If you want to try counting carbs again 1 unit of insulin for 15g of carb.     Novolog insulin correction scale: three times daily before meals and at bedtime (has Yandy).  Give correction insulin based on the following scale: *Give even if skipping a meal* at 8AM, noon, 5PM, bedtime (bedtime correct above 140 since TPN is infusing overnight).      Do not give more frequently then every 4 hours or you will stack insulin and risk going low  Blood Glucose Before Meals, Add   Less than 140 0 units   140 -189  1 units   190 - 239 2 units   240 - 289 3 units   290 - 339 4 units    340 - 389  5 units   390 - 439 6 units   >/= 440 7 units         Endocrinology Outpatient follow up:    Patient to call and schedule outpatient diabetes appointment 1-2 weeks from discharge with current provider (Dr. Melissa)      If you have urgent questions or concerns regarding your blood sugars or insulin, you may contact " [phone] (the main hospital ). Ask to speak with the endocrinologist on call.      Please notify Inpatient Diabetes Service if changes are planned to steroids, nutrition, TPN/TF and anticipated procedures requiring prolonged NPO status.         Interval History/Review of Systems :   - The last 24 hours progress and nursing notes reviewed.  - Feeling better today, eating without n/v. Continues to have cramping abdominal pain albeit improving.   - Excited to discharge home. Has Yandy 3 and glucometer supplies at home.     Planned Procedures/Surgeries:  none    Inpatient Glucose Control:          Recent Labs   Lab 10/28/24  0243 10/28/24  0044 10/27/24  2219 10/27/24  1809 10/27/24  1128 10/27/24  1048   GLC 91 106* 128* 142* 137* 105*             Medications Impacting Glycemia:   Steroids: None  D5W containing solutions/medications: None  Other medications impacting glucose: None         Nutrition:   Orders Placed This Encounter      Regular Diet Adult      Diet  Prior to admission: was on TPN on Sunday, Tuesday and Thursday nights.  Also on enteral feeds  Supplements: none   TF: NONE. Hx:   10/15/2024:  LightTable Peptide 1.5 (plain) @ 10 mL/hr via J-tube (only ran PM 10/15- 6AM 10/16).   Re-tired TF 10/24:  Re-starting LightTable Peptide 1.5 @ 10 ml/hr - continuous (1.4 g cho per hour or 33 g cho in 24 hours) -> discontinued AM 10/25  TPN:  started cycle over 12 hours with same dextrose 200g at 8 pm at 10/19/2024... current.  Continuous: dextrose 140 g (10/13) -->  dextrose 170 g (10/14-10/15) --> dextrose increased to 200 g (10/17 - 10/18)         Diabetes History: see full consult note for complete diabetes history   Diabetes Type and Duration: Since 2016, s/p pancreatectomy and TPAIT     PTA Medication Regimen: Takes insulin levemir 3 units daily and novolog 1 unit if BG is 175-275, and 2 units if >275 up to every 4 hours as needed. Reports some times she keeps on  giving correction whole night but  "other times does it once or not require it all.  Historical Diabetes Medications: As above     Glucose monitoring device and frequency: Checks with Corpsolv 3  Outpatient Diabetes Provider: Dr. Melissa (last visit 4/2024)       Physical Exam:   /59 (BP Location: Left arm)   Pulse 91   Temp 98.1  F (36.7  C) (Oral)   Resp 18   Ht 1.727 m (5' 8\")   Wt 61.1 kg (134 lb 9.6 oz)   SpO2 96%   BMI 20.47 kg/m    General:  well appearing, NAD, sitting up in chair.   Lungs: unlabored breathing on RA.  Mental Status:  Alert and oriented x3  Psych:  calm, cooperative, normal mood and affect          Data:     Lab Results   Component Value Date    A1C 5.4 07/23/2024    A1C 5.6 03/20/2024    A1C 5.4 02/17/2022    A1C 5.2 11/27/2021    A1C 5.5 09/18/2021    A1C 5.4 08/05/2021    A1C 5.7 05/12/2021    A1C 5.9 (A) 04/01/2021    A1C 5.5 12/17/2020    A1C 5.8 (H) 07/30/2020       ROUTINE IP LABS (Last four results)  BMP  Recent Labs   Lab 10/28/24  0243 10/28/24  0044 10/27/24  2219 10/27/24  1809 10/27/24  0802 10/27/24  0713 10/26/24  0926 10/26/24  0536 10/25/24  0531 10/25/24  0457 10/24/24  0750 10/24/24  0523 10/23/24  1151 10/23/24  0916 10/22/24  0418 10/22/24  0416   NA  --   --   --   --   --   --   --   --   --  138  --  137  --  136  --  137   POTASSIUM  --   --   --   --   --  4.7  --  4.1  --  4.3  --  3.8  --  4.5  --  4.3   CHLORIDE  --   --   --   --   --   --   --   --   --  104  --  103  --  101  --  103   KASSI  --   --   --   --   --   --   --   --   --  8.5*  --  8.6*  --  9.1  --  8.6*   CO2  --   --   --   --   --   --   --   --   --  24  --  25  --  23  --  22   BUN  --   --   --   --   --   --   --   --   --  17.5  --  18.5  --  22.4*  --  19.3   CR  --   --   --   --   --   --   --  0.53  --  0.59  --  0.57  --  0.57  --  0.58   GLC 91 106* 128* 142*   < >  --    < >  --    < > 124*   < > 101*   < > 197*   < > 168*    < > = values in this interval not displayed.     CBC  Recent Labs   Lab " 10/28/24  0521 10/26/24  1001 10/25/24  0530 10/24/24  0523 10/23/24  0527   WBC  --  11.9* 12.5* 12.8* 18.2*   RBC  --  2.88* 2.78* 3.19* 2.89*   HGB  --  8.5* 8.4* 9.5* 9.0*   HCT  --  27.6* 27.0* 30.6* 28.6*   MCV  --  96 97 96 99   MCH  --  29.5 30.2 29.8 31.1   MCHC  --  30.8* 31.1* 31.0* 31.5   RDW  --  13.7 13.8 13.4 13.6   PLT 1,204* 1,191* 1,150* 990* 1,119*     Inpatient Diabetes Service will continue to follow, please don't hesitate to contact the team with any questions or concerns.     Homa Valencia PA-C  Inpatient Diabetes Service  Pager: [phone]  Available on [email or web address]    To contact Inpatient Diabetes Service:     7 AM - 5 PM: Page the IDS GIGI following the patient that day (see filed or incomplete progress notes/consult notes under Endocrinology)    OR if uncertain of provider assignment: page job code [phone]    5 PM - 7 AM: First call after hours is to primary service.    For urgent after-hours questions, page job code for on call fellow: [phone]     I spent a total of 35 minutes on the date of the encounter doing prep/post-work, chart review, history and exam, documentation and further activities per the note including lab review, multidisciplinary communication, counseling the patient and/or coordinating care regarding acute hyper/hypoglycemic management, as well as discharge management and planning/communication.  See note for details.      Please notify inpatient diabetes service if changes are planned to steroids, nutrition, or if procedures are planned requiring prolonged NPO status. Diabetes Management Team job code: [phone]

## 2024-10-28 NOTE — PROVIDER NOTIFICATION
Colon rectal team paged    Pt reports abdominal pain. Administered Oxy and gave cold pack, but pt reports no relieve?    Sebas George RN on 10/28/2024 at 1:32 AM

## 2024-10-28 NOTE — PLAN OF CARE
"Goal Outcome Evaluation:    Time    /66 (BP Location: Right arm)   Pulse 90   Temp 97.9  F (36.6  C) (Oral)   Resp 18   Ht 1.727 m (5' 8\")   Wt 61.1 kg (134 lb 9.6 oz)   SpO2 97%   BMI 20.47 kg/m      Reason for admission: ex lap, BAUDILIO, SBR, revision of J tube and TAC with ileorectal anastomosis   Activity: Steady with IV  Pain: 8/10 abdominal pain, managed with Oxy, Tylenol.  Neuro: alert and oriented. Reports anxiety, administered Atarax x 2  Cardiac: wnl, denies chest pain  Respiratory: denies SOB, no distress observed, on RA  GI/: Ostomy bag, G tube  Diet: cycled TPN and Lipids  Lines: CVC DL  Wounds: Midline incision with staple  Labs/imaging:  please review lab in the chart      New changes this shift:     Plan:       Continue to monitor and follow POC    "

## 2024-10-29 ENCOUNTER — TELEPHONE (OUTPATIENT)
Dept: INTERNAL MEDICINE | Facility: CLINIC | Age: 59
End: 2024-10-29
Payer: COMMERCIAL

## 2024-10-29 ENCOUNTER — TRANSFERRED RECORDS (OUTPATIENT)
Dept: HEALTH INFORMATION MANAGEMENT | Facility: CLINIC | Age: 59
End: 2024-10-29

## 2024-10-29 PROCEDURE — E1399 DURABLE MEDICAL EQUIPMENT MI: HCPCS

## 2024-10-29 PROCEDURE — 99601 HOME NFS VISIT <2 HRS: CPT

## 2024-10-29 PROCEDURE — A4245 ALCOHOL WIPES PER BOX: HCPCS

## 2024-10-29 NOTE — TELEPHONE ENCOUNTER
Patient confirmed scheduled appointment:  Date: 10/31/2024  Time: 3:00 pm   Visit type: Follow up / Hosp follow up  Provider: Dr. Flynn (soonest available)  Additional notes: Med management/pain management per pt

## 2024-10-29 NOTE — TELEPHONE ENCOUNTER
Patient confirmed scheduled appointment:  Date: 10/31/2024  Time: 3:00 pm   Visit type: UMP RETURN  Provider: Dr. Flynn  Additional notes: post hospital follow up med/pain management

## 2024-10-29 NOTE — OP NOTE
SURGEON: Kaylene Branch MD   ASSISTANT: Annette Gamez MD, Colorectal Surgery fellow.   PREOPERATIVE DIAGNOSIS: Slow transit constipation   POSTOPERATIVE DIAGNOSIS: Slow transit constipation, extensive adhesions..   PROCEDURES: Exploratory laparotomy, extensive lysis of adhesions, revision of J-tube and small bowel resection, total abdominal colectomy with ileorectal anastomosis and loop ileostomy. Flexible sigmoidoscopy.  Please also add a Modifier 22: This case was extremely difficult due to the very large amount of involved viscera and induration from previous J-tube with perforation and significant adhesions such that retraction, exposure, and identification of appropriate tissue plains difficult.  This case was at least 50% more difficult and took 2 hours longer than typical for a similar case.  FINDINGS: Dense adhesions throughout the abdomen. Total abdominal colectomy. Enterotomy at site of prior J-tube, small bowel resection with hand sewn anastomosis. Replacement of J-tube (14 Fr in the left abdomen) and has prior G-tube in the right upper abdomen.  J tube has 7mL sterile water in the balloon. Ileorectal anastomosis with diverting loop ileostomy. Significant scarring posteriorly from the previous total pancreatectomy with islet auto transplantation, making the colon to have foreshortened mesentery.   INDICATIONS:  Dinora Mcghee is a 59 year old female with a complex medical and surgical history including total pancreatectomy with islet auto transplantation, severe gastroparesis, gastrostomy tube present (for venting), jejunostomy tube dependent (for tube feeds) referred for follow up of medically refractive constipation due to colonic dysmotility.. The risks and benefits of surgery were thoroughly discussed with the patient and she agreed to proceed.  DESCRIPTION OF PROCEDURE: The patient was brought to the operating room, placed supine on the operating table with a pink pad in place. General  endotracheal anesthesia was induced. Piedra catheter was placed. She was placed in modified lithotomy position with Yellofin stirrups can carefully secured to the table. The abdomen was prepped and draped in the usual sterile fashion. We began the procedure with a timeout and placed a periumbilical port under direct visualization with a Pringle technique (12 mm). There was clear adhesions so the decision was made to perform the procedure in an open fashion placing a midline incision and carefully lysing adhesions throughout the abdomen. The adhesions were very dense heading to the left upper quadrant. The small bowel was carefully lysed to clear away the small bowel.throughout the abdomen. In this process, the J tube was intimately involved. This eventually required takedown of the Jtube site and a small bowel resection. We opted to divide mesentery with the Ligature and created the anastomosis with a handsewn 2 layer anastomosis with an outer interrupted layer of silk and an inner layer of 3-0 Vicryl which was continuous. We performed a lateral dissection of the left colon, opening the retroperitoneal plane and identifying the ureter. This then was done on the right side. The dissection was then done laterally taking sigmoid, descending attachments, followed by the splenic flexure and getting into the lesser sac and dividing the transverse colon from the omentum. After this was carried fully to the right side of the transverse colon, we proceed to take mesentery which was foreshortened and clearly a reoperative procedure. The descending colon mesentery followed by the splenic flexure and most of the left transverse colon mesentery was divided. Satisfied with this, we proceeded with the right colon dissection, identifying the ileocolic vessels, defining the retroperitoneal plane, pushing the duodenum away, and extending the dissection to the right upper quadrant and right lateral abdomen. This was tedious due to the  reoperative nature.  The ileocolic vessels were isolated and taken with the ligature device. The lateral dissection was performed on the ascending colon and hepatic flexure in right upper quadrant, and then the ileal attachments were divided. At this point, we had only the mesentery of the hepatic flexure and part of the transverse colon left to divide. There were taken successfully with good hemostasis, staying clear of the duodenum. The distal end was divided with a Contour stapler and the proximal end with a SIRI blue load stapler. The j-tube was carefully created just distal to the handsewn anastomosis carefully assessing orientation. The balloon was filled to 7 ml. A pursestring suture was placed around the J-tube, and the small bowel loop was carefully secured to the anterior abdominal wall. . We then proceeded with our ileorectal anastomosis with a side-to-end configuration (Baker) using a signal firing of the EEA introducing the Anvil in the side of the antimesenteric side of the small bowel and a SIRI blue load  at the end oft the small bowel. The orientation of the ileum was confirmed. The EEA was introduced into the rectum and the Anvil with the small bowel was mated. . A flexible sigmoidoscopy was performed and anastomosis was submerged and the ileum proximally occluded. This demonstrated a health anastomosis with no bleeding which was airtight.  A diverting loop ileostomy was performed so as to ensure that the anastomosis would have a higher chance of healing without problem. We carefully traced the small intestine approximately another 20 cm proximally and brought up our loop through the preoperatively marked stoma site. The abdomen was irrigated out with 2 liters of sterile water.. Once the ileum was up, the exteriorization site fascia was closed using #1 looped PDS suture.  Subcutaneous tissue was irrigated out and closed. The loop ileostomy was matured in a Rachel fashion using 3-0 Vicryls and a stoma  appliance placed. The J-tube was secured to the abdominal wall anteriorly. The patient was emerged from general endotracheal anesthesia, taken postoperative anesthesia care unit in good condition. All instruments and sponge counts were correct x2 at the end the case.   SPECIMEN: omentum, colon and ileum, Jejunum and J-tube site.    Kaylene Branch MD  Colon and Rectal Surgery Attending  Department of Surgery  [facility]

## 2024-10-30 ENCOUNTER — PREP FOR PROCEDURE (OUTPATIENT)
Dept: GASTROENTEROLOGY | Facility: CLINIC | Age: 59
End: 2024-10-30
Payer: COMMERCIAL

## 2024-10-30 ENCOUNTER — PATIENT OUTREACH (OUTPATIENT)
Dept: GASTROENTEROLOGY | Facility: CLINIC | Age: 59
End: 2024-10-30
Payer: COMMERCIAL

## 2024-10-30 ENCOUNTER — PATIENT OUTREACH (OUTPATIENT)
Dept: SURGERY | Facility: CLINIC | Age: 59
End: 2024-10-30
Payer: COMMERCIAL

## 2024-10-30 DIAGNOSIS — K94.20 PROBLEM WITH GASTROSTOMY TUBE (H): Primary | ICD-10-CM

## 2024-10-30 DIAGNOSIS — K94.19 PAIN OF JEJUNOSTOMY TUBE SITE (H): ICD-10-CM

## 2024-10-30 NOTE — PROGRESS NOTES
Please assist in scheduling:     Procedure/Imaging/Clinic: Replacement percutaneous gastrostomy tube and replacement percutaneous jejunostomy tube  Physician: Xavier  Timing: 10/31/24  Scope time needed: Provider average  Anesthesia:MAC  Dx: Problem with gastrostomy tube; pain of jejunostomy tube site  Tier:3  Location: [facility]  OK to schedule while attending: Yes  Header of letter for pt communication: PEG and PEJ tube exchange

## 2024-10-30 NOTE — PROGRESS NOTES
Per Dr. Park, please arrange PEG and PEJ tube exchange in OR with MAC. Goal date 10/31/24.     Please assist in scheduling:     Procedure/Imaging/Clinic: Replacement percutaneous gastrostomy tube and replacement percutaneous jejunostomy tube  Physician: Xavier  Timing: 10/31/24  Scope time needed: Provider average  Anesthesia:MAC  Dx: Problem with gastrostomy tube; pain of jejunostomy tube site  Tier:3  Location: [facility]  OK to schedule while attending: Yes  Header of letter for pt communication: PEG and PEJ tube exchange     Comments:   Called to discuss with patient. Offered 10/31/24; patient has several appointments scheduled for 10/31. She requests 11/1 if possible, but is agreeable to 10/31. Message sent to OR .     Patient will need a , someone to stay with them for 24 hours and should stay in town for 24 hours (within 45 min of Hospital) post procedure.    Patient will need a pre-op physical within 30 days of procedure. If outside [facility] system, will need physical faxed to number [phone]   If you do not get a preop physical, your procedure could be cancelled, patient voiced understanding*    Preop Plan: H&P 10/11/24    Does patient have any history of gastric bypass/gastric surgery/altered panc/bili anatomy? Well known to provider    Does patient have [employer] insurance? No    Med Review    Blood thinner -  None  ASA - None  Diabetic - Novolog/Lantus   Injectable or oral medications for weight loss - None    Patient Education r/t procedure: Discussion/MyChart    A pre-op nurse will call 1-2 days prior to the procedure.    NPO/Prep: No solid food 8 hours prior to arrival at the hospital. Clear liquids okay until 2 hours prior to arrival.     Verbalized understanding of all instructions. All questions answered. Clinic contact and scheduling numbers verified for future questions/concerns. Message routed to OR scheduling.     Lesvia Mosley RN, BSN  Care Coordinator  [facility]

## 2024-10-30 NOTE — PROGRESS NOTES
Please assist in scheduling:     Procedure/Imaging/Clinic: Replacement percutaneous gastrostomy tube and replacement percutaneous jejunostomy tube  Physician: Xavier  Timing: 10/31/24  Scope time needed: Provider average  Anesthesia:MAC  Dx: Problem with gastrostomy tube; pain of jejunostomy tube site  Tier:3  Location: [facility]  OK to schedule while attending: Yes  Header of letter for pt communication: PEG and PEJ tube exchange

## 2024-10-30 NOTE — CONFIDENTIAL NOTE
Patient is s/p TAC with ileorectal anastomosis with DLI.  Had a pelvic fluid collection attempted IR drainage, but no fluid collection during procedure   Admitted 10/11 and discharged on 10/28.    Pain is well controlled with oxycodone, tylenol, robaxin, flexeril, atarax   Patient is eating and drinking normally. Patient is on a TPN/Regular (air fried chicken strips, drinking protein drink, PB crackers diet).  Encouraged patient to drink 8-10 glasses of water a day.   Patient has been recording daily ileostomy outputs. Patient has recorded outputs these were; 950mL q24 hours, today so far 950mL today. Advised patient to contact the clinic if outputs are more than 1000 mL daily for two consecutive days or more than 2000 mL in one day or less than 500 mL for two consecutive days.- she is running LR (1L today at home)  Patient does not require supplies.  Incentive spirometer, Ostomy Supplies, etc.  Patient is voiding normally and urine is light in color.  Patient is set up with home care. Patient reports being contacted by the home care service. Patient has been seen by someone from home care.   Patient's pathology was not reviewed with them.   Patient Denies nausea and vomiting.  Patient Denies any fevers or chills.  Patient's incision is CDI. Patient reminded NOT to remove any dressings over their incisions.   Patient is on a activity restriction. Lifting 10 pounds for 6 weeks.   Patient Denies needing any forms completed.   Follow up is set up with Dr. Kaylene Branch on 11/20/24.  Encouraged the patient to contact the clinic in the meantime with any fevers, chills, nausea, vomiting, increased colostomy output, no colostomy output, dizziness, lightheadedness, uncontrolled pain, changes to the incisions, or with any questions or concerns.  J tube completely came out - Dr Park is going to replace this   G tube- one of the gaskets has broken and it leaks - Dr Park is going to replace this tomorrow  Keeping  food journal.      Patient's questions were answered to their stated satisfaction and they are in agreement with this plan.    MONICA Yadav [phone]  Colon & Rectal Surgery Clinic  [facility]

## 2024-10-31 ENCOUNTER — ANESTHESIA EVENT (OUTPATIENT)
Dept: SURGERY | Facility: CLINIC | Age: 59
End: 2024-10-31
Payer: COMMERCIAL

## 2024-10-31 ENCOUNTER — TELEPHONE (OUTPATIENT)
Dept: INTERNAL MEDICINE | Facility: CLINIC | Age: 59
End: 2024-10-31

## 2024-10-31 ENCOUNTER — APPOINTMENT (OUTPATIENT)
Dept: GENERAL RADIOLOGY | Facility: CLINIC | Age: 59
End: 2024-10-31
Attending: INTERNAL MEDICINE
Payer: COMMERCIAL

## 2024-10-31 ENCOUNTER — ANESTHESIA (OUTPATIENT)
Dept: SURGERY | Facility: CLINIC | Age: 59
End: 2024-10-31
Payer: COMMERCIAL

## 2024-10-31 ENCOUNTER — HOSPITAL ENCOUNTER (OUTPATIENT)
Facility: CLINIC | Age: 59
Discharge: HOME | End: 2024-10-31
Attending: INTERNAL MEDICINE | Admitting: INTERNAL MEDICINE
Payer: COMMERCIAL

## 2024-10-31 ENCOUNTER — PATIENT OUTREACH (OUTPATIENT)
Dept: CARE COORDINATION | Facility: CLINIC | Age: 59
End: 2024-10-31

## 2024-10-31 VITALS
HEART RATE: 98 BPM | TEMPERATURE: 98.1 F | BODY MASS INDEX: 20.95 KG/M2 | WEIGHT: 138.23 LBS | HEIGHT: 68 IN | RESPIRATION RATE: 14 BRPM | OXYGEN SATURATION: 96 % | SYSTOLIC BLOOD PRESSURE: 98 MMHG | DIASTOLIC BLOOD PRESSURE: 55 MMHG

## 2024-10-31 DIAGNOSIS — G89.18 ACUTE POST-OPERATIVE PAIN: ICD-10-CM

## 2024-10-31 LAB
GLUCOSE BLDC GLUCOMTR-MCNC: 137 MG/DL (ref 70–99)
GLUCOSE BLDC GLUCOMTR-MCNC: 143 MG/DL (ref 70–99)

## 2024-10-31 PROCEDURE — 258N000003 HC RX IP 258 OP 636: Performed by: ANESTHESIOLOGY

## 2024-10-31 PROCEDURE — 82962 GLUCOSE BLOOD TEST: CPT

## 2024-10-31 PROCEDURE — C1769 GUIDE WIRE: HCPCS | Performed by: INTERNAL MEDICINE

## 2024-10-31 PROCEDURE — 710N000012 HC RECOVERY PHASE 2, PER MINUTE: Performed by: INTERNAL MEDICINE

## 2024-10-31 PROCEDURE — 999N000179 XR SURGERY CARM FLUORO LESS THAN 5 MIN W STILLS: Mod: TC

## 2024-10-31 PROCEDURE — 710N000010 HC RECOVERY PHASE 1, LEVEL 2, PER MIN: Performed by: INTERNAL MEDICINE

## 2024-10-31 PROCEDURE — 272N000001 HC OR GENERAL SUPPLY STERILE: Performed by: INTERNAL MEDICINE

## 2024-10-31 PROCEDURE — 43762 RPLC GTUBE NO REVJ TRC: CPT | Performed by: ANESTHESIOLOGY

## 2024-10-31 PROCEDURE — 255N000002 HC RX 255 OP 636: Performed by: INTERNAL MEDICINE

## 2024-10-31 PROCEDURE — 999N000141 HC STATISTIC PRE-PROCEDURE NURSING ASSESSMENT: Performed by: INTERNAL MEDICINE

## 2024-10-31 PROCEDURE — 250N000011 HC RX IP 250 OP 636: Performed by: ANESTHESIOLOGY

## 2024-10-31 PROCEDURE — 370N000017 HC ANESTHESIA TECHNICAL FEE, PER MIN: Performed by: INTERNAL MEDICINE

## 2024-10-31 PROCEDURE — 250N000009 HC RX 250: Performed by: ANESTHESIOLOGY

## 2024-10-31 PROCEDURE — 250N000013 HC RX MED GY IP 250 OP 250 PS 637: Performed by: INTERNAL MEDICINE

## 2024-10-31 PROCEDURE — 360N000082 HC SURGERY LEVEL 2 W/ FLUORO, PER MIN: Performed by: INTERNAL MEDICINE

## 2024-10-31 RX ORDER — HYDROXYZINE HYDROCHLORIDE 25 MG/1
25 TABLET, FILM COATED ORAL EVERY 6 HOURS PRN
Qty: 60 TABLET | Refills: 0 | Status: SHIPPED | OUTPATIENT
Start: 2024-10-31 | End: 2024-11-06

## 2024-10-31 RX ORDER — HYDROMORPHONE HCL IN WATER/PF 6 MG/30 ML
0.4 PATIENT CONTROLLED ANALGESIA SYRINGE INTRAVENOUS EVERY 5 MIN PRN
Status: DISCONTINUED | OUTPATIENT
Start: 2024-10-31 | End: 2024-10-31 | Stop reason: HOSPADM

## 2024-10-31 RX ORDER — NALOXONE HYDROCHLORIDE 0.4 MG/ML
0.2 INJECTION, SOLUTION INTRAMUSCULAR; INTRAVENOUS; SUBCUTANEOUS
Status: DISCONTINUED | OUTPATIENT
Start: 2024-10-31 | End: 2024-10-31 | Stop reason: HOSPADM

## 2024-10-31 RX ORDER — PROPOFOL 10 MG/ML
INJECTION, EMULSION INTRAVENOUS PRN
Status: DISCONTINUED | OUTPATIENT
Start: 2024-10-31 | End: 2024-10-31

## 2024-10-31 RX ORDER — FENTANYL CITRATE 50 UG/ML
INJECTION, SOLUTION INTRAMUSCULAR; INTRAVENOUS PRN
Status: DISCONTINUED | OUTPATIENT
Start: 2024-10-31 | End: 2024-10-31

## 2024-10-31 RX ORDER — PROPOFOL 10 MG/ML
INJECTION, EMULSION INTRAVENOUS CONTINUOUS PRN
Status: DISCONTINUED | OUTPATIENT
Start: 2024-10-31 | End: 2024-10-31

## 2024-10-31 RX ORDER — IOPAMIDOL 510 MG/ML
INJECTION, SOLUTION INTRAVASCULAR PRN
Status: DISCONTINUED | OUTPATIENT
Start: 2024-10-31 | End: 2024-10-31 | Stop reason: HOSPADM

## 2024-10-31 RX ORDER — FENTANYL CITRATE 50 UG/ML
25 INJECTION, SOLUTION INTRAMUSCULAR; INTRAVENOUS EVERY 5 MIN PRN
Status: DISCONTINUED | OUTPATIENT
Start: 2024-10-31 | End: 2024-10-31 | Stop reason: HOSPADM

## 2024-10-31 RX ORDER — CYCLOBENZAPRINE HCL 10 MG
5-10 TABLET ORAL EVERY 8 HOURS PRN
Qty: 60 TABLET | Refills: 0 | Status: SHIPPED | OUTPATIENT
Start: 2024-10-31 | End: 2024-11-06

## 2024-10-31 RX ORDER — OXYCODONE HYDROCHLORIDE 5 MG/1
5 TABLET ORAL
Status: DISCONTINUED | OUTPATIENT
Start: 2024-10-31 | End: 2024-10-31 | Stop reason: HOSPADM

## 2024-10-31 RX ORDER — HYDROMORPHONE HCL IN WATER/PF 6 MG/30 ML
0.2 PATIENT CONTROLLED ANALGESIA SYRINGE INTRAVENOUS EVERY 5 MIN PRN
Status: DISCONTINUED | OUTPATIENT
Start: 2024-10-31 | End: 2024-10-31 | Stop reason: HOSPADM

## 2024-10-31 RX ORDER — OXYCODONE HYDROCHLORIDE 10 MG/1
10 TABLET ORAL
Status: DISCONTINUED | OUTPATIENT
Start: 2024-10-31 | End: 2024-10-31 | Stop reason: HOSPADM

## 2024-10-31 RX ORDER — PROCHLORPERAZINE MALEATE 5 MG/1
10 TABLET ORAL EVERY 6 HOURS PRN
Status: DISCONTINUED | OUTPATIENT
Start: 2024-10-31 | End: 2024-10-31 | Stop reason: HOSPADM

## 2024-10-31 RX ORDER — SODIUM CHLORIDE, SODIUM LACTATE, POTASSIUM CHLORIDE, CALCIUM CHLORIDE 600; 310; 30; 20 MG/100ML; MG/100ML; MG/100ML; MG/100ML
INJECTION, SOLUTION INTRAVENOUS CONTINUOUS
Status: DISCONTINUED | OUTPATIENT
Start: 2024-10-31 | End: 2024-10-31 | Stop reason: HOSPADM

## 2024-10-31 RX ORDER — NALOXONE HYDROCHLORIDE 0.4 MG/ML
0.1 INJECTION, SOLUTION INTRAMUSCULAR; INTRAVENOUS; SUBCUTANEOUS
Status: DISCONTINUED | OUTPATIENT
Start: 2024-10-31 | End: 2024-10-31 | Stop reason: HOSPADM

## 2024-10-31 RX ORDER — METHOCARBAMOL 500 MG/1
500 TABLET, FILM COATED ORAL ONCE
Status: COMPLETED | OUTPATIENT
Start: 2024-10-31 | End: 2024-10-31

## 2024-10-31 RX ORDER — SODIUM CHLORIDE, SODIUM LACTATE, POTASSIUM CHLORIDE, CALCIUM CHLORIDE 600; 310; 30; 20 MG/100ML; MG/100ML; MG/100ML; MG/100ML
INJECTION, SOLUTION INTRAVENOUS CONTINUOUS PRN
Status: DISCONTINUED | OUTPATIENT
Start: 2024-10-31 | End: 2024-10-31

## 2024-10-31 RX ORDER — NALOXONE HYDROCHLORIDE 0.4 MG/ML
0.4 INJECTION, SOLUTION INTRAMUSCULAR; INTRAVENOUS; SUBCUTANEOUS
Status: DISCONTINUED | OUTPATIENT
Start: 2024-10-31 | End: 2024-10-31 | Stop reason: HOSPADM

## 2024-10-31 RX ORDER — OXYCODONE HCL 5 MG/5 ML
5-10 SOLUTION, ORAL ORAL EVERY 6 HOURS PRN
Qty: 150 ML | Refills: 0 | Status: SHIPPED | OUTPATIENT
Start: 2024-10-31

## 2024-10-31 RX ORDER — FLUMAZENIL 0.1 MG/ML
0.2 INJECTION, SOLUTION INTRAVENOUS
Status: DISCONTINUED | OUTPATIENT
Start: 2024-10-31 | End: 2024-10-31 | Stop reason: HOSPADM

## 2024-10-31 RX ORDER — FENTANYL CITRATE 50 UG/ML
50 INJECTION, SOLUTION INTRAMUSCULAR; INTRAVENOUS EVERY 5 MIN PRN
Status: DISCONTINUED | OUTPATIENT
Start: 2024-10-31 | End: 2024-10-31 | Stop reason: HOSPADM

## 2024-10-31 RX ADMIN — PHENYLEPHRINE HYDROCHLORIDE 150 MCG: 10 INJECTION INTRAVENOUS at 08:01

## 2024-10-31 RX ADMIN — MIDAZOLAM 2 MG: 1 INJECTION INTRAMUSCULAR; INTRAVENOUS at 07:53

## 2024-10-31 RX ADMIN — PHENYLEPHRINE HYDROCHLORIDE 100 MCG: 10 INJECTION INTRAVENOUS at 08:39

## 2024-10-31 RX ADMIN — Medication 100 MG: at 09:00

## 2024-10-31 RX ADMIN — DEXMEDETOMIDINE HYDROCHLORIDE 8 MCG: 100 INJECTION, SOLUTION INTRAVENOUS at 08:30

## 2024-10-31 RX ADMIN — Medication 30 MG: at 08:10

## 2024-10-31 RX ADMIN — FENTANYL CITRATE 50 MCG: 50 INJECTION INTRAMUSCULAR; INTRAVENOUS at 07:57

## 2024-10-31 RX ADMIN — FENTANYL CITRATE 50 MCG: 50 INJECTION INTRAMUSCULAR; INTRAVENOUS at 08:32

## 2024-10-31 RX ADMIN — HYDROMORPHONE HYDROCHLORIDE 0.2 MG: 0.2 INJECTION, SOLUTION INTRAMUSCULAR; INTRAVENOUS; SUBCUTANEOUS at 10:03

## 2024-10-31 RX ADMIN — SUCCINYLCHOLINE CHLORIDE 100 MG: 20 INJECTION, SOLUTION INTRAMUSCULAR; INTRAVENOUS; PARENTERAL at 08:02

## 2024-10-31 RX ADMIN — SODIUM CHLORIDE, POTASSIUM CHLORIDE, SODIUM LACTATE AND CALCIUM CHLORIDE: 600; 310; 30; 20 INJECTION, SOLUTION INTRAVENOUS at 07:54

## 2024-10-31 RX ADMIN — FENTANYL CITRATE 50 MCG: 50 INJECTION, SOLUTION INTRAMUSCULAR; INTRAVENOUS at 09:15

## 2024-10-31 RX ADMIN — FENTANYL CITRATE 50 MCG: 50 INJECTION, SOLUTION INTRAMUSCULAR; INTRAVENOUS at 09:23

## 2024-10-31 RX ADMIN — Medication 20 MG: at 08:44

## 2024-10-31 RX ADMIN — PHENYLEPHRINE HYDROCHLORIDE 150 MCG: 10 INJECTION INTRAVENOUS at 08:16

## 2024-10-31 RX ADMIN — PROPOFOL 120 MG: 10 INJECTION, EMULSION INTRAVENOUS at 08:01

## 2024-10-31 RX ADMIN — PROPOFOL 150 MCG/KG/MIN: 10 INJECTION, EMULSION INTRAVENOUS at 08:05

## 2024-10-31 RX ADMIN — Medication 100 MG: at 08:55

## 2024-10-31 RX ADMIN — Medication 10 MG: at 08:31

## 2024-10-31 RX ADMIN — DEXMEDETOMIDINE HYDROCHLORIDE 8 MCG: 100 INJECTION, SOLUTION INTRAVENOUS at 08:18

## 2024-10-31 RX ADMIN — METHOCARBAMOL 500 MG: 500 TABLET ORAL at 10:03

## 2024-10-31 ASSESSMENT — COPD QUESTIONNAIRES: COPD: 0

## 2024-10-31 ASSESSMENT — ACTIVITIES OF DAILY LIVING (ADL)
ADLS_ACUITY_SCORE: 0

## 2024-10-31 ASSESSMENT — ENCOUNTER SYMPTOMS: SEIZURES: 0

## 2024-10-31 ASSESSMENT — LIFESTYLE VARIABLES: TOBACCO_USE: 0

## 2024-10-31 NOTE — ANESTHESIA POSTPROCEDURE EVALUATION
Patient: Dinora Mcghee    Procedure: Procedure(s):  REPLACEMENT, GASTROSTOMY TUBE, PERCUTANEOUS  REPLACEMENT, JEJUNOSTOMY TUBE, PERCUTANEOUS  Esophagoscopy, gastroscopy, duodenoscopy (EGD), combined       Anesthesia Type:  General    Note:  Disposition: Outpatient   Postop Pain Control: Uneventful            Sign Out: Well controlled pain   PONV: No   Neuro/Psych: Uneventful            Sign Out: Acceptable/Baseline neuro status   Airway/Respiratory: Uneventful            Sign Out: Acceptable/Baseline resp. status   CV/Hemodynamics: Uneventful            Sign Out: Acceptable CV status; No obvious hypovolemia; No obvious fluid overload   Other NRE: NONE   DID A NON-ROUTINE EVENT OCCUR? No           Last vitals:  Vitals Value Taken Time   BP 99/61 10/31/24 0945   Temp 36.7  C (98.1  F) 10/31/24 0945   Pulse 97 10/31/24 0949   Resp 12 10/31/24 0949   SpO2 96 % 10/31/24 0949   Vitals shown include unfiled device data.    Electronically Signed By: Alvaro Fontenot MD  October 31, 2024  9:50 AM

## 2024-10-31 NOTE — DISCHARGE INSTRUCTIONS
Contacting your Doctor -   To contact a doctor, call   [phone] and ask for the resident on call for Gastroenterology (answered 24 hours a day)   Emergency Department:  [facility]: [phone]  [facility] [place]: [phone] 911 if you are in need of immediate or emergent help   Recommendations:   - Observe patient in PACU prior to anticipated home discharge   - Okay to use both G-tube and  J-tube right away after the procedure

## 2024-10-31 NOTE — H&P
Gastroenterology Pre-op History and Physical    Dinora Mcghee MRN# [id number]   Age: 59 year old YOB: 1965      Date of Surgery: 10/31/24  [facility]      Date of Exam 10/31/2024 Facility Same Day       Primary care provider: Juan Jose Gomez         Chief Complaint and/or Reason for Procedure:     60 yo F with a PMH of TPAIT with duodenojejunostomy s/p G-tube and J-tube placement, who is coming back for G-tube and J-tube exchange. She was noted to have malposition J-tube and broken G-tube.           Past Medical and Surgical History:     Past Medical History:   Diagnosis Date    Allergic rhinitis, cause unspecified     Allergy to other foods     strawberries, apples, celeries, alice, watermelon    Arthritis     left knee    Choledocholithiasis     long after cholecystectomy    Chronic abdominal pain     Chronic constipation     Chronic nausea     Chronic pancreatitis (H)     Degeneration of lumbar or lumbosacral intervertebral disc     Esophageal reflux     w/ hiatal hernia    Gastroparesis     Hiatal hernia     History of pituitary adenoma     s/p resection    Hypothyroidism     Migraines     Mild hyperlipidemia     On tube feeding diet     presence of GJ tube    Pancreatic disease     PD stricture, 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 endometriosis growth    APPENDECTOMY       SECTION  1993    COLONOSCOPY  2014    COLONOSCOPY N/A 2023    Procedure: COLONOSCOPY, WITH POLYPECTOMY AND BIOPSY;  Surgeon: Percy Chamorro MD;  Location:  GI    ENDOSCOPIC INSERTION TUBE GASTROSTOMY N/A 2021    Procedure: Gastrojejonostomy  placement with jejunopexy, PEG tube exchange;  Surgeon: Zackery Montoya MD;  [facility]    ENDOSCOPIC RETROGRADE CHOLANGIOPANCREATOGRAM N/A 06/02/2015    Procedure: ENDOSCOPIC RETROGRADE CHOLANGIOPANCREATOGRAM;  Surgeon: Mandeep Park MD;  [facility]    ENDOSCOPIC RETROGRADE CHOLANGIOPANCREATOGRAM N/A 02/09/2016    Procedure: COMBINED ENDOSCOPIC RETROGRADE CHOLANGIOPANCREATOGRAPHY, PLACE TUBE/STENT;  Surgeon: Mandeep Park MD;  [facility]    ENDOSCOPIC RETROGRADE CHOLANGIOPANCREATOGRAM N/A 03/17/2016    Procedure: COMBINED ENDOSCOPIC RETROGRADE CHOLANGIOPANCREATOGRAPHY, REMOVE FOREIGN BODY OR STENT/TUBE;  Surgeon: Mandeep Park MD;  [facility]    ENDOSCOPIC RETROGRADE CHOLANGIOPANCREATOGRAM N/A 08/02/2016    Procedure: COMBINED ENDOSCOPIC RETROGRADE CHOLANGIOPANCREATOGRAPHY, PLACE TUBE/STENT;  Surgeon: Mandeep Park MD;  [facility]    ENDOSCOPIC RETROGRADE CHOLANGIOPANCREATOGRAM N/A 08/26/2016    Procedure: COMBINED ENDOSCOPIC RETROGRADE CHOLANGIOPANCREATOGRAPHY, REMOVE FOREIGN BODY OR STENT/TUBE;  Surgeon: Mandeep Park MD;  [facility]    ENDOSCOPIC ULTRASOUND UPPER GASTROINTESTINAL TRACT (GI) N/A 10/03/2016    Procedure: ENDOSCOPIC ULTRASOUND, ESOPHAGOSCOPY / UPPER GASTROINTESTINAL TRACT (GI);  Surgeon: Guru Jose Rodas MD;  [facility]    ENT SURGERY  1989    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    ENTEROSCOPY SMALL BOWEL N/A 02/03/2022    Procedure: ENTEROSCOPY with possible fistula closure;  Surgeon: Francisco Dodson MD;  Location:  GI    ENTEROSCOPY SMALL BOWEL N/A 9/11/2024    Procedure: Upper endoscopy, gastrostomy tube placement and jejunostomy tube exchange;  Surgeon: Zackery Montoya MD;  [facility]    ESOPHAGOSCOPY, GASTROSCOPY, DUODENOSCOPY (EGD), COMBINED N/A 06/24/2015    Procedure: COMBINED ESOPHAGOSCOPY, GASTROSCOPY, DUODENOSCOPY (EGD), REMOVE FOREIGN BODY;  Surgeon: Mandeep Park  MD Eudardo;  [facility]    ESOPHAGOSCOPY, GASTROSCOPY, DUODENOSCOPY (EGD), COMBINED N/A 10/25/2015    Procedure: COMBINED ESOPHAGOSCOPY, GASTROSCOPY, DUODENOSCOPY (EGD);  Surgeon: Sammy Amaro MD;  [facility]    ESOPHAGOSCOPY, GASTROSCOPY, DUODENOSCOPY (EGD), COMBINED N/A 10/25/2015    Procedure: COMBINED ESOPHAGOSCOPY, GASTROSCOPY, DUODENOSCOPY (EGD), BIOPSY SINGLE OR MULTIPLE;  Surgeon: Sammy Amaro MD;  [facility]    ESOPHAGOSCOPY, GASTROSCOPY, DUODENOSCOPY (EGD), COMBINED N/A 01/31/2023    Procedure: ESOPHAGOGASTRODUODENOSCOPY (EGD) with peg replacement ;  Surgeon: Mandeep Park MD;  [facility]    ESOPHAGOSCOPY, GASTROSCOPY, DUODENOSCOPY (EGD), COMBINED N/A 7/24/2024    Procedure: Esophagoscopy, gastroscopy, duodenoscopy (EGD), combined;  Surgeon: Zackery Montoya MD;  [facility]    ESOPHAGOSCOPY, GASTROSCOPY, DUODENOSCOPY (EGD), DILATATION, COMBINED      EXCISE LESION TRUNK N/A 04/17/2017    Procedure: EXCISE LESION TRUNK;  Removal of Abdominal Foreign Body;  Surgeon: Nestor Phoenix MD;  [facility]     ESOPH/GAS REFLUX TEST W NASAL IMPED >1 HR N/A 11/19/2015    Procedure: ESOPHAGEAL IMPEDENCE FUNCTION TEST WITH 24 HOUR PH GREATER THAN 1 HOUR;  Surgeon: Thiago Apple MD;  [facility]    HC UGI ENDOSCOPY DIAG W BIOPSY  09/17/2008    HC UGI ENDOSCOPY DIAG W BIOPSY  09/27/2012    HC UGI ENDOSCOPY W ESOPHAGEAL DILATION BALLOON <30MM  09/17/2008    HC UGI ENDOSCOPY W EUS N/A 05/05/2015    Procedure: COMBINED ENDOSCOPIC ULTRASOUND, ESOPHAGOSCOPY, GASTROSCOPY, DUODENOSCOPY (EGD);  Surgeon: Wm Dueñas MD;  [facility]    HC WRIST ARTHROSCOP,RELEASE XVERS LIG Bilateral 12/17/2008    INJECT TRANSVERSUS ABDOMINIS PLANE (TAP) BLOCK BILATERAL Left 09/22/2016    Procedure: INJECT TRANSVERSUS ABDOMINIS PLANE (TAP) BLOCK BILATERAL;  Surgeon: Dickson Corrigan MD;  [facility]    INJECT TRIGGER POINT Bilateral 09/08/2022    Procedure:  Abdominal trigger point injection with ultrasound;  Surgeon: Monika Mahajan MD;  [facility]    INJECT TRIGGER POINT SINGLE / MULTIPLE 3 OR MORE MUSCLES Right 11/15/2022    Procedure: Trigger point injections right abdomen with ultrasound guidance;  Surgeon: Monika Mahajan MD;  Location: [facility]    IR CHEST PORT PLACEMENT < 5 YRS OF AGE  06/10/2022    IR CVC TUNNEL PLACEMENT > 5 YRS OF AGE  4/15/2024    IR PORT REMOVAL RIGHT  11/09/2023    LAPAROSCOPIC ASSISTED COLECTOMY N/A 10/11/2024    Procedure: Exploratory laparotomy, extensive lysis of adhesions, revision of Jtube and small bowel resection, TOTAL ABDOMINAL COLECTOMY WITH ILEORECTAL ANASTAMOSIS, DIVERTING LOOP ILEOSTOMY, flexible sigmoidoscopy;  Surgeon: Kaylene Branch MD;  [facility]    laparoscopic pineda  01/01/1995    LAPAROSCOPIC HERNIORRHAPHY INCISIONAL N/A 08/23/2018    Procedure: LAPAROSCOPIC HERNIORRHAPHY INCISIONAL;  Laparoscopic Incisional Hernia Repair with Symbotex Mesh Implant;  Surgeon: Nestor Phoenix MD;  [facility]    LAPAROSCOPIC PANCREATECTOMY, TRANSPLANT AUTO ISLET CELL N/A 12/28/2016    Procedure: LAPAROSCOPIC PANCREATECTOMY, TRANSPLANT AUTO ISLET CELL;  Surgeon: Nestor Phoenix MD;  [facility]    LAPAROTOMY EXPLORATORY N/A 01/31/2023    Procedure: Exploratory Laparotomy, lysis of adhesions, Perforated J-Junostomy Resection, Replace J-Junostomy site;  Surgeon: Elver Bauer MD;  [facility]    LAPAROTOMY, LYSIS ADHESIONS, COMBINED N/A 01/31/2023    Procedure: Dilatation of jejunostomy feeding tube, track with placement of jejunostomy tube with fluoroscopy;  Surgeon: Elver Bauer MD;  [facility]    PICC DOUBLE LUMEN PLACEMENT Left 11/13/2023    Basilic Vein 5F DL 43 cm    REMOVE AND REPLACE BREAST IMPLANT PROSTHESIS N/A 12/30/2022    Procedure: Exploratory Laparotomy, lysis of adhesions, small bowel resection. Placement of gastric jejunostomy for enteral feeding.;  Surgeon:  Elver Bauer MD;  [facility]    REMOVE GASTROSTOMY TUBE ADULT N/A 01/28/2022    Procedure: REMOVAL, GASTROSTOMY TUBE, ADULT;  Surgeon: Mandeep Park MD;  [facility]    REPLACE GASTROJEJUNOSTOMY TUBE, PERCUTANEOUS N/A 09/07/2021    Procedure: GASTROJEJUNOSTOMY TUBE PLACEMENT, PERCUTANEOUS, WITH GASTROPEXY;  Surgeon: Mandeep Park MD;  [facility]    REPLACE GASTROJEJUNOSTOMY TUBE, PERCUTANEOUS N/A 09/22/2021    Procedure: REPLACEMENT, GASTROJEJUNOSTOMY TUBE, PERCUTANEOUS;  Surgeon: Zackery Montoya MD;  [facility]    REPLACE GASTROJEJUNOSTOMY TUBE, PERCUTANEOUS N/A 11/22/2022    Procedure: REPLACEMENT, GASTROJEJUNOSTOMY TUBE, PERCUTANEOUS;  Surgeon: Mandeep Park MD;  [facility]    REPLACE GASTROJEJUNOSTOMY TUBE, PERCUTANEOUS N/A 12/09/2022    Procedure: REPLACEMENT, GASTROJEJUNOSTOMY TUBE, PERCUTANEOUS with ENDOSCOPIC SUTURING.;  Surgeon: Mandeep Park MD;  [facility]    REPLACE GASTROJEJUNOSTOMY TUBE, PERCUTANEOUS N/A 08/01/2023    Procedure: Replace Gastrojejunostomy Tube, Percutaneous;  Surgeon: Mandeep Park MD;  [facility]    REPLACE GASTROJEJUNOSTOMY TUBE, PERCUTANEOUS N/A 11/11/2023    Procedure: Replace Gastrojejunostomy Tube, Percutaneous;  Surgeon: Francisco Dodson MD;  [facility]    REPLACE GASTROJEJUNOSTOMY TUBE, PERCUTANEOUS N/A 12/8/2023    Procedure: Replace Gastrojejunostomy Tube, Percutaneous;  Surgeon: Mandeep Park MD;  [facility]    REPLACE JEJUNOSTOMY TUBE, PERCUTANEOUS N/A 09/10/2021    Procedure: UPPER ENDOSCOPY, REPLACEMENT OF PERCUTANEOUS GASTROJEJUNOSTOMY TUBE, T-TAG GASTROPEXY;  Surgeon: Zackery Montoya MD;  [facility]    REPLACE JEJUNOSTOMY TUBE, PERCUTANEOUS N/A 12/29/2021    Procedure: REPLACEMENT, JEJUNOSTOMY TUBE, PERCUTANEOUS;  Surgeon: Mandeep Park MD;  [facility]    REPLACE JEJUNOSTOMY TUBE, PERCUTANEOUS N/A 05/04/2023    Procedure:  REPLACEMENT, JEJUNOSTOMY TUBE, PERCUTANEOUS;  Surgeon: Mandeep Park MD;  [facility]    REPLACE JEJUNOSTOMY TUBE, PERCUTANEOUS  2023    Procedure: Replace Jejunostomy Tube, Percutaneous;  Surgeon: Mandeep Park MD;  [facility]    REPLACE JEJUNOSTOMY TUBE, PERCUTANEOUS N/A 2024    Procedure: REPLACEMENT, JEJUNOSTOMY TUBE, PERCUTANEOUS;  Surgeon: Francisco Dodson MD;  [facility]    transphenoidal pituitary resection  1990    [facility]  DELIVERY ONLY  1996    [facility]  DELIVERY ONLY  1998    repeat c section with incidental cystotomy with repair    [facility] EXCIS PITUITARY,TRANSNASAL/SEPTAL  1980    pituitary tumor removed for diabetes insipidus    [facility] TOTAL ABDOM HYSTERECTOMY  1999    w/ bilateral salpingoophorectomy             Medications (include herbals and vitamins):        Facility-Administered Medications Prior to Admission   Medication Dose Route Frequency Provider Last Rate Last Admin    Botulinum Toxin Type A (BOTOX) 200 units injection 155 Units  155 Units Intramuscular See Admin Instructions    155 Units at 08/15/24 1731    Botulinum Toxin Type A (BOTOX) 200 units injection 155 Units  155 Units Intramuscular See Admin Instructions Rachelle العلي APRN CNP   155 Units at 24 1703     Medications Prior to Admission   Medication Sig Dispense Refill Last Dose/Taking    acetaminophen (TYLENOL) 325 MG/10.15ML solution 20.3 mLs (650 mg) by Per J Tube route every 6 hours as needed for mild pain or fever 473 mL 4 10/30/2024    amoxicillin-clavulanate (AUGMENTIN) 875-125 MG tablet Take 1 tablet by mouth every 12 hours for 4 days. 8 tablet 0 10/30/2024    baclofen (LIORESAL) 10 MG tablet 1-2 tablets (10-20 mg) by Per G Tube route 3 times daily 60 tablet 1 10/30/2024    cetirizine (ZYRTEC) 10 MG tablet 1 tablet (10 mg) by Per G Tube route 2 times daily (Patient taking differently: Place 10 mg into G tube every morning.) 60  tablet 11 10/30/2024    cyclobenzaprine (FLEXERIL) 10 MG tablet Take 0.5-1 tablets (5-10 mg) by mouth every 8 hours as needed for muscle spasms. 20 tablet 0 10/30/2024    DULoxetine HCl 60 MG CSDR 1 capsule (60 mg) by Per J Tube route daily Sprinkle caps for feeding tube (Patient taking differently: Place 60 mg into J tube every evening. Sprinkle caps for feeding tube) 90 capsule 3 10/30/2024    hydrOXYzine HCl (ATARAX) 25 MG tablet Take 1 tablet (25 mg) by mouth every 6 hours as needed for other or anxiety (adjuvant pain). 20 tablet 0 10/30/2024    ibuprofen (ADVIL/MOTRIN) 400 MG tablet take 30 mls  by oral route  every 4 - 6 hours as needed   10/30/2024    insulin aspart (NOVOLOG FLEXPEN) 100 UNIT/ML pen Take 2 units with meals OR use insulin carbohydrate ratio 1 unit per 15 grams of carbohydrates three time daily with meals/snacks plus correction scale 1 unit per 50 >140 three times daily before meals and at bedtime (ok to correct >140 at bedtime since TPN is running overnight). Total daily Novolog: ~ 15 units/day 15 mL 5 10/30/2024    insulin glargine (LANTUS PEN) 100 UNIT/ML pen Inject 3 Units subcutaneously every 24 hours. Take daily at 8PM. 2 mL 0 10/30/2024    insulin regular 100 UNIT/ML injection Add to infusion 0.18 mLs (18 Units) daily. Add 1 syringe to TPN daily immediately prior to infusing. NOT for direct injection.  For use in TPN only.  Syringe contains 5 units of overfill that will remain in the needle. 64.8 mL 0 10/30/2024    levothyroxine (SYNTHROID) 25 mcg/mL SUSP Place 6 mLs (150 mcg) into J tube daily. (Patient taking differently: Place 150 mcg into J tube every morning. Triosint) 540 mL 3 10/30/2024    montelukast (SINGULAIR) 10 MG tablet Take 10 mg by mouth at bedtime.   10/30/2024    pantoprazole (PROTONIX) 40 mg IV push injection Inject 40 mg into the vein daily (with breakfast) (Patient taking differently: Inject 40 mg into the vein every morning.) 30 each 11 10/30/2024 Morning     "prochlorperazine (COMPAZINE) 10 MG tablet Take 1 tablet (10 mg) by mouth every 6 hours as needed for nausea or vomiting. 20 tablet 0 10/30/2024    promethazine (PHENERGAN) injection Add to infusion 1 mL (25 mg) every 8 hours as needed for nausea or vomiting. Draw up in a syringe and add to homepump immediately prior to infusing.  Discard remainder of vial. 1095 mL 0 Past Week    promethazine 25 mg Inject 25 mg into the vein 3 times daily   10/30/2024 at  4:00 PM    scopolamine (TRANSDERM) 1 MG/3DAYS 72 hr patch Place 1 patch onto the skin every 72 hours - Transdermal 10 patch 11 10/31/2024 Morning    traZODone (DESYREL) 50 MG tablet Take 1 tablet (50 mg) by mouth at bedtime. 7 tablet 0 10/30/2024    COMPOUNDED NON-CONTROLLED SUBSTANCE (CMPD RX) - PHARMACY TO MIX COMPOUNDED MEDICATION Administer 60 mg amitriptyline 2 mg/ml via G-J tube at bedtime (Patient taking differently: Administer 40 mg amitriptyline 2 mg/ml via G-J tube at bedtime) 900 mL 3     COMPOUNDED NON-CONTROLLED SUBSTANCE (CMPD RX) - PHARMACY TO MIX COMPOUNDED MEDICATION Administer 40 mg amitriptyline suspension (2 mg/mL) via G-J tube at bedtime. 600 mL 5     Continuous Blood Gluc Sensor (FREESTYLE LISA 3 SENSOR) MISC CHANGE EVERY 14 DAYS 2 each 11     Continuous Glucose Sensor (FREESTYLE LISA 3 SENSOR) MISC Change every 14 days. 6 each 1     Digestive Enzyme Cartridge (RELIZORB) MARCO 2 Cartridges daily (Patient taking differently: 3 Cartridges daily.) 60 each 6     diphenhydrAMINE (BENADRYL) 12.5 MG/5ML solution Take 25 mg by mouth 4 times daily as needed for other (nausea)   10/10/2024    Emergency Supply Kit, Central, Patient use for emergency only. Contents: 3 sodium chloride 0.9% flushes, 1 dressing kit, 1 microclave ext set 14\", 4 nitrile gloves (med), 6 alcohol prep pads, 1 bacitracin, 1 syringe (10 cc 20 G 1\"). Call [phone] to reorder. [id number] kit 0     estradiol (VAGIFEM) 10 MCG TABS vaginal tablet INSERT 1 TABLET(10 MCG) VAGINALLY 2 " "TIMES A WEEK 24 tablet 2 10/8/2024    famotidine (PEPCID) 40 MG/5ML suspension 2.5 mLs (20 mg) by Per J Tube route daily (Patient taking differently: Place 20 mg into J tube every evening.) 50 mL 4     glucagon 1 MG kit Give 0.1 to 0.15mg( 10-15 units on Insulin sryinge) subcutaneous  every 15 minutes PRN for hypoglycemia. Remix new kit q24hr. Needs up to 3 kit/week. 10 each 3     glucose 40 % GEL gel Take 15-30 g by mouth every 15 minutes as needed for low blood sugar 3 Tube 2     insulin syringe-needle U-100 (30G X 1/2\" 0.3 ML) 30G X 1/2\" 0.3 ML miscellaneous Use 3 syringes daily or as directed. 100 each 11     lactated ringers in 1,000 mL via CADD pump Infuse into the vein daily as needed (dehydration). Remove air via CADD pump. Infuse 1 bag(s) (1000 mL). Each bag to infuse over 2 hours. Reservoir Volume 1000 mL. Continuous rate: 500 mL/hr. 802845 mL 0     lactated ringers infusion Inject 1,000 mLs into the vein daily as needed       lidocaine (LIDODERM) 5 % patch Place onto the skin as needed. To prevent lidocaine toxicity, patient should be patch free for 12 hrs daily.       lipase-protease-amylase (CREON) 13712-64114-541910 units CPEP per EC capsule Take 3-4 capsules by mouth 3 times daily (with meals) With oral meals 150 capsule 11     meclizine (ANTIVERT) 25 MG tablet Take 25 mg by mouth 3 times daily as needed.   10/1/2024    methocarbamol (ROBAXIN) 750 MG tablet Take 1 tablet (750 mg) by mouth 4 times daily as needed for muscle spasms 120 tablet 11     miconazole (MICATIN) 2 % external powder Apply topically 2 times daily as needed for other (candidiasis). 43 g 0     Multiple Vitamin (INFUVITE ADULT) injection Add to infusion 10 mLs daily. Select 2 multivitamin vials, one of each color top. Draw up 5 mL from each vial and add to 1 TPN bag daily immediately prior to infusing. 3600 mL 0     Nutritional Supplements (BOOST HIGH PROTEIN) LIQD After above baseline labs are drawn, give: 6 mL/kg to maximum of 360 " "mL; the beverage is to be consumed within 5 minutes.  0     oxyCODONE (ROXICODONE) 5 MG/5ML solution Take 5-10 mLs (5-10 mg) by mouth or G tube every 6 hours as needed for severe pain. 150 mL 0     pantoprazole (PROTONIX) injection Inject 10 mLs (40 mg) over 5 minutes into the vein via push daily. Reconstitute vial. Draw up pantoprazole 4 mg/mL in a syringe. Discard remainder of vial. 360 each 0     parenteral nutrition - ADULT compounded formula CYCLE Infuse 1,440 mLs over 12 hours (central line) per cycle schedule. Taper up for 1 Hours. Taper down for 1 Hours. 1440 mL 1     parenteral nutrition - PTA/DISCHARGE ORDER The TPN formula will print on the After Visit Summary Report. 1 each 0     parenteral nutrition - TNA - see order for formula Infuse 1,690 mLs over 12 hours into the vein (central line) daily. TPN additives to be added prior to administration:   Infuvite-Adult 10 mL daily.   Regular Insulin 18 units daily.  Taper up for 1 Hours. Taper down for 1 Hours. Plateau rate:153.7 mL/hr.  KVO: 5 mL/hr. 557492 mL 0     prochlorperazine (COMPAZINE) 10 MG tablet Take 1 tablet (10 mg) by mouth every 6 hours as needed for nausea or vomiting 120 tablet 3     rimegepant (NURTEC) 75 MG ODT tablet Place 1 tablet (75 mg) under the tongue daily as needed for migraine Maximum of 1 tablet every 24 hours. 8 tablet 11 10/20/2024    Sharps Container (BD SHARPS ) MISC 1 Container as needed 1 each 1     silver nitrate (ARZOL SILVER NIT APPLICATORS) 75-25 % miscellaneous Apply topically as needed for wound care Apply Silver Nitrate Sticks every 2-3 days until granulation tissue resolved.  \"Roll\" tip of Silver Nitrate Q tip directly onto the granulation tissue-bulging tissue area.  Have Agency or Home Care Nurse administer this, if you prefer.  Take care not to touch any healthy tissue or skin.  The tissue will turn white and eventually black & scab off.  May repeat if needed in the future for recurrence. 30 applicator 0  "    sodium chloride 0.9% elastomeric pump Infuse 61 mLs into the vein every 8 hours as needed (for use with promethazine). Add 1 mL (25 mg) of promethazine 25 mg/mL to homepump, then add 5 mL NaCl 0.9% to homepump to flush port, immediately prior to infusing. 673798 mL 0     sodium chloride, PF, 0.9% PF flush Inject 10 mLs into the vein as needed for line flush. Flush IV before and after medication administration as directed and/or at least every 24 hours. 022259 mL 0     sodium chloride, PF, 0.9% PF flush Use 10 mLs for reconstitution daily. Add 1 syringe (10 mL) to each vial of pantoprazole 40 mg. Swirl until solution is clear. 3600 mL 0     sodium chloride, PF, 0.9% PF flush Add to infusion 5 mLs every 8 hours. Add to homepump after adding promethazine to flush port. 5400 mL 0     STATIN NOT PRESCRIBED (INTENTIONAL) Previous liver issues, risks vs benefits felt to not warrant statin.    Discussed Oct 2022 visit       SYNDROS 5 MG/ML oral soln TAKE 0.5 ML BY MOUTH 2 TIMES DAILY 60 mL 0     zinc oxide - white petrolatum (CRITIC-AID THICK MOIST BARRIER) 20-51% PSTE topical paste Apply 71 g topically every hour as needed for rash (Under J tube bumper when needed for skin protection) 170 g 0     ZOLMitriptan (ZOMIG-ZMT) 5 MG ODT Take 1 tablet (5 mg) by mouth at onset of headache for migraine Dissolve tablet in the mouth. May repeat in 2 hours. Max 2 tablets/24 hours. 12 tablet 6              Allergies:      Allergies   Allergen Reactions    Apple Juice Anaphylaxis    Corticosteroids Other (See Comments)     All oral, IV and injectable steroids are contraindicated (unless in life threatening situations) in [facility] [employer] transplant recipients. They can cause irreversible loss of islet cell function. Please contact patient's transplant care coordinator ADI Gaffney RN at [phone]/pager [phone] and/or endocrinologist prior to administration.      Depakote [Divalproex Sodium] Other (See Comments)     Chest pain     "Zithromax [Azithromycin Dihydrate] Anaphylaxis     Noted in 4/7/08 ER    Bromfenac      Other reaction(s): Headache    Codeine      Other reaction(s): Hallucinations    Darvocet [Propoxyphene N-Apap] Itching    Morphine Nausea and Vomiting and Rash    Nalbuphine Hcl Rash     RASH :nubaine    Bactrim [Sulfamethoxazole W-Trimethoprim] Other (See Comments) and Nausea and Vomiting     Severely low liver function.    Statins Other (See Comments)     Previous liver issues, risks vs benefits felt to not warrant statin.  Discussed Oct 2022 visit    Tape [Adhesive Tape] Blisters    Tramadol     Zofran [Ondansetron] Other (See Comments)     migraine    Flagyl [Metronidazole] Hives and Rash               Physical Exam:   All vitals have been reviewed  Patient Vitals for the past 8 hrs:   BP Temp Temp src Pulse Resp SpO2 Height Weight   10/31/24 0605 90/72 98.1  F (36.7  C) Oral 96 18 100 % 1.727 m (5' 8\") 62.7 kg (138 lb 3.7 oz)     No intake/output data recorded.    General: Lying in bed, in NAD  ENT: Normocephalic, atraumatic   Lungs: Normal work of breathing   Cardiovascular: Normal rate, regular rhythm   Abdomen: Soft, non-tender, G-tube and J-tube in place             Anesthetic risk and/or ASA classification:   ASA: 3    Wilbert Castellanos MD       "

## 2024-10-31 NOTE — TELEPHONE ENCOUNTER
Received a message from Dr. Park who has reviewed G and J tubes for Dinora today requesting refills of Flexeril, hydroxyzine, and oxycodone refills for her discharge.  Prescription sent; follow-up with me as scheduled 11/25.    Juan Jose Gomez MD

## 2024-10-31 NOTE — ANESTHESIA PREPROCEDURE EVALUATION
Anesthesia Pre-Procedure Evaluation    Patient: Dinora Mcghee   MRN: [id number] : 1965        Procedure : Procedure(s):  REPLACEMENT, GASTROSTOMY TUBE, PERCUTANEOUS  REPLACEMENT, JEJUNOSTOMY TUBE, PERCUTANEOUS          Past Medical History:   Diagnosis Date    Allergic rhinitis, cause unspecified     Allergy to other foods     strawberries, apples, celeries, alice, watermelon    Arthritis     left knee    Choledocholithiasis     long after cholecystectomy    Chronic abdominal pain     Chronic constipation     Chronic nausea     Chronic pancreatitis (H)     Degeneration of lumbar or lumbosacral intervertebral disc     Esophageal reflux     w/ hiatal hernia    Gastroparesis     Hiatal hernia     History of pituitary adenoma     s/p resection    Hypothyroidism     Migraines     Mild hyperlipidemia     On tube feeding diet     presence of GJ tube    Pancreatic disease     PD stricture, 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 endometriosis growth    APPENDECTOMY       SECTION  1993    COLONOSCOPY  2014    COLONOSCOPY N/A 2023    Procedure: COLONOSCOPY, WITH POLYPECTOMY AND BIOPSY;  Surgeon: Percy Chamorro MD;  [facility]    ENDOSCOPIC INSERTION TUBE GASTROSTOMY N/A 2021    Procedure: Gastrojejonostomy placement with jejunopexy, PEG tube exchange;  Surgeon: Zackery Montoya MD;  [facility]    ENDOSCOPIC RETROGRADE CHOLANGIOPANCREATOGRAM N/A 2015    Procedure: ENDOSCOPIC RETROGRADE CHOLANGIOPANCREATOGRAM;  Surgeon: Mandeep Park MD;  [facility]    ENDOSCOPIC RETROGRADE CHOLANGIOPANCREATOGRAM N/A 2016    Procedure: COMBINED  ENDOSCOPIC RETROGRADE CHOLANGIOPANCREATOGRAPHY, PLACE TUBE/STENT;  Surgeon: Mandeep Park MD;  [facility]    ENDOSCOPIC RETROGRADE CHOLANGIOPANCREATOGRAM N/A 03/17/2016    Procedure: COMBINED ENDOSCOPIC RETROGRADE CHOLANGIOPANCREATOGRAPHY, REMOVE FOREIGN BODY OR STENT/TUBE;  Surgeon: Mandeep Park MD;  [facility]    ENDOSCOPIC RETROGRADE CHOLANGIOPANCREATOGRAM N/A 08/02/2016    Procedure: COMBINED ENDOSCOPIC RETROGRADE CHOLANGIOPANCREATOGRAPHY, PLACE TUBE/STENT;  Surgeon: Mandeep Park MD;  [facility]    ENDOSCOPIC RETROGRADE CHOLANGIOPANCREATOGRAM N/A 08/26/2016    Procedure: COMBINED ENDOSCOPIC RETROGRADE CHOLANGIOPANCREATOGRAPHY, REMOVE FOREIGN BODY OR STENT/TUBE;  Surgeon: Mandeep Park MD;  [facility]    ENDOSCOPIC ULTRASOUND UPPER GASTROINTESTINAL TRACT (GI) N/A 10/03/2016    Procedure: ENDOSCOPIC ULTRASOUND, ESOPHAGOSCOPY / UPPER GASTROINTESTINAL TRACT (GI);  Surgeon: Guru Jose Rodas MD;  Location:  OR    ENT SURGERY  1989    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    ENTEROSCOPY SMALL BOWEL N/A 02/03/2022    Procedure: ENTEROSCOPY with possible fistula closure;  Surgeon: Francisco Dodson MD;  Location:  GI    ENTEROSCOPY SMALL BOWEL N/A 9/11/2024    Procedure: Upper endoscopy, gastrostomy tube placement and jejunostomy tube exchange;  Surgeon: Zackery Montoya MD;  Location:  OR    ESOPHAGOSCOPY, GASTROSCOPY, DUODENOSCOPY (EGD), COMBINED N/A 06/24/2015    Procedure: COMBINED ESOPHAGOSCOPY, GASTROSCOPY, DUODENOSCOPY (EGD), REMOVE FOREIGN BODY;  Surgeon: Mandeep Park MD;  Location:  GI    ESOPHAGOSCOPY, GASTROSCOPY, DUODENOSCOPY (EGD), COMBINED N/A 10/25/2015    Procedure: COMBINED ESOPHAGOSCOPY, GASTROSCOPY, DUODENOSCOPY (EGD);  Surgeon: Sammy Amaro MD;  Location:  GI    ESOPHAGOSCOPY, GASTROSCOPY, DUODENOSCOPY (EGD), COMBINED N/A 10/25/2015    Procedure: COMBINED ESOPHAGOSCOPY, GASTROSCOPY,  DUODENOSCOPY (EGD), BIOPSY SINGLE OR MULTIPLE;  Surgeon: Sammy Amaro MD;  [facility]    ESOPHAGOSCOPY, GASTROSCOPY, DUODENOSCOPY (EGD), COMBINED N/A 01/31/2023    Procedure: ESOPHAGOGASTRODUODENOSCOPY (EGD) with peg replacement ;  Surgeon: Mandeep Park MD;  [facility]    ESOPHAGOSCOPY, GASTROSCOPY, DUODENOSCOPY (EGD), COMBINED N/A 7/24/2024    Procedure: Esophagoscopy, gastroscopy, duodenoscopy (EGD), combined;  Surgeon: Zackery Montoya MD;  [facility]    ESOPHAGOSCOPY, GASTROSCOPY, DUODENOSCOPY (EGD), DILATATION, COMBINED      EXCISE LESION TRUNK N/A 04/17/2017    Procedure: EXCISE LESION TRUNK;  Removal of Abdominal Foreign Body;  Surgeon: Nestor Phoenix MD;  [facility]    HC ESOPH/GAS REFLUX TEST W NASAL IMPED >1 HR N/A 11/19/2015    Procedure: ESOPHAGEAL IMPEDENCE FUNCTION TEST WITH 24 HOUR PH GREATER THAN 1 HOUR;  Surgeon: Thiago Apple MD;  [facility]    HC UGI ENDOSCOPY DIAG W BIOPSY  09/17/2008    HC UGI ENDOSCOPY DIAG W BIOPSY  09/27/2012    HC UGI ENDOSCOPY W ESOPHAGEAL DILATION BALLOON <30MM  09/17/2008    HC UGI ENDOSCOPY W EUS N/A 05/05/2015    Procedure: COMBINED ENDOSCOPIC ULTRASOUND, ESOPHAGOSCOPY, GASTROSCOPY, DUODENOSCOPY (EGD);  Surgeon: Wm Dueñas MD;  [facility]    HC WRIST ARTHROSCOP,RELEASE XVERS LIG Bilateral 12/17/2008    INJECT TRANSVERSUS ABDOMINIS PLANE (TAP) BLOCK BILATERAL Left 09/22/2016    Procedure: INJECT TRANSVERSUS ABDOMINIS PLANE (TAP) BLOCK BILATERAL;  Surgeon: Dickson Corrigan MD;  [facility]    INJECT TRIGGER POINT Bilateral 09/08/2022    Procedure: Abdominal trigger point injection with ultrasound;  Surgeon: Monika Mahajan MD;  [facility]    INJECT TRIGGER POINT SINGLE / MULTIPLE 3 OR MORE MUSCLES Right 11/15/2022    Procedure: Trigger point injections right abdomen with ultrasound guidance;  Surgeon: Monika Mahajan MD;  Location: [facility]    IR CHEST PORT PLACEMENT < 5 YRS OF AGE   06/10/2022    IR CVC TUNNEL PLACEMENT > 5 YRS OF AGE  4/15/2024    IR PORT REMOVAL RIGHT  11/09/2023    LAPAROSCOPIC ASSISTED COLECTOMY N/A 10/11/2024    Procedure: Exploratory laparotomy, extensive lysis of adhesions, revision of Jtube and small bowel resection, TOTAL ABDOMINAL COLECTOMY WITH ILEORECTAL ANASTAMOSIS, DIVERTING LOOP ILEOSTOMY, flexible sigmoidoscopy;  Surgeon: Kaylene Branch MD;  [facility]    laparoscopic pineda  01/01/1995    LAPAROSCOPIC HERNIORRHAPHY INCISIONAL N/A 08/23/2018    Procedure: LAPAROSCOPIC HERNIORRHAPHY INCISIONAL;  Laparoscopic Incisional Hernia Repair with Symbotex Mesh Implant;  Surgeon: Nestor Phoenix MD;  [facility]    LAPAROSCOPIC PANCREATECTOMY, TRANSPLANT AUTO ISLET CELL N/A 12/28/2016    Procedure: LAPAROSCOPIC PANCREATECTOMY, TRANSPLANT AUTO ISLET CELL;  Surgeon: Nestor Phoenix MD;  [facility]    LAPAROTOMY EXPLORATORY N/A 01/31/2023    Procedure: Exploratory Laparotomy, lysis of adhesions, Perforated J-Junostomy Resection, Replace J-Junostomy site;  Surgeon: Elver Bauer MD;  [facility]    LAPAROTOMY, LYSIS ADHESIONS, COMBINED N/A 01/31/2023    Procedure: Dilatation of jejunostomy feeding tube, track with placement of jejunostomy tube with fluoroscopy;  Surgeon: Elver Bauer MD;  [facility]    PICC DOUBLE LUMEN PLACEMENT Left 11/13/2023    Basilic Vein 5F DL 43 cm    REMOVE AND REPLACE BREAST IMPLANT PROSTHESIS N/A 12/30/2022    Procedure: Exploratory Laparotomy, lysis of adhesions, small bowel resection. Placement of gastric jejunostomy for enteral feeding.;  Surgeon: Elver Bauer MD;  [facility]    REMOVE GASTROSTOMY TUBE ADULT N/A 01/28/2022    Procedure: REMOVAL, GASTROSTOMY TUBE, ADULT;  Surgeon: Mandeep Park MD;  [facility]    REPLACE GASTROJEJUNOSTOMY TUBE, PERCUTANEOUS N/A 09/07/2021    Procedure: GASTROJEJUNOSTOMY TUBE PLACEMENT, PERCUTANEOUS, WITH GASTROPEXY;  Surgeon:  Mandeep Park MD;  [facility]    REPLACE GASTROJEJUNOSTOMY TUBE, PERCUTANEOUS N/A 09/22/2021    Procedure: REPLACEMENT, GASTROJEJUNOSTOMY TUBE, PERCUTANEOUS;  Surgeon: Zackery Montoya MD;  [facility]    REPLACE GASTROJEJUNOSTOMY TUBE, PERCUTANEOUS N/A 11/22/2022    Procedure: REPLACEMENT, GASTROJEJUNOSTOMY TUBE, PERCUTANEOUS;  Surgeon: Mandeep Park MD;  [facility]    REPLACE GASTROJEJUNOSTOMY TUBE, PERCUTANEOUS N/A 12/09/2022    Procedure: REPLACEMENT, GASTROJEJUNOSTOMY TUBE, PERCUTANEOUS with ENDOSCOPIC SUTURING.;  Surgeon: Mandeep Park MD;  [facility]    REPLACE GASTROJEJUNOSTOMY TUBE, PERCUTANEOUS N/A 08/01/2023    Procedure: Replace Gastrojejunostomy Tube, Percutaneous;  Surgeon: Mandeep Park MD;  [facility]    REPLACE GASTROJEJUNOSTOMY TUBE, PERCUTANEOUS N/A 11/11/2023    Procedure: Replace Gastrojejunostomy Tube, Percutaneous;  Surgeon: Francisco Dodson MD;  [facility]    REPLACE GASTROJEJUNOSTOMY TUBE, PERCUTANEOUS N/A 12/8/2023    Procedure: Replace Gastrojejunostomy Tube, Percutaneous;  Surgeon: Mandeep Park MD;  [facility]    REPLACE JEJUNOSTOMY TUBE, PERCUTANEOUS N/A 09/10/2021    Procedure: UPPER ENDOSCOPY, REPLACEMENT OF PERCUTANEOUS GASTROJEJUNOSTOMY TUBE, T-TAG GASTROPEXY;  Surgeon: Zackery Montoya MD;  [facility]    REPLACE JEJUNOSTOMY TUBE, PERCUTANEOUS N/A 12/29/2021    Procedure: REPLACEMENT, JEJUNOSTOMY TUBE, PERCUTANEOUS;  Surgeon: Mandeep Park MD;  [facility]    REPLACE JEJUNOSTOMY TUBE, PERCUTANEOUS N/A 05/04/2023    Procedure: REPLACEMENT, JEJUNOSTOMY TUBE, PERCUTANEOUS;  Surgeon: Mandeep Park MD;  [facility]    REPLACE JEJUNOSTOMY TUBE, PERCUTANEOUS  08/01/2023    Procedure: Replace Jejunostomy Tube, Percutaneous;  Surgeon: Mandeep Park MD;  [facility]    REPLACE JEJUNOSTOMY TUBE, PERCUTANEOUS N/A 4/16/2024    Procedure: REPLACEMENT, JEJUNOSTOMY  TUBE, PERCUTANEOUS;  Surgeon: Francisco Dodson MD;  [facility]    transphenoidal pituitary resection  1990    [facility]  DELIVERY ONLY  1996    [facility]  DELIVERY ONLY  1998    repeat c section with incidental cystotomy with repair    [facility] EXCIS PITUITARY,TRANSNASAL/SEPTAL  1980    pituitary tumor removed for diabetes insipidus    [facility] TOTAL ABDOM HYSTERECTOMY  1999    w/ bilateral salpingoophorectomy       Allergies   Allergen Reactions    Apple Juice Anaphylaxis    Corticosteroids Other (See Comments)     All oral, IV and injectable steroids are contraindicated (unless in life threatening situations) in [facility] [employer] transplant recipients. They can cause irreversible loss of islet cell function. Please contact patient's transplant care coordinator ADI Gaffney RN at [phone]/pager [phone] and/or endocrinologist prior to administration.      Depakote [Divalproex Sodium] Other (See Comments)     Chest pain    Zithromax [Azithromycin Dihydrate] Anaphylaxis     Noted in 08 ER    Bromfenac      Other reaction(s): Headache    Codeine      Other reaction(s): Hallucinations    Darvocet [Propoxyphene N-Apap] Itching    Morphine Nausea and Vomiting and Rash    Nalbuphine Hcl Rash     RASH :nubaine    Bactrim [Sulfamethoxazole W-Trimethoprim] Other (See Comments) and Nausea and Vomiting     Severely low liver function.    Statins Other (See Comments)     Previous liver issues, risks vs benefits felt to not warrant statin.  Discussed Oct 2022 visit    Tape [Adhesive Tape] Blisters    Tramadol     Zofran [Ondansetron] Other (See Comments)     migraine    Flagyl [Metronidazole] Hives and Rash      Social History     Tobacco Use    Smoking status: Former     Current packs/day: 0.00     Average packs/day: 0.5 packs/day for 6.3 years (3.2 ttl pk-yrs)     Types: Cigarettes     Start date: 1985     Quit date: 1992     Years since quittin.8    Smokeless tobacco: Not on file     Tobacco comments:     no 2nd hand   Substance Use Topics    Alcohol use: Not Currently     Alcohol/week: 3.0 - 6.0 standard drinks of alcohol     Types: 1 - 2 Glasses of wine, 1 - 2 Cans of beer, 1 - 2 Shots of liquor per week     Comment: none since IGG      Wt Readings from Last 1 Encounters:   10/31/24 62.7 kg (138 lb 3.7 oz)        Anesthesia Evaluation   Pt has had prior anesthetic. Type of anesthetic: easy airway per review.    History of anesthetic complications  - PONV.      ROS/MED HX  ENT/Pulmonary:     (+)           allergic rhinitis,                          (-) tobacco use, asthma, COPD, YOLY risk factors and recent URI   Neurologic:     (+)    no peripheral neuropathy  migraines,                       (-) no seizures, no CVA and no TIA   Cardiovascular: Comment: Denies chest pain/pressure, ALBARADO, orthopnea, dizziness, palpitations, edema.     (+) Dyslipidemia - -   -  - -                                 Previous cardiac testing   Echo: Date: 11/14/23 Results:  Interpretation Summary  No vegetation on the cardiac valves or PICC.  The PICC is in the right atrium.     Global and regional left ventricular function is normal with an EF of 55-60%.  Global right ventricular function is normal.  No significant valve abnormalities.  There is a left sided septal pouch and a small patent foramen ovale with  intermittent right to left shunt.    Stress Test:  Date: Results:    ECG Reviewed:  Date: 11/9/23 Results:  Sinus rhythm   Normal ECG     Cath:  Date: Results:      METS/Exercise Tolerance: >4 METS Comment: Walking 1.5 miles in 30 minutes on a regular basis, able to ascend multiple flights of stairs.    Hematologic:  - neg hematologic  ROS     Musculoskeletal: Comment: Severe joint pain in left thumb  (+)  arthritis,             GI/Hepatic: Comment: Slow transit constipation    Chronic pancreatitis s/p islet transplant    Gastroparesis    GJ tube malfunction since prior procedure    (+) GERD,                 (-) liver disease   Renal/Genitourinary:  - neg Renal ROS     Endo: Comment: Hx of pituitary adenoma s/p resection    Hypoglycemia    (+) type I DM,         thyroid problem, hypothyroidism,        (-) chronic steroid usage and obesity   Psychiatric/Substance Use:     (+) psychiatric history anxiety and depression  H/O chronic opiod use .     Infectious Disease:  - neg infectious disease ROS     Malignancy:  - neg malignancy ROS     Other:      (+)  , H/O Chronic Pain,         Physical Exam    Airway        Mallampati: II   TM distance: > 3 FB   Neck ROM: full   Mouth opening: > 3 cm    Respiratory Devices and Support         Dental       (+) Completely normal teeth      Cardiovascular          Rhythm and rate: normal     Pulmonary   pulmonary exam normal                OUTSIDE LABS:  CBC:   Lab Results   Component Value Date    WBC 12.2 (H) 10/28/2024    WBC 11.9 (H) 10/26/2024    HGB 8.3 (L) 10/28/2024    HGB 8.5 (L) 10/26/2024    HCT 26.8 (L) 10/28/2024    HCT 27.6 (L) 10/26/2024    PLT 1,204 (HH) 10/28/2024    PLT 1,071 (HH) 10/28/2024     BMP:   Lab Results   Component Value Date     10/28/2024     10/25/2024    POTASSIUM 4.1 10/28/2024    POTASSIUM 4.7 10/27/2024    CHLORIDE 104 10/28/2024    CHLORIDE 104 10/25/2024    CO2 25 10/28/2024    CO2 24 10/25/2024    BUN 22.4 (H) 10/28/2024    BUN 17.5 10/25/2024    CR 0.53 10/28/2024    CR 0.53 10/26/2024     (H) 10/31/2024    GLC 97 10/28/2024     COAGS:   Lab Results   Component Value Date    PTT 29 01/07/2017    INR 1.11 10/28/2024    FIBR 225 12/28/2016     POC:   Lab Results   Component Value Date    BGM 85 08/24/2018    HCG Negative 12/19/2016     HEPATIC:   Lab Results   Component Value Date    ALBUMIN 3.3 (L) 10/28/2024    PROTTOTAL 6.1 (L) 10/28/2024    ALT 49 10/28/2024    AST 37 10/28/2024     (H) 06/04/2023    ALKPHOS 229 (H) 10/28/2024    BILITOTAL 0.2 10/28/2024     OTHER:   Lab Results   Component Value Date    PH 7.49 (H)  12/28/2016    LACT 0.9 07/23/2024    A1C 5.4 07/23/2024    KASSI 8.3 (L) 10/28/2024    PHOS 4.4 10/28/2024    MAG 2.0 10/28/2024    LIPASE 6 (L) 06/04/2023    AMYLASE 45 01/23/2017    TSH 0.89 11/12/2023    T4 1.13 03/23/2018    CRP 90.0 (H) 12/29/2016    SED 10 09/16/2021       Anesthesia Plan    ASA Status:  3    NPO Status:  NPO Appropriate       Induction: RSI, Intravenous.   Maintenance: TIVA.        Consents    Anesthesia Plan(s) and associated risks, benefits, and realistic alternatives discussed. Questions answered and patient/representative(s) expressed understanding.     - Discussed:     - Discussed with:  Patient      - Extended Intubation/Ventilatory Support Discussed: No.      - Patient is DNR/DNI Status: No     Use of blood products discussed: No .     Postoperative Care    Pain management: IV analgesics.   PONV prophylaxis: Scopolamine patch, Background Propofol Infusion     Comments:    Other Comments: Severe gastric dysmotiligy, malfunctionion GJ. Favor GA with RSI over MAC. TPN last night, no TF or PO intake today. Small black coffee and water early this AM. Risk of mace, stroke, sore throat oral dental damage aspiration all discussed. Crna notified no zofran or decadron. Off buprenorphine.           Alvaro Fontenot MD    I have reviewed the pertinent notes and labs in the chart from the past 30 days and (re)examined the patient.  Any updates or changes from those notes are reflected in this note.                           # Financial/Environmental Concerns:

## 2024-10-31 NOTE — ANESTHESIA PROCEDURE NOTES
Airway       Patient location during procedure: OR       Procedure Start/Stop Times: 10/31/2024 8:05 AM  Staff -        CRNA: Shannan Gagnon APRN CRNA       Performed By: CRNA  Consent for Airway        Urgency: elective  Indications and Patient Condition       Indications for airway management: anrdez-procedural       Induction type:RSI       Mask difficulty assessment: 0 - not attempted    Final Airway Details       Final airway type: endotracheal airway       Successful airway: ETT - single  Endotracheal Airway Details        ETT size (mm): 7.0       Cuffed: yes       Successful intubation technique: direct laryngoscopy       DL Blade Type: MAC 3       Grade View of Cords: 1       Adjucts: stylet       Position: Right       Measured from: lips       Secured at (cm): 22       Bite block used: None    Post intubation assessment        Placement verified by: capnometry, equal breath sounds and chest rise        Number of attempts at approach: 1       Number of other approaches attempted: 0       Secured with: pink tape       Ease of procedure: easy       Dentition: Intact and Unchanged    Medication(s) Administered   Medication Administration Time: 10/31/2024 8:05 AM

## 2024-10-31 NOTE — PROGRESS NOTES
Clinic Care Coordination Contact    Situation: Patient chart reviewed by care coordinator.    Background: Patient identified via recently discharged list.     Assessment: Per chart review, pt had post hospital call with colon rectal surgery care coordination team.     Plan/Recommendations: RN will not complete TCM call at this time due to duplication of outreach.     DARIELA ADAMS RN on 10/31/2024 at 1:39 PM

## 2024-10-31 NOTE — BRIEF OP NOTE
[facility]    Brief Operative Note    Pre-operative diagnosis: Problem with gastrostomy tube (H) [K94.20]  Pain of jejunostomy tube site (H) [K94.19]  Post-operative diagnosis Same as pre-operative diagnosis    Procedure: REPLACEMENT, GASTROSTOMY TUBE, PERCUTANEOUS, N/A - Abdomen  REPLACEMENT, JEJUNOSTOMY TUBE, PERCUTANEOUS, N/A - Abdomen    Surgeon: Surgeons and Role:     * Mandeep Park MD - Primary  Anesthesia: General   Estimated Blood Loss: None    Drains: None  Specimens: * No specimens in log *    Findings:     - EGD with fluoroscopy and successful replacement of G-tube (16F) and J-tube (14F, cut short)    Recommendations:   - Observe patient in PACU prior to anticipated home discharge   - Okay to use both G-tube and  J-tube right away after the procedure     Complications: None.  Implants: * No implants in log *

## 2024-10-31 NOTE — ANESTHESIA CARE TRANSFER NOTE
Patient: Dinora Mcghee    Procedure: Procedure(s):  REPLACEMENT, GASTROSTOMY TUBE, PERCUTANEOUS  REPLACEMENT, JEJUNOSTOMY TUBE, PERCUTANEOUS       Diagnosis: Problem with gastrostomy tube (H) [K94.20]  Pain of jejunostomy tube site (H) [K94.19]  Diagnosis Additional Information: No value filed.    Anesthesia Type:   General     Note:    Oropharynx: oropharynx clear of all foreign objects and spontaneously breathing  Level of Consciousness: awake  Oxygen Supplementation: nasal cannula  Level of Supplemental Oxygen (L/min / FiO2): 4  Independent Airway: airway patency satisfactory and stable  Dentition: dentition unchanged  Vital Signs Stable: post-procedure vital signs reviewed and stable  Report to RN Given: handoff report given  Patient transferred to: PACU    Handoff Report: Identifed the Patient, Identified the Reponsible Provider, Reviewed the pertinent medical history, Discussed the surgical course, Reviewed Intra-OP anesthesia mangement and issues during anesthesia, Set expectations for post-procedure period and Allowed opportunity for questions and acknowledgement of understanding      Vitals:  Vitals Value Taken Time   BP 95/59 10/31/24 0906   Temp     Pulse 107 10/31/24 0910   Resp 16 10/31/24 0910   SpO2 100 % 10/31/24 0910   Vitals shown include unfiled device data.    Electronically Signed By: NATALY Chappell CRNA  October 31, 2024  9:11 AM

## 2024-11-01 ENCOUNTER — HOME INFUSION BILLING (OUTPATIENT)
Dept: HOME HEALTH SERVICES | Facility: HOME HEALTH | Age: 59
End: 2024-11-01
Payer: COMMERCIAL

## 2024-11-01 LAB — UPPER GI ENDOSCOPY: NORMAL

## 2024-11-01 PROCEDURE — A4217 STERILE WATER/SALINE, 500 ML: HCPCS

## 2024-11-01 PROCEDURE — B4185 PARENTERAL SOL 10 GM LIPIDS: HCPCS

## 2024-11-03 PROCEDURE — B4185 PARENTERAL SOL 10 GM LIPIDS: HCPCS

## 2024-11-04 ENCOUNTER — HOME INFUSION BILLING (OUTPATIENT)
Dept: HOME HEALTH SERVICES | Facility: HOME HEALTH | Age: 59
End: 2024-11-04
Payer: COMMERCIAL

## 2024-11-04 ENCOUNTER — TRANSFERRED RECORDS (OUTPATIENT)
Dept: HEALTH INFORMATION MANAGEMENT | Facility: CLINIC | Age: 59
End: 2024-11-04
Payer: COMMERCIAL

## 2024-11-04 ENCOUNTER — PATIENT OUTREACH (OUTPATIENT)
Dept: CARE COORDINATION | Facility: CLINIC | Age: 59
End: 2024-11-04
Payer: COMMERCIAL

## 2024-11-04 PROCEDURE — 99601 HOME NFS VISIT <2 HRS: CPT

## 2024-11-04 NOTE — PROGRESS NOTES
Clinic Care Coordination Contact    Situation: Patient chart reviewed by care coordinator.    Background: Patient enrolled in Care Coordination.    Assessment: Tink message sent to patient for follow up.     Plan/Recommendations: RN will await pt's reply for further outreach.    DARIELA ADAMS RN on 11/4/2024 at 10:14 AM

## 2024-11-04 NOTE — PROGRESS NOTES
"Clinic Care Coordination Contact  Follow Up Progress Note      Assessment:   RN received the following reply on MyChart from patient:   \"I'm coming in to see her on Wednesday. I want to talk about the various meds to keep or stop. I'll let you know if I need help after that.  Thanks! Eok\"     Care Gaps:    Health Maintenance Due   Topic Date Due    MIGRAINE ACTION PLAN  Never done    MAMMO SCREENING  06/02/2024    COLORECTAL CANCER SCREENING  06/09/2024    INFLUENZA VACCINE (1) 09/01/2024    COVID-19 Vaccine (7 - 2024-25 season) 09/01/2024    TSH W/FREE T4 REFLEX  11/12/2024       Currently there are no Care Gaps.    Care Plans  Care Plan: Health Maintenance       Problem: Health Maintenance Due or Overdue       Long-Range Goal: Become up-to-date with health maintenance visit(s)       Start Date: 12/5/2023    This Visit's Progress: On track Recent Progress: On track    Priority: High    Note:     Barriers: patient has complex illness and medical care, collaborates with various specialty teams  Strengths: patient has a great relationship with providers and members of care team, keeps healthcare management organized  Patient expressed understanding of goal: yes  Action steps to achieve this goal:  1. I will attend all of my upcoming appointments with my specialist and primary care provider.   2. I will communicate with my RN CC if there are further needs I have for assistance and support with primary care.   3. I will work with my RN CC on identifying all of the goals my providers have for helping me reach my health care needs.     RN goals:  RN to look at pt's care plans from previous visit notes with providers. RN to help patient identify the items that have yet to be completed and stay on top of health care goals.   RN to collaborate with [facility] RN CC for transplant and GI.                                   Intervention/Education provided during outreach: Discussed patients plan of care. CC RN asked open " ended questions, provided support, resources, and encouragement as needed. Patient will reach out to care team sooner than planned with new questions or concerns.      Plan:   Care Coordinator will follow up in 1 month.     DARIELA ADAMS RN on 11/4/2024 at 1:34 PM

## 2024-11-05 ENCOUNTER — TELEPHONE (OUTPATIENT)
Dept: ENDOCRINOLOGY | Facility: CLINIC | Age: 59
End: 2024-11-05
Payer: COMMERCIAL

## 2024-11-05 ENCOUNTER — HOME INFUSION (OUTPATIENT)
Dept: HOME HEALTH SERVICES | Facility: HOME HEALTH | Age: 59
End: 2024-11-05
Payer: COMMERCIAL

## 2024-11-05 ENCOUNTER — HOME INFUSION BILLING (OUTPATIENT)
Dept: HOME HEALTH SERVICES | Facility: HOME HEALTH | Age: 59
End: 2024-11-05
Payer: COMMERCIAL

## 2024-11-05 DIAGNOSIS — E43 UNSPECIFIED SEVERE PROTEIN-CALORIE MALNUTRITION (H): ICD-10-CM

## 2024-11-05 DIAGNOSIS — H10.10 SEASONAL ALLERGIC CONJUNCTIVITIS: ICD-10-CM

## 2024-11-05 NOTE — TELEPHONE ENCOUNTER
Hello I was informed by my supervisor that the levermir flexpen will no longer beinging made come the beinging of the year. We are requesting a new medication be sent over in its place so there is no lasp in the medication therapy for the pt can we please get the new medication sent over please and thank you

## 2024-11-06 ENCOUNTER — VIRTUAL VISIT (OUTPATIENT)
Dept: TRANSPLANT | Facility: CLINIC | Age: 59
End: 2024-11-06

## 2024-11-06 ENCOUNTER — OFFICE VISIT (OUTPATIENT)
Dept: INTERNAL MEDICINE | Facility: CLINIC | Age: 59
End: 2024-11-06
Payer: COMMERCIAL

## 2024-11-06 ENCOUNTER — LAB (OUTPATIENT)
Dept: LAB | Facility: CLINIC | Age: 59
End: 2024-11-06
Payer: COMMERCIAL

## 2024-11-06 ENCOUNTER — HOME INFUSION (OUTPATIENT)
Dept: HOME HEALTH SERVICES | Facility: HOME HEALTH | Age: 59
End: 2024-11-06
Payer: COMMERCIAL

## 2024-11-06 VITALS
OXYGEN SATURATION: 99 % | TEMPERATURE: 97.7 F | WEIGHT: 142.4 LBS | DIASTOLIC BLOOD PRESSURE: 66 MMHG | RESPIRATION RATE: 16 BRPM | SYSTOLIC BLOOD PRESSURE: 100 MMHG | HEART RATE: 95 BPM | BODY MASS INDEX: 21.58 KG/M2 | HEIGHT: 68 IN

## 2024-11-06 DIAGNOSIS — E03.9 HYPOTHYROIDISM, UNSPECIFIED TYPE: ICD-10-CM

## 2024-11-06 DIAGNOSIS — G89.18 ACUTE POST-OPERATIVE PAIN: Primary | ICD-10-CM

## 2024-11-06 DIAGNOSIS — E10.9 TYPE 1 DIABETES MELLITUS WITHOUT COMPLICATION (H): Primary | ICD-10-CM

## 2024-11-06 DIAGNOSIS — G47.01 INSOMNIA DUE TO MEDICAL CONDITION: ICD-10-CM

## 2024-11-06 DIAGNOSIS — F41.9 ANXIETY: ICD-10-CM

## 2024-11-06 LAB — TSH SERPL DL<=0.005 MIU/L-ACNC: 6.62 UIU/ML (ref 0.3–4.2)

## 2024-11-06 PROCEDURE — 99214 OFFICE O/P EST MOD 30 MIN: CPT | Mod: 25 | Performed by: INTERNAL MEDICINE

## 2024-11-06 PROCEDURE — 90480 ADMN SARSCOV2 VAC 1/ONLY CMP: CPT | Performed by: INTERNAL MEDICINE

## 2024-11-06 PROCEDURE — 90471 IMMUNIZATION ADMIN: CPT | Performed by: INTERNAL MEDICINE

## 2024-11-06 PROCEDURE — 84443 ASSAY THYROID STIM HORMONE: CPT | Performed by: PATHOLOGY

## 2024-11-06 PROCEDURE — 91320 SARSCV2 VAC 30MCG TRS-SUC IM: CPT | Performed by: INTERNAL MEDICINE

## 2024-11-06 PROCEDURE — 84439 ASSAY OF FREE THYROXINE: CPT | Performed by: PATHOLOGY

## 2024-11-06 PROCEDURE — 90656 IIV3 VACC NO PRSV 0.5 ML IM: CPT | Performed by: INTERNAL MEDICINE

## 2024-11-06 PROCEDURE — 36415 COLL VENOUS BLD VENIPUNCTURE: CPT | Performed by: PATHOLOGY

## 2024-11-06 RX ORDER — TRAZODONE HYDROCHLORIDE 50 MG/1
50 TABLET, FILM COATED ORAL AT BEDTIME
Qty: 30 TABLET | Refills: 1 | Status: SHIPPED | OUTPATIENT
Start: 2024-11-06 | End: 2024-11-06

## 2024-11-06 RX ORDER — HYDROXYZINE HYDROCHLORIDE 25 MG/1
25 TABLET, FILM COATED ORAL EVERY 6 HOURS PRN
Qty: 60 TABLET | Refills: 0 | Status: SHIPPED | OUTPATIENT
Start: 2024-11-06 | End: 2024-11-06

## 2024-11-06 RX ORDER — TRAZODONE HYDROCHLORIDE 50 MG/1
50 TABLET, FILM COATED ORAL AT BEDTIME
Qty: 30 TABLET | Refills: 1 | Status: SHIPPED | OUTPATIENT
Start: 2024-11-06

## 2024-11-06 RX ORDER — CYCLOBENZAPRINE HCL 10 MG
5-10 TABLET ORAL EVERY 8 HOURS PRN
Qty: 60 TABLET | Refills: 4 | Status: SHIPPED | OUTPATIENT
Start: 2024-11-06

## 2024-11-06 RX ORDER — OXYCODONE HYDROCHLORIDE 5 MG/1
5-10 TABLET ORAL EVERY 6 HOURS PRN
Qty: 60 TABLET | Refills: 0 | Status: SHIPPED | OUTPATIENT
Start: 2024-11-06

## 2024-11-06 RX ORDER — HYDROXYZINE HYDROCHLORIDE 25 MG/1
25 TABLET, FILM COATED ORAL EVERY 6 HOURS PRN
Qty: 60 TABLET | Refills: 3 | Status: SHIPPED | OUTPATIENT
Start: 2024-11-06

## 2024-11-06 ASSESSMENT — ANXIETY QUESTIONNAIRES
2. NOT BEING ABLE TO STOP OR CONTROL WORRYING: MORE THAN HALF THE DAYS
3. WORRYING TOO MUCH ABOUT DIFFERENT THINGS: MORE THAN HALF THE DAYS
1. FEELING NERVOUS, ANXIOUS, OR ON EDGE: NEARLY EVERY DAY
IF YOU CHECKED OFF ANY PROBLEMS ON THIS QUESTIONNAIRE, HOW DIFFICULT HAVE THESE PROBLEMS MADE IT FOR YOU TO DO YOUR WORK, TAKE CARE OF THINGS AT HOME, OR GET ALONG WITH OTHER PEOPLE: SOMEWHAT DIFFICULT
7. FEELING AFRAID AS IF SOMETHING AWFUL MIGHT HAPPEN: SEVERAL DAYS
GAD7 TOTAL SCORE: 12
5. BEING SO RESTLESS THAT IT IS HARD TO SIT STILL: SEVERAL DAYS
GAD7 TOTAL SCORE: 12
8. IF YOU CHECKED OFF ANY PROBLEMS, HOW DIFFICULT HAVE THESE MADE IT FOR YOU TO DO YOUR WORK, TAKE CARE OF THINGS AT HOME, OR GET ALONG WITH OTHER PEOPLE?: SOMEWHAT DIFFICULT
GAD7 TOTAL SCORE: 12
4. TROUBLE RELAXING: MORE THAN HALF THE DAYS
6. BECOMING EASILY ANNOYED OR IRRITABLE: SEVERAL DAYS
7. FEELING AFRAID AS IF SOMETHING AWFUL MIGHT HAPPEN: SEVERAL DAYS

## 2024-11-06 ASSESSMENT — HEADACHE IMPACT TEST (HIT 6)
HOW OFTEN DID HEADACHS LIMIT CONCENTRATION ON WORK OR DAILY ACTIVITY: SOMETIMES
WHEN YOU HAVE HEADACHES HOW OFTEN IS THE PAIN SEVERE: VERY OFTEN
HIT6 TOTAL SCORE: 62
HOW OFTEN DO HEADACHES LIMIT YOUR DAILY ACTIVITIES: SOMETIMES
WHEN YOU HAVE A HEADACHE HOW OFTEN DO YOU WISH YOU COULD LIE DOWN: ALWAYS
HOW OFTEN HAVE YOU FELT TOO TIRED TO WORK BECAUSE OF YOUR HEADACHES: SOMETIMES
HOW OFTEN HAVE YOU FELT FED UP OR IRRITATED BECAUSE OF YOUR HEADACHES: RARELY

## 2024-11-06 ASSESSMENT — PATIENT HEALTH QUESTIONNAIRE - PHQ9
SUM OF ALL RESPONSES TO PHQ QUESTIONS 1-9: 8
SUM OF ALL RESPONSES TO PHQ QUESTIONS 1-9: 8
10. IF YOU CHECKED OFF ANY PROBLEMS, HOW DIFFICULT HAVE THESE PROBLEMS MADE IT FOR YOU TO DO YOUR WORK, TAKE CARE OF THINGS AT HOME, OR GET ALONG WITH OTHER PEOPLE: NOT DIFFICULT AT ALL

## 2024-11-06 NOTE — PROGRESS NOTES
{PROVIDER CHARTING PREFERENCE:559802}    Curtis Farias is a 59 year old, presenting for the following health issues:  Hospital F/U (Pt was in hospital 10/11 - 10/28, returned 10/31 for malpositioning for J tube, broken G tube) and Recheck Medication (Pt would like review medications)      11/6/2024     5:27 PM   Additional Questions   Roomed by brenna dow     Via the Health Maintenance questionnaire, the patient has reported the following services have been completed -[facility]ammogram: [facility]24-[facility], this information has been sent to the abstraction team.  History of Present Illness       Mental Health Follow-up:  Patient presents to follow-up on Depression & Anxiety.Patient's depression since last visit has been:  Medium  The patient is not having other symptoms associated with depression.  Patient's anxiety since last visit has been:  Bad  The patient is not having other symptoms associated with anxiety.  Any significant life events: grief or loss and health concerns  Patient is feeling anxious or having panic attacks.  Patient has no concerns about alcohol or drug use.    Diabetes:   She presents for follow up of diabetes.  She is checking home blood glucose four or more times daily.   She checks blood glucose before meals.  Blood glucose is sometimes over 200 and sometimes under 70. She is aware of hypoglycemia symptoms including shakiness, dizziness, weakness, lethargy, confusion and other.   She is concerned about low blood sugar, several less than 70 in the past few weeks.    She is not experiencing numbness or burning in feet, excessive thirst, blurry vision, weight changes or redness, sores or blisters on feet.           Hypothyroidism:     Since last visit, patient describes the following symptoms::  Anxiety, Depression and Fatigue    She eats 0-1 servings of fruits and vegetables daily.She consumes 0 sweetened beverage(s) daily.She exercises with enough effort to increase her heart rate 10 to 19 minutes  "per day.  She exercises with enough effort to increase her heart rate 4 days per week.   She is taking medications regularly.         Hospital Follow-up Visit:    Hospital/Nursing Home/IP Rehab Facility: {INPT AND SNF DISCHARGE:787733}  Date of Admission: ***  Date of Discharge: ***  Reason(s) for Admission: ***  Was the patient in the ICU or did the patient experience delirium during hospitalization?  {No_YES (delirium):819356}  Do you have any other stressors you would like to discuss with your provider? { :572286}    Problems taking medications regularly:  {:589823}  Medication changes since discharge: {:749972}  Problems adhering to non-medication therapy:  {:660016}    Summary of hospitalization:  {:676601}  Diagnostic Tests/Treatments reviewed.  Follow up needed: {:896807:}  Other Healthcare Providers Involved in Patient s Care:         {those currently involved after discharge:701241::\"None\"}  Update since discharge: {:692817} {TIP  Include information from family/caregivers, SNF, Care Coordination :953291}        Plan of care communicated with {:996756}     {Reference  Coding guidelines- Moderate Complexity F2F/Video within 7 - 14 days of discharge [id number], High Complexity F2F/Video within 7 days [id number] or [id number] days [id number] :938780}      {additonal problems for provider to add (Optional):277384}    {ROS Picklists (Optional):625001}      Objective    /66 (BP Location: Left arm, Patient Position: Sitting, Cuff Size: Adult Regular)   Pulse 95   Temp 97.7  F (36.5  C) (Oral)   Resp 16   Ht 1.727 m (5' 7.99\")   Wt 64.6 kg (142 lb 6.4 oz)   SpO2 99%   BMI 21.66 kg/m    Body mass index is 21.66 kg/m .  Physical Exam   {Exam List (Optional):434520}    {Diagnostic Test Results (Optional):009972}        Signed Electronically by: Juan Jose Gomez MD  {Email feedback regarding this note to [email or web address]   :623757}  "

## 2024-11-06 NOTE — PATIENT INSTRUCTIONS
1)  Let's keep the 3 units of Levemir until you use up the Levemir pens, then change to Semglee (glargine) at the same dose.    2) Continue Novolog 1 per 15g for now but let me know if you do start to see lows.    3)  We will follow up on Feb 20 by virtual visit.

## 2024-11-06 NOTE — LETTER
11/6/2024      Dinora Mcghee  [address] [facility] [address]      Dear Colleague,    Thank you for referring your patient, Dinora Mcghee, to the [facility]. Please see a copy of my visit note below.    Dinora is a 59 year old who is being evaluated via a billable video visit.          Video-Visit Details    Type of service:  Video Visit   Originating Location (pt. Location): Home    Distant Location (provider location):  On-site  Platform used for Video Visit: Emeli  Signed Electronically by: Gloria Melissa MD      Video start 11/6/2024, 2:30 pm.  Video End 11/6/2024, 2:50 pm.    [facility]  Islet Autotransplant, Diabetes Follow Up    Problem List:  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     Incisional hernia, without obstruction or gangrene     Adhesive capsulitis of shoulder, unspecified laterality     S/P hernia repair     Headache, chronic migraine without aura     Chronic pain syndrome     Iron deficiency anemia     Hypoglycemia     Adult failure to thrive     Abdominal pain, generalized     Gastrostomy tube obstruction (H)     Intra-abdominal abscess (H)     Postprocedural intraabdominal abscess (H)     Acquired absence of spleen     Depressive disorder     Diabetes insipidus (H)     Other specified abnormal immunological findings in serum     Poisoning by drug     Sphincter  of Oddi dysfunction     Type 1 diabetes mellitus without complication (H)     Myofascial pain     Feeding intolerance     Acute postoperative abdominal pain     Major depression, single episode     History of food intolerance     Malnutrition, unspecified type (H)     Nausea and vomiting, unspecified vomiting type     On total parenteral nutrition (TPN)     Fever, unspecified fever cause     Chronic pancreatitis, unspecified pancreatitis type (H)     Cholangitis (H)     Abdominal pain, unspecified abdominal location     Bacteremia     Constipation     Right upper quadrant abdominal pain     S/P pancreatic islet cell transplantation (H)           Assessment:  1.  Post pancreatectomy diabetes mellitus, s/p total pancreatectomy and islet autotransplant.  (ICD-10 E89.1)  2.  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   Gastroparesis  4.  Nausea, vomiting      Dinora is a 59 year old with history of chronic pancreatitis who is s/p total pancreatectomy and islet autotransplant.  She has been on and off insulin with A1c in goal range in the past, but more recently has been consistently needing low dose insulin.  Major interval changes complicating diabetes management include: recent colectomy (Oct 2024), TPN use, enteral feeds.  Overall she has excellent glycemic control and we will plan to continue the same insulin dosing with close monitoring. She does need to continue CGM (Yandy 3) due to history of hypoglycemia and day to day variability.  We did discuss insulin analogs as below as Levemir, which she has used recently, is being discontinued and she will need to eventually transition back to glargine.         Plan:  1.  Changes to current diabetes regimen:  Patient Instructions   1)  Let's keep the 3 units of Levemir until you use up the Levemir pens, then change to Semglee (glargine) at the same  dose.    2) Continue Novolog 1 per 15g for now but let me know if you do start to see lows.    3)  We will follow up on  by virtual visit.       2.  Frequency of blood sugar checks:  Keep wearing Yandy-- this is much better for Dinora than fingersticks because with fingersticks we miss the really critical data of the post prandial excursions.    3.  Continue routine follow up for autoislet transplant patients:  Mixed meal test (6 mL/kg BoostHP to max of 360 mL) at 3 months, 6 months, and once a year post transplant.  Hemoglobin A1c levels at these time points and quarterly.    4.  Other issues addressed today:  none    Follow up:  4 mos    Contact me for questions at [phone] or [phone].  Emergency number to reach pediatric endocrinology after hours is [phone].        Dr. Gloria Melissa MD  Professor, Pediatric Endocrinology  [facility]  Phone:  [phone]  Electronically signed: 2024 at 3:12 PM           Review of the result(s) of each unique test - HbA1c, yandy  30 minutes spent on the date of the encounter doing chart review, history and exam, documentation, downloading and reviewing CGM data,  and further activities per the note    The longitudinal plan of care for the diagnosis(es)/condition(s) as documented were addressed during this visit. Due to the added complexity in care, I will continue to support Dinora in the subsequent management and with ongoing continuity of care.        HPI:  Dinora is a 59 year old  female here for follow up oflaparoscopic assisted total pancreatectomy, islet cell autotransplant, splenectomy, gastrojejunostomy tube revision, choledochoduodenostomy, duodenojejunostomy, Vera-Y reconstruction performed on 2016.  At the time of the procedure, the patient received:  Total Islet number: [id number].  Total Islet number/k.  Islet equivalents: [id number]  Islet equivalents/kilogram:  4091    Post-surgical course was complicated by two minor procedures for a retained foreign body near GJ site in 4/2017 and hernia repair 8/2018, and severe gastroparesis (refractory to J-feeds, domperidone).  She was initially insulin independent at about 1 year after surgery.    - Gastroparesis and GI dysmotility is her major issue post TPIAT. Admitted multiple times. GJ was helpful for short time but did not tolerate well.  Has tried multiple other approaches and continues to worsen, nothing offering benefit.  Tried expanded access domperidone but not helpful  - Also has hypoglycemia (prior treatments SGLT2 inhibitor not helpful, acarbose is helpful when eating, also uses mini glucagon).   -- Multiple failures of GJ feeding tubes.  -- Most recently had total colectomy and ostomy for severe slow transit constipation       At today's visit,   --  Most significant interval hx was surgery on 10/10/24 with:  Exploratory laparotomy, extensive lysis of adhesions, revision of J-tube and small bowel resection, total abdominal colectomy with ileorectal anastomosis and loop ileostomy.   Remarkably she is now able to eat for the first time in quite a while. She had cheerios for breakfast, tuna salad for lunch today.  She still has trouble getting enough fluids.  She has LR at home, and is on tube feeds, and TPN.  -- Tube feeds:  Now at 25 mL/hour, hard time ramping up due to pain.  -- TPN is being decreased, going down to 3 days per week (tues, thurs, sun).  She does have 10 units of insulin in her TPN.     Diabetes history:  Current insulin regimen:  Levemir 3 units  --> on levemir to try to match up with tube feeds but levemir is coming off market.  Also has semglee at home.  Novolog 1 per 15g.     For hypoglycemia she currently uses CGM for early detection plus mini dosing of glucagon as needed.     Wearing Yandy    TIR is 95%  at              Recent hemoglobin A1c levels:      Lab Results   Component Value Date     A1C 5.4 2024    A1C 5.6 2024    A1C 5.4 2022    A1C 5.2 2021    A1C 5.5 2021    A1C 5.4 2021    A1C 5.7 2021    A1C 5.9 2021    A1C 5.5 2020    A1C 5.8 2020       Hypoglycemia history: mild lows but rapid falls. The patient has had 0 episodes of severe hypoglycemia (seizure, coma, or neuroglycopenic symptoms severe enough to require assistance from another person).  Blood sugars were reviewed from the patient records and/or the meter download.          Review of systems:  A complete ROS is negative except as noted in HPI above.      Past Medical and Surgical History:  Past Medical History:   Diagnosis Date     Allergic rhinitis, cause unspecified      Allergy to other foods     strawberries, apples, celeries, alice, watermelon     Arthritis     left knee     Choledocholithiasis     long after cholecystectomy     Chronic abdominal pain      Chronic constipation      Chronic nausea      Chronic pancreatitis (H)      Degeneration of lumbar or lumbosacral intervertebral disc      Esophageal reflux     w/ hiatal hernia     Gastroparesis      Hiatal hernia      History of pituitary adenoma     s/p resection     Hypothyroidism      Migraines      Mild hyperlipidemia      On tube feeding diet     presence of GJ tube     Pancreatic disease     PD stricture, 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      PONV (postoperative nausea and vomiting)      Portacath in place      Type 1 diabetes mellitus without complication (H) 2022     Unspecified hearing loss     25% high frequency R     Past Surgical History:   Procedure Laterality Date     ABDOMEN SURGERY  1999    c sections: 93, 96, 98. endometriosis growth     APPENDECTOMY        SECTION  1993     COLONOSCOPY  2014     COLONOSCOPY N/A 2023    Procedure: COLONOSCOPY, WITH POLYPECTOMY AND BIOPSY;  Surgeon: Percy Chamorro MD;  Location:  GI     ENDOSCOPIC INSERTION  TUBE GASTROSTOMY N/A 11/28/2021    Procedure: Gastrojejonostomy placement with jejunopexy, PEG tube exchange;  Surgeon: Zackery Montoya MD;  [facility]     ENDOSCOPIC RETROGRADE CHOLANGIOPANCREATOGRAM N/A 06/02/2015    Procedure: ENDOSCOPIC RETROGRADE CHOLANGIOPANCREATOGRAM;  Surgeon: Mandeep Park MD;  [facility]     ENDOSCOPIC RETROGRADE CHOLANGIOPANCREATOGRAM N/A 02/09/2016    Procedure: COMBINED ENDOSCOPIC RETROGRADE CHOLANGIOPANCREATOGRAPHY, PLACE TUBE/STENT;  Surgeon: Mandeep Park MD;  [facility]     ENDOSCOPIC RETROGRADE CHOLANGIOPANCREATOGRAM N/A 03/17/2016    Procedure: COMBINED ENDOSCOPIC RETROGRADE CHOLANGIOPANCREATOGRAPHY, REMOVE FOREIGN BODY OR STENT/TUBE;  Surgeon: Mandeep Park MD;  [facility]     ENDOSCOPIC RETROGRADE CHOLANGIOPANCREATOGRAM N/A 08/02/2016    Procedure: COMBINED ENDOSCOPIC RETROGRADE CHOLANGIOPANCREATOGRAPHY, PLACE TUBE/STENT;  Surgeon: Mandeep Park MD;  [facility]     ENDOSCOPIC RETROGRADE CHOLANGIOPANCREATOGRAM N/A 08/26/2016    Procedure: COMBINED ENDOSCOPIC RETROGRADE CHOLANGIOPANCREATOGRAPHY, REMOVE FOREIGN BODY OR STENT/TUBE;  Surgeon: Mandeep Park MD;  [facility]     ENDOSCOPIC ULTRASOUND UPPER GASTROINTESTINAL TRACT (GI) N/A 10/03/2016    Procedure: ENDOSCOPIC ULTRASOUND, ESOPHAGOSCOPY / UPPER GASTROINTESTINAL TRACT (GI);  Surgeon: Guru Jose Rodas MD;  [facility]     ENT SURGERY  1989    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     ENTEROSCOPY SMALL BOWEL N/A 02/03/2022    Procedure: ENTEROSCOPY with possible fistula closure;  Surgeon: Francisco Dodson MD;  Location:  GI     ENTEROSCOPY SMALL BOWEL N/A 9/11/2024    Procedure: Upper endoscopy, gastrostomy tube placement and jejunostomy tube exchange;  Surgeon: Zackery Montoya MD;  [facility]     ESOPHAGOSCOPY, GASTROSCOPY, DUODENOSCOPY (EGD), COMBINED N/A 06/24/2015    Procedure: COMBINED ESOPHAGOSCOPY,  GASTROSCOPY, DUODENOSCOPY (EGD), REMOVE FOREIGN BODY;  Surgeon: Mandeep Park MD;  [facility]     ESOPHAGOSCOPY, GASTROSCOPY, DUODENOSCOPY (EGD), COMBINED N/A 10/25/2015    Procedure: COMBINED ESOPHAGOSCOPY, GASTROSCOPY, DUODENOSCOPY (EGD);  Surgeon: Sammy Amaro MD;  [facility]     ESOPHAGOSCOPY, GASTROSCOPY, DUODENOSCOPY (EGD), COMBINED N/A 10/25/2015    Procedure: COMBINED ESOPHAGOSCOPY, GASTROSCOPY, DUODENOSCOPY (EGD), BIOPSY SINGLE OR MULTIPLE;  Surgeon: Sammy Amaro MD;  [facility]     ESOPHAGOSCOPY, GASTROSCOPY, DUODENOSCOPY (EGD), COMBINED N/A 01/31/2023    Procedure: ESOPHAGOGASTRODUODENOSCOPY (EGD) with peg replacement ;  Surgeon: Mandeep Park MD;  [facility]     ESOPHAGOSCOPY, GASTROSCOPY, DUODENOSCOPY (EGD), COMBINED N/A 7/24/2024    Procedure: Esophagoscopy, gastroscopy, duodenoscopy (EGD), combined;  Surgeon: Zackery Montoya MD;  [facility]     ESOPHAGOSCOPY, GASTROSCOPY, DUODENOSCOPY (EGD), COMBINED N/A 10/31/2024    Procedure: Esophagoscopy, gastroscopy, duodenoscopy (EGD), combined;  Surgeon: Mandeep Park MD;  [facility]     ESOPHAGOSCOPY, GASTROSCOPY, DUODENOSCOPY (EGD), DILATATION, COMBINED       EXCISE LESION TRUNK N/A 04/17/2017    Procedure: EXCISE LESION TRUNK;  Removal of Abdominal Foreign Body;  Surgeon: Nestor Phoenix MD;  [facility]     HC ESOPH/GAS REFLUX TEST W NASAL IMPED >1 HR N/A 11/19/2015    Procedure: ESOPHAGEAL IMPEDENCE FUNCTION TEST WITH 24 HOUR PH GREATER THAN 1 HOUR;  Surgeon: Thiago Apple MD;  [facility]     HC UGI ENDOSCOPY DIAG W BIOPSY  09/17/2008     HC UGI ENDOSCOPY DIAG W BIOPSY  09/27/2012     HC UGI ENDOSCOPY W ESOPHAGEAL DILATION BALLOON <30MM  09/17/2008     HC UGI ENDOSCOPY W EUS N/A 05/05/2015    Procedure: COMBINED ENDOSCOPIC ULTRASOUND, ESOPHAGOSCOPY, GASTROSCOPY, DUODENOSCOPY (EGD);  Surgeon: Wm Dueñas MD;  Location:  GI      WRIST  ARTHROSCOP,RELEASE XVERS LIG Bilateral 12/17/2008     INJECT TRANSVERSUS ABDOMINIS PLANE (TAP) BLOCK BILATERAL Left 09/22/2016    Procedure: INJECT TRANSVERSUS ABDOMINIS PLANE (TAP) BLOCK BILATERAL;  Surgeon: Dickson Corrigna MD;  [facility]     INJECT TRIGGER POINT Bilateral 09/08/2022    Procedure: Abdominal trigger point injection with ultrasound;  Surgeon: Monika Mahajan MD;  [facility]     INJECT TRIGGER POINT SINGLE / MULTIPLE 3 OR MORE MUSCLES Right 11/15/2022    Procedure: Trigger point injections right abdomen with ultrasound guidance;  Surgeon: Monika Mahajan MD;  [facility]: [facility]     IR CHEST PORT PLACEMENT < 5 YRS OF AGE  06/10/2022     IR CVC TUNNEL PLACEMENT > 5 YRS OF AGE  4/15/2024     IR PORT REMOVAL RIGHT  11/09/2023     LAPAROSCOPIC ASSISTED COLECTOMY N/A 10/11/2024    Procedure: Exploratory laparotomy, extensive lysis of adhesions, revision of Jtube and small bowel resection, TOTAL ABDOMINAL COLECTOMY WITH ILEORECTAL ANASTAMOSIS, DIVERTING LOOP ILEOSTOMY, flexible sigmoidoscopy;  Surgeon: Kaylene Branch MD;  [facility]     laparoscopic pineda  01/01/1995     LAPAROSCOPIC HERNIORRHAPHY INCISIONAL N/A 08/23/2018    Procedure: LAPAROSCOPIC HERNIORRHAPHY INCISIONAL;  Laparoscopic Incisional Hernia Repair with Symbotex Mesh Implant;  Surgeon: Nestor Phoenix MD;  [facility]     LAPAROSCOPIC PANCREATECTOMY, TRANSPLANT AUTO ISLET CELL N/A 12/28/2016    Procedure: LAPAROSCOPIC PANCREATECTOMY, TRANSPLANT AUTO ISLET CELL;  Surgeon: Nestor Phoenix MD;  [facility]     LAPAROTOMY EXPLORATORY N/A 01/31/2023    Procedure: Exploratory Laparotomy, lysis of adhesions, Perforated J-Junostomy Resection, Replace J-Junostomy site;  Surgeon: Elver Bauer MD;  [facility]     LAPAROTOMY, LYSIS ADHESIONS, COMBINED N/A 01/31/2023    Procedure: Dilatation of jejunostomy feeding tube, track with placement of jejunostomy tube with fluoroscopy;  Surgeon: Elver Bauer  MD Wale;  [facility]     PICC DOUBLE LUMEN PLACEMENT Left 11/13/2023    Basilic Vein 5F DL 43 cm     REMOVE AND REPLACE BREAST IMPLANT PROSTHESIS N/A 12/30/2022    Procedure: Exploratory Laparotomy, lysis of adhesions, small bowel resection. Placement of gastric jejunostomy for enteral feeding.;  Surgeon: Elver Bauer MD;  [facility]     REMOVE GASTROSTOMY TUBE ADULT N/A 01/28/2022    Procedure: REMOVAL, GASTROSTOMY TUBE, ADULT;  Surgeon: Mandeep Park MD;  [facility]     REPLACE GASTROJEJUNOSTOMY TUBE, PERCUTANEOUS N/A 09/07/2021    Procedure: GASTROJEJUNOSTOMY TUBE PLACEMENT, PERCUTANEOUS, WITH GASTROPEXY;  Surgeon: Mandeep Park MD;  [facility]     REPLACE GASTROJEJUNOSTOMY TUBE, PERCUTANEOUS N/A 09/22/2021    Procedure: REPLACEMENT, GASTROJEJUNOSTOMY TUBE, PERCUTANEOUS;  Surgeon: Zackery Montoya MD;  [facility]     REPLACE GASTROJEJUNOSTOMY TUBE, PERCUTANEOUS N/A 11/22/2022    Procedure: REPLACEMENT, GASTROJEJUNOSTOMY TUBE, PERCUTANEOUS;  Surgeon: Mandeep Park MD;  [facility]     REPLACE GASTROJEJUNOSTOMY TUBE, PERCUTANEOUS N/A 12/09/2022    Procedure: REPLACEMENT, GASTROJEJUNOSTOMY TUBE, PERCUTANEOUS with ENDOSCOPIC SUTURING.;  Surgeon: Mandeep Park MD;  [facility]     REPLACE GASTROJEJUNOSTOMY TUBE, PERCUTANEOUS N/A 08/01/2023    Procedure: Replace Gastrojejunostomy Tube, Percutaneous;  Surgeon: Mandeep Park MD;  [facility]     REPLACE GASTROJEJUNOSTOMY TUBE, PERCUTANEOUS N/A 11/11/2023    Procedure: Replace Gastrojejunostomy Tube, Percutaneous;  Surgeon: Francisco Dodson MD;  [facility]     REPLACE GASTROJEJUNOSTOMY TUBE, PERCUTANEOUS N/A 12/8/2023    Procedure: Replace Gastrojejunostomy Tube, Percutaneous;  Surgeon: Mandeep Park MD;  [facility]     REPLACE GASTROSTOMY TUBE, PERCUTANEOUS N/A 10/31/2024    Procedure: REPLACEMENT, GASTROSTOMY TUBE, PERCUTANEOUS;  Surgeon: Xavier  Mandeep Clark MD;  [facility]     REPLACE JEJUNOSTOMY TUBE, PERCUTANEOUS N/A 09/10/2021    Procedure: UPPER ENDOSCOPY, REPLACEMENT OF PERCUTANEOUS GASTROJEJUNOSTOMY TUBE, T-TAG GASTROPEXY;  Surgeon: Zackery Montoya MD;  [facility]     REPLACE JEJUNOSTOMY TUBE, PERCUTANEOUS N/A 2021    Procedure: REPLACEMENT, JEJUNOSTOMY TUBE, PERCUTANEOUS;  Surgeon: Mandeep Park MD;  [facility]     REPLACE JEJUNOSTOMY TUBE, PERCUTANEOUS N/A 2023    Procedure: REPLACEMENT, JEJUNOSTOMY TUBE, PERCUTANEOUS;  Surgeon: Mandeep Park MD;  [facility]     REPLACE JEJUNOSTOMY TUBE, PERCUTANEOUS  2023    Procedure: Replace Jejunostomy Tube, Percutaneous;  Surgeon: Mandeep Park MD;  [facility]     REPLACE JEJUNOSTOMY TUBE, PERCUTANEOUS N/A 2024    Procedure: REPLACEMENT, JEJUNOSTOMY TUBE, PERCUTANEOUS;  Surgeon: Francisco Dodson MD;  [facility]     REPLACE JEJUNOSTOMY TUBE, PERCUTANEOUS N/A 10/31/2024    Procedure: REPLACEMENT, JEJUNOSTOMY TUBE, PERCUTANEOUS;  Surgeon: Mandeep Park MD;  [facility]     transphenoidal pituitary resection  1990     [facility]  DELIVERY ONLY  1996     [facility]  DELIVERY ONLY  1998    repeat c section with incidental cystotomy with repair     [facility] EXCIS PITUITARY,TRANSNASAL/SEPTAL  1980    pituitary tumor removed for diabetes insipidus     [facility] TOTAL ABDOM HYSTERECTOMY  1999    w/ bilateral salpingoophorectomy        Family History:  New changes since last visit:  none  Family History   Problem Relation Age of Onset     Lipids Mother      Hypertension Mother      Thyroid Disease Mother      Depression Mother      Angina Mother      GERD Mother      Skin Cancer Mother      Migraines Mother      Autoimmune Disease Mother      Hyperlipidemia Mother      Mental Illness Mother      Cerebrovascular Disease Mother         2023     Other Cancer Mother         skin-basil cell     Anxiety  Disorder Mother      Post Operative Nausea and Vomiting Mother      Eye Disorder Father         cataract, detached retina     Myocardial Infarction Father 60     Lipids Father      Cerebrovascular Disease Father      Depression Father      Substance Abuse Father      Anesthesia Reaction Father         stroke right after surgery     Cataracts Father      Osteoarthritis Father      Ulcerative Colitis Father      Autoimmune Disease Father      Heart Disease Father      Hyperlipidemia Father      Mental Illness Father      Other Cancer Father         myloma     Anxiety Disorder Father      Interpersonal Violence Sister      Coronary Artery Disease Sister         angioplasty     GERD Sister      Substance Abuse Sister      Cerebrovascular Disease Sister         2005     Depression Sister      Thyroid Disease Sister      Autoimmune Disease Sister      Depression Sister      Mental Illness Sister      Substance Abuse Sister      Thyroid Disease Sister      Eye Disorder Maternal Grandmother         cataract     Thyroid Disease Maternal Grandmother      Diabetes Maternal Grandfather      Eye Disorder Paternal Grandmother         cataract     Diabetes Paternal Grandmother      Eye Disorder Paternal Grandfather         cataract     Diabetes Paternal Grandfather      Substance Abuse Paternal Grandfather      Eye Disorder Son         ptosis     Depression Son      Anxiety Disorder Son      Depression Son      Mental Illness Son      Anxiety Disorder Son      Heart Disease Paternal Aunt      Diabetes Paternal Aunt      Diabetes Paternal Uncle      Heart Disease Paternal Uncle      Depression Nephew      Anxiety Disorder Nephew      Thyroid Disease Nephew      Thyroid Disease Nephew      Anxiety Disorder Nephew      Diabetes Type 2  Cousin         paternal cousin     Autoimmune Disease Cousin        Social History:  Social History     Social History Narrative     with 3 children and a dog.  No smoking, etoh or drug use.   Worked as a  for [employer] in [place] in the past.  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  BMI= There is no height or weight on file to calculate BMI.  General:  Appearance is normal, no acute distress  HEENT:  NC/AT, sclera appear white  Neck:  No obvious thyromegaly  CV/Lungs:  Non distressed breathing  Skin:  No apparent rashes  Neuro:  Normal mental status  Psych:  Normal affect      Again, thank you for allowing me to participate in the care of your patient.        Sincerely,        Gloria Melissa MD

## 2024-11-06 NOTE — PROGRESS NOTES
Assessment & Plan     Acute post-operative pain    Dinora is doing okay since her G and J tube replacement.  She has been able to tolerate some oral intake.  She is still using oxycodone fairly frequently- maybe on average 25mg per day- but she does hope to taper off of this.  I refilled 60 tablets which should hopefully last until her surgery appointment in 2 weeks.     - cyclobenzaprine (FLEXERIL) 10 MG tablet; Take 0.5-1 tablets (5-10 mg) by mouth every 8 hours as needed for muscle spasms.  - oxyCODONE (ROXICODONE) 5 MG tablet; Take 1-2 tablets (5-10 mg) by mouth every 6 hours as needed for pain.    Anxiety    Hydroxyzine has been helping as prn anxiety medication; refilled    - hydrOXYzine HCl (ATARAX) 25 MG tablet; Take 1 tablet (25 mg) by mouth every 6 hours as needed for anxiety or other (adjuvant pain).    Insomnia due to medical condition    Started on trazodone in the hospital and this has helped with sleep.  Discussed that sleep medications are typically not helpful long term but we can use this for a couple of months as she adjusts from her hospital stay and hopefully can establish better sleep patterns.     - traZODone (DESYREL) 50 MG tablet; Take 1 tablet (50 mg) by mouth at bedtime.    Flu and COVID vaccines today    MED REC REQUIRED  Post Medication Reconciliation Status: discharge medications reconciled and changed, per note/orders        No follow-ups on file.    Subjective   Dinora is a 59 year old, presenting for the following health issues:  Hospital F/U (Pt was in hospital 10/11 - 10/28, returned 10/31 for malpositioning for J tube, broken G tube) and Recheck Medication (Pt would like review medications)    History of Present Illness       Mental Health Follow-up:  Patient presents to follow-up on Depression & Anxiety.Patient's depression since last visit has been:  Medium  The patient is not having other symptoms associated with depression.  Patient's anxiety since last visit has been:   Bad  The patient is not having other symptoms associated with anxiety.  Any significant life events: grief or loss and health concerns  Patient is feeling anxious or having panic attacks.  Patient has no concerns about alcohol or drug use.    Diabetes:   She presents for follow up of diabetes.  She is checking home blood glucose four or more times daily.   She checks blood glucose before meals.  Blood glucose is sometimes over 200 and sometimes under 70. She is aware of hypoglycemia symptoms including shakiness, dizziness, weakness, lethargy, confusion and other.   She is concerned about low blood sugar, several less than 70 in the past few weeks.    She is not experiencing numbness or burning in feet, excessive thirst, blurry vision, weight changes or redness, sores or blisters on feet.           Hypothyroidism:     Since last visit, patient describes the following symptoms::  Anxiety, Depression and Fatigue    She eats 0-1 servings of fruits and vegetables daily.She consumes 0 sweetened beverage(s) daily.She exercises with enough effort to increase her heart rate 10 to 19 minutes per day.  She exercises with enough effort to increase her heart rate 4 days per week.   She is taking medications regularly.         Hospital Follow-up Visit:    Hospital/Nursing Home/IP Rehab Facility: [facility]  Date of Admission: 10/11  Date of Discharge: 10/28  Reason(s) for Admission: Ex Lap, BAUDILIO, revision of J-tube, small bowel resection, TAC with ileorectal anastomosis and diverting loop ileostomy, flexible sigmoidoscopy on 10/11/2024.     She returned on 10/31 for G tube and J tube exchange due to malpositioning J tube and broken G tube    Was the patient in the ICU or did the patient experience delirium during hospitalization?  No  Do you have any other stressors you would like to discuss with your provider? No    Problems taking medications regularly:  None  Medication changes  "since discharge: None  Problems adhering to non-medication therapy:  None    Summary of hospitalization:  [facility] discharge summary reviewed  Diagnostic Tests/Treatments reviewed.  Follow up needed: none  Other Healthcare Providers Involved in Patient s Care:    SOT/endo today  Neuro 11/7  GI 11/20  Colorecal 11/20  Wound care 11/22            Update since discharge: improved.     I had been contacted at discharge on 10/31 to provide a short refill of oxycodone, hydroxyzine, and Flexeril until her follow-up today.     Dinora messaged earlier this week requesting a refill of trazodone that was started in the hospital and was helping with sleep.  No side effects.       Today, she reports still having some discomfort and leakage around the tube.  She is using hydroxyzine 2-3 times per day when she gets jumpy and brief panic.  Hydroxyzine does help for awhile.  Anxiety overall is not going away.      Flexeril is helping stomach spasms and she takes this a couple of times per day.     She is now able to eat food and swallow pills.  Has been able to reduce nausea medication by a quarter.  She requests more oxycodone.  She is using this 5-10mg every 8 hours and has a few days left.  Plan is not to resume the buprenorphine and to taper off narcotics.      [facility]  reviewed:  last refill of buprenorphine was 9/3 for 30 days.  She got oxycodone refilled on 9/11 for 3 days, 10/24 for 4 days, and 11/1 for 4 days ([facility]  says 15 days but this doesn't calculate out).  All prescriptions from different providers.       Plan of care communicated with patient               Constitutional, GI and psych systems are negative, except as otherwise noted.       Objective    /66 (BP Location: Left arm, Patient Position: Sitting, Cuff Size: Adult Regular)   Pulse 95   Temp 97.7  F (36.5  C) (Oral)   Resp 16   Ht 1.727 m (5' 7.99\")   Wt 64.6 kg (142 lb 6.4 oz)   SpO2 99%   BMI 21.66 kg/m    Body mass index is " 21.66 kg/m .  Physical Exam     GENERAL: Healthy, alert and no distress  EYES: Eyes grossly normal to inspection.  No discharge or erythema, or obvious scleral/conjunctival abnormalities.  RESP: No audible wheeze, cough, or visible cyanosis.  No visible retractions or increased work of breathing.    SKIN: Visible skin clear. No significant rash, abnormal pigmentation or lesions.  NEURO: Cranial nerves grossly intact.  Mentation and speech appropriate for age.  PSYCH: Mentation appears normal, affect normal/bright, judgement and insight intact, normal speech and appearance well-groomed.           Signed Electronically by: Juan Jose Gomez MD

## 2024-11-06 NOTE — PROGRESS NOTES
Dinora is a 59 year old who is being evaluated via a billable video visit.          Video-Visit Details    Type of service:  Video Visit   Originating Location (pt. Location): Home    Distant Location (provider location):  On-site  Platform used for Video Visit: Emeli  Signed Electronically by: Gloria Melissa MD      Video start 11/6/2024, 2:30 pm.  Video End 11/6/2024, 2:50 pm.    [facility]  Islet Autotransplant, Diabetes Follow Up    Problem List:  Patient Active Problem List   Diagnosis    Hypothyroidism    Need for prophylactic immunotherapy    Sprain and strain of other specified sites of hip and thigh    Chondromalacia of patella    Sensorineural hearing loss    Vertiginous syndrome and labyrinthine disorder    Lumbago    Allergic rhinitis due to other allergen    GERD (gastroesophageal reflux disease)    Other type of intractable migraine    History of ERCP    Abdominal pain    S/P ERCP    Post-pancreatectomy diabetes (H)    Pancreatic insufficiency    ACP (advance care planning)    Gastroparesis    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    Incisional hernia, without obstruction or gangrene    Adhesive capsulitis of shoulder, unspecified laterality    S/P hernia repair    Headache, chronic migraine without aura    Chronic pain syndrome    Iron deficiency anemia    Hypoglycemia    Adult failure to thrive    Abdominal pain, generalized    Gastrostomy tube obstruction (H)    Intra-abdominal abscess (H)    Postprocedural intraabdominal abscess (H)    Acquired absence of spleen    Depressive disorder    Diabetes insipidus (H)    Other specified abnormal immunological findings in serum    Poisoning by drug    Sphincter of Oddi dysfunction    Type 1 diabetes mellitus without complication (H)    Myofascial pain    Feeding intolerance    Acute postoperative abdominal pain    Major depression, single episode    History of food intolerance    Malnutrition, unspecified type (H)    Nausea and  vomiting, unspecified vomiting type    On total parenteral nutrition (TPN)    Fever, unspecified fever cause    Chronic pancreatitis, unspecified pancreatitis type (H)    Cholangitis (H)    Abdominal pain, unspecified abdominal location    Bacteremia    Constipation    Right upper quadrant abdominal pain    S/P pancreatic islet cell transplantation (H)           Assessment:  1.  Post pancreatectomy diabetes mellitus, s/p total pancreatectomy and islet autotransplant.  (ICD-10 E89.1)  2.  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   Gastroparesis  4.  Nausea, vomiting      Dinora is a 59 year old with history of chronic pancreatitis who is s/p total pancreatectomy and islet autotransplant.  She has been on and off insulin with A1c in goal range in the past, but more recently has been consistently needing low dose insulin.  Major interval changes complicating diabetes management include: recent colectomy (Oct 2024), TPN use, enteral feeds.  Overall she has excellent glycemic control and we will plan to continue the same insulin dosing with close monitoring. She does need to continue CGM (Yandy 3) due to history of hypoglycemia and day to day variability.  We did discuss insulin analogs as below as Levemir, which she has used recently, is being discontinued and she will need to eventually transition back to glargine.         Plan:  1.  Changes to current diabetes regimen:  Patient Instructions   1)  Let's keep the 3 units of Levemir until you use up the Levemir pens, then change to Semglee (glargine) at the same dose.    2) Continue Novolog 1 per 15g for now but let me know if you do start to see lows.    3)  We will follow up on Feb 20 by virtual visit.       2.  Frequency of blood sugar checks:  Keep wearing Yandy-- this is much better for Dinora than fingersticks because with fingersticks we  miss the really critical data of the post prandial excursions.    3.  Continue routine follow up for autoislet transplant patients:  Mixed meal test (6 mL/kg BoostHP to max of 360 mL) at 3 months, 6 months, and once a year post transplant.  Hemoglobin A1c levels at these time points and quarterly.    4.  Other issues addressed today:  none    Follow up:  4 mos    Contact me for questions at [phone] or [phone].  Emergency number to reach pediatric endocrinology after hours is [phone].        Dr. Gloria Melissa MD  Professor, Pediatric Endocrinology  [facility]  Phone:  [phone]  Electronically signed: 2024 at 3:12 PM           Review of the result(s) of each unique test - HbA1c, jim  30 minutes spent on the date of the encounter doing chart review, history and exam, documentation, downloading and reviewing CGM data,  and further activities per the note    The longitudinal plan of care for the diagnosis(es)/condition(s) as documented were addressed during this visit. Due to the added complexity in care, I will continue to support Dinora in the subsequent management and with ongoing continuity of care.        HPI:  Dinora is a 59 year old  female here for follow up oflaparoscopic assisted total pancreatectomy, islet cell autotransplant, splenectomy, gastrojejunostomy tube revision, choledochoduodenostomy, duodenojejunostomy, Vera-Y reconstruction performed on 2016.  At the time of the procedure, the patient received:  Total Islet number: [id number].  Total Islet number/k.  Islet equivalents: [id number]  Islet equivalents/kilogram: 4091    Post-surgical course was complicated by two minor procedures for a retained foreign body near GJ site in 2017 and hernia repair 2018, and severe gastroparesis (refractory to J-feeds, domperidone).  She was initially insulin independent at about 1 year after surgery.    - Gastroparesis and GI  dysmotility is her major issue post TPIAT. Admitted multiple times. GJ was helpful for short time but did not tolerate well.  Has tried multiple other approaches and continues to worsen, nothing offering benefit.  Tried expanded access domperidone but not helpful  - Also has hypoglycemia (prior treatments SGLT2 inhibitor not helpful, acarbose is helpful when eating, also uses mini glucagon).   -- Multiple failures of GJ feeding tubes.  -- Most recently had total colectomy and ostomy for severe slow transit constipation       At today's visit,   --  Most significant interval hx was surgery on 10/10/24 with:  Exploratory laparotomy, extensive lysis of adhesions, revision of J-tube and small bowel resection, total abdominal colectomy with ileorectal anastomosis and loop ileostomy.   Remarkably she is now able to eat for the first time in quite a while. She had cheerios for breakfast, tuna salad for lunch today.  She still has trouble getting enough fluids.  She has LR at home, and is on tube feeds, and TPN.  -- Tube feeds:  Now at 25 mL/hour, hard time ramping up due to pain.  -- TPN is being decreased, going down to 3 days per week (tues, thurs, sun).  She does have 10 units of insulin in her TPN.     Diabetes history:  Current insulin regimen:  Levemir 3 units  --> on levemir to try to match up with tube feeds but levemir is coming off market.  Also has semglee at home.  Novolog 1 per 15g.     For hypoglycemia she currently uses CGM for early detection plus mini dosing of glucagon as needed.     Wearing Yandy    TIR is 95%  at              Recent hemoglobin A1c levels:      Lab Results   Component Value Date    A1C 5.4 07/23/2024    A1C 5.6 03/20/2024    A1C 5.4 02/17/2022    A1C 5.2 11/27/2021    A1C 5.5 09/18/2021    A1C 5.4 08/05/2021    A1C 5.7 05/12/2021    A1C 5.9 04/01/2021    A1C 5.5 12/17/2020    A1C 5.8 07/30/2020       Hypoglycemia history: mild lows but rapid falls. The patient has had 0 episodes of  severe hypoglycemia (seizure, coma, or neuroglycopenic symptoms severe enough to require assistance from another person).  Blood sugars were reviewed from the patient records and/or the meter download.          Review of systems:  A complete ROS is negative except as noted in HPI above.      Past Medical and Surgical History:  Past Medical History:   Diagnosis Date    Allergic rhinitis, cause unspecified     Allergy to other foods     strawberries, apples, celeries, alice, watermelon    Arthritis     left knee    Choledocholithiasis     long after cholecystectomy    Chronic abdominal pain     Chronic constipation     Chronic nausea     Chronic pancreatitis (H)     Degeneration of lumbar or lumbosacral intervertebral disc     Esophageal reflux     w/ hiatal hernia    Gastroparesis     Hiatal hernia     History of pituitary adenoma     s/p resection    Hypothyroidism     Migraines     Mild hyperlipidemia     On tube feeding diet     presence of GJ tube    Pancreatic disease     PD stricture, 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 endometriosis growth    APPENDECTOMY       SECTION  1993    COLONOSCOPY  2014    COLONOSCOPY N/A 2023    Procedure: COLONOSCOPY, WITH POLYPECTOMY AND BIOPSY;  Surgeon: Percy Chamorro MD;  Location:  GI    ENDOSCOPIC INSERTION TUBE GASTROSTOMY N/A 2021    Procedure: Gastrojejonostomy placement with jejunopexy, PEG tube exchange;  Surgeon: Zackery Montoya MD;  Location:  OR    ENDOSCOPIC RETROGRADE CHOLANGIOPANCREATOGRAM N/A 2015    Procedure: ENDOSCOPIC RETROGRADE CHOLANGIOPANCREATOGRAM;  Surgeon: Mandeep Park MD;  Location:   OR    ENDOSCOPIC RETROGRADE CHOLANGIOPANCREATOGRAM N/A 02/09/2016    Procedure: COMBINED ENDOSCOPIC RETROGRADE CHOLANGIOPANCREATOGRAPHY, PLACE TUBE/STENT;  Surgeon: Mandeep Park MD;  [facility]    ENDOSCOPIC RETROGRADE CHOLANGIOPANCREATOGRAM N/A 03/17/2016    Procedure: COMBINED ENDOSCOPIC RETROGRADE CHOLANGIOPANCREATOGRAPHY, REMOVE FOREIGN BODY OR STENT/TUBE;  Surgeon: Mandeep Park MD;  [facility]    ENDOSCOPIC RETROGRADE CHOLANGIOPANCREATOGRAM N/A 08/02/2016    Procedure: COMBINED ENDOSCOPIC RETROGRADE CHOLANGIOPANCREATOGRAPHY, PLACE TUBE/STENT;  Surgeon: Mandeep Park MD;  [facility]    ENDOSCOPIC RETROGRADE CHOLANGIOPANCREATOGRAM N/A 08/26/2016    Procedure: COMBINED ENDOSCOPIC RETROGRADE CHOLANGIOPANCREATOGRAPHY, REMOVE FOREIGN BODY OR STENT/TUBE;  Surgeon: Mandeep Park MD;  [facility]    ENDOSCOPIC ULTRASOUND UPPER GASTROINTESTINAL TRACT (GI) N/A 10/03/2016    Procedure: ENDOSCOPIC ULTRASOUND, ESOPHAGOSCOPY / UPPER GASTROINTESTINAL TRACT (GI);  Surgeon: Guru Jose Rodas MD;  Location:  OR    ENT SURGERY  1989    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    ENTEROSCOPY SMALL BOWEL N/A 02/03/2022    Procedure: ENTEROSCOPY with possible fistula closure;  Surgeon: Francisco Dodson MD;  Location:  GI    ENTEROSCOPY SMALL BOWEL N/A 9/11/2024    Procedure: Upper endoscopy, gastrostomy tube placement and jejunostomy tube exchange;  Surgeon: Zackery Montoya MD;  [facility]    ESOPHAGOSCOPY, GASTROSCOPY, DUODENOSCOPY (EGD), COMBINED N/A 06/24/2015    Procedure: COMBINED ESOPHAGOSCOPY, GASTROSCOPY, DUODENOSCOPY (EGD), REMOVE FOREIGN BODY;  Surgeon: Mandeep Park MD;  Location:  GI    ESOPHAGOSCOPY, GASTROSCOPY, DUODENOSCOPY (EGD), COMBINED N/A 10/25/2015    Procedure: COMBINED ESOPHAGOSCOPY, GASTROSCOPY, DUODENOSCOPY (EGD);  Surgeon: Sammy Amaro MD;  Location:  GI    ESOPHAGOSCOPY, GASTROSCOPY, DUODENOSCOPY  (EGD), COMBINED N/A 10/25/2015    Procedure: COMBINED ESOPHAGOSCOPY, GASTROSCOPY, DUODENOSCOPY (EGD), BIOPSY SINGLE OR MULTIPLE;  Surgeon: Sammy Amaro MD;  [facility]    ESOPHAGOSCOPY, GASTROSCOPY, DUODENOSCOPY (EGD), COMBINED N/A 01/31/2023    Procedure: ESOPHAGOGASTRODUODENOSCOPY (EGD) with peg replacement ;  Surgeon: Mandeep Park MD;  [facility]    ESOPHAGOSCOPY, GASTROSCOPY, DUODENOSCOPY (EGD), COMBINED N/A 7/24/2024    Procedure: Esophagoscopy, gastroscopy, duodenoscopy (EGD), combined;  Surgeon: Zackery Montoya MD;  [facility]    ESOPHAGOSCOPY, GASTROSCOPY, DUODENOSCOPY (EGD), COMBINED N/A 10/31/2024    Procedure: Esophagoscopy, gastroscopy, duodenoscopy (EGD), combined;  Surgeon: Mandeep Park MD;  [facility]    ESOPHAGOSCOPY, GASTROSCOPY, DUODENOSCOPY (EGD), DILATATION, COMBINED      EXCISE LESION TRUNK N/A 04/17/2017    Procedure: EXCISE LESION TRUNK;  Removal of Abdominal Foreign Body;  Surgeon: Nestor Phoenix MD;  [facility]    HC ESOPH/GAS REFLUX TEST W NASAL IMPED >1 HR N/A 11/19/2015    Procedure: ESOPHAGEAL IMPEDENCE FUNCTION TEST WITH 24 HOUR PH GREATER THAN 1 HOUR;  Surgeon: Thiago Apple MD;  [facility]    HC UGI ENDOSCOPY DIAG W BIOPSY  09/17/2008    HC UGI ENDOSCOPY DIAG W BIOPSY  09/27/2012    HC UGI ENDOSCOPY W ESOPHAGEAL DILATION BALLOON <30MM  09/17/2008    HC UGI ENDOSCOPY W EUS N/A 05/05/2015    Procedure: COMBINED ENDOSCOPIC ULTRASOUND, ESOPHAGOSCOPY, GASTROSCOPY, DUODENOSCOPY (EGD);  Surgeon: Wm Dueñas MD;  [facility]    HC WRIST ARTHROSCOP,RELEASE XVERS LIG Bilateral 12/17/2008    INJECT TRANSVERSUS ABDOMINIS PLANE (TAP) BLOCK BILATERAL Left 09/22/2016    Procedure: INJECT TRANSVERSUS ABDOMINIS PLANE (TAP) BLOCK BILATERAL;  Surgeon: Dickson Corrigan MD;  [facility]    INJECT TRIGGER POINT Bilateral 09/08/2022    Procedure: Abdominal trigger point injection with ultrasound;  Surgeon: Keyshawn  MD Monika;  [facility]    INJECT TRIGGER POINT SINGLE / MULTIPLE 3 OR MORE MUSCLES Right 11/15/2022    Procedure: Trigger point injections right abdomen with ultrasound guidance;  Surgeon: Monika Mahajan MD;  [facility]: [facility]    IR CHEST PORT PLACEMENT < 5 YRS OF AGE  06/10/2022    IR CVC TUNNEL PLACEMENT > 5 YRS OF AGE  4/15/2024    IR PORT REMOVAL RIGHT  11/09/2023    LAPAROSCOPIC ASSISTED COLECTOMY N/A 10/11/2024    Procedure: Exploratory laparotomy, extensive lysis of adhesions, revision of Jtube and small bowel resection, TOTAL ABDOMINAL COLECTOMY WITH ILEORECTAL ANASTAMOSIS, DIVERTING LOOP ILEOSTOMY, flexible sigmoidoscopy;  Surgeon: Kaylene Branch MD;  [facility]    laparoscopic pineda  01/01/1995    LAPAROSCOPIC HERNIORRHAPHY INCISIONAL N/A 08/23/2018    Procedure: LAPAROSCOPIC HERNIORRHAPHY INCISIONAL;  Laparoscopic Incisional Hernia Repair with Symbotex Mesh Implant;  Surgeon: Nestor Phoenix MD;  [facility]    LAPAROSCOPIC PANCREATECTOMY, TRANSPLANT AUTO ISLET CELL N/A 12/28/2016    Procedure: LAPAROSCOPIC PANCREATECTOMY, TRANSPLANT AUTO ISLET CELL;  Surgeon: Nestor Phoenix MD;  [facility]    LAPAROTOMY EXPLORATORY N/A 01/31/2023    Procedure: Exploratory Laparotomy, lysis of adhesions, Perforated J-Junostomy Resection, Replace J-Junostomy site;  Surgeon: Elver Bauer MD;  [facility]    LAPAROTOMY, LYSIS ADHESIONS, COMBINED N/A 01/31/2023    Procedure: Dilatation of jejunostomy feeding tube, track with placement of jejunostomy tube with fluoroscopy;  Surgeon: Elver Bauer MD;  [facility]    PICC DOUBLE LUMEN PLACEMENT Left 11/13/2023    Basilic Vein 5F DL 43 cm    REMOVE AND REPLACE BREAST IMPLANT PROSTHESIS N/A 12/30/2022    Procedure: Exploratory Laparotomy, lysis of adhesions, small bowel resection. Placement of gastric jejunostomy for enteral feeding.;  Surgeon: Elver Bauer MD;  [facility]    REMOVE GASTROSTOMY  TUBE ADULT N/A 01/28/2022    Procedure: REMOVAL, GASTROSTOMY TUBE, ADULT;  Surgeon: Mandeep Park MD;  [facility]    REPLACE GASTROJEJUNOSTOMY TUBE, PERCUTANEOUS N/A 09/07/2021    Procedure: GASTROJEJUNOSTOMY TUBE PLACEMENT, PERCUTANEOUS, WITH GASTROPEXY;  Surgeon: Mandeep Park MD;  [facility]    REPLACE GASTROJEJUNOSTOMY TUBE, PERCUTANEOUS N/A 09/22/2021    Procedure: REPLACEMENT, GASTROJEJUNOSTOMY TUBE, PERCUTANEOUS;  Surgeon: Zackery Montoya MD;  [facility]    REPLACE GASTROJEJUNOSTOMY TUBE, PERCUTANEOUS N/A 11/22/2022    Procedure: REPLACEMENT, GASTROJEJUNOSTOMY TUBE, PERCUTANEOUS;  Surgeon: Mandeep Park MD;  [facility]    REPLACE GASTROJEJUNOSTOMY TUBE, PERCUTANEOUS N/A 12/09/2022    Procedure: REPLACEMENT, GASTROJEJUNOSTOMY TUBE, PERCUTANEOUS with ENDOSCOPIC SUTURING.;  Surgeon: Mandeep Park MD;  [facility]    REPLACE GASTROJEJUNOSTOMY TUBE, PERCUTANEOUS N/A 08/01/2023    Procedure: Replace Gastrojejunostomy Tube, Percutaneous;  Surgeon: Mandeep Park MD;  [facility]    REPLACE GASTROJEJUNOSTOMY TUBE, PERCUTANEOUS N/A 11/11/2023    Procedure: Replace Gastrojejunostomy Tube, Percutaneous;  Surgeon: Francisco Dodson MD;  [facility]    REPLACE GASTROJEJUNOSTOMY TUBE, PERCUTANEOUS N/A 12/8/2023    Procedure: Replace Gastrojejunostomy Tube, Percutaneous;  Surgeon: Mandeep Park MD;  [facility]    REPLACE GASTROSTOMY TUBE, PERCUTANEOUS N/A 10/31/2024    Procedure: REPLACEMENT, GASTROSTOMY TUBE, PERCUTANEOUS;  Surgeon: Mandeep Park MD;  [facility]    REPLACE JEJUNOSTOMY TUBE, PERCUTANEOUS N/A 09/10/2021    Procedure: UPPER ENDOSCOPY, REPLACEMENT OF PERCUTANEOUS GASTROJEJUNOSTOMY TUBE, T-TAG GASTROPEXY;  Surgeon: Zackery Montoya MD;  [facility]    REPLACE JEJUNOSTOMY TUBE, PERCUTANEOUS N/A 12/29/2021    Procedure: REPLACEMENT, JEJUNOSTOMY TUBE, PERCUTANEOUS;  Surgeon: Mandeep Park  MD;  [facility]    REPLACE JEJUNOSTOMY TUBE, PERCUTANEOUS N/A 2023    Procedure: REPLACEMENT, JEJUNOSTOMY TUBE, PERCUTANEOUS;  Surgeon: Mandeep Park MD;  [facility]    REPLACE JEJUNOSTOMY TUBE, PERCUTANEOUS  2023    Procedure: Replace Jejunostomy Tube, Percutaneous;  Surgeon: Mandeep Park MD;  [facility]    REPLACE JEJUNOSTOMY TUBE, PERCUTANEOUS N/A 2024    Procedure: REPLACEMENT, JEJUNOSTOMY TUBE, PERCUTANEOUS;  Surgeon: Francisco Dodson MD;  [facility]    REPLACE JEJUNOSTOMY TUBE, PERCUTANEOUS N/A 10/31/2024    Procedure: REPLACEMENT, JEJUNOSTOMY TUBE, PERCUTANEOUS;  Surgeon: Mandeep Park MD;  [facility]    transphenoidal pituitary resection  1990    [facility]  DELIVERY ONLY  1996    Z  DELIVERY ONLY  1998    repeat c section with incidental cystotomy with repair    Z EXCIS PITUITARY,TRANSNASAL/SEPTAL  1980    pituitary tumor removed for diabetes insipidus    [facility] TOTAL ABDOM HYSTERECTOMY  1999    w/ bilateral salpingoophorectomy        Family History:  New changes since last visit:  none  Family History   Problem Relation Age of Onset    Lipids Mother     Hypertension Mother     Thyroid Disease Mother     Depression Mother     Angina Mother     GERD Mother     Skin Cancer Mother     Migraines Mother     Autoimmune Disease Mother     Hyperlipidemia Mother     Mental Illness Mother     Cerebrovascular Disease Mother         2023    Other Cancer Mother         skin-basil cell    Anxiety Disorder Mother     Post Operative Nausea and Vomiting Mother     Eye Disorder Father         cataract, detached retina    Myocardial Infarction Father 60    Lipids Father     Cerebrovascular Disease Father     Depression Father     Substance Abuse Father     Anesthesia Reaction Father         stroke right after surgery    Cataracts Father     Osteoarthritis Father     Ulcerative Colitis Father     Autoimmune Disease  Father     Heart Disease Father     Hyperlipidemia Father     Mental Illness Father     Other Cancer Father         myloma    Anxiety Disorder Father     Interpersonal Violence Sister     Coronary Artery Disease Sister         angioplasty    GERD Sister     Substance Abuse Sister     Cerebrovascular Disease Sister         2005    Depression Sister     Thyroid Disease Sister     Autoimmune Disease Sister     Depression Sister     Mental Illness Sister     Substance Abuse Sister     Thyroid Disease Sister     Eye Disorder Maternal Grandmother         cataract    Thyroid Disease Maternal Grandmother     Diabetes Maternal Grandfather     Eye Disorder Paternal Grandmother         cataract    Diabetes Paternal Grandmother     Eye Disorder Paternal Grandfather         cataract    Diabetes Paternal Grandfather     Substance Abuse Paternal Grandfather     Eye Disorder Son         ptosis    Depression Son     Anxiety Disorder Son     Depression Son     Mental Illness Son     Anxiety Disorder Son     Heart Disease Paternal Aunt     Diabetes Paternal Aunt     Diabetes Paternal Uncle     Heart Disease Paternal Uncle     Depression Nephew     Anxiety Disorder Nephew     Thyroid Disease Nephew     Thyroid Disease Nephew     Anxiety Disorder Nephew     Diabetes Type 2  Cousin         paternal cousin    Autoimmune Disease Cousin        Social History:  Social History     Social History Narrative     with 3 children and a dog.  No smoking, etoh or drug use.  Worked as a  for [employer] in [employer] in the past.  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  BMI= There is no height or weight on file to calculate BMI.  General:  Appearance is normal, no acute distress  HEENT:  NC/AT, sclera appear white  Neck:  No obvious thyromegaly  CV/Lungs:   Non distressed breathing  Skin:  No apparent rashes  Neuro:  Normal mental status  Psych:  Normal affect

## 2024-11-07 ENCOUNTER — HOME INFUSION BILLING (OUTPATIENT)
Dept: HOME HEALTH SERVICES | Facility: HOME HEALTH | Age: 59
End: 2024-11-07
Payer: COMMERCIAL

## 2024-11-07 ENCOUNTER — OFFICE VISIT (OUTPATIENT)
Dept: NEUROLOGY | Facility: CLINIC | Age: 59
End: 2024-11-07
Payer: COMMERCIAL

## 2024-11-07 DIAGNOSIS — E03.9 HYPOTHYROIDISM, UNSPECIFIED TYPE: ICD-10-CM

## 2024-11-07 DIAGNOSIS — G43.709 CHRONIC MIGRAINE W/O AURA W/O STATUS MIGRAINOSUS, NOT INTRACTABLE: Primary | ICD-10-CM

## 2024-11-07 LAB — T4 FREE SERPL-MCNC: 0.66 NG/DL (ref 0.9–1.7)

## 2024-11-07 PROCEDURE — 64615 CHEMODENERV MUSC MIGRAINE: CPT | Mod: 79 | Performed by: NURSE PRACTITIONER

## 2024-11-07 PROCEDURE — E1399 DURABLE MEDICAL EQUIPMENT MI: HCPCS

## 2024-11-07 PROCEDURE — A4215 STERILE NEEDLE: HCPCS

## 2024-11-07 PROCEDURE — A6403 STERILE GAUZE>16 <= 48 SQ IN: HCPCS

## 2024-11-07 NOTE — RESULT ENCOUNTER NOTE
Your thyroid level is low.  Let's increase the levothyroxine to 175mcg (7mL) daily and recheck the level in 2 months.

## 2024-11-07 NOTE — LETTER
"11/7/2024       RE: Dinora Mcghee  [address] [facility] [address]     Dear Colleague,    Thank you for referring your patient, Dinora Mcghee, to the [facility] [place] at [facility]. Please see a copy of my visit note below.    PROCEDURE NOTE:     [facility]  Botulinum Toxin Procedure     Headache Neurology        08/15/24     Procedure:  OnabotulinumtoxinA injections for chronic migraine  Indication:  Chronic migraine     Dinora suffers from severe intractable headaches.  She was referred by for onabotulinumtoxinA injections for headache.  Risks, benefits, and alternatives were discussed.  All questions were answered and consent given.  She decided to proceed with the injections.      Headache history, from initial consult note: See Initial Headache Clinic visit on 8/3/2022 for details     Prior to initiation of botulinum toxin injections, Ms. Mcghee reported 13-15 headache days per month, with 3-4 severe headache days per month. Her headaches are quite disabling and often interfere with her ability to function normally.     Date of last injections: 05/23/24     Ms. Mcghee reports 3 migraine headaches in the last month before Botox    She has noticed a wearing off phenomenon prior to this round of botulinum toxin injections, lasting 2-3 weeks.     Botulinum toxin injections have improved their functioning: able to read better, walking and not in bed      She has attempted other migraine prophylactic treatments in the past, which have included:   Amitriptyline 30 mg   Topiramate  Sumatriptan   lyrica-\"brain fog\"  depakote -allergies  Compazine +benedryl  Promethazine IV three times per week for   scopalamine patch     She currently takes amitriptyline, promethazine, zolmitriptine for headache prevention.     Ms. Mcghee's pain was assessed prior to the procedure.  She rated her pain today as 0 out of 10.     Procedural Pause: " Procedural pause was conducted to verify correct patient identity, procedure to be performed, correct side and site, correct patient position, and special requirements. Appropriate hand hygiene was utilized, and each injection site was prepped with alcohol wipe or Chloraprep swab.      Procedure Details: 200 units of onabotulinumtoxinA was diluted in 4 mL 0.9% normal saline. A total of 155 units of onabotulinumtoxinA were injected using 30 gauge 0.5 in needles into the muscles listed below. 45 units of onabotulinumtoxinA were wasted.         GOAL OF PROCEDURE:  The goal of this procedure is to decrease pain and enhance functional independence.     CONSENT:  The risks, benefits, and treatment options were discussed with the patient who agreed to proceed.     Written consent was obtained      EQUIPMENT USED:  Needles-30 gauge, 0.5 inches for injections  Four 1-ml tuberculin syringes for injections  One sodium chloride 10 ml vial preservative free  Alcohol swabs     SKIN PREPARATION:  Skin preparation was performed using an alcohol wipe.        AREA/MUSCLE INJECTED:  155 units of Botox  Right upper Trapezius (upper cervical) - 5 units of Botox at 3 site/s.   Left upper Trapezius (upper cervical) - 5 units of Botox at 3 site/s.      Right cervical paraspinals - 5 units of Botox at 2 site/s.   Left cervical paraspinals - 5 units of Botox at 2 site/s.      Left occipitalis - 5 units of Botox at 3 site/s.  Right occipitalis -5 units of Botox at 3 site/s     Right Frontalis - 5 units of Botox at 2 site/s.  Left Frontalis - 5 units of Botox at 2 site/s.     Right Temporalis - 5 units of Botox at 4 site/s.  Left Temporalis - 5 units of Botox at 4 site/s.     Right  - 5 units of Botox at 1 site/s.              Left  - 5 units of Botox at 1 site/s.     Procerus - 5 units of Botox at 1 site/s.     RESPONSE TO PROCEDURE:  tolerated the procedure well and there were no immediate complications.  Patient was  allowed to recover for an appropriate period of time and was discharged home in stable condition.     Left PICC -patient asked to take a look at her PICC site. Painful when bumps into something but otherwise no pain, no erythema, edema. 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 She will observed and goes to ED if any problems      FOLLOW UP:     Repeat Botox injections in 12 weeks        This procedure was performed under a hospital privileging agreement with NATALY Bonner, CNP  [facility]      Again, thank you for allowing me to participate in the care of your patient.      Sincerely,    NATALY Hoffman CNP

## 2024-11-07 NOTE — PROGRESS NOTES
"PROCEDURE NOTE:     [facility]  Botulinum Toxin Procedure     Headache Neurology        08/15/24     Procedure:  OnabotulinumtoxinA injections for chronic migraine  Indication:  Chronic migraine     Dinora suffers from severe intractable headaches.  She was referred by for onabotulinumtoxinA injections for headache.  Risks, benefits, and alternatives were discussed.  All questions were answered and consent given.  She decided to proceed with the injections.      Headache history, from initial consult note: See Initial Headache Clinic visit on 8/3/2022 for details     Prior to initiation of botulinum toxin injections, Ms. Mcghee reported 13-15 headache days per month, with 3-4 severe headache days per month. Her headaches are quite disabling and often interfere with her ability to function normally.     Date of last injections: 05/23/24     Ms. Mcghee reports 3 migraine headaches in the last month before Botox    She has noticed a wearing off phenomenon prior to this round of botulinum toxin injections, lasting 2-3 weeks.     Botulinum toxin injections have improved their functioning: able to read better, walking and not in bed      She has attempted other migraine prophylactic treatments in the past, which have included:   Amitriptyline 30 mg   Topiramate  Sumatriptan   lyrica-\"brain fog\"  depakote -allergies  Compazine +benedryl  Promethazine IV three times per week for   scopalamine patch     She currently takes amitriptyline, promethazine, zolmitriptine for headache prevention.     Ms. Mcghee's pain was assessed prior to the procedure.  She rated her pain today as 0 out of 10.     Procedural Pause: Procedural pause was conducted to verify correct patient identity, procedure to be performed, correct side and site, correct patient position, and special requirements. Appropriate hand hygiene was utilized, and each injection site was prepped with alcohol wipe or Chloraprep swab.      Procedure Details: 200 " units of onabotulinumtoxinA was diluted in 4 mL 0.9% normal saline. A total of 155 units of onabotulinumtoxinA were injected using 30 gauge 0.5 in needles into the muscles listed below. 45 units of onabotulinumtoxinA were wasted.         GOAL OF PROCEDURE:  The goal of this procedure is to decrease pain and enhance functional independence.     CONSENT:  The risks, benefits, and treatment options were discussed with the patient who agreed to proceed.     Written consent was obtained      EQUIPMENT USED:  Needles-30 gauge, 0.5 inches for injections  Four 1-ml tuberculin syringes for injections  One sodium chloride 10 ml vial preservative free  Alcohol swabs     SKIN PREPARATION:  Skin preparation was performed using an alcohol wipe.        AREA/MUSCLE INJECTED:  155 units of Botox  Right upper Trapezius (upper cervical) - 5 units of Botox at 3 site/s.   Left upper Trapezius (upper cervical) - 5 units of Botox at 3 site/s.      Right cervical paraspinals - 5 units of Botox at 2 site/s.   Left cervical paraspinals - 5 units of Botox at 2 site/s.      Left occipitalis - 5 units of Botox at 3 site/s.  Right occipitalis -5 units of Botox at 3 site/s     Right Frontalis - 5 units of Botox at 2 site/s.  Left Frontalis - 5 units of Botox at 2 site/s.     Right Temporalis - 5 units of Botox at 4 site/s.  Left Temporalis - 5 units of Botox at 4 site/s.     Right  - 5 units of Botox at 1 site/s.              Left  - 5 units of Botox at 1 site/s.     Procerus - 5 units of Botox at 1 site/s.     RESPONSE TO PROCEDURE:  tolerated the procedure well and there were no immediate complications.  Patient was allowed to recover for an appropriate period of time and was discharged home in stable condition.     Left PICC -patient asked to take a look at her PICC site. Painful when bumps into something but otherwise no pain, no erythema, edema. 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 She will observed and goes to ED if any problems      FOLLOW UP:     Repeat Botox injections in 12 weeks        This procedure was performed under a hospital privileging agreement with NATALY Bonner, CNP  [facility]

## 2024-11-08 PROCEDURE — A4217 STERILE WATER/SALINE, 500 ML: HCPCS

## 2024-11-08 PROCEDURE — B4185 PARENTERAL SOL 10 GM LIPIDS: HCPCS

## 2024-11-11 ENCOUNTER — MYC MEDICAL ADVICE (OUTPATIENT)
Dept: INTERNAL MEDICINE | Facility: CLINIC | Age: 59
End: 2024-11-11
Payer: COMMERCIAL

## 2024-11-11 ENCOUNTER — TRANSFERRED RECORDS (OUTPATIENT)
Dept: HEALTH INFORMATION MANAGEMENT | Facility: CLINIC | Age: 59
End: 2024-11-11
Payer: COMMERCIAL

## 2024-11-11 PROCEDURE — 99601 HOME NFS VISIT <2 HRS: CPT

## 2024-11-12 ENCOUNTER — HOME INFUSION (OUTPATIENT)
Dept: HOME HEALTH SERVICES | Facility: HOME HEALTH | Age: 59
End: 2024-11-12
Payer: COMMERCIAL

## 2024-11-12 DIAGNOSIS — E43 UNSPECIFIED SEVERE PROTEIN-CALORIE MALNUTRITION (H): Primary | ICD-10-CM

## 2024-11-12 DIAGNOSIS — K31.84 GASTROPARESIS: ICD-10-CM

## 2024-11-12 RX ORDER — NUTRITIONAL SUPPLEMENT/FIBER 0.05 G-1.5
1463 LIQUID (ML) ORAL DAILY
Qty: 43890 ML | Refills: 8 | Status: ON HOLD | OUTPATIENT
Start: 2024-11-12 | End: 2024-11-19

## 2024-11-12 RX ORDER — ENTERAL PUMP ACCESS.HYDROLYSIS
3 CARTRIDGE (EA) MISCELLANEOUS DAILY
Qty: 90 EACH | Refills: 8 | Status: ON HOLD | OUTPATIENT
Start: 2024-11-12 | End: 2024-11-19

## 2024-11-12 RX ORDER — AMINO AC/PROTEIN HYDR/WHEY PRO 10G-100/30
45 LIQUID (ML) ORAL DAILY
Qty: 1350 ML | Refills: 8 | Status: ACTIVE | OUTPATIENT
Start: 2024-11-12 | End: 2025-07-25

## 2024-11-12 NOTE — PROGRESS NOTES
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 Intraoperatively there was an enterotomy at site of prior J tube requiring small bowel resection and replacement of J tube.     Final Diagnosis    A. OMENTUM, EXCISION:  - Fibroadipose tissue with focal acute inflammation.    B. COLON AND ILEUM, RESECTION:  - Small and large intestine with patchy serosal inflammation and fibrosis.  - Surgical margins are viable and unremarkable.  - Thirteen reactive lymph nodes (0/13).    C. JEJENUM AND J-TUBE SIDE, RESECTION:  - Segment of small intestine with contiguous squamous epithelium (ostomy site),  showing mild acute inflammation, reactive changes, mural defect (on gross examination) and serosal inflammation and fibrosis.   - Surgical margins are viable and unremarkable.  - Four reactive lymph nodes (0/4).     Interval history: Dinora had a slow postoperative recovery and was eventually discharged on 10/25 (2 weeks postop). She was ultimately discharged with TPN because she could not tolerate tube feeds (due to increasing abdominal pain and leaking around the J tube). Both G and J tubes had to be replaced on 10/31 by Dr. Park due to dysfunction.   Her KELI tube fell out and she was just in the emergency room for 2 days.  This was not replaced.  She is still having some pain at the site.  She is now able to eat and is doing protein supplements and electrolyte drinks as well as TPN 3  days a week.  She reports that she had a fever of 104 earlier today but was under a heating blanket at the time.  She did have some associated chills.  She took Tylenol and Advil and her fever has since resolved.  She reports that she did not get IV fluids while she was in the emergency room and usually gets these on her off days from the TPN and feels that her mouth is a little dry.  No dizziness or lightheadedness.  Her ostomy is functioning well but she does tend to have loose stool overnight and she has had 2 explosions because of this.  She gets some low abdominal cramping intermittently but no nausea or vomiting.  No pouching issues.    PLEASE SEE NOTE BELOW FOR PHYSICAL EXAMINATION, REVIEW OF SYSTEMS, AND OTHER HISTORY.    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  J-tube fell out but she is been able to eat and is on TPN 3 days a week. Will check CBC and BMP given her fever, hypotension, and tachycardia.  She is able to do IV fluids at home tonight.  Would recommend trying 1 Imodium before bed for high outputs during the night.    I spent a total of 20 minutes on the day of the visit.  Time spent on date of encounter doing chart review, history and exam, documentation, and further activities in this note. This is a postoperative visit.    Kaylene Branch MD  Colon and Rectal Surgery Staff  [facility]    Medical history:  Past Medical History:   Diagnosis Date    Allergic rhinitis, cause unspecified     Allergy to other foods     strawberries, apples, celeries, alice, watermelon    Arthritis     left knee    Choledocholithiasis     long after cholecystectomy    Chronic abdominal pain     Chronic constipation     Chronic nausea     Chronic pancreatitis (H)      Degeneration of lumbar or lumbosacral intervertebral disc     Esophageal reflux     w/ hiatal hernia    Gastroparesis     Hiatal hernia     History of pituitary adenoma     s/p resection    Hypothyroidism     Migraines     Mild hyperlipidemia     On tube feeding diet     presence of GJ tube    Pancreatic disease     PD stricture, 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 endometriosis growth    APPENDECTOMY       SECTION  1993    COLONOSCOPY  2014    COLONOSCOPY N/A 2023    Procedure: COLONOSCOPY, WITH POLYPECTOMY AND BIOPSY;  Surgeon: Percy Chamorro MD;  [facility]    ENDOSCOPIC INSERTION TUBE GASTROSTOMY N/A 2021    Procedure: Gastrojejonostomy placement with jejunopexy, PEG tube exchange;  Surgeon: Zackery Montoya MD;  [facility]    ENDOSCOPIC RETROGRADE CHOLANGIOPANCREATOGRAM N/A 2015    Procedure: ENDOSCOPIC RETROGRADE CHOLANGIOPANCREATOGRAM;  Surgeon: Mandeep Park MD;  [facility]    ENDOSCOPIC RETROGRADE CHOLANGIOPANCREATOGRAM N/A 2016    Procedure: COMBINED ENDOSCOPIC RETROGRADE CHOLANGIOPANCREATOGRAPHY, PLACE TUBE/STENT;  Surgeon: Mandeep Park MD;  [facility]    ENDOSCOPIC RETROGRADE CHOLANGIOPANCREATOGRAM N/A 2016    Procedure: COMBINED ENDOSCOPIC RETROGRADE CHOLANGIOPANCREATOGRAPHY, REMOVE FOREIGN BODY OR STENT/TUBE;  Surgeon: Mandeep Park MD;  [facility]    ENDOSCOPIC RETROGRADE CHOLANGIOPANCREATOGRAM N/A 2016    Procedure: COMBINED ENDOSCOPIC RETROGRADE CHOLANGIOPANCREATOGRAPHY, PLACE TUBE/STENT;  Surgeon: Mandeep Park MD;  [facility]    ENDOSCOPIC  RETROGRADE CHOLANGIOPANCREATOGRAM N/A 08/26/2016    Procedure: COMBINED ENDOSCOPIC RETROGRADE CHOLANGIOPANCREATOGRAPHY, REMOVE FOREIGN BODY OR STENT/TUBE;  Surgeon: Mandeep Park MD;  [facility]    ENDOSCOPIC ULTRASOUND UPPER GASTROINTESTINAL TRACT (GI) N/A 10/03/2016    Procedure: ENDOSCOPIC ULTRASOUND, ESOPHAGOSCOPY / UPPER GASTROINTESTINAL TRACT (GI);  Surgeon: Guru Jose Rodas MD;  [facility]    ENT SURGERY  1989    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    ENTEROSCOPY SMALL BOWEL N/A 02/03/2022    Procedure: ENTEROSCOPY with possible fistula closure;  Surgeon: Francisco Dodson MD;  Location:  GI    ENTEROSCOPY SMALL BOWEL N/A 9/11/2024    Procedure: Upper endoscopy, gastrostomy tube placement and jejunostomy tube exchange;  Surgeon: Zackery Montoya MD;  [facility]    ESOPHAGOSCOPY, GASTROSCOPY, DUODENOSCOPY (EGD), COMBINED N/A 06/24/2015    Procedure: COMBINED ESOPHAGOSCOPY, GASTROSCOPY, DUODENOSCOPY (EGD), REMOVE FOREIGN BODY;  Surgeon: Mandeep Park MD;  [facility]    ESOPHAGOSCOPY, GASTROSCOPY, DUODENOSCOPY (EGD), COMBINED N/A 10/25/2015    Procedure: COMBINED ESOPHAGOSCOPY, GASTROSCOPY, DUODENOSCOPY (EGD);  Surgeon: Sammy Amaro MD;  [facility]    ESOPHAGOSCOPY, GASTROSCOPY, DUODENOSCOPY (EGD), COMBINED N/A 10/25/2015    Procedure: COMBINED ESOPHAGOSCOPY, GASTROSCOPY, DUODENOSCOPY (EGD), BIOPSY SINGLE OR MULTIPLE;  Surgeon: Sammy Amaro MD;  [facility]    ESOPHAGOSCOPY, GASTROSCOPY, DUODENOSCOPY (EGD), COMBINED N/A 01/31/2023    Procedure: ESOPHAGOGASTRODUODENOSCOPY (EGD) with peg replacement ;  Surgeon: Mandeep Park MD;  [facility]    ESOPHAGOSCOPY, GASTROSCOPY, DUODENOSCOPY (EGD), COMBINED N/A 7/24/2024    Procedure: Esophagoscopy, gastroscopy, duodenoscopy (EGD), combined;  Surgeon: Zackery Montoya MD;  [facility]    ESOPHAGOSCOPY, GASTROSCOPY, DUODENOSCOPY (EGD), COMBINED N/A  10/31/2024    Procedure: Esophagoscopy, gastroscopy, duodenoscopy (EGD), combined;  Surgeon: Mandeep Park MD;  [facility]    ESOPHAGOSCOPY, GASTROSCOPY, DUODENOSCOPY (EGD), DILATATION, COMBINED      EXCISE LESION TRUNK N/A 04/17/2017    Procedure: EXCISE LESION TRUNK;  Removal of Abdominal Foreign Body;  Surgeon: Nestor Phoenix MD;  [facility]    HC ESOPH/GAS REFLUX TEST W NASAL IMPED >1 HR N/A 11/19/2015    Procedure: ESOPHAGEAL IMPEDENCE FUNCTION TEST WITH 24 HOUR PH GREATER THAN 1 HOUR;  Surgeon: Thiago Apple MD;  [facility]    HC UGI ENDOSCOPY DIAG W BIOPSY  09/17/2008    HC UGI ENDOSCOPY DIAG W BIOPSY  09/27/2012    HC UGI ENDOSCOPY W ESOPHAGEAL DILATION BALLOON <30MM  09/17/2008    HC UGI ENDOSCOPY W EUS N/A 05/05/2015    Procedure: COMBINED ENDOSCOPIC ULTRASOUND, ESOPHAGOSCOPY, GASTROSCOPY, DUODENOSCOPY (EGD);  Surgeon: Wm Dueñas MD;  [facility]    HC WRIST ARTHROSCOP,RELEASE XVERS LIG Bilateral 12/17/2008    INJECT TRANSVERSUS ABDOMINIS PLANE (TAP) BLOCK BILATERAL Left 09/22/2016    Procedure: INJECT TRANSVERSUS ABDOMINIS PLANE (TAP) BLOCK BILATERAL;  Surgeon: Dickson Corrigan MD;  [facility]    INJECT TRIGGER POINT Bilateral 09/08/2022    Procedure: Abdominal trigger point injection with ultrasound;  Surgeon: Monika Mahajan MD;  [facility]    INJECT TRIGGER POINT SINGLE / MULTIPLE 3 OR MORE MUSCLES Right 11/15/2022    Procedure: Trigger point injections right abdomen with ultrasound guidance;  Surgeon: Monika Mahajan MD;  [facility]    IR CHEST PORT PLACEMENT < 5 YRS OF AGE  06/10/2022    IR CVC TUNNEL PLACEMENT > 5 YRS OF AGE  4/15/2024    IR PORT REMOVAL RIGHT  11/09/2023    LAPAROSCOPIC ASSISTED COLECTOMY N/A 10/11/2024    Procedure: Exploratory laparotomy, extensive lysis of adhesions, revision of Jtube and small bowel resection, TOTAL ABDOMINAL COLECTOMY WITH ILEORECTAL ANASTAMOSIS, DIVERTING LOOP ILEOSTOMY, flexible sigmoidoscopy;   Surgeon: Kaylene Branch MD;  [facility]    laparoscopic pineda  01/01/1995    LAPAROSCOPIC HERNIORRHAPHY INCISIONAL N/A 08/23/2018    Procedure: LAPAROSCOPIC HERNIORRHAPHY INCISIONAL;  Laparoscopic Incisional Hernia Repair with Symbotex Mesh Implant;  Surgeon: Nestor Phoenix MD;  [facility]    LAPAROSCOPIC PANCREATECTOMY, TRANSPLANT AUTO ISLET CELL N/A 12/28/2016    Procedure: LAPAROSCOPIC PANCREATECTOMY, TRANSPLANT AUTO ISLET CELL;  Surgeon: Nestor Phoenix MD;  [facility]    LAPAROTOMY EXPLORATORY N/A 01/31/2023    Procedure: Exploratory Laparotomy, lysis of adhesions, Perforated J-Junostomy Resection, Replace J-Junostomy site;  Surgeon: Elver Bauer MD;  [facility]    LAPAROTOMY, LYSIS ADHESIONS, COMBINED N/A 01/31/2023    Procedure: Dilatation of jejunostomy feeding tube, track with placement of jejunostomy tube with fluoroscopy;  Surgeon: Elver Bauer MD;  [facility]    PICC DOUBLE LUMEN PLACEMENT Left 11/13/2023    Basilic Vein 5F DL 43 cm    REMOVE AND REPLACE BREAST IMPLANT PROSTHESIS N/A 12/30/2022    Procedure: Exploratory Laparotomy, lysis of adhesions, small bowel resection. Placement of gastric jejunostomy for enteral feeding.;  Surgeon: Elver Bauer MD;  [facility]    REMOVE GASTROSTOMY TUBE ADULT N/A 01/28/2022    Procedure: REMOVAL, GASTROSTOMY TUBE, ADULT;  Surgeon: Mandeep Park MD;  [facility]    REPLACE GASTROJEJUNOSTOMY TUBE, PERCUTANEOUS N/A 09/07/2021    Procedure: GASTROJEJUNOSTOMY TUBE PLACEMENT, PERCUTANEOUS, WITH GASTROPEXY;  Surgeon: Mandeep Park MD;  [facility]    REPLACE GASTROJEJUNOSTOMY TUBE, PERCUTANEOUS N/A 09/22/2021    Procedure: REPLACEMENT, GASTROJEJUNOSTOMY TUBE, PERCUTANEOUS;  Surgeon: Zackery Montoya MD;  [facility]    REPLACE GASTROJEJUNOSTOMY TUBE, PERCUTANEOUS N/A 11/22/2022    Procedure: REPLACEMENT, GASTROJEJUNOSTOMY TUBE, PERCUTANEOUS;  Surgeon:  Mandeep Park MD;  [facility]    REPLACE GASTROJEJUNOSTOMY TUBE, PERCUTANEOUS N/A 12/09/2022    Procedure: REPLACEMENT, GASTROJEJUNOSTOMY TUBE, PERCUTANEOUS with ENDOSCOPIC SUTURING.;  Surgeon: Mandeep Park MD;  [facility]    REPLACE GASTROJEJUNOSTOMY TUBE, PERCUTANEOUS N/A 08/01/2023    Procedure: Replace Gastrojejunostomy Tube, Percutaneous;  Surgeon: Mandeep Park MD;  [facility]    REPLACE GASTROJEJUNOSTOMY TUBE, PERCUTANEOUS N/A 11/11/2023    Procedure: Replace Gastrojejunostomy Tube, Percutaneous;  Surgeon: Francisco Dodson MD;  [facility]    REPLACE GASTROJEJUNOSTOMY TUBE, PERCUTANEOUS N/A 12/8/2023    Procedure: Replace Gastrojejunostomy Tube, Percutaneous;  Surgeon: Mandeep Park MD;  [facility]    REPLACE GASTROSTOMY TUBE, PERCUTANEOUS N/A 10/31/2024    Procedure: REPLACEMENT, GASTROSTOMY TUBE, PERCUTANEOUS;  Surgeon: Mandeep Park MD;  [facility]    REPLACE JEJUNOSTOMY TUBE, PERCUTANEOUS N/A 09/10/2021    Procedure: UPPER ENDOSCOPY, REPLACEMENT OF PERCUTANEOUS GASTROJEJUNOSTOMY TUBE, T-TAG GASTROPEXY;  Surgeon: Zackery Montoya MD;  [facility]    REPLACE JEJUNOSTOMY TUBE, PERCUTANEOUS N/A 12/29/2021    Procedure: REPLACEMENT, JEJUNOSTOMY TUBE, PERCUTANEOUS;  Surgeon: Mandeep Park MD;  [facility]    REPLACE JEJUNOSTOMY TUBE, PERCUTANEOUS N/A 05/04/2023    Procedure: REPLACEMENT, JEJUNOSTOMY TUBE, PERCUTANEOUS;  Surgeon: Mandeep Park MD;  [facility]    REPLACE JEJUNOSTOMY TUBE, PERCUTANEOUS  08/01/2023    Procedure: Replace Jejunostomy Tube, Percutaneous;  Surgeon: Mandeep Park MD;  [facility]    REPLACE JEJUNOSTOMY TUBE, PERCUTANEOUS N/A 4/16/2024    Procedure: REPLACEMENT, JEJUNOSTOMY TUBE, PERCUTANEOUS;  Surgeon: Francisco Dodson MD;  [facility]    REPLACE JEJUNOSTOMY TUBE, PERCUTANEOUS N/A 10/31/2024    Procedure: REPLACEMENT, JEJUNOSTOMY TUBE, PERCUTANEOUS;  Surgeon:  Mandeep Park MD;  [facility]    transphenoidal pituitary resection  1990    Z  DELIVERY ONLY  1996    Z  DELIVERY ONLY  1998    repeat c section with incidental cystotomy with repair    Z EXCIS PITUITARY,TRANSNASAL/SEPTAL  1980    pituitary tumor removed for diabetes insipidus    Z TOTAL ABDOM HYSTERECTOMY  1999    w/ bilateral salpingoophorectomy        Problem list:    Patient Active Problem List    Diagnosis Date Noted    Dislodged jejunostomy tube 2024     Priority: Medium    Constipation 2024     Priority: Medium    Right upper quadrant abdominal pain 2024     Priority: Medium    S/P pancreatic islet cell transplantation (H) 2024     Priority: Medium    Bacteremia 11/10/2023     Priority: Medium    Abdominal pain, unspecified abdominal location 2023     Priority: Medium    Cholangitis (H) 2023     Priority: Medium    On total parenteral nutrition (TPN) 2023     Priority: Medium    Fever, unspecified fever cause 2023     Priority: Medium    Chronic pancreatitis, unspecified pancreatitis type (H) 2023     Priority: Medium    History of food intolerance 2023     Priority: Medium    Malnutrition, unspecified type (H) 2023     Priority: Medium    Nausea and vomiting, unspecified vomiting type 2023     Priority: Medium    Major depression, single episode 2023     Priority: Medium    Acute postoperative abdominal pain 2023     Priority: Medium    Feeding intolerance 2022     Priority: Medium    Myofascial pain 10/14/2022     Priority: Medium     Added automatically from request for surgery [id number]      Acquired absence of spleen 2022     Priority: Medium     Formatting of this note might be different from the original.  pancreas and spleen removed, islet cells transplanted into liver    3 classes of vaccines needed .    1. Pneumococcal vaccines are as  follows:       PCV 13 - one time dose (was given 2017)       PPSV23 - (given 12/1/16); repeat q 5 years      ROLE of PCV 20???    2. Meningococcal vaccines     Menactra - (last given 12/1/16) -  repeat q 5 yrs   NOTE: need to wait 4 wks after PCV 13 has been given.   OR - give Menveo instead as it can be given same time as PCV 13    AND   Bexcero - (got 12/9/16) - does not need to be repeated.     3. The Hib vaccine - (got 12/9/16) - does not need to be repeated.      Poisoning by drug 05/09/2022     Priority: Medium     Formatting of this note might be different from the original.  Per Care Everywhere review:  All oral, IV and injectable steroids are contraindicated (unless in life threatening situations) in Islet Auto transplant recipients. They can cause irreversible loss of islet cell function. Please contact patient's transplant care coordinator ADI Gaffney RN at [phone]/pager [phone] and/or endocrinologist prior to administration.      Type 1 diabetes mellitus without complication (H) 05/09/2022     Priority: Medium     Formatting of this note might be different from the original.  ACTUALLY - DM 3c - pathogenic diabetes as no pancreas.  (pancreas and spleen removed, islet cells transplanted into liver)    Seeing [facility]O at [facility] - Dr Erum Melissa      Intra-abdominal abscess (H) 12/03/2021     Priority: Medium    Postprocedural intraabdominal abscess (H) 12/03/2021     Priority: Medium    Gastrostomy tube obstruction (H) 11/27/2021     Priority: Medium    Abdominal pain, generalized 09/18/2021     Priority: Medium    Adult failure to thrive 08/16/2021     Priority: Medium     Added automatically from request for surgery [id number]      Hypoglycemia 08/05/2021     Priority: Medium    Iron deficiency anemia 03/22/2019     Priority: Medium    Headache, chronic migraine without aura 09/18/2018     Priority: Medium    Chronic pain syndrome 09/18/2018     Priority: Medium    S/P hernia repair 08/23/2018      Priority: Medium    Incisional hernia, without obstruction or gangrene 05/20/2018     Priority: Medium    Adhesive capsulitis of shoulder, unspecified laterality 11/14/2017     Priority: Medium    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  Excess IgG4  [facility]      Gastroparesis      Priority: Medium    ACP (advance care planning) 03/28/2017     Priority: Medium     Advance Care Planning 3/28/2017: Receipt of ACP document:  Received: Health Care Directive which was witnessed or notarized on 12/8/16.  Document previously scanned on 1/12/17.  Validation form completed and scanned.  Code Status reflects choices in most recent ACP document..  Confirmed/documented designated decision maker(s).  Added by May Baires   Advance Care Planning Liaison              Pancreatic insufficiency 01/17/2017     Priority: Medium    Diabetes insipidus (H) 01/05/2017     Priority: Medium     Formatting of this note might be different from the original.  Diabetes Insipidus (DI)  Per external records.  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  Depression NOS      Other type of intractable migraine      Priority: Medium     Diagnosis updated by automated process. Provider to review and confirm.      GERD (gastroesophageal reflux disease) 12/01/2010     Priority: Medium    Lumbago 04/18/2005     Priority: Medium     History of L5-S1 degenerative disk disease.         Sensorineural hearing loss 01/10/2005     Priority: Medium     Problem list name updated by automated process. Provider to review      Vertiginous syndrome and labyrinthine disorder 01/10/2005     Priority: Medium     Problem list name updated by automated process. Provider to review  IMO Regulatory Load OCT 2020      Sprain and strain of other specified sites of hip and thigh 12/09/2002     Priority: Medium    Chondromalacia of patella 12/09/2002     Priority: Medium    Need for prophylactic immunotherapy      Priority: Medium     trees, grass, srw, dust mites, cat      Allergic rhinitis due to other allergen 12/21/2001     Priority: Medium    Hypothyroidism      Priority: Medium     Problem list name updated by automated process. Provider to review         Medications:  Current Outpatient Medications   Medication Sig Dispense Refill    acetaminophen (TYLENOL) 325 MG/10.15ML solution 20.3 mLs (650 mg) by Per J Tube route every 6 hours as needed for mild pain or fever 473 mL 4    baclofen (LIORESAL) 10 MG tablet Take 1-2 tablets (10-20 mg) by mouth 3 times daily.      cetirizine (ZYRTEC) 10 MG tablet Take 1 tablet (10 mg) by mouth every morning.      COMPOUNDED NON-CONTROLLED SUBSTANCE (CMPD RX) - PHARMACY TO MIX COMPOUNDED MEDICATION Administer 60 mg amitriptyline 2 mg/ml via G-J tube at bedtime (Patient taking differently: Administer 40 mg amitriptyline 2 mg/ml via G-J tube at bedtime) 900 mL 3    COMPOUNDED NON-CONTROLLED SUBSTANCE (CMPD RX) - PHARMACY TO MIX COMPOUNDED MEDICATION Administer 40 mg amitriptyline suspension (2 mg/mL) via G-J tube at bedtime. 600 mL 5    Continuous Blood Gluc Sensor (FREESTYLE LISA 3 SENSOR) MISC CHANGE EVERY 14 DAYS 2 each 11    Continuous Glucose Sensor (FREESTYLE LISA 3 SENSOR) MISC Change every 14 days. 6 each 1    cyclobenzaprine (FLEXERIL) 10 MG tablet Take 0.5-1 tablets (5-10 mg) by mouth every 8 hours as needed for muscle spasms. 60 tablet 4    diphenhydrAMINE  "(BENADRYL) 12.5 MG/5ML solution Take 25 mg by mouth 4 times daily as needed for other (nausea)      DULoxetine HCl 60 MG CSDR Take 1 capsule (60 mg) by mouth daily. Sprinkle caps for feeding tube      Emergency Supply Kit, Central, Patient use for emergency only. Contents: 3 sodium chloride 0.9% flushes, 1 dressing kit, 1 microclave ext set 14\", 4 nitrile gloves (med), 6 alcohol prep pads, 1 bacitracin, 1 syringe (10 cc 20 G 1\"). Call [phone] to reorder. [id number] kit 0    estradiol (VAGIFEM) 10 MCG TABS vaginal tablet INSERT 1 TABLET(10 MCG) VAGINALLY 2 TIMES A WEEK 24 tablet 2    famotidine (PEPCID) 40 MG/5ML suspension Place 2.5 mLs (20 mg) into J tube every evening. 100 mL 1    glucagon 1 MG kit Give 0.1 to 0.15mg( 10-15 units on Insulin sryinge) subcutaneous  every 15 minutes PRN for hypoglycemia. Remix new kit q24hr. Needs up to 3 kit/week. 10 each 3    glucose 40 % GEL gel Take 15-30 g by mouth every 15 minutes as needed for low blood sugar 3 Tube 2    hydrOXYzine HCl (ATARAX) 25 MG tablet Take 1 tablet (25 mg) by mouth every 6 hours as needed for anxiety or other (adjuvant pain). 60 tablet 3    ibuprofen (ADVIL/MOTRIN) 400 MG tablet take 30 mls  by oral route  every 4 - 6 hours as needed      insulin aspart (NOVOLOG FLEXPEN) 100 UNIT/ML pen Take 2 units with meals OR use insulin carbohydrate ratio 1 unit per 15 grams of carbohydrates three time daily with meals/snacks plus correction scale 1 unit per 50 >140 three times daily before meals and at bedtime (ok to correct >140 at bedtime since TPN is running overnight). Total daily Novolog: ~ 15 units/day 15 mL 5    insulin regular 100 UNIT/ML injection Add to infusion 0.18 mLs (18 Units) daily. Add 1 syringe to TPN daily immediately prior to infusing. NOT for direct injection.  For use in TPN only.  Syringe contains 5 units of overfill that will remain in the needle. 64.8 mL 0    insulin syringe-needle U-100 (30G X 1/2\" 0.3 ML) 30G X 1/2\" 0.3 ML " miscellaneous Use 3 syringes daily or as directed. 100 each 11    lactated ringers in 1,000 mL via CADD pump Infuse into the vein daily as needed (dehydration). Remove air via CADD pump. Infuse 1 bag(s) (1000 mL). Each bag to infuse over 2 hours. Reservoir Volume 1000 mL. Continuous rate: 500 mL/hr. [id number] mL 0    lactated ringers infusion Inject 1,000 mLs into the vein daily as needed      levothyroxine (SYNTHROID) 25 mcg/mL SUSP Take 7 mLs (175 mcg) by mouth daily.      lidocaine (LIDODERM) 5 % patch Place onto the skin as needed. To prevent lidocaine toxicity, patient should be patch free for 12 hrs daily.      lipase-protease-amylase (CREON) [id number] units CPEP per EC capsule Take 3-4 capsules by mouth 3 times daily (with meals) With oral meals 150 capsule 11    meclizine (ANTIVERT) 25 MG tablet Take 25 mg by mouth 3 times daily as needed.      methocarbamol (ROBAXIN) 750 MG tablet Take 1 tablet (750 mg) by mouth 4 times daily as needed for muscle spasms 120 tablet 11    miconazole (MICATIN) 2 % external powder Apply topically 2 times daily as needed for other (candidiasis). 43 g 0    montelukast (SINGULAIR) 10 MG tablet Take 10 mg by mouth at bedtime.      Multiple Vitamin (INFUVITE ADULT) injection Add to infusion 10 mLs daily. Select 2 multivitamin vials, one of each color top. Draw up 5 mL from each vial and add to 1 TPN bag daily immediately prior to infusing. 3600 mL 0    Nutritional Supplements (BOOST HIGH PROTEIN) LIQD After above baseline labs are drawn, give: 6 mL/kg to maximum of 360 mL; the beverage is to be consumed within 5 minutes.  0    oxyCODONE (ROXICODONE) 5 MG tablet Take 1-2 tablets (5-10 mg) by mouth every 6 hours as needed for pain. 8 tablet 0    oxyCODONE (ROXICODONE) 5 MG/5ML solution Take 5-10 mLs (5-10 mg) by mouth or G tube every 6 hours as needed for severe pain. 150 mL 0    pantoprazole (PROTONIX) 40 mg IV push injection Inject 40 mg into the vein daily (with breakfast) 30  "each 11    pantoprazole (PROTONIX) injection Inject 10 mLs (40 mg) over 5 minutes into the vein via push daily. Reconstitute vial. Draw up pantoprazole 4 mg/mL in a syringe. Discard remainder of vial. 360 each 0    parenteral nutrition - TNA - see order for formula Infuse 1,690 mLs over 12 hours into the vein (central line) three times a week. TPN additives to be added prior to administration:   Infuvite-Adult 10 mL daily.   Regular Insulin 18 units daily.  Taper up for 1 Hours. Taper down for 1 Hours. Plateau rate:153.7 mL/hr.  KVO: 5 mL/hr. 176414 mL 0    prochlorperazine (COMPAZINE) 10 MG tablet Take 1 tablet (10 mg) by mouth every 6 hours as needed for nausea or vomiting. 20 tablet 0    prochlorperazine (COMPAZINE) 10 MG tablet Take 1 tablet (10 mg) by mouth every 6 hours as needed for nausea or vomiting 120 tablet 3    promethazine (PHENERGAN) injection Add to infusion 1 mL (25 mg) every 8 hours as needed for nausea or vomiting. Draw up in a syringe and add to homepump immediately prior to infusing.  Discard remainder of vial. 1095 mL 0    Prosource TF PO LIQD (PROSOURCE TF) Place 45 mLs into GJ tube daily. Infuse via syringe  1 packet daily  Water flush: 60ml 6x/day 1350 mL 8    rimegepant (NURTEC) 75 MG ODT tablet Place 1 tablet (75 mg) under the tongue daily as needed for migraine Maximum of 1 tablet every 24 hours. 8 tablet 11    scopolamine (TRANSDERM) 1 MG/3DAYS 72 hr patch Place 1 patch onto the skin every 72 hours - Transdermal 10 patch 11    Sharps Container (BD SHARPS ) MISC 1 Container as needed 1 each 1    silver nitrate (ARZOL SILVER NIT APPLICATORS) 75-25 % miscellaneous Apply topically as needed for wound care Apply Silver Nitrate Sticks every 2-3 days until granulation tissue resolved.  \"Roll\" tip of Silver Nitrate Q tip directly onto the granulation tissue-bulging tissue area.  Have Agency or Home Care Nurse administer this, if you prefer.  Take care not to touch any healthy tissue or " skin.  The tissue will turn white and eventually black & scab off.  May repeat if needed in the future for recurrence. 30 applicator 0    sodium chloride 0.9% elastomeric pump Infuse 61 mLs into the vein every 8 hours as needed (for use with promethazine). Add 1 mL (25 mg) of promethazine 25 mg/mL to homepump, then add 5 mL NaCl 0.9% to homepump to flush port, immediately prior to infusing. 055248 mL 0    sodium chloride, PF, 0.9% PF flush Inject 10 mLs into the vein as needed for line flush. Flush IV before and after medication administration as directed and/or at least every 24 hours. 712978 mL 0    sodium chloride, PF, 0.9% PF flush Use 10 mLs for reconstitution daily. Add 1 syringe (10 mL) to each vial of pantoprazole 40 mg. Swirl until solution is clear. 3600 mL 0    sodium chloride, PF, 0.9% PF flush Add to infusion 5 mLs every 8 hours. Add to homepump after adding promethazine to flush port. 5400 mL 0    STATIN NOT PRESCRIBED (INTENTIONAL) Previous liver issues, risks vs benefits felt to not warrant statin.    Discussed Oct 2022 visit      SYNDROS 5 MG/ML oral soln TAKE 0.5 ML BY MOUTH 2 TIMES DAILY 60 mL 0    traZODone (DESYREL) 50 MG tablet Take 1 tablet (50 mg) by mouth at bedtime. 30 tablet 1    zinc oxide - white petrolatum (CRITIC-AID THICK MOIST BARRIER) 20-51% PSTE topical paste Apply 71 g topically every hour as needed for rash (Under J tube bumper when needed for skin protection) 170 g 0    ZOLMitriptan (ZOMIG-ZMT) 5 MG ODT Take 1 tablet (5 mg) by mouth at onset of headache for migraine Dissolve tablet in the mouth. May repeat in 2 hours. Max 2 tablets/24 hours. 12 tablet 6       Allergies:  Allergies   Allergen Reactions    Apple Juice Anaphylaxis    Corticosteroids Other (See Comments)     All oral, IV and injectable steroids are contraindicated (unless in life threatening situations) in [facility] Auto transplant recipients. They can cause irreversible loss of islet cell function. Please contact  patient's transplant care coordinator ADI Gaffney RN at [phone]/pager [phone] and/or endocrinologist prior to administration.      Depakote [Divalproex Sodium] Other (See Comments)     Chest pain    Zithromax [Azithromycin Dihydrate] Anaphylaxis     Noted in 4/7/08 ER    Bromfenac      Other reaction(s): Headache    Codeine      Other reaction(s): Hallucinations    Darvocet [Propoxyphene N-Apap] Itching    Morphine Nausea and Vomiting and Rash    Nalbuphine Hcl Rash     RASH :nubaine    Bactrim [Sulfamethoxazole W-Trimethoprim] Other (See Comments) and Nausea and Vomiting     Severely low liver function.    Statins Other (See Comments)     Previous liver issues, risks vs benefits felt to not warrant statin.  Discussed Oct 2022 visit    Tape [Adhesive Tape] Blisters    Tramadol     Zofran [Ondansetron] Other (See Comments)     migraine    Flagyl [Metronidazole] Hives and Rash       Family history:  Family History   Problem Relation Age of Onset    Lipids Mother     Hypertension Mother     Thyroid Disease Mother     Depression Mother     Angina Mother     GERD Mother     Skin Cancer Mother     Migraines Mother     Autoimmune Disease Mother     Hyperlipidemia Mother     Mental Illness Mother     Cerebrovascular Disease Mother         11/2023    Other Cancer Mother         skin-basil cell    Anxiety Disorder Mother     Post Operative Nausea and Vomiting Mother     Eye Disorder Father         cataract, detached retina    Myocardial Infarction Father 60    Lipids Father     Cerebrovascular Disease Father     Depression Father     Substance Abuse Father     Anesthesia Reaction Father         stroke right after surgery    Cataracts Father     Osteoarthritis Father     Ulcerative Colitis Father     Autoimmune Disease Father     Heart Disease Father     Hyperlipidemia Father     Mental Illness Father     Other Cancer Father         myloma    Anxiety Disorder Father     Interpersonal Violence Sister     Coronary  Artery Disease Sister         angioplasty    GERD Sister     Substance Abuse Sister     Cerebrovascular Disease Sister         2005    Depression Sister     Thyroid Disease Sister     Autoimmune Disease Sister     Depression Sister     Mental Illness Sister     Substance Abuse Sister     Thyroid Disease Sister     Eye Disorder Maternal Grandmother         cataract    Thyroid Disease Maternal Grandmother     Diabetes Maternal Grandfather     Eye Disorder Paternal Grandmother         cataract    Diabetes Paternal Grandmother     Eye Disorder Paternal Grandfather         cataract    Diabetes Paternal Grandfather     Substance Abuse Paternal Grandfather     Eye Disorder Son         ptosis    Depression Son     Anxiety Disorder Son     Depression Son     Mental Illness Son     Anxiety Disorder Son     Heart Disease Paternal Aunt     Diabetes Paternal Aunt     Diabetes Paternal Uncle     Heart Disease Paternal Uncle     Depression Nephew     Anxiety Disorder Nephew     Thyroid Disease Nephew     Thyroid Disease Nephew     Anxiety Disorder Nephew     Diabetes Type 2  Cousin         paternal cousin    Autoimmune Disease Cousin        Social history:  Social History     Socioeconomic History    Marital status:      Spouse name: Norris    Number of children: 3    Years of education: 16    Highest education level: Not on file   Occupational History    Occupation: director     Employer: [employer]   Tobacco Use    Smoking status: Former     Current packs/day: 0.00     Average packs/day: 0.5 packs/day for 6.3 years (3.2 ttl pk-yrs)     Types: Cigarettes     Start date: 1985     Quit date: 1992     Years since quittin.9    Smokeless tobacco: Not on file    Tobacco comments:     no 2nd hand   Vaping Use    Vaping status: Some Days    Substances: THC, CBD   Substance and Sexual Activity    Alcohol use: Not Currently     Alcohol/week: 3.0 - 6.0 standard drinks of alcohol     Types: 1 - 2 Glasses of wine, 1 - 2 Cans of  beer, 1 - 2 Shots of liquor per week     Comment: none since IGG    Drug use: Yes     Comment: medical canabis tincture and vape    Sexual activity: Yes     Partners: Male     Birth control/protection: Male Surgical, Female Surgical, None     Comment: Hystectomy 1999   Other Topics Concern    Parent/sibling w/ CABG, MI or angioplasty before 65F 55M? No     Service Not Asked    Blood Transfusions No    Caffeine Concern Not Asked    Occupational Exposure Not Asked    Hobby Hazards Not Asked    Sleep Concern Not Asked    Stress Concern Not Asked    Weight Concern Not Asked    Special Diet Not Asked    Back Care Not Asked    Exercise Not Asked    Bike Helmet Not Asked    Seat Belt Yes    Self-Exams Not Asked   Social History Narrative     with 3 children and a dog.  No smoking, etoh or drug use.  Worked as a  for [employer] in [place] in the past.  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     Social Drivers of Health     Financial Resource Strain: Low Risk  (11/18/2024)    Financial Resource Strain     Within the past 12 months, have you or your family members you live with been unable to get utilities (heat, electricity) when it was really needed?: No   Food Insecurity: Low Risk  (11/18/2024)    Food Insecurity     Within the past 12 months, did you worry that your food would run out before you got money to buy more?: No     Within the past 12 months, did the food you bought just not last and you didn t have money to get more?: No   Transportation Needs: Low Risk  (11/18/2024)    Transportation Needs     Within the past 12 months, has lack of transportation kept you from medical appointments, getting your medicines, non-medical meetings or appointments, work, or from getting things that you need?: No   Physical Activity: Sufficiently Active (3/19/2024)    Exercise Vital Sign     Days of Exercise per Week: 4 days     Minutes of Exercise per Session: 40  min   Stress: Stress Concern Present (3/19/2024)    [employer] [place] [employer] - Occupational Stress Questionnaire     Feeling of Stress : To some extent   Social Connections: Unknown (3/19/2024)    Social Connection and Isolation Panel [NHANES]     Frequency of Communication with Friends and Family: Not on file     Frequency of Social Gatherings with Friends and Family: Once a week     Attends Holiness Services: Not on file     Active Member of Clubs or Organizations: Not on file     Attends Club or Organization Meetings: Not on file     Marital Status: Not on file   Interpersonal Safety: Low Risk  (10/31/2024)    Interpersonal Safety     Do you feel physically and emotionally safe where you currently live?: Yes     Within the past 12 months, have you been hit, slapped, kicked or otherwise physically hurt by someone?: No     Within the past 12 months, have you been humiliated or emotionally abused in other ways by your partner or ex-partner?: No   Housing Stability: Low Risk  (11/18/2024)    Housing Stability     Do you have housing? : Yes     Are you worried about losing your housing?: No       Physical Examination:  BP (!) 83/55 (BP Location: Left arm, Patient Position: Sitting, Cuff Size: Adult Regular)   Pulse 114   SpO2 98%   General: alert, oriented, in no acute distress, sitting comfortably  Respiratory: non-labored breathing on RA  Abdomen: soft, non-distended, incision well healed, G tube in place. J tube site macerated and zinc barrier cream and Mepilex applied. Loop ileostomy on right side with liquid stool in bag and pouch with good seal.

## 2024-11-13 DIAGNOSIS — K31.84 GASTROPARESIS: ICD-10-CM

## 2024-11-14 ENCOUNTER — HOME INFUSION BILLING (OUTPATIENT)
Dept: HOME HEALTH SERVICES | Facility: HOME HEALTH | Age: 59
End: 2024-11-14
Payer: COMMERCIAL

## 2024-11-14 ENCOUNTER — DOCUMENTATION ONLY (OUTPATIENT)
Dept: INTERNAL MEDICINE | Facility: CLINIC | Age: 59
End: 2024-11-14
Payer: COMMERCIAL

## 2024-11-14 NOTE — PROGRESS NOTES
Type of Form Received: [facility] 1523    Form Received (Date) 11/12/24   Form Filled out Yes, faxed 11/18/24   Placed in provider folder Yes

## 2024-11-16 RX ORDER — FAMOTIDINE 40 MG/5ML
20 POWDER, FOR SUSPENSION ORAL EVERY EVENING
Qty: 100 ML | Refills: 1 | Status: SHIPPED | OUTPATIENT
Start: 2024-11-16

## 2024-11-17 ENCOUNTER — HOSPITAL ENCOUNTER (OUTPATIENT)
Facility: CLINIC | Age: 59
Setting detail: OBSERVATION
Discharge: HOME | End: 2024-11-19
Attending: EMERGENCY MEDICINE | Admitting: HOSPITALIST
Payer: COMMERCIAL

## 2024-11-17 DIAGNOSIS — R10.84 ABDOMINAL PAIN, GENERALIZED: Primary | ICD-10-CM

## 2024-11-17 DIAGNOSIS — F43.21 SITUATIONAL DEPRESSION: ICD-10-CM

## 2024-11-17 DIAGNOSIS — G89.18 ACUTE POST-OPERATIVE PAIN: ICD-10-CM

## 2024-11-17 DIAGNOSIS — T85.528A DISLODGED JEJUNOSTOMY TUBE: ICD-10-CM

## 2024-11-17 DIAGNOSIS — R10.9 ABDOMINAL PAIN, UNSPECIFIED ABDOMINAL LOCATION: ICD-10-CM

## 2024-11-17 DIAGNOSIS — H10.10 SEASONAL ALLERGIC CONJUNCTIVITIS: ICD-10-CM

## 2024-11-17 DIAGNOSIS — E03.9 HYPOTHYROIDISM, UNSPECIFIED TYPE: ICD-10-CM

## 2024-11-17 LAB
ALBUMIN SERPL BCG-MCNC: 3.8 G/DL (ref 3.5–5.2)
ALP SERPL-CCNC: 265 U/L (ref 40–150)
ALT SERPL W P-5'-P-CCNC: 48 U/L (ref 0–50)
ANION GAP SERPL CALCULATED.3IONS-SCNC: 11 MMOL/L (ref 7–15)
AST SERPL W P-5'-P-CCNC: 30 U/L (ref 0–45)
BASOPHILS # BLD AUTO: 0.1 10E3/UL (ref 0–0.2)
BASOPHILS NFR BLD AUTO: 1 %
BILIRUB SERPL-MCNC: 0.2 MG/DL
BUN SERPL-MCNC: 12.1 MG/DL (ref 6–20)
CALCIUM SERPL-MCNC: 9.2 MG/DL (ref 8.8–10.4)
CHLORIDE SERPL-SCNC: 103 MMOL/L (ref 98–107)
CREAT SERPL-MCNC: 0.78 MG/DL (ref 0.51–0.95)
EGFRCR SERPLBLD CKD-EPI 2021: 88 ML/MIN/1.73M2
EOSINOPHIL # BLD AUTO: 1.1 10E3/UL (ref 0–0.7)
EOSINOPHIL NFR BLD AUTO: 9 %
ERYTHROCYTE [DISTWIDTH] IN BLOOD BY AUTOMATED COUNT: 13.4 % (ref 10–15)
ERYTHROCYTE [SEDIMENTATION RATE] IN BLOOD BY WESTERGREN METHOD: 47 MM/HR (ref 0–30)
GLUCOSE BLDC GLUCOMTR-MCNC: 120 MG/DL (ref 70–99)
GLUCOSE SERPL-MCNC: 51 MG/DL (ref 70–99)
HCO3 SERPL-SCNC: 25 MMOL/L (ref 22–29)
HCT VFR BLD AUTO: 31.5 % (ref 35–47)
HGB BLD-MCNC: 9.6 G/DL (ref 11.7–15.7)
IMM GRANULOCYTES # BLD: 0 10E3/UL
IMM GRANULOCYTES NFR BLD: 0 %
LACTATE SERPL-SCNC: 0.8 MMOL/L (ref 0.7–2)
LIPASE SERPL-CCNC: 6 U/L (ref 13–60)
LYMPHOCYTES # BLD AUTO: 3.3 10E3/UL (ref 0.8–5.3)
LYMPHOCYTES NFR BLD AUTO: 27 %
MCH RBC QN AUTO: 28.4 PG (ref 26.5–33)
MCHC RBC AUTO-ENTMCNC: 30.5 G/DL (ref 31.5–36.5)
MCV RBC AUTO: 93 FL (ref 78–100)
MONOCYTES # BLD AUTO: 1.6 10E3/UL (ref 0–1.3)
MONOCYTES NFR BLD AUTO: 13 %
NEUTROPHILS # BLD AUTO: 5.9 10E3/UL (ref 1.6–8.3)
NEUTROPHILS NFR BLD AUTO: 50 %
NRBC # BLD AUTO: 0 10E3/UL
NRBC BLD AUTO-RTO: 0 /100
PLATELET # BLD AUTO: 600 10E3/UL (ref 150–450)
POTASSIUM SERPL-SCNC: 4.1 MMOL/L (ref 3.4–5.3)
PROT SERPL-MCNC: 7.1 G/DL (ref 6.4–8.3)
RBC # BLD AUTO: 3.38 10E6/UL (ref 3.8–5.2)
SODIUM SERPL-SCNC: 139 MMOL/L (ref 135–145)
WBC # BLD AUTO: 12 10E3/UL (ref 4–11)

## 2024-11-17 PROCEDURE — 99285 EMERGENCY DEPT VISIT HI MDM: CPT | Mod: 25 | Performed by: EMERGENCY MEDICINE

## 2024-11-17 PROCEDURE — 85048 AUTOMATED LEUKOCYTE COUNT: CPT | Performed by: EMERGENCY MEDICINE

## 2024-11-17 PROCEDURE — 83690 ASSAY OF LIPASE: CPT | Performed by: EMERGENCY MEDICINE

## 2024-11-17 PROCEDURE — 36415 COLL VENOUS BLD VENIPUNCTURE: CPT | Performed by: EMERGENCY MEDICINE

## 2024-11-17 PROCEDURE — 96375 TX/PRO/DX INJ NEW DRUG ADDON: CPT | Performed by: EMERGENCY MEDICINE

## 2024-11-17 PROCEDURE — 80053 COMPREHEN METABOLIC PANEL: CPT | Performed by: EMERGENCY MEDICINE

## 2024-11-17 PROCEDURE — 85025 COMPLETE CBC W/AUTO DIFF WBC: CPT | Performed by: EMERGENCY MEDICINE

## 2024-11-17 PROCEDURE — 82962 GLUCOSE BLOOD TEST: CPT

## 2024-11-17 PROCEDURE — 250N000011 HC RX IP 250 OP 636: Performed by: EMERGENCY MEDICINE

## 2024-11-17 PROCEDURE — 85652 RBC SED RATE AUTOMATED: CPT | Performed by: EMERGENCY MEDICINE

## 2024-11-17 PROCEDURE — 99285 EMERGENCY DEPT VISIT HI MDM: CPT | Performed by: EMERGENCY MEDICINE

## 2024-11-17 PROCEDURE — 96374 THER/PROPH/DIAG INJ IV PUSH: CPT | Mod: 59 | Performed by: EMERGENCY MEDICINE

## 2024-11-17 PROCEDURE — 86140 C-REACTIVE PROTEIN: CPT | Performed by: EMERGENCY MEDICINE

## 2024-11-17 PROCEDURE — 83605 ASSAY OF LACTIC ACID: CPT | Performed by: EMERGENCY MEDICINE

## 2024-11-17 RX ORDER — IOPAMIDOL 755 MG/ML
86 INJECTION, SOLUTION INTRAVASCULAR ONCE
Status: DISCONTINUED | OUTPATIENT
Start: 2024-11-18 | End: 2024-11-18

## 2024-11-17 RX ORDER — HYDROMORPHONE HYDROCHLORIDE 1 MG/ML
0.5 INJECTION, SOLUTION INTRAMUSCULAR; INTRAVENOUS; SUBCUTANEOUS EVERY 30 MIN PRN
Status: DISCONTINUED | OUTPATIENT
Start: 2024-11-17 | End: 2024-11-18

## 2024-11-17 RX ADMIN — HYDROMORPHONE HYDROCHLORIDE 0.5 MG: 1 INJECTION, SOLUTION INTRAMUSCULAR; INTRAVENOUS; SUBCUTANEOUS at 23:16

## 2024-11-17 RX ADMIN — PROCHLORPERAZINE EDISYLATE 10 MG: 5 INJECTION INTRAMUSCULAR; INTRAVENOUS at 23:16

## 2024-11-17 ASSESSMENT — ACTIVITIES OF DAILY LIVING (ADL): ADLS_ACUITY_SCORE: 0

## 2024-11-17 ASSESSMENT — COLUMBIA-SUICIDE SEVERITY RATING SCALE - C-SSRS
6. HAVE YOU EVER DONE ANYTHING, STARTED TO DO ANYTHING, OR PREPARED TO DO ANYTHING TO END YOUR LIFE?: NO
1. IN THE PAST MONTH, HAVE YOU WISHED YOU WERE DEAD OR WISHED YOU COULD GO TO SLEEP AND NOT WAKE UP?: NO
2. HAVE YOU ACTUALLY HAD ANY THOUGHTS OF KILLING YOURSELF IN THE PAST MONTH?: NO

## 2024-11-17 NOTE — Clinical Note
Transition Communication Hand-off for Care Transitions to Next Level of Care Provider    Name: Dinora Mcghee  : 1965  MRN #: [id number]  Primary Care Provider: Juan Jose Gomez     Primary Clinic: [address] [place] [id number]     Reason for Hospitalization:  Dislodged jejunostomy tube [T85.528A]  Abdominal pain, unspecified abdominal location [R10.9]  Admit Date/Time: 2024 10:28 PM  Discharge Date: ***  Payor Source: Payor: BCBS / Plan: [place] [place] / Product Type: Indemnity /     Readmission Assessment Measure (MJ) Risk Score/category: ***    Plan of Care Goals/Milestone Events:   Patient Concerns: ***   Patient Goals:   Short-term ***   Long-term ***   Medical Goals   Short-term ***   Long-term ***         Reason for Communication Hand-off Referral: {CCREASONCTNHANDOFFREFERRALCTS:33451}    Discharge Plan:       Concern for non-adherence with plan of care:   Y/N ***  Discharge Needs Assessment:  Needs    Flowsheet Row Most Recent Value   Anticipated Changes Related to Illness other (see comments)   Equipment Currently Used at Home none          Already enrolled in Tele-monitoring program and name of program:  ***  Follow-up specialty is recommended: {YES / NO:18135}    Follow-up plan:  Future Appointments   Date Time Provider Department Center   2024  8:00 AM Vijaya Genao MD [facility]   2024  6:00 PM Kaylene Branch MD [facility]   2024  9:30 AM Aleyda Ceja RN [facility]   2024  1:00 PM Gloria Melissa MD [facility]   2025 12:00 PM Juan Jose Gomez MD [facility]   2025  2:30 PM Rachelle العلي APRN [facility]   2025  2:20 PM Gloria Melissa MD [facility]   3/21/2025  4:00 PM Juan Jose Gomez MD [facility]   2025 10:30 AM Rachelle العلي APRN [facility]       Any outstanding tests or procedures:              Key Recommendations:      Elzbieta Rodriguez,  MSW    AVS/Discharge Summary is the source of truth; this is a helpful guide for improved communication of patient story

## 2024-11-18 ENCOUNTER — HOME INFUSION (OUTPATIENT)
Dept: HOME HEALTH SERVICES | Facility: HOME HEALTH | Age: 59
End: 2024-11-18
Payer: COMMERCIAL

## 2024-11-18 ENCOUNTER — APPOINTMENT (OUTPATIENT)
Dept: CT IMAGING | Facility: CLINIC | Age: 59
End: 2024-11-18
Attending: EMERGENCY MEDICINE
Payer: COMMERCIAL

## 2024-11-18 ENCOUNTER — APPOINTMENT (OUTPATIENT)
Dept: GENERAL RADIOLOGY | Facility: CLINIC | Age: 59
End: 2024-11-18
Payer: COMMERCIAL

## 2024-11-18 DIAGNOSIS — E43 UNSPECIFIED SEVERE PROTEIN-CALORIE MALNUTRITION (H): Primary | ICD-10-CM

## 2024-11-18 DIAGNOSIS — K31.84 GASTROPARESIS: ICD-10-CM

## 2024-11-18 PROBLEM — T85.528A DISLODGED JEJUNOSTOMY TUBE: Status: ACTIVE | Noted: 2024-11-18

## 2024-11-18 LAB
ANION GAP SERPL CALCULATED.3IONS-SCNC: 11 MMOL/L (ref 7–15)
BUN SERPL-MCNC: 15.4 MG/DL (ref 6–20)
CALCIUM SERPL-MCNC: 8.5 MG/DL (ref 8.8–10.4)
CHLORIDE SERPL-SCNC: 104 MMOL/L (ref 98–107)
CREAT SERPL-MCNC: 0.64 MG/DL (ref 0.51–0.95)
CRP SERPL-MCNC: 34.5 MG/L
EGFRCR SERPLBLD CKD-EPI 2021: >90 ML/MIN/1.73M2
ERYTHROCYTE [DISTWIDTH] IN BLOOD BY AUTOMATED COUNT: 13.3 % (ref 10–15)
GLUCOSE BLDC GLUCOMTR-MCNC: 100 MG/DL (ref 70–99)
GLUCOSE BLDC GLUCOMTR-MCNC: 117 MG/DL (ref 70–99)
GLUCOSE BLDC GLUCOMTR-MCNC: 132 MG/DL (ref 70–99)
GLUCOSE BLDC GLUCOMTR-MCNC: 135 MG/DL (ref 70–99)
GLUCOSE BLDC GLUCOMTR-MCNC: 162 MG/DL (ref 70–99)
GLUCOSE SERPL-MCNC: 106 MG/DL (ref 70–99)
HCO3 SERPL-SCNC: 22 MMOL/L (ref 22–29)
HCT VFR BLD AUTO: 29.4 % (ref 35–47)
HGB BLD-MCNC: 9.1 G/DL (ref 11.7–15.7)
MCH RBC QN AUTO: 28.4 PG (ref 26.5–33)
MCHC RBC AUTO-ENTMCNC: 31 G/DL (ref 31.5–36.5)
MCV RBC AUTO: 92 FL (ref 78–100)
PLATELET # BLD AUTO: 589 10E3/UL (ref 150–450)
POTASSIUM SERPL-SCNC: 5.2 MMOL/L (ref 3.4–5.3)
RBC # BLD AUTO: 3.2 10E6/UL (ref 3.8–5.2)
SODIUM SERPL-SCNC: 137 MMOL/L (ref 135–145)
WBC # BLD AUTO: 10.5 10E3/UL (ref 4–11)

## 2024-11-18 PROCEDURE — 96376 TX/PRO/DX INJ SAME DRUG ADON: CPT | Performed by: EMERGENCY MEDICINE

## 2024-11-18 PROCEDURE — 74018 RADEX ABDOMEN 1 VIEW: CPT

## 2024-11-18 PROCEDURE — 96375 TX/PRO/DX INJ NEW DRUG ADDON: CPT

## 2024-11-18 PROCEDURE — 250N000011 HC RX IP 250 OP 636

## 2024-11-18 PROCEDURE — 99207 PR NO BILLABLE SERVICE THIS VISIT: CPT

## 2024-11-18 PROCEDURE — 99418 PROLNG IP/OBS E/M EA 15 MIN: CPT | Mod: 24 | Performed by: PHYSICIAN ASSISTANT

## 2024-11-18 PROCEDURE — G0378 HOSPITAL OBSERVATION PER HR: HCPCS

## 2024-11-18 PROCEDURE — 74177 CT ABD & PELVIS W/CONTRAST: CPT

## 2024-11-18 PROCEDURE — 250N000011 HC RX IP 250 OP 636: Performed by: EMERGENCY MEDICINE

## 2024-11-18 PROCEDURE — 96376 TX/PRO/DX INJ SAME DRUG ADON: CPT

## 2024-11-18 PROCEDURE — 99255 IP/OBS CONSLTJ NEW/EST HI 80: CPT | Mod: 24 | Performed by: PHYSICIAN ASSISTANT

## 2024-11-18 PROCEDURE — 250N000013 HC RX MED GY IP 250 OP 250 PS 637

## 2024-11-18 PROCEDURE — 85018 HEMOGLOBIN: CPT

## 2024-11-18 PROCEDURE — 99223 1ST HOSP IP/OBS HIGH 75: CPT | Mod: GC | Performed by: STUDENT IN AN ORGANIZED HEALTH CARE EDUCATION/TRAINING PROGRAM

## 2024-11-18 PROCEDURE — 85041 AUTOMATED RBC COUNT: CPT

## 2024-11-18 PROCEDURE — 82962 GLUCOSE BLOOD TEST: CPT

## 2024-11-18 PROCEDURE — 250N000011 HC RX IP 250 OP 636: Performed by: PHYSICIAN ASSISTANT

## 2024-11-18 PROCEDURE — 36591 DRAW BLOOD OFF VENOUS DEVICE: CPT

## 2024-11-18 PROCEDURE — 80048 BASIC METABOLIC PNL TOTAL CA: CPT

## 2024-11-18 PROCEDURE — 74177 CT ABD & PELVIS W/CONTRAST: CPT | Mod: 26 | Performed by: RADIOLOGY

## 2024-11-18 RX ORDER — FAMOTIDINE 40 MG/5ML
20 POWDER, FOR SUSPENSION ORAL EVERY EVENING
Status: DISCONTINUED | OUTPATIENT
Start: 2024-11-18 | End: 2024-11-18

## 2024-11-18 RX ORDER — CETIRIZINE HYDROCHLORIDE 10 MG/1
10 TABLET ORAL EVERY MORNING
Status: DISCONTINUED | OUTPATIENT
Start: 2024-11-18 | End: 2024-11-18

## 2024-11-18 RX ORDER — DIPHENHYDRAMINE HYDROCHLORIDE 50 MG/ML
50 INJECTION INTRAMUSCULAR; INTRAVENOUS ONCE
Status: COMPLETED | OUTPATIENT
Start: 2024-11-18 | End: 2024-11-18

## 2024-11-18 RX ORDER — ACETAMINOPHEN 650 MG/1
650 SUPPOSITORY RECTAL EVERY 4 HOURS PRN
Status: DISCONTINUED | OUTPATIENT
Start: 2024-11-18 | End: 2024-11-19 | Stop reason: HOSPADM

## 2024-11-18 RX ORDER — AMOXICILLIN 250 MG
2 CAPSULE ORAL 2 TIMES DAILY PRN
Status: DISCONTINUED | OUTPATIENT
Start: 2024-11-18 | End: 2024-11-19 | Stop reason: HOSPADM

## 2024-11-18 RX ORDER — ZOLMITRIPTAN 2.5 MG/1
5 TABLET, ORALLY DISINTEGRATING ORAL
Status: DISCONTINUED | OUTPATIENT
Start: 2024-11-18 | End: 2024-11-19 | Stop reason: HOSPADM

## 2024-11-18 RX ORDER — METOCLOPRAMIDE HYDROCHLORIDE 5 MG/ML
5 INJECTION INTRAMUSCULAR; INTRAVENOUS ONCE
Status: COMPLETED | OUTPATIENT
Start: 2024-11-18 | End: 2024-11-18

## 2024-11-18 RX ORDER — LIDOCAINE 40 MG/G
CREAM TOPICAL
Status: CANCELLED | OUTPATIENT
Start: 2024-11-18

## 2024-11-18 RX ORDER — ESTRADIOL 10 UG/1
10 INSERT VAGINAL
Status: DISCONTINUED | OUTPATIENT
Start: 2024-11-18 | End: 2024-11-19 | Stop reason: HOSPADM

## 2024-11-18 RX ORDER — NALOXONE HYDROCHLORIDE 0.4 MG/ML
0.4 INJECTION, SOLUTION INTRAMUSCULAR; INTRAVENOUS; SUBCUTANEOUS
Status: DISCONTINUED | OUTPATIENT
Start: 2024-11-18 | End: 2024-11-19 | Stop reason: HOSPADM

## 2024-11-18 RX ORDER — METHOCARBAMOL 750 MG/1
750 TABLET, FILM COATED ORAL 4 TIMES DAILY PRN
Status: DISCONTINUED | OUTPATIENT
Start: 2024-11-18 | End: 2024-11-19 | Stop reason: HOSPADM

## 2024-11-18 RX ORDER — TRAZODONE HYDROCHLORIDE 50 MG/1
50 TABLET, FILM COATED ORAL AT BEDTIME
Status: DISCONTINUED | OUTPATIENT
Start: 2024-11-18 | End: 2024-11-19 | Stop reason: HOSPADM

## 2024-11-18 RX ORDER — BACLOFEN 10 MG/1
10-20 TABLET ORAL 3 TIMES DAILY
Status: DISCONTINUED | OUTPATIENT
Start: 2024-11-18 | End: 2024-11-19 | Stop reason: HOSPADM

## 2024-11-18 RX ORDER — ACETAMINOPHEN 325 MG/1
650 TABLET ORAL EVERY 4 HOURS PRN
Status: DISCONTINUED | OUTPATIENT
Start: 2024-11-18 | End: 2024-11-19 | Stop reason: HOSPADM

## 2024-11-18 RX ORDER — AMOXICILLIN 250 MG
1 CAPSULE ORAL 2 TIMES DAILY PRN
Status: DISCONTINUED | OUTPATIENT
Start: 2024-11-18 | End: 2024-11-19 | Stop reason: HOSPADM

## 2024-11-18 RX ORDER — AMITRIPTYLINE HCL 25 MG
40 TABLET ORAL AT BEDTIME
Status: DISCONTINUED | OUTPATIENT
Start: 2024-11-18 | End: 2024-11-19 | Stop reason: HOSPADM

## 2024-11-18 RX ORDER — CETIRIZINE HYDROCHLORIDE 10 MG/1
10 TABLET ORAL EVERY MORNING
Status: DISCONTINUED | OUTPATIENT
Start: 2024-11-19 | End: 2024-11-19 | Stop reason: HOSPADM

## 2024-11-18 RX ORDER — BACLOFEN 10 MG/1
10-20 TABLET ORAL 3 TIMES DAILY
Status: DISCONTINUED | OUTPATIENT
Start: 2024-11-18 | End: 2024-11-18

## 2024-11-18 RX ORDER — NICOTINE POLACRILEX 4 MG
15-30 LOZENGE BUCCAL
Status: DISCONTINUED | OUTPATIENT
Start: 2024-11-18 | End: 2024-11-19 | Stop reason: HOSPADM

## 2024-11-18 RX ORDER — OXYCODONE HYDROCHLORIDE 10 MG/1
10 TABLET ORAL EVERY 6 HOURS PRN
Status: DISCONTINUED | OUTPATIENT
Start: 2024-11-18 | End: 2024-11-19 | Stop reason: HOSPADM

## 2024-11-18 RX ORDER — NALOXONE HYDROCHLORIDE 0.4 MG/ML
0.2 INJECTION, SOLUTION INTRAMUSCULAR; INTRAVENOUS; SUBCUTANEOUS
Status: DISCONTINUED | OUTPATIENT
Start: 2024-11-18 | End: 2024-11-19 | Stop reason: HOSPADM

## 2024-11-18 RX ORDER — MONTELUKAST SODIUM 10 MG/1
10 TABLET ORAL AT BEDTIME
Status: DISCONTINUED | OUTPATIENT
Start: 2024-11-18 | End: 2024-11-19 | Stop reason: HOSPADM

## 2024-11-18 RX ORDER — PROCHLORPERAZINE MALEATE 10 MG
10 TABLET ORAL EVERY 6 HOURS PRN
Status: DISCONTINUED | OUTPATIENT
Start: 2024-11-18 | End: 2024-11-18

## 2024-11-18 RX ORDER — DIPHENHYDRAMINE HCL 12.5MG/5ML
25 LIQUID (ML) ORAL 4 TIMES DAILY PRN
Status: DISCONTINUED | OUTPATIENT
Start: 2024-11-18 | End: 2024-11-18

## 2024-11-18 RX ORDER — OXYCODONE HYDROCHLORIDE 5 MG/1
5 TABLET ORAL EVERY 6 HOURS PRN
Status: DISCONTINUED | OUTPATIENT
Start: 2024-11-18 | End: 2024-11-19 | Stop reason: HOSPADM

## 2024-11-18 RX ORDER — LIDOCAINE 40 MG/G
CREAM TOPICAL
Status: DISCONTINUED | OUTPATIENT
Start: 2024-11-18 | End: 2024-11-19 | Stop reason: HOSPADM

## 2024-11-18 RX ORDER — DULOXETIN HYDROCHLORIDE 60 MG/1
60 CAPSULE, DELAYED RELEASE ORAL EVERY EVENING
Status: DISCONTINUED | OUTPATIENT
Start: 2024-11-18 | End: 2024-11-19 | Stop reason: HOSPADM

## 2024-11-18 RX ORDER — DEXTROSE MONOHYDRATE 25 G/50ML
25-50 INJECTION, SOLUTION INTRAVENOUS
Status: DISCONTINUED | OUTPATIENT
Start: 2024-11-18 | End: 2024-11-19 | Stop reason: HOSPADM

## 2024-11-18 RX ORDER — CYCLOBENZAPRINE HCL 5 MG
5-10 TABLET ORAL EVERY 8 HOURS PRN
Status: DISCONTINUED | OUTPATIENT
Start: 2024-11-18 | End: 2024-11-19 | Stop reason: HOSPADM

## 2024-11-18 RX ORDER — HYDROMORPHONE HYDROCHLORIDE 1 MG/ML
0.5 INJECTION, SOLUTION INTRAMUSCULAR; INTRAVENOUS; SUBCUTANEOUS EVERY 4 HOURS PRN
Status: DISCONTINUED | OUTPATIENT
Start: 2024-11-18 | End: 2024-11-19 | Stop reason: HOSPADM

## 2024-11-18 RX ORDER — DIPHENHYDRAMINE HCL 12.5MG/5ML
25 LIQUID (ML) ORAL DAILY PRN
Status: DISCONTINUED | OUTPATIENT
Start: 2024-11-18 | End: 2024-11-19 | Stop reason: HOSPADM

## 2024-11-18 RX ORDER — DULOXETIN HYDROCHLORIDE 60 MG/1
60 CAPSULE, DELAYED RELEASE ORAL EVERY EVENING
Status: DISCONTINUED | OUTPATIENT
Start: 2024-11-18 | End: 2024-11-18

## 2024-11-18 RX ORDER — HYDROXYZINE HYDROCHLORIDE 25 MG/1
25 TABLET, FILM COATED ORAL EVERY 6 HOURS PRN
Status: DISCONTINUED | OUTPATIENT
Start: 2024-11-18 | End: 2024-11-19 | Stop reason: HOSPADM

## 2024-11-18 RX ADMIN — PROCHLORPERAZINE EDISYLATE 10 MG: 5 INJECTION INTRAMUSCULAR; INTRAVENOUS at 17:07

## 2024-11-18 RX ADMIN — HYDROMORPHONE HYDROCHLORIDE 0.5 MG: 1 INJECTION, SOLUTION INTRAMUSCULAR; INTRAVENOUS; SUBCUTANEOUS at 12:57

## 2024-11-18 RX ADMIN — HYDROMORPHONE HYDROCHLORIDE 0.5 MG: 1 INJECTION, SOLUTION INTRAMUSCULAR; INTRAVENOUS; SUBCUTANEOUS at 04:23

## 2024-11-18 RX ADMIN — FAMOTIDINE 20 MG: 10 INJECTION, SOLUTION INTRAVENOUS at 16:50

## 2024-11-18 RX ADMIN — HYDROXYZINE HYDROCHLORIDE 25 MG: 25 TABLET, FILM COATED ORAL at 23:01

## 2024-11-18 RX ADMIN — HYDROMORPHONE HYDROCHLORIDE 0.5 MG: 1 INJECTION, SOLUTION INTRAMUSCULAR; INTRAVENOUS; SUBCUTANEOUS at 01:49

## 2024-11-18 RX ADMIN — OXYCODONE HYDROCHLORIDE 10 MG: 10 TABLET ORAL at 23:01

## 2024-11-18 RX ADMIN — DIPHENHYDRAMINE HYDROCHLORIDE 50 MG: 50 INJECTION, SOLUTION INTRAMUSCULAR; INTRAVENOUS at 21:49

## 2024-11-18 RX ADMIN — METHOCARBAMOL TABLETS 750 MG: 750 TABLET, COATED ORAL at 23:01

## 2024-11-18 RX ADMIN — HYDROMORPHONE HYDROCHLORIDE 0.5 MG: 1 INJECTION, SOLUTION INTRAMUSCULAR; INTRAVENOUS; SUBCUTANEOUS at 08:33

## 2024-11-18 RX ADMIN — PROCHLORPERAZINE EDISYLATE 10 MG: 5 INJECTION INTRAMUSCULAR; INTRAVENOUS at 11:57

## 2024-11-18 RX ADMIN — ZOLMITRIPTAN 5 MG: 2.5 TABLET, ORALLY DISINTEGRATING ORAL at 12:49

## 2024-11-18 RX ADMIN — HYDROMORPHONE HYDROCHLORIDE 0.5 MG: 1 INJECTION, SOLUTION INTRAMUSCULAR; INTRAVENOUS; SUBCUTANEOUS at 00:57

## 2024-11-18 RX ADMIN — PROCHLORPERAZINE EDISYLATE 10 MG: 5 INJECTION INTRAMUSCULAR; INTRAVENOUS at 04:22

## 2024-11-18 RX ADMIN — METOCLOPRAMIDE HYDROCHLORIDE 5 MG: 5 INJECTION INTRAMUSCULAR; INTRAVENOUS at 21:47

## 2024-11-18 RX ADMIN — OXYCODONE HYDROCHLORIDE 5 MG: 5 TABLET ORAL at 11:20

## 2024-11-18 RX ADMIN — HYDROMORPHONE HYDROCHLORIDE 0.5 MG: 1 INJECTION, SOLUTION INTRAMUSCULAR; INTRAVENOUS; SUBCUTANEOUS at 17:07

## 2024-11-18 RX ADMIN — IOPAMIDOL 86 ML: 755 INJECTION, SOLUTION INTRAVENOUS at 00:17

## 2024-11-18 NOTE — CONSULTS
Advanced Endoscopy/Pancreaticobiliary Consultation      Date of Admission:  11/17/2024  Reason for Admission: PEJ fell out  Date of Consult  11/18/2024   Requesting Physician:  Ronald Omer,*           ASSESSMENT AND RECOMMENDATIONS:   Assessment:  59 year old female with a complex med/surg history including TPIAT 2016, severe gastroparesis with chronic N/V and malnutrition s/p PEG tube for venting, TF dependence via J tube, chronic intractable constipation now s/p total colectomy with end ileostomy Oct 2024. Patient presented to [facility] ED because her PEJ tube fell out.     #. Dislodged PEJ tube  #. Pain at PEJ site (resolved since tube is out)  #. TF and TPN dependent  Long discussion with patient regarding replacing the tube. She has been making great strides with her PO intake and has been able to start tapering down on her TPN as of ~3 weeks or so (TPN now 3x/week). Dietitian involved this admission and given these findings or marked oral intake improvement, she is agreeable to taking protein shakes PO. Per RD: if she is able to take 2 protein shakes per day it would make up the Kcal difference from her TFs (with her current intake of 2 cartons/evening).     We will not replace the PEJ at this time. The site will leak for a few days and this should be monitored closely to keep area as clean/dry as possible. Will consult WOCN today to evaluate and treat any existing skin break down and possibly to address some granulation tissue at the PEG site.     #. Gastroparesis s/p venting PEG  #. PEG malpositioned  Of note, she has also been using the PEG for venting drastically less (<10% of the time compared to 90% of the time prior to surgery). She will follow up in clinic in the future regarding potential removal of this as well.       Recommendations:  -- Defer replacement of PEJ at this time  -- OK for diet as tolerated, dietitian following (greatly appreciate input and communication with [facility] team)  -- Med  "admin can be switched from J tube to oral/G tube  -- WOCN consult ordered re: above  -- Analgesia/Antiemetics per primary team    Addendum 1545:   Notified by bedside RN at 1533 that patient is \"throwing up stool\" and feeling very nauseated/\"very sick\"  Complaining of 5/10 abdominal pain  Patient was not having any of these symptoms this morning  Relayed message to primary team  Rec: NPO, obtain AXR, antiemetics, PEG to gravity, monitor vitals  If ongoing symptoms overnight would obtain updated cross sectional imaging  An additional 15 minutes was spent on this patient today    Discussed with primary team Argentina Dc PA-C with hospitalist team    Gastroenterology follow up recommendations: TBD    Thank you for involving us in this patient's care. Please do not hesitate to contact the GI service with any questions or concerns.     Pt seen and care plan discussed with Dr. Park, GI staff physician.      Overall time spent on the date of this encounter preparing to see the patient (including chart review of available notes, clinical status events, imaging and labs); obtaining history; examining the patient, coordinating and/or ordering medications, tests and/or procedures; communicating with other health care professionals; and documenting the above clinical information in the electronic medical record was  100  minutes.      Shantell Dowling PA-C, [facility]  Advanced Endoscopy/Pancreaticobiliary GI Service  [facility]  Vocera   -------------------------------------------------------------------------------------------------------------------       Reason for Consultation:   PEJ fell out           History of Present Illness:     Dinora Mcghee is a 59 year old female with a PMH significant for TPIAT 2016, severe gastroparesis with chronic N/V and malnutrition s/p PEG tube for venting, TF dependence via J tube, chronic intractable constipation now s/p total colectomy with end ileostomy " Oct 2024. Patient presented to [facility] ED because her PEJ tube fell out.     In the ED on 11/17, vitals included T 98.2F, HR 92, /66, RR 18, O2 sat 99 on RA. Imaging obtained included CT scan abd/pelv with IV contrast (described below).     Patient seen and examined at 1110. The patient reports that she would love to have the PEJ removed completely and not replaced. She has been eating quite well since her colon surgery - way better than she expected. She is eating foods like potatoes and pork shops, egg sandwich, soup (low fiber/low roughage). She is only venting her PEG about 10% of the time which is compared to 90% of the time prior ot her colon surgery. She reports that she has constant pain at the PEJ site and now that it has fallen out, is finally without pain. She is developing a headache this morning, feels like a migraine. Patient otherwise denies chest pain, shortness of breath, new cough/respiratory symptoms, nausea, vomiting, melena, hematochezia, jaundice, rash, itching, weight loss, fevers/chills/sweats, or presyncope/syncope. She has been gaining weight.              Past Medical History:   Reviewed and edited as appropriate  Past Medical History:   Diagnosis Date    Allergic rhinitis, cause unspecified     Allergy to other foods     strawberries, apples, celeries, alice, watermelon    Arthritis     left knee    Choledocholithiasis     long after cholecystectomy    Chronic abdominal pain     Chronic constipation     Chronic nausea     Chronic pancreatitis (H)     Degeneration of lumbar or lumbosacral intervertebral disc     Esophageal reflux     w/ hiatal hernia    Gastroparesis     Hiatal hernia     History of pituitary adenoma     s/p resection    Hypothyroidism     Migraines     Mild hyperlipidemia     On tube feeding diet     presence of GJ tube    Pancreatic disease     PD stricture, 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 endometriosis growth    APPENDECTOMY       SECTION  1993    COLONOSCOPY  2014    COLONOSCOPY N/A 2023    Procedure: COLONOSCOPY, WITH POLYPECTOMY AND BIOPSY;  Surgeon: Percy Chamorro MD;  [facility]    ENDOSCOPIC INSERTION TUBE GASTROSTOMY N/A 2021    Procedure: Gastrojejonostomy placement with jejunopexy, PEG tube exchange;  Surgeon: Zackery Montoya MD;  [facility]    ENDOSCOPIC RETROGRADE CHOLANGIOPANCREATOGRAM N/A 2015    Procedure: ENDOSCOPIC RETROGRADE CHOLANGIOPANCREATOGRAM;  Surgeon: Mandeep Park MD;  [facility]    ENDOSCOPIC RETROGRADE CHOLANGIOPANCREATOGRAM N/A 2016    Procedure: COMBINED ENDOSCOPIC RETROGRADE CHOLANGIOPANCREATOGRAPHY, PLACE TUBE/STENT;  Surgeon: Mandeep Park MD;  [facility]    ENDOSCOPIC RETROGRADE CHOLANGIOPANCREATOGRAM N/A 2016    Procedure: COMBINED ENDOSCOPIC RETROGRADE CHOLANGIOPANCREATOGRAPHY, REMOVE FOREIGN BODY OR STENT/TUBE;  Surgeon: Mandeep Park MD;  [facility]    ENDOSCOPIC RETROGRADE CHOLANGIOPANCREATOGRAM N/A 2016    Procedure: COMBINED ENDOSCOPIC RETROGRADE CHOLANGIOPANCREATOGRAPHY, PLACE TUBE/STENT;  Surgeon: Mandeep Park MD;  [facility]    ENDOSCOPIC RETROGRADE CHOLANGIOPANCREATOGRAM N/A 2016    Procedure: COMBINED ENDOSCOPIC RETROGRADE CHOLANGIOPANCREATOGRAPHY, REMOVE FOREIGN BODY OR STENT/TUBE;  Surgeon: Mandeep Park MD;  [facility]    ENDOSCOPIC ULTRASOUND UPPER GASTROINTESTINAL TRACT (GI) N/A 10/03/2016    Procedure: ENDOSCOPIC ULTRASOUND, ESOPHAGOSCOPY / UPPER GASTROINTESTINAL TRACT (GI);  Surgeon: Adrianna  Guru Jose Benz MD;  [facility]    ENT SURGERY  1989    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    ENTEROSCOPY SMALL BOWEL N/A 02/03/2022    Procedure: ENTEROSCOPY with possible fistula closure;  Surgeon: Francisco Dodson MD;  Location:  GI    ENTEROSCOPY SMALL BOWEL N/A 9/11/2024    Procedure: Upper endoscopy, gastrostomy tube placement and jejunostomy tube exchange;  Surgeon: Zackery Montoya MD;  [facility]    ESOPHAGOSCOPY, GASTROSCOPY, DUODENOSCOPY (EGD), COMBINED N/A 06/24/2015    Procedure: COMBINED ESOPHAGOSCOPY, GASTROSCOPY, DUODENOSCOPY (EGD), REMOVE FOREIGN BODY;  Surgeon: Mandeep Park MD;  [facility]    ESOPHAGOSCOPY, GASTROSCOPY, DUODENOSCOPY (EGD), COMBINED N/A 10/25/2015    Procedure: COMBINED ESOPHAGOSCOPY, GASTROSCOPY, DUODENOSCOPY (EGD);  Surgeon: Sammy Amaro MD;  Location:  GI    ESOPHAGOSCOPY, GASTROSCOPY, DUODENOSCOPY (EGD), COMBINED N/A 10/25/2015    Procedure: COMBINED ESOPHAGOSCOPY, GASTROSCOPY, DUODENOSCOPY (EGD), BIOPSY SINGLE OR MULTIPLE;  Surgeon: Sammy Amaro MD;  Location:  GI    ESOPHAGOSCOPY, GASTROSCOPY, DUODENOSCOPY (EGD), COMBINED N/A 01/31/2023    Procedure: ESOPHAGOGASTRODUODENOSCOPY (EGD) with peg replacement ;  Surgeon: Mandeep Park MD;  [facility]    ESOPHAGOSCOPY, GASTROSCOPY, DUODENOSCOPY (EGD), COMBINED N/A 7/24/2024    Procedure: Esophagoscopy, gastroscopy, duodenoscopy (EGD), combined;  Surgeon: Zackery Montoya MD;  [facility]    ESOPHAGOSCOPY, GASTROSCOPY, DUODENOSCOPY (EGD), COMBINED N/A 10/31/2024    Procedure: Esophagoscopy, gastroscopy, duodenoscopy (EGD), combined;  Surgeon: Mandeep Park MD;  [facility]    ESOPHAGOSCOPY, GASTROSCOPY, DUODENOSCOPY (EGD), DILATATION, COMBINED      EXCISE LESION TRUNK N/A 04/17/2017    Procedure: EXCISE LESION TRUNK;  Removal of Abdominal Foreign Body;  Surgeon: Nestor Phoenix MD;  [facility]    HC ESOPH/GAS REFLUX TEST W NASAL  IMPED >1 HR N/A 11/19/2015    Procedure: ESOPHAGEAL IMPEDENCE FUNCTION TEST WITH 24 HOUR PH GREATER THAN 1 HOUR;  Surgeon: Thiago Apple MD;  [facility]     UGI ENDOSCOPY DIAG W BIOPSY  09/17/2008     UG[facility] ENDOSCOPY DIAG W BIOPSY  09/27/2012     UGI ENDOSCOPY W ESOPHAGEAL DILATION BALLOON <30MM  09/17/2008    HC UGI ENDOSCOPY W EUS N/A 05/05/2015    Procedure: COMBINED ENDOSCOPIC ULTRASOUND, ESOPHAGOSCOPY, GASTROSCOPY, DUODENOSCOPY (EGD);  Surgeon: Wm Dueñas MD;  [facility]     WRIST ARTHROSCOP,RELEASE XVERS LIG Bilateral 12/17/2008    INJECT TRANSVERSUS ABDOMINIS PLANE (TAP) BLOCK BILATERAL Left 09/22/2016    Procedure: INJECT TRANSVERSUS ABDOMINIS PLANE (TAP) BLOCK BILATERAL;  Surgeon: Dickson Corrigan MD;  [facility]    INJECT TRIGGER POINT Bilateral 09/08/2022    Procedure: Abdominal trigger point injection with ultrasound;  Surgeon: Monika Mahajan MD;  [facility]    INJECT TRIGGER POINT SINGLE / MULTIPLE 3 OR MORE MUSCLES Right 11/15/2022    Procedure: Trigger point injections right abdomen with ultrasound guidance;  Surgeon: Monika Mahajan MD;  [facility]    IR CHEST PORT PLACEMENT < 5 YRS OF AGE  06/10/2022    IR CVC TUNNEL PLACEMENT > 5 YRS OF AGE  4/15/2024    IR PORT REMOVAL RIGHT  11/09/2023    LAPAROSCOPIC ASSISTED COLECTOMY N/A 10/11/2024    Procedure: Exploratory laparotomy, extensive lysis of adhesions, revision of Jtube and small bowel resection, TOTAL ABDOMINAL COLECTOMY WITH ILEORECTAL ANASTAMOSIS, DIVERTING LOOP ILEOSTOMY, flexible sigmoidoscopy;  Surgeon: Kaylene Branch MD;  [facility]    laparoscopic pineda  01/01/1995    LAPAROSCOPIC HERNIORRHAPHY INCISIONAL N/A 08/23/2018    Procedure: LAPAROSCOPIC HERNIORRHAPHY INCISIONAL;  Laparoscopic Incisional Hernia Repair with Symbotex Mesh Implant;  Surgeon: Nestor Phoenix MD;  [facility]    LAPAROSCOPIC PANCREATECTOMY, TRANSPLANT AUTO ISLET CELL N/A 12/28/2016    Procedure:  LAPAROSCOPIC PANCREATECTOMY, TRANSPLANT AUTO ISLET CELL;  Surgeon: Nestor Phoenix MD;  [facility]    LAPAROTOMY EXPLORATORY N/A 01/31/2023    Procedure: Exploratory Laparotomy, lysis of adhesions, Perforated J-Junostomy Resection, Replace J-Junostomy site;  Surgeon: Elver Bauer MD;  [facility]    LAPAROTOMY, LYSIS ADHESIONS, COMBINED N/A 01/31/2023    Procedure: Dilatation of jejunostomy feeding tube, track with placement of jejunostomy tube with fluoroscopy;  Surgeon: Elver Bauer MD;  [facility]    PICC DOUBLE LUMEN PLACEMENT Left 11/13/2023    Basilic Vein 5F DL 43 cm    REMOVE AND REPLACE BREAST IMPLANT PROSTHESIS N/A 12/30/2022    Procedure: Exploratory Laparotomy, lysis of adhesions, small bowel resection. Placement of gastric jejunostomy for enteral feeding.;  Surgeon: Elver Bauer MD;  [facility]    REMOVE GASTROSTOMY TUBE ADULT N/A 01/28/2022    Procedure: REMOVAL, GASTROSTOMY TUBE, ADULT;  Surgeon: Mandeep Park MD;  [facility]    REPLACE GASTROJEJUNOSTOMY TUBE, PERCUTANEOUS N/A 09/07/2021    Procedure: GASTROJEJUNOSTOMY TUBE PLACEMENT, PERCUTANEOUS, WITH GASTROPEXY;  Surgeon: Mandeep Park MD;  [facility]    REPLACE GASTROJEJUNOSTOMY TUBE, PERCUTANEOUS N/A 09/22/2021    Procedure: REPLACEMENT, GASTROJEJUNOSTOMY TUBE, PERCUTANEOUS;  Surgeon: Zackery Montoya MD;  [facility]    REPLACE GASTROJEJUNOSTOMY TUBE, PERCUTANEOUS N/A 11/22/2022    Procedure: REPLACEMENT, GASTROJEJUNOSTOMY TUBE, PERCUTANEOUS;  Surgeon: Mandeep Park MD;  [facility]    REPLACE GASTROJEJUNOSTOMY TUBE, PERCUTANEOUS N/A 12/09/2022    Procedure: REPLACEMENT, GASTROJEJUNOSTOMY TUBE, PERCUTANEOUS with ENDOSCOPIC SUTURING.;  Surgeon: Mandeep Park MD;  [facility]    REPLACE GASTROJEJUNOSTOMY TUBE, PERCUTANEOUS N/A 08/01/2023    Procedure: Replace Gastrojejunostomy Tube, Percutaneous;  Surgeon: Mandeep Park  MD;  [facility]    REPLACE GASTROJEJUNOSTOMY TUBE, PERCUTANEOUS N/A 2023    Procedure: Replace Gastrojejunostomy Tube, Percutaneous;  Surgeon: Francisco Dodson MD;  [facility]    REPLACE GASTROJEJUNOSTOMY TUBE, PERCUTANEOUS N/A 2023    Procedure: Replace Gastrojejunostomy Tube, Percutaneous;  Surgeon: Mandeep Park MD;  [facility]    REPLACE GASTROSTOMY TUBE, PERCUTANEOUS N/A 10/31/2024    Procedure: REPLACEMENT, GASTROSTOMY TUBE, PERCUTANEOUS;  Surgeon: Mandeep Park MD;  [facility]    REPLACE JEJUNOSTOMY TUBE, PERCUTANEOUS N/A 09/10/2021    Procedure: UPPER ENDOSCOPY, REPLACEMENT OF PERCUTANEOUS GASTROJEJUNOSTOMY TUBE, T-TAG GASTROPEXY;  Surgeon: Zackery Montoya MD;  [facility]    REPLACE JEJUNOSTOMY TUBE, PERCUTANEOUS N/A 2021    Procedure: REPLACEMENT, JEJUNOSTOMY TUBE, PERCUTANEOUS;  Surgeon: Mandeep Park MD;  [facility]    REPLACE JEJUNOSTOMY TUBE, PERCUTANEOUS N/A 2023    Procedure: REPLACEMENT, JEJUNOSTOMY TUBE, PERCUTANEOUS;  Surgeon: Mandeep Park MD;  [facility]    REPLACE JEJUNOSTOMY TUBE, PERCUTANEOUS  2023    Procedure: Replace Jejunostomy Tube, Percutaneous;  Surgeon: Mandeep Park MD;  [facility]    REPLACE JEJUNOSTOMY TUBE, PERCUTANEOUS N/A 2024    Procedure: REPLACEMENT, JEJUNOSTOMY TUBE, PERCUTANEOUS;  Surgeon: Francisco Dodson MD;  [facility]    REPLACE JEJUNOSTOMY TUBE, PERCUTANEOUS N/A 10/31/2024    Procedure: REPLACEMENT, JEJUNOSTOMY TUBE, PERCUTANEOUS;  Surgeon: Mandeep Park MD;  [facility]    transphenoidal pituitary resection  1990    Z  DELIVERY ONLY  1996    Z  DELIVERY ONLY  1998    repeat c section with incidental cystotomy with repair    Z EXCIS PITUITARY,TRANSNASAL/SEPTAL  1980    pituitary tumor removed for diabetes insipidus    [facility] TOTAL ABDOM HYSTERECTOMY  1999    w/ bilateral  salpingoophorectomy               Allergies:   Reviewed and edited as appropriate     Allergies   Allergen Reactions    Apple Juice Anaphylaxis    Corticosteroids Other (See Comments)     All oral, IV and injectable steroids are contraindicated (unless in life threatening situations) in [employer] [employer] transplant recipients. They can cause irreversible loss of islet cell function. Please contact patient's transplant care coordinator ADI Gaffney RN at [phone]/pager [phone] and/or endocrinologist prior to administration.      Depakote [Divalproex Sodium] Other (See Comments)     Chest pain    Zithromax [Azithromycin Dihydrate] Anaphylaxis     Noted in 4/7/08 ER    Bromfenac      Other reaction(s): Headache    Codeine      Other reaction(s): Hallucinations    Darvocet [Propoxyphene N-Apap] Itching    Morphine Nausea and Vomiting and Rash    Nalbuphine Hcl Rash     RASH :nubaine    Bactrim [Sulfamethoxazole W-Trimethoprim] Other (See Comments) and Nausea and Vomiting     Severely low liver function.    Statins Other (See Comments)     Previous liver issues, risks vs benefits felt to not warrant statin.  Discussed Oct 2022 visit    Tape [Adhesive Tape] Blisters    Tramadol     Zofran [Ondansetron] Other (See Comments)     migraine    Flagyl [Metronidazole] Hives and Rash            Medications:     Current Facility-Administered Medications   Medication Dose Route Frequency Provider Last Rate Last Admin    acetaminophen (TYLENOL) tablet 650 mg  650 mg Oral Q4H PRN [place], [place], PA-C        Or    acetaminophen (TYLENOL) Suppository 650 mg  650 mg Rectal Q4H PRN [place], [place], PA-C        amitriptyline (ELAVIL) suspension 40 mg  40 mg Oral At Bedtime Ronald Omer MD        baclofen (LIORESAL) tablet 10-20 mg  10-20 mg Oral TID [place], [place], PA-C        [START ON 11/19/2024] cetirizine (zyrTEC) tablet 10 mg  10 mg Oral QAM [place] PA-C        cyclobenzaprine (FLEXERIL) tablet 5-10 mg   5-10 mg Oral Q8H PRN Lukasz Glasgow MD        glucose gel 15-30 g  15-30 g Oral Q15 Min PRN Lukasz Glasgow MD        Or    dextrose 50 % injection 25-50 mL  25-50 mL Intravenous Q15 Min PRN Lukasz Glasgow MD        Or    glucagon injection 1 mg  1 mg Subcutaneous Q15 Min PRN Lukasz Glasgow MD        diphenhydrAMINE (BENADRYL) elixir 25 mg  25 mg Oral Daily PRN [place] Dc PA-C        droNABinol (SYNDROS) oral soln 2.5 mg  2.5 mg Oral BID [place] Dc PA-C        DULoxetine (CYMBALTA) DR capsule 60 mg  60 mg Oral QPM [place] Dc PA-C        estradiol (VAGIFEM) vaginal tablet 10 mcg  10 mcg Vaginal Once per day on Monday Thursday Argentina Dc PA-C        famotidine (PEPCID) suspension 20 mg  20 mg Oral QPM [place] Dc PA-C        HYDROmorphone (PF) (DILAUDID) injection 0.5 mg  0.5 mg Intravenous Q4H PRN Lukasz Glasgow MD   0.5 mg at 11/18/24 1257    hydrOXYzine HCl (ATARAX) tablet 25 mg  25 mg Oral Q6H PRN Lukasz Glasgow MD        insulin aspart (NovoLOG) injection (RAPID ACTING)  1-4 Units Subcutaneous Q4H Lukasz Glasgow MD   1 Units at 11/18/24 0832    [START ON 11/19/2024] levothyroxine 20 mcg/mL (THYQUIDITY) oral solution 175 mcg  175 mcg Oral Daily [place] Dc PA-C        lipase-protease-amylase (CREON 24) 67168-95153-897006 units per EC capsule 3-4 capsule  3-4 capsule Oral TID w/meals [place] Dc PA-C        methocarbamol (ROBAXIN) tablet 750 mg  750 mg Oral 4x Daily PRN Lukasz Glasgow MD        montelukast (SINGULAIR) tablet 10 mg  10 mg Oral At Bedtime [place] Dc PA-C        naloxone (NARCAN) injection 0.2 mg  0.2 mg Intravenous Q2 Min PRN Ronald Omer MD        Or    naloxone (NARCAN) injection 0.4 mg  0.4 mg Intravenous Q2 Min PRN Ronald Omer MD        Or    naloxone (NARCAN) injection 0.2 mg  0.2 mg Intramuscular Q2 Min PRN Ronald Omer MD        Or    naloxone (NARCAN) injection 0.4 mg  0.4 mg Intramuscular Q2 Min  PRN Ronald Omer MD        oxyCODONE (ROXICODONE) tablet 5 mg  5 mg Oral Q6H PRN [place] Dc PA-C   5 mg at 11/18/24 1120    Or    oxyCODONE IR (ROXICODONE) tablet 10 mg  10 mg Oral Q6H PRN [place] Dc PA-C        pantoprazole (PROTONIX) IV push injection 40 mg  40 mg Intravenous Daily with breakfast Lukasz Glasgow MD        prochlorperazine (COMPAZINE) injection 10 mg  10 mg Intravenous Q6H PRN Lukasz Glasgow MD   10 mg at 11/18/24 1157    rimegepant (NURTEC) ODT tablet 75 mg  75 mg Sublingual Daily PRN Ronald Omer MD        seneverett-docusate (SENOKOT-S/PERICOLACE) 8.6-50 MG per tablet 1 tablet  1 tablet Oral BID PRN Lukasz Glasgow MD        Or    senna-docusate (SENOKOT-S/PERICOLACE) 8.6-50 MG per tablet 2 tablet  2 tablet Oral BID PRN Lukasz Glasgow MD        traZODone (DESYREL) tablet 50 mg  50 mg Oral At Bedtime [place] Dc PA-C        ZOLMitriptan (ZOMIG-ZMT) ODT tab 5 mg  5 mg Oral at onset of headache Lukasz Glasgow MD   5 mg at 11/18/24 1249             Physical Exam:   Temp: 97.6  F (36.4  C) Temp src: Oral BP: 113/59 Pulse: 98   Resp: 16 SpO2: 96 % O2 Device: None (Room air)    Wt:   Wt Readings from Last 2 Encounters:   11/17/24 63.5 kg (140 lb)   11/06/24 64.6 kg (142 lb 6.4 oz)        General: Well appearing, smiling female in NAD.  Answers appropriately.    HEENT: Head is AT/NC. Sclera anicteric. No conjunctival injection.  Oropharynx is clear, moist and w/o exudate or lesions.  Lungs: Clear to auscultation bilaterally.  No wheezes, rhonchi or crackles.    Heart: Regular rate and rhythm.  No murmurs, gallops or rubs.  Normal S1 and S2.  Abdomen: RUQ PEG clamped. External bumper at 8cm, balloon deflated (contained 8cc) and re-inflated with 3cc NS. Tube pulled back until gentle resistance felt, external bumper now at 4cm and a total of 5cc NS put in balloon. PEJ site covered in bandage. RLQ ileostomy with opaque ostomy bag not manipulated  Extremities: WWP,  no pedal edema.  Skin: No jaundice or rash  Neurologic: Grossly non-focal.  CN 2-12 grossly intact.            Data:   Labs and imaging below were independently reviewed and interpreted    LAB WORK:    BMP  Recent Labs   Lab 11/18/24  0810 11/18/24  0607 11/18/24  0520 11/17/24 2322 11/17/24 2249   NA  --  137  --   --  139   POTASSIUM  --  5.2  --   --  4.1   CHLORIDE  --  104  --   --  103   KASSI  --  8.5*  --   --  9.2   CO2  --  22  --   --  25   BUN  --  15.4  --   --  12.1   CR  --  0.64  --   --  0.78   * 106* 100* 120* 51*     CBC  Recent Labs   Lab 11/18/24 0607 11/17/24 2249   WBC 10.5 12.0*   RBC 3.20* 3.38*   HGB 9.1* 9.6*   HCT 29.4* 31.5*   MCV 92 93   MCH 28.4 28.4   MCHC 31.0* 30.5*   RDW 13.3 13.4   * 600*     INRNo lab results found in last 7 days.  LFTs  Recent Labs   Lab 11/17/24 2249   ALKPHOS 265*   AST 30   ALT 48   BILITOTAL 0.2   PROTTOTAL 7.1   ALBUMIN 3.8      PANC  Recent Labs   Lab 11/17/24 2249   LIPASE 6*       IMAGING:  ------------------------------------------------------------------  EXAM: CT ABDOMEN PELVIS W CONTRAST  LOCATION: [facility]  DATE: 11/18/2024     INDICATION: abd infection abscess  COMPARISON: 10/21/2024  TECHNIQUE: CT scan of the abdomen and pelvis was performed following injection of IV contrast. Multiplanar reformats were obtained. Dose reduction techniques were used.  CONTRAST: iopamidol (ISOVUE 370) solution 86 mL     FINDINGS:   LOWER CHEST: Dependent atelectasis in the posterior lung bases.     HEPATOBILIARY: Postcholecystectomy changes with stable pneumobilia.     PANCREAS: Pancreatitis changes with multiple clips in the pancreatic bed. The remaining pancreas is not well seen.     SPLEEN: Surgically absent. Liver extends into the left abdomen.     ADRENAL GLANDS: Normal.     KIDNEYS/BLADDER: Mild atrophy bilaterally. The bilateral kidneys enhance symmetrically without evidence for hydronephrosis  or pyelonephritis. No renal masses or calculi noted. The bilateral ureters and urinary bladder are unremarkable.     BOWEL: Percutaneous gastrostomy tube enters the distal stomach with the tip likely in the first portion of the duodenum. Percutaneous jejunostomy tube in a similar position to prior examination. Jejunostomy balloon no longer seen. Clinical correlation   for function of the tube recommended. Dilated loops of small bowel in the left upper and mid abdomen with decompressed loops of bowel in the remainder of the abdomen. No clear transition point seen. Findings appear similar to prior exam. Percutaneous   right lower quadrant ileostomy. Rectal stump  suture appears intact.     LYMPH NODES: No lymphadenopathy.     VASCULATURE: No abdominal aortic aneurysm.     PELVIC ORGANS: No pelvic masses. No pelvic free fluid. Uterus is surgically absent.     MUSCULOSKELETAL: No concerning osseous abnormalities. No inguinal or ventral hernias.                                                                      IMPRESSION:   1.  Percutaneous gastrostomy tube enters the distal stomach with the tip likely in the first portion of the duodenum. Percutaneous jejunostomy tube in a similar position to prior examination. Jejunostomy balloon no longer seen. Clinical correlation for   function of the tube recommended.  2.  Dilated loops of small bowel in the left upper and mid abdomen with decompressed loops of bowel in the remainder of the abdomen. No clear transition point seen. Findings appear similar to prior exam.  3.  Percutaneous right lower quadrant ileostomy. Rectal stump suture appears intact.    =======================================================================

## 2024-11-18 NOTE — PROGRESS NOTES
"Brief Medicine Update:  Patient was started on 2 cartons daily of Max-Wellness to see if she could increase oral intake enough to no longer need TF. Unfortunately she started vomiting earlier this afternoon. Her meds were put back on hold, an abdominal xray was ordered, and she was made NPO.   - Will hand off to evening team to consider abdominal CT if patient worsens  - Monitor closely    Temp: 98.9  F (37.2  C) Temp src: Oral BP: 121/77 Pulse: 87   Resp: 16 SpO2: 93 % Height: 172.7 cm (5' 8\") Weight: 63.5 kg (140 lb)    Argentina Dc PAOLEGARIO  Hospital Medicine [facility]   [facility] [place]  Securely message with [employer] (more info)  Text page via [employer] Paging/Directory    "

## 2024-11-18 NOTE — H&P
[facility]    History and Physical - Medicine Service, [facility] TEAM        Date of Admission:  11/17/2024    Assessment & Plan      Dinora Mcghee is a 59 year old female w/ PMH type 1 DM, chronic pancreatitis s/p TPAIT 12/2016, pituitary adenoma s/p resection, hypothyroidism, GERD, migraines, gastroparesis and slow transit constipation s/p ex lap, revision of J tube, small bowel resection w/ ileorectal anastomosis and diverting loop ileostomy 10/11/2024 admitted 11/18/2024 after her J tube fell out.     #Abdominal pain  #Displaced J tube  Hx chronic pancreatitis, gastroparesis, slow transit constipation w/ complicated surgical history including TPAIT 12/2016. Most recently s/p ex lap, revision of PEG-J tube, small bowel resection w/ ileorectal anastomosis and diverting loop ileostomy 10/11/2024. Presented to ED w/ abdominal pain, decreased ostomy output, and J tube balloon on top of skin -> subsequently fell completely out while in ED. Vitally stable, CMP largely unremarkable, elevated CRP, mild leukocytosis at recent baseline, Hb stable. CT prior to tube falling out w/ no obvious fluid collection or abscess, dilated loops of small bowel w/ decompressed loops in remainder of bowel similar to prior, stable ileostomy. Appears well on exam w/ some mild abdominal tenderness near prior tube site and what appears to be stool draining from site. Discussed w/ GI, no indication to place anything in tract now, will evaluate for replacement tomorrow. Suspect pain 2/2 malpositioned J tube, improved somewhat now that it's out. Low suspicion for infection given stable leukocytosis, reassuring CT, and absence of systemic symptoms thus will hold off on abx for now. No obvious obstruction on CT, decreased ostomy output possibly in s/o not getting TF 11/17. Patient currently running TPN, will continue and hold home meds until evaluated by GI in am.  - NPO  - Pain control: IV dilaudid  0.5mg Q4hrs prn  - Nausea control: IV compazine prn  - Hold home meds and tube feeds  - Pharmacy med rec  - Continue TPN -> Nutrition consult  - GI consult    #Type 1 DM s/p TPIAT 12/2016  PTA on glargine 3 units daily w/ sliding scale. Initially hypoglycemic to 51 in ED, patient utilized her own glucagon to correct w/ repeat 120.   - Hold PTA regimen  - LDSS  - Hypoglycemia protocol    #GERD  - Continue PTA IV PPI    #Hypothyroidism  #Hx pituitary adenoma s/p resection  - Hold PTA levothyroxine given above    #Migraines  - Hold PTA meds given above         Observation Goals: -diagnostic tests and consults completed and resulted, -vital signs normal or at patient baseline, -returns to baseline functional status, Nurse to notify provider when observation goals have been met and patient is ready for discharge.  Diet: NPO per Anesthesia Guidelines for Procedure/Surgery Except for: Meds    DVT Prophylaxis: Pneumatic Compression Devices  Piedra Catheter: Not present  Fluids: TPN  Lines: PRESENT             Cardiac Monitoring: None  Code Status: No CPR- Do NOT Intubate  discussed with patient    Clinically Significant Risk Factors Present on Admission                        # Anemia: based on hgb <11           # Financial/Environmental Concerns:           Disposition Plan      Expected Discharge Date: 11/19/2024                Patient to be formally staffed in am.       Lukasz Glasgow MD  Medicine Service, [facility]  Securely message with [employer] (more info)  Text page via [facility] Paging/Directory   See signed in provider for up to date coverage information  ______________________________________________________________________    Chief Complaint   J tube problem    History is obtained from the patient    History of Present Illness   Dinora Mcghee is a 59 year old female w/ PMH type 1 DM, chronic pancreatitis s/p TPAIT 12/2016, pituitary adenoma s/p resection,  hypothyroidism, GERD, migraines, gastroparesis and slow transit constipation s/p ex lap, revision of J tube, small bowel resection w/ ileorectal anastomosis and diverting loop ileostomy 10/11/2024 who presents w/ abdominal pain after her J tube fell out.    Patient reports experiencing abdominal pain near her J tube site since it was placed on 10/11/2024 and on 11/17, experienced an acute worsening of this pain. Upon examining the site, she noted that the balloon was completely out of her abdomen and this prompted her presentation to the ED. In addition, she notes minimal ostomy output since 0700 on 11/17 which is atpyical for her (she usually empties the bag 4-5 times daily). She has some baseline nausea that is well controlled with medications. She otherwise denies any fever, chills, recent illness, headache, dizziness, chest pain, shortness of breath, vomiting, blood in her ostomy output, blood in her urine or pain with urination, or leg swelling. She currently gets TPN every Tuesday, Thursday, Sunday and has some running currently. She has been tolerating PO intake recently and estimates this to be around 1000 calories a day. She normally runs tube feeds daily and uses her G tube for venting around 90% of the time. She has not run her tube feeds since 11/16. She takes most of her medications via her J tube. She denies any tobacco, alcohol, or recreational drug. She does use medical marijuana. She [place] of [facility] with her .     In the ED, vitally stable w/ labs notable for alk phos 265, crp 34.5, lactic acid unremarkable, lipase 6, WBC 12, Hb 9.6. CT a/p w/ percutaneous G tube w/ tip in first portion of duodenum, J tube in similar position to prior imaging but balloon no longer seen, dilated loops of small bowel w/ decompressed loops in the remainder of the abdomen w/o clear transition point similar to prior exam. Shortly after the CT, her J tube fell out completely. Discussed w/ GI who  plans to evaluate for J tube replacement in the am.     Past Medical History    Past Medical History:   Diagnosis Date    Allergic rhinitis, cause unspecified     Allergy to other foods     strawberries, apples, celeries, alice, watermelon    Arthritis     left knee    Choledocholithiasis     long after cholecystectomy    Chronic abdominal pain     Chronic constipation     Chronic nausea     Chronic pancreatitis (H)     Degeneration of lumbar or lumbosacral intervertebral disc     Esophageal reflux     w/ hiatal hernia    Gastroparesis     Hiatal hernia     History of pituitary adenoma     s/p resection    Hypothyroidism     Migraines     Mild hyperlipidemia     On tube feeding diet     presence of GJ tube    Pancreatic disease     PD stricture, 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 endometriosis growth    APPENDECTOMY       SECTION  1993    COLONOSCOPY  2014    COLONOSCOPY N/A 2023    Procedure: COLONOSCOPY, WITH POLYPECTOMY AND BIOPSY;  Surgeon: Percy Chamorro MD;  [facility]    ENDOSCOPIC INSERTION TUBE GASTROSTOMY N/A 2021    Procedure: Gastrojejonostomy placement with jejunopexy, PEG tube exchange;  Surgeon: Zackery Montoya MD;  [facility]    ENDOSCOPIC RETROGRADE CHOLANGIOPANCREATOGRAM N/A 2015    Procedure: ENDOSCOPIC RETROGRADE CHOLANGIOPANCREATOGRAM;  Surgeon: Mandeep Park MD;  [facility]    ENDOSCOPIC RETROGRADE CHOLANGIOPANCREATOGRAM N/A 2016    Procedure: COMBINED ENDOSCOPIC RETROGRADE CHOLANGIOPANCREATOGRAPHY, PLACE TUBE/STENT;  Surgeon: Mandeep Park MD;  [facility]    ENDOSCOPIC  RETROGRADE CHOLANGIOPANCREATOGRAM N/A 03/17/2016    Procedure: COMBINED ENDOSCOPIC RETROGRADE CHOLANGIOPANCREATOGRAPHY, REMOVE FOREIGN BODY OR STENT/TUBE;  Surgeon: Mandeep Park MD;  [facility]    ENDOSCOPIC RETROGRADE CHOLANGIOPANCREATOGRAM N/A 08/02/2016    Procedure: COMBINED ENDOSCOPIC RETROGRADE CHOLANGIOPANCREATOGRAPHY, PLACE TUBE/STENT;  Surgeon: Mandeep Park MD;  [facility]    ENDOSCOPIC RETROGRADE CHOLANGIOPANCREATOGRAM N/A 08/26/2016    Procedure: COMBINED ENDOSCOPIC RETROGRADE CHOLANGIOPANCREATOGRAPHY, REMOVE FOREIGN BODY OR STENT/TUBE;  Surgeon: Mandeep Park MD;  [facility]    ENDOSCOPIC ULTRASOUND UPPER GASTROINTESTINAL TRACT (GI) N/A 10/03/2016    Procedure: ENDOSCOPIC ULTRASOUND, ESOPHAGOSCOPY / UPPER GASTROINTESTINAL TRACT (GI);  Surgeon: Guru Jose Rodas MD;  [facility]    ENT SURGERY  1989    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    ENTEROSCOPY SMALL BOWEL N/A 02/03/2022    Procedure: ENTEROSCOPY with possible fistula closure;  Surgeon: Francisco Dodson MD;  Location:  GI    ENTEROSCOPY SMALL BOWEL N/A 9/11/2024    Procedure: Upper endoscopy, gastrostomy tube placement and jejunostomy tube exchange;  Surgeon: Zackery Montoya MD;  Location:  OR    ESOPHAGOSCOPY, GASTROSCOPY, DUODENOSCOPY (EGD), COMBINED N/A 06/24/2015    Procedure: COMBINED ESOPHAGOSCOPY, GASTROSCOPY, DUODENOSCOPY (EGD), REMOVE FOREIGN BODY;  Surgeon: Mandeep Park MD;  Location:  GI    ESOPHAGOSCOPY, GASTROSCOPY, DUODENOSCOPY (EGD), COMBINED N/A 10/25/2015    Procedure: COMBINED ESOPHAGOSCOPY, GASTROSCOPY, DUODENOSCOPY (EGD);  Surgeon: Sammy Amaro MD;  Location:  GI    ESOPHAGOSCOPY, GASTROSCOPY, DUODENOSCOPY (EGD), COMBINED N/A 10/25/2015    Procedure: COMBINED ESOPHAGOSCOPY, GASTROSCOPY, DUODENOSCOPY (EGD), BIOPSY SINGLE OR MULTIPLE;  Surgeon: Sammy Amaro MD;  Location:  GI    ESOPHAGOSCOPY, GASTROSCOPY, DUODENOSCOPY  (EGD), COMBINED N/A 01/31/2023    Procedure: ESOPHAGOGASTRODUODENOSCOPY (EGD) with peg replacement ;  Surgeon: Mandeep Park MD;  [facility]    ESOPHAGOSCOPY, GASTROSCOPY, DUODENOSCOPY (EGD), COMBINED N/A 7/24/2024    Procedure: Esophagoscopy, gastroscopy, duodenoscopy (EGD), combined;  Surgeon: Zackery Montoya MD;  [facility]    ESOPHAGOSCOPY, GASTROSCOPY, DUODENOSCOPY (EGD), COMBINED N/A 10/31/2024    Procedure: Esophagoscopy, gastroscopy, duodenoscopy (EGD), combined;  Surgeon: Mandeep Park MD;  [facility]    ESOPHAGOSCOPY, GASTROSCOPY, DUODENOSCOPY (EGD), DILATATION, COMBINED      EXCISE LESION TRUNK N/A 04/17/2017    Procedure: EXCISE LESION TRUNK;  Removal of Abdominal Foreign Body;  Surgeon: Nestor Phoenix MD;  [facility]    HC ESOPH/GAS REFLUX TEST W NASAL IMPED >1 HR N/A 11/19/2015    Procedure: ESOPHAGEAL IMPEDENCE FUNCTION TEST WITH 24 HOUR PH GREATER THAN 1 HOUR;  Surgeon: Thiago Apple MD;  [facility]    HC UGI ENDOSCOPY DIAG W BIOPSY  09/17/2008    HC UGI ENDOSCOPY DIAG W BIOPSY  09/27/2012    HC UGI ENDOSCOPY W ESOPHAGEAL DILATION BALLOON <30MM  09/17/2008    HC UGI ENDOSCOPY W EUS N/A 05/05/2015    Procedure: COMBINED ENDOSCOPIC ULTRASOUND, ESOPHAGOSCOPY, GASTROSCOPY, DUODENOSCOPY (EGD);  Surgeon: Wm Dueñas MD;  [facility]    HC WRIST ARTHROSCOP,RELEASE XVERS LIG Bilateral 12/17/2008    INJECT TRANSVERSUS ABDOMINIS PLANE (TAP) BLOCK BILATERAL Left 09/22/2016    Procedure: INJECT TRANSVERSUS ABDOMINIS PLANE (TAP) BLOCK BILATERAL;  Surgeon: Dickson Corrigan MD;  [facility]    INJECT TRIGGER POINT Bilateral 09/08/2022    Procedure: Abdominal trigger point injection with ultrasound;  Surgeon: Monika Mahajan MD;  [facility]    INJECT TRIGGER POINT SINGLE / MULTIPLE 3 OR MORE MUSCLES Right 11/15/2022    Procedure: Trigger point injections right abdomen with ultrasound guidance;  Surgeon: Monika Mahajan MD;  Location:  [facility]    IR CHEST PORT PLACEMENT < 5 YRS OF AGE  06/10/2022    IR CVC TUNNEL PLACEMENT > 5 YRS OF AGE  4/15/2024    IR PORT REMOVAL RIGHT  11/09/2023    LAPAROSCOPIC ASSISTED COLECTOMY N/A 10/11/2024    Procedure: Exploratory laparotomy, extensive lysis of adhesions, revision of Jtube and small bowel resection, TOTAL ABDOMINAL COLECTOMY WITH ILEORECTAL ANASTAMOSIS, DIVERTING LOOP ILEOSTOMY, flexible sigmoidoscopy;  Surgeon: Kaylene Branch MD;  [facility]    laparoscopic pineda  01/01/1995    LAPAROSCOPIC HERNIORRHAPHY INCISIONAL N/A 08/23/2018    Procedure: LAPAROSCOPIC HERNIORRHAPHY INCISIONAL;  Laparoscopic Incisional Hernia Repair with Symbotex Mesh Implant;  Surgeon: Nestor Phoenix MD;  [facility]    LAPAROSCOPIC PANCREATECTOMY, TRANSPLANT AUTO ISLET CELL N/A 12/28/2016    Procedure: LAPAROSCOPIC PANCREATECTOMY, TRANSPLANT AUTO ISLET CELL;  Surgeon: Nestor Phoenix MD;  [facility]    LAPAROTOMY EXPLORATORY N/A 01/31/2023    Procedure: Exploratory Laparotomy, lysis of adhesions, Perforated J-Junostomy Resection, Replace J-Junostomy site;  Surgeon: Elver Bauer MD;  [facility]    LAPAROTOMY, LYSIS ADHESIONS, COMBINED N/A 01/31/2023    Procedure: Dilatation of jejunostomy feeding tube, track with placement of jejunostomy tube with fluoroscopy;  Surgeon: Elver Bauer MD;  [facility]    PICC DOUBLE LUMEN PLACEMENT Left 11/13/2023    Basilic Vein 5F DL 43 cm    REMOVE AND REPLACE BREAST IMPLANT PROSTHESIS N/A 12/30/2022    Procedure: Exploratory Laparotomy, lysis of adhesions, small bowel resection. Placement of gastric jejunostomy for enteral feeding.;  Surgeon: Elver Bauer MD;  [facility]    REMOVE GASTROSTOMY TUBE ADULT N/A 01/28/2022    Procedure: REMOVAL, GASTROSTOMY TUBE, ADULT;  Surgeon: Mandeep Park MD;  [facility]    REPLACE GASTROJEJUNOSTOMY TUBE, PERCUTANEOUS N/A 09/07/2021    Procedure: GASTROJEJUNOSTOMY TUBE  PLACEMENT, PERCUTANEOUS, WITH GASTROPEXY;  Surgeon: Mandeep Park MD;  [facility]    REPLACE GASTROJEJUNOSTOMY TUBE, PERCUTANEOUS N/A 09/22/2021    Procedure: REPLACEMENT, GASTROJEJUNOSTOMY TUBE, PERCUTANEOUS;  Surgeon: Zackery Montoya MD;  [facility]    REPLACE GASTROJEJUNOSTOMY TUBE, PERCUTANEOUS N/A 11/22/2022    Procedure: REPLACEMENT, GASTROJEJUNOSTOMY TUBE, PERCUTANEOUS;  Surgeon: Mandeep Park MD;  [facility]    REPLACE GASTROJEJUNOSTOMY TUBE, PERCUTANEOUS N/A 12/09/2022    Procedure: REPLACEMENT, GASTROJEJUNOSTOMY TUBE, PERCUTANEOUS with ENDOSCOPIC SUTURING.;  Surgeon: Mandeep Park MD;  [facility]    REPLACE GASTROJEJUNOSTOMY TUBE, PERCUTANEOUS N/A 08/01/2023    Procedure: Replace Gastrojejunostomy Tube, Percutaneous;  Surgeon: Mandeep Park MD;  [facility]    REPLACE GASTROJEJUNOSTOMY TUBE, PERCUTANEOUS N/A 11/11/2023    Procedure: Replace Gastrojejunostomy Tube, Percutaneous;  Surgeon: Francisco Dodson MD;  [facility]    REPLACE GASTROJEJUNOSTOMY TUBE, PERCUTANEOUS N/A 12/8/2023    Procedure: Replace Gastrojejunostomy Tube, Percutaneous;  Surgeon: Mandeep Park MD;  [facility]    REPLACE GASTROSTOMY TUBE, PERCUTANEOUS N/A 10/31/2024    Procedure: REPLACEMENT, GASTROSTOMY TUBE, PERCUTANEOUS;  Surgeon: Mandeep Park MD;  [facility]    REPLACE JEJUNOSTOMY TUBE, PERCUTANEOUS N/A 09/10/2021    Procedure: UPPER ENDOSCOPY, REPLACEMENT OF PERCUTANEOUS GASTROJEJUNOSTOMY TUBE, T-TAG GASTROPEXY;  Surgeon: Zackery Montoya MD;  [facility]    REPLACE JEJUNOSTOMY TUBE, PERCUTANEOUS N/A 12/29/2021    Procedure: REPLACEMENT, JEJUNOSTOMY TUBE, PERCUTANEOUS;  Surgeon: Mandeep Park MD;  [facility]    REPLACE JEJUNOSTOMY TUBE, PERCUTANEOUS N/A 05/04/2023    Procedure: REPLACEMENT, JEJUNOSTOMY TUBE, PERCUTANEOUS;  Surgeon: Mandeep Park MD;  [facility]    REPLACE JEJUNOSTOMY TUBE,  PERCUTANEOUS  2023    Procedure: Replace Jejunostomy Tube, Percutaneous;  Surgeon: Mandeep Park MD;  [facility]    REPLACE JEJUNOSTOMY TUBE, PERCUTANEOUS N/A 2024    Procedure: REPLACEMENT, JEJUNOSTOMY TUBE, PERCUTANEOUS;  Surgeon: Francisco Dodson MD;  [facility]    REPLACE JEJUNOSTOMY TUBE, PERCUTANEOUS N/A 10/31/2024    Procedure: REPLACEMENT, JEJUNOSTOMY TUBE, PERCUTANEOUS;  Surgeon: Mandeep Park MD;  [facility]    transphenoidal pituitary resection  1990    Z  DELIVERY ONLY  1996    Z  DELIVERY ONLY  1998    repeat c section with incidental cystotomy with repair    Z EXCIS PITUITARY,TRANSNASAL/SEPTAL  1980    pituitary tumor removed for diabetes insipidus    [facility] TOTAL ABDOM HYSTERECTOMY  1999    w/ bilateral salpingoophorectomy        Prior to Admission Medications   Prior to Admission Medications   Prescriptions Last Dose Informant Patient Reported? Taking?   COMPOUNDED NON-CONTROLLED SUBSTANCE (CMPD RX) - PHARMACY TO MIX COMPOUNDED MEDICATION  Self No No   Sig: Administer 40 mg amitriptyline suspension (2 mg/mL) via G-J tube at bedtime.   COMPOUNDED NON-CONTROLLED SUBSTANCE (CMPD RX) - PHARMACY TO MIX COMPOUNDED MEDICATION  Self No No   Sig: Administer 60 mg amitriptyline 2 mg/ml via G-J tube at bedtime   Patient taking differently: Administer 40 mg amitriptyline 2 mg/ml via G-J tube at bedtime   Continuous Blood Gluc Sensor (FREESTYLE LISA 3 SENSOR) MISC  Self No No   Sig: CHANGE EVERY 14 DAYS   Continuous Glucose Sensor (FREESTYLE LISA 3 SENSOR) MISC   No No   Sig: Change every 14 days.   DULoxetine HCl 60 MG CSDR  Self No No   Si capsule (60 mg) by Per J Tube route daily Sprinkle caps for feeding tube   Patient taking differently: Place 60 mg into J tube every evening. Sprinkle caps for feeding tube   Digestive Enzyme Cartridge (RELIZORB) MARCO  Self No No   Si Cartridges daily   Patient taking  "differently: 3 Cartridges daily.   Digestive Enzyme Cartridge (RELIZORB) Device   No No   Sig: Place 3 each (3 Devices) into GJ tube daily. Administer as  3 device daily   Emergency Supply Kit, Central,   No No   Sig: Patient use for emergency only. Contents: 3 sodium chloride 0.9% flushes, 1 dressing kit, 1 microclave ext set 14\", 4 nitrile gloves (med), 6 alcohol prep pads, 1 bacitracin, 1 syringe (10 cc 20 G 1\"). Call [phone] to reorder.   Amy [employer] 1.5 Peptide Plain 325 mL   No No   Sig: Place 1,463 mLs into GJ tube daily. Infuse via  pump   rate varies based on tolerance  Water flush: 60ml 6x/day   Multiple Vitamin (INFUVITE ADULT) injection   No No   Sig: Add to infusion 10 mLs daily. Select 2 multivitamin vials, one of each color top. Draw up 5 mL from each vial and add to 1 TPN bag daily immediately prior to infusing.   Nutritional Supplements (BOOST HIGH PROTEIN) LIQD  Self No No   Sig: After above baseline labs are drawn, give: 6 mL/kg to maximum of 360 mL; the beverage is to be consumed within 5 minutes.   Prosource TF PO LIQD (PROSOURCE TF)   No No   Sig: Place 45 mLs into GJ tube daily. Infuse via syringe  1 packet daily  Water flush: 60ml 6x/day   STATIN NOT PRESCRIBED (INTENTIONAL)  Self Yes No   Sig: Previous liver issues, risks vs benefits felt to not warrant statin.    Discussed Oct 2022 visit   SYNDROS 5 MG/ML oral soln   No No   Sig: TAKE 0.5 ML BY MOUTH 2 TIMES DAILY   Sharps Container (BD SHARPS ) MISC  Self No No   Si Container as needed   ZOLMitriptan (ZOMIG-ZMT) 5 MG ODT  Self No No   Sig: Take 1 tablet (5 mg) by mouth at onset of headache for migraine Dissolve tablet in the mouth. May repeat in 2 hours. Max 2 tablets/24 hours.   acetaminophen (TYLENOL) 325 MG/10.15ML solution  Self No No   Si.3 mLs (650 mg) by Per J Tube route every 6 hours as needed for mild pain or fever   baclofen (LIORESAL) 10 MG tablet  Self No No   Si-2 tablets (10-20 mg) by Per G Tube " route 3 times daily   cetirizine (ZYRTEC) 10 MG tablet  Self No No   Si tablet (10 mg) by Per G Tube route 2 times daily   Patient taking differently: Place 10 mg into G tube every morning.   cyclobenzaprine (FLEXERIL) 10 MG tablet   No No   Sig: Take 0.5-1 tablets (5-10 mg) by mouth every 8 hours as needed for muscle spasms.   diphenhydrAMINE (BENADRYL) 12.5 MG/5ML solution  Self Yes No   Sig: Take 25 mg by mouth 4 times daily as needed for other (nausea)   estradiol (VAGIFEM) 10 MCG TABS vaginal tablet  Self No No   Sig: INSERT 1 TABLET(10 MCG) VAGINALLY 2 TIMES A WEEK   famotidine (PEPCID) 40 MG/5ML suspension   No No   Sig: Place 2.5 mLs (20 mg) into J tube every evening.   glucagon 1 MG kit  Self No No   Sig: Give 0.1 to 0.15mg( 10-15 units on Insulin sryinge) subcutaneous  every 15 minutes PRN for hypoglycemia. Remix new kit q24hr. Needs up to 3 kit/week.   glucose 40 % GEL gel  Self No No   Sig: Take 15-30 g by mouth every 15 minutes as needed for low blood sugar   hydrOXYzine HCl (ATARAX) 25 MG tablet   No No   Sig: Take 1 tablet (25 mg) by mouth every 6 hours as needed for anxiety or other (adjuvant pain).   ibuprofen (ADVIL/MOTRIN) 400 MG tablet  Self Yes No   Sig: take 30 mls  by oral route  every 4 - 6 hours as needed   insulin aspart (NOVOLOG FLEXPEN) 100 UNIT/ML pen   No No   Sig: Take 2 units with meals OR use insulin carbohydrate ratio 1 unit per 15 grams of carbohydrates three time daily with meals/snacks plus correction scale 1 unit per 50 >140 three times daily before meals and at bedtime (ok to correct >140 at bedtime since TPN is running overnight). Total daily Novolog: ~ 15 units/day   insulin glargine (LANTUS PEN) 100 UNIT/ML pen   No No   Sig: Inject 3 Units subcutaneously every 24 hours. Take daily at 8PM.   insulin regular 100 UNIT/ML injection   No No   Sig: Add to infusion 0.18 mLs (18 Units) daily. Add 1 syringe to TPN daily immediately prior to infusing. NOT for direct injection.   "For use in TPN only.  Syringe contains 5 units of overfill that will remain in the needle.   insulin syringe-needle U-100 (30G X 1/2\" 0.3 ML) 30G X 1/2\" 0.3 ML miscellaneous  Self No No   Sig: Use 3 syringes daily or as directed.   lactated ringers in 1,000 mL via CADD pump   No No   Sig: Infuse into the vein daily as needed (dehydration). Remove air via CADD pump. Infuse 1 bag(s) (1000 mL). Each bag to infuse over 2 hours. Reservoir Volume 1000 mL. Continuous rate: 500 mL/hr.   lactated ringers infusion  Self Yes No   Sig: Inject 1,000 mLs into the vein daily as needed   levothyroxine (SYNTHROID) 25 mcg/mL SUSP   No No   Sig: Place 7 mLs (175 mcg) into J tube daily.   lidocaine (LIDODERM) 5 % patch  Self Yes No   Sig: Place onto the skin as needed. To prevent lidocaine toxicity, patient should be patch free for 12 hrs daily.   lipase-protease-amylase (CREON) [id number] units CPEP per EC capsule  Self No No   Sig: Take 3-4 capsules by mouth 3 times daily (with meals) With oral meals   meclizine (ANTIVERT) 25 MG tablet   Yes No   Sig: Take 25 mg by mouth 3 times daily as needed.   methocarbamol (ROBAXIN) 750 MG tablet  Self No No   Sig: Take 1 tablet (750 mg) by mouth 4 times daily as needed for muscle spasms   miconazole (MICATIN) 2 % external powder   No No   Sig: Apply topically 2 times daily as needed for other (candidiasis).   montelukast (SINGULAIR) 10 MG tablet  Self Yes No   Sig: Take 10 mg by mouth at bedtime.   oxyCODONE (ROXICODONE) 5 MG tablet   No No   Sig: Take 1-2 tablets (5-10 mg) by mouth every 6 hours as needed for pain.   oxyCODONE (ROXICODONE) 5 MG/5ML solution   No No   Sig: Take 5-10 mLs (5-10 mg) by mouth or G tube every 6 hours as needed for severe pain.   pantoprazole (PROTONIX) 40 mg IV push injection  Self No No   Sig: Inject 40 mg into the vein daily (with breakfast)   Patient taking differently: Inject 40 mg into the vein every morning.   pantoprazole (PROTONIX) injection   No No " "  Sig: Inject 10 mLs (40 mg) over 5 minutes into the vein via push daily. Reconstitute vial. Draw up pantoprazole 4 mg/mL in a syringe. Discard remainder of vial.   parenteral nutrition - TNA - see order for formula   No No   Sig: Infuse 1,690 mLs over 12 hours into the vein (central line) three times a week. TPN additives to be added prior to administration:   Infuvite-Adult 10 mL daily.   Regular Insulin 18 units daily.  Taper up for 1 Hours. Taper down for 1 Hours. Plateau rate:153.7 mL/hr.  KVO: 5 mL/hr.   prochlorperazine (COMPAZINE) 10 MG tablet  Self No No   Sig: Take 1 tablet (10 mg) by mouth every 6 hours as needed for nausea or vomiting   prochlorperazine (COMPAZINE) 10 MG tablet   No No   Sig: Take 1 tablet (10 mg) by mouth every 6 hours as needed for nausea or vomiting.   promethazine (PHENERGAN) injection   No No   Sig: Add to infusion 1 mL (25 mg) every 8 hours as needed for nausea or vomiting. Draw up in a syringe and add to homepump immediately prior to infusing.  Discard remainder of vial.   rimegepant (NURTEC) 75 MG ODT tablet  Self No No   Sig: Place 1 tablet (75 mg) under the tongue daily as needed for migraine Maximum of 1 tablet every 24 hours.   scopolamine (TRANSDERM) 1 MG/3DAYS 72 hr patch  Self No No   Sig: Place 1 patch onto the skin every 72 hours - Transdermal   silver nitrate (ARZOL SILVER NIT APPLICATORS) 75-25 % miscellaneous  Self No No   Sig: Apply topically as needed for wound care Apply Silver Nitrate Sticks every 2-3 days until granulation tissue resolved.  \"Roll\" tip of Silver Nitrate Q tip directly onto the granulation tissue-bulging tissue area.  Have Agency or Home Care Nurse administer this, if you prefer.  Take care not to touch any healthy tissue or skin.  The tissue will turn white and eventually black & scab off.  May repeat if needed in the future for recurrence.   sodium chloride 0.9% elastomeric pump   No No   Sig: Infuse 61 mLs into the vein every 8 hours as needed " (for use with promethazine). Add 1 mL (25 mg) of promethazine 25 mg/mL to homepump, then add 5 mL NaCl 0.9% to homepump to flush port, immediately prior to infusing.   sodium chloride, PF, 0.9% PF flush   No No   Sig: Inject 10 mLs into the vein as needed for line flush. Flush IV before and after medication administration as directed and/or at least every 24 hours.   sodium chloride, PF, 0.9% PF flush   No No   Sig: Use 10 mLs for reconstitution daily. Add 1 syringe (10 mL) to each vial of pantoprazole 40 mg. Swirl until solution is clear.   sodium chloride, PF, 0.9% PF flush   No No   Sig: Add to infusion 5 mLs every 8 hours. Add to homepump after adding promethazine to flush port.   traZODone (DESYREL) 50 MG tablet   No No   Sig: Take 1 tablet (50 mg) by mouth at bedtime.   zinc oxide - white petrolatum (CRITIC-AID THICK MOIST BARRIER) 20-51% PSTE topical paste  Self No No   Sig: Apply 71 g topically every hour as needed for rash (Under J tube bumper when needed for skin protection)      Facility-Administered Medications Last Administration Doses Remaining   Botulinum Toxin Type A (BOTOX) 200 units injection 155 Units 5/23/2024  5:03 PM    Botulinum Toxin Type A (BOTOX) 200 units injection 155 Units 11/7/2024  2:42 PM            Review of Systems    The 10 point Review of Systems is negative other than noted in the HPI or here.      Physical Exam   Vital Signs: Temp: 98  F (36.7  C) Temp src: Oral BP: 113/59 Pulse: 102   Resp: 16 SpO2: 95 % O2 Device: None (Room air)    Weight: 140 lbs 0 oz    Gen: resting comfortably in bed, NAD  HEENT: normocephalic, atruamatic, anicteric sclerae  CV: RRR, no murmur  Resp: no respiratory distress, lung sounds clear to auscultation throughout  Abd: soft, non distended, tender to palpation around former tube site w/ some surrounding erythema, tube site appears to be leaking stool, no guarding, no peritoneal signs  MSK: no peripheral edema bilaterally  Skin: no rashes on exposed  skin  Neuro: no focal deficit    Medical Decision Making       Please see A&P for additional details of medical decision making.      Data     I have personally reviewed the following data over the past 24 hrs:    12.0 (H)  \   9.6 (L)   / 600 (H)     139 103 12.1 /  120 (H)   4.1 25 0.78 \     ALT: 48 AST: 30 AP: 265 (H) TBILI: 0.2   ALB: 3.8 TOT PROTEIN: 7.1 LIPASE: 6 (L)     Procal: N/A CRP: 34.50 (H) Lactic Acid: 0.8         Imaging results reviewed over the past 24 hrs:   Recent Results (from the past 24 hours)   CT Abdomen Pelvis w Contrast    Narrative    EXAM: CT ABDOMEN PELVIS W CONTRAST  LOCATION: [facility]  DATE: 11/18/2024    INDICATION: abd infection abscess  COMPARISON: 10/21/2024  TECHNIQUE: CT scan of the abdomen and pelvis was performed following injection of IV contrast. Multiplanar reformats were obtained. Dose reduction techniques were used.  CONTRAST: iopamidol (ISOVUE 370) solution 86 mL    FINDINGS:   LOWER CHEST: Dependent atelectasis in the posterior lung bases.    HEPATOBILIARY: Postcholecystectomy changes with stable pneumobilia.    PANCREAS: Pancreatitis changes with multiple clips in the pancreatic bed. The remaining pancreas is not well seen.    SPLEEN: Surgically absent. Liver extends into the left abdomen.    ADRENAL GLANDS: Normal.    KIDNEYS/BLADDER: Mild atrophy bilaterally. The bilateral kidneys enhance symmetrically without evidence for hydronephrosis or pyelonephritis. No renal masses or calculi noted. The bilateral ureters and urinary bladder are unremarkable.    BOWEL: Percutaneous gastrostomy tube enters the distal stomach with the tip likely in the first portion of the duodenum. Percutaneous jejunostomy tube in a similar position to prior examination. Jejunostomy balloon no longer seen. Clinical correlation   for function of the tube recommended. Dilated loops of small bowel in the left upper and mid abdomen with decompressed  loops of bowel in the remainder of the abdomen. No clear transition point seen. Findings appear similar to prior exam. Percutaneous   right lower quadrant ileostomy. Rectal stump  suture appears intact.    LYMPH NODES: No lymphadenopathy.    VASCULATURE: No abdominal aortic aneurysm.    PELVIC ORGANS: No pelvic masses. No pelvic free fluid. Uterus is surgically absent.    MUSCULOSKELETAL: No concerning osseous abnormalities. No inguinal or ventral hernias.      Impression    IMPRESSION:   1.  Percutaneous gastrostomy tube enters the distal stomach with the tip likely in the first portion of the duodenum. Percutaneous jejunostomy tube in a similar position to prior examination. Jejunostomy balloon no longer seen. Clinical correlation for   function of the tube recommended.  2.  Dilated loops of small bowel in the left upper and mid abdomen with decompressed loops of bowel in the remainder of the abdomen. No clear transition point seen. Findings appear similar to prior exam.  3.  Percutaneous right lower quadrant ileostomy. Rectal stump suture appears intact.

## 2024-11-18 NOTE — PROGRESS NOTES
[facility] Nutrition brief note    Spoke with inpatient RD today.    She reports pt wants J-tube to stay out as it is causing her a lot of pain. I deferred this decision to the GI team and/or primary medicine team, and it sounds like they are choosing to keep it out for how (and are having Dr. Park weigh in).      Pt willing to take [employer] ONS, will order; if able to take 2x/day will make up Kcal/protein difference w/ missed TFs (with recent intake 2 cartons/day).     Plan to continue TPN 3 days per week and continue to wean TPN as po intake improves.  Will continue to monitor for need to replace FT vs continue po diet and wean TPN.    Molly Siemens, RD, CNSC, LD  [facility] Dietitian

## 2024-11-18 NOTE — ED TRIAGE NOTES
Patient's J tube balloon popped about two hours prior to arrival. She also endorses abdominal pain and stool output from the J tube site. She has had no ostomy output since 0700.     Also endorses nausea, no vomiting.      Triage Assessment (Adult)       Row Name 11/17/24 4427          Triage Assessment    Airway WDL WDL        Respiratory WDL    Respiratory WDL WDL        Skin Circulation/Temperature WDL    Skin Circulation/Temperature WDL WDL        Cardiac WDL    Cardiac WDL WDL        Peripheral/Neurovascular WDL    Peripheral Neurovascular WDL WDL        Cognitive/Neuro/Behavioral WDL    Cognitive/Neuro/Behavioral WDL WDL

## 2024-11-18 NOTE — PLAN OF CARE
Goal Outcome Evaluation:     -diagnostic tests and consults completed and resulted: Not met  -vital signs normal or at patient baseline:Met  -returns to baseline functional status: In progress  Nurse to notify provider when observation goals have been met and patient is ready for discharge.

## 2024-11-18 NOTE — PROGRESS NOTES
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 Patient has both a G and J tube but uses the J tube for nutrition. Patient also takes some nutrition orally.    GI plans to evaluate for J-tube replacement tomorrow.  - Please obtain CT A/P with IV contrast to assess for abscess around the tract.  - Patient will likely need IV fluids given her main source of nutrition is through the J-tube.  - CBC, CMP  - NPO at midnight.    Spoke to Dr. Lowe in the ED.    Recommendations discussed with Dr. Mandeep Park.

## 2024-11-18 NOTE — PLAN OF CARE
"Goal Outcome Evaluation:      Plan of Care Reviewed With: patient  Blood pressure 118/73, pulse 98, temperature 97.6  F (36.4  C), temperature source Oral, resp. rate 16, height 1.727 m (5' 8\"), weight 63.5 kg (140 lb), SpO2 98%, not currently breastfeeding.     Overall Patient Progress: improvingOverall Patient Progress: improving  -diagnostic tests and consults completed and resulted: Not met  -vital signs normal or at patient baseline:Met  -returns to baseline functional status: In progress  Nurse to notify provider when observation goals have been met and patient is ready for discharge.    Managing pain with IV dilaudid and Oxy.   Patient was taken off Npo status, procedure cancelled. Migraine meds provided. G-tube dressing done.             "

## 2024-11-18 NOTE — PROGRESS NOTES
[facility]    Medicine Progress Note - Hospitalist Service    Date of Admission:  11/17/2024    Assessment & Plan   Dinora Mcghee is a 59 year old female w/ PMH type 1 DM, chronic pancreatitis s/p TPAIT 12/2016, pituitary adenoma s/p resection, hypothyroidism, GERD, migraines, gastroparesis and slow transit constipation s/p ex lap, revision of J tube, small bowel resection w/ ileorectal anastomosis and diverting loop ileostomy 10/11/2024 admitted on 11/18/2024 after her J tube dislodged.     # Abdominal pain  # Displaced J tube  Hx chronic pancreatitis, gastroparesis, slow transit constipation w/ complicated surgical history including TPAIT 12/2016. Most recently s/p ex lap, revision of PEG-J tube, small bowel resection w/ ileorectal anastomosis and diverting loop ileostomy on 10/11/2024. Presented to ED 11/17 w/ abdominal pain, decreased ostomy output, and J tube balloon on top of skin -> subsequently fell completely out around 0100 on 11/18. Vitally stable, CMP largely unremarkable, elevated CRP, mild leukocytosis at recent baseline, Hb stable. CT prior to tube falling out w/ no obvious fluid collection or abscess, dilated loops of small bowel w/ decompressed loops in remainder of bowel similar to prior, stable ileostomy. Appears well on exam w/ some abdominal distention and mild abdominal tenderness near prior tube site. GI evaluating for J tube replacement. Suspect pain 2/2 malpositioned J tube, improved somewhat now that it's out. Low suspicion for infection given stable leukocytosis, reassuring CT, and absence of systemic symptoms thus held off on abx. No obvious obstruction on CT, decreased ostomy output possibly in s/o not getting TF 11/17. Pt requesting to hold off on J tube replacement if possible as it is causing a lot of pain.   - RD consulted  - Per I dietician, pt making great strides with her PO intake and has been able to start tapering down on her  TPN as of ~3 weeks or so (TPN now 3x/week).  - TFs have been ~2 cartons/day overnight but her full goal for TFs is 4.5 cartons total per infusion (so she is about half way there).  - If able to take 2 protein shakes per day it would make up the Kcal difference from her TFs (with her current intake of 2 cartons/evening)   - Pain control: PO oxycodone 5-10 mg Q6H PRN (patient's home regimen) and IV Dilaudid 0.5mg Q4hrs PRN  - Nausea control: IV compazine PRN  - Pharmacy med rec  - GI consulted, recs appreciated  - Will hold off on J tube replacement for now pending oral intake progress    # Type 1 DM s/p TPIAT 12/2016  PTA on glargine 3 units daily w/ sliding scale. Initially hypoglycemic to 51 in ED, patient utilized her own glucagon to correct w/ repeat 120.   - LDSS  - Consider restarting home glargine pending oral intake  - Hypoglycemia protocol    # GERD: Continue PTA IV PPI  # Hypothyroidism, Hx pituitary adenoma s/p resection: Hold PTA levothyroxine administered via J tube given above  # Migraines: Hold PTA meds given above     Observation Goals: -diagnostic tests and consults completed and resulted, -vital signs normal or at patient baseline, -returns to baseline functional status, Nurse to notify provider when observation goals have been met and patient is ready for discharge.  Diet: Snacks/Supplements Adult: Ensure Enlive; Between Meals  Regular Diet Adult    DVT Prophylaxis: Pneumatic Compression Devices  Piedra Catheter: Not present  Lines: PRESENT      CVC Double Lumen Right Internal jugular Tunneled-Site Assessment: WDL      Cardiac Monitoring: None  Code Status: No CPR- Do NOT Intubate      Clinically Significant Risk Factors Present on Admission           # Hypocalcemia: Lowest Ca = 8.5 mg/dL in last 2 days, will monitor and replace as appropriate                # Anemia: based on hgb <11           # Financial/Environmental Concerns:           Social Drivers of Health    Tobacco Use: Medium Risk  (11/6/2024)    Patient History     Smoking Tobacco Use: Former     Smokeless Tobacco Use: Unknown   Stress: Stress Concern Present (3/19/2024)    [employer] [place] [employer] - Occupational Stress Questionnaire     Feeling of Stress : To some extent   Social Connections: Unknown (3/19/2024)    Social Connection and Isolation Panel [NHANES]     Frequency of Social Gatherings with Friends and Family: Once a week          Disposition Plan     Medically Ready for Discharge: Anticipated Tomorrow       The patient's care was discussed with the Attending Physician, Dr. Jasso, Patient, GI Consultant(s), and EMANUEL Dc PA-C  Hospitalist Service  [facility]  Securely message with [employer] (more info)  Text page via [facility] Paging/Directory   ______________________________________________________________________    Interval History   Patient reports increasing distention this morning, occurring for the past couple of days. Initially reported no ostomy output since 7 am on 11/18, but small amount just appeared now in ostomy during visit. Pt reports her G tube hurting quite a bit, and notes she only uses this about 5% of the time for venting. Pt denies chest pain, shortness of breath, and vomiting. Endorses nausea. Requesting home oxycodone and other normal home medications for pain.     Physical Exam   Vital Signs: Temp: 97.6  F (36.4  C) Temp src: Oral BP: 118/73 Pulse: 98   Resp: 16 SpO2: 98 % O2 Device: None (Room air)    Weight: 140 lbs 0 oz    GEN: NAD. Appears stated age.  HEENT: Normocephalic, without obvious abnormality, atraumatic. Conjunctivae clear without exudates or hemorrhage. EOMs intact. Anicteric sclera. MMMs.  CV: RRR. No murmurs, rubs, or gallops. S1 and S2 are of normal intensity. No peripheral edema. Intact bilateral pedal pulses.  PULM: Respirations even and unlabored. No signs of respiratory distress. Lung sounds clear to auscultation  anteriorly.   GI: Soft, non distended, mildly tender to palpation around former tube site, mild distention, no guarding, no peritoneal signs   PSYCH: Appropriate mood and affect. No visual or auditory hallucinations.   HEME/LYMPH: No jaundice or pallor noted.    Medical Decision Making       50 MINUTES SPENT BY ME on the date of service doing chart review, history, exam, documentation & further activities per the note.      Data     I have personally reviewed the following data over the past 24 hrs:    10.5  \   9.1 (L)   / 589 (H)     137 104 15.4 /  132 (H)   5.2 22 0.64 \     ALT: 48 AST: 30 AP: 265 (H) TBILI: 0.2   ALB: 3.8 TOT PROTEIN: 7.1 LIPASE: 6 (L)     Procal: N/A CRP: 34.50 (H) Lactic Acid: 0.8         Imaging results reviewed over the past 24 hrs:   Recent Results (from the past 24 hours)   CT Abdomen Pelvis w Contrast    Narrative    EXAM: CT ABDOMEN PELVIS W CONTRAST  LOCATION: [facility]  DATE: 11/18/2024    INDICATION: abd infection abscess  COMPARISON: 10/21/2024  TECHNIQUE: CT scan of the abdomen and pelvis was performed following injection of IV contrast. Multiplanar reformats were obtained. Dose reduction techniques were used.  CONTRAST: iopamidol (ISOVUE 370) solution 86 mL    FINDINGS:   LOWER CHEST: Dependent atelectasis in the posterior lung bases.    HEPATOBILIARY: Postcholecystectomy changes with stable pneumobilia.    PANCREAS: Pancreatitis changes with multiple clips in the pancreatic bed. The remaining pancreas is not well seen.    SPLEEN: Surgically absent. Liver extends into the left abdomen.    ADRENAL GLANDS: Normal.    KIDNEYS/BLADDER: Mild atrophy bilaterally. The bilateral kidneys enhance symmetrically without evidence for hydronephrosis or pyelonephritis. No renal masses or calculi noted. The bilateral ureters and urinary bladder are unremarkable.    BOWEL: Percutaneous gastrostomy tube enters the distal stomach with the tip likely in  the first portion of the duodenum. Percutaneous jejunostomy tube in a similar position to prior examination. Jejunostomy balloon no longer seen. Clinical correlation   for function of the tube recommended. Dilated loops of small bowel in the left upper and mid abdomen with decompressed loops of bowel in the remainder of the abdomen. No clear transition point seen. Findings appear similar to prior exam. Percutaneous   right lower quadrant ileostomy. Rectal stump  suture appears intact.    LYMPH NODES: No lymphadenopathy.    VASCULATURE: No abdominal aortic aneurysm.    PELVIC ORGANS: No pelvic masses. No pelvic free fluid. Uterus is surgically absent.    MUSCULOSKELETAL: No concerning osseous abnormalities. No inguinal or ventral hernias.      Impression    IMPRESSION:   1.  Percutaneous gastrostomy tube enters the distal stomach with the tip likely in the first portion of the duodenum. Percutaneous jejunostomy tube in a similar position to prior examination. Jejunostomy balloon no longer seen. Clinical correlation for   function of the tube recommended.  2.  Dilated loops of small bowel in the left upper and mid abdomen with decompressed loops of bowel in the remainder of the abdomen. No clear transition point seen. Findings appear similar to prior exam.  3.  Percutaneous right lower quadrant ileostomy. Rectal stump suture appears intact.

## 2024-11-18 NOTE — PROGRESS NOTES
CLINICAL NUTRITION SERVICES - ASSESSMENT NOTE     Nutrition Prescription    RECOMMENDATIONS FOR MDs/PROVIDERS TO ORDER:  >> If patient becomes inpatient status, order calorie counts to better assess PO intake adequacy (defer currently with patient on observation status)    >> Defer to primary team MD and/or GI team for decision regarding J-tube replacement. Most recent TF intake of 2 cartons Amy [employer]udMet[employer] Peptide 1.5 kevin per day was providing ~975 Kcals and 48 gm protein per day.     >> Will place order for oral nutrition supplements with goal for pt to consume at least 2 cartons per day to make up Kcal/protein difference with TFs in addition to regular foods/PO intake however patient would need to purchase ONS in home setting at time of discharge (they are typically not provided by home infusion service).     Malnutrition Status:    Patient does not meet two of the established criteria necessary for diagnosing malnutrition but is at risk for malnutrition    Recommendations already ordered by Registered Dietitian (RD):  Placed order for Ensure Enlive chocolate flavor to be sent BID once diet order allows (350 Kcals, 20 gm protein per carton)    Continue home TPN as ordered (only infuses 3 days per week, Sun Tue, Thurs)  Dosing weight:  63.5 kg (admit wt)  Access: central, cyclic over 12  hours     Initial parameters (per day)  Volume:  1690 mL or per PharmD   Dextrose: 200 g  AA: 100 g  Lipids: 250 mL 20% (Sun, Tue, Thurs per home regimen)     Additives: Per PharmD     ON DAYS RECEIVES TPN: Goal PN provides 200 g dextrose, 100 g AA, and 250 mL 20% lipids 3 days per week for total provision of 1580 Kcals, 1.6 g/kg protein, peak GIR 5.2 mg/kg/minute, and 32% fat kcals on average daily.   >>Contributes 226 Kcals, 14 gm protein when averaging over 7-days    Future/Additional Recommendations:  Monitor nutrition-related findings and follow pt per protocol  Monitor POC regarding J-tube replacement, PO intake adequacy/ONS  tolerance    Updated [facility] dietitian on current nutrition POC with question if J-tube to be replaced or not. [facility] RD agrees w/ plan of keeping TPN as ordered PTA (rather than increasing if J-tube remains out)    If future POC to restart TFs, EN regimen PTA of [employer] Peptide 1.5 kevin (or equivalent), 2 cartons/day (650 mL formula) + 1 pkt Prosource daily (substitute Prosource TF20 while in hospital) run at 50 mL/hr until TF infusion complete. This regimen provides ~975 Kcals (+ 80 with Prosource packet), 48 gm pro (+20 gm with Prosource packet)     REASON FOR ASSESSMENT  Dinora Mcghee is a/an 59 year old female assessed by the dietitian for Pharmacy/Nutrition to Start and Manage PN (continuation of home TPN)    CLINICAL HISTORY  Hx of T1DM, chronic pancreatitis s/p TPAIT (12/2016), pituitary adenoma s/p resection, hypothyroidism, GERD, migraines, gastroparesis, and slow transit constipation s/p ex lap, revision of J tube, small bowel resection w/ ileorectal anastomosis and diverting loop ileostomy 10/11/2024 admitted 11/18/2024 after her J tube fell out.     Per H&P: Discussed w/ GI, no indication to place anything in tract now, will evaluate for replacement tomorrow. Suspect pain 2/2 malpositioned J tube, improved somewhat now that it's out. Low suspicion for infection given stable leukocytosis, reassuring CT, and absence of systemic symptoms thus will hold off on abx for now. No obvious obstruction on CT, decreased ostomy output possibly in s/o not getting TF 11/17. Patient currently running TPN, will continue and hold home meds until evaluated by GI in am.   - Additional: Runs TPN Tues, Thurs, Sunday. She has been tolerating PO intake recently and estimates this to be around 1000 calories a day. She normally runs tube feeds daily and uses her G tube for venting around 90% of the time. She has not run her tube feeds since 11/16.     NUTRITION HISTORY  Allergies: Strawberries, apples, celery, alice, watermelon,  "apple juice - pt prefers to self-manage her allergies rather than having them all listed into EPIC per report (only apple juice currently showing).     Per med list upon admit and [facility] dietitian report of home TF/TPN 11/18/24:   \"She was advancing TFs and we are weaning her TPN.  TPN now 3 days per week. TFs were at 50l/hr last week (2 cartons/day). Pt runs in evening. Runs TFs 4-5 PM start time per report. Oral diet is improving! Able to tolerate soft, easy to digest foods - cereal, eggs, pasta, tuna, chicken, soup, potatoes.  Per our note last week: suggested she try moving TF up earlier in the day to see if that can slow ostomy outputs overnight. Will adjust lytes in TPN for high phos and on high end K. Otherwise, pt is doing well with PN 3 days per week.\"    EN formula per PTA Med list info:   Prescription for TF at goal: ByteShield 1.5 Peptide Plain 325 mL . Place 1,463 mLs into GJ tube daily. Infuse via  pump  rate varies based on tolerance. Water flush: 60ml 6x/day + 1 pkt Prosource TF daily   >>See comment above with I RD reporting most recent TF intake 2 cartons (650 mL formula)/day run at 50 mL/hr (only ~half of prescribed volume)    TPN PTA Med list info:   parenteral nutrition - TNA  Infuse 1,690 mLs over 12 hours into the vein (central line) three times a week (Tue, Thurs, Sun).   TPN additives to be added prior to administration:  Infuvite-Adult 10 mL daily.  Regular Insulin 18 units daily.  Taper up for 1 Hours. Taper down for 1 Hours.   Dosing weight 63 kg   Macros: 100 gm AA, 50 gm lipid, 200 gm dextrose    Per patient interview today 11/18:   - Patient reports PO intake has been improving better than anticipated. Estimates Kcal intake ~1000 Kcals/day. Has been taking a Premier Protein oral nutrition supplement with 1/2 to 1 carton per day in addition to soft foods as noted above. Patient reports being able to reduce the amount of TPN that she is receiving to 3x/week (reports this change was made " ~3 weeks ago) and has been working on increasing her TFs in collaboration with [facility] dietitian. Most recent TF intake 2 cartons KF peptide 1.5 kevin run in evenings with total goal for TFs of 4.5 cartons/day to meet full Kcal/nutrition goal (without considering PO intake).   - Patient reports having a lot of pain at her J-tube site. Pt reports a desire for her J-tube to stay out and wonders if this is possible. Writer deferring this to patient's primary team and/or GI team, however it does seem reasonable given her increase to PO intake. Pt is open to trying oral nutrition supplements to make up the Kcal/protein difference of her TFs. Discussed with pt that either way protein shakes would be a good idea to try, especially given tolerance to Premier Protein shakes reported.     CURRENT NUTRITION ORDERS  Diet: NPO  Intake/Tolerance: N/A     LABS: Reviewed  Electrolytes  Potassium (mmol/L)   Date Value   11/18/2024 5.2   11/17/2024 4.1   10/28/2024 4.1   08/01/2022 4.1   06/10/2022 4.1   02/17/2022 4.0   12/17/2020 3.5   01/31/2020 4.0   09/13/2019 4.1     Phosphorus (mg/dL)   Date Value   10/28/2024 4.4   10/27/2024 4.2   10/26/2024 3.8   10/25/2024 3.9   10/24/2024 4.2   01/23/2017 3.6   01/09/2017 4.6 (H)   10/21/2016 3.4   09/08/2016 3.3   06/10/2015 4.3    Blood Glucose  Glucose (mg/dL)   Date Value   11/18/2024 106 (H)   11/17/2024 51 (L)   10/28/2024 98   08/01/2022 113 (H)   06/10/2022 128 (H)   02/17/2022 137 (H)   02/17/2022 174 (H)   02/17/2022 132 (H)   02/17/2022 132 (H)   12/17/2020 120 (H)   01/31/2020 91   01/31/2020 121 (H)   01/31/2020 112 (H)   09/13/2019 93     GLUCOSE BY METER POCT (mg/dL)   Date Value   11/18/2024 100 (H)   11/17/2024 120 (H)   10/31/2024 143 (H)   10/31/2024 137 (H)   10/28/2024 97     Hemoglobin A1C (%)   Date Value   07/23/2024 5.4   03/20/2024 5.6   02/17/2022 5.4   11/27/2021 5.2   09/18/2021 5.5   08/05/2021 5.4   05/12/2021 5.7   04/01/2021 5.9 (A)   12/17/2020 5.5   07/30/2020  "5.8 (H)    Inflammatory Markers  CRP Inflammation (mg/L)   Date Value   12/29/2016 90.0 (H)   06/06/2015 71.0 (H)   10/23/2012 7.2     WBC (10e9/L)   Date Value   12/17/2020 5.0   09/14/2020 6.2   01/31/2020 4.2     WBC Count (10e3/uL)   Date Value   11/18/2024 10.5   11/17/2024 12.0 (H)   10/28/2024 12.2 (H)     Albumin (g/dL)   Date Value   11/17/2024 3.8   10/28/2024 3.3 (L)   10/24/2024 3.4 (L)   02/17/2022 4.2   12/03/2021 3.0 (L)   11/28/2021 2.7 (L)   12/17/2020 3.9   07/30/2020 3.9   01/31/2020 4.1      Magnesium (mg/dL)   Date Value   10/28/2024 2.0   10/27/2024 2.1   10/26/2024 2.1   01/23/2017 2.0   01/22/2017 2.4 (H)   01/09/2017 2.8 (H)     Sodium (mmol/L)   Date Value   11/18/2024 137   11/17/2024 139   10/28/2024 137   12/17/2020 142   01/31/2020 141   09/13/2019 138    Renal  Urea Nitrogen (mg/dL)   Date Value   11/18/2024 15.4   11/17/2024 12.1   10/28/2024 22.4 (H)   08/01/2022 14   06/10/2022 12   02/17/2022 15   12/17/2020 9   01/31/2020 10   09/13/2019 14     Creatinine (mg/dL)   Date Value   11/18/2024 0.64   11/17/2024 0.78   10/28/2024 0.53   12/17/2020 0.78   01/31/2020 0.68   09/13/2019 0.76     Additional  Triglycerides (mg/dL)   Date Value   10/24/2024 68   10/17/2024 81   03/20/2024 101   01/31/2020 49   04/04/2017 55   12/19/2016 67     Ketones Urine (mg/dL)   Date Value   07/23/2024 Negative   07/31/2018 Negative         GI:  Last BM: Unable to assess vs PTA (no BM documentation yet for present admit)   GI concerns: J-tube dislodged, needs replacement       Pertinent Medications  PTA med list with Creon 24, 3-4 capsules per meal TID and Relizorb cartridges with TF    SKIN  WOCN not currently following pt; monitor for PI concerns     ANTHROPOMETRICS  Height: 172.7 cm (5' 8\")  Most Recent Weight: 63.5 kg (140 lb) 11/17/24  IBW: 63.6 kg  100%IBW   BMI: Normal BMI    Weight History:   Stable vs slight gain since ~August 2024  Wt Readings from Last 15 Encounters:   11/17/24 63.5 kg (140 lb) "   11/06/24 64.6 kg (142 lb 6.4 oz)   10/31/24 62.7 kg (138 lb 3.7 oz)   10/28/24 63.2 kg (139 lb 4.8 oz)   10/08/24 66.5 kg (146 lb 9.6 oz)   09/25/24 63 kg (139 lb)   09/11/24 63.5 kg (139 lb 15.9 oz)   08/26/24 61.2 kg (135 lb)   08/12/24 61.2 kg (135 lb)   07/24/24 58 kg (127 lb 13.9 oz)   07/22/24 56.7 kg (125 lb)   06/05/24 57.2 kg (126 lb)   05/30/24 56.2 kg (124 lb)   04/16/24 57.5 kg (126 lb 12.2 oz)   03/20/24 57 kg (125 lb 11.2 oz)       Dosing Weight: 63.5 kg (admit wt, IBW 63.6 kg)    ASSESSED NUTRITION NEEDS  Estimated Energy Needs: 8982-7338 kcals/day (25 - 30 kcals/kg)  Justification: Maintenance  Estimated Protein Needs: 75-95 grams protein/day (1.2 - 1.5 grams of pro/kg)  Justification: Repletion/lean body mass preservation   Estimated Fluid Needs: 1953-9077+ mL/day (30 - 35 mL/kg)   Justification: Maintenance, pending ostomy output/replacement needs     PHYSICAL FINDINGS  See malnutrition section below.    MALNUTRITION  % Intake: Decreased intake does not meet criteria  % Weight Loss: None noted  Subcutaneous Fat Loss: None observed  Muscle Loss: None observed  Fluid Accumulation/Edema: None noted  Malnutrition Diagnosis: Patient does not meet two of the established criteria necessary for diagnosing malnutrition but is at risk for malnutrition    NUTRITION DIAGNOSIS  Inadequate protein-energy intake related to complex GI hx as evidenced by reliance on PO + TF + TPN to meet nutrition goal (now w/ improved PO intake, recent reduction of PN, possible trial off of TFs if J-tube to remain out)      INTERVENTIONS  Implementation   Collaboration with other providers - [facility] RD, Primary team GIGI, GI team GIGI  Parenteral Nutrition/IV Fluids - Home regimen/order sent to PharmD  Supplements - order added once diet order allows      Goals  Patient to consume % of nutritionally adequate meal trays TID, or the equivalent with supplements/snacks vs replacement of J-tube for resumption of supplemental EN      Monitoring/Evaluation  Progress toward goals will be monitored and evaluated per protocol.  Robel Marshall, MS, RDN, LD, CNSC  Available by Vocera or phone   Vocera: M-F (7:00-3:30)  6B Clinical Dietitian  or 1A Obs Clinical Dietitian  Weekend/Holiday (7:00-3:30) - Weekend Clinical Dietitian   6B RD desk: [phone]       **Clinical Dietitians are no longer available by pager.

## 2024-11-18 NOTE — PLAN OF CARE
"Goal Outcome Evaluation:      Plan of Care Reviewed With: patient  Blood pressure 118/73, pulse 98, temperature 97.6  F (36.4  C), temperature source Oral, resp. rate 16, height 1.727 m (5' 8\"), weight 63.5 kg (140 lb), SpO2 98%, not currently breastfeeding.     Overall Patient Progress: improvingOverall Patient Progress: improving  -diagnostic tests and consults completed and resulted: Not met  -vital signs normal or at patient baseline:Met  -returns to baseline functional status: In progress  Nurse to notify provider when observation goals have been met and patient is ready for discharge.      Managing pain with IV dilaudid and Oxy.              "

## 2024-11-18 NOTE — ED NOTES
Pt g-tube becoming completely dislodged by pt when pt was removing stool covered dressing for dressing change. Dr. Lowe made aware.

## 2024-11-18 NOTE — ED PROVIDER NOTES
[place] EMERGENCY DEPARTMENT ([facility])    11/17/24       ED PROVIDER NOTE       History     Chief Complaint   Patient presents with    Gtube Problem     HPI  Dinora Mcghee is a 59 year old female WITH PMH type 1 diabetes, chronic pancreatitis, s/p prior TPAIT in 2016, hypothyroidism, h/o pituitary adenoma s/p prior resection, slow transit constipation, nausea, emesis, G-tube for venting (vents about 75% of the time, J-tube for tube feeds (runs at about 90mL/ hour over 13 hours), and TPN every Tues, Thurs, Sundays, also home IVF prn, chronic pain on buprenorphine with [facility], now s/p Ex Lap, BAUDILIO, revision of J-tube, small bowel resection, TAC with ileorectal anastomosis and diverting loop ileostomy, flexible sigmoidoscopy on 10/11/2024 who presents to the ED with a Gtube problem.     Per Chart Review,  Patient had successful replacement of G-tube (16F) and J-tube (14F, cut short) on 10/31/24.     Patient presents to the ED today due to her J tube balloon falling out about two hours prior to arrival. Patient reports the incision site has been painful, irritated, and bleeding all day. Patient states it is a sharp pain at the surface. She endorses drainage and what looks like stool output from the J tube site. She has had no ostomy output since 0700. Patient has not tube fed today and states she has been eating orally for the past 3 weeks. Patient tried to flush her J tube but had severe pain. Patient reports she gets a liter of fluids every other day and was supposed to get them today, but  was not able to. Patient notes a similar episode in the past where the balloon became deflated and fell out. Patient denies any fevers. Denies passing gas.     Past Medical History  Past Medical History:   Diagnosis Date    Allergic rhinitis, cause unspecified     Allergy to other foods     strawberries, apples, celeries, alice, watermelon    Arthritis     left knee    Choledocholithiasis     long  after cholecystectomy    Chronic abdominal pain     Chronic constipation     Chronic nausea     Chronic pancreatitis (H)     Degeneration of lumbar or lumbosacral intervertebral disc     Esophageal reflux     w/ hiatal hernia    Gastroparesis     Hiatal hernia     History of pituitary adenoma     s/p resection    Hypothyroidism     Migraines     Mild hyperlipidemia     On tube feeding diet     presence of GJ tube    Pancreatic disease     PD stricture, 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 endometriosis growth    APPENDECTOMY       SECTION  1993    COLONOSCOPY  2014    COLONOSCOPY N/A 2023    Procedure: COLONOSCOPY, WITH POLYPECTOMY AND BIOPSY;  Surgeon: Percy Chamorro MD;  [facility]    ENDOSCOPIC INSERTION TUBE GASTROSTOMY N/A 2021    Procedure: Gastrojejonostomy placement with jejunopexy, PEG tube exchange;  Surgeon: Zackery Montoya MD;  [facility]    ENDOSCOPIC RETROGRADE CHOLANGIOPANCREATOGRAM N/A 2015    Procedure: ENDOSCOPIC RETROGRADE CHOLANGIOPANCREATOGRAM;  Surgeon: Mandeep Park MD;  [facility]    ENDOSCOPIC RETROGRADE CHOLANGIOPANCREATOGRAM N/A 2016    Procedure: COMBINED ENDOSCOPIC RETROGRADE CHOLANGIOPANCREATOGRAPHY, PLACE TUBE/STENT;  Surgeon: Mandeep Park MD;  [facility]    ENDOSCOPIC RETROGRADE CHOLANGIOPANCREATOGRAM N/A 2016    Procedure: COMBINED ENDOSCOPIC RETROGRADE CHOLANGIOPANCREATOGRAPHY, REMOVE FOREIGN BODY OR STENT/TUBE;  Surgeon: Mandeep Park MD;  [facility]    ENDOSCOPIC RETROGRADE CHOLANGIOPANCREATOGRAM N/A 2016    Procedure: COMBINED ENDOSCOPIC RETROGRADE  CHOLANGIOPANCREATOGRAPHY, PLACE TUBE/STENT;  Surgeon: Mandeep Park MD;  [facility]    ENDOSCOPIC RETROGRADE CHOLANGIOPANCREATOGRAM N/A 08/26/2016    Procedure: COMBINED ENDOSCOPIC RETROGRADE CHOLANGIOPANCREATOGRAPHY, REMOVE FOREIGN BODY OR STENT/TUBE;  Surgeon: Mandeep Park MD;  [facility]    ENDOSCOPIC ULTRASOUND UPPER GASTROINTESTINAL TRACT (GI) N/A 10/03/2016    Procedure: ENDOSCOPIC ULTRASOUND, ESOPHAGOSCOPY / UPPER GASTROINTESTINAL TRACT (GI);  Surgeon: Guru Jose Rodas MD;  [facility]    ENT SURGERY  1989    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    ENTEROSCOPY SMALL BOWEL N/A 02/03/2022    Procedure: ENTEROSCOPY with possible fistula closure;  Surgeon: Francisco Dodson MD;  Location:  GI    ENTEROSCOPY SMALL BOWEL N/A 9/11/2024    Procedure: Upper endoscopy, gastrostomy tube placement and jejunostomy tube exchange;  Surgeon: Zackery Montoya MD;  [facility]    ESOPHAGOSCOPY, GASTROSCOPY, DUODENOSCOPY (EGD), COMBINED N/A 06/24/2015    Procedure: COMBINED ESOPHAGOSCOPY, GASTROSCOPY, DUODENOSCOPY (EGD), REMOVE FOREIGN BODY;  Surgeon: Mandeep Park MD;  [facility]    ESOPHAGOSCOPY, GASTROSCOPY, DUODENOSCOPY (EGD), COMBINED N/A 10/25/2015    Procedure: COMBINED ESOPHAGOSCOPY, GASTROSCOPY, DUODENOSCOPY (EGD);  Surgeon: Sammy Amaro MD;  [facility]    ESOPHAGOSCOPY, GASTROSCOPY, DUODENOSCOPY (EGD), COMBINED N/A 10/25/2015    Procedure: COMBINED ESOPHAGOSCOPY, GASTROSCOPY, DUODENOSCOPY (EGD), BIOPSY SINGLE OR MULTIPLE;  Surgeon: Sammy Amaro MD;  [facility]    ESOPHAGOSCOPY, GASTROSCOPY, DUODENOSCOPY (EGD), COMBINED N/A 01/31/2023    Procedure: ESOPHAGOGASTRODUODENOSCOPY (EGD) with peg replacement ;  Surgeon: Mandeep Park MD;  [facility]    ESOPHAGOSCOPY, GASTROSCOPY, DUODENOSCOPY (EGD), COMBINED N/A 7/24/2024    Procedure: Esophagoscopy, gastroscopy, duodenoscopy (EGD), combined;  Surgeon:  Zackery Montoya MD;  [facility]    ESOPHAGOSCOPY, GASTROSCOPY, DUODENOSCOPY (EGD), COMBINED N/A 10/31/2024    Procedure: Esophagoscopy, gastroscopy, duodenoscopy (EGD), combined;  Surgeon: Mandeep Park MD;  [facility]    ESOPHAGOSCOPY, GASTROSCOPY, DUODENOSCOPY (EGD), DILATATION, COMBINED      EXCISE LESION TRUNK N/A 04/17/2017    Procedure: EXCISE LESION TRUNK;  Removal of Abdominal Foreign Body;  Surgeon: Nestor Phoenix MD;  [facility]    HC ESOPH/GAS REFLUX TEST W NASAL IMPED >1 HR N/A 11/19/2015    Procedure: ESOPHAGEAL IMPEDENCE FUNCTION TEST WITH 24 HOUR PH GREATER THAN 1 HOUR;  Surgeon: Thiago Apple MD;  [facility]    HC UGI ENDOSCOPY DIAG W BIOPSY  09/17/2008    HC UGI ENDOSCOPY DIAG W BIOPSY  09/27/2012    HC UGI ENDOSCOPY W ESOPHAGEAL DILATION BALLOON <30MM  09/17/2008    HC UGI ENDOSCOPY W EUS N/A 05/05/2015    Procedure: COMBINED ENDOSCOPIC ULTRASOUND, ESOPHAGOSCOPY, GASTROSCOPY, DUODENOSCOPY (EGD);  Surgeon: Wm Dueñas MD;  [facility]    HC WRIST ARTHROSCOP,RELEASE XVERS LIG Bilateral 12/17/2008    INJECT TRANSVERSUS ABDOMINIS PLANE (TAP) BLOCK BILATERAL Left 09/22/2016    Procedure: INJECT TRANSVERSUS ABDOMINIS PLANE (TAP) BLOCK BILATERAL;  Surgeon: Dickson Corrigan MD;  [facility]    INJECT TRIGGER POINT Bilateral 09/08/2022    Procedure: Abdominal trigger point injection with ultrasound;  Surgeon: Monika Mahajan MD;  [facility]    INJECT TRIGGER POINT SINGLE / MULTIPLE 3 OR MORE MUSCLES Right 11/15/2022    Procedure: Trigger point injections right abdomen with ultrasound guidance;  Surgeon: Monika Mahajan MD;  [facility]    IR CHEST PORT PLACEMENT < 5 YRS OF AGE  06/10/2022    IR CVC TUNNEL PLACEMENT > 5 YRS OF AGE  4/15/2024    IR PORT REMOVAL RIGHT  11/09/2023    LAPAROSCOPIC ASSISTED COLECTOMY N/A 10/11/2024    Procedure: Exploratory laparotomy, extensive lysis of adhesions, revision of Jtube and small bowel  resection, TOTAL ABDOMINAL COLECTOMY WITH ILEORECTAL ANASTAMOSIS, DIVERTING LOOP ILEOSTOMY, flexible sigmoidoscopy;  Surgeon: Kaylene Branch MD;  [facility]    laparoscopic pineda  01/01/1995    LAPAROSCOPIC HERNIORRHAPHY INCISIONAL N/A 08/23/2018    Procedure: LAPAROSCOPIC HERNIORRHAPHY INCISIONAL;  Laparoscopic Incisional Hernia Repair with Symbotex Mesh Implant;  Surgeon: Nestor Phoenix MD;  [facility]    LAPAROSCOPIC PANCREATECTOMY, TRANSPLANT AUTO ISLET CELL N/A 12/28/2016    Procedure: LAPAROSCOPIC PANCREATECTOMY, TRANSPLANT AUTO ISLET CELL;  Surgeon: Nestor Phoenix MD;  [facility]    LAPAROTOMY EXPLORATORY N/A 01/31/2023    Procedure: Exploratory Laparotomy, lysis of adhesions, Perforated J-Junostomy Resection, Replace J-Junostomy site;  Surgeon: Elver Bauer MD;  [facility]    LAPAROTOMY, LYSIS ADHESIONS, COMBINED N/A 01/31/2023    Procedure: Dilatation of jejunostomy feeding tube, track with placement of jejunostomy tube with fluoroscopy;  Surgeon: Elver Bauer MD;  [facility]    PICC DOUBLE LUMEN PLACEMENT Left 11/13/2023    Basilic Vein 5F DL 43 cm    REMOVE AND REPLACE BREAST IMPLANT PROSTHESIS N/A 12/30/2022    Procedure: Exploratory Laparotomy, lysis of adhesions, small bowel resection. Placement of gastric jejunostomy for enteral feeding.;  Surgeon: Elver Bauer MD;  [facility]    REMOVE GASTROSTOMY TUBE ADULT N/A 01/28/2022    Procedure: REMOVAL, GASTROSTOMY TUBE, ADULT;  Surgeon: Mandeep Park MD;  [facility]    REPLACE GASTROJEJUNOSTOMY TUBE, PERCUTANEOUS N/A 09/07/2021    Procedure: GASTROJEJUNOSTOMY TUBE PLACEMENT, PERCUTANEOUS, WITH GASTROPEXY;  Surgeon: Mandeep Park MD;  [facility]    REPLACE GASTROJEJUNOSTOMY TUBE, PERCUTANEOUS N/A 09/22/2021    Procedure: REPLACEMENT, GASTROJEJUNOSTOMY TUBE, PERCUTANEOUS;  Surgeon: Zackery Montoya MD;  [facility]    REPLACE GASTROJEJUNOSTOMY  TUBE, PERCUTANEOUS N/A 11/22/2022    Procedure: REPLACEMENT, GASTROJEJUNOSTOMY TUBE, PERCUTANEOUS;  Surgeon: Mandeep Park MD;  [facility]    REPLACE GASTROJEJUNOSTOMY TUBE, PERCUTANEOUS N/A 12/09/2022    Procedure: REPLACEMENT, GASTROJEJUNOSTOMY TUBE, PERCUTANEOUS with ENDOSCOPIC SUTURING.;  Surgeon: Mandeep Park MD;  [facility]    REPLACE GASTROJEJUNOSTOMY TUBE, PERCUTANEOUS N/A 08/01/2023    Procedure: Replace Gastrojejunostomy Tube, Percutaneous;  Surgeon: Mandeep Park MD;  [facility]    REPLACE GASTROJEJUNOSTOMY TUBE, PERCUTANEOUS N/A 11/11/2023    Procedure: Replace Gastrojejunostomy Tube, Percutaneous;  Surgeon: Francisco Dodson MD;  [facility]    REPLACE GASTROJEJUNOSTOMY TUBE, PERCUTANEOUS N/A 12/8/2023    Procedure: Replace Gastrojejunostomy Tube, Percutaneous;  Surgeon: Mandeep Park MD;  [facility]    REPLACE GASTROSTOMY TUBE, PERCUTANEOUS N/A 10/31/2024    Procedure: REPLACEMENT, GASTROSTOMY TUBE, PERCUTANEOUS;  Surgeon: Mandeep Park MD;  [facility]    REPLACE JEJUNOSTOMY TUBE, PERCUTANEOUS N/A 09/10/2021    Procedure: UPPER ENDOSCOPY, REPLACEMENT OF PERCUTANEOUS GASTROJEJUNOSTOMY TUBE, T-TAG GASTROPEXY;  Surgeon: Zackery Montoya MD;  [facility]    REPLACE JEJUNOSTOMY TUBE, PERCUTANEOUS N/A 12/29/2021    Procedure: REPLACEMENT, JEJUNOSTOMY TUBE, PERCUTANEOUS;  Surgeon: Mandeep Park MD;  [facility]    REPLACE JEJUNOSTOMY TUBE, PERCUTANEOUS N/A 05/04/2023    Procedure: REPLACEMENT, JEJUNOSTOMY TUBE, PERCUTANEOUS;  Surgeon: Mandeep Park MD;  [facility]    REPLACE JEJUNOSTOMY TUBE, PERCUTANEOUS  08/01/2023    Procedure: Replace Jejunostomy Tube, Percutaneous;  Surgeon: Mandeep Park MD;  [facility]    REPLACE JEJUNOSTOMY TUBE, PERCUTANEOUS N/A 4/16/2024    Procedure: REPLACEMENT, JEJUNOSTOMY TUBE, PERCUTANEOUS;  Surgeon: Francisco Dodson MD;  [facility]    REPLACE  "JEJUNOSTOMY TUBE, PERCUTANEOUS N/A 10/31/2024    Procedure: REPLACEMENT, JEJUNOSTOMY TUBE, PERCUTANEOUS;  Surgeon: Mandeep Park MD;  [facility]    transphenoidal pituitary resection  1990    Z  DELIVERY ONLY  1996    Z  DELIVERY ONLY  1998    repeat c section with incidental cystotomy with repair    ZC EXCIS PITUITARY,TRANSNASAL/SEPTAL  1980    pituitary tumor removed for diabetes insipidus    [facility] TOTAL ABDOM HYSTERECTOMY  1999    w/ bilateral salpingoophorectomy      acetaminophen (TYLENOL) 325 MG/10.15ML solution  baclofen (LIORESAL) 10 MG tablet  cetirizine (ZYRTEC) 10 MG tablet  COMPOUNDED NON-CONTROLLED SUBSTANCE (CMPD RX) - PHARMACY TO MIX COMPOUNDED MEDICATION  COMPOUNDED NON-CONTROLLED SUBSTANCE (CMPD RX) - PHARMACY TO MIX COMPOUNDED MEDICATION  Continuous Blood Gluc Sensor (FREESTYLE LISA 3 SENSOR) MISC  Continuous Glucose Sensor (FREESTYLE LISA 3 SENSOR) MISC  cyclobenzaprine (FLEXERIL) 10 MG tablet  Digestive Enzyme Cartridge (RELIZORB) MARCO  Digestive Enzyme Cartridge (RELIZORB) Device  diphenhydrAMINE (BENADRYL) 12.5 MG/5ML solution  DULoxetine HCl 60 MG CSDR  Emergency Supply Kit, [facility],  estradiol (VAGIFEM) 10 MCG TABS vaginal tablet  famotidine (PEPCID) 40 MG/5ML suspension  glucagon 1 MG kit  glucose 40 % GEL gel  hydrOXYzine HCl (ATARAX) 25 MG tablet  ibuprofen (ADVIL/MOTRIN) 400 MG tablet  insulin aspart (NOVOLOG FLEXPEN) 100 UNIT/ML pen  insulin glargine (LANTUS PEN) 100 UNIT/ML pen  insulin regular 100 UNIT/ML injection  insulin syringe-needle U-100 (30G X 1/2\" 0.3 ML) 30G X 1/2\" 0.3 ML miscellaneous  [employer] 1.5 Peptide Plain 325 mL  lactated ringers in 1,000 mL via CADD pump  lactated ringers infusion  levothyroxine (SYNTHROID) 25 mcg/mL SUSP  lidocaine (LIDODERM) 5 % patch  lipase-protease-amylase (CREON) 01138-36039-979140 units CPEP per EC capsule  meclizine (ANTIVERT) 25 MG tablet  methocarbamol (ROBAXIN) 750 MG " tablet  miconazole (MICATIN) 2 % external powder  montelukast (SINGULAIR) 10 MG tablet  Multiple Vitamin (INFUVITE ADULT) injection  Nutritional Supplements (BOOST HIGH PROTEIN) LIQD  oxyCODONE (ROXICODONE) 5 MG tablet  oxyCODONE (ROXICODONE) 5 MG/5ML solution  pantoprazole (PROTONIX) 40 mg IV push injection  pantoprazole (PROTONIX) injection  parenteral nutrition - TNA - see order for formula  prochlorperazine (COMPAZINE) 10 MG tablet  prochlorperazine (COMPAZINE) 10 MG tablet  promethazine (PHENERGAN) injection  Prosource TF PO LIQD (PROSOURCE TF)  rimegepant (NURTEC) 75 MG ODT tablet  scopolamine (TRANSDERM) 1 MG/3DAYS 72 hr patch  Sharps Container (BD SHARPS ) MISC  silver nitrate (ARZOL SILVER NIT APPLICATORS) 75-25 % miscellaneous  sodium chloride 0.9% elastomeric pump  sodium chloride, PF, 0.9% PF flush  sodium chloride, PF, 0.9% PF flush  sodium chloride, PF, 0.9% PF flush  STATIN NOT PRESCRIBED (INTENTIONAL)  SYNDROS 5 MG/ML oral soln  traZODone (DESYREL) 50 MG tablet  zinc oxide - white petrolatum (CRITIC-AID THICK MOIST BARRIER) 20-51% PSTE topical paste  ZOLMitriptan (ZOMIG-ZMT) 5 MG ODT      Allergies   Allergen Reactions    Apple Juice Anaphylaxis    Corticosteroids Other (See Comments)     All oral, IV and injectable steroids are contraindicated (unless in life threatening situations) in [facility] transplant recipients. They can cause irreversible loss of islet cell function. Please contact patient's transplant care coordinator ADI Gaffney RN at [phone] [phone] and/or endocrinologist prior to administration.      Depakote [Divalproex Sodium] Other (See Comments)     Chest pain    Zithromax [Azithromycin Dihydrate] Anaphylaxis     Noted in 4/7/08 ER    Bromfenac      Other reaction(s): Headache    Codeine      Other reaction(s): Hallucinations    Darvocet [Propoxyphene N-Apap] Itching    Morphine Nausea and Vomiting and Rash    Nalbuphine Hcl Rash     RASH :nubaine     Bactrim [Sulfamethoxazole W-Trimethoprim] Other (See Comments) and Nausea and Vomiting     Severely low liver function.    Statins Other (See Comments)     Previous liver issues, risks vs benefits felt to not warrant statin.  Discussed Oct 2022 visit    Tape [Adhesive Tape] Blisters    Tramadol     Zofran [Ondansetron] Other (See Comments)     migraine    Flagyl [Metronidazole] Hives and Rash     Family History  Family History   Problem Relation Age of Onset    Lipids Mother     Hypertension Mother     Thyroid Disease Mother     Depression Mother     Angina Mother     GERD Mother     Skin Cancer Mother     Migraines Mother     Autoimmune Disease Mother     Hyperlipidemia Mother     Mental Illness Mother     Cerebrovascular Disease Mother         11/2023    Other Cancer Mother         skin-basil cell    Anxiety Disorder Mother     Post Operative Nausea and Vomiting Mother     Eye Disorder Father         cataract, detached retina    Myocardial Infarction Father 60    Lipids Father     Cerebrovascular Disease Father     Depression Father     Substance Abuse Father     Anesthesia Reaction Father         stroke right after surgery    Cataracts Father     Osteoarthritis Father     Ulcerative Colitis Father     Autoimmune Disease Father     Heart Disease Father     Hyperlipidemia Father     Mental Illness Father     Other Cancer Father         myloma    Anxiety Disorder Father     Interpersonal Violence Sister     Coronary Artery Disease Sister         angioplasty    GERD Sister     Substance Abuse Sister     Cerebrovascular Disease Sister         2005    Depression Sister     Thyroid Disease Sister     Autoimmune Disease Sister     Depression Sister     Mental Illness Sister     Substance Abuse Sister     Thyroid Disease Sister     Eye Disorder Maternal Grandmother         cataract    Thyroid Disease Maternal Grandmother     Diabetes Maternal Grandfather     Eye Disorder Paternal Grandmother         cataract    Diabetes  "Paternal Grandmother     Eye Disorder Paternal Grandfather         cataract    Diabetes Paternal Grandfather     Substance Abuse Paternal Grandfather     Eye Disorder Son         ptosis    Depression Son     Anxiety Disorder Son     Depression Son     Mental Illness Son     Anxiety Disorder Son     Heart Disease Paternal Aunt     Diabetes Paternal Aunt     Diabetes Paternal Uncle     Heart Disease Paternal Uncle     Depression Nephew     Anxiety Disorder Nephew     Thyroid Disease Nephew     Thyroid Disease Nephew     Anxiety Disorder Nephew     Diabetes Type 2  Cousin         paternal cousin    Autoimmune Disease Cousin      Social History   Social History     Tobacco Use    Smoking status: Former     Current packs/day: 0.00     Average packs/day: 0.5 packs/day for 6.3 years (3.2 ttl pk-yrs)     Types: Cigarettes     Start date: 1985     Quit date: 1992     Years since quittin.9    Tobacco comments:     no 2nd hand   Vaping Use    Vaping status: Some Days    Substances: THC, CBD   Substance Use Topics    Alcohol use: Not Currently     Alcohol/week: 3.0 - 6.0 standard drinks of alcohol     Types: 1 - 2 Glasses of wine, 1 - 2 Cans of beer, 1 - 2 Shots of liquor per week     Comment: none since IGG    Drug use: Yes     Comment: medical canabis tincture and vape      Past medical history, past surgical history, medications, allergies, family history, and social history were reviewed with the patient. No additional pertinent items.     A complete review of systems was performed with pertinent positives and negatives noted in the HPI, and all other systems negative.    Physical Exam   BP: 119/71  Pulse: 103  Temp: 97.8  F (36.6  C)  Resp: 16  Height: 172.7 cm (5' 8\")  Weight: 63.5 kg (140 lb)  SpO2: 97 %  Physical Exam  Vitals and nursing note reviewed.   Constitutional:       General: She is not in acute distress.     Appearance: Normal appearance. She is well-developed. She is not ill-appearing or " diaphoretic.   HENT:      Head: Normocephalic and atraumatic.      Nose: Nose normal.      Mouth/Throat:      Mouth: Mucous membranes are moist.   Eyes:      General: No scleral icterus.     Conjunctiva/sclera: Conjunctivae normal.   Cardiovascular:      Rate and Rhythm: Normal rate.      Heart sounds: Normal heart sounds.   Pulmonary:      Effort: Pulmonary effort is normal. No respiratory distress.      Breath sounds: Normal breath sounds. No stridor.   Abdominal:      General: Abdomen is flat and protuberant. The ostomy site is clean. There is no distension.      Palpations: Abdomen is soft.      Tenderness: There is generalized abdominal tenderness. There is guarding. There is no rebound.      Comments: Gastrostomy tube in mid abdomen. J tube in ostomy tract with balloon external to abdominal wall, only inflated with about 2 ml's of water. Small amount of solid light bright soft debris around tube w/o a distinctive odor. No increased warmth or purulence.    Musculoskeletal:         General: No deformity or signs of injury. Normal range of motion.      Cervical back: Normal range of motion and neck supple. No rigidity.   Skin:     General: Skin is warm and dry.      Coloration: Skin is not jaundiced or pale.      Findings: No rash.   Neurological:      General: No focal deficit present.      Mental Status: She is alert and oriented to person, place, and time.   Psychiatric:         Mood and Affect: Mood normal.         Behavior: Behavior normal.           ED Course, Procedures, & Data      Procedures                Results for orders placed or performed during the hospital encounter of 11/17/24   CT Abdomen Pelvis w Contrast     Status: None    Narrative    EXAM: CT ABDOMEN PELVIS W CONTRAST  LOCATION: [facility]  DATE: 11/18/2024    INDICATION: abd infection abscess  COMPARISON: 10/21/2024  TECHNIQUE: CT scan of the abdomen and pelvis was performed following  injection of IV contrast. Multiplanar reformats were obtained. Dose reduction techniques were used.  CONTRAST: iopamidol (ISOVUE 370) solution 86 mL    FINDINGS:   LOWER CHEST: Dependent atelectasis in the posterior lung bases.    HEPATOBILIARY: Postcholecystectomy changes with stable pneumobilia.    PANCREAS: Pancreatitis changes with multiple clips in the pancreatic bed. The remaining pancreas is not well seen.    SPLEEN: Surgically absent. Liver extends into the left abdomen.    ADRENAL GLANDS: Normal.    KIDNEYS/BLADDER: Mild atrophy bilaterally. The bilateral kidneys enhance symmetrically without evidence for hydronephrosis or pyelonephritis. No renal masses or calculi noted. The bilateral ureters and urinary bladder are unremarkable.    BOWEL: Percutaneous gastrostomy tube enters the distal stomach with the tip likely in the first portion of the duodenum. Percutaneous jejunostomy tube in a similar position to prior examination. Jejunostomy balloon no longer seen. Clinical correlation   for function of the tube recommended. Dilated loops of small bowel in the left upper and mid abdomen with decompressed loops of bowel in the remainder of the abdomen. No clear transition point seen. Findings appear similar to prior exam. Percutaneous   right lower quadrant ileostomy. Rectal stump  suture appears intact.    LYMPH NODES: No lymphadenopathy.    VASCULATURE: No abdominal aortic aneurysm.    PELVIC ORGANS: No pelvic masses. No pelvic free fluid. Uterus is surgically absent.    MUSCULOSKELETAL: No concerning osseous abnormalities. No inguinal or ventral hernias.      Impression    IMPRESSION:   1.  Percutaneous gastrostomy tube enters the distal stomach with the tip likely in the first portion of the duodenum. Percutaneous jejunostomy tube in a similar position to prior examination. Jejunostomy balloon no longer seen. Clinical correlation for   function of the tube recommended.  2.  Dilated loops of small bowel in the  left upper and mid abdomen with decompressed loops of bowel in the remainder of the abdomen. No clear transition point seen. Findings appear similar to prior exam.  3.  Percutaneous right lower quadrant ileostomy. Rectal stump suture appears intact.     Comprehensive metabolic panel     Status: Abnormal   Result Value Ref Range    Sodium 139 135 - 145 mmol/L    Potassium 4.1 3.4 - 5.3 mmol/L    Carbon Dioxide (CO2) 25 22 - 29 mmol/L    Anion Gap 11 7 - 15 mmol/L    Urea Nitrogen 12.1 6.0 - 20.0 mg/dL    Creatinine 0.78 0.51 - 0.95 mg/dL    GFR Estimate 88 >60 mL/min/1.73m2    Calcium 9.2 8.8 - 10.4 mg/dL    Chloride 103 98 - 107 mmol/L    Glucose 51 (L) 70 - 99 mg/dL    Alkaline Phosphatase 265 (H) 40 - 150 U/L    AST 30 0 - 45 U/L    ALT 48 0 - 50 U/L    Protein Total 7.1 6.4 - 8.3 g/dL    Albumin 3.8 3.5 - 5.2 g/dL    Bilirubin Total 0.2 <=1.2 mg/dL   Lipase     Status: Abnormal   Result Value Ref Range    Lipase 6 (L) 13 - 60 U/L   Lactic acid whole blood with 1x repeat in 2 hr when >2     Status: Normal   Result Value Ref Range    Lactic Acid, Initial 0.8 0.7 - 2.0 mmol/L   CRP inflammation     Status: Abnormal   Result Value Ref Range    CRP Inflammation 34.50 (H) <5.00 mg/L   Erythrocyte sedimentation rate auto     Status: Abnormal   Result Value Ref Range    Erythrocyte Sedimentation Rate 47 (H) 0 - 30 mm/hr   CBC with platelets and differential     Status: Abnormal   Result Value Ref Range    WBC Count 12.0 (H) 4.0 - 11.0 10e3/uL    RBC Count 3.38 (L) 3.80 - 5.20 10e6/uL    Hemoglobin 9.6 (L) 11.7 - 15.7 g/dL    Hematocrit 31.5 (L) 35.0 - 47.0 %    MCV 93 78 - 100 fL    MCH 28.4 26.5 - 33.0 pg    MCHC 30.5 (L) 31.5 - 36.5 g/dL    RDW 13.4 10.0 - 15.0 %    Platelet Count 600 (H) 150 - 450 10e3/uL    % Neutrophils 50 %    % Lymphocytes 27 %    % Monocytes 13 %    % Eosinophils 9 %    % Basophils 1 %    % Immature Granulocytes 0 %    NRBCs per 100 WBC 0 <1 /100    Absolute Neutrophils 5.9 1.6 - 8.3 10e3/uL     Absolute Lymphocytes 3.3 0.8 - 5.3 10e3/uL    Absolute Monocytes 1.6 (H) 0.0 - 1.3 10e3/uL    Absolute Eosinophils 1.1 (H) 0.0 - 0.7 10e3/uL    Absolute Basophils 0.1 0.0 - 0.2 10e3/uL    Absolute Immature Granulocytes 0.0 <=0.4 10e3/uL    Absolute NRBCs 0.0 10e3/uL   Glucose by meter     Status: Abnormal   Result Value Ref Range    GLUCOSE BY METER POCT 120 (H) 70 - 99 mg/dL   CBC with platelets differential     Status: Abnormal    Narrative    The following orders were created for panel order CBC with platelets differential.  Procedure                               Abnormality         Status                     ---------                               -----------         ------                     CBC with platelets and d...[[id number]]  Abnormal            Final result               RBC and Platelet Morphology[[id number]]                                                   Please view results for these tests on the individual orders.     Medications   HYDROmorphone (PF) (DILAUDID) injection 0.5 mg (0.5 mg Intravenous $Given 11/18/24 0149)   prochlorperazine (COMPAZINE) injection 10 mg (10 mg Intravenous $Given 11/17/24 2316)   iopamidol (ISOVUE-370) solution 86 mL (86 mLs Intravenous $Given 11/18/24 0017)   sodium chloride (PF) 0.9% PF flush 72 mL (72 mLs Intravenous $Given 11/18/24 0017)     Labs Ordered and Resulted from Time of ED Arrival to Time of ED Departure   COMPREHENSIVE METABOLIC PANEL - Abnormal       Result Value    Sodium 139      Potassium 4.1      Carbon Dioxide (CO2) 25      Anion Gap 11      Urea Nitrogen 12.1      Creatinine 0.78      GFR Estimate 88      Calcium 9.2      Chloride 103      Glucose 51 (*)     Alkaline Phosphatase 265 (*)     AST 30      ALT 48      Protein Total 7.1      Albumin 3.8      Bilirubin Total 0.2     LIPASE - Abnormal    Lipase 6 (*)    CRP INFLAMMATION - Abnormal    CRP Inflammation 34.50 (*)    ERYTHROCYTE SEDIMENTATION RATE AUTO - Abnormal    Erythrocyte Sedimentation  Rate 47 (*)    CBC WITH PLATELETS AND DIFFERENTIAL - Abnormal    WBC Count 12.0 (*)     RBC Count 3.38 (*)     Hemoglobin 9.6 (*)     Hematocrit 31.5 (*)     MCV 93      MCH 28.4      MCHC 30.5 (*)     RDW 13.4      Platelet Count 600 (*)     % Neutrophils 50      % Lymphocytes 27      % Monocytes 13      % Eosinophils 9      % Basophils 1      % Immature Granulocytes 0      NRBCs per 100 WBC 0      Absolute Neutrophils 5.9      Absolute Lymphocytes 3.3      Absolute Monocytes 1.6 (*)     Absolute Eosinophils 1.1 (*)     Absolute Basophils 0.1      Absolute Immature Granulocytes 0.0      Absolute NRBCs 0.0     GLUCOSE BY METER - Abnormal    GLUCOSE BY METER POCT 120 (*)    LACTIC ACID WHOLE BLOOD WITH 1X REPEAT IN 2 HR WHEN >2 - Normal    Lactic Acid, Initial 0.8       CT Abdomen Pelvis w Contrast   Final Result   IMPRESSION:    1.  Percutaneous gastrostomy tube enters the distal stomach with the tip likely in the first portion of the duodenum. Percutaneous jejunostomy tube in a similar position to prior examination. Jejunostomy balloon no longer seen. Clinical correlation for    function of the tube recommended.   2.  Dilated loops of small bowel in the left upper and mid abdomen with decompressed loops of bowel in the remainder of the abdomen. No clear transition point seen. Findings appear similar to prior exam.   3.  Percutaneous right lower quadrant ileostomy. Rectal stump suture appears intact.                    Assessment & Plan    Dinora Mcghee is a 59 year old female WITH PMH type 1 diabetes, chronic pancreatitis, s/p prior TPAIT in 2016, hypothyroidism, h/o pituitary adenoma s/p prior resection, slow transit constipation, nausea, emesis, G-tube for venting (vents about 75% of the time, J-tube for tube feeds (runs at about 90mL/ hour over 13 hours), and TPN every Tues, Thurs, Sundays, also home IVF prn, chronic pain on buprenorphine with [facility], now s/p Ex Lap, BAUDILOI, revision of J-tube,  small bowel resection, TAC with ileorectal anastomosis and diverting loop ileostomy, flexible sigmoidoscopy on 10/11/2024 who presents to the ED with a Gtube problem.     Ddx: J tube dislodgement, J tube tract infection/abscess, intraperitoneal abscess, iatrogenic bowel perforation, enterocutaneous fistula, cellulitis    Patient appears well hydrated. She has been eating food and drinking fluid PO. Was hypoglycemic to 51 on arrival despite eating ice cream earlier. She was self-administering glucagon IM when I walked in the room. She states she often randomly gets hypoglycemic. Recheck glucose 120.     GI provider called in requesting patient get CT abd/pelv with contrast to eval for abscess in abdominal wall. Also requested admission for J tube replacement tomorrow.     Labs notable for normal creatinine, sodium, potassium, liver enzymes and bilirubin.  Normal lipase.  White count 12 which is stable.  Hemoglobin 9.6 which is also improved from baseline.  Platelets down to 600 from the thousands.  Lactate normal.    Patient getting Dilaudid for pain and Compazine for nausea.  No clinical evidence of significant dehydration so with fluid shortage she did not receive IV fluids.    CT abdomen pelvis shows percutaneous gastrostomy tube in the distal stomach with tip likely in first portion of duodenum.  Percutaneous jejunostomy tube in similar position to prior examination but balloon is no longer seen.  Dilated loops of small bowel in the left upper and mid abdomen with decompressed loops of bowel in the remainder of the abdomen.  No clear transition point.  Findings are similar to previous exam.  Percutaneous right lower quadrant ileostomy.  Rectal stump appears intact.  After patient returned from the CT scan her tube had completely dislodged from the ostomy tract.  Given the complicated nature of her anatomy we did not place anything in the tract.  Patient admitted to medicine for ongoing pain control and GI consult  in the morning for anticipated J-tube replacement.      I have reviewed the nursing notes. I have reviewed the findings, diagnosis, plan and need for follow up with the patient.    New Prescriptions    No medications on file       Final diagnoses:   Dislodged jejunostomy tube   Abdominal pain, unspecified abdominal location   IMelinda, am serving as a trained medical scribe to document services personally performed by Quyen Lowe MD, based on the provider's statements to me.     IQuyen MD, was physically present and have reviewed and verified the accuracy of this note documented by Melinda Davila.     Quyen Lowe MD  [facility] EMERGENCY DEPARTMENT  11/17/2024     Quyen Lowe MD  11/18/24 0244

## 2024-11-19 ENCOUNTER — HOME INFUSION BILLING (OUTPATIENT)
Dept: HOME HEALTH SERVICES | Facility: HOME HEALTH | Age: 59
End: 2024-11-19
Payer: COMMERCIAL

## 2024-11-19 VITALS
TEMPERATURE: 98.4 F | OXYGEN SATURATION: 99 % | RESPIRATION RATE: 16 BRPM | SYSTOLIC BLOOD PRESSURE: 122 MMHG | HEIGHT: 68 IN | HEART RATE: 111 BPM | BODY MASS INDEX: 21.22 KG/M2 | DIASTOLIC BLOOD PRESSURE: 76 MMHG | WEIGHT: 140 LBS

## 2024-11-19 DIAGNOSIS — Z53.9 DIAGNOSIS NOT YET DEFINED: Primary | ICD-10-CM

## 2024-11-19 LAB
ANION GAP SERPL CALCULATED.3IONS-SCNC: 13 MMOL/L (ref 7–15)
BUN SERPL-MCNC: 8.8 MG/DL (ref 6–20)
CALCIUM SERPL-MCNC: 9.8 MG/DL (ref 8.8–10.4)
CHLORIDE SERPL-SCNC: 99 MMOL/L (ref 98–107)
CREAT SERPL-MCNC: 0.54 MG/DL (ref 0.51–0.95)
EGFRCR SERPLBLD CKD-EPI 2021: >90 ML/MIN/1.73M2
ERYTHROCYTE [DISTWIDTH] IN BLOOD BY AUTOMATED COUNT: 13.2 % (ref 10–15)
GLUCOSE BLDC GLUCOMTR-MCNC: 132 MG/DL (ref 70–99)
GLUCOSE BLDC GLUCOMTR-MCNC: 132 MG/DL (ref 70–99)
GLUCOSE BLDC GLUCOMTR-MCNC: 133 MG/DL (ref 70–99)
GLUCOSE BLDC GLUCOMTR-MCNC: 140 MG/DL (ref 70–99)
GLUCOSE BLDC GLUCOMTR-MCNC: 178 MG/DL (ref 70–99)
GLUCOSE SERPL-MCNC: 130 MG/DL (ref 70–99)
HCO3 SERPL-SCNC: 25 MMOL/L (ref 22–29)
HCT VFR BLD AUTO: 35.4 % (ref 35–47)
HGB BLD-MCNC: 11.1 G/DL (ref 11.7–15.7)
MCH RBC QN AUTO: 28 PG (ref 26.5–33)
MCHC RBC AUTO-ENTMCNC: 31.4 G/DL (ref 31.5–36.5)
MCV RBC AUTO: 89 FL (ref 78–100)
PHOSPHATE SERPL-MCNC: 2.9 MG/DL (ref 2.5–4.5)
PLATELET # BLD AUTO: 552 10E3/UL (ref 150–450)
POTASSIUM SERPL-SCNC: 4.1 MMOL/L (ref 3.4–5.3)
RBC # BLD AUTO: 3.97 10E6/UL (ref 3.8–5.2)
SODIUM SERPL-SCNC: 137 MMOL/L (ref 135–145)
WBC # BLD AUTO: 9.4 10E3/UL (ref 4–11)

## 2024-11-19 PROCEDURE — 999N000197 HC STATISTIC WOC PT EDUCATION, 0-15 MIN

## 2024-11-19 PROCEDURE — 250N000013 HC RX MED GY IP 250 OP 250 PS 637

## 2024-11-19 PROCEDURE — 250N000013 HC RX MED GY IP 250 OP 250 PS 637: Performed by: PHYSICIAN ASSISTANT

## 2024-11-19 PROCEDURE — 82435 ASSAY OF BLOOD CHLORIDE: CPT | Performed by: PHYSICIAN ASSISTANT

## 2024-11-19 PROCEDURE — 250N000011 HC RX IP 250 OP 636

## 2024-11-19 PROCEDURE — 99239 HOSP IP/OBS DSCHRG MGMT >30: CPT | Performed by: PHYSICIAN ASSISTANT

## 2024-11-19 PROCEDURE — 96376 TX/PRO/DX INJ SAME DRUG ADON: CPT

## 2024-11-19 PROCEDURE — G0378 HOSPITAL OBSERVATION PER HR: HCPCS

## 2024-11-19 PROCEDURE — 36591 DRAW BLOOD OFF VENOUS DEVICE: CPT | Performed by: PHYSICIAN ASSISTANT

## 2024-11-19 PROCEDURE — 82962 GLUCOSE BLOOD TEST: CPT

## 2024-11-19 PROCEDURE — 250N000009 HC RX 250

## 2024-11-19 PROCEDURE — 80048 BASIC METABOLIC PNL TOTAL CA: CPT | Performed by: PHYSICIAN ASSISTANT

## 2024-11-19 PROCEDURE — 85014 HEMATOCRIT: CPT | Performed by: PHYSICIAN ASSISTANT

## 2024-11-19 PROCEDURE — 82565 ASSAY OF CREATININE: CPT | Performed by: PHYSICIAN ASSISTANT

## 2024-11-19 PROCEDURE — 84100 ASSAY OF PHOSPHORUS: CPT | Performed by: PEDIATRICS

## 2024-11-19 PROCEDURE — 99233 SBSQ HOSP IP/OBS HIGH 50: CPT | Mod: 24

## 2024-11-19 RX ORDER — BACLOFEN 10 MG/1
10-20 TABLET ORAL 3 TIMES DAILY
Status: SHIPPED
Start: 2024-11-19

## 2024-11-19 RX ORDER — OXYCODONE HYDROCHLORIDE 5 MG/1
5-10 TABLET ORAL EVERY 6 HOURS PRN
Qty: 8 TABLET | Refills: 0 | Status: SHIPPED | OUTPATIENT
Start: 2024-11-19 | End: 2024-11-20

## 2024-11-19 RX ORDER — CETIRIZINE HYDROCHLORIDE 10 MG/1
10 TABLET ORAL EVERY MORNING
Status: SHIPPED
Start: 2024-11-19

## 2024-11-19 RX ORDER — DEXTROSE MONOHYDRATE 100 MG/ML
INJECTION, SOLUTION INTRAVENOUS CONTINUOUS PRN
Status: DISCONTINUED | OUTPATIENT
Start: 2024-11-19 | End: 2024-11-19 | Stop reason: HOSPADM

## 2024-11-19 RX ADMIN — PROCHLORPERAZINE EDISYLATE 10 MG: 5 INJECTION INTRAMUSCULAR; INTRAVENOUS at 07:06

## 2024-11-19 RX ADMIN — CYCLOBENZAPRINE HYDROCHLORIDE 10 MG: 5 TABLET, FILM COATED ORAL at 12:42

## 2024-11-19 RX ADMIN — PANCRELIPASE 2 CAPSULE: 120000; 24000; 76000 CAPSULE, DELAYED RELEASE PELLETS ORAL at 12:33

## 2024-11-19 RX ADMIN — BACLOFEN 20 MG: 10 TABLET ORAL at 12:35

## 2024-11-19 RX ADMIN — ACETAMINOPHEN 650 MG: 325 TABLET, FILM COATED ORAL at 09:16

## 2024-11-19 RX ADMIN — METHOCARBAMOL TABLETS 750 MG: 750 TABLET, COATED ORAL at 09:02

## 2024-11-19 RX ADMIN — CYCLOBENZAPRINE HYDROCHLORIDE 10 MG: 5 TABLET, FILM COATED ORAL at 03:19

## 2024-11-19 RX ADMIN — BACLOFEN 20 MG: 10 TABLET ORAL at 15:04

## 2024-11-19 RX ADMIN — HYDROXYZINE HYDROCHLORIDE 25 MG: 25 TABLET, FILM COATED ORAL at 09:02

## 2024-11-19 RX ADMIN — PANCRELIPASE 3 CAPSULE: 120000; 24000; 76000 CAPSULE, DELAYED RELEASE PELLETS ORAL at 15:07

## 2024-11-19 RX ADMIN — LEVOTHYROXINE SODIUM 175 MCG: 100 SOLUTION ORAL at 12:39

## 2024-11-19 RX ADMIN — PANTOPRAZOLE SODIUM 40 MG: 40 INJECTION, POWDER, FOR SOLUTION INTRAVENOUS at 08:52

## 2024-11-19 RX ADMIN — OXYCODONE HYDROCHLORIDE 10 MG: 10 TABLET ORAL at 07:04

## 2024-11-19 RX ADMIN — FAMOTIDINE 20 MG: 10 INJECTION, SOLUTION INTRAVENOUS at 15:11

## 2024-11-19 ASSESSMENT — ACTIVITIES OF DAILY LIVING (ADL)
ADLS_ACUITY_SCORE: 0
DEPENDENT_IADLS:: INDEPENDENT
ADLS_ACUITY_SCORE: 0

## 2024-11-19 NOTE — DISCHARGE SUMMARY
[facility]  Hospitalist Discharge Summary      Date of Admission:  11/17/2024  Date of Discharge:  11/19/2024  Discharging Provider: Venus Velásquez PA-C; Dr. Chris Bhatti   Discharge Service: Hospitalist Service    Discharge Diagnoses   Abdominal pain   Displaced J tube  Type 1 diabetes mellitus  S/p TPIAT  GERD  Hypothyroidism   Hx of pituitary adenoma s/p resection   Migraines    Clinically Significant Risk Factors          Follow-ups Needed After Discharge   Follow-up Appointments       Adult [facility] Follow-up and recommended labs and tests      Follow up with primary care provider, Juan Jose Gomez, within 7 days for hospital follow- up.    Please keep your appointment to follow up with Dr. Genao in GI clinic on 11/20 at 8:00AM  Please keep your appointment to follow up with Dr. Navarro in Surgery clinic on 11/20 at 5:45PM    Appointments on [place] and/or [facility] (with [facility] or [facility] provider or service). Call [phone] if you haven't heard regarding these appointments within 7 days of discharge.                Unresulted Labs Ordered in the Past 30 Days of this Admission       No orders found from 10/18/2024 to 11/18/2024.            Discharge Disposition   Discharged to home  Condition at discharge: Stable    Hospital Course   Dinora Mcghee is a 59 year old female w/ PMH type 1 DM, chronic pancreatitis s/p TPAIT 12/2016, pituitary adenoma s/p resection, hypothyroidism, GERD, migraines, gastroparesis and slow transit constipation s/p ex lap, revision of J tube, small bowel resection w/ ileorectal anastomosis and diverting loop ileostomy 10/11/2024 admitted on 11/18/2024 after her J tube dislodged. Gi consulted with no plans to replace at this time. Recommended gradually advancing diet, which patient was able to tolerate. Patient was monitored with initial nausea and vomiting with resumption of her diet, possibly in the setting of a  migraine. Re-attempted re-introducing diet and patient was able to tolerate without difficulty. Patient requesting to discharge 11/19 in order to attend outpatient visits with Surgery and GI. Discussed with patient and family preference to continue to monitor overnight, but discussed with our GI team here and agreed with discharge 11/19 with the recommendation to keep follow up and close monitoring on discharge. Patient was able to tolerate full liquid, per discussion with GI, noted ok to discharge if able to tolerate full liquid. Discussed precautions for return. Patient was discharged on regular diet and TPN as to be continued to be adjusted by OP dietician. Discussed precautions for return and recommendations for follow up.       Abdominal pain  Displaced J tube  Hx chronic pancreatitis, gastroparesis, slow transit constipation w/ complicated surgical history including TPAIT 12/2016. Most recently s/p ex lap, revision of PEG-J tube, small bowel resection w/ ileorectal anastomosis and diverting loop ileostomy on 10/11/2024. Presented to ED 11/17 w/ abdominal pain, decreased ostomy output, and J tube balloon on top of skin -> subsequently fell completely out around 0100 on 11/18. CT prior to tube falling out w/ no obvious fluid collection or abscess, dilated loops of small bowel w/ decompressed loops in remainder of bowel similar to prior, stable ileostomy. GI evaluating for J tube replacement, but will defer at this time. Pain suspected to be 2/2 malpositioned J tube, improved somewhat now that it's out. Low suspicion for infection given stable leukocytosis, reassuring CT, and absence of systemic symptoms thus held off on abx. No obvious obstruction on CT, decreased ostomy output possibly in s/o not getting TF. Nutrition consulted. Patient strongly preferred to discharge on 11/19. Discussed recommendation to monitor overnight, but patient opted to stay to attend OP visits. Discussed with GI and Nutrition,  gradually advanced diet, which patient was able to tolerate. She will discharge with resumption of TPN per \[facility]\"" dietician and regular diet. Per discussion with patient, she is able to take her meds orally therefore will   -Continue \[facility]\"" TPN   -Full liquid diet, discussed gradual advancement to regular with patient  -Holding TF for now with J tube out   -Pain control: PO oxycodone 5-10 mg Q6H PRN (patient's home regimen), ordered 8 additional tablets on discharge     Migraine  Nausea and vomiting 11/18 per patient reportedly in the setting of  holding PTA meds.   -Resume PTA meds      Type 1 DM s/p TPIAT 12/2016  PTA on glargine 3 units daily w/ sliding scale. Initially hypoglycemic to 51 in ED, patient utilized her own glucagon to correct w/ repeat 120.   -Hold off on glargine and sliding scale on discharge      GERD: Continue PTA pantoprazole (per patient, was able to take orally)   Hypothyroidism, Hx pituitary adenoma s/p resection: Continue PTA levothyroxine (per patient, was able to take orally)       Consultations This Hospital Stay   MEDICATION HISTORY IP PHARMACY CONSULT  PHARMACY/NUTRITION TO START AND MANAGE TPN  GI PANCREATICOBILIARY ADULT IP CONSULT  PHARMACY IP CONSULT  WOUND OSTOMY CONTINENCE NURSE  IP CONSULT  CARE MANAGEMENT / SOCIAL WORK IP CONSULT    Code Status   No CPR- Do NOT Intubate    Time Spent on this Encounter   I, Venus Velásquez PA-C, personally saw the patient today and spent greater than 30 minutes discharging this patient.       Venus Velásquez PA-C  [facility] [address] OBSERVATION  [address] [facility] [address]  Phone: [phone]  Fax: [phone]  ______________________________________________________________________    Physical Exam   Vital Signs: Temp: 98.4  F (36.9  C) Temp src: Oral BP: 122/76 Pulse: 111   Resp: 16 SpO2: 99 % O2 Device: None (Room air)    General: laying in bed, alert, cooperative, awake, in no acute distress  HEENT:  normocephalic, atraumatic, anicteric sclera  Respiratory: breathing comfortably on room air, clear to auscultation bilaterally without wheezing, crackles, or rhonchi appreciated  Cardiac: regular rate and rhythm with normal S1/S2 without murmurs, clicks, rubs or gallops, 2+ radial pulse on LUE, no signs of peripheral edema bilaterally  GI: soft, non-distended, hypoactive bowel sounds, PEG in place with no drainage surrounding or erythema, dressing over old PEJ site which is c/d/i  Neuro: grossly non-focal, alert and oriented, normal speech  MSK: no bony deformities, moving all extremities independently  Skin: no rashes or lesions on uncovered surfaces, no jaundice other than what was mentioned above          Primary Care Physician   Juan Jose Gomez    Discharge Orders      Resume Home Care Services     Reason for your hospital stay    You were in the hospital for abdominal pain and dislodged J tube. You were evaluated by the Nutrition team, GI team. Given improvement of your oral intake, it was deferred to have J tube replaced at this time. Your diet was gradually resumed without worsening of your symptoms or nausea and vomiting. You were recommended to follow up with your outpatient GI team as well as your surgical team. With your diet being gradually advanced, please hold off on taking glargine (lantus) insulin.     Activity    Your activity upon discharge: activity as tolerated     Adult [facility] Follow-up and recommended labs and tests    Follow up with primary care provider, Juan Jose Gomez, within 7 days for hospital follow- up.    Please keep your appointment to follow up with Dr. Genao in GI clinic on 11/20 at 8:00AM  Please keep your appointment to follow up with Dr. Navarro in Surgery clinic on 11/20 at 5:45PM    Appointments on [place] and/or [facility] (with [facility] or [facility] provider or service). Call [phone] if you haven't heard regarding these appointments within 7 days of discharge.     When  to contact your care team    Please seek medical attention if you develop any worsening of your symptoms, abdominal pain, nausea vomiting or are unable to tolerate intake. Please seek medical attention if you develop any chest pain or difficulty breathing.     Diet    Follow this diet upon discharge: Current Diet:Orders Placed This Encounter      Snacks/Supplements Adult: Ensure Enlive; Between Meals      Full Liquid Diet, you may attempt to gradually advance to a regular diet as you are able to tolerate.       Significant Results and Procedures   Results for orders placed or performed during the hospital encounter of 11/17/24   CT Abdomen Pelvis w Contrast    Narrative    EXAM: CT ABDOMEN PELVIS W CONTRAST  LOCATION: [facility]  DATE: 11/18/2024    INDICATION: abd infection abscess  COMPARISON: 10/21/2024  TECHNIQUE: CT scan of the abdomen and pelvis was performed following injection of IV contrast. Multiplanar reformats were obtained. Dose reduction techniques were used.  CONTRAST: iopamidol (ISOVUE 370) solution 86 mL    FINDINGS:   LOWER CHEST: Dependent atelectasis in the posterior lung bases.    HEPATOBILIARY: Postcholecystectomy changes with stable pneumobilia.    PANCREAS: Pancreatitis changes with multiple clips in the pancreatic bed. The remaining pancreas is not well seen.    SPLEEN: Surgically absent. Liver extends into the left abdomen.    ADRENAL GLANDS: Normal.    KIDNEYS/BLADDER: Mild atrophy bilaterally. The bilateral kidneys enhance symmetrically without evidence for hydronephrosis or pyelonephritis. No renal masses or calculi noted. The bilateral ureters and urinary bladder are unremarkable.    BOWEL: Percutaneous gastrostomy tube enters the distal stomach with the tip likely in the first portion of the duodenum. Percutaneous jejunostomy tube in a similar position to prior examination. Jejunostomy balloon no longer seen. Clinical correlation   for  function of the tube recommended. Dilated loops of small bowel in the left upper and mid abdomen with decompressed loops of bowel in the remainder of the abdomen. No clear transition point seen. Findings appear similar to prior exam. Percutaneous   right lower quadrant ileostomy. Rectal stump  suture appears intact.    LYMPH NODES: No lymphadenopathy.    VASCULATURE: No abdominal aortic aneurysm.    PELVIC ORGANS: No pelvic masses. No pelvic free fluid. Uterus is surgically absent.    MUSCULOSKELETAL: No concerning osseous abnormalities. No inguinal or ventral hernias.      Impression    IMPRESSION:   1.  Percutaneous gastrostomy tube enters the distal stomach with the tip likely in the first portion of the duodenum. Percutaneous jejunostomy tube in a similar position to prior examination. Jejunostomy balloon no longer seen. Clinical correlation for   function of the tube recommended.  2.  Dilated loops of small bowel in the left upper and mid abdomen with decompressed loops of bowel in the remainder of the abdomen. No clear transition point seen. Findings appear similar to prior exam.  3.  Percutaneous right lower quadrant ileostomy. Rectal stump suture appears intact.     XR Abdomen Port 1 View    Narrative    EXAMINATION:  XR ABDOMEN PORT 1 VIEW 11/18/2024     COMPARISON: CT 11/17/2024, radiographs 3/8/2024    HISTORY: Vomiting, J tube dislodged.    TECHNIQUE: Two frontal supine views of the abdomen.    FINDINGS: Percutaneous gastrostomy tube in place. No abnormally  dilated air-filled loops of bowel. The lung bases are unremarkable.  Right lower quadrant ostomy. No jejunostomy tube is visualized      Impression    IMPRESSION:   Non-obstructive bowel gas pattern. Gastrostomy tube in place. Presence  within the stomach may be confirmed by injecting a small amount of  contrast. No jejunostomy tube is visualized.    I have personally reviewed the examination and initial interpretation  and I agree with the  findings.    CARINE BOYD DO         SYSTEM ID:  [id number]     *Note: Due to a large number of results and/or encounters for the requested time period, some results have not been displayed. A complete set of results can be found in Results Review.       Discharge Medications   Current Discharge Medication List        CONTINUE these medications which have CHANGED    Details   baclofen (LIORESAL) 10 MG tablet Take 1-2 tablets (10-20 mg) by mouth 3 times daily.    Associated Diagnoses: Abdominal pain, generalized      cetirizine (ZYRTEC) 10 MG tablet Take 1 tablet (10 mg) by mouth every morning.    Associated Diagnoses: Seasonal allergic conjunctivitis      DULoxetine HCl 60 MG CSDR Take 1 capsule (60 mg) by mouth daily. Sprinkle caps for feeding tube    Associated Diagnoses: Situational depression      levothyroxine (SYNTHROID) 25 mcg/mL SUSP Take 7 mLs (175 mcg) by mouth daily.    Associated Diagnoses: Hypothyroidism, unspecified type      oxyCODONE (ROXICODONE) 5 MG tablet Take 1-2 tablets (5-10 mg) by mouth every 6 hours as needed for pain.  Qty: 8 tablet, Refills: 0    Associated Diagnoses: Acute post-operative pain           CONTINUE these medications which have NOT CHANGED    Details   acetaminophen (TYLENOL) 325 MG/10.15ML solution 20.3 mLs (650 mg) by Per J Tube route every 6 hours as needed for mild pain or fever  Qty: 473 mL, Refills: 4    Associated Diagnoses: Gastroparesis      !! COMPOUNDED NON-CONTROLLED SUBSTANCE (CMPD RX) - PHARMACY TO MIX COMPOUNDED MEDICATION Administer 60 mg amitriptyline 2 mg/ml via G-J tube at bedtime  Qty: 900 mL, Refills: 3    Comments: Patient has had difficulty with crushing amitriptyline tablets and administering via tube. This has led to erratic absorption and noticeable side effects.  Associated Diagnoses: Abdominal pain, generalized      !! COMPOUNDED NON-CONTROLLED SUBSTANCE (CMPD RX) - PHARMACY TO MIX COMPOUNDED MEDICATION Administer 40 mg amitriptyline suspension (2  "mg/mL) via G-J tube at bedtime.  Qty: 600 mL, Refills: 5    Comments: Patient has had difficulty with crushing amitriptyline tablets and administering via tube. This has led to erratic absorption and noticeable side effects.  Associated Diagnoses: Abdominal pain, generalized      !! Continuous Blood Gluc Sensor (FREESTYLE LISA 3 SENSOR) MISC CHANGE EVERY 14 DAYS  Qty: 2 each, Refills: 11    Associated Diagnoses: Type 1 diabetes mellitus with hypoglycemia and without coma (H)      !! Continuous Glucose Sensor (FREESTYLE LISA 3 SENSOR) MISC Change every 14 days.  Qty: 6 each, Refills: 1    Associated Diagnoses: S/P pancreatic islet cell transplantation (H); Type 1 diabetes mellitus without complication (H)      cyclobenzaprine (FLEXERIL) 10 MG tablet Take 0.5-1 tablets (5-10 mg) by mouth every 8 hours as needed for muscle spasms.  Qty: 60 tablet, Refills: 4    Comments: The patient requests that this prescription be held on file for filling in the near future.  Associated Diagnoses: Acute post-operative pain      !! Digestive Enzyme Cartridge (RELIZORB) MARCO 2 Cartridges daily  Qty: 60 each, Refills: 6    Associated Diagnoses: Pancreatic insufficiency      !! Digestive Enzyme Cartridge (RELIZORB) Device Place 3 each (3 Devices) into GJ tube daily. Administer as  3 device daily  Qty: 90 each, Refills: 8    Associated Diagnoses: Unspecified severe protein-calorie malnutrition (H); Gastroparesis      diphenhydrAMINE (BENADRYL) 12.5 MG/5ML solution Take 25 mg by mouth 4 times daily as needed for other (nausea)      Emergency Supply Kit, Central, Patient use for emergency only. Contents: 3 sodium chloride 0.9% flushes, 1 dressing kit, 1 microclave ext set 14\", 4 nitrile gloves (med), 6 alcohol prep pads, 1 bacitracin, 1 syringe (10 cc 20 G 1\"). Call [phone] to reorder.  Qty: [id number] kit, Refills: 0    Associated Diagnoses: Unspecified severe protein-calorie malnutrition (H)      estradiol (VAGIFEM) 10 MCG TABS " vaginal tablet INSERT 1 TABLET(10 MCG) VAGINALLY 2 TIMES A WEEK  Qty: 24 tablet, Refills: 2    Comments: The patient requests that this prescription be held on file for filling in the near future.  Associated Diagnoses: Atrophic vaginitis      famotidine (PEPCID) 40 MG/5ML suspension Place 2.5 mLs (20 mg) into J tube every evening.  Qty: 100 mL, Refills: 1    Associated Diagnoses: Gastroparesis      glucagon 1 MG kit Give 0.1 to 0.15mg( 10-15 units on Insulin sryinge) subcutaneous  every 15 minutes PRN for hypoglycemia. Remix new kit q24hr. Needs up to 3 kit/week.  Qty: 10 each, Refills: 3    Associated Diagnoses: Hypoglycemia      glucose 40 % GEL gel Take 15-30 g by mouth every 15 minutes as needed for low blood sugar  Qty: 3 Tube, Refills: 2    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      hydrOXYzine HCl (ATARAX) 25 MG tablet Take 1 tablet (25 mg) by mouth every 6 hours as needed for anxiety or other (adjuvant pain).  Qty: 60 tablet, Refills: 3    Comments: Correcting associated diagnosis  Associated Diagnoses: Anxiety      ibuprofen (ADVIL/MOTRIN) 400 MG tablet take 30 mls  by oral route  every 4 - 6 hours as needed      insulin aspart (NOVOLOG FLEXPEN) 100 UNIT/ML pen Take 2 units with meals OR use insulin carbohydrate ratio 1 unit per 15 grams of carbohydrates three time daily with meals/snacks plus correction scale 1 unit per 50 >140 three times daily before meals and at bedtime (ok to correct >140 at bedtime since TPN is running overnight). Total daily Novolog: ~ 15 units/day  Qty: 15 mL, Refills: 5    Associated Diagnoses: Type 1 diabetes mellitus without complication (H); Post-pancreatectomy diabetes (H); Hypoglycemia after GI (gastrointestinal) surgery; Diabetes insipidus (H)      insulin regular 100 UNIT/ML injection Add to infusion 0.18 mLs (18 Units) daily. Add 1 syringe to TPN daily immediately prior to infusing. NOT for direct injection.  For use in TPN only.  Syringe contains 5  "units of overfill that will remain in the needle.  Qty: 64.8 mL, Refills: 0    Comments: Dosing range: 0.05- 0.2 units per gram dextrose.  For doses >0.25 units/gm dextrose, consult endocrinology.  Associated Diagnoses: Unspecified severe protein-calorie malnutrition (H)      insulin syringe-needle U-100 (30G X 1/2\" 0.3 ML) 30G X 1/2\" 0.3 ML miscellaneous Use 3 syringes daily or as directed.  Qty: 100 each, Refills: 11    Associated Diagnoses: Hypoglycemia      !! Amy Bluebox 1.5 Peptide Plain 325 mL Place 1,463 mLs into GJ tube daily. Infuse via  pump   rate varies based on tolerance  Water flush: 60ml 6x/day  Qty: 91339 mL, Refills: 8    Associated Diagnoses: Unspecified severe protein-calorie malnutrition (H); Gastroparesis      lactated ringers in 1,000 mL via CADD pump Infuse into the vein daily as needed (dehydration). Remove air via CADD pump. Infuse 1 bag(s) (1000 mL). Each bag to infuse over 2 hours. Reservoir Volume 1000 mL. Continuous rate: 500 mL/hr.  Qty: 732738 mL, Refills: 0    Associated Diagnoses: Gastroparesis      lactated ringers infusion Inject 1,000 mLs into the vein daily as needed      lidocaine (LIDODERM) 5 % patch Place onto the skin as needed. To prevent lidocaine toxicity, patient should be patch free for 12 hrs daily.      lipase-protease-amylase (CREON) 91261-03511-812612 units CPEP per EC capsule Take 3-4 capsules by mouth 3 times daily (with meals) With oral meals  Qty: 150 capsule, Refills: 11    Associated Diagnoses: Pancreatic insufficiency      meclizine (ANTIVERT) 25 MG tablet Take 25 mg by mouth 3 times daily as needed.      methocarbamol (ROBAXIN) 750 MG tablet Take 1 tablet (750 mg) by mouth 4 times daily as needed for muscle spasms  Qty: 120 tablet, Refills: 11    Associated Diagnoses: Chronic pain syndrome      miconazole (MICATIN) 2 % external powder Apply topically 2 times daily as needed for other (candidiasis).  Qty: 43 g, Refills: 0    Associated Diagnoses: Candidiasis " of skin      montelukast (SINGULAIR) 10 MG tablet Take 10 mg by mouth at bedtime.      Multiple Vitamin (INFUVITE ADULT) injection Add to infusion 10 mLs daily. Select 2 multivitamin vials, one of each color top. Draw up 5 mL from each vial and add to 1 TPN bag daily immediately prior to infusing.  Qty: 3600 mL, Refills: 0    Associated Diagnoses: Unspecified severe protein-calorie malnutrition (H)      !! Nutritional Supplements (BOOST HIGH PROTEIN) LIQD After above baseline labs are drawn, give: 6 mL/kg to maximum of 360 mL; the beverage is to be consumed within 5 minutes.  Refills: 0    Comments: If on pancreatic enzymes, patient may take home enzymes with Boost beverage.      Patients may take long acting insulin (Levemir and Lantus).      Patient should NOT cover Boost with Novolog, Humalog, Apidra, or regular insulin.  Associated Diagnoses: Pancreatic insufficiency; Post-pancreatectomy diabetes (H); Malabsorption      oxyCODONE (ROXICODONE) 5 MG/5ML solution Take 5-10 mLs (5-10 mg) by mouth or G tube every 6 hours as needed for severe pain.  Qty: 150 mL, Refills: 0    Associated Diagnoses: Acute post-operative pain      !! pantoprazole (PROTONIX) 40 mg IV push injection Inject 40 mg into the vein daily (with breakfast)  Qty: 30 each, Refills: 11    Comments: Patient gets it from her home infusion  Associated Diagnoses: Gastroparesis      !! pantoprazole (PROTONIX) injection Inject 10 mLs (40 mg) over 5 minutes into the vein via push daily. Reconstitute vial. Draw up pantoprazole 4 mg/mL in a syringe. Discard remainder of vial.  Qty: 360 each, Refills: 0    Associated Diagnoses: Gastroparesis      parenteral nutrition - TNA - see order for formula Infuse 1,690 mLs over 12 hours into the vein (central line) three times a week. TPN additives to be added prior to administration:   Infuvite-Adult 10 mL daily.   Regular Insulin 18 units daily.  Taper up for 1 Hours. Taper down for 1 Hours. Plateau rate:153.7 mL/hr.   KVO: 5 mL/hr.  Qty: 648072 mL, Refills: 0    Comments: 1) Decrease K from 40 meq to 30 meq  2) Decrease phos from 35 mmol to 25 mmol  3) Adjust Cl to 50% as able.  Associated Diagnoses: Unspecified severe protein-calorie malnutrition (H)      !! prochlorperazine (COMPAZINE) 10 MG tablet Take 1 tablet (10 mg) by mouth every 6 hours as needed for nausea or vomiting.  Qty: 20 tablet, Refills: 0    Associated Diagnoses: Nausea and vomiting, unspecified vomiting type      !! prochlorperazine (COMPAZINE) 10 MG tablet Take 1 tablet (10 mg) by mouth every 6 hours as needed for nausea or vomiting  Qty: 120 tablet, Refills: 3    Associated Diagnoses: Nausea and vomiting, unspecified vomiting type      promethazine (PHENERGAN) injection Add to infusion 1 mL (25 mg) every 8 hours as needed for nausea or vomiting. Draw up in a syringe and add to homepump immediately prior to infusing.  Discard remainder of vial.  Qty: 1095 mL, Refills: 0    Associated Diagnoses: Gastroparesis      !! Prosource TF PO LIQD (PROSOURCE TF) Place 45 mLs into GJ tube daily. Infuse via syringe  1 packet daily  Water flush: 60ml 6x/day  Qty: 1350 mL, Refills: 8    Associated Diagnoses: Unspecified severe protein-calorie malnutrition (H); Gastroparesis      rimegepant (NURTEC) 75 MG ODT tablet Place 1 tablet (75 mg) under the tongue daily as needed for migraine Maximum of 1 tablet every 24 hours.  Qty: 8 tablet, Refills: 11    Associated Diagnoses: Migraine with aura and without status migrainosus, not intractable      scopolamine (TRANSDERM) 1 MG/3DAYS 72 hr patch Place 1 patch onto the skin every 72 hours - Transdermal  Qty: 10 patch, Refills: 11    Associated Diagnoses: Slow transit constipation      Sharps Container (BD SHARPS ) MISC 1 Container as needed  Qty: 1 each, Refills: 1    Associated Diagnoses: Post-pancreatectomy diabetes (H)      silver nitrate (ARZOL SILVER NIT APPLICATORS) 75-25 % miscellaneous Apply topically as needed for  "wound care Apply Silver Nitrate Sticks every 2-3 days until granulation tissue resolved.  \"Roll\" tip of Silver Nitrate Q tip directly onto the granulation tissue-bulging tissue area.  Have Agency or Home Care Nurse administer this, if you prefer.  Take care not to touch any healthy tissue or skin.  The tissue will turn white and eventually black & scab off.  May repeat if needed in the future for recurrence.  Qty: 30 applicator, Refills: 0    Associated Diagnoses: Encounter for care related to feeding tube      sodium chloride 0.9% elastomeric pump Infuse 61 mLs into the vein every 8 hours as needed (for use with promethazine). Add 1 mL (25 mg) of promethazine 25 mg/mL to homepump, then add 5 mL NaCl 0.9% to homepump to flush port, immediately prior to infusing.  Qty: [id number] mL, Refills: 0    Associated Diagnoses: Gastroparesis      !! sodium chloride, PF, 0.9% PF flush Inject 10 mLs into the vein as needed for line flush. Flush IV before and after medication administration as directed and/or at least every 24 hours.  Qty: [id number] mL, Refills: 0    Associated Diagnoses: Unspecified severe protein-calorie malnutrition (H)      !! sodium chloride, PF, 0.9% PF flush Use 10 mLs for reconstitution daily. Add 1 syringe (10 mL) to each vial of pantoprazole 40 mg. Swirl until solution is clear.  Qty: 3600 mL, Refills: 0    Associated Diagnoses: Unspecified severe protein-calorie malnutrition (H); Gastroparesis      !! sodium chloride, PF, 0.9% PF flush Add to infusion 5 mLs every 8 hours. Add to homepump after adding promethazine to flush port.  Qty: 5400 mL, Refills: 0    Associated Diagnoses: Gastroparesis      STATIN NOT PRESCRIBED (INTENTIONAL) Previous liver issues, risks vs benefits felt to not warrant statin.    Discussed Oct 2022 visit      SYNDROS 5 MG/ML oral soln TAKE 0.5 ML BY MOUTH 2 TIMES DAILY  Qty: 60 mL, Refills: 0    Associated Diagnoses: Adult failure to thrive; Malnutrition, unspecified type (H); History " of food intolerance      traZODone (DESYREL) 50 MG tablet Take 1 tablet (50 mg) by mouth at bedtime.  Qty: 30 tablet, Refills: 1    Comments: Correcting associated diagnosis  Associated Diagnoses: Insomnia due to medical condition      zinc oxide - white petrolatum (CRITIC-AID THICK MOIST BARRIER) 20-51% PSTE topical paste Apply 71 g topically every hour as needed for rash (Under J tube bumper when needed for skin protection)  Qty: 170 g, Refills: 0    Associated Diagnoses: Epigastric pain      ZOLMitriptan (ZOMIG-ZMT) 5 MG ODT Take 1 tablet (5 mg) by mouth at onset of headache for migraine Dissolve tablet in the mouth. May repeat in 2 hours. Max 2 tablets/24 hours.  Qty: 12 tablet, Refills: 6    Associated Diagnoses: Migraine with aura and without status migrainosus, not intractable       !! - Potential duplicate medications found. Please discuss with provider.        STOP taking these medications       insulin glargine (LANTUS PEN) 100 UNIT/ML pen Comments:   Reason for Stopping:             Allergies   Allergies   Allergen Reactions    Apple Juice Anaphylaxis    Corticosteroids Other (See Comments)     All oral, IV and injectable steroids are contraindicated (unless in life threatening situations) in [facility] [employer] transplant recipients. They can cause irreversible loss of islet cell function. Please contact patient's transplant care coordinator ADI Gaffney RN at [phone]/pager [phone] and/or endocrinologist prior to administration.      Depakote [Divalproex Sodium] Other (See Comments)     Chest pain    Zithromax [Azithromycin Dihydrate] Anaphylaxis     Noted in 4/7/08 ER    Bromfenac      Other reaction(s): Headache    Codeine      Other reaction(s): Hallucinations    Darvocet [Propoxyphene N-Apap] Itching    Morphine Nausea and Vomiting and Rash    Nalbuphine Hcl Rash     RASH :nubaine    Bactrim [Sulfamethoxazole W-Trimethoprim] Other (See Comments) and Nausea and Vomiting     Severely low liver  function.    Statins Other (See Comments)     Previous liver issues, risks vs benefits felt to not warrant statin.  Discussed Oct 2022 visit    Tape [Adhesive Tape] Blisters    Tramadol     Zofran [Ondansetron] Other (See Comments)     migraine    Flagyl [Metronidazole] Hives and Rash

## 2024-11-19 NOTE — PROGRESS NOTES
"    GASTROENTEROLOGY PROGRESS NOTE    Date of Admission: 11/17/2024  Reason for Admission: PEJ fell out      ASSESSMENT:  59 year old female with a complex med/surg history including TPIAT 2016, severe gastroparesis with chronic N/V and malnutrition s/p PEG tube for venting, TF dependence via J tube, chronic intractable constipation now s/p total colectomy with end ileostomy Oct 2024. Patient presented to [facility] ED because her PEJ tube fell out.     #. Dislodged PEJ tube  #. Pain at PEJ site (resolved since tube is out)  #. TF and TPN dependent  Long discussion with patient regarding replacing the tube. She has been making great strides with her PO intake and has been able to start tapering down on her TPN as of ~3 weeks or so (TPN now 3x/week). Dietitian involved this admission and given these findings or marked oral intake improvement, she is agreeable to taking protein shakes PO. Per RD: if she is able to take 2 protein shakes per day it would make up the Kcal difference from her TFs (with her current intake of 2 cartons/evening). PEJ tube opted to not be replaced at this time.     GI notified late afternoon on 11/18 that patient developed 5/10 abdominal pain, nausea, and was vomiting \"stool\". Patient reports she had a migraine at that time. Xray reviewed which showed gastrostomy tube in place in the stomach, no obvious SBO. Visualized g-tube output which appeared to be a yellow-gelatious output with flecks of substance. Patient reports that it smelled/tasted of stool. Symptoms resolved with resolution of migraine. Remained hemodynamically stable and is asymptomatic today. Plan to trial on slow advancement of diet.      #. Gastroparesis s/p venting PEG  #. PEG malpositioned  Of note, she has also been using the PEG for venting drastically less (<10% of the time compared to 90% of the time prior to surgery). She will follow up in clinic in the future regarding potential removal of this as well.   "   Recommendations:  -- Defer replacement of PEJ at this time  -- OK for diet as tolerated, dietitian following (greatly appreciate input and communication with I team)  -- Med admin can be switched from J tube to oral/G tube  -- WOCN consult ordered re: above  -- Ideally would observe patient overnight, though if she tolerates oral intake without increasing symptoms reasonable to discharge as she has close follow up with both Dr. Genao and colorectal surgery tomorrow (11/20). If recurrence of symptoms (nausea, vomiting, abdominal pain, etc), will need to come back to [facility] ED.   -- If unable to maintain nutritional status via PO may need to consider replacing PEJ tube (could also consider trial of feeding through the PEG but likely would not tolerate if not tolerating PO). Pending clinical course.   -- Analgesia/Antiemetics per primary team    Discussed with primary team Venus Turcios PA-C.     The patient was discussed and plan agreed upon with GI staff, Dr Dodson.    GI Follow up (we will arrange - message to be sent at discharge):  -Plan for follow up with Dr. Genao & colorectal surgery on 11/20  -Patient currently set up with home infusion dietician    Overall time spent on the date of this encounter preparing to see the patient (including chart review of available notes, clinical status events, imaging and labs); obtaining and/or reviewing separately obtained history; examining the patient, coordinating and/or ordering medications, tests and/or procedures; communicating with other health care professionals; and documenting the above clinical information in the electronic medical record was 60 minutes.      Radha aNils PA-C  [facility]ancreatic[facility]  [facility]  Vocera   ______________________________________________________      Interval events since last evaluated: Nursing notes and 24hr events reviewed. NAEON, vitals stable.     Patient seen and  "examined at bedside this AM. Patient reports that she has been feeling much improved today. Denies any nausea or vomiting since yesterday. Is hungry and would like to try to eat something. Back to baseline abdominal pain around g-tube site which she reports has been ongoing for 2 years. Is having output from ileostomy. Some ongoing leakage from previous PEJ site, soaking approx. 2 gauze dressings per nursing shift.     ROS:   4 pt ROS negative unless noted in subjective.     Objective:  Blood pressure 105/63, pulse 89, temperature 98.2  F (36.8  C), temperature source Oral, resp. rate 16, height 1.727 m (5' 8\"), weight 63.5 kg (140 lb), SpO2 98%, not currently breastfeeding.  Gen: Lying in bed. Appears comfortable  HEENT: NCAT. Conjunctiva clear. Sclera anicteric   CV: RRR, Peripheral perfusion intact  Resp: No increased work of breathing  Abd: Soft, NT, ND. G tube visualized, not currently to gravity. Visualized g-tube output from yesterday which was a yellow gelatinous fluid with flecks of substance in it. No obvious stool. Bumper at 4cm, tape placed to help prevent tube migration. Ileostomy visualized, opaque bag so unable to evaluate contents. Per patient this is liquid brown stool. Old PEJ site with large gauze dressing overlying.   Msk: no gross deformity  Skin: No jaundice  Ext: warm, well perfused   Neuro: grossly normal  Mental status/Psych: A&O. Asks/answers questions appropriately        Media Information            PROCEDURES:  Procedure:             Upper GI endoscopy, 10/31/24  Indications:           Replace G-tube and J-tube. Recent colectomy for severe                          dysmotility, surgical revision of direct J tube that                          has become dislodged and painfaul. PMH of TPIAT with                          duodenojejunostomy s/p G-tube and J-tube placement,                          who is coming back for G-tube and J-tube exchange. She                          was noted to " have malposition J-tube and broken G-tube.   Providers:             TRUNG DIALLO MD, STEFAN Diaz MD:          RTUNG DIALLO MD, ALEX MARIO MD   Medicines:             General Anesthesia   Complications:         No immediate complications.   _______________________________________________________________________________   Procedure:            Pre-Anesthesia Assessment:                          - Prior to the procedure, a History and Physical was                          performed, and patient medications and allergies were                          reviewed. The patient is competent. The risks and                          benefits of the procedure and the sedation options and                          risks were discussed with the patient. All questions                          were answered and informed consent was obtained.                          Patient identification and proposed procedure were                          verified by the physician and the nurse in the                          procedure room. Mental Status Examination: alert and                          oriented. Respiratory Examination: Normal work of                          breathing. CV Examination: regular rate and rhythm.                          Prophylactic Antibiotics: The patient does not require                          prophylactic antibiotics. Prior Anticoagulants: The                          patient has taken no anticoagulant or antiplatelet                          agents. ASA Grade Assessment: III - A patient with                          severe systemic disease. After reviewing the risks and                          benefits, the patient was deemed in satisfactory                          condition to undergo the procedure. The anesthesia                          plan was to use general anesthesia. Immediately prior                          to  administration of medications, the patient was                          re-assessed for adequacy to receive sedatives. The                          heart rate, respiratory rate, oxygen saturations,                          blood pressure, adequacy of pulmonary ventilation, and                          response to care were monitored throughout the                          procedure. The physical status of the patient was                          re-assessed after the procedure.                          After obtaining informed consent, the endoscope was                          passed under direct vision. Throughout the procedure,                          the patient's blood pressure, pulse, and oxygen                          saturations were monitored continuously. The Endoscope                          was introduced through the mouth, and advanced to the                          antrum of the stomach. The upper GI endoscopy was                          accomplished without difficulty. The patient tolerated                          the procedure well.                                                                                    Findings:       The examined esophagus was normal.        A gastric tube was found in the gastric body by endoscopy. Tube cut and        removed through mouth to avoid traumatic extenral remover of bumper. New        balloon inflated 16F. Gastrostomy tube exchanged Jejunal tube        repositioned with wire        The patient was placed in the supine position for G-tube and J-tube        replacement. The G-tube tube was cut to place a 0.035 inch Glidewire        through the gastrostomy tract into the stomach. The internal bumper was        grasped with a snare and the bumper was removed along with the Glidewire        through the mouth. An 16 Fr Avanos tube was then passed under        fluoroscopic guidance into the stomach. Appropriate position of the tip        of the tube was  confirmed by fluoroscopy and contrast inection. The        external bumper was at 4 cm. The feeding tube was capped, and the tube        site cleaned and dressed.        A 0.035 inch Glidewire through the jejunostomy tube into the jejunum        under fluoroscopic guideance. Contrast was injection to confirm        antegrade advancement of the wire. A 14 Fr Avanos J-tube was then passed        under fluoroscopic guidance into the jejunum (the tube was cut 5-6 cm        short to accomodate for jejunal loops). Appropriate position of the tip        of the tube was confirmed by fluoroscopy and contrast inection. The        external bumper was at 6 cm. The tube site cleaned and dressed.                                                                                    Impression:            - Bumper gastrostomy, removed per oral by endoscopy to                          avoid tract trauma, replaced w 16F balloon retention                          short G tube                          - Direct jejunostomy had migrated outwards including                          broken balloon, held in place by single suture,                          replaced by wire guided balloon enterography to ensure                          downstream placement of 14F ballloon retention J tube                          with jejunal portion trimmed back to about 8cm to                          avoid impacting in turn in SB                          - As patient has had so much discomfort at G and J                          tube sites, tried taping bumpers flat to skin in order                          to minimize traction of balloons against the tracts   Recommendation:        - Observe patient in GI recovery unit for possible                          discharge same day.                          - Okay to use both G-tube and J-tube.     LABS:  BMP  Recent Labs   Lab 11/19/24  0656 11/19/24  0147 11/18/24  2040 11/18/24  1824 11/18/24  0810 11/18/24  0607  11/17/24 2322 11/17/24 2249   NA  --   --   --   --   --  137  --  139   POTASSIUM  --   --   --   --   --  5.2  --  4.1   CHLORIDE  --   --   --   --   --  104  --  103   KASSI  --   --   --   --   --  8.5*  --  9.2   CO2  --   --   --   --   --  22  --  25   BUN  --   --   --   --   --  15.4  --  12.1   CR  --   --   --   --   --  0.64  --  0.78   * 132* 135* 117*   < > 106*   < > 51*    < > = values in this interval not displayed.     CBC  Recent Labs   Lab 11/18/24 0607 11/17/24 2249   WBC 10.5 12.0*   RBC 3.20* 3.38*   HGB 9.1* 9.6*   HCT 29.4* 31.5*   MCV 92 93   MCH 28.4 28.4   MCHC 31.0* 30.5*   RDW 13.3 13.4   * 600*     INRNo lab results found in last 7 days.  LFTs  Recent Labs   Lab 11/17/24 2249   ALKPHOS 265*   AST 30   ALT 48   BILITOTAL 0.2   PROTTOTAL 7.1   ALBUMIN 3.8      PANC  Recent Labs   Lab 11/17/24 2249   LIPASE 6*         IMAGING:  (Personally reviewed)  ------------------------------------------------------------------  EXAM: CT ABDOMEN PELVIS W CONTRAST  LOCATION: [facility]  DATE: 11/18/2024     INDICATION: abd infection abscess  COMPARISON: 10/21/2024  TECHNIQUE: CT scan of the abdomen and pelvis was performed following injection of IV contrast. Multiplanar reformats were obtained. Dose reduction techniques were used.  CONTRAST: iopamidol (ISOVUE 370) solution 86 mL     FINDINGS:   LOWER CHEST: Dependent atelectasis in the posterior lung bases.     HEPATOBILIARY: Postcholecystectomy changes with stable pneumobilia.     PANCREAS: Pancreatitis changes with multiple clips in the pancreatic bed. The remaining pancreas is not well seen.     SPLEEN: Surgically absent. Liver extends into the left abdomen.     ADRENAL GLANDS: Normal.     KIDNEYS/BLADDER: Mild atrophy bilaterally. The bilateral kidneys enhance symmetrically without evidence for hydronephrosis or pyelonephritis. No renal masses or calculi noted. The bilateral ureters  and urinary bladder are unremarkable.     BOWEL: Percutaneous gastrostomy tube enters the distal stomach with the tip likely in the first portion of the duodenum. Percutaneous jejunostomy tube in a similar position to prior examination. Jejunostomy balloon no longer seen. Clinical correlation   for function of the tube recommended. Dilated loops of small bowel in the left upper and mid abdomen with decompressed loops of bowel in the remainder of the abdomen. No clear transition point seen. Findings appear similar to prior exam. Percutaneous   right lower quadrant ileostomy. Rectal stump  suture appears intact.     LYMPH NODES: No lymphadenopathy.     VASCULATURE: No abdominal aortic aneurysm.     PELVIC ORGANS: No pelvic masses. No pelvic free fluid. Uterus is surgically absent.     MUSCULOSKELETAL: No concerning osseous abnormalities. No inguinal or ventral hernias.                                                           IMPRESSION:   1.  Percutaneous gastrostomy tube enters the distal stomach with the tip likely in the first portion of the duodenum. Percutaneous jejunostomy tube in a similar position to prior examination. Jejunostomy balloon no longer seen. Clinical correlation for   function of the tube recommended.  2.  Dilated loops of small bowel in the left upper and mid abdomen with decompressed loops of bowel in the remainder of the abdomen. No clear transition point seen. Findings appear similar to prior exam.  3.  Percutaneous right lower quadrant ileostomy. Rectal stump suture appears intact.  ------------------------------------------------------------------  EXAMINATION:  XR ABDOMEN PORT 1 VIEW 11/18/2024      COMPARISON: CT 11/17/2024, radiographs 3/8/2024     HISTORY: Vomiting, J tube dislodged.     TECHNIQUE: Two frontal supine views of the abdomen.     FINDINGS: Percutaneous gastrostomy tube in place. No abnormally  dilated air-filled loops of bowel. The lung bases are unremarkable.  Right lower  quadrant ostomy. No jejunostomy tube is visualized                                                                      IMPRESSION:   Non-obstructive bowel gas pattern. Gastrostomy tube in place. Presence  within the stomach may be confirmed by injecting a small amount of  contrast. No jejunostomy tube is visualized.

## 2024-11-19 NOTE — PLAN OF CARE
"Goal Outcome Evaluation:    -diagnostic tests and consults completed and resulted :Met    -vital signs normal or at patient baseline: Met  /76 (BP Location: Right arm)   Pulse 111   Temp 98.4  F (36.9  C) (Oral)   Resp 16   Ht 1.727 m (5' 8\")   Wt 63.5 kg (140 lb)   SpO2 99%   BMI 21.29 kg/m      -returns to baseline functional status : Met  Pt has discharge order. Discharge    Instruction printed and Pt verbalized understanding. Awaiting for med to be filled by pharmacy.        "

## 2024-11-19 NOTE — CONSULTS
Care Management Initial Consult    General Information  Assessment completed with: Patient, Spouse or significant other, VM-chart review, Fernandez  Type of CM/SW Visit: Initial Assessment    Primary Care Provider verified and updated as needed: Yes (Juan Jose Gomez- [phone])   Readmission within the last 30 days: no previous admission in last 30 days      Reason for Consult: discharge planning  Advance Care Planning: Advance Care Planning Reviewed: no concerns identified        Communication Assessment  Patient's communication style: spoken language (English or Bilingual)    Hearing Difficulty or Deaf: no   Wear Glasses or Blind: yes    Cognitive  Cognitive/Neuro/Behavioral: WDL                      Living Environment:   People in home: spouse  Norris  Current living Arrangements: house      Able to return to prior arrangements: yes       Family/Social Support:  Care provided by: self  Provides care for: no one  Marital Status:   Support system: , Children, Friend  Norris       Description of Support System: Involved, Supportive    Support Assessment: Adequate family and caregiver support, Adequate social supports    Current Resources:   Patient receiving home care services: Yes  Skilled Home Care Services: Skilled Nursing     Community Resources: Home Infusion, home care nurse through [facility]   Equipment currently used at home: none  Supplies currently used at home: Wound Care Supplies, Enteral Nutrition & Supplies    Employment/Financial:  Employment Status: disabled        Financial Concerns: none   Referral to Financial Worker: No     Does the patient's insurance plan have a 3 day qualifying hospital stay waiver?  No    Lifestyle & Psychosocial Needs:  Social Drivers of Health     Food Insecurity: Low Risk  (11/18/2024)    Food Insecurity     Within the past 12 months, did you worry that your food would run out before you got money to buy more?: No     Within the past 12 months, did  the food you bought just not last and you didn t have money to get more?: No   Depression: Not at risk (11/6/2024)    PHQ-2     PHQ-2 Score: 2   Housing Stability: Low Risk  (11/18/2024)    Housing Stability     Do you have housing? : Yes     Are you worried about losing your housing?: No   Tobacco Use: Medium Risk (11/6/2024)    Patient History     Smoking Tobacco Use: Former     Smokeless Tobacco Use: Unknown     Passive Exposure: Not on file   Financial Resource Strain: Low Risk  (11/18/2024)    Financial Resource Strain     Within the past 12 months, have you or your family members you live with been unable to get utilities (heat, electricity) when it was really needed?: No   Alcohol Use: Not At Risk (9/28/2022)    [facility], [facility]    AUDIT-C     Frequency of Alcohol Consumption: Never     Average Number of Drinks: Not on file     Frequency of Binge Drinking: Not on file   Transportation Needs: Low Risk  (11/18/2024)    Transportation Needs     Within the past 12 months, has lack of transportation kept you from medical appointments, getting your medicines, non-medical meetings or appointments, work, or from getting things that you need?: No   Physical Activity: Sufficiently Active (3/19/2024)    Exercise Vital Sign     Days of Exercise per Week: 4 days     Minutes of Exercise per Session: 40 min   Interpersonal Safety: Low Risk  (10/31/2024)    Interpersonal Safety     Do you feel physically and emotionally safe where you currently live?: Yes     Within the past 12 months, have you been hit, slapped, kicked or otherwise physically hurt by someone?: No     Within the past 12 months, have you been humiliated or emotionally abused in other ways by your partner or ex-partner?: No   Stress: Stress Concern Present (3/19/2024)    [employer] [place] [employer] - Occupational Stress Questionnaire     Feeling of Stress : To some extent   Social Connections: Unknown (3/19/2024)    Social  Connection and Isolation Panel [NHANES]     Frequency of Communication with Friends and Family: Not on file     Frequency of Social Gatherings with Friends and Family: Once a week     Attends Ep[employer] Services: Not on file     Active Member of Clubs or Organizations: Not on file     Attends Club or Organization Meetings: Not on file     Marital Status: Not on file   Health Literacy: Not on file     Functional Status:  Prior to admission patient needed assistance:   Dependent ADLs:: Independent  Dependent IADLs:: Independent    Mental Health Status:  Mental Health Status: No Current Concerns       Chemical Dependency Status:  Chemical Dependency Status: No Current Concerns           Values/Beliefs:  Spiritual, Cultural Beliefs, Ep[employer]opalian Practices, Values that affect care:               Discussed  Partnership in Safe Discharge Planning  document with patient/family: No    Additional Information:  SW received consult for discharge planning. SW met with patient and pt's spouse Norris at bedside and introduced self, role, and purpose of assessment.  SW confirmed PCP, address, and HCD. Pt reported she lives at home with spouse, dog, and independent with ADLs and IADLs. Pt reported she has home infusion for TPN through [facility] and home care nurse from [place].  Pt shared she has wound care supplies as well.  Pt shared her spouse,3 children, and friends are support system and spouse shared he will be providing discharge transportation. SW notified [facility] that patient is discharging today and informed provider pt needs resumption of care for TPN and home care nurse.     Next Steps:  [facility] is no longer following. Please place a new consult should new needs arise while pt is on 1A Obs.  JAIDA Lau, JESSICA  OBS/ED   Phone: [phone]  Fax: [phone]    Vocera: 1A Obs [facility]

## 2024-11-19 NOTE — PLAN OF CARE
Goal Outcome Evaluation:      Plan of Care Reviewed With: patient, spouse          Outcome Evaluation: PT will discharge to home when med ready and will need resumption of care for TPN and home care nurse.

## 2024-11-19 NOTE — CONSULTS
WOC consulted for site of previous J-tube. Went to assess and pt out walking. Per MONICA Zeng the site of previous J tube is not open or draining so WOC will sign off. RN will notify MD. Thanks!

## 2024-11-19 NOTE — PLAN OF CARE
"Goal Outcome Evaluation:      Plan of Care Reviewed With: patient  Blood pressure 121/71, pulse 99, temperature 97.9  F (36.6  C), temperature source Oral, resp. rate 18, height 1.727 m (5' 8\"), weight 63.5 kg (140 lb), SpO2 100%, not currently breastfeeding.   Overall Patient Progress: improvingOverall Patient Progress: improving  -diagnostic tests and consults completed and resulted: Not met  -vital signs normal or at patient baseline:Met  -returns to baseline functional status: Not met. Patient vomited some poop from her mouth. Provider notified. X-ray was ordered.    Nurse to notify provider when observation goals have been met and patient is ready for discharge.      Managing pain with IV dilaudid and Oxy.   Patient was placed on NPO status due to nausea/vomiting. Migraine meds provided. G-tube dressing done.             "

## 2024-11-19 NOTE — PLAN OF CARE
Goal Outcome Evaluation:    Plan of Care Reviewed With: patient    Overall Patient Progress: improvingOverall Patient Progress: improving    -diagnostic tests and consults completed and resulted :Progressing    -vital signs normal or at patient baseline: Met    -returns to baseline functional status : Progressing

## 2024-11-20 ENCOUNTER — PATIENT OUTREACH (OUTPATIENT)
Dept: CARE COORDINATION | Facility: CLINIC | Age: 59
End: 2024-11-20

## 2024-11-20 ENCOUNTER — MEDICAL CORRESPONDENCE (OUTPATIENT)
Dept: HOME HEALTH SERVICES | Facility: HOME HEALTH | Age: 59
End: 2024-11-20
Payer: COMMERCIAL

## 2024-11-20 ENCOUNTER — OFFICE VISIT (OUTPATIENT)
Dept: SURGERY | Facility: CLINIC | Age: 59
End: 2024-11-20
Payer: COMMERCIAL

## 2024-11-20 VITALS
HEART RATE: 97 BPM | SYSTOLIC BLOOD PRESSURE: 82 MMHG | TEMPERATURE: 97.9 F | DIASTOLIC BLOOD PRESSURE: 54 MMHG | OXYGEN SATURATION: 96 %

## 2024-11-20 DIAGNOSIS — G89.18 ACUTE POST-OPERATIVE PAIN: ICD-10-CM

## 2024-11-20 DIAGNOSIS — Z09 FOLLOW-UP EXAMINATION AFTER COLORECTAL SURGERY: Primary | ICD-10-CM

## 2024-11-20 DIAGNOSIS — F41.9 ANXIETY: ICD-10-CM

## 2024-11-20 RX ORDER — CYCLOBENZAPRINE HCL 10 MG
5-10 TABLET ORAL EVERY 8 HOURS PRN
Qty: 21 TABLET | Refills: 0 | Status: SHIPPED | OUTPATIENT
Start: 2024-11-20

## 2024-11-20 RX ORDER — OXYCODONE HYDROCHLORIDE 5 MG/1
5-10 TABLET ORAL EVERY 6 HOURS PRN
Qty: 21 TABLET | Refills: 0 | Status: SHIPPED | OUTPATIENT
Start: 2024-11-20

## 2024-11-20 RX ORDER — HYDROXYZINE HYDROCHLORIDE 25 MG/1
25 TABLET, FILM COATED ORAL EVERY 6 HOURS PRN
Qty: 21 TABLET | Refills: 0 | Status: SHIPPED | OUTPATIENT
Start: 2024-11-20

## 2024-11-20 ASSESSMENT — PAIN SCALES - GENERAL: PAINLEVEL_OUTOF10: SEVERE PAIN (6)

## 2024-11-20 NOTE — PROVIDER NOTIFICATION
Discharge instruction reviewed.  Patient verbalized understanding.The Patient and spouse ambulated to the main lobby.  Home medications obtained from inpt pharmacy and Oxy  given to Pt and Family. Scant greenish drainage to old Gtube site. Dressing changed.Patient discharged with all belonging.

## 2024-11-20 NOTE — LETTER
2024       RE: Dinora Mcghee  [address] [facility] [address]     Dear Colleague,    Thank you for referring your patient, Dinora Mcghee, to the [facility] COLON [facility]CT[facility]IN[facility] [place] at [facility]. Please see a copy of my visit note below.    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 Intraoperatively there was an enterotomy at site of prior J tube requiring small bowel resection and replacement of J tube.     Final Diagnosis    A. OMENTUM, EXCISION:  - Fibroadipose tissue with focal acute inflammation.    B. COLON AND ILEUM, RESECTION:  - Small and large intestine with patchy serosal inflammation and fibrosis.  - Surgical margins are viable and unremarkable.  - Thirteen reactive lymph nodes (0/13).    C. JEJENUM AND J-TUBE SIDE, RESECTION:  - Segment of small intestine with contiguous squamous epithelium (ostomy site),  showing mild acute inflammation, reactive changes, mural defect (on gross examination) and serosal inflammation and fibrosis.   - Surgical margins are viable and unremarkable.  - Four reactive lymph nodes (0/4).     Interval history: Dinora had a slow postoperative recovery and was eventually discharged on 10/25 (2 weeks postop). She was ultimately discharged with TPN because she could not tolerate tube feeds (due to increasing abdominal pain and leaking around the J  tube). Both G and J tubes had to be replaced on 10/31 by Dr. Park due to dysfunction.   Her KELI tube fell out and she was just in the emergency room for 2 days.  This was not replaced.  She is still having some pain at the site.  She is now able to eat and is doing protein supplements and electrolyte drinks as well as TPN 3 days a week.  She reports that she had a fever of 104 earlier today but was under a heating blanket at the time.  She did have some associated chills.  She took Tylenol and Advil and her fever has since resolved.  She reports that she did not get IV fluids while she was in the emergency room and usually gets these on her off days from the TPN and feels that her mouth is a little dry.  No dizziness or lightheadedness.  Her ostomy is functioning well but she does tend to have loose stool overnight and she has had 2 explosions because of this.  She gets some low abdominal cramping intermittently but no nausea or vomiting.  No pouching issues.    PLEASE SEE NOTE BELOW FOR PHYSICAL EXAMINATION, REVIEW OF SYSTEMS, AND OTHER HISTORY.    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  J-tube fell out but she is been able to eat and is on TPN 3 days a week. Will check CBC and BMP given her fever, hypotension, and tachycardia.  She is able to do IV fluids at home tonight.  Would recommend trying 1 Imodium before bed for high outputs during the night.    I spent a total of 20 minutes on the day of the visit.  Time spent on date of encounter doing chart review, history and exam, documentation, and further activities in this note. This is a postoperative visit.    Kyalene Branch MD  Colon and Rectal Surgery Staff  [facility]    Medical history:  Past Medical  History:   Diagnosis Date     Allergic rhinitis, cause unspecified      Allergy to other foods     strawberries, apples, celeries, alice, watermelon     Arthritis     left knee     Choledocholithiasis     long after cholecystectomy     Chronic abdominal pain      Chronic constipation      Chronic nausea      Chronic pancreatitis (H)      Degeneration of lumbar or lumbosacral intervertebral disc      Esophageal reflux     w/ hiatal hernia     Gastroparesis      Hiatal hernia      History of pituitary adenoma     s/p resection     Hypothyroidism      Migraines      Mild hyperlipidemia      On tube feeding diet     presence of GJ tube     Pancreatic disease     PD stricture, 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 endometriosis growth     APPENDECTOMY        SECTION  1993     COLONOSCOPY  2014     COLONOSCOPY N/A 2023    Procedure: COLONOSCOPY, WITH POLYPECTOMY AND BIOPSY;  Surgeon: Percy Chamorro MD;  [facility]     ENDOSCOPIC INSERTION TUBE GASTROSTOMY N/A 2021    Procedure: Gastrojejonostomy placement with jejunopexy, PEG tube exchange;  Surgeon: Zackery Montoya MD;  [facility]     ENDOSCOPIC RETROGRADE CHOLANGIOPANCREATOGRAM N/A 2015    Procedure: ENDOSCOPIC RETROGRADE CHOLANGIOPANCREATOGRAM;  Surgeon: Mandeep Park MD;  [facility]     ENDOSCOPIC RETROGRADE CHOLANGIOPANCREATOGRAM N/A 2016    Procedure: COMBINED ENDOSCOPIC RETROGRADE CHOLANGIOPANCREATOGRAPHY, PLACE TUBE/STENT;  Surgeon: Mandeep Park MD;  [facility]     ENDOSCOPIC RETROGRADE CHOLANGIOPANCREATOGRAM N/A 2016    Procedure:  COMBINED ENDOSCOPIC RETROGRADE CHOLANGIOPANCREATOGRAPHY, REMOVE FOREIGN BODY OR STENT/TUBE;  Surgeon: Mandeep Park MD;  [facility]     ENDOSCOPIC RETROGRADE CHOLANGIOPANCREATOGRAM N/A 08/02/2016    Procedure: COMBINED ENDOSCOPIC RETROGRADE CHOLANGIOPANCREATOGRAPHY, PLACE TUBE/STENT;  Surgeon: Mandeep Park MD;  [facility]     ENDOSCOPIC RETROGRADE CHOLANGIOPANCREATOGRAM N/A 08/26/2016    Procedure: COMBINED ENDOSCOPIC RETROGRADE CHOLANGIOPANCREATOGRAPHY, REMOVE FOREIGN BODY OR STENT/TUBE;  Surgeon: Mandeep Park MD;  [facility]     ENDOSCOPIC ULTRASOUND UPPER GASTROINTESTINAL TRACT (GI) N/A 10/03/2016    Procedure: ENDOSCOPIC ULTRASOUND, ESOPHAGOSCOPY / UPPER GASTROINTESTINAL TRACT (GI);  Surgeon: Guru Jose Rodas MD;  [facility]     ENT SURGERY  1989    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     ENTEROSCOPY SMALL BOWEL N/A 02/03/2022    Procedure: ENTEROSCOPY with possible fistula closure;  Surgeon: Francisco Dodson MD;  Location:  GI     ENTEROSCOPY SMALL BOWEL N/A 9/11/2024    Procedure: Upper endoscopy, gastrostomy tube placement and jejunostomy tube exchange;  Surgeon: Zackery Montoya MD;  [facility]     ESOPHAGOSCOPY, GASTROSCOPY, DUODENOSCOPY (EGD), COMBINED N/A 06/24/2015    Procedure: COMBINED ESOPHAGOSCOPY, GASTROSCOPY, DUODENOSCOPY (EGD), REMOVE FOREIGN BODY;  Surgeon: Mandeep Park MD;  [facility]     ESOPHAGOSCOPY, GASTROSCOPY, DUODENOSCOPY (EGD), COMBINED N/A 10/25/2015    Procedure: COMBINED ESOPHAGOSCOPY, GASTROSCOPY, DUODENOSCOPY (EGD);  Surgeon: Sammy Amaro MD;  [facility]     ESOPHAGOSCOPY, GASTROSCOPY, DUODENOSCOPY (EGD), COMBINED N/A 10/25/2015    Procedure: COMBINED ESOPHAGOSCOPY, GASTROSCOPY, DUODENOSCOPY (EGD), BIOPSY SINGLE OR MULTIPLE;  Surgeon: Sammy Amaro MD;  [facility]     ESOPHAGOSCOPY, GASTROSCOPY, DUODENOSCOPY (EGD), COMBINED N/A 01/31/2023    Procedure:  ESOPHAGOGASTRODUODENOSCOPY (EGD) with peg replacement ;  Surgeon: Mandeep Park MD;  [facility]     ESOPHAGOSCOPY, GASTROSCOPY, DUODENOSCOPY (EGD), COMBINED N/A 7/24/2024    Procedure: Esophagoscopy, gastroscopy, duodenoscopy (EGD), combined;  Surgeon: Zackery Montoya MD;  [facility]     ESOPHAGOSCOPY, GASTROSCOPY, DUODENOSCOPY (EGD), COMBINED N/A 10/31/2024    Procedure: Esophagoscopy, gastroscopy, duodenoscopy (EGD), combined;  Surgeon: Mandeep Park MD;  [facility]     ESOPHAGOSCOPY, GASTROSCOPY, DUODENOSCOPY (EGD), DILATATION, COMBINED       EXCISE LESION TRUNK N/A 04/17/2017    Procedure: EXCISE LESION TRUNK;  Removal of Abdominal Foreign Body;  Surgeon: Nestor Phoenix MD;  [facility]     HC ESOPH/GAS REFLUX TEST W NASAL IMPED >1 HR N/A 11/19/2015    Procedure: ESOPHAGEAL IMPEDENCE FUNCTION TEST WITH 24 HOUR PH GREATER THAN 1 HOUR;  Surgeon: Thiago Apple MD;  [facility]     HC [facility] ENDOSCOPY DIAG W BIOPSY  09/17/2008     HC U[facility] ENDOSCOPY DIAG W BIOPSY  09/27/2012     HC UGI ENDOSCOPY W ESOPHAGEAL DILATION BALLOON <30MM  09/17/2008     HC UGI ENDOSCOPY W EUS N/A 05/05/2015    Procedure: COMBINED ENDOSCOPIC ULTRASOUND, ESOPHAGOSCOPY, GASTROSCOPY, DUODENOSCOPY (EGD);  Surgeon: Wm Dueñas MD;  [facility]     HC WRIST ARTHROSCOP,RELEASE XVERS LIG Bilateral 12/17/2008     INJECT TRANSVERSUS ABDOMINIS PLANE (TAP) BLOCK BILATERAL Left 09/22/2016    Procedure: INJECT TRANSVERSUS ABDOMINIS PLANE (TAP) BLOCK BILATERAL;  Surgeon: Dickson Corrigan MD;  [facility]     INJECT TRIGGER POINT Bilateral 09/08/2022    Procedure: Abdominal trigger point injection with ultrasound;  Surgeon: Monika Mahajan MD;  [facility]     INJECT TRIGGER POINT SINGLE / MULTIPLE 3 OR MORE MUSCLES Right 11/15/2022    Procedure: Trigger point injections right abdomen with ultrasound guidance;  Surgeon: Monika Mahajan MD;  [facility]     IR CHEST PORT  PLACEMENT < 5 YRS OF AGE  06/10/2022     IR CVC TUNNEL PLACEMENT > 5 YRS OF AGE  4/15/2024     IR PORT REMOVAL RIGHT  11/09/2023     LAPAROSCOPIC ASSISTED COLECTOMY N/A 10/11/2024    Procedure: Exploratory laparotomy, extensive lysis of adhesions, revision of Jtube and small bowel resection, TOTAL ABDOMINAL COLECTOMY WITH ILEORECTAL ANASTAMOSIS, DIVERTING LOOP ILEOSTOMY, flexible sigmoidoscopy;  Surgeon: Kaylene Branch MD;  [facility]     laparoscopic pineda  01/01/1995     LAPAROSCOPIC HERNIORRHAPHY INCISIONAL N/A 08/23/2018    Procedure: LAPAROSCOPIC HERNIORRHAPHY INCISIONAL;  Laparoscopic Incisional Hernia Repair with Symbotex Mesh Implant;  Surgeon: Nestor Phoenix MD;  [facility]     LAPAROSCOPIC PANCREATECTOMY, TRANSPLANT AUTO ISLET CELL N/A 12/28/2016    Procedure: LAPAROSCOPIC PANCREATECTOMY, TRANSPLANT AUTO ISLET CELL;  Surgeon: Nestor Phoenix MD;  [facility]     LAPAROTOMY EXPLORATORY N/A 01/31/2023    Procedure: Exploratory Laparotomy, lysis of adhesions, Perforated J-Junostomy Resection, Replace J-Junostomy site;  Surgeon: Elver Bauer MD;  [facility]     LAPAROTOMY, LYSIS ADHESIONS, COMBINED N/A 01/31/2023    Procedure: Dilatation of jejunostomy feeding tube, track with placement of jejunostomy tube with fluoroscopy;  Surgeon: Elver Bauer MD;  [facility]     PICC DOUBLE LUMEN PLACEMENT Left 11/13/2023    Basilic Vein 5F DL 43 cm     REMOVE AND REPLACE BREAST IMPLANT PROSTHESIS N/A 12/30/2022    Procedure: Exploratory Laparotomy, lysis of adhesions, small bowel resection. Placement of gastric jejunostomy for enteral feeding.;  Surgeon: Elver Bauer MD;  [facility]     REMOVE GASTROSTOMY TUBE ADULT N/A 01/28/2022    Procedure: REMOVAL, GASTROSTOMY TUBE, ADULT;  Surgeon: Mandeep Park MD;  [facility]     REPLACE GASTROJEJUNOSTOMY TUBE, PERCUTANEOUS N/A 09/07/2021    Procedure: GASTROJEJUNOSTOMY TUBE PLACEMENT,  PERCUTANEOUS, WITH GASTROPEXY;  Surgeon: Mandeep Park MD;  [facility]     REPLACE GASTROJEJUNOSTOMY TUBE, PERCUTANEOUS N/A 09/22/2021    Procedure: REPLACEMENT, GASTROJEJUNOSTOMY TUBE, PERCUTANEOUS;  Surgeon: Zackery Montoya MD;  [facility]     REPLACE GASTROJEJUNOSTOMY TUBE, PERCUTANEOUS N/A 11/22/2022    Procedure: REPLACEMENT, GASTROJEJUNOSTOMY TUBE, PERCUTANEOUS;  Surgeon: Mandeep Park MD;  [facility]     REPLACE GASTROJEJUNOSTOMY TUBE, PERCUTANEOUS N/A 12/09/2022    Procedure: REPLACEMENT, GASTROJEJUNOSTOMY TUBE, PERCUTANEOUS with ENDOSCOPIC SUTURING.;  Surgeon: Mandeep Park MD;  [facility]     REPLACE GASTROJEJUNOSTOMY TUBE, PERCUTANEOUS N/A 08/01/2023    Procedure: Replace Gastrojejunostomy Tube, Percutaneous;  Surgeon: Mandeep Park MD;  [facility]     REPLACE GASTROJEJUNOSTOMY TUBE, PERCUTANEOUS N/A 11/11/2023    Procedure: Replace Gastrojejunostomy Tube, Percutaneous;  Surgeon: Francisco Dodson MD;  [facility]     REPLACE GASTROJEJUNOSTOMY TUBE, PERCUTANEOUS N/A 12/8/2023    Procedure: Replace Gastrojejunostomy Tube, Percutaneous;  Surgeon: Mandeep Park MD;  [facility]     REPLACE GASTROSTOMY TUBE, PERCUTANEOUS N/A 10/31/2024    Procedure: REPLACEMENT, GASTROSTOMY TUBE, PERCUTANEOUS;  Surgeon: Mandeep Park MD;  [facility]     REPLACE JEJUNOSTOMY TUBE, PERCUTANEOUS N/A 09/10/2021    Procedure: UPPER ENDOSCOPY, REPLACEMENT OF PERCUTANEOUS GASTROJEJUNOSTOMY TUBE, T-TAG GASTROPEXY;  Surgeon: Zackery Montoya MD;  [facility]     REPLACE JEJUNOSTOMY TUBE, PERCUTANEOUS N/A 12/29/2021    Procedure: REPLACEMENT, JEJUNOSTOMY TUBE, PERCUTANEOUS;  Surgeon: Mandeep Park MD;  [facility]     REPLACE JEJUNOSTOMY TUBE, PERCUTANEOUS N/A 05/04/2023    Procedure: REPLACEMENT, JEJUNOSTOMY TUBE, PERCUTANEOUS;  Surgeon: Mandeep Pakr MD;  [facility]     REPLACE JEJUNOSTOMY TUBE,  PERCUTANEOUS  2023    Procedure: Replace Jejunostomy Tube, Percutaneous;  Surgeon: Mandeep Park MD;  [facility]     REPLACE JEJUNOSTOMY TUBE, PERCUTANEOUS N/A 2024    Procedure: REPLACEMENT, JEJUNOSTOMY TUBE, PERCUTANEOUS;  Surgeon: Francisco Dodson MD;  [facility]     REPLACE JEJUNOSTOMY TUBE, PERCUTANEOUS N/A 10/31/2024    Procedure: REPLACEMENT, JEJUNOSTOMY TUBE, PERCUTANEOUS;  Surgeon: Mandeep Park MD;  [facility]     transphenoidal pituitary resection  1990     [facility]  DELIVERY ONLY  1996     [facility]  DELIVERY ONLY  1998    repeat c section with incidental cystotomy with repair     [facility] EXCIS PITUITARY,TRANSNASAL/SEPTAL  1980    pituitary tumor removed for diabetes insipidus     [facility] TOTAL ABDOM HYSTERECTOMY  1999    w/ bilateral salpingoophorectomy        Problem list:    Patient Active Problem List    Diagnosis Date Noted     Dislodged jejunostomy tube 2024     Priority: Medium     Constipation 2024     Priority: Medium     Right upper quadrant abdominal pain 2024     Priority: Medium     S/P pancreatic islet cell transplantation (H) 2024     Priority: Medium     Bacteremia 11/10/2023     Priority: Medium     Abdominal pain, unspecified abdominal location 2023     Priority: Medium     Cholangitis (H) 2023     Priority: Medium     On total parenteral nutrition (TPN) 2023     Priority: Medium     Fever, unspecified fever cause 2023     Priority: Medium     Chronic pancreatitis, unspecified pancreatitis type (H) 2023     Priority: Medium     History of food intolerance 2023     Priority: Medium     Malnutrition, unspecified type (H) 2023     Priority: Medium     Nausea and vomiting, unspecified vomiting type 2023     Priority: Medium     Major depression, single episode 2023     Priority: Medium     Acute postoperative abdominal pain 2023      Priority: Medium     Feeding intolerance 12/30/2022     Priority: Medium     Myofascial pain 10/14/2022     Priority: Medium     Added automatically from request for surgery [id number]       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  pancreas and spleen removed, islet cells transplanted into liver    3 classes of vaccines needed .    1. Pneumococcal vaccines are as follows:       PCV 13 - one time dose (was given 2017)       PPSV23 - (given 12/1/16); repeat q 5 years      ROLE of PCV 20???    2. Meningococcal vaccines     Menactra - (last given 12/1/16) -  repeat q 5 yrs   NOTE: need to wait 4 wks after PCV 13 has been given.   OR - give Menveo instead as it can be given same time as PCV 13    AND   Bexcero - (got 12/9/16) - does not need to be repeated.     3. The Hib vaccine - (got 12/9/16) - does not need to be repeated.       Poisoning by drug 05/09/2022     Priority: Medium     Formatting of this note might be different from the original.  Per Care Everywhere review:  All oral, IV and injectable steroids are contraindicated (unless in life threatening situations) in Islet Auto transplant recipients. They can cause irreversible loss of islet cell function. Please contact patient's transplant care coordinator ADI Gaffney RN at [phone]/pager [phone] and/or endocrinologist prior to administration.       Type 1 diabetes mellitus without complication (H) 05/09/2022     Priority: Medium     Formatting of this note might be different from the original.  ACTUALLY - DM 3c - pathogenic diabetes as no pancreas.  (pancreas and spleen removed, islet cells transplanted into liver)    Seeing ENDO at [facility] - Dr Erum Melissa       Intra-abdominal abscess (H) 12/03/2021     Priority: Medium     Postprocedural intraabdominal abscess (H) 12/03/2021     Priority: Medium     Gastrostomy tube obstruction (H) 11/27/2021     Priority: Medium      Abdominal pain, generalized 09/18/2021     Priority: Medium     Adult failure to thrive 08/16/2021     Priority: Medium     Added automatically from request for surgery [id number]       Hypoglycemia 08/05/2021     Priority: Medium     Iron deficiency anemia 03/22/2019     Priority: Medium     Headache, chronic migraine without aura 09/18/2018     Priority: Medium     Chronic pain syndrome 09/18/2018     Priority: Medium     S/P hernia repair 08/23/2018     Priority: Medium     Incisional hernia, without obstruction or gangrene 05/20/2018     Priority: Medium     Adhesive capsulitis of shoulder, unspecified laterality 11/14/2017     Priority: Medium     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  Excess IgG4  [facility]       Gastroparesis      Priority: Medium     ACP (advance care planning) 03/28/2017     Priority: Medium     Advance Care Planning 3/28/2017: Receipt of ACP document:  Received: Health Care Directive which was witnessed or notarized on 12/8/16.  Document previously scanned on 1/12/17.  Validation form completed and scanned.  Code Status reflects choices in most recent ACP document..  Confirmed/documented designated decision maker(s).  Added by May Baires   Advance Care Planning Liaison               Pancreatic insufficiency 01/17/2017     Priority: Medium     Diabetes insipidus (H) 01/05/2017     Priority: Medium     Formatting of this note might be different from the original.  Diabetes Insipidus (DI)  Per external records.  H/o pituitary gland tumor in 20s       Post-pancreatectomy diabetes (H) 12/28/2016     Priority: Medium     Sphincter of Oddi dysfunction 01/18/2016     Priority: Medium     Abdominal pain 06/02/2015     Priority: Medium     S/P ERCP 06/02/2015     Priority: Medium     History of ERCP  04/20/2015     Priority: Medium     Depressive disorder 11/25/2014     Priority: Medium     Formatting of this note might be different from the original.  Depression NOS       Other type of intractable migraine      Priority: Medium     Diagnosis updated by automated process. Provider to review and confirm.       GERD (gastroesophageal reflux disease) 12/01/2010     Priority: Medium     Lumbago 04/18/2005     Priority: Medium     History of L5-S1 degenerative disk disease.         Sensorineural hearing loss 01/10/2005     Priority: Medium     Problem list name updated by automated process. Provider to review       Vertiginous syndrome and labyrinthine disorder 01/10/2005     Priority: Medium     Problem list name updated by automated process. Provider to review  IMO Regulatory Load OCT 2020       Sprain and strain of other specified sites of hip and thigh 12/09/2002     Priority: Medium     Chondromalacia of patella 12/09/2002     Priority: Medium     Need for prophylactic immunotherapy      Priority: Medium     trees, grass, srw, dust mites, cat       Allergic rhinitis due to other allergen 12/21/2001     Priority: Medium     Hypothyroidism      Priority: Medium     Problem list name updated by automated process. Provider to review         Medications:  Current Outpatient Medications   Medication Sig Dispense Refill     acetaminophen (TYLENOL) 325 MG/10.15ML solution 20.3 mLs (650 mg) by Per J Tube route every 6 hours as needed for mild pain or fever 473 mL 4     baclofen (LIORESAL) 10 MG tablet Take 1-2 tablets (10-20 mg) by mouth 3 times daily.       cetirizine (ZYRTEC) 10 MG tablet Take 1 tablet (10 mg) by mouth every morning.       COMPOUNDED NON-CONTROLLED SUBSTANCE (CMPD RX) - PHARMACY TO MIX COMPOUNDED MEDICATION Administer 60 mg amitriptyline 2 mg/ml via G-J tube at bedtime (Patient taking differently: Administer 40 mg amitriptyline 2 mg/ml via G-J tube at bedtime) 900 mL 3     COMPOUNDED NON-CONTROLLED  "SUBSTANCE (CMPD RX) - PHARMACY TO MIX COMPOUNDED MEDICATION Administer 40 mg amitriptyline suspension (2 mg/mL) via G-J tube at bedtime. 600 mL 5     Continuous Blood Gluc Sensor (FREESTYLE LISA 3 SENSOR) MISC CHANGE EVERY 14 DAYS 2 each 11     Continuous Glucose Sensor (FREESTYLE LISA 3 SENSOR) MISC Change every 14 days. 6 each 1     cyclobenzaprine (FLEXERIL) 10 MG tablet Take 0.5-1 tablets (5-10 mg) by mouth every 8 hours as needed for muscle spasms. 60 tablet 4     diphenhydrAMINE (BENADRYL) 12.5 MG/5ML solution Take 25 mg by mouth 4 times daily as needed for other (nausea)       DULoxetine HCl 60 MG CSDR Take 1 capsule (60 mg) by mouth daily. Sprinkle caps for feeding tube       Emergency Supply Kit, Central, Patient use for emergency only. Contents: 3 sodium chloride 0.9% flushes, 1 dressing kit, 1 microclave ext set 14\", 4 nitrile gloves (med), 6 alcohol prep pads, 1 bacitracin, 1 syringe (10 cc 20 G 1\"). Call [phone] to reorder. [id number] kit 0     estradiol (VAGIFEM) 10 MCG TABS vaginal tablet INSERT 1 TABLET(10 MCG) VAGINALLY 2 TIMES A WEEK 24 tablet 2     famotidine (PEPCID) 40 MG/5ML suspension Place 2.5 mLs (20 mg) into J tube every evening. 100 mL 1     glucagon 1 MG kit Give 0.1 to 0.15mg( 10-15 units on Insulin sryinge) subcutaneous  every 15 minutes PRN for hypoglycemia. Remix new kit q24hr. Needs up to 3 kit/week. 10 each 3     glucose 40 % GEL gel Take 15-30 g by mouth every 15 minutes as needed for low blood sugar 3 Tube 2     hydrOXYzine HCl (ATARAX) 25 MG tablet Take 1 tablet (25 mg) by mouth every 6 hours as needed for anxiety or other (adjuvant pain). 60 tablet 3     ibuprofen (ADVIL/MOTRIN) 400 MG tablet take 30 mls  by oral route  every 4 - 6 hours as needed       insulin aspart (NOVOLOG FLEXPEN) 100 UNIT/ML pen Take 2 units with meals OR use insulin carbohydrate ratio 1 unit per 15 grams of carbohydrates three time daily with meals/snacks plus correction scale 1 unit per 50 >140 " "three times daily before meals and at bedtime (ok to correct >140 at bedtime since TPN is running overnight). Total daily Novolog: ~ 15 units/day 15 mL 5     insulin regular 100 UNIT/ML injection Add to infusion 0.18 mLs (18 Units) daily. Add 1 syringe to TPN daily immediately prior to infusing. NOT for direct injection.  For use in TPN only.  Syringe contains 5 units of overfill that will remain in the needle. 64.8 mL 0     insulin syringe-needle U-100 (30G X 1/2\" 0.3 ML) 30G X 1/2\" 0.3 ML miscellaneous Use 3 syringes daily or as directed. 100 each 11     lactated ringers in 1,000 mL via CADD pump Infuse into the vein daily as needed (dehydration). Remove air via CADD pump. Infuse 1 bag(s) (1000 mL). Each bag to infuse over 2 hours. Reservoir Volume 1000 mL. Continuous rate: 500 mL/hr. [id number] mL 0     lactated ringers infusion Inject 1,000 mLs into the vein daily as needed       levothyroxine (SYNTHROID) 25 mcg/mL SUSP Take 7 mLs (175 mcg) by mouth daily.       lidocaine (LIDODERM) 5 % patch Place onto the skin as needed. To prevent lidocaine toxicity, patient should be patch free for 12 hrs daily.       lipase-protease-amylase (CREON) [id number] units CPEP per EC capsule Take 3-4 capsules by mouth 3 times daily (with meals) With oral meals 150 capsule 11     meclizine (ANTIVERT) 25 MG tablet Take 25 mg by mouth 3 times daily as needed.       methocarbamol (ROBAXIN) 750 MG tablet Take 1 tablet (750 mg) by mouth 4 times daily as needed for muscle spasms 120 tablet 11     miconazole (MICATIN) 2 % external powder Apply topically 2 times daily as needed for other (candidiasis). 43 g 0     montelukast (SINGULAIR) 10 MG tablet Take 10 mg by mouth at bedtime.       Multiple Vitamin (INFUVITE ADULT) injection Add to infusion 10 mLs daily. Select 2 multivitamin vials, one of each color top. Draw up 5 mL from each vial and add to 1 TPN bag daily immediately prior to infusing. 3600 mL 0     Nutritional Supplements " (BOOST HIGH PROTEIN) LIQD After above baseline labs are drawn, give: 6 mL/kg to maximum of 360 mL; the beverage is to be consumed within 5 minutes.  0     oxyCODONE (ROXICODONE) 5 MG tablet Take 1-2 tablets (5-10 mg) by mouth every 6 hours as needed for pain. 8 tablet 0     oxyCODONE (ROXICODONE) 5 MG/5ML solution Take 5-10 mLs (5-10 mg) by mouth or G tube every 6 hours as needed for severe pain. 150 mL 0     pantoprazole (PROTONIX) 40 mg IV push injection Inject 40 mg into the vein daily (with breakfast) 30 each 11     pantoprazole (PROTONIX) injection Inject 10 mLs (40 mg) over 5 minutes into the vein via push daily. Reconstitute vial. Draw up pantoprazole 4 mg/mL in a syringe. Discard remainder of vial. 360 each 0     parenteral nutrition - TNA - see order for formula Infuse 1,690 mLs over 12 hours into the vein (central line) three times a week. TPN additives to be added prior to administration:   Infuvite-Adult 10 mL daily.   Regular Insulin 18 units daily.  Taper up for 1 Hours. Taper down for 1 Hours. Plateau rate:153.7 mL/hr.  KVO: 5 mL/hr. 296800 mL 0     prochlorperazine (COMPAZINE) 10 MG tablet Take 1 tablet (10 mg) by mouth every 6 hours as needed for nausea or vomiting. 20 tablet 0     prochlorperazine (COMPAZINE) 10 MG tablet Take 1 tablet (10 mg) by mouth every 6 hours as needed for nausea or vomiting 120 tablet 3     promethazine (PHENERGAN) injection Add to infusion 1 mL (25 mg) every 8 hours as needed for nausea or vomiting. Draw up in a syringe and add to homepump immediately prior to infusing.  Discard remainder of vial. 1095 mL 0     Prosource TF PO LIQD (PROSOURCE TF) Place 45 mLs into GJ tube daily. Infuse via syringe  1 packet daily  Water flush: 60ml 6x/day 1350 mL 8     rimegepant (NURTEC) 75 MG ODT tablet Place 1 tablet (75 mg) under the tongue daily as needed for migraine Maximum of 1 tablet every 24 hours. 8 tablet 11     scopolamine (TRANSDERM) 1 MG/3DAYS 72 hr patch Place 1 patch onto  "the skin every 72 hours - Transdermal 10 patch 11     Sharps Container (BD SHARPS ) MISC 1 Container as needed 1 each 1     silver nitrate (ARZOL SILVER NIT APPLICATORS) 75-25 % miscellaneous Apply topically as needed for wound care Apply Silver Nitrate Sticks every 2-3 days until granulation tissue resolved.  \"Roll\" tip of Silver Nitrate Q tip directly onto the granulation tissue-bulging tissue area.  Have Agency or Home Care Nurse administer this, if you prefer.  Take care not to touch any healthy tissue or skin.  The tissue will turn white and eventually black & scab off.  May repeat if needed in the future for recurrence. 30 applicator 0     sodium chloride 0.9% elastomeric pump Infuse 61 mLs into the vein every 8 hours as needed (for use with promethazine). Add 1 mL (25 mg) of promethazine 25 mg/mL to homepump, then add 5 mL NaCl 0.9% to homepump to flush port, immediately prior to infusing. 961847 mL 0     sodium chloride, PF, 0.9% PF flush Inject 10 mLs into the vein as needed for line flush. Flush IV before and after medication administration as directed and/or at least every 24 hours. 951376 mL 0     sodium chloride, PF, 0.9% PF flush Use 10 mLs for reconstitution daily. Add 1 syringe (10 mL) to each vial of pantoprazole 40 mg. Swirl until solution is clear. 3600 mL 0     sodium chloride, PF, 0.9% PF flush Add to infusion 5 mLs every 8 hours. Add to homepump after adding promethazine to flush port. 5400 mL 0     STATIN NOT PRESCRIBED (INTENTIONAL) Previous liver issues, risks vs benefits felt to not warrant statin.    Discussed Oct 2022 visit       SYNDROS 5 MG/ML oral soln TAKE 0.5 ML BY MOUTH 2 TIMES DAILY 60 mL 0     traZODone (DESYREL) 50 MG tablet Take 1 tablet (50 mg) by mouth at bedtime. 30 tablet 1     zinc oxide - white petrolatum (CRITIC-AID THICK MOIST BARRIER) 20-51% PSTE topical paste Apply 71 g topically every hour as needed for rash (Under J tube bumper when needed for skin " protection) 170 g 0     ZOLMitriptan (ZOMIG-ZMT) 5 MG ODT Take 1 tablet (5 mg) by mouth at onset of headache for migraine Dissolve tablet in the mouth. May repeat in 2 hours. Max 2 tablets/24 hours. 12 tablet 6       Allergies:  Allergies   Allergen Reactions     Apple Juice Anaphylaxis     Corticosteroids Other (See Comments)     All oral, IV and injectable steroids are contraindicated (unless in life threatening situations) in [facility] transplant recipients. They can cause irreversible loss of islet cell function. Please contact patient's transplant care coordinator ADI Gaffney RN at [phone]/pager [phone] and/or endocrinologist prior to administration.       Depakote [Divalproex Sodium] Other (See Comments)     Chest pain     Zithromax [Azithromycin Dihydrate] Anaphylaxis     Noted in 4/7/08 ER     Bromfenac      Other reaction(s): Headache     Codeine      Other reaction(s): Hallucinations     Darvocet [Propoxyphene N-Apap] Itching     Morphine Nausea and Vomiting and Rash     Nalbuphine Hcl Rash     RASH :nubaine     Bactrim [Sulfamethoxazole W-Trimethoprim] Other (See Comments) and Nausea and Vomiting     Severely low liver function.     Statins Other (See Comments)     Previous liver issues, risks vs benefits felt to not warrant statin.  Discussed Oct 2022 visit     Tape [Adhesive Tape] Blisters     Tramadol      Zofran [Ondansetron] Other (See Comments)     migraine     Flagyl [Metronidazole] Hives and Rash       Family history:  Family History   Problem Relation Age of Onset     Lipids Mother      Hypertension Mother      Thyroid Disease Mother      Depression Mother      Angina Mother      GERD Mother      Skin Cancer Mother      Migraines Mother      Autoimmune Disease Mother      Hyperlipidemia Mother      Mental Illness Mother      Cerebrovascular Disease Mother         11/2023     Other Cancer Mother         skin-basil cell     Anxiety Disorder Mother      Post Operative Nausea and  Vomiting Mother      Eye Disorder Father         cataract, detached retina     Myocardial Infarction Father 60     Lipids Father      Cerebrovascular Disease Father      Depression Father      Substance Abuse Father      Anesthesia Reaction Father         stroke right after surgery     Cataracts Father      Osteoarthritis Father      Ulcerative Colitis Father      Autoimmune Disease Father      Heart Disease Father      Hyperlipidemia Father      Mental Illness Father      Other Cancer Father         myloma     Anxiety Disorder Father      Interpersonal Violence Sister      Coronary Artery Disease Sister         angioplasty     GERD Sister      Substance Abuse Sister      Cerebrovascular Disease Sister         2005     Depression Sister      Thyroid Disease Sister      Autoimmune Disease Sister      Depression Sister      Mental Illness Sister      Substance Abuse Sister      Thyroid Disease Sister      Eye Disorder Maternal Grandmother         cataract     Thyroid Disease Maternal Grandmother      Diabetes Maternal Grandfather      Eye Disorder Paternal Grandmother         cataract     Diabetes Paternal Grandmother      Eye Disorder Paternal Grandfather         cataract     Diabetes Paternal Grandfather      Substance Abuse Paternal Grandfather      Eye Disorder Son         ptosis     Depression Son      Anxiety Disorder Son      Depression Son      Mental Illness Son      Anxiety Disorder Son      Heart Disease Paternal Aunt      Diabetes Paternal Aunt      Diabetes Paternal Uncle      Heart Disease Paternal Uncle      Depression Nephew      Anxiety Disorder Nephew      Thyroid Disease Nephew      Thyroid Disease Nephew      Anxiety Disorder Nephew      Diabetes Type 2  Cousin         paternal cousin     Autoimmune Disease Cousin        Social history:  Social History     Socioeconomic History     Marital status:      Spouse name: Norris     Number of children: 3     Years of education: 16     Highest  education level: Not on file   Occupational History     Occupation: director     Employer: [employer]   Tobacco Use     Smoking status: Former     Current packs/day: 0.00     Average packs/day: 0.5 packs/day for 6.3 years (3.2 ttl pk-yrs)     Types: Cigarettes     Start date: 1985     Quit date: 1992     Years since quittin.9     Smokeless tobacco: Not on file     Tobacco comments:     no 2nd hand   Vaping Use     Vaping status: Some Days     Substances: THC, CBD   Substance and Sexual Activity     Alcohol use: Not Currently     Alcohol/week: 3.0 - 6.0 standard drinks of alcohol     Types: 1 - 2 Glasses of wine, 1 - 2 Cans of beer, 1 - 2 Shots of liquor per week     Comment: none since IGG     Drug use: Yes     Comment: medical canabis tincture and vape     Sexual activity: Yes     Partners: Male     Birth control/protection: Male Surgical, Female Surgical, None     Comment: Hystectomy    Other Topics Concern     Parent/sibling w/ CABG, MI or angioplasty before 65F 55M? No      Service Not Asked     Blood Transfusions No     Caffeine Concern Not Asked     Occupational Exposure Not Asked     Hobby Hazards Not Asked     Sleep Concern Not Asked     Stress Concern Not Asked     Weight Concern Not Asked     Special Diet Not Asked     Back Care Not Asked     Exercise Not Asked     Bike Helmet Not Asked     Seat Belt Yes     Self-Exams Not Asked   Social History Narrative     with 3 children and a dog.  No smoking, etoh or drug use.  Worked as a  for [employer] in [place] in the past.  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     Social Drivers of Health     Financial Resource Strain: Low Risk  (2024)    Financial Resource Strain      Within the past 12 months, have you or your family members you live with been unable to get utilities (heat, electricity) when it was really needed?: No   Food Insecurity: Low Risk  (2024)    Food  Insecurity      Within the past 12 months, did you worry that your food would run out before you got money to buy more?: No      Within the past 12 months, did the food you bought just not last and you didn t have money to get more?: No   Transportation Needs: Low Risk  (11/18/2024)    Transportation Needs      Within the past 12 months, has lack of transportation kept you from medical appointments, getting your medicines, non-medical meetings or appointments, work, or from getting things that you need?: No   Physical Activity: Sufficiently Active (3/19/2024)    Exercise Vital Sign      Days of Exercise per Week: 4 days      Minutes of Exercise per Session: 40 min   Stress: Stress Concern Present (3/19/2024)    [place] [employer] - Occupational Stress Questionnaire      Feeling of Stress : To some extent   Social Connections: Unknown (3/19/2024)    Social Connection and Isolation Panel [NHANES]      Frequency of Communication with Friends and Family: Not on file      Frequency of Social Gatherings with Friends and Family: Once a week      Attends Religion Services: Not on file      Active Member of Clubs or Organizations: Not on file      Attends Club or Organization Meetings: Not on file      Marital Status: Not on file   Interpersonal Safety: Low Risk  (10/31/2024)    Interpersonal Safety      Do you feel physically and emotionally safe where you currently live?: Yes      Within the past 12 months, have you been hit, slapped, kicked or otherwise physically hurt by someone?: No      Within the past 12 months, have you been humiliated or emotionally abused in other ways by your partner or ex-partner?: No   Housing Stability: Low Risk  (11/18/2024)    Housing Stability      Do you have housing? : Yes      Are you worried about losing your housing?: No       Physical Examination:  BP (!) 83/55 (BP Location: Left arm, Patient Position: Sitting, Cuff Size: Adult Regular)   Pulse 114   SpO2 98%    General: alert, oriented, in no acute distress, sitting comfortably  Respiratory: non-labored breathing on RA  Abdomen: soft, non-distended, incision well healed, G tube in place. J tube site macerated and zinc barrier cream and Mepilex applied. Loop ileostomy on right side with liquid stool in bag and pouch with good seal.      Again, thank you for allowing me to participate in the care of your patient.      Sincerely,    Kaylene Branch MD

## 2024-11-20 NOTE — NURSING NOTE
Chief Complaint   Patient presents with    Post-op Visit       Vitals:    11/20/24 1751   BP: (!) 83/55   BP Location: Left arm   Patient Position: Sitting   Cuff Size: Adult Regular   Pulse: 114   SpO2: 98%       There is no height or weight on file to calculate BMI.    Herbie Kasper EMT-P

## 2024-11-20 NOTE — PROGRESS NOTES
Clinic Care Coordination Contact  Care Coordination Clinician Chart Review    Situation: Patient chart reviewed by care coordinator.    Assessment: Pt has had a follow up APPT with [facility] today 11/20 after colorectal surgery and does not need any further outreaches.    Stephanie ANDERS, Community Health Worker  Clinic Care Coordination  [facility]   Clinic #: [phone]  Direct #: [phone]

## 2024-11-21 ENCOUNTER — TRANSFERRED RECORDS (OUTPATIENT)
Dept: HEALTH INFORMATION MANAGEMENT | Facility: CLINIC | Age: 59
End: 2024-11-21
Payer: COMMERCIAL

## 2024-11-21 PROCEDURE — 99601 HOME NFS VISIT <2 HRS: CPT

## 2024-11-22 ENCOUNTER — HOME INFUSION BILLING (OUTPATIENT)
Dept: HOME HEALTH SERVICES | Facility: HOME HEALTH | Age: 59
End: 2024-11-22
Payer: COMMERCIAL

## 2024-11-22 PROCEDURE — A6403 STERILE GAUZE>16 <= 48 SQ IN: HCPCS

## 2024-11-25 ENCOUNTER — TRANSFERRED RECORDS (OUTPATIENT)
Dept: HEALTH INFORMATION MANAGEMENT | Facility: CLINIC | Age: 59
End: 2024-11-25

## 2024-11-25 ENCOUNTER — LAB (OUTPATIENT)
Dept: LAB | Facility: CLINIC | Age: 59
End: 2024-11-25
Payer: COMMERCIAL

## 2024-11-25 ENCOUNTER — TELEPHONE (OUTPATIENT)
Dept: INTERNAL MEDICINE | Facility: CLINIC | Age: 59
End: 2024-11-25

## 2024-11-25 ENCOUNTER — ANCILLARY PROCEDURE (OUTPATIENT)
Dept: CT IMAGING | Facility: CLINIC | Age: 59
End: 2024-11-25
Attending: COLON & RECTAL SURGERY
Payer: COMMERCIAL

## 2024-11-25 ENCOUNTER — HOSPITAL ENCOUNTER (EMERGENCY)
Facility: CLINIC | Age: 59
Discharge: HOME | End: 2024-11-25
Attending: EMERGENCY MEDICINE | Admitting: EMERGENCY MEDICINE
Payer: COMMERCIAL

## 2024-11-25 VITALS
DIASTOLIC BLOOD PRESSURE: 66 MMHG | HEART RATE: 117 BPM | SYSTOLIC BLOOD PRESSURE: 100 MMHG | OXYGEN SATURATION: 99 % | RESPIRATION RATE: 20 BRPM | TEMPERATURE: 98.5 F

## 2024-11-25 DIAGNOSIS — Z09 FOLLOW-UP EXAMINATION AFTER COLORECTAL SURGERY: ICD-10-CM

## 2024-11-25 DIAGNOSIS — I26.93 SINGLE SUBSEGMENTAL PULMONARY EMBOLISM WITHOUT ACUTE COR PULMONALE (H): ICD-10-CM

## 2024-11-25 DIAGNOSIS — Z09 FOLLOW-UP EXAMINATION AFTER COLORECTAL SURGERY: Primary | ICD-10-CM

## 2024-11-25 LAB
ALBUMIN SERPL BCG-MCNC: 3.3 G/DL (ref 3.5–5.2)
ALP SERPL-CCNC: 582 U/L (ref 40–150)
ALT SERPL W P-5'-P-CCNC: 118 U/L (ref 0–50)
ANION GAP SERPL CALCULATED.3IONS-SCNC: 11 MMOL/L (ref 7–15)
AST SERPL W P-5'-P-CCNC: 51 U/L (ref 0–45)
BASOPHILS # BLD AUTO: 0.1 10E3/UL (ref 0–0.2)
BASOPHILS NFR BLD AUTO: 1 %
BILIRUB SERPL-MCNC: 0.3 MG/DL
BUN SERPL-MCNC: 36.9 MG/DL (ref 6–20)
CALCIUM SERPL-MCNC: 8.9 MG/DL (ref 8.8–10.4)
CHLORIDE SERPL-SCNC: 100 MMOL/L (ref 98–107)
CREAT SERPL-MCNC: 0.76 MG/DL (ref 0.51–0.95)
EGFRCR SERPLBLD CKD-EPI 2021: 90 ML/MIN/1.73M2
EOSINOPHIL # BLD AUTO: 0.4 10E3/UL (ref 0–0.7)
EOSINOPHIL NFR BLD AUTO: 2 %
ERYTHROCYTE [DISTWIDTH] IN BLOOD BY AUTOMATED COUNT: 14.3 % (ref 10–15)
GLUCOSE SERPL-MCNC: 254 MG/DL (ref 70–99)
HCO3 SERPL-SCNC: 24 MMOL/L (ref 22–29)
HCT VFR BLD AUTO: 29.5 % (ref 35–47)
HGB BLD-MCNC: 9.5 G/DL (ref 11.7–15.7)
IMM GRANULOCYTES # BLD: 0.1 10E3/UL
IMM GRANULOCYTES NFR BLD: 0 %
LYMPHOCYTES # BLD AUTO: 1.3 10E3/UL (ref 0.8–5.3)
LYMPHOCYTES NFR BLD AUTO: 7 %
MCH RBC QN AUTO: 28.2 PG (ref 26.5–33)
MCHC RBC AUTO-ENTMCNC: 32.2 G/DL (ref 31.5–36.5)
MCV RBC AUTO: 88 FL (ref 78–100)
MONOCYTES # BLD AUTO: 0.3 10E3/UL (ref 0–1.3)
MONOCYTES NFR BLD AUTO: 2 %
NEUTROPHILS # BLD AUTO: 17.5 10E3/UL (ref 1.6–8.3)
NEUTROPHILS NFR BLD AUTO: 89 %
NRBC # BLD AUTO: 0.4 10E3/UL
NRBC BLD AUTO-RTO: 2 /100
PLATELET # BLD AUTO: 493 10E3/UL (ref 150–450)
POTASSIUM SERPL-SCNC: 4.4 MMOL/L (ref 3.4–5.3)
PROT SERPL-MCNC: 7 G/DL (ref 6.4–8.3)
RBC # BLD AUTO: 3.37 10E6/UL (ref 3.8–5.2)
SODIUM SERPL-SCNC: 135 MMOL/L (ref 135–145)
WBC # BLD AUTO: 19.7 10E3/UL (ref 4–11)

## 2024-11-25 PROCEDURE — 80053 COMPREHEN METABOLIC PANEL: CPT | Performed by: PATHOLOGY

## 2024-11-25 PROCEDURE — 93005 ELECTROCARDIOGRAM TRACING: CPT | Performed by: EMERGENCY MEDICINE

## 2024-11-25 PROCEDURE — 99284 EMERGENCY DEPT VISIT MOD MDM: CPT | Mod: 25 | Performed by: EMERGENCY MEDICINE

## 2024-11-25 PROCEDURE — 93010 ELECTROCARDIOGRAM REPORT: CPT | Performed by: EMERGENCY MEDICINE

## 2024-11-25 PROCEDURE — 99601 HOME NFS VISIT <2 HRS: CPT

## 2024-11-25 PROCEDURE — 250N000013 HC RX MED GY IP 250 OP 250 PS 637: Performed by: EMERGENCY MEDICINE

## 2024-11-25 PROCEDURE — A4217 STERILE WATER/SALINE, 500 ML: HCPCS

## 2024-11-25 PROCEDURE — 76604 US EXAM CHEST: CPT | Mod: 26 | Performed by: EMERGENCY MEDICINE

## 2024-11-25 PROCEDURE — 74177 CT ABD & PELVIS W/CONTRAST: CPT | Performed by: RADIOLOGY

## 2024-11-25 PROCEDURE — 85025 COMPLETE CBC W/AUTO DIFF WBC: CPT | Performed by: PATHOLOGY

## 2024-11-25 PROCEDURE — 76604 US EXAM CHEST: CPT | Performed by: EMERGENCY MEDICINE

## 2024-11-25 PROCEDURE — 71260 CT THORAX DX C+: CPT | Performed by: RADIOLOGY

## 2024-11-25 PROCEDURE — 36415 COLL VENOUS BLD VENIPUNCTURE: CPT | Performed by: PATHOLOGY

## 2024-11-25 PROCEDURE — 87040 BLOOD CULTURE FOR BACTERIA: CPT | Performed by: COLON & RECTAL SURGERY

## 2024-11-25 PROCEDURE — 99000 SPECIMEN HANDLING OFFICE-LAB: CPT | Performed by: PATHOLOGY

## 2024-11-25 PROCEDURE — B4185 PARENTERAL SOL 10 GM LIPIDS: HCPCS

## 2024-11-25 PROCEDURE — 87149 DNA/RNA DIRECT PROBE: CPT | Performed by: COLON & RECTAL SURGERY

## 2024-11-25 PROCEDURE — 87186 SC STD MICRODIL/AGAR DIL: CPT | Performed by: COLON & RECTAL SURGERY

## 2024-11-25 RX ORDER — APIXABAN 5 MG (74)
KIT ORAL
Qty: 74 EACH | Refills: 0 | Status: SHIPPED | OUTPATIENT
Start: 2024-11-25 | End: 2024-12-25

## 2024-11-25 RX ORDER — IOPAMIDOL 755 MG/ML
79 INJECTION, SOLUTION INTRAVASCULAR ONCE
Status: COMPLETED | OUTPATIENT
Start: 2024-11-25 | End: 2024-11-25

## 2024-11-25 RX ORDER — HEPARIN SODIUM,PORCINE 10 UNIT/ML
3 VIAL (ML) INTRAVENOUS ONCE
Status: COMPLETED | OUTPATIENT
Start: 2024-11-25 | End: 2024-11-25

## 2024-11-25 RX ORDER — APIXABAN 5 MG (74)
KIT ORAL
Qty: 74 EACH | Refills: 0 | Status: SHIPPED | OUTPATIENT
Start: 2024-11-25 | End: 2024-11-25

## 2024-11-25 RX ADMIN — IOPAMIDOL 79 ML: 755 INJECTION, SOLUTION INTRAVASCULAR at 14:45

## 2024-11-25 RX ADMIN — APIXABAN 10 MG: 5 TABLET, FILM COATED ORAL at 18:41

## 2024-11-25 RX ADMIN — Medication 3 ML: at 15:20

## 2024-11-25 ASSESSMENT — COLUMBIA-SUICIDE SEVERITY RATING SCALE - C-SSRS
2. HAVE YOU ACTUALLY HAD ANY THOUGHTS OF KILLING YOURSELF IN THE PAST MONTH?: NO
1. IN THE PAST MONTH, HAVE YOU WISHED YOU WERE DEAD OR WISHED YOU COULD GO TO SLEEP AND NOT WAKE UP?: NO
6. HAVE YOU EVER DONE ANYTHING, STARTED TO DO ANYTHING, OR PREPARED TO DO ANYTHING TO END YOUR LIFE?: NO

## 2024-11-25 ASSESSMENT — ACTIVITIES OF DAILY LIVING (ADL)
ADLS_ACUITY_SCORE: 62

## 2024-11-25 NOTE — DISCHARGE INSTRUCTIONS

## 2024-11-25 NOTE — TELEPHONE ENCOUNTER
Hello pt is requesting the amitriptyline 10 mg tabs and I do not see this on the med list can we please get this sent over please and thank you

## 2024-11-25 NOTE — TELEPHONE ENCOUNTER
Could someone please contact patient to clarify this request?  She has both 40mg and 60mg doses of compounded liquid amitriptyline on her list.    Thanks,  Juan Jose Gomez MD

## 2024-11-25 NOTE — ED TRIAGE NOTES
Triage Assessment (Adult)       Row Name 11/25/24 5808          Triage Assessment    Airway WDL WDL        Respiratory WDL    Respiratory WDL X        Skin Circulation/Temperature WDL    Skin Circulation/Temperature WDL WDL        Cardiac WDL    Cardiac WDL rhythm        Peripheral/Neurovascular WDL    Peripheral Neurovascular WDL WDL        Cognitive/Neuro/Behavioral WDL    Cognitive/Neuro/Behavioral WDL WDL

## 2024-11-25 NOTE — ED TRIAGE NOTES
Pt arrives ambulatory to triage after being instructed to be seen in ED for PE. Pt states she was driving when she was instructed to be seen for abnormal imaging. Symptoms stated as exertional sob. Denies cp.

## 2024-11-25 NOTE — ED PROVIDER NOTES
[place] EMERGENCY DEPARTMENT ([facility])    11/25/24       History     Chief Complaint   Patient presents with    abnormal imaging     HPI  Dinora Mcghee is a 59 year old female who with PMH of type 1 DM, chronic pancreatitis s/p TPAIT 12/2016, pituitary adenoma s/p resection, hypothyroidism, GERD, migraines, gastroparesis and slow transit constipation s/p ex lap, revision of J tube, small bowel resection w/ ileorectal anastomosis and diverting loop ileostomy 10/11/2024 admitted on 11/18/2024 after her J tube dislodged. Patient presents to the ED today due to abnormal imaging specifically with concern for subsegmental pulmonary embolism.     As per triage note: Patient arrives ambulatory to triage after being instructed to be seen in ED for PE. Patient states she was driving when she was instructed to be seen for abnormal imaging. Her symptoms stated as exertional sob. Denies cp.     As per Epic review: Patient was discharged on 11/19/24 after being treated for abdominal pain and dislodged jejunostomy tube at [facility]:   Hospital Course:   Abdominal pain  Displaced J tube  Hx chronic pancreatitis, gastroparesis, slow transit constipation w/ complicated surgical history including TPAIT 12/2016. Most recently s/p ex lap, revision of PEG-J tube, small bowel resection w/ ileorectal anastomosis and diverting loop ileostomy on 10/11/2024. Presented to ED 11/17 w/ abdominal pain, decreased ostomy output, and J tube balloon on top of skin -> subsequently fell completely out around 0100 on 11/18. CT prior to tube falling out w/ no obvious fluid collection or abscess, dilated loops of small bowel w/ decompressed loops in remainder of bowel similar to prior, stable ileostomy. GI evaluating for J tube replacement, but will defer at this time. Pain suspected to be 2/2 malpositioned J tube, improved somewhat now that it's out. Low suspicion for infection given stable leukocytosis, reassuring CT,  and absence of systemic symptoms thus held off on abx. No obvious obstruction on CT, decreased ostomy output possibly in s/o not getting TF. Nutrition consulted. Patient strongly preferred to discharge on 11/19. Discussed recommendation to monitor overnight, but patient opted to stay to attend OP visits. Discussed with GI and Nutrition, gradually advanced diet, which patient was able to tolerate. She will discharge with resumption of TPN per [facility] dietician and regular diet. Per discussion with patient, she is able to take her meds orally therefore will   -Continue [facility] TPN   -Full liquid diet, discussed gradual advancement to regular with patient  -Holding TF for now with J tube out   -Pain control: PO oxycodone 5-10 mg Q6H PRN (patient's home regimen), ordered 8 additional tablets on discharge      Migraine  Nausea and vomiting 11/18 per patient reportedly in the setting of  holding PTA meds.   -Resume PTA meds      Type 1 DM s/p TPIAT 12/2016  PTA on glargine 3 units daily w/ sliding scale. Initially hypoglycemic to 51 in ED, patient utilized her own glucagon to correct w/ repeat 120.   -Hold off on glargine and sliding scale on discharge      GERD: Continue PTA pantoprazole (per patient, was able to take orally)   Hypothyroidism, Hx pituitary adenoma s/p resection: Continue PTA levothyroxine (per patient, was able to take orally)        Past Medical History  Past Medical History:   Diagnosis Date    Allergic rhinitis, cause unspecified     Allergy to other foods     strawberries, apples, celeries, alice, watermelon    Arthritis     left knee    Choledocholithiasis     long after cholecystectomy    Chronic abdominal pain     Chronic constipation     Chronic nausea     Chronic pancreatitis (H)     Degeneration of lumbar or lumbosacral intervertebral disc     Esophageal reflux     w/ hiatal hernia    Gastroparesis     Hiatal hernia     History of pituitary adenoma     s/p resection    Hypothyroidism     Migraines      Mild hyperlipidemia     On tube feeding diet     presence of GJ tube    Pancreatic disease     PD stricture, 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 endometriosis growth    APPENDECTOMY       SECTION  1993    COLONOSCOPY  2014    COLONOSCOPY N/A 2023    Procedure: COLONOSCOPY, WITH POLYPECTOMY AND BIOPSY;  Surgeon: Percy Chamorro MD;  [facility]    ENDOSCOPIC INSERTION TUBE GASTROSTOMY N/A 2021    Procedure: Gastrojejonostomy placement with jejunopexy, PEG tube exchange;  Surgeon: Zackery Montoya MD;  [facility]    ENDOSCOPIC RETROGRADE CHOLANGIOPANCREATOGRAM N/A 2015    Procedure: ENDOSCOPIC RETROGRADE CHOLANGIOPANCREATOGRAM;  Surgeon: Mandeep Park MD;  [facility]    ENDOSCOPIC RETROGRADE CHOLANGIOPANCREATOGRAM N/A 2016    Procedure: COMBINED ENDOSCOPIC RETROGRADE CHOLANGIOPANCREATOGRAPHY, PLACE TUBE/STENT;  Surgeon: Mandeep Park MD;  [facility]    ENDOSCOPIC RETROGRADE CHOLANGIOPANCREATOGRAM N/A 2016    Procedure: COMBINED ENDOSCOPIC RETROGRADE CHOLANGIOPANCREATOGRAPHY, REMOVE FOREIGN BODY OR STENT/TUBE;  Surgeon: Mandeep Park MD;  [facility]    ENDOSCOPIC RETROGRADE CHOLANGIOPANCREATOGRAM N/A 2016    Procedure: COMBINED ENDOSCOPIC RETROGRADE CHOLANGIOPANCREATOGRAPHY, PLACE TUBE/STENT;  Surgeon: Mandeep Park MD;  [facility]    ENDOSCOPIC RETROGRADE CHOLANGIOPANCREATOGRAM N/A 2016    Procedure: COMBINED ENDOSCOPIC RETROGRADE CHOLANGIOPANCREATOGRAPHY, REMOVE FOREIGN BODY OR STENT/TUBE;  Surgeon: Mandeep Park MD;  [facility]    ENDOSCOPIC ULTRASOUND UPPER  GASTROINTESTINAL TRACT (GI) N/A 10/03/2016    Procedure: ENDOSCOPIC ULTRASOUND, ESOPHAGOSCOPY / UPPER GASTROINTESTINAL TRACT (GI);  Surgeon: Guru Jose Rodas MD;  [facility]    ENT SURGERY  1989    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    ENTEROSCOPY SMALL BOWEL N/A 02/03/2022    Procedure: ENTEROSCOPY with possible fistula closure;  Surgeon: Francisco Dodson MD;  Location:  GI    ENTEROSCOPY SMALL BOWEL N/A 9/11/2024    Procedure: Upper endoscopy, gastrostomy tube placement and jejunostomy tube exchange;  Surgeon: Zackery Montoya MD;  [facility]    ESOPHAGOSCOPY, GASTROSCOPY, DUODENOSCOPY (EGD), COMBINED N/A 06/24/2015    Procedure: COMBINED ESOPHAGOSCOPY, GASTROSCOPY, DUODENOSCOPY (EGD), REMOVE FOREIGN BODY;  Surgeon: Mandeep Park MD;  [facility]    ESOPHAGOSCOPY, GASTROSCOPY, DUODENOSCOPY (EGD), COMBINED N/A 10/25/2015    Procedure: COMBINED ESOPHAGOSCOPY, GASTROSCOPY, DUODENOSCOPY (EGD);  Surgeon: Sammy Amaro MD;  [facility]    ESOPHAGOSCOPY, GASTROSCOPY, DUODENOSCOPY (EGD), COMBINED N/A 10/25/2015    Procedure: COMBINED ESOPHAGOSCOPY, GASTROSCOPY, DUODENOSCOPY (EGD), BIOPSY SINGLE OR MULTIPLE;  Surgeon: Sammy Amaro MD;  [facility]    ESOPHAGOSCOPY, GASTROSCOPY, DUODENOSCOPY (EGD), COMBINED N/A 01/31/2023    Procedure: ESOPHAGOGASTRODUODENOSCOPY (EGD) with peg replacement ;  Surgeon: Mandeep Park MD;  [facility]    ESOPHAGOSCOPY, GASTROSCOPY, DUODENOSCOPY (EGD), COMBINED N/A 7/24/2024    Procedure: Esophagoscopy, gastroscopy, duodenoscopy (EGD), combined;  Surgeon: Zackery Montoya MD;  [facility]    ESOPHAGOSCOPY, GASTROSCOPY, DUODENOSCOPY (EGD), COMBINED N/A 10/31/2024    Procedure: Esophagoscopy, gastroscopy, duodenoscopy (EGD), combined;  Surgeon: Mandeep Park MD;  [facility]    ESOPHAGOSCOPY, GASTROSCOPY, DUODENOSCOPY (EGD), DILATATION, COMBINED      EXCISE LESION TRUNK N/A 04/17/2017     Procedure: EXCISE LESION TRUNK;  Removal of Abdominal Foreign Body;  Surgeon: Nestor Phoenix MD;  [facility]    HC ESOPH/GAS REFLUX TEST W NASAL IMPED >1 HR N/A 11/19/2015    Procedure: ESOPHAGEAL IMPEDENCE FUNCTION TEST WITH 24 HOUR PH GREATER THAN 1 HOUR;  Surgeon: Thiago Apple MD;  [facility]: [facility]     [facility] ENDOSCOPY DIAG W BIOPSY  09/17/2008    [facility] ENDOSCOPY DIAG W BIOPSY  09/27/2012    [facility] ENDOSCOPY W ESOPHAGEAL DILATION BALLOON <30MM  09/17/2008    [facility] ENDOSCOPY W EUS N/A 05/05/2015    Procedure: COMBINED ENDOSCOPIC ULTRASOUND, ESOPHAGOSCOPY, GASTROSCOPY, DUODENOSCOPY (EGD);  Surgeon: Wm Dueñas MD;  [facility]    HC WRIST ARTHROSCOP,RELEASE XVERS LIG Bilateral 12/17/2008    INJECT TRANSVERSUS ABDOMINIS PLANE (TAP) BLOCK BILATERAL Left 09/22/2016    Procedure: INJECT TRANSVERSUS ABDOMINIS PLANE (TAP) BLOCK BILATERAL;  Surgeon: Dickson Corrigan MD;  [facility]    INJECT TRIGGER POINT Bilateral 09/08/2022    Procedure: Abdominal trigger point injection with ultrasound;  Surgeon: Monika Mahajan MD;  [facility]    INJECT TRIGGER POINT SINGLE / MULTIPLE 3 OR MORE MUSCLES Right 11/15/2022    Procedure: Trigger point injections right abdomen with ultrasound guidance;  Surgeon: Monika Mahajan MD;  Location: [facility]    IR CHEST PORT PLACEMENT < 5 YRS OF AGE  06/10/2022    IR CVC TUNNEL PLACEMENT > 5 YRS OF AGE  4/15/2024    IR PORT REMOVAL RIGHT  11/09/2023    LAPAROSCOPIC ASSISTED COLECTOMY N/A 10/11/2024    Procedure: Exploratory laparotomy, extensive lysis of adhesions, revision of Jtube and small bowel resection, TOTAL ABDOMINAL COLECTOMY WITH ILEORECTAL ANASTAMOSIS, DIVERTING LOOP ILEOSTOMY, flexible sigmoidoscopy;  Surgeon: Kaylene Branch MD;  [facility]    laparoscopic pineda  01/01/1995    LAPAROSCOPIC HERNIORRHAPHY INCISIONAL N/A 08/23/2018    Procedure: LAPAROSCOPIC HERNIORRHAPHY INCISIONAL;  Laparoscopic Incisional Hernia Repair with  Symbotex Mesh Implant;  Surgeon: Nestor Phoenix MD;  [facility]    LAPAROSCOPIC PANCREATECTOMY, TRANSPLANT AUTO ISLET CELL N/A 12/28/2016    Procedure: LAPAROSCOPIC PANCREATECTOMY, TRANSPLANT AUTO ISLET CELL;  Surgeon: Nestor Phoenix MD;  [facility]    LAPAROTOMY EXPLORATORY N/A 01/31/2023    Procedure: Exploratory Laparotomy, lysis of adhesions, Perforated J-Junostomy Resection, Replace J-Junostomy site;  Surgeon: Elver Bauer MD;  [facility]    LAPAROTOMY, LYSIS ADHESIONS, COMBINED N/A 01/31/2023    Procedure: Dilatation of jejunostomy feeding tube, track with placement of jejunostomy tube with fluoroscopy;  Surgeon: Elver Bauer MD;  [facility]    PICC DOUBLE LUMEN PLACEMENT Left 11/13/2023    Basilic Vein 5F DL 43 cm    REMOVE AND REPLACE BREAST IMPLANT PROSTHESIS N/A 12/30/2022    Procedure: Exploratory Laparotomy, lysis of adhesions, small bowel resection. Placement of gastric jejunostomy for enteral feeding.;  Surgeon: Elver Bauer MD;  [facility]    REMOVE GASTROSTOMY TUBE ADULT N/A 01/28/2022    Procedure: REMOVAL, GASTROSTOMY TUBE, ADULT;  Surgeon: Mandeep Park MD;  [facility]    REPLACE GASTROJEJUNOSTOMY TUBE, PERCUTANEOUS N/A 09/07/2021    Procedure: GASTROJEJUNOSTOMY TUBE PLACEMENT, PERCUTANEOUS, WITH GASTROPEXY;  Surgeon: Mandeep Park MD;  [facility]    REPLACE GASTROJEJUNOSTOMY TUBE, PERCUTANEOUS N/A 09/22/2021    Procedure: REPLACEMENT, GASTROJEJUNOSTOMY TUBE, PERCUTANEOUS;  Surgeon: Zackery Montoya MD;  [facility]    REPLACE GASTROJEJUNOSTOMY TUBE, PERCUTANEOUS N/A 11/22/2022    Procedure: REPLACEMENT, GASTROJEJUNOSTOMY TUBE, PERCUTANEOUS;  Surgeon: Mandeep Park MD;  [facility]    REPLACE GASTROJEJUNOSTOMY TUBE, PERCUTANEOUS N/A 12/09/2022    Procedure: REPLACEMENT, GASTROJEJUNOSTOMY TUBE, PERCUTANEOUS with ENDOSCOPIC SUTURING.;  Surgeon: Mandeep Park MD;  [facility]     REPLACE GASTROJEJUNOSTOMY TUBE, PERCUTANEOUS N/A 2023    Procedure: Replace Gastrojejunostomy Tube, Percutaneous;  Surgeon: Mandeep Park MD;  [facility]    REPLACE GASTROJEJUNOSTOMY TUBE, PERCUTANEOUS N/A 2023    Procedure: Replace Gastrojejunostomy Tube, Percutaneous;  Surgeon: Francisco Dodson MD;  [facility]    REPLACE GASTROJEJUNOSTOMY TUBE, PERCUTANEOUS N/A 2023    Procedure: Replace Gastrojejunostomy Tube, Percutaneous;  Surgeon: Mandeep Park MD;  [facility]    REPLACE GASTROSTOMY TUBE, PERCUTANEOUS N/A 10/31/2024    Procedure: REPLACEMENT, GASTROSTOMY TUBE, PERCUTANEOUS;  Surgeon: Mandeep Park MD;  [facility]    REPLACE JEJUNOSTOMY TUBE, PERCUTANEOUS N/A 09/10/2021    Procedure: UPPER ENDOSCOPY, REPLACEMENT OF PERCUTANEOUS GASTROJEJUNOSTOMY TUBE, T-TAG GASTROPEXY;  Surgeon: Zackery Montoya MD;  [facility]    REPLACE JEJUNOSTOMY TUBE, PERCUTANEOUS N/A 2021    Procedure: REPLACEMENT, JEJUNOSTOMY TUBE, PERCUTANEOUS;  Surgeon: Mandeep Park MD;  [facility]    REPLACE JEJUNOSTOMY TUBE, PERCUTANEOUS N/A 2023    Procedure: REPLACEMENT, JEJUNOSTOMY TUBE, PERCUTANEOUS;  Surgeon: Mandeep Park MD;  [facility]    REPLACE JEJUNOSTOMY TUBE, PERCUTANEOUS  2023    Procedure: Replace Jejunostomy Tube, Percutaneous;  Surgeon: Mandeep Park MD;  [facility]    REPLACE JEJUNOSTOMY TUBE, PERCUTANEOUS N/A 2024    Procedure: REPLACEMENT, JEJUNOSTOMY TUBE, PERCUTANEOUS;  Surgeon: Francisco Dodson MD;  [facility]    REPLACE JEJUNOSTOMY TUBE, PERCUTANEOUS N/A 10/31/2024    Procedure: REPLACEMENT, JEJUNOSTOMY TUBE, PERCUTANEOUS;  Surgeon: Mandeep Park MD;  [facility]    transphenoidal pituitary resection  1990    [facility]  DELIVERY ONLY  1996    [facility]  DELIVERY ONLY  1998    repeat c section with incidental cystotomy with repair    ZZC EXCIS  "PITUITARY,TRANSNASAL/SEPTAL  01/01/1980    pituitary tumor removed for diabetes insipidus    ZZC TOTAL ABDOM HYSTERECTOMY  01/01/1999    w/ bilateral salpingoophorectomy      Apixaban Starter Pack (ELIQUIS DVT/PE STARTER PACK) 5 MG TBPK  acetaminophen (TYLENOL) 325 MG/10.15ML solution  baclofen (LIORESAL) 10 MG tablet  cetirizine (ZYRTEC) 10 MG tablet  COMPOUNDED NON-CONTROLLED SUBSTANCE (CMPD RX) - PHARMACY TO MIX COMPOUNDED MEDICATION  COMPOUNDED NON-CONTROLLED SUBSTANCE (CMPD RX) - PHARMACY TO MIX COMPOUNDED MEDICATION  Continuous Blood Gluc Sensor (FREESTYLE LISA 3 SENSOR) MISC  Continuous Glucose Sensor (FREESTYLE LISA 3 SENSOR) MISC  cyclobenzaprine (FLEXERIL) 10 MG tablet  diphenhydrAMINE (BENADRYL) 12.5 MG/5ML solution  DULoxetine HCl 60 MG CSDR  Emergency Supply Kit, [facility],  estradiol (VAGIFEM) 10 MCG TABS vaginal tablet  famotidine (PEPCID) 40 MG/5ML suspension  glucagon 1 MG kit  glucose 40 % GEL gel  hydrOXYzine HCl (ATARAX) 25 MG tablet  ibuprofen (ADVIL/MOTRIN) 400 MG tablet  insulin aspart (NOVOLOG FLEXPEN) 100 UNIT/ML pen  insulin regular 18 Units 0.18 mL syringe  insulin syringe-needle U-100 (30G X 1/2\" 0.3 ML) 30G X 1/2\" 0.3 ML miscellaneous  lactated ringers in 1,000 mL via CADD pump  lactated ringers infusion  levothyroxine (SYNTHROID) 25 mcg/mL SUSP  lidocaine (LIDODERM) 5 % patch  lipase-protease-amylase (CREON) 64799-01168-638617 units CPEP per EC capsule  meclizine (ANTIVERT) 25 MG tablet  methocarbamol (ROBAXIN) 750 MG tablet  miconazole (MICATIN) 2 % external powder  montelukast (SINGULAIR) 10 MG tablet  Multiple Vitamin (INFUVITE ADULT) injection  Ostomy Supplies MISC  oxyCODONE (ROXICODONE) 5 MG tablet  oxyCODONE (ROXICODONE) 5 MG/5ML solution  pantoprazole (PROTONIX) 40 mg IV push injection  pantoprazole (PROTONIX) injection  parenteral nutrition - TNA - see order for formula  prochlorperazine (COMPAZINE) 10 MG tablet  prochlorperazine (COMPAZINE) 10 MG tablet  promethazine " (PHENERGAN) injection  Prosource TF PO LIQD (PROSOURCE TF)  rimegepant (NURTEC) 75 MG ODT tablet  scopolamine (TRANSDERM) 1 MG/3DAYS 72 hr patch  Sharps Container (BD SHARPS ) MISC  silver nitrate (ARZOL SILVER NIT APPLICATORS) 75-25 % miscellaneous  sodium chloride 0.9% elastomeric pump  sodium chloride, PF, 0.9% PF flush  sodium chloride, PF, 0.9% PF flush  sodium chloride, PF, 0.9% PF flush  STATIN NOT PRESCRIBED (INTENTIONAL)  SYNDROS 5 MG/ML oral soln  traZODone (DESYREL) 50 MG tablet  zinc oxide - white petrolatum (CRITIC-AID THICK MOIST BARRIER) 20-51% PSTE topical paste  ZOLMitriptan (ZOMIG-ZMT) 5 MG ODT      Allergies   Allergen Reactions    Apple Juice Anaphylaxis    Corticosteroids Other (See Comments)     All oral, IV and injectable steroids are contraindicated (unless in life threatening situations) in Islet Auto transplant recipients. They can cause irreversible loss of islet cell function. Please contact patient's transplant care coordinator ADI Gaffney RN at [phone]/pager [phone] and/or endocrinologist prior to administration.      Depakote [Divalproex Sodium] Other (See Comments)     Chest pain    Lactose Nausea and Vomiting and Cramps    Zithromax [Azithromycin Dihydrate] Anaphylaxis     Noted in 4/7/08 ER    Bromfenac      Other reaction(s): Headache    Codeine      Other reaction(s): Hallucinations    Darvocet [Propoxyphene N-Apap] Itching    Morphine Nausea and Vomiting and Rash    Nalbuphine Hcl Rash     RASH :nubaine    Bactrim [Sulfamethoxazole W-Trimethoprim] Other (See Comments) and Nausea and Vomiting     Severely low liver function.    Statins Other (See Comments)     Previous liver issues, risks vs benefits felt to not warrant statin.  Discussed Oct 2022 visit    Tape [Adhesive Tape] Blisters    Tramadol     Zofran [Ondansetron] Other (See Comments)     migraine    Flagyl [Metronidazole] Hives and Rash     Family History  Family History   Problem Relation Age of  Onset    Lipids Mother     Hypertension Mother     Thyroid Disease Mother     Depression Mother     Angina Mother     GERD Mother     Skin Cancer Mother     Migraines Mother     Autoimmune Disease Mother     Hyperlipidemia Mother     Mental Illness Mother     Cerebrovascular Disease Mother         11/2023    Other Cancer Mother         skin-basil cell    Anxiety Disorder Mother     Post Operative Nausea and Vomiting Mother     Eye Disorder Father         cataract, detached retina    Myocardial Infarction Father 60    Lipids Father     Cerebrovascular Disease Father     Depression Father     Substance Abuse Father     Anesthesia Reaction Father         stroke right after surgery    Cataracts Father     Osteoarthritis Father     Ulcerative Colitis Father     Autoimmune Disease Father     Heart Disease Father     Hyperlipidemia Father     Mental Illness Father     Other Cancer Father         myloma    Anxiety Disorder Father     Interpersonal Violence Sister     Coronary Artery Disease Sister         angioplasty    GERD Sister     Substance Abuse Sister     Cerebrovascular Disease Sister         2005    Depression Sister     Thyroid Disease Sister     Autoimmune Disease Sister     Depression Sister     Mental Illness Sister     Substance Abuse Sister     Thyroid Disease Sister     Eye Disorder Maternal Grandmother         cataract    Thyroid Disease Maternal Grandmother     Diabetes Maternal Grandfather     Eye Disorder Paternal Grandmother         cataract    Diabetes Paternal Grandmother     Eye Disorder Paternal Grandfather         cataract    Diabetes Paternal Grandfather     Substance Abuse Paternal Grandfather     Eye Disorder Son         ptosis    Depression Son     Anxiety Disorder Son     Depression Son     Mental Illness Son     Anxiety Disorder Son     Heart Disease Paternal Aunt     Diabetes Paternal Aunt     Diabetes Paternal Uncle     Heart Disease Paternal Uncle     Depression Nephew     Anxiety Disorder  Nephew     Thyroid Disease Nephew     Thyroid Disease Nephew     Anxiety Disorder Nephew     Diabetes Type 2  Cousin         paternal cousin    Autoimmune Disease Cousin      Social History   Social History     Tobacco Use    Smoking status: Former     Current packs/day: 0.00     Average packs/day: 0.5 packs/day for 6.3 years (3.2 ttl pk-yrs)     Types: Cigarettes     Start date: 1985     Quit date: 1992     Years since quittin.9    Tobacco comments:     no 2nd hand   Vaping Use    Vaping status: Some Days    Substances: THC, CBD   Substance Use Topics    Alcohol use: Not Currently     Alcohol/week: 3.0 - 6.0 standard drinks of alcohol     Types: 1 - 2 Glasses of wine, 1 - 2 Cans of beer, 1 - 2 Shots of liquor per week     Comment: none since I[facility]    Drug use: Yes     Comment: medical canabis tincture and vape      Past medical history, past surgical history, medications, allergies, family history, and social history were reviewed with the patient. No additional pertinent items.   A complete review of systems was performed with pertinent positives and negatives noted in the HPI, and all other systems negative.    Physical Exam   BP: 100/66  Pulse: 117  Temp: 98.5  F (36.9  C)  Resp: 20  SpO2: 99 %  Physical Exam  Vitals and nursing note reviewed.   Constitutional:       General: She is not in acute distress.     Appearance: Normal appearance. She is not diaphoretic.   HENT:      Head: Atraumatic.      Mouth/Throat:      Mouth: Mucous membranes are moist.   Eyes:      General: No scleral icterus.     Conjunctiva/sclera: Conjunctivae normal.   Cardiovascular:      Rate and Rhythm: Normal rate.      Heart sounds: Normal heart sounds.   Pulmonary:      Effort: No respiratory distress.      Breath sounds: Normal breath sounds.   Abdominal:      General: Abdomen is flat.   Musculoskeletal:      Cervical back: Neck supple.   Skin:     General: Skin is warm.      Findings: No rash.   Neurological:      General:  No focal deficit present.      Mental Status: She is alert and oriented to person, place, and time.   Psychiatric:         Mood and Affect: Mood normal.         Behavior: Behavior normal.           ED Course, Procedures, & Data      Procedures  Results for orders placed during the hospital encounter of 11/25/24    POC US ECHO LIMITED    Impression  Limited Bedside Cardiac Ultrasound, performed and interpreted by me.  Indication: Chest Pain and evaluation of right heart strain in setting of recent PE diagnosis.  Parasternal long axis, parasternal short axis, and apical 4 chamber views were acquired.  Image quality was satisfactory.    Findings:  Global left ventricular function appears intact.  Chambers do not appear dilated.  There is no evidence of free fluid within the pericardium.    IMPRESSION: Grossly normal left ventricular function and chamber size.  No pericardial effusion. No obvious right heart strain.            EKG Interpretation:      Interpreted by Estrada Newman MD  Time reviewed: 17:24  Symptoms at time of EKG: chest pain, PE diagnosis   Rhythm: normal sinus   Rate: tachycardic (rate 112)  Axis: normal  Ectopy: none  Conduction: normal  ST Segments/ T Waves: No ST elevations or t-wave changes  Q Waves: none  Comparison to prior: Unchanged from prior except for tachycardia    Clinical Impression: sinus tachycardia, no right axis deviation, no stemi, consistent with prior           Results for orders placed or performed during the hospital encounter of 11/25/24   POC US ECHO LIMITED     Status: None    Impression    Limited Bedside Cardiac Ultrasound, performed and interpreted by me.   Indication: Chest Pain and evaluation of right heart strain in setting of recent PE diagnosis.  Parasternal long axis, parasternal short axis, and apical 4 chamber views were acquired.   Image quality was satisfactory.    Findings:    Global left ventricular function appears intact.  Chambers do not appear  dilated.  There is no evidence of free fluid within the pericardium.    IMPRESSION: Grossly normal left ventricular function and chamber size.  No pericardial effusion. No obvious right heart strain.       EKG 12 lead     Status: None (Preliminary result)   Result Value Ref Range    Systolic Blood Pressure  mmHg    Diastolic Blood Pressure  mmHg    Ventricular Rate 112 BPM    Atrial Rate 112 BPM    CO Interval 156 ms    QRS Duration 86 ms     ms    QTc 472 ms    P Axis 66 degrees    R AXIS 89 degrees    T Axis 71 degrees    Interpretation ECG       Sinus tachycardia  Low voltage QRS  Borderline ECG     Results for orders placed or performed in visit on 11/25/24   Comprehensive metabolic panel     Status: Abnormal   Result Value Ref Range    Sodium 135 135 - 145 mmol/L    Potassium 4.4 3.4 - 5.3 mmol/L    Carbon Dioxide (CO2) 24 22 - 29 mmol/L    Anion Gap 11 7 - 15 mmol/L    Urea Nitrogen 36.9 (H) 6.0 - 20.0 mg/dL    Creatinine 0.76 0.51 - 0.95 mg/dL    GFR Estimate 90 >60 mL/min/1.73m2    Calcium 8.9 8.8 - 10.4 mg/dL    Chloride 100 98 - 107 mmol/L    Glucose 254 (H) 70 - 99 mg/dL    Alkaline Phosphatase 582 (H) 40 - 150 U/L    AST 51 (H) 0 - 45 U/L     (H) 0 - 50 U/L    Protein Total 7.0 6.4 - 8.3 g/dL    Albumin 3.3 (L) 3.5 - 5.2 g/dL    Bilirubin Total 0.3 <=1.2 mg/dL   CBC with platelets and differential     Status: Abnormal   Result Value Ref Range    WBC Count 19.7 (H) 4.0 - 11.0 10e3/uL    RBC Count 3.37 (L) 3.80 - 5.20 10e6/uL    Hemoglobin 9.5 (L) 11.7 - 15.7 g/dL    Hematocrit 29.5 (L) 35.0 - 47.0 %    MCV 88 78 - 100 fL    MCH 28.2 26.5 - 33.0 pg    MCHC 32.2 31.5 - 36.5 g/dL    RDW 14.3 10.0 - 15.0 %    Platelet Count 493 (H) 150 - 450 10e3/uL    % Neutrophils 89 %    % Lymphocytes 7 %    % Monocytes 2 %    % Eosinophils 2 %    % Basophils 1 %    % Immature Granulocytes 0 %    NRBCs per 100 WBC 2 (H) <1 /100    Absolute Neutrophils 17.5 (H) 1.6 - 8.3 10e3/uL    Absolute Lymphocytes 1.3 0.8  - 5.3 10e3/uL    Absolute Monocytes 0.3 0.0 - 1.3 10e3/uL    Absolute Eosinophils 0.4 0.0 - 0.7 10e3/uL    Absolute Basophils 0.1 0.0 - 0.2 10e3/uL    Absolute Immature Granulocytes 0.1 <=0.4 10e3/uL    Absolute NRBCs 0.4 10e3/uL   CBC with Platelets & Differential     Status: Abnormal    Narrative    The following orders were created for panel order CBC with Platelets & Differential.  Procedure                               Abnormality         Status                     ---------                               -----------         ------                     CBC with platelets and d...[[id number]]  Abnormal            Final result                 Please view results for these tests on the individual orders.   Results for orders placed or performed in visit on 11/25/24   CT Chest/Abdomen/Pelvis w Contrast     Status: None (Preliminary result)    Impression    RESIDENT PRELIMINARY INTERPRETATION  IMPRESSION:  1. Subsegmental pulmonary embolus within the pulmonary arteries of the  right lower lobe with associated small pulmonary infarction and trace  right pleural effusion.    2. Prominent and mildly enlarged mediastinal and axillary lymph nodes,  may be reactive.    3. Extensive postsurgical changes within the abdomen and pelvis was  improved dilated small bowel loops within the left upper quadrant   adjacent to the small bowel anastomosis without transition point, this  likely represents chronic dilation of small bowel.    4. Trace amount of gas along the tract in the left upper quadrant  abdominal wall for previous jejunostomy tube.        [Urgent Result: Subsegmental pulmonary embolus in the right lower lobe  with pulmonary infarction]    Finding was identified on 11/25/2024 3:28 PM.     Dr. Branch was contacted by Dr. Leslie at 11/25/2024 3:33 PM  via [employer] and communicated understanding of the urgent finding.      Medications   apixaban ANTICOAGULANT (ELIQUIS) tablet 10 mg (10 mg Oral $Given 11/25/24 1263)      Labs Ordered and Resulted from Time of ED Arrival to Time of ED Departure - No data to display  POC US ECHO LIMITED   Final Result   Limited Bedside Cardiac Ultrasound, performed and interpreted by me.    Indication: Chest Pain and evaluation of right heart strain in setting of recent PE diagnosis.   Parasternal long axis, parasternal short axis, and apical 4 chamber views were acquired.    Image quality was satisfactory.      Findings:     Global left ventricular function appears intact.   Chambers do not appear dilated.   There is no evidence of free fluid within the pericardium.      IMPRESSION: Grossly normal left ventricular function and chamber size.  No pericardial effusion. No obvious right heart strain.                   Critical care was not performed.     Medical Decision Making  The patient's presentation was of high complexity (an acute health issue posing potential threat to life or bodily function).    The patient's evaluation involved:  review of external note(s) from 1 sources (see separate area of note for details)  review of 3+ test result(s) ordered prior to this encounter (see separate area of note for details)  ordering and/or review of 2 test(s) in this encounter (see separate area of note for details)    The patient's management necessitated moderate risk (prescription drug management including medications given in the ED).    Assessment & Plan    Dinora Mcghee is a 59 year old female who with PMH of type 1 DM, chronic pancreatitis s/p TPAIT 12/2016, pituitary adenoma s/p resection, hypothyroidism, GERD, migraines, gastroparesis and slow transit constipation s/p ex lap, revision of J tube, small bowel resection w/ ileorectal anastomosis and diverting loop ileostomy 10/11/2024 admitted on 11/18/2024 after her J tube dislodged. Patient presents to the ED today due to abnormal imaging specifically with concern for subsegmental pulmonary embolism.     Patient is nontoxic-appearing on exam,  tachycardic with regular rhythm on arrival, has other vital signs within normal limits.  I reviewed her imaging and she would be a candidate for anticoagulation therapy however we should further ensure she does not evidence of right heart strain.  EKG was obtained and showed sinus tachycardia with no evidence of right axis deviation, no obvious signs of right heart strain.  I performed and interpreted a bedside point-of-care echocardiogram and did not see any evidence of right heart strain on this.  Patient with reassuring overall evaluation, started on Eliquis which was given here and prescribed for the patient.  She was discharged with outpatient follow-up and return precautions.    I have reviewed the nursing notes. I have reviewed the findings, diagnosis, plan and need for follow up with the patient.    Discharge Medication List as of 11/25/2024  6:39 PM        START taking these medications    Details   insulin regular 18 Units 0.18 mL syringe Add to infusion 18 Units daily. Add 1 syringe to TPN daily immediately prior to infusing. NOT for direct injection.  For use in TPN only. Syringe contains 5 units of overfill that will remain in the needle., Disp-90 mL, R-0, NormalDosing range: 0.05- 0. 2 units per gram dextrose.  For doses >0.25 units/gm dextrose, consult endocrinology. Re-entered for compounding corrections             Final diagnoses:   Single subsegmental pulmonary embolism without acute cor pulmonale (H)       Estrada Newman MD  [facility] EMERGENCY DEPARTMENT  11/25/2024     Estrada Newman MD  11/25/24 1280

## 2024-11-26 ENCOUNTER — TELEPHONE (OUTPATIENT)
Dept: SURGERY | Facility: CLINIC | Age: 59
End: 2024-11-26
Payer: COMMERCIAL

## 2024-11-26 LAB
ACINETOBACTER SPECIES: NOT DETECTED
ATRIAL RATE - MUSE: 112 BPM
CITROBACTER SPECIES: NOT DETECTED
CTX-M: NOT DETECTED
DIASTOLIC BLOOD PRESSURE - MUSE: NORMAL MMHG
ENTEROBACTER SPECIES: NOT DETECTED
ESCHERICHIA COLI: NOT DETECTED
IMP: NOT DETECTED
INTERPRETATION ECG - MUSE: NORMAL
KLEBSIELLA OXYTOCA: NOT DETECTED
KLEBSIELLA PNEUMONIAE: DETECTED
KPC: NOT DETECTED
NDM: NOT DETECTED
OXA (DETECTED/NOT DETECTED): NOT DETECTED
P AXIS - MUSE: 66 DEGREES
PR INTERVAL - MUSE: 156 MS
PROTEUS SPECIES: NOT DETECTED
PSEUDOMONAS AERUGINOSA: NOT DETECTED
QRS DURATION - MUSE: 86 MS
QT - MUSE: 346 MS
QTC - MUSE: 472 MS
R AXIS - MUSE: 89 DEGREES
RADIOLOGIST FLAGS: ABNORMAL
SYSTOLIC BLOOD PRESSURE - MUSE: NORMAL MMHG
T AXIS - MUSE: 71 DEGREES
VENTRICULAR RATE- MUSE: 112 BPM
VIM: NOT DETECTED

## 2024-11-26 NOTE — TELEPHONE ENCOUNTER
Called by Lab regarding a critical result.    Blood culture bottle x2 positive for Gram negative Bacilli (likely klebsiella) collected 11/25/2024 around 1500 pm from the Emergency room.     Called pt around 7:15 am.  No answer.  Did leave a voice message regarding positive blood cultures and to present to ER immediately for IV antibiotics.    Called patient again and no answer.     Called , no answer.      Pt is s/p BAUDILIO, Small bowel resection, revision of J-tube, TAC with ileorectal anastomosis and DLI on 10/11.  On TPN at baseline.      Was briefly readmitted on 11/17-11/19 with J-tube dislodgement.     Had shortness of breath and obtained outpatient CT scan on 11/25 that showed subsegmental PE within the pulm arteries of the RLL associated with small pulmonary infarction.  Trace thrombus and air in the SVC adj to the catheter.     Pt presented to ER yesterday 11/25 due to the PE findings on outpatient CT Scan.  Blood cultures were obtained during the ER work up.  Pt was started on Eliquis and then discharged with outpatient follow up.     Blood cultures have now returned positive.     Discussed with Dr. Branch.  Will continue to try to reach out to patient.

## 2024-11-26 NOTE — TELEPHONE ENCOUNTER
Reached patient via telephone.     Pt is aware of positive blood cultures x2.  She is feeling somewhat ok.  Is having some bloody stool leakage from the J-tube site.     Pt is trying to find a ride.  Pt may stay closer to home ([facility]) due to holidays, pt's father just had surgery, and there are no beds at [facility] and would likely board in the ER.     Discussed with Dr. Branch

## 2024-11-26 NOTE — TELEPHONE ENCOUNTER
Per chart review, pt presented to the Emergency Department in [place], [place]. See MyChart encounter dated 11/26/2024.

## 2024-11-27 ENCOUNTER — HOME INFUSION (OUTPATIENT)
Dept: HOME HEALTH SERVICES | Facility: HOME HEALTH | Age: 59
End: 2024-11-27
Payer: COMMERCIAL

## 2024-11-27 DIAGNOSIS — R78.81 BACTEREMIA: Primary | ICD-10-CM

## 2024-11-28 LAB
BACTERIA BLD CULT: ABNORMAL

## 2024-11-29 RX ORDER — CEFTRIAXONE SODIUM 10 G/100ML
2000 INJECTION, POWDER, FOR SOLUTION INTRAVENOUS EVERY 24 HOURS
Qty: 200 ML | Refills: 0 | Status: DISPENSED | OUTPATIENT
Start: 2024-11-29 | End: 2024-12-09

## 2024-11-30 ENCOUNTER — MYC MEDICAL ADVICE (OUTPATIENT)
Dept: GASTROENTEROLOGY | Facility: CLINIC | Age: 59
End: 2024-11-30
Payer: COMMERCIAL

## 2024-11-30 ENCOUNTER — HOME INFUSION BILLING (OUTPATIENT)
Dept: HOME HEALTH SERVICES | Facility: HOME HEALTH | Age: 59
End: 2024-11-30
Payer: COMMERCIAL

## 2024-12-02 ENCOUNTER — HOSPITAL ENCOUNTER (OUTPATIENT)
Facility: CLINIC | Age: 59
Discharge: HOME | End: 2024-12-02
Attending: INTERNAL MEDICINE | Admitting: INTERNAL MEDICINE
Payer: COMMERCIAL

## 2024-12-02 ENCOUNTER — HOME INFUSION (OUTPATIENT)
Dept: HOME HEALTH SERVICES | Facility: HOME HEALTH | Age: 59
End: 2024-12-02
Payer: COMMERCIAL

## 2024-12-02 ENCOUNTER — TELEPHONE (OUTPATIENT)
Dept: SURGERY | Facility: CLINIC | Age: 59
End: 2024-12-02
Payer: COMMERCIAL

## 2024-12-02 ENCOUNTER — TRANSFERRED RECORDS (OUTPATIENT)
Dept: HEALTH INFORMATION MANAGEMENT | Facility: CLINIC | Age: 59
End: 2024-12-02
Payer: COMMERCIAL

## 2024-12-02 VITALS
SYSTOLIC BLOOD PRESSURE: 105 MMHG | DIASTOLIC BLOOD PRESSURE: 68 MMHG | OXYGEN SATURATION: 100 % | RESPIRATION RATE: 16 BRPM

## 2024-12-02 DIAGNOSIS — R78.81 BACTEREMIA: Primary | ICD-10-CM

## 2024-12-02 LAB — PROVATION GI EXAM: NORMAL

## 2024-12-02 PROCEDURE — 99601 HOME NFS VISIT <2 HRS: CPT

## 2024-12-02 ASSESSMENT — ACTIVITIES OF DAILY LIVING (ADL)
ADLS_ACUITY_SCORE: 62
ADLS_ACUITY_SCORE: 62

## 2024-12-02 NOTE — TELEPHONE ENCOUNTER
[facility] Call Center    Phone Message    May a detailed message be left on voicemail: yes     Reason for Call: Order(s): Other:   Reason for requested: Hydration and other IV medication renewals   Date needed: ASAP  Provider name: Dr. Branch    Action Taken: Message routed to:  [facility] Rectal    Travel Screening: Not Applicable     Date of Service:

## 2024-12-02 NOTE — TELEPHONE ENCOUNTER
Patient has been offered a virtual appt with Dr. Gomez on 12/6/2024 at 3:30pm.  (per 11/30/2024 My[facility] Med Adv encounter)    Waiting for pt's confirmation of date/time either via phone or by MyC response to Clinic Coordinators

## 2024-12-02 NOTE — TELEPHONE ENCOUNTER
Spoke with pharmacist Veronica from [facility]. Verbal orders for medication renewals given.

## 2024-12-04 DIAGNOSIS — K21.9 GASTROESOPHAGEAL REFLUX DISEASE WITHOUT ESOPHAGITIS: Primary | ICD-10-CM

## 2024-12-04 RX ORDER — FAMOTIDINE 20 MG/1
20 TABLET, FILM COATED ORAL DAILY
Qty: 90 TABLET | Refills: 1 | Status: SHIPPED | OUTPATIENT
Start: 2024-12-04

## 2024-12-04 NOTE — TELEPHONE ENCOUNTER
Called patient to check in.     Per patient, she's eating, trying to get enough calories. Sees Dr Gomez on Friday. Per patient, IV fluids have been restarted, pt will get delivery Friday. Home care orders still in place. Asked that she reach out with any needs, patient understands plan.      ML

## 2024-12-05 ENCOUNTER — MYC MEDICAL ADVICE (OUTPATIENT)
Dept: CARE COORDINATION | Facility: CLINIC | Age: 59
End: 2024-12-05
Payer: COMMERCIAL

## 2024-12-05 ENCOUNTER — TELEPHONE (OUTPATIENT)
Dept: GASTROENTEROLOGY | Facility: CLINIC | Age: 59
End: 2024-12-05
Payer: COMMERCIAL

## 2024-12-05 ENCOUNTER — PATIENT OUTREACH (OUTPATIENT)
Dept: CARE COORDINATION | Facility: CLINIC | Age: 59
End: 2024-12-05
Payer: COMMERCIAL

## 2024-12-05 NOTE — PROGRESS NOTES
Clinic Care Coordination Contact    Situation: Patient chart reviewed by care coordinator.    Background: Patient enrolled in Care Coordination.    Assessment: [employer] message sent to patient for follow up. Pt has multiple care coordinators, has not had any primary care coordination needs for some time. RN offered to allow other care coordinators to provide care coordination.     Plan/Recommendations: RN will await pt's reply for further outreach.    DARIELA ADAMS RN on 12/5/2024 at 9:51 AM

## 2024-12-05 NOTE — TELEPHONE ENCOUNTER
Patient confirmed scheduled appointment:  Date: 5/7/25  Time: 10 am  Visit type: return gi  Provider: Dr. Genao  Location: virtual  Testing/imaging: NA  Additional notes: appointment added to waitlist

## 2024-12-06 ENCOUNTER — HOME INFUSION BILLING (OUTPATIENT)
Dept: HOME HEALTH SERVICES | Facility: HOME HEALTH | Age: 59
End: 2024-12-06
Payer: COMMERCIAL

## 2024-12-06 PROCEDURE — A4245 ALCOHOL WIPES PER BOX: HCPCS

## 2024-12-06 PROCEDURE — A4221 SUPP NON-INSULIN INF CATH/WK: HCPCS

## 2024-12-07 PROCEDURE — S9500 HIT ANTIBIOTIC Q24H DIEM: HCPCS

## 2024-12-08 PROCEDURE — S9500 HIT ANTIBIOTIC Q24H DIEM: HCPCS

## 2024-12-09 PROCEDURE — 99601 HOME NFS VISIT <2 HRS: CPT

## 2024-12-09 PROCEDURE — S9500 HIT ANTIBIOTIC Q24H DIEM: HCPCS

## 2024-12-10 ENCOUNTER — HOME INFUSION (OUTPATIENT)
Dept: HOME HEALTH SERVICES | Facility: HOME HEALTH | Age: 59
End: 2024-12-10
Payer: COMMERCIAL

## 2024-12-11 ENCOUNTER — TELEPHONE (OUTPATIENT)
Dept: ENDOCRINOLOGY | Facility: CLINIC | Age: 59
End: 2024-12-11

## 2024-12-11 ENCOUNTER — DOCUMENTATION ONLY (OUTPATIENT)
Dept: ANTICOAGULATION | Facility: CLINIC | Age: 59
End: 2024-12-11
Payer: COMMERCIAL

## 2024-12-11 ENCOUNTER — TELEPHONE (OUTPATIENT)
Dept: TRANSPLANT | Facility: CLINIC | Age: 59
End: 2024-12-11
Payer: COMMERCIAL

## 2024-12-11 DIAGNOSIS — Z90.410 POST-PANCREATECTOMY DIABETES (H): ICD-10-CM

## 2024-12-11 DIAGNOSIS — E10.9 TYPE 1 DIABETES MELLITUS WITHOUT COMPLICATION (H): Primary | ICD-10-CM

## 2024-12-11 DIAGNOSIS — E23.2 DIABETES INSIPIDUS (H): ICD-10-CM

## 2024-12-11 DIAGNOSIS — E89.1 POST-PANCREATECTOMY DIABETES (H): ICD-10-CM

## 2024-12-11 DIAGNOSIS — E13.9 POST-PANCREATECTOMY DIABETES (H): ICD-10-CM

## 2024-12-11 NOTE — PROGRESS NOTES
Anticoagulant Therapeutic Duplication    Duplicate orders identified: same medication but different dose, form, frequency or route    Duplicate orders appropriate-has starter pack order and ongoing therapy order     Active anticoagulant: apixaban (Eliquis)    Plan made per [facility] anticoagulation protocol.    Eleuterio Pitts RN  12/11/2024

## 2024-12-11 NOTE — TELEPHONE ENCOUNTER
Pt is requesting new rx for    Insulin glargine-yfgn 100 sopn    Did not see on active med list please verify and send new rx. Thank you!     [facility]  [phone]

## 2024-12-11 NOTE — TELEPHONE ENCOUNTER
Provider Call: General  Route to LPN    Reason for call: Danielle from [facility] called regarding rx for Levemir insulin detemir.    Levemir has been discontinued, please send rx for alternative    Call back needed? No

## 2024-12-12 ENCOUNTER — PATIENT OUTREACH (OUTPATIENT)
Dept: CARE COORDINATION | Facility: CLINIC | Age: 59
End: 2024-12-12
Payer: COMMERCIAL

## 2024-12-12 DIAGNOSIS — E10.9 TYPE 1 DIABETES MELLITUS WITHOUT COMPLICATION (H): ICD-10-CM

## 2024-12-12 DIAGNOSIS — E23.2 DIABETES INSIPIDUS (H): ICD-10-CM

## 2024-12-12 DIAGNOSIS — Z90.410 POST-PANCREATECTOMY DIABETES (H): ICD-10-CM

## 2024-12-12 DIAGNOSIS — E13.9 POST-PANCREATECTOMY DIABETES (H): ICD-10-CM

## 2024-12-12 DIAGNOSIS — E89.1 POST-PANCREATECTOMY DIABETES (H): ICD-10-CM

## 2024-12-12 PROCEDURE — 99601 HOME NFS VISIT <2 HRS: CPT

## 2024-12-12 NOTE — PROGRESS NOTES
Clinic Care Coordination Contact    Situation: Patient chart reviewed by care coordinator.    Background: Patient enrolled in care coordination.     Assessment: [employer] message sent to patient checking in after visit with PCP and other providers as requested by patient.     Plan/Recommendations: RN will await pt's reply for further outreach.     DARIELA ADAMS RN on 12/12/2024 at 12:03 PM    Addendum:  Pt replied stating that her concerns have been addressed- please see MyChart message. Pt has multiple care coordination supports with other specialties. RN will close program due to duplication of care management at this time.     DARIELA ADAMS RN on 12/12/2024 at 1:35 PM

## 2024-12-13 ENCOUNTER — HOME INFUSION BILLING (OUTPATIENT)
Dept: HOME HEALTH SERVICES | Facility: HOME HEALTH | Age: 59
End: 2024-12-13
Payer: COMMERCIAL

## 2024-12-13 PROCEDURE — A4215 STERILE NEEDLE: HCPCS

## 2024-12-16 ENCOUNTER — TRANSFERRED RECORDS (OUTPATIENT)
Dept: HEALTH INFORMATION MANAGEMENT | Facility: CLINIC | Age: 59
End: 2024-12-16
Payer: COMMERCIAL

## 2024-12-16 PROCEDURE — 99601 HOME NFS VISIT <2 HRS: CPT

## 2024-12-17 DIAGNOSIS — G43.109 MIGRAINE WITH AURA AND WITHOUT STATUS MIGRAINOSUS, NOT INTRACTABLE: ICD-10-CM

## 2024-12-17 RX ORDER — RIMEGEPANT SULFATE 75 MG/75MG
TABLET, ORALLY DISINTEGRATING ORAL
Qty: 8 TABLET | Refills: 10 | OUTPATIENT
Start: 2024-12-17

## 2024-12-18 ENCOUNTER — TELEPHONE (OUTPATIENT)
Dept: TRANSPLANT | Facility: CLINIC | Age: 59
End: 2024-12-18
Payer: COMMERCIAL

## 2024-12-18 NOTE — TELEPHONE ENCOUNTER
Called pharmacy to see what other info they need so patient can get her 0.5 unit insulin pen. Prescription is good, they did a test claim and it was approved originally.  Pharmacy will contact patient

## 2024-12-19 ENCOUNTER — VIRTUAL VISIT (OUTPATIENT)
Dept: TRANSPLANT | Facility: CLINIC | Age: 59
End: 2024-12-19
Payer: COMMERCIAL

## 2024-12-19 ENCOUNTER — HOME INFUSION BILLING (OUTPATIENT)
Dept: HOME HEALTH SERVICES | Facility: HOME HEALTH | Age: 59
End: 2024-12-19
Payer: COMMERCIAL

## 2024-12-19 DIAGNOSIS — E10.9 TYPE 1 DIABETES MELLITUS WITHOUT COMPLICATION (H): Primary | ICD-10-CM

## 2024-12-19 NOTE — LETTER
12/19/2024      Dinora Mcghee  [address] [facility] [address]      Dear Colleague,    Thank you for referring your patient, Dinora Mcghee, to the [facility]. Please see a copy of my visit note below.    Dinora is a 59 year old who is being evaluated via a billable video visit.          Video-Visit Details    Type of service:  Video Visit   Originating Location (pt. Location): Home    Distant Location (provider location):  On-site  Platform used for Video Visit: Emeli  Signed Electronically by: Gloria Melissa MD        Video start 12/19/2024, 1:01:31 pm.  Video end: 12/19/2024, 1:21:20 pm.  You were on the call for 19 minutes 49 seconds .    [facility]  Islet Autotransplant, Diabetes Follow Up    Problem List:  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     Incisional hernia, without obstruction or gangrene     Adhesive capsulitis of shoulder, unspecified laterality     S/P hernia repair     Headache, chronic migraine without aura     Chronic pain syndrome     Iron deficiency anemia     Hypoglycemia     Adult failure to thrive     Abdominal pain, generalized     Gastrostomy tube obstruction (H)     Intra-abdominal abscess (H)     Postprocedural intraabdominal abscess (H)     Acquired absence of spleen     Depressive disorder     Diabetes insipidus (H)     Other specified abnormal  immunological findings in serum     Poisoning by drug     Sphincter of Oddi dysfunction     Type 1 diabetes mellitus without complication (H)     Myofascial pain     Feeding intolerance     Acute postoperative abdominal pain     Major depression, single episode     History of food intolerance     Malnutrition, unspecified type (H)     Nausea and vomiting, unspecified vomiting type     On total parenteral nutrition (TPN)     Fever, unspecified fever cause     Chronic pancreatitis, unspecified pancreatitis type (H)     Cholangitis (H)     Abdominal pain, unspecified abdominal location     Bacteremia     Constipation     Right upper quadrant abdominal pain     S/P pancreatic islet cell transplantation (H)     Dislodged jejunostomy tube           Assessment:  1.  Post pancreatectomy diabetes mellitus, s/p total pancreatectomy and islet autotransplant.  (ICD-10 E89.1)  2.  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   Gastroparesis  4.  Nausea, vomiting      Dinora is a 59 year old with history of chronic pancreatitis who is s/p total pancreatectomy and islet autotransplant.  She has been on and off insulin with A1c in goal range in the past, but more recently has been consistently needing low dose insulin.  Major interval changes complicating diabetes management include: recent colectomy (Oct 2024), TPN use, enteral feeds.  She was also admitted with sepsis in 11/2024 with increased insulin needs then, albeit still on a low dose.    Overall she is doing very well clinically and mostly with reasonable BG control except for post prandial when she continues to run high.  She right now is on full unit pens and is rounding down for her Humalog but will be getting a yousif pen in the next few days.  We are going to have her on 0.5 unit per 10 g once she gets the yousif and round up (so for example today  she had 2.0 units for 43 grams but it will be 2.5 units with the new pen) and will continue to watch the post prandial trends.  She is also getting more active at the gym which is likely to help.    Plan:  1.  Changes to current diabetes regimen:  Patient Instructions   1)  Insulin doses:  Keep the 6 units daily for glargine (long acting)  For the Humalog, for which you will soon have the yousif 0.5 unit pen, we will do:  -- 0.5 units per 10 grams carb dosed pre-meal and round up to nearest 0.5 unit  -- 0.5 unit per 50 >150 mg/dL based on pre-meal number 3-4 times per day, as needed.  Since you are eating up to 6 times per day for your GI diet, we don't want or need to add correction for high numbers every time, because it could 'stack' the correction insulin doses and get too much on board.  You can however use the carb ratio every time you eat because there we are just matching the carbs.    2)  We will plan to follow up in clinic on Thur 4/3 with a 4:20 appointment time.    3)  Since you can now eat, I'll also have Merlyn reach out to you about the LIFT study.    4)  I don't think the side pain sounds like kidney stones, but I would keep well hydrated and go in (to PCP or else ED) if you had blood in the urine or severe uncontrolled pain.     So glad to see you doing well.  Have a happy holidays!       2.  Frequency of blood sugar checks:  Keep wearing Yandy-- this is much better for Dinora than fingersticks because with fingersticks we miss the really critical data of the post prandial excursions.    3.  Continue routine follow up for autoislet transplant patients:  Mixed meal test (6 mL/kg BoostHP to max of 360 mL) at 3 months, 6 months, and once a year post transplant.  Hemoglobin A1c levels at these time points and quarterly.    4.  Other issues addressed today:  none    Follow up:  4 mos    Contact me for questions at [phone] or [phone].  Emergency number to reach pediatric endocrinology after hours  is [phone].            Dr. Gloria Melissa MD  [facility] [place]  Director of Research, Islet Auto-transplant Program  Phone:  [phone]  Electronically signed: 2024 at 2:45 PM      40 minutes spent by me on the date of the encounter doing chart review, history and exam, documentation and further activities per the note           Review of the result(s) of each unique test - HbA1c, jim  30 minutes spent on the date of the encounter doing chart review, history and exam, documentation, downloading and reviewing CGM data,  and further activities per the note    The longitudinal plan of care for the diagnosis(es)/condition(s) as documented were addressed during this visit. Due to the added complexity in care, I will continue to support Dinora in the subsequent management and with ongoing continuity of care.        HPI:  Dinora is a 59 year old  female here for follow up oflaparoscopic assisted total pancreatectomy, islet cell autotransplant, splenectomy, gastrojejunostomy tube revision, choledochoduodenostomy, duodenojejunostomy, Vera-Y reconstruction performed on 2016.  At the time of the procedure, the patient received:  Total Islet number: [id number].  Total Islet number/k.  Islet equivalents: [id number]  Islet equivalents/kilogram: 4091    Post-surgical course was complicated by two minor procedures for a retained foreign body near GJ site in 2017 and hernia repair 2018, and severe gastroparesis (refractory to J-feeds, domperidone).  She was initially insulin independent at about 1 year after surgery.    - Gastroparesis and GI dysmotility is her major issue post TPIAT. Admitted multiple times. GJ was helpful for short time but did not tolerate well.  Has tried multiple other approaches and continues to worsen, nothing offering benefit.  Tried expanded access domperidone but not helpful  - Also has hypoglycemia (prior treatments SGLT2  inhibitor not helpful, acarbose is helpful when eating, also uses mini glucagon).   -- Multiple failures of GJ feeding tubes.  -- Most recently had total colectomy and ostomy for severe slow transit constipation 11/2024, with gram negative sepsis 11/26/24      At today's visit,   --  Since I last saw her, she was admitted on 11/26- 11/29 for a sepsis episode, admitted locally.  She did have increased insulin needs during that time.  She recovered from that and was having abdominal pain, but then the feeding tube was pulled on 12/2 and she's felt great since.  -- Oral nutrition, no TPN or tube feeds.  -- Feels great- best she has in 2 years, eating going well.  She is off ALL pain meds and all nausea meds.  She is going to the [facility] regularly but feels deconditioned, understandably.  She is reading again, read 6 books recently.   -- new concern;  Although no pain generally, she is having this new pain/discomfort that occurs on her right back/flank particularly with sneezing or laying down but not with walking.  She is concerned it could be a kidney stone.  Notably however it does get tender if her  touches or puts pressure there so sounds more muscluloskeletal in nature.         Diabetes history:  Current insulin regimen:  Glargine, 6 units daily.  Humalog, getting yousif pen -- using about 4 units per day on avg now.   For hypoglycemia she currently uses CGM for early detection plus mini dosing of glucagon as needed.   She did have a low yesterday that was 60s but not coming up that she used mini glucagon to treat.      Wearing Yandy- pattern = post prandial hyperglycemia        Recent hemoglobin A1c levels:      Lab Results   Component Value Date    A1C 5.4 07/23/2024    A1C 5.6 03/20/2024    A1C 5.4 02/17/2022    A1C 5.2 11/27/2021    A1C 5.5 09/18/2021    A1C 5.4 08/05/2021    A1C 5.7 05/12/2021    A1C 5.9 04/01/2021    A1C 5.5 12/17/2020    A1C 5.8 07/30/2020           Hypoglycemia history: mild lows but  rapid falls. The patient has had 0 episodes of severe hypoglycemia (seizure, coma, or neuroglycopenic symptoms severe enough to require assistance from another person).  Blood sugars were reviewed from the patient records and/or the meter download.          Review of systems:  A complete ROS is negative except as noted in HPI above.      Past Medical and Surgical History:  Past Medical History:   Diagnosis Date     Allergic rhinitis, cause unspecified      Allergy to other foods     strawberries, apples, celeries, alice, watermelon     Arthritis     left knee     Choledocholithiasis     long after cholecystectomy     Chronic abdominal pain      Chronic constipation      Chronic nausea      Chronic pancreatitis (H)      Degeneration of lumbar or lumbosacral intervertebral disc      Esophageal reflux     w/ hiatal hernia     Gastroparesis      Hiatal hernia      History of pituitary adenoma     s/p resection     Hypothyroidism      Migraines      Mild hyperlipidemia      On tube feeding diet     presence of GJ tube     Pancreatic disease     PD stricture, 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      PONV (postoperative nausea and vomiting)      Portacath in place      Type 1 diabetes mellitus without complication (H) 2022     Unspecified hearing loss     25% high frequency R     Past Surgical History:   Procedure Laterality Date     ABDOMEN SURGERY  1999    c sections: 93, 96, 98. endometriosis growth     APPENDECTOMY        SECTION  1993     COLONOSCOPY  2014     COLONOSCOPY N/A 2023    Procedure: COLONOSCOPY, WITH POLYPECTOMY AND BIOPSY;  Surgeon: Percy Chamorro MD;  [facility]     ENDOSCOPIC INSERTION TUBE GASTROSTOMY N/A 2021    Procedure: Gastrojejonostomy placement with jejunopexy, PEG tube exchange;  Surgeon: Zackery Montoya MD;  Location:  OR     ENDOSCOPIC RETROGRADE CHOLANGIOPANCREATOGRAM N/A 2015    Procedure: ENDOSCOPIC  RETROGRADE CHOLANGIOPANCREATOGRAM;  Surgeon: Mandeep Park MD;  [facility]     ENDOSCOPIC RETROGRADE CHOLANGIOPANCREATOGRAM N/A 02/09/2016    Procedure: COMBINED ENDOSCOPIC RETROGRADE CHOLANGIOPANCREATOGRAPHY, PLACE TUBE/STENT;  Surgeon: Mandeep Park MD;  [facility]     ENDOSCOPIC RETROGRADE CHOLANGIOPANCREATOGRAM N/A 03/17/2016    Procedure: COMBINED ENDOSCOPIC RETROGRADE CHOLANGIOPANCREATOGRAPHY, REMOVE FOREIGN BODY OR STENT/TUBE;  Surgeon: Mandeep Park MD;  [facility]     ENDOSCOPIC RETROGRADE CHOLANGIOPANCREATOGRAM N/A 08/02/2016    Procedure: COMBINED ENDOSCOPIC RETROGRADE CHOLANGIOPANCREATOGRAPHY, PLACE TUBE/STENT;  Surgeon: Mandeep Park MD;  [facility]     ENDOSCOPIC RETROGRADE CHOLANGIOPANCREATOGRAM N/A 08/26/2016    Procedure: COMBINED ENDOSCOPIC RETROGRADE CHOLANGIOPANCREATOGRAPHY, REMOVE FOREIGN BODY OR STENT/TUBE;  Surgeon: Mandeep Park MD;  [facility]     ENDOSCOPIC ULTRASOUND UPPER GASTROINTESTINAL TRACT (GI) N/A 10/03/2016    Procedure: ENDOSCOPIC ULTRASOUND, ESOPHAGOSCOPY / UPPER GASTROINTESTINAL TRACT (GI);  Surgeon: Guru Jose Rdoas MD;  Location:  OR     ENT SURGERY  1989    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     ENTEROSCOPY SMALL BOWEL N/A 02/03/2022    Procedure: ENTEROSCOPY with possible fistula closure;  Surgeon: Francisco Dodson MD;  Location:  GI     ENTEROSCOPY SMALL BOWEL N/A 9/11/2024    Procedure: Upper endoscopy, gastrostomy tube placement and jejunostomy tube exchange;  Surgeon: Zackery Montoya MD;  [facility]     ESOPHAGOSCOPY, GASTROSCOPY, DUODENOSCOPY (EGD), COMBINED N/A 06/24/2015    Procedure: COMBINED ESOPHAGOSCOPY, GASTROSCOPY, DUODENOSCOPY (EGD), REMOVE FOREIGN BODY;  Surgeon: Mandeep Park MD;  [facility]     ESOPHAGOSCOPY, GASTROSCOPY, DUODENOSCOPY (EGD), COMBINED N/A 10/25/2015    Procedure: COMBINED ESOPHAGOSCOPY, GASTROSCOPY, DUODENOSCOPY (EGD);   Surgeon: Sammy Amaro MD;  [facility]     ESOPHAGOSCOPY, GASTROSCOPY, DUODENOSCOPY (EGD), COMBINED N/A 10/25/2015    Procedure: COMBINED ESOPHAGOSCOPY, GASTROSCOPY, DUODENOSCOPY (EGD), BIOPSY SINGLE OR MULTIPLE;  Surgeon: Sammy Amaro MD;  [facility]     ESOPHAGOSCOPY, GASTROSCOPY, DUODENOSCOPY (EGD), COMBINED N/A 01/31/2023    Procedure: ESOPHAGOGASTRODUODENOSCOPY (EGD) with peg replacement ;  Surgeon: Mandeep Park MD;  [facility]     ESOPHAGOSCOPY, GASTROSCOPY, DUODENOSCOPY (EGD), COMBINED N/A 7/24/2024    Procedure: Esophagoscopy, gastroscopy, duodenoscopy (EGD), combined;  Surgeon: Zackery Montoya MD;  [facility]     ESOPHAGOSCOPY, GASTROSCOPY, DUODENOSCOPY (EGD), COMBINED N/A 10/31/2024    Procedure: Esophagoscopy, gastroscopy, duodenoscopy (EGD), combined;  Surgeon: Mandeep Park MD;  [facility]     ESOPHAGOSCOPY, GASTROSCOPY, DUODENOSCOPY (EGD), DILATATION, COMBINED       EXCISE LESION TRUNK N/A 04/17/2017    Procedure: EXCISE LESION TRUNK;  Removal of Abdominal Foreign Body;  Surgeon: Nestor Phoenix MD;  [facility]     HC ESOPH/GAS REFLUX TEST W NASAL IMPED >1 HR N/A 11/19/2015    Procedure: ESOPHAGEAL IMPEDENCE FUNCTION TEST WITH 24 HOUR PH GREATER THAN 1 HOUR;  Surgeon: Thiago Apple MD;  [facility]     HC UGI ENDOSCOPY DIAG W BIOPSY  09/17/2008     HC UGI ENDOSCOPY DIAG W BIOPSY  09/27/2012     HC UGI ENDOSCOPY W ESOPHAGEAL DILATION BALLOON <30MM  09/17/2008     HC UGI ENDOSCOPY W EUS N/A 05/05/2015    Procedure: COMBINED ENDOSCOPIC ULTRASOUND, ESOPHAGOSCOPY, GASTROSCOPY, DUODENOSCOPY (EGD);  Surgeon: Wm Dueñas MD;  [facility]     HC WRIST ARTHROSCOP,RELEASE XVERS LIG Bilateral 12/17/2008     INJECT TRANSVERSUS ABDOMINIS PLANE (TAP) BLOCK BILATERAL Left 09/22/2016    Procedure: INJECT TRANSVERSUS ABDOMINIS PLANE (TAP) BLOCK BILATERAL;  Surgeon: Dickson Corrigan MD;  [facility]     INJECT TRIGGER  POINT Bilateral 09/08/2022    Procedure: Abdominal trigger point injection with ultrasound;  Surgeon: Monika Mahajan MD;  [facility]     INJECT TRIGGER POINT SINGLE / MULTIPLE 3 OR MORE MUSCLES Right 11/15/2022    Procedure: Trigger point injections right abdomen with ultrasound guidance;  Surgeon: Monika Mahajan MD;  [facility]     IR CHEST PORT PLACEMENT < 5 YRS OF AGE  06/10/2022     IR CVC TUNNEL PLACEMENT > 5 YRS OF AGE  4/15/2024     IR PORT REMOVAL RIGHT  11/09/2023     LAPAROSCOPIC ASSISTED COLECTOMY N/A 10/11/2024    Procedure: Exploratory laparotomy, extensive lysis of adhesions, revision of Jtube and small bowel resection, TOTAL ABDOMINAL COLECTOMY WITH ILEORECTAL ANASTAMOSIS, DIVERTING LOOP ILEOSTOMY, flexible sigmoidoscopy;  Surgeon: Kaylene Branch MD;  [facility]     laparoscopic pineda  01/01/1995     LAPAROSCOPIC HERNIORRHAPHY INCISIONAL N/A 08/23/2018    Procedure: LAPAROSCOPIC HERNIORRHAPHY INCISIONAL;  Laparoscopic Incisional Hernia Repair with Symbotex Mesh Implant;  Surgeon: Nestor Phoenix MD;  [facility]     LAPAROSCOPIC PANCREATECTOMY, TRANSPLANT AUTO ISLET CELL N/A 12/28/2016    Procedure: LAPAROSCOPIC PANCREATECTOMY, TRANSPLANT AUTO ISLET CELL;  Surgeon: Nestor Phoenix MD;  [facility]     LAPAROTOMY EXPLORATORY N/A 01/31/2023    Procedure: Exploratory Laparotomy, lysis of adhesions, Perforated J-Junostomy Resection, Replace J-Junostomy site;  Surgeon: Elver Bauer MD;  [facility]     LAPAROTOMY, LYSIS ADHESIONS, COMBINED N/A 01/31/2023    Procedure: Dilatation of jejunostomy feeding tube, track with placement of jejunostomy tube with fluoroscopy;  Surgeon: Elver aBuer MD;  [facility]     PICC DOUBLE LUMEN PLACEMENT Left 11/13/2023    Basilic Vein 5F DL 43 cm     REMOVE AND REPLACE BREAST IMPLANT PROSTHESIS N/A 12/30/2022    Procedure: Exploratory Laparotomy, lysis of adhesions, small bowel resection. Placement of  gastric jejunostomy for enteral feeding.;  Surgeon: Elver Bauer MD;  [facility]     REMOVE GASTROSTOMY TUBE ADULT N/A 01/28/2022    Procedure: REMOVAL, GASTROSTOMY TUBE, ADULT;  Surgeon: Mandeep Park MD;  [facility]     REMOVE GASTROSTOMY TUBE ADULT N/A 12/2/2024    Procedure: Remove gastrostomy tube adult- PEG;  Surgeon: Mandeep Park MD;  [facility]     REPLACE GASTROJEJUNOSTOMY TUBE, PERCUTANEOUS N/A 09/07/2021    Procedure: GASTROJEJUNOSTOMY TUBE PLACEMENT, PERCUTANEOUS, WITH GASTROPEXY;  Surgeon: Mandeep Park MD;  [facility]     REPLACE GASTROJEJUNOSTOMY TUBE, PERCUTANEOUS N/A 09/22/2021    Procedure: REPLACEMENT, GASTROJEJUNOSTOMY TUBE, PERCUTANEOUS;  Surgeon: Zackery Montoya MD;  [facility]     REPLACE GASTROJEJUNOSTOMY TUBE, PERCUTANEOUS N/A 11/22/2022    Procedure: REPLACEMENT, GASTROJEJUNOSTOMY TUBE, PERCUTANEOUS;  Surgeon: Mandeep Park MD;  [facility]     REPLACE GASTROJEJUNOSTOMY TUBE, PERCUTANEOUS N/A 12/09/2022    Procedure: REPLACEMENT, GASTROJEJUNOSTOMY TUBE, PERCUTANEOUS with ENDOSCOPIC SUTURING.;  Surgeon: Mandeep Park MD;  [facility]     REPLACE GASTROJEJUNOSTOMY TUBE, PERCUTANEOUS N/A 08/01/2023    Procedure: Replace Gastrojejunostomy Tube, Percutaneous;  Surgeon: Mandeep Park MD;  [facility]     REPLACE GASTROJEJUNOSTOMY TUBE, PERCUTANEOUS N/A 11/11/2023    Procedure: Replace Gastrojejunostomy Tube, Percutaneous;  Surgeon: Francisco Dodson MD;  [facility]     REPLACE GASTROJEJUNOSTOMY TUBE, PERCUTANEOUS N/A 12/8/2023    Procedure: Replace Gastrojejunostomy Tube, Percutaneous;  Surgeon: Mandeep Park MD;  [facility]     REPLACE GASTROSTOMY TUBE, PERCUTANEOUS N/A 10/31/2024    Procedure: REPLACEMENT, GASTROSTOMY TUBE, PERCUTANEOUS;  Surgeon: Mandeep Park MD;  [facility]     REPLACE JEJUNOSTOMY TUBE, PERCUTANEOUS N/A 09/10/2021    Procedure: UPPER  ENDOSCOPY, REPLACEMENT OF PERCUTANEOUS GASTROJEJUNOSTOMY TUBE, T-TAG GASTROPEXY;  Surgeon: Zackery Montoya MD;  [facility]     REPLACE JEJUNOSTOMY TUBE, PERCUTANEOUS N/A 2021    Procedure: REPLACEMENT, JEJUNOSTOMY TUBE, PERCUTANEOUS;  Surgeon: Mandeep Park MD;  [facility]     REPLACE JEJUNOSTOMY TUBE, PERCUTANEOUS N/A 2023    Procedure: REPLACEMENT, JEJUNOSTOMY TUBE, PERCUTANEOUS;  Surgeon: Mandeep Park MD;  [facility]     REPLACE JEJUNOSTOMY TUBE, PERCUTANEOUS  2023    Procedure: Replace Jejunostomy Tube, Percutaneous;  Surgeon: Mandeep Park MD;  [facility]     REPLACE JEJUNOSTOMY TUBE, PERCUTANEOUS N/A 2024    Procedure: REPLACEMENT, JEJUNOSTOMY TUBE, PERCUTANEOUS;  Surgeon: Francisco Dodson MD;  [facility]     REPLACE JEJUNOSTOMY TUBE, PERCUTANEOUS N/A 10/31/2024    Procedure: REPLACEMENT, JEJUNOSTOMY TUBE, PERCUTANEOUS;  Surgeon: Mandeep Park MD;  [facility]     transphenoidal pituitary resection  1990     [facility]  DELIVERY ONLY  1996     [facility]  DELIVERY ONLY  1998    repeat c section with incidental cystotomy with repair     [facility] EXCIS PITUITARY,TRANSNASAL/SEPTAL  1980    pituitary tumor removed for diabetes insipidus     [facility] TOTAL ABDOM HYSTERECTOMY  1999    w/ bilateral salpingoophorectomy        Family History:  New changes since last visit:  none  Family History   Problem Relation Age of Onset     Lipids Mother      Hypertension Mother      Thyroid Disease Mother      Depression Mother      Angina Mother      GERD Mother      Skin Cancer Mother      Migraines Mother      Autoimmune Disease Mother      Hyperlipidemia Mother      Mental Illness Mother      Cerebrovascular Disease Mother         2023     Other Cancer Mother         skin-basil cell     Anxiety Disorder Mother      Post Operative Nausea and Vomiting Mother      Eye Disorder Father         cataract, detached  retina     Myocardial Infarction Father 60     Lipids Father      Cerebrovascular Disease Father      Depression Father      Substance Abuse Father      Anesthesia Reaction Father         stroke right after surgery     Cataracts Father      Osteoarthritis Father      Ulcerative Colitis Father      Autoimmune Disease Father      Heart Disease Father      Hyperlipidemia Father      Mental Illness Father      Other Cancer Father         myloma     Anxiety Disorder Father      Interpersonal Violence Sister      Coronary Artery Disease Sister         angioplasty     GERD Sister      Substance Abuse Sister      Cerebrovascular Disease Sister         2005     Depression Sister      Thyroid Disease Sister      Autoimmune Disease Sister      Depression Sister      Mental Illness Sister      Substance Abuse Sister      Thyroid Disease Sister      Eye Disorder Maternal Grandmother         cataract     Thyroid Disease Maternal Grandmother      Diabetes Maternal Grandfather      Eye Disorder Paternal Grandmother         cataract     Diabetes Paternal Grandmother      Eye Disorder Paternal Grandfather         cataract     Diabetes Paternal Grandfather      Substance Abuse Paternal Grandfather      Eye Disorder Son         ptosis     Depression Son      Anxiety Disorder Son      Depression Son      Mental Illness Son      Anxiety Disorder Son      Heart Disease Paternal Aunt      Diabetes Paternal Aunt      Diabetes Paternal Uncle      Heart Disease Paternal Uncle      Depression Nephew      Anxiety Disorder Nephew      Thyroid Disease Nephew      Thyroid Disease Nephew      Anxiety Disorder Nephew      Diabetes Type 2  Cousin         paternal cousin     Autoimmune Disease Cousin        Social History:  Social History     Social History Narrative     with 3 children and a dog.  No smoking, etoh or drug use.  Worked as a  for [employer] in [place] in the past.  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  BMI= There is no height or weight on file to calculate BMI.  General:  Appearance is normal, no acute distress  HEENT:  NC/AT, sclera appear white  Neck:  No obvious thyromegaly  CV/Lungs:  Non distressed breathing  Skin:  No apparent rashes  Neuro:  Normal mental status  Psych:  Normal affect      Again, thank you for allowing me to participate in the care of your patient.        Sincerely,        Gloria Melissa MD

## 2024-12-19 NOTE — PATIENT INSTRUCTIONS
1)  Insulin doses:  Keep the 6 units daily for glargine (long acting)  For the Humalog, for which you will soon have the yousif 0.5 unit pen, we will do:  -- 0.5 units per 10 grams carb dosed pre-meal and round up to nearest 0.5 unit  -- 0.5 unit per 50 >150 mg/dL based on pre-meal number 3-4 times per day, as needed.  Since you are eating up to 6 times per day for your GI diet, we don't want or need to add correction for high numbers every time, because it could 'stack' the correction insulin doses and get too much on board.  You can however use the carb ratio every time you eat because there we are just matching the carbs.    2)  We will plan to follow up in clinic on Thur 4/3 with a 4:20 appointment time.    3)  Since you can now eat, I'll also have Merlyn reach out to you about the LIFT study.    4)  I don't think the side pain sounds like kidney stones, but I would keep well hydrated and go in (to PCP or else ED) if you had blood in the urine or severe uncontrolled pain.     So glad to see you doing well.  Have a happy holidays!

## 2024-12-19 NOTE — PROGRESS NOTES
Dinora is a 59 year old who is being evaluated via a billable video visit.          Video-Visit Details    Type of service:  Video Visit   Originating Location (pt. Location): Home    Distant Location (provider location):  On-site  Platform used for Video Visit: [employer]  Signed Electronically by: Gloria Melissa MD        Video start 12/19/2024, 1:01:31 pm.  Video end: 12/19/2024, 1:21:20 pm.  You were on the call for 19 minutes 49 seconds .    [facility]  Islet Autotransplant, Diabetes Follow Up    Problem List:  Patient Active Problem List   Diagnosis    Hypothyroidism    Need for prophylactic immunotherapy    Sprain and strain of other specified sites of hip and thigh    Chondromalacia of patella    Sensorineural hearing loss    Vertiginous syndrome and labyrinthine disorder    Lumbago    Allergic rhinitis due to other allergen    GERD (gastroesophageal reflux disease)    Other type of intractable migraine    History of ERCP    Abdominal pain    S/P ERCP    Post-pancreatectomy diabetes (H)    Pancreatic insufficiency    ACP (advance care planning)    Gastroparesis    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    Incisional hernia, without obstruction or gangrene    Adhesive capsulitis of shoulder, unspecified laterality    S/P hernia repair    Headache, chronic migraine without aura    Chronic pain syndrome    Iron deficiency anemia    Hypoglycemia    Adult failure to thrive    Abdominal pain, generalized    Gastrostomy tube obstruction (H)    Intra-abdominal abscess (H)    Postprocedural intraabdominal abscess (H)    Acquired absence of spleen    Depressive disorder    Diabetes insipidus (H)    Other specified abnormal immunological findings in serum    Poisoning by drug    Sphincter of Oddi dysfunction    Type 1 diabetes mellitus without complication (H)    Myofascial pain    Feeding intolerance    Acute postoperative abdominal pain    Major depression, single episode    History of food  intolerance    Malnutrition, unspecified type (H)    Nausea and vomiting, unspecified vomiting type    On total parenteral nutrition (TPN)    Fever, unspecified fever cause    Chronic pancreatitis, unspecified pancreatitis type (H)    Cholangitis (H)    Abdominal pain, unspecified abdominal location    Bacteremia    Constipation    Right upper quadrant abdominal pain    S/P pancreatic islet cell transplantation (H)    Dislodged jejunostomy tube           Assessment:  1.  Post pancreatectomy diabetes mellitus, s/p total pancreatectomy and islet autotransplant.  (ICD-10 E89.1)  2.  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   Gastroparesis  4.  Nausea, vomiting      Dinora is a 59 year old with history of chronic pancreatitis who is s/p total pancreatectomy and islet autotransplant.  She has been on and off insulin with A1c in goal range in the past, but more recently has been consistently needing low dose insulin.  Major interval changes complicating diabetes management include: recent colectomy (Oct 2024), TPN use, enteral feeds.  She was also admitted with sepsis in 11/2024 with increased insulin needs then, albeit still on a low dose.    Overall she is doing very well clinically and mostly with reasonable BG control except for post prandial when she continues to run high.  She right now is on full unit pens and is rounding down for her Humalog but will be getting a yousif pen in the next few days.  We are going to have her on 0.5 unit per 10 g once she gets the yousif and round up (so for example today she had 2.0 units for 43 grams but it will be 2.5 units with the new pen) and will continue to watch the post prandial trends.  She is also getting more active at the gym which is likely to help.    Plan:  1.  Changes to current diabetes regimen:  Patient Instructions   1)  Insulin  doses:  Keep the 6 units daily for glargine (long acting)  For the Humalog, for which you will soon have the yousif 0.5 unit pen, we will do:  -- 0.5 units per 10 grams carb dosed pre-meal and round up to nearest 0.5 unit  -- 0.5 unit per 50 >150 mg/dL based on pre-meal number 3-4 times per day, as needed.  Since you are eating up to 6 times per day for your GI diet, we don't want or need to add correction for high numbers every time, because it could 'stack' the correction insulin doses and get too much on board.  You can however use the carb ratio every time you eat because there we are just matching the carbs.    2)  We will plan to follow up in clinic on Thur 4/3 with a 4:20 appointment time.    3)  Since you can now eat, I'll also have Merlyn reach out to you about the LIFT study.    4)  I don't think the side pain sounds like kidney stones, but I would keep well hydrated and go in (to PCP or else ED) if you had blood in the urine or severe uncontrolled pain.     So glad to see you doing well.  Have a happy holidays!       2.  Frequency of blood sugar checks:  Keep wearing Yandy-- this is much better for Dinora than fingersticks because with fingersticks we miss the really critical data of the post prandial excursions.    3.  Continue routine follow up for autoislet transplant patients:  Mixed meal test (6 mL/kg BoostHP to max of 360 mL) at 3 months, 6 months, and once a year post transplant.  Hemoglobin A1c levels at these time points and quarterly.    4.  Other issues addressed today:  none    Follow up:  4 mos    Contact me for questions at [phone] or [phone].  Emergency number to reach pediatric endocrinology after hours is [phone].            Dr. Gloria Melissa MD  [facility] [place]  Director of Research, Islet Auto-transplant Program  Phone:  [phone]  Electronically signed: December 19, 2024 at 2:45 PM      40 minutes spent by me on the  date of the encounter doing chart review, history and exam, documentation and further activities per the note           Review of the result(s) of each unique test - HbA1c, jim  30 minutes spent on the date of the encounter doing chart review, history and exam, documentation, downloading and reviewing CGM data,  and further activities per the note    The longitudinal plan of care for the diagnosis(es)/condition(s) as documented were addressed during this visit. Due to the added complexity in care, I will continue to support Dinora in the subsequent management and with ongoing continuity of care.        HPI:  Dinora is a 59 year old  female here for follow up oflaparoscopic assisted total pancreatectomy, islet cell autotransplant, splenectomy, gastrojejunostomy tube revision, choledochoduodenostomy, duodenojejunostomy, Vera-Y reconstruction performed on 2016.  At the time of the procedure, the patient received:  Total Islet number: [id number].  Total Islet number/k.  Islet equivalents: [id number]  Islet equivalents/kilogram: 4091    Post-surgical course was complicated by two minor procedures for a retained foreign body near GJ site in 2017 and hernia repair 2018, and severe gastroparesis (refractory to J-feeds, domperidone).  She was initially insulin independent at about 1 year after surgery.    - Gastroparesis and GI dysmotility is her major issue post TPIAT. Admitted multiple times. GJ was helpful for short time but did not tolerate well.  Has tried multiple other approaches and continues to worsen, nothing offering benefit.  Tried expanded access domperidone but not helpful  - Also has hypoglycemia (prior treatments SGLT2 inhibitor not helpful, acarbose is helpful when eating, also uses mini glucagon).   -- Multiple failures of GJ feeding tubes.  -- Most recently had total colectomy and ostomy for severe slow transit constipation 2024, with gram negative sepsis 24      At today's visit,   --   Since I last saw her, she was admitted on 11/26- 11/29 for a sepsis episode, admitted locally.  She did have increased insulin needs during that time.  She recovered from that and was having abdominal pain, but then the feeding tube was pulled on 12/2 and she's felt great since.  -- Oral nutrition, no TPN or tube feeds.  -- Feels great- best she has in 2 years, eating going well.  She is off ALL pain meds and all nausea meds.  She is going to the [facility] regularly but feels deconditioned, understandably.  She is reading again, read 6 books recently.   -- new concern;  Although no pain generally, she is having this new pain/discomfort that occurs on her right back/flank particularly with sneezing or laying down but not with walking.  She is concerned it could be a kidney stone.  Notably however it does get tender if her  touches or puts pressure there so sounds more muscluloskeletal in nature.         Diabetes history:  Current insulin regimen:  Glargine, 6 units daily.  Humalog, getting yousif pen -- using about 4 units per day on avg now.   For hypoglycemia she currently uses CGM for early detection plus mini dosing of glucagon as needed.   She did have a low yesterday that was 60s but not coming up that she used mini glucagon to treat.      Wearing Yandy- pattern = post prandial hyperglycemia        Recent hemoglobin A1c levels:      Lab Results   Component Value Date    A1C 5.4 07/23/2024    A1C 5.6 03/20/2024    A1C 5.4 02/17/2022    A1C 5.2 11/27/2021    A1C 5.5 09/18/2021    A1C 5.4 08/05/2021    A1C 5.7 05/12/2021    A1C 5.9 04/01/2021    A1C 5.5 12/17/2020    A1C 5.8 07/30/2020           Hypoglycemia history: mild lows but rapid falls. The patient has had 0 episodes of severe hypoglycemia (seizure, coma, or neuroglycopenic symptoms severe enough to require assistance from another person).  Blood sugars were reviewed from the patient records and/or the meter download.          Review of systems:  A  complete ROS is negative except as noted in HPI above.      Past Medical and Surgical History:  Past Medical History:   Diagnosis Date    Allergic rhinitis, cause unspecified     Allergy to other foods     strawberries, apples, celeries, alice, watermelon    Arthritis     left knee    Choledocholithiasis     long after cholecystectomy    Chronic abdominal pain     Chronic constipation     Chronic nausea     Chronic pancreatitis (H)     Degeneration of lumbar or lumbosacral intervertebral disc     Esophageal reflux     w/ hiatal hernia    Gastroparesis     Hiatal hernia     History of pituitary adenoma     s/p resection    Hypothyroidism     Migraines     Mild hyperlipidemia     On tube feeding diet     presence of GJ tube    Pancreatic disease     PD stricture, 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 endometriosis growth    APPENDECTOMY       SECTION  1993    COLONOSCOPY  2014    COLONOSCOPY N/A 2023    Procedure: COLONOSCOPY, WITH POLYPECTOMY AND BIOPSY;  Surgeon: Percy Chamorro MD;  [facility]    ENDOSCOPIC INSERTION TUBE GASTROSTOMY N/A 2021    Procedure: Gastrojejonostomy placement with jejunopexy, PEG tube exchange;  Surgeon: Zackery Montoya MD;  [facility]    ENDOSCOPIC RETROGRADE CHOLANGIOPANCREATOGRAM N/A 2015    Procedure: ENDOSCOPIC RETROGRADE CHOLANGIOPANCREATOGRAM;  Surgeon: Mandeep Park MD;  [facility]    ENDOSCOPIC RETROGRADE CHOLANGIOPANCREATOGRAM N/A 2016    Procedure: COMBINED ENDOSCOPIC RETROGRADE CHOLANGIOPANCREATOGRAPHY, PLACE TUBE/STENT;  Surgeon: Mandeep Park MD;  [facility]    ENDOSCOPIC  RETROGRADE CHOLANGIOPANCREATOGRAM N/A 03/17/2016    Procedure: COMBINED ENDOSCOPIC RETROGRADE CHOLANGIOPANCREATOGRAPHY, REMOVE FOREIGN BODY OR STENT/TUBE;  Surgeon: Mandeep Park MD;  [facility]    ENDOSCOPIC RETROGRADE CHOLANGIOPANCREATOGRAM N/A 08/02/2016    Procedure: COMBINED ENDOSCOPIC RETROGRADE CHOLANGIOPANCREATOGRAPHY, PLACE TUBE/STENT;  Surgeon: Mandeep Park MD;  [facility]    ENDOSCOPIC RETROGRADE CHOLANGIOPANCREATOGRAM N/A 08/26/2016    Procedure: COMBINED ENDOSCOPIC RETROGRADE CHOLANGIOPANCREATOGRAPHY, REMOVE FOREIGN BODY OR STENT/TUBE;  Surgeon: Mandeep Park MD;  [facility]    ENDOSCOPIC ULTRASOUND UPPER GASTROINTESTINAL TRACT (GI) N/A 10/03/2016    Procedure: ENDOSCOPIC ULTRASOUND, ESOPHAGOSCOPY / UPPER GASTROINTESTINAL TRACT (GI);  Surgeon: Guru Jose Rodas MD;  [facility]    ENT SURGERY  1989    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    ENTEROSCOPY SMALL BOWEL N/A 02/03/2022    Procedure: ENTEROSCOPY with possible fistula closure;  Surgeon: Francisco Dodson MD;  Location:  GI    ENTEROSCOPY SMALL BOWEL N/A 9/11/2024    Procedure: Upper endoscopy, gastrostomy tube placement and jejunostomy tube exchange;  Surgeon: Zackery Montoya MD;  Location:  OR    ESOPHAGOSCOPY, GASTROSCOPY, DUODENOSCOPY (EGD), COMBINED N/A 06/24/2015    Procedure: COMBINED ESOPHAGOSCOPY, GASTROSCOPY, DUODENOSCOPY (EGD), REMOVE FOREIGN BODY;  Surgeon: Mandeep Park MD;  Location:  GI    ESOPHAGOSCOPY, GASTROSCOPY, DUODENOSCOPY (EGD), COMBINED N/A 10/25/2015    Procedure: COMBINED ESOPHAGOSCOPY, GASTROSCOPY, DUODENOSCOPY (EGD);  Surgeon: Sammy Amaro MD;  Location:  GI    ESOPHAGOSCOPY, GASTROSCOPY, DUODENOSCOPY (EGD), COMBINED N/A 10/25/2015    Procedure: COMBINED ESOPHAGOSCOPY, GASTROSCOPY, DUODENOSCOPY (EGD), BIOPSY SINGLE OR MULTIPLE;  Surgeon: Sammy Amaro MD;  Location:  GI    ESOPHAGOSCOPY, GASTROSCOPY, DUODENOSCOPY  (EGD), COMBINED N/A 01/31/2023    Procedure: ESOPHAGOGASTRODUODENOSCOPY (EGD) with peg replacement ;  Surgeon: Mandeep Park MD;  [facility]    ESOPHAGOSCOPY, GASTROSCOPY, DUODENOSCOPY (EGD), COMBINED N/A 7/24/2024    Procedure: Esophagoscopy, gastroscopy, duodenoscopy (EGD), combined;  Surgeon: Zackery Montoya MD;  [facility]    ESOPHAGOSCOPY, GASTROSCOPY, DUODENOSCOPY (EGD), COMBINED N/A 10/31/2024    Procedure: Esophagoscopy, gastroscopy, duodenoscopy (EGD), combined;  Surgeon: Mandeep Park MD;  [facility]    ESOPHAGOSCOPY, GASTROSCOPY, DUODENOSCOPY (EGD), DILATATION, COMBINED      EXCISE LESION TRUNK N/A 04/17/2017    Procedure: EXCISE LESION TRUNK;  Removal of Abdominal Foreign Body;  Surgeon: Nestor Phoenix MD;  [facility]    HC ESOPH/GAS REFLUX TEST W NASAL IMPED >1 HR N/A 11/19/2015    Procedure: ESOPHAGEAL IMPEDENCE FUNCTION TEST WITH 24 HOUR PH GREATER THAN 1 HOUR;  Surgeon: Thiago Apple MD;  [facility]    HC UGI ENDOSCOPY DIAG W BIOPSY  09/17/2008    HC UGI ENDOSCOPY DIAG W BIOPSY  09/27/2012    HC UGI ENDOSCOPY W ESOPHAGEAL DILATION BALLOON <30MM  09/17/2008    HC UGI ENDOSCOPY W EUS N/A 05/05/2015    Procedure: COMBINED ENDOSCOPIC ULTRASOUND, ESOPHAGOSCOPY, GASTROSCOPY, DUODENOSCOPY (EGD);  Surgeon: Wm Dueñas MD;  [facility]    HC WRIST ARTHROSCOP,RELEASE XVERS LIG Bilateral 12/17/2008    INJECT TRANSVERSUS ABDOMINIS PLANE (TAP) BLOCK BILATERAL Left 09/22/2016    Procedure: INJECT TRANSVERSUS ABDOMINIS PLANE (TAP) BLOCK BILATERAL;  Surgeon: Dickson Corrigan MD;  [facility]    INJECT TRIGGER POINT Bilateral 09/08/2022    Procedure: Abdominal trigger point injection with ultrasound;  Surgeon: Monika Mahajan MD;  [facility]    INJECT TRIGGER POINT SINGLE / MULTIPLE 3 OR MORE MUSCLES Right 11/15/2022    Procedure: Trigger point injections right abdomen with ultrasound guidance;  Surgeon: Monika Mahajan MD;  [facility]    IR CHEST PORT PLACEMENT < 5 YRS OF AGE  06/10/2022    IR CVC TUNNEL PLACEMENT > 5 YRS OF AGE  4/15/2024    IR PORT REMOVAL RIGHT  11/09/2023    LAPAROSCOPIC ASSISTED COLECTOMY N/A 10/11/2024    Procedure: Exploratory laparotomy, extensive lysis of adhesions, revision of Jtube and small bowel resection, TOTAL ABDOMINAL COLECTOMY WITH ILEORECTAL ANASTAMOSIS, DIVERTING LOOP ILEOSTOMY, flexible sigmoidoscopy;  Surgeon: Kaylene Branch MD;  [facility]    laparoscopic pineda  01/01/1995    LAPAROSCOPIC HERNIORRHAPHY INCISIONAL N/A 08/23/2018    Procedure: LAPAROSCOPIC HERNIORRHAPHY INCISIONAL;  Laparoscopic Incisional Hernia Repair with Symbotex Mesh Implant;  Surgeon: Nestor Phoenix MD;  [facility]    LAPAROSCOPIC PANCREATECTOMY, TRANSPLANT AUTO ISLET CELL N/A 12/28/2016    Procedure: LAPAROSCOPIC PANCREATECTOMY, TRANSPLANT AUTO ISLET CELL;  Surgeon: Nestor Phoenix MD;  [facility]    LAPAROTOMY EXPLORATORY N/A 01/31/2023    Procedure: Exploratory Laparotomy, lysis of adhesions, Perforated J-Junostomy Resection, Replace J-Junostomy site;  Surgeon: Elver Bauer MD;  [facility]    LAPAROTOMY, LYSIS ADHESIONS, COMBINED N/A 01/31/2023    Procedure: Dilatation of jejunostomy feeding tube, track with placement of jejunostomy tube with fluoroscopy;  Surgeon: Elver Bauer MD;  [facility]    PICC DOUBLE LUMEN PLACEMENT Left 11/13/2023    Basilic Vein 5F DL 43 cm    REMOVE AND REPLACE BREAST IMPLANT PROSTHESIS N/A 12/30/2022    Procedure: Exploratory Laparotomy, lysis of adhesions, small bowel resection. Placement of gastric jejunostomy for enteral feeding.;  Surgeon: Elver Bauer MD;  [facility]    REMOVE GASTROSTOMY TUBE ADULT N/A 01/28/2022    Procedure: REMOVAL, GASTROSTOMY TUBE, ADULT;  Surgeon: Mandeep Park MD;  [facility]    REMOVE GASTROSTOMY TUBE ADULT N/A 12/2/2024    Procedure: Remove gastrostomy tube adult- PEG;   Surgeon: Mandeep Park MD;  [facility]    REPLACE GASTROJEJUNOSTOMY TUBE, PERCUTANEOUS N/A 09/07/2021    Procedure: GASTROJEJUNOSTOMY TUBE PLACEMENT, PERCUTANEOUS, WITH GASTROPEXY;  Surgeon: Mandeep Park MD;  [facility]    REPLACE GASTROJEJUNOSTOMY TUBE, PERCUTANEOUS N/A 09/22/2021    Procedure: REPLACEMENT, GASTROJEJUNOSTOMY TUBE, PERCUTANEOUS;  Surgeon: Zackery Montoya MD;  [facility]    REPLACE GASTROJEJUNOSTOMY TUBE, PERCUTANEOUS N/A 11/22/2022    Procedure: REPLACEMENT, GASTROJEJUNOSTOMY TUBE, PERCUTANEOUS;  Surgeon: Mandeep Park MD;  [facility]    REPLACE GASTROJEJUNOSTOMY TUBE, PERCUTANEOUS N/A 12/09/2022    Procedure: REPLACEMENT, GASTROJEJUNOSTOMY TUBE, PERCUTANEOUS with ENDOSCOPIC SUTURING.;  Surgeon: Mandeep Park MD;  [facility]    REPLACE GASTROJEJUNOSTOMY TUBE, PERCUTANEOUS N/A 08/01/2023    Procedure: Replace Gastrojejunostomy Tube, Percutaneous;  Surgeon: Mandeep Park MD;  [facility]    REPLACE GASTROJEJUNOSTOMY TUBE, PERCUTANEOUS N/A 11/11/2023    Procedure: Replace Gastrojejunostomy Tube, Percutaneous;  Surgeon: Francisco Dodson MD;  [facility]    REPLACE GASTROJEJUNOSTOMY TUBE, PERCUTANEOUS N/A 12/8/2023    Procedure: Replace Gastrojejunostomy Tube, Percutaneous;  Surgeon: Mandeep Park MD;  [facility]    REPLACE GASTROSTOMY TUBE, PERCUTANEOUS N/A 10/31/2024    Procedure: REPLACEMENT, GASTROSTOMY TUBE, PERCUTANEOUS;  Surgeon: Mandeep Park MD;  [facility]    REPLACE JEJUNOSTOMY TUBE, PERCUTANEOUS N/A 09/10/2021    Procedure: UPPER ENDOSCOPY, REPLACEMENT OF PERCUTANEOUS GASTROJEJUNOSTOMY TUBE, T-TAG GASTROPEXY;  Surgeon: Zackery Montoya MD;  [facility]    REPLACE JEJUNOSTOMY TUBE, PERCUTANEOUS N/A 12/29/2021    Procedure: REPLACEMENT, JEJUNOSTOMY TUBE, PERCUTANEOUS;  Surgeon: Mandeep Park MD;  [facility]    REPLACE JEJUNOSTOMY TUBE, PERCUTANEOUS N/A  2023    Procedure: REPLACEMENT, JEJUNOSTOMY TUBE, PERCUTANEOUS;  Surgeon: Mandeep Park MD;  [facility]    REPLACE JEJUNOSTOMY TUBE, PERCUTANEOUS  2023    Procedure: Replace Jejunostomy Tube, Percutaneous;  Surgeon: Mandeep Park MD;  [facility]    REPLACE JEJUNOSTOMY TUBE, PERCUTANEOUS N/A 2024    Procedure: REPLACEMENT, JEJUNOSTOMY TUBE, PERCUTANEOUS;  Surgeon: Francisco Dodson MD;  [facility]    REPLACE JEJUNOSTOMY TUBE, PERCUTANEOUS N/A 10/31/2024    Procedure: REPLACEMENT, JEJUNOSTOMY TUBE, PERCUTANEOUS;  Surgeon: Mandeep Park MD;  [facility]    transphenoidal pituitary resection  1990    [facility]  DELIVERY ONLY  1996    [facility]  DELIVERY ONLY  1998    repeat c section with incidental cystotomy with repair    Z EXCIS PITUITARY,TRANSNASAL/SEPTAL  1980    pituitary tumor removed for diabetes insipidus    [facility] TOTAL ABDOM HYSTERECTOMY  1999    w/ bilateral salpingoophorectomy        Family History:  New changes since last visit:  none  Family History   Problem Relation Age of Onset    Lipids Mother     Hypertension Mother     Thyroid Disease Mother     Depression Mother     Angina Mother     GERD Mother     Skin Cancer Mother     Migraines Mother     Autoimmune Disease Mother     Hyperlipidemia Mother     Mental Illness Mother     Cerebrovascular Disease Mother         2023    Other Cancer Mother         skin-basil cell    Anxiety Disorder Mother     Post Operative Nausea and Vomiting Mother     Eye Disorder Father         cataract, detached retina    Myocardial Infarction Father 60    Lipids Father     Cerebrovascular Disease Father     Depression Father     Substance Abuse Father     Anesthesia Reaction Father         stroke right after surgery    Cataracts Father     Osteoarthritis Father     Ulcerative Colitis Father     Autoimmune Disease Father     Heart Disease Father     Hyperlipidemia Father     Mental  Illness Father     Other Cancer Father         myloma    Anxiety Disorder Father     Interpersonal Violence Sister     Coronary Artery Disease Sister         angioplasty    GERD Sister     Substance Abuse Sister     Cerebrovascular Disease Sister         2005    Depression Sister     Thyroid Disease Sister     Autoimmune Disease Sister     Depression Sister     Mental Illness Sister     Substance Abuse Sister     Thyroid Disease Sister     Eye Disorder Maternal Grandmother         cataract    Thyroid Disease Maternal Grandmother     Diabetes Maternal Grandfather     Eye Disorder Paternal Grandmother         cataract    Diabetes Paternal Grandmother     Eye Disorder Paternal Grandfather         cataract    Diabetes Paternal Grandfather     Substance Abuse Paternal Grandfather     Eye Disorder Son         ptosis    Depression Son     Anxiety Disorder Son     Depression Son     Mental Illness Son     Anxiety Disorder Son     Heart Disease Paternal Aunt     Diabetes Paternal Aunt     Diabetes Paternal Uncle     Heart Disease Paternal Uncle     Depression Nephew     Anxiety Disorder Nephew     Thyroid Disease Nephew     Thyroid Disease Nephew     Anxiety Disorder Nephew     Diabetes Type 2  Cousin         paternal cousin    Autoimmune Disease Cousin        Social History:  Social History     Social History Narrative     with 3 children and a dog.  No smoking, etoh or drug use.  Worked as a  for [employer] in [place] in the past.  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  BMI= There is no height or weight on file to calculate BMI.  General:  Appearance is normal, no acute distress  HEENT:  NC/AT, sclera appear white  Neck:  No obvious thyromegaly  CV/Lungs:  Non distressed breathing  Skin:  No apparent rashes  Neuro:  Normal  mental status  Psych:  Normal affect

## 2024-12-23 ENCOUNTER — OFFICE VISIT (OUTPATIENT)
Dept: WOUND CARE | Facility: CLINIC | Age: 59
End: 2024-12-23
Payer: COMMERCIAL

## 2024-12-23 DIAGNOSIS — K59.00 CONSTIPATION: ICD-10-CM

## 2024-12-23 DIAGNOSIS — Z93.2 ILEOSTOMY STATUS (H): Primary | ICD-10-CM

## 2024-12-23 DIAGNOSIS — K59.01 SLOW TRANSIT CONSTIPATION: ICD-10-CM

## 2024-12-23 PROCEDURE — 99601 HOME NFS VISIT <2 HRS: CPT

## 2024-12-23 NOTE — PROGRESS NOTES
"Ostomy Consult   Referral from Dr. Branch   Consult attended by patient   Diagnosis: Slow transit visit constipation date of Surgery: 10/11/2024  Exploratory laparotomy, extensive lysis of adhesions, revision of J-tube and small bowel resection, total abdominal colectomy with ileorectal anastomosis and loop ileostomy. Flexible sigmoidoscopy.   Subjective:She is here with by herself and ostomy care is provided by self   Patient's reason for visit: \" gas and high output\"     The quantity of ostomy supplies needed by a beneficiary is determined primarily by the type of ostomy, its location, its construction, and the condition of the skin surface surrounding the stoma.    Objective:  Type: Ileostomy since 10/22/2024  Stoma: 22 mm  end healthy, normal-appearing, round, flabby, and protuberant   Location: right lower quadrant     Output: soft    Peristomal skin: intact  and barrier is intact after 3 days wear time  Frequency of pouch change: twice weekly. This is scheduled.   Received home care WOC assessment after discharge:Yes  Ordering supplies from:  [employer]     Current pouch system/supplies: [place]   two piece, cut to fit, flat, and barrier ring    Assessment: Patient is doing very well with her pouch changes.  She has experienced some leaking from behind the barrier because her stoma appears to be retracting occasionally.  She still uses the high output pouches during the night and has problems with gas even though she has increased her Creon try to avoid dairy and is using   Lactaid supplements. There is slight erythema around the stoma     Intervention/Plan: Recommend to use ams AG nondairy protein supplements to drink increase the Lactaid supplements when she has products with dairy in them.  Recommend she really looks at the labels of her food items made recommendation for the [place] EZ vent to help gas escape the bag.  Also discuss with GI the possibility of small intestinal bacterial overgrowth that " can be contributing to gas as well.  Due to increase leaking I recommend she start using the soft convex [place] barrier.  Rx signed by Raeann Ortiz NP.   Continue with high output pouch 44094.  Since the soft convex only half tape at the edges and I worry a little bit of tape sensitivity so if she does not tolerate it we needs this project to [employer].    Return to clinic 3 week(s)  . Treated and follow-up appointment made, Raeann Ortiz NP  was available for supervision of care if needed or if questions should arise and regarding plan of care. Aleyda Ceja, RN  RN CWON

## 2024-12-26 ENCOUNTER — DOCUMENTATION ONLY (OUTPATIENT)
Dept: INTERNAL MEDICINE | Facility: CLINIC | Age: 59
End: 2024-12-26
Payer: COMMERCIAL

## 2024-12-26 NOTE — PROGRESS NOTES
Type of Form Received: [facility] 1628    Form Received (Date) 12/23/24   Form Filled out No   Placed in provider folder Yes

## 2024-12-26 NOTE — PROGRESS NOTES
Type of Form Received: [facility] 1627    Form Received (Date) 12/23/24   Form Filled out No   Placed in provider folder Yes

## 2024-12-27 ENCOUNTER — HOME INFUSION BILLING (OUTPATIENT)
Dept: HOME HEALTH SERVICES | Facility: HOME HEALTH | Age: 59
End: 2024-12-27
Payer: COMMERCIAL

## 2024-12-29 ENCOUNTER — MYC MEDICAL ADVICE (OUTPATIENT)
Dept: INTERNAL MEDICINE | Facility: CLINIC | Age: 59
End: 2024-12-29
Payer: COMMERCIAL

## 2024-12-29 ENCOUNTER — HEALTH MAINTENANCE LETTER (OUTPATIENT)
Age: 59
End: 2024-12-29

## 2024-12-30 ENCOUNTER — MEDICAL CORRESPONDENCE (OUTPATIENT)
Dept: HEALTH INFORMATION MANAGEMENT | Facility: CLINIC | Age: 59
End: 2024-12-30
Payer: COMMERCIAL

## 2024-12-30 DIAGNOSIS — Z53.9 DIAGNOSIS NOT YET DEFINED: Primary | ICD-10-CM

## 2024-12-30 PROCEDURE — 99601 HOME NFS VISIT <2 HRS: CPT

## 2025-01-02 ENCOUNTER — HOME INFUSION (OUTPATIENT)
Dept: HOME HEALTH SERVICES | Facility: HOME HEALTH | Age: 60
End: 2025-01-02

## 2025-01-02 ENCOUNTER — HOME INFUSION (OUTPATIENT)
Dept: HOME HEALTH SERVICES | Facility: HOME HEALTH | Age: 60
End: 2025-01-02
Payer: COMMERCIAL

## 2025-01-02 DIAGNOSIS — K31.84 GASTROPARESIS: Primary | ICD-10-CM

## 2025-01-03 ENCOUNTER — HOME INFUSION BILLING (OUTPATIENT)
Dept: HOME HEALTH SERVICES | Facility: HOME HEALTH | Age: 60
End: 2025-01-03
Payer: COMMERCIAL

## 2025-01-03 ENCOUNTER — TELEPHONE (OUTPATIENT)
Dept: INTERNAL MEDICINE | Facility: CLINIC | Age: 60
End: 2025-01-03
Payer: COMMERCIAL

## 2025-01-03 NOTE — TELEPHONE ENCOUNTER
Left Voicemail (1st Attempt) and Sent Mychart (1st Attempt) for the patient to call back and schedule the following:    Appointment type: [facility] Physical  Provider: Dr. Gomez  Return date: 3/21/25 reschedule to next available  Specialty phone number: [phone]

## 2025-01-06 PROCEDURE — 99601 HOME NFS VISIT <2 HRS: CPT

## 2025-01-07 ASSESSMENT — ANXIETY QUESTIONNAIRES
7. FEELING AFRAID AS IF SOMETHING AWFUL MIGHT HAPPEN: NOT AT ALL
IF YOU CHECKED OFF ANY PROBLEMS ON THIS QUESTIONNAIRE, HOW DIFFICULT HAVE THESE PROBLEMS MADE IT FOR YOU TO DO YOUR WORK, TAKE CARE OF THINGS AT HOME, OR GET ALONG WITH OTHER PEOPLE: NOT DIFFICULT AT ALL
5. BEING SO RESTLESS THAT IT IS HARD TO SIT STILL: SEVERAL DAYS
GAD7 TOTAL SCORE: 2
6. BECOMING EASILY ANNOYED OR IRRITABLE: NOT AT ALL
1. FEELING NERVOUS, ANXIOUS, OR ON EDGE: NOT AT ALL
GAD7 TOTAL SCORE: 2
GAD7 TOTAL SCORE: 2
8. IF YOU CHECKED OFF ANY PROBLEMS, HOW DIFFICULT HAVE THESE MADE IT FOR YOU TO DO YOUR WORK, TAKE CARE OF THINGS AT HOME, OR GET ALONG WITH OTHER PEOPLE?: NOT DIFFICULT AT ALL
2. NOT BEING ABLE TO STOP OR CONTROL WORRYING: NOT AT ALL
3. WORRYING TOO MUCH ABOUT DIFFERENT THINGS: NOT AT ALL
4. TROUBLE RELAXING: SEVERAL DAYS
7. FEELING AFRAID AS IF SOMETHING AWFUL MIGHT HAPPEN: NOT AT ALL

## 2025-01-07 NOTE — PROGRESS NOTES
Dinora is a 59 year old who is being evaluated via a billable video visit.    How would you like to obtain your AVS? MyChart  If the video visit is dropped, the invitation should be resent by: Text to cell phone: [phone]  Will anyone else be joining your video visit? No      Assessment & Plan     Anxiety    She did not tolerate the Wellbutrin, found it too stimulating, so she stopped about 4 days ago.  Her mood is doing much better even without it as her general health is much improved.  We will continue her other current medications.     Adult failure to thrive  and  History of vascular access device    She is accessing a tunneled line 3 times per week and is interested in switching to a port.  Referral to IR placed.     - IR Referral; Future      Subjective   Dinora is a 59 year old, presenting for the following health issues:  RECHECK and Depression    History of Present Illness       Mental Health Follow-up:  Patient presents to follow-up on Depression & Anxiety.Patient's depression since last visit has been:  Good  The patient is not having other symptoms associated with depression.  Patient's anxiety since last visit has been:  Good  The patient is not having other symptoms associated with anxiety.  Any significant life events: grief or loss and health concerns  Patient is not feeling anxious or having panic attacks.  Patient has no concerns about alcohol or drug use.    Reason for visit:  Check in on PE/ blood thinner, depression medication and inquire about a port vs central line    She eats 4 or more servings of fruits and vegetables daily.She consumes 3 sweetened beverage(s) daily.She exercises with enough effort to increase her heart rate 30 to 60 minutes per day.  She exercises with enough effort to increase her heart rate 4 days per week.   She is taking medications regularly.       I saw Dinora last on 12/6 for hospital follow-up for sepsis.  She was having worsened anxiety and sleep.  Trazodone  and hydroxyzine were not helping, so she planned to stop them.  We continued 60mg amitriptyline and 60mg of duloxetine that she was already on and added 150mg of bupropion.    We discussed starting an OTC zinc supplement and recheck level in a couple of weeks and this lab is ordered but not yet drawn.     Today, Dinora reports that she is feeling great.  She is not having any pain.  The Wellbutrin was too stimulating so she stopped it.  She is still having some fatigue [place] [facility]. She is not having much shortness of breath but feels winded on exertion.  She has having some dizziness with standing, worse on Wellbutrin, getting better since stopping it 4 days ago. She is trying to stay hydrated but is doing LR every 3 days.       Sleep has been good the past few nights.  Mood has been better.     She wants to touch base on blood thinner plan- she has been on Eliquis since having a PE in November.  We had discussed three months duration last visit and she'd like to continue with that plan.      She wants to discuss port vs central line.  She is accessing 3 weeks. She has a tunneled central line and is wondering about getting a port instead as the line gets in the way.            Objective    Vitals - Patient Reported  Weight (Patient Reported): 60.8 kg (134 lb)  Pain Score: No Pain (0)      Vitals:  No vitals were obtained today due to virtual visit.    Physical Exam   GENERAL: alert and no distress  EYES: Eyes grossly normal to inspection.  No discharge or erythema, or obvious scleral/conjunctival abnormalities.  RESP: No audible wheeze, cough, or visible cyanosis.    SKIN: Visible skin clear. No significant rash, abnormal pigmentation or lesions.  NEURO: Cranial nerves grossly intact.  Mentation and speech appropriate for age.  PSYCH: Appropriate affect, tone, and pace of words        Video-Visit Details    Type of service:  Video Visit   Start time: 1200pm  End time: 1214pm  Originating  Location (pt. Location): Home    Distant Location (provider location):  On-site  Platform used for Video Visit: Emeli  Signed Electronically by: Juan Jose Gomez MD

## 2025-01-08 ENCOUNTER — VIRTUAL VISIT (OUTPATIENT)
Dept: INTERNAL MEDICINE | Facility: CLINIC | Age: 60
End: 2025-01-08
Payer: COMMERCIAL

## 2025-01-08 DIAGNOSIS — R62.7 ADULT FAILURE TO THRIVE: ICD-10-CM

## 2025-01-08 DIAGNOSIS — Z98.890 HISTORY OF VASCULAR ACCESS DEVICE: ICD-10-CM

## 2025-01-08 DIAGNOSIS — F41.9 ANXIETY: Primary | ICD-10-CM

## 2025-01-08 ASSESSMENT — PATIENT HEALTH QUESTIONNAIRE - PHQ9: SUM OF ALL RESPONSES TO PHQ QUESTIONS 1-9: 5

## 2025-01-08 NOTE — PATIENT INSTRUCTIONS
Thank you for visiting the Primary Care Center today at the [facility]! The following is some information about our clinic:     Primary Care Center Frequently-Asked Questions    (1) How do I schedule appointments at the [facility]?     Primary Care--to schedule or make changes to an existing appointment, please call our primary care line at [phone].    Labs--to schedule a lab appointment at the [facility] you can use [employer] or call [phone]. If you have a [place] location that is closer to home, you can reach out to that location for scheduling options.     Imaging--if you need to schedule a CT, X-ray, MRI, ultrasound, or other imaging study you can call [phone] to schedule at the [facility] or any other [facility] imaging location.     Referrals--if a referral to another specialty was ordered you can expect a phone call from their scheduling team. If you have not heard from them in a week, please call us or send us a [employer]elTree message to check the status or get a scheduling number. Please note that this only applies to internal [facility] referrals. If the referral is external you would need to contact their office for scheduling.     (2) I have a question about my visit, who do I contact?     You can call us at the primary care line at [phone] to ask questions about your visit. You can also send a secure message through [employer]elTree, which is reviewed by clinic staff. Please note that [employer] messages have a twenty-four to forty-eight business hour turnaround time and should not be used for urgent concerns.    (3) How will I get the results of my tests?    If you are signed up for iCoucht all tests will be released to you within twenty-four hours of resulting. Please allow three to five days for your doctor to review your results and place a note interpreting the results. If you do not have Redu.ushart you will receive your  results through mail seven to ten business days following the return of the tests. Please note that if there should be any urgent or concerning results that your doctor or their registered nurse will reach out to you the same day as the tests come back. If you have follow up questions about your results or would like to discuss the results in detail please schedule a follow up with your provider either in person or virtually.     (4) How do I get refills of my prescriptions?     You should always first contact your pharmacy for refills of your medications. If submitting a refill request on [employer], please be sure to submit the request only once--repeat requests can cause delays in refill. If you are requesting a NEW medication or a medication related to new symptoms you will need to schedule an appointment with a provider prior to approval. Please note: Routine medication refills have up to one to three business day turnaround whereas controlled substances refills have up to five to seven business day turnaround.    (5) I have new symptoms, what do I do?     If you are having an immediate medical emergency, you should dial 911 for assistance.   For anything urgent that needs to be seen within a few hours to one day you should visit a local urgent care for assistance.  For non-urgent symptoms that need to be seen within a few days to a week you can schedule with an available provider in primary care by going to eCollect or calling [phone].   If you are not sure how serious your symptoms are or you would like to receive medical advice you can always call [phone] to speak with a triage nurse.

## 2025-01-09 ENCOUNTER — HOME INFUSION BILLING (OUTPATIENT)
Dept: HOME HEALTH SERVICES | Facility: HOME HEALTH | Age: 60
End: 2025-01-09
Payer: COMMERCIAL

## 2025-01-09 PROCEDURE — A4245 ALCOHOL WIPES PER BOX: HCPCS

## 2025-01-09 PROCEDURE — A9270 NON-COVERED ITEM OR SERVICE: HCPCS

## 2025-01-10 NOTE — PROGRESS NOTES
"Ostomy Consult   Referral from Dr. Branch   Consult attended by patient   Diagnosis: Slow transit visit constipation date of Surgery: 10/11/2024  Exploratory laparotomy, extensive lysis of adhesions, revision of J-tube and small bowel resection, total abdominal colectomy with ileorectal anastomosis and loop ileostomy. Flexible sigmoidoscopy.   Subjective:She is here with by herself and ostomy care is provided by self   Patient's reason for visit: \" gas and high output especially at night\"     The quantity of ostomy supplies needed by a beneficiary is determined primarily by the type of ostomy, its location, its construction, and the condition of the skin surface surrounding the stoma.    Objective:  Type: Ileostomy since 10/22/2024  Stoma: 25 mm  end healthy, normal-appearing, round, flabby, and protuberant   Location: right lower quadrant     Output: soft    Peristomal skin: intact  and barrier is intact after 3 days wear time  Frequency of pouch change: twice weekly. This is scheduled.   Received home care WOC assessment after discharge:Yes  Ordering supplies from:  [employer]     Current pouch system/supplies: [place]   two piece, cut to fit, soft convex, and cuauhtemoc barrier ring    Assessment: Patient is doing very well with her pouch changes.    Leaking now minimal (stoma appears to be retracting occasionally.)  Uses the high output pouches during the night and still has a lot of gas even though she has increased her Creon try to avoids all dairy and is using [place] non dairy protein supplement  Lactaid supplements.   There is no erythema around the stoma  Now using Due to increase leaking I recommend she start using the soft convex [place] barrier.       Intervention/Plan:   Consider GasX to control gas  recommendation for the [place] EZ vent to help gas escape the bag.   Discuss with GI the possibility of small intestinal bacterial overgrowth that can be contributing to gas as well.    Due to increase " leaking I recommend she start using the soft convex Leandro barrier.    Continue with high output pouch 30946.  Not reacting to tape      Return to clinic PRN  . Treated and follow-up appointment made, Raeann Ortiz NP  was available for supervision of care if needed or if questions should arise and regarding plan of care. Aleyda Ceja, RN  RN CWON

## 2025-01-13 ENCOUNTER — OFFICE VISIT (OUTPATIENT)
Dept: WOUND CARE | Facility: CLINIC | Age: 60
End: 2025-01-13
Payer: COMMERCIAL

## 2025-01-13 DIAGNOSIS — Z93.2 ILEOSTOMY STATUS (H): ICD-10-CM

## 2025-01-13 DIAGNOSIS — K59.00 CONSTIPATION: ICD-10-CM

## 2025-01-13 DIAGNOSIS — E73.9 LACTOSE INTOLERANCE IN ADULT: ICD-10-CM

## 2025-01-13 DIAGNOSIS — K59.01 SLOW TRANSIT CONSTIPATION: Primary | ICD-10-CM

## 2025-01-14 PROCEDURE — 99601 HOME NFS VISIT <2 HRS: CPT

## 2025-01-16 ENCOUNTER — HOME INFUSION BILLING (OUTPATIENT)
Dept: HOME HEALTH SERVICES | Facility: HOME HEALTH | Age: 60
End: 2025-01-16
Payer: COMMERCIAL

## 2025-01-16 DIAGNOSIS — K86.89 PANCREATIC INSUFFICIENCY: ICD-10-CM

## 2025-01-20 PROCEDURE — 99601 HOME NFS VISIT <2 HRS: CPT

## 2025-01-21 DIAGNOSIS — Z00.6 RESEARCH STUDY PATIENT: Primary | ICD-10-CM

## 2025-01-23 ENCOUNTER — HOME INFUSION BILLING (OUTPATIENT)
Dept: HOME HEALTH SERVICES | Facility: HOME HEALTH | Age: 60
End: 2025-01-23
Payer: COMMERCIAL

## 2025-01-23 PROCEDURE — A9270 NON-COVERED ITEM OR SERVICE: HCPCS

## 2025-01-23 RX ORDER — PANCRELIPASE 24000; 76000; 120000 [USP'U]/1; [USP'U]/1; [USP'U]/1
CAPSULE, DELAYED RELEASE PELLETS ORAL
Qty: 150 CAPSULE | Refills: 11 | Status: SHIPPED | OUTPATIENT
Start: 2025-01-23

## 2025-01-23 NOTE — TELEPHONE ENCOUNTER
Last Written Prescription:lipase-protease-amylase (CREON) [id number] units CPEP per EC [id number]/22/2023--No  Sig - Route: Take 3-4 capsules by mouth 3 times daily (with meals) With oral meals - Oral     CREON [id number] CPEP      Last Office Visit : 11/20/24 Viskocil  Future Office visit:  5/7/25  Procedure 12/2/24  Routing refill request to provider for review/approval because:   Non-protocol medication lipase-protease-amylase (CREON) [id number] units CPEP per EC capsule   Refill decision: Medication unable to be refilled by RN due to:        [x]  Medication not included in refill protocol policy. No policy for CREON         Request from pharmacy:  Requested Prescriptions   Pending Prescriptions Disp Refills    CREON [id number] units CPEP per EC capsule [Pharmacy Med Name: CREON [id number] CPEP] 150 capsule 11     Sig: TAKE 3-4 CAPSULES BY MOUTH THREE TIMES A DAY WITH MEALS.       There is no refill protocol information for this order

## 2025-01-24 ENCOUNTER — MYC MEDICAL ADVICE (OUTPATIENT)
Dept: INTERVENTIONAL RADIOLOGY/VASCULAR | Facility: CLINIC | Age: 60
End: 2025-01-24
Payer: COMMERCIAL

## 2025-01-25 PROCEDURE — S9379 HIT NOC PER DIEM: HCPCS

## 2025-01-26 PROCEDURE — S9379 HIT NOC PER DIEM: HCPCS

## 2025-01-27 PROCEDURE — 99601 HOME NFS VISIT <2 HRS: CPT

## 2025-01-27 PROCEDURE — S9379 HIT NOC PER DIEM: HCPCS

## 2025-01-28 ENCOUNTER — DOCUMENTATION ONLY (OUTPATIENT)
Dept: OTHER | Facility: CLINIC | Age: 60
End: 2025-01-28
Payer: COMMERCIAL

## 2025-01-28 PROBLEM — Z71.89 ACP (ADVANCE CARE PLANNING): Chronic | Status: RESOLVED | Noted: 2017-03-28 | Resolved: 2025-01-28

## 2025-01-28 PROCEDURE — S9379 HIT NOC PER DIEM: HCPCS

## 2025-01-28 ASSESSMENT — HEADACHE IMPACT TEST (HIT 6)
HOW OFTEN DID HEADACHS LIMIT CONCENTRATION ON WORK OR DAILY ACTIVITY: RARELY
HIT6 TOTAL SCORE: 62
HOW OFTEN HAVE YOU FELT FED UP OR IRRITATED BECAUSE OF YOUR HEADACHES: SOMETIMES
HOW OFTEN DO HEADACHES LIMIT YOUR DAILY ACTIVITIES: SOMETIMES
WHEN YOU HAVE A HEADACHE HOW OFTEN DO YOU WISH YOU COULD LIE DOWN: ALWAYS
HOW OFTEN HAVE YOU FELT TOO TIRED TO WORK BECAUSE OF YOUR HEADACHES: SOMETIMES
WHEN YOU HAVE HEADACHES HOW OFTEN IS THE PAIN SEVERE: VERY OFTEN

## 2025-01-29 ENCOUNTER — HOSPITAL ENCOUNTER (OUTPATIENT)
Facility: CLINIC | Age: 60
Discharge: HOME | End: 2025-01-29
Attending: RADIOLOGY | Admitting: RADIOLOGY
Payer: COMMERCIAL

## 2025-01-29 ENCOUNTER — APPOINTMENT (OUTPATIENT)
Dept: MEDSURG UNIT | Facility: CLINIC | Age: 60
End: 2025-01-29
Attending: RADIOLOGY
Payer: COMMERCIAL

## 2025-01-29 ENCOUNTER — APPOINTMENT (OUTPATIENT)
Dept: INTERVENTIONAL RADIOLOGY/VASCULAR | Facility: CLINIC | Age: 60
End: 2025-01-29
Attending: PHYSICIAN ASSISTANT
Payer: COMMERCIAL

## 2025-01-29 VITALS
OXYGEN SATURATION: 100 % | RESPIRATION RATE: 16 BRPM | DIASTOLIC BLOOD PRESSURE: 64 MMHG | HEART RATE: 78 BPM | TEMPERATURE: 97.9 F | WEIGHT: 140.8 LBS | BODY MASS INDEX: 21.34 KG/M2 | SYSTOLIC BLOOD PRESSURE: 121 MMHG | HEIGHT: 68 IN

## 2025-01-29 DIAGNOSIS — Z98.890 HISTORY OF VASCULAR ACCESS DEVICE: ICD-10-CM

## 2025-01-29 LAB
ANION GAP SERPL CALCULATED.3IONS-SCNC: 9 MMOL/L (ref 7–15)
BUN SERPL-MCNC: 9.4 MG/DL (ref 6–20)
CALCIUM SERPL-MCNC: 8.9 MG/DL (ref 8.8–10.4)
CHLORIDE SERPL-SCNC: 102 MMOL/L (ref 98–107)
CREAT SERPL-MCNC: 0.81 MG/DL (ref 0.51–0.95)
EGFRCR SERPLBLD CKD-EPI 2021: 84 ML/MIN/1.73M2
ERYTHROCYTE [DISTWIDTH] IN BLOOD BY AUTOMATED COUNT: 16.1 % (ref 10–15)
GLUCOSE SERPL-MCNC: 100 MG/DL (ref 70–99)
HCO3 SERPL-SCNC: 25 MMOL/L (ref 22–29)
HCT VFR BLD AUTO: 32 % (ref 35–47)
HGB BLD-MCNC: 9.4 G/DL (ref 11.7–15.7)
INR PPP: 1.06 (ref 0.85–1.15)
MCH RBC QN AUTO: 24.2 PG (ref 26.5–33)
MCHC RBC AUTO-ENTMCNC: 29.4 G/DL (ref 31.5–36.5)
MCV RBC AUTO: 83 FL (ref 78–100)
PLATELET # BLD AUTO: 544 10E3/UL (ref 150–450)
POTASSIUM SERPL-SCNC: 4.6 MMOL/L (ref 3.4–5.3)
RBC # BLD AUTO: 3.88 10E6/UL (ref 3.8–5.2)
SODIUM SERPL-SCNC: 136 MMOL/L (ref 135–145)
WBC # BLD AUTO: 6 10E3/UL (ref 4–11)

## 2025-01-29 PROCEDURE — 250N000009 HC RX 250

## 2025-01-29 PROCEDURE — 80048 BASIC METABOLIC PNL TOTAL CA: CPT | Performed by: NURSE PRACTITIONER

## 2025-01-29 PROCEDURE — S9379 HIT NOC PER DIEM: HCPCS

## 2025-01-29 PROCEDURE — 250N000011 HC RX IP 250 OP 636

## 2025-01-29 PROCEDURE — 36589 REMOVAL TUNNELED CV CATH: CPT | Mod: 4MD | Performed by: RADIOLOGY

## 2025-01-29 PROCEDURE — 999N000248 HC STATISTIC IV INSERT WITH US BY RN

## 2025-01-29 PROCEDURE — 36591 DRAW BLOOD OFF VENOUS DEVICE: CPT | Performed by: NURSE PRACTITIONER

## 2025-01-29 PROCEDURE — C1788 PORT, INDWELLING, IMP: HCPCS

## 2025-01-29 PROCEDURE — 999N000132 HC STATISTIC PP CARE STAGE 1

## 2025-01-29 PROCEDURE — 36561 INSERT TUNNELED CV CATH: CPT | Performed by: RADIOLOGY

## 2025-01-29 PROCEDURE — 77001 FLUOROGUIDE FOR VEIN DEVICE: CPT | Mod: 26 | Performed by: RADIOLOGY

## 2025-01-29 PROCEDURE — 85610 PROTHROMBIN TIME: CPT | Performed by: NURSE PRACTITIONER

## 2025-01-29 PROCEDURE — C1769 GUIDE WIRE: HCPCS

## 2025-01-29 PROCEDURE — 36589 REMOVAL TUNNELED CV CATH: CPT

## 2025-01-29 PROCEDURE — 999N000285 HC STATISTIC VASC ACCESS LAB DRAW WITH PIV START

## 2025-01-29 PROCEDURE — 76937 US GUIDE VASCULAR ACCESS: CPT

## 2025-01-29 PROCEDURE — 82310 ASSAY OF CALCIUM: CPT | Performed by: NURSE PRACTITIONER

## 2025-01-29 PROCEDURE — 250N000011 HC RX IP 250 OP 636: Mod: JZ | Performed by: NURSE PRACTITIONER

## 2025-01-29 PROCEDURE — 272N000192 HC ACCESSORY CR2

## 2025-01-29 PROCEDURE — 85018 HEMOGLOBIN: CPT | Performed by: NURSE PRACTITIONER

## 2025-01-29 PROCEDURE — 999N000142 HC STATISTIC PROCEDURE PREP ONLY

## 2025-01-29 PROCEDURE — 76937 US GUIDE VASCULAR ACCESS: CPT | Mod: 26 | Performed by: RADIOLOGY

## 2025-01-29 RX ORDER — NALOXONE HYDROCHLORIDE 0.4 MG/ML
0.4 INJECTION, SOLUTION INTRAMUSCULAR; INTRAVENOUS; SUBCUTANEOUS
Status: DISCONTINUED | OUTPATIENT
Start: 2025-01-29 | End: 2025-01-29 | Stop reason: HOSPADM

## 2025-01-29 RX ORDER — NALOXONE HYDROCHLORIDE 0.4 MG/ML
0.2 INJECTION, SOLUTION INTRAMUSCULAR; INTRAVENOUS; SUBCUTANEOUS
Status: DISCONTINUED | OUTPATIENT
Start: 2025-01-29 | End: 2025-01-29 | Stop reason: HOSPADM

## 2025-01-29 RX ORDER — LIDOCAINE 40 MG/G
CREAM TOPICAL
Status: DISCONTINUED | OUTPATIENT
Start: 2025-01-29 | End: 2025-01-29 | Stop reason: HOSPADM

## 2025-01-29 RX ORDER — HEPARIN SODIUM,PORCINE 10 UNIT/ML
5-10 VIAL (ML) INTRAVENOUS EVERY 24 HOURS
Status: DISCONTINUED | OUTPATIENT
Start: 2025-01-29 | End: 2025-01-29 | Stop reason: HOSPADM

## 2025-01-29 RX ORDER — FENTANYL CITRATE 50 UG/ML
25-50 INJECTION, SOLUTION INTRAMUSCULAR; INTRAVENOUS EVERY 5 MIN PRN
Status: DISCONTINUED | OUTPATIENT
Start: 2025-01-29 | End: 2025-01-29 | Stop reason: HOSPADM

## 2025-01-29 RX ORDER — HEPARIN SODIUM (PORCINE) LOCK FLUSH IV SOLN 100 UNIT/ML 100 UNIT/ML
5-10 SOLUTION INTRAVENOUS
Status: DISCONTINUED | OUTPATIENT
Start: 2025-01-29 | End: 2025-01-29 | Stop reason: HOSPADM

## 2025-01-29 RX ORDER — DEXTROSE MONOHYDRATE 25 G/50ML
25-50 INJECTION, SOLUTION INTRAVENOUS
Status: DISCONTINUED | OUTPATIENT
Start: 2025-01-29 | End: 2025-01-29 | Stop reason: HOSPADM

## 2025-01-29 RX ORDER — HEPARIN SODIUM,PORCINE 10 UNIT/ML
5-10 VIAL (ML) INTRAVENOUS
Status: DISCONTINUED | OUTPATIENT
Start: 2025-01-29 | End: 2025-01-29 | Stop reason: HOSPADM

## 2025-01-29 RX ORDER — HEPARIN SODIUM (PORCINE) LOCK FLUSH IV SOLN 100 UNIT/ML 100 UNIT/ML
5 SOLUTION INTRAVENOUS ONCE
Status: COMPLETED | OUTPATIENT
Start: 2025-01-29 | End: 2025-01-29

## 2025-01-29 RX ORDER — CEFAZOLIN SODIUM 2 G/100ML
2 INJECTION, SOLUTION INTRAVENOUS
Status: COMPLETED | OUTPATIENT
Start: 2025-01-29 | End: 2025-01-29

## 2025-01-29 RX ORDER — HEPARIN SODIUM,PORCINE 10 UNIT/ML
5-10 VIAL (ML) INTRAVENOUS ONCE
Status: DISCONTINUED | OUTPATIENT
Start: 2025-01-29 | End: 2025-01-29

## 2025-01-29 RX ORDER — FLUMAZENIL 0.1 MG/ML
0.2 INJECTION, SOLUTION INTRAVENOUS
Status: DISCONTINUED | OUTPATIENT
Start: 2025-01-29 | End: 2025-01-29 | Stop reason: HOSPADM

## 2025-01-29 RX ORDER — NICOTINE POLACRILEX 4 MG
15-30 LOZENGE BUCCAL
Status: DISCONTINUED | OUTPATIENT
Start: 2025-01-29 | End: 2025-01-29 | Stop reason: HOSPADM

## 2025-01-29 RX ADMIN — MIDAZOLAM 0.5 MG: 1 INJECTION INTRAMUSCULAR; INTRAVENOUS at 15:24

## 2025-01-29 RX ADMIN — CEFAZOLIN SODIUM 2 G: 2 INJECTION, SOLUTION INTRAVENOUS at 12:51

## 2025-01-29 RX ADMIN — HEPARIN SODIUM (PORCINE) LOCK FLUSH IV SOLN 100 UNIT/ML 5 ML: 100 SOLUTION at 15:49

## 2025-01-29 RX ADMIN — FENTANYL CITRATE 25 MCG: 50 INJECTION, SOLUTION INTRAMUSCULAR; INTRAVENOUS at 15:31

## 2025-01-29 RX ADMIN — MIDAZOLAM 0.5 MG: 1 INJECTION INTRAMUSCULAR; INTRAVENOUS at 15:31

## 2025-01-29 RX ADMIN — FENTANYL CITRATE 50 MCG: 50 INJECTION, SOLUTION INTRAMUSCULAR; INTRAVENOUS at 15:12

## 2025-01-29 RX ADMIN — PROCHLORPERAZINE EDISYLATE 5 MG: 5 INJECTION INTRAMUSCULAR; INTRAVENOUS at 14:43

## 2025-01-29 RX ADMIN — FENTANYL CITRATE 25 MCG: 50 INJECTION, SOLUTION INTRAMUSCULAR; INTRAVENOUS at 15:24

## 2025-01-29 RX ADMIN — LIDOCAINE HYDROCHLORIDE 15 ML: 10 INJECTION, SOLUTION EPIDURAL; INFILTRATION; INTRACAUDAL; PERINEURAL at 15:29

## 2025-01-29 RX ADMIN — MIDAZOLAM 1 MG: 1 INJECTION INTRAMUSCULAR; INTRAVENOUS at 15:12

## 2025-01-29 ASSESSMENT — ACTIVITIES OF DAILY LIVING (ADL)
ADLS_ACUITY_SCORE: 62

## 2025-01-29 NOTE — PRE-PROCEDURE
GENERAL PRE-PROCEDURE:   Procedure:  Removal of tunneled venous catheter, Placement of port  Date/Time:  1/29/2025 12:44 PM    Written consent obtained?: Yes    Risks and benefits: Risks, benefits and alternatives were discussed    Consent given by:  Patient  Patient states understanding of procedure being performed: Yes    Patient's understanding of procedure matches consent: Yes    Procedure consent matches procedure scheduled: Yes    Expected level of sedation:  Moderate  Appropriately NPO:  Yes  ASA Class:  2  Mallampati  :  Grade 1- soft palate, uvula, tonsillar pillars, and posterior pharyngeal wall visible  Lungs:  Lungs clear with good breath sounds bilaterally  Heart:  Normal heart sounds and rate  History & Physical reviewed:  History and physical reviewed and no updates needed  Statement of review:  I have reviewed the lab findings, diagnostic data, medications, and the plan for sedation    Peyton Franklin MD  PGY-3 IR/

## 2025-01-29 NOTE — PROGRESS NOTES
Patient arrived to unit 2a for tunnel port removal and insertion of of a new cvc port. Alert and oriented, no c/o pain, vss. Consented, h&p present. Awaiting on lab results.  julio cesar in the gold waiting room. IV abx started, IR MD is aware that pt took their Eliquis last night.

## 2025-01-29 NOTE — PROVIDER NOTIFICATION
Dinora reported feeling 'slightly naseous' preprocedure. She says this is not uncommon and that IV compazine and scopolamine patches in the past have worked.  Notified IR MD room  Took verbal order for 5mg IV compazine and gave that.  Monitor for N/V

## 2025-01-29 NOTE — IR NOTE
"Patient Name: Dinora Mcghee  Medical Record Number: [id number]  Today's Date: 1/29/2025    Procedure: Chest port placement, tunneled central venous catheter removal   Proceduralist: Dr. Dowell  Pathology present: N/A    Procedure Start: 1513  Procedure end: 1552  Sedation medications administered: Midazolam 2 mg, Fentanyl 100 mcg     Report given to: MONICA Mera 2A  : N/A    Other Notes: Pt arrived to IR room 2 from . Consent reviewed. Pt denies any questions or concerns regarding procedure. Pt positioned supine and monitored per protocol.     8 Fr port placed in the right chest. Placement verified with imaging and ready for immediate use.     Port left accessed (per pt request, ok per Dr. Dowell) and locked with 5 mL of heparin (100 units/mL). 3/4\", 20g needle. Infection prevention discussed with patient and education reinforced.     Pt tolerated procedure without any noted complications. Pt transferred back to .    "

## 2025-01-29 NOTE — DISCHARGE INSTRUCTIONS
[facility]        Interventional Radiology  Discharge Instructions  Following Port Placement  You had a port placed. A port is a small medical device that is placed under the skin and is connected to a vein with a catheter (thin, flexible tube). Ports can be used to administer IV medications (including chemotherapy), fluids or blood products or for blood lab draws. Please follow the below instructions after your procedure:    Your Port has been closed with  Absorbable suture  If after 10 days there are visible suture they may be trimmed by your primary doctor  Derma Conde (Skin Glue)    If there is any oozing or bleeding from the site, apply direct pressure for 5-10 minutes with a gauze pad.  If bleeding continues after 10 minutes call your primary doctor.  If bleeding cannot be controlled with direct pressure, call 911.    If you experience the following seek medical evaluation:    Uncontrolled bleeding from port site  Fever (greater than 100 )  Purulent (yellow/green/foul smelling) drainage from port insertion site  Increasing pain at port site  Increasing redness at port site  Increasing swelling    If you were given sedation:  We recommend an adult stay with you for the first 24 hours.  Do not drive or operate heavy machinery for the rest of the day.  No alcoholic beverages for 24 hours.    Discharge Booklet given  Care Instructions   You may shower beginning tomorrow (post procedure day #1). Do not scrub site until well healed, pat dry gently with a towel.  You likely have skin adhesive over your port site. Skin adhesive works like a bandage to keep the site covered and protected. Do not use antibiotic ointment or creams/lotions over adhesive as it can break it down. The skin adhesive will peel off on its own (typically in 5-14 days).  Avoid submerging the port site under water (ex: tub baths, Jacuzzis, lakes, hot tubs and pools) for 10 days or until your site is well healed.  You may have  some discomfort, minimal swelling, redness and/or bruising at your port site/procedure site. You may take over the counter pain medication for discomfort (follow the package directions) or apply an ice pack wrapped in a towel over the site (rotating 20 minutes with ice pack on and 20 minutes with ice pack off) for comfort as needed. It can take several days for these to resolve.  Avoid heavy lifting (greater than 10 pounds) and strenuous activities for 2-3 days following your procedure.  If you experience significant bleeding at site, apply pressure with hands above the clavicle bone, sit upright and seek immediate medical assistance.  Ports need to be flushed approximately every 4 weeks, if not being used more frequently. Follow up with the provider who ordered your port placement for further instructions for this.    [facility] INTERVENTIONAL RADIOLOGY DEPARTMENT  Procedure Physician: Dr. Dowell  Date of procedure: January 29, 2025  Telephone Numbers:  [phone]      Monday-Friday 7:30 am to 4:00 pm         [phone] After 4:00 pm Monday-Friday, Weekends & Holidays.   Ask for the Interventional Radiologist on call.  Someone is on call 24 hrs/day  [facility] toll free number:    [phone] Monday-Friday 8:00 am to 4:30 pm  [facility] Emergency Dept:  [phone]        IF YOU ARE EXPERIENCING A MEDICAL EMERGENCY PLEASE CALL [phone]

## 2025-01-29 NOTE — PROGRESS NOTES
Patient tolerated recovery well. PIV removed. She has ambulated, voided, and tolerated a small snack. She is alert and oriented x 4 and able to make needs known. She has verbalized understanding of all discharge instructions, no questions asked. She declined offer of wheelchair and ambulated to the main entrance with all belongings accompanied by her  for discharge home.

## 2025-01-29 NOTE — PROCEDURES
[facility]    Procedure: 1) tunneled CVC removal 2) RIJV port placement    Date/Time: 1/29/2025 3:50 PM    Performed by: Blue Dowell MD  Authorized by: Blue Dowell MD  IR Fellow Physician:    Pre Procedure Diagnosis: need for vascular access  Post Procedure Diagnosis: same    UNIVERSAL PROTOCOL   Site Marked: NA  Prior Images Obtained and Reviewed:  Yes  Required items: Required blood products, implants, devices and special equipment available    Patient identity confirmed:  Arm band, provided demographic data, hospital-assigned identification number and verbally with patient  Patient was reevaluated immediately before administering moderate or deep sedation or anesthesia  Confirmation Checklist:  Correct equipment/implants were available, procedure was appropriate and matched the consent or emergent situation, relevant allergies and patient's identity using two indicators  Time out: Immediately prior to the procedure a time out was called    Universal Protocol: the Joint Commission Universal Protocol was followed    Preparation: Patient was prepped and draped in usual sterile fashion       ANESTHESIA    Anesthesia:  Local infiltration  Local Anesthetic:  Lidocaine 1% without epinephrine      SEDATION    Patient Sedated: No    See dictated procedure note for full details.  Findings: 1) RIJV tunneled CVC removal  2) RIJV SL port placement, tip in RA, accessed, hep locked, and ready for use.    Specimens: none    Procedural Complications: None    Condition: Stable      PROCEDURE    Patient Tolerance:  Patient tolerated the procedure well with no immediate complications  Length of time physician/provider present for 1:1 monitoring during sedation:  23-37 min

## 2025-01-29 NOTE — PROGRESS NOTES
Arrived to unit 2A, room 12, via litter from IR s/p right chest port placement and tunneled line removal. Site is covered with a IV 3000 bandage and is intact with scant oozing. Port is accessed and ready to use. Per IR RN, Dr. Dowell was okay with leaving port accessed but did not recommend it but patient insisted. She is eating and drinking. She is alert and oriented x 4 and able to make needs known. Will continue to monitor until discharge criteria is met.  is at bedside and will transport patient home.

## 2025-01-30 ENCOUNTER — OFFICE VISIT (OUTPATIENT)
Dept: NEUROLOGY | Facility: CLINIC | Age: 60
End: 2025-01-30
Payer: COMMERCIAL

## 2025-01-30 ENCOUNTER — HOME INFUSION BILLING (OUTPATIENT)
Dept: HOME HEALTH SERVICES | Facility: HOME HEALTH | Age: 60
End: 2025-01-30
Payer: COMMERCIAL

## 2025-01-30 DIAGNOSIS — G43.109 MIGRAINE WITH AURA AND WITHOUT STATUS MIGRAINOSUS, NOT INTRACTABLE: ICD-10-CM

## 2025-01-30 DIAGNOSIS — G43.709 CHRONIC MIGRAINE W/O AURA W/O STATUS MIGRAINOSUS, NOT INTRACTABLE: Primary | ICD-10-CM

## 2025-01-30 PROCEDURE — A4245 ALCOHOL WIPES PER BOX: HCPCS

## 2025-01-30 PROCEDURE — A4215 STERILE NEEDLE: HCPCS

## 2025-01-30 PROCEDURE — S9379 HIT NOC PER DIEM: HCPCS

## 2025-01-30 RX ORDER — ZOLMITRIPTAN 5 MG/1
5 TABLET, ORALLY DISINTEGRATING ORAL
Qty: 12 TABLET | Refills: 6 | Status: SHIPPED | OUTPATIENT
Start: 2025-01-30

## 2025-01-30 NOTE — PROGRESS NOTES
"PROCEDURE NOTE:     [facility]  Botulinum Toxin Procedure     Headache Neurology     01/30/25       Procedure:  OnabotulinumtoxinA injections for chronic migraine  Indication:  Chronic migraine     Dinora suffers from severe intractable headaches.  She was referred by for onabotulinumtoxinA injections for headache.  Risks, benefits, and alternatives were discussed.  All questions were answered and consent given.  She decided to proceed with the injections.      Headache history, from initial consult note: See Initial Headache Clinic visit on 8/3/2022 for details     Prior to initiation of botulinum toxin injections, Ms. Mcghee reported 13-15 headache days per month, with 3-4 severe headache days per month. Her headaches are quite disabling and often interfere with her ability to function normally.     Date of last injections: 11/7/2024     Ms. Mcghee reports several minor and about 3 severe  migraine headaches in the last months before Botox    She has noticed a wearing off phenomenon prior to this round of botulinum toxin injections, lasting 2-3 weeks.     Botulinum toxin injections have improved their functioning: able to read better, walking and not in bed      She has attempted other migraine prophylactic treatments in the past, which have included:   Amitriptyline 30 mg   Topiramate  Sumatriptan   lyrica-\"brain fog\"  depakote -allergies  Compazine +benedryl  Promethazine IV three times per week for   scopalamine patch     She currently takes amitriptyline, promethazine, zolmitriptine for headache prevention.     Ms. Mcghee's pain was assessed prior to the procedure.  She rated her pain today as 2 out of 10.     Procedural Pause: Procedural pause was conducted to verify correct patient identity, procedure to be performed, correct side and site, correct patient position, and special requirements. Appropriate hand hygiene was utilized, and each injection site was prepped with alcohol wipe or Chloraprep " swab.      Procedure Details: 200 units of onabotulinumtoxinA was diluted in 4 mL 0.9% normal saline. A total of 155 units of onabotulinumtoxinA were injected using 30 gauge 0.5 in needles into the muscles listed below. 45 units of onabotulinumtoxinA were wasted.         GOAL OF PROCEDURE:  The goal of this procedure is to decrease pain and enhance functional independence.     CONSENT:  The risks, benefits, and treatment options were discussed with the patient who agreed to proceed.     Written consent was obtained      EQUIPMENT USED:  Needles-30 gauge, 0.5 inches for injections  Four 1-ml tuberculin syringes for injections  One sodium chloride 10 ml vial preservative free  Alcohol swabs     SKIN PREPARATION:  Skin preparation was performed using an alcohol wipe.        AREA/MUSCLE INJECTED:  155 units of Botox  Right upper Trapezius (upper cervical) - 5 units of Botox at 3 site/s.   Left upper Trapezius (upper cervical) - 5 units of Botox at 3 site/s.      Right cervical paraspinals - 5 units of Botox at 2 site/s.   Left cervical paraspinals - 5 units of Botox at 2 site/s.      Left occipitalis - 5 units of Botox at 3 site/s.  Right occipitalis -5 units of Botox at 3 site/s     Right Frontalis - 5 units of Botox at 2 site/s.  Left Frontalis - 5 units of Botox at 2 site/s.     Right Temporalis - 5 units of Botox at 4 site/s.  Left Temporalis - 5 units of Botox at 4 site/s.     Right  - 5 units of Botox at 1 site/s.              Left  - 5 units of Botox at 1 site/s.     Procerus - 5 units of Botox at 1 site/s.     RESPONSE TO PROCEDURE:  tolerated the procedure well and there were no immediate complications.  Patient was allowed to recover for an appropriate period of time and was discharged home in stable condition.     Left PICC -patient asked to take a look at her PICC site. Painful when bumps into something but otherwise no pain, no erythema, edema. 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 She will observed and goes to ED if any problems      FOLLOW UP:     Repeat Botox injections in 12 weeks        This procedure was performed under a hospital privileging agreement with NATALY Bonner, [facility]

## 2025-01-30 NOTE — LETTER
"1/30/2025       RE: Dinora Mcghee  [address] [facility] [address]     Dear Colleague,    Thank you for referring your patient, Dinora Mcghee, to the [facility] [place] at [facility]. Please see a copy of my visit note below.    PROCEDURE NOTE:     [facility]  Botulinum Toxin Procedure     Headache Neurology     01/30/25       Procedure:  OnabotulinumtoxinA injections for chronic migraine  Indication:  Chronic migraine     Dinora suffers from severe intractable headaches.  She was referred by for onabotulinumtoxinA injections for headache.  Risks, benefits, and alternatives were discussed.  All questions were answered and consent given.  She decided to proceed with the injections.      Headache history, from initial consult note: See Initial Headache Clinic visit on 8/3/2022 for details     Prior to initiation of botulinum toxin injections, Ms. Mcghee reported 13-15 headache days per month, with 3-4 severe headache days per month. Her headaches are quite disabling and often interfere with her ability to function normally.     Date of last injections: 11/7/2024     Ms. Mcghee reports several minor and about 3 severe  migraine headaches in the last months before Botox    She has noticed a wearing off phenomenon prior to this round of botulinum toxin injections, lasting 2-3 weeks.     Botulinum toxin injections have improved their functioning: able to read better, walking and not in bed      She has attempted other migraine prophylactic treatments in the past, which have included:   Amitriptyline 30 mg   Topiramate  Sumatriptan   lyrica-\"brain fog\"  depakote -allergies  Compazine +benedryl  Promethazine IV three times per week for   scopalamine patch     She currently takes amitriptyline, promethazine, zolmitriptine for headache prevention.     Ms. Mcghee's pain was assessed prior to the procedure.  She rated her pain today as 2 " out of 10.     Procedural Pause: Procedural pause was conducted to verify correct patient identity, procedure to be performed, correct side and site, correct patient position, and special requirements. Appropriate hand hygiene was utilized, and each injection site was prepped with alcohol wipe or Chloraprep swab.      Procedure Details: 200 units of onabotulinumtoxinA was diluted in 4 mL 0.9% normal saline. A total of 155 units of onabotulinumtoxinA were injected using 30 gauge 0.5 in needles into the muscles listed below. 45 units of onabotulinumtoxinA were wasted.         GOAL OF PROCEDURE:  The goal of this procedure is to decrease pain and enhance functional independence.     CONSENT:  The risks, benefits, and treatment options were discussed with the patient who agreed to proceed.     Written consent was obtained      EQUIPMENT USED:  Needles-30 gauge, 0.5 inches for injections  Four 1-ml tuberculin syringes for injections  One sodium chloride 10 ml vial preservative free  Alcohol swabs     SKIN PREPARATION:  Skin preparation was performed using an alcohol wipe.        AREA/MUSCLE INJECTED:  155 units of Botox  Right upper Trapezius (upper cervical) - 5 units of Botox at 3 site/s.   Left upper Trapezius (upper cervical) - 5 units of Botox at 3 site/s.      Right cervical paraspinals - 5 units of Botox at 2 site/s.   Left cervical paraspinals - 5 units of Botox at 2 site/s.      Left occipitalis - 5 units of Botox at 3 site/s.  Right occipitalis -5 units of Botox at 3 site/s     Right Frontalis - 5 units of Botox at 2 site/s.  Left Frontalis - 5 units of Botox at 2 site/s.     Right Temporalis - 5 units of Botox at 4 site/s.  Left Temporalis - 5 units of Botox at 4 site/s.     Right  - 5 units of Botox at 1 site/s.              Left  - 5 units of Botox at 1 site/s.     Procerus - 5 units of Botox at 1 site/s.     RESPONSE TO PROCEDURE:  tolerated the procedure well and there were no immediate  complications.  Patient was allowed to recover for an appropriate period of time and was discharged home in stable condition.     Left PICC -patient asked to take a look at her PICC site. Painful when bumps into something but otherwise no pain, no erythema, edema. 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 She will observed and goes to ED if any problems      FOLLOW UP:     Repeat Botox injections in 12 weeks        This procedure was performed under a hospital privileging agreement with NATALY Bonner, CNP  [facility]      Again, thank you for allowing me to participate in the care of your patient.      Sincerely,    NATALY Hoffman CNP

## 2025-01-31 PROCEDURE — S9379 HIT NOC PER DIEM: HCPCS

## 2025-02-01 PROCEDURE — 99601 HOME NFS VISIT <2 HRS: CPT

## 2025-02-01 PROCEDURE — A4216 STERILE WATER/SALINE, 10 ML: HCPCS

## 2025-02-01 PROCEDURE — S9379 HIT NOC PER DIEM: HCPCS

## 2025-02-02 ENCOUNTER — HEALTH MAINTENANCE LETTER (OUTPATIENT)
Age: 60
End: 2025-02-02

## 2025-02-02 PROCEDURE — S9379 HIT NOC PER DIEM: HCPCS

## 2025-02-03 ENCOUNTER — HOME INFUSION BILLING (OUTPATIENT)
Dept: HOME HEALTH SERVICES | Facility: HOME HEALTH | Age: 60
End: 2025-02-03
Payer: COMMERCIAL

## 2025-02-03 PROCEDURE — S9379 HIT NOC PER DIEM: HCPCS

## 2025-02-03 PROCEDURE — A4215 STERILE NEEDLE: HCPCS

## 2025-02-04 PROCEDURE — S9379 HIT NOC PER DIEM: HCPCS

## 2025-02-04 PROCEDURE — 99601 HOME NFS VISIT <2 HRS: CPT

## 2025-02-05 PROCEDURE — S9379 HIT NOC PER DIEM: HCPCS

## 2025-02-05 PROCEDURE — S9379 HIT NOC PER DIEM: HCPCS | Mod: SH

## 2025-02-06 ENCOUNTER — HOME INFUSION BILLING (OUTPATIENT)
Dept: HOME HEALTH SERVICES | Facility: HOME HEALTH | Age: 60
End: 2025-02-06
Payer: COMMERCIAL

## 2025-02-06 ENCOUNTER — HOME INFUSION (OUTPATIENT)
Dept: HOME HEALTH SERVICES | Facility: HOME HEALTH | Age: 60
End: 2025-02-06
Payer: COMMERCIAL

## 2025-02-06 PROCEDURE — S9379 HIT NOC PER DIEM: HCPCS | Mod: SH

## 2025-02-06 PROCEDURE — A4245 ALCOHOL WIPES PER BOX: HCPCS

## 2025-02-06 PROCEDURE — S9379 HIT NOC PER DIEM: HCPCS

## 2025-02-06 PROCEDURE — A4215 STERILE NEEDLE: HCPCS

## 2025-02-07 PROCEDURE — S9379 HIT NOC PER DIEM: HCPCS

## 2025-02-07 PROCEDURE — S9379 HIT NOC PER DIEM: HCPCS | Mod: SH

## 2025-02-08 PROCEDURE — S9379 HIT NOC PER DIEM: HCPCS

## 2025-02-09 PROCEDURE — S9379 HIT NOC PER DIEM: HCPCS

## 2025-02-10 PROCEDURE — S9379 HIT NOC PER DIEM: HCPCS

## 2025-02-10 PROCEDURE — 99601 HOME NFS VISIT <2 HRS: CPT

## 2025-02-11 PROCEDURE — S9379 HIT NOC PER DIEM: HCPCS

## 2025-02-12 PROCEDURE — S9379 HIT NOC PER DIEM: HCPCS

## 2025-02-13 ENCOUNTER — HOME INFUSION BILLING (OUTPATIENT)
Dept: HOME HEALTH SERVICES | Facility: HOME HEALTH | Age: 60
End: 2025-02-13
Payer: COMMERCIAL

## 2025-02-13 ENCOUNTER — HOME INFUSION (OUTPATIENT)
Dept: HOME HEALTH SERVICES | Facility: HOME HEALTH | Age: 60
End: 2025-02-13
Payer: COMMERCIAL

## 2025-02-13 PROCEDURE — 99601 HOME NFS VISIT <2 HRS: CPT

## 2025-02-13 PROCEDURE — A4215 STERILE NEEDLE: HCPCS

## 2025-02-13 PROCEDURE — S9379 HIT NOC PER DIEM: HCPCS

## 2025-02-14 PROCEDURE — S9379 HIT NOC PER DIEM: HCPCS

## 2025-02-15 PROCEDURE — S9379 HIT NOC PER DIEM: HCPCS

## 2025-02-16 PROCEDURE — S9379 HIT NOC PER DIEM: HCPCS

## 2025-02-17 PROCEDURE — S9379 HIT NOC PER DIEM: HCPCS

## 2025-02-18 PROCEDURE — S9379 HIT NOC PER DIEM: HCPCS

## 2025-02-19 ENCOUNTER — TELEPHONE (OUTPATIENT)
Dept: ENDOCRINOLOGY | Facility: CLINIC | Age: 60
End: 2025-02-19
Payer: COMMERCIAL

## 2025-02-19 ENCOUNTER — HOME INFUSION BILLING (OUTPATIENT)
Dept: HOME HEALTH SERVICES | Facility: HOME HEALTH | Age: 60
End: 2025-02-19
Payer: COMMERCIAL

## 2025-02-19 DIAGNOSIS — E10.649 TYPE 1 DIABETES MELLITUS WITH HYPOGLYCEMIA AND WITHOUT COMA (H): ICD-10-CM

## 2025-02-19 DIAGNOSIS — Z90.410 POST-PANCREATECTOMY DIABETES (H): Primary | ICD-10-CM

## 2025-02-19 DIAGNOSIS — E89.1 POST-PANCREATECTOMY DIABETES (H): Primary | ICD-10-CM

## 2025-02-19 DIAGNOSIS — E13.9 POST-PANCREATECTOMY DIABETES (H): Primary | ICD-10-CM

## 2025-02-19 PROCEDURE — S9379 HIT NOC PER DIEM: HCPCS

## 2025-02-19 RX ORDER — HYDROCHLOROTHIAZIDE 12.5 MG/1
CAPSULE ORAL
Qty: 2 EACH | Refills: 11 | Status: SHIPPED | OUTPATIENT
Start: 2025-02-19

## 2025-02-19 RX ORDER — ACYCLOVIR 800 MG/1
TABLET ORAL
Qty: 3 EACH | Refills: 11 | Status: SHIPPED | OUTPATIENT
Start: 2025-02-19 | End: 2025-02-19 | Stop reason: ALTCHOICE

## 2025-02-19 NOTE — TELEPHONE ENCOUNTER
Updated FL3 Sensors to FL3+ sensors per patient request.     Mauricio Matos, PharmD  [facility]         trf for cards eval nstemi

## 2025-02-20 ENCOUNTER — VIRTUAL VISIT (OUTPATIENT)
Dept: TRANSPLANT | Facility: CLINIC | Age: 60
End: 2025-02-20
Payer: COMMERCIAL

## 2025-02-20 ENCOUNTER — HOME INFUSION BILLING (OUTPATIENT)
Dept: HOME HEALTH SERVICES | Facility: HOME HEALTH | Age: 60
End: 2025-02-20
Payer: COMMERCIAL

## 2025-02-20 DIAGNOSIS — E13.9 POST-PANCREATECTOMY DIABETES (H): ICD-10-CM

## 2025-02-20 DIAGNOSIS — Z90.410 POST-PANCREATECTOMY DIABETES (H): ICD-10-CM

## 2025-02-20 DIAGNOSIS — E10.9 TYPE 1 DIABETES MELLITUS WITHOUT COMPLICATION (H): Primary | ICD-10-CM

## 2025-02-20 DIAGNOSIS — E89.1 POST-PANCREATECTOMY DIABETES (H): ICD-10-CM

## 2025-02-20 PROCEDURE — A4215 STERILE NEEDLE: HCPCS

## 2025-02-20 PROCEDURE — S9379 HIT NOC PER DIEM: HCPCS

## 2025-02-20 NOTE — PATIENT INSTRUCTIONS
1)  I think our best next step for dealing with post meal hyper and hypoglycemia and lows with exercise is to move to an automated insulin pump system that will work with a compatible CGM to allow some adjustment of background insulin automatically.    2)  Diabetes ed referral placed for pre-pump education.    3)  Continue same insulin plan for now.    4)  Restart on MVW vitamin from [facility]     5)  You will see us in April and have labs at that time.

## 2025-02-20 NOTE — PROGRESS NOTES
Dinora is a 59 year old who is being evaluated via a billable video visit.          Video-Visit Details    Type of service:  Video Visit   Originating Location (pt. Location): Home    Distant Location (provider location):  On-site  Platform used for Video Visit: Emeli  Signed Electronically by: Gloria Melissa MD        Video duration: 14 minutes    [facility]  Islet Autotransplant, Diabetes Follow Up    Problem List:  Patient Active Problem List   Diagnosis    Hypothyroidism    Need for prophylactic immunotherapy    Sprain and strain of other specified sites of hip and thigh    Chondromalacia of patella    Sensorineural hearing loss    Vertiginous syndrome and labyrinthine disorder    Lumbago    Allergic rhinitis due to other allergen    GERD (gastroesophageal reflux disease)    Other type of intractable migraine    History of ERCP    Abdominal pain    S/P ERCP    Post-pancreatectomy diabetes (H)    Pancreatic insufficiency    Gastroparesis    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    Incisional hernia, without obstruction or gangrene    Adhesive capsulitis of shoulder, unspecified laterality    S/P hernia repair    Headache, chronic migraine without aura    Chronic pain syndrome    Iron deficiency anemia    Hypoglycemia    Adult failure to thrive    Abdominal pain, generalized    Gastrostomy tube obstruction (H)    Intra-abdominal abscess (H)    Postprocedural intraabdominal abscess (H)    Acquired absence of spleen    Depressive disorder    Diabetes insipidus    Other specified abnormal immunological findings in serum    Poisoning by drug    Sphincter of Oddi dysfunction    Type 1 diabetes mellitus without complication (H)    Myofascial pain    Feeding intolerance    Acute postoperative abdominal pain    Major depression, single episode    History of food intolerance    Malnutrition, unspecified type    Nausea and vomiting, unspecified vomiting type    On total parenteral nutrition  (TPN)    Fever, unspecified fever cause    Chronic pancreatitis, unspecified pancreatitis type (H)    Cholangitis (H)    Abdominal pain, unspecified abdominal location    Bacteremia    Constipation    Right upper quadrant abdominal pain    S/P pancreatic islet cell transplantation (H)    Dislodged jejunostomy tube           Assessment:  1.  Post pancreatectomy diabetes mellitus, s/p total pancreatectomy and islet autotransplant.  (ICD-10 E89.1)  2.  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   Gastroparesis  4.  Nausea, vomiting      Dinora is a 59 year old with history of chronic pancreatitis who is s/p total pancreatectomy and islet autotransplant.  She has been on and off insulin with A1c in goal range in the past, but more recently has been consistently needing low dose insulin.  Major interval changes complicating diabetes management include: recent colectomy (Oct 2024), TPN use, enteral feeds.  She was also admitted with sepsis in 11/2024 with increased insulin needs then, albeit still on a low dose.    She is doing much better still from a GI perspective, entirely on an oral diet despite gastroparesis.  With transitioning to oral diet, she has needed more subcutaneous insulin and has variability including highs and lows around meals.  Meal sizes are uncertain with gastroparesis so pre-meal bolusing is a challenge. She is now getting more active again and has lows with activity.  Overall I think that her best treatment option is a closed loop pump at this point, which may be better for reducing hypoglycemia around exercise, and also controlling post meal BGs that are complicated by her gastroparesis.  She is on a low insulin dose so also has the advantage of more precise dosing.      Also needs to get back on vitamins now that she is off TPN.  Will have labs in April.      Plan:  1.   Changes to current diabetes regimen:  Patient Instructions   1)  I think our best next step for dealing with post meal hyper and hypoglycemia and lows with exercise is to move to an automated insulin pump system that will work with a compatible CGM to allow some adjustment of background insulin automatically.    2)  Diabetes ed referral placed for pre-pump education.    3)  Continue same insulin plan for now.    4)  Restart on MVW vitamin from [employer]     5)  You will see us in April and have labs at that time.       2.  Frequency of blood sugar checks:  Keep wearing Yandy-- this is much better for Dinora than fingersticks because with fingersticks we miss the really critical data of the post prandial excursions.    3.  Continue routine follow up for autoislet transplant patients:  Mixed meal test (6 mL/kg BoostHP to max of 360 mL) at 3 months, 6 months, and once a year post transplant.  Hemoglobin A1c levels at these time points and quarterly.    4.  Other issues addressed today:  none    Follow up:  4 mos    Contact me for questions at [phone] or [phone].  Emergency number to reach pediatric endocrinology after hours is [phone].        Dr. Gloria Melissa MD  [facility]  Director of Research, Islet Auto-transplant Program  Phone:  [phone]  Electronically signed: February 20, 2025 at 2:30 PM      30 minutes spent by me on the date of the encounter doing chart review, history and exam, documentation and further activities per the note         The longitudinal plan of care for the diagnosis(es)/condition(s) as documented were addressed during this visit. Due to the added complexity in care, I will continue to support Dinora in the subsequent management and with ongoing continuity of care.        HPI:  Dinora is a 59 year old  female here for follow up oflaparoscopic assisted total pancreatectomy, islet cell autotransplant, splenectomy,  gastrojejunostomy tube revision, choledochoduodenostomy, duodenojejunostomy, Vera-Y reconstruction performed on 2016.  At the time of the procedure, the patient received:  Total Islet number: [id number].  Total Islet number/k.  Islet equivalents: [id number]  Islet equivalents/kilogram: 4091    Post-surgical course was complicated by two minor procedures for a retained foreign body near GJ site in 2017 and hernia repair 2018, and severe gastroparesis (refractory to J-feeds, domperidone).  She was initially insulin independent at about 1 year after surgery.    - Gastroparesis and GI dysmotility is her major issue post TPIAT. Admitted multiple times. GJ was helpful for short time but did not tolerate well.  Has tried multiple other approaches and continues to worsen, nothing offering benefit.  Tried expanded access domperidone but not helpful  - Also has hypoglycemia (prior treatments SGLT2 inhibitor not helpful, acarbose is helpful when eating, also uses mini glucagon).   -- Multiple failures of GJ feeding tubes.  -- Most recently had total colectomy and ostomy for severe slow transit constipation 2024, with gram negative sepsis 24      At today's visit,   --  Ann central line changed over to a port.  Gets IVF at home every 2-3 days, otherwise all oral diet. Eating pretty well, expanding food options, still complicated by some gastroparesis but better.  -- Not on a multivitamin since coming off TPN  -- Generally getting more active- concern around hypoglycemia however, with activity.  -- Diabetes management complicated by post meal highs and lows, and the exercise lows.       Diabetes history:  Current insulin regimen:  Glargine, 6 units daily.  Humalog, getting yousif pen -- 0.5 unit per 10 grams with meals.   Covers post meal b/c of ongoing uncertainty of regarding how much her stomach can handle.       Wearing Yandy- pattern = post prandial hyperglycemia                Recent hemoglobin A1c  levels:      Lab Results   Component Value Date    A1C 5.4 07/23/2024    A1C 5.6 03/20/2024    A1C 5.4 02/17/2022    A1C 5.2 11/27/2021    A1C 5.5 09/18/2021    A1C 5.4 08/05/2021    A1C 5.7 05/12/2021    A1C 5.9 04/01/2021    A1C 5.5 12/17/2020    A1C 5.8 07/30/2020       Order: [id number]  Component  Ref Range & Units 2 mo ago   Hemoglobin A1c, B  4.2 - 5.6 % 6.2 High    Comment: Hemoglobin A1c values of 5.7-6.4 percent indicate an  increased risk for developing diabetes mellitus. In  diabetic patients, HbA1c goals should be discussed with  healthcare provider.   Resulting Agency [facility]     Specimen Collected: 11/25/24 10:45 AM    Performed by: [facility] Last Resulted: 11/25/24 12:06 PM   Received From: [facility]  Result Received: 11/25/24  2:27 PM       Hypoglycemia history: mild lows but rapid falls. The patient has had 0 episodes of severe hypoglycemia (seizure, coma, or neuroglycopenic symptoms severe enough to require assistance from another person).  Blood sugars were reviewed from the patient records and/or the meter download.          Review of systems:  A complete ROS is negative except as noted in HPI above.      Past Medical and Surgical History:  Past Medical History:   Diagnosis Date    Allergic rhinitis, cause unspecified     Allergy to other foods     strawberries, apples, celeries, alice, watermelon    Arthritis     left knee    Choledocholithiasis     long after cholecystectomy    Chronic abdominal pain     Chronic constipation     Chronic nausea     Chronic pancreatitis (H)     Degeneration of lumbar or lumbosacral intervertebral disc     Esophageal reflux     w/ hiatal hernia    Gastroparesis     Hiatal hernia     History of pituitary adenoma     s/p resection    Hypothyroidism     Migraines     Mild hyperlipidemia     On tube feeding diet     presence of GJ tube    Pancreatic disease     PD stricture, 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     PONV (postoperative  nausea and vomiting)     Portacath in place     Type 1 diabetes mellitus without complication (H) 2022    Unspecified hearing loss     25% high frequency R     Past Surgical History:   Procedure Laterality Date    ABDOMEN SURGERY  1999    c sections: 93, 96, 98. endometriosis growth    APPENDECTOMY       SECTION  1993    COLONOSCOPY  2014    COLONOSCOPY N/A 2023    Procedure: COLONOSCOPY, WITH POLYPECTOMY AND BIOPSY;  Surgeon: Percy Chamorro MD;  [facility]    ENDOSCOPIC INSERTION TUBE GASTROSTOMY N/A 2021    Procedure: Gastrojejonostomy placement with jejunopexy, PEG tube exchange;  Surgeon: Zackery Montoya MD;  [facility]    ENDOSCOPIC RETROGRADE CHOLANGIOPANCREATOGRAM N/A 2015    Procedure: ENDOSCOPIC RETROGRADE CHOLANGIOPANCREATOGRAM;  Surgeon: Mandeep Park MD;  [facility]    ENDOSCOPIC RETROGRADE CHOLANGIOPANCREATOGRAM N/A 2016    Procedure: COMBINED ENDOSCOPIC RETROGRADE CHOLANGIOPANCREATOGRAPHY, PLACE TUBE/STENT;  Surgeon: Mandeep Park MD;  [facility]    ENDOSCOPIC RETROGRADE CHOLANGIOPANCREATOGRAM N/A 2016    Procedure: COMBINED ENDOSCOPIC RETROGRADE CHOLANGIOPANCREATOGRAPHY, REMOVE FOREIGN BODY OR STENT/TUBE;  Surgeon: Mandeep Park MD;  [facility]    ENDOSCOPIC RETROGRADE CHOLANGIOPANCREATOGRAM N/A 2016    Procedure: COMBINED ENDOSCOPIC RETROGRADE CHOLANGIOPANCREATOGRAPHY, PLACE TUBE/STENT;  Surgeon: Mandeep Park MD;  [facility]    ENDOSCOPIC RETROGRADE CHOLANGIOPANCREATOGRAM N/A 2016    Procedure: COMBINED ENDOSCOPIC RETROGRADE CHOLANGIOPANCREATOGRAPHY, REMOVE FOREIGN BODY OR STENT/TUBE;  Surgeon: Mandeep Park MD;  [facility]    ENDOSCOPIC ULTRASOUND UPPER GASTROINTESTINAL TRACT (GI) N/A 10/03/2016    Procedure: ENDOSCOPIC ULTRASOUND, ESOPHAGOSCOPY / UPPER GASTROINTESTINAL TRACT (GI);  Surgeon: Guru Jose Rodas  MD Tuyet;  [facility]    ENT SURGERY  1989    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    ENTEROSCOPY SMALL BOWEL N/A 02/03/2022    Procedure: ENTEROSCOPY with possible fistula closure;  Surgeon: Francisco Dodson MD;  Location:  GI    ENTEROSCOPY SMALL BOWEL N/A 9/11/2024    Procedure: Upper endoscopy, gastrostomy tube placement and jejunostomy tube exchange;  Surgeon: Zackery Montoya MD;  [facility]    ESOPHAGOSCOPY, GASTROSCOPY, DUODENOSCOPY (EGD), COMBINED N/A 06/24/2015    Procedure: COMBINED ESOPHAGOSCOPY, GASTROSCOPY, DUODENOSCOPY (EGD), REMOVE FOREIGN BODY;  Surgeon: Mandeep Park MD;  [facility]    ESOPHAGOSCOPY, GASTROSCOPY, DUODENOSCOPY (EGD), COMBINED N/A 10/25/2015    Procedure: COMBINED ESOPHAGOSCOPY, GASTROSCOPY, DUODENOSCOPY (EGD);  Surgeon: Sammy Amaro MD;  Location:  GI    ESOPHAGOSCOPY, GASTROSCOPY, DUODENOSCOPY (EGD), COMBINED N/A 10/25/2015    Procedure: COMBINED ESOPHAGOSCOPY, GASTROSCOPY, DUODENOSCOPY (EGD), BIOPSY SINGLE OR MULTIPLE;  Surgeon: Sammy Amaro MD;  Location:  GI    ESOPHAGOSCOPY, GASTROSCOPY, DUODENOSCOPY (EGD), COMBINED N/A 01/31/2023    Procedure: ESOPHAGOGASTRODUODENOSCOPY (EGD) with peg replacement ;  Surgeon: Mandeep Park MD;  [facility]    ESOPHAGOSCOPY, GASTROSCOPY, DUODENOSCOPY (EGD), COMBINED N/A 7/24/2024    Procedure: Esophagoscopy, gastroscopy, duodenoscopy (EGD), combined;  Surgeon: Zackery Montoya MD;  [facility]    ESOPHAGOSCOPY, GASTROSCOPY, DUODENOSCOPY (EGD), COMBINED N/A 10/31/2024    Procedure: Esophagoscopy, gastroscopy, duodenoscopy (EGD), combined;  Surgeon: Mandeep Park MD;  [facility]    ESOPHAGOSCOPY, GASTROSCOPY, DUODENOSCOPY (EGD), DILATATION, COMBINED      EXCISE LESION TRUNK N/A 04/17/2017    Procedure: EXCISE LESION TRUNK;  Removal of Abdominal Foreign Body;  Surgeon: Nestor Phoenix MD;  [facility]    HC ESOPH/GAS REFLUX TEST W NASAL IMPED >1 HR  N/A 11/19/2015    Procedure: ESOPHAGEAL IMPEDENCE FUNCTION TEST WITH 24 HOUR PH GREATER THAN 1 HOUR;  Surgeon: Thiago Apple MD;  [facility]     UGI ENDOSCOPY DIAG W BIOPSY  09/17/2008     UGI ENDOSCOPY DIAG W BIOPSY  09/27/2012    HC UGI ENDOSCOPY W ESOPHAGEAL DILATION BALLOON <30MM  09/17/2008    HC UGI ENDOSCOPY W EUS N/A 05/05/2015    Procedure: COMBINED ENDOSCOPIC ULTRASOUND, ESOPHAGOSCOPY, GASTROSCOPY, DUODENOSCOPY (EGD);  Surgeon: Wm Dueñas MD;  [facility]     WRIST ARTHROSCOP,RELEASE XVERS LIG Bilateral 12/17/2008    INJECT TRANSVERSUS ABDOMINIS PLANE (TAP) BLOCK BILATERAL Left 09/22/2016    Procedure: INJECT TRANSVERSUS ABDOMINIS PLANE (TAP) BLOCK BILATERAL;  Surgeon: Dickson Corrigan MD;  [facility]    INJECT TRIGGER POINT Bilateral 09/08/2022    Procedure: Abdominal trigger point injection with ultrasound;  Surgeon: Monika Mahajan MD;  [facility]    INJECT TRIGGER POINT SINGLE / MULTIPLE 3 OR MORE MUSCLES Right 11/15/2022    Procedure: Trigger point injections right abdomen with ultrasound guidance;  Surgeon: Monika Mahajan MD;  [facility]    IR CHEST PORT PLACEMENT < 5 YRS OF AGE  06/10/2022    IR CHEST PORT PLACEMENT > 5 YRS OF AGE  1/29/2025    IR CVC TUNNEL PLACEMENT > 5 YRS OF AGE  4/15/2024    IR CVC TUNNEL REMOVAL RIGHT  1/29/2025    IR PORT REMOVAL RIGHT  11/09/2023    LAPAROSCOPIC ASSISTED COLECTOMY N/A 10/11/2024    Procedure: Exploratory laparotomy, extensive lysis of adhesions, revision of Jtube and small bowel resection, TOTAL ABDOMINAL COLECTOMY WITH ILEORECTAL ANASTAMOSIS, DIVERTING LOOP ILEOSTOMY, flexible sigmoidoscopy;  Surgeon: Kaylene Branch MD;  [facility]    laparoscopic pineda  01/01/1995    LAPAROSCOPIC HERNIORRHAPHY INCISIONAL N/A 08/23/2018    Procedure: LAPAROSCOPIC HERNIORRHAPHY INCISIONAL;  Laparoscopic Incisional Hernia Repair with Symbotex Mesh Implant;  Surgeon: Nestor Phoenix MD;  [facility]     LAPAROSCOPIC PANCREATECTOMY, TRANSPLANT AUTO ISLET CELL N/A 12/28/2016    Procedure: LAPAROSCOPIC PANCREATECTOMY, TRANSPLANT AUTO ISLET CELL;  Surgeon: Nestor Phoenix MD;  [facility]    LAPAROTOMY EXPLORATORY N/A 01/31/2023    Procedure: Exploratory Laparotomy, lysis of adhesions, Perforated J-Junostomy Resection, Replace J-Junostomy site;  Surgeon: Elver Bauer MD;  [facility]    LAPAROTOMY, LYSIS ADHESIONS, COMBINED N/A 01/31/2023    Procedure: Dilatation of jejunostomy feeding tube, track with placement of jejunostomy tube with fluoroscopy;  Surgeon: Elver Bauer MD;  [facility]    PICC DOUBLE LUMEN PLACEMENT Left 11/13/2023    Basilic Vein 5F DL 43 cm    REMOVE AND REPLACE BREAST IMPLANT PROSTHESIS N/A 12/30/2022    Procedure: Exploratory Laparotomy, lysis of adhesions, small bowel resection. Placement of gastric jejunostomy for enteral feeding.;  Surgeon: Elver Bauer MD;  [facility]    REMOVE GASTROSTOMY TUBE ADULT N/A 01/28/2022    Procedure: REMOVAL, GASTROSTOMY TUBE, ADULT;  Surgeon: Mandeep Park MD;  [facility]    REMOVE GASTROSTOMY TUBE ADULT N/A 12/2/2024    Procedure: Remove gastrostomy tube adult- PEG;  Surgeon: Mandeep Park MD;  [facility]    REPLACE GASTROJEJUNOSTOMY TUBE, PERCUTANEOUS N/A 09/07/2021    Procedure: GASTROJEJUNOSTOMY TUBE PLACEMENT, PERCUTANEOUS, WITH GASTROPEXY;  Surgeon: Mandeep Park MD;  [facility]    REPLACE GASTROJEJUNOSTOMY TUBE, PERCUTANEOUS N/A 09/22/2021    Procedure: REPLACEMENT, GASTROJEJUNOSTOMY TUBE, PERCUTANEOUS;  Surgeon: Zackery Montoya MD;  [facility]    REPLACE GASTROJEJUNOSTOMY TUBE, PERCUTANEOUS N/A 11/22/2022    Procedure: REPLACEMENT, GASTROJEJUNOSTOMY TUBE, PERCUTANEOUS;  Surgeon: Mandeep Park MD;  [facility]    REPLACE GASTROJEJUNOSTOMY TUBE, PERCUTANEOUS N/A 12/09/2022    Procedure: REPLACEMENT, GASTROJEJUNOSTOMY TUBE, PERCUTANEOUS  with ENDOSCOPIC SUTURING.;  Surgeon: Mandeep Park MD;  [facility]    REPLACE GASTROJEJUNOSTOMY TUBE, PERCUTANEOUS N/A 2023    Procedure: Replace Gastrojejunostomy Tube, Percutaneous;  Surgeon: Mandeep Park MD;  [facility]    REPLACE GASTROJEJUNOSTOMY TUBE, PERCUTANEOUS N/A 2023    Procedure: Replace Gastrojejunostomy Tube, Percutaneous;  Surgeon: Francisco Dodson MD;  [facility]    REPLACE GASTROJEJUNOSTOMY TUBE, PERCUTANEOUS N/A 2023    Procedure: Replace Gastrojejunostomy Tube, Percutaneous;  Surgeon: Mandeep Park MD;  [facility]    REPLACE GASTROSTOMY TUBE, PERCUTANEOUS N/A 10/31/2024    Procedure: REPLACEMENT, GASTROSTOMY TUBE, PERCUTANEOUS;  Surgeon: Mandeep Park MD;  [facility]    REPLACE JEJUNOSTOMY TUBE, PERCUTANEOUS N/A 09/10/2021    Procedure: UPPER ENDOSCOPY, REPLACEMENT OF PERCUTANEOUS GASTROJEJUNOSTOMY TUBE, T-TAG GASTROPEXY;  Surgeon: Zackery Montoya MD;  [facility]    REPLACE JEJUNOSTOMY TUBE, PERCUTANEOUS N/A 2021    Procedure: REPLACEMENT, JEJUNOSTOMY TUBE, PERCUTANEOUS;  Surgeon: Mandeep Park MD;  [facility]    REPLACE JEJUNOSTOMY TUBE, PERCUTANEOUS N/A 2023    Procedure: REPLACEMENT, JEJUNOSTOMY TUBE, PERCUTANEOUS;  Surgeon: Mandeep Park MD;  [facility]    REPLACE JEJUNOSTOMY TUBE, PERCUTANEOUS  2023    Procedure: Replace Jejunostomy Tube, Percutaneous;  Surgeon: Mandeep Park MD;  [facility]    REPLACE JEJUNOSTOMY TUBE, PERCUTANEOUS N/A 2024    Procedure: REPLACEMENT, JEJUNOSTOMY TUBE, PERCUTANEOUS;  Surgeon: Francisco Dodson MD;  [facility]    REPLACE JEJUNOSTOMY TUBE, PERCUTANEOUS N/A 10/31/2024    Procedure: REPLACEMENT, JEJUNOSTOMY TUBE, PERCUTANEOUS;  Surgeon: Mandeep Park MD;  [facility]    transphenoidal pituitary resection  1990    [facility]  DELIVERY ONLY  1996    [facility]  DELIVERY ONLY   01/01/1998    repeat c section with incidental cystotomy with repair    ZZC EXCIS PITUITARY,TRANSNASAL/SEPTAL  01/01/1980    pituitary tumor removed for diabetes insipidus    ZZC TOTAL ABDOM HYSTERECTOMY  01/01/1999    w/ bilateral salpingoophorectomy        Family History:  New changes since last visit:  none  Family History   Problem Relation Age of Onset    Lipids Mother     Hypertension Mother     Thyroid Disease Mother     Depression Mother     Angina Mother     GERD Mother     Skin Cancer Mother     Migraines Mother     Autoimmune Disease Mother     Hyperlipidemia Mother     Mental Illness Mother     Cerebrovascular Disease Mother         11/2023    Other Cancer Mother         skin-basil cell    Anxiety Disorder Mother     Post Operative Nausea and Vomiting Mother     Eye Disorder Father         cataract, detached retina    Myocardial Infarction Father 60    Lipids Father     Cerebrovascular Disease Father     Depression Father     Substance Abuse Father     Anesthesia Reaction Father         stroke right after surgery    Cataracts Father     Osteoarthritis Father     Ulcerative Colitis Father     Autoimmune Disease Father     Heart Disease Father     Hyperlipidemia Father     Mental Illness Father     Other Cancer Father         myloma    Anxiety Disorder Father     Interpersonal Violence Sister     Coronary Artery Disease Sister         angioplasty    GERD Sister     Substance Abuse Sister     Cerebrovascular Disease Sister         2005    Depression Sister     Thyroid Disease Sister     Autoimmune Disease Sister     Depression Sister     Mental Illness Sister     Substance Abuse Sister     Thyroid Disease Sister     Eye Disorder Maternal Grandmother         cataract    Thyroid Disease Maternal Grandmother     Diabetes Maternal Grandfather     Eye Disorder Paternal Grandmother         cataract    Diabetes Paternal Grandmother     Eye Disorder Paternal Grandfather         cataract    Diabetes Paternal  Grandfather     Substance Abuse Paternal Grandfather     Eye Disorder Son         ptosis    Depression Son     Anxiety Disorder Son     Depression Son     Mental Illness Son     Anxiety Disorder Son     Heart Disease Paternal Aunt     Diabetes Paternal Aunt     Diabetes Paternal Uncle     Heart Disease Paternal Uncle     Depression Nephew     Anxiety Disorder Nephew     Thyroid Disease Nephew     Thyroid Disease Nephew     Anxiety Disorder Nephew     Diabetes Type 2  Cousin         paternal cousin    Autoimmune Disease Cousin        Social History:  Social History     Social History Narrative     with 3 children and a dog.  No smoking, etoh or drug use.  Worked as a  for [employer] in [employer] in the past.  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  BMI= There is no height or weight on file to calculate BMI.  General:  Appearance is normal, no acute distress  HEENT:  NC/AT, sclera appear white  Neck:  No obvious thyromegaly  CV/Lungs:  Non distressed breathing  Skin:  No apparent rashes  Neuro:  Normal mental status  Psych:  Normal affect

## 2025-02-20 NOTE — LETTER
2/20/2025      Dinora Mcghee  [address] [facility] [address]      Dear Colleague,    Thank you for referring your patient, Dinora Mcghee, to the [facility]. Please see a copy of my visit note below.    Dinora is a 59 year old who is being evaluated via a billable video visit.          Video-Visit Details    Type of service:  Video Visit   Originating Location (pt. Location): Home    Distant Location (provider location):  On-site  Platform used for Video Visit: Emeli  Signed Electronically by: Gloria Melissa MD        Video duration: 14 minutes    [facility]  Islet Autotransplant, Diabetes Follow Up    Problem List:  Patient Active Problem List   Diagnosis     Hypothyroidism     Need for prophylactic immunotherapy     Sprain and strain of other specified sites of hip and thigh     Chondromalacia of patella     Sensorineural hearing loss     Vertiginous syndrome and labyrinthine disorder     Lumbago     Allergic rhinitis due to other allergen     GERD (gastroesophageal reflux disease)     Other type of intractable migraine     History of ERCP     Abdominal pain     S/P ERCP     Post-pancreatectomy diabetes (H)     Pancreatic insufficiency     Gastroparesis     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     Incisional hernia, without obstruction or gangrene     Adhesive capsulitis of shoulder, unspecified laterality     S/P hernia repair     Headache, chronic migraine without aura     Chronic pain syndrome     Iron deficiency anemia     Hypoglycemia     Adult failure to thrive     Abdominal pain, generalized     Gastrostomy tube obstruction (H)     Intra-abdominal abscess (H)     Postprocedural intraabdominal abscess (H)     Acquired absence of spleen     Depressive disorder     Diabetes insipidus     Other specified abnormal immunological findings in serum     Poisoning by drug     Sphincter of Oddi dysfunction     Type 1 diabetes mellitus without complication  (H)     Myofascial pain     Feeding intolerance     Acute postoperative abdominal pain     Major depression, single episode     History of food intolerance     Malnutrition, unspecified type     Nausea and vomiting, unspecified vomiting type     On total parenteral nutrition (TPN)     Fever, unspecified fever cause     Chronic pancreatitis, unspecified pancreatitis type (H)     Cholangitis (H)     Abdominal pain, unspecified abdominal location     Bacteremia     Constipation     Right upper quadrant abdominal pain     S/P pancreatic islet cell transplantation (H)     Dislodged jejunostomy tube           Assessment:  1.  Post pancreatectomy diabetes mellitus, s/p total pancreatectomy and islet autotransplant.  (ICD-10 E89.1)  2.  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   Gastroparesis  4.  Nausea, vomiting      Dinora is a 59 year old with history of chronic pancreatitis who is s/p total pancreatectomy and islet autotransplant.  She has been on and off insulin with A1c in goal range in the past, but more recently has been consistently needing low dose insulin.  Major interval changes complicating diabetes management include: recent colectomy (Oct 2024), TPN use, enteral feeds.  She was also admitted with sepsis in 11/2024 with increased insulin needs then, albeit still on a low dose.    She is doing much better still from a GI perspective, entirely on an oral diet despite gastroparesis.  With transitioning to oral diet, she has needed more subcutaneous insulin and has variability including highs and lows around meals.  Meal sizes are uncertain with gastroparesis so pre-meal bolusing is a challenge. She is now getting more active again and has lows with activity.  Overall I think that her best treatment option is a closed loop pump at this point, which may be better for reducing hypoglycemia  around exercise, and also controlling post meal BGs that are complicated by her gastroparesis.  She is on a low insulin dose so also has the advantage of more precise dosing.      Also needs to get back on vitamins now that she is off TPN.  Will have labs in April.      Plan:  1.  Changes to current diabetes regimen:  Patient Instructions   1)  I think our best next step for dealing with post meal hyper and hypoglycemia and lows with exercise is to move to an automated insulin pump system that will work with a compatible CGM to allow some adjustment of background insulin automatically.    2)  Diabetes ed referral placed for pre-pump education.    3)  Continue same insulin plan for now.    4)  Restart on MVW vitamin from [employer]     5)  You will see us in April and have labs at that time.       2.  Frequency of blood sugar checks:  Keep wearing Yandy-- this is much better for Dinora than fingersticks because with fingersticks we miss the really critical data of the post prandial excursions.    3.  Continue routine follow up for autoislet transplant patients:  Mixed meal test (6 mL/kg BoostHP to max of 360 mL) at 3 months, 6 months, and once a year post transplant.  Hemoglobin A1c levels at these time points and quarterly.    4.  Other issues addressed today:  none    Follow up:  4 mos    Contact me for questions at [phone] or [phone].  Emergency number to reach pediatric endocrinology after hours is [phone].        Dr. Gloria Melissa MD  [facility]  Director of Research, Islet Auto-transplant Program  Phone:  [phone]  Electronically signed: February 20, 2025 at 2:30 PM      30 minutes spent by me on the date of the encounter doing chart review, history and exam, documentation and further activities per the note         The longitudinal plan of care for the diagnosis(es)/condition(s) as documented were addressed during this visit. Due to the  added complexity in care, I will continue to support Dinora in the subsequent management and with ongoing continuity of care.        HPI:  Dinora is a 59 year old  female here for follow up oflaparoscopic assisted total pancreatectomy, islet cell autotransplant, splenectomy, gastrojejunostomy tube revision, choledochoduodenostomy, duodenojejunostomy, Vera-Y reconstruction performed on 2016.  At the time of the procedure, the patient received:  Total Islet number: [id number].  Total Islet number/k.  Islet equivalents: [id number]  Islet equivalents/kilogram: 4091    Post-surgical course was complicated by two minor procedures for a retained foreign body near GJ site in 2017 and hernia repair 2018, and severe gastroparesis (refractory to J-feeds, domperidone).  She was initially insulin independent at about 1 year after surgery.    - Gastroparesis and GI dysmotility is her major issue post TPIAT. Admitted multiple times. GJ was helpful for short time but did not tolerate well.  Has tried multiple other approaches and continues to worsen, nothing offering benefit.  Tried expanded access domperidone but not helpful  - Also has hypoglycemia (prior treatments SGLT2 inhibitor not helpful, acarbose is helpful when eating, also uses mini glucagon).   -- Multiple failures of GJ feeding tubes.  -- Most recently had total colectomy and ostomy for severe slow transit constipation 2024, with gram negative sepsis 24      At today's visit,   --  Ann central line changed over to a port.  Gets IVF at home every 2-3 days, otherwise all oral diet. Eating pretty well, expanding food options, still complicated by some gastroparesis but better.  -- Not on a multivitamin since coming off TPN  -- Generally getting more active- concern around hypoglycemia however, with activity.  -- Diabetes management complicated by post meal highs and lows, and the exercise lows.       Diabetes history:  Current insulin  regimen:  Glargine, 6 units daily.  Humalog, getting yousif pen -- 0.5 unit per 10 grams with meals.   Covers post meal b/c of ongoing uncertainty of regarding how much her stomach can handle.       Wearing Yandy- pattern = post prandial hyperglycemia                Recent hemoglobin A1c levels:      Lab Results   Component Value Date    A1C 5.4 07/23/2024    A1C 5.6 03/20/2024    A1C 5.4 02/17/2022    A1C 5.2 11/27/2021    A1C 5.5 09/18/2021    A1C 5.4 08/05/2021    A1C 5.7 05/12/2021    A1C 5.9 04/01/2021    A1C 5.5 12/17/2020    A1C 5.8 07/30/2020       Order: [id number]  Component  Ref Range & Units 2 mo ago   Hemoglobin A1c, B  4.2 - 5.6 % 6.2 High    Comment: Hemoglobin A1c values of 5.7-6.4 percent indicate an  increased risk for developing diabetes mellitus. In  diabetic patients, HbA1c goals should be discussed with  healthcare provider.   Resulting Agency [facility]     Specimen Collected: 11/25/24 10:45 AM    Performed by: [facility] Last Resulted: 11/25/24 12:06 PM   Received From: [facility]  Result Received: 11/25/24  2:27 PM       Hypoglycemia history: mild lows but rapid falls. The patient has had 0 episodes of severe hypoglycemia (seizure, coma, or neuroglycopenic symptoms severe enough to require assistance from another person).  Blood sugars were reviewed from the patient records and/or the meter download.          Review of systems:  A complete ROS is negative except as noted in HPI above.      Past Medical and Surgical History:  Past Medical History:   Diagnosis Date     Allergic rhinitis, cause unspecified      Allergy to other foods     strawberries, apples, celeries, alice, watermelon     Arthritis     left knee     Choledocholithiasis     long after cholecystectomy     Chronic abdominal pain      Chronic constipation      Chronic nausea      Chronic pancreatitis (H)      Degeneration of lumbar or lumbosacral intervertebral disc      Esophageal reflux     w/ hiatal  hernia     Gastroparesis      Hiatal hernia      History of pituitary adenoma     s/p resection     Hypothyroidism      Migraines      Mild hyperlipidemia      On tube feeding diet     presence of GJ tube     Pancreatic disease     PD stricture, 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      PONV (postoperative nausea and vomiting)      Portacath in place      Type 1 diabetes mellitus without complication (H) 2022     Unspecified hearing loss     25% high frequency R     Past Surgical History:   Procedure Laterality Date     ABDOMEN SURGERY  1999    c sections: 93, 96, 98. endometriosis growth     APPENDECTOMY        SECTION  1993     COLONOSCOPY  2014     COLONOSCOPY N/A 2023    Procedure: COLONOSCOPY, WITH POLYPECTOMY AND BIOPSY;  Surgeon: Percy Chamorro MD;  [facility]     ENDOSCOPIC INSERTION TUBE GASTROSTOMY N/A 2021    Procedure: Gastrojejonostomy placement with jejunopexy, PEG tube exchange;  Surgeon: Zackery Montoya MD;  [facility]     ENDOSCOPIC RETROGRADE CHOLANGIOPANCREATOGRAM N/A 2015    Procedure: ENDOSCOPIC RETROGRADE CHOLANGIOPANCREATOGRAM;  Surgeon: Mandeep Park MD;  [facility]     ENDOSCOPIC RETROGRADE CHOLANGIOPANCREATOGRAM N/A 2016    Procedure: COMBINED ENDOSCOPIC RETROGRADE CHOLANGIOPANCREATOGRAPHY, PLACE TUBE/STENT;  Surgeon: Mandeep Park MD;  [facility]     ENDOSCOPIC RETROGRADE CHOLANGIOPANCREATOGRAM N/A 2016    Procedure: COMBINED ENDOSCOPIC RETROGRADE CHOLANGIOPANCREATOGRAPHY, REMOVE FOREIGN BODY OR STENT/TUBE;  Surgeon: Mandeep Park MD;  [facility]     ENDOSCOPIC RETROGRADE CHOLANGIOPANCREATOGRAM N/A 2016    Procedure: COMBINED ENDOSCOPIC RETROGRADE CHOLANGIOPANCREATOGRAPHY, PLACE TUBE/STENT;  Surgeon: Mandeep Park MD;  [facility]     ENDOSCOPIC RETROGRADE CHOLANGIOPANCREATOGRAM N/A 2016    Procedure: COMBINED ENDOSCOPIC  RETROGRADE CHOLANGIOPANCREATOGRAPHY, REMOVE FOREIGN BODY OR STENT/TUBE;  Surgeon: Mandeep Park MD;  [facility]     ENDOSCOPIC ULTRASOUND UPPER GASTROINTESTINAL TRACT (GI) N/A 10/03/2016    Procedure: ENDOSCOPIC ULTRASOUND, ESOPHAGOSCOPY / UPPER GASTROINTESTINAL TRACT (GI);  Surgeon: Guru Jose Rodas MD;  [facility]     ENT SURGERY  1989    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     ENTEROSCOPY SMALL BOWEL N/A 02/03/2022    Procedure: ENTEROSCOPY with possible fistula closure;  Surgeon: Francisco Dodson MD;  Location:  GI     ENTEROSCOPY SMALL BOWEL N/A 9/11/2024    Procedure: Upper endoscopy, gastrostomy tube placement and jejunostomy tube exchange;  Surgeon: Zackery Montoya MD;  [facility]     ESOPHAGOSCOPY, GASTROSCOPY, DUODENOSCOPY (EGD), COMBINED N/A 06/24/2015    Procedure: COMBINED ESOPHAGOSCOPY, GASTROSCOPY, DUODENOSCOPY (EGD), REMOVE FOREIGN BODY;  Surgeon: Mandeep Park MD;  [facility]     ESOPHAGOSCOPY, GASTROSCOPY, DUODENOSCOPY (EGD), COMBINED N/A 10/25/2015    Procedure: COMBINED ESOPHAGOSCOPY, GASTROSCOPY, DUODENOSCOPY (EGD);  Surgeon: Sammy Amaro MD;  [facility]     ESOPHAGOSCOPY, GASTROSCOPY, DUODENOSCOPY (EGD), COMBINED N/A 10/25/2015    Procedure: COMBINED ESOPHAGOSCOPY, GASTROSCOPY, DUODENOSCOPY (EGD), BIOPSY SINGLE OR MULTIPLE;  Surgeon: Sammy Amaro MD;  [facility]     ESOPHAGOSCOPY, GASTROSCOPY, DUODENOSCOPY (EGD), COMBINED N/A 01/31/2023    Procedure: ESOPHAGOGASTRODUODENOSCOPY (EGD) with peg replacement ;  Surgeon: Mandeep Park MD;  [facility]     ESOPHAGOSCOPY, GASTROSCOPY, DUODENOSCOPY (EGD), COMBINED N/A 7/24/2024    Procedure: Esophagoscopy, gastroscopy, duodenoscopy (EGD), combined;  Surgeon: Zackery Montoya MD;  [facility]     ESOPHAGOSCOPY, GASTROSCOPY, DUODENOSCOPY (EGD), COMBINED N/A 10/31/2024    Procedure: Esophagoscopy, gastroscopy, duodenoscopy (EGD), combined;   Surgeon: Mandeep Park MD;  [facility]     ESOPHAGOSCOPY, GASTROSCOPY, DUODENOSCOPY (EGD), DILATATION, COMBINED       EXCISE LESION TRUNK N/A 04/17/2017    Procedure: EXCISE LESION TRUNK;  Removal of Abdominal Foreign Body;  Surgeon: Nestor Phoenix MD;  [facility]     HC ESOPH/GAS REFLUX TEST W NASAL IMPED >1 HR N/A 11/19/2015    Procedure: ESOPHAGEAL IMPEDENCE FUNCTION TEST WITH 24 HOUR PH GREATER THAN 1 HOUR;  Surgeon: Thiago Apple MD;  [facility]     HC UGI ENDOSCOPY DIAG W BIOPSY  09/17/2008     HC UGI ENDOSCOPY DIAG W BIOPSY  09/27/2012     HC UGI ENDOSCOPY W ESOPHAGEAL DILATION BALLOON <30MM  09/17/2008     HC UGI ENDOSCOPY W EUS N/A 05/05/2015    Procedure: COMBINED ENDOSCOPIC ULTRASOUND, ESOPHAGOSCOPY, GASTROSCOPY, DUODENOSCOPY (EGD);  Surgeon: Wm Dueñas MD;  [facility]     HC WRIST ARTHROSCOP,RELEASE XVERS LIG Bilateral 12/17/2008     INJECT TRANSVERSUS ABDOMINIS PLANE (TAP) BLOCK BILATERAL Left 09/22/2016    Procedure: INJECT TRANSVERSUS ABDOMINIS PLANE (TAP) BLOCK BILATERAL;  Surgeon: Dickson Corrigan MD;  [facility]     INJECT TRIGGER POINT Bilateral 09/08/2022    Procedure: Abdominal trigger point injection with ultrasound;  Surgeon: Monika Mahajan MD;  [facility]     INJECT TRIGGER POINT SINGLE / MULTIPLE 3 OR MORE MUSCLES Right 11/15/2022    Procedure: Trigger point injections right abdomen with ultrasound guidance;  Surgeon: Monika Mahajan MD;  [facility]     IR CHEST PORT PLACEMENT < 5 YRS OF AGE  06/10/2022     IR CHEST PORT PLACEMENT > 5 YRS OF AGE  1/29/2025     IR CVC TUNNEL PLACEMENT > 5 YRS OF AGE  4/15/2024     IR CVC TUNNEL REMOVAL RIGHT  1/29/2025     IR PORT REMOVAL RIGHT  11/09/2023     LAPAROSCOPIC ASSISTED COLECTOMY N/A 10/11/2024    Procedure: Exploratory laparotomy, extensive lysis of adhesions, revision of Jtube and small bowel resection, TOTAL ABDOMINAL COLECTOMY WITH ILEORECTAL ANASTAMOSIS, DIVERTING LOOP  ILEOSTOMY, flexible sigmoidoscopy;  Surgeon: Kaylene Branch MD;  [facility]     laparoscopic pineda  01/01/1995     LAPAROSCOPIC HERNIORRHAPHY INCISIONAL N/A 08/23/2018    Procedure: LAPAROSCOPIC HERNIORRHAPHY INCISIONAL;  Laparoscopic Incisional Hernia Repair with Symbotex Mesh Implant;  Surgeon: Nestor Phoenix MD;  [facility]     LAPAROSCOPIC PANCREATECTOMY, TRANSPLANT AUTO ISLET CELL N/A 12/28/2016    Procedure: LAPAROSCOPIC PANCREATECTOMY, TRANSPLANT AUTO ISLET CELL;  Surgeon: Nestor Phoenix MD;  [facility]     LAPAROTOMY EXPLORATORY N/A 01/31/2023    Procedure: Exploratory Laparotomy, lysis of adhesions, Perforated J-Junostomy Resection, Replace J-Junostomy site;  Surgeon: Elver Bauer MD;  [facility]     LAPAROTOMY, LYSIS ADHESIONS, COMBINED N/A 01/31/2023    Procedure: Dilatation of jejunostomy feeding tube, track with placement of jejunostomy tube with fluoroscopy;  Surgeon: Elver Bauer MD;  [facility]     PICC DOUBLE LUMEN PLACEMENT Left 11/13/2023    Basilic Vein 5F DL 43 cm     REMOVE AND REPLACE BREAST IMPLANT PROSTHESIS N/A 12/30/2022    Procedure: Exploratory Laparotomy, lysis of adhesions, small bowel resection. Placement of gastric jejunostomy for enteral feeding.;  Surgeon: Elver Bauer MD;  [facility]     REMOVE GASTROSTOMY TUBE ADULT N/A 01/28/2022    Procedure: REMOVAL, GASTROSTOMY TUBE, ADULT;  Surgeon: Mandeep Park MD;  [facility]     REMOVE GASTROSTOMY TUBE ADULT N/A 12/2/2024    Procedure: Remove gastrostomy tube adult- PEG;  Surgeon: Mandeep Park MD;  [facility]     REPLACE GASTROJEJUNOSTOMY TUBE, PERCUTANEOUS N/A 09/07/2021    Procedure: GASTROJEJUNOSTOMY TUBE PLACEMENT, PERCUTANEOUS, WITH GASTROPEXY;  Surgeon: Mandeep Park MD;  [facility]     REPLACE GASTROJEJUNOSTOMY TUBE, PERCUTANEOUS N/A 09/22/2021    Procedure: REPLACEMENT, GASTROJEJUNOSTOMY TUBE, PERCUTANEOUS;   Surgeon: Zackery Montoya MD;  [facility]     REPLACE GASTROJEJUNOSTOMY TUBE, PERCUTANEOUS N/A 11/22/2022    Procedure: REPLACEMENT, GASTROJEJUNOSTOMY TUBE, PERCUTANEOUS;  Surgeon: Mandeep Park MD;  [facility]     REPLACE GASTROJEJUNOSTOMY TUBE, PERCUTANEOUS N/A 12/09/2022    Procedure: REPLACEMENT, GASTROJEJUNOSTOMY TUBE, PERCUTANEOUS with ENDOSCOPIC SUTURING.;  Surgeon: Mandeep Park MD;  [facility]     REPLACE GASTROJEJUNOSTOMY TUBE, PERCUTANEOUS N/A 08/01/2023    Procedure: Replace Gastrojejunostomy Tube, Percutaneous;  Surgeon: Mandeep Park MD;  [facility]     REPLACE GASTROJEJUNOSTOMY TUBE, PERCUTANEOUS N/A 11/11/2023    Procedure: Replace Gastrojejunostomy Tube, Percutaneous;  Surgeon: Francisco Dodson MD;  [facility]     REPLACE GASTROJEJUNOSTOMY TUBE, PERCUTANEOUS N/A 12/8/2023    Procedure: Replace Gastrojejunostomy Tube, Percutaneous;  Surgeon: Mandeep Park MD;  [facility]     REPLACE GASTROSTOMY TUBE, PERCUTANEOUS N/A 10/31/2024    Procedure: REPLACEMENT, GASTROSTOMY TUBE, PERCUTANEOUS;  Surgeon: Mandeep Park MD;  [facility]     REPLACE JEJUNOSTOMY TUBE, PERCUTANEOUS N/A 09/10/2021    Procedure: UPPER ENDOSCOPY, REPLACEMENT OF PERCUTANEOUS GASTROJEJUNOSTOMY TUBE, T-TAG GASTROPEXY;  Surgeon: Zackery Montoya MD;  [facility]     REPLACE JEJUNOSTOMY TUBE, PERCUTANEOUS N/A 12/29/2021    Procedure: REPLACEMENT, JEJUNOSTOMY TUBE, PERCUTANEOUS;  Surgeon: Mandeep Park MD;  [facility]     REPLACE JEJUNOSTOMY TUBE, PERCUTANEOUS N/A 05/04/2023    Procedure: REPLACEMENT, JEJUNOSTOMY TUBE, PERCUTANEOUS;  Surgeon: Mandeep Park MD;  [facility]     REPLACE JEJUNOSTOMY TUBE, PERCUTANEOUS  08/01/2023    Procedure: Replace Jejunostomy Tube, Percutaneous;  Surgeon: Mandeep Park MD;  [facility]     REPLACE JEJUNOSTOMY TUBE, PERCUTANEOUS N/A 4/16/2024    Procedure: REPLACEMENT,  JEJUNOSTOMY TUBE, PERCUTANEOUS;  Surgeon: Francisco Dodson MD;  [facility]     REPLACE JEJUNOSTOMY TUBE, PERCUTANEOUS N/A 10/31/2024    Procedure: REPLACEMENT, JEJUNOSTOMY TUBE, PERCUTANEOUS;  Surgeon: Mandeep Park MD;  [facility]     transphenoidal pituitary resection  1990     [facility]  DELIVERY ONLY  1996     [facility]  DELIVERY ONLY  1998    repeat c section with incidental cystotomy with repair     [facility] EXCIS PITUITARY,TRANSNASAL/SEPTAL  1980    pituitary tumor removed for diabetes insipidus     [facility] TOTAL ABDOM HYSTERECTOMY  1999    w/ bilateral salpingoophorectomy        Family History:  New changes since last visit:  none  Family History   Problem Relation Age of Onset     Lipids Mother      Hypertension Mother      Thyroid Disease Mother      Depression Mother      Angina Mother      GERD Mother      Skin Cancer Mother      Migraines Mother      Autoimmune Disease Mother      Hyperlipidemia Mother      Mental Illness Mother      Cerebrovascular Disease Mother         2023     Other Cancer Mother         skin-basil cell     Anxiety Disorder Mother      Post Operative Nausea and Vomiting Mother      Eye Disorder Father         cataract, detached retina     Myocardial Infarction Father 60     Lipids Father      Cerebrovascular Disease Father      Depression Father      Substance Abuse Father      Anesthesia Reaction Father         stroke right after surgery     Cataracts Father      Osteoarthritis Father      Ulcerative Colitis Father      Autoimmune Disease Father      Heart Disease Father      Hyperlipidemia Father      Mental Illness Father      Other Cancer Father         myloma     Anxiety Disorder Father      Interpersonal Violence Sister      Coronary Artery Disease Sister         angioplasty     GERD Sister      Substance Abuse Sister      Cerebrovascular Disease Sister         2005     Depression Sister      Thyroid Disease Sister      Autoimmune  Disease Sister      Depression Sister      Mental Illness Sister      Substance Abuse Sister      Thyroid Disease Sister      Eye Disorder Maternal Grandmother         cataract     Thyroid Disease Maternal Grandmother      Diabetes Maternal Grandfather      Eye Disorder Paternal Grandmother         cataract     Diabetes Paternal Grandmother      Eye Disorder Paternal Grandfather         cataract     Diabetes Paternal Grandfather      Substance Abuse Paternal Grandfather      Eye Disorder Son         ptosis     Depression Son      Anxiety Disorder Son      Depression Son      Mental Illness Son      Anxiety Disorder Son      Heart Disease Paternal Aunt      Diabetes Paternal Aunt      Diabetes Paternal Uncle      Heart Disease Paternal Uncle      Depression Nephew      Anxiety Disorder Nephew      Thyroid Disease Nephew      Thyroid Disease Nephew      Anxiety Disorder Nephew      Diabetes Type 2  Cousin         paternal cousin     Autoimmune Disease Cousin        Social History:  Social History     Social History Narrative     with 3 children and a dog.  No smoking, etoh or drug use.  Worked as a  for [employer] in [place] in the past.  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  BMI= There is no height or weight on file to calculate BMI.  General:  Appearance is normal, no acute distress  HEENT:  NC/AT, sclera appear white  Neck:  No obvious thyromegaly  CV/Lungs:  Non distressed breathing  Skin:  No apparent rashes  Neuro:  Normal mental status  Psych:  Normal affect      Again, thank you for allowing me to participate in the care of your patient.        Sincerely,        Gloria Melissa MD    Electronically signed

## 2025-02-21 PROCEDURE — S9379 HIT NOC PER DIEM: HCPCS

## 2025-02-22 PROCEDURE — S9379 HIT NOC PER DIEM: HCPCS

## 2025-02-23 PROCEDURE — S9379 HIT NOC PER DIEM: HCPCS

## 2025-02-24 DIAGNOSIS — E03.9 HYPOTHYROIDISM, UNSPECIFIED TYPE: ICD-10-CM

## 2025-02-24 PROCEDURE — S9379 HIT NOC PER DIEM: HCPCS

## 2025-02-25 PROCEDURE — S9379 HIT NOC PER DIEM: HCPCS

## 2025-02-26 ENCOUNTER — DOCUMENTATION ONLY (OUTPATIENT)
Dept: INTERNAL MEDICINE | Facility: CLINIC | Age: 60
End: 2025-02-26
Payer: COMMERCIAL

## 2025-02-26 PROCEDURE — S9379 HIT NOC PER DIEM: HCPCS

## 2025-02-26 NOTE — PROGRESS NOTES
Type of Form Received: [place] Discharge Summary and Med List    Form Received (Date) 2/25/25   Form Filled out No   Placed in provider folder Yes

## 2025-02-27 ENCOUNTER — HOME INFUSION BILLING (OUTPATIENT)
Dept: HOME HEALTH SERVICES | Facility: HOME HEALTH | Age: 60
End: 2025-02-27
Payer: COMMERCIAL

## 2025-02-27 PROCEDURE — S9379 HIT NOC PER DIEM: HCPCS

## 2025-02-28 PROCEDURE — S9379 HIT NOC PER DIEM: HCPCS

## 2025-03-03 RX ORDER — LEVOTHYROXINE SODIUM 175 UG/1
175 TABLET ORAL DAILY
Qty: 90 TABLET | Refills: 0 | Status: SHIPPED | OUTPATIENT
Start: 2025-03-03

## 2025-03-03 NOTE — TELEPHONE ENCOUNTER
LEVOTHYROXINE 0.175MG (175MCG) TABS   Last Written Prescription:  12/2/24  #90, 0 refills  ----------------------  Last Visit Date: 1/8/24  Future Visit Date: 3/12/25  ----------------------      Refill decision: Medication refilled per  Medication Refill in Ambulatory Care  policy.     []  Supervision: no future appointment scheduled. Scheduling has been notified to contact the pt for appointment.      Refill decision: Medication unable to be refilled by RN due to:  Abnormal labs/test:        Request from pharmacy:  Requested Prescriptions   Pending Prescriptions Disp Refills    levothyroxine (SYNTHROID/LEVOTHROID) 175 MCG tablet [Pharmacy Med Name: LEVOTHYROXINE 0.175MG (175MCG) TABS] 90 tablet 0     Sig: TAKE 1 TABLET(175 MCG) BY MOUTH DAILY       Thyroid Protocol Failed - 3/3/2025 10:30 AM        Failed - Normal TSH on file in past 12 months     Recent Labs   Lab Test 11/06/24  1836   TSH 6.62*              Passed - Patient is 12 years or older        Passed - Medication is active on med list and the sig matches. RN to manually verify dose and sig if red X/fail.     If the protocol passes (green check), you do not need to verify med dose and sig.    A prescription matches if they are the same clinical intention.    For Example: once daily and every morning are the same.    For all fails (red x), verify dose and sig.    If the refill does match what is on file, the RN can still proceed to approve the refill request.     If they do not match, route to the appropriate provider.             Passed - Recent (12 mo) or future (90 days) visit within the authorizing provider's specialty     The patient must have completed an in-person or virtual visit within the past 12 months or has a future visit scheduled within the next 90 days with the authorizing provider s specialty.  Urgent care and e-visits do not qualify as an office visit for this protocol.          Passed - Medication indicated for associated diagnosis      Medication is associated with one or more of the following diagnoses:  Hypothyroidism  Thyroid stimulating hormone suppression therapy  Thyroid cancer  Acquired atrophy of thyroid          Passed - No active pregnancy on record     If patient is pregnant or has had a positive pregnancy test, please check TSH.          Passed - No positive pregnancy test in past 12 months     If patient is pregnant or has had a positive pregnancy test, please check TSH.

## 2025-03-03 NOTE — TELEPHONE ENCOUNTER
Thyroid Protocol Failed  Rerun Protocol (3/3/2025 10:32 AM)    Normal TSH on file in past 12 months        Recent Labs   Lab Test 11/06/24  1836   TSH 6.62*

## 2025-03-06 ENCOUNTER — HOME INFUSION (OUTPATIENT)
Dept: HOME HEALTH SERVICES | Facility: HOME HEALTH | Age: 60
End: 2025-03-06
Payer: COMMERCIAL

## 2025-03-07 ENCOUNTER — HOME INFUSION BILLING (OUTPATIENT)
Dept: HOME HEALTH SERVICES | Facility: HOME HEALTH | Age: 60
End: 2025-03-07
Payer: COMMERCIAL

## 2025-03-07 PROCEDURE — A4215 STERILE NEEDLE: HCPCS

## 2025-03-08 PROCEDURE — S9379 HIT NOC PER DIEM: HCPCS

## 2025-03-08 SDOH — HEALTH STABILITY: PHYSICAL HEALTH: ON AVERAGE, HOW MANY MINUTES DO YOU ENGAGE IN EXERCISE AT THIS LEVEL?: 40 MIN

## 2025-03-08 SDOH — HEALTH STABILITY: PHYSICAL HEALTH: ON AVERAGE, HOW MANY DAYS PER WEEK DO YOU ENGAGE IN MODERATE TO STRENUOUS EXERCISE (LIKE A BRISK WALK)?: 4 DAYS

## 2025-03-08 ASSESSMENT — SOCIAL DETERMINANTS OF HEALTH (SDOH): HOW OFTEN DO YOU GET TOGETHER WITH FRIENDS OR RELATIVES?: MORE THAN THREE TIMES A WEEK

## 2025-03-09 PROCEDURE — S9379 HIT NOC PER DIEM: HCPCS

## 2025-03-10 PROCEDURE — S9379 HIT NOC PER DIEM: HCPCS

## 2025-03-11 PROCEDURE — S9379 HIT NOC PER DIEM: HCPCS

## 2025-03-12 ENCOUNTER — OFFICE VISIT (OUTPATIENT)
Dept: INTERNAL MEDICINE | Facility: CLINIC | Age: 60
End: 2025-03-12
Payer: COMMERCIAL

## 2025-03-12 ENCOUNTER — LAB (OUTPATIENT)
Dept: LAB | Facility: CLINIC | Age: 60
End: 2025-03-12
Payer: COMMERCIAL

## 2025-03-12 VITALS
HEART RATE: 88 BPM | RESPIRATION RATE: 16 BRPM | DIASTOLIC BLOOD PRESSURE: 72 MMHG | SYSTOLIC BLOOD PRESSURE: 107 MMHG | HEIGHT: 68 IN | OXYGEN SATURATION: 100 % | TEMPERATURE: 98.1 F | WEIGHT: 149.8 LBS | BODY MASS INDEX: 22.7 KG/M2

## 2025-03-12 DIAGNOSIS — E03.9 HYPOTHYROIDISM, UNSPECIFIED TYPE: Primary | ICD-10-CM

## 2025-03-12 DIAGNOSIS — R11.2 NAUSEA AND VOMITING, UNSPECIFIED VOMITING TYPE: ICD-10-CM

## 2025-03-12 DIAGNOSIS — Z87.19 HISTORY OF FOOD INTOLERANCE: ICD-10-CM

## 2025-03-12 DIAGNOSIS — G89.4 CHRONIC PAIN SYNDROME: ICD-10-CM

## 2025-03-12 DIAGNOSIS — R25.2 CRAMP OF LIMB: ICD-10-CM

## 2025-03-12 DIAGNOSIS — E03.9 HYPOTHYROIDISM, UNSPECIFIED TYPE: ICD-10-CM

## 2025-03-12 DIAGNOSIS — E46 MALNUTRITION, UNSPECIFIED TYPE: ICD-10-CM

## 2025-03-12 DIAGNOSIS — A41.9 SEPSIS WITHOUT ACUTE ORGAN DYSFUNCTION, DUE TO UNSPECIFIED ORGANISM (H): ICD-10-CM

## 2025-03-12 DIAGNOSIS — F43.21 SITUATIONAL DEPRESSION: ICD-10-CM

## 2025-03-12 DIAGNOSIS — N95.2 ATROPHIC VAGINITIS: ICD-10-CM

## 2025-03-12 DIAGNOSIS — F51.01 PRIMARY INSOMNIA: ICD-10-CM

## 2025-03-12 DIAGNOSIS — R62.7 ADULT FAILURE TO THRIVE: ICD-10-CM

## 2025-03-12 DIAGNOSIS — F41.9 ANXIETY: ICD-10-CM

## 2025-03-12 LAB
ANION GAP SERPL CALCULATED.3IONS-SCNC: 9 MMOL/L (ref 7–15)
BUN SERPL-MCNC: 11.9 MG/DL (ref 6–20)
CALCIUM SERPL-MCNC: 9.4 MG/DL (ref 8.8–10.4)
CHLORIDE SERPL-SCNC: 102 MMOL/L (ref 98–107)
CREAT SERPL-MCNC: 0.76 MG/DL (ref 0.51–0.95)
EGFRCR SERPLBLD CKD-EPI 2021: 90 ML/MIN/1.73M2
GLUCOSE SERPL-MCNC: 103 MG/DL (ref 70–99)
HCO3 SERPL-SCNC: 27 MMOL/L (ref 22–29)
MAGNESIUM SERPL-MCNC: 2.1 MG/DL (ref 1.7–2.3)
POTASSIUM SERPL-SCNC: 4.6 MMOL/L (ref 3.4–5.3)
SODIUM SERPL-SCNC: 138 MMOL/L (ref 135–145)
TSH SERPL DL<=0.005 MIU/L-ACNC: 1.65 UIU/ML (ref 0.3–4.2)

## 2025-03-12 PROCEDURE — 83735 ASSAY OF MAGNESIUM: CPT | Performed by: PATHOLOGY

## 2025-03-12 PROCEDURE — 80048 BASIC METABOLIC PNL TOTAL CA: CPT | Performed by: PATHOLOGY

## 2025-03-12 PROCEDURE — S9379 HIT NOC PER DIEM: HCPCS

## 2025-03-12 PROCEDURE — 84443 ASSAY THYROID STIM HORMONE: CPT | Performed by: PATHOLOGY

## 2025-03-12 PROCEDURE — 3074F SYST BP LT 130 MM HG: CPT | Performed by: INTERNAL MEDICINE

## 2025-03-12 PROCEDURE — 84630 ASSAY OF ZINC: CPT | Mod: 90 | Performed by: PATHOLOGY

## 2025-03-12 PROCEDURE — 99214 OFFICE O/P EST MOD 30 MIN: CPT | Performed by: INTERNAL MEDICINE

## 2025-03-12 PROCEDURE — 3078F DIAST BP <80 MM HG: CPT | Performed by: INTERNAL MEDICINE

## 2025-03-12 PROCEDURE — 36415 COLL VENOUS BLD VENIPUNCTURE: CPT | Performed by: PATHOLOGY

## 2025-03-12 PROCEDURE — 99000 SPECIMEN HANDLING OFFICE-LAB: CPT | Performed by: PATHOLOGY

## 2025-03-12 RX ORDER — PROCHLORPERAZINE MALEATE 10 MG
10 TABLET ORAL EVERY 6 HOURS PRN
Qty: 120 TABLET | Refills: 3 | Status: SHIPPED | OUTPATIENT
Start: 2025-03-12

## 2025-03-12 RX ORDER — ZOLPIDEM TARTRATE 5 MG/1
5 TABLET ORAL
Qty: 30 TABLET | Refills: 1 | Status: SHIPPED | OUTPATIENT
Start: 2025-03-12

## 2025-03-12 RX ORDER — ESTRADIOL 10 UG/1
TABLET, FILM COATED VAGINAL
Qty: 24 TABLET | Refills: 3 | Status: SHIPPED | OUTPATIENT
Start: 2025-03-12

## 2025-03-12 RX ORDER — PROCHLORPERAZINE MALEATE 10 MG
10 TABLET ORAL EVERY 6 HOURS PRN
Qty: 20 TABLET | Refills: 0 | Status: CANCELLED | OUTPATIENT
Start: 2025-03-12

## 2025-03-12 RX ORDER — DULOXETIN HYDROCHLORIDE 30 MG/1
30 CAPSULE, DELAYED RELEASE ORAL DAILY
Qty: 30 CAPSULE | Refills: 0 | Status: SHIPPED | OUTPATIENT
Start: 2025-03-12

## 2025-03-12 RX ORDER — DRONABINOL 5 MG/ML
5 SOLUTION ORAL
Qty: 60 ML | Refills: 0 | Status: SHIPPED | OUTPATIENT
Start: 2025-03-12

## 2025-03-12 RX ORDER — METHOCARBAMOL 750 MG/1
750 TABLET, FILM COATED ORAL 4 TIMES DAILY PRN
Qty: 120 TABLET | Refills: 11 | Status: SHIPPED | OUTPATIENT
Start: 2025-03-12

## 2025-03-12 RX ORDER — LEVOTHYROXINE SODIUM 175 UG/1
175 TABLET ORAL DAILY
Qty: 90 TABLET | Refills: 3 | Status: SHIPPED | OUTPATIENT
Start: 2025-03-12

## 2025-03-12 NOTE — PROGRESS NOTES
Assessment & Plan     Hypothyroidism, unspecified type    Check TSH today, adjust medication if needed.    - levothyroxine (SYNTHROID/LEVOTHROID) 175 MCG tablet; Take 1 tablet (175 mcg) by mouth daily.    Cramp of limb  and  Chronic pain syndrome    Dinora has been waking with abdominal and limb cramps overnight despite trying to stay well hydrated and doing stretches, so we'll check some electrolytes and TSH as above.  Continue prn Robaxin.  Consider trial of vitamin K2 pending results.     - Basic metabolic panel  (Ca, Cl, CO2, Creat, Gluc, K, Na, BUN); Future  - Magnesium; Future  - methocarbamol (ROBAXIN) 750 MG tablet; Take 1 tablet (750 mg) by mouth 4 times daily as needed for muscle spasms.    Adult failure to thrive  and  Malnutrition, unspecified type  and  History of food intolerance  And  Nausea and vomiting, unspecified vomiting type    Refills provided    - droNABinol (SYNDROS) 5 MG/ML oral soln; Take 1 mL (5 mg) by mouth 2 times daily (before meals).  - prochlorperazine (COMPAZINE) 10 MG tablet; Take 1 tablet (10 mg) by mouth every 6 hours as needed for nausea or vomiting.    Situational depression  and  Anxiety    Dinora's mood has been improved and she'd like to try getting off the duloxetine.  We'll reduce from 60mg to 30mg daily for 1 month and stop if that goes well    - DULoxetine (CYMBALTA) 30 MG capsule; Take 1 capsule (30 mg) by mouth daily. For 1 month and then stop if doing well    Atrophic vaginitis    Continue estradiol and try Replens vaginal moisturizer    - estradiol (VAGIFEM) 10 MCG TABS vaginal tablet; INSERT 1 TABLET(10 MCG) VAGINALLY 2 TIMES A WEEK    Primary insomnia    Dinora has had trouble sleeping for the past 5 years and has not had benefit from melatonin or trazodone.  Recommended CBT-I, and she was interested so referral placed.  Can do a trial of   Ambien in the meantime but hopefully this will be a short term prescription.  We'll follow-up in 2 months.     - Adult Mental  [facility] Referral; Future  - zolpidem (AMBIEN) 5 MG tablet; Take 1 tablet (5 mg) by mouth nightly as needed for sleep.      Counseling  Appropriate preventive services were addressed with this patient via screening, questionnaire, or discussion as appropriate for fall prevention, nutrition, physical activity, Tobacco-use cessation, social engagement, weight loss and cognition.  Checklist reviewing preventive services available has been given to the patient.  Reviewed patient's diet, addressing concerns and/or questions.   The patient was instructed to see the dentist every 6 months.   She is at risk for psychosocial distress and has been provided with information to reduce risk.         Subjective   Dinora is a 59 year old, presenting for the following health issues:  Follow Up, Refill Request (Flexeril (renewal), Syndros, Robaxin, Compazine), and Recheck Medication (Discuss switching sleep medication. Discontinuing duloxetine. Pt has finished blood thinner.)        3/12/2025    11:19 AM   Additional Questions   Roomed by brenna dow     HPI      Since February, at night she is having significant abdominal cramps as well as in the feet and legs)- she has tried Flexeril and Robaxin with some benefit and they were about the same in efficacy.  She was wondering about dehydration but drinks a lot and is getting fluids.  She is doing some stretching before bed and doing some basic yoga moves.     She is using Syndros about 2 times per week and Compazine a couple of times per day for the past week due to a flare of gastroparesis, but hadn't used it for a month before that.    Discuss switching sleep medication- She started having sleep issues about 5 years ago.  She has tried melatonin and trazodone but these have not been helpful.      Discuss continuing duloxetine: Dinora is on duloxetine for anxiety and depression and her mood is overall improved and feels like the med is blunting her mood.  Following with therapy.  "            Objective    /72 (BP Location: Right arm, Patient Position: Sitting, Cuff Size: Adult Regular)   Pulse 88   Temp 98.1  F (36.7  C) (Oral)   Resp 16   Ht 1.727 m (5' 7.99\")   Wt 67.9 kg (149 lb 12.8 oz)   SpO2 100%   BMI 22.78 kg/m    Body mass index is 22.78 kg/m .  Physical Exam               Signed Electronically by: Juan Jose Gomez MD    "

## 2025-03-13 ENCOUNTER — HOME INFUSION BILLING (OUTPATIENT)
Dept: HOME HEALTH SERVICES | Facility: HOME HEALTH | Age: 60
End: 2025-03-13
Payer: COMMERCIAL

## 2025-03-13 ENCOUNTER — HOME INFUSION (OUTPATIENT)
Dept: HOME HEALTH SERVICES | Facility: HOME HEALTH | Age: 60
End: 2025-03-13
Payer: COMMERCIAL

## 2025-03-13 PROCEDURE — A4215 STERILE NEEDLE: HCPCS

## 2025-03-13 PROCEDURE — S9379 HIT NOC PER DIEM: HCPCS

## 2025-03-14 LAB — ZINC SERPL-MCNC: 86.8 UG/DL

## 2025-03-14 PROCEDURE — S9379 HIT NOC PER DIEM: HCPCS

## 2025-03-15 PROCEDURE — S9379 HIT NOC PER DIEM: HCPCS

## 2025-03-16 PROCEDURE — S9379 HIT NOC PER DIEM: HCPCS

## 2025-03-17 PROCEDURE — S9379 HIT NOC PER DIEM: HCPCS

## 2025-03-18 ENCOUNTER — VIRTUAL VISIT (OUTPATIENT)
Dept: BEHAVIORAL HEALTH | Facility: CLINIC | Age: 60
End: 2025-03-18
Attending: INTERNAL MEDICINE
Payer: COMMERCIAL

## 2025-03-18 DIAGNOSIS — F43.23 ADJUSTMENT DISORDER WITH MIXED ANXIETY AND DEPRESSED MOOD: Primary | ICD-10-CM

## 2025-03-18 DIAGNOSIS — F51.01 PRIMARY INSOMNIA: ICD-10-CM

## 2025-03-18 PROCEDURE — 90832 PSYTX W PT 30 MINUTES: CPT | Mod: 95

## 2025-03-18 PROCEDURE — S9379 HIT NOC PER DIEM: HCPCS

## 2025-03-18 ASSESSMENT — PATIENT HEALTH QUESTIONNAIRE - PHQ9
SUM OF ALL RESPONSES TO PHQ QUESTIONS 1-9: 5
SUM OF ALL RESPONSES TO PHQ QUESTIONS 1-9: 5
10. IF YOU CHECKED OFF ANY PROBLEMS, HOW DIFFICULT HAVE THESE PROBLEMS MADE IT FOR YOU TO DO YOUR WORK, TAKE CARE OF THINGS AT HOME, OR GET ALONG WITH OTHER PEOPLE: SOMEWHAT DIFFICULT

## 2025-03-18 NOTE — PROGRESS NOTES
"     {Site:105987}    Integrated Behavioral Health   Mental Health & Addiction Services      Progress Note - Initial [facility] Visit     Patient Name: Dinora Mcghee    Date: March 18, 2025  Service Type: {[facility] SERVICE TYPE:220467}   Visit Start Time: {Video Visit Start Time:177682}  Visit End Time:  {Video Visit Start Time:177682}   Attendees: {Attendee(s):880667}   Service Modality: {Service Modality:950667}     [facility] Visit Activities (Refresh list every visit): {REFRESH & RESELECT EVERY ENCOUNTER:017910}       8 organs missing and type 1 diabetic, ostomy,   Three days swimming and at least one day 1.5 years   DATA:     Interactive Complexity: {82041 add on - Interactive Complexity:524950}   Crisis: {23602 (60 min) / 06179 (30 min add-on; stackable if needed):206797}     Assessments completed prior to this visit:     {[facility] ASSESSMENTS:393972::\"The following assessments were completed by patient for this visit:\"}     Referral:   Patient was referred to [facility] by {[facility] REFFERED BY:679117}.    Reason for referral: {[facility] REFERRAL REASONS:120512}.      [facility] introduced self and role. Discussed informed consent and limits to confidentiality.     Presenting Concerns/ Current Stressors:   ***     Therapeutic Interventions:  {[facility] Interventions:107711}    Response to treatment interventions:   {Patient response to intervention:065878}    Safety Issues and Plan for Safety and Risk Management:     Patient {Risk History:545512}   Patient {PERSONAL SAFETY:888179}   Patient {SUICIDAL IDEATION:145248}   Patient {HOMICIDAL IDEATION:825269}   Patient {SELF INJURIOUS:964767}   Patient {OTHER SAFETY CONCERNS:463745}   {SAFETY PLAN:523059}   Patient reports there are {Firearms:870170}.       ASSESSMENT:   Mental Status:     Appearance:   {Appearance:899744}   Eye Contact:   {Eye Contact:145716}   Psychomotor Behavior: {Psychomotor Behavior:465451}   Attitude:   {Attitude:306704}   Orientation:   {Orientation:203898}   Speech Rate / " Production: {Speech Rate/Production:702035}   Volume:   {Speech Volume:654765}   Mood:    {Mood:574288}   Affect:    {Affect:647336}   Thought Content:  {Thought Content:754177}   Thought Form:  {Thought Form:219041}   Insight:    {Insight:351911}        Diagnostic Criteria:   {DSM5 Classifications and Criteria:967566}        DSM5 Diagnoses: (Sustained by DSM5 Criteria Listed Above)     Diagnoses: {DSM5 MH Diagnosis:013276}     Psychosocial / Contextual Factors: {Psychosocial/Contextual Factors:032854}       Collateral Reports Completed:   {[facility] COLLATERAL:921967}        PLAN: (Homework, other):     1. Patient was provided:  {Patient was provided:239855}     2. Provider recommended the following referrals: ***.        3. Suicide Risk and Safety Concerns were assessed for Dinora Mcghee    Safety Plan:   {Safety Plan:014042}       {[facility]:111005}   March 18, 2025    provider.    Reason for referral: determine behavioral health treatment options and assess client readiness and motivation to change.      [facility] introduced self and role. Discussed informed consent and limits to confidentiality.     Presenting Concerns/ Current Stressors:   Pt discussed recent changes in her life and the complicated physical health struggles she is experiencing.  Provided education on CBT-I and also encouraged her to talk to her providers about some difficulties that might not be fixable via behavioral health.       Therapeutic Interventions:  Motivational Interviewing (MI): Validated patient's thoughts, feelings and experience. Expressed respect for patient's autonomy in decision making.  Asked open-ended questions to invite patient's self-reflection and self-direction around change and what is important for them in working towards their goals.  Expressed and demonstrated empathy through reflective listening.  Affirmed patient's strengths and abilities.  Cognitive Behavioral Therapy (CBT): Provided psycho-education on cognitive distortions. and Identified and challenged maladaptive patterns of behavior and thinking that interfere with patients life  Psycho-education: Provided psycho-education about patient's behavioral health condition and symptoms.    Response to treatment interventions:   Patient was receptive to interventions utilized.     Safety Issues and Plan for Safety and Risk Management:     Patient denies a history of suicidal ideation, suicide attempts, self-injurious behavior, homicidal ideation, homicidal behavior, and and other safety concerns   Patient denies current fears or concerns for personal safety.   Patient denies current or recent suicidal ideation or behaviors.   Patient denies current or recent homicidal ideation or behaviors.   Patient denies current or recent self injurious behavior or ideation.   Patient denies other safety concerns.   Recommended that patient call 911 or  go to the local ED should there be a change in any of these risk factors   Patient reports there are no firearms in the house.       ASSESSMENT:   Mental Status:     Appearance:   Appropriate    Eye Contact:   Good    Psychomotor Behavior: Normal    Attitude:   Cooperative    Orientation:   All   Speech Rate / Production: Normal/ Responsive   Volume:   Normal    Mood:    Normal   Affect:    Appropriate    Thought Content:  Clear    Thought Form:  Coherent    Insight:    Good         Diagnostic Criteria:   Adjustment Disorder  A. The development of emotional or behavioral symptoms in response to an identifiable stressor(s) occurring within 3 months of the onset of the stressor(s)  B. These symptoms or behaviors are clinically significant, as evidenced by one or both of the following:       - Marked distress that is out of proportion to the severity/intensity of the stressor (with consideration for external context & culture)       - Significant impairment in social, occupational, or other important areas of functioning  C. The stress-related disturbance does not meet criteria for another disorder & is not not an exacerbation of another mental disorder  D. The symptoms do not represent normal bereavement  E. Once the stressor or its consequences have terminated, the symptoms do not persist for more than an additional 6 months       * Adjustment Disorder with Mixed Anxiety and Depressed Mood: The predominant manifestation is a combination of depression and anxiety        DSM5 Diagnoses: (Sustained by DSM5 Criteria Listed Above)     Diagnoses: Adjustment Disorders  309.28 (F43.23) With mixed anxiety and depressed mood     Psychosocial / Contextual Factors: Medical Complexities       Collateral Reports Completed:   Not Applicable        PLAN: (Homework, other):     1. Patient was provided:  recommendation to schedule follow-up with [facility]     2. Provider recommended the following referrals: follow up with medical providers.         3. Suicide Risk and Safety Concerns were assessed for Dinora Mcghee    Safety Plan:   Patient denied any current/recent/lifetime history of suicidal ideation and/or behaviors. Recommended that patient call 911 or go to the local ED should there be a change in any of these risk factors       Krissy Diaz, [facility] [place] [facility]   March 18, 2025   Answers submitted by the patient for this visit:  Patient Health Questionnaire (Submitted on 3/18/2025)  If you checked off any problems, how difficult have these problems made it for you to do your work, take care of things at home, or get along with other people?: Somewhat difficult  PHQ9 TOTAL SCORE: 5

## 2025-03-19 PROCEDURE — S9379 HIT NOC PER DIEM: HCPCS

## 2025-03-20 ENCOUNTER — HOME INFUSION (OUTPATIENT)
Dept: HOME HEALTH SERVICES | Facility: HOME HEALTH | Age: 60
End: 2025-03-20
Payer: COMMERCIAL

## 2025-03-20 ENCOUNTER — HOME INFUSION BILLING (OUTPATIENT)
Dept: HOME HEALTH SERVICES | Facility: HOME HEALTH | Age: 60
End: 2025-03-20
Payer: COMMERCIAL

## 2025-03-20 PROCEDURE — A4215 STERILE NEEDLE: HCPCS

## 2025-03-20 PROCEDURE — A4208 3 CC STERILE SYRINGE&NEEDLE: HCPCS

## 2025-03-20 PROCEDURE — S9379 HIT NOC PER DIEM: HCPCS

## 2025-03-21 PROCEDURE — S9379 HIT NOC PER DIEM: HCPCS

## 2025-03-22 PROCEDURE — S9379 HIT NOC PER DIEM: HCPCS

## 2025-03-23 PROCEDURE — S9379 HIT NOC PER DIEM: HCPCS

## 2025-03-24 PROCEDURE — S9379 HIT NOC PER DIEM: HCPCS

## 2025-03-25 ENCOUNTER — OFFICE VISIT (OUTPATIENT)
Dept: EDUCATION SERVICES | Facility: CLINIC | Age: 60
End: 2025-03-25
Attending: PEDIATRICS
Payer: COMMERCIAL

## 2025-03-25 DIAGNOSIS — Z90.410 POST-PANCREATECTOMY DIABETES (H): ICD-10-CM

## 2025-03-25 DIAGNOSIS — E10.9 TYPE 1 DIABETES MELLITUS WITHOUT COMPLICATION (H): ICD-10-CM

## 2025-03-25 DIAGNOSIS — E89.1 POST-PANCREATECTOMY DIABETES (H): ICD-10-CM

## 2025-03-25 DIAGNOSIS — E13.9 POST-PANCREATECTOMY DIABETES (H): ICD-10-CM

## 2025-03-25 PROCEDURE — S9379 HIT NOC PER DIEM: HCPCS

## 2025-03-25 PROCEDURE — G0108 DIAB MANAGE TRN  PER INDIV: HCPCS | Performed by: DIETITIAN, REGISTERED

## 2025-03-25 RX ORDER — INSULIN ASPART 100 [IU]/ML
INJECTION, SOLUTION INTRAVENOUS; SUBCUTANEOUS
Qty: 60 ML | Refills: 4 | Status: SHIPPED | OUTPATIENT
Start: 2025-03-25

## 2025-03-25 RX ORDER — ACYCLOVIR 400 MG/1
TABLET ORAL
Qty: 9 EACH | Refills: 4 | Status: SHIPPED | OUTPATIENT
Start: 2025-03-25

## 2025-03-25 NOTE — PROGRESS NOTES
Diabetes Self-Management Education & Support:  Pre Pump Education    SUBJECTIVE/OBJECTIVE:  Dinora Mcghee presents today for education related to planning for an insulin pump related to Post Pancreatectomy Diabetes, tpiat in 2016    Patient is being treated with:  intensive insulin program  She is accompanied by self    Year of diagnosis: 2016  Referring provider:  Dr. Melissa    Has iphone  Using FreeStyle Yandy 3 Plus     CURRENT INSULIN REGIMEN:  Long Acting Insulin: Insulin Glargine U-100  (Lantus, Semglee, Basaglar) 6 units in the morning  Rapid Acting Insulin: Humalog 4-20 units, daily  2-3 units with meals   Total Daily Dose: 10 - 30 units    MONITORING:  Patient glucose self monitoring as follows: continuously using a continuous glucose monitor (CGM)  FreeStyle Yandy 3 Plus       RAPID-ACTING INSULIN CALCULATIONS:  Does patient currently count carbs? yes, counts in grams  Is instruction indicated? No, uses calorie rowan  How is insulin determined for correction: uses correction factor or follows sliding scale 1 unit to drop 20 points    PROBLEM SOLVING:    Frequency and treatment of hypoglycemia: 1-2 times per week, will give mini doses of glucagon 1-1.5 units, treats lows with juice    What is considered a high blood sugar? 160-170s    How is high BG treated? Correctional insulin  Does Patient test for ketones? No  At what level of blood glucose are ketones tested? Reviewed hyperglycemia protocol for pump users      Physical Activity:    Eats 15 grams of CHO before exercising, adds protein, protein shakes  What accomodations are made for exercising?     Diabetes knowledge and skills assessment:   Patient is knowledgeable in diabetes management concepts related to: Healthy Eating, Being Active, Monitoring, Taking Medication, Problem Solving, Reducing Risks, and Healthy Coping    Patient needs further education on the following diabetes management concepts: Insulin Pump Concepts -- Pump hardware - tubed vs  pod/patch, infusion sets, cannulas, reservoirs & cartridges   -- Pump software - automated insulin delivery systems, features of different AID algorithms   -- Balancing glucose and insulin - carbohydrate counting, bolus calculator, ICR, ISF, timing of insulin delivery, troubleshooting missed boluses   -- Problem solving with insulin pump therapy - risk of pump failure, MDI back up plan, risk of DKA, keeping adequate supplies on hand, risk of hypoglycemia, exiting and re-entering automated delivery modes   -- Hands on practice with insulin pump simulator apps      Based on learning assessment above, most appropriate setting for further diabetes education would be: Individual setting.    EDUCATION PROVIDED TODAY  Explained the way an insulin pump works, in terms that described the function of a basal rate and a bolus calculator.    Demonstrated the operation of the main pumps on the market right now: Omnipod 5, Tandem t:slim X2 and Mobi with Control-IQ, Beta Bionics iLet, and Medtronic MiniMed 780G.    Explained the unique features of each pump.      Explained that having a pump does not take the place of checking his blood glucoses, counting carbs, or remembering to push the button to deliver the bolus.   Described how the insulin delivers insulin through a cannula inserted under the skin and attached to the pump itself, or in the case of Omnipod, controlled wirelessly via blue tooth pairing with the Personal Diabetes Manager (PDM) / Controller.     Reviewed some of the increased risks associated with using a pump, i.e. DKA, especially if not checking BG at least 3-4 times daily or using a continuous glucose monitor (CGM).    Explained in general terms the costs associated with using an insulin pump, including the disposables and insulin.  Explained the process for obtaining a pump:  Approval by diabetes management team, application to the pump company, approval by insurance company, and necessary pre-training, and  post-pump follow-up.    Explained the need to follow up on a regular basis with diabetes care team in order to continue to have insulin pumping supplies approved by insurance.     ASSESSMENT/PLAN:  Post pancreatectomy patient seen for Comprehensive Knowledge Assessment and Instruction and pre-pump education at the request of Dr. Melissa. Taking 6 units of Lantus and 4-20 units of Humalog daily. Using Humalog yousif Kwikpens for half unit dosing option. Discussed insulin pumps. Has iPhone and using FreeStyle Yandy 3 Plus sensor. Will need to switch to Dexcom G7. Discussed Dexcom G7 - will be able to use with iPhone and Apple watch for readings. Does not wish to carry around Omnipod 5 controller. Would like to move forward with Tandem Mobi pump. Has frequent lows and will give mini-doses of glucagon, 1-2 units. Will eat 15g CHO prior to exercising.Instructed Dinora to call tandem to start pump ordering process.    Contact Tandem to start Mobi order  Ordered Dexcom G7 and Novolog vials to [facility]   Switch to wearing Dexcom G7 sensor - can pair with Apple watch    Follow-up:   Pump start appointment to be scheduled with [employer] .  Will need to schedule pump follow-up 2 weeks after pump start.  Has follow-up with Dr. melissa    Questionnaire answers:  Diabetes education in the past 24mo: (Patient-Rptd) Yes  Diabetes type: (Patient-Rptd) Other (see Comments)  Please elaborate:: (Patient-Rptd) TP/iat at [facility] 2016  Disease course: (Patient-Rptd) Getting harder to manage  How confident are you filling out medical forms by yourself:: (Patient-Rptd) Extremely  Diabetes management related comments/concerns: (Patient-Rptd) Lows  Cultural Influences/Ethnic Background:  Not  or       Diabetes Symptoms & Complications:  Diabetes Related Symptoms: (Patient-Rptd) Fatigue, Neuropathy, Polydipsia (increased thirst), Polyphagia (increased hunger), Visual change  Weight trend: (Patient-Rptd)  Increasing  Symptom course: (Patient-Rptd) Stable  Disease course: (Patient-Rptd) Getting harder to manage       Monitoring:  Blood Glucose Meter: (Patient-Rptd) FreeStyle  Times checking blood sugar at home (number): (Patient-Rptd) Other (see Comments)  Please elaborate:: (Patient-Rptd) Yandy 3 CGM    Taking Medications:  Current Treatments: (Patient-Rptd) Diet, Insulin Injections, Non-insulin Injectables    Healthy Eating:  Cultural/Christian diet restrictions?: (Patient-Rptd) No  How many times a week on average do you eat food made away from home (restaurant/take-out)?: (Patient-Rptd) 1  Meals include: (Patient-Rptd) Breakfast, Lunch, Dinner, Morning Snack, Afternoon Snack, Evening Snack  Beverages: (Patient-Rptd) Water, Tea, Coffee, Juice, Coffee drinks    Being Active:  Barrier to exercise: (Patient-Rptd) Physical limitation      Reducing Risks:  Has dilated eye exam at least once a year?: (Patient-Rptd) No  Sees dentist every 6 months?: (Patient-Rptd) No  Feet checked by healthcare provider in the last year?: (Patient-Rptd) Yes    Healthy Coping:  Informal Support system:: (Patient-Rptd) Children, Family, Friends, Neighbors, Parent, Spouse        Time Spent: 35 minutes  Encounter Type: Individual     Any diabetes medication dose changes were made via the CDE Protocol Collaborative Practice Agreement with referring provider.  A copy of this encounter was provided to patient's referring provider.

## 2025-03-25 NOTE — PATIENT INSTRUCTIONS
Ap Farias,    It was nice meeting with you. Here is a summary of our visit and plan:    Call [employer] [phone] to start pump ordering process for [employer].    Start wearing Dexcom G7, can pair Dexcom G7 with Apple Watch    For videos on how to use the sensor, , and the beatris: [email or web address]     Dexcom Care - Training offered by [employer]  Monday - Friday 9AM - 7PM [facility]  [phone]     Product Support 24/7 - call for replacement sensors   [phone]    DEXCOM G7 Instructions:    The Dexcom G7 is worn on the back of the arm and has a 30-minute warm up period where you will note be able to get blood sugar data. If you need data utilize your glucometer to manually check.     Check blood sugar if feeling symptoms of hypoglycemia but meter is not displaying a low number- may be some variability between sensor and meter at times.     Change Dexcom G7 every 10 days - it has a 12 hr alyson period for changing the sensor.     Watch trend arrows- will let you know if blood sugars are rising, falling or stable at the time you check.     Dexcom G7 has alarms. You can adjust both the high and low blood glucose alarm (when they will go off) or turn off high blood glucose alarm if alarming too much.   You cannot turn off the critical low blood glucose alarm (55 or below)    You can shower, bathe, and swim with sensor on.      Remove the sensor if you need to have an MRI or CT scan.     If you need extra adhesive, you can ask Dexcom support for them free of charge.  If you have trouble with sensor falling off, these are approved products to help with sensor adhesion: Torbot Skin Tac, SKIN-PREP Protective Barrier Wipe and Mastisol Liquid Adhesive     You can use the application on your phone to start the sensor or the . If using the application, Clarity is built in for you to view more glucose data  It would be great to get you connected to [facility] so you can  share your blood glucose data with your provider.   Clinic code: [id number]        Support:   If you have any trouble with use or if the sensor falls off early, call the customer service number for Dexcom located on the sensor's box. They can often help troubleshoot or replace sensor/transmitter if needed.           Contact information:   If you have concerns, please send a [employer] message or call the clinic at [phone].    For more urgent concerns that do not require 076, please call [phone] after hours/weekends and ask to speak with the Endocrinologist on call.    To schedule a Diabetes Education appointment, call [phone]    Please let us know if you are having low blood sugars less than 70 or over 300 mg/dL.  Do not wait until your next appointment if this is happening.

## 2025-03-26 PROCEDURE — S9379 HIT NOC PER DIEM: HCPCS

## 2025-03-27 ENCOUNTER — HOME INFUSION (OUTPATIENT)
Dept: HOME HEALTH SERVICES | Facility: HOME HEALTH | Age: 60
End: 2025-03-27
Payer: COMMERCIAL

## 2025-03-27 PROCEDURE — S9379 HIT NOC PER DIEM: HCPCS

## 2025-03-28 ENCOUNTER — HOME INFUSION BILLING (OUTPATIENT)
Dept: HOME HEALTH SERVICES | Facility: HOME HEALTH | Age: 60
End: 2025-03-28
Payer: COMMERCIAL

## 2025-03-28 PROCEDURE — A4208 3 CC STERILE SYRINGE&NEEDLE: HCPCS

## 2025-03-28 PROCEDURE — A4215 STERILE NEEDLE: HCPCS

## 2025-03-28 PROCEDURE — S9379 HIT NOC PER DIEM: HCPCS

## 2025-03-29 PROCEDURE — S9379 HIT NOC PER DIEM: HCPCS

## 2025-03-30 PROCEDURE — S9379 HIT NOC PER DIEM: HCPCS

## 2025-03-31 PROCEDURE — S9379 HIT NOC PER DIEM: HCPCS

## 2025-04-01 PROCEDURE — S9379 HIT NOC PER DIEM: HCPCS

## 2025-04-03 ENCOUNTER — HOME INFUSION BILLING (OUTPATIENT)
Dept: HOME HEALTH SERVICES | Facility: HOME HEALTH | Age: 60
End: 2025-04-03
Payer: COMMERCIAL

## 2025-04-03 ENCOUNTER — HOME INFUSION (OUTPATIENT)
Dept: HOME HEALTH SERVICES | Facility: HOME HEALTH | Age: 60
End: 2025-04-03
Payer: COMMERCIAL

## 2025-04-03 ENCOUNTER — LAB (OUTPATIENT)
Dept: LAB | Facility: CLINIC | Age: 60
End: 2025-04-03
Payer: COMMERCIAL

## 2025-04-03 ENCOUNTER — OFFICE VISIT (OUTPATIENT)
Dept: TRANSPLANT | Facility: CLINIC | Age: 60
End: 2025-04-03
Attending: PEDIATRICS
Payer: COMMERCIAL

## 2025-04-03 VITALS
HEART RATE: 96 BPM | OXYGEN SATURATION: 98 % | SYSTOLIC BLOOD PRESSURE: 112 MMHG | BODY MASS INDEX: 24.09 KG/M2 | DIASTOLIC BLOOD PRESSURE: 72 MMHG | RESPIRATION RATE: 16 BRPM | TEMPERATURE: 98 F | WEIGHT: 158.4 LBS

## 2025-04-03 DIAGNOSIS — E16.2 HYPOGLYCEMIA: ICD-10-CM

## 2025-04-03 DIAGNOSIS — K90.89 OTHER SPECIFIED INTESTINAL MALABSORPTION: ICD-10-CM

## 2025-04-03 DIAGNOSIS — K86.81 EXOCRINE PANCREATIC INSUFFICIENCY: ICD-10-CM

## 2025-04-03 DIAGNOSIS — K86.81 EXOCRINE PANCREATIC INSUFFICIENCY: Primary | ICD-10-CM

## 2025-04-03 DIAGNOSIS — E10.9 TYPE 1 DIABETES MELLITUS WITHOUT COMPLICATION (H): ICD-10-CM

## 2025-04-03 LAB
EST. AVERAGE GLUCOSE BLD GHB EST-MCNC: 120 MG/DL
FERRITIN SERPL-MCNC: 21 NG/ML (ref 11–328)
HBA1C MFR BLD: 5.8 %
HGB BLD-MCNC: 10 G/DL (ref 11.7–15.7)
IRON BINDING CAPACITY (ROCHE): 393 UG/DL (ref 240–430)
IRON SATN MFR SERPL: 5 % (ref 15–46)
IRON SERPL-MCNC: 21 UG/DL (ref 37–145)

## 2025-04-03 PROCEDURE — 99213 OFFICE O/P EST LOW 20 MIN: CPT | Performed by: PEDIATRICS

## 2025-04-03 PROCEDURE — 83036 HEMOGLOBIN GLYCOSYLATED A1C: CPT | Performed by: PEDIATRICS

## 2025-04-03 PROCEDURE — 82306 VITAMIN D 25 HYDROXY: CPT | Performed by: PEDIATRICS

## 2025-04-03 NOTE — PROGRESS NOTES
[facility]  Islet Autotransplant, Diabetes Follow Up    Problem List:  Patient Active Problem List   Diagnosis    Hypothyroidism    Need for prophylactic immunotherapy    Sprain and strain of other specified sites of hip and thigh    Chondromalacia of patella    Sensorineural hearing loss    Vertiginous syndrome and labyrinthine disorder    Lumbago    Allergic rhinitis due to other allergen    GERD (gastroesophageal reflux disease)    Other type of intractable migraine    History of ERCP    Abdominal pain    S/P ERCP    Post-pancreatectomy diabetes (H)    Pancreatic insufficiency    Gastroparesis    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    Incisional hernia, without obstruction or gangrene    Adhesive capsulitis of shoulder, unspecified laterality    S/P hernia repair    Headache, chronic migraine without aura    Chronic pain syndrome    Iron deficiency anemia    Hypoglycemia    Adult failure to thrive    Abdominal pain, generalized    Gastrostomy tube obstruction (H)    Intra-abdominal abscess (H)    Postprocedural intraabdominal abscess (H)    Acquired absence of spleen    Depressive disorder    Diabetes insipidus    Other specified abnormal immunological findings in serum    Poisoning by drug    Sphincter of Oddi dysfunction    Type 1 diabetes mellitus without complication (H)    Myofascial pain    Feeding intolerance    Acute postoperative abdominal pain    Major depression, single episode    History of food intolerance    Malnutrition, unspecified type    Nausea and vomiting, unspecified vomiting type    On total parenteral nutrition (TPN)    Fever, unspecified fever cause    Chronic pancreatitis, unspecified pancreatitis type (H)    Cholangitis (H)    Abdominal pain, unspecified abdominal location    Bacteremia    Constipation    Right upper quadrant abdominal pain    S/P pancreatic islet cell transplantation (H)    Dislodged jejunostomy tube           Assessment:  1.  Post  pancreatectomy diabetes mellitus, s/p total pancreatectomy and islet autotransplant.  (ICD-10 E89.1)  2.  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   Gastroparesis - much improved following colectomy but high ostomy output  4.  Nausea, vomiting      Dinora is a 60 year old  with history of chronic pancreatitis who is s/p total pancreatectomy and islet autotransplant.  She was insulin independent after surgery, then on and off insulin with A1c in goal range in the past, but more recently has been consistently needing low dose insulin.  Major interval changes complicating diabetes management include: recent colectomy (Oct 2024). She was previously on TPN use, enteral feeds and is now only on oral feeds.    Pertinent issues today include:    # Fatigue - checking vitamin levels and we did get results consistent with significant iron deficiency, others pending.  Recommend IV iron. Our team will follow up with full recommendations as all labs return.  # Diabetes- A1c and CGM at goal but major issue is very insulin sensitive and has lability.  We are changing over to a hybrid closed loop pump (MOBI) which I think will be ideal for her.       Plan:  1.  Changes to current diabetes regimen:  Patient Instructions   1)  Diabetes plan- continue what you are doing until we have the Mobi insulin pump. Would plan to start with basal 0.2 unit/hour, bolus I:C ratio 20g, ISF ~100 mg/dL.    2)  Refill glucagon (mini glucagon dosing for now for lows, likely will be less often needed on automated insulin pump though).    3)  Vitamin and iron levels.  - Initial results show low iron so we will follow up with plan.         2.  Frequency of blood sugar checks:  Keep wearing Yandy-- this is much better for Dinora than fingersticks because with fingersticks we miss the really critical data of the post prandial  excursions.    3.  Continue routine follow up for autoislet transplant patients:  Mixed meal test (6 mL/kg BoostHP to max of 360 mL) at 3 months, 6 months, and once a year post transplant.  Hemoglobin A1c levels at these time points and quarterly.    4.  Other issues addressed today:  none    Follow up:  4 mos    Contact me for questions at [phone] or [phone].  Emergency number to reach pediatric endocrinology after hours is [phone].    Dr. Gloria Melissa MD  Professor, Pediatric Endocrinology  [facility]  Phone:  [phone]  Electronically signed: April 3, 2025 at 6:11 PM    40 minutes spent by me on the date of the encounter doing chart review, history and exam, documentation and further activities per the note         The longitudinal plan of care for the diagnosis(es)/condition(s) as documented were addressed during this visit. Due to the added complexity in care, I will continue to support Dinora in the subsequent management and with ongoing continuity of care.        HPI:  Dinora is a 60 year old  female here for follow up oflaparoscopic assisted total pancreatectomy, islet cell autotransplant, splenectomy, gastrojejunostomy tube revision, choledochoduodenostomy, duodenojejunostomy, Vera-Y reconstruction performed on 2016.  At the time of the procedure, the patient received:  Total Islet number: [id number].  Total Islet number/k.  Islet equivalents: [id number]  Islet equivalents/kilogram: 4091    Post-surgical course was complicated by two minor procedures for a retained foreign body near GJ site in 2017 and hernia repair 2018, and severe gastroparesis (refractory to J-feeds, domperidone).  She was initially insulin independent at about 1 year after surgery.  Since then has been on/off low dose insulin and now more consistently /continually on insulin.   - Gastroparesis and GI dysmotility is her major issue post TPIAT. Admitted multiple  times. GJ was helpful for short time but did not tolerate well.  Has tried multiple other approaches and continues to worsen, nothing offering benefit.  Tried expanded access domperidone but not helpful.  Finally responded to  total colectomy and ostomy for severe slow transit constipation 11/2024  - Also has hypoglycemia (prior treatments SGLT2 inhibitor not helpful, acarbose is helpful when eating, also uses mini glucagon).     At today's visit,   --  Major complaint is fatigue. She is up multiple times per night for ostomy high output and also for diabetes as she has hypoglycemia intermittently.  This is probably contributing.  However noted that since she was for a long time managed on TPN and followed with infusion labs, we have not for some time done the routine TPIAT labs here, so we have vitamin studies and iron ordered.  -- she did start MVW last visit (see last note)  -- Insulin dosing as below.  She has lability and has used mini dose glucagon multiple times this week and needs a refill.   -- Intestinal cramping at night with ostomy.  Also gets pills and food stuck in esophagus.  BUT has robustly gained back weight, BMI 24 and she looks healthy and is eating well.   -- Met with diabetes ed and we have ordered Mobi pump.  She is a great candidate for this.  Will also change to Dexcom G7 to work with pump.       Diabetes history:  Current insulin regimen:  Glargine, 6 units daily.  Humalog, getting yousif pen -- 0.5 unit per 10 grams with meals.   Covers post meal b/c of ongoing uncertainty of regarding how much her stomach can handle.       Wearing Yandy- pattern = post prandial hyperglycemia          Recent hemoglobin A1c levels:      Lab Results   Component Value Date    A1C 5.8 04/03/2025    A1C 5.4 07/23/2024    A1C 5.6 03/20/2024    A1C 5.4 02/17/2022    A1C 5.2 11/27/2021    A1C 5.5 09/18/2021    A1C 5.4 08/05/2021    A1C 5.7 05/12/2021    A1C 5.9 04/01/2021    A1C 5.5 12/17/2020    A1C 5.8  07/30/2020         Patient is good candidate for CGM therapy.  Meets these criteria:  -- Has a diagnosis of diabetes,: This patient has type 1 diabetes secondary to pancreatectomy (surgical type 1)    --  Uses a home blood glucose monitor (BGM) and conduct four or more daily BGM tests, or is on CGM therapy:  CGM (jim)  --- Treated with insulin with multiple daily injections or a continuous subcutaneous insulin infusion (CSII) pump:  This patient is on MDI  --  Require frequent adjustments of the insulin treatment regimen, based on therapeutic CGM test results:  YES  -- The patient agrees to wear CGM and to share/upload data for our review and management.         Hypoglycemia history: mild but persistent/ bothersome. The patient has had 0 episodes of severe hypoglycemia (seizure, coma, or neuroglycopenic symptoms severe enough to require assistance from another person).  Blood sugars were reviewed from the patient records and/or the meter download.          Review of systems:  A complete ROS is negative except as noted in HPI above.      Past Medical and Surgical History:  Past Medical History:   Diagnosis Date    Allergic rhinitis, cause unspecified     Allergy to other foods     strawberries, apples, celeries, alice, watermelon    Arthritis     left knee    Choledocholithiasis     long after cholecystectomy    Chronic abdominal pain     Chronic constipation     Chronic nausea     Chronic pancreatitis (H)     Degeneration of lumbar or lumbosacral intervertebral disc     Depressive disorder     Situational. Would like to try weaning off Cymbalta    Esophageal reflux     w/ hiatal hernia    Gastroparesis     Hiatal hernia     History of pituitary adenoma     s/p resection    Hypothyroidism     Migraines     Mild hyperlipidemia     On tube feeding diet     presence of GJ tube    Pancreatic disease     PD stricture, 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     PONV (postoperative nausea and vomiting)     Portacath in  place     Type 1 diabetes mellitus without complication (H) 2022    Unspecified hearing loss     25% high frequency R     Past Surgical History:   Procedure Laterality Date    ABDOMEN SURGERY  1999    c sections: 93, 96, 98. endometriosis growth    APPENDECTOMY       SECTION  1993    CHOLECYSTECTOMY      COLONOSCOPY  2014    COLONOSCOPY N/A 2023    Procedure: COLONOSCOPY, WITH POLYPECTOMY AND BIOPSY;  Surgeon: Percy Chamorro MD;  Location: [facility]    ENDOSCOPIC INSERTION TUBE GASTROSTOMY N/A 2021    Procedure: Gastrojejonostomy placement with jejunopexy, PEG tube exchange;  Surgeon: Zackery Montoya MD;  [facility]    ENDOSCOPIC RETROGRADE CHOLANGIOPANCREATOGRAM N/A 2015    Procedure: ENDOSCOPIC RETROGRADE CHOLANGIOPANCREATOGRAM;  Surgeon: Mandeep Park MD;  [facility]    ENDOSCOPIC RETROGRADE CHOLANGIOPANCREATOGRAM N/A 2016    Procedure: COMBINED ENDOSCOPIC RETROGRADE CHOLANGIOPANCREATOGRAPHY, PLACE TUBE/STENT;  Surgeon: Mandeep Park MD;  [facility]    ENDOSCOPIC RETROGRADE CHOLANGIOPANCREATOGRAM N/A 2016    Procedure: COMBINED ENDOSCOPIC RETROGRADE CHOLANGIOPANCREATOGRAPHY, REMOVE FOREIGN BODY OR STENT/TUBE;  Surgeon: Mandeep Park MD;  [facility]    ENDOSCOPIC RETROGRADE CHOLANGIOPANCREATOGRAM N/A 2016    Procedure: COMBINED ENDOSCOPIC RETROGRADE CHOLANGIOPANCREATOGRAPHY, PLACE TUBE/STENT;  Surgeon: Mandeep Park MD;  [facility]    ENDOSCOPIC RETROGRADE CHOLANGIOPANCREATOGRAM N/A 2016    Procedure: COMBINED ENDOSCOPIC RETROGRADE CHOLANGIOPANCREATOGRAPHY, REMOVE FOREIGN BODY OR STENT/TUBE;  Surgeon: Mandeep Park MD;  [facility]    ENDOSCOPIC ULTRASOUND UPPER GASTROINTESTINAL TRACT (GI) N/A 10/03/2016    Procedure: ENDOSCOPIC ULTRASOUND, ESOPHAGOSCOPY / UPPER GASTROINTESTINAL TRACT (GI);  Surgeon: Guru Jose Rdoas MD;   [facility]    ENT SURGERY  1989    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    ENTEROSCOPY SMALL BOWEL N/A 02/03/2022    Procedure: ENTEROSCOPY with possible fistula closure;  Surgeon: Francisco Dodson MD;  Location:  GI    ENTEROSCOPY SMALL BOWEL N/A 09/11/2024    Procedure: Upper endoscopy, gastrostomy tube placement and jejunostomy tube exchange;  Surgeon: Zackery Montoya MD;  [facility]    ESOPHAGOSCOPY, GASTROSCOPY, DUODENOSCOPY (EGD), COMBINED N/A 06/24/2015    Procedure: COMBINED ESOPHAGOSCOPY, GASTROSCOPY, DUODENOSCOPY (EGD), REMOVE FOREIGN BODY;  Surgeon: Mandeep Park MD;  [facility]    ESOPHAGOSCOPY, GASTROSCOPY, DUODENOSCOPY (EGD), COMBINED N/A 10/25/2015    Procedure: COMBINED ESOPHAGOSCOPY, GASTROSCOPY, DUODENOSCOPY (EGD);  Surgeon: Sammy Amaro MD;  [facility]    ESOPHAGOSCOPY, GASTROSCOPY, DUODENOSCOPY (EGD), COMBINED N/A 10/25/2015    Procedure: COMBINED ESOPHAGOSCOPY, GASTROSCOPY, DUODENOSCOPY (EGD), BIOPSY SINGLE OR MULTIPLE;  Surgeon: Sammy Amaro MD;  [facility]    ESOPHAGOSCOPY, GASTROSCOPY, DUODENOSCOPY (EGD), COMBINED N/A 01/31/2023    Procedure: ESOPHAGOGASTRODUODENOSCOPY (EGD) with peg replacement ;  Surgeon: Mandeep Park MD;  [facility]    ESOPHAGOSCOPY, GASTROSCOPY, DUODENOSCOPY (EGD), COMBINED N/A 07/24/2024    Procedure: Esophagoscopy, gastroscopy, duodenoscopy (EGD), combined;  Surgeon: Zackery Montoya MD;  [facility]    ESOPHAGOSCOPY, GASTROSCOPY, DUODENOSCOPY (EGD), COMBINED N/A 10/31/2024    Procedure: Esophagoscopy, gastroscopy, duodenoscopy (EGD), combined;  Surgeon: Mandeep Park MD;  [facility]    ESOPHAGOSCOPY, GASTROSCOPY, DUODENOSCOPY (EGD), DILATATION, COMBINED      EXCISE LESION TRUNK N/A 04/17/2017    Procedure: EXCISE LESION TRUNK;  Removal of Abdominal Foreign Body;  Surgeon: Nestor Phoenix MD;  [facility]    GENITOURINARY SURGERY  1999    Hysterectomy    HC ESOPH/GAS  REFLUX TEST W NASAL IMPED >1 HR N/A 11/19/2015    Procedure: ESOPHAGEAL IMPEDENCE FUNCTION TEST WITH 24 HOUR PH GREATER THAN 1 HOUR;  Surgeon: Thiago Apple MD;  [facility]: [facility]    HC UGI ENDOSCOPY DIAG W BIOPSY  09/17/2008    HC UGI ENDOSCOPY DIAG W BIOPSY  09/27/2012    HC UGI ENDOSCOPY W ESOPHAGEAL DILATION BALLOON <30MM  09/17/2008    HC UGI ENDOSCOPY W EUS N/A 05/05/2015    Procedure: COMBINED ENDOSCOPIC ULTRASOUND, ESOPHAGOSCOPY, GASTROSCOPY, DUODENOSCOPY (EGD);  Surgeon: Wm Dueñas MD;  [facility]    HC WRIST ARTHROSCOP,RELEASE XVERS LIG Bilateral 12/17/2008    INJECT TRANSVERSUS ABDOMINIS PLANE (TAP) BLOCK BILATERAL Left 09/22/2016    Procedure: INJECT TRANSVERSUS ABDOMINIS PLANE (TAP) BLOCK BILATERAL;  Surgeon: Dickson Corrigan MD;  [facility]    INJECT TRIGGER POINT Bilateral 09/08/2022    Procedure: Abdominal trigger point injection with ultrasound;  Surgeon: Monika Mahajan MD;  [facility]    INJECT TRIGGER POINT SINGLE / MULTIPLE 3 OR MORE MUSCLES Right 11/15/2022    Procedure: Trigger point injections right abdomen with ultrasound guidance;  Surgeon: Monika Mahajan MD;  Location: [facility]    IR CHEST PORT PLACEMENT < 5 YRS OF AGE  06/10/2022    IR CHEST PORT PLACEMENT > 5 YRS OF AGE  01/29/2025    IR CVC TUNNEL PLACEMENT > 5 YRS OF AGE  04/15/2024    IR CVC TUNNEL REMOVAL RIGHT  01/29/2025    IR PORT REMOVAL RIGHT  11/09/2023    LAPAROSCOPIC ASSISTED COLECTOMY N/A 10/11/2024    Procedure: Exploratory laparotomy, extensive lysis of adhesions, revision of Jtube and small bowel resection, TOTAL ABDOMINAL COLECTOMY WITH ILEORECTAL ANASTAMOSIS, DIVERTING LOOP ILEOSTOMY, flexible sigmoidoscopy;  Surgeon: Kaylene Branch MD;  [facility]    laparoscopic pineda  01/01/1995    LAPAROSCOPIC HERNIORRHAPHY INCISIONAL N/A 08/23/2018    Procedure: LAPAROSCOPIC HERNIORRHAPHY INCISIONAL;  Laparoscopic Incisional Hernia Repair with Symbotex Mesh Implant;  Surgeon: Nestor Phoenix  MD Yanna;  [facility]    LAPAROSCOPIC PANCREATECTOMY, TRANSPLANT AUTO ISLET CELL N/A 12/28/2016    Procedure: LAPAROSCOPIC PANCREATECTOMY, TRANSPLANT AUTO ISLET CELL;  Surgeon: Nestor Phoenix MD;  [facility]    LAPAROTOMY EXPLORATORY N/A 01/31/2023    Procedure: Exploratory Laparotomy, lysis of adhesions, Perforated J-Junostomy Resection, Replace J-Junostomy site;  Surgeon: Elver Bauer MD;  [facility]    LAPAROTOMY, LYSIS ADHESIONS, COMBINED N/A 01/31/2023    Procedure: Dilatation of jejunostomy feeding tube, track with placement of jejunostomy tube with fluoroscopy;  Surgeon: Elver Bauer MD;  [facility]    PICC DOUBLE LUMEN PLACEMENT Left 11/13/2023    Basilic Vein 5F DL 43 cm    REMOVE AND REPLACE BREAST IMPLANT PROSTHESIS N/A 12/30/2022    Procedure: Exploratory Laparotomy, lysis of adhesions, small bowel resection. Placement of gastric jejunostomy for enteral feeding.;  Surgeon: Elver Bauer MD;  [facility]    REMOVE GASTROSTOMY TUBE ADULT N/A 01/28/2022    Procedure: REMOVAL, GASTROSTOMY TUBE, ADULT;  Surgeon: Mandeep Park MD;  [facility]    REMOVE GASTROSTOMY TUBE ADULT N/A 12/02/2024    Procedure: Remove gastrostomy tube adult- PEG;  Surgeon: Mandeep Park MD;  [facility]    REPLACE GASTROJEJUNOSTOMY TUBE, PERCUTANEOUS N/A 09/07/2021    Procedure: GASTROJEJUNOSTOMY TUBE PLACEMENT, PERCUTANEOUS, WITH GASTROPEXY;  Surgeon: Mandeep Park MD;  [facility]    REPLACE GASTROJEJUNOSTOMY TUBE, PERCUTANEOUS N/A 09/22/2021    Procedure: REPLACEMENT, GASTROJEJUNOSTOMY TUBE, PERCUTANEOUS;  Surgeon: Zackery Montoya MD;  [facility]    REPLACE GASTROJEJUNOSTOMY TUBE, PERCUTANEOUS N/A 11/22/2022    Procedure: REPLACEMENT, GASTROJEJUNOSTOMY TUBE, PERCUTANEOUS;  Surgeon: Mandeep Park MD;  [facility]    REPLACE GASTROJEJUNOSTOMY TUBE, PERCUTANEOUS N/A 12/09/2022    Procedure: REPLACEMENT,  GASTROJEJUNOSTOMY TUBE, PERCUTANEOUS with ENDOSCOPIC SUTURING.;  Surgeon: Mandepe Park MD;  [facility]    REPLACE GASTROJEJUNOSTOMY TUBE, PERCUTANEOUS N/A 2023    Procedure: Replace Gastrojejunostomy Tube, Percutaneous;  Surgeon: Mandeep Park MD;  [facility]    REPLACE GASTROJEJUNOSTOMY TUBE, PERCUTANEOUS N/A 2023    Procedure: Replace Gastrojejunostomy Tube, Percutaneous;  Surgeon: Francisco Dodson MD;  [facility]    REPLACE GASTROJEJUNOSTOMY TUBE, PERCUTANEOUS N/A 2023    Procedure: Replace Gastrojejunostomy Tube, Percutaneous;  Surgeon: Mandeep Park MD;  [facility]    REPLACE GASTROSTOMY TUBE, PERCUTANEOUS N/A 10/31/2024    Procedure: REPLACEMENT, GASTROSTOMY TUBE, PERCUTANEOUS;  Surgeon: Mandeep Park MD;  [facility]    REPLACE JEJUNOSTOMY TUBE, PERCUTANEOUS N/A 09/10/2021    Procedure: UPPER ENDOSCOPY, REPLACEMENT OF PERCUTANEOUS GASTROJEJUNOSTOMY TUBE, T-TAG GASTROPEXY;  Surgeon: Zackery Montoya MD;  [facility]    REPLACE JEJUNOSTOMY TUBE, PERCUTANEOUS N/A 2021    Procedure: REPLACEMENT, JEJUNOSTOMY TUBE, PERCUTANEOUS;  Surgeon: Mandeep Park MD;  [facility]    REPLACE JEJUNOSTOMY TUBE, PERCUTANEOUS N/A 2023    Procedure: REPLACEMENT, JEJUNOSTOMY TUBE, PERCUTANEOUS;  Surgeon: Mandeep Park MD;  [facility]    REPLACE JEJUNOSTOMY TUBE, PERCUTANEOUS  2023    Procedure: Replace Jejunostomy Tube, Percutaneous;  Surgeon: Mandeep Park MD;  [facility]    REPLACE JEJUNOSTOMY TUBE, PERCUTANEOUS N/A 2024    Procedure: REPLACEMENT, JEJUNOSTOMY TUBE, PERCUTANEOUS;  Surgeon: Francisco Dodson MD;  [facility]    REPLACE JEJUNOSTOMY TUBE, PERCUTANEOUS N/A 10/31/2024    Procedure: REPLACEMENT, JEJUNOSTOMY TUBE, PERCUTANEOUS;  Surgeon: Mandeep Park MD;  [facility]    transphenoidal pituitary resection  1990    ZZC  DELIVERY ONLY  1996     ZC  DELIVERY ONLY  1998    repeat c section with incidental cystotomy with repair    Z EXCIS PITUITARY,TRANSNASAL/SEPTAL  1980    pituitary tumor removed for diabetes insipidus    Z TOTAL ABDOM HYSTERECTOMY  1999    w/ bilateral salpingoophorectomy        Family History:  New changes since last visit:  none  Family History   Problem Relation Age of Onset    Lipids Mother     Hypertension Mother     Thyroid Disease Mother     Depression Mother     Angina Mother     GERD Mother     Skin Cancer Mother     Migraines Mother     Autoimmune Disease Mother     Hyperlipidemia Mother     Mental Illness Mother     Cerebrovascular Disease Mother         2023    Other Cancer Mother         skin-basil cell    Anxiety Disorder Mother     Post Operative Nausea and Vomiting Mother     Eye Disorder Father         cataract, detached retina    Myocardial Infarction Father 60    Lipids Father     Cerebrovascular Disease Father     Depression Father     Substance Abuse Father     Anesthesia Reaction Father         stroke right after surgery    Cataracts Father     Osteoarthritis Father     Ulcerative Colitis Father     Autoimmune Disease Father     Heart Disease Father     Hyperlipidemia Father     Mental Illness Father     Other Cancer Father         myloma    Anxiety Disorder Father     Interpersonal Violence Sister     Coronary Artery Disease Sister         angioplasty    GERD Sister     Substance Abuse Sister     Cerebrovascular Disease Sister             Depression Sister     Thyroid Disease Sister     Autoimmune Disease Sister     Depression Sister     Mental Illness Sister     Substance Abuse Sister     Thyroid Disease Sister     Eye Disorder Maternal Grandmother         cataract    Thyroid Disease Maternal Grandmother          of stroke at 81    Diabetes Maternal Grandfather     Eye Disorder Paternal Grandmother         cataract    Diabetes Paternal Grandmother     Eye Disorder  Paternal Grandfather         cataract    Diabetes Paternal Grandfather     Substance Abuse Paternal Grandfather     Eye Disorder Son         ptosis    Depression Son     Anxiety Disorder Son     Depression Son     Mental Illness Son     Anxiety Disorder Son     Heart Disease Paternal Aunt     Diabetes Paternal Aunt     Diabetes Paternal Uncle     Heart Disease Paternal Uncle     Depression Nephew     Anxiety Disorder Nephew     Thyroid Disease Nephew     Thyroid Disease Nephew     Anxiety Disorder Nephew     Diabetes Type 2  Cousin         paternal cousin    Autoimmune Disease Cousin        Social History:  Social History     Social History Narrative     with 3 children and a dog.  No smoking, etoh or drug use.  Worked as a  for [employer] in [place] in the past.  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     Physical Exam:  Vitals: /72 (BP Location: Right arm, Patient Position: Chair, Cuff Size: Adult Regular)   Pulse 96   Temp 98  F (36.7  C) (Oral)   Resp 16   Wt 71.8 kg (158 lb 6.4 oz)   SpO2 98%   BMI 24.09 kg/m    BMI= Body mass index is 24.09 kg/m .  General:  Appearance is normal, no acute distress  HEENT:  NC/AT, sclera appear white  Neck:  No obvious thyromegaly  CV/Lungs:  Non distressed breathing  Skin:  No apparent rashes  Neuro:  Normal mental status  Psych:  Normal affect

## 2025-04-03 NOTE — LETTER
4/3/2025      Dinora Mcghee  [address] [facility] [address]      Dear Colleague,    Thank you for referring your patient, Dinora Mcghee, to the [facility]. Please see a copy of my visit note below.                      [facility]  Islet Autotransplant, Diabetes Follow Up    Problem List:  Patient Active Problem List   Diagnosis     Hypothyroidism     Need for prophylactic immunotherapy     Sprain and strain of other specified sites of hip and thigh     Chondromalacia of patella     Sensorineural hearing loss     Vertiginous syndrome and labyrinthine disorder     Lumbago     Allergic rhinitis due to other allergen     GERD (gastroesophageal reflux disease)     Other type of intractable migraine     History of ERCP     Abdominal pain     S/P ERCP     Post-pancreatectomy diabetes (H)     Pancreatic insufficiency     Gastroparesis     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     Incisional hernia, without obstruction or gangrene     Adhesive capsulitis of shoulder, unspecified laterality     S/P hernia repair     Headache, chronic migraine without aura     Chronic pain syndrome     Iron deficiency anemia     Hypoglycemia     Adult failure to thrive     Abdominal pain, generalized     Gastrostomy tube obstruction (H)     Intra-abdominal abscess (H)     Postprocedural intraabdominal abscess (H)     Acquired absence of spleen     Depressive disorder     Diabetes insipidus     Other specified abnormal immunological findings in serum     Poisoning by drug     Sphincter of Oddi dysfunction     Type 1 diabetes mellitus without complication (H)     Myofascial pain     Feeding intolerance     Acute postoperative abdominal pain     Major depression, single episode     History of food intolerance     Malnutrition, unspecified type     Nausea and vomiting, unspecified vomiting type     On total parenteral nutrition (TPN)     Fever, unspecified fever cause     Chronic  pancreatitis, unspecified pancreatitis type (H)     Cholangitis (H)     Abdominal pain, unspecified abdominal location     Bacteremia     Constipation     Right upper quadrant abdominal pain     S/P pancreatic islet cell transplantation (H)     Dislodged jejunostomy tube           Assessment:  1.  Post pancreatectomy diabetes mellitus, s/p total pancreatectomy and islet autotransplant.  (ICD-10 E89.1)  2.  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   Gastroparesis - much improved following colectomy but high ostomy output  4.  Nausea, vomiting      Dinora is a 60 year old  with history of chronic pancreatitis who is s/p total pancreatectomy and islet autotransplant.  She was insulin independent after surgery, then on and off insulin with A1c in goal range in the past, but more recently has been consistently needing low dose insulin.  Major interval changes complicating diabetes management include: recent colectomy (Oct 2024). She was previously on TPN use, enteral feeds and is now only on oral feeds.    Pertinent issues today include:    # Fatigue - checking vitamin levels and we did get results consistent with significant iron deficiency, others pending.  Recommend IV iron. Our team will follow up with full recommendations as all labs return.  # Diabetes- A1c and CGM at goal but major issue is very insulin sensitive and has lability.  We are changing over to a hybrid closed loop pump (MOBI) which I think will be ideal for her.       Plan:  1.  Changes to current diabetes regimen:  Patient Instructions   1)  Diabetes plan- continue what you are doing until we have the Mobi insulin pump. Would plan to start with basal 0.2 unit/hour, bolus I:C ratio 20g, ISF ~100 mg/dL.    2)  Refill glucagon (mini glucagon dosing for now for lows, likely will be less often needed on automated insulin pump  though).    3)  Vitamin and iron levels.  - Initial results show low iron so we will follow up with plan.         2.  Frequency of blood sugar checks:  Keep wearing Yandy-- this is much better for Dinora than fingersticks because with fingersticks we miss the really critical data of the post prandial excursions.    3.  Continue routine follow up for autoislet transplant patients:  Mixed meal test (6 mL/kg BoostHP to max of 360 mL) at 3 months, 6 months, and once a year post transplant.  Hemoglobin A1c levels at these time points and quarterly.    4.  Other issues addressed today:  none    Follow up:  4 mos    Contact me for questions at [phone] or [phone].  Emergency number to reach pediatric endocrinology after hours is [phone].    Dr. Gloria Melissa MD  Professor, Pediatric Endocrinology  [facility]  Phone:  [phone]  Electronically signed: April 3, 2025 at 6:11 PM    40 minutes spent by me on the date of the encounter doing chart review, history and exam, documentation and further activities per the note         The longitudinal plan of care for the diagnosis(es)/condition(s) as documented were addressed during this visit. Due to the added complexity in care, I will continue to support Dinora in the subsequent management and with ongoing continuity of care.        HPI:  Dinora is a 60 year old  female here for follow up oflaparoscopic assisted total pancreatectomy, islet cell autotransplant, splenectomy, gastrojejunostomy tube revision, choledochoduodenostomy, duodenojejunostomy, Vera-Y reconstruction performed on 2016.  At the time of the procedure, the patient received:  Total Islet number: [id number].  Total Islet number/k.  Islet equivalents: [id number]  Islet equivalents/kilogram: 4091    Post-surgical course was complicated by two minor procedures for a retained foreign body near GJ site in 2017 and hernia repair 2018, and severe  gastroparesis (refractory to J-feeds, domperidone).  She was initially insulin independent at about 1 year after surgery.  Since then has been on/off low dose insulin and now more consistently /continually on insulin.   - Gastroparesis and GI dysmotility is her major issue post TPIAT. Admitted multiple times. GJ was helpful for short time but did not tolerate well.  Has tried multiple other approaches and continues to worsen, nothing offering benefit.  Tried expanded access domperidone but not helpful.  Finally responded to  total colectomy and ostomy for severe slow transit constipation 11/2024  - Also has hypoglycemia (prior treatments SGLT2 inhibitor not helpful, acarbose is helpful when eating, also uses mini glucagon).     At today's visit,   --  Major complaint is fatigue. She is up multiple times per night for ostomy high output and also for diabetes as she has hypoglycemia intermittently.  This is probably contributing.  However noted that since she was for a long time managed on TPN and followed with infusion labs, we have not for some time done the routine TPIAT labs here, so we have vitamin studies and iron ordered.  -- she did start MVW last visit (see last note)  -- Insulin dosing as below.  She has lability and has used mini dose glucagon multiple times this week and needs a refill.   -- Intestinal cramping at night with ostomy.  Also gets pills and food stuck in esophagus.  BUT has robustly gained back weight, BMI 24 and she looks healthy and is eating well.   -- Met with diabetes ed and we have ordered Mobi pump.  She is a great candidate for this.  Will also change to Dexcom G7 to work with pump.       Diabetes history:  Current insulin regimen:  Glargine, 6 units daily.  Humalog, getting yousif pen -- 0.5 unit per 10 grams with meals.   Covers post meal b/c of ongoing uncertainty of regarding how much her stomach can handle.       Wearing Yandy- pattern = post prandial hyperglycemia          Recent  hemoglobin A1c levels:      Lab Results   Component Value Date    A1C 5.8 04/03/2025    A1C 5.4 07/23/2024    A1C 5.6 03/20/2024    A1C 5.4 02/17/2022    A1C 5.2 11/27/2021    A1C 5.5 09/18/2021    A1C 5.4 08/05/2021    A1C 5.7 05/12/2021    A1C 5.9 04/01/2021    A1C 5.5 12/17/2020    A1C 5.8 07/30/2020         Patient is good candidate for CGM therapy.  Meets these criteria:  -- Has a diagnosis of diabetes,: This patient has type 1 diabetes secondary to pancreatectomy (surgical type 1)    --  Uses a home blood glucose monitor (BGM) and conduct four or more daily BGM tests, or is on CGM therapy:  CGM (jim)  --- Treated with insulin with multiple daily injections or a continuous subcutaneous insulin infusion (CSII) pump:  This patient is on MDI  --  Require frequent adjustments of the insulin treatment regimen, based on therapeutic CGM test results:  YES  -- The patient agrees to wear CGM and to share/upload data for our review and management.         Hypoglycemia history: mild but persistent/ bothersome. The patient has had 0 episodes of severe hypoglycemia (seizure, coma, or neuroglycopenic symptoms severe enough to require assistance from another person).  Blood sugars were reviewed from the patient records and/or the meter download.          Review of systems:  A complete ROS is negative except as noted in HPI above.      Past Medical and Surgical History:  Past Medical History:   Diagnosis Date     Allergic rhinitis, cause unspecified      Allergy to other foods     strawberries, apples, celeries, alice, watermelon     Arthritis     left knee     Choledocholithiasis     long after cholecystectomy     Chronic abdominal pain      Chronic constipation      Chronic nausea      Chronic pancreatitis (H)      Degeneration of lumbar or lumbosacral intervertebral disc      Depressive disorder     Situational. Would like to try weaning off Cymbalta     Esophageal reflux     w/ hiatal hernia     Gastroparesis      Hiatal  hernia      History of pituitary adenoma     s/p resection     Hypothyroidism      Migraines      Mild hyperlipidemia      On tube feeding diet     presence of GJ tube     Pancreatic disease     PD stricture, 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      PONV (postoperative nausea and vomiting)      Portacath in place      Type 1 diabetes mellitus without complication (H) 2022     Unspecified hearing loss     25% high frequency R     Past Surgical History:   Procedure Laterality Date     ABDOMEN SURGERY  1999    c sections: 93, 96, 98. endometriosis growth     APPENDECTOMY        SECTION  1993     CHOLECYSTECTOMY       COLONOSCOPY  2014     COLONOSCOPY N/A 2023    Procedure: COLONOSCOPY, WITH POLYPECTOMY AND BIOPSY;  Surgeon: Percy Chamorro MD;  [facility]     ENDOSCOPIC INSERTION TUBE GASTROSTOMY N/A 2021    Procedure: Gastrojejonostomy placement with jejunopexy, PEG tube exchange;  Surgeon: Zackery Montoya MD;  [facility]     ENDOSCOPIC RETROGRADE CHOLANGIOPANCREATOGRAM N/A 2015    Procedure: ENDOSCOPIC RETROGRADE CHOLANGIOPANCREATOGRAM;  Surgeon: Mandeep Park MD;  [facility]     ENDOSCOPIC RETROGRADE CHOLANGIOPANCREATOGRAM N/A 2016    Procedure: COMBINED ENDOSCOPIC RETROGRADE CHOLANGIOPANCREATOGRAPHY, PLACE TUBE/STENT;  Surgeon: Mandeep Park MD;  [facility]     ENDOSCOPIC RETROGRADE CHOLANGIOPANCREATOGRAM N/A 2016    Procedure: COMBINED ENDOSCOPIC RETROGRADE CHOLANGIOPANCREATOGRAPHY, REMOVE FOREIGN BODY OR STENT/TUBE;  Surgeon: Mandeep Park MD;  [facility]     ENDOSCOPIC RETROGRADE CHOLANGIOPANCREATOGRAM N/A 2016    Procedure: COMBINED ENDOSCOPIC RETROGRADE CHOLANGIOPANCREATOGRAPHY, PLACE TUBE/STENT;  Surgeon: Mandeep Park MD;  [facility]     ENDOSCOPIC RETROGRADE CHOLANGIOPANCREATOGRAM N/A 2016    Procedure: COMBINED ENDOSCOPIC RETROGRADE  CHOLANGIOPANCREATOGRAPHY, REMOVE FOREIGN BODY OR STENT/TUBE;  Surgeon: Mandeep Park MD;  [facility]     ENDOSCOPIC ULTRASOUND UPPER GASTROINTESTINAL TRACT (GI) N/A 10/03/2016    Procedure: ENDOSCOPIC ULTRASOUND, ESOPHAGOSCOPY / UPPER GASTROINTESTINAL TRACT (GI);  Surgeon: Guru Jose Rodas MD;  [facility]     ENT SURGERY  1989    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     ENTEROSCOPY SMALL BOWEL N/A 02/03/2022    Procedure: ENTEROSCOPY with possible fistula closure;  Surgeon: Francisco Dodson MD;  Location:  GI     ENTEROSCOPY SMALL BOWEL N/A 09/11/2024    Procedure: Upper endoscopy, gastrostomy tube placement and jejunostomy tube exchange;  Surgeon: Zackery Montoya MD;  [facility]     ESOPHAGOSCOPY, GASTROSCOPY, DUODENOSCOPY (EGD), COMBINED N/A 06/24/2015    Procedure: COMBINED ESOPHAGOSCOPY, GASTROSCOPY, DUODENOSCOPY (EGD), REMOVE FOREIGN BODY;  Surgeon: Mandeep Park MD;  [facility]     ESOPHAGOSCOPY, GASTROSCOPY, DUODENOSCOPY (EGD), COMBINED N/A 10/25/2015    Procedure: COMBINED ESOPHAGOSCOPY, GASTROSCOPY, DUODENOSCOPY (EGD);  Surgeon: Sammy Amaro MD;  [facility]     ESOPHAGOSCOPY, GASTROSCOPY, DUODENOSCOPY (EGD), COMBINED N/A 10/25/2015    Procedure: COMBINED ESOPHAGOSCOPY, GASTROSCOPY, DUODENOSCOPY (EGD), BIOPSY SINGLE OR MULTIPLE;  Surgeon: Sammy Amaro MD;  [facility]     ESOPHAGOSCOPY, GASTROSCOPY, DUODENOSCOPY (EGD), COMBINED N/A 01/31/2023    Procedure: ESOPHAGOGASTRODUODENOSCOPY (EGD) with peg replacement ;  Surgeon: Mandeep Park MD;  [facility]     ESOPHAGOSCOPY, GASTROSCOPY, DUODENOSCOPY (EGD), COMBINED N/A 07/24/2024    Procedure: Esophagoscopy, gastroscopy, duodenoscopy (EGD), combined;  Surgeon: Zackery Montoya MD;  [facility]     ESOPHAGOSCOPY, GASTROSCOPY, DUODENOSCOPY (EGD), COMBINED N/A 10/31/2024    Procedure: Esophagoscopy, gastroscopy, duodenoscopy (EGD), combined;   Surgeon: Mandeep Park MD;  [facility]     ESOPHAGOSCOPY, GASTROSCOPY, DUODENOSCOPY (EGD), DILATATION, COMBINED       EXCISE LESION TRUNK N/A 04/17/2017    Procedure: EXCISE LESION TRUNK;  Removal of Abdominal Foreign Body;  Surgeon: Nestor Phoenix MD;  [facility]     GENITOURINARY SURGERY  1999    Hysterectomy     HC ESOPH/GAS REFLUX TEST W NASAL IMPED >1 HR N/A 11/19/2015    Procedure: ESOPHAGEAL IMPEDENCE FUNCTION TEST WITH 24 HOUR PH GREATER THAN 1 HOUR;  Surgeon: Thiago Apple MD;  [facility]     HC UGI ENDOSCOPY DIAG W BIOPSY  09/17/2008     HC UGI ENDOSCOPY DIAG W BIOPSY  09/27/2012     HC UGI ENDOSCOPY W ESOPHAGEAL DILATION BALLOON <30MM  09/17/2008     HC UGI ENDOSCOPY W EUS N/A 05/05/2015    Procedure: COMBINED ENDOSCOPIC ULTRASOUND, ESOPHAGOSCOPY, GASTROSCOPY, DUODENOSCOPY (EGD);  Surgeon: Wm Dueñas MD;  [facility]     HC WRIST ARTHROSCOP,RELEASE XVERS LIG Bilateral 12/17/2008     INJECT TRANSVERSUS ABDOMINIS PLANE (TAP) BLOCK BILATERAL Left 09/22/2016    Procedure: INJECT TRANSVERSUS ABDOMINIS PLANE (TAP) BLOCK BILATERAL;  Surgeon: Dickson Corrigan MD;  [facility]     INJECT TRIGGER POINT Bilateral 09/08/2022    Procedure: Abdominal trigger point injection with ultrasound;  Surgeon: Monika Mahajan MD;  [facility]     INJECT TRIGGER POINT SINGLE / MULTIPLE 3 OR MORE MUSCLES Right 11/15/2022    Procedure: Trigger point injections right abdomen with ultrasound guidance;  Surgeon: Monika Mahajan MD;  [facility]: [facility]     IR CHEST PORT PLACEMENT < 5 YRS OF AGE  06/10/2022     IR CHEST PORT PLACEMENT > 5 YRS OF AGE  01/29/2025     IR CVC TUNNEL PLACEMENT > 5 YRS OF AGE  04/15/2024     IR CVC TUNNEL REMOVAL RIGHT  01/29/2025     IR PORT REMOVAL RIGHT  11/09/2023     LAPAROSCOPIC ASSISTED COLECTOMY N/A 10/11/2024    Procedure: Exploratory laparotomy, extensive lysis of adhesions, revision of Jtube and small bowel resection, TOTAL ABDOMINAL COLECTOMY  WITH ILEORECTAL ANASTAMOSIS, DIVERTING LOOP ILEOSTOMY, flexible sigmoidoscopy;  Surgeon: Kaylene Branch MD;  [facility]     laparoscopic pineda  01/01/1995     LAPAROSCOPIC HERNIORRHAPHY INCISIONAL N/A 08/23/2018    Procedure: LAPAROSCOPIC HERNIORRHAPHY INCISIONAL;  Laparoscopic Incisional Hernia Repair with Symbotex Mesh Implant;  Surgeon: Nestor Phoenix MD;  [facility]     LAPAROSCOPIC PANCREATECTOMY, TRANSPLANT AUTO ISLET CELL N/A 12/28/2016    Procedure: LAPAROSCOPIC PANCREATECTOMY, TRANSPLANT AUTO ISLET CELL;  Surgeon: Nestor Phoenix MD;  [facility]     LAPAROTOMY EXPLORATORY N/A 01/31/2023    Procedure: Exploratory Laparotomy, lysis of adhesions, Perforated J-Junostomy Resection, Replace J-Junostomy site;  Surgeon: Elver Bauer MD;  [facility]     LAPAROTOMY, LYSIS ADHESIONS, COMBINED N/A 01/31/2023    Procedure: Dilatation of jejunostomy feeding tube, track with placement of jejunostomy tube with fluoroscopy;  Surgeon: Elver Bauer MD;  [facility]     PICC DOUBLE LUMEN PLACEMENT Left 11/13/2023    Basilic Vein 5F DL 43 cm     REMOVE AND REPLACE BREAST IMPLANT PROSTHESIS N/A 12/30/2022    Procedure: Exploratory Laparotomy, lysis of adhesions, small bowel resection. Placement of gastric jejunostomy for enteral feeding.;  Surgeon: Elver Bauer MD;  [facility]     REMOVE GASTROSTOMY TUBE ADULT N/A 01/28/2022    Procedure: REMOVAL, GASTROSTOMY TUBE, ADULT;  Surgeon: Mandeep Park MD;  [facility]     REMOVE GASTROSTOMY TUBE ADULT N/A 12/02/2024    Procedure: Remove gastrostomy tube adult- PEG;  Surgeon: Mandeep Park MD;  [facility]     REPLACE GASTROJEJUNOSTOMY TUBE, PERCUTANEOUS N/A 09/07/2021    Procedure: GASTROJEJUNOSTOMY TUBE PLACEMENT, PERCUTANEOUS, WITH GASTROPEXY;  Surgeon: Mandeep Park MD;  [facility]     REPLACE GASTROJEJUNOSTOMY TUBE, PERCUTANEOUS N/A 09/22/2021    Procedure:  REPLACEMENT, GASTROJEJUNOSTOMY TUBE, PERCUTANEOUS;  Surgeon: Zackery Montoya MD;  [facility]     REPLACE GASTROJEJUNOSTOMY TUBE, PERCUTANEOUS N/A 11/22/2022    Procedure: REPLACEMENT, GASTROJEJUNOSTOMY TUBE, PERCUTANEOUS;  Surgeon: Mandeep Park MD;  [facility]     REPLACE GASTROJEJUNOSTOMY TUBE, PERCUTANEOUS N/A 12/09/2022    Procedure: REPLACEMENT, GASTROJEJUNOSTOMY TUBE, PERCUTANEOUS with ENDOSCOPIC SUTURING.;  Surgeon: Mandeep Park MD;  [facility]     REPLACE GASTROJEJUNOSTOMY TUBE, PERCUTANEOUS N/A 08/01/2023    Procedure: Replace Gastrojejunostomy Tube, Percutaneous;  Surgeon: Mandeep Park MD;  [facility]     REPLACE GASTROJEJUNOSTOMY TUBE, PERCUTANEOUS N/A 11/11/2023    Procedure: Replace Gastrojejunostomy Tube, Percutaneous;  Surgeon: Francisco Dodson MD;  [facility]     REPLACE GASTROJEJUNOSTOMY TUBE, PERCUTANEOUS N/A 12/08/2023    Procedure: Replace Gastrojejunostomy Tube, Percutaneous;  Surgeon: Mandeep Park MD;  [facility]     REPLACE GASTROSTOMY TUBE, PERCUTANEOUS N/A 10/31/2024    Procedure: REPLACEMENT, GASTROSTOMY TUBE, PERCUTANEOUS;  Surgeon: Mandeep Park MD;  [facility]     REPLACE JEJUNOSTOMY TUBE, PERCUTANEOUS N/A 09/10/2021    Procedure: UPPER ENDOSCOPY, REPLACEMENT OF PERCUTANEOUS GASTROJEJUNOSTOMY TUBE, T-TAG GASTROPEXY;  Surgeon: Zackery Montoya MD;  [facility]     REPLACE JEJUNOSTOMY TUBE, PERCUTANEOUS N/A 12/29/2021    Procedure: REPLACEMENT, JEJUNOSTOMY TUBE, PERCUTANEOUS;  Surgeon: Mandeep Park MD;  [facility]     REPLACE JEJUNOSTOMY TUBE, PERCUTANEOUS N/A 05/04/2023    Procedure: REPLACEMENT, JEJUNOSTOMY TUBE, PERCUTANEOUS;  Surgeon: Mandeep Park MD;  [facility]     REPLACE JEJUNOSTOMY TUBE, PERCUTANEOUS  08/01/2023    Procedure: Replace Jejunostomy Tube, Percutaneous;  Surgeon: Mandeep Park MD;  [facility]     REPLACE JEJUNOSTOMY TUBE,  PERCUTANEOUS N/A 2024    Procedure: REPLACEMENT, JEJUNOSTOMY TUBE, PERCUTANEOUS;  Surgeon: Francisco Dodson MD;  [facility]     REPLACE JEJUNOSTOMY TUBE, PERCUTANEOUS N/A 10/31/2024    Procedure: REPLACEMENT, JEJUNOSTOMY TUBE, PERCUTANEOUS;  Surgeon: Mandeep Park MD;  [facility]     transphenoidal pituitary resection  1990     [facility]  DELIVERY ONLY  1996     [facility]  DELIVERY ONLY  1998    repeat c section with incidental cystotomy with repair     [facility] EXCIS PITUITARY,TRANSNASAL/SEPTAL  1980    pituitary tumor removed for diabetes insipidus     [facility] TOTAL ABDOM HYSTERECTOMY  1999    w/ bilateral salpingoophorectomy        Family History:  New changes since last visit:  none  Family History   Problem Relation Age of Onset     Lipids Mother      Hypertension Mother      Thyroid Disease Mother      Depression Mother      Angina Mother      GERD Mother      Skin Cancer Mother      Migraines Mother      Autoimmune Disease Mother      Hyperlipidemia Mother      Mental Illness Mother      Cerebrovascular Disease Mother         2023     Other Cancer Mother         skin-basil cell     Anxiety Disorder Mother      Post Operative Nausea and Vomiting Mother      Eye Disorder Father         cataract, detached retina     Myocardial Infarction Father 60     Lipids Father      Cerebrovascular Disease Father      Depression Father      Substance Abuse Father      Anesthesia Reaction Father         stroke right after surgery     Cataracts Father      Osteoarthritis Father      Ulcerative Colitis Father      Autoimmune Disease Father      Heart Disease Father      Hyperlipidemia Father      Mental Illness Father      Other Cancer Father         myloma     Anxiety Disorder Father      Interpersonal Violence Sister      Coronary Artery Disease Sister         angioplasty     GERD Sister      Substance Abuse Sister      Cerebrovascular Disease Sister               Depression Sister      Thyroid Disease Sister      Autoimmune Disease Sister      Depression Sister      Mental Illness Sister      Substance Abuse Sister      Thyroid Disease Sister      Eye Disorder Maternal Grandmother         cataract     Thyroid Disease Maternal Grandmother          of stroke at 81     Diabetes Maternal Grandfather      Eye Disorder Paternal Grandmother         cataract     Diabetes Paternal Grandmother      Eye Disorder Paternal Grandfather         cataract     Diabetes Paternal Grandfather      Substance Abuse Paternal Grandfather      Eye Disorder Son         ptosis     Depression Son      Anxiety Disorder Son      Depression Son      Mental Illness Son      Anxiety Disorder Son      Heart Disease Paternal Aunt      Diabetes Paternal Aunt      Diabetes Paternal Uncle      Heart Disease Paternal Uncle      Depression Nephew      Anxiety Disorder Nephew      Thyroid Disease Nephew      Thyroid Disease Nephew      Anxiety Disorder Nephew      Diabetes Type 2  Cousin         paternal cousin     Autoimmune Disease Cousin        Social History:  Social History     Social History Narrative     with 3 children and a dog.  No smoking, etoh or drug use.  Worked as a  for [employer] in [employer] in the past.  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     Physical Exam:  Vitals: /72 (BP Location: Right arm, Patient Position: Chair, Cuff Size: Adult Regular)   Pulse 96   Temp 98  F (36.7  C) (Oral)   Resp 16   Wt 71.8 kg (158 lb 6.4 oz)   SpO2 98%   BMI 24.09 kg/m    BMI= Body mass index is 24.09 kg/m .  General:  Appearance is normal, no acute distress  HEENT:  NC/AT, sclera appear white  Neck:  No obvious thyromegaly  CV/Lungs:  Non distressed breathing  Skin:  No apparent rashes  Neuro:  Normal mental  status  Psych:  Normal affect      Again, thank you for allowing me to participate in the care of your patient.        Sincerely,        Gloria Melissa MD    Electronically signed

## 2025-04-03 NOTE — NURSING NOTE
"Chief Complaint   Patient presents with    Follow Up     Return auto islet     Vital signs:  Temp: 98  F (36.7  C) Temp src: Oral BP: 112/72 Pulse: 96   Resp: 16 SpO2: 98 %       Weight: 71.8 kg (158 lb 6.4 oz)  Estimated body mass index is 24.09 kg/m  as calculated from the following:    Height as of 3/12/25: 1.727 m (5' 7.99\").    Weight as of this encounter: 71.8 kg (158 lb 6.4 oz).      iJmmy Heredia RN on 4/3/2025 at 3:49 PM    "

## 2025-04-03 NOTE — PATIENT INSTRUCTIONS
1)  Diabetes plan- continue what you are doing until we have the Mobi insulin pump. Would plan to start with basal 0.2 unit/hour, bolus I:C ratio 20g, ISF ~100 mg/dL.    2)  Refill glucagon (mini glucagon dosing for now for lows, likely will be less often needed on automated insulin pump though).    3)  Vitamin and iron levels.  - Initial results show low iron so we will follow up with plan.

## 2025-04-04 PROCEDURE — A4215 STERILE NEEDLE: HCPCS

## 2025-04-04 PROCEDURE — A4208 3 CC STERILE SYRINGE&NEEDLE: HCPCS

## 2025-04-05 PROCEDURE — S9379 HIT NOC PER DIEM: HCPCS

## 2025-04-05 PROCEDURE — S9542 HT INJ NOC PER DIEM: HCPCS | Mod: SH

## 2025-04-06 PROCEDURE — S9542 HT INJ NOC PER DIEM: HCPCS | Mod: SH

## 2025-04-06 PROCEDURE — S9379 HIT NOC PER DIEM: HCPCS

## 2025-04-07 LAB
A-TOCOPHEROL VIT E SERPL-MCNC: 6.9 MG/L
ANNOTATION COMMENT IMP: NORMAL
BETA+GAMMA TOCOPHEROL SERPL-MCNC: 0.3 MG/L
RETINYL PALMITATE SERPL-MCNC: <0.02 MG/L
VIT A SERPL-MCNC: 0.42 MG/L

## 2025-04-07 PROCEDURE — S9542 HT INJ NOC PER DIEM: HCPCS | Mod: SH

## 2025-04-07 PROCEDURE — S9379 HIT NOC PER DIEM: HCPCS

## 2025-04-08 ENCOUNTER — TELEPHONE (OUTPATIENT)
Dept: TRANSPLANT | Facility: CLINIC | Age: 60
End: 2025-04-08
Payer: COMMERCIAL

## 2025-04-08 PROCEDURE — S9542 HT INJ NOC PER DIEM: HCPCS | Mod: SH

## 2025-04-08 PROCEDURE — S9379 HIT NOC PER DIEM: HCPCS

## 2025-04-08 ASSESSMENT — HEADACHE IMPACT TEST (HIT 6)
HOW OFTEN DID HEADACHS LIMIT CONCENTRATION ON WORK OR DAILY ACTIVITY: VERY OFTEN
HIT6 TOTAL SCORE: 67
HOW OFTEN HAVE YOU FELT FED UP OR IRRITATED BECAUSE OF YOUR HEADACHES: SOMETIMES
HOW OFTEN DO HEADACHES LIMIT YOUR DAILY ACTIVITIES: VERY OFTEN
WHEN YOU HAVE A HEADACHE HOW OFTEN DO YOU WISH YOU COULD LIE DOWN: ALWAYS
HOW OFTEN HAVE YOU FELT TOO TIRED TO WORK BECAUSE OF YOUR HEADACHES: VERY OFTEN
WHEN YOU HAVE HEADACHES HOW OFTEN IS THE PAIN SEVERE: VERY OFTEN

## 2025-04-08 ASSESSMENT — MIGRAINE DISABILITY ASSESSMENT (MIDAS)
TOTAL SCORE: 17
HOW MANY DAYS WAS HOUSEWORK PRODUCTIVITY CUT IN HALF DUE TO HEADACHES: 6
HOW OFTEN WERE SOCIAL ACTIVITIES MISSED DUE TO HEADACHES: 5
HOW MANY DAYS DID YOU NOT DO HOUSEWORK BECAUSE OF HEADACHES: 6
HOW MANY DAYS IN THE PAST 3 MONTHS HAVE YOU HAD A HEADACHE: 10
HOW MANY DAYS WAS YOUR PRODUCTIVITY CUT IN HALF BECAUSE OF HEADACHES: 0
HOW MANY DAYS DID YOU MISS WORK OR SCHOOL BECAUSE OF HEADACHES: 0
ON A SCALE FROM 0-10 ON AVERAGE HOW PAINFUL WERE HEADACHES: 8

## 2025-04-08 NOTE — LETTER
PHYSICIAN ORDERS  Dr Rossi Andrade [facility]   [phone] (Work)   [phone] (Fax)    DATE & TIME ISSUED: 2025 10:33 AM  PATIENT NAME: Dinora Mcghee   : 1965     [facility] MR# [if applicable]: [id number]     DIAGNOSIS:  S/P pancreatic islet cell transplantation (H); Iron Deficiency Anemia; Intestinal Malabsorption following GI surgery   ICD-10 CODE:  Z94.83; D50.9; K91.2  Orders:   Iron Infusion (for iron deficiency anemia)  Two doses Injectafer 750 mg IV administered at least one week apart.   May also use Venofer , Ferrlecit  or Feraheme if Injectafer not covered by insurance.  (Avoid steroid use for pre medication).   Recheck iron panel (iron saturation, Ferritin) and Hgb 4-8 weeks later.     Any questions please call [phone] and ask for patients coordinator         Dr. Gloria Melissa MD  [facility] [place]  Director of Research, [facility]  Phone:  [phone]  Electronically signed: 2025 at 10:40 AM

## 2025-04-09 ENCOUNTER — OFFICE VISIT (OUTPATIENT)
Dept: NEUROLOGY | Facility: CLINIC | Age: 60
End: 2025-04-09
Payer: COMMERCIAL

## 2025-04-09 VITALS
BODY MASS INDEX: 23.88 KG/M2 | OXYGEN SATURATION: 99 % | WEIGHT: 157 LBS | SYSTOLIC BLOOD PRESSURE: 115 MMHG | DIASTOLIC BLOOD PRESSURE: 77 MMHG | HEART RATE: 92 BPM

## 2025-04-09 DIAGNOSIS — G43.709 CHRONIC MIGRAINE W/O AURA W/O STATUS MIGRAINOSUS, NOT INTRACTABLE: Primary | ICD-10-CM

## 2025-04-09 DIAGNOSIS — G43.109 MIGRAINE WITH AURA AND WITHOUT STATUS MIGRAINOSUS, NOT INTRACTABLE: ICD-10-CM

## 2025-04-09 PROCEDURE — 3074F SYST BP LT 130 MM HG: CPT | Performed by: NURSE PRACTITIONER

## 2025-04-09 PROCEDURE — 3078F DIAST BP <80 MM HG: CPT | Performed by: NURSE PRACTITIONER

## 2025-04-09 PROCEDURE — 99213 OFFICE O/P EST LOW 20 MIN: CPT | Performed by: NURSE PRACTITIONER

## 2025-04-09 PROCEDURE — S9542 HT INJ NOC PER DIEM: HCPCS

## 2025-04-09 PROCEDURE — G2211 COMPLEX E/M VISIT ADD ON: HCPCS | Performed by: NURSE PRACTITIONER

## 2025-04-09 RX ORDER — ZOLMITRIPTAN 5 MG/1
5 TABLET, ORALLY DISINTEGRATING ORAL
Qty: 12 TABLET | Refills: 11 | Status: SHIPPED | OUTPATIENT
Start: 2025-04-09

## 2025-04-09 NOTE — PATIENT INSTRUCTIONS
Acute Treatment:  -For acute treatment of mild headache, take acetaminophen as needed. Do not exceed more than 14 days per month to avoid medication overuse.  -For acute treatment of moderate to severe headache, take zolmitriptan at the onset of headache, with a repeat dose in two hours if needed. Do not exceed more than 9 days per month to avoid medication overuse.  -For headache related nausea, or as a rescue medicine for headache, take prochlorperazine or promethazine  as needed.     Preventive Treatment:  -For headache prevention, continue Botox -will request up to 200 units every 12 weeks   Nurtec every other day     Follow up as scheduled for Botox  Non Botox -once a year if stable or sooner if needed if getting worse

## 2025-04-09 NOTE — LETTER
4/9/2025       RE: iDnora Mcghee  [address] [facility] [address]     Dear Colleague,    Thank you for referring your patient, Dinora Mcghee, to the [facility]UR[facility] [place] at [facility]. Please see a copy of my visit note below.    [facility]    Headache Neurology Progress Note  April 9, 2025      Assessment/Plan:   Dinora Mcghee is a 60 year old   Chronic migraines  Worsening due to other causes -allergies and low iron.  If no headache improvement over time or getting worse it would not be unreasonable to get an updated brain MRI for any structural problems.  And meanwhile we will try to optimize headache prevention and see if it helps with headaches uptake during spring/allergy season.  Acute Treatment:  -For acute treatment of mild headache, take acetaminophen as needed. Do not exceed more than 14 days per month to avoid medication overuse.  -For acute treatment of moderate to severe headache, take zolmitriptan at the onset of headache, with a repeat dose in two hours if needed. Do not exceed more than 9 days per month to avoid medication overuse.  -For headache related nausea, or as a rescue medicine for headache, take prochlorperazine or promethazine  as needed.     Preventive Treatment:  -For headache prevention, continue Botox -will request up to 200 units every 12 weeks -patient was approved for up to 200 units of Botox every 12 weeks  Nurtec every other day     Follow up as scheduled for Botox  Non Botox -once a year if stable or sooner if needed if getting worse       The longitudinal plan of care for chronic migraines for Dinora was addressed during this visit. Due to the added complexity in care, I will continue to support Dinora in the subsequent management of this condition(s) and with the ongoing continuity of care of this condition(s).      26 minutes spent on the date of the encounter doing   face to face  access, chart  review,  results review,  meds review, treatment plan, documentation and further activities as noted above    NATALY Andrew, CNP [facility]      Subjective:    Dinora Mcghee returns for follow up of headaches  Headaches worsen recently with other chronic health conditions  and fatigued.  Low iron might be contributing.  Will continue monitoring for worsening  Migraines about the same but seems more frequent.  nurtec helps with migraines. Has to take it at least once per week and a week before Botox -almost every day needs to take rescue treatment -nurtec or zolmitriptan  Low iron and waiting for infusion. Headaches daily at 5/10 on the numeric pain scale and nausea and fatigue. Spring is always harder and allergy season.  Discussed headache treatment plan and will try to optimize her treatment with increasing Botox dose up to 200 units to minimize Botox wearing off phenomena and trying out Nurtec every other day at least for spring season when headaches like to spiked along with other chronic conditions.  No new headache symptoms we will monitor for worsening.  IV LR every other day -hydration -helps  Has been taking amitriptyline 60 mg at bedtime -pain management and headaches    PMH:  Past Medical History:   Diagnosis Date     Allergic rhinitis, cause unspecified      Allergy to other foods     strawberries, apples, celeries, alice, watermelon     Arthritis     left knee     Choledocholithiasis     long after cholecystectomy     Chronic abdominal pain      Chronic constipation      Chronic nausea      Chronic pancreatitis (H)      Degeneration of lumbar or lumbosacral intervertebral disc      Depressive disorder     Situational. 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 Speech clear and fluent.   Cranial Nerves: Facial movements symmetric.   Motor: No abnormal movements.      Pertinent Investigations:            11/6/2024    10:37 AM 1/28/2025     3:18 PM 4/8/2025     8:16 PM   HIT-6   When you have headaches, how often is the pain severe 11 11 11   How often do headaches limit your ability to do usual daily activities including household work, work, school, or social activities? 10 10 11   When you have a headache, how often do you wish you could lie down? 13 13 13   In the past 4 weeks, how often have you felt too tired to do work or daily activities because of your headaches 10 10 11   In the past 4 weeks, how often have you felt fed up or irritated because of your headaches 8 10 10   In the past 4 weeks, how often did headaches limit your ability to concentrate on work or daily activities 10 8 11   HIT-6 Total Score 62  62  67        Patient-reported           8/3/2022    12:41 PM 2/4/2024     3:15 PM 4/8/2025     8:20 PM   MIDAS - in the past three months:   On how many days did you miss work or school because of your headaches? 3 18 0   How many days was your productivity at work or school reduced by half or more because of your headaches? 15 0 0   On how many days did you not do household work because of your headaches? 10 9 6   How many days was your productivity in household work  reduced by half or more because of your headaches? 15 9 6   On how many days did you miss family, social, or leisure activities because of your headaches? 3 4 5   On how many days did you have a headache? 46 20 10   On a scale of 0-10, on average how painful were these headaches? 5 8 8   MIDAS Score 46 (IV - Severe Disability) 40 (IV - Severe Disability) 17 (III - Moderate Disability)          Again, thank you for allowing me to participate in the care of your patient.      Sincerely,    NATALY Hoffman CNP

## 2025-04-09 NOTE — PROGRESS NOTES
[facility]    Headache Neurology Progress Note  April 9, 2025      Assessment/Plan:   Dinora Mcghee is a 60 year old   Chronic migraines  Worsening due to other causes -allergies and low iron.  If no headache improvement over time or getting worse it would not be unreasonable to get an updated brain MRI for any structural problems.  And meanwhile we will try to optimize headache prevention and see if it helps with headaches uptake during spring/allergy season.  Acute Treatment:  -For acute treatment of mild headache, take acetaminophen as needed. Do not exceed more than 14 days per month to avoid medication overuse.  -For acute treatment of moderate to severe headache, take zolmitriptan at the onset of headache, with a repeat dose in two hours if needed. Do not exceed more than 9 days per month to avoid medication overuse.  -For headache related nausea, or as a rescue medicine for headache, take prochlorperazine or promethazine  as needed.     Preventive Treatment:  -For headache prevention, continue Botox -will request up to 200 units every 12 weeks -patient was approved for up to 200 units of Botox every 12 weeks  Nurtec every other day     Follow up as scheduled for Botox  Non Botox -once a year if stable or sooner if needed if getting worse       The longitudinal plan of care for chronic migraines for Dinora was addressed during this visit. Due to the added complexity in care, I will continue to support Dinora in the subsequent management of this condition(s) and with the ongoing continuity of care of this condition(s).      26 minutes spent on the date of the encounter doing  face to face  access, chart  review,  results review,  meds review, treatment plan, documentation and further activities as noted above    NATALY Andrew, CNP [facility]      Subjective:    Dinora Mcghee returns for follow up of headaches  Headaches worsen recently with other  chronic health conditions  and fatigued.  Low iron might be contributing.  Will continue monitoring for worsening  Migraines about the same but seems more frequent.  nurtec helps with migraines. Has to take it at least once per week and a week before Botox -almost every day needs to take rescue treatment -nurtec or zolmitriptan  Low iron and waiting for infusion. Headaches daily at 5/10 on the numeric pain scale and nausea and fatigue. Spring is always harder and allergy season.  Discussed headache treatment plan and will try to optimize her treatment with increasing Botox dose up to 200 units to minimize Botox wearing off phenomena and trying out Nurtec every other day at least for spring season when headaches like to spiked along with other chronic conditions.  No new headache symptoms we will monitor for worsening.  IV LR every other day -hydration -helps  Has been taking amitriptyline 60 mg at bedtime -pain management and headaches    PMH:  Past Medical History:   Diagnosis Date    Allergic rhinitis, cause unspecified     Allergy to other foods     strawberries, apples, celeries, alice, watermelon    Arthritis     left knee    Choledocholithiasis     long after cholecystectomy    Chronic abdominal pain     Chronic constipation     Chronic nausea     Chronic pancreatitis (H)     Degeneration of lumbar or lumbosacral intervertebral disc     Depressive disorder     Situational. 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 Speech clear and fluent.   Cranial Nerves: Facial movements symmetric.   Motor: No abnormal movements.      Pertinent Investigations:            11/6/2024    10:37 AM 1/28/2025     3:18 PM 4/8/2025     8:16 PM   HIT-6   When you have headaches, how often is the pain severe 11 11 11   How often do headaches limit your ability to do usual daily activities including household work, work, school, or social activities? 10 10 11   When you have a headache, how often do you wish you could lie down? 13 13 13   In the past 4 weeks, how often have you felt too tired to do work or daily activities because of your headaches 10 10 11   In the past 4 weeks, how often have you felt fed up or irritated because of your headaches 8 10 10   In the past 4 weeks, how often did headaches limit your ability to concentrate on work or daily activities 10 8 11   HIT-6 Total Score 62  62  67        Patient-reported           8/3/2022    12:41 PM 2/4/2024     3:15 PM 4/8/2025     8:20 PM   MIDAS - in the past three months:   On how many days did you miss work or school because of your headaches? 3 18 0   How many days was your productivity at work or school reduced by half or more because of your headaches? 15 0 0   On how many days did you not do household work because of your headaches? 10 9 6   How many days was your productivity in household work reduced by half or more because of your headaches? 15 9 6   On how many days did you miss family, social, or leisure activities because of your headaches? 3 4 5   On how many days did you have a headache? 46 20 10   On a scale of 0-10, on average how painful were these headaches? 5 8 8   MIDAS Score 46 (IV - Severe  Disability) 40 (IV - Severe Disability) 17 (III - Moderate Disability)

## 2025-04-10 ENCOUNTER — HOME INFUSION (OUTPATIENT)
Dept: HOME HEALTH SERVICES | Facility: HOME HEALTH | Age: 60
End: 2025-04-10
Payer: COMMERCIAL

## 2025-04-10 ENCOUNTER — HOME INFUSION BILLING (OUTPATIENT)
Dept: HOME HEALTH SERVICES | Facility: HOME HEALTH | Age: 60
End: 2025-04-10
Payer: COMMERCIAL

## 2025-04-10 PROCEDURE — A4215 STERILE NEEDLE: HCPCS

## 2025-04-10 PROCEDURE — S9542 HT INJ NOC PER DIEM: HCPCS

## 2025-04-10 PROCEDURE — A4208 3 CC STERILE SYRINGE&NEEDLE: HCPCS

## 2025-04-10 PROCEDURE — A4216 STERILE WATER/SALINE, 10 ML: HCPCS

## 2025-04-11 PROCEDURE — S9542 HT INJ NOC PER DIEM: HCPCS

## 2025-04-12 PROCEDURE — S9542 HT INJ NOC PER DIEM: HCPCS | Mod: SH

## 2025-04-12 PROCEDURE — S9379 HIT NOC PER DIEM: HCPCS

## 2025-04-13 PROCEDURE — S9379 HIT NOC PER DIEM: HCPCS

## 2025-04-13 PROCEDURE — S9542 HT INJ NOC PER DIEM: HCPCS | Mod: SH

## 2025-04-14 ENCOUNTER — LAB REQUISITION (OUTPATIENT)
Dept: LAB | Facility: CLINIC | Age: 60
End: 2025-04-14

## 2025-04-14 ENCOUNTER — HOSPITAL ENCOUNTER (OUTPATIENT)
Dept: RESEARCH | Facility: CLINIC | Age: 60
Discharge: HOME | End: 2025-04-14
Attending: PEDIATRICS
Payer: COMMERCIAL

## 2025-04-14 ENCOUNTER — OFFICE VISIT (OUTPATIENT)
Dept: NEUROLOGY | Facility: CLINIC | Age: 60
End: 2025-04-14
Payer: COMMERCIAL

## 2025-04-14 ENCOUNTER — ALLIED HEALTH/NURSE VISIT (OUTPATIENT)
Dept: OPHTHALMOLOGY | Facility: CLINIC | Age: 60
End: 2025-04-14
Attending: OPHTHALMOLOGY
Payer: COMMERCIAL

## 2025-04-14 DIAGNOSIS — Z00.6 EXAMINATION OF PARTICIPANT OR CONTROL IN CLINICAL RESEARCH: Primary | ICD-10-CM

## 2025-04-14 DIAGNOSIS — Z00.6 RESEARCH STUDY PATIENT: ICD-10-CM

## 2025-04-14 DIAGNOSIS — Z90.410 POST-PANCREATECTOMY DIABETES (H): Primary | ICD-10-CM

## 2025-04-14 DIAGNOSIS — E89.1 POST-PANCREATECTOMY DIABETES (H): Primary | ICD-10-CM

## 2025-04-14 DIAGNOSIS — E13.9 POST-PANCREATECTOMY DIABETES (H): Primary | ICD-10-CM

## 2025-04-14 LAB
ALBUMIN SERPL BCG-MCNC: 3.8 G/DL (ref 3.5–5.2)
ALP SERPL-CCNC: 206 U/L (ref 40–150)
ALT SERPL W P-5'-P-CCNC: 27 U/L (ref 0–50)
ANION GAP SERPL CALCULATED.3IONS-SCNC: 10 MMOL/L (ref 7–15)
AST SERPL W P-5'-P-CCNC: 25 U/L (ref 0–45)
BILIRUB SERPL-MCNC: 0.2 MG/DL
BUN SERPL-MCNC: 11.6 MG/DL (ref 8–23)
CALCIUM SERPL-MCNC: 8.9 MG/DL (ref 8.8–10.4)
CHLORIDE SERPL-SCNC: 107 MMOL/L (ref 98–107)
CREAT SERPL-MCNC: 0.76 MG/DL (ref 0.51–0.95)
CYSTATIN C (ROCHE): 0.8 MG/L (ref 0.6–1)
EGFRCR SERPLBLD CKD-EPI 2021: 90 ML/MIN/1.73M2
ERYTHROCYTE [DISTWIDTH] IN BLOOD BY AUTOMATED COUNT: 20.9 % (ref 10–15)
GFR/BSA.PRED SERPLBLD CYS-BASED-ARV: >90 ML/MIN/1.73M2
GLUCOSE SERPL-MCNC: 120 MG/DL (ref 70–99)
HCO3 SERPL-SCNC: 24 MMOL/L (ref 22–29)
HCT VFR BLD AUTO: 33.5 % (ref 35–47)
HGB BLD-MCNC: 10 G/DL (ref 11.7–15.7)
MCH RBC QN AUTO: 24.9 PG (ref 26.5–33)
MCHC RBC AUTO-ENTMCNC: 29.9 G/DL (ref 31.5–36.5)
MCV RBC AUTO: 84 FL (ref 78–100)
PLATELET # BLD AUTO: 449 10E3/UL (ref 150–450)
POTASSIUM SERPL-SCNC: 4.4 MMOL/L (ref 3.4–5.3)
PROT SERPL-MCNC: 6.5 G/DL (ref 6.4–8.3)
RBC # BLD AUTO: 4.01 10E6/UL (ref 3.8–5.2)
SODIUM SERPL-SCNC: 141 MMOL/L (ref 135–145)
WBC # BLD AUTO: 6.7 10E3/UL (ref 4–11)

## 2025-04-14 PROCEDURE — 85041 AUTOMATED RBC COUNT: CPT | Performed by: PEDIATRICS

## 2025-04-14 PROCEDURE — 250N000011 HC RX IP 250 OP 636: Performed by: PEDIATRICS

## 2025-04-14 PROCEDURE — S9542 HT INJ NOC PER DIEM: HCPCS | Mod: SH

## 2025-04-14 PROCEDURE — 36591 DRAW BLOOD OFF VENOUS DEVICE: CPT | Performed by: PEDIATRICS

## 2025-04-14 PROCEDURE — 510N000019 HC RESEARCH MMTT, PER HOUR

## 2025-04-14 PROCEDURE — 85014 HEMATOCRIT: CPT | Performed by: PEDIATRICS

## 2025-04-14 PROCEDURE — 84450 TRANSFERASE (AST) (SGOT): CPT | Performed by: PEDIATRICS

## 2025-04-14 PROCEDURE — 84155 ASSAY OF PROTEIN SERUM: CPT | Performed by: PEDIATRICS

## 2025-04-14 PROCEDURE — 510N000016 HC RESEARCH MEALS, PER MEAL

## 2025-04-14 PROCEDURE — 82610 CYSTATIN C: CPT | Performed by: PEDIATRICS

## 2025-04-14 PROCEDURE — 510N000009 HC RESEARCH FACILITY, PER 15 MIN

## 2025-04-14 PROCEDURE — S9379 HIT NOC PER DIEM: HCPCS

## 2025-04-14 PROCEDURE — 82725 ASSAY OF BLOOD FATTY ACIDS: CPT | Performed by: PEDIATRICS

## 2025-04-14 RX ORDER — HEPARIN SODIUM (PORCINE) LOCK FLUSH IV SOLN 100 UNIT/ML 100 UNIT/ML
5-10 SOLUTION INTRAVENOUS
Status: DISCONTINUED | OUTPATIENT
Start: 2025-04-14 | End: 2025-04-15 | Stop reason: HOSPADM

## 2025-04-14 RX ORDER — HEPARIN SODIUM,PORCINE 10 UNIT/ML
5-10 VIAL (ML) INTRAVENOUS
Status: DISCONTINUED | OUTPATIENT
Start: 2025-04-14 | End: 2025-04-15 | Stop reason: HOSPADM

## 2025-04-14 RX ORDER — HEPARIN SODIUM,PORCINE 10 UNIT/ML
5-10 VIAL (ML) INTRAVENOUS EVERY 24 HOURS
Status: DISCONTINUED | OUTPATIENT
Start: 2025-04-14 | End: 2025-04-15 | Stop reason: HOSPADM

## 2025-04-14 RX ADMIN — HEPARIN SODIUM (PORCINE) LOCK FLUSH IV SOLN 100 UNIT/ML 5 ML: 100 SOLUTION at 14:02

## 2025-04-14 NOTE — LETTER
2025       RE: Dinora Mcghee  [address] [facility] [address]     Dear Colleague,    Thank you for referring your patient, Dinora Mcghee, to the [facility] [place] at [facility]. Please see a copy of my visit note below.                        [facility]  Electrodiagnostic Laboratory                 Department of Neurology                                                                                                         Test Date:  2025    Patient: Dinora Loo : 1965 Physician: Sarabjit Avelar MD   Sex: Female AGE: 60 year Ref Phys: LIFT study   ID#: [id number]   Technician: Lakisha Sutton     History and Examination:  Limited NCS performed as part of the LIFT study. No formal report generated.     Sarabjit Avelar MD  Department of Neurology          Nerve Conduction Studies  Motor Sites      Latency Neg. Amp Neg. Amp Diff Segment Distance Velocity Neg. Dur Neg Area Diff Temperature Comment   Site (ms) Norm (mV) Norm (%)  cm m/s Norm (ms) (%) ( C)    Right Fibular (EDB) Motor   Ankle 3.7  < 6.0 3.5 -  Ankle-EDB 8   5.3  29.9    Bel Fibular Head 11.2 - 2.6 - -26 Bel Fibular Head-Ankle 36 48  > 38 6.0 -5 29.9    Pop Fossa 13.0 - 2.6 - 0 Pop Fossa-Bel Fibular Head 8 44  > 38 6.2 -1 30    Right Ulnar (ADM) Motor   Wrist 2.8  < 3.5 11.2  > 5.0  Wrist-ADM 8   4.6  30.4    Below Elbow 7.1 - 10.4 - -7 Below Elbow-Wrist 22 51  > 48 4.7 -4 30.4    Above Elbow 8.6 - 9.9 - -5 Above Elbow-Below Elbow 8 53  > 48 4.7 -4 30.5      Sensory Sites      Onset Lat Peak Lat Amp (O-P) Amp (P-P) Segment Distance Velocity Temperature Comment   Site ms (ms)  V Norm ( V)  cm m/s Norm ( C)    Right Radial Sensory   Forearm-Wrist 1.48 2.2 35  > 15 45 Forearm-Wrist 10 68 - 30.3    Right Sural Sensory   Calf 3.3 4.1 6  > 5 8 Calf-Lat Malleolus 14 42  > 38 30.6    Right Ulnar Sensory   Wrist-Dig V 2.4 3.2 10  > 8 17 Wrist-Dig V 12.5 52   > 48 30.2            Waveforms / Images:    Motor         Sensory                     Again, thank you for allowing me to participate in the care of your patient.      Sincerely,    Sarabjit Avelar MD

## 2025-04-14 NOTE — PROGRESS NOTES
[facility]  Electrodiagnostic Laboratory                 Department of Neurology                                                                                                         Test Date:  2025    Patient: Dinora Loo : 1965 Physician: Sarabjit Avelar MD   Sex: Female AGE: 60 year Ref Phys: LIFT study   ID#: [id number]   Technician: Lakisha Sutton     History and Examination:  Limited NCS performed as part of the LIFT study. No formal report generated.     Sarabjit Avelar MD  Department of Neurology          Nerve Conduction Studies  Motor Sites      Latency Neg. Amp Neg. Amp Diff Segment Distance Velocity Neg. Dur Neg Area Diff Temperature Comment   Site (ms) Norm (mV) Norm (%)  cm m/s Norm (ms) (%) ( C)    Right Fibular (EDB) Motor   Ankle 3.7  < 6.0 3.5 -  Ankle-EDB 8   5.3  29.9    Bel Fibular Head 11.2 - 2.6 - -26 Bel Fibular Head-Ankle 36 48  > 38 6.0 -5 29.9    Pop Fossa 13.0 - 2.6 - 0 Pop Fossa-Bel Fibular Head 8 44  > 38 6.2 -1 30    Right Ulnar (ADM) Motor   Wrist 2.8  < 3.5 11.2  > 5.0  Wrist-ADM 8   4.6  30.4    Below Elbow 7.1 - 10.4 - -7 Below Elbow-Wrist 22 51  > 48 4.7 -4 30.4    Above Elbow 8.6 - 9.9 - -5 Above Elbow-Below Elbow 8 53  > 48 4.7 -4 30.5      Sensory Sites      Onset Lat Peak Lat Amp (O-P) Amp (P-P) Segment Distance Velocity Temperature Comment   Site ms (ms)  V Norm ( V)  cm m/s Norm ( C)    Right Radial Sensory   Forearm-Wrist 1.48 2.2 35  > 15 45 Forearm-Wrist 10 68 - 30.3    Right Sural Sensory   Calf 3.3 4.1 6  > 5 8 Calf-Lat Malleolus 14 42  > 38 30.6    Right Ulnar Sensory   Wrist-Dig V 2.4 3.2 10  > 8 17 Wrist-Dig V 12.5 52  > 48 30.2            Waveforms / Images:    Motor         Sensory

## 2025-04-15 PROCEDURE — S9542 HT INJ NOC PER DIEM: HCPCS | Mod: SH

## 2025-04-15 PROCEDURE — S9379 HIT NOC PER DIEM: HCPCS

## 2025-04-16 ENCOUNTER — INFUSION THERAPY VISIT (OUTPATIENT)
Dept: INFUSION THERAPY | Facility: CLINIC | Age: 60
End: 2025-04-16
Attending: PEDIATRICS
Payer: COMMERCIAL

## 2025-04-16 VITALS
HEART RATE: 81 BPM | SYSTOLIC BLOOD PRESSURE: 127 MMHG | RESPIRATION RATE: 15 BRPM | OXYGEN SATURATION: 100 % | DIASTOLIC BLOOD PRESSURE: 84 MMHG | TEMPERATURE: 97.9 F

## 2025-04-16 DIAGNOSIS — D50.1 IRON DEFICIENCY ANEMIA DUE TO SIDEROPENIC DYSPHAGIA: Primary | ICD-10-CM

## 2025-04-16 DIAGNOSIS — Z94.83 S/P PANCREATIC ISLET CELL TRANSPLANTATION (H): ICD-10-CM

## 2025-04-16 LAB — NEFA SERPL-SCNC: 0.38 MMOL/L

## 2025-04-16 PROCEDURE — 250N000011 HC RX IP 250 OP 636: Performed by: PEDIATRICS

## 2025-04-16 PROCEDURE — 258N000003 HC RX IP 258 OP 636: Performed by: PEDIATRICS

## 2025-04-16 PROCEDURE — S9542 HT INJ NOC PER DIEM: HCPCS

## 2025-04-16 PROCEDURE — 96365 THER/PROPH/DIAG IV INF INIT: CPT

## 2025-04-16 RX ORDER — MEPERIDINE HYDROCHLORIDE 25 MG/ML
25 INJECTION INTRAMUSCULAR; INTRAVENOUS; SUBCUTANEOUS
OUTPATIENT
Start: 2025-04-18

## 2025-04-16 RX ORDER — METHYLPREDNISOLONE SODIUM SUCCINATE 40 MG/ML
40 INJECTION INTRAMUSCULAR; INTRAVENOUS
Start: 2025-04-18

## 2025-04-16 RX ORDER — EPINEPHRINE 1 MG/ML
0.3 INJECTION, SOLUTION, CONCENTRATE INTRAVENOUS EVERY 5 MIN PRN
OUTPATIENT
Start: 2025-04-16

## 2025-04-16 RX ORDER — ALBUTEROL SULFATE 90 UG/1
1-2 INHALANT RESPIRATORY (INHALATION)
Start: 2025-04-18

## 2025-04-16 RX ORDER — DIPHENHYDRAMINE HYDROCHLORIDE 50 MG/ML
50 INJECTION, SOLUTION INTRAMUSCULAR; INTRAVENOUS
Start: 2025-04-18

## 2025-04-16 RX ORDER — HEPARIN SODIUM (PORCINE) LOCK FLUSH IV SOLN 100 UNIT/ML 100 UNIT/ML
5 SOLUTION INTRAVENOUS
OUTPATIENT
Start: 2025-04-18

## 2025-04-16 RX ORDER — DIPHENHYDRAMINE HYDROCHLORIDE 50 MG/ML
25 INJECTION, SOLUTION INTRAMUSCULAR; INTRAVENOUS
Start: 2025-04-18

## 2025-04-16 RX ORDER — HEPARIN SODIUM,PORCINE 10 UNIT/ML
5-20 VIAL (ML) INTRAVENOUS DAILY PRN
OUTPATIENT
Start: 2025-04-18

## 2025-04-16 RX ORDER — MEPERIDINE HYDROCHLORIDE 25 MG/ML
25 INJECTION INTRAMUSCULAR; INTRAVENOUS; SUBCUTANEOUS
OUTPATIENT
Start: 2025-04-16

## 2025-04-16 RX ORDER — ALBUTEROL SULFATE 0.83 MG/ML
2.5 SOLUTION RESPIRATORY (INHALATION)
OUTPATIENT
Start: 2025-04-18

## 2025-04-16 RX ORDER — HEPARIN SODIUM,PORCINE 10 UNIT/ML
5-20 VIAL (ML) INTRAVENOUS DAILY PRN
Status: CANCELLED | OUTPATIENT
Start: 2025-04-16

## 2025-04-16 RX ORDER — HEPARIN SODIUM (PORCINE) LOCK FLUSH IV SOLN 100 UNIT/ML 100 UNIT/ML
5 SOLUTION INTRAVENOUS
OUTPATIENT
Start: 2025-04-16

## 2025-04-16 RX ORDER — ALBUTEROL SULFATE 0.83 MG/ML
2.5 SOLUTION RESPIRATORY (INHALATION)
OUTPATIENT
Start: 2025-04-16

## 2025-04-16 RX ORDER — DIPHENHYDRAMINE HYDROCHLORIDE 50 MG/ML
50 INJECTION, SOLUTION INTRAMUSCULAR; INTRAVENOUS
Start: 2025-04-16

## 2025-04-16 RX ORDER — HEPARIN SODIUM,PORCINE 10 UNIT/ML
5-20 VIAL (ML) INTRAVENOUS DAILY PRN
Status: DISCONTINUED | OUTPATIENT
Start: 2025-04-16 | End: 2025-04-16 | Stop reason: HOSPADM

## 2025-04-16 RX ORDER — DIPHENHYDRAMINE HYDROCHLORIDE 50 MG/ML
25 INJECTION, SOLUTION INTRAMUSCULAR; INTRAVENOUS
Start: 2025-04-16

## 2025-04-16 RX ORDER — ALBUTEROL SULFATE 90 UG/1
1-2 INHALANT RESPIRATORY (INHALATION)
Start: 2025-04-16

## 2025-04-16 RX ORDER — METHYLPREDNISOLONE SODIUM SUCCINATE 40 MG/ML
40 INJECTION INTRAMUSCULAR; INTRAVENOUS
Start: 2025-04-16

## 2025-04-16 RX ORDER — EPINEPHRINE 1 MG/ML
0.3 INJECTION, SOLUTION INTRAMUSCULAR; SUBCUTANEOUS EVERY 5 MIN PRN
OUTPATIENT
Start: 2025-04-18

## 2025-04-16 RX ADMIN — IRON SUCROSE 300 MG: 20 INJECTION, SOLUTION INTRAVENOUS at 14:40

## 2025-04-16 RX ADMIN — HEPARIN, PORCINE (PF) 10 UNIT/ML INTRAVENOUS SYRINGE 5 ML: at 16:09

## 2025-04-16 NOTE — PATIENT INSTRUCTIONS
Dear Dinora Mcghee    Thank you for choosing [facility] for your infusion.  The following information is a summary of our appointment as well as important reminders.      If you have any questions on your upcoming Specialty Infusion appointments, please call scheduling at [phone].  It was a pleasure taking care of you today.    Sincerely,    [facility]  [facility]  [address] [place] [address]  [address]  Phone:  [phone]

## 2025-04-16 NOTE — PROGRESS NOTES
Nursing Note  Dinora Mcghee [facility] for:   Chief Complaint   Patient presents with    Infusion     venofer     During today's [facility] appointment, orders from Dr. Melissa were completed.  Frequency: today is dose 1 of 3 total    Progress note:  Patient identification verified by name and date of birth.  Assessment completed.  Vitals recorded in Doc Flowsheets.  Patient was provided with education regarding medication/procedure and possible side effects.  Patient verbalized understanding.     present during visit today: Not Applicable.    Treatment Conditions: Non-applicable.    Infusion length and rate:  infusion given over approximately  1.5 hours    Labs: were not ordered for this appointment.    Vascular access: port accessed prior to appointment at [facility] by self. Pt uses port at home for IVF/meds.     Is the next appt scheduled? Yes. Sent message to scheduling for 3rd Venofer appointment.     Post Infusion Assessment:  Patient tolerated infusion without incident.     Discharge Plan:   Follow up plan of care with: ongoing infusions at [facility].  Discharge instructions were reviewed with patient.  Patient/representative verbalized understanding of discharge instructions and all questions answered.  Patient discharged from [facility] in stable condition.    Yoselyn Pappas RN    Administrations This Visit       iron sucrose (VENOFER) 300 mg in sodium chloride 0.9 % 290 mL intermittent infusion       Admin Date  04/16/2025 Action  $New Bag Dose  300 mg Rate  193.3 mL/hr Route  Intravenous Documented By  Yoselyn Pappas, RN

## 2025-04-17 ENCOUNTER — HOME INFUSION BILLING (OUTPATIENT)
Dept: HOME HEALTH SERVICES | Facility: HOME HEALTH | Age: 60
End: 2025-04-17
Payer: COMMERCIAL

## 2025-04-17 PROCEDURE — S9542 HT INJ NOC PER DIEM: HCPCS

## 2025-04-18 PROCEDURE — S9542 HT INJ NOC PER DIEM: HCPCS

## 2025-04-22 ENCOUNTER — LAB REQUISITION (OUTPATIENT)
Dept: LAB | Facility: CLINIC | Age: 60
End: 2025-04-22

## 2025-04-22 ENCOUNTER — HOSPITAL ENCOUNTER (OUTPATIENT)
Dept: RESEARCH | Facility: CLINIC | Age: 60
Discharge: HOME | End: 2025-04-22
Attending: PEDIATRICS | Admitting: PEDIATRICS
Payer: COMMERCIAL

## 2025-04-22 LAB
CREAT UR-MCNC: 92.3 MG/DL
MICROALBUMIN UR-MCNC: <12 MG/L
MICROALBUMIN/CREAT UR: NORMAL MG/G{CREAT}

## 2025-04-22 PROCEDURE — 82570 ASSAY OF URINE CREATININE: CPT | Performed by: PEDIATRICS

## 2025-04-22 PROCEDURE — 510N000009 HC RESEARCH FACILITY, PER 15 MIN

## 2025-04-22 PROCEDURE — 82542 COL CHROMOTOGRAPHY QUAL/QUAN: CPT | Performed by: PEDIATRICS

## 2025-04-22 PROCEDURE — 82043 UR ALBUMIN QUANTITATIVE: CPT | Performed by: PEDIATRICS

## 2025-04-22 PROCEDURE — 510N000017 HC CRU PATIENT CARE, PER 15 MIN

## 2025-04-22 PROCEDURE — 510N000014 HC RESEARCH GFR, PER GFR

## 2025-04-24 ENCOUNTER — INFUSION THERAPY VISIT (OUTPATIENT)
Dept: INFUSION THERAPY | Facility: CLINIC | Age: 60
End: 2025-04-24
Attending: PEDIATRICS
Payer: COMMERCIAL

## 2025-04-24 ENCOUNTER — HOME INFUSION (OUTPATIENT)
Dept: HOME HEALTH SERVICES | Facility: HOME HEALTH | Age: 60
End: 2025-04-24
Payer: COMMERCIAL

## 2025-04-24 ENCOUNTER — HOME INFUSION BILLING (OUTPATIENT)
Dept: HOME HEALTH SERVICES | Facility: HOME HEALTH | Age: 60
End: 2025-04-24
Payer: COMMERCIAL

## 2025-04-24 ENCOUNTER — TELEPHONE (OUTPATIENT)
Dept: ENDOCRINOLOGY | Facility: CLINIC | Age: 60
End: 2025-04-24

## 2025-04-24 VITALS
HEART RATE: 89 BPM | DIASTOLIC BLOOD PRESSURE: 79 MMHG | SYSTOLIC BLOOD PRESSURE: 125 MMHG | TEMPERATURE: 98 F | RESPIRATION RATE: 18 BRPM

## 2025-04-24 DIAGNOSIS — E13.9 POST-PANCREATECTOMY DIABETES (H): ICD-10-CM

## 2025-04-24 DIAGNOSIS — D50.1 IRON DEFICIENCY ANEMIA DUE TO SIDEROPENIC DYSPHAGIA: ICD-10-CM

## 2025-04-24 DIAGNOSIS — E89.1 POST-PANCREATECTOMY DIABETES (H): ICD-10-CM

## 2025-04-24 DIAGNOSIS — Z90.410 POST-PANCREATECTOMY DIABETES (H): ICD-10-CM

## 2025-04-24 DIAGNOSIS — E10.9 TYPE 1 DIABETES MELLITUS WITHOUT COMPLICATION (H): Primary | ICD-10-CM

## 2025-04-24 DIAGNOSIS — Z94.83 S/P PANCREATIC ISLET CELL TRANSPLANTATION (H): Primary | ICD-10-CM

## 2025-04-24 PROCEDURE — 250N000011 HC RX IP 250 OP 636: Performed by: PEDIATRICS

## 2025-04-24 PROCEDURE — A4215 STERILE NEEDLE: HCPCS

## 2025-04-24 PROCEDURE — 258N000003 HC RX IP 258 OP 636: Performed by: PEDIATRICS

## 2025-04-24 PROCEDURE — A4208 3 CC STERILE SYRINGE&NEEDLE: HCPCS

## 2025-04-24 PROCEDURE — A4245 ALCOHOL WIPES PER BOX: HCPCS

## 2025-04-24 RX ORDER — ALBUTEROL SULFATE 90 UG/1
1-2 INHALANT RESPIRATORY (INHALATION)
Start: 2025-04-26

## 2025-04-24 RX ORDER — BLOOD SUGAR DIAGNOSTIC
STRIP MISCELLANEOUS
Qty: 100 EACH | Refills: 11 | Status: SHIPPED | OUTPATIENT
Start: 2025-04-24

## 2025-04-24 RX ORDER — METHYLPREDNISOLONE SODIUM SUCCINATE 40 MG/ML
40 INJECTION INTRAMUSCULAR; INTRAVENOUS
Start: 2025-04-26

## 2025-04-24 RX ORDER — DIPHENHYDRAMINE HYDROCHLORIDE 50 MG/ML
25 INJECTION, SOLUTION INTRAMUSCULAR; INTRAVENOUS
Start: 2025-04-26

## 2025-04-24 RX ORDER — MEPERIDINE HYDROCHLORIDE 25 MG/ML
25 INJECTION INTRAMUSCULAR; INTRAVENOUS; SUBCUTANEOUS
OUTPATIENT
Start: 2025-04-26

## 2025-04-24 RX ORDER — DIPHENHYDRAMINE HYDROCHLORIDE 50 MG/ML
50 INJECTION, SOLUTION INTRAMUSCULAR; INTRAVENOUS
Start: 2025-04-26

## 2025-04-24 RX ORDER — HEPARIN SODIUM (PORCINE) LOCK FLUSH IV SOLN 100 UNIT/ML 100 UNIT/ML
5 SOLUTION INTRAVENOUS
Status: DISCONTINUED | OUTPATIENT
Start: 2025-04-24 | End: 2025-04-24 | Stop reason: HOSPADM

## 2025-04-24 RX ORDER — HEPARIN SODIUM (PORCINE) LOCK FLUSH IV SOLN 100 UNIT/ML 100 UNIT/ML
5 SOLUTION INTRAVENOUS
OUTPATIENT
Start: 2025-04-26

## 2025-04-24 RX ORDER — HEPARIN SODIUM,PORCINE 10 UNIT/ML
5-20 VIAL (ML) INTRAVENOUS DAILY PRN
OUTPATIENT
Start: 2025-04-26

## 2025-04-24 RX ORDER — ALBUTEROL SULFATE 0.83 MG/ML
2.5 SOLUTION RESPIRATORY (INHALATION)
OUTPATIENT
Start: 2025-04-26

## 2025-04-24 RX ORDER — EPINEPHRINE 1 MG/ML
0.3 INJECTION, SOLUTION INTRAMUSCULAR; SUBCUTANEOUS EVERY 5 MIN PRN
OUTPATIENT
Start: 2025-04-26

## 2025-04-24 RX ADMIN — IRON SUCROSE 300 MG: 20 INJECTION, SOLUTION INTRAVENOUS at 13:51

## 2025-04-24 ASSESSMENT — PAIN SCALES - GENERAL: PAINLEVEL_OUTOF10: MODERATE PAIN (4)

## 2025-04-24 NOTE — PROGRESS NOTES
Infusion Nursing Note:  Dinora Mcghee presents today for Venofer.    Patient seen by provider today: No   present during visit today: Not Applicable.    Note: Venofer infused over 90 minutes.      Intravenous Access:  Implanted Port.    Treatment Conditions:  Not Applicable.      Post Infusion Assessment:  Patient tolerated infusion without incident.  Blood return noted pre and post infusion.  Site patent and intact, free from redness, edema or discomfort.  No evidence of extravasations.  Access discontinued per protocol.       Discharge Plan:   Discharge instructions reviewed with: Patient.  Patient and/or family verbalized understanding of discharge instructions and all questions answered.  AVS to patient via PagerT.  Patient will return 5/7 for next appointment.   Patient discharged in stable condition accompanied by: self.  Departure Mode: Ambulatory.    Administrations This Visit       iron sucrose (VENOFER) 300 mg in sodium chloride 0.9 % 290 mL intermittent infusion       Admin Date  04/24/2025 Action  $New Bag Dose  300 mg Rate  193.3 mL/hr Route  Intravenous Documented By  Yin Cancino RN                  /87   Pulse 88   Temp 98  F (36.7  C) (Oral)   Resp 18     Yin Cancino RN

## 2025-04-24 NOTE — PATIENT INSTRUCTIONS
Dear Dinora Mcghee    Thank you for choosing [facility] ([facility] for your infusion.  The following information is a summary of our appointment as well as important reminders.      If you have any questions on your upcoming Specialty Infusion appointments, please call scheduling at [phone].  It was a pleasure taking care of you today.    Sincerely,    [facility]  [facility]  [address] [place] [address]  [address]  Phone:  [phone]

## 2025-04-26 PROCEDURE — S9542 HT INJ NOC PER DIEM: HCPCS

## 2025-04-27 PROCEDURE — S9542 HT INJ NOC PER DIEM: HCPCS

## 2025-04-27 ASSESSMENT — HEADACHE IMPACT TEST (HIT 6)
HOW OFTEN HAVE YOU FELT FED UP OR IRRITATED BECAUSE OF YOUR HEADACHES: SOMETIMES
WHEN YOU HAVE HEADACHES HOW OFTEN IS THE PAIN SEVERE: VERY OFTEN
HOW OFTEN DID HEADACHS LIMIT CONCENTRATION ON WORK OR DAILY ACTIVITY: SOMETIMES
HOW OFTEN DO HEADACHES LIMIT YOUR DAILY ACTIVITIES: SOMETIMES
HOW OFTEN HAVE YOU FELT TOO TIRED TO WORK BECAUSE OF YOUR HEADACHES: SOMETIMES
HIT6 TOTAL SCORE: 64
WHEN YOU HAVE A HEADACHE HOW OFTEN DO YOU WISH YOU COULD LIE DOWN: ALWAYS

## 2025-04-28 ENCOUNTER — OFFICE VISIT (OUTPATIENT)
Dept: NEUROLOGY | Facility: CLINIC | Age: 60
End: 2025-04-28
Payer: COMMERCIAL

## 2025-04-28 DIAGNOSIS — G43.709 CHRONIC MIGRAINE W/O AURA W/O STATUS MIGRAINOSUS, NOT INTRACTABLE: Primary | ICD-10-CM

## 2025-04-28 PROCEDURE — 64615 CHEMODENERV MUSC MIGRAINE: CPT | Performed by: NURSE PRACTITIONER

## 2025-04-28 PROCEDURE — S9542 HT INJ NOC PER DIEM: HCPCS

## 2025-04-28 NOTE — PROGRESS NOTES
"PROCEDURE NOTE:     [facility]  Botulinum Toxin Procedure     Headache Neurology     04/28/25    Procedure:  OnabotulinumtoxinA injections for chronic migraine  Indication:  Chronic migraine     Dinoar suffers from severe intractable headaches.  She was referred by for onabotulinumtoxinA injections for headache.  Risks, benefits, and alternatives were discussed.  All questions were answered and consent given.  She decided to proceed with the injections.      Headache history, from initial consult note: See Initial Headache Clinic visit on 8/3/2022 for details     Prior to initiation of botulinum toxin injections, Ms. Mcghee reported 13-15 headache days per month, with 3-4 severe headache days per month. Her headaches are quite disabling and often interfere with her ability to function normally.     Date of last injections:  01/30/25    Ms. Mcghee reports several minor and about 3 severe  migraine headaches in the last months before Botox    She has noticed a wearing off phenomenon prior to this round of botulinum toxin injections, lasting 2-3 weeks.     Botulinum toxin injections have improved their functioning: able to read better, walking and not in bed      She has attempted other migraine prophylactic treatments in the past, which have included:   Amitriptyline 30 mg   Topiramate  Sumatriptan   lyrica-\"brain fog\"  depakote -allergies  Compazine +benedryl  Promethazine IV three times per week for   scopalamine patch     She currently takes amitriptyline, promethazine, zolmitriptine for headache prevention.     Ms. Skinners pain was assessed prior to the procedure.  She rated her pain today as 2 out of 10.     Procedural Pause: Procedural pause was conducted to verify correct patient identity, procedure to be performed, correct side and site, correct patient position, and special requirements. Appropriate hand hygiene was utilized, and each injection site was prepped with alcohol wipe or Chloraprep swab.    "   Procedure Details: 200 units of onabotulinumtoxinA was diluted in 4 mL 0.9% normal saline. A total of 200 units of onabotulinumtoxinA were injected using 30 gauge 0.5 in needles into the muscles listed below. 0 units of onabotulinumtoxinA were wasted.         GOAL OF PROCEDURE:  The goal of this procedure is to decrease pain and enhance functional independence.     CONSENT:  The risks, benefits, and treatment options were discussed with the patient who agreed to proceed.     Written consent was obtained      EQUIPMENT USED:  Needles-30 gauge, 0.5 inches for injections  Four 1-ml tuberculin syringes for injections  One sodium chloride 10 ml vial preservative free  Alcohol swabs     SKIN PREPARATION:  Skin preparation was performed using an alcohol wipe.        AREA/MUSCLE INJECTED:  200 units of Botox  Right upper Trapezius (upper cervical) - 10, 10, 5 units of Botox at 3 site/s.   Left upper Trapezius (upper cervical) - 10, 10, 5 units of Botox at 3 site/s.      Right cervical paraspinals - 5 units of Botox at 2 site/s.   Left cervical paraspinals - 5 units of Botox at 2 site/s.      Left occipitalis - 5 units of Botox at 4 site/s+2.5  Right occipitalis -5 units of Botox at 4 site/s+2.5     Right Frontalis - 5 units of Botox at 2 site/s.  Left Frontalis - 5 units of Botox at 2 site/s.     Right Temporalis - 5 units of Botox at 5 site/s.  Left Temporalis - 5 units of Botox at 5 site/s.     Right  - 5 units of Botox at 1 site/s.              Left  - 5 units of Botox at 1 site/s.     Procerus - 5 units of Botox at 1 site/s.     RESPONSE TO PROCEDURE:  tolerated the procedure well and there were no immediate complications.  Patient was allowed to recover for an appropriate period of time and was discharged home in stable condition.     Left PICC -patient asked to take a look at her PICC site. Painful when bumps into something but otherwise no pain, no erythema, edema. 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 She will observed and goes to ED if any problems      FOLLOW UP:     Repeat Botox injections in 12 weeks        This procedure was performed under a hospital privileging agreement with NATALY Bonner, [facility]

## 2025-04-28 NOTE — LETTER
"4/28/2025       RE: Dinora Mcghee  [address] [facility] [address]     Dear Colleague,    Thank you for referring your patient, Dinora Mcghee, to the [facility] [place] at [facility]. Please see a copy of my visit note below.    PROCEDURE NOTE:     [facility]  Botulinum Toxin Procedure     Headache Neurology     04/28/25    Procedure:  OnabotulinumtoxinA injections for chronic migraine  Indication:  Chronic migraine     Dinora suffers from severe intractable headaches.  She was referred by for onabotulinumtoxinA injections for headache.  Risks, benefits, and alternatives were discussed.  All questions were answered and consent given.  She decided to proceed with the injections.      Headache history, from initial consult note: See Initial Headache Clinic visit on 8/3/2022 for details     Prior to initiation of botulinum toxin injections, Ms. Mcghee reported 13-15 headache days per month, with 3-4 severe headache days per month. Her headaches are quite disabling and often interfere with her ability to function normally.     Date of last injections:  01/30/25    Ms. Mcghee reports several minor and about 3 severe  migraine headaches in the last months before Botox    She has noticed a wearing off phenomenon prior to this round of botulinum toxin injections, lasting 2-3 weeks.     Botulinum toxin injections have improved their functioning: able to read better, walking and not in bed      She has attempted other migraine prophylactic treatments in the past, which have included:   Amitriptyline 30 mg   Topiramate  Sumatriptan   lyrica-\"brain fog\"  depakote -allergies  Compazine +benedryl  Promethazine IV three times per week for   scopalamine patch     She currently takes amitriptyline, promethazine, zolmitriptine for headache prevention.     Ms. Mcghee's pain was assessed prior to the procedure.  She rated her pain today as 2 out " of 10.     Procedural Pause: Procedural pause was conducted to verify correct patient identity, procedure to be performed, correct side and site, correct patient position, and special requirements. Appropriate hand hygiene was utilized, and each injection site was prepped with alcohol wipe or Chloraprep swab.      Procedure Details: 200 units of onabotulinumtoxinA was diluted in 4 mL 0.9% normal saline. A total of 200 units of onabotulinumtoxinA were injected using 30 gauge 0.5 in needles into the muscles listed below. 0 units of onabotulinumtoxinA were wasted.         GOAL OF PROCEDURE:  The goal of this procedure is to decrease pain and enhance functional independence.     CONSENT:  The risks, benefits, and treatment options were discussed with the patient who agreed to proceed.     Written consent was obtained      EQUIPMENT USED:  Needles-30 gauge, 0.5 inches for injections  Four 1-ml tuberculin syringes for injections  One sodium chloride 10 ml vial preservative free  Alcohol swabs     SKIN PREPARATION:  Skin preparation was performed using an alcohol wipe.        AREA/MUSCLE INJECTED:  200 units of Botox  Right upper Trapezius (upper cervical) - 10, 10, 5 units of Botox at 3 site/s.   Left upper Trapezius (upper cervical) - 10, 10, 5 units of Botox at 3 site/s.      Right cervical paraspinals - 5 units of Botox at 2 site/s.   Left cervical paraspinals - 5 units of Botox at 2 site/s.      Left occipitalis - 5 units of Botox at 4 site/s+2.5  Right occipitalis -5 units of Botox at 4 site/s+2.5     Right Frontalis - 5 units of Botox at 2 site/s.  Left Frontalis - 5 units of Botox at 2 site/s.     Right Temporalis - 5 units of Botox at 5 site/s.  Left Temporalis - 5 units of Botox at 5 site/s.     Right  - 5 units of Botox at 1 site/s.              Left  - 5 units of Botox at 1 site/s.     Procerus - 5 units of Botox at 1 site/s.     RESPONSE TO PROCEDURE:  tolerated the procedure well and there  were no immediate complications.  Patient was allowed to recover for an appropriate period of time and was discharged home in stable condition.     Left PICC -patient asked to take a look at her PICC site. Painful when bumps into something but otherwise no pain, no erythema, edema. 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 She will observed and goes to ED if any problems      FOLLOW UP:     Repeat Botox injections in 12 weeks        This procedure was performed under a hospital privileging agreement with NATALY Bonner, CNP  [facility]      Again, thank you for allowing me to participate in the care of your patient.      Sincerely,    NATALY Hoffman CNP

## 2025-04-29 ENCOUNTER — HOME INFUSION (OUTPATIENT)
Dept: HOME HEALTH SERVICES | Facility: HOME HEALTH | Age: 60
End: 2025-04-29
Payer: COMMERCIAL

## 2025-04-29 LAB
BSA: 1.89 M2
COEFF DETERMINATION: 0.99 %
IOHEXOL CL UR+SERPL-VRATE: 4.35 MG/DL
IOHEXOL CL UR+SERPL-VRATE: 4.35 MG/DL
IOHEXOL CL UR+SERPL-VRATE: 5.17 MG/DL
IOHEXOL CL UR+SERPL-VRATE: 5.17 MG/DL
IOHEXOL CL UR+SERPL-VRATE: 6.28 MG/DL
IOHEXOL CL UR+SERPL-VRATE: 6.28 MG/DL
IOHEXOL CL UR+SERPL-VRATE: 7.35 MG/DL
IOHEXOL CL UR+SERPL-VRATE: 7.35 MG/DL
IOHEXOL CL UR+SERPL-VRATE: 8.49 MG/DL
IOHEXOL CL UR+SERPL-VRATE: 85 /1.73 M2
IOHEXOL CL UR+SERPL-VRATE: 93 ML/MIN

## 2025-04-29 PROCEDURE — S9542 HT INJ NOC PER DIEM: HCPCS

## 2025-04-30 PROCEDURE — S9542 HT INJ NOC PER DIEM: HCPCS

## 2025-05-01 ENCOUNTER — HOME INFUSION BILLING (OUTPATIENT)
Dept: HOME HEALTH SERVICES | Facility: HOME HEALTH | Age: 60
End: 2025-05-01
Payer: COMMERCIAL

## 2025-05-01 PROCEDURE — A4215 STERILE NEEDLE: HCPCS

## 2025-05-01 PROCEDURE — S9542 HT INJ NOC PER DIEM: HCPCS

## 2025-05-01 PROCEDURE — A4208 3 CC STERILE SYRINGE&NEEDLE: HCPCS

## 2025-05-02 PROCEDURE — S9542 HT INJ NOC PER DIEM: HCPCS

## 2025-05-03 PROCEDURE — S9379 HIT NOC PER DIEM: HCPCS

## 2025-05-04 PROCEDURE — S9379 HIT NOC PER DIEM: HCPCS

## 2025-05-05 PROCEDURE — S9379 HIT NOC PER DIEM: HCPCS

## 2025-05-06 PROCEDURE — S9379 HIT NOC PER DIEM: HCPCS

## 2025-05-07 ENCOUNTER — LAB (OUTPATIENT)
Dept: LAB | Facility: CLINIC | Age: 60
End: 2025-05-07
Payer: COMMERCIAL

## 2025-05-07 ENCOUNTER — RESULTS FOLLOW-UP (OUTPATIENT)
Dept: INTERNAL MEDICINE | Facility: CLINIC | Age: 60
End: 2025-05-07

## 2025-05-07 ENCOUNTER — INFUSION THERAPY VISIT (OUTPATIENT)
Dept: INFUSION THERAPY | Facility: CLINIC | Age: 60
End: 2025-05-07
Attending: PEDIATRICS
Payer: COMMERCIAL

## 2025-05-07 ENCOUNTER — VIRTUAL VISIT (OUTPATIENT)
Dept: GASTROENTEROLOGY | Facility: CLINIC | Age: 60
End: 2025-05-07
Attending: INTERNAL MEDICINE
Payer: COMMERCIAL

## 2025-05-07 ENCOUNTER — TELEPHONE (OUTPATIENT)
Dept: PHARMACY | Facility: CLINIC | Age: 60
End: 2025-05-07

## 2025-05-07 VITALS
DIASTOLIC BLOOD PRESSURE: 74 MMHG | RESPIRATION RATE: 18 BRPM | TEMPERATURE: 98.1 F | SYSTOLIC BLOOD PRESSURE: 107 MMHG | HEART RATE: 65 BPM | OXYGEN SATURATION: 97 %

## 2025-05-07 DIAGNOSIS — E10.9 TYPE 1 DIABETES MELLITUS WITHOUT COMPLICATION (H): ICD-10-CM

## 2025-05-07 DIAGNOSIS — K21.9 GASTROESOPHAGEAL REFLUX DISEASE WITHOUT ESOPHAGITIS: Primary | ICD-10-CM

## 2025-05-07 DIAGNOSIS — R11.0 NAUSEA: ICD-10-CM

## 2025-05-07 DIAGNOSIS — Z94.83 S/P PANCREATIC ISLET CELL TRANSPLANTATION (H): ICD-10-CM

## 2025-05-07 DIAGNOSIS — Z90.49 S/P TOTAL COLECTOMY: ICD-10-CM

## 2025-05-07 DIAGNOSIS — Z13.6 SCREENING FOR CARDIOVASCULAR CONDITION: Primary | ICD-10-CM

## 2025-05-07 DIAGNOSIS — D50.1 IRON DEFICIENCY ANEMIA DUE TO SIDEROPENIC DYSPHAGIA: Primary | ICD-10-CM

## 2025-05-07 DIAGNOSIS — Z93.2 STATUS POST ILEOSTOMY (H): ICD-10-CM

## 2025-05-07 LAB
CHOLEST SERPL-MCNC: 168 MG/DL
FASTING STATUS PATIENT QL REPORTED: NO
HDLC SERPL-MCNC: 67 MG/DL
LDLC SERPL CALC-MCNC: 86 MG/DL
NONHDLC SERPL-MCNC: 101 MG/DL
TRIGL SERPL-MCNC: 75 MG/DL

## 2025-05-07 PROCEDURE — 96365 THER/PROPH/DIAG IV INF INIT: CPT

## 2025-05-07 PROCEDURE — 258N000003 HC RX IP 258 OP 636: Performed by: PEDIATRICS

## 2025-05-07 PROCEDURE — 1125F AMNT PAIN NOTED PAIN PRSNT: CPT | Mod: 95 | Performed by: INTERNAL MEDICINE

## 2025-05-07 PROCEDURE — 98007 SYNCH AUDIO-VIDEO EST HI 40: CPT | Mod: 24 | Performed by: INTERNAL MEDICINE

## 2025-05-07 PROCEDURE — 80061 LIPID PANEL: CPT | Performed by: PATHOLOGY

## 2025-05-07 PROCEDURE — 96366 THER/PROPH/DIAG IV INF ADDON: CPT

## 2025-05-07 PROCEDURE — 36415 COLL VENOUS BLD VENIPUNCTURE: CPT | Performed by: PATHOLOGY

## 2025-05-07 PROCEDURE — 250N000011 HC RX IP 250 OP 636: Performed by: PEDIATRICS

## 2025-05-07 RX ORDER — HEPARIN SODIUM,PORCINE 10 UNIT/ML
5-20 VIAL (ML) INTRAVENOUS DAILY PRN
Status: CANCELLED | OUTPATIENT
Start: 2025-05-08

## 2025-05-07 RX ORDER — ALBUTEROL SULFATE 90 UG/1
1-2 INHALANT RESPIRATORY (INHALATION)
Status: CANCELLED
Start: 2025-05-08

## 2025-05-07 RX ORDER — OMEPRAZOLE 40 MG/1
40 CAPSULE, DELAYED RELEASE ORAL DAILY
Qty: 90 CAPSULE | Refills: 3 | Status: SHIPPED | OUTPATIENT
Start: 2025-05-07

## 2025-05-07 RX ORDER — DIPHENHYDRAMINE HYDROCHLORIDE 50 MG/ML
50 INJECTION, SOLUTION INTRAMUSCULAR; INTRAVENOUS
Status: CANCELLED
Start: 2025-05-08

## 2025-05-07 RX ORDER — ALBUTEROL SULFATE 0.83 MG/ML
2.5 SOLUTION RESPIRATORY (INHALATION)
Status: CANCELLED | OUTPATIENT
Start: 2025-05-08

## 2025-05-07 RX ORDER — METHYLPREDNISOLONE SODIUM SUCCINATE 40 MG/ML
40 INJECTION INTRAMUSCULAR; INTRAVENOUS
Status: CANCELLED
Start: 2025-05-08

## 2025-05-07 RX ORDER — DIPHENHYDRAMINE HYDROCHLORIDE 50 MG/ML
25 INJECTION, SOLUTION INTRAMUSCULAR; INTRAVENOUS
Status: CANCELLED
Start: 2025-05-08

## 2025-05-07 RX ORDER — HEPARIN SODIUM (PORCINE) LOCK FLUSH IV SOLN 100 UNIT/ML 100 UNIT/ML
5 SOLUTION INTRAVENOUS
Status: CANCELLED | OUTPATIENT
Start: 2025-05-08

## 2025-05-07 RX ORDER — EPINEPHRINE 1 MG/ML
0.3 INJECTION, SOLUTION INTRAMUSCULAR; SUBCUTANEOUS EVERY 5 MIN PRN
Status: CANCELLED | OUTPATIENT
Start: 2025-05-08

## 2025-05-07 RX ORDER — MEPERIDINE HYDROCHLORIDE 25 MG/ML
25 INJECTION INTRAMUSCULAR; INTRAVENOUS; SUBCUTANEOUS
Status: CANCELLED | OUTPATIENT
Start: 2025-05-08

## 2025-05-07 RX ADMIN — IRON SUCROSE 300 MG: 20 INJECTION, SOLUTION INTRAVENOUS at 14:45

## 2025-05-07 NOTE — LETTER
5/7/2025      Dinora Mcghee  [address] [facility] [address]      Dear Colleague,    Thank you for referring your patient, Dinora Mcghee, to the [facility] [place]. Please see a copy of my visit note below.    Virtual Visit Details    Type of service:  Video Visit   Video Start Time: 10:29 a.m.  Video End Time: 11:21 a.m    Originating Location (pt. Location): Home  Distant Location (provider location):  On-site  Platform used for Video Visit: Emeli    -------------------------------------  GI CLINIC VISIT    CC: follow-up       ASSESSMENT/PLAN:  (K21.9) Gastroesophageal reflux disease without esophagitis  (primary encounter diagnosis)  (R11.0) Nausea  (Z90.49) S/P total colectomy  (Z93.2) Status post ileostomy (H)     Nausea after bending over - would want to aggressively treat any component of GERD/bringing up of refluxant. Overall she feels reflux is worse. Will switch to more potent PPI (was originally on pantoprazole as it was available in liquid and IV formulations when she could not tolerate oral pills). Also consider Gaviscon with alginate for breakthrough symptoms as well as minimizing activity that requires bending over. Would recommend patient monitor for other associated symptoms - doesn't seem to be experiencing vertigo, HA, etc. with bending over, but if occurs would consider other non-GI drivers (?hypotension, ?BPPV, ?migraine variant).     Total amount of ostomy output is within expected range and consistency is generally good. Some episodes of more liquid stools. Can try and address with methylcellulose. Her bigger concern is that much of the emptying is at night and she may awaken due to pressure of a full ostomy bag. She is not having nighttime explosions or leaking - reports she does have a larger volume nighttime ostomy bag. It is expected that one has migrating motor complexes while sleeping to empty the bowels and noted that position while lying  down seems to aid in emptying of her stoma. Could trial caffeine during the day to get a bit more output before bed or even some schedule lying down time. Agree with prior recs to stay up later in the evening and empty bag right before lying down. Could try scheduled awakening after a full sleep cycle (3 or so hours) then empty ostomy bag, then go back to bed - wonder if this would limit the nights of having 2 or more awakenings, but will certainly defer to those managing her insomnia.     - stop pantoprazole  - start omeprazole 40 mg each morning, monitor response, we can increase to twice daily if needed to help address any component of GERD to symptoms after bending over  - can try Gaviscon with alginate for breakthrough heartburn  - try timing caffeine with a period of lying down during the day to promote antegrade bowel function and ostomy emptying to see if less emptying needs to happen at night  - ensure you have the largest nighttime ostomy bag you can get   - can consider scheduled waking after a complete sleep cycle - get up out of bed (as insomnia team suggested if you are awake at night), empty ostomy bag, then trial a second sleep cycle  - ok to stay up later and empty ostomy bag right before bed  - try Citrucel/methylcellulose 1 tablespoon in water a day to help thicken output more, monitor response   - continue to avoid artificial sugars which can lead to loose bowel output  - ok for antiemetics as needed  - follow-up in GI clinic in 3-6 months      It was a pleasure to participate in the care of this patient; please contact us with any further questions.  A total of 52 video face-to-face minutes was spent with this patient. An additional 30 minutes was spent on the date of the encounter doing chart review, documentation, care coordination, and further activities as noted above.    Vijaya Genao MD  Associate Professor of Medicine  Division of Gastroenterology, Hepatology and  Nutrition  [facility]    ---------------------------------------------------------------------------------------------------  HPI:    Ms. 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 Her last visit with our clinic was 11/20/2024.     Since her last visit, she has been doing ok. She feels many things are better, but expresses that she is fatigued physically but also, understandably, tired of having to work so hard on so many medical issues. She is looking forward to her daughter's weeding in early July and being able to be there. She has played golf twice this year, but it was tiring. She would like to work again at some point, but is struggling to see how that would be possible given her many medical needs.     She continues to follow with neurology for migraines and is undergoing Botox injections.   Dr. Melissa of endocrinology identified low iron levels and placed Ms. Loo on iron infusions; she is hopeful this will improve her energy. She just started on an insulin pump which has been going ok for the one day she's had it. She is also planned for a carotid US to evaluate potential plaque as she states changes were seen in her vessels as part of a study she's enrolled in.     She continues to have nighttime awakenings - sometimes she awakens from pain, sometimes from the heaviness of a full ostomy bag, sometimes from something else - only infrequently  does she sleep through the night. She is bothered by this and did undergo insomnia CBT. She felt that there was little they could offer in her particular circumstance, though she was told if she is awake/alert at night, she should just get out of bed and do something so she's been focusing on art or knitting when this happens       Please see below for discussion of her GI issues.          Gastroparesis, nausea and vomiting  Venting G-tube -  removed  Tube feeds via J-tube - removed    Taken/summarized from my earlier documentation:  Initially diagnosed years ago with 2-hour study gastric emptying study at [facility]. Repeat testing here with 4-hour GES on 6/27/2016 with 49% remaining at 4 hours (?on pain meds at the time).  This was addressed with prior GJ tube in fall 2016, NG tube used for venting. Ultimately, after TPIAT, she was able to wean off of TFs and return to a regular diet.  Patient did have some persisting nausea and vomiting which she treated over the years with various medications including scopolamine patch, prochlorperazine, and promethazine. For GP in the past she had no response to metoclopramide. She has not been able to try erythromycin or azithromycin due to anaphylactic event with prior azithromycin use (in ED in 2008) She has also been seen by neurology and psychology to assist with insomnia and with migraines (and any contribution they may have to nausea).       Unfortunately, things acutely worsened in November 2020.  An ED evaluation at that time was unremarkable and CT only revealed moderate stool burden, nonspecific fluid in small intestine.  Theses episodes continued to recur.       Bowel regimen was adjusted to ensure adequate output. Dietary changes were made (freq, small meals to liquid-only) and support with antiemetics. Initially these dietary changes seemed to help, but over time the response waned. Another course of rifaximin (previously successful at addressing bloating and  stool issues) was not helpful for her pain, bloating/fullness, and n/v. Amitriptyline was increased to 60 mg nightly with no change in discomfort.    From winter 2020 to May/Saba she lost about 16 lbs (from 156-->140 lbs). She continued to experience more pronounced post-prandial pain and fullness and had vomiting of food after eating. Work-up for alternate causes (with SBFT, CTE, CTA) was normal.       A GJ was placed in Sept 2021, but this was replaced a few times due to J-tube coiling in stomach as well as G-tube clogging/malfunction. On 10/19/21 she had placement of a weighted GJ but continued to struggle with discomfort at insertion site, clogging/venting issues. Ultimately a direct jejunostomy was done and after struggling with this for awhile it was removed. She worked hard to return to an oral diet.      By summer 2022, Ms. Loo had only a G-tube for venting.  At that time she was tolerating one small plate/1 cup/1 bowl of food without vomiting, 70% of the time. She would have 5-6 of these servings a day (~7202-7512 Kcal). Ms. Loo also had regular protein supplement drinks and hydration drinks. Her weight stabilized around 140 lbs. For nausea, she continued on her chronic scopolamine patch as well as prochlorperazine and promethazine about every 6-8 hours.       Later in 2022 she had progressive decline in her ability to tolerate oral intake and continued to lose weight. She was initiated on domperidone by Dr. Mandeep Park, but did not have response even with up-titration of dosing. Small bowel feeds were discussed and her G (KISHORE-KEY) was replaced with a G-J a couple times, but had issues with J-tube extension coiling. Ultimately on 12/30/22, Dr. Bauer placed a direct J in the OR; extensive BAUDILIO and a resection of 15 cm of SB with dense adhesions was also done. TFs were started and continued in earnest from that point on.      In Jan 2023, unfortunately a perforation occurred during G and J tube  exchange. She went to the OR that day. Jejunal perforation was repaired with bowel resection and additional BAUDILIO was performed. She was hospitalized thereafter and TFs were restarted.      Through early 2023, she felt better on just jejunal feeds with reduction in emesis, though still experienced nausea  for which she took: 1 scopolamine patch daily, IV promethazine once daily with J-tube promethazine at night, and compazine 10 mg - once daily. Her TFs were running at 45 mL for 18 hrs a day with oral intake is just bites of food for pleasure. She continued to vent her G-tube frequently - mostly seeing clear, colored liquid - no TFs. She continued on Relizorb digestive enzyme cartridge and did an IVF bolus via port each day.     In fall 2023,  Ms. Loo reported an ED presentation due to symptoms concerning for SBO, though imaging was unremarkable. She stated she had two hours of diarrhea after leaving the ED and felt markedly better. Around that time, she was vomiting about 5 times a week. This improved with her doing 24/7 venting. In addition to her chronic antiemetics, she started buprenorphine patch and medical MJ which were helpful. She continued on J-tube feeds (for all nutritional needs, was not tolerating things by mouth).       By early 2024, nausea and emesis have continued to impact any oral intake. Jejunal feeds continued (see constipation below) and symptoms worsened when her bowels were backed up (see constipation below).      In March 2024, emesis was a bit better though persisting. She had nausea each day, though vomiting was only about 3 days a week.    For TFs she was getting in 4 cans/day, 1500 kcal a day, running at 90 mL/hr for 11 hours, eating a small amount. She is worried she is losing weight again. At that visit we discussed altering TF goals with [facility] team.       In June 2024, Ms. Loo had further increased TFs up to 120 mL/hr for 10-12 hours a day. She was tolerating some  bites of food and on a good day a small bowl of ice cream or Cream of Wheat. There are days when oral intake does not go as well and she recalls an episode where she vomited yogurt and blueberries that she'd had 7 hours earlier. Weight was stable, but not gaining as she'd like. She did have some G-tube issues in July 2024 which required management by our advanced endoscopy team. The G-tube was out for a short time, though a new one was soon after placed due to need for chronic venting. She was venting most of the day (but G-tube is closed for an hour or so after taking pills). This continued to help address her bloating.       In Sept 2024, she maintained a stable weight around 140-150 lbs. Her TF formula was switched to [employer] which she tolerated better. Oral intake was around 300 calories/day, generally having something liquid/soft in the evening. TPN decreased down to 3 days a week. She occasionally vomited up what she ate hours later. If she does not vomit, she will experience marked bloating. She continued with IV promethazine and compazine suppositories as well as with scopolamine patch. The tablet form of compazine sometimes worked for her when she was out and about. Dr. Elmore at [facility] (whom she saw for a second opinion regarding colectomy, see below) did start her on meclizine. Ms. Loo did not find it useful and reported significant drowsiness . She had more regularly been taking benadryl for tape allergy issues and had found it useful for nausea at times.   Emend was also ordered by Dr Elmore, but declined by her insurance company.       Ultimately in Oct 2024,  Ms. Loo had a total abdominal colectomy w/ ileorectal anastomosis and diverting loop ileostomy 10/11/2024 with Dr. Branch. Her new J-tube site (had to be redone during the surgery with extensive BAUDILIO and SB resection) had ongoing issues with leaking and it did fall out. Decision was made not to replace it.She had much  improvement in her GP symptoms and ability to eat after colectomy and loop ileostomy.  She was eating 1-1.5 cups of various foods three times a day with TPN supplementation 3 days a week. She continued to vent via her G-tube if having issues and continued promethazine (usually 2 times a day). She also used prn LR for hydration.     Today, Ms. Loo has since had her venting G-tube removed (12/2024) as it was no longer needed. She has stopped her TPN altogether. She is exclusively getting nutrition via oral diet and is pleased to report she can find foods on pretty much any menu that she can tolerated. She is still using LR about every other day or so. She does try to focus on adequate oral hydration and drinks electrolyte drinks. She does use cannabis occasionally which helps with appetite as well as nausea and pain. Her weight has increased and she is up to a size 8 from a size 0 (pre-op when not tolerating nutrition).    She is noting more nausea lately - which is seemingly drive by GERD and post-nasal drip (see GERD below). She does not need refills on antiemetics today.           Constipation/irregular bowel movements/bloating    Taken/summarized from my prior documentation:  Patient reported history of ongoing constipation for over 20 years after having her first child.  She had tried multiple interventions including regular bowel cleanouts, MiraLAX, senna, milk of magnesium, Linzess, and Amitiza for which she was on when she first came [facility].  Note, patient has been on pancreatic enzyme replacement at times as well.    Amitiza had worked for awhile then lost effectiveness. She was trialed on Trulance but reported frequent loose stools in the initial days (including nocturnal stooling and incontinence) which prompted her to stop. Trial of rifaximin in the past with with improvement in bloating and stool consistency though, as stated above, a re-trial for her pain, n/v, and fullness was not  "successful.    Motegrity was then started with some success, though noted HAs. We discussed a possible switch in her regimen to address this, though she was most comfortable with continuing Motegrity and monitoring her symptoms.  Previously discussed combining daily constipation medications; noted patient reports cost for Amitiza was quite a lot  (running $1000+/month).     By 2022, she was on Motegrity daily as well as Miralax 1 capful BID, 4 senna a day, 1000 mg Mag citrate daily, and stool softeners. With this she would have no BMs for 3-4 days (though has gone anywhere from 1-8 days), then have a day of emptying where she has multiple BMs over a day. These were often \"enormous\" and she felt empty when complete. Noted her bowel pattern is complicated by her need for antiemetics. When she is \"backed up\" she will experience early satiety.       By late 2022, she felt BMs were improved with restart of TFs. She stopped Motegrity/prucalopride around 12/30/2022. She was taking only 2.5 mL of liquid senna BID (total 8.8 mg daily).  With this Ms. Loo, was moving her bowels 3-4 days out of a week. On the days she moved her bowels it was typically more than once (2-3 times). Stool consistency was soft, \"ribbon-like\" stools. She denied straining. When asked about HAs she did feel these were better - unclear if this is because she went off Motegrity or if due to treatment of migraines  (received Botox injections.)     In fall 2023, she's restarted Motegrity, Senna twice daily (5 mL) with Miralax prn. She continued on amitriptyline (discomfort). With this she had a BM 5 out of 7 days , nearly always in the morning. She may have a few more  BMs later in the day, though she's done by early afternoon. Stools were often formed, smooth, and \"sticky\", occasionally loose.  She continued on Relizorb.     In early 2024,  Ms. Loo had increasing nausea, abdominal pain, and decreased stool output. Please see [place] messages and " telephone encounters for further details. Acute CT imaging did not reveal an SBO or other pathology. Home bowel regimen was not particularly successful. She ended up getting a soapsuds enema at her local ED with results and some improved symptoms. We pursued a therapeutic gastrograffin enema and aggressive bowel regimen to ensure colonic emptying to address worsening pain, discomfort, and nausea.  We then adjusted her bowel regimen with plan for periodic GGE and consideration of adding Movantik (to be discussed with pain team as well).      In March 2024, she continued with pelvic floor PT and was feeling a little more in sync with biofeedback. She continued home PT exercises and noted some improvement with bladder control too. Bowel pattern was: BMs on 5/7 days, about 4 of the 7 days had good amount of stool output. She has been doing enemas about every 2-3 days (just had two days without enemas, then did one today with very large output.). Stools are all loose.     In June 2024, Ms. Loo continued on Motegrity and had been started Movantik 60 mg through pain clinic. She continued on sennosides 8.8 mg BID and was doing pink lady enemas about 3-4 times a week choosing to administer one when she feel uncomfortable. Afterwards she will pass a palm-size stool - ice cream consistency with a lot of pink water. She passes a lot of flatus as well.  She has some relief of discomfort after evacuating and passing this gas, though she doesn't feel cramping and bloating improve totally.        She was able to see Dr. Branch of colorectal surgery for discussion of potential surgical options. Total abdominal colectomy with likely ileostomy (known pelvic floor dysfunction) was reviewed though possible ileorectal anastomosis diverting loop ileostomy was discussed.  She was able to be seen at [facility] by Dr. Elmore on 7/30/24 for a second opinion. A work-up was undertaken for autoimmune dysmotility which was  "reportedly normal. Ibsrela was added to her regimen and a restart of Trulance was attempted as well (though insurance did not cove the medication so she did not start it). Colectomy was recommended at [facility]. She expressed that the [place] team felt that many of her upper GI symptoms were driven by poor lower GI dysmotility and may improve with colectomy. She states she was told that gastroparesis is not the primary issue and this resonated with her.     As noted above she had a total abdominal colectomy with loop ileostomy in Oct 2024 and post-operatively was able to go off of all bowel meds. As noted above, she was able to return to an oral diet and is off of all TFs and TPN (though still gets IVF infusions as needed).      Today, Ms. Loo shares that she is still \"grossed out\" at having to have an ostomy, but is not interested in a reversal noting her JONES recently had one and is struggling with fecal incontinence.      She is able to vent her ostomy well and is having less issues with \"explosions\" of her ostomy. She is bothered that her ostomy really seems to fill at night. She feels some of this is positional given angle of her stoma, but she doesn't empty much after napping. Her output at night is 6-7 cups at most, with less than half that amount coming out during the day. She does report frequent daytime gas in the ostomy bag which she vents without issue. Only infrequently does she sleep through the night. She often awakens - sometimes from pain, sometimes from heaviness of a full ostomy, sometimes from something else - and, therefore, feels unrested by the time morning arrives. She is napping during the day. The ostomy consistency is generally \"applesauce\" and she denies issues with clogging/pancaking. Sometimes her output is more liquid-y. She has done dietary measures to increase fiber for bulking. Simethicone has helped a bit with gas. Ostomy team recommended an increase in Creon, though " "she had not noted any steatorrhea (she is definitely aware of steatorrhea if she misses a Creon dose). She is doing well on this dose.          GERD, Dysphagia   Summarized/taken from prior documentation  Patient experiences GERD as substernal and throat burning with nocturnal relaxant causing choking. She reports a prior Schatzki's ring requiring previous dilation. Manometry in 2015 was normal, and pH impedence at that time had a DeM[place]ter of 24 with positive symptoms association. Patient has treated GERD symptoms with some combination of BID PPI, Carafate, H2 blocker, and Tums in the past. She'd had issues with access to liquid PPI due to insurance and liquid famotidine had not be sufficient for symptoms. Omeprazole by mouth seemed to never pass the stomach/get absorbed and would come out the G-tube when vented even when clamped for an hour or so (clamping longer led to more severe pain and symptoms). IV pantoprazole was ultimately started (issues digesting the pill).      She'd transitioned to oral pantoprazole post-op and continued since her discharge.      At her last visit, GERD was worse in the evening, but if she stopped eating at a certain time this was able to be managed.    Today, she describes issues with \"nausea\". She feels as if this is driven by post-nasal drip running down her throat/esophagus and she reports this allergy season (and this week) have been particularly bad for her. She also reports much nausea after activity that requires her to bend over (tying shoe, gardening) - it doesn't feel exactly like bringing up of refluxant but she is noticing worsening reflux in general. She also feels the sensation hangs around for awhile after bending over. She is focusing on smaller, more frequent meals which may help. She is still using scopolamine patch too.        Pain:   In regards to pain she had followed initially in our pain clinic for management of bulging disk as well as chronic abdominal pain " "for which she previously had local injections. Ms. Loo shares that much of her abdominal pain is a lot better after BAUDILIO and her first SB resection.  She was off of all pain medications except Tylenol and methycarbomal (for back pain) until her surgery and hospitalization for management of her perforation. She's continued to have pain at both her G and J-tube site.      She followed with [facility] for some time.     Has had a variety of pain locations over the years - G-tube and J-tube sites, intermittent pelvic pain described like \"labor pain\", PICC line pain, post-op incisional pain, stoma pain.    Today, she reports some pain just above the stoma, around her lowest rib that is intermittent and colicky. No noted stomal changes with this pain. She reports her pain provider has moved and she's wondering if she should establish with another provider or not. Pain medications primarily include amitriptyline, methocarbamol, marinol, etc.. Is not currently needing opioid pain meds. She will discuss with her PCP at upcoming appointment; we do have a pain clinic in the  system.         PROBLEM LIST  Patient Active Problem List    Diagnosis Date Noted     Dislodged jejunostomy tube 11/18/2024     Priority: Medium     Constipation 07/23/2024     Priority: Medium     Right upper quadrant abdominal pain 07/23/2024     Priority: Medium     S/P pancreatic islet cell transplantation (H) 07/23/2024     Priority: Medium     Bacteremia 11/10/2023     Priority: Medium     Abdominal pain, unspecified abdominal location 11/09/2023     Priority: Medium     Cholangitis (H) 06/07/2023     Priority: Medium     On total parenteral nutrition (TPN) 06/04/2023     Priority: Medium     Fever, unspecified fever cause 06/04/2023     Priority: Medium     Chronic pancreatitis, unspecified pancreatitis type (H) 06/04/2023     Priority: Medium     History of food intolerance 05/09/2023     Priority: Medium     Malnutrition, unspecified " type 05/09/2023     Priority: Medium     Nausea and vomiting, unspecified vomiting type 05/09/2023     Priority: Medium     Major depression, single episode 03/06/2023     Priority: Medium     Acute postoperative abdominal pain 01/31/2023     Priority: Medium     Feeding intolerance 12/30/2022     Priority: Medium     Myofascial pain 10/14/2022     Priority: Medium     Added automatically from request for surgery [id number]       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  pancreas and spleen removed, islet cells transplanted into liver    3 classes of vaccines needed .    1. Pneumococcal vaccines are as follows:       PCV 13 - one time dose (was given 2017)       PPSV23 - (given 12/1/16); repeat q 5 years      ROLE of PCV 20???    2. Meningococcal vaccines     Menactra - (last given 12/1/16) -  repeat q 5 yrs   NOTE: need to wait 4 wks after PCV 13 has been given.   OR - give Menveo instead as it can be given same time as PCV 13    AND   Bexcero - (got 12/9/16) - does not need to be repeated.     3. The Hib vaccine - (got 12/9/16) - does not need to be repeated.       Poisoning by drug 05/09/2022     Priority: Medium     Formatting of this note might be different from the original.  Per Care Everywhere review:  All oral, IV and injectable steroids are contraindicated (unless in life threatening situations) in Islet Auto transplant recipients. They can cause irreversible loss of islet cell function. Please contact patient's transplant care coordinator ADI Gaffney RN at [phone]/pager [phone] and/or endocrinologist prior to administration.       Type 1 diabetes mellitus without complication (H) 05/09/2022     Priority: Medium     Formatting of this note might be different from the original.  ACTUALLY - DM 3c - pathogenic diabetes as no pancreas.  (pancreas and spleen removed, islet cells transplanted into liver)    Seeing ENDO  at [facility] - Dr Erum Melissa       Intra-abdominal abscess (H) 12/03/2021     Priority: Medium     Postprocedural intraabdominal abscess (H) 12/03/2021     Priority: Medium     Gastrostomy tube obstruction (H) 11/27/2021     Priority: Medium     Abdominal pain, generalized 09/18/2021     Priority: Medium     Adult failure to thrive 08/16/2021     Priority: Medium     Added automatically from request for surgery [id number]       Hypoglycemia 08/05/2021     Priority: Medium     Iron deficiency anemia 03/22/2019     Priority: Medium     Headache, chronic migraine without aura 09/18/2018     Priority: Medium     Chronic pain syndrome 09/18/2018     Priority: Medium     S/P hernia repair 08/23/2018     Priority: Medium     Incisional hernia, without obstruction or gangrene 05/20/2018     Priority: Medium     Adhesive capsulitis of shoulder, unspecified laterality 11/14/2017     Priority: Medium     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  Excess IgG4  [facility]       Gastroparesis      Priority: Medium     Pancreatic insufficiency 01/17/2017     Priority: Medium     Diabetes insipidus 01/05/2017     Priority: Medium     Formatting of this note might be different from the original.  Diabetes Insipidus (DI)  Per external records.  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  Depression NOS       Other type of intractable migraine      Priority: Medium      Diagnosis updated by automated process. Provider to review and confirm.       GERD (gastroesophageal reflux disease) 12/01/2010     Priority: Medium     Lumbago 04/18/2005     Priority: Medium     History of L5-S1 degenerative disk disease.         Sensorineural hearing loss 01/10/2005     Priority: Medium     Problem list name updated by automated process. Provider to review       Vertiginous syndrome and labyrinthine disorder 01/10/2005     Priority: Medium     Problem list name updated by automated process. Provider to review  IMO Regulatory Load OCT 2020       Sprain and strain of other specified sites of hip and thigh 12/09/2002     Priority: Medium     Chondromalacia of patella 12/09/2002     Priority: Medium     Need for prophylactic immunotherapy      Priority: Medium     trees, grass, srw, dust mites, cat       Allergic rhinitis due to other allergen 12/21/2001     Priority: Medium     Hypothyroidism      Priority: Medium     Problem list name updated by automated process. Provider to review         PERTINENT MEDICATIONS:  Current Outpatient Medications   Medication Sig Dispense Refill     acetaminophen (TYLENOL) 325 MG/10.15ML solution 20.3 mLs (650 mg) by Per J Tube route every 6 hours as needed for mild pain or fever 473 mL 4     amitriptyline (ELAVIL) 10 MG tablet Take one 10mg tablet and one 50mg tablet together for a total daily dose of 60mg. 90 tablet 3     amitriptyline (ELAVIL) 50 MG tablet Take one 10mg tablet and one 50mg tablet together for a total daily dose of 60mg. 90 tablet 3     cetirizine (ZYRTEC) 10 MG tablet Take 1 tablet (10 mg) by mouth every morning.       Continuous Glucose Sensor (DEXCOM G7 SENSOR) MISC Change every 10 days. 9 each 4     Continuous Glucose Sensor (FREESTYLE LISA 3 SENSOR) MISC CHANGE EVERY 14 DAYS 2 each 11     diphenhydrAMINE (BENADRYL) 12.5 MG/5ML solution Take 25 mg by mouth 4 times daily as needed for other (nausea)       droNABinol (MARINOL) 5 MG capsule Take  "1 capsule (5 mg) by mouth 2 times daily as needed. 60 capsule 0     Emergency Supply Kit, Central, Patient use for emergency only. Contents: 3 sodium chloride 0.9% flushes, 1 dressing kit, 1 microclave ext set 14\", 4 nitrile gloves (med), 6 alcohol prep pads, 1 bacitracin, 1 syringe (10 cc 20 G 1\"). Call [phone] to reorder. [id number] kit 0     estradiol (VAGIFEM) 10 MCG TABS vaginal tablet INSERT 1 TABLET(10 MCG) VAGINALLY 2 TIMES A WEEK 24 tablet 3     famotidine (PEPCID) 20 MG tablet Take 1 tablet (20 mg) by mouth daily. 90 tablet 1     glucagon 1 MG kit Give 0.1 to 0.15mg( 10-15 units on Insulin sryinge) subcutaneous  every 15 minutes PRN for hypoglycemia. Remix new kit q24hr. Needs up to 3 kit/week. 10 each 3     glucose 40 % GEL gel Take 15-30 g by mouth every 15 minutes as needed for low blood sugar 3 Tube 2     insulin aspart (NOVOLOG FLEXPEN) 100 UNIT/ML pen Take 2 units with meals OR use insulin carbohydrate ratio 1 unit per 15 grams of carbohydrates three time daily with meals/snacks plus correction scale 1 unit per 50 >140 three times daily before meals and at bedtime (ok to correct >140 at bedtime since TPN is running overnight). Total daily Novolog: ~ 15 units/day 15 mL 5     insulin aspart (NOVOLOG VIAL) 100 UNITS/ML vial Uses up to 67 units daily for carb coverage, correction, and priming through insulin pump 60 mL 4     insulin glargine (LANTUS PEN) 100 UNIT/ML pen Inject 3 Units subcutaneously daily. 3 mL 3     insulin lispro (HUMALOG LORELEI KWIKPEN) 100 UNIT/ML (0.5 unit dial) KWIKPEN Take 0.5 units per 15 grams of carbohydrate, and correction as directed, up to 20 units per day supply. 3 mL 3     insulin syringe-needle U-100 (30G X 1/2\" 0.3 ML) 30G X 1/2\" 0.3 ML miscellaneous Use 3 syringes daily or as directed. 100 each 11     insulin syringe-needle U-100 (31G X 5/16\" 0.3 ML) 31G X 5/16\" 0.3 ML miscellaneous Use 3 syringes daily or as directed. 100 each 11     lactated ringers in 1,000 mL via " CADD pump Infuse into the vein daily as needed (dehydration). Remove air via CADD pump. Infuse 1 bag(s) (1000 mL). Each bag to infuse over 2 hours. Reservoir Volume 1000 mL. Continuous rate: 500 mL/hr. [id number] mL 0     lactated ringers infusion Inject 1,000 mLs into the vein daily as needed       levothyroxine (SYNTHROID/LEVOTHROID) 175 MCG tablet Take 1 tablet (175 mcg) by mouth daily. 90 tablet 3     lidocaine (LIDODERM) 5 % patch Place onto the skin as needed. To prevent lidocaine toxicity, patient should be patch free for 12 hrs daily.       lipase-protease-amylase (CREON) 67454-43102-518508 units CPEP per EC capsule Take 5- 7 caps with 3 meals a day and with 2 snacks a day 2250 capsule 3     methocarbamol (ROBAXIN) 750 MG tablet Take 1 tablet (750 mg) by mouth 4 times daily as needed for muscle spasms. 120 tablet 11     montelukast (SINGULAIR) 10 MG tablet Take 10 mg by mouth at bedtime.       Ostomy Supplies MISC 1 each every other day. 20 each 11     Ostomy Supplies MISC 20 each every other day. 20 each 11     pantoprazole (PROTONIX) 40 MG EC tablet Take 1 tablet (40 mg) by mouth daily. 90 tablet 3     Port Access Kit For nurse use only.  Do not remove items from bag.  Use for port access.  Do not place syringe on sterile field. [id number] kit 0     prochlorperazine (COMPAZINE) 10 MG tablet Take 1 tablet (10 mg) by mouth every 6 hours as needed for nausea or vomiting. 120 tablet 3     promethazine (PHENERGAN) injection Add to infusion 1 mL (25 mg) every 8 hours as needed for nausea or vomiting. Draw up in a syringe and add to homepump immediately prior to infusing.  Discard remainder of vial. 1095 mL 0     rimegepant (NURTEC) 75 MG ODT tablet Place 1 tablet (75 mg) under the tongue every 48 hours. 16 tablet 11     scopolamine (TRANSDERM) 1 MG/3DAYS 72 hr patch APPLY 1 PATCH TOPICALLY TO THE SKIN EVERY 72 HOURS 10 patch 5     Sharps Container (BD SHARPS ) MISC 1 Container as needed 1 each 1     silver  "nitrate (ARZOL SILVER NIT APPLICATORS) 75-25 % miscellaneous Apply topically as needed for wound care Apply Silver Nitrate Sticks every 2-3 days until granulation tissue resolved.  \"Roll\" tip of Silver Nitrate Q tip directly onto the granulation tissue-bulging tissue area.  Have Agency or Home Care Nurse administer this, if you prefer.  Take care not to touch any healthy tissue or skin.  The tissue will turn white and eventually black & scab off.  May repeat if needed in the future for recurrence. 30 applicator 0     sodium chloride 0.9% elastomeric pump Infuse 61 mLs over 15 minutes into the vein every 8 hours as needed (for use with promethazine). Add 1 mL (25 mg) of promethazine 25 mg/mL to homepump, then add 5 mL NaCl 0.9% to homepump to flush port, immediately prior to infusing. [id number] mL 0     sodium chloride, PF, 0.9% PF flush Inject 10 mLs into the vein as needed for line flush. Flush IV before and after med administration as directed and/or at least every 24 hours, or prior to deaccessing for no further use and/or at least every 4 weeks when not accessed. [id number] mL 0     sodium chloride, PF, 0.9% PF flush Add to infusion 5 mLs every 8 hours. Add to homepump after adding promethazine to flush port. 5400 mL 0     STATIN NOT PRESCRIBED (INTENTIONAL) Previous liver issues, risks vs benefits felt to not warrant statin.    Discussed Oct 2022 visit       zinc oxide - white petrolatum (CRITIC-AID THICK MOIST BARRIER) 20-51% PSTE topical paste Apply 71 g topically every hour as needed for rash (Under J tube bumper when needed for skin protection) 170 g 0     ZOLMitriptan (ZOMIG-ZMT) 5 MG ODT Take 1 tablet (5 mg) by mouth at onset of headache for migraine. Dissolve tablet in the mouth. May repeat in 2 hours. Max 2 tablets/24 hours. 12 tablet 11     zolpidem (AMBIEN) 5 MG tablet Take 1 tablet (5 mg) by mouth nightly as needed for sleep. 30 tablet 1     Current Facility-Administered Medications   Medication Dose Route " Frequency Provider Last Rate Last Admin     Botulinum Toxin Type A (BOTOX) 200 units injection 200 Units  200 Units Intramuscular See Admin Instructions    200 Units at 04/28/25 1103         PHYSICAL EXAMINATION:  Vitals There were no vitals taken for this visit.   Wt   Wt Readings from Last 2 Encounters:   04/09/25 71.2 kg (157 lb)   04/03/25 71.8 kg (158 lb 6.4 oz)      Gen: Pt sitting up in NAD, interactive and cooperative on exam  Eyes: sclerae anicteric, no injection  ENT:  MMM  Resp: Breathing comfortably on exam, speaking in full sentences  Skin: No jaundice  Neuro: alert, oriented, answers questions appropriately        PERTINENT STUDIES:    Lab Requisition on 04/22/2025   Component Date Value Ref Range Status     Iohexol Body Surface Area 04/22/2025 1.885  m2 Final     Coeff Determination 04/22/2025 0.995  % Final     Iohexol Raw Clearance 04/22/2025 93  mL/min Final     Iohexol Std Clearance 04/22/2025 85  /1.73 m2 Final     Iohexol Time 1 04/22/2025 8.49  mg/dL Final     Iohexol Time 2 04/22/2025 7.35  mg/dL Final     Iohexol Time 3 04/22/2025 6.28  mg/dL Final     Iohexol Time 4 04/22/2025 5.17  mg/dL Final     Iohexol Time 5 04/22/2025 4.35  mg/dL Final     Iohexol Time 2 04/22/2025 7.35  mg/dL Final     Iohexol Time 3 04/22/2025 6.28  mg/dL Final     Iohexol Time 4 04/22/2025 5.17  mg/dL Final     Iohexol Time 5 04/22/2025 4.35  mg/dL Final       Again, thank you for allowing me to participate in the care of your patient.        Sincerely,        Vijaya Genao MD    Electronically signed

## 2025-05-07 NOTE — PROGRESS NOTES
Virtual Visit Details    Type of service:  Video Visit   Video Start Time: 10:29 a.m.  Video End Time: 11:21 a.m    Originating Location (pt. Location): Home  Distant Location (provider location):  On-site  Platform used for Video Visit: Emeli    -------------------------------------  GI CLINIC VISIT    CC: follow-up       ASSESSMENT/PLAN:  (K21.9) Gastroesophageal reflux disease without esophagitis  (primary encounter diagnosis)  (R11.0) Nausea  (Z90.49) S/P total colectomy  (Z93.2) Status post ileostomy (H)     Nausea after bending over - would want to aggressively treat any component of GERD/bringing up of refluxant. Overall she feels reflux is worse. Will switch to more potent PPI (was originally on pantoprazole as it was available in liquid and IV formulations when she could not tolerate oral pills). Also consider Gaviscon with alginate for breakthrough symptoms as well as minimizing activity that requires bending over. Would recommend patient monitor for other associated symptoms - doesn't seem to be experiencing vertigo, HA, etc. with bending over, but if occurs would consider other non-GI drivers (?hypotension, ?BPPV, ?migraine variant).     Total amount of ostomy output is within expected range and consistency is generally good. Some episodes of more liquid stools. Can try and address with methylcellulose. Her bigger concern is that much of the emptying is at night and she may awaken due to pressure of a full ostomy bag. She is not having nighttime explosions or leaking - reports she does have a larger volume nighttime ostomy bag. It is expected that one has migrating motor complexes while sleeping to empty the bowels and noted that position while lying down seems to aid in emptying of her stoma. Could trial caffeine during the day to get a bit more output before bed or even some schedule lying down time. Agree with prior recs to stay up later in the evening and empty bag right before lying down. Could try  scheduled awakening after a full sleep cycle (3 or so hours) then empty ostomy bag, then go back to bed - wonder if this would limit the nights of having 2 or more awakenings, but will certainly defer to those managing her insomnia.     - stop pantoprazole  - start omeprazole 40 mg each morning, monitor response, we can increase to twice daily if needed to help address any component of GERD to symptoms after bending over  - can try Gaviscon with alginate for breakthrough heartburn  - try timing caffeine with a period of lying down during the day to promote antegrade bowel function and ostomy emptying to see if less emptying needs to happen at night  - ensure you have the largest nighttime ostomy bag you can get   - can consider scheduled waking after a complete sleep cycle - get up out of bed (as insomnia team suggested if you are awake at night), empty ostomy bag, then trial a second sleep cycle  - ok to stay up later and empty ostomy bag right before bed  - try Citrucel/methylcellulose 1 tablespoon in water a day to help thicken output more, monitor response   - continue to avoid artificial sugars which can lead to loose bowel output  - ok for antiemetics as needed  - follow-up in GI clinic in 3-6 months      It was a pleasure to participate in the care of this patient; please contact us with any further questions.  A total of 52 video face-to-face minutes was spent with this patient. An additional 30 minutes was spent on the date of the encounter doing chart review, documentation, care coordination, and further activities as noted above.    Vijaya Genao MD  Associate Professor of Medicine  Division of Gastroenterology, Hepatology and Nutrition  [facility]    ---------------------------------------------------------------------------------------------------  HPI:    Ms. Loo is a 60 year old female with chronic pancreatitis disease s/p TPIAT (12/2016), prior elevated LFTs and hepatic dome  lesion with focally increased IgG 4 (previously followed in pancreas transplant clinic, rhematology, and hepatology), with gastroparesis, constipation which failed medical management s/p total abdominal colectomy with ileorectal anastomosis and loop ileostomy (11/2024, also with extensive BAUDILIO, revision of J-tube and small bowel resection), GERD, migrane HAs, hypothyroidism, and history of pituitary adenoma s/p resection who presents for follow-up for multiple GI symptoms. Her last visit with our clinic was 11/20/2024.     Since her last visit, she has been doing ok. She feels many things are better, but expresses that she is fatigued physically but also, understandably, tired of having to work so hard on so many medical issues. She is looking forward to her daughter's weeding in early July and being able to be there. She has played golf twice this year, but it was tiring. She would like to work again at some point, but is struggling to see how that would be possible given her many medical needs.     She continues to follow with neurology for migraines and is undergoing Botox injections.   Dr. Melissa of endocrinology identified low iron levels and placed Ms. Loo on iron infusions; she is hopeful this will improve her energy. She just started on an insulin pump which has been going ok for the one day she's had it. She is also planned for a carotid US to evaluate potential plaque as she states changes were seen in her vessels as part of a study she's enrolled in.     She continues to have nighttime awakenings - sometimes she awakens from pain, sometimes from the heaviness of a full ostomy bag, sometimes from something else - only infrequently does she sleep through the night. She is bothered by this and did undergo insomnia CBT. She felt that there was little they could offer in her particular circumstance, though she was told if she is awake/alert at night, she should just get out of bed and do something so she's  been focusing on art or knitting when this happens       Please see below for discussion of her GI issues.          Gastroparesis, nausea and vomiting  Venting G-tube -  removed  Tube feeds via J-tube - removed    Taken/summarized from my earlier documentation:  Initially diagnosed years ago with 2-hour study gastric emptying study at [facility]. Repeat testing here with 4-hour GES on 6/27/2016 with 49% remaining at 4 hours (?on pain meds at the time).  This was addressed with prior GJ tube in fall 2016, NG tube used for venting. Ultimately, after TPIAT, she was able to wean off of TFs and return to a regular diet.  Patient did have some persisting nausea and vomiting which she treated over the years with various medications including scopolamine patch, prochlorperazine, and promethazine. For GP in the past she had no response to metoclopramide. She has not been able to try erythromycin or azithromycin due to anaphylactic event with prior azithromycin use (in ED in 2008) She has also been seen by neurology and psychology to assist with insomnia and with migraines (and any contribution they may have to nausea).       Unfortunately, things acutely worsened in November 2020.  An ED evaluation at that time was unremarkable and CT only revealed moderate stool burden, nonspecific fluid in small intestine.  Theses episodes continued to recur.       Bowel regimen was adjusted to ensure adequate output. Dietary changes were made (freq, small meals to liquid-only) and support with antiemetics. Initially these dietary changes seemed to help, but over time the response waned. Another course of rifaximin (previously successful at addressing bloating and stool issues) was not helpful for her pain, bloating/fullness, and n/v. Amitriptyline was increased to 60 mg nightly with no change in discomfort.    From winter 2020 to May/Saba she lost about 16 lbs (from 156-->140 lbs). She continued to experience more pronounced  post-prandial pain and fullness and had vomiting of food after eating. Work-up for alternate causes (with SBFT, CTE, CTA) was normal.       A GJ was placed in Sept 2021, but this was replaced a few times due to J-tube coiling in stomach as well as G-tube clogging/malfunction. On 10/19/21 she had placement of a weighted GJ but continued to struggle with discomfort at insertion site, clogging/venting issues. Ultimately a direct jejunostomy was done and after struggling with this for awhile it was removed. She worked hard to return to an oral diet.      By summer 2022, Ms. Loo had only a G-tube for venting.  At that time she was tolerating one small plate/1 cup/1 bowl of food without vomiting, 70% of the time. She would have 5-6 of these servings a day (~1848-5313 Kcal). Ms. Loo also had regular protein supplement drinks and hydration drinks. Her weight stabilized around 140 lbs. For nausea, she continued on her chronic scopolamine patch as well as prochlorperazine and promethazine about every 6-8 hours.       Later in 2022 she had progressive decline in her ability to tolerate oral intake and continued to lose weight. She was initiated on domperidone by Dr. Mandeep Park, but did not have response even with up-titration of dosing. Small bowel feeds were discussed and her G (KISHORE-KEY) was replaced with a G-J a couple times, but had issues with J-tube extension coiling. Ultimately on 12/30/22, Dr. Bauer placed a direct J in the OR; extensive BAUDILIO and a resection of 15 cm of SB with dense adhesions was also done. TFs were started and continued in earnest from that point on.      In Jan 2023, unfortunately a perforation occurred during G and J tube exchange. She went to the OR that day. Jejunal perforation was repaired with bowel resection and additional BAUDILIO was performed. She was hospitalized thereafter and TFs were restarted.      Through early 2023, she felt better on just jejunal feeds with reduction in  emesis, though still experienced nausea  for which she took: 1 scopolamine patch daily, IV promethazine once daily with J-tube promethazine at night, and compazine 10 mg - once daily. Her TFs were running at 45 mL for 18 hrs a day with oral intake is just bites of food for pleasure. She continued to vent her G-tube frequently - mostly seeing clear, colored liquid - no TFs. She continued on Relizorb digestive enzyme cartridge and did an IVF bolus via port each day.     In fall 2023,  Ms. Loo reported an ED presentation due to symptoms concerning for SBO, though imaging was unremarkable. She stated she had two hours of diarrhea after leaving the ED and felt markedly better. Around that time, she was vomiting about 5 times a week. This improved with her doing 24/7 venting. In addition to her chronic antiemetics, she started buprenorphine patch and medical MJ which were helpful. She continued on J-tube feeds (for all nutritional needs, was not tolerating things by mouth).       By early 2024, nausea and emesis have continued to impact any oral intake. Jejunal feeds continued (see constipation below) and symptoms worsened when her bowels were backed up (see constipation below).      In March 2024, emesis was a bit better though persisting. She had nausea each day, though vomiting was only about 3 days a week.    For TFs she was getting in 4 cans/day, 1500 kcal a day, running at 90 mL/hr for 11 hours, eating a small amount. She is worried she is losing weight again. At that visit we discussed altering TF goals with [facility] team.       In June 2024, Ms. Loo had further increased TFs up to 120 mL/hr for 10-12 hours a day. She was tolerating some bites of food and on a good day a small bowl of ice cream or Cream of Wheat. There are days when oral intake does not go as well and she recalls an episode where she vomited yogurt and blueberries that she'd had 7 hours earlier. Weight was stable, but not gaining as  she'd like. She did have some G-tube issues in July 2024 which required management by our advanced endoscopy team. The G-tube was out for a short time, though a new one was soon after placed due to need for chronic venting. She was venting most of the day (but G-tube is closed for an hour or so after taking pills). This continued to help address her bloating.       In Sept 2024, she maintained a stable weight around 140-150 lbs. Her TF formula was switched to CultureIQ which she tolerated better. Oral intake was around 300 calories/day, generally having something liquid/soft in the evening. TPN decreased down to 3 days a week. She occasionally vomited up what she ate hours later. If she does not vomit, she will experience marked bloating. She continued with IV promethazine and compazine suppositories as well as with scopolamine patch. The tablet form of compazine sometimes worked for her when she was out and about. Dr. Elmore at [facility] (whom she saw for a second opinion regarding colectomy, see below) did start her on meclizine. Ms. Loo did not find it useful and reported significant drowsiness . She had more regularly been taking benadryl for tape allergy issues and had found it useful for nausea at times.   Emend was also ordered by Dr Elmore, but declined by her insurance company.       Ultimately in Oct 2024,  Ms. Loo had a total abdominal colectomy w/ ileorectal anastomosis and diverting loop ileostomy 10/11/2024 with Dr. Branch. Her new J-tube site (had to be redone during the surgery with extensive BAUDILIO and SB resection) had ongoing issues with leaking and it did fall out. Decision was made not to replace it.She had much improvement in her GP symptoms and ability to eat after colectomy and loop ileostomy.  She was eating 1-1.5 cups of various foods three times a day with TPN supplementation 3 days a week. She continued to vent via her G-tube if having issues and continued promethazine  (usually 2 times a day). She also used prn LR for hydration.     Today, Ms. Loo has since had her venting G-tube removed (12/2024) as it was no longer needed. She has stopped her TPN altogether. She is exclusively getting nutrition via oral diet and is pleased to report she can find foods on pretty much any menu that she can tolerated. She is still using LR about every other day or so. She does try to focus on adequate oral hydration and drinks electrolyte drinks. She does use cannabis occasionally which helps with appetite as well as nausea and pain. Her weight has increased and she is up to a size 8 from a size 0 (pre-op when not tolerating nutrition).    She is noting more nausea lately - which is seemingly drive by GERD and post-nasal drip (see GERD below). She does not need refills on antiemetics today.           Constipation/irregular bowel movements/bloating    Taken/summarized from my prior documentation:  Patient reported history of ongoing constipation for over 20 years after having her first child.  She had tried multiple interventions including regular bowel cleanouts, MiraLAX, senna, milk of magnesium, Linzess, and Amitiza for which she was on when she first came [facility].  Note, patient has been on pancreatic enzyme replacement at times as well.    Amitiza had worked for awhile then lost effectiveness. She was trialed on Trulance but reported frequent loose stools in the initial days (including nocturnal stooling and incontinence) which prompted her to stop. Trial of rifaximin in the past with with improvement in bloating and stool consistency though, as stated above, a re-trial for her pain, n/v, and fullness was not successful.    Motegrity was then started with some success, though noted HAs. We discussed a possible switch in her regimen to address this, though she was most comfortable with continuing Motegrity and monitoring her symptoms.  Previously discussed combining daily constipation  "medications; noted patient reports cost for Amitiza was quite a lot  (running $1000+/month).     By 2022, she was on Motegrity daily as well as Miralax 1 capful BID, 4 senna a day, 1000 mg Mag citrate daily, and stool softeners. With this she would have no BMs for 3-4 days (though has gone anywhere from 1-8 days), then have a day of emptying where she has multiple BMs over a day. These were often \"enormous\" and she felt empty when complete. Noted her bowel pattern is complicated by her need for antiemetics. When she is \"backed up\" she will experience early satiety.       By late 2022, she felt BMs were improved with restart of TFs. She stopped Motegrity/prucalopride around 12/30/2022. She was taking only 2.5 mL of liquid senna BID (total 8.8 mg daily).  With this Ms. Loo, was moving her bowels 3-4 days out of a week. On the days she moved her bowels it was typically more than once (2-3 times). Stool consistency was soft, \"ribbon-like\" stools. She denied straining. When asked about HAs she did feel these were better - unclear if this is because she went off Motegrity or if due to treatment of migraines  (received Botox injections.)     In fall 2023, she's restarted Motegrity, Senna twice daily (5 mL) with Miralax prn. She continued on amitriptyline (discomfort). With this she had a BM 5 out of 7 days , nearly always in the morning. She may have a few more  BMs later in the day, though she's done by early afternoon. Stools were often formed, smooth, and \"sticky\", occasionally loose.  She continued on Relizorb.     In early 2024,  Ms. Loo had increasing nausea, abdominal pain, and decreased stool output. Please see [employer] messages and telephone encounters for further details. Acute CT imaging did not reveal an SBO or other pathology. Home bowel regimen was not particularly successful. She ended up getting a soapsuds enema at her local ED with results and some improved symptoms. We pursued a therapeutic " gastrograffin enema and aggressive bowel regimen to ensure colonic emptying to address worsening pain, discomfort, and nausea.  We then adjusted her bowel regimen with plan for periodic GGE and consideration of adding Movantik (to be discussed with pain team as well).      In March 2024, she continued with pelvic floor PT and was feeling a little more in sync with biofeedback. She continued home PT exercises and noted some improvement with bladder control too. Bowel pattern was: BMs on 5/7 days, about 4 of the 7 days had good amount of stool output. She has been doing enemas about every 2-3 days (just had two days without enemas, then did one today with very large output.). Stools are all loose.     In June 2024, Ms. Loo continued on Motegrity and had been started Movantik 60 mg through pain clinic. She continued on sennosides 8.8 mg BID and was doing pink lady enemas about 3-4 times a week choosing to administer one when she feel uncomfortable. Afterwards she will pass a palm-size stool - ice cream consistency with a lot of pink water. She passes a lot of flatus as well.  She has some relief of discomfort after evacuating and passing this gas, though she doesn't feel cramping and bloating improve totally.        She was able to see Dr. Branch of colorectal surgery for discussion of potential surgical options. Total abdominal colectomy with likely ileostomy (known pelvic floor dysfunction) was reviewed though possible ileorectal anastomosis diverting loop ileostomy was discussed.  She was able to be seen at [facility] by Dr. Elmore on 7/30/24 for a second opinion. A work-up was undertaken for autoimmune dysmotility which was reportedly normal. Ibsrela was added to her regimen and a restart of Trulance was attempted as well (though insurance did not cove the medication so she did not start it). Colectomy was recommended at [facility]. She expressed that the [place] team felt that many of her  "upper GI symptoms were driven by poor lower GI dysmotility and may improve with colectomy. She states she was told that gastroparesis is not the primary issue and this resonated with her.     As noted above she had a total abdominal colectomy with loop ileostomy in Oct 2024 and post-operatively was able to go off of all bowel meds. As noted above, she was able to return to an oral diet and is off of all TFs and TPN (though still gets IVF infusions as needed).      Today, Ms. Loo shares that she is still \"grossed out\" at having to have an ostomy, but is not interested in a reversal noting her JONES recently had one and is struggling with fecal incontinence.      She is able to vent her ostomy well and is having less issues with \"explosions\" of her ostomy. She is bothered that her ostomy really seems to fill at night. She feels some of this is positional given angle of her stoma, but she doesn't empty much after napping. Her output at night is 6-7 cups at most, with less than half that amount coming out during the day. She does report frequent daytime gas in the ostomy bag which she vents without issue. Only infrequently does she sleep through the night. She often awakens - sometimes from pain, sometimes from heaviness of a full ostomy, sometimes from something else - and, therefore, feels unrested by the time morning arrives. She is napping during the day. The ostomy consistency is generally \"applesauce\" and she denies issues with clogging/pancaking. Sometimes her output is more liquid-y. She has done dietary measures to increase fiber for bulking. Simethicone has helped a bit with gas. Ostomy team recommended an increase in Creon, though she had not noted any steatorrhea (she is definitely aware of steatorrhea if she misses a Creon dose). She is doing well on this dose.          GERD, Dysphagia   Summarized/taken from prior documentation  Patient experiences GERD as substernal and throat burning with nocturnal " "relaxant causing choking. She reports a prior Schatzki's ring requiring previous dilation. Manometry in 2015 was normal, and pH impedence at that time had a DeMeester of 24 with positive symptoms association. Patient has treated GERD symptoms with some combination of BID PPI, Carafate, H2 blocker, and Tums in the past. She'd had issues with access to liquid PPI due to insurance and liquid famotidine had not be sufficient for symptoms. Omeprazole by mouth seemed to never pass the stomach/get absorbed and would come out the G-tube when vented even when clamped for an hour or so (clamping longer led to more severe pain and symptoms). IV pantoprazole was ultimately started (issues digesting the pill).      She'd transitioned to oral pantoprazole post-op and continued since her discharge.      At her last visit, GERD was worse in the evening, but if she stopped eating at a certain time this was able to be managed.    Today, she describes issues with \"nausea\". She feels as if this is driven by post-nasal drip running down her throat/esophagus and she reports this allergy season (and this week) have been particularly bad for her. She also reports much nausea after activity that requires her to bend over (tying shoe, gardening) - it doesn't feel exactly like bringing up of refluxant but she is noticing worsening reflux in general. She also feels the sensation hangs around for awhile after bending over. She is focusing on smaller, more frequent meals which may help. She is still using scopolamine patch too.        Pain:   In regards to pain she had followed initially in our pain clinic for management of bulging disk as well as chronic abdominal pain for which she previously had local injections. Ms. Loo shares that much of her abdominal pain is a lot better after BAUDILIO and her first SB resection.  She was off of all pain medications except Tylenol and methycarbomal (for back pain) until her surgery and hospitalization for " "management of her perforation. She's continued to have pain at both her G and J-tube site.      She followed with [facility] for some time.     Has had a variety of pain locations over the years - G-tube and J-tube sites, intermittent pelvic pain described like \"labor pain\", PICC line pain, post-op incisional pain, stoma pain.    Today, she reports some pain just above the stoma, around her lowest rib that is intermittent and colicky. No noted stomal changes with this pain. She reports her pain provider has moved and she's wondering if she should establish with another provider or not. Pain medications primarily include amitriptyline, methocarbamol, marinol, etc.. Is not currently needing opioid pain meds. She will discuss with her PCP at upcoming appointment; we do have a pain clinic in the  system.         PROBLEM LIST  Patient Active Problem List    Diagnosis Date Noted    Dislodged jejunostomy tube 11/18/2024     Priority: Medium    Constipation 07/23/2024     Priority: Medium    Right upper quadrant abdominal pain 07/23/2024     Priority: Medium    S/P pancreatic islet cell transplantation (H) 07/23/2024     Priority: Medium    Bacteremia 11/10/2023     Priority: Medium    Abdominal pain, unspecified abdominal location 11/09/2023     Priority: Medium    Cholangitis (H) 06/07/2023     Priority: Medium    On total parenteral nutrition (TPN) 06/04/2023     Priority: Medium    Fever, unspecified fever cause 06/04/2023     Priority: Medium    Chronic pancreatitis, unspecified pancreatitis type (H) 06/04/2023     Priority: Medium    History of food intolerance 05/09/2023     Priority: Medium    Malnutrition, unspecified type 05/09/2023     Priority: Medium    Nausea and vomiting, unspecified vomiting type 05/09/2023     Priority: Medium    Major depression, single episode 03/06/2023     Priority: Medium    Acute postoperative abdominal pain 01/31/2023     Priority: Medium    Feeding intolerance 12/30/2022     " Priority: Medium    Myofascial pain 10/14/2022     Priority: Medium     Added automatically from request for surgery [id number]      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  pancreas and spleen removed, islet cells transplanted into liver    3 classes of vaccines needed .    1. Pneumococcal vaccines are as follows:       PCV 13 - one time dose (was given 2017)       PPSV23 - (given 12/1/16); repeat q 5 years      ROLE of PCV 20???    2. Meningococcal vaccines     Menactra - (last given 12/1/16) -  repeat q 5 yrs   NOTE: need to wait 4 wks after PCV 13 has been given.   OR - give Menveo instead as it can be given same time as PCV 13    AND   Bexcero - (got 12/9/16) - does not need to be repeated.     3. The Hib vaccine - (got 12/9/16) - does not need to be repeated.      Poisoning by drug 05/09/2022     Priority: Medium     Formatting of this note might be different from the original.  Per Care Everywhere review:  All oral, IV and injectable steroids are contraindicated (unless in life threatening situations) in Islet Auto transplant recipients. They can cause irreversible loss of islet cell function. Please contact patient's transplant care coordinator ADI Gaffney RN at [phone]/pager [phone] and/or endocrinologist prior to administration.      Type 1 diabetes mellitus without complication (H) 05/09/2022     Priority: Medium     Formatting of this note might be different from the original.  ACTUALLY - DM 3c - pathogenic diabetes as no pancreas.  (pancreas and spleen removed, islet cells transplanted into liver)    Seeing ENDO at [facility] - Dr Erum Melissa      Intra-abdominal abscess (H) 12/03/2021     Priority: Medium    Postprocedural intraabdominal abscess (H) 12/03/2021     Priority: Medium    Gastrostomy tube obstruction (H) 11/27/2021     Priority: Medium    Abdominal pain, generalized 09/18/2021     Priority: Medium     Adult failure to thrive 08/16/2021     Priority: Medium     Added automatically from request for surgery [id number]      Hypoglycemia 08/05/2021     Priority: Medium    Iron deficiency anemia 03/22/2019     Priority: Medium    Headache, chronic migraine without aura 09/18/2018     Priority: Medium    Chronic pain syndrome 09/18/2018     Priority: Medium    S/P hernia repair 08/23/2018     Priority: Medium    Incisional hernia, without obstruction or gangrene 05/20/2018     Priority: Medium    Adhesive capsulitis of shoulder, unspecified laterality 11/14/2017     Priority: Medium    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  Excess IgG4  [facility]      Gastroparesis      Priority: Medium    Pancreatic insufficiency 01/17/2017     Priority: Medium    Diabetes insipidus 01/05/2017     Priority: Medium     Formatting of this note might be different from the original.  Diabetes Insipidus (DI)  Per external records.  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  Depression NOS      Other type of intractable migraine      Priority: Medium     Diagnosis updated by automated process. Provider to review and confirm.      GERD (gastroesophageal reflux disease) 12/01/2010     Priority: Medium    Lumbago 04/18/2005     Priority: Medium     History of L5-S1 degenerative disk disease.        Sensorineural hearing loss 01/10/2005     Priority: Medium     Problem list  "name updated by automated process. Provider to review      Vertiginous syndrome and labyrinthine disorder 01/10/2005     Priority: Medium     Problem list name updated by automated process. Provider to review  IMO Regulatory Load OCT 2020      Sprain and strain of other specified sites of hip and thigh 12/09/2002     Priority: Medium    Chondromalacia of patella 12/09/2002     Priority: Medium    Need for prophylactic immunotherapy      Priority: Medium     trees, grass, srw, dust mites, cat      Allergic rhinitis due to other allergen 12/21/2001     Priority: Medium    Hypothyroidism      Priority: Medium     Problem list name updated by automated process. Provider to review         PERTINENT MEDICATIONS:  Current Outpatient Medications   Medication Sig Dispense Refill    acetaminophen (TYLENOL) 325 MG/10.15ML solution 20.3 mLs (650 mg) by Per J Tube route every 6 hours as needed for mild pain or fever 473 mL 4    amitriptyline (ELAVIL) 10 MG tablet Take one 10mg tablet and one 50mg tablet together for a total daily dose of 60mg. 90 tablet 3    amitriptyline (ELAVIL) 50 MG tablet Take one 10mg tablet and one 50mg tablet together for a total daily dose of 60mg. 90 tablet 3    cetirizine (ZYRTEC) 10 MG tablet Take 1 tablet (10 mg) by mouth every morning.      Continuous Glucose Sensor (DEXCOM G7 SENSOR) MISC Change every 10 days. 9 each 4    Continuous Glucose Sensor (FREESTYLE LISA 3 SENSOR) MISC CHANGE EVERY 14 DAYS 2 each 11    diphenhydrAMINE (BENADRYL) 12.5 MG/5ML solution Take 25 mg by mouth 4 times daily as needed for other (nausea)      droNABinol (MARINOL) 5 MG capsule Take 1 capsule (5 mg) by mouth 2 times daily as needed. 60 capsule 0    Emergency Supply Kit, Central, Patient use for emergency only. Contents: 3 sodium chloride 0.9% flushes, 1 dressing kit, 1 microclave ext set 14\", 4 nitrile gloves (med), 6 alcohol prep pads, 1 bacitracin, 1 syringe (10 cc 20 G 1\"). Call [phone] to reorder. [id number] " "kit 0    estradiol (VAGIFEM) 10 MCG TABS vaginal tablet INSERT 1 TABLET(10 MCG) VAGINALLY 2 TIMES A WEEK 24 tablet 3    famotidine (PEPCID) 20 MG tablet Take 1 tablet (20 mg) by mouth daily. 90 tablet 1    glucagon 1 MG kit Give 0.1 to 0.15mg( 10-15 units on Insulin sryinge) subcutaneous  every 15 minutes PRN for hypoglycemia. Remix new kit q24hr. Needs up to 3 kit/week. 10 each 3    glucose 40 % GEL gel Take 15-30 g by mouth every 15 minutes as needed for low blood sugar 3 Tube 2    insulin aspart (NOVOLOG FLEXPEN) 100 UNIT/ML pen Take 2 units with meals OR use insulin carbohydrate ratio 1 unit per 15 grams of carbohydrates three time daily with meals/snacks plus correction scale 1 unit per 50 >140 three times daily before meals and at bedtime (ok to correct >140 at bedtime since TPN is running overnight). Total daily Novolog: ~ 15 units/day 15 mL 5    insulin aspart (NOVOLOG VIAL) 100 UNITS/ML vial Uses up to 67 units daily for carb coverage, correction, and priming through insulin pump 60 mL 4    insulin glargine (LANTUS PEN) 100 UNIT/ML pen Inject 3 Units subcutaneously daily. 3 mL 3    insulin lispro (HUMALOG LORELEI KWIKPEN) 100 UNIT/ML (0.5 unit dial) KWIKPEN Take 0.5 units per 15 grams of carbohydrate, and correction as directed, up to 20 units per day supply. 3 mL 3    insulin syringe-needle U-100 (30G X 1/2\" 0.3 ML) 30G X 1/2\" 0.3 ML miscellaneous Use 3 syringes daily or as directed. 100 each 11    insulin syringe-needle U-100 (31G X 5/16\" 0.3 ML) 31G X 5/16\" 0.3 ML miscellaneous Use 3 syringes daily or as directed. 100 each 11    lactated ringers in 1,000 mL via CADD pump Infuse into the vein daily as needed (dehydration). Remove air via CADD pump. Infuse 1 bag(s) (1000 mL). Each bag to infuse over 2 hours. Reservoir Volume 1000 mL. Continuous rate: 500 mL/hr. [id number] mL 0    lactated ringers infusion Inject 1,000 mLs into the vein daily as needed      levothyroxine (SYNTHROID/LEVOTHROID) 175 MCG tablet " "Take 1 tablet (175 mcg) by mouth daily. 90 tablet 3    lidocaine (LIDODERM) 5 % patch Place onto the skin as needed. To prevent lidocaine toxicity, patient should be patch free for 12 hrs daily.      lipase-protease-amylase (CREON) 70990-24068-106933 units CPEP per EC capsule Take 5- 7 caps with 3 meals a day and with 2 snacks a day 2250 capsule 3    methocarbamol (ROBAXIN) 750 MG tablet Take 1 tablet (750 mg) by mouth 4 times daily as needed for muscle spasms. 120 tablet 11    montelukast (SINGULAIR) 10 MG tablet Take 10 mg by mouth at bedtime.      Ostomy Supplies MISC 1 each every other day. 20 each 11    Ostomy Supplies MISC 20 each every other day. 20 each 11    pantoprazole (PROTONIX) 40 MG EC tablet Take 1 tablet (40 mg) by mouth daily. 90 tablet 3    Port Access Kit For nurse use only.  Do not remove items from bag.  Use for port access.  Do not place syringe on sterile field. [id number] kit 0    prochlorperazine (COMPAZINE) 10 MG tablet Take 1 tablet (10 mg) by mouth every 6 hours as needed for nausea or vomiting. 120 tablet 3    promethazine (PHENERGAN) injection Add to infusion 1 mL (25 mg) every 8 hours as needed for nausea or vomiting. Draw up in a syringe and add to homepump immediately prior to infusing.  Discard remainder of vial. 1095 mL 0    rimegepant (NURTEC) 75 MG ODT tablet Place 1 tablet (75 mg) under the tongue every 48 hours. 16 tablet 11    scopolamine (TRANSDERM) 1 MG/3DAYS 72 hr patch APPLY 1 PATCH TOPICALLY TO THE SKIN EVERY 72 HOURS 10 patch 5    Sharps Container (BD SHARPS ) MISC 1 Container as needed 1 each 1    silver nitrate (ARZOL SILVER NIT APPLICATORS) 75-25 % miscellaneous Apply topically as needed for wound care Apply Silver Nitrate Sticks every 2-3 days until granulation tissue resolved.  \"Roll\" tip of Silver Nitrate Q tip directly onto the granulation tissue-bulging tissue area.  Have Agency or Home Care Nurse administer this, if you prefer.  Take care not to touch any " healthy tissue or skin.  The tissue will turn white and eventually black & scab off.  May repeat if needed in the future for recurrence. 30 applicator 0    sodium chloride 0.9% elastomeric pump Infuse 61 mLs over 15 minutes into the vein every 8 hours as needed (for use with promethazine). Add 1 mL (25 mg) of promethazine 25 mg/mL to homepump, then add 5 mL NaCl 0.9% to homepump to flush port, immediately prior to infusing. [id number] mL 0    sodium chloride, PF, 0.9% PF flush Inject 10 mLs into the vein as needed for line flush. Flush IV before and after med administration as directed and/or at least every 24 hours, or prior to deaccessing for no further use and/or at least every 4 weeks when not accessed. 133758 mL 0    sodium chloride, PF, 0.9% PF flush Add to infusion 5 mLs every 8 hours. Add to homepump after adding promethazine to flush port. 5400 mL 0    STATIN NOT PRESCRIBED (INTENTIONAL) Previous liver issues, risks vs benefits felt to not warrant statin.    Discussed Oct 2022 visit      zinc oxide - white petrolatum (CRITIC-AID THICK MOIST BARRIER) 20-51% PSTE topical paste Apply 71 g topically every hour as needed for rash (Under J tube bumper when needed for skin protection) 170 g 0    ZOLMitriptan (ZOMIG-ZMT) 5 MG ODT Take 1 tablet (5 mg) by mouth at onset of headache for migraine. Dissolve tablet in the mouth. May repeat in 2 hours. Max 2 tablets/24 hours. 12 tablet 11    zolpidem (AMBIEN) 5 MG tablet Take 1 tablet (5 mg) by mouth nightly as needed for sleep. 30 tablet 1     Current Facility-Administered Medications   Medication Dose Route Frequency Provider Last Rate Last Admin    Botulinum Toxin Type A (BOTOX) 200 units injection 200 Units  200 Units Intramuscular See Admin Instructions    200 Units at 04/28/25 1103         PHYSICAL EXAMINATION:  Vitals There were no vitals taken for this visit.   Wt   Wt Readings from Last 2 Encounters:   04/09/25 71.2 kg (157 lb)   04/03/25 71.8 kg (158 lb 6.4 oz)       Gen: Pt sitting up in NAD, interactive and cooperative on exam  Eyes: sclerae anicteric, no injection  ENT:  MMM  Resp: Breathing comfortably on exam, speaking in full sentences  Skin: No jaundice  Neuro: alert, oriented, answers questions appropriately        PERTINENT STUDIES:    Lab Requisition on 04/22/2025   Component Date Value Ref Range Status    Iohexol Body Surface Area 04/22/2025 1.885  m2 Final    Coeff Determination 04/22/2025 0.995  % Final    Iohexol Raw Clearance 04/22/2025 93  mL/min Final    Iohexol Std Clearance 04/22/2025 85  /1.73 m2 Final    Iohexol Time 1 04/22/2025 8.49  mg/dL Final    Iohexol Time 2 04/22/2025 7.35  mg/dL Final    Iohexol Time 3 04/22/2025 6.28  mg/dL Final    Iohexol Time 4 04/22/2025 5.17  mg/dL Final    Iohexol Time 5 04/22/2025 4.35  mg/dL Final    Iohexol Time 2 04/22/2025 7.35  mg/dL Final    Iohexol Time 3 04/22/2025 6.28  mg/dL Final    Iohexol Time 4 04/22/2025 5.17  mg/dL Final    Iohexol Time 5 04/22/2025 4.35  mg/dL Final

## 2025-05-07 NOTE — PROGRESS NOTES
Infusion Nursing Note:  Dinora Mcghee presents today for Venofer, dose 3 of 3.    Patient seen by provider today: No   present during visit today: Not Applicable.    Note:   Infusion over 90 minutes.    Intravenous Access:  Implanted Port, accessed during prior appointment, to be left accessed on discharge for home use.    Treatment Conditions:  Not Applicable.    Report given to late shift nurse at 1515 to resume cares.    Administrations This Visit       iron sucrose (VENOFER) 300 mg in sodium chloride 0.9 % 290 mL intermittent infusion       Admin Date  05/07/2025 Action  $New Bag Dose  300 mg Rate  193.3 mL/hr Route  Intravenous Documented By  Chanel Shelton, MONICA                  /74   Pulse 65   Temp 98.1  F (36.7  C) (Oral)   Resp 18   SpO2 97%     Chanel Shelton, RN

## 2025-05-07 NOTE — NURSING NOTE
Current patient location: [address] [place] [id number]    Is the patient currently in the state of [place]? YES    Visit mode: VIDEO    If the visit is dropped, the patient can be reconnected by:VIDEO VISIT: Text to cell phone:   Telephone Information:   Mobile [phone]       Will anyone else be joining the visit? NO  (If patient encounters technical issues they should call [phone]    Are changes needed to the allergy or medication list? No    Are refills needed on medications prescribed by this physician? Discuss with provider    Rooming Documentation:  Questionnaire(s) completed    Reason for visit: RECHECK    [place]

## 2025-05-07 NOTE — PATIENT INSTRUCTIONS
Dear Dinora Mcghee    Thank you for choosing [facility] ([facility] for your infusion.  The following information is a summary of our appointment as well as important reminders.      We look forward in seeing you on your next appointment here at [facility]  Please don t hesitate to call us at [phone] to reschedule any of your appointments or to speak with one of the [facility] registered nurses.  It was a pleasure taking care of you today.    Sincerely,    [facility]  [address] [place] [address]  [id number]  Phone:  [phone]

## 2025-05-07 NOTE — PATIENT INSTRUCTIONS
- stop pantoprazole  - start omeprazole 40 mg each morning, monitor response, we can increase to twice daily if needed to help address any component of GERD to symptoms after bending over  - can try Gaviscon with alginate for breakthrough heartburn  - try timing caffeine with a period of lying down during the day to promote antegrade bowel function and ostomy emptying to see if less emptying needs to happen at night  - ensure you have the largest nighttime ostomy bag you can get   - can consider scheduled waking after a complete sleep cycle - get up out of bed (as insomnia team suggested if you are awake at night), empty ostomy bag, then trial a second sleep cycle  - ok to stay up later and empty ostomy bag right before bed  - try Citrucel/methylcellulose 1 tablespoon in water a day to help thicken output more, monitor response   - continue to avoid artificial sugars which can lead to loose bowel output  - ok for antiemetics as needed  - follow-up in GI clinic in 3-6 months     If you have any questions, please don't hesitate to contact me through our GI RN Clinic Coordinator, Ashley Mane, at [phone].

## 2025-05-08 ENCOUNTER — HOME INFUSION BILLING (OUTPATIENT)
Dept: HOME HEALTH SERVICES | Facility: HOME HEALTH | Age: 60
End: 2025-05-08
Payer: COMMERCIAL

## 2025-05-10 ENCOUNTER — HEALTH MAINTENANCE LETTER (OUTPATIENT)
Age: 60
End: 2025-05-10

## 2025-05-12 NOTE — PROGRESS NOTES
Dinora is a 60 year old who is being evaluated via a billable video visit.    How would you like to obtain your AVS? MyChart  If the video visit is dropped, the invitation should be resent by: Text to cell phone: [phone]  Will anyone else be joining your video visit? No      Are refills needed on medications prescribed by this physician?  DISCUSS WITH PROVIDER Medical marijuana    Assessment & Plan     Iron deficiency:  - Recent iron infusions have been administered, but the patient reports persistent fatigue.  - Recheck iron levels and CBC    Cramp of limb:  - Leg cramps have worsened, occurring during the day as well as at night.  - Recent electrolytes and TSH okay  - Obtain vitamin K2 supplements to see if that helps with symptoms    Chronic pain syndrome:  - Chronic pain persists, impacting daily activities and enjoyment of life.  - Recertified medical marijuana account to help manage nausea and stimulate appetite.    Nausea and vomiting, unspecified vomiting type:  - Nausea has been managed well with medical cannabis, except for recent flare-ups.  - Continue using medical cannabis as needed for nausea management.    Zinc deficiency:  - Zinc levels were checked a couple of months ago and were normal.  - Stop zinc supplementation with future re-evaluation of levels in a couple of months after discontinuation.    Depression  - Doing well off duloxetine    Consent was obtained from the patient to use an AI documentation tool in the creation of this note.              Subjective   Dinora is a 60 year old, presenting for the following health issues:  RECHECK and Recheck Medication    History of Present Illness       Mental Health Follow-up:  Patient presents to follow-up on Depression & Anxiety.Patient's depression since last visit has been:  Good  The patient is not having other symptoms associated with depression.  Patient's anxiety since last visit has been:  Good  The patient is not having other symptoms  associated with anxiety.  Any significant life events: No  Patient is not feeling anxious or having panic attacks.  Patient has no concerns about alcohol or drug use.    Hypothyroidism:     Since last visit, patient describes the following symptoms::  Anxiety, Depression, Dry skin and Fatigue    Reason for visit:  Fup iron deficiency, plaque in artery found in a medical study, chronic pain    She eats 2-3 servings of fruits and vegetables daily.She consumes 1 sweetened beverage(s) daily.She exercises with enough effort to increase her heart rate 10 to 19 minutes per day.  She exercises with enough effort to increase her heart rate 4 days per week.   She is taking medications regularly.      I saw Dinora last on 3/12 for hypothyroidism with normal TSH.  She was having some limb and ab cramps so we checked lytes as well which were also normal, so trial of Vitamin K2 was suggested.  Her mood was improving, so she wanted to try tapering off duloxetine.  2 month trial of Ambien and referral to [facility] were placed for insomnia which does not yet appear to be scheduled.      Carotid ultrasound was ordered for possible plaque noted on imaging done in a research study.  This is scheduled for 5/22     She was also recently found to have iron deficiency and IV iron has been ordered.      Dinora Mcghee, 60 years    Fatigue and Nausea  - Reports feeling incredibly fatigued, with only 4 hours of activity on the previous day  - Describes fatigue as feeling like walking through molasses  - Experienced increased nausea recently, with episodes of vomiting  - Had a busy weekend with a bridal shower and a [employer] game, but hydrated well using electrolytes and lactated Ringer's  - Reports weight gain of 4 lbs despite minimal food intake due to nausea  - Gastroparesis has been flaring up  - No correlation between symptoms and reduction of duloxetine; feels positive about stopping duloxetine    Travel Concerns  - Planning a short trip to  [place] from Saturday to Tuesday  - Concerned about managing symptoms with elevation change; plans to bring lactated Ringer's    Sleep Issues  - Reports improvement in sleep with strategies provided by Dr. Genao  - Took Ambien once last week at 2:00 AM due to difficulty sleeping  - Generally falls asleep within 20 minutes of laying down    Carotid Artery Concerns  - Unable to get an appointment until next week to check plaque in carotid artery  - Cholesterol levels are within normal range  - Concerned about statins causing gastroparesis    Medical Marijuana Use  - Requests recertification for medical marijuana account due in July  - Initially certified for nausea and appetite stimulation  - Reports no negative side effects from cannabis  - Has reduced opiate medication use due to cannabis    Pain and Activity  - Describes average pain level as 4 to 5 on a scale of 0 to 10  - Pain interferes with enjoyment of life and general activity; participates on the fringe of activities  - Relies on friends and family for support during events like bridal showers  - Plans to use a wheelchair for upcoming trip to [place] to manage fatigue    Leg Cramps  - Reports worsening leg cramps, now occurring during the day  - Tried banana and potassium without relief  - Has not yet tried vitamin K2 supplements    Weight and Past Health Issues  - Current weight is 157 lbs; weighed 117 lbs last summer  - Previously experienced severe constipation, requiring daily tap water enemas with Pink Lady mixture        Recertification for medical cannabis for abdominal pain and nausea:    1. What number (0-10) best describes your pain on average over the past week? 4-5    2. What number (0-10) best describes how, during the past week, pain has interfered with your enjoyment of life? 5    3. What number (0-10) best describes how, during the past week, pain has interfered with your general activity? 5    4. Overall, what benefits from medical  "cannabis have you noticed?  Reduced nausea and stimulates appetite    5. Any negatives?  None    6. Have you been able to reduce the amount of other medications you are using for pain?  If so, which med(s) and by how much?  Stopped opiates          Objective    Vitals - Patient Reported  Systolic (Patient Reported):  (Not today)  Weight (Patient Reported): 71.2 kg (157 lb)  Height (Patient Reported): 172.7 cm (5' 8\")  BMI (Based on Pt Reported Ht/Wt): 23.87  Pain Score: Moderate Pain (5)  Pain Loc: Abdomen      Vitals:  No vitals were obtained today due to virtual visit.    Physical Exam   GENERAL: alert and no distress  EYES: Eyes grossly normal to inspection.  No discharge or erythema, or obvious scleral/conjunctival abnormalities.  RESP: No audible wheeze, cough, or visible cyanosis.    SKIN: Visible skin clear. No significant rash, abnormal pigmentation or lesions.  NEURO: Cranial nerves grossly intact.  Mentation and speech appropriate for age.  PSYCH: Appropriate affect, tone, and pace of words        Video-Visit Details    Type of service:  Video Visit   Originating Location (pt. Location): Home    Distant Location (provider location):  On-site  Platform used for Video Visit: Emeli  Signed Electronically by: Juan Jose Gomez MD    "

## 2025-05-13 ASSESSMENT — ANXIETY QUESTIONNAIRES
4. TROUBLE RELAXING: NOT AT ALL
GAD7 TOTAL SCORE: 4
1. FEELING NERVOUS, ANXIOUS, OR ON EDGE: NOT AT ALL
3. WORRYING TOO MUCH ABOUT DIFFERENT THINGS: SEVERAL DAYS
5. BEING SO RESTLESS THAT IT IS HARD TO SIT STILL: SEVERAL DAYS
7. FEELING AFRAID AS IF SOMETHING AWFUL MIGHT HAPPEN: SEVERAL DAYS
GAD7 TOTAL SCORE: 4
8. IF YOU CHECKED OFF ANY PROBLEMS, HOW DIFFICULT HAVE THESE MADE IT FOR YOU TO DO YOUR WORK, TAKE CARE OF THINGS AT HOME, OR GET ALONG WITH OTHER PEOPLE?: SOMEWHAT DIFFICULT
7. FEELING AFRAID AS IF SOMETHING AWFUL MIGHT HAPPEN: SEVERAL DAYS
2. NOT BEING ABLE TO STOP OR CONTROL WORRYING: NOT AT ALL
6. BECOMING EASILY ANNOYED OR IRRITABLE: SEVERAL DAYS
IF YOU CHECKED OFF ANY PROBLEMS ON THIS QUESTIONNAIRE, HOW DIFFICULT HAVE THESE PROBLEMS MADE IT FOR YOU TO DO YOUR WORK, TAKE CARE OF THINGS AT HOME, OR GET ALONG WITH OTHER PEOPLE: SOMEWHAT DIFFICULT
GAD7 TOTAL SCORE: 4

## 2025-05-14 ENCOUNTER — HOME INFUSION BILLING (OUTPATIENT)
Dept: HOME HEALTH SERVICES | Facility: HOME HEALTH | Age: 60
End: 2025-05-14
Payer: COMMERCIAL

## 2025-05-14 ENCOUNTER — VIRTUAL VISIT (OUTPATIENT)
Dept: INTERNAL MEDICINE | Facility: CLINIC | Age: 60
End: 2025-05-14
Payer: COMMERCIAL

## 2025-05-14 ENCOUNTER — HOME INFUSION (OUTPATIENT)
Dept: HOME HEALTH SERVICES | Facility: HOME HEALTH | Age: 60
End: 2025-05-14
Payer: COMMERCIAL

## 2025-05-14 DIAGNOSIS — R25.2 CRAMP OF LIMB: ICD-10-CM

## 2025-05-14 DIAGNOSIS — G89.4 CHRONIC PAIN SYNDROME: ICD-10-CM

## 2025-05-14 DIAGNOSIS — F43.21 SITUATIONAL DEPRESSION: ICD-10-CM

## 2025-05-14 DIAGNOSIS — E60 ZINC DEFICIENCY: ICD-10-CM

## 2025-05-14 DIAGNOSIS — E61.1 IRON DEFICIENCY: Primary | ICD-10-CM

## 2025-05-14 DIAGNOSIS — F51.01 PRIMARY INSOMNIA: ICD-10-CM

## 2025-05-14 DIAGNOSIS — R11.2 NAUSEA AND VOMITING, UNSPECIFIED VOMITING TYPE: ICD-10-CM

## 2025-05-14 PROCEDURE — 98006 SYNCH AUDIO-VIDEO EST MOD 30: CPT | Performed by: INTERNAL MEDICINE

## 2025-05-14 PROCEDURE — A4245 ALCOHOL WIPES PER BOX: HCPCS

## 2025-05-14 PROCEDURE — A4215 STERILE NEEDLE: HCPCS

## 2025-05-14 PROCEDURE — 1125F AMNT PAIN NOTED PAIN PRSNT: CPT | Performed by: INTERNAL MEDICINE

## 2025-05-14 PROCEDURE — A4208 3 CC STERILE SYRINGE&NEEDLE: HCPCS

## 2025-05-14 NOTE — PATIENT INSTRUCTIONS
Thank you for visiting the Primary Care Center today at the [facility]! The following is some information about our clinic:     Primary Care Center Frequently-Asked Questions    (1) How do I schedule appointments at the [facility]?     Primary Care--to schedule or make changes to an existing appointment, please call our primary care line at [phone].    Labs--to schedule a lab appointment at the [facility] you can use [facility] or call [phone]. If you have a [place] location that is closer to home, you can reach out to that location for scheduling options.     Imaging--if you need to schedule a CT, X-ray, MRI, ultrasound, or other imaging study you can call [phone] to schedule at the [facility] or any other [facility] imaging location.     Referrals--if a referral to another specialty was ordered you can expect a phone call from their scheduling team. If you have not heard from them in a week, please call us or send us a Redknee message to check the status or get a scheduling number. Please note that this only applies to internal [facility] referrals. If the referral is external you would need to contact their office for scheduling.     (2) I have a question about my visit, who do I contact?     You can call us at the primary care line at [phone] to ask questions about your visit. You can also send a secure message through Redknee, which is reviewed by clinic staff. Please note that [employer] messages have a twenty-four to forty-eight business hour turnaround time and should not be used for urgent concerns.    (3) How will I get the results of my tests?    If you are signed up for [employer] all tests will be released to you within twenty-four hours of resulting. Please allow three to five days for your doctor to review your results and place a note interpreting the results. If you do not have [employer] you will receive your  results through mail seven to ten business days following the return of the tests. Please note that if there should be any urgent or concerning results that your doctor or their registered nurse will reach out to you the same day as the tests come back. If you have follow up questions about your results or would like to discuss the results in detail please schedule a follow up with your provider either in person or virtually.     (4) How do I get refills of my prescriptions?     You should always first contact your pharmacy for refills of your medications. If submitting a refill request on [employer], please be sure to submit the request only once--repeat requests can cause delays in refill. If you are requesting a NEW medication or a medication related to new symptoms you will need to schedule an appointment with a provider prior to approval. Please note: Routine medication refills have up to one to three business day turnaround whereas controlled substances refills have up to five to seven business day turnaround.    (5) I have new symptoms, what do I do?     If you are having an immediate medical emergency, you should dial 911 for assistance.   For anything urgent that needs to be seen within a few hours to one day you should visit a local urgent care for assistance.  For non-urgent symptoms that need to be seen within a few days to a week you can schedule with an available provider in primary care by going to [facility] or calling [phone].   If you are not sure how serious your symptoms are or you would like to receive medical advice you can always call [phone] to speak with a triage nurse.

## 2025-05-15 ENCOUNTER — TELEPHONE (OUTPATIENT)
Dept: GASTROENTEROLOGY | Facility: CLINIC | Age: 60
End: 2025-05-15
Payer: COMMERCIAL

## 2025-05-15 PROCEDURE — S9542 HT INJ NOC PER DIEM: HCPCS

## 2025-05-15 NOTE — TELEPHONE ENCOUNTER
[employer] messages also sent, message sent to provider and response sent to patient by writer. Awaiting providers response.

## 2025-05-15 NOTE — TELEPHONE ENCOUNTER
[facility] Call Center    Phone Message    May a detailed message be left on voicemail: yes     Reason for Call: Other: Dinora is calling in asking for a call back from her care team. Pt states that she was recommended to reach out to her GI team regarding nausea and abdominal/back pain and low output with her ileostomy. Please call back as soon as possible to discuss.     Action Taken: Message routed to:  [facility] GI    Travel Screening: Not Applicable     Date of Service:

## 2025-05-16 PROCEDURE — S9542 HT INJ NOC PER DIEM: HCPCS

## 2025-05-17 PROCEDURE — S9379 HIT NOC PER DIEM: HCPCS | Mod: SH

## 2025-05-17 PROCEDURE — S9542 HT INJ NOC PER DIEM: HCPCS

## 2025-05-18 PROCEDURE — S9542 HT INJ NOC PER DIEM: HCPCS

## 2025-05-18 PROCEDURE — S9379 HIT NOC PER DIEM: HCPCS | Mod: SH

## 2025-05-19 PROCEDURE — S9379 HIT NOC PER DIEM: HCPCS | Mod: SH

## 2025-05-19 PROCEDURE — S9542 HT INJ NOC PER DIEM: HCPCS

## 2025-05-20 PROCEDURE — S9542 HT INJ NOC PER DIEM: HCPCS

## 2025-05-20 PROCEDURE — S9379 HIT NOC PER DIEM: HCPCS | Mod: SH

## 2025-05-21 ENCOUNTER — HOME INFUSION BILLING (OUTPATIENT)
Dept: HOME HEALTH SERVICES | Facility: HOME HEALTH | Age: 60
End: 2025-05-21
Payer: COMMERCIAL

## 2025-05-21 PROCEDURE — A4208 3 CC STERILE SYRINGE&NEEDLE: HCPCS

## 2025-05-21 PROCEDURE — S9542 HT INJ NOC PER DIEM: HCPCS

## 2025-05-22 ENCOUNTER — RESULTS FOLLOW-UP (OUTPATIENT)
Dept: INTERNAL MEDICINE | Facility: CLINIC | Age: 60
End: 2025-05-22

## 2025-05-22 ENCOUNTER — ANCILLARY PROCEDURE (OUTPATIENT)
Dept: ULTRASOUND IMAGING | Facility: CLINIC | Age: 60
End: 2025-05-22
Attending: INTERNAL MEDICINE
Payer: COMMERCIAL

## 2025-05-22 ENCOUNTER — LAB (OUTPATIENT)
Dept: LAB | Facility: CLINIC | Age: 60
End: 2025-05-22
Payer: COMMERCIAL

## 2025-05-22 DIAGNOSIS — E61.1 IRON DEFICIENCY: ICD-10-CM

## 2025-05-22 DIAGNOSIS — E61.1 IRON DEFICIENCY: Primary | ICD-10-CM

## 2025-05-22 DIAGNOSIS — R93.89 ABNORMAL FINDING ON IMAGING: ICD-10-CM

## 2025-05-22 LAB
ERYTHROCYTE [DISTWIDTH] IN BLOOD BY AUTOMATED COUNT: 21.9 % (ref 10–15)
FERRITIN SERPL-MCNC: 97 NG/ML (ref 11–328)
HCT VFR BLD AUTO: 39.1 % (ref 35–47)
HGB BLD-MCNC: 12.2 G/DL (ref 11.7–15.7)
IRON BINDING CAPACITY (ROCHE): 281 UG/DL (ref 240–430)
IRON SATN MFR SERPL: 16 % (ref 15–46)
IRON SERPL-MCNC: 46 UG/DL (ref 37–145)
MCH RBC QN AUTO: 27.4 PG (ref 26.5–33)
MCHC RBC AUTO-ENTMCNC: 31.2 G/DL (ref 31.5–36.5)
MCV RBC AUTO: 88 FL (ref 78–100)
PLATELET # BLD AUTO: 498 10E3/UL (ref 150–450)
RBC # BLD AUTO: 4.46 10E6/UL (ref 3.8–5.2)
WBC # BLD AUTO: 5.1 10E3/UL (ref 4–11)

## 2025-05-22 PROCEDURE — 93880 EXTRACRANIAL BILAT STUDY: CPT | Mod: GC | Performed by: RADIOLOGY

## 2025-05-22 PROCEDURE — S9542 HT INJ NOC PER DIEM: HCPCS

## 2025-05-23 PROCEDURE — S9542 HT INJ NOC PER DIEM: HCPCS

## 2025-05-24 PROCEDURE — S9542 HT INJ NOC PER DIEM: HCPCS

## 2025-05-25 PROCEDURE — S9542 HT INJ NOC PER DIEM: HCPCS

## 2025-05-26 PROCEDURE — S9542 HT INJ NOC PER DIEM: HCPCS

## 2025-05-27 ENCOUNTER — VIRTUAL VISIT (OUTPATIENT)
Dept: PHARMACY | Facility: CLINIC | Age: 60
End: 2025-05-27
Attending: INTERNAL MEDICINE
Payer: COMMERCIAL

## 2025-05-27 VITALS — WEIGHT: 155 LBS | HEIGHT: 68 IN | BODY MASS INDEX: 23.49 KG/M2

## 2025-05-27 DIAGNOSIS — K31.84 GASTROPARESIS: Primary | ICD-10-CM

## 2025-05-27 DIAGNOSIS — E03.9 HYPOTHYROIDISM, UNSPECIFIED TYPE: ICD-10-CM

## 2025-05-27 DIAGNOSIS — N95.2 ATROPHIC VAGINITIS: ICD-10-CM

## 2025-05-27 DIAGNOSIS — G89.4 CHRONIC PAIN SYNDROME: ICD-10-CM

## 2025-05-27 DIAGNOSIS — G47.00 INSOMNIA: ICD-10-CM

## 2025-05-27 DIAGNOSIS — T78.40XA ALLERGIES: ICD-10-CM

## 2025-05-27 DIAGNOSIS — Z94.83 S/P PANCREATIC ISLET CELL TRANSPLANTATION (H): ICD-10-CM

## 2025-05-27 DIAGNOSIS — G43.709 CHRONIC MIGRAINE WITHOUT AURA WITHOUT STATUS MIGRAINOSUS, NOT INTRACTABLE: ICD-10-CM

## 2025-05-27 PROCEDURE — S9542 HT INJ NOC PER DIEM: HCPCS

## 2025-05-27 RX ORDER — BACLOFEN 10 MG/1
10 TABLET ORAL 3 TIMES DAILY PRN
COMMUNITY

## 2025-05-27 ASSESSMENT — PAIN SCALES - GENERAL: PAINLEVEL_OUTOF10: MODERATE PAIN (5)

## 2025-05-27 NOTE — PROGRESS NOTES
Management (MTM) Encounter    ASSESSMENT:                            Medication Adherence/Access: No issues identified.    Gastroparesis/ GERD:   Confirmed plan to stop pantoprazole and replace it with omeprazole.     S/p pancreas transplantation:  Patient is meeting A1c goal of < 7%. Self monitoring of blood glucose is at goal of average glucose <150mg/dL.  Pt would benefit from meeting with diabetes educator as planned.       Pain Management:   Continue current medications.     Hypothyroidism:   TSH in goal range. Continue current medications.     Allergies:  Continue current medications.     Migraines:   Dinora would benefit from continued follow-up with neurology.     Atrophic vaginitis:   Given Vagifem is not currently controlled symptoms, Dinora would benefit from reviewing alternative therapies with her PCP    Insomnia:   Continue current medications.     PLAN:                            Continue on omeprazole, this is to replace the pantoprazole   Follow up with your primary care provider for alternative treatment options to Vagifem    Follow-up: 1 year with MTM, or sooner as needed    SUBJECTIVE/OBJECTIVE:                          Dinora Mcghee is a 60 year old female seen for a follow-up visit.       Reason for visit: medication check-in.    Allergies/ADRs: Reviewed in chart  Past Medical History: Reviewed in chart  Tobacco: She reports that she quit smoking about 33 years ago. Her smoking use included cigarettes. She started smoking about 39 years ago. She has a 3.2 pack-year smoking history. She has never used smokeless tobacco.    Medication Adherence/Access: no issues reported.    Gastroparesis/ GERD:   Amitriptyline 60 mg daily   Famotidine 20 mg daily  Prochlorperazine 10 mg every 6 hours as needed   Scopolamine patch every 3 days  Omeprazole 40 mg daily   Dronabinol 5 mg twice daily as needed  Lactated ringers daily as needed  Medical cannabis lozenges as needed  Promethazine 25 mg IV every 8 hours  as needed    Dinora now longer has her G or J tube. She still has some swallowing issues after the tube removal but she reports she is able to get meds down. She was recently switched from pantoprazole to omeprazole. She has been on the omeprazole for about a week. Dinora takes dronabinol once or twice daily depending on the amount of nausea she has. She reports it does help with appetite stimulation. Medical cannabis helps with nausea and appetite as well.     S/p pancreas transplantation:  Novolog up to 67 units daily  Glucagon 1 mg as needed  Glucose gel 15-30 g every 15 min as needed  Lantus and Novolog pens as needed for pump failure  Creon 36157 5-7 capsules with meals    Dinora reports that her blood sugars have improved significantly on the insulin pump. She does get occasional low blood sugars down to 40 mg/dL that she does not notice symptoms until after. For low blood sugar she uses glucagon which causes nausea and migraines so she tries to use it only when truly needed. She is meeting with a diabetes educator tomorrow more pump and CGM education.     Fasting blood glucose this mornin mg/dL  Average glucose 2 weeks: 133 mg/dL  Average glucose 1 month: 132 mg/dL  93% of blood sugars in goal range    Hemoglobin A1C   Date Value Ref Range Status   2024 5.4 <5.7 % Final     Comment:     Normal <5.7%   Prediabetes 5.7-6.4%    Diabetes 6.5% or higher     Note: Adopted from ADA consensus guidelines.   2021 5.4 0.0 - 5.7 % Final     Afinion Hemoglobin A1c POCT   Date Value Ref Range Status   2025 5.8 (H) <=5.7 % Final     Comment:     Normal <5.7%   Prediabetes 5.7-6.4%    Diabetes 6.5% or higher     Note: Adopted from ADA consensus guidelines.       Dinora notes that creon has helped significant with abdominal symptoms after eating. She reports eating six small meals per day. She takes 4-7 creon per meal based on what she is eating.     Pain Management:   Tylenol 650 mg every 6 hours as  needed  Baclofen 10 mg three times daily as needed  Lidocaine 5% patch daily as needed  Methocarbamol 750 mg four times daily as needed      Dinora has not needed to use lidocaine patches recently. She reports that the medical cannabis help with pain in addition to nausea and appetite. Baclofen helps more with abdominal pain than methocarbamol.     Hypothyroidism:   Levothyroxine 175 mcg daily    No reported concerns or side effects.     TSH   Date Value Ref Range Status   03/12/2025 1.65 0.30 - 4.20 uIU/mL Final   09/16/2021 0.67 0.40 - 4.00 mU/L Final   09/14/2020 2.41 0.40 - 4.00 mU/L Final     Allergies:  Cetirizine 10 mg daily  Benadryl 25 mg four times daily as needed   Montelukast 10 mg daily    No reported concerns or side effects.    Migraines:   Nurtec ODT 75 mg daily as needed  Zolmitrptan ODT 5 mg daily as needed  Botox 200 units IM every 12 weeks    Dinora reports that she typically uses Nurtec a couple times a week. She does need it daily in the week leading up to her next Botox injection. Zolmitriptan helps with symptoms as well. Denies coverage concerns for medications.     Atrophic vaginitis:   Vagifem 10 mcg twice weekly    Dinora reports she has not seen improvement on the Vagifem. She continues to experience dryness. She also has been using Replens without relief. She plans to address this with her PCP at her next visit.     Insomnia:   Zolpidem 5 mg at bedtime as needed    Dinora reports infrequent use of zolpidem but it is effective when she needs help falling asleep.   ----------------    I spent 21 minutes with this patient today. All changes were made via collaborative practice agreement with Vijaya Genao.     A summary of these recommendations was sent via [employer].    Tania Levi PharmD, BCPS  MTM Pharmacist   [facility]   Phone: [phone]    Telemedicine Visit Details  The patient's medications can be safely assessed via a telemedicine  encounter.  Type of service:  Telephone visit  Originating Location (pt. Location): Home    Distant Location (provider location):  Off-site  Start Time: 1:00 PM  End Time: 1:21 PM     Medication Therapy Recommendations  No medication therapy recommendations to display

## 2025-05-27 NOTE — NURSING NOTE
Patient confirms medications and allergies are accurate via patients echeck in completion, and or denies any changes since last reviewed/verified.     Current patient location: [address] [place] [id number]    Is the patient currently in the state of [place]? YES    Visit mode: TELEPHONE    If the visit is dropped, the patient can be reconnected by:TELEPHONE VISIT: Phone number: [phone]    Will anyone else be joining the visit? NO  (If patient encounters technical issues they should call [phone]    Are changes needed to the allergy or medication list? No    Are refills needed on medications prescribed by this physician? NO    Rooming Documentation:  Questionnaire(s) not done per department protocol    Reason for visit: Consult    Catarina RAMIREZF

## 2025-05-28 ENCOUNTER — VIRTUAL VISIT (OUTPATIENT)
Dept: EDUCATION SERVICES | Facility: CLINIC | Age: 60
End: 2025-05-28
Payer: COMMERCIAL

## 2025-05-28 DIAGNOSIS — E10.9 TYPE 1 DIABETES MELLITUS WITHOUT COMPLICATION (H): Primary | ICD-10-CM

## 2025-05-28 DIAGNOSIS — Z90.410 POST-PANCREATECTOMY DIABETES (H): ICD-10-CM

## 2025-05-28 DIAGNOSIS — E89.1 POST-PANCREATECTOMY DIABETES (H): ICD-10-CM

## 2025-05-28 DIAGNOSIS — E13.9 POST-PANCREATECTOMY DIABETES (H): ICD-10-CM

## 2025-05-28 PROCEDURE — S9542 HT INJ NOC PER DIEM: HCPCS

## 2025-05-28 NOTE — PATIENT INSTRUCTIONS
Ap Farias,    It was nice meeting with you. Here is a summary of our visit and plan:    [employer]: [phone]    [email or web address] can reorder supplies if getting supplies directly from [employer]    PUMP FAILURE/BACKUP PLAN    Cover meals with 1 unit per 20 grams of carbohydrate and correct with 1 unit for every 100 mg/dl greater than 140 mg/dl using Novolog/Aspart or Humalog/Lispro.    Wait four hours before dosing for high blood sugars to avoid stacking. Can cover for food only before the four hour window.    Set an alarm to check blood sugars every 4 hours during the night and cover as needed for blood sugars over 180 mg/dl.    If there is delay in getting your replacement insulin pump, use 6 units of basal insulin, Insulin Glargine U-100  (Lantus, Semglee, Basaglar), once daily until a replacement pump is obtained.       High Blood Sugar (>250mg/dL) Protocol for Insulin Pump Users:    If a single blood sugar reading is above 250 mg/dl, follow these steps:   Immediately take a correction bolus with the pump.    Recheck your blood glucose in 1 hour.  If blood sugar has gone down, your pump is working properly and continue to monitor your sugars as usual.  If your blood glucose is not coming down, follow the steps below closely and entirely:     You need to take an injection of fast acting insulin using a syringe or insulin pen (not using your pump).  You can calculate the amount of insulin using your pump with your current blood glucose. Give the entire calculated correction amount. You may need to round the amount up or down. For example, if the pump says to take 8.8 units, you would round to 9 units on the syringe or pen.  Do not run this amount through your pump, just use it to calculate the number of units to give and then back out. DO NOT give any correction doses of insulin with your pump for the next 3 hours or you will stack your insulin and have a low blood  sugar.    Change out your entire infusion set, tubing and reservoir, being sure to use fresh vial of unopened insulin if possible.  This is a must!  99% of cases with high blood sugars that do not come down after a correction bolus are due to infusion set problems or bent cannulas under the skin.    Check your urine or blood for ketones. If you have moderate or large urine ketones or if blood ketone level is over 0.6 mmol/L:  Endocrinology patients: call your endocrinologist's office for advice on how much extra insulin to take. Do not wait for your physician to get back to you to go ahead with the next steps.    Drink at least a cup of water every hour and limit physical activity.  Recheck your blood glucose and ketones again in 60 minutes.  If your blood sugar still fails to come down, REPORT TO THE NEAREST EMERGENCY ROOM.  If you are not an endocrinology patient: IMMEDIATELY REPORT TO YOUR NEAREST EMERGENCY ROOM FOR ASSISTANCE.         Please call or MyChart if you have any questions.

## 2025-05-28 NOTE — PROGRESS NOTES
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 Remix new kit q24hr. Needs up to 3 kit/week.     glucose 40 % GEL gel Take 15-30 g by mouth every 15 minutes as needed for low blood sugar       Insulin       insulin aspart (NOVOLOG FLEXPEN) 100 UNIT/ML pen Take 2 units with meals OR use insulin carbohydrate ratio 1 unit per 15 grams of carbohydrates three time daily with meals/snacks plus correction scale 1 unit per 50 >140 three times daily before meals and at bedtime (ok to correct >140 at bedtime since TPN is running overnight). Total daily Novolog: ~ 15 units/day     insulin aspart (NOVOLOG VIAL) 100 UNITS/ML vial Uses up to 67 units daily for carb coverage, correction, and priming through insulin pump     insulin glargine (LANTUS PEN) 100 UNIT/ML pen Inject 3 Units subcutaneously daily.     Patient taking differently: Inject 3 Units subcutaneously daily as needed for other.     insulin lispro (HUMALOG LORELEI KWIKPEN) 100 UNIT/ML (0.5 unit dial) KWIKPEN Take 0.5 units per 15 grams of carbohydrate, and correction as directed, up to 20 units per day supply.               Problem Solving:             Patient is at risk of hypoglycemia?: Yes, 1-2 times per week  Hypoglycemia symptoms: shaky, dizzy, weak          Patient's most recent   Lab Results   Component Value Date    A1C 5.8 04/03/2025    A1C 5.4 07/23/2024    A1C  5.6 03/20/2024    A1C 5.4 08/05/2021    A1C 5.7 05/12/2021      Patient's A1C goal: <7.0        EDUCATION and INSTRUCTION PROVIDED AT THIS VISIT:    Patient seen for follow-up after starting Tandem Mobi pump. Using Dexcom G7 sensor. Tandem reports reviewed - Time in Range (70-180mg/dL) is 92%, 0.8% lows, 7.2% time above range. Average glucose is 136 mg/dL. TDD 15.03 units, 32% basal,  68% bolus. Current settings are working well. Having fewer lows. Wearing pump in different places, skin is sensitive to the adhesive. Discussed reordering supplies from Tandem. Reviewed hyperglycemia protocol for pump users and pump back-up plan.    Patient-stated goal written and given to Dinora Mcghee.  Verbalized and demonstrated understanding of instructions.     PLAN:  No changes in pump settings      FOLLOW-UP:    With diabetes education as needed    See patient instructions.  AVS provided to patient.    Time spent with patient at today's visit was 16 minutes.      Any diabetes medication initiation or dose changes were made via the [facility] Standing Orders per the patient's referring provider. A copy of this encounter was shared with the provider.

## 2025-05-28 NOTE — NURSING NOTE
Current patient location: [address] [place] [id number]    Is the patient currently in the state of [place]? YES    Visit mode: VIDEO    If the visit is dropped, the patient can be reconnected by:VIDEO VISIT: Text to cell phone:   Telephone Information:   Mobile [phone]       Will anyone else be joining the visit? NO  (If patient encounters technical issues they should call [phone]    Are changes needed to the allergy or medication list? No    Are refills needed on medications prescribed by this physician? NO    Rooming Documentation:  Not applicable    Reason for visit: Diabetes Education    Komal FLETCHER

## 2025-05-29 ENCOUNTER — HOME INFUSION BILLING (OUTPATIENT)
Dept: HOME HEALTH SERVICES | Facility: HOME HEALTH | Age: 60
End: 2025-05-29
Payer: COMMERCIAL

## 2025-05-29 PROCEDURE — A4215 STERILE NEEDLE: HCPCS

## 2025-05-29 PROCEDURE — S9542 HT INJ NOC PER DIEM: HCPCS

## 2025-05-29 PROCEDURE — A4208 3 CC STERILE SYRINGE&NEEDLE: HCPCS

## 2025-05-29 NOTE — PATIENT INSTRUCTIONS
"Recommendations from today's MTM visit:                                                      Continue on omeprazole, this is to replace the pantoprazole   Follow up with your primary care provider for alternative treatment options to Vagifem    Follow-up: 1 year with MTM, or sooner as needed    It was great speaking with you today.  I value your experience and would be very thankful for your time in providing feedback in our clinic survey. In the next few days, you may receive an email or text message from [facility] with a link to a survey related to your  clinical pharmacist.\"     To schedule another MTM appointment, please call the clinic directly or you may call the MTM scheduling line at [phone].    My Clinical Pharmacist's contact information:                                                      Please feel free to contact me with any questions or concerns you have.      Tania VanceD, BCPS  MTM Pharmacist   [facility]   Phone: [phone]     "

## 2025-05-30 PROCEDURE — S9542 HT INJ NOC PER DIEM: HCPCS

## 2025-05-31 PROCEDURE — S9542 HT INJ NOC PER DIEM: HCPCS

## 2025-06-01 PROCEDURE — S9542 HT INJ NOC PER DIEM: HCPCS

## 2025-06-02 DIAGNOSIS — K21.9 GASTROESOPHAGEAL REFLUX DISEASE WITHOUT ESOPHAGITIS: ICD-10-CM

## 2025-06-02 PROCEDURE — S9542 HT INJ NOC PER DIEM: HCPCS

## 2025-06-03 PROCEDURE — S9542 HT INJ NOC PER DIEM: HCPCS

## 2025-06-04 ENCOUNTER — HOME INFUSION (OUTPATIENT)
Dept: HOME HEALTH SERVICES | Facility: HOME HEALTH | Age: 60
End: 2025-06-04
Payer: COMMERCIAL

## 2025-06-04 RX ORDER — FAMOTIDINE 20 MG/1
20 TABLET, FILM COATED ORAL DAILY
Qty: 90 TABLET | Refills: 1 | Status: SHIPPED | OUTPATIENT
Start: 2025-06-04

## 2025-06-04 NOTE — TELEPHONE ENCOUNTER
Last Written Prescription:  famotidine (PEPCID) 20 MG tablet 90 tablet 1 12/4/2024     ----------------------  Last Visit Date: 5/7/25  Future Visit Date: 9/24/25  ----------------------    Refill decision: Medication refilled per  Medication Refill in Ambulatory Care  policy.    Request from pharmacy:  Requested Prescriptions   Pending Prescriptions Disp Refills    famotidine (PEPCID) 20 MG tablet [Pharmacy Med Name: FAMOTIDINE 20MG TABLETS] 90 tablet 1     Sig: TAKE 1 TABLET(20 MG) BY MOUTH DAILY       H2 Blockers Protocol Passed - 6/4/2025 11:42 AM        Passed - Patient is age 12 or older        Passed - Medication is active on med list and the sig matches. RN to manually verify dose and sig if red X/fail.     If the protocol passes (green check), you do not need to verify med dose and sig.    A prescription matches if they are the same clinical intention.    For Example: once daily and every morning are the same.    The protocol can not identify upper and lower case letters as matching and will fail.     For Example: Take 1 tablet (50 mg) by mouth daily     TAKE 1 TABLET (50 MG) BY MOUTH DAILY    For all fails (red x), verify dose and sig.    If the refill does match what is on file, the RN can still proceed to approve the refill request.       If they do not match, route to the appropriate provider.             Passed - Medication indicated for associated diagnosis     Medication is associated with one or more of the following diagnoses:  Erosive esophagitis - Gastric hypersecretion   Gastric ulcer   Gastroesophageal reflux disease   Indigestion   Ulcer of duodenum   Esophagitis   Gastritis   Gastrointestinal hemorrhage   Stress ulcer; Prophylaxis   Helicobacter pylori gastrointestinal tract infection - Ulcer of duodenum  Zollinger-Gracia syndrome  Cystic Fibrosis  Bronchiectasis            Passed - Recent (12 month) or future (90 days) visit with authorizing provider's specialty (provided they have been  seen in the past 15 months)     The patient must have completed an in-person or virtual visit within the past 12 months or has a future visit scheduled within the next 90 days with the authorizing provider s specialty.  Urgent care and e-visits do not qualify as an office visit for this protocol.

## 2025-06-05 ENCOUNTER — HOME INFUSION BILLING (OUTPATIENT)
Dept: HOME HEALTH SERVICES | Facility: HOME HEALTH | Age: 60
End: 2025-06-05
Payer: COMMERCIAL

## 2025-06-05 PROCEDURE — A4215 STERILE NEEDLE: HCPCS

## 2025-06-05 PROCEDURE — A4208 3 CC STERILE SYRINGE&NEEDLE: HCPCS

## 2025-06-06 PROCEDURE — S9542 HT INJ NOC PER DIEM: HCPCS

## 2025-06-06 PROCEDURE — S9379 HIT NOC PER DIEM: HCPCS | Mod: SH

## 2025-06-07 PROCEDURE — S9379 HIT NOC PER DIEM: HCPCS | Mod: SH

## 2025-06-07 PROCEDURE — S9542 HT INJ NOC PER DIEM: HCPCS

## 2025-06-08 PROCEDURE — S9542 HT INJ NOC PER DIEM: HCPCS

## 2025-06-08 PROCEDURE — S9379 HIT NOC PER DIEM: HCPCS | Mod: SH

## 2025-06-09 PROCEDURE — S9542 HT INJ NOC PER DIEM: HCPCS

## 2025-06-10 PROCEDURE — S9542 HT INJ NOC PER DIEM: HCPCS

## 2025-06-11 ENCOUNTER — HOME INFUSION BILLING (OUTPATIENT)
Dept: HOME HEALTH SERVICES | Facility: HOME HEALTH | Age: 60
End: 2025-06-11
Payer: COMMERCIAL

## 2025-06-11 PROCEDURE — A4215 STERILE NEEDLE: HCPCS

## 2025-06-11 PROCEDURE — A4216 STERILE WATER/SALINE, 10 ML: HCPCS

## 2025-06-11 PROCEDURE — A4208 3 CC STERILE SYRINGE&NEEDLE: HCPCS

## 2025-06-11 PROCEDURE — S9542 HT INJ NOC PER DIEM: HCPCS

## 2025-06-12 PROCEDURE — S9542 HT INJ NOC PER DIEM: HCPCS

## 2025-06-13 PROCEDURE — S9379 HIT NOC PER DIEM: HCPCS | Mod: SH

## 2025-06-13 PROCEDURE — S9542 HT INJ NOC PER DIEM: HCPCS

## 2025-06-14 PROCEDURE — S9542 HT INJ NOC PER DIEM: HCPCS

## 2025-06-14 PROCEDURE — S9379 HIT NOC PER DIEM: HCPCS | Mod: SH

## 2025-06-15 PROCEDURE — S9542 HT INJ NOC PER DIEM: HCPCS

## 2025-06-15 PROCEDURE — S9379 HIT NOC PER DIEM: HCPCS | Mod: SH

## 2025-06-16 PROCEDURE — S9542 HT INJ NOC PER DIEM: HCPCS

## 2025-06-17 PROCEDURE — S9542 HT INJ NOC PER DIEM: HCPCS

## 2025-06-18 ENCOUNTER — HOME INFUSION BILLING (OUTPATIENT)
Dept: HOME HEALTH SERVICES | Facility: HOME HEALTH | Age: 60
End: 2025-06-18
Payer: COMMERCIAL

## 2025-06-18 ENCOUNTER — HOME INFUSION (OUTPATIENT)
Dept: HOME HEALTH SERVICES | Facility: HOME HEALTH | Age: 60
End: 2025-06-18
Payer: COMMERCIAL

## 2025-06-18 PROCEDURE — S9542 HT INJ NOC PER DIEM: HCPCS

## 2025-06-18 PROCEDURE — A4215 STERILE NEEDLE: HCPCS

## 2025-06-18 PROCEDURE — A4208 3 CC STERILE SYRINGE&NEEDLE: HCPCS

## 2025-06-19 PROCEDURE — S9542 HT INJ NOC PER DIEM: HCPCS

## 2025-06-20 PROCEDURE — S9379 HIT NOC PER DIEM: HCPCS | Mod: SH

## 2025-06-20 PROCEDURE — S9542 HT INJ NOC PER DIEM: HCPCS

## 2025-06-20 PROCEDURE — A4216 STERILE WATER/SALINE, 10 ML: HCPCS

## 2025-06-21 PROCEDURE — S9542 HT INJ NOC PER DIEM: HCPCS

## 2025-06-21 PROCEDURE — S9379 HIT NOC PER DIEM: HCPCS | Mod: SH

## 2025-06-22 PROCEDURE — S9542 HT INJ NOC PER DIEM: HCPCS

## 2025-06-23 PROCEDURE — S9542 HT INJ NOC PER DIEM: HCPCS

## 2025-06-24 PROCEDURE — S9542 HT INJ NOC PER DIEM: HCPCS

## 2025-06-25 ENCOUNTER — HOME INFUSION BILLING (OUTPATIENT)
Dept: HOME HEALTH SERVICES | Facility: HOME HEALTH | Age: 60
End: 2025-06-25
Payer: COMMERCIAL

## 2025-06-25 PROCEDURE — S9542 HT INJ NOC PER DIEM: HCPCS

## 2025-06-25 PROCEDURE — A4215 STERILE NEEDLE: HCPCS

## 2025-06-25 PROCEDURE — A4245 ALCOHOL WIPES PER BOX: HCPCS

## 2025-06-25 PROCEDURE — A4208 3 CC STERILE SYRINGE&NEEDLE: HCPCS

## 2025-06-26 ENCOUNTER — TELEPHONE (OUTPATIENT)
Dept: NEUROLOGY | Facility: CLINIC | Age: 60
End: 2025-06-26
Payer: COMMERCIAL

## 2025-06-26 PROCEDURE — S9542 HT INJ NOC PER DIEM: HCPCS

## 2025-06-26 NOTE — TELEPHONE ENCOUNTER
Left Voicemail (1st Attempt) and Sent Mychart (1st Attempt) for the patient to call back and schedule the following:    Appointment type: Botox  Provider: Rachelle العلي  Return date: Oct 2025  Specialty phone number: [phone]  Additional appointment(s) needed: NA  Additonal Notes:

## 2025-06-27 PROCEDURE — S9379 HIT NOC PER DIEM: HCPCS | Mod: SH

## 2025-06-27 PROCEDURE — A4216 STERILE WATER/SALINE, 10 ML: HCPCS

## 2025-06-27 PROCEDURE — S9542 HT INJ NOC PER DIEM: HCPCS

## 2025-06-28 PROCEDURE — A4216 STERILE WATER/SALINE, 10 ML: HCPCS

## 2025-06-28 PROCEDURE — S9542 HT INJ NOC PER DIEM: HCPCS

## 2025-06-28 PROCEDURE — S9379 HIT NOC PER DIEM: HCPCS | Mod: SH

## 2025-06-29 PROCEDURE — S9542 HT INJ NOC PER DIEM: HCPCS

## 2025-06-29 PROCEDURE — A4216 STERILE WATER/SALINE, 10 ML: HCPCS

## 2025-06-30 ENCOUNTER — PATIENT OUTREACH (OUTPATIENT)
Dept: GASTROENTEROLOGY | Facility: CLINIC | Age: 60
End: 2025-06-30
Payer: COMMERCIAL

## 2025-06-30 PROCEDURE — A4216 STERILE WATER/SALINE, 10 ML: HCPCS

## 2025-06-30 PROCEDURE — S9542 HT INJ NOC PER DIEM: HCPCS

## 2025-06-30 NOTE — TELEPHONE ENCOUNTER
"Pt lifting 5lbs weights last week, now pain in her esophagus, down front and back of my check, feels \"tight\", doesn't feel cardiac related. Feels like when she swallows, food gets stuck. Sipping water, feels like its anum g down slowly. Had Schatzki ring stretched in her 20s.     Had some vomiting all day over the weekend, but that's not new, happens on and off. Can sleep through the discomfort, \"inside my chest, radiating, constant, 6/10 pain\" Friday night was the worst.     Currently on double PPI therapy (Pepcid and Priolsec)  has taken a GI cocktail before while inpatient for similar symptoms and it helped. Last EGD October 2024 with Dr. Park:  The examined esophagus was normal.     Per patient,\" I'm a chunky monkey, I'm up to 155lbs! Went for canoe trip recently for the first time in years. \" Overall patient sounds in good spirits but one of her children is getting  Saturday, 7/5, hoping to get this worked up before then.    Note sent to Dr. Park and Dr Genao related to next steps.     famotidine (PEPCID) 20 MG tablet 90 tablet 1 6/4/2025 -- No   Sig - Route: Take 1 tablet (20 mg) by mouth daily. - Oral   Sent to pharmacy as: Famotidine 20 MG Oral Tablet (PEPCID)   Class: E-Prescribe      Disp Refills Start End MARCOS   omeprazole (PRILOSEC) 40 MG DR capsule 90 capsule 3 5/7/2025 -- --   Sig - Route: Take 1 capsule (40 mg) by mouth daily. - Oral   Sent to pharmacy as: Omeprazole 40 MG Oral Capsule Delayed Release (PriLOSEC)   Class: E-Prescribe   Order: [id number]   E-Prescribing Status: Receipt confirmed by pharmacy (5/7/2025 11:12 AM CDT)       "

## 2025-07-01 PROCEDURE — A4216 STERILE WATER/SALINE, 10 ML: HCPCS

## 2025-07-01 PROCEDURE — S9542 HT INJ NOC PER DIEM: HCPCS

## 2025-07-01 NOTE — TELEPHONE ENCOUNTER
Per Dr. Xavier Richard can you work in an EGD and dilation as needed?   If not you, then colleague, under MAC? Gotta be at [facility] of course.   And in meantime, increase to pepcid 40mg at bedtime in addition to PPI       Called Dinora with update to take 40mg pepcid at HS, awaiting more info from Dr Woods team. Pt understands plan    ML

## 2025-07-02 ENCOUNTER — HOME INFUSION BILLING (OUTPATIENT)
Dept: HOME HEALTH SERVICES | Facility: HOME HEALTH | Age: 60
End: 2025-07-02
Payer: COMMERCIAL

## 2025-07-02 PROCEDURE — A4215 STERILE NEEDLE: HCPCS

## 2025-07-02 PROCEDURE — S9542 HT INJ NOC PER DIEM: HCPCS

## 2025-07-02 PROCEDURE — A5120 SKIN BARRIER, WIPE OR SWAB: HCPCS

## 2025-07-02 PROCEDURE — A4216 STERILE WATER/SALINE, 10 ML: HCPCS

## 2025-07-02 PROCEDURE — A4208 3 CC STERILE SYRINGE&NEEDLE: HCPCS

## 2025-07-03 PROCEDURE — S9542 HT INJ NOC PER DIEM: HCPCS

## 2025-07-03 PROCEDURE — A4216 STERILE WATER/SALINE, 10 ML: HCPCS

## 2025-07-04 PROCEDURE — A4216 STERILE WATER/SALINE, 10 ML: HCPCS

## 2025-07-04 PROCEDURE — S9379 HIT NOC PER DIEM: HCPCS | Mod: SH

## 2025-07-04 PROCEDURE — S9542 HT INJ NOC PER DIEM: HCPCS

## 2025-07-05 PROCEDURE — S9379 HIT NOC PER DIEM: HCPCS | Mod: SH

## 2025-07-05 PROCEDURE — S9542 HT INJ NOC PER DIEM: HCPCS

## 2025-07-06 PROCEDURE — S9542 HT INJ NOC PER DIEM: HCPCS

## 2025-07-07 PROCEDURE — S9542 HT INJ NOC PER DIEM: HCPCS

## 2025-07-08 PROCEDURE — S9542 HT INJ NOC PER DIEM: HCPCS

## 2025-07-09 ENCOUNTER — HOME INFUSION BILLING (OUTPATIENT)
Dept: HOME HEALTH SERVICES | Facility: HOME HEALTH | Age: 60
End: 2025-07-09
Payer: COMMERCIAL

## 2025-07-09 DIAGNOSIS — K21.9 GASTROESOPHAGEAL REFLUX DISEASE WITHOUT ESOPHAGITIS: Primary | ICD-10-CM

## 2025-07-09 PROCEDURE — S9542 HT INJ NOC PER DIEM: HCPCS

## 2025-07-09 PROCEDURE — A4208 3 CC STERILE SYRINGE&NEEDLE: HCPCS

## 2025-07-09 PROCEDURE — A5120 SKIN BARRIER, WIPE OR SWAB: HCPCS

## 2025-07-09 PROCEDURE — A4215 STERILE NEEDLE: HCPCS

## 2025-07-10 PROCEDURE — S9542 HT INJ NOC PER DIEM: HCPCS

## 2025-07-11 PROCEDURE — S9542 HT INJ NOC PER DIEM: HCPCS

## 2025-07-11 PROCEDURE — S9379 HIT NOC PER DIEM: HCPCS | Mod: SH

## 2025-07-11 PROCEDURE — A4216 STERILE WATER/SALINE, 10 ML: HCPCS

## 2025-07-12 PROCEDURE — S9542 HT INJ NOC PER DIEM: HCPCS

## 2025-07-12 PROCEDURE — S9379 HIT NOC PER DIEM: HCPCS | Mod: SH

## 2025-07-13 PROCEDURE — S9542 HT INJ NOC PER DIEM: HCPCS

## 2025-07-14 PROCEDURE — S9542 HT INJ NOC PER DIEM: HCPCS

## 2025-07-15 ENCOUNTER — HOME INFUSION (OUTPATIENT)
Dept: HOME HEALTH SERVICES | Facility: HOME HEALTH | Age: 60
End: 2025-07-15
Payer: COMMERCIAL

## 2025-07-15 PROCEDURE — S9542 HT INJ NOC PER DIEM: HCPCS

## 2025-07-16 ENCOUNTER — HOME INFUSION BILLING (OUTPATIENT)
Dept: HOME HEALTH SERVICES | Facility: HOME HEALTH | Age: 60
End: 2025-07-16
Payer: COMMERCIAL

## 2025-07-16 ENCOUNTER — LAB (OUTPATIENT)
Dept: LAB | Facility: CLINIC | Age: 60
End: 2025-07-16
Payer: COMMERCIAL

## 2025-07-16 DIAGNOSIS — E60 ZINC DEFICIENCY: ICD-10-CM

## 2025-07-16 DIAGNOSIS — E61.1 IRON DEFICIENCY: ICD-10-CM

## 2025-07-16 LAB
ERYTHROCYTE [DISTWIDTH] IN BLOOD BY AUTOMATED COUNT: 16.3 % (ref 10–15)
FERRITIN SERPL-MCNC: 76 NG/ML (ref 11–328)
HCT VFR BLD AUTO: 42.4 % (ref 35–47)
HGB BLD-MCNC: 13.3 G/DL (ref 11.7–15.7)
IRON BINDING CAPACITY (ROCHE): 281 UG/DL (ref 240–430)
IRON SATN MFR SERPL: 23 % (ref 15–46)
IRON SERPL-MCNC: 65 UG/DL (ref 37–145)
MCH RBC QN AUTO: 28.6 PG (ref 26.5–33)
MCHC RBC AUTO-ENTMCNC: 31.4 G/DL (ref 31.5–36.5)
MCV RBC AUTO: 91 FL (ref 78–100)
PLATELET # BLD AUTO: 493 10E3/UL (ref 150–450)
RBC # BLD AUTO: 4.65 10E6/UL (ref 3.8–5.2)
WBC # BLD AUTO: 4.5 10E3/UL (ref 4–11)

## 2025-07-16 PROCEDURE — A5120 SKIN BARRIER, WIPE OR SWAB: HCPCS

## 2025-07-16 PROCEDURE — 85027 COMPLETE CBC AUTOMATED: CPT | Performed by: PATHOLOGY

## 2025-07-16 PROCEDURE — A4208 3 CC STERILE SYRINGE&NEEDLE: HCPCS

## 2025-07-16 PROCEDURE — 99000 SPECIMEN HANDLING OFFICE-LAB: CPT | Performed by: PATHOLOGY

## 2025-07-16 PROCEDURE — 83550 IRON BINDING TEST: CPT | Performed by: PATHOLOGY

## 2025-07-16 PROCEDURE — 82728 ASSAY OF FERRITIN: CPT | Performed by: PATHOLOGY

## 2025-07-16 PROCEDURE — 84630 ASSAY OF ZINC: CPT | Mod: 90 | Performed by: PATHOLOGY

## 2025-07-16 PROCEDURE — S9542 HT INJ NOC PER DIEM: HCPCS

## 2025-07-16 PROCEDURE — 36415 COLL VENOUS BLD VENIPUNCTURE: CPT | Performed by: PATHOLOGY

## 2025-07-16 PROCEDURE — 83540 ASSAY OF IRON: CPT | Performed by: PATHOLOGY

## 2025-07-16 PROCEDURE — A4215 STERILE NEEDLE: HCPCS

## 2025-07-17 ENCOUNTER — TELEPHONE (OUTPATIENT)
Dept: GASTROENTEROLOGY | Facility: CLINIC | Age: 60
End: 2025-07-17
Payer: COMMERCIAL

## 2025-07-17 PROCEDURE — S9542 HT INJ NOC PER DIEM: HCPCS

## 2025-07-17 NOTE — TELEPHONE ENCOUNTER
Pre visit planning completed.      Procedure details:    Patient scheduled for Upper endoscopy (EGD) on 7/22/25.     Arrival time: 0745. Procedure time 0845    Facility location: [facility]; [address] [place] [address], 5th Floor, [place] [address]. Check in location: 5th Floor.    Sedation type: MAC    Pre op exam needed? No.    Indication for procedure: GERD      Chart review:     Electronic implanted devices? No    Recent diagnosis of diverticulitis within the last 6 weeks? No      Medication review:    Diabetic? Yes. Insulin: Consult with managing provider.     Anticoagulants? No    Weight loss medication/injectable? No GLP-1 medication per patient's medication list. Nursing to verify with pre-assessment call.    Other medication HOLDING recommendations:  N/A      Prep for procedure:         Procedure information and instructions sent via Repunch         Sweta Weiss RN  Endoscopy Procedure Pre Assessment   [phone] option 3

## 2025-07-17 NOTE — TELEPHONE ENCOUNTER
Pre assessment completed for upcoming procedure.   (Please see previous telephone encounter notes for complete details)      Procedure details:    Procedure date 7/22/25, arrival time 0745 and facility location reviewed.    Pre op exam needed? No.    Designated  policy reviewed. Instructed to have someone stay 24  hours post procedure.       Medication review:    Insulin. Consult with your managing provider.      Prep for procedure:     Procedure prep instructions reviewed.        Any additional information needed:  N/A      Patient verbalized understanding and had no questions or concerns at this time.      Sweta Weiss RN  Endoscopy Procedure Pre Assessment   [phone] option 3

## 2025-07-17 NOTE — TELEPHONE ENCOUNTER
"Endoscopy Scheduling Screen    Caller: patient    Have you had any respiratory illness or flu-like symptoms in the last 10 days?  No    Patient is ACTIVE on ImmuVen.  Inform patient that all appointment instructions will be sent via ImmuVen.    Review patient's insurance for any non participating payor.    Ordering/Referring Provider: WAYNE MAR     (If ordering provider performs procedure, schedule with ordering provider unless otherwise instructed. )    BMI: Estimated body mass index is 23.57 kg/m  as calculated from the following:    Height as of 5/27/25: 1.727 m (5' 8\").    Weight as of 5/27/25: 70.3 kg (155 lb).     Sedation Ordered  MAC/deep sedation.   BMI<= 45 45 < BMI <= 48 48 < BMI < = 50  BMI > 50   No Restrictions No MG ASC  No [facility]  [facility] with exceptions Hospital Only OR Only       Do you have a history of malignant hyperthermia?  No    (Females) Are you currently pregnant?   No     Have you been diagnosed or told you have pulmonary hypertension?   No    Do you have an LVAD?  No    Have you been told you have moderate to severe sleep apnea?  No.    Have you been told you have COPD, asthma, or any other lung disease?  No    Has your doctor ordered any cardiac tests like echo, angiogram, stress test, ablation, or EKG, that you have not completed yet?  No    Do you  have a history of any heart conditions?  No     Have you ever had or are you waiting for an organ transplant?  Yes. Have you had or are on on the wait list for a heart/lung transplant? No, may schedule at all facilities except [facility]    Have you had a stroke or transient ischemic attack (TIA aka \"mini stroke\") in the last 2 years?   No.    Have you been diagnosed with or been told you have cirrhosis of the liver?   No.    Are you currently on dialysis?   No    Do you need assistance transferring?   No    BMI: Estimated body mass index is 23.57 kg/m  as calculated from the following:    Height as of 5/27/25: 1.727 m (5' " "8\").    Weight as of 5/27/25: 70.3 kg (155 lb).     Is patients BMI > 40 and scheduling location [facility]?  No    Do you take an injectable or oral medication for weight loss or diabetes (excluding insulin)?  No    Do you take the medication Naltrexone?  No    Do you take blood thinners?  No       Prep   Are you currently on dialysis or do you have chronic kidney disease?  No    Do you have a diagnosis of diabetes?  Yes (Golytely Prep)    Do you have a diagnosis of cystic fibrosis (CF)?  No    On a regular basis do you go 3 -5 days between bowel movements?  No    BMI > 40?  No    Preferred Pharmacy:    [id number] - [place], [place] - [address]  [facility]  [address] [place] [address]  Phone: [phone] Fax: [phone]    Final Scheduling Details     Procedure scheduled  Upper endoscopy (EGD)    Surgeon:  Silvano     Date of procedure:  8/22     Pre-OP / PAC:   No - Not required for this site.    Location  [facility] - Patient preference.    Sedation   MAC/Deep Sedation - Per order.      Patient Reminders:   You will receive a call from a Nurse to review instructions and health history.  This assessment must be completed prior to your procedure.  Failure to complete the Nurse assessment may result in the procedure being cancelled.      On the day of your procedure, please designate an adult(s) who can drive you home stay with you for the next 24 hours. The medicines used in the exam will make you sleepy. You will not be able to drive.      You cannot take public transportation, ride share services, or non-medical taxi service without a responsible caregiver.  Medical transport services are allowed with the requirement that a responsible caregiver will receive you at your destination.  We require that drivers and caregivers are confirmed prior to your procedure.  "

## 2025-07-18 PROCEDURE — A4216 STERILE WATER/SALINE, 10 ML: HCPCS

## 2025-07-18 PROCEDURE — S9542 HT INJ NOC PER DIEM: HCPCS

## 2025-07-19 PROCEDURE — S9542 HT INJ NOC PER DIEM: HCPCS

## 2025-07-20 PROCEDURE — S9542 HT INJ NOC PER DIEM: HCPCS

## 2025-07-20 ASSESSMENT — HEADACHE IMPACT TEST (HIT 6)
WHEN YOU HAVE A HEADACHE HOW OFTEN DO YOU WISH YOU COULD LIE DOWN: VERY OFTEN
WHEN YOU HAVE HEADACHES HOW OFTEN IS THE PAIN SEVERE: VERY OFTEN
HOW OFTEN DO HEADACHES LIMIT YOUR DAILY ACTIVITIES: SOMETIMES
HOW OFTEN HAVE YOU FELT FED UP OR IRRITATED BECAUSE OF YOUR HEADACHES: RARELY
HOW OFTEN HAVE YOU FELT TOO TIRED TO WORK BECAUSE OF YOUR HEADACHES: SOMETIMES
HOW OFTEN DID HEADACHS LIMIT CONCENTRATION ON WORK OR DAILY ACTIVITY: SOMETIMES
HIT6 TOTAL SCORE: 60

## 2025-07-21 ENCOUNTER — ANESTHESIA EVENT (OUTPATIENT)
Dept: SURGERY | Facility: AMBULATORY SURGERY CENTER | Age: 60
End: 2025-07-21
Payer: COMMERCIAL

## 2025-07-21 ENCOUNTER — OFFICE VISIT (OUTPATIENT)
Dept: NEUROLOGY | Facility: CLINIC | Age: 60
End: 2025-07-21
Payer: COMMERCIAL

## 2025-07-21 DIAGNOSIS — G43.109 MIGRAINE WITH AURA AND WITHOUT STATUS MIGRAINOSUS, NOT INTRACTABLE: ICD-10-CM

## 2025-07-21 DIAGNOSIS — G43.709 CHRONIC MIGRAINE W/O AURA W/O STATUS MIGRAINOSUS, NOT INTRACTABLE: Primary | ICD-10-CM

## 2025-07-21 PROCEDURE — 64615 CHEMODENERV MUSC MIGRAINE: CPT | Performed by: NURSE PRACTITIONER

## 2025-07-21 PROCEDURE — S9542 HT INJ NOC PER DIEM: HCPCS

## 2025-07-21 RX ORDER — CEFUROXIME AXETIL 250 MG/1
6 TABLET ORAL
Qty: 3 KIT | Refills: 11 | Status: SHIPPED | OUTPATIENT
Start: 2025-07-21

## 2025-07-21 NOTE — LETTER
"7/21/2025       RE: Dinora Mcghee  [address] [facility] [address]     Dear Colleague,    Thank you for referring your patient, Dinora Mcghee, to the [facility] [place] at [facility]. Please see a copy of my visit note below.    PROCEDURE NOTE:     [facility]  Botulinum Toxin Procedure     Headache Neurology     07/21/25    Procedure:  OnabotulinumtoxinA injections for chronic migraine  Indication:  Chronic migraine     Dinora suffers from severe intractable headaches.  She was referred by for onabotulinumtoxinA injections for headache.  Risks, benefits, and alternatives were discussed.  All questions were answered and consent given.  She decided to proceed with the injections.      Headache history, from initial consult note: See Initial Headache Clinic visit on 8/3/2022 for details     Prior to initiation of botulinum toxin injections, Ms. Mcghee reported 13-15 headache days per month, with 3-4 severe headache days per month. Her headaches are quite disabling and often interfere with her ability to function normally.     Date of last injections:04/28/25     Ms. Mcghee reports several minor and about 4 severe  migraine headaches in the last week before Botox    She has noticed a wearing off phenomenon prior to this round of botulinum toxin injections, lasting 1 weeks-reports horrible migraines a week before Botox otherwise great     Botulinum toxin injections have improved their functioning: able to read better, walking and not in bed      She has attempted other migraine prophylactic treatments in the past, which have included:   Amitriptyline 30 mg   Topiramate  Sumatriptan   lyrica-\"brain fog\"  depakote -allergies  Compazine +benedryl  Promethazine IV three times per week for   scopalamine patch  zolmitriptan     She currently takes amitriptyline, promethazine, zolmitriptine for headache prevention.     Ms. Mcghee's pain " was assessed prior to the procedure.  She rated her pain today as 2 out of 10.     Procedural Pause: Procedural pause was conducted to verify correct patient identity, procedure to be performed, correct side and site, correct patient position, and special requirements. Appropriate hand hygiene was utilized, and each injection site was prepped with alcohol wipe or Chloraprep swab.      Procedure Details: 200 units of onabotulinumtoxinA was diluted in 4 mL 0.9% normal saline. A total of 200 units of onabotulinumtoxinA were injected using 30 gauge 0.5 in needles into the muscles listed below. 0 units of onabotulinumtoxinA were wasted.         GOAL OF PROCEDURE:  The goal of this procedure is to decrease pain and enhance functional independence.     CONSENT:  The risks, benefits, and treatment options were discussed with the patient who agreed to proceed.     Written consent was obtained      EQUIPMENT USED:  Needles-30 gauge, 0.5 inches for injections  Four 1-ml tuberculin syringes for injections  One sodium chloride 10 ml vial preservative free  Alcohol swabs     SKIN PREPARATION:  Skin preparation was performed using an alcohol wipe.        AREA/MUSCLE INJECTED:  200 units of Botox  Right upper Trapezius (upper cervical) - 10, 10, 5 units of Botox at 3 site/s.   Left upper Trapezius (upper cervical) - 10, 10, 5 units of Botox at 3 site/s.      Right cervical paraspinals - 5 units of Botox at 2 site/s.   Left cervical paraspinals - 5 units of Botox at 2 site/s.      Left occipitalis - 5 units of Botox at 4 site/s+2.5  Right occipitalis -5 units of Botox at 4 site/s+2.5     Right Frontalis - 5 units of Botox at 2 site/s.  Left Frontalis - 5 units of Botox at 2 site/s.     Right Temporalis - 5 units of Botox at 5 site/s.  Left Temporalis - 5 units of Botox at 5 site/s.     Right  - 5 units of Botox at 1 site/s.              Left  - 5 units of Botox at 1 site/s.     Procerus - 5 units of Botox at 1  site/s.     RESPONSE TO PROCEDURE:  tolerated the procedure well and there were no immediate complications.  Patient was allowed to recover for an appropriate period of time and was discharged home in stable condition.      FOLLOW UP:  Sumatriptan inj if needed to try it and stop zolmitriptan. Injectable sumatriptan side effects reviewed  Repeat Botox injections in 12 weeks        This procedure was performed under a hospital privileging agreement with NATALY Bonner, CNP  [facility]    Again, thank you for allowing me to participate in the care of your patient.      Sincerely,    NATALY Hoffman CNP

## 2025-07-21 NOTE — PROGRESS NOTES
"PROCEDURE NOTE:     [facility]  Botulinum Toxin Procedure     Headache Neurology     07/21/25    Procedure:  OnabotulinumtoxinA injections for chronic migraine  Indication:  Chronic migraine     Dinora suffers from severe intractable headaches.  She was referred by for onabotulinumtoxinA injections for headache.  Risks, benefits, and alternatives were discussed.  All questions were answered and consent given.  She decided to proceed with the injections.      Headache history, from initial consult note: See Initial Headache Clinic visit on 8/3/2022 for details     Prior to initiation of botulinum toxin injections, Ms. Mcghee reported 13-15 headache days per month, with 3-4 severe headache days per month. Her headaches are quite disabling and often interfere with her ability to function normally.     Date of last injections:04/28/25     Ms. Mcghee reports several minor and about 4 severe  migraine headaches in the last week before Botox    She has noticed a wearing off phenomenon prior to this round of botulinum toxin injections, lasting 1 weeks-reports horrible migraines a week before Botox otherwise great     Botulinum toxin injections have improved their functioning: able to read better, walking and not in bed      She has attempted other migraine prophylactic treatments in the past, which have included:   Amitriptyline 30 mg   Topiramate  Sumatriptan   lyrica-\"brain fog\"  depakote -allergies  Compazine +benedryl  Promethazine IV three times per week for   scopalamine patch  zolmitriptan     She currently takes amitriptyline, promethazine, zolmitriptine for headache prevention.     Ms. Skinners pain was assessed prior to the procedure.  She rated her pain today as 2 out of 10.     Procedural Pause: Procedural pause was conducted to verify correct patient identity, procedure to be performed, correct side and site, correct patient position, and special requirements. Appropriate hand hygiene was utilized, and " each injection site was prepped with alcohol wipe or Chloraprep swab.      Procedure Details: 200 units of onabotulinumtoxinA was diluted in 4 mL 0.9% normal saline. A total of 200 units of onabotulinumtoxinA were injected using 30 gauge 0.5 in needles into the muscles listed below. 0 units of onabotulinumtoxinA were wasted.         GOAL OF PROCEDURE:  The goal of this procedure is to decrease pain and enhance functional independence.     CONSENT:  The risks, benefits, and treatment options were discussed with the patient who agreed to proceed.     Written consent was obtained      EQUIPMENT USED:  Needles-30 gauge, 0.5 inches for injections  Four 1-ml tuberculin syringes for injections  One sodium chloride 10 ml vial preservative free  Alcohol swabs     SKIN PREPARATION:  Skin preparation was performed using an alcohol wipe.        AREA/MUSCLE INJECTED:  200 units of Botox  Right upper Trapezius (upper cervical) - 10, 10, 5 units of Botox at 3 site/s.   Left upper Trapezius (upper cervical) - 10, 10, 5 units of Botox at 3 site/s.      Right cervical paraspinals - 5 units of Botox at 2 site/s.   Left cervical paraspinals - 5 units of Botox at 2 site/s.      Left occipitalis - 5 units of Botox at 4 site/s+2.5  Right occipitalis -5 units of Botox at 4 site/s+2.5     Right Frontalis - 5 units of Botox at 2 site/s.  Left Frontalis - 5 units of Botox at 2 site/s.     Right Temporalis - 5 units of Botox at 5 site/s.  Left Temporalis - 5 units of Botox at 5 site/s.     Right  - 5 units of Botox at 1 site/s.              Left  - 5 units of Botox at 1 site/s.     Procerus - 5 units of Botox at 1 site/s.     RESPONSE TO PROCEDURE:  tolerated the procedure well and there were no immediate complications.  Patient was allowed to recover for an appropriate period of time and was discharged home in stable condition.      FOLLOW UP:  Sumatriptan inj if needed to try it and stop zolmitriptan. Injectable  sumatriptan side effects reviewed  Repeat Botox injections in 12 weeks        This procedure was performed under a hospital privileging agreement with NATALY Bnoner, CNP  [facility]

## 2025-07-22 ENCOUNTER — HOSPITAL ENCOUNTER (OUTPATIENT)
Facility: AMBULATORY SURGERY CENTER | Age: 60
Discharge: HOME | End: 2025-07-22
Attending: INTERNAL MEDICINE | Admitting: INTERNAL MEDICINE
Payer: COMMERCIAL

## 2025-07-22 ENCOUNTER — ANESTHESIA (OUTPATIENT)
Dept: SURGERY | Facility: AMBULATORY SURGERY CENTER | Age: 60
End: 2025-07-22
Payer: COMMERCIAL

## 2025-07-22 ENCOUNTER — CARE COORDINATION (OUTPATIENT)
Dept: GASTROENTEROLOGY | Facility: CLINIC | Age: 60
End: 2025-07-22

## 2025-07-22 VITALS
HEART RATE: 73 BPM | DIASTOLIC BLOOD PRESSURE: 70 MMHG | WEIGHT: 155 LBS | OXYGEN SATURATION: 99 % | BODY MASS INDEX: 23.49 KG/M2 | SYSTOLIC BLOOD PRESSURE: 119 MMHG | HEIGHT: 68 IN | TEMPERATURE: 97 F | RESPIRATION RATE: 18 BRPM

## 2025-07-22 VITALS — HEART RATE: 88 BPM

## 2025-07-22 DIAGNOSIS — B37.81 CANDIDA ESOPHAGITIS (H): Primary | ICD-10-CM

## 2025-07-22 LAB
GLUCOSE BLDC GLUCOMTR-MCNC: 90 MG/DL (ref 70–99)
UPPER GI ENDOSCOPY: NORMAL

## 2025-07-22 PROCEDURE — 88305 TISSUE EXAM BY PATHOLOGIST: CPT | Mod: 26 | Performed by: PATHOLOGY

## 2025-07-22 PROCEDURE — S9542 HT INJ NOC PER DIEM: HCPCS

## 2025-07-22 PROCEDURE — 88342 IMHCHEM/IMCYTCHM 1ST ANTB: CPT | Mod: 26 | Performed by: PATHOLOGY

## 2025-07-22 PROCEDURE — 88342 IMHCHEM/IMCYTCHM 1ST ANTB: CPT | Mod: TC | Performed by: INTERNAL MEDICINE

## 2025-07-22 RX ORDER — NALOXONE HYDROCHLORIDE 0.4 MG/ML
0.4 INJECTION, SOLUTION INTRAMUSCULAR; INTRAVENOUS; SUBCUTANEOUS
Status: DISCONTINUED | OUTPATIENT
Start: 2025-07-22 | End: 2025-07-23 | Stop reason: HOSPADM

## 2025-07-22 RX ORDER — LIDOCAINE HYDROCHLORIDE 20 MG/ML
INJECTION, SOLUTION INFILTRATION; PERINEURAL PRN
Status: DISCONTINUED | OUTPATIENT
Start: 2025-07-22 | End: 2025-07-22

## 2025-07-22 RX ORDER — SODIUM CHLORIDE, SODIUM LACTATE, POTASSIUM CHLORIDE, CALCIUM CHLORIDE 600; 310; 30; 20 MG/100ML; MG/100ML; MG/100ML; MG/100ML
INJECTION, SOLUTION INTRAVENOUS CONTINUOUS
Status: DISCONTINUED | OUTPATIENT
Start: 2025-07-22 | End: 2025-07-23 | Stop reason: HOSPADM

## 2025-07-22 RX ORDER — PROPOFOL 10 MG/ML
INJECTION, EMULSION INTRAVENOUS CONTINUOUS PRN
Status: DISCONTINUED | OUTPATIENT
Start: 2025-07-22 | End: 2025-07-22

## 2025-07-22 RX ORDER — LIDOCAINE 40 MG/G
CREAM TOPICAL
Status: DISCONTINUED | OUTPATIENT
Start: 2025-07-22 | End: 2025-07-22 | Stop reason: HOSPADM

## 2025-07-22 RX ORDER — PROCHLORPERAZINE MALEATE 10 MG
10 TABLET ORAL EVERY 6 HOURS PRN
Status: DISCONTINUED | OUTPATIENT
Start: 2025-07-22 | End: 2025-07-23 | Stop reason: HOSPADM

## 2025-07-22 RX ORDER — SCOPOLAMINE 1 MG/3D
1 PATCH, EXTENDED RELEASE TRANSDERMAL ONCE
Status: DISCONTINUED | OUTPATIENT
Start: 2025-07-22 | End: 2025-07-23 | Stop reason: HOSPADM

## 2025-07-22 RX ORDER — FLUCONAZOLE 200 MG/1
TABLET ORAL
Qty: 15 TABLET | Refills: 0 | Status: SHIPPED | OUTPATIENT
Start: 2025-07-22

## 2025-07-22 RX ORDER — PROPOFOL 10 MG/ML
INJECTION, EMULSION INTRAVENOUS PRN
Status: DISCONTINUED | OUTPATIENT
Start: 2025-07-22 | End: 2025-07-22

## 2025-07-22 RX ORDER — HEPARIN SODIUM (PORCINE) LOCK FLUSH IV SOLN 100 UNIT/ML 100 UNIT/ML
5-10 SOLUTION INTRAVENOUS
Status: DISCONTINUED | OUTPATIENT
Start: 2025-07-22 | End: 2025-07-23 | Stop reason: HOSPADM

## 2025-07-22 RX ORDER — FLUMAZENIL 0.1 MG/ML
0.2 INJECTION, SOLUTION INTRAVENOUS
Status: ACTIVE | OUTPATIENT
Start: 2025-07-22 | End: 2025-07-22

## 2025-07-22 RX ORDER — HEPARIN SODIUM,PORCINE 10 UNIT/ML
5-10 VIAL (ML) INTRAVENOUS
Status: DISCONTINUED | OUTPATIENT
Start: 2025-07-22 | End: 2025-07-23 | Stop reason: HOSPADM

## 2025-07-22 RX ORDER — HEPARIN SODIUM,PORCINE 10 UNIT/ML
5-10 VIAL (ML) INTRAVENOUS EVERY 24 HOURS
Status: DISCONTINUED | OUTPATIENT
Start: 2025-07-22 | End: 2025-07-23 | Stop reason: HOSPADM

## 2025-07-22 RX ORDER — NALOXONE HYDROCHLORIDE 0.4 MG/ML
0.2 INJECTION, SOLUTION INTRAMUSCULAR; INTRAVENOUS; SUBCUTANEOUS
Status: DISCONTINUED | OUTPATIENT
Start: 2025-07-22 | End: 2025-07-23 | Stop reason: HOSPADM

## 2025-07-22 RX ORDER — FLUCONAZOLE 200 MG/1
TABLET ORAL
Qty: 15 TABLET | Refills: 0 | Status: SHIPPED | OUTPATIENT
Start: 2025-07-22 | End: 2025-07-22

## 2025-07-22 RX ADMIN — SODIUM CHLORIDE, SODIUM LACTATE, POTASSIUM CHLORIDE, CALCIUM CHLORIDE: 600; 310; 30; 20 INJECTION, SOLUTION INTRAVENOUS at 08:02

## 2025-07-22 RX ADMIN — PROPOFOL 250 MCG/KG/MIN: 10 INJECTION, EMULSION INTRAVENOUS at 09:06

## 2025-07-22 RX ADMIN — PROPOFOL 50 MG: 10 INJECTION, EMULSION INTRAVENOUS at 09:09

## 2025-07-22 RX ADMIN — LIDOCAINE HYDROCHLORIDE 40 MG: 20 INJECTION, SOLUTION INFILTRATION; PERINEURAL at 09:05

## 2025-07-22 RX ADMIN — PROPOFOL 100 MG: 10 INJECTION, EMULSION INTRAVENOUS at 09:05

## 2025-07-22 RX ADMIN — SCOPOLAMINE 1 PATCH: 1 PATCH, EXTENDED RELEASE TRANSDERMAL at 08:15

## 2025-07-22 ASSESSMENT — ENCOUNTER SYMPTOMS: ORTHOPNEA: 0

## 2025-07-22 ASSESSMENT — LIFESTYLE VARIABLES: TOBACCO_USE: 0

## 2025-07-22 ASSESSMENT — COPD QUESTIONNAIRES: COPD: 0

## 2025-07-22 NOTE — ANESTHESIA CARE TRANSFER NOTE
Patient: Dinora Mcghee    Procedure: Procedure(s):  Esophagoscopy, gastroscopy, duodenoscopy (EGD), combined with biopsy and balloon dilation       Diagnosis: Gastroesophageal reflux disease without esophagitis [K21.9]  Diagnosis Additional Information: No value filed.    Anesthesia Type:   MAC     Note:    Oropharynx: oropharynx clear of all foreign objects and spontaneously breathing  Level of Consciousness: awake  Oxygen Supplementation: room air  Level of Supplemental Oxygen (L/min / FiO2): 0  Independent Airway: airway patency satisfactory and stable  Dentition: dentition unchanged  Vital Signs Stable: post-procedure vital signs reviewed and stable  Report to RN Given: handoff report given  Patient transferred to: Phase II  Comments: Uneventful transport   Report to RN - Renetta  Pt comfortable  Exchanging well; color natl  Pt responds appropriately to command  IV patent  Lips/teeth/dentition as preop status  Questions answered      Handoff Report: Identifed the Patient, Identified the Reponsible Provider, Reviewed the pertinent medical history, Discussed the surgical course, Reviewed Intra-OP anesthesia mangement and issues during anesthesia, Set expectations for post-procedure period and Allowed opportunity for questions and acknowledgement of understanding      Vitals:  Vitals Value Taken Time   /68 07/22/25 09:34   Temp 35.6  C (96  F) 07/22/25 09:34   Pulse 83 07/22/25 09:34   Resp 16 07/22/25 09:34   SpO2 100 % 07/22/25 09:34       Electronically Signed By: NATALY TRACEY CRNA  July 22, 2025  9:35 AM

## 2025-07-22 NOTE — ANESTHESIA POSTPROCEDURE EVALUATION
Patient: Dinora Mcghee    Procedure: Procedure(s):  Esophagoscopy, gastroscopy, duodenoscopy (EGD), combined with biopsy and balloon dilation       Anesthesia Type:  MAC    Note:  Disposition: Outpatient   Postop Pain Control: Uneventful            Sign Out: Well controlled pain   PONV: No   Neuro/Psych: Uneventful            Sign Out: Acceptable/Baseline neuro status   Airway/Respiratory: Uneventful            Sign Out: Acceptable/Baseline resp. status   CV/Hemodynamics: Uneventful            Sign Out: Acceptable CV status; No obvious hypovolemia; No obvious fluid overload   Other NRE:    DID A NON-ROUTINE EVENT OCCUR?     Event details/Postop Comments:  I checked on Dinora in recovery.  at bedside. She was alert and oriented. She reported no awareness during the procedure and was happy with her anesthetic care.            Last vitals:  Vitals Value Taken Time   /70 07/22/25 10:00   Temp 36.1  C (97  F) 07/22/25 10:00   Pulse 73 07/22/25 10:00   Resp 18 07/22/25 10:00   SpO2 99 % 07/22/25 10:04   Vitals shown include unfiled device data.    Electronically Signed By: Yuliya Cook MD  July 22, 2025  11:52 AM

## 2025-07-22 NOTE — H&P
Dinora GAINES [facility] [place]  [id number]  female  60 year old      Reason for procedure/surgery: GERD and dysphagia    Patient Active Problem List   Diagnosis    Hypothyroidism    Need for prophylactic immunotherapy    Sprain and strain of other specified sites of hip and thigh    Chondromalacia of patella    Sensorineural hearing loss    Vertiginous syndrome and labyrinthine disorder    Lumbago    Allergic rhinitis due to other allergen    GERD (gastroesophageal reflux disease)    Other type of intractable migraine    History of ERCP    Abdominal pain    S/P ERCP    Post-pancreatectomy diabetes (H)    Pancreatic insufficiency    Gastroparesis    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    Incisional hernia, without obstruction or gangrene    Adhesive capsulitis of shoulder, unspecified laterality    S/P hernia repair    Headache, chronic migraine without aura    Chronic pain syndrome    Iron deficiency anemia    Hypoglycemia    Adult failure to thrive    Abdominal pain, generalized    Gastrostomy tube obstruction (H)    Intra-abdominal abscess (H)    Postprocedural intraabdominal abscess (H)    Acquired absence of spleen    Depressive disorder    Diabetes insipidus    Other specified abnormal immunological findings in serum    Poisoning by drug    Sphincter of Oddi dysfunction    Type 1 diabetes mellitus without complication (H)    Myofascial pain    Feeding intolerance    Acute postoperative abdominal pain    Major depression, single episode    History of food intolerance    Malnutrition, unspecified type    Nausea and vomiting, unspecified vomiting type    On total parenteral nutrition (TPN)    Fever, unspecified fever cause    Chronic pancreatitis, unspecified pancreatitis type (H)    Cholangitis (H)    Abdominal pain, unspecified abdominal location    Bacteremia    Constipation    Right upper quadrant abdominal pain    S/P pancreatic islet cell transplantation (H)    Dislodged jejunostomy tube       Past Surgical History:    Past  Surgical History:   Procedure Laterality Date    ABDOMEN SURGERY  1999    c sections: 93, 96, 98. endometriosis growth    APPENDECTOMY       SECTION  1993    CHOLECYSTECTOMY      COLONOSCOPY  2014    COLONOSCOPY N/A 2023    Procedure: COLONOSCOPY, WITH POLYPECTOMY AND BIOPSY;  Surgeon: Percy Chamorro MD;  [facility]    ENDOSCOPIC INSERTION TUBE GASTROSTOMY N/A 2021    Procedure: Gastrojejonostomy placement with jejunopexy, PEG tube exchange;  Surgeon: Zackery Montoya MD;  [facility]    ENDOSCOPIC RETROGRADE CHOLANGIOPANCREATOGRAM N/A 2015    Procedure: ENDOSCOPIC RETROGRADE CHOLANGIOPANCREATOGRAM;  Surgeon: Mandeep Park MD;  [facility]    ENDOSCOPIC RETROGRADE CHOLANGIOPANCREATOGRAM N/A 2016    Procedure: COMBINED ENDOSCOPIC RETROGRADE CHOLANGIOPANCREATOGRAPHY, PLACE TUBE/STENT;  Surgeon: Mandeep Park MD;  [facility]    ENDOSCOPIC RETROGRADE CHOLANGIOPANCREATOGRAM N/A 2016    Procedure: COMBINED ENDOSCOPIC RETROGRADE CHOLANGIOPANCREATOGRAPHY, REMOVE FOREIGN BODY OR STENT/TUBE;  Surgeon: Mandeep Park MD;  [facility]    ENDOSCOPIC RETROGRADE CHOLANGIOPANCREATOGRAM N/A 2016    Procedure: COMBINED ENDOSCOPIC RETROGRADE CHOLANGIOPANCREATOGRAPHY, PLACE TUBE/STENT;  Surgeon: Mandeep Park MD;  [facility]    ENDOSCOPIC RETROGRADE CHOLANGIOPANCREATOGRAM N/A 2016    Procedure: COMBINED ENDOSCOPIC RETROGRADE CHOLANGIOPANCREATOGRAPHY, REMOVE FOREIGN BODY OR STENT/TUBE;  Surgeon: Mandeep Park MD;  [facility]    ENDOSCOPIC ULTRASOUND UPPER GASTROINTESTINAL TRACT (GI) N/A 10/03/2016    Procedure: ENDOSCOPIC ULTRASOUND, ESOPHAGOSCOPY / UPPER GASTROINTESTINAL TRACT (GI);  Surgeon: Guru Jose Rodas MD;  [facility]    ENT SURGERY      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    ENTEROSCOPY SMALL BOWEL N/A 2022    Procedure:  ENTEROSCOPY with possible fistula closure;  Surgeon: Francisco Dodson MD;  Location:  GI    ENTEROSCOPY SMALL BOWEL N/A 09/11/2024    Procedure: Upper endoscopy, gastrostomy tube placement and jejunostomy tube exchange;  Surgeon: Zackery Montoya MD;  [facility]    ESOPHAGOSCOPY, GASTROSCOPY, DUODENOSCOPY (EGD), COMBINED N/A 06/24/2015    Procedure: COMBINED ESOPHAGOSCOPY, GASTROSCOPY, DUODENOSCOPY (EGD), REMOVE FOREIGN BODY;  Surgeon: Mandeep Park MD;  [facility]    ESOPHAGOSCOPY, GASTROSCOPY, DUODENOSCOPY (EGD), COMBINED N/A 10/25/2015    Procedure: COMBINED ESOPHAGOSCOPY, GASTROSCOPY, DUODENOSCOPY (EGD);  Surgeon: Sammy Amaro MD;  [facility]    ESOPHAGOSCOPY, GASTROSCOPY, DUODENOSCOPY (EGD), COMBINED N/A 10/25/2015    Procedure: COMBINED ESOPHAGOSCOPY, GASTROSCOPY, DUODENOSCOPY (EGD), BIOPSY SINGLE OR MULTIPLE;  Surgeon: Sammy Amaro MD;  [facility]    ESOPHAGOSCOPY, GASTROSCOPY, DUODENOSCOPY (EGD), COMBINED N/A 01/31/2023    Procedure: ESOPHAGOGASTRODUODENOSCOPY (EGD) with peg replacement ;  Surgeon: Mandeep Park MD;  [facility]    ESOPHAGOSCOPY, GASTROSCOPY, DUODENOSCOPY (EGD), COMBINED N/A 07/24/2024    Procedure: Esophagoscopy, gastroscopy, duodenoscopy (EGD), combined;  Surgeon: Zackery Montoya MD;  [facility]    ESOPHAGOSCOPY, GASTROSCOPY, DUODENOSCOPY (EGD), COMBINED N/A 10/31/2024    Procedure: Esophagoscopy, gastroscopy, duodenoscopy (EGD), combined;  Surgeon: Mandeep Park MD;  [facility]    ESOPHAGOSCOPY, GASTROSCOPY, DUODENOSCOPY (EGD), DILATATION, COMBINED      EXCISE LESION TRUNK N/A 04/17/2017    Procedure: EXCISE LESION TRUNK;  Removal of Abdominal Foreign Body;  Surgeon: Nestor Phoenix MD;  [facility]    GENITOURINARY SURGERY  1999    Hysterectomy    HC ESOPH/GAS REFLUX TEST W NASAL IMPED >1 HR N/A 11/19/2015    Procedure: ESOPHAGEAL IMPEDENCE FUNCTION TEST WITH 24 HOUR PH GREATER THAN 1 HOUR;   Surgeon: Thiago Apple MD;  [facility]     UGI ENDOSCOPY DIAG W BIOPSY  09/17/2008     UG[facility] ENDOSCOPY DIAG W BIOPSY  09/27/2012    HC UGI ENDOSCOPY W ESOPHAGEAL DILATION BALLOON <30MM  09/17/2008    HC UGI ENDOSCOPY W EUS N/A 05/05/2015    Procedure: COMBINED ENDOSCOPIC ULTRASOUND, ESOPHAGOSCOPY, GASTROSCOPY, DUODENOSCOPY (EGD);  Surgeon: Wm Dueñas MD;  [facility]    HC WRIST ARTHROSCOP,RELEASE XVERS LIG Bilateral 12/17/2008    INJECT TRANSVERSUS ABDOMINIS PLANE (TAP) BLOCK BILATERAL Left 09/22/2016    Procedure: INJECT TRANSVERSUS ABDOMINIS PLANE (TAP) BLOCK BILATERAL;  Surgeon: Dickson Corrigan MD;  [facility]    INJECT TRIGGER POINT Bilateral 09/08/2022    Procedure: Abdominal trigger point injection with ultrasound;  Surgeon: Monika Mahajan MD;  [facility]    INJECT TRIGGER POINT SINGLE / MULTIPLE 3 OR MORE MUSCLES Right 11/15/2022    Procedure: Trigger point injections right abdomen with ultrasound guidance;  Surgeon: Monika Mahajan MD;  Location: [facility]    IR CHEST PORT PLACEMENT < 5 YRS OF AGE  06/10/2022    IR CHEST PORT PLACEMENT > 5 YRS OF AGE  01/29/2025    IR CVC TUNNEL PLACEMENT > 5 YRS OF AGE  04/15/2024    IR CVC TUNNEL REMOVAL RIGHT  01/29/2025    IR PORT REMOVAL RIGHT  11/09/2023    LAPAROSCOPIC ASSISTED COLECTOMY N/A 10/11/2024    Procedure: Exploratory laparotomy, extensive lysis of adhesions, revision of Jtube and small bowel resection, TOTAL ABDOMINAL COLECTOMY WITH ILEORECTAL ANASTAMOSIS, DIVERTING LOOP ILEOSTOMY, flexible sigmoidoscopy;  Surgeon: Kaylene Branch MD;  [facility]    laparoscopic pineda  01/01/1995    LAPAROSCOPIC HERNIORRHAPHY INCISIONAL N/A 08/23/2018    Procedure: LAPAROSCOPIC HERNIORRHAPHY INCISIONAL;  Laparoscopic Incisional Hernia Repair with Symbotex Mesh Implant;  Surgeon: Nestor Phoenix MD;  [facility]    LAPAROSCOPIC PANCREATECTOMY, TRANSPLANT AUTO ISLET CELL N/A 12/28/2016    Procedure: LAPAROSCOPIC  PANCREATECTOMY, TRANSPLANT AUTO ISLET CELL;  Surgeon: Nestor Phoenix MD;  [facility]    LAPAROTOMY EXPLORATORY N/A 01/31/2023    Procedure: Exploratory Laparotomy, lysis of adhesions, Perforated J-Junostomy Resection, Replace J-Junostomy site;  Surgeon: Elver Bauer MD;  [facility]    LAPAROTOMY, LYSIS ADHESIONS, COMBINED N/A 01/31/2023    Procedure: Dilatation of jejunostomy feeding tube, track with placement of jejunostomy tube with fluoroscopy;  Surgeon: Elver Bauer MD;  [facility]    PICC DOUBLE LUMEN PLACEMENT Left 11/13/2023    Basilic Vein 5F DL 43 cm    REMOVE AND REPLACE BREAST IMPLANT PROSTHESIS N/A 12/30/2022    Procedure: Exploratory Laparotomy, lysis of adhesions, small bowel resection. Placement of gastric jejunostomy for enteral feeding.;  Surgeon: Elver Bauer MD;  [facility]    REMOVE GASTROSTOMY TUBE ADULT N/A 01/28/2022    Procedure: REMOVAL, GASTROSTOMY TUBE, ADULT;  Surgeon: Mandeep Park MD;  [facility]    REMOVE GASTROSTOMY TUBE ADULT N/A 12/02/2024    Procedure: Remove gastrostomy tube adult- PEG;  Surgeon: Mandeep Park MD;  [facility]    REPLACE GASTROJEJUNOSTOMY TUBE, PERCUTANEOUS N/A 09/07/2021    Procedure: GASTROJEJUNOSTOMY TUBE PLACEMENT, PERCUTANEOUS, WITH GASTROPEXY;  Surgeon: Mandeep Park MD;  [facility]    REPLACE GASTROJEJUNOSTOMY TUBE, PERCUTANEOUS N/A 09/22/2021    Procedure: REPLACEMENT, GASTROJEJUNOSTOMY TUBE, PERCUTANEOUS;  Surgeon: Zackery Montoya MD;  [facility]    REPLACE GASTROJEJUNOSTOMY TUBE, PERCUTANEOUS N/A 11/22/2022    Procedure: REPLACEMENT, GASTROJEJUNOSTOMY TUBE, PERCUTANEOUS;  Surgeon: Mandeep Park MD;  [facility]    REPLACE GASTROJEJUNOSTOMY TUBE, PERCUTANEOUS N/A 12/09/2022    Procedure: REPLACEMENT, GASTROJEJUNOSTOMY TUBE, PERCUTANEOUS with ENDOSCOPIC SUTURING.;  Surgeon: Mandeep Park MD;  [facility]    REPLACE  GASTROJEJUNOSTOMY TUBE, PERCUTANEOUS N/A 2023    Procedure: Replace Gastrojejunostomy Tube, Percutaneous;  Surgeon: Mandeep Park MD;  [facility]    REPLACE GASTROJEJUNOSTOMY TUBE, PERCUTANEOUS N/A 2023    Procedure: Replace Gastrojejunostomy Tube, Percutaneous;  Surgeon: Francisco Dodson MD;  [facility]    REPLACE GASTROJEJUNOSTOMY TUBE, PERCUTANEOUS N/A 2023    Procedure: Replace Gastrojejunostomy Tube, Percutaneous;  Surgeon: Mandeep Park MD;  [facility]    REPLACE GASTROSTOMY TUBE, PERCUTANEOUS N/A 10/31/2024    Procedure: REPLACEMENT, GASTROSTOMY TUBE, PERCUTANEOUS;  Surgeon: Mandeep Park MD;  [facility]    REPLACE JEJUNOSTOMY TUBE, PERCUTANEOUS N/A 09/10/2021    Procedure: UPPER ENDOSCOPY, REPLACEMENT OF PERCUTANEOUS GASTROJEJUNOSTOMY TUBE, T-TAG GASTROPEXY;  Surgeon: Zackery Montoya MD;  [facility]    REPLACE JEJUNOSTOMY TUBE, PERCUTANEOUS N/A 2021    Procedure: REPLACEMENT, JEJUNOSTOMY TUBE, PERCUTANEOUS;  Surgeon: Mandeep Park MD;  [facility]    REPLACE JEJUNOSTOMY TUBE, PERCUTANEOUS N/A 2023    Procedure: REPLACEMENT, JEJUNOSTOMY TUBE, PERCUTANEOUS;  Surgeon: Mandeep Park MD;  [facility]    REPLACE JEJUNOSTOMY TUBE, PERCUTANEOUS  2023    Procedure: Replace Jejunostomy Tube, Percutaneous;  Surgeon: Mandeep Park MD;  [facility]    REPLACE JEJUNOSTOMY TUBE, PERCUTANEOUS N/A 2024    Procedure: REPLACEMENT, JEJUNOSTOMY TUBE, PERCUTANEOUS;  Surgeon: Francisco Dodson MD;  [facility]    REPLACE JEJUNOSTOMY TUBE, PERCUTANEOUS N/A 10/31/2024    Procedure: REPLACEMENT, JEJUNOSTOMY TUBE, PERCUTANEOUS;  Surgeon: Mandeep Park MD;  [facility]    transphenoidal pituitary resection  1990    [facility]  DELIVERY ONLY  1996    [facility]  DELIVERY ONLY  1998    repeat c section with incidental cystotomy with repair    ZZC EXCIS  PITUITARY,TRANSNASAL/SEPTAL  1980    pituitary tumor removed for diabetes insipidus    ZZC TOTAL ABDOM HYSTERECTOMY  1999    w/ bilateral salpingoophorectomy        Past Medical History:   Past Medical History:   Diagnosis Date    Allergic rhinitis, cause unspecified     Allergy to other foods     strawberries, apples, celeries, alice, watermelon    Arthritis     left knee    Choledocholithiasis     long after cholecystectomy    Chronic abdominal pain     Chronic constipation     Chronic nausea     Chronic pancreatitis (H)     Degeneration of lumbar or lumbosacral intervertebral disc     Depressive disorder     Situational. Would like to try weaning off Cymbalta    Esophageal reflux     w/ hiatal hernia    Gastroparesis     Hiatal hernia     History of pituitary adenoma     s/p resection    Hypothyroidism     Migraines     Mild hyperlipidemia     On tube feeding diet     presence of GJ tube    Pancreatic disease     PD stricture, 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     PONV (postoperative nausea and vomiting)     Portacath in place     Type 1 diabetes mellitus without complication (H) 2022    Unspecified hearing loss     25% high frequency R       Social History:   Social History     Tobacco Use    Smoking status: Former     Current packs/day: 0.00     Average packs/day: 0.5 packs/day for 6.3 years (3.2 ttl pk-yrs)     Types: Cigarettes     Start date: 1985     Quit date: 1992     Years since quittin.5    Smokeless tobacco: Never    Tobacco comments:     no 2nd hand   Substance Use Topics    Alcohol use: Not Currently     Alcohol/week: 3.0 - 6.0 standard drinks of alcohol     Types: 1 - 2 Glasses of wine, 1 - 2 Cans of beer, 1 - 2 Shots of liquor per week     Comment: none since IGG       Family History:   Family History   Problem Relation Age of Onset    Lipids Mother     Hypertension Mother     Thyroid Disease Mother     Depression Mother     Angina Mother     GERD Mother      Skin Cancer Mother     Migraines Mother     Autoimmune Disease Mother     Hyperlipidemia Mother     Mental Illness Mother     Cerebrovascular Disease Mother         2023    Other Cancer Mother         skin-basil cell    Anxiety Disorder Mother     Post Operative Nausea and Vomiting Mother     Eye Disorder Father         cataract, detached retina    Myocardial Infarction Father 60    Lipids Father     Cerebrovascular Disease Father     Depression Father     Substance Abuse Father     Anesthesia Reaction Father         stroke right after surgery    Cataracts Father     Osteoarthritis Father     Ulcerative Colitis Father     Autoimmune Disease Father     Heart Disease Father     Hyperlipidemia Father     Mental Illness Father     Other Cancer Father         myloma    Anxiety Disorder Father     Interpersonal Violence Sister     Coronary Artery Disease Sister         angioplasty    GERD Sister     Substance Abuse Sister     Cerebrovascular Disease Sister             Depression Sister     Thyroid Disease Sister     Autoimmune Disease Sister     Depression Sister     Mental Illness Sister     Substance Abuse Sister     Thyroid Disease Sister     Eye Disorder Maternal Grandmother         cataract    Thyroid Disease Maternal Grandmother          of stroke at 81    Diabetes Maternal Grandfather     Eye Disorder Paternal Grandmother         cataract    Diabetes Paternal Grandmother     Eye Disorder Paternal Grandfather         cataract    Diabetes Paternal Grandfather     Substance Abuse Paternal Grandfather     Eye Disorder Son         ptosis    Depression Son     Anxiety Disorder Son     Depression Son     Mental Illness Son     Anxiety Disorder Son     Heart Disease Paternal Aunt     Diabetes Paternal Aunt     Diabetes Paternal Uncle     Heart Disease Paternal Uncle     Depression Nephew     Anxiety Disorder Nephew     Thyroid Disease Nephew     Thyroid Disease Nephew     Anxiety Disorder Nephew     Diabetes  Type 2  Cousin         paternal cousin    Autoimmune Disease Cousin        Allergies:   Allergies   Allergen Reactions    Apple Juice Anaphylaxis    Corticosteroids Other (See Comments)     All oral, IV and injectable steroids are contraindicated (unless in life threatening situations) in Islet Auto transplant recipients. They can cause irreversible loss of islet cell function. Please contact patient's transplant care coordinator ADI Gaffney RN at [phone]/pager [phone] and/or endocrinologist prior to administration.      Divalproex Sodium Anaphylaxis     Chest pain    Food Anaphylaxis     Several food allergies,    Lactose Nausea and Vomiting and Cramps    Zithromax [Azithromycin Dihydrate] Anaphylaxis     Noted in 4/7/08 ER    Bromfenac      Other reaction(s): Headache    Codeine      Other reaction(s): Hallucinations    Darvocet [Propoxyphene N-Apap] Itching    Morphine Nausea and Vomiting and Rash    Nalbuphine Hcl Rash     RASH :nubaine    Zolmitriptan Rash    Bactrim [Sulfamethoxazole W-Trimethoprim] Other (See Comments) and Nausea and Vomiting     Severely low liver function.    Statins Other (See Comments)     Previous liver issues, risks vs benefits felt to not warrant statin.  Discussed Oct 2022 visit    Tape [Adhesive Tape] Blisters    Tramadol     Zofran [Ondansetron] Other (See Comments)     migraine    Flagyl [Metronidazole] Hives and Rash       Active Medications:   Current Outpatient Medications   Medication Sig Dispense Refill    acetaminophen (TYLENOL) 325 MG/10.15ML solution 20.3 mLs (650 mg) by Per J Tube route every 6 hours as needed for mild pain or fever 473 mL 4    amitriptyline (ELAVIL) 10 MG tablet Take one 10mg tablet and one 50mg tablet together for a total daily dose of 60mg. 90 tablet 3    amitriptyline (ELAVIL) 50 MG tablet Take one 10mg tablet and one 50mg tablet together for a total daily dose of 60mg. 90 tablet 3    baclofen (LIORESAL) 10 MG tablet Take 10 mg by mouth 3  times daily as needed for muscle spasms.      cetirizine (ZYRTEC) 10 MG tablet Take 1 tablet (10 mg) by mouth every morning.      diphenhydrAMINE (BENADRYL) 12.5 MG/5ML solution Take 25 mg by mouth 4 times daily as needed for other (nausea)      droNABinol (MARINOL) 5 MG capsule Take 1 capsule (5 mg) by mouth 2 times daily as needed. 60 capsule 0    estradiol (VAGIFEM) 10 MCG TABS vaginal tablet INSERT 1 TABLET(10 MCG) VAGINALLY 2 TIMES A WEEK 24 tablet 3    famotidine (PEPCID) 20 MG tablet Take 1 tablet (20 mg) by mouth daily. 90 tablet 1    glucagon 1 MG kit Give 0.1 to 0.15mg( 10-15 units on Insulin sryinge) subcutaneous  every 15 minutes PRN for hypoglycemia. Remix new kit q24hr. Needs up to 3 kit/week. 10 each 3    insulin aspart (NOVOLOG VIAL) 100 UNITS/ML vial Uses up to 67 units daily for carb coverage, correction, and priming through insulin pump 60 mL 4    insulin glargine (LANTUS PEN) 100 UNIT/ML pen Inject 3 Units subcutaneously daily. (Patient taking differently: Inject 3 Units subcutaneously daily as needed for other.) 3 mL 3    insulin lispro (HUMALOG LORELEI KWIKPEN) 100 UNIT/ML (0.5 unit dial) KWIKPEN Take 0.5 units per 15 grams of carbohydrate, and correction as directed, up to 20 units per day supply. 3 mL 3    lactated ringers in 1,000 mL via CADD pump Infuse into the vein daily as needed (dehydration). Remove air via CADD pump. Infuse 1 bag(s) (1000 mL). Each bag to infuse over 2 hours. Reservoir Volume 1000 mL. Continuous rate: 500 mL/hr. 052975 mL 0    levothyroxine (SYNTHROID/LEVOTHROID) 175 MCG tablet Take 1 tablet (175 mcg) by mouth daily. 90 tablet 3    lidocaine (LIDODERM) 5 % patch Place onto the skin as needed. To prevent lidocaine toxicity, patient should be patch free for 12 hrs daily.      lipase-protease-amylase (CREON) 16563-81325-263578 units CPEP per EC capsule Take 5- 7 caps with 3 meals a day and with 2 snacks a day 2250 capsule 3    methocarbamol (ROBAXIN) 750 MG tablet Take 1  tablet (750 mg) by mouth 4 times daily as needed for muscle spasms. 120 tablet 11    montelukast (SINGULAIR) 10 MG tablet Take 10 mg by mouth at bedtime.      omeprazole (PRILOSEC) 40 MG DR capsule Take 1 capsule (40 mg) by mouth 2 times daily. 180 capsule 2    prochlorperazine (COMPAZINE) 10 MG tablet Take 1 tablet (10 mg) by mouth every 6 hours as needed for nausea or vomiting. 120 tablet 3    promethazine (PHENERGAN) injection Add to infusion 0.5 mLs (25 mg) every 8 hours as needed for nausea or vomiting. Draw up in a syringe and add to homepump immediately prior to infusing.  Discard remainder of vial.High risk of tissue necrosis with extravasation. Must be given IM or via IVPB. (Patient taking differently: Add to infusion 25 mg every 8 hours as needed for nausea or vomiting. Draw up in a syringe and add to homepump immediately prior to infusing.  Discard remainder of vial.) 95077 mL 0    rimegepant (NURTEC) 75 MG ODT tablet Place 1 tablet (75 mg) under the tongue every 48 hours. 16 tablet 11    scopolamine (TRANSDERM) 1 MG/3DAYS 72 hr patch APPLY 1 PATCH TOPICALLY TO THE SKIN EVERY 72 HOURS 10 patch 5    sodium chloride 0.9% elastomeric pump Infuse 61 mLs over 15 minutes into the vein every 8 hours as needed (for use with promethazine). Add 0.5 mL (25 mg) of promethazine 25 mg/mL to homepump, then add 5 mL NaCl 0.9% to homepump to flush port, immediately prior to infusing. 450347 mL 0    SUMAtriptan (IMITREX STATDOSE) 6 MG/0.5ML pen injector kit Inject 0.5 mLs (6 mg) subcutaneously at onset of headache for migraine. May repeat in 1 hour. Max 12 mg/24 hours. 3 kit 11    zolpidem (AMBIEN) 5 MG tablet Take 1 tablet (5 mg) by mouth nightly as needed for sleep. 30 tablet 1    Continuous Glucose Sensor (DEXCOM G7 SENSOR) MISC Change every 10 days. 9 each 4    Continuous Glucose Sensor (FREESTYLE LISA 3 SENSOR) MISC CHANGE EVERY 14 DAYS 2 each 11    Emergency Supply Kit, Central, Patient use for emergency only.  "Contents: 3 sodium chloride 0.9% flushes, 1 dressing kit, 1 microclave ext set 14\", 4 nitrile gloves (med), 6 alcohol prep pads, 1 bacitracin, 1 syringe (10 cc 20 G 1\"). Call [phone] to reorder. [id number] kit 0    glucose 40 % GEL gel Take 15-30 g by mouth every 15 minutes as needed for low blood sugar 3 Tube 2    insulin aspart (NOVOLOG FLEXPEN) 100 UNIT/ML pen Take 2 units with meals OR use insulin carbohydrate ratio 1 unit per 15 grams of carbohydrates three time daily with meals/snacks plus correction scale 1 unit per 50 >140 three times daily before meals and at bedtime (ok to correct >140 at bedtime since TPN is running overnight). Total daily Novolog: ~ 15 units/day 15 mL 5    insulin syringe-needle U-100 (30G X 1/2\" 0.3 ML) 30G X 1/2\" 0.3 ML miscellaneous Use 3 syringes daily or as directed. 100 each 11    insulin syringe-needle U-100 (31G X 5/16\" 0.3 ML) 31G X 5/16\" 0.3 ML miscellaneous Use 3 syringes daily or as directed. 100 each 11    lactated ringers infusion Inject 1,000 mLs into the vein daily as needed      Ostomy Supplies MISC 1 each every other day. 20 each 11    Ostomy Supplies MISC 20 each every other day. 20 each 11    Port Access Kit For nurse use only.  Do not remove items from bag.  Use for port access.  Do not place syringe on sterile field. [id number] kit 0    Sharps Container (BD SHARPS ) MISC 1 Container as needed 1 each 1    sodium chloride, PF, 0.9% PF flush Inject 10 mLs into the vein as needed for line flush. Flush IV before and after med administration as directed and/or at least every 24 hours, or prior to deaccessing for no further use and/or at least every 4 weeks when not accessed. 166670 mL 0    sodium chloride, PF, 0.9% PF flush Add to infusion 5 mLs every 8 hours. Add to homepump after adding promethazine to flush port. 5400 mL 0    STATIN NOT PRESCRIBED (INTENTIONAL) Previous liver issues, risks vs benefits felt to not warrant statin.    Discussed Oct 2022 visit   " "      Systemic Review:   CONSTITUTIONAL: NEGATIVE for fever, chills, change in weight  ENT/MOUTH: NEGATIVE for ear, mouth and throat problems  RESP: NEGATIVE for significant cough or SOB  CV: NEGATIVE for chest pain, palpitations or peripheral edema    Physical Examination:   Vital Signs: /75 (BP Location: Right arm)   Pulse 95   Temp 97  F (36.1  C) (Temporal)   Resp 18   Ht 1.727 m (5' 8\")   Wt 70.3 kg (155 lb)   SpO2 99%   BMI 23.57 kg/m    GENERAL: healthy, alert and no distress  NECK: no adenopathy, no asymmetry, masses, or scars  RESP: lungs clear to auscultation - no rales, rhonchi or wheezes  CV: regular rate and rhythm, normal S1 S2, no S3 or S4, no murmur, click or rub, no peripheral edema and peripheral pulses strong  ABDOMEN: soft, nontender, no hepatosplenomegaly, no masses and bowel sounds normal  MS: no gross musculoskeletal defects noted, no edema    Plan: Appropriate to proceed as scheduled.      Saleem Schaefer MD  7/22/2025    PCP:  Juan Jose Gomez    "

## 2025-07-22 NOTE — ANESTHESIA PREPROCEDURE EVALUATION
Anesthesia Pre-Procedure Evaluation    Patient: Dinora Mcghee   MRN: [id number] : 1965          Procedure : Procedure(s):  Esophagoscopy, gastroscopy, duodenoscopy (EGD), combined         Past Medical History:   Diagnosis Date    Allergic rhinitis, cause unspecified     Allergy to other foods     strawberries, apples, celeries, alice, watermelon    Arthritis     left knee    Choledocholithiasis     long after cholecystectomy    Chronic abdominal pain     Chronic constipation     Chronic nausea     Chronic pancreatitis (H)     Degeneration of lumbar or lumbosacral intervertebral disc     Depressive disorder     Situational. 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 endometriosis growth    APPENDECTOMY       SECTION  1993    CHOLECYSTECTOMY      COLONOSCOPY  2014    COLONOSCOPY N/A 2023    Procedure: COLONOSCOPY, WITH POLYPECTOMY AND BIOPSY;  Surgeon: Percy Chamorro MD;  [facility]    ENDOSCOPIC INSERTION TUBE GASTROSTOMY N/A 2021    Procedure: Gastrojejonostomy placement with jejunopexy, PEG tube exchange;  Surgeon: Zackery Montoya MD;  [facility]    ENDOSCOPIC RETROGRADE CHOLANGIOPANCREATOGRAM N/A 2015    Procedure: ENDOSCOPIC RETROGRADE CHOLANGIOPANCREATOGRAM;  Surgeon: Mandeep Park MD;  [facility]    ENDOSCOPIC  RETROGRADE CHOLANGIOPANCREATOGRAM N/A 02/09/2016    Procedure: COMBINED ENDOSCOPIC RETROGRADE CHOLANGIOPANCREATOGRAPHY, PLACE TUBE/STENT;  Surgeon: Mandeep Park MD;  [facility]    ENDOSCOPIC RETROGRADE CHOLANGIOPANCREATOGRAM N/A 03/17/2016    Procedure: COMBINED ENDOSCOPIC RETROGRADE CHOLANGIOPANCREATOGRAPHY, REMOVE FOREIGN BODY OR STENT/TUBE;  Surgeon: Mandeep Park MD;  [facility]    ENDOSCOPIC RETROGRADE CHOLANGIOPANCREATOGRAM N/A 08/02/2016    Procedure: COMBINED ENDOSCOPIC RETROGRADE CHOLANGIOPANCREATOGRAPHY, PLACE TUBE/STENT;  Surgeon: Mandeep Park MD;  [facility]    ENDOSCOPIC RETROGRADE CHOLANGIOPANCREATOGRAM N/A 08/26/2016    Procedure: COMBINED ENDOSCOPIC RETROGRADE CHOLANGIOPANCREATOGRAPHY, REMOVE FOREIGN BODY OR STENT/TUBE;  Surgeon: Mandeep Park MD;  [facility]    ENDOSCOPIC ULTRASOUND UPPER GASTROINTESTINAL TRACT (GI) N/A 10/03/2016    Procedure: ENDOSCOPIC ULTRASOUND, ESOPHAGOSCOPY / UPPER GASTROINTESTINAL TRACT (GI);  Surgeon: Guru Jose Rodas MD;  [facility]    ENT SURGERY  1989    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    ENTEROSCOPY SMALL BOWEL N/A 02/03/2022    Procedure: ENTEROSCOPY with possible fistula closure;  Surgeon: Francisco Dodson MD;  Location:  GI    ENTEROSCOPY SMALL BOWEL N/A 09/11/2024    Procedure: Upper endoscopy, gastrostomy tube placement and jejunostomy tube exchange;  Surgeon: Zackery Montoya MD;  [facility]    ESOPHAGOSCOPY, GASTROSCOPY, DUODENOSCOPY (EGD), COMBINED N/A 06/24/2015    Procedure: COMBINED ESOPHAGOSCOPY, GASTROSCOPY, DUODENOSCOPY (EGD), REMOVE FOREIGN BODY;  Surgeon: Mandeep Park MD;  [facility]    ESOPHAGOSCOPY, GASTROSCOPY, DUODENOSCOPY (EGD), COMBINED N/A 10/25/2015    Procedure: COMBINED ESOPHAGOSCOPY, GASTROSCOPY, DUODENOSCOPY (EGD);  Surgeon: Sammy Amaro MD;  [facility]    ESOPHAGOSCOPY, GASTROSCOPY, DUODENOSCOPY (EGD), COMBINED  N/A 10/25/2015    Procedure: COMBINED ESOPHAGOSCOPY, GASTROSCOPY, DUODENOSCOPY (EGD), BIOPSY SINGLE OR MULTIPLE;  Surgeon: Sammy Amaro MD;  [facility]    ESOPHAGOSCOPY, GASTROSCOPY, DUODENOSCOPY (EGD), COMBINED N/A 01/31/2023    Procedure: ESOPHAGOGASTRODUODENOSCOPY (EGD) with peg replacement ;  Surgeon: Mandeep Park MD;  [facility]    ESOPHAGOSCOPY, GASTROSCOPY, DUODENOSCOPY (EGD), COMBINED N/A 07/24/2024    Procedure: Esophagoscopy, gastroscopy, duodenoscopy (EGD), combined;  Surgeon: Zackery Montoya MD;  [facility]    ESOPHAGOSCOPY, GASTROSCOPY, DUODENOSCOPY (EGD), COMBINED N/A 10/31/2024    Procedure: Esophagoscopy, gastroscopy, duodenoscopy (EGD), combined;  Surgeon: Mandeep Park MD;  [facility]    ESOPHAGOSCOPY, GASTROSCOPY, DUODENOSCOPY (EGD), DILATATION, COMBINED      EXCISE LESION TRUNK N/A 04/17/2017    Procedure: EXCISE LESION TRUNK;  Removal of Abdominal Foreign Body;  Surgeon: Nestor Phoenix MD;  Location:  OR    GENITOURINARY SURGERY  1999    Hysterectomy    HC ESOPH/GAS REFLUX TEST W NASAL IMPED >1 HR N/A 11/19/2015    Procedure: ESOPHAGEAL IMPEDENCE FUNCTION TEST WITH 24 HOUR PH GREATER THAN 1 HOUR;  Surgeon: Thiago Apple MD;  [facility]     [facility] ENDOSCOPY DIAG W BIOPSY  09/17/2008     [facility] ENDOSCOPY DIAG W BIOPSY  09/27/2012    [facility] ENDOSCOPY W ESOPHAGEAL DILATION BALLOON <30MM  09/17/2008    [facility] ENDOSCOPY W EUS N/A 05/05/2015    Procedure: COMBINED ENDOSCOPIC ULTRASOUND, ESOPHAGOSCOPY, GASTROSCOPY, DUODENOSCOPY (EGD);  Surgeon: Wm Dueñas MD;  [facility]     WRIST ARTHROSCOP,RELEASE XVERS LIG Bilateral 12/17/2008    INJECT TRANSVERSUS ABDOMINIS PLANE (TAP) BLOCK BILATERAL Left 09/22/2016    Procedure: INJECT TRANSVERSUS ABDOMINIS PLANE (TAP) BLOCK BILATERAL;  Surgeon: Dickson Corrigan MD;  [facility]    INJECT TRIGGER POINT Bilateral 09/08/2022    Procedure: Abdominal trigger point injection with  ultrasound;  Surgeon: Monika Mahajan MD;  [facility]    INJECT TRIGGER POINT SINGLE / MULTIPLE 3 OR MORE MUSCLES Right 11/15/2022    Procedure: Trigger point injections right abdomen with ultrasound guidance;  Surgeon: Monika Mahajan MD;  [facility]: [facility]    IR CHEST PORT PLACEMENT < 5 YRS OF AGE  06/10/2022    IR CHEST PORT PLACEMENT > 5 YRS OF AGE  01/29/2025    IR CVC TUNNEL PLACEMENT > 5 YRS OF AGE  04/15/2024    IR CVC TUNNEL REMOVAL RIGHT  01/29/2025    IR PORT REMOVAL RIGHT  11/09/2023    LAPAROSCOPIC ASSISTED COLECTOMY N/A 10/11/2024    Procedure: Exploratory laparotomy, extensive lysis of adhesions, revision of Jtube and small bowel resection, TOTAL ABDOMINAL COLECTOMY WITH ILEORECTAL ANASTAMOSIS, DIVERTING LOOP ILEOSTOMY, flexible sigmoidoscopy;  Surgeon: Kaylene Branch MD;  [facility]    laparoscopic pineda  01/01/1995    LAPAROSCOPIC HERNIORRHAPHY INCISIONAL N/A 08/23/2018    Procedure: LAPAROSCOPIC HERNIORRHAPHY INCISIONAL;  Laparoscopic Incisional Hernia Repair with Symbotex Mesh Implant;  Surgeon: Nestor Phoenix MD;  [facility]    LAPAROSCOPIC PANCREATECTOMY, TRANSPLANT AUTO ISLET CELL N/A 12/28/2016    Procedure: LAPAROSCOPIC PANCREATECTOMY, TRANSPLANT AUTO ISLET CELL;  Surgeon: Nestor Phoenix MD;  [facility]    LAPAROTOMY EXPLORATORY N/A 01/31/2023    Procedure: Exploratory Laparotomy, lysis of adhesions, Perforated J-Junostomy Resection, Replace J-Junostomy site;  Surgeon: Elver Bauer MD;  [facility]    LAPAROTOMY, LYSIS ADHESIONS, COMBINED N/A 01/31/2023    Procedure: Dilatation of jejunostomy feeding tube, track with placement of jejunostomy tube with fluoroscopy;  Surgeon: Elver Bauer MD;  [facility]    PICC DOUBLE LUMEN PLACEMENT Left 11/13/2023    Basilic Vein 5F DL 43 cm    REMOVE AND REPLACE BREAST IMPLANT PROSTHESIS N/A 12/30/2022    Procedure: Exploratory Laparotomy, lysis of adhesions, small bowel resection. Placement  of gastric jejunostomy for enteral feeding.;  Surgeon: Elver Bauer MD;  [facility]    REMOVE GASTROSTOMY TUBE ADULT N/A 01/28/2022    Procedure: REMOVAL, GASTROSTOMY TUBE, ADULT;  Surgeon: Mandeep Park MD;  [facility]    REMOVE GASTROSTOMY TUBE ADULT N/A 12/02/2024    Procedure: Remove gastrostomy tube adult- PEG;  Surgeon: Mandeep Park MD;  [facility]    REPLACE GASTROJEJUNOSTOMY TUBE, PERCUTANEOUS N/A 09/07/2021    Procedure: GASTROJEJUNOSTOMY TUBE PLACEMENT, PERCUTANEOUS, WITH GASTROPEXY;  Surgeon: Mandeep Park MD;  [facility]    REPLACE GASTROJEJUNOSTOMY TUBE, PERCUTANEOUS N/A 09/22/2021    Procedure: REPLACEMENT, GASTROJEJUNOSTOMY TUBE, PERCUTANEOUS;  Surgeon: Zackery Montoya MD;  [facility]    REPLACE GASTROJEJUNOSTOMY TUBE, PERCUTANEOUS N/A 11/22/2022    Procedure: REPLACEMENT, GASTROJEJUNOSTOMY TUBE, PERCUTANEOUS;  Surgeon: Mandeep Park MD;  [facility]    REPLACE GASTROJEJUNOSTOMY TUBE, PERCUTANEOUS N/A 12/09/2022    Procedure: REPLACEMENT, GASTROJEJUNOSTOMY TUBE, PERCUTANEOUS with ENDOSCOPIC SUTURING.;  Surgeon: Mandeep Park MD;  [facility]    REPLACE GASTROJEJUNOSTOMY TUBE, PERCUTANEOUS N/A 08/01/2023    Procedure: Replace Gastrojejunostomy Tube, Percutaneous;  Surgeon: Mandeep Park MD;  [facility]    REPLACE GASTROJEJUNOSTOMY TUBE, PERCUTANEOUS N/A 11/11/2023    Procedure: Replace Gastrojejunostomy Tube, Percutaneous;  Surgeon: Francisco Dodson MD;  [facility]    REPLACE GASTROJEJUNOSTOMY TUBE, PERCUTANEOUS N/A 12/08/2023    Procedure: Replace Gastrojejunostomy Tube, Percutaneous;  Surgeon: Mandeep Park MD;  [facility]    REPLACE GASTROSTOMY TUBE, PERCUTANEOUS N/A 10/31/2024    Procedure: REPLACEMENT, GASTROSTOMY TUBE, PERCUTANEOUS;  Surgeon: Mandeep Park MD;  [facility]    REPLACE JEJUNOSTOMY TUBE, PERCUTANEOUS N/A 09/10/2021    Procedure: UPPER  ENDOSCOPY, REPLACEMENT OF PERCUTANEOUS GASTROJEJUNOSTOMY TUBE, T-TAG GASTROPEXY;  Surgeon: Zackery Montoya MD;  [facility]    REPLACE JEJUNOSTOMY TUBE, PERCUTANEOUS N/A 2021    Procedure: REPLACEMENT, JEJUNOSTOMY TUBE, PERCUTANEOUS;  Surgeon: Mandeep Park MD;  [facility]    REPLACE JEJUNOSTOMY TUBE, PERCUTANEOUS N/A 2023    Procedure: REPLACEMENT, JEJUNOSTOMY TUBE, PERCUTANEOUS;  Surgeon: Mandeep Park MD;  [facility]    REPLACE JEJUNOSTOMY TUBE, PERCUTANEOUS  2023    Procedure: Replace Jejunostomy Tube, Percutaneous;  Surgeon: Mandeep Park MD;  [facility]    REPLACE JEJUNOSTOMY TUBE, PERCUTANEOUS N/A 2024    Procedure: REPLACEMENT, JEJUNOSTOMY TUBE, PERCUTANEOUS;  Surgeon: Francisco Dodson MD;  [facility]    REPLACE JEJUNOSTOMY TUBE, PERCUTANEOUS N/A 10/31/2024    Procedure: REPLACEMENT, JEJUNOSTOMY TUBE, PERCUTANEOUS;  Surgeon: Mandeep Park MD;  [facility]    transphenoidal pituitary resection  1990    [facility]  DELIVERY ONLY  1996    [facility]  DELIVERY ONLY  1998    repeat c section with incidental cystotomy with repair    [facility] EXCIS PITUITARY,TRANSNASAL/SEPTAL  1980    pituitary tumor removed for diabetes insipidus    [facility] TOTAL ABDOM HYSTERECTOMY  1999    w/ bilateral salpingoophorectomy       Allergies   Allergen Reactions    Apple Juice Anaphylaxis    Corticosteroids Other (See Comments)     All oral, IV and injectable steroids are contraindicated (unless in life threatening situations) in [facility] [employer] transplant recipients. They can cause irreversible loss of islet cell function. Please contact patient's transplant care coordinator ADI Gaffney RN at [phone]/pager [phone] and/or endocrinologist prior to administration.      Divalproex Sodium Anaphylaxis     Chest pain    Food Anaphylaxis     Several food allergies,    Lactose Nausea and Vomiting and Cramps    Zithromax  [Azithromycin Dihydrate] Anaphylaxis     Noted in 08 ER    Bromfenac      Other reaction(s): Headache    Codeine      Other reaction(s): Hallucinations    Darvocet [Propoxyphene N-Apap] Itching    Morphine Nausea and Vomiting and Rash    Nalbuphine Hcl Rash     RASH :nubaine    Zolmitriptan Rash    Bactrim [Sulfamethoxazole W-Trimethoprim] Other (See Comments) and Nausea and Vomiting     Severely low liver function.    Statins Other (See Comments)     Previous liver issues, risks vs benefits felt to not warrant statin.  Discussed Oct 2022 visit    Tape [Adhesive Tape] Blisters    Tramadol     Zofran [Ondansetron] Other (See Comments)     migraine    Flagyl [Metronidazole] Hives and Rash      Social History     Tobacco Use    Smoking status: Former     Current packs/day: 0.00     Average packs/day: 0.5 packs/day for 6.3 years (3.2 ttl pk-yrs)     Types: Cigarettes     Start date: 1985     Quit date: 1992     Years since quittin.5    Smokeless tobacco: Never    Tobacco comments:     no 2nd hand   Substance Use Topics    Alcohol use: Not Currently     Alcohol/week: 3.0 - 6.0 standard drinks of alcohol     Types: 1 - 2 Glasses of wine, 1 - 2 Cans of beer, 1 - 2 Shots of liquor per week     Comment: none since IGG      Wt Readings from Last 1 Encounters:   25 70.3 kg (155 lb)        Anesthesia Evaluation   Pt has had prior anesthetic. Type: General and MAC.    History of anesthetic complications  - PONV.      ROS/MED HX  ENT/Pulmonary:    (-) tobacco use, asthma, COPD and recent URI   Neurologic:       Cardiovascular:    (-) ALBARADO, orthopnea/PND and syncope   METS/Exercise Tolerance: >4 METS Comment: Biking, walking 1 mile without chest pain/pressure, lightheadedness, dyspnea   Hematologic:       Musculoskeletal:       GI/Hepatic: Comment: Chronic nausea, gastroparesis. Clear liquid diet only since .   +Ostomy      (+) GERD,                   Renal/Genitourinary:       Endo: Comment: S/p  pancreatectomy and autoislet transplant. Insulin pump - on exercise mode for procedures, per Endo. 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    Other Findings       OUTSIDE LABS:  CBC:   Lab Results   Component Value Date    WBC 4.5 07/16/2025    WBC 5.1 05/22/2025    HGB 13.3 07/16/2025    HGB 12.2 05/22/2025    HCT 42.4 07/16/2025    HCT 39.1 05/22/2025     (H) 07/16/2025     (H) 05/22/2025     BMP:   Lab Results   Component Value Date     04/14/2025     03/12/2025    POTASSIUM 4.4 04/14/2025    POTASSIUM 4.6 03/12/2025    CHLORIDE 107 04/14/2025    CHLORIDE 102 03/12/2025    CO2 24 04/14/2025    CO2 27 03/12/2025    BUN 11.6 04/14/2025    BUN 11.9 03/12/2025    CR 0.76 04/14/2025    CR 0.76 03/12/2025    GLC 90 07/22/2025     (H) 04/14/2025     COAGS:   Lab Results   Component Value Date    PTT 29 01/07/2017    INR 1.06 01/29/2025    FIBR 225 12/28/2016     POC:   Lab Results   Component Value Date    BGM 85 08/24/2018    HCG Negative 12/19/2016     HEPATIC:   Lab Results   Component Value Date    ALBUMIN 3.8 04/14/2025    PROTTOTAL 6.5 04/14/2025    ALT 27 04/14/2025    AST 25 04/14/2025     (H) 06/04/2023    ALKPHOS 206 (H) 04/14/2025    BILITOTAL 0.2 04/14/2025     OTHER:   Lab Results   Component Value Date    PH 7.49 (H) 12/28/2016    LACT 0.8 11/17/2024    A1C 5.8 (H) 04/03/2025    KASSI 8.9 04/14/2025    PHOS 2.9 11/19/2024    MAG 2.1 03/12/2025    LIPASE 6 (L) 11/17/2024    AMYLASE 45 01/23/2017    TSH 1.65 03/12/2025     T4 0.66 (L) 11/06/2024    CRP 90.0 (H) 12/29/2016    SED 47 (H) 11/17/2024       Anesthesia Plan    ASA Status:  3      NPO Status: ELEVATED Aspiration Risk/Unknown   Anesthesia Type: MAC.   Techniques and Equipment:       - Monitoring Plan: standard ASA monitoring     Consents    Anesthesia Plan(s) and associated risks, benefits, and realistic alternatives discussed. Questions answered and patient/representative(s) expressed understanding.     - Discussed: anesthesiologist     - Discussed with:  Patient               Postoperative Care         Comments:    Other Comments: Had a longer discussion regarding anesthetic plan, as Dinora has had experiences in the past of being aware despite being told she should have been asleep during sedation procedures, and very uncomfortable during some cases. We did discussed expectations and possibility of awareness/recall, which she understands. On review of anesthetic records, such as 4/2024 MAC case, the propofol dosing is lower than what we would usually use at this site for this procedure. I discussed that she would likely be more asleep today, based on this. We discussed midazolam, including benefit of additional sedation and downside of postprocedure grogginess. CRNA also involved in the discussion. Ultimately, Dinora decided to try our usual sedation regimen. Discussed plan for MAC, including possibility of awareness, risk of aspiration pneumonia, risk of hypoxia/low oxygen/airway obstruction requiring additional airway support/devices. Discussed alternatives. Discussed backup plan of general anesthesia and risks of general anesthesia, including intubation. Ensured understanding, invited questions and all questions were answered.               Yuliya Cook MD    I have reviewed the pertinent notes and labs in the chart from the past 30 days and (re)examined the patient.  Any updates or changes from those notes are reflected in this note.    Clinically Significant Risk  Factors Present on Admission                                 # Financial/Environmental Concerns:

## 2025-07-23 ENCOUNTER — HOME INFUSION BILLING (OUTPATIENT)
Dept: HOME HEALTH SERVICES | Facility: HOME HEALTH | Age: 60
End: 2025-07-23
Payer: COMMERCIAL

## 2025-07-23 ENCOUNTER — HOME INFUSION (OUTPATIENT)
Dept: HOME HEALTH SERVICES | Facility: HOME HEALTH | Age: 60
End: 2025-07-23
Payer: COMMERCIAL

## 2025-07-23 PROCEDURE — S9542 HT INJ NOC PER DIEM: HCPCS

## 2025-07-24 LAB
PATH REPORT.ADDENDUM SPEC: NORMAL
PATH REPORT.COMMENTS IMP SPEC: NORMAL
PATH REPORT.COMMENTS IMP SPEC: NORMAL
PATH REPORT.FINAL DX SPEC: NORMAL
PATH REPORT.GROSS SPEC: NORMAL
PATH REPORT.MICROSCOPIC SPEC OTHER STN: NORMAL
PATH REPORT.RELEVANT HX SPEC: NORMAL
PHOTO IMAGE: NORMAL

## 2025-07-24 PROCEDURE — S9542 HT INJ NOC PER DIEM: HCPCS

## 2025-07-28 ENCOUNTER — OFFICE VISIT (OUTPATIENT)
Dept: NEUROLOGY | Facility: CLINIC | Age: 60
End: 2025-07-28
Payer: COMMERCIAL

## 2025-07-28 VITALS
RESPIRATION RATE: 16 BRPM | OXYGEN SATURATION: 97 % | HEART RATE: 91 BPM | SYSTOLIC BLOOD PRESSURE: 116 MMHG | DIASTOLIC BLOOD PRESSURE: 75 MMHG

## 2025-07-28 DIAGNOSIS — R20.2 PARESTHESIA: ICD-10-CM

## 2025-07-28 DIAGNOSIS — R29.898 LEFT ARM WEAKNESS: Primary | ICD-10-CM

## 2025-07-28 PROCEDURE — 99215 OFFICE O/P EST HI 40 MIN: CPT | Performed by: NURSE PRACTITIONER

## 2025-07-28 PROCEDURE — 3078F DIAST BP <80 MM HG: CPT | Performed by: NURSE PRACTITIONER

## 2025-07-28 PROCEDURE — 1126F AMNT PAIN NOTED NONE PRSNT: CPT | Performed by: NURSE PRACTITIONER

## 2025-07-28 PROCEDURE — 3074F SYST BP LT 130 MM HG: CPT | Performed by: NURSE PRACTITIONER

## 2025-07-28 ASSESSMENT — PAIN SCALES - GENERAL: PAINLEVEL_OUTOF10: NO PAIN (0)

## 2025-07-28 NOTE — PROGRESS NOTES
[facility]    Headache Neurology Progress Note  July 28, 2025      Assessment/Plan:   Dinora Mcghee is a 60 year old   Left hand weakness and left upper extremity paresthesia most pronounced in the half of the third, whole forth and fifth digits, medial aspect of left upper extremity. New acute onset 4 days ago. Exam findings of weakness with left hand , finger extension. Differentials radiculopathy, plexopathy, ulnar and/or radial neuropathy  Plan:  EMG to help to localize the lesion   Cervical MRI with and without a contrast   Follow up after imaging and EMG for reassessment     51 minutes spent on the date of the encounter doing face to face access, chart  review, exam, results review,  meds review, treatment plan, documentation and further activities as noted above    NATALY Andrew, CNP [facility]    Subjective:    Dinora Mcghee returns for follow up of a new neurological problem  Accompanied by .   Patient has been seen in our Headache Clinic for headache management and receiving Botox since 2022. Botox has been beneficial for migraine management and no side effects in the past 3 years.   Last Botox with chronic migraine/PREEMPT  protocoloL 7/21/2025  New symptoms  Reports that 4 days after the Botox Was sitting at the desk and dose up and than went to bed and woke up with the numbness. No numbness or weakness in her hand before falling asleep.   Started as numbness and tingling and pressure pain in the fingers. Pressure in the fingers and in veliz surface at back of the head of the last 3 digits. -half of the 3d finger and 4th and 5th finger. Numbness feeling Goes along back of the arm and ends  at the lateral neck area. Symptoms the same for 4 days the same. Affecting daily activities-hard to type, knit, write, hold objects  Right side trap after the Botox but no bruising on the left side. Left shoulder tingling but can move up and range  of motion is normal. No problems with lifting arm up.     Endoscopy with anesthesia on  and Pilates work on Wednesday - -neck work up and core work -routine. No injuries or any other out of routine activities.     History of surgical carpal tunnel syndrome release 30 years ago    PMH:  Past Medical History:   Diagnosis Date    Allergic rhinitis, cause unspecified     Allergy to other foods     strawberries, apples, celeries, alice, watermelon    Arthritis     left knee    Choledocholithiasis     long after cholecystectomy    Chronic abdominal pain     Chronic constipation     Chronic nausea     Chronic pancreatitis (H)     Degeneration of lumbar or lumbosacral intervertebral disc     Depressive disorder     Situational. 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 endometriosis growth    APPENDECTOMY       SECTION  1993    CHOLECYSTECTOMY      COLONOSCOPY  2014    COLONOSCOPY N/A 2023    Procedure: COLONOSCOPY, WITH POLYPECTOMY AND BIOPSY;  Surgeon: Percy Chamorro MD;  [facility]    ENDOSCOPIC INSERTION TUBE GASTROSTOMY N/A 2021    Procedure: Gastrojejonostomy placement with jejunopexy, PEG tube exchange;  Surgeon: Zackery Montoya MD;  [facility]    ENDOSCOPIC RETROGRADE CHOLANGIOPANCREATOGRAM N/A 2015    Procedure: ENDOSCOPIC RETROGRADE  CHOLANGIOPANCREATOGRAM;  Surgeon: Mandeep Park MD;  [facility]    ENDOSCOPIC RETROGRADE CHOLANGIOPANCREATOGRAM N/A 02/09/2016    Procedure: COMBINED ENDOSCOPIC RETROGRADE CHOLANGIOPANCREATOGRAPHY, PLACE TUBE/STENT;  Surgeon: Mandeep Park MD;  [facility]    ENDOSCOPIC RETROGRADE CHOLANGIOPANCREATOGRAM N/A 03/17/2016    Procedure: COMBINED ENDOSCOPIC RETROGRADE CHOLANGIOPANCREATOGRAPHY, REMOVE FOREIGN BODY OR STENT/TUBE;  Surgeon: Mandeep Park MD;  [facility]    ENDOSCOPIC RETROGRADE CHOLANGIOPANCREATOGRAM N/A 08/02/2016    Procedure: COMBINED ENDOSCOPIC RETROGRADE CHOLANGIOPANCREATOGRAPHY, PLACE TUBE/STENT;  Surgeon: Mandeep Park MD;  [facility]    ENDOSCOPIC RETROGRADE CHOLANGIOPANCREATOGRAM N/A 08/26/2016    Procedure: COMBINED ENDOSCOPIC RETROGRADE CHOLANGIOPANCREATOGRAPHY, REMOVE FOREIGN BODY OR STENT/TUBE;  Surgeon: Mandeep Park MD;  [facility]    ENDOSCOPIC ULTRASOUND UPPER GASTROINTESTINAL TRACT (GI) N/A 10/03/2016    Procedure: ENDOSCOPIC ULTRASOUND, ESOPHAGOSCOPY / UPPER GASTROINTESTINAL TRACT (GI);  Surgeon: Guru Jose Rodas MD;  Location:  OR    ENT SURGERY  1989    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    ENTEROSCOPY SMALL BOWEL N/A 02/03/2022    Procedure: ENTEROSCOPY with possible fistula closure;  Surgeon: Francisco Dodson MD;  Location:  GI    ENTEROSCOPY SMALL BOWEL N/A 09/11/2024    Procedure: Upper endoscopy, gastrostomy tube placement and jejunostomy tube exchange;  Surgeon: Zackery Montoya MD;  [facility]    ESOPHAGOSCOPY, GASTROSCOPY, DUODENOSCOPY (EGD), COMBINED N/A 06/24/2015    Procedure: COMBINED ESOPHAGOSCOPY, GASTROSCOPY, DUODENOSCOPY (EGD), REMOVE FOREIGN BODY;  Surgeon: Mandeep Park MD;  [facility]    ESOPHAGOSCOPY, GASTROSCOPY, DUODENOSCOPY (EGD), COMBINED N/A 10/25/2015    Procedure: COMBINED ESOPHAGOSCOPY, GASTROSCOPY, DUODENOSCOPY (EGD);  Surgeon: Sammy Amaro  MD Tunde;  [facility]    ESOPHAGOSCOPY, GASTROSCOPY, DUODENOSCOPY (EGD), COMBINED N/A 10/25/2015    Procedure: COMBINED ESOPHAGOSCOPY, GASTROSCOPY, DUODENOSCOPY (EGD), BIOPSY SINGLE OR MULTIPLE;  Surgeon: Sammy Amaro MD;  [facility]    ESOPHAGOSCOPY, GASTROSCOPY, DUODENOSCOPY (EGD), COMBINED N/A 01/31/2023    Procedure: ESOPHAGOGASTRODUODENOSCOPY (EGD) with peg replacement ;  Surgeon: Mandeep Park MD;  [facility]    ESOPHAGOSCOPY, GASTROSCOPY, DUODENOSCOPY (EGD), COMBINED N/A 07/24/2024    Procedure: Esophagoscopy, gastroscopy, duodenoscopy (EGD), combined;  Surgeon: Zackery Montoya MD;  [facility]    ESOPHAGOSCOPY, GASTROSCOPY, DUODENOSCOPY (EGD), COMBINED N/A 10/31/2024    Procedure: Esophagoscopy, gastroscopy, duodenoscopy (EGD), combined;  Surgeon: Mandeep Park MD;  [facility]    ESOPHAGOSCOPY, GASTROSCOPY, DUODENOSCOPY (EGD), COMBINED N/A 7/22/2025    Procedure: Esophagoscopy, gastroscopy, duodenoscopy (EGD), combined with biopsy and balloon dilation;  Surgeon: Saleem Schaefer MD;  Location: [facility]    ESOPHAGOSCOPY, GASTROSCOPY, DUODENOSCOPY (EGD), DILATATION, COMBINED      EXCISE LESION TRUNK N/A 04/17/2017    Procedure: EXCISE LESION TRUNK;  Removal of Abdominal Foreign Body;  Surgeon: Nestor Phoenix MD;  [facility]    GENITOURINARY SURGERY  1999    Hysterectomy    HC ESOPH/GAS REFLUX TEST W NASAL IMPED >1 HR N/A 11/19/2015    Procedure: ESOPHAGEAL IMPEDENCE FUNCTION TEST WITH 24 HOUR PH GREATER THAN 1 HOUR;  Surgeon: Thiago Apple MD;  [facility]    HC [facility] ENDOSCOPY DIAG W BIOPSY  09/17/2008    [facility] ENDOSCOPY DIAG W BIOPSY  09/27/2012    HC UGI ENDOSCOPY W ESOPHAGEAL DILATION BALLOON <30MM  09/17/2008    HC U[facility] ENDOSCOPY W EUS N/A 05/05/2015    Procedure: COMBINED ENDOSCOPIC ULTRASOUND, ESOPHAGOSCOPY, GASTROSCOPY, DUODENOSCOPY (EGD);  Surgeon: Wm Dueñas MD;  Location: [facility] WRIST  ARTHROSCOP,RELEASE XVERS LIG Bilateral 12/17/2008    INJECT TRANSVERSUS ABDOMINIS PLANE (TAP) BLOCK BILATERAL Left 09/22/2016    Procedure: INJECT TRANSVERSUS ABDOMINIS PLANE (TAP) BLOCK BILATERAL;  Surgeon: Dickson oCrrigan MD;  [facility]    INJECT TRIGGER POINT Bilateral 09/08/2022    Procedure: Abdominal trigger point injection with ultrasound;  Surgeon: Monika Mahajan MD;  [facility]    INJECT TRIGGER POINT SINGLE / MULTIPLE 3 OR MORE MUSCLES Right 11/15/2022    Procedure: Trigger point injections right abdomen with ultrasound guidance;  Surgeon: Monika Mahajan MD;  Location: [facility] OR    IR CHEST PORT PLACEMENT < 5 YRS OF AGE  06/10/2022    IR CHEST PORT PLACEMENT > 5 YRS OF AGE  01/29/2025    IR CVC TUNNEL PLACEMENT > 5 YRS OF AGE  04/15/2024    IR CVC TUNNEL REMOVAL RIGHT  01/29/2025    IR PORT REMOVAL RIGHT  11/09/2023    LAPAROSCOPIC ASSISTED COLECTOMY N/A 10/11/2024    Procedure: Exploratory laparotomy, extensive lysis of adhesions, revision of Jtube and small bowel resection, TOTAL ABDOMINAL COLECTOMY WITH ILEORECTAL ANASTAMOSIS, DIVERTING LOOP ILEOSTOMY, flexible sigmoidoscopy;  Surgeon: Kaylene Branch MD;  [facility]    laparoscopic pineda  01/01/1995    LAPAROSCOPIC HERNIORRHAPHY INCISIONAL N/A 08/23/2018    Procedure: LAPAROSCOPIC HERNIORRHAPHY INCISIONAL;  Laparoscopic Incisional Hernia Repair with Symbotex Mesh Implant;  Surgeon: Nestor Phoenix MD;  [facility]    LAPAROSCOPIC PANCREATECTOMY, TRANSPLANT AUTO ISLET CELL N/A 12/28/2016    Procedure: LAPAROSCOPIC PANCREATECTOMY, TRANSPLANT AUTO ISLET CELL;  Surgeon: Nestor Phoenix MD;  [facility]    LAPAROTOMY EXPLORATORY N/A 01/31/2023    Procedure: Exploratory Laparotomy, lysis of adhesions, Perforated J-Junostomy Resection, Replace J-Junostomy site;  Surgeon: Elver Bauer MD;  [facility]    LAPAROTOMY, LYSIS ADHESIONS, COMBINED N/A 01/31/2023    Procedure: Dilatation of jejunostomy feeding tube,  track with placement of jejunostomy tube with fluoroscopy;  Surgeon: Elver Bauer MD;  [facility]    PICC DOUBLE LUMEN PLACEMENT Left 11/13/2023    Basilic Vein 5F DL 43 cm    REMOVE AND REPLACE BREAST IMPLANT PROSTHESIS N/A 12/30/2022    Procedure: Exploratory Laparotomy, lysis of adhesions, small bowel resection. Placement of gastric jejunostomy for enteral feeding.;  Surgeon: Elver Bauer MD;  [facility]    REMOVE GASTROSTOMY TUBE ADULT N/A 01/28/2022    Procedure: REMOVAL, GASTROSTOMY TUBE, ADULT;  Surgeon: Mandeep Park MD;  [facility]    REMOVE GASTROSTOMY TUBE ADULT N/A 12/02/2024    Procedure: Remove gastrostomy tube adult- PEG;  Surgeon: Mandeep Park MD;  [facility]    REPLACE GASTROJEJUNOSTOMY TUBE, PERCUTANEOUS N/A 09/07/2021    Procedure: GASTROJEJUNOSTOMY TUBE PLACEMENT, PERCUTANEOUS, WITH GASTROPEXY;  Surgeon: Mandeep Park MD;  [facility]    REPLACE GASTROJEJUNOSTOMY TUBE, PERCUTANEOUS N/A 09/22/2021    Procedure: REPLACEMENT, GASTROJEJUNOSTOMY TUBE, PERCUTANEOUS;  Surgeon: Zackery Montoya MD;  [facility]    REPLACE GASTROJEJUNOSTOMY TUBE, PERCUTANEOUS N/A 11/22/2022    Procedure: REPLACEMENT, GASTROJEJUNOSTOMY TUBE, PERCUTANEOUS;  Surgeon: Mandeep Park MD;  [facility]    REPLACE GASTROJEJUNOSTOMY TUBE, PERCUTANEOUS N/A 12/09/2022    Procedure: REPLACEMENT, GASTROJEJUNOSTOMY TUBE, PERCUTANEOUS with ENDOSCOPIC SUTURING.;  Surgeon: Mandeep Park MD;  [facility]    REPLACE GASTROJEJUNOSTOMY TUBE, PERCUTANEOUS N/A 08/01/2023    Procedure: Replace Gastrojejunostomy Tube, Percutaneous;  Surgeon: Mandeep Park MD;  [facility]    REPLACE GASTROJEJUNOSTOMY TUBE, PERCUTANEOUS N/A 11/11/2023    Procedure: Replace Gastrojejunostomy Tube, Percutaneous;  Surgeon: Francisco Dodson MD;  [facility]    REPLACE GASTROJEJUNOSTOMY TUBE, PERCUTANEOUS N/A 12/08/2023    Procedure: Replace  Gastrojejunostomy Tube, Percutaneous;  Surgeon: Mandeep Park MD;  [facility]    REPLACE GASTROSTOMY TUBE, PERCUTANEOUS N/A 10/31/2024    Procedure: REPLACEMENT, GASTROSTOMY TUBE, PERCUTANEOUS;  Surgeon: Mandeep Park MD;  [facility]    REPLACE JEJUNOSTOMY TUBE, PERCUTANEOUS N/A 09/10/2021    Procedure: UPPER ENDOSCOPY, REPLACEMENT OF PERCUTANEOUS GASTROJEJUNOSTOMY TUBE, T-TAG GASTROPEXY;  Surgeon: Zackery Montoya MD;  [facility]    REPLACE JEJUNOSTOMY TUBE, PERCUTANEOUS N/A 2021    Procedure: REPLACEMENT, JEJUNOSTOMY TUBE, PERCUTANEOUS;  Surgeon: Mandeep Park MD;  [facility]    REPLACE JEJUNOSTOMY TUBE, PERCUTANEOUS N/A 2023    Procedure: REPLACEMENT, JEJUNOSTOMY TUBE, PERCUTANEOUS;  Surgeon: Mandeep Park MD;  [facility]    REPLACE JEJUNOSTOMY TUBE, PERCUTANEOUS  2023    Procedure: Replace Jejunostomy Tube, Percutaneous;  Surgeon: Mandeep Park MD;  [facility]    REPLACE JEJUNOSTOMY TUBE, PERCUTANEOUS N/A 2024    Procedure: REPLACEMENT, JEJUNOSTOMY TUBE, PERCUTANEOUS;  Surgeon: Francisco Dodson MD;  [facility]    REPLACE JEJUNOSTOMY TUBE, PERCUTANEOUS N/A 10/31/2024    Procedure: REPLACEMENT, JEJUNOSTOMY TUBE, PERCUTANEOUS;  Surgeon: Mandeep Park MD;  [facility]    transphenoidal pituitary resection  1990    [facility]  DELIVERY ONLY  1996    [facility]  DELIVERY ONLY  1998    repeat c section with incidental cystotomy with repair    [facility] EXCIS PITUITARY,TRANSNASAL/SEPTAL  1980    pituitary tumor removed for diabetes insipidus    [facility] TOTAL ABDOM HYSTERECTOMY  1999    w/ bilateral salpingoophorectomy      Current Outpatient Medications   Medication Sig Dispense Refill    acetaminophen (TYLENOL) 325 MG/10.15ML solution 20.3 mLs (650 mg) by Per J Tube route every 6 hours as needed for mild pain or fever 473 mL 4    amitriptyline (ELAVIL) 10 MG tablet Take one 10mg  "tablet and one 50mg tablet together for a total daily dose of 60mg. 90 tablet 3    amitriptyline (ELAVIL) 50 MG tablet Take one 10mg tablet and one 50mg tablet together for a total daily dose of 60mg. 90 tablet 3    baclofen (LIORESAL) 10 MG tablet Take 10 mg by mouth 3 times daily as needed for muscle spasms.      cetirizine (ZYRTEC) 10 MG tablet Take 1 tablet (10 mg) by mouth every morning.      Continuous Glucose Sensor (DEXCOM G7 SENSOR) MISC Change every 10 days. 9 each 4    diphenhydrAMINE (BENADRYL) 12.5 MG/5ML solution Take 25 mg by mouth 4 times daily as needed for other (nausea)      droNABinol (MARINOL) 5 MG capsule Take 1 capsule (5 mg) by mouth 2 times daily as needed. 60 capsule 0    Emergency Supply Kit, Central, Patient use for emergency only. Contents: 3 sodium chloride 0.9% flushes, 1 dressing kit, 1 microclave ext set 14\", 4 nitrile gloves (med), 6 alcohol prep pads, 1 bacitracin, 1 syringe (10 cc 20 G 1\"). Call [phone] to reorder. [id number] kit 0    estradiol (VAGIFEM) 10 MCG TABS vaginal tablet INSERT 1 TABLET(10 MCG) VAGINALLY 2 TIMES A WEEK 24 tablet 3    famotidine (PEPCID) 20 MG tablet Take 1 tablet (20 mg) by mouth daily. 90 tablet 1    fluconazole (DIFLUCAN) 200 MG tablet Take 2 tablets by mouth on day one and then 1 tablet by mouth daily for an additional 13 days 15 tablet 0    glucagon 1 MG kit Give 0.1 to 0.15mg( 10-15 units on Insulin sryinge) subcutaneous  every 15 minutes PRN for hypoglycemia. Remix new kit q24hr. Needs up to 3 kit/week. 10 each 3    insulin aspart (NOVOLOG FLEXPEN) 100 UNIT/ML pen Take 2 units with meals OR use insulin carbohydrate ratio 1 unit per 15 grams of carbohydrates three time daily with meals/snacks plus correction scale 1 unit per 50 >140 three times daily before meals and at bedtime (ok to correct >140 at bedtime since TPN is running overnight). Total daily Novolog: ~ 15 units/day 15 mL 5    insulin aspart (NOVOLOG VIAL) 100 UNITS/ML vial Uses up to " "67 units daily for carb coverage, correction, and priming through insulin pump 60 mL 4    insulin glargine (LANTUS PEN) 100 UNIT/ML pen Inject 3 Units subcutaneously daily. (Patient taking differently: Inject 3 Units subcutaneously daily as needed for other.) 3 mL 3    insulin lispro (HUMALOG LORELEI KWIKPEN) 100 UNIT/ML (0.5 unit dial) KWIKPEN Take 0.5 units per 15 grams of carbohydrate, and correction as directed, up to 20 units per day supply. 3 mL 3    insulin syringe-needle U-100 (30G X 1/2\" 0.3 ML) 30G X 1/2\" 0.3 ML miscellaneous Use 3 syringes daily or as directed. 100 each 11    insulin syringe-needle U-100 (31G X 5/16\" 0.3 ML) 31G X 5/16\" 0.3 ML miscellaneous Use 3 syringes daily or as directed. 100 each 11    lactated ringers in 1,000 mL via CADD pump Infuse into the vein daily as needed (dehydration). Remove air via CADD pump. Infuse 1 bag(s) (1000 mL). Each bag to infuse over 2 hours. Reservoir Volume 1000 mL. Continuous rate: 500 mL/hr. 519489 mL 0    lactated ringers infusion Inject 1,000 mLs into the vein daily as needed      levothyroxine (SYNTHROID/LEVOTHROID) 175 MCG tablet Take 1 tablet (175 mcg) by mouth daily. 90 tablet 3    lidocaine (LIDODERM) 5 % patch Place onto the skin as needed. To prevent lidocaine toxicity, patient should be patch free for 12 hrs daily.      lipase-protease-amylase (CREON) 35139-66249-100947 units CPEP per EC capsule Take 5- 7 caps with 3 meals a day and with 2 snacks a day 2250 capsule 3    methocarbamol (ROBAXIN) 750 MG tablet Take 1 tablet (750 mg) by mouth 4 times daily as needed for muscle spasms. 120 tablet 11    montelukast (SINGULAIR) 10 MG tablet Take 10 mg by mouth at bedtime.      omeprazole (PRILOSEC) 40 MG DR capsule Take 1 capsule (40 mg) by mouth 2 times daily. 180 capsule 2    Ostomy Supplies MISC 1 each every other day. 20 each 11    Ostomy Supplies MISC 20 each every other day. 20 each 11    Port Access Kit For nurse use only.  Do not remove items from " bag.  Use for port access.  Do not place syringe on sterile field. [id number] kit 0    prochlorperazine (COMPAZINE) 10 MG tablet Take 1 tablet (10 mg) by mouth every 6 hours as needed for nausea or vomiting. 120 tablet 3    promethazine (PHENERGAN) injection Add to infusion 0.5 mLs (25 mg) every 8 hours as needed for nausea or vomiting. Draw up in a syringe and add to homepump immediately prior to infusing.  Discard remainder of vial.High risk of tissue necrosis with extravasation. Must be given IM or via IVPB. (Patient taking differently: Add to infusion 25 mg every 8 hours as needed for nausea or vomiting. Draw up in a syringe and add to homepump immediately prior to infusing.  Discard remainder of vial.) 25891 mL 0    rimegepant (NURTEC) 75 MG ODT tablet Place 1 tablet (75 mg) under the tongue every 48 hours. 16 tablet 11    scopolamine (TRANSDERM) 1 MG/3DAYS 72 hr patch APPLY 1 PATCH TOPICALLY TO THE SKIN EVERY 72 HOURS 10 patch 5    Sharps Container (BD SHARPS ) MISC 1 Container as needed 1 each 1    sodium chloride 0.9% elastomeric pump Infuse 61 mLs over 15 minutes into the vein every 8 hours as needed (for use with promethazine). Add 0.5 mL (25 mg) of promethazine 25 mg/mL to homepump, then add 5 mL NaCl 0.9% to homepump to flush port, immediately prior to infusing. 644648 mL 0    sodium chloride, PF, 0.9% PF flush Inject 10 mLs into the vein as needed for line flush. Flush IV before and after med administration as directed and/or at least every 24 hours, or prior to deaccessing for no further use and/or at least every 4 weeks when not accessed. 160709 mL 0    sodium chloride, PF, 0.9% PF flush Add to infusion 5 mLs every 8 hours. Add to homepump after adding promethazine to flush port. 5400 mL 0    STATIN NOT PRESCRIBED (INTENTIONAL) Previous liver issues, risks vs benefits felt to not warrant statin.    Discussed Oct 2022 visit      SUMAtriptan (IMITREX STATDOSE) 6 MG/0.5ML pen injector kit Inject 0.5  mLs (6 mg) subcutaneously at onset of headache for migraine. May repeat in 1 hour. Max 12 mg/24 hours. 3 kit 11    zolpidem (AMBIEN) 5 MG tablet Take 1 tablet (5 mg) by mouth nightly as needed for sleep. 30 tablet 1    Continuous Glucose Sensor (FREESTYLE LISA 3 SENSOR) MISC CHANGE EVERY 14 DAYS 2 each 11    glucose 40 % GEL gel Take 15-30 g by mouth every 15 minutes as needed for low blood sugar 3 Tube 2     Current Facility-Administered Medications   Medication Dose Route Frequency Provider Last Rate Last Admin    Botulinum Toxin Type A (BOTOX) 200 units injection 200 Units  200 Units Intramuscular See Admin Instructions    200 Units at 07/21/25 1021        Allergies   Allergen Reactions    Apple Juice Anaphylaxis    Corticosteroids Other (See Comments)     All oral, IV and injectable steroids are contraindicated (unless in life threatening situations) in [facility]let [employer] transplant recipients. They can cause irreversible loss of islet cell function. Please contact patient's transplant care coordinator ADI Gaffney RN at [phone]/pager [phone] and/or endocrinologist prior to administration.      Divalproex Sodium Anaphylaxis     Chest pain    Food Anaphylaxis     Several food allergies,    Lactose Nausea and Vomiting and Cramps    Zithromax [Azithromycin Dihydrate] Anaphylaxis     Noted in 4/7/08 ER    Bromfenac      Other reaction(s): Headache    Codeine      Other reaction(s): Hallucinations    Darvocet [Propoxyphene N-Apap] Itching    Morphine Nausea and Vomiting and Rash    Nalbuphine Hcl Rash     RASH :nubaine    Zolmitriptan Rash    Bactrim [Sulfamethoxazole W-Trimethoprim] Other (See Comments) and Nausea and Vomiting     Severely low liver function.    Statins Other (See Comments)     Previous liver issues, risks vs benefits felt to not warrant statin.  Discussed Oct 2022 visit    Tape [Adhesive Tape] Blisters    Tramadol     Zofran [Ondansetron] Other (See Comments)     migraine    Flagyl  "[Metronidazole] Hives and Rash           Objective:    General: Cooperative, NAD  Neurologic:  NEURO:    Objective:   Vitals: /79  Pulse 90  Ht 1.6 m (5' 3\")  Wt 48.535 kg (107 lb)  BMI 18.96 kg/m2    General: Cooperative, thin appearing female in NAD  HEENT: neck supple, normal ROM   Extremities: Distal pulses intact in UEs  Neurologic:  Mental Status: Fully alert, attentive and oriented. Speech clear and fluent. Cranial Nerves: Visual fields intact. PERRL. EOMI with normal smooth pursuit. Facial sensation intact/symmetric. Facial movements symmetric. Hearing not formally tested but intact to conversation. Palate elevation symmetric, uvula midline. No dysarthria. Shoulder shrug strong bilaterally. No scapular winging. Tongue protrusion midline.  Motor: No abnormal movements. Normal tone throughout. No pronator drift.  Strength 3/5 -left wrist extension, left fingers extension, 4/5 forearm extension. Otherwise,  5/5 throughout  upper and lower extremities.   Deep Tendon Reflexes: 2+ and symmetric throughout. No clonus.  Sensory: Intact/symmetric to light touch throughout upper and lower extremities. Negative Romberg.   Coordination: FNF intact without dysmetria.   Station/Gait: Normal casual gait. Intact heel-to-toe walking, toes and heels walking    Pertinent Investigations:      Brain MRI in 2022 reviewed  MR BRAIN W/O & W CONTRAST 8/29/2022 1:15 PM     Provided History: Positional headache; S/P pancreatic islet cell  transplantation (H); History of pituitary surgery.     ICD-10: Positional headache; S/P pancreatic islet cell transplantation  (H); History of pituitary surgery     Comparison: 1/19/2005.     Technique: Multiplanar T1-weighted, axial FLAIR, and susceptibility  images were obtained without intravenous contrast. Following  intravenous gadolinium-based contrast administration, axial  T2-weighted, diffusion, and T1-weighted images (in multiple planes)  were obtained.     Contrast dose: 6.5 mL " Gadavist     Findings:  There is no mass effect, midline shift, or evidence of intracranial  hemorrhage. The ventricles are proportionate to the cerebral sulci.     Postcontrast images demonstrate no abnormal intracranial enhancing  lesions.  Partially empty sella. Residual adenoma with subchondral  No definite abnormality of the skull marrow signal is noted. The major  vascular intracranial flow-voids are present. The visualized portions  of paranasal sinuses, and mastoid air cells are relatively clear. The  orbits are grossly unremarkable.                                                                      Impression: No evidence of abnormal enhancing lesions intracranially.  No finding to explain patient's symptoms. Essentially a normal brain  MRI.      GOKUL ALEXANDER MD     CMP reviewed    Component  Ref Range & Units 3 mo ago  (4/14/25) 4 mo ago  (3/12/25) 6 mo ago  (1/29/25) 8 mo ago  (11/25/24) 8 mo ago  (11/19/24) 8 mo ago  (11/18/24) 8 mo ago  (11/17/24)     Sodium  135 - 145 mmol/L 141 138 136 135 137 137 139    Potassium  3.4 - 5.3 mmol/L 4.4 4.6 4.6 4.4 4.1 5.2 4.1    Carbon Dioxide (CO2)  22 - 29 mmol/L 24 27 25 24 25 22 25    Anion Gap  7 - 15 mmol/L 10 9 9 11 13 11 11    Urea Nitrogen  8.0 - 23.0 mg/dL 11.6 11.9 R 9.4 R 36.9 High  R 8.8 R 15.4 R 12.1 R    Creatinine  0.51 - 0.95 mg/dL 0.76 0.76 0.81 0.76 0.54 0.64 0.78    GFR Estimate  >60 mL/min/1.73m2 90 90 CM 84 CM 90 CM >90 CM >90 CM 88 CM   Comment: eGFR calculated using 2021 CKD-EPI equation.    Calcium  8.8 - 10.4 mg/dL 8.9 9.4 8.9 8.9 CM 9.8 CM 8.5 Low  CM 9.2 CM    Chloride  98 - 107 mmol/L 107 102 102 100 99 104 103    Glucose  70 - 99 mg/dL 120 High  103 High  100 High  254 High  130 High  106 High  51 Low     Alkaline Phosphatase  40 - 150 U/L 206 High    582 High    265 High     AST  0 - 45 U/L 25   51 High    30    ALT  0 - 50 U/L 27   118 High    48    Protein Total  6.4 - 8.3 g/dL 6.5   7.0   7.1    Albumin  3.5 - 5.2 g/dL 3.8   3.3  Low    3.8    Bilirubin Total  <=1.2 mg/dL 0.2   0.3   0.2   Resulting Agency [facility]

## 2025-07-28 NOTE — LETTER
7/28/2025       RE: Dinora Mcghee  [address]  [place] [address]     Dear Colleague,    Thank you for referring your patient, Dinora Mcghee, to the [facility] [place] at [facility]. Please see a copy of my visit note below.    [facility]    Headache Neurology Progress Note  July 28, 2025      Assessment/Plan:   Dinora Mcghee is a 60 year old   Left hand weakness and left upper extremity paresthesia most pronounced in the half of the third, whole forth and fifth digits, medial aspect of left upper extremity. New acute onset 4 days ago. Exam findings of weakness with left hand , finger extension. Differentials radiculopathy, plexopathy, ulnar and/or radial neuropathy  Plan:  EMG to help to localize the lesion   Cervical MRI with and without a contrast   Follow up after imaging and EMG for reassessment     51 minutes spent on the date of the encounter doing face to face access, chart  review, exam, results review,  meds review, treatment plan, documentation and further activities as noted above    NATALY Andrew, CNP [facility]    Subjective:    Dinora Mcghee returns for follow up of a new neurological problem  Accompanied by .   Patient has been seen in our Headache Clinic for headache management and receiving Botox since 2022. Botox has been beneficial for migraine management and no side effects in the past 3 years.   Last Botox with chronic migraine/PREEMPT  protocoloL 7/21/2025  New symptoms  Reports that 4 days after the Botox Was sitting at the desk and dose up and than went to bed and woke up with the numbness. No numbness or weakness in her hand before falling asleep.   Started as numbness and tingling and pressure pain in the fingers. Pressure in the fingers and in veliz surface at back of the head of the last 3 digits. -half of the 3d finger and 4th and  5th finger. Numbness feeling Goes along back of the arm and ends  at the lateral neck area. Symptoms the same for 4 days the same. Affecting daily activities-hard to type, knit, write, hold objects  Right side trap after the Botox but no bruising on the left side. Left shoulder tingling but can move up and range of motion is normal. No problems with lifting arm up.     Endoscopy with anesthesia on  and [employer] work on Wednesday - -neck work up and core work -routine. No injuries or any other out of routine activities.     History of surgical carpal tunnel syndrome release 30 years ago    PMH:  Past Medical History:   Diagnosis Date     Allergic rhinitis, cause unspecified      Allergy to other foods     strawberries, apples, celeries, alice, watermelon     Arthritis     left knee     Choledocholithiasis     long after cholecystectomy     Chronic abdominal pain      Chronic constipation      Chronic nausea      Chronic pancreatitis (H)      Degeneration of lumbar or lumbosacral intervertebral disc      Depressive disorder     Situational. 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 endometriosis growth     APPENDECTOMY        SECTION  1993     CHOLECYSTECTOMY       COLONOSCOPY  2014     COLONOSCOPY N/A 2023    Procedure: COLONOSCOPY, WITH POLYPECTOMY AND  BIOPSY;  Surgeon: Percy Chamorro MD;  [facility]     ENDOSCOPIC INSERTION TUBE GASTROSTOMY N/A 11/28/2021    Procedure: Gastrojejonostomy placement with jejunopexy, PEG tube exchange;  Surgeon: Zackery Montoya MD;  [facility]     ENDOSCOPIC RETROGRADE CHOLANGIOPANCREATOGRAM N/A 06/02/2015    Procedure: ENDOSCOPIC RETROGRADE CHOLANGIOPANCREATOGRAM;  Surgeon: Mandeep Park MD;  [facility]     ENDOSCOPIC RETROGRADE CHOLANGIOPANCREATOGRAM N/A 02/09/2016    Procedure: COMBINED ENDOSCOPIC RETROGRADE CHOLANGIOPANCREATOGRAPHY, PLACE TUBE/STENT;  Surgeon: Mandeep Park MD;  [facility]     ENDOSCOPIC RETROGRADE CHOLANGIOPANCREATOGRAM N/A 03/17/2016    Procedure: COMBINED ENDOSCOPIC RETROGRADE CHOLANGIOPANCREATOGRAPHY, REMOVE FOREIGN BODY OR STENT/TUBE;  Surgeon: Mandeep Park MD;  [facility]     ENDOSCOPIC RETROGRADE CHOLANGIOPANCREATOGRAM N/A 08/02/2016    Procedure: COMBINED ENDOSCOPIC RETROGRADE CHOLANGIOPANCREATOGRAPHY, PLACE TUBE/STENT;  Surgeon: Mandeep Park MD;  [facility]     ENDOSCOPIC RETROGRADE CHOLANGIOPANCREATOGRAM N/A 08/26/2016    Procedure: COMBINED ENDOSCOPIC RETROGRADE CHOLANGIOPANCREATOGRAPHY, REMOVE FOREIGN BODY OR STENT/TUBE;  Surgeon: Mandeep Park MD;  [facility]     ENDOSCOPIC ULTRASOUND UPPER GASTROINTESTINAL TRACT (GI) N/A 10/03/2016    Procedure: ENDOSCOPIC ULTRASOUND, ESOPHAGOSCOPY / UPPER GASTROINTESTINAL TRACT (GI);  Surgeon: Guru Jose Rodas MD;  [facility]     ENT SURGERY  1989    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     ENTEROSCOPY SMALL BOWEL N/A 02/03/2022    Procedure: ENTEROSCOPY with possible fistula closure;  Surgeon: Francisco Dodson MD;  Location:  GI     ENTEROSCOPY SMALL BOWEL N/A 09/11/2024    Procedure: Upper endoscopy, gastrostomy tube placement and jejunostomy tube exchange;  Surgeon: Zackery Montoya MD;  [facility]     ESOPHAGOSCOPY, GASTROSCOPY,  DUODENOSCOPY (EGD), COMBINED N/A 06/24/2015    Procedure: COMBINED ESOPHAGOSCOPY, GASTROSCOPY, DUODENOSCOPY (EGD), REMOVE FOREIGN BODY;  Surgeon: Mandeep Park MD;  [facility]     ESOPHAGOSCOPY, GASTROSCOPY, DUODENOSCOPY (EGD), COMBINED N/A 10/25/2015    Procedure: COMBINED ESOPHAGOSCOPY, GASTROSCOPY, DUODENOSCOPY (EGD);  Surgeon: Sammy Amaro MD;  [facility]     ESOPHAGOSCOPY, GASTROSCOPY, DUODENOSCOPY (EGD), COMBINED N/A 10/25/2015    Procedure: COMBINED ESOPHAGOSCOPY, GASTROSCOPY, DUODENOSCOPY (EGD), BIOPSY SINGLE OR MULTIPLE;  Surgeon: Sammy Amaro MD;  [facility]     ESOPHAGOSCOPY, GASTROSCOPY, DUODENOSCOPY (EGD), COMBINED N/A 01/31/2023    Procedure: ESOPHAGOGASTRODUODENOSCOPY (EGD) with peg replacement ;  Surgeon: Mandeep Park MD;  [facility]     ESOPHAGOSCOPY, GASTROSCOPY, DUODENOSCOPY (EGD), COMBINED N/A 07/24/2024    Procedure: Esophagoscopy, gastroscopy, duodenoscopy (EGD), combined;  Surgeon: Zackery Montoya MD;  [facility]     ESOPHAGOSCOPY, GASTROSCOPY, DUODENOSCOPY (EGD), COMBINED N/A 10/31/2024    Procedure: Esophagoscopy, gastroscopy, duodenoscopy (EGD), combined;  Surgeon: Mandeep Park MD;  [facility]     ESOPHAGOSCOPY, GASTROSCOPY, DUODENOSCOPY (EGD), COMBINED N/A 7/22/2025    Procedure: Esophagoscopy, gastroscopy, duodenoscopy (EGD), combined with biopsy and balloon dilation;  Surgeon: Saleem Schaefer MD;  [facility]     ESOPHAGOSCOPY, GASTROSCOPY, DUODENOSCOPY (EGD), DILATATION, COMBINED       EXCISE LESION TRUNK N/A 04/17/2017    Procedure: EXCISE LESION TRUNK;  Removal of Abdominal Foreign Body;  Surgeon: Nestor Phoenix MD;  [facility]     GENITOURINARY SURGERY  1999    Hysterectomy     HC ESOPH/GAS REFLUX TEST W NASAL IMPED >1 HR N/A 11/19/2015    Procedure: ESOPHAGEAL IMPEDENCE FUNCTION TEST WITH 24 HOUR PH GREATER THAN 1 HOUR;  Surgeon: Thiago Apple MD;  Location:  GI       [facility] ENDOSCOPY DIAG W BIOPSY  09/17/2008      [facility] ENDOSCOPY DIAG W BIOPSY  09/27/2012     HC [facility] ENDOSCOPY W ESOPHAGEAL DILATION BALLOON <30MM  09/17/2008      [facility] ENDOSCOPY W EUS N/A 05/05/2015    Procedure: COMBINED ENDOSCOPIC ULTRASOUND, ESOPHAGOSCOPY, GASTROSCOPY, DUODENOSCOPY (EGD);  Surgeon: Wm Dueñas MD;  [facility]     HC WRIST ARTHROSCOP,RELEASE XVERS LIG Bilateral 12/17/2008     INJECT TRANSVERSUS ABDOMINIS PLANE (TAP) BLOCK BILATERAL Left 09/22/2016    Procedure: INJECT TRANSVERSUS ABDOMINIS PLANE (TAP) BLOCK BILATERAL;  Surgeon: Dickson Corrigan MD;  [facility]     INJECT TRIGGER POINT Bilateral 09/08/2022    Procedure: Abdominal trigger point injection with ultrasound;  Surgeon: Monika Mahajan MD;  [facility]     INJECT TRIGGER POINT SINGLE / MULTIPLE 3 OR MORE MUSCLES Right 11/15/2022    Procedure: Trigger point injections right abdomen with ultrasound guidance;  Surgeon: Monika Mahajan MD;  [facility]: [facility]     IR CHEST PORT PLACEMENT < 5 YRS OF AGE  06/10/2022     IR CHEST PORT PLACEMENT > 5 YRS OF AGE  01/29/2025     IR CVC TUNNEL PLACEMENT > 5 YRS OF AGE  04/15/2024     IR CVC TUNNEL REMOVAL RIGHT  01/29/2025     IR PORT REMOVAL RIGHT  11/09/2023     LAPAROSCOPIC ASSISTED COLECTOMY N/A 10/11/2024    Procedure: Exploratory laparotomy, extensive lysis of adhesions, revision of Jtube and small bowel resection, TOTAL ABDOMINAL COLECTOMY WITH ILEORECTAL ANASTAMOSIS, DIVERTING LOOP ILEOSTOMY, flexible sigmoidoscopy;  Surgeon: Kaylene Branch MD;  [facility]     laparoscopic pineda  01/01/1995     LAPAROSCOPIC HERNIORRHAPHY INCISIONAL N/A 08/23/2018    Procedure: LAPAROSCOPIC HERNIORRHAPHY INCISIONAL;  Laparoscopic Incisional Hernia Repair with Symbotex Mesh Implant;  Surgeon: Nestor Phoenix MD;  [facility]     LAPAROSCOPIC PANCREATECTOMY, TRANSPLANT AUTO ISLET CELL N/A 12/28/2016    Procedure: LAPAROSCOPIC PANCREATECTOMY, TRANSPLANT AUTO ISLET CELL;   Surgeon: Nestor Phoenix MD;  [facility]     LAPAROTOMY EXPLORATORY N/A 01/31/2023    Procedure: Exploratory Laparotomy, lysis of adhesions, Perforated J-Junostomy Resection, Replace J-Junostomy site;  Surgeon: Elver Bauer MD;  [facility]     LAPAROTOMY, LYSIS ADHESIONS, COMBINED N/A 01/31/2023    Procedure: Dilatation of jejunostomy feeding tube, track with placement of jejunostomy tube with fluoroscopy;  Surgeon: Elver Bauer MD;  [facility]     PICC DOUBLE LUMEN PLACEMENT Left 11/13/2023    Basilic Vein 5F DL 43 cm     REMOVE AND REPLACE BREAST IMPLANT PROSTHESIS N/A 12/30/2022    Procedure: Exploratory Laparotomy, lysis of adhesions, small bowel resection. Placement of gastric jejunostomy for enteral feeding.;  Surgeon: Elver Bauer MD;  [facility]     REMOVE GASTROSTOMY TUBE ADULT N/A 01/28/2022    Procedure: REMOVAL, GASTROSTOMY TUBE, ADULT;  Surgeon: Mandeep Park MD;  [facility]     REMOVE GASTROSTOMY TUBE ADULT N/A 12/02/2024    Procedure: Remove gastrostomy tube adult- PEG;  Surgeon: Mandeep Park MD;  [facility]     REPLACE GASTROJEJUNOSTOMY TUBE, PERCUTANEOUS N/A 09/07/2021    Procedure: GASTROJEJUNOSTOMY TUBE PLACEMENT, PERCUTANEOUS, WITH GASTROPEXY;  Surgeon: Mandeep Park MD;  [facility]     REPLACE GASTROJEJUNOSTOMY TUBE, PERCUTANEOUS N/A 09/22/2021    Procedure: REPLACEMENT, GASTROJEJUNOSTOMY TUBE, PERCUTANEOUS;  Surgeon: Zackery Montoya MD;  [facility]     REPLACE GASTROJEJUNOSTOMY TUBE, PERCUTANEOUS N/A 11/22/2022    Procedure: REPLACEMENT, GASTROJEJUNOSTOMY TUBE, PERCUTANEOUS;  Surgeon: Mandeep Park MD;  [facility]     REPLACE GASTROJEJUNOSTOMY TUBE, PERCUTANEOUS N/A 12/09/2022    Procedure: REPLACEMENT, GASTROJEJUNOSTOMY TUBE, PERCUTANEOUS with ENDOSCOPIC SUTURING.;  Surgeon: Mandeep Park MD;  [facility]     REPLACE GASTROJEJUNOSTOMY TUBE, PERCUTANEOUS N/A  2023    Procedure: Replace Gastrojejunostomy Tube, Percutaneous;  Surgeon: Mandeep Park MD;  [facility]     REPLACE GASTROJEJUNOSTOMY TUBE, PERCUTANEOUS N/A 2023    Procedure: Replace Gastrojejunostomy Tube, Percutaneous;  Surgeon: Francisco Dodson MD;  [facility]     REPLACE GASTROJEJUNOSTOMY TUBE, PERCUTANEOUS N/A 2023    Procedure: Replace Gastrojejunostomy Tube, Percutaneous;  Surgeon: Mandeep Park MD;  [facility]     REPLACE GASTROSTOMY TUBE, PERCUTANEOUS N/A 10/31/2024    Procedure: REPLACEMENT, GASTROSTOMY TUBE, PERCUTANEOUS;  Surgeon: Mandeep Park MD;  [facility]     REPLACE JEJUNOSTOMY TUBE, PERCUTANEOUS N/A 09/10/2021    Procedure: UPPER ENDOSCOPY, REPLACEMENT OF PERCUTANEOUS GASTROJEJUNOSTOMY TUBE, T-TAG GASTROPEXY;  Surgeon: Zackery Montoya MD;  [facility]     REPLACE JEJUNOSTOMY TUBE, PERCUTANEOUS N/A 2021    Procedure: REPLACEMENT, JEJUNOSTOMY TUBE, PERCUTANEOUS;  Surgeon: Mandeep Park MD;  [facility]     REPLACE JEJUNOSTOMY TUBE, PERCUTANEOUS N/A 2023    Procedure: REPLACEMENT, JEJUNOSTOMY TUBE, PERCUTANEOUS;  Surgeon: Mandeep Park MD;  [facility]     REPLACE JEJUNOSTOMY TUBE, PERCUTANEOUS  2023    Procedure: Replace Jejunostomy Tube, Percutaneous;  Surgeon: Mandeep Park MD;  [facility]     REPLACE JEJUNOSTOMY TUBE, PERCUTANEOUS N/A 2024    Procedure: REPLACEMENT, JEJUNOSTOMY TUBE, PERCUTANEOUS;  Surgeon: Francisco Dodson MD;  [facility]     REPLACE JEJUNOSTOMY TUBE, PERCUTANEOUS N/A 10/31/2024    Procedure: REPLACEMENT, JEJUNOSTOMY TUBE, PERCUTANEOUS;  Surgeon: Mandeep Park MD;  [facility]     transphenoidal pituitary resection  1990     [facility]  DELIVERY ONLY  1996     [facility]  DELIVERY ONLY  1998    repeat c section with incidental cystotomy with repair     [facility] EXCIS PITUITARY,TRANSNASAL/SEPTAL  1980     "pituitary tumor removed for diabetes insipidus     [facility] TOTAL ABDOM HYSTERECTOMY  01/01/1999    w/ bilateral salpingoophorectomy      Current Outpatient Medications   Medication Sig Dispense Refill     acetaminophen (TYLENOL) 325 MG/10.15ML solution 20.3 mLs (650 mg) by Per J Tube route every 6 hours as needed for mild pain or fever 473 mL 4     amitriptyline (ELAVIL) 10 MG tablet Take one 10mg tablet and one 50mg tablet together for a total daily dose of 60mg. 90 tablet 3     amitriptyline (ELAVIL) 50 MG tablet Take one 10mg tablet and one 50mg tablet together for a total daily dose of 60mg. 90 tablet 3     baclofen (LIORESAL) 10 MG tablet Take 10 mg by mouth 3 times daily as needed for muscle spasms.       cetirizine (ZYRTEC) 10 MG tablet Take 1 tablet (10 mg) by mouth every morning.       Continuous Glucose Sensor (DEXCOM G7 SENSOR) MISC Change every 10 days. 9 each 4     diphenhydrAMINE (BENADRYL) 12.5 MG/5ML solution Take 25 mg by mouth 4 times daily as needed for other (nausea)       droNABinol (MARINOL) 5 MG capsule Take 1 capsule (5 mg) by mouth 2 times daily as needed. 60 capsule 0     Emergency Supply Kit, Central, Patient use for emergency only. Contents: 3 sodium chloride 0.9% flushes, 1 dressing kit, 1 microclave ext set 14\", 4 nitrile gloves (med), 6 alcohol prep pads, 1 bacitracin, 1 syringe (10 cc 20 G 1\"). Call [phone] to reorder. 121983 kit 0     estradiol (VAGIFEM) 10 MCG TABS vaginal tablet INSERT 1 TABLET(10 MCG) VAGINALLY 2 TIMES A WEEK 24 tablet 3     famotidine (PEPCID) 20 MG tablet Take 1 tablet (20 mg) by mouth daily. 90 tablet 1     fluconazole (DIFLUCAN) 200 MG tablet Take 2 tablets by mouth on day one and then 1 tablet by mouth daily for an additional 13 days 15 tablet 0     glucagon 1 MG kit Give 0.1 to 0.15mg( 10-15 units on Insulin sryinge) subcutaneous  every 15 minutes PRN for hypoglycemia. Remix new kit q24hr. Needs up to 3 kit/week. 10 each 3     insulin aspart (NOVOLOG " "FLEXPEN) 100 UNIT/ML pen Take 2 units with meals OR use insulin carbohydrate ratio 1 unit per 15 grams of carbohydrates three time daily with meals/snacks plus correction scale 1 unit per 50 >140 three times daily before meals and at bedtime (ok to correct >140 at bedtime since TPN is running overnight). Total daily Novolog: ~ 15 units/day 15 mL 5     insulin aspart (NOVOLOG VIAL) 100 UNITS/ML vial Uses up to 67 units daily for carb coverage, correction, and priming through insulin pump 60 mL 4     insulin glargine (LANTUS PEN) 100 UNIT/ML pen Inject 3 Units subcutaneously daily. (Patient taking differently: Inject 3 Units subcutaneously daily as needed for other.) 3 mL 3     insulin lispro (HUMALOG LORELEI KWIKPEN) 100 UNIT/ML (0.5 unit dial) KWIKPEN Take 0.5 units per 15 grams of carbohydrate, and correction as directed, up to 20 units per day supply. 3 mL 3     insulin syringe-needle U-100 (30G X 1/2\" 0.3 ML) 30G X 1/2\" 0.3 ML miscellaneous Use 3 syringes daily or as directed. 100 each 11     insulin syringe-needle U-100 (31G X 5/16\" 0.3 ML) 31G X 5/16\" 0.3 ML miscellaneous Use 3 syringes daily or as directed. 100 each 11     lactated ringers in 1,000 mL via CADD pump Infuse into the vein daily as needed (dehydration). Remove air via CADD pump. Infuse 1 bag(s) (1000 mL). Each bag to infuse over 2 hours. Reservoir Volume 1000 mL. Continuous rate: 500 mL/hr. 163425 mL 0     lactated ringers infusion Inject 1,000 mLs into the vein daily as needed       levothyroxine (SYNTHROID/LEVOTHROID) 175 MCG tablet Take 1 tablet (175 mcg) by mouth daily. 90 tablet 3     lidocaine (LIDODERM) 5 % patch Place onto the skin as needed. To prevent lidocaine toxicity, patient should be patch free for 12 hrs daily.       lipase-protease-amylase (CREON) 42583-68480-303627 units CPEP per EC capsule Take 5- 7 caps with 3 meals a day and with 2 snacks a day 2250 capsule 3     methocarbamol (ROBAXIN) 750 MG tablet Take 1 tablet (750 mg) by " mouth 4 times daily as needed for muscle spasms. 120 tablet 11     montelukast (SINGULAIR) 10 MG tablet Take 10 mg by mouth at bedtime.       omeprazole (PRILOSEC) 40 MG DR capsule Take 1 capsule (40 mg) by mouth 2 times daily. 180 capsule 2     Ostomy Supplies MISC 1 each every other day. 20 each 11     Ostomy Supplies MISC 20 each every other day. 20 each 11     Port Access Kit For nurse use only.  Do not remove items from bag.  Use for port access.  Do not place syringe on sterile field. [id number] kit 0     prochlorperazine (COMPAZINE) 10 MG tablet Take 1 tablet (10 mg) by mouth every 6 hours as needed for nausea or vomiting. 120 tablet 3     promethazine (PHENERGAN) injection Add to infusion 0.5 mLs (25 mg) every 8 hours as needed for nausea or vomiting. Draw up in a syringe and add to homepump immediately prior to infusing.  Discard remainder of vial.High risk of tissue necrosis with extravasation. Must be given IM or via IVPB. (Patient taking differently: Add to infusion 25 mg every 8 hours as needed for nausea or vomiting. Draw up in a syringe and add to homepump immediately prior to infusing.  Discard remainder of vial.) 76449 mL 0     rimegepant (NURTEC) 75 MG ODT tablet Place 1 tablet (75 mg) under the tongue every 48 hours. 16 tablet 11     scopolamine (TRANSDERM) 1 MG/3DAYS 72 hr patch APPLY 1 PATCH TOPICALLY TO THE SKIN EVERY 72 HOURS 10 patch 5     Sharps Container (BD SHARPS ) MISC 1 Container as needed 1 each 1     sodium chloride 0.9% elastomeric pump Infuse 61 mLs over 15 minutes into the vein every 8 hours as needed (for use with promethazine). Add 0.5 mL (25 mg) of promethazine 25 mg/mL to homepump, then add 5 mL NaCl 0.9% to homepump to flush port, immediately prior to infusing. 787056 mL 0     sodium chloride, PF, 0.9% PF flush Inject 10 mLs into the vein as needed for line flush. Flush IV before and after med administration as directed and/or at least every 24 hours, or prior to  deaccessing for no further use and/or at least every 4 weeks when not accessed. 514243 mL 0     sodium chloride, PF, 0.9% PF flush Add to infusion 5 mLs every 8 hours. Add to homepump after adding promethazine to flush port. 5400 mL 0     STATIN NOT PRESCRIBED (INTENTIONAL) Previous liver issues, risks vs benefits felt to not warrant statin.    Discussed Oct 2022 visit       SUMAtriptan (IMITREX STATDOSE) 6 MG/0.5ML pen injector kit Inject 0.5 mLs (6 mg) subcutaneously at onset of headache for migraine. May repeat in 1 hour. Max 12 mg/24 hours. 3 kit 11     zolpidem (AMBIEN) 5 MG tablet Take 1 tablet (5 mg) by mouth nightly as needed for sleep. 30 tablet 1     Continuous Glucose Sensor (FREESTYLE LISA 3 SENSOR) MISC CHANGE EVERY 14 DAYS 2 each 11     glucose 40 % GEL gel Take 15-30 g by mouth every 15 minutes as needed for low blood sugar 3 Tube 2     Current Facility-Administered Medications   Medication Dose Route Frequency Provider Last Rate Last Admin     Botulinum Toxin Type A (BOTOX) 200 units injection 200 Units  200 Units Intramuscular See Admin Instructions    200 Units at 07/21/25 1021        Allergies   Allergen Reactions     Apple Juice Anaphylaxis     Corticosteroids Other (See Comments)     All oral, IV and injectable steroids are contraindicated (unless in life threatening situations) in [facility] [employer] transplant recipients. They can cause irreversible loss of islet cell function. Please contact patient's transplant care coordinator ADI Gaffney RN at [phone]/pager [phone] and/or endocrinologist prior to administration.       Divalproex Sodium Anaphylaxis     Chest pain     Food Anaphylaxis     Several food allergies,     Lactose Nausea and Vomiting and Cramps     Zithromax [Azithromycin Dihydrate] Anaphylaxis     Noted in 4/7/08 ER     Bromfenac      Other reaction(s): Headache     Codeine      Other reaction(s): Hallucinations     Darvocet [Propoxyphene N-Apap] Itching     Morphine Nausea  "and Vomiting and Rash     Nalbuphine Hcl Rash     RASH :nubaine     Zolmitriptan Rash     Bactrim [Sulfamethoxazole W-Trimethoprim] Other (See Comments) and Nausea and Vomiting     Severely low liver function.     Statins Other (See Comments)     Previous liver issues, risks vs benefits felt to not warrant statin.  Discussed Oct 2022 visit     Tape [Adhesive Tape] Blisters     Tramadol      Zofran [Ondansetron] Other (See Comments)     migraine     Flagyl [Metronidazole] Hives and Rash           Objective:    General: Cooperative, NAD  Neurologic:  NEURO:    Objective:   Vitals: /79  Pulse 90  Ht 1.6 m (5' 3\")  Wt 48.535 kg (107 lb)  BMI 18.96 kg/m2    General: Cooperative, thin appearing female in NAD  HEENT: neck supple, normal ROM   Extremities: Distal pulses intact in UEs  Neurologic:  Mental Status: Fully alert, attentive and oriented. Speech clear and fluent. Cranial Nerves: Visual fields intact. PERRL. EOMI with normal smooth pursuit. Facial sensation intact/symmetric. Facial movements symmetric. Hearing not formally tested but intact to conversation. Palate elevation symmetric, uvula midline. No dysarthria. Shoulder shrug strong bilaterally. No scapular winging. Tongue protrusion midline.  Motor: No abnormal movements. Normal tone throughout. No pronator drift.  Strength 3/5 -left wrist extension, left fingers extension, 4/5 forearm extension. Otherwise,  5/5 throughout  upper and lower extremities.   Deep Tendon Reflexes: 2+ and symmetric throughout. No clonus.  Sensory: Intact/symmetric to light touch throughout upper and lower extremities. Negative Romberg.   Coordination: FNF intact without dysmetria.   Station/Gait: Normal casual gait. Intact heel-to-toe walking, toes and heels walking    Pertinent Investigations:      Brain MRI in 2022 reviewed  MR BRAIN W/O & W CONTRAST 8/29/2022 1:15 PM     Provided History: Positional headache; S/P pancreatic islet cell  transplantation (H); History of " pituitary surgery.     ICD-10: Positional headache; S/P pancreatic islet cell transplantation  (H); History of pituitary surgery     Comparison: 1/19/2005.     Technique: Multiplanar T1-weighted, axial FLAIR, and susceptibility  images were obtained without intravenous contrast. Following  intravenous gadolinium-based contrast administration, axial  T2-weighted, diffusion, and T1-weighted images (in multiple planes)  were obtained.     Contrast dose: 6.5 mL Gadavist     Findings:  There is no mass effect, midline shift, or evidence of intracranial  hemorrhage. The ventricles are proportionate to the cerebral sulci.     Postcontrast images demonstrate no abnormal intracranial enhancing  lesions.  Partially empty sella. Residual adenoma with subchondral  No definite abnormality of the skull marrow signal is noted. The major  vascular intracranial flow-voids are present. The visualized portions  of paranasal sinuses, and mastoid air cells are relatively clear. The  orbits are grossly unremarkable.                                                                      Impression: No evidence of abnormal enhancing lesions intracranially.  No finding to explain patient's symptoms. Essentially a normal brain  MRI.      GOKUL ALEXANDER MD     CMP reviewed    Component  Ref Range & Units 3 mo ago  (4/14/25) 4 mo ago  (3/12/25) 6 mo ago  (1/29/25) 8 mo ago  (11/25/24) 8 mo ago  (11/19/24) 8 mo ago  (11/18/24) 8 mo ago  (11/17/24)     Sodium  135 - 145 mmol/L 141 138 136 135 137 137 139    Potassium  3.4 - 5.3 mmol/L 4.4 4.6 4.6 4.4 4.1 5.2 4.1    Carbon Dioxide (CO2)  22 - 29 mmol/L 24 27 25 24 25 22 25    Anion Gap  7 - 15 mmol/L 10 9 9 11 13 11 11    Urea Nitrogen  8.0 - 23.0 mg/dL 11.6 11.9 R 9.4 R 36.9 High  R 8.8 R 15.4 R 12.1 R    Creatinine  0.51 - 0.95 mg/dL 0.76 0.76 0.81 0.76 0.54 0.64 0.78    GFR Estimate  >60 mL/min/1.73m2 90 90 CM 84 CM 90 CM >90 CM >90 CM 88 CM   Comment: eGFR calculated using 2021 CKD-EPI equation.     Calcium  8.8 - 10.4 mg/dL 8.9 9.4 8.9 8.9 CM 9.8 CM 8.5 Low  CM 9.2 CM    Chloride  98 - 107 mmol/L 107 102 102 100 99 104 103    Glucose  70 - 99 mg/dL 120 High  103 High  100 High  254 High  130 High  106 High  51 Low     Alkaline Phosphatase  40 - 150 U/L 206 High    582 High    265 High     AST  0 - 45 U/L 25   51 High    30    ALT  0 - 50 U/L 27   118 High    48    Protein Total  6.4 - 8.3 g/dL 6.5   7.0   7.1    Albumin  3.5 - 5.2 g/dL 3.8   3.3 Low    3.8    Bilirubin Total  <=1.2 mg/dL 0.2   0.3   0.2   Resulting Agency [facility] - CORE LAB [facility]           Again, thank you for allowing me to participate in the care of your patient.      Sincerely,    NATALY Hoffman CNP

## 2025-07-29 ENCOUNTER — TRANSFERRED RECORDS (OUTPATIENT)
Dept: HEALTH INFORMATION MANAGEMENT | Facility: CLINIC | Age: 60
End: 2025-07-29
Payer: COMMERCIAL

## 2025-07-30 ENCOUNTER — HOSPITAL ENCOUNTER (OUTPATIENT)
Dept: MRI IMAGING | Facility: HOSPITAL | Age: 60
Discharge: HOME | End: 2025-07-30
Attending: NURSE PRACTITIONER
Payer: COMMERCIAL

## 2025-07-30 DIAGNOSIS — R29.898 LEFT ARM WEAKNESS: ICD-10-CM

## 2025-07-30 DIAGNOSIS — R20.2 PARESTHESIA: ICD-10-CM

## 2025-07-30 PROCEDURE — 255N000002 HC RX 255 OP 636: Performed by: NURSE PRACTITIONER

## 2025-07-30 PROCEDURE — 72156 MRI NECK SPINE W/O & W/DYE: CPT

## 2025-07-30 PROCEDURE — A9585 GADOBUTROL INJECTION: HCPCS | Performed by: NURSE PRACTITIONER

## 2025-07-30 RX ORDER — GADOBUTROL 604.72 MG/ML
7 INJECTION INTRAVENOUS ONCE
Status: COMPLETED | OUTPATIENT
Start: 2025-07-30 | End: 2025-07-30

## 2025-07-30 RX ADMIN — GADOBUTROL 7 ML: 604.72 INJECTION INTRAVENOUS at 18:26

## 2025-07-31 ENCOUNTER — HOME INFUSION BILLING (OUTPATIENT)
Dept: HOME HEALTH SERVICES | Facility: HOME HEALTH | Age: 60
End: 2025-07-31
Payer: COMMERCIAL

## 2025-07-31 ENCOUNTER — HOME INFUSION (OUTPATIENT)
Dept: HOME HEALTH SERVICES | Facility: HOME HEALTH | Age: 60
End: 2025-07-31
Payer: COMMERCIAL

## 2025-08-03 DIAGNOSIS — D18.09 VERTEBRAL BODY HEMANGIOMA: Primary | ICD-10-CM

## 2025-08-13 ENCOUNTER — HOME INFUSION BILLING (OUTPATIENT)
Dept: HOME HEALTH SERVICES | Facility: HOME HEALTH | Age: 60
End: 2025-08-13
Payer: COMMERCIAL

## 2025-08-14 ENCOUNTER — MYC REFILL (OUTPATIENT)
Dept: INTERNAL MEDICINE | Facility: CLINIC | Age: 60
End: 2025-08-14
Payer: COMMERCIAL

## 2025-08-14 DIAGNOSIS — R62.7 ADULT FAILURE TO THRIVE: ICD-10-CM

## 2025-08-14 DIAGNOSIS — Z87.19 HISTORY OF FOOD INTOLERANCE: ICD-10-CM

## 2025-08-14 DIAGNOSIS — E46 MALNUTRITION, UNSPECIFIED TYPE: ICD-10-CM

## 2025-08-18 ENCOUNTER — MYC MEDICAL ADVICE (OUTPATIENT)
Dept: INTERNAL MEDICINE | Facility: CLINIC | Age: 60
End: 2025-08-18

## 2025-08-18 ENCOUNTER — APPOINTMENT (OUTPATIENT)
Dept: LAB | Facility: CLINIC | Age: 60
End: 2025-08-18
Payer: COMMERCIAL

## 2025-08-18 ENCOUNTER — OFFICE VISIT (OUTPATIENT)
Dept: NEUROLOGY | Facility: CLINIC | Age: 60
End: 2025-08-18
Payer: COMMERCIAL

## 2025-08-18 DIAGNOSIS — R20.2 PARESTHESIA: ICD-10-CM

## 2025-08-18 DIAGNOSIS — R29.898 LEFT ARM WEAKNESS: ICD-10-CM

## 2025-08-18 DIAGNOSIS — G56.22 NEURITIS OF LEFT ULNAR NERVE: Primary | ICD-10-CM

## 2025-08-18 PROCEDURE — 95886 MUSC TEST DONE W/N TEST COMP: CPT | Performed by: PSYCHIATRY & NEUROLOGY

## 2025-08-18 PROCEDURE — 95909 NRV CNDJ TST 5-6 STUDIES: CPT | Performed by: PSYCHIATRY & NEUROLOGY

## 2025-08-20 ENCOUNTER — HOME INFUSION BILLING (OUTPATIENT)
Dept: HOME HEALTH SERVICES | Facility: HOME HEALTH | Age: 60
End: 2025-08-20
Payer: COMMERCIAL

## 2025-08-20 RX ORDER — DRONABINOL 5 MG/1
5 CAPSULE ORAL 2 TIMES DAILY PRN
Qty: 60 CAPSULE | Refills: 2 | Status: SHIPPED | OUTPATIENT
Start: 2025-08-20

## 2025-08-27 ENCOUNTER — HOME INFUSION BILLING (OUTPATIENT)
Dept: HOME HEALTH SERVICES | Facility: HOME HEALTH | Age: 60
End: 2025-08-27
Payer: COMMERCIAL

## 2025-09-04 ENCOUNTER — HOME INFUSION BILLING (OUTPATIENT)
Dept: HOME HEALTH SERVICES | Facility: HOME HEALTH | Age: 60
End: 2025-09-04
Payer: COMMERCIAL

## (undated) DEVICE — GOWN ISOLATION YELLOW UNIV NOT STERILE 3110PG

## (undated) DEVICE — STPL LINEAR CUT 55MM TLC55

## (undated) DEVICE — Device

## (undated) DEVICE — KIT NEW IMAGE COLOSTOMY/ILEOSTOMY 2 3/4" LF 19354

## (undated) DEVICE — DEVICE SUTURE GRASPER TROCAR CLOSURE 14GA PMITCSG

## (undated) DEVICE — KIT ENDO FIRST STEP DISINFECTANT 200ML W/POUCH EP-4

## (undated) DEVICE — BNDG ABDOMINAL BINDER 9X45-62" 79-89071

## (undated) DEVICE — BLADE CLIPPER SGL USE 9680

## (undated) DEVICE — KIT CONNECTOR FOR OLYMPUS ENDOSCOPES DEFENDO 100310

## (undated) DEVICE — SU PDS II 0 TP-1 60" Z991G

## (undated) DEVICE — SU SILK 3-0 TIE 12X30" A304H

## (undated) DEVICE — SU SILK 3-0 SH 30" K832H

## (undated) DEVICE — ANTIFOG SOLUTION W/FOAM PAD 31142527

## (undated) DEVICE — LINEN TOWEL PACK X6 WHITE 5487

## (undated) DEVICE — CATH TRAY FOLEY SURESTEP 16FR W/URNE MTR STLK LATEX A303316A

## (undated) DEVICE — STPL SKIN 35W 059037

## (undated) DEVICE — DRAPE MAYO STAND 23X54 8337

## (undated) DEVICE — STPL SKIN 35W ROTATING HEAD PRW35

## (undated) DEVICE — TUBING SUCTION 10'X3/16" N510

## (undated) DEVICE — SU SILK 2-0 TIE 12X30" A305H

## (undated) DEVICE — LINEN TOWEL PACK X5 5464

## (undated) DEVICE — STPL CIRCULAR 29MM CVD CDH29P

## (undated) DEVICE — PREP CHLORAPREP 26ML TINTED HI-LITE ORANGE 930815

## (undated) DEVICE — NDL 25GA 1.5" 305127

## (undated) DEVICE — PACK ENDOSCOPY GI CUSTOM UMMC

## (undated) DEVICE — SNARE CAPIVATOR ROUND COLD SNR BX10 M00561101

## (undated) DEVICE — LINEN TOWEL PACK X30 5481

## (undated) DEVICE — NDL COUNTER 40CT  31142311

## (undated) DEVICE — ENDO TROCAR FIRST ENTRY KII FIOS ADV FIX 12X100MM CFF73

## (undated) DEVICE — DECANTER VIAL 2006S

## (undated) DEVICE — ENDO TUBING CO2 SMARTCAP STERILE DISP 100145CO2EXT

## (undated) DEVICE — STPL RELOAD LINEAR CUT 55MM TCR55

## (undated) DEVICE — SUCTION MANIFOLD NEPTUNE 2 SYS 4 PORT 0702-020-000

## (undated) DEVICE — DRAPE IOBAN INCISE 23X17" 6650EZ

## (undated) DEVICE — DRSG GAUZE 4X4" TRAY 6939

## (undated) DEVICE — PAD CHUX UNDERPAD 23X24" 7136

## (undated) DEVICE — ENDO BITE BLOCK ADULT OMNI-BLOC

## (undated) DEVICE — ENDO FORCEP ENDOJAW BIOPSY 2.8MMX230CM FB-220U

## (undated) DEVICE — INTR PEELAWAY 22FRX13CM G04096 PLVW-22.0-38

## (undated) DEVICE — DRAPE LEGGINGS CLEAR 8430

## (undated) DEVICE — 10 FOOT TUBING WITH WAND

## (undated) DEVICE — SU VICRYL 0 TIE 12X18" J906G

## (undated) DEVICE — SU ETHILON 2-0 FS 18" 664H

## (undated) DEVICE — DRAIN SYSTEM ENTERAL W/ENFIT CONNECTORS 250ML EDS-250

## (undated) DEVICE — SU DERMABOND ADVANCED .7ML DNX12

## (undated) DEVICE — GLOVE ESTEEM POWDER FREE SMT 6.5  2D72PT65

## (undated) DEVICE — GLOVE PROTEXIS POWDER FREE 7.5 ORTHOPEDIC 2D73ET75

## (undated) DEVICE — DRSG GAUZE 4X4" 2187

## (undated) DEVICE — SOL NACL 0.9% IRRIG 1000ML BOTTLE 2F7124

## (undated) DEVICE — NDL COUNTER 20CT 31142493

## (undated) DEVICE — SPONGE LAP 18X18" X8435

## (undated) DEVICE — CLIP HORIZON SM RED WIDE SLOT 001201

## (undated) DEVICE — SU SILK 3-0 SH CR 8X18" C013D

## (undated) DEVICE — SYR 10ML LL W/O NDL

## (undated) DEVICE — GUIDEWIRE TERUMO .035X260 3CM STR TIP GS3504

## (undated) DEVICE — LABEL MEDICATION SYSTEM 3303-P

## (undated) DEVICE — TUBING INSUFFLATION W/FILTER CPC TO LUER 620-030-301

## (undated) DEVICE — SUCTION MANIFOLD NEPTUNE 2 SYS 1 PORT 702-025-000

## (undated) DEVICE — GLOVE BIOGEL PI ULTRATOUCH SZ 8.0 41180

## (undated) DEVICE — SU PDS II 0 CTX 36" Z370T

## (undated) DEVICE — SOL WATER IRRIG 1000ML BOTTLE 2F7114

## (undated) DEVICE — BLADE KNIFE SURG 15 371115

## (undated) DEVICE — PREP POVIDONE IODINE SOLUTION 10% 4OZ

## (undated) DEVICE — TUBE GASTROSTOMY MIC LOW VOL ENFIT 16FR SIL 8100-16LV

## (undated) DEVICE — NDL INSUFFLATION 13GA 150MM C2202

## (undated) DEVICE — DRSG GAUZE 4X8"

## (undated) DEVICE — SYR 30ML SLIP TIP W/O NDL 302833

## (undated) DEVICE — STPL ECHELON CONTOUR CUTTER CVD 40MM GREEN GCS40G

## (undated) DEVICE — SYR 50ML CATH TIP W/O NDL 309620

## (undated) DEVICE — DRSG DRAIN 2X2" 7087

## (undated) DEVICE — PACK MINOR CUSTOM ASC

## (undated) DEVICE — TUBING EXTENSION SET MICROBORE 6" LL 2N1194

## (undated) DEVICE — ESU GROUND PAD ADULT W/CORD E7507

## (undated) DEVICE — PREP CHLORAPREP 26ML TINTED ORANGE  260815

## (undated) DEVICE — ENDO TROCAR SLEEVE KII Z-THREADED 05X100MM CTS02

## (undated) DEVICE — PACK AB HYST II

## (undated) DEVICE — GLOVE PROTEXIS POWDER FREE SMT 6.5  2D72PT65X

## (undated) DEVICE — STPL RELOAD LINEAR CUT 75MM TCR75

## (undated) DEVICE — SCISSORS ENDOSCOPIC ENSIZOR 165CM 2.6MM ES26165

## (undated) DEVICE — SU SILK 0 TIE 6X30" A306H

## (undated) DEVICE — DRSG GAUZE 2X2" 8042

## (undated) DEVICE — DRAPE GYN/UROLOGY FLUID POUCH TUR 29455

## (undated) DEVICE — GLOVE PROTEXIS POWDER FREE SMT 8.0  2D72PT80X

## (undated) DEVICE — DEVICE RETRIEVAL ROTH NET FB PED 00711057

## (undated) DEVICE — SU VICRYL 3-0 SH 27" UND J416H

## (undated) DEVICE — DEVICE SUTURE PASSER 14GA WECK EFX EFXSP2

## (undated) DEVICE — SYR ENTERAL W/ENFIT CONNECTOR PURPLE 60ML 460SE

## (undated) DEVICE — SU MONOCRYL 4-0 PS-2 27" UND Y426H

## (undated) DEVICE — COVER ULTRASOUND PROBE W/GEL FLEXI-FEEL 6"X58" LF  25-FF658

## (undated) DEVICE — WIRE GUIDE 0.025"X270CM STR VISIGLIDE G-240-2527S

## (undated) DEVICE — DILATOR RENAL SHEATH SET AMPLATZ 8-30FRX35CM M0062602500

## (undated) DEVICE — NDL 22GA 1.5"

## (undated) DEVICE — ESU HARMONIC ACE LAPAROSCOPIC SHEARS 5MMX36CM CVD HAR36

## (undated) DEVICE — TUBE JEJUNOSTOMY ENFIT 16FR 8200-16

## (undated) DEVICE — ENDO FCP GRASPING ROTATABLE 7.2MMX2.6MM FG-244NR

## (undated) DEVICE — SPECIMEN CONTAINER 3OZ W/FORMALIN 59901

## (undated) DEVICE — TUBE GASTRO-ENTERIC FEEDING ENFIT 18FR 8210-18

## (undated) DEVICE — PACK GOWN 3/PK DISP XL SBA32GPFCB

## (undated) DEVICE — SOL NACL 0.9% INJ 1000ML BAG 07983-09

## (undated) DEVICE — TRAY PAIN INJECTION 97A 640

## (undated) DEVICE — SPECIMEN CONTAINER W/10% BUFFERED FORMALIN 120ML 591201

## (undated) DEVICE — WIPE PREMOIST CLEANSING WASHCLOTHS 7988

## (undated) DEVICE — DRAPE SHEET REV FOLD 3/4 9349

## (undated) DEVICE — PREP CHLORAPREP W/ORANGE TINT 10.5ML 260715

## (undated) DEVICE — TUBE GASTROSTOMY PEG SET 24FR G22636 PEG-24-PULL-S

## (undated) DEVICE — SU VICRYL 0 CT-1 27" UND J260H

## (undated) DEVICE — ENDO SNARE POLYPECTOMY OVAL 15MM LOOP SD-240U-15

## (undated) DEVICE — GOWN IMPERVIOUS 2XL BLUE

## (undated) DEVICE — BOWL STERILE 32OZ DYND50320

## (undated) DEVICE — SUCTION TIP YANKAUER W/O VENT K86

## (undated) DEVICE — PREP POVIDONE-IODINE 7.5% SCRUB 4OZ BOTTLE MDS093945

## (undated) DEVICE — SU VICRYL 0 UR-6 27" J603H

## (undated) DEVICE — ESU PENCIL W/COATED BLADE E2450H

## (undated) DEVICE — SU VICRYL 2-0 SH 27" UND J417H

## (undated) DEVICE — GUIDEWIRE TERUMO .018 X 180CM STR GR1803

## (undated) DEVICE — SU VICRYL 3-0 SH 8X18" UND J864D

## (undated) DEVICE — DRAPE SHEET MED 44X70" 9355

## (undated) DEVICE — CLIP HORIZON LG ORANGE 004200

## (undated) DEVICE — SU PDS II 1 TP-1 48" Z880G

## (undated) DEVICE — ESU LIGASURE IMPACT OPEN SEALER/DVDR CVD LG JAW LF4418

## (undated) DEVICE — DRAPE C-ARM W/STRAPS 42X72" 07-CA104

## (undated) DEVICE — GLOVE BIOGEL PI SZ 8.0 40880

## (undated) DEVICE — SOL WATER IRRIG 500ML BOTTLE 2F7113

## (undated) DEVICE — TUBING SMOKE EVAC PNEUMOCLEAR HIGH FLOW 0620050250

## (undated) DEVICE — NDL SPINAL 22GA 3.5" QUINCKE 405181

## (undated) DEVICE — ENDO TROCAR FIRST ENTRY KII FIOS Z-THRD 05X100MM CTF03

## (undated) DEVICE — DRSG STERI STRIP 1/2X4" R1547

## (undated) DEVICE — ENDO CLOSING KIT ENDOCLOSE 173022

## (undated) DEVICE — INTRODUCER KIT GASTROSTOMY MIC G 22FR 98433

## (undated) DEVICE — SU ETHILON 3-0 PS-1 18" 1663H

## (undated) DEVICE — GLIDEWIRE TERUMO .035X180CM STR GR503

## (undated) DEVICE — TUBE TRANS GASTROJEJUNOSTOMY ENFIT 18FRX45CM 8250-18

## (undated) DEVICE — SYR 10ML SLIP TIP W/O NDL 303134

## (undated) DEVICE — SU VICRYL 0 TIE 54" J608H

## (undated) DEVICE — SU VICRYL 3-0 SH 27" J316H

## (undated) DEVICE — GUIDEWIRE TERUMO .035X260CM EXCHANGE ANG GS3509

## (undated) DEVICE — SU PROLENE 2-0 SHDA 36" 8523H

## (undated) DEVICE — BARRIER SEPRAFILM 3X5" X6 SHEETS 5086-02

## (undated) DEVICE — COVER CAMERA IN-LIGHT DISP LT-C02

## (undated) DEVICE — BITE BLOCK ENDO W/DENTAL RIM 54FR SBT-114-100

## (undated) DEVICE — GLIDEWIRE

## (undated) DEVICE — SUCTION IRR STRYKERFLOW II W/TIP 250-070-520

## (undated) DEVICE — DRAPE LAVH/LAPAROSCOPY W/POUCH 29474

## (undated) DEVICE — LINEN GOWN XLG 5407

## (undated) DEVICE — DRAPE U SPLIT 74X120" 29440

## (undated) DEVICE — DRSG GAUZE 3X3"

## (undated) DEVICE — ENDO TROCAR OPTICAL ACCESS KII LOW PROFILE 05X55MM CTR21

## (undated) DEVICE — SU SILK 4-0 TIE 12X30" A303H

## (undated) DEVICE — DEVICE ANCHORING FLEXI-TRAK 37449

## (undated) DEVICE — SOL NACL 0.9% 10ML VIAL 0409-4888-02

## (undated) DEVICE — ENDO FORCEP BX CAPTURA PRO SPIKE G50696

## (undated) DEVICE — INFLATION DEVICE BIG 60 ENDO-AN6012

## (undated) DEVICE — BALLOON ELATION 180CML 17-18-19-20-21MM 5.5CM EPX18

## (undated) DEVICE — DRSG DRAIN 4X4" 7086

## (undated) DEVICE — PREP CHLORAPREP CLEAR 3ML 260400

## (undated) DEVICE — SU CINCH OVERSTITCH SINGLE USE APOLLO CNH-G01-000

## (undated) DEVICE — DEVICE FIXATION ABSORBLE TACK 5MM SINGLE ABSTACK30X

## (undated) DEVICE — SU VICRYL 3-0 FS-1 27" J442H

## (undated) DEVICE — NDL ECLIPSE 25GA 1.5"

## (undated) DEVICE — ENDO BRUSH CYTOLOGY GASTROSCOPE 2.0MMX180CM G22409

## (undated) DEVICE — NDL BLUNT 18GA 1.5" FILTER 305211

## (undated) DEVICE — CONNECTOR FUNNEL TRANSITION ENFIT F00106

## (undated) DEVICE — NDL 18GAX1.5" 305185

## (undated) DEVICE — STPL LINEAR CUT 75MM TLC75

## (undated) DEVICE — TUBE GASTROSTOMY MIC ENFIT 18FR 8100-18

## (undated) DEVICE — KIT ENDO TURNOVER/PROCEDURE CARRY-ON 101822

## (undated) DEVICE — ENDO SNARE POLYPECTOMY OVAL 25MM LOOP SD-240U-25

## (undated) RX ORDER — APREPITANT 40 MG/1
CAPSULE ORAL
Status: DISPENSED
Start: 2024-10-11

## (undated) RX ORDER — LIDOCAINE HYDROCHLORIDE 20 MG/ML
INJECTION, SOLUTION EPIDURAL; INFILTRATION; INTRACAUDAL; PERINEURAL
Status: DISPENSED
Start: 2021-09-07

## (undated) RX ORDER — ONDANSETRON 2 MG/ML
INJECTION INTRAMUSCULAR; INTRAVENOUS
Status: DISPENSED
Start: 2024-09-11

## (undated) RX ORDER — HYDROMORPHONE HCL IN WATER/PF 6 MG/30 ML
PATIENT CONTROLLED ANALGESIA SYRINGE INTRAVENOUS
Status: DISPENSED
Start: 2021-12-29

## (undated) RX ORDER — SODIUM CHLORIDE, SODIUM LACTATE, POTASSIUM CHLORIDE, CALCIUM CHLORIDE 600; 310; 30; 20 MG/100ML; MG/100ML; MG/100ML; MG/100ML
INJECTION, SOLUTION INTRAVENOUS
Status: DISPENSED
Start: 2024-10-11

## (undated) RX ORDER — ERTAPENEM 1 G/1
INJECTION, POWDER, LYOPHILIZED, FOR SOLUTION INTRAMUSCULAR; INTRAVENOUS
Status: DISPENSED
Start: 2022-12-30

## (undated) RX ORDER — HYDROMORPHONE HCL IN WATER/PF 6 MG/30 ML
PATIENT CONTROLLED ANALGESIA SYRINGE INTRAVENOUS
Status: DISPENSED
Start: 2021-09-07

## (undated) RX ORDER — HYDROMORPHONE HCL IN WATER/PF 6 MG/30 ML
PATIENT CONTROLLED ANALGESIA SYRINGE INTRAVENOUS
Status: DISPENSED
Start: 2022-12-09

## (undated) RX ORDER — HYDROMORPHONE HYDROCHLORIDE 1 MG/ML
INJECTION, SOLUTION INTRAMUSCULAR; INTRAVENOUS; SUBCUTANEOUS
Status: DISPENSED
Start: 2024-10-11

## (undated) RX ORDER — HYDROMORPHONE HYDROCHLORIDE 1 MG/ML
INJECTION, SOLUTION INTRAMUSCULAR; INTRAVENOUS; SUBCUTANEOUS
Status: DISPENSED
Start: 2023-05-04

## (undated) RX ORDER — FENTANYL CITRATE 50 UG/ML
INJECTION, SOLUTION INTRAMUSCULAR; INTRAVENOUS
Status: DISPENSED
Start: 2018-08-23

## (undated) RX ORDER — DIPHENHYDRAMINE HYDROCHLORIDE 50 MG/ML
INJECTION INTRAMUSCULAR; INTRAVENOUS
Status: DISPENSED
Start: 2022-12-10

## (undated) RX ORDER — FENTANYL CITRATE 50 UG/ML
INJECTION, SOLUTION INTRAMUSCULAR; INTRAVENOUS
Status: DISPENSED
Start: 2024-10-11

## (undated) RX ORDER — DEXAMETHASONE SODIUM PHOSPHATE 4 MG/ML
INJECTION, SOLUTION INTRA-ARTICULAR; INTRALESIONAL; INTRAMUSCULAR; INTRAVENOUS; SOFT TISSUE
Status: DISPENSED
Start: 2021-11-28

## (undated) RX ORDER — IOPAMIDOL 408 MG/ML
INJECTION, SOLUTION INTRATHECAL
Status: DISPENSED
Start: 2023-08-01

## (undated) RX ORDER — FENTANYL CITRATE 50 UG/ML
INJECTION, SOLUTION INTRAMUSCULAR; INTRAVENOUS
Status: DISPENSED
Start: 2024-10-31

## (undated) RX ORDER — BUPIVACAINE HYDROCHLORIDE 5 MG/ML
INJECTION, SOLUTION EPIDURAL; INTRACAUDAL
Status: DISPENSED
Start: 2023-01-31

## (undated) RX ORDER — ONDANSETRON 2 MG/ML
INJECTION INTRAMUSCULAR; INTRAVENOUS
Status: DISPENSED
Start: 2024-10-31

## (undated) RX ORDER — FENTANYL CITRATE 50 UG/ML
INJECTION, SOLUTION INTRAMUSCULAR; INTRAVENOUS
Status: DISPENSED
Start: 2021-10-19

## (undated) RX ORDER — DEXTROSE MONOHYDRATE 25 G/50ML
INJECTION, SOLUTION INTRAVENOUS
Status: DISPENSED
Start: 2024-04-16

## (undated) RX ORDER — LIDOCAINE HYDROCHLORIDE 10 MG/ML
INJECTION, SOLUTION EPIDURAL; INFILTRATION; INTRACAUDAL; PERINEURAL
Status: DISPENSED
Start: 2024-10-22

## (undated) RX ORDER — PROPOFOL 10 MG/ML
INJECTION, EMULSION INTRAVENOUS
Status: DISPENSED
Start: 2024-10-31

## (undated) RX ORDER — DIPHENHYDRAMINE HYDROCHLORIDE 50 MG/ML
INJECTION INTRAMUSCULAR; INTRAVENOUS
Status: DISPENSED
Start: 2022-12-30

## (undated) RX ORDER — IOPAMIDOL 408 MG/ML
INJECTION, SOLUTION INTRATHECAL
Status: DISPENSED
Start: 2023-11-11

## (undated) RX ORDER — SIMETHICONE 40MG/0.6ML
SUSPENSION, DROPS(FINAL DOSAGE FORM)(ML) ORAL
Status: DISPENSED
Start: 2023-06-09

## (undated) RX ORDER — FENTANYL CITRATE 50 UG/ML
INJECTION, SOLUTION INTRAMUSCULAR; INTRAVENOUS
Status: DISPENSED
Start: 2021-11-28

## (undated) RX ORDER — LIDOCAINE HYDROCHLORIDE 10 MG/ML
INJECTION, SOLUTION INFILTRATION; PERINEURAL
Status: DISPENSED
Start: 2023-01-26

## (undated) RX ORDER — INDOMETHACIN 50 MG/1
SUPPOSITORY RECTAL
Status: DISPENSED
Start: 2021-09-07

## (undated) RX ORDER — SODIUM CHLORIDE 9 MG/ML
INJECTION, SOLUTION INTRAVENOUS
Status: DISPENSED
Start: 2024-04-15

## (undated) RX ORDER — FENTANYL CITRATE 50 UG/ML
INJECTION, SOLUTION INTRAMUSCULAR; INTRAVENOUS
Status: DISPENSED
Start: 2023-08-01

## (undated) RX ORDER — OXYCODONE HYDROCHLORIDE 5 MG/1
TABLET ORAL
Status: DISPENSED
Start: 2024-09-11

## (undated) RX ORDER — CIPROFLOXACIN 2 MG/ML
INJECTION, SOLUTION INTRAVENOUS
Status: DISPENSED
Start: 2021-11-28

## (undated) RX ORDER — EPINEPHRINE 1 MG/ML
INJECTION, SOLUTION INTRAMUSCULAR; SUBCUTANEOUS
Status: DISPENSED
Start: 2024-10-31

## (undated) RX ORDER — EPHEDRINE SULFATE 50 MG/ML
INJECTION, SOLUTION INTRAMUSCULAR; INTRAVENOUS; SUBCUTANEOUS
Status: DISPENSED
Start: 2021-12-29

## (undated) RX ORDER — DIPHENHYDRAMINE HYDROCHLORIDE 50 MG/ML
INJECTION INTRAMUSCULAR; INTRAVENOUS
Status: DISPENSED
Start: 2023-11-11

## (undated) RX ORDER — DEXAMETHASONE SODIUM PHOSPHATE 4 MG/ML
INJECTION, SOLUTION INTRA-ARTICULAR; INTRALESIONAL; INTRAMUSCULAR; INTRAVENOUS; SOFT TISSUE
Status: DISPENSED
Start: 2024-09-11

## (undated) RX ORDER — HYDROMORPHONE HCL IN WATER/PF 6 MG/30 ML
PATIENT CONTROLLED ANALGESIA SYRINGE INTRAVENOUS
Status: DISPENSED
Start: 2022-11-22

## (undated) RX ORDER — IOPAMIDOL 510 MG/ML
INJECTION, SOLUTION INTRAVASCULAR
Status: DISPENSED
Start: 2021-09-07

## (undated) RX ORDER — ONDANSETRON 2 MG/ML
INJECTION INTRAMUSCULAR; INTRAVENOUS
Status: DISPENSED
Start: 2021-09-07

## (undated) RX ORDER — FENTANYL CITRATE 50 UG/ML
INJECTION, SOLUTION INTRAMUSCULAR; INTRAVENOUS
Status: DISPENSED
Start: 2021-09-07

## (undated) RX ORDER — LIDOCAINE HYDROCHLORIDE 20 MG/ML
INJECTION, SOLUTION EPIDURAL; INFILTRATION; INTRACAUDAL; PERINEURAL
Status: DISPENSED
Start: 2021-09-22

## (undated) RX ORDER — FENTANYL CITRATE 50 UG/ML
INJECTION, SOLUTION INTRAMUSCULAR; INTRAVENOUS
Status: DISPENSED
Start: 2022-12-30

## (undated) RX ORDER — PROPOFOL 10 MG/ML
INJECTION, EMULSION INTRAVENOUS
Status: DISPENSED
Start: 2021-12-29

## (undated) RX ORDER — FENTANYL CITRATE 50 UG/ML
INJECTION, SOLUTION INTRAMUSCULAR; INTRAVENOUS
Status: DISPENSED
Start: 2021-09-10

## (undated) RX ORDER — FENTANYL CITRATE-0.9 % NACL/PF 10 MCG/ML
PLASTIC BAG, INJECTION (ML) INTRAVENOUS
Status: DISPENSED
Start: 2023-06-09

## (undated) RX ORDER — HEPARIN SODIUM (PORCINE) LOCK FLUSH IV SOLN 100 UNIT/ML 100 UNIT/ML
SOLUTION INTRAVENOUS
Status: DISPENSED
Start: 2024-04-15

## (undated) RX ORDER — PROPOFOL 10 MG/ML
INJECTION, EMULSION INTRAVENOUS
Status: DISPENSED
Start: 2023-06-09

## (undated) RX ORDER — CLINDAMYCIN PHOSPHATE 900 MG/50ML
INJECTION, SOLUTION INTRAVENOUS
Status: DISPENSED
Start: 2018-08-23

## (undated) RX ORDER — PROPOFOL 10 MG/ML
INJECTION, EMULSION INTRAVENOUS
Status: DISPENSED
Start: 2022-12-30

## (undated) RX ORDER — HYDROMORPHONE HYDROCHLORIDE 1 MG/ML
INJECTION, SOLUTION INTRAMUSCULAR; INTRAVENOUS; SUBCUTANEOUS
Status: DISPENSED
Start: 2018-08-23

## (undated) RX ORDER — CEFAZOLIN SODIUM 1 G/3ML
INJECTION, POWDER, FOR SOLUTION INTRAMUSCULAR; INTRAVENOUS
Status: DISPENSED
Start: 2021-11-28

## (undated) RX ORDER — DIPHENHYDRAMINE HYDROCHLORIDE 50 MG/ML
INJECTION INTRAMUSCULAR; INTRAVENOUS
Status: DISPENSED
Start: 2021-09-10

## (undated) RX ORDER — GLYCOPYRROLATE 0.2 MG/ML
INJECTION, SOLUTION INTRAMUSCULAR; INTRAVENOUS
Status: DISPENSED
Start: 2018-08-23

## (undated) RX ORDER — FENTANYL CITRATE-0.9 % NACL/PF 10 MCG/ML
PLASTIC BAG, INJECTION (ML) INTRAVENOUS
Status: DISPENSED
Start: 2024-09-11

## (undated) RX ORDER — SODIUM CHLORIDE, SODIUM LACTATE, POTASSIUM CHLORIDE, CALCIUM CHLORIDE 600; 310; 30; 20 MG/100ML; MG/100ML; MG/100ML; MG/100ML
INJECTION, SOLUTION INTRAVENOUS
Status: DISPENSED
Start: 2021-09-07

## (undated) RX ORDER — LIDOCAINE HYDROCHLORIDE 20 MG/ML
INJECTION, SOLUTION EPIDURAL; INFILTRATION; INTRACAUDAL; PERINEURAL
Status: DISPENSED
Start: 2021-11-28

## (undated) RX ORDER — FENTANYL CITRATE 50 UG/ML
INJECTION, SOLUTION INTRAMUSCULAR; INTRAVENOUS
Status: DISPENSED
Start: 2024-09-11

## (undated) RX ORDER — PROPOFOL 10 MG/ML
INJECTION, EMULSION INTRAVENOUS
Status: DISPENSED
Start: 2018-08-23

## (undated) RX ORDER — FENTANYL CITRATE 50 UG/ML
INJECTION, SOLUTION INTRAMUSCULAR; INTRAVENOUS
Status: DISPENSED
Start: 2021-12-29

## (undated) RX ORDER — HYDROMORPHONE HCL IN WATER/PF 6 MG/30 ML
PATIENT CONTROLLED ANALGESIA SYRINGE INTRAVENOUS
Status: DISPENSED
Start: 2024-10-11

## (undated) RX ORDER — FENTANYL CITRATE 50 UG/ML
INJECTION, SOLUTION INTRAMUSCULAR; INTRAVENOUS
Status: DISPENSED
Start: 2022-11-22

## (undated) RX ORDER — FENTANYL CITRATE 50 UG/ML
INJECTION, SOLUTION INTRAMUSCULAR; INTRAVENOUS
Status: DISPENSED
Start: 2023-01-31

## (undated) RX ORDER — HYDROMORPHONE HCL IN WATER/PF 6 MG/30 ML
PATIENT CONTROLLED ANALGESIA SYRINGE INTRAVENOUS
Status: DISPENSED
Start: 2023-01-31

## (undated) RX ORDER — FENTANYL CITRATE 50 UG/ML
INJECTION, SOLUTION INTRAMUSCULAR; INTRAVENOUS
Status: DISPENSED
Start: 2023-11-14

## (undated) RX ORDER — OXYCODONE HYDROCHLORIDE 5 MG/1
TABLET ORAL
Status: DISPENSED
Start: 2023-01-12

## (undated) RX ORDER — BUPIVACAINE HYDROCHLORIDE AND EPINEPHRINE 2.5; 5 MG/ML; UG/ML
INJECTION, SOLUTION EPIDURAL; INFILTRATION; INTRACAUDAL; PERINEURAL
Status: DISPENSED
Start: 2017-04-17

## (undated) RX ORDER — EPHEDRINE SULFATE 50 MG/ML
INJECTION, SOLUTION INTRAMUSCULAR; INTRAVENOUS; SUBCUTANEOUS
Status: DISPENSED
Start: 2021-11-28

## (undated) RX ORDER — CEFAZOLIN SODIUM 2 G/100ML
INJECTION, SOLUTION INTRAVENOUS
Status: DISPENSED
Start: 2024-04-15

## (undated) RX ORDER — FENTANYL CITRATE-0.9 % NACL/PF 10 MCG/ML
PLASTIC BAG, INJECTION (ML) INTRAVENOUS
Status: DISPENSED
Start: 2023-01-31

## (undated) RX ORDER — FENTANYL CITRATE 50 UG/ML
INJECTION, SOLUTION INTRAMUSCULAR; INTRAVENOUS
Status: DISPENSED
Start: 2023-12-08

## (undated) RX ORDER — ACETAMINOPHEN 325 MG/1
TABLET ORAL
Status: DISPENSED
Start: 2022-12-30

## (undated) RX ORDER — ACETAMINOPHEN 325 MG/1
TABLET ORAL
Status: DISPENSED
Start: 2024-09-11

## (undated) RX ORDER — ONDANSETRON 2 MG/ML
INJECTION INTRAMUSCULAR; INTRAVENOUS
Status: DISPENSED
Start: 2021-12-29

## (undated) RX ORDER — CLINDAMYCIN PHOSPHATE 900 MG/50ML
INJECTION, SOLUTION INTRAVENOUS
Status: DISPENSED
Start: 2022-06-10

## (undated) RX ORDER — HYDROMORPHONE HCL IN WATER/PF 6 MG/30 ML
PATIENT CONTROLLED ANALGESIA SYRINGE INTRAVENOUS
Status: DISPENSED
Start: 2024-10-31

## (undated) RX ORDER — NICOTINE POLACRILEX 4 MG
LOZENGE BUCCAL
Status: DISPENSED
Start: 2024-09-11

## (undated) RX ORDER — LIDOCAINE HYDROCHLORIDE 10 MG/ML
INJECTION, SOLUTION EPIDURAL; INFILTRATION; INTRACAUDAL; PERINEURAL
Status: DISPENSED
Start: 2025-01-29

## (undated) RX ORDER — GLYCOPYRROLATE 0.2 MG/ML
INJECTION, SOLUTION INTRAMUSCULAR; INTRAVENOUS
Status: DISPENSED
Start: 2021-11-28

## (undated) RX ORDER — LIDOCAINE HYDROCHLORIDE 20 MG/ML
SOLUTION OROPHARYNGEAL
Status: DISPENSED
Start: 2023-11-14

## (undated) RX ORDER — DIMENHYDRINATE 50 MG/ML
INJECTION, SOLUTION INTRAMUSCULAR; INTRAVENOUS
Status: DISPENSED
Start: 2024-10-11

## (undated) RX ORDER — FENTANYL CITRATE 50 UG/ML
INJECTION, SOLUTION INTRAMUSCULAR; INTRAVENOUS
Status: DISPENSED
Start: 2023-11-09

## (undated) RX ORDER — LIDOCAINE HYDROCHLORIDE AND EPINEPHRINE BITARTRATE 20; .01 MG/ML; MG/ML
INJECTION, SOLUTION SUBCUTANEOUS
Status: DISPENSED
Start: 2017-05-31

## (undated) RX ORDER — FENTANYL CITRATE 50 UG/ML
INJECTION, SOLUTION INTRAMUSCULAR; INTRAVENOUS
Status: DISPENSED
Start: 2021-09-22

## (undated) RX ORDER — DIPHENHYDRAMINE HYDROCHLORIDE 50 MG/ML
INJECTION INTRAMUSCULAR; INTRAVENOUS
Status: DISPENSED
Start: 2021-09-22

## (undated) RX ORDER — SODIUM CHLORIDE, SODIUM LACTATE, POTASSIUM CHLORIDE, CALCIUM CHLORIDE 600; 310; 30; 20 MG/100ML; MG/100ML; MG/100ML; MG/100ML
INJECTION, SOLUTION INTRAVENOUS
Status: DISPENSED
Start: 2018-08-23

## (undated) RX ORDER — LIDOCAINE HYDROCHLORIDE 10 MG/ML
INJECTION, SOLUTION EPIDURAL; INFILTRATION; INTRACAUDAL; PERINEURAL
Status: DISPENSED
Start: 2024-04-15

## (undated) RX ORDER — FENTANYL CITRATE 50 UG/ML
INJECTION, SOLUTION INTRAMUSCULAR; INTRAVENOUS
Status: DISPENSED
Start: 2022-12-09

## (undated) RX ORDER — DIPHENHYDRAMINE HYDROCHLORIDE 50 MG/ML
INJECTION INTRAMUSCULAR; INTRAVENOUS
Status: DISPENSED
Start: 2021-12-29

## (undated) RX ORDER — PROPOFOL 10 MG/ML
INJECTION, EMULSION INTRAVENOUS
Status: DISPENSED
Start: 2022-12-09

## (undated) RX ORDER — OXYMETAZOLINE HYDROCHLORIDE 0.05 G/100ML
SPRAY NASAL
Status: DISPENSED
Start: 2021-11-28

## (undated) RX ORDER — FENTANYL CITRATE-0.9 % NACL/PF 10 MCG/ML
PLASTIC BAG, INJECTION (ML) INTRAVENOUS
Status: DISPENSED
Start: 2021-09-22

## (undated) RX ORDER — HEPARIN SODIUM (PORCINE) LOCK FLUSH IV SOLN 100 UNIT/ML 100 UNIT/ML
SOLUTION INTRAVENOUS
Status: DISPENSED
Start: 2024-09-04

## (undated) RX ORDER — SCOPOLAMINE 1 MG/3D
PATCH, EXTENDED RELEASE TRANSDERMAL
Status: DISPENSED
Start: 2025-07-22

## (undated) RX ORDER — HYDROMORPHONE HCL IN WATER/PF 6 MG/30 ML
PATIENT CONTROLLED ANALGESIA SYRINGE INTRAVENOUS
Status: DISPENSED
Start: 2022-12-30

## (undated) RX ORDER — PROPOFOL 10 MG/ML
INJECTION, EMULSION INTRAVENOUS
Status: DISPENSED
Start: 2023-05-04

## (undated) RX ORDER — PROPOFOL 10 MG/ML
INJECTION, EMULSION INTRAVENOUS
Status: DISPENSED
Start: 2021-09-22

## (undated) RX ORDER — BUPIVACAINE HYDROCHLORIDE 2.5 MG/ML
INJECTION, SOLUTION EPIDURAL; INFILTRATION; INTRACAUDAL
Status: DISPENSED
Start: 2022-12-30

## (undated) RX ORDER — ROCURONIUM BROMIDE 50 MG/5 ML
SYRINGE (ML) INTRAVENOUS
Status: DISPENSED
Start: 2021-11-28

## (undated) RX ORDER — INDOMETHACIN 50 MG/1
SUPPOSITORY RECTAL
Status: DISPENSED
Start: 2024-10-31

## (undated) RX ORDER — CEFAZOLIN SODIUM/WATER 2 G/20 ML
SYRINGE (ML) INTRAVENOUS
Status: DISPENSED
Start: 2024-09-11

## (undated) RX ORDER — EPHEDRINE SULFATE 50 MG/ML
INJECTION, SOLUTION INTRAMUSCULAR; INTRAVENOUS; SUBCUTANEOUS
Status: DISPENSED
Start: 2022-12-30

## (undated) RX ORDER — HALOPERIDOL 5 MG/ML
INJECTION INTRAMUSCULAR
Status: DISPENSED
Start: 2024-10-11

## (undated) RX ORDER — HEPARIN SODIUM,PORCINE 10 UNIT/ML
VIAL (ML) INTRAVENOUS
Status: DISPENSED
Start: 2024-11-25

## (undated) RX ORDER — LIDOCAINE HYDROCHLORIDE 10 MG/ML
INJECTION, SOLUTION EPIDURAL; INFILTRATION; INTRACAUDAL; PERINEURAL
Status: DISPENSED
Start: 2021-12-05

## (undated) RX ORDER — SODIUM CHLORIDE, SODIUM LACTATE, POTASSIUM CHLORIDE, CALCIUM CHLORIDE 600; 310; 30; 20 MG/100ML; MG/100ML; MG/100ML; MG/100ML
INJECTION, SOLUTION INTRAVENOUS
Status: DISPENSED
Start: 2021-11-28

## (undated) RX ORDER — LIDOCAINE HYDROCHLORIDE 10 MG/ML
INJECTION, SOLUTION EPIDURAL; INFILTRATION; INTRACAUDAL; PERINEURAL
Status: DISPENSED
Start: 2023-11-09

## (undated) RX ORDER — EPHEDRINE SULFATE 50 MG/ML
INJECTION, SOLUTION INTRAMUSCULAR; INTRAVENOUS; SUBCUTANEOUS
Status: DISPENSED
Start: 2024-10-11

## (undated) RX ORDER — FENTANYL CITRATE 50 UG/ML
INJECTION, SOLUTION INTRAMUSCULAR; INTRAVENOUS
Status: DISPENSED
Start: 2024-04-15

## (undated) RX ORDER — SCOLOPAMINE TRANSDERMAL SYSTEM 1 MG/1
PATCH, EXTENDED RELEASE TRANSDERMAL
Status: DISPENSED
Start: 2021-11-28

## (undated) RX ORDER — DEXAMETHASONE SODIUM PHOSPHATE 4 MG/ML
INJECTION, SOLUTION INTRA-ARTICULAR; INTRALESIONAL; INTRAMUSCULAR; INTRAVENOUS; SOFT TISSUE
Status: DISPENSED
Start: 2024-10-31

## (undated) RX ORDER — BUPIVACAINE HYDROCHLORIDE 5 MG/ML
INJECTION, SOLUTION EPIDURAL; INTRACAUDAL
Status: DISPENSED
Start: 2017-04-10

## (undated) RX ORDER — BUPIVACAINE HYDROCHLORIDE 2.5 MG/ML
INJECTION, SOLUTION EPIDURAL; INFILTRATION; INTRACAUDAL
Status: DISPENSED
Start: 2017-06-19

## (undated) RX ORDER — KETOROLAC TROMETHAMINE 30 MG/ML
INJECTION, SOLUTION INTRAMUSCULAR; INTRAVENOUS
Status: DISPENSED
Start: 2018-08-23

## (undated) RX ORDER — LIDOCAINE HYDROCHLORIDE 20 MG/ML
INJECTION, SOLUTION EPIDURAL; INFILTRATION; INTRACAUDAL; PERINEURAL
Status: DISPENSED
Start: 2021-12-29

## (undated) RX ORDER — ACETAMINOPHEN 325 MG/1
TABLET ORAL
Status: DISPENSED
Start: 2021-09-07

## (undated) RX ORDER — METHOCARBAMOL 500 MG/1
TABLET, FILM COATED ORAL
Status: DISPENSED
Start: 2024-10-31

## (undated) RX ORDER — CLINDAMYCIN PHOSPHATE 900 MG/50ML
INJECTION, SOLUTION INTRAVENOUS
Status: DISPENSED
Start: 2024-10-11

## (undated) RX ORDER — DIPHENHYDRAMINE HYDROCHLORIDE 50 MG/ML
INJECTION INTRAMUSCULAR; INTRAVENOUS
Status: DISPENSED
Start: 2022-12-09

## (undated) RX ORDER — FENTANYL CITRATE 50 UG/ML
INJECTION, SOLUTION INTRAMUSCULAR; INTRAVENOUS
Status: DISPENSED
Start: 2024-10-22

## (undated) RX ORDER — IOPAMIDOL 510 MG/ML
INJECTION, SOLUTION INTRAVASCULAR
Status: DISPENSED
Start: 2024-10-31

## (undated) RX ORDER — HEPARIN SODIUM (PORCINE) LOCK FLUSH IV SOLN 100 UNIT/ML 100 UNIT/ML
SOLUTION INTRAVENOUS
Status: DISPENSED
Start: 2017-07-13

## (undated) RX ORDER — ONDANSETRON 2 MG/ML
INJECTION INTRAMUSCULAR; INTRAVENOUS
Status: DISPENSED
Start: 2021-11-28

## (undated) RX ORDER — LEVOFLOXACIN 5 MG/ML
INJECTION, SOLUTION INTRAVENOUS
Status: DISPENSED
Start: 2018-08-23

## (undated) RX ORDER — IOPAMIDOL 408 MG/ML
INJECTION, SOLUTION INTRATHECAL
Status: DISPENSED
Start: 2023-04-11

## (undated) RX ORDER — LIDOCAINE HYDROCHLORIDE 10 MG/ML
INJECTION, SOLUTION EPIDURAL; INFILTRATION; INTRACAUDAL; PERINEURAL
Status: DISPENSED
Start: 2022-06-10

## (undated) RX ORDER — ONDANSETRON 2 MG/ML
INJECTION INTRAMUSCULAR; INTRAVENOUS
Status: DISPENSED
Start: 2021-09-22

## (undated) RX ORDER — FENTANYL CITRATE 50 UG/ML
INJECTION, SOLUTION INTRAMUSCULAR; INTRAVENOUS
Status: DISPENSED
Start: 2024-07-24

## (undated) RX ORDER — SODIUM CHLORIDE 9 MG/ML
INJECTION, SOLUTION INTRAVENOUS
Status: DISPENSED
Start: 2022-06-10

## (undated) RX ORDER — PROPOFOL 10 MG/ML
INJECTION, EMULSION INTRAVENOUS
Status: DISPENSED
Start: 2021-11-28

## (undated) RX ORDER — ONDANSETRON 2 MG/ML
INJECTION INTRAMUSCULAR; INTRAVENOUS
Status: DISPENSED
Start: 2022-01-28

## (undated) RX ORDER — HYDROMORPHONE HYDROCHLORIDE 1 MG/ML
INJECTION, SOLUTION INTRAMUSCULAR; INTRAVENOUS; SUBCUTANEOUS
Status: DISPENSED
Start: 2022-12-30

## (undated) RX ORDER — HEPARIN SODIUM,PORCINE 10 UNIT/ML
VIAL (ML) INTRAVENOUS
Status: DISPENSED
Start: 2022-12-10

## (undated) RX ORDER — DIPHENHYDRAMINE HYDROCHLORIDE 50 MG/ML
INJECTION INTRAMUSCULAR; INTRAVENOUS
Status: DISPENSED
Start: 2023-05-04

## (undated) RX ORDER — PROPOFOL 10 MG/ML
INJECTION, EMULSION INTRAVENOUS
Status: DISPENSED
Start: 2024-09-11

## (undated) RX ORDER — DIPHENHYDRAMINE HYDROCHLORIDE 50 MG/ML
INJECTION INTRAMUSCULAR; INTRAVENOUS
Status: DISPENSED
Start: 2023-12-08

## (undated) RX ORDER — SCOLOPAMINE TRANSDERMAL SYSTEM 1 MG/1
PATCH, EXTENDED RELEASE TRANSDERMAL
Status: DISPENSED
Start: 2022-12-30

## (undated) RX ORDER — PROPOFOL 10 MG/ML
INJECTION, EMULSION INTRAVENOUS
Status: DISPENSED
Start: 2024-10-11

## (undated) RX ORDER — DEXTROSE MONOHYDRATE 25 G/50ML
INJECTION, SOLUTION INTRAVENOUS
Status: DISPENSED
Start: 2023-06-09

## (undated) RX ORDER — FENTANYL CITRATE 50 UG/ML
INJECTION, SOLUTION INTRAMUSCULAR; INTRAVENOUS
Status: DISPENSED
Start: 2025-01-29

## (undated) RX ORDER — FENTANYL CITRATE 50 UG/ML
INJECTION, SOLUTION INTRAMUSCULAR; INTRAVENOUS
Status: DISPENSED
Start: 2023-04-11

## (undated) RX ORDER — FENTANYL CITRATE-0.9 % NACL/PF 10 MCG/ML
PLASTIC BAG, INJECTION (ML) INTRAVENOUS
Status: DISPENSED
Start: 2021-11-28

## (undated) RX ORDER — FENTANYL CITRATE 50 UG/ML
INJECTION, SOLUTION INTRAMUSCULAR; INTRAVENOUS
Status: DISPENSED
Start: 2024-04-16

## (undated) RX ORDER — FENTANYL CITRATE-0.9 % NACL/PF 10 MCG/ML
PLASTIC BAG, INJECTION (ML) INTRAVENOUS
Status: DISPENSED
Start: 2021-12-29

## (undated) RX ORDER — ROCURONIUM BROMIDE 50 MG/5 ML
SYRINGE (ML) INTRAVENOUS
Status: DISPENSED
Start: 2021-12-29

## (undated) RX ORDER — FENTANYL CITRATE 50 UG/ML
INJECTION, SOLUTION INTRAMUSCULAR; INTRAVENOUS
Status: DISPENSED
Start: 2022-12-10

## (undated) RX ORDER — DIPHENHYDRAMINE HYDROCHLORIDE 50 MG/ML
INJECTION INTRAMUSCULAR; INTRAVENOUS
Status: DISPENSED
Start: 2022-06-10

## (undated) RX ORDER — DIPHENHYDRAMINE HYDROCHLORIDE 50 MG/ML
INJECTION INTRAMUSCULAR; INTRAVENOUS
Status: DISPENSED
Start: 2023-01-31

## (undated) RX ORDER — FENTANYL CITRATE 50 UG/ML
INJECTION, SOLUTION INTRAMUSCULAR; INTRAVENOUS
Status: DISPENSED
Start: 2021-12-05

## (undated) RX ORDER — EPHEDRINE SULFATE 50 MG/ML
INJECTION, SOLUTION INTRAMUSCULAR; INTRAVENOUS; SUBCUTANEOUS
Status: DISPENSED
Start: 2018-08-23

## (undated) RX ORDER — FENTANYL CITRATE 50 UG/ML
INJECTION, SOLUTION INTRAMUSCULAR; INTRAVENOUS
Status: DISPENSED
Start: 2023-11-11

## (undated) RX ORDER — DEXTROSE MONOHYDRATE 25 G/50ML
INJECTION, SOLUTION INTRAVENOUS
Status: DISPENSED
Start: 2024-04-15

## (undated) RX ORDER — HEPARIN SODIUM,PORCINE 10 UNIT/ML
VIAL (ML) INTRAVENOUS
Status: DISPENSED
Start: 2022-12-09

## (undated) RX ORDER — HEPARIN SODIUM (PORCINE) LOCK FLUSH IV SOLN 100 UNIT/ML 100 UNIT/ML
SOLUTION INTRAVENOUS
Status: DISPENSED
Start: 2022-06-10

## (undated) RX ORDER — FENTANYL CITRATE 50 UG/ML
INJECTION, SOLUTION INTRAMUSCULAR; INTRAVENOUS
Status: DISPENSED
Start: 2022-02-03

## (undated) RX ORDER — SIMETHICONE 40MG/0.6ML
SUSPENSION, DROPS(FINAL DOSAGE FORM)(ML) ORAL
Status: DISPENSED
Start: 2021-11-28

## (undated) RX ORDER — SIMETHICONE 40MG/0.6ML
SUSPENSION, DROPS(FINAL DOSAGE FORM)(ML) ORAL
Status: DISPENSED
Start: 2021-09-07

## (undated) RX ORDER — DEXAMETHASONE SODIUM PHOSPHATE 4 MG/ML
INJECTION, SOLUTION INTRA-ARTICULAR; INTRALESIONAL; INTRAMUSCULAR; INTRAVENOUS; SOFT TISSUE
Status: DISPENSED
Start: 2021-09-07

## (undated) RX ORDER — LIDOCAINE HYDROCHLORIDE 10 MG/ML
INJECTION, SOLUTION INFILTRATION; PERINEURAL
Status: DISPENSED
Start: 2023-01-12

## (undated) RX ORDER — DIPHENHYDRAMINE HCL 25 MG
CAPSULE ORAL
Status: DISPENSED
Start: 2022-12-09

## (undated) RX ORDER — HEPARIN SODIUM 5000 [USP'U]/.5ML
INJECTION, SOLUTION INTRAVENOUS; SUBCUTANEOUS
Status: DISPENSED
Start: 2024-10-11

## (undated) RX ORDER — ONDANSETRON 2 MG/ML
INJECTION INTRAMUSCULAR; INTRAVENOUS
Status: DISPENSED
Start: 2022-02-03

## (undated) RX ORDER — FENTANYL CITRATE 50 UG/ML
INJECTION, SOLUTION INTRAMUSCULAR; INTRAVENOUS
Status: DISPENSED
Start: 2022-06-10

## (undated) RX ORDER — HYDROMORPHONE HYDROCHLORIDE 1 MG/ML
INJECTION, SOLUTION INTRAMUSCULAR; INTRAVENOUS; SUBCUTANEOUS
Status: DISPENSED
Start: 2021-11-28

## (undated) RX ORDER — FENTANYL CITRATE 50 UG/ML
INJECTION, SOLUTION INTRAMUSCULAR; INTRAVENOUS
Status: DISPENSED
Start: 2022-01-28

## (undated) RX ORDER — DIPHENHYDRAMINE HYDROCHLORIDE 50 MG/ML
INJECTION INTRAMUSCULAR; INTRAVENOUS
Status: DISPENSED
Start: 2023-08-01

## (undated) RX ORDER — IOPAMIDOL 408 MG/ML
INJECTION, SOLUTION INTRATHECAL
Status: DISPENSED
Start: 2023-12-08